# Patient Record
Sex: FEMALE | Race: BLACK OR AFRICAN AMERICAN | NOT HISPANIC OR LATINO | Employment: OTHER | URBAN - METROPOLITAN AREA
[De-identification: names, ages, dates, MRNs, and addresses within clinical notes are randomized per-mention and may not be internally consistent; named-entity substitution may affect disease eponyms.]

---

## 2013-07-23 LAB — CRC RECOMMENDATION EXT: NORMAL

## 2017-02-03 PROBLEM — R06.02 SHORTNESS OF BREATH: Status: ACTIVE | Noted: 2017-02-03

## 2017-02-04 PROBLEM — R05.9 COUGH: Status: ACTIVE | Noted: 2017-02-04

## 2017-02-21 ENCOUNTER — TELEPHONE (OUTPATIENT)
Dept: ELECTROPHYSIOLOGY | Facility: CLINIC | Age: 52
End: 2017-02-21

## 2017-02-21 NOTE — TELEPHONE ENCOUNTER
Patient called c/o chest pain, coughing and sob.Nurse recommended that patient seek treatment at nearest ER. Patient acknowledged.

## 2017-03-06 ENCOUNTER — INITIAL CONSULT (OUTPATIENT)
Dept: SURGERY | Facility: CLINIC | Age: 52
End: 2017-03-06
Payer: MEDICAID

## 2017-03-06 VITALS
HEIGHT: 66 IN | WEIGHT: 223.31 LBS | RESPIRATION RATE: 19 BRPM | BODY MASS INDEX: 35.89 KG/M2 | HEART RATE: 87 BPM | DIASTOLIC BLOOD PRESSURE: 90 MMHG | SYSTOLIC BLOOD PRESSURE: 130 MMHG | OXYGEN SATURATION: 98 %

## 2017-03-06 DIAGNOSIS — K64.5 INTERNAL THROMBOSED HEMORRHOIDS: ICD-10-CM

## 2017-03-06 DIAGNOSIS — K64.5 THROMBOSED HEMORRHOIDS: ICD-10-CM

## 2017-03-06 DIAGNOSIS — R06.02 SHORTNESS OF BREATH: Primary | ICD-10-CM

## 2017-03-06 PROCEDURE — 99214 OFFICE O/P EST MOD 30 MIN: CPT | Mod: S$GLB,,, | Performed by: EMERGENCY MEDICINE

## 2017-03-06 RX ORDER — HYDROCODONE BITARTRATE AND ACETAMINOPHEN 10; 325 MG/1; MG/1
1 TABLET ORAL
Refills: 0 | Status: ON HOLD | COMMUNITY
Start: 2017-02-06 | End: 2017-03-07

## 2017-03-06 RX ORDER — CETIRIZINE HYDROCHLORIDE 10 MG/1
1 TABLET ORAL DAILY
Refills: 6 | Status: ON HOLD | COMMUNITY
Start: 2017-01-15 | End: 2018-02-06 | Stop reason: HOSPADM

## 2017-03-06 RX ORDER — SYRING-NEEDL,DISP,INSUL,0.3 ML 29 G X1/2"
296 SYRINGE, EMPTY DISPOSABLE MISCELLANEOUS ONCE
Qty: 296 ML | Refills: 0 | Status: SHIPPED | OUTPATIENT
Start: 2017-03-06 | End: 2017-03-06

## 2017-03-06 RX ORDER — HYDROCODONE BITARTRATE AND ACETAMINOPHEN 10; 325 MG/1; MG/1
1 TABLET ORAL 4 TIMES DAILY PRN
Qty: 32 TABLET | Refills: 0 | Status: SHIPPED | OUTPATIENT
Start: 2017-03-06 | End: 2017-03-14

## 2017-03-06 RX ORDER — GABAPENTIN 100 MG/1
1 CAPSULE ORAL NIGHTLY
Refills: 2 | Status: ON HOLD | COMMUNITY
Start: 2017-01-03 | End: 2018-05-08

## 2017-03-06 RX ORDER — PROMETHAZINE HYDROCHLORIDE 6.25 MG/5ML
5 SYRUP ORAL
Refills: 0 | COMMUNITY
Start: 2017-03-01 | End: 2017-12-13 | Stop reason: DRUGHIGH

## 2017-03-06 RX ORDER — POTASSIUM CHLORIDE 750 MG/1
1 TABLET, EXTENDED RELEASE ORAL DAILY
Refills: 10 | Status: ON HOLD | COMMUNITY
Start: 2016-12-23 | End: 2018-08-21 | Stop reason: SDUPTHER

## 2017-03-06 RX ORDER — BUDESONIDE 90 UG/1
1 AEROSOL, POWDER RESPIRATORY (INHALATION) 2 TIMES DAILY
Refills: 6 | Status: ON HOLD | COMMUNITY
Start: 2017-02-17 | End: 2017-09-20

## 2017-03-06 RX ORDER — ALBUTEROL SULFATE 90 UG/1
2 AEROSOL, METERED RESPIRATORY (INHALATION) EVERY 6 HOURS PRN
Refills: 11 | COMMUNITY
Start: 2017-03-01 | End: 2023-05-23 | Stop reason: SDUPTHER

## 2017-03-06 NOTE — LETTER
March 9, 2017      Augustin Lion MD  [address]  [place] [address]           [facility]  [address]  [address]  [facility] [address]  Phone: [phone]  Fax: [phone]          Patient: Hafsa Hawley   MR Number: [id number]   YOB: 1965   Date of Visit: 3/6/2017       Dear Dr. Augustin Lion:    Thank you for referring Hafsa Hawley to me for evaluation. Attached you will find relevant portions of my assessment and plan of care.    If you have questions, please do not hesitate to call me. I look forward to following Hafsa Hawley along with you.    Sincerely,    GEORGE Rothman MD    Enclosure  CC:  No Recipients    If you would like to receive this communication electronically, please contact [email or web address] or [phone] to request more information on [employer] access.    For providers and/or their staff who would like to refer a patient to [facility], please contact us through our one-stop-shop provider referral line, [facility], at [phone].    If you feel you have received this communication in error or would no longer like to receive these types of communications, please e-mail [email or web address]

## 2017-03-06 NOTE — MR AVS SNAPSHOT
[facility]  [address]  [address] [address]  Phone: [phone]  Fax: [phone]                  Hafsa Hawley   3/6/2017 1:00 PM   Initial consult    Description:  Female : 1965   Provider:  GEORGE Rothman MD   Department:  [facility]           Reason for Visit     Hemorrhoids           Diagnoses this Visit        Comments    Shortness of breath    -  Primary     Thrombosed hemorrhoids         Internal thrombosed hemorrhoids                To Do List           Future Appointments        Provider Department Dept Phone    3/16/2017 1:40 PM PACEMAKER, [facility] y - Arrhythmia [phone]    3/20/2017 3:00 PM GEORGE Rothman MD [facility] [phone]      Goals (5 Years of Data)     None       These Medications        Disp Refills Start End    hydrocodone-acetaminophen 10-325mg (NORCO)  mg Tab 32 tablet 0 3/6/2017 3/14/2017    Take 1 tablet by mouth 4 (four) times daily as needed for Pain. - Oral    Pharmacy: [facility] [address] [place] [address] [id number] AT [facility] [address] Ph #: [phone]       magnesium citrate solution 296 mL 0 3/6/2017 3/6/2017    Take 296 mLs by mouth once. Drink 1/2 bottle evening prior to surgery - Oral    Pharmacy: [facility] [address] [place] [address] [id number] AT [facility] [address] Ph #: [phone]         [facility]     [facility] On Call Nurse Care Line -  Assistance  Registered nurses in the [facility] Center provide clinical advisement, health education, appointment booking, and other advisory services.  Call for this free service at [phone].             Medications           Message regarding Medications     Verify the changes and/or additions to your medication regime listed below are the same as discussed with your clinician today.  If any of these changes or additions are incorrect, please  notify your healthcare provider.        START taking these NEW medications        Refills    hydrocodone-acetaminophen 10-325mg (NORCO)  mg Tab 0    Sig: Take 1 tablet by mouth 4 (four) times daily as needed for Pain.    Class: Print    Route: Oral    magnesium citrate solution 0    Sig: Take 296 mLs by mouth once. Drink 1/2 bottle evening prior to surgery    Class: Print    Route: Oral      STOP taking these medications     hydrocodone-acetaminophen 5-325mg (NORCO) 5-325 mg per tablet Take 1 tablet by mouth every 4 to 6 hours as needed.    potassium chloride SA (K-DUR,KLOR-CON) 20 MEQ tablet Take 1 tablet (20 mEq total) by mouth once daily.    SYMBICORT 80-4.5 mcg/actuation HFAA Inhale 1 puff into the lungs 2 (two) times daily.           Verify that the below list of medications is an accurate representation of the medications you are currently taking.  If none reported, the list may be blank. If incorrect, please contact your healthcare provider. Carry this list with you in case of emergency.           Current Medications     albuterol (PROVENTIL) 2.5 mg /3 mL (0.083 %) nebulizer solution Take 2 mLs by nebulization 2 (two) times daily as needed.    alprazolam (XANAX) 2 MG Tab Take 1 tablet by mouth as needed.    apixaban (ELIQUIS) 5 mg Tab Take 1 tablet (5 mg total) by mouth 2 (two) times daily.    b complex vitamins tablet Take 1 tablet by mouth once daily.    carvedilol (COREG) 25 MG tablet Take 1 tablet (25 mg total) by mouth 2 (two) times daily.    cetirizine (ZYRTEC) 10 MG tablet Take 1 tablet by mouth once daily.    cyanocobalamin, vitamin B-12, 50 mcg tablet Take 5 mcg by mouth once daily.    digoxin (LANOXIN) 125 mcg tablet Take 1 tablet (0.125 mg total) by mouth once daily.    furosemide (LASIX) 80 MG tablet Take 1 tablet (80 mg total) by mouth once daily.    gabapentin (NEURONTIN) 100 MG capsule Take 1 capsule by mouth every evening.    guaifenesin 100 mg/5 ml (ROBITUSSIN) 100 mg/5 mL syrup Take  "200 mg by mouth 3 (three) times daily as needed for Cough.    hydrALAZINE (APRESOLINE) 25 MG tablet Take 1 tablet (25 mg total) by mouth 2 (two) times daily.    hydrocodone-acetaminophen 10-325mg (NORCO)  mg Tab Take 1 tablet by mouth 4 (four) times daily as needed for Pain.    ipratropium-albuterol (COMBIVENT RESPIMAT)  mcg/actuation inhaler Inhale 1 puff into the lungs 2 (two) times daily. Rescue    isosorbide dinitrate (ISORDIL) 20 MG tablet Take 1 tablet (20 mg total) by mouth 2 (two) times daily.    lidocaine HCL 2% (XYLOCAINE) 2 % jelly Apply topically as needed.    NITROSTAT 0.4 mg SL tablet Take 1 mg by mouth as needed.    pantoprazole (PROTONIX) 40 MG tablet Take 1 tablet by mouth once daily.    potassium chloride (KLOR-CON) 10 MEQ TbSR Take 1 tablet by mouth once daily.    promethazine (PHENERGAN) 6.25 mg/5 mL syrup Take 5 mLs by mouth as needed.    PULMICORT FLEXHALER 90 mcg/actuation AePB Inhale 1 puff into the lungs 2 (two) times daily.    SPIRIVA WITH HANDIHALER 18 mcg inhalation capsule USE ONE C PO QD    spironolactone (ALDACTONE) 25 MG tablet Take 25 mg by mouth once daily.    VENTOLIN HFA 90 mcg/actuation inhaler Inhale 2 puffs into the lungs 2 (two) times daily.           Clinical Reference Information           Your Vitals Were     BP Pulse Resp Height Weight Last Period    130/90 (BP Location: Left arm, Patient Position: Standing, BP Method: Manual) 87 19 5' 6" (1.676 m) 101.3 kg (223 lb 4.8 oz) 10/23/2001    SpO2 BMI             98% 36.04 kg/m2         Blood Pressure          Most Recent Value    BP  (!)  130/90      Allergies as of 3/6/2017     Penicillins    Percocet [Oxycodone-acetaminophen]    Iodinated Contrast Media - Iv Dye    Diovan Hct [Valsartan-hydrochlorothiazide]    Tramadol      Immunizations Administered on Date of Encounter - 3/6/2017     None      Orders Placed During Today's Visit      Normal Orders This Visit    Case Request Operating Room: PROCEDURE PROLAPSE " HEMORRHOID (PPH)     EKG 12-lead     Future Labs/Procedures Expected by Expires    B-TYPE NATRIURETIC PEPTIDE  3/6/2017 5/5/2018    CK-MB  3/6/2017 5/5/2018    CK  3/6/2017 5/5/2018    TROPONIN I  3/6/2017 5/5/2018      [facility] Sign-Up     Activating your [facility] account is as easy as 1-2-3!     1) Visit [email or web address], select Sign Up Now, enter this activation code and your date of birth, then select Next.  [email or web address]  Expires: 3/21/2017  2:52 PM      2) Create a username and password to use when you visit [facility] in the future and select a security question in case you lose your password and select Next.    3) Enter your e-mail address and click Sign Up!    Additional Information  If you have questions, please e-mail [email or web address] or call [phone] to talk to our [facility] staff. Remember, [facility] is NOT to be used for urgent needs. For medical emergencies, dial 911.         Instructions    1. NOTHING TO EAT OR DRINK AFTER 1 AM THE DAY OF YOUR PROCEDURE    2. YOU MAY TAKE YOUR USUAL HEART AND BLOOD PRESSURE MEDICATIONS WITH A FEW SMALL SIPS OF     WATER THE MORNING OF YOUR PROCEDURE    3. IF YOU ARE DIABETIC, DO NOT TAKE ANY DIABETIC MEDICATIONS THE MORNING OF YOUR PROCEDURE    4. SHOWER OR BATHE THE MORNING OF YOUR PROCEDURE, IF AT ALL POSSIBLE    5. YOU WILL NEED SOMEONE TO ACCOMPANY YOU TO DRIVE YOU HOME AFTER YOUR PROCEDURE    6. CALL OUR OFFICE IF YOU HAVE ANY QUESTIONS............[phone]  TAKE 1/2 BOTTLE OF MAGNESIUM CITRATE THIS EVENING       Language Assistance Services     ATTENTION: Language assistance services are available, free of charge. Please call [phone].      ATENCIÓN: Si habla español, tiene a freeman disposición servicios gratuitos de asistencia lingüística. Llame al [phone].     CHÚ Ý: N?u b?n nói Ti?ng Vi?t, có các d?ch v? h? tr? ngôn ng? mi?n phí dành cho b?n. G?i s? [phone].         [facility] complies with applicable Federal  civil rights laws and does not discriminate on the basis of race, color, national origin, age, disability, or sex.

## 2017-03-06 NOTE — PROGRESS NOTES
History & Physical    SUBJECTIVE:     History of Present Illness:  Patient is a 51 y.o. female presents with thrombosed hemorrhoids and dyspnea. Referred by her PCP, Dr Faye    Chief Complaint   Patient presents with    Hemorrhoids       Review of patient's allergies indicates:   Allergen Reactions    Penicillins Hives    Percocet [oxycodone-acetaminophen] Hives    Iodinated contrast media - iv dye Nausea And Vomiting    Diovan hct [valsartan-hydrochlorothiazide] Other (See Comments)     Causes coughing    Tramadol Nausea And Vomiting       Current Outpatient Prescriptions   Medication Sig Dispense Refill    albuterol (PROVENTIL) 2.5 mg /3 mL (0.083 %) nebulizer solution Take 2 mLs by nebulization 2 (two) times daily as needed.  3    alprazolam (XANAX) 2 MG Tab Take 1 tablet by mouth as needed.  1    apixaban (ELIQUIS) 5 mg Tab Take 1 tablet (5 mg total) by mouth 2 (two) times daily. 60 tablet 11    b complex vitamins tablet Take 1 tablet by mouth once daily.      carvedilol (COREG) 25 MG tablet Take 1 tablet (25 mg total) by mouth 2 (two) times daily. 60 tablet 11    cetirizine (ZYRTEC) 10 MG tablet Take 1 tablet by mouth once daily.  6    cyanocobalamin, vitamin B-12, 50 mcg tablet Take 5 mcg by mouth once daily.      furosemide (LASIX) 80 MG tablet Take 1 tablet (80 mg total) by mouth once daily. 30 tablet 11    gabapentin (NEURONTIN) 100 MG capsule Take 1 capsule by mouth every evening.  2    hydrALAZINE (APRESOLINE) 25 MG tablet Take 1 tablet (25 mg total) by mouth 2 (two) times daily. 60 tablet 11    hydrocodone-acetaminophen 10-325mg (NORCO)  mg Tab Take 1 tablet by mouth as needed.  0    ipratropium-albuterol (COMBIVENT RESPIMAT)  mcg/actuation inhaler Inhale 1 puff into the lungs 2 (two) times daily. Rescue      isosorbide dinitrate (ISORDIL) 20 MG tablet Take 1 tablet (20 mg total) by mouth 2 (two) times daily. 60 tablet 11    NITROSTAT 0.4 mg SL tablet Take 1 mg by mouth as  needed.  4    pantoprazole (PROTONIX) 40 MG tablet Take 1 tablet by mouth once daily.  5    potassium chloride (KLOR-CON) 10 MEQ TbSR Take 1 tablet by mouth once daily.  10    promethazine (PHENERGAN) 6.25 mg/5 mL syrup Take 5 mLs by mouth as needed.  0    PULMICORT FLEXHALER 90 mcg/actuation AePB Inhale 1 puff into the lungs 2 (two) times daily.  6    SPIRIVA WITH HANDIHALER 18 mcg inhalation capsule USE ONE C PO QD  6    VENTOLIN HFA 90 mcg/actuation inhaler Inhale 2 puffs into the lungs 2 (two) times daily.  11    digoxin (LANOXIN) 125 mcg tablet Take 1 tablet (0.125 mg total) by mouth once daily. 30 tablet 11    hydrocodone-acetaminophen 10-325mg (NORCO)  mg Tab Take 1 tablet by mouth 4 (four) times daily as needed for Pain. 32 tablet 0    magnesium citrate solution Take 296 mLs by mouth once. Drink 1/2 bottle evening prior to surgery 296 mL 0     No current facility-administered medications for this visit.        Past Medical History:   Diagnosis Date    Anemia     Anticoagulant long-term use     Arthritis     Atrial fibrillation     Congestive heart failure     COPD (chronic obstructive pulmonary disease)     Encounter for blood transfusion     GERD (gastroesophageal reflux disease)     Hypertension     Thyroid disease      Past Surgical History:   Procedure Laterality Date    APPENDECTOMY      CARDIAC DEFIBRILLATOR PLACEMENT Left 12/2016    CARDIAC DEFIBRILLATOR PLACEMENT  2016    EYE SURGERY      FRACTURE SURGERY      HYSTERECTOMY      left knee mass removal Left      History reviewed. No pertinent family history.  Social History   Substance Use Topics    Smoking status: Never Smoker    Smokeless tobacco: Never Used    Alcohol use Yes      Comment: occaisionally        Review of Systems:  Review of Systems   Constitutional: Negative for appetite change, chills, diaphoresis, fatigue, fever and unexpected weight change.   HENT: Negative for congestion, dental problem, ear  "pain, facial swelling, mouth sores, nosebleeds, sinus pressure, sneezing, sore throat, trouble swallowing and voice change.    Eyes: Negative for photophobia, redness and visual disturbance.   Respiratory: Positive for chest tightness and shortness of breath. Negative for cough, choking, wheezing and stridor.    Cardiovascular: Negative for chest pain, palpitations and leg swelling.   Gastrointestinal: Negative for abdominal distention, abdominal pain, anal bleeding, blood in stool, constipation, diarrhea, nausea, rectal pain and vomiting.   Endocrine: Negative for cold intolerance and heat intolerance.   Genitourinary: Negative for difficulty urinating, dysuria, flank pain, hematuria, menstrual problem, pelvic pain, vaginal bleeding, vaginal discharge and vaginal pain.        Very painful thrombosed hemorrhoids   Musculoskeletal: Negative for arthralgias, back pain, joint swelling, myalgias, neck pain and neck stiffness.   Skin: Negative for rash and wound.   Allergic/Immunologic: Negative for environmental allergies, food allergies and immunocompromised state.   Neurological: Negative for dizziness, seizures, syncope, facial asymmetry, speech difficulty, weakness, light-headedness, numbness and headaches.   Hematological: Negative for adenopathy. Does not bruise/bleed easily.   Psychiatric/Behavioral: Negative for confusion.       OBJECTIVE:     Vital Signs (Most Recent)  Pulse: 87 (03/06/17 1622)  Resp: 19 (03/06/17 1622)  BP: (!) 130/90 (03/06/17 1622)  SpO2: 98 % (03/06/17 1622)  5' 6" (1.676 m)  101.3 kg (223 lb 4.8 oz)     Physical Exam:  Physical Exam   Constitutional: She appears well-developed and well-nourished. No distress.   HENT:   Head: Normocephalic and atraumatic.   Right Ear: External ear normal.   Left Ear: External ear normal.   Nose: Nose normal.   Mouth/Throat: Oropharynx is clear and moist. No oropharyngeal exudate.   Eyes: Conjunctivae and EOM are normal. Pupils are equal, round, and reactive " to light. No scleral icterus.   Neck: Normal range of motion. Neck supple. No JVD present. No tracheal deviation present. No thyromegaly present.   Cardiovascular: Normal rate, regular rhythm and intact distal pulses.  Exam reveals no gallop and no friction rub.    No murmur heard.  Pulmonary/Chest: Effort normal and breath sounds normal. No stridor. No respiratory distress. She has no wheezes. She has no rales. She exhibits no tenderness.   Abdominal: Soft. Bowel sounds are normal. She exhibits no distension and no mass. There is no tenderness. There is no rebound and no guarding. No hernia.   Genitourinary:   Genitourinary Comments: Garde III hemorrhoids ( internal and external; Thrombosed hemorrhoids   Musculoskeletal: She exhibits no edema or tenderness.   Lymphadenopathy:     She has no cervical adenopathy.   Neurological: She is alert. She has normal reflexes.   Skin: Skin is warm and dry. No rash noted. She is not diaphoretic. No erythema. No pallor.   Psychiatric: She has a normal mood and affect.       Laboratory  reviewed    Diagnostic Results:  reviewed    ASSESSMENT/PLAN:     Thrombosed Internal & external hemorrhoids  Dyspnea    PLAN:Plan     Surgical excision in AM  Work up dyspnea pr-op tonight

## 2017-03-06 NOTE — PATIENT INSTRUCTIONS
1. NOTHING TO EAT OR DRINK AFTER 1 AM THE DAY OF YOUR PROCEDURE    2. YOU MAY TAKE YOUR USUAL HEART AND BLOOD PRESSURE MEDICATIONS WITH A FEW SMALL SIPS OF     WATER THE MORNING OF YOUR PROCEDURE    3. IF YOU ARE DIABETIC, DO NOT TAKE ANY DIABETIC MEDICATIONS THE MORNING OF YOUR PROCEDURE    4. SHOWER OR BATHE THE MORNING OF YOUR PROCEDURE, IF AT ALL POSSIBLE    5. YOU WILL NEED SOMEONE TO ACCOMPANY YOU TO DRIVE YOU HOME AFTER YOUR PROCEDURE    6. CALL OUR OFFICE IF YOU HAVE ANY QUESTIONS............[phone]  TAKE 1/2 BOTTLE OF MAGNESIUM CITRATE THIS EVENING

## 2017-03-07 PROBLEM — K64.5 INTERNAL THROMBOSED HEMORRHOIDS: Status: ACTIVE | Noted: 2017-03-07

## 2017-03-16 ENCOUNTER — CLINICAL SUPPORT (OUTPATIENT)
Dept: ELECTROPHYSIOLOGY | Facility: CLINIC | Age: 52
End: 2017-03-16
Payer: MEDICAID

## 2017-03-16 DIAGNOSIS — Z95.810 AUTOMATIC IMPLANTABLE CARDIAC DEFIBRILLATOR IN SITU: ICD-10-CM

## 2017-03-16 PROCEDURE — 93282 PRGRMG EVAL IMPLANTABLE DFB: CPT | Mod: PBBFAC | Performed by: INTERNAL MEDICINE

## 2017-03-17 RX ORDER — SPIRONOLACTONE 50 MG/1
1 TABLET, FILM COATED ORAL DAILY
Refills: 6 | COMMUNITY
Start: 2017-03-01 | End: 2017-03-17

## 2017-03-17 RX ORDER — IPRATROPIUM BROMIDE AND ALBUTEROL SULFATE 2.5; .5 MG/3ML; MG/3ML
1 SOLUTION RESPIRATORY (INHALATION) 2 TIMES DAILY
Refills: 11 | Status: ON HOLD | COMMUNITY
Start: 2017-03-01 | End: 2018-05-10

## 2017-03-20 ENCOUNTER — OFFICE VISIT (OUTPATIENT)
Dept: SURGERY | Facility: CLINIC | Age: 52
End: 2017-03-20
Payer: MEDICAID

## 2017-03-20 VITALS
WEIGHT: 218.38 LBS | RESPIRATION RATE: 18 BRPM | OXYGEN SATURATION: 96 % | DIASTOLIC BLOOD PRESSURE: 90 MMHG | BODY MASS INDEX: 35.1 KG/M2 | HEIGHT: 66 IN | HEART RATE: 96 BPM | SYSTOLIC BLOOD PRESSURE: 140 MMHG

## 2017-03-20 DIAGNOSIS — Z98.890 POST-OPERATIVE STATE: Primary | ICD-10-CM

## 2017-03-20 PROCEDURE — 99024 POSTOP FOLLOW-UP VISIT: CPT | Mod: S$GLB,,, | Performed by: EMERGENCY MEDICINE

## 2017-03-20 NOTE — MR AVS SNAPSHOT
[facility]  [address]  [address] [address]  Phone: [phone]  Fax: [phone]                  Hafsa Hawley   3/20/2017 3:00 PM   Office Visit    Description:  Female : 1965   Provider:  GEORGE Rothman MD   Department:  [facility]           Reason for Visit     Post-op Evaluation           Diagnoses this Visit        Comments    Post-operative state    -  Primary            To Do List           Goals (5 Years of Data)     None      [facility]     [facility] Nurse Care Line -  Assistance  Registered nurses in the [facility] provide clinical advisement, health education, appointment booking, and other advisory services.  Call for this free service at [phone].             Medications           Message regarding Medications     Verify the changes and/or additions to your medication regime listed below are the same as discussed with your clinician today.  If any of these changes or additions are incorrect, please notify your healthcare provider.             Verify that the below list of medications is an accurate representation of the medications you are currently taking.  If none reported, the list may be blank. If incorrect, please contact your healthcare provider. Carry this list with you in case of emergency.           Current Medications     albuterol-ipratropium 2.5mg-0.5mg/3mL (DUO-NEB) 0.5 mg-3 mg(2.5 mg base)/3 mL nebulizer solution Take 1 mL by nebulization 2 (two) times daily.    alprazolam (XANAX) 2 MG Tab Take 1 tablet by mouth as needed.    apixaban (ELIQUIS) 5 mg Tab Take 1 tablet (5 mg total) by mouth 2 (two) times daily.    b complex vitamins tablet Take 1 tablet by mouth once daily.    cetirizine (ZYRTEC) 10 MG tablet Take 1 tablet by mouth once daily.    cyanocobalamin, vitamin B-12, 50 mcg tablet Take 5 mcg by mouth once daily.    digoxin (LANOXIN) 125 mcg tablet Take 1 tablet (0.125  "mg total) by mouth once daily.    furosemide (LASIX) 80 MG tablet Take 1 tablet (80 mg total) by mouth once daily.    gabapentin (NEURONTIN) 100 MG capsule Take 1 capsule by mouth every evening.    hydrALAZINE (APRESOLINE) 25 MG tablet Take 1 tablet (25 mg total) by mouth 2 (two) times daily.    ipratropium-albuterol (COMBIVENT RESPIMAT)  mcg/actuation inhaler Inhale 1 puff into the lungs 2 (two) times daily. Rescue    isosorbide dinitrate (ISORDIL) 20 MG tablet Take 1 tablet (20 mg total) by mouth 2 (two) times daily.    lidocaine HCL 2% (XYLOCAINE) 2 % jelly Apply topically as needed.    NITROSTAT 0.4 mg SL tablet Take 1 mg by mouth as needed.    pantoprazole (PROTONIX) 40 MG tablet Take 1 tablet by mouth once daily.    potassium chloride (KLOR-CON) 10 MEQ TbSR Take 1 tablet by mouth once daily.    promethazine (PHENERGAN) 6.25 mg/5 mL syrup Take 5 mLs by mouth as needed.    PULMICORT FLEXHALER 90 mcg/actuation AePB Inhale 1 puff into the lungs 2 (two) times daily.    SPIRIVA WITH HANDIHALER 18 mcg inhalation capsule USE ONE C PO QD    VENTOLIN HFA 90 mcg/actuation inhaler Inhale 2 puffs into the lungs 2 (two) times daily.    carvedilol (COREG) 25 MG tablet Take 1 tablet (25 mg total) by mouth 2 (two) times daily.    guaifenesin 100 mg/5 ml (ROBITUSSIN) 100 mg/5 mL syrup Take 200 mg by mouth 3 (three) times daily as needed for Cough.           Clinical Reference Information           Your Vitals Were     BP Pulse Resp Height Weight Last Period    140/90 (BP Location: Left arm, Patient Position: Sitting, BP Method: Manual) 96 18 5' 6" (1.676 m) 99.1 kg (218 lb 6.4 oz) 10/23/2001    SpO2 BMI             96% 35.25 kg/m2         Blood Pressure          Most Recent Value    BP  (!)  140/90      Allergies as of 3/20/2017     Penicillins    Iodinated Contrast Media - Iv Dye    Percocet [Oxycodone-acetaminophen]    Diovan Hct [Valsartan-hydrochlorothiazide]    Tramadol      Immunizations Administered on Date of " Encounter - 3/20/2017     None      [facility] Sign-Up     Activating your [facility] account is as easy as 1-2-3!     1) Visit [email or web address], select Sign Up Now, enter this activation code and your date of birth, then select Next.  [email or web address]  Expires: 3/21/2017  3:52 PM      2) Create a username and password to use when you visit [facility] in the future and select a security question in case you lose your password and select Next.    3) Enter your e-mail address and click Sign Up!    Additional Information  If you have questions, please e-mail [email or web address] or call [phone] to talk to our [facility] staff. Remember, [facility] is NOT to be used for urgent needs. For medical emergencies, dial 911.         Language Assistance Services     ATTENTION: Language assistance services are available, free of charge. Please call [phone].      ATENCIÓN: Si habla español, tiene a freeman disposición servicios gratuitos de asistencia lingüística. Llame al [phone].     SHELBI Ý: N?u b?n nói Ti?ng Vi?t, có các d?ch v? h? tr? ngôn ng? mi?n phí dành cho b?n. G?i s? [phone].         [facility] complies with applicable Federal civil rights laws and does not discriminate on the basis of race, color, national origin, age, disability, or sex.

## 2017-03-20 NOTE — PROGRESS NOTES
Healing slowly but well from hemorrhoid surgery    Doesn't feel well today. States she has theses type days from time to time  Encouraged to follow up with her cardiologist sooner rather than later.    RTC as needed  Continue post op care program

## 2017-04-19 PROBLEM — E27.8 ADRENAL MASS, RIGHT: Status: ACTIVE | Noted: 2017-04-19

## 2017-06-07 PROBLEM — M51.369 LUMBAR DEGENERATIVE DISC DISEASE: Status: ACTIVE | Noted: 2017-06-07

## 2017-06-07 PROBLEM — M51.36 LUMBAR DEGENERATIVE DISC DISEASE: Status: ACTIVE | Noted: 2017-06-07

## 2017-06-09 ENCOUNTER — TELEPHONE (OUTPATIENT)
Dept: SURGERY | Facility: CLINIC | Age: 52
End: 2017-06-09

## 2017-06-09 DIAGNOSIS — E27.8 ADRENAL MASS: Primary | ICD-10-CM

## 2017-06-09 NOTE — TELEPHONE ENCOUNTER
----- Message from Burke Ngueyn Jr., MD sent at 6/9/2017  7:20 AM CDT -----  These really dont look that abnormal.  Im not too worried about it but may be worth it to get a formal assessment.  We will get her seen by Endocrine here.  Prob the best thing for her.  Sound good?

## 2017-06-09 NOTE — TELEPHONE ENCOUNTER
adrenal mass   Received: Today   Message Contents   Emelia Galicia RN  P [facility] Staff; P [facility]   Cc: Sophia Gonsalez RN; Burke Nguyen Jr., MD; MD Meghan Toney,     Can Dr. Deng's staff or [facility] staff scheduled this pt to be seen for adrenal mass please. She lives closer to where Dr. Mendoza's office is located.       Please let me know if you have any questions or need anything.     Thanks!   Emelia Francisco [id number]

## 2017-06-20 ENCOUNTER — TELEPHONE (OUTPATIENT)
Dept: ELECTROPHYSIOLOGY | Facility: CLINIC | Age: 52
End: 2017-06-20

## 2017-06-20 ENCOUNTER — CLINICAL SUPPORT (OUTPATIENT)
Dept: ELECTROPHYSIOLOGY | Facility: CLINIC | Age: 52
End: 2017-06-20
Payer: MEDICAID

## 2017-06-20 DIAGNOSIS — I42.8 CARDIOMYOPATHY, NONISCHEMIC: ICD-10-CM

## 2017-06-20 PROCEDURE — 93295 DEV INTERROG REMOTE 1/2/MLT: CPT | Mod: ,,, | Performed by: INTERNAL MEDICINE

## 2017-06-20 PROCEDURE — 93296 REM INTERROG EVL PM/IDS: CPT | Mod: PBBFAC | Performed by: INTERNAL MEDICINE

## 2017-06-20 NOTE — TELEPHONE ENCOUNTER
----- Message from Liset Weaver sent at 6/20/2017 11:44 AM CDT -----  Contact: pt called  Pt called, states she will like to confirm that you have received transmission.Ph [phone]. Thank you    I spoke with patient and informed her that I did receive her transmission. Patient stated understanding.

## 2017-09-07 RX ORDER — DIGOXIN 125 UG/1
TABLET ORAL
Qty: 30 TABLET | Refills: 0 | OUTPATIENT
Start: 2017-09-07

## 2017-09-19 PROBLEM — I21.4 NSTEMI (NON-ST ELEVATED MYOCARDIAL INFARCTION): Status: ACTIVE | Noted: 2017-09-19

## 2017-09-20 PROBLEM — R06.02 SOB (SHORTNESS OF BREATH): Status: ACTIVE | Noted: 2017-09-20

## 2017-09-22 ENCOUNTER — TELEPHONE (OUTPATIENT)
Dept: ELECTROPHYSIOLOGY | Facility: CLINIC | Age: 52
End: 2017-09-22

## 2017-09-22 DIAGNOSIS — I42.8 NONISCHEMIC CARDIOMYOPATHY: ICD-10-CM

## 2017-09-22 DIAGNOSIS — Z95.810 ICD (IMPLANTABLE CARDIOVERTER-DEFIBRILLATOR) IN PLACE: Primary | ICD-10-CM

## 2017-09-22 NOTE — TELEPHONE ENCOUNTER
Called pt and left voicemail informing pt we would reschedule in clinic ICD check to a remote. Left return number if pt had any questions

## 2017-10-03 ENCOUNTER — CLINICAL SUPPORT (OUTPATIENT)
Dept: ELECTROPHYSIOLOGY | Facility: CLINIC | Age: 52
End: 2017-10-03
Payer: MEDICAID

## 2017-10-03 DIAGNOSIS — Z95.810 ICD (IMPLANTABLE CARDIOVERTER-DEFIBRILLATOR) IN PLACE: ICD-10-CM

## 2017-10-03 DIAGNOSIS — I42.8 NONISCHEMIC CARDIOMYOPATHY: ICD-10-CM

## 2017-10-03 PROCEDURE — 93295 DEV INTERROG REMOTE 1/2/MLT: CPT | Mod: ,,, | Performed by: INTERNAL MEDICINE

## 2017-10-03 PROCEDURE — 93296 REM INTERROG EVL PM/IDS: CPT | Mod: PBBFAC | Performed by: INTERNAL MEDICINE

## 2017-10-06 ENCOUNTER — TELEPHONE (OUTPATIENT)
Dept: ELECTROPHYSIOLOGY | Facility: CLINIC | Age: 52
End: 2017-10-06

## 2017-10-06 NOTE — TELEPHONE ENCOUNTER
----- Message from Katarina Melo sent at 10/6/2017 12:36 PM CDT -----  Contact: patient  Please call pt at [phone]. Patient would like to reschedule Defib tune up appt missed on 10/03/17    Thank you

## 2017-11-10 RX ORDER — APIXABAN 5 MG/1
TABLET, FILM COATED ORAL
Qty: 60 TABLET | Refills: 0 | Status: ON HOLD | OUTPATIENT
Start: 2017-11-10 | End: 2019-05-23 | Stop reason: SDUPTHER

## 2017-11-10 RX ORDER — ISOSORBIDE DINITRATE 20 MG/1
TABLET ORAL
Qty: 60 TABLET | Refills: 0 | Status: SHIPPED | OUTPATIENT
Start: 2017-11-10 | End: 2018-08-13 | Stop reason: SINTOL

## 2017-11-10 RX ORDER — HYDRALAZINE HYDROCHLORIDE 25 MG/1
TABLET, FILM COATED ORAL
Qty: 60 TABLET | Refills: 0 | Status: SHIPPED | OUTPATIENT
Start: 2017-11-10 | End: 2018-01-10 | Stop reason: SDUPTHER

## 2017-11-10 RX ORDER — DIGOXIN 125 UG/1
TABLET ORAL
Qty: 30 TABLET | Refills: 0 | Status: ON HOLD | OUTPATIENT
Start: 2017-11-10 | End: 2018-02-06 | Stop reason: HOSPADM

## 2017-12-19 ENCOUNTER — HOSPITAL ENCOUNTER (INPATIENT)
Facility: HOSPITAL | Age: 52
LOS: 1 days | Discharge: HOME | DRG: 292 | End: 2017-12-22
Attending: EMERGENCY MEDICINE | Admitting: FAMILY MEDICINE
Payer: MEDICAID

## 2017-12-19 DIAGNOSIS — J44.9 CHRONIC OBSTRUCTIVE PULMONARY DISEASE, UNSPECIFIED COPD TYPE: ICD-10-CM

## 2017-12-19 DIAGNOSIS — R53.81 DEBILITY: ICD-10-CM

## 2017-12-19 DIAGNOSIS — R17 TOTAL BILIRUBIN, ELEVATED: ICD-10-CM

## 2017-12-19 DIAGNOSIS — I50.9 ACUTE HEART FAILURE: ICD-10-CM

## 2017-12-19 DIAGNOSIS — R06.02 SHORTNESS OF BREATH: ICD-10-CM

## 2017-12-19 DIAGNOSIS — R06.02 SOB (SHORTNESS OF BREATH): ICD-10-CM

## 2017-12-19 DIAGNOSIS — I50.9 CONGESTIVE HEART FAILURE, UNSPECIFIED CONGESTIVE HEART FAILURE CHRONICITY, UNSPECIFIED CONGESTIVE HEART FAILURE TYPE: ICD-10-CM

## 2017-12-19 DIAGNOSIS — R07.9 CHEST PAIN: ICD-10-CM

## 2017-12-19 DIAGNOSIS — D64.9 ANEMIA, UNSPECIFIED TYPE: Primary | ICD-10-CM

## 2017-12-19 DIAGNOSIS — R05.9 COUGH: ICD-10-CM

## 2017-12-19 PROBLEM — R10.9 ABDOMINAL PAIN: Status: ACTIVE | Noted: 2017-12-19

## 2017-12-19 LAB
ALBUMIN SERPL BCP-MCNC: 2.9 G/DL
ALBUMIN SERPL BCP-MCNC: 2.9 G/DL
ALP SERPL-CCNC: 56 U/L
ALP SERPL-CCNC: 57 U/L
ALT SERPL W/O P-5'-P-CCNC: 34 U/L
ALT SERPL W/O P-5'-P-CCNC: 34 U/L
ANION GAP SERPL CALC-SCNC: 10 MMOL/L
ANION GAP SERPL CALC-SCNC: 12 MMOL/L
ANISOCYTOSIS BLD QL SMEAR: ABNORMAL
AST SERPL-CCNC: 27 U/L
AST SERPL-CCNC: 30 U/L
BACTERIA #/AREA URNS HPF: ABNORMAL /HPF
BASOPHILS # BLD AUTO: ABNORMAL K/UL
BASOPHILS NFR BLD: 0 %
BILIRUB SERPL-MCNC: 3 MG/DL
BILIRUB SERPL-MCNC: 3 MG/DL
BILIRUB UR QL STRIP: ABNORMAL
BNP SERPL-MCNC: 2547 PG/ML
BUN SERPL-MCNC: 24 MG/DL
BUN SERPL-MCNC: 25 MG/DL
CALCIUM SERPL-MCNC: 8.7 MG/DL
CALCIUM SERPL-MCNC: 8.7 MG/DL
CHLORIDE SERPL-SCNC: 110 MMOL/L
CHLORIDE SERPL-SCNC: 111 MMOL/L
CK SERPL-CCNC: 41 U/L
CK SERPL-CCNC: 46 U/L
CLARITY UR: CLEAR
CO2 SERPL-SCNC: 19 MMOL/L
CO2 SERPL-SCNC: 23 MMOL/L
COLOR UR: YELLOW
CREAT SERPL-MCNC: 1.1 MG/DL
CREAT SERPL-MCNC: 1.1 MG/DL
DACRYOCYTES BLD QL SMEAR: ABNORMAL
DIFFERENTIAL METHOD: ABNORMAL
EOSINOPHIL # BLD AUTO: ABNORMAL K/UL
EOSINOPHIL NFR BLD: 0 %
ERYTHROCYTE [DISTWIDTH] IN BLOOD BY AUTOMATED COUNT: 27.9 %
EST. GFR  (AFRICAN AMERICAN): >60 ML/MIN/1.73 M^2
EST. GFR  (AFRICAN AMERICAN): >60 ML/MIN/1.73 M^2
EST. GFR  (NON AFRICAN AMERICAN): 58 ML/MIN/1.73 M^2
EST. GFR  (NON AFRICAN AMERICAN): 58 ML/MIN/1.73 M^2
FLUAV AG SPEC QL IA: NEGATIVE
FLUBV AG SPEC QL IA: NEGATIVE
GIANT PLATELETS BLD QL SMEAR: PRESENT
GLUCOSE SERPL-MCNC: 101 MG/DL
GLUCOSE SERPL-MCNC: 97 MG/DL
GLUCOSE UR QL STRIP: NEGATIVE
HCT VFR BLD AUTO: 33.4 %
HGB BLD-MCNC: 10.1 G/DL
HGB UR QL STRIP: ABNORMAL
HYALINE CASTS #/AREA URNS LPF: 0 /LPF
KETONES UR QL STRIP: NEGATIVE
LEUKOCYTE ESTERASE UR QL STRIP: NEGATIVE
LYMPHOCYTES # BLD AUTO: ABNORMAL K/UL
LYMPHOCYTES NFR BLD: 26 %
MAGNESIUM SERPL-MCNC: 1.7 MG/DL
MAGNESIUM SERPL-MCNC: 1.8 MG/DL
MCH RBC QN AUTO: 17.2 PG
MCHC RBC AUTO-ENTMCNC: 30.2 G/DL
MCV RBC AUTO: 57 FL
MICROSCOPIC COMMENT: ABNORMAL
MONOCYTES # BLD AUTO: ABNORMAL K/UL
MONOCYTES NFR BLD: 7 %
NEUTROPHILS NFR BLD: 67 %
NITRITE UR QL STRIP: NEGATIVE
NRBC BLD-RTO: ABNORMAL /100 WBC
OVALOCYTES BLD QL SMEAR: ABNORMAL
PH UR STRIP: 6 [PH] (ref 5–8)
PLATELET # BLD AUTO: 238 K/UL
PMV BLD AUTO: ABNORMAL FL
POIKILOCYTOSIS BLD QL SMEAR: ABNORMAL
POLYCHROMASIA BLD QL SMEAR: ABNORMAL
POTASSIUM SERPL-SCNC: 3.9 MMOL/L
POTASSIUM SERPL-SCNC: 4.3 MMOL/L
PROT SERPL-MCNC: 6.1 G/DL
PROT SERPL-MCNC: 6.3 G/DL
PROT UR QL STRIP: ABNORMAL
RBC # BLD AUTO: 5.87 M/UL
RBC #/AREA URNS HPF: 0 /HPF (ref 0–4)
SCHISTOCYTES BLD QL SMEAR: PRESENT
SODIUM SERPL-SCNC: 142 MMOL/L
SODIUM SERPL-SCNC: 143 MMOL/L
SP GR UR STRIP: 1.02 (ref 1–1.03)
SPECIMEN SOURCE: NORMAL
SQUAMOUS #/AREA URNS HPF: ABNORMAL /HPF
TARGETS BLD QL SMEAR: ABNORMAL
TROPONIN I SERPL DL<=0.01 NG/ML-MCNC: 0.66 NG/ML
TROPONIN I SERPL DL<=0.01 NG/ML-MCNC: 0.68 NG/ML
URN SPEC COLLECT METH UR: ABNORMAL
UROBILINOGEN UR STRIP-ACNC: NEGATIVE EU/DL
WBC # BLD AUTO: 7.22 K/UL
WBC #/AREA URNS HPF: 5 /HPF (ref 0–5)
WBC CLUMPS URNS QL MICRO: ABNORMAL
YEAST URNS QL MICRO: ABNORMAL

## 2017-12-19 PROCEDURE — 85025 COMPLETE CBC W/AUTO DIFF WBC: CPT

## 2017-12-19 PROCEDURE — 11000001 HC ACUTE MED/SURG PRIVATE ROOM

## 2017-12-19 PROCEDURE — 80053 COMPREHEN METABOLIC PANEL: CPT

## 2017-12-19 PROCEDURE — 82550 ASSAY OF CK (CPK): CPT

## 2017-12-19 PROCEDURE — 84484 ASSAY OF TROPONIN QUANT: CPT

## 2017-12-19 PROCEDURE — 99285 EMERGENCY DEPT VISIT HI MDM: CPT | Mod: 25

## 2017-12-19 PROCEDURE — 83735 ASSAY OF MAGNESIUM: CPT

## 2017-12-19 PROCEDURE — 96375 TX/PRO/DX INJ NEW DRUG ADDON: CPT

## 2017-12-19 PROCEDURE — 63600175 PHARM REV CODE 636 W HCPCS

## 2017-12-19 PROCEDURE — 83880 ASSAY OF NATRIURETIC PEPTIDE: CPT

## 2017-12-19 PROCEDURE — 81000 URINALYSIS NONAUTO W/SCOPE: CPT

## 2017-12-19 PROCEDURE — 63600175 PHARM REV CODE 636 W HCPCS: Performed by: EMERGENCY MEDICINE

## 2017-12-19 PROCEDURE — 96365 THER/PROPH/DIAG IV INF INIT: CPT

## 2017-12-19 PROCEDURE — 96376 TX/PRO/DX INJ SAME DRUG ADON: CPT

## 2017-12-19 PROCEDURE — 87400 INFLUENZA A/B EACH AG IA: CPT | Mod: 59

## 2017-12-19 PROCEDURE — 25000003 PHARM REV CODE 250

## 2017-12-19 PROCEDURE — 93005 ELECTROCARDIOGRAM TRACING: CPT

## 2017-12-19 PROCEDURE — G0378 HOSPITAL OBSERVATION PER HR: HCPCS

## 2017-12-19 RX ORDER — OSELTAMIVIR PHOSPHATE 75 MG/1
75 CAPSULE ORAL DAILY
Status: DISCONTINUED | OUTPATIENT
Start: 2017-12-19 | End: 2017-12-22 | Stop reason: HOSPADM

## 2017-12-19 RX ORDER — ISOSORBIDE DINITRATE 20 MG/1
20 TABLET ORAL 2 TIMES DAILY
Status: DISCONTINUED | OUTPATIENT
Start: 2017-12-19 | End: 2017-12-22 | Stop reason: HOSPADM

## 2017-12-19 RX ORDER — IBUPROFEN 200 MG
24 TABLET ORAL
Status: DISCONTINUED | OUTPATIENT
Start: 2017-12-19 | End: 2017-12-22 | Stop reason: HOSPADM

## 2017-12-19 RX ORDER — HYDRALAZINE HYDROCHLORIDE 25 MG/1
25 TABLET, FILM COATED ORAL 2 TIMES DAILY
Status: DISCONTINUED | OUTPATIENT
Start: 2017-12-19 | End: 2017-12-22 | Stop reason: HOSPADM

## 2017-12-19 RX ORDER — SODIUM CHLORIDE 0.9 % (FLUSH) 0.9 %
5 SYRINGE (ML) INJECTION
Status: DISCONTINUED | OUTPATIENT
Start: 2017-12-19 | End: 2017-12-22 | Stop reason: HOSPADM

## 2017-12-19 RX ORDER — HYDRALAZINE HYDROCHLORIDE 20 MG/ML
20 INJECTION INTRAMUSCULAR; INTRAVENOUS
Status: COMPLETED | OUTPATIENT
Start: 2017-12-19 | End: 2017-12-19

## 2017-12-19 RX ORDER — METHYLPREDNISOLONE SOD SUCC 125 MG
125 VIAL (EA) INJECTION EVERY 8 HOURS
Status: COMPLETED | OUTPATIENT
Start: 2017-12-19 | End: 2017-12-20

## 2017-12-19 RX ORDER — IBUPROFEN 200 MG
16 TABLET ORAL
Status: DISCONTINUED | OUTPATIENT
Start: 2017-12-19 | End: 2017-12-22 | Stop reason: HOSPADM

## 2017-12-19 RX ORDER — ONDANSETRON 2 MG/ML
4 INJECTION INTRAMUSCULAR; INTRAVENOUS EVERY 8 HOURS PRN
Status: DISCONTINUED | OUTPATIENT
Start: 2017-12-19 | End: 2017-12-22 | Stop reason: HOSPADM

## 2017-12-19 RX ORDER — GLUCAGON 1 MG
1 KIT INJECTION
Status: DISCONTINUED | OUTPATIENT
Start: 2017-12-19 | End: 2017-12-22 | Stop reason: HOSPADM

## 2017-12-19 RX ORDER — FUROSEMIDE 10 MG/ML
80 INJECTION INTRAMUSCULAR; INTRAVENOUS
Status: COMPLETED | OUTPATIENT
Start: 2017-12-19 | End: 2017-12-19

## 2017-12-19 RX ORDER — IPRATROPIUM BROMIDE AND ALBUTEROL SULFATE 2.5; .5 MG/3ML; MG/3ML
3 SOLUTION RESPIRATORY (INHALATION) EVERY 4 HOURS
Status: DISCONTINUED | OUTPATIENT
Start: 2017-12-20 | End: 2017-12-22 | Stop reason: HOSPADM

## 2017-12-19 RX ADMIN — HYDRALAZINE HYDROCHLORIDE 25 MG: 25 TABLET, FILM COATED ORAL at 11:12

## 2017-12-19 RX ADMIN — METHYLPREDNISOLONE SODIUM SUCCINATE 125 MG: 125 INJECTION, POWDER, FOR SOLUTION INTRAMUSCULAR; INTRAVENOUS at 11:12

## 2017-12-19 RX ADMIN — APIXABAN 5 MG: 5 TABLET, FILM COATED ORAL at 11:12

## 2017-12-19 RX ADMIN — FUROSEMIDE 80 MG: 10 INJECTION, SOLUTION INTRAMUSCULAR; INTRAVENOUS at 10:12

## 2017-12-19 RX ADMIN — HYDRALAZINE HYDROCHLORIDE 20 MG: 20 INJECTION INTRAMUSCULAR; INTRAVENOUS at 09:12

## 2017-12-19 RX ADMIN — AZITHROMYCIN MONOHYDRATE 500 MG: 500 INJECTION, POWDER, LYOPHILIZED, FOR SOLUTION INTRAVENOUS at 11:12

## 2017-12-19 RX ADMIN — OSELTAMIVIR PHOSPHATE 75 MG: 75 CAPSULE ORAL at 11:12

## 2017-12-20 PROBLEM — I50.23 ACUTE ON CHRONIC SYSTOLIC HEART FAILURE: Status: ACTIVE | Noted: 2017-12-19

## 2017-12-20 LAB
ALBUMIN SERPL BCP-MCNC: 2.9 G/DL
ALLENS TEST: ABNORMAL
ALP SERPL-CCNC: 59 U/L
ALT SERPL W/O P-5'-P-CCNC: 35 U/L
ANION GAP SERPL CALC-SCNC: 9 MMOL/L
ANISOCYTOSIS BLD QL SMEAR: ABNORMAL
AST SERPL-CCNC: 25 U/L
BASOPHILS # BLD AUTO: ABNORMAL K/UL
BASOPHILS NFR BLD: 0 %
BILIRUB SERPL-MCNC: 3 MG/DL
BUN SERPL-MCNC: 25 MG/DL
CALCIUM SERPL-MCNC: 8.7 MG/DL
CHLORIDE SERPL-SCNC: 106 MMOL/L
CO2 SERPL-SCNC: 25 MMOL/L
CREAT SERPL-MCNC: 1 MG/DL
DACRYOCYTES BLD QL SMEAR: ABNORMAL
DELSYS: ABNORMAL
DIFFERENTIAL METHOD: ABNORMAL
EOSINOPHIL # BLD AUTO: ABNORMAL K/UL
EOSINOPHIL NFR BLD: 0 %
ERYTHROCYTE [DISTWIDTH] IN BLOOD BY AUTOMATED COUNT: 27.7 %
EST. GFR  (AFRICAN AMERICAN): >60 ML/MIN/1.73 M^2
EST. GFR  (NON AFRICAN AMERICAN): >60 ML/MIN/1.73 M^2
FIO2: 21
GLOBAL PERICARDIAL EFFUSION: ABNORMAL
GLUCOSE SERPL-MCNC: 176 MG/DL
HCO3 UR-SCNC: 24.8 MMOL/L (ref 24–28)
HCT VFR BLD AUTO: 33.4 %
HGB BLD-MCNC: 9.8 G/DL
HYPOCHROMIA BLD QL SMEAR: ABNORMAL
LYMPHOCYTES # BLD AUTO: ABNORMAL K/UL
LYMPHOCYTES NFR BLD: 13 %
MAGNESIUM SERPL-MCNC: 1.6 MG/DL
MCH RBC QN AUTO: 16.6 PG
MCHC RBC AUTO-ENTMCNC: 29.3 G/DL
MCV RBC AUTO: 57 FL
MITRAL VALVE REGURGITATION: ABNORMAL
MODE: ABNORMAL
MONOCYTES # BLD AUTO: ABNORMAL K/UL
MONOCYTES NFR BLD: 0 %
NEUTROPHILS NFR BLD: 87 %
NRBC BLD-RTO: ABNORMAL /100 WBC
OVALOCYTES BLD QL SMEAR: ABNORMAL
PCO2 BLDA: 37.3 MMHG (ref 35–45)
PEEP: 5
PH SMN: 7.43 [PH] (ref 7.35–7.45)
PHOSPHATE SERPL-MCNC: 3.5 MG/DL
PLATELET # BLD AUTO: 234 K/UL
PLATELET BLD QL SMEAR: ABNORMAL
PMV BLD AUTO: ABNORMAL FL
PO2 BLDA: 69 MMHG (ref 80–100)
POC BE: 1 MMOL/L
POC SATURATED O2: 94 % (ref 95–100)
POC TCO2: 26 MMOL/L (ref 23–27)
POIKILOCYTOSIS BLD QL SMEAR: ABNORMAL
POLYCHROMASIA BLD QL SMEAR: ABNORMAL
POTASSIUM SERPL-SCNC: 3.8 MMOL/L
PROT SERPL-MCNC: 6.3 G/DL
RBC # BLD AUTO: 5.9 M/UL
RETIRED EF AND QEF - SEE NOTES: 20 (ref 55–65)
SAMPLE: ABNORMAL
SCHISTOCYTES BLD QL SMEAR: PRESENT
SITE: ABNORMAL
SODIUM SERPL-SCNC: 140 MMOL/L
TARGETS BLD QL SMEAR: ABNORMAL
TROPONIN I SERPL DL<=0.01 NG/ML-MCNC: 0.64 NG/ML
TROPONIN I SERPL DL<=0.01 NG/ML-MCNC: 0.66 NG/ML
TROPONIN I SERPL DL<=0.01 NG/ML-MCNC: 0.68 NG/ML
TROPONIN I SERPL DL<=0.01 NG/ML-MCNC: 0.78 NG/ML
WBC # BLD AUTO: 5.27 K/UL

## 2017-12-20 PROCEDURE — 25000003 PHARM REV CODE 250: Performed by: NURSE PRACTITIONER

## 2017-12-20 PROCEDURE — 63600175 PHARM REV CODE 636 W HCPCS

## 2017-12-20 PROCEDURE — 80053 COMPREHEN METABOLIC PANEL: CPT

## 2017-12-20 PROCEDURE — 11000001 HC ACUTE MED/SURG PRIVATE ROOM

## 2017-12-20 PROCEDURE — 27000221 HC OXYGEN, UP TO 24 HOURS

## 2017-12-20 PROCEDURE — 63600175 PHARM REV CODE 636 W HCPCS: Performed by: FAMILY MEDICINE

## 2017-12-20 PROCEDURE — 93306 TTE W/DOPPLER COMPLETE: CPT

## 2017-12-20 PROCEDURE — 94640 AIRWAY INHALATION TREATMENT: CPT

## 2017-12-20 PROCEDURE — 27000190 HC CPAP FULL FACE MASK W/VALVE

## 2017-12-20 PROCEDURE — 84484 ASSAY OF TROPONIN QUANT: CPT | Mod: 91

## 2017-12-20 PROCEDURE — 84100 ASSAY OF PHOSPHORUS: CPT

## 2017-12-20 PROCEDURE — 99233 SBSQ HOSP IP/OBS HIGH 50: CPT | Mod: ,,, | Performed by: INTERNAL MEDICINE

## 2017-12-20 PROCEDURE — 25000242 PHARM REV CODE 250 ALT 637 W/ HCPCS

## 2017-12-20 PROCEDURE — 36415 COLL VENOUS BLD VENIPUNCTURE: CPT

## 2017-12-20 PROCEDURE — 25000003 PHARM REV CODE 250

## 2017-12-20 PROCEDURE — 85027 COMPLETE CBC AUTOMATED: CPT

## 2017-12-20 PROCEDURE — G0378 HOSPITAL OBSERVATION PER HR: HCPCS

## 2017-12-20 PROCEDURE — 25000003 PHARM REV CODE 250: Performed by: FAMILY MEDICINE

## 2017-12-20 PROCEDURE — 36600 WITHDRAWAL OF ARTERIAL BLOOD: CPT

## 2017-12-20 PROCEDURE — 94660 CPAP INITIATION&MGMT: CPT

## 2017-12-20 PROCEDURE — 82803 BLOOD GASES ANY COMBINATION: CPT

## 2017-12-20 PROCEDURE — 94761 N-INVAS EAR/PLS OXIMETRY MLT: CPT

## 2017-12-20 PROCEDURE — 51702 INSERT TEMP BLADDER CATH: CPT

## 2017-12-20 PROCEDURE — 83735 ASSAY OF MAGNESIUM: CPT

## 2017-12-20 PROCEDURE — 93005 ELECTROCARDIOGRAM TRACING: CPT

## 2017-12-20 PROCEDURE — 87040 BLOOD CULTURE FOR BACTERIA: CPT

## 2017-12-20 PROCEDURE — 85007 BL SMEAR W/DIFF WBC COUNT: CPT | Mod: NCS

## 2017-12-20 RX ORDER — CARVEDILOL 25 MG/1
25 TABLET ORAL 2 TIMES DAILY
Status: DISCONTINUED | OUTPATIENT
Start: 2017-12-20 | End: 2017-12-22 | Stop reason: HOSPADM

## 2017-12-20 RX ORDER — ACETAMINOPHEN 325 MG/1
650 TABLET ORAL
Status: COMPLETED | OUTPATIENT
Start: 2017-12-20 | End: 2017-12-20

## 2017-12-20 RX ORDER — FUROSEMIDE 10 MG/ML
80 INJECTION INTRAMUSCULAR; INTRAVENOUS 2 TIMES DAILY
Status: DISCONTINUED | OUTPATIENT
Start: 2017-12-20 | End: 2017-12-20

## 2017-12-20 RX ORDER — NITROGLYCERIN 0.4 MG/1
0.4 TABLET SUBLINGUAL EVERY 5 MIN PRN
Status: DISCONTINUED | OUTPATIENT
Start: 2017-12-20 | End: 2017-12-22 | Stop reason: HOSPADM

## 2017-12-20 RX ORDER — DIGOXIN 125 MCG
0.12 TABLET ORAL DAILY
Status: DISCONTINUED | OUTPATIENT
Start: 2017-12-20 | End: 2017-12-22 | Stop reason: HOSPADM

## 2017-12-20 RX ORDER — BENZONATATE 100 MG/1
100 CAPSULE ORAL 3 TIMES DAILY PRN
Status: DISCONTINUED | OUTPATIENT
Start: 2017-12-20 | End: 2017-12-22 | Stop reason: HOSPADM

## 2017-12-20 RX ORDER — PANTOPRAZOLE SODIUM 40 MG/1
40 TABLET, DELAYED RELEASE ORAL DAILY
Status: DISCONTINUED | OUTPATIENT
Start: 2017-12-20 | End: 2017-12-22

## 2017-12-20 RX ORDER — LOSARTAN POTASSIUM 25 MG/1
25 TABLET ORAL DAILY
Status: DISCONTINUED | OUTPATIENT
Start: 2017-12-20 | End: 2017-12-21

## 2017-12-20 RX ORDER — FUROSEMIDE 10 MG/ML
80 INJECTION INTRAMUSCULAR; INTRAVENOUS 3 TIMES DAILY
Status: DISCONTINUED | OUTPATIENT
Start: 2017-12-20 | End: 2017-12-21

## 2017-12-20 RX ADMIN — CARVEDILOL 25 MG: 25 TABLET, FILM COATED ORAL at 09:12

## 2017-12-20 RX ADMIN — FUROSEMIDE 80 MG: 10 INJECTION, SOLUTION INTRAMUSCULAR; INTRAVENOUS at 08:12

## 2017-12-20 RX ADMIN — ISOSORBIDE DINITRATE 20 MG: 20 TABLET ORAL at 01:12

## 2017-12-20 RX ADMIN — IPRATROPIUM BROMIDE AND ALBUTEROL SULFATE 3 ML: .5; 3 SOLUTION RESPIRATORY (INHALATION) at 11:12

## 2017-12-20 RX ADMIN — LOSARTAN POTASSIUM 25 MG: 25 TABLET ORAL at 03:12

## 2017-12-20 RX ADMIN — IPRATROPIUM BROMIDE AND ALBUTEROL SULFATE 3 ML: .5; 3 SOLUTION RESPIRATORY (INHALATION) at 07:12

## 2017-12-20 RX ADMIN — FUROSEMIDE 80 MG: 10 INJECTION, SOLUTION INTRAMUSCULAR; INTRAVENOUS at 02:12

## 2017-12-20 RX ADMIN — HYDRALAZINE HYDROCHLORIDE 25 MG: 25 TABLET, FILM COATED ORAL at 08:12

## 2017-12-20 RX ADMIN — MOXIFLOXACIN HYDROCHLORIDE 400 MG: 400 INJECTION, SOLUTION INTRAVENOUS at 11:12

## 2017-12-20 RX ADMIN — METHYLPREDNISOLONE SODIUM SUCCINATE 125 MG: 125 INJECTION, POWDER, FOR SOLUTION INTRAMUSCULAR; INTRAVENOUS at 06:12

## 2017-12-20 RX ADMIN — ISOSORBIDE DINITRATE 20 MG: 20 TABLET ORAL at 09:12

## 2017-12-20 RX ADMIN — APIXABAN 5 MG: 5 TABLET, FILM COATED ORAL at 08:12

## 2017-12-20 RX ADMIN — ISOSORBIDE DINITRATE 20 MG: 20 TABLET ORAL at 10:12

## 2017-12-20 RX ADMIN — CARVEDILOL 25 MG: 25 TABLET, FILM COATED ORAL at 08:12

## 2017-12-20 RX ADMIN — PANTOPRAZOLE SODIUM 40 MG: 40 TABLET, DELAYED RELEASE ORAL at 08:12

## 2017-12-20 RX ADMIN — METHYLPREDNISOLONE SODIUM SUCCINATE 125 MG: 125 INJECTION, POWDER, FOR SOLUTION INTRAMUSCULAR; INTRAVENOUS at 02:12

## 2017-12-20 RX ADMIN — DIGOXIN 0.12 MG: 0.12 TABLET ORAL at 10:12

## 2017-12-20 RX ADMIN — IPRATROPIUM BROMIDE AND ALBUTEROL SULFATE 3 ML: .5; 3 SOLUTION RESPIRATORY (INHALATION) at 02:12

## 2017-12-20 RX ADMIN — BENZONATATE 100 MG: 100 CAPSULE ORAL at 03:12

## 2017-12-20 RX ADMIN — FUROSEMIDE 80 MG: 10 INJECTION, SOLUTION INTRAMUSCULAR; INTRAVENOUS at 09:12

## 2017-12-20 RX ADMIN — APIXABAN 5 MG: 5 TABLET, FILM COATED ORAL at 09:12

## 2017-12-20 RX ADMIN — ACETAMINOPHEN 650 MG: 325 TABLET ORAL at 08:12

## 2017-12-20 RX ADMIN — OSELTAMIVIR PHOSPHATE 75 MG: 75 CAPSULE ORAL at 08:12

## 2017-12-20 RX ADMIN — HYDRALAZINE HYDROCHLORIDE 25 MG: 25 TABLET, FILM COATED ORAL at 09:12

## 2017-12-20 RX ADMIN — IPRATROPIUM BROMIDE AND ALBUTEROL SULFATE 3 ML: .5; 3 SOLUTION RESPIRATORY (INHALATION) at 04:12

## 2017-12-20 NOTE — ASSESSMENT & PLAN NOTE
- on admit, elevated BNP 2547 with findings suggestive of acute HF on CXR  - no edema or JVD, without appreciable crackles on lung exam  - 2D echo EF 40 with DD (9/2017) s/p ICD placement  - Restarted home isosorbide dinitrate, hydralazine, carvedilol  - lasix IV 80 mg BID  - strict I/O, daily weights. Net -1075 since admit  - 2D echo pending

## 2017-12-20 NOTE — ASSESSMENT & PLAN NOTE
- Denies smoking hx but reports excess second hand smoke  - on solumedrol 125 mg q8h, duo-nebs q4  - moxifloxacin IV  - plan to transition to PO meds  - may require outpatient PFT if patient has not had one recently

## 2017-12-20 NOTE — ASSESSMENT & PLAN NOTE
- previous hx of similar symptoms in 9/2017 with symptoms that improved on prednisone. Had concerns for stress or mold  - likely multifactorial  - started treatment for both acute HF and COPD  - patient has history of baseline anemia  - suspect depression/anxiety component also  - ABG pending

## 2017-12-20 NOTE — ED NOTES
Respiratory therapy contacted about need for arterial blood gas, duo-neb treatment, and CPAP. Told RT that the pt was planned to be moved to ED room 5.

## 2017-12-20 NOTE — ASSESSMENT & PLAN NOTE
- on admit, elevated BNP 2547 with findings suggestive of acute HF on CXR  - no edema or JVD, without appreciable crackles on lung exam  - 2D echo EF 40 with DD (9/2017) s/p ICD placement  - on home isosorbide dinitrate, hydralazine, carvedilol  - holding home lasix  - lasix IV 80 mg BID  - strict I/O, daily weights

## 2017-12-20 NOTE — HPI
52 y.o. F with HFrEF (EF 40) with diastolic dysfunction s/p pacemaker, CAD, HTN, AFib, MELISSA, comes in complaining of SOB. Patient is teary eyed and gives a poor history. Reports that she has shortness of breath all the time due to her COPD and HF. Denies using home oxygen. Does not remember a point when she was not SOB. However reports that 3 weeks ago she was diagnosed with a URI with productive cough, chest tightness and congestion, myalgias , dyspnea on exertion, weakness, and dizziness. Reports that her persistent cough was evaluated by her cardiologist who discontinued her losartan but continues to have a cough.    On chart review, patient was seen earlier this week with similar complaints and the ER was concerned for possible undiagnosed depression.     Reports diagnosed with MELISSA several years ago however does not use the CPAP because it is uncomfortable. Currently lives at home with her son and her granddaughter. Reports continuing taking all of her medications as prescribed.

## 2017-12-20 NOTE — HOSPITAL COURSE
12/20/2017 Patient presented to the ED with progressively worsening SOB x 3 weeks. LVEF 20-25% with severe LAE, normal RV function, small pericardial effusion, mild MR per Echo. BNP elevated 2547, Tbili 3, TNI 0.095, 0.655, 0.684, 0.776, 0.683. ECG SR with LVH no acute ischemic changes. Influenza negative. Hypertensive with SBP >180- responded to hydralazine. Patient was given RT, steroids, abx, tamiflu, and IV Lasix in the ED. Responding well to treatment.    12/21/2017 Place on IV Lasix 80mg TID with good response and 4.2 liters out overnight. SOB and abdominal distension improving. Lasix down titrated to 40mg IV BID today. Troponin elevated at [phone] higher than previous baseline. Likely related to ADHF but given complaints of chest pain will do nuclear stress test in AM. Originally restarted on ARB due to continuation of cough but likely needs longer discontinuation period. Continue BB, Digoxin, Isordil and Hydralazine for good afterload reduction   12/22/2017 Remains of IV Lasix BID with good response. 2.8 liters out overnight negative 6.3 liters since admission. SOB improved along with abdominal distension. Stress test this AM fixed defect and no active ischemia

## 2017-12-20 NOTE — ED PROVIDER NOTES
"Encounter Date: 12/19/2017    SCRIBE #1 NOTE: I, Imani Novak, am scribing for, and in the presence of, Dr. Charles.       History     Chief Complaint   Patient presents with    Shortness of Breath     x 3 weeks dx with uri, complains of body aches     Dizziness     x 2-3 days, fall x2 in 2 days      Time seen by provider: 8:21 PM    This is a 52 y.o. female with CHF, CAD, HTN, and COPD who has a pacemaker. She presents with complaint of shortness of breath onset 3 weeks ago since she was diagnosed with a URI. The patient explains that her breathing is irregular. She associates productive cough, chest tightness, body aches, upper abdominal pain, rib pain, dyspnea on exertion, weakness, and dizziness onset approximately 3 days ago.  Her sputum is sometimes clear and sometimes green. The patient says she cannot sleep on her back. The patient does not smoke.        The history is provided by the patient.     Review of patient's allergies indicates:   Allergen Reactions    Penicillins Hives    Iodinated contrast- oral and iv dye Nausea And Vomiting    Percocet [oxycodone-acetaminophen] Other (See Comments)     Nausea, Dizziness, Anxiety.  "I don't like how it makes me feel."   Given Hydromorphone 0.5mg IVP  Without problems.    Diovan hct [valsartan-hydrochlorothiazide] Other (See Comments)     Causes coughing    Tramadol Nausea And Vomiting     Past Medical History:   Diagnosis Date    Anemia     Anticoagulant long-term use     Arthritis     Atrial fibrillation     Congestive heart failure     COPD (chronic obstructive pulmonary disease)     Encounter for blood transfusion     GERD (gastroesophageal reflux disease)     Hypertension     Right kidney mass     Sleep apnea     Thyroid disease      Past Surgical History:   Procedure Laterality Date    APPENDECTOMY      CARDIAC DEFIBRILLATOR PLACEMENT Left 12/2016    CARDIAC DEFIBRILLATOR PLACEMENT  2016    EYE SURGERY      FRACTURE SURGERY Left     " hand 5th digit    HYSTERECTOMY      left knee mass removal Left      History reviewed. No pertinent family history.  Social History   Substance Use Topics    Smoking status: Never Smoker    Smokeless tobacco: Never Used    Alcohol use Yes      Comment: occaisionally     Review of Systems   Constitutional: Negative for fever.        Body aches   HENT: Negative for sore throat.    Respiratory: Positive for cough, chest tightness and shortness of breath.    Cardiovascular: Negative for chest pain.   Gastrointestinal: Positive for abdominal pain. Negative for nausea.   Genitourinary: Negative for dysuria.   Musculoskeletal: Positive for myalgias. Negative for back pain.        Rib pain     Skin: Negative for rash.   Neurological: Positive for dizziness and weakness.   Hematological: Does not bruise/bleed easily.       Physical Exam     Initial Vitals [12/19/17 1800]   BP Pulse Resp Temp SpO2   (!) 183/119 109 20 97.8 °F (36.6 °C) 96 %      MAP       140.33         Physical Exam    Nursing note and vitals reviewed.  Constitutional: She appears well-developed and well-nourished. She is not diaphoretic. No distress.   HENT:   Head: Normocephalic and atraumatic.   Mouth/Throat: Oropharynx is clear and moist.   Eyes: EOM are normal. Pupils are equal, round, and reactive to light.   Conjunctiva slightly pale   Neck: Normal range of motion. Neck supple.   Cardiovascular: Regular rhythm, normal heart sounds and intact distal pulses. Tachycardia present.  Exam reveals no gallop and no friction rub.    No murmur heard.  Pulmonary/Chest: She has no wheezes. She has no rhonchi. She has no rales.   diminished breath sounds bilaterally   Abdominal: Soft. She exhibits no distension. There is tenderness.   Epigastric abdomen tender   Musculoskeletal: Normal range of motion.   Neurological: She is alert and oriented to person, place, and time. She has normal strength.   Skin: Skin is warm and dry. No rash noted. No erythema.   No  pitting edema   Psychiatric: She has a normal mood and affect.         ED Course   Procedures  Labs Reviewed   COMPREHENSIVE METABOLIC PANEL - Abnormal; Notable for the following:        Result Value    Chloride 111 (*)     CO2 19 (*)     BUN, Bld 24 (*)     Albumin 2.9 (*)     Total Bilirubin 3.0 (*)     eGFR if non  58 (*)     All other components within normal limits   TROPONIN I - Abnormal; Notable for the following:     Troponin I 0.655 (*)     All other components within normal limits   URINALYSIS - Abnormal; Notable for the following:     Protein, UA 3+ (*)     Bilirubin (UA) 1+ (*)     Occult Blood UA 3+ (*)     All other components within normal limits   URINALYSIS MICROSCOPIC - Abnormal; Notable for the following:     Bacteria, UA Few (*)     All other components within normal limits   CBC W/ AUTO DIFFERENTIAL - Abnormal; Notable for the following:     RBC 5.87 (*)     Hemoglobin 10.1 (*)     Hematocrit 33.4 (*)     MCV 57 (*)     MCH 17.2 (*)     MCHC 30.2 (*)     RDW 27.9 (*)     nRBC 4@L=100 (*)     All other components within normal limits   INFLUENZA A AND B ANTIGEN   CK   MAGNESIUM   COMPREHENSIVE METABOLIC PANEL   CK   TROPONIN I   B-TYPE NATRIURETIC PEPTIDE   MAGNESIUM     EKG Readings: (Independently Interpreted)   Rhythm: Sinus Tachycardia. Heart Rate: 109. ST Segments: Normal ST Segments.   Left ventricular hypertrophy       X-Rays:   Independently Interpreted Readings:   Other Readings:  Reviewed by myself, read by radiology.       X-Ray Chest 1 View   Final Result       Stable cardiomegaly with left-sided pleural effusion and increased attenuation of the pulmonary parenchyma.  The findings are suggestive of CHF.                     Electronically signed by: NADIYA LOGAN MD   Date:     12/19/17   Time:    21:42       X-Ray Chest PA And Lateral   Final Result    Stable cardiomegaly. No definite acute change.         Electronically signed by: SHARON CURTIS MD   Date:      12/19/17   Time:    19:01           Medical Decision Making:   History:   Old Medical Records: I decided to obtain old medical records.  Initial Assessment:       Clinical Tests:   Lab Tests: Ordered and Reviewed  Radiological Study: Ordered and Reviewed  Medical Tests: Ordered and Reviewed                   ED Course      Clinical Impression:   Diagnoses of Chest pain, Cough, and Shortness of breath were pertinent to this visit.  1. Chest pain    2. Cough    3. Shortness of breath                  I, Dr. Theodore Perry, personally performed the services described in this documentation. All medical record entries made by the scribe were at my direction and in my presence. I have reviewed the chart and agree that the record reflects my personal performance and is accurate and complete. Theodore Perry MD.  10:11 PM 12/19/2017                 Theodore Perry MD  12/19/17 6692

## 2017-12-20 NOTE — CONSULTS
[facility]  Cardiology  Consult Note    Patient Name: Hafsa Hawley  MRN: [id number]  Admission Date: 12/19/2017  Hospital Length of Stay: 0 days  Code Status: Full Code   Attending Provider: Burke Shah MD   Consulting Provider: Juan Keyes NP  Primary Care Physician: Haylie Lion MD  Principal Problem:Shortness of breath    Patient information was obtained from patient and ER records.     Inpatient consult to [facility]  Consult performed by: JUAN KEYES  Consult ordered by: STELLA WHIPPLE        Subjective:     Chief Complaint:  SOB     HPI:   Hafsa Hawley is a 52 y.o. Female  with HFrEF s/p ICD, HTN, AFib, MELISSA, who presented to the ED with SOB. Patient reports SOB chronic with recent progressive worsening over the past 3 weeks. At that time she was diagnosed with a URI with productive cough, chest tightness and congestion, myalgias , dyspnea on exertion, weakness, edema, chest pain and dizziness. Reports that her persistent cough was evaluated by her cardiologist (Dr Cortez) who discontinued her losartan without relief.  but continues to have a cough. Patient denies dizziness, palpitations, orthopnea, PND. Reports compliance with low sodium diet and medications. She is presently without any distress in the ED and reports her SOB has improved. Chest congestion persists.     Past Medical History:   Diagnosis Date    Anemia     Anticoagulant long-term use     Arthritis     Atrial fibrillation     Congestive heart failure     COPD (chronic obstructive pulmonary disease)     Encounter for blood transfusion     GERD (gastroesophageal reflux disease)     Hypertension     Right kidney mass     Sleep apnea     Thyroid disease        Past Surgical History:   Procedure Laterality Date    APPENDECTOMY      CARDIAC DEFIBRILLATOR PLACEMENT Left 12/2016    CARDIAC DEFIBRILLATOR PLACEMENT  2016    EYE SURGERY      FRACTURE SURGERY Left     hand  "5th digit    HYSTERECTOMY      left knee mass removal Left        Review of patient's allergies indicates:   Allergen Reactions    Penicillins Hives    Iodinated contrast- oral and iv dye Nausea And Vomiting    Percocet [oxycodone-acetaminophen] Other (See Comments)     Nausea, Dizziness, Anxiety.  "I don't like how it makes me feel."   Given Hydromorphone 0.5mg IVP  Without problems.    Diovan hct [valsartan-hydrochlorothiazide] Other (See Comments)     Causes coughing    Tramadol Nausea And Vomiting       No current facility-administered medications on file prior to encounter.      Current Outpatient Prescriptions on File Prior to Encounter   Medication Sig    albuterol-ipratropium 2.5mg-0.5mg/3mL (DUO-NEB) 0.5 mg-3 mg(2.5 mg base)/3 mL nebulizer solution Take 1 mL by nebulization 2 (two) times daily.    alprazolam (XANAX) 2 MG Tab Take 1 tablet by mouth as needed.    b complex vitamins tablet Take 1 tablet by mouth once daily.    benzonatate (TESSALON) 100 MG capsule Take 1 capsule (100 mg total) by mouth 3 (three) times daily as needed for Cough.    carvedilol (COREG) 25 MG tablet Take 1 tablet (25 mg total) by mouth 2 (two) times daily.    cetirizine (ZYRTEC) 10 MG tablet Take 1 tablet by mouth once daily.    cyanocobalamin, vitamin B-12, 50 mcg tablet Take 5 mcg by mouth once daily.    DIGOX 125 mcg tablet TAKE 1 TABLET BY MOUTH EVERY DAY    ELIQUIS 5 mg Tab TAKE 1 TABLET BY MOUTH TWICE DAILY    fluticasone (FLONASE) 50 mcg/actuation nasal spray SHAKE WELL AND U 1 SPR IEN QD    furosemide (LASIX) 80 MG tablet Take 1 tablet (80 mg total) by mouth 2 (two) times daily.    gabapentin (NEURONTIN) 100 MG capsule Take 1 capsule by mouth every evening.    guaifenesin 100 mg/5 ml (ROBITUSSIN) 100 mg/5 mL syrup Take 200 mg by mouth 3 (three) times daily as needed for Cough.    hydrALAZINE (APRESOLINE) 25 MG tablet TAKE 1 TABLET BY MOUTH TWICE DAILY    hydrocodone-acetaminophen 10-325mg (NORCO) "  mg Tab TK 1 T PO Q 12 H PRN P    ipratropium-albuterol (COMBIVENT RESPIMAT)  mcg/actuation inhaler Inhale 1 puff into the lungs 2 (two) times daily. Rescue    isosorbide dinitrate (ISORDIL) 20 MG tablet TAKE 1 TABLET BY MOUTH TWICE DAILY    lidocaine HCL 2% (XYLOCAINE) 2 % jelly Apply topically as needed.    methylPREDNISolone (MEDROL DOSEPACK) 4 mg tablet Take as directed on the packet.    NITROSTAT 0.4 mg SL tablet Take 1 mg by mouth as needed.    pantoprazole (PROTONIX) 40 MG tablet Take 1 tablet by mouth once daily.    potassium chloride (KLOR-CON) 10 MEQ TbSR Take 1 tablet by mouth once daily.    PULMICORT FLEXHALER 90 mcg/actuation AePB Inhale 1 puff (90 mcg total) into the lungs 2 (two) times daily.    SPIRIVA WITH HANDIHALER 18 mcg inhalation capsule Inhale 1 capsule (18 mcg total) into the lungs once daily. Controller    VENTOLIN HFA 90 mcg/actuation inhaler Inhale 2 puffs into the lungs 2 (two) times daily.     Family History     None        Social History Main Topics    Smoking status: Never Smoker    Smokeless tobacco: Never Used    Alcohol use Yes      Comment: occaisionally    Drug use: No    Sexual activity: Yes     Partners: Male     Review of Systems   Constitution: Positive for malaise/fatigue. Negative for diaphoresis, weight gain and weight loss.   HENT: Negative.    Eyes: Negative.    Cardiovascular: Positive for dyspnea on exertion. Negative for chest pain, irregular heartbeat, leg swelling, near-syncope, orthopnea, palpitations, paroxysmal nocturnal dyspnea and syncope.   Respiratory: Positive for cough, shortness of breath, sleep disturbances due to breathing and sputum production. Negative for wheezing.    Endocrine: Negative.    Hematologic/Lymphatic: Negative.    Skin: Negative.    Musculoskeletal: Negative.    Gastrointestinal: Positive for bloating. Negative for abdominal pain, nausea and vomiting.   Genitourinary: Negative.    Neurological: Negative.     Psychiatric/Behavioral: Negative.    Allergic/Immunologic: Negative.      Objective:     Vital Signs (Most Recent):  Temp: 97.7 °F (36.5 °C) (12/20/17 1233)  Pulse: 80 (12/20/17 1250)  Resp: (!) 21 (12/20/17 1250)  BP: (!) 146/68 (12/20/17 1250)  SpO2: 97 % (12/20/17 1250) Vital Signs (24h Range):  Temp:  [97.7 °F (36.5 °C)-98.9 °F (37.2 °C)] 97.7 °F (36.5 °C)  Pulse:  [] 80  Resp:  [18-23] 21  SpO2:  [94 %-100 %] 97 %  BP: (110-186)/() 146/68     Weight: 93.9 kg (207 lb)  Body mass index is 33.41 kg/m².    SpO2: 97 %  O2 Device (Oxygen Therapy): nasal cannula      Intake/Output Summary (Last 24 hours) at 12/20/17 1327  Last data filed at 12/20/17 1304   Gross per 24 hour   Intake                0 ml   Output             1925 ml   Net            -1925 ml       Lines/Drains/Airways     Drain                 Urethral Catheter 12/20/17 0200 Non-latex 14 Fr. less than 1 day          Peripheral Intravenous Line                 Peripheral IV - Single Lumen 12/19/17 2049 Right;Posterior Hand less than 1 day                Physical Exam   Constitutional: She is oriented to person, place, and time. She appears well-developed and well-nourished. No distress.   HENT:   Head: Normocephalic and atraumatic.   Eyes: Right eye exhibits no discharge. Left eye exhibits no discharge.   Neck: No JVD present.   Cardiovascular: Normal rate, regular rhythm and intact distal pulses.  PMI is displaced.    Murmur heard.  Pulmonary/Chest: Effort normal. No accessory muscle usage. No respiratory distress. She has decreased breath sounds.   Abdominal: Bowel sounds are normal. She exhibits distension.   Musculoskeletal: She exhibits no edema.   Neurological: She is alert and oriented to person, place, and time.   Skin: Skin is warm and dry. She is not diaphoretic.   Psychiatric: She has a normal mood and affect. Her behavior is normal. Judgment and thought content normal.       Significant Labs:   BMP:   Recent Labs  Lab  12/19/17 2043 12/19/17 2126 12/20/17 0317 12/20/17  0557    97  --  176*    143  --  140   K 4.3 3.9  --  3.8   * 110  --  106   CO2 19* 23  --  25   BUN 24* 25*  --  25*   CREATININE 1.1 1.1  --  1.0   CALCIUM 8.7 8.7  --  8.7   MG 1.7 1.8 1.6  --    , CMP   Recent Labs  Lab 12/19/17 2043 12/19/17 2126 12/20/17  0557    143 140   K 4.3 3.9 3.8   * 110 106   CO2 19* 23 25    97 176*   BUN 24* 25* 25*   CREATININE 1.1 1.1 1.0   CALCIUM 8.7 8.7 8.7   PROT 6.3 6.1 6.3   ALBUMIN 2.9* 2.9* 2.9*   BILITOT 3.0* 3.0* 3.0*   ALKPHOS 56 57 59   AST 30 27 25   ALT 34 34 35   ANIONGAP 12 10 9   ESTGFRAFRICA >60 >60 >60   EGFRNONAA 58* 58* >60   , CBC   Recent Labs  Lab 12/19/17 2126 12/20/17  1039   WBC 7.22 5.27   HGB 10.1* 9.8*   HCT 33.4* 33.4*    234   , INR No results for input(s): INR, PROTIME in the last 48 hours., Troponin   Recent Labs  Lab 12/20/17 0317 12/20/17  1039 12/20/17  1242   TROPONINI 0.776* 0.683* 0.661*    and All pertinent lab results from the last 24 hours have been reviewed.    Significant Imaging: Echocardiogram:   2D echo with color flow doppler:   Results for orders placed or performed during the hospital encounter of 12/19/17   2D echo with color flow doppler   Result Value Ref Range    EF 20 (A) 55 - 65    Mitral Valve Regurgitation MILD     Pericardial Effusion SMALL (A)      Assessment and Plan:     Acute on chronic systolic heart failure    LVEF 20-25% with severe LAE, normal RV function, small pericardial effusion, mild MR per Echo. Down from 40 % 3 mo ago but similar to echo 1 year ago.  NICM per [facility] in 01/2015 noting severe LVH, DD, and normal coronary arteries  Elevated BNP 2547, Tbili 3, TNI 0.095, 0.655, 0.684, 0.776, 0.683. ECG SR with LVH no acute ischemic changes; CXR with CHF pattern, pericardial effusion- small    Elevated TNI felt to be demand in setting of ADHF, acute illness, marked hypertension with SBP >180  Takes Lasix 80 mg  BID at home  Agree with Lasix 80 IV TID  Accurate I&O, daily weights  Continue Coreg, Bidil, and resume ARB (cough persisted post discontinuation- felt to be unrelated)             Cardiomyopathy, nonischemic    S/P ICD for primary prevention   Normal coronary arteries per [facility] in 01/2015  EF improved with optimal medical therapy to 40%, recent discontinuation of ARB in setting of cough which did not improve. Resume ARB, continue BB, bidil         Paroxysmal atrial fibrillation    SR PACs at present  Severe LAE per echo   Continue BB, digoxin, and Eliquis         Essential hypertension    SBP 130s-186  Continue Bidil, Coreg, Hydralazine, and resume ACEI - will uptitrate PRN         Elevated troponin    Mild elevation felt to be demand in setting of ADHF, COPD exacerbation, hypertensive urgency  Normal coronary arteries per [facility] 01/2015  ECG without acute ischemic changes             VTE Risk Mitigation         Ordered     apixaban tablet 5 mg  2 times daily     Route:  Oral        12/19/17 7942          Thank you for your consult. I will follow-up with patient. Please contact us if you have any additional questions.    Juan Cheema NP  Cardiology   [facility]

## 2017-12-20 NOTE — ASSESSMENT & PLAN NOTE
S/P ICD for primary prevention   Normal coronary arteries per [facility] in 01/2015  EF improved with optimal medical therapy to 40%, recent discontinuation of ARB in setting of cough which did not improve. Resume ARB, continue BB, bidil

## 2017-12-20 NOTE — ED NOTES
Dr. Shah's team notified of the fact that oseltamivir was administered already prior to blood culture draw. Physician states to go ahead and draw blood cultures

## 2017-12-20 NOTE — PROGRESS NOTES
Pt arrived to unit from ED via stretcher. AAOx4. VSS. NAD noted. Pt arrived with O2 @ 2L. Tele initiated on pt, per MD order. Bedrails up x 2. Bed low and locked, alarm on. Fall precautions maintained. Call light within reach. See full documented assessment on pertinent flowsheets. Will continue to monitor.

## 2017-12-20 NOTE — ASSESSMENT & PLAN NOTE
- Mildly elevated trop on admit. Trop have trended up. May be 2/2 demand ischemia however will continue to trend. No concerning findings on EKG  - Will continue to trend trop

## 2017-12-20 NOTE — ASSESSMENT & PLAN NOTE
Mild elevation felt to be demand in setting of ADHF, COPD exacerbation, hypertensive urgency  Normal coronary arteries per [facility] 01/2015  ECG without acute ischemic changes

## 2017-12-20 NOTE — ASSESSMENT & PLAN NOTE
- vague complaints on admit with elevated T bili  - history of elevated T bili, suspect from Thalassemia trait  - RUQ US showed known right adrenal gland lesion and small right renal cyst and mild hepatomegaly.

## 2017-12-20 NOTE — HPI
Hafsa Hawley is a 52 y.o. Female  with HFrEF s/p ICD, HTN, AFib, MELISSA, who presented to the ED with SOB. Patient reports SOB chronic with recent progressive worsening over the past 3 weeks. At that time she was diagnosed with a URI with productive cough, chest tightness and congestion, myalgias , dyspnea on exertion, weakness, edema, chest pain and dizziness. Reports that her persistent cough was evaluated by her cardiologist (Dr Cortez) who discontinued her losartan without relief.  but continues to have a cough. Patient denies dizziness, palpitations, orthopnea, PND. Reports compliance with low sodium diet and medications. She is presently without any distress in the ED and reports her SOB has improved. Chest congestion persists.

## 2017-12-20 NOTE — ED NOTES
Pt complaining of back pain, MD notified. Pt also to remain on Droplet Precautions for possible influenza.

## 2017-12-20 NOTE — H&P
[facility] Medicine  History & Physical    Patient Name: Hafsa Hawley  MRN: [id number]  Admission Date: 12/19/2017  Attending Physician: Burke Shah MD   Primary Care Provider: Haylie Lion MD         Patient information was obtained from patient and ER records.     Subjective:     Principal Problem:Shortness of breath    Chief Complaint:   Chief Complaint   Patient presents with    Shortness of Breath     x 3 weeks dx with uri, complains of body aches     Dizziness     x 2-3 days, fall x2 in 2 days         HPI: 52 y.o. F with HFrEF (EF 40) with diastolic dysfunction s/p pacemaker, CAD, HTN, AFib, MELISSA, comes in complaining of SOB. Patient is teary eyed and gives a poor history. Reports that she has shortness of breath all the time due to her COPD and HF. Denies using home oxygen. Does not remember a point when she was not SOB. However reports that 3 weeks ago she was diagnosed with a URI with productive cough, chest tightness and congestion, myalgias , dyspnea on exertion, weakness, and dizziness. Reports that her persistent cough was evaluated by her cardiologist who discontinued her losartan but continues to have a cough.    On chart review, patient was seen earlier this week with similar complaints and the ER was concerned for possible undiagnosed depression.     Reports diagnosed with MELISSA several years ago however does not use the CPAP because it is uncomfortable. Currently lives at home with her son and her granddaughter. Reports continuing taking all of her medications as prescribed.    Past Medical History:   Diagnosis Date    Anemia     Anticoagulant long-term use     Arthritis     Atrial fibrillation     Congestive heart failure     COPD (chronic obstructive pulmonary disease)     Encounter for blood transfusion     GERD (gastroesophageal reflux disease)     Hypertension     Right kidney mass     Sleep apnea     Thyroid disease        Past  "Surgical History:   Procedure Laterality Date    APPENDECTOMY      CARDIAC DEFIBRILLATOR PLACEMENT Left 12/2016    CARDIAC DEFIBRILLATOR PLACEMENT  2016    EYE SURGERY      FRACTURE SURGERY Left     hand 5th digit    HYSTERECTOMY      left knee mass removal Left        Review of patient's allergies indicates:   Allergen Reactions    Penicillins Hives    Iodinated contrast- oral and iv dye Nausea And Vomiting    Percocet [oxycodone-acetaminophen] Other (See Comments)     Nausea, Dizziness, Anxiety.  "I don't like how it makes me feel."   Given Hydromorphone 0.5mg IVP  Without problems.    Diovan hct [valsartan-hydrochlorothiazide] Other (See Comments)     Causes coughing    Tramadol Nausea And Vomiting       No current facility-administered medications on file prior to encounter.      Current Outpatient Prescriptions on File Prior to Encounter   Medication Sig    albuterol-ipratropium 2.5mg-0.5mg/3mL (DUO-NEB) 0.5 mg-3 mg(2.5 mg base)/3 mL nebulizer solution Take 1 mL by nebulization 2 (two) times daily.    alprazolam (XANAX) 2 MG Tab Take 1 tablet by mouth as needed.    b complex vitamins tablet Take 1 tablet by mouth once daily.    benzonatate (TESSALON) 100 MG capsule Take 1 capsule (100 mg total) by mouth 3 (three) times daily as needed for Cough.    carvedilol (COREG) 25 MG tablet Take 1 tablet (25 mg total) by mouth 2 (two) times daily.    cetirizine (ZYRTEC) 10 MG tablet Take 1 tablet by mouth once daily.    cyanocobalamin, vitamin B-12, 50 mcg tablet Take 5 mcg by mouth once daily.    DIGOX 125 mcg tablet TAKE 1 TABLET BY MOUTH EVERY DAY    ELIQUIS 5 mg Tab TAKE 1 TABLET BY MOUTH TWICE DAILY    fluticasone (FLONASE) 50 mcg/actuation nasal spray SHAKE WELL AND U 1 SPR IEN QD    furosemide (LASIX) 80 MG tablet Take 1 tablet (80 mg total) by mouth 2 (two) times daily.    gabapentin (NEURONTIN) 100 MG capsule Take 1 capsule by mouth every evening.    guaifenesin 100 mg/5 ml (ROBITUSSIN) " 100 mg/5 mL syrup Take 200 mg by mouth 3 (three) times daily as needed for Cough.    hydrALAZINE (APRESOLINE) 25 MG tablet TAKE 1 TABLET BY MOUTH TWICE DAILY    hydrocodone-acetaminophen 10-325mg (NORCO)  mg Tab TK 1 T PO Q 12 H PRN P    ipratropium-albuterol (COMBIVENT RESPIMAT)  mcg/actuation inhaler Inhale 1 puff into the lungs 2 (two) times daily. Rescue    isosorbide dinitrate (ISORDIL) 20 MG tablet TAKE 1 TABLET BY MOUTH TWICE DAILY    lidocaine HCL 2% (XYLOCAINE) 2 % jelly Apply topically as needed.    methylPREDNISolone (MEDROL DOSEPACK) 4 mg tablet Take as directed on the packet.    NITROSTAT 0.4 mg SL tablet Take 1 mg by mouth as needed.    pantoprazole (PROTONIX) 40 MG tablet Take 1 tablet by mouth once daily.    potassium chloride (KLOR-CON) 10 MEQ TbSR Take 1 tablet by mouth once daily.    PULMICORT FLEXHALER 90 mcg/actuation AePB Inhale 1 puff (90 mcg total) into the lungs 2 (two) times daily.    SPIRIVA WITH HANDIHALER 18 mcg inhalation capsule Inhale 1 capsule (18 mcg total) into the lungs once daily. Controller    VENTOLIN HFA 90 mcg/actuation inhaler Inhale 2 puffs into the lungs 2 (two) times daily.     Family History     None        Social History Main Topics    Smoking status: Never Smoker    Smokeless tobacco: Never Used    Alcohol use Yes      Comment: occaisionally    Drug use: No    Sexual activity: Yes     Partners: Male     Review of Systems   Constitutional: Negative for chills, fatigue, fever and unexpected weight change.   HENT: Negative for sore throat.    Eyes: Negative for visual disturbance.   Respiratory: Negative for cough, chest tightness and shortness of breath.    Cardiovascular: Negative for chest pain.   Gastrointestinal: Negative for abdominal pain, constipation, diarrhea, nausea and vomiting.   Endocrine: Negative for polyuria.   Genitourinary: Negative for dysuria and hematuria.   Neurological: Negative for headaches.     Objective:     Vital  Signs (Most Recent):  Temp: 97.8 °F (36.6 °C) (12/19/17 1800)  Pulse: 94 (12/19/17 2219)  Resp: 20 (12/19/17 2219)  BP: (!) 171/83 (12/19/17 2219)  SpO2: 99 % (12/19/17 2219) Vital Signs (24h Range):  Temp:  [97.8 °F (36.6 °C)] 97.8 °F (36.6 °C)  Pulse:  [] 94  Resp:  [18-20] 20  SpO2:  [96 %-100 %] 99 %  BP: (171-186)/() 171/83     Weight: 93.9 kg (207 lb)  Body mass index is 33.41 kg/m².    Physical Exam   Constitutional: She is oriented to person, place, and time. She appears well-developed and well-nourished.   Tearful   HENT:   Head: Normocephalic and atraumatic.   Right Ear: External ear normal.   Left Ear: External ear normal.   Nose: Nose normal.   Mouth/Throat: Oropharynx is clear and moist. No oropharyngeal exudate.   Eyes: Conjunctivae and EOM are normal. Pupils are equal, round, and reactive to light. Right eye exhibits no discharge. Left eye exhibits no discharge. No scleral icterus.   Neck: Normal range of motion. Neck supple. No thyromegaly present.   Cardiovascular: Normal rate.  Exam reveals no gallop and no friction rub.    No murmur heard.  Tachycardic   Pulmonary/Chest: No respiratory distress. She has no wheezes. She has no rales. She exhibits no tenderness.   Decreased breath sounds bilaterally   Abdominal: Soft. Bowel sounds are normal. She exhibits no distension and no mass. There is no rebound and no guarding. No hernia.   Musculoskeletal: Normal range of motion. She exhibits no edema or tenderness.   Lymphadenopathy:     She has no cervical adenopathy.   Neurological: She is alert and oriented to person, place, and time.   Skin: Skin is warm. She is not diaphoretic.   Psychiatric: She has a normal mood and affect. Her behavior is normal. Judgment and thought content normal.   Nursing note and vitals reviewed.        CRANIAL NERVES     CN III, IV, VI   Pupils are equal, round, and reactive to light.  Extraocular motions are normal.        Significant Labs:   CBC:   Recent  Labs  Lab 12/19/17 2126   WBC 7.22   HGB 10.1*   HCT 33.4*        CMP:   Recent Labs  Lab 12/19/17 2043 12/19/17 2126    143   K 4.3 3.9   * 110   CO2 19* 23    97   BUN 24* 25*   CREATININE 1.1 1.1   CALCIUM 8.7 8.7   PROT 6.3 6.1   ALBUMIN 2.9* 2.9*   BILITOT 3.0* 3.0*   ALKPHOS 56 57   AST 30 27   ALT 34 34   ANIONGAP 12 10   EGFRNONAA 58* 58*     Cardiac Markers:   Recent Labs  Lab 12/19/17 2126   BNP 2,547*     Respiratory Culture: No results for input(s): GSRESP, RESPIRATORYC in the last 48 hours.    Significant Imaging: CXR: I have reviewed all pertinent results/findings within the past 24 hours and my personal findings are:  Possible heart failure  Echo: I have reviewed all pertinent results/findings within the past 24 hours and my personal findings are:  EF 40 with DD  EKG: I have reviewed all pertinent results/findings within the past 24 hours and my personal findings are: No evidence of concerning ST changes    Assessment/Plan:     * Shortness of breath    - previous hx of similar symptoms in 9/2017 with symptoms that improved on prednisone. Had concerns for stress or mold  - likely multifactorial  - started treatment for both acute HF and COPD  - patient has history of baseline anemia  - suspect depression/anxiety component also  - ABG pending        Abdominal pain    - vague complaints on admit with elevated T bili  - history of elevated T bili, suspect from Thal trait  - RUQ US pending          Acute heart failure    - on admit, elevated BNP 2547 with findings suggestive of acute HF on CXR  - no edema or JVD, without appreciable crackles on lung exam  - 2D echo EF 40 with DD (9/2017) s/p ICD placement  - on home isosorbide dinitrate, hydralazine, carvedilol  - holding home lasix  - lasix IV 80 mg BID  - strict I/O, daily weights          COPD (chronic obstructive pulmonary disease)    - decreased breath sounds on admit  - on solumedrol 125 mg q8h, duo-nebs q4  -  azithromycin IV  - plan transition to PO meds once tachypnea resolves  - may require outpatient PFT if patient has not had one recently        Paroxysmal atrial fibrillation    - AFib not in RVR  - rate controlled on digoxin and carvedilol  - on eliquis        MELISSA (obstructive sleep apnea)    - suspect possible longstanding uncontrolled MELISSA that may contribute to patient's long standing symptoms  - started back on CPAP          Essential hypertension    - see acute heart failure medications          Anemia    - previous electrophoresis found likely thal trait  - Baseline H/H 10/33        Elevated troponin    - c/o CP/tightness on admit with mildly elevated trop I without concerning findings on EKG  - suspect NSTEMI II (demand ischemia) in setting on eliquis  - trending EKG/Trop I          VTE Risk Mitigation         Ordered     apixaban tablet 5 mg  2 times daily     Route:  Oral        12/19/17 9286             Cristobal Beard MD  Department of Hospital Medicine   [facility]

## 2017-12-20 NOTE — ED NOTES
"Pt denies any nausea or chest pain at this time. Pt reports that she is "just trying to cough up something." pt is resting quietly  "

## 2017-12-20 NOTE — ED NOTES
APPEARANCE: Alert, oriented. Pt tearful.   CARDIAC: Normal rate and rhythm. S1S2 noted. Pt has a defibrillator placed at the left upper chest.   PERIPHERAL VASCULAR: peripheral pulses present. Normal cap refill. No edema. Warm to touch. 2+ radial pulses and 2+ pedal pulses. Patient has 2+ pitting edema to both legs bilaterally.  RESPIRATORY:Normal rate and effort at this time, breath sounds clear bilaterally throughout chest. Respirations are equal and unlabored no obvious signs of distress. Pt states she is short of breath.   MUSC: Full ROM. No bony tenderness or soft tissue tenderness. No obvious deformity.  SKIN: Skin is warm and dry, normal skin turgor, mucous membranes moist.  NEURO: 5/5 strength major flexors/extensors bilaterally. Sensory intact to light touch bilaterally. [place] coma scale: eyes open spontaneously-4, oriented & converses-5, obeys commands-6. No neurological abnormalities.   MENTAL STATUS: awake, alert and aware of environment.  EYE: No obvious discharge.  ENT: EARS: no obvious drainage. NOSE: no active bleeding.

## 2017-12-20 NOTE — ED NOTES
Resumed pt care, pt resting quietly in bed, cpap in us. Skin warm and dry. Pt easily aroused to verbal stimulation. Respirations even and unlabored. Equal rise and fall of chest noted. Skin warm and dry. Will continue to monitor.

## 2017-12-20 NOTE — ED NOTES
PT assisted to restroom via wheelchair, upon return to room pt requested to remain sitting in wheelchair, RN Anurag made aware and ok'd her to remain in wheelchair. Pt placed back on oxygen and cardaic monitoring, set up with bedside commode for her convenience.

## 2017-12-20 NOTE — SUBJECTIVE & OBJECTIVE
"Past Medical History:   Diagnosis Date    Anemia     Anticoagulant long-term use     Arthritis     Atrial fibrillation     Congestive heart failure     COPD (chronic obstructive pulmonary disease)     Encounter for blood transfusion     GERD (gastroesophageal reflux disease)     Hypertension     Right kidney mass     Sleep apnea     Thyroid disease        Past Surgical History:   Procedure Laterality Date    APPENDECTOMY      CARDIAC DEFIBRILLATOR PLACEMENT Left 12/2016    CARDIAC DEFIBRILLATOR PLACEMENT  2016    EYE SURGERY      FRACTURE SURGERY Left     hand 5th digit    HYSTERECTOMY      left knee mass removal Left        Review of patient's allergies indicates:   Allergen Reactions    Penicillins Hives    Iodinated contrast- oral and iv dye Nausea And Vomiting    Percocet [oxycodone-acetaminophen] Other (See Comments)     Nausea, Dizziness, Anxiety.  "I don't like how it makes me feel."   Given Hydromorphone 0.5mg IVP  Without problems.    Diovan hct [valsartan-hydrochlorothiazide] Other (See Comments)     Causes coughing    Tramadol Nausea And Vomiting       No current facility-administered medications on file prior to encounter.      Current Outpatient Prescriptions on File Prior to Encounter   Medication Sig    albuterol-ipratropium 2.5mg-0.5mg/3mL (DUO-NEB) 0.5 mg-3 mg(2.5 mg base)/3 mL nebulizer solution Take 1 mL by nebulization 2 (two) times daily.    alprazolam (XANAX) 2 MG Tab Take 1 tablet by mouth as needed.    b complex vitamins tablet Take 1 tablet by mouth once daily.    benzonatate (TESSALON) 100 MG capsule Take 1 capsule (100 mg total) by mouth 3 (three) times daily as needed for Cough.    carvedilol (COREG) 25 MG tablet Take 1 tablet (25 mg total) by mouth 2 (two) times daily.    cetirizine (ZYRTEC) 10 MG tablet Take 1 tablet by mouth once daily.    cyanocobalamin, vitamin B-12, 50 mcg tablet Take 5 mcg by mouth once daily.    DIGOX 125 mcg tablet TAKE 1 TABLET BY " MOUTH EVERY DAY    ELIQUIS 5 mg Tab TAKE 1 TABLET BY MOUTH TWICE DAILY    fluticasone (FLONASE) 50 mcg/actuation nasal spray SHAKE WELL AND U 1 SPR IEN QD    furosemide (LASIX) 80 MG tablet Take 1 tablet (80 mg total) by mouth 2 (two) times daily.    gabapentin (NEURONTIN) 100 MG capsule Take 1 capsule by mouth every evening.    guaifenesin 100 mg/5 ml (ROBITUSSIN) 100 mg/5 mL syrup Take 200 mg by mouth 3 (three) times daily as needed for Cough.    hydrALAZINE (APRESOLINE) 25 MG tablet TAKE 1 TABLET BY MOUTH TWICE DAILY    hydrocodone-acetaminophen 10-325mg (NORCO)  mg Tab TK 1 T PO Q 12 H PRN P    ipratropium-albuterol (COMBIVENT RESPIMAT)  mcg/actuation inhaler Inhale 1 puff into the lungs 2 (two) times daily. Rescue    isosorbide dinitrate (ISORDIL) 20 MG tablet TAKE 1 TABLET BY MOUTH TWICE DAILY    lidocaine HCL 2% (XYLOCAINE) 2 % jelly Apply topically as needed.    methylPREDNISolone (MEDROL DOSEPACK) 4 mg tablet Take as directed on the packet.    NITROSTAT 0.4 mg SL tablet Take 1 mg by mouth as needed.    pantoprazole (PROTONIX) 40 MG tablet Take 1 tablet by mouth once daily.    potassium chloride (KLOR-CON) 10 MEQ TbSR Take 1 tablet by mouth once daily.    PULMICORT FLEXHALER 90 mcg/actuation AePB Inhale 1 puff (90 mcg total) into the lungs 2 (two) times daily.    SPIRIVA WITH HANDIHALER 18 mcg inhalation capsule Inhale 1 capsule (18 mcg total) into the lungs once daily. Controller    VENTOLIN HFA 90 mcg/actuation inhaler Inhale 2 puffs into the lungs 2 (two) times daily.     Family History     None        Social History Main Topics    Smoking status: Never Smoker    Smokeless tobacco: Never Used    Alcohol use Yes      Comment: occaisionally    Drug use: No    Sexual activity: Yes     Partners: Male     Review of Systems   Constitution: Positive for malaise/fatigue. Negative for diaphoresis, weight gain and weight loss.   HENT: Negative.    Eyes: Negative.    Cardiovascular:  Positive for dyspnea on exertion. Negative for chest pain, irregular heartbeat, leg swelling, near-syncope, orthopnea, palpitations, paroxysmal nocturnal dyspnea and syncope.   Respiratory: Positive for cough, shortness of breath, sleep disturbances due to breathing and sputum production. Negative for wheezing.    Endocrine: Negative.    Hematologic/Lymphatic: Negative.    Skin: Negative.    Musculoskeletal: Negative.    Gastrointestinal: Positive for bloating. Negative for abdominal pain, nausea and vomiting.   Genitourinary: Negative.    Neurological: Negative.    Psychiatric/Behavioral: Negative.    Allergic/Immunologic: Negative.      Objective:     Vital Signs (Most Recent):  Temp: 97.7 °F (36.5 °C) (12/20/17 1233)  Pulse: 80 (12/20/17 1250)  Resp: (!) 21 (12/20/17 1250)  BP: (!) 146/68 (12/20/17 1250)  SpO2: 97 % (12/20/17 1250) Vital Signs (24h Range):  Temp:  [97.7 °F (36.5 °C)-98.9 °F (37.2 °C)] 97.7 °F (36.5 °C)  Pulse:  [] 80  Resp:  [18-23] 21  SpO2:  [94 %-100 %] 97 %  BP: (110-186)/() 146/68     Weight: 93.9 kg (207 lb)  Body mass index is 33.41 kg/m².    SpO2: 97 %  O2 Device (Oxygen Therapy): nasal cannula      Intake/Output Summary (Last 24 hours) at 12/20/17 1327  Last data filed at 12/20/17 1304   Gross per 24 hour   Intake                0 ml   Output             1925 ml   Net            -1925 ml       Lines/Drains/Airways     Drain                 Urethral Catheter 12/20/17 0200 Non-latex 14 Fr. less than 1 day          Peripheral Intravenous Line                 Peripheral IV - Single Lumen 12/19/17 2049 Right;Posterior Hand less than 1 day                Physical Exam   Constitutional: She is oriented to person, place, and time. She appears well-developed and well-nourished. No distress.   HENT:   Head: Normocephalic and atraumatic.   Eyes: Right eye exhibits no discharge. Left eye exhibits no discharge.   Neck: No JVD present.   Cardiovascular: Normal rate, regular rhythm and  intact distal pulses.  PMI is displaced.    Murmur heard.  Pulmonary/Chest: Effort normal. No accessory muscle usage. No respiratory distress. She has decreased breath sounds.   Abdominal: Bowel sounds are normal. She exhibits distension.   Musculoskeletal: She exhibits no edema.   Neurological: She is alert and oriented to person, place, and time.   Skin: Skin is warm and dry. She is not diaphoretic.   Psychiatric: She has a normal mood and affect. Her behavior is normal. Judgment and thought content normal.       Significant Labs:   BMP:   Recent Labs  Lab 12/19/17 2043 12/19/17 2126 12/20/17 0317 12/20/17  0557    97  --  176*    143  --  140   K 4.3 3.9  --  3.8   * 110  --  106   CO2 19* 23  --  25   BUN 24* 25*  --  25*   CREATININE 1.1 1.1  --  1.0   CALCIUM 8.7 8.7  --  8.7   MG 1.7 1.8 1.6  --    , CMP   Recent Labs  Lab 12/19/17 2043 12/19/17 2126 12/20/17  0557    143 140   K 4.3 3.9 3.8   * 110 106   CO2 19* 23 25    97 176*   BUN 24* 25* 25*   CREATININE 1.1 1.1 1.0   CALCIUM 8.7 8.7 8.7   PROT 6.3 6.1 6.3   ALBUMIN 2.9* 2.9* 2.9*   BILITOT 3.0* 3.0* 3.0*   ALKPHOS 56 57 59   AST 30 27 25   ALT 34 34 35   ANIONGAP 12 10 9   ESTGFRAFRICA >60 >60 >60   EGFRNONAA 58* 58* >60   , CBC   Recent Labs  Lab 12/19/17 2126 12/20/17  1039   WBC 7.22 5.27   HGB 10.1* 9.8*   HCT 33.4* 33.4*    234   , INR No results for input(s): INR, PROTIME in the last 48 hours., Troponin   Recent Labs  Lab 12/20/17 0317 12/20/17  1039 12/20/17  1242   TROPONINI 0.776* 0.683* 0.661*    and All pertinent lab results from the last 24 hours have been reviewed.    Significant Imaging: Echocardiogram:   2D echo with color flow doppler:   Results for orders placed or performed during the hospital encounter of 12/19/17   2D echo with color flow doppler   Result Value Ref Range    EF 20 (A) 55 - 65    Mitral Valve Regurgitation MILD     Pericardial Effusion SMALL (A)

## 2017-12-20 NOTE — ASSESSMENT & PLAN NOTE
- NSR this AM  - Rate controlled on digoxin and carvedilol. Will continue  - AOC with eliquis  - TSH 1.503 (12/2017)

## 2017-12-20 NOTE — ASSESSMENT & PLAN NOTE
- c/o CP/tightness on admit with mildly elevated trop I without concerning findings on EKG  - suspect NSTEMI II (demand ischemia) in setting on eliquis  - trending EKG/Trop I

## 2017-12-20 NOTE — SUBJECTIVE & OBJECTIVE
Interval History: Patient says breathing has improved this AM. Denies smoking history but reports that she has a lot of second hand smoke exposure.    Review of Systems   Constitutional: Negative for chills, fatigue, fever and unexpected weight change.   HENT: Negative for sore throat.    Eyes: Negative for visual disturbance.   Respiratory: Negative for cough, chest tightness and shortness of breath.    Cardiovascular: Negative for chest pain.   Gastrointestinal: Positive for abdominal pain. Negative for constipation, diarrhea, nausea and vomiting.   Endocrine: Negative for polyuria.   Genitourinary: Negative for dysuria and hematuria.   Neurological: Negative for headaches.     Objective:     Vital Signs (Most Recent):  Temp: 98.9 °F (37.2 °C) (12/20/17 0350)  Pulse: 94 (12/20/17 0728)  Resp: 18 (12/20/17 0728)  BP: (!) 152/97 (12/20/17 0548)  SpO2: 100 % (12/20/17 0728) Vital Signs (24h Range):  Temp:  [97.8 °F (36.6 °C)-98.9 °F (37.2 °C)] 98.9 °F (37.2 °C)  Pulse:  [] 94  Resp:  [18-21] 18  SpO2:  [94 %-100 %] 100 %  BP: (139-186)/() 152/97     Weight: 93.9 kg (207 lb)  Body mass index is 33.41 kg/m².    Intake/Output Summary (Last 24 hours) at 12/20/17 0854  Last data filed at 12/20/17 0646   Gross per 24 hour   Intake                0 ml   Output             1075 ml   Net            -1075 ml      Physical Exam   Constitutional: She is oriented to person, place, and time. She appears well-developed and well-nourished.   HENT:   Head: Normocephalic and atraumatic.   Neck: Normal range of motion. Neck supple.   Cardiovascular: Normal rate, regular rhythm and intact distal pulses.    Pulmonary/Chest: No respiratory distress. She has no wheezes. She has no rales. She exhibits no tenderness.   Mildly diminished breath sounds bilaterally   Abdominal: Soft. Bowel sounds are normal. She exhibits no distension and no mass. There is tenderness (mild LUQ). There is no rebound and no guarding.    Musculoskeletal: Normal range of motion. She exhibits no edema or tenderness.   Neurological: She is alert and oriented to person, place, and time.   Skin: She is not diaphoretic.   Psychiatric: She has a normal mood and affect. Her behavior is normal. Judgment and thought content normal.       Significant Labs:   A1C:   Recent Labs  Lab 12/07/17  1401   HGBA1C 5.0     ABGs:   Recent Labs  Lab 12/20/17  0149   PH 7.431   PCO2 37.3   HCO3 24.8   POCSATURATED 94*   BE 1     Bilirubin:   Recent Labs  Lab 12/07/17  1401 12/13/17  1036 12/19/17 2043 12/19/17 2126 12/20/17  0557   BILITOT 2.2* 2.8* 3.0* 3.0* 3.0*     CBC:   Recent Labs  Lab 12/19/17 2126   WBC 7.22   HGB 10.1*   HCT 33.4*        CMP:   Recent Labs  Lab 12/19/17 2043 12/19/17 2126 12/20/17  0557    143 140   K 4.3 3.9 3.8   * 110 106   CO2 19* 23 25    97 176*   BUN 24* 25* 25*   CREATININE 1.1 1.1 1.0   CALCIUM 8.7 8.7 8.7   PROT 6.3 6.1 6.3   ALBUMIN 2.9* 2.9* 2.9*   BILITOT 3.0* 3.0* 3.0*   ALKPHOS 56 57 59   AST 30 27 25   ALT 34 34 35   ANIONGAP 12 10 9   EGFRNONAA 58* 58* >60     Cardiac Markers:   Recent Labs  Lab 12/19/17 2126   BNP 2,547*     Magnesium:   Recent Labs  Lab 12/19/17 2043 12/19/17 2126 12/20/17  0317   MG 1.7 1.8 1.6     Troponin:   Recent Labs  Lab 12/19/17 2043 12/19/17 2126 12/20/17  0317   TROPONINI 0.655* 0.684* 0.776*     TSH:   Recent Labs  Lab 12/07/17  1401   TSH 1.530     Significant Imaging: US Abdomen: 1.  Postoperative change of prior cholecystectomy. 2.  Hypoechoic lesion superior to the right kidney measuring 3.8 x 3.4 x 2.8 cm, presumably representing the patient's known right adrenal gland lesion, seen on multiple prior CT examinations.  3. Small right renal cyst and mild hepatomegaly.

## 2017-12-20 NOTE — ASSESSMENT & PLAN NOTE
LVEF 20-25% with severe LAE, normal RV function, small pericardial effusion, mild MR per Echo. Down from 40 % 3 mo ago but similar to echo 1 year ago.  NICM per [facility] in 01/2015 noting severe LVH, DD, and normal coronary arteries  Elevated BNP 2547, Tbili 3, TNI 0.095, 0.655, 0.684, 0.776, 0.683. ECG SR with LVH no acute ischemic changes; CXR with CHF pattern, pericardial effusion- small    Elevated TNI felt to be demand in setting of ADHF, acute illness, marked hypertension with SBP >180  Takes Lasix 80 mg BID at home  Agree with Lasix 80 IV TID  Accurate I&O, daily weights  Continue Coreg, Bidil, and resume ARB (cough persisted post discontinuation- felt to be unrelated)

## 2017-12-20 NOTE — PROGRESS NOTES
[facility] Hospital Medicine  Progress Note    Patient Name: Hafsa Hawley  MRN: [id number]  Patient Class: OP- Observation   Admission Date: 12/19/2017  Length of Stay: 0 days  Attending Physician: Burke Shah MD  Primary Care Provider: Haylie Lion MD        Subjective:     Principal Problem:Shortness of breath    HPI:  52 y.o. F with HFrEF (EF 40) with diastolic dysfunction s/p pacemaker, CAD, HTN, AFib, MELISSA, comes in complaining of SOB. Patient is teary eyed and gives a poor history. Reports that she has shortness of breath all the time due to her COPD and HF. Denies using home oxygen. Does not remember a point when she was not SOB. However reports that 3 weeks ago she was diagnosed with a URI with productive cough, chest tightness and congestion, myalgias , dyspnea on exertion, weakness, and dizziness. Reports that her persistent cough was evaluated by her cardiologist who discontinued her losartan but continues to have a cough.    On chart review, patient was seen earlier this week with similar complaints and the ER was concerned for possible undiagnosed depression.     Reports diagnosed with MELISSA several years ago however does not use the CPAP because it is uncomfortable. Currently lives at home with her son and her granddaughter. Reports continuing taking all of her medications as prescribed.    Hospital Course:  Patient was started on supplemental oxygen, given a dose of hydralazine for her elevated blood pressure    Interval History: Patient says breathing has improved this AM. Denies smoking history but reports that she has a lot of second hand smoke exposure.    Review of Systems   Constitutional: Negative for chills, fatigue, fever and unexpected weight change.   HENT: Negative for sore throat.    Eyes: Negative for visual disturbance.   Respiratory: Negative for cough, chest tightness and shortness of breath.    Cardiovascular: Negative for chest pain.    Gastrointestinal: Positive for abdominal pain. Negative for constipation, diarrhea, nausea and vomiting.   Endocrine: Negative for polyuria.   Genitourinary: Negative for dysuria and hematuria.   Neurological: Negative for headaches.     Objective:     Vital Signs (Most Recent):  Temp: 98.9 °F (37.2 °C) (12/20/17 0350)  Pulse: 94 (12/20/17 0728)  Resp: 18 (12/20/17 0728)  BP: (!) 152/97 (12/20/17 0548)  SpO2: 100 % (12/20/17 0728) Vital Signs (24h Range):  Temp:  [97.8 °F (36.6 °C)-98.9 °F (37.2 °C)] 98.9 °F (37.2 °C)  Pulse:  [] 94  Resp:  [18-21] 18  SpO2:  [94 %-100 %] 100 %  BP: (139-186)/() 152/97     Weight: 93.9 kg (207 lb)  Body mass index is 33.41 kg/m².    Intake/Output Summary (Last 24 hours) at 12/20/17 0854  Last data filed at 12/20/17 0646   Gross per 24 hour   Intake                0 ml   Output             1075 ml   Net            -1075 ml      Physical Exam   Constitutional: She is oriented to person, place, and time. She appears well-developed and well-nourished.   HENT:   Head: Normocephalic and atraumatic.   Neck: Normal range of motion. Neck supple.   Cardiovascular: Normal rate, regular rhythm and intact distal pulses.    Pulmonary/Chest: No respiratory distress. She has no wheezes. She has no rales. She exhibits no tenderness.   Mildly diminished breath sounds bilaterally   Abdominal: Soft. Bowel sounds are normal. She exhibits no distension and no mass. There is tenderness (mild LUQ). There is no rebound and no guarding.   Musculoskeletal: Normal range of motion. She exhibits no edema or tenderness.   Neurological: She is alert and oriented to person, place, and time.   Skin: She is not diaphoretic.   Psychiatric: She has a normal mood and affect. Her behavior is normal. Judgment and thought content normal.       Significant Labs:   A1C:   Recent Labs  Lab 12/07/17  1401   HGBA1C 5.0     ABGs:   Recent Labs  Lab 12/20/17  0149   PH 7.431   PCO2 37.3   HCO3 24.8   POCSATURATED  94*   BE 1     Bilirubin:   Recent Labs  Lab 12/07/17  1401 12/13/17  1036 12/19/17 2043 12/19/17 2126 12/20/17  0557   BILITOT 2.2* 2.8* 3.0* 3.0* 3.0*     CBC:   Recent Labs  Lab 12/19/17 2126   WBC 7.22   HGB 10.1*   HCT 33.4*        CMP:   Recent Labs  Lab 12/19/17 2043 12/19/17 2126 12/20/17  0557    143 140   K 4.3 3.9 3.8   * 110 106   CO2 19* 23 25    97 176*   BUN 24* 25* 25*   CREATININE 1.1 1.1 1.0   CALCIUM 8.7 8.7 8.7   PROT 6.3 6.1 6.3   ALBUMIN 2.9* 2.9* 2.9*   BILITOT 3.0* 3.0* 3.0*   ALKPHOS 56 57 59   AST 30 27 25   ALT 34 34 35   ANIONGAP 12 10 9   EGFRNONAA 58* 58* >60     Cardiac Markers:   Recent Labs  Lab 12/19/17 2126   BNP 2,547*     Magnesium:   Recent Labs  Lab 12/19/17 2043 12/19/17 2126 12/20/17  0317   MG 1.7 1.8 1.6     Troponin:   Recent Labs  Lab 12/19/17 2043 12/19/17 2126 12/20/17  0317   TROPONINI 0.655* 0.684* 0.776*     TSH:   Recent Labs  Lab 12/07/17  1401   TSH 1.530     Significant Imaging: US Abdomen: 1.  Postoperative change of prior cholecystectomy. 2.  Hypoechoic lesion superior to the right kidney measuring 3.8 x 3.4 x 2.8 cm, presumably representing the patient's known right adrenal gland lesion, seen on multiple prior CT examinations.  3. Small right renal cyst and mild hepatomegaly.      Assessment/Plan:      * Shortness of breath    - Per chart check hx of similar symptoms in 9/2017 that improved on prednisone. Had concerns for stress or mold  - Likely multifactorial  - Treating for both acute HF and COPD (Lasix, Solumedrol, Moxifloxacin, Duonebs)  - patient has history of baseline anemia  - suspect depression/anxiety component also  - ABG grossly normal except for slight decrease in PO2  - 2D echo pending        Abdominal pain    - vague complaints on admit with elevated T bili  - history of elevated T bili, suspect from Thalassemia trait  - RUQ US showed known right adrenal gland lesion and small right renal cyst and mild  hepatomegaly.        Acute heart failure    - on admit, elevated BNP 2547 with findings suggestive of acute HF on CXR  - no edema or JVD, without appreciable crackles on lung exam  - 2D echo EF 40 with DD (9/2017) s/p ICD placement  - Restarted home isosorbide dinitrate, hydralazine, carvedilol  - lasix IV 80 mg BID  - strict I/O, daily weights. Net -1075 since admit  - 2D echo pending          COPD (chronic obstructive pulmonary disease)    - Denies smoking hx but reports excess second hand smoke  - on solumedrol 125 mg q8h, duo-nebs q4  - moxifloxacin IV  - plan to transition to PO meds  - may require outpatient PFT if patient has not had one recently        Paroxysmal atrial fibrillation    - NSR this AM  - Rate controlled on digoxin and carvedilol. Will continue  - AOC with eliquis  - TSH 1.503 (12/2017)        MELISSA (obstructive sleep apnea)    - Possible longstanding uncontrolled MELISSA that may contribute to patient's long standing symptoms  - CPAP QHS          Essential hypertension    - see acute heart failure medications  - -170s/90-100s  - Restarting home meds          Anemia    - Previous electrophoresis suggest likely thalassemia trait  - Baseline H/H 10/33. MCV 52 at baseline. Patient at baseline on admit        Elevated troponin    - Mildly elevated trop on admit. Trop have trended up. May be 2/2 demand ischemia however will continue to trend. No concerning findings on EKG  - Will continue to trend trop            VTE Risk Mitigation         Ordered     apixaban tablet 5 mg  2 times daily     Route:  Oral        12/19/17 4991        Dispo: F/U 2D echo, I/Os, resp status, trop      Peri Lane MD  Department of Hospital Medicine   [facility]

## 2017-12-20 NOTE — ED NOTES
Pt to ED with complaints of shortness of breath. States she has been feeling this way for a long time, and it has kept her from sleeping. States that she would wake up gasping for air, and that she is scared of this condition. Pt tearful at this time. States she has also fallen yesterday and the day before. States it happened this morning when she woke up and became very dizzy when standing. Pt denies chest pain, but there is tenderness to the left chest where she states she has a keloid from defibrillator placement at the upper left chest.

## 2017-12-20 NOTE — ASSESSMENT & PLAN NOTE
- Possible longstanding uncontrolled MELISSA that may contribute to patient's long standing symptoms  - CPAP QHS

## 2017-12-20 NOTE — ASSESSMENT & PLAN NOTE
- Previous electrophoresis suggest likely thalassemia trait  - Baseline H/H 10/33. MCV 52 at baseline. Patient at baseline on admit

## 2017-12-20 NOTE — ASSESSMENT & PLAN NOTE
- vague complaints on admit with elevated T bili  - history of elevated T bili, suspect from Thal trait  - RUQ US pending

## 2017-12-20 NOTE — HOSPITAL COURSE
Patient was started on supplemental oxygen, given a dose of hydralazine for her elevated blood pressure

## 2017-12-20 NOTE — SUBJECTIVE & OBJECTIVE
"Past Medical History:   Diagnosis Date    Anemia     Anticoagulant long-term use     Arthritis     Atrial fibrillation     Congestive heart failure     COPD (chronic obstructive pulmonary disease)     Encounter for blood transfusion     GERD (gastroesophageal reflux disease)     Hypertension     Right kidney mass     Sleep apnea     Thyroid disease        Past Surgical History:   Procedure Laterality Date    APPENDECTOMY      CARDIAC DEFIBRILLATOR PLACEMENT Left 12/2016    CARDIAC DEFIBRILLATOR PLACEMENT  2016    EYE SURGERY      FRACTURE SURGERY Left     hand 5th digit    HYSTERECTOMY      left knee mass removal Left        Review of patient's allergies indicates:   Allergen Reactions    Penicillins Hives    Iodinated contrast- oral and iv dye Nausea And Vomiting    Percocet [oxycodone-acetaminophen] Other (See Comments)     Nausea, Dizziness, Anxiety.  "I don't like how it makes me feel."   Given Hydromorphone 0.5mg IVP  Without problems.    Diovan hct [valsartan-hydrochlorothiazide] Other (See Comments)     Causes coughing    Tramadol Nausea And Vomiting       No current facility-administered medications on file prior to encounter.      Current Outpatient Prescriptions on File Prior to Encounter   Medication Sig    albuterol-ipratropium 2.5mg-0.5mg/3mL (DUO-NEB) 0.5 mg-3 mg(2.5 mg base)/3 mL nebulizer solution Take 1 mL by nebulization 2 (two) times daily.    alprazolam (XANAX) 2 MG Tab Take 1 tablet by mouth as needed.    b complex vitamins tablet Take 1 tablet by mouth once daily.    benzonatate (TESSALON) 100 MG capsule Take 1 capsule (100 mg total) by mouth 3 (three) times daily as needed for Cough.    carvedilol (COREG) 25 MG tablet Take 1 tablet (25 mg total) by mouth 2 (two) times daily.    cetirizine (ZYRTEC) 10 MG tablet Take 1 tablet by mouth once daily.    cyanocobalamin, vitamin B-12, 50 mcg tablet Take 5 mcg by mouth once daily.    DIGOX 125 mcg tablet TAKE 1 TABLET BY " MOUTH EVERY DAY    ELIQUIS 5 mg Tab TAKE 1 TABLET BY MOUTH TWICE DAILY    fluticasone (FLONASE) 50 mcg/actuation nasal spray SHAKE WELL AND U 1 SPR IEN QD    furosemide (LASIX) 80 MG tablet Take 1 tablet (80 mg total) by mouth 2 (two) times daily.    gabapentin (NEURONTIN) 100 MG capsule Take 1 capsule by mouth every evening.    guaifenesin 100 mg/5 ml (ROBITUSSIN) 100 mg/5 mL syrup Take 200 mg by mouth 3 (three) times daily as needed for Cough.    hydrALAZINE (APRESOLINE) 25 MG tablet TAKE 1 TABLET BY MOUTH TWICE DAILY    hydrocodone-acetaminophen 10-325mg (NORCO)  mg Tab TK 1 T PO Q 12 H PRN P    ipratropium-albuterol (COMBIVENT RESPIMAT)  mcg/actuation inhaler Inhale 1 puff into the lungs 2 (two) times daily. Rescue    isosorbide dinitrate (ISORDIL) 20 MG tablet TAKE 1 TABLET BY MOUTH TWICE DAILY    lidocaine HCL 2% (XYLOCAINE) 2 % jelly Apply topically as needed.    methylPREDNISolone (MEDROL DOSEPACK) 4 mg tablet Take as directed on the packet.    NITROSTAT 0.4 mg SL tablet Take 1 mg by mouth as needed.    pantoprazole (PROTONIX) 40 MG tablet Take 1 tablet by mouth once daily.    potassium chloride (KLOR-CON) 10 MEQ TbSR Take 1 tablet by mouth once daily.    PULMICORT FLEXHALER 90 mcg/actuation AePB Inhale 1 puff (90 mcg total) into the lungs 2 (two) times daily.    SPIRIVA WITH HANDIHALER 18 mcg inhalation capsule Inhale 1 capsule (18 mcg total) into the lungs once daily. Controller    VENTOLIN HFA 90 mcg/actuation inhaler Inhale 2 puffs into the lungs 2 (two) times daily.     Family History     None        Social History Main Topics    Smoking status: Never Smoker    Smokeless tobacco: Never Used    Alcohol use Yes      Comment: occaisionally    Drug use: No    Sexual activity: Yes     Partners: Male     Review of Systems   Constitutional: Negative for chills, fatigue, fever and unexpected weight change.   HENT: Negative for sore throat.    Eyes: Negative for visual  disturbance.   Respiratory: Negative for cough, chest tightness and shortness of breath.    Cardiovascular: Negative for chest pain.   Gastrointestinal: Negative for abdominal pain, constipation, diarrhea, nausea and vomiting.   Endocrine: Negative for polyuria.   Genitourinary: Negative for dysuria and hematuria.   Neurological: Negative for headaches.     Objective:     Vital Signs (Most Recent):  Temp: 97.8 °F (36.6 °C) (12/19/17 1800)  Pulse: 94 (12/19/17 2219)  Resp: 20 (12/19/17 2219)  BP: (!) 171/83 (12/19/17 2219)  SpO2: 99 % (12/19/17 2219) Vital Signs (24h Range):  Temp:  [97.8 °F (36.6 °C)] 97.8 °F (36.6 °C)  Pulse:  [] 94  Resp:  [18-20] 20  SpO2:  [96 %-100 %] 99 %  BP: (171-186)/() 171/83     Weight: 93.9 kg (207 lb)  Body mass index is 33.41 kg/m².    Physical Exam   Constitutional: She is oriented to person, place, and time. She appears well-developed and well-nourished.   Tearful   HENT:   Head: Normocephalic and atraumatic.   Right Ear: External ear normal.   Left Ear: External ear normal.   Nose: Nose normal.   Mouth/Throat: Oropharynx is clear and moist. No oropharyngeal exudate.   Eyes: Conjunctivae and EOM are normal. Pupils are equal, round, and reactive to light. Right eye exhibits no discharge. Left eye exhibits no discharge. No scleral icterus.   Neck: Normal range of motion. Neck supple. No thyromegaly present.   Cardiovascular: Normal rate.  Exam reveals no gallop and no friction rub.    No murmur heard.  Tachycardic   Pulmonary/Chest: No respiratory distress. She has no wheezes. She has no rales. She exhibits no tenderness.   Decreased breath sounds bilaterally   Abdominal: Soft. Bowel sounds are normal. She exhibits no distension and no mass. There is no rebound and no guarding. No hernia.   Musculoskeletal: Normal range of motion. She exhibits no edema or tenderness.   Lymphadenopathy:     She has no cervical adenopathy.   Neurological: She is alert and oriented to person,  place, and time.   Skin: Skin is warm. She is not diaphoretic.   Psychiatric: She has a normal mood and affect. Her behavior is normal. Judgment and thought content normal.   Nursing note and vitals reviewed.        CRANIAL NERVES     CN III, IV, VI   Pupils are equal, round, and reactive to light.  Extraocular motions are normal.        Significant Labs:   CBC:   Recent Labs  Lab 12/19/17 2126   WBC 7.22   HGB 10.1*   HCT 33.4*        CMP:   Recent Labs  Lab 12/19/17 2043 12/19/17 2126    143   K 4.3 3.9   * 110   CO2 19* 23    97   BUN 24* 25*   CREATININE 1.1 1.1   CALCIUM 8.7 8.7   PROT 6.3 6.1   ALBUMIN 2.9* 2.9*   BILITOT 3.0* 3.0*   ALKPHOS 56 57   AST 30 27   ALT 34 34   ANIONGAP 12 10   EGFRNONAA 58* 58*     Cardiac Markers:   Recent Labs  Lab 12/19/17 2126   BNP 2,547*     Respiratory Culture: No results for input(s): GSRESP, RESPIRATORYC in the last 48 hours.    Significant Imaging: CXR: I have reviewed all pertinent results/findings within the past 24 hours and my personal findings are:  Possible heart failure  Echo: I have reviewed all pertinent results/findings within the past 24 hours and my personal findings are:  EF 40 with DD  EKG: I have reviewed all pertinent results/findings within the past 24 hours and my personal findings are: No evidence of concerning ST changes

## 2017-12-20 NOTE — ASSESSMENT & PLAN NOTE
- Per chart check hx of similar symptoms in 9/2017 that improved on prednisone. Had concerns for stress or mold  - Likely multifactorial  - Treating for both acute HF and COPD (Lasix, Solumedrol, Moxifloxacin, Duonebs)  - patient has history of baseline anemia  - suspect depression/anxiety component also  - ABG grossly normal except for slight decrease in PO2  - 2D echo pending

## 2017-12-20 NOTE — ED NOTES
Pt appears to be resting . Respirations even and unlabored. Equal rise and fall of chest noted. Skin warm and dry. Pt easily aroused to verbal stimulation. Will continue to monitor. Patient on cardiac monitor, automatic blood pressure cuff and pulse oximeter. Bed in low locked position with side rails up x 2.

## 2017-12-20 NOTE — ASSESSMENT & PLAN NOTE
- decreased breath sounds on admit  - on solumedrol 125 mg q8h, duo-nebs q4  - azithromycin IV  - plan transition to PO meds once tachypnea resolves  - may require outpatient PFT if patient has not had one recently

## 2017-12-20 NOTE — ASSESSMENT & PLAN NOTE
- suspect possible longstanding uncontrolled MELISSA that may contribute to patient's long standing symptoms  - started back on CPAP

## 2017-12-21 PROBLEM — I50.9 CONGESTIVE HEART FAILURE: Status: ACTIVE | Noted: 2017-12-21

## 2017-12-21 LAB
ALBUMIN SERPL BCP-MCNC: 2.9 G/DL
ALP SERPL-CCNC: 51 U/L
ALT SERPL W/O P-5'-P-CCNC: 29 U/L
ANION GAP SERPL CALC-SCNC: 10 MMOL/L
ANISOCYTOSIS BLD QL SMEAR: ABNORMAL
AST SERPL-CCNC: 17 U/L
BASOPHILS # BLD AUTO: ABNORMAL K/UL
BASOPHILS NFR BLD: 0 %
BILIRUB SERPL-MCNC: 2.4 MG/DL
BUN SERPL-MCNC: 27 MG/DL
BURR CELLS BLD QL SMEAR: ABNORMAL
CALCIUM SERPL-MCNC: 8.7 MG/DL
CHLORIDE SERPL-SCNC: 102 MMOL/L
CO2 SERPL-SCNC: 29 MMOL/L
CREAT SERPL-MCNC: 1 MG/DL
DACRYOCYTES BLD QL SMEAR: ABNORMAL
DIFFERENTIAL METHOD: ABNORMAL
EOSINOPHIL # BLD AUTO: ABNORMAL K/UL
EOSINOPHIL NFR BLD: 0 %
ERYTHROCYTE [DISTWIDTH] IN BLOOD BY AUTOMATED COUNT: 27.7 %
EST. GFR  (AFRICAN AMERICAN): >60 ML/MIN/1.73 M^2
EST. GFR  (NON AFRICAN AMERICAN): >60 ML/MIN/1.73 M^2
GLUCOSE SERPL-MCNC: 109 MG/DL
HCT VFR BLD AUTO: 32.7 %
HGB BLD-MCNC: 9.6 G/DL
HYPOCHROMIA BLD QL SMEAR: ABNORMAL
INR PPP: 1
LYMPHOCYTES # BLD AUTO: ABNORMAL K/UL
LYMPHOCYTES NFR BLD: 14 %
MAGNESIUM SERPL-MCNC: 1.8 MG/DL
MCH RBC QN AUTO: 16.6 PG
MCHC RBC AUTO-ENTMCNC: 29.4 G/DL
MCV RBC AUTO: 57 FL
MONOCYTES # BLD AUTO: ABNORMAL K/UL
MONOCYTES NFR BLD: 3 %
NEUTROPHILS NFR BLD: 82 %
NEUTS BAND NFR BLD MANUAL: 1 %
NRBC BLD-RTO: ABNORMAL /100 WBC
OVALOCYTES BLD QL SMEAR: ABNORMAL
PHOSPHATE SERPL-MCNC: 4.7 MG/DL
PLATELET # BLD AUTO: 260 K/UL
PLATELET BLD QL SMEAR: ABNORMAL
PMV BLD AUTO: ABNORMAL FL
POCT GLUCOSE: 106 MG/DL (ref 70–110)
POCT GLUCOSE: 107 MG/DL (ref 70–110)
POCT GLUCOSE: 122 MG/DL (ref 70–110)
POCT GLUCOSE: 140 MG/DL (ref 70–110)
POCT GLUCOSE: 251 MG/DL (ref 70–110)
POIKILOCYTOSIS BLD QL SMEAR: ABNORMAL
POLYCHROMASIA BLD QL SMEAR: ABNORMAL
POTASSIUM SERPL-SCNC: 3.6 MMOL/L
PROT SERPL-MCNC: 6.1 G/DL
PROTHROMBIN TIME: 10.4 SEC
RBC # BLD AUTO: 5.77 M/UL
SCHISTOCYTES BLD QL SMEAR: PRESENT
SODIUM SERPL-SCNC: 141 MMOL/L
SPHEROCYTES BLD QL SMEAR: ABNORMAL
TARGETS BLD QL SMEAR: ABNORMAL
TROPONIN I SERPL DL<=0.01 NG/ML-MCNC: 0.52 NG/ML
TROPONIN I SERPL DL<=0.01 NG/ML-MCNC: 0.52 NG/ML
TROPONIN I SERPL DL<=0.01 NG/ML-MCNC: 0.53 NG/ML
TROPONIN I SERPL DL<=0.01 NG/ML-MCNC: 0.56 NG/ML
WBC # BLD AUTO: 9.04 K/UL

## 2017-12-21 PROCEDURE — 85007 BL SMEAR W/DIFF WBC COUNT: CPT

## 2017-12-21 PROCEDURE — G8980 MOBILITY D/C STATUS: HCPCS | Mod: CH

## 2017-12-21 PROCEDURE — 99900035 HC TECH TIME PER 15 MIN (STAT)

## 2017-12-21 PROCEDURE — 94640 AIRWAY INHALATION TREATMENT: CPT

## 2017-12-21 PROCEDURE — 83735 ASSAY OF MAGNESIUM: CPT

## 2017-12-21 PROCEDURE — 84484 ASSAY OF TROPONIN QUANT: CPT | Mod: 91

## 2017-12-21 PROCEDURE — 63600175 PHARM REV CODE 636 W HCPCS: Performed by: FAMILY MEDICINE

## 2017-12-21 PROCEDURE — 94660 CPAP INITIATION&MGMT: CPT

## 2017-12-21 PROCEDURE — 25000003 PHARM REV CODE 250

## 2017-12-21 PROCEDURE — 85610 PROTHROMBIN TIME: CPT

## 2017-12-21 PROCEDURE — G8989 SELF CARE D/C STATUS: HCPCS | Mod: CI

## 2017-12-21 PROCEDURE — G8987 SELF CARE CURRENT STATUS: HCPCS | Mod: CI

## 2017-12-21 PROCEDURE — G8978 MOBILITY CURRENT STATUS: HCPCS | Mod: CH

## 2017-12-21 PROCEDURE — 11000001 HC ACUTE MED/SURG PRIVATE ROOM

## 2017-12-21 PROCEDURE — 25000003 PHARM REV CODE 250: Performed by: NURSE PRACTITIONER

## 2017-12-21 PROCEDURE — 97161 PT EVAL LOW COMPLEX 20 MIN: CPT

## 2017-12-21 PROCEDURE — 84145 PROCALCITONIN (PCT): CPT

## 2017-12-21 PROCEDURE — 25000242 PHARM REV CODE 250 ALT 637 W/ HCPCS

## 2017-12-21 PROCEDURE — 25000003 PHARM REV CODE 250: Performed by: FAMILY MEDICINE

## 2017-12-21 PROCEDURE — 80053 COMPREHEN METABOLIC PANEL: CPT

## 2017-12-21 PROCEDURE — 94761 N-INVAS EAR/PLS OXIMETRY MLT: CPT

## 2017-12-21 PROCEDURE — 97535 SELF CARE MNGMENT TRAINING: CPT

## 2017-12-21 PROCEDURE — 99233 SBSQ HOSP IP/OBS HIGH 50: CPT | Mod: ,,, | Performed by: INTERNAL MEDICINE

## 2017-12-21 PROCEDURE — G8988 SELF CARE GOAL STATUS: HCPCS | Mod: CI

## 2017-12-21 PROCEDURE — 97165 OT EVAL LOW COMPLEX 30 MIN: CPT

## 2017-12-21 PROCEDURE — 85027 COMPLETE CBC AUTOMATED: CPT

## 2017-12-21 PROCEDURE — 27000221 HC OXYGEN, UP TO 24 HOURS

## 2017-12-21 PROCEDURE — 36415 COLL VENOUS BLD VENIPUNCTURE: CPT

## 2017-12-21 PROCEDURE — 84100 ASSAY OF PHOSPHORUS: CPT

## 2017-12-21 PROCEDURE — G8979 MOBILITY GOAL STATUS: HCPCS | Mod: CH

## 2017-12-21 RX ORDER — ACETAMINOPHEN 325 MG/1
650 TABLET ORAL EVERY 6 HOURS PRN
Status: DISCONTINUED | OUTPATIENT
Start: 2017-12-21 | End: 2017-12-22 | Stop reason: HOSPADM

## 2017-12-21 RX ORDER — FUROSEMIDE 10 MG/ML
40 INJECTION INTRAMUSCULAR; INTRAVENOUS 2 TIMES DAILY
Status: DISCONTINUED | OUTPATIENT
Start: 2017-12-21 | End: 2017-12-22 | Stop reason: HOSPADM

## 2017-12-21 RX ADMIN — OSELTAMIVIR PHOSPHATE 75 MG: 75 CAPSULE ORAL at 09:12

## 2017-12-21 RX ADMIN — IPRATROPIUM BROMIDE AND ALBUTEROL SULFATE 3 ML: .5; 3 SOLUTION RESPIRATORY (INHALATION) at 11:12

## 2017-12-21 RX ADMIN — IPRATROPIUM BROMIDE AND ALBUTEROL SULFATE 3 ML: .5; 3 SOLUTION RESPIRATORY (INHALATION) at 08:12

## 2017-12-21 RX ADMIN — PANTOPRAZOLE SODIUM 40 MG: 40 TABLET, DELAYED RELEASE ORAL at 09:12

## 2017-12-21 RX ADMIN — APIXABAN 5 MG: 5 TABLET, FILM COATED ORAL at 09:12

## 2017-12-21 RX ADMIN — ACETAMINOPHEN 650 MG: 325 TABLET ORAL at 09:12

## 2017-12-21 RX ADMIN — CARVEDILOL 25 MG: 25 TABLET, FILM COATED ORAL at 09:12

## 2017-12-21 RX ADMIN — CARVEDILOL 25 MG: 25 TABLET, FILM COATED ORAL at 08:12

## 2017-12-21 RX ADMIN — ISOSORBIDE DINITRATE 20 MG: 20 TABLET ORAL at 08:12

## 2017-12-21 RX ADMIN — FUROSEMIDE 40 MG: 10 INJECTION, SOLUTION INTRAMUSCULAR; INTRAVENOUS at 06:12

## 2017-12-21 RX ADMIN — IPRATROPIUM BROMIDE AND ALBUTEROL SULFATE 3 ML: .5; 3 SOLUTION RESPIRATORY (INHALATION) at 04:12

## 2017-12-21 RX ADMIN — HYDRALAZINE HYDROCHLORIDE 25 MG: 25 TABLET, FILM COATED ORAL at 08:12

## 2017-12-21 RX ADMIN — MOXIFLOXACIN HYDROCHLORIDE 400 MG: 400 INJECTION, SOLUTION INTRAVENOUS at 10:12

## 2017-12-21 RX ADMIN — HYDRALAZINE HYDROCHLORIDE 25 MG: 25 TABLET, FILM COATED ORAL at 09:12

## 2017-12-21 RX ADMIN — ISOSORBIDE DINITRATE 20 MG: 20 TABLET ORAL at 09:12

## 2017-12-21 RX ADMIN — APIXABAN 5 MG: 5 TABLET, FILM COATED ORAL at 08:12

## 2017-12-21 RX ADMIN — IPRATROPIUM BROMIDE AND ALBUTEROL SULFATE 3 ML: .5; 3 SOLUTION RESPIRATORY (INHALATION) at 07:12

## 2017-12-21 RX ADMIN — IPRATROPIUM BROMIDE AND ALBUTEROL SULFATE 3 ML: .5; 3 SOLUTION RESPIRATORY (INHALATION) at 03:12

## 2017-12-21 RX ADMIN — BENZONATATE 100 MG: 100 CAPSULE ORAL at 10:12

## 2017-12-21 RX ADMIN — DIGOXIN 0.12 MG: 0.12 TABLET ORAL at 09:12

## 2017-12-21 RX ADMIN — BENZONATATE 100 MG: 100 CAPSULE ORAL at 05:12

## 2017-12-21 RX ADMIN — FUROSEMIDE 80 MG: 10 INJECTION, SOLUTION INTRAMUSCULAR; INTRAVENOUS at 05:12

## 2017-12-21 NOTE — PLAN OF CARE
Problem: Occupational Therapy Goal  Goal: Occupational Therapy Goal  Outcome: Outcome(s) achieved Date Met: 12/21/17  Initial OT eval/treat complete.  With c/o lower back/buttocks pain 8/10.  Pt. C/o feeling tired when standing or walking.  Sit<->supine with RW & Antonio.   Ambulated short household distance with RW Antonio.  Donned/doffed socks seated EOB.  Stand-pivot toilet T/F with RW Antonio.  Left seated in bedside chair with all needs in reach & nursing notified.  Pt. At prior level of function.  No acute OT needs at this time.  To benefit from TTB to increase safety & independence with tub T/F & bathing ADL.

## 2017-12-21 NOTE — PLAN OF CARE
Problem: Physical Therapy Goal  Goal: Physical Therapy Goal  Outcome: Outcome(s) achieved Date Met: 12/21/17  Pt with no skilled PT needs at this level of care.  Recommend ambulation in halls with mobility tech while still admitted.  Outpatient PT or cardiopulmonary recommended upon D/C.

## 2017-12-21 NOTE — PLAN OF CARE
Problem: Patient Care Overview  Goal: Plan of Care Review  Outcome: Ongoing (interventions implemented as appropriate)  Pt AAOX4. Denies pain at this time. No distress noted. Safety maintained. Bed in low, locked position. Bed alarm sound. Will continue to monitor.

## 2017-12-21 NOTE — ASSESSMENT & PLAN NOTE
-TNI [phone] with no acute EKG changes  -TNI chronically elevated but currently higher than baseline  -complaints of chest pain; will do stress test for ischemic evaluation; low likelihood of CAD given normal coronaries in 2015 but warranted with chest pain and elevated troponin

## 2017-12-21 NOTE — NURSING
Pt will not take Losartan Potassium as she states she stopped taking it a while back due to constant coughing.

## 2017-12-21 NOTE — PROGRESS NOTES
Progress Note  [facility]  Admit Date: 12/19/2017   LOS: 0 days   SUBJECTIVE:   Follow-up For: COPD exacerbation/CHF exacerbation    Patient seen and examined this AM. Says she much better this AM. Says SOB has greatly improved.     ROS  Denies N/V, abdominal pain, f/c    OBJECTIVE:   Vital Signs (Most Recent)  Temp: 98.2 °F (36.8 °C) (12/21/17 0736)  Pulse: (!) 58 (12/21/17 0736)  Resp: 18 (12/21/17 0736)  BP: (!) 140/72 (12/21/17 0736)  SpO2: 98 % (12/21/17 0723)    I & O (Last 24H):  Intake/Output Summary (Last 24 hours) at 12/21/17 0942  Last data filed at 12/21/17 0800   Gross per 24 hour   Intake              250 ml   Output             5050 ml   Net            -4800 ml     Wt Readings from Last 3 Encounters:   12/19/17 93.9 kg (207 lb)   12/13/17 94.3 kg (208 lb)   09/20/17 93.6 kg (206 lb 7 oz)       Current Diet Order   Procedures    Diet Cardiac        Physical Exam  Physical Exam  Physical Exam   General: AAOx3. NAD. NC in place  HEENT: NCAT.   CV: RRR. NL S1/S2. No murmurs  Chest: clear to auscultation bilaterally, normal effort  Abd: +BS x 4. Soft. ND/NT. No rebound or guarding.   Ext: peripheral pulses intact.  Neuro: No focal deficit.   Psych: Good judgement and reason. No abnormal behaviors noted    Laboratory Data:  CBC    Recent Labs  Lab 12/19/17 2126 12/20/17  1039 12/21/17  0613   WBC 7.22 5.27 9.04   RBC 5.87* 5.90* 5.77*   HGB 10.1* 9.8* 9.6*   HCT 33.4* 33.4* 32.7*    234 260   MCV 57* 57* 57*   MCH 17.2* 16.6* 16.6*   MCHC 30.2* 29.3* 29.4*     CMP    Recent Labs  Lab 12/19/17 2126 12/20/17  0557 12/21/17  0613   CALCIUM 8.7 8.7 8.7   PROT 6.1 6.3 6.1    140 141   K 3.9 3.8 3.6   CO2 23 25 29    106 102   BUN 25* 25* 27*   CREATININE 1.1 1.0 1.0   ALKPHOS 57 59 51*   ALT 34 35 29   AST 27 25 17   BILITOT 3.0* 3.0* 2.4*     POCT-Glucose  POCT Glucose   Date Value Ref Range Status   12/21/2017 106 70 - 110 mg/dL Final   12/20/2017 251 (H) 70 - 110 mg/dL Final        ASSESSMENT/PLAN:   Hafsa Hawley is a 52 y.o. female with PMHX of CHF, HTN with complaints of SOB admitted for CHF exacerbation.     Shortness of breath     -Per patient prior episodes 2/2 CHF. Also concerns for mold exposure  -Likely multifactorial. Pt with hx of anemia likely 2/2 Thalassemiaanemia. Baseline H/H  9.5/30  -Treating for both acute HF and COPD (Lasix, Solumedrol, Moxifloxacin, Duonebs)  -net -4940 ml since admit  -2D echo with EF 20        Abdominal pain     - vague complaints on admit with elevated T bili  - history of elevated T bili, suspect from Thalassemia trait  - RUQ US showed known right adrenal gland lesion and small right renal cyst and mild hepatomegaly.  -T.Bili trending down since admit. INR/PT pending         Acute heart failure     - on admit, elevated BNP 2547 with findings suggestive of acute HF on CXR  - no edema or JVD, without appreciable crackles on lung exam  - 2D echo EF 40 with DD (9/2017) s/p ICD placement  - Restarted home isosorbide dinitrate, hydralazine, carvedilol. D/c losartan  - lasix IV 80 mg BID  - strict I/O, daily weights. Net -4940 ml since admit  -2D echo with EF 20   -[facility] consulted with plans for stress test in AM          COPD (chronic obstructive pulmonary disease)     - Denies smoking hx but reports excess second hand smoke  - on solumedrol 125 mg q8h, duo-nebs q4, moxifloxacin IV  - plan to transition to PO meds  - may require outpatient PFT if patient has not had one recently       Paroxysmal atrial fibrillation     - NSR this AM  - Rate controlled on digoxin and carvedilol. Will continue  - AOC with eliquis  - TSH 1.503 (12/2017)       MELISSA (obstructive sleep apnea)     - Possible longstanding uncontrolled MELSISA that may contribute to patient's long standing symptoms  - CPAP QHS          Essential hypertension     - see acute heart failure medications  - -140s/60-70s  - Restarted home meds          Anemia     - Previous electrophoresis  (9/2017) suggest likely thalassemia trait  - Baseline H/H 9.5/30. MCV 52 at baseline.  -Patient at baseline this AM       Elevated troponin     - Elevated trop on admit. Trended down  - Seen by [facility] with plan for stress test in AM          PPX: SCD, LILIANA hose, protonix     Dispo: F/U I/Os, Stress test results in AM    12/21/2017 Peri Lane MD  9:42 AM

## 2017-12-21 NOTE — PROGRESS NOTES
.Pharmacy New Medication Education    Patient accepted medication education.    Pharmacy educated patient on name and purpose of medications and possible side effects, using the teach-back method.     D/C Current Inpatient Medication Orders   D/C albuterol-ipratropium 2.5mg-0.5mg/3mL nebulizer solution 3 mL   D/C apixaban tablet 5 mg   D/C benzonatate capsule 100 mg   D/C carvedilol tablet 25 mg   D/C dextrose 50% injection 12.5 g   D/C dextrose 50% injection 25 g   D/C digoxin tablet 0.125 mg   D/C furosemide injection 80 mg   D/C glucagon (human recombinant) injection 1 mg   D/C glucose chewable tablet 16 g   D/C glucose chewable tablet 24 g   D/C hydrALAZINE tablet 25 mg   D/C influenza (FLUZONE,FLUARIX QUADRIVALENT) vaccine 0.5 mL   D/C isosorbide dinitrate tablet 20 mg   D/C losartan tablet 25 mg   D/C moxifloxacin 400 mg/250 mL IVPB 400 mg   D/C nitroGLYCERIN SL tablet 0.4 mg   D/C ondansetron injection 4 mg   D/C oseltamivir capsule 75 mg   D/C pantoprazole EC tablet 40 mg   D/C promethazine (PHENERGAN) 6.25 mg in dextrose 5 % 50 mL IVPB   D/C sodium chloride 0.9% flush 5 mL       Learners of pharmacy medication education included:  Patient    Patient +/- learner response:  Verbalized Understanding, Teachback

## 2017-12-21 NOTE — PT/OT/SLP EVAL
Occupational Therapy   Evaluation and Discharge     Name: Hafsa Hawley  MRN: [id number]  Admitting Diagnosis:  Shortness of breath      Recommendations:     Discharge Recommendations: other (see comments) (Cardiopulmonary or OP OT.)  Discharge Equipment Recommendations:  bath bench  Barriers to discharge:       History:     Occupational Profile:  Living Environment: Lives with 11yr old granddaughter in SS house with threshold entry, tub/shower combo & standard toilet.  R-handed, drives short distances, performs light household chores.  Granddaughter assists with some cleaning.  Shares cooking responsibility with son.  Son visits often and works during the day.   Previous level of function: Geovani with ADL tasks.  Equipment Owned:  CPAP, nebulizer  Assistance upon Discharge:  Son works during day time hours and visits often.      Past Medical History:   Diagnosis Date    Anemia     Anticoagulant long-term use     Arthritis     Atrial fibrillation     Congestive heart failure     COPD (chronic obstructive pulmonary disease)     Encounter for blood transfusion     GERD (gastroesophageal reflux disease)     Hypertension     Right kidney mass     Sleep apnea     Thyroid disease        Past Surgical History:   Procedure Laterality Date    APPENDECTOMY      CARDIAC DEFIBRILLATOR PLACEMENT Left 12/2016    CARDIAC DEFIBRILLATOR PLACEMENT  2016    EYE SURGERY      FRACTURE SURGERY Left     hand 5th digit    HYSTERECTOMY      left knee mass removal Left        Subjective     Chief Complaint: Feeling tired when walking and bathing.  Patient/Family stated goals: Return home  Communicated with: Nursing prior to session.  Pain/Comfort:  · Pain Rating 1: 8/10  · Location - Orientation 1: generalized  · Location 1: back  · Pain Addressed 1: Reposition, Distraction, Cessation of Activity, Nurse notified  · Pain Rating Post-Intervention 1: 8/10  · Pain Rating 2: 8/10  · Location - Orientation 2:  generalized  · Location 2: other (see comments) (buttocks)  · Pain Addressed 2: Reposition, Distraction, Cessation of Activity, Nurse notified  · Pain Rating Post-Intervention 2: 8/10    Objective:     Patient found with: telemetry, saeed catheter    General Precautions: Standard, fall, contact, droplet   Orthopedic Precautions:N/A   Braces: N/A      Occupational Performance:    Bed Mobility:    · Patient completed Supine to Sit with modified independence    Functional Mobility/Transfers:  · Patient completed Sit <> Stand Transfer with modified independence  with  rolling walker   · Patient completed Bed <> Chair Transfer using Stand Pivot technique with modified independence with rolling walker  · Patient completed Toilet Transfer Stand Pivot technique with modified independence with  rolling walker    Activities of Daily Living:  · LB Dressing: modified independence and socks only seated EOB    Cognitive/Visual Perceptual:  Cognitive/Psychosocial Skills:     -       Oriented to: Person, Place, Time and Situation   -       Follows Commands/attention:Follows two-step commands  -       Communication: clear/fluent  -       Memory: No Deficits noted  -       Safety awareness/insight to disability: intact   -       Mood/Affect/Coping skills/emotional control: Appropriate to situation  Visual/Perceptual:      -Intact wears glasses    Physical Exam:  Postural examination/scapula alignment: -       Rounded shoulders  -       Forward head  Edema:  None noted  Sensation: -       Intact; Pt. Reports numbness/tingling in ulnar nerve distribution of R-hand  Dominant hand:    -       Ambidextrous  Upper Extremity Range of Motion:  -       Right Upper Extremity: WFL except wrist limited; Pt. attributed to previous RUE injury from fall  -       Left Upper Extremity: WFL  Upper Extremity Strength: -       Right Upper Extremity: WFL  -       Left Upper Extremity: WFL   Strength: -       Right Upper Extremity: WFL  -       Left  "Upper Extremity: WFL  Fine Motor Coordination: -       Intact  Gross motor coordination: WFL    Patient left up in chair with all lines intact, call button in reach and nursing notified    [facility] 6 Click:  [facility] Total Score: 23    Treatment & Education:  nitial OT eval/treat complete.  With c/o lower back/buttocks pain 8/10.  Pt. C/o feeling tired when standing or walking.  Sit<->supine with RW & Antonio.   Ambulated short household distance with RW Antonio.  Donned/doffed socks seated EOB with cross leg tech.  Stand-pivot toilet T/F with RW Antonio.  Stand-pivot RW->bedside chair Antonio.  Left seated in bedside chair with all needs in reach & nursing notified.  Pt. At prior level of function.  No acute OT needs at this time.  To benefit from TTB to increase safety & independence with tub T/F & bathing ADL.     Education:    Assessment:     Hafsa Hawley is a 52 y.o. female with a medical diagnosis of Shortness of breath.  She presents with below deficits.  Performance deficits affecting function are impaired cardiopulmonary response to activity.  Found to be at PLOF.  No acute OT needs at this time.    Rehab Prognosis:  Good; patient would benefit from acute skilled OT services to address these deficits and reach maximum level of function.         Clinical Decision Makin.  OT Low:  "Pt evaluation falls under low complexity for evaluation coding due to performance deficits noted in 1-3 areas as stated above and 0 co-morbities affecting current functional status. Data obtained from problem focused assessments. No modifications or assistance was required for completion of evaluation. Only brief occupational profile and history review completed."     Plan:     Patient to be seen   to address the above listed problems via    · Plan of Care Expires: 17  · Plan of Care Reviewed with: patient    This Plan of care has been discussed with the patient who was involved in its development and understands and is in " agreement with the identified goals and treatment plan    GOALS:    Occupational Therapy Goals        Problem: Occupational Therapy Goal    Goal Priority Disciplines Outcome Interventions   Occupational Therapy Goal     OT, PT/OT                     Time Tracking:     OT Date of Treatment: 12/21/17  OT Start Time: 1539  OT Stop Time: 1616  OT Total Time (min): 37 min    Billable Minutes:Evaluation 15  Self Care/Home Management 22    Cyn Teixeira OT  12/21/2017

## 2017-12-21 NOTE — ASSESSMENT & PLAN NOTE
-EKG with SR PACs   -recent AICD interrogation with no indication of mode switch  -continue BB, digoxin, and Eliquis

## 2017-12-21 NOTE — PLAN OF CARE
12/21/17 1732   Discharge Assessment   Assessment Type Discharge Planning Assessment   Confirmed/corrected address and phone number on facesheet? Yes  (Patient's physical address is [address] [place] [address] Dr. Roldan REAGAN [id number])   Assessment information obtained from? Patient   Expected Length of Stay (days) 3   Communicated expected length of stay with patient/caregiver yes   Prior to hospitilization cognitive status: Alert/Oriented   Prior to hospitalization functional status: Independent   Current cognitive status: Alert/Oriented   Current Functional Status: Independent   Facility Arrived From: (Home)   Lives With grandchild(ricky);child(ricky), adult  (patient's son and granddaughter lives with her.)   Able to Return to Prior Arrangements yes   Is patient able to care for self after discharge? Yes   Who are your caregiver(s) and their phone number(s)? (Sister, Jaye Baum [phone])   Patient's perception of discharge disposition home or selfcare   Readmission Within The Last 30 Days no previous admission in last 30 days   Patient currently being followed by outpatient case management? No   Patient currently receives any other outside agency services? No   Equipment Currently Used at Home none   Do you have any problems affording any of your prescribed medications? No   Is the patient taking medications as prescribed? yes   Does the patient have transportation home? Yes   Transportation Available family or friend will provide   Does the patient receive services at the [facility]? No   Discharge Plan A Home   Discharge Plan B Home with family   Patient/Family In Agreement With Plan yes

## 2017-12-21 NOTE — PROGRESS NOTES
[facility]  Cardiology  Progress Note    Patient Name: Hafsa Hawley  MRN: [id number]  Admission Date: 12/19/2017  Hospital Length of Stay: 0 days  Code Status: Full Code   Attending Physician: Burke Shah MD   Primary Care Physician: Haylie Lion MD  Expected Discharge Date:   Principal Problem:Shortness of breath    Subjective:     Hospital Course:   12/20/2017 Patient presented to the ED with progressively worsening SOB x 3 weeks. LVEF 20-25% with severe LAE, normal RV function, small pericardial effusion, mild MR per Echo. BNP elevated 2547, Tbili 3, TNI 0.095, 0.655, 0.684, 0.776, 0.683. ECG SR with LVH no acute ischemic changes. Influenza negative. Hypertensive with SBP >180- responded to hydralazine. Patient was given RT, steroids, abx, tamiflu, and IV Lasix in the ED. Responding well to treatment.    12/21/2017 Place on IV Lasix 80mg TID with good response and 4.2 liters out overnight. SOB and abdominal distension improving. Lasix down titrated to 40mg IV BID today. Troponin elevated at [phone] higher than previous baseline. Likely related to ADHF but given complaints of chest pain will do nuclear stress test in AM. Originally restarted on ARB due to continuation of cough but likely needs longer discontinuation period. Continue BB, Digoxin, Isordil and Hydralazine for good afterload reduction         Review of Systems   Constitution: Negative for chills, decreased appetite, diaphoresis, fever and weakness.   Cardiovascular: Positive for dyspnea on exertion. Negative for chest pain, claudication, cyanosis, irregular heartbeat, leg swelling, near-syncope, orthopnea, palpitations, paroxysmal nocturnal dyspnea and syncope.   Respiratory: Negative for cough, hemoptysis, shortness of breath and wheezing.    Gastrointestinal: Positive for bloating. Negative for abdominal pain, constipation, diarrhea, melena, nausea and vomiting.   Neurological: Negative for dizziness.      Objective:     Vital Signs (Most Recent):  Temp: 98 °F (36.7 °C) (12/21/17 1136)  Pulse: (!) 58 (12/21/17 1136)  Resp: 18 (12/21/17 1136)  BP: 134/69 (12/21/17 1136)  SpO2: 96 % (12/21/17 1132) Vital Signs (24h Range):  Temp:  [96.5 °F (35.8 °C)-98.2 °F (36.8 °C)] 98 °F (36.7 °C)  Pulse:  [58-81] 58  Resp:  [15-22] 18  SpO2:  [95 %-99 %] 96 %  BP: (121-162)/(58-86) 134/69     Weight: 93.9 kg (207 lb)  Body mass index is 33.41 kg/m².     SpO2: 96 %  O2 Device (Oxygen Therapy): nasal cannula      Intake/Output Summary (Last 24 hours) at 12/21/17 1443  Last data filed at 12/21/17 1200   Gross per 24 hour   Intake              735 ml   Output             4825 ml   Net            -4090 ml       Lines/Drains/Airways     Drain                 Urethral Catheter 12/20/17 0200 Non-latex 14 Fr. 1 day          Peripheral Intravenous Line                 Peripheral IV - Single Lumen 12/21/17 0945 Right Forearm less than 1 day                Physical Exam   Constitutional: She is oriented to person, place, and time. She appears well-developed and well-nourished. No distress.   Cardiovascular: Normal rate and regular rhythm.  Exam reveals no gallop.    No murmur heard.  Pulmonary/Chest: Effort normal and breath sounds normal. No respiratory distress. She has no wheezes.   Abdominal: Soft. Bowel sounds are normal. She exhibits no distension. There is no tenderness.   Neurological: She is alert and oriented to person, place, and time.   Skin: Skin is warm and dry.       Significant Labs:       Recent Labs  Lab 12/21/17  0613      K 3.6      CO2 29   BUN 27*   CREATININE 1.0   MG 1.8       Recent Labs  Lab 12/21/17  0613   WBC 9.04   RBC 5.77*   HGB 9.6*   HCT 32.7*      MCV 57*   MCH 16.6*   MCHC 29.4*       Significant Imaging: Echocardiogram:   2D echo with color flow doppler:   Results for orders placed or performed during the hospital encounter of 12/19/17   2D echo with color flow doppler   Result Value  Ref Range    EF 20 (A) 55 - 65    Mitral Valve Regurgitation MILD     Pericardial Effusion SMALL (A)      Assessment and Plan:     Brief HPI: Seen this afternoon on rounds with Dr. Moreno while resting in bed. Reports SOB and abdominal distension improving. Discussed POC as detailed below-verbalized understanding and agrees with POC     Acute on chronic systolic heart failure    -LVEF 20-25% down from 40 % 3 mo ago but similar to echo 1 year ago.  -NICM per [facility] in 01/2015 noting severe LVH, DD, and normal coronary arteries  -Elevated BNP 2547 along with elevated Tbili of 3 and CHF pattern on CXR  -aggressively diuresed with IV Lasix TID with 4.2 liters out overnight; SOB and abdominal distension improving with down titration of IV Lasix  -continue BB, Digoxin, Hydralazine and Isordil for good afterload reduction; will discontinue ARB due to complaints of cough and need for longer discontinuation period to assess cough response   -continue with accurate I&O, daily weights            Paroxysmal atrial fibrillation    -EKG with  PACs   -recent AICD interrogation with no indication of mode switch  -continue BB, digoxin, and Eliquis         Essential hypertension    -SBP 120s-140s  -Continue Bidil, Coreg, Hydralazine, and will hold ARB        Elevated troponin    -TNI [phone] with no acute EKG changes  -TNI chronically elevated but currently higher than baseline  -complaints of chest pain; will do stress test for ischemic evaluation; low likelihood of CAD given normal coronaries in 2015 but warranted with chest pain and elevated troponin             VTE Risk Mitigation         Ordered     Medium Risk of VTE  Once      12/20/17 1542     Place sequential compression device  Until discontinued      12/20/17 1542     Reason for No Pharmacological VTE Prophylaxis  Once      12/20/17 1542     apixaban tablet 5 mg  2 times daily     Route:  Oral        12/19/17 4917          LYNN Vigil,  BAYRON  Cardiology  [facility]

## 2017-12-21 NOTE — ASSESSMENT & PLAN NOTE
-LVEF 20-25% down from 40 % 3 mo ago but similar to echo 1 year ago.  -NICM per [facility] in 01/2015 noting severe LVH, DD, and normal coronary arteries  -Elevated BNP 2547 along with elevated Tbili of 3 and CHF pattern on CXR  -aggressively diuresed with IV Lasix TID with 4.2 liters out overnight; SOB and abdominal distension improving with down titration of IV Lasix  -continue BB, Digoxin, Hydralazine and Isordil for good afterload reduction; will discontinue ARB due to complaints of cough and need for longer discontinuation period to assess cough response   -continue with accurate I&O, daily weights

## 2017-12-21 NOTE — SUBJECTIVE & OBJECTIVE
Review of Systems   Constitution: Negative for chills, decreased appetite, diaphoresis, fever and weakness.   Cardiovascular: Positive for dyspnea on exertion. Negative for chest pain, claudication, cyanosis, irregular heartbeat, leg swelling, near-syncope, orthopnea, palpitations, paroxysmal nocturnal dyspnea and syncope.   Respiratory: Negative for cough, hemoptysis, shortness of breath and wheezing.    Gastrointestinal: Positive for bloating. Negative for abdominal pain, constipation, diarrhea, melena, nausea and vomiting.   Neurological: Negative for dizziness.     Objective:     Vital Signs (Most Recent):  Temp: 98 °F (36.7 °C) (12/21/17 1136)  Pulse: (!) 58 (12/21/17 1136)  Resp: 18 (12/21/17 1136)  BP: 134/69 (12/21/17 1136)  SpO2: 96 % (12/21/17 1132) Vital Signs (24h Range):  Temp:  [96.5 °F (35.8 °C)-98.2 °F (36.8 °C)] 98 °F (36.7 °C)  Pulse:  [58-81] 58  Resp:  [15-22] 18  SpO2:  [95 %-99 %] 96 %  BP: (121-162)/(58-86) 134/69     Weight: 93.9 kg (207 lb)  Body mass index is 33.41 kg/m².     SpO2: 96 %  O2 Device (Oxygen Therapy): nasal cannula      Intake/Output Summary (Last 24 hours) at 12/21/17 1443  Last data filed at 12/21/17 1200   Gross per 24 hour   Intake              735 ml   Output             4825 ml   Net            -4090 ml       Lines/Drains/Airways     Drain                 Urethral Catheter 12/20/17 0200 Non-latex 14 Fr. 1 day          Peripheral Intravenous Line                 Peripheral IV - Single Lumen 12/21/17 0945 Right Forearm less than 1 day                Physical Exam   Constitutional: She is oriented to person, place, and time. She appears well-developed and well-nourished. No distress.   Cardiovascular: Normal rate and regular rhythm.  Exam reveals no gallop.    No murmur heard.  Pulmonary/Chest: Effort normal and breath sounds normal. No respiratory distress. She has no wheezes.   Abdominal: Soft. Bowel sounds are normal. She exhibits no distension. There is no  tenderness.   Neurological: She is alert and oriented to person, place, and time.   Skin: Skin is warm and dry.       Significant Labs:       Recent Labs  Lab 12/21/17  0613      K 3.6      CO2 29   BUN 27*   CREATININE 1.0   MG 1.8       Recent Labs  Lab 12/21/17  0613   WBC 9.04   RBC 5.77*   HGB 9.6*   HCT 32.7*      MCV 57*   MCH 16.6*   MCHC 29.4*       Significant Imaging: Echocardiogram:   2D echo with color flow doppler:   Results for orders placed or performed during the hospital encounter of 12/19/17   2D echo with color flow doppler   Result Value Ref Range    EF 20 (A) 55 - 65    Mitral Valve Regurgitation MILD     Pericardial Effusion SMALL (A)

## 2017-12-21 NOTE — PLAN OF CARE
Pt had a 8 beat run of vtach on the monitor. Pt asymptomatic. Dr. Lane notified. No new orders at this time.

## 2017-12-22 VITALS
BODY MASS INDEX: 33.27 KG/M2 | OXYGEN SATURATION: 99 % | SYSTOLIC BLOOD PRESSURE: 118 MMHG | TEMPERATURE: 97 F | HEART RATE: 68 BPM | WEIGHT: 207 LBS | DIASTOLIC BLOOD PRESSURE: 69 MMHG | RESPIRATION RATE: 20 BRPM | HEIGHT: 66 IN

## 2017-12-22 LAB
ALBUMIN SERPL BCP-MCNC: 2.7 G/DL
ALP SERPL-CCNC: 48 U/L
ALT SERPL W/O P-5'-P-CCNC: 26 U/L
ANION GAP SERPL CALC-SCNC: 9 MMOL/L
ANISOCYTOSIS BLD QL SMEAR: ABNORMAL
AST SERPL-CCNC: 17 U/L
BASO STIPL BLD QL SMEAR: ABNORMAL
BASOPHILS # BLD AUTO: 0.01 K/UL
BASOPHILS NFR BLD: 0.1 %
BILIRUB SERPL-MCNC: 1.9 MG/DL
BUN SERPL-MCNC: 29 MG/DL
BURR CELLS BLD QL SMEAR: ABNORMAL
CALCIUM SERPL-MCNC: 8.4 MG/DL
CHLORIDE SERPL-SCNC: 103 MMOL/L
CO2 SERPL-SCNC: 29 MMOL/L
CREAT SERPL-MCNC: 1.1 MG/DL
DACRYOCYTES BLD QL SMEAR: ABNORMAL
DIASTOLIC DYSFUNCTION: NO
DIFFERENTIAL METHOD: ABNORMAL
EOSINOPHIL # BLD AUTO: 0.2 K/UL
EOSINOPHIL NFR BLD: 2.5 %
ERYTHROCYTE [DISTWIDTH] IN BLOOD BY AUTOMATED COUNT: 27.5 %
EST. GFR  (AFRICAN AMERICAN): >60 ML/MIN/1.73 M^2
EST. GFR  (NON AFRICAN AMERICAN): 58 ML/MIN/1.73 M^2
GLUCOSE SERPL-MCNC: 101 MG/DL
HCT VFR BLD AUTO: 33.5 %
HGB BLD-MCNC: 9.9 G/DL
HYPOCHROMIA BLD QL SMEAR: ABNORMAL
LYMPHOCYTES # BLD AUTO: 1.5 K/UL
LYMPHOCYTES NFR BLD: 17.7 %
MAGNESIUM SERPL-MCNC: 1.8 MG/DL
MCH RBC QN AUTO: 16.8 PG
MCHC RBC AUTO-ENTMCNC: 29.6 G/DL
MCV RBC AUTO: 57 FL
MONOCYTES # BLD AUTO: 0.6 K/UL
MONOCYTES NFR BLD: 7.4 %
NEUTROPHILS # BLD AUTO: 6.2 K/UL
NEUTROPHILS NFR BLD: 72.3 %
OVALOCYTES BLD QL SMEAR: ABNORMAL
PHOSPHATE SERPL-MCNC: 4.1 MG/DL
PLATELET # BLD AUTO: 213 K/UL
PLATELET BLD QL SMEAR: ABNORMAL
PMV BLD AUTO: ABNORMAL FL
POCT GLUCOSE: 115 MG/DL (ref 70–110)
POIKILOCYTOSIS BLD QL SMEAR: ABNORMAL
POLYCHROMASIA BLD QL SMEAR: ABNORMAL
POTASSIUM SERPL-SCNC: 3.4 MMOL/L
PROCALCITONIN SERPL IA-MCNC: 0.13 NG/ML
PROT SERPL-MCNC: 5.6 G/DL
RBC # BLD AUTO: 5.89 M/UL
SCHISTOCYTES BLD QL SMEAR: PRESENT
SODIUM SERPL-SCNC: 141 MMOL/L
TARGETS BLD QL SMEAR: ABNORMAL
TROPONIN I SERPL DL<=0.01 NG/ML-MCNC: 0.58 NG/ML
TROPONIN I SERPL DL<=0.01 NG/ML-MCNC: 0.6 NG/ML
TROPONIN I SERPL DL<=0.01 NG/ML-MCNC: 0.68 NG/ML
WBC # BLD AUTO: 8.55 K/UL

## 2017-12-22 PROCEDURE — 94761 N-INVAS EAR/PLS OXIMETRY MLT: CPT

## 2017-12-22 PROCEDURE — 63600175 PHARM REV CODE 636 W HCPCS: Performed by: FAMILY MEDICINE

## 2017-12-22 PROCEDURE — 94640 AIRWAY INHALATION TREATMENT: CPT

## 2017-12-22 PROCEDURE — 93016 CV STRESS TEST SUPVJ ONLY: CPT | Mod: ,,, | Performed by: INTERNAL MEDICINE

## 2017-12-22 PROCEDURE — 25000003 PHARM REV CODE 250: Performed by: FAMILY MEDICINE

## 2017-12-22 PROCEDURE — 36415 COLL VENOUS BLD VENIPUNCTURE: CPT

## 2017-12-22 PROCEDURE — 83735 ASSAY OF MAGNESIUM: CPT

## 2017-12-22 PROCEDURE — 84484 ASSAY OF TROPONIN QUANT: CPT | Mod: 91

## 2017-12-22 PROCEDURE — 84100 ASSAY OF PHOSPHORUS: CPT

## 2017-12-22 PROCEDURE — 93018 CV STRESS TEST I&R ONLY: CPT | Mod: ,,, | Performed by: INTERNAL MEDICINE

## 2017-12-22 PROCEDURE — 85025 COMPLETE CBC W/AUTO DIFF WBC: CPT

## 2017-12-22 PROCEDURE — 25000242 PHARM REV CODE 250 ALT 637 W/ HCPCS

## 2017-12-22 PROCEDURE — 80053 COMPREHEN METABOLIC PANEL: CPT

## 2017-12-22 PROCEDURE — 84484 ASSAY OF TROPONIN QUANT: CPT

## 2017-12-22 PROCEDURE — 25000003 PHARM REV CODE 250

## 2017-12-22 PROCEDURE — 99232 SBSQ HOSP IP/OBS MODERATE 35: CPT | Mod: ,,, | Performed by: INTERNAL MEDICINE

## 2017-12-22 RX ORDER — MOXIFLOXACIN HYDROCHLORIDE 400 MG/1
400 TABLET ORAL DAILY
Qty: 3 TABLET | Refills: 0 | Status: SHIPPED | OUTPATIENT
Start: 2017-12-22 | End: 2017-12-26

## 2017-12-22 RX ORDER — POTASSIUM CHLORIDE 20 MEQ/1
40 TABLET, EXTENDED RELEASE ORAL ONCE
Status: DISCONTINUED | OUTPATIENT
Start: 2017-12-22 | End: 2017-12-22 | Stop reason: HOSPADM

## 2017-12-22 RX ORDER — PREDNISONE 20 MG/1
40 TABLET ORAL DAILY
Qty: 6 TABLET | Refills: 0 | Status: SHIPPED | OUTPATIENT
Start: 2017-12-22 | End: 2017-12-25

## 2017-12-22 RX ORDER — POTASSIUM CHLORIDE 20 MEQ/1
20 TABLET, EXTENDED RELEASE ORAL ONCE
Status: DISCONTINUED | OUTPATIENT
Start: 2017-12-22 | End: 2017-12-22

## 2017-12-22 RX ORDER — POTASSIUM CHLORIDE 20 MEQ/1
40 TABLET, EXTENDED RELEASE ORAL ONCE
Status: COMPLETED | OUTPATIENT
Start: 2017-12-22 | End: 2017-12-22

## 2017-12-22 RX ORDER — OSELTAMIVIR PHOSPHATE 75 MG/1
75 CAPSULE ORAL DAILY
Qty: 1 CAPSULE | Refills: 0 | Status: SHIPPED | OUTPATIENT
Start: 2017-12-22 | End: 2017-12-23

## 2017-12-22 RX ORDER — SPIRONOLACTONE 25 MG/1
25 TABLET ORAL DAILY
Qty: 30 TABLET | Refills: 11 | Status: ON HOLD | OUTPATIENT
Start: 2017-12-22 | End: 2018-02-06 | Stop reason: HOSPADM

## 2017-12-22 RX ORDER — LANOLIN ALCOHOL/MO/W.PET/CERES
400 CREAM (GRAM) TOPICAL 2 TIMES DAILY
Status: DISCONTINUED | OUTPATIENT
Start: 2017-12-22 | End: 2017-12-22 | Stop reason: HOSPADM

## 2017-12-22 RX ADMIN — ISOSORBIDE DINITRATE 20 MG: 20 TABLET ORAL at 10:12

## 2017-12-22 RX ADMIN — IPRATROPIUM BROMIDE AND ALBUTEROL SULFATE 3 ML: .5; 3 SOLUTION RESPIRATORY (INHALATION) at 12:12

## 2017-12-22 RX ADMIN — APIXABAN 5 MG: 5 TABLET, FILM COATED ORAL at 10:12

## 2017-12-22 RX ADMIN — MAGNESIUM OXIDE TAB 400 MG (241.3 MG ELEMENTAL MG) 400 MG: 400 (241.3 MG) TAB at 10:12

## 2017-12-22 RX ADMIN — FUROSEMIDE 40 MG: 10 INJECTION, SOLUTION INTRAMUSCULAR; INTRAVENOUS at 10:12

## 2017-12-22 RX ADMIN — DIGOXIN 0.12 MG: 0.12 TABLET ORAL at 10:12

## 2017-12-22 RX ADMIN — OSELTAMIVIR PHOSPHATE 75 MG: 75 CAPSULE ORAL at 10:12

## 2017-12-22 RX ADMIN — CARVEDILOL 25 MG: 25 TABLET, FILM COATED ORAL at 10:12

## 2017-12-22 RX ADMIN — IPRATROPIUM BROMIDE AND ALBUTEROL SULFATE 3 ML: .5; 3 SOLUTION RESPIRATORY (INHALATION) at 04:12

## 2017-12-22 RX ADMIN — POTASSIUM CHLORIDE 40 MEQ: 20 TABLET, EXTENDED RELEASE ORAL at 10:12

## 2017-12-22 RX ADMIN — HYDRALAZINE HYDROCHLORIDE 25 MG: 25 TABLET, FILM COATED ORAL at 10:12

## 2017-12-22 RX ADMIN — PANTOPRAZOLE SODIUM 40 MG: 40 TABLET, DELAYED RELEASE ORAL at 10:12

## 2017-12-22 NOTE — PLAN OF CARE
Problem: Patient Care Overview  Goal: Plan of Care Review  Outcome: Ongoing (interventions implemented as appropriate)  Pt AAOx4. Respirations even,unlabored on room air. sats @ 93-95%. Pt denies pain at this time. Pain controlled on current regime. Encouraged pt to call for assistance. Call light within reach. No distress noted. Safety maintained. Will continue to monitor.

## 2017-12-22 NOTE — SUBJECTIVE & OBJECTIVE
Review of Systems   Constitution: Negative for chills, decreased appetite, diaphoresis, fever and weakness.   Cardiovascular: Negative for chest pain, claudication, cyanosis, dyspnea on exertion, irregular heartbeat, leg swelling, near-syncope, orthopnea, palpitations, paroxysmal nocturnal dyspnea and syncope.   Respiratory: Negative for cough, hemoptysis, shortness of breath and wheezing.    Gastrointestinal: Negative for bloating, abdominal pain, constipation, diarrhea, melena, nausea and vomiting.   Neurological: Negative for dizziness.     Objective:     Vital Signs (Most Recent):  Temp: 97.4 °F (36.3 °C) (12/22/17 1242)  Pulse: 70 (12/22/17 1242)  Resp: 20 (12/22/17 1242)  BP: 118/69 (12/22/17 1242)  SpO2: 99 % (12/22/17 1222) Vital Signs (24h Range):  Temp:  [96.3 °F (35.7 °C)-98.5 °F (36.9 °C)] 97.4 °F (36.3 °C)  Pulse:  [63-77] 70  Resp:  [15-20] 20  SpO2:  [93 %-99 %] 99 %  BP: (114-155)/(56-92) 118/69     Weight: 93.9 kg (207 lb)  Body mass index is 33.41 kg/m².     SpO2: 99 %  O2 Device (Oxygen Therapy): room air      Intake/Output Summary (Last 24 hours) at 12/22/17 1359  Last data filed at 12/22/17 1000   Gross per 24 hour   Intake                0 ml   Output             1550 ml   Net            -1550 ml       Lines/Drains/Airways     Peripheral Intravenous Line                 Peripheral IV - Single Lumen 12/21/17 0945 Right Forearm 1 day                Physical Exam   Constitutional: She is oriented to person, place, and time. She appears well-developed and well-nourished. No distress.   Cardiovascular: Normal rate and regular rhythm.  Exam reveals no gallop.    No murmur heard.  Pulmonary/Chest: Effort normal and breath sounds normal. No respiratory distress. She has no wheezes.   Abdominal: Soft. Bowel sounds are normal. She exhibits no distension. There is no tenderness.   Neurological: She is alert and oriented to person, place, and time.   Skin: Skin is warm and dry.       Significant Labs:        Recent Labs  Lab 12/22/17  0603   WBC 8.55   RBC 5.89*   HGB 9.9*   HCT 33.5*      MCV 57*   MCH 16.8*   MCHC 29.6*       Recent Labs  Lab 12/22/17  0603      K 3.4*      CO2 29   BUN 29*   CREATININE 1.1   MG 1.8       Significant Imaging: Echocardiogram:   2D echo with color flow doppler:   Results for orders placed or performed during the hospital encounter of 12/19/17   2D echo with color flow doppler   Result Value Ref Range    EF 20 (A) 55 - 65    Mitral Valve Regurgitation MILD     Pericardial Effusion SMALL (A)

## 2017-12-22 NOTE — PLAN OF CARE
Problem: Patient Care Overview  Goal: Plan of Care Review  Outcome: Ongoing (interventions implemented as appropriate)  Administer nebulizer treatments as ordered and encourage deep breathing / cough.  Continued monitoring of patient's oxygenation status.

## 2017-12-22 NOTE — PROGRESS NOTES
Progress Note  [facility]  Admit Date: 12/19/2017   LOS: 1 day   SUBJECTIVE:   Follow-up For: CHF/COPD    Patient seen and examined this AM. Says her breathing has improved since admit.    ROS  Denies chest pain, SOB, f/c, extremity swelling    OBJECTIVE:   Vital Signs (Most Recent)  Temp: 97.3 °F (36.3 °C) (12/22/17 0745)  Pulse: 64 (12/22/17 0745)  Resp: 20 (12/22/17 0745)  BP: (!) 155/92 (12/22/17 0745)  SpO2: (!) 94 % (12/22/17 0402)    I & O (Last 24H):  Intake/Output Summary (Last 24 hours) at 12/22/17 0827  Last data filed at 12/22/17 0438   Gross per 24 hour   Intake              485 ml   Output             2025 ml   Net            -1540 ml     Wt Readings from Last 3 Encounters:   12/19/17 93.9 kg (207 lb)   12/13/17 94.3 kg (208 lb)   09/20/17 93.6 kg (206 lb 7 oz)       Current Diet Order   Procedures    Diet NPO        Physical Exam  General: AAOx3. NAD.  HEENT: NCAT.  CV: RRR. NL S1/S2.  Left upper chest wall AICD  Chest: clear to auscultation bilaterally, normal effort  Abd: +BS x 4. Soft. ND/NT. No rebound or guarding.   Ext: peripheral pulses intact. No edema  Neuro: No focal deficit.   Psych: Good judgement and reason. No abnormal behaviors noted    Laboratory Data:  CBC    Recent Labs  Lab 12/20/17  1039 12/21/17  0613 12/22/17  0603   WBC 5.27 9.04 8.55   RBC 5.90* 5.77* 5.89*   HGB 9.8* 9.6* 9.9*   HCT 33.4* 32.7* 33.5*    260 213   MCV 57* 57* 57*   MCH 16.6* 16.6* 16.8*   MCHC 29.3* 29.4* 29.6*     CMP    Recent Labs  Lab 12/20/17  0557 12/21/17  0613 12/22/17  0603   CALCIUM 8.7 8.7 8.4*   PROT 6.3 6.1 5.6*    141 141   K 3.8 3.6 3.4*   CO2 25 29 29    102 103   BUN 25* 27* 29*   CREATININE 1.0 1.0 1.1   ALKPHOS 59 51* 48*   ALT 35 29 26   AST 25 17 17   BILITOT 3.0* 2.4* 1.9*     POCT-Glucose  POCT Glucose   Date Value Ref Range Status   12/22/2017 115 (H) 70 - 110 mg/dL Final   12/21/2017 122 (H) 70 - 110 mg/dL Final   12/21/2017 140 (H) 70 - 110 mg/dL Final    12/21/2017 107 70 - 110 mg/dL Final   12/21/2017 106 70 - 110 mg/dL Final   12/20/2017 251 (H) 70 - 110 mg/dL Final     COAGS    Recent Labs  Lab 12/21/17  1855   INR 1.0     MICRO  Microbiology Results (last 7 days)     Procedure Component Value Units Date/Time    Blood culture [[id number]] Collected:  12/20/17 0007    Order Status:  Completed Specimen:  Blood from Peripheral, Wrist, Right Updated:  12/22/17 0812     Blood Culture, Routine No Growth to date     Blood Culture, Routine No Growth to date     Blood Culture, Routine No Growth to date    Blood culture [[id number]] Collected:  12/20/17 0007    Order Status:  Completed Specimen:  Blood from Peripheral, Upper Arm, Left Updated:  12/22/17 0812     Blood Culture, Routine No Growth to date     Blood Culture, Routine No Growth to date     Blood Culture, Routine No Growth to date        LIPID PANEL  Lab Results   Component Value Date    CHOL 165 12/07/2017     Lab Results   Component Value Date    HDL 54 12/07/2017     Lab Results   Component Value Date    LDLCALC 77.8 12/07/2017     Lab Results   Component Value Date    TRIG 166 (H) 12/07/2017     Lab Results   Component Value Date    CHOLHDL 32.7 12/07/2017     ASSESSMENT/PLAN:   Hafsa Hawley is a 52 y.o. female with PMHX of CHF, HTN with complaints of SOB admitted for CHF exacerbation with nuclear stress test scheduled for today.          Shortness of breath     -Per patient prior episodes 2/2 CHF. Also concerns for mold exposure  -Likely multifactorial. Pt with hx of anemia likely 2/2 Thalassemiaanemia. Baseline H/H  9.5/30. Pt at baseline  -Treating for both acute HF and COPD (Lasix, Solumedrol, Moxifloxacin, Duonebs)  -net -6340 ml since admit  -2D echo with EF 20   -Nuclear stress test today       Abdominal pain     - vague complaints on admit with elevated T bili  - history of elevated T bili, suspect from Thalassemia trait  - RUQ US showed known right adrenal gland lesion and small right renal cyst  and mild hepatomegaly.  -T.Bili trending down since admit. INR/PT WNL       Acute heart failure     - on admit, elevated BNP 2547 with findings suggestive of acute HF on CXR  - no edema or JVD, without appreciable crackles on lung exam  - 2D echo EF 40 with DD (9/2017) s/p ICD placement  - Restarted home isosorbide dinitrate, hydralazine, carvedilol. D/c losartan  - lasix IV 80 mg BID  -Net -6340 ml since admit  -2D echo with EF 20   -skyrockitti Castañeda consulted with plans for nuclear stress test today  -Pt with symptomatic improvement since admit. No longer requiring supplemental oxygen          COPD (chronic obstructive pulmonary disease)     - Denies smoking hx but reports excess second hand smoke  - on solumedrol 125 mg q8h, duo-nebs q4, moxifloxacin IV  - may require outpatient PFT if patient has not had one recently       Paroxysmal atrial fibrillation     - NSR  - Rate controlled on digoxin and carvedilol. Will continue  - AOC with eliquis  - TSH 1.503 (12/2017)       MELISSA (obstructive sleep apnea)     - Possible longstanding uncontrolled MELISSA that may contribute to patient's long standing symptoms  - CPAP QHS          Essential hypertension     - see acute heart failure medications  - -150s/50-90s  - Restarted home meds          Anemia     - Previous electrophoresis (9/2017) suggest likely thalassemia trait  - Baseline H/H 9.5/30. MCV 52 at baseline.  -Patient at baseline this AM       Elevated troponin     - Elevated trop on admit. Trended down  - Seen by [facility] with plan for stress test today          PPX: Eliquis       Dispo: F/U oren, stress test results      12/22/2017 Peri Lane MD  8:27 AM

## 2017-12-22 NOTE — ASSESSMENT & PLAN NOTE
-TNI [phone] with no acute EKG changes  -TNI chronically elevated but currently higher than baseline  -complaints of chest pain with stress test with fixed defect and no reversiblel ischemia  -continue CHF management

## 2017-12-22 NOTE — ASSESSMENT & PLAN NOTE
-LVEF 20-25% down from 40 % 3 mo ago but similar to echo 1 year ago.  -NICM per [facility] in 01/2015 noting severe LVH, DD, and normal coronary arteries  -Elevated BNP 2547 along with elevated Tbili of 3 and CHF pattern on CXR  -aggressively diuresed with IV Lasix TID with 2.8 liters out overnight and negative 6.3 liters since admission  -SOB and abdominal distension improving and almost back to baseline of IV Lasix  -continue BB, Digoxin, Hydralazine and Isordil for good afterload reduction; will discontinue ARB due to complaints of cough and need for longer discontinuation period to assess cough response; recommend addition of Aldactone  -stressed importance of strict adherence to low salt diet  -anticipate resumption of oral diuretics later today vs tomorrow with discharge later today vs tomorrow; recommend daily weights with instructions of extra dose of Lasix for increased weight of 2-3 lbs in 24hours

## 2017-12-22 NOTE — PLAN OF CARE
Ambulatory O2 eval done. pts sats while sitting up before ambulaton were 93%. While ambulating pt sats were 87-88%. Once pt went to a stand still position while standing  her sats went to 90%. Slight SOB noticed while ambulating.

## 2017-12-22 NOTE — NURSING
Pt lying in bed without O2-94% Pulse-69  Pt walking from bed to elevator windows without O2-90%  Pulse- 88  Pt resting in bed post walk without O2-93%, Quick dip to 89% then back up to 93% P-81.

## 2017-12-22 NOTE — ASSESSMENT & PLAN NOTE
-s/P ICD for primary prevention   -normal coronary arteries per [facility] in 01/2015; nuclear stress test today with fixed defect and no reversible ischemia  -EF improved with optimal medical therapy to 40%, recent discontinuation of ARB in setting of cough and will remain off; conitnue BB, BiDil and recommend addition of Aldactone

## 2017-12-22 NOTE — PROGRESS NOTES
.Pharmacy New Medication Education    Patient accepted medication education.    Pharmacy educated patient on name and purpose of medications and possible side effects, using the teach-back method.     D/C Current Inpatient Medication Orders   D/C acetaminophen tablet 650 mg   D/C aluminum-magnesium hydroxide-simethicone 200-200-20 mg/5 mL suspension 30 mL   D/C cyclobenzaprine tablet 5 mg   D/C dextrose 5 % and 0.9 % NaCl with KCl 20 mEq infusion   D/C famotidine tablet 20 mg   D/C gabapentin capsule 600 mg   D/C heparin (porcine) injection 5,000 Units   D/C losartan tablet 100 mg   D/C metoprolol succinate (TOPROL-XL) 24 hr tablet 100 mg   D/C NIFEdipine 24 hr tablet 30 mg   D/C ondansetron disintegrating tablet 8 mg   D/C oxyCODONE immediate release tablet 5 mg   D/C ramelteon tablet 8 mg   D/C senna-docusate 8.6-50 mg per tablet 2 tablet   D/C tacrolimus capsule 1 mg   D/C tacrolimus capsule 2 mg   D/C traMADol tablet 100 mg       Learners of pharmacy medication education included:  Patient    Patient +/- learner response:  Verbalized Understanding, Teachback

## 2017-12-22 NOTE — PROGRESS NOTES
[facility]  Cardiology  Progress Note    Patient Name: Hafsa Hawley  MRN: [id number]  Admission Date: 12/19/2017  Hospital Length of Stay: 1 days  Code Status: Full Code   Attending Physician: Burke Shah MD   Primary Care Physician: Haylie Lion MD  Expected Discharge Date: 12/22/2017  Principal Problem:Shortness of breath    Subjective:     Hospital Course:   12/20/2017 Patient presented to the ED with progressively worsening SOB x 3 weeks. LVEF 20-25% with severe LAE, normal RV function, small pericardial effusion, mild MR per Echo. BNP elevated 2547, Tbili 3, TNI 0.095, 0.655, 0.684, 0.776, 0.683. ECG SR with LVH no acute ischemic changes. Influenza negative. Hypertensive with SBP >180- responded to hydralazine. Patient was given RT, steroids, abx, tamiflu, and IV Lasix in the ED. Responding well to treatment.    12/21/2017 Place on IV Lasix 80mg TID with good response and 4.2 liters out overnight. SOB and abdominal distension improving. Lasix down titrated to 40mg IV BID today. Troponin elevated at [phone] higher than previous baseline. Likely related to ADHF but given complaints of chest pain will do nuclear stress test in AM. Originally restarted on ARB due to continuation of cough but likely needs longer discontinuation period. Continue BB, Digoxin, Isordil and Hydralazine for good afterload reduction   12/22/2017 Remains of IV Lasix BID with good response. 2.8 liters out overnight negative 6.3 liters since admission. SOB improved along with abdominal distension. Stress test this AM fixed defect and no active ischemia          Review of Systems   Constitution: Negative for chills, decreased appetite, diaphoresis, fever and weakness.   Cardiovascular: Negative for chest pain, claudication, cyanosis, dyspnea on exertion, irregular heartbeat, leg swelling, near-syncope, orthopnea, palpitations, paroxysmal nocturnal dyspnea and syncope.   Respiratory: Negative for  cough, hemoptysis, shortness of breath and wheezing.    Gastrointestinal: Negative for bloating, abdominal pain, constipation, diarrhea, melena, nausea and vomiting.   Neurological: Negative for dizziness.     Objective:     Vital Signs (Most Recent):  Temp: 97.4 °F (36.3 °C) (12/22/17 1242)  Pulse: 70 (12/22/17 1242)  Resp: 20 (12/22/17 1242)  BP: 118/69 (12/22/17 1242)  SpO2: 99 % (12/22/17 1222) Vital Signs (24h Range):  Temp:  [96.3 °F (35.7 °C)-98.5 °F (36.9 °C)] 97.4 °F (36.3 °C)  Pulse:  [63-77] 70  Resp:  [15-20] 20  SpO2:  [93 %-99 %] 99 %  BP: (114-155)/(56-92) 118/69     Weight: 93.9 kg (207 lb)  Body mass index is 33.41 kg/m².     SpO2: 99 %  O2 Device (Oxygen Therapy): room air      Intake/Output Summary (Last 24 hours) at 12/22/17 1359  Last data filed at 12/22/17 1000   Gross per 24 hour   Intake                0 ml   Output             1550 ml   Net            -1550 ml       Lines/Drains/Airways     Peripheral Intravenous Line                 Peripheral IV - Single Lumen 12/21/17 0945 Right Forearm 1 day                Physical Exam   Constitutional: She is oriented to person, place, and time. She appears well-developed and well-nourished. No distress.   Cardiovascular: Normal rate and regular rhythm.  Exam reveals no gallop.    No murmur heard.  Pulmonary/Chest: Effort normal and breath sounds normal. No respiratory distress. She has no wheezes.   Abdominal: Soft. Bowel sounds are normal. She exhibits no distension. There is no tenderness.   Neurological: She is alert and oriented to person, place, and time.   Skin: Skin is warm and dry.       Significant Labs:       Recent Labs  Lab 12/22/17  0603   WBC 8.55   RBC 5.89*   HGB 9.9*   HCT 33.5*      MCV 57*   MCH 16.8*   MCHC 29.6*       Recent Labs  Lab 12/22/17  0603      K 3.4*      CO2 29   BUN 29*   CREATININE 1.1   MG 1.8       Significant Imaging: Echocardiogram:   2D echo with color flow doppler:   Results for orders placed  or performed during the hospital encounter of 12/19/17   2D echo with color flow doppler   Result Value Ref Range    EF 20 (A) 55 - 65    Mitral Valve Regurgitation MILD     Pericardial Effusion SMALL (A)      Assessment and Plan:     Brief HPI: Seen this afternoon on rounds with Dr. Krishna after stress test. Discussed POC as detailed below-verbalized understanding and agrees with plan     Acute on chronic systolic heart failure    -LVEF 20-25% down from 40 % 3 mo ago but similar to echo 1 year ago.  -NICM per [facility] in 01/2015 noting severe LVH, DD, and normal coronary arteries  -Elevated BNP 2547 along with elevated Tbili of 3 and CHF pattern on CXR  -aggressively diuresed with IV Lasix TID with 2.8 liters out overnight and negative 6.3 liters since admission  -SOB and abdominal distension improving and almost back to baseline of IV Lasix  -continue BB, Digoxin, Hydralazine and Isordil for good afterload reduction; will discontinue ARB due to complaints of cough and need for longer discontinuation period to assess cough response; recommend addition of Aldactone  -stressed importance of strict adherence to low salt diet  -anticipate resumption of oral diuretics later today vs tomorrow with discharge later today vs tomorrow; recommend daily weights with instructions of extra dose of Lasix for increased weight of 2-3 lbs in 24hours               Cardiomyopathy, nonischemic    -s/P ICD for primary prevention   -normal coronary arteries per [facility] in 01/2015; nuclear stress test today with fixed defect and no reversible ischemia  -EF improved with optimal medical therapy to 40%, recent discontinuation of ARB in setting of cough and will remain off; conitnue BB, BiDil and recommend addition of Aldactone          Paroxysmal atrial fibrillation    -EKG with SR PACs   -recent AICD interrogation with no indication of mode switch  -continue BB, digoxin, and Eliquis         Essential hypertension    -SBP 110s-130s  -Continue Bidil,  Coreg, Hydralazine, and will hold ARB; add Aldactone         Elevated troponin    -TNI [phone] with no acute EKG changes  -TNI chronically elevated but currently higher than baseline  -complaints of chest pain with stress test with fixed defect and no reversiblel ischemia  -continue CHF management             VTE Risk Mitigation         Ordered     Place LILIANA hose  Until discontinued      12/21/17 1851     Medium Risk of VTE  Once      12/20/17 1542     Place sequential compression device  Until discontinued      12/20/17 1542     Reason for No Pharmacological VTE Prophylaxis  Once      12/20/17 1542     apixaban tablet 5 mg  2 times daily     Route:  Oral        12/19/17 8187          LYNN Vigil, ANP  Cardiology  [facility]

## 2017-12-22 NOTE — NURSING
Pt is discharged. Instructions discussed with pt. New RX delivered to room. Will be contacted by [facility] for outpatient physical therapy. No need for home O2 at this time. Pt stated that she will get the flu shot at her MD office in the next week or so.Pt left for home via transport wheelchair, as brother waiting for her up front.

## 2017-12-22 NOTE — PLAN OF CARE
Pt to d/c today, pt will d/c home with family.  Patient will need o/p therapy patient provided with orders and will attend  Location close to her home. TN faxed clinicals to [phone] at Dr Cortez office.      patient will also need rollator for home use. TN called and updated MD. Orders to be entered.   Update: orders faxed to [facility] and updated Katarzyna in intake. Pt leaving hospital , and would like Rollator delivered to home. Pt would like to attend o/p therapy @ [facility]. Orders routed via [facility].      Future Appointments  Date Time Provider Department Center   1/4/2018 8:00 AM HOME MONITOR DEVICE CHECK, [facility]           12/22/17 7555   Final Note   Assessment Type Final Discharge Note   Discharge Disposition Home   What phone number can be called within the next 1-3 days to see how you are doing after discharge? [phone]   Hospital Follow Up  Appt(s) scheduled? Yes  (message sent to offices for follow up appt. pt will be contacted)   Discharge plans and expectations educations in teach back method with documentation complete? Yes   Right Care Referral Info   Post Acute Recommendation No Care

## 2017-12-25 LAB
BACTERIA BLD CULT: NORMAL
BACTERIA BLD CULT: NORMAL

## 2017-12-25 NOTE — DISCHARGE SUMMARY
Discharge Summary      Admit Date: 12/19/2017    Discharge Date and Time: 12/22/2017    Attending Physician: No att. providers found     Discharge Physician: Peri Lane    Principal Diagnoses: Shortness of breath  The primary encounter diagnosis was Anemia, unspecified type. Diagnoses of Chest pain, Cough, Shortness of breath, Congestive heart failure, unspecified congestive heart failure chronicity, unspecified congestive heart failure type, Acute heart failure, SOB (shortness of breath), Chronic obstructive pulmonary disease, unspecified COPD type, Total bilirubin, elevated, and Debility were also pertinent to this visit.    Discharged Condition: stable    Hospital Course: Hafsa Hawley is a 52 y.o. female with PMHx Anemia, Atrial fibrillation, HTN,  HFrEF (EF 40) with diastolic dysfunction s/p pacemaker, CAD who presented to the ED with complaints of shortness of breath. Did report hx of COPD due to extensive second hand smoke exposure. Also reported recent URI symptoms. BNP was elevated on admit with findings suggestive of acute HF on CXR. Pt had good response to IV Lasix. 2D echo during admission showed with EF 25%. [facility] was consulted and performed nuclear stress test. EKG portion was negative for ischemia. Nuclear portion showed heterogenous radiotracer uptake throughout the left ventricle with fixed defect along the lateral wall of the left ventricle. No definitive reversible defect. Pt had symptomatic improvement throughout admit and was deemed stable for discharge. She did not qualify for home oxygen.      Consults: IP CONSULT TO CARDIOLOGY-[facility]    Significant Diagnostic Studies: CXR, 2D echo, nuclear stress test    Treatments: IV steroids, IV Lasix, IV abx    Disposition: Home or Self Care    Patient Instructions:   Discharge Medication List as of 12/22/2017  2:01 PM      START taking these medications    Details   moxifloxacin (AVELOX) 400 mg tablet Take 1 tablet (400 mg total) by  mouth once daily., Starting Fri 12/22/2017, Until Mon 12/25/2017, Normal      oseltamivir (TAMIFLU) 75 MG capsule Take 1 capsule (75 mg total) by mouth once daily., Starting Fri 12/22/2017, Until Sat 12/23/2017, Normal      predniSONE (DELTASONE) 20 MG tablet Take 2 tablets (40 mg total) by mouth once daily., Starting Fri 12/22/2017, Until Mon 12/25/2017, Normal      spironolactone (ALDACTONE) 25 MG tablet Take 1 tablet (25 mg total) by mouth once daily., Starting Fri 12/22/2017, Until Sat 12/22/2018, Normal      walker Misc 1 Device by Misc.(Non-Drug; Combo Route) route as needed., Starting Fri 12/22/2017, OTC         CONTINUE these medications which have NOT CHANGED    Details   albuterol-ipratropium 2.5mg-0.5mg/3mL (DUO-NEB) 0.5 mg-3 mg(2.5 mg base)/3 mL nebulizer solution Take 1 mL by nebulization 2 (two) times daily., Starting 3/1/2017, Until Discontinued, Historical Med      alprazolam (XANAX) 2 MG Tab Take 1 tablet by mouth as needed., Starting 1/25/2016, Until Discontinued, Historical Med      b complex vitamins tablet Take 1 tablet by mouth once daily., Until Discontinued, Historical Med      benzonatate (TESSALON) 100 MG capsule Take 1 capsule (100 mg total) by mouth 3 (three) times daily as needed for Cough., Starting Wed 12/13/2017, Until Sat 12/23/2017, Print      carvedilol (COREG) 25 MG tablet Take 1 tablet (25 mg total) by mouth 2 (two) times daily., Starting u 9/1/2016, Until Wed 12/13/2017, Normal      cetirizine (ZYRTEC) 10 MG tablet Take 1 tablet by mouth once daily., Starting 1/15/2017, Until Discontinued, Historical Med      cyanocobalamin, vitamin B-12, 50 mcg tablet Take 5 mcg by mouth once daily., Until Discontinued, Historical Med      DIGOX 125 mcg tablet TAKE 1 TABLET BY MOUTH EVERY DAY, Normal      ELIQUIS 5 mg Tab TAKE 1 TABLET BY MOUTH TWICE DAILY, Normal      fluticasone (FLONASE) 50 mcg/actuation nasal spray SHAKE WELL AND U 1 SPR IEN QD, Historical Med      furosemide (LASIX) 80  MG tablet Take 1 tablet (80 mg total) by mouth 2 (two) times daily., Starting Wed 12/13/2017, Until Mon 12/18/2017, Print      gabapentin (NEURONTIN) 100 MG capsule Take 1 capsule by mouth every evening., Starting 1/3/2017, Until Discontinued, Historical Med      guaifenesin 100 mg/5 ml (ROBITUSSIN) 100 mg/5 mL syrup Take 200 mg by mouth 3 (three) times daily as needed for Cough., Until Discontinued, Historical Med      hydrALAZINE (APRESOLINE) 25 MG tablet TAKE 1 TABLET BY MOUTH TWICE DAILY, Normal      hydrocodone-acetaminophen 10-325mg (NORCO)  mg Tab TK 1 T PO Q 12 H PRN P, Historical Med      isosorbide dinitrate (ISORDIL) 20 MG tablet TAKE 1 TABLET BY MOUTH TWICE DAILY, Normal      lidocaine HCL 2% (XYLOCAINE) 2 % jelly Apply topically as needed., Starting 3/7/2017, Until Discontinued, Print      NITROSTAT 0.4 mg SL tablet Take 1 mg by mouth as needed., Starting 12/21/2015, Until Discontinued, Historical Med      pantoprazole (PROTONIX) 40 MG tablet Take 1 tablet by mouth once daily., Starting 1/14/2016, Until Discontinued, Historical Med      potassium chloride (KLOR-CON) 10 MEQ TbSR Take 1 tablet by mouth once daily., Starting 12/23/2016, Until Discontinued, Historical Med      PULMICORT FLEXHALER 90 mcg/actuation AePB Inhale 1 puff (90 mcg total) into the lungs 2 (two) times daily., Starting Wed 9/20/2017, Normal      SPIRIVA WITH HANDIHALER 18 mcg inhalation capsule Inhale 1 capsule (18 mcg total) into the lungs once daily. Controller, Starting Wed 9/20/2017, Normal      VENTOLIN HFA 90 mcg/actuation inhaler Inhale 2 puffs into the lungs 2 (two) times daily., Starting 3/1/2017, Until Discontinued, Historical Med         STOP taking these medications       ipratropium-albuterol (COMBIVENT RESPIMAT)  mcg/actuation inhaler Comments:   Reason for Stopping:         methylPREDNISolone (MEDROL DOSEPACK) 4 mg tablet Comments:   Reason for Stopping:                 Discharge Procedure Orders  WALKER FOR  "HOME USE   Order Specific Question Answer Comments   Type of Walker: Rollator    With wheels? Yes    Height: 5' 6" (1.676 m)    Weight: 93.9 kg (207 lb)    Length of need (1-99 months): 99    Does patient have medical equipment at home? none    Please check all that apply: Patient is unable to safely ambulate without equipment.    Please check all that apply: Patient needs help to get in and out of chair.      Ambulatory Referral to Physical/Occupational Therapy   Referral Priority: Routine Referral Type: Physical Medicine   Referral Reason: Specialty Services Required    Number of Visits Requested: 1      Activity as tolerated     Notify your health care provider if you experience any of the following:  increased confusion or weakness     Notify your health care provider if you experience any of the following:  difficulty breathing or increased cough     Activity as tolerated     Notify your health care provider if you experience any of the following:  difficulty breathing or increased cough     Notify your health care provider if you experience any of the following:  increased confusion or weakness         Peri Lane  12/25/2017  1:57 PM  "

## 2017-12-26 ENCOUNTER — PATIENT OUTREACH (OUTPATIENT)
Dept: ADMINISTRATIVE | Facility: CLINIC | Age: 52
End: 2017-12-26

## 2017-12-26 NOTE — PATIENT INSTRUCTIONS
When Your Child Has Congestive Heart Failure (CHF)  Congestive heart failure (CHF) is a condition in which the heart does not pump as well as it should. When this happens, fluid can build up in the lungs or body tissues (congestion). CHF can cause lung problems, organ failure, and other serious problems in the body. CHF can usually be treated, but it is important to find out the underlying cause. Your childs healthcare provider will evaluate your childs heart and discuss treatment options with you.  What causes congestive heart failure?  CHF often develops in children with certain heart defects present at birth (congenital heart defects). These include defects such as holes in the heart, which cause an increased amount of blood flow from one side of the heart to the other. This changes the dynamics of blood flow and can cause one side of the heart to become weaker. The heart then is unable to support the blood flow resulting in worsening heart function. CHF can also be caused by other types of heart problems such as cardiomyopathy, a condition in which the hearts pumping function is impaired. Some non-heart problems, such as kidney failure, can lead to CHF due to changes in the body's fluid balance or hormone changes that lead to high blood pressure.    What are the symptoms of CHF?  Symptoms vary but may include:  · Swelling (edema) in the face, abdomen, ankles, or feet  · Shortness of breath, rapid breathing, wheezing, or excessive coughing  · Sweating  · Weakness or tiredness  · Poor feeding and weight gain (in infants)  · Racing heartbeat  · Wheezing  · Abdominal pain and nausea  In older children, symptoms may also include:  · Weight loss  · Passing out  · Chest pain  · Tiring easily during exercise  How is the cause of congestive heart failure diagnosed?  Heart problems in children are usually diagnosed and treated by a pediatric cardiologist. This is a doctor who specializes in diagnosing and treating  children's heart disease. The cardiologist will do a physical exam and ask about your childs health history. The following tests may be done to find the underlying cause of CHF:  · Chest X-ray. This test takes a picture of the heart and lungs. The picture can show your childs heart size and shape. This picture also shows the fluid status of your child's lungs, which can be a clue to the heart's function.  · Electrocardiogram (ECG or EKG). During this test, the electrical activity of the heart is recorded to check for heart rhythm problems (arrhythmias) or problems with heart structure.  · Echocardiogram (echo). During this test, sound waves (ultrasound) are used to create a picture of the heart. This test can show problems with heart structure or function. This includes showing how well the heart pumps, if the heart is enlarged, the direction and strength of blood flow, or if there are any valve problems.  · Lab tests. For these tests, blood and urine samples are taken to check for problems in the kidneys or other organs.  How is congestive heart failure treated?  Specific treatment for your child depends on the cause of CHF. If the cause of CHF in your child is a congenital heart defect, a catheter or surgical procedure may be done to repair the defect.  · Medicines are often prescribed to help manage your childs symptoms. These can include:  ¨ Diuretics help rid the body of excess water. This reduces fluid in the lungs and may improve breathing. These are very important in helping manage fluid status in heart failure.  ¨ Digoxin helps the heart pump blood with more force. This improves the hearts performance.  ¨ ACE inhibitors make blood vessels relax and allow blood to flow more easily from the heart.   ¨ Angiotensin receptor blockers or ARBs are very similar to ACE inhibitors. They may be used in a child who can't take an ACE inhibitor.  ¨ Beta-blockers lower blood pressure and slow heart rate by altering  hormones that can damage the heart. Beta-blockers can also improve the hearts pumping action over time.  · Pacemaker. Some children with heart failure need an artificial pacemaker. The pacemaker may help when the heart is not pumping well because of a slow heartbeat.  · Cardiac resynchronization therapy. This uses a special type of pacemaker that paces both pumping chambers of the heart at the same time to coordinate contractions and improve the heart's function. This treatment may be used in some children with long-term heart failure.  · Heart transplant. This is when the diseased heart is replaced with a healthy heart from a donor. This is only an option for very serious disease. And, it often takes some time to find a suitable heart. In some cases, a child may be able to be helped with devices that help the heart pump while he or she waits for a transplant.  Your child may benefit from seeing a nutritionist to help with nutrition for growth problems and to help balance fluids. He or she may also participate in an exercise rehab program to help with the ability to be active.  What are the long-term concerns?  The outcome for a child with CHF depends on many factors, including the underlying heart problem. The cardiologist will discuss your childs condition, treatment options, and potential outcomes with you.  Date Last Reviewed: 3/6/2016  © 7460-9760 [employer]. [address] [place] [address], [place] [address]. All rights reserved. This information is not intended as a substitute for professional medical care. Always follow your healthcare professional's instructions.

## 2018-01-04 ENCOUNTER — CLINICAL SUPPORT (OUTPATIENT)
Dept: ELECTROPHYSIOLOGY | Facility: CLINIC | Age: 53
End: 2018-01-04
Payer: MEDICAID

## 2018-01-04 DIAGNOSIS — Z95.810 AICD (AUTOMATIC CARDIOVERTER/DEFIBRILLATOR) PRESENT: Primary | ICD-10-CM

## 2018-01-04 DIAGNOSIS — Z95.810 ICD (IMPLANTABLE CARDIOVERTER-DEFIBRILLATOR) IN PLACE: ICD-10-CM

## 2018-01-04 DIAGNOSIS — I42.8 NONISCHEMIC CARDIOMYOPATHY: ICD-10-CM

## 2018-01-04 DIAGNOSIS — I48.0 PAF (PAROXYSMAL ATRIAL FIBRILLATION): ICD-10-CM

## 2018-01-04 DIAGNOSIS — I50.9 CHF (CONGESTIVE HEART FAILURE): ICD-10-CM

## 2018-01-04 PROCEDURE — 93295 DEV INTERROG REMOTE 1/2/MLT: CPT | Mod: ,,, | Performed by: INTERNAL MEDICINE

## 2018-01-04 PROCEDURE — 93296 REM INTERROG EVL PM/IDS: CPT | Mod: PBBFAC | Performed by: INTERNAL MEDICINE

## 2018-01-10 RX ORDER — HYDRALAZINE HYDROCHLORIDE 25 MG/1
TABLET, FILM COATED ORAL
Qty: 60 TABLET | Refills: 0 | Status: SHIPPED | OUTPATIENT
Start: 2018-01-10 | End: 2018-08-13 | Stop reason: SINTOL

## 2018-01-23 PROBLEM — Z74.09 IMPAIRED FUNCTIONAL MOBILITY AND ACTIVITY TOLERANCE: Status: ACTIVE | Noted: 2018-01-23

## 2018-01-25 ENCOUNTER — TELEPHONE (OUTPATIENT)
Dept: ELECTROPHYSIOLOGY | Facility: CLINIC | Age: 53
End: 2018-01-25

## 2018-01-25 NOTE — TELEPHONE ENCOUNTER
----- Message from eRbecca Stack MA sent at 1/25/2018  3:40 PM CST -----  Contact: pt  Please see below  ----- Message -----  From: Diana Ritter  Sent: 1/25/2018   3:12 PM  To: Sukhwinder Rojas Staff    Pls call pt at [phone].  She has a defibrillator and was around her nephew who wears a hearing aid and they both heard some noises coming from her device.  She wants to know if this is normal or could something be wrong.    Thank you

## 2018-03-16 ENCOUNTER — HOSPITAL ENCOUNTER (INPATIENT)
Facility: HOSPITAL | Age: 53
LOS: 1 days | Discharge: HOME | DRG: 293 | End: 2018-03-18
Attending: EMERGENCY MEDICINE | Admitting: INTERNAL MEDICINE
Payer: MEDICAID

## 2018-03-16 DIAGNOSIS — I50.9 ACUTE EXACERBATION OF CHF (CONGESTIVE HEART FAILURE): ICD-10-CM

## 2018-03-16 DIAGNOSIS — I51.89 DIASTOLIC DYSFUNCTION: ICD-10-CM

## 2018-03-16 DIAGNOSIS — R79.89 ELEVATED TROPONIN: ICD-10-CM

## 2018-03-16 DIAGNOSIS — I50.21 REDUCED EJECTION FRACTION CONCURRENT WITH AND DUE TO ACUTE HEART FAILURE: ICD-10-CM

## 2018-03-16 DIAGNOSIS — I50.9 ACUTE ON CHRONIC CONGESTIVE HEART FAILURE, UNSPECIFIED CONGESTIVE HEART FAILURE TYPE: ICD-10-CM

## 2018-03-16 DIAGNOSIS — I50.9 HEART FAILURE: ICD-10-CM

## 2018-03-16 DIAGNOSIS — I51.7 LEFT VENTRICULAR HYPERTROPHY: ICD-10-CM

## 2018-03-16 DIAGNOSIS — R06.02 SHORTNESS OF BREATH: ICD-10-CM

## 2018-03-16 DIAGNOSIS — I50.43 ACUTE ON CHRONIC COMBINED SYSTOLIC AND DIASTOLIC CONGESTIVE HEART FAILURE: Primary | ICD-10-CM

## 2018-03-16 DIAGNOSIS — G47.33 OSA (OBSTRUCTIVE SLEEP APNEA): ICD-10-CM

## 2018-03-16 DIAGNOSIS — I50.9 HEART FAILURE, UNSPECIFIED HEART FAILURE CHRONICITY, UNSPECIFIED HEART FAILURE TYPE: ICD-10-CM

## 2018-03-16 LAB
ALBUMIN SERPL BCP-MCNC: 3.3 G/DL
ALP SERPL-CCNC: 54 U/L
ALT SERPL W/O P-5'-P-CCNC: 26 U/L
ANION GAP SERPL CALC-SCNC: 13 MMOL/L
ANISOCYTOSIS BLD QL SMEAR: SLIGHT
AST SERPL-CCNC: 37 U/L
BASOPHILS # BLD AUTO: 0.04 K/UL
BASOPHILS NFR BLD: 0.5 %
BILIRUB DIRECT SERPL-MCNC: 0.7 MG/DL
BILIRUB SERPL-MCNC: 3.1 MG/DL
BNP SERPL-MCNC: 2839 PG/ML
BUN SERPL-MCNC: 29 MG/DL
BURR CELLS BLD QL SMEAR: ABNORMAL
CALCIUM SERPL-MCNC: 9.1 MG/DL
CHLORIDE SERPL-SCNC: 107 MMOL/L
CO2 SERPL-SCNC: 23 MMOL/L
CREAT SERPL-MCNC: 1.2 MG/DL
DACRYOCYTES BLD QL SMEAR: ABNORMAL
DIFFERENTIAL METHOD: ABNORMAL
EOSINOPHIL # BLD AUTO: 0.1 K/UL
EOSINOPHIL NFR BLD: 0.6 %
ERYTHROCYTE [DISTWIDTH] IN BLOOD BY AUTOMATED COUNT: 29.2 %
EST. GFR  (AFRICAN AMERICAN): >60 ML/MIN/1.73 M^2
EST. GFR  (NON AFRICAN AMERICAN): 52 ML/MIN/1.73 M^2
GLUCOSE SERPL-MCNC: 116 MG/DL
HCT VFR BLD AUTO: 33 %
HGB BLD-MCNC: 9.4 G/DL
HYPOCHROMIA BLD QL SMEAR: ABNORMAL
LACTATE SERPL-SCNC: 1.3 MMOL/L
LYMPHOCYTES # BLD AUTO: 2.9 K/UL
LYMPHOCYTES NFR BLD: 35.3 %
MCH RBC QN AUTO: 16.8 PG
MCHC RBC AUTO-ENTMCNC: 28.5 G/DL
MCV RBC AUTO: 59 FL
MONOCYTES # BLD AUTO: 0.5 K/UL
MONOCYTES NFR BLD: 6.1 %
NEUTROPHILS # BLD AUTO: 4.7 K/UL
NEUTROPHILS NFR BLD: 57.5 %
OVALOCYTES BLD QL SMEAR: ABNORMAL
PLATELET # BLD AUTO: 269 K/UL
PLATELET BLD QL SMEAR: ABNORMAL
PMV BLD AUTO: ABNORMAL FL
POIKILOCYTOSIS BLD QL SMEAR: ABNORMAL
POLYCHROMASIA BLD QL SMEAR: ABNORMAL
POTASSIUM SERPL-SCNC: 4.5 MMOL/L
PROT SERPL-MCNC: 6.5 G/DL
RBC # BLD AUTO: 5.58 M/UL
SCHISTOCYTES BLD QL SMEAR: ABNORMAL
SODIUM SERPL-SCNC: 143 MMOL/L
SPHEROCYTES BLD QL SMEAR: ABNORMAL
TARGETS BLD QL SMEAR: ABNORMAL
TROPONIN I SERPL DL<=0.01 NG/ML-MCNC: 0.51 NG/ML
WBC # BLD AUTO: 8.19 K/UL

## 2018-03-16 PROCEDURE — 94761 N-INVAS EAR/PLS OXIMETRY MLT: CPT

## 2018-03-16 PROCEDURE — 93005 ELECTROCARDIOGRAM TRACING: CPT

## 2018-03-16 PROCEDURE — 83880 ASSAY OF NATRIURETIC PEPTIDE: CPT

## 2018-03-16 PROCEDURE — 11000001 HC ACUTE MED/SURG PRIVATE ROOM

## 2018-03-16 PROCEDURE — G0378 HOSPITAL OBSERVATION PER HR: HCPCS

## 2018-03-16 PROCEDURE — 80076 HEPATIC FUNCTION PANEL: CPT

## 2018-03-16 PROCEDURE — 93010 ELECTROCARDIOGRAM REPORT: CPT | Mod: ,,, | Performed by: INTERNAL MEDICINE

## 2018-03-16 PROCEDURE — 99285 EMERGENCY DEPT VISIT HI MDM: CPT | Mod: 25

## 2018-03-16 PROCEDURE — 96375 TX/PRO/DX INJ NEW DRUG ADDON: CPT

## 2018-03-16 PROCEDURE — 83605 ASSAY OF LACTIC ACID: CPT

## 2018-03-16 PROCEDURE — 84484 ASSAY OF TROPONIN QUANT: CPT

## 2018-03-16 PROCEDURE — 25000003 PHARM REV CODE 250: Performed by: EMERGENCY MEDICINE

## 2018-03-16 PROCEDURE — 85025 COMPLETE CBC W/AUTO DIFF WBC: CPT

## 2018-03-16 PROCEDURE — 27000221 HC OXYGEN, UP TO 24 HOURS

## 2018-03-16 PROCEDURE — 96374 THER/PROPH/DIAG INJ IV PUSH: CPT

## 2018-03-16 PROCEDURE — 84484 ASSAY OF TROPONIN QUANT: CPT | Mod: 91

## 2018-03-16 PROCEDURE — 25000242 PHARM REV CODE 250 ALT 637 W/ HCPCS: Performed by: EMERGENCY MEDICINE

## 2018-03-16 PROCEDURE — 94644 CONT INHLJ TX 1ST HOUR: CPT

## 2018-03-16 PROCEDURE — 63600175 PHARM REV CODE 636 W HCPCS: Performed by: EMERGENCY MEDICINE

## 2018-03-16 PROCEDURE — 80048 BASIC METABOLIC PNL TOTAL CA: CPT

## 2018-03-16 PROCEDURE — 25000003 PHARM REV CODE 250: Performed by: FAMILY MEDICINE

## 2018-03-16 PROCEDURE — 94640 AIRWAY INHALATION TREATMENT: CPT

## 2018-03-16 RX ORDER — ISOSORBIDE DINITRATE 20 MG/1
20 TABLET ORAL 2 TIMES DAILY
Status: DISCONTINUED | OUTPATIENT
Start: 2018-03-16 | End: 2018-03-18 | Stop reason: HOSPADM

## 2018-03-16 RX ORDER — DEXAMETHASONE SODIUM PHOSPHATE 4 MG/ML
8 INJECTION, SOLUTION INTRA-ARTICULAR; INTRALESIONAL; INTRAMUSCULAR; INTRAVENOUS; SOFT TISSUE
Status: COMPLETED | OUTPATIENT
Start: 2018-03-16 | End: 2018-03-16

## 2018-03-16 RX ORDER — FUROSEMIDE 10 MG/ML
80 INJECTION INTRAMUSCULAR; INTRAVENOUS
Status: COMPLETED | OUTPATIENT
Start: 2018-03-16 | End: 2018-03-16

## 2018-03-16 RX ORDER — PANTOPRAZOLE SODIUM 40 MG/1
40 TABLET, DELAYED RELEASE ORAL DAILY
Status: DISCONTINUED | OUTPATIENT
Start: 2018-03-17 | End: 2018-03-18 | Stop reason: HOSPADM

## 2018-03-16 RX ORDER — ATORVASTATIN CALCIUM 10 MG/1
10 TABLET, FILM COATED ORAL DAILY
Status: DISCONTINUED | OUTPATIENT
Start: 2018-03-17 | End: 2018-03-17

## 2018-03-16 RX ORDER — HYDRALAZINE HYDROCHLORIDE 25 MG/1
25 TABLET, FILM COATED ORAL 2 TIMES DAILY
Status: DISCONTINUED | OUTPATIENT
Start: 2018-03-16 | End: 2018-03-17

## 2018-03-16 RX ORDER — FUROSEMIDE 10 MG/ML
80 INJECTION INTRAMUSCULAR; INTRAVENOUS 2 TIMES DAILY
Status: DISCONTINUED | OUTPATIENT
Start: 2018-03-17 | End: 2018-03-18 | Stop reason: HOSPADM

## 2018-03-16 RX ORDER — ALBUTEROL SULFATE 90 UG/1
2 AEROSOL, METERED RESPIRATORY (INHALATION) 2 TIMES DAILY
Status: DISCONTINUED | OUTPATIENT
Start: 2018-03-16 | End: 2018-03-18 | Stop reason: HOSPADM

## 2018-03-16 RX ORDER — IPRATROPIUM BROMIDE AND ALBUTEROL SULFATE 2.5; .5 MG/3ML; MG/3ML
3 SOLUTION RESPIRATORY (INHALATION)
Status: COMPLETED | OUTPATIENT
Start: 2018-03-16 | End: 2018-03-16

## 2018-03-16 RX ORDER — SODIUM CHLORIDE 0.9 % (FLUSH) 0.9 %
3 SYRINGE (ML) INJECTION EVERY 8 HOURS
Status: DISCONTINUED | OUTPATIENT
Start: 2018-03-17 | End: 2018-03-18 | Stop reason: HOSPADM

## 2018-03-16 RX ORDER — IPRATROPIUM BROMIDE AND ALBUTEROL SULFATE 2.5; .5 MG/3ML; MG/3ML
1 SOLUTION RESPIRATORY (INHALATION) 2 TIMES DAILY
Status: DISCONTINUED | OUTPATIENT
Start: 2018-03-16 | End: 2018-03-18 | Stop reason: HOSPADM

## 2018-03-16 RX ORDER — ASPIRIN 325 MG
325 TABLET ORAL
Status: COMPLETED | OUTPATIENT
Start: 2018-03-16 | End: 2018-03-16

## 2018-03-16 RX ADMIN — HYDRALAZINE HYDROCHLORIDE 25 MG: 25 TABLET, FILM COATED ORAL at 11:03

## 2018-03-16 RX ADMIN — ISOSORBIDE DINITRATE 20 MG: 20 TABLET ORAL at 11:03

## 2018-03-16 RX ADMIN — ASPIRIN 325 MG ORAL TABLET 325 MG: 325 PILL ORAL at 07:03

## 2018-03-16 RX ADMIN — IPRATROPIUM BROMIDE AND ALBUTEROL SULFATE 3 ML: .5; 3 SOLUTION RESPIRATORY (INHALATION) at 07:03

## 2018-03-16 RX ADMIN — DEXAMETHASONE SODIUM PHOSPHATE 8 MG: 4 INJECTION, SOLUTION INTRAMUSCULAR; INTRAVENOUS at 07:03

## 2018-03-16 RX ADMIN — FUROSEMIDE 80 MG: 10 INJECTION, SOLUTION INTRAMUSCULAR; INTRAVENOUS at 08:03

## 2018-03-16 RX ADMIN — NITROGLYCERIN 1 INCH: 20 OINTMENT TOPICAL at 07:03

## 2018-03-16 RX ADMIN — APIXABAN 5 MG: 5 TABLET, FILM COATED ORAL at 11:03

## 2018-03-16 NOTE — ED PROVIDER NOTES
"Encounter Date: 3/16/2018    SCRIBE #1 NOTE: I, Georgia Galicia, am scribing for, and in the presence of, Dr. Neri.       History     Chief Complaint   Patient presents with    Shortness of Breath     pt c/o SOB with prodoctuve cough x 1 week      52 y.o. female with PMHx of COPD, CHF, sleep apnea and HTN who presents to the ED with a complaint of chronic intermittent SOB. Pt states she has tried nebulizer breathing treatment at home as well as her overnight CPAP with minimal relief of symptoms. Her SOB worsened again a few days ago, and she has felt progressively more SOB since then. She reports it is worse with exertion and lying supine. Other than the neb treatments, no other exacerbating or alleviating factors reported.  She reports she also has been taking steroids for the last few days which somewhat alleviate her symptoms.  Dosage of steriod is unknown.  Pt also reports she has been having a cough with occasional scant green/yellow sputum for a month and states that sometimes she has too much phlegm in her throat that it makes her choke/gag or vomit. She also reports she has been taking extra doses of her lasix as directed by her physician without relief. Reports she feels full very quickly - eats a few bites or drinks a few sips of water and feels very full. Does not report any chest pain at this time. Denies fever.       The history is provided by the patient.     Review of patient's allergies indicates:   Allergen Reactions    Penicillins Hives    Spironolactone Other (See Comments)     Causes headaches    Iodinated contrast- oral and iv dye Nausea And Vomiting    Percocet [oxycodone-acetaminophen] Other (See Comments)     Nausea, Dizziness, Anxiety.  "I don't like how it makes me feel."   Given Hydromorphone 0.5mg IVP  Without problems.    Diovan hct [valsartan-hydrochlorothiazide] Other (See Comments)     Causes coughing    Tramadol Nausea And Vomiting     Past Medical History:   Diagnosis Date    " Anemia     Anticoagulant long-term use     Arthritis     Atrial fibrillation     Congestive heart failure     COPD (chronic obstructive pulmonary disease)     Encounter for blood transfusion     GERD (gastroesophageal reflux disease)     Hypertension     Right kidney mass     Sleep apnea     Thyroid disease      Past Surgical History:   Procedure Laterality Date    APPENDECTOMY      CARDIAC DEFIBRILLATOR PLACEMENT Left 12/2016    CARDIAC DEFIBRILLATOR PLACEMENT  2016    EYE SURGERY      FRACTURE SURGERY Left     hand 5th digit    HYSTERECTOMY      left knee mass removal Left      History reviewed. No pertinent family history.  Social History   Substance Use Topics    Smoking status: Never Smoker    Smokeless tobacco: Never Used    Alcohol use No     Review of Systems   Constitutional: Negative for chills, fatigue and fever.   HENT: Negative for congestion, sore throat and voice change.    Eyes: Negative for photophobia, pain and redness.   Respiratory: Positive for cough (with green sputum) and shortness of breath (chronic). Negative for choking.    Cardiovascular: Negative for chest pain, palpitations and leg swelling.   Gastrointestinal: Negative for abdominal pain, diarrhea, nausea and vomiting.   Genitourinary: Negative for dysuria, frequency and urgency.   Musculoskeletal: Negative for back pain, neck pain and neck stiffness.   Skin: Negative for rash.   Neurological: Negative for seizures, light-headedness, numbness and headaches. Weakness: generalized.   All other systems reviewed and are negative.      Physical Exam     Initial Vitals [03/16/18 1754]   BP Pulse Resp Temp SpO2   (!) 175/94 95 (!) 24 98.2 °F (36.8 °C) (!) 93 %      MAP       121         Physical Exam    Nursing note and vitals reviewed.  Constitutional: She appears well-developed and well-nourished. She is Obese . She appears distressed (Uncomfortable).   Obese   HENT:   Head: Normocephalic and atraumatic.   Mouth/Throat:  Mucous membranes are dry.   Oropharynx clear   Eyes: Conjunctivae and EOM are normal. Pupils are equal, round, and reactive to light.   Neck: Normal range of motion. Neck supple. No tracheal deviation present.   Cardiovascular: Normal rate, regular rhythm, normal heart sounds and intact distal pulses.   Pulmonary/Chest: She is in respiratory distress (Mild). She has no wheezes. She has no rhonchi. She has rales.   Bibasilar rales.  No increased effort of breathing noted.    Abdominal: Soft. Bowel sounds are normal. She exhibits no distension. There is no tenderness.   Musculoskeletal: Normal range of motion. She exhibits no edema or tenderness.   Neurological: She is alert and oriented to person, place, and time. She has normal strength. No cranial nerve deficit or sensory deficit.   Skin: Skin is warm and dry. Capillary refill takes less than 2 seconds.         ED Course   Critical Care  Date/Time: 3/16/2018 8:08 PM  Performed by: RONALD FLEMING  Authorized by: RONALD FLEMING   Direct patient critical care time: 15 minutes  Additional history critical care time: 15 minutes  Ordering / reviewing critical care time: 15 minutes  Documentation critical care time: 15 minutes  Consulting other physicians critical care time: 15 minutes  Consult with family critical care time: 10 minutes  Total critical care time (exclusive of procedural time) : 85 minutes  Critical care time was exclusive of separately billable procedures and treating other patients.  Critical care was necessary to treat or prevent imminent or life-threatening deterioration of the following conditions: cardiac failure and respiratory failure.  Critical care was time spent personally by me on the following activities: discussions with consultants, interpretation of cardiac output measurements, evaluation of patient's response to treatment, examination of patient, obtaining history from patient or surrogate, ordering and performing treatments and  interventions, ordering and review of laboratory studies, ordering and review of radiographic studies, pulse oximetry, re-evaluation of patient's condition and review of old charts.        Labs Reviewed   CBC W/ AUTO DIFFERENTIAL - Abnormal; Notable for the following:        Result Value    RBC 5.58 (*)     Hemoglobin 9.4 (*)     Hematocrit 33.0 (*)     MCV 59 (*)     MCH 16.8 (*)     MCHC 28.5 (*)     RDW 29.2 (*)     All other components within normal limits   BASIC METABOLIC PANEL - Abnormal; Notable for the following:     Glucose 116 (*)     BUN, Bld 29 (*)     eGFR if non  52 (*)     All other components within normal limits   TROPONIN I - Abnormal; Notable for the following:     Troponin I 0.507 (*)     All other components within normal limits   HEPATIC FUNCTION PANEL - Abnormal; Notable for the following:     Albumin 3.3 (*)     Total Bilirubin 3.1 (*)     Bilirubin, Direct 0.7 (*)     Alkaline Phosphatase 54 (*)     All other components within normal limits   B-TYPE NATRIURETIC PEPTIDE - Abnormal; Notable for the following:     BNP 2,839 (*)     All other components within normal limits   LACTIC ACID, PLASMA   HEPATIC FUNCTION PANEL   B-TYPE NATRIURETIC PEPTIDE     EKG Readings: (Independently Interpreted)   Initial Reading: No STEMI. Previous EKG: Compared with most recent EKG Previous EKG Date: 12/20/17 (minimal change) . Rhythm: Normal Sinus Rhythm. Heart Rate: 87. Ectopy: No Ectopy. Conduction: LVH with repolarization abnormality. ST Segments: Normal ST Segments. T Waves: Normal. Axis: Normal.       X-Rays: Other Radiology Reports: I have visualized all imaging for this patient, radiology has done the interpretation   Independently Interpreted Readings:   Chest X-Ray: CXR Interpreted by radiologist and visualized by me:      Imaging Results          X-Ray Chest AP Portable (Final result)  Result time 03/16/18 19:43:31    Final result by Keren Miguel MD (03/16/18 19:43:31)                  Impression:    As above.  Electronically signed by: Keren Miguel MD  Date:    03/16/2018  Time:    19:43             Narrative:    EXAMINATION:  XR CHEST AP PORTABLE  CLINICAL HISTORY:  Shortness of breath  TECHNIQUE:  Single frontal view of the chest was performed.  COMPARISON:  None  FINDINGS:  There is a left-sided cardiac pacing device in place.  The cardiomediastinal silhouette is enlarged, similar to prior examinations.  Please note of the lung apices are slightly obscured by the patient's chin.  There is pulmonary vascular congestion and increased interstitial and parenchymal attenuation suggestive of pulmonary edema/CHF.  There is persistent increased opacity in the left lower lung zone possibly representing component of pleural fluid as well as atelectasis/consolidation.  There is no evidence of pneumothorax.  Visualized osseous structures demonstrate no acute abnormality.                              Medical Decision Making:   History:   Old Medical Records: I decided to obtain old medical records.  Initial Assessment:   52 y.o. female with a hx of COPD, CHF, and sleep apnea presents to the ED with worsening SOB and cough for the past month.  On exam, pt has bibasilar rales.  No wheezes or rhonchi.  No increased effort of breathing noted.   Differential Diagnosis:   Pulmonary infectious process, COPD, asthma, pulmonary embolus and congestive heart failure.  Independently Interpreted Test(s):   I have ordered and independently interpreted X-rays - see prior notes.  I have ordered and independently interpreted EKG Reading(s) - see prior notes  Clinical Tests:   Lab Tests: Reviewed       <> Summary of Lab: Concerning for elevated trop, BNP  ED Management:  Patient given Nitropaste, aspirin, neb treatment, Decadron, IV Lasix.  Given her lab work, I discussed the case with Dr. Moreno, who will see the patient is a consultant should the family medicine team need him.  I discussed case with the [facility] family  medicine team who will see and admit the patient.  Informed patient of plan and she is comfortable with plan at this time.                      Clinical Impression:   The primary encounter diagnosis was Acute on chronic combined systolic and diastolic congestive heart failure. Diagnoses of Shortness of breath and Elevated troponin were also pertinent to this visit.    Disposition:   Disposition: Placed in Observation  Condition: Stable                        Catalino Neri MD  03/16/18 2012

## 2018-03-16 NOTE — ED NOTES
Dr. Hayden in with pt, states pt needs to be moved to other er area with central monitoring due to pt's condition.

## 2018-03-16 NOTE — ED TRIAGE NOTES
Pt states over the past month has been seen by pcp and at [facility] for shortness of breath, cough with congestion, fever, body aches, and chills. Pt states she was told she has asthma and bronchitis, but symptoms are worsening. Pt comes in with severe shortness of breath, productive cough, and difficulty breathing. Noted labored breathing. Pt very emotional about situation, states she is unable to do things at home that she normally does due to shortness of breath. Pt states she has been told repeatedly that this is due to chf. Pt states has increased swelling in lower ext.

## 2018-03-17 LAB
ALBUMIN SERPL BCP-MCNC: 3.4 G/DL
ALP SERPL-CCNC: 54 U/L
ALT SERPL W/O P-5'-P-CCNC: 27 U/L
ANION GAP SERPL CALC-SCNC: 12 MMOL/L
ANISOCYTOSIS BLD QL SMEAR: ABNORMAL
AST SERPL-CCNC: 22 U/L
BASOPHILS # BLD AUTO: 0.02 K/UL
BASOPHILS NFR BLD: 0.3 %
BILIRUB SERPL-MCNC: 2.8 MG/DL
BUN SERPL-MCNC: 29 MG/DL
BURR CELLS BLD QL SMEAR: ABNORMAL
CALCIUM SERPL-MCNC: 9 MG/DL
CHLORIDE SERPL-SCNC: 105 MMOL/L
CO2 SERPL-SCNC: 25 MMOL/L
CREAT SERPL-MCNC: 1.2 MG/DL
DACRYOCYTES BLD QL SMEAR: ABNORMAL
DIFFERENTIAL METHOD: ABNORMAL
EOSINOPHIL # BLD AUTO: 0 K/UL
EOSINOPHIL NFR BLD: 0.1 %
ERYTHROCYTE [DISTWIDTH] IN BLOOD BY AUTOMATED COUNT: 28.7 %
EST. GFR  (AFRICAN AMERICAN): >60 ML/MIN/1.73 M^2
EST. GFR  (NON AFRICAN AMERICAN): 52 ML/MIN/1.73 M^2
GLUCOSE SERPL-MCNC: 157 MG/DL
HCT VFR BLD AUTO: 32.6 %
HGB BLD-MCNC: 9.4 G/DL
HYPOCHROMIA BLD QL SMEAR: ABNORMAL
LYMPHOCYTES # BLD AUTO: 0.8 K/UL
LYMPHOCYTES NFR BLD: 11.1 %
MAGNESIUM SERPL-MCNC: 1.8 MG/DL
MCH RBC QN AUTO: 16.9 PG
MCHC RBC AUTO-ENTMCNC: 28.8 G/DL
MCV RBC AUTO: 59 FL
MONOCYTES # BLD AUTO: 0.2 K/UL
MONOCYTES NFR BLD: 2.6 %
NEUTROPHILS # BLD AUTO: 6.3 K/UL
NEUTROPHILS NFR BLD: 85.9 %
OVALOCYTES BLD QL SMEAR: ABNORMAL
PHOSPHATE SERPL-MCNC: 5.1 MG/DL
PLATELET # BLD AUTO: 254 K/UL
PLATELET BLD QL SMEAR: ABNORMAL
PMV BLD AUTO: ABNORMAL FL
POIKILOCYTOSIS BLD QL SMEAR: ABNORMAL
POLYCHROMASIA BLD QL SMEAR: ABNORMAL
POTASSIUM SERPL-SCNC: 4 MMOL/L
PROT SERPL-MCNC: 6.3 G/DL
RBC # BLD AUTO: 5.57 M/UL
SODIUM SERPL-SCNC: 142 MMOL/L
TARGETS BLD QL SMEAR: ABNORMAL
TROPONIN I SERPL DL<=0.01 NG/ML-MCNC: 0.49 NG/ML
TROPONIN I SERPL DL<=0.01 NG/ML-MCNC: 0.49 NG/ML
TROPONIN I SERPL DL<=0.01 NG/ML-MCNC: 0.55 NG/ML
WBC # BLD AUTO: 7.4 K/UL

## 2018-03-17 PROCEDURE — 25000003 PHARM REV CODE 250: Performed by: STUDENT IN AN ORGANIZED HEALTH CARE EDUCATION/TRAINING PROGRAM

## 2018-03-17 PROCEDURE — 93306 TTE W/DOPPLER COMPLETE: CPT | Mod: 26,,, | Performed by: INTERNAL MEDICINE

## 2018-03-17 PROCEDURE — 63600175 PHARM REV CODE 636 W HCPCS: Performed by: FAMILY MEDICINE

## 2018-03-17 PROCEDURE — 94640 AIRWAY INHALATION TREATMENT: CPT

## 2018-03-17 PROCEDURE — A4216 STERILE WATER/SALINE, 10 ML: HCPCS | Performed by: FAMILY MEDICINE

## 2018-03-17 PROCEDURE — 93005 ELECTROCARDIOGRAM TRACING: CPT

## 2018-03-17 PROCEDURE — 84100 ASSAY OF PHOSPHORUS: CPT

## 2018-03-17 PROCEDURE — 93010 ELECTROCARDIOGRAM REPORT: CPT | Mod: ,,, | Performed by: INTERNAL MEDICINE

## 2018-03-17 PROCEDURE — 80053 COMPREHEN METABOLIC PANEL: CPT

## 2018-03-17 PROCEDURE — 36415 COLL VENOUS BLD VENIPUNCTURE: CPT

## 2018-03-17 PROCEDURE — 94761 N-INVAS EAR/PLS OXIMETRY MLT: CPT

## 2018-03-17 PROCEDURE — 27000221 HC OXYGEN, UP TO 24 HOURS

## 2018-03-17 PROCEDURE — 93010 ELECTROCARDIOGRAM REPORT: CPT | Mod: 76,,, | Performed by: INTERNAL MEDICINE

## 2018-03-17 PROCEDURE — 84484 ASSAY OF TROPONIN QUANT: CPT | Mod: 91

## 2018-03-17 PROCEDURE — 83735 ASSAY OF MAGNESIUM: CPT

## 2018-03-17 PROCEDURE — 25000242 PHARM REV CODE 250 ALT 637 W/ HCPCS: Performed by: FAMILY MEDICINE

## 2018-03-17 PROCEDURE — 11000001 HC ACUTE MED/SURG PRIVATE ROOM

## 2018-03-17 PROCEDURE — 25000003 PHARM REV CODE 250: Performed by: FAMILY MEDICINE

## 2018-03-17 PROCEDURE — 93306 TTE W/DOPPLER COMPLETE: CPT

## 2018-03-17 RX ORDER — ACETAMINOPHEN 325 MG/1
650 TABLET ORAL ONCE
Status: COMPLETED | OUTPATIENT
Start: 2018-03-17 | End: 2018-03-17

## 2018-03-17 RX ORDER — HYDRALAZINE HYDROCHLORIDE 25 MG/1
25 TABLET, FILM COATED ORAL EVERY 8 HOURS
Status: DISCONTINUED | OUTPATIENT
Start: 2018-03-17 | End: 2018-03-18 | Stop reason: HOSPADM

## 2018-03-17 RX ORDER — SPIRONOLACTONE 25 MG/1
25 TABLET ORAL DAILY
Status: DISCONTINUED | OUTPATIENT
Start: 2018-03-17 | End: 2018-03-18 | Stop reason: HOSPADM

## 2018-03-17 RX ORDER — CARVEDILOL 25 MG/1
25 TABLET ORAL 2 TIMES DAILY
Status: DISCONTINUED | OUTPATIENT
Start: 2018-03-17 | End: 2018-03-18 | Stop reason: HOSPADM

## 2018-03-17 RX ORDER — ATORVASTATIN CALCIUM 40 MG/1
40 TABLET, FILM COATED ORAL DAILY
Status: DISCONTINUED | OUTPATIENT
Start: 2018-03-18 | End: 2018-03-18 | Stop reason: HOSPADM

## 2018-03-17 RX ADMIN — HYDRALAZINE HYDROCHLORIDE 25 MG: 25 TABLET, FILM COATED ORAL at 08:03

## 2018-03-17 RX ADMIN — IPRATROPIUM BROMIDE AND ALBUTEROL SULFATE 1 ML: .5; 3 SOLUTION RESPIRATORY (INHALATION) at 08:03

## 2018-03-17 RX ADMIN — ALBUTEROL SULFATE 2 PUFF: 90 AEROSOL, METERED RESPIRATORY (INHALATION) at 01:03

## 2018-03-17 RX ADMIN — SODIUM CHLORIDE, PRESERVATIVE FREE 3 ML: 5 INJECTION INTRAVENOUS at 09:03

## 2018-03-17 RX ADMIN — FUROSEMIDE 80 MG: 10 INJECTION, SOLUTION INTRAMUSCULAR; INTRAVENOUS at 05:03

## 2018-03-17 RX ADMIN — ATORVASTATIN CALCIUM 10 MG: 10 TABLET, FILM COATED ORAL at 08:03

## 2018-03-17 RX ADMIN — ACETAMINOPHEN 650 MG: 325 TABLET ORAL at 01:03

## 2018-03-17 RX ADMIN — PANTOPRAZOLE SODIUM 40 MG: 40 TABLET, DELAYED RELEASE ORAL at 08:03

## 2018-03-17 RX ADMIN — ALBUTEROL SULFATE 2 PUFF: 90 AEROSOL, METERED RESPIRATORY (INHALATION) at 08:03

## 2018-03-17 RX ADMIN — ISOSORBIDE DINITRATE 20 MG: 20 TABLET ORAL at 08:03

## 2018-03-17 RX ADMIN — SODIUM CHLORIDE, PRESERVATIVE FREE 3 ML: 5 INJECTION INTRAVENOUS at 02:03

## 2018-03-17 RX ADMIN — HYDRALAZINE HYDROCHLORIDE 25 MG: 25 TABLET, FILM COATED ORAL at 09:03

## 2018-03-17 RX ADMIN — CARVEDILOL 25 MG: 25 TABLET, FILM COATED ORAL at 08:03

## 2018-03-17 RX ADMIN — APIXABAN 5 MG: 5 TABLET, FILM COATED ORAL at 08:03

## 2018-03-17 RX ADMIN — FUROSEMIDE 80 MG: 10 INJECTION, SOLUTION INTRAMUSCULAR; INTRAVENOUS at 08:03

## 2018-03-17 NOTE — ED NOTES
Pt endorses worsening SOB and CP today, per pt noticed the chest pain radiating down L arm and L neck. Unable to sleep without sitting straight up. meds given as ordered. Will continue to monitor pt.

## 2018-03-17 NOTE — NURSING
"Pt B/P 185/95, HR 75. Pt stated that one of the MD rounded few minutes ago put her back on spirolactone.  She states " the medicine giving her a headache." Notified Dr. Tamez and aware of the BP. MD states will review pt chart. Will continue to monitor.  "

## 2018-03-17 NOTE — PLAN OF CARE
Problem: Patient Care Overview  Goal: Plan of Care Review  Outcome: Ongoing (interventions implemented as appropriate)  Patient on oxygen with documented flow.  Will attempt to wean per O2 order protocol. Patient given aerosol treatment with no adverse reactions noted.  Patient instructed on proper use. Will continue to monitor.

## 2018-03-17 NOTE — PLAN OF CARE
Nurse informed Dr. Grayson of the patient's current b/p 161/96 even after receiving scheduled hydralazine

## 2018-03-17 NOTE — PROGRESS NOTES
Progress Note  [facility]  Admit Date: 3/16/2018   LOS: 0 days   SUBJECTIVE:   Follow-up For: SOB    Patient seen and examined this AM. She says that SOB has mildly improved and she has had good urine output.     ROS  Denies CP/N/V/F/C.   OBJECTIVE:   Vital Signs (Most Recent)  Temp: 97.9 °F (36.6 °C) (03/17/18 1616)  Pulse: 78 (03/17/18 1616)  Resp: 20 (03/17/18 1616)  BP: (!) 158/95 (03/17/18 1616)  SpO2: 97 % (03/17/18 1517)    I & O (Last 24H):  Intake/Output Summary (Last 24 hours) at 03/17/18 1628  Last data filed at 03/17/18 1300   Gross per 24 hour   Intake              615 ml   Output             1550 ml   Net             -935 ml     Wt Readings from Last 3 Encounters:   03/17/18 92.9 kg (204 lb 12.9 oz)   02/11/18 91.2 kg (201 lb)   02/05/18 89.8 kg (198 lb)       Current Diet Order   Procedures    Diet Cardiac        Physical Exam  General: well developed, well nourished  HENT: Head:normocephalic, atraumatic.   Neck: supple, symmetrical, trachea midline, no JVD  Lungs:  normal respiratory effort and mild crackles in the lower bases  Cardiovascular: Heart: regular rate and rhythm, S1, S2 normal, no murmur, click, rub or gallop. Chest Wall: no tenderness. Extremities: no cyanosis or edema, or clubbing. Pulses: 2+ and symmetric.  Abdomen: Distented; bowel sounds normal; no masses,  no organomegaly.   Skin: Skin color, texture, turgor normal. No rashes or lesions  Musculoskeletal:no clubbing, cyanosis  Neurologic: Normal strength and tone. No focal numbness or weakness    Laboratory Data:  CBC    Recent Labs  Lab 03/16/18 1827 03/17/18  0312   WBC 8.19 7.40   RBC 5.58* 5.57*   HGB 9.4* 9.4*   HCT 33.0* 32.6*    254   MCV 59* 59*   MCH 16.8* 16.9*   MCHC 28.5* 28.8*     CMP    Recent Labs  Lab 03/16/18 1827 03/17/18  0312   CALCIUM 9.1 9.0   PROT 6.5 6.3    142   K 4.5 4.0   CO2 23 25    105   BUN 29* 29*   CREATININE 1.2 1.2   ALKPHOS 54* 54*   ALT 26 27   AST 37 22   BILITOT  3.1* 2.8*     POCT-Glucose  No results found for: POCTGLUCOSE  COAGS  No results for input(s): PT, INR, APTT in the last 168 hours.  UA  No results for input(s): COLORU, CLARITYU, SPECGRAV, PHUR, PROTEINUA, GLUCOSEU, BLOODU, WBCU, RBCU, BACTERIA, MUCUS in the last 24 hours.    Invalid input(s):  BILIRUBINCON  MICRO  Microbiology Results (last 7 days)     ** No results found for the last 168 hours. **        LIPID PANEL  Lab Results   Component Value Date    CHOL 165 12/07/2017     Lab Results   Component Value Date    HDL 54 12/07/2017     Lab Results   Component Value Date    LDLCALC 77.8 12/07/2017     Lab Results   Component Value Date    TRIG 166 (H) 12/07/2017     Lab Results   Component Value Date    CHOLHDL 32.7 12/07/2017       ASSESSMENT/PLAN:   52 y.o.female has a past medical history of Anemia; Anticoagulant long-term use; Arthritis; Atrial fibrillation; Congestive heart failure; COPD (chronic obstructive pulmonary disease); Encounter for blood transfusion; GERD (gastroesophageal reflux disease); Hypertension; Right kidney mass; Sleep apnea; and Thyroid disease. here for worsening SOB:      Acute on Chronic CHF:   - Pt presented with worsening SOB, BNP 2839, CXR with pulmonary congestion suggesting CHF. EKG with LVH and Left atrial enlargement.   - Last echo 12/2017: EF 20 % with mild MVR and small pericardial effusion  - Repeat echo today.   - Trops trended down and EKG: no changes  -  S/p 80 mg IV lasix in the Ed  - Continue IV lasix 80 BID  - Strict fluid intake  - strict I/O's : net - 935  - Continue ASA, statin, hydralazine and isosorbide and coreg and lasix     Hx of COPD   - No wheezing in exam, CXR is hyperinflated.  - duo-nebs ordered as needed.      Elevated Trop:  - Initial trop was elevated, most likely due to heart strain 2/2 CHF  - Trops trended down and EKG no changes     HTN:   - Continue hydralazine, coreg, isordil  - BP is on the high side. Hydralazine IV added as needed for SBP  >165     A. Fib:   - Currently NSR  - Apixaban restarted.   - Continue Coreg.      Cariomyopathy, nonischemic:   - s/P ICD for primary prevention   -normal coronary arteries per [facility] in 01/2015; nuclear stress test today with fixed defect and no reversible ischemia  - Echo in AM.         PPx: PPi and Apixaban    Dispo: Improvement in SOB and wean off of oxygen, Amb pulse ox.      Case discussed with the attending.        3/17/2018 Praveen Grayson MD

## 2018-03-17 NOTE — H&P
"  History & Physical  [facility]      SUBJECTIVE:     History of Present Illness:  52 y.o. F with HFrEF (EF 20) with diastolic dysfunction s/p pacemaker, CAD, HTN, AFib, MELISSA, presented to the ER with worsening SOB. It started few days ago and is progressively getting worse. She also complaints of chest tightness. She tried using her inhalers and CPAP overnight to help her with her symptoms but no  Relief. She also reports cough with green/yellow sputum. She denies any fever, chills, cold like symptoms and bodyaches. She also reports feeling full quickly with small bites or drinks. She has been compliant with her meds and has stopped taking any salt in her diet. She denies any changes in her lifestyle, diet or travel. She did take few extra doses of her lasix as she was advised by her PCP if the symptoms worsen.       Review of patient's allergies indicates:   Allergen Reactions    Penicillins Hives    Spironolactone Other (See Comments)     Causes headaches    Iodinated contrast- oral and iv dye Nausea And Vomiting    Percocet [oxycodone-acetaminophen] Other (See Comments)     Nausea, Dizziness, Anxiety.  "I don't like how it makes me feel."   Given Hydromorphone 0.5mg IVP  Without problems.    Diovan hct [valsartan-hydrochlorothiazide] Other (See Comments)     Causes coughing    Tramadol Nausea And Vomiting       Past Medical History:   Diagnosis Date    Anemia     Anticoagulant long-term use     Arthritis     Atrial fibrillation     Congestive heart failure     COPD (chronic obstructive pulmonary disease)     Encounter for blood transfusion     GERD (gastroesophageal reflux disease)     Hypertension     Right kidney mass     Sleep apnea     Thyroid disease      Past Surgical History:   Procedure Laterality Date    APPENDECTOMY      CARDIAC DEFIBRILLATOR PLACEMENT Left 12/2016    CARDIAC DEFIBRILLATOR PLACEMENT  2016    EYE SURGERY      FRACTURE SURGERY Left     hand 5th digit    " HYSTERECTOMY      left knee mass removal Left      History reviewed. No pertinent family history.  Social History   Substance Use Topics    Smoking status: Never Smoker    Smokeless tobacco: Never Used    Alcohol use No        Review of Systems:  As per Subjective  Constitutional: no fever or chills  Respiratory: positive for cough, dyspnea on exertion and sputum  Cardiovascular: no chest pain or palpitations  Gastrointestinal: no nausea or vomiting, no abdominal pain or change in bowel habits  Musculoskeletal: no arthralgias or myalgias  Neurological: no seizures or tremors     OBJECTIVE:     Vital Signs (Most Recent)  Temp: 98.2 °F (36.8 °C) (03/16/18 1754)  Pulse: 88 (03/16/18 1931)  Resp: 20 (03/16/18 1931)  BP: (!) 165/119 (03/16/18 1931)  SpO2: 97 % (03/16/18 1931)    Physical Exam:  General: well developed, well nourished  HENT: Head:normocephalic, atraumatic.   Neck: supple, symmetrical, trachea midline, no JVD  Lungs:  normal respiratory effort and mild crackles in the lower bases  Cardiovascular: Heart: regular rate and rhythm, S1, S2 normal, no murmur, click, rub or gallop. Chest Wall: no tenderness. Extremities: no cyanosis or edema, or clubbing. Pulses: 2+ and symmetric.  Abdomen: Distented; bowel sounds normal; no masses,  no organomegaly.   Skin: Skin color, texture, turgor normal. No rashes or lesions  Musculoskeletal:no clubbing, cyanosis  Neurologic: Normal strength and tone. No focal numbness or weakness    LABS  CBC    Recent Labs  Lab 03/16/18 1827   WBC 8.19   RBC 5.58*   HGB 9.4*   HCT 33.0*      MCV 59*   MCH 16.8*   MCHC 28.5*     BMP    Recent Labs  Lab 03/16/18 1827      K 4.5   CO2 23      BUN 29*   CREATININE 1.2       Recent Labs  Lab 03/16/18 1827   CALCIUM 9.1     LFT    Recent Labs  Lab 03/16/18 1827   PROT 6.5   ALBUMIN 3.3*   BILITOT 3.1*   AST 37   ALKPHOS 54*   ALT 26     CE    Recent Labs  Lab 03/16/18 1827   TROPONINI 0.507*     ABGs  No results for  input(s): PH, PCO2, PO2, HCO3, POCSATURATED, BE in the last 24 hours.  BNP    Recent Labs  Lab 03/16/18  1827   BNP 2,839*     UA  No results for input(s): COLORU, CLARITYU, SPECGRAV, PHUR, PROTEINUA, GLUCOSEU, BLOODU, WBCU, RBCU, BACTERIA, MUCUS in the last 24 hours.    Invalid input(s):  BILIRUBINCON  LAST HbA1c  Lab Results   Component Value Date    HGBA1C 5.0 12/07/2017         Diagnostic Results:    Imaging Results          X-Ray Chest AP Portable (Final result)  Result time 03/16/18 19:43:31    Final result by Keren Miguel MD (03/16/18 19:43:31)                 Impression:    As above.  Electronically signed by: Keren Miguel MD  Date:    03/16/2018  Time:    19:43             Narrative:    EXAMINATION:  XR CHEST AP PORTABLE  CLINICAL HISTORY:  Shortness of breath  TECHNIQUE:  Single frontal view of the chest was performed.  COMPARISON:  None  FINDINGS:  There is a left-sided cardiac pacing device in place.  The cardiomediastinal silhouette is enlarged, similar to prior examinations.  Please note of the lung apices are slightly obscured by the patient's chin.  There is pulmonary vascular congestion and increased interstitial and parenchymal attenuation suggestive of pulmonary edema/CHF.  There is persistent increased opacity in the left lower lung zone possibly representing component of pleural fluid as well as atelectasis/consolidation.  There is no evidence of pneumothorax.  Visualized osseous structures demonstrate no acute abnormality.                              ASSESSMENT/PLAN:   52 y.o.female has a past medical history of Anemia; Anticoagulant long-term use; Arthritis; Atrial fibrillation; Congestive heart failure; COPD (chronic obstructive pulmonary disease); Encounter for blood transfusion; GERD (gastroesophageal reflux disease); Hypertension; Right kidney mass; Sleep apnea; and Thyroid disease. here for worsening SOB:     Acute on Chronic CHF:   - Pt presented with worsening SOB, BNP 2839, CXR  with pulmonary congestion suggesting CHF. EKG with LVH and Left atrial enlargement.   - Last echo 12/2017: EF 20 % with mild MVR and small pericardial effusion  - Will repeat echo in AM.   - Cardio consult in AM  - Trend trops and EKG  -  S/p 80 mg IV lasix in the Ed  - Strict fluid intake  - strict I/O's   - Continue ASA, statin, hydralazine and isosorbide    Hx of COPD   - No wheezing in exam, CXR is hyperinflated.  - duo-nebs ordered as needed.      Elevated Trop:  - Initial trop was elevated, most likely due to heart strain 2/2 CHF  - Will trend trops along with EKG.     HTN:   - Continue hydralazine, coreg, isordil  - BP is on the high side. Hydralazine IV added as needed for SBP >165    A. Fib:   - Currently NSR  - Apixaban restarted.   - Continue Coreg.     Cariomyopathy, nonischemic:   - s/P ICD for primary prevention   -normal coronary arteries per [facility] in 01/2015; nuclear stress test today with fixed defect and no reversible ischemia  - Will repeat echo in AM.       PPx: PPi and Apixaban    Case discussed with the attending.        3/16/2018 Praveen Grayson M.D.

## 2018-03-17 NOTE — PLAN OF CARE
Problem: Patient Care Overview  Goal: Plan of Care Review  Outcome: Ongoing (interventions implemented as appropriate)  Plan of care reviewed with patient. Patient verbalized complete understanding. Fall precautions maintained. Bed in lowest position, locked, call light within reach and bed alarm is on. Side rails up x's 2 with slip resistant socks on. I/Os performed. Nurse educated the patient on  the importance of the fluid restriction to the plan of care. Nurse informed provider of abnormal heart rhythm. Nurse instructed patient to notify staff for any assistance and the patient verbalized complete understanding. Patient on telemetry throughout shift with no ectopy noted. Will continue to monitor.

## 2018-03-17 NOTE — PLAN OF CARE
Problem: Patient Care Overview  Goal: Plan of Care Review  Outcome: Ongoing (interventions implemented as appropriate)  Plan of care reviewed with the patient. Verbalized clear understanding. Bed alarm set. Bed in lowest position. Pt remain afebrile and free of fall. Call light within reach. Instructed pt to call when getting out of bed. Tylenol given once for pain but denies chest pain/discomfort. NSR HR between 60's-80's on telemetry monitor. Antihypertensive meds given. 500 ml fluid restriction. Ambulate with assist. Strict I's and O's. Pt position independently. Scheduled breathing treatment. No report of SOB or lightheadedness. Lasix given. Will continue to monitor.

## 2018-03-17 NOTE — PLAN OF CARE
03/17/18 1846   Discharge Assessment   Assessment Type Discharge Planning Assessment   Confirmed/corrected address and phone number on facesheet? Yes  ([address] [place] [address])   Prior to hospitilization cognitive status: Alert/Oriented   Prior to hospitalization functional status: Independent;Assistive Equipment   Current cognitive status: Alert/Oriented   Current Functional Status: Independent;Assistive Equipment   Facility Arrived From: home   Lives With grandchild(ricky)   Able to Return to Prior Arrangements yes   Is patient able to care for self after discharge? Yes   Who are your caregiver(s) and their phone number(s)? Jaye Baum(sister)[phone]   Patient's perception of discharge disposition home or selfcare   Readmission Within The Last 30 Days no previous admission in last 30 days   Patient currently being followed by outpatient case management? No   Patient currently receives any other outside agency services? No   Equipment Currently Used at Home walker, rolling;nebulizer;CPAP   Do you have any problems affording any of your prescribed medications? No   Is the patient taking medications as prescribed? yes   Does the patient have transportation home? Yes   Transportation Available family or friend will provide   Does the patient receive services at the [facility]? No   Discharge Plan A Home with family   Discharge Plan B Home Health   Patient/Family In Agreement With Plan yes   The Sw met with the pt in her room to complete the assessment. The pt was independent prior to her admit but has a rolling walker,nebulizer and cpap at home. The pt lives with her granddaughter.

## 2018-03-17 NOTE — NURSING
Spoke to Dr. Fuentes due to spirolactone schedule and allergy. Notify MD that spirolactone is giving her a headache and not cough per pt. MD states to hold spirolactone until speaks to the pt. Will continue to monitor.

## 2018-03-17 NOTE — PLAN OF CARE
Nurse informed Dr. Grayson Of the patient's 9-beat run of V-tach on tele,no signs of distress, no complaints from patient

## 2018-03-17 NOTE — NURSING
Spoken to Dr. Tamez concerning about the hydralazine that schedule @ 1400. Pt refused to take the med due to scared to drop her /66 and holding spirolactone per Dr. Fuentes. @1600 /95, HR 78 Dr. Tamez is aware and states whe will speak to the pt. Will continue to monitor.

## 2018-03-18 VITALS
DIASTOLIC BLOOD PRESSURE: 94 MMHG | HEIGHT: 66 IN | HEART RATE: 69 BPM | SYSTOLIC BLOOD PRESSURE: 162 MMHG | BODY MASS INDEX: 32.92 KG/M2 | RESPIRATION RATE: 16 BRPM | OXYGEN SATURATION: 93 % | WEIGHT: 204.81 LBS | TEMPERATURE: 98 F

## 2018-03-18 LAB
ALBUMIN SERPL BCP-MCNC: 3.2 G/DL
ALP SERPL-CCNC: 48 U/L
ALT SERPL W/O P-5'-P-CCNC: 23 U/L
ANION GAP SERPL CALC-SCNC: 11 MMOL/L
ANISOCYTOSIS BLD QL SMEAR: ABNORMAL
AORTIC VALVE REGURGITATION: ABNORMAL
AST SERPL-CCNC: 15 U/L
BASOPHILS # BLD AUTO: ABNORMAL K/UL
BASOPHILS NFR BLD: 0 %
BILIRUB SERPL-MCNC: 3 MG/DL
BUN SERPL-MCNC: 31 MG/DL
BURR CELLS BLD QL SMEAR: ABNORMAL
CALCIUM SERPL-MCNC: 9.2 MG/DL
CHLORIDE SERPL-SCNC: 103 MMOL/L
CO2 SERPL-SCNC: 29 MMOL/L
CREAT SERPL-MCNC: 1.2 MG/DL
DACRYOCYTES BLD QL SMEAR: ABNORMAL
DIASTOLIC DYSFUNCTION: YES
DIFFERENTIAL METHOD: ABNORMAL
EOSINOPHIL # BLD AUTO: ABNORMAL K/UL
EOSINOPHIL NFR BLD: 3 %
ERYTHROCYTE [DISTWIDTH] IN BLOOD BY AUTOMATED COUNT: 28.9 %
EST. GFR  (AFRICAN AMERICAN): >60 ML/MIN/1.73 M^2
EST. GFR  (NON AFRICAN AMERICAN): 52 ML/MIN/1.73 M^2
ESTIMATED PA SYSTOLIC PRESSURE: 36.3
GLOBAL PERICARDIAL EFFUSION: ABNORMAL
GLUCOSE SERPL-MCNC: 120 MG/DL
HCT VFR BLD AUTO: 32.8 %
HGB BLD-MCNC: 9.3 G/DL
HYPOCHROMIA BLD QL SMEAR: ABNORMAL
LYMPHOCYTES # BLD AUTO: ABNORMAL K/UL
LYMPHOCYTES NFR BLD: 30 %
MAGNESIUM SERPL-MCNC: 2 MG/DL
MCH RBC QN AUTO: 16.8 PG
MCHC RBC AUTO-ENTMCNC: 28.4 G/DL
MCV RBC AUTO: 59 FL
MITRAL VALVE REGURGITATION: ABNORMAL
MONOCYTES # BLD AUTO: ABNORMAL K/UL
MONOCYTES NFR BLD: 7 %
NEUTROPHILS NFR BLD: 58 %
NEUTS BAND NFR BLD MANUAL: 2 %
NRBC BLD-RTO: ABNORMAL /100 WBC
OVALOCYTES BLD QL SMEAR: ABNORMAL
PHOSPHATE SERPL-MCNC: 4.6 MG/DL
PLATELET # BLD AUTO: 235 K/UL
PLATELET BLD QL SMEAR: ABNORMAL
PMV BLD AUTO: ABNORMAL FL
POIKILOCYTOSIS BLD QL SMEAR: ABNORMAL
POLYCHROMASIA BLD QL SMEAR: ABNORMAL
POTASSIUM SERPL-SCNC: 3.8 MMOL/L
PROT SERPL-MCNC: 5.8 G/DL
RBC # BLD AUTO: 5.52 M/UL
RETIRED EF AND QEF - SEE NOTES: 20 (ref 55–65)
SCHISTOCYTES BLD QL SMEAR: PRESENT
SODIUM SERPL-SCNC: 143 MMOL/L
TARGETS BLD QL SMEAR: ABNORMAL
WBC # BLD AUTO: 7.34 K/UL

## 2018-03-18 PROCEDURE — 25000003 PHARM REV CODE 250: Performed by: FAMILY MEDICINE

## 2018-03-18 PROCEDURE — 90472 IMMUNIZATION ADMIN EACH ADD: CPT | Performed by: FAMILY MEDICINE

## 2018-03-18 PROCEDURE — 94640 AIRWAY INHALATION TREATMENT: CPT

## 2018-03-18 PROCEDURE — 90670 PCV13 VACCINE IM: CPT | Performed by: FAMILY MEDICINE

## 2018-03-18 PROCEDURE — 99900035 HC TECH TIME PER 15 MIN (STAT)

## 2018-03-18 PROCEDURE — 36415 COLL VENOUS BLD VENIPUNCTURE: CPT

## 2018-03-18 PROCEDURE — 94761 N-INVAS EAR/PLS OXIMETRY MLT: CPT

## 2018-03-18 PROCEDURE — 25000003 PHARM REV CODE 250: Performed by: STUDENT IN AN ORGANIZED HEALTH CARE EDUCATION/TRAINING PROGRAM

## 2018-03-18 PROCEDURE — 85007 BL SMEAR W/DIFF WBC COUNT: CPT

## 2018-03-18 PROCEDURE — 25000242 PHARM REV CODE 250 ALT 637 W/ HCPCS: Performed by: FAMILY MEDICINE

## 2018-03-18 PROCEDURE — 80053 COMPREHEN METABOLIC PANEL: CPT

## 2018-03-18 PROCEDURE — A4216 STERILE WATER/SALINE, 10 ML: HCPCS | Performed by: FAMILY MEDICINE

## 2018-03-18 PROCEDURE — 63600175 PHARM REV CODE 636 W HCPCS: Performed by: FAMILY MEDICINE

## 2018-03-18 PROCEDURE — 90471 IMMUNIZATION ADMIN: CPT | Performed by: FAMILY MEDICINE

## 2018-03-18 PROCEDURE — 83735 ASSAY OF MAGNESIUM: CPT

## 2018-03-18 PROCEDURE — 3E0234Z INTRODUCTION OF SERUM, TOXOID AND VACCINE INTO MUSCLE, PERCUTANEOUS APPROACH: ICD-10-PCS | Performed by: FAMILY MEDICINE

## 2018-03-18 PROCEDURE — 90686 IIV4 VACC NO PRSV 0.5 ML IM: CPT | Performed by: FAMILY MEDICINE

## 2018-03-18 PROCEDURE — 84100 ASSAY OF PHOSPHORUS: CPT

## 2018-03-18 PROCEDURE — 85027 COMPLETE CBC AUTOMATED: CPT

## 2018-03-18 RX ORDER — SPIRONOLACTONE 25 MG/1
25 TABLET ORAL DAILY
Qty: 30 TABLET | Refills: 11 | Status: ON HOLD | OUTPATIENT
Start: 2018-03-19 | End: 2018-06-13

## 2018-03-18 RX ORDER — ATORVASTATIN CALCIUM 40 MG/1
40 TABLET, FILM COATED ORAL DAILY
Qty: 90 TABLET | Refills: 3 | Status: SHIPPED | OUTPATIENT
Start: 2018-03-19 | End: 2022-03-04 | Stop reason: SDUPTHER

## 2018-03-18 RX ORDER — FLUTICASONE FUROATE AND VILANTEROL 100; 25 UG/1; UG/1
1 POWDER RESPIRATORY (INHALATION) DAILY
Status: DISCONTINUED | OUTPATIENT
Start: 2018-03-18 | End: 2018-03-18 | Stop reason: HOSPADM

## 2018-03-18 RX ORDER — GUAIFENESIN 100 MG/5ML
200 SOLUTION ORAL EVERY 4 HOURS PRN
Status: DISCONTINUED | OUTPATIENT
Start: 2018-03-18 | End: 2018-03-18 | Stop reason: HOSPADM

## 2018-03-18 RX ADMIN — GUAIFENESIN 200 MG: 200 SOLUTION ORAL at 12:03

## 2018-03-18 RX ADMIN — ATORVASTATIN CALCIUM 40 MG: 40 TABLET, FILM COATED ORAL at 08:03

## 2018-03-18 RX ADMIN — APIXABAN 5 MG: 5 TABLET, FILM COATED ORAL at 08:03

## 2018-03-18 RX ADMIN — GUAIFENESIN 200 MG: 200 SOLUTION ORAL at 07:03

## 2018-03-18 RX ADMIN — INFLUENZA A VIRUS A/MICHIGAN/45/2015 X-275 (H1N1) ANTIGEN (FORMALDEHYDE INACTIVATED), INFLUENZA A VIRUS A/HONG KONG/4801/2014 X-263B (H3N2) ANTIGEN (FORMALDEHYDE INACTIVATED), INFLUENZA B VIRUS B/PHUKET/3073/2013 ANTIGEN (FORMALDEHYDE INACTIVATED), AND INFLUENZA B VIRUS B/BRISBANE/60/2008 ANTIGEN (FORMALDEHYDE INACTIVATED) 0.5 ML: 15; 15; 15; 15 INJECTION, SUSPENSION INTRAMUSCULAR at 02:03

## 2018-03-18 RX ADMIN — CARVEDILOL 25 MG: 25 TABLET, FILM COATED ORAL at 08:03

## 2018-03-18 RX ADMIN — PANTOPRAZOLE SODIUM 40 MG: 40 TABLET, DELAYED RELEASE ORAL at 08:03

## 2018-03-18 RX ADMIN — SPIRONOLACTONE 25 MG: 25 TABLET, FILM COATED ORAL at 08:03

## 2018-03-18 RX ADMIN — IPRATROPIUM BROMIDE AND ALBUTEROL SULFATE 3 ML: .5; 3 SOLUTION RESPIRATORY (INHALATION) at 07:03

## 2018-03-18 RX ADMIN — PNEUMOCOCCAL 13-VALENT CONJUGATE VACCINE 0.5 ML: 2.2; 2.2; 2.2; 2.2; 2.2; 4.4; 2.2; 2.2; 2.2; 2.2; 2.2; 2.2; 2.2 INJECTION, SUSPENSION INTRAMUSCULAR at 02:03

## 2018-03-18 RX ADMIN — ALBUTEROL SULFATE 2 PUFF: 90 AEROSOL, METERED RESPIRATORY (INHALATION) at 07:03

## 2018-03-18 RX ADMIN — ISOSORBIDE DINITRATE 20 MG: 20 TABLET ORAL at 08:03

## 2018-03-18 RX ADMIN — HYDRALAZINE HYDROCHLORIDE 25 MG: 25 TABLET, FILM COATED ORAL at 05:03

## 2018-03-18 RX ADMIN — HYDRALAZINE HYDROCHLORIDE 25 MG: 25 TABLET, FILM COATED ORAL at 01:03

## 2018-03-18 RX ADMIN — SODIUM CHLORIDE, PRESERVATIVE FREE 3 ML: 5 INJECTION INTRAVENOUS at 05:03

## 2018-03-18 RX ADMIN — FUROSEMIDE 80 MG: 10 INJECTION, SOLUTION INTRAMUSCULAR; INTRAVENOUS at 08:03

## 2018-03-18 NOTE — NURSING
Pt discharge to home. Pt is stable. Discharge teaching discussed with pt. Verbalized clear understanding. IV removed and exhibit no signs of active bleeding. No complaints of discomfort or pain. No respiratory distress noted. Telemetry box removed. Waiting for transport to [facility].

## 2018-03-18 NOTE — NURSING
Pt complaints of pain and discomfort on her middle to bilateral lower ribs that goes around to her back. She rated 10/10 on pain scale. Pt requested for pain medication. Notify Dr. Ramirez and aware of it. MD states will see the pt.

## 2018-03-18 NOTE — PROGRESS NOTES
Progress Note  [facility]  Admit Date: 3/16/2018   LOS: 1 day   SUBJECTIVE:   Follow-up For: SOB    Patient seen and examined this AM.  Doing well, She says that SOB has improved and she has had good urine output. She is requesting discharge home.     ROS  Denies CP/N/V/F/C.   OBJECTIVE:   Vital Signs (Most Recent)  Temp: 96.5 °F (35.8 °C) (03/18/18 0732)  Pulse: 88 (03/18/18 0755)  Resp: 18 (03/18/18 0755)  BP: (!) 163/102 (03/18/18 0732)  SpO2: 96 % (03/18/18 0755)    I & O (Last 24H):    Intake/Output Summary (Last 24 hours) at 03/18/18 0755  Last data filed at 03/18/18 0529   Gross per 24 hour   Intake             1008 ml   Output             2650 ml   Net            -1642 ml     Wt Readings from Last 3 Encounters:   03/17/18 92.9 kg (204 lb 12.9 oz)   02/11/18 91.2 kg (201 lb)   02/05/18 89.8 kg (198 lb)       Current Diet Order   Procedures    Diet Cardiac        Physical Exam  General: well developed, well nourished  HENT: Head:normocephalic, atraumatic.   Neck: supple, symmetrical, trachea midline, no JVD  Lungs: normal effort, clear to ausculation, no w/r/r  Cardiovascular: Heart: regular rate and rhythm, S1, S2 normal, no murmur, click, rub or gallop. Chest Wall: no tenderness. Extremities: no cyanosis or edema, or clubbing. Pulses: 2+ and symmetric.  Abdomen:  ntnd, +bs.   Skin: Skin color, texture, turgor normal. No rashes or lesions  Musculoskeletal:no clubbing, cyanosis  Neurologic: Normal strength and tone. No focal numbness or weakness    Laboratory Data:  CBC    Recent Labs  Lab 03/16/18  1827 03/17/18  0312 03/18/18  0413   WBC 8.19 7.40 7.34   RBC 5.58* 5.57* 5.52*   HGB 9.4* 9.4* 9.3*   HCT 33.0* 32.6* 32.8*    254 235   MCV 59* 59* 59*   MCH 16.8* 16.9* 16.8*   MCHC 28.5* 28.8* 28.4*     CMP    Recent Labs  Lab 03/16/18  1827 03/17/18  0312 03/18/18  0412   CALCIUM 9.1 9.0 9.2   PROT 6.5 6.3 5.8*    142 143   K 4.5 4.0 3.8   CO2 23 25 29    105 103   BUN 29* 29* 31*    CREATININE 1.2 1.2 1.2   ALKPHOS 54* 54* 48*   ALT 26 27 23   AST 37 22 15   BILITOT 3.1* 2.8* 3.0*     Echo:  CONCLUSIONS     1 - Severely depressed left ventricular systolic function (EF 20-25%).     2 - Impaired LV relaxation, normal LAP (grade 1 diastolic dysfunction).     3 - Enlarged left ventricular enlargement.     4 - Concentric hypertrophy.     5 - Biatrial enlargement.     6 - Normal right ventricular systolic function .     7 - The estimated PA systolic pressure is 36 mmHg.     8 - Moderate mitral regurgitation.   ASSESSMENT/PLAN:   52 y.o.female has a past medical history of Anemia; Anticoagulant long-term use; Arthritis; Atrial fibrillation; Congestive heart failure; COPD (chronic obstructive pulmonary disease); Encounter for blood transfusion; GERD (gastroesophageal reflux disease); Hypertension; Right kidney mass; Sleep apnea; and Thyroid disease. here for worsening SOB likely 2/2 acute on chronic chf vs copd exacerbation.      Acute on Chronic CHF:   - on admission BNP 2839, CXR with pulmonary congestion suggesting CHF. EKG with LVH and Left atrial enlargement.   - Last echo 12/2017: EF 20 % with mild MVR and small pericardial effusion  - Repeat echo ef 20, DDgrade 1, moderate MVR  - IV lasix 80 BID  - Strict fluid intake: net (-) 3196 sicne admission  - Continue ASA, statin, hydralazine and isosorbide and coreg and lasix     Hx of COPD   - No wheezing in exam, CXR is hyperinflated.  - duo-nebs ordered as needed.      Elevated Trop:  - Initial trop was elevated, most likely due to heart strain 2/2 CHF  - Trops trended down and EKG no changes     HTN:   - Continue hydralazine, coreg, isordil  - BP is on the high side. Hydralazine IV added as needed for SBP >165     A. Fib:   - Currently NSR  - Apixaban restarted.   - Continue Coreg.      Cariomyopathy, nonischemic:   - s/P ICD for primary prevention   -normal coronary arteries per [facility] in 01/2015; nuclear stress test today with fixed defect and no  reversible ischemia  - Echo in AM.         PPx: PPi and Apixaban  Code: full  Dispo: discharge home today with handout on CHF and recs to follow up with PCP and cardiology.      Case discussed with the attending.      Aster Paz D.O.  [facility] HO-2  03/18/2018

## 2018-03-18 NOTE — PLAN OF CARE
Problem: Patient Care Overview  Goal: Plan of Care Review  Outcome: Ongoing (interventions implemented as appropriate)  Care plan reviewed with patient.  Patient verbalized understanding.  Patient on continuous tele monitoring, NSR.  Patient on room air.  Reinforce pt about fluid restrictions.  Pt states she understands she can have 500 cc of fluids in a day.  Call lights within reach, bed alarm off (pt refuse), bed in lowest setting.  Staff will continue to monitor.

## 2018-03-18 NOTE — PLAN OF CARE
Discharge orders noted, no HH or HME ordered.    Future Appointments  Date Time Provider Department Center   3/22/2018 11:00 AM Lourdes Rico, DO [facility]   4/12/2018 3:40 PM PACEMAKER, ICD [facility] Arie Taylor       Pt's nurse will go over medications/signs and symptoms prior to discharge       03/18/18 1400   Final Note   Assessment Type Final Discharge Note   Discharge Disposition Home   What phone number can be called within the next 1-3 days to see how you are doing after discharge? [phone]   Hospital Follow Up  Appt(s) scheduled? No  (Offices closed for weekend. Patient to schedule own follow up appointment.)   Right Care Referral Info   Post Acute Recommendation No Care     Kira Castaneda, RN Transitional Navigator  [phone]

## 2018-03-22 ENCOUNTER — TELEPHONE (OUTPATIENT)
Dept: ELECTROPHYSIOLOGY | Facility: CLINIC | Age: 53
End: 2018-03-22

## 2018-03-22 NOTE — TELEPHONE ENCOUNTER
----- Message from Rebecca Stack MA sent at 3/22/2018 11:27 AM CDT -----  Contact: pt      ----- Message -----  From: Diana Ritter  Sent: 3/22/2018  11:17 AM  To: Sukhwinder Rojas Staff    Pls call pt at [phone].  Pt has a defibrillator and has been experiencing some throbbing around her chest area at rest that she is concerned about.  She wants to know if this is normal or not.    Thank you

## 2018-03-22 NOTE — TELEPHONE ENCOUNTER
----- Message from Diana Ritter sent at 3/22/2018  3:20 PM CDT -----  Contact: pt  Pt returned your call and can be reached at [phone].    Thank you

## 2018-03-22 NOTE — TELEPHONE ENCOUNTER
Attempted to speak with patient and she was fighting with her kids the whole time, we were disconnected and I was unable to find out what she needed from us.

## 2018-03-23 ENCOUNTER — TELEPHONE (OUTPATIENT)
Dept: ELECTROPHYSIOLOGY | Facility: CLINIC | Age: 53
End: 2018-03-23

## 2018-03-23 NOTE — TELEPHONE ENCOUNTER
----- Message from Katarina Melo sent at 3/23/2018 11:26 AM CDT -----  Contact: patient  Please call pt at [phone]. Patient is having shoulder and arm pain with palpitation for a while. Dr Pretty pt    Thank you

## 2018-03-27 NOTE — DISCHARGE SUMMARY
[facility]  Discharge Summary     Patient ID:  Hafsa Hawley  [id number]  52 y.o.  1965    Admit date: 3/16/2018    Discharge Date and Time: 3/18/2018  4:30 PM    Admitting Physician: Timbo Turk III, MD     Discharge Provider: Cristobal Beard    Reason for Admission: Shortness of breath [R06.02]  Elevated troponin [R74.8]  Acute on chronic combined systolic and diastolic congestive heart failure [I50.43]  Acute on chronic combined systolic and diastolic congestive heart failure [I50.43]    Admission Condition: fair    Procedures Performed: * No surgery found *    HPI: 52 y.o. F with HFrEF (EF 20) with diastolic dysfunction s/p pacemaker, CAD, HTN, AFib, MELISSA, presented to the ER with worsening SOB. It started few days ago and is progressively getting worse. She also complaints of chest tightness. She tried using her inhalers and CPAP overnight to help her with her symptoms but no  Relief. She also reports cough with green/yellow sputum. She denies any fever, chills, cold like symptoms and bodyaches. She also reports feeling full quickly with small bites or drinks. She has been compliant with her meds and has stopped taking any salt in her diet. She denies any changes in her lifestyle, diet or travel. She did take few extra doses of her lasix as she was advised by her PCP if the symptoms worsen.     Hospital Course (synopsis of major diagnoses, care, treatment, and services provided during the course of the hospital stay):Patient was admitted for acute HF vs COPD exacerbation. CXR was taken which showed pulmonary congesting suggesting CHF. Last Echo was 12/2017 which showed an EF of 20% with mild MVR and small pericardial effusion. BNP was 2839. Cardiology was consulted to repeat Echocardiography which showed EF of 20%, DDgrade 1, and moderate MVR. Troponins were obtained which slowed an initial elevation most likely due to heart strain 2/2 CHF, and patient was given IV Lasix with strict fluid  intake. Patient was continued on ASA, statin, coreg, isodril, and hydralazine IV for SBP >165. She was restarted on Apixaban for her Atrial fibrillation. Patient's symptoms improved during hospitalization.    Patient continued to do well on this regimen and was discharged after a return to baseline.    Consults: Cardiology    Significant Diagnostic Studies:     Lab Results   Component Value Date    WBC 7.34 03/18/2018    HGB 9.3 (L) 03/18/2018    HCT 32.8 (L) 03/18/2018    MCV 59 (L) 03/18/2018     03/18/2018     CMP  Sodium   Date Value Ref Range Status   03/18/2018 143 136 - 145 mmol/L Final     Potassium   Date Value Ref Range Status   03/18/2018 3.8 3.5 - 5.1 mmol/L Final     Chloride   Date Value Ref Range Status   03/18/2018 103 95 - 110 mmol/L Final     CO2   Date Value Ref Range Status   03/18/2018 29 23 - 29 mmol/L Final     Glucose   Date Value Ref Range Status   03/18/2018 120 (H) 70 - 110 mg/dL Final     BUN, Bld   Date Value Ref Range Status   03/18/2018 31 (H) 6 - 20 mg/dL Final     Creatinine   Date Value Ref Range Status   03/18/2018 1.2 0.5 - 1.4 mg/dL Final     Calcium   Date Value Ref Range Status   03/18/2018 9.2 8.7 - 10.5 mg/dL Final     Total Protein   Date Value Ref Range Status   03/18/2018 5.8 (L) 6.0 - 8.4 g/dL Final     Albumin   Date Value Ref Range Status   03/18/2018 3.2 (L) 3.5 - 5.2 g/dL Final     Total Bilirubin   Date Value Ref Range Status   03/18/2018 3.0 (H) 0.1 - 1.0 mg/dL Final     Comment:     For infants and newborns, interpretation of results should be based  on gestational age, weight and in agreement with clinical  observations.  Premature Infant recommended reference ranges:  Up to 24 hours.............<8.0 mg/dL  Up to 48 hours............<12.0 mg/dL  3-5 days..................<15.0 mg/dL  6-29 days.................<15.0 mg/dL       Alkaline Phosphatase   Date Value Ref Range Status   03/18/2018 48 (L) 55 - 135 U/L Final     AST   Date Value Ref Range Status    03/18/2018 15 10 - 40 U/L Final     ALT   Date Value Ref Range Status   03/18/2018 23 10 - 44 U/L Final     Anion Gap   Date Value Ref Range Status   03/18/2018 11 8 - 16 mmol/L Final     eGFR if    Date Value Ref Range Status   03/18/2018 >60 >60 mL/min/1.73 m^2 Final     eGFR if non    Date Value Ref Range Status   03/18/2018 52 (A) >60 mL/min/1.73 m^2 Final     Comment:     Calculation used to obtain the estimated glomerular filtration  rate (eGFR) is the CKD-EPI equation.        troponins - 0.550 (3/16/18) and 0.492 (3/17/18)  BNP - 2839    CXR (3/16/18):  FINDINGS:  There is a left-sided cardiac pacing device in place.  The cardiomediastinal silhouette is enlarged, similar to prior examinations.  Please note of the lung apices are slightly obscured by the patient's chin.  There is pulmonary vascular congestion and increased interstitial and parenchymal attenuation suggestive of pulmonary edema/CHF.  There is persistent increased opacity in the left lower lung zone possibly representing component of pleural fluid as well as atelectasis/consolidation.  There is no evidence of pneumothorax.  Visualized osseous structures demonstrate no acute abnormality.    Echo:  CONCLUSIONS     1 - Severely depressed left ventricular systolic function (EF 20-25%).     2 - Impaired LV relaxation, normal LAP (grade 1 diastolic dysfunction).     3 - Enlarged left ventricular enlargement.     4 - Concentric hypertrophy.     5 - Biatrial enlargement.     6 - Normal right ventricular systolic function .     7 - The estimated PA systolic pressure is 36 mmHg.     8 - Moderate mitral regurgitation.     Final Diagnoses:    Principal Problem: Acute on chronic combined systolic and diastolic congestive heart failure   Secondary Diagnoses:   Active Hospital Problems    Diagnosis  POA    *Acute on chronic combined systolic and diastolic congestive heart failure [I50.43]  Yes      Resolved Hospital Problems     Diagnosis Date Resolved POA   No resolved problems to display.       Discharged Condition: stable    Discharge Exam:  General: well developed, well nourished  HENT: Head:normocephalic, atraumatic.   Neck: supple, symmetrical, trachea midline, no JVD  Lungs: normal effort, clear to ausculation, no w/r/r  Cardiovascular: Heart: regular rate and rhythm, S1, S2 normal, no murmur, click, rub or gallop. Chest Wall: no tenderness. Extremities: no cyanosis or edema, or clubbing. Pulses: 2+ and symmetric.  Abdomen:  ntnd, +bs.   Skin: Skin color, texture, turgor normal. No rashes or lesions  Musculoskeletal:no clubbing, cyanosis  Neurologic: Normal strength and tone. No focal numbness or weakness    Disposition: Home or Self Care    Follow Up/Patient Instructions:     Medications:  Reconciled Home Medications:      Medication List      START taking these medications    atorvastatin 40 MG tablet  Commonly known as:  LIPITOR  Take 1 tablet (40 mg total) by mouth once daily.     spironolactone 25 MG tablet  Commonly known as:  ALDACTONE  Take 1 tablet (25 mg total) by mouth once daily.        CONTINUE taking these medications    albuterol-ipratropium 2.5mg-0.5mg/3mL 0.5 mg-3 mg(2.5 mg base)/3 mL nebulizer solution  Commonly known as:  DUO-NEB     ALPRAZolam 2 MG Tab  Commonly known as:  XANAX     b complex vitamins tablet     carvedilol 25 MG tablet  Commonly known as:  COREG  Take 1 tablet (25 mg total) by mouth 2 (two) times daily.     cyanocobalamin (vitamin B-12) 50 mcg tablet     ELIQUIS 5 mg Tab  Generic drug:  apixaban  TAKE 1 TABLET BY MOUTH TWICE DAILY     fluticasone 50 mcg/actuation nasal spray  Commonly known as:  FLONASE     furosemide 80 MG tablet  Commonly known as:  LASIX  Take 1 tablet (80 mg total) by mouth 2 (two) times daily.     gabapentin 100 MG capsule  Commonly known as:  NEURONTIN     hydrALAZINE 25 MG tablet  Commonly known as:  APRESOLINE  TAKE 1 TABLET BY MOUTH TWICE DAILY     isosorbide dinitrate  20 MG tablet  Commonly known as:  ISORDIL  TAKE 1 TABLET BY MOUTH TWICE DAILY     NITROSTAT 0.4 MG SL tablet  Generic drug:  nitroGLYCERIN     pantoprazole 40 MG tablet  Commonly known as:  PROTONIX     potassium chloride 10 MEQ Tbsr  Commonly known as:  KLOR-CON     VENTOLIN HFA 90 mcg/actuation inhaler  Generic drug:  albuterol     walker Misc  1 Device by Misc.(Non-Drug; Combo Route) route as needed.           Where to Get Your Medications      You can get these medications from any pharmacy    Bring a paper prescription for each of these medications  · atorvastatin 40 MG tablet  · spironolactone 25 MG tablet         Discharge Procedure Orders  Diet Cardiac   Order Comments: Low sodium     Activity as tolerated       Follow-up Information     Schedule an appointment as soon as possible for a visit with Haylie Lion MD.    Specialty:  Cardiology  Why:  Offices closed for weekend. Patient to schedule own follow up appointment.  Contact information:  [place] [address]  [phone]                 Activity: activity as tolerated  Diet: cardiac diet  Wound Care: none needed    Greater than 30 minutes was spent on discharge planning with this patient    Signed:  Cristobal Beard  3/28/2018  1:40 PM

## 2018-04-02 NOTE — PHYSICIAN QUERY
PT Name: Hafsa Hawley  MR #: [id number]    Physician Query Form - Atrial Fibrillation Specificity     CDS/: Jeane Sebastian               Contact information: [email or web address]       This form is a permanent document in the medical record.     Query Date: April 2, 2018    By submitting this query, we are merely seeking further clarification of documentation. Please utilize your independent clinical judgment when addressing the question(s) below.    The medical record contains the following:   Indicators     Supporting Clinical Findings Location in Medical Record   x Atrial Fibrillation A. Fib:   - Currently NSR  - Apixaban restarted.   - Continue Coreg.  H&P    EKG results     x Medication apixaban tablet 5 mg  Dose: 5 mg  Freq: 2 times daily Route: Oral  Start: 03/16/18 2315 End: 03/18/18 1904    carvedilol tablet 25 mg  Dose: 25 mg  Freq: 2 times daily Route: Oral  Start: 03/17/18 0900 End: 03/18/18 1904 MAR           MAR     Treatment      Other         Provider, please further specify the Atrial Fibrillation diagnosis.    [ x ] Chronic  [  ] Paroxysmal  [  ] Permanent  [  ] Persistent  [  ] Other (please specify): ____________________________  [  ] Clinically Undetermined    Please document in your progress notes daily for the duration of treatment until resolved, and include in your discharge summary.

## 2018-04-02 NOTE — PHYSICIAN QUERY
PT Name: Hafsa Hawley  MR #: [id number]     Physician Query Form - Documentation Clarification      CDS/: Jeane Sebastian               Contact information: [email or web address]      This form is a permanent document in the medical record.     Query Date: April 2, 2018    By submitting this query, we are merely seeking further clarification of documentation. Please utilize your independent clinical judgment when addressing the question(s) below.    The Medical record reflects the following:    Supporting Clinical Findings Location in Medical Record     Cariomyopathy, nonischemic:   - s/P ICD for primary prevention   -normal coronary arteries per [facility] in 01/2015; nuclear stress test today with fixed defect and no reversible ischemia  - Echo in AM.       1 - Severely depressed left ventricular systolic function (EF 20-25%).     2 - Impaired LV relaxation, normal LAP (grade 1 diastolic dysfunction).     3 - Enlarged left ventricular enlargement.     4 - Concentric hypertrophy.     5 - Biatrial enlargement.     6 - Normal right ventricular systolic function .     7 - The estimated PA systolic pressure is 36 mmHg.     8 - Moderate mitral regurgitation.   H&P progress notes             2 D Echo 3/17                                                                             Doctor, Please specify diagnosis or diagnoses associated with above clinical findings.    Provider Use Only      Please specify the type of  Non-Ischemic Cardiomyopathy:    [   x  ] Dilated Cardiomyopathy  [     ] Hypertrophic Cardiomyopathy   [     ] Restrictive Cardiomyopathy   [  x   ] Other:_severely depressed LV__systolic function (EF 20%_, grade 1 diastolic dysfunction, LVH, pulmonary hypertension, moderate  Mitral regurgittion_,____________                                                                                                               [  ] Clinically undetermined

## 2018-04-12 ENCOUNTER — CLINICAL SUPPORT (OUTPATIENT)
Dept: ELECTROPHYSIOLOGY | Facility: CLINIC | Age: 53
End: 2018-04-12
Attending: INTERNAL MEDICINE
Payer: MEDICAID

## 2018-04-12 DIAGNOSIS — Z95.810 AICD (AUTOMATIC CARDIOVERTER/DEFIBRILLATOR) PRESENT: ICD-10-CM

## 2018-04-12 DIAGNOSIS — I48.0 PAF (PAROXYSMAL ATRIAL FIBRILLATION): ICD-10-CM

## 2018-04-12 DIAGNOSIS — I42.8 NONISCHEMIC CARDIOMYOPATHY: ICD-10-CM

## 2018-04-12 DIAGNOSIS — I50.9 CHF (CONGESTIVE HEART FAILURE): ICD-10-CM

## 2018-04-12 PROCEDURE — 93282 PRGRMG EVAL IMPLANTABLE DFB: CPT | Mod: PBBFAC | Performed by: INTERNAL MEDICINE

## 2018-05-08 PROBLEM — I50.9 CONGESTIVE HEART FAILURE: Status: ACTIVE | Noted: 2018-05-08

## 2018-05-08 PROBLEM — N18.30 CHRONIC KIDNEY DISEASE, STAGE III (MODERATE): Status: ACTIVE | Noted: 2018-05-08

## 2018-05-08 PROBLEM — F41.9 ANXIETY: Status: ACTIVE | Noted: 2018-05-08

## 2018-05-08 PROBLEM — J18.9 PNEUMONIA: Status: ACTIVE | Noted: 2018-05-08

## 2018-05-09 PROBLEM — J44.1 CHRONIC OBSTRUCTIVE PULMONARY DISEASE WITH ACUTE EXACERBATION: Status: ACTIVE | Noted: 2017-12-19

## 2018-05-10 PROBLEM — R79.89 ABNORMAL TSH: Status: ACTIVE | Noted: 2018-05-10

## 2018-06-11 PROBLEM — I50.43 ACUTE ON CHRONIC COMBINED SYSTOLIC (CONGESTIVE) AND DIASTOLIC (CONGESTIVE) HEART FAILURE: Status: RESOLVED | Noted: 2018-06-11 | Resolved: 2018-06-11

## 2018-06-11 PROBLEM — R17 ELEVATED BILIRUBIN: Status: ACTIVE | Noted: 2018-06-11

## 2018-06-11 PROBLEM — F32.A ANXIETY AND DEPRESSION: Status: ACTIVE | Noted: 2018-05-08

## 2018-06-11 PROBLEM — I42.8 NON-ISCHEMIC CARDIOMYOPATHY: Status: ACTIVE | Noted: 2018-06-11

## 2018-06-11 PROBLEM — I50.43 ACUTE ON CHRONIC COMBINED SYSTOLIC (CONGESTIVE) AND DIASTOLIC (CONGESTIVE) HEART FAILURE: Status: ACTIVE | Noted: 2018-06-11

## 2018-06-11 PROBLEM — J18.9 PNEUMONIA: Status: RESOLVED | Noted: 2018-05-08 | Resolved: 2018-06-11

## 2018-06-13 ENCOUNTER — TELEPHONE (OUTPATIENT)
Dept: TRANSPLANT | Facility: CLINIC | Age: 53
End: 2018-06-13

## 2018-06-13 DIAGNOSIS — I50.9 CONGESTIVE HEART FAILURE, UNSPECIFIED HF CHRONICITY, UNSPECIFIED HEART FAILURE TYPE: Primary | ICD-10-CM

## 2018-06-13 PROBLEM — R79.89 ABNORMAL TSH: Status: RESOLVED | Noted: 2018-05-10 | Resolved: 2018-06-13

## 2018-06-13 NOTE — TELEPHONE ENCOUNTER
REFERRAL NOTE:    Hafsa Hawley has been referred to the pre-heart transplant office for consideration for orthotopic heart transplantation by Dr Pacheco. Patient's appointments have been scheduled for 06/21/18. Spoke to Xiomy with Dr Pacheco's office ([phone]) who will inform patient of scheduled appointments for test and with Dr Orozco. Plan is to have an echo done prior to discharge as patient currently an inpatient. Information was provided and questions were answered. AHF/ Transplant handout and appointment letter was  mailed to the patient. My contact information given to Xiomy and also asked if would give information to patient. Xiomy agreed. Call PRN.

## 2018-06-18 ENCOUNTER — INITIAL CONSULT (OUTPATIENT)
Dept: NEUROLOGY | Facility: HOSPITAL | Age: 53
End: 2018-06-18
Attending: SURGERY
Payer: MEDICAID

## 2018-06-18 DIAGNOSIS — D35.01 ADRENAL ADENOMA, RIGHT: Primary | ICD-10-CM

## 2018-06-18 LAB
EXT 24 HR UR METANEPHRINE: ABNORMAL
EXT 24 HR UR NORMETANEPHRINE: ABNORMAL
EXT 24 HR UR NORMETANEPHRINE: ABNORMAL
EXT 25 HYDROXY VIT D2: ABNORMAL
EXT 25 HYDROXY VIT D3: ABNORMAL
EXT 5 HIAA 24 HR URINE: ABNORMAL
EXT 5 HIAA BLOOD: ABNORMAL
EXT ACTH: ABNORMAL
EXT AFP: ABNORMAL
EXT ALBUMIN: 3.9 G/DL (ref 3.6–5.1)
EXT ALKALINE PHOSPHATASE: 52 U/L (ref 33–130)
EXT ALT: 14 U/L (ref 6–29)
EXT AMYLASE: ABNORMAL
EXT ANTI ISLET CELL AB: ABNORMAL
EXT ANTI PARIETAL CELL AB: ABNORMAL
EXT ANTI THYROID AB: ABNORMAL
EXT AST: 17 U/L (ref 10–35)
EXT BILIRUBIN DIRECT: ABNORMAL
EXT BILIRUBIN TOTAL: 3.5 MG/DL (ref 0.2–1.2)
EXT BK VIRUS DNA QN PCR: ABNORMAL
EXT BUN: 26 MG/DL (ref 7–25)
EXT C PEPTIDE: ABNORMAL
EXT CA 125: ABNORMAL
EXT CA 19-9: ABNORMAL
EXT CA 27-29: ABNORMAL
EXT CALCITONIN: ABNORMAL
EXT CALCIUM: 9.4 MG/DL (ref 8.6–10.4)
EXT CEA: ABNORMAL
EXT CHLORIDE: 109 MMOL/L (ref 98–110)
EXT CHOLESTEROL: ABNORMAL
EXT CHROMOGRANIN A: ABNORMAL
EXT CO2: 28 MMOL/L (ref 20–31)
EXT CREATININE UA: ABNORMAL
EXT CREATININE: 1.19 MG/DL (ref 0.5–1.05)
EXT CYCLOSPORONE LEVEL: ABNORMAL
EXT DOPAMINE: 20 PG/ML
EXT EBV DNA BY PCR: ABNORMAL
EXT EPINEPHRINE: 28 PG/ML
EXT FOLATE: ABNORMAL
EXT FREE T3: ABNORMAL
EXT FREE T4: ABNORMAL
EXT FSH: ABNORMAL
EXT GASTRIN RELEASING PEPTIDE: ABNORMAL
EXT GASTRIN RELEASING PEPTIDE: ABNORMAL
EXT GASTRIN: ABNORMAL
EXT GGT: ABNORMAL
EXT GHRELIN: ABNORMAL
EXT GLUCAGON: ABNORMAL
EXT GLUCOSE: 121 MG/DL (ref 65–139)
EXT GROWTH HORMONE: ABNORMAL
EXT HCV RNA QUANT PCR: ABNORMAL
EXT HDL: ABNORMAL
EXT HEMATOCRIT: 37.5 % (ref 35–45)
EXT HEMOGLOBIN A1C: ABNORMAL
EXT HEMOGLOBIN: 10.4 G/DL (ref 11.7–15.5)
EXT HISTAMINE 24 HR URINE: ABNORMAL
EXT HISTAMINE: ABNORMAL
EXT IGF-1: ABNORMAL
EXT IMMUNKNOW (NON-STIMULATED): ABNORMAL
EXT IMMUNKNOW (STIMULATED): ABNORMAL
EXT INR: ABNORMAL
EXT INSULIN: ABNORMAL
EXT LANREOTIDE LEVEL: ABNORMAL
EXT LDH, TOTAL: ABNORMAL
EXT LDL CHOLESTEROL: ABNORMAL
EXT LIPASE: ABNORMAL
EXT MAGNESIUM: ABNORMAL
EXT METANEPHRINE FREE PLASMA: 55 PG/ML (ref 0–57)
EXT MOTILIN: ABNORMAL
EXT NEUROKININ A CAMB: ABNORMAL
EXT NEUROKININ A ISI: ABNORMAL
EXT NEUROTENSIN: ABNORMAL
EXT NOREPINEPHRINE: 542 PG/ML
EXT NORMETANEPHRINE: 189 PG/ML (ref 0–148)
EXT NSE: ABNORMAL
EXT OCTREOTIDE LEVEL: ABNORMAL
EXT PANCREASTATIN CAMB: ABNORMAL
EXT PANCREASTATIN ISI: 50 PG/ML (ref 10–135)
EXT PANCREATIC POLYPEPTIDE: ABNORMAL
EXT PHOSPHORUS: ABNORMAL
EXT PLATELETS: 235 1000/UL (ref 140–400)
EXT POTASSIUM: 4.5 MMOL/L (ref 3.5–5.3)
EXT PROGRAF LEVEL: ABNORMAL
EXT PROLACTIN: ABNORMAL
EXT PROTEIN TOTAL: 6.3 G/DL (ref 6.1–8.1)
EXT PROTEIN UA: ABNORMAL
EXT PT: ABNORMAL
EXT PTH, INTACT: ABNORMAL
EXT PTT: ABNORMAL
EXT RAPAMUNE LEVEL: ABNORMAL
EXT SEROTONIN: ABNORMAL
EXT SODIUM: 146 MMOL/L (ref 135–146)
EXT SOMATOSTATIN: ABNORMAL
EXT SUBSTANCE P: ABNORMAL
EXT TRIGLYCERIDES: ABNORMAL
EXT TRYPTASE: ABNORMAL
EXT TSH: ABNORMAL
EXT URIC ACID: ABNORMAL
EXT URINE AMYLASE U/HR: ABNORMAL
EXT URINE AMYLASE U/L: ABNORMAL
EXT VASOACTIVE INTESTINAL POLYPEPTIDE: ABNORMAL
EXT VITAMIN B12: ABNORMAL
EXT VMA 24 HR URINE: ABNORMAL
EXT WBC: 3.9 1000/UL (ref 3.8–10.8)
NEURON SPECIFIC ENOLASE: ABNORMAL

## 2018-06-18 PROCEDURE — 99212 OFFICE O/P EST SF 10 MIN: CPT | Mod: NTX | Performed by: SURGERY

## 2018-06-18 NOTE — PROGRESS NOTES
"NOLANETS:  [facility]  A collaboration between [facility] and [facility]      PATIENT: Hafsa Hawley  MRN: [id number]  DATE: 6/18/2018    Subjective:      Chief Complaint: Consult (specialty service required)   she came here today for him for evaluation of the left right adrenal mass.  She's been having issues with the blood pressure.  Her blood pressure in the office was better than her blood pressures in the past.  She has been informed by her PCP that she has abdominal lesion that needs to be evaluated.  She denies any significant R excruciating pain arm.      Vitals: There were no vitals filed for this visit.     Karnofsky Score:     Diagnosis: No diagnosis found.     Oncologic History:     Interval History:     Past Medical History:  Past Medical History:   Diagnosis Date    Anemia     Anticoagulant long-term use     Arthritis     Atrial fibrillation     Chronic obstructive pulmonary disease with acute exacerbation     Congestive heart failure     COPD (chronic obstructive pulmonary disease)     Encounter for blood transfusion     GERD (gastroesophageal reflux disease)     Hypertension     Right kidney mass     Sleep apnea     Thyroid disease        Past Surgical History:  Past Surgical History:   Procedure Laterality Date    APPENDECTOMY      CARDIAC DEFIBRILLATOR PLACEMENT Left 12/2016    CARDIAC DEFIBRILLATOR PLACEMENT  2016    EYE SURGERY      FRACTURE SURGERY Left     hand 5th digit    HYSTERECTOMY      left knee mass removal Left        Family History:  No family history on file.    Allergies:  Review of patient's allergies indicates:   Allergen Reactions    Penicillins Hives    Iodinated contrast- oral and iv dye Nausea And Vomiting    Percocet [oxycodone-acetaminophen] Other (See Comments)     Nausea, Dizziness, Anxiety.  "I don't like how it makes me feel."   Given Hydromorphone 0.5mg " IVP  Without problems.    Diovan hct [valsartan-hydrochlorothiazide] Other (See Comments)     Causes coughing    Tramadol Nausea And Vomiting       Medications:  Current Outpatient Prescriptions   Medication Sig Dispense Refill    albuterol-ipratropium 2.5mg-0.5mg/3mL (DUO-NEB) 0.5 mg-3 mg(2.5 mg base)/3 mL nebulizer solution Take 1 mL by nebulization every 6 (six) hours as needed for Wheezing or Shortness of Breath. 1 Box 2    aspirin (ECOTRIN) 81 MG EC tablet Take 81 mg by mouth once daily.      atorvastatin (LIPITOR) 40 MG tablet Take 1 tablet (40 mg total) by mouth once daily. 90 tablet 3    b complex vitamins tablet Take 1 tablet by mouth once daily.      busPIRone (BUSPAR) 10 MG tablet Take 1 tablet (10 mg total) by mouth 2 (two) times daily. 60 tablet 1    carvedilol (COREG) 25 MG tablet Take 1 tablet (25 mg total) by mouth 2 (two) times daily. 60 tablet 11    cyanocobalamin, vitamin B-12, 50 mcg tablet Take 5 mcg by mouth once daily.      ELIQUIS 5 mg Tab TAKE 1 TABLET BY MOUTH TWICE DAILY 60 tablet 0    escitalopram oxalate (LEXAPRO) 20 MG tablet Take 1 tablet (20 mg total) by mouth once daily. 30 tablet 1    fluticasone-vilanterol (BREO ELLIPTA) 100-25 mcg/dose diskus inhaler Inhale 1 puff into the lungs once daily. Controller      furosemide (LASIX) 20 MG tablet Take 3 tablets (60 mg total) by mouth 3 (three) times daily. 270 tablet 1    guaifenesin/dextromethorphan (MUCINEX DM ORAL) Take by mouth as needed.      hydrALAZINE (APRESOLINE) 25 MG tablet TAKE 1 TABLET BY MOUTH TWICE DAILY 60 tablet 0    hydrOXYzine pamoate (VISTARIL) 50 MG Cap Take 1 capsule (50 mg total) by mouth every 8 (eight) hours as needed (anxiety/insomnia). 30 capsule 0    isosorbide dinitrate (ISORDIL) 20 MG tablet TAKE 1 TABLET BY MOUTH TWICE DAILY 60 tablet 0    NITROSTAT 0.4 mg SL tablet Take 2.5 tablets (1 mg total) by mouth every 5 (five) minutes as needed for Chest pain. No more than 3 tablets in 15 minutes.       pantoprazole (PROTONIX) 40 MG tablet Take 1 tablet by mouth once daily.  5    potassium chloride (KLOR-CON) 10 MEQ TbSR Take 1 tablet by mouth once daily.  10    spironolactone (ALDACTONE) 25 MG tablet Take 1 tablet (25 mg total) by mouth once daily. 30 tablet 1    VENTOLIN HFA 90 mcg/actuation inhaler Inhale 2 puffs into the lungs 2 (two) times daily as needed.   11    walker Misc 1 Device by Misc.(Non-Drug; Combo Route) route as needed.  0     No current facility-administered medications for this visit.        Review of Systems   Constitutional: Positive for activity change and fatigue. Negative for appetite change, chills, diaphoresis and unexpected weight change.   HENT: Negative for congestion, dental problem, drooling, ear discharge, ear pain, facial swelling, nosebleeds, postnasal drip, sneezing, sore throat and trouble swallowing.    Eyes: Negative for photophobia, pain, discharge, redness, itching and visual disturbance.   Respiratory: Positive for cough, choking, chest tightness and shortness of breath. Negative for apnea, wheezing and stridor.    Cardiovascular: Positive for leg swelling. Negative for chest pain and palpitations.   Gastrointestinal: Positive for abdominal distention and abdominal pain. Negative for nausea, rectal pain and vomiting.   Endocrine: Negative.    Genitourinary: Negative.    Musculoskeletal: Negative for joint swelling, myalgias, neck pain and neck stiffness.   Skin: Negative for color change, pallor and rash.   Allergic/Immunologic: Negative.    Neurological: Negative for dizziness, tremors, seizures, facial asymmetry, speech difficulty, weakness, light-headedness, numbness and headaches.   Hematological: Negative.    Psychiatric/Behavioral: Negative.  Negative for dysphoric mood.      Objective:      Physical Exam   Constitutional: She appears well-developed and well-nourished. No distress.   HENT:   Head: Normocephalic and atraumatic.   Right Ear: External ear normal.    Eyes: Conjunctivae are normal. Pupils are equal, round, and reactive to light.   Neck: Normal range of motion. No thyromegaly present.   Cardiovascular: Normal rate.    Pulmonary/Chest: Effort normal and breath sounds normal.   Abdominal: Soft. Bowel sounds are normal.   Musculoskeletal: Normal range of motion.   Skin: Skin is warm. She is not diaphoretic.   Psychiatric: She has a normal mood and affect. Her behavior is normal.      Assessment:       No diagnosis found.    Laboratory Data:   Results for OMERO WASHBURN (MRN [id number]) as of 6/18/2018 20:43   Ref. Range 6/11/2018 19:11 6/12/2018 05:02 6/13/2018 07:55 6/13/2018 13:09   WBC Latest Ref Range: 3.90 - 12.70 K/uL  5.71 5.41    RBC Latest Ref Range: 4.00 - 5.40 M/uL  5.32 5.54 (H)    Hemoglobin Latest Ref Range: 12.0 - 16.0 g/dL  9.5 (L) 10.1 (L)    Hematocrit Latest Ref Range: 37.0 - 48.5 %  31.4 (L) 32.6 (L)    MCV Latest Ref Range: 82 - 98 fL  59 (L) 59 (L)    MCH Latest Ref Range: 27.0 - 31.0 pg  17.9 (L) 18.2 (L)    MCHC Latest Ref Range: 32.0 - 36.0 g/dL  30.3 (L) 31.0 (L)    RDW Latest Ref Range: 11.5 - 14.5 %  27.8 (H) 27.5 (H)    Platelets Latest Ref Range: 150 - 350 K/uL  205 183    MPV Latest Ref Range: 9.2 - 12.9 fL  SEE COMMENT SEE COMMENT    Gran% Latest Ref Range: 38.0 - 73.0 %  74.0 (H) 60.6    Gran # (ANC) Latest Ref Range: 1.8 - 7.7 K/uL   3.3    Lymph% Latest Ref Range: 18.0 - 48.0 %  20.0 25.9    Lymph # Latest Ref Range: 1.0 - 4.8 K/uL   1.4    Mono% Latest Ref Range: 4.0 - 15.0 %  5.0 10.7    Mono # Latest Ref Range: 0.3 - 1.0 K/uL   0.6    Eosinophil% Latest Ref Range: 0.0 - 8.0 %  1.0 2.8    Eos # Latest Ref Range: 0.0 - 0.5 K/uL   0.2    Basophil% Latest Ref Range: 0.0 - 1.9 %  0.0 0.4    Baso # Latest Ref Range: 0.00 - 0.20 K/uL   0.02    nRBC Latest Ref Range: 0 /100 WBC  2@L=100 (A)     Ovalocytes Unknown  Occasional Occasional    Aniso Unknown  Slight Slight    Poik Unknown  Moderate Moderate    Poly Unknown  Occasional  Occasional    Hypo Unknown  Occasional Occasional    Platelet Estimate Unknown  Appears normal Appears normal    Acanthocytes Unknown  Present Present    Stef Cells Unknown  Occasional Occasional    Large/Giant Platelets Unknown   Present    Schistocytes Unknown  Present Present    Spherocytes Unknown  Occasional Occasional    Stomatocytes Unknown  Present Present    Target Cells Unknown  Occasional Occasional    Tear Drop Cells Unknown  Occasional Occasional    Haptoglobin Latest Ref Range: 30 - 250 mg/dL 55      Sodium Latest Ref Range: 136 - 145 mmol/L  140 140    Potassium Latest Ref Range: 3.5 - 5.1 mmol/L  3.4 (L) 3.8    Chloride Latest Ref Range: 95 - 110 mmol/L  102 103    CO2 Latest Ref Range: 23 - 29 mmol/L  30 (H) 30 (H)    Anion Gap Latest Ref Range: 8 - 16 mmol/L  8 7 (L)    BUN, Bld Latest Ref Range: 7 - 17 mg/dL  27 (H) 29 (H)    Creatinine Latest Ref Range: 0.50 - 1.40 mg/dL  1.23 1.19    eGFR if non African American Latest Ref Range: >60 mL/min/1.73 m^2  50.6 (A) 52.6 (A)    eGFR if African American Latest Ref Range: >60 mL/min/1.73 m^2  58.3 (A) >60.0    Glucose Latest Ref Range: 70 - 110 mg/dL  144 (H) 119 (H)    Calcium Latest Ref Range: 8.7 - 10.5 mg/dL  8.8 9.4    Alkaline Phosphatase Latest Ref Range: 38 - 126 U/L  46 45    Total Protein Latest Ref Range: 6.0 - 8.4 g/dL  6.0 6.4    Albumin Latest Ref Range: 3.5 - 5.2 g/dL  3.3 (L) 3.4 (L)    Total Bilirubin Latest Ref Range: 0.1 - 1.0 mg/dL  3.8 (H) 3.4 (H)    AST Latest Ref Range: 15 - 46 U/L  29 27    ALT Latest Ref Range: 10 - 44 U/L  33 27    LD Latest Ref Range: 110 - 260 U/L 308 (H)      Troponin I Latest Ref Range: 0.012 - 0.034 ng/mL 0.465 (H) 0.437 (H)     TSH Latest Ref Range: 0.400 - 4.000 uIU/mL 0.660      2D ECHO WITH COLOR FLOW DOPPLER Unknown    Rpt (A)   Differential Method Unknown  Manual Automated    EF Latest Ref Range: 55 - 65     25 (A)   Fragmented Cells Unknown  Occasional Occasional    Mitral Valve Regurgitation Unknown     MILD TO MODERATE   Pericardial Effusion Unknown    SMALL (A)   Result Notes     Notes Recorded by Mango Rowell MD on 6/8/2017 at 10:28 AM CDT  CT results          PACS Images     Show images for CT Abdomen Pelvis Without Contrast   Reviewed By Kelsea Nguyen Jr., MD on 6/12/2017 08:40   Mango Rowell MD on 6/8/2017 10:29   External Result Report     External Result Report   Narrative     CT abdomen pelvis without.    Findings: The visualized portion of the base of the lungs is significant for mild left basilar atelectatic change. The visualized portion of the heart is significant for partial visualization of cardiac defibrillator leads. There is minimal pericardial fluid. The stomach, spleen, pancreas, and liver are unremarkable. The gallbladder is absent. The left adrenal gland is unremarkable. There is a 4 cm right adrenal nodule which does not measure the attenuation of a lipid rich adenoma. This was present on previous CT dated 4/25/08 and measured 3 cm at that time. This nodule is unchanged compared to most recent CT dated 7/21/16. The left kidney is unremarkable. The right kidney contains a subcentimeter hyperdensity too small characterize unchanged compared to previous performed 7/21/16. There is no hydronephrosis or nephrolithiasis. The bladder is unremarkable. The uterus is absent or atrophic. The bowel is unremarkable. The osseous structures demonstrate degenerative change.   Impression      Right adrenal nodule which does not measure the attenuation of a lipid rich adenoma. This was present as far back as CT dated 4/25/08 but has mildly increased in size.          Impression: 52-year-old female with multiple medical problem status post pacemaker persistent shortness of breath and a lot of other medical problems was found to have a Paz 3.8 cm adrenal adenoma on the right side.  She also has been having problem with her blood pressure.  She is on multiple medications for blood pressure  and finally her blood pressure seems to be under under reasonable control.  She is now evaluated for endocrine causes of uncontrolled blood pressure.      She does not have any current scans are any lab parameters to confirm the diagnosis of pheochromocytoma.    I can see the CAT scan from a 2017 with saline is a the presence of 3.8 cm adrenal adenoma which is slightly increased in size compared to the findings in 2008.      Because of her multiple medical problem she does not appear to be a good surgical candidate.      First the issue that needs to be addressed is whether she has significant abnormality of the adrenal and second thing is the safety of resecting or ablating the lesion.      I clearly explained to her that a CAT scan needs to be done to us as the current size and also the basic biochemically does needs to be done to confirm whether she has elevated hormone levels.  I'll see her back in about 2 weeks' time and go over the whole thing with her   Plan:     1.  CT scan of the abdomen and pelvis with contrast to evaluate the adrenal lesion in the right side    2. Blood metanephrines and urine metanephrines for evaluation or to rule out pheochromocytoma causing Labil;e blood pressure  3.  Follow-up in 2 weeks and to assess the results and plan on future treatment and plan                DAVIDA Canas MD, FACS   Associate Professor of Surgery, [facility] [place]   Neuroendocrine Surgery, Hepatic/Pancreatic & General Surgery   [address]   [place] [address]   ph. [phone]; [phone]   fax. [phone]

## 2018-06-18 NOTE — LETTER
June 18, 2018      Kathi Burgess MD  [address]  [facility] [address]           [facility] [address] [place] [address]  Phone: [phone]  Fax: [phone]          Patient: Hafsa Hawley   MR Number: [id number]   YOB: 1965   Date of Visit: 6/18/2018       Dear Dr. Kathi Burgess:    Thank you for referring Hafsa Hawley to me for evaluation. Attached you will find relevant portions of my assessment and plan of care.    If you have questions, please do not hesitate to call me. I look forward to following Hafsa Hawley along with you.    Sincerely,    Missael Landry MD    Enclosure  CC:  No Recipients    If you would like to receive this communication electronically, please contact [email or web address] or [phone] to request more information on [facility] [employer] access.    For providers and/or their staff who would like to refer a patient to [facility], please contact us through our one-stop-shop provider referral line, [facility], at [phone].    If you feel you have received this communication in error or would no longer like to receive these types of communications, please e-mail [email or web address]

## 2018-06-18 NOTE — PATIENT INSTRUCTIONS
CT scan scheduled for Next Monday at [facility]   Labs today, [facility], [address]  Return to clinic in 2 weeks -- appointment made

## 2018-06-21 ENCOUNTER — HOSPITAL ENCOUNTER (OUTPATIENT)
Dept: PULMONOLOGY | Facility: CLINIC | Age: 53
Discharge: HOME | End: 2018-06-21
Attending: INTERNAL MEDICINE
Payer: MEDICAID

## 2018-06-21 ENCOUNTER — INITIAL CONSULT (OUTPATIENT)
Dept: TRANSPLANT | Facility: CLINIC | Age: 53
End: 2018-06-21
Attending: INTERNAL MEDICINE
Payer: MEDICAID

## 2018-06-21 VITALS
DIASTOLIC BLOOD PRESSURE: 84 MMHG | WEIGHT: 193.31 LBS | HEART RATE: 66 BPM | BODY MASS INDEX: 31.07 KG/M2 | SYSTOLIC BLOOD PRESSURE: 128 MMHG | WEIGHT: 195 LBS | BODY MASS INDEX: 31.34 KG/M2 | HEIGHT: 66 IN | HEIGHT: 66 IN

## 2018-06-21 DIAGNOSIS — R17 ELEVATED BILIRUBIN: ICD-10-CM

## 2018-06-21 DIAGNOSIS — G47.33 OSA (OBSTRUCTIVE SLEEP APNEA): ICD-10-CM

## 2018-06-21 DIAGNOSIS — I10 ESSENTIAL HYPERTENSION: ICD-10-CM

## 2018-06-21 DIAGNOSIS — N18.30 CHRONIC KIDNEY DISEASE, STAGE III (MODERATE): ICD-10-CM

## 2018-06-21 DIAGNOSIS — F32.A ANXIETY AND DEPRESSION: ICD-10-CM

## 2018-06-21 DIAGNOSIS — I48.0 PAROXYSMAL ATRIAL FIBRILLATION: ICD-10-CM

## 2018-06-21 DIAGNOSIS — I42.0 DILATED CARDIOMYOPATHY: ICD-10-CM

## 2018-06-21 DIAGNOSIS — F41.9 ANXIETY AND DEPRESSION: ICD-10-CM

## 2018-06-21 DIAGNOSIS — I50.42 CHRONIC COMBINED SYSTOLIC AND DIASTOLIC CONGESTIVE HEART FAILURE: Primary | ICD-10-CM

## 2018-06-21 DIAGNOSIS — D35.01 PHEOCHROMOCYTOMA OF RIGHT ADRENAL GLAND: ICD-10-CM

## 2018-06-21 DIAGNOSIS — I50.9 CONGESTIVE HEART FAILURE, UNSPECIFIED HF CHRONICITY, UNSPECIFIED HEART FAILURE TYPE: ICD-10-CM

## 2018-06-21 PROBLEM — I50.23 ACUTE ON CHRONIC SYSTOLIC HEART FAILURE: Status: RESOLVED | Noted: 2017-12-19 | Resolved: 2018-06-21

## 2018-06-21 PROCEDURE — 99999 PR PBB SHADOW E&M-EST. PATIENT-LVL III: CPT | Mod: PBBFAC,TXP,, | Performed by: INTERNAL MEDICINE

## 2018-06-21 PROCEDURE — 99213 OFFICE O/P EST LOW 20 MIN: CPT | Mod: PBBFAC,TXP | Performed by: INTERNAL MEDICINE

## 2018-06-21 PROCEDURE — 94618 PULMONARY STRESS TESTING: CPT | Mod: PBBFAC,NTX | Performed by: INTERNAL MEDICINE

## 2018-06-21 PROCEDURE — 99204 OFFICE O/P NEW MOD 45 MIN: CPT | Mod: S$PBB,TXP,, | Performed by: INTERNAL MEDICINE

## 2018-06-21 PROCEDURE — 94618 PULMONARY STRESS TESTING: CPT | Mod: 26,S$PBB,NTX, | Performed by: INTERNAL MEDICINE

## 2018-06-21 RX ORDER — CARVEDILOL 25 MG/1
1 TABLET ORAL 2 TIMES DAILY
Status: ON HOLD | COMMUNITY
End: 2020-08-16 | Stop reason: SDUPTHER

## 2018-06-21 NOTE — PROCEDURES
Hafsa Hawley is a 52 y.o.  female patient, who presents for a 6 minute walk test ordered by Jake Orozco MD.  The diagnosis is Congestive Heart Failure.  The patient's BMI is 31.5 kg/m2.  Predicted distance (lower limit of normal) is 378.28 meters.      Test Results:    The test was completed without stopping.  The total time walked was 360 seconds.  During walking, the patient reported:  Chest pain, Dyspnea, Leg pain, Lightheadedness.  The patient used a walker for assistance during testing.     06/21/2018---------Distance: 292.61 meters (960 feet)     O2 Sat % Supplemental Oxygen Heart Rate Blood Pressure Haseeb Scale   Pre-exercise  (Resting) 98 % Room Air 70 bpm 173/97 mmHg 0.5   During Exercise 95 % Room Air 80 bpm 143/100 mmHg 5-6   Post-exercise  (Recovery) 98 % Room Air  68 bpm 141/93 mmHg      Recovery Time:  175 seconds    Performing nurse/tech:  DOMENICO Oquendo.      PREVIOUS STUDY:   The patient has not had a previous study.      CLINICAL INTERPRETATION:  Six minute walk distance is 292.61 meters (960 feet) with heavy dyspnea.  During exercise, there was desaturation while breathing room air.  Both blood pressure and heart rate remained stable with walking.  Hypertension was present prior to exercise.  The patient reported non-pulmonary symptoms during exercise.  Significant exercise impairment is likely due to cardiovascular causes and subjective symptoms.  The patient did complete the study, walking 360 seconds of the 360 second test.  No previous study performed.  Based upon age and body mass index, exercise capacity is less than predicted.

## 2018-06-21 NOTE — PROGRESS NOTES
Subjective:   Initial evaluation of heart transplant candidacy.    HPI:  Ms. Hawley is a 52 y.o. year old Black female is followed by Dr. Cortez but referred by Dr. Pacheco after recent hosp stay for CHF to be considered for advanced surgical options (LVAD/OHT).  She is undergoing evaluation of adrenal tumor and recently saw surgery and told that she will require adrenalectomy--laparoscopic (her hx).  She reports that she has undergone evaluation to see if pheochromocytoma without definite dx and seeing an Endocrinologist at request of Dr. Cortez.  She tells me that she has fought CHF for 5 yrs and is admitted 3-5x/y every yr since dx made.  She has chronic LAGUERRE NYHA Class 3, 2-3 pillow orthopnea, wakes up SOB at times but relieved by rolling over in bed.  No symptomatic arrhythmia.  She has ICD does not recall shock.    Past Medical History:   Diagnosis Date    Anemia     Anticoagulant long-term use     Arthritis     Atrial fibrillation     Congestive heart failure     COPD (chronic obstructive pulmonary disease)     GERD (gastroesophageal reflux disease)     Hypertension     Pheochromocytoma, malignant--FROM HX TODAY NOT A DEFINITE DX     Sleep apnea     Thyroid disease      Past Surgical History:   Procedure Laterality Date    APPENDECTOMY      CARDIAC DEFIBRILLATOR PLACEMENT Left 12/2016    EYE SURGERY      FRACTURE SURGERY Left     hand 5th digit    HYSTERECTOMY      left knee hematoma drainage  Left      Problem Relation    Cancer Mother       Heart disease Father       Hypertension Father    Heart disease Sister    Heart disease Brother    Heart disease Sister    Heart disease Brother    Hypertension Brother    Diabetes Brother     Social History    Marital status:      Social History Main Topics    Smoking status: Never Smoker    Smokeless tobacco: Never Used    Alcohol use No      Comment: up to 1 yr ago drank 2-3 drinks on occasion but sporadic    Drug use: No    Sexual  activity: Yes     Partners: Male     Social History Narrative    On disability since 2013       Medication Sig    albuterol-ipratropium 2.5mg-0.5mg/3mL (DUO-NEB) 0.5 mg-3 mg(2.5 mg base)/3 mL nebulizer solution Take 1 mL by nebulization every 6 (six) hours as needed for Wheezing or Shortness of Breath.    aspirin (ECOTRIN) 81 MG EC tablet Take 81 mg by mouth once daily.    atorvastatin (LIPITOR) 40 MG tablet Take 1 tablet (40 mg total) by mouth once daily.    b complex vitamins tablet Take 1 tablet by mouth once daily.    busPIRone (BUSPAR) 10 MG tablet Take 1 tablet (10 mg total) by mouth 2 (two) times daily.    carvedilol (COREG) 25 MG tablet Take 1 tablet by mouth 2 (two) times daily.    cyanocobalamin, vitamin B-12, 50 mcg tablet Take 5 mcg by mouth once daily.    ELIQUIS 5 mg Tab TAKE 1 TABLET BY MOUTH TWICE DAILY    fluticasone-vilanterol (BREO ELLIPTA) 100-25 mcg/dose diskus inhaler Inhale 1 puff into the lungs once daily. Controller    furosemide (LASIX) 20 MG tablet Take 3 tablets (60 mg total) by mouth 3 (three) times daily.    guaifenesin/dextromethorphan (MUCINEX DM ORAL) Take by mouth as needed.    hydrALAZINE (APRESOLINE) 25 MG tablet TAKE 1 TABLET BY MOUTH TWICE DAILY    hydrOXYzine pamoate (VISTARIL) 50 MG Cap Take 1 capsule (50 mg total) by mouth every 8 (eight) hours as needed (anxiety/insomnia).    isosorbide dinitrate (ISORDIL) 20 MG tablet TAKE 1 TABLET BY MOUTH TWICE DAILY    NITROSTAT 0.4 mg SL tablet Take 2.5 tablets (1 mg total) by mouth every 5 (five) minutes as needed for Chest pain. No more than 3 tablets in 15 minutes.    pantoprazole (PROTONIX) 40 MG tablet Take 1 tablet by mouth once daily.    potassium chloride (KLOR-CON) 10 MEQ TbSR Take 1 tablet by mouth once daily.    spironolactone (ALDACTONE) 25 MG tablet Take 1 tablet (25 mg total) by mouth once daily.    VENTOLIN HFA 90 mcg/actuation inhaler Inhale 2 puffs into the lungs 2 (two) times daily as needed.      "walker Misc 1 Device by Misc.(Non-Drug; Combo Route) route as needed.    escitalopram oxalate (LEXAPRO) 20 MG tablet Take 1 tablet (20 mg total) by mouth once daily.     Review of patient's allergies indicates:   Allergen Reactions    Penicillins Hives    Percocet [oxycodone-acetaminophen] Other (See Comments)     Nausea, Dizziness, Anxiety.  "I don't like how it makes me feel."   Given Hydromorphone 0.5mg IVP  Without problems.    Diovan hct [valsartan-hydrochlorothiazide] Other (See Comments)     Causes coughing    Tramadol Nausea And Vomiting     Review of Systems   Constitution: Negative for chills, fever, weakness, malaise/fatigue, night sweats, weight gain and weight loss.   Cardiovascular: Positive for dyspnea on exertion and orthopnea. Negative for chest pain, irregular heartbeat, leg swelling, near-syncope, palpitations, paroxysmal nocturnal dyspnea and syncope.   Respiratory: Positive for cough and sputum production. Negative for wheezing.    Endocrine:        Undergoing evaluation for pheo   Hematologic/Lymphatic: Does not bruise/bleed easily.   Musculoskeletal: Negative for arthritis, joint pain and stiffness.   Gastrointestinal: Positive for nausea (sometimes with meds). Negative for change in bowel habit, dysphagia, heartburn, hematochezia, melena and vomiting. Hematemesis: on PPI says given for c/o nausea with meds denies dyspepsia.   Genitourinary: Negative for hematuria.   Neurological: Negative for brief paralysis, focal weakness and seizures.   Psychiatric/Behavioral: Positive for depression (she does not feel at this time on treatment) and suicidal ideas (she denies having at recent hospital stay but see notes). The patient is nervous/anxious.        Objective:   Blood pressure 128/84, pulse 66, height 5' 6" (1.676 m), weight 87.7 kg (193 lb 5.5 oz).body mass index is 31.21 kg/m².  Physical Exam   Constitutional: She is oriented to person, place, and time. She appears well-developed and " "well-nourished. No distress.   /84   Pulse 66   Ht 5' 6" (1.676 m)   Wt 87.7 kg (193 lb 5.5 oz)   LMP  (LMP Unknown)   BMI 31.21 kg/m²   Obese BF in NAD   HENT:   Head: Normocephalic and atraumatic.   Eyes: Conjunctivae are normal. Right eye exhibits no discharge. Left eye exhibits no discharge. No scleral icterus.   Neck: No JVD present. No thyromegaly present.   Cardiovascular: Normal rate, regular rhythm and normal heart sounds.  Exam reveals no gallop and no friction rub.    No murmur heard.  Pulmonary/Chest: Effort normal and breath sounds normal.   Abdominal: Soft. Bowel sounds are normal. She exhibits no distension. There is no tenderness.   I did not palpate abdomen due to pheo concern; no bruits   Musculoskeletal: She exhibits no edema or tenderness.   Neurological: She is alert and oriented to person, place, and time.   Skin: Skin is warm and dry. She is not diaphoretic.   Psychiatric: She has a normal mood and affect. Her behavior is normal. Thought content normal.     Lab Results   Component Value Date     06/21/2018    K 4.5 06/21/2018    MG 2.1 05/10/2018     06/21/2018    CO2 28 06/21/2018    BUN 23 (H) 06/21/2018    CREATININE 1.4 06/21/2018     (H) 06/21/2018    HGBA1C 5.1 05/10/2018    AST 17 06/21/2018    ALT 13 06/21/2018    ALBUMIN 3.7 06/21/2018    PROT 7.0 06/21/2018    BILITOT 4.5 (H) 06/21/2018    WBC 4.31 06/21/2018    HGB 10.5 (L) 06/21/2018    HCT 35.4 (L) 06/21/2018     06/21/2018    INR 1.1 06/11/2018     (H) 06/11/2018    TSH 1.939 06/21/2018    CHOL 165 12/07/2017    HDL 54 12/07/2017    LDLCALC 77.8 12/07/2017    TRIG 166 (H) 12/07/2017    U6OKCMV 8.4 09/11/2017    FREET4 1.07 05/09/2018     Lab Results   Component Value Date    BNP 1,066 (H) 06/21/2018    BNP 2,839 (H) 03/16/2018    BNP 2,547 (H) 12/19/2017     EKG 6/11/18 NSR, ST-T CHANGES, LAE    ECHO 6/13/18 CONCLUSIONS     1 - Enlarged left ventricular enlargement.     2 - Concentric " "hypertrophy.     3 - Biatrial enlargement.     4 - Severely depressed left ventricular systolic function (EF 25-30%).     5 - Indeterminate LV diastolic function.     6 - Normal right ventricular systolic function .     7 - Mild to moderate mitral regurgitation.     8 - Trivial pulmonic regurgitation.     9 - Small pericardial effusion.     10 - Intermediate central venous pressure.     Assessment:      1. Chronic combined systolic and diastolic congestive heart failure    2. Chronic kidney disease, stage III (moderate)    3. Dilated cardiomyopathy    4. Elevated bilirubin    5. Essential hypertension    6. Paroxysmal atrial fibrillation    7. MELISSA (obstructive sleep apnea)    8. Anxiety and depression    9. Pheochromocytoma of right adrenal gland WORKING DX NOT DEFINITE DX        Plan:   Medication recommendations for Dr. Cortez to consider at upcoming visit:  Initiate ACE, ARB or ARNI therapy and up-titrate every 2 weeks until at target dose--if BP a concern this therapy would take priority over hydralazine and isosorbide even in  [place]  Once this accomplished if renal function and K allow replace KCl with spironolactone  If she remains with NYHA Class 3 symptoms after above therapies then up-titrate apresoline to 75 mg TID and isosorbide dinitrate 40 mg TID (alternatively 50/40 mg four times a day)  Digoxin can be added for symptoms using 0.125 mg daily and check level 2 weeks later to assure < 1.0     Patient will need to complete surgery and then should return for further evaluation.  If she indeed had pheo this could explain CM but based upon available labs I doubt she has pheochromocytoma.  She has extensive hx of "heart attacks" with family members in 30's but I wonder if these are true MI's.  She is followed by Dr. Cortez and I will send this note to him.    Patient is now NYHA III ACC stage D  Recommend 2 gram sodium restriction and 1500cc fluid restriction.  Encourage physical activity with graded " exercise program.  Requested patient to weigh themselves daily, and to notify us if their weight increases by more than 3 lbs in 1 day or 5 lbs in 1 week.     Transplant Candidacy: Patient is a 52 y.o. year old female with heart failure is being seen for possible LVAD and OHT. In my opinion, she is  a suitable LVAD and OHT candidate assuming LV is indeed dilated--see series of echo reports. Patient did not meet with MCS and/or pre-transplant coordinator at the end of this visit and is not scheduled for risk stratification testing in view of upcoming surgery. The patient will follow up with pre-transplant.and can be seen with HTS at satellite clinic in [place].  Would transfer to [facility] once satellite HTS clinic opens there as closer for her.  She will continue under the care of Dr. Cortez.    ERVIN Patient Status  Functional Status: 70% - Cares for self: unable to carry on normal activity or active work  Physical Capacity: No Limitations  Working for Income: No  If no, reason not working: Disability    Jake Orozco Jr, MD

## 2018-06-21 NOTE — LETTER
June 21, 2018        Andrea Pacheco  [address]  [address]  [place] [place] [facility] [address]  Phone: [phone]  Fax: [phone]             [facility] [address] [place] [address]  Phone: [phone]   Patient: Hafsa Hawley   MR Number: [id number]   YOB: 1965   Date of Visit: 6/21/2018       Dear Dr. Andrea Pacheco    Thank you for referring Hafsa Hawley to me for evaluation. Attached you will find relevant portions of my assessment and plan of care.    If you have questions, please do not hesitate to call me. I look forward to following Hafsa Hawley along with you.    Sincerely,    Jake Orozco Jr, MD    Enclosure    If you would like to receive this communication electronically, please contact [email or web address] or [phone] to request TapTap Link access.    TapTap Link is a tool which provides read-only access to select patient information with whom you have a relationship. Its easy to use and provides real time access to review your patients record including encounter summaries, notes, results, and demographic information.    If you feel you have received this communication in error or would no longer like to receive these types of communications, please e-mail [email or web address]

## 2018-07-02 ENCOUNTER — OFFICE VISIT (OUTPATIENT)
Dept: NEUROLOGY | Facility: HOSPITAL | Age: 53
End: 2018-07-02
Attending: SURGERY
Payer: MEDICAID

## 2018-07-02 VITALS
HEIGHT: 66 IN | WEIGHT: 198.94 LBS | DIASTOLIC BLOOD PRESSURE: 87 MMHG | TEMPERATURE: 98 F | SYSTOLIC BLOOD PRESSURE: 135 MMHG | BODY MASS INDEX: 31.97 KG/M2 | HEART RATE: 88 BPM

## 2018-07-02 DIAGNOSIS — E27.8 ADRENAL INCIDENTALOMA: Primary | ICD-10-CM

## 2018-07-02 PROCEDURE — 99214 OFFICE O/P EST MOD 30 MIN: CPT | Mod: NTX | Performed by: SURGERY

## 2018-07-02 RX ORDER — ALPRAZOLAM 1 MG/1
1 TABLET ORAL NIGHTLY PRN
Status: ON HOLD | COMMUNITY
End: 2020-09-04 | Stop reason: HOSPADM

## 2018-07-02 NOTE — PROGRESS NOTES
"NOLANETS:  [facility]  A collaboration between [facility] and [facility]      PATIENT: Hafsa Hawley  MRN: [id number]  DATE: 2018    Subjective:      Chief Complaint: Follow-up (f/u after ct scan)   she has had a CT scan and labs  No change in overall condition  Continues to have shortness of breath on activities  Has seen a cardiologist recently for heart transplant evaluation    Vitals:   Vitals:    18 1258   BP: 135/87   Pulse: 88   Temp: 98 °F (36.7 °C)   TempSrc: Oral   Weight: 90.2 kg (198 lb 15.4 oz)   Height: 5' 6" (1.676 m)        Karnofsky Score:     Diagnosis: No diagnosis found.     Oncologic History:     Interval History:     Past Medical History:  Past Medical History:   Diagnosis Date    Anemia     Anticoagulant long-term use     Arthritis     Atrial fibrillation     Congestive heart failure     COPD (chronic obstructive pulmonary disease)     COPD (chronic obstructive pulmonary disease)     Encounter for blood transfusion     GERD (gastroesophageal reflux disease)     Hypertension     Pheochromocytoma, malignant     Right kidney mass     Sleep apnea     Thyroid disease        Past Surgical History:  Past Surgical History:   Procedure Laterality Date    APPENDECTOMY      CARDIAC DEFIBRILLATOR PLACEMENT Left 2016    CARDIAC DEFIBRILLATOR PLACEMENT  2016    EYE SURGERY      due to running tears    FRACTURE SURGERY Left     hand 5th digit    HYSTERECTOMY      KNEE SURGERY Left 2016    hematoma       Family History:  Family History   Problem Relation Age of Onset    Cancer Mother         pancreatic CA early 50's    Heart disease Father          MI in late 50's    Hypertension Father     Heart disease Sister     Heart disease Brother     Heart disease Sister     Heart disease Brother     Hypertension Brother     Diabetes Brother        Allergies:  Review of patient's " "allergies indicates:   Allergen Reactions    Penicillins Hives    Percocet [oxycodone-acetaminophen] Other (See Comments)     Nausea, Dizziness, Anxiety.  "I don't like how it makes me feel."   Given Hydromorphone 0.5mg IVP  Without problems.    Diovan hct [valsartan-hydrochlorothiazide] Other (See Comments)     Causes coughing    Tramadol Nausea And Vomiting       Medications:  Current Outpatient Prescriptions   Medication Sig Dispense Refill    albuterol-ipratropium 2.5mg-0.5mg/3mL (DUO-NEB) 0.5 mg-3 mg(2.5 mg base)/3 mL nebulizer solution Take 1 mL by nebulization every 6 (six) hours as needed for Wheezing or Shortness of Breath. 1 Box 2    ALPRAZolam (XANAX) 1 MG tablet Take 1 mg by mouth nightly as needed for Anxiety.      aspirin (ECOTRIN) 81 MG EC tablet Take 81 mg by mouth once daily.      atorvastatin (LIPITOR) 40 MG tablet Take 1 tablet (40 mg total) by mouth once daily. 90 tablet 3    b complex vitamins tablet Take 1 tablet by mouth once daily.      carvedilol (COREG) 25 MG tablet Take 1 tablet by mouth 2 (two) times daily.      cyanocobalamin, vitamin B-12, 50 mcg tablet Take 5 mcg by mouth once daily.      ELIQUIS 5 mg Tab TAKE 1 TABLET BY MOUTH TWICE DAILY 60 tablet 0    fluticasone-vilanterol (BREO ELLIPTA) 100-25 mcg/dose diskus inhaler Inhale 1 puff into the lungs once daily. Controller      furosemide (LASIX) 20 MG tablet Take 3 tablets (60 mg total) by mouth 3 (three) times daily. 270 tablet 1    hydrALAZINE (APRESOLINE) 25 MG tablet TAKE 1 TABLET BY MOUTH TWICE DAILY 60 tablet 0    isosorbide dinitrate (ISORDIL) 20 MG tablet TAKE 1 TABLET BY MOUTH TWICE DAILY 60 tablet 0    NITROSTAT 0.4 mg SL tablet Take 2.5 tablets (1 mg total) by mouth every 5 (five) minutes as needed for Chest pain. No more than 3 tablets in 15 minutes.      pantoprazole (PROTONIX) 40 MG tablet Take 1 tablet by mouth once daily.  5    potassium chloride (KLOR-CON) 10 MEQ TbSR Take 1 tablet by mouth once " daily.  10    spironolactone (ALDACTONE) 25 MG tablet Take 1 tablet (25 mg total) by mouth once daily. 30 tablet 1    VENTOLIN HFA 90 mcg/actuation inhaler Inhale 2 puffs into the lungs 2 (two) times daily as needed.   11    walker Misc 1 Device by Misc.(Non-Drug; Combo Route) route as needed.  0     No current facility-administered medications for this visit.        Review of Systems   Constitutional: Positive for activity change and fatigue. Negative for appetite change, chills, diaphoresis, fever and unexpected weight change.   HENT: Negative for dental problem, drooling, ear pain, hearing loss, nosebleeds, postnasal drip, rhinorrhea, sinus pain, sneezing, sore throat, tinnitus and trouble swallowing.    Eyes: Negative for photophobia, pain, redness, itching and visual disturbance.   Respiratory: Positive for chest tightness and shortness of breath.    Cardiovascular: Positive for chest pain and leg swelling. Negative for palpitations.   Gastrointestinal: Negative for abdominal pain, anal bleeding, blood in stool, constipation, diarrhea, nausea, rectal pain and vomiting.   Endocrine: Negative.    Genitourinary: Negative.    Musculoskeletal: Negative.    Allergic/Immunologic: Negative.    Neurological: Negative for dizziness, tremors, seizures, syncope, facial asymmetry, speech difficulty, weakness, light-headedness, numbness and headaches.   Hematological: Negative.       Objective:      Physical Exam   Constitutional: She is oriented to person, place, and time. No distress.   Came with a walker short of breath after taking a few steps   HENT:   Head: Normocephalic and atraumatic.   Right Ear: External ear normal.   Eyes: Conjunctivae and EOM are normal. Pupils are equal, round, and reactive to light.   Neck: Normal range of motion. No thyromegaly present.   Cardiovascular: Normal rate and regular rhythm.    Pulmonary/Chest: Effort normal and breath sounds normal.   Abdominal: Soft. Bowel sounds are normal.  She exhibits no mass. There is no tenderness. There is no rebound and no guarding.   Musculoskeletal: Normal range of motion.   Neurological: She is alert and oriented to person, place, and time.   Skin: Skin is warm. She is not diaphoretic.   Psychiatric: She has a normal mood and affect. Her behavior is normal.      Assessment:       No diagnosis found.    Laboratory Data:   Results for OMERO WASHBURN (MRN [id number]) as of 7/2/2018 13:25   Ref. Range 6/13/2018 13:09 6/18/2018 00:00 6/21/2018 07:32 6/25/2018 10:17   WBC Latest Ref Range: 3.90 - 12.70 K/uL   4.31    RBC Latest Ref Range: 4.00 - 5.40 M/uL   5.97 (H)    Hemoglobin Latest Ref Range: 12.0 - 16.0 g/dL   10.5 (L)    Hematocrit Latest Ref Range: 37.0 - 48.5 %   35.4 (L)    MCV Latest Ref Range: 82 - 98 fL   59 (L)    MCH Latest Ref Range: 27.0 - 31.0 pg   17.6 (L)    MCHC Latest Ref Range: 32.0 - 36.0 g/dL   29.7 (L)    RDW Latest Ref Range: 11.5 - 14.5 %   26.3 (H)    Platelets Latest Ref Range: 150 - 350 K/uL   197    MPV Latest Ref Range: 9.2 - 12.9 fL   SEE COMMENT    Gran% Latest Ref Range: 38.0 - 73.0 %   58.7    Gran # (ANC) Latest Ref Range: 1.8 - 7.7 K/uL   2.5    Immature Granulocytes Latest Ref Range: 0.0 - 0.5 %   0.2    Immature Grans (Abs) Latest Ref Range: 0.00 - 0.04 K/uL   0.01    Lymph% Latest Ref Range: 18.0 - 48.0 %   28.1    Lymph # Latest Ref Range: 1.0 - 4.8 K/uL   1.2    Mono% Latest Ref Range: 4.0 - 15.0 %   9.3    Mono # Latest Ref Range: 0.3 - 1.0 K/uL   0.4    Eosinophil% Latest Ref Range: 0.0 - 8.0 %   2.8    Eos # Latest Ref Range: 0.0 - 0.5 K/uL   0.1    Basophil% Latest Ref Range: 0.0 - 1.9 %   0.9    Baso # Latest Ref Range: 0.00 - 0.20 K/uL   0.04    nRBC Latest Ref Range: 0 /100 WBC   0    Ovalocytes Unknown   Occasional    Aniso Unknown   Moderate    Poik Unknown   Moderate    Poly Unknown   Occasional    Hypo Unknown   Moderate    Platelet Estimate Unknown   Appears normal    Spherocytes Unknown   Occasional    Target  Cells Unknown   Occasional    Tear Drop Cells Unknown   Occasional    Sodium Latest Ref Range: 136 - 145 mmol/L   144    Potassium Latest Ref Range: 3.5 - 5.1 mmol/L   4.5    Chloride Latest Ref Range: 95 - 110 mmol/L   105    CO2 Latest Ref Range: 23 - 29 mmol/L   28    Anion Gap Latest Ref Range: 8 - 16 mmol/L   11    BUN, Bld Latest Ref Range: 6 - 20 mg/dL   23 (H)    Creatinine Latest Ref Range: 0.5 - 1.4 mg/dL   1.4    eGFR if non African American Latest Ref Range: >60 mL/min/1.73 m^2   43.2 (A)    eGFR if African American Latest Ref Range: >60 mL/min/1.73 m^2   49.8 (A)    Glucose Latest Ref Range: 70 - 110 mg/dL   149 (H)    Calcium Latest Ref Range: 8.7 - 10.5 mg/dL   9.7    Alkaline Phosphatase Latest Ref Range: 55 - 135 U/L   58    Total Protein Latest Ref Range: 6.0 - 8.4 g/dL   7.0    Albumin Latest Ref Range: 3.5 - 5.2 g/dL   3.7    Total Bilirubin Latest Ref Range: 0.1 - 1.0 mg/dL   4.5 (H)    AST Latest Ref Range: 10 - 40 U/L   17    ALT Latest Ref Range: 10 - 44 U/L   13    BNP Latest Ref Range: 0 - 99 pg/mL   1,066 (H)    TSH Latest Ref Range: 0.400 - 4.000 uIU/mL   1.939    EXT Albumin Latest Ref Range: 3.6 - 5.1 g/dl  3.9     EXT Alkaline Phosphatase Latest Ref Range: 33 - 130 u/l  52     EXT ALT Latest Ref Range: 6 - 29 u/l  14     EXT AST Latest Ref Range: 10 - 35 u/l  17     EXT BilirubiN Total Latest Ref Range: 0.2 - 1.2 mg/dl  3.5 (A)     EXT BUN Latest Ref Range: 7 - 25 mg/dl  26 (A)     EXT Calcium Latest Ref Range: 8.6 - 10.4 mg/dl  9.4     EXT Chloride Latest Ref Range: 98 - 110 mmol/l  109     EXT CO2 Latest Ref Range: 20 - 31 mmol/l  28     EXT Creatinine Latest Ref Range: 0.50 - 1.05 mg/dl  1.19 (A)     ext dopamine Latest Units: pg/ml  20     ext epinephrine Latest Units: pg/ml  28     EXT Glucose Latest Ref Range: 65 - 139 mg/dl  121     EXT Hematocrit Latest Ref Range: 35.0 - 45.0 %  37.5     EXT Hemoglobin Latest Ref Range: 11.7 - 15.5 g/dl  10.4 (A)     ext metanephrine free  plasma Latest Ref Range: 0 - 57 pg/ml  55     EXT NOREPINEPHRINE Latest Units: pg/ml  542     ext normetanephrine Latest Ref Range: 0 - 148 pg/ml  189 (A)     EXT PANCREASTATIN GIUSEPPE Latest Ref Range: 10 - 135 pg/ml  50     EXT Platelets Latest Ref Range: 140 - 400 1000/ul  235     EXT Potassium Latest Ref Range: 3.5 - 5.3 mmol/l  4.5     EXT Protein total Latest Ref Range: 6.1 - 8.1 g/dl  6.3     EXT Sodium Latest Ref Range: 135 - 146 mmol/l  146     EXT WBC Latest Ref Range: 3.8 - 10.8 1000/ul  3.9     CT ABDOMEN PELVIS W WO CONTRAST Unknown    Rpt   2D ECHO WITH COLOR FLOW DOPPLER Unknown Rpt (A)      Differential Method Unknown   Automated    EF Latest Ref Range: 55 - 65  25 (A)      Fragmented Cells Unknown   Occasional    Mitral Valve Regurgitation Unknown MILD TO MODERATE      Pericardial Effusion Unknown SMALL (A)           Narrative     EXAMINATION:  CT ABDOMEN PELVIS W WO CONTRAST    CLINICAL HISTORY:  pheochromocytoma;Benign neoplasm of right adrenal gland    TECHNIQUE:  Low dose axial images, sagittal and coronal reformations were obtained from the lung bases to the pubic symphysis before and following the IV administration of 100 mL of Omnipaque 350.    FINDINGS:  The visualized portion of the base of the lungs is unremarkable.  The visualized portion of the heart is significant for partial visualization of a cardiac defibrillator lead.  The heart is enlarged.  The stomach, spleen, pancreas, and liver are unremarkable.  The gallbladder is absent.  The left adrenal gland is unremarkable.  There is a 4 cm right adrenal nodule unchanged in size compared to previous CT dated 05/24/2017.  This nodule does not measure the attenuation of a lipid rich adenoma.  The left kidney is unremarkable.  There is a 1.3 cm hyperdense cyst within the inferior right kidney the.  The kidneys appropriately concentrate and excrete contrast on the delayed images.  The bladder is unremarkable.  The uterus is absent or atrophic.   The bowel has a normal appearance.  The osseous structures demonstrate degenerative change.   Impression       4 cm right adrenal nodule unchanged in size compared to previous CT dated 05/24/2017.      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  Had a CT scan workup for her blood pressure.  Her abdominal lesion on the right side has not increased in size compared to 2017.  That the CT scan from 2017 was compatible with 2008 and that is no significant increase in size of the lesion.    Her plasma metanephrines and catecholamines where sent for and the past one norepinephrine is slightly elevated slightly above normal range.  Her urine metanephrines are not done.  Aldosterone level could not be sent as she was on Aldactone.    She is on multiple blood pressure medications which makes the assessment of plasma metanephrines unreliable.    As the size of the lesion is stable and the plasma levels are not significantly, she is unlikely to have pheochromocytoma.  However we will obtain the urine metanephrines level too  We will obtain a CT scan in about a year and follow-up in a year glasses the size of the adrenal lesion    Had a long discussion with her and she is agreeable with the plan.  Can pursue the heart transplant workup and heart transplant  Plan:         Will follow one year with a CT scan of the abdomen to evaluate the size of the right adrenal lesion.  \will order  Urine metanephrine to confirm                DAVIDA Canas MD, FACS   Associate Professor of Surgery, [facility]   Neuroendocrine Surgery, Hepatic/Pancreatic & General Surgery   [address], [address]   [place] [address]   ph. [phone]; [phone]   fax. [phone]

## 2018-07-02 NOTE — PATIENT INSTRUCTIONS
Return to clinic in 1 year with new CT scan -- appointments made  Lab today, 1st floor outpatient diagnostic center to  specimen container for 24 hour urine

## 2018-07-23 ENCOUNTER — CLINICAL SUPPORT (OUTPATIENT)
Dept: ELECTROPHYSIOLOGY | Facility: CLINIC | Age: 53
End: 2018-07-23
Attending: INTERNAL MEDICINE
Payer: MEDICAID

## 2018-07-23 DIAGNOSIS — I48.0 PAF (PAROXYSMAL ATRIAL FIBRILLATION): ICD-10-CM

## 2018-07-23 DIAGNOSIS — Z95.810 AICD (AUTOMATIC CARDIOVERTER/DEFIBRILLATOR) PRESENT: ICD-10-CM

## 2018-07-23 DIAGNOSIS — I50.9 CHF (CONGESTIVE HEART FAILURE): ICD-10-CM

## 2018-07-23 DIAGNOSIS — I42.8 NONISCHEMIC CARDIOMYOPATHY: ICD-10-CM

## 2018-07-23 PROCEDURE — 93295 DEV INTERROG REMOTE 1/2/MLT: CPT | Mod: NTX,,, | Performed by: INTERNAL MEDICINE

## 2018-07-23 PROCEDURE — 93296 REM INTERROG EVL PM/IDS: CPT | Mod: PBBFAC,NTX | Performed by: INTERNAL MEDICINE

## 2018-07-24 ENCOUNTER — TELEPHONE (OUTPATIENT)
Dept: TRANSPLANT | Facility: CLINIC | Age: 53
End: 2018-07-24

## 2018-07-24 PROBLEM — Z95.810 ICD (IMPLANTABLE CARDIOVERTER-DEFIBRILLATOR) IN PLACE: Status: ACTIVE | Noted: 2018-07-24

## 2018-07-24 NOTE — PROGRESS NOTES
Subjective:    Patient ID:  Hafsa Hawley is a 52 y.o. female who presents for follow-up of Paroxysmal atrial fibrillation      HPI yo female with NICM, CHF, Htn, Sleep Apnea, atrial fibrillation, Fibromyalgia, ICD.  Primary cardiologist is Dr. Cortez.  Longstanding history of non-ischemic cardiomyopathy.  [facility] 11/15/15 EF 30-35% with normal coronary arteries.  Has been on optimal medical therapy.  Admitted to [facility] 9/16 with acute decompensated Heart failure.  Diuresed 12 liters.  Echo 8/24/16 EF 20%.  Repeat echo 10/17/16 EF 30-35% with small pericardial effusion.  ICD implanted 12/2/16 (VDD single pass lead).  Device interrogation reveals stable function of lead, no significant arrhythmias  Denies ICD shocks, syncope, palpitations.  Primary complaint is stress related to family and grandchildren.      Review of Systems   Constitution: Negative. Negative for weakness and malaise/fatigue.   Cardiovascular: Negative for chest pain, dyspnea on exertion, irregular heartbeat, leg swelling, near-syncope, orthopnea, palpitations, paroxysmal nocturnal dyspnea and syncope.   Respiratory: Negative for cough and shortness of breath.    Neurological: Negative for dizziness and light-headedness.   All other systems reviewed and are negative.       Objective:    Physical Exam   Constitutional: She is oriented to person, place, and time. She appears well-developed and well-nourished.   Eyes: Conjunctivae are normal. No scleral icterus.   Neck: No JVD present. No tracheal deviation present.   Cardiovascular: Normal rate and regular rhythm.  PMI is not displaced.    Pulmonary/Chest: Effort normal and breath sounds normal. No respiratory distress.   Abdominal: Soft. There is no hepatosplenomegaly. There is no tenderness.   Musculoskeletal: She exhibits no edema or tenderness.   Neurological: She is alert and oriented to person, place, and time.   Skin: Skin is warm and dry. No rash noted.   Psychiatric: She has a normal  mood and affect. Her behavior is normal.         Assessment:       1. Dilated cardiomyopathy    2. Essential hypertension    3. Paroxysmal atrial fibrillation    4. Chronic kidney disease, stage III (moderate)    5. MELISSA (obstructive sleep apnea)    6. Fibromyalgia affecting multiple sites    7. ICD (implantable cardioverter-defibrillator) in place         Plan:             Continue current settings and medications.  F/u with device monitoring as scheduled, with me in one year.

## 2018-07-25 ENCOUNTER — HOSPITAL ENCOUNTER (OUTPATIENT)
Dept: CARDIOLOGY | Facility: CLINIC | Age: 53
Discharge: HOME | End: 2018-07-25
Payer: MEDICAID

## 2018-07-25 ENCOUNTER — OFFICE VISIT (OUTPATIENT)
Dept: ELECTROPHYSIOLOGY | Facility: CLINIC | Age: 53
End: 2018-07-25
Payer: MEDICAID

## 2018-07-25 ENCOUNTER — TELEPHONE (OUTPATIENT)
Dept: TRANSPLANT | Facility: CLINIC | Age: 53
End: 2018-07-25

## 2018-07-25 VITALS
BODY MASS INDEX: 31.64 KG/M2 | HEIGHT: 66 IN | HEART RATE: 78 BPM | SYSTOLIC BLOOD PRESSURE: 128 MMHG | WEIGHT: 196.88 LBS | DIASTOLIC BLOOD PRESSURE: 68 MMHG

## 2018-07-25 DIAGNOSIS — I42.0 DILATED CARDIOMYOPATHY: Primary | ICD-10-CM

## 2018-07-25 DIAGNOSIS — G47.33 OSA (OBSTRUCTIVE SLEEP APNEA): ICD-10-CM

## 2018-07-25 DIAGNOSIS — M79.7 FIBROMYALGIA AFFECTING MULTIPLE SITES: ICD-10-CM

## 2018-07-25 DIAGNOSIS — Z95.810 ICD (IMPLANTABLE CARDIOVERTER-DEFIBRILLATOR) IN PLACE: ICD-10-CM

## 2018-07-25 DIAGNOSIS — N18.30 CHRONIC KIDNEY DISEASE, STAGE III (MODERATE): ICD-10-CM

## 2018-07-25 DIAGNOSIS — I48.0 PAF (PAROXYSMAL ATRIAL FIBRILLATION): ICD-10-CM

## 2018-07-25 DIAGNOSIS — I50.9 CONGESTIVE HEART FAILURE, UNSPECIFIED HF CHRONICITY, UNSPECIFIED HEART FAILURE TYPE: Primary | ICD-10-CM

## 2018-07-25 DIAGNOSIS — I10 ESSENTIAL HYPERTENSION: ICD-10-CM

## 2018-07-25 DIAGNOSIS — I48.0 PAROXYSMAL ATRIAL FIBRILLATION: ICD-10-CM

## 2018-07-25 PROCEDURE — 93005 ELECTROCARDIOGRAM TRACING: CPT | Mod: PBBFAC,NTX | Performed by: INTERNAL MEDICINE

## 2018-07-25 PROCEDURE — 99214 OFFICE O/P EST MOD 30 MIN: CPT | Mod: S$PBB,NTX,, | Performed by: INTERNAL MEDICINE

## 2018-07-25 PROCEDURE — 99213 OFFICE O/P EST LOW 20 MIN: CPT | Mod: PBBFAC,NTX | Performed by: INTERNAL MEDICINE

## 2018-07-25 PROCEDURE — 93010 ELECTROCARDIOGRAM REPORT: CPT | Mod: S$PBB,NTX,, | Performed by: INTERNAL MEDICINE

## 2018-07-25 PROCEDURE — 99999 PR PBB SHADOW E&M-EST. PATIENT-LVL III: CPT | Mod: PBBFAC,TXP,, | Performed by: INTERNAL MEDICINE

## 2018-07-25 NOTE — TELEPHONE ENCOUNTER
"Pt called to notify Dr Orozco that surgery is not needed at this time per Missael Landry MD, General Surgery for Right adrenal incidentaloma:  Per Note:  "As the size of the lesion is stable and the plasma levels are not significantly, she is unlikely to have pheochromocytoma. However we will obtain the urine metanephrines level too  We will obtain a CT scan in about a year and follow-up in a year glasses the size of the adrenal lesion     Had a long discussion with her and she is agreeable with the plan.  Can pursue the heart transplant workup and heart transplant."    Patient would like to schedule appt for advanced options. Message placed to schedulers to assist with scheduling.   "

## 2018-07-26 DIAGNOSIS — I48.0 PAF (PAROXYSMAL ATRIAL FIBRILLATION): Primary | ICD-10-CM

## 2018-08-13 ENCOUNTER — LAB VISIT (OUTPATIENT)
Dept: LAB | Facility: HOSPITAL | Age: 53
End: 2018-08-13
Attending: INTERNAL MEDICINE
Payer: MEDICAID

## 2018-08-13 ENCOUNTER — OFFICE VISIT (OUTPATIENT)
Dept: TRANSPLANT | Facility: CLINIC | Age: 53
End: 2018-08-13
Payer: MEDICAID

## 2018-08-13 VITALS — SYSTOLIC BLOOD PRESSURE: 144 MMHG | DIASTOLIC BLOOD PRESSURE: 77 MMHG | HEART RATE: 62 BPM

## 2018-08-13 DIAGNOSIS — I50.9 CONGESTIVE HEART FAILURE, UNSPECIFIED HF CHRONICITY, UNSPECIFIED HEART FAILURE TYPE: ICD-10-CM

## 2018-08-13 DIAGNOSIS — I51.7 LEFT VENTRICULAR HYPERTROPHY: ICD-10-CM

## 2018-08-13 DIAGNOSIS — R17 ELEVATED BILIRUBIN: Primary | ICD-10-CM

## 2018-08-13 DIAGNOSIS — D64.9 ANEMIA, UNSPECIFIED TYPE: ICD-10-CM

## 2018-08-13 DIAGNOSIS — Z74.09 IMPAIRED FUNCTIONAL MOBILITY AND ACTIVITY TOLERANCE: ICD-10-CM

## 2018-08-13 DIAGNOSIS — I50.42 CHRONIC COMBINED SYSTOLIC AND DIASTOLIC CONGESTIVE HEART FAILURE: ICD-10-CM

## 2018-08-13 LAB
ALBUMIN SERPL BCP-MCNC: 3.9 G/DL
ALP SERPL-CCNC: 61 U/L
ALT SERPL W/O P-5'-P-CCNC: 10 U/L
ANION GAP SERPL CALC-SCNC: 11 MMOL/L
AST SERPL-CCNC: 19 U/L
BILIRUB SERPL-MCNC: 3.5 MG/DL
BNP SERPL-MCNC: 647 PG/ML
BUN SERPL-MCNC: 31 MG/DL
CALCIUM SERPL-MCNC: 9.8 MG/DL
CHLORIDE SERPL-SCNC: 104 MMOL/L
CO2 SERPL-SCNC: 26 MMOL/L
CREAT SERPL-MCNC: 1.2 MG/DL
EST. GFR  (AFRICAN AMERICAN): >60 ML/MIN/1.73 M^2
EST. GFR  (NON AFRICAN AMERICAN): 52.1 ML/MIN/1.73 M^2
GLUCOSE SERPL-MCNC: 108 MG/DL
POTASSIUM SERPL-SCNC: 4.1 MMOL/L
PROT SERPL-MCNC: 7.5 G/DL
SODIUM SERPL-SCNC: 141 MMOL/L

## 2018-08-13 PROCEDURE — 83880 ASSAY OF NATRIURETIC PEPTIDE: CPT | Mod: NTX

## 2018-08-13 PROCEDURE — 99215 OFFICE O/P EST HI 40 MIN: CPT | Mod: PBBFAC,NTX | Performed by: INTERNAL MEDICINE

## 2018-08-13 PROCEDURE — 99214 OFFICE O/P EST MOD 30 MIN: CPT | Mod: S$PBB,NTX,, | Performed by: INTERNAL MEDICINE

## 2018-08-13 PROCEDURE — 99999 PR PBB SHADOW E&M-EST. PATIENT-LVL V: CPT | Mod: PBBFAC,TXP,, | Performed by: INTERNAL MEDICINE

## 2018-08-13 PROCEDURE — 80053 COMPREHEN METABOLIC PANEL: CPT | Mod: NTX

## 2018-08-13 PROCEDURE — 36415 COLL VENOUS BLD VENIPUNCTURE: CPT | Mod: TXP

## 2018-08-13 RX ORDER — LOSARTAN POTASSIUM 25 MG/1
25 TABLET ORAL DAILY
Qty: 90 TABLET | Refills: 3 | Status: SHIPPED | OUTPATIENT
Start: 2018-08-13 | End: 2018-12-10

## 2018-08-13 NOTE — ASSESSMENT & PLAN NOTE
Looks like she has thallessemia  Will also likely need to f/u with GI for c-scope screening as she is on NOAC

## 2018-08-13 NOTE — PROGRESS NOTES
Subjective: class 3 symptoms - volume overload?   Transplant status: active    HPI:  Ms. Hawley is a 52 y.o. year old Black or  female who has presented to be evaluated as a potential heart transplant recipient (referred by Junior / Dana)   At her initial visit in July 2018, she was told to complete her pheochromocytoma. She was seen by [facility] who feel she is unlikely to have pheochromocytoma  They recommend that we can pursue heart transplant workup    Since her initial visit, she continues to have symptoms of noctural cough/ 3 pillow orthopnea despite lack of edema  LAGUERRE difficult to access as she uses a walker for balance ? / place to sit when she gets tired (See six minute walk below) Reports most of her fluid is in her abdomen.  Reports fatigue / washout out feeling after taking her BP meds over last few years whenever BP < 140 / 80 Unclear which BP meds do this.     Six minute walk 06/21/2018---------Distance: 292.61 meters (960 feet)     O2 Sat % Supplemental Oxygen Heart Rate Blood Pressure Haseeb   Scale   Pre-exercise  (Resting) 98 % Room Air 70 bpm 173/97 mmHg 0.5   During Exercise 95 % Room Air 80 bpm 143/100 mmHg 5-6   Post-exercise  (Recovery) 98 % Room Air  68 bpm 141/93 mmHg        Past Medical History:   Diagnosis Date    Anemia     Anticoagulant long-term use     Arthritis     Atrial fibrillation     Congestive heart failure     COPD (chronic obstructive pulmonary disease)     COPD (chronic obstructive pulmonary disease)     Encounter for blood transfusion     GERD (gastroesophageal reflux disease)     Hypertension     Pheochromocytoma, malignant     Right kidney mass     Sleep apnea     Thyroid disease      Past Surgical History:   Procedure Laterality Date    APPENDECTOMY      CARDIAC DEFIBRILLATOR PLACEMENT Left 12/2016    CARDIAC DEFIBRILLATOR PLACEMENT  2016    EYE SURGERY      due to running tears    FRACTURE SURGERY Left     hand  5th digit    HYSTERECTOMY      KNEE SURGERY Left 2016    hematoma     OB History     No data available        Review of Systems   Constitution: Negative for decreased appetite, weight gain and weight loss.   Cardiovascular: Negative for chest pain, dyspnea on exertion, leg swelling, near-syncope, orthopnea and palpitations.   Respiratory: Negative for cough and shortness of breath.    Musculoskeletal: Negative for myalgias.   Gastrointestinal: Negative for jaundice.       Objective:   Physical Exam   Constitutional: She appears well-developed and well-nourished. No distress.   BP (!) 144/77 (BP Location: Right arm, Patient Position: Sitting, BP Method: Medium (Automatic))   Pulse 62   LMP  (LMP Unknown)      HENT:   Head: Normocephalic and atraumatic. Head is without abrasion and without contusion.   Right Ear: External ear normal.   Left Ear: External ear normal.   Nose: Nose normal. No epistaxis.   Mouth/Throat: Oropharynx is clear and moist. Mucous membranes are not cyanotic.   Eyes: Conjunctivae and EOM are normal. Pupils are equal, round, and reactive to light.   Neck: Normal range of motion. Neck supple. No tracheal deviation present.   Difficult to access JVP due to pulsatile thyroid area   Cardiovascular: Normal rate, regular rhythm, normal heart sounds and normal pulses. Exam reveals no gallop.   No murmur heard.  Pulmonary/Chest: Effort normal and breath sounds normal. No stridor. No respiratory distress. She has no wheezes.   Abdominal: Soft. Normal appearance, normal aorta and bowel sounds are normal. She exhibits no distension. There is no tenderness.   Musculoskeletal: She exhibits no edema or tenderness.   Neurological: She is alert. She has normal strength and normal reflexes. She exhibits normal muscle tone.   Skin: Skin is warm. No rash noted. No erythema.   Psychiatric: She has a normal mood and affect. Her speech is normal and behavior is normal. Judgment and thought content normal. Cognition  and memory are normal.       Labs:    Chemistry        Component Value Date/Time     08/13/2018 1212    K 4.1 08/13/2018 1212     08/13/2018 1212    CO2 26 08/13/2018 1212    BUN 31 (H) 08/13/2018 1212    CREATININE 1.2 08/13/2018 1212     08/13/2018 1212        Component Value Date/Time    CALCIUM 9.8 08/13/2018 1212    ALKPHOS 61 08/13/2018 1212    AST 19 08/13/2018 1212    ALT 10 08/13/2018 1212    BILITOT 3.5 (H) 08/13/2018 1212    ESTGFRAFRICA >60.0 08/13/2018 1212    EGFRNONAA 52.1 (A) 08/13/2018 1212          Magnesium   Date Value Ref Range Status   05/10/2018 2.1 1.6 - 2.6 mg/dL Final     Lab Results   Component Value Date    WBC 6.09 08/01/2018    HGB 9.7 (L) 08/01/2018    HCT 30.0 (L) 08/01/2018     08/01/2018     Lab Results   Component Value Date    INR 1.2 08/01/2018    INR 1.1 06/11/2018    INR 1.0 02/05/2018     BNP   Date Value Ref Range Status   08/13/2018 647 (H) 0 - 99 pg/mL Final     Comment:     Values of less than 100 pg/ml are consistent with non-CHF populations.   06/21/2018 1,066 (H) 0 - 99 pg/mL Final     Comment:     Values of less than 100 pg/ml are consistent with non-CHF populations.   03/16/2018 2,839 (H) 0 - 99 pg/mL Final     Comment:     Values of less than 100 pg/ml are consistent with non-CHF populations.     LD   Date Value Ref Range Status   06/11/2018 308 (H) 110 - 260 U/L Final     Comment:     Results are increased in hemolyzed samples.   08/23/2016 280 (H) 110 - 260 U/L Final     Comment:     Results are increased in hemolyzed samples.     No results found for this or any previous visit.  No results found for this or any previous visit.    Assessment:      1. Elevated bilirubin    2. Left ventricular hypertrophy    3. Impaired functional mobility and activity tolerance    4. Chronic combined systolic and diastolic congestive heart failure    5. Anemia, unspecified type        Plan:   Impaired functional mobility and activity tolerance  Not clear  why she needs walker   Would try to figure out if she has noncardiac issues which limit her     Left ventricular hypertrophy  suspect HTN cause of her SOB / thick heart on echo  Consider workup for amyloid     Elevated bilirubin  Elevated for greater than one year  Will see what hepatology thinks   Suspect she will need a liver biopsy     Chronic combined systolic and diastolic congestive heart failure  Will stop isordil / hydralazine to see if this is the cause of her washout symptoms  Will resume losartan with intent to move onto entresto   If she does not feel better in terms of SOB / PND, can admit for IV duiresis     Anemia  Looks like she has thallessemia  Will also likely need to f/u with GI for c-scope screening as she is on NOAC       Patient is now NYHA III  Recommend 2 gram sodium restriction and 1500cc fluid restriction.  Encourage physical activity with graded exercise program.  Requested patient to weigh themselves daily, and to notify us if their weight increases by more than 3 lbs in 1 day or 5 lbs in 1 week.     Listed for transplant: No     Transplant candidacy:  Patient is a 52 y.o. year old female with heart failure is being seen for possible LVAD and OHT. In my opinion she is a marginal candidate (See items listed above) she is scheduled for medical optimization prior to risk stratification. The patient will follow up with pre-transplant.    Patient met with [facility]CS and/or pre-transplant coordinator at the end of this visit.

## 2018-08-13 NOTE — ASSESSMENT & PLAN NOTE
Will stop isordil / hydralazine to see if this is the cause of her washout symptoms  Will resume losartan with intent to move onto entresto   If she does not feel better in terms of SOB / PND, can admit for IV bradleyis

## 2018-08-13 NOTE — ASSESSMENT & PLAN NOTE
Elevated for greater than one year  Will see what hepatology thinks   Suspect she will need a liver biopsy

## 2018-08-13 NOTE — ASSESSMENT & PLAN NOTE
Not clear why she needs walker   Would try to figure out if she has noncardiac issues which limit her

## 2018-08-13 NOTE — LETTER
August 13, 2018        Andrea Pacheco  [address]  [address]  [place] [address]  Phone: [phone]  Fax: [phone]             [facility] [address] [place] [address]  Phone: [phone]   Patient: Hafsa Hawley   MR Number: [id number]   YOB: 1965   Date of Visit: 8/13/2018       Dear Dr. Andrea Pacheco    Thank you for referring Hafsa Hawley to me for evaluation. Attached you will find relevant portions of my assessment and plan of care.    If you have questions, please do not hesitate to call me. I look forward to following Hafsa Hawley along with you.    Sincerely,    Beau Rodriguez MD    Enclosure    If you would like to receive this communication electronically, please contact [email or web address] or [phone] to request [employer] access.    [employer] is a tool which provides read-only access to select patient information with whom you have a relationship. Its easy to use and provides real time access to review your patients record including encounter summaries, notes, results, and demographic information.    If you feel you have received this communication in error or would no longer like to receive these types of communications, please e-mail [email or web address]

## 2018-08-14 ENCOUNTER — TELEPHONE (OUTPATIENT)
Dept: TRANSPLANT | Facility: CLINIC | Age: 53
End: 2018-08-14

## 2018-08-15 NOTE — TELEPHONE ENCOUNTER
----- Message from Ina Woody sent at 8/15/2018 12:25 PM CDT -----  Contact: pt [phone]  Pt would like a call in ref to a Rx that was not called in and information on her hospital admission tomorrow. Please call [phone].    Thanks

## 2018-08-15 NOTE — TELEPHONE ENCOUNTER
"Contacted pt, stated she thought she was supposed to start Coumadin per Dr Rodriguez conversation. States she is having episodes of "heart racing and throat feeling tight."  Took VS BP = 111/70's - -134.    Dr Rodriguez notified of the above, stated to continue medications as prescribed. Will fu with pt next week regarding c/o heart "racing and throat feeling tight."  "

## 2018-08-17 ENCOUNTER — TELEPHONE (OUTPATIENT)
Dept: TRANSPLANT | Facility: CLINIC | Age: 53
End: 2018-08-17

## 2018-08-17 NOTE — TELEPHONE ENCOUNTER
Attempted to contact pt regarding visiting 3rd floor clinic following her hepatology clinic appt on 8/21/18. Dr. Rodriguez to see pt. Tx Coord name and contact information left with a request to return call. Will follow up on Monday.

## 2018-08-20 ENCOUNTER — TELEPHONE (OUTPATIENT)
Dept: HEPATOLOGY | Facility: CLINIC | Age: 53
End: 2018-08-20

## 2018-08-20 ENCOUNTER — TELEPHONE (OUTPATIENT)
Dept: TRANSPLANT | Facility: CLINIC | Age: 53
End: 2018-08-20

## 2018-08-20 PROBLEM — I47.10 SVT (SUPRAVENTRICULAR TACHYCARDIA): Status: ACTIVE | Noted: 2018-08-20

## 2018-08-20 NOTE — TELEPHONE ENCOUNTER
Patient called to advise that she is in the hospital at [facility] following episode of dyspnea and palpitations and chest pain; she called following transport by EMT today.  She asked about being admitted here.  Advised to have her attending physician contact Dr. Rodriguez through the transfer center if transfer to [facility] is desired.  Understanding verbalized.

## 2018-08-20 NOTE — TELEPHONE ENCOUNTER
MA called patient spoke to kiko give him message to please have patient call us back we are going to cancel her appt tomorrow 8/21 due to NP will be in maternity leave. He will have patient to call us back.

## 2018-08-21 ENCOUNTER — TELEPHONE (OUTPATIENT)
Dept: TRANSPLANT | Facility: CLINIC | Age: 53
End: 2018-08-21

## 2018-08-21 NOTE — TELEPHONE ENCOUNTER
----- Message from Natalia Marc MA sent at 8/21/2018  8:28 AM CDT -----  Contact: Dr. Jenifer Burgess from [facility]  would like to talk to Colleen about this patient she can be reach on her cell phone [phone] or at the Nursing Station [phone]. Thank you.

## 2018-08-21 NOTE — TELEPHONE ENCOUNTER
Returned call to Dr. Burgess who reported that patient was admitted yesterday to [facility] with SVT (per ambulance), was cardioverted in the ED and observed over night.  She reports that patient is feeling much better today and will be discharged.  MD reports no indication for further admission.  Pt has f/u at end of Sept; will try to reschedule sooner.  Dr. Rodriguez notified electronically.

## 2018-08-22 ENCOUNTER — TELEPHONE (OUTPATIENT)
Dept: TRANSPLANT | Facility: CLINIC | Age: 53
End: 2018-08-22

## 2018-08-28 ENCOUNTER — TELEPHONE (OUTPATIENT)
Dept: FAMILY MEDICINE | Facility: HOSPITAL | Age: 53
End: 2018-08-28

## 2018-08-28 ENCOUNTER — TELEPHONE (OUTPATIENT)
Dept: TRANSPLANT | Facility: CLINIC | Age: 53
End: 2018-08-28

## 2018-08-28 NOTE — TELEPHONE ENCOUNTER
----- Message from Micaela Salvador sent at 8/28/2018 12:07 PM CDT -----  Mara from [facility] is calling to let Dr know that spironolactone (ALDACTONE) 25 MG tablet is interacting with another medicine that was prescribed by a different doctor. [phone]

## 2018-08-28 NOTE — TELEPHONE ENCOUNTER
"Received call from pt reporting extreme SOB. She states she saw her local MD today who "told me he can't do anything to help me, that I need to see Dr Terry today".   Advised pt that dr terry is not in clinic today.     Pt is audibly SOB with conversation, needing to stop to take a few breaths. She reports 5lb wt gain in the past 3 days, inability to lay flat and LE edema.   Advised pt to go to ER.  She stated although she would like to go to [facility] ER, she has no one who can bring her this far. She will go to [facility] as it is less than 1 mile away.   Advised pt that once she is stable, she can request the hospital call Dr Terry to see if she can be transferred to [facility].   Pt voiced understanding.     Primary coordinaor notified. Dr Terry notified.   "

## 2018-08-30 ENCOUNTER — HOSPITAL ENCOUNTER (INPATIENT)
Facility: HOSPITAL | Age: 53
LOS: 4 days | Discharge: HOME | DRG: 291 | End: 2018-09-03
Attending: EMERGENCY MEDICINE | Admitting: HOSPITALIST
Payer: MEDICAID

## 2018-08-30 DIAGNOSIS — I21.4 NSTEMI (NON-ST ELEVATED MYOCARDIAL INFARCTION): ICD-10-CM

## 2018-08-30 DIAGNOSIS — I50.43 ACUTE ON CHRONIC COMBINED SYSTOLIC AND DIASTOLIC CONGESTIVE HEART FAILURE: ICD-10-CM

## 2018-08-30 DIAGNOSIS — I50.43 ACUTE ON CHRONIC COMBINED SYSTOLIC (CONGESTIVE) AND DIASTOLIC (CONGESTIVE) HEART FAILURE: Primary | ICD-10-CM

## 2018-08-30 DIAGNOSIS — R06.00 DYSPNEA: ICD-10-CM

## 2018-08-30 PROBLEM — R17 TOTAL BILIRUBIN, ELEVATED: Status: ACTIVE | Noted: 2018-08-30

## 2018-08-30 PROBLEM — I50.42 CHRONIC COMBINED SYSTOLIC AND DIASTOLIC CONGESTIVE HEART FAILURE: Chronic | Status: ACTIVE | Noted: 2018-03-16

## 2018-08-30 PROBLEM — J44.9 COPD (CHRONIC OBSTRUCTIVE PULMONARY DISEASE): Chronic | Status: ACTIVE | Noted: 2017-12-19

## 2018-08-30 PROBLEM — I47.10 SVT (SUPRAVENTRICULAR TACHYCARDIA): Chronic | Status: ACTIVE | Noted: 2018-08-20

## 2018-08-30 PROBLEM — Z95.810 ICD (IMPLANTABLE CARDIOVERTER-DEFIBRILLATOR) IN PLACE: Chronic | Status: ACTIVE | Noted: 2018-07-24

## 2018-08-30 PROBLEM — N18.30 CHRONIC KIDNEY DISEASE, STAGE III (MODERATE): Chronic | Status: ACTIVE | Noted: 2018-05-08

## 2018-08-30 LAB
ACANTHOCYTES BLD QL SMEAR: PRESENT
ALBUMIN SERPL BCP-MCNC: 4 G/DL
ALP SERPL-CCNC: 66 U/L
ALT SERPL W/O P-5'-P-CCNC: 14 U/L
ANION GAP SERPL CALC-SCNC: 12 MMOL/L
ANISOCYTOSIS BLD QL SMEAR: ABNORMAL
AST SERPL-CCNC: 24 U/L
BASOPHILS # BLD AUTO: 0.03 K/UL
BASOPHILS NFR BLD: 0.5 %
BILIRUB SERPL-MCNC: 5.6 MG/DL
BNP SERPL-MCNC: 2573 PG/ML
BUN SERPL-MCNC: 29 MG/DL
CALCIUM SERPL-MCNC: 9.6 MG/DL
CHLORIDE SERPL-SCNC: 107 MMOL/L
CO2 SERPL-SCNC: 24 MMOL/L
CREAT SERPL-MCNC: 1.2 MG/DL
DACRYOCYTES BLD QL SMEAR: ABNORMAL
DIFFERENTIAL METHOD: ABNORMAL
EOSINOPHIL # BLD AUTO: 0.1 K/UL
EOSINOPHIL NFR BLD: 1.3 %
ERYTHROCYTE [DISTWIDTH] IN BLOOD BY AUTOMATED COUNT: 25.3 %
EST. GFR  (AFRICAN AMERICAN): >60 ML/MIN/1.73 M^2
EST. GFR  (NON AFRICAN AMERICAN): 52 ML/MIN/1.73 M^2
GIANT PLATELETS BLD QL SMEAR: PRESENT
GLUCOSE SERPL-MCNC: 103 MG/DL
HCT VFR BLD AUTO: 33.8 %
HGB BLD-MCNC: 10 G/DL
LYMPHOCYTES # BLD AUTO: 1.6 K/UL
LYMPHOCYTES NFR BLD: 27.2 %
MCH RBC QN AUTO: 16.8 PG
MCHC RBC AUTO-ENTMCNC: 29.6 G/DL
MCV RBC AUTO: 57 FL
MONOCYTES # BLD AUTO: 0.5 K/UL
MONOCYTES NFR BLD: 7.7 %
NEUTROPHILS # BLD AUTO: 3.8 K/UL
NEUTROPHILS NFR BLD: 63.3 %
NRBC BLD-RTO: 12 /100 WBC
OVALOCYTES BLD QL SMEAR: ABNORMAL
PLATELET # BLD AUTO: 228 K/UL
PMV BLD AUTO: ABNORMAL FL
POIKILOCYTOSIS BLD QL SMEAR: ABNORMAL
POLYCHROMASIA BLD QL SMEAR: ABNORMAL
POTASSIUM SERPL-SCNC: 4.3 MMOL/L
PROT SERPL-MCNC: 6.9 G/DL
RBC # BLD AUTO: 5.95 M/UL
SCHISTOCYTES BLD QL SMEAR: ABNORMAL
SCHISTOCYTES BLD QL SMEAR: PRESENT
SODIUM SERPL-SCNC: 143 MMOL/L
TARGETS BLD QL SMEAR: ABNORMAL
TROPONIN I SERPL DL<=0.01 NG/ML-MCNC: 0.64 NG/ML
TROPONIN I SERPL DL<=0.01 NG/ML-MCNC: 0.66 NG/ML
WBC # BLD AUTO: 5.99 K/UL

## 2018-08-30 PROCEDURE — G0378 HOSPITAL OBSERVATION PER HR: HCPCS | Mod: NTX

## 2018-08-30 PROCEDURE — 63600175 PHARM REV CODE 636 W HCPCS: Mod: NTX | Performed by: PHYSICIAN ASSISTANT

## 2018-08-30 PROCEDURE — 96375 TX/PRO/DX INJ NEW DRUG ADDON: CPT | Mod: NTX

## 2018-08-30 PROCEDURE — 84484 ASSAY OF TROPONIN QUANT: CPT | Mod: 91,NTX

## 2018-08-30 PROCEDURE — 83880 ASSAY OF NATRIURETIC PEPTIDE: CPT | Mod: NTX

## 2018-08-30 PROCEDURE — 96372 THER/PROPH/DIAG INJ SC/IM: CPT | Mod: 25,59,NTX

## 2018-08-30 PROCEDURE — 99285 EMERGENCY DEPT VISIT HI MDM: CPT | Mod: 25,NTX

## 2018-08-30 PROCEDURE — 25500020 PHARM REV CODE 255: Mod: NTX | Performed by: EMERGENCY MEDICINE

## 2018-08-30 PROCEDURE — 25000003 PHARM REV CODE 250: Mod: NTX | Performed by: PHYSICIAN ASSISTANT

## 2018-08-30 PROCEDURE — 84484 ASSAY OF TROPONIN QUANT: CPT | Mod: NTX

## 2018-08-30 PROCEDURE — 63600175 PHARM REV CODE 636 W HCPCS: Mod: NTX | Performed by: EMERGENCY MEDICINE

## 2018-08-30 PROCEDURE — 11000001 HC ACUTE MED/SURG PRIVATE ROOM: Mod: NTX

## 2018-08-30 PROCEDURE — 93010 ELECTROCARDIOGRAM REPORT: CPT | Mod: NTX,,, | Performed by: INTERNAL MEDICINE

## 2018-08-30 PROCEDURE — 63600175 PHARM REV CODE 636 W HCPCS: Mod: NTX

## 2018-08-30 PROCEDURE — 85025 COMPLETE CBC W/AUTO DIFF WBC: CPT | Mod: NTX

## 2018-08-30 PROCEDURE — 36415 COLL VENOUS BLD VENIPUNCTURE: CPT | Mod: NTX

## 2018-08-30 PROCEDURE — 25000003 PHARM REV CODE 250: Mod: NTX | Performed by: EMERGENCY MEDICINE

## 2018-08-30 PROCEDURE — 93005 ELECTROCARDIOGRAM TRACING: CPT | Mod: NTX

## 2018-08-30 PROCEDURE — 96374 THER/PROPH/DIAG INJ IV PUSH: CPT | Mod: NTX

## 2018-08-30 PROCEDURE — 80053 COMPREHEN METABOLIC PANEL: CPT | Mod: NTX

## 2018-08-30 RX ORDER — IPRATROPIUM BROMIDE AND ALBUTEROL SULFATE 2.5; .5 MG/3ML; MG/3ML
1 SOLUTION RESPIRATORY (INHALATION) EVERY 6 HOURS PRN
Status: DISCONTINUED | OUTPATIENT
Start: 2018-08-30 | End: 2018-09-03 | Stop reason: HOSPADM

## 2018-08-30 RX ORDER — LOSARTAN POTASSIUM 25 MG/1
25 TABLET ORAL DAILY
Status: DISCONTINUED | OUTPATIENT
Start: 2018-08-31 | End: 2018-09-03 | Stop reason: HOSPADM

## 2018-08-30 RX ORDER — ATORVASTATIN CALCIUM 40 MG/1
40 TABLET, FILM COATED ORAL DAILY
Status: DISCONTINUED | OUTPATIENT
Start: 2018-08-31 | End: 2018-09-03 | Stop reason: HOSPADM

## 2018-08-30 RX ORDER — ENOXAPARIN SODIUM 100 MG/ML
1 INJECTION SUBCUTANEOUS
Status: COMPLETED | OUTPATIENT
Start: 2018-08-30 | End: 2018-08-30

## 2018-08-30 RX ORDER — FLUTICASONE FUROATE AND VILANTEROL 100; 25 UG/1; UG/1
1 POWDER RESPIRATORY (INHALATION) DAILY
Status: DISCONTINUED | OUTPATIENT
Start: 2018-08-31 | End: 2018-09-03 | Stop reason: HOSPADM

## 2018-08-30 RX ORDER — ESCITALOPRAM OXALATE 5 MG/1
5 TABLET ORAL DAILY
Status: DISCONTINUED | OUTPATIENT
Start: 2018-08-31 | End: 2018-09-03 | Stop reason: HOSPADM

## 2018-08-30 RX ORDER — ASPIRIN 325 MG
325 TABLET ORAL
Status: COMPLETED | OUTPATIENT
Start: 2018-08-30 | End: 2018-08-30

## 2018-08-30 RX ORDER — ONDANSETRON 2 MG/ML
4 INJECTION INTRAMUSCULAR; INTRAVENOUS
Status: COMPLETED | OUTPATIENT
Start: 2018-08-30 | End: 2018-08-30

## 2018-08-30 RX ORDER — NITROGLYCERIN 0.4 MG/1
0.4 TABLET SUBLINGUAL EVERY 5 MIN PRN
Status: DISCONTINUED | OUTPATIENT
Start: 2018-08-30 | End: 2018-09-03 | Stop reason: HOSPADM

## 2018-08-30 RX ORDER — CARVEDILOL 25 MG/1
25 TABLET ORAL 2 TIMES DAILY
Status: DISCONTINUED | OUTPATIENT
Start: 2018-08-30 | End: 2018-09-03 | Stop reason: HOSPADM

## 2018-08-30 RX ORDER — PANTOPRAZOLE SODIUM 40 MG/1
40 TABLET, DELAYED RELEASE ORAL DAILY
Status: DISCONTINUED | OUTPATIENT
Start: 2018-08-31 | End: 2018-09-03 | Stop reason: HOSPADM

## 2018-08-30 RX ORDER — CLOPIDOGREL BISULFATE 75 MG/1
300 TABLET ORAL
Status: COMPLETED | OUTPATIENT
Start: 2018-08-30 | End: 2018-08-30

## 2018-08-30 RX ORDER — ALBUTEROL SULFATE 90 UG/1
2 AEROSOL, METERED RESPIRATORY (INHALATION) 2 TIMES DAILY PRN
Status: DISCONTINUED | OUTPATIENT
Start: 2018-08-30 | End: 2018-09-03 | Stop reason: HOSPADM

## 2018-08-30 RX ORDER — FUROSEMIDE 10 MG/ML
80 INJECTION INTRAMUSCULAR; INTRAVENOUS 3 TIMES DAILY
Status: DISCONTINUED | OUTPATIENT
Start: 2018-08-30 | End: 2018-08-31

## 2018-08-30 RX ORDER — ONDANSETRON 2 MG/ML
INJECTION INTRAMUSCULAR; INTRAVENOUS
Status: COMPLETED
Start: 2018-08-30 | End: 2018-08-30

## 2018-08-30 RX ORDER — MORPHINE SULFATE 2 MG/ML
2 INJECTION, SOLUTION INTRAMUSCULAR; INTRAVENOUS
Status: COMPLETED | OUTPATIENT
Start: 2018-08-30 | End: 2018-08-30

## 2018-08-30 RX ORDER — POTASSIUM CHLORIDE 750 MG/1
10 TABLET, EXTENDED RELEASE ORAL DAILY
Status: DISCONTINUED | OUTPATIENT
Start: 2018-08-31 | End: 2018-09-03 | Stop reason: HOSPADM

## 2018-08-30 RX ORDER — SPIRONOLACTONE 25 MG/1
25 TABLET ORAL DAILY
Status: DISCONTINUED | OUTPATIENT
Start: 2018-08-31 | End: 2018-09-03 | Stop reason: HOSPADM

## 2018-08-30 RX ADMIN — MORPHINE SULFATE 2 MG: 2 INJECTION, SOLUTION INTRAMUSCULAR; INTRAVENOUS at 04:08

## 2018-08-30 RX ADMIN — ONDANSETRON 4 MG: 2 INJECTION INTRAMUSCULAR; INTRAVENOUS at 04:08

## 2018-08-30 RX ADMIN — IOHEXOL 100 ML: 350 INJECTION, SOLUTION INTRAVENOUS at 05:08

## 2018-08-30 RX ADMIN — ENOXAPARIN SODIUM 90 MG: 100 INJECTION, SOLUTION INTRAVENOUS; SUBCUTANEOUS at 06:08

## 2018-08-30 RX ADMIN — CLOPIDOGREL BISULFATE 300 MG: 75 TABLET ORAL at 06:08

## 2018-08-30 RX ADMIN — FUROSEMIDE 80 MG: 10 INJECTION, SOLUTION INTRAMUSCULAR; INTRAVENOUS at 08:08

## 2018-08-30 RX ADMIN — CARVEDILOL 25 MG: 25 TABLET, FILM COATED ORAL at 08:08

## 2018-08-30 RX ADMIN — ASPIRIN 325 MG ORAL TABLET 325 MG: 325 PILL ORAL at 05:08

## 2018-08-30 NOTE — ED NOTES
Pt c/o SOB and generalized chest pain for the past few days, worse in the past 2 days. SOB worsens on exertion. On initial arrival to ER room 3 pt was tachypneic, dyspneic, with O2 sat. 85% on room air. Placed on nasal cannula with 2L O2, and pt quickly reports improvement in SOB within the first minute of receiving oxygen. Breath sounds clear but diminished throughout chest. Normal sinus rhythm noted on monitor. Pt is hypertensive, but states she did take all of her prescribed bp meds this morning. Pt also c/o small area of redness to right eye and right eye pain for the past week. Sclera mildly jaundiced, but pt states this is her baseline. Pt denies any recent swelling to extremities.

## 2018-08-30 NOTE — HPI
Ms. Hawley is a 53 yo black woman with chronic heart failure with EF of 25%, HTN, COPD, anemia, pheochromocytoma, paroxysmal Afib, paroxysmal SVT, s/p ICD placement who presents today with NSTEMI. She reports worsening SOB, generalized chest tightness, and intermittent left sided chest pain for the last several days. She also reports intermittent episodes of diaphoresis, palpitations, and lightheadedness that make her feel like she will pass out. She reports PND but denies edema or syncope. She was recently hospitalized at [facility] for SVT. In the ED, EKG showed NSR. Tbili was 5.6, Hgb 10.0. Troponin was 0.658, up from 0.09 baseline 10 days ago. BNP was 2573, consistent with recent hospitalizations for CHF exacerbations. CTA chest showed no PE. CXR was clear. She was admitted to hospital medicine for troponin trending and possible cardiology consult.

## 2018-08-30 NOTE — ED PROVIDER NOTES
"NAME:  Hafsa Hawley  CSN:     [id number]  MRN:    [id number]  ADMIT DATE: 8/30/2018        eMERGENCY dEPARTMENT eNCOUnter    CHIEF COMPLAINT    Chief Complaint   Patient presents with    Shortness of Breath     with tightness in chest and mutliple other complaint x2 days       HPI      Hafsa Hawley is a 52 y.o. female who presents to the ED for evaluation of shortness of breath and chest tightness.  Patient states the symptoms started 2 days ago.  She denies any fevers or cough.  The patient has a history of CHF.          ALLERGIES    Review of patient's allergies indicates:   Allergen Reactions    Penicillins Hives    Percocet [oxycodone-acetaminophen] Other (See Comments)     Nausea, Dizziness, Anxiety.  "I don't like how it makes me feel."   Given Hydromorphone 0.5mg IVP  Without problems.    Diovan hct [valsartan-hydrochlorothiazide] Other (See Comments)     Causes coughing    Tramadol Nausea And Vomiting       PAST MEDICAL HISTORY  Past Medical History:   Diagnosis Date    Anemia     Anticoagulant long-term use     Arthritis     Atrial fibrillation     Congestive heart failure     COPD (chronic obstructive pulmonary disease)     COPD (chronic obstructive pulmonary disease)     Encounter for blood transfusion     GERD (gastroesophageal reflux disease)     Hypertension     Pheochromocytoma, malignant     Right kidney mass     Sleep apnea     Thyroid disease        SURGICAL HISTORY    Past Surgical History:   Procedure Laterality Date    APPENDECTOMY      CARDIAC DEFIBRILLATOR PLACEMENT Left 12/2016    CARDIAC DEFIBRILLATOR PLACEMENT  2016    EYE SURGERY      due to running tears    FRACTURE SURGERY Left     hand 5th digit    HYSTERECTOMY      KNEE SURGERY Left 2016    hematoma       SOCIAL HISTORY    Social History     Socioeconomic History    Marital status:      Spouse name: None    Number of children: None    Years of education: None    Highest education level: None " "  Social Needs    Financial resource strain: None    Food insecurity - worry: None    Food insecurity - inability: None    Transportation needs - medical: None    Transportation needs - non-medical: None   Occupational History    None   Tobacco Use    Smoking status: Never Smoker    Smokeless tobacco: Never Used   Substance and Sexual Activity    Alcohol use: No     Comment: up to 1 yr ago drank 2-3 drinks on occasion but sporadic    Drug use: No    Sexual activity: Yes     Partners: Male   Other Topics Concern    None   Social History Narrative    On disability since        FAMILY HISTORY    Family History   Problem Relation Age of Onset    Cancer Mother         pancreatic CA early 50's    Heart disease Father          MI in late 50's    Hypertension Father     Heart disease Sister     Heart disease Brother     Heart disease Sister     Heart disease Brother     Hypertension Brother     Diabetes Brother        REVIEW OF SYSTEMS   ROS  All Systems otherwise negative except as noted in the History of Present Illness.        PHYSICAL EXAM    Reviewed Triage Note  VITAL SIGNS:   ED Triage Vitals [18 1440]   Enc Vitals Group      BP (!) 187/108      Pulse 85      Resp (!) 24      Temp 98.3 °F (36.8 °C)      Temp src Oral      SpO2 (!) 94 %      Weight 196 lb      Height 5' 6"      Head Circumference       Peak Flow       Pain Score       Pain Loc       Pain Edu?       Excl. in GC?        Patient Vitals for the past 24 hrs:   BP Temp Temp src Pulse Resp SpO2 Height Weight   18 1830 (!) 154/98 -- -- 82 20 99 % -- --   18 1800 (!) 167/101 -- -- 91 (!) 21 97 % -- --   18 1700 (!) 149/102 -- -- 86 (!) 25 96 % -- --   18 1600 (!) 175/102 -- -- 91 (!) 25 99 % -- --   18 1530 (!) 182/102 -- -- 80 20 98 % -- --   18 1520 -- -- -- 80 (!) 30 99 % -- --   18 1510 (!) 180/95 -- -- 90 (!) 28 (!) 87 % -- --   18 1440 (!) 187/108 98.3 °F (36.8 °C) Oral 85 " "(!) 24 (!) 94 % 5' 6" (1.676 m) 88.9 kg (196 lb)           Physical Exam    Constitutional:  Uncomfortable but nontoxic appearance  HENT:  Normocephalic, atraumatic.  Eyes:  EOMI. Conjunctiva normal without discharge.   Neck: Normal range of motion.No stridor. No meningismus.   Respiratory:  Tachypneic.  Normal breath sounds bilaterally.  No respiratory distress, retractions, or conversational dyspnea. No wheezing. No rhonchi. No rales.   Cardiovascular:  Normal heart rate. Normal rhythm. No pitting lower extremity edema.   GI:  Abdomen soft, non-distended, non-tender. Normal bowel sounds. No guarding, rigidity or rebound.    : No CVA tenderness.   Musculoskeletal:  No gross deformity or limited range of motion of all major joints. No palpable bony deformity. No tenderness to palpation.  Integument:  Warm and dry. No rash.  Neurologic:  Normal motor function. Normal sensory function. No focal deficits noted. Alert and Interactive.  Psychiatric:  Affect normal. Mood normal.         LABS  Pertinent labs reviewed. (See chart for details)   Labs Reviewed   CBC W/ AUTO DIFFERENTIAL - Abnormal; Notable for the following components:       Result Value    RBC 5.95 (*)     Hemoglobin 10.0 (*)     Hematocrit 33.8 (*)     MCV 57 (*)     MCH 16.8 (*)     MCHC 29.6 (*)     RDW 25.3 (*)     nRBC 12 (*)     All other components within normal limits   COMPREHENSIVE METABOLIC PANEL - Abnormal; Notable for the following components:    BUN, Bld 29 (*)     Total Bilirubin 5.6 (*)     eGFR if non  52 (*)     All other components within normal limits   TROPONIN I - Abnormal; Notable for the following components:    Troponin I 0.658 (*)     All other components within normal limits   B-TYPE NATRIURETIC PEPTIDE - Abnormal; Notable for the following components:    BNP 2,573 (*)     All other components within normal limits         RADIOLOGY    Imaging Results          CTA Chest Non-Coronary - PE Study (Final result)  " Result time 08/30/18 17:43:56    Final result by Emily Conner MD (08/30/18 17:43:56)                 Impression:      1. No evidence of PE.  2. Cardiomegaly with small pericardial effusion.  3. Additional findings as detailed above.      Electronically signed by: Emily Conner MD  Date:    08/30/2018  Time:    17:43             Narrative:    EXAMINATION:  CTA CHEST NON CORONARY    CLINICAL HISTORY:  Chest pain, acute, PE suspected, high pretest prob;    TECHNIQUE:  Low dose axial images, sagittal and coronal reformations were obtained from the thoracic inlet to the lung bases following the IV administration of 100 mL of Omnipaque 350.  Contrast timing was optimized to evaluate the pulmonary arteries.  MIP images were performed.    COMPARISON:  Previous chest CTA from August 2016 is unavailable for review on PACS    FINDINGS:  Structures at the base of the neck are unremarkable.  Left-sided pacer device is seen.  Aorta is non-aneurysmal.  Heart is significantly enlarged with small volume pericardial effusion.  No intraluminal filling defects within the pulmonary arteries to suggest pulmonary thromboembolism.   There is no evidence of mediastinal, axillary, or hilar lymph node enlargement.  The esophagus maintains a normal course and caliber.    The trachea and bronchi are patent.  The lungs are symmetrically expanded.  Evaluation of lung parenchyma slightly limited by respiratory motion with minimal ground-glass attenuation seen.  No evidence of focal consolidation, mass, or effusion.    Partially visualized 3 cm indeterminate right adrenal nodule is seen.  Reflux of contrast is seen into the hepatic veins which may be indicative of elevated right heart pressures.  Osseous structures demonstrate mild degenerative change.  Extrathoracic soft tissues are unremarkable.                               X-Ray Chest PA And Lateral (Final result)  Result time 08/30/18 16:05:27    Final result by Roldan Cohen MD  (08/30/18 16:05:27)                 Impression:      Enlarged cardiac silhouette, similar to the prior exam.      Electronically signed by: Roldan Cohen MD  Date:    08/30/2018  Time:    16:05             Narrative:    EXAMINATION:  XR CHEST PA AND LATERAL    CLINICAL HISTORY:  Dyspnea, unspecified    TECHNIQUE:  PA and lateral views of the chest were performed.    COMPARISON:  08/20/2018    FINDINGS:  Enlarged cardiac silhouette.  Left cardiac lead projecting in the right ventricle unchanged.  The lungs are clear.  No pneumothorax.  No pleural effusions.                                PROCEDURES    Procedures      EKG     Interpreted by ERP:     EKG Readings: (Independently Interpreted)   Rhythm: Normal Sinus Rhythm. Heart Rate: 90. Ectopy: No Ectopy. Conduction: Normal. ST Segments: Normal ST Segments. T Waves: Normal. Clinical Impression: Normal Sinus Rhythm       ED COURSE & MEDICAL DECISION MAKING    Pertinent & Imaging studies reviewed. (See chart for details and specific orders.)        Medications   morphine injection 2 mg (2 mg Intravenous Given 8/30/18 1649)   ondansetron injection 4 mg (4 mg Intravenous Given 8/30/18 1649)   aspirin tablet 325 mg (325 mg Oral Given 8/30/18 1708)   omnipaque 350 iohexol 100 mL (100 mLs Intravenous Given 8/30/18 1728)   clopidogrel tablet 300 mg (300 mg Oral Given 8/30/18 1812)   enoxaparin injection 90 mg (90 mg Subcutaneous Given 8/30/18 1831)         the patient has an elevated troponin.  I obtained a CTA to rule out pulmonary embolus because she does not have any pulmonary edema however she is tachypneic.  The patient's CT was negative. I will admit her for NSTEMI.  I discussed her case with Dr. black who requested Plavix and Lovenox.         DISPOSITION  Patient admitted in stable condition at No discharge date for patient encounter.        FINAL IMPRESSION    1. NSTEMI (non-ST elevated myocardial infarction)    2. Dyspnea              Critical care time spent with  this patient (not including separately billable items) was  0 minutes.     DISCLAIMER: This note was prepared with Dragon NaturallySpeaking voice recognition transcription software. Garbled syntax, mangled pronouns, and other bizarre constructions may be attributed to that software system.      Andrea Ramírez MD  08/30/2018  6:30 PM          Andrea Ramírez MD  08/30/18 0902

## 2018-08-31 LAB
ALBUMIN SERPL BCP-MCNC: 3.4 G/DL
ALP SERPL-CCNC: 59 U/L
ALT SERPL W/O P-5'-P-CCNC: 12 U/L
ANION GAP SERPL CALC-SCNC: 8 MMOL/L
ANION GAP SERPL CALC-SCNC: 8 MMOL/L
AST SERPL-CCNC: 19 U/L
BILIRUB SERPL-MCNC: 4 MG/DL
BUN SERPL-MCNC: 30 MG/DL
BUN SERPL-MCNC: 30 MG/DL
CALCIUM SERPL-MCNC: 8.9 MG/DL
CALCIUM SERPL-MCNC: 8.9 MG/DL
CHLORIDE SERPL-SCNC: 104 MMOL/L
CHLORIDE SERPL-SCNC: 104 MMOL/L
CO2 SERPL-SCNC: 30 MMOL/L
CO2 SERPL-SCNC: 30 MMOL/L
CREAT SERPL-MCNC: 1.5 MG/DL
CREAT SERPL-MCNC: 1.5 MG/DL
DIASTOLIC DYSFUNCTION: YES
EST. GFR  (AFRICAN AMERICAN): 46 ML/MIN/1.73 M^2
EST. GFR  (AFRICAN AMERICAN): 46 ML/MIN/1.73 M^2
EST. GFR  (NON AFRICAN AMERICAN): 40 ML/MIN/1.73 M^2
EST. GFR  (NON AFRICAN AMERICAN): 40 ML/MIN/1.73 M^2
ESTIMATED PA SYSTOLIC PRESSURE: 42.88
GLUCOSE SERPL-MCNC: 97 MG/DL
GLUCOSE SERPL-MCNC: 97 MG/DL
MAGNESIUM SERPL-MCNC: 1.7 MG/DL
MITRAL VALVE MOBILITY: NORMAL
MITRAL VALVE REGURGITATION: ABNORMAL
POTASSIUM SERPL-SCNC: 4.2 MMOL/L
POTASSIUM SERPL-SCNC: 4.2 MMOL/L
PROT SERPL-MCNC: 6.3 G/DL
RETIRED EF AND QEF - SEE NOTES: 20 (ref 55–65)
SODIUM SERPL-SCNC: 142 MMOL/L
SODIUM SERPL-SCNC: 142 MMOL/L
TRICUSPID VALVE REGURGITATION: ABNORMAL
TROPONIN I SERPL DL<=0.01 NG/ML-MCNC: 0.62 NG/ML
TROPONIN I SERPL DL<=0.01 NG/ML-MCNC: 0.64 NG/ML

## 2018-08-31 PROCEDURE — 99223 1ST HOSP IP/OBS HIGH 75: CPT | Mod: NTX,,, | Performed by: INTERNAL MEDICINE

## 2018-08-31 PROCEDURE — 63600175 PHARM REV CODE 636 W HCPCS: Mod: NTX | Performed by: PHYSICIAN ASSISTANT

## 2018-08-31 PROCEDURE — 94640 AIRWAY INHALATION TREATMENT: CPT | Mod: NTX

## 2018-08-31 PROCEDURE — 27000221 HC OXYGEN, UP TO 24 HOURS: Mod: NTX

## 2018-08-31 PROCEDURE — 84484 ASSAY OF TROPONIN QUANT: CPT | Mod: NTX

## 2018-08-31 PROCEDURE — 83735 ASSAY OF MAGNESIUM: CPT | Mod: NTX

## 2018-08-31 PROCEDURE — 93306 TTE W/DOPPLER COMPLETE: CPT | Mod: 26,NTX,, | Performed by: INTERNAL MEDICINE

## 2018-08-31 PROCEDURE — 93306 TTE W/DOPPLER COMPLETE: CPT | Mod: NTX

## 2018-08-31 PROCEDURE — 36415 COLL VENOUS BLD VENIPUNCTURE: CPT | Mod: NTX

## 2018-08-31 PROCEDURE — 25000242 PHARM REV CODE 250 ALT 637 W/ HCPCS: Mod: NTX | Performed by: PHYSICIAN ASSISTANT

## 2018-08-31 PROCEDURE — 25000003 PHARM REV CODE 250: Mod: NTX | Performed by: PHYSICIAN ASSISTANT

## 2018-08-31 PROCEDURE — 94761 N-INVAS EAR/PLS OXIMETRY MLT: CPT | Mod: NTX

## 2018-08-31 PROCEDURE — 80053 COMPREHEN METABOLIC PANEL: CPT | Mod: NTX

## 2018-08-31 PROCEDURE — 11000001 HC ACUTE MED/SURG PRIVATE ROOM: Mod: NTX

## 2018-08-31 RX ORDER — FUROSEMIDE 10 MG/ML
60 INJECTION INTRAMUSCULAR; INTRAVENOUS 3 TIMES DAILY
Status: DISCONTINUED | OUTPATIENT
Start: 2018-08-31 | End: 2018-09-03 | Stop reason: HOSPADM

## 2018-08-31 RX ORDER — DIPHENHYDRAMINE HCL 25 MG
25 CAPSULE ORAL EVERY 6 HOURS PRN
Status: DISCONTINUED | OUTPATIENT
Start: 2018-08-31 | End: 2018-09-03 | Stop reason: HOSPADM

## 2018-08-31 RX ORDER — ISOSORBIDE DINITRATE 10 MG/1
20 TABLET ORAL 2 TIMES DAILY
Status: DISCONTINUED | OUTPATIENT
Start: 2018-08-31 | End: 2018-09-03 | Stop reason: HOSPADM

## 2018-08-31 RX ADMIN — FUROSEMIDE 60 MG: 10 INJECTION, SOLUTION INTRAMUSCULAR; INTRAVENOUS at 03:08

## 2018-08-31 RX ADMIN — FUROSEMIDE 60 MG: 10 INJECTION, SOLUTION INTRAMUSCULAR; INTRAVENOUS at 09:08

## 2018-08-31 RX ADMIN — ESCITALOPRAM 5 MG: 5 TABLET, FILM COATED ORAL at 09:08

## 2018-08-31 RX ADMIN — CARVEDILOL 25 MG: 25 TABLET, FILM COATED ORAL at 09:08

## 2018-08-31 RX ADMIN — POTASSIUM CHLORIDE 10 MEQ: 750 TABLET, EXTENDED RELEASE ORAL at 09:08

## 2018-08-31 RX ADMIN — ISOSORBIDE DINITRATE 20 MG: 10 TABLET ORAL at 09:08

## 2018-08-31 RX ADMIN — FUROSEMIDE 60 MG: 10 INJECTION, SOLUTION INTRAMUSCULAR; INTRAVENOUS at 10:08

## 2018-08-31 RX ADMIN — SPIRONOLACTONE 25 MG: 25 TABLET, FILM COATED ORAL at 09:08

## 2018-08-31 RX ADMIN — LOSARTAN POTASSIUM 25 MG: 25 TABLET ORAL at 09:08

## 2018-08-31 RX ADMIN — ATORVASTATIN CALCIUM 40 MG: 40 TABLET, FILM COATED ORAL at 09:08

## 2018-08-31 RX ADMIN — FLUTICASONE FUROATE AND VILANTEROL TRIFENATATE 1 PUFF: 100; 25 POWDER RESPIRATORY (INHALATION) at 08:08

## 2018-08-31 RX ADMIN — PANTOPRAZOLE SODIUM 40 MG: 40 TABLET, DELAYED RELEASE ORAL at 09:08

## 2018-08-31 NOTE — SUBJECTIVE & OBJECTIVE
"Past Medical History:   Diagnosis Date    Anemia     Anticoagulant long-term use     Arthritis     Atrial fibrillation     Congestive heart failure     COPD (chronic obstructive pulmonary disease)     COPD (chronic obstructive pulmonary disease)     Encounter for blood transfusion     GERD (gastroesophageal reflux disease)     Hypertension     Pheochromocytoma, malignant     Right kidney mass     Sleep apnea     Thyroid disease        Past Surgical History:   Procedure Laterality Date    APPENDECTOMY      CARDIAC DEFIBRILLATOR PLACEMENT Left 12/2016    CARDIAC DEFIBRILLATOR PLACEMENT  2016    EYE SURGERY      due to running tears    FRACTURE SURGERY Left     hand 5th digit    HYSTERECTOMY      KNEE SURGERY Left 2016    hematoma       Review of patient's allergies indicates:   Allergen Reactions    Penicillins Hives    Percocet [oxycodone-acetaminophen] Other (See Comments)     Nausea, Dizziness, Anxiety.  "I don't like how it makes me feel."   Given Hydromorphone 0.5mg IVP  Without problems.    Diovan hct [valsartan-hydrochlorothiazide] Other (See Comments)     Causes coughing    Tramadol Nausea And Vomiting       No current facility-administered medications on file prior to encounter.      Current Outpatient Medications on File Prior to Encounter   Medication Sig    albuterol-ipratropium 2.5mg-0.5mg/3mL (DUO-NEB) 0.5 mg-3 mg(2.5 mg base)/3 mL nebulizer solution Take 1 mL by nebulization every 6 (six) hours as needed for Wheezing or Shortness of Breath.    ALPRAZolam (XANAX) 1 MG tablet Take 1 mg by mouth nightly as needed for Anxiety.    aspirin (ECOTRIN) 81 MG EC tablet Take 81 mg by mouth once daily.    atorvastatin (LIPITOR) 40 MG tablet Take 1 tablet (40 mg total) by mouth once daily.    b complex vitamins tablet Take 1 tablet by mouth once daily.    carvedilol (COREG) 25 MG tablet Take 1 tablet by mouth 2 (two) times daily.    cyanocobalamin, vitamin B-12, 50 mcg tablet Take 5 " mcg by mouth once daily.    ELIQUIS 5 mg Tab TAKE 1 TABLET BY MOUTH TWICE DAILY    fluticasone-vilanterol (BREO ELLIPTA) 100-25 mcg/dose diskus inhaler Inhale 1 puff into the lungs once daily. Controller    furosemide (LASIX) 20 MG tablet Take 3 tablets (60 mg total) by mouth 3 (three) times daily.    losartan (COZAAR) 25 MG tablet Take 1 tablet (25 mg total) by mouth once daily.    NITROSTAT 0.4 mg SL tablet Take 2.5 tablets (1 mg total) by mouth every 5 (five) minutes as needed for Chest pain. No more than 3 tablets in 15 minutes.    pantoprazole (PROTONIX) 40 MG tablet Take 1 tablet by mouth once daily.    potassium chloride (KLOR-CON) 10 MEQ TbSR Take 1 tablet (10 mEq total) by mouth once daily.    spironolactone (ALDACTONE) 25 MG tablet Take 1 tablet (25 mg total) by mouth once daily.    VENTOLIN HFA 90 mcg/actuation inhaler Inhale 2 puffs into the lungs 2 (two) times daily as needed.     walker Misc 1 Device by Misc.(Non-Drug; Combo Route) route as needed.     Family History     Problem Relation (Age of Onset)    Cancer Mother    Diabetes Brother    Heart disease Father, Sister, Brother, Sister, Brother    Hypertension Father, Brother        Tobacco Use    Smoking status: Never Smoker    Smokeless tobacco: Never Used   Substance and Sexual Activity    Alcohol use: No     Comment: up to 1 yr ago drank 2-3 drinks on occasion but sporadic    Drug use: No    Sexual activity: Yes     Partners: Male     Review of Systems   Constitutional: Positive for activity change, diaphoresis and fatigue.   HENT: Positive for congestion and rhinorrhea.    Eyes: Negative for visual disturbance.   Respiratory: Positive for chest tightness and shortness of breath.    Cardiovascular: Positive for chest pain and palpitations. Negative for leg swelling.   Gastrointestinal: Positive for abdominal distention. Negative for abdominal pain.   Genitourinary: Negative for decreased urine volume and difficulty urinating.    Musculoskeletal: Negative for arthralgias and myalgias.   Skin: Negative for rash and wound.   Neurological: Positive for weakness and light-headedness. Negative for syncope.   Hematological: Does not bruise/bleed easily.   Psychiatric/Behavioral: Positive for dysphoric mood.     Objective:     Vital Signs (Most Recent):  Temp: 98.3 °F (36.8 °C) (08/30/18 1440)  Pulse: 82 (08/30/18 1830)  Resp: 20 (08/30/18 1830)  BP: (!) 154/98 (08/30/18 1830)  SpO2: 99 % (08/30/18 1830) Vital Signs (24h Range):  Temp:  [98.3 °F (36.8 °C)] 98.3 °F (36.8 °C)  Pulse:  [80-91] 82  Resp:  [20-30] 20  SpO2:  [87 %-99 %] 99 %  BP: (149-187)/() 154/98     Weight: 88.9 kg (196 lb)  Body mass index is 31.64 kg/m².    Physical Exam   Constitutional: She is oriented to person, place, and time. She appears well-developed and well-nourished. No distress.   HENT:   Head: Normocephalic and atraumatic.   Eyes:   Scleral icterus   Neck: Normal range of motion.   Cardiovascular: Normal rate and regular rhythm.   Murmur heard.  Pulmonary/Chest: Breath sounds normal. Accessory muscle usage present. She has no wheezes. She has no rales.   Abdominal: Soft. Bowel sounds are normal. She exhibits distension. There is no tenderness.   Musculoskeletal: She exhibits no edema.   Neurological: She is alert and oriented to person, place, and time. She exhibits normal muscle tone.   Skin: Skin is warm and dry. She is not diaphoretic.   Psychiatric: Her behavior is normal. She exhibits a depressed mood.   Nursing note and vitals reviewed.          Significant Labs: All pertinent labs within the past 24 hours have been reviewed.    Significant Imaging: I have reviewed all pertinent imaging results/findings within the past 24 hours.

## 2018-08-31 NOTE — HPI
Hafsa Hawley is a 51 yo female with chronic heart failure with EF of 25%, HTN, COPD, anemia, NICM, paroxysmal Afib, paroxysmal SVT, s/p ICD placement who presents today with c/o SOB, palpitations, and chest tightness. She reports palpitations which began last week intermittently accompanied by SOB, dizziness, and diaphoresis. Patient endorses abd distension. She has had similar symptoms in the past with ADHF. Palpitations are a relatively new complaint and have worsened. She reports PND as well. She has been compliant with her meds low sodium diet. Denies any shocks from ICD.   01/2015 [facility] with normal coronary arteries by Dr Cortez   Patient was given 80 mg Lasix IVP with good response.

## 2018-08-31 NOTE — ASSESSMENT & PLAN NOTE
Dyspnea  Paroxysmal supraventricular tachycardia  ICD (implantable cardioverter-defibrillator) in place  Left ventricular hypertrophy  Acute on chronic combined systolic and diastolic congestive heart failure  Dilated cardiomyopathy  Paroxysmal atrial fibrillation  Pt with several days of SOB, chest tightness, and lightheadedness. Troponin 0.658 on admission. Pt with history of severe combined CHF with EF 25. Recent admissions for SVT. EKG showing NSR.   Telemetry monitoring. Trend troponin. NPO after midnight in case of continued elevation and need for intervention. Lasix 60 mg TID IV and home spironolactone. Continue coreg and statin. Therapeutic lovenox. Plavix. Consider cardiology consult if troponin is elevated. NTG PRN. Repeat echo. US abdomen to evaluate for ascites.

## 2018-08-31 NOTE — PROGRESS NOTES
[facility] Hospital Medicine  Progress Note    Patient Name: Hfasa Hawley  MRN: [id number]  Patient Class: IP- Inpatient   Admission Date: 8/30/2018  Length of Stay: 0 days  Attending Physician: Benji Rosas MD  Primary Care Provider: ANKIT Hatch MD        Subjective:     Principal Problem:NSTEMI (non-ST elevated myocardial infarction)    HPI:  Ms. Hawley is a 53 yo black woman with chronic heart failure with EF of 25%, HTN, COPD, anemia, pheochromocytoma, paroxysmal Afib, paroxysmal SVT, s/p ICD placement who presents today with NSTEMI. She reports worsening SOB, generalized chest tightness, and intermittent left sided chest pain for the last several days. She also reports intermittent episodes of diaphoresis, palpitations, and lightheadedness that make her feel like she will pass out. She reports PND but denies edema or syncope. She was recently hospitalized at [place] for SVT. In the ED, EKG showed NSR. Tbili was 5.6, Hgb 10.0. Troponin was 0.658, up from 0.09 baseline 10 days ago. BNP was 2573, consistent with recent hospitalizations for CHF exacerbations. CTA chest showed no PE. CXR was clear. She was admitted to hospital medicine for troponin trending and possible cardiology consult.    Hospital Course:  Troponin stable at 0.6 x5. Cardiology consulted: suspect ADHF initiated by tachyarrhythmia, and will interrogate device today. Repeat echo showing EF 20%, grade 2 diastolic dysfunction.     Interval History: Today is the pt's birthday. She reports feeling less weak and tired today than she had been. Feels more comfortable breathing on the oxygen. Telemetry lines are itching her.     Review of Systems   Respiratory: Positive for cough and shortness of breath. Negative for chest tightness (improved).    Cardiovascular: Negative for chest pain (improved).   Gastrointestinal: Positive for abdominal distention.   Genitourinary: Negative for difficulty urinating.     Objective:      Vital Signs (Most Recent):  Temp: 97.7 °F (36.5 °C) (08/31/18 1214)  Pulse: 64 (08/31/18 1214)  Resp: 18 (08/31/18 1214)  BP: 134/79 (08/31/18 1214)  SpO2: (!) 92 % (08/31/18 0844) Vital Signs (24h Range):  Temp:  [96.2 °F (35.7 °C)-97.7 °F (36.5 °C)] 97.7 °F (36.5 °C)  Pulse:  [64-91] 64  Resp:  [16-30] 18  SpO2:  [87 %-99 %] 92 %  BP: (133-182)/() 134/79     Weight: 87.9 kg (193 lb 12.6 oz)  Body mass index is 31.28 kg/m².    Intake/Output Summary (Last 24 hours) at 8/31/2018 1502  Last data filed at 8/31/2018 1300  Gross per 24 hour   Intake --   Output 1700 ml   Net -1700 ml      Physical Exam   Cardiovascular: Normal rate and regular rhythm.   Murmur heard.  Pulmonary/Chest: Effort normal and breath sounds normal.   Abdominal: Soft. Bowel sounds are normal. She exhibits distension (soft).   Musculoskeletal: She exhibits no edema.       Significant Labs: All pertinent labs within the past 24 hours have been reviewed.    Significant Imaging: I have reviewed all pertinent imaging results/findings within the past 24 hours.    Assessment/Plan:      * NSTEMI (non-ST elevated myocardial infarction)    Dyspnea  Paroxysmal supraventricular tachycardia  ICD (implantable cardioverter-defibrillator) in place  Left ventricular hypertrophy  Acute on chronic combined systolic and diastolic congestive heart failure  Dilated cardiomyopathy  Paroxysmal atrial fibrillation  Pt with several days of SOB, chest tightness, and lightheadedness. Troponin 0.658 on admission. Pt with history of severe combined CHF with EF 25. Recent admissions for SVT. EKG showing NSR.   Telemetry monitoring. Trend troponin. NPO after midnight in case of continued elevation and need for intervention. Lasix 60 mg TID IV and home spironolactone. Continue coreg and statin. Therapeutic lovenox. Plavix. Consider cardiology consult if troponin is elevated. NTG PRN. Repeat echo. US abdomen to evaluate for ascites.        Total bilirubin, elevated     Unsure of etiology. Tbili 5.6 > 4.0 (baseline) today. Appears elevation is chronic.  Monitor. US of abdomen negative.         Chronic kidney disease, stage III (moderate)    Cr increased 1.2 to 1.5 today.   Monitor closely while on IV lasix. Decreased from 80 to 60 mg IV.        Acute on chronic combined systolic and diastolic congestive heart failure    Dyspnea  Paroxysmal supraventricular tachycardia  ICD (implantable cardioverter-defibrillator) in place  Left ventricular hypertrophy  Elevated troponin  Dilated cardiomyopathy  Paroxysmal atrial fibrillation  Pt with several days of SOB, chest tightness, and lightheadedness. Troponin 0.658 on admission. Pt with history of severe combined CHF with EF 25. Recent admissions for SVT. EKG showing NSR. Interrogation of device shows no tachyarrhythmia. Repeat echo showing EF 20, grade II diastolic dysfunction.   Telemetry monitoring. Trend troponin. Lasix 60 mg TID IV and home spironolactone. Continue isosorbide, coreg and statin. Therapeutic lovenox. Plavix. Cardiology consulted. NTG PRN. Repeat echo. US abdomen to evaluate for ascites.          COPD (chronic obstructive pulmonary disease)    No wheezing on exam today.   Duonebs and ventolin inhaler PRN. Monitor.        MELISSA (obstructive sleep apnea)    No home CPAP.   Monitor spo2.         Essential hypertension    Pheochromocytoma  -182.   Continue home coreg, lasix, losartan. Monitor.        Microcytic anemia    Hgb 10.0. Stable at baseline. No visible active bleeding.  Monitor.        Fibromyalgia affecting multiple sites    Major depressive disorder  No home meds. Starting lexapro for depression.          VTE Risk Mitigation (From admission, onward)    None              Fabienne Ferris PA-C  Department of Hospital Medicine   [facility]

## 2018-08-31 NOTE — ED NOTES
Pt states pain is minimal rated at a 2 out of 10 at this time. Pt is otherwise comfortable and appears to be breathing at a steady and non-tachypneic rate.

## 2018-08-31 NOTE — ASSESSMENT & PLAN NOTE
? HF exacerbation 2/2 tachyarrhythmia   Device interrogation today, if significant tachy burden will up titrate BB and have her follow up with EP   Continue Eliquis

## 2018-08-31 NOTE — ASSESSMENT & PLAN NOTE
Dyspnea  Paroxysmal supraventricular tachycardia  ICD (implantable cardioverter-defibrillator) in place  Left ventricular hypertrophy  Elevated troponin  Dilated cardiomyopathy  Paroxysmal atrial fibrillation  Pt with several days of SOB, chest tightness, and lightheadedness. Troponin 0.658 on admission. Pt with history of severe combined CHF with EF 25. Recent admissions for SVT. EKG showing NSR. Interrogation of device shows no tachyarrhythmia. Repeat echo showing EF 20, grade II diastolic dysfunction.   Telemetry monitoring. Trend troponin. Lasix 60 mg TID IV and home spironolactone. Continue isosorbide, coreg and statin. Therapeutic lovenox. Plavix. Cardiology consulted. NTG PRN. Repeat echo. US abdomen to evaluate for ascites.

## 2018-08-31 NOTE — PLAN OF CARE
Problem: Patient Care Overview  Goal: Plan of Care Review  Outcome: Ongoing (interventions implemented as appropriate)  POC discussed with pt. Pt is awake and alert,oriented X4. No distress noted. Denies any pain. Continues to be NSR on tele with HR in 60-70's. No true red alarms.Bed in lowest position. Safety/Fall precautions maintained. Call bell in reach. All questions answered. Verbalized understanding.Will continue to monitor.

## 2018-08-31 NOTE — PLAN OF CARE
08/31/18 1011   Discharge Assessment   Assessment Type Discharge Planning Assessment   Confirmed/corrected address and phone number on facesheet? Yes   Assessment information obtained from? Patient   Prior to hospitilization cognitive status: Alert/Oriented   Prior to hospitalization functional status: Assistive Equipment   Current cognitive status: Alert/Oriented   Current Functional Status: Assistive Equipment   Facility Arrived From: Home   Lives With child(ricky), dependent   Able to Return to Prior Arrangements yes   Is patient able to care for self after discharge? Yes   Who are your caregiver(s) and their phone number(s)? Jeanie Jaimes Sister     [phone]      Patient's perception of discharge disposition home or selfcare   Patient currently being followed by outpatient case management? No   Equipment Currently Used at Home rollator   Do you have any problems affording any of your prescribed medications? No   Is the patient taking medications as prescribed? yes   Does the patient have transportation home? Yes   Transportation Available family or friend will provide   Dialysis Name and Scheduled days n/a   Discharge Plan A Home with family   Patient/Family In Agreement With Plan yes

## 2018-08-31 NOTE — H&P
[facility] Hospital Medicine  History & Physical    Patient Name: Hafsa Hawley  MRN: [id number]  Admission Date: 8/30/2018  Attending Physician: Benji Rosas MD  Primary Care Provider: Haylie Lion MD         Patient information was obtained from patient, past medical records and ER records.     Subjective:     Principal Problem:NSTEMI (non-ST elevated myocardial infarction)    Chief Complaint:   Chief Complaint   Patient presents with    Shortness of Breath     with tightness in chest and mutliple other complaint x2 days        HPI: Ms. Hawley is a 51 yo black woman with chronic heart failure with EF of 25%, HTN, COPD, anemia, pheochromocytoma, paroxysmal Afib, paroxysmal SVT, s/p ICD placement who presents today with NSTEMI. She reports worsening SOB, generalized chest tightness, and intermittent left sided chest pain for the last several days. She also reports intermittent episodes of diaphoresis, palpitations, and lightheadedness that make her feel like she will pass out. She reports PND but denies edema or syncope. She was recently hospitalized at [place] for SVT. In the ED, EKG showed NSR. Tbili was 5.6, Hgb 10.0. Troponin was 0.658, up from 0.09 baseline 10 days ago. BNP was 2573, consistent with recent hospitalizations for CHF exacerbations. CTA chest showed no PE. CXR was clear. She was admitted to hospital medicine for troponin trending and possible cardiology consult.    Past Medical History:   Diagnosis Date    Anemia     Anticoagulant long-term use     Arthritis     Atrial fibrillation     Congestive heart failure     COPD (chronic obstructive pulmonary disease)     COPD (chronic obstructive pulmonary disease)     Encounter for blood transfusion     GERD (gastroesophageal reflux disease)     Hypertension     Pheochromocytoma, malignant     Right kidney mass     Sleep apnea     Thyroid disease        Past Surgical History:   Procedure Laterality  "Date    APPENDECTOMY      CARDIAC DEFIBRILLATOR PLACEMENT Left 12/2016    CARDIAC DEFIBRILLATOR PLACEMENT  2016    EYE SURGERY      due to running tears    FRACTURE SURGERY Left     hand 5th digit    HYSTERECTOMY      KNEE SURGERY Left 2016    hematoma       Review of patient's allergies indicates:   Allergen Reactions    Penicillins Hives    Percocet [oxycodone-acetaminophen] Other (See Comments)     Nausea, Dizziness, Anxiety.  "I don't like how it makes me feel."   Given Hydromorphone 0.5mg IVP  Without problems.    Diovan hct [valsartan-hydrochlorothiazide] Other (See Comments)     Causes coughing    Tramadol Nausea And Vomiting       No current facility-administered medications on file prior to encounter.      Current Outpatient Medications on File Prior to Encounter   Medication Sig    albuterol-ipratropium 2.5mg-0.5mg/3mL (DUO-NEB) 0.5 mg-3 mg(2.5 mg base)/3 mL nebulizer solution Take 1 mL by nebulization every 6 (six) hours as needed for Wheezing or Shortness of Breath.    ALPRAZolam (XANAX) 1 MG tablet Take 1 mg by mouth nightly as needed for Anxiety.    aspirin (ECOTRIN) 81 MG EC tablet Take 81 mg by mouth once daily.    atorvastatin (LIPITOR) 40 MG tablet Take 1 tablet (40 mg total) by mouth once daily.    b complex vitamins tablet Take 1 tablet by mouth once daily.    carvedilol (COREG) 25 MG tablet Take 1 tablet by mouth 2 (two) times daily.    cyanocobalamin, vitamin B-12, 50 mcg tablet Take 5 mcg by mouth once daily.    ELIQUIS 5 mg Tab TAKE 1 TABLET BY MOUTH TWICE DAILY    fluticasone-vilanterol (BREO ELLIPTA) 100-25 mcg/dose diskus inhaler Inhale 1 puff into the lungs once daily. Controller    furosemide (LASIX) 20 MG tablet Take 3 tablets (60 mg total) by mouth 3 (three) times daily.    losartan (COZAAR) 25 MG tablet Take 1 tablet (25 mg total) by mouth once daily.    NITROSTAT 0.4 mg SL tablet Take 2.5 tablets (1 mg total) by mouth every 5 (five) minutes as needed for Chest " pain. No more than 3 tablets in 15 minutes.    pantoprazole (PROTONIX) 40 MG tablet Take 1 tablet by mouth once daily.    potassium chloride (KLOR-CON) 10 MEQ TbSR Take 1 tablet (10 mEq total) by mouth once daily.    spironolactone (ALDACTONE) 25 MG tablet Take 1 tablet (25 mg total) by mouth once daily.    VENTOLIN HFA 90 mcg/actuation inhaler Inhale 2 puffs into the lungs 2 (two) times daily as needed.     walker Misc 1 Device by Misc.(Non-Drug; Combo Route) route as needed.     Family History     Problem Relation (Age of Onset)    Cancer Mother    Diabetes Brother    Heart disease Father, Sister, Brother, Sister, Brother    Hypertension Father, Brother        Tobacco Use    Smoking status: Never Smoker    Smokeless tobacco: Never Used   Substance and Sexual Activity    Alcohol use: No     Comment: up to 1 yr ago drank 2-3 drinks on occasion but sporadic    Drug use: No    Sexual activity: Yes     Partners: Male     Review of Systems   Constitutional: Positive for activity change, diaphoresis and fatigue.   HENT: Positive for congestion and rhinorrhea.    Eyes: Negative for visual disturbance.   Respiratory: Positive for chest tightness and shortness of breath.    Cardiovascular: Positive for chest pain and palpitations. Negative for leg swelling.   Gastrointestinal: Positive for abdominal distention. Negative for abdominal pain.   Genitourinary: Negative for decreased urine volume and difficulty urinating.   Musculoskeletal: Negative for arthralgias and myalgias.   Skin: Negative for rash and wound.   Neurological: Positive for weakness and light-headedness. Negative for syncope.   Hematological: Does not bruise/bleed easily.   Psychiatric/Behavioral: Positive for dysphoric mood.     Objective:     Vital Signs (Most Recent):  Temp: 98.3 °F (36.8 °C) (08/30/18 1440)  Pulse: 82 (08/30/18 1830)  Resp: 20 (08/30/18 1830)  BP: (!) 154/98 (08/30/18 1830)  SpO2: 99 % (08/30/18 1830) Vital Signs (24h  Range):  Temp:  [98.3 °F (36.8 °C)] 98.3 °F (36.8 °C)  Pulse:  [80-91] 82  Resp:  [20-30] 20  SpO2:  [87 %-99 %] 99 %  BP: (149-187)/() 154/98     Weight: 88.9 kg (196 lb)  Body mass index is 31.64 kg/m².    Physical Exam   Constitutional: She is oriented to person, place, and time. She appears well-developed and well-nourished. No distress.   HENT:   Head: Normocephalic and atraumatic.   Eyes:   Scleral icterus   Neck: Normal range of motion.   Cardiovascular: Normal rate and regular rhythm.   Murmur heard.  Pulmonary/Chest: Breath sounds normal. Accessory muscle usage present. She has no wheezes. She has no rales.   Abdominal: Soft. Bowel sounds are normal. She exhibits distension. There is no tenderness.   Musculoskeletal: She exhibits no edema.   Neurological: She is alert and oriented to person, place, and time. She exhibits normal muscle tone.   Skin: Skin is warm and dry. She is not diaphoretic.   Psychiatric: Her behavior is normal. She exhibits a depressed mood.   Nursing note and vitals reviewed.          Significant Labs: All pertinent labs within the past 24 hours have been reviewed.    Significant Imaging: I have reviewed all pertinent imaging results/findings within the past 24 hours.    Assessment/Plan:     * NSTEMI (non-ST elevated myocardial infarction)    Dyspnea  Paroxysmal supraventricular tachycardia  ICD (implantable cardioverter-defibrillator) in place  Left ventricular hypertrophy  Acute on chronic combined systolic and diastolic congestive heart failure  Dilated cardiomyopathy  Paroxysmal atrial fibrillation  Pt with several days of SOB, chest tightness, and lightheadedness. Troponin 0.658 on admission. Pt with history of severe combined CHF with EF 25. Recent admissions for SVT. EKG showing NSR.   Telemetry monitoring. Trend troponin. NPO after midnight in case of continued elevation and need for intervention. Lasix 60 mg TID IV and home spironolactone. Continue coreg and statin.  Therapeutic lovenox. Plavix. Consider cardiology consult if troponin is elevated. NTG PRN. Repeat echo. US abdomen to evaluate for ascites.        Total bilirubin, elevated    Unsure of etiology. Tbili 5.6 today. Appears elevation appears chronic.  Monitor. US of abdomen.         Chronic kidney disease, stage III (moderate)    Cr stable at 1.2.   Monitor closely while on IV lasix.         COPD (chronic obstructive pulmonary disease)    No wheezing on exam today.   Duonebs and ventolin inhaler PRN. Monitor.        MELISSA (obstructive sleep apnea)    No home CPAP.   Monitor spo2.         Essential hypertension    Pheochromocytoma  -187.   Continue home coreg, lasix, losartan. Monitor.        Microcytic anemia    Hgb 10.0. Stable at baseline. No visible active bleeding.  Monitor.        Fibromyalgia affecting multiple sites    Major depressive disorder  No home meds. Starting lexapro for depression.          VTE Risk Mitigation (From admission, onward)        Ordered     apixaban tablet 5 mg  2 times daily      08/30/18 4920             Fabienne Ferris PA-C  Department of Hospital Medicine   [facility]

## 2018-08-31 NOTE — SUBJECTIVE & OBJECTIVE
"Past Medical History:   Diagnosis Date    Anemia     Anticoagulant long-term use     Arthritis     Atrial fibrillation     Congestive heart failure     COPD (chronic obstructive pulmonary disease)     COPD (chronic obstructive pulmonary disease)     Encounter for blood transfusion     GERD (gastroesophageal reflux disease)     Hypertension     Pheochromocytoma, malignant     Right kidney mass     Sleep apnea     Thyroid disease        Past Surgical History:   Procedure Laterality Date    APPENDECTOMY      CARDIAC DEFIBRILLATOR PLACEMENT Left 12/2016    CARDIAC DEFIBRILLATOR PLACEMENT  2016    EYE SURGERY      due to running tears    FRACTURE SURGERY Left     hand 5th digit    HYSTERECTOMY      KNEE SURGERY Left 2016    hematoma       Review of patient's allergies indicates:   Allergen Reactions    Penicillins Hives    Percocet [oxycodone-acetaminophen] Other (See Comments)     Nausea, Dizziness, Anxiety.  "I don't like how it makes me feel."   Given Hydromorphone 0.5mg IVP  Without problems.    Diovan hct [valsartan-hydrochlorothiazide] Other (See Comments)     Causes coughing    Tramadol Nausea And Vomiting       No current facility-administered medications on file prior to encounter.      Current Outpatient Medications on File Prior to Encounter   Medication Sig    albuterol-ipratropium 2.5mg-0.5mg/3mL (DUO-NEB) 0.5 mg-3 mg(2.5 mg base)/3 mL nebulizer solution Take 1 mL by nebulization every 6 (six) hours as needed for Wheezing or Shortness of Breath.    ALPRAZolam (XANAX) 1 MG tablet Take 1 mg by mouth nightly as needed for Anxiety.    aspirin (ECOTRIN) 81 MG EC tablet Take 81 mg by mouth once daily.    atorvastatin (LIPITOR) 40 MG tablet Take 1 tablet (40 mg total) by mouth once daily.    b complex vitamins tablet Take 1 tablet by mouth once daily.    carvedilol (COREG) 25 MG tablet Take 1 tablet by mouth 2 (two) times daily.    cyanocobalamin, vitamin B-12, 50 mcg tablet Take 5 " mcg by mouth once daily.    ELIQUIS 5 mg Tab TAKE 1 TABLET BY MOUTH TWICE DAILY    fluticasone-vilanterol (BREO ELLIPTA) 100-25 mcg/dose diskus inhaler Inhale 1 puff into the lungs once daily. Controller    furosemide (LASIX) 20 MG tablet Take 3 tablets (60 mg total) by mouth 3 (three) times daily.    losartan (COZAAR) 25 MG tablet Take 1 tablet (25 mg total) by mouth once daily.    NITROSTAT 0.4 mg SL tablet Take 2.5 tablets (1 mg total) by mouth every 5 (five) minutes as needed for Chest pain. No more than 3 tablets in 15 minutes.    pantoprazole (PROTONIX) 40 MG tablet Take 1 tablet by mouth once daily.    potassium chloride (KLOR-CON) 10 MEQ TbSR Take 1 tablet (10 mEq total) by mouth once daily.    spironolactone (ALDACTONE) 25 MG tablet Take 1 tablet (25 mg total) by mouth once daily.    VENTOLIN HFA 90 mcg/actuation inhaler Inhale 2 puffs into the lungs 2 (two) times daily as needed.     walker Misc 1 Device by Misc.(Non-Drug; Combo Route) route as needed.     Family History     Problem Relation (Age of Onset)    Cancer Mother    Diabetes Brother    Heart disease Father, Sister, Brother, Sister, Brother    Hypertension Father, Brother        Tobacco Use    Smoking status: Never Smoker    Smokeless tobacco: Never Used   Substance and Sexual Activity    Alcohol use: No     Comment: up to 1 yr ago drank 2-3 drinks on occasion but sporadic    Drug use: No    Sexual activity: Yes     Partners: Male     Review of Systems   Constitution: Positive for diaphoresis.   HENT: Negative.    Eyes: Negative.    Cardiovascular: Positive for chest pain, dyspnea on exertion, near-syncope, orthopnea, palpitations and paroxysmal nocturnal dyspnea. Negative for leg swelling and syncope.   Respiratory: Positive for cough, shortness of breath, sleep disturbances due to breathing and sputum production.    Endocrine: Negative.    Hematologic/Lymphatic: Negative.    Skin: Negative.    Musculoskeletal: Negative.     Gastrointestinal: Positive for bloating.   Genitourinary: Negative.    Neurological: Positive for dizziness and light-headedness.   Psychiatric/Behavioral: Negative.    Allergic/Immunologic: Negative.      Objective:     Vital Signs (Most Recent):  Temp: 96.2 °F (35.7 °C) (08/31/18 0437)  Pulse: 65 (08/31/18 0844)  Resp: 18 (08/31/18 0844)  BP: (!) 162/84 (08/31/18 0705)  SpO2: (!) 92 % (08/31/18 0844) Vital Signs (24h Range):  Temp:  [96.2 °F (35.7 °C)-98.3 °F (36.8 °C)] 96.2 °F (35.7 °C)  Pulse:  [64-91] 65  Resp:  [16-30] 18  SpO2:  [87 %-99 %] 92 %  BP: (133-187)/() 162/84     Weight: 87.9 kg (193 lb 12.6 oz)  Body mass index is 31.28 kg/m².    SpO2: (!) 92 %  O2 Device (Oxygen Therapy): room air(3L NC on SB )      Intake/Output Summary (Last 24 hours) at 8/31/2018 1023  Last data filed at 8/31/2018 0500  Gross per 24 hour   Intake --   Output 1200 ml   Net -1200 ml       Lines/Drains/Airways     Peripheral Intravenous Line                 Peripheral IV - Single Lumen 08/30/18 1515 Left Wrist less than 1 day                Physical Exam   Constitutional: She is oriented to person, place, and time. She appears well-developed and well-nourished. No distress.   HENT:   Head: Normocephalic and atraumatic.   Eyes: Right eye exhibits no discharge. Left eye exhibits no discharge.   Neck: JVD present.   Cardiovascular: Normal rate and regular rhythm. Exam reveals no gallop and no friction rub.   Murmur heard.  Pulmonary/Chest: Effort normal. She has wheezes.   Abdominal: Bowel sounds are normal. She exhibits distension. There is no tenderness.   Musculoskeletal: She exhibits no edema or tenderness.   Neurological: She is alert and oriented to person, place, and time.   Skin: Skin is warm and dry. She is not diaphoretic.   Psychiatric: She has a normal mood and affect. Her behavior is normal. Judgment and thought content normal.       Significant Labs:   BMP:   Recent Labs   Lab  08/30/18   1538  08/31/18   4593   08/31/18   0329   GLU  103   --   97  97   NA  143   --   142  142   K  4.3   --   4.2  4.2   CL  107   --   104  104   CO2  24   --   30*  30*   BUN  29*   --   30*  30*   CREATININE  1.2   --   1.5*  1.5*   CALCIUM  9.6   --   8.9  8.9   MG   --   1.7   --    , CMP   Recent Labs   Lab  08/30/18   1538  08/31/18   0329   NA  143  142  142   K  4.3  4.2  4.2   CL  107  104  104   CO2  24  30*  30*   GLU  103  97  97   BUN  29*  30*  30*   CREATININE  1.2  1.5*  1.5*   CALCIUM  9.6  8.9  8.9   PROT  6.9  6.3   ALBUMIN  4.0  3.4*   BILITOT  5.6*  4.0*   ALKPHOS  66  59   AST  24  19   ALT  14  12   ANIONGAP  12  8  8   ESTGFRAFRICA  >60  46*  46*   EGFRNONAA  52*  40*  40*   , CBC   Recent Labs   Lab  08/30/18   1538   WBC  5.99   HGB  10.0*   HCT  33.8*   PLT  228   , INR No results for input(s): INR, PROTIME in the last 48 hours., Lipid Panel No results for input(s): CHOL, HDL, LDLCALC, TRIG, CHOLHDL in the last 48 hours., Troponin   Recent Labs   Lab  08/30/18   1538  08/30/18   2115  08/31/18   0329   TROPONINI  0.658*  0.638*  0.618*    and All pertinent lab results from the last 24 hours have been reviewed.    Significant Imaging: Echocardiogram:   2D echo with color flow doppler:   Results for orders placed or performed during the hospital encounter of 06/11/18   2D echo with color flow doppler   Result Value Ref Range    EF 25 (A) 55 - 65    Mitral Valve Regurgitation MILD TO MODERATE     Pericardial Effusion SMALL (A)

## 2018-08-31 NOTE — CONSULTS
[facility]  Cardiology  Consult Note    Patient Name: Hafsa Hawley  MRN: [id number]  Admission Date: 8/30/2018  Hospital Length of Stay: 0 days  Code Status: Full Code   Attending Provider: Benji Rosas MD   Consulting Provider: Juan Cheema NP  Primary Care Physician: ANKIT Hatch MD  Principal Problem:NSTEMI (non-ST elevated myocardial infarction)    Patient information was obtained from patient, past medical records and ER records.     Inpatient consult to [facility]  Consult performed by: Juan Cheema NP  Consult ordered by: Fabienne Ferris PA-C        Subjective:     Chief Complaint:  SOB, palpitations, chest pain     HPI:   Hafsa Hawley is a 51 yo female with chronic heart failure with EF of 25%, HTN, COPD, anemia, NICM, paroxysmal Afib, paroxysmal SVT, s/p ICD placement who presents today with c/o SOB, palpitations, and chest tightness. She reports palpitations which began last week intermittently accompanied by SOB, dizziness, and diaphoresis. Patient endorses abd distension. She has had similar symptoms in the past with ADHF. Palpitations are a relatively new complaint and have worsened. She reports PND as well. She has been compliant with her meds low sodium diet. Denies any shocks from ICD.   01/2015 C with normal coronary arteries by Dr Cortez   Patient was given 80 mg Lasix IVP with good response.     Past Medical History:   Diagnosis Date    Anemia     Anticoagulant long-term use     Arthritis     Atrial fibrillation     Congestive heart failure     COPD (chronic obstructive pulmonary disease)     COPD (chronic obstructive pulmonary disease)     Encounter for blood transfusion     GERD (gastroesophageal reflux disease)     Hypertension     Pheochromocytoma, malignant     Right kidney mass     Sleep apnea     Thyroid disease        Past Surgical History:   Procedure Laterality Date    APPENDECTOMY      CARDIAC DEFIBRILLATOR PLACEMENT  "Left 12/2016    CARDIAC DEFIBRILLATOR PLACEMENT  2016    EYE SURGERY      due to running tears    FRACTURE SURGERY Left     hand 5th digit    HYSTERECTOMY      KNEE SURGERY Left 2016    hematoma       Review of patient's allergies indicates:   Allergen Reactions    Penicillins Hives    Percocet [oxycodone-acetaminophen] Other (See Comments)     Nausea, Dizziness, Anxiety.  "I don't like how it makes me feel."   Given Hydromorphone 0.5mg IVP  Without problems.    Diovan hct [valsartan-hydrochlorothiazide] Other (See Comments)     Causes coughing    Tramadol Nausea And Vomiting       No current facility-administered medications on file prior to encounter.      Current Outpatient Medications on File Prior to Encounter   Medication Sig    albuterol-ipratropium 2.5mg-0.5mg/3mL (DUO-NEB) 0.5 mg-3 mg(2.5 mg base)/3 mL nebulizer solution Take 1 mL by nebulization every 6 (six) hours as needed for Wheezing or Shortness of Breath.    ALPRAZolam (XANAX) 1 MG tablet Take 1 mg by mouth nightly as needed for Anxiety.    aspirin (ECOTRIN) 81 MG EC tablet Take 81 mg by mouth once daily.    atorvastatin (LIPITOR) 40 MG tablet Take 1 tablet (40 mg total) by mouth once daily.    b complex vitamins tablet Take 1 tablet by mouth once daily.    carvedilol (COREG) 25 MG tablet Take 1 tablet by mouth 2 (two) times daily.    cyanocobalamin, vitamin B-12, 50 mcg tablet Take 5 mcg by mouth once daily.    ELIQUIS 5 mg Tab TAKE 1 TABLET BY MOUTH TWICE DAILY    fluticasone-vilanterol (BREO ELLIPTA) 100-25 mcg/dose diskus inhaler Inhale 1 puff into the lungs once daily. Controller    furosemide (LASIX) 20 MG tablet Take 3 tablets (60 mg total) by mouth 3 (three) times daily.    losartan (COZAAR) 25 MG tablet Take 1 tablet (25 mg total) by mouth once daily.    NITROSTAT 0.4 mg SL tablet Take 2.5 tablets (1 mg total) by mouth every 5 (five) minutes as needed for Chest pain. No more than 3 tablets in 15 minutes.    pantoprazole " (PROTONIX) 40 MG tablet Take 1 tablet by mouth once daily.    potassium chloride (KLOR-CON) 10 MEQ TbSR Take 1 tablet (10 mEq total) by mouth once daily.    spironolactone (ALDACTONE) 25 MG tablet Take 1 tablet (25 mg total) by mouth once daily.    VENTOLIN HFA 90 mcg/actuation inhaler Inhale 2 puffs into the lungs 2 (two) times daily as needed.     walker Misc 1 Device by Misc.(Non-Drug; Combo Route) route as needed.     Family History     Problem Relation (Age of Onset)    Cancer Mother    Diabetes Brother    Heart disease Father, Sister, Brother, Sister, Brother    Hypertension Father, Brother        Tobacco Use    Smoking status: Never Smoker    Smokeless tobacco: Never Used   Substance and Sexual Activity    Alcohol use: No     Comment: up to 1 yr ago drank 2-3 drinks on occasion but sporadic    Drug use: No    Sexual activity: Yes     Partners: Male     Review of Systems   Constitution: Positive for diaphoresis.   HENT: Negative.    Eyes: Negative.    Cardiovascular: Positive for chest pain, dyspnea on exertion, near-syncope, orthopnea, palpitations and paroxysmal nocturnal dyspnea. Negative for leg swelling and syncope.   Respiratory: Positive for cough, shortness of breath, sleep disturbances due to breathing and sputum production.    Endocrine: Negative.    Hematologic/Lymphatic: Negative.    Skin: Negative.    Musculoskeletal: Negative.    Gastrointestinal: Positive for bloating.   Genitourinary: Negative.    Neurological: Positive for dizziness and light-headedness.   Psychiatric/Behavioral: Negative.    Allergic/Immunologic: Negative.      Objective:     Vital Signs (Most Recent):  Temp: 96.2 °F (35.7 °C) (08/31/18 0437)  Pulse: 65 (08/31/18 0844)  Resp: 18 (08/31/18 0844)  BP: (!) 162/84 (08/31/18 0705)  SpO2: (!) 92 % (08/31/18 0844) Vital Signs (24h Range):  Temp:  [96.2 °F (35.7 °C)-98.3 °F (36.8 °C)] 96.2 °F (35.7 °C)  Pulse:  [64-91] 65  Resp:  [16-30] 18  SpO2:  [87 %-99 %] 92 %  BP:  (133-187)/() 162/84     Weight: 87.9 kg (193 lb 12.6 oz)  Body mass index is 31.28 kg/m².    SpO2: (!) 92 %  O2 Device (Oxygen Therapy): room air(3L NC on SB )      Intake/Output Summary (Last 24 hours) at 8/31/2018 1023  Last data filed at 8/31/2018 0500  Gross per 24 hour   Intake --   Output 1200 ml   Net -1200 ml       Lines/Drains/Airways     Peripheral Intravenous Line                 Peripheral IV - Single Lumen 08/30/18 1515 Left Wrist less than 1 day                Physical Exam   Constitutional: She is oriented to person, place, and time. She appears well-developed and well-nourished. No distress.   HENT:   Head: Normocephalic and atraumatic.   Eyes: Right eye exhibits no discharge. Left eye exhibits no discharge.   Neck: JVD present.   Cardiovascular: Normal rate and regular rhythm. Exam reveals no gallop and no friction rub.   Murmur heard.  Pulmonary/Chest: Effort normal. She has wheezes.   Abdominal: Bowel sounds are normal. She exhibits distension. There is no tenderness.   Musculoskeletal: She exhibits no edema or tenderness.   Neurological: She is alert and oriented to person, place, and time.   Skin: Skin is warm and dry. She is not diaphoretic.   Psychiatric: She has a normal mood and affect. Her behavior is normal. Judgment and thought content normal.       Significant Labs:   BMP:   Recent Labs   Lab  08/30/18   1538  08/31/18   0328  08/31/18   0329   GLU  103   --   97  97   NA  143   --   142  142   K  4.3   --   4.2  4.2   CL  107   --   104  104   CO2  24   --   30*  30*   BUN  29*   --   30*  30*   CREATININE  1.2   --   1.5*  1.5*   CALCIUM  9.6   --   8.9  8.9   MG   --   1.7   --    , CMP   Recent Labs   Lab  08/30/18   1538  08/31/18   0329   NA  143  142  142   K  4.3  4.2  4.2   CL  107  104  104   CO2  24  30*  30*   GLU  103  97  97   BUN  29*  30*  30*   CREATININE  1.2  1.5*  1.5*   CALCIUM  9.6  8.9  8.9   PROT  6.9  6.3   ALBUMIN  4.0  3.4*   BILITOT  5.6*   4.0*   ALKPHOS  66  59   AST  24  19   ALT  14  12   ANIONGAP  12  8  8   ESTGFRAFRICA  >60  46*  46*   EGFRNONAA  52*  40*  40*   , CBC   Recent Labs   Lab  08/30/18   1538   WBC  5.99   HGB  10.0*   HCT  33.8*   PLT  228   , INR No results for input(s): INR, PROTIME in the last 48 hours., Lipid Panel No results for input(s): CHOL, HDL, LDLCALC, TRIG, CHOLHDL in the last 48 hours., Troponin   Recent Labs   Lab  08/30/18   1538  08/30/18   2115  08/31/18   0329   TROPONINI  0.658*  0.638*  0.618*    and All pertinent lab results from the last 24 hours have been reviewed.    Significant Imaging: Echocardiogram:   2D echo with color flow doppler:   Results for orders placed or performed during the hospital encounter of 06/11/18   2D echo with color flow doppler   Result Value Ref Range    EF 25 (A) 55 - 65    Mitral Valve Regurgitation MILD TO MODERATE     Pericardial Effusion SMALL (A)      Assessment and Plan:     * NSTEMI (non-ST elevated myocardial infarction)    Trop flat at 0.6  EKG SR LVH without acute ischemic changes    Elevated trop in setting of ADHF        Dilated cardiomyopathy    [facility] in 2015 without evidence of CAD  LVEF 20-25% and is followed by transplant/Advanced HF  Repeat echo pending    Patient with cardiac wheeze and hepatic congestion from ADHF    ? HF exacerbation 2/2 tachyarrhythmia   Will diurese with IV Lasix     Continue BB, ARB, spironolactone             Paroxysmal atrial fibrillation    ? HF exacerbation 2/2 tachyarrhythmia   Device interrogation today, if significant tachy burden will up titrate BB and have her follow up with EP   Continue Eliquis         Essential hypertension    Continue BB, ARB             VTE Risk Mitigation (From admission, onward)    None          Thank you for your consult. I will follow-up with patient. Please contact us if you have any additional questions.    Juan Cheema NP  Cardiology   [facility]

## 2018-08-31 NOTE — PLAN OF CARE
Problem: Patient Care Overview  Goal: Plan of Care Review  Outcome: Ongoing (interventions implemented as appropriate)  Patient on oxygen with documented flow.  Will attempt to wean per O2 order protocol. The proper method of use, as well as anticipated side effects, of this metered-dose inhaler are discussed and demonstrated to the patient.

## 2018-08-31 NOTE — NURSING
[facility] RN clinician visited patient at bedside to provide heart failure education.  Signs and symptoms of heart failure discussed in detail.  Discussed importance of taking daily weights and what to do if there is a 2-3 pound weight gain in a day or 5 pound or more weight gain in one week.  Patient states she has a scale at home, but does not weigh herself everyday.  Stressed the importance of taking and tracking daily weights for detection of heart failure, patient verbalized understanding. Patient educated on low sodium diet and fluid limitation.  Instructions given on performing activities as tolerated, otherwise, instructed by physician.  Urged patient on importance of medication compliance related to heart failure diagnosis.  Informed patient that if she is prescribed any new medications to make sure she picks them up and takes them as prescribed.  Patient given opportunity to ask questions, stated understanding of education provided.  Patient provided with the following [facility] educational handouts:  Green, Yellow, or Red Heart Failure Zone, 8 Step Plan for Heart Failure Patients booklet, [facility] Bedside Delivery, and personal business card.

## 2018-08-31 NOTE — SUBJECTIVE & OBJECTIVE
Interval History: Today is the pt's birthday. She reports feeling less weak and tired today than she had been. Feels more comfortable breathing on the oxygen. Telemetry lines are itching her.     Review of Systems   Respiratory: Positive for cough and shortness of breath. Negative for chest tightness (improved).    Cardiovascular: Negative for chest pain (improved).   Gastrointestinal: Positive for abdominal distention.   Genitourinary: Negative for difficulty urinating.     Objective:     Vital Signs (Most Recent):  Temp: 97.7 °F (36.5 °C) (08/31/18 1214)  Pulse: 64 (08/31/18 1214)  Resp: 18 (08/31/18 1214)  BP: 134/79 (08/31/18 1214)  SpO2: (!) 92 % (08/31/18 0844) Vital Signs (24h Range):  Temp:  [96.2 °F (35.7 °C)-97.7 °F (36.5 °C)] 97.7 °F (36.5 °C)  Pulse:  [64-91] 64  Resp:  [16-30] 18  SpO2:  [87 %-99 %] 92 %  BP: (133-182)/() 134/79     Weight: 87.9 kg (193 lb 12.6 oz)  Body mass index is 31.28 kg/m².    Intake/Output Summary (Last 24 hours) at 8/31/2018 1502  Last data filed at 8/31/2018 1300  Gross per 24 hour   Intake --   Output 1700 ml   Net -1700 ml      Physical Exam   Cardiovascular: Normal rate and regular rhythm.   Murmur heard.  Pulmonary/Chest: Effort normal and breath sounds normal.   Abdominal: Soft. Bowel sounds are normal. She exhibits distension (soft).   Musculoskeletal: She exhibits no edema.       Significant Labs: All pertinent labs within the past 24 hours have been reviewed.    Significant Imaging: I have reviewed all pertinent imaging results/findings within the past 24 hours.

## 2018-08-31 NOTE — HOSPITAL COURSE
08/31/2018 Patient admitted with ADHF. -1200 cc from admission. Trop 0.6. EKG SR LVH, no acute ischemic changes. Echo pending. Rep to interrogate device today.

## 2018-08-31 NOTE — ASSESSMENT & PLAN NOTE
Unsure of etiology. Tbili 5.6 > 4.0 (baseline) today. Appears elevation is chronic.  Monitor. US of abdomen negative.

## 2018-08-31 NOTE — ASSESSMENT & PLAN NOTE
[facility] in 2015 without evidence of CAD  LVEF 20-25% and is followed by transplant/Advanced HF  Repeat echo pending    Patient with cardiac wheeze and hepatic congestion from ADHF    ? HF exacerbation 2/2 tachyarrhythmia   Will diurese with IV Lasix     Continue BB, ARB

## 2018-08-31 NOTE — NURSING
Assumed care of patient from LAURIE Weber. Patient is AAOx4. Denies chest pain at this time. On cardiac monitoring, no true red alarms noted. Resting comfortably in bed. Fall precautions explained and maintained. Advised patient to use call light for assistance, patient verbalized complete understanding. Will continue to monitor.

## 2018-09-01 LAB
ANION GAP SERPL CALC-SCNC: 11 MMOL/L
BUN SERPL-MCNC: 27 MG/DL
CALCIUM SERPL-MCNC: 9.1 MG/DL
CHLORIDE SERPL-SCNC: 104 MMOL/L
CO2 SERPL-SCNC: 27 MMOL/L
CREAT SERPL-MCNC: 1.2 MG/DL
EST. GFR  (AFRICAN AMERICAN): 60 ML/MIN/1.73 M^2
EST. GFR  (NON AFRICAN AMERICAN): 52 ML/MIN/1.73 M^2
GLUCOSE SERPL-MCNC: 91 MG/DL
MAGNESIUM SERPL-MCNC: 1.6 MG/DL
POTASSIUM SERPL-SCNC: 3.7 MMOL/L
SODIUM SERPL-SCNC: 142 MMOL/L

## 2018-09-01 PROCEDURE — 25000003 PHARM REV CODE 250: Mod: NTX | Performed by: FAMILY MEDICINE

## 2018-09-01 PROCEDURE — 83735 ASSAY OF MAGNESIUM: CPT | Mod: NTX

## 2018-09-01 PROCEDURE — 36415 COLL VENOUS BLD VENIPUNCTURE: CPT | Mod: NTX

## 2018-09-01 PROCEDURE — 27000221 HC OXYGEN, UP TO 24 HOURS: Mod: NTX

## 2018-09-01 PROCEDURE — 25000003 PHARM REV CODE 250: Mod: NTX | Performed by: PHYSICIAN ASSISTANT

## 2018-09-01 PROCEDURE — 11000001 HC ACUTE MED/SURG PRIVATE ROOM: Mod: NTX

## 2018-09-01 PROCEDURE — 94640 AIRWAY INHALATION TREATMENT: CPT | Mod: NTX

## 2018-09-01 PROCEDURE — 80048 BASIC METABOLIC PNL TOTAL CA: CPT | Mod: NTX

## 2018-09-01 PROCEDURE — 63600175 PHARM REV CODE 636 W HCPCS: Mod: NTX | Performed by: PHYSICIAN ASSISTANT

## 2018-09-01 PROCEDURE — 94761 N-INVAS EAR/PLS OXIMETRY MLT: CPT | Mod: NTX

## 2018-09-01 RX ORDER — ALPRAZOLAM 0.25 MG/1
0.5 TABLET ORAL ONCE
Status: COMPLETED | OUTPATIENT
Start: 2018-09-01 | End: 2018-09-01

## 2018-09-01 RX ADMIN — FUROSEMIDE 60 MG: 10 INJECTION, SOLUTION INTRAMUSCULAR; INTRAVENOUS at 03:09

## 2018-09-01 RX ADMIN — FUROSEMIDE 60 MG: 10 INJECTION, SOLUTION INTRAMUSCULAR; INTRAVENOUS at 10:09

## 2018-09-01 RX ADMIN — CARVEDILOL 25 MG: 25 TABLET, FILM COATED ORAL at 10:09

## 2018-09-01 RX ADMIN — ATORVASTATIN CALCIUM 40 MG: 40 TABLET, FILM COATED ORAL at 10:09

## 2018-09-01 RX ADMIN — SPIRONOLACTONE 25 MG: 25 TABLET, FILM COATED ORAL at 10:09

## 2018-09-01 RX ADMIN — PANTOPRAZOLE SODIUM 40 MG: 40 TABLET, DELAYED RELEASE ORAL at 10:09

## 2018-09-01 RX ADMIN — POTASSIUM CHLORIDE 10 MEQ: 750 TABLET, EXTENDED RELEASE ORAL at 10:09

## 2018-09-01 RX ADMIN — CARVEDILOL 25 MG: 25 TABLET, FILM COATED ORAL at 09:09

## 2018-09-01 RX ADMIN — DIPHENHYDRAMINE HYDROCHLORIDE 25 MG: 25 CAPSULE ORAL at 12:09

## 2018-09-01 RX ADMIN — APIXABAN 5 MG: 5 TABLET, FILM COATED ORAL at 11:09

## 2018-09-01 RX ADMIN — FLUTICASONE FUROATE AND VILANTEROL TRIFENATATE 1 PUFF: 100; 25 POWDER RESPIRATORY (INHALATION) at 08:09

## 2018-09-01 RX ADMIN — ISOSORBIDE DINITRATE 20 MG: 10 TABLET ORAL at 10:09

## 2018-09-01 RX ADMIN — APIXABAN 5 MG: 5 TABLET, FILM COATED ORAL at 09:09

## 2018-09-01 RX ADMIN — LOSARTAN POTASSIUM 25 MG: 25 TABLET ORAL at 10:09

## 2018-09-01 RX ADMIN — ISOSORBIDE DINITRATE 20 MG: 10 TABLET ORAL at 09:09

## 2018-09-01 RX ADMIN — FUROSEMIDE 60 MG: 10 INJECTION, SOLUTION INTRAMUSCULAR; INTRAVENOUS at 09:09

## 2018-09-01 RX ADMIN — ALPRAZOLAM 0.5 MG: 0.25 TABLET ORAL at 10:09

## 2018-09-01 NOTE — PLAN OF CARE
Problem: Fall Risk (Adult)  Goal: Absence of Falls  Patient will demonstrate the desired outcomes by discharge/transition of care.  Bed alarm on for safety. Instructed to call before getting out of bed. Verbalized understanding.

## 2018-09-01 NOTE — SUBJECTIVE & OBJECTIVE
Interval History: Reports continued SOB when getting up to use the bathroom. Feels comfortable when using supplemental O2.     Review of Systems   Respiratory: Positive for chest tightness and shortness of breath.    Cardiovascular: Negative for chest pain.   Gastrointestinal: Positive for abdominal distention. Negative for abdominal pain.   Genitourinary: Negative for difficulty urinating.     Objective:     Vital Signs (Most Recent):  Temp: 96.5 °F (35.8 °C) (09/01/18 1115)  Pulse: 73 (09/01/18 1200)  Resp: 20 (09/01/18 1115)  BP: (!) 146/74 (09/01/18 1115)  SpO2: 98 % (09/01/18 1115) Vital Signs (24h Range):  Temp:  [96 °F (35.6 °C)-96.5 °F (35.8 °C)] 96.5 °F (35.8 °C)  Pulse:  [54-73] 73  Resp:  [16-20] 20  SpO2:  [98 %-99 %] 98 %  BP: (124-146)/(69-85) 146/74     Weight: 88 kg (194 lb 0.1 oz)  Body mass index is 31.31 kg/m².    Intake/Output Summary (Last 24 hours) at 9/1/2018 1522  Last data filed at 9/1/2018 1200  Gross per 24 hour   Intake 485 ml   Output 550 ml   Net -65 ml      Physical Exam   Constitutional: She appears well-developed and well-nourished.   Cardiovascular: Normal rate and regular rhythm.   Murmur with exaggerated S2   Pulmonary/Chest: She has decreased breath sounds.   Abdominal: She exhibits distension.   Musculoskeletal: She exhibits no edema.       Significant Labs: All pertinent labs within the past 24 hours have been reviewed.    Significant Imaging: I have reviewed all pertinent imaging results/findings within the past 24 hours.

## 2018-09-01 NOTE — PROGRESS NOTES
[facility] Hospital Medicine  Progress Note    Patient Name: Hafsa Hawley  MRN: [id number]  Patient Class: IP- Inpatient   Admission Date: 8/30/2018  Length of Stay: 1 days  Attending Physician: Benji Rosas MD  Primary Care Provider: ANKIT Hatch MD        Subjective:     Principal Problem:Acute on chronic combined systolic and diastolic congestive heart failure    HPI:  Ms. Hawley is a 53 yo black woman with chronic heart failure with EF of 25%, HTN, COPD, anemia, pheochromocytoma, paroxysmal Afib, paroxysmal SVT, s/p ICD placement who presents today with NSTEMI. She reports worsening SOB, generalized chest tightness, and intermittent left sided chest pain for the last several days. She also reports intermittent episodes of diaphoresis, palpitations, and lightheadedness that make her feel like she will pass out. She reports PND but denies edema or syncope. She was recently hospitalized at [place] for SVT. In the ED, EKG showed NSR. Tbili was 5.6, Hgb 10.0. Troponin was 0.658, up from 0.09 baseline 10 days ago. BNP was 2573, consistent with recent hospitalizations for CHF exacerbations. CTA chest showed no PE. CXR was clear. She was admitted to hospital medicine for troponin trending and possible cardiology consult.    Hospital Course:  Troponin stable at 0.6 x5. Cardiology consulted: suspect ADHF initiated by tachyarrhythmia, and interrogated device 8/31. Interrogation revealed no tachyarrhythmia. Repeat echo showing EF 20%, grade 2 diastolic dysfunction. Continuing to diurese.     Interval History: Reports continued SOB when getting up to use the bathroom. Feels comfortable when using supplemental O2.     Review of Systems   Respiratory: Positive for chest tightness and shortness of breath.    Cardiovascular: Negative for chest pain.   Gastrointestinal: Positive for abdominal distention. Negative for abdominal pain.   Genitourinary: Negative for difficulty urinating.      Objective:     Vital Signs (Most Recent):  Temp: 96.5 °F (35.8 °C) (09/01/18 1115)  Pulse: 73 (09/01/18 1200)  Resp: 20 (09/01/18 1115)  BP: (!) 146/74 (09/01/18 1115)  SpO2: 98 % (09/01/18 1115) Vital Signs (24h Range):  Temp:  [96 °F (35.6 °C)-96.5 °F (35.8 °C)] 96.5 °F (35.8 °C)  Pulse:  [54-73] 73  Resp:  [16-20] 20  SpO2:  [98 %-99 %] 98 %  BP: (124-146)/(69-85) 146/74     Weight: 88 kg (194 lb 0.1 oz)  Body mass index is 31.31 kg/m².    Intake/Output Summary (Last 24 hours) at 9/1/2018 1522  Last data filed at 9/1/2018 1200  Gross per 24 hour   Intake 485 ml   Output 550 ml   Net -65 ml      Physical Exam   Constitutional: She appears well-developed and well-nourished.   Cardiovascular: Normal rate and regular rhythm.   Murmur with exaggerated S2   Pulmonary/Chest: She has decreased breath sounds.   Abdominal: She exhibits distension.   Musculoskeletal: She exhibits no edema.       Significant Labs: All pertinent labs within the past 24 hours have been reviewed.    Significant Imaging: I have reviewed all pertinent imaging results/findings within the past 24 hours.    Assessment/Plan:      * Acute on chronic combined systolic and diastolic congestive heart failure    Dyspnea  Paroxysmal supraventricular tachycardia  ICD (implantable cardioverter-defibrillator) in place  Left ventricular hypertrophy  Elevated troponin  Dilated cardiomyopathy  Paroxysmal atrial fibrillation  Pt with several days of SOB, chest tightness, and lightheadedness. Troponin 0.658 on admission and stable there. Pt with history of severe combined CHF with EF 25. Recent admissions for SVT. EKG showing NSR. Interrogation of device shows no tachyarrhythmia. Repeat echo showing EF 20, grade II diastolic dysfunction. NSR on telemetry. Distended abdomen, US negative for ascites.  Telemetry monitoring. Continue diuresis with lasix 60 mg TID IV and home spironolactone. Continue isosorbide, coreg and statin. Therapeutic lovenox switched to  home eliquis. Plavix. Cardiology consulted. NTG PRN.        NSTEMI (non-ST elevated myocardial infarction)    Dyspnea  Paroxysmal supraventricular tachycardia  ICD (implantable cardioverter-defibrillator) in place  Left ventricular hypertrophy  Acute on chronic combined systolic and diastolic congestive heart failure  Dilated cardiomyopathy  Paroxysmal atrial fibrillation  Pt with several days of SOB, chest tightness, and lightheadedness. Troponin 0.658 on admission. Pt with history of severe combined CHF with EF 25. Recent admissions for SVT. EKG showing NSR.   Telemetry monitoring. Trend troponin. NPO after midnight in case of continued elevation and need for intervention. Lasix 60 mg TID IV and home spironolactone. Continue coreg and statin. Therapeutic lovenox. Plavix. Consider cardiology consult if troponin is elevated. NTG PRN. Repeat echo. US abdomen to evaluate for ascites.        Total bilirubin, elevated    Unsure of etiology. Tbili 5.6 > 4.0 (baseline) today. Appears elevation is chronic.  Monitor. US of abdomen negative.         Chronic kidney disease, stage III (moderate)    Cr 1.2 > 1.5 > 1.5 today.   Monitor closely while on IV lasix. Decreased from 80 to 60 mg IV.        COPD (chronic obstructive pulmonary disease)    No wheezing on exam today.   Duonebs and ventolin inhaler PRN. Monitor.        MELISSA (obstructive sleep apnea)    No home CPAP.   Monitor spo2.         Essential hypertension    Pheochromocytoma  -146.   Continue home coreg, lasix, losartan. Monitor.        Microcytic anemia    Hgb 10.0. Stable at baseline. No visible active bleeding.  Monitor.        Fibromyalgia affecting multiple sites    Major depressive disorder  No home meds. Starting lexapro for depression.          VTE Risk Mitigation (From admission, onward)        Ordered     apixaban tablet 5 mg  2 times daily      09/01/18 110              Fabienne Ferris PA-C  Department of Hospital Medicine   [facility]

## 2018-09-01 NOTE — PLAN OF CARE
Problem: Patient Care Overview  Goal: Plan of Care Review  Outcome: Ongoing (interventions implemented as appropriate)  The proper method of use, as well as anticipated side effects, of this metered-dose inhaler are discussed and demonstrated to the patient. Patient on oxygen with documented flow.  Will attempt to wean per O2 order protocol.

## 2018-09-01 NOTE — PLAN OF CARE
Problem: Fall Risk (Adult)  Goal: Absence of Falls  Patient will demonstrate the desired outcomes by discharge/transition of care.  Bed alarm on and bed in lowest position. Call bell in reach

## 2018-09-01 NOTE — ASSESSMENT & PLAN NOTE
Dyspnea  Paroxysmal supraventricular tachycardia  ICD (implantable cardioverter-defibrillator) in place  Left ventricular hypertrophy  Elevated troponin  Dilated cardiomyopathy  Paroxysmal atrial fibrillation  Pt with several days of SOB, chest tightness, and lightheadedness. Troponin 0.658 on admission and stable there. Pt with history of severe combined CHF with EF 25. Recent admissions for SVT. EKG showing NSR. Interrogation of device shows no tachyarrhythmia. Repeat echo showing EF 20, grade II diastolic dysfunction. NSR on telemetry. Distended abdomen, US negative for ascites.  Telemetry monitoring. Continue diuresis with lasix 60 mg TID IV and home spironolactone. Continue isosorbide, coreg and statin. Therapeutic lovenox switched to home eliquis. Plavix. Cardiology consulted. NTG PRN.

## 2018-09-02 LAB
ANION GAP SERPL CALC-SCNC: 11 MMOL/L
BUN SERPL-MCNC: 30 MG/DL
CALCIUM SERPL-MCNC: 9 MG/DL
CHLORIDE SERPL-SCNC: 101 MMOL/L
CO2 SERPL-SCNC: 28 MMOL/L
CREAT SERPL-MCNC: 1.2 MG/DL
EST. GFR  (AFRICAN AMERICAN): 60 ML/MIN/1.73 M^2
EST. GFR  (NON AFRICAN AMERICAN): 52 ML/MIN/1.73 M^2
GLUCOSE SERPL-MCNC: 88 MG/DL
MAGNESIUM SERPL-MCNC: 1.7 MG/DL
POTASSIUM SERPL-SCNC: 3.7 MMOL/L
SODIUM SERPL-SCNC: 140 MMOL/L
TROPONIN I SERPL DL<=0.01 NG/ML-MCNC: 0.54 NG/ML

## 2018-09-02 PROCEDURE — 27000221 HC OXYGEN, UP TO 24 HOURS: Mod: NTX

## 2018-09-02 PROCEDURE — 84484 ASSAY OF TROPONIN QUANT: CPT | Mod: NTX

## 2018-09-02 PROCEDURE — 36415 COLL VENOUS BLD VENIPUNCTURE: CPT | Mod: NTX

## 2018-09-02 PROCEDURE — 11000001 HC ACUTE MED/SURG PRIVATE ROOM: Mod: NTX

## 2018-09-02 PROCEDURE — 83735 ASSAY OF MAGNESIUM: CPT | Mod: NTX

## 2018-09-02 PROCEDURE — 93010 ELECTROCARDIOGRAM REPORT: CPT | Mod: NTX,,, | Performed by: INTERNAL MEDICINE

## 2018-09-02 PROCEDURE — 63600175 PHARM REV CODE 636 W HCPCS: Mod: NTX | Performed by: PHYSICIAN ASSISTANT

## 2018-09-02 PROCEDURE — 63600175 PHARM REV CODE 636 W HCPCS: Mod: NTX | Performed by: HOSPITALIST

## 2018-09-02 PROCEDURE — 93005 ELECTROCARDIOGRAM TRACING: CPT | Mod: NTX

## 2018-09-02 PROCEDURE — 94640 AIRWAY INHALATION TREATMENT: CPT | Mod: NTX

## 2018-09-02 PROCEDURE — 25000003 PHARM REV CODE 250: Mod: NTX | Performed by: PHYSICIAN ASSISTANT

## 2018-09-02 PROCEDURE — 99233 SBSQ HOSP IP/OBS HIGH 50: CPT | Mod: NTX,,, | Performed by: INTERNAL MEDICINE

## 2018-09-02 PROCEDURE — 94761 N-INVAS EAR/PLS OXIMETRY MLT: CPT | Mod: NTX

## 2018-09-02 PROCEDURE — 80048 BASIC METABOLIC PNL TOTAL CA: CPT | Mod: NTX

## 2018-09-02 RX ADMIN — FUROSEMIDE 60 MG: 10 INJECTION, SOLUTION INTRAMUSCULAR; INTRAVENOUS at 03:09

## 2018-09-02 RX ADMIN — NAPHAZOLINE HYDROCHLORIDE AND PHENIRAMINE MALEATE 1 DROP: .25; 3 SOLUTION/ DROPS OPHTHALMIC at 04:09

## 2018-09-02 RX ADMIN — FLUTICASONE FUROATE AND VILANTEROL TRIFENATATE 1 PUFF: 100; 25 POWDER RESPIRATORY (INHALATION) at 07:09

## 2018-09-02 RX ADMIN — POTASSIUM CHLORIDE 10 MEQ: 750 TABLET, EXTENDED RELEASE ORAL at 08:09

## 2018-09-02 RX ADMIN — SPIRONOLACTONE 25 MG: 25 TABLET, FILM COATED ORAL at 08:09

## 2018-09-02 RX ADMIN — FUROSEMIDE 60 MG: 10 INJECTION, SOLUTION INTRAMUSCULAR; INTRAVENOUS at 08:09

## 2018-09-02 RX ADMIN — APIXABAN 5 MG: 5 TABLET, FILM COATED ORAL at 08:09

## 2018-09-02 RX ADMIN — NAPHAZOLINE HYDROCHLORIDE AND PHENIRAMINE MALEATE 1 DROP: .25; 3 SOLUTION/ DROPS OPHTHALMIC at 12:09

## 2018-09-02 RX ADMIN — PANTOPRAZOLE SODIUM 40 MG: 40 TABLET, DELAYED RELEASE ORAL at 08:09

## 2018-09-02 RX ADMIN — NITROGLYCERIN 0.4 MG: 0.4 TABLET SUBLINGUAL at 12:09

## 2018-09-02 RX ADMIN — ISOSORBIDE DINITRATE 20 MG: 10 TABLET ORAL at 08:09

## 2018-09-02 RX ADMIN — LOSARTAN POTASSIUM 25 MG: 25 TABLET ORAL at 08:09

## 2018-09-02 RX ADMIN — ATORVASTATIN CALCIUM 40 MG: 40 TABLET, FILM COATED ORAL at 08:09

## 2018-09-02 RX ADMIN — CARVEDILOL 25 MG: 25 TABLET, FILM COATED ORAL at 08:09

## 2018-09-02 RX ADMIN — NAPHAZOLINE HYDROCHLORIDE AND PHENIRAMINE MALEATE 1 DROP: .25; 3 SOLUTION/ DROPS OPHTHALMIC at 08:09

## 2018-09-02 NOTE — SUBJECTIVE & OBJECTIVE
Interval History: Improved swelling and exercise tolerance today.     Review of Systems   Respiratory: Positive for shortness of breath.    Cardiovascular: Positive for chest pain and palpitations. Negative for leg swelling.   Gastrointestinal: Positive for abdominal distention.     Objective:     Vital Signs (Most Recent):  Temp: 96.4 °F (35.8 °C) (09/02/18 0725)  Pulse: 60 (09/02/18 1600)  Resp: 16 (09/02/18 1235)  BP: 112/64 (09/02/18 1235)  SpO2: 97 % (09/02/18 0750) Vital Signs (24h Range):  Temp:  [96.4 °F (35.8 °C)-98.2 °F (36.8 °C)] 96.4 °F (35.8 °C)  Pulse:  [51-64] 60  Resp:  [14-20] 16  SpO2:  [94 %-98 %] 97 %  BP: (112-150)/(64-86) 112/64     Weight: 84 kg (185 lb 3 oz)  Body mass index is 29.89 kg/m².    Intake/Output Summary (Last 24 hours) at 9/2/2018 1653  Last data filed at 9/1/2018 1800  Gross per 24 hour   Intake 180 ml   Output --   Net 180 ml      Physical Exam   Cardiovascular: Normal rate and regular rhythm.   Murmur heard.  Pulmonary/Chest: Effort normal and breath sounds normal.   Abdominal: Soft. She exhibits no distension. There is no tenderness.   Musculoskeletal: She exhibits no edema.       Significant Labs: All pertinent labs within the past 24 hours have been reviewed.    Significant Imaging: I have reviewed all pertinent imaging results/findings within the past 24 hours.

## 2018-09-02 NOTE — PLAN OF CARE
Problem: Patient Care Overview  Goal: Plan of Care Review  Outcome: Ongoing (interventions implemented as appropriate)  Plan of care reviewed with patient. Patient verbalized understanding. Fall precautions maintained. Bed in lowest position, call light within reach, 2x bed rails, pt wearing slip resistant socks. Patient notified to ask staff for assistance and pt verbalized complete understanding. Pt on telemetry, bradycardic. Will continue to monitor.

## 2018-09-02 NOTE — PLAN OF CARE
Problem: Patient Care Overview  Goal: Plan of Care Review  Plan of care reviewed . No c/o chest pain at present. Heart rhythm now sinus adarsh HR in the 50's. Will continue to monitor.

## 2018-09-02 NOTE — PLAN OF CARE
"C/o  Heaviness, tightness to Lt chest radiating to lt shoulder, to hand and back of Lt shoulder. States"6-7/10 on pain scale". VS /91, pulse 60, resp 18/ min easy and nonlabored. Spo2 98% on O2 @ 2L/nc. Will continue to monitor.  "

## 2018-09-02 NOTE — ASSESSMENT & PLAN NOTE
Cr 1.2 > 1.5 > 1.5 > 1.2 today.   Monitor closely while on IV lasix. Decreased from 80 to 60 mg IV.

## 2018-09-02 NOTE — PROGRESS NOTES
[facility] Hospital Medicine  Progress Note    Patient Name: Hafsa Hawley  MRN: [id number]  Patient Class: IP- Inpatient   Admission Date: 8/30/2018  Length of Stay: 2 days  Attending Physician: Bejni Rosas MD  Primary Care Provider: ANKIT Hatch MD        Subjective:     Principal Problem:Acute on chronic combined systolic and diastolic congestive heart failure    HPI:  Ms. Hawely is a 51 yo black woman with chronic heart failure with EF of 25%, HTN, COPD, anemia, pheochromocytoma, paroxysmal Afib, paroxysmal SVT, s/p ICD placement who presents today with NSTEMI. She reports worsening SOB, generalized chest tightness, and intermittent left sided chest pain for the last several days. She also reports intermittent episodes of diaphoresis, palpitations, and lightheadedness that make her feel like she will pass out. She reports PND but denies edema or syncope. She was recently hospitalized at [facility] for SVT. In the ED, EKG showed NSR. Tbili was 5.6, Hgb 10.0. Troponin was 0.658, up from 0.09 baseline 10 days ago. BNP was 2573, consistent with recent hospitalizations for CHF exacerbations. CTA chest showed no PE. CXR was clear. She was admitted to hospital medicine for troponin trending and possible cardiology consult.    Hospital Course:  Troponin stable at 0.6 x5. Cardiology consulted: suspect ADHF initiated by tachyarrhythmia, and interrogated device 8/31. Interrogation revealed no tachyarrhythmia. Repeat echo showing EF 20%, grade 2 diastolic dysfunction. Continuing to diurese. Had episode of vtach and chest pain overnight 9/1. Troponin stable at 0.5.     Interval History: Improved swelling and exercise tolerance today.     Review of Systems   Respiratory: Positive for shortness of breath.    Cardiovascular: Positive for chest pain and palpitations. Negative for leg swelling.   Gastrointestinal: Positive for abdominal distention.     Objective:     Vital Signs (Most  Recent):  Temp: 96.4 °F (35.8 °C) (09/02/18 0725)  Pulse: 60 (09/02/18 1600)  Resp: 16 (09/02/18 1235)  BP: 112/64 (09/02/18 1235)  SpO2: 97 % (09/02/18 0750) Vital Signs (24h Range):  Temp:  [96.4 °F (35.8 °C)-98.2 °F (36.8 °C)] 96.4 °F (35.8 °C)  Pulse:  [51-64] 60  Resp:  [14-20] 16  SpO2:  [94 %-98 %] 97 %  BP: (112-150)/(64-86) 112/64     Weight: 84 kg (185 lb 3 oz)  Body mass index is 29.89 kg/m².    Intake/Output Summary (Last 24 hours) at 9/2/2018 1653  Last data filed at 9/1/2018 1800  Gross per 24 hour   Intake 180 ml   Output --   Net 180 ml      Physical Exam   Cardiovascular: Normal rate and regular rhythm.   Murmur heard.  Pulmonary/Chest: Effort normal and breath sounds normal.   Abdominal: Soft. She exhibits no distension. There is no tenderness.   Musculoskeletal: She exhibits no edema.       Significant Labs: All pertinent labs within the past 24 hours have been reviewed.    Significant Imaging: I have reviewed all pertinent imaging results/findings within the past 24 hours.    Assessment/Plan:      * Acute on chronic combined systolic and diastolic congestive heart failure    Dyspnea  Paroxysmal supraventricular tachycardia  ICD (implantable cardioverter-defibrillator) in place  Left ventricular hypertrophy  Elevated troponin  Dilated cardiomyopathy  Paroxysmal atrial fibrillation  Pt with several days of SOB, chest tightness, and lightheadedness. Troponin 0.658 on admission and stable there. Pt with history of severe combined CHF with EF 25. Recent admissions for SVT. EKG showing NSR. Interrogation of device shows no tachyarrhythmia. Repeat echo showing EF 20, grade II diastolic dysfunction. NSR on telemetry. Distended abdomen, US negative for ascites.  Telemetry monitoring. Continue diuresis with lasix 60 mg TID IV and home spironolactone. Continue isosorbide, coreg and statin. Therapeutic lovenox switched to home eliquis. Plavix. Cardiology consulted. NTG PRN.        MELISSA (obstructive sleep  apnea)    No home CPAP.   Monitor spo2.         NSTEMI (non-ST elevated myocardial infarction)    Dyspnea  Paroxysmal supraventricular tachycardia  ICD (implantable cardioverter-defibrillator) in place  Left ventricular hypertrophy  Acute on chronic combined systolic and diastolic congestive heart failure  Dilated cardiomyopathy  Paroxysmal atrial fibrillation  Pt with several days of SOB, chest tightness, and lightheadedness. Troponin 0.658 on admission. Pt with history of severe combined CHF with EF 25. Recent admissions for SVT. EKG showing NSR.   Telemetry monitoring. Trend troponin. NPO after midnight in case of continued elevation and need for intervention. Lasix 60 mg TID IV and home spironolactone. Continue coreg and statin. Therapeutic lovenox. Plavix. Consider cardiology consult if troponin is elevated. NTG PRN. Repeat echo. US abdomen to evaluate for ascites.        Total bilirubin, elevated    Unsure of etiology. Tbili 5.6 > 4.0 (baseline) today. Appears elevation is chronic.  Monitor. US of abdomen negative.         Chronic kidney disease, stage III (moderate)    Cr 1.2 > 1.5 > 1.5 > 1.2 today.   Monitor closely while on IV lasix. Decreased from 80 to 60 mg IV.        COPD (chronic obstructive pulmonary disease)    No wheezing on exam today.   Duonebs and ventolin inhaler PRN. Monitor.        Essential hypertension    Pheochromocytoma  -150.   Continue home coreg, lasix, losartan. Monitor.        Microcytic anemia    Hgb 10.0. Stable at baseline. No visible active bleeding.  Monitor.        Fibromyalgia affecting multiple sites    Major depressive disorder  No home meds. Starting lexapro for depression.          VTE Risk Mitigation (From admission, onward)        Ordered     apixaban tablet 5 mg  2 times daily      09/01/18 1104              Fabienne Ferris PA-C  Department of Hospital Medicine   [facility]

## 2018-09-02 NOTE — PLAN OF CARE
"Dr Hunt call ed and notified of pt's c/o chest tightness/ heaviness and recent vs." will order 12 lead EKG and troponin".  "

## 2018-09-02 NOTE — PLAN OF CARE
"Pt had 8 beats of V-Tach thia am, was asymptomatic. Dr notified, new order received. States"just watch her for now". Will continue to monitor.  "

## 2018-09-02 NOTE — PROGRESS NOTES
"Progress Note  Cardiology    Admit Date: 8/30/2018   LOS: 2 days     Follow-up For:  CHF with decompensation    Scheduled Meds:   apixaban  5 mg Oral BID    atorvastatin  40 mg Oral Daily    carvedilol  25 mg Oral BID    escitalopram oxalate  5 mg Oral Daily    fluticasone-vilanterol  1 puff Inhalation Daily    furosemide  60 mg Intravenous TID    isosorbide dinitrate  20 mg Oral BID    losartan  25 mg Oral Daily    naphazoline-pheniramine 0.025-0.3%  1 drop Both Eyes QID    pantoprazole  40 mg Oral Daily    potassium chloride SA  10 mEq Oral Daily    spironolactone  25 mg Oral Daily     Continuous Infusions:    Review of patient's allergies indicates:   Allergen Reactions    Penicillins Hives    Percocet [oxycodone-acetaminophen] Other (See Comments)     Nausea, Dizziness, Anxiety.  "I don't like how it makes me feel."   Given Hydromorphone 0.5mg IVP  Without problems.    Diovan hct [valsartan-hydrochlorothiazide] Other (See Comments)     Causes coughing    Tramadol Nausea And Vomiting       SUBJECTIVE:     Interval History: Ms. Hawley reports feeling better today.  No chest pain or SOB.      OBJECTIVE:     Vital Signs (Most Recent)  Temp: 96.4 °F (35.8 °C) (09/02/18 0725)  Pulse: (!) 56 (09/02/18 1235)  Resp: 16 (09/02/18 1235)  BP: 112/64 (09/02/18 1235)  SpO2: 97 % (09/02/18 0750)    Vital Signs Range (Last 24H):  Temp:  [96.4 °F (35.8 °C)-98.2 °F (36.8 °C)]   Pulse:  [51-64]   Resp:  [14-20]   BP: (112-150)/(64-86)   SpO2:  [94 %-98 %]     I & O (Last 24H):    Intake/Output Summary (Last 24 hours) at 9/2/2018 1404  Last data filed at 9/1/2018 1800  Gross per 24 hour   Intake 180 ml   Output 300 ml   Net -120 ml       Physical Exam:  Constitutional: No acute distress, conversant  HEENT: Sclera anicteric, Pupils equal, round and reactive to light, extraocular motions intact, Oropharynx clear  Neck: No JVD, no carotid bruits  Cardiovascular: regular rate and rhythm, no murmur, rubs or gallops, " normal S1/S2  Pulmonary: Few crackles at bases bilaterally  Abdominal: Abdomen soft, nontender, nondistended, positive bowel sounds  Extremities: No lower extremity edema,   Pulses:    Carotid pulses are 2+ on the right side, and 2+ on the left side.    Radial pulses are 2+ on the right side, and 2+ on the left side.    Femoral pulses are 2+ on the right side, and 2+ on the left side.  Skin: No ecchymosis, erythema, or ulcers  Psych: Alert and oriented x 3, appropriate affect  Neuro: CNII-XII intact, no focal deficits      Laboratory:  Chemistry:  Lab Results   Component Value Date     09/02/2018    K 3.7 09/02/2018     09/02/2018    CO2 28 09/02/2018    BUN 30 (H) 09/02/2018    CREATININE 1.2 09/02/2018    CALCIUM 9.0 09/02/2018    BNP 2,573 (H) 08/30/2018     Cardiac Markers:  Lab Results   Component Value Date    CKTOTAL 47 02/19/2016    CKMB 1.82 02/19/2016    TROPONINI 0.543 (H) 09/02/2018     Cardiac Markers (Last 3):  Lab Results   Component Value Date    CKTOTAL 47 02/19/2016    CKMB 1.82 02/19/2016    TROPONINI 0.543 (H) 09/02/2018    TROPONINI 0.639 (H) 08/31/2018    TROPONINI 0.618 (H) 08/31/2018     CBC:   Lab Results   Component Value Date    WBC 5.99 08/30/2018    HGB 10.0 (L) 08/30/2018    HCT 33.8 (L) 08/30/2018    MCV 57 (L) 08/30/2018     08/30/2018     Lipids:  Lab Results   Component Value Date    CHOL 165 12/07/2017    TRIG 166 (H) 12/07/2017    HDL 54 12/07/2017     Coagulation:   Lab Results   Component Value Date    INR 1.1 08/20/2018    APTT 28.9 06/11/2018       Diagnostic Results:  Labs: Reviewed  ECG: Reviewed      ASSESSMENT/PLAN:   Hafsa Hawley is a 51 yo female with chronic heart failure with EF of 25%, HTN, COPD, anemia, NICM, paroxysmal Afib, paroxysmal SVT, s/p ICD placement who presents with c/o SOB, palpitations, and chest tightness.  Presentation consistent with decompensated heart failure.     Plan:  Continue diuresis  Monitor lytes to keep Mg>2, K>4  Monitor  renal function closely (Cr now improve to 1.2)  Continue current medication regimen      Andrea Pacheco MD, PhD  Interventional Cardiology

## 2018-09-02 NOTE — PLAN OF CARE
Problem: Fall Risk (Adult)  Goal: Absence of Falls  Patient will demonstrate the desired outcomes by discharge/transition of care.  Bed alarm on and bed in lowest position. Call bell in reach. Instructed to call for assistance before getting out of bed. Verbalized understanding.

## 2018-09-02 NOTE — PLAN OF CARE
Problem: Patient Care Overview  Goal: Plan of Care Review  Outcome: Ongoing (interventions implemented as appropriate)  Patient on oxygen with documented flow.  Will attempt to wean per O2 order protocol. The proper method of use, as well as anticipated side effects, of this metered-dose inhaler are discussed and demonstrated to the patient. Will continue to monitor.

## 2018-09-02 NOTE — PLAN OF CARE
Problem: Cardiac: Heart Failure (Adult)  Goal: Signs and Symptoms of Listed Potential Problems Will be Absent, Minimized or Managed (Cardiac: Heart Failure)  Signs and symptoms of listed potential problems will be absent, minimized or managed by discharge/transition of care (reference Cardiac: Heart Failure (Adult) CPG).  Monitor for any  Respiratory compromise, dysrhythmia and fluid/electrolyte imbalance.

## 2018-09-02 NOTE — PLAN OF CARE
"" chest pain is gone" vs  /76, P 61, resp18/min and nonlabored.EKG done and blood drawn for troponin by Phlebotomist.will continue to monitor.  "

## 2018-09-02 NOTE — PLAN OF CARE
Notified by offgoing nurse Natalia that pt had 9 beats runs of V-Tach, Vs taken and Dr Hunt notified.

## 2018-09-02 NOTE — PLAN OF CARE
Problem: Patient Care Overview  Goal: Plan of Care Review  Pt on documented O2. No apparent distress noted. Will continue to monitor.

## 2018-09-02 NOTE — PLAN OF CARE
"Had 8 beats run of V-Tach. Asymptomatic.States "i just got back from the bathroom"Vs /69, Pulse 55 bpm,  And resp 18/min. Spo2 96% on O2@ 2L/nc. Denies chest pain. Will continue to monitor.  "

## 2018-09-03 VITALS
WEIGHT: 185.19 LBS | BODY MASS INDEX: 29.76 KG/M2 | OXYGEN SATURATION: 97 % | HEART RATE: 66 BPM | DIASTOLIC BLOOD PRESSURE: 81 MMHG | SYSTOLIC BLOOD PRESSURE: 131 MMHG | TEMPERATURE: 97 F | HEIGHT: 66 IN | RESPIRATION RATE: 20 BRPM

## 2018-09-03 LAB
ANION GAP SERPL CALC-SCNC: 12 MMOL/L
BUN SERPL-MCNC: 30 MG/DL
CALCIUM SERPL-MCNC: 9.7 MG/DL
CHLORIDE SERPL-SCNC: 100 MMOL/L
CO2 SERPL-SCNC: 27 MMOL/L
CREAT SERPL-MCNC: 1.2 MG/DL
EST. GFR  (AFRICAN AMERICAN): 60 ML/MIN/1.73 M^2
EST. GFR  (NON AFRICAN AMERICAN): 52 ML/MIN/1.73 M^2
GLUCOSE SERPL-MCNC: 122 MG/DL
MAGNESIUM SERPL-MCNC: 1.9 MG/DL
POTASSIUM SERPL-SCNC: 4.1 MMOL/L
SODIUM SERPL-SCNC: 139 MMOL/L

## 2018-09-03 PROCEDURE — 80048 BASIC METABOLIC PNL TOTAL CA: CPT | Mod: NTX

## 2018-09-03 PROCEDURE — 27000221 HC OXYGEN, UP TO 24 HOURS: Mod: NTX

## 2018-09-03 PROCEDURE — 63600175 PHARM REV CODE 636 W HCPCS: Mod: NTX | Performed by: PHYSICIAN ASSISTANT

## 2018-09-03 PROCEDURE — 94761 N-INVAS EAR/PLS OXIMETRY MLT: CPT | Mod: NTX

## 2018-09-03 PROCEDURE — 36415 COLL VENOUS BLD VENIPUNCTURE: CPT | Mod: NTX

## 2018-09-03 PROCEDURE — 83735 ASSAY OF MAGNESIUM: CPT | Mod: NTX

## 2018-09-03 PROCEDURE — 25000003 PHARM REV CODE 250: Mod: NTX | Performed by: PHYSICIAN ASSISTANT

## 2018-09-03 RX ORDER — ACETAMINOPHEN 325 MG/1
650 TABLET ORAL EVERY 6 HOURS PRN
Status: DISCONTINUED | OUTPATIENT
Start: 2018-09-03 | End: 2018-09-03 | Stop reason: HOSPADM

## 2018-09-03 RX ORDER — ESCITALOPRAM OXALATE 5 MG/1
5 TABLET ORAL DAILY
Qty: 30 TABLET | Refills: 1 | Status: SHIPPED | OUTPATIENT
Start: 2018-09-04 | End: 2018-09-07

## 2018-09-03 RX ORDER — ISOSORBIDE DINITRATE 20 MG/1
20 TABLET ORAL 2 TIMES DAILY
Qty: 60 TABLET | Refills: 11 | Status: SHIPPED | OUTPATIENT
Start: 2018-09-03 | End: 2018-09-07 | Stop reason: SDUPTHER

## 2018-09-03 RX ADMIN — FUROSEMIDE 60 MG: 10 INJECTION, SOLUTION INTRAMUSCULAR; INTRAVENOUS at 09:09

## 2018-09-03 RX ADMIN — SPIRONOLACTONE 25 MG: 25 TABLET, FILM COATED ORAL at 09:09

## 2018-09-03 RX ADMIN — LOSARTAN POTASSIUM 25 MG: 25 TABLET ORAL at 09:09

## 2018-09-03 RX ADMIN — APIXABAN 5 MG: 5 TABLET, FILM COATED ORAL at 09:09

## 2018-09-03 RX ADMIN — ESCITALOPRAM 5 MG: 5 TABLET, FILM COATED ORAL at 09:09

## 2018-09-03 RX ADMIN — ISOSORBIDE DINITRATE 20 MG: 10 TABLET ORAL at 09:09

## 2018-09-03 RX ADMIN — POTASSIUM CHLORIDE 10 MEQ: 750 TABLET, EXTENDED RELEASE ORAL at 09:09

## 2018-09-03 RX ADMIN — PANTOPRAZOLE SODIUM 40 MG: 40 TABLET, DELAYED RELEASE ORAL at 09:09

## 2018-09-03 RX ADMIN — CARVEDILOL 25 MG: 25 TABLET, FILM COATED ORAL at 09:09

## 2018-09-03 RX ADMIN — ATORVASTATIN CALCIUM 40 MG: 40 TABLET, FILM COATED ORAL at 09:09

## 2018-09-03 RX ADMIN — FLUTICASONE FUROATE AND VILANTEROL TRIFENATATE 1 PUFF: 100; 25 POWDER RESPIRATORY (INHALATION) at 08:09

## 2018-09-03 RX ADMIN — NAPHAZOLINE HYDROCHLORIDE AND PHENIRAMINE MALEATE 1 DROP: .25; 3 SOLUTION/ DROPS OPHTHALMIC at 01:09

## 2018-09-03 RX ADMIN — NAPHAZOLINE HYDROCHLORIDE AND PHENIRAMINE MALEATE 1 DROP: .25; 3 SOLUTION/ DROPS OPHTHALMIC at 09:09

## 2018-09-03 NOTE — ASSESSMENT & PLAN NOTE
Dyspnea  Paroxysmal supraventricular tachycardia  ICD (implantable cardioverter-defibrillator) in place  Left ventricular hypertrophy  Elevated troponin  Dilated cardiomyopathy  Paroxysmal atrial fibrillation  Pt with several days of SOB, chest tightness, and lightheadedness. Troponin 0.658 on admission and stable there. Pt with history of severe combined CHF with EF 25. Recent admissions for SVT. EKG showing NSR. Interrogation of device shows no tachyarrhythmia. Repeat echo showing EF 20, grade II diastolic dysfunction. NSR on telemetry. Distended abdomen on admission, US negative for ascites.    Continue isosorbide, coreg and statin, lasix, and spironolactone. Therapeutic lovenox switched to home eliquis. Plavix. Cardiology consulted and recommend continuing current regimen. NTG PRN. F/U cardiology outpatient.

## 2018-09-03 NOTE — PLAN OF CARE
Patient is discharged to home. No needs noted upon discharge. Follow-up with PCP scheduled.     Future Appointments   Date Time Provider Department Center   9/7/2018  9:30 AM LAB, APPOINTMENT [place] [facility]   9/7/2018 11:00 AM Bertha Lorenzo MD [facility] [address]   9/12/2018  2:00 PM Salima Cote MD [facility] IM [facility]   9/19/2018  1:20 PM Neil Otero MD [facility]   9/21/2018  2:00 PM LAB, APPOINTMENT [facility] [place] [facility]   9/21/2018  3:00 PM Laura Lindo NP [facility] HEPAT [address]   10/29/2018  8:00 AM HOME MONITOR DEVICE CHECK, [facility] [address]   1/28/2019  8:00 AM HOME MONITOR DEVICE CHECK, [facility] [address]   6/28/2019 10:00 AM [facility] CT1 GE VCT64 SLICE CT LIMIT 450 LBS [facility] CT SCAN [facility]   7/1/2019 10:30 AM Missael Landry MD [facility] TUMOR [facility]     Follow-up With  Details  Why  Contact Info   Benson Cortez MD  Call in 1 week  patient's normal Cardiologist  [address] [place] [address] RD  [id number]  [facility] [address]  [phone]   Timbo Turk III, MD  Go on 9/19/2018  @ 1:20pm, with Dr. Neil Otero (resident)  [address]  [address]  [facility] [address]  [phone]   Salima Cote MD  On 9/12/2018  at 2:00 pm, [facility]  [address]  [address]  [facility] [address]  [phone]      09/03/18 1234   Final Note   Assessment Type Final Discharge Note   Discharge Disposition Home   What phone number can be called within the next 1-3 days to see how you are doing after discharge? [phone]   Hospital Follow Up  Appt(s) scheduled? No  (PCP office closed. Patient to schedule own appointments. )   Right Care Referral Info   Post Acute Recommendation No Care     Luciana Miguel RN  Transition Navigator  [phone]

## 2018-09-03 NOTE — NURSING
Home Oxygen Evaluation    Date Performed: 9/3/2018    1) Patient's Home O2 Sat on room air, while at rest: 95        If O2 sats on room air at rest are 88% or below, patient qualifies. No additional testing needed. Document N/A in steps 2 and 3. If 89% or above, complete steps 2.      2) Patient's O2 Sat on room air while exercisin        If O2 sats on room air while exercising remain 89% or above patient does not qualify, no further testing needed Document N/A in step 3. If O2 sats on room air while exercising are 88% or below, continue to step 3.      3) Patient's O2 Sat while exercising on O2:         (Must show improvement from #2 for patients to qualify)    If O2 sats improve on oxygen, patient qualifies for portable oxygen. If not, the patient does not qualify.

## 2018-09-03 NOTE — PLAN OF CARE
Problem: Patient Care Overview  Goal: Plan of Care Review  Outcome: Ongoing (interventions implemented as appropriate)  Plan of care reviewed with patient, understanding verbalized. Pt remains SR/SB on tele. No complaints overnight. Instructed to call for assistance before getting out of bed. Bed alarm on, call light in reach, fall precautions in place. Will continue to monitor.

## 2018-09-03 NOTE — PLAN OF CARE
Problem: Patient Care Overview  Goal: Plan of Care Review  Outcome: Outcome(s) achieved Date Met: 09/03/18  Discharge instruction and education provided. Patient voices understanding. IV removed, cath tip intact. Telemetry was discontinued without adverse reaction. Pt shows no signs of acute distress. Pt currently waiting for ride.

## 2018-09-03 NOTE — ASSESSMENT & PLAN NOTE
Unsure of etiology. Tbili 5.6 > 4.0 (baseline) today. Appears elevation is chronic.  Monitor outpatient.

## 2018-09-03 NOTE — DISCHARGE SUMMARY
[facility] Hospital Medicine  Discharge Summary      Patient Name: Hafsa Hawley  MRN: [id number]  Admission Date: 8/30/2018  Hospital Length of Stay: 3 days  Discharge Date and Time: No discharge date for patient encounter.  Attending Physician: Benji Rosas MD   Discharging Provider: Fabienne Ferris PA-C  Primary Care Provider: ANKIT Hatch MD      HPI:   Ms. Hawley is a 53 yo black woman with chronic heart failure with EF of 25%, HTN, COPD, anemia, pheochromocytoma, paroxysmal Afib, paroxysmal SVT, s/p ICD placement who presents today with NSTEMI. She reports worsening SOB, generalized chest tightness, and intermittent left sided chest pain for the last several days. She also reports intermittent episodes of diaphoresis, palpitations, and lightheadedness that make her feel like she will pass out. She reports PND but denies edema or syncope. She was recently hospitalized at [place] for SVT. In the ED, EKG showed NSR. Tbili was 5.6, Hgb 10.0. Troponin was 0.658, up from 0.09 baseline 10 days ago. BNP was 2573, consistent with recent hospitalizations for CHF exacerbations. CTA chest showed no PE. CXR was clear. She was admitted to hospital medicine for troponin trending and possible cardiology consult.    * No surgery found *      Hospital Course:   Troponin stable at 0.6 x5. Cardiology consulted: suspect ADHF initiated by tachyarrhythmia, and interrogated device 8/31. Interrogation revealed no tachyarrhythmia. Repeat echo showing EF 20%, grade 2 diastolic dysfunction. Continuing to diurese. Had episode of vtach and chest pain overnight 9/1. Troponin stable at 0.5 during this occurrence. Oxygen was weaned off, and home oxygen assessment showed >92% on room air during exercise. Pt was discharged home with close cardiology follow up and instruction to continue her current regimen.      Consults:   Consults (From admission, onward)        Status Ordering Provider     Inpatient  consult to [facility]  Once     Provider:  (Not yet assigned)    Completed COURTNEY ORLANDO          * Acute on chronic combined systolic and diastolic congestive heart failure    Dyspnea  Paroxysmal supraventricular tachycardia  ICD (implantable cardioverter-defibrillator) in place  Left ventricular hypertrophy  Elevated troponin  Dilated cardiomyopathy  Paroxysmal atrial fibrillation  Pt with several days of SOB, chest tightness, and lightheadedness. Troponin 0.658 on admission and stable there. Pt with history of severe combined CHF with EF 25. Recent admissions for SVT. EKG showing NSR. Interrogation of device shows no tachyarrhythmia. Repeat echo showing EF 20, grade II diastolic dysfunction. NSR on telemetry. Distended abdomen on admission, US negative for ascites.    Continue isosorbide, coreg and statin, lasix, and spironolactone. Therapeutic lovenox switched to home eliquis. Plavix. Cardiology consulted and recommend continuing current regimen. NTG PRN. F/U cardiology outpatient.        MELISSA (obstructive sleep apnea)    No home CPAP. Would likely benefit from use.        Total bilirubin, elevated    Unsure of etiology. Tbili 5.6 > 4.0 (baseline) today. Appears elevation is chronic.  Monitor outpatient.        Chronic kidney disease, stage III (moderate)    Cr 1.2 > 1.5 > 1.5 > 1.2 today.   Monitor outpatient.        COPD (chronic obstructive pulmonary disease)    No wheezing on exam today.   Duonebs and ventolin inhaler PRN.        Essential hypertension    Pheochromocytoma  -155.   Continue home coreg, lasix, losartan. Monitor.        Microcytic anemia    Hgb 10.0. Stable at baseline. No visible active bleeding.        Fibromyalgia affecting multiple sites    Major depressive disorder  No home meds. Starting lexapro for depression.          Final Active Diagnoses:    Diagnosis Date Noted POA    PRINCIPAL PROBLEM:  Acute on chronic combined systolic and diastolic congestive heart failure  [I50.43] 03/16/2018 Yes    MELISSA (obstructive sleep apnea) [G47.33] 08/23/2016 Yes     Chronic    Total bilirubin, elevated [R17] 08/30/2018 Yes    Dyspnea [R06.00] 08/30/2018 Yes    NSTEMI (non-ST elevated myocardial infarction) [I21.4] 08/30/2018 Yes    Paroxysmal supraventricular tachycardia [I47.1] 08/20/2018 Yes     Chronic    ICD (implantable cardioverter-defibrillator) in place [Z95.810] 07/24/2018 Yes     Chronic    Chronic kidney disease, stage III (moderate) [N18.3] 05/08/2018 Yes     Chronic    Left ventricular hypertrophy [I51.7]  Yes     Chronic    COPD (chronic obstructive pulmonary disease) [J44.9] 12/19/2017 Yes     Chronic    Dilated cardiomyopathy [I42.0] 10/27/2016 Yes     Chronic    Paroxysmal atrial fibrillation [I48.0] 08/25/2016 Yes     Chronic    Essential hypertension [I10] 07/22/2016 Yes     Chronic    Pheochromocytoma [D35.00] 07/22/2016 Yes     Chronic    Microcytic anemia [D50.9] 07/20/2016 Yes     Chronic    Fibromyalgia affecting multiple sites [M79.7] 06/15/2016 Yes     Chronic      Problems Resolved During this Admission:       Discharged Condition: stable    Disposition: Home or Self Care    Follow Up:  Follow-up Information     Benson Cortez MD. Call in 1 week.    Specialties:  INTERVENTIONAL CARDIOLOGY, Cardiology  Why:  patient's normal Cardiologist  Contact information:  [address]  [address]  [place] [facility] [address]  [phone]             Timbo Turk Iii, MD. Go on 9/19/2018.    Specialty:  Family Medicine  Why:  @ 1:20pm, with Dr. Neil Otero (resident)  Contact information:  [address]  [address]  [place] [address]  [phone]             Salima Cote MD On 9/12/2018.    Specialty:  Hospitalist  Why:  at 2:00 pm, [facility]  Contact information:  [address]  [address]  [place] [address]  [phone]                 Patient Instructions:      Diet Cardiac     Notify your  health care provider if you experience any of the following:  severe uncontrolled pain     Notify your health care provider if you experience any of the following:  difficulty breathing or increased cough     Activity as tolerated       Significant Diagnostic Studies: Cardiac Graphics: Echocardiogram:   2D echo with color flow doppler:   Results for orders placed or performed during the hospital encounter of 08/30/18   2D echo with color flow doppler   Result Value Ref Range    EF 20 (A) 55 - 65    Mitral Valve Regurgitation MILD     Diastolic Dysfunction Yes (A)     Est. PA Systolic Pressure 42.88 (A)     Mitral Valve Mobility NORMAL     Tricuspid Valve Regurgitation TRIVIAL        Pending Diagnostic Studies:     None         Medications:  Reconciled Home Medications:      Medication List      START taking these medications    escitalopram oxalate 5 MG Tab  Commonly known as:  LEXAPRO  Take 1 tablet (5 mg total) by mouth once daily.     isosorbide dinitrate 20 MG tablet  Commonly known as:  ISORDIL  Take 1 tablet (20 mg total) by mouth 2 (two) times daily. Please check that this is what you are already taking at home.     naphazoline-pheniramine 0.025-0.3% 0.025-0.3 % ophthalmic solution  Commonly known as:  NAPHCON-A  Place 1 drop into both eyes 4 (four) times daily. for 14 days        CONTINUE taking these medications    albuterol-ipratropium 2.5 mg-0.5 mg/3 mL nebulizer solution  Commonly known as:  DUO-NEB  Take 1 mL by nebulization every 6 (six) hours as needed for Wheezing or Shortness of Breath.     ALPRAZolam 1 MG tablet  Commonly known as:  XANAX  Take 1 mg by mouth nightly as needed for Anxiety.     aspirin 81 MG EC tablet  Commonly known as:  ECOTRIN  Take 81 mg by mouth once daily.     atorvastatin 40 MG tablet  Commonly known as:  LIPITOR  Take 1 tablet (40 mg total) by mouth once daily.     b complex vitamins tablet  Take 1 tablet by mouth once daily.     BREO ELLIPTA 100-25 mcg/dose diskus  inhaler  Generic drug:  fluticasone-vilanterol  Inhale 1 puff into the lungs once daily. Controller     carvedilol 25 MG tablet  Commonly known as:  COREG  Take 1 tablet by mouth 2 (two) times daily.     cyanocobalamin (vitamin B-12) 50 mcg tablet  Take 5 mcg by mouth once daily.     ELIQUIS 5 mg Tab  Generic drug:  apixaban  TAKE 1 TABLET BY MOUTH TWICE DAILY     furosemide 20 MG tablet  Commonly known as:  LASIX  Take 3 tablets (60 mg total) by mouth 3 (three) times daily.     losartan 25 MG tablet  Commonly known as:  COZAAR  Take 1 tablet (25 mg total) by mouth once daily.     NITROSTAT 0.4 MG SL tablet  Generic drug:  nitroGLYCERIN  Take 2.5 tablets (1 mg total) by mouth every 5 (five) minutes as needed for Chest pain. No more than 3 tablets in 15 minutes.     pantoprazole 40 MG tablet  Commonly known as:  PROTONIX  Take 1 tablet by mouth once daily.     potassium chloride 10 MEQ Tbsr  Commonly known as:  KLOR-CON  Take 1 tablet (10 mEq total) by mouth once daily.     spironolactone 25 MG tablet  Commonly known as:  ALDACTONE  Take 1 tablet (25 mg total) by mouth once daily.     VENTOLIN HFA 90 mcg/actuation inhaler  Generic drug:  albuterol  Inhale 2 puffs into the lungs 2 (two) times daily as needed.     walker Misc  1 Device by Misc.(Non-Drug; Combo Route) route as needed.            Indwelling Lines/Drains at time of discharge:   Lines/Drains/Airways          None          Time spent on the discharge of patient: 35 minutes  Patient was seen and examined on the date of discharge and determined to be suitable for discharge.         Fabienne Ferris PA-C  Department of Hospital Medicine  [facility]

## 2018-09-07 ENCOUNTER — OFFICE VISIT (OUTPATIENT)
Dept: TRANSPLANT | Facility: CLINIC | Age: 53
End: 2018-09-07
Payer: MEDICAID

## 2018-09-07 ENCOUNTER — SOCIAL WORK (OUTPATIENT)
Dept: TRANSPLANT | Facility: CLINIC | Age: 53
End: 2018-09-07

## 2018-09-07 ENCOUNTER — LAB VISIT (OUTPATIENT)
Dept: LAB | Facility: HOSPITAL | Age: 53
End: 2018-09-07
Attending: INTERNAL MEDICINE
Payer: MEDICAID

## 2018-09-07 VITALS
SYSTOLIC BLOOD PRESSURE: 134 MMHG | HEART RATE: 61 BPM | DIASTOLIC BLOOD PRESSURE: 63 MMHG | WEIGHT: 193.56 LBS | BODY MASS INDEX: 31.11 KG/M2 | HEIGHT: 66 IN

## 2018-09-07 DIAGNOSIS — I42.0 DILATED CARDIOMYOPATHY: Primary | Chronic | ICD-10-CM

## 2018-09-07 DIAGNOSIS — R17 ELEVATED BILIRUBIN: ICD-10-CM

## 2018-09-07 DIAGNOSIS — Z95.810 ICD (IMPLANTABLE CARDIOVERTER-DEFIBRILLATOR) IN PLACE: Chronic | ICD-10-CM

## 2018-09-07 DIAGNOSIS — I10 ESSENTIAL HYPERTENSION: Chronic | ICD-10-CM

## 2018-09-07 DIAGNOSIS — I48.0 PAROXYSMAL ATRIAL FIBRILLATION: Chronic | ICD-10-CM

## 2018-09-07 DIAGNOSIS — Z74.09 IMPAIRED FUNCTIONAL MOBILITY AND ACTIVITY TOLERANCE: ICD-10-CM

## 2018-09-07 DIAGNOSIS — G47.33 OSA (OBSTRUCTIVE SLEEP APNEA): Chronic | ICD-10-CM

## 2018-09-07 LAB
ALBUMIN SERPL BCP-MCNC: 3.9 G/DL
ALP SERPL-CCNC: 59 U/L
ALT SERPL W/O P-5'-P-CCNC: 10 U/L
ANION GAP SERPL CALC-SCNC: 9 MMOL/L
AST SERPL-CCNC: 17 U/L
BILIRUB SERPL-MCNC: 2.8 MG/DL
BNP SERPL-MCNC: 471 PG/ML
BUN SERPL-MCNC: 33 MG/DL
CALCIUM SERPL-MCNC: 10.5 MG/DL
CHLORIDE SERPL-SCNC: 105 MMOL/L
CO2 SERPL-SCNC: 30 MMOL/L
CREAT SERPL-MCNC: 1.5 MG/DL
EST. GFR  (AFRICAN AMERICAN): 45.5 ML/MIN/1.73 M^2
EST. GFR  (NON AFRICAN AMERICAN): 39.5 ML/MIN/1.73 M^2
GLUCOSE SERPL-MCNC: 120 MG/DL
POTASSIUM SERPL-SCNC: 4.5 MMOL/L
PROT SERPL-MCNC: 7.6 G/DL
SODIUM SERPL-SCNC: 144 MMOL/L

## 2018-09-07 PROCEDURE — 99214 OFFICE O/P EST MOD 30 MIN: CPT | Mod: S$PBB,TXP,, | Performed by: INTERNAL MEDICINE

## 2018-09-07 PROCEDURE — 80053 COMPREHEN METABOLIC PANEL: CPT | Mod: NTX

## 2018-09-07 PROCEDURE — 99213 OFFICE O/P EST LOW 20 MIN: CPT | Mod: PBBFAC,TXP | Performed by: INTERNAL MEDICINE

## 2018-09-07 PROCEDURE — 36415 COLL VENOUS BLD VENIPUNCTURE: CPT | Mod: TXP

## 2018-09-07 PROCEDURE — 83880 ASSAY OF NATRIURETIC PEPTIDE: CPT | Mod: NTX

## 2018-09-07 PROCEDURE — 99999 PR PBB SHADOW E&M-EST. PATIENT-LVL III: CPT | Mod: PBBFAC,TXP,, | Performed by: INTERNAL MEDICINE

## 2018-09-07 RX ORDER — ISOSORBIDE DINITRATE 20 MG/1
20 TABLET ORAL 3 TIMES DAILY
Qty: 90 TABLET | Refills: 11 | Status: SHIPPED | OUTPATIENT
Start: 2018-09-07 | End: 2018-09-25

## 2018-09-07 NOTE — PROGRESS NOTES
Subjective: class 3 symptoms - volume overload?   Transplant status: active    HPI:  Ms. Hawley is a 53 y.o. year old Black or  female who has presented to be evaluated as a potential heart transplant recipient (referred by Junior / Dana)   At her initial visit in July 2018, she was told to complete her pheochromocytoma. She was seen by [facility] who feel she is unlikely to have pheochromocytoma  They recommend that we can pursue heart transplant workup    Patient most recently admitted at [place] with ADHF, BNP significantly elevated at 2573, had to be diuresed, additionally had episode of VT x 1, and chest pain overnight. Had elevated troponin as well at around .6, felt to be due to ADHF and tachycardia. Bili was elevated as well, bili all the way up to 5.6, down to 2.8 today.   Not using sleep apnea machine, feels washed out quite a bit, hanging head, no energy. States she is sleeping with her mouth open. Seems depressed, tearful, states she is very down about not being able to work, has been out of a job for 3 years, states personal issues are overwhelming as well, not able to take care of her bills, also takes care of her granddaughter alone. States she is overwhelmed with all of this.     TTE 08/31/18:    1 - Severely depressed left ventricular systolic function (EF 20-25%).     2 - Impaired LV relaxation, elevated LAP (grade 2 diastolic dysfunction).     3 - Pulmonary hypertension. The estimated PA systolic pressure is 43 mmHg.     4 - Mild mitral regurgitation.     5 - Increased central venous pressure.     6 - Moderate pulmonic regurgitation.     7- LVH    Six minute walk 06/21/2018---------Distance: 292.61 meters (960 feet)     O2 Sat % Supplemental Oxygen Heart Rate Blood Pressure Haseeb   Scale   Pre-exercise  (Resting) 98 % Room Air 70 bpm 173/97 mmHg 0.5   During Exercise 95 % Room Air 80 bpm 143/100 mmHg 5-6   Post-exercise  (Recovery) 98 % Room Air  68 bpm 141/93  mmHg        Past Medical History:   Diagnosis Date    Anemia     Anticoagulant long-term use     Arthritis     Atrial fibrillation     Congestive heart failure     COPD (chronic obstructive pulmonary disease)     COPD (chronic obstructive pulmonary disease)     Encounter for blood transfusion     GERD (gastroesophageal reflux disease)     Hypertension     Pheochromocytoma, malignant     Right kidney mass     Sleep apnea     Thyroid disease      Past Surgical History:   Procedure Laterality Date    APPENDECTOMY      CARDIAC DEFIBRILLATOR PLACEMENT Left 12/2016    CARDIAC DEFIBRILLATOR PLACEMENT  2016    EYE SURGERY      due to running tears    FRACTURE SURGERY Left     hand 5th digit    HEART CATH-RIGHT Right 7/22/2016    Performed by [facility]h Garcia [facility] at [facility] CATH LAB    HYSTERECTOMY      INSERTION-ICD-SINGLE N/A 12/2/2016    [facility] at [facility] CATH LAB    KNEE SURGERY Left 2016    hematoma    PROCEDURE PROLAPSE HEMORRHOID (PPH) N/A 3/7/2017    Performed by GEORGE Rothman MD at [facility] OR    Right heart cath Right 2/6/2018    Performed by Benson Cortez MD at [facility] CATH LAB     OB History     No data available        Review of Systems   Constitution: Negative for decreased appetite, weight gain and weight loss.   Cardiovascular: Negative for chest pain, dyspnea on exertion, leg swelling, near-syncope, orthopnea and palpitations.   Respiratory: Positive for shortness of breath. Negative for cough.    Musculoskeletal: Negative for myalgias.   Gastrointestinal: Negative for jaundice.   Neurological: Positive for excessive daytime sleepiness.   Psychiatric/Behavioral: Positive for depression. The patient is nervous/anxious.         Significantly stressed due to difficulty paying her bills, taking care of grandkids       Objective:   Physical Exam   Constitutional: She appears well-developed and well-nourished. No distress.   BP (!) 144/77 (BP Location: Right arm,  Patient Position: Sitting, BP Method: Medium (Automatic))   Pulse 62   LMP  (LMP Unknown)      HENT:   Head: Normocephalic and atraumatic. Head is without abrasion and without contusion.   Right Ear: External ear normal.   Left Ear: External ear normal.   Nose: Nose normal. No epistaxis.   Mouth/Throat: Oropharynx is clear and moist. Mucous membranes are not cyanotic.   Eyes: Conjunctivae and EOM are normal. Pupils are equal, round, and reactive to light.   Neck: Normal range of motion. Neck supple. No tracheal deviation present.   Difficult to access JVP due to pulsatile thyroid area   Cardiovascular: Normal rate, regular rhythm, normal heart sounds and normal pulses. Exam reveals no gallop.   No murmur heard.  Pulmonary/Chest: Effort normal and breath sounds normal. No stridor. No respiratory distress. She has no wheezes.   Abdominal: Soft. Normal appearance, normal aorta and bowel sounds are normal. She exhibits no distension. There is no tenderness.   Musculoskeletal: She exhibits no edema or tenderness.   Neurological: She is alert. She has normal strength and normal reflexes. She exhibits normal muscle tone.   Skin: Skin is warm. No rash noted. No erythema.   Psychiatric: She has a normal mood and affect. Her speech is normal and behavior is normal. Judgment and thought content normal. Cognition and memory are normal.       Labs:    Chemistry        Component Value Date/Time     09/07/2018 1026    K 4.5 09/07/2018 1026     09/07/2018 1026    CO2 30 (H) 09/07/2018 1026    BUN 33 (H) 09/07/2018 1026    CREATININE 1.5 (H) 09/07/2018 1026     (H) 09/07/2018 1026        Component Value Date/Time    CALCIUM 10.5 09/07/2018 1026    ALKPHOS 59 09/07/2018 1026    AST 17 09/07/2018 1026    ALT 10 09/07/2018 1026    BILITOT 2.8 (H) 09/07/2018 1026    ESTGFRAFRICA 45.5 (A) 09/07/2018 1026    EGFRNONAA 39.5 (A) 09/07/2018 1026          Magnesium   Date Value Ref Range Status   09/03/2018 1.9 1.6 - 2.6  mg/dL Final     Lab Results   Component Value Date    WBC 5.99 08/30/2018    HGB 10.0 (L) 08/30/2018    HCT 33.8 (L) 08/30/2018     08/30/2018     Lab Results   Component Value Date    INR 1.1 08/20/2018    INR 1.2 08/01/2018    INR 1.1 06/11/2018     BNP   Date Value Ref Range Status   09/07/2018 471 (H) 0 - 99 pg/mL Final     Comment:     Values of less than 100 pg/ml are consistent with non-CHF populations.   08/30/2018 2,573 (H) 0 - 99 pg/mL Final     Comment:     Values of less than 100 pg/ml are consistent with non-CHF populations.   08/13/2018 647 (H) 0 - 99 pg/mL Final     Comment:     Values of less than 100 pg/ml are consistent with non-CHF populations.     LD   Date Value Ref Range Status   06/11/2018 308 (H) 110 - 260 U/L Final     Comment:     Results are increased in hemolyzed samples.   08/23/2016 280 (H) 110 - 260 U/L Final     Comment:     Results are increased in hemolyzed samples.       Assessment:      1. Dilated cardiomyopathy    2. Essential hypertension    3. ICD (implantable cardioverter-defibrillator) in place    4. Paroxysmal atrial fibrillation    5. MELISSA (obstructive sleep apnea)    6. Impaired functional mobility and activity tolerance        Plan:      elevated bilirubin- needs to see hepatology still, will make sure this is set up again. Agree with likely will need biopsy  Pheochromocytoma- patient's notes from most recent endocrine eval states they do not think they have it  Possible infiltrative cardiomyopathy- Dr. Rodriguez discussed possible heart biopsy however do not see it is arranged. Will hold off on this for now.  HTN/DCM- increase isoril to 20mg TID.   Psychosocial stressors- patient is under significant psychosocial stressors that really push her, causing her major personal issues, relationship stressors. She is going to meet with Aleah Friend after our visit today.     Patient is now NYHA III  Recommend 2 gram sodium restriction and 1500cc fluid restriction.  Encourage  physical activity with graded exercise program.  Requested patient to weigh themselves daily, and to notify us if their weight increases by more than 3 lbs in 1 day or 5 lbs in 1 week.     Listed for transplant: No     Transplant candidacy:  Patient is a 53 y.o. year old female with heart failure is being seen for possible LVAD and OHT. In my opinion she is a marginal candidate (See items listed above) she is scheduled for medical optimization prior to risk stratification. The patient will follow up with pre-transplant.

## 2018-09-07 NOTE — LETTER
September 11, 2018        Andrea Pacheco  [address]  [address]  [place] [place] [facility] [address]  Phone: [phone]  Fax: [phone]             [facility] [address] [place] [address]  Phone: [phone]   Patient: Hafsa Hawley   MR Number: [id number]   YOB: 1965   Date of Visit: 9/7/2018       Dear Dr. Andrea Pacheco    Thank you for referring Hafsa Hawley to me for evaluation. Attached you will find relevant portions of my assessment and plan of care.    If you have questions, please do not hesitate to call me. I look forward to following Hafsa Hawley along with you.    Sincerely,    Bertha Lorenzo MD    Enclosure    If you would like to receive this communication electronically, please contact [email or web address] or [phone] to request [employer] access.    [employer] is a tool which provides read-only access to select patient information with whom you have a relationship. Its easy to use and provides real time access to review your patients record including encounter summaries, notes, results, and demographic information.    If you feel you have received this communication in error or would no longer like to receive these types of communications, please e-mail [email or web address]

## 2018-09-10 ENCOUNTER — TELEPHONE (OUTPATIENT)
Dept: PRIMARY CARE CLINIC | Facility: CLINIC | Age: 53
End: 2018-09-10

## 2018-09-12 ENCOUNTER — OFFICE VISIT (OUTPATIENT)
Dept: PRIMARY CARE CLINIC | Facility: CLINIC | Age: 53
End: 2018-09-12
Payer: MEDICAID

## 2018-09-12 ENCOUNTER — LAB VISIT (OUTPATIENT)
Dept: LAB | Facility: HOSPITAL | Age: 53
End: 2018-09-12
Attending: INTERNAL MEDICINE
Payer: MEDICAID

## 2018-09-12 VITALS
SYSTOLIC BLOOD PRESSURE: 113 MMHG | WEIGHT: 191.94 LBS | HEART RATE: 66 BPM | OXYGEN SATURATION: 97 % | TEMPERATURE: 98 F | DIASTOLIC BLOOD PRESSURE: 73 MMHG | HEIGHT: 66 IN | BODY MASS INDEX: 30.85 KG/M2

## 2018-09-12 DIAGNOSIS — R17 TOTAL BILIRUBIN, ELEVATED: ICD-10-CM

## 2018-09-12 DIAGNOSIS — I48.0 PAROXYSMAL ATRIAL FIBRILLATION: Chronic | ICD-10-CM

## 2018-09-12 DIAGNOSIS — B36.9 FUNGAL DERMATITIS: ICD-10-CM

## 2018-09-12 DIAGNOSIS — I21.4 NSTEMI (NON-ST ELEVATED MYOCARDIAL INFARCTION): Primary | ICD-10-CM

## 2018-09-12 DIAGNOSIS — Z95.810 ICD (IMPLANTABLE CARDIOVERTER-DEFIBRILLATOR) IN PLACE: Chronic | ICD-10-CM

## 2018-09-12 DIAGNOSIS — I47.10 PAROXYSMAL SUPRAVENTRICULAR TACHYCARDIA: Chronic | ICD-10-CM

## 2018-09-12 DIAGNOSIS — I50.43 ACUTE ON CHRONIC COMBINED SYSTOLIC AND DIASTOLIC CONGESTIVE HEART FAILURE: ICD-10-CM

## 2018-09-12 PROBLEM — R10.9 ABDOMINAL PAIN: Status: RESOLVED | Noted: 2017-12-19 | Resolved: 2018-09-12

## 2018-09-12 LAB
ALBUMIN SERPL BCP-MCNC: 3.9 G/DL
ALP SERPL-CCNC: 62 U/L
ALT SERPL W/O P-5'-P-CCNC: 14 U/L
AST SERPL-CCNC: 18 U/L
BILIRUB DIRECT SERPL-MCNC: 0.7 MG/DL
BILIRUB SERPL-MCNC: 2.5 MG/DL
PROT SERPL-MCNC: 7.3 G/DL

## 2018-09-12 PROCEDURE — 36415 COLL VENOUS BLD VENIPUNCTURE: CPT | Mod: TXP

## 2018-09-12 PROCEDURE — 99205 OFFICE O/P NEW HI 60 MIN: CPT | Mod: S$PBB,,, | Performed by: INTERNAL MEDICINE

## 2018-09-12 PROCEDURE — 99213 OFFICE O/P EST LOW 20 MIN: CPT | Mod: PBBFAC,PO | Performed by: INTERNAL MEDICINE

## 2018-09-12 PROCEDURE — 99999 PR PBB SHADOW E&M-EST. PATIENT-LVL III: CPT | Mod: PBBFAC,,, | Performed by: INTERNAL MEDICINE

## 2018-09-12 PROCEDURE — 80076 HEPATIC FUNCTION PANEL: CPT | Mod: TXP

## 2018-09-12 RX ORDER — NYSTATIN 100000 [USP'U]/G
POWDER TOPICAL 2 TIMES DAILY
Qty: 30 G | Refills: 1 | Status: SHIPPED | OUTPATIENT
Start: 2018-09-12 | End: 2018-10-23

## 2018-09-12 NOTE — PROGRESS NOTES
[facility]  New Visit Progress Note   Recent Hospital Discharge     PRESENTING HISTORY     Chief Complaint/Reason for Admission:  Follow up Hospital Discharge     PCP: ANKIT Hatch MD    History of Present Illness:  Ms. Hafsa Hawley is a 53 y.o. female who was recently admitted to the hospital.    Patient Name: Hafsa Hawley  MRN: [id number]  Admission Date: 8/30/2018  Hospital Length of Stay: 3 days  Discharge Date and Time: No discharge date for patient encounter.  Attending Physician: Benji Rosas MD   Discharging Provider: Fabienne Ferris PA-C  Primary Care Provider: ANKIT Hatch MD  __________________________________________________________________    Today:  [facility].  Recently hospitalized for NSTEMI and decompensated heart failure.    Device interrogated on 8/31/18 and no tachyarrhythmia identified.   Subsequently had episode of non sustained V Tach, accompanied by chest pain, overnight on 9/1/18.   Repeat 2 D echo while hospitalized revealed EF 20% and grade 2 diastolic dysfunction.  She diuresed effectively and symptoms improved.  Medical management was maximized.  She was discharged to home with plans for HTS follow up.     Patient is unaccompanied today.  She is ambulatory and independent with ADL's.  She manages her own medication and reports compliance.  Since hospital discharge she has been seen by Dr Lorenzo with HTS at [facility].  Isosorbide frequency was increased to three times daily.  Patient would like to have further HTS follow up at [facility] if possible.     She continues to have left sided chest pain similar to the pain which prompted her recent admission.  No dyspnea, diaphoresis, syncope, LE swelling, or orthopnea.     Review of Systems  General ROS: negative for chills, fever or weight loss  Psychological ROS: negative for hallucination, depression or suicidal ideation  Ophthalmic ROS: negative for blurry  vision, photophobia or eye pain  ENT ROS: negative for epistaxis, sore throat or rhinorrhea  Respiratory ROS: no cough, shortness of breath, or wheezing  Cardiovascular ROS: + intermittent left sided chest pain - no dyspnea on exertion  Gastrointestinal ROS: no abdominal pain, change in bowel habits, or black/ bloody stools  Genito-Urinary ROS: no dysuria, trouble voiding, or hematuria  Musculoskeletal ROS: negative for gait disturbance or muscular weakness  Neurological ROS: no syncope or seizures; no ataxia  Dermatological ROS: + diffuse pruritis + rash       PAST HISTORY:     Past Medical History:   Diagnosis Date    Anemia     Anticoagulant long-term use     Arthritis     Atrial fibrillation     Congestive heart failure     COPD (chronic obstructive pulmonary disease)     COPD (chronic obstructive pulmonary disease)     Encounter for blood transfusion     GERD (gastroesophageal reflux disease)     Hypertension     Pheochromocytoma, malignant     Right kidney mass     Sleep apnea     Thyroid disease        Past Surgical History:   Procedure Laterality Date    APPENDECTOMY      CARDIAC DEFIBRILLATOR PLACEMENT Left 2016    CARDIAC DEFIBRILLATOR PLACEMENT  2016    EYE SURGERY      due to running tears    FRACTURE SURGERY Left     hand 5th digit    HEART CATH-RIGHT Right 2016    [facility]h [facility] at [facility] CATH LAB    HYSTERECTOMY      INSERTION-ICD-SINGLE N/A 2016    [facility] CATH LAB    KNEE SURGERY Left 2016    hematoma    PROCEDURE PROLAPSE HEMORRHOID (PPH) N/A 3/7/2017    Performed by GEORGE Rothman MD at [facility] OR    Right heart cath Right 2018    Performed by Benson Cortez MD at [facility] CATH LAB       Family History   Problem Relation Age of Onset    Cancer Mother         pancreatic CA early 50's    Heart disease Father          MI in late 50's    Hypertension Father     Heart disease Sister     Heart disease  Brother     Heart disease Sister     Heart disease Brother     Hypertension Brother     Diabetes Brother        Social History     Socioeconomic History    Marital status:      Spouse name: None    Number of children: None    Years of education: None    Highest education level: None   Social Needs    Financial resource strain: None    Food insecurity - worry: None    Food insecurity - inability: None    Transportation needs - medical: None    Transportation needs - non-medical: None   Occupational History    None   Tobacco Use    Smoking status: Never Smoker    Smokeless tobacco: Never Used   Substance and Sexual Activity    Alcohol use: No     Comment: up to 1 yr ago drank 2-3 drinks on occasion but sporadic    Drug use: No    Sexual activity: Yes     Partners: Male   Other Topics Concern    None   Social History Narrative    On disability since 2013       MEDICATIONS & ALLERGIES:     Current Outpatient Medications on File Prior to Visit   Medication Sig Dispense Refill    albuterol-ipratropium 2.5mg-0.5mg/3mL (DUO-NEB) 0.5 mg-3 mg(2.5 mg base)/3 mL nebulizer solution Take 1 mL by nebulization every 6 (six) hours as needed for Wheezing or Shortness of Breath. 1 Box 2    ALPRAZolam (XANAX) 1 MG tablet Take 1 mg by mouth nightly as needed for Anxiety.      aspirin (ECOTRIN) 81 MG EC tablet Take 81 mg by mouth once daily.      atorvastatin (LIPITOR) 40 MG tablet Take 1 tablet (40 mg total) by mouth once daily. 90 tablet 3    b complex vitamins tablet Take 1 tablet by mouth once daily.      carvedilol (COREG) 25 MG tablet Take 1 tablet by mouth 2 (two) times daily.      cyanocobalamin, vitamin B-12, 50 mcg tablet Take 5 mcg by mouth once daily.      ELIQUIS 5 mg Tab TAKE 1 TABLET BY MOUTH TWICE DAILY 60 tablet 0    fluticasone-vilanterol (BREO ELLIPTA) 100-25 mcg/dose diskus inhaler Inhale 1 puff into the lungs once daily. Controller      furosemide (LASIX) 20 MG tablet Take 3  "tablets (60 mg total) by mouth 3 (three) times daily. 270 tablet 2    isosorbide dinitrate (ISORDIL) 20 MG tablet Take 1 tablet (20 mg total) by mouth 3 (three) times daily. Please check that this is what you are already taking at home. 90 tablet 11    losartan (COZAAR) 25 MG tablet Take 1 tablet (25 mg total) by mouth once daily. 90 tablet 3    naphazoline-pheniramine 0.025-0.3% (NAPHCON-A) 0.025-0.3 % ophthalmic solution Place 1 drop into both eyes 4 (four) times daily. for 14 days  0    NITROSTAT 0.4 mg SL tablet Take 2.5 tablets (1 mg total) by mouth every 5 (five) minutes as needed for Chest pain. No more than 3 tablets in 15 minutes.      pantoprazole (PROTONIX) 40 MG tablet Take 1 tablet by mouth once daily.  5    potassium chloride (KLOR-CON) 10 MEQ TbSR Take 1 tablet (10 mEq total) by mouth once daily.      spironolactone (ALDACTONE) 25 MG tablet Take 1 tablet (25 mg total) by mouth once daily. 30 tablet 1    VENTOLIN HFA 90 mcg/actuation inhaler Inhale 2 puffs into the lungs 2 (two) times daily as needed.   11    walker Misc 1 Device by Misc.(Non-Drug; Combo Route) route as needed.  0     No current facility-administered medications on file prior to visit.         Review of patient's allergies indicates:   Allergen Reactions    Penicillins Hives    Percocet [oxycodone-acetaminophen] Other (See Comments)     Nausea, Dizziness, Anxiety.  "I don't like how it makes me feel."   Given Hydromorphone 0.5mg IVP  Without problems.    Diovan hct [valsartan-hydrochlorothiazide] Other (See Comments)     Causes coughing    Tramadol Nausea And Vomiting       OBJECTIVE:     Vital Signs:  Vitals:    09/12/18 1343   BP: 113/73   Pulse: 66   Temp: 98 °F (36.7 °C)     Wt Readings from Last 1 Encounters:   09/12/18 1343 87.1 kg (191 lb 14.6 oz)     Body mass index is 30.98 kg/m².   97%    Physical Exam:  /73 (BP Location: Right arm, Patient Position: Sitting, BP Method: X-Large (Automatic))   Pulse 66   " "Temp 98 °F (36.7 °C) (Oral)   Ht 5' 6" (1.676 m)   Wt 87.1 kg (191 lb 14.6 oz)   LMP  (LMP Unknown)   SpO2 97%   BMI 30.98 kg/m²   General appearance: alert, cooperative, no distress  Constitutional:Oriented to person, place, and time  + appears well-developed and well-nourished.  HEENT: Normocephalic, atraumatic, neck symmetrical, no nasal discharge   Eyes: + jaundice sclera , PERRL, EOM's intact  Lungs: clear to auscultation bilaterally, no dullness to percussion bilaterally  Heart: regular rate and rhythm without rub; no displacement of the PMI   Abdomen: soft, non-tender; bowel sounds normoactive; no organomegaly  Extremities: extremities symmetric; no clubbing, cyanosis, or edema  Integument: Skin color, texture, turgor normal; hair distrubution normal  + rash under breasts and in folds of groin   Neurologic: Alert and oriented X 3, normal strength, normal coordination and gait  Psychiatric: no pressured speech; normal affect; no evidence of impaired cognition     Laboratory  Lab Results   Component Value Date    WBC 5.99 08/30/2018    HGB 10.0 (L) 08/30/2018    HCT 33.8 (L) 08/30/2018    MCV 57 (L) 08/30/2018     08/30/2018     BMP  Lab Results   Component Value Date     09/07/2018    K 4.5 09/07/2018     09/07/2018    CO2 30 (H) 09/07/2018    BUN 33 (H) 09/07/2018    CREATININE 1.5 (H) 09/07/2018    CALCIUM 10.5 09/07/2018    ANIONGAP 9 09/07/2018    ESTGFRAFRICA 45.5 (A) 09/07/2018    EGFRNONAA 39.5 (A) 09/07/2018     Lab Results   Component Value Date    ALT 14 09/12/2018    AST 18 09/12/2018    ALKPHOS 62 09/12/2018    BILITOT 2.5 (H) 09/12/2018     Lab Results   Component Value Date    INR 1.1 08/20/2018    INR 1.2 08/01/2018    INR 1.1 06/11/2018     Lab Results   Component Value Date    HGBA1C 5.1 05/10/2018     Results for OMERO WASHBURNE (MRN [id number]) as of 9/13/2018 13:13   Ref. Range 9/12/2018 15:02   Alkaline Phosphatase Latest Ref Range: 55 - 135 U/L 62   Total Protein " Latest Ref Range: 6.0 - 8.4 g/dL 7.3   Albumin Latest Ref Range: 3.5 - 5.2 g/dL 3.9   Total Bilirubin Latest Ref Range: 0.1 - 1.0 mg/dL 2.5 (H)   Bilirubin, Direct Latest Ref Range: 0.1 - 0.3 mg/dL 0.7 (H)   AST Latest Ref Range: 10 - 40 U/L 18   ALT Latest Ref Range: 10 - 44 U/L 14       Diagnostic Results:  2 D Echo: 8/31/18  CONCLUSIONS     1 - Severely depressed left ventricular systolic function (EF 20-25%).     2 - Impaired LV relaxation, elevated LAP (grade 2 diastolic dysfunction).     3 - Pulmonary hypertension. The estimated PA systolic pressure is 43 mmHg.     4 - Mild mitral regurgitation.     5 - Increased central venous pressure.     6 - Moderate pulmonic regurgitation.     7- LVH    ASSESSMENT & PLAN:     NSTEMI (non-ST elevated myocardial infarction)  Acute on chronic combined systolic and diastolic congestive heart failure  - recent hospitalization for NSTEMI and decompensated heart failure  - responded well to diuresis  - medical management maximized and patient discharged with [facility] follow up  - seen by HTS service- Dr Lorenzo- on 9/7/18; patient would like to be seen by Dr Rodriguez at the [facility] if possible- will try to arrange this     Paroxysmal atrial fibrillation  Paroxysmal supraventricular tachycardia  ICD (implantable cardioverter-defibrillator) in place  - Device interrogated on 8/31/18 while hospitalized and no tachyarrhythmia identified    Total bilirubin, elevated  - etiology unclear, bilirubin trending down but still elevated  - has upcoming hepatology evaluation   -     Hepatic function panel; Future; Expected date: 09/12/2018    Fungal dermatitis  -     nystatin (MYCOSTATIN) powder; Apply topically 2 (two) times daily.  Dispense: 30 g; Refill: 1      Patient will be released from [facility].  Has appt to establish new Primary Care 9/17/18.   Instructions for the patient:      Scheduled Follow-up :  Future Appointments   Date Time Provider Department Center   9/17/2018 10:20 AM  Aster Paz,  [facility]   9/21/2018 10:30 AM Beau Rodriguez MD [facility] HEARTTX [facility]   9/21/2018  2:00 PM LAB, APPOINTMENT [facility] LAB V[facility]   9/21/2018  3:00 PM Laura Lindo NP [facility] HEPAT [address]   10/29/2018  8:00 AM HOME MONITOR DEVICE CHECK, [facility] ARRHYTH [address]   1/28/2019  8:00 AM HOME MONITOR DEVICE CHECK, [facility] AR[address]HYTH [address]   6/28/2019 10:00 AM [facility] CT1 GE VCT64 SLICE CT LIMIT 450 LBS [facility] CT SCAN [facility]   7/1/2019 10:30 AM Missael Landry MD [facility] TUMOR [facility]       Post Visit Medication List:     Medication List           Accurate as of 9/12/18 11:59 PM. If you have any questions, ask your nurse or doctor.               START taking these medications    NYAMYC powder  Generic drug:  nystatin  Apply topically 2 (two) times daily.  Started by:  aSlima Cote MD        CONTINUE taking these medications    albuterol-ipratropium 2.5 mg-0.5 mg/3 mL nebulizer solution  Commonly known as:  DUO-NEB  Take 1 mL by nebulization every 6 (six) hours as needed for Wheezing or Shortness of Breath.     ALPRAZolam 1 MG tablet  Commonly known as:  XANAX     aspirin 81 MG EC tablet  Commonly known as:  ECOTRIN     atorvastatin 40 MG tablet  Commonly known as:  LIPITOR  Take 1 tablet (40 mg total) by mouth once daily.     b complex vitamins tablet     BREO ELLIPTA 100-25 mcg/dose diskus inhaler  Generic drug:  fluticasone-vilanterol     carvedilol 25 MG tablet  Commonly known as:  COREG     cyanocobalamin (vitamin B-12) 50 mcg tablet     ELIQUIS 5 mg Tab  Generic drug:  apixaban  TAKE 1 TABLET BY MOUTH TWICE DAILY     furosemide 20 MG tablet  Commonly known as:  LASIX  Take 3 tablets (60 mg total) by mouth 3 (three) times daily.     isosorbide dinitrate 20 MG tablet  Commonly known as:  ISORDIL  Take 1 tablet (20 mg total) by mouth 3 (three) times daily. Please check that this is what you are already taking at home.     losartan 25 MG  tablet  Commonly known as:  COZAAR  Take 1 tablet (25 mg total) by mouth once daily.     naphazoline-pheniramine 0.025-0.3% 0.025-0.3 % ophthalmic solution  Commonly known as:  NAPHCON-A  Place 1 drop into both eyes 4 (four) times daily. for 14 days     NITROSTAT 0.4 MG SL tablet  Generic drug:  nitroGLYCERIN  Take 2.5 tablets (1 mg total) by mouth every 5 (five) minutes as needed for Chest pain. No more than 3 tablets in 15 minutes.     pantoprazole 40 MG tablet  Commonly known as:  PROTONIX     potassium chloride 10 MEQ Tbsr  Commonly known as:  KLOR-CON  Take 1 tablet (10 mEq total) by mouth once daily.     spironolactone 25 MG tablet  Commonly known as:  ALDACTONE  Take 1 tablet (25 mg total) by mouth once daily.     VENTOLIN HFA 90 mcg/actuation inhaler  Generic drug:  albuterol     walker Misc  1 Device by Misc.(Non-Drug; Combo Route) route as needed.           Where to Get Your Medications      These medications were sent to [facility] [address] [place] [address]    Hours:  Mon-Fri, 8a-5:30p Phone:  [phone]   · [facility] powder         Signing Physician:  Salima Cote MD

## 2018-09-14 NOTE — PROGRESS NOTES
"SW met with pt in clinic at the request of Dr. Lorenzo. Pt aaox4, calm, and tearful at times. Pt reports feeling depressed due to medical condition and difficulty with finances. Pt reports making $750 a month in disability and $352 in food stamps. Pt reports she is currently raising her 12 year old granddtr, and does not receive any income to assist with granddtrs expenses because she is not the legal guardian of her granddtr. Pt reports her son (granddtr's father) is addicted to drugs and cannot assist with financial or emotional support. Pt reports granddtr's mother is 28 years old with a total of 9 children, and is unable to assist with financial or emotional support. Pt states she is trying to get custody of her granddtr but it will cost about $500 to complete the legal process, and pt cannot afford this. Pt receives section 8 housing, and states her granddtr will be taken off of pt's lease if pt is not able to obtain custody of her granddtr. Pt reports she also has difficulty obtaining medical care for her granddtr and checking her out of school because pt is not the legal guardian.     Pt does have an adult dtr, however pt states her dtr is working full time while raising 3 children, and is unable to offer any financial support to pt.      Pt reports she has 2 payday loans totaling $700 and is $404 behind on her rent. Pt reports landlord has been lenient with pt so far, but pt is worried about catching up on bills. 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 Pt reports requesting assistance from [facility], and they did not have any funds to provide assistance.     Pt reports difficulty with transportation. Pt states she has a car, but cannot drive because she cannot afford insurance on the car. Pt reports she has to pay friends to assist with transportation. SW provided information on Medicaid " transport, and explained pt can use this service for medical appointments.     Pt reports she has been socially isolating herself lately by avoiding phone calls and pretending not to be home when friends and family visit. Pt reports pt's sister brings pt's granddtr to [employer] every Sunday, and invites pt to join, however pt has not been joining. SW providing extensive emotional support and counseling. SW encouraging pt to begin to engage in activities with others, either going to [employer] with sister, answering phone when friends call, or allowing friends and family to visit. Pt reports belief that engaging with other will help lessen the symptoms of depression. Pt [facility] [place], but has not made an appointment yet. SW encouraging pt to make an appointment with a counselor at the [facility] soon.     TONYA is researching resources in pt's area, however no assistance programs have been identified yet. SW continuing to look into assistance programs or grants available to pt. SW providing psychosocial and counseling support, education, assistance, and resources as indicated. SW remains available.

## 2018-09-17 ENCOUNTER — OFFICE VISIT (OUTPATIENT)
Dept: FAMILY MEDICINE | Facility: HOSPITAL | Age: 53
End: 2018-09-17
Attending: FAMILY MEDICINE
Payer: MEDICAID

## 2018-09-17 VITALS — DIASTOLIC BLOOD PRESSURE: 84 MMHG | SYSTOLIC BLOOD PRESSURE: 134 MMHG | HEART RATE: 60 BPM

## 2018-09-17 DIAGNOSIS — J44.9 CHRONIC OBSTRUCTIVE PULMONARY DISEASE, UNSPECIFIED COPD TYPE: Primary | ICD-10-CM

## 2018-09-17 DIAGNOSIS — M79.2 NEUROPATHIC PAIN: ICD-10-CM

## 2018-09-17 DIAGNOSIS — G47.33 OSA (OBSTRUCTIVE SLEEP APNEA): ICD-10-CM

## 2018-09-17 DIAGNOSIS — M79.7 FIBROMYALGIA: ICD-10-CM

## 2018-09-17 PROBLEM — K64.5 INTERNAL THROMBOSED HEMORRHOIDS: Status: RESOLVED | Noted: 2017-03-07 | Resolved: 2018-09-17

## 2018-09-17 PROCEDURE — 99214 OFFICE O/P EST MOD 30 MIN: CPT | Performed by: STUDENT IN AN ORGANIZED HEALTH CARE EDUCATION/TRAINING PROGRAM

## 2018-09-17 RX ORDER — AMITRIPTYLINE HYDROCHLORIDE 25 MG/1
25 TABLET, FILM COATED ORAL NIGHTLY PRN
Qty: 30 TABLET | Refills: 3 | Status: SHIPPED | OUTPATIENT
Start: 2018-09-17 | End: 2018-09-17

## 2018-09-17 RX ORDER — AMITRIPTYLINE HYDROCHLORIDE 25 MG/1
25 TABLET, FILM COATED ORAL NIGHTLY
Qty: 30 TABLET | Refills: 11 | Status: SHIPPED | OUTPATIENT
Start: 2018-09-17 | End: 2018-10-23

## 2018-09-17 NOTE — PROGRESS NOTES
Subjective:       Patient ID: Hafsa Hawley is a 53 y.o. female.    Chief Complaint: COPD management    HPI   54 yo AAF with multiple medical problems and recent hospitalization (-) for NSTEMI and per cards thought 2/2 SVT and demand presents to clinic today to establish care and for COPD management.  Pt states that she has been compliant with all recommendations since discharge from hospital. She has been trying to exercise daily and eat a diet with more fruits/veggies. She has been feeling overall improved but still reports cough and mild sob at night that makes it hard to use her cpap. She would like to be fitted for a new mask.   She reports diffuse pains through out body worse at night and when trying to sleep. Pain is pins/needles in arms and hands. Restless leg type pain in bilateral legs. Low back pain described as aching.     Previously seen by Dr. Kirk (sp?) in [place].     PMH  - COPD (pft 2015 no signs of obstruction)   - P Afib s/p ICD (Dr. Michael bennett [facility] [place])  - HFrEF (last Echo 2018: EF 20-25%, DD grade 2 on Heart transplant list)  - pHTN ( PA pressure 43)  - fibromyalgia  - MELISSA - sometimes uses cpap but wants new mask  - CKD 3  - pheochromocytoma  - adrenal mass (not surgical candidate)   - microcystic anemia, hx of sickle cell trait and thalasemia (mcv 57 h/h    - lumbar disc disease       PSH  -hysterectomy and salpingectomy  -colonoscopy  -appy  - hemroird surgery  - cholecystectomy    SH  - etoh: denies  - tobacco: never  - illicit: never  - diet: veggies, fruits, avoiding salt  - exercise: sit and fit daily   - work: not working,   - lives with son and granddaughter (12)     FM  - Dad ( MI)  - siblings DM, HTN    Allergy   - PCN (hives, swelling)   - percocet/tramadol (n/v)   - ace (cough)    Medications  - duo-nebs   - breo  - albuterol  - coreg  - imdur  - elquis  - spironolactone  - lasix  - klor-con  - protonix  - lipitor  - xanax  - losartan  - b12    Other  physician;  Cardiology, Dr. Rodriguez  Neurology, Dr Roe in past   GI: Dr. Rothman (retired)     Review of Systems   Constitutional: Positive for fatigue (weak, drained). Negative for chills and fever.   Respiratory: Negative for chest tightness, shortness of breath and wheezing.    Cardiovascular: Positive for palpitations. Negative for chest pain and leg swelling.   Gastrointestinal: Negative for abdominal pain, constipation, diarrhea, nausea and vomiting.   Musculoskeletal: Negative for arthralgias and myalgias.   Skin: Negative for rash.   Neurological: Positive for headaches (occasional ). Negative for dizziness and weakness.   Psychiatric/Behavioral: Negative for behavioral problems. The patient is not nervous/anxious.        Objective:      Vitals:    09/17/18 2017   BP: 134/84   Pulse: 60     Physical Exam   Constitutional: She is oriented to person, place, and time. She appears well-developed and well-nourished.   HENT:   Head: Normocephalic and atraumatic.   Eyes: Conjunctivae and EOM are normal. Pupils are equal, round, and reactive to light.   Neck: Normal range of motion. Neck supple. No thyromegaly present.   Cardiovascular: Normal rate, regular rhythm and normal heart sounds. Exam reveals no gallop and no friction rub.   No murmur heard.  Pulmonary/Chest: Effort normal and breath sounds normal. She has no wheezes. She has no rales.   Abdominal: Soft. Bowel sounds are normal. She exhibits no distension. There is no tenderness.   Musculoskeletal: Normal range of motion.   Neurological: She is alert and oriented to person, place, and time. She has normal reflexes.   Skin: Skin is warm and dry.   Psychiatric: She has a normal mood and affect. Her behavior is normal.   Nursing note and vitals reviewed.      Assessment:       1. Chronic obstructive pulmonary disease, unspecified COPD type    2. MELISSA (obstructive sleep apnea)    3. Neuropathic pain    4. Fibromyalgia        Plan:       Chronic obstructive  pulmonary disease, unspecified COPD type  -     Complete PFT with bronchodilator; Future  -     PULSE OXIMETRY WITH REST - PULM; Future    MELISSA (obstructive sleep apnea)  -     Polysomnogram (CPAP will be added if patient meets diagnostic criteria.); Future    Fibromyalgia  -     Ambulatory referral to Rheumatology    Neuropathic pain  - elavil 25 mg qhs     RTC in 1 month    Aster Paz D.O.  [facility] HO-3  09/17/2018

## 2018-09-21 ENCOUNTER — OFFICE VISIT (OUTPATIENT)
Dept: HEPATOLOGY | Facility: CLINIC | Age: 53
End: 2018-09-21
Payer: MEDICAID

## 2018-09-21 ENCOUNTER — TELEPHONE (OUTPATIENT)
Dept: SLEEP MEDICINE | Facility: OTHER | Age: 53
End: 2018-09-21

## 2018-09-21 VITALS
DIASTOLIC BLOOD PRESSURE: 71 MMHG | HEART RATE: 72 BPM | OXYGEN SATURATION: 96 % | RESPIRATION RATE: 18 BRPM | SYSTOLIC BLOOD PRESSURE: 138 MMHG | HEIGHT: 66 IN | WEIGHT: 198.63 LBS | BODY MASS INDEX: 31.92 KG/M2 | TEMPERATURE: 98 F

## 2018-09-21 DIAGNOSIS — R10.10 UPPER ABDOMINAL PAIN: ICD-10-CM

## 2018-09-21 DIAGNOSIS — I50.43 ACUTE ON CHRONIC COMBINED SYSTOLIC AND DIASTOLIC CONGESTIVE HEART FAILURE: ICD-10-CM

## 2018-09-21 DIAGNOSIS — K62.5 RECTAL BLEEDING: ICD-10-CM

## 2018-09-21 DIAGNOSIS — I48.0 PAROXYSMAL ATRIAL FIBRILLATION: Chronic | ICD-10-CM

## 2018-09-21 DIAGNOSIS — R19.5 CHANGE IN STOOL CALIBER: ICD-10-CM

## 2018-09-21 DIAGNOSIS — Z95.810 ICD (IMPLANTABLE CARDIOVERTER-DEFIBRILLATOR) IN PLACE: Chronic | ICD-10-CM

## 2018-09-21 DIAGNOSIS — D50.9 MICROCYTIC ANEMIA: ICD-10-CM

## 2018-09-21 DIAGNOSIS — R17 SERUM TOTAL BILIRUBIN ELEVATED: Primary | ICD-10-CM

## 2018-09-21 DIAGNOSIS — R70.1 RETICULOCYTOSIS: ICD-10-CM

## 2018-09-21 PROCEDURE — 99204 OFFICE O/P NEW MOD 45 MIN: CPT | Mod: S$PBB,NTX,, | Performed by: NURSE PRACTITIONER

## 2018-09-21 PROCEDURE — 99999 PR PBB SHADOW E&M-EST. PATIENT-LVL V: CPT | Mod: PBBFAC,TXP,, | Performed by: NURSE PRACTITIONER

## 2018-09-21 PROCEDURE — 99215 OFFICE O/P EST HI 40 MIN: CPT | Mod: PBBFAC,TXP | Performed by: NURSE PRACTITIONER

## 2018-09-21 NOTE — PROGRESS NOTES
I have personally performed a face to face diagnostic evaluation on this patient. I have reviewed and agree with today's findings and the care plan outlined by Laura Lindo NP      My findings are as follows:    Patient presents with elevated bilirubin, indirect bili > direct bili.  referred by heart transplant. May have LVAD/transplant.  Could be from congestion and/or hemolysis.      Get U/s elastography.       she will return to Laura Lindo NP for follow-up.

## 2018-09-21 NOTE — LETTER
September 21, 2018      Beau Rodriguez MD  [address] [facility] [address]           [facility] - Hepatology  [address]  [place] [address]  Phone: [phone]  Fax: [phone]          Patient: Hafsa Hawley   MR Number: [id number]   YOB: 1965   Date of Visit: 9/21/2018       Dear Dr. Beau Rodriguez:    Thank you for referring Hafsa Hawley to me for evaluation. Attached you will find relevant portions of my assessment and plan of care.    If you have questions, please do not hesitate to call me. I look forward to following Hafsa Hawley along with you.    Sincerely,    Laura Lindo, LITA    Enclosure  CC:  No Recipients    If you would like to receive this communication electronically, please contact [email or web address] or [phone] to request more information on [employer] access.    For providers and/or their staff who would like to refer a patient to [facility], please contact us through our one-stop-shop provider referral line, [facility], at [phone].    If you feel you have received this communication in error or would no longer like to receive these types of communications, please e-mail [email or web address]

## 2018-09-21 NOTE — PROGRESS NOTES
[facility] VISIT NEW PT NOTE    REFERRING PROVIDER: Dr. Beau Rodriguez    CHIEF COMPLAINT: elevated bilirubin    HPI: This is a 53 y.o. Black or  female with PMH as below referred for evaluation of elevated bilirubin levels. Her bilirubin has been elevated consistently since 2/2017 and prior to this was intermittently elevated since 2/2016 when she established care here. Tbili has been as high as 5's. Indirect > direct. Transaminases and alk phos have been normal. INR 0.9-1.3, most recently was 1.1. She was hospitalized with CHF in late 8/2018. Tbili 5.6 then, improved to 2.5 today. Her reticulocytes have been elevated when checked twice in the past, last 6/2018. She has not had any issues with ascites. CT abd w/wo contrast in 6/2018 showed normal liver, spleen. U/s 12/2017 showed hepatomegaly at 20.8 cm, homogenous echotexture. Bile ducts normal. Spleen nl. She has normal plts in 200's. Chronic microcytic anemia. No findings to suggest overt cirrhosis.     Her recent 2D echo shows EF 20-25%. Pulmonary HTN. She is following closely with heart transplant clinic. Was supposed to see them today but her Medicaid transportation did not pick her up this morning. She will see them on Tuesday now. Has gained some weight since last week. Denies SOB.     She reports she has had some rectal bleeding x past 5 days, last yesterday but none today. Reports tightness in upper abdomen, has had hemorrhoid surgery 2 yrs ago. Also reports thin caliber stools since her surgery. Last colonoscopy 3-4 yrs ago. She would like to see someone for these issues. Tried to get in with PCP but they could not accommodate her.     She has a lot of psychosocial issues currently going on. See social work note from 9/7/18. She seems depressed today. Denies jaundice, dark urine, abdominal distention, hematemesis, melena, slowed mentation.        Review of patient's allergies indicates:   Allergen Reactions    Penicillins  "Hives    Percocet [oxycodone-acetaminophen] Other (See Comments)     Nausea, Dizziness, Anxiety.  "I don't like how it makes me feel."   Given Hydromorphone 0.5mg IVP  Without problems.    Diovan hct [valsartan-hydrochlorothiazide] Other (See Comments)     Causes coughing    Tramadol Nausea And Vomiting       Current Outpatient Medications on File Prior to Visit   Medication Sig Dispense Refill    albuterol-ipratropium 2.5mg-0.5mg/3mL (DUO-NEB) 0.5 mg-3 mg(2.5 mg base)/3 mL nebulizer solution Take 1 mL by nebulization every 6 (six) hours as needed for Wheezing or Shortness of Breath. 1 Box 2    ALPRAZolam (XANAX) 1 MG tablet Take 1 mg by mouth nightly as needed for Anxiety.      amitriptyline (ELAVIL) 25 MG tablet Take 1 tablet (25 mg total) by mouth every evening. 30 tablet 11    aspirin (ECOTRIN) 81 MG EC tablet Take 81 mg by mouth once daily.      atorvastatin (LIPITOR) 40 MG tablet Take 1 tablet (40 mg total) by mouth once daily. 90 tablet 3    b complex vitamins tablet Take 1 tablet by mouth once daily.      carvedilol (COREG) 25 MG tablet Take 1 tablet by mouth 2 (two) times daily.      cyanocobalamin, vitamin B-12, 50 mcg tablet Take 5 mcg by mouth once daily.      ELIQUIS 5 mg Tab TAKE 1 TABLET BY MOUTH TWICE DAILY 60 tablet 0    fluticasone-vilanterol (BREO ELLIPTA) 100-25 mcg/dose diskus inhaler Inhale 1 puff into the lungs once daily. Controller      isosorbide dinitrate (ISORDIL) 20 MG tablet Take 1 tablet (20 mg total) by mouth 3 (three) times daily. Please check that this is what you are already taking at home. 90 tablet 11    losartan (COZAAR) 25 MG tablet Take 1 tablet (25 mg total) by mouth once daily. 90 tablet 3    NITROSTAT 0.4 mg SL tablet Take 2.5 tablets (1 mg total) by mouth every 5 (five) minutes as needed for Chest pain. No more than 3 tablets in 15 minutes.      nystatin (MYCOSTATIN) powder Apply topically 2 (two) times daily. 30 g 1    pantoprazole (PROTONIX) 40 MG " tablet Take 1 tablet by mouth once daily.  5    potassium chloride (KLOR-CON) 10 MEQ TbSR Take 1 tablet (10 mEq total) by mouth once daily.      spironolactone (ALDACTONE) 25 MG tablet Take 1 tablet (25 mg total) by mouth once daily. 30 tablet 1    VENTOLIN HFA 90 mcg/actuation inhaler Inhale 2 puffs into the lungs 2 (two) times daily as needed.   11    furosemide (LASIX) 20 MG tablet Take 3 tablets (60 mg total) by mouth 3 (three) times daily. 270 tablet 2    walker Misc 1 Device by Misc.(Non-Drug; Combo Route) route as needed.  0     No current facility-administered medications on file prior to visit.        PMHX:  has a past medical history of Anemia, Anticoagulant long-term use, Arthritis, Atrial fibrillation, Congestive heart failure, COPD (chronic obstructive pulmonary disease), Encounter for blood transfusion, GERD (gastroesophageal reflux disease), Hypertension, Pheochromocytoma, malignant, Right kidney mass, Sleep apnea, and Thyroid disease.    PSHX:  has a past surgical history that includes Appendectomy; Hysterectomy; Cardiac defibrillator placement (Left, 12/2016); Fracture surgery (Left); Eye surgery; Knee surgery (Left, 2016); Right heart cath (Right, 2/6/2018); PROCEDURE PROLAPSE HEMORRHOID (PPH) (N/A, 3/7/2017); INSERTION-ICD-SINGLE (N/A, 12/2/2016); and HEART CATH-RIGHT (Right, 7/22/2016).    FAMILY HISTORY: Negative for liver disease, reviewed in [facility]    SOCIAL HISTORY:   Social History     Tobacco Use   Smoking Status Never Smoker   Smokeless Tobacco Never Used       Social History     Substance and Sexual Activity   Alcohol Use No    Comment: up to 1 yr ago drank 2-3 drinks on occasion but sporadic       Social History     Substance and Sexual Activity   Drug Use No         ROS:   GENERAL: Denies fever, chills, (+) weight gain, (+) fatigue  HEENT: Denies headaches, dizziness, vision/hearing changes  CARDIOVASCULAR: Denies chest pain, palpitations, or edema  RESPIRATORY: Denies dyspnea, (+)  "cough  GI: (+) abdominal pain, (+) rectal bleeding, nausea, vomiting. (+) thin caliber stools  : Denies dysuria, hematuria   SKIN: Denies rash, itching   NEURO: Denies confusion, memory loss, or mood changes  PSYCH:(+) depression  HEME/LYMPH: Denies easy bruising or bleeding        PHYSICAL EXAM:   Black or  female, in no acute distress; alert and oriented to person, place and time  VITALS: /71 (BP Location: Left arm, Patient Position: Sitting, BP Method: Medium (Automatic))   Pulse 72   Temp 98.1 °F (36.7 °C) (Oral)   Resp 18   Ht 5' 6" (1.676 m)   Wt 90.1 kg (198 lb 10.2 oz)   LMP  (LMP Unknown)   SpO2 96%   BMI 32.06 kg/m²   HENT: Normocephalic, without obvious abnormality. Oral mucosa pink and moist. Dentition good.  EYES: Sclerae anicteric. No conjunctival pallor.   NECK: Supple. No masses or cervical adenopathy.  CARDIOVASCULAR: Regular rate and rhythm. No murmurs.  RESPIRATORY: Normal respiratory effort. BBS CTA. No wheezes or crackles.  GI: Soft, non-tender, non-distended. No hepatosplenomegaly. No masses palpable. No ascites.  EXTREMITIES:  No clubbing, cyanosis or edema.  SKIN: Warm and dry. No jaundice. No rashes noted to exposed skin. No telangectasias noted. No palmar erythema.  NEURO:  Normal gate. No asterixis.  PSYCH:  Memory intact. Thought and speech pattern appropriate. Behavior normal. (+) depression noted.      RECENT LABS:    Labs:  Lab Results   Component Value Date    WBC 5.99 08/30/2018    HGB 10.0 (L) 08/30/2018    HCT 33.8 (L) 08/30/2018     08/30/2018    CHOL 165 12/07/2017    TRIG 166 (H) 12/07/2017    HDL 54 12/07/2017     09/07/2018    K 4.5 09/07/2018    CREATININE 1.5 (H) 09/07/2018    ALT 14 09/12/2018    AST 18 09/12/2018    ALKPHOS 62 09/12/2018    BILITOT 2.5 (H) 09/12/2018    ALBUMIN 3.9 09/12/2018    INR 1.1 08/20/2018       DIAGNOSTIC STUDIES:  ABD. U/S- 12/20/17  Findings:   The liver measures 20.8 cm.  Hepatic parenchyma is " homogeneous without evidence for masses.  The gallbladder is surgically absent. No intra- or extrahepatic biliary ductal dilatation. The common bile duct measures 0.5 cm.  The visualized portions of the pancreas and IVC are within normal limits.  The right kidney measures 12.1 cm without evidence of hydronephrosis.  There is a well-circumscribed anechoic structure within the right kidney measuring 1.1 x 1.1 x 1.0 cm, compatible with small renal cyst.  There is a 3.8 x 3.4 x 2.8 cm hypoechoic lesion superior to the right kidney, presumably representing the patient's known right adrenal gland lesion, seen on numerous prior CT examinations.      Impression       1.  Postoperative change of prior cholecystectomy.    2.  Hypoechoic lesion superior to the right kidney measuring 3.8 x 3.4 x 2.8 cm, presumably representing the patient's known right adrenal gland lesion, seen on multiple prior CT examinations.    3. Small right renal cyst and mild hepatomegaly.       CT SCAN- 6/25/18  FINDINGS:  The visualized portion of the base of the lungs is unremarkable.  The visualized portion of the heart is significant for partial visualization of a cardiac defibrillator lead.  The heart is enlarged.  The stomach, spleen, pancreas, and liver are unremarkable.  The gallbladder is absent.  The left adrenal gland is unremarkable.  There is a 4 cm right adrenal nodule unchanged in size compared to previous CT dated 05/24/2017.  This nodule does not measure the attenuation of a lipid rich adenoma.  The left kidney is unremarkable.  There is a 1.3 cm hyperdense cyst within the inferior right kidney the.  The kidneys appropriately concentrate and excrete contrast on the delayed images.  The bladder is unremarkable.  The uterus is absent or atrophic.  The bowel has a normal appearance.  The osseous structures demonstrate degenerative change.      Impression       4 cm right adrenal nodule unchanged in size compared to previous CT dated  05/24/2017.         ASSESSMENT:  53 y.o. Black or  female with:  1.  Elevated bilirubin, suspect d/t hemolysis +/- congestive hepatopathy  -- indirect > direct  -- range of normal to 5's  -- normal liver, bile ducts, and spleen on CT  -- normal transaminases and alk phos  -- INR nl  -- reticulocytes elevated suggesting hemolysis  2. CHF, AICD, A-fib, pulmonary HTN  -- following with heart transplant  -- may be considered for LVAD or heart transplant in future   3. Rectal bleeding, upper abd pain, stool change, microcytic anemia  -- referral to GI        EDUCATION:   Discussed with pt that her bilirubin may be elevated d/t hemolysis and/or her heart failure. No signs of cirrhosis but heart failure can sometimes cause this. Needs evaluation of fibrosis. Will start with u/s elastography but may need biopsy if they are definitely going to consider her for LVAD or heart transplant.       PLAN:  1. U/s elastography  2. Labs for retic count, hepatic panel, INR, hep B/C, iron/TIBC  3. Abd u/s  4. Referral to GI, to ER for any severe rectal bleeding  5. Consider liver biopsy if she will be considered for LVAD or heart transplant if elastography scan shows advanced fibrosis  6. F/u pending above. If she needs f/u in our clinic, will have her see Dr. Crocker at [facility] since this is much closer for her       Thank you for allowing me to participate in the care of Hafsa Sue FRANSISCA Kat

## 2018-09-25 ENCOUNTER — OFFICE VISIT (OUTPATIENT)
Dept: TRANSPLANT | Facility: CLINIC | Age: 53
End: 2018-09-25
Payer: MEDICAID

## 2018-09-25 ENCOUNTER — HOSPITAL ENCOUNTER (OUTPATIENT)
Dept: RADIOLOGY | Facility: HOSPITAL | Age: 53
Discharge: HOME | End: 2018-09-25
Attending: NURSE PRACTITIONER
Payer: MEDICAID

## 2018-09-25 VITALS
BODY MASS INDEX: 32.47 KG/M2 | DIASTOLIC BLOOD PRESSURE: 81 MMHG | HEART RATE: 135 BPM | HEIGHT: 65 IN | SYSTOLIC BLOOD PRESSURE: 167 MMHG | WEIGHT: 194.88 LBS

## 2018-09-25 DIAGNOSIS — N18.30 CHRONIC KIDNEY DISEASE, STAGE III (MODERATE): Chronic | ICD-10-CM

## 2018-09-25 DIAGNOSIS — J43.8 OTHER EMPHYSEMA: Chronic | ICD-10-CM

## 2018-09-25 DIAGNOSIS — I10 ESSENTIAL HYPERTENSION: Chronic | ICD-10-CM

## 2018-09-25 DIAGNOSIS — R17 SERUM TOTAL BILIRUBIN ELEVATED: ICD-10-CM

## 2018-09-25 DIAGNOSIS — Z95.810 ICD (IMPLANTABLE CARDIOVERTER-DEFIBRILLATOR) IN PLACE: Chronic | ICD-10-CM

## 2018-09-25 DIAGNOSIS — I50.43 ACUTE ON CHRONIC COMBINED SYSTOLIC AND DIASTOLIC CONGESTIVE HEART FAILURE: Primary | ICD-10-CM

## 2018-09-25 PROCEDURE — 99999 PR PBB SHADOW E&M-EST. PATIENT-LVL V: CPT | Mod: PBBFAC,TXP,, | Performed by: PHYSICIAN ASSISTANT

## 2018-09-25 PROCEDURE — 99214 OFFICE O/P EST MOD 30 MIN: CPT | Mod: S$PBB,NTX,, | Performed by: PHYSICIAN ASSISTANT

## 2018-09-25 PROCEDURE — 99215 OFFICE O/P EST HI 40 MIN: CPT | Mod: PBBFAC,NTX | Performed by: PHYSICIAN ASSISTANT

## 2018-09-25 RX ORDER — ISOSORBIDE DINITRATE 20 MG/1
40 TABLET ORAL 3 TIMES DAILY
Qty: 180 TABLET | Refills: 11 | Status: ON HOLD | OUTPATIENT
Start: 2018-09-25 | End: 2019-01-29 | Stop reason: SDUPTHER

## 2018-09-25 NOTE — LETTER
September 27, 2018        Andrea Pacheco  [address] [facility]  [address]  [facility] [address]  Phone: [phone]  Fax: [phone]             [facility] [address] [place] [address]  Phone: [phone]   Patient: Hafsa Hawley   MR Number: [id number]   YOB: 1965   Date of Visit: 9/25/2018       Dear Dr. Andrea Pacheco    Thank you for referring Hafsa Hawley to me for evaluation. Attached you will find relevant portions of my assessment and plan of care.    If you have questions, please do not hesitate to call me. I look forward to following Hafsa Hawley along with you.    Sincerely,    CABRERA Crum    Enclosure    If you would like to receive this communication electronically, please contact [email or web address] or [phone] to request [employer] Link access.    [employer] Link is a tool which provides read-only access to select patient information with whom you have a relationship. Its easy to use and provides real time access to review your patients record including encounter summaries, notes, results, and demographic information.    If you feel you have received this communication in error or would no longer like to receive these types of communications, please e-mail [email or web address]

## 2018-09-27 NOTE — PROGRESS NOTES
Subjective:     HPI:  Ms. Hawley is a 53 y.o. year old Black or  female who has presents for one month follow up.Seen by Dr Lorenzo to be evaluated as a potential heart transplant recipient (referred by Junior / Dana)   At her initial visit in July 2018, she was told to complete her pheochromocytoma. She was seen by [facility] who feel she is unlikely to have pheochromocytoma  They recommend that we can pursue heart transplant workup   Patient most recently admitted at [facility] with ADHF, BNP significantly elevated at 2573, had to be diuresed, additionally had episode of VT x 1, and chest pain overnight. Had elevated troponin as well at around .6, felt to be due to ADHF and tachycardia. Bili was elevated as well, bili all the way up to 5.6. Last visit was sent to hepatology and they plan to do US  elastography but felt ok to proceed with LVAD/OHT workup. Last visit she was to increase isordil to 2 tablets TID but did not do this. Today, feels less SOB. Has some mild musculoskeletal pains she feels is related to her AICD.      TTE 08/31/18:    1 - Severely depressed left ventricular systolic function (EF 20-25%).     2 - Impaired LV relaxation, elevated LAP (grade 2 diastolic dysfunction).     3 - Pulmonary hypertension. The estimated PA systolic pressure is 43 mmHg.     4 - Mild mitral regurgitation.     5 - Increased central venous pressure.     6 - Moderate pulmonic regurgitation.     7- LVH     Six minute walk 06/21/2018---------Distance: 292.61 meters (960 feet)     O2 Sat % Supplemental Oxygen Heart Rate Blood Pressure Haseeb   Scale   Pre-exercise  (Resting) 98 % Room Air 70 bpm 173/97 mmHg 0.5   During Exercise 95 % Room Air 80 bpm 143/100 mmHg 5-6   Post-exercise  (Recovery) 98 % Room Air  68 bpm 141/93 mmHg       Past Medical History:   Diagnosis Date    Anemia     Anticoagulant long-term use     Arthritis     Atrial fibrillation     Congestive heart failure     COPD  "(chronic obstructive pulmonary disease)     Encounter for blood transfusion     GERD (gastroesophageal reflux disease)     Hypertension     Pheochromocytoma, malignant     Right kidney mass     Sleep apnea     Thyroid disease      Past Surgical History:   Procedure Laterality Date    APPENDECTOMY      CARDIAC DEFIBRILLATOR PLACEMENT Left 12/2016    EYE SURGERY      due to running tears    FRACTURE SURGERY Left     hand 5th digit    HEART CATH-RIGHT Right 7/22/2016    [facility] at [facility] CATH LAB    HYSTERECTOMY      INSERTION-ICD-SINGLE N/A 12/2/2016    [facility] LAB    KNEE SURGERY Left 2016    hematoma    PROCEDURE PROLAPSE HEMORRHOID (PPH) N/A 3/7/2017    Per[facility] by GEORGE Rothman [facility] at [facility] OR    Right heart cath Right 2/6/2018    Performed by Benson Cortez MD at [facility] CATH LAB     OB History     No data available        Review of Systems   Constitution: Negative for chills, decreased appetite, diaphoresis, fever, weakness and weight gain.   Cardiovascular: Negative for chest pain, claudication, cyanosis, dyspnea on exertion and paroxysmal nocturnal dyspnea.   Respiratory: Negative for cough, hemoptysis, shortness of breath, sleep disturbances due to breathing and snoring.    Skin: Negative for nail changes.   Gastrointestinal: Negative for bloating, nausea and vomiting.   Psychiatric/Behavioral: Negative for depression.       Objective:   Blood pressure (!) 167/81, pulse (!) 135, height 5' 5" (1.651 m), weight 88.4 kg (194 lb 14.2 oz).body mass index is 32.43 kg/m².  Physical Exam   Constitutional: She appears well-developed.   HENT:   Head: Normocephalic.   Eyes: Pupils are equal, round, and reactive to light.   Neck: Normal range of motion. No JVD present.   Cardiovascular: Normal rate. Exam reveals no friction rub.   No murmur heard.  Pulmonary/Chest: Effort normal. No respiratory distress.   Abdominal: Soft. She exhibits no " distension. There is no tenderness.   Musculoskeletal: Normal range of motion. She exhibits no edema.   Neurological: She is alert. No cranial nerve deficit.   Skin: Skin is warm. She is not diaphoretic. No erythema.       Labs:    Chemistry        Component Value Date/Time     09/21/2018 1430    K 3.4 (L) 09/21/2018 1430     09/21/2018 1430    CO2 27 09/21/2018 1430    BUN 24 (H) 09/21/2018 1430    CREATININE 1.2 09/21/2018 1430     (H) 09/21/2018 1430        Component Value Date/Time    CALCIUM 9.6 09/21/2018 1430    ALKPHOS 64 09/21/2018 1430    AST 21 09/21/2018 1430    ALT 11 09/21/2018 1430    BILITOT 2.5 (H) 09/21/2018 1430    ESTGFRAFRICA 59.6 (A) 09/21/2018 1430    EGFRNONAA 51.7 (A) 09/21/2018 1430          Magnesium   Date Value Ref Range Status   09/03/2018 1.9 1.6 - 2.6 mg/dL Final     Lab Results   Component Value Date    WBC 5.99 08/30/2018    HGB 10.0 (L) 08/30/2018    HCT 33.8 (L) 08/30/2018     08/30/2018     Lab Results   Component Value Date    INR 1.1 08/20/2018    INR 1.2 08/01/2018    INR 1.1 06/11/2018     BNP   Date Value Ref Range Status   09/07/2018 471 (H) 0 - 99 pg/mL Final     Comment:     Values of less than 100 pg/ml are consistent with non-CHF populations.   08/30/2018 2,573 (H) 0 - 99 pg/mL Final     Comment:     Values of less than 100 pg/ml are consistent with non-CHF populations.   08/13/2018 647 (H) 0 - 99 pg/mL Final     Comment:     Values of less than 100 pg/ml are consistent with non-CHF populations.     LD   Date Value Ref Range Status   06/11/2018 308 (H) 110 - 260 U/L Final     Comment:     Results are increased in hemolyzed samples.   08/23/2016 280 (H) 110 - 260 U/L Final     Comment:     Results are increased in hemolyzed samples.     No results found for this or any previous visit.  No results found for this or any previous visit.    Assessment:      1. Acute on chronic combined systolic and diastolic congestive heart failure    2. Chronic  kidney disease, stage III (moderate)    3. Other emphysema    4. Essential hypertension    5. ICD (implantable cardioverter-defibrillator) in place        Plan:   Increase isordil to 2 tablets TID.  Will follow up hepatology recs after US but may likely need biopsy.  RTC in 2 months.   Patient is now NYHA III  Recommend 2 gram sodium restriction and 1500cc fluid restriction.  Encourage physical activity with graded exercise program.  Requested patient to weigh themselves daily, and to notify us if their weight increases by more than 3 lbs in 1 day or 5 lbs in 1 week.     Listed for transplant: No

## 2018-10-02 ENCOUNTER — OFFICE VISIT (OUTPATIENT)
Dept: GASTROENTEROLOGY | Facility: CLINIC | Age: 53
End: 2018-10-02
Payer: MEDICAID

## 2018-10-02 VITALS
SYSTOLIC BLOOD PRESSURE: 130 MMHG | DIASTOLIC BLOOD PRESSURE: 80 MMHG | BODY MASS INDEX: 32.49 KG/M2 | WEIGHT: 195 LBS | HEIGHT: 65 IN | HEART RATE: 80 BPM

## 2018-10-02 DIAGNOSIS — K62.5 RECTAL BLEEDING: Primary | ICD-10-CM

## 2018-10-02 DIAGNOSIS — I50.43 ACUTE ON CHRONIC COMBINED SYSTOLIC AND DIASTOLIC CONGESTIVE HEART FAILURE: ICD-10-CM

## 2018-10-02 DIAGNOSIS — R10.30 LOWER ABDOMINAL PAIN: ICD-10-CM

## 2018-10-02 DIAGNOSIS — I42.0 DILATED CARDIOMYOPATHY: Chronic | ICD-10-CM

## 2018-10-02 PROCEDURE — 99999 PR PBB SHADOW E&M-EST. PATIENT-LVL III: CPT | Mod: PBBFAC,TXP,, | Performed by: INTERNAL MEDICINE

## 2018-10-02 PROCEDURE — 99204 OFFICE O/P NEW MOD 45 MIN: CPT | Mod: S$PBB,NTX,, | Performed by: INTERNAL MEDICINE

## 2018-10-02 PROCEDURE — 99213 OFFICE O/P EST LOW 20 MIN: CPT | Mod: PBBFAC,PN,NTX | Performed by: INTERNAL MEDICINE

## 2018-10-02 RX ORDER — POLYETHYLENE GLYCOL 3350, SODIUM SULFATE ANHYDROUS, SODIUM BICARBONATE, SODIUM CHLORIDE, POTASSIUM CHLORIDE 236; 22.74; 6.74; 5.86; 2.97 G/4L; G/4L; G/4L; G/4L; G/4L
POWDER, FOR SOLUTION ORAL
Qty: 1 BOTTLE | Refills: 0 | Status: SHIPPED | OUTPATIENT
Start: 2018-10-02 | End: 2018-12-10

## 2018-10-02 NOTE — PATIENT INSTRUCTIONS
GOLYTELY Instructions    You are scheduled for a colonoscopy with Dr. Boykin on Thursday, Octoboer 18 at [facility] located at [address] in [place].  Enter through the [facility] Entrance.  Check-in at Same Day Surgery.      You will receive a call 2-3 days before your colonoscopy to tell you the time to arrive.  If you have not received a call by the day before your procedure, call the Endoscopy Lab at [phone].    To ensure that your test is accurate and complete, you MUST follow these instructions listed below.  If you have any questions, please call our office at [phone].  Plan on being at the hospital for your procedure for 3-4 hours.    1.  Follow a CLEAR LIQUID DIET for the entire day before your scheduled colonoscopy.  This means no solid food the entire day starting when you wake.  You may have as much of the clear liquids as you want throughout the day.   CLEAR LIQUID DIET:   - Avoid Red, Orange, Purple, and/or Blue food coloring   - NO DAIRY   - You can have:  Coffee with sugar (no creamer), tea, water, soda, apple or white grape juice, chicken or beef broth/bouillon (no meat, noodles, or veggies), green/yellow popsicles, green/yellow Jell-O, lemonade.    2.  MIX GOLYTELY/COLYTE/NULYTELY (all names for same product) WITH ONE (1) GALLON OF WATER.  YOU MAY ADD A FLAVOR PACKET OR YELLOW/GREEN POWDER DRINK MIX TO THIS.  PUT IN REFRIGERATOR.  This is easier to drink if this solution is cold, so you can mix the solution one day ahead of time and place in the refrigerator prior to drinking.  You have to drink the solution within 24-36 hours of mixing it.  Do NOT put this solution over ice.  It IS ok to drink with a straw.    3. AT 5 PM THE DAY BEFORE YOUR COLONOSCOPY, DRINK ONE (1) 8 OUNCE GLASS OF MIXTURE EVERY 10 MINUTES UNTIL HALF OF THE GALLON IS CONSUMED.  Keep this mixture cold and in refrigerator as much as you can while drinking it.  Place the remaining half of mixture  in the refrigerator when you finish the first half.    4.  The endoscopy department will call you 2 days before your colonoscopy to tell you the exact time to arrive, AND to tell you the exact time to drink the 2nd portion of your prep (which will be FIVE HOURS BEFORE YOUR ARRIVAL TIME).  At this time given to you, DRINK ONE (1) 8 OUNCE GLASS OF MIXTURE EVERY 10 MINUTES UNTIL THE OTHER HALF IS CONSUMED. Keep the mixture cold while you are drinking it. Once this is complete, you may not have ANYTHING else by mouth!      5.  You must have someone with you to DRIVE YOU HOME since you will be receiving IV sedation for the colonoscopy.    6.  It is ok to take your heart, blood pressure, and seizure medications in the morning of your test with a SIP of water.  Hold other medications until after your procedure.  Do NOT have anything else to eat or drink the morning of your colonoscopy.  It is ok to brush your teeth.    7.  If you are on blood thinners THAT YOU HAVE BEEN INSTRUCTED TO HOLD BY YOUR DOCTOR FOR THIS PROCEDURE, then do NOT take this the morning of your colonoscopy.  Do NOT stop these medications on your own, they must be approved to be held by your doctor.  Your colonoscopy can NOT be done if you are on these medications.  Examples of blood thinners include: Coumadin, Aggrenox, Plavix, Pradaxa, Reapro, Pletal, Xarelto, Ticagrelor, Brilinta, Eliquis, and high dose aspirin (325 mg).  You do not have to stop baby aspirin 81 mg.    8.  IF YOU ARE DIABETIC:  NO INSULIN OR ORAL MEDICATIONS THE MORNING OF THE COLONOSCOPY.  TAKE ONLY HALF THE DOSE OF YOUR INSULIN THE DAY BEFORE THE COLONOSCOPY.  DO NOT TAKE ANY ORAL DIABETIC MEDICATIONS THE DAY BEFORE THE COLONOSCOPY.  IF YOU ARE AN INSULIN DEPENDENT DIABETIC WITH UNSTABLE BLOOD SUGARDS, NOTIFY YOUR PRIMARY CARE PHYSICIAN FOR INSTRUCTIONS.

## 2018-10-02 NOTE — PROGRESS NOTES
Subjective:       Patient ID: Hafsa Hawley is a 53 y.o. female.    Chief Complaint: Abdominal Pain and Rectal Bleeding    52 yo F complains of lower abdominal pain and rectal bleeding.  The pt has extensive comorbidities including severe CHF with dilated cardiomyopathy under consideration for heart transplant.  She was just recently admitted for hypoxemia and volume overload, and states that when she got home she developed lower cramping abdominal pain associated with rectal bleeding which lasted 5 days and then spontaneously stopped.  Her bowel habits did not change during that time period and she continued with her 3-5 soft BM per day.  She has h/o rectal bleeding and had hemorrhoid surgery with Dr. Rothman 2 years ago.  She states her last colonoscopy was approximately 3 years ago; she has had 2-3 colonoscopies in the past and denies having polyps on any of them.       Review of Systems   Constitutional: Negative for appetite change and unexpected weight change.   Eyes: Negative for photophobia and visual disturbance.   Respiratory: Negative for chest tightness, shortness of breath and wheezing.    Cardiovascular: Negative for chest pain, palpitations and leg swelling.   Genitourinary: Negative for dysuria, flank pain and hematuria.   Musculoskeletal: Negative for joint swelling and myalgias.   Skin: Negative for color change and rash.   Neurological: Negative for dizziness and speech difficulty.   Psychiatric/Behavioral: Negative for confusion and hallucinations.       Objective:      Physical Exam   Constitutional: She is oriented to person, place, and time. She appears well-developed and well-nourished.   Eyes: EOM are normal. Pupils are equal, round, and reactive to light.   Neck: Normal range of motion. Neck supple.   Cardiovascular: Normal rate and regular rhythm.   Pulmonary/Chest: Effort normal and breath sounds normal.   Abdominal: Soft. Bowel sounds are normal. She exhibits no distension and no mass.  There is no tenderness. There is no rebound and no guarding.   Musculoskeletal: Normal range of motion. She exhibits no edema.   Neurological: She is alert and oriented to person, place, and time.   Psychiatric: She has a normal mood and affect. Her behavior is normal. Judgment and thought content normal.       Assessment:       1. Rectal bleeding    2. Lower abdominal pain    3. Dilated cardiomyopathy    4. Acute on chronic combined systolic and diastolic congestive heart failure        Plan:       Rectal bleeding; Lower abdominal pain  -     Case request GI: COLONOSCOPY  -     polyethylene glycol (GOLYTELY,NULYTELY) 236-22.74-6.74 -5.86 gram suspension; Use as directed  Dispense: 1 Bottle; Refill: 0  -     Clinically it sounds like the pt had ischemic colitis after aggressive diuresis for volume overload, symptoms resolved.  -     I am doing the colonoscopy b/c she is to undergo evaluation for heart transplant and will need another colonoscopy    Dilated cardiomyopathy; Acute on chronic combined systolic and diastolic congestive heart failure        -     The pt will need clearance from her cardiologist Dr. Cortez both for the procedure and to hold her Eliquis for 3 days prior to the procedure        -     The pt is very high risk for endoscopy due to underlying health issues

## 2018-10-02 NOTE — LETTER
October 2, 2018      Laura Lindo, NP  [address]  [facility] [address]           [facility]  [address], [address]   [facility] [address]  Phone: [phone]  Fax: [phone]          Patient: Hafsa Hawley   MR Number: [id number]   YOB: 1965   Date of Visit: 10/2/2018       Dear Laura Lindo:    Thank you for referring Hafsa Hawley to me for evaluation. Attached you will find relevant portions of my assessment and plan of care.    If you have questions, please do not hesitate to call me. I look forward to following Hafsa Hawley along with you.    Sincerely,    Cheryl Boykin MD    Enclosure  CC:  No Recipients    If you would like to receive this communication electronically, please contact [email or web address] or [phone] to request more information on wmb[facility] Link access.    For providers and/or their staff who would like to refer a patient to [facility], please contact us through our one-stop-shop provider referral line, [facility], at [phone].    If you feel you have received this communication in error or would no longer like to receive these types of communications, please e-mail [email or web address]

## 2018-10-03 ENCOUNTER — HOSPITAL ENCOUNTER (OUTPATIENT)
Dept: RADIOLOGY | Facility: HOSPITAL | Age: 53
Discharge: HOME | End: 2018-10-03
Attending: NURSE PRACTITIONER
Payer: MEDICAID

## 2018-10-03 ENCOUNTER — TELEPHONE (OUTPATIENT)
Dept: HEPATOLOGY | Facility: CLINIC | Age: 53
End: 2018-10-03

## 2018-10-03 DIAGNOSIS — R17 TOTAL BILIRUBIN, ELEVATED: Primary | ICD-10-CM

## 2018-10-03 DIAGNOSIS — I50.43 ACUTE ON CHRONIC COMBINED SYSTOLIC AND DIASTOLIC CONGESTIVE HEART FAILURE: ICD-10-CM

## 2018-10-03 PROCEDURE — 91200 LIVER ELASTOGRAPHY: CPT | Mod: TC,TXP

## 2018-10-03 PROCEDURE — 91200 LIVER ELASTOGRAPHY: CPT | Mod: 26,NTX,, | Performed by: RADIOLOGY

## 2018-10-03 NOTE — TELEPHONE ENCOUNTER
Spoke with pt regarding us elastography results that show F2-F3. Will need TJ liver biopsy for full assessment. Discussed liver biopsy procedure and possible complications associated with liver biopsy including pain, bleeding, infection, and organ perforation. Reviewed the role of the procedure including confirming of diagnosis and staging of liver disease so pt can be appropriately followed from this point forward. She is on Eliquis and aspirin.     Please coordinate TJ liver biopsy.

## 2018-10-03 NOTE — TELEPHONE ENCOUNTER
----- Message from Shena Washburn sent at 10/3/2018  1:24 PM CDT -----  Contact: Patient  Needs Advice    Reason for call: Patient asked that Laura gives her a call. Patient did not explain what the concerns were.        Communication Preference: [phone]    Additional Information: n/a

## 2018-10-04 ENCOUNTER — TELEPHONE (OUTPATIENT)
Dept: SLEEP MEDICINE | Facility: OTHER | Age: 53
End: 2018-10-04

## 2018-10-04 ENCOUNTER — TELEPHONE (OUTPATIENT)
Dept: GASTROENTEROLOGY | Facility: CLINIC | Age: 53
End: 2018-10-04

## 2018-10-04 NOTE — TELEPHONE ENCOUNTER
----- Message from Chely Vitale sent at 10/4/2018  9:24 AM CDT -----  Contact: [phone]/sts  Patient requesting to speak with you concerning rectal bleeding. Please advise.

## 2018-10-04 NOTE — TELEPHONE ENCOUNTER
----- Message from Cheryl Boykin MD sent at 10/4/2018  2:24 PM CDT -----  Keep her stool soft with stool softener as needed.  Keep already scheduled colonoscopy.    ----- Message -----  From: Hiral Sim MA  Sent: 10/4/2018   1:53 PM  To: Cheryl Boykin MD    Patient called an stated that she is having some light color rectal bleeding.  With no symptoms of abd pain. Please advise.

## 2018-10-05 ENCOUNTER — TELEPHONE (OUTPATIENT)
Dept: HEPATOLOGY | Facility: CLINIC | Age: 53
End: 2018-10-05

## 2018-10-05 NOTE — TELEPHONE ENCOUNTER
Received information from the patients cardiologist, Dr Benson Cortez.  The patient needed clearance for TJ Liver Biopsy and advise on medication management for Eliquis.  The patient is low risk for procedure.  The patient can Hold the Eliquis for 48 hrs before the procedure and restart once cleared by physician.    Patient called on all telephone numbers listed in the system. No Answer. Need to discuss the TJ Liver Biopsy and review Allergies.  Please call us back at [phone] this is the Office of Laura Lindo at [facility] my name is Lety.  TJ Liver Biopsy instructions placed in the mail.

## 2018-10-16 ENCOUNTER — TELEPHONE (OUTPATIENT)
Dept: GASTROENTEROLOGY | Facility: CLINIC | Age: 53
End: 2018-10-16

## 2018-10-16 NOTE — TELEPHONE ENCOUNTER
----- Message from Moni Motta sent at 10/16/2018  2:52 PM CDT -----  Contact: [phone]/self  Patient is requesting a call back regarding when to stop taking blood thinner? Does she need to stop taking anything else. Thanks

## 2018-10-16 NOTE — TELEPHONE ENCOUNTER
----- Message from Yamile Pelaez sent at 10/16/2018 12:07 PM CDT -----  Contact: [phone]/self  Patient called in returning your call. Please advise.

## 2018-10-16 NOTE — TELEPHONE ENCOUNTER
Tried calling patient back to inform her that no one call from this office and that she might have gotten a call from endo confirming her arrival time for procedure.

## 2018-10-16 NOTE — TELEPHONE ENCOUNTER
Patient is aware that the office is waiting to receive clearance from her cardiologist  for the procedure and to hold her Eliquis for 3 days prior to the procedure. A call placed to his office an  approval was given waiting on office to fax over information.

## 2018-10-17 ENCOUNTER — TELEPHONE (OUTPATIENT)
Dept: HEPATOLOGY | Facility: CLINIC | Age: 53
End: 2018-10-17

## 2018-10-17 ENCOUNTER — TELEPHONE (OUTPATIENT)
Dept: SLEEP MEDICINE | Facility: OTHER | Age: 53
End: 2018-10-17

## 2018-10-17 ENCOUNTER — TELEPHONE (OUTPATIENT)
Dept: GASTROENTEROLOGY | Facility: CLINIC | Age: 53
End: 2018-10-17

## 2018-10-17 NOTE — TELEPHONE ENCOUNTER
Left message on cell to reschedule her sleep study.  Not able to leave a message on her home number.

## 2018-10-17 NOTE — TELEPHONE ENCOUNTER
Patient called and message relayed to the patient from Laura Lindo NP regarding doing the TJ Liver BIopsy.  We have cleared it with Dr Benson Cortez and you can be off of the Eliquis for 48 hours before the Biopsy.  Pt voiced understanding and agreed to do the Procedure. I took an Eliquis on today 10/17/18.    The patient would like to do the Biopsy on Mon 10/22/18.  Dr's appt on Tuesday 10/23.  Patient stated she is not allergic to Iodine. No rash, hives or feeling sick with the dye. Just a warm feeling. I have had the dye twice recently and nothing happened.    IR called and Mon 10/22/18 is not available.  Dates available are Wed 10/24 at 12:30 pm, Thurs 10/25 at 11:30 am and Friday 10/26 at 6:30 am.  The patient will call and check with her sister when she can take off to bring her.

## 2018-10-17 NOTE — TELEPHONE ENCOUNTER
The patient returned the call to the clinic She stated she would like to do the TJ Liver Biopsy on Wednesday 10/24/18 with Check in Time for 12:30 pm.  The last dose of Eliquis will be taken on Chan 10/21/18. Pt will be off on the Eliquis for 48 hrs before the Procedure and resume on Thursday 10/25/18.  Pre and Post Procedure teaching done with the patient.  Follow-Up Office Visit for the Liver Biopsy results will be on Wednesday 11/7/18 at 8 am.  Appt letters placed in the mail.  IR called and left voice mail message about the confirmation of the appt date.

## 2018-10-17 NOTE — TELEPHONE ENCOUNTER
Patient procedure Colonoscopy was rescheduled due to not receiving Medication/ Procedure Clearance Form back in time from  office / [facility] fax # [phone] and office # [phone]. Patient is aware that procedure was reschedule for 10/30/18 and that the office will give her a call back when everything has been approved.. Form was scan in under media. Second request was faxed over.

## 2018-10-19 DIAGNOSIS — R17 TOTAL BILIRUBIN, ELEVATED: Primary | ICD-10-CM

## 2018-10-21 ENCOUNTER — HOSPITAL ENCOUNTER (OUTPATIENT)
Facility: HOSPITAL | Age: 53
Discharge: HOME | End: 2018-10-22
Attending: EMERGENCY MEDICINE | Admitting: FAMILY MEDICINE
Payer: MEDICAID

## 2018-10-21 DIAGNOSIS — G47.33 OSA (OBSTRUCTIVE SLEEP APNEA): Chronic | ICD-10-CM

## 2018-10-21 DIAGNOSIS — R07.9 CHEST PAIN: ICD-10-CM

## 2018-10-21 DIAGNOSIS — I10 ESSENTIAL HYPERTENSION: Chronic | ICD-10-CM

## 2018-10-21 DIAGNOSIS — R79.89 ELEVATED TROPONIN: ICD-10-CM

## 2018-10-21 DIAGNOSIS — D50.9 MICROCYTIC ANEMIA: ICD-10-CM

## 2018-10-21 DIAGNOSIS — I48.0 PAROXYSMAL ATRIAL FIBRILLATION: Chronic | ICD-10-CM

## 2018-10-21 DIAGNOSIS — R07.9 CHEST PAIN IN ADULT: ICD-10-CM

## 2018-10-21 DIAGNOSIS — I42.8 NICM (NONISCHEMIC CARDIOMYOPATHY): Chronic | ICD-10-CM

## 2018-10-21 DIAGNOSIS — R07.9 CHEST PAIN, UNSPECIFIED TYPE: Primary | ICD-10-CM

## 2018-10-21 PROBLEM — I50.43 ACUTE ON CHRONIC COMBINED SYSTOLIC AND DIASTOLIC CONGESTIVE HEART FAILURE: Status: RESOLVED | Noted: 2018-03-16 | Resolved: 2018-10-21

## 2018-10-21 PROBLEM — K62.5 RECTAL BLEEDING: Status: RESOLVED | Noted: 2018-10-02 | Resolved: 2018-10-21

## 2018-10-21 PROBLEM — I21.4 NSTEMI (NON-ST ELEVATED MYOCARDIAL INFARCTION): Status: RESOLVED | Noted: 2018-08-30 | Resolved: 2018-10-21

## 2018-10-21 PROBLEM — R05.9 COUGH: Status: RESOLVED | Noted: 2017-02-04 | Resolved: 2018-10-21

## 2018-10-21 PROBLEM — E27.8 ADRENAL MASS, RIGHT: Status: RESOLVED | Noted: 2017-04-19 | Resolved: 2018-10-21

## 2018-10-21 LAB
ALBUMIN SERPL BCP-MCNC: 3.7 G/DL
ALP SERPL-CCNC: 67 U/L
ALT SERPL W/O P-5'-P-CCNC: 12 U/L
ANION GAP SERPL CALC-SCNC: 11 MMOL/L
ANISOCYTOSIS BLD QL SMEAR: ABNORMAL
APTT BLDCRRT: 26.8 SEC
AST SERPL-CCNC: 19 U/L
BASOPHILS # BLD AUTO: 0.01 K/UL
BASOPHILS NFR BLD: 0.2 %
BILIRUB SERPL-MCNC: 3 MG/DL
BNP SERPL-MCNC: 924 PG/ML
BUN SERPL-MCNC: 27 MG/DL
CALCIUM SERPL-MCNC: 9.5 MG/DL
CHLORIDE SERPL-SCNC: 109 MMOL/L
CHOLEST SERPL-MCNC: 136 MG/DL
CHOLEST/HDLC SERPL: 2.3 {RATIO}
CO2 SERPL-SCNC: 23 MMOL/L
CREAT SERPL-MCNC: 1 MG/DL
DACRYOCYTES BLD QL SMEAR: ABNORMAL
DIFFERENTIAL METHOD: ABNORMAL
EOSINOPHIL # BLD AUTO: 0.3 K/UL
EOSINOPHIL NFR BLD: 6.5 %
ERYTHROCYTE [DISTWIDTH] IN BLOOD BY AUTOMATED COUNT: 26.6 %
EST. GFR  (AFRICAN AMERICAN): >60 ML/MIN/1.73 M^2
EST. GFR  (NON AFRICAN AMERICAN): >60 ML/MIN/1.73 M^2
ESTIMATED AVG GLUCOSE: 91 MG/DL
FERRITIN SERPL-MCNC: 185 NG/ML
FOLATE SERPL-MCNC: 13 NG/ML
GLUCOSE SERPL-MCNC: 105 MG/DL
HBA1C MFR BLD HPLC: 4.8 %
HCT VFR BLD AUTO: 31.2 %
HDLC SERPL-MCNC: 59 MG/DL
HDLC SERPL: 43.4 %
HGB BLD-MCNC: 9.1 G/DL
HYPOCHROMIA BLD QL SMEAR: ABNORMAL
INR PPP: 1
IRON SERPL-MCNC: 83 UG/DL
LDLC SERPL CALC-MCNC: 62 MG/DL
LYMPHOCYTES # BLD AUTO: 0.9 K/UL
LYMPHOCYTES NFR BLD: 20 %
MAGNESIUM SERPL-MCNC: 1.8 MG/DL
MCH RBC QN AUTO: 16.5 PG
MCHC RBC AUTO-ENTMCNC: 29.2 G/DL
MCV RBC AUTO: 57 FL
MONOCYTES # BLD AUTO: 0.4 K/UL
MONOCYTES NFR BLD: 9.6 %
NEUTROPHILS # BLD AUTO: 2.8 K/UL
NEUTROPHILS NFR BLD: 63.7 %
NONHDLC SERPL-MCNC: 77 MG/DL
OVALOCYTES BLD QL SMEAR: ABNORMAL
PHOSPHATE SERPL-MCNC: 3.8 MG/DL
PLATELET # BLD AUTO: 246 K/UL
PLATELET BLD QL SMEAR: ABNORMAL
PMV BLD AUTO: ABNORMAL FL
POIKILOCYTOSIS BLD QL SMEAR: ABNORMAL
POLYCHROMASIA BLD QL SMEAR: ABNORMAL
POTASSIUM SERPL-SCNC: 3.9 MMOL/L
PROT SERPL-MCNC: 7.4 G/DL
PROTHROMBIN TIME: 10.3 SEC
RBC # BLD AUTO: 5.52 M/UL
SATURATED IRON: 30 %
SCHISTOCYTES BLD QL SMEAR: PRESENT
SODIUM SERPL-SCNC: 143 MMOL/L
TARGETS BLD QL SMEAR: ABNORMAL
TOTAL IRON BINDING CAPACITY: 280 UG/DL
TRANSFERRIN SERPL-MCNC: 189 MG/DL
TRIGL SERPL-MCNC: 75 MG/DL
TROPONIN I SERPL DL<=0.01 NG/ML-MCNC: 0.66 NG/ML
TROPONIN I SERPL DL<=0.01 NG/ML-MCNC: 0.71 NG/ML
TROPONIN I SERPL DL<=0.01 NG/ML-MCNC: 0.74 NG/ML
TSH SERPL DL<=0.005 MIU/L-ACNC: 0.76 UIU/ML
VIT B12 SERPL-MCNC: 939 PG/ML
WBC # BLD AUTO: 4.46 K/UL

## 2018-10-21 PROCEDURE — 83735 ASSAY OF MAGNESIUM: CPT | Mod: NTX

## 2018-10-21 PROCEDURE — 99900035 HC TECH TIME PER 15 MIN (STAT): Mod: NTX

## 2018-10-21 PROCEDURE — 80053 COMPREHEN METABOLIC PANEL: CPT | Mod: NTX

## 2018-10-21 PROCEDURE — G0378 HOSPITAL OBSERVATION PER HR: HCPCS | Mod: NTX

## 2018-10-21 PROCEDURE — 94660 CPAP INITIATION&MGMT: CPT | Mod: NTX

## 2018-10-21 PROCEDURE — 99285 EMERGENCY DEPT VISIT HI MDM: CPT | Mod: NTX,25

## 2018-10-21 PROCEDURE — 85025 COMPLETE CBC W/AUTO DIFF WBC: CPT | Mod: NTX

## 2018-10-21 PROCEDURE — 84100 ASSAY OF PHOSPHORUS: CPT | Mod: NTX

## 2018-10-21 PROCEDURE — 94761 N-INVAS EAR/PLS OXIMETRY MLT: CPT | Mod: NTX

## 2018-10-21 PROCEDURE — 93005 ELECTROCARDIOGRAM TRACING: CPT | Mod: NTX,59

## 2018-10-21 PROCEDURE — 27000190 HC CPAP FULL FACE MASK W/VALVE: Mod: NTX

## 2018-10-21 PROCEDURE — 83036 HEMOGLOBIN GLYCOSYLATED A1C: CPT | Mod: NTX

## 2018-10-21 PROCEDURE — 85730 THROMBOPLASTIN TIME PARTIAL: CPT | Mod: NTX

## 2018-10-21 PROCEDURE — 80061 LIPID PANEL: CPT | Mod: NTX

## 2018-10-21 PROCEDURE — 84484 ASSAY OF TROPONIN QUANT: CPT | Mod: NTX

## 2018-10-21 PROCEDURE — 84484 ASSAY OF TROPONIN QUANT: CPT | Mod: 91,NTX

## 2018-10-21 PROCEDURE — 84443 ASSAY THYROID STIM HORMONE: CPT | Mod: NTX

## 2018-10-21 PROCEDURE — 82607 VITAMIN B-12: CPT | Mod: NTX

## 2018-10-21 PROCEDURE — 82728 ASSAY OF FERRITIN: CPT | Mod: NTX

## 2018-10-21 PROCEDURE — 85610 PROTHROMBIN TIME: CPT | Mod: NTX

## 2018-10-21 PROCEDURE — 83540 ASSAY OF IRON: CPT | Mod: NTX

## 2018-10-21 PROCEDURE — 84238 ASSAY NONENDOCRINE RECEPTOR: CPT | Mod: NTX

## 2018-10-21 PROCEDURE — 25000003 PHARM REV CODE 250: Mod: NTX | Performed by: STUDENT IN AN ORGANIZED HEALTH CARE EDUCATION/TRAINING PROGRAM

## 2018-10-21 PROCEDURE — 82746 ASSAY OF FOLIC ACID SERUM: CPT | Mod: NTX

## 2018-10-21 PROCEDURE — 83880 ASSAY OF NATRIURETIC PEPTIDE: CPT | Mod: NTX

## 2018-10-21 RX ORDER — ONDANSETRON 8 MG/1
8 TABLET, ORALLY DISINTEGRATING ORAL EVERY 6 HOURS PRN
Status: DISCONTINUED | OUTPATIENT
Start: 2018-10-21 | End: 2018-10-22 | Stop reason: HOSPADM

## 2018-10-21 RX ORDER — CLOPIDOGREL BISULFATE 75 MG/1
300 TABLET ORAL ONCE
Status: COMPLETED | OUTPATIENT
Start: 2018-10-21 | End: 2018-10-21

## 2018-10-21 RX ORDER — SPIRONOLACTONE 25 MG/1
25 TABLET ORAL DAILY
Status: DISCONTINUED | OUTPATIENT
Start: 2018-10-21 | End: 2018-10-22 | Stop reason: HOSPADM

## 2018-10-21 RX ORDER — AMOXICILLIN 250 MG
1 CAPSULE ORAL 2 TIMES DAILY
Status: DISCONTINUED | OUTPATIENT
Start: 2018-10-21 | End: 2018-10-22 | Stop reason: HOSPADM

## 2018-10-21 RX ORDER — NAPROXEN SODIUM 220 MG/1
81 TABLET, FILM COATED ORAL DAILY
Status: DISCONTINUED | OUTPATIENT
Start: 2018-10-22 | End: 2018-10-22 | Stop reason: HOSPADM

## 2018-10-21 RX ORDER — ALPRAZOLAM 1 MG/1
1 TABLET ORAL NIGHTLY PRN
Status: DISCONTINUED | OUTPATIENT
Start: 2018-10-21 | End: 2018-10-22 | Stop reason: HOSPADM

## 2018-10-21 RX ORDER — SODIUM CHLORIDE 0.9 % (FLUSH) 0.9 %
5 SYRINGE (ML) INJECTION
Status: DISCONTINUED | OUTPATIENT
Start: 2018-10-21 | End: 2018-10-22 | Stop reason: HOSPADM

## 2018-10-21 RX ORDER — LIDOCAINE AND PRILOCAINE 25; 25 MG/G; MG/G
CREAM TOPICAL ONCE
Status: DISCONTINUED | OUTPATIENT
Start: 2018-10-21 | End: 2018-10-22 | Stop reason: HOSPADM

## 2018-10-21 RX ORDER — ASPIRIN 81 MG/1
81 TABLET ORAL DAILY
Status: DISCONTINUED | OUTPATIENT
Start: 2018-10-21 | End: 2018-10-21

## 2018-10-21 RX ORDER — ASPIRIN 325 MG
325 TABLET ORAL ONCE
Status: DISCONTINUED | OUTPATIENT
Start: 2018-10-21 | End: 2018-10-22 | Stop reason: HOSPADM

## 2018-10-21 RX ORDER — ACETAMINOPHEN 325 MG/1
650 TABLET ORAL EVERY 6 HOURS PRN
Status: DISCONTINUED | OUTPATIENT
Start: 2018-10-21 | End: 2018-10-22 | Stop reason: HOSPADM

## 2018-10-21 RX ORDER — ATORVASTATIN CALCIUM 40 MG/1
80 TABLET, FILM COATED ORAL DAILY
Status: DISCONTINUED | OUTPATIENT
Start: 2018-10-21 | End: 2018-10-22 | Stop reason: HOSPADM

## 2018-10-21 RX ORDER — INSULIN ASPART 100 [IU]/ML
1-10 INJECTION, SOLUTION INTRAVENOUS; SUBCUTANEOUS
Status: DISCONTINUED | OUTPATIENT
Start: 2018-10-21 | End: 2018-10-22 | Stop reason: HOSPADM

## 2018-10-21 RX ORDER — NITROGLYCERIN 0.4 MG/1
0.4 TABLET SUBLINGUAL EVERY 5 MIN PRN
Status: DISCONTINUED | OUTPATIENT
Start: 2018-10-21 | End: 2018-10-22 | Stop reason: HOSPADM

## 2018-10-21 RX ORDER — IBUPROFEN 200 MG
16 TABLET ORAL
Status: DISCONTINUED | OUTPATIENT
Start: 2018-10-21 | End: 2018-10-22 | Stop reason: HOSPADM

## 2018-10-21 RX ORDER — ASPIRIN 325 MG
325 TABLET ORAL DAILY
Status: DISCONTINUED | OUTPATIENT
Start: 2018-10-21 | End: 2018-10-21

## 2018-10-21 RX ORDER — IBUPROFEN 200 MG
24 TABLET ORAL
Status: DISCONTINUED | OUTPATIENT
Start: 2018-10-21 | End: 2018-10-22 | Stop reason: HOSPADM

## 2018-10-21 RX ORDER — LOSARTAN POTASSIUM 25 MG/1
25 TABLET ORAL DAILY
Status: DISCONTINUED | OUTPATIENT
Start: 2018-10-21 | End: 2018-10-22 | Stop reason: HOSPADM

## 2018-10-21 RX ORDER — ISOSORBIDE DINITRATE 10 MG/1
40 TABLET ORAL 3 TIMES DAILY
Status: DISCONTINUED | OUTPATIENT
Start: 2018-10-21 | End: 2018-10-22 | Stop reason: HOSPADM

## 2018-10-21 RX ORDER — GLUCAGON 1 MG
1 KIT INJECTION
Status: DISCONTINUED | OUTPATIENT
Start: 2018-10-21 | End: 2018-10-22 | Stop reason: HOSPADM

## 2018-10-21 RX ORDER — CLOPIDOGREL BISULFATE 75 MG/1
75 TABLET ORAL DAILY
Status: DISCONTINUED | OUTPATIENT
Start: 2018-10-22 | End: 2018-10-22 | Stop reason: HOSPADM

## 2018-10-21 RX ORDER — DIPHENHYDRAMINE HYDROCHLORIDE 50 MG/ML
25 INJECTION INTRAMUSCULAR; INTRAVENOUS EVERY 6 HOURS PRN
Status: DISCONTINUED | OUTPATIENT
Start: 2018-10-21 | End: 2018-10-22 | Stop reason: HOSPADM

## 2018-10-21 RX ORDER — LIDOCAINE 50 MG/G
1 PATCH TOPICAL
Status: DISCONTINUED | OUTPATIENT
Start: 2018-10-21 | End: 2018-10-22 | Stop reason: HOSPADM

## 2018-10-21 RX ORDER — IPRATROPIUM BROMIDE AND ALBUTEROL SULFATE 2.5; .5 MG/3ML; MG/3ML
1 SOLUTION RESPIRATORY (INHALATION) EVERY 6 HOURS PRN
Status: DISCONTINUED | OUTPATIENT
Start: 2018-10-21 | End: 2018-10-22 | Stop reason: HOSPADM

## 2018-10-21 RX ORDER — MORPHINE SULFATE 4 MG/ML
4 INJECTION, SOLUTION INTRAMUSCULAR; INTRAVENOUS EVERY 4 HOURS PRN
Status: DISCONTINUED | OUTPATIENT
Start: 2018-10-21 | End: 2018-10-22 | Stop reason: HOSPADM

## 2018-10-21 RX ORDER — PANTOPRAZOLE SODIUM 40 MG/1
40 TABLET, DELAYED RELEASE ORAL DAILY
Status: DISCONTINUED | OUTPATIENT
Start: 2018-10-21 | End: 2018-10-22 | Stop reason: HOSPADM

## 2018-10-21 RX ORDER — CARVEDILOL 25 MG/1
25 TABLET ORAL 2 TIMES DAILY
Status: DISCONTINUED | OUTPATIENT
Start: 2018-10-21 | End: 2018-10-22 | Stop reason: HOSPADM

## 2018-10-21 RX ADMIN — ACETAMINOPHEN 650 MG: 325 TABLET ORAL at 04:10

## 2018-10-21 RX ADMIN — ASPIRIN 325 MG ORAL TABLET 325 MG: 325 PILL ORAL at 02:10

## 2018-10-21 RX ADMIN — ISOSORBIDE DINITRATE 40 MG: 10 TABLET ORAL at 04:10

## 2018-10-21 RX ADMIN — LIDOCAINE 1 PATCH: 50 PATCH TOPICAL at 04:10

## 2018-10-21 RX ADMIN — ATORVASTATIN CALCIUM 80 MG: 40 TABLET, FILM COATED ORAL at 04:10

## 2018-10-21 RX ADMIN — CLOPIDOGREL BISULFATE 300 MG: 75 TABLET ORAL at 02:10

## 2018-10-21 RX ADMIN — SENNOSIDES AND DOCUSATE SODIUM 1 TABLET: 8.6; 5 TABLET ORAL at 08:10

## 2018-10-21 RX ADMIN — ISOSORBIDE DINITRATE 40 MG: 10 TABLET ORAL at 08:10

## 2018-10-21 RX ADMIN — FUROSEMIDE 60 MG: 20 TABLET ORAL at 04:10

## 2018-10-21 RX ADMIN — FUROSEMIDE 60 MG: 20 TABLET ORAL at 08:10

## 2018-10-21 RX ADMIN — CARVEDILOL 25 MG: 25 TABLET, FILM COATED ORAL at 09:10

## 2018-10-21 NOTE — PLAN OF CARE
Problem: Patient Care Overview  Goal: Plan of Care Review  Outcome: Ongoing (interventions implemented as appropriate)   10/21/18 3048   Coping/Psychosocial   Plan Of Care Reviewed With patient   Patient oriented X4 to name, place, situation, and date. Patient on telemetry monitor, no ectopy has been noted. Patient has received medications for pain. Patient  bed alarm is on, bed in lower position, and can ambulate with miniumal assistance, patient has been asked to call for assist and verbalized agreement.

## 2018-10-21 NOTE — ED PROVIDER NOTES
"Encounter Date: 10/21/2018    SCRIBE #1 NOTE: I, Corinna Enriquez, am scribing for, and in the presence of,  Dr. Motta. I have scribed the entire note.       History     Chief Complaint   Patient presents with    Anxiety     Had verbal altercation with son this morning, started having chest pain.  Took 2 of her own SL NTG at home prior to EMS arrival.  EMS reports BP elevated on scene.       Hafsa Hawley is a 53 y.o. female who  has a past medical history of Anemia, Anticoagulant long-term use, Arthritis, Atrial fibrillation, Congestive heart failure, COPD (chronic obstructive pulmonary disease), Encounter for blood transfusion, GERD (gastroesophageal reflux disease), Hypertension, Pheochromocytoma, malignant, Right kidney mass, Sleep apnea, and Thyroid disease.    The patient presents to the ED due to chest pain. She reports onset of symptoms was a few hours ago. The patient notes the pain began after getting into an argument with her son. She states she took Nitroglycerin for the pain prior to ED. The patient reports her pain is improved. She states she is uncertain of why she is here. She claims she is upset about the ongoing situation with her son.  The patient reports every time she has an argument she has chest pain. She also reported shortness of breath and palpitations which have resolved.           The history is provided by the patient and the EMS personnel.     Review of patient's allergies indicates:   Allergen Reactions    Penicillins Hives    Percocet [oxycodone-acetaminophen] Other (See Comments)     Nausea, Dizziness, Anxiety.  "I don't like how it makes me feel."   Given Hydromorphone 0.5mg IVP  Without problems.    Diovan hct [valsartan-hydrochlorothiazide] Other (See Comments)     Causes coughing    Tramadol Nausea And Vomiting     Past Medical History:   Diagnosis Date    Anemia     Anticoagulant long-term use     Arthritis     Atrial fibrillation     Congestive heart failure     " COPD (chronic obstructive pulmonary disease)     Encounter for blood transfusion     GERD (gastroesophageal reflux disease)     Hypertension     Pheochromocytoma, malignant     Right kidney mass     Sleep apnea     Thyroid disease      Past Surgical History:   Procedure Laterality Date    APPENDECTOMY      CARDIAC DEFIBRILLATOR PLACEMENT Left 2016    EYE SURGERY      due to running tears    FRACTURE SURGERY Left     hand 5th digit    HEART CATH-RIGHT Right 2016    [facility] LAB    HYSTERECTOMY      INSERTION-ICD-SINGLE N/A 2016    [facility]    KNEE SURGERY Left 2016    hematoma    PROCEDURE PROLAPSE HEMORRHOID (PPH) N/A 3/7/2017    [facility]man [facility] at [facility] OR    Right heart cath Right 2018    Performed by Benson Cortez [facility] at [facility] CATH LAB     Family History   Problem Relation Age of Onset    Cancer Mother         pancreatic CA early 50's    Heart disease Father          MI in late 50's    Hypertension Father     Heart disease Sister     Heart disease Brother     Cirrhosis Brother         alcoholic    Heart disease Sister     Heart disease Brother     Hypertension Brother     Diabetes Brother      Social History     Tobacco Use    Smoking status: Never Smoker    Smokeless tobacco: Never Used   Substance Use Topics    Alcohol use: No     Comment: up to 1 yr ago drank 2-3 drinks on occasion but sporadic    Drug use: No     Review of Systems   Constitutional: Negative for chills and fever.   HENT: Negative for congestion, rhinorrhea and sore throat.    Eyes: Negative for redness and visual disturbance.   Respiratory: Positive for shortness of breath. Negative for cough and wheezing.    Cardiovascular: Positive for chest pain and palpitations.   Gastrointestinal: Negative for abdominal pain, diarrhea, nausea and vomiting.   Genitourinary: Negative for dysuria and hematuria.    Musculoskeletal: Negative for back pain, myalgias and neck pain.   Skin: Negative for rash.   Neurological: Negative for dizziness, weakness and light-headedness.   Psychiatric/Behavioral: Positive for dysphoric mood. Negative for confusion. The patient is nervous/anxious.        Physical Exam     Initial Vitals [10/21/18 1044]   BP Pulse Resp Temp SpO2   (!) 157/98 89 18 97.7 °F (36.5 °C) 95 %      MAP       --         Physical Exam    Nursing note and vitals reviewed.  Constitutional: She appears well-developed and well-nourished. She is not diaphoretic. No distress.   Tearful    HENT:   Head: Normocephalic and atraumatic.   Mouth/Throat: Oropharynx is clear and moist.   Eyes: Conjunctivae and EOM are normal. Pupils are equal, round, and reactive to light.   Neck: Normal range of motion. Neck supple.   Cardiovascular: Normal rate, regular rhythm and normal heart sounds. Exam reveals no gallop and no friction rub.    No murmur heard.  Pulmonary/Chest: Breath sounds normal. She has no wheezes. She has no rhonchi. She has no rales.   Abdominal: Soft. Bowel sounds are normal. There is no tenderness. There is no rebound and no guarding.   Musculoskeletal: Normal range of motion. She exhibits no edema or tenderness.   Lymphadenopathy:     She has no cervical adenopathy.   Neurological: She is alert and oriented to person, place, and time. She has normal strength.   Skin: Skin is warm and dry. Capillary refill takes less than 2 seconds. No rash noted.         ED Course   Procedures  Labs Reviewed   CBC W/ AUTO DIFFERENTIAL - Abnormal; Notable for the following components:       Result Value    RBC 5.52 (*)     Hemoglobin 9.1 (*)     Hematocrit 31.2 (*)     MCV 57 (*)     MCH 16.5 (*)     MCHC 29.2 (*)     RDW 26.6 (*)     Lymph # 0.9 (*)     All other components within normal limits   COMPREHENSIVE METABOLIC PANEL - Abnormal; Notable for the following components:    BUN, Bld 27 (*)     Total Bilirubin 3.0 (*)     All  other components within normal limits   TROPONIN I - Abnormal; Notable for the following components:    Troponin I 0.655 (*)     All other components within normal limits     EKG Readings: (Independently Interpreted)   11:20 AM: Normal sinus rhythm with a rate of 75. CHEL. LVH. No ST or T wave changes. No STEMI       Imaging Results          X-Ray Chest PA And Lateral (Final result)  Result time 10/21/18 11:29:22    Final result by Roldan Cohen MD (10/21/18 11:29:22)                 Impression:      No acute findings.      Electronically signed by: Roldan Cohen MD  Date:    10/21/2018  Time:    11:29             Narrative:    EXAMINATION:  XR CHEST PA AND LATERAL    CLINICAL HISTORY:  Chest pain, unspecified    TECHNIQUE:  PA and lateral views of the chest were performed.    COMPARISON:  08/30/2018    FINDINGS:  Enlarged cardiac silhouette, similar to the prior exam.  The lungs are clear.  No pneumothorax.  No pleural effusions.                                 Medical Decision Making:   Independently Interpreted Test(s):   I have ordered and independently interpreted EKG Reading(s) - see prior notes  Clinical Tests:   Lab Tests: Ordered and Reviewed  Radiological Study: Ordered and Reviewed  Medical Tests: Ordered and Reviewed                   ED Course as of Oct 21 1244   Sun Oct 21, 2018   [facility] consulted to evaluate the patient for possible admission  [ST]      ED Course User Index  [ST] Jeanne Motta MD     Clinical Impression:     1. Chest pain in adult    2. Elevated troponin         I, Jeanne Motta, personally performed the services described in this documentation. All medical record entries made by the scribe were at my direction and in my presence.  I have reviewed the chart and agree that the record reflects my personal performance and is accurate and complete. Jeanne Motta M.D. 12:42 PM10/21/2018                   Jeanne Motta MD  10/21/18 1243       Jeanne Motta MD  10/21/18  1935

## 2018-10-21 NOTE — H&P
"History & Physical  Family Medicine    Subjective    CC chest pain      History of Present Illness:  53 y.o. female with a pertinent PMH of chronic heart failure with EF of 25% (EF 8/28), HTN, COPD, anemia, paroxysmal Afib, paroxysmal SVT, s/p ICD placement, NSTEMI, MELISSA, GERD, and anxiety presents for chest pain that started this morning. She had an intermittent retrosternal chest tightness that lasted a few minutes and was described as "pressure like". She also reported diaphoresis and SOB during this event. She denied nausea, vomiting, and syncope. The pain had no radiation and it was alleviated with nitro. She also notes having a seperate sharp pain in her left chest under her left breast that is different from her retrosternal pain. She reports that her chest pain is sometimes associated with her anxiety but she also has chest pain intermittently at random times not associated with stress. She also reports having chest pain with walking.. She sees Dr. Cortez her cardiologist and says that she was assessing if she needed a heart transplant. She denies bilateral leg swelling and unilateral leg tenderness. Denies illicit drug use. She is not scheduled to see her cardiologist for the next few months. She does have tenderness over the site of her pacemaker. She reports multiple family members with MI. Last echo was in 8/18 that showed EF 20% and LVH. She had a RHC on 08/24/2016 with a of PW: 40/39 (34), PA: 53/27 (41), RV: 54/14, RVEDP: 20, RA: 21/19 (18), PA_SAT: 41; A repeat RHC on 08/29/2016 had a  RA: 16/13 (12), PW: 25/25 (23), AO_SAT: 100, RV: 39/7, RVEDP: 11, PA: 39/21 (29), PA_SAT: 44. She is taking her ASA and apixaban 5 mg BID.       Review of Systems   Constitutional: Negative for chills, diaphoresis, fever, malaise/fatigue and weight loss.   HENT: Negative for congestion, nosebleeds and sore throat.    Eyes: Negative for blurred vision, double vision and photophobia.   Respiratory: Positive for shortness " of breath. Negative for cough, hemoptysis, sputum production and wheezing.    Cardiovascular: Positive for chest pain. Negative for palpitations, orthopnea, leg swelling and PND.   Gastrointestinal: Negative for abdominal pain, blood in stool, constipation, diarrhea, melena, nausea and vomiting.   Genitourinary: Negative for dysuria, flank pain and frequency.   Musculoskeletal: Negative for back pain, joint pain, myalgias and neck pain.   Skin: Negative for rash.   Neurological: Negative for dizziness, sensory change, speech change, focal weakness, seizures, loss of consciousness, weakness and headaches.   Endo/Heme/Allergies: Negative for polydipsia.   Psychiatric/Behavioral: Positive for depression. The patient is nervous/anxious.          Past Medical History:   Diagnosis Date    Anemia     Anticoagulant long-term use     Arthritis     Atrial fibrillation     Congestive heart failure     COPD (chronic obstructive pulmonary disease)     Encounter for blood transfusion     GERD (gastroesophageal reflux disease)     Hypertension     Pheochromocytoma, malignant     Right kidney mass     Sleep apnea     Thyroid disease        Past Surgical History:   Procedure Laterality Date    APPENDECTOMY      CARDIAC DEFIBRILLATOR PLACEMENT Left 2016    EYE SURGERY      due to running tears    FRACTURE SURGERY Left     hand 5th digit    HEART CATH-RIGHT Right 2016    [facility] at [facility] C[facility] LAB    HYSTERECTOMY      INSERTION-ICD-SINGLE N/A 2016    [facility]    KNEE SURGERY Left 2016    hematoma    PROCEDURE PROLAPSE HEMORRHOID (PPH) N/A 3/7/2017    Performed by GEORGE Rothman MD at [facility] OR    Right heart cath Right 2018    Performed by Benson Cortez MD at [facility] CATH LAB       Family History   Problem Relation Age of Onset    Cancer Mother         pancreatic CA early 50's    Heart disease Father          MI in late 50's     Hypertension Father     Heart disease Sister     Heart disease Brother     Cirrhosis Brother         alcoholic    Heart disease Sister     Heart disease Brother     Hypertension Brother     Diabetes Brother        Social History     Socioeconomic History    Marital status:      Spouse name: None    Number of children: None    Years of education: None    Highest education level: None   Social Needs    Financial resource strain: None    Food insecurity - worry: None    Food insecurity - inability: None    Transportation needs - medical: None    Transportation needs - non-medical: None   Occupational History    None   Tobacco Use    Smoking status: Never Smoker    Smokeless tobacco: Never Used   Substance and Sexual Activity    Alcohol use: No     Comment: up to 1 yr ago drank 2-3 drinks on occasion but sporadic    Drug use: No    Sexual activity: Yes     Partners: Male   Other Topics Concern    None   Social History Narrative    On disability since 2013       No current facility-administered medications for this encounter.      Current Outpatient Medications   Medication Sig Dispense Refill    albuterol-ipratropium 2.5mg-0.5mg/3mL (DUO-NEB) 0.5 mg-3 mg(2.5 mg base)/3 mL nebulizer solution Take 1 mL by nebulization every 6 (six) hours as needed for Wheezing or Shortness of Breath. 1 Box 2    ALPRAZolam (XANAX) 1 MG tablet Take 1 mg by mouth nightly as needed for Anxiety.      amitriptyline (ELAVIL) 25 MG tablet Take 1 tablet (25 mg total) by mouth every evening. 30 tablet 11    aspirin (ECOTRIN) 81 MG EC tablet Take 81 mg by mouth once daily.      atorvastatin (LIPITOR) 40 MG tablet Take 1 tablet (40 mg total) by mouth once daily. 90 tablet 3    b complex vitamins tablet Take 1 tablet by mouth once daily.      carvedilol (COREG) 25 MG tablet Take 1 tablet by mouth 2 (two) times daily.      cyanocobalamin, vitamin B-12, 50 mcg tablet Take 5 mcg by mouth once daily.       "ELIQUIS 5 mg Tab TAKE 1 TABLET BY MOUTH TWICE DAILY 60 tablet 0    fluticasone-vilanterol (BREO ELLIPTA) 100-25 mcg/dose diskus inhaler Inhale 1 puff into the lungs once daily. Controller      furosemide (LASIX) 20 MG tablet Take 3 tablets (60 mg total) by mouth 3 (three) times daily. 270 tablet 2    isosorbide dinitrate (ISORDIL) 20 MG tablet Take 2 tablets (40 mg total) by mouth 3 (three) times daily. Please check that this is what you are already taking at home. 180 tablet 11    losartan (COZAAR) 25 MG tablet Take 1 tablet (25 mg total) by mouth once daily. 90 tablet 3    NITROSTAT 0.4 mg SL tablet Take 2.5 tablets (1 mg total) by mouth every 5 (five) minutes as needed for Chest pain. No more than 3 tablets in 15 minutes.      nystatin (MYCOSTATIN) powder Apply topically 2 (two) times daily. 30 g 1    pantoprazole (PROTONIX) 40 MG tablet Take 1 tablet by mouth once daily.  5    polyethylene glycol (GOLYTELY,NULYTELY) 236-22.74-6.74 -5.86 gram suspension Use as directed 1 Bottle 0    potassium chloride (KLOR-CON) 10 MEQ TbSR Take 1 tablet (10 mEq total) by mouth once daily.      spironolactone (ALDACTONE) 25 MG tablet Take 1 tablet (25 mg total) by mouth once daily. 30 tablet 1    VENTOLIN HFA 90 mcg/actuation inhaler Inhale 2 puffs into the lungs 2 (two) times daily as needed.   11    walker Misc 1 Device by Misc.(Non-Drug; Combo Route) route as needed.  0       Review of patient's allergies indicates:   Allergen Reactions    Penicillins Hives    Percocet [oxycodone-acetaminophen] Other (See Comments)     Nausea, Dizziness, Anxiety.  "I don't like how it makes me feel."   Given Hydromorphone 0.5mg IVP  Without problems.    Diovan hct [valsartan-hydrochlorothiazide] Other (See Comments)     Causes coughing    Tramadol Nausea And Vomiting       Objective  Temp:  [97.7 °F (36.5 °C)]   Pulse:  [67-89]   Resp:  [17-18]   BP: (130-157)/(81-98)   SpO2:  [95 %-99 %]   There is no height or weight on file " to calculate BMI.     Physical Exam:    General: flat affect.   HEENT: Conjunctivae and EOM are normal. No scleral icterus.   Neck: supple. No masses. No thyromegaly. No bruits.  Lymph nodes: no lymphadenopathy  Cardio: RRR. S1, S2 normal without murmur/gallop/rub. No S3, S4. chest pain elicited  with palpation of left chest over pacemaker. Intact distal pulses.   Pulmonary: CTAB. No wheezes/rales/crackles.  Skin: No rash noted. Patient is not diaphoretic. No pallor.   Abdomen: soft, non-tender, non-distended. No masses. No rebound/guarding. No  hepatosplenomegaly. +BS  Extremities: no cyanosis, clubbing, or edema. No rash or lesions. + pedal pulses  MSK: No edema or tenderness.  Neuro: CN II-XII grossly intact. No decrease in strength. No decrease in sensation.  Psychiatry: alert and oriented X3. Responds appropriately to questions.    Laboratory:    Most Recent Data:  CBC:   Lab Results   Component Value Date    WBC 4.46 10/21/2018    HGB 9.1 (L) 10/21/2018    HCT 31.2 (L) 10/21/2018     10/21/2018    MCV 57 (L) 10/21/2018    RDW 26.6 (H) 10/21/2018     BMP:   Lab Results   Component Value Date     10/21/2018    K 3.9 10/21/2018     10/21/2018    CO2 23 10/21/2018    BUN 27 (H) 10/21/2018     10/21/2018    CALCIUM 9.5 10/21/2018    MG 1.9 09/03/2018    PHOS 4.0 05/10/2018     LFTs:   Lab Results   Component Value Date    PROT 7.4 10/21/2018    ALBUMIN 3.7 10/21/2018    BILITOT 3.0 (H) 10/21/2018    AST 19 10/21/2018    ALKPHOS 67 10/21/2018    ALT 12 10/21/2018     Coags:   Lab Results   Component Value Date    INR 1.2 09/27/2018     FLP:   Lab Results   Component Value Date    CHOL 165 12/07/2017    HDL 54 12/07/2017    LDLCALC 77.8 12/07/2017    TRIG 166 (H) 12/07/2017    CHOLHDL 32.7 12/07/2017     DM:   Lab Results   Component Value Date    HGBA1C 5.1 05/10/2018    HGBA1C 5.0 12/07/2017    HGBA1C 6.3 (H) 07/20/2016    LDLCALC 77.8 12/07/2017    CREATININE 1.0 10/21/2018     HgbA1c:    Lab Results   Component Value Date    HGBA1C 5.1 05/10/2018     Thyroid:   Lab Results   Component Value Date    TSH 1.939 06/21/2018    R6CYGOJ 8.4 09/11/2017     Anemia:   Lab Results   Component Value Date    IRON 102 09/27/2018    TIBC 272 09/27/2018    FERRITIN 283 08/23/2016    KCFRXBLQ04 >2000 (H) 09/20/2017    FOLATE 12.3 09/20/2017     Cardiac:   Lab Results   Component Value Date    TROPONINI 0.655 (H) 10/21/2018    CKTOTAL 47 02/19/2016    CKMB 1.82 02/19/2016     (H) 09/07/2018     Trended Lab Data:  Recent Labs   Lab 10/21/18  1131   WBC 4.46   HGB 9.1*   HCT 31.2*      MCV 57*   RDW 26.6*      K 3.9      CO2 23   BUN 27*      PROT 7.4   ALBUMIN 3.7   BILITOT 3.0*   AST 19   ALKPHOS 67   ALT 12     Trended Cardiac Data:  Recent Labs   Lab 10/21/18  1131   TROPONINI 0.655*       No results found for: EF    No results found for this visit on 10/21/18.    Microbiology Results (last 7 days)     ** No results found for the last 168 hours. **          Urinalysis:   Lab Results   Component Value Date    LABURIN  07/29/2018     Multiple organisms isolated. None in predominance.  Repeat if    LABURIN clinically necessary. 07/29/2018    COLORU Yellow 07/29/2018    SPECGRAV >=1.030 (A) 07/29/2018    NITRITE Negative 07/29/2018    KETONESU Negative 07/29/2018    UROBILINOGEN Negative 07/29/2018       X-Ray Chest PA And Lateral   Final Result      No acute findings.         Electronically signed by: Roldan Cohen MD   Date:    10/21/2018   Time:    11:29            Assessment/Plan:     Elevated Troponin   Chest pain this morning relieved with nitro   Trend trops x 3 and EKG x 3  Recent Labs   Lab 10/21/18  1131   TROPONINI 0.655*    No results found for: EF   EKG LVT no ST elevation   Troponin is chronically elevated at baseline  CXR unremarkable   Echo ordered  Aspirin   Nitro sublingual prn  Coreg   ARB   Atorvastatin continue   Plavix 300 loaded, 75 daily  Patient on apixaban at  home held per cardiology rec   Morphine prn   [facility] contacted and will follow the patient   Echo 8/18 EF 20% and LVH  NPO      CHF   CXR did not show pulmonary edema  Lasix continue  Coreg continue  Spironolactone continue  ARB continue  Clinically euvolemic  Strict input/output    A fib  ICD in place   Apixaban at home held per cards recs   Stable     COPD  Stable on room air  Not on home oxygen   Duo neb prn  Monitor    HTN  Elevated blood pressure in ED  Lasix continue  Coreg continue  Spironolactone continue  Isosorbide continue  Patient was worked up for pheo and endocrinology thought she did not have it   Drug panel ordered    MELISSA  CPAP    Anxiety  SSRI    GERD  PPI continue     Microcytic anemia  Stable 9/31    CODE: FULL   DVT Prophylaxis: on home apixaban held per cards recs        Dispo: f/u trops and ekg and [facility]      Case discussed with staff.     Yefri Ramirez MD  10/21/2018

## 2018-10-21 NOTE — NURSING
VN introduction. Patient arrived to the floor from the ER.  Floor nurse at the bedside.  Patient a little teary eyed.  VN will come back to do admit.
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
## 2018-10-21 NOTE — NURSING
VN checking into room to go over admit.  Patient awake and alert and able to answer all admit questions.  Admit done.  Patient is requesting something for the pain in her lower back radiating down to her left buttock.  Patient is requesting a cream or a patch for the pain.  Dr Dunne's group paged. I will continue to monitor the patient.

## 2018-10-22 VITALS
SYSTOLIC BLOOD PRESSURE: 127 MMHG | OXYGEN SATURATION: 96 % | RESPIRATION RATE: 18 BRPM | HEIGHT: 66 IN | HEART RATE: 76 BPM | TEMPERATURE: 98 F | WEIGHT: 197.75 LBS | DIASTOLIC BLOOD PRESSURE: 66 MMHG | BODY MASS INDEX: 31.78 KG/M2

## 2018-10-22 LAB
ALBUMIN SERPL BCP-MCNC: 3.7 G/DL
ALP SERPL-CCNC: 65 U/L
ALT SERPL W/O P-5'-P-CCNC: 11 U/L
AMPHET+METHAMPHET UR QL: NEGATIVE
ANION GAP SERPL CALC-SCNC: 10 MMOL/L
AST SERPL-CCNC: 18 U/L
BARBITURATES UR QL SCN>200 NG/ML: NEGATIVE
BENZODIAZ UR QL SCN>200 NG/ML: NEGATIVE
BILIRUB SERPL-MCNC: 3.3 MG/DL
BUN SERPL-MCNC: 26 MG/DL
BZE UR QL SCN: NEGATIVE
CALCIUM SERPL-MCNC: 9.7 MG/DL
CANNABINOIDS UR QL SCN: NEGATIVE
CHLORIDE SERPL-SCNC: 104 MMOL/L
CO2 SERPL-SCNC: 28 MMOL/L
CREAT SERPL-MCNC: 1.2 MG/DL
CREAT UR-MCNC: 89.7 MG/DL
DIASTOLIC DYSFUNCTION: NO
DIASTOLIC DYSFUNCTION: YES
EST. GFR  (AFRICAN AMERICAN): 60 ML/MIN/1.73 M^2
EST. GFR  (NON AFRICAN AMERICAN): 52 ML/MIN/1.73 M^2
ESTIMATED PA SYSTOLIC PRESSURE: 22.01
GLUCOSE SERPL-MCNC: 97 MG/DL
METHADONE UR QL SCN>300 NG/ML: NEGATIVE
MITRAL VALVE MOBILITY: NORMAL
MITRAL VALVE REGURGITATION: ABNORMAL
OPIATES UR QL SCN: NEGATIVE
PCP UR QL SCN>25 NG/ML: NEGATIVE
POTASSIUM SERPL-SCNC: 3.9 MMOL/L
PROT SERPL-MCNC: 7.3 G/DL
RETIRED EF AND QEF - SEE NOTES: 20 (ref 55–65)
SODIUM SERPL-SCNC: 142 MMOL/L
TOXICOLOGY INFORMATION: NORMAL
TRICUSPID VALVE REGURGITATION: ABNORMAL
TROPONIN I SERPL DL<=0.01 NG/ML-MCNC: 0.7 NG/ML

## 2018-10-22 PROCEDURE — 99900035 HC TECH TIME PER 15 MIN (STAT): Mod: NTX

## 2018-10-22 PROCEDURE — 93017 CV STRESS TEST TRACING ONLY: CPT | Mod: NTX

## 2018-10-22 PROCEDURE — G8988 SELF CARE GOAL STATUS: HCPCS | Mod: CI,NTX

## 2018-10-22 PROCEDURE — 80053 COMPREHEN METABOLIC PANEL: CPT | Mod: NTX

## 2018-10-22 PROCEDURE — 90686 IIV4 VACC NO PRSV 0.5 ML IM: CPT | Mod: NTX | Performed by: FAMILY MEDICINE

## 2018-10-22 PROCEDURE — 84484 ASSAY OF TROPONIN QUANT: CPT | Mod: NTX

## 2018-10-22 PROCEDURE — G8989 SELF CARE D/C STATUS: HCPCS | Mod: CI,NTX

## 2018-10-22 PROCEDURE — 36415 COLL VENOUS BLD VENIPUNCTURE: CPT | Mod: NTX

## 2018-10-22 PROCEDURE — 97165 OT EVAL LOW COMPLEX 30 MIN: CPT | Mod: NTX

## 2018-10-22 PROCEDURE — 93018 CV STRESS TEST I&R ONLY: CPT | Mod: NTX,,, | Performed by: INTERNAL MEDICINE

## 2018-10-22 PROCEDURE — 94761 N-INVAS EAR/PLS OXIMETRY MLT: CPT | Mod: NTX

## 2018-10-22 PROCEDURE — G0378 HOSPITAL OBSERVATION PER HR: HCPCS | Mod: NTX

## 2018-10-22 PROCEDURE — 80307 DRUG TEST PRSMV CHEM ANLYZR: CPT | Mod: NTX

## 2018-10-22 PROCEDURE — 93306 TTE W/DOPPLER COMPLETE: CPT | Mod: 26,NTX,, | Performed by: INTERNAL MEDICINE

## 2018-10-22 PROCEDURE — G8980 MOBILITY D/C STATUS: HCPCS | Mod: CH,NTX

## 2018-10-22 PROCEDURE — 63600175 PHARM REV CODE 636 W HCPCS: Mod: NTX | Performed by: FAMILY MEDICINE

## 2018-10-22 PROCEDURE — 99214 OFFICE O/P EST MOD 30 MIN: CPT | Mod: 25,NTX,, | Performed by: INTERNAL MEDICINE

## 2018-10-22 PROCEDURE — 25000003 PHARM REV CODE 250: Mod: NTX | Performed by: STUDENT IN AN ORGANIZED HEALTH CARE EDUCATION/TRAINING PROGRAM

## 2018-10-22 PROCEDURE — 97161 PT EVAL LOW COMPLEX 20 MIN: CPT | Mod: NTX

## 2018-10-22 PROCEDURE — G8987 SELF CARE CURRENT STATUS: HCPCS | Mod: CI,NTX

## 2018-10-22 PROCEDURE — 93306 TTE W/DOPPLER COMPLETE: CPT | Mod: NTX

## 2018-10-22 PROCEDURE — 93016 CV STRESS TEST SUPVJ ONLY: CPT | Mod: NTX,,, | Performed by: INTERNAL MEDICINE

## 2018-10-22 PROCEDURE — G8979 MOBILITY GOAL STATUS: HCPCS | Mod: CH,NTX

## 2018-10-22 PROCEDURE — 93005 ELECTROCARDIOGRAM TRACING: CPT | Mod: NTX,59

## 2018-10-22 PROCEDURE — G8978 MOBILITY CURRENT STATUS: HCPCS | Mod: CH,NTX

## 2018-10-22 PROCEDURE — 90471 IMMUNIZATION ADMIN: CPT | Mod: NTX | Performed by: FAMILY MEDICINE

## 2018-10-22 RX ORDER — FUROSEMIDE 20 MG/1
60 TABLET ORAL 3 TIMES DAILY
Qty: 270 TABLET | Refills: 2 | Status: ON HOLD | OUTPATIENT
Start: 2018-10-22 | End: 2019-01-10

## 2018-10-22 RX ADMIN — ISOSORBIDE DINITRATE 40 MG: 10 TABLET ORAL at 04:10

## 2018-10-22 RX ADMIN — ATORVASTATIN CALCIUM 80 MG: 40 TABLET, FILM COATED ORAL at 10:10

## 2018-10-22 RX ADMIN — ASPIRIN 81 MG 81 MG: 81 TABLET ORAL at 10:10

## 2018-10-22 RX ADMIN — INFLUENZA A VIRUS A/MICHIGAN/45/2015 X-275 (H1N1) ANTIGEN (FORMALDEHYDE INACTIVATED), INFLUENZA A VIRUS A/SINGAPORE/INFIMH-16-0019/2016 IVR-186 (H3N2) ANTIGEN (FORMALDEHYDE INACTIVATED), INFLUENZA B VIRUS B/PHUKET/3073/2013 ANTIGEN (FORMALDEHYDE INACTIVATED), AND INFLUENZA B VIRUS B/MARYLAND/15/2016 BX-69A ANTIGEN (FORMALDEHYDE INACTIVATED) 0.5 ML: 15; 15; 15; 15 INJECTION, SUSPENSION INTRAMUSCULAR at 06:10

## 2018-10-22 RX ADMIN — LOSARTAN POTASSIUM 25 MG: 25 TABLET ORAL at 10:10

## 2018-10-22 RX ADMIN — SPIRONOLACTONE 25 MG: 25 TABLET ORAL at 10:10

## 2018-10-22 RX ADMIN — ISOSORBIDE DINITRATE 40 MG: 10 TABLET ORAL at 10:10

## 2018-10-22 RX ADMIN — CLOPIDOGREL BISULFATE 75 MG: 75 TABLET ORAL at 10:10

## 2018-10-22 RX ADMIN — FUROSEMIDE 60 MG: 20 TABLET ORAL at 10:10

## 2018-10-22 RX ADMIN — FUROSEMIDE 60 MG: 20 TABLET ORAL at 04:10

## 2018-10-22 RX ADMIN — LIDOCAINE 1 PATCH: 50 PATCH TOPICAL at 06:10

## 2018-10-22 RX ADMIN — PANTOPRAZOLE SODIUM 40 MG: 40 TABLET, DELAYED RELEASE ORAL at 10:10

## 2018-10-22 RX ADMIN — SENNOSIDES AND DOCUSATE SODIUM 1 TABLET: 8.6; 5 TABLET ORAL at 10:10

## 2018-10-22 RX ADMIN — CARVEDILOL 25 MG: 25 TABLET, FILM COATED ORAL at 10:10

## 2018-10-22 RX ADMIN — ACETAMINOPHEN 650 MG: 325 TABLET ORAL at 01:10

## 2018-10-22 NOTE — CONSULTS
[facility]  Cardiology  Consult Note    Patient Name: Hafsa Hawley  MRN: [id number]  Admission Date: 10/21/2018  Hospital Length of Stay: 0 days  Code Status: Full Code   Attending Provider: Haylee Dunne MD   Consulting Provider: Juan Cheema NP  Primary Care Physician: Aster Paz DO  Principal Problem:Chest pain    Patient information was obtained from patient, past medical records and ER records.     Inpatient consult to [facility]  Consult performed by: Juan Cheema NP  Consult ordered by: Yefri Ramirez MD        Subjective:     Chief Complaint:  Chest Pressure     HPI:   Hafsa Hawley is a 53 y.o. female with  NICM EF of 20%, HTN, COPD, anemia, paroxysmal Afib, paroxysmal SVT, s/p ICD placement, MELISSA, GERD, and anxiety. Patient presented to the ED with chest pressure with SOB. Patient also noted abdominal distension which has resolved.  She denied nausea, vomiting, and syncope. The pain had no radiation and it was alleviated with nitro. She also notes having a seperate sharp pain in her left chest under her left breast that is different from her retrosternal pain. She reports that her chest pain is sometimes associated with her anxiety but she also has chest pain intermittently at random times not associated with stress. Dr. Cortez is her primary cardiologist. [facility] in 11/2015 noted normal coronary arteries. Her troponin is elevated at baseline.       Past Medical History:   Diagnosis Date    Anemia     Anticoagulant long-term use     Arthritis     Atrial fibrillation     Congestive heart failure     COPD (chronic obstructive pulmonary disease)     Encounter for blood transfusion     GERD (gastroesophageal reflux disease)     Hypertension     Pheochromocytoma, malignant     Right kidney mass     Sleep apnea     Thyroid disease        Past Surgical History:   Procedure Laterality Date    APPENDECTOMY      CARDIAC DEFIBRILLATOR PLACEMENT Left  "12/2016    EYE SURGERY      due to running tears    FRACTURE SURGERY Left     hand 5th digit    HEART CATH-RIGHT Right 7/22/2016    [facility] C[facility] LAB    HYSTERECTOMY      INSERTION-ICD-SINGLE N/A 12/2/2016    [facility]    KNEE SURGERY Left 2016    hematoma    PROCEDURE PROLAPSE HEMORRHOID (PPH) N/A 3/7/2017    [facility] by [facility]GE [facility]man [facility] at [facility] OR    Right heart cath Right 2/6/2018    Performed by Benson Cortez MD at [facility] CATH LAB       Review of patient's allergies indicates:   Allergen Reactions    Penicillins Hives    Percocet [oxycodone-acetaminophen] Other (See Comments)     Nausea, Dizziness, Anxiety.  "I don't like how it makes me feel."   Given Hydromorphone 0.5mg IVP  Without problems.    Diovan hct [valsartan-hydrochlorothiazide] Other (See Comments)     Causes coughing    Tramadol Nausea And Vomiting       No current facility-administered medications on file prior to encounter.      Current Outpatient Medications on File Prior to Encounter   Medication Sig    albuterol-ipratropium 2.5mg-0.5mg/3mL (DUO-NEB) 0.5 mg-3 mg(2.5 mg base)/3 mL nebulizer solution Take 1 mL by nebulization every 6 (six) hours as needed for Wheezing or Shortness of Breath.    ALPRAZolam (XANAX) 1 MG tablet Take 1 mg by mouth nightly as needed for Anxiety.    atorvastatin (LIPITOR) 40 MG tablet Take 1 tablet (40 mg total) by mouth once daily.    b complex vitamins tablet Take 1 tablet by mouth once daily.    carvedilol (COREG) 25 MG tablet Take 1 tablet by mouth 2 (two) times daily.    cyanocobalamin, vitamin B-12, 50 mcg tablet Take 5 mcg by mouth once daily.    ELIQUIS 5 mg Tab TAKE 1 TABLET BY MOUTH TWICE DAILY    fluticasone-vilanterol (BREO ELLIPTA) 100-25 mcg/dose diskus inhaler Inhale 1 puff into the lungs once daily. Controller    isosorbide dinitrate (ISORDIL) 20 MG tablet Take 2 tablets (40 mg total) by mouth 3 (three) " times daily. Please check that this is what you are already taking at home.    losartan (COZAAR) 25 MG tablet Take 1 tablet (25 mg total) by mouth once daily.    NITROSTAT 0.4 mg SL tablet Take 2.5 tablets (1 mg total) by mouth every 5 (five) minutes as needed for Chest pain. No more than 3 tablets in 15 minutes.    nystatin (MYCOSTATIN) powder Apply topically 2 (two) times daily.    pantoprazole (PROTONIX) 40 MG tablet Take 1 tablet by mouth once daily.    potassium chloride (KLOR-CON) 10 MEQ TbSR Take 1 tablet (10 mEq total) by mouth once daily.    spironolactone (ALDACTONE) 25 MG tablet Take 1 tablet (25 mg total) by mouth once daily.    walker Misc 1 Device by Misc.(Non-Drug; Combo Route) route as needed.    amitriptyline (ELAVIL) 25 MG tablet Take 1 tablet (25 mg total) by mouth every evening.    aspirin (ECOTRIN) 81 MG EC tablet Take 81 mg by mouth once daily.    furosemide (LASIX) 20 MG tablet Take 3 tablets (60 mg total) by mouth 3 (three) times daily.    polyethylene glycol (GOLYTELY,NULYTELY) 236-22.74-6.74 -5.86 gram suspension Use as directed    VENTOLIN HFA 90 mcg/actuation inhaler Inhale 2 puffs into the lungs 2 (two) times daily as needed.      Family History     Problem Relation (Age of Onset)    Cancer Mother    Cirrhosis Brother    Diabetes Brother    Heart disease Father, Sister, Brother, Sister, Brother    Hypertension Father, Brother        Tobacco Use    Smoking status: Never Smoker    Smokeless tobacco: Never Used   Substance and Sexual Activity    Alcohol use: No     Comment: up to 1 yr ago drank 2-3 drinks on occasion but sporadic    Drug use: No    Sexual activity: Yes     Partners: Male     Review of Systems   Constitution: Negative for diaphoresis, weight gain and weight loss.   HENT: Negative.    Eyes: Negative.    Cardiovascular: Positive for chest pain and dyspnea on exertion. Negative for leg swelling, near-syncope, orthopnea, palpitations, paroxysmal nocturnal  dyspnea and syncope.   Respiratory: Positive for shortness of breath. Negative for cough and sputum production.    Endocrine: Negative.    Hematologic/Lymphatic: Negative.    Skin: Negative.    Musculoskeletal: Negative.    Gastrointestinal: Positive for bloating. Negative for nausea and vomiting.   Genitourinary: Negative.    Neurological: Negative.  Negative for dizziness and light-headedness.   Psychiatric/Behavioral: Negative.    Allergic/Immunologic: Negative.      Objective:     Vital Signs (Most Recent):  Temp: 97.3 °F (36.3 °C) (10/22/18 1033)  Pulse: 67 (10/22/18 1033)  Resp: 18 (10/22/18 1033)  BP: (!) 162/92 (10/22/18 1033)  SpO2: 96 % (10/22/18 1033) Vital Signs (24h Range):  Temp:  [96.1 °F (35.6 °C)-97.3 °F (36.3 °C)] 97.3 °F (36.3 °C)  Pulse:  [59-67] 67  Resp:  [16-20] 18  SpO2:  [94 %-99 %] 96 %  BP: (119-162)/(62-92) 162/92     Weight: 89.7 kg (197 lb 12 oz)  Body mass index is 31.92 kg/m².    SpO2: 96 %  O2 Device (Oxygen Therapy): room air      Intake/Output Summary (Last 24 hours) at 10/22/2018 1058  Last data filed at 10/22/2018 1027  Gross per 24 hour   Intake 320 ml   Output 1600 ml   Net -1280 ml       Lines/Drains/Airways     Peripheral Intravenous Line                 Peripheral IV - Single Lumen 10/21/18 1400 Forearm less than 1 day                Physical Exam   Constitutional: She is oriented to person, place, and time. She appears well-developed and well-nourished. No distress.   HENT:   Head: Normocephalic and atraumatic.   Eyes: Right eye exhibits no discharge. Left eye exhibits no discharge.   Neck: No JVD present.   Cardiovascular: Normal rate and regular rhythm. Exam reveals no gallop and no friction rub.   Murmur heard.  Pulmonary/Chest: Effort normal. She has decreased breath sounds. She has no rales.   Abdominal: Soft. Bowel sounds are normal.   Musculoskeletal: She exhibits no edema.   Neurological: She is alert and oriented to person, place, and time.   Skin: Skin is warm and  dry. She is not diaphoretic.   Psychiatric: She has a normal mood and affect. Her behavior is normal. Judgment and thought content normal.       Significant Labs:   BMP:   Recent Labs   Lab 10/21/18  1131 10/22/18  0207    97    142   K 3.9 3.9    104   CO2 23 28   BUN 27* 26*   CREATININE 1.0 1.2   CALCIUM 9.5 9.7   MG 1.8  --    , CMP   Recent Labs   Lab 10/21/18  1131 10/22/18  0207    142   K 3.9 3.9    104   CO2 23 28    97   BUN 27* 26*   CREATININE 1.0 1.2   CALCIUM 9.5 9.7   PROT 7.4 7.3   ALBUMIN 3.7 3.7   BILITOT 3.0* 3.3*   ALKPHOS 67 65   AST 19 18   ALT 12 11   ANIONGAP 11 10   ESTGFRAFRICA >60 60   EGFRNONAA >60 52*   , CBC   Recent Labs   Lab 10/21/18  1131   WBC 4.46   HGB 9.1*   HCT 31.2*      , INR   Recent Labs   Lab 10/21/18  1137   INR 1.0   , Lipid Panel   Recent Labs   Lab 10/21/18  1131   CHOL 136   HDL 59   LDLCALC 62.0*   TRIG 75   CHOLHDL 43.4   , Troponin   Recent Labs   Lab 10/21/18  1406 10/21/18  1608 10/22/18  0208   TROPONINI 0.743* 0.711* 0.697*    and All pertinent lab results from the last 24 hours have been reviewed.    Significant Imaging: Echocardiogram:   2D echo with color flow doppler:   Results for orders placed or performed during the hospital encounter of 10/21/18   2D echo with color flow doppler   Result Value Ref Range    Calculated EF 20 (A) 55 - 65    Mitral Valve Regurgitation MILD TO MODERATE     Diastolic Dysfunction Yes (A)     Est. PA Systolic Pressure 22.01     Mitral Valve Mobility NORMAL     Tricuspid Valve Regurgitation TRIVIAL      Assessment and Plan:     * Chest pain    11/2015 [facility] normal coronaries   Nuc stress 2017 negative for ischemia     EKG without acute ischemic changes  Trop chronically elevated 0.7    Will obtain stress test for completeness, low suspicion for ischemic chest pain given previous cardiac workup    Continue BB, ARB, statin, asa       NICM (nonischemic cardiomyopathy)    LVEF 20% with DD,  mild MR, and moderate pulm regurgitation     S/p ICD    ?mild volume overload on admission, euvolemic on exam this AM    Continue BB, ARB, aldactone, isordil, Lasix    Repeat echo pending         Paroxysmal atrial fibrillation    Currently in SR  Continue BB, anticipate Eliquis resumption after stress test       Essential hypertension    Goal BP <130/80  Continue BB, ARB, isordil, aldactone          VTE Risk Mitigation (From admission, onward)        Ordered     Place sequential compression device  Until discontinued      10/21/18 4441          Thank you for your consult. I will follow-up with patient. Please contact us if you have any additional questions.    Juan Cheema, LITA  Cardiology   [facility]

## 2018-10-22 NOTE — ASSESSMENT & PLAN NOTE
LVEF 20% with DD, mild MR, and moderate pulm regurgitation     S/p ICD    ?mild volume overload on admission, euvolemic on exam this AM    Continue BB, ARB, aldactone, isordil, Lasix    Repeat echo pending

## 2018-10-22 NOTE — ASSESSMENT & PLAN NOTE
11/2015 [facility] normal coronaries   Nuc stress 2017 negative for ischemia     EKG without acute ischemic changes  Trop chronically elevated 0.7    Will obtain stress test for completeness, low suspicion for ischemic chest pain given previous cardiac workup    Continue BB, ARB, statin, asa

## 2018-10-22 NOTE — PT/OT/SLP EVAL
Physical Therapy Evaluation and Discharge Note    Patient Name:  Hafsa Hawley   MRN:  [id number]    Recommendations:     Discharge Recommendations:  home   Discharge Equipment Recommendations: shower chair   Barriers to discharge: None    Assessment:     Hafsa Hawley is a 53 y.o. female admitted with a medical diagnosis of Chest pain. At this time, patient is functioning at their prior level of function and does not require further acute PT services.     Recent Surgery: * No surgery found *      Plan:     During this hospitalization, patient does not require further acute PT services.  Please re-consult if situation changes.      Subjective     Chief Complaint: decreased activity tolerance  Patient/Family Comments/goals: pt reports she feels at her baseline  Pain/Comfort:  · Pain Rating 1: 0/10  · Pain Rating Post-Intervention 1: 0/10    Patients cultural, spiritual, Sikhism conflicts given the current situation: none reported    Living Environment:  Pt lives with her granddaughter (12 years old) in a [facility] with no NIRAJ and tub/shower combo.  Prior to admission, patients level of function was mod I with PRN use of rollator for mobility pending fatigue level, mod I with ADLs, and drives when she feels well enough. Pt reports her granddaughter is self-sufficient. Pt reports she is on the heart transplant list.  Equipment used at home: rollator.  DME owned (not currently used): none.  Upon discharge, patient will have assistance from her granddaughter.    Objective:     Communicated with LAURIE Hyatt prior to session.  Patient found supine with HOB elevated upon PT entry to room found with: telemetry     General Precautions: Standard, fall   Orthopedic Precautions:N/A   Braces: N/A     Exams:  · Gross Motor Coordination:  WFL  · Postural Exam:  Patient presented with the following abnormalities:    · -       No postural abnormalities identified  · Sensation:    · -       Impaired  reports numbness to R 1-3rd digits  (able to localize), and pain to R 4th/5th digits  · Skin Integrity/Edema:      · -       Skin integrity: Visible skin intact  · RLE ROM: WFL  · RLE Strength: WFL  · LLE ROM: WFL  · LLE Strength: WFL    Functional Mobility:  · Bed Mobility:     · Supine to Sit: modified independence  · Sit to Supine: modified independence  · Transfers:     · Sit to Stand:  independence with no AD  · Gait: 200 ft with no AD mod I over level surface with no instability or concerns    AM-PAC 6 CLICK MOBILITY  Total Score:24       Therapeutic Activities and Exercises:  OT provided pt with energy conservation handout and educated pt on energy conservation techniques.     Patient left HOB elevated with all lines intact, call button in reach, bed alarm on and RN notified.    GOALS:   Multidisciplinary Problems     Physical Therapy Goals     Not on file          Multidisciplinary Problems (Resolved)        Problem: Physical Therapy Goal    Goal Priority Disciplines Outcome Goal Variances Interventions   Physical Therapy Goal   (Resolved)     PT, PT/OT Outcome(s) achieved                     History:     Past Medical History:   Diagnosis Date    Anemia     Anticoagulant long-term use     Arthritis     Atrial fibrillation     Congestive heart failure     COPD (chronic obstructive pulmonary disease)     Encounter for blood transfusion     GERD (gastroesophageal reflux disease)     Hypertension     Pheochromocytoma, malignant     Right kidney mass     Sleep apnea     Thyroid disease        Past Surgical History:   Procedure Laterality Date    APPENDECTOMY      CARDIAC DEFIBRILLATOR PLACEMENT Left 12/2016    EYE SURGERY      due to running tears    FRACTURE SURGERY Left     hand 5th digit    HEART CATH-RIGHT Right 7/22/2016    [facility]    HYSTERECTOMY      INSERTION-ICD-SINGLE N/A 12/2/2016    [facility] Bob [facility] at [facility] CATH [facility]    KNEE SURGERY Left 2016    hematoma     PROCEDURE PROLAPSE HEMORRHOID (PPH) N/A 3/7/2017    [facility] OR    Right heart cath Right 2/6/2018    Performed by Benson Cortez MD at [facility] CATH LAB       Clinical Decision Making:     History  Co-morbidities and personal factors that may impact the plan of care Examination  Body Structures and Functions, activity limitations and participation restrictions that may impact the plan of care Clinical Presentation   Decision Making/ Complexity Score   Co-morbidities:   [] Time since onset of injury / illness / exacerbation  [] Status of current condition  []Patient's cognitive status and safety concerns    [x] Multiple Medical Problems (see med hx)  Personal Factors:   [] Patient's age  [] Prior Level of function   [] Patient's home situation (environment and family support)  [] Patient's level of motivation  [] Expected progression of patient      HISTORY:(criteria)    [] 24310 - no personal factors/history    [x] 85776 - has 1-2 personal factor/comorbidity     [] 10943 - has >3 personal factor/comorbidity     Body Regions:  [] Objective examination findings  [] Head     []  Neck  [] Trunk   [] Upper Extremity  [] Lower Extremity    Body Systems:  [] For communication ability, affect, cognition, language, and learning style: the assessment of the ability to make needs known, consciousness, orientation (person, place, and time), expected emotional /behavioral responses, and learning preferences (eg, learning barriers, education  needs)  [] For the neuromuscular system: a general assessment of gross coordinated movement (eg, balance, gait, locomotion, transfers, and transitions) and motor function  (motor control and motor learning)  [] For the musculoskeletal system: the assessment of gross symmetry, gross range of motion, gross strength, height, and weight  [] For the integumentary system: the assessment of pliability(texture), presence of scar formation, skin color, and skin  integrity  [] For cardiovascular/pulmonary system: the assessment of heart rate, respiratory rate, blood pressure, and edema     Activity limitations:    [] Patient's cognitive status and saf ety concerns          [] Status of current condition      [] Weight bearing restriction  [] Cardiopulmunary Restriction    Participation Restrictions:   [] Goals and goal agreement with the patient     [] Rehab potential (prognosis) and probable outcome      Examination of Body System: (criteria)    [x] 29763 - addressing 1-2 elements    [] 13784 - addressing a total of 3 or more elements     [] 32839 -  Addressing a total of 4 or more elements         Clinical Presentation: (criteria)  Stable - 18851     On examination of body system using standardized tests and measures patient presents with 1-2 elements from any of the following: body structures and functions, activity limitations, and/or participation restrictions.  Leading to a clinical presentation that is considered stable and/or uncomplicated                              Clinical Decision Making  (Eval Complexity):  Low- 75667     Time Tracking:     PT Received On: 10/22/18  PT Start Time: 0954     PT Stop Time: 1014  PT Total Time (min): 20 min     Billable Minutes: Evaluation 20      Ashley Vitale, PT  10/22/2018

## 2018-10-22 NOTE — SUBJECTIVE & OBJECTIVE
"Past Medical History:   Diagnosis Date    Anemia     Anticoagulant long-term use     Arthritis     Atrial fibrillation     Congestive heart failure     COPD (chronic obstructive pulmonary disease)     Encounter for blood transfusion     GERD (gastroesophageal reflux disease)     Hypertension     Pheochromocytoma, malignant     Right kidney mass     Sleep apnea     Thyroid disease        Past Surgical History:   Procedure Laterality Date    APPENDECTOMY      CARDIAC DEFIBRILLATOR PLACEMENT Left 12/2016    EYE SURGERY      due to running tears    FRACTURE SURGERY Left     hand 5th digit    HEART CATH-RIGHT Right 7/22/2016    [facility] by [facility]h [facility] at [facility] CATH LAB    HYSTERECTOMY      INSERTION-ICD-SINGLE N/A 12/2/2016    Performed by [facility] at [facility] CATH LAB    KNEE SURGERY Left 2016    hematoma    PROCEDURE PROLAPSE HEMORRHOID (PPH) N/A 3/7/2017    Performed by GEORGE Rothman [facility] at [facility] OR    Right heart cath Right 2/6/2018    Performed by Benson Cortez MD at [facility] CATH LAB       Review of patient's allergies indicates:   Allergen Reactions    Penicillins Hives    Percocet [oxycodone-acetaminophen] Other (See Comments)     Nausea, Dizziness, Anxiety.  "I don't like how it makes me feel."   Given Hydromorphone 0.5mg IVP  Without problems.    Diovan hct [valsartan-hydrochlorothiazide] Other (See Comments)     Causes coughing    Tramadol Nausea And Vomiting       No current facility-administered medications on file prior to encounter.      Current Outpatient Medications on File Prior to Encounter   Medication Sig    albuterol-ipratropium 2.5mg-0.5mg/3mL (DUO-NEB) 0.5 mg-3 mg(2.5 mg base)/3 mL nebulizer solution Take 1 mL by nebulization every 6 (six) hours as needed for Wheezing or Shortness of Breath.    ALPRAZolam (XANAX) 1 MG tablet Take 1 mg by mouth nightly as needed for Anxiety.    atorvastatin (LIPITOR) 40 MG tablet Take 1 tablet (40 mg total) by mouth " once daily.    b complex vitamins tablet Take 1 tablet by mouth once daily.    carvedilol (COREG) 25 MG tablet Take 1 tablet by mouth 2 (two) times daily.    cyanocobalamin, vitamin B-12, 50 mcg tablet Take 5 mcg by mouth once daily.    ELIQUIS 5 mg Tab TAKE 1 TABLET BY MOUTH TWICE DAILY    fluticasone-vilanterol (BREO ELLIPTA) 100-25 mcg/dose diskus inhaler Inhale 1 puff into the lungs once daily. Controller    isosorbide dinitrate (ISORDIL) 20 MG tablet Take 2 tablets (40 mg total) by mouth 3 (three) times daily. Please check that this is what you are already taking at home.    losartan (COZAAR) 25 MG tablet Take 1 tablet (25 mg total) by mouth once daily.    NITROSTAT 0.4 mg SL tablet Take 2.5 tablets (1 mg total) by mouth every 5 (five) minutes as needed for Chest pain. No more than 3 tablets in 15 minutes.    nystatin (MYCOSTATIN) powder Apply topically 2 (two) times daily.    pantoprazole (PROTONIX) 40 MG tablet Take 1 tablet by mouth once daily.    potassium chloride (KLOR-CON) 10 MEQ TbSR Take 1 tablet (10 mEq total) by mouth once daily.    spironolactone (ALDACTONE) 25 MG tablet Take 1 tablet (25 mg total) by mouth once daily.    walker Misc 1 Device by Misc.(Non-Drug; Combo Route) route as needed.    amitriptyline (ELAVIL) 25 MG tablet Take 1 tablet (25 mg total) by mouth every evening.    aspirin (ECOTRIN) 81 MG EC tablet Take 81 mg by mouth once daily.    furosemide (LASIX) 20 MG tablet Take 3 tablets (60 mg total) by mouth 3 (three) times daily.    polyethylene glycol (GOLYTELY,NULYTELY) 236-22.74-6.74 -5.86 gram suspension Use as directed    VENTOLIN HFA 90 mcg/actuation inhaler Inhale 2 puffs into the lungs 2 (two) times daily as needed.      Family History     Problem Relation (Age of Onset)    Cancer Mother    Cirrhosis Brother    Diabetes Brother    Heart disease Father, Sister, Brother, Sister, Brother    Hypertension Father, Brother        Tobacco Use    Smoking status: Never  Smoker    Smokeless tobacco: Never Used   Substance and Sexual Activity    Alcohol use: No     Comment: up to 1 yr ago drank 2-3 drinks on occasion but sporadic    Drug use: No    Sexual activity: Yes     Partners: Male     Review of Systems   Constitution: Negative for diaphoresis, weight gain and weight loss.   HENT: Negative.    Eyes: Negative.    Cardiovascular: Positive for chest pain and dyspnea on exertion. Negative for leg swelling, near-syncope, orthopnea, palpitations, paroxysmal nocturnal dyspnea and syncope.   Respiratory: Positive for shortness of breath. Negative for cough and sputum production.    Endocrine: Negative.    Hematologic/Lymphatic: Negative.    Skin: Negative.    Musculoskeletal: Negative.    Gastrointestinal: Positive for bloating. Negative for nausea and vomiting.   Genitourinary: Negative.    Neurological: Negative.  Negative for dizziness and light-headedness.   Psychiatric/Behavioral: Negative.    Allergic/Immunologic: Negative.      Objective:     Vital Signs (Most Recent):  Temp: 97.3 °F (36.3 °C) (10/22/18 1033)  Pulse: 67 (10/22/18 1033)  Resp: 18 (10/22/18 1033)  BP: (!) 162/92 (10/22/18 1033)  SpO2: 96 % (10/22/18 1033) Vital Signs (24h Range):  Temp:  [96.1 °F (35.6 °C)-97.3 °F (36.3 °C)] 97.3 °F (36.3 °C)  Pulse:  [59-67] 67  Resp:  [16-20] 18  SpO2:  [94 %-99 %] 96 %  BP: (119-162)/(62-92) 162/92     Weight: 89.7 kg (197 lb 12 oz)  Body mass index is 31.92 kg/m².    SpO2: 96 %  O2 Device (Oxygen Therapy): room air      Intake/Output Summary (Last 24 hours) at 10/22/2018 1058  Last data filed at 10/22/2018 1027  Gross per 24 hour   Intake 320 ml   Output 1600 ml   Net -1280 ml       Lines/Drains/Airways     Peripheral Intravenous Line                 Peripheral IV - Single Lumen 10/21/18 1400 Forearm less than 1 day                Physical Exam   Constitutional: She is oriented to person, place, and time. She appears well-developed and well-nourished. No distress.   HENT:    Head: Normocephalic and atraumatic.   Eyes: Right eye exhibits no discharge. Left eye exhibits no discharge.   Neck: No JVD present.   Cardiovascular: Normal rate and regular rhythm. Exam reveals no gallop and no friction rub.   Murmur heard.  Pulmonary/Chest: Effort normal. She has decreased breath sounds. She has no rales.   Abdominal: Soft. Bowel sounds are normal.   Musculoskeletal: She exhibits no edema.   Neurological: She is alert and oriented to person, place, and time.   Skin: Skin is warm and dry. She is not diaphoretic.   Psychiatric: She has a normal mood and affect. Her behavior is normal. Judgment and thought content normal.       Significant Labs:   BMP:   Recent Labs   Lab 10/21/18  1131 10/22/18  0207    97    142   K 3.9 3.9    104   CO2 23 28   BUN 27* 26*   CREATININE 1.0 1.2   CALCIUM 9.5 9.7   MG 1.8  --    , CMP   Recent Labs   Lab 10/21/18  1131 10/22/18  0207    142   K 3.9 3.9    104   CO2 23 28    97   BUN 27* 26*   CREATININE 1.0 1.2   CALCIUM 9.5 9.7   PROT 7.4 7.3   ALBUMIN 3.7 3.7   BILITOT 3.0* 3.3*   ALKPHOS 67 65   AST 19 18   ALT 12 11   ANIONGAP 11 10   ESTGFRAFRICA >60 60   EGFRNONAA >60 52*   , CBC   Recent Labs   Lab 10/21/18  1131   WBC 4.46   HGB 9.1*   HCT 31.2*      , INR   Recent Labs   Lab 10/21/18  1137   INR 1.0   , Lipid Panel   Recent Labs   Lab 10/21/18  1131   CHOL 136   HDL 59   LDLCALC 62.0*   TRIG 75   CHOLHDL 43.4   , Troponin   Recent Labs   Lab 10/21/18  1406 10/21/18  1608 10/22/18  0208   TROPONINI 0.743* 0.711* 0.697*    and All pertinent lab results from the last 24 hours have been reviewed.    Significant Imaging: Echocardiogram:   2D echo with color flow doppler:   Results for orders placed or performed during the hospital encounter of 10/21/18   2D echo with color flow doppler   Result Value Ref Range    Calculated EF 20 (A) 55 - 65    Mitral Valve Regurgitation MILD TO MODERATE     Diastolic Dysfunction  Yes (A)     Est. PA Systolic Pressure 22.01     Mitral Valve Mobility NORMAL     Tricuspid Valve Regurgitation TRIVIAL

## 2018-10-22 NOTE — PLAN OF CARE
Patient is discharged to home. No needs ordered for discharge. Follow-up with PCP previously scheduled.    No changes in medication noted.    Future Appointments   Date Time Provider Department Center   10/23/2018 10:40 AM Aster Paz DO [facility] LSUFMRE [facility]   10/24/2018 12:30 PM [facility] [id number] [facility] RAD IR [facility]   10/29/2018  8:00 AM HOME MONITOR DEVICE CHECK, [facility] NOM ARRHYTH [facility]   11/7/2018  8:00 AM Laura Lindo NP [facility] HEPAT [facility]   11/27/2018  1:00 PM LAB, APPOINTMENT [place] [facility] LAB VNP [facility]   11/27/2018  2:30 PM Beau Rodriguez MD [facility] HEARTTX [facility]   1/28/2019  8:00 AM HOME MONITOR DEVICE CHECK, [facility] ARRHYTH [facility]   6/28/2019 10:00 AM [facility] CT1 GE VCT64 SLICE CT LIMIT 450 LBS [facility] CT SCAN [facility]   7/1/2019 10:30 AM Missael Landry MD [facility] TUMOR [facility]        10/22/18 1647   Final Note   Assessment Type Final Discharge Note   Discharge Disposition Home   What phone number can be called within the next 1-3 days to see how you are doing after discharge? [phone]   Hospital Follow Up  Appt(s) scheduled? Yes   Right Care Referral Info   Post Acute Recommendation No Care     Luciana Miguel RN  Transition Navigator  [phone]

## 2018-10-22 NOTE — HPI
Hafsa Hawley is a 53 y.o. female with NICM EF of 20%, HTN, COPD, anemia, paroxysmal Afib, paroxysmal SVT, s/p ICD placement, MELISSA, GERD, and anxiety. Patient presented to the ED with chest pressure with SOB. Patient also noted abdominal distension which has resolved.  She denied nausea, vomiting, and syncope. The pain had no radiation and it was alleviated with nitro. She also notes having a seperate sharp pain in her left chest under her left breast that is different from her retrosternal pain. She reports that her chest pain is sometimes associated with her anxiety but she also has chest pain intermittently at random times not associated with stress. Dr. Cortez is her primary cardiologist. LHC in 11/2015 noted normal coronary arteries. Her troponin is elevated at baseline.

## 2018-10-22 NOTE — PLAN OF CARE
Problem: Patient Care Overview  Goal: Plan of Care Review  Outcome: Ongoing (interventions implemented as appropriate)  Plan of care reviewed with patient, understanding verbalized.  Patient refused BiPAP. Patient remains sinus rhythm on tele. Bed alarm on, call light within reach, fall precautions in place. Will continue to monitor.

## 2018-10-22 NOTE — PLAN OF CARE
Problem: Occupational Therapy Goal  Goal: Occupational Therapy Goal  Outcome: Outcome(s) achieved Date Met: 10/22/18  Pt performing at baseline for ADLs and functional mobility at this time. No reports of SOB with ambulation and/or ADLs. Pt educated on home safety and use of DME. Given energy conservation handout for home use as well. Pt with no further OT needs at this time. Highly recommend shower chair for home use for increased safety with bathing. D/c OT

## 2018-10-22 NOTE — DISCHARGE SUMMARY
".[facility]  Discharge Summary      Admit Date: 10/21/2018    Discharge Date and Time:  10/22/2018 2:49 PM    Attending Physician: Haylee Dunne MD     Discharge Physician:: Yefri Ramirez MD     Reason for Admission: A 53 year old -American female with PMH of HFrEF (25% 8/28/18), HTN, COPD, anemia, paroxysmal SVT (s/p ICD placement), NSTEMI, MELISSA, GERD, and severe anxiety presented to the ED with chief complaint of chest pain. The patient described intermittent chest tightness/pain that felt "pressure-like", localized the pain to the retrosternal chest, described the duration as pain that lasted for a few minutes, was alleviated with nitro, and did not radiate anywhere. She presented with SOB and diaphoresis, and denied nausea and vomiting at time of presentation. She did have tenderness to palpation over her chest wall. She does have severe anxiety and notes that arguments with her son worsened her chest pain. She noted an association of her chest pain with stress. Initial troponin was 0.655; troponin peaked at 0.743, and her the next two troponin values trended down to 0.711 and 0.697. Of note the patient has history of chronically elevated troponin values around this range. Her CXR was unremarkable, and her EKG showed no acute ischemic changes. Cardiology evaluated the patient and there was a low suspicion for ischemic chest pain since she had a LHC in 2015 with normal coronaries and a negative nuclear stress test in 2017. They recommended that she should continue her BB, ARB, aldactone, isordil, lasix, asa, and statin. Her Eliquis was restarted after her stress test. An Echo was ordered on 10/22/18 that revealed a severely depressed left ventricular systolic function (EF 20-25%), normal RV systolic function, and mild to moderate mitral regurgitation. A myocardial perfusion scan showed stable in homegenous uptake with focal partial-thickness myocardial scar and global left " ventricular systolic function with a EF 25% and generalized hypokinesis. She will follow up with her cardiologist. The differential diagnosis included anxiety and costochondritis. She will follow up with her PCP this week and cardiology.     Significant Diagnostic Studies: Cardiac Graphics: ECG:  Echocardiogram:   2D echo with color flow doppler:   Results for orders placed or performed during the hospital encounter of 10/21/18   2D echo with color flow doppler   Result Value Ref Range    Calculated EF 20 (A) 55 - 65    Mitral Valve Regurgitation MILD TO MODERATE     Diastolic Dysfunction Yes (A)     Est. PA Systolic Pressure 22.01     Mitral Valve Mobility NORMAL     Tricuspid Valve Regurgitation TRIVIAL      NM myocardial perfusion scan  Impression       1. Stable inhomogeneous uptake with focal partial-thickness myocardial scar is discussed above.  2. the global left ventricular systolic function is diminished with an LV ejection fraction of 25 % and evidence of LV dilatation. Wall motion is moderate generalized hypokinesis.     Final Diagnoses:    Principal Problem: anxiety and costochondritis Secondary Diagnoses: HTN, PSVT, GERD     Discharged Condition: stable     Disposition: Home or Self Care    Follow Up/Patient Instructions:     Medications:  Reconciled Home Medications:      Medication List      ASK your doctor about these medications    albuterol-ipratropium 2.5 mg-0.5 mg/3 mL nebulizer solution  Commonly known as:  DUO-NEB  Take 1 mL by nebulization every 6 (six) hours as needed for Wheezing or Shortness of Breath.     ALPRAZolam 1 MG tablet  Commonly known as:  XANAX  Take 1 mg by mouth nightly as needed for Anxiety.     amitriptyline 25 MG tablet  Commonly known as:  ELAVIL  Take 1 tablet (25 mg total) by mouth every evening.     aspirin 81 MG EC tablet  Commonly known as:  ECOTRIN  Take 81 mg by mouth once daily.     atorvastatin 40 MG tablet  Commonly known as:  LIPITOR  Take 1 tablet (40 mg total)  by mouth once daily.     b complex vitamins tablet  Take 1 tablet by mouth once daily.     BREO ELLIPTA 100-25 mcg/dose diskus inhaler  Generic drug:  fluticasone-vilanterol  Inhale 1 puff into the lungs once daily. Controller     carvedilol 25 MG tablet  Commonly known as:  COREG  Take 1 tablet by mouth 2 (two) times daily.     cyanocobalamin (vitamin B-12) 50 mcg tablet  Take 5 mcg by mouth once daily.     ELIQUIS 5 mg Tab  Generic drug:  apixaban  TAKE 1 TABLET BY MOUTH TWICE DAILY     furosemide 20 MG tablet  Commonly known as:  LASIX  Take 3 tablets (60 mg total) by mouth 3 (three) times daily.     isosorbide dinitrate 20 MG tablet  Commonly known as:  ISORDIL  Take 2 tablets (40 mg total) by mouth 3 (three) times daily. Please check that this is what you are already taking at home.     losartan 25 MG tablet  Commonly known as:  COZAAR  Take 1 tablet (25 mg total) by mouth once daily.     NITROSTAT 0.4 MG SL tablet  Generic drug:  nitroGLYCERIN  Take 2.5 tablets (1 mg total) by mouth every 5 (five) minutes as needed for Chest pain. No more than 3 tablets in 15 minutes.     NYAMYC powder  Generic drug:  nystatin  Apply topically 2 (two) times daily.     pantoprazole 40 MG tablet  Commonly known as:  PROTONIX  Take 1 tablet by mouth once daily.     polyethylene glycol 236-22.74-6.74 -5.86 gram suspension  Commonly known as:  GoLYTELY,NuLYTELY  Use as directed     potassium chloride 10 MEQ Tbsr  Commonly known as:  KLOR-CON  Take 1 tablet (10 mEq total) by mouth once daily.     spironolactone 25 MG tablet  Commonly known as:  ALDACTONE  Take 1 tablet (25 mg total) by mouth once daily.     VENTOLIN HFA 90 mcg/actuation inhaler  Generic drug:  albuterol  Inhale 2 puffs into the lungs 2 (two) times daily as needed.     walker Misc  1 Device by Misc.(Non-Drug; Combo Route) route as needed.          This discharge summary required > 30 minutes to prepare    Yefri Ramirez MD  10/22/2018

## 2018-10-22 NOTE — NURSING
Vn cued in for rounding. Pt back from stress test. Pt wanted to know if she could eat anything. VN notified pt that MD is awaiting results of stress test and we will follow up with diet. All questions answered. Pt verbalized understanding. No distress noted.

## 2018-10-22 NOTE — PT/OT/SLP EVAL
Occupational Therapy   Evaluation and Discharge Note    Name: Hafsa Hawley  MRN: [id number]  Admitting Diagnosis:  Chest pain      Recommendations:     Discharge Recommendations: home  Discharge Equipment Recommendations:  shower chair  Barriers to discharge:  Decreased caregiver support    History:     Occupational Profile:  Living Environment: Pt lives with 12 year old granddghtr in [facility], no steps to enter, tub/shower combo   Previous level of function: independent/mod I with PRN assist of rollator   Equipment Owned:  rollator  Assistance upon Discharge: reports 12 year old granddghtr to assist     Past Medical History:   Diagnosis Date    Anemia     Anticoagulant long-term use     Arthritis     Atrial fibrillation     Congestive heart failure     COPD (chronic obstructive pulmonary disease)     Encounter for blood transfusion     GERD (gastroesophageal reflux disease)     Hypertension     Pheochromocytoma, malignant     Right kidney mass     Sleep apnea     Thyroid disease        Past Surgical History:   Procedure Laterality Date    APPENDECTOMY      CARDIAC DEFIBRILLATOR PLACEMENT Left 12/2016    EYE SURGERY      due to running tears    FRACTURE SURGERY Left     hand 5th digit    HEART CATH-RIGHT Right 7/22/2016    Performed by [facility] CATH LAB    HYSTERECTOMY      INSERTION-ICD-SINGLE N/A 12/2/2016    [facility] CATH [facility]    KNEE SURGERY Left 2016    hematoma    PROCEDURE PROLAPSE HEMORRHOID (PPH) N/A 3/7/2017    [facility] by GE[facility]GE Rothman [facility] at [facility] OR    Right heart cath Right 2/6/2018    Performed by Benson Cortez MD at [facility] CATH LAB       Subjective     Chief Complaint: no complaints at this time. States she feels better at this time. No SOB and/or chest pain   Patient/Family stated goals: return home; receive heart transplant   Communicated with: nsg prior to session.  Pain/Comfort:  · Pain Rating 1: 0/10    Patients cultural,  spiritual, Adventism conflicts given the current situation:      Objective:     Patient found with:      General Precautions: Standard, fall   Orthopedic Precautions:N/A   Braces: N/A     Occupational Performance:    Bed Mobility:    · Patient completed Scooting/Bridging with modified independence  · Patient completed Supine to Sit with modified independence  · Patient completed Sit to Supine with modified independence    Functional Mobility/Transfers:  · Patient completed Sit <> Stand Transfer with modified independence and supervision  with  no assistive device   · Functional Mobility: supervision without AD; see PT note for distance     Activities of Daily Living:  · Lower Body Dressing: modified independence      Cognitive/Visual Perceptual:  WFL    Physical Exam:  Balance:    -       WFL   Postural examination/scapula alignment:    -       Rounded shoulders  -       Forward head  Skin integrity: Visible skin intact  Edema:  None noted  Sensation:    -       Intact light touch intact, however, reports diminished and occassional shooting pain R Hand at radial distribution   Dominant hand:    -       right  Upper Extremity Range of Motion:    BUE ROM WFL for pt's needs; old R humerus fracture distal   Upper Extremity Strength:   WFL for pt needs   Strength:   good     Patient left supine with all lines intact, call button in reach, bed alarm on and nsg notified    AMPA 6 Click:  AMPAC Total Score: 23    Treatment & Education:  Pt performing functional mobility in room and hallway during session without AD  Educated on home safety, recs of shower chair, and pursed lip breathing; pt also given energy conservation handout and instructed on techniques to perform   Education:    Assessment:     Hafsa Hawley is a 53 y.o. female with a medical diagnosis of Chest pain. At this time, patient is functioning at their prior level of function and does not require further acute OT services. Pt performing at baseline  "for ADLs and functional mobility at this time. No reports of SOB with ambulation and/or ADLs. Pt educated on home safety and use of DME. Given energy conservation handout for home use as well. Pt with no further OT needs at this time. Highly recommend shower chair for home use for increased safety with bathing. D/c OT    Clinical Decision Makin.  OT Low:  "Pt evaluation falls under low complexity for evaluation coding due to performance deficits noted in 1-3 areas as stated above and 0 co-morbities affecting current functional status. Data obtained from problem focused assessments. No modifications or assistance was required for completion of evaluation. Only brief occupational profile and history review completed."     Plan:     During this hospitalization, patient does not require further acute OT services.  Please re-consult if situation changes.    · Plan of Care Reviewed with: patient    This Plan of care has been discussed with the patient who was involved in its development and understands and is in agreement with the identified goals and treatment plan    GOALS:   Multidisciplinary Problems     Occupational Therapy Goals     Not on file          Multidisciplinary Problems (Resolved)        Problem: Occupational Therapy Goal    Goal Priority Disciplines Outcome Interventions   Occupational Therapy Goal   (Resolved)     OT, PT/OT Outcome(s) achieved                    Time Tracking:     OT Date of Treatment: 10/22/18  OT Start Time: 0954  OT Stop Time: 1013  OT Total Time (min): 19 min    Billable Minutes:Evaluation 19    Elzbieta Alvarez OT  10/22/2018    "

## 2018-10-22 NOTE — NURSING
VN cued into patients room for rounding. VN informed pt that VN will be working alongside bedside nurse and PCT throughout the day shift. Pt verbalized understanding that VN is available for any questions and education, and nurse and PCT will continue hourly rounding at bedside. Allotted time given for questions - all questions answered. Will continue to cue in to patients room and monitor.

## 2018-10-22 NOTE — PROGRESS NOTES
Progress Note  [facility]  Admit Date: 10/21/2018   LOS: 0 days   SUBJECTIVE:   Follow-up For: chest pain     Patient seen and examined this AM. She was in bed resting comfortably, in no acute distress. She reports the chest pain has resolved.     ROS     Review of Systems:  Constitutional: no fever or chills  Eyes: no visual changes  Respiratory: no cough, + shortness of breath  Cardiovascular: chest pain resolved  Gastrointestinal: no nausea or vomiting; no abdominal pain   Genitourinary: +urination   Neurological: no new focal weakness    OBJECTIVE:   Vital Signs (Most Recent)  Temp: 96.3 °F (35.7 °C) (10/22/18 0531)  Pulse: 63 (10/22/18 0531)  Resp: 16 (10/22/18 0531)  BP: 130/70 (10/22/18 0531)  SpO2: 96 % (10/22/18 0823)    I & O (Last 24H):    Intake/Output Summary (Last 24 hours) at 10/22/2018 0825  Last data filed at 10/21/2018 1946  Gross per 24 hour   Intake --   Output 1250 ml   Net -1250 ml     Wt Readings from Last 3 Encounters:   10/21/18 89.7 kg (197 lb 12 oz)   10/02/18 88.5 kg (195 lb)   09/25/18 88.4 kg (194 lb 14.2 oz)       Current Diet Order   Procedures    Diet NPO Except for: Medication, Ice Chips, Sips with Medication     Order Specific Question:   Except for     Answer:   Medication     Order Specific Question:   Except for     Answer:   Ice Chips     Order Specific Question:   Except for     Answer:   Sips with Medication        Physical Exam  Physical Exam:  General: well developed, well nourished  Eyes: conjunctivae/corneas clear.   Lungs:  clear to auscultation bilaterally and normal respiratory effort  Cardiovascular: Heart: regular rate and rhythm, S1, S2 normal, no murmur, click, rub or gallop.  Chest Wall: chest pain elicited  with palpation of left chest over pacemaker intact distal pulses   Abdomen/Rectal: Abdomen: soft, non-tender non-distented; bowel sounds normal; no masses,  no organomegaly.    Musculoskeletal: no LE edema or calf tenderness  Neurologic: AO, no  change in mental status  Psych/Behavioral:  Alert and oriented, appropriate affect.        Laboratory Data:  CBC  Recent Labs   Lab 10/21/18  1131   WBC 4.46   RBC 5.52*   HGB 9.1*   HCT 31.2*      MCV 57*   MCH 16.5*   MCHC 29.2*     CMP  Recent Labs   Lab 10/21/18  1131 10/22/18  0207   CALCIUM 9.5 9.7   PROT 7.4 7.3    142   K 3.9 3.9   CO2 23 28    104   BUN 27* 26*   CREATININE 1.0 1.2   ALKPHOS 67 65   ALT 12 11   AST 19 18   BILITOT 3.0* 3.3*     COAGS  Recent Labs   Lab 10/21/18  1137   INR 1.0   APTT 26.8     Recent Labs   Lab 10/22/18  0208   TROPONINI 0.697*       LIPID PANEL  Lab Results   Component Value Date    CHOL 136 10/21/2018     Lab Results   Component Value Date    HDL 59 10/21/2018     Lab Results   Component Value Date    LDLCALC 62.0 (L) 10/21/2018     Lab Results   Component Value Date    TRIG 75 10/21/2018     Lab Results   Component Value Date    CHOLHDL 43.4 10/21/2018       Diagnostic Results:  X-Ray Chest PA And Lateral   Final Result      No acute findings.         Electronically signed by: Roldan Cohen MD   Date:    10/21/2018   Time:    11:29          ASSESSMENT/PLAN:   Hafsa Hawley is a 53 y.o. female    Elevated Troponin   Chest pain on presentation, relieved with nitro   EKG LVT no ST elevation   Troponin is chronically elevated at baseline  CXR unremarkable   Echo ordered  Aspirin   Nitro sublingual prn  Coreg   ARB   Atorvastatin continue   Plavix 300 loaded, 75 daily  Patient on apixaban at home held per cardiology rec   Morphine prn   [facility] contacted and will follow the patient   Echo 8/18 EF 20% and LVH, fu results on repeat echo           CHF   CXR did not show pulmonary edema  Lasix continue  Coreg continue  Spironolactone continue  ARB continue  Clinically euvolemic  Strict input/output     A fib  ICD in place   Apixaban at home held per cards recs   Stable      COPD  Stable on room air  Not on home oxygen   Duo neb  prn  Monitor     HTN  Elevated blood pressure in ED  Lasix continue  Coreg continue  Spironolactone continue  Isosorbide continue  Patient was worked up for pheo and endocrinology thought she did not have it   Drug panel ordered     MELISSA  CPAP-encourage use      Anxiety  SSRI     GERD  PPI continue      Microcytic anemia  Stable 9/31     CODE: FULL   DVT Prophylaxis:home apixaban held per cards recs         Dispo: f/u [facility]         10/22/2018 Kathi Santacruz MD  8:25 AM

## 2018-10-22 NOTE — NURSING
VN cued in to pts room to review DC orders. Pt verbalized understanding of plan of care, medications, follow up appts, and clinical references. Allotted time given for questions - all questions answered. LPN Olena aware. PIV and telemetry removed. Transport requested.

## 2018-10-22 NOTE — DISCHARGE INSTRUCTIONS
Chest Pain, Uncertain Cause (English) View Edit Remove  Cardiac Biomarkers (Blood) (English) View Edit Remove  Troponin (English) View Edit Remove  Nuclear Imaging, Cardiac (Nuclear Stress Test) (English) View Edit Remove  Furosemide tablets (English) View Edit Remove  Heart Failure, Congestive (CHF) (English) View Edit Remove  Heart Failure, Coping with (English) View Edit Remove  Heart Failure, Discharge Instructions for (English) View Edit Remove  Heart Failure, What is (English) View Edit Remove

## 2018-10-22 NOTE — PLAN OF CARE
Patient awake and alert, verified face sheet for name and  and address. Patient has her sister listed on the face sheet , but did not want her name on the board. She is independent and drives. She does not work. Instructed the patient on Observation Level of Care and time frame of 24 hours so anticipate discharge today or tomorrow Am. Patient understands. Discharge plan is for discharge to home.     10/22/18 1205   Discharge Assessment   Assessment Type Discharge Planning Assessment   Confirmed/corrected address and phone number on facesheet? Yes   Assessment information obtained from? Patient   Expected Length of Stay (days) 1   Communicated expected length of stay with patient/caregiver yes   Prior to hospitilization cognitive status: Alert/Oriented   Prior to hospitalization functional status: Independent   Current cognitive status: Alert/Oriented   Current Functional Status: Independent   Lives With alone   Able to Return to Prior Arrangements yes   Is patient able to care for self after discharge? Yes   Patient's perception of discharge disposition home or selfcare   Patient currently being followed by outpatient case management? No   Patient currently receives any other outside agency services? No   Equipment Currently Used at Home none   Do you have any problems affording any of your prescribed medications? No   Is the patient taking medications as prescribed? yes   Does the patient have transportation home? Yes   Transportation Available family or friend will provide   Does the patient receive services at the [facility]? No   Discharge Plan A Home   Discharge Plan B Home   Patient/Family In Agreement With Plan yes

## 2018-10-22 NOTE — PLAN OF CARE
Problem: Physical Therapy Goal  Goal: Physical Therapy Goal  Outcome: Outcome(s) achieved Date Met: 10/22/18  PT evaluation completed, note to follow. Pt functioning at mod I level, is at her PLOF with no IP PT needs at this time. D/C PT. Recommending shower chair.

## 2018-10-23 ENCOUNTER — OFFICE VISIT (OUTPATIENT)
Dept: FAMILY MEDICINE | Facility: HOSPITAL | Age: 53
End: 2018-10-23
Attending: FAMILY MEDICINE
Payer: MEDICAID

## 2018-10-23 VITALS
BODY MASS INDEX: 31.36 KG/M2 | HEIGHT: 66 IN | WEIGHT: 195.13 LBS | HEART RATE: 70 BPM | SYSTOLIC BLOOD PRESSURE: 153 MMHG | DIASTOLIC BLOOD PRESSURE: 89 MMHG

## 2018-10-23 DIAGNOSIS — Z09 HOSPITAL DISCHARGE FOLLOW-UP: ICD-10-CM

## 2018-10-23 DIAGNOSIS — J43.8 OTHER EMPHYSEMA: Primary | Chronic | ICD-10-CM

## 2018-10-23 DIAGNOSIS — I10 ESSENTIAL HYPERTENSION: Chronic | ICD-10-CM

## 2018-10-23 PROBLEM — R06.00 DYSPNEA: Status: RESOLVED | Noted: 2018-08-30 | Resolved: 2018-10-23

## 2018-10-23 PROBLEM — R07.9 CHEST PAIN IN ADULT: Status: RESOLVED | Noted: 2018-10-21 | Resolved: 2018-10-23

## 2018-10-23 PROBLEM — R06.02 SHORTNESS OF BREATH: Status: RESOLVED | Noted: 2017-02-03 | Resolved: 2018-10-23

## 2018-10-23 LAB — STFR SERPL-MCNC: 15.7 MG/L

## 2018-10-23 PROCEDURE — 99214 OFFICE O/P EST MOD 30 MIN: CPT | Performed by: STUDENT IN AN ORGANIZED HEALTH CARE EDUCATION/TRAINING PROGRAM

## 2018-10-23 NOTE — PROGRESS NOTES
"Subjective:       Patient ID: Hafsa Hawley is a 53 y.o. female.    Chief Complaint: COPD management    HPI   53 year old -American female with PMH of HFrEF (25% 8/28/18), HTN, COPD, anemia, paroxysmal SVT (s/p ICD placement), NSTEMI, MELISSA, GERD, and severe anxiety presents for copd management.     OF note: pt was discharged from hospital yesterday after ACS r/o admission.   Per discharge summary, "Cards recommended that she should continue her BB, ARB, aldactone, isordil, lasix, asa, and statin. Her Eliquis was restarted after her stress test. An Echo was ordered on 10/22/18 that revealed a severely depressed left ventricular systolic function (EF 20-25%), normal RV systolic function, and mild to moderate mitral regurgitation. A myocardial perfusion scan showed stable in homegenous uptake with focal partial-thickness myocardial scar and global left ventricular systolic function with a EF 25% and generalized hypokinesis." Chest pain was deemed likely msk vs anxiety.     Today, patient states that she is feeling well and doing well.  She has no complaints. Denies any CP, SOB, N/V/D, headaches, fever or chills. .       PFT  Date performed: 9/29/15  Interpretation:   No obstruction. No restriction. DLCO is moderately reduced uncorrected for Hb.    Review of Systems   Constitutional: Negative for chills and fever.   Respiratory: Negative for chest tightness, shortness of breath and wheezing.    Cardiovascular: Negative for chest pain and leg swelling.   Gastrointestinal: Negative for abdominal pain, constipation, diarrhea, nausea and vomiting.   Musculoskeletal: Negative for arthralgias and myalgias.   Skin: Negative for rash.   Neurological: Negative for dizziness, weakness and headaches.   Psychiatric/Behavioral: Negative for behavioral problems. The patient is not nervous/anxious.        Objective:      Vitals:    10/23/18 0947   BP: (!) 153/89   Pulse: 70     Physical Exam   Constitutional: She is oriented " to person, place, and time. She appears well-developed and well-nourished.   HENT:   Head: Atraumatic.   Eyes: Conjunctivae and EOM are normal. Pupils are equal, round, and reactive to light.   Neck: Normal range of motion. Neck supple. No JVD present. No thyromegaly present.   Cardiovascular: Normal rate, regular rhythm and normal heart sounds. Exam reveals no gallop and no friction rub.   No murmur heard.  Pulmonary/Chest: Effort normal and breath sounds normal. She has no wheezes. She has no rales.   Abdominal: Soft. Bowel sounds are normal. She exhibits no distension. There is no tenderness.   Musculoskeletal: Normal range of motion.   Neurological: She is alert and oriented to person, place, and time. She has normal reflexes.   Skin: Skin is warm and dry.   Psychiatric: She has a normal mood and affect. Her behavior is normal.   Nursing note and vitals reviewed.      Assessment:       1. Other emphysema    2. Essential hypertension    3. Hospital discharge follow-up        Plan:       - patient doing well post hospital stay  - no complaints today and breathing well.   - c/w current medications  - c/w healthy diet/exercise  - f/u with cards as per plan      RTC in 3 months or prn    Aster Paz D.O.  [facility] HO-3  10/24/2018

## 2018-10-24 ENCOUNTER — HOSPITAL ENCOUNTER (OUTPATIENT)
Facility: HOSPITAL | Age: 53
Discharge: HOME | End: 2018-10-24
Attending: RADIOLOGY | Admitting: RADIOLOGY
Payer: MEDICAID

## 2018-10-24 VITALS
RESPIRATION RATE: 18 BRPM | WEIGHT: 195 LBS | BODY MASS INDEX: 31.34 KG/M2 | SYSTOLIC BLOOD PRESSURE: 160 MMHG | HEIGHT: 66 IN | DIASTOLIC BLOOD PRESSURE: 90 MMHG | HEART RATE: 78 BPM | TEMPERATURE: 98 F | OXYGEN SATURATION: 99 %

## 2018-10-24 DIAGNOSIS — I50.43 ACUTE ON CHRONIC COMBINED SYSTOLIC AND DIASTOLIC CONGESTIVE HEART FAILURE: ICD-10-CM

## 2018-10-24 DIAGNOSIS — R17 TOTAL BILIRUBIN, ELEVATED: ICD-10-CM

## 2018-10-24 PROCEDURE — 88313 SPECIAL STAINS GROUP 2: CPT | Mod: 26,NTX,, | Performed by: PATHOLOGY

## 2018-10-24 PROCEDURE — 63600175 PHARM REV CODE 636 W HCPCS: Mod: NTX | Performed by: RADIOLOGY

## 2018-10-24 PROCEDURE — 88307 TISSUE EXAM BY PATHOLOGIST: CPT | Mod: 26,NTX,, | Performed by: PATHOLOGY

## 2018-10-24 PROCEDURE — 88313 SPECIAL STAINS GROUP 2: CPT | Mod: TXP | Performed by: PATHOLOGY

## 2018-10-24 RX ORDER — MIDAZOLAM HYDROCHLORIDE 1 MG/ML
INJECTION INTRAMUSCULAR; INTRAVENOUS CODE/TRAUMA/SEDATION MEDICATION
Status: COMPLETED | OUTPATIENT
Start: 2018-10-24 | End: 2018-10-24

## 2018-10-24 RX ORDER — SODIUM CHLORIDE 9 MG/ML
500 INJECTION, SOLUTION INTRAVENOUS ONCE
Status: DISCONTINUED | OUTPATIENT
Start: 2018-10-24 | End: 2018-10-24 | Stop reason: HOSPADM

## 2018-10-24 RX ORDER — METOPROLOL SUCCINATE 25 MG/1
25 TABLET, EXTENDED RELEASE ORAL DAILY
Status: ON HOLD | COMMUNITY
End: 2018-11-18

## 2018-10-24 RX ORDER — FENTANYL CITRATE 50 UG/ML
INJECTION, SOLUTION INTRAMUSCULAR; INTRAVENOUS CODE/TRAUMA/SEDATION MEDICATION
Status: COMPLETED | OUTPATIENT
Start: 2018-10-24 | End: 2018-10-24

## 2018-10-24 RX ORDER — MIDAZOLAM HYDROCHLORIDE 1 MG/ML
1 INJECTION INTRAMUSCULAR; INTRAVENOUS
Status: DISCONTINUED | OUTPATIENT
Start: 2018-10-24 | End: 2018-10-24 | Stop reason: HOSPADM

## 2018-10-24 RX ORDER — FENTANYL CITRATE 50 UG/ML
50 INJECTION, SOLUTION INTRAMUSCULAR; INTRAVENOUS
Status: DISCONTINUED | OUTPATIENT
Start: 2018-10-24 | End: 2018-10-24 | Stop reason: HOSPADM

## 2018-10-24 RX ADMIN — MIDAZOLAM HYDROCHLORIDE 1 MG: 1 INJECTION, SOLUTION INTRAMUSCULAR; INTRAVENOUS at 05:10

## 2018-10-24 RX ADMIN — MIDAZOLAM HYDROCHLORIDE 0.5 MG: 1 INJECTION, SOLUTION INTRAMUSCULAR; INTRAVENOUS at 05:10

## 2018-10-24 RX ADMIN — FENTANYL CITRATE 25 MCG: 50 INJECTION, SOLUTION INTRAMUSCULAR; INTRAVENOUS at 05:10

## 2018-10-24 RX ADMIN — FENTANYL CITRATE 50 MCG: 50 INJECTION, SOLUTION INTRAMUSCULAR; INTRAVENOUS at 05:10

## 2018-10-24 NOTE — H&P
Radiology History & Physical      SUBJECTIVE:     History of Present Illness:  Hafsa Hawley is a 53 y.o. female who presents for transjugular liver biopsy.    Past Medical History:   Diagnosis Date    Anemia     Anticoagulant long-term use     Arthritis     Atrial fibrillation     Congestive heart failure     COPD (chronic obstructive pulmonary disease)     Encounter for blood transfusion     GERD (gastroesophageal reflux disease)     Hypertension     Pheochromocytoma, malignant     Right kidney mass     Sleep apnea     Thyroid disease      Past Surgical History:   Procedure Laterality Date    APPENDECTOMY      CARDIAC DEFIBRILLATOR PLACEMENT Left 12/2016    EYE SURGERY      due to running tears    FRACTURE SURGERY Left     hand 5th digit    HEART CATH-RIGHT Right 7/22/2016    [facility] CATH LAB    HYSTERECTOMY      INSERTION-ICD-SINGLE N/A 12/2/2016    [facility]    KNEE SURGERY Left 2016    hematoma    PROCEDURE PROLAPSE HEMORRHOID (PPH) N/A 3/7/2017    Performed by GEORGE Rothman [facility] at [facility] OR    Right heart cath Right 2/6/2018    Performed by Benson Cortez MD at [facility] CATH LAB       Home Meds:   Prior to Admission medications    Medication Sig Start Date End Date Taking? Authorizing Provider   atorvastatin (LIPITOR) 40 MG tablet Take 1 tablet (40 mg total) by mouth once daily. 3/19/18 3/19/19 Yes Aster Paz, DO   b complex vitamins tablet Take 1 tablet by mouth once daily.   Yes Historical Provider, MD   carvedilol (COREG) 25 MG tablet Take 1 tablet by mouth 2 (two) times daily.   Yes Historical Provider, MD   cyanocobalamin, vitamin B-12, 50 mcg tablet Take 5 mcg by mouth once daily.   Yes Historical Provider, MD   fluticasone-vilanterol (BREO ELLIPTA) 100-25 mcg/dose diskus inhaler Inhale 1 puff into the lungs once daily. Controller   Yes Historical Provider, MD   furosemide (LASIX) 20 MG tablet Take 3  tablets (60 mg total) by mouth 3 (three) times daily. 10/22/18 11/21/18 Yes Neil Johnston MD   isosorbide dinitrate (ISORDIL) 20 MG tablet Take 2 tablets (40 mg total) by mouth 3 (three) times daily. Please check that this is what you are already taking at home. 9/25/18 9/25/19 Yes CABRERA Tong   losartan (COZAAR) 25 MG tablet Take 1 tablet (25 mg total) by mouth once daily. 8/13/18 8/13/19 Yes Beau Rodriguez MD   metoprolol succinate (TOPROL-XL) 25 MG 24 hr tablet Take 25 mg by mouth once daily.   Yes Historical Provider, MD   pantoprazole (PROTONIX) 40 MG tablet Take 1 tablet by mouth once daily. 1/14/16  Yes Historical Provider, MD   potassium chloride (KLOR-CON) 10 MEQ TbSR Take 1 tablet (10 mEq total) by mouth once daily. 8/21/18  Yes Kathi Burgess MD   spironolactone (ALDACTONE) 25 MG tablet Take 1 tablet (25 mg total) by mouth once daily. 6/13/18 6/13/19 Yes Pallavi Sunkara, MD   VENTOLIN HFA 90 mcg/actuation inhaler Inhale 2 puffs into the lungs 2 (two) times daily as needed.  3/1/17  Yes Historical Provider, MD   albuterol-ipratropium 2.5mg-0.5mg/3mL (DUO-NEB) 0.5 mg-3 mg(2.5 mg base)/3 mL nebulizer solution Take 1 mL by nebulization every 6 (six) hours as needed for Wheezing or Shortness of Breath. 5/10/18   Kathi Burgess MD   ALPRAZolam (XANAX) 1 MG tablet Take 1 mg by mouth nightly as needed for Anxiety.    Historical Provider, MD   ELIQUIS 5 mg Tab TAKE 1 TABLET BY MOUTH TWICE DAILY 11/10/17   LYNN Hernandez, ANP   NITROSTAT 0.4 mg SL tablet Take 2.5 tablets (1 mg total) by mouth every 5 (five) minutes as needed for Chest pain. No more than 3 tablets in 15 minutes. 5/10/18   Kathi Burgess MD   polyethylene glycol (GOLYTELY,NULYTELY) 236-22.74-6.74 -5.86 gram suspension Use as directed 10/2/18   Cheryl Boykin MD   walker Misc 1 Device by Misc.(Non-Drug; Combo Route) route as needed. 12/22/17   Peri Lane MD     Allergies:   Review of patient's  "allergies indicates:   Allergen Reactions    Penicillins Hives    Percocet [oxycodone-acetaminophen] Other (See Comments)     Nausea, Dizziness, Anxiety.  "I don't like how it makes me feel."   Given Hydromorphone 0.5mg IVP  Without problems.    Diovan hct [valsartan-hydrochlorothiazide] Other (See Comments)     Causes coughing    Tramadol Nausea And Vomiting     Sedation History:  no adverse reactions    Review of Systems:   Hematological: no known coagulopathies  Respiratory: no shortness of breath  Cardiovascular: no chest pain  Gastrointestinal: no abdominal pain  Genito-Urinary: no dysuria  Musculoskeletal: negative  Neurological: no TIA or stroke symptoms         OBJECTIVE:     Vital Signs (Most Recent)  Temp: 98 °F (36.7 °C) (10/24/18 1216)  Pulse: 66 (10/24/18 1216)  Resp: 18 (10/24/18 1216)  BP: 130/78 (10/24/18 1216)  SpO2: (!) 94 % (10/24/18 1216)    Physical Exam:  ASA: 3  Mallampati: 2    General: no acute distress  Mental Status: alert and oriented to person, place and time  HEENT: normocephalic, atraumatic  Chest: unlabored breathing  Heart: regular heart rate  Abdomen: nondistended  Extremity: moves all extremities    Laboratory  Lab Results   Component Value Date    INR 1.0 10/21/2018       Lab Results   Component Value Date    WBC 4.46 10/21/2018    HGB 9.1 (L) 10/21/2018    HCT 31.2 (L) 10/21/2018    MCV 57 (L) 10/21/2018     10/21/2018      Lab Results   Component Value Date    GLU 97 10/22/2018     10/22/2018    K 3.9 10/22/2018     10/22/2018    CO2 28 10/22/2018    BUN 26 (H) 10/22/2018    CREATININE 1.2 10/22/2018    CALCIUM 9.7 10/22/2018    MG 1.8 10/21/2018    ALT 11 10/22/2018    AST 18 10/22/2018    ALBUMIN 3.7 10/22/2018    BILITOT 3.3 (H) 10/22/2018    BILIDIR 0.1 09/27/2018       ASSESSMENT/PLAN:     Sedation Plan: Moderate.  Patient will undergo Transjugular Liver Biopsy.    Cornel Hernandez MD  PGY-II Radiology Resident  [address] [facility]  Pager: [phone]

## 2018-10-24 NOTE — DISCHARGE SUMMARY
Radiology Discharge Summary      Hospital Course: No complications    Admit Date: 10/24/2018  Discharge Date: 10/24/2018     Instructions Given to Patient: Yes  Diet: Resume prior diet  Activity: activity as tolerated    Description of Condition on Discharge: Stable  Vital Signs (Most Recent): Temp: 98 °F (36.7 °C) (10/24/18 1216)  Pulse: 71 (10/24/18 1800)  Resp: 16 (10/24/18 1800)  BP: (!) 169/90 (10/24/18 1800)  SpO2: 99 % (10/24/18 1800)    Discharge Disposition: Home    Discharge Diagnosis: elevated total bilirubin     Follow-up: per hepatology    Parviz Daly MD  Radiology PGY-3  [phone]

## 2018-10-24 NOTE — PROGRESS NOTES
Notified pacemaker clinic, stewart patient has AICD, stewart states if cautery is used during procedure than must place magnet otherwise no other interrogations is needed.

## 2018-10-25 ENCOUNTER — TELEPHONE (OUTPATIENT)
Dept: SLEEP MEDICINE | Facility: OTHER | Age: 53
End: 2018-10-25

## 2018-10-25 NOTE — DISCHARGE INSTRUCTIONS
Please call with any questions or concerns.      Monday thru Friday 8:00 am - 4:30 pm    Interventional Radiology   [phone]    After Hours    Ask for the Radiology Intern on call  [phone]

## 2018-10-29 ENCOUNTER — CLINICAL SUPPORT (OUTPATIENT)
Dept: ELECTROPHYSIOLOGY | Facility: CLINIC | Age: 53
End: 2018-10-29
Attending: INTERNAL MEDICINE
Payer: MEDICAID

## 2018-10-29 DIAGNOSIS — Z95.810 AICD (AUTOMATIC CARDIOVERTER/DEFIBRILLATOR) PRESENT: ICD-10-CM

## 2018-10-29 DIAGNOSIS — I50.9 CHF (CONGESTIVE HEART FAILURE): ICD-10-CM

## 2018-10-29 DIAGNOSIS — I48.0 PAF (PAROXYSMAL ATRIAL FIBRILLATION): ICD-10-CM

## 2018-10-29 DIAGNOSIS — I42.8 NONISCHEMIC CARDIOMYOPATHY: ICD-10-CM

## 2018-10-29 PROCEDURE — 93295 DEV INTERROG REMOTE 1/2/MLT: CPT | Mod: NTX,,, | Performed by: INTERNAL MEDICINE

## 2018-10-29 PROCEDURE — 93296 REM INTERROG EVL PM/IDS: CPT | Mod: PBBFAC,NTX | Performed by: INTERNAL MEDICINE

## 2018-11-05 ENCOUNTER — TELEPHONE (OUTPATIENT)
Dept: SLEEP MEDICINE | Facility: OTHER | Age: 53
End: 2018-11-05

## 2018-11-07 ENCOUNTER — OFFICE VISIT (OUTPATIENT)
Dept: HEPATOLOGY | Facility: CLINIC | Age: 53
End: 2018-11-07
Payer: MEDICAID

## 2018-11-07 VITALS
HEIGHT: 66 IN | WEIGHT: 197.06 LBS | BODY MASS INDEX: 31.67 KG/M2 | SYSTOLIC BLOOD PRESSURE: 129 MMHG | OXYGEN SATURATION: 95 % | HEART RATE: 99 BPM | DIASTOLIC BLOOD PRESSURE: 75 MMHG

## 2018-11-07 DIAGNOSIS — R17 TOTAL BILIRUBIN, ELEVATED: Primary | ICD-10-CM

## 2018-11-07 PROCEDURE — 99214 OFFICE O/P EST MOD 30 MIN: CPT | Mod: S$PBB,NTX,, | Performed by: NURSE PRACTITIONER

## 2018-11-07 PROCEDURE — 99214 OFFICE O/P EST MOD 30 MIN: CPT | Mod: PBBFAC,NTX | Performed by: NURSE PRACTITIONER

## 2018-11-07 PROCEDURE — 99999 PR PBB SHADOW E&M-EST. PATIENT-LVL IV: CPT | Mod: PBBFAC,TXP,, | Performed by: NURSE PRACTITIONER

## 2018-11-07 NOTE — PROGRESS NOTES
[facility] Established Patient Visit    Reason for Visit:  F/u liver biopsy results    PCP: Aster Paz    HPI:  This is a 53 y.o. female here for f/u of liver biopsy results for evaluation of elevated bilirubin levels. Her bilirubin has been elevated consistently since 2/2017 and prior to this was intermittently elevated since 2/2016 when she established care here. Tbili has been as high as 5's. Indirect > direct. Transaminases and alk phos have been normal. INR 0.9-1.3, most recently was 1.1. She was hospitalized with CHF in late 8/2018. Tbili 5.6 then, improved to 2.5 today. Her reticulocytes have been elevated when checked twice in the past, last 6/2018. She has not had any issues with ascites. CT abd w/wo contrast in 6/2018 showed normal liver, spleen. U/s 12/2017 showed hepatomegaly at 20.8 cm, homogenous echotexture. Bile ducts normal. Spleen nl. She has normal plts in 200's. Chronic microcytic anemia. No findings to suggest overt cirrhosis.      Her recent 2D echo shows EF 20-25%. Pulmonary HTN. She is following closely with heart transplant clinic. Abd u/s done 9/27/18 showed hepatomegaly at 18.7 cm with homogenous echotexture. No masses. Spleen nl 11.1 cm, no ascites. U/s elastography = F2-F3. HRI 1.1, < 5% steatosis. Hep B and C negative. Retic count was back to normal on repeat.      Liver biopsy was recommended for full evaluation since she is being considered for heart transplant/LVAD. TJ liver biopsy done 10/24/18 shows minimal findings but no fibrosis. No pressure measurements were obtained though.    FINAL PATHOLOGIC DIAGNOSIS  Liver, random, biopsy:  - Minimal steatosis, predominantly macrovesicular, 1%.  - Minimal nonspecific portal inflammation.  - No significant fibrosis seen.  - PAS-D and copper pending, results will be issued in an addendum.  - See comment.  COMMENT: The biopsy is adequate for evaluation. The patient's history of elevated total bilirubin is noted and select  information from the electronic medical record is reviewed. The biopsy is relatively unremarkable with minimal steatosis and nonspecific portal inflammation. There is no definitive steatohepatitis. There is no  cholestasis or ductular reaction seen. The bile ducts are unremarkable. There is no significant plasma cell population seen. There are no granulomas seen. There is no increased iron on iron stain. There is no increased fibrosis on trichrome stain. There is no clear etiology for elevated bilirubin seen on this biopsy. Appropriate positive  controls are examined.    She reports no longer having rectal bleeding. Was seen by GI. Colonoscopy was scheduled for 10/30/18 but then she had emergency with her daughter who's pregnant so she could not make appt because she needed to take care of her grandchildren. Needs to reschedule.       ROS:   GENERAL: Denies fever, chills, weight loss/gain, (+) fatigue  HEENT: Denies headaches, dizziness, vision/hearing changes  CARDIOVASCULAR: Denies chest pain, palpitations, or edema  RESPIRATORY: Denies dyspnea, cough  GI: Denies abdominal pain, rectal bleeding, nausea, vomiting. No change in bowel pattern or color  : Denies dysuria, hematuria   SKIN: Denies rash, itching   NEURO: Denies confusion, memory loss, or mood changes  PSYCH: (+) depression  HEME/LYMPH: Denies easy bruising or bleeding      PMHX:  has a past medical history of Anemia, Anticoagulant long-term use, Arthritis, Atrial fibrillation, Congestive heart failure, COPD (chronic obstructive pulmonary disease), Deep vein thrombosis, Encounter for blood transfusion, GERD (gastroesophageal reflux disease), Hypertension, Pheochromocytoma, malignant, Right kidney mass, Sleep apnea, and Thyroid disease.    PSHX:  has a past surgical history that includes Appendectomy; Hysterectomy; Cardiac defibrillator placement (Left, 12/2016); Fracture surgery (Left); Eye surgery; Knee surgery (Left, 2016); BIOPSY, LIVER, TRANSJUGULAR  "APPROACH (N/A, 10/24/2018); Right heart cath (Right, 2/6/2018); PROCEDURE PROLAPSE HEMORRHOID (PPH) (N/A, 3/7/2017); INSERTION-ICD-SINGLE (N/A, 12/2/2016); and HEART CATH-RIGHT (Right, 7/22/2016).    The patient's social and family histories were reviewed by me and updated in the appropriate section of the electronic medical record.    Review of patient's allergies indicates:   Allergen Reactions    Penicillins Hives    Percocet [oxycodone-acetaminophen] Other (See Comments)     Nausea, Dizziness, Anxiety.  "I don't like how it makes me feel."   Given Hydromorphone 0.5mg IVP  Without problems.    Diovan hct [valsartan-hydrochlorothiazide] Other (See Comments)     Causes coughing    Tramadol Nausea And Vomiting       Current Outpatient Medications on File Prior to Visit   Medication Sig Dispense Refill    albuterol-ipratropium 2.5mg-0.5mg/3mL (DUO-NEB) 0.5 mg-3 mg(2.5 mg base)/3 mL nebulizer solution Take 1 mL by nebulization every 6 (six) hours as needed for Wheezing or Shortness of Breath. 1 Box 2    ALPRAZolam (XANAX) 1 MG tablet Take 1 mg by mouth nightly as needed for Anxiety.      atorvastatin (LIPITOR) 40 MG tablet Take 1 tablet (40 mg total) by mouth once daily. 90 tablet 3    b complex vitamins tablet Take 1 tablet by mouth once daily.      carvedilol (COREG) 25 MG tablet Take 1 tablet by mouth 2 (two) times daily.      cyanocobalamin, vitamin B-12, 50 mcg tablet Take 5 mcg by mouth once daily.      ELIQUIS 5 mg Tab TAKE 1 TABLET BY MOUTH TWICE DAILY 60 tablet 0    fluticasone-vilanterol (BREO ELLIPTA) 100-25 mcg/dose diskus inhaler Inhale 1 puff into the lungs once daily. Controller      furosemide (LASIX) 20 MG tablet Take 3 tablets (60 mg total) by mouth 3 (three) times daily. 270 tablet 2    isosorbide dinitrate (ISORDIL) 20 MG tablet Take 2 tablets (40 mg total) by mouth 3 (three) times daily. Please check that this is what you are already taking at home. 180 tablet 11    losartan " "(COZAAR) 25 MG tablet Take 1 tablet (25 mg total) by mouth once daily. 90 tablet 3    metoprolol succinate (TOPROL-XL) 25 MG 24 hr tablet Take 25 mg by mouth once daily.      NITROSTAT 0.4 mg SL tablet Take 2.5 tablets (1 mg total) by mouth every 5 (five) minutes as needed for Chest pain. No more than 3 tablets in 15 minutes.      pantoprazole (PROTONIX) 40 MG tablet Take 1 tablet by mouth once daily.  5    polyethylene glycol (GOLYTELY,NULYTELY) 236-22.74-6.74 -5.86 gram suspension Use as directed 1 Bottle 0    potassium chloride (KLOR-CON) 10 MEQ TbSR Take 1 tablet (10 mEq total) by mouth once daily.      spironolactone (ALDACTONE) 25 MG tablet Take 1 tablet (25 mg total) by mouth once daily. 30 tablet 1    VENTOLIN HFA 90 mcg/actuation inhaler Inhale 2 puffs into the lungs 2 (two) times daily as needed.   11    walker Misc 1 Device by Misc.(Non-Drug; Combo Route) route as needed.  0     No current facility-administered medications on file prior to visit.          Objective Findings:    PHYSICAL EXAM:   Friendly Black or  female, in no acute distress; alert and oriented to person, place and time  VITALS: /75 (BP Location: Right arm, Patient Position: Sitting, BP Method: Medium (Automatic))   Pulse 99   Ht 5' 6" (1.676 m)   Wt 89.4 kg (197 lb 1.5 oz)   LMP  (LMP Unknown)   SpO2 95%   BMI 31.81 kg/m²   HENT: Normocephalic, without obvious abnormality. Oral mucosa pink and moist. Dentition good.  EYES: Sclerae anicteric. No conjunctival pallor.   NECK: Supple. No masses or cervical adenopathy.  CARDIOVASCULAR: Regular rate and rhythm. No murmurs.  RESPIRATORY: Normal respiratory effort. BBS CTA. No wheezes or crackles.  GI: Soft, non-tender, non-distended. No hepatosplenomegaly. No masses palpable. No ascites.  EXTREMITIES:  No clubbing, cyanosis or edema.  SKIN: Warm and dry. No jaundice. No rashes noted to exposed skin. No telangectasias noted. No palmar erythema.  NEURO:  Normal " gate. No asterixis.  PSYCH:  Memory intact. Thought and speech pattern appropriate. Behavior normal. (+) depression noted.      Labs:  Lab Results   Component Value Date    WBC 4.46 10/21/2018    HGB 9.1 (L) 10/21/2018    HCT 31.2 (L) 10/21/2018     10/21/2018    CHOL 136 10/21/2018    TRIG 75 10/21/2018    HDL 59 10/21/2018     10/22/2018    K 3.9 10/22/2018    CREATININE 1.2 10/22/2018    ALT 11 10/22/2018    AST 18 10/22/2018    ALKPHOS 65 10/22/2018    BILITOT 3.3 (H) 10/22/2018    ALBUMIN 3.7 10/22/2018    INR 1.0 10/21/2018       ASSESSMENT:  53 y.o. Black or  female with:  1.  Elevated bilirubin, unclear etiology. Could be Gilbert's +/- hemolysis  -- indirect > direct  -- range of normal to 5's  -- normal liver, bile ducts, and spleen on CT/u/s  -- normal transaminases and alk phos  -- INR nl  -- retic count previously elevated but then returned to normal on 9/27/18  -- liver biopsy 10/24/18 - Minimal steatosis, predominantly macrovesicular, 1%, Minimal nonspecific portal inflammation, no fibrosis. No findings on biopsy to explain elevated bilirubin levels  2. CHF, AICD, A-fib, pulmonary HTN  -- following with heart transplant  -- may be considered for LVAD or heart transplant in future   3. Rectal bleeding, upper abd pain, stool change, microcytic anemia  -- seen by GI and colonoscopy was being arranged but had to be cancelled d/t not getting clearance form for anticoagulant recommendations in time  -- pt reports no more rectal bleeding         PLAN:  1. Reassured pt that she has no chronic liver disease based on biopsy results. Could be d/t Gilbert's syndrome +/- hemolysis. Handout on Gilbert's given to pt  2. Will check labs for retic count again with next upcoming lab for monitoring. Also will send off genetic blood test for Gilbert's to determine if she has this  3. Pt cleared from hepatology perspective for heart transplant and/or LVAD  4. No f/u needed in our  clinic      Thank you for allowing me to participate in the care of Hafsa Linn ALONDRA KatC

## 2018-11-14 ENCOUNTER — TELEPHONE (OUTPATIENT)
Dept: SLEEP MEDICINE | Facility: OTHER | Age: 53
End: 2018-11-14

## 2018-11-14 NOTE — TELEPHONE ENCOUNTER
Patient did let me know she's not able to reschedule the sleep study for some time.  Her daughter is ill.

## 2018-11-17 ENCOUNTER — NURSE TRIAGE (OUTPATIENT)
Dept: ADMINISTRATIVE | Facility: CLINIC | Age: 53
End: 2018-11-17

## 2018-11-17 PROBLEM — R07.9 CHEST PAIN: Status: ACTIVE | Noted: 2018-11-17

## 2018-11-18 NOTE — TELEPHONE ENCOUNTER
"  Reason for Disposition   [1] Chest pain lasts > 5 minutes AND [2] age > 50    Answer Assessment - Initial Assessment Questions  1. LOCATION: "Where does it hurt?"        In the center of chest, tightness, heaviness  2. RADIATION: "Does the pain go anywhere else?" (e.g., into neck, jaw, arms, back)      Back   3. ONSET: "When did the chest pain begin?" (Minutes, hours or days)       About 20-30 min ago  4. PATTERN "Does the pain come and go, or has it been constant since it started?"  "Does it get worse with exertion?"       Constant  5. DURATION: "How long does it last" (e.g., seconds, minutes, hours)      20-30 min  6. SEVERITY: "How bad is the pain?"  (e.g., Scale 1-10; mild, moderate, or severe)     - MILD (1-3): doesn't interfere with normal activities      - MODERATE (4-7): interferes with normal activities or awakens from sleep     - SEVERE (8-10): excruciating pain, unable to do any normal activities        8-10/10  7. CARDIAC RISK FACTORS: "Do you have any history of heart problems or risk factors for heart disease?" (e.g., prior heart attack, angina; high blood pressure, diabetes, being overweight, high cholesterol, smoking, or strong family history of heart disease)      Defribrillator  8. PULMONARY RISK FACTORS: "Do you have any history of lung disease?"  (e.g., blood clots in lung, asthma, emphysema, birth control pills)      Yes, COPD  9. CAUSE: "What do you think is causing the chest pain?"      unsure  10. OTHER SYMPTOMS: "Do you have any other symptoms?" (e.g., dizziness, nausea, vomiting, sweating, fever, difficulty breathing, cough)        Weakness, warm, "not SOB but it feels like it's hard to breathe a little"  11. PREGNANCY: "Is there any chance you are pregnant?" "When was your last menstrual period?"        no    Protocols used: ST CHEST PAIN-A-AH    "

## 2018-12-10 ENCOUNTER — OFFICE VISIT (OUTPATIENT)
Dept: TRANSPLANT | Facility: CLINIC | Age: 53
End: 2018-12-10
Payer: MEDICAID

## 2018-12-10 ENCOUNTER — LAB VISIT (OUTPATIENT)
Dept: LAB | Facility: HOSPITAL | Age: 53
End: 2018-12-10
Attending: PHYSICIAN ASSISTANT
Payer: MEDICAID

## 2018-12-10 VITALS
BODY MASS INDEX: 32.42 KG/M2 | SYSTOLIC BLOOD PRESSURE: 140 MMHG | HEIGHT: 66 IN | WEIGHT: 201.75 LBS | DIASTOLIC BLOOD PRESSURE: 70 MMHG | HEART RATE: 74 BPM

## 2018-12-10 DIAGNOSIS — J43.8 OTHER EMPHYSEMA: Chronic | ICD-10-CM

## 2018-12-10 DIAGNOSIS — N18.30 CHRONIC KIDNEY DISEASE, STAGE III (MODERATE): Chronic | ICD-10-CM

## 2018-12-10 DIAGNOSIS — I48.0 PAROXYSMAL ATRIAL FIBRILLATION: Chronic | ICD-10-CM

## 2018-12-10 DIAGNOSIS — I50.43 ACUTE ON CHRONIC COMBINED SYSTOLIC AND DIASTOLIC CONGESTIVE HEART FAILURE: ICD-10-CM

## 2018-12-10 DIAGNOSIS — I10 ESSENTIAL HYPERTENSION: Primary | Chronic | ICD-10-CM

## 2018-12-10 DIAGNOSIS — R17 TOTAL BILIRUBIN, ELEVATED: ICD-10-CM

## 2018-12-10 DIAGNOSIS — I42.8 NICM (NONISCHEMIC CARDIOMYOPATHY): Chronic | ICD-10-CM

## 2018-12-10 DIAGNOSIS — Z95.810 ICD (IMPLANTABLE CARDIOVERTER-DEFIBRILLATOR) IN PLACE: Chronic | ICD-10-CM

## 2018-12-10 DIAGNOSIS — I51.7 LEFT VENTRICULAR HYPERTROPHY: Chronic | ICD-10-CM

## 2018-12-10 DIAGNOSIS — I50.43 ACUTE ON CHRONIC COMBINED SYSTOLIC AND DIASTOLIC HEART FAILURE: ICD-10-CM

## 2018-12-10 LAB
ALBUMIN SERPL BCP-MCNC: 3.7 G/DL
ALP SERPL-CCNC: 70 U/L
ALT SERPL W/O P-5'-P-CCNC: 12 U/L
ANION GAP SERPL CALC-SCNC: 7 MMOL/L
AST SERPL-CCNC: 19 U/L
BILIRUB SERPL-MCNC: 2.6 MG/DL
BUN SERPL-MCNC: 31 MG/DL
CALCIUM SERPL-MCNC: 9.9 MG/DL
CHLORIDE SERPL-SCNC: 106 MMOL/L
CO2 SERPL-SCNC: 27 MMOL/L
CREAT SERPL-MCNC: 1.3 MG/DL
EST. GFR  (AFRICAN AMERICAN): 54.1 ML/MIN/1.73 M^2
EST. GFR  (NON AFRICAN AMERICAN): 47 ML/MIN/1.73 M^2
GLUCOSE SERPL-MCNC: 91 MG/DL
POTASSIUM SERPL-SCNC: 4.8 MMOL/L
PROT SERPL-MCNC: 7.8 G/DL
RETICS/RBC NFR AUTO: 1.9 %
SODIUM SERPL-SCNC: 140 MMOL/L

## 2018-12-10 PROCEDURE — 85045 AUTOMATED RETICULOCYTE COUNT: CPT | Mod: TXP

## 2018-12-10 PROCEDURE — 99213 OFFICE O/P EST LOW 20 MIN: CPT | Mod: PBBFAC,TXP | Performed by: INTERNAL MEDICINE

## 2018-12-10 PROCEDURE — 99215 OFFICE O/P EST HI 40 MIN: CPT | Mod: S$PBB,TXP,, | Performed by: INTERNAL MEDICINE

## 2018-12-10 PROCEDURE — 99999 PR PBB SHADOW E&M-EST. PATIENT-LVL III: CPT | Mod: PBBFAC,TXP,, | Performed by: INTERNAL MEDICINE

## 2018-12-10 PROCEDURE — 80053 COMPREHEN METABOLIC PANEL: CPT | Mod: TXP

## 2018-12-10 PROCEDURE — 81479 UNLISTED MOLECULAR PATHOLOGY: CPT | Mod: NTX

## 2018-12-10 PROCEDURE — 36415 COLL VENOUS BLD VENIPUNCTURE: CPT | Mod: TXP

## 2018-12-10 RX ORDER — ROPINIROLE 4 MG/1
4 TABLET, FILM COATED ORAL DAILY
COMMUNITY
End: 2021-12-28 | Stop reason: SDUPTHER

## 2018-12-10 NOTE — LETTER
December 10, 2018        Andrea Pacheco  [address]  [address]  [place] [address]  Phone: [phone]  Fax: [phone]             [facility] [address] [place] [address]  Phone: [phone]   Patient: Hafsa Hawley   MR Number: [id number]   YOB: 1965   Date of Visit: 12/10/2018       Dear Dr. Andrea Pacheco    Thank you for referring Hafsa Hawley to me for evaluation. Attached you will find relevant portions of my assessment and plan of care.    If you have questions, please do not hesitate to call me. I look forward to following Hafsa Hawley along with you.    Sincerely,    Armando Garcia MD    Enclosure    If you would like to receive this communication electronically, please contact [email or web address] or [phone] to request [employer] access.    [employer] is a tool which provides read-only access to select patient information with whom you have a relationship. Its easy to use and provides real time access to review your patients record including encounter summaries, notes, results, and demographic information.    If you feel you have received this communication in error or would no longer like to receive these types of communications, please e-mail [email or web address]

## 2018-12-10 NOTE — PROGRESS NOTES
Subjective:     HPI:  Ms. Hawley is a 53 y.o. year old Black or  female who has presents for one month follow up.Seen by Dr Lorenzo to be evaluated as a potential heart transplant recipient (referred by Juinor / Dana)   At her initial visit in July 2018, she was told to complete her pheochromocytoma. She was seen by [facility] who feel she is unlikely to have pheochromocytoma  They recommend that we can pursue heart transplant workup.    She is here for follow up.She has good and bad days. SOB FC II III NYHA.some chest pain. Biopsy liver negative       PREVIOUS HISTORY    Patient most recently admitted at [facility] with ADHF, BNP significantly elevated at 2573, had to be diuresed, additionally had episode of VT x 1, and chest pain overnight. Had elevated troponin as well at around .6, felt to be due to ADHF and tachycardia. Bili was elevated as well, bili all the way up to 5.6. Last visit was sent to hepatology and they plan to do US  elastography but felt ok to proceed with LVAD/OHT workup. Last visit she was to increase isordil to 2 tablets TID but did not do this. Today, feels less SOB. Has some mild musculoskeletal pains she feels is related to her AICD.      TTE 08/31/18:    1 - Severely depressed left ventricular systolic function (EF 20-25%).     2 - Impaired LV relaxation, elevated LAP (grade 2 diastolic dysfunction).     3 - Pulmonary hypertension. The estimated PA systolic pressure is 43 mmHg.     4 - Mild mitral regurgitation.     5 - Increased central venous pressure.     6 - Moderate pulmonic regurgitation.     7- LVH     Six minute walk 06/21/2018---------Distance: 292.61 meters (960 feet)     O2 Sat % Supplemental Oxygen Heart Rate Blood Pressure Haseeb   Scale   Pre-exercise  (Resting) 98 % Room Air 70 bpm 173/97 mmHg 0.5   During Exercise 95 % Room Air 80 bpm 143/100 mmHg 5-6   Post-exercise  (Recovery) 98 % Room Air  68 bpm 141/93 mmHg       FINAL PATHOLOGIC  DIAGNOSIS  Liver, random, biopsy:  - Minimal steatosis, predominantly macrovesicular, 1%.  - Minimal nonspecific portal inflammation.  - No significant fibrosis seen.  - PAS-D and copper pending, results will be issued in an addendum.  - See comment.  COMMENT: The biopsy is adequate for evaluation. The patient's history of elevated total bilirubin is noted and  select information from the electronic medical record is reviewed. The biopsy is relatively unremarkable with  minimal steatosis and nonspecific portal inflammation. There is no definitive steatohepatitis. There is no  cholestasis or ductular reaction seen. The bile ducts are unremarkable. There is no significant plasma cell  population seen. There are no granulomas seen. There is no increased iron on iron stain. There is no increased  fibrosis on trichrome stain. There is no clear etiology for elevated bilirubin seen on this biopsy. Appropriate positive  controls are examined.  Diagnosed      Past Medical History:   Diagnosis Date    Anemia     Anticoagulant long-term use     Arthritis     Atrial fibrillation     Congestive heart failure     COPD (chronic obstructive pulmonary disease)     Deep vein thrombosis     Encounter for blood transfusion     GERD (gastroesophageal reflux disease)     Hypertension     Pheochromocytoma, malignant     Right kidney mass     Sleep apnea     Thyroid disease     Total bilirubin, elevated 8/30/2018    -- liver biopsy 10/24/18 - Minimal steatosis, predominantly macrovesicular, 1%, Minimal nonspecific portal inflammation, no fibrosis. No findings on biopsy to explain elevated bilirubin levels. Could be d/t Gilbert's =?- hemolysis     Past Surgical History:   Procedure Laterality Date    APPENDECTOMY      BIOPSY, LIVER, TRANSJUGULAR APPROACH N/A 10/24/2018    Performed by [facility] Diagnostic Provider at [facility] OR [facility] FLR    CARDIAC DEFIBRILLATOR PLACEMENT Left 12/2016    EYE SURGERY      due to running tears     "FRACTURE SURGERY Left     hand 5th digit    HEART CATH-RIGHT Right 7/22/2016    Performed by Rakesh Garcia MD at [facility] CATH LAB    HYSTERECTOMY      INSERTION-ICD-SINGLE N/A 12/2/2016    Performed by Bob Pretty MD at [facility] CATH LAB    KNEE SURGERY Left 2016    hematoma    LIVER BIOPSY  10/24/2018    Minimal steatosis, predominantly macrovesicular, 1%, Minimal nonspecific portal inflammation, no fibrosis. No findings on biopsy to explain elevated bilirubin levels. Could be d/t Gilbert's =?- hemolysis    PROCEDURE PROLAPSE HEMORRHOID (PPH) N/A 3/7/2017    Performed by GEORGE Rothman MD at [facility] OR    Right heart cath Right 2/6/2018    Performed by Benson Cortez MD at [facility] CATH LAB    TRANSJUGULAR BIOPSY OF LIVER N/A 10/24/2018    Procedure: BIOPSY, LIVER, TRANSJUGULAR APPROACH;  Surgeon: Carmen Diagnostic Provider;  Location: [facility] OR [address] [place] [address];  Service: Radiology;  Laterality: N/A;     OB History     No data available        Review of Systems   Constitution: Negative for chills, decreased appetite, diaphoresis, fever, weakness and weight gain.   Cardiovascular: Negative for chest pain, claudication, cyanosis, dyspnea on exertion and paroxysmal nocturnal dyspnea.   Respiratory: Negative for cough, hemoptysis, shortness of breath, sleep disturbances due to breathing and snoring.    Skin: Negative for nail changes.   Gastrointestinal: Negative for bloating, nausea and vomiting.   Psychiatric/Behavioral: Negative for depression.       Objective:   Blood pressure (!) 169/80, pulse 74, height 5' 6" (1.676 m), weight 91.5 kg (201 lb 11.5 oz).body mass index is 32.56 kg/m².  Physical Exam   Constitutional: She appears well-developed.   HENT:   Head: Normocephalic.   Eyes: Pupils are equal, round, and reactive to light.   Neck: Normal range of motion. No JVD present.   Cardiovascular: Normal rate. Exam reveals no friction rub.   No murmur heard.  Pulmonary/Chest: Effort normal. No respiratory distress. "   Abdominal: Soft. She exhibits no distension. There is no tenderness.   Musculoskeletal: Normal range of motion. She exhibits no edema.   Neurological: She is alert. No cranial nerve deficit.   Skin: Skin is warm. She is not diaphoretic. No erythema.       Labs:    Chemistry        Component Value Date/Time     11/19/2018 0507    K 4.1 11/19/2018 0507     11/19/2018 0507    CO2 27 11/19/2018 0507    BUN 37 (H) 11/19/2018 0507    CREATININE 1.05 11/19/2018 0507     (H) 11/19/2018 0507        Component Value Date/Time    CALCIUM 9.4 11/19/2018 0507    ALKPHOS 54 11/19/2018 0507    AST 25 11/19/2018 0507    ALT 16 11/19/2018 0507    BILITOT 1.7 (H) 11/19/2018 0507    ESTGFRAFRICA >60.0 11/19/2018 0507    EGFRNONAA >60.0 11/19/2018 0507          Magnesium   Date Value Ref Range Status   10/21/2018 1.8 1.6 - 2.6 mg/dL Final     Lab Results   Component Value Date    WBC 5.20 11/19/2018    HGB 8.9 (L) 11/19/2018    HCT 27.9 (L) 11/19/2018     11/19/2018     Lab Results   Component Value Date    INR 1.0 10/21/2018    INR 1.2 09/27/2018    INR 1.1 08/20/2018     BNP   Date Value Ref Range Status   10/21/2018 924 (H) 0 - 99 pg/mL Final     Comment:     Values of less than 100 pg/ml are consistent with non-CHF populations.   09/07/2018 471 (H) 0 - 99 pg/mL Final     Comment:     Values of less than 100 pg/ml are consistent with non-CHF populations.   08/30/2018 2,573 (H) 0 - 99 pg/mL Final     Comment:     Values of less than 100 pg/ml are consistent with non-CHF populations.     LD   Date Value Ref Range Status   06/11/2018 308 (H) 110 - 260 U/L Final     Comment:     Results are increased in hemolyzed samples.   08/23/2016 280 (H) 110 - 260 U/L Final     Comment:     Results are increased in hemolyzed samples.     No results found for this or any previous visit.  No results found for this or any previous visit.    Assessment:      1. Essential hypertension    2. Acute on chronic combined systolic  and diastolic heart failure    3. ICD (implantable cardioverter-defibrillator) in place    4. Left ventricular hypertrophy    5. NICM (nonischemic cardiomyopathy)    6. Paroxysmal atrial fibrillation    7. Chronic kidney disease, stage III (moderate)    8. Other emphysema        Plan:   HFrEF FC III NYHA we will try entresto 24/26 and increase in 1 month    RTC in 1months.       Patient is now NYHA III  Recommend 2 gram sodium restriction and 1500cc fluid restriction.  Encourage physical activity with graded exercise program.  Requested patient to weigh themselves daily, and to notify us if their weight increases by more than 3 lbs in 1 day or 5 lbs in 1 week.     Listed for transplant: No

## 2018-12-13 ENCOUNTER — TELEPHONE (OUTPATIENT)
Dept: CARDIOLOGY | Facility: HOSPITAL | Age: 53
End: 2018-12-13

## 2018-12-13 NOTE — TELEPHONE ENCOUNTER
Called pt JAYASHREE and lew PATEL w/ device clinic # option 4.      ----- Message from Ina Woody sent at 12/13/2018  1:18 PM CST -----  Contact: pt [phone]  Pt would like a call she says she has questions about her device.    Thanks

## 2018-12-18 LAB
MOL DX INTERP BLD/T QL: NORMAL
REF LAB TEST METHOD: NORMAL
TEST PERFORMANCE INFO SPEC: NORMAL
UGT1A1 ADDITIONAL INFORMATION: NORMAL
UGT1A1 FULL GENE SEQUENCE RESULT: NORMAL
UGT1A1 INTERPRETATION: NORMAL
UGT1A1 REVIEWED BY: NORMAL
UGT1A1 TA REPEAT RESULT: NORMAL

## 2018-12-20 ENCOUNTER — TELEPHONE (OUTPATIENT)
Dept: HEPATOLOGY | Facility: CLINIC | Age: 53
End: 2018-12-20

## 2018-12-20 DIAGNOSIS — E80.4 GILBERT'S SYNDROME: ICD-10-CM

## 2018-12-20 PROBLEM — R17 TOTAL BILIRUBIN, ELEVATED: Status: RESOLVED | Noted: 2018-08-30 | Resolved: 2018-12-20

## 2018-12-20 NOTE — TELEPHONE ENCOUNTER
Lab results show pt does have Gilbert's syndrome as cause for her elevated bilirubin levels. She is a homozygote for TA7 genes.     It should be noted that in report from this test:  In addition, homozygosity for the TA7 promoter variant   places this individual at significantly increased risk for   severe neutropenia (grade 4) when treated with the standard dose of irinotecan (risk approximately 50%).   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 Metabolism of other drugs not listed here may also be impacted by UGT1A1 enzyme activity. Drug labeling should be consulted for potential risks and drug dosing recommendations.     This was added to the pt's allergies as a precaution. Pt notified she has Gilbert's syndrome as cause for elevated bilirubin levels.

## 2019-01-10 ENCOUNTER — LAB VISIT (OUTPATIENT)
Dept: LAB | Facility: HOSPITAL | Age: 54
End: 2019-01-10
Attending: INTERNAL MEDICINE
Payer: MEDICAID

## 2019-01-10 ENCOUNTER — OFFICE VISIT (OUTPATIENT)
Dept: TRANSPLANT | Facility: CLINIC | Age: 54
End: 2019-01-10
Payer: MEDICAID

## 2019-01-10 VITALS
SYSTOLIC BLOOD PRESSURE: 145 MMHG | WEIGHT: 206.81 LBS | HEIGHT: 66 IN | DIASTOLIC BLOOD PRESSURE: 78 MMHG | BODY MASS INDEX: 33.24 KG/M2 | HEART RATE: 63 BPM

## 2019-01-10 DIAGNOSIS — G47.33 OSA (OBSTRUCTIVE SLEEP APNEA): Chronic | ICD-10-CM

## 2019-01-10 DIAGNOSIS — I47.10 PAROXYSMAL SUPRAVENTRICULAR TACHYCARDIA: Chronic | ICD-10-CM

## 2019-01-10 DIAGNOSIS — I42.8 NICM (NONISCHEMIC CARDIOMYOPATHY): Chronic | ICD-10-CM

## 2019-01-10 DIAGNOSIS — I51.7 LEFT VENTRICULAR HYPERTROPHY: Chronic | ICD-10-CM

## 2019-01-10 DIAGNOSIS — I48.0 PAROXYSMAL ATRIAL FIBRILLATION: Chronic | ICD-10-CM

## 2019-01-10 DIAGNOSIS — I50.43 ACUTE ON CHRONIC COMBINED SYSTOLIC AND DIASTOLIC HEART FAILURE: Primary | ICD-10-CM

## 2019-01-10 DIAGNOSIS — I50.43 ACUTE ON CHRONIC COMBINED SYSTOLIC AND DIASTOLIC HEART FAILURE: ICD-10-CM

## 2019-01-10 DIAGNOSIS — I42.8 NICM (NONISCHEMIC CARDIOMYOPATHY): Primary | Chronic | ICD-10-CM

## 2019-01-10 DIAGNOSIS — N18.30 CHRONIC KIDNEY DISEASE, STAGE III (MODERATE): Chronic | ICD-10-CM

## 2019-01-10 LAB
ALBUMIN SERPL BCP-MCNC: 3.3 G/DL
ALP SERPL-CCNC: 70 U/L
ALT SERPL W/O P-5'-P-CCNC: 14 U/L
ANION GAP SERPL CALC-SCNC: 8 MMOL/L
AST SERPL-CCNC: 20 U/L
BILIRUB SERPL-MCNC: 2.8 MG/DL
BUN SERPL-MCNC: 19 MG/DL
CALCIUM SERPL-MCNC: 9.4 MG/DL
CHLORIDE SERPL-SCNC: 110 MMOL/L
CO2 SERPL-SCNC: 26 MMOL/L
CREAT SERPL-MCNC: 0.9 MG/DL
EST. GFR  (AFRICAN AMERICAN): >60 ML/MIN/1.73 M^2
EST. GFR  (NON AFRICAN AMERICAN): >60 ML/MIN/1.73 M^2
GLUCOSE SERPL-MCNC: 151 MG/DL
POTASSIUM SERPL-SCNC: 4.3 MMOL/L
PROT SERPL-MCNC: 7.1 G/DL
SODIUM SERPL-SCNC: 144 MMOL/L

## 2019-01-10 PROCEDURE — 99999 PR PBB SHADOW E&M-EST. PATIENT-LVL III: CPT | Mod: PBBFAC,TXP,, | Performed by: INTERNAL MEDICINE

## 2019-01-10 PROCEDURE — 36415 COLL VENOUS BLD VENIPUNCTURE: CPT | Mod: TXP

## 2019-01-10 PROCEDURE — 99999 PR PBB SHADOW E&M-EST. PATIENT-LVL III: ICD-10-PCS | Mod: PBBFAC,TXP,, | Performed by: INTERNAL MEDICINE

## 2019-01-10 PROCEDURE — 99215 PR OFFICE/OUTPT VISIT, EST, LEVL V, 40-54 MIN: ICD-10-PCS | Mod: S$PBB,NTX,, | Performed by: INTERNAL MEDICINE

## 2019-01-10 PROCEDURE — 99213 OFFICE O/P EST LOW 20 MIN: CPT | Mod: PBBFAC,NTX | Performed by: INTERNAL MEDICINE

## 2019-01-10 PROCEDURE — 99215 OFFICE O/P EST HI 40 MIN: CPT | Mod: S$PBB,NTX,, | Performed by: INTERNAL MEDICINE

## 2019-01-10 PROCEDURE — 80053 COMPREHEN METABOLIC PANEL: CPT | Mod: NTX

## 2019-01-10 NOTE — LETTER
January 10, 2019        Andrea Pacheco  [address]  [address]  [facility] [address]  Phone: [phone]  Fax: [phone]             [facility] [address] [place] [address]  Phone: [phone]   Patient: Hafsa Hawley   MR Number: [id number]   YOB: 1965   Date of Visit: 1/10/2019       Dear Dr. Andrea Pacheco    Thank you for referring Hafsa Hawley to me for evaluation. Attached you will find relevant portions of my assessment and plan of care.    If you have questions, please do not hesitate to call me. I look forward to following Hafsa Hawley along with you.    Sincerely,    Armando Garcia MD    Enclosure    If you would like to receive this communication electronically, please contact [email or web address] or [phone] to request [employer] access.    [employer] is a tool which provides read-only access to select patient information with whom you have a relationship. Its easy to use and provides real time access to review your patients record including encounter summaries, notes, results, and demographic information.    If you feel you have received this communication in error or would no longer like to receive these types of communications, please e-mail [email or web address]

## 2019-01-10 NOTE — PROGRESS NOTES
Subjective:     HPI:  Ms. Hawley is a 53 y.o. year old Black or  female who has presents for one month follow up.Seen by Dr Lorenzo to be evaluated as a potential heart transplant recipient (referred by Junior / Dana)   At her initial visit in July 2018, she was told to complete her pheochromocytoma. She was seen by [facility] who feel she is unlikely to have pheochromocytoma  They recommend that we can pursue heart transplant workup.    She is here for follow up after starting entresto. LAGUERRE FC II-III NYHA    PREVIOUS HISTORY    Patient most recently admitted at [facility] with ADHF, BNP significantly elevated at 2573, had to be diuresed, additionally had episode of VT x 1, and chest pain overnight. Had elevated troponin as well at around .6, felt to be due to ADHF and tachycardia. Bili was elevated as well, bili all the way up to 5.6. Last visit was sent to hepatology and they plan to do US  elastography but felt ok to proceed with LVAD/OHT workup. Last visit she was to increase isordil to 2 tablets TID but did not do this. Today, feels less SOB. Has some mild musculoskeletal pains she feels is related to her AICD.      TTE 08/31/18:    1 - Severely depressed left ventricular systolic function (EF 20-25%).     2 - Impaired LV relaxation, elevated LAP (grade 2 diastolic dysfunction).     3 - Pulmonary hypertension. The estimated PA systolic pressure is 43 mmHg.     4 - Mild mitral regurgitation.     5 - Increased central venous pressure.     6 - Moderate pulmonic regurgitation.     7- LVH     Six minute walk 06/21/2018---------Distance: 292.61 meters (960 feet)     O2 Sat % Supplemental Oxygen Heart Rate Blood Pressure Haseeb   Scale   Pre-exercise  (Resting) 98 % Room Air 70 bpm 173/97 mmHg 0.5   During Exercise 95 % Room Air 80 bpm 143/100 mmHg 5-6   Post-exercise  (Recovery) 98 % Room Air  68 bpm 141/93 mmHg       FINAL PATHOLOGIC DIAGNOSIS  Liver, random, biopsy:  - Minimal  steatosis, predominantly macrovesicular, 1%.  - Minimal nonspecific portal inflammation.  - No significant fibrosis seen.  - PAS-D and copper pending, results will be issued in an addendum.  - See comment.  COMMENT: The biopsy is adequate for evaluation. The patient's history of elevated total bilirubin is noted and  select information from the electronic medical record is reviewed. The biopsy is relatively unremarkable with  minimal steatosis and nonspecific portal inflammation. There is no definitive steatohepatitis. There is no  cholestasis or ductular reaction seen. The bile ducts are unremarkable. There is no significant plasma cell  population seen. There are no granulomas seen. There is no increased iron on iron stain. There is no increased  fibrosis on trichrome stain. There is no clear etiology for elevated bilirubin seen on this biopsy. Appropriate positive  controls are examined.  Diagnosed      Past Medical History:   Diagnosis Date    Anemia     Anticoagulant long-term use     Arthritis     Atrial fibrillation     Congestive heart failure     COPD (chronic obstructive pulmonary disease)     Deep vein thrombosis     elevated bilirubin d/t Gilbert's syndrome     confirmed by [place] genetic testing, evaluated by hepatology    Encounter for blood transfusion     GERD (gastroesophageal reflux disease)     Hypertension     Pheochromocytoma, malignant     Right kidney mass     Sleep apnea     Thyroid disease      Past Surgical History:   Procedure Laterality Date    APPENDECTOMY      BIOPSY, LIVER, TRANSJUGULAR APPROACH N/A 10/24/2018    Performed by [facility] OR [facility] FLR    CARDIAC DEFIBRILLATOR PLACEMENT Left 12/2016    EYE SURGERY      due to running tears    FRACTURE SURGERY Left     hand 5th digit    HEART CATH-RIGHT Right 7/22/2016    Performed by R[facility]h Garcia [facility] at [facility] CATH LAB    HYSTERECTOMY      INSERTION-ICD-SINGLE N/A 12/2/2016    Performed by Bob  "MD Sukhwinder at [facility] CATH LAB    KNEE SURGERY Left 2016    hematoma    LIVER BIOPSY  10/24/2018    Minimal steatosis, predominantly macrovesicular, 1%, Minimal nonspecific portal inflammation, no fibrosis. No findings on biopsy to explain elevated bilirubin levels. Could be d/t Gilbert's =?- hemolysis    PROCEDURE PROLAPSE HEMORRHOID (PPH) N/A 3/7/2017    Performed by [facility]man [facility] at [facility] OR    Right heart cath Right 2/6/2018    Performed by Benson Cortez MD at [facility] CATH LAB     OB History     No data available        Review of Systems   Constitution: Negative for chills, decreased appetite, diaphoresis, fever, weakness and weight gain.   Cardiovascular: Positive for dyspnea on exertion. Negative for chest pain, claudication, cyanosis and paroxysmal nocturnal dyspnea.   Respiratory: Negative for cough, hemoptysis, shortness of breath, sleep disturbances due to breathing and snoring.    Skin: Negative for nail changes.   Gastrointestinal: Negative for bloating, nausea and vomiting.   Psychiatric/Behavioral: Negative for depression.       Objective:   Blood pressure (!) 145/78, pulse 63, height 5' 6" (1.676 m), weight 93.8 kg (206 lb 12.7 oz).body mass index is 33.38 kg/m².  Physical Exam   Constitutional: She appears well-developed.   HENT:   Head: Normocephalic.   Eyes: Pupils are equal, round, and reactive to light.   Neck: Normal range of motion. No JVD present.   Cardiovascular: Normal rate. Exam reveals no friction rub.   No murmur heard.  Pulmonary/Chest: Effort normal. No respiratory distress.   Abdominal: Soft. She exhibits no distension. There is no tenderness.   Musculoskeletal: Normal range of motion. She exhibits no edema.   Neurological: She is alert. No cranial nerve deficit.   Skin: Skin is warm. She is not diaphoretic. No erythema.       Labs:    Chemistry        Component Value Date/Time     12/10/2018 1207    K 4.8 12/10/2018 1207     12/10/2018 1207    CO2 27 12/10/2018 " 1207    BUN 31 (H) 12/10/2018 1207    CREATININE 1.3 12/10/2018 1207    GLU 91 12/10/2018 1207        Component Value Date/Time    CALCIUM 9.9 12/10/2018 1207    ALKPHOS 70 12/10/2018 1207    AST 19 12/10/2018 1207    ALT 12 12/10/2018 1207    BILITOT 2.6 (H) 12/10/2018 1207    ESTGFRAFRICA 54.1 (A) 12/10/2018 1207    EGFRNONAA 47.0 (A) 12/10/2018 1207          Magnesium   Date Value Ref Range Status   10/21/2018 1.8 1.6 - 2.6 mg/dL Final     Lab Results   Component Value Date    WBC 5.20 11/19/2018    HGB 8.9 (L) 11/19/2018    HCT 27.9 (L) 11/19/2018     11/19/2018     Lab Results   Component Value Date    INR 1.0 10/21/2018    INR 1.2 09/27/2018    INR 1.1 08/20/2018     BNP   Date Value Ref Range Status   10/21/2018 924 (H) 0 - 99 pg/mL Final     Comment:     Values of less than 100 pg/ml are consistent with non-CHF populations.   09/07/2018 471 (H) 0 - 99 pg/mL Final     Comment:     Values of less than 100 pg/ml are consistent with non-CHF populations.   08/30/2018 2,573 (H) 0 - 99 pg/mL Final     Comment:     Values of less than 100 pg/ml are consistent with non-CHF populations.     LD   Date Value Ref Range Status   06/11/2018 308 (H) 110 - 260 U/L Final     Comment:     Results are increased in hemolyzed samples.   08/23/2016 280 (H) 110 - 260 U/L Final     Comment:     Results are increased in hemolyzed samples.     No results found for this or any previous visit.  No results found for this or any previous visit.    Assessment:      1. Acute on chronic combined systolic and diastolic heart failure    2. Left ventricular hypertrophy    3. NICM (nonischemic cardiomyopathy)    4. Paroxysmal atrial fibrillation    5. Paroxysmal supraventricular tachycardia    6. Chronic kidney disease, stage III (moderate)    7. MELISSA (obstructive sleep apnea)        Plan:   HFrEF FC III NYHA we will increase to 49/51    RTC in 1months.       Patient is now NYHA III  Recommend 2 gram sodium restriction and 1500cc fluid  restriction.  Encourage physical activity with graded exercise program.  Requested patient to weigh themselves daily, and to notify us if their weight increases by more than 3 lbs in 1 day or 5 lbs in 1 week.     Listed for transplant: No

## 2019-01-14 DIAGNOSIS — Z95.810 CARDIAC DEFIBRILLATOR IN PLACE: Primary | ICD-10-CM

## 2019-01-27 PROBLEM — I50.9 HEART FAILURE: Status: ACTIVE | Noted: 2019-01-27

## 2019-01-28 PROBLEM — E87.6 HYPOKALEMIA: Status: ACTIVE | Noted: 2019-01-28

## 2019-01-28 PROBLEM — I50.9 HEART FAILURE: Status: ACTIVE | Noted: 2019-01-28

## 2019-01-28 PROBLEM — I49.9 ARRHYTHMIA: Status: ACTIVE | Noted: 2019-01-28

## 2019-01-31 ENCOUNTER — PATIENT OUTREACH (OUTPATIENT)
Dept: ADMINISTRATIVE | Facility: CLINIC | Age: 54
End: 2019-01-31

## 2019-01-31 NOTE — PATIENT INSTRUCTIONS

## 2019-02-12 ENCOUNTER — CLINICAL SUPPORT (OUTPATIENT)
Dept: CARDIOLOGY | Facility: HOSPITAL | Age: 54
End: 2019-02-12
Attending: INTERNAL MEDICINE
Payer: MEDICAID

## 2019-02-12 DIAGNOSIS — Z95.810 CARDIAC DEFIBRILLATOR IN PLACE: ICD-10-CM

## 2019-02-12 PROCEDURE — 93296 REM INTERROG EVL PM/IDS: CPT | Mod: NTX

## 2019-02-14 ENCOUNTER — LAB VISIT (OUTPATIENT)
Dept: LAB | Facility: HOSPITAL | Age: 54
End: 2019-02-14
Attending: INTERNAL MEDICINE
Payer: MEDICAID

## 2019-02-14 ENCOUNTER — OFFICE VISIT (OUTPATIENT)
Dept: FAMILY MEDICINE | Facility: HOSPITAL | Age: 54
End: 2019-02-14
Attending: FAMILY MEDICINE
Payer: MEDICAID

## 2019-02-14 ENCOUNTER — OFFICE VISIT (OUTPATIENT)
Dept: TRANSPLANT | Facility: CLINIC | Age: 54
End: 2019-02-14
Payer: MEDICAID

## 2019-02-14 VITALS
WEIGHT: 202.38 LBS | BODY MASS INDEX: 32.53 KG/M2 | DIASTOLIC BLOOD PRESSURE: 82 MMHG | SYSTOLIC BLOOD PRESSURE: 159 MMHG | HEART RATE: 54 BPM | HEIGHT: 66 IN

## 2019-02-14 VITALS
SYSTOLIC BLOOD PRESSURE: 135 MMHG | OXYGEN SATURATION: 94 % | BODY MASS INDEX: 32.59 KG/M2 | WEIGHT: 202.81 LBS | HEIGHT: 66 IN | DIASTOLIC BLOOD PRESSURE: 66 MMHG | HEART RATE: 67 BPM

## 2019-02-14 DIAGNOSIS — G47.33 OSA (OBSTRUCTIVE SLEEP APNEA): Chronic | ICD-10-CM

## 2019-02-14 DIAGNOSIS — I50.43 ACUTE ON CHRONIC COMBINED SYSTOLIC AND DIASTOLIC HEART FAILURE: ICD-10-CM

## 2019-02-14 DIAGNOSIS — Z95.810 ICD (IMPLANTABLE CARDIOVERTER-DEFIBRILLATOR) IN PLACE: Primary | Chronic | ICD-10-CM

## 2019-02-14 DIAGNOSIS — I48.0 PAROXYSMAL ATRIAL FIBRILLATION: Chronic | ICD-10-CM

## 2019-02-14 DIAGNOSIS — I42.8 NICM (NONISCHEMIC CARDIOMYOPATHY): Chronic | ICD-10-CM

## 2019-02-14 DIAGNOSIS — I50.42 CHRONIC COMBINED SYSTOLIC AND DIASTOLIC HEART FAILURE: Primary | ICD-10-CM

## 2019-02-14 DIAGNOSIS — J43.8 OTHER EMPHYSEMA: Chronic | ICD-10-CM

## 2019-02-14 DIAGNOSIS — I47.10 PAROXYSMAL SUPRAVENTRICULAR TACHYCARDIA: Chronic | ICD-10-CM

## 2019-02-14 DIAGNOSIS — M79.7 FIBROMYALGIA AFFECTING MULTIPLE SITES: Chronic | ICD-10-CM

## 2019-02-14 DIAGNOSIS — N18.30 CHRONIC KIDNEY DISEASE, STAGE III (MODERATE): Chronic | ICD-10-CM

## 2019-02-14 DIAGNOSIS — I51.7 LEFT VENTRICULAR HYPERTROPHY: Chronic | ICD-10-CM

## 2019-02-14 PROBLEM — E87.6 HYPOKALEMIA: Status: RESOLVED | Noted: 2019-01-28 | Resolved: 2019-02-14

## 2019-02-14 PROBLEM — R07.9 CHEST PAIN: Status: RESOLVED | Noted: 2018-11-17 | Resolved: 2019-02-14

## 2019-02-14 LAB
ACANTHOCYTES BLD QL SMEAR: PRESENT
ALBUMIN SERPL BCP-MCNC: 3.6 G/DL
ALP SERPL-CCNC: 68 U/L
ALT SERPL W/O P-5'-P-CCNC: 11 U/L
ANION GAP SERPL CALC-SCNC: 7 MMOL/L
ANISOCYTOSIS BLD QL SMEAR: ABNORMAL
AST SERPL-CCNC: 15 U/L
BASO STIPL BLD QL SMEAR: ABNORMAL
BASOPHILS # BLD AUTO: 0.04 K/UL
BASOPHILS NFR BLD: 0.6 %
BILIRUB SERPL-MCNC: 1.9 MG/DL
BNP SERPL-MCNC: 301 PG/ML
BUN SERPL-MCNC: 32 MG/DL
CALCIUM SERPL-MCNC: 9.7 MG/DL
CHLORIDE SERPL-SCNC: 109 MMOL/L
CO2 SERPL-SCNC: 27 MMOL/L
CREAT SERPL-MCNC: 1.3 MG/DL
DACRYOCYTES BLD QL SMEAR: ABNORMAL
DIFFERENTIAL METHOD: ABNORMAL
EOSINOPHIL # BLD AUTO: 0.2 K/UL
EOSINOPHIL NFR BLD: 2.8 %
ERYTHROCYTE [DISTWIDTH] IN BLOOD BY AUTOMATED COUNT: 25.5 %
EST. GFR  (AFRICAN AMERICAN): 54.1 ML/MIN/1.73 M^2
EST. GFR  (NON AFRICAN AMERICAN): 47 ML/MIN/1.73 M^2
GLUCOSE SERPL-MCNC: 124 MG/DL
HCT VFR BLD AUTO: 30.8 %
HGB BLD-MCNC: 9.3 G/DL
HYPOCHROMIA BLD QL SMEAR: ABNORMAL
IMM GRANULOCYTES # BLD AUTO: 0.02 K/UL
IMM GRANULOCYTES NFR BLD AUTO: 0.3 %
LYMPHOCYTES # BLD AUTO: 1.2 K/UL
LYMPHOCYTES NFR BLD: 17.9 %
MCH RBC QN AUTO: 17.1 PG
MCHC RBC AUTO-ENTMCNC: 30.2 G/DL
MCV RBC AUTO: 57 FL
MONOCYTES # BLD AUTO: 0.6 K/UL
MONOCYTES NFR BLD: 8.6 %
NEUTROPHILS # BLD AUTO: 4.5 K/UL
NEUTROPHILS NFR BLD: 69.8 %
NRBC BLD-RTO: 0 /100 WBC
OVALOCYTES BLD QL SMEAR: ABNORMAL
PLATELET # BLD AUTO: 194 K/UL
PLATELET BLD QL SMEAR: ABNORMAL
PMV BLD AUTO: ABNORMAL FL
POIKILOCYTOSIS BLD QL SMEAR: ABNORMAL
POLYCHROMASIA BLD QL SMEAR: ABNORMAL
POTASSIUM SERPL-SCNC: 4.4 MMOL/L
PROT SERPL-MCNC: 7.6 G/DL
RBC # BLD AUTO: 5.43 M/UL
SCHISTOCYTES BLD QL SMEAR: ABNORMAL
SCHISTOCYTES BLD QL SMEAR: PRESENT
SODIUM SERPL-SCNC: 143 MMOL/L
SPHEROCYTES BLD QL SMEAR: ABNORMAL
TARGETS BLD QL SMEAR: ABNORMAL
WBC # BLD AUTO: 6.43 K/UL

## 2019-02-14 PROCEDURE — 99215 OFFICE O/P EST HI 40 MIN: CPT | Mod: S$PBB,NTX,, | Performed by: INTERNAL MEDICINE

## 2019-02-14 PROCEDURE — 83880 ASSAY OF NATRIURETIC PEPTIDE: CPT | Mod: NTX

## 2019-02-14 PROCEDURE — 99213 OFFICE O/P EST LOW 20 MIN: CPT | Mod: PBBFAC,TXP | Performed by: INTERNAL MEDICINE

## 2019-02-14 PROCEDURE — 99215 PR OFFICE/OUTPT VISIT, EST, LEVL V, 40-54 MIN: ICD-10-PCS | Mod: S$PBB,NTX,, | Performed by: INTERNAL MEDICINE

## 2019-02-14 PROCEDURE — 85025 COMPLETE CBC W/AUTO DIFF WBC: CPT | Mod: NTX

## 2019-02-14 PROCEDURE — 99999 PR PBB SHADOW E&M-EST. PATIENT-LVL III: ICD-10-PCS | Mod: PBBFAC,TXP,, | Performed by: INTERNAL MEDICINE

## 2019-02-14 PROCEDURE — 99999 PR PBB SHADOW E&M-EST. PATIENT-LVL III: CPT | Mod: PBBFAC,TXP,, | Performed by: INTERNAL MEDICINE

## 2019-02-14 PROCEDURE — 36415 COLL VENOUS BLD VENIPUNCTURE: CPT | Mod: NTX

## 2019-02-14 PROCEDURE — 80053 COMPREHEN METABOLIC PANEL: CPT | Mod: TXP

## 2019-02-14 PROCEDURE — 99214 OFFICE O/P EST MOD 30 MIN: CPT | Mod: 27 | Performed by: STUDENT IN AN ORGANIZED HEALTH CARE EDUCATION/TRAINING PROGRAM

## 2019-02-14 RX ORDER — AMITRIPTYLINE HYDROCHLORIDE 25 MG/1
25 TABLET, FILM COATED ORAL NIGHTLY PRN
Qty: 30 TABLET | Refills: 1 | Status: SHIPPED | OUTPATIENT
Start: 2019-02-14 | End: 2019-04-30

## 2019-02-14 NOTE — LETTER
February 14, 2019        Andrea Pacheco  [address]  [address]  [facility] [address]  Phone: [phone]  Fax: [phone]             [facility] [address] [place] [address]  Phone: [phone]   Patient: Hafsa Hawley   MR Number: [id number]   YOB: 1965   Date of Visit: 2/14/2019       Dear Dr. Andrea Pacheco    Thank you for referring Hafsa Hawley to me for evaluation. Attached you will find relevant portions of my assessment and plan of care.    If you have questions, please do not hesitate to call me. I look forward to following Hafsa Hawley along with you.    Sincerely,    Armando Garcia MD    Enclosure    If you would like to receive this communication electronically, please contact [email or web address] or [phone] to request [employer] Link access.    [employer] Link is a tool which provides read-only access to select patient information with whom you have a relationship. Its easy to use and provides real time access to review your patients record including encounter summaries, notes, results, and demographic information.    If you feel you have received this communication in error or would no longer like to receive these types of communications, please e-mail [email or web address]

## 2019-02-14 NOTE — PROGRESS NOTES
Subjective:     HPI:  Ms. Hawley is a 53 y.o. year old Black or  female who has presents for one month follow up.Seen by Dr Lorenzo to be evaluated as a potential heart transplant recipient (referred by Junior / Dana)   At her initial visit in July 2018, she was told to complete her pheochromocytoma. She was seen by [facility] who feel she is unlikely to have pheochromocytoma  They recommend that we can pursue heart transplant workup.    She is here for follow up after hospitalization. Doing better now mild SOB FC II NYHA    Patient was placed on I.V Lasix and this improved SOB and currently home lasix increased to 80 mg BID. Small dose aldactone added too. Patient asked to take extra dose lasix for gain of weight as advised before. She did not take extra dose for past 2 months as thought weight gain was sec to change in diet habits and her gaining weight and not fluid. Also asked to be compliant with low salt diet(was not for past 2 months as she was with family or went out to eat). A flutter on admit sec to acute on chronic Combined HF. Was on Amiodarone drip for only few hours and then had sinus bradycardia. Currently in NSR and rate controlled with home dose Coreg. Cont Eliquis. She is now being d/beronica home in a stable condition and cont to f/u with Cardiology as out patient.          PREVIOUS HISTORY    Patient most recently admitted at [facility] with ADHF, BNP significantly elevated at 2573, had to be diuresed, additionally had episode of VT x 1, and chest pain overnight. Had elevated troponin as well at around .6, felt to be due to ADHF and tachycardia. Bili was elevated as well, bili all the way up to 5.6. Last visit was sent to hepatology and they plan to do US  elastography but felt ok to proceed with LVAD/OHT workup. Last visit she was to increase isordil to 2 tablets TID but did not do this. Today, feels less SOB. Has some mild musculoskeletal pains she feels is related  to her AICD.      TTE 08/31/18:    1 - Severely depressed left ventricular systolic function (EF 20-25%).     2 - Impaired LV relaxation, elevated LAP (grade 2 diastolic dysfunction).     3 - Pulmonary hypertension. The estimated PA systolic pressure is 43 mmHg.     4 - Mild mitral regurgitation.     5 - Increased central venous pressure.     6 - Moderate pulmonic regurgitation.     7- LVH     Six minute walk 06/21/2018---------Distance: 292.61 meters (960 feet)     O2 Sat % Supplemental Oxygen Heart Rate Blood Pressure Haseeb   Scale   Pre-exercise  (Resting) 98 % Room Air 70 bpm 173/97 mmHg 0.5   During Exercise 95 % Room Air 80 bpm 143/100 mmHg 5-6   Post-exercise  (Recovery) 98 % Room Air  68 bpm 141/93 mmHg       FINAL PATHOLOGIC DIAGNOSIS  Liver, random, biopsy:  - Minimal steatosis, predominantly macrovesicular, 1%.  - Minimal nonspecific portal inflammation.  - No significant fibrosis seen.  - PAS-D and copper pending, results will be issued in an addendum.  - See comment.  COMMENT: The biopsy is adequate for evaluation. The patient's history of elevated total bilirubin is noted and  select information from the electronic medical record is reviewed. The biopsy is relatively unremarkable with  minimal steatosis and nonspecific portal inflammation. There is no definitive steatohepatitis. There is no  cholestasis or ductular reaction seen. The bile ducts are unremarkable. There is no significant plasma cell  population seen. There are no granulomas seen. There is no increased iron on iron stain. There is no increased  fibrosis on trichrome stain. There is no clear etiology for elevated bilirubin seen on this biopsy. Appropriate positive  controls are examined.  Diagnosed      Past Medical History:   Diagnosis Date    Anemia     Anticoagulant long-term use     Arthritis     Atrial fibrillation     Congestive heart failure     COPD (chronic obstructive pulmonary disease)     Deep vein thrombosis      "elevated bilirubin d/t Gilbert's syndrome     confirmed by [place] genetic testing, evaluated by hepatology    Encounter for blood transfusion     GERD (gastroesophageal reflux disease)     Hypertension     Pheochromocytoma, malignant     Right kidney mass     Sleep apnea     Thyroid disease      Past Surgical History:   Procedure Laterality Date    APPENDECTOMY      BIOPSY, LIVER, TRANSJUGULAR APPROACH N/A 10/24/2018    Performed by [facility] at [facility] OR [facility] FLR    CARDIAC DEFIBRILLATOR PLACEMENT Left 12/2016    EYE SURGERY      due to running tears    FRACTURE SURGERY Left     hand 5th digit    HEART CATH-RIGHT Right 7/22/2016    [facility]    HYSTERECTOMY      INSERTION-ICD-SINGLE N/A 12/2/2016    Performed by Bob Pretty MD at [facility] CATH LAB    KNEE SURGERY Left 2016    hematoma    LIVER BIOPSY  10/24/2018    Minimal steatosis, predominantly macrovesicular, 1%, Minimal nonspecific portal inflammation, no fibrosis. No findings on biopsy to explain elevated bilirubin levels. Could be d/t Gilbert's =?- hemolysis    PROCEDURE PROLAPSE HEMORRHOID (PPH) N/A 3/7/2017    [facility]    Right heart cath Right 2/6/2018    [facility]     OB History     No data available        Review of Systems   Constitution: Negative for chills, decreased appetite, diaphoresis, fever, weakness and weight gain.   Cardiovascular: Positive for dyspnea on exertion. Negative for chest pain, claudication, cyanosis and paroxysmal nocturnal dyspnea.   Respiratory: Negative for cough, hemoptysis, shortness of breath, sleep disturbances due to breathing and snoring.    Skin: Negative for nail changes.   Gastrointestinal: Negative for bloating, nausea and vomiting.   Psychiatric/Behavioral: Negative for depression.       Objective:   Blood pressure (!) 159/82, pulse (!) 54, height 5' 6" (1.676 m), weight " 91.8 kg (202 lb 6.1 oz).body mass index is 32.67 kg/m².  Physical Exam   Constitutional: She appears well-developed.   HENT:   Head: Normocephalic.   Eyes: Pupils are equal, round, and reactive to light.   Neck: Normal range of motion. No JVD present.   Cardiovascular: Normal rate. Exam reveals no friction rub.   No murmur heard.  Pulmonary/Chest: Effort normal. No respiratory distress.   Abdominal: Soft. She exhibits no distension. There is no tenderness.   Musculoskeletal: Normal range of motion. She exhibits no edema.   Neurological: She is alert. No cranial nerve deficit.   Skin: Skin is warm. She is not diaphoretic. No erythema.       Labs:    Chemistry        Component Value Date/Time     01/29/2019 0708    K 3.6 01/29/2019 0708     01/29/2019 0708    CO2 25 01/29/2019 0708    BUN 31 (H) 01/29/2019 0708    CREATININE 1.05 01/29/2019 0708     (H) 01/29/2019 0708        Component Value Date/Time    CALCIUM 9.1 01/29/2019 0708    ALKPHOS 62 01/27/2019 2135    AST 27 01/27/2019 2135    ALT 16 01/27/2019 2135    BILITOT 1.8 (H) 01/27/2019 2135    ESTGFRAFRICA >60.0 01/29/2019 0708    EGFRNONAA >60.0 01/29/2019 0708          Magnesium   Date Value Ref Range Status   01/28/2019 1.6 1.6 - 2.6 mg/dL Final     Lab Results   Component Value Date    WBC 6.43 02/14/2019    HGB 9.3 (L) 02/14/2019    HCT 30.8 (L) 02/14/2019     02/14/2019     Lab Results   Component Value Date    INR 1.2 01/27/2019    INR 1.0 10/21/2018    INR 1.2 09/27/2018     BNP   Date Value Ref Range Status   10/21/2018 924 (H) 0 - 99 pg/mL Final     Comment:     Values of less than 100 pg/ml are consistent with non-CHF populations.   09/07/2018 471 (H) 0 - 99 pg/mL Final     Comment:     Values of less than 100 pg/ml are consistent with non-CHF populations.   08/30/2018 2,573 (H) 0 - 99 pg/mL Final     Comment:     Values of less than 100 pg/ml are consistent with non-CHF populations.     LD   Date Value Ref Range Status    06/11/2018 308 (H) 110 - 260 U/L Final     Comment:     Results are increased in hemolyzed samples.   08/23/2016 280 (H) 110 - 260 U/L Final     Comment:     Results are increased in hemolyzed samples.     No results found for this or any previous visit.  No results found for this or any previous visit.    Assessment:      1. ICD (implantable cardioverter-defibrillator) in place    2. Left ventricular hypertrophy    3. NICM (nonischemic cardiomyopathy)    4. Paroxysmal atrial fibrillation    5. Paroxysmal supraventricular tachycardia    6. Acute on chronic combined systolic and diastolic heart failure    7. Other emphysema    8. Chronic kidney disease, stage III (moderate)    9. MELISSA (obstructive sleep apnea)        Plan:   HFrEF FC III NYHA  On entresto 49/51    It seems that decompensation could have related to AFIB SVT I will ask Dr Pretty to see her    RTC in 1months.       Patient is now NYHA III  Recommend 2 gram sodium restriction and 1500cc fluid restriction.  Encourage physical activity with graded exercise program.  Requested patient to weigh themselves daily, and to notify us if their weight increases by more than 3 lbs in 1 day or 5 lbs in 1 week.     Listed for transplant: No

## 2019-02-17 NOTE — PROGRESS NOTES
Case discussed with resident at time of visit.  I have reviewed and concur with the resident's evaluation, assessment, and plan.  Needs CPAP for MELISSA.  May need mask refitted.

## 2019-02-18 ENCOUNTER — TELEPHONE (OUTPATIENT)
Dept: FAMILY MEDICINE | Facility: HOSPITAL | Age: 54
End: 2019-02-18

## 2019-02-21 ENCOUNTER — TELEPHONE (OUTPATIENT)
Dept: FAMILY MEDICINE | Facility: HOSPITAL | Age: 54
End: 2019-02-21

## 2019-02-21 DIAGNOSIS — R53.1 WEAKNESS: ICD-10-CM

## 2019-02-21 DIAGNOSIS — Z12.11 SCREEN FOR COLON CANCER: Primary | ICD-10-CM

## 2019-02-21 DIAGNOSIS — Z12.39 SCREENING BREAST EXAMINATION: ICD-10-CM

## 2019-02-21 DIAGNOSIS — M79.7 FIBROMYALGIA: ICD-10-CM

## 2019-02-21 RX ORDER — ACETAMINOPHEN 325 MG/1
650 TABLET ORAL EVERY 6 HOURS PRN
Qty: 90 TABLET | Refills: 3 | Status: SHIPPED | OUTPATIENT
Start: 2019-02-21 | End: 2019-04-18

## 2019-02-21 NOTE — TELEPHONE ENCOUNTER
----- Message from Josr Gray sent at 2/21/2019 11:10 AM CST -----  PT NEED DR CORDON TO PLEASE GIVE HER A CALL AS SOON AS POSSIBLE. SHE'S IN A LOT OF PAIN [phone]

## 2019-02-21 NOTE — PROGRESS NOTES
Called patient  She is having diffuse body aches  She would like colonoscopy, Mammo and pap.  She would like a shower chair 2/2 weakness.    Referral to Gi placed for screening colonoscopy,mammogram ordered, patient advised to f/u in clinic in 2-4 weeks for pap and pt/ot ordered. tylenol prn pain and c/w elavil.     Aster Paz D.O.  [facility] HO-3  02/21/2019

## 2019-02-22 NOTE — TELEPHONE ENCOUNTER
----- Message from Micaela Salvador sent at 2/22/2019 12:07 PM CST -----  Pt needs something stronger than acetaminophen (TYLENOL) 325 MG tablet. Please call pt ASAP

## 2019-02-27 ENCOUNTER — TELEPHONE (OUTPATIENT)
Dept: FAMILY MEDICINE | Facility: HOSPITAL | Age: 54
End: 2019-02-27

## 2019-02-27 NOTE — TELEPHONE ENCOUNTER
----- Message from Josr Gray sent at 2/25/2019  2:36 PM CST -----  PT NEED DR CORDON TO GIVE HER A CALL AS SOON AS POSSIBLE PLEASE IN A LOT OF PAIN

## 2019-03-11 ENCOUNTER — OFFICE VISIT (OUTPATIENT)
Dept: TRANSPLANT | Facility: CLINIC | Age: 54
End: 2019-03-11
Payer: MEDICAID

## 2019-03-11 ENCOUNTER — TELEPHONE (OUTPATIENT)
Dept: FAMILY MEDICINE | Facility: HOSPITAL | Age: 54
End: 2019-03-11

## 2019-03-11 VITALS
HEART RATE: 64 BPM | SYSTOLIC BLOOD PRESSURE: 120 MMHG | HEIGHT: 66 IN | DIASTOLIC BLOOD PRESSURE: 64 MMHG | BODY MASS INDEX: 33.06 KG/M2 | WEIGHT: 205.69 LBS

## 2019-03-11 DIAGNOSIS — Z95.810 ICD (IMPLANTABLE CARDIOVERTER-DEFIBRILLATOR) IN PLACE: Chronic | ICD-10-CM

## 2019-03-11 DIAGNOSIS — N18.30 CHRONIC KIDNEY DISEASE, STAGE III (MODERATE): Chronic | ICD-10-CM

## 2019-03-11 DIAGNOSIS — I47.10 PAROXYSMAL SUPRAVENTRICULAR TACHYCARDIA: Chronic | ICD-10-CM

## 2019-03-11 DIAGNOSIS — G47.33 OSA (OBSTRUCTIVE SLEEP APNEA): Chronic | ICD-10-CM

## 2019-03-11 DIAGNOSIS — I42.8 NICM (NONISCHEMIC CARDIOMYOPATHY): Chronic | ICD-10-CM

## 2019-03-11 DIAGNOSIS — I10 ESSENTIAL HYPERTENSION: Chronic | ICD-10-CM

## 2019-03-11 DIAGNOSIS — I48.0 PAROXYSMAL ATRIAL FIBRILLATION: Chronic | ICD-10-CM

## 2019-03-11 DIAGNOSIS — I50.42 CHRONIC COMBINED SYSTOLIC AND DIASTOLIC HEART FAILURE: Primary | ICD-10-CM

## 2019-03-11 DIAGNOSIS — I51.7 LEFT VENTRICULAR HYPERTROPHY: Chronic | ICD-10-CM

## 2019-03-11 PROCEDURE — 99215 PR OFFICE/OUTPT VISIT, EST, LEVL V, 40-54 MIN: ICD-10-PCS | Mod: S$PBB,NTX,, | Performed by: INTERNAL MEDICINE

## 2019-03-11 PROCEDURE — 99999 PR PBB SHADOW E&M-EST. PATIENT-LVL II: ICD-10-PCS | Mod: PBBFAC,TXP,, | Performed by: INTERNAL MEDICINE

## 2019-03-11 PROCEDURE — 99999 PR PBB SHADOW E&M-EST. PATIENT-LVL II: CPT | Mod: PBBFAC,TXP,, | Performed by: INTERNAL MEDICINE

## 2019-03-11 PROCEDURE — 99212 OFFICE O/P EST SF 10 MIN: CPT | Mod: PBBFAC,NTX | Performed by: INTERNAL MEDICINE

## 2019-03-11 PROCEDURE — 99215 OFFICE O/P EST HI 40 MIN: CPT | Mod: S$PBB,NTX,, | Performed by: INTERNAL MEDICINE

## 2019-03-11 NOTE — TELEPHONE ENCOUNTER
----- Message from Micaela Salvador sent at 3/11/2019  2:11 PM CDT -----  Contact: [phone] [employer] Ref #[id number]  [employer] calling to get an update on the written order sent in last week. Please call Edwige @ [phone]

## 2019-03-11 NOTE — PROGRESS NOTES
Subjective:     HPI:  Ms. Hawley is a 53 y.o. year old Black or  female who has presents for one month follow up.Seen by Dr Lorenzo to be evaluated as a potential heart transplant recipient (referred by Junior / Dana)   At her initial visit in July 2018, she was told to complete her pheochromocytoma. She was seen by [facility] who feel she is unlikely to have pheochromocytoma  They recommend that we can pursue heart transplant workup.    She is here for follow up after hospitalization. Doing better now mild SOB FC II NYHA.    Since last visit she has not seen EP. WE will try to schedule    Patient was placed on I.V Lasix and this improved SOB and currently home lasix increased to 80 mg BID. Small dose aldactone added too. Patient asked to take extra dose lasix for gain of weight as advised before. She did not take extra dose for past 2 months as thought weight gain was sec to change in diet habits and her gaining weight and not fluid. Also asked to be compliant with low salt diet(was not for past 2 months as she was with family or went out to eat). A flutter on admit sec to acute on chronic Combined HF. Was on Amiodarone drip for only few hours and then had sinus bradycardia. Currently in NSR and rate controlled with home dose Coreg. Cont Eliquis. She is now being d/beronica home in a stable condition and cont to f/u with Cardiology as out patient.      PREVIOUS HISTORY    Patient most recently admitted at [facility] with ADHF, BNP significantly elevated at 2573, had to be diuresed, additionally had episode of VT x 1, and chest pain overnight. Had elevated troponin as well at around .6, felt to be due to ADHF and tachycardia. Bili was elevated as well, bili all the way up to 5.6. Last visit was sent to hepatology and they plan to do US  elastography but felt ok to proceed with LVAD/OHT workup. Last visit she was to increase isordil to 2 tablets TID but did not do this. Today, feels less  SOB. Has some mild musculoskeletal pains she feels is related to her AICD.      TTE 08/31/18:    1 - Severely depressed left ventricular systolic function (EF 20-25%).     2 - Impaired LV relaxation, elevated LAP (grade 2 diastolic dysfunction).     3 - Pulmonary hypertension. The estimated PA systolic pressure is 43 mmHg.     4 - Mild mitral regurgitation.     5 - Increased central venous pressure.     6 - Moderate pulmonic regurgitation.     7- LVH     Six minute walk 06/21/2018---------Distance: 292.61 meters (960 feet)     O2 Sat % Supplemental Oxygen Heart Rate Blood Pressure Haseeb   Scale   Pre-exercise  (Resting) 98 % Room Air 70 bpm 173/97 mmHg 0.5   During Exercise 95 % Room Air 80 bpm 143/100 mmHg 5-6   Post-exercise  (Recovery) 98 % Room Air  68 bpm 141/93 mmHg       FINAL PATHOLOGIC DIAGNOSIS  Liver, random, biopsy:  - Minimal steatosis, predominantly macrovesicular, 1%.  - Minimal nonspecific portal inflammation.  - No significant fibrosis seen.  - PAS-D and copper pending, results will be issued in an addendum.  - See comment.  COMMENT: The biopsy is adequate for evaluation. The patient's history of elevated total bilirubin is noted and  select information from the electronic medical record is reviewed. The biopsy is relatively unremarkable with  minimal steatosis and nonspecific portal inflammation. There is no definitive steatohepatitis. There is no  cholestasis or ductular reaction seen. The bile ducts are unremarkable. There is no significant plasma cell  population seen. There are no granulomas seen. There is no increased iron on iron stain. There is no increased  fibrosis on trichrome stain. There is no clear etiology for elevated bilirubin seen on this biopsy. Appropriate positive  controls are examined.  Diagnosed      Past Medical History:   Diagnosis Date    Anemia     Anticoagulant long-term use     Arthritis     Atrial fibrillation     Congestive heart failure     COPD (chronic  obstructive pulmonary disease)     Deep vein thrombosis     elevated bilirubin d/t Gilbert's syndrome     confirmed by [facility] genetic testing, evaluated by hepatology    Encounter for blood transfusion     GERD (gastroesophageal reflux disease)     Hypertension     Pheochromocytoma, malignant     Right kidney mass     Sleep apnea     Thyroid disease      Past Surgical History:   Procedure Laterality Date    APPENDECTOMY      BIOPSY, LIVER, TRANSJUGULAR APPROACH N/A 10/24/2018    Performed by [facility] Provider at [facility] OR 2ND FLR    CARDIAC DEFIBRILLATOR PLACEMENT Left 2016    EYE SURGERY      due to running tears    FRACTURE SURGERY Left     hand 5th digit    HEART CATH-RIGHT Right 2016    Performed by Rakesh Garcia MD at [facility] CATH LAB    HYSTERECTOMY      INSERTION-ICD-SINGLE N/A 2016    Performed by Bob Pretty MD at [facility] CATH LAB    KNEE SURGERY Left 2016    hematoma    LIVER BIOPSY  10/24/2018    Minimal steatosis, predominantly macrovesicular, 1%, Minimal nonspecific portal inflammation, no fibrosis. No findings on biopsy to explain elevated bilirubin levels. Could be d/t Gilbert's =?- hemolysis    PROCEDURE PROLAPSE HEMORRHOID (PPH) N/A 3/7/2017    Performed by GEORGE Rothman MD at [facility] OR    Right heart cath Right 2018    Performed by Benson Cortez MD at [facility] CATH LAB     OB History      Para Term  AB Living    2 2 2          SAB TAB Ectopic Multiple Live Births                     Review of Systems   Constitution: Negative for chills, decreased appetite, diaphoresis, fever, weakness and weight gain.   Cardiovascular: Positive for dyspnea on exertion. Negative for chest pain, claudication, cyanosis and paroxysmal nocturnal dyspnea.   Respiratory: Negative for cough, hemoptysis, shortness of breath, sleep disturbances due to breathing and snoring.    Skin: Negative for nail changes.   Gastrointestinal: Negative for bloating, nausea and  vomiting.   Psychiatric/Behavioral: Negative for depression.       Objective:   There were no vitals taken for this visit.body mass index is unknown because there is no height or weight on file.  Physical Exam   Constitutional: She appears well-developed.   HENT:   Head: Normocephalic.   Eyes: Pupils are equal, round, and reactive to light.   Neck: Normal range of motion. No JVD present.   Cardiovascular: Normal rate. Exam reveals no friction rub.   No murmur heard.  Pulmonary/Chest: Effort normal. No respiratory distress.   Abdominal: Soft. She exhibits no distension. There is no tenderness.   Musculoskeletal: Normal range of motion. She exhibits no edema.   Neurological: She is alert. No cranial nerve deficit.   Skin: Skin is warm. She is not diaphoretic. No erythema.       Labs:    Chemistry        Component Value Date/Time     02/14/2019 1040    K 4.4 02/14/2019 1040     02/14/2019 1040    CO2 27 02/14/2019 1040    BUN 32 (H) 02/14/2019 1040    CREATININE 1.3 02/14/2019 1040     (H) 02/14/2019 1040        Component Value Date/Time    CALCIUM 9.7 02/14/2019 1040    ALKPHOS 68 02/14/2019 1040    AST 15 02/14/2019 1040    ALT 11 02/14/2019 1040    BILITOT 1.9 (H) 02/14/2019 1040    ESTGFRAFRICA 54.1 (A) 02/14/2019 1040    EGFRNONAA 47.0 (A) 02/14/2019 1040          Magnesium   Date Value Ref Range Status   01/28/2019 1.6 1.6 - 2.6 mg/dL Final     Lab Results   Component Value Date    WBC 6.43 02/14/2019    HGB 9.3 (L) 02/14/2019    HCT 30.8 (L) 02/14/2019     02/14/2019     Lab Results   Component Value Date    INR 1.2 01/27/2019    INR 1.0 10/21/2018    INR 1.2 09/27/2018     BNP   Date Value Ref Range Status   02/14/2019 301 (H) 0 - 99 pg/mL Final     Comment:     Values of less than 100 pg/ml are consistent with non-CHF populations.   10/21/2018 924 (H) 0 - 99 pg/mL Final     Comment:     Values of less than 100 pg/ml are consistent with non-CHF populations.   09/07/2018 333 (H) 0 - 99  pg/mL Final     Comment:     Values of less than 100 pg/ml are consistent with non-CHF populations.     LD   Date Value Ref Range Status   06/11/2018 308 (H) 110 - 260 U/L Final     Comment:     Results are increased in hemolyzed samples.   08/23/2016 280 (H) 110 - 260 U/L Final     Comment:     Results are increased in hemolyzed samples.     No results found for this or any previous visit.  No results found for this or any previous visit.    Assessment:      1. Chronic combined systolic and diastolic heart failure    2. Essential hypertension    3. ICD (implantable cardioverter-defibrillator) in place    4. Left ventricular hypertrophy    5. NICM (nonischemic cardiomyopathy)    6. Paroxysmal atrial fibrillation    7. Paroxysmal supraventricular tachycardia    8. Chronic kidney disease, stage III (moderate)    9. MELISSA (obstructive sleep apnea)        Plan:   HFrEF FC III NYHA  Stable On entresto 49/51    It seems that decompensation could have related to AFIB SVT I will ask Dr Pretty to see her    RTC in 2 months.       Patient is now NYHA III  Recommend 2 gram sodium restriction and 1500cc fluid restriction.  Encourage physical activity with graded exercise program.  Requested patient to weigh themselves daily, and to notify us if their weight increases by more than 3 lbs in 1 day or 5 lbs in 1 week.     Listed for transplant: No

## 2019-03-11 NOTE — LETTER
March 11, 2019        Andrea Pacheco  [address]  [address]  [place] [address]  Phone: [phone]  Fax: [phone]             [facility] [address] [place] [address]  Phone: [phone]   Patient: Hafsa Hawley   MR Number: [id number]   YOB: 1965   Date of Visit: 3/11/2019       Dear Dr. Andrea Pacheco    Thank you for referring Hafsa Hawley to me for evaluation. Attached you will find relevant portions of my assessment and plan of care.    If you have questions, please do not hesitate to call me. I look forward to following Hafsa Hawley along with you.    Sincerely,    Armando Garcia MD    Enclosure    If you would like to receive this communication electronically, please contact [email or web address] or [phone] to request [employer] Link access.    [employer] Link is a tool which provides read-only access to select patient information with whom you have a relationship. Its easy to use and provides real time access to review your patients record including encounter summaries, notes, results, and demographic information.    If you feel you have received this communication in error or would no longer like to receive these types of communications, please e-mail [email or web address]

## 2019-03-25 RX ORDER — SACUBITRIL AND VALSARTAN 49; 51 MG/1; MG/1
TABLET, FILM COATED ORAL
Qty: 60 TABLET | Refills: 0 | Status: SHIPPED | OUTPATIENT
Start: 2019-03-25 | End: 2019-04-22 | Stop reason: SDUPTHER

## 2019-03-29 ENCOUNTER — OFFICE VISIT (OUTPATIENT)
Dept: GASTROENTEROLOGY | Facility: CLINIC | Age: 54
End: 2019-03-29
Payer: MEDICAID

## 2019-03-29 VITALS
WEIGHT: 205 LBS | HEART RATE: 80 BPM | BODY MASS INDEX: 32.95 KG/M2 | HEIGHT: 66 IN | SYSTOLIC BLOOD PRESSURE: 110 MMHG | DIASTOLIC BLOOD PRESSURE: 78 MMHG

## 2019-03-29 DIAGNOSIS — Z12.11 SCREEN FOR COLON CANCER: Primary | ICD-10-CM

## 2019-03-29 DIAGNOSIS — I42.8 NICM (NONISCHEMIC CARDIOMYOPATHY): Chronic | ICD-10-CM

## 2019-03-29 DIAGNOSIS — R21 RASH: ICD-10-CM

## 2019-03-29 PROCEDURE — 99214 PR OFFICE/OUTPT VISIT, EST, LEVL IV, 30-39 MIN: ICD-10-PCS | Mod: S$PBB,NTX,, | Performed by: INTERNAL MEDICINE

## 2019-03-29 PROCEDURE — 99213 OFFICE O/P EST LOW 20 MIN: CPT | Mod: PBBFAC,PO,TXP | Performed by: INTERNAL MEDICINE

## 2019-03-29 PROCEDURE — 99999 PR PBB SHADOW E&M-EST. PATIENT-LVL III: ICD-10-PCS | Mod: PBBFAC,TXP,, | Performed by: INTERNAL MEDICINE

## 2019-03-29 PROCEDURE — 99999 PR PBB SHADOW E&M-EST. PATIENT-LVL III: CPT | Mod: PBBFAC,TXP,, | Performed by: INTERNAL MEDICINE

## 2019-03-29 PROCEDURE — 99214 OFFICE O/P EST MOD 30 MIN: CPT | Mod: S$PBB,NTX,, | Performed by: INTERNAL MEDICINE

## 2019-03-29 RX ORDER — POLYETHYLENE GLYCOL 3350, SODIUM SULFATE ANHYDROUS, SODIUM BICARBONATE, SODIUM CHLORIDE, POTASSIUM CHLORIDE 236; 22.74; 6.74; 5.86; 2.97 G/4L; G/4L; G/4L; G/4L; G/4L
POWDER, FOR SOLUTION ORAL
Qty: 1 BOTTLE | Refills: 0 | Status: SHIPPED | OUTPATIENT
Start: 2019-03-29 | End: 2019-05-20

## 2019-03-29 NOTE — PATIENT INSTRUCTIONS
GOLYTELY/ COLYTE/ NULYTELY Instructions    You are scheduled for a colonoscopy with Dr. Boykin on Tuesday, April 23 at [facility] located at [facility] in [place].  Enter through the [facility] Entrance.  Check-in at Same Day Surgery.      You will receive a call 2-3 days before your colonoscopy to tell you the time to arrive.  If you have not received a call by the day before your procedure, call the Endoscopy Lab at [phone].    To ensure that your test is accurate and complete, you MUST follow these instructions listed below.  If you have any questions, please call our office at [phone].  Plan on being at the hospital for your procedure for 3-4 hours.    1.  Follow a CLEAR LIQUID DIET for the entire day before your scheduled colonoscopy.  This means no solid food the entire day starting when you wake.  You may have as much of the clear liquids as you want throughout the day.   CLEAR LIQUID DIET:   - Avoid Red, Orange, Purple, and/or Blue food coloring   - NO DAIRY   - You can have:  Coffee with sugar (no creamer), tea, water, soda, apple or white grape juice, chicken or beef broth/bouillon (no meat, noodles, or veggies), green/yellow popsicles, green/yellow Jell-O, lemonade.    2.  MIX GOLYTELY/COLYTE/NULYTELY (all names for same product) WITH ONE (1) GALLON OF WATER.  YOU MAY ADD A FLAVOR PACKET OR YELLOW/GREEN POWDER DRINK MIX TO THIS.  PUT IN REFRIGERATOR.  This is easier to drink if this solution is cold, so you can mix the solution one day ahead of time and place in the refrigerator prior to drinking.  You have to drink the solution within 24-36 hours of mixing it.  Do NOT put this solution over ice.  It IS ok to drink with a straw.    3. AT 5 PM THE DAY BEFORE YOUR COLONOSCOPY, DRINK ONE (1) 8 OUNCE GLASS OF MIXTURE EVERY 10 MINUTES UNTIL HALF OF THE GALLON IS CONSUMED.  Keep this mixture cold and in refrigerator as much as you can while drinking it.  Place the remaining  half of mixture in the refrigerator when you finish the first half.    4.  The endoscopy department will call you 2 days before your colonoscopy to tell you the exact time to arrive, AND to tell you the exact time to drink the 2nd portion of your prep (which will be FIVE HOURS BEFORE YOUR ARRIVAL TIME).  At this time given to you, DRINK ONE (1) 8 OUNCE GLASS OF MIXTURE EVERY 10 MINUTES UNTIL THE OTHER HALF IS CONSUMED. Keep the mixture cold while you are drinking it. Once this is complete, you may not have ANYTHING else by mouth!      5.  You must have someone with you to DRIVE YOU HOME since you will be receiving IV sedation for the colonoscopy.    6.  It is ok to take your heart, blood pressure, and seizure medications in the morning of your test with a SIP of water.  Hold other medications until after your procedure.  Do NOT have anything else to eat or drink the morning of your colonoscopy.  It is ok to brush your teeth.    7.  If you are on blood thinners THAT YOU HAVE BEEN INSTRUCTED TO HOLD BY YOUR DOCTOR FOR THIS PROCEDURE, then do NOT take this the morning of your colonoscopy.  Do NOT stop these medications on your own, they must be approved to be held by your doctor.  Your colonoscopy can NOT be done if you are on these medications.  Examples of blood thinners include: Coumadin, Aggrenox, Plavix, Pradaxa, Reapro, Pletal, Xarelto, Ticagrelor, Brilinta, Eliquis, and high dose aspirin (325 mg).  You do not have to stop baby aspirin 81 mg.    8.  IF YOU ARE DIABETIC:  NO INSULIN OR ORAL MEDICATIONS THE MORNING OF THE COLONOSCOPY.  TAKE ONLY HALF THE DOSE OF YOUR INSULIN THE DAY BEFORE THE COLONOSCOPY.  DO NOT TAKE ANY ORAL DIABETIC MEDICATIONS THE DAY BEFORE THE COLONOSCOPY.  IF YOU ARE AN INSULIN DEPENDENT DIABETIC WITH UNSTABLE BLOOD SUGARDS, NOTIFY YOUR PRIMARY CARE PHYSICIAN FOR INSTRUCTIONS.

## 2019-03-29 NOTE — LETTER
March 29, 2019      Aster Paz, DO  [address]  [place] [address]           [facility]  [facility] , [address]   [facility] [address]  Phone: [phone]  Fax: [phone]          Patient: Hafsa Hawley   MR Number: [id number]   YOB: 1965   Date of Visit: 3/29/2019       Dear Dr. Aster Paz:    Thank you for referring Hafsa Hawley to me for evaluation. Attached you will find relevant portions of my assessment and plan of care.    If you have questions, please do not hesitate to call me. I look forward to following Hafsa Hawley along with you.    Sincerely,    Cheryl Boykin MD    Enclosure  CC:  No Recipients    If you would like to receive this communication electronically, please contact [email or web address] or [phone] to request more information on [employer] Link access.    For providers and/or their staff who would like to refer a patient to [facility], please contact us through our one-stop-shop provider referral line, [facility], at [phone].    If you feel you have received this communication in error or would no longer like to receive these types of communications, please e-mail [email or web address]

## 2019-04-01 ENCOUNTER — TELEPHONE (OUTPATIENT)
Dept: NEUROLOGY | Facility: CLINIC | Age: 54
End: 2019-04-01

## 2019-04-01 ENCOUNTER — OFFICE VISIT (OUTPATIENT)
Dept: NEUROLOGY | Facility: CLINIC | Age: 54
End: 2019-04-01
Payer: MEDICAID

## 2019-04-01 VITALS
SYSTOLIC BLOOD PRESSURE: 140 MMHG | HEART RATE: 62 BPM | DIASTOLIC BLOOD PRESSURE: 78 MMHG | BODY MASS INDEX: 33.06 KG/M2 | HEIGHT: 66 IN | WEIGHT: 205.69 LBS

## 2019-04-01 DIAGNOSIS — M54.2 CHRONIC NECK PAIN: ICD-10-CM

## 2019-04-01 DIAGNOSIS — M54.5 CHRONIC LOW BACK PAIN, UNSPECIFIED BACK PAIN LATERALITY, WITH SCIATICA PRESENCE UNSPECIFIED: Primary | ICD-10-CM

## 2019-04-01 DIAGNOSIS — G89.29 CHRONIC LOW BACK PAIN, UNSPECIFIED BACK PAIN LATERALITY, WITH SCIATICA PRESENCE UNSPECIFIED: Primary | ICD-10-CM

## 2019-04-01 DIAGNOSIS — R20.2 ARM PARESTHESIA, RIGHT: ICD-10-CM

## 2019-04-01 DIAGNOSIS — M79.7 FIBROMYALGIA: ICD-10-CM

## 2019-04-01 DIAGNOSIS — G89.29 CHRONIC NECK PAIN: ICD-10-CM

## 2019-04-01 PROCEDURE — 99999 PR PBB SHADOW E&M-EST. PATIENT-LVL V: ICD-10-PCS | Mod: PBBFAC,TXP,, | Performed by: PSYCHIATRY & NEUROLOGY

## 2019-04-01 PROCEDURE — 99999 PR PBB SHADOW E&M-EST. PATIENT-LVL V: CPT | Mod: PBBFAC,TXP,, | Performed by: PSYCHIATRY & NEUROLOGY

## 2019-04-01 PROCEDURE — 99204 PR OFFICE/OUTPT VISIT, NEW, LEVL IV, 45-59 MIN: ICD-10-PCS | Mod: S$PBB,NTX,, | Performed by: PSYCHIATRY & NEUROLOGY

## 2019-04-01 PROCEDURE — 99204 OFFICE O/P NEW MOD 45 MIN: CPT | Mod: S$PBB,NTX,, | Performed by: PSYCHIATRY & NEUROLOGY

## 2019-04-01 PROCEDURE — 99215 OFFICE O/P EST HI 40 MIN: CPT | Mod: PBBFAC,PO,TXP | Performed by: PSYCHIATRY & NEUROLOGY

## 2019-04-01 NOTE — TELEPHONE ENCOUNTER
You saw the patient today . She wants to know if you recommend a brace, wrap and or cream because of the pain and the wait for the EMG visit. The patient wants a call back. If so is there a prescription for the brace?

## 2019-04-01 NOTE — LETTER
April 2, 2019      Benson Cortez MD  [address] [place] [address]           [address] [address]  Phone: [phone]  Fax: [phone]          Patient: Hafsa Hawley   MR Number: [id number]   YOB: 1965   Date of Visit: 4/1/2019       Dear Dr. Benson Cortez:    Thank you for referring Hafsa Hawley to me for evaluation. Attached you will find relevant portions of my assessment and plan of care.    If you have questions, please do not hesitate to call me. I look forward to following Hafsa Hawley along with you.    Sincerely,    Fabio Dennison MD    Enclosure  CC:  No Recipients    If you would like to receive this communication electronically, please contact [email or web address] or [phone] to request more information on [employer] access.    For providers and/or their staff who would like to refer a patient to [facility], please contact us through our one-stop-shop provider referral line, [facility], at [phone].    If you feel you have received this communication in error or would no longer like to receive these types of communications, please e-mail [email or web address]

## 2019-04-02 ENCOUNTER — TELEPHONE (OUTPATIENT)
Dept: FAMILY MEDICINE | Facility: HOSPITAL | Age: 54
End: 2019-04-02

## 2019-04-02 DIAGNOSIS — G89.4 CHRONIC PAIN SYNDROME: Primary | ICD-10-CM

## 2019-04-02 NOTE — PROGRESS NOTES
Neurology Clinic Visit  Primary Care Provider: Aster Paz  Referring Provider:  Benson Cortez  Date of Visit:   04/01/2019  Reason for visit:  Pain   chief complaint:   Chief Complaint   Patient presents with    Facial Pain     right side, down the shoulder, hand and fingers- back pain        History of present illness  Hafsa Hawley is a 53 y.o. right handed female with chronic pain I have been asked to consult for evaluation of her pain. Patient reports she has a history of fibromyalgia.  She reports on chronic lower back pain for more than 10 years.  She also reports there is radiating features to her lower back pain that goes down the back of her legs but she is not sure if it goes into her feet.  She has chronic knee pain left more than right.  She also reports pain in her neck and in her shoulders.  She also reports pain in her right arm with tingling sensation in her right hand in digits 1 through 3.  She reports taking gabapentin in the past but it did not help her symptoms.  She was on Lyrica previously but her insurance will not cover it.  She is currently on amitriptyline which she takes intermittently and she feels that it does help some with her pain.      Patient Active Problem List    Diagnosis Date Noted    Screen for colon cancer 03/29/2019    Rash 03/29/2019    Arrhythmia 01/28/2019    elevated bilirubin d/t Gilbert's syndrome     Paroxysmal supraventricular tachycardia 08/20/2018    ICD (implantable cardioverter-defibrillator) in place 07/24/2018    Chronic kidney disease, stage III (moderate) 05/08/2018    Anxiety and depression 05/08/2018    Left ventricular hypertrophy     Chronic combined systolic and diastolic heart failure 03/16/2018    Impaired functional mobility and activity tolerance 01/23/2018    COPD (chronic obstructive pulmonary disease) 12/19/2017    Lumbar degenerative disc disease 06/07/2017    NICM (nonischemic cardiomyopathy) 10/27/2016    Paroxysmal  atrial fibrillation 2016    MELISSA (obstructive sleep apnea) 2016    Essential hypertension 2016    Microcytic anemia 2016    Fibromyalgia affecting multiple sites 06/15/2016     Past Medical History:   Diagnosis Date    Anemia     Anticoagulant long-term use     Arthritis     Atrial fibrillation     Congestive heart failure     COPD (chronic obstructive pulmonary disease)     Deep vein thrombosis     elevated bilirubin d/t Gilbert's syndrome     confirmed by [facility] genetic testing, evaluated by hepatology    Encounter for blood transfusion     GERD (gastroesophageal reflux disease)     Hypertension     Pheochromocytoma, malignant     Right kidney mass     Sleep apnea     Thyroid disease      Past Surgical History:   Procedure Laterality Date    APPENDECTOMY      BIOPSY, LIVER, TRANSJUGULAR APPROACH N/A 10/24/2018    Performed by [facility] at [facility] OR 2ND FLR    CARDIAC DEFIBRILLATOR PLACEMENT Left 2016    EYE SURGERY      due to running tears    FRACTURE SURGERY Left     hand 5th digit    HEART CATH-RIGHT Right 2016    Performed by Rakesh Garcia MD at [facility] CATH LAB    HYSTERECTOMY      INSERTION-ICD-SINGLE N/A 2016    Performed by Bob Pretty MD at [facility] CATH LAB    KNEE SURGERY Left 2016    hematoma    LIVER BIOPSY  10/24/2018    Minimal steatosis, predominantly macrovesicular, 1%, Minimal nonspecific portal inflammation, no fibrosis. No findings on biopsy to explain elevated bilirubin levels. Could be d/t Gilbert's =?- hemolysis    PROCEDURE PROLAPSE HEMORRHOID (PPH) N/A 3/7/2017    Performed by GEORGE Rothman MD at [facility] OR    Right heart cath Right 2018    Performed by Benson Cortez MD at [facility] CATH LAB     Family History   Problem Relation Age of Onset    Cancer Mother         pancreatic CA early 50's    Heart disease Father          MI in late 50's    Hypertension Father     Heart disease Sister     Heart  disease Brother     Cirrhosis Brother         alcoholic    Heart disease Sister     Heart disease Brother     Hypertension Brother     Diabetes Brother          Current Outpatient Medications   Medication Sig    acetaminophen (TYLENOL) 325 MG tablet Take 2 tablets (650 mg total) by mouth every 6 (six) hours as needed for Pain.    albuterol-ipratropium 2.5mg-0.5mg/3mL (DUO-NEB) 0.5 mg-3 mg(2.5 mg base)/3 mL nebulizer solution Take 1 mL by nebulization every 6 (six) hours as needed for Wheezing or Shortness of Breath.    ALPRAZolam (XANAX) 1 MG tablet Take 1 mg by mouth nightly as needed for Anxiety.    b complex vitamins tablet Take 1 tablet by mouth once daily.    carvedilol (COREG) 25 MG tablet Take 1 tablet by mouth 2 (two) times daily.    cyanocobalamin, vitamin B-12, 50 mcg tablet Take 5 mcg by mouth once daily.    ELIQUIS 5 mg Tab TAKE 1 TABLET BY MOUTH TWICE DAILY    ENTRESTO 49-51 mg per tablet TAKE 1 TABLET BY MOUTH 2 TIMES A DAY    fluticasone-vilanterol (BREO ELLIPTA) 100-25 mcg/dose diskus inhaler Inhale 1 puff into the lungs once daily. Controller    isosorbide dinitrate (ISORDIL) 20 MG tablet Take 1 tablet (20 mg total) by mouth 2 (two) times daily. Please check that this is what you are already taking at home.    NITROSTAT 0.4 mg SL tablet Take 2.5 tablets (1 mg total) by mouth every 5 (five) minutes as needed for Chest pain. No more than 3 tablets in 15 minutes.    pantoprazole (PROTONIX) 40 MG tablet Take 1 tablet by mouth once daily.    potassium chloride (KLOR-CON) 10 MEQ TbSR Take 1 tablet (10 mEq total) by mouth once daily.    rOPINIRole (REQUIP) 0.5 MG tablet Take 0.5 mg by mouth once daily.    VENTOLIN HFA 90 mcg/actuation inhaler Inhale 2 puffs into the lungs 2 (two) times daily as needed.     walker Misc 1 Device by Misc.(Non-Drug; Combo Route) route as needed.    amitriptyline (ELAVIL) 25 MG tablet Take 1 tablet (25 mg total) by mouth nightly as needed for Insomnia.     "atorvastatin (LIPITOR) 40 MG tablet Take 1 tablet (40 mg total) by mouth once daily.    furosemide (LASIX) 80 MG tablet Take 1 tablet (80 mg total) by mouth 2 (two) times daily. TAKE EXTRA DOSE FOR GAIN OF 2 OR MORE POUND WEIGHT IN 1 DAY OR 5 POUNDS IN 1 WEEK.    polyethylene glycol (GOLYTELY,NULYTELY) 236-22.74-6.74 -5.86 gram suspension Use as directed    spironolactone (ALDACTONE) 25 MG tablet Take 1 tablet (25 mg total) by mouth once daily.     No current facility-administered medications for this visit.      Review of patient's allergies indicates:   Allergen Reactions    Penicillins Hives    Percocet [oxycodone-acetaminophen] Other (See Comments)     Nausea, Dizziness, Anxiety.  "I don't like how it makes me feel."   Given Hydromorphone 0.5mg IVP  Without problems.    Diovan hct [valsartan-hydrochlorothiazide] Other (See Comments)     Causes coughing    Irinotecan      Pt has homozygosity for the TA7 promoter variant that places this individual at significantly increased risk for   severe neutropenia (grade 4) when treated with the standard dose of irinotecan (risk approximately 50%).   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 Metabolism of other drugs not listed here may also be impacted by UGT1A1 enzyme activity.       Tramadol Nausea And Vomiting     Social History     Socioeconomic History    Marital status:      Spouse name: Not on file    Number of children: Not on file    Years of education: Not on file    Highest education level: Not on file   Occupational History    Not on file   Social Needs    Financial resource strain: Not on file    Food insecurity:     Worry: Not on file     Inability: Not on file    Transportation needs:     Medical: Not on file     Non-medical: Not on file   Tobacco Use    Smoking status: Never Smoker    Smokeless tobacco: Never " "Used   Substance and Sexual Activity    Alcohol use: Yes     Frequency: Monthly or less     Drinks per session: 1 or 2     Comment: up to 1 yr ago drank 2-3 drinks on occasion but sporadic    Drug use: No    Sexual activity: Yes     Partners: Male   Lifestyle    Physical activity:     Days per week: Not on file     Minutes per session: Not on file    Stress: Not on file   Relationships    Social connections:     Talks on phone: Not on file     Gets together: Not on file     Attends Sabianist service: Not on file     Active member of club or organization: Not on file     Attends meetings of clubs or organizations: Not on file     Relationship status: Not on file    Intimate partner violence:     Fear of current or ex partner: Not on file     Emotionally abused: Not on file     Physically abused: Not on file     Forced sexual activity: Not on file   Other Topics Concern    Not on file   Social History Narrative    On disability since 2013       Review of Systems    Constitutional: negative  Eyes: negative  Ears, nose, mouth, throat, and face: negative  Respiratory: negative  Cardiovascular: negative  Gastrointestinal: negative  Genitourinary:negative  Integument/breast: negative  Hematologic/lymphatic: negative  Musculoskeletal:negative  Neurological: negative  Behavioral/Psych: negative  Endocrine: negative  Allergic/Immunologic: negative    Objective:  Vital signs in last 24 hours:    Vitals:    04/01/19 1033   BP: (!) 140/78   Pulse: 62   Weight: 93.3 kg (205 lb 11 oz)   Height: 5' 6" (1.676 m)     General: no acute distress, well nourished, well-groomed  CVS: RRR, no murmur, gallops or rubs  Respiratory: Clear to ausculation  Extremities: no edema    Neurological Examination:    HIGHER INTEGRATIVE FUNCTIONS:  -Normal attention span and concentration; immediately responds to questions and commands  -Oriented to time, place and person  -Recent and remote memory intact  -Language normal (no aphasia or " dysarthria)  -Normal fund of knowledge    CN:  -PERRLA, visual fields full, unable to visualize optic discs due to small pupils on fundus exam  -EOMI with normal saccades and smooth pursuit  -Facial sensation intact bilaterally  -Facial strength/movement intact bilaterally  -Hearing intact to voice  -Palate elevates symmetrically  -Normal shoulder shrug and head turn  -Tongue protrudes midline    MOTOR: (left/right graded 1-5)  -UE: 5/5 deltoids; 5/5 biceps, triceps; 5/5 wrist flexors, extensors; 5/5 interosseous; 5/5   -LEs: 5/5 hip flexion, extension; 5/5 knee flexion, extension; 5/5 ankle flexion, extension  -Tone: normal  -No pronator drift, no orbiting    SENSORY:  -Light touch, temperature sensation intact bilaterally    REFLEXES:  -2+ upper and lower bilaterally  -Flexor plantar reflex bilaterally  -No clonus    COORDINATION:  -FNF, HKS intact bilaterally    GAIT:  -Normal casual gait       Assessment/Plan:  1. Chronic neck pain  2. Chronic lower back pain with radiating features: rule out lumbosacral radiculopathy  3. Right arm/hand paresthesias: rule out carpal tunnel vs cervical radiculopathy.  4. Fibromyalgia    Plan:  Will obtain EMG/NCS of bilateral lower extremities and right upper extremity  Will [facility] to [facility]  Will refer patient to Rheumatology for evaluation of her fibromyalgia.    I discussed assessment and plan with the patient and answered the questions that she had.    Patient note was created using Dragon Dictation.  Any errors in syntax or even information may not have been identified and edited on initial review prior to signing this note.

## 2019-04-02 NOTE — TELEPHONE ENCOUNTER
----- Message from Emelia Guzmán MA sent at 4/1/2019 11:38 AM CDT -----  Patient states the powder you gave for the rash is not working and she wants something else.  Please call patient at number above to discuss further.  Thanks.

## 2019-04-03 ENCOUNTER — TELEPHONE (OUTPATIENT)
Dept: ELECTROPHYSIOLOGY | Facility: CLINIC | Age: 54
End: 2019-04-03

## 2019-04-03 ENCOUNTER — OFFICE VISIT (OUTPATIENT)
Dept: ELECTROPHYSIOLOGY | Facility: CLINIC | Age: 54
End: 2019-04-03
Payer: MEDICAID

## 2019-04-03 ENCOUNTER — HOSPITAL ENCOUNTER (OUTPATIENT)
Dept: CARDIOLOGY | Facility: CLINIC | Age: 54
Discharge: HOME | End: 2019-04-03
Payer: MEDICAID

## 2019-04-03 VITALS
HEART RATE: 65 BPM | HEIGHT: 66 IN | DIASTOLIC BLOOD PRESSURE: 88 MMHG | BODY MASS INDEX: 32.99 KG/M2 | WEIGHT: 205.25 LBS | SYSTOLIC BLOOD PRESSURE: 140 MMHG

## 2019-04-03 DIAGNOSIS — I48.0 PAROXYSMAL ATRIAL FIBRILLATION: Chronic | ICD-10-CM

## 2019-04-03 DIAGNOSIS — I47.10 SVT (SUPRAVENTRICULAR TACHYCARDIA): Primary | ICD-10-CM

## 2019-04-03 DIAGNOSIS — I10 ESSENTIAL HYPERTENSION: Chronic | ICD-10-CM

## 2019-04-03 DIAGNOSIS — I47.10 PAROXYSMAL SUPRAVENTRICULAR TACHYCARDIA: Chronic | ICD-10-CM

## 2019-04-03 DIAGNOSIS — Z95.810 ICD (IMPLANTABLE CARDIOVERTER-DEFIBRILLATOR) IN PLACE: Primary | Chronic | ICD-10-CM

## 2019-04-03 DIAGNOSIS — I42.8 NICM (NONISCHEMIC CARDIOMYOPATHY): Chronic | ICD-10-CM

## 2019-04-03 DIAGNOSIS — G47.33 OSA (OBSTRUCTIVE SLEEP APNEA): Chronic | ICD-10-CM

## 2019-04-03 DIAGNOSIS — I48.0 PAF (PAROXYSMAL ATRIAL FIBRILLATION): ICD-10-CM

## 2019-04-03 PROCEDURE — 99215 OFFICE O/P EST HI 40 MIN: CPT | Mod: S$PBB,NTX,, | Performed by: NURSE PRACTITIONER

## 2019-04-03 PROCEDURE — 93010 RHYTHM STRIP: ICD-10-PCS | Mod: S$PBB,NTX,, | Performed by: INTERNAL MEDICINE

## 2019-04-03 PROCEDURE — 99215 PR OFFICE/OUTPT VISIT, EST, LEVL V, 40-54 MIN: ICD-10-PCS | Mod: S$PBB,NTX,, | Performed by: NURSE PRACTITIONER

## 2019-04-03 PROCEDURE — 93010 ELECTROCARDIOGRAM REPORT: CPT | Mod: S$PBB,NTX,, | Performed by: INTERNAL MEDICINE

## 2019-04-03 PROCEDURE — 99999 PR PBB SHADOW E&M-EST. PATIENT-LVL III: ICD-10-PCS | Mod: PBBFAC,TXP,, | Performed by: NURSE PRACTITIONER

## 2019-04-03 PROCEDURE — 99999 PR PBB SHADOW E&M-EST. PATIENT-LVL III: CPT | Mod: PBBFAC,TXP,, | Performed by: NURSE PRACTITIONER

## 2019-04-03 PROCEDURE — 93005 ELECTROCARDIOGRAM TRACING: CPT | Mod: PBBFAC,NTX | Performed by: INTERNAL MEDICINE

## 2019-04-03 PROCEDURE — 99213 OFFICE O/P EST LOW 20 MIN: CPT | Mod: PBBFAC,NTX | Performed by: NURSE PRACTITIONER

## 2019-04-03 NOTE — PROGRESS NOTES
Ms. Hawley is a patient of Dr. Pretty and was last seen in clinic 7/25/2018.      Subjective:   Patient ID:  Hafsa Hawley is a 53 y.o. female who presents for follow-up of No chief complaint on file.  .     HPI:    Ms. Hawley is a 53 y.o. female with NICM, CHF, HTN, Sleep Apnea, paroxysmal atrial fibrillation (on eliquis), Fibromyalgia, ICD here for follow up.     Background:    Primary cardiologist is Dr. Cortez.  Longstanding history of non-ischemic cardiomyopathy.  [facility] 11/15/15 EF 30-35% with normal coronary arteries.  Has been on optimal medical therapy.  Admitted to [facility] 9/16 with acute decompensated Heart failure. Diuresed 12 liters.  Echo 8/24/16 EF 20%.  Repeat echo 10/17/16 EF 30-35% with small pericardial effusion.  ICD implanted 12/2/16 (VDD single pass lead).    Update (04/03/2019):    Patient has had several hospitalizations since last clinic visit:    8/20/2018: Went to ED with chest tightness and palpitations. HR in 150s and found to be in SVT. Given diltiazem and spontaneously converted back to sinus rhythm.   8/30/2018: CHF hospitalization. No arrhythmia during this admission.  10/21/2018: ED with CP. No arrhythmia during this admission. Device check 10/29/2019 showed SVTx2, max 52 seconds during this period.  11/17/2018: ED with CP. SVT on EKG. Spontaneous conversion.  1/27/2019: ED with chest pain and SOB. She was given Adnoesine 6mg x 1 with conversion to SR. Device check 1/30/2019 showed SVTx19, longest 10 minutes.    Today she says that she has been very stressed, unsure if she is going to have episodes of palpitations at any minute. Episodes occur usually when at rest, and are associated with CP and SOB. She is also being managed by [facility]. Denies syncope.    She is currently taking eliquis 5mg BID for stroke prophylaxis and denies significant bleeding episodes. She is currently being treated with coreg 25mg BID for HR control. Kidney function is stable, with a creatinine of 1.3 on  2/14/2019.    Device Interrogation (4/2/2019) reveals an intrinsic AF with stable lead and device function. No arrhythmias or treated episodes were noted. She paces 0% in the RV. Battery at [facility].     I have personally reviewed the patient's EKG today, which shows sinus rhytjhm at 65bpm. DC interval is 180. QTc is 443.    Recent Cardiac Tests:    2D Echo (10/22/2018):  CONCLUSIONS     1 - Severely depressed left ventricular systolic function (EF 20-25%).     2 - Impaired LV relaxation, increased LVEDP.     3 - Normal right ventricular systolic function .     4 - The estimated PA systolic pressure is 22 mmHg.     5 - Mild to moderate mitral regurgitation.     Current Outpatient Medications   Medication Sig    acetaminophen (TYLENOL) 325 MG tablet Take 2 tablets (650 mg total) by mouth every 6 (six) hours as needed for Pain.    albuterol-ipratropium 2.5mg-0.5mg/3mL (DUO-NEB) 0.5 mg-3 mg(2.5 mg base)/3 mL nebulizer solution Take 1 mL by nebulization every 6 (six) hours as needed for Wheezing or Shortness of Breath.    ALPRAZolam (XANAX) 1 MG tablet Take 1 mg by mouth nightly as needed for Anxiety.    atorvastatin (LIPITOR) 40 MG tablet Take 1 tablet (40 mg total) by mouth once daily.    b complex vitamins tablet Take 1 tablet by mouth once daily.    carvedilol (COREG) 25 MG tablet Take 1 tablet by mouth 2 (two) times daily.    cyanocobalamin, vitamin B-12, 50 mcg tablet Take 5 mcg by mouth once daily.    ELIQUIS 5 mg Tab TAKE 1 TABLET BY MOUTH TWICE DAILY    ENTRESTO 49-51 mg per tablet TAKE 1 TABLET BY MOUTH 2 TIMES A DAY    fluticasone-vilanterol (BREO ELLIPTA) 100-25 mcg/dose diskus inhaler Inhale 1 puff into the lungs once daily. Controller    furosemide (LASIX) 80 MG tablet Take 1 tablet (80 mg total) by mouth 2 (two) times daily. TAKE EXTRA DOSE FOR GAIN OF 2 OR MORE POUND WEIGHT IN 1 DAY OR 5 POUNDS IN 1 WEEK.    isosorbide dinitrate (ISORDIL) 20 MG tablet Take 1 tablet (20 mg total) by mouth 2 (two)  "times daily. Please check that this is what you are already taking at home.    NITROSTAT 0.4 mg SL tablet Take 2.5 tablets (1 mg total) by mouth every 5 (five) minutes as needed for Chest pain. No more than 3 tablets in 15 minutes.    pantoprazole (PROTONIX) 40 MG tablet Take 1 tablet by mouth once daily.    polyethylene glycol (GOLYTELY,NULYTELY) 236-22.74-6.74 -5.86 gram suspension Use as directed    potassium chloride (KLOR-CON) 10 MEQ TbSR Take 1 tablet (10 mEq total) by mouth once daily.    rOPINIRole (REQUIP) 0.5 MG tablet Take 0.5 mg by mouth once daily.    spironolactone (ALDACTONE) 25 MG tablet Take 1 tablet (25 mg total) by mouth once daily.    VENTOLIN HFA 90 mcg/actuation inhaler Inhale 2 puffs into the lungs 2 (two) times daily as needed.     amitriptyline (ELAVIL) 25 MG tablet Take 1 tablet (25 mg total) by mouth nightly as needed for Insomnia.    walker Misc 1 Device by Misc.(Non-Drug; Combo Route) route as needed.     No current facility-administered medications for this visit.      Review of Systems   Constitution: Negative for malaise/fatigue.   Cardiovascular: Positive for chest pain and palpitations. Negative for dyspnea on exertion, irregular heartbeat and leg swelling.   Respiratory: Positive for shortness of breath.    Hematologic/Lymphatic: Negative for bleeding problem.   Skin: Negative for rash.   Musculoskeletal: Negative for myalgias.   Gastrointestinal: Negative for hematemesis, hematochezia and nausea.   Genitourinary: Negative for hematuria.   Neurological: Negative for light-headedness.   Psychiatric/Behavioral: Negative for altered mental status.   Allergic/Immunologic: Negative for persistent infections.     Objective:          BP (!) 140/88   Pulse 65   Ht 5' 6" (1.676 m)   Wt 93.1 kg (205 lb 4 oz)   LMP  (LMP Unknown)   BMI 33.13 kg/m²     Physical Exam   Constitutional: She is oriented to person, place, and time. She appears well-developed and well-nourished. "   HENT:   Head: Normocephalic.   Nose: Nose normal.   Eyes: Pupils are equal, round, and reactive to light.   Cardiovascular: Normal rate, regular rhythm, S1 normal and S2 normal.   No murmur heard.  Pulses:       Radial pulses are 2+ on the right side, and 2+ on the left side.   Pulmonary/Chest: Breath sounds normal. No respiratory distress.   Device to LUCW.   Abdominal: Normal appearance.   Musculoskeletal: Normal range of motion. She exhibits no edema.   Neurological: She is alert and oriented to person, place, and time.   Skin: Skin is warm and dry. No erythema.   Psychiatric: She has a normal mood and affect. Her speech is normal and behavior is normal.   Nursing note and vitals reviewed.      Lab Results   Component Value Date     02/14/2019    K 4.4 02/14/2019    MG 1.6 01/28/2019    BUN 32 (H) 02/14/2019    CREATININE 1.3 02/14/2019    ALT 11 02/14/2019    AST 15 02/14/2019    HGB 9.3 (L) 02/14/2019    HCT 30.8 (L) 02/14/2019    TSH 0.760 10/21/2018    LDLCALC 62.0 (L) 10/21/2018       Recent Labs   Lab 08/20/18  0859 09/27/18  1010 10/21/18  1137 01/27/19  2135   INR 1.1 1.2 1.0 1.2           Assessment:     1. ICD (implantable cardioverter-defibrillator) in place    2. MELISSA (obstructive sleep apnea)    3. Paroxysmal atrial fibrillation    4. Paroxysmal supraventricular tachycardia    5. Essential hypertension    6. NICM (nonischemic cardiomyopathy)      Plan:     In summary, Ms. Hawley is a 53 y.o. female with NICM, CHF, HTN, Sleep Apnea, atrial fibrillation, Fibromyalgia, ICD here for follow up.   Stable lead and device function. No ventricular pacing. On eliquis for CVA prophylaxis. Ms. Hawley is having more frequent episodes of SVT, which have resulted in frequent hospitalizations.  Case reviewed with Dr. Pretty. SVT c/w AVNRT. I had extensive discussion with Hafsa Hawley regarding the risks, benefits, indications, and alternatives to invasive electrophysiology study and catheter ablation. She  understood and elected to proceed.    Schedule SVT ablation.   Hold carvedilol 3 days prior to procedure.  Hold eliquis AM of procedure.  Continue current medication regimen and device settings.   Follow up in device clinic as scheduled following procedure.     *A copy of this note has been sent to Dr. Pretty, who also counseled the patient*    Follow up as scheduled following procedure.    ------------------------------------------------------------------    LYNN Fields, NP-C  Arrhythmia Clinic

## 2019-04-03 NOTE — TELEPHONE ENCOUNTER
----- Message from Lourdes Dominguez NP sent at 4/3/2019  3:22 PM CDT -----  I realized Ms. Hawley is on coreg, so she should hold that 3 days prior to her SVT RFA.  Thanks!

## 2019-04-04 NOTE — PROGRESS NOTES
ABLATION EDUCATION CHECKLIST    PRE-PROCEDURE TESTIN2019 @ 10 AM  Pre-Procedure labs have been ordered for you at:  [facility]   · YOU DO NOT HAVE TO FAST FOR THIS LAB WORK!      2019 - Last dose of carvedilol (Coreg) prior to procedure        DAY OF PROCEDURE:    2019 @ 9 AM - For Ablation  Report to Cardiology Waiting Room on 3rd floor of the Hospital    · Do not eat or drink anything after: 12 mn on the night before your procedure  · Please do not wear makeup (especially mascara) when arriving for your procedure    Medications:   · HOLD Carvedilol (Coreg) for 3 days prior to procedure. Last dose will be 2019  · HOLD Eliquis on morning of ablation ONLY (2019)  · You may take your other usual morning medications with a sip of water      Directions to the Cardiology Waiting Room  If you park in the Parking Garage:  Take elevators to the 2nd floor  Walk up ramp and turn right by Gold Elevators  Take elevator to the 3rd floor  Upon exiting the elevator, turn away from the clinic areas  Walk long pérez around to front of hospital to area with windows overlooking [facility]  Check in at Reception Desk  OR  If family is dropping you off:  Have them drop you off at the front of the Hospital  (Near the ER, where all the flags are hung).  Take the E elevators to the 3rd floor.  Check in at the Reception Desk in the waiting room.    · You will be spending the night after your procedure.  · You will need someone to drive you home the day after your procedure.  · Your pain during your procedure will be managed by the anesthesia team.     Any need to reschedule or cancel procedures, or any questions regarding your procedures should be addressed directly with the Arrhythmia Department Nurses at the following phone number: [phone]

## 2019-04-15 ENCOUNTER — TELEPHONE (OUTPATIENT)
Dept: ELECTROPHYSIOLOGY | Facility: CLINIC | Age: 54
End: 2019-04-15

## 2019-04-15 NOTE — TELEPHONE ENCOUNTER
Called patient to review pre-op instructions for SVT ablation tomorrow. States she did not hold Coreg, as directed. Her last dose was yesterday evening. Discussed with Dr Pretty, he wants to reschedule the ablation so that she can hold her Coreg for 3 days prior to procedure. Spoke with patient and rescheduled the procedure for 5/24/2019. Will mail her the instructions, as she said that she does not check her email.

## 2019-04-15 NOTE — TELEPHONE ENCOUNTER
----- Message from RT Greg sent at 4/15/2019  4:57 PM CDT -----  Contact: Self      ----- Message -----  From: Patience Farnsworth  Sent: 4/15/2019   1:49 PM  To: Sukhwinder Rojas Staff    .Needs Advice    Reason for call: Jerel would like to speak with you regarding the procedure. Please call Thanks        Communication Preference:  [phone]    Additional Information:

## 2019-04-15 NOTE — TELEPHONE ENCOUNTER
Spoke with patient. She thought we rescheduled for 4/24/19, not 5/24/19. She is very upset and swears that she did not get the email with the instructions. I told her that I see where the email went through on 4/3/19 at 9:51pm. She then stated that she didn't go back that far and thought I would have sent it closer to the procedure. I apologized for the miscommunication. I did offer 5/23 as the new date since I had a cancellation. She confirmed 5/23 for SVT ablation. Will mail instructions.

## 2019-04-16 ENCOUNTER — TELEPHONE (OUTPATIENT)
Dept: ELECTROPHYSIOLOGY | Facility: CLINIC | Age: 54
End: 2019-04-16

## 2019-04-16 DIAGNOSIS — I47.10 SVT (SUPRAVENTRICULAR TACHYCARDIA): Primary | ICD-10-CM

## 2019-04-16 NOTE — TELEPHONE ENCOUNTER
ABLATION EDUCATION CHECKLIST     PRE-PROCEDURE TESTIN2019 @ 9 AM  Pre-Procedure labs have been ordered for you at:  [facility]   · YOU DO NOT HAVE TO FAST FOR THIS LAB WORK!        2019 - Last dose of carvedilol (Coreg) prior to procedure           DAY OF PROCEDURE:     2019 @ 9 AM - For Ablation  Report to Cardiology Waiting Room on 3rd floor of the Hospital    · Do not eat or drink anything after: 12 mn on the night before your procedure  · Please do not wear makeup (especially mascara) when arriving for your procedure     Medications:   · HOLD Carvedilol (Coreg) for 3 days prior to procedure. Last dose will be 2019  · HOLD Eliquis on morning of ablation ONLY (2019)  · You may take your other usual morning medications with a sip of water        Directions to the Cardiology Waiting Room  If you park in the Parking Garage:  Take elevators to the 2nd floor  Walk up ramp and turn right by Gold Elevators  Take elevator to the 3rd floor  Upon exiting the elevator, turn away from the clinic areas  Walk long pérez around to front of hospital to area with windows overlooking [facility]  Check in at Reception Desk  OR  If family is dropping you off:  Have them drop you off at the front of the Hospital  (Near the ER, where all the flags are hung).  Take the E elevators to the 3rd floor.  Check in at the Reception Desk in the waiting room.     · You will be spending the night after your procedure.  · You will need someone to drive you home the day after your procedure.  · Your pain during your procedure will be managed by the anesthesia team.      Any need to reschedule or cancel procedures, or any questions regarding your procedures should be addressed directly with the Arrhythmia Department Nurses at the following phone number: [phone]

## 2019-04-17 ENCOUNTER — TELEPHONE (OUTPATIENT)
Dept: GASTROENTEROLOGY | Facility: CLINIC | Age: 54
End: 2019-04-17

## 2019-04-17 NOTE — TELEPHONE ENCOUNTER
----- Message from Gypsy Jean Baptiste sent at 4/17/2019  1:49 PM CDT -----  Contact: Self [phone]  Patient would like to speak with you about her procedure. Please advise

## 2019-04-22 ENCOUNTER — TELEPHONE (OUTPATIENT)
Dept: GASTROENTEROLOGY | Facility: CLINIC | Age: 54
End: 2019-04-22

## 2019-04-22 RX ORDER — SACUBITRIL AND VALSARTAN 49; 51 MG/1; MG/1
TABLET, FILM COATED ORAL
Qty: 60 TABLET | Refills: 0 | Status: SHIPPED | OUTPATIENT
Start: 2019-04-22 | End: 2019-05-28 | Stop reason: SDUPTHER

## 2019-04-22 NOTE — TELEPHONE ENCOUNTER
Patient stated that she was given a verbal order from Dr.Christopher Cortez Cardiology office to stop her Eliquis. Patient is aware to restart medication and a second request will be sent to his office again. Patient is aware that when our office receive request back we will give her a call.  Do not stop on her own.

## 2019-04-24 ENCOUNTER — TELEPHONE (OUTPATIENT)
Dept: FAMILY MEDICINE | Facility: HOSPITAL | Age: 54
End: 2019-04-24

## 2019-04-24 ENCOUNTER — TELEPHONE (OUTPATIENT)
Dept: GASTROENTEROLOGY | Facility: CLINIC | Age: 54
End: 2019-04-24

## 2019-04-24 NOTE — TELEPHONE ENCOUNTER
----- Message from Micaela Salvador sent at 4/23/2019  2:59 PM CDT -----  Pt needs to talk to Dr in regards to her having muscle spasm. Please call pt back

## 2019-04-30 ENCOUNTER — OFFICE VISIT (OUTPATIENT)
Dept: CARDIOLOGY | Facility: CLINIC | Age: 54
End: 2019-04-30
Payer: MEDICAID

## 2019-04-30 ENCOUNTER — TELEPHONE (OUTPATIENT)
Dept: GASTROENTEROLOGY | Facility: CLINIC | Age: 54
End: 2019-04-30

## 2019-04-30 VITALS
OXYGEN SATURATION: 96 % | HEIGHT: 66 IN | WEIGHT: 210 LBS | SYSTOLIC BLOOD PRESSURE: 154 MMHG | DIASTOLIC BLOOD PRESSURE: 91 MMHG | HEART RATE: 64 BPM | BODY MASS INDEX: 33.75 KG/M2

## 2019-04-30 DIAGNOSIS — G47.33 OSA (OBSTRUCTIVE SLEEP APNEA): Chronic | ICD-10-CM

## 2019-04-30 DIAGNOSIS — I50.42 CHRONIC COMBINED SYSTOLIC AND DIASTOLIC HEART FAILURE: Primary | ICD-10-CM

## 2019-04-30 DIAGNOSIS — N18.30 CHRONIC KIDNEY DISEASE, STAGE III (MODERATE): Chronic | ICD-10-CM

## 2019-04-30 DIAGNOSIS — I10 ESSENTIAL HYPERTENSION: Chronic | ICD-10-CM

## 2019-04-30 DIAGNOSIS — I47.10 PAROXYSMAL SUPRAVENTRICULAR TACHYCARDIA: Chronic | ICD-10-CM

## 2019-04-30 DIAGNOSIS — I42.8 NICM (NONISCHEMIC CARDIOMYOPATHY): Chronic | ICD-10-CM

## 2019-04-30 DIAGNOSIS — Z95.810 ICD (IMPLANTABLE CARDIOVERTER-DEFIBRILLATOR) IN PLACE: Chronic | ICD-10-CM

## 2019-04-30 DIAGNOSIS — I48.0 PAROXYSMAL ATRIAL FIBRILLATION: Chronic | ICD-10-CM

## 2019-04-30 PROCEDURE — 99215 PR OFFICE/OUTPT VISIT, EST, LEVL V, 40-54 MIN: ICD-10-PCS | Mod: S$PBB,NTX,, | Performed by: INTERNAL MEDICINE

## 2019-04-30 PROCEDURE — 99215 OFFICE O/P EST HI 40 MIN: CPT | Mod: S$PBB,NTX,, | Performed by: INTERNAL MEDICINE

## 2019-04-30 PROCEDURE — 99999 PR PBB SHADOW E&M-EST. PATIENT-LVL IV: ICD-10-PCS | Mod: PBBFAC,TXP,, | Performed by: INTERNAL MEDICINE

## 2019-04-30 PROCEDURE — 99214 OFFICE O/P EST MOD 30 MIN: CPT | Mod: PBBFAC,PN,NTX | Performed by: INTERNAL MEDICINE

## 2019-04-30 PROCEDURE — 99999 PR PBB SHADOW E&M-EST. PATIENT-LVL IV: CPT | Mod: PBBFAC,TXP,, | Performed by: INTERNAL MEDICINE

## 2019-04-30 NOTE — PATIENT INSTRUCTIONS
Assessment/Plan:  Hafsa Hawley is a 53 year old female with PMH of NICM (EF 20-25%) s/p AICD placement (Biotronic), HTN, PAF (on Eliquis), MELISSA (not compliant with CPAP), who presents for scheduled clinic visit.     1. NICM (EF 20-25%) s/p AICD placement (Biotronic)- Pt with NYHA III symptoms.  Followed by Advanced Heart Failure Service (Dr. Garcia).  Not on translant list.  Continue Entresto 49/51 mg bid. Continue lasix, imdur, spironolactone at current dosages.  Continue 2 gram sodium restriction and 1500cc fluid restriction.  Continue daily weights. If gains 3 lbs in 1 day or 5 lbs in 1 week, pt instructed to call our office.      2. Paroxysmal- SVT ablation rescheduled for 5/23/2019 with Dr. Pretty.  Continue to monitor.     3. PAF- Continue eliquis.      4. Morbid Obesity- Continue to encourage diet, exercise and weight loss.      Follow up in 4 months

## 2019-04-30 NOTE — TELEPHONE ENCOUNTER
Attempt to contact patient in regards to rescheduling Colonoscopy. Approval was received from  office in regards to patient being able to hold Eliquis for 2 days prior to having procedure.

## 2019-04-30 NOTE — PROGRESS NOTES
"[facility]      Chief Complaint   Patient presents with    Shortness of Breath    Fatigue    Chest Pain     discomfort       Patient ID: Hafsa Hawley is a 53 year old female with PMH of NICM (EF 20-25%) s/p AICD placement (Biotronic), HTN, PAF (on Eliquis), MELISSA (not compliant with CPAP), who presents for scheduled clinic visit.  Pertinent history/events are as follows:     6/11/2018 Ms. Hawley presents to ED with complaint of SOB and chest tightness for the past week.  Chest tightness radiates to left shoulder.  No associated n/v/d.  States symptoms progressed over the weekend, despite using home nebulizer and taking nitro, prompting her to come to the ED.  States she ran out of spironolactone 1 week ago, and therefore has not been taking it.  Has been eating turkey sausage and pork sausage for breakfast.  Blood pressure 170/110.  EKG shows normal sinus rhythm; LCH with repolarization abnormalities; LAE (similar to previous tracings).  Troponin elevated at 0.380.  BNP elevated at 10,200.  CXR shows bilateral pulmonary edema with possible left retrocardiac airspace opacity.  Pt given lasix 80 mg IV in ED.       Ms. Hawley is tearful on exam, and worried about her multiple medical problems and "how things will turn out".  Also worried about needing an endocrine related surgery and her insurance not covering this.  She is currently hemodynamically stable and chest pain free. Echo from  3/17/2018 shows:  CONCLUSIONS     1 - Severely depressed left ventricular systolic function (EF 20-25%).     2 - Impaired LV relaxation, normal LAP (grade 1 diastolic dysfunction).     3 - Enlarged left ventricular enlargement.     4 - Concentric hypertrophy.     5 - Biatrial enlargement.     6 - Normal right ventricular systolic function .     7 - The estimated PA systolic pressure is 36 mmHg.     8 - Moderate mitral regurgitation.      Nuclear stress test from 12/21/2017 shows a fixed defect along the lateral wall of " "the left ventricle.  Significant dilated cardiomyopathy with 25% ejection fraction.    Assessment/Plan:    Presentation consistent with acute on chronic heart failure exacerbation.  Medication and low salt diet non-compliance are contributing factors.  Elevated troponin likely due to demand ischemia in this setting.  Pt with multiple recent admissions (5/2018; 3/2018; 12/2017) for heart failure exacerbations.     -Give 2 grams IV magnesium sulfate given runs of NSVT  -Continue current medication regimen.  Continue low salt diet.    -Refer to advanced heart failure team at [facility] as outpatient.  -Pt to follow up with Psychiatry as outpatient    Updated History:  -Pt now followed by Advanced Heart Failure Service at [facility] (Dr. Garcia).  Also followed by Dr. Pretty of  for ICD.    -She has had multiple hospital admissions for decompensated heart failure and SVT.      -On 4/3/2019, pt evaluated in EP clinic due to having more frequent episodes of SVT, which have resulted in frequent hospitalizations (details per Dr. Pretty's note below):  Case reviewed with Dr. Pretty. SVT c/w AVNRT. I had extensive discussion with Hafsa Hawley regarding the risks, benefits, indications, and alternatives to invasive electrophysiology study and catheter ablation. She understood and elected to proceed.     Schedule SVT ablation.   Hold carvedilol 3 days prior to procedure.  Hold eliquis AM of procedure.  Continue current medication regimen and device settings.   Follow up in device clinic as scheduled following procedure.    HPI:  Mrs. Hawley states "I've been doing up and down".  She has no chest pain, SOB, or significant LE edema.  Pt is well compensated on exam with clear lungs and no significantly elevated JVD.  SVT ablation rescheduled for 5/23/2019.      Past Medical History:   Diagnosis Date    Anemia     Anticoagulant long-term use     Arthritis     Atrial fibrillation     Congestive heart failure     COPD " (chronic obstructive pulmonary disease)     Deep vein thrombosis     elevated bilirubin d/t Gilbert's syndrome     confirmed by [place] genetic testing, evaluated by hepatology    Encounter for blood transfusion     GERD (gastroesophageal reflux disease)     Hypertension     Pheochromocytoma, malignant     Right kidney mass     Sleep apnea     Thyroid disease      Past Surgical History:   Procedure Laterality Date    APPENDECTOMY      BIOPSY, LIVER, TRANSJUGULAR APPROACH N/A 10/24/2018    Performed by [facility] Diagnostic Provider at [facility] OR 2ND FLR    CARDIAC DEFIBRILLATOR PLACEMENT Left 12/2016    EYE SURGERY      due to running tears    FRACTURE SURGERY Left     hand 5th digit    HEART CATH-RIGHT Right 7/22/2016    Performed by Rakesh Garcia MD at [facility] CATH LAB    HYSTERECTOMY      INSERTION-ICD-SINGLE N/A 12/2/2016    Performed by Bob Pretty MD at [facility] CATH LAB    KNEE SURGERY Left 2016    hematoma    LIVER BIOPSY  10/24/2018    Minimal steatosis, predominantly macrovesicular, 1%, Minimal nonspecific portal inflammation, no fibrosis. No findings on biopsy to explain elevated bilirubin levels. Could be d/t Gilbert's =?- hemolysis    PROCEDURE PROLAPSE HEMORRHOID (PPH) N/A 3/7/2017    Performed by GEORGE Rothman MD at [facility] OR    Right heart cath Right 2/6/2018    Performed by Benson Cortez MD at [facility] CATH LAB     Social History     Socioeconomic History    Marital status:      Spouse name: Not on file    Number of children: Not on file    Years of education: Not on file    Highest education level: Not on file   Occupational History    Not on file   Social Needs    Financial resource strain: Not on file    Food insecurity:     Worry: Not on file     Inability: Not on file    Transportation needs:     Medical: Not on file     Non-medical: Not on file   Tobacco Use    Smoking status: Never Smoker    Smokeless tobacco: Never Used   Substance and Sexual Activity     "Alcohol use: Yes     Frequency: Monthly or less     Drinks per session: 1 or 2     Comment: up to 1 yr ago drank 2-3 drinks on occasion but sporadic    Drug use: No    Sexual activity: Yes     Partners: Male   Lifestyle    Physical activity:     Days per week: Not on file     Minutes per session: Not on file    Stress: Not on file   Relationships    Social connections:     Talks on phone: Not on file     Gets together: Not on file     Attends Taoism service: Not on file     Active member of club or organization: Not on file     Attends meetings of clubs or organizations: Not on file     Relationship status: Not on file   Other Topics Concern    Not on file   Social History Narrative    On disability since      Family History   Problem Relation Age of Onset    Cancer Mother         pancreatic CA early 50's    Heart disease Father          MI in late 50's    Hypertension Father     Heart disease Sister     Heart disease Brother     Cirrhosis Brother         alcoholic    Heart disease Sister     Heart disease Brother     Hypertension Brother     Diabetes Brother        Review of patient's allergies indicates:   Allergen Reactions    Penicillins Hives    Percocet [oxycodone-acetaminophen] Other (See Comments)     Nausea, Dizziness, Anxiety.  "I don't like how it makes me feel."   Given Hydromorphone 0.5mg IVP  Without problems.    Diovan hct [valsartan-hydrochlorothiazide] Other (See Comments)     Causes coughing    Irinotecan      Pt has homozygosity for the TA7 promoter variant that places this individual at significantly increased risk for   severe neutropenia (grade 4) when treated with the standard dose of irinotecan (risk approximately 50%).   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 Metabolism of other drugs not listed here may also be impacted by UGT1A1 enzyme " activity.       Tramadol Nausea And Vomiting       Medication List with Changes/Refills   Current Medications    ALBUTEROL-IPRATROPIUM 2.5MG-0.5MG/3ML (DUO-NEB) 0.5 MG-3 MG(2.5 MG BASE)/3 ML NEBULIZER SOLUTION    Take 1 mL by nebulization every 6 (six) hours as needed for Wheezing or Shortness of Breath.    ALPRAZOLAM (XANAX) 1 MG TABLET    Take 1 mg by mouth nightly as needed for Anxiety.    ATORVASTATIN (LIPITOR) 40 MG TABLET    Take 1 tablet (40 mg total) by mouth once daily.    B COMPLEX VITAMINS TABLET    Take 1 tablet by mouth once daily.    CARVEDILOL (COREG) 25 MG TABLET    Take 1 tablet by mouth 2 (two) times daily.    CYANOCOBALAMIN, VITAMIN B-12, 50 MCG TABLET    Take 5 mcg by mouth once daily.    ELIQUIS 5 MG TAB    TAKE 1 TABLET BY MOUTH TWICE DAILY    ENTRESTO 49-51 MG PER TABLET    TAKE 1 TABLET BY MOUTH TWICE A DAY    FLUTICASONE-VILANTEROL (BREO ELLIPTA) 100-25 MCG/DOSE DISKUS INHALER    Inhale 1 puff into the lungs once daily. Controller    FUROSEMIDE (LASIX) 80 MG TABLET    Take 1 tablet (80 mg total) by mouth 2 (two) times daily. TAKE EXTRA DOSE FOR GAIN OF 2 OR MORE POUND WEIGHT IN 1 DAY OR 5 POUNDS IN 1 WEEK.    ISOSORBIDE DINITRATE (ISORDIL) 20 MG TABLET    Take 1 tablet (20 mg total) by mouth 2 (two) times daily. Please check that this is what you are already taking at home.    NITROSTAT 0.4 MG SL TABLET    Take 2.5 tablets (1 mg total) by mouth every 5 (five) minutes as needed for Chest pain. No more than 3 tablets in 15 minutes.    PANTOPRAZOLE (PROTONIX) 40 MG TABLET    Take 1 tablet by mouth once daily.    POLYETHYLENE GLYCOL (GOLYTELY,NULYTELY) 236-22.74-6.74 -5.86 GRAM SUSPENSION    Use as directed    POTASSIUM CHLORIDE (KLOR-CON) 10 MEQ TBSR    Take 1 tablet (10 mEq total) by mouth once daily.    ROPINIROLE (REQUIP) 0.5 MG TABLET    Take 0.5 mg by mouth once daily.    SPIRONOLACTONE (ALDACTONE) 25 MG TABLET    Take 1 tablet (25 mg total) by mouth once daily.    VENTOLIN HFA 90  "MCG/ACTUATION INHALER    Inhale 2 puffs into the lungs 2 (two) times daily as needed.     WALKER MISC    1 Device by Misc.(Non-Drug; Combo Route) route as needed.   Discontinued Medications    AMITRIPTYLINE (ELAVIL) 25 MG TABLET    Take 1 tablet (25 mg total) by mouth nightly as needed for Insomnia.       Review of Systems  Constitution: Denies chills, fever, and sweats.  HENT: Denies headaches or blurry vision.  Cardiovascular: Denies chest pain or irregular heart beat.  Respiratory: Denies cough or shortness of breath.  Gastrointestinal: Denies abdominal pain, nausea, or vomiting.  Musculoskeletal: Denies muscle cramps.  Neurological: Denies dizziness or focal weakness.  Psychiatric/Behavioral: Normal mental status.  Hematologic/Lymphatic: Denies bleeding problem or easy bruising/bleeding.  Skin: Denies rash or suspicious lesions    Physical Examination  BP (!) 154/91 (BP Location: Left arm, Patient Position: Sitting, BP Method: X-Large (Automatic))   Pulse 64   Ht 5' 6" (1.676 m)   Wt 95.3 kg (210 lb)   LMP  (LMP Unknown)   SpO2 96%   BMI 33.89 kg/m²     Constitutional: No acute distress, conversant  HEENT: Sclera anicteric, Pupils equal, round and reactive to light, extraocular motions intact, Oropharynx clear  Neck: No JVD, no carotid bruits  Cardiovascular: regular rate and rhythm, no murmur, rubs or gallops, normal S1/S2  Pulmonary: Clear to auscultation bilaterally  Abdominal: Abdomen soft, nontender, nondistended, positive bowel sounds  Extremities: No lower extremity edema,   Pulses:  Carotid pulses are 2+ on the right side, and 2+ on the left side.  Radial pulses are 2+ on the right side, and 2+ on the left side.   Femoral pulses are 2+ on the right side, and 2+ on the left side.  Popliteal pulses are 2+ on the right side, and 2+ on the left side.   Dorsalis pedis pulses are 2+ on the right side, and 2+ on the left side.   Posterior tibial pulses are 2+ on the right side, and 2+ on the left side.  "   Skin: No ecchymosis, erythema, or ulcers  Psych: Alert and oriented x 3, appropriate affect  Neuro: CNII-XII intact, no focal deficits    Labs:  Most Recent Data  CBC:   Lab Results   Component Value Date    WBC 5.64 04/09/2019    HGB 8.7 (L) 04/09/2019    HCT 26.4 (L) 04/09/2019     04/09/2019    MCV 54 (L) 04/09/2019    RDW 27.7 (H) 04/09/2019     BMP:   Lab Results   Component Value Date     (H) 04/09/2019    K 4.2 04/09/2019     04/09/2019    CO2 28 04/09/2019    BUN 25 (H) 04/09/2019    CREATININE 1.05 04/09/2019     (H) 04/09/2019    CALCIUM 9.5 04/09/2019    MG 1.6 01/28/2019    PHOS 3.8 10/21/2018     LFTS;   Lab Results   Component Value Date    PROT 7.6 02/14/2019    ALBUMIN 3.6 02/14/2019    BILITOT 1.9 (H) 02/14/2019    AST 15 02/14/2019    ALKPHOS 68 02/14/2019    ALT 11 02/14/2019     COAGS:   Lab Results   Component Value Date    INR 1.3 (H) 04/09/2019     FLP:   Lab Results   Component Value Date    CHOL 136 10/21/2018    HDL 59 10/21/2018    LDLCALC 62.0 (L) 10/21/2018    TRIG 75 10/21/2018    CHOLHDL 43.4 10/21/2018     CARDIAC:   Lab Results   Component Value Date    TROPONINI 0.104 (H) 01/28/2019    CKMB 1.82 02/19/2016     (H) 02/14/2019       EKG 4/3/2019:  Normal sinus rhythm  Left atrial enlargement  Left axis deviation  LVH with repolarization abnormality    Echo 10/22/2018:  CONCLUSIONS     1 - Severely depressed left ventricular systolic function (EF 20-25%).     2 - Impaired LV relaxation, increased LVEDP.     3 - Normal right ventricular systolic function .     4 - The estimated PA systolic pressure is 22 mmHg.     5 - Mild to moderate mitral regurgitation.       Assessment/Plan:  Hafsa Hawley is a 53 year old female with PMH of NICM (EF 20-25%) s/p AICD placement (Biotronic), HTN, PAF (on Eliquis), MELISSA (not compliant with CPAP), who presents for scheduled clinic visit.     1. NICM (EF 20-25%) s/p AICD placement (Biotronic)- Pt with NYHA III symptoms.   Followed by Advanced Heart Failure Service (Dr. Garcia).  Not on translant list.  Continue Entresto 49/51 mg bid. Continue lasix, imdur, spironolactone at current dosages.  Continue 2 gram sodium restriction and 1500cc fluid restriction.  Continue daily weights. If gains 3 lbs in 1 day or 5 lbs in 1 week, pt instructed to call our office.      2. Paroxysmal- SVT ablation rescheduled for 5/23/2019 with Dr. Pretty.  Continue to monitor.     3. PAF- Continue eliquis.      4. Morbid Obesity- Continue to encourage diet, exercise and weight loss.      Follow up in 4 months    Total duration of face to face visit time 30 minutes.  Total time spent counseling greater than fifty percent of total visit time.  Counseling included discussion regarding imaging findings, diagnosis, possibilities, treatment options, risks and benefits.  The patient had many questions regarding the options and long-term effects.    Andrea Pacheco MD, PhD  Interventional Cardiology

## 2019-05-07 ENCOUNTER — TELEPHONE (OUTPATIENT)
Dept: FAMILY MEDICINE | Facility: HOSPITAL | Age: 54
End: 2019-05-07

## 2019-05-07 NOTE — TELEPHONE ENCOUNTER
----- Message from Micaela Salvador sent at 5/6/2019  1:45 PM CDT -----  Pt has been having some problems with getting her shower chair. She has been trying to get it for 3 moths and no one has called her in regards to that. Please call pt back ASAP. Pt upset

## 2019-05-14 ENCOUNTER — CLINICAL SUPPORT (OUTPATIENT)
Dept: CARDIOLOGY | Facility: HOSPITAL | Age: 54
End: 2019-05-14
Attending: INTERNAL MEDICINE
Payer: MEDICAID

## 2019-05-14 DIAGNOSIS — Z95.810 CARDIAC DEFIBRILLATOR IN PLACE: ICD-10-CM

## 2019-05-19 ENCOUNTER — NURSE TRIAGE (OUTPATIENT)
Dept: ADMINISTRATIVE | Facility: CLINIC | Age: 54
End: 2019-05-19

## 2019-05-19 NOTE — TELEPHONE ENCOUNTER
Reason for Disposition   Nursing judgment    Protocols used: ST NO GUIDELINE OR REFERENCE [id number]    Pt c/o SOB x 3 min. Pt states KG=522, AL=747/107. Pt advised to call 911 and pt verbalizes understanding. EMS called per pt request and EMS states they will call pt. Pt notified of EMS call and pt verbalizes understanding.

## 2019-05-20 ENCOUNTER — DOCUMENTATION ONLY (OUTPATIENT)
Dept: TRANSPLANT | Facility: CLINIC | Age: 54
End: 2019-05-20

## 2019-05-20 ENCOUNTER — OFFICE VISIT (OUTPATIENT)
Dept: TRANSPLANT | Facility: CLINIC | Age: 54
End: 2019-05-20
Payer: MEDICAID

## 2019-05-20 VITALS
WEIGHT: 209.19 LBS | SYSTOLIC BLOOD PRESSURE: 143 MMHG | BODY MASS INDEX: 33.62 KG/M2 | HEART RATE: 91 BPM | HEIGHT: 66 IN | DIASTOLIC BLOOD PRESSURE: 94 MMHG

## 2019-05-20 DIAGNOSIS — I48.0 PAROXYSMAL ATRIAL FIBRILLATION: Chronic | ICD-10-CM

## 2019-05-20 DIAGNOSIS — G47.33 OSA (OBSTRUCTIVE SLEEP APNEA): Chronic | ICD-10-CM

## 2019-05-20 DIAGNOSIS — Z95.810 ICD (IMPLANTABLE CARDIOVERTER-DEFIBRILLATOR) IN PLACE: Chronic | ICD-10-CM

## 2019-05-20 DIAGNOSIS — N18.30 CHRONIC KIDNEY DISEASE, STAGE III (MODERATE): Chronic | ICD-10-CM

## 2019-05-20 DIAGNOSIS — I50.42 CHRONIC COMBINED SYSTOLIC AND DIASTOLIC HEART FAILURE: Primary | ICD-10-CM

## 2019-05-20 DIAGNOSIS — I51.7 LEFT VENTRICULAR HYPERTROPHY: Chronic | ICD-10-CM

## 2019-05-20 DIAGNOSIS — I10 ESSENTIAL HYPERTENSION: Chronic | ICD-10-CM

## 2019-05-20 DIAGNOSIS — I42.8 NICM (NONISCHEMIC CARDIOMYOPATHY): Chronic | ICD-10-CM

## 2019-05-20 PROCEDURE — 99213 OFFICE O/P EST LOW 20 MIN: CPT | Mod: PBBFAC,NTX | Performed by: INTERNAL MEDICINE

## 2019-05-20 PROCEDURE — 99215 OFFICE O/P EST HI 40 MIN: CPT | Mod: S$PBB,NTX,, | Performed by: INTERNAL MEDICINE

## 2019-05-20 PROCEDURE — 99999 PR PBB SHADOW E&M-EST. PATIENT-LVL III: CPT | Mod: PBBFAC,TXP,, | Performed by: INTERNAL MEDICINE

## 2019-05-20 PROCEDURE — 99999 PR PBB SHADOW E&M-EST. PATIENT-LVL III: ICD-10-PCS | Mod: PBBFAC,TXP,, | Performed by: INTERNAL MEDICINE

## 2019-05-20 PROCEDURE — 99215 PR OFFICE/OUTPT VISIT, EST, LEVL V, 40-54 MIN: ICD-10-PCS | Mod: S$PBB,NTX,, | Performed by: INTERNAL MEDICINE

## 2019-05-20 NOTE — PROGRESS NOTES
Received notification that pt had been seen in [facility] ED with c/o SOB and chest pain. Pt treated and released. Pt in clinic today for f/u appt with Dr. Garcia. Pt reports feeling tired s/p ED visit. Business cared with contact information provided to pt. Encourage pt to call with question or concerns. Pt reports appt for ablation with Dr. Pretty on 05/23.

## 2019-05-20 NOTE — PROGRESS NOTES
Subjective:     HPI:  Ms. Hawley is a 53 y.o. year old Black or  female who has presents for one month follow up.Seen by Dr Lorenzo to be evaluated as a potential heart transplant recipient (referred by Junior / Dana)   At her initial visit in July 2018, she was told to complete her pheochromocytoma. She was seen by [facility] who feel she is unlikely to have pheochromocytoma  They recommend that we can pursue heart transplant workup.    She is here for follow up after hospitalization. Doing better now mild SOB FC II NYHA.    Since last visit she has seen EP and she will have an ablation    Patient was placed on I.V Lasix and this improved SOB and currently home lasix increased to 80 mg BID. Small dose aldactone added too. Patient asked to take extra dose lasix for gain of weight as advised before. She did not take extra dose for past 2 months as thought weight gain was sec to change in diet habits and her gaining weight and not fluid. Also asked to be compliant with low salt diet(was not for past 2 months as she was with family or went out to eat). A flutter on admit sec to acute on chronic Combined HF. Was on Amiodarone drip for only few hours and then had sinus bradycardia. Currently in NSR and rate controlled with home dose Coreg. Cont Eliquis. She is now being d/beronica home in a stable condition and cont to f/u with Cardiology as out patient.      PREVIOUS HISTORY    Patient most recently admitted at [facility] with ADHF, BNP significantly elevated at 2573, had to be diuresed, additionally had episode of VT x 1, and chest pain overnight. Had elevated troponin as well at around .6, felt to be due to ADHF and tachycardia. Bili was elevated as well, bili all the way up to 5.6. Last visit was sent to hepatology and they plan to do US  elastography but felt ok to proceed with LVAD/OHT workup. Last visit she was to increase isordil to 2 tablets TID but did not do this. Today, feels less  SOB. Has some mild musculoskeletal pains she feels is related to her AICD.      TTE 08/31/18:    1 - Severely depressed left ventricular systolic function (EF 20-25%).     2 - Impaired LV relaxation, elevated LAP (grade 2 diastolic dysfunction).     3 - Pulmonary hypertension. The estimated PA systolic pressure is 43 mmHg.     4 - Mild mitral regurgitation.     5 - Increased central venous pressure.     6 - Moderate pulmonic regurgitation.     7- LVH     Six minute walk 06/21/2018---------Distance: 292.61 meters (960 feet)     O2 Sat % Supplemental Oxygen Heart Rate Blood Pressure Haseeb   Scale   Pre-exercise  (Resting) 98 % Room Air 70 bpm 173/97 mmHg 0.5   During Exercise 95 % Room Air 80 bpm 143/100 mmHg 5-6   Post-exercise  (Recovery) 98 % Room Air  68 bpm 141/93 mmHg       FINAL PATHOLOGIC DIAGNOSIS  Liver, random, biopsy:  - Minimal steatosis, predominantly macrovesicular, 1%.  - Minimal nonspecific portal inflammation.  - No significant fibrosis seen.  - PAS-D and copper pending, results will be issued in an addendum.  - See comment.  COMMENT: The biopsy is adequate for evaluation. The patient's history of elevated total bilirubin is noted and  select information from the electronic medical record is reviewed. The biopsy is relatively unremarkable with  minimal steatosis and nonspecific portal inflammation. There is no definitive steatohepatitis. There is no  cholestasis or ductular reaction seen. The bile ducts are unremarkable. There is no significant plasma cell  population seen. There are no granulomas seen. There is no increased iron on iron stain. There is no increased  fibrosis on trichrome stain. There is no clear etiology for elevated bilirubin seen on this biopsy. Appropriate positive  controls are examined.  Diagnosed      Past Medical History:   Diagnosis Date    Anemia     Anticoagulant long-term use     Arthritis     Atrial fibrillation     Congestive heart failure     COPD (chronic  obstructive pulmonary disease)     Deep vein thrombosis     elevated bilirubin d/t Gilbert's syndrome     confirmed by [place] genetic testing, evaluated by hepatology    Encounter for blood transfusion     GERD (gastroesophageal reflux disease)     Hypertension     Pheochromocytoma, malignant     Right kidney mass     Sleep apnea     Thyroid disease      Past Surgical History:   Procedure Laterality Date    APPENDECTOMY      BIOPSY, LIVER, TRANSJUGULAR APPROACH N/A 10/24/2018    Performed by [facility] Diagnostic Provider at [facility] OR 2ND FLR    CARDIAC DEFIBRILLATOR PLACEMENT Left 2016    EYE SURGERY      due to running tears    FRACTURE SURGERY Left     hand 5th digit    HEART CATH-RIGHT Right 2016    Performed by Rakesh Garcia MD at [facility] CATH LAB    HYSTERECTOMY      INSERTION-ICD-SINGLE N/A 2016    Performed by Bob Pretty MD at [facility] CATH LAB    KNEE SURGERY Left 2016    hematoma    LIVER BIOPSY  10/24/2018    Minimal steatosis, predominantly macrovesicular, 1%, Minimal nonspecific portal inflammation, no fibrosis. No findings on biopsy to explain elevated bilirubin levels. Could be d/t Gilbert's =?- hemolysis    PROCEDURE PROLAPSE HEMORRHOID (PPH) N/A 3/7/2017    Performed by GEORGE Rothman MD at [facility] OR    Right heart cath Right 2018    Performed by Benson Cortez MD at [facility] CATH LAB     OB History        2    Para   2    Term   2            AB        Living           SAB        TAB        Ectopic        Multiple        Live Births                   Review of Systems   Constitution: Negative for chills, decreased appetite, diaphoresis, fever and weight gain.   Cardiovascular: Positive for dyspnea on exertion. Negative for chest pain, claudication, cyanosis and paroxysmal nocturnal dyspnea.   Respiratory: Negative for cough, hemoptysis, shortness of breath, sleep disturbances due to breathing and snoring.    Skin: Negative for nail changes.  "  Gastrointestinal: Negative for bloating, nausea and vomiting.   Neurological: Negative for weakness.   Psychiatric/Behavioral: Negative for depression.       Objective:   Blood pressure (!) 143/94, pulse 91, height 5' 6" (1.676 m), weight 94.9 kg (209 lb 3.5 oz).body mass index is 33.77 kg/m².  Physical Exam   Constitutional: She appears well-developed.   HENT:   Head: Normocephalic.   Eyes: Pupils are equal, round, and reactive to light.   Neck: Normal range of motion. No JVD present.   Cardiovascular: Normal rate. Exam reveals no friction rub.   No murmur heard.  Pulmonary/Chest: Effort normal. No respiratory distress.   Abdominal: Soft. She exhibits no distension. There is no tenderness.   Musculoskeletal: Normal range of motion. She exhibits no edema.   Neurological: She is alert. No cranial nerve deficit.   Skin: Skin is warm. She is not diaphoretic. No erythema.       Labs:    Chemistry        Component Value Date/Time     05/19/2019 1659    K 3.9 05/19/2019 1659     05/19/2019 1659    CO2 22 (L) 05/19/2019 1659    BUN 22 (H) 05/19/2019 1659    CREATININE 1.08 05/19/2019 1659     (H) 05/19/2019 1659        Component Value Date/Time    CALCIUM 9.3 05/19/2019 1659    ALKPHOS 79 05/19/2019 1659    AST 27 05/19/2019 1659    ALT 23 05/19/2019 1659    BILITOT 1.9 (H) 05/19/2019 1659    ESTGFRAFRICA >60.0 05/19/2019 1659    EGFRNONAA 58.7 (A) 05/19/2019 1659          Magnesium   Date Value Ref Range Status   01/28/2019 1.6 1.6 - 2.6 mg/dL Final     Lab Results   Component Value Date    WBC 6.27 05/19/2019    HGB 9.4 (L) 05/19/2019    HCT 27.9 (L) 05/19/2019     05/19/2019     Lab Results   Component Value Date    INR 1.2 05/16/2019    INR 1.3 (H) 04/09/2019    INR 1.2 01/27/2019     BNP   Date Value Ref Range Status   02/14/2019 301 (H) 0 - 99 pg/mL Final     Comment:     Values of less than 100 pg/ml are consistent with non-CHF populations.   10/21/2018 924 (H) 0 - 99 pg/mL Final     " Comment:     Values of less than 100 pg/ml are consistent with non-CHF populations.   09/07/2018 471 (H) 0 - 99 pg/mL Final     Comment:     Values of less than 100 pg/ml are consistent with non-CHF populations.     LD   Date Value Ref Range Status   06/11/2018 308 (H) 110 - 260 U/L Final     Comment:     Results are increased in hemolyzed samples.   08/23/2016 280 (H) 110 - 260 U/L Final     Comment:     Results are increased in hemolyzed samples.     No results found for this or any previous visit.  No results found for this or any previous visit.    Assessment:      1. Chronic combined systolic and diastolic heart failure    2. Essential hypertension    3. ICD (implantable cardioverter-defibrillator) in place    4. Left ventricular hypertrophy    5. NICM (nonischemic cardiomyopathy)    6. Paroxysmal atrial fibrillation    7. Chronic kidney disease, stage III (moderate)    8. MELISSA (obstructive sleep apnea)        Plan:   HFrEF FC III NYHA  Stable On entresto 49/51    It seems that decompensation could have related to AFIB SVT She will have an ablation    TC in 4 months.       Patient is now NYHA III  Recommend 2 gram sodium restriction and 1500cc fluid restriction.  Encourage physical activity with graded exercise program.  Requested patient to weigh themselves daily, and to notify us if their weight increases by more than 3 lbs in 1 day or 5 lbs in 1 week.     Listed for transplant: No

## 2019-05-20 NOTE — LETTER
May 20, 2019        Andrea Pacheco  [address]  [address]  [facility] [address]  Phone: [phone]  Fax: [phone]             [facility] [address] [place] [address]  Phone: [phone]   Patient: Hafsa Hawley   MR Number: [id number]   YOB: 1965   Date of Visit: 5/20/2019       Dear Dr. Andrea Pacheco    Thank you for referring Hafsa Hawley to me for evaluation. Attached you will find relevant portions of my assessment and plan of care.    If you have questions, please do not hesitate to call me. I look forward to following Hafsa Hawley along with you.    Sincerely,    Armando Garcia MD    Enclosure    If you would like to receive this communication electronically, please contact [email or web address] or [phone] to request [employer] access.    [employer] Link is a tool which provides read-only access to select patient information with whom you have a relationship. Its easy to use and provides real time access to review your patients record including encounter summaries, notes, results, and demographic information.    If you feel you have received this communication in error or would no longer like to receive these types of communications, please e-mail [email or web address]

## 2019-05-22 ENCOUNTER — TELEPHONE (OUTPATIENT)
Dept: ELECTROPHYSIOLOGY | Facility: CLINIC | Age: 54
End: 2019-05-22

## 2019-05-22 NOTE — TELEPHONE ENCOUNTER
Spoke to Hafsa Hawley    CONFIRMED procedure arrival time of 5:45am on 5/23/19  Reiterated instructions including:  -Directions to check in desk  -NPO after midnight night prior to procedure  -High importance of HOLDING Coreg for 3 days prior (Last dose on 5/19) and Eliquis on day of procedure (last dose on 5/22)   -Confirmed compliance of Eliquis  -Pre-procedure LABS 5/16  -Do not wear mascara day of procedure     Pt verbalizes understanding of above and appreciates call.

## 2019-05-22 NOTE — NURSING
Patient Instructions  Cardiac Ablation    Pre-Procedure Testin/16 at 5:45am     · Be sure to arrive at your scheduled time. You do NOT need to fast for this blood work.       Important Medication Information:    · HOLD (do NOT take) Eliquis on the day of your procedure.  Your last dose should be taken on  19  · HOLD (do NOT take) Coreg 3  days prior to your procedure.  Your last dose should be taken on19  · You may take your other usual morning medications with a sip of water on the day of your procedure.         Day of Procedure:  19 at 5:45 am    Please report to Cardiology Waiting Room on the 3rd floor of the Hospital    · Do not eat or drink anything after 12 midnight on the night before your procedure.  · Please do not wear makeup, especially mascara, when arriving for your procedure.  · You will be SPENDING THE NIGHT AFTER YOUR PROCEDURE  · You will need someone to drive you home from the hospital due to anesthesia.   · You are allowed one guest to stay with you if you are scheduled to stay over night.  There is a pull out chair or sofa in each room for your guest to sleep.  · If you wear a CPAP or BIPAP machine for sleep apnea, please be sure to bring your machine with you if you are scheduled to spend the night.        Directions to Cardiology Waiting Room  If you park in the Parking Garage:  Take elevators to the 2nd floor  Walk up ramp and turn right by Gold Elevators  Take elevator to the 3rd floor  Upon exiting the elevator, turn away from the clinic areas  Walk long pérez around to front of hospital to area with windows overlooking [facility]  Check in at Reception Desk  OR  If family is dropping you off:  Have them drop you off at the front of the Hospital  (Near the ER, where all the flags are hung).  Take the E elevators to the 3rd floor.  Check in at the Reception Desk in the waiting room.      Post Ablation Instructions:     No pushing, pulling, or lifting greater than  5 pounds for ONE week.   No long car rides (greater than 1 hour) for ONE week.  If a long car ride is required, we ask that you stop every hour and walk around for a few minutes.    No driving for 24 hours after procedure due to anesthesia.   No sitting in water for ONE week (this includes baths, pools, and hot tubs).    It is ok to go up and down home stairs as needed.       Any need to reschedule or cancel procedures, or any questions regarding your procedures should be addressed directly with the Arrhythmia Department Nurses at the following phone number: [phone].

## 2019-05-23 ENCOUNTER — ANESTHESIA (OUTPATIENT)
Dept: MEDSURG UNIT | Facility: HOSPITAL | Age: 54
End: 2019-05-23
Payer: MEDICAID

## 2019-05-23 ENCOUNTER — ANESTHESIA EVENT (OUTPATIENT)
Dept: MEDSURG UNIT | Facility: HOSPITAL | Age: 54
End: 2019-05-23
Payer: MEDICAID

## 2019-05-23 ENCOUNTER — HOSPITAL ENCOUNTER (OUTPATIENT)
Facility: HOSPITAL | Age: 54
Discharge: HOME | End: 2019-05-23
Attending: INTERNAL MEDICINE | Admitting: INTERNAL MEDICINE
Payer: MEDICAID

## 2019-05-23 VITALS
TEMPERATURE: 98 F | HEART RATE: 80 BPM | OXYGEN SATURATION: 95 % | SYSTOLIC BLOOD PRESSURE: 136 MMHG | WEIGHT: 204 LBS | BODY MASS INDEX: 32.78 KG/M2 | HEIGHT: 66 IN | DIASTOLIC BLOOD PRESSURE: 80 MMHG | RESPIRATION RATE: 16 BRPM

## 2019-05-23 DIAGNOSIS — I47.10 SVT (SUPRAVENTRICULAR TACHYCARDIA): Primary | ICD-10-CM

## 2019-05-23 PROCEDURE — 93005 ELECTROCARDIOGRAM TRACING: CPT | Mod: NTX

## 2019-05-23 PROCEDURE — 93623 PRGRMD STIMJ&PACG IV RX NFS: CPT | Mod: 26,NTX,, | Performed by: INTERNAL MEDICINE

## 2019-05-23 PROCEDURE — 27201423 OPTIME MED/SURG SUP & DEVICES STERILE SUPPLY: Mod: NTX | Performed by: INTERNAL MEDICINE

## 2019-05-23 PROCEDURE — 93010 EKG 12-LEAD: ICD-10-PCS | Mod: NTX,,, | Performed by: INTERNAL MEDICINE

## 2019-05-23 PROCEDURE — C1730 CATH, EP, 19 OR FEW ELECT: HCPCS | Mod: NTX | Performed by: INTERNAL MEDICINE

## 2019-05-23 PROCEDURE — D9220A PRA ANESTHESIA: Mod: ANES,NTX,, | Performed by: ANESTHESIOLOGY

## 2019-05-23 PROCEDURE — 25000003 PHARM REV CODE 250: Mod: NTX | Performed by: INTERNAL MEDICINE

## 2019-05-23 PROCEDURE — 93653 COMPRE EP EVAL TX SVT: CPT | Mod: NTX | Performed by: INTERNAL MEDICINE

## 2019-05-23 PROCEDURE — 93623 PR STIM/PACING HEART POST IV DRUG INFU: ICD-10-PCS | Mod: 26,NTX,, | Performed by: INTERNAL MEDICINE

## 2019-05-23 PROCEDURE — 25000003 PHARM REV CODE 250: Mod: NTX | Performed by: NURSE PRACTITIONER

## 2019-05-23 PROCEDURE — 93613 INTRACARDIAC EPHYS 3D MAPG: CPT | Mod: TXP | Performed by: INTERNAL MEDICINE

## 2019-05-23 PROCEDURE — 63600175 PHARM REV CODE 636 W HCPCS: Mod: NTX | Performed by: NURSE PRACTITIONER

## 2019-05-23 PROCEDURE — 93653 PR ELECTROPHYS EVAL, COMPREHEN, W/SUPRAVENT TACHYCARD TRMT: ICD-10-PCS | Mod: NTX,,, | Performed by: INTERNAL MEDICINE

## 2019-05-23 PROCEDURE — D9220A PRA ANESTHESIA: Mod: CRNA,NTX,, | Performed by: NURSE ANESTHETIST, CERTIFIED REGISTERED

## 2019-05-23 PROCEDURE — 63600175 PHARM REV CODE 636 W HCPCS: Mod: NTX | Performed by: NURSE ANESTHETIST, CERTIFIED REGISTERED

## 2019-05-23 PROCEDURE — C1894 INTRO/SHEATH, NON-LASER: HCPCS | Mod: NTX | Performed by: INTERNAL MEDICINE

## 2019-05-23 PROCEDURE — 93010 ELECTROCARDIOGRAM REPORT: CPT | Mod: NTX,,, | Performed by: INTERNAL MEDICINE

## 2019-05-23 PROCEDURE — 00537 ANES CARDIAC EP PROCEDURES: CPT | Mod: NTX | Performed by: INTERNAL MEDICINE

## 2019-05-23 PROCEDURE — 93623 PRGRMD STIMJ&PACG IV RX NFS: CPT | Mod: NTX | Performed by: INTERNAL MEDICINE

## 2019-05-23 PROCEDURE — 93613 PR INTRACARD ELECTROPHYS 3-DIMENS MAPPING: ICD-10-PCS | Mod: NTX,,, | Performed by: INTERNAL MEDICINE

## 2019-05-23 PROCEDURE — C1732 CATH, EP, DIAG/ABL, 3D/VECT: HCPCS | Mod: TXP | Performed by: INTERNAL MEDICINE

## 2019-05-23 PROCEDURE — 25000003 PHARM REV CODE 250: Mod: NTX | Performed by: NURSE ANESTHETIST, CERTIFIED REGISTERED

## 2019-05-23 PROCEDURE — 93010 ELECTROCARDIOGRAM REPORT: CPT | Mod: 76,NTX,, | Performed by: INTERNAL MEDICINE

## 2019-05-23 PROCEDURE — 37000008 HC ANESTHESIA 1ST 15 MINUTES: Mod: NTX | Performed by: INTERNAL MEDICINE

## 2019-05-23 PROCEDURE — 37000009 HC ANESTHESIA EA ADD 15 MINS: Mod: NTX | Performed by: INTERNAL MEDICINE

## 2019-05-23 PROCEDURE — D9220A PRA ANESTHESIA: ICD-10-PCS | Mod: ANES,NTX,, | Performed by: ANESTHESIOLOGY

## 2019-05-23 PROCEDURE — 93653 COMPRE EP EVAL TX SVT: CPT | Mod: NTX,,, | Performed by: INTERNAL MEDICINE

## 2019-05-23 PROCEDURE — 93613 INTRACARDIAC EPHYS 3D MAPG: CPT | Mod: NTX,,, | Performed by: INTERNAL MEDICINE

## 2019-05-23 PROCEDURE — D9220A PRA ANESTHESIA: ICD-10-PCS | Mod: CRNA,NTX,, | Performed by: NURSE ANESTHETIST, CERTIFIED REGISTERED

## 2019-05-23 RX ORDER — PROPOFOL 10 MG/ML
VIAL (ML) INTRAVENOUS
Status: DISCONTINUED | OUTPATIENT
Start: 2019-05-23 | End: 2019-05-23

## 2019-05-23 RX ORDER — SODIUM CHLORIDE 0.9 % (FLUSH) 0.9 %
10 SYRINGE (ML) INJECTION
Status: DISCONTINUED | OUTPATIENT
Start: 2019-05-23 | End: 2019-05-23 | Stop reason: HOSPADM

## 2019-05-23 RX ORDER — LIDOCAINE HYDROCHLORIDE 20 MG/ML
INJECTION, SOLUTION EPIDURAL; INFILTRATION; INTRACAUDAL; PERINEURAL
Status: DISCONTINUED | OUTPATIENT
Start: 2019-05-23 | End: 2019-05-23 | Stop reason: HOSPADM

## 2019-05-23 RX ORDER — VANCOMYCIN HCL IN 5 % DEXTROSE 1G/250ML
1000 PLASTIC BAG, INJECTION (ML) INTRAVENOUS
Status: DISPENSED | OUTPATIENT
Start: 2019-05-23

## 2019-05-23 RX ORDER — FENTANYL CITRATE 50 UG/ML
INJECTION, SOLUTION INTRAMUSCULAR; INTRAVENOUS
Status: DISCONTINUED | OUTPATIENT
Start: 2019-05-23 | End: 2019-05-23

## 2019-05-23 RX ORDER — HYDROMORPHONE HYDROCHLORIDE 1 MG/ML
0.2 INJECTION, SOLUTION INTRAMUSCULAR; INTRAVENOUS; SUBCUTANEOUS EVERY 5 MIN PRN
Status: DISCONTINUED | OUTPATIENT
Start: 2019-05-23 | End: 2019-05-23 | Stop reason: HOSPADM

## 2019-05-23 RX ORDER — KETAMINE HYDROCHLORIDE 10 MG/ML
INJECTION, SOLUTION INTRAMUSCULAR; INTRAVENOUS
Status: DISCONTINUED | OUTPATIENT
Start: 2019-05-23 | End: 2019-05-23

## 2019-05-23 RX ORDER — LIDOCAINE HCL/PF 100 MG/5ML
SYRINGE (ML) INTRAVENOUS
Status: DISCONTINUED | OUTPATIENT
Start: 2019-05-23 | End: 2019-05-23

## 2019-05-23 RX ORDER — PROPOFOL 10 MG/ML
VIAL (ML) INTRAVENOUS CONTINUOUS PRN
Status: DISCONTINUED | OUTPATIENT
Start: 2019-05-23 | End: 2019-05-23

## 2019-05-23 RX ORDER — ACETAMINOPHEN 325 MG/1
650 TABLET ORAL EVERY 6 HOURS PRN
Status: DISCONTINUED | OUTPATIENT
Start: 2019-05-23 | End: 2019-05-23 | Stop reason: HOSPADM

## 2019-05-23 RX ORDER — SODIUM CHLORIDE 9 MG/ML
INJECTION, SOLUTION INTRAVENOUS CONTINUOUS
Status: ACTIVE | OUTPATIENT
Start: 2019-05-23

## 2019-05-23 RX ADMIN — SODIUM CHLORIDE: 0.9 INJECTION, SOLUTION INTRAVENOUS at 07:05

## 2019-05-23 RX ADMIN — PROPOFOL 10 MG: 10 INJECTION, EMULSION INTRAVENOUS at 09:05

## 2019-05-23 RX ADMIN — FENTANYL CITRATE 50 MCG: 50 INJECTION, SOLUTION INTRAMUSCULAR; INTRAVENOUS at 10:05

## 2019-05-23 RX ADMIN — ACETAMINOPHEN 650 MG: 325 TABLET ORAL at 10:05

## 2019-05-23 RX ADMIN — KETAMINE HYDROCHLORIDE 20 MG: 10 INJECTION, SOLUTION INTRAMUSCULAR; INTRAVENOUS at 08:05

## 2019-05-23 RX ADMIN — PROPOFOL 10 MG: 10 INJECTION, EMULSION INTRAVENOUS at 08:05

## 2019-05-23 RX ADMIN — PROPOFOL 40 MG: 10 INJECTION, EMULSION INTRAVENOUS at 07:05

## 2019-05-23 RX ADMIN — Medication 1000 MG: at 07:05

## 2019-05-23 RX ADMIN — LIDOCAINE HYDROCHLORIDE 40 MG: 20 INJECTION, SOLUTION INTRAVENOUS at 07:05

## 2019-05-23 RX ADMIN — KETAMINE HYDROCHLORIDE 10 MG: 10 INJECTION, SOLUTION INTRAMUSCULAR; INTRAVENOUS at 09:05

## 2019-05-23 RX ADMIN — ISOPROTERENOL HYDROCHLORIDE 2 MCG/MIN: 0.2 INJECTION, SOLUTION INTRAMUSCULAR; INTRAVENOUS at 08:05

## 2019-05-23 RX ADMIN — PROPOFOL 100 MCG/KG/MIN: 10 INJECTION, EMULSION INTRAVENOUS at 07:05

## 2019-05-23 NOTE — PLAN OF CARE
Problem: Adult Inpatient Plan of Care  Goal: Plan of Care Review  Outcome: Ongoing (interventions implemented as appropriate)  Received report from LAURIE Crystal in pacu. Patient s/p SVT RFA, AAOx3. VSS, no c/o pain or discomfort at this time, resp even and unlabored. Gauze/tegaderm dressing to bilateral groin is CDI. No active bleeding. No hematoma noted. Post procedure protocol reviewed with patient and patient's family. Understanding verbalized. Family members at bedside. Nurse call bell within reach. Will continue to monitor per post procedure protocol.

## 2019-05-23 NOTE — OP NOTE
EP Post Procedure Note    Successful slow pathway modification without any immediate complications    Plan  - Bed rest x 4 hours, Q30 minute groin checks  - ECG when patient arrives to floor  - Advance diet as tolerated  - DC home later today. Resume Coreg today and Eliquis tomorrow    Reinier Red MD, MPH  PGY-VI  Cardiovascular Disease Fellow

## 2019-05-23 NOTE — ANESTHESIA PREPROCEDURE EVALUATION
"                                                                                                             05/23/2019  Pre-operative evaluation for Procedure(s) (LRB):  Ablation, SVT, Accessory Pathway (N/A)    Hafsa Hawley is a 53 y.o. female hx of COPD (well controlled on medications), HTN, MELISSA (no CPAP), CKDIII (stable never on HD), s/p ICD with EF 20    TTE 08/31/18:    1 - Severely depressed left ventricular systolic function (EF 20-25%).     2 - Impaired LV relaxation, elevated LAP (grade 2 diastolic dysfunction).     3 - Pulmonary hypertension. The estimated PA systolic pressure is 43 mmHg.     4 - Mild mitral regurgitation.     5 - Increased central venous pressure.     6 - Moderate pulmonic regurgitation.     7- LVH    Patient Active Problem List   Diagnosis    Fibromyalgia affecting multiple sites    Microcytic anemia    Essential hypertension    MELISSA (obstructive sleep apnea)    Paroxysmal atrial fibrillation    NICM (nonischemic cardiomyopathy)    Lumbar degenerative disc disease    COPD (chronic obstructive pulmonary disease)    Impaired functional mobility and activity tolerance    Chronic combined systolic and diastolic heart failure    Left ventricular hypertrophy    Chronic kidney disease, stage III (moderate)    Anxiety and depression    ICD (implantable cardioverter-defibrillator) in place    Paroxysmal supraventricular tachycardia    elevated bilirubin d/t Gilbert's syndrome    Arrhythmia    Screen for colon cancer    Rash    SVT (supraventricular tachycardia)       Review of patient's allergies indicates:   Allergen Reactions    Penicillins Hives    Percocet [oxycodone-acetaminophen] Other (See Comments)     Nausea, Dizziness, Anxiety.  "I don't like how it makes me feel."   Given Hydromorphone 0.5mg IVP  Without problems.   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 Metabolism of other drugs not listed here may also be impacted by UGT1A1 enzyme activity.       Tramadol Nausea And Vomiting       No current facility-administered medications on file prior to encounter.      Current Outpatient Medications on File Prior to Encounter   Medication Sig Dispense Refill    ALPRAZolam (XANAX) 1 MG tablet Take 1 mg by mouth nightly as needed for Anxiety.      atorvastatin (LIPITOR) 40 MG tablet Take 1 tablet (40 mg total) by mouth once daily. 90 tablet 3    b complex vitamins tablet Take 1 tablet by mouth once daily.      cyanocobalamin, vitamin B-12, 50 mcg tablet Take 5 mcg by mouth once daily.      ELIQUIS 5 mg Tab TAKE 1 TABLET BY MOUTH TWICE DAILY 60 tablet 0    fluticasone-vilanterol (BREO ELLIPTA) 100-25 mcg/dose diskus inhaler Inhale 1 puff into the lungs once daily. Controller      furosemide (LASIX) 80 MG tablet Take 1 tablet (80 mg total) by mouth 2 (two) times daily. TAKE EXTRA DOSE FOR GAIN OF 2 OR MORE POUND WEIGHT IN 1 DAY OR 5 POUNDS IN 1 WEEK. 60 tablet 1    isosorbide dinitrate (ISORDIL) 20 MG tablet Take 1 tablet (20 mg total) by mouth 2 (two) times daily. Please check that this is what you are already taking at home.      pantoprazole (PROTONIX) 40 MG tablet Take 1 tablet by mouth once daily.  5    potassium chloride (KLOR-CON) 10 MEQ TbSR Take 1 tablet (10 mEq total) by mouth once daily.      rOPINIRole (REQUIP) 0.5 MG tablet Take 0.5 mg by mouth once daily.      spironolactone (ALDACTONE) 25 MG tablet Take 1 tablet (25 mg total) by mouth once daily. 30 tablet 1    VENTOLIN HFA 90 mcg/actuation inhaler Inhale 2  puffs into the lungs 2 (two) times daily as needed.   11    albuterol-ipratropium 2.5mg-0.5mg/3mL (DUO-NEB) 0.5 mg-3 mg(2.5 mg base)/3 mL nebulizer solution Take 1 mL by nebulization every 6 (six) hours as needed for Wheezing or Shortness of Breath. 1 Box 2    carvedilol (COREG) 25 MG tablet Take 1 tablet by mouth 2 (two) times daily.      NITROSTAT 0.4 mg SL tablet Take 2.5 tablets (1 mg total) by mouth every 5 (five) minutes as needed for Chest pain. No more than 3 tablets in 15 minutes.      walker Misc 1 Device by Misc.(Non-Drug; Combo Route) route as needed.  0       Past Surgical History:   Procedure Laterality Date    APPENDECTOMY      BIOPSY, LIVER, TRANSJUGULAR APPROACH N/A 10/24/2018    Performed by [facility] at [facility] OR 2ND FLR    CARDIAC DEFIBRILLATOR PLACEMENT Left 12/2016    EYE SURGERY      due to running tears    FRACTURE SURGERY Left     hand 5th digit    HEART CATH-RIGHT Right 7/22/2016    Performed by Rakesh Garcia [facility] at [facility] CATH LAB    HYSTERECTOMY      INSERTION-ICD-SINGLE N/A 12/2/2016    Performed by Bob Pretty MD at [facility] CATH LAB    KNEE SURGERY Left 2016    hematoma    LIVER BIOPSY  10/24/2018    Minimal steatosis, predominantly macrovesicular, 1%, Minimal nonspecific portal inflammation, no fibrosis. No findings on biopsy to explain elevated bilirubin levels. Could be d/t Gilbert's =?- hemolysis    PROCEDURE PROLAPSE HEMORRHOID (PPH) N/A 3/7/2017    Performed by GEORGE Rothman MD at [facility] OR    Right heart cath Right 2/6/2018    Performed by Benson Cortez MD at [facility] CATH LAB       Social History     Socioeconomic History    Marital status:      Spouse name: Not on file    Number of children: Not on file    Years of education: Not on file    Highest education level: Not on file   Occupational History    Not on file   Social Needs    Financial resource strain: Not on file    Food insecurity:     Worry: Not on file     Inability: Not  on file    Transportation needs:     Medical: Not on file     Non-medical: Not on file   Tobacco Use    Smoking status: Never Smoker    Smokeless tobacco: Never Used   Substance and Sexual Activity    Alcohol use: Yes     Frequency: Monthly or less     Drinks per session: 1 or 2     Comment: up to 1 yr ago drank 2-3 drinks on occasion but sporadic    Drug use: No    Sexual activity: Yes     Partners: Male   Lifestyle    Physical activity:     Days per week: Not on file     Minutes per session: Not on file    Stress: Not on file   Relationships    Social connections:     Talks on phone: Not on file     Gets together: Not on file     Attends [employer] service: Not on file     Active member of club or organization: Not on file     Attends meetings of clubs or organizations: Not on file     Relationship status: Not on file   Other Topics Concern    Not on file   Social History Narrative    On disability since 2013         CBC: No results for input(s): WBC, RBC, HGB, HCT, PLT, MCV, MCH, MCHC in the last 72 hours.    CMP: No results for input(s): NA, K, CL, CO2, BUN, CREATININE, GLU, MG, PHOS, CALCIUM, ALBUMIN, PROT, ALKPHOS, ALT, AST, BILITOT in the last 72 hours.    INR  No results for input(s): PT, INR, PROTIME, APTT in the last 72 hours.        Diagnostic Studies:      EKG:  Sinus tachycardia with 1st degree A-V block  Left axis deviation  LVH with repolarization abnormality  Abnormal ECG  When compared with ECG of 03-APR-2019 12:11,  WI interval has increased  Vent. rate has increased BY  70 BPM  ST now depressed in Inferior leads  ST more depressed in Lateral leads  Inverted T waves have replaced nonspecific T wave abnormality in Inferior  leads    2D Echo:  Results for orders placed or performed during the hospital encounter of 10/21/18   2D echo with color flow doppler   Result Value Ref Range    QEF 20 (A) 55 - 65    Mitral Valve Regurgitation MILD TO MODERATE     Diastolic Dysfunction Yes (A)     Est.  PA Systolic Pressure 22.01     Mitral Valve Mobility NORMAL     Tricuspid Valve Regurgitation TRIVIAL          Anesthesia Evaluation    I have reviewed the Patient Summary Reports.     I have reviewed the Nursing Notes.   I have reviewed the Medications.     Review of Systems  Anesthesia Hx:  No problems with previous Anesthesia  History of prior surgery of interest to airway management or planning: Denies Family Hx of Anesthesia complications.   Denies Personal Hx of Anesthesia complications.   Hematology/Oncology:         -- Denies Anemia:   Cardiovascular:   Exercise tolerance: poor Pacemaker Hypertension Denies CABG/stent. Dysrhythmias  CHF LAGUERRE ECG has been reviewed.    Pulmonary:   COPD, mild Sleep Apnea    Renal/:   Chronic Renal Disease    Hepatic/GI:   GERD, well controlled Denies Liver Disease.    Neurological:   Denies CVA. Denies Seizures.    Endocrine:   Denies Diabetes.        Physical Exam  General:  Well nourished    Airway/Jaw/Neck:  Airway Findings: Mouth Opening: Normal Tongue: Normal  General Airway Assessment: Adult  Mallampati: II  Improves to II with phonation.  TM Distance: Normal, at least 6 cm  Jaw/Neck Findings:  Neck ROM: Normal ROM      Dental:  Dental Findings: In tact   Chest/Lungs:  Chest/Lungs Findings: Clear to auscultation, Normal Respiratory Rate     Heart/Vascular:  Heart Findings: Rate: Normal  Rhythm: Regular Rhythm  Sounds: Normal        Mental Status:  Mental Status Findings:  Cooperative, Alert and Oriented         Anesthesia Plan  Type of Anesthesia, risks & benefits discussed:  Anesthesia Type:  general, MAC  Patient's Preference:   Intra-op Monitoring Plan: standard ASA monitors  Intra-op Monitoring Plan Comments:   Post Op Pain Control Plan: per primary service following discharge from PACU  Post Op Pain Control Plan Comments:   Induction:   IV  Beta Blocker:  Patient is on a Beta-Blocker and has not received dose within the past 24 hours due to non-compliance or for  other reasons (Patient should receive a perioperative dose or document why it is withheld).     Beta Blocker Comments: Held for procedure  Informed Consent: Patient understands risks and agrees with Anesthesia plan.  Questions answered. Anesthesia consent signed with patient.  ASA Score: 4     Day of Surgery Review of History & Physical:    H&P update referred to the provider.         Ready For Surgery From Anesthesia Perspective.

## 2019-05-23 NOTE — DISCHARGE INSTRUCTIONS
1. Do not strain or lift anything greater than 5 lb for 1 week.   2. Do not drive or operate any dangerous machinery for 24 hours.   3. Keep the dressing on, clean, and dry for 24 hours.   4. After 24 hours, the dressing may be removed and a shower is allowed.   5. Clean the area with mild soap and water.   6. Once the skin has healed (1 week), bathing in a tub or swimming is allowed.   7. Inspect the groin site daily and report to the physician any signs of infection at the site: redness, pain, fever >100.4, unusual pain at the   access site or affected extremity, unusual swelling at the access site, or any yellow, white or green drainage.    Call 911 if you have:   Bleeding from the puncture site that you cannot stop by doing the following:   Relax and lie down right away. Keep your leg flat and apply firm pressure to the site using your fingers and a gauze pad. Keep the pressure on for 10 minutes. Continue this until the bleeding stops. This may take awhile. When bleeding stops, cover the site with a sterile bandage and keep your leg still as much as possible.    Restart Eliquis tomorrow (5/24/19)

## 2019-05-23 NOTE — NURSING TRANSFER
Nursing Transfer Note      5/23/2019     Transfer To: ep pacu 4 to sscu 319    Transfer via stretcher    Transfer with cardiac monitoring tele box 319    Transported by qamar li    Medicines sent: none    Chart send with patient: Yes    Notified: sister    Patient reassessed at: 5/23/19 1125. Next due at 1145    Upon arrival to floor: cardiac monitor applied, patient oriented to room, call bell in reach and bed in lowest position

## 2019-05-23 NOTE — PLAN OF CARE
"Vss. sats 99% on 2l nc.  Pt states "I have fibromyalgia."  Prn po meds given for back pain and groin pain per md order. Pt states "tolerable upon transfer to sscu."  Denies sob. brittaney groin hemostasis 1010. Left and right groin gauze/trans film noted.  No drainage or hematoma noted.  Pt tolerating sips of water in ep pacu.  Upon arrival to sscu 319, pt states "I have to use the bathroom."  Pt coughed strongly and left side noted with sang drainage. Manual pressure being held by burak sscu rn.  sscu rn to notify md.  Pt aaox4.  Pt placed on bed pan as well while mantaining manual pressure to left groin.  Right groin noted with scant amount of sang drainage to top of drsg. No hematoma noted. Palpable pulses noted. See flowsheet for full assessment. Pt's sister updated by ep pacu rn. Verbalizes understanding.   "

## 2019-05-23 NOTE — HPI
Hafsa Hawley is a 53 y.o. female who presents for follow-up of No chief complaint on file.  .      HPI:     Ms. Hawley is a 53 y.o. female with NICM, CHF, HTN, Sleep Apnea, paroxysmal atrial fibrillation (on eliquis), Fibromyalgia, ICD here for follow up.      Background:     Primary cardiologist is Dr. Cortez.  Longstanding history of non-ischemic cardiomyopathy.  [facility] 11/15/15 EF 30-35% with normal coronary arteries.  Has been on optimal medical therapy.  Admitted to [facility] 9/16 with acute decompensated Heart failure. Diuresed 12 liters.  Echo 8/24/16 EF 20%.  Repeat echo 10/17/16 EF 30-35% with small pericardial effusion.  ICD implanted 12/2/16 (VDD single pass lead).     Update (04/03/2019):     Patient has had several hospitalizations since last clinic visit:     8/20/2018: Went to ED with chest tightness and palpitations. HR in 150s and found to be in SVT. Given diltiazem and spontaneously converted back to sinus rhythm.   8/30/2018: CHF hospitalization. No arrhythmia during this admission.  10/21/2018: ED with CP. No arrhythmia during this admission. Device check 10/29/2019 showed SVTx2, max 52 seconds during this period.  11/17/2018: ED with CP. SVT on EKG. Spontaneous conversion.  1/27/2019: ED with chest pain and SOB. She was given Adnoesine 6mg x 1 with conversion to SR. Device check 1/30/2019 showed SVTx19, longest 10 minutes.     Today she says that she has been very stressed, unsure if she is going to have episodes of palpitations at any minute. Episodes occur usually when at rest, and are associated with CP and SOB. She is also being managed by HTS. Denies syncope.     She is currently taking eliquis 5mg BID for stroke prophylaxis and denies significant bleeding episodes. She is currently being treated with coreg 25mg BID for HR control. Kidney function is stable, with a creatinine of 1.3 on 2/14/2019.     Recent visit to ED on 5/19 with chest discomfort, found to be in SVT (ECG reviewed,  short RP tachycardia) with conversion to NSR with IV Cardizem push.    ECG today shows NSR with PVCs

## 2019-05-23 NOTE — TRANSFER OF CARE
"Anesthesia Transfer of Care Note    Patient: Hafsa Hawley    Procedure(s) Performed: Procedure(s) (LRB):  Ablation, SVT, Accessory Pathway (N/A)    Patient location: PACU    Anesthesia Type: general    Transport from OR: Transported from OR on room air with adequate spontaneous ventilation    Post pain: pain needs to be addressed    Post assessment: no apparent anesthetic complications and tolerated procedure well    Post vital signs: stable    Level of consciousness: awake, alert and oriented    Nausea/Vomiting: no nausea/vomiting    Complications: none          Last vitals:   Visit Vitals  BP (!) 146/89 (BP Location: Right arm, Patient Position: Lying)   Pulse 78   Temp 36.6 °C (97.9 °F) (Temporal)   Resp 14   Ht 5' 6" (1.676 m)   Wt 92.5 kg (204 lb)   LMP  (LMP Unknown)   SpO2 97%   Breastfeeding? No   BMI 32.93 kg/m²     "

## 2019-05-23 NOTE — SUBJECTIVE & OBJECTIVE
"Past Medical History:   Diagnosis Date    Anemia     Anticoagulant long-term use     Arthritis     Atrial fibrillation     Congestive heart failure     COPD (chronic obstructive pulmonary disease)     Deep vein thrombosis     elevated bilirubin d/t Gilbert's syndrome     confirmed by [facility] genetic testing, evaluated by hepatology    Encounter for blood transfusion     GERD (gastroesophageal reflux disease)     Hypertension     Pheochromocytoma, malignant     Right kidney mass     Sleep apnea     Thyroid disease        Past Surgical History:   Procedure Laterality Date    APPENDECTOMY      BIOPSY, LIVER, TRANSJUGULAR APPROACH N/A 10/24/2018    Performed by [facility] OR [facility]    CARDIAC DEFIBRILLATOR PLACEMENT Left 12/2016    EYE SURGERY      due to running tears    FRACTURE SURGERY Left     hand 5th digit    HEART CATH-RIGHT Right 7/22/2016    Performed by Rakesh Garcia MD at [facility] CATH LAB    HYSTERECTOMY      INSERTION-ICD-SINGLE N/A 12/2/2016    Performed by Bob Pretty MD at [facility] CATH LAB    KNEE SURGERY Left 2016    hematoma    LIVER BIOPSY  10/24/2018    Minimal steatosis, predominantly macrovesicular, 1%, Minimal nonspecific portal inflammation, no fibrosis. No findings on biopsy to explain elevated bilirubin levels. Could be d/t Gilbert's =?- hemolysis    PROCEDURE PROLAPSE HEMORRHOID (PPH) N/A 3/7/2017    Performed by GEORGE Rothman MD at [facility] OR    Right heart cath Right 2/6/2018    Performed by Benson Cortez MD at [facility] CATH LAB       Review of patient's allergies indicates:   Allergen Reactions    Penicillins Hives    Percocet [oxycodone-acetaminophen] Other (See Comments)     Nausea, Dizziness, Anxiety.  "I don't like how it makes me feel."   Given Hydromorphone 0.5mg IVP  Without problems.   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 Metabolism of other drugs not listed here may also be impacted by UGT1A1 enzyme activity.       Tramadol Nausea And Vomiting       No current facility-administered medications on file prior to encounter.      Current Outpatient Medications on File Prior to Encounter   Medication Sig    ALPRAZolam (XANAX) 1 MG tablet Take 1 mg by mouth nightly as needed for Anxiety.    atorvastatin (LIPITOR) 40 MG tablet Take 1 tablet (40 mg total) by mouth once daily.    b complex vitamins tablet Take 1 tablet by mouth once daily.    cyanocobalamin, vitamin B-12, 50 mcg tablet Take 5 mcg by mouth once daily.    ELIQUIS 5 mg Tab TAKE 1 TABLET BY MOUTH TWICE DAILY    fluticasone-vilanterol (BREO ELLIPTA) 100-25 mcg/dose diskus inhaler Inhale 1 puff into the lungs once daily. Controller    furosemide (LASIX) 80 MG tablet Take 1 tablet (80 mg total) by mouth 2 (two) times daily. TAKE EXTRA DOSE FOR GAIN OF 2 OR MORE POUND WEIGHT IN 1 DAY OR 5 POUNDS IN 1 WEEK.    isosorbide dinitrate (ISORDIL) 20 MG tablet Take 1 tablet (20 mg total) by mouth 2 (two) times daily. Please check that this is what you are already taking at home.    pantoprazole (PROTONIX) 40 MG tablet Take 1 tablet by mouth once daily.    potassium chloride (KLOR-CON) 10 MEQ TbSR Take 1 tablet (10 mEq total) by mouth once daily.    rOPINIRole (REQUIP) 0.5 MG tablet Take 0.5 mg by mouth once daily.    spironolactone (ALDACTONE) 25 MG tablet Take 1 tablet (25 mg total) by mouth once daily.    VENTOLIN HFA 90 mcg/actuation inhaler Inhale 2 puffs into the lungs 2 (two) times daily as needed.     albuterol-ipratropium  2.5mg-0.5mg/3mL (DUO-NEB) 0.5 mg-3 mg(2.5 mg base)/3 mL nebulizer solution Take 1 mL by nebulization every 6 (six) hours as needed for Wheezing or Shortness of Breath.    carvedilol (COREG) 25 MG tablet Take 1 tablet by mouth 2 (two) times daily.    NITROSTAT 0.4 mg SL tablet Take 2.5 tablets (1 mg total) by mouth every 5 (five) minutes as needed for Chest pain. No more than 3 tablets in 15 minutes.    walker Misc 1 Device by Misc.(Non-Drug; Combo Route) route as needed.     Family History     Problem Relation (Age of Onset)    Cancer Mother    Cirrhosis Brother    Diabetes Brother    Heart disease Father, Sister, Brother, Sister, Brother    Hypertension Father, Brother        Tobacco Use    Smoking status: Never Smoker    Smokeless tobacco: Never Used   Substance and Sexual Activity    Alcohol use: Yes     Frequency: Monthly or less     Drinks per session: 1 or 2     Comment: up to 1 yr ago drank 2-3 drinks on occasion but sporadic    Drug use: No    Sexual activity: Yes     Partners: Male     Review of Systems   Constitution: Negative for chills and fever.   HENT: Negative for hoarse voice and sore throat.    Cardiovascular: Positive for palpitations. Negative for chest pain, claudication, cyanosis, dyspnea on exertion, irregular heartbeat, leg swelling, near-syncope, orthopnea, paroxysmal nocturnal dyspnea and syncope.   Respiratory: Negative for cough, hemoptysis and shortness of breath.    Musculoskeletal: Negative for back pain, joint pain and joint swelling.   Gastrointestinal: Negative for abdominal pain, constipation, diarrhea, hematochezia, melena, nausea and vomiting.   Genitourinary: Negative for dysuria, hematuria and incomplete emptying.   Neurological: Negative for dizziness, headaches and light-headedness.   Psychiatric/Behavioral: Negative for altered mental status, depression and suicidal ideas. The patient is not nervous/anxious.      Objective:     Vital Signs (Most Recent):  Temp: 98 °F  (36.7 °C) (05/23/19 0558)  Pulse: 91 (05/23/19 0558)  Resp: 18 (05/23/19 0558)  BP: (!) 147/90 (05/23/19 0559)  SpO2: 95 % (05/23/19 0558) Vital Signs (24h Range):  Temp:  [98 °F (36.7 °C)] 98 °F (36.7 °C)  Pulse:  [91] 91  Resp:  [18] 18  SpO2:  [95 %] 95 %  BP: (147-150)/(90) 147/90       Weight: 92.5 kg (204 lb)  Body mass index is 32.93 kg/m².    SpO2: 95 %  O2 Device (Oxygen Therapy): room air    Physical Exam   Constitutional: She is oriented to person, place, and time. She appears well-developed and well-nourished. No distress.   HENT:   Head: Normocephalic and atraumatic.   Right Ear: External ear normal.   Left Ear: External ear normal.   Nose: Nose normal.   Mouth/Throat: Oropharynx is clear and moist. No oropharyngeal exudate.   Eyes: Conjunctivae and EOM are normal. Right eye exhibits no discharge. Left eye exhibits no discharge. No scleral icterus.   Neck: Normal range of motion. Neck supple. No JVD present. No tracheal deviation present. No thyromegaly present.   Cardiovascular: Normal rate, regular rhythm, normal heart sounds and intact distal pulses. Exam reveals no gallop and no friction rub.   No murmur heard.  Pulmonary/Chest: Effort normal and breath sounds normal. No stridor. No respiratory distress. She has no wheezes. She has no rales. She exhibits no tenderness.   Abdominal: Soft. Bowel sounds are normal. She exhibits no distension and no mass. There is no tenderness. There is no rebound and no guarding.   Musculoskeletal: Normal range of motion. She exhibits no edema, tenderness or deformity.   Lymphadenopathy:     She has no cervical adenopathy.   Neurological: She is alert and oriented to person, place, and time. No cranial nerve deficit.   Skin: Skin is warm and dry. No rash noted. She is not diaphoretic. No erythema. No pallor.   Psychiatric: She has a normal mood and affect. Her behavior is normal. Thought content normal.   Nursing note and vitals reviewed.

## 2019-05-23 NOTE — ADDENDUM NOTE
Addendum  created 05/23/19 1235 by Nicole A. Lombardi, CRNA    Intraprocedure Flowsheets edited

## 2019-05-23 NOTE — ASSESSMENT & PLAN NOTE
I discussed the nature of EP study and ablation, including possible transseptal puncture. We discused risks and benefits at length. Our discussion included, but was not limited to the risk of death, infection, bleeding, stroke, MI, cardiac perforation, embolism, cardiac tamponade, skin burns, and other organic injury including the possibility for need for surgery or pacemaker implantation.    Last dose of Coreg on 5/18 and last dose of Eliquis in AM of 5/23    Proceed with EPS +/- RFA for SVT  KO, MAC  1g Vancomycin prior

## 2019-05-23 NOTE — ANESTHESIA POSTPROCEDURE EVALUATION
Anesthesia Post Evaluation    Patient: Hafsa Hawley    Procedure(s) Performed: Procedure(s) (LRB):  Ablation, SVT, Accessory Pathway (N/A)    Final Anesthesia Type: general  Patient location during evaluation: PACU  Patient participation: Yes- Able to Participate  Level of consciousness: awake and alert and oriented  Post-procedure vital signs: reviewed and stable  Pain management: adequate  Airway patency: patent  PONV status at discharge: No PONV  Anesthetic complications: no      Cardiovascular status: blood pressure returned to baseline, hemodynamically stable and stable  Respiratory status: unassisted, room air and spontaneous ventilation  Hydration status: euvolemic  Follow-up not needed.          Vitals Value Taken Time   /90 5/23/2019 11:16 AM   Temp 36.1 °C (97 °F) 5/23/2019 11:00 AM   Pulse 77 5/23/2019 11:27 AM   Resp 16 5/23/2019 11:27 AM   SpO2 99 % 5/23/2019 11:27 AM   Vitals shown include unvalidated device data.      Event Time     Out of Recovery 11:39:00          Pain/Oralia Score: Pain Rating Prior to Med Admin: 6 (5/23/2019 10:25 AM)  Pain Rating Post Med Admin: 2 (5/23/2019 11:15 AM)  Oralia Score: 9 (5/23/2019 11:15 AM)

## 2019-05-23 NOTE — DISCHARGE SUMMARY
Discharge Summary  Electrophysiology      Admit Date: 5/23/2019    Discharge Date:  5/23/2019    Attending Physician: Bob Pretty MD    Discharge Physician: Reinier Red MD    Principal Diagnoses: <principal problem not specified>  Indication for Admission: EPS + RFA    Discharged Condition: Good    Hospital Course:   Patient presented for outpatient EPS + RFA which went without complication. A slow pathway modification was performed without any immediate complications and patient was discharged the same day.    Outpatient Plan:  - Follow-up appointment in 4-6 weeks with Bob Pretty MD  - There were no medication changes    Diet: Cardiac diet    Activity: Ad kiki, wound care instructions provided    Disposition: Home or Self Care    Discharge Medications:      Medication List      CHANGE how you take these medications    apixaban 5 mg Tab  Commonly known as:  ELIQUIS  Take 1 tablet (5 mg total) by mouth 2 (two) times daily.  Start taking on:  5/24/2019  What changed:  how much to take        CONTINUE taking these medications    albuterol-ipratropium 2.5 mg-0.5 mg/3 mL nebulizer solution  Commonly known as:  DUO-NEB  Take 1 mL by nebulization every 6 (six) hours as needed for Wheezing or Shortness of Breath.     ALPRAZolam 1 MG tablet  Commonly known as:  XANAX     atorvastatin 40 MG tablet  Commonly known as:  LIPITOR  Take 1 tablet (40 mg total) by mouth once daily.     b complex vitamins tablet     BREO ELLIPTA 100-25 mcg/dose diskus inhaler  Generic drug:  fluticasone furoate-vilanterol     carvedilol 25 MG tablet  Commonly known as:  COREG     cyanocobalamin (vitamin B-12) 50 mcg tablet     ENTRESTO 49-51 mg per tablet  Generic drug:  sacubitril-valsartan  TAKE 1 TABLET BY MOUTH TWICE A DAY     furosemide 80 MG tablet  Commonly known as:  LASIX  Take 1 tablet (80 mg total) by mouth 2 (two) times daily. TAKE EXTRA DOSE FOR GAIN OF 2 OR MORE POUND WEIGHT IN 1 DAY OR 5 POUNDS IN 1 WEEK.     isosorbide  dinitrate 20 MG tablet  Commonly known as:  ISORDIL  Take 1 tablet (20 mg total) by mouth 2 (two) times daily. Please check that this is what you are already taking at home.     NITROSTAT 0.4 MG SL tablet  Generic drug:  nitroGLYCERIN  Take 2.5 tablets (1 mg total) by mouth every 5 (five) minutes as needed for Chest pain. No more than 3 tablets in 15 minutes.     pantoprazole 40 MG tablet  Commonly known as:  PROTONIX     potassium chloride 10 MEQ Tbsr  Commonly known as:  KLOR-CON  Take 1 tablet (10 mEq total) by mouth once daily.     rOPINIRole 0.5 MG tablet  Commonly known as:  REQUIP     spironolactone 25 MG tablet  Commonly known as:  ALDACTONE  Take 1 tablet (25 mg total) by mouth once daily.     VENTOLIN HFA 90 mcg/actuation inhaler  Generic drug:  albuterol     walker Misc  1 Device by Misc.(Non-Drug; Combo Route) route as needed.           Where to Get Your Medications      These medications were sent to [facility]/pharmacy #[id number] - [place] - [address] AT [facility] OF [facility]  [address], [place] [address]    Phone:  [phone]   · apixaban 5 mg Tab

## 2019-05-28 ENCOUNTER — TELEPHONE (OUTPATIENT)
Dept: FAMILY MEDICINE | Facility: HOSPITAL | Age: 54
End: 2019-05-28

## 2019-05-28 RX ORDER — SACUBITRIL AND VALSARTAN 49; 51 MG/1; MG/1
TABLET, FILM COATED ORAL
Qty: 60 TABLET | Refills: 0 | Status: SHIPPED | OUTPATIENT
Start: 2019-05-28 | End: 2019-06-28 | Stop reason: SDUPTHER

## 2019-05-28 NOTE — TELEPHONE ENCOUNTER
----- Message from Emelia Guzmán MA sent at 5/28/2019  1:04 PM CDT -----  Patient requests a call to number above.  Said she has left several messages and has requested a shower chair. Also needs to discuss another inhaler.  Please call patient to discuss further.  Thanks.

## 2019-05-29 ENCOUNTER — TELEPHONE (OUTPATIENT)
Dept: FAMILY MEDICINE | Facility: HOSPITAL | Age: 54
End: 2019-05-29

## 2019-05-29 DIAGNOSIS — I50.42 CHRONIC COMBINED SYSTOLIC AND DIASTOLIC HEART FAILURE: Primary | ICD-10-CM

## 2019-06-05 ENCOUNTER — TELEPHONE (OUTPATIENT)
Dept: FAMILY MEDICINE | Facility: HOSPITAL | Age: 54
End: 2019-06-05

## 2019-06-05 NOTE — TELEPHONE ENCOUNTER
----- Message from Josr Gray sent at 6/4/2019  2:33 PM CDT -----  PT CALL STATING HER  ASK IF DR CORDON CAN TRY SENDING ORDER TO [facility]  [phone] FAX: [phone] TO GET HER SHOWER CHAIR.

## 2019-06-07 DIAGNOSIS — R53.1 WEAKNESS: ICD-10-CM

## 2019-06-07 DIAGNOSIS — M79.7 FIBROMYALGIA: Primary | ICD-10-CM

## 2019-06-18 ENCOUNTER — TELEPHONE (OUTPATIENT)
Dept: FAMILY MEDICINE | Facility: HOSPITAL | Age: 54
End: 2019-06-18

## 2019-06-18 NOTE — TELEPHONE ENCOUNTER
----- Message from Micaela Salvador sent at 6/11/2019  2:59 PM CDT -----  Pt needs a shower chair. She says she gave the information to the Dr and it was suppose to be faxed back. Have not heard from anyone for 2 weeks. Please call pt ASAP

## 2019-06-19 NOTE — TELEPHONE ENCOUNTER
----- Message from Emelia Guzmán MA sent at 6/18/2019 11:42 AM CDT -----  Patient requesting return call to number above; needs to discuss shower chair and insurance will not pay for advair or brio inhalers.  Please call patient.  Thanks.

## 2019-06-25 ENCOUNTER — TELEPHONE (OUTPATIENT)
Dept: FAMILY MEDICINE | Facility: HOSPITAL | Age: 54
End: 2019-06-25

## 2019-06-25 DIAGNOSIS — R53.1 WEAKNESS: Primary | ICD-10-CM

## 2019-06-25 NOTE — TELEPHONE ENCOUNTER
Shower chair has been faxed to DME store.  Advair is covered by insurance  Called patient and left message.

## 2019-06-27 DIAGNOSIS — I42.8 NICM (NONISCHEMIC CARDIOMYOPATHY): Primary | Chronic | ICD-10-CM

## 2019-06-28 RX ORDER — SACUBITRIL AND VALSARTAN 49; 51 MG/1; MG/1
TABLET, FILM COATED ORAL
Qty: 60 TABLET | Refills: 0 | Status: SHIPPED | OUTPATIENT
Start: 2019-06-28 | End: 2019-08-03 | Stop reason: SDUPTHER

## 2019-07-03 NOTE — PROGRESS NOTES
Ms. Hawley is a patient of Dr. Pretty and was last seen in clinic 4/3/2019.      Subjective:   Patient ID:  Hafsa Hawley is a 53 y.o. female who presents for follow-up of Cardiomyopathy  .     HPI:    Ms. Hawley is a 53 y.o. female with NICM, CHF, HTN, Sleep Apnea, paroxysmal atrial fibrillation (on eliquis), Fibromyalgia, ICD here for follow up after SVT ablation.     Background:    Primary cardiologist is Dr. Cortez.  Longstanding history of non-ischemic cardiomyopathy.  [facility] 11/15/15 EF 30-35% with normal coronary arteries.  Has been on optimal medical therapy.  Admitted to [facility] 9/16 with acute decompensated Heart failure. Diuresed 12 liters.  Echo 8/24/16 EF 20%.  Repeat echo 10/17/16 EF 30-35% with small pericardial effusion.  ICD implanted 12/2/16 (VDD single pass lead).    8/20/2018: Went to ED with chest tightness and palpitations. HR in 150s and found to be in SVT. Given diltiazem and spontaneously converted back to sinus rhythm.   8/30/2018: CHF hospitalization. No arrhythmia during this admission.  10/21/2018: ED with CP. No arrhythmia during this admission. Device check 10/29/2019 showed SVTx2, max 52 seconds during this period.  11/17/2018: ED with CP. SVT on EKG. Spontaneous conversion.  1/27/2019: ED with chest pain and SOB. She was given Adnoesine 6mg x 1 with conversion to SR. Device check 1/30/2019 showed SVTx19, longest 10 minutes.  On EKG SVT c/w AVNRT. Ablation planned.    Update (07/09/2019):    Underwent successful slow pathway modification. on 5/23/2019.  Today she says she has not experienced sustained palpitations since her ablation. She does continue to experience her usual chronic chest pain. Denies worsening LAGUERRE, light-headedness, or syncope. She does have fatigue. She has not yet taken her medications.     She is currently taking eliquis 5mg BID for stroke prophylaxis and denies significant bleeding episodes. She is currently being treated with carvedilol 25mg BID for HR  control.  Kidney function is stable, with a creatinine of 0.9 on 6/21/2019.  Device Interrogation shows no SVT since her ablation.   I have personally reviewed the patient's EKG today, which shows sinus rhythm at 66bpm. GA interval is 180. QTc is 454.      Recent Cardiac Tests:    2D Echo (10/22/2018):  CONCLUSIONS     1 - Severely depressed left ventricular systolic function (EF 20-25%).     2 - Impaired LV relaxation, increased LVEDP.     3 - Normal right ventricular systolic function .     4 - The estimated PA systolic pressure is 22 mmHg.     5 - Mild to moderate mitral regurgitation.     Current Outpatient Medications   Medication Sig    albuterol-ipratropium 2.5mg-0.5mg/3mL (DUO-NEB) 0.5 mg-3 mg(2.5 mg base)/3 mL nebulizer solution Take 1 mL by nebulization every 6 (six) hours as needed for Wheezing or Shortness of Breath.    ALPRAZolam (XANAX) 1 MG tablet Take 1 mg by mouth nightly as needed for Anxiety.    apixaban (ELIQUIS) 5 mg Tab Take 1 tablet (5 mg total) by mouth 2 (two) times daily.    atorvastatin (LIPITOR) 40 MG tablet Take 1 tablet (40 mg total) by mouth once daily.    b complex vitamins tablet Take 1 tablet by mouth once daily.    carvedilol (COREG) 25 MG tablet Take 1 tablet by mouth 2 (two) times daily.    cyanocobalamin, vitamin B-12, 50 mcg tablet Take 5 mcg by mouth once daily.    ENTRESTO 49-51 mg per tablet TAKE 1 TABLET BY MOUTH TWICE A DAY    fluticasone-vilanterol (BREO ELLIPTA) 100-25 mcg/dose diskus inhaler Inhale 1 puff into the lungs once daily. Controller    furosemide (LASIX) 80 MG tablet Take 1 tablet (80 mg total) by mouth 2 (two) times daily. TAKE EXTRA DOSE FOR GAIN OF 2 OR MORE POUND WEIGHT IN 1 DAY OR 5 POUNDS IN 1 WEEK.    isosorbide dinitrate (ISORDIL) 20 MG tablet Take 1 tablet (20 mg total) by mouth 2 (two) times daily. Please check that this is what you are already taking at home.    NITROSTAT 0.4 mg SL tablet Take 2.5 tablets (1 mg total) by mouth every 5  "(five) minutes as needed for Chest pain. No more than 3 tablets in 15 minutes.    pantoprazole (PROTONIX) 40 MG tablet Take 1 tablet by mouth once daily.    potassium chloride (KLOR-CON) 10 MEQ TbSR Take 1 tablet (10 mEq total) by mouth once daily.    rOPINIRole (REQUIP) 0.5 MG tablet Take 0.5 mg by mouth once daily.    spironolactone (ALDACTONE) 25 MG tablet Take 1 tablet (25 mg total) by mouth once daily.    VENTOLIN HFA 90 mcg/actuation inhaler Inhale 2 puffs into the lungs 2 (two) times daily as needed.     walker Misc 1 Device by Misc.(Non-Drug; Combo Route) route as needed.    ergocalciferol (ERGOCALCIFEROL) 50,000 unit Cap ergocalciferol (vitamin D2) 50,000 unit capsule   Take 1 capsule every week by oral route.    iron-vit c-vit b12-folic acid (IRON-C PLUS) tablet iron     No current facility-administered medications for this visit.      Facility-Administered Medications Ordered in Other Visits   Medication    0.9%  NaCl infusion    vancomycin in dextrose 5 % 1 gram/250 mL IVPB 1,000 mg     Review of Systems   Constitution: Negative for malaise/fatigue.   Cardiovascular: Positive for chest pain. Negative for dyspnea on exertion, irregular heartbeat, leg swelling and palpitations.   Respiratory: Negative for shortness of breath.    Hematologic/Lymphatic: Negative for bleeding problem.   Skin: Negative for rash.   Musculoskeletal: Negative for myalgias.   Gastrointestinal: Negative for hematemesis, hematochezia and nausea.   Genitourinary: Negative for hematuria.   Neurological: Negative for light-headedness.   Psychiatric/Behavioral: Negative for altered mental status.   Allergic/Immunologic: Negative for persistent infections.       Objective:          BP (!) 152/74   Pulse 66   Ht 5' 6" (1.676 m)   Wt 96.8 kg (213 lb 6.5 oz)   LMP  (LMP Unknown)   BMI 34.44 kg/m²     Physical Exam   Constitutional: She is oriented to person, place, and time. She appears well-developed and well-nourished. "   HENT:   Head: Normocephalic.   Nose: Nose normal.   Eyes: Pupils are equal, round, and reactive to light.   Cardiovascular: Normal rate, regular rhythm, S1 normal and S2 normal.   No murmur heard.  Pulses:       Radial pulses are 2+ on the right side, and 2+ on the left side.   Pulmonary/Chest: Breath sounds normal. No respiratory distress.   Abdominal: Normal appearance.   Musculoskeletal: Normal range of motion. She exhibits no edema.   Neurological: She is alert and oriented to person, place, and time.   Skin: Skin is warm and dry. No erythema.   Psychiatric: She has a normal mood and affect. Her speech is normal and behavior is normal.   Nursing note and vitals reviewed.        Lab Results   Component Value Date     06/21/2019    K 3.9 06/21/2019    MG 1.6 01/28/2019    BUN 27 (H) 06/21/2019    CREATININE 0.99 06/21/2019    ALT 16 06/21/2019    AST 22 06/21/2019    HGB 9.2 (L) 06/21/2019    HCT 27.9 (L) 06/21/2019    TSH 0.760 10/21/2018    LDLCALC 62.0 (L) 10/21/2018       Recent Labs   Lab 01/27/19  2135 04/09/19  1548 05/16/19  0936 06/21/19  1136   INR 1.2 1.3 H 1.2 1.1       Assessment:     1. ICD (implantable cardioverter-defibrillator) in place    2. Paroxysmal atrial fibrillation    3. NICM (nonischemic cardiomyopathy)    4. Essential hypertension    5. Paroxysmal supraventricular tachycardia    6. MELISSA (obstructive sleep apnea)      Plan:     In summary, Ms. Hawley is a 53 y.o. female with NICM, CHF, HTN, Sleep Apnea, paroxysmal atrial fibrillation (on eliquis), Fibromyalgia, ICD here for follow up after SVT ablation.   She is doing well from a rhythm perspective, with no SVT noted on her device since her slow pathway modification 6 weeks ago. She continues to experience her chronic chest/arm pain. Nuclear stress test 10/2018 negative for ischemia. At her recent ED visit for CP 2 weeks ago, her BNP was improved. No RV pacing her last device report.  Will obtain an echo prior to clinic visit with  Dr. Garcia in September to evaluate for improvement in LV function with controlled arrhythmia. She remains on eliquis for CVA prophylaxis. BP elevated in clinic today and she has not taken her meds.  - I emphasized the importance of medication compliance.    Echo prior to visit with Dr. Garcia in September.   Continue medications.  Continue routine device checks.  RTC in 6 months, sooner if needed.    *A copy of this note has been sent to Dr. Pretty*    Follow up in about 6 months (around 1/9/2020).    ------------------------------------------------------------------    LYNN Fields, NP-C  Cardiac Electrophysiology

## 2019-07-09 ENCOUNTER — OFFICE VISIT (OUTPATIENT)
Dept: ELECTROPHYSIOLOGY | Facility: CLINIC | Age: 54
End: 2019-07-09
Payer: MEDICAID

## 2019-07-09 ENCOUNTER — HOSPITAL ENCOUNTER (OUTPATIENT)
Dept: CARDIOLOGY | Facility: CLINIC | Age: 54
Discharge: HOME | End: 2019-07-09
Payer: MEDICAID

## 2019-07-09 VITALS
HEART RATE: 66 BPM | BODY MASS INDEX: 34.29 KG/M2 | HEIGHT: 66 IN | SYSTOLIC BLOOD PRESSURE: 152 MMHG | DIASTOLIC BLOOD PRESSURE: 74 MMHG | WEIGHT: 213.38 LBS

## 2019-07-09 DIAGNOSIS — I47.10 PAROXYSMAL SUPRAVENTRICULAR TACHYCARDIA: Chronic | ICD-10-CM

## 2019-07-09 DIAGNOSIS — I42.8 NICM (NONISCHEMIC CARDIOMYOPATHY): Chronic | ICD-10-CM

## 2019-07-09 DIAGNOSIS — G47.33 OSA (OBSTRUCTIVE SLEEP APNEA): Chronic | ICD-10-CM

## 2019-07-09 DIAGNOSIS — Z95.810 ICD (IMPLANTABLE CARDIOVERTER-DEFIBRILLATOR) IN PLACE: Primary | Chronic | ICD-10-CM

## 2019-07-09 DIAGNOSIS — I10 ESSENTIAL HYPERTENSION: Chronic | ICD-10-CM

## 2019-07-09 DIAGNOSIS — I48.0 PAROXYSMAL ATRIAL FIBRILLATION: Chronic | ICD-10-CM

## 2019-07-09 DIAGNOSIS — Z95.810 CARDIAC DEFIBRILLATOR IN PLACE: ICD-10-CM

## 2019-07-09 PROCEDURE — 93010 RHYTHM STRIP: ICD-10-PCS | Mod: S$PBB,NTX,, | Performed by: INTERNAL MEDICINE

## 2019-07-09 PROCEDURE — 99214 PR OFFICE/OUTPT VISIT, EST, LEVL IV, 30-39 MIN: ICD-10-PCS | Mod: S$PBB,NTX,, | Performed by: NURSE PRACTITIONER

## 2019-07-09 PROCEDURE — 99999 PR PBB SHADOW E&M-EST. PATIENT-LVL III: CPT | Mod: PBBFAC,TXP,, | Performed by: NURSE PRACTITIONER

## 2019-07-09 PROCEDURE — 99213 OFFICE O/P EST LOW 20 MIN: CPT | Mod: PBBFAC,TXP | Performed by: NURSE PRACTITIONER

## 2019-07-09 PROCEDURE — 99214 OFFICE O/P EST MOD 30 MIN: CPT | Mod: S$PBB,NTX,, | Performed by: NURSE PRACTITIONER

## 2019-07-09 PROCEDURE — 99999 PR PBB SHADOW E&M-EST. PATIENT-LVL III: ICD-10-PCS | Mod: PBBFAC,TXP,, | Performed by: NURSE PRACTITIONER

## 2019-07-09 PROCEDURE — 93005 ELECTROCARDIOGRAM TRACING: CPT | Mod: PBBFAC,NTX | Performed by: INTERNAL MEDICINE

## 2019-07-09 PROCEDURE — 93010 ELECTROCARDIOGRAM REPORT: CPT | Mod: S$PBB,NTX,, | Performed by: INTERNAL MEDICINE

## 2019-07-09 RX ORDER — ERGOCALCIFEROL 1.25 MG/1
CAPSULE ORAL
COMMUNITY
End: 2021-06-24

## 2019-07-11 ENCOUNTER — OFFICE VISIT (OUTPATIENT)
Dept: NEUROLOGY | Facility: HOSPITAL | Age: 54
End: 2019-07-11
Attending: SURGERY
Payer: MEDICAID

## 2019-07-11 VITALS
HEART RATE: 66 BPM | BODY MASS INDEX: 34.14 KG/M2 | HEIGHT: 66 IN | WEIGHT: 212.44 LBS | TEMPERATURE: 98 F | DIASTOLIC BLOOD PRESSURE: 74 MMHG | SYSTOLIC BLOOD PRESSURE: 112 MMHG | RESPIRATION RATE: 20 BRPM

## 2019-07-11 DIAGNOSIS — E27.8 ADRENAL MASS: Primary | ICD-10-CM

## 2019-07-11 DIAGNOSIS — E27.8 ADRENAL INCIDENTALOMA: ICD-10-CM

## 2019-07-11 PROCEDURE — 99213 OFFICE O/P EST LOW 20 MIN: CPT | Mod: TXP | Performed by: SURGERY

## 2019-07-11 RX ORDER — FLUTICASONE PROPIONATE AND SALMETEROL 100; 50 UG/1; UG/1
1 POWDER RESPIRATORY (INHALATION) 2 TIMES DAILY
Status: ON HOLD | COMMUNITY
End: 2021-03-15

## 2019-07-11 NOTE — PATIENT INSTRUCTIONS
Labs due, given to pt. Today, [id number].    1 year follow up on Thursday, July 9, 2020 at 11am    CT scan scheduled on Monday, July 6, 2020 at 845am.

## 2019-07-12 ENCOUNTER — TELEPHONE (OUTPATIENT)
Dept: FAMILY MEDICINE | Facility: HOSPITAL | Age: 54
End: 2019-07-12

## 2019-07-12 NOTE — TELEPHONE ENCOUNTER
----- Message from Micaela Salvador sent at 7/9/2019  2:50 PM CDT -----  Pt is calling in regards to her shower chair that was suppose to be sent over 2 months ago. She has fallen 2 times since and really needs the order to be sent. Please call pt back she is very upset

## 2019-07-18 NOTE — ED NOTES
Another azithromycin retrieved from pharmacy because prior bag would not activate.    Patient seen July 18, 2019 at 10:45am.  Elicited history that questioned Focal status epilepticus versus post-ictal state vs non-epileptic events.  Will record with limited medication overnight to assist with formal diagnosis.    Attempt to retrieve records from [place] and further history from family/partner.

## 2019-08-03 RX ORDER — SACUBITRIL AND VALSARTAN 49; 51 MG/1; MG/1
TABLET, FILM COATED ORAL
Qty: 60 TABLET | Refills: 0 | Status: SHIPPED | OUTPATIENT
Start: 2019-08-03 | End: 2019-08-29 | Stop reason: SDUPTHER

## 2019-08-07 ENCOUNTER — OFFICE VISIT (OUTPATIENT)
Dept: TRANSPLANT | Facility: CLINIC | Age: 54
End: 2019-08-07
Payer: MEDICAID

## 2019-08-07 ENCOUNTER — LAB VISIT (OUTPATIENT)
Dept: LAB | Facility: HOSPITAL | Age: 54
End: 2019-08-07
Attending: INTERNAL MEDICINE
Payer: MEDICAID

## 2019-08-07 VITALS
HEIGHT: 66 IN | WEIGHT: 215.19 LBS | DIASTOLIC BLOOD PRESSURE: 79 MMHG | BODY MASS INDEX: 34.58 KG/M2 | HEART RATE: 67 BPM | SYSTOLIC BLOOD PRESSURE: 134 MMHG

## 2019-08-07 DIAGNOSIS — F41.9 ANXIETY AND DEPRESSION: Primary | ICD-10-CM

## 2019-08-07 DIAGNOSIS — M51.36 LUMBAR DEGENERATIVE DISC DISEASE: ICD-10-CM

## 2019-08-07 DIAGNOSIS — I10 ESSENTIAL HYPERTENSION: Chronic | ICD-10-CM

## 2019-08-07 DIAGNOSIS — I51.7 LEFT VENTRICULAR HYPERTROPHY: Chronic | ICD-10-CM

## 2019-08-07 DIAGNOSIS — Z95.810 ICD (IMPLANTABLE CARDIOVERTER-DEFIBRILLATOR) IN PLACE: Chronic | ICD-10-CM

## 2019-08-07 DIAGNOSIS — N18.30 CHRONIC KIDNEY DISEASE, STAGE III (MODERATE): Chronic | ICD-10-CM

## 2019-08-07 DIAGNOSIS — I50.42 CHRONIC COMBINED SYSTOLIC AND DIASTOLIC HEART FAILURE: ICD-10-CM

## 2019-08-07 DIAGNOSIS — G47.33 OSA (OBSTRUCTIVE SLEEP APNEA): Chronic | ICD-10-CM

## 2019-08-07 DIAGNOSIS — I42.8 NICM (NONISCHEMIC CARDIOMYOPATHY): Chronic | ICD-10-CM

## 2019-08-07 DIAGNOSIS — F32.A ANXIETY AND DEPRESSION: Primary | ICD-10-CM

## 2019-08-07 DIAGNOSIS — I47.10 PAROXYSMAL SUPRAVENTRICULAR TACHYCARDIA: Chronic | ICD-10-CM

## 2019-08-07 DIAGNOSIS — I48.0 PAROXYSMAL ATRIAL FIBRILLATION: Chronic | ICD-10-CM

## 2019-08-07 LAB
ALBUMIN SERPL BCP-MCNC: 2.9 G/DL (ref 3.5–5.2)
ALP SERPL-CCNC: 68 U/L (ref 55–135)
ALT SERPL W/O P-5'-P-CCNC: 12 U/L (ref 10–44)
ANION GAP SERPL CALC-SCNC: 8 MMOL/L (ref 8–16)
AST SERPL-CCNC: 14 U/L (ref 10–40)
BILIRUB SERPL-MCNC: 1.4 MG/DL (ref 0.1–1)
BNP SERPL-MCNC: 219 PG/ML (ref 0–99)
BUN SERPL-MCNC: 24 MG/DL (ref 6–20)
CALCIUM SERPL-MCNC: 8.9 MG/DL (ref 8.7–10.5)
CHLORIDE SERPL-SCNC: 107 MMOL/L (ref 95–110)
CO2 SERPL-SCNC: 28 MMOL/L (ref 23–29)
CREAT SERPL-MCNC: 1.2 MG/DL (ref 0.5–1.4)
EST. GFR  (AFRICAN AMERICAN): 59.6 ML/MIN/1.73 M^2
EST. GFR  (NON AFRICAN AMERICAN): 51.7 ML/MIN/1.73 M^2
GLUCOSE SERPL-MCNC: 188 MG/DL (ref 70–110)
POTASSIUM SERPL-SCNC: 4 MMOL/L (ref 3.5–5.1)
PROT SERPL-MCNC: 6.5 G/DL (ref 6–8.4)
SODIUM SERPL-SCNC: 143 MMOL/L (ref 136–145)

## 2019-08-07 PROCEDURE — 99215 OFFICE O/P EST HI 40 MIN: CPT | Mod: S$PBB,NTX,, | Performed by: INTERNAL MEDICINE

## 2019-08-07 PROCEDURE — 99999 PR PBB SHADOW E&M-EST. PATIENT-LVL IV: ICD-10-PCS | Mod: PBBFAC,TXP,, | Performed by: INTERNAL MEDICINE

## 2019-08-07 PROCEDURE — 99215 PR OFFICE/OUTPT VISIT, EST, LEVL V, 40-54 MIN: ICD-10-PCS | Mod: S$PBB,NTX,, | Performed by: INTERNAL MEDICINE

## 2019-08-07 PROCEDURE — 99999 PR PBB SHADOW E&M-EST. PATIENT-LVL IV: CPT | Mod: PBBFAC,TXP,, | Performed by: INTERNAL MEDICINE

## 2019-08-07 PROCEDURE — 36415 COLL VENOUS BLD VENIPUNCTURE: CPT | Mod: TXP

## 2019-08-07 PROCEDURE — 83880 ASSAY OF NATRIURETIC PEPTIDE: CPT | Mod: NTX

## 2019-08-07 PROCEDURE — 80053 COMPREHEN METABOLIC PANEL: CPT | Mod: NTX

## 2019-08-07 PROCEDURE — 99214 OFFICE O/P EST MOD 30 MIN: CPT | Mod: PBBFAC,TXP | Performed by: INTERNAL MEDICINE

## 2019-08-07 NOTE — PROGRESS NOTES
Subjective:     HPI:  Ms. Hawley is a 53 y.o. year old Black or  female who has presents for one month follow up.Seen by Dr Lorenzo to be evaluated as a potential heart transplant recipient (referred by Junior / Dana)   At her initial visit in July 2018, she was told to complete her pheochromocytoma. She was seen by [facility] who feel she is unlikely to have pheochromocytoma  They recommend that we can pursue heart transplant workup.    She is here for follow up after hospitalization. Doing better now mild SOB FC II NYHA.    Since last visit ablation of SVT. Major issue is pain in both legs and back She will see neurology  Patient was placed on I.V Lasix and this improved SOB and currently home lasix increased to 80 mg BID. Small dose aldactone added too. Patient asked to take extra dose lasix for gain of weight as advised before. She did not take extra dose for past 2 months as thought weight gain was sec to change in diet habits and her gaining weight and not fluid. Also asked to be compliant with low salt diet(was not for past 2 months as she was with family or went out to eat). A flutter on admit sec to acute on chronic Combined HF. Was on Amiodarone drip for only few hours and then had sinus bradycardia. Currently in NSR and rate controlled with home dose Coreg. Cont Eliquis. She is now being d/beronica home in a stable condition and cont to f/u with Cardiology as out patient.      PREVIOUS HISTORY    Patient most recently admitted at [facility] with ADHF, BNP significantly elevated at 2573, had to be diuresed, additionally had episode of VT x 1, and chest pain overnight. Had elevated troponin as well at around .6, felt to be due to ADHF and tachycardia. Bili was elevated as well, bili all the way up to 5.6. Last visit was sent to hepatology and they plan to do US  elastography but felt ok to proceed with LVAD/OHT workup. Last visit she was to increase isordil to 2 tablets TID but  did not do this. Today, feels less SOB. Has some mild musculoskeletal pains she feels is related to her AICD.      TTE 08/31/18:    1 - Severely depressed left ventricular systolic function (EF 20-25%).     2 - Impaired LV relaxation, elevated LAP (grade 2 diastolic dysfunction).     3 - Pulmonary hypertension. The estimated PA systolic pressure is 43 mmHg.     4 - Mild mitral regurgitation.     5 - Increased central venous pressure.     6 - Moderate pulmonic regurgitation.     7- LVH     Six minute walk 06/21/2018---------Distance: 292.61 meters (960 feet)     O2 Sat % Supplemental Oxygen Heart Rate Blood Pressure Haseeb   Scale   Pre-exercise  (Resting) 98 % Room Air 70 bpm 173/97 mmHg 0.5   During Exercise 95 % Room Air 80 bpm 143/100 mmHg 5-6   Post-exercise  (Recovery) 98 % Room Air  68 bpm 141/93 mmHg       FINAL PATHOLOGIC DIAGNOSIS  Liver, random, biopsy:  - Minimal steatosis, predominantly macrovesicular, 1%.  - Minimal nonspecific portal inflammation.  - No significant fibrosis seen.  - PAS-D and copper pending, results will be issued in an addendum.  - See comment.  COMMENT: The biopsy is adequate for evaluation. The patient's history of elevated total bilirubin is noted and  select information from the electronic medical record is reviewed. The biopsy is relatively unremarkable with  minimal steatosis and nonspecific portal inflammation. There is no definitive steatohepatitis. There is no  cholestasis or ductular reaction seen. The bile ducts are unremarkable. There is no significant plasma cell  population seen. There are no granulomas seen. There is no increased iron on iron stain. There is no increased  fibrosis on trichrome stain. There is no clear etiology for elevated bilirubin seen on this biopsy. Appropriate positive  controls are examined.  Diagnosed      Past Medical History:   Diagnosis Date    Anemia     Anticoagulant long-term use     Arthritis     Atrial fibrillation     Congestive heart  failure     COPD (chronic obstructive pulmonary disease)     Deep vein thrombosis     elevated bilirubin d/t Gilbert's syndrome     confirmed by [facility] genetic testing, evaluated by hepatology    Encounter for blood transfusion     GERD (gastroesophageal reflux disease)     Hypertension     Pheochromocytoma, malignant     Right kidney mass     Sleep apnea     Thyroid disease      Past Surgical History:   Procedure Laterality Date    Ablation, SVT, Accessory Pathway N/A 2019    Performed by Bob Pretty MD at [facility] EP LAB    APPENDECTOMY      BIOPSY, LIVER, TRANSJUGULAR APPROACH N/A 10/24/2018    Performed by [facility] Diagnostic Provider at [facility] OR [facility] FLR    CARDIAC DEFIBRILLATOR PLACEMENT Left 2016    EYE SURGERY      due to running tears    FRACTURE SURGERY Left     hand 5th digit    HEART CATH-RIGHT Right 2016    Performed by Rakesh Garcia MD at [facility] CATH LAB    HYSTERECTOMY      INSERTION-ICD-SINGLE N/A 2016    Performed by Bob Pretty MD at [facility] CATH LAB    KNEE SURGERY Left 2016    hematoma    LIVER BIOPSY  10/24/2018    Minimal steatosis, predominantly macrovesicular, 1%, Minimal nonspecific portal inflammation, no fibrosis. No findings on biopsy to explain elevated bilirubin levels. Could be d/t Gilbert's =?- hemolysis    PROCEDURE PROLAPSE HEMORRHOID (PPH) N/A 3/7/2017    Performed by GEORGE Rothman MD at [facility] OR    Right heart cath Right 2018    Performed by Benson Cortez MD at [facility] CATH LAB     OB History        2    Para   2    Term   2            AB        Living           SAB        TAB        Ectopic        Multiple        Live Births                   Review of Systems   Constitution: Negative for chills, decreased appetite, diaphoresis, fever and weight gain.   Cardiovascular: Positive for dyspnea on exertion. Negative for chest pain, claudication, cyanosis and paroxysmal nocturnal dyspnea.   Respiratory: Negative for cough,  "hemoptysis, shortness of breath, sleep disturbances due to breathing and snoring.    Skin: Negative for nail changes.   Gastrointestinal: Negative for bloating, nausea and vomiting.   Neurological: Negative for weakness.   Psychiatric/Behavioral: Negative for depression.       Objective:   Blood pressure 134/79, pulse 67, height 5' 6" (1.676 m), weight 97.6 kg (215 lb 2.7 oz).body mass index is 34.73 kg/m².  Physical Exam   Constitutional: She appears well-developed.   HENT:   Head: Normocephalic.   Eyes: Pupils are equal, round, and reactive to light.   Neck: Normal range of motion. No JVD present.   Cardiovascular: Normal rate. Exam reveals no friction rub.   No murmur heard.  Pulmonary/Chest: Effort normal. No respiratory distress.   Abdominal: Soft. She exhibits no distension. There is no tenderness.   Musculoskeletal: Normal range of motion. She exhibits no edema.   Neurological: She is alert. No cranial nerve deficit.   Skin: Skin is warm. She is not diaphoretic. No erythema.       Labs:    Chemistry        Component Value Date/Time     08/07/2019 0737    K 4.0 08/07/2019 0737     08/07/2019 0737    CO2 28 08/07/2019 0737    BUN 24 (H) 08/07/2019 0737    CREATININE 1.2 08/07/2019 0737     (H) 08/07/2019 0737        Component Value Date/Time    CALCIUM 8.9 08/07/2019 0737    ALKPHOS 68 08/07/2019 0737    AST 14 08/07/2019 0737    ALT 12 08/07/2019 0737    BILITOT 1.4 (H) 08/07/2019 0737    ESTGFRAFRICA 59.6 (A) 08/07/2019 0737    EGFRNONAA 51.7 (A) 08/07/2019 0737          Magnesium   Date Value Ref Range Status   01/28/2019 1.6 1.6 - 2.6 mg/dL Final     Lab Results   Component Value Date    WBC 4.41 06/21/2019    HGB 9.2 (L) 06/21/2019    HCT 27.9 (L) 06/21/2019     06/21/2019     Lab Results   Component Value Date    INR 1.1 06/21/2019    INR 1.2 05/16/2019    INR 1.3 (H) 04/09/2019     BNP   Date Value Ref Range Status   08/07/2019 219 (H) 0 - 99 pg/mL Final     Comment:     Values " of less than 100 pg/ml are consistent with non-CHF populations.   02/14/2019 301 (H) 0 - 99 pg/mL Final     Comment:     Values of less than 100 pg/ml are consistent with non-CHF populations.   10/21/2018 924 (H) 0 - 99 pg/mL Final     Comment:     Values of less than 100 pg/ml are consistent with non-CHF populations.     LD   Date Value Ref Range Status   06/11/2018 308 (H) 110 - 260 U/L Final     Comment:     Results are increased in hemolyzed samples.   08/23/2016 280 (H) 110 - 260 U/L Final     Comment:     Results are increased in hemolyzed samples.     No results found for this or any previous visit.  No results found for this or any previous visit.    Assessment:      1. Anxiety and depression    2. Lumbar degenerative disc disease    3. Chronic combined systolic and diastolic heart failure    4. Essential hypertension    5. ICD (implantable cardioverter-defibrillator) in place    6. Left ventricular hypertrophy    7. NICM (nonischemic cardiomyopathy)    8. Paroxysmal atrial fibrillation    9. Paroxysmal supraventricular tachycardia    10. Chronic kidney disease, stage III (moderate)    11. MELISSA (obstructive sleep apnea)        Plan:   HFrEF FC II  NYHA  Stable On entresto 49/51    Is/p SVT ablation    RTC in 3 months.with 2 D echo       Patient is now NYHA II  Recommend 2 gram sodium restriction and 1500cc fluid restriction.  Encourage physical activity with graded exercise program.  Requested patient to weigh themselves daily, and to notify us if their weight increases by more than 3 lbs in 1 day or 5 lbs in 1 week.     Listed for transplant: No

## 2019-08-07 NOTE — LETTER
August 7, 2019        Andrea Pacheco  [address]  [address]  [place] [address]  Phone: [phone]  Fax: [phone]             [facility] [address] [place] [address]  Phone: [phone]   Patient: Hafsa Hawley   MR Number: [id number]   YOB: 1965   Date of Visit: 8/7/2019       Dear Dr. Andrea Pacheco    Thank you for referring Hafsa Hawley to me for evaluation. Attached you will find relevant portions of my assessment and plan of care.    If you have questions, please do not hesitate to call me. I look forward to following Hafsa Hawley along with you.    Sincerely,    Armando Garcia MD    Enclosure    If you would like to receive this communication electronically, please contact [email or web address] or [phone] to request [employer] access.    [employer] is a tool which provides read-only access to select patient information with whom you have a relationship. Its easy to use and provides real time access to review your patients record including encounter summaries, notes, results, and demographic information.    If you feel you have received this communication in error or would no longer like to receive these types of communications, please e-mail [email or web address]

## 2019-08-09 ENCOUNTER — TELEPHONE (OUTPATIENT)
Dept: TRANSPLANT | Facility: CLINIC | Age: 54
End: 2019-08-09

## 2019-08-09 NOTE — TELEPHONE ENCOUNTER
Care of patient is being transferred to CHF section from Preht section, per Dr. Garcia.    Dx:  NICM  Reason:  FC II; stability  Pt IS NOT on home inotrope therapy.  Outstanding orders scheduled: Echo 9/17/19, Appt w/Dr. Garcia 11/8/19.

## 2019-08-10 ENCOUNTER — CLINICAL SUPPORT (OUTPATIENT)
Dept: CARDIOLOGY | Facility: HOSPITAL | Age: 54
End: 2019-08-10
Payer: MEDICAID

## 2019-08-10 PROCEDURE — 93296 REM INTERROG EVL PM/IDS: CPT | Performed by: INTERNAL MEDICINE

## 2019-08-10 PROCEDURE — 93295 CARDIAC DEVICE CHECK - REMOTE: ICD-10-PCS | Mod: ,,, | Performed by: INTERNAL MEDICINE

## 2019-08-10 PROCEDURE — 93295 DEV INTERROG REMOTE 1/2/MLT: CPT | Mod: ,,, | Performed by: INTERNAL MEDICINE

## 2019-08-16 ENCOUNTER — TELEPHONE (OUTPATIENT)
Dept: NEUROLOGY | Facility: CLINIC | Age: 54
End: 2019-08-16

## 2019-08-16 NOTE — TELEPHONE ENCOUNTER
I called patient in regards to a referral for a nerve test. She had an appt for 07/01/2019 but she was a no show. I tried to schedule the appt. The computer wasn't giving me a schedule. I gave the number to the patient to call directly.

## 2019-08-20 DIAGNOSIS — R06.00 DYSPNEA: Primary | ICD-10-CM

## 2019-08-29 RX ORDER — SACUBITRIL AND VALSARTAN 49; 51 MG/1; MG/1
TABLET, FILM COATED ORAL
Qty: 60 TABLET | Refills: 0 | Status: SHIPPED | OUTPATIENT
Start: 2019-08-29 | End: 2019-09-27 | Stop reason: SDUPTHER

## 2019-09-17 ENCOUNTER — TELEPHONE (OUTPATIENT)
Dept: ELECTROPHYSIOLOGY | Facility: CLINIC | Age: 54
End: 2019-09-17

## 2019-09-17 ENCOUNTER — HOSPITAL ENCOUNTER (OUTPATIENT)
Dept: CARDIOLOGY | Facility: CLINIC | Age: 54
Discharge: HOME | End: 2019-09-17
Attending: NURSE PRACTITIONER
Payer: MEDICAID

## 2019-09-17 VITALS
DIASTOLIC BLOOD PRESSURE: 92 MMHG | SYSTOLIC BLOOD PRESSURE: 162 MMHG | HEIGHT: 66 IN | WEIGHT: 217 LBS | HEART RATE: 78 BPM | BODY MASS INDEX: 34.87 KG/M2

## 2019-09-17 DIAGNOSIS — I42.8 NICM (NONISCHEMIC CARDIOMYOPATHY): ICD-10-CM

## 2019-09-17 LAB
ASCENDING AORTA: 3.36 CM
AV INDEX (PROSTH): 0.71
AV MEAN GRADIENT: 7 MMHG
AV PEAK GRADIENT: 16 MMHG
AV VALVE AREA: 2.45 CM2
AV VELOCITY RATIO: 0.66
BSA FOR ECHO PROCEDURE: 2.14 M2
CV ECHO LV RWT: 0.52 CM
DOP CALC AO PEAK VEL: 2.02 M/S
DOP CALC AO VTI: 28.96 CM
DOP CALC LVOT AREA: 3.4 CM2
DOP CALC LVOT DIAMETER: 2.09 CM
DOP CALC LVOT PEAK VEL: 1.33 M/S
DOP CALC LVOT STROKE VOLUME: 70.81 CM3
DOP CALCLVOT PEAK VEL VTI: 20.65 CM
E WAVE DECELERATION TIME: 260.83 MSEC
E/A RATIO: 0.45
E/E' RATIO: 12.44 M/S
ECHO LV POSTERIOR WALL: 1.6 CM (ref 0.6–1.1)
FRACTIONAL SHORTENING: 19 % (ref 28–44)
INTERVENTRICULAR SEPTUM: 1.7 CM (ref 0.6–1.1)
IVRT: 0.15 MSEC
LA MAJOR: 5.87 CM
LA MINOR: 5.99 CM
LA WIDTH: 3.89 CM
LEFT ATRIUM SIZE: 5.92 CM
LEFT ATRIUM VOLUME INDEX: 56.1 ML/M2
LEFT ATRIUM VOLUME: 116.06 CM3
LEFT INTERNAL DIMENSION IN SYSTOLE: 4.96 CM (ref 2.1–4)
LEFT VENTRICLE DIASTOLIC VOLUME INDEX: 92.12 ML/M2
LEFT VENTRICLE DIASTOLIC VOLUME: 190.76 ML
LEFT VENTRICLE MASS INDEX: 246 G/M2
LEFT VENTRICLE SYSTOLIC VOLUME INDEX: 56 ML/M2
LEFT VENTRICLE SYSTOLIC VOLUME: 115.97 ML
LEFT VENTRICULAR INTERNAL DIMENSION IN DIASTOLE: 6.15 CM (ref 3.5–6)
LEFT VENTRICULAR MASS: 509.2 G
LV LATERAL E/E' RATIO: 14 M/S
LV SEPTAL E/E' RATIO: 11.2 M/S
MV PEAK A VEL: 1.24 M/S
MV PEAK E VEL: 0.56 M/S
PISA TR MAX VEL: 2.95 M/S
PULM VEIN S/D RATIO: 2.11
PV PEAK D VEL: 0.36 M/S
PV PEAK S VEL: 0.76 M/S
RA MAJOR: 5.5 CM
RA PRESSURE: 8 MMHG
RA WIDTH: 3.82 CM
RIGHT VENTRICULAR END-DIASTOLIC DIMENSION: 3.54 CM
RV TISSUE DOPPLER FREE WALL SYSTOLIC VELOCITY 1 (APICAL 4 CHAMBER VIEW): 16.83 CM/S
SINUS: 2.94 CM
STJ: 3.14 CM
TDI LATERAL: 0.04 M/S
TDI SEPTAL: 0.05 M/S
TDI: 0.05 M/S
TR MAX PG: 35 MMHG
TRICUSPID ANNULAR PLANE SYSTOLIC EXCURSION: 2.26 CM
TV REST PULMONARY ARTERY PRESSURE: 43 MMHG

## 2019-09-17 PROCEDURE — 93306 TTE W/DOPPLER COMPLETE: CPT | Mod: 26,,, | Performed by: INTERNAL MEDICINE

## 2019-09-17 PROCEDURE — 93306 TRANSTHORACIC ECHO (TTE) COMPLETE (CUPID ONLY): ICD-10-PCS | Mod: 26,,, | Performed by: INTERNAL MEDICINE

## 2019-09-17 PROCEDURE — 93306 TTE W/DOPPLER COMPLETE: CPT

## 2019-09-18 ENCOUNTER — TELEPHONE (OUTPATIENT)
Dept: ELECTROPHYSIOLOGY | Facility: CLINIC | Age: 54
End: 2019-09-18

## 2019-09-18 NOTE — TELEPHONE ENCOUNTER
Spoke with Ms. Hawley regarding her Echo results. Notified her that her heart function has improved. She verbalized understanding and appreciates the call.

## 2019-09-18 NOTE — TELEPHONE ENCOUNTER
Patient concerned that her appt with Dr. Garcia on 9/24/19 was cancelled. She says that she never spoke with anyone and was never notified. She would like her original appt date/time.

## 2019-09-27 RX ORDER — SACUBITRIL AND VALSARTAN 49; 51 MG/1; MG/1
TABLET, FILM COATED ORAL
Qty: 60 TABLET | Refills: 0 | Status: SHIPPED | OUTPATIENT
Start: 2019-09-27 | End: 2019-10-27 | Stop reason: SDUPTHER

## 2019-09-30 RX ORDER — ISOSORBIDE DINITRATE 20 MG/1
TABLET ORAL
Qty: 180 TABLET | Refills: 11 | Status: SHIPPED | OUTPATIENT
Start: 2019-09-30 | End: 2019-11-08

## 2019-10-28 RX ORDER — SACUBITRIL AND VALSARTAN 49; 51 MG/1; MG/1
TABLET, FILM COATED ORAL
Qty: 60 TABLET | Refills: 0 | Status: SHIPPED | OUTPATIENT
Start: 2019-10-28 | End: 2019-11-08 | Stop reason: DRUGHIGH

## 2019-11-05 NOTE — PROGRESS NOTES
Requested Prescriptions   Pending Prescriptions Disp Refills     zolpidem (AMBIEN) 10 MG tablet [Pharmacy Med Name: ZOLPIDEM TARTRATE 10 MG TABLET] 30 tablet      Sig: TAKE 1 TABLET BY MOUTH AT BEDTIME AS NEEDED for SLEEP       There is no refill protocol information for this order              Last Written Prescription Date:  10/8/2019  Last Fill Quantity: 30,   # refills: 0  Last Office Visit: 7/17/2019 with Rm   Future Office visit:       Routing refill request to provider for review/approval because:  Drug not on the [facility], [facility] or [facility] refill protocol or controlled substance     Subjective:       Patient ID: Hafsa Hawley is a 53 y.o. female.    Chief Complaint: Heart failure    HPI   53 F with HTN, COPD,NICM s/p ICD , AF( on eliquis) , Gilbert's syndrome, pulm htn, PSVT, MELISSA, GERD presenting to clinic for hospital follow up. Pt was admitted to [facility] for acute decompensated heart failure and was noted to be in SVT with a flutter requiring amiodarone gtt. Pt was diuresed and discharged on 1/29.  Today, patient states that she is feeling better. Has improvement to breathing. No edema noted. She is compliant with medication regimen and was seen earlier in the day by cardiology. She is been evaluated for heart transplant.   She does c/o diffuse body aches and pain. She states pain is worse at night. She has low back pain with radiation down b/l legs. She has not been to pt/ot and is not interested.     Review of Systems   Constitutional: Negative for chills and fever.   Respiratory: Negative for chest tightness, shortness of breath and wheezing.    Cardiovascular: Negative for chest pain and leg swelling.   Gastrointestinal: Negative for abdominal pain, constipation, diarrhea, nausea and vomiting.   Musculoskeletal: Positive for arthralgias and back pain. Negative for myalgias.   Skin: Negative for rash.   Neurological: Negative for dizziness, weakness and headaches.   Psychiatric/Behavioral: Negative for behavioral problems. The patient is not nervous/anxious.        Objective:      Vitals:    02/14/19 1322   BP: 135/66   Pulse: 67     Physical Exam   Constitutional: She is oriented to person, place, and time. She appears well-developed and well-nourished.   HENT:   Head: Normocephalic and atraumatic.   Eyes: Conjunctivae and EOM are normal. Pupils are equal, round, and reactive to light.   Neck: Normal range of motion. Neck supple. No JVD present. No thyromegaly present.   Cardiovascular: Normal rate, regular rhythm and normal heart sounds. Exam reveals no gallop and no  friction rub.   No murmur heard.  Pulmonary/Chest: Effort normal and breath sounds normal. She has no wheezes. She has no rales.   Abdominal: Soft. Bowel sounds are normal. She exhibits no distension. There is no tenderness.   Musculoskeletal: Normal range of motion. She exhibits tenderness (paraspinal muscle tenderness in lumbar area).   + straight leg   Neurological: She is alert and oriented to person, place, and time. She has normal reflexes.   Skin: Skin is warm and dry.   Psychiatric: She has a normal mood and affect. Her behavior is normal.   Nursing note and vitals reviewed.      Assessment:       1. Chronic combined systolic and diastolic heart failure    2. NICM (nonischemic cardiomyopathy)    3. Fibromyalgia affecting multiple sites        Plan:       Chronic combined systolic and diastolic heart failure  NICM (nonischemic cardiomyopathy)  - c/w current management   - follow up with cardiology as scheduled  - stable     Fibromyalgia affecting multiple sites  Low back pain with sciatica   -     amitriptyline (ELAVIL) 25 MG tablet; Take 1 tablet (25 mg total) by mouth nightly as needed for Insomnia.  Dispense: 30 tablet; Refill:   - pt not interested in pt/ot  - exercise/stretching handout provided     RTC 3-6 months or prn    Aster Paz D.O.  [facility] HO-3  02/15/2019

## 2019-11-08 ENCOUNTER — OFFICE VISIT (OUTPATIENT)
Dept: TRANSPLANT | Facility: CLINIC | Age: 54
End: 2019-11-08
Payer: MEDICAID

## 2019-11-08 VITALS
WEIGHT: 220.44 LBS | HEART RATE: 69 BPM | BODY MASS INDEX: 35.43 KG/M2 | HEIGHT: 66 IN | DIASTOLIC BLOOD PRESSURE: 73 MMHG | SYSTOLIC BLOOD PRESSURE: 142 MMHG

## 2019-11-08 DIAGNOSIS — G47.33 OSA (OBSTRUCTIVE SLEEP APNEA): Chronic | ICD-10-CM

## 2019-11-08 DIAGNOSIS — I51.7 LEFT VENTRICULAR HYPERTROPHY: Chronic | ICD-10-CM

## 2019-11-08 DIAGNOSIS — I10 ESSENTIAL HYPERTENSION: Chronic | ICD-10-CM

## 2019-11-08 DIAGNOSIS — M79.642 PAIN IN BOTH HANDS: ICD-10-CM

## 2019-11-08 DIAGNOSIS — N18.30 CHRONIC KIDNEY DISEASE, STAGE III (MODERATE): Chronic | ICD-10-CM

## 2019-11-08 DIAGNOSIS — M79.641 PAIN IN BOTH HANDS: ICD-10-CM

## 2019-11-08 DIAGNOSIS — I50.42 CHRONIC COMBINED SYSTOLIC AND DIASTOLIC HEART FAILURE: ICD-10-CM

## 2019-11-08 DIAGNOSIS — I42.8 NICM (NONISCHEMIC CARDIOMYOPATHY): Chronic | ICD-10-CM

## 2019-11-08 DIAGNOSIS — I47.10 PAROXYSMAL SUPRAVENTRICULAR TACHYCARDIA: Primary | Chronic | ICD-10-CM

## 2019-11-08 DIAGNOSIS — Z95.810 ICD (IMPLANTABLE CARDIOVERTER-DEFIBRILLATOR) IN PLACE: Chronic | ICD-10-CM

## 2019-11-08 PROCEDURE — 99999 PR PBB SHADOW E&M-EST. PATIENT-LVL III: ICD-10-PCS | Mod: PBBFAC,,, | Performed by: INTERNAL MEDICINE

## 2019-11-08 PROCEDURE — 99215 PR OFFICE/OUTPT VISIT, EST, LEVL V, 40-54 MIN: ICD-10-PCS | Mod: S$PBB,,, | Performed by: INTERNAL MEDICINE

## 2019-11-08 PROCEDURE — 99999 PR PBB SHADOW E&M-EST. PATIENT-LVL III: CPT | Mod: PBBFAC,,, | Performed by: INTERNAL MEDICINE

## 2019-11-08 PROCEDURE — 99215 OFFICE O/P EST HI 40 MIN: CPT | Mod: S$PBB,,, | Performed by: INTERNAL MEDICINE

## 2019-11-08 PROCEDURE — 99213 OFFICE O/P EST LOW 20 MIN: CPT | Mod: PBBFAC | Performed by: INTERNAL MEDICINE

## 2019-11-13 ENCOUNTER — CLINICAL SUPPORT (OUTPATIENT)
Dept: CARDIOLOGY | Facility: HOSPITAL | Age: 54
End: 2019-11-13
Payer: MEDICAID

## 2019-11-13 DIAGNOSIS — Z95.810 PRESENCE OF AUTOMATIC (IMPLANTABLE) CARDIAC DEFIBRILLATOR: ICD-10-CM

## 2019-11-13 PROCEDURE — 93296 REM INTERROG EVL PM/IDS: CPT | Performed by: INTERNAL MEDICINE

## 2019-11-13 PROCEDURE — 93295 CARDIAC DEVICE CHECK - REMOTE: ICD-10-PCS | Mod: ,,, | Performed by: INTERNAL MEDICINE

## 2019-11-13 PROCEDURE — 93295 DEV INTERROG REMOTE 1/2/MLT: CPT | Mod: ,,, | Performed by: INTERNAL MEDICINE

## 2019-12-18 NOTE — PROGRESS NOTES
[facility]  History & Physical  Orthopedics    SUBJECTIVE:      (Colbert)    Chief Complaint: left arm pain swelling in elbow, wrist, and hand.     Referring Provider: Ryan Burch*      History of Present Illness:    Patient is a 54 y.o. right hand dominant female who presents today with complaints of knot on her wrist with pain in elbow, wrist, and hand. The patient is on disability.    Onset of symptoms/DOI was 3 months ago.     Symptoms are aggravated by lifting and quick movements. She was carrying her 11 month old grandson the other day and was worried she would drop him due to pain.     Symptoms are alleviated by nothing.    Symptoms consist of pain and swelling.The area of swelling (knot) varies in size and is painful. She feels tightness in her fingers. She also has pain that radiates up her arm to her elbow. She has numbness, tingling, and weakness. When she has the pain, she has difficulty holding things (feels like she will drop a glass).     The patient rates their pain as a 10/10.    Attempted treatment(s) and/or interventions include anti-inflammatory medications. No improvement with OTC motrin/tylenol. No PT, injections, or surgery on her hand.      The patient denies any fevers, chills, N/V, D/C and presents for evaluation. She has a cardiac defibrillator and is on ELIQUIS.        Past Medical History:   Diagnosis Date    Anemia     Anticoagulant long-term use     Arthritis     Atrial fibrillation     Congestive heart failure     COPD (chronic obstructive pulmonary disease)     Deep vein thrombosis     elevated bilirubin d/t Gilbert's syndrome     confirmed by [place] genetic testing, evaluated by hepatology    Encounter for blood transfusion     GERD (gastroesophageal reflux disease)     Hypertension     Pheochromocytoma, malignant     Right kidney mass     Sleep apnea     Thyroid disease      Past Surgical History:   Procedure Laterality Date     "APPENDECTOMY      CARDIAC DEFIBRILLATOR PLACEMENT Left 2016    EYE SURGERY      due to running tears    FRACTURE SURGERY Left     hand 5th digit    HYSTERECTOMY      KNEE SURGERY Left 2016    hematoma    LIVER BIOPSY  10/24/2018    Minimal steatosis, predominantly macrovesicular, 1%, Minimal nonspecific portal inflammation, no fibrosis. No findings on biopsy to explain elevated bilirubin levels. Could be d/t Gilbert's =?- hemolysis    TRANSJUGULAR BIOPSY OF LIVER N/A 10/24/2018    Procedure: BIOPSY, LIVER, TRANSJUGULAR APPROACH;  [facility];  Location: [facility] OR [address] [place] [address];  Service: Radiology;  Laterality: N/A;     Review of patient's allergies indicates:   Allergen Reactions    Penicillins Hives    Iodinated contrast media Nausea And Vomiting    Percocet [oxycodone-acetaminophen] Other (See Comments)     Nausea, Dizziness, Anxiety.  "I don't like how it makes me feel."   Given Hydromorphone 0.5mg IVP  Without problems.    Diovan hct [valsartan-hydrochlorothiazide] Other (See Comments)     Causes coughing    Irinotecan      Pt has homozygosity for the TA7 promoter variant that places this individual at significantly increased risk for   severe neutropenia (grade 4) when treated with the standard dose of irinotecan (risk approximately 50%).   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 Metabolism of other drugs not listed here may also be impacted by UGT1A1 enzyme activity.       Tramadol Nausea And Vomiting     Social History     Social History Narrative    On disability since      Family History   Problem Relation Age of Onset    Cancer Mother         pancreatic CA early 50's    Heart disease Father          MI in late 50's    Hypertension Father     Heart attack Father     Heart disease Sister     Heart disease Brother     Cirrhosis Brother       "   alcoholic    Heart disease Sister     Heart disease Brother     Hypertension Brother     Diabetes Brother          Current Outpatient Medications:     albuterol-ipratropium 2.5mg-0.5mg/3mL (DUO-NEB) 0.5 mg-3 mg(2.5 mg base)/3 mL nebulizer solution, Take 1 mL by nebulization every 6 (six) hours as needed for Wheezing or Shortness of Breath., Disp: 1 Box, Rfl: 2    ALPRAZolam (XANAX) 1 MG tablet, Take 1 mg by mouth nightly as needed for Anxiety., Disp: , Rfl:     apixaban (ELIQUIS) 5 mg Tab, Take 1 tablet (5 mg total) by mouth 2 (two) times daily., Disp: 60 tablet, Rfl: 0    atorvastatin (LIPITOR) 40 MG tablet, Take 1 tablet (40 mg total) by mouth once daily., Disp: 90 tablet, Rfl: 3    b complex vitamins tablet, Take 1 tablet by mouth once daily., Disp: , Rfl:     budesonide-formoterol 80-4.5 mcg (SYMBICORT) 80-4.5 mcg/actuation HFAA, Inhale 2 puffs twice a day by inhalation route., Disp: , Rfl:     carvedilol (COREG) 25 MG tablet, Take 1 tablet by mouth 2 (two) times daily., Disp: , Rfl:     cyanocobalamin, vitamin B-12, 50 mcg tablet, Take 5 mcg by mouth once daily., Disp: , Rfl:     ergocalciferol (ERGOCALCIFEROL) 50,000 unit Cap, ergocalciferol (vitamin D2) 50,000 unit capsule  Take 1 capsule every week by oral route., Disp: , Rfl:     fluticasone propionate (FLONASE) 50 mcg/actuation nasal spray, Spray 2 sprays every day by intranasal route., Disp: , Rfl:     fluticasone-salmeterol diskus inhaler 100-50 mcg, Inhale 1 puff into the lungs 2 (two) times daily. Controller, Disp: , Rfl:     furosemide (LASIX) 80 MG tablet, Take 1 tablet (80 mg total) by mouth 2 (two) times daily. TAKE EXTRA DOSE FOR GAIN OF 2 OR MORE POUND WEIGHT IN 1 DAY OR 5 POUNDS IN 1 WEEK., Disp: 60 tablet, Rfl: 1    isosorbide dinitrate (ISORDIL) 20 MG tablet, Take 1 tablet (20 mg total) by mouth 2 (two) times daily. Please check that this is what you are already taking at home., Disp: , Rfl:     montelukast (SINGULAIR) 10  mg tablet, Take 1 tablet every day by oral route for 30 days., Disp: , Rfl:     NITROSTAT 0.4 mg SL tablet, Take 2.5 tablets (1 mg total) by mouth every 5 (five) minutes as needed for Chest pain. No more than 3 tablets in 15 minutes., Disp: , Rfl:     pantoprazole (PROTONIX) 40 MG tablet, Take 1 tablet by mouth once daily., Disp: , Rfl: 5    potassium chloride (KLOR-CON) 10 MEQ TbSR, Take 1 tablet (10 mEq total) by mouth once daily., Disp: , Rfl:     rOPINIRole 6 mg Tb24, Take 0.5 mg by mouth once daily. , Disp: , Rfl:     sacubitril-valsartan (ENTRESTO)  mg per tablet, Take 1 tablet by mouth 2 (two) times daily., Disp: , Rfl:     silver sulfADIAZINE 1% (SILVADENE) 1 % cream, Silvadene 1 % topical cream  APPLY A 1/16 INCH (1.5 MM) THICK LAYER TO ENTIRE BURN AREA BY TOPICALROUTE 2 TIMES PER DAY for 10 days, Disp: , Rfl:     spironolactone (ALDACTONE) 25 MG tablet, Take 1 tablet (25 mg total) by mouth once daily., Disp: 30 tablet, Rfl: 1    VENTOLIN HFA 90 mcg/actuation inhaler, Inhale 2 puffs into the lungs 2 (two) times daily as needed. , Disp: , Rfl: 11    walker Misc, 1 Device by Misc.(Non-Drug; Combo Route) route as needed., Disp: 1 each, Rfl: 0  No current facility-administered medications for this visit.     Facility-Administered Medications Ordered in Other Visits:     0.9%  NaCl infusion, , Intravenous, Continuous, Corinna Hayes NP    vancomycin in dextrose 5 % 1 gram/250 mL IVPB 1,000 mg, 1,000 mg, Intravenous, On Call Procedure, Corinna Hayes NP, 1,000 mg at 05/23/19 0736      Review of Systems:  Constitutional: no fever or chills. Positive for generalized weakness/fatigue, weight gain.   Eyes: Positive for poor vision, blurry vision, and double vision.   ENT: no nasal congestion or sore throat  Respiratory: Positive for SOB, cough, and wheezing.   Cardiovascular: no chest pain. Positive for irregular heartbeat, and palpitations.  Gastrointestinal: no nausea or vomiting,  "tolerating diet  Musculoskeletal: Positive for swelling, loss of control of arms/legs, abnormal arm/leg sensations, neck pain, back pain, numbness, tingling, muscle spasms, headaches, dizziness.       OBJECTIVE:      Vital Signs (Most Recent):  Vitals:    12/19/19 0835   Height: 5' 6" (1.676 m)     Body mass index is 35.58 kg/m².      Physical Exam:  Constitutional: The patient appears well-developed and well-nourished. No distress.   Head: Normocephalic and atraumatic.   Nose: Nose normal.   Eyes: Conjunctivae and EOM are normal.   Neck: No tracheal deviation present.   Cardiovascular: Normal rate and intact distal pulses.    Pulmonary/Chest: Effort normal. No respiratory distress.   Abdominal: There is no guarding.   Neurological: The patient is alert.   Psychiatric: The patient has a normal mood and affect.     Left Hand/Wrist Examination:    Observation/Inspection:  Swelling  She has small area of swelling dorsal wrist that is not very impressive. She is   Deformity  none  Discoloration  none     Scars   none    Atrophy  none    HAND/WRIST EXAMINATION:  Finkelstein's Test   Neg  Snuff box tenderness   Neg  Hook of Hamate Tenderness  Neg  CMC grind    Neg  Circumduction test   Neg    She has diffuse tenderness about the left wrist and hand.     Neurovascular Exam:  Digits WWP, brisk CR < 3s throughout  NVI motor/LTS to M/R/U nerves, radial pulse 2+  Tinel's Test - Carpal Tunnel  Neg  Tinel's Test - Cubital Tunnel  Neg  Phalen's Test    Neg  Median Nerve Compression Test Neg    ROM hand/wrist/elbow full, painless. She has tenderness over area of lateal epicondyle left elbow. She has pain with resisted extension of the fingers.       XRAY INTERPRETATION:   X-rays of left wrist dated 12/11/19 are personally reviewed and show no fractures or bony abnormality.       ASSESSMENT/PLAN:      54 y.o. yo female with pain in left elbow, wrist, and hand x 3 months. XRs of left wrist look good. She may have small dorsal wrist " ganglion, but it is not impressive on exam today. She likely also has lateral epicondylitis of the elbow which may be contributing to her diffuse pain. Treatment options reviewed with patient along with above left wrist XRs. Following plan discussed with patient.     Plan:   - New prescription for voltaren gel to use prn. Reviewed dosing and side effects.   - Referral to OT for elbow, wrist, and hand pain. This should help localize her diffuse pain complaints. Internal orders to [facility].   - If no improvement with voltaren gel, consider lidocaine patches as they have helped in the past. Not sure they will be covered under her insurance.     She will follow up with me in 2 months for recheck of left elbow, wrist, and hand pain.

## 2019-12-19 ENCOUNTER — OFFICE VISIT (OUTPATIENT)
Dept: ORTHOPEDICS | Facility: CLINIC | Age: 54
End: 2019-12-19
Payer: MEDICAID

## 2019-12-19 VITALS — BODY MASS INDEX: 35.58 KG/M2 | HEIGHT: 66 IN

## 2019-12-19 DIAGNOSIS — M25.532 LEFT WRIST PAIN: Primary | ICD-10-CM

## 2019-12-19 DIAGNOSIS — M67.40 GANGLION CYST: Primary | ICD-10-CM

## 2019-12-19 DIAGNOSIS — M67.40 GANGLION CYST: ICD-10-CM

## 2019-12-19 DIAGNOSIS — M77.12 LEFT LATERAL EPICONDYLITIS: ICD-10-CM

## 2019-12-19 PROCEDURE — 99215 OFFICE O/P EST HI 40 MIN: CPT | Mod: PBBFAC,PN | Performed by: PHYSICIAN ASSISTANT

## 2019-12-19 PROCEDURE — 99999 PR PBB SHADOW E&M-EST. PATIENT-LVL V: CPT | Mod: PBBFAC,,, | Performed by: PHYSICIAN ASSISTANT

## 2019-12-19 PROCEDURE — 99203 OFFICE O/P NEW LOW 30 MIN: CPT | Mod: S$PBB,,, | Performed by: PHYSICIAN ASSISTANT

## 2019-12-19 PROCEDURE — 99203 PR OFFICE/OUTPT VISIT, NEW, LEVL III, 30-44 MIN: ICD-10-PCS | Mod: S$PBB,,, | Performed by: PHYSICIAN ASSISTANT

## 2019-12-19 PROCEDURE — 99999 PR PBB SHADOW E&M-EST. PATIENT-LVL V: ICD-10-PCS | Mod: PBBFAC,,, | Performed by: PHYSICIAN ASSISTANT

## 2019-12-19 RX ORDER — SILVER SULFADIAZINE 10 G/1000G
CREAM TOPICAL
COMMUNITY
Start: 2019-12-04 | End: 2020-09-23

## 2019-12-19 RX ORDER — DICLOFENAC SODIUM 10 MG/G
2 GEL TOPICAL 3 TIMES DAILY
Qty: 5 TUBE | Refills: 2 | Status: SHIPPED | OUTPATIENT
Start: 2019-12-19 | End: 2021-12-28 | Stop reason: SDUPTHER

## 2019-12-19 RX ORDER — FLUTICASONE PROPIONATE 50 MCG
2 SPRAY, SUSPENSION (ML) NASAL DAILY PRN
COMMUNITY
End: 2024-03-26

## 2019-12-19 RX ORDER — MONTELUKAST SODIUM 10 MG/1
TABLET ORAL
COMMUNITY
End: 2023-01-05

## 2019-12-19 RX ORDER — BUDESONIDE AND FORMOTEROL FUMARATE DIHYDRATE 80; 4.5 UG/1; UG/1
AEROSOL RESPIRATORY (INHALATION)
COMMUNITY
End: 2022-02-16

## 2019-12-19 NOTE — LETTER
December 19, 2019      Jamilah Burch MD  [address] [place] [address]           [facility]  [address] , [facility] [address]  [place] [address]  Phone: [phone]  Fax: [phone]          Patient: Hafsa Hawley   MR Number: [id number]   YOB: 1965   Date of Visit: 12/19/2019       Dear Dr. Jamilah Burch:    Thank you for referring Hafsa Hawley to me for evaluation. Attached you will find relevant portions of my assessment and plan of care.    If you have questions, please do not hesitate to call me. I look forward to following Hafsa Hawley along with you.    Sincerely,    Demetria Márquez PA-C    Enclosure  CC:  No Recipients    If you would like to receive this communication electronically, please contact [email or web address] or [phone] to request more information on [facility] access.    For providers and/or their staff who would like to refer a patient to [facility], please contact us through our one-stop-shop provider referral line, [facility], at [phone].    If you feel you have received this communication in error or would no longer like to receive these types of communications, please e-mail [email or web address]

## 2019-12-19 NOTE — PATIENT INSTRUCTIONS
It was nice to meet you today. I'm sorry that you are hurting.     You may have a little ganglion cyst on the left wrist. You also likely have some inflammation in the wrist/hand that is causing your pain. You also have some mild tendonitis in the left elbow.     I sent diclofenac gel to your pharmacy. This should help. If not, let me know and I will see if we can get the lidoderm patches/gel covered.     I sent therapy orders to [facility] here in [place]. They should call you to schedule or you can call [phone].     I think you will get better and be able to hold your grandson with no problems. I will see you back in 2 months, but call me if you need anything.     Happy Holidays!    Demetria   [phone]

## 2019-12-31 ENCOUNTER — TELEPHONE (OUTPATIENT)
Dept: ELECTROPHYSIOLOGY | Facility: CLINIC | Age: 54
End: 2019-12-31

## 2019-12-31 NOTE — TELEPHONE ENCOUNTER
Pt had SVT ablation 5/23/19. Recent   SVT on home monitoring 12/1/19 for 12mins 28 secs, rates 130s-140s.   Tried to call pt but VM is not set up.  Not in [facility].  Sent a message to  regarding SVT episodes.

## 2020-01-16 ENCOUNTER — TELEPHONE (OUTPATIENT)
Dept: ELECTROPHYSIOLOGY | Facility: CLINIC | Age: 55
End: 2020-01-16

## 2020-01-16 DIAGNOSIS — I49.8 OTHER SPECIFIED CARDIAC ARRHYTHMIAS: Primary | ICD-10-CM

## 2020-02-04 ENCOUNTER — OFFICE VISIT (OUTPATIENT)
Dept: ELECTROPHYSIOLOGY | Facility: CLINIC | Age: 55
End: 2020-02-04
Payer: MEDICAID

## 2020-02-04 ENCOUNTER — HOSPITAL ENCOUNTER (OUTPATIENT)
Dept: CARDIOLOGY | Facility: CLINIC | Age: 55
Discharge: HOME | End: 2020-02-04
Payer: MEDICAID

## 2020-02-04 ENCOUNTER — CLINICAL SUPPORT (OUTPATIENT)
Dept: CARDIOLOGY | Facility: HOSPITAL | Age: 55
End: 2020-02-04
Attending: INTERNAL MEDICINE
Payer: MEDICAID

## 2020-02-04 VITALS
HEART RATE: 78 BPM | DIASTOLIC BLOOD PRESSURE: 76 MMHG | WEIGHT: 224.88 LBS | SYSTOLIC BLOOD PRESSURE: 108 MMHG | BODY MASS INDEX: 36.14 KG/M2 | HEIGHT: 66 IN

## 2020-02-04 DIAGNOSIS — I10 ESSENTIAL HYPERTENSION: Chronic | ICD-10-CM

## 2020-02-04 DIAGNOSIS — I48.0 PAROXYSMAL ATRIAL FIBRILLATION: Primary | Chronic | ICD-10-CM

## 2020-02-04 DIAGNOSIS — I42.8 NICM (NONISCHEMIC CARDIOMYOPATHY): Chronic | ICD-10-CM

## 2020-02-04 DIAGNOSIS — I49.8 OTHER SPECIFIED CARDIAC ARRHYTHMIAS: ICD-10-CM

## 2020-02-04 DIAGNOSIS — G47.33 OSA (OBSTRUCTIVE SLEEP APNEA): Chronic | ICD-10-CM

## 2020-02-04 DIAGNOSIS — Z95.810 ICD (IMPLANTABLE CARDIOVERTER-DEFIBRILLATOR) IN PLACE: Chronic | ICD-10-CM

## 2020-02-04 DIAGNOSIS — Z95.810 CARDIAC DEFIBRILLATOR IN PLACE: ICD-10-CM

## 2020-02-04 DIAGNOSIS — I49.8 OTHER SPECIFIED CARDIAC ARRHYTHMIAS: Primary | ICD-10-CM

## 2020-02-04 DIAGNOSIS — I47.10 PAROXYSMAL SUPRAVENTRICULAR TACHYCARDIA: Chronic | ICD-10-CM

## 2020-02-04 PROCEDURE — 93010 RHYTHM STRIP: ICD-10-PCS | Mod: S$PBB,,, | Performed by: INTERNAL MEDICINE

## 2020-02-04 PROCEDURE — 99999 PR PBB SHADOW E&M-EST. PATIENT-LVL III: CPT | Mod: PBBFAC,,, | Performed by: NURSE PRACTITIONER

## 2020-02-04 PROCEDURE — 99214 PR OFFICE/OUTPT VISIT, EST, LEVL IV, 30-39 MIN: ICD-10-PCS | Mod: S$PBB,,, | Performed by: NURSE PRACTITIONER

## 2020-02-04 PROCEDURE — 93005 ELECTROCARDIOGRAM TRACING: CPT | Mod: PBBFAC | Performed by: INTERNAL MEDICINE

## 2020-02-04 PROCEDURE — 99214 OFFICE O/P EST MOD 30 MIN: CPT | Mod: S$PBB,,, | Performed by: NURSE PRACTITIONER

## 2020-02-04 PROCEDURE — 99213 OFFICE O/P EST LOW 20 MIN: CPT | Mod: PBBFAC,25 | Performed by: NURSE PRACTITIONER

## 2020-02-04 PROCEDURE — 99999 PR PBB SHADOW E&M-EST. PATIENT-LVL III: ICD-10-PCS | Mod: PBBFAC,,, | Performed by: NURSE PRACTITIONER

## 2020-02-04 PROCEDURE — 93283 PRGRMG EVAL IMPLANTABLE DFB: CPT

## 2020-02-04 PROCEDURE — 93010 ELECTROCARDIOGRAM REPORT: CPT | Mod: S$PBB,,, | Performed by: INTERNAL MEDICINE

## 2020-02-04 NOTE — PROGRESS NOTES
Ms. Hawley is a patient of Dr. Pretty and was last seen in clinic 7/9/2019.      Subjective:   Patient ID:  Hafsa Hawley is a 54 y.o. female who presents for follow-up of Atrial Fibrillation  .     HPI:    Ms. Hawley is a 54 y.o. female with SVT (RFA 5/2019), NICM, CHF, HTN, Sleep Apnea, paroxysmal atrial fibrillation (on eliquis), Fibromyalgia, ICD here for follow up.     Background:    Primary cardiologist is Dr. Cortez.  Longstanding history of non-ischemic cardiomyopathy.  [facility] 11/15/15 EF 30-35% with normal coronary arteries.  Has been on optimal medical therapy.  Admitted to [facility] 9/16 with acute decompensated Heart failure. Diuresed 12 liters.  Echo 8/24/16 EF 20%.  Repeat echo 10/17/16 EF 30-35% with small pericardial effusion.  ICD implanted 12/2/16 (VDD single pass lead).    8/20/2018: Went to ED with chest tightness and palpitations. HR in 150s and found to be in SVT. Given diltiazem and spontaneously converted back to sinus rhythm.   8/30/2018: CHF hospitalization. No arrhythmia during this admission.  10/21/2018: ED with CP. No arrhythmia during this admission. Device check 10/29/2019 showed SVTx2, max 52 seconds during this period.  11/17/2018: ED with CP. SVT on EKG. Spontaneous conversion.  1/27/2019: ED with chest pain and SOB. She was given Adnoesine 6mg x 1 with conversion to SR. Device check 1/30/2019 showed SVTx19, longest 10 minutes.  On EKG SVT c/w AVNRT. Ablation planned.    Underwent successful slow pathway modification on 5/23/2019. No palpitations reported at last clinic visit 7/2019.     Update (02/04/2020):    Today she continues to experience her chronic chest/arm pain. (Nuclear stress test 10/2018 negative for ischemia.) She does not remember any sustained palpitations. No new chest pain beyond her usual non-exertional chronic pain. Denies LAGUERRE, LH, syncope. She complains of significant fatigue, restless legs, and insomnia.    She is currently taking eliquis 5mg BID for  stroke prophylaxis and denies significant bleeding episodes. She is currently being treated with carvedilol 25mg BID for HR control.  Kidney function is stable, with a creatinine of 1.1 on 8/22/2019.    Device Interrogation (12/27/2019 - remote) reveals an intrinsic ASVP with stable lead and device function. Atrial arrhythmias: x9 Labelled SVT ranging from 6 seconds to 12 minutes. Longest was 12 minutes on 12/01/2019 at 12:30 AM. Heart rates 130s-140s. The brief episodes are probably ST. She paces 0% in the RV. Estimated battery longevity 85%.     Device interrogation today (2/4/2019) continues to show brief episodes binned as SVT. Likely ST. No RV pacing.    I have personally reviewed the patient's EKG today, which shows sinus rhythm at 78bpm. OK interval is 182. QRS is 96. QTc is 453.    Recent Cardiac Tests:    2D Echo (9/17/2019):  · Mild-Moderately decreased left ventricular systolic function. The estimated ejection fraction is 35-40%  · Significantly thickened myocardium, moderate concentric left ventricular hypertrophy.  · Grade I (mild) left ventricular diastolic dysfunction consistent with impaired relaxation.  · Mild left ventricular enlargement.  · Moderate left atrial enlargement.  · Normal right ventricular systolic function.  · Mild mitral regurgitation.  · Trivial Pericardial effusion.  · Intermediate central venous pressure (8 mm Hg).  · The estimated PA systolic pressure is 43 mm Hg    Current Outpatient Medications   Medication Sig    albuterol-ipratropium 2.5mg-0.5mg/3mL (DUO-NEB) 0.5 mg-3 mg(2.5 mg base)/3 mL nebulizer solution Take 1 mL by nebulization every 6 (six) hours as needed for Wheezing or Shortness of Breath.    ALPRAZolam (XANAX) 1 MG tablet Take 1 mg by mouth nightly as needed for Anxiety.    apixaban (ELIQUIS) 5 mg Tab Take 1 tablet (5 mg total) by mouth 2 (two) times daily.    atorvastatin (LIPITOR) 40 MG tablet Take 1 tablet (40 mg total) by mouth once daily.    b complex  vitamins tablet Take 1 tablet by mouth once daily.    budesonide-formoterol 80-4.5 mcg (SYMBICORT) 80-4.5 mcg/actuation HFAA Inhale 2 puffs twice a day by inhalation route.    carvedilol (COREG) 25 MG tablet Take 1 tablet by mouth 2 (two) times daily.    cyanocobalamin, vitamin B-12, 50 mcg tablet Take 5 mcg by mouth once daily.    diclofenac sodium (VOLTAREN) 1 % Gel Apply 2 g topically 3 (three) times daily.    ergocalciferol (ERGOCALCIFEROL) 50,000 unit Cap ergocalciferol (vitamin D2) 50,000 unit capsule   Take 1 capsule every week by oral route.    fluticasone propionate (FLONASE) 50 mcg/actuation nasal spray Spray 2 sprays every day by intranasal route.    fluticasone-salmeterol diskus inhaler 100-50 mcg Inhale 1 puff into the lungs 2 (two) times daily. Controller    furosemide (LASIX) 80 MG tablet Take 1 tablet (80 mg total) by mouth 2 (two) times daily. TAKE EXTRA DOSE FOR GAIN OF 2 OR MORE POUND WEIGHT IN 1 DAY OR 5 POUNDS IN 1 WEEK.    isosorbide dinitrate (ISORDIL) 20 MG tablet Take 1 tablet (20 mg total) by mouth 2 (two) times daily. Please check that this is what you are already taking at home.    montelukast (SINGULAIR) 10 mg tablet Take 1 tablet every day by oral route for 30 days.    NITROSTAT 0.4 mg SL tablet Take 2.5 tablets (1 mg total) by mouth every 5 (five) minutes as needed for Chest pain. No more than 3 tablets in 15 minutes.    pantoprazole (PROTONIX) 40 MG tablet Take 1 tablet by mouth once daily.    potassium chloride (KLOR-CON) 10 MEQ TbSR Take 1 tablet (10 mEq total) by mouth once daily.    rOPINIRole 6 mg Tb24 Take 0.5 mg by mouth once daily.     sacubitril-valsartan (ENTRESTO)  mg per tablet Take 1 tablet by mouth 2 (two) times daily.    silver sulfADIAZINE 1% (SILVADENE) 1 % cream Silvadene 1 % topical cream   APPLY A 1/16 INCH (1.5 MM) THICK LAYER TO ENTIRE BURN AREA BY TOPICALROUTE 2 TIMES PER DAY for 10 days    spironolactone (ALDACTONE) 25 MG tablet Take 1  "tablet (25 mg total) by mouth once daily.    VENTOLIN HFA 90 mcg/actuation inhaler Inhale 2 puffs into the lungs 2 (two) times daily as needed.     walker Misc 1 Device by Misc.(Non-Drug; Combo Route) route as needed.     No current facility-administered medications for this visit.      Facility-Administered Medications Ordered in Other Visits   Medication    0.9%  NaCl infusion    vancomycin in dextrose 5 % 1 gram/250 mL IVPB 1,000 mg       Review of Systems   Constitution: Positive for malaise/fatigue.   Cardiovascular: Positive for chest pain (non-extertional, chronic). Negative for dyspnea on exertion, irregular heartbeat, leg swelling and palpitations.   Respiratory: Negative for shortness of breath.    Hematologic/Lymphatic: Negative for bleeding problem.   Skin: Negative for rash.   Musculoskeletal: Negative for myalgias.   Gastrointestinal: Negative for hematemesis, hematochezia and nausea.   Genitourinary: Negative for hematuria.   Neurological: Positive for excessive daytime sleepiness and sensory change. Negative for light-headedness.   Psychiatric/Behavioral: Negative for altered mental status. The patient has insomnia.    Allergic/Immunologic: Negative for persistent infections.         Objective:          /76   Pulse 78   Ht 5' 6" (1.676 m)   Wt 102 kg (224 lb 13.9 oz)   LMP  (LMP Unknown)   BMI 36.29 kg/m²     Physical Exam   Constitutional: She is oriented to person, place, and time. She appears well-developed and well-nourished.   HENT:   Head: Normocephalic.   Nose: Nose normal.   Eyes: Pupils are equal, round, and reactive to light.   Cardiovascular: Normal rate, regular rhythm, S1 normal and S2 normal.   No murmur heard.  Pulses:       Radial pulses are 2+ on the right side, and 2+ on the left side.   Pulmonary/Chest: Breath sounds normal. No respiratory distress.   Device to LUCW.   Abdominal: Normal appearance.   Musculoskeletal: Normal range of motion. She exhibits no edema. " "  Neurological: She is alert and oriented to person, place, and time.   Skin: Skin is warm and dry. No erythema.   Psychiatric: She has a normal mood and affect. Her speech is normal and behavior is normal.   Nursing note and vitals reviewed.    Lab Results   Component Value Date     08/22/2019    K 3.7 08/22/2019    MG 1.6 01/28/2019    BUN 25 (H) 08/22/2019    CREATININE 1.18 08/22/2019    ALT 18 08/22/2019    AST 25 08/22/2019    HGB 9.1 (L) 08/22/2019    HCT 27.7 (L) 08/22/2019    TSH 0.760 10/21/2018    LDLCALC 62.0 (L) 10/21/2018     Recent Labs   Lab 01/27/19  2135 04/09/19  1548 05/16/19  0936 06/21/19  1136   INR 1.2 1.3 H 1.2 1.1       Assessment:     1. Paroxysmal atrial fibrillation    2. Paroxysmal supraventricular tachycardia    3. NICM (nonischemic cardiomyopathy)    4. Essential hypertension    5. ICD (implantable cardioverter-defibrillator) in place    6. MELISSA (obstructive sleep apnea)      Plan:     In summary, Ms. Hawley is a 54 y.o. female with SVT (RFA 5/2019), NICM, CHF, HTN, Sleep Apnea, paroxysmal atrial fibrillation (on eliquis), Fibromyalgia, ICD here for follow up.   She is 7 months s/p RFA for AVNRT. Device report shows "SVT" episode in 130s-140s lasting 12 minutes 12/1/2020 at 12:30 AM. Patient does not recall this episode specifically (maybe fluttering), but she does continue to report chronic atypical CP and fatigue. She is also experiencing RLS and insomnia. Denies experiencing sustained palpitations. Other binned SVT episodes lasting seconds, likely ST. She is on 25mg carvedilol BID and BP will not permit up titration. Given minimal symptoms, will make no med changes today and continue to monitor.  If her device continues to show SVT episodes or she reports more symptoms, can change carvedilol to metoprolol and increase dose. Advised her to see sleep medicine for insomnia management.   No RV pacing. Her last echo showed an increase in EF from 25% to almost 40%.    Continue " current medications and device checks.  RTC 3 months, sooner if needed.    *A copy of this note has been sent to Dr. Pretty*    Follow up in about 3 months (around 5/4/2020).    ------------------------------------------------------------------    LYNN Fields, NP-C  Cardiac Electrophysiology

## 2020-02-11 ENCOUNTER — CLINICAL SUPPORT (OUTPATIENT)
Dept: CARDIOLOGY | Facility: HOSPITAL | Age: 55
End: 2020-02-11
Payer: MEDICAID

## 2020-02-11 DIAGNOSIS — Z95.810 PRESENCE OF AUTOMATIC (IMPLANTABLE) CARDIAC DEFIBRILLATOR: ICD-10-CM

## 2020-02-11 PROCEDURE — 93295 DEV INTERROG REMOTE 1/2/MLT: CPT | Mod: ,,, | Performed by: INTERNAL MEDICINE

## 2020-02-11 PROCEDURE — 93296 REM INTERROG EVL PM/IDS: CPT | Performed by: INTERNAL MEDICINE

## 2020-02-11 PROCEDURE — 93295 CARDIAC DEVICE CHECK - REMOTE: ICD-10-PCS | Mod: ,,, | Performed by: INTERNAL MEDICINE

## 2020-02-28 NOTE — PROGRESS NOTES
[facility]  History & Physical  Orthopedics    SUBJECTIVE:      ([place])    Chief Complaint: follow up of left arm pain swelling in elbow, wrist, and hand.      History of Present Illness:    Patient is a 54 y.o. right hand dominant female who was seen 12/19/19 for pain in left elbow, wrist, and hand x  months. XRs of left wrist looked good. She may have small dorsal wrist ganglion, but it was not impressive on exam at last visit. She likely also had lateral epicondylitis of the elbow which may be contributing to her diffuse pain.     She was given voltaren gel and sent to OT (she did not go as no one called her). Here for follow up.     No relief with voltaren gel. She would like to try lidoderm patches. Another provider (Dr. Taylor at [facility]) ordered an EMG- she thinks this showed carpal tunnel.     She continues with diffuse left arm pain/heaviness. No neck pain noted. Pain feels like it radiates up from her hand. She has numbness and tinging in left hand. Pain is worse with lifting. Pain is better with nothing. She rates her pain as a 2 on a scale of 1-10. No PT, injections, or surgery on her hand.      She has a cardiac defibrillator and is on ELIQUIS.        Past Medical History:   Diagnosis Date    Anemia     Anticoagulant long-term use     Arthritis     Atrial fibrillation     Congestive heart failure     COPD (chronic obstructive pulmonary disease)     Deep vein thrombosis     elevated bilirubin d/t Gilbert's syndrome     confirmed by [place] genetic testing, evaluated by hepatology    Encounter for blood transfusion     GERD (gastroesophageal reflux disease)     Hypertension     Pheochromocytoma, malignant     Right kidney mass     Sleep apnea     Thyroid disease      Past Surgical History:   Procedure Laterality Date    APPENDECTOMY      CARDIAC DEFIBRILLATOR PLACEMENT Left 12/2016    EYE SURGERY      due to running tears    FRACTURE SURGERY Left     hand  "5th digit    HYSTERECTOMY      KNEE SURGERY Left 2016    hematoma    LIVER BIOPSY  10/24/2018    Minimal steatosis, predominantly macrovesicular, 1%, Minimal nonspecific portal inflammation, no fibrosis. No findings on biopsy to explain elevated bilirubin levels. Could be d/t Gilbert's =?- hemolysis    TRANSJUGULAR BIOPSY OF LIVER N/A 10/24/2018    Procedure: BIOPSY, LIVER, TRANSJUGULAR APPROACH;  Surgeon: Carmen Diagnostic Provider;  Location: [facility] OR [address] [place] [address];  Service: Radiology;  Laterality: N/A;     Review of patient's allergies indicates:   Allergen Reactions    Penicillins Hives    Iodinated contrast media Nausea And Vomiting    Percocet [oxycodone-acetaminophen] Other (See Comments)     Nausea, Dizziness, Anxiety.  "I don't like how it makes me feel."   Given Hydromorphone 0.5mg IVP  Without problems.    Diovan hct [valsartan-hydrochlorothiazide] Other (See Comments)     Causes coughing    Irinotecan      Pt has homozygosity for the TA7 promoter variant that places this individual at significantly increased risk for   severe neutropenia (grade 4) when treated with the standard dose of irinotecan (risk approximately 50%).   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 Metabolism of other drugs not listed here may also be impacted by UGT1A1 enzyme activity.       Tramadol Nausea And Vomiting     Social History     Social History Narrative    On disability since      Family History   Problem Relation Age of Onset    Cancer Mother         pancreatic CA early 50's    Heart disease Father          MI in late 50's    Hypertension Father     Heart attack Father     Heart disease Sister     Heart disease Brother     Cirrhosis Brother         alcoholic    Heart disease Sister     Heart disease Brother     Hypertension Brother     Diabetes Brother          Current Outpatient " Medications:     albuterol-ipratropium 2.5mg-0.5mg/3mL (DUO-NEB) 0.5 mg-3 mg(2.5 mg base)/3 mL nebulizer solution, Take 1 mL by nebulization every 6 (six) hours as needed for Wheezing or Shortness of Breath., Disp: 1 Box, Rfl: 2    ALPRAZolam (XANAX) 1 MG tablet, Take 1 mg by mouth nightly as needed for Anxiety., Disp: , Rfl:     apixaban (ELIQUIS) 5 mg Tab, Take 1 tablet (5 mg total) by mouth 2 (two) times daily., Disp: 60 tablet, Rfl: 0    atorvastatin (LIPITOR) 40 MG tablet, Take 1 tablet (40 mg total) by mouth once daily., Disp: 90 tablet, Rfl: 3    b complex vitamins tablet, Take 1 tablet by mouth once daily., Disp: , Rfl:     budesonide-formoterol 80-4.5 mcg (SYMBICORT) 80-4.5 mcg/actuation HFAA, Inhale 2 puffs twice a day by inhalation route., Disp: , Rfl:     carvedilol (COREG) 25 MG tablet, Take 1 tablet by mouth 2 (two) times daily., Disp: , Rfl:     cyanocobalamin, vitamin B-12, 50 mcg tablet, Take 5 mcg by mouth once daily., Disp: , Rfl:     diclofenac sodium (VOLTAREN) 1 % Gel, Apply 2 g topically 3 (three) times daily., Disp: 5 Tube, Rfl: 2    ergocalciferol (ERGOCALCIFEROL) 50,000 unit Cap, ergocalciferol (vitamin D2) 50,000 unit capsule  Take 1 capsule every week by oral route., Disp: , Rfl:     fluticasone-salmeterol diskus inhaler 100-50 mcg, Inhale 1 puff into the lungs 2 (two) times daily. Controller, Disp: , Rfl:     furosemide (LASIX) 80 MG tablet, Take 1 tablet (80 mg total) by mouth 2 (two) times daily. TAKE EXTRA DOSE FOR GAIN OF 2 OR MORE POUND WEIGHT IN 1 DAY OR 5 POUNDS IN 1 WEEK., Disp: 60 tablet, Rfl: 1    isosorbide dinitrate (ISORDIL) 20 MG tablet, Take 1 tablet (20 mg total) by mouth 2 (two) times daily. Please check that this is what you are already taking at home., Disp: , Rfl:     montelukast (SINGULAIR) 10 mg tablet, Take 1 tablet every day by oral route for 30 days., Disp: , Rfl:     NITROSTAT 0.4 mg SL tablet, Take 2.5 tablets (1 mg total) by mouth every 5 (five)  minutes as needed for Chest pain. No more than 3 tablets in 15 minutes., Disp: , Rfl:     potassium chloride (KLOR-CON) 10 MEQ TbSR, Take 1 tablet (10 mEq total) by mouth once daily., Disp: , Rfl:     rOPINIRole 6 mg Tb24, Take 0.5 mg by mouth once daily. , Disp: , Rfl:     sacubitril-valsartan (ENTRESTO)  mg per tablet, Take 1 tablet by mouth 2 (two) times daily., Disp: , Rfl:     spironolactone (ALDACTONE) 25 MG tablet, Take 1 tablet (25 mg total) by mouth once daily., Disp: 30 tablet, Rfl: 1    VENTOLIN HFA 90 mcg/actuation inhaler, Inhale 2 puffs into the lungs 2 (two) times daily as needed. , Disp: , Rfl: 11    walker Misc, 1 Device by Misc.(Non-Drug; Combo Route) route as needed., Disp: 1 each, Rfl: 0    fluticasone propionate (FLONASE) 50 mcg/actuation nasal spray, Spray 2 sprays every day by intranasal route., Disp: , Rfl:     lidocaine (LIDODERM) 5 %, Place 1 patch onto the skin once daily. Keep patch on for 12 hours then remove. One patch daily (12 hours on/12 hours off)., Disp: 30 patch, Rfl: 2    pantoprazole (PROTONIX) 40 MG tablet, Take 1 tablet by mouth once daily., Disp: , Rfl: 5    silver sulfADIAZINE 1% (SILVADENE) 1 % cream, Silvadene 1 % topical cream  APPLY A 1/16 INCH (1.5 MM) THICK LAYER TO ENTIRE BURN AREA BY TOPICALROUTE 2 TIMES PER DAY for 10 days, Disp: , Rfl:   No current facility-administered medications for this visit.     Facility-Administered Medications Ordered in Other Visits:     0.9%  NaCl infusion, , Intravenous, Continuous, Corinna Hayes NP    vancomycin in dextrose 5 % 1 gram/250 mL IVPB 1,000 mg, 1,000 mg, Intravenous, On Call Procedure, Corinna Hayes NP, 1,000 mg at 05/23/19 0736      Review of Systems   Constitutional: Negative for chills and fever.   Eyes: Negative for discharge and redness.   Respiratory: Negative for cough, shortness of breath and wheezing.    Cardiovascular: Negative for chest pain.   Gastrointestinal: Negative for  "nausea and vomiting.   Skin: Negative for rash.       OBJECTIVE:      Vital Signs (Most Recent):  Vitals:    03/02/20 1038   BP: 134/80   BP Location: Right arm   Patient Position: Sitting   BP Method: X-Large (Manual)   Pulse: 66   Resp: 16   Weight: 100.4 kg (221 lb 6.4 oz)   Height: 5' 6" (1.676 m)     Body mass index is 35.73 kg/m².      Physical Exam:    No posterior cervical tenderness. Mild left trapezial tenderness.     Strength Testing of Bilateral UEs shows  Right :  +5/5   Left :  +5/5  Right deltoid:  +5/5   Left deltoid:  +5/5  Right biceps:  +5/5   Left biceps:  +5/5  Right triceps:  +5/5   Left triceps:  +5/5  Right wrist extension:  +5/5  Left wrist extension:  +5/5  Right wrist flexion:  +5/5  Left wrist flexion:  +5/5  Right interosseus:  +5/5  Left interosseus:  +5/5    No pain with ROM of left shoulder, no pain with IR/ER of shoulder.     Left Hand/Wrist Examination:    Observation/Inspection:  Swelling  She has small area of swelling dorsal wrist that is not very impressive.    Deformity  none  Discoloration  none     Scars   none    Atrophy  none    HAND/WRIST EXAMINATION:  Finkelstein's Test   Neg  Snuff box tenderness   Neg  Hook of Hamate Tenderness  Neg  CMC grind    Neg  Circumduction test   Neg    She has minimal tenderness at wrist.     Neurovascular Exam:  Digits WWP, brisk CR < 3s throughout  NVI motor/LTS to M/R/U nerves, radial pulse 2+  Tinel's Test - Carpal Tunnel  Neg  Tinel's Test - Cubital Tunnel  Neg  Phalen's Test    positive  Median Nerve Compression Test positive    ROM hand/wrist/elbow full with minimal pain on wrist extension. She has tenderness over area of lateal epicondyle left elbow. She has pain with resisted extension of the fingers. No instability of elbow noted.       ASSESSMENT/PLAN:      54 y.o. yo female with diffuse left arm pain/heaviness. No neck pain noted. Pain feels like it radiates up from her hand. She has numbness and tinging in left hand. " Thinks she had EMG, not sure of results.     Current pain does not appear cervical or shoulder mediated on exam. May have some component of lateral epicondylitis of the left elbow. Treatment options reviewed with patient and following plan made:     - New prescription for lidoderm patches sent to pharmacy. Stop voltaren gel.   - Referral to OT for diffuse left arm pain. This should help localize her diffuse pain complaints. Internal orders to [facility].   - Will try to get EMG results. She signed MARY ANN.   - Will call her once I get EMG results.     She will follow up with me in 3 months for recheck of diffuse left arm pain.

## 2020-03-02 ENCOUNTER — OFFICE VISIT (OUTPATIENT)
Dept: ORTHOPEDICS | Facility: CLINIC | Age: 55
End: 2020-03-02
Payer: MEDICAID

## 2020-03-02 VITALS
DIASTOLIC BLOOD PRESSURE: 80 MMHG | HEART RATE: 66 BPM | BODY MASS INDEX: 35.58 KG/M2 | RESPIRATION RATE: 16 BRPM | HEIGHT: 66 IN | WEIGHT: 221.38 LBS | SYSTOLIC BLOOD PRESSURE: 134 MMHG

## 2020-03-02 DIAGNOSIS — M25.522 LEFT ELBOW PAIN: ICD-10-CM

## 2020-03-02 DIAGNOSIS — R20.2 NUMBNESS AND TINGLING IN LEFT HAND: ICD-10-CM

## 2020-03-02 DIAGNOSIS — M25.532 LEFT WRIST PAIN: Primary | ICD-10-CM

## 2020-03-02 DIAGNOSIS — R20.0 NUMBNESS AND TINGLING IN LEFT HAND: ICD-10-CM

## 2020-03-02 PROCEDURE — 99214 OFFICE O/P EST MOD 30 MIN: CPT | Mod: S$PBB,,, | Performed by: PHYSICIAN ASSISTANT

## 2020-03-02 PROCEDURE — 99999 PR PBB SHADOW E&M-EST. PATIENT-LVL V: ICD-10-PCS | Mod: PBBFAC,,, | Performed by: PHYSICIAN ASSISTANT

## 2020-03-02 PROCEDURE — 99215 OFFICE O/P EST HI 40 MIN: CPT | Mod: PBBFAC,PN | Performed by: PHYSICIAN ASSISTANT

## 2020-03-02 PROCEDURE — 99214 PR OFFICE/OUTPT VISIT, EST, LEVL IV, 30-39 MIN: ICD-10-PCS | Mod: S$PBB,,, | Performed by: PHYSICIAN ASSISTANT

## 2020-03-02 PROCEDURE — 99999 PR PBB SHADOW E&M-EST. PATIENT-LVL V: CPT | Mod: PBBFAC,,, | Performed by: PHYSICIAN ASSISTANT

## 2020-03-02 RX ORDER — LIDOCAINE 50 MG/G
1 PATCH TOPICAL DAILY
Qty: 30 PATCH | Refills: 2 | Status: SHIPPED | OUTPATIENT
Start: 2020-03-02 | End: 2021-12-22

## 2020-03-02 NOTE — PATIENT INSTRUCTIONS
It was nice to see you again today! I am sorry that you are still hurting.     I am going to try to get your nerve test results. Will call you when I do.     I sent lidoderm patches to your pharmacy to help with pain/inflammation. Use as directed. Put one patch on for 12 hours and then off for 12 hours.     I sent occupational therapy orders to [facility] here at [place]. They should call you to set up, but if not you can call [phone].      I will see you back in 3 months, but please stay in touch and call me if you need anything. You can also send me a message in [facility].     Demetria   [phone]

## 2020-03-10 ENCOUNTER — OFFICE VISIT (OUTPATIENT)
Dept: TRANSPLANT | Facility: CLINIC | Age: 55
End: 2020-03-10
Payer: MEDICAID

## 2020-03-10 VITALS
SYSTOLIC BLOOD PRESSURE: 129 MMHG | HEART RATE: 75 BPM | DIASTOLIC BLOOD PRESSURE: 69 MMHG | BODY MASS INDEX: 35.61 KG/M2 | HEIGHT: 66 IN | WEIGHT: 221.56 LBS

## 2020-03-10 DIAGNOSIS — I47.10 PAROXYSMAL SUPRAVENTRICULAR TACHYCARDIA: Chronic | ICD-10-CM

## 2020-03-10 DIAGNOSIS — I48.0 PAROXYSMAL ATRIAL FIBRILLATION: Chronic | ICD-10-CM

## 2020-03-10 DIAGNOSIS — G47.33 OSA (OBSTRUCTIVE SLEEP APNEA): Chronic | ICD-10-CM

## 2020-03-10 DIAGNOSIS — J43.0 UNILATERAL EMPHYSEMA: Primary | Chronic | ICD-10-CM

## 2020-03-10 DIAGNOSIS — I42.8 NICM (NONISCHEMIC CARDIOMYOPATHY): Chronic | ICD-10-CM

## 2020-03-10 DIAGNOSIS — I10 ESSENTIAL HYPERTENSION: Chronic | ICD-10-CM

## 2020-03-10 DIAGNOSIS — Z95.810 ICD (IMPLANTABLE CARDIOVERTER-DEFIBRILLATOR) IN PLACE: Chronic | ICD-10-CM

## 2020-03-10 DIAGNOSIS — I51.7 LEFT VENTRICULAR HYPERTROPHY: Chronic | ICD-10-CM

## 2020-03-10 DIAGNOSIS — I50.42 CHRONIC COMBINED SYSTOLIC AND DIASTOLIC HEART FAILURE: ICD-10-CM

## 2020-03-10 DIAGNOSIS — N18.30 CHRONIC KIDNEY DISEASE, STAGE III (MODERATE): Chronic | ICD-10-CM

## 2020-03-10 PROCEDURE — 99214 OFFICE O/P EST MOD 30 MIN: CPT | Mod: S$PBB,,, | Performed by: INTERNAL MEDICINE

## 2020-03-10 PROCEDURE — 99999 PR PBB SHADOW E&M-EST. PATIENT-LVL III: CPT | Mod: PBBFAC,,, | Performed by: INTERNAL MEDICINE

## 2020-03-10 PROCEDURE — 99213 OFFICE O/P EST LOW 20 MIN: CPT | Mod: PBBFAC | Performed by: INTERNAL MEDICINE

## 2020-03-10 PROCEDURE — 99214 PR OFFICE/OUTPT VISIT, EST, LEVL IV, 30-39 MIN: ICD-10-PCS | Mod: S$PBB,,, | Performed by: INTERNAL MEDICINE

## 2020-03-10 PROCEDURE — 99999 PR PBB SHADOW E&M-EST. PATIENT-LVL III: ICD-10-PCS | Mod: PBBFAC,,, | Performed by: INTERNAL MEDICINE

## 2020-03-10 RX ORDER — FUROSEMIDE 80 MG/1
80 TABLET ORAL 2 TIMES DAILY
Qty: 60 TABLET | Refills: 1 | Status: SHIPPED | OUTPATIENT
Start: 2020-03-10 | End: 2020-06-05 | Stop reason: SDUPTHER

## 2020-03-10 NOTE — PROGRESS NOTES
Subjective:     HPI:  Ms. Hawley is a 54 y.o. year old Black or  female who has presents for one month follow up.Seen by Dr Lorenzo to be evaluated as a potential heart transplant recipient (referred by Junior / Dana)   At her initial visit in July 2018, she was told to complete her pheochromocytoma. She was seen by [facility] who feel she is unlikely to have pheochromocytoma  They recommend that we can pursue heart transplant workup.      She is here for follow up LAGUERRE FC II NYHA echo demonstrate some reverse remodeling EF 35 to 40.  Had nerve pain in the hands and back, she is tired all the time when her BP is low  (120)    · Mild-Moderately decreased left ventricular systolic function. The estimated ejection fraction is 35-40%  · Significantly thickened myocardium, moderate concentric left ventricular hypertrophy.  · Grade I (mild) left ventricular diastolic dysfunction consistent with impaired relaxation.  · Mild left ventricular enlargement.  · Moderate left atrial enlargement.  · Normal right ventricular systolic function.  · Mild mitral regurgitation.  · Trivial Pericardial effusion.  · Intermediate central venous pressure (8 mm Hg).  · The estimated PA systolic pressure is 43 mm Hg      Patient was placed on I.V Lasix and this improved SOB and currently home lasix increased to 80 mg BID. Small dose aldactone added too. Patient asked to take extra dose lasix for gain of weight as advised before. She did not take extra dose for past 2 months as thought weight gain was sec to change in diet habits and her gaining weight and not fluid. Also asked to be compliant with low salt diet(was not for past 2 months as she was with family or went out to eat). A flutter on admit sec to acute on chronic Combined HF. Was on Amiodarone drip for only few hours and then had sinus bradycardia. Currently in NSR and rate controlled with home dose Coreg. Cont Eliquis. She is now being d/beronica home  in a stable condition and cont to f/u with Cardiology as out patient.      PREVIOUS HISTORY    Patient most recently admitted at [facility] with ADHF, BNP significantly elevated at 2573, had to be diuresed, additionally had episode of VT x 1, and chest pain overnight. Had elevated troponin as well at around .6, felt to be due to ADHF and tachycardia. Bili was elevated as well, bili all the way up to 5.6. Last visit was sent to hepatology and they plan to do US  elastography but felt ok to proceed with LVAD/OHT workup. Last visit she was to increase isordil to 2 tablets TID but did not do this. Today, feels less SOB. Has some mild musculoskeletal pains she feels is related to her AICD.      TTE 08/31/18:    1 - Severely depressed left ventricular systolic function (EF 20-25%).     2 - Impaired LV relaxation, elevated LAP (grade 2 diastolic dysfunction).     3 - Pulmonary hypertension. The estimated PA systolic pressure is 43 mmHg.     4 - Mild mitral regurgitation.     5 - Increased central venous pressure.     6 - Moderate pulmonic regurgitation.     7- LVH     Six minute walk 06/21/2018---------Distance: 292.61 meters (960 feet)     O2 Sat % Supplemental Oxygen Heart Rate Blood Pressure Haseeb   Scale   Pre-exercise  (Resting) 98 % Room Air 70 bpm 173/97 mmHg 0.5   During Exercise 95 % Room Air 80 bpm 143/100 mmHg 5-6   Post-exercise  (Recovery) 98 % Room Air  68 bpm 141/93 mmHg       FINAL PATHOLOGIC DIAGNOSIS  Liver, random, biopsy:  - Minimal steatosis, predominantly macrovesicular, 1%.  - Minimal nonspecific portal inflammation.  - No significant fibrosis seen.  - PAS-D and copper pending, results will be issued in an addendum.  - See comment.  COMMENT: The biopsy is adequate for evaluation. The patient's history of elevated total bilirubin is noted and  select information from the electronic medical record is reviewed. The biopsy is relatively unremarkable with  minimal steatosis and nonspecific portal inflammation.  There is no definitive steatohepatitis. There is no  cholestasis or ductular reaction seen. The bile ducts are unremarkable. There is no significant plasma cell  population seen. There are no granulomas seen. There is no increased iron on iron stain. There is no increased  fibrosis on trichrome stain. There is no clear etiology for elevated bilirubin seen on this biopsy. Appropriate positive  controls are examined.  Diagnosed      Past Medical History:   Diagnosis Date    Anemia     Anticoagulant long-term use     Arthritis     Atrial fibrillation     Congestive heart failure     COPD (chronic obstructive pulmonary disease)     Deep vein thrombosis     elevated bilirubin d/t Gilbert's syndrome     confirmed by [place] genetic testing, evaluated by hepatology    Encounter for blood transfusion     GERD (gastroesophageal reflux disease)     Hypertension     Pheochromocytoma, malignant     Right kidney mass     Sleep apnea     Thyroid disease      Past Surgical History:   Procedure Laterality Date    APPENDECTOMY      CARDIAC DEFIBRILLATOR PLACEMENT Left 2016    EYE SURGERY      due to running tears    FRACTURE SURGERY Left     hand 5th digit    HYSTERECTOMY      KNEE SURGERY Left 2016    hematoma    LIVER BIOPSY  10/24/2018    Minimal steatosis, predominantly macrovesicular, 1%, Minimal nonspecific portal inflammation, no fibrosis. No findings on biopsy to explain elevated bilirubin levels. Could be d/t Gilbert's =?- hemolysis    TRANSJUGULAR BIOPSY OF LIVER N/A 10/24/2018    Procedure: BIOPSY, LIVER, TRANSJUGULAR APPROACH;  Surgeon: Carmen Diagnostic Provider;  Location: [facility] OR [address] [place] [address];  Service: Radiology;  Laterality: N/A;     OB History        2    Para   2    Term   2            AB        Living           SAB        TAB        Ectopic        Multiple        Live Births                   Review of Systems   Constitution: Negative for chills, decreased appetite, diaphoresis,  "fever and weight gain.   Cardiovascular: Positive for dyspnea on exertion. Negative for chest pain, claudication, cyanosis and paroxysmal nocturnal dyspnea.   Respiratory: Negative for cough, hemoptysis, shortness of breath, sleep disturbances due to breathing and snoring.    Skin: Negative for nail changes.   Gastrointestinal: Negative for bloating, nausea and vomiting.   Neurological: Negative for weakness.   Psychiatric/Behavioral: Negative for depression.       Objective:   Blood pressure 129/69, pulse 75, height 5' 6" (1.676 m), weight 100.5 kg (221 lb 9 oz).body mass index is 35.76 kg/m².  Physical Exam   Constitutional: She appears well-developed.   HENT:   Head: Normocephalic.   Eyes: Pupils are equal, round, and reactive to light.   Neck: Normal range of motion. No JVD present.   Cardiovascular: Normal rate. Exam reveals no friction rub.   No murmur heard.  Pulmonary/Chest: Effort normal. No respiratory distress.   Abdominal: Soft. She exhibits no distension. There is no tenderness.   Musculoskeletal: Normal range of motion. She exhibits no edema.   Neurological: She is alert. No cranial nerve deficit.   Skin: Skin is warm. She is not diaphoretic. No erythema.       Labs:    Chemistry        Component Value Date/Time     08/22/2019 1417    K 3.7 08/22/2019 1417     08/22/2019 1417    CO2 28 08/22/2019 1417    BUN 25 (H) 08/22/2019 1417    CREATININE 1.18 08/22/2019 1417     (H) 08/22/2019 1417        Component Value Date/Time    CALCIUM 9.2 08/22/2019 1417    ALKPHOS 67 08/22/2019 1417    AST 25 08/22/2019 1417    ALT 18 08/22/2019 1417    BILITOT 1.7 (H) 08/22/2019 1417    ESTGFRAFRICA >60.0 08/22/2019 1417    EGFRNONAA 52.8 (A) 08/22/2019 1417          Magnesium   Date Value Ref Range Status   01/28/2019 1.6 1.6 - 2.6 mg/dL Final     Lab Results   Component Value Date    WBC 5.52 08/22/2019    HGB 9.1 (L) 08/22/2019    HCT 27.7 (L) 08/22/2019     08/22/2019     Lab Results "   Component Value Date    INR 1.1 06/21/2019    INR 1.2 05/16/2019    INR 1.3 (H) 04/09/2019     BNP   Date Value Ref Range Status   08/07/2019 219 (H) 0 - 99 pg/mL Final     Comment:     Values of less than 100 pg/ml are consistent with non-CHF populations.   02/14/2019 301 (H) 0 - 99 pg/mL Final     Comment:     Values of less than 100 pg/ml are consistent with non-CHF populations.   10/21/2018 924 (H) 0 - 99 pg/mL Final     Comment:     Values of less than 100 pg/ml are consistent with non-CHF populations.     LD   Date Value Ref Range Status   06/11/2018 308 (H) 110 - 260 U/L Final     Comment:     Results are increased in hemolyzed samples.   08/23/2016 280 (H) 110 - 260 U/L Final     Comment:     Results are increased in hemolyzed samples.     No results found for this or any previous visit.  No results found for this or any previous visit.    Assessment:      1. Unilateral emphysema    2. Paroxysmal supraventricular tachycardia    3. Paroxysmal atrial fibrillation    4. NICM (nonischemic cardiomyopathy)    5. Left ventricular hypertrophy    6. ICD (implantable cardioverter-defibrillator) in place    7. Essential hypertension    8. Chronic combined systolic and diastolic heart failure    9. Chronic kidney disease, stage III (moderate)    10. MELISSA (obstructive sleep apnea)        Plan:   HFrEF FC II NYHA on entresto 97/103 and reverse remodeling euvolemic    DC spironolactone and isordil    RTC 4 months      Patient is now NYHA II  Recommend 2 gram sodium restriction and 1500cc fluid restriction.  Encourage physical activity with graded exercise program.  Requested patient to weigh themselves daily, and to notify us if their weight increases by more than 3 lbs in 1 day or 5 lbs in 1 week.     Listed for transplant: No

## 2020-03-10 NOTE — LETTER
March 10, 2020        Andrea Pacheco  [address]  [address]  [place] [address]  Phone: [phone]  Fax: [phone]             [facility] [address] [place] [address]  Phone: [phone]   Patient: Hafsa Hawley   MR Number: [id number]   YOB: 1965   Date of Visit: 3/10/2020       Dear Dr. Andrea Pacheco    Thank you for referring Hafsa Hawley to me for evaluation. Attached you will find relevant portions of my assessment and plan of care.    If you have questions, please do not hesitate to call me. I look forward to following Hafsa Hawley along with you.    Sincerely,    Armando Garcia MD    Enclosure    If you would like to receive this communication electronically, please contact [email or web address] or [phone] to request [employer] Link access.    [employer] Link is a tool which provides read-only access to select patient information with whom you have a relationship. Its easy to use and provides real time access to review your patients record including encounter summaries, notes, results, and demographic information.    If you feel you have received this communication in error or would no longer like to receive these types of communications, please e-mail [email or web address]

## 2020-03-30 ENCOUNTER — TELEPHONE (OUTPATIENT)
Dept: TRANSPLANT | Facility: CLINIC | Age: 55
End: 2020-03-30

## 2020-03-30 ENCOUNTER — TELEPHONE (OUTPATIENT)
Dept: CARDIOLOGY | Facility: HOSPITAL | Age: 55
End: 2020-03-30

## 2020-03-30 NOTE — TELEPHONE ENCOUNTER
1:25 pm  Reviewed message below and telephone note mentioned   Also see pt saw  3/20/2020.  Returned call to pt at this time-NA,LVM with my name, office with and reason for call  Asked she return my call. Left day, date, time and this office contact phone number.       ----- Message from Kateryna Steward sent at 3/30/2020 12:28 PM CDT -----  See phone message in notes today.     Thanks,  Doretha Steward

## 2020-03-30 NOTE — TELEPHONE ENCOUNTER
I contacted patient regarding her ICD transmission. No episodes detected and battery looks good. She stated that she has been having a cough with blood tinged phlegm and has a sore throat. She stated that she does not want to come to hospital because they always keep her. She stated that she also hasn't been tested for COVID-19. I will forward message to CHF clinic for Dr. Garcia.     ----- Message from Patience Farnsworth sent at 3/30/2020 11:04 AM CDT -----  Contact: Self  Pt has mild chest pain & fluttering, Pt asking if can check her device for any abnormal readings especially on Saturday, if can check & call.  Thanks     [phone]

## 2020-04-20 ENCOUNTER — TELEPHONE (OUTPATIENT)
Dept: ELECTROPHYSIOLOGY | Facility: CLINIC | Age: 55
End: 2020-04-20

## 2020-04-20 ENCOUNTER — TELEPHONE (OUTPATIENT)
Dept: ORTHOPEDICS | Facility: CLINIC | Age: 55
End: 2020-04-20

## 2020-04-20 NOTE — TELEPHONE ENCOUNTER
----- Message from Franklin Gray sent at 4/20/2020  2:51 PM CDT -----  Contact: patient  Type:  Needs Medical Advice    Who Called:  patient  Symptoms (please be specific):  Entire arm hurts from fingers to shoulder   How long has patient had these symptoms:   Every day  Pharmacy name and phone #:   n/a  Would the patient rather a call back or a response via [facility]?  Call back  Best Call Back Number:  [phone]  Additional Information:  Also needs to know about brace that was ordered for her

## 2020-04-20 NOTE — TELEPHONE ENCOUNTER
Spoke with patient  Concerning appointments on 5/4/2020 with SABINE Dominguez being cancelled. She is interested in a virtual visual visit with Dr. Pretty. She will down load Mychart to her phone.She denies current cardiac/arrhythmia concerns at  this time.  She will call with any concerns.

## 2020-04-20 NOTE — TELEPHONE ENCOUNTER
Patient request a audio visit with Demetria Márquez for tomorrow 5/21/2020. I set patient up for 10:30 am tomorrow. Vf/ma

## 2020-04-20 NOTE — TELEPHONE ENCOUNTER
----- Message from Franklin Gray sent at 4/20/2020  3:12 PM CDT -----  Contact: patient  Type:  Patient Returning Call    Who Called: Hafsa  Who Left Message for Patient: LITA Márquez's office  Does the patient know what this is regarding?: yes  Would the patient rather a call back or a response via [facility]?  Call back  Best Call Back Number: [phone]  Additional Information:  Please call back

## 2020-04-21 ENCOUNTER — OFFICE VISIT (OUTPATIENT)
Dept: ORTHOPEDICS | Facility: CLINIC | Age: 55
End: 2020-04-21
Payer: MEDICAID

## 2020-04-21 DIAGNOSIS — R20.2 NUMBNESS AND TINGLING IN LEFT HAND: ICD-10-CM

## 2020-04-21 DIAGNOSIS — R20.0 NUMBNESS AND TINGLING IN LEFT HAND: ICD-10-CM

## 2020-04-21 DIAGNOSIS — M79.602 LEFT ARM PAIN: Primary | ICD-10-CM

## 2020-04-21 PROCEDURE — 99442 PR PHYSICIAN TELEPHONE EVALUATION 11-20 MIN: CPT | Mod: 95,,, | Performed by: PHYSICIAN ASSISTANT

## 2020-04-21 PROCEDURE — 99442 PR PHYSICIAN TELEPHONE EVALUATION 11-20 MIN: ICD-10-PCS | Mod: 95,,, | Performed by: PHYSICIAN ASSISTANT

## 2020-04-21 NOTE — PATIENT INSTRUCTIONS
You can try calling these providers about being seen for your spine:    1. [facility] [phone]  2. [facility] [phone]  3. Dr. Omero Garcia [phone]    You can also try calling the [facility] Medicaid Escalation Line at [phone] and calling the number on the back of your medicaid card to ask about participating providers.

## 2020-04-21 NOTE — PROGRESS NOTES
[facility]  History & Physical  Orthopedics    SUBJECTIVE:      ([place])    The patient location is: [place]  The chief complaint leading to consultation is: follow up left arm pain  Visit type: audio only  Total time spent with patient: 12 minutes  Each patient to whom he or she provides medical services by telemedicine is:  (1) informed of the relationship between the physician and patient and the respective role of any other health care provider with respect to management of the patient; and (2) notified that he or she may decline to receive medical services by telemedicine and may withdraw from such care at any time.    Of note, patient does not have access to smart phone or tablet.     Notes:     Chief Complaint: follow up of left arm pain swelling in elbow, wrist, and hand.      History of Present Illness:    Patient is a 54 y.o. right hand dominant female who was seen 12/19/19 for pain in left elbow, wrist, and hand x  months. XRs of left wrist looked good. She may have small dorsal wrist ganglion, but it was not impressive on exam at her first visit. She has a cardiac defibrillator and is on ELIQUIS.     She was given lidoderm patches at her last visit. I never received her EMG results. She did not start OT.     She continues with diffuse left arm pain/heaviness that varies in severity but is constant. She has difficulty sleeping due to pain. She has increased pain when she tries to grab/lift things with her right hand. Pain is burning in nature and feels deep. She rates her pain as a 6-7 on a scale of 1-10, but it gets to a 10+. She has no neck pain. Has a h/o trigeminal neuralgia. She has numbness and tingling in her left hand. She notes intermittent swelling in wrist and hand.     GI upset with motrin. She is using lidoderm patches and tylenol with minimal improvement. No OT for the right hand. She has a wrist brace that she uses which helps.        Past Medical History:   Diagnosis Date  "   Anemia     Anticoagulant long-term use     Arthritis     Atrial fibrillation     Congestive heart failure     COPD (chronic obstructive pulmonary disease)     Deep vein thrombosis     elevated bilirubin d/t Gilbert's syndrome     confirmed by [facility] genetic testing, evaluated by hepatology    Encounter for blood transfusion     GERD (gastroesophageal reflux disease)     Hypertension     Pheochromocytoma, malignant     Right kidney mass     Sleep apnea     Thyroid disease      Past Surgical History:   Procedure Laterality Date    APPENDECTOMY      CARDIAC DEFIBRILLATOR PLACEMENT Left 12/2016    EYE SURGERY      due to running tears    FRACTURE SURGERY Left     hand 5th digit    HYSTERECTOMY      KNEE SURGERY Left 2016    hematoma    LIVER BIOPSY  10/24/2018    Minimal steatosis, predominantly macrovesicular, 1%, Minimal nonspecific portal inflammation, no fibrosis. No findings on biopsy to explain elevated bilirubin levels. Could be d/t Gilbert's =?- hemolysis    TRANSJUGULAR BIOPSY OF LIVER N/A 10/24/2018    Procedure: BIOPSY, LIVER, TRANSJUGULAR APPROACH;  Surgeon: Carmen Diagnostic Provider;  Location: [facility] OR [address] [place] [address];  Service: Radiology;  Laterality: N/A;     Review of patient's allergies indicates:   Allergen Reactions    Penicillins Hives    Iodinated contrast media Nausea And Vomiting    Percocet [oxycodone-acetaminophen] Other (See Comments)     Nausea, Dizziness, Anxiety.  "I don't like how it makes me feel."   Given Hydromorphone 0.5mg IVP  Without problems.    Diovan hct [valsartan-hydrochlorothiazide] Other (See Comments)     Causes coughing    Irinotecan      Pt has homozygosity for the TA7 promoter variant that places this individual at significantly increased risk for   severe neutropenia (grade 4) when treated with the standard dose of irinotecan (risk approximately 50%).   Other drugs that have been demonstrated to be impacted by homozygosity for the UGT1A1 TA7 promoter " variant include pazopanib, nilotinib, atazanavir, and belinostat. Metabolism of other drugs not listed here may also be impacted by UGT1A1 enzyme activity.       Tramadol Nausea And Vomiting     Social History     Social History Narrative    On disability since      Family History   Problem Relation Age of Onset    Cancer Mother         pancreatic CA early 50's    Heart disease Father          MI in late 50's    Hypertension Father     Heart attack Father     Heart disease Sister     Heart disease Brother     Cirrhosis Brother         alcoholic    Heart disease Sister     Heart disease Brother     Hypertension Brother     Diabetes Brother          Current Outpatient Medications:     albuterol-ipratropium 2.5mg-0.5mg/3mL (DUO-NEB) 0.5 mg-3 mg(2.5 mg base)/3 mL nebulizer solution, Take 1 mL by nebulization every 6 (six) hours as needed for Wheezing or Shortness of Breath., Disp: 1 Box, Rfl: 2    ALPRAZolam (XANAX) 1 MG tablet, Take 1 mg by mouth nightly as needed for Anxiety., Disp: , Rfl:     apixaban (ELIQUIS) 5 mg Tab, Take 1 tablet (5 mg total) by mouth 2 (two) times daily., Disp: 60 tablet, Rfl: 0    atorvastatin (LIPITOR) 40 MG tablet, Take 1 tablet (40 mg total) by mouth once daily., Disp: 90 tablet, Rfl: 3    b complex vitamins tablet, Take 1 tablet by mouth once daily., Disp: , Rfl:     budesonide-formoterol 80-4.5 mcg (SYMBICORT) 80-4.5 mcg/actuation HFAA, Inhale 2 puffs twice a day by inhalation route., Disp: , Rfl:     carvedilol (COREG) 25 MG tablet, Take 1 tablet by mouth 2 (two) times daily., Disp: , Rfl:     cyanocobalamin, vitamin B-12, 50 mcg tablet, Take 5 mcg by mouth once daily., Disp: , Rfl:     diclofenac sodium (VOLTAREN) 1 % Gel, Apply 2 g topically 3 (three) times daily., Disp: 5 Tube, Rfl: 2    ergocalciferol (ERGOCALCIFEROL) 50,000 unit Cap, ergocalciferol (vitamin D2) 50,000 unit capsule  Take 1 capsule every week by oral route., Disp: , Rfl:     fluticasone  propionate (FLONASE) 50 mcg/actuation nasal spray, Spray 2 sprays every day by intranasal route., Disp: , Rfl:     fluticasone-salmeterol diskus inhaler 100-50 mcg, Inhale 1 puff into the lungs 2 (two) times daily. Controller, Disp: , Rfl:     furosemide (LASIX) 80 MG tablet, Take 1 tablet (80 mg total) by mouth 2 (two) times daily. TAKE EXTRA DOSE FOR GAIN OF 2 OR MORE POUND WEIGHT IN 1 DAY OR 5 POUNDS IN 1 WEEK., Disp: 60 tablet, Rfl: 1    isosorbide dinitrate (ISORDIL) 20 MG tablet, Take 1 tablet (20 mg total) by mouth 2 (two) times daily. Please check that this is what you are already taking at home., Disp: , Rfl:     lidocaine (LIDODERM) 5 %, Place 1 patch onto the skin once daily. Keep patch on for 12 hours then remove. One patch daily (12 hours on/12 hours off)., Disp: 30 patch, Rfl: 2    montelukast (SINGULAIR) 10 mg tablet, Take 1 tablet every day by oral route for 30 days., Disp: , Rfl:     NITROSTAT 0.4 mg SL tablet, Take 2.5 tablets (1 mg total) by mouth every 5 (five) minutes as needed for Chest pain. No more than 3 tablets in 15 minutes., Disp: , Rfl:     pantoprazole (PROTONIX) 40 MG tablet, Take 1 tablet by mouth once daily., Disp: , Rfl: 5    potassium chloride (KLOR-CON) 10 MEQ TbSR, Take 1 tablet (10 mEq total) by mouth once daily., Disp: , Rfl:     rOPINIRole 6 mg Tb24, Take 0.5 mg by mouth once daily. , Disp: , Rfl:     sacubitril-valsartan (ENTRESTO)  mg per tablet, Take 1 tablet by mouth 2 (two) times daily., Disp: , Rfl:     silver sulfADIAZINE 1% (SILVADENE) 1 % cream, Silvadene 1 % topical cream  APPLY A 1/16 INCH (1.5 MM) THICK LAYER TO ENTIRE BURN AREA BY TOPICALROUTE 2 TIMES PER DAY for 10 days, Disp: , Rfl:     spironolactone (ALDACTONE) 25 MG tablet, Take 1 tablet (25 mg total) by mouth once daily., Disp: 30 tablet, Rfl: 1    VENTOLIN HFA 90 mcg/actuation inhaler, Inhale 2 puffs into the lungs 2 (two) times daily as needed. , Disp: , Rfl: 11    walker Misc, 1 Device  by Misc.(Non-Drug; Combo Route) route as needed., Disp: 1 each, Rfl: 0  No current facility-administered medications for this visit.     Facility-Administered Medications Ordered in Other Visits:     0.9%  NaCl infusion, , Intravenous, Continuous, Corinna Hayes, NP    vancomycin in dextrose 5 % 1 gram/250 mL IVPB 1,000 mg, 1,000 mg, Intravenous, On Call Procedure, Corinna Hayes NP, 1,000 mg at 05/23/19 0736      Review of Systems   Constitutional: Negative for chills and fever.   Eyes: Negative for discharge and redness.   Respiratory: Negative for cough, shortness of breath and wheezing.    Cardiovascular: Negative for chest pain.   Gastrointestinal: Negative for nausea and vomiting.   Skin: Negative for rash.       OBJECTIVE:      Vital Signs (Most Recent):  There were no vitals filed for this visit.  There is no height or weight on file to calculate BMI.     No VS done due to virtual audio only visit.     Physical Exam:    No physical exam performed due to virtual audio only visit.       ASSESSMENT/PLAN:      54 y.o. yo female with persistent diffuse left arm burning pain/heaviness with intermittent swelling in hand/wrist. No neck pain noted. I never received her EMG results. She may have wrist ganglion and may have some component of carpal tunnel syndrome as well. With diffuse pain/burning, she also may have a cervical/radicular component. Treatment options reviewed with patient and following plan made:     - Okay to continue lidoderm patches and prn OTC tylenol. Take with food.   - I do not have a hand surgeon here in [place]. Recommend she follow up with [facility] for further treatment for left arm pain/numbness. Referral mailed to her to see ortho and/or neurosurgery.   - Continue to recommend OT for her left arm pain, but not able to do currently due to covid concerns. If this changes, she can contact me for new orders.     She will follow up with me prn. Referral mailed to her as above.

## 2020-04-27 PROBLEM — Z74.09 IMPAIRED FUNCTIONAL MOBILITY AND ACTIVITY TOLERANCE: Status: RESOLVED | Noted: 2018-01-23 | Resolved: 2020-04-27

## 2020-05-11 ENCOUNTER — CLINICAL SUPPORT (OUTPATIENT)
Dept: CARDIOLOGY | Facility: HOSPITAL | Age: 55
End: 2020-05-11
Payer: MEDICAID

## 2020-05-11 DIAGNOSIS — Z95.810 PRESENCE OF AUTOMATIC (IMPLANTABLE) CARDIAC DEFIBRILLATOR: ICD-10-CM

## 2020-05-11 PROCEDURE — 93296 REM INTERROG EVL PM/IDS: CPT | Performed by: INTERNAL MEDICINE

## 2020-05-11 PROCEDURE — 93295 CARDIAC DEVICE CHECK - REMOTE: ICD-10-PCS | Mod: ,,, | Performed by: INTERNAL MEDICINE

## 2020-05-11 PROCEDURE — 93295 DEV INTERROG REMOTE 1/2/MLT: CPT | Mod: ,,, | Performed by: INTERNAL MEDICINE

## 2020-05-12 ENCOUNTER — TELEPHONE (OUTPATIENT)
Dept: ELECTROPHYSIOLOGY | Facility: CLINIC | Age: 55
End: 2020-05-12

## 2020-05-12 NOTE — TELEPHONE ENCOUNTER
Spoke with pt to inform her that she is on our waitlist and we will contact her once we have available appts.  ----- Message from Delmis Gray MA sent at 5/5/2020  4:14 PM CDT -----  Contact: Self      ----- Message -----  From: Patience Farnsworth  Sent: 5/5/2020   9:49 AM CDT  To: Alberto Freed Staff    Pt calling to reschedule cancel appt.  Thanks     [phone]

## 2020-05-25 ENCOUNTER — TELEPHONE (OUTPATIENT)
Dept: ORTHOPEDICS | Facility: CLINIC | Age: 55
End: 2020-05-25

## 2020-05-25 NOTE — TELEPHONE ENCOUNTER
----- Message from Demetria Márquez PA-C sent at 5/25/2020  7:41 AM CDT -----  She has an appt with me next week for hand/arm pain. Last time I saw her I recommended she f/u with [facility] since we do not have a hand surgeon here.     Please let her know that I am happy to see her next week, but as we still don't have a hand surgeon I am not sure what I have to offer.     I recommend she f/u with [facility].     Thanks.

## 2020-06-10 ENCOUNTER — TELEPHONE (OUTPATIENT)
Dept: ORTHOPEDICS | Facility: CLINIC | Age: 55
End: 2020-06-10

## 2020-06-10 NOTE — TELEPHONE ENCOUNTER
----- Message from Gypsy Jean Baptiste sent at 6/10/2020  2:08 PM CDT -----  Contact: Self [phone]  Patient would like to speak with you about her referral Please advise

## 2020-06-10 NOTE — TELEPHONE ENCOUNTER
Patient needed her referral faxed to [facility], she gave me a number and I faxed them for her. Faxed to [phone]

## 2020-06-30 ENCOUNTER — TELEPHONE (OUTPATIENT)
Dept: ELECTROPHYSIOLOGY | Facility: CLINIC | Age: 55
End: 2020-06-30

## 2020-06-30 NOTE — TELEPHONE ENCOUNTER
L//m for pt with appt times  ----- Message from Kristine Chaudhary MA sent at 6/30/2020  3:00 PM CDT -----  Contact: self  She  is calling to make an appt.with / [facility] Dominguez. Pt has medicaid. Has a device ck on 8/9 remote. Please call [phone].

## 2020-07-06 ENCOUNTER — TELEPHONE (OUTPATIENT)
Dept: NEUROLOGY | Facility: HOSPITAL | Age: 55
End: 2020-07-06

## 2020-07-06 ENCOUNTER — HOSPITAL ENCOUNTER (OUTPATIENT)
Dept: RADIOLOGY | Facility: HOSPITAL | Age: 55
Discharge: HOME | End: 2020-07-06
Attending: SURGERY
Payer: MEDICAID

## 2020-07-06 DIAGNOSIS — E27.8 ADRENAL MASS: ICD-10-CM

## 2020-07-06 DIAGNOSIS — E27.8 ADRENAL INCIDENTALOMA: ICD-10-CM

## 2020-07-06 DIAGNOSIS — R21 RASH: Primary | ICD-10-CM

## 2020-07-06 RX ORDER — PREDNISONE 50 MG/1
50 TABLET ORAL SEE ADMIN INSTRUCTIONS
Qty: 3 TABLET | Refills: 0 | Status: SHIPPED | OUTPATIENT
Start: 2020-07-06 | End: 2020-07-17

## 2020-07-06 NOTE — TELEPHONE ENCOUNTER
Radiology called this pt was not prepped for her Ct scan with IV contrast. Told the radiology to tell the pt to call office and we will reschedule the CT scan and order the prep for this scan

## 2020-07-06 NOTE — TELEPHONE ENCOUNTER
Called pt and informed her that the CT of the Abdomen is rescheduled for 7/9/20 at 800 am. Gave her instructions for the prep meds. The Prednisone  At 700 pm on 7/8/20 and at 0100 am on 7/9/20 and at 700 am on 7/9/20 with 50 mg of Benadryl also at 700 am 7/9/20. Pt verbalized instruction

## 2020-07-17 ENCOUNTER — OFFICE VISIT (OUTPATIENT)
Dept: TRANSPLANT | Facility: CLINIC | Age: 55
End: 2020-07-17
Payer: MEDICAID

## 2020-07-17 VITALS
DIASTOLIC BLOOD PRESSURE: 88 MMHG | BODY MASS INDEX: 37.23 KG/M2 | WEIGHT: 231.69 LBS | SYSTOLIC BLOOD PRESSURE: 167 MMHG | HEIGHT: 66 IN | HEART RATE: 89 BPM

## 2020-07-17 DIAGNOSIS — Z95.810 ICD (IMPLANTABLE CARDIOVERTER-DEFIBRILLATOR) IN PLACE: Chronic | ICD-10-CM

## 2020-07-17 DIAGNOSIS — I50.42 CHRONIC COMBINED SYSTOLIC AND DIASTOLIC HEART FAILURE: ICD-10-CM

## 2020-07-17 DIAGNOSIS — I48.0 PAROXYSMAL ATRIAL FIBRILLATION: Chronic | ICD-10-CM

## 2020-07-17 DIAGNOSIS — I10 ESSENTIAL HYPERTENSION: Chronic | ICD-10-CM

## 2020-07-17 DIAGNOSIS — J41.0 SIMPLE CHRONIC BRONCHITIS: Primary | Chronic | ICD-10-CM

## 2020-07-17 DIAGNOSIS — I47.10 PAROXYSMAL SUPRAVENTRICULAR TACHYCARDIA: Chronic | ICD-10-CM

## 2020-07-17 DIAGNOSIS — N18.30 CHRONIC KIDNEY DISEASE, STAGE III (MODERATE): Chronic | ICD-10-CM

## 2020-07-17 DIAGNOSIS — G47.33 OSA (OBSTRUCTIVE SLEEP APNEA): Chronic | ICD-10-CM

## 2020-07-17 DIAGNOSIS — I42.8 NICM (NONISCHEMIC CARDIOMYOPATHY): Chronic | ICD-10-CM

## 2020-07-17 PROCEDURE — 99999 PR PBB SHADOW E&M-EST. PATIENT-LVL IV: CPT | Mod: PBBFAC,,, | Performed by: INTERNAL MEDICINE

## 2020-07-17 PROCEDURE — 99214 OFFICE O/P EST MOD 30 MIN: CPT | Mod: PBBFAC | Performed by: INTERNAL MEDICINE

## 2020-07-17 PROCEDURE — 99215 PR OFFICE/OUTPT VISIT, EST, LEVL V, 40-54 MIN: ICD-10-PCS | Mod: S$PBB,,, | Performed by: INTERNAL MEDICINE

## 2020-07-17 PROCEDURE — 99215 OFFICE O/P EST HI 40 MIN: CPT | Mod: S$PBB,,, | Performed by: INTERNAL MEDICINE

## 2020-07-17 PROCEDURE — 99999 PR PBB SHADOW E&M-EST. PATIENT-LVL IV: ICD-10-PCS | Mod: PBBFAC,,, | Performed by: INTERNAL MEDICINE

## 2020-07-17 NOTE — LETTER
July 17, 2020        Andrea Pacheco  [address]  [address]  [place] [address]  Phone: [phone]  Fax: [phone]             [facility] [address] [place] [address]  Phone: [phone]   Patient: Hafsa Hawley   MR Number: [id number]   YOB: 1965   Date of Visit: 7/17/2020       Dear Dr. Andrea Pacheco    Thank you for referring Hafsa Hawley to me for evaluation. Attached you will find relevant portions of my assessment and plan of care.    If you have questions, please do not hesitate to call me. I look forward to following Hafsa Hawley along with you.    Sincerely,    Armando Garcia MD    Enclosure    If you would like to receive this communication electronically, please contact [email or web address] or [phone] to request [employer] access.    [employer] is a tool which provides read-only access to select patient information with whom you have a relationship. Its easy to use and provides real time access to review your patients record including encounter summaries, notes, results, and demographic information.    If you feel you have received this communication in error or would no longer like to receive these types of communications, please e-mail [email or web address]

## 2020-07-17 NOTE — PROGRESS NOTES
Subjective:     HPI:  Ms. Hawley is a 54 y.o. year old Black or  female who has presents for one month follow up.Seen by Dr Lorenzo to be evaluated as a potential heart transplant recipient (referred by Junior / Dana)   At her initial visit in July 2018, she was told to complete her pheochromocytoma. She was seen by [facility] who feel she is unlikely to have pheochromocytoma  They recommend that we can pursue heart transplant workup.      She is here for follow up LAGUERRE FC II NYHA    Major concern is fluid retention in lower extremities    · Mild-Moderately decreased left ventricular systolic function. The estimated ejection fraction is 35-40%  · Significantly thickened myocardium, moderate concentric left ventricular hypertrophy.  · Grade I (mild) left ventricular diastolic dysfunction consistent with impaired relaxation.  · Mild left ventricular enlargement.  · Moderate left atrial enlargement.  · Normal right ventricular systolic function.  · Mild mitral regurgitation.  · Trivial Pericardial effusion.  · Intermediate central venous pressure (8 mm Hg).  · The estimated PA systolic pressure is 43 mm Hg      Patient was placed on I.V Lasix and this improved SOB and currently home lasix increased to 80 mg BID. Small dose aldactone added too. Patient asked to take extra dose lasix for gain of weight as advised before. She did not take extra dose for past 2 months as thought weight gain was sec to change in diet habits and her gaining weight and not fluid. Also asked to be compliant with low salt diet(was not for past 2 months as she was with family or went out to eat). A flutter on admit sec to acute on chronic Combined HF. Was on Amiodarone drip for only few hours and then had sinus bradycardia. Currently in NSR and rate controlled with home dose Coreg. Cont Eliquis. She is now being d/beronica home in a stable condition and cont to f/u with Cardiology as out patient.      PREVIOUS  HISTORY    Patient most recently admitted at [facility] with ADHF, BNP significantly elevated at 2573, had to be diuresed, additionally had episode of VT x 1, and chest pain overnight. Had elevated troponin as well at around .6, felt to be due to ADHF and tachycardia. Bili was elevated as well, bili all the way up to 5.6. Last visit was sent to hepatology and they plan to do US  elastography but felt ok to proceed with LVAD/OHT workup. Last visit she was to increase isordil to 2 tablets TID but did not do this. Today, feels less SOB. Has some mild musculoskeletal pains she feels is related to her AICD.      TTE 08/31/18:    1 - Severely depressed left ventricular systolic function (EF 20-25%).     2 - Impaired LV relaxation, elevated LAP (grade 2 diastolic dysfunction).     3 - Pulmonary hypertension. The estimated PA systolic pressure is 43 mmHg.     4 - Mild mitral regurgitation.     5 - Increased central venous pressure.     6 - Moderate pulmonic regurgitation.     7- LVH     Six minute walk 06/21/2018---------Distance: 292.61 meters (960 feet)     O2 Sat % Supplemental Oxygen Heart Rate Blood Pressure Haseeb   Scale   Pre-exercise  (Resting) 98 % Room Air 70 bpm 173/97 mmHg 0.5   During Exercise 95 % Room Air 80 bpm 143/100 mmHg 5-6   Post-exercise  (Recovery) 98 % Room Air  68 bpm 141/93 mmHg       FINAL PATHOLOGIC DIAGNOSIS  Liver, random, biopsy:  - Minimal steatosis, predominantly macrovesicular, 1%.  - Minimal nonspecific portal inflammation.  - No significant fibrosis seen.  - PAS-D and copper pending, results will be issued in an addendum.  - See comment.  COMMENT: The biopsy is adequate for evaluation. The patient's history of elevated total bilirubin is noted and  select information from the electronic medical record is reviewed. The biopsy is relatively unremarkable with  minimal steatosis and nonspecific portal inflammation. There is no definitive steatohepatitis. There is no  cholestasis or ductular  reaction seen. The bile ducts are unremarkable. There is no significant plasma cell  population seen. There are no granulomas seen. There is no increased iron on iron stain. There is no increased  fibrosis on trichrome stain. There is no clear etiology for elevated bilirubin seen on this biopsy. Appropriate positive  controls are examined.  Diagnosed      Past Medical History:   Diagnosis Date    Anemia     Anticoagulant long-term use     Arthritis     Atrial fibrillation     Congestive heart failure     COPD (chronic obstructive pulmonary disease)     Deep vein thrombosis     elevated bilirubin d/t Gilbert's syndrome     confirmed by [facility] genetic testing, evaluated by hepatology    Encounter for blood transfusion     GERD (gastroesophageal reflux disease)     Hypertension     Pheochromocytoma, malignant     Right kidney mass     Sleep apnea     Thyroid disease      Past Surgical History:   Procedure Laterality Date    APPENDECTOMY      CARDIAC DEFIBRILLATOR PLACEMENT Left 2016    EYE SURGERY      due to running tears    FRACTURE SURGERY Left     hand 5th digit    HYSTERECTOMY      KNEE SURGERY Left 2016    hematoma    LIVER BIOPSY  10/24/2018    Minimal steatosis, predominantly macrovesicular, 1%, Minimal nonspecific portal inflammation, no fibrosis. No findings on biopsy to explain elevated bilirubin levels. Could be d/t Gilbert's =?- hemolysis    TRANSJUGULAR BIOPSY OF LIVER N/A 10/24/2018    Procedure: BIOPSY, LIVER, TRANSJUGULAR APPROACH;  Surgeon: [facility];  Location: [facility] OR [address] [place] [address];  Service: Radiology;  Laterality: N/A;     OB History        2    Para   2    Term   2            AB        Living           SAB        TAB        Ectopic        Multiple        Live Births                   Review of Systems   Constitution: Negative for chills, decreased appetite, diaphoresis, fever and weight gain.   Cardiovascular: Positive for dyspnea on exertion.  "Negative for chest pain, claudication, cyanosis and paroxysmal nocturnal dyspnea.   Respiratory: Negative for cough, hemoptysis, shortness of breath, sleep disturbances due to breathing and snoring.    Skin: Negative for nail changes.   Gastrointestinal: Negative for bloating, nausea and vomiting.   Neurological: Negative for weakness.   Psychiatric/Behavioral: Negative for depression.       Objective:   Blood pressure (!) 167/88, pulse 89, height 5' 6" (1.676 m), weight 105.1 kg (231 lb 11.3 oz).body mass index is 37.4 kg/m².  Physical Exam   Constitutional: She appears well-developed.   HENT:   Head: Normocephalic.   Eyes: Pupils are equal, round, and reactive to light.   Neck: Normal range of motion. No JVD present.   Cardiovascular: Normal rate. Exam reveals no friction rub.   No murmur heard.  Pulmonary/Chest: Effort normal. No respiratory distress.   Abdominal: Soft. She exhibits no distension. There is no abdominal tenderness.   Musculoskeletal: Normal range of motion.         General: Edema (+3) present.   Neurological: She is alert. No cranial nerve deficit.   Skin: Skin is warm. She is not diaphoretic. No erythema.       Labs:    Chemistry        Component Value Date/Time     07/03/2020 1630    K 4.2 07/03/2020 1630     07/03/2020 1630    CO2 31 (H) 07/03/2020 1630    BUN 26 (H) 07/03/2020 1630    CREATININE 1.24 07/03/2020 1630     (H) 07/03/2020 1630        Component Value Date/Time    CALCIUM 9.2 07/03/2020 1630    ALKPHOS 74 07/03/2020 1630    AST 33 07/03/2020 1630    ALT 23 07/03/2020 1630    BILITOT 1.2 (H) 07/03/2020 1630    ESTGFRAFRICA 56.9 (A) 07/03/2020 1630    EGFRNONAA 49.4 (A) 07/03/2020 1630          Magnesium   Date Value Ref Range Status   07/03/2020 1.6 1.6 - 2.6 mg/dL Final     Lab Results   Component Value Date    WBC 5.87 07/03/2020    HGB 9.4 (L) 07/03/2020    HCT 31.9 (L) 07/03/2020     07/03/2020     Lab Results   Component Value Date    INR 1.1 06/21/2019 "    INR 1.2 05/16/2019    INR 1.3 (H) 04/09/2019     BNP   Date Value Ref Range Status   08/07/2019 219 (H) 0 - 99 pg/mL Final     Comment:     Values of less than 100 pg/ml are consistent with non-CHF populations.   02/14/2019 301 (H) 0 - 99 pg/mL Final     Comment:     Values of less than 100 pg/ml are consistent with non-CHF populations.   10/21/2018 924 (H) 0 - 99 pg/mL Final     Comment:     Values of less than 100 pg/ml are consistent with non-CHF populations.     LD   Date Value Ref Range Status   06/11/2018 308 (H) 110 - 260 U/L Final     Comment:     Results are increased in hemolyzed samples.   08/23/2016 280 (H) 110 - 260 U/L Final     Comment:     Results are increased in hemolyzed samples.     No results found for this or any previous visit.  No results found for this or any previous visit.    Assessment:      1. Simple chronic bronchitis    2. Paroxysmal supraventricular tachycardia    3. Paroxysmal atrial fibrillation    4. NICM (nonischemic cardiomyopathy)    5. ICD (implantable cardioverter-defibrillator) in place    6. Essential hypertension    7. Chronic combined systolic and diastolic heart failure    8. Chronic kidney disease, stage III (moderate)    9. MELISSA (obstructive sleep apnea)        Plan:   HFrEF FC II NYHA on entresto 97/103 and reverse remodeling euvolemic        RTC 3 months      Patient is now NYHA II  Recommend 2 gram sodium restriction and 1500cc fluid restriction.  Encourage physical activity with graded exercise program.  Requested patient to weigh themselves daily, and to notify us if their weight increases by more than 3 lbs in 1 day or 5 lbs in 1 week.     Listed for transplant: No

## 2020-08-09 ENCOUNTER — CLINICAL SUPPORT (OUTPATIENT)
Dept: CARDIOLOGY | Facility: HOSPITAL | Age: 55
End: 2020-08-09
Attending: INTERNAL MEDICINE
Payer: MEDICAID

## 2020-08-09 DIAGNOSIS — Z95.810 CARDIAC DEFIBRILLATOR IN PLACE: ICD-10-CM

## 2020-08-09 DIAGNOSIS — I42.5 OTHER RESTRICTIVE CARDIOMYOPATHY: ICD-10-CM

## 2020-08-09 PROCEDURE — 93296 REM INTERROG EVL PM/IDS: CPT | Performed by: INTERNAL MEDICINE

## 2020-08-09 PROCEDURE — 93295 CARDIAC DEVICE CHECK - REMOTE: ICD-10-PCS | Mod: ,,, | Performed by: INTERNAL MEDICINE

## 2020-08-09 PROCEDURE — 93295 DEV INTERROG REMOTE 1/2/MLT: CPT | Mod: ,,, | Performed by: INTERNAL MEDICINE

## 2020-08-12 PROBLEM — U07.1 COVID-19 VIRUS INFECTION: Status: ACTIVE | Noted: 2020-08-12

## 2020-08-13 PROBLEM — D72.819 LEUKOPENIA: Status: ACTIVE | Noted: 2020-08-13

## 2020-08-13 PROBLEM — R21 RASH: Status: RESOLVED | Noted: 2019-03-29 | Resolved: 2020-08-13

## 2020-08-13 PROBLEM — E11.65 TYPE 2 DIABETES MELLITUS WITH HYPERGLYCEMIA, WITHOUT LONG-TERM CURRENT USE OF INSULIN: Status: ACTIVE | Noted: 2020-08-13

## 2020-08-17 ENCOUNTER — NURSE TRIAGE (OUTPATIENT)
Dept: ADMINISTRATIVE | Facility: CLINIC | Age: 55
End: 2020-08-17

## 2020-08-17 NOTE — TELEPHONE ENCOUNTER
Pt sounds very weak, has severe SOB, told to call 911 now, states she will.  Called ER to give report.  Educated Pt to wear mask at all times while in ambulance & at hospital    Reason for Disposition   SEVERE or constant chest pain or pressure (Exception: mild central chest pain, present only when coughing)     With cough mainly   MILD difficulty breathing (e.g., minimal/no SOB at rest, SOB with walking, pulse <100)   Chest pain   Patient sounds very sick or weak to the triager   [1] COVID-19 infection suspected by caller or triager AND [2] mild symptoms (cough, fever, or others) AND [3] no complications or SOB   [1] COVID-19 diagnosed by positive lab test AND [2] mild symptoms (e.g., cough, fever, others) AND [3] no complications or SOB   [1] COVID-19 diagnosed by HCP (doctor, NP or PA) AND [2] mild symptoms (e.g., cough, fever, others) AND [3] no complications or SOB    Additional Information   Negative: Difficult to awaken or acting confused (e.g., disoriented, slurred speech)   Negative: Bluish (or gray) lips or face now   Negative: Shock suspected (e.g., cold/pale/clammy skin, too weak to stand, low BP, rapid pulse)   Negative: Sounds like a life-threatening emergency to the triager   Negative: [1] COVID-19 exposure AND [2] NO symptoms   Negative: COVID-19 and Breastfeeding, questions about   Negative: [1] Adult with possible COVID-19 symptoms AND [2] triager concerned about severity of symptoms or other causes   Negative: SEVERE difficulty breathing (e.g., struggling for each breath, speaks in single words)   Negative: MODERATE difficulty breathing (e.g., speaks in phrases, SOB even at rest, pulse 100-120)   Negative: Fever > 103 F (39.4 C)   Negative: [1] Fever > 101 F (38.3 C) AND [2] age > 60   Negative: [1] Fever > 100.0 F (37.8 C) AND [2] bedridden (e.g., nursing home patient, CVA, chronic illness, recovering from surgery)   Negative: HIGH RISK patient (e.g., age > 64 years, diabetes,  heart or lung disease, weak immune system)   Negative: [1] Fever returns after gone for over 24 hours AND [2] symptoms worse or not improved   Negative: Fever present > 3 days (72 hours)   Negative: [1] Continuous (nonstop) coughing interferes with work or school AND [2] no improvement using cough treatment per protocol   Negative: Cough present > 3 weeks   Negative: COVID-19 Home Isolation, questions about   Negative: COVID-19 Testing, questions about   Negative: COVID-19 Prevention and Healthy Living, questions about   Negative: COVID-19, questions about    Protocols used: CORONAVIRUS (COVID-19) DIAGNOSED OR [id number]

## 2020-08-17 NOTE — TELEPHONE ENCOUNTER
Reason for Disposition   [1] Follow-up call to recent contact AND [2] information only call, no triage required    Protocols used: ST INFORMATION ONLY CALL-A-AH    Pt stated she spoke with Nurse Lourdes and was told to call 911 because she said she was sob. Pt stated she is not sob, does not have chest pain, and was just weak and tired because she just got off from work. AGUSTIN Cortes at house with Pt now and on speaker phone stated Pt does not need to go to the hospital. Pt advised to call back if she develops chest pain, sob, or if she becomes worse. Pt verbalized understanding.

## 2020-08-18 ENCOUNTER — PATIENT OUTREACH (OUTPATIENT)
Dept: ADMINISTRATIVE | Facility: CLINIC | Age: 55
End: 2020-08-18

## 2020-08-18 PROBLEM — D72.819 LEUKOPENIA: Status: RESOLVED | Noted: 2020-08-13 | Resolved: 2020-08-18

## 2020-08-18 NOTE — PATIENT INSTRUCTIONS
Instructions for Patients with Confirmed or Suspected COVID-19    If you are awaiting your test result, you will either be called or it will be released to the patient portal.  If you have any questions about your test, please visit [email or web address] or call our COVID-19 information line at [phone].      Instructions for non-hospitalized or discharged patients with confirmed or suspected COVID-19:       Stay home except to get medical care.    Separate yourself from other people and animals in your home.    Call ahead before visiting your doctor.    Wear a face mask.    Cover your coughs and sneezes.    Clean your hands often.    Avoid sharing personal household items.    Clean all high-touch surfaces every day.    Monitor your symptoms. Seek prompt medical attention if your illness is worsening (e.g., difficulty breathing). Before seeking care, call your healthcare provider.    If you have a medical emergency and must call 911, notify the dispatcher that you have or are being evaluated for COVID-19. If possible, put on a face mask before emergency medical services arrive.    Use the following symptom-based strategy to return to normal activity following a suspected or confirmed case of COVID-19. Continue isolation until:   o At least 3 days (72 hours) have passed since recovery defined as resolution of fever without the use of fever-reducing medications and improvement in respiratory symptoms (e.g. cough, shortness of breath), and   o At least 10 days have passed since the first positive test.       As one of the next steps, you will receive a call or text from the [place] Department of Health ([facility]) COVID-19 Tracing Team. See the contact information below so you know not to ignore the health departments call. It is important that you contact them back immediately so they can help.     Contact Tracer Number:  [phone]  Caller ID for most carriers: [place] Dept [facility]    What is  contact tracing?   Contact tracing is a process that helps identify everyone who has been in close contact with an infected person. Contact tracers let those people know they may have been exposed and guide them on next steps. Confidentiality is important for everyone; no one will be told who may have exposed them to the virus.   Your involvement is important. The more we know about where and how this virus is spreading, the better chance we have at stopping it from spreading further.  What does exposure mean?   Exposure means you have been within 6 feet for more than 15 minutes with a person who has or had COVID-19.  What kind of questions do the contact tracers ask?   A contact tracer will confirm your basic contact information including name, address, phone number, and next of kin, as well as asking about any symptoms you may have had. Theyll also ask you how you think you may have gotten sick, such as places where you may have been exposed to the virus, and people you were with. Those names will never be shared with anyone outside of that call, and will only be used to help trace and stop the spread of the virus.   I have privacy concerns. How will the state use my information?   Your privacy about your health is important. All calls are completed using call centers that use the appropriate health privacy protection measures (HIPAA compliance), meaning that your patient information is safe. No one will ever ask you any questions related to immigration status. Your health comes first.   Do I have to participate?   You do not have to participate, but we strongly encourage you to. Contact tracing can help us catch and control new outbreaks as theyre developing to keep your friends and family safe.   What if I dont hear from anyone?   If you dont receive a call within 24 hours, you can call the number above right away to inquire about your status. That line is open from 8:00 am - 8:00 p.m., 7 days a  week.  Contact tracing saves lives! Together, we have the power to beat this virus and keep our loved ones and neighbors safe.       Instructions for household members, intimate partners and caregivers in a non-healthcare setting of a patient with confirmed or suspected COVID-19:         Close contacts should monitor their health and call their healthcare provider right away if they develop symptoms suggestive of COVID-19 (e.g., fever, cough, shortness of breath).    Stay home except to get medical care. Separate yourself from other people and animals in the home.   Monitor the patients symptoms. If the patient is getting sicker, call his or her healthcare provider. If the patient has a medical emergency and you need to call 911, notify the dispatch personnel that the patient has or is being evaluated for COVID-19.    Wear a facemask when around other people such as sharing a room or vehicle and before entering a healthcare provider's office.   Cover coughs and sneezes with a tissue. Throw used tissues in a lined trash can immediately and wash hands.   Clean hands often with soap and water for at least 20 seconds or with an alcohol-based hand , rubbing hands together until they feel dry. Avoid touching your eyes, nose, and mouth with unwashed hands.   Clean all high-touch; surfaces every day, including counters, tabletops, doorknobs, bathroom fixtures, toilets, phones, keyboards, tablets, bedside tables, etc. Use a household cleaning spray or wipe according to label instructions.   Avoid sharing personal household items such as dishes, drinking glasses, cups, towels, bedding, etc. After these items are used, they should be washed thoroughly with soap and water.   Continue isolation until:   At least 3 days (72 hours) have passed since recovery defined as resolution of fever without the use of fever-reducing medications and improvement in respiratory symptoms (e.g. cough, shortness of breath),  and    At least 10 days have passed since the patients first positive test.    https://www.cdc.gov/coronavirus/2019-ncov/your-health/index.htm    Shortness of Breath (Dyspnea)  Shortness of breath is the feeling that you can't catch your breath or get enough air. It is also known as dyspnea.  Dyspnea can be caused by many different conditions. They include:  · Acute asthma attack.  · Worsening of chronic lung diseases such as chronic bronchitis and emphysema.  · Heart failure. This is when weak heart muscle allows extra fluid to collect in the lungs.  · Panic attacks or anxiety. Fear can cause rapid breathing (hyperventilation).  · Pneumonia, or an infection in the lung tissue.  · Exposure to toxic substances, fumes, smoke, or certain medicines.  · Blood clot in the lung (pulmonary embolism). This is often from a piece of blood clot in a deep vein of the leg (deep vein thrombosis) that breaks off and travels to the lungs.  · Heart attack or heart-related chest pain (angina).  · Anemia.  · Collapsed lung (pneumothorax).  · Dehydration.  · Pregnancy.  Based on your visit today, the exact cause of your shortness of breath is not certain. Your tests dont show any of the serious causes of dyspnea. You may need other tests to find out if you have a serious problem. Its important to watch for any new symptoms or symptoms that get worse. Follow up with your healthcare provider as directed.  Home care  Follow these tips to take care of yourself at home:  · When your symptoms are better, go back to your usual activities.  · If you smoke, you should stop. Join a quit-smoking program or ask your healthcare provider for help.  · Eat a healthy diet and get plenty of sleep.  · Get regular exercise. Talk with your healthcare provider before starting to exercise, especially if you have other medical problems.  · Cut down on the amount of caffeine and stimulants you consume.  Follow-up care  Follow up with your healthcare  provider, or as advised.  If tests were done, you will be told if your treatment needs to be changed. You can call as directed for the results.  (Note: If an X-ray was taken, a specialist will review it. You will be notified of any new findings that may affect your care.)  Call 911 or get immediate medical care  Shortness of breath may be a sign of a serious medical problem. For example, it may be a problem with your heart or lungs. Call 911 if you have worsening shortness of breath or trouble breathing, especially with any of the symptoms below:  · You are confused or its difficult to wake you.  · You faint or lose consciousness.  · You have a fast heartbeat, or your heartbeat is irregular.  · You are coughing up blood.  · You have pain in your chest, arm, shoulder, neck, or upper back.  · You break out in a sweat.  When to seek medical advice  Call your healthcare provider right away if any of these occur:  · Slight shortness of breath or wheezing  · Redness, pain or swelling in your leg, arm, or other body area  · Swelling in both legs or ankles  · Fast weight gain  · Dizziness or weakness  · Fever of 100.4ºF (38ºC) or higher, or as directed by your healthcare provider  Date Last Reviewed: 9/13/2015  © 0005-6922 [employer] [address] [place] [address], [place] [address]. All rights reserved. This information is not intended as a substitute for professional medical care. Always follow your healthcare professional's instructions.

## 2020-08-19 ENCOUNTER — NURSE TRIAGE (OUTPATIENT)
Dept: ADMINISTRATIVE | Facility: CLINIC | Age: 55
End: 2020-08-19

## 2020-08-19 ENCOUNTER — TELEPHONE (OUTPATIENT)
Dept: ELECTROPHYSIOLOGY | Facility: CLINIC | Age: 55
End: 2020-08-19

## 2020-08-19 NOTE — TELEPHONE ENCOUNTER
Pt calling and she said that she was D/c 'd on SUn from hospital and has a dr visit tomorrow she has a long hx of medical problems and she woke up this am and BS was 450. Pt siad that she is a little lightheaded she denies anyone going over a diabetic diet with her and she was sent home with new onset DM type 2. Pt asked to recheck  BS and now its 540 pt told to go to ED and asked if she had someone to bring her and she said yes. Pt instructed to go now. After triaged is when she rechecked sugar.     Reason for Disposition   Blood glucose > 400 mg/dl (22 mmol/l)    Additional Information   Negative: Unconscious or difficult to awaken   Negative: Acting confused (e.g., disoriented, slurred speech)   Negative: Very weak (can't stand)   Negative: Sounds like a life-threatening emergency to the triager   Negative: Vomiting and signs of dehydration (e.g., very dry mouth, lightheaded, etc.)   Negative: Blood glucose > 240 mg/dl (13 mmol/l) and rapid breathing   Negative: Blood glucose > 500 mg/dl (27.5 mmol/l)   Negative: Blood glucose > 240 mg/dl (13 mmol/l) AND urine ketones moderate-large (or more than 1+)   Negative: Blood glucose > 240 mg/dl (13 mmol/l) and blood ketones > 1.5 mmol/l   Negative: Blood glucose > 240 mg/dl (13 mmol/l) AND vomiting AND unable to check for ketones (in blood or urine)   Negative: Vomiting lasting > 4 hours   Negative: Patient sounds very sick or weak to the triager   Negative: Fever > 100.5 F (38.1 C)   Negative: Caller has URGENT medication or insulin pump question and triager unable to answer question    Protocols used: DIABETES - HIGH BLOOD SUGAR-A-OH

## 2020-08-19 NOTE — TELEPHONE ENCOUNTER
Left voice message to remind patient of upcoming appointments on 8/26/2020 with ekg and to see SABINE Mcgrath

## 2020-08-20 ENCOUNTER — OFFICE VISIT (OUTPATIENT)
Dept: FAMILY MEDICINE | Facility: HOSPITAL | Age: 55
End: 2020-08-20
Attending: FAMILY MEDICINE
Payer: MEDICAID

## 2020-08-20 ENCOUNTER — TELEPHONE (OUTPATIENT)
Dept: ELECTROPHYSIOLOGY | Facility: CLINIC | Age: 55
End: 2020-08-20

## 2020-08-20 DIAGNOSIS — I50.42 CHRONIC COMBINED SYSTOLIC AND DIASTOLIC HEART FAILURE: ICD-10-CM

## 2020-08-20 DIAGNOSIS — I10 ESSENTIAL HYPERTENSION: ICD-10-CM

## 2020-08-20 DIAGNOSIS — J44.9 CHRONIC OBSTRUCTIVE PULMONARY DISEASE, UNSPECIFIED COPD TYPE: ICD-10-CM

## 2020-08-20 DIAGNOSIS — E11.9 TYPE 2 DIABETES MELLITUS WITHOUT COMPLICATION, WITHOUT LONG-TERM CURRENT USE OF INSULIN: Primary | ICD-10-CM

## 2020-08-20 DIAGNOSIS — G47.33 OSA (OBSTRUCTIVE SLEEP APNEA): ICD-10-CM

## 2020-08-20 RX ORDER — METFORMIN HYDROCHLORIDE 500 MG/1
TABLET ORAL
Qty: 42 TABLET | Refills: 0 | Status: SHIPPED | OUTPATIENT
Start: 2020-08-20 | End: 2020-09-23

## 2020-08-21 NOTE — PROGRESS NOTES
Subjective:       Patient ID: Hafsa Hawley is a 54 y.o. female.    Chief Complaint: Diabetes    Patient is a 54 year old female with PMHx CHF (last EF 35-40% 9/2019), COPD, MELISSA, HTN, DM, CKD III who presented as a telephone visit for hospital followup and recent diagnosis of diabetes. Patient recently admitted to [facility] for COVID infection. Patient had subjective cough, fever, dyspnea for 4 days prior to presentation. Patient admitted for about 4 days during which she was weaned off of oxygen and afebrile so she was deemed stable for discharge. While admitted, she was found to have an A1C of 7.0 which was a new diagnosis of diabetes. Patient sent home on glimepiride 1mg daily.     Since being discharged, the patient reports that her blood sugar's have been consistently in the 200s. Patient reports that she has not been eating a good diet to control her blood sugars although she also reports that she doesn't know what would be a healthy diabetic diet. Patient also reports some continued shortness of breath. This shortness of breath predated her COVID infection. Patient reports that she has had very significant edema to the bilateral lower extremities as well as swelling in the upper extremities and in the face. Patient denies any difficulty swallowing or other symptoms that would be concerning for possible angioedema or red flag symptoms for any obstructions. Patient reports that she follows closely with her cardiologist who recently increased her lasix and added PRN metolazone to aid in diuresis. Patient feels that she has not sufficiently diuresed. Patient reports orthopnea and PND. Patient has a hx of MELISSA and Pulmonary HTN for which she should be on CPAP but she does not use one as she finds it uncomfortable although she does endorse shortness of breath when sleeping. Patient denies any other complications at this time besides a slight continued cough that is reportedly improving since  discharge.     Review of Systems   Constitutional: Positive for fatigue. Negative for chills, fever (resolved) and unexpected weight change.   Respiratory: Positive for cough (improving) and shortness of breath (chronic; improved since hospitalization). Negative for wheezing.    Cardiovascular: Positive for leg swelling (chronic). Negative for chest pain and palpitations.        Orthopnea, PND   Gastrointestinal: Negative for abdominal pain, constipation, diarrhea, nausea and vomiting.   Genitourinary: Negative for frequency and urgency.   Musculoskeletal: Positive for myalgias. Negative for arthralgias and back pain.   Skin: Negative for color change and rash.   Neurological: Negative for dizziness, light-headedness and headaches.       Objective:      There were no vitals filed for this visit.  Physical Exam  unable to be completed as audio only visit  Assessment:       1. Type 2 diabetes mellitus without complication, without long-term current use of insulin    2. Chronic combined systolic and diastolic heart failure    3. Chronic obstructive pulmonary disease, unspecified COPD type    4. MELISSA (obstructive sleep apnea)    5. Essential hypertension        Plan:       Type 2 diabetes mellitus without complication, without long-term current use of insulin  -     metFORMIN (GLUCOPHAGE) 500 MG tablet; Take 1 tablet (500 mg total) by mouth daily with breakfast for 14 days, THEN 1 tablet (500 mg total) 2 (two) times daily with meals for 14 days.  Dispense: 42 tablet; Refill: 0  -     Ambulatory referral/consult to Diabetes Education; Future; Expected date: 08/27/2020  - Patient with new diagnosis of diabetes. Sent home from hospital on glimepiride 1mg daily. Will initiate metformin, uptitrating from 500 daily to 1000mg BID for now. Will continue to to monitor blood sugars and A1C as appropriate. Instructed patient to check daily fasting BG. Referred patient to diabetes educator for education regarding diabetic diet and  proper lifestyle modifications.     Chronic combined systolic and diastolic heart failure         -      Patient currently on 80mg Lasix BID and metolazone PRN for management of her CHF. Last EF 35-40% about one year ago. Patient somewhat poorly controlled based solely on history; unclear true control of CHF as unable to do a physical exam. Will get BNP at next lab draw for baseline/continued monitoring. Consider switching from Lasix to Bumex. Consider repeat echo in near future to better assess EF. Will defer to cardiology for now as patient has follow up appointment within the next month.     Chronic obstructive pulmonary disease, unspecified COPD type      MELISSA (obstructive sleep apnea)       -     Patient with previously diagnosed MELISSA and pulmonary HTN for which she was previously on CPAP. Reinforced with patient the importance of compliance with CPAP. Patient continued to decline use as she feels it is too uncomfortable.     Essential hypertension         -     Currently on Coreg 3.125 BID, Entresto . Will defer to cardiology for management currently. Will check BP at next clinic visit.     Follow up in about 4 weeks (around 9/17/2020).      Edward Damon MD PGY-2  [facility]   08/21/2020 4:46 PM    This note was partially created using [employer] Voice Recognition software. Typographical and content errors may occur with this process. While efforts are made to detect and correct such errors, in some cases errors will persist. For this reason, wording in this document should be considered in the proper context and not strictly verbatim

## 2020-08-24 NOTE — PROGRESS NOTES
Ms. Hawley is a patient of Dr. Pretty and was last seen in clinic 2/4/2020.      Subjective:   Patient ID:  Hafsa Hawley is a 54 y.o. female who presents for follow-up of Palpitations  .     HPI:    Ms. Hawley is a 54 y.o. female with SVT (RFA 5/2019), NICM, CHF, HTN, Sleep Apnea, paroxysmal atrial fibrillation (on eliquis), Fibromyalgia, ICD here for follow up.     Background:    Primary cardiologist is Dr. Cortez.  Longstanding history of non-ischemic cardiomyopathy.  [facility] 11/15/15 EF 30-35% with normal coronary arteries.  Has been on optimal medical therapy.  Admitted to [facility] 9/16 with acute decompensated Heart failure. Diuresed 12 liters.  Echo 8/24/16 EF 20%.  Repeat echo 10/17/16 EF 30-35% with small pericardial effusion.  ICD implanted 12/2/16 (VDD single pass lead).    8/20/2018: Went to ED with chest tightness and palpitations. HR in 150s and found to be in SVT. Given diltiazem and spontaneously converted back to sinus rhythm.   8/30/2018: CHF hospitalization. No arrhythmia during this admission.  10/21/2018: ED with CP. No arrhythmia during this admission. Device check 10/29/2019 showed SVTx2, max 52 seconds during this period.  11/17/2018: ED with CP. SVT on EKG. Spontaneous conversion.  1/27/2019: ED with chest pain and SOB. She was given Adnoesine 6mg x 1 with conversion to SR. Device check 1/30/2019 showed SVTx19, longest 10 minutes.  On EKG SVT c/w AVNRT. Ablation planned.    Underwent successful slow pathway modification on 5/23/2019. No palpitations reported at clinic visit 7/2019.   SVT/ST on device report 2/2020 - patient asymptomatic. On carvedilol 25mg BID. No RV pacing. No changes.    Update (08/26/2020):    Patient hospitalized with covid-19 on 8/12/2020-8/16/2020. Was hospitalized her coreg was decreased due to bradycardia.   She was diagnosed with new-onset DM during hospitalization.    Today she reports that she continues to feel unwell. Has continued SOB. Some palpitations.  Weakness. In a wheechair today. No exertional CP. No syncope.    She is currently taking eliquis 5mg BID for stroke prophylaxis and denies significant bleeding episodes. She is currently being treated with carvedilol 3.125mg BID for HR control.  Kidney function is stable, with a creatinine of 1.7 on 8/16/2020.    Device Interrogation (8/23/2020 - remote) reveals an intrinsic SR with stable lead and device function. No ventricular arrhythmias or treated episodes were noted. No AF. SVT/STx23 episodes. Last episode 8/22/2020 16 seconds c/w ST. She paces 0% in the RV. Battery 3.1V.    I have personally reviewed the patient's EKG today, which shows sinus rhythm at 99bpm. VA interval is 170. QRS is 96. QTc is 523. QTc is extended vs prior EKGs.    Recent Cardiac Tests:    2D Echo (9/17/2019):  · Mild-Moderately decreased left ventricular systolic function. The estimated ejection fraction is 35-40%  · Significantly thickened myocardium, moderate concentric left ventricular hypertrophy.  · Grade I (mild) left ventricular diastolic dysfunction consistent with impaired relaxation.  · Mild left ventricular enlargement.  · Moderate left atrial enlargement.  · Normal right ventricular systolic function.  · Mild mitral regurgitation.  · Trivial Pericardial effusion.  · Intermediate central venous pressure (8 mm Hg).  · The estimated PA systolic pressure is 43 mm Hg    Current Outpatient Medications   Medication Sig    albuterol-ipratropium 2.5mg-0.5mg/3mL (DUO-NEB) 0.5 mg-3 mg(2.5 mg base)/3 mL nebulizer solution Take 1 mL by nebulization every 6 (six) hours as needed for Wheezing or Shortness of Breath.    ALPRAZolam (XANAX) 1 MG tablet Take 1 mg by mouth nightly as needed for Anxiety.    apixaban (ELIQUIS) 5 mg Tab Take 1 tablet (5 mg total) by mouth 2 (two) times daily.    atorvastatin (LIPITOR) 40 MG tablet Take 1 tablet (40 mg total) by mouth once daily.    b complex vitamins tablet Take 1 tablet by mouth once daily.     benzonatate (TESSALON) 100 MG capsule Take 1 capsule (100 mg total) by mouth 3 (three) times daily as needed for Cough.    blood sugar diagnostic Strp 3 times daily    blood-glucose meter kit Use as instructed    budesonide-formoterol 80-4.5 mcg (SYMBICORT) 80-4.5 mcg/actuation HFAA Inhale 2 puffs twice a day by inhalation route.    carvediloL (COREG) 3.125 MG tablet Take 1 tablet (3.125 mg total) by mouth 2 (two) times daily.    cyanocobalamin, vitamin B-12, 50 mcg tablet Take 5 mcg by mouth once daily.    diclofenac sodium (VOLTAREN) 1 % Gel Apply 2 g topically 3 (three) times daily.    ergocalciferol (ERGOCALCIFEROL) 50,000 unit Cap ergocalciferol (vitamin D2) 50,000 unit capsule   Take 1 capsule every week by oral route.    fluticasone propionate (FLONASE) 50 mcg/actuation nasal spray Spray 2 sprays every day by intranasal route.    fluticasone-salmeterol diskus inhaler 100-50 mcg Inhale 1 puff into the lungs 2 (two) times daily. Controller    furosemide (LASIX) 80 MG tablet TAKE 1 TAB TWICE A DAY TAKE EXTRA DOSE FOR GAIN OF 2 OR MORE POUNDS IN 1 DAY OR 5 POUNDS IN 1 WEEK    glimepiride (AMARYL) 1 MG tablet Take 1 tablet (1 mg total) by mouth before breakfast.    lidocaine (LIDODERM) 5 % Place 1 patch onto the skin once daily. Keep patch on for 12 hours then remove. One patch daily (12 hours on/12 hours off).    metFORMIN (GLUCOPHAGE) 500 MG tablet Take 1 tablet (500 mg total) by mouth daily with breakfast for 14 days, THEN 1 tablet (500 mg total) 2 (two) times daily with meals for 14 days.    montelukast (SINGULAIR) 10 mg tablet Take 1 tablet every day by oral route for 30 days.    NITROSTAT 0.4 mg SL tablet Take 2.5 tablets (1 mg total) by mouth every 5 (five) minutes as needed for Chest pain. No more than 3 tablets in 15 minutes.    pantoprazole (PROTONIX) 40 MG tablet Take 1 tablet by mouth once daily.    potassium chloride (KLOR-CON) 10 MEQ TbSR Take 1 tablet (10 mEq total) by mouth  "once daily.    rOPINIRole 6 mg Tb24 Take 0.5 mg by mouth once daily.     sacubitril-valsartan (ENTRESTO)  mg per tablet Take 1 tablet by mouth 2 (two) times daily.    silver sulfADIAZINE 1% (SILVADENE) 1 % cream Silvadene 1 % topical cream   APPLY A 1/16 INCH (1.5 MM) THICK LAYER TO ENTIRE BURN AREA BY TOPICALROUTE 2 TIMES PER DAY for 10 days    VENTOLIN HFA 90 mcg/actuation inhaler Inhale 2 puffs into the lungs 2 (two) times daily as needed.     walker Misc 1 Device by Misc.(Non-Drug; Combo Route) route as needed.     No current facility-administered medications for this visit.      Facility-Administered Medications Ordered in Other Visits   Medication    0.9%  NaCl infusion    vancomycin in dextrose 5 % 1 gram/250 mL IVPB 1,000 mg       Review of Systems   Constitution: Positive for malaise/fatigue.   Cardiovascular: Positive for palpitations. Negative for chest pain, dyspnea on exertion, irregular heartbeat and leg swelling.   Respiratory: Positive for shortness of breath.    Hematologic/Lymphatic: Negative for bleeding problem.   Skin: Negative for rash.   Musculoskeletal: Negative for myalgias.   Gastrointestinal: Negative for hematemesis, hematochezia and nausea.   Genitourinary: Negative for hematuria.   Neurological: Positive for light-headedness.   Psychiatric/Behavioral: Negative for altered mental status.   Allergic/Immunologic: Negative for persistent infections.         Objective:          BP (!) 122/58   Pulse 99   Ht 5' 6" (1.676 m)   Wt 88.3 kg (194 lb 10.7 oz)   LMP  (LMP Unknown)   BMI 31.42 kg/m²     Physical Exam   Constitutional: She is oriented to person, place, and time. She appears well-developed and well-nourished.   HENT:   Head: Normocephalic.   Nose: Nose normal.   Eyes: Pupils are equal, round, and reactive to light.   Cardiovascular: Normal rate, regular rhythm, S1 normal and S2 normal.   No murmur heard.  Pulses:       Radial pulses are 2+ on the right side and 2+ on " the left side.   Pulmonary/Chest: Breath sounds normal. No respiratory distress.   Device to LUCW   Abdominal: Normal appearance.   Musculoskeletal: Normal range of motion.         General: No edema.   Neurological: She is alert and oriented to person, place, and time.   Skin: Skin is warm and dry. No erythema.   Psychiatric: She has a normal mood and affect. Her speech is normal and behavior is normal.   Nursing note and vitals reviewed.    Lab Results   Component Value Date     08/16/2020    K 4.0 08/16/2020    MG 1.9 08/16/2020    BUN 57 (H) 08/16/2020    CREATININE 1.71 (H) 08/16/2020    ALT 20 08/16/2020    AST 23 08/16/2020    HGB 8.4 (L) 08/16/2020    HCT 28.6 (L) 08/16/2020    TSH 0.760 10/21/2018    LDLCALC 62.0 (L) 10/21/2018       Recent Labs   Lab 01/27/19  2135 04/09/19  1548 05/16/19  0936 06/21/19  1136   INR 1.2 1.3 H 1.2 1.1       Assessment:     1. ICD (implantable cardioverter-defibrillator) in place    2. MELISSA (obstructive sleep apnea)    3. Paroxysmal atrial fibrillation    4. Paroxysmal supraventricular tachycardia    5. Essential hypertension    6. NICM (nonischemic cardiomyopathy)      Plan:     In summary, Ms. Hawley is a 54 y.o. female with SVT (RFA 5/2019), NICM, CHF, HTN, Sleep Apnea, paroxysmal atrial fibrillation (on eliquis), Fibromyalgia, ICD here for follow up.   She is 10 days s/p hospitalization from covid-19. She continues to feel unwell, recovering only slowly from her illness. She does report some palpitations. Brief ST/SVT seen on device. No AF or VT. Her coreg was decreased from 25mg BID to 3.125mg BID in hospital. She does not have a lot of BP room to increase dose - will switch to toprol and begin up titration. She was newly diagnosed with diabetes and is meeting with diabetic educator in the next week.   She remains on eliquis for CVA prophylaxis.     Stop carvedilol.  Start metoprolol succinate 25mg BID.  Continue other medications  Continue device checks,  RTC 3 mo,  sooner if needed.    *A copy of this note has been sent to Dr. Pretty*    Follow up in about 3 months (around 11/26/2020).    ------------------------------------------------------------------    LYNN Fields, NP-C  Cardiac Electrophysiology

## 2020-08-25 ENCOUNTER — TELEPHONE (OUTPATIENT)
Dept: ELECTROPHYSIOLOGY | Facility: CLINIC | Age: 55
End: 2020-08-25

## 2020-08-26 ENCOUNTER — HOSPITAL ENCOUNTER (OUTPATIENT)
Dept: CARDIOLOGY | Facility: CLINIC | Age: 55
Discharge: HOME | End: 2020-08-26
Payer: MEDICAID

## 2020-08-26 ENCOUNTER — OFFICE VISIT (OUTPATIENT)
Dept: ELECTROPHYSIOLOGY | Facility: CLINIC | Age: 55
End: 2020-08-26
Payer: MEDICAID

## 2020-08-26 VITALS
SYSTOLIC BLOOD PRESSURE: 122 MMHG | HEIGHT: 66 IN | BODY MASS INDEX: 31.29 KG/M2 | DIASTOLIC BLOOD PRESSURE: 58 MMHG | HEART RATE: 99 BPM | WEIGHT: 194.69 LBS

## 2020-08-26 DIAGNOSIS — I10 ESSENTIAL HYPERTENSION: Chronic | ICD-10-CM

## 2020-08-26 DIAGNOSIS — Z95.810 ICD (IMPLANTABLE CARDIOVERTER-DEFIBRILLATOR) IN PLACE: Primary | Chronic | ICD-10-CM

## 2020-08-26 DIAGNOSIS — G47.33 OSA (OBSTRUCTIVE SLEEP APNEA): Chronic | ICD-10-CM

## 2020-08-26 DIAGNOSIS — I42.8 NICM (NONISCHEMIC CARDIOMYOPATHY): Chronic | ICD-10-CM

## 2020-08-26 DIAGNOSIS — I48.0 PAROXYSMAL ATRIAL FIBRILLATION: Chronic | ICD-10-CM

## 2020-08-26 DIAGNOSIS — I49.8 OTHER SPECIFIED CARDIAC ARRHYTHMIAS: ICD-10-CM

## 2020-08-26 DIAGNOSIS — I47.10 PAROXYSMAL SUPRAVENTRICULAR TACHYCARDIA: Chronic | ICD-10-CM

## 2020-08-26 PROCEDURE — 93010 RHYTHM STRIP: ICD-10-PCS | Mod: S$PBB,,, | Performed by: INTERNAL MEDICINE

## 2020-08-26 PROCEDURE — 99999 PR PBB SHADOW E&M-EST. PATIENT-LVL V: ICD-10-PCS | Mod: PBBFAC,,, | Performed by: NURSE PRACTITIONER

## 2020-08-26 PROCEDURE — 93010 ELECTROCARDIOGRAM REPORT: CPT | Mod: S$PBB,,, | Performed by: INTERNAL MEDICINE

## 2020-08-26 PROCEDURE — 93005 ELECTROCARDIOGRAM TRACING: CPT | Mod: PBBFAC | Performed by: INTERNAL MEDICINE

## 2020-08-26 PROCEDURE — 99999 PR PBB SHADOW E&M-EST. PATIENT-LVL V: CPT | Mod: PBBFAC,,, | Performed by: NURSE PRACTITIONER

## 2020-08-26 PROCEDURE — 99214 OFFICE O/P EST MOD 30 MIN: CPT | Mod: S$PBB,,, | Performed by: NURSE PRACTITIONER

## 2020-08-26 PROCEDURE — 99215 OFFICE O/P EST HI 40 MIN: CPT | Mod: PBBFAC,25 | Performed by: NURSE PRACTITIONER

## 2020-08-26 PROCEDURE — 99214 PR OFFICE/OUTPT VISIT, EST, LEVL IV, 30-39 MIN: ICD-10-PCS | Mod: S$PBB,,, | Performed by: NURSE PRACTITIONER

## 2020-08-26 RX ORDER — CARVEDILOL 6.25 MG/1
6.25 TABLET ORAL 2 TIMES DAILY
Qty: 60 TABLET | Refills: 3 | Status: SHIPPED | OUTPATIENT
Start: 2020-08-26 | End: 2020-08-26

## 2020-08-26 RX ORDER — METOPROLOL SUCCINATE 25 MG/1
25 TABLET, EXTENDED RELEASE ORAL 2 TIMES DAILY
Qty: 60 TABLET | Refills: 11 | Status: SHIPPED | OUTPATIENT
Start: 2020-08-26 | End: 2020-09-11

## 2020-08-31 ENCOUNTER — CLINICAL SUPPORT (OUTPATIENT)
Dept: DIABETES | Facility: CLINIC | Age: 55
End: 2020-08-31
Payer: MEDICAID

## 2020-08-31 DIAGNOSIS — E11.9 TYPE 2 DIABETES MELLITUS WITHOUT COMPLICATION, WITHOUT LONG-TERM CURRENT USE OF INSULIN: ICD-10-CM

## 2020-08-31 PROCEDURE — 99211 OFF/OP EST MAY X REQ PHY/QHP: CPT | Mod: PBBFAC,PN | Performed by: DIETITIAN, REGISTERED

## 2020-08-31 PROCEDURE — 99999 PR PBB SHADOW E&M-EST. PATIENT-LVL I: ICD-10-PCS | Mod: PBBFAC,,, | Performed by: DIETITIAN, REGISTERED

## 2020-08-31 PROCEDURE — G0108 DIAB MANAGE TRN  PER INDIV: HCPCS | Mod: PBBFAC,PN | Performed by: DIETITIAN, REGISTERED

## 2020-08-31 PROCEDURE — 99999 PR PBB SHADOW E&M-EST. PATIENT-LVL I: CPT | Mod: PBBFAC,,, | Performed by: DIETITIAN, REGISTERED

## 2020-09-02 PROCEDURE — 82962 GLUCOSE BLOOD TEST: CPT

## 2020-09-02 PROCEDURE — 99291 CRITICAL CARE FIRST HOUR: CPT | Mod: 25

## 2020-09-02 PROCEDURE — 93005 ELECTROCARDIOGRAM TRACING: CPT

## 2020-09-02 PROCEDURE — 93010 EKG 12-LEAD: ICD-10-PCS | Mod: ,,, | Performed by: INTERNAL MEDICINE

## 2020-09-02 PROCEDURE — 96366 THER/PROPH/DIAG IV INF ADDON: CPT | Mod: 59

## 2020-09-02 PROCEDURE — 93010 ELECTROCARDIOGRAM REPORT: CPT | Mod: ,,, | Performed by: INTERNAL MEDICINE

## 2020-09-02 PROCEDURE — 96374 THER/PROPH/DIAG INJ IV PUSH: CPT

## 2020-09-02 PROCEDURE — 96365 THER/PROPH/DIAG IV INF INIT: CPT | Mod: 59

## 2020-09-03 ENCOUNTER — TELEPHONE (OUTPATIENT)
Dept: ELECTROPHYSIOLOGY | Facility: CLINIC | Age: 55
End: 2020-09-03

## 2020-09-03 ENCOUNTER — HOSPITAL ENCOUNTER (INPATIENT)
Facility: HOSPITAL | Age: 55
LOS: 1 days | Discharge: HOME | DRG: 281 | End: 2020-09-04
Attending: EMERGENCY MEDICINE | Admitting: FAMILY MEDICINE
Payer: MEDICAID

## 2020-09-03 DIAGNOSIS — R06.02 SHORTNESS OF BREATH: ICD-10-CM

## 2020-09-03 DIAGNOSIS — M51.36 LUMBAR DEGENERATIVE DISC DISEASE: ICD-10-CM

## 2020-09-03 DIAGNOSIS — I50.42 CHRONIC COMBINED SYSTOLIC AND DIASTOLIC HEART FAILURE: ICD-10-CM

## 2020-09-03 DIAGNOSIS — I42.8 NICM (NONISCHEMIC CARDIOMYOPATHY): Chronic | ICD-10-CM

## 2020-09-03 DIAGNOSIS — D50.9 MICROCYTIC ANEMIA: Chronic | ICD-10-CM

## 2020-09-03 DIAGNOSIS — I51.7 LEFT VENTRICULAR HYPERTROPHY: Chronic | ICD-10-CM

## 2020-09-03 DIAGNOSIS — N18.30 CHRONIC KIDNEY DISEASE, STAGE III (MODERATE): Chronic | ICD-10-CM

## 2020-09-03 DIAGNOSIS — I48.0 PAROXYSMAL ATRIAL FIBRILLATION: Chronic | ICD-10-CM

## 2020-09-03 DIAGNOSIS — I10 ESSENTIAL HYPERTENSION: Chronic | ICD-10-CM

## 2020-09-03 DIAGNOSIS — G47.33 OSA (OBSTRUCTIVE SLEEP APNEA): Primary | Chronic | ICD-10-CM

## 2020-09-03 DIAGNOSIS — J41.0 SIMPLE CHRONIC BRONCHITIS: Chronic | ICD-10-CM

## 2020-09-03 DIAGNOSIS — R07.9 CHEST PAIN: ICD-10-CM

## 2020-09-03 DIAGNOSIS — E11.65 TYPE 2 DIABETES MELLITUS WITH HYPERGLYCEMIA, WITHOUT LONG-TERM CURRENT USE OF INSULIN: ICD-10-CM

## 2020-09-03 DIAGNOSIS — I21.4 NSTEMI (NON-ST ELEVATED MYOCARDIAL INFARCTION): ICD-10-CM

## 2020-09-03 LAB
ALBUMIN SERPL BCP-MCNC: 3.1 G/DL (ref 3.5–5.2)
ALP SERPL-CCNC: 64 U/L (ref 55–135)
ALT SERPL W/O P-5'-P-CCNC: 21 U/L (ref 10–44)
AMORPH CRY URNS QL MICRO: ABNORMAL
ANION GAP SERPL CALC-SCNC: 13 MMOL/L (ref 8–16)
ANISOCYTOSIS BLD QL SMEAR: ABNORMAL
APTT BLDCRRT: 22.2 SEC (ref 21–32)
APTT BLDCRRT: 35.4 SEC (ref 21–32)
AST SERPL-CCNC: 27 U/L (ref 10–40)
BACTERIA #/AREA URNS HPF: ABNORMAL /HPF
BASOPHILS # BLD AUTO: 0.02 K/UL (ref 0–0.2)
BASOPHILS NFR BLD: 0.3 % (ref 0–1.9)
BILIRUB SERPL-MCNC: 0.9 MG/DL (ref 0.1–1)
BILIRUB UR QL STRIP: NEGATIVE
BNP SERPL-MCNC: 385 PG/ML (ref 0–99)
BUN SERPL-MCNC: 37 MG/DL (ref 6–20)
BURR CELLS BLD QL SMEAR: ABNORMAL
CALCIUM SERPL-MCNC: 8.4 MG/DL (ref 8.7–10.5)
CHLORIDE SERPL-SCNC: 107 MMOL/L (ref 95–110)
CHOLEST SERPL-MCNC: 193 MG/DL (ref 120–199)
CHOLEST/HDLC SERPL: 3 {RATIO} (ref 2–5)
CLARITY UR: CLEAR
CO2 SERPL-SCNC: 21 MMOL/L (ref 23–29)
COLOR UR: YELLOW
CREAT SERPL-MCNC: 1.7 MG/DL (ref 0.5–1.4)
CRP SERPL-MCNC: 1.2 MG/L (ref 0–8.2)
DACRYOCYTES BLD QL SMEAR: ABNORMAL
DIFFERENTIAL METHOD: ABNORMAL
EOSINOPHIL # BLD AUTO: 0.3 K/UL (ref 0–0.5)
EOSINOPHIL NFR BLD: 4.8 % (ref 0–8)
ERYTHROCYTE [DISTWIDTH] IN BLOOD BY AUTOMATED COUNT: 27.9 % (ref 11.5–14.5)
EST. GFR  (AFRICAN AMERICAN): 39 ML/MIN/1.73 M^2
EST. GFR  (NON AFRICAN AMERICAN): 33 ML/MIN/1.73 M^2
ESTIMATED AVG GLUCOSE: 177 MG/DL (ref 68–131)
FERRITIN SERPL-MCNC: 507 NG/ML (ref 20–300)
FOLATE SERPL-MCNC: 35.1 NG/ML (ref 4–24)
GLUCOSE SERPL-MCNC: 157 MG/DL (ref 70–110)
GLUCOSE UR QL STRIP: ABNORMAL
HBA1C MFR BLD HPLC: 7.8 % (ref 4–5.6)
HCT VFR BLD AUTO: 29.8 % (ref 37–48.5)
HDLC SERPL-MCNC: 64 MG/DL (ref 40–75)
HDLC SERPL: 33.2 % (ref 20–50)
HGB BLD-MCNC: 8.8 G/DL (ref 12–16)
HGB UR QL STRIP: ABNORMAL
HYALINE CASTS #/AREA URNS LPF: 6 /LPF
HYPOCHROMIA BLD QL SMEAR: ABNORMAL
IMM GRANULOCYTES # BLD AUTO: 0.03 K/UL (ref 0–0.04)
IMM GRANULOCYTES NFR BLD AUTO: 0.5 % (ref 0–0.5)
INR PPP: 1 (ref 0.8–1.2)
INR PPP: 1 (ref 0.8–1.2)
IRON SERPL-MCNC: 60 UG/DL (ref 30–160)
KETONES UR QL STRIP: NEGATIVE
LDH SERPL L TO P-CCNC: 537 U/L (ref 110–260)
LDLC SERPL CALC-MCNC: 108.6 MG/DL (ref 63–159)
LEUKOCYTE ESTERASE UR QL STRIP: NEGATIVE
LYMPHOCYTES # BLD AUTO: 1.5 K/UL (ref 1–4.8)
LYMPHOCYTES NFR BLD: 23.8 % (ref 18–48)
MAGNESIUM SERPL-MCNC: 1.3 MG/DL (ref 1.6–2.6)
MAGNESIUM SERPL-MCNC: 1.3 MG/DL (ref 1.6–2.6)
MCH RBC QN AUTO: 17.3 PG (ref 27–31)
MCHC RBC AUTO-ENTMCNC: 29.5 G/DL (ref 32–36)
MCV RBC AUTO: 59 FL (ref 82–98)
MICROSCOPIC COMMENT: ABNORMAL
MONOCYTES # BLD AUTO: 0.4 K/UL (ref 0.3–1)
MONOCYTES NFR BLD: 6.6 % (ref 4–15)
NEUTROPHILS # BLD AUTO: 3.9 K/UL (ref 1.8–7.7)
NEUTROPHILS NFR BLD: 64 % (ref 38–73)
NITRITE UR QL STRIP: NEGATIVE
NONHDLC SERPL-MCNC: 129 MG/DL
NRBC BLD-RTO: 0 /100 WBC
OVALOCYTES BLD QL SMEAR: ABNORMAL
PH UR STRIP: 6 [PH] (ref 5–8)
PHOSPHATE SERPL-MCNC: 3.4 MG/DL (ref 2.7–4.5)
PHOSPHATE SERPL-MCNC: 3.4 MG/DL (ref 2.7–4.5)
PLATELET # BLD AUTO: 125 K/UL (ref 150–350)
PLATELET BLD QL SMEAR: ABNORMAL
PMV BLD AUTO: ABNORMAL FL (ref 9.2–12.9)
POCT GLUCOSE: 128 MG/DL (ref 70–110)
POCT GLUCOSE: 142 MG/DL (ref 70–110)
POCT GLUCOSE: 150 MG/DL (ref 70–110)
POCT GLUCOSE: 152 MG/DL (ref 70–110)
POCT GLUCOSE: 68 MG/DL (ref 70–110)
POIKILOCYTOSIS BLD QL SMEAR: ABNORMAL
POLYCHROMASIA BLD QL SMEAR: ABNORMAL
POTASSIUM SERPL-SCNC: 4.1 MMOL/L (ref 3.5–5.1)
PROT SERPL-MCNC: 6.2 G/DL (ref 6–8.4)
PROT UR QL STRIP: ABNORMAL
PROTHROMBIN TIME: 10.5 SEC (ref 9–12.5)
PROTHROMBIN TIME: 10.5 SEC (ref 9–12.5)
RBC # BLD AUTO: 5.09 M/UL (ref 4–5.4)
RBC #/AREA URNS HPF: 1 /HPF (ref 0–4)
SARS-COV-2 RDRP RESP QL NAA+PROBE: NEGATIVE
SATURATED IRON: 23 % (ref 20–50)
SCHISTOCYTES BLD QL SMEAR: PRESENT
SODIUM SERPL-SCNC: 141 MMOL/L (ref 136–145)
SP GR UR STRIP: 1.02 (ref 1–1.03)
SPHEROCYTES BLD QL SMEAR: ABNORMAL
SQUAMOUS #/AREA URNS HPF: 5 /HPF
TARGETS BLD QL SMEAR: ABNORMAL
TOTAL IRON BINDING CAPACITY: 262 UG/DL (ref 250–450)
TRANSFERRIN SERPL-MCNC: 177 MG/DL (ref 200–375)
TRIGL SERPL-MCNC: 102 MG/DL (ref 30–150)
TROPONIN I SERPL DL<=0.01 NG/ML-MCNC: 0.5 NG/ML (ref 0–0.03)
TROPONIN I SERPL DL<=0.01 NG/ML-MCNC: 0.52 NG/ML (ref 0–0.03)
TROPONIN I SERPL DL<=0.01 NG/ML-MCNC: 0.52 NG/ML (ref 0–0.03)
TROPONIN I SERPL DL<=0.01 NG/ML-MCNC: 0.53 NG/ML (ref 0–0.03)
TSH SERPL DL<=0.005 MIU/L-ACNC: 1.33 UIU/ML (ref 0.4–4)
URN SPEC COLLECT METH UR: ABNORMAL
UROBILINOGEN UR STRIP-ACNC: NEGATIVE EU/DL
VIT B12 SERPL-MCNC: 1081 PG/ML (ref 210–950)
WBC # BLD AUTO: 6.1 K/UL (ref 3.9–12.7)
WBC #/AREA URNS HPF: 3 /HPF (ref 0–5)

## 2020-09-03 PROCEDURE — 82746 ASSAY OF FOLIC ACID SERUM: CPT

## 2020-09-03 PROCEDURE — 85730 THROMBOPLASTIN TIME PARTIAL: CPT | Mod: 91

## 2020-09-03 PROCEDURE — 86140 C-REACTIVE PROTEIN: CPT

## 2020-09-03 PROCEDURE — 63600175 PHARM REV CODE 636 W HCPCS: Performed by: EMERGENCY MEDICINE

## 2020-09-03 PROCEDURE — U0002 COVID-19 LAB TEST NON-CDC: HCPCS

## 2020-09-03 PROCEDURE — 83735 ASSAY OF MAGNESIUM: CPT

## 2020-09-03 PROCEDURE — 85610 PROTHROMBIN TIME: CPT

## 2020-09-03 PROCEDURE — 81000 URINALYSIS NONAUTO W/SCOPE: CPT

## 2020-09-03 PROCEDURE — 84484 ASSAY OF TROPONIN QUANT: CPT | Mod: 91

## 2020-09-03 PROCEDURE — 84100 ASSAY OF PHOSPHORUS: CPT

## 2020-09-03 PROCEDURE — 82728 ASSAY OF FERRITIN: CPT

## 2020-09-03 PROCEDURE — 25000003 PHARM REV CODE 250: Performed by: STUDENT IN AN ORGANIZED HEALTH CARE EDUCATION/TRAINING PROGRAM

## 2020-09-03 PROCEDURE — 63600175 PHARM REV CODE 636 W HCPCS: Performed by: STUDENT IN AN ORGANIZED HEALTH CARE EDUCATION/TRAINING PROGRAM

## 2020-09-03 PROCEDURE — 80053 COMPREHEN METABOLIC PANEL: CPT

## 2020-09-03 PROCEDURE — 94640 AIRWAY INHALATION TREATMENT: CPT

## 2020-09-03 PROCEDURE — 11000001 HC ACUTE MED/SURG PRIVATE ROOM

## 2020-09-03 PROCEDURE — 25000003 PHARM REV CODE 250: Performed by: EMERGENCY MEDICINE

## 2020-09-03 PROCEDURE — 82607 VITAMIN B-12: CPT

## 2020-09-03 PROCEDURE — 83540 ASSAY OF IRON: CPT

## 2020-09-03 PROCEDURE — 85025 COMPLETE CBC W/AUTO DIFF WBC: CPT

## 2020-09-03 PROCEDURE — 84443 ASSAY THYROID STIM HORMONE: CPT

## 2020-09-03 PROCEDURE — 85730 THROMBOPLASTIN TIME PARTIAL: CPT

## 2020-09-03 PROCEDURE — 80061 LIPID PANEL: CPT

## 2020-09-03 PROCEDURE — 94761 N-INVAS EAR/PLS OXIMETRY MLT: CPT

## 2020-09-03 PROCEDURE — 36415 COLL VENOUS BLD VENIPUNCTURE: CPT

## 2020-09-03 PROCEDURE — 84484 ASSAY OF TROPONIN QUANT: CPT

## 2020-09-03 PROCEDURE — 83615 LACTATE (LD) (LDH) ENZYME: CPT

## 2020-09-03 PROCEDURE — 83036 HEMOGLOBIN GLYCOSYLATED A1C: CPT

## 2020-09-03 PROCEDURE — 83880 ASSAY OF NATRIURETIC PEPTIDE: CPT

## 2020-09-03 PROCEDURE — 85610 PROTHROMBIN TIME: CPT | Mod: 91

## 2020-09-03 PROCEDURE — 25000242 PHARM REV CODE 250 ALT 637 W/ HCPCS: Performed by: STUDENT IN AN ORGANIZED HEALTH CARE EDUCATION/TRAINING PROGRAM

## 2020-09-03 RX ORDER — SODIUM CHLORIDE 0.9 % (FLUSH) 0.9 %
5 SYRINGE (ML) INJECTION
Status: DISCONTINUED | OUTPATIENT
Start: 2020-09-03 | End: 2020-09-04 | Stop reason: HOSPADM

## 2020-09-03 RX ORDER — PANTOPRAZOLE SODIUM 40 MG/1
40 TABLET, DELAYED RELEASE ORAL DAILY
Status: DISCONTINUED | OUTPATIENT
Start: 2020-09-03 | End: 2020-09-04 | Stop reason: HOSPADM

## 2020-09-03 RX ORDER — MONTELUKAST SODIUM 10 MG/1
10 TABLET ORAL DAILY
Status: DISCONTINUED | OUTPATIENT
Start: 2020-09-03 | End: 2020-09-04 | Stop reason: HOSPADM

## 2020-09-03 RX ORDER — FUROSEMIDE 10 MG/ML
80 INJECTION INTRAMUSCULAR; INTRAVENOUS ONCE
Status: COMPLETED | OUTPATIENT
Start: 2020-09-03 | End: 2020-09-03

## 2020-09-03 RX ORDER — IBUPROFEN 200 MG
24 TABLET ORAL
Status: DISCONTINUED | OUTPATIENT
Start: 2020-09-03 | End: 2020-09-04 | Stop reason: HOSPADM

## 2020-09-03 RX ORDER — FLUTICASONE PROPIONATE 50 MCG
2 SPRAY, SUSPENSION (ML) NASAL DAILY
Status: DISCONTINUED | OUTPATIENT
Start: 2020-09-03 | End: 2020-09-04 | Stop reason: HOSPADM

## 2020-09-03 RX ORDER — INSULIN ASPART 100 [IU]/ML
1-10 INJECTION, SOLUTION INTRAVENOUS; SUBCUTANEOUS
Status: DISCONTINUED | OUTPATIENT
Start: 2020-09-03 | End: 2020-09-04 | Stop reason: HOSPADM

## 2020-09-03 RX ORDER — HYDRALAZINE HYDROCHLORIDE 25 MG/1
25 TABLET, FILM COATED ORAL EVERY 8 HOURS
Status: DISCONTINUED | OUTPATIENT
Start: 2020-09-03 | End: 2020-09-04 | Stop reason: HOSPADM

## 2020-09-03 RX ORDER — ATORVASTATIN CALCIUM 40 MG/1
40 TABLET, FILM COATED ORAL DAILY
Status: DISCONTINUED | OUTPATIENT
Start: 2020-09-03 | End: 2020-09-04 | Stop reason: HOSPADM

## 2020-09-03 RX ORDER — ONDANSETRON 8 MG/1
8 TABLET, ORALLY DISINTEGRATING ORAL EVERY 6 HOURS PRN
Status: DISCONTINUED | OUTPATIENT
Start: 2020-09-03 | End: 2020-09-04 | Stop reason: HOSPADM

## 2020-09-03 RX ORDER — LIDOCAINE 50 MG/G
1 PATCH TOPICAL DAILY PRN
Status: DISCONTINUED | OUTPATIENT
Start: 2020-09-03 | End: 2020-09-04 | Stop reason: HOSPADM

## 2020-09-03 RX ORDER — ISOSORBIDE DINITRATE 10 MG/1
20 TABLET ORAL 3 TIMES DAILY
Status: DISCONTINUED | OUTPATIENT
Start: 2020-09-03 | End: 2020-09-04 | Stop reason: HOSPADM

## 2020-09-03 RX ORDER — AMOXICILLIN 250 MG
1 CAPSULE ORAL 2 TIMES DAILY
Status: DISCONTINUED | OUTPATIENT
Start: 2020-09-03 | End: 2020-09-04 | Stop reason: HOSPADM

## 2020-09-03 RX ORDER — GLUCAGON 1 MG
1 KIT INJECTION
Status: DISCONTINUED | OUTPATIENT
Start: 2020-09-03 | End: 2020-09-04 | Stop reason: HOSPADM

## 2020-09-03 RX ORDER — TRAZODONE HYDROCHLORIDE 50 MG/1
50 TABLET ORAL NIGHTLY
Status: DISCONTINUED | OUTPATIENT
Start: 2020-09-03 | End: 2020-09-04 | Stop reason: HOSPADM

## 2020-09-03 RX ORDER — IBUPROFEN 200 MG
16 TABLET ORAL
Status: DISCONTINUED | OUTPATIENT
Start: 2020-09-03 | End: 2020-09-04 | Stop reason: HOSPADM

## 2020-09-03 RX ORDER — HEPARIN SODIUM,PORCINE/D5W 25000/250
12 INTRAVENOUS SOLUTION INTRAVENOUS CONTINUOUS
Status: DISCONTINUED | OUTPATIENT
Start: 2020-09-03 | End: 2020-09-03

## 2020-09-03 RX ORDER — MAGNESIUM SULFATE HEPTAHYDRATE 40 MG/ML
2 INJECTION, SOLUTION INTRAVENOUS ONCE
Status: COMPLETED | OUTPATIENT
Start: 2020-09-03 | End: 2020-09-03

## 2020-09-03 RX ORDER — METOPROLOL SUCCINATE 25 MG/1
25 TABLET, EXTENDED RELEASE ORAL 2 TIMES DAILY
Status: DISCONTINUED | OUTPATIENT
Start: 2020-09-03 | End: 2020-09-04 | Stop reason: HOSPADM

## 2020-09-03 RX ORDER — ROPINIROLE 1 MG/1
6 TABLET, FILM COATED ORAL NIGHTLY
Status: DISCONTINUED | OUTPATIENT
Start: 2020-09-03 | End: 2020-09-04 | Stop reason: HOSPADM

## 2020-09-03 RX ORDER — NAPROXEN SODIUM 220 MG/1
324 TABLET, FILM COATED ORAL
Status: COMPLETED | OUTPATIENT
Start: 2020-09-03 | End: 2020-09-03

## 2020-09-03 RX ORDER — FLUTICASONE FUROATE AND VILANTEROL 100; 25 UG/1; UG/1
1 POWDER RESPIRATORY (INHALATION) DAILY
Status: DISCONTINUED | OUTPATIENT
Start: 2020-09-03 | End: 2020-09-04 | Stop reason: HOSPADM

## 2020-09-03 RX ADMIN — TRAZODONE HYDROCHLORIDE 50 MG: 50 TABLET ORAL at 09:09

## 2020-09-03 RX ADMIN — SACUBITRIL AND VALSARTAN 1 TABLET: 97; 103 TABLET, FILM COATED ORAL at 09:09

## 2020-09-03 RX ADMIN — ATORVASTATIN CALCIUM 40 MG: 40 TABLET, FILM COATED ORAL at 08:09

## 2020-09-03 RX ADMIN — DOCUSATE SODIUM - SENNOSIDES 1 TABLET: 50; 8.6 TABLET, FILM COATED ORAL at 09:09

## 2020-09-03 RX ADMIN — ASPIRIN 81 MG 324 MG: 81 TABLET ORAL at 03:09

## 2020-09-03 RX ADMIN — ISOSORBIDE DINITRATE 20 MG: 10 TABLET ORAL at 06:09

## 2020-09-03 RX ADMIN — PANTOPRAZOLE SODIUM 40 MG: 40 TABLET, DELAYED RELEASE ORAL at 08:09

## 2020-09-03 RX ADMIN — ISOSORBIDE DINITRATE 20 MG: 10 TABLET ORAL at 09:09

## 2020-09-03 RX ADMIN — HYDRALAZINE HYDROCHLORIDE 25 MG: 25 TABLET, FILM COATED ORAL at 09:09

## 2020-09-03 RX ADMIN — HYDRALAZINE HYDROCHLORIDE 25 MG: 25 TABLET, FILM COATED ORAL at 06:09

## 2020-09-03 RX ADMIN — METOPROLOL SUCCINATE 25 MG: 25 TABLET, EXTENDED RELEASE ORAL at 08:09

## 2020-09-03 RX ADMIN — HEPARIN SODIUM AND DEXTROSE 12 UNITS/KG/HR: 10000; 5 INJECTION INTRAVENOUS at 03:09

## 2020-09-03 RX ADMIN — LIDOCAINE 1 PATCH: 50 PATCH CUTANEOUS at 08:09

## 2020-09-03 RX ADMIN — FUROSEMIDE 80 MG: 10 INJECTION, SOLUTION INTRAVENOUS at 06:09

## 2020-09-03 RX ADMIN — METOPROLOL SUCCINATE 25 MG: 25 TABLET, EXTENDED RELEASE ORAL at 09:09

## 2020-09-03 RX ADMIN — ROPINIROLE HYDROCHLORIDE 6 MG: 1 TABLET, FILM COATED ORAL at 09:09

## 2020-09-03 RX ADMIN — SACUBITRIL AND VALSARTAN 1 TABLET: 97; 103 TABLET, FILM COATED ORAL at 10:09

## 2020-09-03 RX ADMIN — FLUTICASONE FUROATE AND VILANTEROL TRIFENATATE 1 PUFF: 100; 25 POWDER RESPIRATORY (INHALATION) at 08:09

## 2020-09-03 RX ADMIN — ONDANSETRON 8 MG: 8 TABLET, ORALLY DISINTEGRATING ORAL at 11:09

## 2020-09-03 RX ADMIN — MAGNESIUM SULFATE IN WATER 2 G: 40 INJECTION, SOLUTION INTRAVENOUS at 08:09

## 2020-09-03 NOTE — ASSESSMENT & PLAN NOTE
-Very extensive cardiac hx see recent cardiology note, ICD/NICM/CHF EF35%/ A FIB/ SVT (RFA 5/2019)  -Post covid 08/12/2020, c/o continued sob and now epigastric/substernal pressure, chronically elevated troponin with bump today 0.523, Chronically poor EKG showing LVH LAD no STEMI  -ASA and heparin drip started in ED    Plan: Continue heparin drip, Cards consulted for possible cath

## 2020-09-03 NOTE — ED PROVIDER NOTES
"Encounter Date: 9/2/2020       History     Chief Complaint   Patient presents with    Shortness of Breath     Covid positive Aug 12th.  pt reports SOB, cough     54 year old female with PMHx CHF (last EF 35-40% 9/2019), COPD, MELISSA, HTN, DM, CKD III , AFib, presents the ER for evaluation weakness fatigue cough shortness of breath.  Recently diagnosed with COVID August 12 admitted for 4 days discharged.  Since then complaining of malaise shortness of breath.  Reports also complaining of atypical epigastric/sternal pressure.  Reports that she has been feeling unwell but the last 2 days progressively worsened, she noticed that her blood pressure was high with an elevated diastolic concern to come to the ER.  She denies fever chills.        Review of patient's allergies indicates:   Allergen Reactions    Penicillins Hives    Iodinated contrast media Nausea And Vomiting    Oxycodone-acetaminophen Other (See Comments)     Nausea, Dizziness, Anxiety.  "I don't like how it makes me feel."   Given Hydromorphone 0.5mg IVP  Without problems.  Other reaction(s): Other (See Comments)    Diovan hct [valsartan-hydrochlorothiazide] Other (See Comments)     Causes coughing    Irinotecan      Pt has homozygosity for the TA7 promoter variant that places this individual at significantly increased risk for   severe neutropenia (grade 4) when treated with the standard dose of irinotecan (risk approximately 50%).   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 Metabolism of other drugs not listed here may also be impacted by UGT1A1 enzyme activity.       Tramadol Nausea And Vomiting     Other reaction(s): Other (See Comments)     Past Medical History:   Diagnosis Date    Anemia     Anticoagulant long-term use     Arthritis     Atrial fibrillation     Congestive heart failure     COPD (chronic obstructive " pulmonary disease)     Deep vein thrombosis     elevated bilirubin d/t Gilbert's syndrome     confirmed by [place] genetic testing, evaluated by hepatology    Encounter for blood transfusion     GERD (gastroesophageal reflux disease)     Hypertension     Pheochromocytoma, malignant     Right kidney mass     Sleep apnea     Thyroid disease     Type 2 diabetes mellitus with hyperglycemia, without long-term current use of insulin 2020     Past Surgical History:   Procedure Laterality Date    APPENDECTOMY      CARDIAC DEFIBRILLATOR PLACEMENT Left 2016    EYE SURGERY      due to running tears    FRACTURE SURGERY Left     hand 5th digit    HYSTERECTOMY      KNEE SURGERY Left 2016    hematoma    LIVER BIOPSY  10/24/2018    Minimal steatosis, predominantly macrovesicular, 1%, Minimal nonspecific portal inflammation, no fibrosis. No findings on biopsy to explain elevated bilirubin levels. Could be d/t Gilbert's =?- hemolysis    TRANSJUGULAR BIOPSY OF LIVER N/A 10/24/2018    Procedure: BIOPSY, LIVER, TRANSJUGULAR APPROACH;  Surgeon: Carmen Diagnostic Provider;  Location: [facility] OR [address] [place] [address];  Service: Radiology;  Laterality: N/A;     Family History   Problem Relation Age of Onset    Cancer Mother         pancreatic CA early 50's    Heart disease Father          MI in late 50's    Hypertension Father     Heart attack Father     Heart disease Sister     Heart disease Brother     Cirrhosis Brother         alcoholic    Heart disease Sister     Heart disease Brother     Hypertension Brother     Diabetes Brother      Social History     Tobacco Use    Smoking status: Never Smoker    Smokeless tobacco: Never Used   Substance Use Topics    Alcohol use: Yes     Frequency: Monthly or less     Drinks per session: 1 or 2     Comment: up to 1 yr ago drank 2-3 drinks on occasion but sporadic    Drug use: No     Review of Systems   Constitutional: Positive for fatigue. Negative for chills and fever.    Respiratory: Positive for cough and shortness of breath.    Cardiovascular: Positive for chest pain.   Gastrointestinal: Negative for abdominal pain.   Genitourinary: Negative for dysuria.   Neurological: Positive for weakness.       Physical Exam     Initial Vitals [09/02/20 2228]   BP Pulse Resp Temp SpO2   (!) 163/94 107 18 98.7 °F (37.1 °C) 97 %      MAP       --         Physical Exam    Constitutional:   Elderly obese chronically ill-appearing   HENT:   Head: Normocephalic and atraumatic.   Eyes: Pupils are equal, round, and reactive to light.   Neck: Normal range of motion.   Cardiovascular: Normal rate and regular rhythm.   Pulmonary/Chest: No respiratory distress.   Faint crackles bilaterally no respiratory distress   Abdominal: Soft. She exhibits no distension. There is no abdominal tenderness.   Musculoskeletal: Normal range of motion.   Neurological: She is alert and oriented to person, place, and time. She has normal strength. GCS score is 15. GCS eye subscore is 4. GCS verbal subscore is 5. GCS motor subscore is 6.   Skin: Skin is warm and dry. Capillary refill takes less than 2 seconds.   Psychiatric: She has a normal mood and affect. Thought content normal.         ED Course   Critical Care    Date/Time: 9/3/2020 4:56 AM  Performed by: Zeus Yeboah MD  Authorized by: Zeus Yeboah MD   Total critical care time (exclusive of procedural time) : 40 minutes  Comments: 40 min critical care time for evaluation management of NSTEMI.        Labs Reviewed   CBC W/ AUTO DIFFERENTIAL - Abnormal; Notable for the following components:       Result Value    Hemoglobin 8.8 (*)     Hematocrit 29.8 (*)     Mean Corpuscular Volume 59 (*)     Mean Corpuscular Hemoglobin 17.3 (*)     Mean Corpuscular Hemoglobin Conc 29.5 (*)     RDW 27.9 (*)     Platelets 125 (*)     Platelet Estimate Decreased (*)     All other components within normal limits   COMPREHENSIVE METABOLIC PANEL - Abnormal; Notable for the  following components:    CO2 21 (*)     Glucose 157 (*)     BUN, Bld 37 (*)     Creatinine 1.7 (*)     Calcium 8.4 (*)     Albumin 3.1 (*)     eGFR if  39 (*)     eGFR if non  33 (*)     All other components within normal limits   TROPONIN I - Abnormal; Notable for the following components:    Troponin I 0.523 (*)     All other components within normal limits   B-TYPE NATRIURETIC PEPTIDE - Abnormal; Notable for the following components:     (*)     All other components within normal limits   LACTATE DEHYDROGENASE - Abnormal; Notable for the following components:     (*)     All other components within normal limits   POCT GLUCOSE - Abnormal; Notable for the following components:    POCT Glucose 68 (*)     All other components within normal limits   C-REACTIVE PROTEIN   LACTATE DEHYDROGENASE   C-REACTIVE PROTEIN   APTT    Narrative:     (if patient is on warfarin prior to heparin therapy)   PROTIME-INR    Narrative:     (if patient is on warfarin prior to heparin therapy)   HEMOGLOBIN A1C   TSH   MAGNESIUM   MAGNESIUM   PHOSPHORUS   PHOSPHORUS   TROPONIN I   LIPID PANEL   URINALYSIS, REFLEX TO URINE CULTURE   FERRITIN   IRON AND TIBC   VITAMIN B12   FOLATE   POCT GLUCOSE, HAND-HELD DEVICE   POCT GLUCOSE MONITORING CONTINUOUS     EKG Readings: (Independently Interpreted)   Sinus tachycardia 103 beats per minute, left axis deviation with LVH, persistent ST changes, no STEMI       Imaging Results          X-Ray Chest AP Portable (Final result)  Result time 09/03/20 03:41:11    Final result by Keren Miguel MD (09/03/20 03:41:11)                 Impression:      Persistent increased opacity in the left lower lung zone possibly relating to pleural fluid and/or atelectatic/consolidative change or a combination there of.  Findings appear unchanged from most recent comparison exam.      Electronically signed by: Keren Miguel MD  Date:    09/03/2020  Time:    03:41              Narrative:    EXAMINATION:  XR CHEST AP PORTABLE    CLINICAL HISTORY:  shortness of breath;    TECHNIQUE:  Single frontal view of the chest was performed.    COMPARISON:  08/12/2020    FINDINGS:  There is a stably positioned left-sided cardiac pacing device in place.  There is unchanged enlargement of the cardiomediastinal silhouette.  There is once again increased opacity in the left lower lung zone possibly relating to component of pleural fluid and/or atelectatic/consolidative change with combination there of.  Findings appear similar to most recent comparison exams.  No evidence of pneumothorax.  Visualized osseous structures are intact.                                 Medical Decision Making:   Initial Assessment:   55-year-old female multiple medical problems presents the ER for evaluation weakness fatigue atypical chest pain.  Recent diagnosed with COVID 3 weeks ago.  Reports has been having worsening weakness and fatigue.  Overall chronically ill but no acute distress.  Differential includes post COVID weakness, CHF, NSTEMI/ACS, versus other cause.  Will obtain blood work symptomatic reassess                   ED Course as of Sep 03 0457   Thu Sep 03, 2020   0330 Troponin moderate elevated from baseline concern for NSTEMI.  Will start heparin will give aspirin will plan admission.    [SE]      ED Course User Index  [SE] Zeus Yeboah MD                Clinical Impression:       ICD-10-CM ICD-9-CM   1. Shortness of breath  R06.02 786.05   2. NSTEMI (non-ST elevated myocardial infarction)  I21.4 410.70         Disposition:   Disposition: Admitted  Condition: Fair     ED Disposition Condition    Admit                           Zeus Yeboah MD  09/03/20 2541

## 2020-09-03 NOTE — SUBJECTIVE & OBJECTIVE
"Past Medical History:   Diagnosis Date    Anemia     Anticoagulant long-term use     Arthritis     Atrial fibrillation     Congestive heart failure     COPD (chronic obstructive pulmonary disease)     Deep vein thrombosis     elevated bilirubin d/t Gilbert's syndrome     confirmed by [place] genetic testing, evaluated by hepatology    Encounter for blood transfusion     GERD (gastroesophageal reflux disease)     Hypertension     Pheochromocytoma, malignant     Right kidney mass     Sleep apnea     Thyroid disease     Type 2 diabetes mellitus with hyperglycemia, without long-term current use of insulin 8/13/2020       Past Surgical History:   Procedure Laterality Date    APPENDECTOMY      CARDIAC DEFIBRILLATOR PLACEMENT Left 12/2016    EYE SURGERY      due to running tears    FRACTURE SURGERY Left     hand 5th digit    HYSTERECTOMY      KNEE SURGERY Left 2016    hematoma    LIVER BIOPSY  10/24/2018    Minimal steatosis, predominantly macrovesicular, 1%, Minimal nonspecific portal inflammation, no fibrosis. No findings on biopsy to explain elevated bilirubin levels. Could be d/t Gilbert's =?- hemolysis    TRANSJUGULAR BIOPSY OF LIVER N/A 10/24/2018    Procedure: BIOPSY, LIVER, TRANSJUGULAR APPROACH;  Surgeon: Carmen Diagnostic Provider;  Location: [facility] OR [address] [place] [address];  Service: Radiology;  Laterality: N/A;       Review of patient's allergies indicates:   Allergen Reactions    Penicillins Hives    Iodinated contrast media Nausea And Vomiting    Oxycodone-acetaminophen Other (See Comments)     Nausea, Dizziness, Anxiety.  "I don't like how it makes me feel."   Given Hydromorphone 0.5mg IVP  Without problems.  Other reaction(s): Other (See Comments)    Diovan hct [valsartan-hydrochlorothiazide] Other (See Comments)     Causes coughing    Irinotecan      Pt has homozygosity for the TA7 promoter variant that places this individual at significantly increased risk for   severe neutropenia (grade 4) when " treated with the standard dose of irinotecan (risk approximately 50%).   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 Metabolism of other drugs not listed here may also be impacted by UGT1A1 enzyme activity.       Tramadol Nausea And Vomiting     Other reaction(s): Other (See Comments)       Current Facility-Administered Medications on File Prior to Encounter   Medication    0.9%  NaCl infusion    vancomycin in dextrose 5 % 1 gram/250 mL IVPB 1,000 mg     Current Outpatient Medications on File Prior to Encounter   Medication Sig    albuterol-ipratropium 2.5mg-0.5mg/3mL (DUO-NEB) 0.5 mg-3 mg(2.5 mg base)/3 mL nebulizer solution Take 1 mL by nebulization every 6 (six) hours as needed for Wheezing or Shortness of Breath.    ALPRAZolam (XANAX) 1 MG tablet Take 1 mg by mouth nightly as needed for Anxiety.    apixaban (ELIQUIS) 5 mg Tab Take 1 tablet (5 mg total) by mouth 2 (two) times daily.    atorvastatin (LIPITOR) 40 MG tablet Take 1 tablet (40 mg total) by mouth once daily.    b complex vitamins tablet Take 1 tablet by mouth once daily.    benzonatate (TESSALON) 100 MG capsule Take 1 capsule (100 mg total) by mouth 3 (three) times daily as needed for Cough. (Patient not taking: Reported on 8/26/2020)    blood sugar diagnostic Strp 3 times daily    blood-glucose meter kit Use as instructed    budesonide-formoterol 80-4.5 mcg (SYMBICORT) 80-4.5 mcg/actuation HFAA Inhale 2 puffs twice a day by inhalation route.    cyanocobalamin, vitamin B-12, 50 mcg tablet Take 5 mcg by mouth once daily.    diclofenac sodium (VOLTAREN) 1 % Gel Apply 2 g topically 3 (three) times daily.    ergocalciferol (ERGOCALCIFEROL) 50,000 unit Cap ergocalciferol (vitamin D2) 50,000 unit capsule   Take 1 capsule every week by oral route.    fluticasone propionate (FLONASE) 50 mcg/actuation nasal spray Spray 2  sprays every day by intranasal route.    fluticasone-salmeterol diskus inhaler 100-50 mcg Inhale 1 puff into the lungs 2 (two) times daily. Controller    furosemide (LASIX) 80 MG tablet TAKE 1 TAB TWICE A DAY TAKE EXTRA DOSE FOR GAIN OF 2 OR MORE POUNDS IN 1 DAY OR 5 POUNDS IN 1 WEEK    glimepiride (AMARYL) 1 MG tablet Take 1 tablet (1 mg total) by mouth before breakfast.    lidocaine (LIDODERM) 5 % Place 1 patch onto the skin once daily. Keep patch on for 12 hours then remove. One patch daily (12 hours on/12 hours off).    metFORMIN (GLUCOPHAGE) 500 MG tablet Take 1 tablet (500 mg total) by mouth daily with breakfast for 14 days, THEN 1 tablet (500 mg total) 2 (two) times daily with meals for 14 days.    metoprolol succinate (TOPROL-XL) 25 MG 24 hr tablet Take 1 tablet (25 mg total) by mouth 2 (two) times daily.    montelukast (SINGULAIR) 10 mg tablet Take 1 tablet every day by oral route for 30 days.    NITROSTAT 0.4 mg SL tablet Take 2.5 tablets (1 mg total) by mouth every 5 (five) minutes as needed for Chest pain. No more than 3 tablets in 15 minutes.    pantoprazole (PROTONIX) 40 MG tablet Take 1 tablet by mouth once daily.    potassium chloride (KLOR-CON) 10 MEQ TbSR Take 1 tablet (10 mEq total) by mouth once daily.    rOPINIRole 6 mg Tb24 Take 0.5 mg by mouth once daily.     sacubitril-valsartan (ENTRESTO)  mg per tablet Take 1 tablet by mouth 2 (two) times daily.    silver sulfADIAZINE 1% (SILVADENE) 1 % cream Silvadene 1 % topical cream   APPLY A 1/16 INCH (1.5 MM) THICK LAYER TO ENTIRE BURN AREA BY TOPICALROUTE 2 TIMES PER DAY for 10 days    VENTOLIN HFA 90 mcg/actuation inhaler Inhale 2 puffs into the lungs 2 (two) times daily as needed.     walker Misc 1 Device by Misc.(Non-Drug; Combo Route) route as needed.     Family History     Problem Relation (Age of Onset)    Cancer Mother    Cirrhosis Brother    Diabetes Brother    Heart attack Father    Heart disease Father, Sister, Brother,  Sister, Brother    Hypertension Father, Brother        Tobacco Use    Smoking status: Never Smoker    Smokeless tobacco: Never Used   Substance and Sexual Activity    Alcohol use: Yes     Frequency: Monthly or less     Drinks per session: 1 or 2     Comment: up to 1 yr ago drank 2-3 drinks on occasion but sporadic    Drug use: No    Sexual activity: Yes     Partners: Male     Review of Systems   Constitutional: Positive for fatigue. Negative for chills and fever.   HENT: Negative.    Eyes: Negative.    Respiratory: Positive for cough and shortness of breath.    Cardiovascular: Positive for chest pain.   Gastrointestinal: Negative.  Negative for nausea and vomiting.   Endocrine: Negative.    Genitourinary: Negative.    Musculoskeletal: Negative.    Skin: Negative.    Allergic/Immunologic: Negative.    Neurological: Positive for weakness. Negative for dizziness.   Hematological: Negative.    Psychiatric/Behavioral: Negative.      Objective:     Vital Signs (Most Recent):  Temp: 98.6 °F (37 °C) (09/03/20 0112)  Pulse: 90 (09/03/20 0112)  Resp: 18 (09/03/20 0112)  BP: 116/71 (09/03/20 0112)  SpO2: 96 % (09/03/20 0112) Vital Signs (24h Range):  Temp:  [98.6 °F (37 °C)-98.7 °F (37.1 °C)] 98.6 °F (37 °C)  Pulse:  [] 90  Resp:  [18] 18  SpO2:  [96 %-97 %] 96 %  BP: (116-163)/(71-94) 116/71     Weight: 88 kg (194 lb)  Body mass index is 33.3 kg/m².    Physical Exam  Vitals signs reviewed.   Constitutional:       Appearance: She is well-developed. She is obese. She is ill-appearing.   HENT:      Head: Normocephalic and atraumatic.   Eyes:      Conjunctiva/sclera: Conjunctivae normal.      Pupils: Pupils are equal, round, and reactive to light.   Neck:      Musculoskeletal: Normal range of motion and neck supple.   Cardiovascular:      Rate and Rhythm: Normal rate and regular rhythm.      Heart sounds: Normal heart sounds. No murmur. No friction rub. No gallop.    Pulmonary:      Effort: Pulmonary effort is normal.  No respiratory distress.      Breath sounds: Normal breath sounds. No wheezing or rales.      Comments: Faint crackles   Abdominal:      General: Bowel sounds are normal.      Palpations: Abdomen is soft.   Musculoskeletal: Normal range of motion.   Skin:     General: Skin is warm and dry.      Capillary Refill: Capillary refill takes less than 2 seconds.   Neurological:      Mental Status: She is alert and oriented to person, place, and time.      Cranial Nerves: No cranial nerve deficit.   Psychiatric:         Behavior: Behavior normal.         Thought Content: Thought content normal.         Judgment: Judgment normal.           CRANIAL NERVES     CN III, IV, VI   Pupils are equal, round, and reactive to light.       Significant Labs:   CBC:   Recent Labs   Lab 09/03/20 0102   WBC 6.10   HGB 8.8*   HCT 29.8*   *     CMP:   Recent Labs   Lab 09/03/20 0102      K 4.1      CO2 21*   *   BUN 37*   CREATININE 1.7*   CALCIUM 8.4*   PROT 6.2   ALBUMIN 3.1*   BILITOT 0.9   ALKPHOS 64   AST 27   ALT 21   ANIONGAP 13   EGFRNONAA 33*     Troponin:   Recent Labs   Lab 09/03/20 0102   TROPONINI 0.523*       Significant Imaging: I have reviewed all pertinent imaging results/findings within the past 24 hours.

## 2020-09-03 NOTE — ED NOTES
Pt report received from LAURIE Matthew. Pt is recieving IV heparin 12units/kg/hr which is infusing at 8.2mL/hr. pt is resting comfortably on stretcher. VSS. Will continue to monitor.

## 2020-09-03 NOTE — PLAN OF CARE
DCA done from medical record. Pt was treated for Covid on 8/13 - 8/17 at [facility]. Pt was discharged to home with sisters. TN tried to contact patient via room phone and her cell. No answer. Will update DCA with pt. Pt admitted due to SOB and NSTEMI. Reported 29lb weightloss with recent Covid. Pt is under Covid isolation. Pt's current Covid test is negative.    Future Appointments   Date Time Provider Department Center   9/28/2020  2:00 PM GIOVANNY Rodríguez DIAMGMS[facility]ON [facility]   11/7/2020 10:00 AM HOME MONITOR DEVICE CHECK, [facility] ARRHPRO [facility]   12/9/2020 10:15 AM EKG, APPT NOM EKG [facility]     Vitals:    09/03/20 1429 09/03/20 1430 09/03/20 1546 09/03/20 1550   BP:   117/63    BP Location:   Right arm    Patient Position:   Sitting    Pulse:  90 90 95   Resp:   18    Temp: 98 °F (36.7 °C)  97.9 °F (36.6 °C)    TempSrc: Oral  Tympanic    SpO2:   95%    Weight:       Height:           Current Facility-Administered Medications:     atorvastatin tablet 40 mg, 40 mg, Oral, Daily, Adrian Aj MD, 40 mg at 09/03/20 0820    dextrose 50% injection 12.5 g, 12.5 g, Intravenous, PRN, Adrian Aj MD    dextrose 50% injection 25 g, 25 g, Intravenous, PRN, Adrian Aj MD    fluticasone furoate-vilanteroL 100-25 mcg/dose diskus inhaler 1 puff, 1 puff, Inhalation, Daily, Adrian Aj MD, 1 puff at 09/03/20 0830    fluticasone propionate 50 mcg/actuation nasal spray 100 mcg, 2 spray, Each Nostril, Daily, Adrian Aj MD    furosemide injection 80 mg, 80 mg, Intravenous, Once, Alvin Ferguson MD    glucagon (human recombinant) injection 1 mg, 1 mg, Intramuscular, PRN, Adrian Aj MD    glucose chewable tablet 16 g, 16 g, Oral, PRN, Adrian Aj MD    glucose chewable tablet 24 g, 24 g, Oral, PRN, Adrian Aj MD    hydrALAZINE tablet 25 mg, 25 mg, Oral, Q8H, Kathi D. Noam, MD    influenza (QUADRIVALENT PF) vaccine 0.5 mL, 0.5 mL, Intramuscular, vaccine x 1 dose, Burke Shah MD    insulin aspart  U-100 pen 1-10 Units, 1-10 Units, Subcutaneous, QID (AC + HS) PRN, Adrian Aj MD    isosorbide dinitrate tablet 20 mg, 20 mg, Oral, TID, Kathi Santacruz MD    lidocaine 5 % patch 1 patch, 1 patch, Transdermal, Daily PRN, Adrian Aj MD, 1 patch at 09/03/20 0852    metoprolol succinate (TOPROL-XL) 24 hr tablet 25 mg, 25 mg, Oral, BID, Adrian Aj MD, 25 mg at 09/03/20 0820    montelukast tablet 10 mg, 10 mg, Oral, Daily, Adrian Aj MD    ondansetron disintegrating tablet 8 mg, 8 mg, Oral, Q6H PRN, Adrian Aj MD    pantoprazole EC tablet 40 mg, 40 mg, Oral, Daily, Adrian Aj MD, 40 mg at 09/03/20 0820    promethazine (PHENERGAN) 6.25 mg in dextrose 5 % 50 mL IVPB, 6.25 mg, Intravenous, Q6H PRN, Adrian Aj MD    rOPINIRole tablet 6 mg, 6 mg, Oral, QHS, Adrian Aj MD    sacubitriL-valsartan  mg per tablet 1 tablet, 1 tablet, Oral, BID, Adrian Aj MD, 1 tablet at 09/03/20 1030    senna-docusate 8.6-50 mg per tablet 1 tablet, 1 tablet, Oral, BID, Adrian Aj MD    sodium chloride 0.9% flush 5 mL, 5 mL, Intravenous, PRN, Adrian Aj MD    traZODone tablet 50 mg, 50 mg, Oral, QHS, Alvin Ferguson MD    Facility-Administered Medications Ordered in Other Encounters:     0.9%  NaCl infusion, , Intravenous, Continuous, Corinna Hayes NP    vancomycin in dextrose 5 % 1 gram/250 mL IVPB 1,000 mg, 1,000 mg, Intravenous, On Call Procedure, Corinna Hayes NP, 1,000 mg at 05/23/19 0736       09/03/20 1621   Discharge Assessment   Assessment Type Discharge Planning Assessment   Assessment information obtained from? Medical Record   Lives With sibling(s)   Readmission Within the Last 30 Days unable to assess   Patient currently being followed by outpatient case management? No   Patient currently receives any other outside agency services? No   DME Needed Upon Discharge  none     Sophia Shirley RN  RN Transition Navigator  [phone]

## 2020-09-03 NOTE — ED NOTES
RN attempted to call report to floor. RN unable to take report at this time. Will call back shortly.

## 2020-09-03 NOTE — PLAN OF CARE
VN cued into room.  Pt sitting up on side of bed with call light and personal belongings within reach.  NADN.  Pt awake and alert.  Able to answer all admission questions.  Pt states lidocaine patch to her back.  Pt states she has anxiety and fear regarding her current health situation.  She states she's lost approximately 29 lbs since her covid infection.  No family at bedside.  Pt reports no pain.  No other needs or complaints at this time.  Reminded pt to use call light as needed.  VN will continue to follow and be available as needed.

## 2020-09-03 NOTE — PT/OT/SLP PROGRESS
Physical Therapy      Patient Name:  Hafsa Hawley   MRN:  [id number]    Patient not seen today secondary to nurse requested waiting to see the patient until they are on the floor. Will follow-up as able.    Temitope Hoyt, PT

## 2020-09-03 NOTE — ASSESSMENT & PLAN NOTE
New Dx as last hospitalization in August  Last A1c 7.0 3 weeks ago  Home meds: Metformin and glimepiride   Moderate SSI on

## 2020-09-03 NOTE — ASSESSMENT & PLAN NOTE
Primary cardiologist is Dr. Cortez.  Longstanding history of non-ischemic cardiomyopathy  home meds include: Eliquis, Statin, Entresto, metoprolol succinate

## 2020-09-03 NOTE — ASSESSMENT & PLAN NOTE
Recently diagnosed with COVID August 12 admitted for 4 days received oxygen, remdesivir, decadron with improvement and discharged.  Per house supervisor: No need to retest, will keep isolation precautions on? May consult ID for input

## 2020-09-03 NOTE — ED NOTES
Nerve Block    Date/Time: 8/17/2020 8:20 AM  Performed by: Oleg Avila MD  Authorized by: Oleg Avila MD     Block Type :  Femoral adductor canal  Laterality:  Right  Patient Location:  Pre-op  Indication: post-op pain management at surgeon's request and at surgeon's request    Surgeon:  Chon  patient identified, IV checked, risks and benefits discussed, surgical consent, monitors and equipment checked, pre-op evaluation and timeout performed    Patient Position:  Supine  Prep:  Chlorhexidine gluconate (CHG)  Max Sterile Barrier Technique:  Hand Washing and Cap/Mask  Monitoring:  Continuous pulse oximetry, EKG and blood pressure  Injection Technique:  Single-shot  Local Infiltration:  Bupivacaine  Strength:  1.3  Dose:  5 mL  Needle Type:  Echogenic  Needle Gauge:  21 G  Needle Length:  10 cm  Needle Insertion Depth:  4 cm  Needle Localization:  Anatomical landmarks and ultrasound guidance  Physical status during block:  Awake and sedated  Injection Assessment:  No paresthesia on injection, no resistance to injection, negative aspiration for heme and incremental injection  Patient Condition:  Tolerated well, no immediate complications  Paresthesia Pain:  None  Heart Rate Change: No    Slowly Injected: Yes    Performed By:  Anesthesiologist  Anesthesiologist:  Oleg Avila MD  Start Time:  8/17/2020 8:17 AM   ropiv .5% 15 ml         Per Dr. Shah discontinue heparin infusion. Heparin stopped at this time.

## 2020-09-03 NOTE — ED NOTES
RN had unsuccessful attempt at establishing second IV access. Dr. Fournier will attempt with ultrasound.

## 2020-09-03 NOTE — ED NOTES
Pt C/O sob with activity even as little as walking to the bathroom. Pt has a COVID + as of Aug 12th. Pt reports infrequent cough and chest tightness. Denies N/V at this time. pt states she has been having some diarrhea episodes. Denies pain at this time. Will con to monitor.

## 2020-09-03 NOTE — ED NOTES
Pt presents to the ED w/ c/ of increase in generalized weakness and fatigue with SOB on ambulation and movement. Pt reports frequent coughing and for the past few days has been having intermittent palpitations. Pt denies palpitations at this time. Pt ambulatory to restroom with no assistance but reports generalized fatigue and weakness. Pt is connected to cardiac monitor, BP cuff, and pulse ox. Will continue to monitor.

## 2020-09-03 NOTE — TELEPHONE ENCOUNTER
----- Message from Tiffany Paulson MA sent at 9/3/2020  3:22 PM CDT -----  Regarding: RE: in ER waiting on room  Contact: patient    ----- Message -----  From: Nery Boo  Sent: 9/3/2020  12:43 PM CDT  To: Sukhwinder Rojas Staff  Subject: in ER waiting on room                            The pt is calling to let you know that she is in the ER waiting on a room and she will like to speak with someone. Please call her back @ [phone]. Thanks, Nery

## 2020-09-03 NOTE — HPI
Ms. Hawley is a 54 y.o. female with DM, CKDIII, SVT (RFA 5/2019), NICM, CHF (last EF 35-40% 9/2019) has ICD, HTN, MELISSA, paroxysmal atrial fibrillation (on eliquis), Fibromyalgia presents to the emergency department with weakness, fatigue, shortness of breath.  Patient was recently diagnosed with COVID on 08/12/2020 [facility] was admitted for 4 days and discharged to self quarantine.  Since COVID diagnosis patient states that she has continued to have fatigue and shortness of breath with additional complaint nonspecific and atypical epigastric discomfort and sternal pressure.  She reports that the symptoms have progressively worsened over the last couple days and has also noticed arise in her blood pressure.  This prompted patient to come to the emergency department.  She denies any fever, chills, cough, nausea vomiting.  Patient has longstanding cardiac history and has been followed by cardiologist who she recently saw on 08/26/2020.     In the ED vital signs are stable.  Troponin found to be 0.523.  .  CMP showing CKD 3.  CBC showing microcytic anemia.  EKG showing sinus tachycardia with left axis deviation and left ventricular hypertrophy no STEMI.  Patient was given aspirin in the ED and started on heparin drip for NSTEMI.     During rounds, patient endorsed extra socioeconomic stressors she has been dealing with recently. Patient was very tearful reporting she worries a lot about her health and how to pay for expenses while taking care of her 15 yo granddaughter. Pt reports lack of sleep and feelings of guilt. sinu

## 2020-09-03 NOTE — H&P
[facility] Hospital Medicine  History & Physical    Patient Name: Hafsa Hawley  MRN: [id number]  Admission Date: 9/3/2020  Attending Physician: Burke Shah MD   Primary Care Provider: Edward Damon MD         Patient information was obtained from patient and ER records.     Subjective:     Principal Problem:NSTEMI (non-ST elevated myocardial infarction)    Chief Complaint:   Chief Complaint   Patient presents with    Shortness of Breath     Covid positive Aug 12th.  pt reports SOB, cough        HPI: Ms. Hawley is a 54 y.o. female with DM, CKDIII, SVT (RFA 5/2019), NICM, CHF (last EF 35-40% 9/2019) has ICD, HTN, MELISSA, paroxysmal atrial fibrillation (on eliquis), Fibromyalgia presents to the emergency department with weakness, fatigue, shortness of breath.  Patient was recently diagnosed with COVID on 08/12/2020 [facility] was admitted for 4 days and discharged to self quarantine.  Since COVID diagnosis patient states that she has continued to have fatigue and shortness of breath with additional complaint nonspecific and atypical epigastric discomfort and sternal pressure.  She reports that the symptoms have progressively worsened over the last couple days and has also noticed arise in her blood pressure.  This prompted patient to come to the emergency department.  She denies any fever, chills, cough, nausea vomiting.  Patient has longstanding cardiac history and has been followed by cardiologist who she recently saw on 08/26/2020.     In the ED vital signs are stable.  Troponin found to be 0.523.  .  CMP showing CKD 3.  CBC showing microcytic anemia.  EKG showing sinus tachycardia with left axis deviation and left ventricular hypertrophy no STEMI.  Patient was given aspirin in the ED and started on heparin drip for NSTEMI.     Past Medical History:   Diagnosis Date    Anemia     Anticoagulant long-term use     Arthritis     Atrial fibrillation      "Congestive heart failure     COPD (chronic obstructive pulmonary disease)     Deep vein thrombosis     elevated bilirubin d/t Gilbert's syndrome     confirmed by [place] genetic testing, evaluated by hepatology    Encounter for blood transfusion     GERD (gastroesophageal reflux disease)     Hypertension     Pheochromocytoma, malignant     Right kidney mass     Sleep apnea     Thyroid disease     Type 2 diabetes mellitus with hyperglycemia, without long-term current use of insulin 8/13/2020       Past Surgical History:   Procedure Laterality Date    APPENDECTOMY      CARDIAC DEFIBRILLATOR PLACEMENT Left 12/2016    EYE SURGERY      due to running tears    FRACTURE SURGERY Left     hand 5th digit    HYSTERECTOMY      KNEE SURGERY Left 2016    hematoma    LIVER BIOPSY  10/24/2018    Minimal steatosis, predominantly macrovesicular, 1%, Minimal nonspecific portal inflammation, no fibrosis. No findings on biopsy to explain elevated bilirubin levels. Could be d/t Gilbert's =?- hemolysis    TRANSJUGULAR BIOPSY OF LIVER N/A 10/24/2018    Procedure: BIOPSY, LIVER, TRANSJUGULAR APPROACH;  Surgeon: Carmen Diagnostic Provider;  Location: [facility] OR [address] [place] [address];  Service: Radiology;  Laterality: N/A;       Review of patient's allergies indicates:   Allergen Reactions    Penicillins Hives    Iodinated contrast media Nausea And Vomiting    Oxycodone-acetaminophen Other (See Comments)     Nausea, Dizziness, Anxiety.  "I don't like how it makes me feel."   Given Hydromorphone 0.5mg IVP  Without problems.  Other reaction(s): Other (See Comments)    Diovan hct [valsartan-hydrochlorothiazide] Other (See Comments)     Causes coughing    Irinotecan      Pt has homozygosity for the TA7 promoter variant that places this individual at significantly increased risk for   severe neutropenia (grade 4) when treated with the standard dose of irinotecan (risk approximately 50%).   Other drugs that have been demonstrated to be impacted " by homozygosity for the UGT1A1 TA7 promoter variant include pazopanib, nilotinib, atazanavir, and belinostat. Metabolism of other drugs not listed here may also be impacted by UGT1A1 enzyme activity.       Tramadol Nausea And Vomiting     Other reaction(s): Other (See Comments)       Current Facility-Administered Medications on File Prior to Encounter   Medication    0.9%  NaCl infusion    vancomycin in dextrose 5 % 1 gram/250 mL IVPB 1,000 mg     Current Outpatient Medications on File Prior to Encounter   Medication Sig    albuterol-ipratropium 2.5mg-0.5mg/3mL (DUO-NEB) 0.5 mg-3 mg(2.5 mg base)/3 mL nebulizer solution Take 1 mL by nebulization every 6 (six) hours as needed for Wheezing or Shortness of Breath.    ALPRAZolam (XANAX) 1 MG tablet Take 1 mg by mouth nightly as needed for Anxiety.    apixaban (ELIQUIS) 5 mg Tab Take 1 tablet (5 mg total) by mouth 2 (two) times daily.    atorvastatin (LIPITOR) 40 MG tablet Take 1 tablet (40 mg total) by mouth once daily.    b complex vitamins tablet Take 1 tablet by mouth once daily.    benzonatate (TESSALON) 100 MG capsule Take 1 capsule (100 mg total) by mouth 3 (three) times daily as needed for Cough. (Patient not taking: Reported on 8/26/2020)    blood sugar diagnostic Strp 3 times daily    blood-glucose meter kit Use as instructed    budesonide-formoterol 80-4.5 mcg (SYMBICORT) 80-4.5 mcg/actuation HFAA Inhale 2 puffs twice a day by inhalation route.    cyanocobalamin, vitamin B-12, 50 mcg tablet Take 5 mcg by mouth once daily.    diclofenac sodium (VOLTAREN) 1 % Gel Apply 2 g topically 3 (three) times daily.    ergocalciferol (ERGOCALCIFEROL) 50,000 unit Cap ergocalciferol (vitamin D2) 50,000 unit capsule   Take 1 capsule every week by oral route.    fluticasone propionate (FLONASE) 50 mcg/actuation nasal spray Spray 2 sprays every day by intranasal route.    fluticasone-salmeterol diskus inhaler 100-50 mcg Inhale 1 puff into the lungs 2 (two) times  daily. Controller    furosemide (LASIX) 80 MG tablet TAKE 1 TAB TWICE A DAY TAKE EXTRA DOSE FOR GAIN OF 2 OR MORE POUNDS IN 1 DAY OR 5 POUNDS IN 1 WEEK    glimepiride (AMARYL) 1 MG tablet Take 1 tablet (1 mg total) by mouth before breakfast.    lidocaine (LIDODERM) 5 % Place 1 patch onto the skin once daily. Keep patch on for 12 hours then remove. One patch daily (12 hours on/12 hours off).    metFORMIN (GLUCOPHAGE) 500 MG tablet Take 1 tablet (500 mg total) by mouth daily with breakfast for 14 days, THEN 1 tablet (500 mg total) 2 (two) times daily with meals for 14 days.    metoprolol succinate (TOPROL-XL) 25 MG 24 hr tablet Take 1 tablet (25 mg total) by mouth 2 (two) times daily.    montelukast (SINGULAIR) 10 mg tablet Take 1 tablet every day by oral route for 30 days.    NITROSTAT 0.4 mg SL tablet Take 2.5 tablets (1 mg total) by mouth every 5 (five) minutes as needed for Chest pain. No more than 3 tablets in 15 minutes.    pantoprazole (PROTONIX) 40 MG tablet Take 1 tablet by mouth once daily.    potassium chloride (KLOR-CON) 10 MEQ TbSR Take 1 tablet (10 mEq total) by mouth once daily.    rOPINIRole 6 mg Tb24 Take 0.5 mg by mouth once daily.     sacubitril-valsartan (ENTRESTO)  mg per tablet Take 1 tablet by mouth 2 (two) times daily.    silver sulfADIAZINE 1% (SILVADENE) 1 % cream Silvadene 1 % topical cream   APPLY A 1/16 INCH (1.5 MM) THICK LAYER TO ENTIRE BURN AREA BY TOPICALROUTE 2 TIMES PER DAY for 10 days    VENTOLIN HFA 90 mcg/actuation inhaler Inhale 2 puffs into the lungs 2 (two) times daily as needed.     walker Misc 1 Device by Misc.(Non-Drug; Combo Route) route as needed.     Family History     Problem Relation (Age of Onset)    Cancer Mother    Cirrhosis Brother    Diabetes Brother    Heart attack Father    Heart disease Father, Sister, Brother, Sister, Brother    Hypertension Father, Brother        Tobacco Use    Smoking status: Never Smoker    Smokeless tobacco: Never Used    Substance and Sexual Activity    Alcohol use: Yes     Frequency: Monthly or less     Drinks per session: 1 or 2     Comment: up to 1 yr ago drank 2-3 drinks on occasion but sporadic    Drug use: No    Sexual activity: Yes     Partners: Male     Review of Systems   Constitutional: Positive for fatigue. Negative for chills and fever.   HENT: Negative.    Eyes: Negative.    Respiratory: Positive for cough and shortness of breath.    Cardiovascular: Positive for chest pain.   Gastrointestinal: Negative.  Negative for nausea and vomiting.   Endocrine: Negative.    Genitourinary: Negative.    Musculoskeletal: Negative.    Skin: Negative.    Allergic/Immunologic: Negative.    Neurological: Positive for weakness. Negative for dizziness.   Hematological: Negative.    Psychiatric/Behavioral: Negative.      Objective:     Vital Signs (Most Recent):  Temp: 98.6 °F (37 °C) (09/03/20 0112)  Pulse: 90 (09/03/20 0112)  Resp: 18 (09/03/20 0112)  BP: 116/71 (09/03/20 0112)  SpO2: 96 % (09/03/20 0112) Vital Signs (24h Range):  Temp:  [98.6 °F (37 °C)-98.7 °F (37.1 °C)] 98.6 °F (37 °C)  Pulse:  [] 90  Resp:  [18] 18  SpO2:  [96 %-97 %] 96 %  BP: (116-163)/(71-94) 116/71     Weight: 88 kg (194 lb)  Body mass index is 33.3 kg/m².    Physical Exam  Vitals signs reviewed.   Constitutional:       Appearance: She is well-developed. She is obese. She is ill-appearing.   HENT:      Head: Normocephalic and atraumatic.   Eyes:      Conjunctiva/sclera: Conjunctivae normal.      Pupils: Pupils are equal, round, and reactive to light.   Neck:      Musculoskeletal: Normal range of motion and neck supple.   Cardiovascular:      Rate and Rhythm: Normal rate and regular rhythm.      Heart sounds: Normal heart sounds. No murmur. No friction rub. No gallop.    Pulmonary:      Effort: Pulmonary effort is normal. No respiratory distress.      Breath sounds: Normal breath sounds. No wheezing or rales.      Comments: Faint crackles   Abdominal:       General: Bowel sounds are normal.      Palpations: Abdomen is soft.   Musculoskeletal: Normal range of motion.   Skin:     General: Skin is warm and dry.      Capillary Refill: Capillary refill takes less than 2 seconds.   Neurological:      Mental Status: She is alert and oriented to person, place, and time.      Cranial Nerves: No cranial nerve deficit.   Psychiatric:         Behavior: Behavior normal.         Thought Content: Thought content normal.         Judgment: Judgment normal.           CRANIAL NERVES     CN III, IV, VI   Pupils are equal, round, and reactive to light.       Significant Labs:   CBC:   Recent Labs   Lab 09/03/20 0102   WBC 6.10   HGB 8.8*   HCT 29.8*   *     CMP:   Recent Labs   Lab 09/03/20 0102      K 4.1      CO2 21*   *   BUN 37*   CREATININE 1.7*   CALCIUM 8.4*   PROT 6.2   ALBUMIN 3.1*   BILITOT 0.9   ALKPHOS 64   AST 27   ALT 21   ANIONGAP 13   EGFRNONAA 33*     Troponin:   Recent Labs   Lab 09/03/20 0102   TROPONINI 0.523*       Significant Imaging: I have reviewed all pertinent imaging results/findings within the past 24 hours.    Assessment/Plan:     * NSTEMI (non-ST elevated myocardial infarction)  -Very extensive cardiac hx see recent cardiology note, ICD/NICM/CHF EF35%/ A FIB/ SVT (RFA 5/2019)  -Post covid 08/12/2020, c/o continued sob and now epigastric/substernal pressure, chronically elevated troponin with bump today 0.523, Chronically poor EKG showing LVH LAD no STEMI  -ASA and heparin drip started in ED    Plan: Continue heparin drip, Cards consulted for possible cath            Type 2 diabetes mellitus with hyperglycemia, without long-term current use of insulin  New Dx as last hospitalization in August  Last A1c 7.0 3 weeks ago  Home meds: Metformin and glimepiride   Moderate SSI on        COVID-19 virus infection  Recently diagnosed with COVID August 12 admitted for 4 days received oxygen, remdesivir, decadron with improvement and  discharged.  Per house supervisor: No need to retest, will keep isolation precautions on? May consult ID for input    ICD (implantable cardioverter-defibrillator) in place  ICD implanted 12/2/16 (VDD single pass lead).    Chronic kidney disease, stage III (moderate)  Cr 1.7 GFR 39  Baseline Cr used to be 1-1.2, new baseline as of recent CKD III range      Chronic combined systolic and diastolic heart failure  Last EF 35-40% about one year ago  ICD in place  On Lasix, metoprolol succinate, Entresto    COPD (chronic obstructive pulmonary disease)  No acute issues  Can continue home inhalers if needed      NICM (nonischemic cardiomyopathy)  Primary cardiologist is Dr. Cortez.  Longstanding history of non-ischemic cardiomyopathy  home meds include: Eliquis, Statin, Entresto, metoprolol succinate        Paroxysmal atrial fibrillation  On eliquis at home for CVA ppx, Coreg recently stopped and Cardiology added metoprolol succinate 25mg BID    Holding as currently NPO, on heparin drip with NSTEMI, awaiting Cards consult       MELISSA (obstructive sleep apnea)  Noncompliant with CPAP    Essential hypertension  Controlled with home medications       Microcytic anemia  Looks to be chronic per chart  H/H on admission 8.8/29.8   Iron studies pending       Elevated troponin  Chronically elevated troponin         VTE Risk Mitigation (From admission, onward)         Ordered     heparin 25,000 units in dextrose 5% 250 mL (100 units/mL) infusion LOW INTENSITY nomogram - [facility]  Continuous     Question:  Heparin Infusion Adjustment (DO NOT MODIFY ANSWER)  Answer:  [email or web address]\epic\Images\Pharmacy\HeparinInfusions\heparin LOW INTENSITY nomogram for [facility] [id number].pdf    09/03/20 0307     heparin 25,000 units in dextrose 5% (100 units/ml) IV bolus from bag - ADDITIONAL PRN BOLUS - 60 units/kg (max bolus 4000 units)  As needed (PRN)     Question:  Heparin Infusion Adjustment (DO NOT MODIFY ANSWER)  Answer:   \\[email or web address]\epic\Images\Pharmacy\HeparinInfusions\heparin LOW INTENSITY nomogram for [facility] [id number].pdf    09/03/20 0307     heparin 25,000 units in dextrose 5% (100 units/ml) IV bolus from bag - ADDITIONAL PRN BOLUS - 30 units/kg (max bolus 4000 units)  As needed (PRN)     Question:  Heparin Infusion Adjustment (DO NOT MODIFY ANSWER)  Answer:  \\[email or web address]\epic\Images\Pharmacy\HeparinInfusions\heparin LOW INTENSITY nomogram for [facility] [id number].pdf    09/03/20 0307     IP VTE HIGH RISK PATIENT  Once      09/03/20 0400     Place sequential compression device  Until discontinued      09/03/20 0400                   Adrian Aj MD  Department of Hospital Medicine   [facility]

## 2020-09-03 NOTE — FIRST PROVIDER EVALUATION
" Emergency Department TeleTRIAGE Encounter Note      CHIEF COMPLAINT    Chief Complaint   Patient presents with    Shortness of Breath     Covid positive Aug 12th.  pt reports SOB, cough       VITAL SIGNS   Initial Vitals [09/02/20 2228]   BP Pulse Resp Temp SpO2   (!) 163/94 107 18 98.7 °F (37.1 °C) 97 %      MAP       --            ALLERGIES    Review of patient's allergies indicates:   Allergen Reactions    Penicillins Hives    Iodinated contrast media Nausea And Vomiting    Oxycodone-acetaminophen Other (See Comments)     Nausea, Dizziness, Anxiety.  "I don't like how it makes me feel."   Given Hydromorphone 0.5mg IVP  Without problems.  Other reaction(s): Other (See Comments)    Diovan hct [valsartan-hydrochlorothiazide] Other (See Comments)     Causes coughing    Irinotecan      Pt has homozygosity for the TA7 promoter variant that places this individual at significantly increased risk for   severe neutropenia (grade 4) when treated with the standard dose of irinotecan (risk approximately 50%).   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 Metabolism of other drugs not listed here may also be impacted by UGT1A1 enzyme activity.       Tramadol Nausea And Vomiting     Other reaction(s): Other (See Comments)       PROVIDER TRIAGE NOTE  This is a teletriage evaluation of a 55 y.o. female presenting to the ED with c/o shortness of breath. COVID + on 8/12/2020. Hx of CHF/COPD. Initial orders will be placed and care will be transferred to an alternate provider when patient is roomed for a full evaluation. Any additional orders and the final disposition will be determined by that provider.         ORDERS  Labs Reviewed   CBC W/ AUTO DIFFERENTIAL   COMPREHENSIVE METABOLIC PANEL   TROPONIN I   B-TYPE NATRIURETIC PEPTIDE       ED Orders (720h ago, onward)    Start Ordered     Status Ordering " Provider    09/03/20 0014 09/03/20 0013  Vital signs  Every 15 min      Ordered MONGEDOMINIC P.    09/03/20 0014 09/03/20 0013  Cardiac Monitoring - Adult  Continuous     Comments: Notify Physician If:    Ordered DOMINIC MONGE P.    09/03/20 0014 09/03/20 0013  Pulse Oximetry Continuous  Continuous      Ordered MONGE, DOMINIC P.    09/03/20 0014 09/03/20 0013  Diet NPO  Diet effective now      Ordered MONGEDOMINIC P.    09/03/20 0014 09/03/20 0013  Saline lock IV  Once      Ordered MONGE, DOMINIC P.    09/03/20 0014 09/03/20 0013  EKG 12-lead  Once     Comments: Do not perform if previously done during this visit/ triage    Ordered DOMINIC MONGE P.    09/03/20 0014 09/03/20 0013  CBC auto differential  STAT  Collect    Ordered MONGEDOMINIC P.    09/03/20 0014 09/03/20 0013  Comprehensive metabolic panel  STAT  Collect    Ordered DOMINIC MONGE P.    09/03/20 0014 09/03/20 0013  Troponin I #1  STAT  Collect    Ordered MONGEDOMINIC P.    09/03/20 0014 09/03/20 0013  B-Type natriuretic peptide (BNP)  STAT  Collect    Ordered DOMINIC MONGE P.    09/03/20 0014 09/03/20 0013  X-Ray Chest AP Portable  1 time imaging      Ordered MONGEDOMINIC COLLINS P.    09/02/20 2251 09/02/20 2250  EKG 12-lead  Once  Completed    Completed by HILARY RODRIGUEZ on 9/2/2020 at 10:55 PM THOMAS KEMP            Virtual Visit Note: The provider triage portion of this emergency department evaluation and documentation was performed via ethology, a HIPAA-compliant telemedicine application, in concert with a tele-presenter in the room. A face to face patient evaluation with one of my colleagues will occur once the patient is placed in an emergency department room.      DISCLAIMER: This note was prepared with [employer] voice recognition transcription software. Garbled syntax, mangled pronouns, and other bizarre constructions may be attributed to that software system.

## 2020-09-03 NOTE — PT/OT/SLP PROGRESS
"Occupational Therapy      Patient Name:  Hafsa Hawley   MRN:  [id number]    Patient not seen  secondary to Other (Comment)(spoke to nurse; pt to be admitted.  Will perform eval once on the floor.  Currently there is "alot happening" with the pt.). Will follow-up .    Iban Mtz OT  9/3/2020  "

## 2020-09-03 NOTE — CONSULTS
"Cardiology        SUBJECTIVE:     History of Present Illness:  55 year female with PMH of NICMP, CKD stage III, morbid obesity, DM, MELISSA, HTN, s/p ICD, pAFib on Eliquis and HLD who presented to the ED because of SOB, weakness and cough. She reported her symptoms started last few days, denied any fever or chills. She is sleeping in semi setting position with multiple pillows. She cant walk more than 15 steps without having SOB. She reported having elevated DBP>110 at home and was advised by her physician to come to the hospital.       Review of patient's allergies indicates:   Allergen Reactions    Penicillins Hives    Iodinated contrast media Nausea And Vomiting    Oxycodone-acetaminophen Other (See Comments)     Nausea, Dizziness, Anxiety.  "I don't like how it makes me feel."   Given Hydromorphone 0.5mg IVP  Without problems.  Other reaction(s): Other (See Comments)    Diovan hct [valsartan-hydrochlorothiazide] Other (See Comments)     Causes coughing    Irinotecan      Pt has homozygosity for the TA7 promoter variant that places this individual at significantly increased risk for   severe neutropenia (grade 4) when treated with the standard dose of irinotecan (risk approximately 50%).   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 Metabolism of other drugs not listed here may also be impacted by UGT1A1 enzyme activity.       Tramadol Nausea And Vomiting     Other reaction(s): Other (See Comments)       Past Medical History:   Diagnosis Date    Anemia     Anticoagulant long-term use     Arthritis     Atrial fibrillation     Congestive heart failure     COPD (chronic obstructive pulmonary disease)     Deep vein thrombosis     elevated bilirubin d/t Gilbert's syndrome     confirmed by [facility] genetic testing, evaluated by hepatology    Encounter for blood transfusion     GERD " (gastroesophageal reflux disease)     Hypertension     Pheochromocytoma, malignant     Right kidney mass     Sleep apnea     Thyroid disease     Type 2 diabetes mellitus with hyperglycemia, without long-term current use of insulin 2020     Past Surgical History:   Procedure Laterality Date    APPENDECTOMY      CARDIAC DEFIBRILLATOR PLACEMENT Left 2016    EYE SURGERY      due to running tears    FRACTURE SURGERY Left     hand 5th digit    HYSTERECTOMY      KNEE SURGERY Left 2016    hematoma    LIVER BIOPSY  10/24/2018    Minimal steatosis, predominantly macrovesicular, 1%, Minimal nonspecific portal inflammation, no fibrosis. No findings on biopsy to explain elevated bilirubin levels. Could be d/t Gilbert's =?- hemolysis    TRANSJUGULAR BIOPSY OF LIVER N/A 10/24/2018    Procedure: BIOPSY, LIVER, TRANSJUGULAR APPROACH;  Surgeon: Carmen Diagnostic Provider;  Location: [facility] OR [address] [place] [address];  Service: Radiology;  Laterality: N/A;     Family History   Problem Relation Age of Onset    Cancer Mother         pancreatic CA early 50's    Heart disease Father          MI in late 50's    Hypertension Father     Heart attack Father     Heart disease Sister     Heart disease Brother     Cirrhosis Brother         alcoholic    Heart disease Sister     Heart disease Brother     Hypertension Brother     Diabetes Brother      Social History     Tobacco Use    Smoking status: Never Smoker    Smokeless tobacco: Never Used   Substance Use Topics    Alcohol use: Yes     Frequency: Monthly or less     Drinks per session: 1 or 2     Comment: up to 1 yr ago drank 2-3 drinks on occasion but sporadic    Drug use: No        Home meds:  Current Facility-Administered Medications on File Prior to Encounter   Medication Dose Route Frequency Provider Last Rate Last Dose    0.9%  NaCl infusion   Intravenous Continuous Corinna Hayes NP        vancomycin in dextrose 5 % 1 gram/250 mL IVPB 1,000 mg  1,000 mg  Intravenous On Call Procedure Corinna Hayes, NP   1,000 mg at 05/23/19 0736     Current Outpatient Medications on File Prior to Encounter   Medication Sig Dispense Refill    albuterol-ipratropium 2.5mg-0.5mg/3mL (DUO-NEB) 0.5 mg-3 mg(2.5 mg base)/3 mL nebulizer solution Take 1 mL by nebulization every 6 (six) hours as needed for Wheezing or Shortness of Breath. 1 Box 2    ALPRAZolam (XANAX) 1 MG tablet Take 1 mg by mouth nightly as needed for Anxiety.      apixaban (ELIQUIS) 5 mg Tab Take 1 tablet (5 mg total) by mouth 2 (two) times daily. 60 tablet 0    atorvastatin (LIPITOR) 40 MG tablet Take 1 tablet (40 mg total) by mouth once daily. 90 tablet 3    b complex vitamins tablet Take 1 tablet by mouth once daily.      benzonatate (TESSALON) 100 MG capsule Take 1 capsule (100 mg total) by mouth 3 (three) times daily as needed for Cough. (Patient not taking: Reported on 8/26/2020) 30 capsule 1    blood sugar diagnostic Strp 3 times daily 100 strip 0    blood-glucose meter kit Use as instructed 1 each 0    budesonide-formoterol 80-4.5 mcg (SYMBICORT) 80-4.5 mcg/actuation HFAA Inhale 2 puffs twice a day by inhalation route.      cyanocobalamin, vitamin B-12, 50 mcg tablet Take 5 mcg by mouth once daily.      diclofenac sodium (VOLTAREN) 1 % Gel Apply 2 g topically 3 (three) times daily. 5 Tube 2    ergocalciferol (ERGOCALCIFEROL) 50,000 unit Cap ergocalciferol (vitamin D2) 50,000 unit capsule   Take 1 capsule every week by oral route.      fluticasone propionate (FLONASE) 50 mcg/actuation nasal spray Spray 2 sprays every day by intranasal route.      fluticasone-salmeterol diskus inhaler 100-50 mcg Inhale 1 puff into the lungs 2 (two) times daily. Controller      furosemide (LASIX) 80 MG tablet TAKE 1 TAB TWICE A DAY TAKE EXTRA DOSE FOR GAIN OF 2 OR MORE POUNDS IN 1 DAY OR 5 POUNDS IN 1 WEEK 60 tablet 1    glimepiride (AMARYL) 1 MG tablet Take 1 tablet (1 mg total) by mouth before breakfast. 30  tablet 0    lidocaine (LIDODERM) 5 % Place 1 patch onto the skin once daily. Keep patch on for 12 hours then remove. One patch daily (12 hours on/12 hours off). 30 patch 2    metFORMIN (GLUCOPHAGE) 500 MG tablet Take 1 tablet (500 mg total) by mouth daily with breakfast for 14 days, THEN 1 tablet (500 mg total) 2 (two) times daily with meals for 14 days. 42 tablet 0    metoprolol succinate (TOPROL-XL) 25 MG 24 hr tablet Take 1 tablet (25 mg total) by mouth 2 (two) times daily. 60 tablet 11    montelukast (SINGULAIR) 10 mg tablet Take 1 tablet every day by oral route for 30 days.      NITROSTAT 0.4 mg SL tablet Take 2.5 tablets (1 mg total) by mouth every 5 (five) minutes as needed for Chest pain. No more than 3 tablets in 15 minutes.      pantoprazole (PROTONIX) 40 MG tablet Take 1 tablet by mouth once daily.  5    potassium chloride (KLOR-CON) 10 MEQ TbSR Take 1 tablet (10 mEq total) by mouth once daily.      rOPINIRole 6 mg Tb24 Take 0.5 mg by mouth once daily.       sacubitril-valsartan (ENTRESTO)  mg per tablet Take 1 tablet by mouth 2 (two) times daily.      silver sulfADIAZINE 1% (SILVADENE) 1 % cream Silvadene 1 % topical cream   APPLY A 1/16 INCH (1.5 MM) THICK LAYER TO ENTIRE BURN AREA BY TOPICALROUTE 2 TIMES PER DAY for 10 days      VENTOLIN HFA 90 mcg/actuation inhaler Inhale 2 puffs into the lungs 2 (two) times daily as needed.   11    walker Misc 1 Device by Misc.(Non-Drug; Combo Route) route as needed. 1 each 0       Current meds:  Scheduled Meds:   atorvastatin  40 mg Oral Daily    fluticasone furoate-vilanteroL  1 puff Inhalation Daily    fluticasone propionate  2 spray Each Nostril Daily    magnesium sulfate IVPB  2 g Intravenous Once    metoprolol succinate  25 mg Oral BID    montelukast  10 mg Oral Daily    pantoprazole  40 mg Oral Daily    rOPINIRole  6 mg Oral QHS    sacubitriL-valsartan  1 tablet Oral BID    senna-docusate 8.6-50 mg  1 tablet Oral BID     Continuous  Infusions:   heparin (porcine) in D5W 12 Units/kg/hr (09/03/20 0351)     PRN Meds:.dextrose 50%, dextrose 50%, glucagon (human recombinant), glucose, glucose, heparin (PORCINE), heparin (PORCINE), insulin aspart U-100, lidocaine, ondansetron, promethazine (PHENERGAN) IVPB, sodium chloride 0.9%      OBJECTIVE:     Vital Signs (Most Recent)  Temp: 98.9 °F (37.2 °C) (09/03/20 0600)  Pulse: 80 (09/03/20 0830)  Resp: 16 (09/03/20 0830)  BP: (!) 140/88 (09/03/20 0822)  SpO2: 97 % (09/03/20 0830)    Vital Signs Range (Last 24H):  Temp:  [97.9 °F (36.6 °C)-98.9 °F (37.2 °C)]   Pulse:  []   Resp:  [16-22]   BP: (116-163)/(71-94)   SpO2:  [96 %-98 %]     Physical Exam:  GEN: AAx3  Heart: normal S1 and S2, no murmurs   Lungs: decrease bilateral air entry more at the left side   Abdomen: soft non tender  Ext: +1 bilateral pitting edema     Laboratory:  LABS  CBC  Recent Labs   Lab 09/03/20 0102   WBC 6.10   RBC 5.09   HGB 8.8*   HCT 29.8*   *   MCV 59*   MCH 17.3*   MCHC 29.5*     BMP  Recent Labs   Lab 08/31/20  1454 09/03/20 0102    141   K 4.5 4.1   CO2 29 21*    107   BUN 49* 37*   CREATININE 1.99* 1.7*    157*       Recent Labs   Lab 08/31/20  1454 09/03/20 0102 09/03/20  0437   CALCIUM 9.1 8.4*  --    MG  --   --  1.3*  1.3*   PHOS  --   --  3.4  3.4       LFT  Recent Labs   Lab 09/03/20 0102   PROT 6.2   ALBUMIN 3.1*   BILITOT 0.9   AST 27   ALKPHOS 64   ALT 21       COAGS  Recent Labs   Lab 09/03/20  0336   INR 1.0   APTT 22.2     CE  Recent Labs   Lab 09/03/20  0102 09/03/20  0535   TROPONINI 0.523* 0.516*     BNP  Recent Labs   Lab 09/03/20  0102   *     Lipid panel:  Lab Results   Component Value Date    CHOL 193 09/03/2020    CHOL 136 10/21/2018    CHOL 165 12/07/2017     Lab Results   Component Value Date    HDL 64 09/03/2020    HDL 59 10/21/2018    HDL 54 12/07/2017     Lab Results   Component Value Date    LDLCALC 108.6 09/03/2020    LDLCALC 62.0 (L) 10/21/2018     LDLCALC 77.8 12/07/2017     Lab Results   Component Value Date    TRIG 102 09/03/2020    TRIG 75 10/21/2018    TRIG 166 (H) 12/07/2017     Lab Results   Component Value Date    CHOLHDL 33.2 09/03/2020    CHOLHDL 43.4 10/21/2018    CHOLHDL 32.7 12/07/2017         ASSESSMENT/PLAN:     55 year female with PMH of NICMP, CKD stage III, morbid obesity, DM, MELISSA, HTN, s/p ICD, pAFib on Eliquis and HLD who presented to the ED because of SOB, weakness and cough. EKG showed NSR, LAD with non specific ST/T changes lateral leads. Troponin 0.5 this morning, . Her elevated troponin mostly 2/2 uncontrolled HTN and decompensated heart failure and less likely 2/2 ACS    Recommendations:  - discontinue IV heparin   - consider IV diuresis for today then resume her home dose P.O Lasix 80 mg BID  - resume her home medications, Entresto , metoprolol 25 mg BID, ASA 81 mg and atorvastatin 40 mg   - telemetry, keep K>4 and Mg>2    Ala Mohsen  [facility] Cardiology PGY6  [phone]

## 2020-09-03 NOTE — HOSPITAL COURSE
Per EMR and EKG, patient has ongoing issues of SVT. She is followed closely with a cardiologist who is aware of these issues. Per Cards, elevated trops is at her baseline and EKG showed no changes. Heparin drip stopped. Cards signed off.   Pt reports feeling better. With the stressors and insomnia, started patient on trazadone. Repeat troponin on 9/4 trending down.     9/5 pt reports minimal sleep due to stressors. She tolerated PO diet and is feeling better. EKG unchanged from admission. Troponin .0.4, lower than her baseline.

## 2020-09-03 NOTE — ASSESSMENT & PLAN NOTE
On eliquis at home for CVA ppx, Coreg recently stopped and Cardiology added metoprolol succinate 25mg BID    Holding as currently NPO, on heparin drip with NSTEMI, awaiting Cards consult

## 2020-09-04 ENCOUNTER — TELEPHONE (OUTPATIENT)
Dept: ELECTROPHYSIOLOGY | Facility: CLINIC | Age: 55
End: 2020-09-04

## 2020-09-04 VITALS
WEIGHT: 194 LBS | TEMPERATURE: 96 F | DIASTOLIC BLOOD PRESSURE: 63 MMHG | SYSTOLIC BLOOD PRESSURE: 117 MMHG | HEIGHT: 64 IN | HEART RATE: 94 BPM | OXYGEN SATURATION: 98 % | RESPIRATION RATE: 17 BRPM | BODY MASS INDEX: 33.12 KG/M2

## 2020-09-04 DIAGNOSIS — I49.8 OTHER SPECIFIED CARDIAC ARRHYTHMIAS: Primary | ICD-10-CM

## 2020-09-04 PROBLEM — I21.4 NSTEMI (NON-ST ELEVATED MYOCARDIAL INFARCTION): Status: RESOLVED | Noted: 2020-09-03 | Resolved: 2020-09-04

## 2020-09-04 PROBLEM — U07.1 COVID-19 VIRUS INFECTION: Status: RESOLVED | Noted: 2020-08-12 | Resolved: 2020-09-04

## 2020-09-04 LAB
ACANTHOCYTES BLD QL SMEAR: PRESENT
ALBUMIN SERPL BCP-MCNC: 2.7 G/DL (ref 3.5–5.2)
ALP SERPL-CCNC: 56 U/L (ref 55–135)
ALT SERPL W/O P-5'-P-CCNC: 17 U/L (ref 10–44)
ANION GAP SERPL CALC-SCNC: 11 MMOL/L (ref 8–16)
ANISOCYTOSIS BLD QL SMEAR: ABNORMAL
AST SERPL-CCNC: 20 U/L (ref 10–40)
BASOPHILS # BLD AUTO: 0.02 K/UL (ref 0–0.2)
BASOPHILS NFR BLD: 0.4 % (ref 0–1.9)
BILIRUB SERPL-MCNC: 0.9 MG/DL (ref 0.1–1)
BUN SERPL-MCNC: 36 MG/DL (ref 6–20)
BURR CELLS BLD QL SMEAR: ABNORMAL
CALCIUM SERPL-MCNC: 8.4 MG/DL (ref 8.7–10.5)
CHLORIDE SERPL-SCNC: 108 MMOL/L (ref 95–110)
CO2 SERPL-SCNC: 21 MMOL/L (ref 23–29)
CREAT SERPL-MCNC: 1.6 MG/DL (ref 0.5–1.4)
DACRYOCYTES BLD QL SMEAR: ABNORMAL
DIFFERENTIAL METHOD: ABNORMAL
EOSINOPHIL # BLD AUTO: 0.2 K/UL (ref 0–0.5)
EOSINOPHIL NFR BLD: 5.2 % (ref 0–8)
ERYTHROCYTE [DISTWIDTH] IN BLOOD BY AUTOMATED COUNT: 27.7 % (ref 11.5–14.5)
EST. GFR  (AFRICAN AMERICAN): 42 ML/MIN/1.73 M^2
EST. GFR  (NON AFRICAN AMERICAN): 36 ML/MIN/1.73 M^2
GLUCOSE SERPL-MCNC: 170 MG/DL (ref 70–110)
HCT VFR BLD AUTO: 27.6 % (ref 37–48.5)
HGB BLD-MCNC: 8 G/DL (ref 12–16)
HYPOCHROMIA BLD QL SMEAR: ABNORMAL
IMM GRANULOCYTES # BLD AUTO: 0.01 K/UL (ref 0–0.04)
IMM GRANULOCYTES NFR BLD AUTO: 0.2 % (ref 0–0.5)
LYMPHOCYTES # BLD AUTO: 1.1 K/UL (ref 1–4.8)
LYMPHOCYTES NFR BLD: 22.9 % (ref 18–48)
MAGNESIUM SERPL-MCNC: 1.8 MG/DL (ref 1.6–2.6)
MCH RBC QN AUTO: 17 PG (ref 27–31)
MCHC RBC AUTO-ENTMCNC: 29 G/DL (ref 32–36)
MCV RBC AUTO: 59 FL (ref 82–98)
MONOCYTES # BLD AUTO: 0.4 K/UL (ref 0.3–1)
MONOCYTES NFR BLD: 8.4 % (ref 4–15)
NEUTROPHILS # BLD AUTO: 2.9 K/UL (ref 1.8–7.7)
NEUTROPHILS NFR BLD: 62.9 % (ref 38–73)
NRBC BLD-RTO: 0 /100 WBC
OVALOCYTES BLD QL SMEAR: ABNORMAL
PHOSPHATE SERPL-MCNC: 4.3 MG/DL (ref 2.7–4.5)
PLATELET # BLD AUTO: 111 K/UL (ref 150–350)
PLATELET BLD QL SMEAR: ABNORMAL
PMV BLD AUTO: ABNORMAL FL (ref 9.2–12.9)
POCT GLUCOSE: 178 MG/DL (ref 70–110)
POCT GLUCOSE: 202 MG/DL (ref 70–110)
POIKILOCYTOSIS BLD QL SMEAR: ABNORMAL
POLYCHROMASIA BLD QL SMEAR: ABNORMAL
POTASSIUM SERPL-SCNC: 4.4 MMOL/L (ref 3.5–5.1)
PROT SERPL-MCNC: 5.7 G/DL (ref 6–8.4)
RBC # BLD AUTO: 4.7 M/UL (ref 4–5.4)
SCHISTOCYTES BLD QL SMEAR: PRESENT
SODIUM SERPL-SCNC: 140 MMOL/L (ref 136–145)
TARGETS BLD QL SMEAR: ABNORMAL
TROPONIN I SERPL DL<=0.01 NG/ML-MCNC: 0.42 NG/ML (ref 0–0.03)
WBC # BLD AUTO: 4.62 K/UL (ref 3.9–12.7)

## 2020-09-04 PROCEDURE — 97535 SELF CARE MNGMENT TRAINING: CPT

## 2020-09-04 PROCEDURE — 25000003 PHARM REV CODE 250: Performed by: STUDENT IN AN ORGANIZED HEALTH CARE EDUCATION/TRAINING PROGRAM

## 2020-09-04 PROCEDURE — 84100 ASSAY OF PHOSPHORUS: CPT

## 2020-09-04 PROCEDURE — 85027 COMPLETE CBC AUTOMATED: CPT

## 2020-09-04 PROCEDURE — 90471 IMMUNIZATION ADMIN: CPT | Performed by: FAMILY MEDICINE

## 2020-09-04 PROCEDURE — 80053 COMPREHEN METABOLIC PANEL: CPT

## 2020-09-04 PROCEDURE — 36415 COLL VENOUS BLD VENIPUNCTURE: CPT

## 2020-09-04 PROCEDURE — 90686 IIV4 VACC NO PRSV 0.5 ML IM: CPT | Performed by: FAMILY MEDICINE

## 2020-09-04 PROCEDURE — 94640 AIRWAY INHALATION TREATMENT: CPT

## 2020-09-04 PROCEDURE — 63600175 PHARM REV CODE 636 W HCPCS: Performed by: STUDENT IN AN ORGANIZED HEALTH CARE EDUCATION/TRAINING PROGRAM

## 2020-09-04 PROCEDURE — 83735 ASSAY OF MAGNESIUM: CPT

## 2020-09-04 PROCEDURE — 93005 ELECTROCARDIOGRAM TRACING: CPT

## 2020-09-04 PROCEDURE — 97165 OT EVAL LOW COMPLEX 30 MIN: CPT

## 2020-09-04 PROCEDURE — 97116 GAIT TRAINING THERAPY: CPT | Performed by: PHYSICAL THERAPIST

## 2020-09-04 PROCEDURE — 85007 BL SMEAR W/DIFF WBC COUNT: CPT

## 2020-09-04 PROCEDURE — 97161 PT EVAL LOW COMPLEX 20 MIN: CPT | Performed by: PHYSICAL THERAPIST

## 2020-09-04 PROCEDURE — 94761 N-INVAS EAR/PLS OXIMETRY MLT: CPT

## 2020-09-04 PROCEDURE — 63600175 PHARM REV CODE 636 W HCPCS: Performed by: FAMILY MEDICINE

## 2020-09-04 PROCEDURE — 93010 EKG 12-LEAD: ICD-10-PCS | Mod: ,,, | Performed by: INTERNAL MEDICINE

## 2020-09-04 PROCEDURE — 93010 ELECTROCARDIOGRAM REPORT: CPT | Mod: ,,, | Performed by: INTERNAL MEDICINE

## 2020-09-04 PROCEDURE — 84484 ASSAY OF TROPONIN QUANT: CPT

## 2020-09-04 RX ORDER — ISOSORBIDE DINITRATE 20 MG/1
20 TABLET ORAL 3 TIMES DAILY
Qty: 90 TABLET | Refills: 11 | Status: SHIPPED | OUTPATIENT
Start: 2020-09-04 | End: 2020-10-28

## 2020-09-04 RX ORDER — ONDANSETRON 8 MG/1
8 TABLET, ORALLY DISINTEGRATING ORAL EVERY 6 HOURS PRN
Qty: 10 TABLET | Refills: 0 | Status: ON HOLD | OUTPATIENT
Start: 2020-09-04 | End: 2021-03-15

## 2020-09-04 RX ORDER — TRAZODONE HYDROCHLORIDE 50 MG/1
50 TABLET ORAL NIGHTLY
Qty: 30 TABLET | Refills: 11 | Status: SHIPPED | OUTPATIENT
Start: 2020-09-04 | End: 2020-11-23

## 2020-09-04 RX ADMIN — ONDANSETRON 8 MG: 8 TABLET, ORALLY DISINTEGRATING ORAL at 08:09

## 2020-09-04 RX ADMIN — PANTOPRAZOLE SODIUM 40 MG: 40 TABLET, DELAYED RELEASE ORAL at 09:09

## 2020-09-04 RX ADMIN — INFLUENZA VIRUS VACCINE 0.5 ML: 15; 15; 15; 15 SUSPENSION INTRAMUSCULAR at 03:09

## 2020-09-04 RX ADMIN — SACUBITRIL AND VALSARTAN 1 TABLET: 97; 103 TABLET, FILM COATED ORAL at 09:09

## 2020-09-04 RX ADMIN — ISOSORBIDE DINITRATE 20 MG: 10 TABLET ORAL at 09:09

## 2020-09-04 RX ADMIN — ATORVASTATIN CALCIUM 40 MG: 40 TABLET, FILM COATED ORAL at 09:09

## 2020-09-04 RX ADMIN — ISOSORBIDE DINITRATE 20 MG: 10 TABLET ORAL at 02:09

## 2020-09-04 RX ADMIN — HYDRALAZINE HYDROCHLORIDE 25 MG: 25 TABLET, FILM COATED ORAL at 05:09

## 2020-09-04 RX ADMIN — METOPROLOL SUCCINATE 25 MG: 25 TABLET, EXTENDED RELEASE ORAL at 09:09

## 2020-09-04 RX ADMIN — INSULIN ASPART 4 UNITS: 100 INJECTION, SOLUTION INTRAVENOUS; SUBCUTANEOUS at 11:09

## 2020-09-04 RX ADMIN — HYDRALAZINE HYDROCHLORIDE 25 MG: 25 TABLET, FILM COATED ORAL at 02:09

## 2020-09-04 RX ADMIN — FLUTICASONE FUROATE AND VILANTEROL TRIFENATATE 1 PUFF: 100; 25 POWDER RESPIRATORY (INHALATION) at 08:09

## 2020-09-04 NOTE — DISCHARGE SUMMARY
[facility] Hospital Medicine  Discharge Summary      Patient Name: Hafsa Hawley  MRN: [id number]  Admission Date: 9/3/2020  Hospital Length of Stay: 1 days  Discharge Date and Time:  09/04/2020 3:18 PM  Attending Physician: Burke Shah MD   Discharging Provider: Mikayla Hector MD  Primary Care Provider: Jamilah Burch MD      HPI:   Ms. Hawley is a 54 y.o. female with DM, CKDIII, SVT (RFA 5/2019), NICM, CHF (last EF 35-40% 9/2019) has ICD, HTN, MELISSA, paroxysmal atrial fibrillation (on eliquis), Fibromyalgia presents to the emergency department with weakness, fatigue, shortness of breath.  Patient was recently diagnosed with COVID on 08/12/2020 [facility] was admitted for 4 days and discharged to self quarantine.  Since COVID diagnosis patient states that she has continued to have fatigue and shortness of breath with additional complaint nonspecific and atypical epigastric discomfort and sternal pressure.  She reports that the symptoms have progressively worsened over the last couple days and has also noticed arise in her blood pressure.  This prompted patient to come to the emergency department.  She denies any fever, chills, cough, nausea vomiting.  Patient has longstanding cardiac history and has been followed by cardiologist who she recently saw on 08/26/2020.     In the ED vital signs are stable.  Troponin found to be 0.523.  .  CMP showing CKD 3.  CBC showing microcytic anemia.  EKG showing sinus tachycardia with left axis deviation and left ventricular hypertrophy no STEMI.  Patient was given aspirin in the ED and started on heparin drip for NSTEMI.     During rounds, patient endorsed extra socioeconomic stressors she has been dealing with recently. Patient was very tearful reporting she worries a lot about her health and how to pay for expenses while taking care of her 17 yo granddaughter. Pt reports lack of sleep and feelings of guilt.     * No  surgery found *      Hospital Course:   Per EMR and EKG, patient has ongoing issues of SVT. She is followed closely with a cardiologist who is aware of these issues. Per Cards, elevated trops is at her baseline and EKG showed no changes. Heparin drip stopped. Cards signed off.   Pt reports feeling better. With the stressors and insomnia, started patient on trazadone. Repeat troponin on 9/4 trending down.     9/5 pt reports minimal sleep due to stressors. She tolerated PO diet and is feeling better. EKG unchanged from admission. Troponin .0.4, lower than her baseline.      Consults:   Consults (From admission, onward)        Status Ordering Provider     Case Management  Once     Provider:  (Not yet assigned)    Acknowledged MERLENE MILLS     Inpatient consult to Cardiology-[facility]  Once     Provider:  (Not yet assigned)    Completed MERLENE MILLS     Inpatient consult to Spiritual Care  Once     Provider:  (Not yet assigned)    Acknowledged ROBY GARCES          No new Assessment & Plan notes have been filed under this hospital service since the last note was generated.  Service: Hospital Medicine    Final Active Diagnoses:    Diagnosis Date Noted POA    Type 2 diabetes mellitus with hyperglycemia, without long-term current use of insulin [E11.65] 08/13/2020 Yes    ICD (implantable cardioverter-defibrillator) in place [Z95.810] 07/24/2018 Yes     Chronic    Chronic kidney disease, stage III (moderate) [N18.3] 05/08/2018 Yes     Chronic    Chronic combined systolic and diastolic heart failure [I50.42] 03/16/2018 Yes    COPD (chronic obstructive pulmonary disease) [J44.9] 12/19/2017 Yes     Chronic    NICM (nonischemic cardiomyopathy) [I42.8] 10/27/2016 Yes     Chronic    Paroxysmal atrial fibrillation [I48.0] 08/25/2016 Yes     Chronic    MELISSA (obstructive sleep apnea) [G47.33] 08/23/2016 Yes     Chronic    Essential hypertension [I10] 07/22/2016 Yes     Chronic    Microcytic anemia [D50.9] 07/20/2016 Yes      Chronic      Problems Resolved During this Admission:    Diagnosis Date Noted Date Resolved POA    PRINCIPAL PROBLEM:  NSTEMI (non-ST elevated myocardial infarction) [I21.4] 09/03/2020 09/04/2020 Yes    COVID-19 virus infection [U07.1] 08/12/2020 09/04/2020 Yes    Elevated troponin [R79.89] 07/20/2016 09/04/2020 Yes       Discharged Condition: stable    Disposition: Home or Self Care    Follow Up:  Follow-up Information     MD Arnol. Schedule an appointment as soon as possible for a visit today.    Specialty: Family Medicine  Why: hospital follow up, FOLLOW UP WITH PCP AFTER [facility] APPT  Contact information:  [address] [place] [address]  [phone]             Lourdes F Alberto, NP. Go on 9/11/2020.    Specialty: Cardiology  Why: at 8 am; FOLLOW UP WITH CARDIOLOGIST AFTER DISCHARGE  Contact information:  [address]  [facility] [address]  [phone]             [facility]. Go on 9/17/2020.    Why:  at 3pm; [facility] AFTER RECENT DISCHARGE  Contact information:  [address]; [address]  [place] [address]  [phone]           Schedule an appointment as soon as possible for a visit with [facility].    Why: REHAB, FOLLOW UP WITH THERAPY; OFFICE TO CALL PATIENT  Contact information:  [phone]  Call to set up therapy visits. Office will also attempt to contact you up to 3 times.           Call [facility].    Why: As needed, TRANSPORTATION TO APPOINTMENTS AS NEEDED  Contact information:  Call as need for transporation to appts  [phone]           Call [employer].    Why: As needed, Call for meals  Contact information:   [phone]               Patient Instructions:      Ambulatory referral/consult to Physical/Occupational Therapy   Standing Status: Future   Referral Priority: Routine Referral Type: Physical Medicine   Referral Reason: Specialty Services Required   Number of  Visits Requested: 1     Ambulatory referral/consult to Speech Therapy   Standing Status: Future   Referral Priority: Routine Referral Type: Speech Therapy   Referral Reason: Specialty Services Required   Requested Specialty: Speech Pathology   Number of Visits Requested: 1     Diet diabetic       Significant Diagnostic Studies: Labs:   CMP   Recent Labs   Lab 09/03/20  0102 09/04/20  0516    140   K 4.1 4.4    108   CO2 21* 21*   * 170*   BUN 37* 36*   CREATININE 1.7* 1.6*   CALCIUM 8.4* 8.4*   PROT 6.2 5.7*   ALBUMIN 3.1* 2.7*   BILITOT 0.9 0.9   ALKPHOS 64 56   AST 27 20   ALT 21 17   ANIONGAP 13 11   ESTGFRAFRICA 39* 42*   EGFRNONAA 33* 36*   , CBC   Recent Labs   Lab 09/03/20  0102 09/04/20  0516   WBC 6.10 4.62   HGB 8.8* 8.0*   HCT 29.8* 27.6*   * 111*   , Troponin   Recent Labs   Lab 09/03/20  1219 09/03/20  1945 09/04/20  1140   TROPONINI 0.525* 0.501* 0.420*    and A1C:   Recent Labs   Lab 08/13/20  0828 09/03/20  0535   HGBA1C 7.0* 7.8*     Cardiac Graphics: ECG: sinus tachycardia with left axis deviation and left ventricular hypertrophy no STEMI    Pending Diagnostic Studies:     None         Medications:  Reconciled Home Medications:      Medication List      START taking these medications    isosorbide dinitrate 20 MG tablet  Commonly known as: ISORDIL  Take 1 tablet (20 mg total) by mouth 3 (three) times daily.     ondansetron 8 MG Tbdl  Commonly known as: ZOFRAN-ODT  Dissolve 1 tablet (8 mg total) by mouth every 6 (six) hours as needed.     traZODone 50 MG tablet  Commonly known as: DESYREL  Take 1 tablet (50 mg total) by mouth every evening.        CONTINUE taking these medications    albuterol-ipratropium 2.5 mg-0.5 mg/3 mL nebulizer solution  Commonly known as: DUO-NEB  Take 1 mL by nebulization every 6 (six) hours as needed for Wheezing or Shortness of Breath.     apixaban 5 mg Tab  Commonly known as: ELIQUIS  Take 1 tablet (5 mg total) by mouth 2 (two) times daily.      atorvastatin 40 MG tablet  Commonly known as: LIPITOR  Take 1 tablet (40 mg total) by mouth once daily.     b complex vitamins tablet  Take 1 tablet by mouth once daily.     benzonatate 100 MG capsule  Commonly known as: TESSALON  Take 1 capsule (100 mg total) by mouth 3 (three) times daily as needed for Cough.     blood sugar diagnostic Strp  3 times daily     blood-glucose meter kit  Use as instructed     budesonide-formoterol 80-4.5 mcg 80-4.5 mcg/actuation Hfaa  Commonly known as: SYMBICORT  Inhale 2 puffs twice a day by inhalation route.     cyanocobalamin (vitamin B-12) 50 mcg tablet  Take 5 mcg by mouth once daily.     diclofenac sodium 1 % Gel  Commonly known as: VOLTAREN  Apply 2 g topically 3 (three) times daily.     ENTRESTO  mg per tablet  Generic drug: sacubitriL-valsartan  Take 1 tablet by mouth 2 (two) times daily.     ergocalciferol 50,000 unit Cap  Commonly known as: ERGOCALCIFEROL  ergocalciferol (vitamin D2) 50,000 unit capsule   Take 1 capsule every week by oral route.     fluticasone propionate 50 mcg/actuation nasal spray  Commonly known as: FLONASE  Spray 2 sprays every day by intranasal route.     fluticasone-salmeterol 100-50 mcg/dose 100-50 mcg/dose diskus inhaler  Commonly known as: ADVAIR  Inhale 1 puff into the lungs 2 (two) times daily. Controller     furosemide 80 MG tablet  Commonly known as: LASIX  TAKE 1 TAB TWICE A DAY TAKE EXTRA DOSE FOR GAIN OF 2 OR MORE POUNDS IN 1 DAY OR 5 POUNDS IN 1 WEEK     glimepiride 1 MG tablet  Commonly known as: AMARYL  Take 1 tablet (1 mg total) by mouth before breakfast.     lidocaine 5 %  Commonly known as: LIDODERM  Place 1 patch onto the skin once daily. Keep patch on for 12 hours then remove. One patch daily (12 hours on/12 hours off).     metFORMIN 500 MG tablet  Commonly known as: GLUCOPHAGE  Take 1 tablet (500 mg total) by mouth daily with breakfast for 14 days, THEN 1 tablet (500 mg total) 2 (two) times daily with meals for 14  days.  Start taking on: August 20, 2020     metoprolol succinate 25 MG 24 hr tablet  Commonly known as: TOPROL-XL  Take 1 tablet (25 mg total) by mouth 2 (two) times daily.     montelukast 10 mg tablet  Commonly known as: SINGULAIR  Take 1 tablet every day by oral route for 30 days.     NITROSTAT 0.4 MG SL tablet  Generic drug: nitroGLYCERIN  Take 2.5 tablets (1 mg total) by mouth every 5 (five) minutes as needed for Chest pain. No more than 3 tablets in 15 minutes.     pantoprazole 40 MG tablet  Commonly known as: PROTONIX  Take 1 tablet by mouth once daily.     potassium chloride 10 MEQ Tbsr  Commonly known as: KLOR-CON  Take 1 tablet (10 mEq total) by mouth once daily.     rOPINIRole 6 mg Tb24  Take 0.5 mg by mouth once daily.     SILVADENE 1 % cream  Generic drug: silver sulfADIAZINE 1%  Silvadene 1 % topical cream   APPLY A 1/16 INCH (1.5 MM) THICK LAYER TO ENTIRE BURN AREA BY TOPICALROUTE 2 TIMES PER DAY for 10 days     VENTOLIN HFA 90 mcg/actuation inhaler  Generic drug: albuterol  Inhale 2 puffs into the lungs 2 (two) times daily as needed.     walker Misc  1 Device by Misc.(Non-Drug; Combo Route) route as needed.        STOP taking these medications    ALPRAZolam 1 MG tablet  Commonly known as: XANAX            Indwelling Lines/Drains at time of discharge:   Lines/Drains/Airways     None                 Time spent on the discharge of patient: 34 minutes  Patient was seen and examined on the date of discharge and determined to be suitable for discharge.         Mikayla Hector MD  Department of Hospital Medicine  [facility]

## 2020-09-04 NOTE — NURSING
Patient complained of sharp pain in chest area,Pain rated at about 10/10. Intermittent that lasts about 5-10 seconds. Patient stated she spoke with physician about the pain. Patient stated the pain has been off and on for 3 days. Dr Adrian Aj notified. No new orders. Will continue to monitor patient.

## 2020-09-04 NOTE — PLAN OF CARE
Plan of care reviewed with patient. Normal sinus rhythm on continuous heart monitor, no true red alarms.Blood glucose maintained per MD orders. Safety maintained at this time.Patient independent.  Bed in lowest position, side rails up x 2. Call light in reach.  Encouraged patient to use call light for assistance .Verbalized understanding. Will continue to monitor patient.

## 2020-09-04 NOTE — CHAPLAIN
I was consulted to provide advance directive assistance.  She is being discharged and will complete what she can independently.  She knows she can call me or come back to the hospital on Wednesday for more assistance and or counseling.  She spoke of having excessive anxiety related to Covid and its effects on her and her family.  She will speak with her doctor about help for anxiety and depression.  I provided a lot of listening support and encouraged her to use her spiritual resources as well. She may contact me by phone in a few days.

## 2020-09-04 NOTE — NURSING
Patient safety maintained. Medications administered per order. Instructed to call for assistance as needed. Printed documentation provided for discharge. Medications delivered to bedside by pharmacy. IV and telemetry removed, patient tolerated well.

## 2020-09-04 NOTE — TELEPHONE ENCOUNTER
----- Message from Patience Farnsworth sent at 9/4/2020  2:40 PM CDT -----  Regarding: Appt/virtual  Hospital calling to schedule a one week FU with blanca Dominguez can call Pt. Kayode

## 2020-09-04 NOTE — PLAN OF CARE
VN reviewed discharge instructions with pt.  AVS printed and handed to pt by bedside nurse.  Reviewed follow-up appointments, medications, diet, and importance of medication compliance.  Reviewed home care instructions, treatment plan, self-management, and when to seek medical attention.  Allowed time for questions.  All questions answered.  Patient verbalized complete understanding of discharge instructions and voices no concerns.     Discharge instructions complete.  Bedside delivery done.  Pt waiting on ride home.   Bedside nurse notified.

## 2020-09-04 NOTE — PT/OT/SLP EVAL
Occupational Therapy   Evaluation and Discharge Note    Name: Hafsa Hawley  MRN: [id number]  Admitting Diagnosis:  NSTEMI (non-ST elevated myocardial infarction)      Recommendations:     Discharge Recommendations: outpatient PT  Discharge Equipment Recommendations:  none  Barriers to discharge:  None    Assessment:     Hafsa Hawley is a 55 y.o. female with a medical diagnosis of NSTEMI (non-ST elevated myocardial infarction). At this time, patient is functioning at their prior level of function and does not require further acute OT services.     Plan:     During this hospitalization, patient does not require further acute OT services.  Please re-consult if situation changes.    · Plan of Care Reviewed with: patient    Subjective     Chief Complaint: decreased endurance  Patient/Family Comments/goals: return to OF    Occupational Profile:  Living Environment: Lives w/ 13 y/o grddtr [place] [facility] no [place]  Previous level of function: mod I  Roles and Routines:   Equipment Used at home:  shower chair, walker, rolling  Assistance upon Discharge: family    Pain/Comfort:  · Pain Rating 1: 0/10  · Pain Rating Post-Intervention 1: 0/10    Patients cultural, spiritual, Shinto conflicts given the current situation: no    Objective:     Communicated with: nurse prior to session.  Patient found HOB elevated with peripheral IV upon OT entry to room.    General Precautions: Standard, fall   Orthopedic Precautions:    Braces:       Occupational Performance:    Bed Mobility:    · Mod I    Functional Mobility/Transfers:  · Mod I  · Functional Mobility: mod I in room no AD    Activities of Daily Living:  · Mod I    Cognitive/Visual Perceptual:  AO4    Physical Exam:  BUE AROM/strength WFL  Normal sit balance, good stand balance  Fair activity tolerance    AMPAC 6 Click ADL:  AMPAC Total Score: 24    Treatment & Education:  Pt educated on role of OT/pOC, energy conservation/work simplification, post acute  recs.  Education:    Patient left seated EOB with all lines intact, call button in reach and nurse notified    GOALS:   Multidisciplinary Problems     Occupational Therapy Goals        Problem: Occupational Therapy Goal    Goal Priority Disciplines Outcome Interventions   Occupational Therapy Goal     OT, PT/OT Ongoing, Progressing    Description:                      History:     Past Medical History:   Diagnosis Date    Anemia     Anticoagulant long-term use     Arthritis     Atrial fibrillation     Congestive heart failure     COPD (chronic obstructive pulmonary disease)     Deep vein thrombosis     elevated bilirubin d/t Gilbert's syndrome     confirmed by [facility] genetic testing, evaluated by hepatology    Encounter for blood transfusion     GERD (gastroesophageal reflux disease)     Hypertension     Pheochromocytoma, malignant     Right kidney mass     Sleep apnea     Thyroid disease     Type 2 diabetes mellitus with hyperglycemia, without long-term current use of insulin 8/13/2020       Past Surgical History:   Procedure Laterality Date    APPENDECTOMY      CARDIAC DEFIBRILLATOR PLACEMENT Left 12/2016    EYE SURGERY      due to running tears    FRACTURE SURGERY Left     hand 5th digit    HYSTERECTOMY      KNEE SURGERY Left 2016    hematoma    LIVER BIOPSY  10/24/2018    Minimal steatosis, predominantly macrovesicular, 1%, Minimal nonspecific portal inflammation, no fibrosis. No findings on biopsy to explain elevated bilirubin levels. Could be d/t Gilbert's =?- hemolysis    TRANSJUGULAR BIOPSY OF LIVER N/A 10/24/2018    Procedure: BIOPSY, LIVER, TRANSJUGULAR APPROACH;  Surgeon: Carmen Diagnostic Provider;  Location: [facility] OR [address] [place] [address];  Service: Radiology;  Laterality: N/A;       Time Tracking:     OT Date of Treatment: 09/04/20  OT Start Time: 1340  OT Stop Time: 1407  OT Total Time (min): 27 min    Billable Minutes:Evaluation 15  Self Care/Home Management 12    Iban Mtz OT  9/4/2020

## 2020-09-04 NOTE — PLAN OF CARE
Problem: Physical Therapy Goal  Goal: Physical Therapy Goal  Description: Goals to be met by: 10/3/20    Patient will increase functional independence with mobility by performin.  Ambulate 250' with RW with supervision  2.  Sit in chair 2 hours  3.  Bed to/from chair with supervision  4.  Ind seated HEP    Outcome: Ongoing, Progressing

## 2020-09-04 NOTE — PLAN OF CARE
TN updated DCA with patient at bedside. Pt reports she lives in an apartment with her 13 yo granddaughter. She does not have transportation and will need to be set up with Medicaid.  She sees Dr. Pretty and has an arrthymia monitor that she uses regularly and if HR is elevated they told her to come into the hospital.  Pt cares for herself at home and has all DM supplies she needs to monitor and administer med as needed.    Future Appointments   Date Time Provider Department Center   9/28/2020  2:00 PM GIOVANNY Rodríguez [facility] [place]   11/7/2020 10:00 AM HOME MONITOR DEVICE CHECK, [facility] RAYMOND KIMBERLYNRO [facility]   12/9/2020 10:15 AM EKG, APPT [facility] EKG [facility]        09/04/20 8606   Discharge Assessment   Assessment Type Discharge Planning Assessment   Confirmed/corrected address and phone number on facesheet? Yes   Assessment information obtained from? Patient   Communicated expected length of stay with patient/caregiver yes   Prior to hospitilization cognitive status: Alert/Oriented;No Deficits   Prior to hospitalization functional status: Independent   Current cognitive status: Alert/Oriented;No Deficits   Current Functional Status: Independent   Lives With child(ricky), dependent   Able to Return to Prior Arrangements yes   Is patient able to care for self after discharge? Yes   Patient's perception of discharge disposition home or selfcare   Patient currently being followed by outpatient case management? No   Patient currently receives any other outside agency services? No   Equipment Currently Used at Home nebulizer;CPAP;walker, rolling;shower chair  (blood pressure cuff)   Do you have any problems affording any of your prescribed medications? No   Is the patient taking medications as prescribed? yes   Does the patient have transportation home? Yes   Transportation Anticipated family or friend will provide   Does the patient receive services at the [facility]? No   Discharge Plan A Home   DME Needed Upon  Discharge  none   Patient/Family in Agreement with Plan yes     Sophia Shirley RN  RN Transition Navigator  [phone]

## 2020-09-04 NOTE — PLAN OF CARE
The proper method of use, as well as anticipated side effects, of this metered-dose inhaler are discussed and demonstrated to the patient. Pt on RA with documented sats. Will continue to monitor.

## 2020-09-04 NOTE — CARE UPDATE
Reviewed chart and discussed plan of care with inpatient primary team. Patient likely fluid overloaded causing worsening shortness of breath on top of COPD and recovering from COVID infection. Appreciate cardiology consult. Patient also likely with some degree of depression 2/2 poor health of late and recent death in the family. Will plan to see patient closely in clinic following discharge.       Edward Damon M.D.  PGY-II  [facility]

## 2020-09-04 NOTE — SUBJECTIVE & OBJECTIVE
Interval History: She experienced an isolated episode of stabbing chest pain overnight that aroused her from sleep, but has since resolved. She notes that she was unable to sleep throughout the night despite being given Trazadone 50mg. She endorse that she has been extremely anxious regarding financial stresses, and her current health. She experienced nausea overnight and this morning and was given Zofran will mild relieve. She was able to tolerate breakfast and lunch without vomiting. Remains afebrile.     Review of Systems   Constitutional: Negative.    Eyes: Negative.    Respiratory: Negative for shortness of breath.    Cardiovascular: Negative for chest pain.   Gastrointestinal: Positive for nausea.   Endocrine: Negative.    Genitourinary: Negative.    Musculoskeletal: Negative.    Skin: Negative.    Allergic/Immunologic: Negative.    Neurological: Negative.    Hematological: Negative.    Psychiatric/Behavioral: Negative for agitation. The patient is nervous/anxious.      Objective:     Vital Signs (Most Recent):  Temp: 96.4 °F (35.8 °C) (09/04/20 1125)  Pulse: 94 (09/04/20 1140)  Resp: 17 (09/04/20 1125)  BP: 117/63 (09/04/20 1125)  SpO2: 98 % (09/04/20 1125) Vital Signs (24h Range):  Temp:  [96 °F (35.6 °C)-98 °F (36.7 °C)] 96.4 °F (35.8 °C)  Pulse:  [] 94  Resp:  [16-22] 17  SpO2:  [94 %-98 %] 98 %  BP: (107-149)/(51-90) 117/63     Weight: 88 kg (194 lb 0.1 oz)  Body mass index is 33.3 kg/m².  No intake or output data in the 24 hours ending 09/04/20 1335   Physical Exam  Vitals signs and nursing note reviewed.   Constitutional:       Appearance: Normal appearance. She is obese. She is not toxic-appearing.   HENT:      Head: Normocephalic and atraumatic.   Eyes:      General: No scleral icterus.  Cardiovascular:      Rate and Rhythm: Normal rate.      Pulses: Normal pulses.   Pulmonary:      Breath sounds: Normal breath sounds.   Abdominal:      General: Bowel sounds are normal. There is no distension.       Tenderness: There is no right CVA tenderness or left CVA tenderness.   Musculoskeletal:         General: No swelling.   Skin:     Capillary Refill: Capillary refill takes less than 2 seconds.   Neurological:      Mental Status: She is alert and oriented to person, place, and time.   Psychiatric:         Mood and Affect: Mood is anxious. Affect is tearful.         Thought Content: Thought content normal.         Significant Labs:   A1C:   Recent Labs   Lab 08/13/20  0828 09/03/20  0535   HGBA1C 7.0* 7.8*     CBC:   Recent Labs   Lab 09/03/20  0102 09/04/20  0516   WBC 6.10 4.62   HGB 8.8* 8.0*   HCT 29.8* 27.6*   * 111*     CMP:   Recent Labs   Lab 09/03/20  0102 09/04/20  0516    140   K 4.1 4.4    108   CO2 21* 21*   * 170*   BUN 37* 36*   CREATININE 1.7* 1.6*   CALCIUM 8.4* 8.4*   PROT 6.2 5.7*   ALBUMIN 3.1* 2.7*   BILITOT 0.9 0.9   ALKPHOS 64 56   AST 27 20   ALT 21 17   ANIONGAP 13 11   EGFRNONAA 33* 36*     Cardiac Markers:   Recent Labs   Lab 09/03/20  0102   *     Magnesium:   Recent Labs   Lab 09/03/20  0437 09/04/20  0516   MG 1.3*  1.3* 1.8     Troponin:   Recent Labs   Lab 09/03/20  1219 09/03/20  1945 09/04/20  1140   TROPONINI 0.525* 0.501* 0.420*        Significant Imaging: I have reviewed all pertinent imaging results/findings within the past 24 hours.

## 2020-09-04 NOTE — PLAN OF CARE
Discharge rounds on patient via telephone after patient contacted TN. Discussed followup appointments, blue discharge folder, discharge nurse will go over home medications and reasons for medications and final discharge instructions. All patient/caregiver questions answered. Patient verbalized understanding.    Future Appointments   Date Time Provider Department Center   9/17/2020  3:00 PM Salima Cote MD [facility] [place] [facility]   9/28/2020  2:00 PM Titus Rushing RD [facility] DIAMGMT [place]   11/7/2020 10:00 AM HOME MONITOR DEVICE CHECK, [facility]   12/9/2020 10:15 AM EKG, APPT [facility]     TN spoke with  at SABINE Dominguez NP office. They will set up appt for F/U in 1 wk. TN update pt to call if they do not reach out to her by Tuesday.  Jamilah Burch MD  Schedule an appointment as soon as possible for a visit today  hospital follow up, FOLLOW UP WITH PCP AFTER [facility] APPT  [address] [place] [address]  [phone]   Lourdes Dominguez NP  Go on 9/11/2020  at 8 am; FOLLOW UP WITH CARDIOLOGIST AFTER DISCHARGE  [address] [facility] [address]  [phone]   [facility]  Go on 9/17/2020  at 3pm; [facility] AFTER RECENT DISCHARGE  [address]; [address]  [place] [address]  [phone]   [facility]  Schedule an appointment as soon as possible for a visit  REHAB, FOLLOW UP WITH THERAPY; OFFICE TO CALL PATIENT  [phone]  Call to set up therapy visits. Office will also attempt to contact you up to 3 times.   [facility] [facility] Medical [facility]  Call  As needed, TRANSPORTATION TO APPOINTMENTS AS NEEDED  Call as need for transporation to appts  [phone]   [employer]  Call  As needed, Call for meals  [phone]              09/04/20 1434   Final Note   Assessment Type Final Discharge Note   Anticipated Discharge Disposition Home   Hospital Follow Up  Appt(s)  scheduled? Yes   Discharge plans and expectations educations in teach back method with documentation complete? Yes   Right Care Referral Info   Post Acute Recommendation No Care   Post-Acute Status   Discharge Delays (!) Personal Transportation  (TN called [employer] for tranportation to . [employer]# [id number])     Sophia Shirley RN  RN Transition Navigator  [phone]

## 2020-09-04 NOTE — PT/OT/SLP EVAL
Physical Therapy Evaluation    Patient Name:  Hafsa Hawley   MRN:  [id number]    Recommendations:     Discharge Recommendations:  home   Discharge Equipment Recommendations: none   Barriers to discharge: None    Assessment:     Hafsa Hawley is a 55 y.o. female admitted with a medical diagnosis of NSTEMI (non-ST elevated myocardial infarction).  She presents with the following impairments/functional limitations:  weakness, impaired functional mobilty, impaired cardiopulmonary response to activity, impaired endurance, gait instability, impaired balance, impaired self care skills, decreased lower extremity function Pt supine to sit with supervision.  Pt ambulated 125' with RW with supervision with slight wavering/side to side initially primarily. Pt sit to supine with supervision.    Rehab Prognosis: Good; patient would benefit from acute skilled PT services to address these deficits and reach maximum level of function.    Recent Surgery: * No surgery found *      Plan:     During this hospitalization, patient to be seen 6 x/week to address the identified rehab impairments via gait training, therapeutic activities, therapeutic exercises, neuromuscular re-education and progress toward the following goals:    · Plan of Care Expires:  10/03/20    Subjective     Chief Complaint: a little dizzy upon sitting up  Patient/Family Comments/goals: go home  Pain/Comfort:  · Pain Rating 1: 0/10    Patients cultural, spiritual, Mu-ism conflicts given the current situation: no    Living Environment:  Pt lives with her granddaughter in a H with no steps.   Prior to admission, patients level of function was Ind amb with Rollator for longer distances.  Equipment used at home: (Rollator at times, SC).  DME owned (not currently used): none.  Upon discharge, patient will have assistance from family.    Objective:     Communicated with nurse prior to session.  Patient found right sidelying with peripheral IV  upon PT entry to  room.    General Precautions: Standard,     Orthopedic Precautions:    Braces:       Exams:  · Pt is oriented x 4.  Pt has BLE PROM WFL noting decreased knee flexion bilaterally.  Pt has 3+ to 4+/5 BLE strength with significant hip weakness primarily.    Functional Mobility:  Pt supine to sit with supervision.  Pt ambulated 125' with RW with supervision with slight wavering/side to side initially primarily. Pt sit to supine with supervision. Pt scooted to HOB with supervision.    Therapeutic Activities and Exercises:   Pt performed seated BLE AROM LAQ, hip flexion, hip abd, AP and flutter kicks x 10.     AM-PAC 6 CLICK MOBILITY  Total Score:21     Patient left HOB elevated with all lines intact, call button in reach, bed alarm on and nurse notified.    GOALS:   Multidisciplinary Problems     Physical Therapy Goals        Problem: Physical Therapy Goal    Goal Priority Disciplines Outcome Goal Variances Interventions   Physical Therapy Goal     PT, PT/OT Ongoing, Progressing     Description: Goals to be met by: 10/3/20    Patient will increase functional independence with mobility by performin.  Ambulate 250' with RW with supervision  2.  Sit in chair 2 hours  3.  Bed to/from chair with supervision  4.  Ind seated HEP                     History:     Past Medical History:   Diagnosis Date    Anemia     Anticoagulant long-term use     Arthritis     Atrial fibrillation     Congestive heart failure     COPD (chronic obstructive pulmonary disease)     Deep vein thrombosis     elevated bilirubin d/t Gilbert's syndrome     confirmed by [place] genetic testing, evaluated by hepatology    Encounter for blood transfusion     GERD (gastroesophageal reflux disease)     Hypertension     Pheochromocytoma, malignant     Right kidney mass     Sleep apnea     Thyroid disease     Type 2 diabetes mellitus with hyperglycemia, without long-term current use of insulin 2020       Past Surgical History:   Procedure  Laterality Date    APPENDECTOMY      CARDIAC DEFIBRILLATOR PLACEMENT Left 12/2016    EYE SURGERY      due to running tears    FRACTURE SURGERY Left     hand 5th digit    HYSTERECTOMY      KNEE SURGERY Left 2016    hematoma    LIVER BIOPSY  10/24/2018    Minimal steatosis, predominantly macrovesicular, 1%, Minimal nonspecific portal inflammation, no fibrosis. No findings on biopsy to explain elevated bilirubin levels. Could be d/t Gilbert's =?- hemolysis    TRANSJUGULAR BIOPSY OF LIVER N/A 10/24/2018    Procedure: BIOPSY, LIVER, TRANSJUGULAR APPROACH;  Surgeon: Carmen Diagnostic Provider;  Location: [facility] OR [address] [place] [address];  Service: Radiology;  Laterality: N/A;       Time Tracking:     PT Received On: 09/04/20  PT Start Time: 1227     PT Stop Time: 1253  PT Total Time (min): 26 min     Billable Minutes: Evaluation 14 and Gait Training 12      Temitope Hoyt, PT  09/04/2020

## 2020-09-07 ENCOUNTER — PATIENT OUTREACH (OUTPATIENT)
Dept: ADMINISTRATIVE | Facility: CLINIC | Age: 55
End: 2020-09-07

## 2020-09-07 NOTE — PATIENT INSTRUCTIONS

## 2020-09-08 NOTE — PROGRESS NOTES
Ms. Hawley is a patient of Dr. Pretty and was last seen in clinic 8/26/2020.      Subjective:   Patient ID:  Hafsa Hawley is a 55 y.o. female who presents for follow-up of Tachycardia  .     HPI:    Ms. Hawley is a 55 y.o. female with SVT (RFA 5/2019), NICM, CHF, HTN, Sleep Apnea, paroxysmal atrial fibrillation (on eliquis), Fibromyalgia, ICD, alpha thalassemia here for hospital follow up.     Background:    Primary cardiologist is Dr. Cortez.  Longstanding history of non-ischemic cardiomyopathy.  [facility] 11/15/15 EF 30-35% with normal coronary arteries.  Has been on optimal medical therapy.  Admitted to [facility] 9/16 with acute decompensated Heart failure. Diuresed 12 liters.  Echo 8/24/16 EF 20%.  Repeat echo 10/17/16 EF 30-35% with small pericardial effusion.  ICD implanted 12/2/16 (VDD single pass lead).    8/20/2018: Went to ED with chest tightness and palpitations. HR in 150s and found to be in SVT. Given diltiazem and spontaneously converted back to sinus rhythm.   8/30/2018: CHF hospitalization. No arrhythmia during this admission.  10/21/2018: ED with CP. No arrhythmia during this admission. Device check 10/29/2019 showed SVTx2, max 52 seconds during this period.  11/17/2018: ED with CP. SVT on EKG. Spontaneous conversion.  1/27/2019: ED with chest pain and SOB. She was given Adnoesine 6mg x 1 with conversion to SR. Device check 1/30/2019 showed SVTx19, longest 10 minutes.  On EKG SVT c/w AVNRT. Ablation planned.    Underwent successful slow pathway modification on 5/23/2019. No palpitations reported at clinic visit 7/2019.   SVT/ST on device report 2/2020 - patient asymptomatic. On carvedilol 25mg BID. No RV pacing. No changes.    Patient hospitalized with covid-19 on 8/12/2020-8/16/2020. Was hospitalized her coreg was decreased due to bradycardia.   She was diagnosed with new-onset DM during hospitalization.    Clinic visit 8/26/2020 she reports that she continues to feel unwell. Has continued SOB.  Some palpitations. Weakness. In a wheechair today.   Device Interrogation (8/23/2020 - remote) reveals an intrinsic SR with stable lead and device function. No ventricular arrhythmias or treated episodes were noted. No AF. SVT/STx23 episodes. Last episode 8/22/2020 16 seconds c/w ST.  Brief ST/SVT seen on device. No AF or VT. Her coreg was decreased from 25mg BID to 3.125mg BID in hospital.   Stop carvedilol.  Start metoprolol succinate 25mg BID.    Update (09/11/2020):    9/2/2020: hospitalized with elevated troponin. ASA and heparin gtt started in EF. Ultimately it was determined that her elevated troponin mostly 2/2 uncontrolled HTN and decompensated heart failure and less likely 2/2 ACS    Today she continues to feel unwell. Feels palpitations, LAGUERRE, fatigue. Denies CP, syncope. She does have episodes of LH. Says her blood sugars are not regulated either.    She is currently taking eliquis 5mg BID for stroke prophylaxis and denies significant bleeding episodes. She is currently being treated with metoprolol succinate 25mg BID for HR control.  Kidney function is stable, with a creatinine of 1.6 on 9/4/2020.    No AF on device. Increased SVT/ST episodes. Last 9/10/2020  lasting 5 min. No ventricular events.     I have personally reviewed the patient's EKG today, which shows sinus tachycardia at 100bpm. MT interval is 176. QRS is 102. QTc is 513.    Recent Cardiac Tests:    2D Echo (9/17/2019):  · Mild-Moderately decreased left ventricular systolic function. The estimated ejection fraction is 35-40%  · Significantly thickened myocardium, moderate concentric left ventricular hypertrophy.  · Grade I (mild) left ventricular diastolic dysfunction consistent with impaired relaxation.  · Mild left ventricular enlargement.  · Moderate left atrial enlargement.  · Normal right ventricular systolic function.  · Mild mitral regurgitation.  · Trivial Pericardial effusion.  · Intermediate central venous pressure (8 mm  Hg).  · The estimated PA systolic pressure is 43 mm Hg    Current Outpatient Medications   Medication Sig    albuterol-ipratropium 2.5mg-0.5mg/3mL (DUO-NEB) 0.5 mg-3 mg(2.5 mg base)/3 mL nebulizer solution Take 1 mL by nebulization every 6 (six) hours as needed for Wheezing or Shortness of Breath.    apixaban (ELIQUIS) 5 mg Tab Take 1 tablet (5 mg total) by mouth 2 (two) times daily.    atorvastatin (LIPITOR) 40 MG tablet Take 1 tablet (40 mg total) by mouth once daily.    b complex vitamins tablet Take 1 tablet by mouth once daily.    blood sugar diagnostic Strp 3 times daily    blood-glucose meter kit Use as instructed    budesonide-formoterol 80-4.5 mcg (SYMBICORT) 80-4.5 mcg/actuation HFAA Inhale 2 puffs twice a day by inhalation route.    cyanocobalamin, vitamin B-12, 50 mcg tablet Take 5 mcg by mouth once daily.    diclofenac sodium (VOLTAREN) 1 % Gel Apply 2 g topically 3 (three) times daily.    ergocalciferol (ERGOCALCIFEROL) 50,000 unit Cap ergocalciferol (vitamin D2) 50,000 unit capsule   Take 1 capsule every week by oral route.    fluticasone propionate (FLONASE) 50 mcg/actuation nasal spray Spray 2 sprays every day by intranasal route.    fluticasone-salmeterol diskus inhaler 100-50 mcg Inhale 1 puff into the lungs 2 (two) times daily. Controller    furosemide (LASIX) 80 MG tablet TAKE 1 TAB TWICE A DAY TAKE EXTRA DOSE FOR GAIN OF 2 OR MORE POUNDS IN 1 DAY OR 5 POUNDS IN 1 WEEK    glimepiride (AMARYL) 1 MG tablet Take 1 tablet (1 mg total) by mouth before breakfast.    isosorbide dinitrate (ISORDIL) 20 MG tablet Take 1 tablet (20 mg total) by mouth 3 (three) times daily.    lidocaine (LIDODERM) 5 % Place 1 patch onto the skin once daily. Keep patch on for 12 hours then remove. One patch daily (12 hours on/12 hours off).    metFORMIN (GLUCOPHAGE) 500 MG tablet Take 1 tablet (500 mg total) by mouth daily with breakfast for 14 days, THEN 1 tablet (500 mg total) 2 (two) times daily with  meals for 14 days.    metoprolol succinate (TOPROL-XL) 25 MG 24 hr tablet Take 1 tablet (25 mg total) by mouth 2 (two) times daily.    montelukast (SINGULAIR) 10 mg tablet Take 1 tablet every day by oral route for 30 days.    NITROSTAT 0.4 mg SL tablet Take 2.5 tablets (1 mg total) by mouth every 5 (five) minutes as needed for Chest pain. No more than 3 tablets in 15 minutes.    ondansetron (ZOFRAN-ODT) 8 MG TbDL Dissolve 1 tablet (8 mg total) by mouth every 6 (six) hours as needed.    pantoprazole (PROTONIX) 40 MG tablet Take 1 tablet by mouth once daily.    potassium chloride (KLOR-CON) 10 MEQ TbSR Take 1 tablet (10 mEq total) by mouth once daily.    rOPINIRole 6 mg Tb24 Take 0.5 mg by mouth once daily.     sacubitril-valsartan (ENTRESTO)  mg per tablet Take 1 tablet by mouth 2 (two) times daily.    silver sulfADIAZINE 1% (SILVADENE) 1 % cream Silvadene 1 % topical cream   APPLY A 1/16 INCH (1.5 MM) THICK LAYER TO ENTIRE BURN AREA BY TOPICALROUTE 2 TIMES PER DAY for 10 days    traZODone (DESYREL) 50 MG tablet Take 1 tablet (50 mg total) by mouth every evening.    VENTOLIN HFA 90 mcg/actuation inhaler Inhale 2 puffs into the lungs 2 (two) times daily as needed.     walker Misc 1 Device by Misc.(Non-Drug; Combo Route) route as needed.     No current facility-administered medications for this visit.      Facility-Administered Medications Ordered in Other Visits   Medication    0.9%  NaCl infusion    vancomycin in dextrose 5 % 1 gram/250 mL IVPB 1,000 mg       Review of Systems   Constitution: Positive for malaise/fatigue.   Cardiovascular: Positive for dyspnea on exertion and palpitations. Negative for chest pain, irregular heartbeat and leg swelling.   Respiratory: Positive for shortness of breath.    Hematologic/Lymphatic: Negative for bleeding problem.   Skin: Negative for rash.   Musculoskeletal: Negative for myalgias.   Gastrointestinal: Negative for hematemesis, hematochezia and nausea.  "  Genitourinary: Negative for hematuria.   Neurological: Negative for light-headedness.   Psychiatric/Behavioral: Negative for altered mental status.   Allergic/Immunologic: Negative for persistent infections.     Objective:          /68   Pulse 100   Ht 5' 6" (1.676 m)   Wt 98.2 kg (216 lb 7.9 oz)   LMP  (LMP Unknown)   BMI 34.94 kg/m²     Physical Exam   Constitutional: She is oriented to person, place, and time. She appears well-developed and well-nourished.   HENT:   Head: Normocephalic.   Nose: Nose normal.   Eyes: Pupils are equal, round, and reactive to light.   Cardiovascular: Regular rhythm, S1 normal and S2 normal. Tachycardia present.   No murmur heard.  Pulses:       Radial pulses are 2+ on the right side and 2+ on the left side.   Pulmonary/Chest: Breath sounds normal. No respiratory distress.   Abdominal: Normal appearance.   Musculoskeletal: Normal range of motion.         General: No edema.   Neurological: She is alert and oriented to person, place, and time.   Skin: Skin is warm and dry. No erythema.   Psychiatric: She has a normal mood and affect. Her speech is normal and behavior is normal.   Nursing note and vitals reviewed.    Lab Results   Component Value Date     09/04/2020    K 4.4 09/04/2020    MG 1.8 09/04/2020    BUN 36 (H) 09/04/2020    CREATININE 1.6 (H) 09/04/2020    ALT 17 09/04/2020    AST 20 09/04/2020    HGB 8.0 (L) 09/04/2020    HCT 27.6 (L) 09/04/2020    TSH 1.332 09/03/2020    LDLCALC 108.6 09/03/2020     Recent Labs   Lab 05/16/19  0936 06/21/19  1136 09/03/20  0336 09/03/20  0913   INR 1.2 1.1 1.0 1.0       Assessment:     1. Paroxysmal supraventricular tachycardia    2. Paroxysmal atrial fibrillation    3. NICM (nonischemic cardiomyopathy)    4. Essential hypertension    5. ICD (implantable cardioverter-defibrillator) in place      Plan:     In summary, Ms. Hawley is a 55 y.o. female with SVT (RFA 5/2019), NICM, CHF, HTN, Sleep Apnea, paroxysmal atrial " fibrillation (on eliquis), Fibromyalgia, ICD, alpha thalassemia here for hospital follow up.   She continues to have ST and SVT episodes. Was recently seen in the hospital with elevated troponins thought secondary to acute decompensated HF. She was hospitalized with covid-19 last month and is still struggling with recovery. Will discuss this case with Dr. Pretty. Patient is s/p slow pathway modification 5/2019. Unsure if additional EP study would be useful at this point given that patient is still recovering from acute illness and has decompensated CHF. Will increase beta blocker and make sure she sees HTS for fluid management.  She remains on eliquis. H&H are chronically low due to thalassemia.     Increase metoprolol succinate to 50mg BID.  Follow up appt with HTS as soon as possible.  RTC 1 mo, sooner if needed.    *A copy of this note has been sent to Dr. Pretty*    Follow up in about 4 weeks (around 10/9/2020).    ------------------------------------------------------------------    LYNN Fields, NP-C  Cardiac Electrophysiology

## 2020-09-11 ENCOUNTER — OFFICE VISIT (OUTPATIENT)
Dept: ELECTROPHYSIOLOGY | Facility: CLINIC | Age: 55
End: 2020-09-11
Payer: MEDICAID

## 2020-09-11 ENCOUNTER — HOSPITAL ENCOUNTER (OUTPATIENT)
Dept: CARDIOLOGY | Facility: CLINIC | Age: 55
Discharge: HOME | End: 2020-09-11
Payer: MEDICAID

## 2020-09-11 VITALS
WEIGHT: 216.5 LBS | HEIGHT: 66 IN | HEART RATE: 100 BPM | SYSTOLIC BLOOD PRESSURE: 118 MMHG | BODY MASS INDEX: 34.79 KG/M2 | DIASTOLIC BLOOD PRESSURE: 68 MMHG

## 2020-09-11 DIAGNOSIS — Z95.810 ICD (IMPLANTABLE CARDIOVERTER-DEFIBRILLATOR) IN PLACE: Chronic | ICD-10-CM

## 2020-09-11 DIAGNOSIS — I10 ESSENTIAL HYPERTENSION: Chronic | ICD-10-CM

## 2020-09-11 DIAGNOSIS — I47.10 PAROXYSMAL SUPRAVENTRICULAR TACHYCARDIA: Primary | Chronic | ICD-10-CM

## 2020-09-11 DIAGNOSIS — I48.0 PAROXYSMAL ATRIAL FIBRILLATION: Chronic | ICD-10-CM

## 2020-09-11 DIAGNOSIS — I42.8 NICM (NONISCHEMIC CARDIOMYOPATHY): Chronic | ICD-10-CM

## 2020-09-11 DIAGNOSIS — I49.8 OTHER SPECIFIED CARDIAC ARRHYTHMIAS: ICD-10-CM

## 2020-09-11 PROCEDURE — 93010 RHYTHM STRIP: ICD-10-PCS | Mod: S$PBB,,, | Performed by: INTERNAL MEDICINE

## 2020-09-11 PROCEDURE — 93005 ELECTROCARDIOGRAM TRACING: CPT | Mod: PBBFAC | Performed by: INTERNAL MEDICINE

## 2020-09-11 PROCEDURE — 99999 PR PBB SHADOW E&M-EST. PATIENT-LVL III: CPT | Mod: PBBFAC,,, | Performed by: NURSE PRACTITIONER

## 2020-09-11 PROCEDURE — 99214 OFFICE O/P EST MOD 30 MIN: CPT | Mod: S$PBB,,, | Performed by: NURSE PRACTITIONER

## 2020-09-11 PROCEDURE — 99214 PR OFFICE/OUTPT VISIT, EST, LEVL IV, 30-39 MIN: ICD-10-PCS | Mod: S$PBB,,, | Performed by: NURSE PRACTITIONER

## 2020-09-11 PROCEDURE — 93010 ELECTROCARDIOGRAM REPORT: CPT | Mod: S$PBB,,, | Performed by: INTERNAL MEDICINE

## 2020-09-11 PROCEDURE — 99213 OFFICE O/P EST LOW 20 MIN: CPT | Mod: PBBFAC,25 | Performed by: NURSE PRACTITIONER

## 2020-09-11 PROCEDURE — 99999 PR PBB SHADOW E&M-EST. PATIENT-LVL III: ICD-10-PCS | Mod: PBBFAC,,, | Performed by: NURSE PRACTITIONER

## 2020-09-11 RX ORDER — METOPROLOL SUCCINATE 50 MG/1
50 TABLET, EXTENDED RELEASE ORAL 2 TIMES DAILY
Qty: 60 TABLET | Refills: 11 | Status: SHIPPED | OUTPATIENT
Start: 2020-09-11 | End: 2020-10-28

## 2020-09-14 NOTE — PROGRESS NOTES
Diabetes Education  Author: Titus Rushing RD  Date: 9/14/2020    Diabetes Care Management Summary  Diabetes Education Record Assessment/Progress: Initial  Current Diabetes Risk Level: Moderate     Last A1c:   Lab Results   Component Value Date    HGBA1C 7.8 (H) 09/03/2020     Last visit with Diabetes Educator: Last Education Visit: Not Found      Diabetes Type  Diabetes Type : Type II    Diabetes History  Diabetes Diagnosis: 0-1 year  Current Treatment: Oral Medication, Injectable(Glimepride, Victoza, Metformin)  Reviewed Problem List with Patient: Yes    Health Maintenance was reviewed today with patient. Discussed with patient importance of routine eye exams, foot exams/foot care, blood work (i.e.: A1c, microalbumin, and lipid), dental visits, yearly flu vaccine, and pneumonia vaccine as indicated by PCP. Patient verbalized understanding.     Health Maintenance Topics with due status: Not Due       Topic Last Completion Date    Mammogram 02/28/2019    Lipid Panel 09/03/2020    Hemoglobin A1c 09/03/2020     Health Maintenance Due   Topic Date Due    Foot Exam  08/31/1975    Eye Exam  08/31/1975    HIV Screening  08/31/1980    TETANUS VACCINE  08/31/1983    Pneumococcal Vaccine (Medium Risk) (1 of 1 - PPSV23) 08/31/1984    Shingles Vaccine (1 of 2) 08/31/2015    Colorectal Cancer Screening  08/24/2016       Nutrition  What type of beverages do you drink?: juice, regular soda/tea  Meal Plan 24 Hour Recall - Breakfast: two eggs, sausage or ham, grits, hash brown  Meal Plan 24 Hour Recall - Lunch: beans and rice, chicken, sandwich  Meal Plan 24 Hour Recall - Dinner: same as lunch, garlic bread  Meal Plan 24 Hour Recall - Snack: pie, cookies, fruit    Monitoring   Self Monitoring : TID but sometimes forget  Blood Glucose Logs: No  Do you use a personal continuous glucose monitor?: No  In the last month, how often have you had a low blood sugar reaction?: never  Can you tell when your blood sugar is too high?:  no    Exercise   Exercise Type: none(will occasionally do leg and arm lifts)    Current Diabetes Treatment   Current Treatment: Oral Medication, Injectable(Glimepride, Victoza, Metformin)    Social History  Preferred Learning Method: Face to Face  Primary Support: Self      Barriers to Change  Barriers to Change: None  Learning Challenges : None    Readiness to Learn   Readiness to Learn : Acceptance    Cultural Influences  Cultural Influences: None    Diabetes Education Assessment/Progress  Diabetes Disease Process (diabetes disease process and treatment options): Written Materials Provided, Discussion, Individual Session, Demonstrates Understanding/Competency(verbalizes/demonstrates), Comprehends Key Points, Instructed  Nutrition (Incorporating nutritional management into one's lifestyle): Written Materials Provided, Discussion, Individual Session, Comprehends Key Points, Instructed, Needs Review  Physical Activity (incorporating physical activity into one's lifestyle): Written Materials Provided, Discussion, Individual Session, Demonstrates Understanding/Competency (verbalizes/demonstrates), Comprehends Key Points, Instructed  Medications (states correct name, dose, onset, peak, duration, side effects & timing of meds): Written Materials Provided, Discussion, Individual Session, Demonstrates Understanding/Competency(verbalizes/demonstrates), Comprehends Key Points, Instructed  Monitoring (monitoring blood glucose/other parameters & using results): Written Materials Provided, Discussion, Individual Session, Demonstrates Understanding/Competency (verbalizes/demonstrates), Comprehends Key Points, Instructed  Acute Complications (preventing, detecting, and treating acute complications): Written Materials Provided, Discussion, Comprehends Key Points  Chronic Complications (preventing, detecting, and treating chronic complications): Written Materials Provided, Discussion, Comprehends Key Points  Clinical (diabetes,  other pertinent medical history, and relevant comorbidities reviewed during visit): Discussion, Comprehends Key Points  Cognitive (knowledge of self-management skills, functional health literacy): Discussion, Comprehends Key Points  Psychosocial (emotional response to diabetes): Discussion, Comprehends Key Points  Diabetes Distress and Support Systems: Not Covered/Deferred  Behavioral (readiness for change, lifestyle practices, self-care behaviors): Discussion, Comprehends Key Points    Goals  Patient has selected/evaluated goals during today's session: Yes, selected  Healthy Eating: Set(Patient will limit CHO intake at meals ot have bg wnl)  Start Date: 08/31/20         Diabetes Care Plan/Intervention  Education Plan/Intervention: Individual Follow-Up DSMT    Diabetes Meal Plan  Restrictions: Restricted Carbohydrate    Today's Self-Management Care Plan was developed with the patient's input and is based on barriers identified during today's assessment.    The long and short-term goals in the care plan were written with the patient/caregiver's input. The patient has agreed to work toward these goals to improve her overall diabetes control.      The patient received a copy of today's self-management plan and verbalized understanding of the care plan, goals, and all of today's instructions.      The patient was encouraged to communicate with her physician and care team regarding her condition(s) and treatment.  I provided the patient with my contact information today and encouraged her to contact me via phone or patient portal as needed.     Education Units of Time   Time Spent: 45 min

## 2020-09-16 ENCOUNTER — TELEPHONE (OUTPATIENT)
Dept: PRIMARY CARE CLINIC | Facility: CLINIC | Age: 55
End: 2020-09-16

## 2020-09-21 ENCOUNTER — TELEPHONE (OUTPATIENT)
Dept: ELECTROPHYSIOLOGY | Facility: CLINIC | Age: 55
End: 2020-09-21

## 2020-09-21 DIAGNOSIS — I50.42 CHRONIC COMBINED SYSTOLIC AND DIASTOLIC HEART FAILURE: Primary | ICD-10-CM

## 2020-09-21 NOTE — TELEPHONE ENCOUNTER
Tiffany,   Can you please call to schedule patient to see [facility]our[facility]? She was due early October. Will need device check as well. Thanks

## 2020-09-21 NOTE — TELEPHONE ENCOUNTER
----- Message from Tiffany Paulson MA sent at 9/21/2020  9:17 AM CDT -----  Contact: PT    ----- Message -----  From: Vandana Thomas  Sent: 9/21/2020   9:03 AM CDT  To: Sukhwinder Rojas Staff    Pt called to get results and asked for a call back

## 2020-09-21 NOTE — TELEPHONE ENCOUNTER
Spoke with patient. She is very tearful and states we are the only ones calling her back. She is complaining of severe nausea and thinks it is likely due to isosorbide. In reviewing her meds, it was noted that she is also on amiodarone 400mg (from Dr Cortez). She missed an appt with him due to evacuation. Advised that she needs to start with Dr Garcia to review HF meds. She does have an appt with him this week. She is also going to call Dr Cortez to reschedule that missed appt. She will ask him to send a note to us. Will schedule to see Lourdes as well. Patient does not want to go to ER, states she is just tired of being nauseated and thinks it is likely her isosorbide. She will discuss with Dr Garcia this week.

## 2020-09-23 ENCOUNTER — OFFICE VISIT (OUTPATIENT)
Dept: TRANSPLANT | Facility: CLINIC | Age: 55
End: 2020-09-23
Payer: MEDICAID

## 2020-09-23 VITALS
WEIGHT: 231.5 LBS | SYSTOLIC BLOOD PRESSURE: 168 MMHG | BODY MASS INDEX: 37.21 KG/M2 | HEIGHT: 66 IN | DIASTOLIC BLOOD PRESSURE: 79 MMHG | HEART RATE: 70 BPM

## 2020-09-23 DIAGNOSIS — I51.7 LEFT VENTRICULAR HYPERTROPHY: Chronic | ICD-10-CM

## 2020-09-23 DIAGNOSIS — I42.8 NICM (NONISCHEMIC CARDIOMYOPATHY): Chronic | ICD-10-CM

## 2020-09-23 DIAGNOSIS — I47.10 PAROXYSMAL SUPRAVENTRICULAR TACHYCARDIA: Primary | Chronic | ICD-10-CM

## 2020-09-23 PROCEDURE — 99213 OFFICE O/P EST LOW 20 MIN: CPT | Mod: PBBFAC | Performed by: INTERNAL MEDICINE

## 2020-09-23 PROCEDURE — 99999 PR PBB SHADOW E&M-EST. PATIENT-LVL III: ICD-10-PCS | Mod: PBBFAC,,, | Performed by: INTERNAL MEDICINE

## 2020-09-23 PROCEDURE — 99999 PR PBB SHADOW E&M-EST. PATIENT-LVL III: CPT | Mod: PBBFAC,,, | Performed by: INTERNAL MEDICINE

## 2020-09-23 PROCEDURE — 99215 OFFICE O/P EST HI 40 MIN: CPT | Mod: S$PBB,,, | Performed by: INTERNAL MEDICINE

## 2020-09-23 PROCEDURE — 99215 PR OFFICE/OUTPT VISIT, EST, LEVL V, 40-54 MIN: ICD-10-PCS | Mod: S$PBB,,, | Performed by: INTERNAL MEDICINE

## 2020-09-23 RX ORDER — GLIMEPIRIDE 2 MG/1
2 TABLET ORAL DAILY
COMMUNITY
End: 2022-02-03 | Stop reason: SDUPTHER

## 2020-09-23 RX ORDER — AMIODARONE HYDROCHLORIDE 400 MG/1
400 TABLET ORAL DAILY
COMMUNITY
End: 2020-10-28

## 2020-09-23 NOTE — PROGRESS NOTES
Subjective:     HPI:  Ms. Hawley is a 55 y.o. year old Black or  female who has presents for one month follow up.Seen by Dr Lorenzo to be evaluated as a potential heart transplant recipient (referred by Junior / Dana)   At her initial visit in July 2018, she was told to complete her pheochromocytoma. She was seen by [facility] who feel she is unlikely to have pheochromocytoma  They recommend that we can pursue heart transplant workup.      She is here for follow up after hospitalization. Has nausea at night and choking after taking metoprolol    Ms. Hawley is a 54 y.o. female with DM, CKDIII, SVT (RFA 5/2019), NICM, CHF (last EF 35-40% 9/2019) has ICD, HTN, MELISSA, paroxysmal atrial fibrillation (on eliquis), Fibromyalgia presents to the emergency department with weakness, fatigue, shortness of breath.  Patient was recently diagnosed with COVID on 08/12/2020 [facility] was admitted for 4 days and discharged to self quarantine.  Since COVID diagnosis patient states that she has continued to have fatigue and shortness of breath with additional complaint nonspecific and atypical epigastric discomfort and sternal pressure.  She reports that the symptoms have progressively worsened over the last couple days and has also noticed arise in her blood pressure.  This prompted patient to come to the emergency department.  She denies any fever, chills, cough, nausea vomiting.  Patient has longstanding cardiac history and has been followed by cardiologist who she recently saw on 08/26/2020.      In the ED vital signs are stable.  Troponin found to be 0.523.  .  CMP showing CKD 3.  CBC showing microcytic anemia.  EKG showing sinus tachycardia with left axis deviation and left ventricular hypertrophy no STEMI.  Patient was given aspirin in the ED and started on heparin drip for NSTEMI.      During rounds, patient endorsed extra socioeconomic stressors she has been dealing  with recently. Patient was very tearful reporting she worries a lot about her health and how to pay for expenses while taking care of her 15 yo granddaughter. Pt reports lack of sleep and feelings of guilt.         Hospital Course:   Per EMR and EKG, patient has ongoing issues of SVT. She is followed closely with a cardiologist who is aware of these issues. Per Cards, elevated trops is at her baseline and EKG showed no changes. Heparin drip stopped. Cards signed off.  Pt reports feeling better. With the stressors and insomnia, started patient on trazadone. Repeat troponin on 9/4 trending down.      9/5 pt reports minimal sleep due to stressors. She tolerated PO diet and is feeling better. EKG unchanged from admission. Troponin .0.4, lower than her baseline.      · Mild-Moderately decreased left ventricular systolic function. The estimated ejection fraction is 35-40%  · Significantly thickened myocardium, moderate concentric left ventricular hypertrophy.  · Grade I (mild) left ventricular diastolic dysfunction consistent with impaired relaxation.  · Mild left ventricular enlargement.  · Moderate left atrial enlargement.  · Normal right ventricular systolic function.  · Mild mitral regurgitation.  · Trivial Pericardial effusion.  · Intermediate central venous pressure (8 mm Hg).  · The estimated PA systolic pressure is 43 mm Hg      Patient was placed on I.V Lasix and this improved SOB and currently home lasix increased to 80 mg BID. Small dose aldactone added too. Patient asked to take extra dose lasix for gain of weight as advised before. She did not take extra dose for past 2 months as thought weight gain was sec to change in diet habits and her gaining weight and not fluid. Also asked to be compliant with low salt diet(was not for past 2 months as she was with family or went out to eat). A flutter on admit sec to acute on chronic Combined HF. Was on Amiodarone drip for only few hours and then had sinus  bradycardia. Currently in NSR and rate controlled with home dose Coreg. Cont Eliquis. She is now being d/beronica home in a stable condition and cont to f/u with Cardiology as out patient.      PREVIOUS HISTORY    Patient most recently admitted at [facility] with ADHF, BNP significantly elevated at 2573, had to be diuresed, additionally had episode of VT x 1, and chest pain overnight. Had elevated troponin as well at around .6, felt to be due to ADHF and tachycardia. Bili was elevated as well, bili all the way up to 5.6. Last visit was sent to hepatology and they plan to do US  elastography but felt ok to proceed with LVAD/OHT workup. Last visit she was to increase isordil to 2 tablets TID but did not do this. Today, feels less SOB. Has some mild musculoskeletal pains she feels is related to her AICD.      TTE 08/31/18:    1 - Severely depressed left ventricular systolic function (EF 20-25%).     2 - Impaired LV relaxation, elevated LAP (grade 2 diastolic dysfunction).     3 - Pulmonary hypertension. The estimated PA systolic pressure is 43 mmHg.     4 - Mild mitral regurgitation.     5 - Increased central venous pressure.     6 - Moderate pulmonic regurgitation.     7- LVH     Six minute walk 06/21/2018---------Distance: 292.61 meters (960 feet)     O2 Sat % Supplemental Oxygen Heart Rate Blood Pressure Haseeb   Scale   Pre-exercise  (Resting) 98 % Room Air 70 bpm 173/97 mmHg 0.5   During Exercise 95 % Room Air 80 bpm 143/100 mmHg 5-6   Post-exercise  (Recovery) 98 % Room Air  68 bpm 141/93 mmHg       FINAL PATHOLOGIC DIAGNOSIS  Liver, random, biopsy:  - Minimal steatosis, predominantly macrovesicular, 1%.  - Minimal nonspecific portal inflammation.  - No significant fibrosis seen.  - PAS-D and copper pending, results will be issued in an addendum.  - See comment.  COMMENT: The biopsy is adequate for evaluation. The patient's history of elevated total bilirubin is noted and  select information from the electronic medical  record is reviewed. The biopsy is relatively unremarkable with  minimal steatosis and nonspecific portal inflammation. There is no definitive steatohepatitis. There is no  cholestasis or ductular reaction seen. The bile ducts are unremarkable. There is no significant plasma cell  population seen. There are no granulomas seen. There is no increased iron on iron stain. There is no increased  fibrosis on trichrome stain. There is no clear etiology for elevated bilirubin seen on this biopsy. Appropriate positive  controls are examined.  Diagnosed      Past Medical History:   Diagnosis Date    Anemia     Anticoagulant long-term use     Arthritis     Atrial fibrillation     Congestive heart failure     COPD (chronic obstructive pulmonary disease)     Deep vein thrombosis     elevated bilirubin d/t Gilbert's syndrome     confirmed by [facility] [facility] genetic testing, evaluated by hepatology    Encounter for blood transfusion     GERD (gastroesophageal reflux disease)     Hypertension     Pheochromocytoma, malignant     Right kidney mass     Sleep apnea     Thyroid disease     Type 2 diabetes mellitus with hyperglycemia, without long-term current use of insulin 2020     Past Surgical History:   Procedure Laterality Date    APPENDECTOMY      CARDIAC DEFIBRILLATOR PLACEMENT Left 2016    EYE SURGERY      due to running tears    FRACTURE SURGERY Left     hand 5th digit    HYSTERECTOMY      KNEE SURGERY Left 2016    hematoma    LIVER BIOPSY  10/24/2018    Minimal steatosis, predominantly macrovesicular, 1%, Minimal nonspecific portal inflammation, no fibrosis. No findings on biopsy to explain elevated bilirubin levels. Could be d/t Gilbert's =?- hemolysis    TRANSJUGULAR BIOPSY OF LIVER N/A 10/24/2018    Procedure: BIOPSY, LIVER, TRANSJUGULAR APPROACH;  Surgeon: Carmen Diagnostic Provider;  Location: [facility] OR [address] [place] [address];  Service: Radiology;  Laterality: N/A;     OB History        2    Para   2    Term   2  "           AB        Living           SAB        TAB        Ectopic        Multiple        Live Births                   Review of Systems   Constitution: Negative for chills, decreased appetite, diaphoresis, fever and weight gain.   Cardiovascular: Positive for dyspnea on exertion. Negative for chest pain, claudication, cyanosis and paroxysmal nocturnal dyspnea.   Respiratory: Negative for cough, hemoptysis, shortness of breath, sleep disturbances due to breathing and snoring.    Skin: Negative for nail changes.   Gastrointestinal: Negative for bloating, nausea and vomiting.   Neurological: Negative for weakness.   Psychiatric/Behavioral: Negative for depression.       Objective:   Blood pressure (!) 168/79, pulse 70, height 5' 6" (1.676 m), weight 105 kg (231 lb 7.7 oz).body mass index is 37.36 kg/m².  Physical Exam   Constitutional: She appears well-developed.   HENT:   Head: Normocephalic.   Eyes: Pupils are equal, round, and reactive to light.   Neck: Normal range of motion. No JVD present.   Cardiovascular: Normal rate. Exam reveals no friction rub.   No murmur heard.  Pulmonary/Chest: Effort normal. No respiratory distress.   Abdominal: Soft. She exhibits no distension. There is no abdominal tenderness.   Musculoskeletal: Normal range of motion.         General: Edema (+3) present.   Neurological: She is alert. No cranial nerve deficit.   Skin: Skin is warm. She is not diaphoretic. No erythema.       Labs:    Chemistry        Component Value Date/Time     2020 0907    K 3.8 2020 0907     2020 0907    CO2 27 2020 0907    BUN 16 2020 0907    CREATININE 1.3 2020 0907     (H) 2020 0907        Component Value Date/Time    CALCIUM 8.8 2020 0907    ALKPHOS 55 2020 0907    AST 21 2020 0907    ALT 18 2020 0907    BILITOT 1.4 (H) 2020 0907    ESTGFRAFRICA 53.4 (A) 2020 0907    EGFRNONAA 46.3 (A) 2020 0907    "       Magnesium   Date Value Ref Range Status   09/23/2020 1.5 (L) 1.6 - 2.6 mg/dL Final     Lab Results   Component Value Date    WBC 4.62 09/04/2020    HGB 8.0 (L) 09/04/2020    HCT 27.6 (L) 09/04/2020     (L) 09/04/2020     Lab Results   Component Value Date    INR 1.0 09/03/2020    INR 1.0 09/03/2020    INR 1.1 06/21/2019     BNP   Date Value Ref Range Status   09/03/2020 385 (H) 0 - 99 pg/mL Final     Comment:     Values of less than 100 pg/ml are consistent with non-CHF populations.   08/07/2019 219 (H) 0 - 99 pg/mL Final     Comment:     Values of less than 100 pg/ml are consistent with non-CHF populations.   02/14/2019 301 (H) 0 - 99 pg/mL Final     Comment:     Values of less than 100 pg/ml are consistent with non-CHF populations.     LD   Date Value Ref Range Status   09/03/2020 537 (H) 110 - 260 U/L Final     Comment:     Results are increased in hemolyzed samples.   06/11/2018 308 (H) 110 - 260 U/L Final     Comment:     Results are increased in hemolyzed samples.   08/23/2016 280 (H) 110 - 260 U/L Final     Comment:     Results are increased in hemolyzed samples.     No results found for this or any previous visit.  No results found for this or any previous visit.    Assessment:      1. Paroxysmal supraventricular tachycardia    2. NICM (nonischemic cardiomyopathy)    3. Left ventricular hypertrophy        Plan:   HFrEF FC II NYHA on entresto 97/103     not to take metoprolol at night    RTC 3 months      Patient is now NYHA II  Recommend 2 gram sodium restriction and 1500cc fluid restriction.  Encourage physical activity with graded exercise program.  Requested patient to weigh themselves daily, and to notify us if their weight increases by more than 3 lbs in 1 day or 5 lbs in 1 week.     Listed for transplant: No

## 2020-09-23 NOTE — LETTER
September 23, 2020        Andrea Pacheco  [address]  [address]  [place] [address]  Phone: [phone]  Fax: [phone]             [id number]  [address]  [facility] [address]  Phone: [phone]   Patient: Hafsa Hawley   MR Number: [id number]   YOB: 1965   Date of Visit: 9/23/2020       Dear Dr. Andrae Pacheco    Thank you for referring Hafsa Hawley to me for evaluation. Attached you will find relevant portions of my assessment and plan of care.    If you have questions, please do not hesitate to call me. I look forward to following Hafsa Hawley along with you.    Sincerely,    Armando Garcia MD    Enclosure    If you would like to receive this communication electronically, please contact [email or web address] or [phone] to request [employer] access.    [employer] is a tool which provides read-only access to select patient information with whom you have a relationship. Its easy to use and provides real time access to review your patients record including encounter summaries, notes, results, and demographic information.    If you feel you have received this communication in error or would no longer like to receive these types of communications, please e-mail [email or web address]

## 2020-09-29 ENCOUNTER — TELEPHONE (OUTPATIENT)
Dept: DIABETES | Facility: CLINIC | Age: 55
End: 2020-09-29

## 2020-09-29 NOTE — TELEPHONE ENCOUNTER
----- Message from John Young sent at 9/28/2020  2:54 PM CDT -----  Type:  Needs Medical Advice    Who Called: patient  Would the patient rather a call back or a response via [facility]?    Call Back Number: [phone]  Additional Information: She missede her appointment and she has a few questions.

## 2020-10-14 ENCOUNTER — TELEPHONE (OUTPATIENT)
Dept: TRANSPLANT | Facility: CLINIC | Age: 55
End: 2020-10-14

## 2020-10-14 NOTE — TELEPHONE ENCOUNTER
1225 pm:  Returned call to pt.  Pt asking when her next appt is and I reviewed the 9/23/2020 note /plan by  and told her in 3 months and looks to be scheduled for the end of November.   Pt said she is driving to an appt and will call me back later this afternoon  Provided pt with this office phone number and she repeated back to me correctly          --- Message from Adrienne Edwards sent at 10/14/2020 10:38 AM CDT -----  Regarding: FW: Questions  Good morning    Please see message below    Thanks  Adrienne    ----- Message -----  From: Patience Farnsworth  Sent: 10/7/2020   3:44 PM CDT  To: [facility] Heart Transplant Schedulers  Subject: Questions                                        Pt asking if can call regarding transplant program, why she is still coming to clinic even though not on the list any longer.  Thanks     [phone]

## 2020-10-27 ENCOUNTER — TELEPHONE (OUTPATIENT)
Dept: ELECTROPHYSIOLOGY | Facility: CLINIC | Age: 55
End: 2020-10-27

## 2020-10-27 NOTE — TELEPHONE ENCOUNTER
Called MsTyra Chandan to remind her of her appt on tomorrow morning w/ our NP. Left message and call back number.

## 2020-10-27 NOTE — PROGRESS NOTES
Ms. Hawley is a patient of Dr. Pretty and was last seen in clinic 9/11/2020.      Subjective:   Patient ID:  Hafsa Hawley is a 55 y.o. female who presents for follow-up of Tachycardia  .     HPI:    Ms. Hawley is a 55 y.o. female with SVT (RFA 5/2019), NICM, CHF, HTN, Sleep Apnea, paroxysmal atrial fibrillation (on eliquis), Fibromyalgia, ICD, alpha thalassemia here for follow up.      Background:    Primary cardiologist is Dr. Cortez.  Longstanding history of non-ischemic cardiomyopathy.  [facility] 11/15/15 EF 30-35% with normal coronary arteries.  Has been on optimal medical therapy.  Admitted to [facility] 9/16 with acute decompensated Heart failure. Diuresed 12 liters.  Echo 8/24/16 EF 20%.  Repeat echo 10/17/16 EF 30-35% with small pericardial effusion.  ICD implanted 12/2/16 (VDD single pass lead).    8/20/2018: Went to ED with chest tightness and palpitations. HR in 150s and found to be in SVT. Given diltiazem and spontaneously converted back to sinus rhythm.   8/30/2018: CHF hospitalization. No arrhythmia during this admission.  10/21/2018: ED with CP. No arrhythmia during this admission. Device check 10/29/2019 showed SVTx2, max 52 seconds during this period.  11/17/2018: ED with CP. SVT on EKG. Spontaneous conversion.  1/27/2019: ED with chest pain and SOB. She was given Adnoesine 6mg x 1 with conversion to SR. Device check 1/30/2019 showed SVTx19, longest 10 minutes.  On EKG SVT c/w AVNRT. Ablation planned.    Underwent successful slow pathway modification on 5/23/2019. No palpitations reported at clinic visit 7/2019.   SVT/ST on device report 2/2020 - patient asymptomatic. On carvedilol 25mg BID. No RV pacing. No changes.    Patient hospitalized with covid-19 on 8/12/2020-8/16/2020. Was hospitalized her coreg was decreased due to bradycardia.   She was diagnosed with new-onset DM during hospitalization. Started on metformin.    Clinic visit 8/26/2020 she reports that she continues to feel unwell. Has  continued SOB. Some palpitations. Weakness. In a wheechair today.   Device Interrogation (8/23/2020 - remote) reveals an intrinsic SR with stable lead and device function. No ventricular arrhythmias or treated episodes were noted. No AF. SVT/STx23 episodes. Last episode 8/22/2020 16 seconds c/w ST.  Brief ST/SVT seen on device. No AF or VT. Her coreg was decreased from 25mg BID to 3.125mg BID in hospital.   Stop carvedilol.  Start metoprolol succinate 25mg BID.    9/2/2020: hospitalized with elevated troponin. ASA and heparin gtt started in EF. Ultimately it was determined that her elevated troponin mostly 2/2 uncontrolled HTN and decompensated heart failure and less likely 2/2 ACS  At clinic visit 9/11/2020, ST seen on device. Metoprolol increased.     Update (10/28/2020):    9/21/2020: Patient called with complaints of nausea. At the time she reported having been started on amiodarone 400mg by Dr. Cortez.    Today she says that she has not taken amiodarone since before her hospitalization for covid. (Nurse note in epic says she was on 400mg but she says this is not true). She is reporting nausea and vomiting throughout the day. She says this has persisted since her hospitalization for covid in August. This persisted despite stopping metformin. She also reports tremors, off balance, and periods of brief chest discomfort (not consistently associated with exertion). She feels chills.     She is currently taking eliquis 5mg BID for stroke prophylaxis and denies significant bleeding episodes. She is currently being treated with metoprolol 50mg BID for HR control.  Kidney function is stable, with a creatinine of 1.3 on 9/23/2020.    Device Interrogation (10/28/2020) reveals an intrinsic SR with stable lead and device function. SVT/ST episodes x99. Rates up to 130s. No AF. No sustained ventricular arrhythmia. She paces 0% in the RV. Estimated battery longevity 77% remaining.    I have personally reviewed the patient's EKG  today, which shows sinus rhythm at 89bpm. VT interval is 182. QRS is 100. QTc is 481.    Recent Cardiac Tests:    2D Echo (9/17/2019):  · Mild-Moderately decreased left ventricular systolic function. The estimated ejection fraction is 35-40%  · Significantly thickened myocardium, moderate concentric left ventricular hypertrophy.  · Grade I (mild) left ventricular diastolic dysfunction consistent with impaired relaxation.  · Mild left ventricular enlargement.  · Moderate left atrial enlargement.  · Normal right ventricular systolic function.  · Mild mitral regurgitation.  · Trivial Pericardial effusion.  · Intermediate central venous pressure (8 mm Hg).  · The estimated PA systolic pressure is 43 mm Hg    Current Outpatient Medications   Medication Sig    albuterol-ipratropium 2.5mg-0.5mg/3mL (DUO-NEB) 0.5 mg-3 mg(2.5 mg base)/3 mL nebulizer solution Take 1 mL by nebulization every 6 (six) hours as needed for Wheezing or Shortness of Breath.    amiodarone (PACERONE) 400 MG tablet Take 400 mg by mouth once daily.    atorvastatin (LIPITOR) 40 MG tablet Take 1 tablet (40 mg total) by mouth once daily.    b complex vitamins tablet Take 1 tablet by mouth once daily.    blood sugar diagnostic Strp 3 times daily    blood-glucose meter kit Use as instructed    budesonide-formoterol 80-4.5 mcg (SYMBICORT) 80-4.5 mcg/actuation HFAA Inhale 2 puffs twice a day by inhalation route.    cyanocobalamin, vitamin B-12, 50 mcg tablet Take 5 mcg by mouth once daily.    diclofenac sodium (VOLTAREN) 1 % Gel Apply 2 g topically 3 (three) times daily.    ELIQUIS 5 mg Tab TAKE 1 TABLET BY MOUTH TWICE A DAY    ergocalciferol (ERGOCALCIFEROL) 50,000 unit Cap ergocalciferol (vitamin D2) 50,000 unit capsule   Take 1 capsule every week by oral route.    fluticasone propionate (FLONASE) 50 mcg/actuation nasal spray Spray 2 sprays every day by intranasal route.    fluticasone-salmeterol diskus inhaler 100-50 mcg Inhale 1 puff into the  lungs 2 (two) times daily. Controller    furosemide (LASIX) 80 MG tablet TAKE 1 TAB TWICE A DAY TAKE EXTRA DOSE FOR GAIN OF 2 OR MORE POUNDS IN 1 DAY OR 5 POUNDS IN 1 WEEK    glimepiride (AMARYL) 2 MG tablet Take 2 mg by mouth once daily.    isosorbide dinitrate (ISORDIL) 20 MG tablet Take 1 tablet (20 mg total) by mouth 3 (three) times daily.    lidocaine (LIDODERM) 5 % Place 1 patch onto the skin once daily. Keep patch on for 12 hours then remove. One patch daily (12 hours on/12 hours off).    metoprolol succinate (TOPROL-XL) 50 MG 24 hr tablet Take 1 tablet (50 mg total) by mouth 2 (two) times daily.    montelukast (SINGULAIR) 10 mg tablet Take 1 tablet every day by oral route for 30 days.    NITROSTAT 0.4 mg SL tablet Take 2.5 tablets (1 mg total) by mouth every 5 (five) minutes as needed for Chest pain. No more than 3 tablets in 15 minutes.    ondansetron (ZOFRAN-ODT) 8 MG TbDL Dissolve 1 tablet (8 mg total) by mouth every 6 (six) hours as needed.    pantoprazole (PROTONIX) 40 MG tablet Take 1 tablet by mouth once daily.    potassium chloride (KLOR-CON) 10 MEQ TbSR Take 1 tablet (10 mEq total) by mouth once daily.    rOPINIRole 6 mg Tb24 Take 0.5 mg by mouth once daily.     sacubitril-valsartan (ENTRESTO)  mg per tablet Take 1 tablet by mouth 2 (two) times daily.    traZODone (DESYREL) 50 MG tablet Take 1 tablet (50 mg total) by mouth every evening.    VENTOLIN HFA 90 mcg/actuation inhaler Inhale 2 puffs into the lungs 2 (two) times daily as needed.     walker Misc 1 Device by Misc.(Non-Drug; Combo Route) route as needed.     No current facility-administered medications for this visit.      Facility-Administered Medications Ordered in Other Visits   Medication    0.9%  NaCl infusion    vancomycin in dextrose 5 % 1 gram/250 mL IVPB 1,000 mg     Review of Systems   Constitution: Positive for chills. Negative for malaise/fatigue.   Cardiovascular: Positive for chest pain (chronic, atypical).  "Negative for dyspnea on exertion, irregular heartbeat, leg swelling and palpitations.   Respiratory: Positive for cough and shortness of breath.    Hematologic/Lymphatic: Negative for bleeding problem.   Skin: Negative for rash.   Musculoskeletal: Negative for myalgias.   Gastrointestinal: Positive for constipation, nausea and vomiting. Negative for hematemesis and hematochezia.   Genitourinary: Negative for hematuria.   Neurological: Negative for light-headedness.   Psychiatric/Behavioral: Negative for altered mental status.   Allergic/Immunologic: Negative for persistent infections.     Objective:        /81   Pulse 89   Ht 5' 6" (1.676 m)   Wt 100.9 kg (222 lb 7.1 oz)   LMP  (LMP Unknown)   BMI 35.90 kg/m²     Physical Exam   Constitutional: She is oriented to person, place, and time. She appears well-developed and well-nourished.   HENT:   Head: Normocephalic.   Nose: Nose normal.   Eyes: Pupils are equal, round, and reactive to light.   Cardiovascular: Normal rate and regular rhythm.   Pulmonary/Chest: Breath sounds normal. No respiratory distress.   Device to LUCW.   Abdominal: Normal appearance.   Musculoskeletal: Normal range of motion.         General: No edema.   Neurological: She is alert and oriented to person, place, and time.   Skin: Skin is warm and dry. No erythema.   Psychiatric: She has a normal mood and affect. Her speech is normal and behavior is normal.   Nursing note and vitals reviewed.    Lab Results   Component Value Date     09/23/2020    K 3.8 09/23/2020    MG 1.5 (L) 09/23/2020    BUN 16 09/23/2020    CREATININE 1.3 09/23/2020    ALT 18 09/23/2020    AST 21 09/23/2020    HGB 8.0 (L) 09/04/2020    HCT 27.6 (L) 09/04/2020    TSH 1.332 09/03/2020    LDLCALC 108.6 09/03/2020     Recent Labs   Lab 05/16/19  0936 06/21/19  1136 09/03/20  0336 09/03/20  0913   INR 1.2 1.1 1.0 1.0         Assessment:     1. Paroxysmal atrial fibrillation    2. NICM (nonischemic cardiomyopathy)  "   3. ICD (implantable cardioverter-defibrillator) in place    4. MELISSA (obstructive sleep apnea)    5. Cardiac defibrillator in place    6. Paroxysmal supraventricular tachycardia      Plan:     In summary, Ms. Hawley is a 55 y.o. female with SVT (RFA 5/2019), NICM, CHF, HTN, Sleep Apnea, paroxysmal atrial fibrillation (on eliquis), Fibromyalgia, ICD, alpha thalassemia here for follow up.   She has a number of complaints, including chronic chest discomfort, nausea, vomiting, chills. She has not been feeling well since her hospitalization for covid in August. Possible her symptoms are c/w post-viral or long covid syndrome. Other possibilities include intolerance to her newer diabetes medications. She has a lot of questions about her various medications, particularly those prescribed for nausea or diabetes. Note: while amiodarone was listed on her chart (as having been started by Dr. Cortez), she says she is not taking this medication. Her device shows primarily ST episodes into the 130s. No AF or VT. Will continue to up titrate her beta blocker (she had been on max coreg prior to covid and feeling well). Her tachy episodes may be a result of her feeling unwell. I advised her to follow up with PCP for her symptoms. Will discuss case with Dr. Pretty. (UPDATE: Will continue to increase BB for now per Dr. Pretty)    Increase metoprolol to 100mg AM, 50mg PM.  Continue other medications and device checks.  RTC 3 mo with Dr. Pretty, sooner if needed.    *A copy of this note has been sent to Dr. Pretty*    Follow up in about 3 months (around 1/28/2021).    ------------------------------------------------------------------    LYNN Fields, NP-C  Cardiac Electrophysiology

## 2020-10-28 ENCOUNTER — TELEPHONE (OUTPATIENT)
Dept: ELECTROPHYSIOLOGY | Facility: CLINIC | Age: 55
End: 2020-10-28

## 2020-10-28 ENCOUNTER — CLINICAL SUPPORT (OUTPATIENT)
Dept: CARDIOLOGY | Facility: HOSPITAL | Age: 55
End: 2020-10-28
Attending: NURSE PRACTITIONER
Payer: MEDICAID

## 2020-10-28 ENCOUNTER — OFFICE VISIT (OUTPATIENT)
Dept: ELECTROPHYSIOLOGY | Facility: CLINIC | Age: 55
End: 2020-10-28
Payer: MEDICAID

## 2020-10-28 VITALS
BODY MASS INDEX: 35.75 KG/M2 | HEIGHT: 66 IN | SYSTOLIC BLOOD PRESSURE: 133 MMHG | DIASTOLIC BLOOD PRESSURE: 81 MMHG | HEART RATE: 89 BPM | WEIGHT: 222.44 LBS

## 2020-10-28 DIAGNOSIS — I49.8 OTHER SPECIFIED CARDIAC ARRHYTHMIAS: ICD-10-CM

## 2020-10-28 DIAGNOSIS — I47.10 PAROXYSMAL SUPRAVENTRICULAR TACHYCARDIA: Chronic | ICD-10-CM

## 2020-10-28 DIAGNOSIS — Z95.810 ICD (IMPLANTABLE CARDIOVERTER-DEFIBRILLATOR) IN PLACE: Chronic | ICD-10-CM

## 2020-10-28 DIAGNOSIS — Z95.810 CARDIAC DEFIBRILLATOR IN PLACE: ICD-10-CM

## 2020-10-28 DIAGNOSIS — I42.9 CARDIOMYOPATHY, UNSPECIFIED TYPE: ICD-10-CM

## 2020-10-28 DIAGNOSIS — I48.0 PAROXYSMAL ATRIAL FIBRILLATION: Primary | Chronic | ICD-10-CM

## 2020-10-28 DIAGNOSIS — G47.33 OSA (OBSTRUCTIVE SLEEP APNEA): Chronic | ICD-10-CM

## 2020-10-28 DIAGNOSIS — I42.8 NICM (NONISCHEMIC CARDIOMYOPATHY): Chronic | ICD-10-CM

## 2020-10-28 DIAGNOSIS — Z95.810 AUTOMATIC IMPLANTABLE CARDIAC DEFIBRILLATOR IN SITU: Primary | ICD-10-CM

## 2020-10-28 PROCEDURE — 93282 PRGRMG EVAL IMPLANTABLE DFB: CPT

## 2020-10-28 PROCEDURE — 93010 ELECTROCARDIOGRAM REPORT: CPT | Mod: S$PBB,,, | Performed by: INTERNAL MEDICINE

## 2020-10-28 PROCEDURE — 99214 OFFICE O/P EST MOD 30 MIN: CPT | Mod: S$PBB,,, | Performed by: NURSE PRACTITIONER

## 2020-10-28 PROCEDURE — 93282 PRGRMG EVAL IMPLANTABLE DFB: CPT | Mod: 26,,, | Performed by: NURSE PRACTITIONER

## 2020-10-28 PROCEDURE — 93282 CARDIAC DEVICE CHECK - IN CLINIC & HOSPITAL: ICD-10-PCS | Mod: 26,,, | Performed by: NURSE PRACTITIONER

## 2020-10-28 PROCEDURE — 99215 OFFICE O/P EST HI 40 MIN: CPT | Mod: PBBFAC,25 | Performed by: NURSE PRACTITIONER

## 2020-10-28 PROCEDURE — 99214 PR OFFICE/OUTPT VISIT, EST, LEVL IV, 30-39 MIN: ICD-10-PCS | Mod: S$PBB,,, | Performed by: NURSE PRACTITIONER

## 2020-10-28 PROCEDURE — 99999 PR PBB SHADOW E&M-EST. PATIENT-LVL V: CPT | Mod: PBBFAC,,, | Performed by: NURSE PRACTITIONER

## 2020-10-28 PROCEDURE — 93010 RHYTHM STRIP: ICD-10-PCS | Mod: S$PBB,,, | Performed by: INTERNAL MEDICINE

## 2020-10-28 PROCEDURE — 93005 ELECTROCARDIOGRAM TRACING: CPT | Mod: PBBFAC | Performed by: INTERNAL MEDICINE

## 2020-10-28 PROCEDURE — 99999 PR PBB SHADOW E&M-EST. PATIENT-LVL V: ICD-10-PCS | Mod: PBBFAC,,, | Performed by: NURSE PRACTITIONER

## 2020-10-28 RX ORDER — METOPROLOL SUCCINATE 50 MG/1
TABLET, EXTENDED RELEASE ORAL
Qty: 90 TABLET | Refills: 11 | Status: ON HOLD | OUTPATIENT
Start: 2020-10-28 | End: 2021-09-22 | Stop reason: HOSPADM

## 2020-10-28 NOTE — Clinical Note
Pt with hx of slow pathway modification 5/2019. Now 2 mo s/p covid and she hasn't been doing well since. SVT/STx99 rates in the 130s. Most episodes <3min. No AF/VT. She is not feeling well - primarily N/V. Just wanted to confirm that you are ok continuing to increase her BB - she is asking about another EP study but it seems like she needs to get better first then see if she continues to have these episodes? Thx.

## 2020-10-28 NOTE — PATIENT INSTRUCTIONS
Increase metoprolol to 100mg in the morning, 50mg in the evening.  Make sure you are not taking amiodarone.

## 2020-11-05 ENCOUNTER — NURSE TRIAGE (OUTPATIENT)
Dept: ADMINISTRATIVE | Facility: CLINIC | Age: 55
End: 2020-11-05

## 2020-11-06 NOTE — TELEPHONE ENCOUNTER
Patient states she was recently diagnosed with diabetes  2 months ago and bs is low, 63. Care advice discussed. Teaching given. Understanding verbalized. Please contact caller directly to discuss any further care advice.    Reason for Disposition   [1] Blood glucose < 70 mg/dL (3.9 mmol/L) or symptomatic AND [2] cause known    Additional Information   Negative: Unconscious or difficult to awaken   Negative: Seizure occurs   Negative: Acting confused (e.g., disoriented, slurred speech)   Negative: Very weak (e.g., can't stand)   Negative: Sounds like a life-threatening emergency to the triager   Negative: [1] Vomiting AND [2] signs of dehydration (e.g., very dry mouth, lightheaded, dark urine)   Negative: [1] Low blood sugar symptoms persist > 30 minutes AND [2] using low blood sugar Care Advice   Negative: [1] Low blood glucose (< 70 mg/dL  or 3.9 mmol/L) persists > 30 minutes AND [2] using low blood sugar Care Advice   Negative: Patient sounds very sick or weak to the triager   Negative: [1] Low blood sugar symptoms with no other adult present AND [2] hasn't tried Care Advice   Negative: [1] Low blood glucose (< 70 mg/dL  or 3.9 mmol/L) with no other adult present AND [2] hasn't tried Care Advice   Negative: Diabetes drug error or overdose (e.g., insulin error or extra dose)   Negative: [1] Caller has URGENT medication or insulin pump question AND [2] triager unable to answer question   Negative: [1] Blood glucose < 70  mg/dL (3.9 mmol/L) or symptomatic, now improved with Care Advice AND [2] cause unknown   Negative: [1] Caller has NON-URGENT medication or insulin pump question AND [2] triager unable to answer question   Negative: [1] Morning (before breakfast) blood glucose < 80 mg/dL (4.4 mmol/L) AND [2] more than once in past week   Negative: [1] Evening (after bedtime snack) blood glucose < 100 mg/dL (5.6 mmol/L) AND [2] more than once in past week    Protocols used: DIABETES - LOW BLOOD  SUGAR-A-AH

## 2020-11-07 ENCOUNTER — CLINICAL SUPPORT (OUTPATIENT)
Dept: CARDIOLOGY | Facility: HOSPITAL | Age: 55
End: 2020-11-07
Payer: MEDICAID

## 2020-11-07 DIAGNOSIS — Z95.810 PRESENCE OF AUTOMATIC (IMPLANTABLE) CARDIAC DEFIBRILLATOR: ICD-10-CM

## 2020-11-07 PROCEDURE — 93295 DEV INTERROG REMOTE 1/2/MLT: CPT | Mod: ,,, | Performed by: INTERNAL MEDICINE

## 2020-11-07 PROCEDURE — 93295 CARDIAC DEVICE CHECK - REMOTE: ICD-10-PCS | Mod: ,,, | Performed by: INTERNAL MEDICINE

## 2020-11-07 PROCEDURE — 93296 REM INTERROG EVL PM/IDS: CPT | Performed by: INTERNAL MEDICINE

## 2020-11-23 ENCOUNTER — OFFICE VISIT (OUTPATIENT)
Dept: TRANSPLANT | Facility: CLINIC | Age: 55
End: 2020-11-23
Payer: MEDICAID

## 2020-11-23 VITALS
HEIGHT: 66 IN | OXYGEN SATURATION: 96 % | WEIGHT: 228.19 LBS | BODY MASS INDEX: 36.67 KG/M2 | HEART RATE: 92 BPM | DIASTOLIC BLOOD PRESSURE: 72 MMHG | SYSTOLIC BLOOD PRESSURE: 137 MMHG

## 2020-11-23 DIAGNOSIS — G47.33 OSA (OBSTRUCTIVE SLEEP APNEA): Chronic | ICD-10-CM

## 2020-11-23 DIAGNOSIS — I50.42 CHRONIC COMBINED SYSTOLIC AND DIASTOLIC HEART FAILURE: Primary | ICD-10-CM

## 2020-11-23 DIAGNOSIS — M79.7 FIBROMYALGIA AFFECTING MULTIPLE SITES: Chronic | ICD-10-CM

## 2020-11-23 DIAGNOSIS — J44.9 CHRONIC OBSTRUCTIVE PULMONARY DISEASE, UNSPECIFIED COPD TYPE: Chronic | ICD-10-CM

## 2020-11-23 DIAGNOSIS — I10 ESSENTIAL HYPERTENSION: Chronic | ICD-10-CM

## 2020-11-23 DIAGNOSIS — Z95.810 ICD (IMPLANTABLE CARDIOVERTER-DEFIBRILLATOR) IN PLACE: Chronic | ICD-10-CM

## 2020-11-23 DIAGNOSIS — I47.10 PAROXYSMAL SUPRAVENTRICULAR TACHYCARDIA: Chronic | ICD-10-CM

## 2020-11-23 PROCEDURE — 99213 OFFICE O/P EST LOW 20 MIN: CPT | Mod: PBBFAC | Performed by: INTERNAL MEDICINE

## 2020-11-23 PROCEDURE — 99215 PR OFFICE/OUTPT VISIT, EST, LEVL V, 40-54 MIN: ICD-10-PCS | Mod: S$PBB,,, | Performed by: INTERNAL MEDICINE

## 2020-11-23 PROCEDURE — 99999 PR PBB SHADOW E&M-EST. PATIENT-LVL III: ICD-10-PCS | Mod: PBBFAC,,, | Performed by: INTERNAL MEDICINE

## 2020-11-23 PROCEDURE — 99215 OFFICE O/P EST HI 40 MIN: CPT | Mod: S$PBB,,, | Performed by: INTERNAL MEDICINE

## 2020-11-23 PROCEDURE — 99999 PR PBB SHADOW E&M-EST. PATIENT-LVL III: CPT | Mod: PBBFAC,,, | Performed by: INTERNAL MEDICINE

## 2020-11-23 NOTE — PROGRESS NOTES
Subjective:     HPI:  Ms. Hawley is a 55 y.o. year old Black or  female who has presents for one month follow up.Seen by Dr Lorenzo to be evaluated as a potential heart transplant recipient (referred by Junior / Dana)   At her initial visit in July 2018, she was told to complete her pheochromocytoma. She was seen by [facility] who feel she is unlikely to have pheochromocytoma  They recommend that we can pursue heart transplant workup.      She is here for follow up heart failure. Doing OK NO major issues Some shortness of breath      Ms. Hawley is a 55 y.o. female with DM, CKDIII, SVT (RFA 5/2019), NICM, CHF (last EF 35-40% 9/2019) has ICD, HTN, MELISSA, paroxysmal atrial fibrillation (on eliquis), Fibromyalgia presents to the emergency department with weakness, fatigue, shortness of breath.  Patient was recently diagnosed with COVID on 08/12/2020 [facility] was admitted for 4 days and discharged to self quarantine.  Since COVID diagnosis patient states that she has continued to have fatigue and shortness of breath with additional complaint nonspecific and atypical epigastric discomfort and sternal pressure.  She reports that the symptoms have progressively worsened over the last couple days and has also noticed arise in her blood pressure.  This prompted patient to come to the emergency department.  She denies any fever, chills, cough, nausea vomiting.  Patient has longstanding cardiac history and has been followed by cardiologist who she recently saw on 08/26/2020.      In the ED vital signs are stable.  Troponin found to be 0.523.  .  CMP showing CKD 3.  CBC showing microcytic anemia.  EKG showing sinus tachycardia with left axis deviation and left ventricular hypertrophy no STEMI.  Patient was given aspirin in the ED and started on heparin drip for NSTEMI.      During rounds, patient endorsed extra socioeconomic stressors she has been dealing with  recently. Patient was very tearful reporting she worries a lot about her health and how to pay for expenses while taking care of her 17 yo granddaughter. Pt reports lack of sleep and feelings of guilt.         Hospital Course:   Per EMR and EKG, patient has ongoing issues of SVT. She is followed closely with a cardiologist who is aware of these issues. Per Cards, elevated trops is at her baseline and EKG showed no changes. Heparin drip stopped. Cards signed off.  Pt reports feeling better. With the stressors and insomnia, started patient on trazadone. Repeat troponin on 9/4 trending down.      9/5 pt reports minimal sleep due to stressors. She tolerated PO diet and is feeling better. EKG unchanged from admission. Troponin .0.4, lower than her baseline.      · Mild-Moderately decreased left ventricular systolic function. The estimated ejection fraction is 35-40%  · Significantly thickened myocardium, moderate concentric left ventricular hypertrophy.  · Grade I (mild) left ventricular diastolic dysfunction consistent with impaired relaxation.  · Mild left ventricular enlargement.  · Moderate left atrial enlargement.  · Normal right ventricular systolic function.  · Mild mitral regurgitation.  · Trivial Pericardial effusion.  · Intermediate central venous pressure (8 mm Hg).  · The estimated PA systolic pressure is 43 mm Hg      Patient was placed on I.V Lasix and this improved SOB and currently home lasix increased to 80 mg BID. Small dose aldactone added too. Patient asked to take extra dose lasix for gain of weight as advised before. She did not take extra dose for past 2 months as thought weight gain was sec to change in diet habits and her gaining weight and not fluid. Also asked to be compliant with low salt diet(was not for past 2 months as she was with family or went out to eat). A flutter on admit sec to acute on chronic Combined HF. Was on Amiodarone drip for only few hours and then had sinus bradycardia.  Currently in NSR and rate controlled with home dose Coreg. Cont Eliquis. She is now being d/beronica home in a stable condition and cont to f/u with Cardiology as out patient.      PREVIOUS HISTORY    Patient most recently admitted at [facility] with ADHF, BNP significantly elevated at 2573, had to be diuresed, additionally had episode of VT x 1, and chest pain overnight. Had elevated troponin as well at around .6, felt to be due to ADHF and tachycardia. Bili was elevated as well, bili all the way up to 5.6. Last visit was sent to hepatology and they plan to do US  elastography but felt ok to proceed with LVAD/OHT workup. Last visit she was to increase isordil to 2 tablets TID but did not do this. Today, feels less SOB. Has some mild musculoskeletal pains she feels is related to her AICD.      TTE 08/31/18:    1 - Severely depressed left ventricular systolic function (EF 20-25%).     2 - Impaired LV relaxation, elevated LAP (grade 2 diastolic dysfunction).     3 - Pulmonary hypertension. The estimated PA systolic pressure is 43 mmHg.     4 - Mild mitral regurgitation.     5 - Increased central venous pressure.     6 - Moderate pulmonic regurgitation.     7- LVH     Six minute walk 06/21/2018---------Distance: 292.61 meters (960 feet)     O2 Sat % Supplemental Oxygen Heart Rate Blood Pressure Haseeb   Scale   Pre-exercise  (Resting) 98 % Room Air 70 bpm 173/97 mmHg 0.5   During Exercise 95 % Room Air 80 bpm 143/100 mmHg 5-6   Post-exercise  (Recovery) 98 % Room Air  68 bpm 141/93 mmHg       FINAL PATHOLOGIC DIAGNOSIS  Liver, random, biopsy:  - Minimal steatosis, predominantly macrovesicular, 1%.  - Minimal nonspecific portal inflammation.  - No significant fibrosis seen.  - PAS-D and copper pending, results will be issued in an addendum.  - See comment.  COMMENT: The biopsy is adequate for evaluation. The patient's history of elevated total bilirubin is noted and  select information from the electronic medical record is  reviewed. The biopsy is relatively unremarkable with  minimal steatosis and nonspecific portal inflammation. There is no definitive steatohepatitis. There is no  cholestasis or ductular reaction seen. The bile ducts are unremarkable. There is no significant plasma cell  population seen. There are no granulomas seen. There is no increased iron on iron stain. There is no increased  fibrosis on trichrome stain. There is no clear etiology for elevated bilirubin seen on this biopsy. Appropriate positive  controls are examined.  Diagnosed      Past Medical History:   Diagnosis Date    Anemia     Anticoagulant long-term use     Arthritis     Atrial fibrillation     Congestive heart failure     COPD (chronic obstructive pulmonary disease)     Deep vein thrombosis     elevated bilirubin d/t Gilbert's syndrome     confirmed by [place] genetic testing, evaluated by hepatology    Encounter for blood transfusion     GERD (gastroesophageal reflux disease)     Hypertension     Pheochromocytoma, malignant     Right kidney mass     Sleep apnea     Thyroid disease     Type 2 diabetes mellitus with hyperglycemia, without long-term current use of insulin 2020     Past Surgical History:   Procedure Laterality Date    APPENDECTOMY      CARDIAC DEFIBRILLATOR PLACEMENT Left 2016    EYE SURGERY      due to running tears    FRACTURE SURGERY Left     hand 5th digit    HYSTERECTOMY      KNEE SURGERY Left 2016    hematoma    LIVER BIOPSY  10/24/2018    Minimal steatosis, predominantly macrovesicular, 1%, Minimal nonspecific portal inflammation, no fibrosis. No findings on biopsy to explain elevated bilirubin levels. Could be d/t Gilbert's =?- hemolysis    TRANSJUGULAR BIOPSY OF LIVER N/A 10/24/2018    Procedure: BIOPSY, LIVER, TRANSJUGULAR APPROACH;  Surgeon: Carmen Diagnostic Provider;  Location: [facility] OR [address] [place] [address];  Service: Radiology;  Laterality: N/A;     OB History        2    Para   2    Term   2      "       AB        Living           SAB        TAB        Ectopic        Multiple        Live Births                   Review of Systems   Constitution: Negative for chills, decreased appetite, diaphoresis, fever and weight gain.   Cardiovascular: Positive for dyspnea on exertion. Negative for chest pain, claudication, cyanosis and paroxysmal nocturnal dyspnea.   Respiratory: Negative for cough, hemoptysis, shortness of breath, sleep disturbances due to breathing and snoring.    Skin: Negative for nail changes.   Gastrointestinal: Negative for bloating, nausea and vomiting.   Neurological: Negative for weakness.   Psychiatric/Behavioral: Negative for depression.       Objective:   Blood pressure 137/72, pulse 92, height 5' 6" (1.676 m), weight 103.5 kg (228 lb 2.8 oz), SpO2 96 %.body mass index is 36.83 kg/m².  Physical Exam   Constitutional: She appears well-developed.   HENT:   Head: Normocephalic.   Eyes: Pupils are equal, round, and reactive to light.   Neck: Normal range of motion. No JVD present.   Cardiovascular: Normal rate. Exam reveals no friction rub.   No murmur heard.  Pulmonary/Chest: Effort normal. No respiratory distress.   Abdominal: Soft. She exhibits no distension. There is no abdominal tenderness.   Musculoskeletal: Normal range of motion.         General: Edema (+3) present.   Neurological: She is alert. No cranial nerve deficit.   Skin: Skin is warm. She is not diaphoretic. No erythema.       Labs:    Chemistry        Component Value Date/Time     09/23/2020 0907    K 3.8 09/23/2020 0907     09/23/2020 0907    CO2 27 09/23/2020 0907    BUN 16 09/23/2020 0907    CREATININE 1.3 09/23/2020 0907     (H) 09/23/2020 0907        Component Value Date/Time    CALCIUM 8.8 09/23/2020 0907    ALKPHOS 55 09/23/2020 0907    AST 21 09/23/2020 0907    ALT 18 09/23/2020 0907    BILITOT 1.4 (H) 09/23/2020 0907    ESTGFRAFRICA 53.4 (A) 09/23/2020 0907    EGFRNONAA 46.3 (A) 09/23/2020 0907    "       Magnesium   Date Value Ref Range Status   09/23/2020 1.5 (L) 1.6 - 2.6 mg/dL Final     Lab Results   Component Value Date    WBC 4.62 09/04/2020    HGB 8.0 (L) 09/04/2020    HCT 27.6 (L) 09/04/2020     (L) 09/04/2020     Lab Results   Component Value Date    INR 1.0 09/03/2020    INR 1.0 09/03/2020    INR 1.1 06/21/2019     BNP   Date Value Ref Range Status   09/03/2020 385 (H) 0 - 99 pg/mL Final     Comment:     Values of less than 100 pg/ml are consistent with non-CHF populations.   08/07/2019 219 (H) 0 - 99 pg/mL Final     Comment:     Values of less than 100 pg/ml are consistent with non-CHF populations.   02/14/2019 301 (H) 0 - 99 pg/mL Final     Comment:     Values of less than 100 pg/ml are consistent with non-CHF populations.     LD   Date Value Ref Range Status   09/03/2020 537 (H) 110 - 260 U/L Final     Comment:     Results are increased in hemolyzed samples.   06/11/2018 308 (H) 110 - 260 U/L Final     Comment:     Results are increased in hemolyzed samples.   08/23/2016 280 (H) 110 - 260 U/L Final     Comment:     Results are increased in hemolyzed samples.     No results found for this or any previous visit.  No results found for this or any previous visit.    Assessment:      1. Chronic combined systolic and diastolic heart failure    2. Essential hypertension    3. ICD (implantable cardioverter-defibrillator) in place    4. Paroxysmal supraventricular tachycardia    5. Chronic obstructive pulmonary disease, unspecified COPD type    6. MELISSA (obstructive sleep apnea)    7. Fibromyalgia affecting multiple sites        Plan:   HFrEF FC II NYHA on entresto 97/103     RTC 3 months      Patient is now NYHA II  Recommend 2 gram sodium restriction and 1500cc fluid restriction.  Encourage physical activity with graded exercise program.  Requested patient to weigh themselves daily, and to notify us if their weight increases by more than 3 lbs in 1 day or 5 lbs in 1 week.     Listed for transplant:  No

## 2020-11-23 NOTE — LETTER
November 23, 2020        Andrea Pacheco  [address]  [address]  [place] [address]  Phone: [phone]  Fax: [phone]             [id number]  [address]  [facility] [address]  Phone: [phone]   Patient: Hafsa Hawley   MR Number: [id number]   YOB: 1965   Date of Visit: 11/23/2020       Dear Dr. Andrea Pacheco    Thank you for referring Hafsa Hawley to me for evaluation. Attached you will find relevant portions of my assessment and plan of care.    If you have questions, please do not hesitate to call me. I look forward to following Hafsa Hawley along with you.    Sincerely,    Armando Garcia MD    Enclosure    If you would like to receive this communication electronically, please contact [email or web address] or [phone] to request [employer] Link access.    [employer] Link is a tool which provides read-only access to select patient information with whom you have a relationship. Its easy to use and provides real time access to review your patients record including encounter summaries, notes, results, and demographic information.    If you feel you have received this communication in error or would no longer like to receive these types of communications, please e-mail [email or web address]

## 2020-12-17 NOTE — ASSESSMENT & PLAN NOTE
Cr increased 1.2 to 1.5 today.   Monitor closely while on IV lasix. Decreased from 80 to 60 mg IV.   yes

## 2020-12-25 NOTE — PT/OT/SLP EVAL
Physical Therapy Evaluation and Discharge Note    Patient Name:  Hafsa Hawley   MRN:  [id number]    Recommendations:     Discharge Recommendations:   (Outpatient PT or cardiopulmonary)   Discharge Equipment Recommendations:  (Rollator for long distances)   Barriers to discharge: None    Assessment:     Hafsa Hawley is a 52 y.o. female admitted with a medical diagnosis of Shortness of breath. .  At this time, patient is functioning at their prior level of function and does not require further acute PT services.     Recent Surgery: * No surgery found *      Plan:     During this hospitalization, patient does not require further acute PT services.  Please re-consult if situation changes.     Plan of Care Reviewed with: patient    Subjective     Communicated with nsg prior to session.  Patient found with HOB elevated upon PT entry to room, agreeable to evaluation.      Chief Complaint: back pain, weakness in LE's   Patient comments/goals: Pt states that she needs Rollator and TTB   Pain/Comfort:  · Pain Rating 1: 5/10  · Location 1: back  · Pain Addressed 1: Reposition, Distraction, Cessation of Activity  · Pain Rating Post-Intervention 1: 5/10    Patients cultural, spiritual, Catholic conflicts given the current situation: none    Living Environment:  Pt lives with Adult son and dependent [facility].  No concerns  Prior to admission, patients level of function was Mod Indep.  Patient has the following equipment: CPAP, nebulizer.  DME owned (not currently used): none.  Upon discharge, patient will have assistance from family.    Objective:     Patient found with:       General Precautions: Standard, fall, contact, droplet   Orthopedic Precautions:N/A   Braces: N/A     Exams:  · Cognitive Exam:  Patient is oriented to Person, Place, Time and Situation and follows 100%% of simple commands   · Gross Motor Coordination:  WFL  · RLE ROM: WFL  · RLE Strength: WFL  · LLE ROM: WFL  · LLE Strength: WFL   · Balance  [facility] HOSPITALIST  Progress Note     Luz Elena Villa Patient Status:  Inpatient    1945 MRN [id number]   [facility] [place] [facility] 4NW-A Attending Portillo Ruggiero, DO   Hosp Day # 4 PCP Judy Germain MD     Chief Complaint: weakness, LE swelling CO2 22.0 24.0 24.0 25.0   ALKPHO 113  --   --   --    AST 11*  --   --   --    ALT 15  --   --   --    BILT 0.9  --   --   --    TP 7.9  --   --   --        Estimated Creatinine Clearance: 29.2 mL/min (A) (based on SCr of 1.56 mg/dL (H)).     No results f Good    Functional Mobility:  · Pt is Mod Indep with all bed mobility, t/f's, and ambulation x 500'    AM-PAC 6 CLICK MOBILITY  Total Score:24       Therapeutic Activities and Exercises:   N/A    Patient left HOB elevated with all lines intact, call button in reach and nsg notified.    GOALS:    Physical Therapy Goals     Not on file          Multidisciplinary Problems (Resolved)        Problem: Physical Therapy Goal    Goal Priority Disciplines Outcome Goal Variances Interventions   Physical Therapy Goal   (Resolved)     PT/OT, PT Outcome(s) achieved                     History:     Past Medical History:   Diagnosis Date    Anemia     Anticoagulant long-term use     Arthritis     Atrial fibrillation     Congestive heart failure     COPD (chronic obstructive pulmonary disease)     Encounter for blood transfusion     GERD (gastroesophageal reflux disease)     Hypertension     Right kidney mass     Sleep apnea     Thyroid disease        Past Surgical History:   Procedure Laterality Date    APPENDECTOMY      CARDIAC DEFIBRILLATOR PLACEMENT Left 12/2016    CARDIAC DEFIBRILLATOR PLACEMENT  2016    EYE SURGERY      FRACTURE SURGERY Left     hand 5th digit    HYSTERECTOMY      left knee mass removal Left        Clinical Decision Making:   Not completed  Time Tracking:     PT Received On: 12/21/17  PT Start Time: 1330     PT Stop Time: 1343  PT Total Time (min): 13 min     Billable Minutes: Evaluation 13      Kalie Monet, PT  12/21/2017   test if any loose stool  9. HLD - Statin  10. Essential HTN  11. Hx of PAF  1. NSR  2. BB  3. Tele  12. Morbid Obesity  1.  BMI ~42    Plan of care:   as above  D/c planning pending disposition - have advised patient consider MO, she is considering but katherine

## 2021-01-19 ENCOUNTER — TELEPHONE (OUTPATIENT)
Dept: ELECTROPHYSIOLOGY | Facility: CLINIC | Age: 56
End: 2021-01-19

## 2021-02-03 ENCOUNTER — CLINICAL SUPPORT (OUTPATIENT)
Dept: CARDIOLOGY | Facility: HOSPITAL | Age: 56
End: 2021-02-03
Attending: INTERNAL MEDICINE
Payer: MEDICAID

## 2021-02-03 ENCOUNTER — OFFICE VISIT (OUTPATIENT)
Dept: ELECTROPHYSIOLOGY | Facility: CLINIC | Age: 56
End: 2021-02-03
Payer: MEDICAID

## 2021-02-03 ENCOUNTER — HOSPITAL ENCOUNTER (OUTPATIENT)
Dept: CARDIOLOGY | Facility: CLINIC | Age: 56
Discharge: HOME | End: 2021-02-03
Attending: INTERNAL MEDICINE
Payer: MEDICAID

## 2021-02-03 VITALS
HEIGHT: 66 IN | DIASTOLIC BLOOD PRESSURE: 90 MMHG | BODY MASS INDEX: 35.65 KG/M2 | HEART RATE: 98 BPM | WEIGHT: 221.81 LBS | SYSTOLIC BLOOD PRESSURE: 144 MMHG

## 2021-02-03 DIAGNOSIS — N18.30 STAGE 3 CHRONIC KIDNEY DISEASE, UNSPECIFIED WHETHER STAGE 3A OR 3B CKD: Chronic | ICD-10-CM

## 2021-02-03 DIAGNOSIS — F41.9 ANXIETY AND DEPRESSION: ICD-10-CM

## 2021-02-03 DIAGNOSIS — I42.8 NICM (NONISCHEMIC CARDIOMYOPATHY): Chronic | ICD-10-CM

## 2021-02-03 DIAGNOSIS — I42.9 CARDIOMYOPATHY, UNSPECIFIED TYPE: ICD-10-CM

## 2021-02-03 DIAGNOSIS — E11.65 TYPE 2 DIABETES MELLITUS WITH HYPERGLYCEMIA, WITHOUT LONG-TERM CURRENT USE OF INSULIN: ICD-10-CM

## 2021-02-03 DIAGNOSIS — Z95.810 AUTOMATIC IMPLANTABLE CARDIAC DEFIBRILLATOR IN SITU: ICD-10-CM

## 2021-02-03 DIAGNOSIS — I50.42 CHRONIC COMBINED SYSTOLIC AND DIASTOLIC HEART FAILURE: ICD-10-CM

## 2021-02-03 DIAGNOSIS — F32.A ANXIETY AND DEPRESSION: ICD-10-CM

## 2021-02-03 DIAGNOSIS — J44.9 CHRONIC OBSTRUCTIVE PULMONARY DISEASE, UNSPECIFIED COPD TYPE: Chronic | ICD-10-CM

## 2021-02-03 DIAGNOSIS — Z95.810 ICD (IMPLANTABLE CARDIOVERTER-DEFIBRILLATOR) IN PLACE: Chronic | ICD-10-CM

## 2021-02-03 DIAGNOSIS — I48.0 PAROXYSMAL ATRIAL FIBRILLATION: Primary | Chronic | ICD-10-CM

## 2021-02-03 DIAGNOSIS — G47.33 OSA (OBSTRUCTIVE SLEEP APNEA): Chronic | ICD-10-CM

## 2021-02-03 PROCEDURE — 93010 RHYTHM STRIP: ICD-10-PCS | Mod: S$PBB,,, | Performed by: INTERNAL MEDICINE

## 2021-02-03 PROCEDURE — 99214 OFFICE O/P EST MOD 30 MIN: CPT | Mod: S$PBB,,, | Performed by: INTERNAL MEDICINE

## 2021-02-03 PROCEDURE — 99999 PR PBB SHADOW E&M-EST. PATIENT-LVL III: ICD-10-PCS | Mod: PBBFAC,,, | Performed by: INTERNAL MEDICINE

## 2021-02-03 PROCEDURE — 93010 ELECTROCARDIOGRAM REPORT: CPT | Mod: S$PBB,,, | Performed by: INTERNAL MEDICINE

## 2021-02-03 PROCEDURE — 99999 PR PBB SHADOW E&M-EST. PATIENT-LVL III: CPT | Mod: PBBFAC,,, | Performed by: INTERNAL MEDICINE

## 2021-02-03 PROCEDURE — 99213 OFFICE O/P EST LOW 20 MIN: CPT | Mod: PBBFAC,25 | Performed by: INTERNAL MEDICINE

## 2021-02-03 PROCEDURE — 93005 ELECTROCARDIOGRAM TRACING: CPT | Mod: PBBFAC,59 | Performed by: INTERNAL MEDICINE

## 2021-02-03 PROCEDURE — 93280 CARDIAC DEVICE CHECK - IN CLINIC & HOSPITAL: ICD-10-PCS | Mod: 26,,, | Performed by: INTERNAL MEDICINE

## 2021-02-03 PROCEDURE — 99214 PR OFFICE/OUTPT VISIT, EST, LEVL IV, 30-39 MIN: ICD-10-PCS | Mod: S$PBB,,, | Performed by: INTERNAL MEDICINE

## 2021-02-03 PROCEDURE — 93280 PM DEVICE PROGR EVAL DUAL: CPT | Mod: 26,,, | Performed by: INTERNAL MEDICINE

## 2021-02-03 PROCEDURE — 93280 PM DEVICE PROGR EVAL DUAL: CPT

## 2021-02-03 RX ORDER — ALPRAZOLAM 2 MG/1
TABLET ORAL NIGHTLY PRN
Status: ON HOLD | COMMUNITY
End: 2021-09-22 | Stop reason: HOSPADM

## 2021-02-03 RX ORDER — OMEPRAZOLE 40 MG/1
CAPSULE, DELAYED RELEASE ORAL
Status: ON HOLD | COMMUNITY
End: 2021-03-15

## 2021-02-03 RX ORDER — EMPAGLIFLOZIN 25 MG/1
25 TABLET, FILM COATED ORAL DAILY
Status: ON HOLD | COMMUNITY
End: 2023-02-22 | Stop reason: SDUPTHER

## 2021-02-05 ENCOUNTER — CLINICAL SUPPORT (OUTPATIENT)
Dept: CARDIOLOGY | Facility: HOSPITAL | Age: 56
End: 2021-02-05
Payer: MEDICAID

## 2021-02-05 DIAGNOSIS — Z95.810 PRESENCE OF AUTOMATIC (IMPLANTABLE) CARDIAC DEFIBRILLATOR: ICD-10-CM

## 2021-02-05 PROCEDURE — 93295 DEV INTERROG REMOTE 1/2/MLT: CPT | Mod: ,,, | Performed by: INTERNAL MEDICINE

## 2021-02-05 PROCEDURE — 93296 REM INTERROG EVL PM/IDS: CPT | Performed by: INTERNAL MEDICINE

## 2021-02-05 PROCEDURE — 93295 CARDIAC DEVICE CHECK - REMOTE: ICD-10-PCS | Mod: ,,, | Performed by: INTERNAL MEDICINE

## 2021-02-08 ENCOUNTER — TELEPHONE (OUTPATIENT)
Dept: CARDIOLOGY | Facility: HOSPITAL | Age: 56
End: 2021-02-08

## 2021-02-23 ENCOUNTER — OFFICE VISIT (OUTPATIENT)
Dept: TRANSPLANT | Facility: CLINIC | Age: 56
End: 2021-02-23
Payer: MEDICAID

## 2021-02-23 VITALS
DIASTOLIC BLOOD PRESSURE: 78 MMHG | HEIGHT: 66 IN | WEIGHT: 226.44 LBS | HEART RATE: 97 BPM | SYSTOLIC BLOOD PRESSURE: 155 MMHG | BODY MASS INDEX: 36.39 KG/M2

## 2021-02-23 DIAGNOSIS — N18.30 STAGE 3 CHRONIC KIDNEY DISEASE, UNSPECIFIED WHETHER STAGE 3A OR 3B CKD: Chronic | ICD-10-CM

## 2021-02-23 DIAGNOSIS — I48.0 PAROXYSMAL ATRIAL FIBRILLATION: Chronic | ICD-10-CM

## 2021-02-23 DIAGNOSIS — I51.7 LEFT VENTRICULAR HYPERTROPHY: Chronic | ICD-10-CM

## 2021-02-23 DIAGNOSIS — Z95.810 ICD (IMPLANTABLE CARDIOVERTER-DEFIBRILLATOR) IN PLACE: Chronic | ICD-10-CM

## 2021-02-23 DIAGNOSIS — I50.42 CHRONIC COMBINED SYSTOLIC AND DIASTOLIC HEART FAILURE: ICD-10-CM

## 2021-02-23 DIAGNOSIS — I47.10 PAROXYSMAL SUPRAVENTRICULAR TACHYCARDIA: Chronic | ICD-10-CM

## 2021-02-23 DIAGNOSIS — J39.9 UPPER RESPIRATORY DISEASE: ICD-10-CM

## 2021-02-23 DIAGNOSIS — J41.0 SIMPLE CHRONIC BRONCHITIS: Primary | Chronic | ICD-10-CM

## 2021-02-23 PROCEDURE — 99212 OFFICE O/P EST SF 10 MIN: CPT | Mod: PBBFAC | Performed by: INTERNAL MEDICINE

## 2021-02-23 PROCEDURE — 99999 PR PBB SHADOW E&M-EST. PATIENT-LVL II: ICD-10-PCS | Mod: PBBFAC,,, | Performed by: INTERNAL MEDICINE

## 2021-02-23 PROCEDURE — 99215 PR OFFICE/OUTPT VISIT, EST, LEVL V, 40-54 MIN: ICD-10-PCS | Mod: S$PBB,,, | Performed by: INTERNAL MEDICINE

## 2021-02-23 PROCEDURE — 99215 OFFICE O/P EST HI 40 MIN: CPT | Mod: S$PBB,,, | Performed by: INTERNAL MEDICINE

## 2021-02-23 PROCEDURE — 99999 PR PBB SHADOW E&M-EST. PATIENT-LVL II: CPT | Mod: PBBFAC,,, | Performed by: INTERNAL MEDICINE

## 2021-03-03 PROBLEM — I50.43 ACUTE ON CHRONIC COMBINED SYSTOLIC AND DIASTOLIC CONGESTIVE HEART FAILURE: Status: ACTIVE | Noted: 2021-03-03

## 2021-03-03 PROBLEM — I16.1 HYPERTENSIVE EMERGENCY: Status: ACTIVE | Noted: 2021-03-03

## 2021-03-03 PROBLEM — I50.9 CONGESTIVE HEART FAILURE: Status: ACTIVE | Noted: 2021-03-03

## 2021-03-15 PROBLEM — I50.9 CHF EXACERBATION: Status: ACTIVE | Noted: 2021-03-15

## 2021-03-16 PROBLEM — R09.02 HYPOXIA: Status: ACTIVE | Noted: 2021-03-16

## 2021-03-30 ENCOUNTER — TELEPHONE (OUTPATIENT)
Dept: ELECTROPHYSIOLOGY | Facility: CLINIC | Age: 56
End: 2021-03-30

## 2021-03-31 ENCOUNTER — TELEPHONE (OUTPATIENT)
Dept: ELECTROPHYSIOLOGY | Facility: CLINIC | Age: 56
End: 2021-03-31

## 2021-03-31 ENCOUNTER — TELEPHONE (OUTPATIENT)
Dept: CARDIOLOGY | Facility: HOSPITAL | Age: 56
End: 2021-03-31

## 2021-03-31 DIAGNOSIS — I50.42 CHRONIC COMBINED SYSTOLIC AND DIASTOLIC HEART FAILURE: Primary | ICD-10-CM

## 2021-04-08 ENCOUNTER — OFFICE VISIT (OUTPATIENT)
Dept: TRANSPLANT | Facility: CLINIC | Age: 56
End: 2021-04-08
Payer: MEDICAID

## 2021-04-08 ENCOUNTER — LAB VISIT (OUTPATIENT)
Dept: LAB | Facility: HOSPITAL | Age: 56
End: 2021-04-08
Attending: INTERNAL MEDICINE
Payer: MEDICAID

## 2021-04-08 VITALS
OXYGEN SATURATION: 96 % | WEIGHT: 226.19 LBS | BODY MASS INDEX: 36.35 KG/M2 | DIASTOLIC BLOOD PRESSURE: 64 MMHG | SYSTOLIC BLOOD PRESSURE: 135 MMHG | HEIGHT: 66 IN | HEART RATE: 87 BPM

## 2021-04-08 DIAGNOSIS — Z95.810 ICD (IMPLANTABLE CARDIOVERTER-DEFIBRILLATOR) IN PLACE: Chronic | ICD-10-CM

## 2021-04-08 DIAGNOSIS — I50.42 CHRONIC COMBINED SYSTOLIC AND DIASTOLIC HEART FAILURE: Primary | ICD-10-CM

## 2021-04-08 DIAGNOSIS — N18.30 STAGE 3 CHRONIC KIDNEY DISEASE, UNSPECIFIED WHETHER STAGE 3A OR 3B CKD: Chronic | ICD-10-CM

## 2021-04-08 DIAGNOSIS — G47.33 OSA (OBSTRUCTIVE SLEEP APNEA): Chronic | ICD-10-CM

## 2021-04-08 DIAGNOSIS — E11.65 TYPE 2 DIABETES MELLITUS WITH HYPERGLYCEMIA, WITHOUT LONG-TERM CURRENT USE OF INSULIN: ICD-10-CM

## 2021-04-08 DIAGNOSIS — F32.A ANXIETY AND DEPRESSION: ICD-10-CM

## 2021-04-08 DIAGNOSIS — I10 ESSENTIAL HYPERTENSION: Chronic | ICD-10-CM

## 2021-04-08 DIAGNOSIS — R09.02 HYPOXIA: ICD-10-CM

## 2021-04-08 DIAGNOSIS — I50.42 CHRONIC COMBINED SYSTOLIC AND DIASTOLIC HEART FAILURE: ICD-10-CM

## 2021-04-08 DIAGNOSIS — I48.0 PAROXYSMAL ATRIAL FIBRILLATION: Chronic | ICD-10-CM

## 2021-04-08 DIAGNOSIS — F41.9 ANXIETY AND DEPRESSION: ICD-10-CM

## 2021-04-08 LAB
ANION GAP SERPL CALC-SCNC: 7 MMOL/L (ref 8–16)
BNP SERPL-MCNC: 520 PG/ML (ref 0–99)
BUN SERPL-MCNC: 33 MG/DL (ref 6–20)
CALCIUM SERPL-MCNC: 9.1 MG/DL (ref 8.7–10.5)
CHLORIDE SERPL-SCNC: 107 MMOL/L (ref 95–110)
CO2 SERPL-SCNC: 29 MMOL/L (ref 23–29)
CREAT SERPL-MCNC: 1.6 MG/DL (ref 0.5–1.4)
EST. GFR  (AFRICAN AMERICAN): 41.5 ML/MIN/1.73 M^2
EST. GFR  (NON AFRICAN AMERICAN): 36 ML/MIN/1.73 M^2
GLUCOSE SERPL-MCNC: 192 MG/DL (ref 70–110)
POTASSIUM SERPL-SCNC: 4.4 MMOL/L (ref 3.5–5.1)
SODIUM SERPL-SCNC: 143 MMOL/L (ref 136–145)

## 2021-04-08 PROCEDURE — 99999 PR PBB SHADOW E&M-EST. PATIENT-LVL IV: CPT | Mod: PBBFAC,,, | Performed by: INTERNAL MEDICINE

## 2021-04-08 PROCEDURE — 99215 OFFICE O/P EST HI 40 MIN: CPT | Mod: S$PBB,,, | Performed by: INTERNAL MEDICINE

## 2021-04-08 PROCEDURE — 80048 BASIC METABOLIC PNL TOTAL CA: CPT | Performed by: INTERNAL MEDICINE

## 2021-04-08 PROCEDURE — 99214 OFFICE O/P EST MOD 30 MIN: CPT | Mod: PBBFAC | Performed by: INTERNAL MEDICINE

## 2021-04-08 PROCEDURE — 83880 ASSAY OF NATRIURETIC PEPTIDE: CPT | Performed by: INTERNAL MEDICINE

## 2021-04-08 PROCEDURE — 99999 PR PBB SHADOW E&M-EST. PATIENT-LVL IV: ICD-10-PCS | Mod: PBBFAC,,, | Performed by: INTERNAL MEDICINE

## 2021-04-08 PROCEDURE — 99215 PR OFFICE/OUTPT VISIT, EST, LEVL V, 40-54 MIN: ICD-10-PCS | Mod: S$PBB,,, | Performed by: INTERNAL MEDICINE

## 2021-04-08 PROCEDURE — 36415 COLL VENOUS BLD VENIPUNCTURE: CPT | Performed by: INTERNAL MEDICINE

## 2021-04-08 RX ORDER — SACUBITRIL AND VALSARTAN 97; 103 MG/1; MG/1
1 TABLET, FILM COATED ORAL 2 TIMES DAILY
Qty: 90 TABLET | Refills: 3 | Status: ON HOLD | OUTPATIENT
Start: 2021-04-08 | End: 2021-11-02 | Stop reason: HOSPADM

## 2021-05-06 ENCOUNTER — CLINICAL SUPPORT (OUTPATIENT)
Dept: CARDIOLOGY | Facility: HOSPITAL | Age: 56
End: 2021-05-06
Payer: MEDICAID

## 2021-05-06 DIAGNOSIS — I42.9 CARDIOMYOPATHY, UNSPECIFIED: ICD-10-CM

## 2021-05-06 DIAGNOSIS — Z95.810 PRESENCE OF AUTOMATIC (IMPLANTABLE) CARDIAC DEFIBRILLATOR: ICD-10-CM

## 2021-05-06 PROCEDURE — 93295 CARDIAC DEVICE CHECK - REMOTE: ICD-10-PCS | Mod: ,,, | Performed by: INTERNAL MEDICINE

## 2021-05-06 PROCEDURE — 93295 DEV INTERROG REMOTE 1/2/MLT: CPT | Mod: ,,, | Performed by: INTERNAL MEDICINE

## 2021-05-06 PROCEDURE — 93296 REM INTERROG EVL PM/IDS: CPT | Performed by: INTERNAL MEDICINE

## 2021-05-16 PROBLEM — R06.02 SHORTNESS OF BREATH: Status: ACTIVE | Noted: 2021-05-16

## 2021-05-17 PROBLEM — J18.9 COMMUNITY ACQUIRED PNEUMONIA: Status: ACTIVE | Noted: 2021-05-17

## 2021-05-17 PROBLEM — Z79.899 DVT PROPHYLAXIS: Status: ACTIVE | Noted: 2021-05-17

## 2021-06-03 ENCOUNTER — TELEPHONE (OUTPATIENT)
Dept: ADMINISTRATIVE | Facility: OTHER | Age: 56
End: 2021-06-03

## 2021-06-19 PROBLEM — J81.0 ACUTE PULMONARY EDEMA: Status: ACTIVE | Noted: 2021-06-19

## 2021-06-20 PROBLEM — I16.1 HYPERTENSIVE EMERGENCY: Status: RESOLVED | Noted: 2021-03-03 | Resolved: 2021-06-20

## 2021-06-20 PROBLEM — J96.11 CHRONIC RESPIRATORY FAILURE WITH HYPOXIA: Status: ACTIVE | Noted: 2021-03-16

## 2021-06-20 PROBLEM — J18.9 COMMUNITY ACQUIRED PNEUMONIA: Status: RESOLVED | Noted: 2021-05-17 | Resolved: 2021-06-20

## 2021-06-21 ENCOUNTER — SSC ENCOUNTER (OUTPATIENT)
Dept: ADMINISTRATIVE | Facility: OTHER | Age: 56
End: 2021-06-21

## 2021-08-04 ENCOUNTER — CLINICAL SUPPORT (OUTPATIENT)
Dept: CARDIOLOGY | Facility: HOSPITAL | Age: 56
End: 2021-08-04
Payer: MEDICAID

## 2021-08-04 DIAGNOSIS — Z95.810 PRESENCE OF AUTOMATIC (IMPLANTABLE) CARDIAC DEFIBRILLATOR: ICD-10-CM

## 2021-08-04 PROCEDURE — 93295 CARDIAC DEVICE CHECK - REMOTE: ICD-10-PCS | Mod: ,,, | Performed by: INTERNAL MEDICINE

## 2021-08-04 PROCEDURE — 93296 REM INTERROG EVL PM/IDS: CPT | Performed by: INTERNAL MEDICINE

## 2021-08-04 PROCEDURE — 93295 DEV INTERROG REMOTE 1/2/MLT: CPT | Mod: ,,, | Performed by: INTERNAL MEDICINE

## 2021-08-05 PROBLEM — J96.21 ACUTE ON CHRONIC RESPIRATORY FAILURE WITH HYPOXIA: Status: ACTIVE | Noted: 2021-08-05

## 2021-08-07 PROBLEM — J96.21 ACUTE ON CHRONIC RESPIRATORY FAILURE WITH HYPOXIA: Status: RESOLVED | Noted: 2021-08-05 | Resolved: 2021-08-07

## 2021-08-26 PROBLEM — R06.02 SOB (SHORTNESS OF BREATH): Status: ACTIVE | Noted: 2021-08-26

## 2021-08-28 PROBLEM — R06.02 SOB (SHORTNESS OF BREATH): Status: ACTIVE | Noted: 2021-08-28

## 2021-09-04 PROBLEM — R06.02 SOB (SHORTNESS OF BREATH): Status: RESOLVED | Noted: 2021-08-28 | Resolved: 2021-09-04

## 2021-09-17 ENCOUNTER — HOSPITAL ENCOUNTER (INPATIENT)
Facility: HOSPITAL | Age: 56
LOS: 5 days | Discharge: HOME | DRG: 280 | End: 2021-09-22
Attending: EMERGENCY MEDICINE | Admitting: HOSPITALIST
Payer: MEDICAID

## 2021-09-17 DIAGNOSIS — R06.02 SOB (SHORTNESS OF BREATH): ICD-10-CM

## 2021-09-17 DIAGNOSIS — J44.1 COPD EXACERBATION: Primary | ICD-10-CM

## 2021-09-17 DIAGNOSIS — I50.9 CONGESTIVE HEART FAILURE, UNSPECIFIED HF CHRONICITY, UNSPECIFIED HEART FAILURE TYPE: ICD-10-CM

## 2021-09-17 DIAGNOSIS — I50.9 CHF (CONGESTIVE HEART FAILURE): ICD-10-CM

## 2021-09-17 DIAGNOSIS — I50.9 CHF EXACERBATION: ICD-10-CM

## 2021-09-17 PROBLEM — R50.9 FEVER: Status: ACTIVE | Noted: 2021-09-17

## 2021-09-17 LAB
ALBUMIN SERPL BCP-MCNC: 3.3 G/DL (ref 3.5–5.2)
ALP SERPL-CCNC: 65 U/L (ref 55–135)
ALT SERPL W/O P-5'-P-CCNC: 16 U/L (ref 10–44)
ANION GAP SERPL CALC-SCNC: 10 MMOL/L (ref 8–16)
ANISOCYTOSIS BLD QL SMEAR: ABNORMAL
AORTIC ROOT ANNULUS: 3.28 CM
AST SERPL-CCNC: 41 U/L (ref 10–40)
AV INDEX (PROSTH): 0.9
AV MEAN GRADIENT: 7 MMHG
AV PEAK GRADIENT: 14 MMHG
AV VALVE AREA: 3.32 CM2
AV VELOCITY RATIO: 0.62
BACTERIA #/AREA URNS HPF: ABNORMAL /HPF
BASOPHILS # BLD AUTO: 0.04 K/UL (ref 0–0.2)
BASOPHILS NFR BLD: 0.6 % (ref 0–1.9)
BILIRUB SERPL-MCNC: 2.1 MG/DL (ref 0.1–1)
BILIRUB UR QL STRIP: NEGATIVE
BNP SERPL-MCNC: 2227 PG/ML (ref 0–99)
BSA FOR ECHO PROCEDURE: 2.16 M2
BUN SERPL-MCNC: 27 MG/DL (ref 6–20)
BURR CELLS BLD QL SMEAR: ABNORMAL
CALCIUM SERPL-MCNC: 9.3 MG/DL (ref 8.7–10.5)
CHLORIDE SERPL-SCNC: 109 MMOL/L (ref 95–110)
CK SERPL-CCNC: 47 U/L (ref 20–180)
CLARITY UR: CLEAR
CO2 SERPL-SCNC: 23 MMOL/L (ref 23–29)
COLOR UR: COLORLESS
CREAT SERPL-MCNC: 1.8 MG/DL (ref 0.5–1.4)
CTP QC/QA: YES
CV ECHO LV RWT: 0.63 CM
DACRYOCYTES BLD QL SMEAR: ABNORMAL
DIFFERENTIAL METHOD: ABNORMAL
DOP CALC AO PEAK VEL: 1.84 M/S
DOP CALC AO VTI: 24.41 CM
DOP CALC LVOT AREA: 3.7 CM2
DOP CALC LVOT DIAMETER: 2.17 CM
DOP CALC LVOT PEAK VEL: 1.14 M/S
DOP CALC LVOT STROKE VOLUME: 80.99 CM3
DOP CALC MV VTI: 28.32 CM
DOP CALCLVOT PEAK VEL VTI: 21.91 CM
E WAVE DECELERATION TIME: 169.18 MSEC
E/A RATIO: 1.15
ECHO LV POSTERIOR WALL: 1.96 CM (ref 0.6–1.1)
EJECTION FRACTION: 25 %
EOSINOPHIL # BLD AUTO: 0.2 K/UL (ref 0–0.5)
EOSINOPHIL NFR BLD: 2.4 % (ref 0–8)
ERYTHROCYTE [DISTWIDTH] IN BLOOD BY AUTOMATED COUNT: 27 % (ref 11.5–14.5)
EST. GFR  (AFRICAN AMERICAN): 36 ML/MIN/1.73 M^2
EST. GFR  (NON AFRICAN AMERICAN): 31 ML/MIN/1.73 M^2
ESTIMATED AVG GLUCOSE: 108 MG/DL (ref 68–131)
FRACTIONAL SHORTENING: 11 % (ref 28–44)
GLUCOSE SERPL-MCNC: 113 MG/DL (ref 70–110)
GLUCOSE UR QL STRIP: NEGATIVE
HBA1C MFR BLD: 5.4 % (ref 4–5.6)
HCT VFR BLD AUTO: 29.4 % (ref 37–48.5)
HGB BLD-MCNC: 8.8 G/DL (ref 12–16)
HGB UR QL STRIP: ABNORMAL
HYALINE CASTS #/AREA URNS LPF: 2 /LPF
HYPOCHROMIA BLD QL SMEAR: ABNORMAL
IMM GRANULOCYTES # BLD AUTO: 0.04 K/UL (ref 0–0.04)
IMM GRANULOCYTES NFR BLD AUTO: 0.6 % (ref 0–0.5)
INTERVENTRICULAR SEPTUM: 1.82 CM (ref 0.6–1.1)
KETONES UR QL STRIP: NEGATIVE
LA MAJOR: 7.38 CM
LA MINOR: 6.2 CM
LA WIDTH: 4.57 CM
LEFT ATRIUM SIZE: 4.95 CM
LEFT ATRIUM VOLUME INDEX MOD: 38.9 ML/M2
LEFT ATRIUM VOLUME INDEX: 62 ML/M2
LEFT ATRIUM VOLUME MOD: 81.26 CM3
LEFT ATRIUM VOLUME: 129.57 CM3
LEFT INTERNAL DIMENSION IN SYSTOLE: 5.47 CM (ref 2.1–4)
LEFT VENTRICLE DIASTOLIC VOLUME INDEX: 92.14 ML/M2
LEFT VENTRICLE DIASTOLIC VOLUME: 192.57 ML
LEFT VENTRICLE MASS INDEX: 300 G/M2
LEFT VENTRICLE SYSTOLIC VOLUME INDEX: 69.5 ML/M2
LEFT VENTRICLE SYSTOLIC VOLUME: 145.31 ML
LEFT VENTRICULAR INTERNAL DIMENSION IN DIASTOLE: 6.18 CM (ref 3.5–6)
LEFT VENTRICULAR MASS: 626.28 G
LEUKOCYTE ESTERASE UR QL STRIP: NEGATIVE
LV LATERAL E/E' RATIO: 34 M/S
LYMPHOCYTES # BLD AUTO: 0.9 K/UL (ref 1–4.8)
LYMPHOCYTES NFR BLD: 13.4 % (ref 18–48)
MAGNESIUM SERPL-MCNC: 1.7 MG/DL (ref 1.6–2.6)
MCH RBC QN AUTO: 17.7 PG (ref 27–31)
MCHC RBC AUTO-ENTMCNC: 29.9 G/DL (ref 32–36)
MCV RBC AUTO: 59 FL (ref 82–98)
MICROSCOPIC COMMENT: ABNORMAL
MONOCYTES # BLD AUTO: 0.5 K/UL (ref 0.3–1)
MONOCYTES NFR BLD: 6.6 % (ref 4–15)
MV MEAN GRADIENT: 1 MMHG
MV PEAK A VEL: 1.18 M/S
MV PEAK E VEL: 1.36 M/S
MV PEAK GRADIENT: 8 MMHG
MV STENOSIS PRESSURE HALF TIME: 49.06 MS
MV VALVE AREA BY CONTINUITY EQUATION: 2.86 CM2
MV VALVE AREA P 1/2 METHOD: 4.48 CM2
NEUTROPHILS # BLD AUTO: 5.3 K/UL (ref 1.8–7.7)
NEUTROPHILS NFR BLD: 76.4 % (ref 38–73)
NITRITE UR QL STRIP: NEGATIVE
NRBC BLD-RTO: 1 /100 WBC
OVALOCYTES BLD QL SMEAR: ABNORMAL
PH UR STRIP: 7 [PH] (ref 5–8)
PLATELET # BLD AUTO: 216 K/UL (ref 150–450)
PLATELET BLD QL SMEAR: ABNORMAL
PMV BLD AUTO: ABNORMAL FL (ref 9.2–12.9)
POCT GLUCOSE: 106 MG/DL (ref 70–110)
POCT GLUCOSE: 121 MG/DL (ref 70–110)
POCT GLUCOSE: 153 MG/DL (ref 70–110)
POIKILOCYTOSIS BLD QL SMEAR: ABNORMAL
POTASSIUM SERPL-SCNC: 5.1 MMOL/L (ref 3.5–5.1)
PROT SERPL-MCNC: 6.9 G/DL (ref 6–8.4)
PROT UR QL STRIP: ABNORMAL
RA MAJOR: 5.79 CM
RA PRESSURE: 8 MMHG
RA WIDTH: 2.71 CM
RBC # BLD AUTO: 4.96 M/UL (ref 4–5.4)
RBC #/AREA URNS HPF: 0 /HPF (ref 0–4)
RIGHT VENTRICULAR END-DIASTOLIC DIMENSION: 3.3 CM
RV TISSUE DOPPLER FREE WALL SYSTOLIC VELOCITY 1 (APICAL 4 CHAMBER VIEW): 13.64 CM/S
SARS-COV-2 RDRP RESP QL NAA+PROBE: NEGATIVE
SCHISTOCYTES BLD QL SMEAR: PRESENT
SODIUM SERPL-SCNC: 142 MMOL/L (ref 136–145)
SP GR UR STRIP: 1.01 (ref 1–1.03)
SQUAMOUS #/AREA URNS HPF: 0 /HPF
TARGETS BLD QL SMEAR: ABNORMAL
TDI LATERAL: 0.04 M/S
TRICUSPID ANNULAR PLANE SYSTOLIC EXCURSION: 1.05 CM
TROPONIN I SERPL DL<=0.01 NG/ML-MCNC: 0.94 NG/ML (ref 0–0.03)
TROPONIN I SERPL DL<=0.01 NG/ML-MCNC: 0.99 NG/ML (ref 0–0.03)
URN SPEC COLLECT METH UR: ABNORMAL
UROBILINOGEN UR STRIP-ACNC: NEGATIVE EU/DL
WBC # BLD AUTO: 6.95 K/UL (ref 3.9–12.7)
WBC #/AREA URNS HPF: 1 /HPF (ref 0–5)

## 2021-09-17 PROCEDURE — 25000003 PHARM REV CODE 250: Performed by: HOSPITALIST

## 2021-09-17 PROCEDURE — 63600175 PHARM REV CODE 636 W HCPCS: Performed by: HOSPITALIST

## 2021-09-17 PROCEDURE — 84484 ASSAY OF TROPONIN QUANT: CPT | Performed by: EMERGENCY MEDICINE

## 2021-09-17 PROCEDURE — 63600175 PHARM REV CODE 636 W HCPCS: Performed by: EMERGENCY MEDICINE

## 2021-09-17 PROCEDURE — 99900035 HC TECH TIME PER 15 MIN (STAT)

## 2021-09-17 PROCEDURE — 83735 ASSAY OF MAGNESIUM: CPT | Performed by: HOSPITALIST

## 2021-09-17 PROCEDURE — 93005 ELECTROCARDIOGRAM TRACING: CPT

## 2021-09-17 PROCEDURE — 99292 CRITICAL CARE ADDL 30 MIN: CPT | Mod: 59

## 2021-09-17 PROCEDURE — 83036 HEMOGLOBIN GLYCOSYLATED A1C: CPT | Performed by: HOSPITALIST

## 2021-09-17 PROCEDURE — 83880 ASSAY OF NATRIURETIC PEPTIDE: CPT | Performed by: EMERGENCY MEDICINE

## 2021-09-17 PROCEDURE — 93010 ELECTROCARDIOGRAM REPORT: CPT | Mod: ,,, | Performed by: INTERNAL MEDICINE

## 2021-09-17 PROCEDURE — 94761 N-INVAS EAR/PLS OXIMETRY MLT: CPT

## 2021-09-17 PROCEDURE — 25000003 PHARM REV CODE 250: Performed by: NURSE PRACTITIONER

## 2021-09-17 PROCEDURE — 27000221 HC OXYGEN, UP TO 24 HOURS

## 2021-09-17 PROCEDURE — U0002 COVID-19 LAB TEST NON-CDC: HCPCS | Performed by: EMERGENCY MEDICINE

## 2021-09-17 PROCEDURE — 11000001 HC ACUTE MED/SURG PRIVATE ROOM

## 2021-09-17 PROCEDURE — 82550 ASSAY OF CK (CPK): CPT | Performed by: EMERGENCY MEDICINE

## 2021-09-17 PROCEDURE — 99291 CRITICAL CARE FIRST HOUR: CPT | Mod: 25

## 2021-09-17 PROCEDURE — 93010 EKG 12-LEAD: ICD-10-PCS | Mod: ,,, | Performed by: INTERNAL MEDICINE

## 2021-09-17 PROCEDURE — 82962 GLUCOSE BLOOD TEST: CPT

## 2021-09-17 PROCEDURE — 94640 AIRWAY INHALATION TREATMENT: CPT

## 2021-09-17 PROCEDURE — 96374 THER/PROPH/DIAG INJ IV PUSH: CPT

## 2021-09-17 PROCEDURE — 80053 COMPREHEN METABOLIC PANEL: CPT | Performed by: EMERGENCY MEDICINE

## 2021-09-17 PROCEDURE — 81000 URINALYSIS NONAUTO W/SCOPE: CPT | Performed by: NURSE PRACTITIONER

## 2021-09-17 PROCEDURE — 84484 ASSAY OF TROPONIN QUANT: CPT | Mod: 91 | Performed by: HOSPITALIST

## 2021-09-17 PROCEDURE — C9399 UNCLASSIFIED DRUGS OR BIOLOG: HCPCS | Performed by: HOSPITALIST

## 2021-09-17 PROCEDURE — 25000242 PHARM REV CODE 250 ALT 637 W/ HCPCS: Performed by: EMERGENCY MEDICINE

## 2021-09-17 PROCEDURE — 85025 COMPLETE CBC W/AUTO DIFF WBC: CPT | Performed by: EMERGENCY MEDICINE

## 2021-09-17 RX ORDER — ONDANSETRON 2 MG/ML
4 INJECTION INTRAMUSCULAR; INTRAVENOUS EVERY 8 HOURS PRN
Status: DISCONTINUED | OUTPATIENT
Start: 2021-09-17 | End: 2021-09-22 | Stop reason: HOSPADM

## 2021-09-17 RX ORDER — FUROSEMIDE 10 MG/ML
80 INJECTION INTRAMUSCULAR; INTRAVENOUS
Status: COMPLETED | OUTPATIENT
Start: 2021-09-17 | End: 2021-09-17

## 2021-09-17 RX ORDER — POLYETHYLENE GLYCOL 3350 17 G/17G
17 POWDER, FOR SOLUTION ORAL DAILY
Status: DISCONTINUED | OUTPATIENT
Start: 2021-09-17 | End: 2021-09-22 | Stop reason: HOSPADM

## 2021-09-17 RX ORDER — ATORVASTATIN CALCIUM 40 MG/1
40 TABLET, FILM COATED ORAL DAILY
Status: DISCONTINUED | OUTPATIENT
Start: 2021-09-17 | End: 2021-09-22 | Stop reason: HOSPADM

## 2021-09-17 RX ORDER — ALLOPURINOL 100 MG/1
100 TABLET ORAL DAILY
Status: DISCONTINUED | OUTPATIENT
Start: 2021-09-17 | End: 2021-09-22 | Stop reason: HOSPADM

## 2021-09-17 RX ORDER — ROPINIROLE 1 MG/1
4 TABLET, FILM COATED ORAL NIGHTLY
Status: DISCONTINUED | OUTPATIENT
Start: 2021-09-17 | End: 2021-09-22 | Stop reason: HOSPADM

## 2021-09-17 RX ORDER — PANTOPRAZOLE SODIUM 40 MG/1
TABLET, DELAYED RELEASE ORAL
COMMUNITY
Start: 2021-06-29 | End: 2021-12-28 | Stop reason: SDUPTHER

## 2021-09-17 RX ORDER — FLUTICASONE FUROATE AND VILANTEROL 100; 25 UG/1; UG/1
1 POWDER RESPIRATORY (INHALATION) DAILY
Status: DISCONTINUED | OUTPATIENT
Start: 2021-09-17 | End: 2021-09-22 | Stop reason: HOSPADM

## 2021-09-17 RX ORDER — FUROSEMIDE 10 MG/ML
40 INJECTION INTRAMUSCULAR; INTRAVENOUS
Status: DISCONTINUED | OUTPATIENT
Start: 2021-09-17 | End: 2021-09-18

## 2021-09-17 RX ORDER — GLUCAGON 1 MG
1 KIT INJECTION
Status: DISCONTINUED | OUTPATIENT
Start: 2021-09-17 | End: 2021-09-22 | Stop reason: HOSPADM

## 2021-09-17 RX ORDER — PROMETHAZINE HYDROCHLORIDE AND CODEINE PHOSPHATE 6.25; 1 MG/5ML; MG/5ML
SOLUTION ORAL
Status: ON HOLD | COMMUNITY
Start: 2021-08-13 | End: 2021-11-02 | Stop reason: HOSPADM

## 2021-09-17 RX ORDER — PANTOPRAZOLE SODIUM 40 MG/1
40 TABLET, DELAYED RELEASE ORAL DAILY
Status: DISCONTINUED | OUTPATIENT
Start: 2021-09-18 | End: 2021-09-22 | Stop reason: HOSPADM

## 2021-09-17 RX ORDER — IBUPROFEN 200 MG
16 TABLET ORAL
Status: DISCONTINUED | OUTPATIENT
Start: 2021-09-17 | End: 2021-09-22 | Stop reason: HOSPADM

## 2021-09-17 RX ORDER — METOPROLOL SUCCINATE 50 MG/1
100 TABLET, EXTENDED RELEASE ORAL DAILY
Status: DISCONTINUED | OUTPATIENT
Start: 2021-09-18 | End: 2021-09-20

## 2021-09-17 RX ORDER — BENZONATATE 100 MG/1
100 CAPSULE ORAL 3 TIMES DAILY PRN
Status: DISCONTINUED | OUTPATIENT
Start: 2021-09-17 | End: 2021-09-17

## 2021-09-17 RX ORDER — DULOXETIN HYDROCHLORIDE 30 MG/1
30 CAPSULE, DELAYED RELEASE ORAL DAILY
Status: DISCONTINUED | OUTPATIENT
Start: 2021-09-17 | End: 2021-09-22 | Stop reason: HOSPADM

## 2021-09-17 RX ORDER — INSULIN ASPART 100 [IU]/ML
1-10 INJECTION, SOLUTION INTRAVENOUS; SUBCUTANEOUS
Status: DISCONTINUED | OUTPATIENT
Start: 2021-09-17 | End: 2021-09-22 | Stop reason: HOSPADM

## 2021-09-17 RX ORDER — BUMETANIDE 1 MG/1
1 TABLET ORAL 2 TIMES DAILY
Status: ON HOLD | COMMUNITY
Start: 2021-08-30 | End: 2021-09-22 | Stop reason: HOSPADM

## 2021-09-17 RX ORDER — METOPROLOL SUCCINATE 50 MG/1
50 TABLET, EXTENDED RELEASE ORAL NIGHTLY
Status: DISCONTINUED | OUTPATIENT
Start: 2021-09-17 | End: 2021-09-20

## 2021-09-17 RX ORDER — IBUPROFEN 200 MG
24 TABLET ORAL
Status: DISCONTINUED | OUTPATIENT
Start: 2021-09-17 | End: 2021-09-22 | Stop reason: HOSPADM

## 2021-09-17 RX ORDER — ALBUTEROL SULFATE 2.5 MG/.5ML
15 SOLUTION RESPIRATORY (INHALATION)
Status: COMPLETED | OUTPATIENT
Start: 2021-09-17 | End: 2021-09-17

## 2021-09-17 RX ORDER — IPRATROPIUM BROMIDE AND ALBUTEROL SULFATE 2.5; .5 MG/3ML; MG/3ML
1 SOLUTION RESPIRATORY (INHALATION) EVERY 6 HOURS PRN
Status: DISCONTINUED | OUTPATIENT
Start: 2021-09-17 | End: 2021-09-18

## 2021-09-17 RX ORDER — SODIUM CHLORIDE 0.9 % (FLUSH) 0.9 %
10 SYRINGE (ML) INJECTION
Status: DISCONTINUED | OUTPATIENT
Start: 2021-09-17 | End: 2021-09-22 | Stop reason: HOSPADM

## 2021-09-17 RX ADMIN — FUROSEMIDE 40 MG: 10 INJECTION, SOLUTION INTRAVENOUS at 03:09

## 2021-09-17 RX ADMIN — INSULIN DETEMIR 10 UNITS: 100 INJECTION, SOLUTION SUBCUTANEOUS at 08:09

## 2021-09-17 RX ADMIN — ALLOPURINOL 100 MG: 100 TABLET ORAL at 03:09

## 2021-09-17 RX ADMIN — METOPROLOL SUCCINATE 50 MG: 50 TABLET, EXTENDED RELEASE ORAL at 08:09

## 2021-09-17 RX ADMIN — ROPINIROLE HYDROCHLORIDE 4 MG: 1 TABLET, FILM COATED ORAL at 10:09

## 2021-09-17 RX ADMIN — APIXABAN 5 MG: 5 TABLET, FILM COATED ORAL at 08:09

## 2021-09-17 RX ADMIN — FUROSEMIDE 80 MG: 10 INJECTION, SOLUTION INTRAMUSCULAR; INTRAVENOUS at 01:09

## 2021-09-17 RX ADMIN — POLYETHYLENE GLYCOL (3350) 17 G: 17 POWDER, FOR SOLUTION ORAL at 03:09

## 2021-09-17 RX ADMIN — ALBUTEROL SULFATE 15 MG: 2.5 SOLUTION RESPIRATORY (INHALATION) at 01:09

## 2021-09-17 RX ADMIN — SACUBITRIL AND VALSARTAN 1 TABLET: 97; 103 TABLET, FILM COATED ORAL at 08:09

## 2021-09-17 RX ADMIN — ATORVASTATIN CALCIUM 40 MG: 40 TABLET, FILM COATED ORAL at 03:09

## 2021-09-18 LAB
ANION GAP SERPL CALC-SCNC: 9 MMOL/L (ref 8–16)
ANISOCYTOSIS BLD QL SMEAR: ABNORMAL
BASO STIPL BLD QL SMEAR: ABNORMAL
BASOPHILS # BLD AUTO: 0.03 K/UL (ref 0–0.2)
BASOPHILS NFR BLD: 0.5 % (ref 0–1.9)
BUN SERPL-MCNC: 28 MG/DL (ref 6–20)
BURR CELLS BLD QL SMEAR: ABNORMAL
CALCIUM SERPL-MCNC: 9.4 MG/DL (ref 8.7–10.5)
CHLORIDE SERPL-SCNC: 104 MMOL/L (ref 95–110)
CO2 SERPL-SCNC: 28 MMOL/L (ref 23–29)
CREAT SERPL-MCNC: 1.8 MG/DL (ref 0.5–1.4)
DACRYOCYTES BLD QL SMEAR: ABNORMAL
DIFFERENTIAL METHOD: ABNORMAL
EOSINOPHIL # BLD AUTO: 0.2 K/UL (ref 0–0.5)
EOSINOPHIL NFR BLD: 3.5 % (ref 0–8)
ERYTHROCYTE [DISTWIDTH] IN BLOOD BY AUTOMATED COUNT: 26.1 % (ref 11.5–14.5)
EST. GFR  (AFRICAN AMERICAN): 36 ML/MIN/1.73 M^2
EST. GFR  (NON AFRICAN AMERICAN): 31 ML/MIN/1.73 M^2
GLUCOSE SERPL-MCNC: 119 MG/DL (ref 70–110)
HCT VFR BLD AUTO: 28.3 % (ref 37–48.5)
HGB BLD-MCNC: 8.5 G/DL (ref 12–16)
HYPOCHROMIA BLD QL SMEAR: ABNORMAL
IMM GRANULOCYTES # BLD AUTO: 0.03 K/UL (ref 0–0.04)
IMM GRANULOCYTES NFR BLD AUTO: 0.5 % (ref 0–0.5)
LYMPHOCYTES # BLD AUTO: 1 K/UL (ref 1–4.8)
LYMPHOCYTES NFR BLD: 15.7 % (ref 18–48)
MAGNESIUM SERPL-MCNC: 1.6 MG/DL (ref 1.6–2.6)
MCH RBC QN AUTO: 17.5 PG (ref 27–31)
MCHC RBC AUTO-ENTMCNC: 30 G/DL (ref 32–36)
MCV RBC AUTO: 58 FL (ref 82–98)
MONOCYTES # BLD AUTO: 0.4 K/UL (ref 0.3–1)
MONOCYTES NFR BLD: 6.1 % (ref 4–15)
NEUTROPHILS # BLD AUTO: 4.8 K/UL (ref 1.8–7.7)
NEUTROPHILS NFR BLD: 73.7 % (ref 38–73)
NRBC BLD-RTO: 1 /100 WBC
OVALOCYTES BLD QL SMEAR: ABNORMAL
PLATELET # BLD AUTO: 184 K/UL (ref 150–450)
PMV BLD AUTO: ABNORMAL FL (ref 9.2–12.9)
POCT GLUCOSE: 118 MG/DL (ref 70–110)
POCT GLUCOSE: 130 MG/DL (ref 70–110)
POCT GLUCOSE: 135 MG/DL (ref 70–110)
POIKILOCYTOSIS BLD QL SMEAR: ABNORMAL
POLYCHROMASIA BLD QL SMEAR: ABNORMAL
POTASSIUM SERPL-SCNC: 3.7 MMOL/L (ref 3.5–5.1)
RBC # BLD AUTO: 4.86 M/UL (ref 4–5.4)
SCHISTOCYTES BLD QL SMEAR: PRESENT
SODIUM SERPL-SCNC: 141 MMOL/L (ref 136–145)
TARGETS BLD QL SMEAR: ABNORMAL
TROPONIN I SERPL DL<=0.01 NG/ML-MCNC: 0.87 NG/ML (ref 0–0.03)
TROPONIN I SERPL DL<=0.01 NG/ML-MCNC: 0.95 NG/ML (ref 0–0.03)
WBC # BLD AUTO: 6.56 K/UL (ref 3.9–12.7)

## 2021-09-18 PROCEDURE — 84484 ASSAY OF TROPONIN QUANT: CPT | Mod: 91 | Performed by: HOSPITALIST

## 2021-09-18 PROCEDURE — 84484 ASSAY OF TROPONIN QUANT: CPT | Performed by: HOSPITALIST

## 2021-09-18 PROCEDURE — 99223 PR INITIAL HOSPITAL CARE,LEVL III: ICD-10-PCS | Mod: ,,, | Performed by: INTERNAL MEDICINE

## 2021-09-18 PROCEDURE — 27000221 HC OXYGEN, UP TO 24 HOURS

## 2021-09-18 PROCEDURE — 94640 AIRWAY INHALATION TREATMENT: CPT

## 2021-09-18 PROCEDURE — 94761 N-INVAS EAR/PLS OXIMETRY MLT: CPT

## 2021-09-18 PROCEDURE — 25000003 PHARM REV CODE 250: Performed by: HOSPITALIST

## 2021-09-18 PROCEDURE — 63600175 PHARM REV CODE 636 W HCPCS: Performed by: INTERNAL MEDICINE

## 2021-09-18 PROCEDURE — 85025 COMPLETE CBC W/AUTO DIFF WBC: CPT | Performed by: NURSE PRACTITIONER

## 2021-09-18 PROCEDURE — 80048 BASIC METABOLIC PNL TOTAL CA: CPT | Performed by: HOSPITALIST

## 2021-09-18 PROCEDURE — 83735 ASSAY OF MAGNESIUM: CPT | Performed by: NURSE PRACTITIONER

## 2021-09-18 PROCEDURE — 99223 1ST HOSP IP/OBS HIGH 75: CPT | Mod: ,,, | Performed by: INTERNAL MEDICINE

## 2021-09-18 PROCEDURE — C9399 UNCLASSIFIED DRUGS OR BIOLOG: HCPCS | Performed by: HOSPITALIST

## 2021-09-18 PROCEDURE — 11000001 HC ACUTE MED/SURG PRIVATE ROOM

## 2021-09-18 PROCEDURE — 25000003 PHARM REV CODE 250: Performed by: NURSE PRACTITIONER

## 2021-09-18 PROCEDURE — 63600175 PHARM REV CODE 636 W HCPCS: Performed by: HOSPITALIST

## 2021-09-18 PROCEDURE — 36415 COLL VENOUS BLD VENIPUNCTURE: CPT | Performed by: NURSE PRACTITIONER

## 2021-09-18 PROCEDURE — 25000242 PHARM REV CODE 250 ALT 637 W/ HCPCS: Performed by: HOSPITALIST

## 2021-09-18 PROCEDURE — 36415 COLL VENOUS BLD VENIPUNCTURE: CPT | Performed by: HOSPITALIST

## 2021-09-18 RX ORDER — IPRATROPIUM BROMIDE AND ALBUTEROL SULFATE 2.5; .5 MG/3ML; MG/3ML
3 SOLUTION RESPIRATORY (INHALATION) EVERY 6 HOURS PRN
Status: DISCONTINUED | OUTPATIENT
Start: 2021-09-18 | End: 2021-09-22 | Stop reason: HOSPADM

## 2021-09-18 RX ORDER — FUROSEMIDE 10 MG/ML
80 INJECTION INTRAMUSCULAR; INTRAVENOUS
Status: DISCONTINUED | OUTPATIENT
Start: 2021-09-18 | End: 2021-09-19

## 2021-09-18 RX ADMIN — IPRATROPIUM BROMIDE AND ALBUTEROL SULFATE 3 ML: .5; 3 SOLUTION RESPIRATORY (INHALATION) at 05:09

## 2021-09-18 RX ADMIN — GUAIFENESIN AND DEXTROMETHORPHAN HYDROBROMIDE 1 TABLET: 600; 30 TABLET, EXTENDED RELEASE ORAL at 08:09

## 2021-09-18 RX ADMIN — ONDANSETRON 4 MG: 2 INJECTION INTRAMUSCULAR; INTRAVENOUS at 08:09

## 2021-09-18 RX ADMIN — APIXABAN 5 MG: 5 TABLET, FILM COATED ORAL at 09:09

## 2021-09-18 RX ADMIN — FUROSEMIDE 40 MG: 10 INJECTION, SOLUTION INTRAVENOUS at 04:09

## 2021-09-18 RX ADMIN — ROPINIROLE HYDROCHLORIDE 4 MG: 1 TABLET, FILM COATED ORAL at 08:09

## 2021-09-18 RX ADMIN — METOPROLOL SUCCINATE 100 MG: 50 TABLET, EXTENDED RELEASE ORAL at 08:09

## 2021-09-18 RX ADMIN — ATORVASTATIN CALCIUM 40 MG: 40 TABLET, FILM COATED ORAL at 08:09

## 2021-09-18 RX ADMIN — DULOXETINE 30 MG: 30 CAPSULE, DELAYED RELEASE ORAL at 08:09

## 2021-09-18 RX ADMIN — PANTOPRAZOLE SODIUM 40 MG: 40 TABLET, DELAYED RELEASE ORAL at 08:09

## 2021-09-18 RX ADMIN — FLUTICASONE FUROATE AND VILANTEROL TRIFENATATE 1 PUFF: 100; 25 POWDER RESPIRATORY (INHALATION) at 08:09

## 2021-09-18 RX ADMIN — METOPROLOL SUCCINATE 50 MG: 50 TABLET, EXTENDED RELEASE ORAL at 08:09

## 2021-09-18 RX ADMIN — FUROSEMIDE 80 MG: 10 INJECTION, SOLUTION INTRAVENOUS at 05:09

## 2021-09-18 RX ADMIN — SACUBITRIL AND VALSARTAN 1 TABLET: 97; 103 TABLET, FILM COATED ORAL at 08:09

## 2021-09-18 RX ADMIN — INSULIN DETEMIR 10 UNITS: 100 INJECTION, SOLUTION SUBCUTANEOUS at 08:09

## 2021-09-18 RX ADMIN — GUAIFENESIN AND DEXTROMETHORPHAN HYDROBROMIDE 1 TABLET: 600; 30 TABLET, EXTENDED RELEASE ORAL at 12:09

## 2021-09-18 RX ADMIN — APIXABAN 5 MG: 5 TABLET, FILM COATED ORAL at 08:09

## 2021-09-18 RX ADMIN — SACUBITRIL AND VALSARTAN 1 TABLET: 97; 103 TABLET, FILM COATED ORAL at 09:09

## 2021-09-19 LAB
ANION GAP SERPL CALC-SCNC: 13 MMOL/L (ref 8–16)
ANISOCYTOSIS BLD QL SMEAR: ABNORMAL
BASOPHILS # BLD AUTO: 0.05 K/UL (ref 0–0.2)
BASOPHILS NFR BLD: 0.9 % (ref 0–1.9)
BUN SERPL-MCNC: 33 MG/DL (ref 6–20)
BURR CELLS BLD QL SMEAR: ABNORMAL
CALCIUM SERPL-MCNC: 9.1 MG/DL (ref 8.7–10.5)
CHLORIDE SERPL-SCNC: 104 MMOL/L (ref 95–110)
CO2 SERPL-SCNC: 22 MMOL/L (ref 23–29)
CREAT SERPL-MCNC: 1.8 MG/DL (ref 0.5–1.4)
DACRYOCYTES BLD QL SMEAR: ABNORMAL
DIFFERENTIAL METHOD: ABNORMAL
EOSINOPHIL # BLD AUTO: 0.3 K/UL (ref 0–0.5)
EOSINOPHIL NFR BLD: 5.4 % (ref 0–8)
ERYTHROCYTE [DISTWIDTH] IN BLOOD BY AUTOMATED COUNT: 26.7 % (ref 11.5–14.5)
EST. GFR  (AFRICAN AMERICAN): 36 ML/MIN/1.73 M^2
EST. GFR  (NON AFRICAN AMERICAN): 31 ML/MIN/1.73 M^2
GLUCOSE SERPL-MCNC: 137 MG/DL (ref 70–110)
HCT VFR BLD AUTO: 31.2 % (ref 37–48.5)
HGB BLD-MCNC: 9.4 G/DL (ref 12–16)
HYPOCHROMIA BLD QL SMEAR: ABNORMAL
IMM GRANULOCYTES # BLD AUTO: 0.03 K/UL (ref 0–0.04)
IMM GRANULOCYTES NFR BLD AUTO: 0.5 % (ref 0–0.5)
LYMPHOCYTES # BLD AUTO: 1.1 K/UL (ref 1–4.8)
LYMPHOCYTES NFR BLD: 18.5 % (ref 18–48)
MAGNESIUM SERPL-MCNC: 1.7 MG/DL (ref 1.6–2.6)
MCH RBC QN AUTO: 17.8 PG (ref 27–31)
MCHC RBC AUTO-ENTMCNC: 30.1 G/DL (ref 32–36)
MCV RBC AUTO: 59 FL (ref 82–98)
MONOCYTES # BLD AUTO: 0.4 K/UL (ref 0.3–1)
MONOCYTES NFR BLD: 6.7 % (ref 4–15)
NEUTROPHILS # BLD AUTO: 3.9 K/UL (ref 1.8–7.7)
NEUTROPHILS NFR BLD: 68 % (ref 38–73)
NRBC BLD-RTO: 2 /100 WBC
OVALOCYTES BLD QL SMEAR: ABNORMAL
PLATELET # BLD AUTO: 210 K/UL (ref 150–450)
PMV BLD AUTO: ABNORMAL FL (ref 9.2–12.9)
POCT GLUCOSE: 125 MG/DL (ref 70–110)
POCT GLUCOSE: 141 MG/DL (ref 70–110)
POCT GLUCOSE: 156 MG/DL (ref 70–110)
POCT GLUCOSE: 96 MG/DL (ref 70–110)
POIKILOCYTOSIS BLD QL SMEAR: ABNORMAL
POLYCHROMASIA BLD QL SMEAR: ABNORMAL
POTASSIUM SERPL-SCNC: 4.3 MMOL/L (ref 3.5–5.1)
RBC # BLD AUTO: 5.28 M/UL (ref 4–5.4)
SCHISTOCYTES BLD QL SMEAR: PRESENT
SODIUM SERPL-SCNC: 139 MMOL/L (ref 136–145)
SPHEROCYTES BLD QL SMEAR: ABNORMAL
TARGETS BLD QL SMEAR: ABNORMAL
WBC # BLD AUTO: 5.78 K/UL (ref 3.9–12.7)

## 2021-09-19 PROCEDURE — 94761 N-INVAS EAR/PLS OXIMETRY MLT: CPT

## 2021-09-19 PROCEDURE — 25000003 PHARM REV CODE 250: Performed by: NURSE PRACTITIONER

## 2021-09-19 PROCEDURE — 11000001 HC ACUTE MED/SURG PRIVATE ROOM

## 2021-09-19 PROCEDURE — 80048 BASIC METABOLIC PNL TOTAL CA: CPT | Performed by: HOSPITALIST

## 2021-09-19 PROCEDURE — 83735 ASSAY OF MAGNESIUM: CPT | Performed by: NURSE PRACTITIONER

## 2021-09-19 PROCEDURE — 99233 PR SUBSEQUENT HOSPITAL CARE,LEVL III: ICD-10-PCS | Mod: ,,, | Performed by: INTERNAL MEDICINE

## 2021-09-19 PROCEDURE — 85025 COMPLETE CBC W/AUTO DIFF WBC: CPT | Performed by: NURSE PRACTITIONER

## 2021-09-19 PROCEDURE — 25000003 PHARM REV CODE 250: Performed by: HOSPITALIST

## 2021-09-19 PROCEDURE — C9399 UNCLASSIFIED DRUGS OR BIOLOG: HCPCS | Performed by: HOSPITALIST

## 2021-09-19 PROCEDURE — 63600175 PHARM REV CODE 636 W HCPCS: Performed by: INTERNAL MEDICINE

## 2021-09-19 PROCEDURE — 63600175 PHARM REV CODE 636 W HCPCS: Performed by: HOSPITALIST

## 2021-09-19 PROCEDURE — 27100171 HC OXYGEN HIGH FLOW UP TO 24 HOURS

## 2021-09-19 PROCEDURE — 94640 AIRWAY INHALATION TREATMENT: CPT

## 2021-09-19 PROCEDURE — 25000242 PHARM REV CODE 250 ALT 637 W/ HCPCS: Performed by: HOSPITALIST

## 2021-09-19 PROCEDURE — 99233 SBSQ HOSP IP/OBS HIGH 50: CPT | Mod: ,,, | Performed by: INTERNAL MEDICINE

## 2021-09-19 PROCEDURE — 36415 COLL VENOUS BLD VENIPUNCTURE: CPT | Performed by: NURSE PRACTITIONER

## 2021-09-19 PROCEDURE — 27000221 HC OXYGEN, UP TO 24 HOURS

## 2021-09-19 RX ORDER — FUROSEMIDE 10 MG/ML
40 INJECTION INTRAMUSCULAR; INTRAVENOUS
Status: DISCONTINUED | OUTPATIENT
Start: 2021-09-19 | End: 2021-09-20

## 2021-09-19 RX ADMIN — TIOTROPIUM BROMIDE INHALATION SPRAY 2 PUFF: 3.12 SPRAY, METERED RESPIRATORY (INHALATION) at 08:09

## 2021-09-19 RX ADMIN — METOPROLOL SUCCINATE 100 MG: 50 TABLET, EXTENDED RELEASE ORAL at 08:09

## 2021-09-19 RX ADMIN — METOPROLOL SUCCINATE 50 MG: 50 TABLET, EXTENDED RELEASE ORAL at 09:09

## 2021-09-19 RX ADMIN — DULOXETINE 30 MG: 30 CAPSULE, DELAYED RELEASE ORAL at 08:09

## 2021-09-19 RX ADMIN — FUROSEMIDE 80 MG: 10 INJECTION, SOLUTION INTRAVENOUS at 02:09

## 2021-09-19 RX ADMIN — APIXABAN 5 MG: 5 TABLET, FILM COATED ORAL at 09:09

## 2021-09-19 RX ADMIN — ATORVASTATIN CALCIUM 40 MG: 40 TABLET, FILM COATED ORAL at 08:09

## 2021-09-19 RX ADMIN — SACUBITRIL AND VALSARTAN 1 TABLET: 97; 103 TABLET, FILM COATED ORAL at 09:09

## 2021-09-19 RX ADMIN — POLYETHYLENE GLYCOL (3350) 17 G: 17 POWDER, FOR SOLUTION ORAL at 08:09

## 2021-09-19 RX ADMIN — GUAIFENESIN AND DEXTROMETHORPHAN HYDROBROMIDE 1 TABLET: 600; 30 TABLET, EXTENDED RELEASE ORAL at 09:09

## 2021-09-19 RX ADMIN — APIXABAN 5 MG: 5 TABLET, FILM COATED ORAL at 08:09

## 2021-09-19 RX ADMIN — ROPINIROLE HYDROCHLORIDE 4 MG: 1 TABLET, FILM COATED ORAL at 09:09

## 2021-09-19 RX ADMIN — GUAIFENESIN AND DEXTROMETHORPHAN HYDROBROMIDE 1 TABLET: 600; 30 TABLET, EXTENDED RELEASE ORAL at 08:09

## 2021-09-19 RX ADMIN — ONDANSETRON 4 MG: 2 INJECTION INTRAMUSCULAR; INTRAVENOUS at 08:09

## 2021-09-19 RX ADMIN — SACUBITRIL AND VALSARTAN 1 TABLET: 97; 103 TABLET, FILM COATED ORAL at 08:09

## 2021-09-19 RX ADMIN — FUROSEMIDE 40 MG: 10 INJECTION, SOLUTION INTRAVENOUS at 05:09

## 2021-09-19 RX ADMIN — FLUTICASONE FUROATE AND VILANTEROL TRIFENATATE 1 PUFF: 100; 25 POWDER RESPIRATORY (INHALATION) at 08:09

## 2021-09-19 RX ADMIN — PANTOPRAZOLE SODIUM 40 MG: 40 TABLET, DELAYED RELEASE ORAL at 08:09

## 2021-09-20 LAB
ANION GAP SERPL CALC-SCNC: 12 MMOL/L (ref 8–16)
ANISOCYTOSIS BLD QL SMEAR: ABNORMAL
BASOPHILS # BLD AUTO: 0.04 K/UL (ref 0–0.2)
BASOPHILS NFR BLD: 0.7 % (ref 0–1.9)
BUN SERPL-MCNC: 40 MG/DL (ref 6–20)
BURR CELLS BLD QL SMEAR: ABNORMAL
CALCIUM SERPL-MCNC: 9 MG/DL (ref 8.7–10.5)
CHLORIDE SERPL-SCNC: 103 MMOL/L (ref 95–110)
CO2 SERPL-SCNC: 22 MMOL/L (ref 23–29)
CREAT SERPL-MCNC: 2.2 MG/DL (ref 0.5–1.4)
DACRYOCYTES BLD QL SMEAR: ABNORMAL
DIFFERENTIAL METHOD: ABNORMAL
EOSINOPHIL # BLD AUTO: 0.3 K/UL (ref 0–0.5)
EOSINOPHIL NFR BLD: 5.5 % (ref 0–8)
ERYTHROCYTE [DISTWIDTH] IN BLOOD BY AUTOMATED COUNT: 26.9 % (ref 11.5–14.5)
EST. GFR  (AFRICAN AMERICAN): 28 ML/MIN/1.73 M^2
EST. GFR  (NON AFRICAN AMERICAN): 24 ML/MIN/1.73 M^2
GLUCOSE SERPL-MCNC: 112 MG/DL (ref 70–110)
HCT VFR BLD AUTO: 31 % (ref 37–48.5)
HGB BLD-MCNC: 9.2 G/DL (ref 12–16)
HYPOCHROMIA BLD QL SMEAR: ABNORMAL
IMM GRANULOCYTES # BLD AUTO: 0.06 K/UL (ref 0–0.04)
IMM GRANULOCYTES NFR BLD AUTO: 1.1 % (ref 0–0.5)
LYMPHOCYTES # BLD AUTO: 1.3 K/UL (ref 1–4.8)
LYMPHOCYTES NFR BLD: 23.3 % (ref 18–48)
MAGNESIUM SERPL-MCNC: 1.6 MG/DL (ref 1.6–2.6)
MCH RBC QN AUTO: 17.7 PG (ref 27–31)
MCHC RBC AUTO-ENTMCNC: 29.7 G/DL (ref 32–36)
MCV RBC AUTO: 60 FL (ref 82–98)
MONOCYTES # BLD AUTO: 0.4 K/UL (ref 0.3–1)
MONOCYTES NFR BLD: 7.3 % (ref 4–15)
NEUTROPHILS # BLD AUTO: 3.4 K/UL (ref 1.8–7.7)
NEUTROPHILS NFR BLD: 62.1 % (ref 38–73)
NRBC BLD-RTO: 4 /100 WBC
OVALOCYTES BLD QL SMEAR: ABNORMAL
PLATELET # BLD AUTO: 206 K/UL (ref 150–450)
PLATELET BLD QL SMEAR: ABNORMAL
PMV BLD AUTO: ABNORMAL FL (ref 9.2–12.9)
POCT GLUCOSE: 122 MG/DL (ref 70–110)
POCT GLUCOSE: 126 MG/DL (ref 70–110)
POCT GLUCOSE: 132 MG/DL (ref 70–110)
POCT GLUCOSE: 144 MG/DL (ref 70–110)
POIKILOCYTOSIS BLD QL SMEAR: ABNORMAL
POLYCHROMASIA BLD QL SMEAR: ABNORMAL
POTASSIUM SERPL-SCNC: 4.2 MMOL/L (ref 3.5–5.1)
RBC # BLD AUTO: 5.19 M/UL (ref 4–5.4)
SCHISTOCYTES BLD QL SMEAR: PRESENT
SODIUM SERPL-SCNC: 137 MMOL/L (ref 136–145)
SPHEROCYTES BLD QL SMEAR: ABNORMAL
TARGETS BLD QL SMEAR: ABNORMAL
WBC # BLD AUTO: 5.45 K/UL (ref 3.9–12.7)

## 2021-09-20 PROCEDURE — 27100171 HC OXYGEN HIGH FLOW UP TO 24 HOURS

## 2021-09-20 PROCEDURE — 25000003 PHARM REV CODE 250: Performed by: PSYCHIATRY & NEUROLOGY

## 2021-09-20 PROCEDURE — 25000003 PHARM REV CODE 250: Performed by: NURSE PRACTITIONER

## 2021-09-20 PROCEDURE — 99233 PR SUBSEQUENT HOSPITAL CARE,LEVL III: ICD-10-PCS | Mod: ,,, | Performed by: INTERNAL MEDICINE

## 2021-09-20 PROCEDURE — 90833 PR PSYCHOTHERAPY W/PATIENT W/E&M, 30 MIN (ADD ON): ICD-10-PCS | Mod: AF,HB,, | Performed by: PSYCHIATRY & NEUROLOGY

## 2021-09-20 PROCEDURE — 83735 ASSAY OF MAGNESIUM: CPT | Performed by: NURSE PRACTITIONER

## 2021-09-20 PROCEDURE — 94761 N-INVAS EAR/PLS OXIMETRY MLT: CPT

## 2021-09-20 PROCEDURE — 25000242 PHARM REV CODE 250 ALT 637 W/ HCPCS: Performed by: HOSPITALIST

## 2021-09-20 PROCEDURE — 97161 PT EVAL LOW COMPLEX 20 MIN: CPT

## 2021-09-20 PROCEDURE — 85025 COMPLETE CBC W/AUTO DIFF WBC: CPT | Performed by: NURSE PRACTITIONER

## 2021-09-20 PROCEDURE — 94640 AIRWAY INHALATION TREATMENT: CPT

## 2021-09-20 PROCEDURE — 11000001 HC ACUTE MED/SURG PRIVATE ROOM

## 2021-09-20 PROCEDURE — 99223 PR INITIAL HOSPITAL CARE,LEVL III: ICD-10-PCS | Mod: AF,HB,, | Performed by: PSYCHIATRY & NEUROLOGY

## 2021-09-20 PROCEDURE — 27000221 HC OXYGEN, UP TO 24 HOURS

## 2021-09-20 PROCEDURE — 80048 BASIC METABOLIC PNL TOTAL CA: CPT | Performed by: HOSPITALIST

## 2021-09-20 PROCEDURE — 97116 GAIT TRAINING THERAPY: CPT

## 2021-09-20 PROCEDURE — 25000003 PHARM REV CODE 250: Performed by: INTERNAL MEDICINE

## 2021-09-20 PROCEDURE — 99233 SBSQ HOSP IP/OBS HIGH 50: CPT | Mod: ,,, | Performed by: INTERNAL MEDICINE

## 2021-09-20 PROCEDURE — 63600175 PHARM REV CODE 636 W HCPCS: Performed by: HOSPITALIST

## 2021-09-20 PROCEDURE — 97165 OT EVAL LOW COMPLEX 30 MIN: CPT

## 2021-09-20 PROCEDURE — 63600175 PHARM REV CODE 636 W HCPCS: Performed by: INTERNAL MEDICINE

## 2021-09-20 PROCEDURE — 25000003 PHARM REV CODE 250: Performed by: HOSPITALIST

## 2021-09-20 PROCEDURE — 99223 1ST HOSP IP/OBS HIGH 75: CPT | Mod: AF,HB,, | Performed by: PSYCHIATRY & NEUROLOGY

## 2021-09-20 PROCEDURE — 36415 COLL VENOUS BLD VENIPUNCTURE: CPT | Performed by: NURSE PRACTITIONER

## 2021-09-20 PROCEDURE — 90833 PSYTX W PT W E/M 30 MIN: CPT | Mod: AF,HB,, | Performed by: PSYCHIATRY & NEUROLOGY

## 2021-09-20 RX ORDER — FUROSEMIDE 10 MG/ML
40 INJECTION INTRAMUSCULAR; INTRAVENOUS DAILY
Status: DISCONTINUED | OUTPATIENT
Start: 2021-09-21 | End: 2021-09-21

## 2021-09-20 RX ORDER — METOPROLOL SUCCINATE 50 MG/1
100 TABLET, EXTENDED RELEASE ORAL 2 TIMES DAILY
Status: DISCONTINUED | OUTPATIENT
Start: 2021-09-20 | End: 2021-09-22 | Stop reason: HOSPADM

## 2021-09-20 RX ORDER — SERTRALINE HYDROCHLORIDE 25 MG/1
25 TABLET, FILM COATED ORAL DAILY
Status: DISCONTINUED | OUTPATIENT
Start: 2021-09-20 | End: 2021-09-22 | Stop reason: HOSPADM

## 2021-09-20 RX ORDER — GUAIFENESIN/DEXTROMETHORPHAN 100-10MG/5
5 SYRUP ORAL EVERY 6 HOURS PRN
Status: DISCONTINUED | OUTPATIENT
Start: 2021-09-20 | End: 2021-09-22 | Stop reason: HOSPADM

## 2021-09-20 RX ORDER — BENZONATATE 100 MG/1
100 CAPSULE ORAL 3 TIMES DAILY PRN
Status: DISCONTINUED | OUTPATIENT
Start: 2021-09-20 | End: 2021-09-20

## 2021-09-20 RX ORDER — MAGNESIUM SULFATE HEPTAHYDRATE 40 MG/ML
2 INJECTION, SOLUTION INTRAVENOUS ONCE
Status: COMPLETED | OUTPATIENT
Start: 2021-09-20 | End: 2021-09-20

## 2021-09-20 RX ADMIN — METOPROLOL SUCCINATE 100 MG: 50 TABLET, EXTENDED RELEASE ORAL at 09:09

## 2021-09-20 RX ADMIN — TIOTROPIUM BROMIDE INHALATION SPRAY 2 PUFF: 3.12 SPRAY, METERED RESPIRATORY (INHALATION) at 08:09

## 2021-09-20 RX ADMIN — APIXABAN 5 MG: 5 TABLET, FILM COATED ORAL at 10:09

## 2021-09-20 RX ADMIN — MAGNESIUM SULFATE IN WATER 2 G: 40 INJECTION, SOLUTION INTRAVENOUS at 10:09

## 2021-09-20 RX ADMIN — ROPINIROLE HYDROCHLORIDE 4 MG: 1 TABLET, FILM COATED ORAL at 09:09

## 2021-09-20 RX ADMIN — SERTRALINE HYDROCHLORIDE 25 MG: 25 TABLET ORAL at 01:09

## 2021-09-20 RX ADMIN — GUAIFENESIN AND DEXTROMETHORPHAN HYDROBROMIDE 1 TABLET: 600; 30 TABLET, EXTENDED RELEASE ORAL at 10:09

## 2021-09-20 RX ADMIN — POLYETHYLENE GLYCOL (3350) 17 G: 17 POWDER, FOR SOLUTION ORAL at 10:09

## 2021-09-20 RX ADMIN — GUAIFENESIN AND DEXTROMETHORPHAN HYDROBROMIDE 1 TABLET: 600; 30 TABLET, EXTENDED RELEASE ORAL at 09:09

## 2021-09-20 RX ADMIN — FUROSEMIDE 40 MG: 10 INJECTION, SOLUTION INTRAVENOUS at 05:09

## 2021-09-20 RX ADMIN — DULOXETINE 30 MG: 30 CAPSULE, DELAYED RELEASE ORAL at 10:09

## 2021-09-20 RX ADMIN — FLUTICASONE FUROATE AND VILANTEROL TRIFENATATE 1 PUFF: 100; 25 POWDER RESPIRATORY (INHALATION) at 08:09

## 2021-09-20 RX ADMIN — PANTOPRAZOLE SODIUM 40 MG: 40 TABLET, DELAYED RELEASE ORAL at 10:09

## 2021-09-20 RX ADMIN — APIXABAN 5 MG: 5 TABLET, FILM COATED ORAL at 09:09

## 2021-09-20 RX ADMIN — ATORVASTATIN CALCIUM 40 MG: 40 TABLET, FILM COATED ORAL at 10:09

## 2021-09-20 RX ADMIN — SACUBITRIL AND VALSARTAN 1 TABLET: 97; 103 TABLET, FILM COATED ORAL at 09:09

## 2021-09-20 RX ADMIN — SACUBITRIL AND VALSARTAN 1 TABLET: 97; 103 TABLET, FILM COATED ORAL at 10:09

## 2021-09-21 LAB
ANION GAP SERPL CALC-SCNC: 11 MMOL/L (ref 8–16)
ANISOCYTOSIS BLD QL SMEAR: ABNORMAL
BASOPHILS # BLD AUTO: 0.04 K/UL (ref 0–0.2)
BASOPHILS NFR BLD: 0.8 % (ref 0–1.9)
BUN SERPL-MCNC: 40 MG/DL (ref 6–20)
BURR CELLS BLD QL SMEAR: ABNORMAL
CALCIUM SERPL-MCNC: 9.1 MG/DL (ref 8.7–10.5)
CHLORIDE SERPL-SCNC: 104 MMOL/L (ref 95–110)
CO2 SERPL-SCNC: 23 MMOL/L (ref 23–29)
CREAT SERPL-MCNC: 1.9 MG/DL (ref 0.5–1.4)
DACRYOCYTES BLD QL SMEAR: ABNORMAL
DIFFERENTIAL METHOD: ABNORMAL
EOSINOPHIL # BLD AUTO: 0.3 K/UL (ref 0–0.5)
EOSINOPHIL NFR BLD: 5.2 % (ref 0–8)
ERYTHROCYTE [DISTWIDTH] IN BLOOD BY AUTOMATED COUNT: 26.7 % (ref 11.5–14.5)
EST. GFR  (AFRICAN AMERICAN): 33 ML/MIN/1.73 M^2
EST. GFR  (NON AFRICAN AMERICAN): 29 ML/MIN/1.73 M^2
GLUCOSE SERPL-MCNC: 99 MG/DL (ref 70–110)
HCT VFR BLD AUTO: 30.2 % (ref 37–48.5)
HGB BLD-MCNC: 9.1 G/DL (ref 12–16)
HYPOCHROMIA BLD QL SMEAR: ABNORMAL
IMM GRANULOCYTES # BLD AUTO: 0.03 K/UL (ref 0–0.04)
IMM GRANULOCYTES NFR BLD AUTO: 0.6 % (ref 0–0.5)
LYMPHOCYTES # BLD AUTO: 1.3 K/UL (ref 1–4.8)
LYMPHOCYTES NFR BLD: 25.8 % (ref 18–48)
MAGNESIUM SERPL-MCNC: 2 MG/DL (ref 1.6–2.6)
MCH RBC QN AUTO: 18 PG (ref 27–31)
MCHC RBC AUTO-ENTMCNC: 30.1 G/DL (ref 32–36)
MCV RBC AUTO: 60 FL (ref 82–98)
MONOCYTES # BLD AUTO: 0.4 K/UL (ref 0.3–1)
MONOCYTES NFR BLD: 8.5 % (ref 4–15)
NEUTROPHILS # BLD AUTO: 3 K/UL (ref 1.8–7.7)
NEUTROPHILS NFR BLD: 59.1 % (ref 38–73)
NRBC BLD-RTO: 1 /100 WBC
OVALOCYTES BLD QL SMEAR: ABNORMAL
PLATELET # BLD AUTO: 206 K/UL (ref 150–450)
PLATELET BLD QL SMEAR: ABNORMAL
PMV BLD AUTO: ABNORMAL FL (ref 9.2–12.9)
POCT GLUCOSE: 106 MG/DL (ref 70–110)
POCT GLUCOSE: 123 MG/DL (ref 70–110)
POCT GLUCOSE: 133 MG/DL (ref 70–110)
POCT GLUCOSE: 148 MG/DL (ref 70–110)
POIKILOCYTOSIS BLD QL SMEAR: ABNORMAL
POLYCHROMASIA BLD QL SMEAR: ABNORMAL
POTASSIUM SERPL-SCNC: 4.3 MMOL/L (ref 3.5–5.1)
RBC # BLD AUTO: 5.06 M/UL (ref 4–5.4)
SCHISTOCYTES BLD QL SMEAR: PRESENT
SODIUM SERPL-SCNC: 138 MMOL/L (ref 136–145)
SPHEROCYTES BLD QL SMEAR: ABNORMAL
TARGETS BLD QL SMEAR: ABNORMAL
WBC # BLD AUTO: 5.15 K/UL (ref 3.9–12.7)

## 2021-09-21 PROCEDURE — 63600175 PHARM REV CODE 636 W HCPCS: Performed by: HOSPITALIST

## 2021-09-21 PROCEDURE — 27000221 HC OXYGEN, UP TO 24 HOURS

## 2021-09-21 PROCEDURE — 99233 PR SUBSEQUENT HOSPITAL CARE,LEVL III: ICD-10-PCS | Mod: ,,, | Performed by: INTERNAL MEDICINE

## 2021-09-21 PROCEDURE — 90833 PSYTX W PT W E/M 30 MIN: CPT | Mod: AF,HB,, | Performed by: PSYCHIATRY & NEUROLOGY

## 2021-09-21 PROCEDURE — 99233 PR SUBSEQUENT HOSPITAL CARE,LEVL III: ICD-10-PCS | Mod: AF,HB,, | Performed by: PSYCHIATRY & NEUROLOGY

## 2021-09-21 PROCEDURE — 94640 AIRWAY INHALATION TREATMENT: CPT

## 2021-09-21 PROCEDURE — 25000003 PHARM REV CODE 250: Performed by: INTERNAL MEDICINE

## 2021-09-21 PROCEDURE — 25000003 PHARM REV CODE 250: Performed by: HOSPITALIST

## 2021-09-21 PROCEDURE — 25000003 PHARM REV CODE 250: Performed by: NURSE PRACTITIONER

## 2021-09-21 PROCEDURE — 25000003 PHARM REV CODE 250: Performed by: PSYCHIATRY & NEUROLOGY

## 2021-09-21 PROCEDURE — 99233 SBSQ HOSP IP/OBS HIGH 50: CPT | Mod: AF,HB,, | Performed by: PSYCHIATRY & NEUROLOGY

## 2021-09-21 PROCEDURE — 11000001 HC ACUTE MED/SURG PRIVATE ROOM

## 2021-09-21 PROCEDURE — 36415 COLL VENOUS BLD VENIPUNCTURE: CPT | Performed by: NURSE PRACTITIONER

## 2021-09-21 PROCEDURE — 90833 PR PSYCHOTHERAPY W/PATIENT W/E&M, 30 MIN (ADD ON): ICD-10-PCS | Mod: AF,HB,, | Performed by: PSYCHIATRY & NEUROLOGY

## 2021-09-21 PROCEDURE — 99900035 HC TECH TIME PER 15 MIN (STAT)

## 2021-09-21 PROCEDURE — 80048 BASIC METABOLIC PNL TOTAL CA: CPT | Performed by: HOSPITALIST

## 2021-09-21 PROCEDURE — 83735 ASSAY OF MAGNESIUM: CPT | Performed by: NURSE PRACTITIONER

## 2021-09-21 PROCEDURE — 99233 SBSQ HOSP IP/OBS HIGH 50: CPT | Mod: ,,, | Performed by: INTERNAL MEDICINE

## 2021-09-21 PROCEDURE — 94761 N-INVAS EAR/PLS OXIMETRY MLT: CPT

## 2021-09-21 RX ORDER — FUROSEMIDE 40 MG/1
40 TABLET ORAL DAILY
Status: DISCONTINUED | OUTPATIENT
Start: 2021-09-22 | End: 2021-09-22

## 2021-09-21 RX ORDER — TALC
6 POWDER (GRAM) TOPICAL NIGHTLY
Status: DISCONTINUED | OUTPATIENT
Start: 2021-09-21 | End: 2021-09-22 | Stop reason: HOSPADM

## 2021-09-21 RX ADMIN — SACUBITRIL AND VALSARTAN 1 TABLET: 97; 103 TABLET, FILM COATED ORAL at 09:09

## 2021-09-21 RX ADMIN — ATORVASTATIN CALCIUM 40 MG: 40 TABLET, FILM COATED ORAL at 08:09

## 2021-09-21 RX ADMIN — Medication 6 MG: at 09:09

## 2021-09-21 RX ADMIN — PANTOPRAZOLE SODIUM 40 MG: 40 TABLET, DELAYED RELEASE ORAL at 08:09

## 2021-09-21 RX ADMIN — SERTRALINE HYDROCHLORIDE 25 MG: 25 TABLET ORAL at 08:09

## 2021-09-21 RX ADMIN — APIXABAN 5 MG: 5 TABLET, FILM COATED ORAL at 08:09

## 2021-09-21 RX ADMIN — ROPINIROLE HYDROCHLORIDE 4 MG: 1 TABLET, FILM COATED ORAL at 09:09

## 2021-09-21 RX ADMIN — APIXABAN 5 MG: 5 TABLET, FILM COATED ORAL at 09:09

## 2021-09-21 RX ADMIN — FLUTICASONE FUROATE AND VILANTEROL TRIFENATATE 1 PUFF: 100; 25 POWDER RESPIRATORY (INHALATION) at 08:09

## 2021-09-21 RX ADMIN — POLYETHYLENE GLYCOL (3350) 17 G: 17 POWDER, FOR SOLUTION ORAL at 08:09

## 2021-09-21 RX ADMIN — SACUBITRIL AND VALSARTAN 1 TABLET: 97; 103 TABLET, FILM COATED ORAL at 08:09

## 2021-09-21 RX ADMIN — FUROSEMIDE 40 MG: 10 INJECTION, SOLUTION INTRAMUSCULAR; INTRAVENOUS at 08:09

## 2021-09-21 RX ADMIN — GUAIFENESIN AND DEXTROMETHORPHAN HYDROBROMIDE 1 TABLET: 600; 30 TABLET, EXTENDED RELEASE ORAL at 09:09

## 2021-09-21 RX ADMIN — ONDANSETRON 4 MG: 2 INJECTION INTRAMUSCULAR; INTRAVENOUS at 09:09

## 2021-09-21 RX ADMIN — TIOTROPIUM BROMIDE INHALATION SPRAY 2 PUFF: 3.12 SPRAY, METERED RESPIRATORY (INHALATION) at 08:09

## 2021-09-21 RX ADMIN — METOPROLOL SUCCINATE 100 MG: 50 TABLET, EXTENDED RELEASE ORAL at 08:09

## 2021-09-21 RX ADMIN — GUAIFENESIN AND DEXTROMETHORPHAN HYDROBROMIDE 1 TABLET: 600; 30 TABLET, EXTENDED RELEASE ORAL at 08:09

## 2021-09-21 RX ADMIN — METOPROLOL SUCCINATE 100 MG: 50 TABLET, EXTENDED RELEASE ORAL at 09:09

## 2021-09-22 VITALS
DIASTOLIC BLOOD PRESSURE: 84 MMHG | RESPIRATION RATE: 16 BRPM | TEMPERATURE: 98 F | OXYGEN SATURATION: 98 % | HEART RATE: 59 BPM | WEIGHT: 224 LBS | BODY MASS INDEX: 36 KG/M2 | HEIGHT: 66 IN | SYSTOLIC BLOOD PRESSURE: 149 MMHG

## 2021-09-22 LAB
ANION GAP SERPL CALC-SCNC: 10 MMOL/L (ref 8–16)
ANISOCYTOSIS BLD QL SMEAR: ABNORMAL
BASOPHILS # BLD AUTO: 0.04 K/UL (ref 0–0.2)
BASOPHILS NFR BLD: 0.9 % (ref 0–1.9)
BUN SERPL-MCNC: 43 MG/DL (ref 6–20)
BURR CELLS BLD QL SMEAR: ABNORMAL
CALCIUM SERPL-MCNC: 9.1 MG/DL (ref 8.7–10.5)
CHLORIDE SERPL-SCNC: 104 MMOL/L (ref 95–110)
CO2 SERPL-SCNC: 25 MMOL/L (ref 23–29)
CREAT SERPL-MCNC: 1.7 MG/DL (ref 0.5–1.4)
DACRYOCYTES BLD QL SMEAR: ABNORMAL
DIFFERENTIAL METHOD: ABNORMAL
EOSINOPHIL # BLD AUTO: 0.3 K/UL (ref 0–0.5)
EOSINOPHIL NFR BLD: 5.4 % (ref 0–8)
ERYTHROCYTE [DISTWIDTH] IN BLOOD BY AUTOMATED COUNT: 29.2 % (ref 11.5–14.5)
EST. GFR  (AFRICAN AMERICAN): 38 ML/MIN/1.73 M^2
EST. GFR  (NON AFRICAN AMERICAN): 33 ML/MIN/1.73 M^2
GLUCOSE SERPL-MCNC: 86 MG/DL (ref 70–110)
HCT VFR BLD AUTO: 30.7 % (ref 37–48.5)
HGB BLD-MCNC: 9 G/DL (ref 12–16)
HYPOCHROMIA BLD QL SMEAR: ABNORMAL
IMM GRANULOCYTES # BLD AUTO: 0.03 K/UL (ref 0–0.04)
IMM GRANULOCYTES NFR BLD AUTO: 0.7 % (ref 0–0.5)
LYMPHOCYTES # BLD AUTO: 1.2 K/UL (ref 1–4.8)
LYMPHOCYTES NFR BLD: 26.1 % (ref 18–48)
MAGNESIUM SERPL-MCNC: 2.1 MG/DL (ref 1.6–2.6)
MCH RBC QN AUTO: 17.1 PG (ref 27–31)
MCHC RBC AUTO-ENTMCNC: 29.3 G/DL (ref 32–36)
MCV RBC AUTO: 58 FL (ref 82–98)
MONOCYTES # BLD AUTO: 0.4 K/UL (ref 0.3–1)
MONOCYTES NFR BLD: 9.6 % (ref 4–15)
NEUTROPHILS # BLD AUTO: 2.6 K/UL (ref 1.8–7.7)
NEUTROPHILS NFR BLD: 57.3 % (ref 38–73)
NRBC BLD-RTO: 2 /100 WBC
OVALOCYTES BLD QL SMEAR: ABNORMAL
PLATELET # BLD AUTO: 212 K/UL (ref 150–450)
PLATELET BLD QL SMEAR: ABNORMAL
PMV BLD AUTO: ABNORMAL FL (ref 9.2–12.9)
POCT GLUCOSE: 109 MG/DL (ref 70–110)
POIKILOCYTOSIS BLD QL SMEAR: ABNORMAL
POLYCHROMASIA BLD QL SMEAR: ABNORMAL
POTASSIUM SERPL-SCNC: 4.1 MMOL/L (ref 3.5–5.1)
RBC # BLD AUTO: 5.27 M/UL (ref 4–5.4)
SCHISTOCYTES BLD QL SMEAR: PRESENT
SODIUM SERPL-SCNC: 139 MMOL/L (ref 136–145)
SPHEROCYTES BLD QL SMEAR: ABNORMAL
TARGETS BLD QL SMEAR: ABNORMAL
WBC # BLD AUTO: 4.59 K/UL (ref 3.9–12.7)

## 2021-09-22 PROCEDURE — 25000003 PHARM REV CODE 250: Performed by: INTERNAL MEDICINE

## 2021-09-22 PROCEDURE — 25000003 PHARM REV CODE 250: Performed by: NURSE PRACTITIONER

## 2021-09-22 PROCEDURE — 25000003 PHARM REV CODE 250: Performed by: HOSPITALIST

## 2021-09-22 PROCEDURE — 83735 ASSAY OF MAGNESIUM: CPT | Performed by: NURSE PRACTITIONER

## 2021-09-22 PROCEDURE — 94761 N-INVAS EAR/PLS OXIMETRY MLT: CPT

## 2021-09-22 PROCEDURE — 85025 COMPLETE CBC W/AUTO DIFF WBC: CPT | Performed by: NURSE PRACTITIONER

## 2021-09-22 PROCEDURE — 25000003 PHARM REV CODE 250: Performed by: PSYCHIATRY & NEUROLOGY

## 2021-09-22 PROCEDURE — 94640 AIRWAY INHALATION TREATMENT: CPT

## 2021-09-22 PROCEDURE — 27000221 HC OXYGEN, UP TO 24 HOURS

## 2021-09-22 PROCEDURE — 80048 BASIC METABOLIC PNL TOTAL CA: CPT | Performed by: HOSPITALIST

## 2021-09-22 RX ORDER — METOPROLOL SUCCINATE 100 MG/1
100 TABLET, EXTENDED RELEASE ORAL 2 TIMES DAILY
Qty: 60 TABLET | Refills: 11 | Status: SHIPPED | OUTPATIENT
Start: 2021-09-22 | End: 2021-12-22 | Stop reason: SDUPTHER

## 2021-09-22 RX ORDER — FUROSEMIDE 40 MG/1
40 TABLET ORAL 2 TIMES DAILY
Status: DISCONTINUED | OUTPATIENT
Start: 2021-09-22 | End: 2021-09-22

## 2021-09-22 RX ORDER — DULOXETIN HYDROCHLORIDE 30 MG/1
30 CAPSULE, DELAYED RELEASE ORAL DAILY
Qty: 5 CAPSULE | Refills: 0 | Status: ON HOLD | OUTPATIENT
Start: 2021-09-23 | End: 2021-12-09 | Stop reason: HOSPADM

## 2021-09-22 RX ORDER — SERTRALINE HYDROCHLORIDE 25 MG/1
25 TABLET, FILM COATED ORAL DAILY
Qty: 30 TABLET | Refills: 11 | Status: CANCELLED | OUTPATIENT
Start: 2021-09-23 | End: 2022-09-23

## 2021-09-22 RX ORDER — SERTRALINE HYDROCHLORIDE 25 MG/1
25 TABLET, FILM COATED ORAL DAILY
Qty: 60 TABLET | Refills: 11 | Status: ON HOLD | OUTPATIENT
Start: 2021-09-23 | End: 2021-11-02 | Stop reason: HOSPADM

## 2021-09-22 RX ADMIN — DULOXETINE 30 MG: 30 CAPSULE, DELAYED RELEASE ORAL at 08:09

## 2021-09-22 RX ADMIN — METOPROLOL SUCCINATE 100 MG: 50 TABLET, EXTENDED RELEASE ORAL at 08:09

## 2021-09-22 RX ADMIN — GUAIFENESIN AND DEXTROMETHORPHAN HYDROBROMIDE 1 TABLET: 600; 30 TABLET, EXTENDED RELEASE ORAL at 08:09

## 2021-09-22 RX ADMIN — FUROSEMIDE 60 MG: 40 TABLET ORAL at 08:09

## 2021-09-22 RX ADMIN — APIXABAN 5 MG: 5 TABLET, FILM COATED ORAL at 08:09

## 2021-09-22 RX ADMIN — TIOTROPIUM BROMIDE INHALATION SPRAY 2 PUFF: 3.12 SPRAY, METERED RESPIRATORY (INHALATION) at 08:09

## 2021-09-22 RX ADMIN — ATORVASTATIN CALCIUM 40 MG: 40 TABLET, FILM COATED ORAL at 08:09

## 2021-09-22 RX ADMIN — SERTRALINE HYDROCHLORIDE 25 MG: 25 TABLET ORAL at 08:09

## 2021-09-22 RX ADMIN — SACUBITRIL AND VALSARTAN 1 TABLET: 97; 103 TABLET, FILM COATED ORAL at 08:09

## 2021-09-22 RX ADMIN — FLUTICASONE FUROATE AND VILANTEROL TRIFENATATE 1 PUFF: 100; 25 POWDER RESPIRATORY (INHALATION) at 08:09

## 2021-09-22 RX ADMIN — PANTOPRAZOLE SODIUM 40 MG: 40 TABLET, DELAYED RELEASE ORAL at 08:09

## 2021-09-23 ENCOUNTER — PATIENT MESSAGE (OUTPATIENT)
Dept: ADMINISTRATIVE | Facility: CLINIC | Age: 56
End: 2021-09-23

## 2021-09-23 ENCOUNTER — PATIENT OUTREACH (OUTPATIENT)
Dept: ADMINISTRATIVE | Facility: CLINIC | Age: 56
End: 2021-09-23

## 2021-10-18 ENCOUNTER — PATIENT MESSAGE (OUTPATIENT)
Dept: TRANSPLANT | Facility: CLINIC | Age: 56
End: 2021-10-18
Payer: MEDICAID

## 2021-10-26 ENCOUNTER — HOSPITAL ENCOUNTER (OUTPATIENT)
Facility: HOSPITAL | Age: 56
Discharge: HOME | End: 2021-10-29
Attending: EMERGENCY MEDICINE | Admitting: STUDENT IN AN ORGANIZED HEALTH CARE EDUCATION/TRAINING PROGRAM
Payer: MEDICAID

## 2021-10-26 DIAGNOSIS — R07.89 CHEST WALL PAIN: ICD-10-CM
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
## 2021-10-26 DIAGNOSIS — I50.9 CHRONIC CONGESTIVE HEART FAILURE, UNSPECIFIED HEART FAILURE TYPE: ICD-10-CM

## 2021-10-26 DIAGNOSIS — R18.8 FREE FLUID IN PELVIS: ICD-10-CM

## 2021-10-26 DIAGNOSIS — N17.9 AKI (ACUTE KIDNEY INJURY): ICD-10-CM

## 2021-10-26 DIAGNOSIS — I25.10 CARDIOVASCULAR DISEASE: ICD-10-CM

## 2021-10-26 DIAGNOSIS — R06.00 DYSPNEA: ICD-10-CM

## 2021-10-26 DIAGNOSIS — R79.89 ELEVATED TROPONIN: ICD-10-CM

## 2021-10-26 DIAGNOSIS — E87.5 HYPERKALEMIA: ICD-10-CM

## 2021-10-26 DIAGNOSIS — R10.9 FLANK PAIN: Primary | ICD-10-CM

## 2021-10-26 LAB
ABO + RH BLD: NORMAL
ALBUMIN SERPL BCP-MCNC: 3.4 G/DL (ref 3.5–5.2)
ALP SERPL-CCNC: 56 U/L (ref 55–135)
ALT SERPL W/O P-5'-P-CCNC: 35 U/L (ref 10–44)
AMORPH CRY URNS QL MICRO: ABNORMAL
ANION GAP SERPL CALC-SCNC: 14 MMOL/L (ref 8–16)
ANISOCYTOSIS BLD QL SMEAR: ABNORMAL
APTT BLDCRRT: 24.8 SEC (ref 21–32)
AST SERPL-CCNC: 27 U/L (ref 10–40)
BACTERIA #/AREA URNS HPF: ABNORMAL /HPF
BASOPHILS # BLD AUTO: 0.06 K/UL (ref 0–0.2)
BASOPHILS NFR BLD: 1 % (ref 0–1.9)
BILIRUB SERPL-MCNC: 2.8 MG/DL (ref 0.1–1)
BILIRUB UR QL STRIP: NEGATIVE
BLD GP AB SCN CELLS X3 SERPL QL: NORMAL
BNP SERPL-MCNC: 4730 PG/ML (ref 0–99)
BUN SERPL-MCNC: 59 MG/DL (ref 6–20)
CALCIUM SERPL-MCNC: 9.4 MG/DL (ref 8.7–10.5)
CHLORIDE SERPL-SCNC: 108 MMOL/L (ref 95–110)
CK MB SERPL-MCNC: 3.3 NG/ML (ref 0.1–6.5)
CK MB SERPL-RTO: 5.6 % (ref 0–5)
CK SERPL-CCNC: 59 U/L (ref 20–180)
CK SERPL-CCNC: 59 U/L (ref 20–180)
CLARITY UR: ABNORMAL
CO2 SERPL-SCNC: 21 MMOL/L (ref 23–29)
COLOR UR: YELLOW
CREAT SERPL-MCNC: 2.5 MG/DL (ref 0.5–1.4)
DACRYOCYTES BLD QL SMEAR: ABNORMAL
DIFFERENTIAL METHOD: ABNORMAL
EOSINOPHIL # BLD AUTO: 0.1 K/UL (ref 0–0.5)
EOSINOPHIL NFR BLD: 1.9 % (ref 0–8)
ERYTHROCYTE [DISTWIDTH] IN BLOOD BY AUTOMATED COUNT: 29.9 % (ref 11.5–14.5)
EST. GFR  (AFRICAN AMERICAN): 24 ML/MIN/1.73 M^2
EST. GFR  (NON AFRICAN AMERICAN): 21 ML/MIN/1.73 M^2
GLUCOSE SERPL-MCNC: 124 MG/DL (ref 70–110)
GLUCOSE UR QL STRIP: ABNORMAL
HCT VFR BLD AUTO: 34.8 % (ref 37–48.5)
HGB BLD-MCNC: 10.2 G/DL (ref 12–16)
HGB UR QL STRIP: ABNORMAL
HYALINE CASTS #/AREA URNS LPF: 2 /LPF
HYPOCHROMIA BLD QL SMEAR: ABNORMAL
IMM GRANULOCYTES # BLD AUTO: 0.06 K/UL (ref 0–0.04)
IMM GRANULOCYTES NFR BLD AUTO: 1 % (ref 0–0.5)
INR PPP: 1 (ref 0.8–1.2)
KETONES UR QL STRIP: NEGATIVE
LACTATE SERPL-SCNC: 0.9 MMOL/L (ref 0.5–2.2)
LEUKOCYTE ESTERASE UR QL STRIP: NEGATIVE
LYMPHOCYTES # BLD AUTO: 1.5 K/UL (ref 1–4.8)
LYMPHOCYTES NFR BLD: 24.4 % (ref 18–48)
MCH RBC QN AUTO: 17.6 PG (ref 27–31)
MCHC RBC AUTO-ENTMCNC: 29.3 G/DL (ref 32–36)
MCV RBC AUTO: 60 FL (ref 82–98)
MICROSCOPIC COMMENT: ABNORMAL
MONOCYTES # BLD AUTO: 0.5 K/UL (ref 0.3–1)
MONOCYTES NFR BLD: 7.9 % (ref 4–15)
NEUTROPHILS # BLD AUTO: 4 K/UL (ref 1.8–7.7)
NEUTROPHILS NFR BLD: 63.8 % (ref 38–73)
NITRITE UR QL STRIP: NEGATIVE
NRBC BLD-RTO: 8 /100 WBC
OVALOCYTES BLD QL SMEAR: ABNORMAL
PH UR STRIP: 6 [PH] (ref 5–8)
PLATELET # BLD AUTO: 240 K/UL (ref 150–450)
PLATELET BLD QL SMEAR: ABNORMAL
PMV BLD AUTO: ABNORMAL FL (ref 9.2–12.9)
POIKILOCYTOSIS BLD QL SMEAR: ABNORMAL
POLYCHROMASIA BLD QL SMEAR: ABNORMAL
POTASSIUM SERPL-SCNC: 4.7 MMOL/L (ref 3.5–5.1)
PROT SERPL-MCNC: 6.5 G/DL (ref 6–8.4)
PROT UR QL STRIP: ABNORMAL
PROTHROMBIN TIME: 10.5 SEC (ref 9–12.5)
RBC # BLD AUTO: 5.81 M/UL (ref 4–5.4)
RBC #/AREA URNS HPF: 3 /HPF (ref 0–4)
SARS-COV-2 RDRP RESP QL NAA+PROBE: NEGATIVE
SCHISTOCYTES BLD QL SMEAR: PRESENT
SODIUM SERPL-SCNC: 143 MMOL/L (ref 136–145)
SP GR UR STRIP: >=1.03 (ref 1–1.03)
SQUAMOUS #/AREA URNS HPF: 60 /HPF
TARGETS BLD QL SMEAR: ABNORMAL
TROPONIN I SERPL DL<=0.01 NG/ML-MCNC: 0.79 NG/ML (ref 0–0.03)
URN SPEC COLLECT METH UR: ABNORMAL
UROBILINOGEN UR STRIP-ACNC: NEGATIVE EU/DL
WBC # BLD AUTO: 6.2 K/UL (ref 3.9–12.7)
WBC #/AREA URNS HPF: 3 /HPF (ref 0–5)

## 2021-10-26 PROCEDURE — 94640 AIRWAY INHALATION TREATMENT: CPT

## 2021-10-26 PROCEDURE — 99285 EMERGENCY DEPT VISIT HI MDM: CPT | Mod: 25

## 2021-10-26 PROCEDURE — 96376 TX/PRO/DX INJ SAME DRUG ADON: CPT

## 2021-10-26 PROCEDURE — 84484 ASSAY OF TROPONIN QUANT: CPT | Performed by: EMERGENCY MEDICINE

## 2021-10-26 PROCEDURE — 80053 COMPREHEN METABOLIC PANEL: CPT | Performed by: EMERGENCY MEDICINE

## 2021-10-26 PROCEDURE — 83605 ASSAY OF LACTIC ACID: CPT | Performed by: EMERGENCY MEDICINE

## 2021-10-26 PROCEDURE — 36415 COLL VENOUS BLD VENIPUNCTURE: CPT | Performed by: STUDENT IN AN ORGANIZED HEALTH CARE EDUCATION/TRAINING PROGRAM

## 2021-10-26 PROCEDURE — 27000221 HC OXYGEN, UP TO 24 HOURS

## 2021-10-26 PROCEDURE — 96374 THER/PROPH/DIAG INJ IV PUSH: CPT

## 2021-10-26 PROCEDURE — 96375 TX/PRO/DX INJ NEW DRUG ADDON: CPT

## 2021-10-26 PROCEDURE — 93010 EKG 12-LEAD: ICD-10-PCS | Mod: ,,, | Performed by: INTERNAL MEDICINE

## 2021-10-26 PROCEDURE — 85610 PROTHROMBIN TIME: CPT | Performed by: EMERGENCY MEDICINE

## 2021-10-26 PROCEDURE — 81000 URINALYSIS NONAUTO W/SCOPE: CPT | Performed by: EMERGENCY MEDICINE

## 2021-10-26 PROCEDURE — 85730 THROMBOPLASTIN TIME PARTIAL: CPT | Performed by: EMERGENCY MEDICINE

## 2021-10-26 PROCEDURE — 82553 CREATINE MB FRACTION: CPT | Performed by: EMERGENCY MEDICINE

## 2021-10-26 PROCEDURE — U0002 COVID-19 LAB TEST NON-CDC: HCPCS | Performed by: STUDENT IN AN ORGANIZED HEALTH CARE EDUCATION/TRAINING PROGRAM

## 2021-10-26 PROCEDURE — 63600175 PHARM REV CODE 636 W HCPCS: Performed by: EMERGENCY MEDICINE

## 2021-10-26 PROCEDURE — 83880 ASSAY OF NATRIURETIC PEPTIDE: CPT | Performed by: EMERGENCY MEDICINE

## 2021-10-26 PROCEDURE — 86900 BLOOD TYPING SEROLOGIC ABO: CPT | Performed by: STUDENT IN AN ORGANIZED HEALTH CARE EDUCATION/TRAINING PROGRAM

## 2021-10-26 PROCEDURE — 93010 ELECTROCARDIOGRAM REPORT: CPT | Mod: ,,, | Performed by: INTERNAL MEDICINE

## 2021-10-26 PROCEDURE — 36415 COLL VENOUS BLD VENIPUNCTURE: CPT | Performed by: EMERGENCY MEDICINE

## 2021-10-26 PROCEDURE — 25000003 PHARM REV CODE 250: Performed by: EMERGENCY MEDICINE

## 2021-10-26 PROCEDURE — 93005 ELECTROCARDIOGRAM TRACING: CPT

## 2021-10-26 PROCEDURE — 25000242 PHARM REV CODE 250 ALT 637 W/ HCPCS: Performed by: EMERGENCY MEDICINE

## 2021-10-26 PROCEDURE — 85025 COMPLETE CBC W/AUTO DIFF WBC: CPT | Performed by: EMERGENCY MEDICINE

## 2021-10-26 RX ORDER — ONDANSETRON 2 MG/ML
4 INJECTION INTRAMUSCULAR; INTRAVENOUS
Status: COMPLETED | OUTPATIENT
Start: 2021-10-26 | End: 2021-10-26

## 2021-10-26 RX ORDER — BENZONATATE 100 MG/1
100 CAPSULE ORAL ONCE
Status: COMPLETED | OUTPATIENT
Start: 2021-10-26 | End: 2021-10-26

## 2021-10-26 RX ORDER — IPRATROPIUM BROMIDE AND ALBUTEROL SULFATE 2.5; .5 MG/3ML; MG/3ML
3 SOLUTION RESPIRATORY (INHALATION)
Status: COMPLETED | OUTPATIENT
Start: 2021-10-26 | End: 2021-10-26

## 2021-10-26 RX ORDER — METHYLPREDNISOLONE SOD SUCC 125 MG
125 VIAL (EA) INJECTION
Status: COMPLETED | OUTPATIENT
Start: 2021-10-26 | End: 2021-10-26

## 2021-10-26 RX ADMIN — ONDANSETRON 4 MG: 2 INJECTION INTRAMUSCULAR; INTRAVENOUS at 05:10

## 2021-10-26 RX ADMIN — IPRATROPIUM BROMIDE AND ALBUTEROL SULFATE 3 ML: .5; 3 SOLUTION RESPIRATORY (INHALATION) at 05:10

## 2021-10-26 RX ADMIN — METHYLPREDNISOLONE SODIUM SUCCINATE 125 MG: 125 INJECTION, POWDER, FOR SOLUTION INTRAMUSCULAR; INTRAVENOUS at 05:10

## 2021-10-26 RX ADMIN — BENZONATATE 100 MG: 100 CAPSULE ORAL at 05:10

## 2021-10-27 LAB
ALBUMIN SERPL BCP-MCNC: 3.3 G/DL (ref 3.5–5.2)
ALP SERPL-CCNC: 50 U/L (ref 55–135)
ALT SERPL W/O P-5'-P-CCNC: 36 U/L (ref 10–44)
ANION GAP SERPL CALC-SCNC: 11 MMOL/L (ref 8–16)
ANISOCYTOSIS BLD QL SMEAR: ABNORMAL
AST SERPL-CCNC: 25 U/L (ref 10–40)
BASOPHILS # BLD AUTO: 0.02 K/UL (ref 0–0.2)
BASOPHILS NFR BLD: 0.3 % (ref 0–1.9)
BILIRUB SERPL-MCNC: 2 MG/DL (ref 0.1–1)
BUN SERPL-MCNC: 59 MG/DL (ref 6–20)
CALCIUM SERPL-MCNC: 9.3 MG/DL (ref 8.7–10.5)
CHLORIDE SERPL-SCNC: 105 MMOL/L (ref 95–110)
CO2 SERPL-SCNC: 26 MMOL/L (ref 23–29)
CREAT SERPL-MCNC: 2.5 MG/DL (ref 0.5–1.4)
DACRYOCYTES BLD QL SMEAR: ABNORMAL
DIFFERENTIAL METHOD: ABNORMAL
EOSINOPHIL # BLD AUTO: 0 K/UL (ref 0–0.5)
EOSINOPHIL NFR BLD: 0 % (ref 0–8)
ERYTHROCYTE [DISTWIDTH] IN BLOOD BY AUTOMATED COUNT: 29.9 % (ref 11.5–14.5)
EST. GFR  (AFRICAN AMERICAN): 24 ML/MIN/1.73 M^2
EST. GFR  (NON AFRICAN AMERICAN): 21 ML/MIN/1.73 M^2
GLUCOSE SERPL-MCNC: 170 MG/DL (ref 70–110)
HCT VFR BLD AUTO: 35.8 % (ref 37–48.5)
HGB BLD-MCNC: 10.1 G/DL (ref 12–16)
HGB BLD-MCNC: 10.4 G/DL (ref 12–16)
HGB BLD-MCNC: 10.8 G/DL (ref 12–16)
HGB BLD-MCNC: 9.9 G/DL (ref 12–16)
HYPOCHROMIA BLD QL SMEAR: ABNORMAL
IMM GRANULOCYTES # BLD AUTO: 0.16 K/UL (ref 0–0.04)
IMM GRANULOCYTES NFR BLD AUTO: 2.3 % (ref 0–0.5)
LYMPHOCYTES # BLD AUTO: 0.6 K/UL (ref 1–4.8)
LYMPHOCYTES NFR BLD: 8 % (ref 18–48)
MCH RBC QN AUTO: 17.3 PG (ref 27–31)
MCHC RBC AUTO-ENTMCNC: 28.2 G/DL (ref 32–36)
MCV RBC AUTO: 61 FL (ref 82–98)
MONOCYTES # BLD AUTO: 0.2 K/UL (ref 0.3–1)
MONOCYTES NFR BLD: 3.4 % (ref 4–15)
NEUTROPHILS # BLD AUTO: 6.1 K/UL (ref 1.8–7.7)
NEUTROPHILS NFR BLD: 86 % (ref 38–73)
NRBC BLD-RTO: 4 /100 WBC
OVALOCYTES BLD QL SMEAR: ABNORMAL
PLATELET # BLD AUTO: 208 K/UL (ref 150–450)
PLATELET BLD QL SMEAR: ABNORMAL
PMV BLD AUTO: ABNORMAL FL (ref 9.2–12.9)
POIKILOCYTOSIS BLD QL SMEAR: ABNORMAL
POLYCHROMASIA BLD QL SMEAR: ABNORMAL
POTASSIUM SERPL-SCNC: 5.5 MMOL/L (ref 3.5–5.1)
PROT SERPL-MCNC: 6.3 G/DL (ref 6–8.4)
RBC # BLD AUTO: 5.83 M/UL (ref 4–5.4)
SCHISTOCYTES BLD QL SMEAR: PRESENT
SODIUM SERPL-SCNC: 142 MMOL/L (ref 136–145)
TARGETS BLD QL SMEAR: ABNORMAL
WBC # BLD AUTO: 7.09 K/UL (ref 3.9–12.7)

## 2021-10-27 PROCEDURE — 63600175 PHARM REV CODE 636 W HCPCS: Performed by: SURGERY

## 2021-10-27 PROCEDURE — 85018 HEMOGLOBIN: CPT | Mod: 91 | Performed by: STUDENT IN AN ORGANIZED HEALTH CARE EDUCATION/TRAINING PROGRAM

## 2021-10-27 PROCEDURE — 94640 AIRWAY INHALATION TREATMENT: CPT

## 2021-10-27 PROCEDURE — 96375 TX/PRO/DX INJ NEW DRUG ADDON: CPT

## 2021-10-27 PROCEDURE — 25000242 PHARM REV CODE 250 ALT 637 W/ HCPCS: Performed by: SURGERY

## 2021-10-27 PROCEDURE — 63600175 PHARM REV CODE 636 W HCPCS: Performed by: STUDENT IN AN ORGANIZED HEALTH CARE EDUCATION/TRAINING PROGRAM

## 2021-10-27 PROCEDURE — 80053 COMPREHEN METABOLIC PANEL: CPT | Performed by: STUDENT IN AN ORGANIZED HEALTH CARE EDUCATION/TRAINING PROGRAM

## 2021-10-27 PROCEDURE — 27000221 HC OXYGEN, UP TO 24 HOURS

## 2021-10-27 PROCEDURE — 36415 COLL VENOUS BLD VENIPUNCTURE: CPT | Performed by: STUDENT IN AN ORGANIZED HEALTH CARE EDUCATION/TRAINING PROGRAM

## 2021-10-27 PROCEDURE — 96376 TX/PRO/DX INJ SAME DRUG ADON: CPT

## 2021-10-27 PROCEDURE — 85025 COMPLETE CBC W/AUTO DIFF WBC: CPT | Performed by: STUDENT IN AN ORGANIZED HEALTH CARE EDUCATION/TRAINING PROGRAM

## 2021-10-27 PROCEDURE — 25000003 PHARM REV CODE 250: Performed by: STUDENT IN AN ORGANIZED HEALTH CARE EDUCATION/TRAINING PROGRAM

## 2021-10-27 RX ORDER — MEPERIDINE HYDROCHLORIDE 25 MG/ML
25 INJECTION INTRAMUSCULAR; INTRAVENOUS; SUBCUTANEOUS
Status: COMPLETED | OUTPATIENT
Start: 2021-10-27 | End: 2021-10-27

## 2021-10-27 RX ORDER — LEVALBUTEROL 1.25 MG/.5ML
1.25 SOLUTION, CONCENTRATE RESPIRATORY (INHALATION)
Status: COMPLETED | OUTPATIENT
Start: 2021-10-27 | End: 2021-10-27

## 2021-10-27 RX ORDER — FUROSEMIDE 10 MG/ML
40 INJECTION INTRAMUSCULAR; INTRAVENOUS
Status: DISCONTINUED | OUTPATIENT
Start: 2021-10-27 | End: 2021-10-27

## 2021-10-27 RX ORDER — FUROSEMIDE 10 MG/ML
40 INJECTION INTRAMUSCULAR; INTRAVENOUS
Status: COMPLETED | OUTPATIENT
Start: 2021-10-27 | End: 2021-10-27

## 2021-10-27 RX ORDER — MORPHINE SULFATE 4 MG/ML
4 INJECTION, SOLUTION INTRAMUSCULAR; INTRAVENOUS
Status: COMPLETED | OUTPATIENT
Start: 2021-10-27 | End: 2021-10-27

## 2021-10-27 RX ORDER — ONDANSETRON 2 MG/ML
4 INJECTION INTRAMUSCULAR; INTRAVENOUS
Status: COMPLETED | OUTPATIENT
Start: 2021-10-27 | End: 2021-10-27

## 2021-10-27 RX ORDER — BUTALBITAL, ACETAMINOPHEN AND CAFFEINE 50; 325; 40 MG/1; MG/1; MG/1
1 TABLET ORAL
Status: COMPLETED | OUTPATIENT
Start: 2021-10-27 | End: 2021-10-27

## 2021-10-27 RX ORDER — FUROSEMIDE 10 MG/ML
80 INJECTION INTRAMUSCULAR; INTRAVENOUS
Status: COMPLETED | OUTPATIENT
Start: 2021-10-27 | End: 2021-10-27

## 2021-10-27 RX ADMIN — FUROSEMIDE 80 MG: 10 INJECTION, SOLUTION INTRAMUSCULAR; INTRAVENOUS at 12:10

## 2021-10-27 RX ADMIN — BUTALBITAL, ACETAMINOPHEN, AND CAFFEINE 1 TABLET: 50; 325; 40 TABLET ORAL at 12:10

## 2021-10-27 RX ADMIN — ONDANSETRON 4 MG: 2 INJECTION INTRAMUSCULAR; INTRAVENOUS at 10:10

## 2021-10-27 RX ADMIN — MEPERIDINE HYDROCHLORIDE 25 MG: 25 INJECTION INTRAMUSCULAR; INTRAVENOUS; SUBCUTANEOUS at 10:10

## 2021-10-27 RX ADMIN — LEVALBUTEROL 1.25 MG: 1.25 SOLUTION, CONCENTRATE RESPIRATORY (INHALATION) at 10:10

## 2021-10-27 RX ADMIN — ONDANSETRON 4 MG: 2 INJECTION INTRAMUSCULAR; INTRAVENOUS at 09:10

## 2021-10-27 RX ADMIN — MORPHINE SULFATE 4 MG: 4 INJECTION INTRAVENOUS at 09:10

## 2021-10-27 RX ADMIN — FUROSEMIDE 40 MG: 10 INJECTION, SOLUTION INTRAMUSCULAR; INTRAVENOUS at 10:10

## 2021-10-28 PROBLEM — N17.9 AKI (ACUTE KIDNEY INJURY): Status: ACTIVE | Noted: 2021-10-28
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
## 2021-10-28 PROBLEM — E87.5 HYPERKALEMIA: Status: ACTIVE | Noted: 2021-10-28

## 2021-10-28 LAB
ALBUMIN SERPL BCP-MCNC: 3.1 G/DL (ref 3.5–5.2)
ALBUMIN SERPL BCP-MCNC: 3.3 G/DL (ref 3.5–5.2)
ALP SERPL-CCNC: 48 U/L (ref 55–135)
ALP SERPL-CCNC: 52 U/L (ref 55–135)
ALT SERPL W/O P-5'-P-CCNC: 35 U/L (ref 10–44)
ALT SERPL W/O P-5'-P-CCNC: 36 U/L (ref 10–44)
ANION GAP SERPL CALC-SCNC: 9 MMOL/L (ref 8–16)
ANION GAP SERPL CALC-SCNC: 9 MMOL/L (ref 8–16)
ANISOCYTOSIS BLD QL SMEAR: ABNORMAL
ANISOCYTOSIS BLD QL SMEAR: ABNORMAL
AST SERPL-CCNC: 23 U/L (ref 10–40)
AST SERPL-CCNC: 24 U/L (ref 10–40)
BASOPHILS # BLD AUTO: 0.03 K/UL (ref 0–0.2)
BASOPHILS # BLD AUTO: 0.04 K/UL (ref 0–0.2)
BASOPHILS NFR BLD: 0.4 % (ref 0–1.9)
BASOPHILS NFR BLD: 0.6 % (ref 0–1.9)
BILIRUB SERPL-MCNC: 1.5 MG/DL (ref 0.1–1)
BILIRUB SERPL-MCNC: 1.6 MG/DL (ref 0.1–1)
BNP SERPL-MCNC: 2862 PG/ML (ref 0–99)
BUN SERPL-MCNC: 63 MG/DL (ref 6–20)
BUN SERPL-MCNC: 64 MG/DL (ref 6–20)
CALCIUM SERPL-MCNC: 8.9 MG/DL (ref 8.7–10.5)
CALCIUM SERPL-MCNC: 9.1 MG/DL (ref 8.7–10.5)
CHLORIDE SERPL-SCNC: 104 MMOL/L (ref 95–110)
CHLORIDE SERPL-SCNC: 105 MMOL/L (ref 95–110)
CK MB SERPL-MCNC: 3.8 NG/ML (ref 0.1–6.5)
CK MB SERPL-RTO: 11.5 % (ref 0–5)
CK SERPL-CCNC: 33 U/L (ref 20–180)
CK SERPL-CCNC: 33 U/L (ref 20–180)
CO2 SERPL-SCNC: 27 MMOL/L (ref 23–29)
CO2 SERPL-SCNC: 28 MMOL/L (ref 23–29)
CREAT SERPL-MCNC: 2.4 MG/DL (ref 0.5–1.4)
CREAT SERPL-MCNC: 2.4 MG/DL (ref 0.5–1.4)
DACRYOCYTES BLD QL SMEAR: ABNORMAL
DACRYOCYTES BLD QL SMEAR: ABNORMAL
DIFFERENTIAL METHOD: ABNORMAL
DIFFERENTIAL METHOD: ABNORMAL
EOSINOPHIL # BLD AUTO: 0.1 K/UL (ref 0–0.5)
EOSINOPHIL # BLD AUTO: 0.1 K/UL (ref 0–0.5)
EOSINOPHIL NFR BLD: 1.4 % (ref 0–8)
EOSINOPHIL NFR BLD: 1.4 % (ref 0–8)
ERYTHROCYTE [DISTWIDTH] IN BLOOD BY AUTOMATED COUNT: 30 % (ref 11.5–14.5)
ERYTHROCYTE [DISTWIDTH] IN BLOOD BY AUTOMATED COUNT: 30.8 % (ref 11.5–14.5)
EST. GFR  (AFRICAN AMERICAN): 25 ML/MIN/1.73 M^2
EST. GFR  (AFRICAN AMERICAN): 25 ML/MIN/1.73 M^2
EST. GFR  (NON AFRICAN AMERICAN): 22 ML/MIN/1.73 M^2
EST. GFR  (NON AFRICAN AMERICAN): 22 ML/MIN/1.73 M^2
GLUCOSE SERPL-MCNC: 110 MG/DL (ref 70–110)
GLUCOSE SERPL-MCNC: 125 MG/DL (ref 70–110)
HCT VFR BLD AUTO: 35.3 % (ref 37–48.5)
HCT VFR BLD AUTO: 36.2 % (ref 37–48.5)
HGB BLD-MCNC: 10.1 G/DL (ref 12–16)
HGB BLD-MCNC: 10.3 G/DL (ref 12–16)
HGB BLD-MCNC: 9.7 G/DL (ref 12–16)
HGB BLD-MCNC: 9.7 G/DL (ref 12–16)
HYPOCHROMIA BLD QL SMEAR: ABNORMAL
IMM GRANULOCYTES # BLD AUTO: 0.09 K/UL (ref 0–0.04)
IMM GRANULOCYTES # BLD AUTO: 0.1 K/UL (ref 0–0.04)
IMM GRANULOCYTES NFR BLD AUTO: 1.2 % (ref 0–0.5)
IMM GRANULOCYTES NFR BLD AUTO: 1.5 % (ref 0–0.5)
LYMPHOCYTES # BLD AUTO: 1.2 K/UL (ref 1–4.8)
LYMPHOCYTES # BLD AUTO: 1.3 K/UL (ref 1–4.8)
LYMPHOCYTES NFR BLD: 16 % (ref 18–48)
LYMPHOCYTES NFR BLD: 20.3 % (ref 18–48)
MCH RBC QN AUTO: 17.1 PG (ref 27–31)
MCH RBC QN AUTO: 17.2 PG (ref 27–31)
MCHC RBC AUTO-ENTMCNC: 27.5 G/DL (ref 32–36)
MCHC RBC AUTO-ENTMCNC: 27.9 G/DL (ref 32–36)
MCV RBC AUTO: 62 FL (ref 82–98)
MCV RBC AUTO: 63 FL (ref 82–98)
MONOCYTES # BLD AUTO: 0.5 K/UL (ref 0.3–1)
MONOCYTES # BLD AUTO: 0.6 K/UL (ref 0.3–1)
MONOCYTES NFR BLD: 7.4 % (ref 4–15)
MONOCYTES NFR BLD: 7.6 % (ref 4–15)
NEUTROPHILS # BLD AUTO: 4.5 K/UL (ref 1.8–7.7)
NEUTROPHILS # BLD AUTO: 5.4 K/UL (ref 1.8–7.7)
NEUTROPHILS NFR BLD: 68.6 % (ref 38–73)
NEUTROPHILS NFR BLD: 73.6 % (ref 38–73)
NRBC BLD-RTO: 11 /100 WBC
NRBC BLD-RTO: 7 /100 WBC
OVALOCYTES BLD QL SMEAR: ABNORMAL
OVALOCYTES BLD QL SMEAR: ABNORMAL
PLATELET # BLD AUTO: 199 K/UL (ref 150–450)
PLATELET # BLD AUTO: 240 K/UL (ref 150–450)
PLATELET BLD QL SMEAR: ABNORMAL
PLATELET BLD QL SMEAR: ABNORMAL
PMV BLD AUTO: ABNORMAL FL (ref 9.2–12.9)
PMV BLD AUTO: ABNORMAL FL (ref 9.2–12.9)
POCT GLUCOSE: 102 MG/DL (ref 70–110)
POCT GLUCOSE: 134 MG/DL (ref 70–110)
POIKILOCYTOSIS BLD QL SMEAR: ABNORMAL
POIKILOCYTOSIS BLD QL SMEAR: ABNORMAL
POLYCHROMASIA BLD QL SMEAR: ABNORMAL
POLYCHROMASIA BLD QL SMEAR: ABNORMAL
POTASSIUM SERPL-SCNC: 5.4 MMOL/L (ref 3.5–5.1)
POTASSIUM SERPL-SCNC: 5.7 MMOL/L (ref 3.5–5.1)
PROT SERPL-MCNC: 5.9 G/DL (ref 6–8.4)
PROT SERPL-MCNC: 6.2 G/DL (ref 6–8.4)
RBC # BLD AUTO: 5.64 M/UL (ref 4–5.4)
RBC # BLD AUTO: 5.89 M/UL (ref 4–5.4)
SCHISTOCYTES BLD QL SMEAR: PRESENT
SCHISTOCYTES BLD QL SMEAR: PRESENT
SODIUM SERPL-SCNC: 141 MMOL/L (ref 136–145)
SODIUM SERPL-SCNC: 141 MMOL/L (ref 136–145)
SPHEROCYTES BLD QL SMEAR: ABNORMAL
SPHEROCYTES BLD QL SMEAR: ABNORMAL
TARGETS BLD QL SMEAR: ABNORMAL
TARGETS BLD QL SMEAR: ABNORMAL
TROPONIN I SERPL DL<=0.01 NG/ML-MCNC: 0.61 NG/ML (ref 0–0.03)
TROPONIN I SERPL DL<=0.01 NG/ML-MCNC: 0.64 NG/ML (ref 0–0.03)
TROPONIN I SERPL DL<=0.01 NG/ML-MCNC: 0.65 NG/ML (ref 0–0.03)
WBC # BLD AUTO: 6.61 K/UL (ref 3.9–12.7)
WBC # BLD AUTO: 7.39 K/UL (ref 3.9–12.7)

## 2021-10-28 PROCEDURE — 94640 AIRWAY INHALATION TREATMENT: CPT

## 2021-10-28 PROCEDURE — 80053 COMPREHEN METABOLIC PANEL: CPT | Mod: 91 | Performed by: STUDENT IN AN ORGANIZED HEALTH CARE EDUCATION/TRAINING PROGRAM

## 2021-10-28 PROCEDURE — G0378 HOSPITAL OBSERVATION PER HR: HCPCS

## 2021-10-28 PROCEDURE — 84484 ASSAY OF TROPONIN QUANT: CPT | Performed by: SURGERY

## 2021-10-28 PROCEDURE — 85025 COMPLETE CBC W/AUTO DIFF WBC: CPT | Mod: 91 | Performed by: STUDENT IN AN ORGANIZED HEALTH CARE EDUCATION/TRAINING PROGRAM

## 2021-10-28 PROCEDURE — 85025 COMPLETE CBC W/AUTO DIFF WBC: CPT | Performed by: SURGERY

## 2021-10-28 PROCEDURE — 25000242 PHARM REV CODE 250 ALT 637 W/ HCPCS: Performed by: SURGERY

## 2021-10-28 PROCEDURE — 36415 COLL VENOUS BLD VENIPUNCTURE: CPT | Performed by: SURGERY

## 2021-10-28 PROCEDURE — 63600175 PHARM REV CODE 636 W HCPCS: Performed by: STUDENT IN AN ORGANIZED HEALTH CARE EDUCATION/TRAINING PROGRAM

## 2021-10-28 PROCEDURE — 99220 PR INITIAL OBSERVATION CARE,LEVL III: ICD-10-PCS | Mod: ,,, | Performed by: STUDENT IN AN ORGANIZED HEALTH CARE EDUCATION/TRAINING PROGRAM

## 2021-10-28 PROCEDURE — 25000003 PHARM REV CODE 250: Performed by: STUDENT IN AN ORGANIZED HEALTH CARE EDUCATION/TRAINING PROGRAM

## 2021-10-28 PROCEDURE — 93010 ELECTROCARDIOGRAM REPORT: CPT | Mod: ,,, | Performed by: INTERNAL MEDICINE

## 2021-10-28 PROCEDURE — 27000221 HC OXYGEN, UP TO 24 HOURS

## 2021-10-28 PROCEDURE — 93005 ELECTROCARDIOGRAM TRACING: CPT

## 2021-10-28 PROCEDURE — 80053 COMPREHEN METABOLIC PANEL: CPT | Performed by: SURGERY

## 2021-10-28 PROCEDURE — 25000003 PHARM REV CODE 250: Performed by: SURGERY

## 2021-10-28 PROCEDURE — 99220 PR INITIAL OBSERVATION CARE,LEVL III: CPT | Mod: ,,, | Performed by: STUDENT IN AN ORGANIZED HEALTH CARE EDUCATION/TRAINING PROGRAM

## 2021-10-28 PROCEDURE — 94761 N-INVAS EAR/PLS OXIMETRY MLT: CPT

## 2021-10-28 PROCEDURE — 96372 THER/PROPH/DIAG INJ SC/IM: CPT

## 2021-10-28 PROCEDURE — 82553 CREATINE MB FRACTION: CPT | Performed by: SURGERY

## 2021-10-28 PROCEDURE — 63600175 PHARM REV CODE 636 W HCPCS: Performed by: SURGERY

## 2021-10-28 PROCEDURE — C9399 UNCLASSIFIED DRUGS OR BIOLOG: HCPCS | Performed by: STUDENT IN AN ORGANIZED HEALTH CARE EDUCATION/TRAINING PROGRAM

## 2021-10-28 PROCEDURE — 85018 HEMOGLOBIN: CPT | Mod: 91 | Performed by: SURGERY

## 2021-10-28 PROCEDURE — 96376 TX/PRO/DX INJ SAME DRUG ADON: CPT

## 2021-10-28 PROCEDURE — 83880 ASSAY OF NATRIURETIC PEPTIDE: CPT | Performed by: SURGERY

## 2021-10-28 PROCEDURE — 36415 COLL VENOUS BLD VENIPUNCTURE: CPT | Performed by: STUDENT IN AN ORGANIZED HEALTH CARE EDUCATION/TRAINING PROGRAM

## 2021-10-28 PROCEDURE — 93010 EKG 12-LEAD: ICD-10-PCS | Mod: ,,, | Performed by: INTERNAL MEDICINE

## 2021-10-28 RX ORDER — METOPROLOL TARTRATE 50 MG/1
100 TABLET ORAL 2 TIMES DAILY
Status: DISCONTINUED | OUTPATIENT
Start: 2021-10-28 | End: 2021-10-29 | Stop reason: HOSPADM

## 2021-10-28 RX ORDER — IBUPROFEN 200 MG
16 TABLET ORAL
Status: DISCONTINUED | OUTPATIENT
Start: 2021-10-28 | End: 2021-10-29 | Stop reason: HOSPADM

## 2021-10-28 RX ORDER — INSULIN ASPART 100 [IU]/ML
0-5 INJECTION, SOLUTION INTRAVENOUS; SUBCUTANEOUS
Status: DISCONTINUED | OUTPATIENT
Start: 2021-10-28 | End: 2021-10-29 | Stop reason: HOSPADM

## 2021-10-28 RX ORDER — INSULIN ASPART 100 [IU]/ML
8 INJECTION, SOLUTION INTRAVENOUS; SUBCUTANEOUS
Status: DISCONTINUED | OUTPATIENT
Start: 2021-10-28 | End: 2021-10-28

## 2021-10-28 RX ORDER — MONTELUKAST SODIUM 10 MG/1
10 TABLET ORAL DAILY
Status: DISCONTINUED | OUTPATIENT
Start: 2021-10-28 | End: 2021-10-29 | Stop reason: HOSPADM

## 2021-10-28 RX ORDER — FUROSEMIDE 10 MG/ML
20 INJECTION INTRAMUSCULAR; INTRAVENOUS
Status: DISCONTINUED | OUTPATIENT
Start: 2021-10-28 | End: 2021-10-29

## 2021-10-28 RX ORDER — SPIRONOLACTONE 25 MG/1
25 TABLET ORAL DAILY
Status: DISCONTINUED | OUTPATIENT
Start: 2021-10-28 | End: 2021-10-28

## 2021-10-28 RX ORDER — METOLAZONE 2.5 MG/1
5 TABLET ORAL
Status: DISCONTINUED | OUTPATIENT
Start: 2021-11-01 | End: 2021-10-29 | Stop reason: HOSPADM

## 2021-10-28 RX ORDER — SODIUM CHLORIDE 0.9 % (FLUSH) 0.9 %
10 SYRINGE (ML) INJECTION
Status: DISCONTINUED | OUTPATIENT
Start: 2021-10-28 | End: 2021-10-29 | Stop reason: HOSPADM

## 2021-10-28 RX ORDER — ATORVASTATIN CALCIUM 40 MG/1
40 TABLET, FILM COATED ORAL NIGHTLY
Status: DISCONTINUED | OUTPATIENT
Start: 2021-10-28 | End: 2021-10-29 | Stop reason: HOSPADM

## 2021-10-28 RX ORDER — IBUPROFEN 200 MG
24 TABLET ORAL
Status: DISCONTINUED | OUTPATIENT
Start: 2021-10-28 | End: 2021-10-29 | Stop reason: HOSPADM

## 2021-10-28 RX ORDER — LEVALBUTEROL 1.25 MG/.5ML
1.25 SOLUTION, CONCENTRATE RESPIRATORY (INHALATION) EVERY 6 HOURS PRN
Status: DISCONTINUED | OUTPATIENT
Start: 2021-10-28 | End: 2021-10-29 | Stop reason: HOSPADM

## 2021-10-28 RX ORDER — ONDANSETRON 2 MG/ML
4 INJECTION INTRAMUSCULAR; INTRAVENOUS EVERY 8 HOURS PRN
Status: DISCONTINUED | OUTPATIENT
Start: 2021-10-28 | End: 2021-10-29 | Stop reason: HOSPADM

## 2021-10-28 RX ORDER — MORPHINE SULFATE 2 MG/ML
2 INJECTION, SOLUTION INTRAMUSCULAR; INTRAVENOUS EVERY 4 HOURS PRN
Status: DISCONTINUED | OUTPATIENT
Start: 2021-10-28 | End: 2021-10-29 | Stop reason: HOSPADM

## 2021-10-28 RX ORDER — TALC
6 POWDER (GRAM) TOPICAL NIGHTLY PRN
Status: DISCONTINUED | OUTPATIENT
Start: 2021-10-28 | End: 2021-10-29 | Stop reason: HOSPADM

## 2021-10-28 RX ORDER — GLUCAGON 1 MG
1 KIT INJECTION
Status: DISCONTINUED | OUTPATIENT
Start: 2021-10-28 | End: 2021-10-29 | Stop reason: HOSPADM

## 2021-10-28 RX ORDER — LANOLIN ALCOHOL/MO/W.PET/CERES
1 CREAM (GRAM) TOPICAL DAILY
Status: DISCONTINUED | OUTPATIENT
Start: 2021-10-29 | End: 2021-10-29 | Stop reason: HOSPADM

## 2021-10-28 RX ORDER — PANTOPRAZOLE SODIUM 40 MG/1
40 TABLET, DELAYED RELEASE ORAL DAILY
Status: DISCONTINUED | OUTPATIENT
Start: 2021-10-29 | End: 2021-10-29 | Stop reason: HOSPADM

## 2021-10-28 RX ADMIN — MONTELUKAST 10 MG: 10 TABLET, FILM COATED ORAL at 07:10

## 2021-10-28 RX ADMIN — APIXABAN 5 MG: 5 TABLET, FILM COATED ORAL at 09:10

## 2021-10-28 RX ADMIN — TIOTROPIUM BROMIDE INHALATION SPRAY 2 PUFF: 3.12 SPRAY, METERED RESPIRATORY (INHALATION) at 09:10

## 2021-10-28 RX ADMIN — ATORVASTATIN CALCIUM 40 MG: 40 TABLET, FILM COATED ORAL at 09:10

## 2021-10-28 RX ADMIN — LEVALBUTEROL 1.25 MG: 1.25 SOLUTION, CONCENTRATE RESPIRATORY (INHALATION) at 07:10

## 2021-10-28 RX ADMIN — SACUBITRIL AND VALSARTAN 1 TABLET: 24; 26 TABLET, FILM COATED ORAL at 10:10

## 2021-10-28 RX ADMIN — SPIRONOLACTONE 25 MG: 25 TABLET ORAL at 08:10

## 2021-10-28 RX ADMIN — METOPROLOL TARTRATE 100 MG: 50 TABLET, FILM COATED ORAL at 07:10

## 2021-10-28 RX ADMIN — FUROSEMIDE 20 MG: 20 INJECTION, SOLUTION INTRAMUSCULAR; INTRAVENOUS at 03:10

## 2021-10-28 RX ADMIN — MELATONIN TAB 3 MG 6 MG: 3 TAB at 09:10

## 2021-10-28 RX ADMIN — INSULIN DETEMIR 10 UNITS: 100 INJECTION, SOLUTION SUBCUTANEOUS at 09:10

## 2021-10-28 RX ADMIN — MORPHINE SULFATE 2 MG: 2 INJECTION, SOLUTION INTRAMUSCULAR; INTRAVENOUS at 09:10

## 2021-10-28 RX ADMIN — METOPROLOL TARTRATE 100 MG: 50 TABLET, FILM COATED ORAL at 09:10

## 2021-10-29 VITALS
RESPIRATION RATE: 18 BRPM | HEIGHT: 66 IN | OXYGEN SATURATION: 90 % | HEART RATE: 71 BPM | TEMPERATURE: 97 F | SYSTOLIC BLOOD PRESSURE: 126 MMHG | DIASTOLIC BLOOD PRESSURE: 83 MMHG | WEIGHT: 225.75 LBS | BODY MASS INDEX: 36.28 KG/M2

## 2021-10-29 LAB
ALBUMIN SERPL BCP-MCNC: 3.2 G/DL (ref 3.5–5.2)
ALBUMIN SERPL BCP-MCNC: 3.2 G/DL (ref 3.5–5.2)
ALP SERPL-CCNC: 47 U/L (ref 55–135)
ALP SERPL-CCNC: 47 U/L (ref 55–135)
ALT SERPL W/O P-5'-P-CCNC: 32 U/L (ref 10–44)
ALT SERPL W/O P-5'-P-CCNC: 32 U/L (ref 10–44)
ANION GAP SERPL CALC-SCNC: 12 MMOL/L (ref 8–16)
ANION GAP SERPL CALC-SCNC: 12 MMOL/L (ref 8–16)
ANISOCYTOSIS BLD QL SMEAR: ABNORMAL
ANISOCYTOSIS BLD QL SMEAR: ABNORMAL
AST SERPL-CCNC: 19 U/L (ref 10–40)
AST SERPL-CCNC: 19 U/L (ref 10–40)
BASOPHILS # BLD AUTO: 0.07 K/UL (ref 0–0.2)
BASOPHILS # BLD AUTO: 0.07 K/UL (ref 0–0.2)
BASOPHILS NFR BLD: 0.9 % (ref 0–1.9)
BASOPHILS NFR BLD: 0.9 % (ref 0–1.9)
BILIRUB SERPL-MCNC: 1.5 MG/DL (ref 0.1–1)
BILIRUB SERPL-MCNC: 1.5 MG/DL (ref 0.1–1)
BUN SERPL-MCNC: 66 MG/DL (ref 6–20)
BUN SERPL-MCNC: 66 MG/DL (ref 6–20)
CALCIUM SERPL-MCNC: 8.9 MG/DL (ref 8.7–10.5)
CALCIUM SERPL-MCNC: 8.9 MG/DL (ref 8.7–10.5)
CHLORIDE SERPL-SCNC: 105 MMOL/L (ref 95–110)
CHLORIDE SERPL-SCNC: 105 MMOL/L (ref 95–110)
CO2 SERPL-SCNC: 24 MMOL/L (ref 23–29)
CO2 SERPL-SCNC: 24 MMOL/L (ref 23–29)
CREAT SERPL-MCNC: 2.3 MG/DL (ref 0.5–1.4)
CREAT SERPL-MCNC: 2.3 MG/DL (ref 0.5–1.4)
DACRYOCYTES BLD QL SMEAR: ABNORMAL
DACRYOCYTES BLD QL SMEAR: ABNORMAL
DIFFERENTIAL METHOD: ABNORMAL
DIFFERENTIAL METHOD: ABNORMAL
EOSINOPHIL # BLD AUTO: 0.1 K/UL (ref 0–0.5)
EOSINOPHIL # BLD AUTO: 0.1 K/UL (ref 0–0.5)
EOSINOPHIL NFR BLD: 1.7 % (ref 0–8)
EOSINOPHIL NFR BLD: 1.7 % (ref 0–8)
ERYTHROCYTE [DISTWIDTH] IN BLOOD BY AUTOMATED COUNT: 29.9 % (ref 11.5–14.5)
ERYTHROCYTE [DISTWIDTH] IN BLOOD BY AUTOMATED COUNT: 29.9 % (ref 11.5–14.5)
EST. GFR  (AFRICAN AMERICAN): 27 ML/MIN/1.73 M^2
EST. GFR  (AFRICAN AMERICAN): 27 ML/MIN/1.73 M^2
EST. GFR  (NON AFRICAN AMERICAN): 23 ML/MIN/1.73 M^2
EST. GFR  (NON AFRICAN AMERICAN): 23 ML/MIN/1.73 M^2
GLUCOSE SERPL-MCNC: 108 MG/DL (ref 70–110)
GLUCOSE SERPL-MCNC: 108 MG/DL (ref 70–110)
HCT VFR BLD AUTO: 35.2 % (ref 37–48.5)
HCT VFR BLD AUTO: 35.2 % (ref 37–48.5)
HGB BLD-MCNC: 10 G/DL (ref 12–16)
HGB BLD-MCNC: 10.1 G/DL (ref 12–16)
HGB BLD-MCNC: 9.9 G/DL (ref 12–16)
HYPOCHROMIA BLD QL SMEAR: ABNORMAL
HYPOCHROMIA BLD QL SMEAR: ABNORMAL
IMM GRANULOCYTES # BLD AUTO: 0.1 K/UL (ref 0–0.04)
IMM GRANULOCYTES # BLD AUTO: 0.1 K/UL (ref 0–0.04)
IMM GRANULOCYTES NFR BLD AUTO: 1.2 % (ref 0–0.5)
IMM GRANULOCYTES NFR BLD AUTO: 1.2 % (ref 0–0.5)
LYMPHOCYTES # BLD AUTO: 1.7 K/UL (ref 1–4.8)
LYMPHOCYTES # BLD AUTO: 1.7 K/UL (ref 1–4.8)
LYMPHOCYTES NFR BLD: 21.2 % (ref 18–48)
LYMPHOCYTES NFR BLD: 21.2 % (ref 18–48)
MCH RBC QN AUTO: 17.3 PG (ref 27–31)
MCH RBC QN AUTO: 17.3 PG (ref 27–31)
MCHC RBC AUTO-ENTMCNC: 28.4 G/DL (ref 32–36)
MCHC RBC AUTO-ENTMCNC: 28.4 G/DL (ref 32–36)
MCV RBC AUTO: 61 FL (ref 82–98)
MCV RBC AUTO: 61 FL (ref 82–98)
MONOCYTES # BLD AUTO: 0.7 K/UL (ref 0.3–1)
MONOCYTES # BLD AUTO: 0.7 K/UL (ref 0.3–1)
MONOCYTES NFR BLD: 8.1 % (ref 4–15)
MONOCYTES NFR BLD: 8.1 % (ref 4–15)
NEUTROPHILS # BLD AUTO: 5.4 K/UL (ref 1.8–7.7)
NEUTROPHILS # BLD AUTO: 5.4 K/UL (ref 1.8–7.7)
NEUTROPHILS NFR BLD: 66.9 % (ref 38–73)
NEUTROPHILS NFR BLD: 66.9 % (ref 38–73)
NRBC BLD-RTO: 8 /100 WBC
NRBC BLD-RTO: 8 /100 WBC
OVALOCYTES BLD QL SMEAR: ABNORMAL
OVALOCYTES BLD QL SMEAR: ABNORMAL
PLATELET # BLD AUTO: 192 K/UL (ref 150–450)
PLATELET # BLD AUTO: 192 K/UL (ref 150–450)
PMV BLD AUTO: ABNORMAL FL (ref 9.2–12.9)
PMV BLD AUTO: ABNORMAL FL (ref 9.2–12.9)
POCT GLUCOSE: 107 MG/DL (ref 70–110)
POCT GLUCOSE: 144 MG/DL (ref 70–110)
POIKILOCYTOSIS BLD QL SMEAR: ABNORMAL
POIKILOCYTOSIS BLD QL SMEAR: ABNORMAL
POLYCHROMASIA BLD QL SMEAR: ABNORMAL
POLYCHROMASIA BLD QL SMEAR: ABNORMAL
POTASSIUM SERPL-SCNC: 4.7 MMOL/L (ref 3.5–5.1)
POTASSIUM SERPL-SCNC: 5.8 MMOL/L (ref 3.5–5.1)
POTASSIUM SERPL-SCNC: 5.8 MMOL/L (ref 3.5–5.1)
PROT SERPL-MCNC: 5.8 G/DL (ref 6–8.4)
PROT SERPL-MCNC: 5.8 G/DL (ref 6–8.4)
RBC # BLD AUTO: 5.77 M/UL (ref 4–5.4)
RBC # BLD AUTO: 5.77 M/UL (ref 4–5.4)
SCHISTOCYTES BLD QL SMEAR: PRESENT
SCHISTOCYTES BLD QL SMEAR: PRESENT
SODIUM SERPL-SCNC: 141 MMOL/L (ref 136–145)
SODIUM SERPL-SCNC: 141 MMOL/L (ref 136–145)
SPHEROCYTES BLD QL SMEAR: ABNORMAL
SPHEROCYTES BLD QL SMEAR: ABNORMAL
TARGETS BLD QL SMEAR: ABNORMAL
TARGETS BLD QL SMEAR: ABNORMAL
TROPONIN I SERPL DL<=0.01 NG/ML-MCNC: 0.64 NG/ML (ref 0–0.03)
TROPONIN I SERPL DL<=0.01 NG/ML-MCNC: 0.64 NG/ML (ref 0–0.03)
TROPONIN I SERPL DL<=0.01 NG/ML-MCNC: 0.66 NG/ML (ref 0–0.03)
WBC # BLD AUTO: 8.01 K/UL (ref 3.9–12.7)
WBC # BLD AUTO: 8.01 K/UL (ref 3.9–12.7)

## 2021-10-29 PROCEDURE — 94760 N-INVAS EAR/PLS OXIMETRY 1: CPT

## 2021-10-29 PROCEDURE — 85025 COMPLETE CBC W/AUTO DIFF WBC: CPT | Performed by: STUDENT IN AN ORGANIZED HEALTH CARE EDUCATION/TRAINING PROGRAM

## 2021-10-29 PROCEDURE — 25000242 PHARM REV CODE 250 ALT 637 W/ HCPCS: Performed by: SURGERY

## 2021-10-29 PROCEDURE — 85018 HEMOGLOBIN: CPT | Mod: 91 | Performed by: SURGERY

## 2021-10-29 PROCEDURE — 63600175 PHARM REV CODE 636 W HCPCS: Performed by: SURGERY

## 2021-10-29 PROCEDURE — 25000003 PHARM REV CODE 250: Performed by: NURSE PRACTITIONER

## 2021-10-29 PROCEDURE — 94640 AIRWAY INHALATION TREATMENT: CPT

## 2021-10-29 PROCEDURE — 36415 COLL VENOUS BLD VENIPUNCTURE: CPT | Performed by: SURGERY

## 2021-10-29 PROCEDURE — 25000003 PHARM REV CODE 250: Performed by: STUDENT IN AN ORGANIZED HEALTH CARE EDUCATION/TRAINING PROGRAM

## 2021-10-29 PROCEDURE — 84484 ASSAY OF TROPONIN QUANT: CPT | Performed by: SURGERY

## 2021-10-29 PROCEDURE — 85018 HEMOGLOBIN: CPT | Performed by: SURGERY

## 2021-10-29 PROCEDURE — G0378 HOSPITAL OBSERVATION PER HR: HCPCS

## 2021-10-29 PROCEDURE — 99225 PR SUBSEQUENT OBSERVATION CARE,LEVEL II: CPT | Mod: ,,, | Performed by: NURSE PRACTITIONER

## 2021-10-29 PROCEDURE — 96376 TX/PRO/DX INJ SAME DRUG ADON: CPT

## 2021-10-29 PROCEDURE — 84132 ASSAY OF SERUM POTASSIUM: CPT | Performed by: NURSE PRACTITIONER

## 2021-10-29 PROCEDURE — 80053 COMPREHEN METABOLIC PANEL: CPT | Performed by: STUDENT IN AN ORGANIZED HEALTH CARE EDUCATION/TRAINING PROGRAM

## 2021-10-29 PROCEDURE — 36415 COLL VENOUS BLD VENIPUNCTURE: CPT | Performed by: STUDENT IN AN ORGANIZED HEALTH CARE EDUCATION/TRAINING PROGRAM

## 2021-10-29 PROCEDURE — 99225 PR SUBSEQUENT OBSERVATION CARE,LEVEL II: ICD-10-PCS | Mod: ,,, | Performed by: NURSE PRACTITIONER

## 2021-10-29 PROCEDURE — 25000003 PHARM REV CODE 250: Performed by: SURGERY

## 2021-10-29 PROCEDURE — 27000221 HC OXYGEN, UP TO 24 HOURS

## 2021-10-29 PROCEDURE — 84484 ASSAY OF TROPONIN QUANT: CPT | Mod: 91 | Performed by: SURGERY

## 2021-10-29 PROCEDURE — 93005 ELECTROCARDIOGRAM TRACING: CPT

## 2021-10-29 RX ORDER — FUROSEMIDE 20 MG/1
20 TABLET ORAL DAILY
Qty: 30 TABLET | Refills: 0 | Status: ON HOLD | OUTPATIENT
Start: 2021-10-29 | End: 2021-11-02 | Stop reason: SDUPTHER

## 2021-10-29 RX ADMIN — FERROUS SULFATE TAB 325 MG (65 MG ELEMENTAL FE) 1 EACH: 325 (65 FE) TAB at 11:10

## 2021-10-29 RX ADMIN — APIXABAN 5 MG: 5 TABLET, FILM COATED ORAL at 08:10

## 2021-10-29 RX ADMIN — PANTOPRAZOLE SODIUM 40 MG: 40 TABLET, DELAYED RELEASE ORAL at 08:10

## 2021-10-29 RX ADMIN — MONTELUKAST 10 MG: 10 TABLET, FILM COATED ORAL at 11:10

## 2021-10-29 RX ADMIN — FUROSEMIDE 20 MG: 20 INJECTION, SOLUTION INTRAMUSCULAR; INTRAVENOUS at 03:10

## 2021-10-29 RX ADMIN — METOPROLOL TARTRATE 100 MG: 50 TABLET, FILM COATED ORAL at 08:10

## 2021-10-29 RX ADMIN — LEVALBUTEROL 1.25 MG: 1.25 SOLUTION, CONCENTRATE RESPIRATORY (INHALATION) at 07:10

## 2021-10-29 RX ADMIN — TIOTROPIUM BROMIDE INHALATION SPRAY 2 PUFF: 3.12 SPRAY, METERED RESPIRATORY (INHALATION) at 07:10

## 2021-10-29 RX ADMIN — SODIUM ZIRCONIUM CYCLOSILICATE 5 G: 5 POWDER, FOR SUSPENSION ORAL at 11:10

## 2021-10-31 ENCOUNTER — HOSPITAL ENCOUNTER (INPATIENT)
Facility: HOSPITAL | Age: 56
LOS: 2 days | Discharge: HOME | DRG: 291 | End: 2021-11-02
Attending: STUDENT IN AN ORGANIZED HEALTH CARE EDUCATION/TRAINING PROGRAM | Admitting: INTERNAL MEDICINE
Payer: MEDICAID

## 2021-10-31 DIAGNOSIS — R79.89 TROPONIN LEVEL ELEVATED: ICD-10-CM

## 2021-10-31 DIAGNOSIS — I50.9 ACUTE ON CHRONIC CONGESTIVE HEART FAILURE, UNSPECIFIED HEART FAILURE TYPE: Primary | ICD-10-CM

## 2021-10-31 DIAGNOSIS — N18.9 CHRONIC KIDNEY DISEASE, UNSPECIFIED CKD STAGE: ICD-10-CM

## 2021-10-31 DIAGNOSIS — R06.02 SHORTNESS OF BREATH: ICD-10-CM

## 2021-10-31 DIAGNOSIS — R79.89 ELEVATED BRAIN NATRIURETIC PEPTIDE (BNP) LEVEL: ICD-10-CM

## 2021-10-31 DIAGNOSIS — R07.9 CHEST PAIN: ICD-10-CM

## 2021-10-31 DIAGNOSIS — R79.89 ELEVATED TROPONIN: ICD-10-CM

## 2021-10-31 DIAGNOSIS — J81.0 ACUTE PULMONARY EDEMA: ICD-10-CM

## 2021-10-31 DIAGNOSIS — I50.43 ACUTE ON CHRONIC COMBINED SYSTOLIC AND DIASTOLIC CONGESTIVE HEART FAILURE: ICD-10-CM

## 2021-10-31 LAB
ACANTHOCYTES BLD QL SMEAR: PRESENT
ALBUMIN SERPL BCP-MCNC: 3.4 G/DL (ref 3.5–5.2)
ALP SERPL-CCNC: 52 U/L (ref 55–135)
ALT SERPL W/O P-5'-P-CCNC: 28 U/L (ref 10–44)
ANION GAP SERPL CALC-SCNC: 12 MMOL/L (ref 8–16)
ANISOCYTOSIS BLD QL SMEAR: ABNORMAL
AST SERPL-CCNC: 21 U/L (ref 10–40)
BASOPHILS # BLD AUTO: 0.03 K/UL (ref 0–0.2)
BASOPHILS NFR BLD: 0.4 % (ref 0–1.9)
BILIRUB SERPL-MCNC: 3.2 MG/DL (ref 0.1–1)
BNP SERPL-MCNC: >4900 PG/ML (ref 0–99)
BUN SERPL-MCNC: 60 MG/DL (ref 6–20)
CALCIUM SERPL-MCNC: 10 MG/DL (ref 8.7–10.5)
CHLORIDE SERPL-SCNC: 106 MMOL/L (ref 95–110)
CO2 SERPL-SCNC: 25 MMOL/L (ref 23–29)
CREAT SERPL-MCNC: 1.9 MG/DL (ref 0.5–1.4)
DACRYOCYTES BLD QL SMEAR: ABNORMAL
DIFFERENTIAL METHOD: ABNORMAL
EOSINOPHIL # BLD AUTO: 0.1 K/UL (ref 0–0.5)
EOSINOPHIL NFR BLD: 0.7 % (ref 0–8)
ERYTHROCYTE [DISTWIDTH] IN BLOOD BY AUTOMATED COUNT: 30.7 % (ref 11.5–14.5)
EST. GFR  (AFRICAN AMERICAN): 33 ML/MIN/1.73 M^2
EST. GFR  (NON AFRICAN AMERICAN): 29 ML/MIN/1.73 M^2
GLUCOSE SERPL-MCNC: 146 MG/DL (ref 70–110)
HCT VFR BLD AUTO: 36.8 % (ref 37–48.5)
HGB BLD-MCNC: 10.8 G/DL (ref 12–16)
HYPOCHROMIA BLD QL SMEAR: ABNORMAL
IMM GRANULOCYTES # BLD AUTO: 0.12 K/UL (ref 0–0.04)
IMM GRANULOCYTES NFR BLD AUTO: 1.4 % (ref 0–0.5)
INFLUENZA A, MOLECULAR: NEGATIVE
INFLUENZA B, MOLECULAR: NEGATIVE
LYMPHOCYTES # BLD AUTO: 1.1 K/UL (ref 1–4.8)
LYMPHOCYTES NFR BLD: 13 % (ref 18–48)
MCH RBC QN AUTO: 17.6 PG (ref 27–31)
MCHC RBC AUTO-ENTMCNC: 29.3 G/DL (ref 32–36)
MCV RBC AUTO: 60 FL (ref 82–98)
MONOCYTES # BLD AUTO: 0.5 K/UL (ref 0.3–1)
MONOCYTES NFR BLD: 6.3 % (ref 4–15)
NEUTROPHILS # BLD AUTO: 6.5 K/UL (ref 1.8–7.7)
NEUTROPHILS NFR BLD: 78.2 % (ref 38–73)
NRBC BLD-RTO: 7 /100 WBC
OVALOCYTES BLD QL SMEAR: ABNORMAL
PLATELET # BLD AUTO: 212 K/UL (ref 150–450)
PLATELET BLD QL SMEAR: ABNORMAL
PMV BLD AUTO: ABNORMAL FL (ref 9.2–12.9)
POCT GLUCOSE: 142 MG/DL (ref 70–110)
POCT GLUCOSE: 149 MG/DL (ref 70–110)
POIKILOCYTOSIS BLD QL SMEAR: ABNORMAL
POLYCHROMASIA BLD QL SMEAR: ABNORMAL
POTASSIUM SERPL-SCNC: 4.9 MMOL/L (ref 3.5–5.1)
PROCALCITONIN SERPL IA-MCNC: 0.2 NG/ML
PROT SERPL-MCNC: 6.5 G/DL (ref 6–8.4)
RBC # BLD AUTO: 6.14 M/UL (ref 4–5.4)
SARS-COV-2 RDRP RESP QL NAA+PROBE: NEGATIVE
SCHISTOCYTES BLD QL SMEAR: PRESENT
SODIUM SERPL-SCNC: 143 MMOL/L (ref 136–145)
SPECIMEN SOURCE: NORMAL
SPHEROCYTES BLD QL SMEAR: ABNORMAL
TARGETS BLD QL SMEAR: ABNORMAL
TROPONIN I SERPL DL<=0.01 NG/ML-MCNC: 0.8 NG/ML (ref 0–0.03)
TROPONIN I SERPL DL<=0.01 NG/ML-MCNC: 0.83 NG/ML (ref 0–0.03)
TROPONIN I SERPL DL<=0.01 NG/ML-MCNC: 0.89 NG/ML (ref 0–0.03)
WBC # BLD AUTO: 8.29 K/UL (ref 3.9–12.7)

## 2021-10-31 PROCEDURE — 96375 TX/PRO/DX INJ NEW DRUG ADDON: CPT

## 2021-10-31 PROCEDURE — 99285 EMERGENCY DEPT VISIT HI MDM: CPT | Mod: 25

## 2021-10-31 PROCEDURE — U0002 COVID-19 LAB TEST NON-CDC: HCPCS | Performed by: STUDENT IN AN ORGANIZED HEALTH CARE EDUCATION/TRAINING PROGRAM

## 2021-10-31 PROCEDURE — 36415 COLL VENOUS BLD VENIPUNCTURE: CPT | Performed by: INTERNAL MEDICINE

## 2021-10-31 PROCEDURE — 27000221 HC OXYGEN, UP TO 24 HOURS

## 2021-10-31 PROCEDURE — 80053 COMPREHEN METABOLIC PANEL: CPT | Performed by: STUDENT IN AN ORGANIZED HEALTH CARE EDUCATION/TRAINING PROGRAM

## 2021-10-31 PROCEDURE — 99900035 HC TECH TIME PER 15 MIN (STAT)

## 2021-10-31 PROCEDURE — 83880 ASSAY OF NATRIURETIC PEPTIDE: CPT | Performed by: STUDENT IN AN ORGANIZED HEALTH CARE EDUCATION/TRAINING PROGRAM

## 2021-10-31 PROCEDURE — 94640 AIRWAY INHALATION TREATMENT: CPT

## 2021-10-31 PROCEDURE — 63600175 PHARM REV CODE 636 W HCPCS: Performed by: INTERNAL MEDICINE

## 2021-10-31 PROCEDURE — 11000001 HC ACUTE MED/SURG PRIVATE ROOM

## 2021-10-31 PROCEDURE — 63600175 PHARM REV CODE 636 W HCPCS: Performed by: STUDENT IN AN ORGANIZED HEALTH CARE EDUCATION/TRAINING PROGRAM

## 2021-10-31 PROCEDURE — 96374 THER/PROPH/DIAG INJ IV PUSH: CPT

## 2021-10-31 PROCEDURE — 25000242 PHARM REV CODE 250 ALT 637 W/ HCPCS: Performed by: STUDENT IN AN ORGANIZED HEALTH CARE EDUCATION/TRAINING PROGRAM

## 2021-10-31 PROCEDURE — 94760 N-INVAS EAR/PLS OXIMETRY 1: CPT

## 2021-10-31 PROCEDURE — 25000003 PHARM REV CODE 250: Performed by: INTERNAL MEDICINE

## 2021-10-31 PROCEDURE — 87502 INFLUENZA DNA AMP PROBE: CPT | Performed by: STUDENT IN AN ORGANIZED HEALTH CARE EDUCATION/TRAINING PROGRAM

## 2021-10-31 PROCEDURE — 85025 COMPLETE CBC W/AUTO DIFF WBC: CPT | Performed by: STUDENT IN AN ORGANIZED HEALTH CARE EDUCATION/TRAINING PROGRAM

## 2021-10-31 PROCEDURE — 93005 ELECTROCARDIOGRAM TRACING: CPT

## 2021-10-31 PROCEDURE — 36415 COLL VENOUS BLD VENIPUNCTURE: CPT | Performed by: STUDENT IN AN ORGANIZED HEALTH CARE EDUCATION/TRAINING PROGRAM

## 2021-10-31 PROCEDURE — 25000003 PHARM REV CODE 250: Performed by: STUDENT IN AN ORGANIZED HEALTH CARE EDUCATION/TRAINING PROGRAM

## 2021-10-31 PROCEDURE — 84484 ASSAY OF TROPONIN QUANT: CPT | Performed by: STUDENT IN AN ORGANIZED HEALTH CARE EDUCATION/TRAINING PROGRAM

## 2021-10-31 PROCEDURE — 84484 ASSAY OF TROPONIN QUANT: CPT | Mod: 91 | Performed by: INTERNAL MEDICINE

## 2021-10-31 PROCEDURE — 84145 PROCALCITONIN (PCT): CPT | Performed by: STUDENT IN AN ORGANIZED HEALTH CARE EDUCATION/TRAINING PROGRAM

## 2021-10-31 RX ORDER — GLUCAGON 1 MG
1 KIT INJECTION
Status: DISCONTINUED | OUTPATIENT
Start: 2021-10-31 | End: 2021-11-02 | Stop reason: HOSPADM

## 2021-10-31 RX ORDER — METOPROLOL SUCCINATE 50 MG/1
100 TABLET, EXTENDED RELEASE ORAL 2 TIMES DAILY
Status: DISCONTINUED | OUTPATIENT
Start: 2021-10-31 | End: 2021-11-02 | Stop reason: HOSPADM

## 2021-10-31 RX ORDER — ATORVASTATIN CALCIUM 40 MG/1
40 TABLET, FILM COATED ORAL DAILY
Status: DISCONTINUED | OUTPATIENT
Start: 2021-10-31 | End: 2021-11-02 | Stop reason: HOSPADM

## 2021-10-31 RX ORDER — TALC
6 POWDER (GRAM) TOPICAL NIGHTLY PRN
Status: DISCONTINUED | OUTPATIENT
Start: 2021-10-31 | End: 2021-11-02 | Stop reason: HOSPADM

## 2021-10-31 RX ORDER — IBUPROFEN 200 MG
24 TABLET ORAL
Status: DISCONTINUED | OUTPATIENT
Start: 2021-10-31 | End: 2021-11-02 | Stop reason: HOSPADM

## 2021-10-31 RX ORDER — ONDANSETRON 8 MG/1
8 TABLET, ORALLY DISINTEGRATING ORAL EVERY 8 HOURS PRN
Status: DISCONTINUED | OUTPATIENT
Start: 2021-10-31 | End: 2021-11-02 | Stop reason: HOSPADM

## 2021-10-31 RX ORDER — FUROSEMIDE 10 MG/ML
80 INJECTION INTRAMUSCULAR; INTRAVENOUS
Status: COMPLETED | OUTPATIENT
Start: 2021-10-31 | End: 2021-10-31

## 2021-10-31 RX ORDER — ALPRAZOLAM 0.5 MG/1
0.5 TABLET ORAL ONCE
Status: COMPLETED | OUTPATIENT
Start: 2021-10-31 | End: 2021-10-31

## 2021-10-31 RX ORDER — SODIUM CHLORIDE 0.9 % (FLUSH) 0.9 %
10 SYRINGE (ML) INJECTION
Status: DISCONTINUED | OUTPATIENT
Start: 2021-10-31 | End: 2021-11-02 | Stop reason: HOSPADM

## 2021-10-31 RX ORDER — FUROSEMIDE 10 MG/ML
40 INJECTION INTRAMUSCULAR; INTRAVENOUS
Status: DISCONTINUED | OUTPATIENT
Start: 2021-10-31 | End: 2021-10-31

## 2021-10-31 RX ORDER — IBUPROFEN 200 MG
16 TABLET ORAL
Status: DISCONTINUED | OUTPATIENT
Start: 2021-10-31 | End: 2021-11-02 | Stop reason: HOSPADM

## 2021-10-31 RX ORDER — ROPINIROLE 1 MG/1
4 TABLET, FILM COATED ORAL DAILY
Status: DISCONTINUED | OUTPATIENT
Start: 2021-10-31 | End: 2021-11-02 | Stop reason: HOSPADM

## 2021-10-31 RX ORDER — ONDANSETRON 2 MG/ML
8 INJECTION INTRAMUSCULAR; INTRAVENOUS
Status: COMPLETED | OUTPATIENT
Start: 2021-10-31 | End: 2021-10-31

## 2021-10-31 RX ORDER — FUROSEMIDE 10 MG/ML
40 INJECTION INTRAMUSCULAR; INTRAVENOUS
Status: DISCONTINUED | OUTPATIENT
Start: 2021-10-31 | End: 2021-11-02 | Stop reason: HOSPADM

## 2021-10-31 RX ORDER — IPRATROPIUM BROMIDE AND ALBUTEROL SULFATE 2.5; .5 MG/3ML; MG/3ML
1 SOLUTION RESPIRATORY (INHALATION) EVERY 6 HOURS PRN
Status: DISCONTINUED | OUTPATIENT
Start: 2021-10-31 | End: 2021-10-31

## 2021-10-31 RX ORDER — INSULIN ASPART 100 [IU]/ML
1-10 INJECTION, SOLUTION INTRAVENOUS; SUBCUTANEOUS
Status: DISCONTINUED | OUTPATIENT
Start: 2021-10-31 | End: 2021-11-02 | Stop reason: HOSPADM

## 2021-10-31 RX ORDER — IBUPROFEN 600 MG/1
600 TABLET ORAL EVERY 6 HOURS PRN
Status: DISCONTINUED | OUTPATIENT
Start: 2021-10-31 | End: 2021-11-02 | Stop reason: HOSPADM

## 2021-10-31 RX ORDER — IPRATROPIUM BROMIDE AND ALBUTEROL SULFATE 2.5; .5 MG/3ML; MG/3ML
3 SOLUTION RESPIRATORY (INHALATION) EVERY 6 HOURS PRN
Status: DISCONTINUED | OUTPATIENT
Start: 2021-10-31 | End: 2021-11-02 | Stop reason: HOSPADM

## 2021-10-31 RX ADMIN — ONDANSETRON HYDROCHLORIDE 8 MG: 2 SOLUTION INTRAMUSCULAR; INTRAVENOUS at 10:10

## 2021-10-31 RX ADMIN — ATORVASTATIN CALCIUM 40 MG: 40 TABLET, FILM COATED ORAL at 05:10

## 2021-10-31 RX ADMIN — IPRATROPIUM BROMIDE AND ALBUTEROL SULFATE 1 ML: .5; 3 SOLUTION RESPIRATORY (INHALATION) at 09:10

## 2021-10-31 RX ADMIN — APIXABAN 5 MG: 5 TABLET, FILM COATED ORAL at 08:10

## 2021-10-31 RX ADMIN — MELATONIN TAB 3 MG 6 MG: 3 TAB at 09:10

## 2021-10-31 RX ADMIN — METOPROLOL SUCCINATE 100 MG: 50 TABLET, EXTENDED RELEASE ORAL at 09:10

## 2021-10-31 RX ADMIN — FUROSEMIDE 40 MG: 10 INJECTION, SOLUTION INTRAMUSCULAR; INTRAVENOUS at 09:10

## 2021-10-31 RX ADMIN — SACUBITRIL AND VALSARTAN 4 TABLET: 24; 26 TABLET, FILM COATED ORAL at 08:10

## 2021-10-31 RX ADMIN — FUROSEMIDE 80 MG: 10 INJECTION, SOLUTION INTRAMUSCULAR; INTRAVENOUS at 11:10

## 2021-10-31 RX ADMIN — ROPINIROLE HYDROCHLORIDE 4 MG: 1 TABLET, FILM COATED ORAL at 05:10

## 2021-10-31 RX ADMIN — ALPRAZOLAM 0.5 MG: 0.5 TABLET ORAL at 10:10

## 2021-11-01 LAB
ACANTHOCYTES BLD QL SMEAR: PRESENT
ALBUMIN SERPL BCP-MCNC: 3.2 G/DL (ref 3.5–5.2)
ALLENS TEST: ABNORMAL
ALP SERPL-CCNC: 46 U/L (ref 55–135)
ALT SERPL W/O P-5'-P-CCNC: 25 U/L (ref 10–44)
ANION GAP SERPL CALC-SCNC: 11 MMOL/L (ref 8–16)
ANION GAP SERPL CALC-SCNC: 13 MMOL/L (ref 8–16)
ANISOCYTOSIS BLD QL SMEAR: ABNORMAL
AST SERPL-CCNC: 21 U/L (ref 10–40)
BASOPHILS # BLD AUTO: 0.05 K/UL (ref 0–0.2)
BASOPHILS NFR BLD: 0.8 % (ref 0–1.9)
BILIRUB SERPL-MCNC: 2.7 MG/DL (ref 0.1–1)
BNP SERPL-MCNC: >4900 PG/ML (ref 0–99)
BUN SERPL-MCNC: 63 MG/DL (ref 6–20)
BUN SERPL-MCNC: 63 MG/DL (ref 6–20)
CALCIUM SERPL-MCNC: 9.1 MG/DL (ref 8.7–10.5)
CALCIUM SERPL-MCNC: 9.5 MG/DL (ref 8.7–10.5)
CHLORIDE SERPL-SCNC: 104 MMOL/L (ref 95–110)
CHLORIDE SERPL-SCNC: 105 MMOL/L (ref 95–110)
CO2 SERPL-SCNC: 23 MMOL/L (ref 23–29)
CO2 SERPL-SCNC: 24 MMOL/L (ref 23–29)
CREAT SERPL-MCNC: 1.9 MG/DL (ref 0.5–1.4)
CREAT SERPL-MCNC: 2 MG/DL (ref 0.5–1.4)
DACRYOCYTES BLD QL SMEAR: ABNORMAL
DELSYS: ABNORMAL
DIFFERENTIAL METHOD: ABNORMAL
EOSINOPHIL # BLD AUTO: 0.1 K/UL (ref 0–0.5)
EOSINOPHIL NFR BLD: 1.1 % (ref 0–8)
ERYTHROCYTE [DISTWIDTH] IN BLOOD BY AUTOMATED COUNT: 30.5 % (ref 11.5–14.5)
EST. GFR  (AFRICAN AMERICAN): 31 ML/MIN/1.73 M^2
EST. GFR  (AFRICAN AMERICAN): 33 ML/MIN/1.73 M^2
EST. GFR  (NON AFRICAN AMERICAN): 27 ML/MIN/1.73 M^2
EST. GFR  (NON AFRICAN AMERICAN): 29 ML/MIN/1.73 M^2
GLUCOSE SERPL-MCNC: 116 MG/DL (ref 70–110)
GLUCOSE SERPL-MCNC: 134 MG/DL (ref 70–110)
HCO3 UR-SCNC: 32.4 MMOL/L (ref 22–26)
HCT VFR BLD AUTO: 35.8 % (ref 37–48.5)
HGB BLD-MCNC: 10.1 G/DL (ref 12–16)
HYPOCHROMIA BLD QL SMEAR: ABNORMAL
IMM GRANULOCYTES # BLD AUTO: 0.1 K/UL (ref 0–0.04)
IMM GRANULOCYTES NFR BLD AUTO: 1.6 % (ref 0–0.5)
LYMPHOCYTES # BLD AUTO: 1 K/UL (ref 1–4.8)
LYMPHOCYTES NFR BLD: 16.1 % (ref 18–48)
MAGNESIUM SERPL-MCNC: 2.1 MG/DL (ref 1.6–2.6)
MCH RBC QN AUTO: 17.3 PG (ref 27–31)
MCHC RBC AUTO-ENTMCNC: 28.2 G/DL (ref 32–36)
MCV RBC AUTO: 61 FL (ref 82–98)
MONOCYTES # BLD AUTO: 0.6 K/UL (ref 0.3–1)
MONOCYTES NFR BLD: 9.8 % (ref 4–15)
NEUTROPHILS # BLD AUTO: 4.4 K/UL (ref 1.8–7.7)
NEUTROPHILS NFR BLD: 70.6 % (ref 38–73)
NRBC BLD-RTO: 11 /100 WBC
OVALOCYTES BLD QL SMEAR: ABNORMAL
PCO2 BLDA: 60 MMHG (ref 35–45)
PH SMN: 7.34 [PH] (ref 7.35–7.45)
PLATELET # BLD AUTO: 184 K/UL (ref 150–450)
PLATELET BLD QL SMEAR: ABNORMAL
PMV BLD AUTO: ABNORMAL FL (ref 9.2–12.9)
PO2 BLDA: 60 MMHG (ref 75–100)
POC BE: 5.3 MMOL/L (ref -2–2)
POC COHB: 3.2 % (ref 0–3)
POC METHB: 2.4 % (ref 0–1.5)
POC O2HB ARTERIAL: 87.4 % (ref 94–100)
POC SATURATED O2: 92.7 % (ref 90–100)
POC TCO2: 34.2 MMOL/L
POC THB: 10.7 G/DL (ref 12–18)
POCT GLUCOSE: 138 MG/DL (ref 70–110)
POCT GLUCOSE: 155 MG/DL (ref 70–110)
POCT GLUCOSE: 157 MG/DL (ref 70–110)
POCT GLUCOSE: 185 MG/DL (ref 70–110)
POIKILOCYTOSIS BLD QL SMEAR: ABNORMAL
POLYCHROMASIA BLD QL SMEAR: ABNORMAL
POTASSIUM SERPL-SCNC: 4.8 MMOL/L (ref 3.5–5.1)
POTASSIUM SERPL-SCNC: 5 MMOL/L (ref 3.5–5.1)
PROT SERPL-MCNC: 5.9 G/DL (ref 6–8.4)
RBC # BLD AUTO: 5.83 M/UL (ref 4–5.4)
SCHISTOCYTES BLD QL SMEAR: PRESENT
SITE: ABNORMAL
SODIUM SERPL-SCNC: 138 MMOL/L (ref 136–145)
SODIUM SERPL-SCNC: 142 MMOL/L (ref 136–145)
SPHEROCYTES BLD QL SMEAR: ABNORMAL
TARGETS BLD QL SMEAR: ABNORMAL
WBC # BLD AUTO: 6.22 K/UL (ref 3.9–12.7)

## 2021-11-01 PROCEDURE — 11000001 HC ACUTE MED/SURG PRIVATE ROOM

## 2021-11-01 PROCEDURE — 25000003 PHARM REV CODE 250: Performed by: STUDENT IN AN ORGANIZED HEALTH CARE EDUCATION/TRAINING PROGRAM

## 2021-11-01 PROCEDURE — 93010 ELECTROCARDIOGRAM REPORT: CPT | Mod: ,,, | Performed by: INTERNAL MEDICINE

## 2021-11-01 PROCEDURE — 36415 COLL VENOUS BLD VENIPUNCTURE: CPT | Performed by: STUDENT IN AN ORGANIZED HEALTH CARE EDUCATION/TRAINING PROGRAM

## 2021-11-01 PROCEDURE — 93005 ELECTROCARDIOGRAM TRACING: CPT

## 2021-11-01 PROCEDURE — 63600175 PHARM REV CODE 636 W HCPCS: Performed by: INTERNAL MEDICINE

## 2021-11-01 PROCEDURE — 99222 PR INITIAL HOSPITAL CARE,LEVL II: ICD-10-PCS | Mod: ,,, | Performed by: INTERNAL MEDICINE

## 2021-11-01 PROCEDURE — 99222 1ST HOSP IP/OBS MODERATE 55: CPT | Mod: ,,, | Performed by: INTERNAL MEDICINE

## 2021-11-01 PROCEDURE — 27000221 HC OXYGEN, UP TO 24 HOURS

## 2021-11-01 PROCEDURE — 80053 COMPREHEN METABOLIC PANEL: CPT | Performed by: STUDENT IN AN ORGANIZED HEALTH CARE EDUCATION/TRAINING PROGRAM

## 2021-11-01 PROCEDURE — 94761 N-INVAS EAR/PLS OXIMETRY MLT: CPT

## 2021-11-01 PROCEDURE — 82803 BLOOD GASES ANY COMBINATION: CPT | Performed by: NURSE PRACTITIONER

## 2021-11-01 PROCEDURE — 36600 WITHDRAWAL OF ARTERIAL BLOOD: CPT

## 2021-11-01 PROCEDURE — 36415 COLL VENOUS BLD VENIPUNCTURE: CPT | Performed by: INTERNAL MEDICINE

## 2021-11-01 PROCEDURE — 83735 ASSAY OF MAGNESIUM: CPT | Performed by: INTERNAL MEDICINE

## 2021-11-01 PROCEDURE — 83880 ASSAY OF NATRIURETIC PEPTIDE: CPT | Performed by: STUDENT IN AN ORGANIZED HEALTH CARE EDUCATION/TRAINING PROGRAM

## 2021-11-01 PROCEDURE — 80048 BASIC METABOLIC PNL TOTAL CA: CPT | Performed by: INTERNAL MEDICINE

## 2021-11-01 PROCEDURE — 93010 EKG 12-LEAD: ICD-10-PCS | Mod: ,,, | Performed by: INTERNAL MEDICINE

## 2021-11-01 PROCEDURE — 25000003 PHARM REV CODE 250: Performed by: INTERNAL MEDICINE

## 2021-11-01 PROCEDURE — 85025 COMPLETE CBC W/AUTO DIFF WBC: CPT | Performed by: STUDENT IN AN ORGANIZED HEALTH CARE EDUCATION/TRAINING PROGRAM

## 2021-11-01 PROCEDURE — 25000003 PHARM REV CODE 250: Performed by: NURSE PRACTITIONER

## 2021-11-01 RX ADMIN — HYDRALAZINE HYDROCHLORIDE: 25 TABLET, FILM COATED ORAL at 08:11

## 2021-11-01 RX ADMIN — ATORVASTATIN CALCIUM 40 MG: 40 TABLET, FILM COATED ORAL at 09:11

## 2021-11-01 RX ADMIN — MELATONIN TAB 3 MG 6 MG: 3 TAB at 09:11

## 2021-11-01 RX ADMIN — SACUBITRIL AND VALSARTAN 4 TABLET: 24; 26 TABLET, FILM COATED ORAL at 10:11

## 2021-11-01 RX ADMIN — FUROSEMIDE 40 MG: 10 INJECTION, SOLUTION INTRAMUSCULAR; INTRAVENOUS at 09:11

## 2021-11-01 RX ADMIN — METOPROLOL SUCCINATE 100 MG: 50 TABLET, EXTENDED RELEASE ORAL at 09:11

## 2021-11-01 RX ADMIN — ROPINIROLE HYDROCHLORIDE 4 MG: 1 TABLET, FILM COATED ORAL at 09:11

## 2021-11-01 RX ADMIN — METOPROLOL SUCCINATE 100 MG: 50 TABLET, EXTENDED RELEASE ORAL at 10:11

## 2021-11-01 RX ADMIN — APIXABAN 5 MG: 5 TABLET, FILM COATED ORAL at 09:11

## 2021-11-01 RX ADMIN — SACUBITRIL AND VALSARTAN 4 TABLET: 24; 26 TABLET, FILM COATED ORAL at 09:11

## 2021-11-01 RX ADMIN — FUROSEMIDE 40 MG: 10 INJECTION, SOLUTION INTRAMUSCULAR; INTRAVENOUS at 10:11

## 2021-11-01 RX ADMIN — HYDRALAZINE HYDROCHLORIDE: 25 TABLET, FILM COATED ORAL at 12:11

## 2021-11-01 RX ADMIN — APIXABAN 5 MG: 5 TABLET, FILM COATED ORAL at 10:11

## 2021-11-02 VITALS
WEIGHT: 242.94 LBS | RESPIRATION RATE: 17 BRPM | DIASTOLIC BLOOD PRESSURE: 75 MMHG | HEART RATE: 76 BPM | TEMPERATURE: 97 F | SYSTOLIC BLOOD PRESSURE: 130 MMHG | HEIGHT: 65 IN | OXYGEN SATURATION: 93 % | BODY MASS INDEX: 40.48 KG/M2

## 2021-11-02 PROBLEM — R25.3 MUSCLE TWITCH: Status: ACTIVE | Noted: 2021-11-02

## 2021-11-02 LAB
ALBUMIN SERPL BCP-MCNC: 3.3 G/DL (ref 3.5–5.2)
ALP SERPL-CCNC: 49 U/L (ref 55–135)
ALT SERPL W/O P-5'-P-CCNC: 22 U/L (ref 10–44)
AMMONIA PLAS-SCNC: 57 UMOL/L (ref 10–50)
ANION GAP SERPL CALC-SCNC: 13 MMOL/L (ref 8–16)
ANISOCYTOSIS BLD QL SMEAR: ABNORMAL
AST SERPL-CCNC: 25 U/L (ref 10–40)
BASOPHILS # BLD AUTO: 0.04 K/UL (ref 0–0.2)
BASOPHILS NFR BLD: 0.6 % (ref 0–1.9)
BILIRUB SERPL-MCNC: 2.4 MG/DL (ref 0.1–1)
BNP SERPL-MCNC: 3688 PG/ML (ref 0–99)
BUN SERPL-MCNC: 64 MG/DL (ref 6–20)
CALCIUM SERPL-MCNC: 9.4 MG/DL (ref 8.7–10.5)
CHLORIDE SERPL-SCNC: 103 MMOL/L (ref 95–110)
CO2 SERPL-SCNC: 24 MMOL/L (ref 23–29)
CREAT SERPL-MCNC: 1.9 MG/DL (ref 0.5–1.4)
DIFFERENTIAL METHOD: ABNORMAL
EOSINOPHIL # BLD AUTO: 0.1 K/UL (ref 0–0.5)
EOSINOPHIL NFR BLD: 1.9 % (ref 0–8)
ERYTHROCYTE [DISTWIDTH] IN BLOOD BY AUTOMATED COUNT: 30.5 % (ref 11.5–14.5)
EST. GFR  (AFRICAN AMERICAN): 33 ML/MIN/1.73 M^2
EST. GFR  (NON AFRICAN AMERICAN): 29 ML/MIN/1.73 M^2
GLUCOSE SERPL-MCNC: 100 MG/DL (ref 70–110)
HCT VFR BLD AUTO: 37.4 % (ref 37–48.5)
HGB BLD-MCNC: 10.9 G/DL (ref 12–16)
HYPOCHROMIA BLD QL SMEAR: ABNORMAL
IMM GRANULOCYTES # BLD AUTO: 0.11 K/UL (ref 0–0.04)
IMM GRANULOCYTES NFR BLD AUTO: 1.8 % (ref 0–0.5)
LYMPHOCYTES # BLD AUTO: 1.1 K/UL (ref 1–4.8)
LYMPHOCYTES NFR BLD: 18.3 % (ref 18–48)
MCH RBC QN AUTO: 17.7 PG (ref 27–31)
MCHC RBC AUTO-ENTMCNC: 29.1 G/DL (ref 32–36)
MCV RBC AUTO: 61 FL (ref 82–98)
MONOCYTES # BLD AUTO: 0.6 K/UL (ref 0.3–1)
MONOCYTES NFR BLD: 9.9 % (ref 4–15)
NEUTROPHILS # BLD AUTO: 4.2 K/UL (ref 1.8–7.7)
NEUTROPHILS NFR BLD: 67.5 % (ref 38–73)
NRBC BLD-RTO: 8 /100 WBC
OVALOCYTES BLD QL SMEAR: ABNORMAL
PLATELET # BLD AUTO: 169 K/UL (ref 150–450)
PLATELET BLD QL SMEAR: ABNORMAL
PMV BLD AUTO: ABNORMAL FL (ref 9.2–12.9)
POCT GLUCOSE: 115 MG/DL (ref 70–110)
POCT GLUCOSE: 167 MG/DL (ref 70–110)
POIKILOCYTOSIS BLD QL SMEAR: ABNORMAL
POLYCHROMASIA BLD QL SMEAR: ABNORMAL
POTASSIUM SERPL-SCNC: 5.1 MMOL/L (ref 3.5–5.1)
PROT SERPL-MCNC: 6.4 G/DL (ref 6–8.4)
RBC # BLD AUTO: 6.17 M/UL (ref 4–5.4)
SCHISTOCYTES BLD QL SMEAR: PRESENT
SODIUM SERPL-SCNC: 140 MMOL/L (ref 136–145)
TARGETS BLD QL SMEAR: ABNORMAL
WBC # BLD AUTO: 6.16 K/UL (ref 3.9–12.7)

## 2021-11-02 PROCEDURE — 99239 PR HOSPITAL DISCHARGE DAY,>30 MIN: ICD-10-PCS | Mod: ,,, | Performed by: INTERNAL MEDICINE

## 2021-11-02 PROCEDURE — 36415 COLL VENOUS BLD VENIPUNCTURE: CPT | Performed by: NURSE PRACTITIONER

## 2021-11-02 PROCEDURE — 99239 HOSP IP/OBS DSCHRG MGMT >30: CPT | Mod: ,,, | Performed by: INTERNAL MEDICINE

## 2021-11-02 PROCEDURE — 94760 N-INVAS EAR/PLS OXIMETRY 1: CPT

## 2021-11-02 PROCEDURE — 85025 COMPLETE CBC W/AUTO DIFF WBC: CPT | Performed by: STUDENT IN AN ORGANIZED HEALTH CARE EDUCATION/TRAINING PROGRAM

## 2021-11-02 PROCEDURE — 25000003 PHARM REV CODE 250: Performed by: INTERNAL MEDICINE

## 2021-11-02 PROCEDURE — 83880 ASSAY OF NATRIURETIC PEPTIDE: CPT | Performed by: STUDENT IN AN ORGANIZED HEALTH CARE EDUCATION/TRAINING PROGRAM

## 2021-11-02 PROCEDURE — 63600175 PHARM REV CODE 636 W HCPCS: Performed by: INTERNAL MEDICINE

## 2021-11-02 PROCEDURE — 82140 ASSAY OF AMMONIA: CPT | Performed by: NURSE PRACTITIONER

## 2021-11-02 PROCEDURE — 80053 COMPREHEN METABOLIC PANEL: CPT | Performed by: STUDENT IN AN ORGANIZED HEALTH CARE EDUCATION/TRAINING PROGRAM

## 2021-11-02 PROCEDURE — 25000003 PHARM REV CODE 250: Performed by: STUDENT IN AN ORGANIZED HEALTH CARE EDUCATION/TRAINING PROGRAM

## 2021-11-02 PROCEDURE — 27000221 HC OXYGEN, UP TO 24 HOURS

## 2021-11-02 RX ORDER — ISOSORBIDE DINITRATE AND HYDRALAZINE HYDROCHLORIDE 37.5; 2 MG/1; MG/1
1 TABLET ORAL 2 TIMES DAILY
Qty: 60 TABLET | Refills: 0 | Status: SHIPPED | OUTPATIENT
Start: 2021-11-02 | End: 2021-12-22

## 2021-11-02 RX ORDER — FUROSEMIDE 20 MG/1
80 TABLET ORAL DAILY
Qty: 30 TABLET | Refills: 0 | Status: ON HOLD | OUTPATIENT
Start: 2021-11-02 | End: 2021-12-09 | Stop reason: HOSPADM

## 2021-11-02 RX ADMIN — SACUBITRIL AND VALSARTAN 4 TABLET: 24; 26 TABLET, FILM COATED ORAL at 10:11

## 2021-11-02 RX ADMIN — APIXABAN 5 MG: 5 TABLET, FILM COATED ORAL at 07:11

## 2021-11-02 RX ADMIN — FUROSEMIDE 40 MG: 10 INJECTION, SOLUTION INTRAMUSCULAR; INTRAVENOUS at 07:11

## 2021-11-02 RX ADMIN — METOPROLOL SUCCINATE 100 MG: 50 TABLET, EXTENDED RELEASE ORAL at 07:11

## 2021-11-02 RX ADMIN — ROPINIROLE HYDROCHLORIDE 4 MG: 1 TABLET, FILM COATED ORAL at 07:11

## 2021-11-02 RX ADMIN — ATORVASTATIN CALCIUM 40 MG: 40 TABLET, FILM COATED ORAL at 07:11

## 2021-11-02 RX ADMIN — HYDRALAZINE HYDROCHLORIDE: 25 TABLET, FILM COATED ORAL at 07:11

## 2021-11-03 ENCOUNTER — PATIENT OUTREACH (OUTPATIENT)
Dept: ADMINISTRATIVE | Facility: CLINIC | Age: 56
End: 2021-11-03
Payer: MEDICAID

## 2021-11-19 ENCOUNTER — CLINICAL SUPPORT (OUTPATIENT)
Dept: FAMILY MEDICINE | Facility: CLINIC | Age: 56
End: 2021-11-19
Payer: MEDICAID

## 2021-11-19 ENCOUNTER — OFFICE VISIT (OUTPATIENT)
Dept: FAMILY MEDICINE | Facility: CLINIC | Age: 56
End: 2021-11-19
Payer: MEDICAID

## 2021-11-19 VITALS
HEART RATE: 108 BPM | RESPIRATION RATE: 24 BRPM | WEIGHT: 221.25 LBS | DIASTOLIC BLOOD PRESSURE: 92 MMHG | BODY MASS INDEX: 35.56 KG/M2 | SYSTOLIC BLOOD PRESSURE: 124 MMHG | HEIGHT: 66 IN

## 2021-11-19 DIAGNOSIS — I10 ESSENTIAL HYPERTENSION: ICD-10-CM

## 2021-11-19 DIAGNOSIS — Z95.810 ICD (IMPLANTABLE CARDIOVERTER-DEFIBRILLATOR) IN PLACE: ICD-10-CM

## 2021-11-19 DIAGNOSIS — F32.A ANXIETY AND DEPRESSION: ICD-10-CM

## 2021-11-19 DIAGNOSIS — N18.32 STAGE 3B CHRONIC KIDNEY DISEASE: ICD-10-CM

## 2021-11-19 DIAGNOSIS — D50.9 MICROCYTIC ANEMIA: ICD-10-CM

## 2021-11-19 DIAGNOSIS — I42.8 NICM (NONISCHEMIC CARDIOMYOPATHY): ICD-10-CM

## 2021-11-19 DIAGNOSIS — E11.9 TYPE 2 DIABETES MELLITUS WITHOUT COMPLICATION, WITHOUT LONG-TERM CURRENT USE OF INSULIN: Primary | ICD-10-CM

## 2021-11-19 DIAGNOSIS — I48.0 PAROXYSMAL ATRIAL FIBRILLATION: ICD-10-CM

## 2021-11-19 DIAGNOSIS — R25.1 TREMOR: ICD-10-CM

## 2021-11-19 DIAGNOSIS — J44.9 CHRONIC OBSTRUCTIVE PULMONARY DISEASE, UNSPECIFIED COPD TYPE: ICD-10-CM

## 2021-11-19 DIAGNOSIS — J96.11 CHRONIC RESPIRATORY FAILURE WITH HYPOXIA: ICD-10-CM

## 2021-11-19 DIAGNOSIS — I50.42 CHRONIC COMBINED SYSTOLIC AND DIASTOLIC HEART FAILURE: ICD-10-CM

## 2021-11-19 DIAGNOSIS — R60.0 LOWER EXTREMITY EDEMA: ICD-10-CM

## 2021-11-19 DIAGNOSIS — G47.33 OSA (OBSTRUCTIVE SLEEP APNEA): ICD-10-CM

## 2021-11-19 DIAGNOSIS — F41.9 ANXIETY AND DEPRESSION: ICD-10-CM

## 2021-11-19 LAB
ACANTHOCYTES BLD QL SMEAR: PRESENT
ALBUMIN SERPL BCP-MCNC: 3.4 G/DL (ref 3.5–5.2)
ALP SERPL-CCNC: 54 U/L (ref 55–135)
ALT SERPL W/O P-5'-P-CCNC: 30 U/L (ref 10–44)
ANION GAP SERPL CALC-SCNC: 13 MMOL/L (ref 8–16)
ANISOCYTOSIS BLD QL SMEAR: ABNORMAL
AST SERPL-CCNC: 25 U/L (ref 10–40)
BASOPHILS # BLD AUTO: ABNORMAL K/UL (ref 0–0.2)
BASOPHILS NFR BLD: 0 % (ref 0–1.9)
BILIRUB SERPL-MCNC: 3.1 MG/DL (ref 0.1–1)
BNP SERPL-MCNC: >4900 PG/ML (ref 0–99)
BUN SERPL-MCNC: 60 MG/DL (ref 6–20)
CALCIUM SERPL-MCNC: 9.2 MG/DL (ref 8.7–10.5)
CHLORIDE SERPL-SCNC: 107 MMOL/L (ref 95–110)
CO2 SERPL-SCNC: 26 MMOL/L (ref 23–29)
CREAT SERPL-MCNC: 2.5 MG/DL (ref 0.5–1.4)
DIFFERENTIAL METHOD: ABNORMAL
EOSINOPHIL # BLD AUTO: ABNORMAL K/UL (ref 0–0.5)
EOSINOPHIL NFR BLD: 2 % (ref 0–8)
ERYTHROCYTE [DISTWIDTH] IN BLOOD BY AUTOMATED COUNT: 30.8 % (ref 11.5–14.5)
EST. GFR  (AFRICAN AMERICAN): 24 ML/MIN/1.73 M^2
EST. GFR  (NON AFRICAN AMERICAN): 21 ML/MIN/1.73 M^2
GLUCOSE SERPL-MCNC: 98 MG/DL (ref 70–110)
HCT VFR BLD AUTO: 36.2 % (ref 37–48.5)
HGB BLD-MCNC: 10.2 G/DL (ref 12–16)
HYPOCHROMIA BLD QL SMEAR: ABNORMAL
IMM GRANULOCYTES # BLD AUTO: ABNORMAL K/UL (ref 0–0.04)
IMM GRANULOCYTES NFR BLD AUTO: ABNORMAL % (ref 0–0.5)
LYMPHOCYTES # BLD AUTO: ABNORMAL K/UL (ref 1–4.8)
LYMPHOCYTES NFR BLD: 22 % (ref 18–48)
MAGNESIUM SERPL-MCNC: 2 MG/DL (ref 1.6–2.6)
MCH RBC QN AUTO: 17.6 PG (ref 27–31)
MCHC RBC AUTO-ENTMCNC: 28.2 G/DL (ref 32–36)
MCV RBC AUTO: 62 FL (ref 82–98)
MONOCYTES # BLD AUTO: ABNORMAL K/UL (ref 0.3–1)
MONOCYTES NFR BLD: 10 % (ref 4–15)
NEUTROPHILS NFR BLD: 62 % (ref 38–73)
NEUTS BAND NFR BLD MANUAL: 4 %
NRBC BLD-RTO: 6 /100 WBC
OVALOCYTES BLD QL SMEAR: ABNORMAL
PLATELET # BLD AUTO: 176 K/UL (ref 150–450)
PLATELET BLD QL SMEAR: ABNORMAL
PMV BLD AUTO: ABNORMAL FL (ref 9.2–12.9)
POIKILOCYTOSIS BLD QL SMEAR: ABNORMAL
POLYCHROMASIA BLD QL SMEAR: ABNORMAL
POTASSIUM SERPL-SCNC: 4.7 MMOL/L (ref 3.5–5.1)
PROT SERPL-MCNC: 6 G/DL (ref 6–8.4)
RBC # BLD AUTO: 5.81 M/UL (ref 4–5.4)
SCHISTOCYTES BLD QL SMEAR: PRESENT
SODIUM SERPL-SCNC: 146 MMOL/L (ref 136–145)
TARGETS BLD QL SMEAR: ABNORMAL
TSH SERPL DL<=0.005 MIU/L-ACNC: 1.56 UIU/ML (ref 0.4–4)
WBC # BLD AUTO: 6.23 K/UL (ref 3.9–12.7)

## 2021-11-19 PROCEDURE — 83735 ASSAY OF MAGNESIUM: CPT | Performed by: STUDENT IN AN ORGANIZED HEALTH CARE EDUCATION/TRAINING PROGRAM

## 2021-11-19 PROCEDURE — 99214 OFFICE O/P EST MOD 30 MIN: CPT | Mod: S$PBB,,, | Performed by: STUDENT IN AN ORGANIZED HEALTH CARE EDUCATION/TRAINING PROGRAM

## 2021-11-19 PROCEDURE — 99999 PR PBB SHADOW E&M-EST. PATIENT-LVL V: CPT | Mod: PBBFAC,,, | Performed by: STUDENT IN AN ORGANIZED HEALTH CARE EDUCATION/TRAINING PROGRAM

## 2021-11-19 PROCEDURE — 85027 COMPLETE CBC AUTOMATED: CPT | Performed by: STUDENT IN AN ORGANIZED HEALTH CARE EDUCATION/TRAINING PROGRAM

## 2021-11-19 PROCEDURE — 83880 ASSAY OF NATRIURETIC PEPTIDE: CPT | Performed by: STUDENT IN AN ORGANIZED HEALTH CARE EDUCATION/TRAINING PROGRAM

## 2021-11-19 PROCEDURE — 99215 OFFICE O/P EST HI 40 MIN: CPT | Mod: PBBFAC | Performed by: STUDENT IN AN ORGANIZED HEALTH CARE EDUCATION/TRAINING PROGRAM

## 2021-11-19 PROCEDURE — 99999 PR PBB SHADOW E&M-EST. PATIENT-LVL V: ICD-10-PCS | Mod: PBBFAC,,, | Performed by: STUDENT IN AN ORGANIZED HEALTH CARE EDUCATION/TRAINING PROGRAM

## 2021-11-19 PROCEDURE — 99214 PR OFFICE/OUTPT VISIT, EST, LEVL IV, 30-39 MIN: ICD-10-PCS | Mod: S$PBB,,, | Performed by: STUDENT IN AN ORGANIZED HEALTH CARE EDUCATION/TRAINING PROGRAM

## 2021-11-19 PROCEDURE — 84443 ASSAY THYROID STIM HORMONE: CPT | Performed by: STUDENT IN AN ORGANIZED HEALTH CARE EDUCATION/TRAINING PROGRAM

## 2021-11-19 PROCEDURE — 84466 ASSAY OF TRANSFERRIN: CPT | Performed by: STUDENT IN AN ORGANIZED HEALTH CARE EDUCATION/TRAINING PROGRAM

## 2021-11-19 PROCEDURE — 82306 VITAMIN D 25 HYDROXY: CPT | Performed by: STUDENT IN AN ORGANIZED HEALTH CARE EDUCATION/TRAINING PROGRAM

## 2021-11-19 PROCEDURE — 36415 COLL VENOUS BLD VENIPUNCTURE: CPT | Mod: PBBFAC

## 2021-11-19 PROCEDURE — 80053 COMPREHEN METABOLIC PANEL: CPT | Performed by: STUDENT IN AN ORGANIZED HEALTH CARE EDUCATION/TRAINING PROGRAM

## 2021-11-19 PROCEDURE — 85007 BL SMEAR W/DIFF WBC COUNT: CPT | Performed by: STUDENT IN AN ORGANIZED HEALTH CARE EDUCATION/TRAINING PROGRAM

## 2021-11-20 LAB
25(OH)D3+25(OH)D2 SERPL-MCNC: 23 NG/ML (ref 30–96)
IRON SERPL-MCNC: 68 UG/DL (ref 30–160)
SATURATED IRON: 25 % (ref 20–50)
TOTAL IRON BINDING CAPACITY: 272 UG/DL (ref 250–450)
TRANSFERRIN SERPL-MCNC: 184 MG/DL (ref 200–375)

## 2021-11-28 ENCOUNTER — HOSPITAL ENCOUNTER (EMERGENCY)
Facility: HOSPITAL | Age: 56
Discharge: ANOTHER HOSPITAL | End: 2021-11-30
Attending: STUDENT IN AN ORGANIZED HEALTH CARE EDUCATION/TRAINING PROGRAM
Payer: MEDICAID

## 2021-11-28 DIAGNOSIS — I47.29 VENTRICULAR TACHYCARDIA, NON-SUSTAINED: ICD-10-CM

## 2021-11-28 DIAGNOSIS — R06.02 SHORTNESS OF BREATH: ICD-10-CM

## 2021-11-28 LAB
ALBUMIN SERPL BCP-MCNC: 3.3 G/DL (ref 3.5–5.2)
ALP SERPL-CCNC: 48 U/L (ref 55–135)
ALT SERPL W/O P-5'-P-CCNC: 28 U/L (ref 10–44)
ANION GAP SERPL CALC-SCNC: 12 MMOL/L (ref 8–16)
ANISOCYTOSIS BLD QL SMEAR: ABNORMAL
APTT BLDCRRT: 25.3 SEC (ref 21–32)
AST SERPL-CCNC: 27 U/L (ref 10–40)
BASOPHILS # BLD AUTO: 0.03 K/UL (ref 0–0.2)
BASOPHILS NFR BLD: 0.5 % (ref 0–1.9)
BILIRUB SERPL-MCNC: 3.6 MG/DL (ref 0.1–1)
BNP SERPL-MCNC: >4900 PG/ML (ref 0–99)
BUN SERPL-MCNC: 45 MG/DL (ref 6–20)
CALCIUM SERPL-MCNC: 9.3 MG/DL (ref 8.7–10.5)
CHLORIDE SERPL-SCNC: 106 MMOL/L (ref 95–110)
CO2 SERPL-SCNC: 26 MMOL/L (ref 23–29)
CREAT SERPL-MCNC: 1.8 MG/DL (ref 0.5–1.4)
D DIMER PPP IA.FEU-MCNC: 1.16 MG/L FEU
DIFFERENTIAL METHOD: ABNORMAL
EOSINOPHIL # BLD AUTO: 0.1 K/UL (ref 0–0.5)
EOSINOPHIL NFR BLD: 1.6 % (ref 0–8)
ERYTHROCYTE [DISTWIDTH] IN BLOOD BY AUTOMATED COUNT: 31.4 % (ref 11.5–14.5)
EST. GFR  (AFRICAN AMERICAN): 36 ML/MIN/1.73 M^2
EST. GFR  (NON AFRICAN AMERICAN): 31 ML/MIN/1.73 M^2
GLUCOSE SERPL-MCNC: 167 MG/DL (ref 70–110)
HCT VFR BLD AUTO: 38.1 % (ref 37–48.5)
HGB BLD-MCNC: 11 G/DL (ref 12–16)
HYPOCHROMIA BLD QL SMEAR: ABNORMAL
IMM GRANULOCYTES # BLD AUTO: 0.03 K/UL (ref 0–0.04)
IMM GRANULOCYTES NFR BLD AUTO: 0.5 % (ref 0–0.5)
INR PPP: 1 (ref 0.8–1.2)
LYMPHOCYTES # BLD AUTO: 1 K/UL (ref 1–4.8)
LYMPHOCYTES NFR BLD: 16.6 % (ref 18–48)
MCH RBC QN AUTO: 18.2 PG (ref 27–31)
MCHC RBC AUTO-ENTMCNC: 28.9 G/DL (ref 32–36)
MCV RBC AUTO: 63 FL (ref 82–98)
MONOCYTES # BLD AUTO: 0.5 K/UL (ref 0.3–1)
MONOCYTES NFR BLD: 8.2 % (ref 4–15)
NEUTROPHILS # BLD AUTO: 4.4 K/UL (ref 1.8–7.7)
NEUTROPHILS NFR BLD: 72.6 % (ref 38–73)
NRBC BLD-RTO: 4 /100 WBC
PLATELET # BLD AUTO: 150 K/UL (ref 150–450)
PLATELET BLD QL SMEAR: ABNORMAL
PMV BLD AUTO: ABNORMAL FL (ref 9.2–12.9)
POIKILOCYTOSIS BLD QL SMEAR: ABNORMAL
POTASSIUM SERPL-SCNC: 4.3 MMOL/L (ref 3.5–5.1)
PROT SERPL-MCNC: 5.5 G/DL (ref 6–8.4)
PROTHROMBIN TIME: 10.8 SEC (ref 9–12.5)
RBC # BLD AUTO: 6.04 M/UL (ref 4–5.4)
SARS-COV-2 RDRP RESP QL NAA+PROBE: NEGATIVE
SCHISTOCYTES BLD QL SMEAR: PRESENT
SODIUM SERPL-SCNC: 144 MMOL/L (ref 136–145)
TARGETS BLD QL SMEAR: ABNORMAL
TROPONIN I SERPL DL<=0.01 NG/ML-MCNC: 3.39 NG/ML (ref 0–0.03)
WBC # BLD AUTO: 6.09 K/UL (ref 3.9–12.7)

## 2021-11-28 PROCEDURE — U0002 COVID-19 LAB TEST NON-CDC: HCPCS | Performed by: STUDENT IN AN ORGANIZED HEALTH CARE EDUCATION/TRAINING PROGRAM

## 2021-11-28 PROCEDURE — 85379 FIBRIN DEGRADATION QUANT: CPT | Performed by: STUDENT IN AN ORGANIZED HEALTH CARE EDUCATION/TRAINING PROGRAM

## 2021-11-28 PROCEDURE — 63600175 PHARM REV CODE 636 W HCPCS: Performed by: STUDENT IN AN ORGANIZED HEALTH CARE EDUCATION/TRAINING PROGRAM

## 2021-11-28 PROCEDURE — 99285 EMERGENCY DEPT VISIT HI MDM: CPT | Mod: 25

## 2021-11-28 PROCEDURE — 85610 PROTHROMBIN TIME: CPT | Performed by: STUDENT IN AN ORGANIZED HEALTH CARE EDUCATION/TRAINING PROGRAM

## 2021-11-28 PROCEDURE — 93005 ELECTROCARDIOGRAM TRACING: CPT

## 2021-11-28 PROCEDURE — 85730 THROMBOPLASTIN TIME PARTIAL: CPT | Performed by: STUDENT IN AN ORGANIZED HEALTH CARE EDUCATION/TRAINING PROGRAM

## 2021-11-28 PROCEDURE — 85025 COMPLETE CBC W/AUTO DIFF WBC: CPT | Performed by: STUDENT IN AN ORGANIZED HEALTH CARE EDUCATION/TRAINING PROGRAM

## 2021-11-28 PROCEDURE — 83880 ASSAY OF NATRIURETIC PEPTIDE: CPT | Performed by: STUDENT IN AN ORGANIZED HEALTH CARE EDUCATION/TRAINING PROGRAM

## 2021-11-28 PROCEDURE — 25000003 PHARM REV CODE 250: Performed by: STUDENT IN AN ORGANIZED HEALTH CARE EDUCATION/TRAINING PROGRAM

## 2021-11-28 PROCEDURE — 93010 ELECTROCARDIOGRAM REPORT: CPT | Mod: ,,, | Performed by: INTERNAL MEDICINE

## 2021-11-28 PROCEDURE — 96365 THER/PROPH/DIAG IV INF INIT: CPT

## 2021-11-28 PROCEDURE — 80053 COMPREHEN METABOLIC PANEL: CPT | Performed by: STUDENT IN AN ORGANIZED HEALTH CARE EDUCATION/TRAINING PROGRAM

## 2021-11-28 PROCEDURE — 96376 TX/PRO/DX INJ SAME DRUG ADON: CPT

## 2021-11-28 PROCEDURE — 99900035 HC TECH TIME PER 15 MIN (STAT)

## 2021-11-28 PROCEDURE — 84484 ASSAY OF TROPONIN QUANT: CPT | Performed by: STUDENT IN AN ORGANIZED HEALTH CARE EDUCATION/TRAINING PROGRAM

## 2021-11-28 PROCEDURE — 93010 EKG 12-LEAD: ICD-10-PCS | Mod: ,,, | Performed by: INTERNAL MEDICINE

## 2021-11-28 PROCEDURE — 96375 TX/PRO/DX INJ NEW DRUG ADDON: CPT

## 2021-11-28 RX ORDER — ASPIRIN 325 MG
325 TABLET ORAL
Status: COMPLETED | OUTPATIENT
Start: 2021-11-28 | End: 2021-11-28

## 2021-11-28 RX ORDER — ONDANSETRON 2 MG/ML
4 INJECTION INTRAMUSCULAR; INTRAVENOUS
Status: COMPLETED | OUTPATIENT
Start: 2021-11-28 | End: 2021-11-28

## 2021-11-28 RX ORDER — FUROSEMIDE 10 MG/ML
80 INJECTION INTRAMUSCULAR; INTRAVENOUS
Status: COMPLETED | OUTPATIENT
Start: 2021-11-28 | End: 2021-11-28

## 2021-11-28 RX ORDER — ONDANSETRON 2 MG/ML
4 INJECTION INTRAMUSCULAR; INTRAVENOUS EVERY 6 HOURS PRN
Status: DISCONTINUED | OUTPATIENT
Start: 2021-11-28 | End: 2021-11-30 | Stop reason: HOSPADM

## 2021-11-28 RX ORDER — HEPARIN SODIUM,PORCINE/D5W 25000/250
0-40 INTRAVENOUS SOLUTION INTRAVENOUS CONTINUOUS
Status: DISCONTINUED | OUTPATIENT
Start: 2021-11-28 | End: 2021-11-28

## 2021-11-28 RX ADMIN — HEPARIN SODIUM AND DEXTROSE 12 UNITS/KG/HR: 10000; 5 INJECTION INTRAVENOUS at 07:11

## 2021-11-28 RX ADMIN — ASPIRIN 325 MG: 325 TABLET ORAL at 06:11

## 2021-11-28 RX ADMIN — FUROSEMIDE 80 MG: 10 INJECTION, SOLUTION INTRAMUSCULAR; INTRAVENOUS at 06:11

## 2021-11-28 RX ADMIN — ONDANSETRON HYDROCHLORIDE 4 MG: 2 SOLUTION INTRAMUSCULAR; INTRAVENOUS at 05:11

## 2021-11-28 RX ADMIN — ONDANSETRON HYDROCHLORIDE 4 MG: 2 SOLUTION INTRAMUSCULAR; INTRAVENOUS at 09:11

## 2021-11-29 LAB
ACANTHOCYTES BLD QL SMEAR: ABNORMAL
ALBUMIN SERPL BCP-MCNC: 3.2 G/DL (ref 3.5–5.2)
ALP SERPL-CCNC: 42 U/L (ref 55–135)
ALT SERPL W/O P-5'-P-CCNC: 27 U/L (ref 10–44)
ANION GAP SERPL CALC-SCNC: 12 MMOL/L (ref 8–16)
ANISOCYTOSIS BLD QL SMEAR: ABNORMAL
AST SERPL-CCNC: 26 U/L (ref 10–40)
BASOPHILS # BLD AUTO: 0.04 K/UL (ref 0–0.2)
BASOPHILS NFR BLD: 0.9 % (ref 0–1.9)
BILIRUB SERPL-MCNC: 3.4 MG/DL (ref 0.1–1)
BUN SERPL-MCNC: 49 MG/DL (ref 6–20)
CALCIUM SERPL-MCNC: 9.7 MG/DL (ref 8.7–10.5)
CHLORIDE SERPL-SCNC: 106 MMOL/L (ref 95–110)
CO2 SERPL-SCNC: 27 MMOL/L (ref 23–29)
CREAT SERPL-MCNC: 2 MG/DL (ref 0.5–1.4)
DIFFERENTIAL METHOD: ABNORMAL
EOSINOPHIL # BLD AUTO: 0.1 K/UL (ref 0–0.5)
EOSINOPHIL NFR BLD: 2.4 % (ref 0–8)
ERYTHROCYTE [DISTWIDTH] IN BLOOD BY AUTOMATED COUNT: 31.1 % (ref 11.5–14.5)
EST. GFR  (AFRICAN AMERICAN): 31 ML/MIN/1.73 M^2
EST. GFR  (NON AFRICAN AMERICAN): 27 ML/MIN/1.73 M^2
GLUCOSE SERPL-MCNC: 83 MG/DL (ref 70–110)
HCT VFR BLD AUTO: 39.3 % (ref 37–48.5)
HGB BLD-MCNC: 10.9 G/DL (ref 12–16)
HYPOCHROMIA BLD QL SMEAR: ABNORMAL
IMM GRANULOCYTES # BLD AUTO: 0.05 K/UL (ref 0–0.04)
IMM GRANULOCYTES NFR BLD AUTO: 1.1 % (ref 0–0.5)
LYMPHOCYTES # BLD AUTO: 1.2 K/UL (ref 1–4.8)
LYMPHOCYTES NFR BLD: 25.7 % (ref 18–48)
MCH RBC QN AUTO: 18 PG (ref 27–31)
MCHC RBC AUTO-ENTMCNC: 27.7 G/DL (ref 32–36)
MCV RBC AUTO: 65 FL (ref 82–98)
MONOCYTES # BLD AUTO: 0.5 K/UL (ref 0.3–1)
MONOCYTES NFR BLD: 10.5 % (ref 4–15)
NEUTROPHILS # BLD AUTO: 2.7 K/UL (ref 1.8–7.7)
NEUTROPHILS NFR BLD: 59.4 % (ref 38–73)
NRBC BLD-RTO: 4 /100 WBC
OVALOCYTES BLD QL SMEAR: ABNORMAL
PLATELET # BLD AUTO: 123 K/UL (ref 150–450)
PLATELET BLD QL SMEAR: ABNORMAL
PMV BLD AUTO: ABNORMAL FL (ref 9.2–12.9)
POIKILOCYTOSIS BLD QL SMEAR: ABNORMAL
POLYCHROMASIA BLD QL SMEAR: ABNORMAL
POTASSIUM SERPL-SCNC: 5.8 MMOL/L (ref 3.5–5.1)
PROT SERPL-MCNC: 5.9 G/DL (ref 6–8.4)
RBC # BLD AUTO: 6.04 M/UL (ref 4–5.4)
SCHISTOCYTES BLD QL SMEAR: ABNORMAL
SODIUM SERPL-SCNC: 145 MMOL/L (ref 136–145)
TARGETS BLD QL SMEAR: ABNORMAL
TROPONIN I SERPL DL<=0.01 NG/ML-MCNC: 3.19 NG/ML (ref 0–0.03)
TROPONIN I SERPL DL<=0.01 NG/ML-MCNC: 3.21 NG/ML (ref 0–0.03)
TROPONIN I SERPL DL<=0.01 NG/ML-MCNC: 3.32 NG/ML (ref 0–0.03)
WBC # BLD AUTO: 4.59 K/UL (ref 3.9–12.7)

## 2021-11-29 PROCEDURE — 80053 COMPREHEN METABOLIC PANEL: CPT | Performed by: SURGERY

## 2021-11-29 PROCEDURE — 63600175 PHARM REV CODE 636 W HCPCS: Performed by: SURGERY

## 2021-11-29 PROCEDURE — 94640 AIRWAY INHALATION TREATMENT: CPT

## 2021-11-29 PROCEDURE — 85025 COMPLETE CBC W/AUTO DIFF WBC: CPT | Performed by: SURGERY

## 2021-11-29 PROCEDURE — 25000003 PHARM REV CODE 250: Performed by: SURGERY

## 2021-11-29 PROCEDURE — 63600175 PHARM REV CODE 636 W HCPCS: Performed by: STUDENT IN AN ORGANIZED HEALTH CARE EDUCATION/TRAINING PROGRAM

## 2021-11-29 PROCEDURE — 27000190 HC CPAP FULL FACE MASK W/VALVE

## 2021-11-29 PROCEDURE — 84484 ASSAY OF TROPONIN QUANT: CPT | Performed by: SURGERY

## 2021-11-29 PROCEDURE — 25000242 PHARM REV CODE 250 ALT 637 W/ HCPCS: Performed by: SURGERY

## 2021-11-29 PROCEDURE — 99900035 HC TECH TIME PER 15 MIN (STAT)

## 2021-11-29 PROCEDURE — 36415 COLL VENOUS BLD VENIPUNCTURE: CPT | Performed by: SURGERY

## 2021-11-29 RX ORDER — METOPROLOL TARTRATE 50 MG/1
50 TABLET ORAL 2 TIMES DAILY
Status: DISCONTINUED | OUTPATIENT
Start: 2021-11-29 | End: 2021-11-30 | Stop reason: HOSPADM

## 2021-11-29 RX ORDER — ASPIRIN 325 MG
325 TABLET ORAL DAILY
Status: DISCONTINUED | OUTPATIENT
Start: 2021-11-29 | End: 2021-11-30 | Stop reason: HOSPADM

## 2021-11-29 RX ORDER — FUROSEMIDE 10 MG/ML
40 INJECTION INTRAMUSCULAR; INTRAVENOUS
Status: COMPLETED | OUTPATIENT
Start: 2021-11-29 | End: 2021-11-29

## 2021-11-29 RX ORDER — FUROSEMIDE 10 MG/ML
40 INJECTION INTRAMUSCULAR; INTRAVENOUS
Status: DISCONTINUED | OUTPATIENT
Start: 2021-11-29 | End: 2021-11-30 | Stop reason: HOSPADM

## 2021-11-29 RX ORDER — LEVALBUTEROL 1.25 MG/.5ML
1.25 SOLUTION, CONCENTRATE RESPIRATORY (INHALATION) EVERY 6 HOURS PRN
Status: DISCONTINUED | OUTPATIENT
Start: 2021-11-29 | End: 2021-11-30 | Stop reason: HOSPADM

## 2021-11-29 RX ADMIN — FUROSEMIDE 40 MG: 10 INJECTION, SOLUTION INTRAMUSCULAR; INTRAVENOUS at 06:11

## 2021-11-29 RX ADMIN — LEVALBUTEROL 1.25 MG: 1.25 SOLUTION, CONCENTRATE RESPIRATORY (INHALATION) at 09:11

## 2021-11-29 RX ADMIN — METOPROLOL TARTRATE 50 MG: 50 TABLET, FILM COATED ORAL at 09:11

## 2021-11-29 RX ADMIN — APIXABAN 5 MG: 5 TABLET, FILM COATED ORAL at 10:11

## 2021-11-29 RX ADMIN — ASPIRIN 325 MG: 325 TABLET ORAL at 06:11

## 2021-11-29 RX ADMIN — ISOSORBIDE DINITRATE: 20 TABLET ORAL at 10:11

## 2021-11-29 RX ADMIN — SACUBITRIL AND VALSARTAN 1 TABLET: 24; 26 TABLET, FILM COATED ORAL at 10:11

## 2021-11-29 RX ADMIN — LEVALBUTEROL 1.25 MG: 1.25 SOLUTION, CONCENTRATE RESPIRATORY (INHALATION) at 06:11

## 2021-11-29 RX ADMIN — LEVALBUTEROL 1.25 MG: 1.25 SOLUTION, CONCENTRATE RESPIRATORY (INHALATION) at 03:11

## 2021-11-29 RX ADMIN — FUROSEMIDE 40 MG: 10 INJECTION, SOLUTION INTRAMUSCULAR; INTRAVENOUS at 09:11

## 2021-11-29 RX ADMIN — ONDANSETRON HYDROCHLORIDE 4 MG: 2 SOLUTION INTRAMUSCULAR; INTRAVENOUS at 01:11

## 2021-11-29 RX ADMIN — APIXABAN 5 MG: 5 TABLET, FILM COATED ORAL at 09:11

## 2021-11-29 RX ADMIN — METOPROLOL TARTRATE 50 MG: 50 TABLET, FILM COATED ORAL at 10:11

## 2021-11-29 RX ADMIN — ONDANSETRON HYDROCHLORIDE 4 MG: 2 SOLUTION INTRAMUSCULAR; INTRAVENOUS at 07:11

## 2021-11-29 RX ADMIN — ISOSORBIDE DINITRATE: 20 TABLET ORAL at 09:11

## 2021-11-29 RX ADMIN — SACUBITRIL AND VALSARTAN 1 TABLET: 24; 26 TABLET, FILM COATED ORAL at 09:11

## 2021-11-30 ENCOUNTER — HOSPITAL ENCOUNTER (INPATIENT)
Facility: HOSPITAL | Age: 56
LOS: 9 days | Discharge: HOME | DRG: 286 | End: 2021-12-09
Attending: INTERNAL MEDICINE | Admitting: INTERNAL MEDICINE
Payer: MEDICAID

## 2021-11-30 VITALS
HEART RATE: 81 BPM | BODY MASS INDEX: 35.65 KG/M2 | OXYGEN SATURATION: 98 % | SYSTOLIC BLOOD PRESSURE: 124 MMHG | TEMPERATURE: 98 F | WEIGHT: 220.88 LBS | RESPIRATION RATE: 17 BRPM | DIASTOLIC BLOOD PRESSURE: 76 MMHG

## 2021-11-30 DIAGNOSIS — I50.43 ACUTE ON CHRONIC COMBINED SYSTOLIC AND DIASTOLIC HEART FAILURE: ICD-10-CM

## 2021-11-30 DIAGNOSIS — I27.20 PULMONARY HYPERTENSION: Primary | ICD-10-CM

## 2021-11-30 DIAGNOSIS — I50.9 ACUTE DECOMPENSATED HEART FAILURE: ICD-10-CM

## 2021-11-30 DIAGNOSIS — I50.9 HEART FAILURE: ICD-10-CM

## 2021-11-30 DIAGNOSIS — N17.9 AKI (ACUTE KIDNEY INJURY): ICD-10-CM

## 2021-11-30 DIAGNOSIS — I47.20 V-TACH: ICD-10-CM

## 2021-11-30 DIAGNOSIS — I50.9 CHF (CONGESTIVE HEART FAILURE), NYHA CLASS IV: ICD-10-CM

## 2021-11-30 DIAGNOSIS — I49.9 IRREGULAR HEART RATE: ICD-10-CM

## 2021-11-30 LAB
ALBUMIN SERPL BCP-MCNC: 3.2 G/DL (ref 3.5–5.2)
ALP SERPL-CCNC: 49 U/L (ref 55–135)
ALT SERPL W/O P-5'-P-CCNC: 24 U/L (ref 10–44)
ANION GAP SERPL CALC-SCNC: 10 MMOL/L (ref 8–16)
ANISOCYTOSIS BLD QL SMEAR: ABNORMAL
ASCENDING AORTA: 3.85 CM
AST SERPL-CCNC: 37 U/L (ref 10–40)
AV INDEX (PROSTH): 0.57
AV MEAN GRADIENT: 7 MMHG
AV PEAK GRADIENT: 12 MMHG
AV VALVE AREA: 2.42 CM2
AV VELOCITY RATIO: 0.62
BASOPHILS # BLD AUTO: 0.03 K/UL (ref 0–0.2)
BASOPHILS NFR BLD: 0.5 % (ref 0–1.9)
BILIRUB SERPL-MCNC: 2.4 MG/DL (ref 0.1–1)
BNP SERPL-MCNC: 3482 PG/ML (ref 0–99)
BSA FOR ECHO PROCEDURE: 2.16 M2
BUN SERPL-MCNC: 50 MG/DL (ref 6–20)
CALCIUM SERPL-MCNC: 8.7 MG/DL (ref 8.7–10.5)
CHLORIDE SERPL-SCNC: 105 MMOL/L (ref 95–110)
CO2 SERPL-SCNC: 29 MMOL/L (ref 23–29)
CREAT SERPL-MCNC: 1.8 MG/DL (ref 0.5–1.4)
CV ECHO LV RWT: 0.4 CM
DIFFERENTIAL METHOD: ABNORMAL
DOP CALC AO PEAK VEL: 1.7 M/S
DOP CALC AO VTI: 26.09 CM
DOP CALC LVOT AREA: 4.2 CM2
DOP CALC LVOT DIAMETER: 2.32 CM
DOP CALC LVOT PEAK VEL: 1.06 M/S
DOP CALC LVOT STROKE VOLUME: 63.21 CM3
DOP CALCLVOT PEAK VEL VTI: 14.96 CM
E WAVE DECELERATION TIME: 194.4 MSEC
E/A RATIO: 1.09
E/E' RATIO: 21.56 M/S
ECHO LV POSTERIOR WALL: 1.37 CM (ref 0.6–1.1)
EJECTION FRACTION: 25 %
EOSINOPHIL # BLD AUTO: 0.1 K/UL (ref 0–0.5)
EOSINOPHIL NFR BLD: 2 % (ref 0–8)
ERYTHROCYTE [DISTWIDTH] IN BLOOD BY AUTOMATED COUNT: 30.9 % (ref 11.5–14.5)
EST. GFR  (AFRICAN AMERICAN): 35.8 ML/MIN/1.73 M^2
EST. GFR  (NON AFRICAN AMERICAN): 31 ML/MIN/1.73 M^2
FRACTIONAL SHORTENING: 22 % (ref 28–44)
GLUCOSE SERPL-MCNC: 123 MG/DL (ref 70–110)
HCT VFR BLD AUTO: 38.6 % (ref 37–48.5)
HGB BLD-MCNC: 10.8 G/DL (ref 12–16)
HYPOCHROMIA BLD QL SMEAR: ABNORMAL
IMM GRANULOCYTES # BLD AUTO: 0.06 K/UL (ref 0–0.04)
IMM GRANULOCYTES NFR BLD AUTO: 1 % (ref 0–0.5)
INR PPP: 1 (ref 0.8–1.2)
INTERVENTRICULAR SEPTUM: 1.58 CM (ref 0.6–1.1)
IVRT: 81.35 MSEC
LA MAJOR: 6.4 CM
LA MINOR: 6.6 CM
LA WIDTH: 3.9 CM
LEFT ATRIUM SIZE: 5.54 CM
LEFT ATRIUM VOLUME INDEX MOD: 34.5 ML/M2
LEFT ATRIUM VOLUME INDEX: 57.4 ML/M2
LEFT ATRIUM VOLUME MOD: 71.84 CM3
LEFT ATRIUM VOLUME: 119.34 CM3
LEFT INTERNAL DIMENSION IN SYSTOLE: 5.36 CM (ref 2.1–4)
LEFT VENTRICLE DIASTOLIC VOLUME INDEX: 117.22 ML/M2
LEFT VENTRICLE DIASTOLIC VOLUME: 243.82 ML
LEFT VENTRICLE MASS INDEX: 249 G/M2
LEFT VENTRICLE SYSTOLIC VOLUME INDEX: 66.7 ML/M2
LEFT VENTRICLE SYSTOLIC VOLUME: 138.8 ML
LEFT VENTRICULAR INTERNAL DIMENSION IN DIASTOLE: 6.86 CM (ref 3.5–6)
LEFT VENTRICULAR MASS: 517.48 G
LV LATERAL E/E' RATIO: 19.4 M/S
LV SEPTAL E/E' RATIO: 24.25 M/S
LYMPHOCYTES # BLD AUTO: 0.9 K/UL (ref 1–4.8)
LYMPHOCYTES NFR BLD: 14.9 % (ref 18–48)
MAGNESIUM SERPL-MCNC: 1.8 MG/DL (ref 1.6–2.6)
MAGNESIUM SERPL-MCNC: 2 MG/DL (ref 1.6–2.6)
MCH RBC QN AUTO: 18 PG (ref 27–31)
MCHC RBC AUTO-ENTMCNC: 28 G/DL (ref 32–36)
MCV RBC AUTO: 64 FL (ref 82–98)
MONOCYTES # BLD AUTO: 0.5 K/UL (ref 0.3–1)
MONOCYTES NFR BLD: 8.5 % (ref 4–15)
MV A" WAVE DURATION": 11.13 MSEC
MV PEAK A VEL: 0.89 M/S
MV PEAK E VEL: 0.97 M/S
MV STENOSIS PRESSURE HALF TIME: 56.38 MS
MV VALVE AREA P 1/2 METHOD: 3.9 CM2
NEUTROPHILS # BLD AUTO: 4.4 K/UL (ref 1.8–7.7)
NEUTROPHILS NFR BLD: 73.1 % (ref 38–73)
NRBC BLD-RTO: 2 /100 WBC
OVALOCYTES BLD QL SMEAR: ABNORMAL
PISA MRMAX VEL: 0.05 M/S
PISA RADIUS: 0.53 CM
PISA TR MAX VEL: 3.32 M/S
PISA TR VN NYQUIST: 0.01 M/S
PLATELET # BLD AUTO: 131 K/UL (ref 150–450)
PLATELET BLD QL SMEAR: ABNORMAL
PMV BLD AUTO: ABNORMAL FL (ref 9.2–12.9)
POCT GLUCOSE: 116 MG/DL (ref 70–110)
POCT GLUCOSE: 127 MG/DL (ref 70–110)
POCT GLUCOSE: 169 MG/DL (ref 70–110)
POCT GLUCOSE: 176 MG/DL (ref 70–110)
POIKILOCYTOSIS BLD QL SMEAR: ABNORMAL
POLYCHROMASIA BLD QL SMEAR: ABNORMAL
POTASSIUM SERPL-SCNC: 4.4 MMOL/L (ref 3.5–5.1)
POTASSIUM SERPL-SCNC: 4.5 MMOL/L (ref 3.5–5.1)
PROT SERPL-MCNC: 5.7 G/DL (ref 6–8.4)
PROTHROMBIN TIME: 10.7 SEC (ref 9–12.5)
PULM VEIN S/D RATIO: 0.45
PV PEAK D VEL: 0.49 M/S
PV PEAK S VEL: 0.22 M/S
RA MAJOR: 5.92 CM
RA PRESSURE: 15 MMHG
RA WIDTH: 3.94 CM
RBC # BLD AUTO: 5.99 M/UL (ref 4–5.4)
RIGHT VENTRICULAR END-DIASTOLIC DIMENSION: 4.56 CM
RV TISSUE DOPPLER FREE WALL SYSTOLIC VELOCITY 1 (APICAL 4 CHAMBER VIEW): 12.65 CM/S
SCHISTOCYTES BLD QL SMEAR: ABNORMAL
SINUS: 3.18 CM
SODIUM SERPL-SCNC: 144 MMOL/L (ref 136–145)
STJ: 2.91 CM
TARGETS BLD QL SMEAR: ABNORMAL
TDI LATERAL: 0.05 M/S
TDI SEPTAL: 0.04 M/S
TDI: 0.05 M/S
TR MAX PG: 44 MMHG
TRICUSPID ANNULAR PLANE SYSTOLIC EXCURSION: 1.9 CM
TROPONIN I SERPL DL<=0.01 NG/ML-MCNC: 3.15 NG/ML (ref 0–0.03)
TV REST PULMONARY ARTERY PRESSURE: 59 MMHG
WBC # BLD AUTO: 6.03 K/UL (ref 3.9–12.7)

## 2021-11-30 PROCEDURE — 93010 EKG 12-LEAD: ICD-10-PCS | Mod: 77,,, | Performed by: INTERNAL MEDICINE

## 2021-11-30 PROCEDURE — 83735 ASSAY OF MAGNESIUM: CPT | Mod: 91 | Performed by: NURSE PRACTITIONER

## 2021-11-30 PROCEDURE — 36415 COLL VENOUS BLD VENIPUNCTURE: CPT | Performed by: STUDENT IN AN ORGANIZED HEALTH CARE EDUCATION/TRAINING PROGRAM

## 2021-11-30 PROCEDURE — 93005 ELECTROCARDIOGRAM TRACING: CPT

## 2021-11-30 PROCEDURE — 80048 BASIC METABOLIC PNL TOTAL CA: CPT | Performed by: STUDENT IN AN ORGANIZED HEALTH CARE EDUCATION/TRAINING PROGRAM

## 2021-11-30 PROCEDURE — 99233 PR SUBSEQUENT HOSPITAL CARE,LEVL III: ICD-10-PCS | Mod: ,,, | Performed by: INTERNAL MEDICINE

## 2021-11-30 PROCEDURE — 63600175 PHARM REV CODE 636 W HCPCS: Performed by: STUDENT IN AN ORGANIZED HEALTH CARE EDUCATION/TRAINING PROGRAM

## 2021-11-30 PROCEDURE — 93010 ELECTROCARDIOGRAM REPORT: CPT | Mod: 59,,, | Performed by: INTERNAL MEDICINE

## 2021-11-30 PROCEDURE — 83735 ASSAY OF MAGNESIUM: CPT | Performed by: STUDENT IN AN ORGANIZED HEALTH CARE EDUCATION/TRAINING PROGRAM

## 2021-11-30 PROCEDURE — 93010 EKG 12-LEAD: ICD-10-PCS | Mod: 59,,, | Performed by: INTERNAL MEDICINE

## 2021-11-30 PROCEDURE — 93010 EKG 12-LEAD: ICD-10-PCS | Mod: ,,, | Performed by: INTERNAL MEDICINE

## 2021-11-30 PROCEDURE — 36415 COLL VENOUS BLD VENIPUNCTURE: CPT | Performed by: SURGERY

## 2021-11-30 PROCEDURE — 93010 ELECTROCARDIOGRAM REPORT: CPT | Mod: ,,, | Performed by: INTERNAL MEDICINE

## 2021-11-30 PROCEDURE — 25500020 PHARM REV CODE 255: Performed by: INTERNAL MEDICINE

## 2021-11-30 PROCEDURE — 27000221 HC OXYGEN, UP TO 24 HOURS

## 2021-11-30 PROCEDURE — 94640 AIRWAY INHALATION TREATMENT: CPT

## 2021-11-30 PROCEDURE — 25000242 PHARM REV CODE 250 ALT 637 W/ HCPCS: Performed by: STUDENT IN AN ORGANIZED HEALTH CARE EDUCATION/TRAINING PROGRAM

## 2021-11-30 PROCEDURE — 80053 COMPREHEN METABOLIC PANEL: CPT | Performed by: STUDENT IN AN ORGANIZED HEALTH CARE EDUCATION/TRAINING PROGRAM

## 2021-11-30 PROCEDURE — 36415 COLL VENOUS BLD VENIPUNCTURE: CPT | Performed by: NURSE PRACTITIONER

## 2021-11-30 PROCEDURE — 94761 N-INVAS EAR/PLS OXIMETRY MLT: CPT

## 2021-11-30 PROCEDURE — 85610 PROTHROMBIN TIME: CPT | Performed by: STUDENT IN AN ORGANIZED HEALTH CARE EDUCATION/TRAINING PROGRAM

## 2021-11-30 PROCEDURE — 25000003 PHARM REV CODE 250: Performed by: STUDENT IN AN ORGANIZED HEALTH CARE EDUCATION/TRAINING PROGRAM

## 2021-11-30 PROCEDURE — 25000003 PHARM REV CODE 250: Performed by: INTERNAL MEDICINE

## 2021-11-30 PROCEDURE — 85025 COMPLETE CBC W/AUTO DIFF WBC: CPT | Performed by: STUDENT IN AN ORGANIZED HEALTH CARE EDUCATION/TRAINING PROGRAM

## 2021-11-30 PROCEDURE — 83880 ASSAY OF NATRIURETIC PEPTIDE: CPT | Performed by: STUDENT IN AN ORGANIZED HEALTH CARE EDUCATION/TRAINING PROGRAM

## 2021-11-30 PROCEDURE — 99233 SBSQ HOSP IP/OBS HIGH 50: CPT | Mod: ,,, | Performed by: INTERNAL MEDICINE

## 2021-11-30 PROCEDURE — 84132 ASSAY OF SERUM POTASSIUM: CPT | Performed by: NURSE PRACTITIONER

## 2021-11-30 PROCEDURE — 84484 ASSAY OF TROPONIN QUANT: CPT | Performed by: SURGERY

## 2021-11-30 PROCEDURE — 93010 ELECTROCARDIOGRAM REPORT: CPT | Mod: 77,,, | Performed by: INTERNAL MEDICINE

## 2021-11-30 PROCEDURE — 20600001 HC STEP DOWN PRIVATE ROOM

## 2021-11-30 PROCEDURE — 25000003 PHARM REV CODE 250: Performed by: NURSE PRACTITIONER

## 2021-11-30 RX ORDER — MONTELUKAST SODIUM 10 MG/1
10 TABLET ORAL DAILY
Status: DISCONTINUED | OUTPATIENT
Start: 2021-11-30 | End: 2021-12-09 | Stop reason: HOSPADM

## 2021-11-30 RX ORDER — ALLOPURINOL 100 MG/1
100 TABLET ORAL DAILY
Status: DISCONTINUED | OUTPATIENT
Start: 2021-11-30 | End: 2021-12-09 | Stop reason: HOSPADM

## 2021-11-30 RX ORDER — ROPINIROLE 2 MG/1
4 TABLET, FILM COATED ORAL DAILY
Status: DISCONTINUED | OUTPATIENT
Start: 2021-11-30 | End: 2021-11-30

## 2021-11-30 RX ORDER — IBUPROFEN 200 MG
16 TABLET ORAL
Status: DISCONTINUED | OUTPATIENT
Start: 2021-11-30 | End: 2021-12-09 | Stop reason: HOSPADM

## 2021-11-30 RX ORDER — ACETAMINOPHEN 325 MG/1
650 TABLET ORAL EVERY 6 HOURS PRN
Status: DISCONTINUED | OUTPATIENT
Start: 2021-11-30 | End: 2021-12-09 | Stop reason: HOSPADM

## 2021-11-30 RX ORDER — ALPRAZOLAM 2 MG/1
.25-2 TABLET ORAL 2 TIMES DAILY PRN
Status: ON HOLD | COMMUNITY
Start: 2021-09-27 | End: 2023-03-29 | Stop reason: HOSPADM

## 2021-11-30 RX ORDER — LANOLIN ALCOHOL/MO/W.PET/CERES
400 CREAM (GRAM) TOPICAL 2 TIMES DAILY
Status: DISCONTINUED | OUTPATIENT
Start: 2021-11-30 | End: 2021-12-05

## 2021-11-30 RX ORDER — FUROSEMIDE 10 MG/ML
80 INJECTION INTRAMUSCULAR; INTRAVENOUS ONCE
Status: COMPLETED | OUTPATIENT
Start: 2021-11-30 | End: 2021-11-30

## 2021-11-30 RX ORDER — ERGOCALCIFEROL 1.25 MG/1
50000 CAPSULE ORAL
COMMUNITY
Start: 2021-10-06 | End: 2022-10-12 | Stop reason: SDUPTHER

## 2021-11-30 RX ORDER — INSULIN ASPART 100 [IU]/ML
0-5 INJECTION, SOLUTION INTRAVENOUS; SUBCUTANEOUS
Status: DISCONTINUED | OUTPATIENT
Start: 2021-11-30 | End: 2021-12-09 | Stop reason: HOSPADM

## 2021-11-30 RX ORDER — ATORVASTATIN CALCIUM 20 MG/1
40 TABLET, FILM COATED ORAL DAILY
Status: DISCONTINUED | OUTPATIENT
Start: 2021-11-30 | End: 2021-12-09 | Stop reason: HOSPADM

## 2021-11-30 RX ORDER — IPRATROPIUM BROMIDE AND ALBUTEROL SULFATE 2.5; .5 MG/3ML; MG/3ML
1 SOLUTION RESPIRATORY (INHALATION) EVERY 6 HOURS PRN
Status: DISCONTINUED | OUTPATIENT
Start: 2021-11-30 | End: 2021-12-09 | Stop reason: HOSPADM

## 2021-11-30 RX ORDER — IBUPROFEN 200 MG
24 TABLET ORAL
Status: DISCONTINUED | OUTPATIENT
Start: 2021-11-30 | End: 2021-12-09 | Stop reason: HOSPADM

## 2021-11-30 RX ORDER — PANTOPRAZOLE SODIUM 40 MG/1
40 TABLET, DELAYED RELEASE ORAL DAILY
Status: DISCONTINUED | OUTPATIENT
Start: 2021-11-30 | End: 2021-12-09 | Stop reason: HOSPADM

## 2021-11-30 RX ORDER — ROPINIROLE 2 MG/1
4 TABLET, FILM COATED ORAL NIGHTLY
Status: COMPLETED | OUTPATIENT
Start: 2021-11-30 | End: 2021-12-02

## 2021-11-30 RX ORDER — GLUCAGON 1 MG
1 KIT INJECTION
Status: DISCONTINUED | OUTPATIENT
Start: 2021-11-30 | End: 2021-12-09 | Stop reason: HOSPADM

## 2021-11-30 RX ORDER — FLUTICASONE FUROATE AND VILANTEROL 100; 25 UG/1; UG/1
1 POWDER RESPIRATORY (INHALATION) DAILY
Status: DISCONTINUED | OUTPATIENT
Start: 2021-11-30 | End: 2021-12-09 | Stop reason: HOSPADM

## 2021-11-30 RX ORDER — GUAIFENESIN 100 MG/5ML
200 SOLUTION ORAL EVERY 4 HOURS PRN
Status: DISCONTINUED | OUTPATIENT
Start: 2021-11-30 | End: 2021-12-09 | Stop reason: HOSPADM

## 2021-11-30 RX ORDER — FLUTICASONE PROPIONATE 50 MCG
2 SPRAY, SUSPENSION (ML) NASAL DAILY PRN
Status: DISCONTINUED | OUTPATIENT
Start: 2021-11-30 | End: 2021-12-09 | Stop reason: HOSPADM

## 2021-11-30 RX ORDER — SODIUM CHLORIDE 0.9 % (FLUSH) 0.9 %
10 SYRINGE (ML) INJECTION
Status: DISCONTINUED | OUTPATIENT
Start: 2021-11-30 | End: 2021-12-09 | Stop reason: HOSPADM

## 2021-11-30 RX ORDER — METOPROLOL SUCCINATE 50 MG/1
100 TABLET, EXTENDED RELEASE ORAL 2 TIMES DAILY
Status: DISCONTINUED | OUTPATIENT
Start: 2021-11-30 | End: 2021-12-09 | Stop reason: HOSPADM

## 2021-11-30 RX ADMIN — ROPINIROLE HYDROCHLORIDE 4 MG: 2 TABLET, FILM COATED ORAL at 10:11

## 2021-11-30 RX ADMIN — ISOSORBIDE DINITRATE: 20 TABLET ORAL at 08:11

## 2021-11-30 RX ADMIN — SACUBITRIL AND VALSARTAN 1 TABLET: 24; 26 TABLET, FILM COATED ORAL at 08:11

## 2021-11-30 RX ADMIN — METOPROLOL SUCCINATE 100 MG: 50 TABLET, EXTENDED RELEASE ORAL at 08:11

## 2021-11-30 RX ADMIN — Medication 400 MG: at 08:11

## 2021-11-30 RX ADMIN — ISOSORBIDE DINITRATE: 20 TABLET ORAL at 09:11

## 2021-11-30 RX ADMIN — ATORVASTATIN CALCIUM 40 MG: 20 TABLET, FILM COATED ORAL at 08:11

## 2021-11-30 RX ADMIN — APIXABAN 5 MG: 5 TABLET, FILM COATED ORAL at 08:11

## 2021-11-30 RX ADMIN — ROPINIROLE HYDROCHLORIDE 4 MG: 2 TABLET, FILM COATED ORAL at 08:11

## 2021-11-30 RX ADMIN — GUAIFENESIN 200 MG: 100 SOLUTION ORAL at 04:11

## 2021-11-30 RX ADMIN — PANTOPRAZOLE SODIUM 40 MG: 40 TABLET, DELAYED RELEASE ORAL at 08:11

## 2021-11-30 RX ADMIN — HUMAN ALBUMIN MICROSPHERES AND PERFLUTREN 0.66 MG: 10; .22 INJECTION, SOLUTION INTRAVENOUS at 02:11

## 2021-11-30 RX ADMIN — TIOTROPIUM BROMIDE INHALATION SPRAY 2 PUFF: 3.12 SPRAY, METERED RESPIRATORY (INHALATION) at 08:11

## 2021-11-30 RX ADMIN — ACETAMINOPHEN 650 MG: 325 TABLET ORAL at 10:11

## 2021-11-30 RX ADMIN — FLUTICASONE FUROATE AND VILANTEROL TRIFENATATE 1 PUFF: 100; 25 POWDER RESPIRATORY (INHALATION) at 08:11

## 2021-11-30 RX ADMIN — FUROSEMIDE 80 MG: 10 INJECTION, SOLUTION INTRAVENOUS at 05:11

## 2021-11-30 RX ADMIN — ACETAMINOPHEN 650 MG: 325 TABLET ORAL at 05:11

## 2021-11-30 RX ADMIN — FUROSEMIDE 10 MG/HR: 10 INJECTION, SOLUTION INTRAMUSCULAR; INTRAVENOUS at 05:11

## 2021-11-30 RX ADMIN — ALLOPURINOL 100 MG: 100 TABLET ORAL at 08:11

## 2021-11-30 RX ADMIN — MONTELUKAST 10 MG: 10 TABLET, FILM COATED ORAL at 08:11

## 2021-12-01 ENCOUNTER — TELEPHONE (OUTPATIENT)
Dept: TRANSPLANT | Facility: CLINIC | Age: 56
End: 2021-12-01
Payer: MEDICAID

## 2021-12-01 LAB
ABO + RH BLD: NORMAL
ALBUMIN SERPL BCP-MCNC: 3.3 G/DL (ref 3.5–5.2)
ALP SERPL-CCNC: 43 U/L (ref 55–135)
ALT SERPL W/O P-5'-P-CCNC: 17 U/L (ref 10–44)
ANION GAP SERPL CALC-SCNC: 10 MMOL/L (ref 8–16)
ANION GAP SERPL CALC-SCNC: 10 MMOL/L (ref 8–16)
ANION GAP SERPL CALC-SCNC: 11 MMOL/L (ref 8–16)
ANISOCYTOSIS BLD QL SMEAR: ABNORMAL
AST SERPL-CCNC: 19 U/L (ref 10–40)
BASOPHILS # BLD AUTO: 0.03 K/UL (ref 0–0.2)
BASOPHILS NFR BLD: 0.7 % (ref 0–1.9)
BILIRUB DIRECT SERPL-MCNC: 0.5 MG/DL (ref 0.1–0.3)
BILIRUB SERPL-MCNC: 3.1 MG/DL (ref 0.1–1)
BLD GP AB SCN CELLS X3 SERPL QL: NORMAL
BUN SERPL-MCNC: 40 MG/DL (ref 6–20)
BUN SERPL-MCNC: 43 MG/DL (ref 6–20)
BUN SERPL-MCNC: 44 MG/DL (ref 6–20)
CALCIUM SERPL-MCNC: 8.3 MG/DL (ref 8.7–10.5)
CALCIUM SERPL-MCNC: 8.7 MG/DL (ref 8.7–10.5)
CALCIUM SERPL-MCNC: 8.8 MG/DL (ref 8.7–10.5)
CHLORIDE SERPL-SCNC: 100 MMOL/L (ref 95–110)
CHLORIDE SERPL-SCNC: 102 MMOL/L (ref 95–110)
CHLORIDE SERPL-SCNC: 104 MMOL/L (ref 95–110)
CO2 SERPL-SCNC: 29 MMOL/L (ref 23–29)
CO2 SERPL-SCNC: 31 MMOL/L (ref 23–29)
CO2 SERPL-SCNC: 31 MMOL/L (ref 23–29)
CREAT SERPL-MCNC: 1.6 MG/DL (ref 0.5–1.4)
CREAT SERPL-MCNC: 1.6 MG/DL (ref 0.5–1.4)
CREAT SERPL-MCNC: 1.8 MG/DL (ref 0.5–1.4)
DACRYOCYTES BLD QL SMEAR: ABNORMAL
DIFFERENTIAL METHOD: ABNORMAL
EOSINOPHIL # BLD AUTO: 0.1 K/UL (ref 0–0.5)
EOSINOPHIL NFR BLD: 3 % (ref 0–8)
ERYTHROCYTE [DISTWIDTH] IN BLOOD BY AUTOMATED COUNT: 30.5 % (ref 11.5–14.5)
EST. GFR  (AFRICAN AMERICAN): 35.8 ML/MIN/1.73 M^2
EST. GFR  (AFRICAN AMERICAN): 41.2 ML/MIN/1.73 M^2
EST. GFR  (AFRICAN AMERICAN): 41.2 ML/MIN/1.73 M^2
EST. GFR  (NON AFRICAN AMERICAN): 31 ML/MIN/1.73 M^2
EST. GFR  (NON AFRICAN AMERICAN): 35.8 ML/MIN/1.73 M^2
EST. GFR  (NON AFRICAN AMERICAN): 35.8 ML/MIN/1.73 M^2
GLUCOSE SERPL-MCNC: 113 MG/DL (ref 70–110)
GLUCOSE SERPL-MCNC: 140 MG/DL (ref 70–110)
GLUCOSE SERPL-MCNC: 163 MG/DL (ref 70–110)
HCG INTACT+B SERPL-ACNC: <2.4 MIU/ML
HCT VFR BLD AUTO: 39.7 % (ref 37–48.5)
HGB BLD-MCNC: 10.7 G/DL (ref 12–16)
HOWELL-JOLLY BOD BLD QL SMEAR: ABNORMAL
HYPOCHROMIA BLD QL SMEAR: ABNORMAL
IMM GRANULOCYTES # BLD AUTO: 0.03 K/UL (ref 0–0.04)
IMM GRANULOCYTES NFR BLD AUTO: 0.7 % (ref 0–0.5)
LYMPHOCYTES # BLD AUTO: 0.8 K/UL (ref 1–4.8)
LYMPHOCYTES NFR BLD: 20.2 % (ref 18–48)
MAGNESIUM SERPL-MCNC: 1.8 MG/DL (ref 1.6–2.6)
MAGNESIUM SERPL-MCNC: 2.2 MG/DL (ref 1.6–2.6)
MAGNESIUM SERPL-MCNC: 2.3 MG/DL (ref 1.6–2.6)
MCH RBC QN AUTO: 17.4 PG (ref 27–31)
MCHC RBC AUTO-ENTMCNC: 27 G/DL (ref 32–36)
MCV RBC AUTO: 65 FL (ref 82–98)
MONOCYTES # BLD AUTO: 0.3 K/UL (ref 0.3–1)
MONOCYTES NFR BLD: 8.1 % (ref 4–15)
NEUTROPHILS # BLD AUTO: 2.7 K/UL (ref 1.8–7.7)
NEUTROPHILS NFR BLD: 67.3 % (ref 38–73)
NRBC BLD-RTO: 2 /100 WBC
OVALOCYTES BLD QL SMEAR: ABNORMAL
PLATELET # BLD AUTO: 130 K/UL (ref 150–450)
PLATELET BLD QL SMEAR: ABNORMAL
PMV BLD AUTO: ABNORMAL FL (ref 9.2–12.9)
POCT GLUCOSE: 117 MG/DL (ref 70–110)
POCT GLUCOSE: 135 MG/DL (ref 70–110)
POCT GLUCOSE: 150 MG/DL (ref 70–110)
POIKILOCYTOSIS BLD QL SMEAR: ABNORMAL
POLYCHROMASIA BLD QL SMEAR: ABNORMAL
POTASSIUM SERPL-SCNC: 3.9 MMOL/L (ref 3.5–5.1)
POTASSIUM SERPL-SCNC: 4 MMOL/L (ref 3.5–5.1)
POTASSIUM SERPL-SCNC: 4 MMOL/L (ref 3.5–5.1)
PROT SERPL-MCNC: 5.7 G/DL (ref 6–8.4)
RBC # BLD AUTO: 6.14 M/UL (ref 4–5.4)
SCHISTOCYTES BLD QL SMEAR: ABNORMAL
SODIUM SERPL-SCNC: 141 MMOL/L (ref 136–145)
SODIUM SERPL-SCNC: 143 MMOL/L (ref 136–145)
SODIUM SERPL-SCNC: 144 MMOL/L (ref 136–145)
SPHEROCYTES BLD QL SMEAR: ABNORMAL
STOMATOCYTES BLD QL SMEAR: ABNORMAL
TARGETS BLD QL SMEAR: ABNORMAL
WBC # BLD AUTO: 4.05 K/UL (ref 3.9–12.7)

## 2021-12-01 PROCEDURE — 80048 BASIC METABOLIC PNL TOTAL CA: CPT | Mod: 91 | Performed by: INTERNAL MEDICINE

## 2021-12-01 PROCEDURE — 86900 BLOOD TYPING SEROLOGIC ABO: CPT | Performed by: PHYSICIAN ASSISTANT

## 2021-12-01 PROCEDURE — 80048 BASIC METABOLIC PNL TOTAL CA: CPT | Performed by: PHYSICIAN ASSISTANT

## 2021-12-01 PROCEDURE — 84702 CHORIONIC GONADOTROPIN TEST: CPT | Performed by: PHYSICIAN ASSISTANT

## 2021-12-01 PROCEDURE — 93005 ELECTROCARDIOGRAM TRACING: CPT

## 2021-12-01 PROCEDURE — 36415 COLL VENOUS BLD VENIPUNCTURE: CPT | Performed by: PHYSICIAN ASSISTANT

## 2021-12-01 PROCEDURE — 80076 HEPATIC FUNCTION PANEL: CPT | Performed by: PHYSICIAN ASSISTANT

## 2021-12-01 PROCEDURE — 36415 COLL VENOUS BLD VENIPUNCTURE: CPT | Performed by: INTERNAL MEDICINE

## 2021-12-01 PROCEDURE — 83735 ASSAY OF MAGNESIUM: CPT | Performed by: PHYSICIAN ASSISTANT

## 2021-12-01 PROCEDURE — 20600001 HC STEP DOWN PRIVATE ROOM

## 2021-12-01 PROCEDURE — 99233 SBSQ HOSP IP/OBS HIGH 50: CPT | Mod: ,,, | Performed by: INTERNAL MEDICINE

## 2021-12-01 PROCEDURE — 93010 ELECTROCARDIOGRAM REPORT: CPT | Mod: ,,, | Performed by: INTERNAL MEDICINE

## 2021-12-01 PROCEDURE — 83735 ASSAY OF MAGNESIUM: CPT | Mod: 91 | Performed by: INTERNAL MEDICINE

## 2021-12-01 PROCEDURE — 99233 SBSQ HOSP IP/OBS HIGH 50: CPT | Mod: ,,, | Performed by: PHYSICIAN ASSISTANT

## 2021-12-01 PROCEDURE — 99233 PR SUBSEQUENT HOSPITAL CARE,LEVL III: ICD-10-PCS | Mod: ,,, | Performed by: PHYSICIAN ASSISTANT

## 2021-12-01 PROCEDURE — 93010 EKG 12-LEAD: ICD-10-PCS | Mod: ,,, | Performed by: INTERNAL MEDICINE

## 2021-12-01 PROCEDURE — 85025 COMPLETE CBC W/AUTO DIFF WBC: CPT | Performed by: PHYSICIAN ASSISTANT

## 2021-12-01 PROCEDURE — 25000003 PHARM REV CODE 250: Performed by: STUDENT IN AN ORGANIZED HEALTH CARE EDUCATION/TRAINING PROGRAM

## 2021-12-01 PROCEDURE — 63600175 PHARM REV CODE 636 W HCPCS: Performed by: STUDENT IN AN ORGANIZED HEALTH CARE EDUCATION/TRAINING PROGRAM

## 2021-12-01 PROCEDURE — 99233 PR SUBSEQUENT HOSPITAL CARE,LEVL III: ICD-10-PCS | Mod: ,,, | Performed by: INTERNAL MEDICINE

## 2021-12-01 PROCEDURE — 25000003 PHARM REV CODE 250: Performed by: NURSE PRACTITIONER

## 2021-12-01 PROCEDURE — 25000003 PHARM REV CODE 250: Performed by: INTERNAL MEDICINE

## 2021-12-01 RX ORDER — PROCHLORPERAZINE EDISYLATE 5 MG/ML
2.5 INJECTION INTRAMUSCULAR; INTRAVENOUS EVERY 6 HOURS PRN
Status: DISCONTINUED | OUTPATIENT
Start: 2021-12-01 | End: 2021-12-03

## 2021-12-01 RX ORDER — MAGNESIUM SULFATE HEPTAHYDRATE 40 MG/ML
2 INJECTION, SOLUTION INTRAVENOUS ONCE
Status: COMPLETED | OUTPATIENT
Start: 2021-12-01 | End: 2021-12-01

## 2021-12-01 RX ADMIN — SACUBITRIL AND VALSARTAN 1 TABLET: 24; 26 TABLET, FILM COATED ORAL at 08:12

## 2021-12-01 RX ADMIN — APIXABAN 5 MG: 5 TABLET, FILM COATED ORAL at 09:12

## 2021-12-01 RX ADMIN — METOPROLOL SUCCINATE 100 MG: 50 TABLET, EXTENDED RELEASE ORAL at 09:12

## 2021-12-01 RX ADMIN — SACUBITRIL AND VALSARTAN 1 TABLET: 24; 26 TABLET, FILM COATED ORAL at 09:12

## 2021-12-01 RX ADMIN — MONTELUKAST 10 MG: 10 TABLET, FILM COATED ORAL at 08:12

## 2021-12-01 RX ADMIN — Medication 400 MG: at 09:12

## 2021-12-01 RX ADMIN — FUROSEMIDE 10 MG/HR: 10 INJECTION, SOLUTION INTRAMUSCULAR; INTRAVENOUS at 02:12

## 2021-12-01 RX ADMIN — MAGNESIUM SULFATE 2 G: 2 INJECTION INTRAVENOUS at 12:12

## 2021-12-01 RX ADMIN — ATORVASTATIN CALCIUM 40 MG: 20 TABLET, FILM COATED ORAL at 08:12

## 2021-12-01 RX ADMIN — GUAIFENESIN 200 MG: 100 SOLUTION ORAL at 08:12

## 2021-12-01 RX ADMIN — Medication 400 MG: at 08:12

## 2021-12-01 RX ADMIN — FLUTICASONE FUROATE AND VILANTEROL TRIFENATATE 1 PUFF: 100; 25 POWDER RESPIRATORY (INHALATION) at 08:12

## 2021-12-01 RX ADMIN — APIXABAN 5 MG: 5 TABLET, FILM COATED ORAL at 08:12

## 2021-12-01 RX ADMIN — ROPINIROLE HYDROCHLORIDE 4 MG: 2 TABLET, FILM COATED ORAL at 09:12

## 2021-12-01 RX ADMIN — PANTOPRAZOLE SODIUM 40 MG: 40 TABLET, DELAYED RELEASE ORAL at 08:12

## 2021-12-01 RX ADMIN — ISOSORBIDE DINITRATE: 20 TABLET ORAL at 09:12

## 2021-12-01 RX ADMIN — PROCHLORPERAZINE EDISYLATE 2.5 MG: 5 INJECTION INTRAMUSCULAR; INTRAVENOUS at 10:12

## 2021-12-01 RX ADMIN — TIOTROPIUM BROMIDE INHALATION SPRAY 2 PUFF: 3.12 SPRAY, METERED RESPIRATORY (INHALATION) at 08:12

## 2021-12-01 RX ADMIN — METOPROLOL SUCCINATE 100 MG: 50 TABLET, EXTENDED RELEASE ORAL at 08:12

## 2021-12-02 ENCOUNTER — TELEPHONE (OUTPATIENT)
Dept: TRANSPLANT | Facility: CLINIC | Age: 56
End: 2021-12-02
Payer: MEDICAID

## 2021-12-02 ENCOUNTER — PATIENT MESSAGE (OUTPATIENT)
Dept: ADMINISTRATIVE | Facility: HOSPITAL | Age: 56
End: 2021-12-02
Payer: MEDICAID

## 2021-12-02 PROBLEM — I50.43 ACUTE ON CHRONIC COMBINED SYSTOLIC AND DIASTOLIC HEART FAILURE: Status: ACTIVE | Noted: 2021-11-30

## 2021-12-02 LAB
ANION GAP SERPL CALC-SCNC: 9 MMOL/L (ref 8–16)
ANISOCYTOSIS BLD QL SMEAR: ABNORMAL
BASOPHILS # BLD AUTO: 0.03 K/UL (ref 0–0.2)
BASOPHILS NFR BLD: 0.7 % (ref 0–1.9)
BUN SERPL-MCNC: 42 MG/DL (ref 6–20)
CALCIUM SERPL-MCNC: 8.9 MG/DL (ref 8.7–10.5)
CHLORIDE SERPL-SCNC: 99 MMOL/L (ref 95–110)
CO2 SERPL-SCNC: 35 MMOL/L (ref 23–29)
CREAT SERPL-MCNC: 1.6 MG/DL (ref 0.5–1.4)
DACRYOCYTES BLD QL SMEAR: ABNORMAL
DIFFERENTIAL METHOD: ABNORMAL
EOSINOPHIL # BLD AUTO: 0.1 K/UL (ref 0–0.5)
EOSINOPHIL NFR BLD: 2.9 % (ref 0–8)
ERYTHROCYTE [DISTWIDTH] IN BLOOD BY AUTOMATED COUNT: 29.7 % (ref 11.5–14.5)
EST. GFR  (AFRICAN AMERICAN): 41.2 ML/MIN/1.73 M^2
EST. GFR  (NON AFRICAN AMERICAN): 35.8 ML/MIN/1.73 M^2
GLUCOSE SERPL-MCNC: 117 MG/DL (ref 70–110)
HCT VFR BLD AUTO: 36.5 % (ref 37–48.5)
HGB BLD-MCNC: 10.1 G/DL (ref 12–16)
HYPOCHROMIA BLD QL SMEAR: ABNORMAL
IMM GRANULOCYTES # BLD AUTO: 0.03 K/UL (ref 0–0.04)
IMM GRANULOCYTES NFR BLD AUTO: 0.7 % (ref 0–0.5)
LYMPHOCYTES # BLD AUTO: 1 K/UL (ref 1–4.8)
LYMPHOCYTES NFR BLD: 21.4 % (ref 18–48)
MAGNESIUM SERPL-MCNC: 2.1 MG/DL (ref 1.6–2.6)
MCH RBC QN AUTO: 17.3 PG (ref 27–31)
MCHC RBC AUTO-ENTMCNC: 27.7 G/DL (ref 32–36)
MCV RBC AUTO: 63 FL (ref 82–98)
MONOCYTES # BLD AUTO: 0.5 K/UL (ref 0.3–1)
MONOCYTES NFR BLD: 11.1 % (ref 4–15)
NEUTROPHILS # BLD AUTO: 2.8 K/UL (ref 1.8–7.7)
NEUTROPHILS NFR BLD: 63.2 % (ref 38–73)
NRBC BLD-RTO: 1 /100 WBC
OVALOCYTES BLD QL SMEAR: ABNORMAL
PLATELET # BLD AUTO: 128 K/UL (ref 150–450)
PLATELET BLD QL SMEAR: ABNORMAL
PMV BLD AUTO: ABNORMAL FL (ref 9.2–12.9)
POCT GLUCOSE: 129 MG/DL (ref 70–110)
POCT GLUCOSE: 162 MG/DL (ref 70–110)
POIKILOCYTOSIS BLD QL SMEAR: ABNORMAL
POLYCHROMASIA BLD QL SMEAR: ABNORMAL
POTASSIUM SERPL-SCNC: 4.1 MMOL/L (ref 3.5–5.1)
RBC # BLD AUTO: 5.83 M/UL (ref 4–5.4)
SCHISTOCYTES BLD QL SMEAR: ABNORMAL
SCHISTOCYTES BLD QL SMEAR: PRESENT
SODIUM SERPL-SCNC: 143 MMOL/L (ref 136–145)
SPHEROCYTES BLD QL SMEAR: ABNORMAL
TARGETS BLD QL SMEAR: ABNORMAL
WBC # BLD AUTO: 4.43 K/UL (ref 3.9–12.7)

## 2021-12-02 PROCEDURE — 20600001 HC STEP DOWN PRIVATE ROOM

## 2021-12-02 PROCEDURE — 25000003 PHARM REV CODE 250: Performed by: STUDENT IN AN ORGANIZED HEALTH CARE EDUCATION/TRAINING PROGRAM

## 2021-12-02 PROCEDURE — 85025 COMPLETE CBC W/AUTO DIFF WBC: CPT | Performed by: PHYSICIAN ASSISTANT

## 2021-12-02 PROCEDURE — 25000003 PHARM REV CODE 250: Performed by: PHYSICIAN ASSISTANT

## 2021-12-02 PROCEDURE — 80048 BASIC METABOLIC PNL TOTAL CA: CPT | Performed by: PHYSICIAN ASSISTANT

## 2021-12-02 PROCEDURE — 94640 AIRWAY INHALATION TREATMENT: CPT

## 2021-12-02 PROCEDURE — 97116 GAIT TRAINING THERAPY: CPT

## 2021-12-02 PROCEDURE — 25000003 PHARM REV CODE 250: Performed by: INTERNAL MEDICINE

## 2021-12-02 PROCEDURE — 94761 N-INVAS EAR/PLS OXIMETRY MLT: CPT

## 2021-12-02 PROCEDURE — 99900035 HC TECH TIME PER 15 MIN (STAT)

## 2021-12-02 PROCEDURE — 63600175 PHARM REV CODE 636 W HCPCS: Performed by: PHYSICIAN ASSISTANT

## 2021-12-02 PROCEDURE — 97165 OT EVAL LOW COMPLEX 30 MIN: CPT

## 2021-12-02 PROCEDURE — 25000003 PHARM REV CODE 250: Performed by: NURSE PRACTITIONER

## 2021-12-02 PROCEDURE — 36415 COLL VENOUS BLD VENIPUNCTURE: CPT | Performed by: PHYSICIAN ASSISTANT

## 2021-12-02 PROCEDURE — 27000221 HC OXYGEN, UP TO 24 HOURS

## 2021-12-02 PROCEDURE — 97530 THERAPEUTIC ACTIVITIES: CPT

## 2021-12-02 PROCEDURE — 99233 PR SUBSEQUENT HOSPITAL CARE,LEVL III: ICD-10-PCS | Mod: 95,,, | Performed by: PHYSICIAN ASSISTANT

## 2021-12-02 PROCEDURE — 97535 SELF CARE MNGMENT TRAINING: CPT

## 2021-12-02 PROCEDURE — 99233 SBSQ HOSP IP/OBS HIGH 50: CPT | Mod: 95,,, | Performed by: PHYSICIAN ASSISTANT

## 2021-12-02 PROCEDURE — 97161 PT EVAL LOW COMPLEX 20 MIN: CPT

## 2021-12-02 PROCEDURE — 63600175 PHARM REV CODE 636 W HCPCS: Performed by: STUDENT IN AN ORGANIZED HEALTH CARE EDUCATION/TRAINING PROGRAM

## 2021-12-02 PROCEDURE — 83735 ASSAY OF MAGNESIUM: CPT | Performed by: PHYSICIAN ASSISTANT

## 2021-12-02 RX ORDER — FUROSEMIDE 10 MG/ML
80 INJECTION INTRAMUSCULAR; INTRAVENOUS ONCE
Status: COMPLETED | OUTPATIENT
Start: 2021-12-02 | End: 2021-12-02

## 2021-12-02 RX ORDER — SERTRALINE HYDROCHLORIDE 25 MG/1
25 TABLET, FILM COATED ORAL DAILY
Status: DISCONTINUED | OUTPATIENT
Start: 2021-12-02 | End: 2021-12-08

## 2021-12-02 RX ADMIN — PROCHLORPERAZINE EDISYLATE 2.5 MG: 5 INJECTION INTRAMUSCULAR; INTRAVENOUS at 10:12

## 2021-12-02 RX ADMIN — APIXABAN 5 MG: 5 TABLET, FILM COATED ORAL at 09:12

## 2021-12-02 RX ADMIN — SACUBITRIL AND VALSARTAN 1 TABLET: 24; 26 TABLET, FILM COATED ORAL at 09:12

## 2021-12-02 RX ADMIN — Medication 400 MG: at 09:12

## 2021-12-02 RX ADMIN — ROPINIROLE HYDROCHLORIDE 4 MG: 2 TABLET, FILM COATED ORAL at 09:12

## 2021-12-02 RX ADMIN — FUROSEMIDE 80 MG: 10 INJECTION, SOLUTION INTRAVENOUS at 11:12

## 2021-12-02 RX ADMIN — SERTRALINE HYDROCHLORIDE 25 MG: 25 TABLET ORAL at 11:12

## 2021-12-02 RX ADMIN — FUROSEMIDE 20 MG/HR: 10 INJECTION, SOLUTION INTRAMUSCULAR; INTRAVENOUS at 09:12

## 2021-12-02 RX ADMIN — METOPROLOL SUCCINATE 100 MG: 50 TABLET, EXTENDED RELEASE ORAL at 09:12

## 2021-12-02 RX ADMIN — ISOSORBIDE DINITRATE: 20 TABLET ORAL at 11:12

## 2021-12-02 RX ADMIN — APIXABAN 5 MG: 5 TABLET, FILM COATED ORAL at 08:12

## 2021-12-02 RX ADMIN — ATORVASTATIN CALCIUM 40 MG: 20 TABLET, FILM COATED ORAL at 08:12

## 2021-12-02 RX ADMIN — Medication 400 MG: at 08:12

## 2021-12-02 RX ADMIN — ISOSORBIDE DINITRATE: 20 TABLET ORAL at 10:12

## 2021-12-02 RX ADMIN — FLUTICASONE FUROATE AND VILANTEROL TRIFENATATE 1 PUFF: 100; 25 POWDER RESPIRATORY (INHALATION) at 08:12

## 2021-12-02 RX ADMIN — PANTOPRAZOLE SODIUM 40 MG: 40 TABLET, DELAYED RELEASE ORAL at 08:12

## 2021-12-02 RX ADMIN — METOPROLOL SUCCINATE 100 MG: 50 TABLET, EXTENDED RELEASE ORAL at 08:12

## 2021-12-02 RX ADMIN — MONTELUKAST 10 MG: 10 TABLET, FILM COATED ORAL at 08:12

## 2021-12-02 RX ADMIN — TIOTROPIUM BROMIDE INHALATION SPRAY 2 PUFF: 3.12 SPRAY, METERED RESPIRATORY (INHALATION) at 08:12

## 2021-12-02 RX ADMIN — SACUBITRIL AND VALSARTAN 1 TABLET: 24; 26 TABLET, FILM COATED ORAL at 08:12

## 2021-12-02 RX ADMIN — ALLOPURINOL 100 MG: 100 TABLET ORAL at 08:12

## 2021-12-03 ENCOUNTER — SOCIAL WORK (OUTPATIENT)
Dept: TRANSPLANT | Facility: CLINIC | Age: 56
End: 2021-12-03
Payer: MEDICAID

## 2021-12-03 ENCOUNTER — EDUCATION (OUTPATIENT)
Dept: TRANSPLANT | Facility: CLINIC | Age: 56
End: 2021-12-03
Payer: MEDICAID

## 2021-12-03 LAB
ABORH REPEAT: NORMAL
ACANTHOCYTES BLD QL SMEAR: PRESENT
ALBUMIN SERPL BCP-MCNC: 3.4 G/DL (ref 3.5–5.2)
ALP SERPL-CCNC: 45 U/L (ref 55–135)
ALT SERPL W/O P-5'-P-CCNC: 20 U/L (ref 10–44)
ANION GAP SERPL CALC-SCNC: 12 MMOL/L (ref 8–16)
ANISOCYTOSIS BLD QL SMEAR: ABNORMAL
AST SERPL-CCNC: 21 U/L (ref 10–40)
BASOPHILS # BLD AUTO: 0.03 K/UL (ref 0–0.2)
BASOPHILS NFR BLD: 0.8 % (ref 0–1.9)
BILIRUB DIRECT SERPL-MCNC: 0.4 MG/DL (ref 0.1–0.3)
BILIRUB DIRECT SERPL-MCNC: 0.4 MG/DL (ref 0.1–0.3)
BILIRUB SERPL-MCNC: 3.5 MG/DL (ref 0.1–1)
BNP SERPL-MCNC: 4618 PG/ML (ref 0–99)
BUN SERPL-MCNC: 42 MG/DL (ref 6–20)
CALCIUM SERPL-MCNC: 9.5 MG/DL (ref 8.7–10.5)
CHLORIDE SERPL-SCNC: 100 MMOL/L (ref 95–110)
CHOLEST SERPL-MCNC: 110 MG/DL (ref 120–199)
CHOLEST/HDLC SERPL: 1.8 {RATIO} (ref 2–5)
CO2 SERPL-SCNC: 33 MMOL/L (ref 23–29)
CREAT SERPL-MCNC: 1.5 MG/DL (ref 0.5–1.4)
DACRYOCYTES BLD QL SMEAR: ABNORMAL
DIFFERENTIAL METHOD: ABNORMAL
EOSINOPHIL # BLD AUTO: 0.1 K/UL (ref 0–0.5)
EOSINOPHIL NFR BLD: 2.7 % (ref 0–8)
ERYTHROCYTE [DISTWIDTH] IN BLOOD BY AUTOMATED COUNT: 29.6 % (ref 11.5–14.5)
EST. GFR  (AFRICAN AMERICAN): 44.6 ML/MIN/1.73 M^2
EST. GFR  (NON AFRICAN AMERICAN): 38.7 ML/MIN/1.73 M^2
ESTIMATED AVG GLUCOSE: 105 MG/DL (ref 68–131)
FERRITIN SERPL-MCNC: 125 NG/ML (ref 20–300)
GLUCOSE SERPL-MCNC: 114 MG/DL (ref 70–110)
HAV IGG SER QL IA: NEGATIVE
HBA1C MFR BLD: 5.3 % (ref 4–5.6)
HBV CORE AB SERPL QL IA: NEGATIVE
HBV SURFACE AB SER-ACNC: NEGATIVE M[IU]/ML
HBV SURFACE AG SERPL QL IA: NEGATIVE
HCT VFR BLD AUTO: 38.9 % (ref 37–48.5)
HCV AB SERPL QL IA: NEGATIVE
HDLC SERPL-MCNC: 62 MG/DL (ref 40–75)
HDLC SERPL: 56.4 % (ref 20–50)
HGB BLD-MCNC: 11 G/DL (ref 12–16)
HIV 1+2 AB+HIV1 P24 AG SERPL QL IA: NEGATIVE
HYPOCHROMIA BLD QL SMEAR: ABNORMAL
IMM GRANULOCYTES # BLD AUTO: 0.01 K/UL (ref 0–0.04)
IMM GRANULOCYTES NFR BLD AUTO: 0.3 % (ref 0–0.5)
IRON SERPL-MCNC: 75 UG/DL (ref 30–160)
LDH SERPL L TO P-CCNC: 350 U/L (ref 110–260)
LDLC SERPL CALC-MCNC: 33.2 MG/DL (ref 63–159)
LEFT CBA DIAS: 14 CM/S
LEFT CBA SYS: 45 CM/S
LEFT CCA DIST DIAS: 12 CM/S
LEFT CCA DIST SYS: 51 CM/S
LEFT CCA MID DIAS: 15 CM/S
LEFT CCA MID SYS: 100 CM/S
LEFT CCA PROX DIAS: 12 CM/S
LEFT CCA PROX SYS: 120 CM/S
LEFT ECA DIAS: 8 CM/S
LEFT ECA SYS: 70 CM/S
LEFT ICA DIST DIAS: 37 CM/S
LEFT ICA DIST SYS: 124 CM/S
LEFT ICA MID DIAS: 30 CM/S
LEFT ICA MID SYS: 124 CM/S
LEFT ICA PROX DIAS: 14 CM/S
LEFT ICA PROX SYS: 39 CM/S
LEFT VERTEBRAL DIAS: 16 CM/S
LEFT VERTEBRAL SYS: 52 CM/S
LYMPHOCYTES # BLD AUTO: 0.8 K/UL (ref 1–4.8)
LYMPHOCYTES NFR BLD: 21 % (ref 18–48)
MAGNESIUM SERPL-MCNC: 2.1 MG/DL (ref 1.6–2.6)
MCH RBC QN AUTO: 17.9 PG (ref 27–31)
MCHC RBC AUTO-ENTMCNC: 28.3 G/DL (ref 32–36)
MCV RBC AUTO: 63 FL (ref 82–98)
MONOCYTES # BLD AUTO: 0.5 K/UL (ref 0.3–1)
MONOCYTES NFR BLD: 12.8 % (ref 4–15)
NEUTROPHILS # BLD AUTO: 2.4 K/UL (ref 1.8–7.7)
NEUTROPHILS NFR BLD: 62.4 % (ref 38–73)
NONHDLC SERPL-MCNC: 48 MG/DL
NRBC BLD-RTO: 1 /100 WBC
OHS CV CAROTID RIGHT ICA EDV HIGHEST: 24
OHS CV CAROTID ULTRASOUND LEFT ICA/CCA RATIO: 2.43
OHS CV CAROTID ULTRASOUND RIGHT ICA/CCA RATIO: 1.42
OHS CV PV CAROTID LEFT HIGHEST CCA: 120
OHS CV PV CAROTID LEFT HIGHEST ICA: 124
OHS CV PV CAROTID RIGHT HIGHEST CCA: 72
OHS CV PV CAROTID RIGHT HIGHEST ICA: 68
OHS CV US CAROTID LEFT HIGHEST EDV: 37
OVALOCYTES BLD QL SMEAR: ABNORMAL
PHOSPHATE SERPL-MCNC: 3.5 MG/DL (ref 2.7–4.5)
PLATELET # BLD AUTO: 148 K/UL (ref 150–450)
PLATELET BLD QL SMEAR: ABNORMAL
PMV BLD AUTO: ABNORMAL FL (ref 9.2–12.9)
POCT GLUCOSE: 126 MG/DL (ref 70–110)
POCT GLUCOSE: 173 MG/DL (ref 70–110)
POCT GLUCOSE: 179 MG/DL (ref 70–110)
POIKILOCYTOSIS BLD QL SMEAR: ABNORMAL
POTASSIUM SERPL-SCNC: 3.9 MMOL/L (ref 3.5–5.1)
PREALB SERPL-MCNC: 30 MG/DL (ref 20–43)
PROT SERPL-MCNC: 5.8 G/DL (ref 6–8.4)
RBC # BLD AUTO: 6.15 M/UL (ref 4–5.4)
RIGHT CBA DIAS: 11 CM/S
RIGHT CBA SYS: 34 CM/S
RIGHT CCA DIST DIAS: 14 CM/S
RIGHT CCA DIST SYS: 48 CM/S
RIGHT CCA MID DIAS: 15 CM/S
RIGHT CCA MID SYS: 71 CM/S
RIGHT CCA PROX DIAS: 12 CM/S
RIGHT CCA PROX SYS: 72 CM/S
RIGHT ECA DIAS: 9 CM/S
RIGHT ECA SYS: 120 CM/S
RIGHT ICA DIST DIAS: 20 CM/S
RIGHT ICA DIST SYS: 60 CM/S
RIGHT ICA MID DIAS: 24 CM/S
RIGHT ICA MID SYS: 68 CM/S
RIGHT ICA PROX DIAS: 15 CM/S
RIGHT ICA PROX SYS: 40 CM/S
RIGHT VERTEBRAL DIAS: 22 CM/S
RIGHT VERTEBRAL SYS: 64 CM/S
SCHISTOCYTES BLD QL SMEAR: ABNORMAL
SODIUM SERPL-SCNC: 145 MMOL/L (ref 136–145)
SPHEROCYTES BLD QL SMEAR: ABNORMAL
TARGETS BLD QL SMEAR: ABNORMAL
TRANSFERRIN SERPL-MCNC: 199 MG/DL (ref 200–375)
TRIGL SERPL-MCNC: 74 MG/DL (ref 30–150)
WBC # BLD AUTO: 3.76 K/UL (ref 3.9–12.7)

## 2021-12-03 PROCEDURE — 80048 BASIC METABOLIC PNL TOTAL CA: CPT | Performed by: PHYSICIAN ASSISTANT

## 2021-12-03 PROCEDURE — 99233 PR SUBSEQUENT HOSPITAL CARE,LEVL III: ICD-10-PCS | Mod: ,,, | Performed by: INTERNAL MEDICINE

## 2021-12-03 PROCEDURE — 20600001 HC STEP DOWN PRIVATE ROOM

## 2021-12-03 PROCEDURE — 86803 HEPATITIS C AB TEST: CPT | Performed by: PHYSICIAN ASSISTANT

## 2021-12-03 PROCEDURE — 80061 LIPID PANEL: CPT | Performed by: PHYSICIAN ASSISTANT

## 2021-12-03 PROCEDURE — 86828 HLA CLASS I&II ANTIBODY QUAL: CPT | Mod: 91 | Performed by: PHYSICIAN ASSISTANT

## 2021-12-03 PROCEDURE — 63600175 PHARM REV CODE 636 W HCPCS: Performed by: PHYSICIAN ASSISTANT

## 2021-12-03 PROCEDURE — 83735 ASSAY OF MAGNESIUM: CPT | Performed by: PHYSICIAN ASSISTANT

## 2021-12-03 PROCEDURE — 83540 ASSAY OF IRON: CPT | Performed by: PHYSICIAN ASSISTANT

## 2021-12-03 PROCEDURE — 81373 HLA I TYPING 1 LOCUS LR: CPT | Performed by: PHYSICIAN ASSISTANT

## 2021-12-03 PROCEDURE — 83735 ASSAY OF MAGNESIUM: CPT | Mod: 91 | Performed by: STUDENT IN AN ORGANIZED HEALTH CARE EDUCATION/TRAINING PROGRAM

## 2021-12-03 PROCEDURE — 80076 HEPATIC FUNCTION PANEL: CPT | Performed by: PHYSICIAN ASSISTANT

## 2021-12-03 PROCEDURE — 36415 COLL VENOUS BLD VENIPUNCTURE: CPT | Performed by: PHYSICIAN ASSISTANT

## 2021-12-03 PROCEDURE — 81373 HLA I TYPING 1 LOCUS LR: CPT | Mod: 91 | Performed by: PHYSICIAN ASSISTANT

## 2021-12-03 PROCEDURE — 86825 HLA X-MATH NON-CYTOTOXIC: CPT | Mod: 91 | Performed by: PHYSICIAN ASSISTANT

## 2021-12-03 PROCEDURE — 25000003 PHARM REV CODE 250: Performed by: STUDENT IN AN ORGANIZED HEALTH CARE EDUCATION/TRAINING PROGRAM

## 2021-12-03 PROCEDURE — 86682 HELMINTH ANTIBODY: CPT | Performed by: PHYSICIAN ASSISTANT

## 2021-12-03 PROCEDURE — 82728 ASSAY OF FERRITIN: CPT | Performed by: PHYSICIAN ASSISTANT

## 2021-12-03 PROCEDURE — 86644 CMV ANTIBODY: CPT | Performed by: PHYSICIAN ASSISTANT

## 2021-12-03 PROCEDURE — 93750 INTERROGATION VAD IN PERSON: CPT | Mod: ,,, | Performed by: INTERNAL MEDICINE

## 2021-12-03 PROCEDURE — 86665 EPSTEIN-BARR CAPSID VCA: CPT | Performed by: PHYSICIAN ASSISTANT

## 2021-12-03 PROCEDURE — 86787 VARICELLA-ZOSTER ANTIBODY: CPT | Performed by: PHYSICIAN ASSISTANT

## 2021-12-03 PROCEDURE — 86833 HLA CLASS II HIGH DEFIN QUAL: CPT | Performed by: PHYSICIAN ASSISTANT

## 2021-12-03 PROCEDURE — 84134 ASSAY OF PREALBUMIN: CPT | Performed by: PHYSICIAN ASSISTANT

## 2021-12-03 PROCEDURE — 86832 HLA CLASS I HIGH DEFIN QUAL: CPT | Performed by: PHYSICIAN ASSISTANT

## 2021-12-03 PROCEDURE — 81376 HLA II TYPING 1 LOCUS LR: CPT | Mod: 91 | Performed by: PHYSICIAN ASSISTANT

## 2021-12-03 PROCEDURE — 86825 HLA X-MATH NON-CYTOTOXIC: CPT | Performed by: PHYSICIAN ASSISTANT

## 2021-12-03 PROCEDURE — 83036 HEMOGLOBIN GLYCOSYLATED A1C: CPT | Performed by: PHYSICIAN ASSISTANT

## 2021-12-03 PROCEDURE — 85025 COMPLETE CBC W/AUTO DIFF WBC: CPT | Performed by: PHYSICIAN ASSISTANT

## 2021-12-03 PROCEDURE — 93010 EKG 12-LEAD: ICD-10-PCS | Mod: ,,, | Performed by: INTERNAL MEDICINE

## 2021-12-03 PROCEDURE — 86695 HERPES SIMPLEX TYPE 1 TEST: CPT | Performed by: PHYSICIAN ASSISTANT

## 2021-12-03 PROCEDURE — 94761 N-INVAS EAR/PLS OXIMETRY MLT: CPT

## 2021-12-03 PROCEDURE — 87389 HIV-1 AG W/HIV-1&-2 AB AG IA: CPT | Performed by: PHYSICIAN ASSISTANT

## 2021-12-03 PROCEDURE — 81376 HLA II TYPING 1 LOCUS LR: CPT | Performed by: PHYSICIAN ASSISTANT

## 2021-12-03 PROCEDURE — 81240 F2 GENE: CPT | Performed by: PHYSICIAN ASSISTANT

## 2021-12-03 PROCEDURE — 99222 1ST HOSP IP/OBS MODERATE 55: CPT | Mod: ,,, | Performed by: OBSTETRICS & GYNECOLOGY

## 2021-12-03 PROCEDURE — 86706 HEP B SURFACE ANTIBODY: CPT | Performed by: PHYSICIAN ASSISTANT

## 2021-12-03 PROCEDURE — 81001 URINALYSIS AUTO W/SCOPE: CPT | Performed by: PHYSICIAN ASSISTANT

## 2021-12-03 PROCEDURE — 99900035 HC TECH TIME PER 15 MIN (STAT)

## 2021-12-03 PROCEDURE — 94640 AIRWAY INHALATION TREATMENT: CPT

## 2021-12-03 PROCEDURE — 99233 SBSQ HOSP IP/OBS HIGH 50: CPT | Mod: ,,, | Performed by: INTERNAL MEDICINE

## 2021-12-03 PROCEDURE — 86977 RBC SERUM PRETX INCUBJ/INHIB: CPT | Mod: 59 | Performed by: PHYSICIAN ASSISTANT

## 2021-12-03 PROCEDURE — 93005 ELECTROCARDIOGRAM TRACING: CPT

## 2021-12-03 PROCEDURE — 80307 DRUG TEST PRSMV CHEM ANLYZR: CPT | Performed by: PHYSICIAN ASSISTANT

## 2021-12-03 PROCEDURE — 27000221 HC OXYGEN, UP TO 24 HOURS

## 2021-12-03 PROCEDURE — 63600175 PHARM REV CODE 636 W HCPCS: Performed by: INTERNAL MEDICINE

## 2021-12-03 PROCEDURE — 80323 ALKALOIDS NOS: CPT | Performed by: PHYSICIAN ASSISTANT

## 2021-12-03 PROCEDURE — 83880 ASSAY OF NATRIURETIC PEPTIDE: CPT | Performed by: PHYSICIAN ASSISTANT

## 2021-12-03 PROCEDURE — 99222 PR INITIAL HOSPITAL CARE,LEVL II: ICD-10-PCS | Mod: ,,, | Performed by: OBSTETRICS & GYNECOLOGY

## 2021-12-03 PROCEDURE — 82248 BILIRUBIN DIRECT: CPT | Performed by: PHYSICIAN ASSISTANT

## 2021-12-03 PROCEDURE — 36415 COLL VENOUS BLD VENIPUNCTURE: CPT | Performed by: STUDENT IN AN ORGANIZED HEALTH CARE EDUCATION/TRAINING PROGRAM

## 2021-12-03 PROCEDURE — 25000003 PHARM REV CODE 250: Performed by: PHYSICIAN ASSISTANT

## 2021-12-03 PROCEDURE — 25000003 PHARM REV CODE 250: Performed by: NURSE PRACTITIONER

## 2021-12-03 PROCEDURE — 93750 PR INTERROGATE VENT ASSIST DEV, IN PERSON, W PHYSICIAN ANALYSIS: ICD-10-PCS | Mod: ,,, | Performed by: INTERNAL MEDICINE

## 2021-12-03 PROCEDURE — 84100 ASSAY OF PHOSPHORUS: CPT | Performed by: PHYSICIAN ASSISTANT

## 2021-12-03 PROCEDURE — 86900 BLOOD TYPING SEROLOGIC ABO: CPT | Performed by: PHYSICIAN ASSISTANT

## 2021-12-03 PROCEDURE — 83615 LACTATE (LD) (LDH) ENZYME: CPT | Performed by: PHYSICIAN ASSISTANT

## 2021-12-03 PROCEDURE — 86777 TOXOPLASMA ANTIBODY: CPT | Performed by: PHYSICIAN ASSISTANT

## 2021-12-03 PROCEDURE — 86829 HLA CLASS I/II ANTIBODY QUAL: CPT | Performed by: PHYSICIAN ASSISTANT

## 2021-12-03 PROCEDURE — 86592 SYPHILIS TEST NON-TREP QUAL: CPT | Performed by: PHYSICIAN ASSISTANT

## 2021-12-03 PROCEDURE — 80048 BASIC METABOLIC PNL TOTAL CA: CPT | Mod: 91 | Performed by: STUDENT IN AN ORGANIZED HEALTH CARE EDUCATION/TRAINING PROGRAM

## 2021-12-03 PROCEDURE — 86704 HEP B CORE ANTIBODY TOTAL: CPT | Performed by: PHYSICIAN ASSISTANT

## 2021-12-03 PROCEDURE — 84466 ASSAY OF TRANSFERRIN: CPT | Performed by: PHYSICIAN ASSISTANT

## 2021-12-03 PROCEDURE — 93010 ELECTROCARDIOGRAM REPORT: CPT | Mod: ,,, | Performed by: INTERNAL MEDICINE

## 2021-12-03 PROCEDURE — 87340 HEPATITIS B SURFACE AG IA: CPT | Performed by: PHYSICIAN ASSISTANT

## 2021-12-03 PROCEDURE — 86790 VIRUS ANTIBODY NOS: CPT | Performed by: PHYSICIAN ASSISTANT

## 2021-12-03 RX ORDER — ONDANSETRON 2 MG/ML
4 INJECTION INTRAMUSCULAR; INTRAVENOUS EVERY 6 HOURS PRN
Status: DISCONTINUED | OUTPATIENT
Start: 2021-12-03 | End: 2021-12-09 | Stop reason: HOSPADM

## 2021-12-03 RX ADMIN — APIXABAN 5 MG: 5 TABLET, FILM COATED ORAL at 08:12

## 2021-12-03 RX ADMIN — PANTOPRAZOLE SODIUM 40 MG: 40 TABLET, DELAYED RELEASE ORAL at 08:12

## 2021-12-03 RX ADMIN — FLUTICASONE FUROATE AND VILANTEROL TRIFENATATE 1 PUFF: 100; 25 POWDER RESPIRATORY (INHALATION) at 08:12

## 2021-12-03 RX ADMIN — METOPROLOL SUCCINATE 100 MG: 50 TABLET, EXTENDED RELEASE ORAL at 08:12

## 2021-12-03 RX ADMIN — ALLOPURINOL 100 MG: 100 TABLET ORAL at 08:12

## 2021-12-03 RX ADMIN — ISOSORBIDE DINITRATE: 20 TABLET ORAL at 08:12

## 2021-12-03 RX ADMIN — SACUBITRIL AND VALSARTAN 1 TABLET: 24; 26 TABLET, FILM COATED ORAL at 08:12

## 2021-12-03 RX ADMIN — Medication 400 MG: at 08:12

## 2021-12-03 RX ADMIN — SERTRALINE HYDROCHLORIDE 25 MG: 25 TABLET ORAL at 08:12

## 2021-12-03 RX ADMIN — TIOTROPIUM BROMIDE INHALATION SPRAY 2 PUFF: 3.12 SPRAY, METERED RESPIRATORY (INHALATION) at 08:12

## 2021-12-03 RX ADMIN — ONDANSETRON 4 MG: 2 INJECTION INTRAMUSCULAR; INTRAVENOUS at 08:12

## 2021-12-03 RX ADMIN — MONTELUKAST 10 MG: 10 TABLET, FILM COATED ORAL at 08:12

## 2021-12-03 RX ADMIN — ONDANSETRON 4 MG: 2 INJECTION INTRAMUSCULAR; INTRAVENOUS at 04:12

## 2021-12-03 RX ADMIN — ATORVASTATIN CALCIUM 40 MG: 20 TABLET, FILM COATED ORAL at 08:12

## 2021-12-03 RX ADMIN — FUROSEMIDE 20 MG/HR: 10 INJECTION, SOLUTION INTRAMUSCULAR; INTRAVENOUS at 10:12

## 2021-12-04 LAB
ANION GAP SERPL CALC-SCNC: 12 MMOL/L (ref 8–16)
ANION GAP SERPL CALC-SCNC: 8 MMOL/L (ref 8–16)
ANION GAP SERPL CALC-SCNC: 9 MMOL/L (ref 8–16)
APTT BLDCRRT: 26.3 SEC (ref 21–32)
BACTERIA #/AREA URNS AUTO: NORMAL /HPF
BASOPHILS # BLD AUTO: 0.02 K/UL (ref 0–0.2)
BASOPHILS NFR BLD: 0.7 % (ref 0–1.9)
BILIRUB UR QL STRIP: NEGATIVE
BUN SERPL-MCNC: 39 MG/DL (ref 6–20)
BUN SERPL-MCNC: 43 MG/DL (ref 6–20)
BUN SERPL-MCNC: 43 MG/DL (ref 6–20)
CALCIUM SERPL-MCNC: 9 MG/DL (ref 8.7–10.5)
CALCIUM SERPL-MCNC: 9.1 MG/DL (ref 8.7–10.5)
CALCIUM SERPL-MCNC: 9.2 MG/DL (ref 8.7–10.5)
CHLORIDE SERPL-SCNC: 100 MMOL/L (ref 95–110)
CHLORIDE SERPL-SCNC: 99 MMOL/L (ref 95–110)
CHLORIDE SERPL-SCNC: 99 MMOL/L (ref 95–110)
CLARITY UR REFRACT.AUTO: CLEAR
CLOSURE TME COLL+ADP BLD: 142 SECS (ref 59–120)
CO2 SERPL-SCNC: 30 MMOL/L (ref 23–29)
CO2 SERPL-SCNC: 33 MMOL/L (ref 23–29)
CO2 SERPL-SCNC: 36 MMOL/L (ref 23–29)
COLOR UR AUTO: YELLOW
CREAT SERPL-MCNC: 1.7 MG/DL (ref 0.5–1.4)
CRP SERPL-MCNC: 1.4 MG/L (ref 0–8.2)
DIFFERENTIAL METHOD: ABNORMAL
EOSINOPHIL # BLD AUTO: 0.1 K/UL (ref 0–0.5)
EOSINOPHIL NFR BLD: 3.3 % (ref 0–8)
ERYTHROCYTE [DISTWIDTH] IN BLOOD BY AUTOMATED COUNT: 29.1 % (ref 11.5–14.5)
EST. GFR  (AFRICAN AMERICAN): 38.3 ML/MIN/1.73 M^2
EST. GFR  (NON AFRICAN AMERICAN): 33.2 ML/MIN/1.73 M^2
FIBRINOGEN PPP-MCNC: 339 MG/DL (ref 182–400)
GLUCOSE SERPL-MCNC: 131 MG/DL (ref 70–110)
GLUCOSE SERPL-MCNC: 139 MG/DL (ref 70–110)
GLUCOSE SERPL-MCNC: 154 MG/DL (ref 70–110)
GLUCOSE UR QL STRIP: NEGATIVE
HCT VFR BLD AUTO: 37 % (ref 37–48.5)
HCYS SERPL-SCNC: 12.7 UMOL/L (ref 4–15.5)
HGB BLD-MCNC: 10.3 G/DL (ref 12–16)
HGB UR QL STRIP: ABNORMAL
HYALINE CASTS UR QL AUTO: 1 /LPF
IMM GRANULOCYTES # BLD AUTO: 0.01 K/UL (ref 0–0.04)
IMM GRANULOCYTES NFR BLD AUTO: 0.3 % (ref 0–0.5)
INR PPP: 1 (ref 0.8–1.2)
KETONES UR QL STRIP: NEGATIVE
LEUKOCYTE ESTERASE UR QL STRIP: NEGATIVE
LYMPHOCYTES # BLD AUTO: 0.7 K/UL (ref 1–4.8)
LYMPHOCYTES NFR BLD: 23.9 % (ref 18–48)
MAGNESIUM SERPL-MCNC: 2 MG/DL (ref 1.6–2.6)
MAGNESIUM SERPL-MCNC: 2.1 MG/DL (ref 1.6–2.6)
MAGNESIUM SERPL-MCNC: 2.1 MG/DL (ref 1.6–2.6)
MCH RBC QN AUTO: 17.7 PG (ref 27–31)
MCHC RBC AUTO-ENTMCNC: 27.8 G/DL (ref 32–36)
MCV RBC AUTO: 64 FL (ref 82–98)
MICROSCOPIC COMMENT: NORMAL
MONOCYTES # BLD AUTO: 0.4 K/UL (ref 0.3–1)
MONOCYTES NFR BLD: 13.8 % (ref 4–15)
NEUTROPHILS # BLD AUTO: 1.8 K/UL (ref 1.8–7.7)
NEUTROPHILS NFR BLD: 58 % (ref 38–73)
NITRITE UR QL STRIP: NEGATIVE
NRBC BLD-RTO: 0 /100 WBC
PH UR STRIP: 6 [PH] (ref 5–8)
PLATELET # BLD AUTO: 142 K/UL (ref 150–450)
PLATELET FUNCTION ASSAY - EPINEPHRINE: >300 SECS (ref 76–199)
PMV BLD AUTO: ABNORMAL FL (ref 9.2–12.9)
POCT GLUCOSE: 146 MG/DL (ref 70–110)
POCT GLUCOSE: 155 MG/DL (ref 70–110)
POTASSIUM SERPL-SCNC: 3.6 MMOL/L (ref 3.5–5.1)
POTASSIUM SERPL-SCNC: 3.7 MMOL/L (ref 3.5–5.1)
POTASSIUM SERPL-SCNC: 3.9 MMOL/L (ref 3.5–5.1)
PROT UR QL STRIP: ABNORMAL
PROTHROMBIN TIME: 10.7 SEC (ref 9–12.5)
RBC # BLD AUTO: 5.82 M/UL (ref 4–5.4)
RBC #/AREA URNS AUTO: 1 /HPF (ref 0–4)
RPR SER QL: NORMAL
SODIUM SERPL-SCNC: 141 MMOL/L (ref 136–145)
SODIUM SERPL-SCNC: 141 MMOL/L (ref 136–145)
SODIUM SERPL-SCNC: 144 MMOL/L (ref 136–145)
SP GR UR STRIP: 1.01 (ref 1–1.03)
SQUAMOUS #/AREA URNS AUTO: 3 /HPF
TSH SERPL DL<=0.005 MIU/L-ACNC: 1.56 UIU/ML (ref 0.4–4)
URN SPEC COLLECT METH UR: ABNORMAL
WBC # BLD AUTO: 3.05 K/UL (ref 3.9–12.7)
WBC #/AREA URNS AUTO: 2 /HPF (ref 0–5)

## 2021-12-04 PROCEDURE — 94640 AIRWAY INHALATION TREATMENT: CPT

## 2021-12-04 PROCEDURE — 84443 ASSAY THYROID STIM HORMONE: CPT | Performed by: PHYSICIAN ASSISTANT

## 2021-12-04 PROCEDURE — 85301 ANTITHROMBIN III ANTIGEN: CPT | Performed by: PHYSICIAN ASSISTANT

## 2021-12-04 PROCEDURE — 36415 COLL VENOUS BLD VENIPUNCTURE: CPT | Performed by: INTERNAL MEDICINE

## 2021-12-04 PROCEDURE — 99233 SBSQ HOSP IP/OBS HIGH 50: CPT | Mod: ,,, | Performed by: THORACIC SURGERY (CARDIOTHORACIC VASCULAR SURGERY)

## 2021-12-04 PROCEDURE — 25000003 PHARM REV CODE 250: Performed by: NURSE PRACTITIONER

## 2021-12-04 PROCEDURE — 83735 ASSAY OF MAGNESIUM: CPT | Performed by: PHYSICIAN ASSISTANT

## 2021-12-04 PROCEDURE — 85610 PROTHROMBIN TIME: CPT | Performed by: PHYSICIAN ASSISTANT

## 2021-12-04 PROCEDURE — 85576 BLOOD PLATELET AGGREGATION: CPT | Performed by: PHYSICIAN ASSISTANT

## 2021-12-04 PROCEDURE — 83090 ASSAY OF HOMOCYSTEINE: CPT | Performed by: PHYSICIAN ASSISTANT

## 2021-12-04 PROCEDURE — 20600001 HC STEP DOWN PRIVATE ROOM

## 2021-12-04 PROCEDURE — 85730 THROMBOPLASTIN TIME PARTIAL: CPT | Performed by: PHYSICIAN ASSISTANT

## 2021-12-04 PROCEDURE — 63600175 PHARM REV CODE 636 W HCPCS: Performed by: PHYSICIAN ASSISTANT

## 2021-12-04 PROCEDURE — 25000003 PHARM REV CODE 250: Performed by: INTERNAL MEDICINE

## 2021-12-04 PROCEDURE — 25000003 PHARM REV CODE 250: Performed by: PHYSICIAN ASSISTANT

## 2021-12-04 PROCEDURE — 94761 N-INVAS EAR/PLS OXIMETRY MLT: CPT

## 2021-12-04 PROCEDURE — 85384 FIBRINOGEN ACTIVITY: CPT | Performed by: PHYSICIAN ASSISTANT

## 2021-12-04 PROCEDURE — 85397 CLOTTING FUNCT ACTIVITY: CPT | Performed by: PHYSICIAN ASSISTANT

## 2021-12-04 PROCEDURE — 85246 CLOT FACTOR VIII VW ANTIGEN: CPT | Performed by: PHYSICIAN ASSISTANT

## 2021-12-04 PROCEDURE — 99900035 HC TECH TIME PER 15 MIN (STAT)

## 2021-12-04 PROCEDURE — 85240 CLOT FACTOR VIII AHG 1 STAGE: CPT | Performed by: PHYSICIAN ASSISTANT

## 2021-12-04 PROCEDURE — 80048 BASIC METABOLIC PNL TOTAL CA: CPT | Mod: 91 | Performed by: INTERNAL MEDICINE

## 2021-12-04 PROCEDURE — 85247 CLOT FACTOR VIII MULTIMETRIC: CPT | Performed by: PHYSICIAN ASSISTANT

## 2021-12-04 PROCEDURE — 99233 PR SUBSEQUENT HOSPITAL CARE,LEVL III: ICD-10-PCS | Mod: ,,, | Performed by: INTERNAL MEDICINE

## 2021-12-04 PROCEDURE — 86147 CARDIOLIPIN ANTIBODY EA IG: CPT | Mod: 59 | Performed by: PHYSICIAN ASSISTANT

## 2021-12-04 PROCEDURE — 85576 BLOOD PLATELET AGGREGATION: CPT | Mod: 91 | Performed by: PHYSICIAN ASSISTANT

## 2021-12-04 PROCEDURE — 83735 ASSAY OF MAGNESIUM: CPT | Mod: 91 | Performed by: INTERNAL MEDICINE

## 2021-12-04 PROCEDURE — 36415 COLL VENOUS BLD VENIPUNCTURE: CPT | Performed by: PHYSICIAN ASSISTANT

## 2021-12-04 PROCEDURE — 27000221 HC OXYGEN, UP TO 24 HOURS

## 2021-12-04 PROCEDURE — 86140 C-REACTIVE PROTEIN: CPT | Performed by: PHYSICIAN ASSISTANT

## 2021-12-04 PROCEDURE — 25000003 PHARM REV CODE 250: Performed by: STUDENT IN AN ORGANIZED HEALTH CARE EDUCATION/TRAINING PROGRAM

## 2021-12-04 PROCEDURE — 82955 ASSAY OF G6PD ENZYME: CPT | Performed by: PHYSICIAN ASSISTANT

## 2021-12-04 PROCEDURE — 85025 COMPLETE CBC W/AUTO DIFF WBC: CPT | Performed by: PHYSICIAN ASSISTANT

## 2021-12-04 PROCEDURE — 99233 PR SUBSEQUENT HOSPITAL CARE,LEVL III: ICD-10-PCS | Mod: ,,, | Performed by: THORACIC SURGERY (CARDIOTHORACIC VASCULAR SURGERY)

## 2021-12-04 PROCEDURE — 80048 BASIC METABOLIC PNL TOTAL CA: CPT | Performed by: PHYSICIAN ASSISTANT

## 2021-12-04 PROCEDURE — 99233 SBSQ HOSP IP/OBS HIGH 50: CPT | Mod: ,,, | Performed by: INTERNAL MEDICINE

## 2021-12-04 RX ORDER — TALC
6 POWDER (GRAM) TOPICAL NIGHTLY PRN
Status: DISCONTINUED | OUTPATIENT
Start: 2021-12-04 | End: 2021-12-09 | Stop reason: HOSPADM

## 2021-12-04 RX ORDER — POTASSIUM CHLORIDE 20 MEQ/1
40 TABLET, EXTENDED RELEASE ORAL ONCE
Status: COMPLETED | OUTPATIENT
Start: 2021-12-04 | End: 2021-12-04

## 2021-12-04 RX ORDER — SPIRONOLACTONE 25 MG/1
25 TABLET ORAL DAILY
Status: DISCONTINUED | OUTPATIENT
Start: 2021-12-04 | End: 2021-12-09 | Stop reason: HOSPADM

## 2021-12-04 RX ORDER — POTASSIUM CHLORIDE 20 MEQ/1
40 TABLET, EXTENDED RELEASE ORAL
Status: COMPLETED | OUTPATIENT
Start: 2021-12-04 | End: 2021-12-05

## 2021-12-04 RX ORDER — CAMPHOR
SPIRIT TOPICAL 4 TIMES DAILY PRN
Status: DISCONTINUED | OUTPATIENT
Start: 2021-12-04 | End: 2021-12-09 | Stop reason: HOSPADM

## 2021-12-04 RX ADMIN — POTASSIUM CHLORIDE 40 MEQ: 1500 TABLET, EXTENDED RELEASE ORAL at 12:12

## 2021-12-04 RX ADMIN — METOPROLOL SUCCINATE 100 MG: 50 TABLET, EXTENDED RELEASE ORAL at 09:12

## 2021-12-04 RX ADMIN — ATORVASTATIN CALCIUM 40 MG: 20 TABLET, FILM COATED ORAL at 08:12

## 2021-12-04 RX ADMIN — FUROSEMIDE 20 MG/HR: 10 INJECTION, SOLUTION INTRAMUSCULAR; INTRAVENOUS at 09:12

## 2021-12-04 RX ADMIN — APIXABAN 5 MG: 5 TABLET, FILM COATED ORAL at 09:12

## 2021-12-04 RX ADMIN — SPIRONOLACTONE 25 MG: 25 TABLET ORAL at 09:12

## 2021-12-04 RX ADMIN — Medication 400 MG: at 09:12

## 2021-12-04 RX ADMIN — POTASSIUM CHLORIDE 40 MEQ: 1500 TABLET, EXTENDED RELEASE ORAL at 07:12

## 2021-12-04 RX ADMIN — TIOTROPIUM BROMIDE INHALATION SPRAY 2 PUFF: 3.12 SPRAY, METERED RESPIRATORY (INHALATION) at 08:12

## 2021-12-04 RX ADMIN — ACETAMINOPHEN 650 MG: 325 TABLET ORAL at 12:12

## 2021-12-04 RX ADMIN — SACUBITRIL AND VALSARTAN 1 TABLET: 24; 26 TABLET, FILM COATED ORAL at 09:12

## 2021-12-04 RX ADMIN — POTASSIUM CHLORIDE 40 MEQ: 1500 TABLET, EXTENDED RELEASE ORAL at 09:12

## 2021-12-04 RX ADMIN — MONTELUKAST 10 MG: 10 TABLET, FILM COATED ORAL at 08:12

## 2021-12-04 RX ADMIN — ISOSORBIDE DINITRATE: 20 TABLET ORAL at 09:12

## 2021-12-04 RX ADMIN — SERTRALINE HYDROCHLORIDE 25 MG: 25 TABLET ORAL at 08:12

## 2021-12-04 RX ADMIN — FLUTICASONE FUROATE AND VILANTEROL TRIFENATATE 1 PUFF: 100; 25 POWDER RESPIRATORY (INHALATION) at 08:12

## 2021-12-04 RX ADMIN — Medication 6 MG: at 09:12

## 2021-12-04 RX ADMIN — APIXABAN 5 MG: 5 TABLET, FILM COATED ORAL at 08:12

## 2021-12-04 RX ADMIN — PANTOPRAZOLE SODIUM 40 MG: 40 TABLET, DELAYED RELEASE ORAL at 08:12

## 2021-12-04 RX ADMIN — ACETAMINOPHEN 650 MG: 325 TABLET ORAL at 09:12

## 2021-12-04 RX ADMIN — METOPROLOL SUCCINATE 100 MG: 50 TABLET, EXTENDED RELEASE ORAL at 08:12

## 2021-12-04 RX ADMIN — Medication 400 MG: at 08:12

## 2021-12-05 LAB
ANION GAP SERPL CALC-SCNC: 10 MMOL/L (ref 8–16)
ANION GAP SERPL CALC-SCNC: 10 MMOL/L (ref 8–16)
ANISOCYTOSIS BLD QL SMEAR: ABNORMAL
BASOPHILS # BLD AUTO: 0.03 K/UL (ref 0–0.2)
BASOPHILS NFR BLD: 0.9 % (ref 0–1.9)
BUN SERPL-MCNC: 44 MG/DL (ref 6–20)
BUN SERPL-MCNC: 45 MG/DL (ref 6–20)
CALCIUM SERPL-MCNC: 9.3 MG/DL (ref 8.7–10.5)
CALCIUM SERPL-MCNC: 9.3 MG/DL (ref 8.7–10.5)
CHLORIDE SERPL-SCNC: 100 MMOL/L (ref 95–110)
CHLORIDE SERPL-SCNC: 101 MMOL/L (ref 95–110)
CO2 SERPL-SCNC: 31 MMOL/L (ref 23–29)
CO2 SERPL-SCNC: 33 MMOL/L (ref 23–29)
CREAT SERPL-MCNC: 1.8 MG/DL (ref 0.5–1.4)
CREAT SERPL-MCNC: 1.9 MG/DL (ref 0.5–1.4)
DIFFERENTIAL METHOD: ABNORMAL
EOSINOPHIL # BLD AUTO: 0.1 K/UL (ref 0–0.5)
EOSINOPHIL NFR BLD: 4.3 % (ref 0–8)
ERYTHROCYTE [DISTWIDTH] IN BLOOD BY AUTOMATED COUNT: 28.4 % (ref 11.5–14.5)
EST. GFR  (AFRICAN AMERICAN): 33.5 ML/MIN/1.73 M^2
EST. GFR  (AFRICAN AMERICAN): 35.8 ML/MIN/1.73 M^2
EST. GFR  (NON AFRICAN AMERICAN): 29.1 ML/MIN/1.73 M^2
EST. GFR  (NON AFRICAN AMERICAN): 31 ML/MIN/1.73 M^2
GLUCOSE SERPL-MCNC: 104 MG/DL (ref 70–110)
GLUCOSE SERPL-MCNC: 114 MG/DL (ref 70–110)
HCT VFR BLD AUTO: 35.6 % (ref 37–48.5)
HGB BLD-MCNC: 10.1 G/DL (ref 12–16)
HYPOCHROMIA BLD QL SMEAR: ABNORMAL
IMM GRANULOCYTES # BLD AUTO: 0.01 K/UL (ref 0–0.04)
IMM GRANULOCYTES NFR BLD AUTO: 0.3 % (ref 0–0.5)
LYMPHOCYTES # BLD AUTO: 0.9 K/UL (ref 1–4.8)
LYMPHOCYTES NFR BLD: 27.1 % (ref 18–48)
MAGNESIUM SERPL-MCNC: 2.2 MG/DL (ref 1.6–2.6)
MAGNESIUM SERPL-MCNC: 2.2 MG/DL (ref 1.6–2.6)
MCH RBC QN AUTO: 17.4 PG (ref 27–31)
MCHC RBC AUTO-ENTMCNC: 28.4 G/DL (ref 32–36)
MCV RBC AUTO: 61 FL (ref 82–98)
MONOCYTES # BLD AUTO: 0.5 K/UL (ref 0.3–1)
MONOCYTES NFR BLD: 15.9 % (ref 4–15)
NEUTROPHILS # BLD AUTO: 1.7 K/UL (ref 1.8–7.7)
NEUTROPHILS NFR BLD: 51.5 % (ref 38–73)
NRBC BLD-RTO: 0 /100 WBC
OVALOCYTES BLD QL SMEAR: ABNORMAL
PLATELET # BLD AUTO: 127 K/UL (ref 150–450)
PLATELET BLD QL SMEAR: ABNORMAL
PMV BLD AUTO: ABNORMAL FL (ref 9.2–12.9)
POCT GLUCOSE: 145 MG/DL (ref 70–110)
POCT GLUCOSE: 147 MG/DL (ref 70–110)
POCT GLUCOSE: 175 MG/DL (ref 70–110)
POCT GLUCOSE: 186 MG/DL (ref 70–110)
POIKILOCYTOSIS BLD QL SMEAR: ABNORMAL
POTASSIUM SERPL-SCNC: 4.7 MMOL/L (ref 3.5–5.1)
POTASSIUM SERPL-SCNC: 5 MMOL/L (ref 3.5–5.1)
RBC # BLD AUTO: 5.8 M/UL (ref 4–5.4)
SCHISTOCYTES BLD QL SMEAR: ABNORMAL
SODIUM SERPL-SCNC: 141 MMOL/L (ref 136–145)
SODIUM SERPL-SCNC: 144 MMOL/L (ref 136–145)
SPHEROCYTES BLD QL SMEAR: ABNORMAL
TARGETS BLD QL SMEAR: ABNORMAL
WBC # BLD AUTO: 3.28 K/UL (ref 3.9–12.7)

## 2021-12-05 PROCEDURE — 94640 AIRWAY INHALATION TREATMENT: CPT

## 2021-12-05 PROCEDURE — 80048 BASIC METABOLIC PNL TOTAL CA: CPT | Performed by: INTERNAL MEDICINE

## 2021-12-05 PROCEDURE — 20600001 HC STEP DOWN PRIVATE ROOM

## 2021-12-05 PROCEDURE — 63600175 PHARM REV CODE 636 W HCPCS: Performed by: STUDENT IN AN ORGANIZED HEALTH CARE EDUCATION/TRAINING PROGRAM

## 2021-12-05 PROCEDURE — 36415 COLL VENOUS BLD VENIPUNCTURE: CPT | Performed by: INTERNAL MEDICINE

## 2021-12-05 PROCEDURE — 25000003 PHARM REV CODE 250: Performed by: INTERNAL MEDICINE

## 2021-12-05 PROCEDURE — 27000221 HC OXYGEN, UP TO 24 HOURS

## 2021-12-05 PROCEDURE — 85025 COMPLETE CBC W/AUTO DIFF WBC: CPT | Performed by: PHYSICIAN ASSISTANT

## 2021-12-05 PROCEDURE — 63600175 PHARM REV CODE 636 W HCPCS: Performed by: PHYSICIAN ASSISTANT

## 2021-12-05 PROCEDURE — 25000003 PHARM REV CODE 250: Performed by: STUDENT IN AN ORGANIZED HEALTH CARE EDUCATION/TRAINING PROGRAM

## 2021-12-05 PROCEDURE — 83735 ASSAY OF MAGNESIUM: CPT | Performed by: INTERNAL MEDICINE

## 2021-12-05 PROCEDURE — 93010 ELECTROCARDIOGRAM REPORT: CPT | Mod: ,,, | Performed by: INTERNAL MEDICINE

## 2021-12-05 PROCEDURE — 99900035 HC TECH TIME PER 15 MIN (STAT)

## 2021-12-05 PROCEDURE — 25000003 PHARM REV CODE 250: Performed by: PHYSICIAN ASSISTANT

## 2021-12-05 PROCEDURE — 99233 PR SUBSEQUENT HOSPITAL CARE,LEVL III: ICD-10-PCS | Mod: ,,, | Performed by: INTERNAL MEDICINE

## 2021-12-05 PROCEDURE — 93005 ELECTROCARDIOGRAM TRACING: CPT

## 2021-12-05 PROCEDURE — 36415 COLL VENOUS BLD VENIPUNCTURE: CPT | Performed by: PHYSICIAN ASSISTANT

## 2021-12-05 PROCEDURE — 94760 N-INVAS EAR/PLS OXIMETRY 1: CPT

## 2021-12-05 PROCEDURE — 25000003 PHARM REV CODE 250: Performed by: NURSE PRACTITIONER

## 2021-12-05 PROCEDURE — 99233 SBSQ HOSP IP/OBS HIGH 50: CPT | Mod: ,,, | Performed by: INTERNAL MEDICINE

## 2021-12-05 PROCEDURE — 93010 EKG 12-LEAD: ICD-10-PCS | Mod: ,,, | Performed by: INTERNAL MEDICINE

## 2021-12-05 RX ORDER — SODIUM CHLORIDE 9 MG/ML
INJECTION, SOLUTION INTRAVENOUS
Status: DISCONTINUED | OUTPATIENT
Start: 2021-12-05 | End: 2021-12-09 | Stop reason: HOSPADM

## 2021-12-05 RX ORDER — LANOLIN ALCOHOL/MO/W.PET/CERES
400 CREAM (GRAM) TOPICAL 2 TIMES DAILY
Status: DISCONTINUED | OUTPATIENT
Start: 2021-12-06 | End: 2021-12-09 | Stop reason: HOSPADM

## 2021-12-05 RX ORDER — MAGNESIUM SULFATE 1 G/100ML
1 INJECTION INTRAVENOUS ONCE
Status: COMPLETED | OUTPATIENT
Start: 2021-12-05 | End: 2021-12-05

## 2021-12-05 RX ADMIN — SERTRALINE HYDROCHLORIDE 25 MG: 25 TABLET ORAL at 08:12

## 2021-12-05 RX ADMIN — FLUTICASONE FUROATE AND VILANTEROL TRIFENATATE 1 PUFF: 100; 25 POWDER RESPIRATORY (INHALATION) at 08:12

## 2021-12-05 RX ADMIN — ISOSORBIDE DINITRATE: 20 TABLET ORAL at 10:12

## 2021-12-05 RX ADMIN — MONTELUKAST 10 MG: 10 TABLET, FILM COATED ORAL at 08:12

## 2021-12-05 RX ADMIN — ISOSORBIDE DINITRATE: 20 TABLET ORAL at 08:12

## 2021-12-05 RX ADMIN — POTASSIUM CHLORIDE 40 MEQ: 1500 TABLET, EXTENDED RELEASE ORAL at 01:12

## 2021-12-05 RX ADMIN — FUROSEMIDE 20 MG/HR: 10 INJECTION, SOLUTION INTRAMUSCULAR; INTRAVENOUS at 10:12

## 2021-12-05 RX ADMIN — METOPROLOL SUCCINATE 100 MG: 50 TABLET, EXTENDED RELEASE ORAL at 09:12

## 2021-12-05 RX ADMIN — APIXABAN 5 MG: 5 TABLET, FILM COATED ORAL at 08:12

## 2021-12-05 RX ADMIN — SACUBITRIL AND VALSARTAN 1 TABLET: 24; 26 TABLET, FILM COATED ORAL at 08:12

## 2021-12-05 RX ADMIN — Medication 6 MG: at 09:12

## 2021-12-05 RX ADMIN — SACUBITRIL AND VALSARTAN 1 TABLET: 24; 26 TABLET, FILM COATED ORAL at 09:12

## 2021-12-05 RX ADMIN — ATORVASTATIN CALCIUM 40 MG: 20 TABLET, FILM COATED ORAL at 08:12

## 2021-12-05 RX ADMIN — SODIUM CHLORIDE: 0.9 INJECTION, SOLUTION INTRAVENOUS at 09:12

## 2021-12-05 RX ADMIN — TIOTROPIUM BROMIDE INHALATION SPRAY 2 PUFF: 3.12 SPRAY, METERED RESPIRATORY (INHALATION) at 08:12

## 2021-12-05 RX ADMIN — METOPROLOL SUCCINATE 100 MG: 50 TABLET, EXTENDED RELEASE ORAL at 08:12

## 2021-12-05 RX ADMIN — Medication 400 MG: at 08:12

## 2021-12-05 RX ADMIN — SPIRONOLACTONE 25 MG: 25 TABLET ORAL at 08:12

## 2021-12-05 RX ADMIN — MAGNESIUM SULFATE HEPTAHYDRATE 1 G: 500 INJECTION, SOLUTION INTRAMUSCULAR; INTRAVENOUS at 09:12

## 2021-12-05 RX ADMIN — APIXABAN 5 MG: 5 TABLET, FILM COATED ORAL at 09:12

## 2021-12-05 RX ADMIN — PANTOPRAZOLE SODIUM 40 MG: 40 TABLET, DELAYED RELEASE ORAL at 08:12

## 2021-12-06 LAB
ACANTHOCYTES BLD QL SMEAR: PRESENT
ALBUMIN SERPL BCP-MCNC: 3.3 G/DL (ref 3.5–5.2)
ALP SERPL-CCNC: 41 U/L (ref 55–135)
ALT SERPL W/O P-5'-P-CCNC: 30 U/L (ref 10–44)
AMPHETAMINES SERPL QL: NEGATIVE
ANION GAP SERPL CALC-SCNC: 12 MMOL/L (ref 8–16)
ANION GAP SERPL CALC-SCNC: 14 MMOL/L (ref 8–16)
ANISOCYTOSIS BLD QL SMEAR: ABNORMAL
AST SERPL-CCNC: 28 U/L (ref 10–40)
AT III AG ACT/NOR PPP IA: 94 % (ref 80–120)
B-HCG UR QL: NEGATIVE
BARBITURATES SERPL QL SCN: NEGATIVE
BASOPHILS # BLD AUTO: 0.02 K/UL (ref 0–0.2)
BASOPHILS NFR BLD: 0.6 % (ref 0–1.9)
BENZODIAZ SERPL QL SCN: NEGATIVE
BILIRUB DIRECT SERPL-MCNC: 0.7 MG/DL (ref 0.1–0.3)
BILIRUB SERPL-MCNC: 2.5 MG/DL (ref 0.1–1)
BNP SERPL-MCNC: 2492 PG/ML (ref 0–99)
BUN SERPL-MCNC: 43 MG/DL (ref 6–20)
BUN SERPL-MCNC: 47 MG/DL (ref 6–20)
BZE SERPL QL: NEGATIVE
CALCIUM SERPL-MCNC: 9.3 MG/DL (ref 8.7–10.5)
CALCIUM SERPL-MCNC: 9.4 MG/DL (ref 8.7–10.5)
CARBOXYTHC SERPL QL SCN: NEGATIVE
CARDIOLIPIN IGG SER IA-ACNC: <9.4 GPL (ref 0–14.99)
CARDIOLIPIN IGM SER IA-ACNC: 10.3 MPL (ref 0–12.49)
CHLORIDE SERPL-SCNC: 99 MMOL/L (ref 95–110)
CHLORIDE SERPL-SCNC: 99 MMOL/L (ref 95–110)
CO2 SERPL-SCNC: 29 MMOL/L (ref 23–29)
CO2 SERPL-SCNC: 34 MMOL/L (ref 23–29)
COTININE SERPL-MCNC: <3 NG/ML
CREAT SERPL-MCNC: 1.7 MG/DL (ref 0.5–1.4)
CREAT SERPL-MCNC: 1.9 MG/DL (ref 0.5–1.4)
DACRYOCYTES BLD QL SMEAR: ABNORMAL
DIFFERENTIAL METHOD: ABNORMAL
EOSINOPHIL # BLD AUTO: 0.1 K/UL (ref 0–0.5)
EOSINOPHIL NFR BLD: 4.1 % (ref 0–8)
ERYTHROCYTE [DISTWIDTH] IN BLOOD BY AUTOMATED COUNT: 27.9 % (ref 11.5–14.5)
EST. GFR  (AFRICAN AMERICAN): 33.5 ML/MIN/1.73 M^2
EST. GFR  (AFRICAN AMERICAN): 38.3 ML/MIN/1.73 M^2
EST. GFR  (NON AFRICAN AMERICAN): 29.1 ML/MIN/1.73 M^2
EST. GFR  (NON AFRICAN AMERICAN): 33.2 ML/MIN/1.73 M^2
ETHANOL SERPL QL SCN: NEGATIVE
FACT VIII ACT/NOR PPP: 167 % (ref 60–170)
G6PD RBC-CCNT: 26.4 U/G HB (ref 8–11.9)
GLUCOSE SERPL-MCNC: 104 MG/DL (ref 70–110)
GLUCOSE SERPL-MCNC: 131 MG/DL (ref 70–110)
HCT VFR BLD AUTO: 36.4 % (ref 37–48.5)
HGB BLD-MCNC: 10.3 G/DL (ref 12–16)
HYPOCHROMIA BLD QL SMEAR: ABNORMAL
IMM GRANULOCYTES # BLD AUTO: 0.01 K/UL (ref 0–0.04)
IMM GRANULOCYTES NFR BLD AUTO: 0.3 % (ref 0–0.5)
LYMPHOCYTES # BLD AUTO: 1 K/UL (ref 1–4.8)
LYMPHOCYTES NFR BLD: 29.3 % (ref 18–48)
MAGNESIUM SERPL-MCNC: 2.4 MG/DL (ref 1.6–2.6)
MAGNESIUM SERPL-MCNC: 2.4 MG/DL (ref 1.6–2.6)
MCH RBC QN AUTO: 17.4 PG (ref 27–31)
MCHC RBC AUTO-ENTMCNC: 28.3 G/DL (ref 32–36)
MCV RBC AUTO: 62 FL (ref 82–98)
METHADONE SERPL QL SCN: NEGATIVE
MONOCYTES # BLD AUTO: 0.6 K/UL (ref 0.3–1)
MONOCYTES NFR BLD: 16.3 % (ref 4–15)
NEUTROPHILS # BLD AUTO: 1.7 K/UL (ref 1.8–7.7)
NEUTROPHILS NFR BLD: 49.4 % (ref 38–73)
NICOTINE SERPL-MCNC: <3 NG/ML
NRBC BLD-RTO: 0 /100 WBC
OPIATES SERPL QL SCN: NEGATIVE
OVALOCYTES BLD QL SMEAR: ABNORMAL
PCP SERPL QL SCN: NEGATIVE
PLATELET # BLD AUTO: 132 K/UL (ref 150–450)
PLATELET BLD QL SMEAR: ABNORMAL
PMV BLD AUTO: ABNORMAL FL (ref 9.2–12.9)
POCT GLUCOSE: 135 MG/DL (ref 70–110)
POCT GLUCOSE: 149 MG/DL (ref 70–110)
POCT GLUCOSE: 151 MG/DL (ref 70–110)
POCT GLUCOSE: 188 MG/DL (ref 70–110)
POIKILOCYTOSIS BLD QL SMEAR: ABNORMAL
POTASSIUM SERPL-SCNC: 4.3 MMOL/L (ref 3.5–5.1)
POTASSIUM SERPL-SCNC: 4.6 MMOL/L (ref 3.5–5.1)
PROPOXYPH SERPL QL: NEGATIVE
PROT SERPL-MCNC: 5.5 G/DL (ref 6–8.4)
RBC # BLD AUTO: 5.91 M/UL (ref 4–5.4)
SCHISTOCYTES BLD QL SMEAR: ABNORMAL
SODIUM SERPL-SCNC: 142 MMOL/L (ref 136–145)
SODIUM SERPL-SCNC: 145 MMOL/L (ref 136–145)
SPHEROCYTES BLD QL SMEAR: ABNORMAL
TARGETS BLD QL SMEAR: ABNORMAL
VWF AG ACT/NOR PPP IA: 141 % (ref 55–200)
VWF:AC ACT/NOR PPP IA: 96 % (ref 55–200)
WBC # BLD AUTO: 3.38 K/UL (ref 3.9–12.7)

## 2021-12-06 PROCEDURE — 25000003 PHARM REV CODE 250: Performed by: STUDENT IN AN ORGANIZED HEALTH CARE EDUCATION/TRAINING PROGRAM

## 2021-12-06 PROCEDURE — 99233 SBSQ HOSP IP/OBS HIGH 50: CPT | Mod: ,,, | Performed by: INTERNAL MEDICINE

## 2021-12-06 PROCEDURE — 27000221 HC OXYGEN, UP TO 24 HOURS

## 2021-12-06 PROCEDURE — 83880 ASSAY OF NATRIURETIC PEPTIDE: CPT | Performed by: PHYSICIAN ASSISTANT

## 2021-12-06 PROCEDURE — 25000242 PHARM REV CODE 250 ALT 637 W/ HCPCS: Performed by: STUDENT IN AN ORGANIZED HEALTH CARE EDUCATION/TRAINING PROGRAM

## 2021-12-06 PROCEDURE — 25000003 PHARM REV CODE 250: Performed by: NURSE PRACTITIONER

## 2021-12-06 PROCEDURE — 94640 AIRWAY INHALATION TREATMENT: CPT

## 2021-12-06 PROCEDURE — 80048 BASIC METABOLIC PNL TOTAL CA: CPT | Mod: 91 | Performed by: INTERNAL MEDICINE

## 2021-12-06 PROCEDURE — 99900035 HC TECH TIME PER 15 MIN (STAT)

## 2021-12-06 PROCEDURE — 86480 TB TEST CELL IMMUN MEASURE: CPT | Performed by: INTERNAL MEDICINE

## 2021-12-06 PROCEDURE — 63600175 PHARM REV CODE 636 W HCPCS: Performed by: PHYSICIAN ASSISTANT

## 2021-12-06 PROCEDURE — 94761 N-INVAS EAR/PLS OXIMETRY MLT: CPT

## 2021-12-06 PROCEDURE — 85025 COMPLETE CBC W/AUTO DIFF WBC: CPT | Performed by: PHYSICIAN ASSISTANT

## 2021-12-06 PROCEDURE — 20600001 HC STEP DOWN PRIVATE ROOM

## 2021-12-06 PROCEDURE — 25000003 PHARM REV CODE 250: Performed by: PHYSICIAN ASSISTANT

## 2021-12-06 PROCEDURE — 36415 COLL VENOUS BLD VENIPUNCTURE: CPT | Performed by: INTERNAL MEDICINE

## 2021-12-06 PROCEDURE — 81025 URINE PREGNANCY TEST: CPT | Performed by: INTERNAL MEDICINE

## 2021-12-06 PROCEDURE — 99233 PR SUBSEQUENT HOSPITAL CARE,LEVL III: ICD-10-PCS | Mod: ,,, | Performed by: INTERNAL MEDICINE

## 2021-12-06 PROCEDURE — 80076 HEPATIC FUNCTION PANEL: CPT | Performed by: PHYSICIAN ASSISTANT

## 2021-12-06 PROCEDURE — 25000003 PHARM REV CODE 250: Performed by: INTERNAL MEDICINE

## 2021-12-06 PROCEDURE — 83735 ASSAY OF MAGNESIUM: CPT | Performed by: INTERNAL MEDICINE

## 2021-12-06 RX ADMIN — METOPROLOL SUCCINATE 100 MG: 50 TABLET, EXTENDED RELEASE ORAL at 08:12

## 2021-12-06 RX ADMIN — ACETAMINOPHEN 650 MG: 325 TABLET ORAL at 08:12

## 2021-12-06 RX ADMIN — Medication 6 MG: at 08:12

## 2021-12-06 RX ADMIN — FLUTICASONE FUROATE AND VILANTEROL TRIFENATATE 1 PUFF: 100; 25 POWDER RESPIRATORY (INHALATION) at 09:12

## 2021-12-06 RX ADMIN — ISOSORBIDE DINITRATE: 20 TABLET ORAL at 09:12

## 2021-12-06 RX ADMIN — ATORVASTATIN CALCIUM 40 MG: 20 TABLET, FILM COATED ORAL at 09:12

## 2021-12-06 RX ADMIN — FUROSEMIDE 20 MG/HR: 10 INJECTION, SOLUTION INTRAMUSCULAR; INTRAVENOUS at 09:12

## 2021-12-06 RX ADMIN — TIOTROPIUM BROMIDE INHALATION SPRAY 2 PUFF: 3.12 SPRAY, METERED RESPIRATORY (INHALATION) at 09:12

## 2021-12-06 RX ADMIN — MONTELUKAST 10 MG: 10 TABLET, FILM COATED ORAL at 09:12

## 2021-12-06 RX ADMIN — SACUBITRIL AND VALSARTAN 1 TABLET: 24; 26 TABLET, FILM COATED ORAL at 08:12

## 2021-12-06 RX ADMIN — GUAIFENESIN 200 MG: 100 SOLUTION ORAL at 04:12

## 2021-12-06 RX ADMIN — FLUTICASONE PROPIONATE 100 MCG: 50 SPRAY, METERED NASAL at 09:12

## 2021-12-06 RX ADMIN — SACUBITRIL AND VALSARTAN 1 TABLET: 24; 26 TABLET, FILM COATED ORAL at 09:12

## 2021-12-06 RX ADMIN — Medication 400 MG: at 08:12

## 2021-12-06 RX ADMIN — PANTOPRAZOLE SODIUM 40 MG: 40 TABLET, DELAYED RELEASE ORAL at 09:12

## 2021-12-06 RX ADMIN — ALLOPURINOL 100 MG: 100 TABLET ORAL at 09:12

## 2021-12-06 RX ADMIN — SERTRALINE HYDROCHLORIDE 25 MG: 25 TABLET ORAL at 09:12

## 2021-12-06 RX ADMIN — Medication 400 MG: at 09:12

## 2021-12-06 RX ADMIN — METOPROLOL SUCCINATE 100 MG: 50 TABLET, EXTENDED RELEASE ORAL at 09:12

## 2021-12-06 RX ADMIN — ISOSORBIDE DINITRATE: 20 TABLET ORAL at 11:12

## 2021-12-06 RX ADMIN — SPIRONOLACTONE 25 MG: 25 TABLET ORAL at 09:12

## 2021-12-07 ENCOUNTER — TELEPHONE (OUTPATIENT)
Dept: TRANSPLANT | Facility: CLINIC | Age: 56
End: 2021-12-07
Payer: MEDICAID

## 2021-12-07 ENCOUNTER — DOCUMENTATION ONLY (OUTPATIENT)
Dept: TRANSFUSION MEDICINE | Facility: HOSPITAL | Age: 56
End: 2021-12-07
Payer: MEDICAID

## 2021-12-07 DIAGNOSIS — Z76.82 HEART TRANSPLANT CANDIDATE: ICD-10-CM

## 2021-12-07 DIAGNOSIS — Z76.82 HEART TRANSPLANT CANDIDATE: Primary | ICD-10-CM

## 2021-12-07 LAB
ACANTHOCYTES BLD QL SMEAR: PRESENT
ALLENS TEST: ABNORMAL
ANION GAP SERPL CALC-SCNC: 12 MMOL/L (ref 8–16)
ANION GAP SERPL CALC-SCNC: 15 MMOL/L (ref 8–16)
ANION GAP SERPL CALC-SCNC: 8 MMOL/L (ref 8–16)
ANISOCYTOSIS BLD QL SMEAR: ABNORMAL
BASOPHILS # BLD AUTO: 0.02 K/UL (ref 0–0.2)
BASOPHILS NFR BLD: 0.5 % (ref 0–1.9)
BUN SERPL-MCNC: 48 MG/DL (ref 6–20)
BUN SERPL-MCNC: 50 MG/DL (ref 6–20)
BUN SERPL-MCNC: 50 MG/DL (ref 6–20)
CALCIUM SERPL-MCNC: 9.4 MG/DL (ref 8.7–10.5)
CALCIUM SERPL-MCNC: 9.5 MG/DL (ref 8.7–10.5)
CALCIUM SERPL-MCNC: 9.5 MG/DL (ref 8.7–10.5)
CHLORIDE SERPL-SCNC: 97 MMOL/L (ref 95–110)
CHLORIDE SERPL-SCNC: 97 MMOL/L (ref 95–110)
CHLORIDE SERPL-SCNC: 99 MMOL/L (ref 95–110)
CMV IGG SERPL QL IA: REACTIVE
CO2 SERPL-SCNC: 28 MMOL/L (ref 23–29)
CO2 SERPL-SCNC: 33 MMOL/L (ref 23–29)
CO2 SERPL-SCNC: 33 MMOL/L (ref 23–29)
CREAT SERPL-MCNC: 1.6 MG/DL (ref 0.5–1.4)
CREAT SERPL-MCNC: 1.8 MG/DL (ref 0.5–1.4)
CREAT SERPL-MCNC: 2.8 MG/DL (ref 0.5–1.4)
DACRYOCYTES BLD QL SMEAR: ABNORMAL
DELSYS: ABNORMAL
DIFFERENTIAL METHOD: ABNORMAL
EBV VCA IGG SER QL IA: POSITIVE
EOSINOPHIL # BLD AUTO: 0.2 K/UL (ref 0–0.5)
EOSINOPHIL NFR BLD: 4.1 % (ref 0–8)
ERYTHROCYTE [DISTWIDTH] IN BLOOD BY AUTOMATED COUNT: 28.4 % (ref 11.5–14.5)
EST. GFR  (AFRICAN AMERICAN): 21 ML/MIN/1.73 M^2
EST. GFR  (AFRICAN AMERICAN): 35.8 ML/MIN/1.73 M^2
EST. GFR  (AFRICAN AMERICAN): 41.2 ML/MIN/1.73 M^2
EST. GFR  (NON AFRICAN AMERICAN): 18.2 ML/MIN/1.73 M^2
EST. GFR  (NON AFRICAN AMERICAN): 31 ML/MIN/1.73 M^2
EST. GFR  (NON AFRICAN AMERICAN): 35.8 ML/MIN/1.73 M^2
FLOW: 2
GAMMA INTERFERON BACKGROUND BLD IA-ACNC: 0.03 IU/ML
GLUCOSE SERPL-MCNC: 110 MG/DL (ref 70–110)
GLUCOSE SERPL-MCNC: 120 MG/DL (ref 70–110)
GLUCOSE SERPL-MCNC: 212 MG/DL (ref 70–110)
HCO3 UR-SCNC: 40 MMOL/L (ref 24–28)
HCT VFR BLD AUTO: 40.7 % (ref 37–48.5)
HCT VFR BLD CALC: 45 %PCV (ref 36–54)
HGB BLD-MCNC: 11.6 G/DL (ref 12–16)
HOWELL-JOLLY BOD BLD QL SMEAR: ABNORMAL
HSV1 IGG SERPL QL IA: POSITIVE
HSV2 IGG SERPL QL IA: POSITIVE
HYPOCHROMIA BLD QL SMEAR: ABNORMAL
IMM GRANULOCYTES # BLD AUTO: 0 K/UL (ref 0–0.04)
IMM GRANULOCYTES NFR BLD AUTO: 0 % (ref 0–0.5)
LYMPHOCYTES # BLD AUTO: 1.2 K/UL (ref 1–4.8)
LYMPHOCYTES NFR BLD: 31.1 % (ref 18–48)
M TB IFN-G CD4+ BCKGRND COR BLD-ACNC: -0.01 IU/ML
MAGNESIUM SERPL-MCNC: 2.3 MG/DL (ref 1.6–2.6)
MAGNESIUM SERPL-MCNC: 2.5 MG/DL (ref 1.6–2.6)
MCH RBC QN AUTO: 17.5 PG (ref 27–31)
MCHC RBC AUTO-ENTMCNC: 28.5 G/DL (ref 32–36)
MCV RBC AUTO: 62 FL (ref 82–98)
MITOGEN IGNF BCKGRD COR BLD-ACNC: 2.64 IU/ML
MODE: ABNORMAL
MONOCYTES # BLD AUTO: 0.6 K/UL (ref 0.3–1)
MONOCYTES NFR BLD: 14.7 % (ref 4–15)
NEUTROPHILS # BLD AUTO: 2 K/UL (ref 1.8–7.7)
NEUTROPHILS NFR BLD: 49.6 % (ref 38–73)
NRBC BLD-RTO: 0 /100 WBC
OVALOCYTES BLD QL SMEAR: ABNORMAL
PCO2 BLDA: 60.7 MMHG (ref 35–45)
PH SMN: 7.43 [PH] (ref 7.35–7.45)
PLATELET # BLD AUTO: 138 K/UL (ref 150–450)
PLATELET BLD QL SMEAR: ABNORMAL
PMV BLD AUTO: ABNORMAL FL (ref 9.2–12.9)
PO2 BLDA: 26 MMHG (ref 40–60)
POC BE: 16 MMOL/L
POC IONIZED CALCIUM: 1.24 MMOL/L (ref 1.06–1.42)
POC SATURATED O2: 46 % (ref 95–100)
POC TCO2: 42 MMOL/L (ref 24–29)
POCT GLUCOSE: 133 MG/DL (ref 70–110)
POCT GLUCOSE: 136 MG/DL (ref 70–110)
POCT GLUCOSE: 158 MG/DL (ref 70–110)
POIKILOCYTOSIS BLD QL SMEAR: ABNORMAL
POLYCHROMASIA BLD QL SMEAR: ABNORMAL
POTASSIUM BLD-SCNC: 3.8 MMOL/L (ref 3.5–5.1)
POTASSIUM SERPL-SCNC: 4.2 MMOL/L (ref 3.5–5.1)
POTASSIUM SERPL-SCNC: 4.4 MMOL/L (ref 3.5–5.1)
POTASSIUM SERPL-SCNC: 8.4 MMOL/L (ref 3.5–5.1)
RBC # BLD AUTO: 6.62 M/UL (ref 4–5.4)
SAMPLE: ABNORMAL
SCHISTOCYTES BLD QL SMEAR: ABNORMAL
SCHISTOCYTES BLD QL SMEAR: PRESENT
SITE: ABNORMAL
SODIUM BLD-SCNC: 141 MMOL/L (ref 136–145)
SODIUM SERPL-SCNC: 138 MMOL/L (ref 136–145)
SODIUM SERPL-SCNC: 142 MMOL/L (ref 136–145)
SODIUM SERPL-SCNC: 142 MMOL/L (ref 136–145)
SPHEROCYTES BLD QL SMEAR: ABNORMAL
STRONGYLOIDES ANTIBODY IGG: NEGATIVE
TARGETS BLD QL SMEAR: ABNORMAL
TB GOLD PLUS: NEGATIVE
TB2 - NIL: -0.01 IU/ML
WBC # BLD AUTO: 3.95 K/UL (ref 3.9–12.7)

## 2021-12-07 PROCEDURE — 82803 BLOOD GASES ANY COMBINATION: CPT

## 2021-12-07 PROCEDURE — 85025 COMPLETE CBC W/AUTO DIFF WBC: CPT | Performed by: PHYSICIAN ASSISTANT

## 2021-12-07 PROCEDURE — 25000003 PHARM REV CODE 250: Performed by: INTERNAL MEDICINE

## 2021-12-07 PROCEDURE — 27000221 HC OXYGEN, UP TO 24 HOURS

## 2021-12-07 PROCEDURE — 20600001 HC STEP DOWN PRIVATE ROOM

## 2021-12-07 PROCEDURE — 36415 COLL VENOUS BLD VENIPUNCTURE: CPT | Performed by: INTERNAL MEDICINE

## 2021-12-07 PROCEDURE — 36415 COLL VENOUS BLD VENIPUNCTURE: CPT | Performed by: PHYSICIAN ASSISTANT

## 2021-12-07 PROCEDURE — 80502 PR  LAB PATHOLOGY CONSULT-COMPLETE: ICD-10-PCS | Mod: TXP,,, | Performed by: PATHOLOGY

## 2021-12-07 PROCEDURE — 25000003 PHARM REV CODE 250: Performed by: PHYSICIAN ASSISTANT

## 2021-12-07 PROCEDURE — 84132 ASSAY OF SERUM POTASSIUM: CPT

## 2021-12-07 PROCEDURE — 84295 ASSAY OF SERUM SODIUM: CPT

## 2021-12-07 PROCEDURE — 80048 BASIC METABOLIC PNL TOTAL CA: CPT | Performed by: STUDENT IN AN ORGANIZED HEALTH CARE EDUCATION/TRAINING PROGRAM

## 2021-12-07 PROCEDURE — 80502 PR  LAB PATHOLOGY CONSULT-COMPLETE: CPT | Mod: TXP,,, | Performed by: PATHOLOGY

## 2021-12-07 PROCEDURE — 80048 BASIC METABOLIC PNL TOTAL CA: CPT | Mod: 91 | Performed by: INTERNAL MEDICINE

## 2021-12-07 PROCEDURE — 85014 HEMATOCRIT: CPT

## 2021-12-07 PROCEDURE — 99900035 HC TECH TIME PER 15 MIN (STAT)

## 2021-12-07 PROCEDURE — C1751 CATH, INF, PER/CENT/MIDLINE: HCPCS | Performed by: INTERNAL MEDICINE

## 2021-12-07 PROCEDURE — 36415 COLL VENOUS BLD VENIPUNCTURE: CPT | Performed by: STUDENT IN AN ORGANIZED HEALTH CARE EDUCATION/TRAINING PROGRAM

## 2021-12-07 PROCEDURE — 93451 PR RIGHT HEART CATH O2 SATURATION & CARDIAC OUTPUT: ICD-10-PCS | Mod: 26,,, | Performed by: INTERNAL MEDICINE

## 2021-12-07 PROCEDURE — 94761 N-INVAS EAR/PLS OXIMETRY MLT: CPT

## 2021-12-07 PROCEDURE — 99233 SBSQ HOSP IP/OBS HIGH 50: CPT | Mod: 25,,, | Performed by: INTERNAL MEDICINE

## 2021-12-07 PROCEDURE — 94640 AIRWAY INHALATION TREATMENT: CPT

## 2021-12-07 PROCEDURE — 97164 PT RE-EVAL EST PLAN CARE: CPT

## 2021-12-07 PROCEDURE — 93451 RIGHT HEART CATH: CPT | Performed by: INTERNAL MEDICINE

## 2021-12-07 PROCEDURE — 97168 OT RE-EVAL EST PLAN CARE: CPT

## 2021-12-07 PROCEDURE — C1894 INTRO/SHEATH, NON-LASER: HCPCS | Performed by: INTERNAL MEDICINE

## 2021-12-07 PROCEDURE — 83735 ASSAY OF MAGNESIUM: CPT | Mod: 91 | Performed by: INTERNAL MEDICINE

## 2021-12-07 PROCEDURE — 99233 PR SUBSEQUENT HOSPITAL CARE,LEVL III: ICD-10-PCS | Mod: 25,,, | Performed by: INTERNAL MEDICINE

## 2021-12-07 PROCEDURE — 25000003 PHARM REV CODE 250: Performed by: STUDENT IN AN ORGANIZED HEALTH CARE EDUCATION/TRAINING PROGRAM

## 2021-12-07 PROCEDURE — 93451 RIGHT HEART CATH: CPT | Mod: 26,,, | Performed by: INTERNAL MEDICINE

## 2021-12-07 PROCEDURE — 63600175 PHARM REV CODE 636 W HCPCS: Performed by: INTERNAL MEDICINE

## 2021-12-07 RX ORDER — LIDOCAINE HYDROCHLORIDE AND EPINEPHRINE 10; 10 MG/ML; UG/ML
INJECTION, SOLUTION INFILTRATION; PERINEURAL
Status: DISCONTINUED | OUTPATIENT
Start: 2021-12-07 | End: 2021-12-07 | Stop reason: HOSPADM

## 2021-12-07 RX ORDER — ALBUTEROL SULFATE 2.5 MG/.5ML
2.5 SOLUTION RESPIRATORY (INHALATION) EVERY 4 HOURS PRN
Status: DISCONTINUED | OUTPATIENT
Start: 2021-12-07 | End: 2021-12-09 | Stop reason: HOSPADM

## 2021-12-07 RX ORDER — SODIUM CHLORIDE 0.9 G/100ML
IRRIGANT IRRIGATION
Status: DISCONTINUED | OUTPATIENT
Start: 2021-12-07 | End: 2021-12-07 | Stop reason: HOSPADM

## 2021-12-07 RX ORDER — FUROSEMIDE 10 MG/ML
80 INJECTION INTRAMUSCULAR; INTRAVENOUS EVERY 8 HOURS
Status: DISCONTINUED | OUTPATIENT
Start: 2021-12-07 | End: 2021-12-08

## 2021-12-07 RX ADMIN — SACUBITRIL AND VALSARTAN 1 TABLET: 24; 26 TABLET, FILM COATED ORAL at 09:12

## 2021-12-07 RX ADMIN — Medication 400 MG: at 09:12

## 2021-12-07 RX ADMIN — SACUBITRIL AND VALSARTAN 1 TABLET: 49; 51 TABLET, FILM COATED ORAL at 09:12

## 2021-12-07 RX ADMIN — Medication 6 MG: at 09:12

## 2021-12-07 RX ADMIN — SERTRALINE HYDROCHLORIDE 25 MG: 25 TABLET ORAL at 09:12

## 2021-12-07 RX ADMIN — METOPROLOL SUCCINATE 100 MG: 50 TABLET, EXTENDED RELEASE ORAL at 09:12

## 2021-12-07 RX ADMIN — PANTOPRAZOLE SODIUM 40 MG: 40 TABLET, DELAYED RELEASE ORAL at 09:12

## 2021-12-07 RX ADMIN — TIOTROPIUM BROMIDE INHALATION SPRAY 2 PUFF: 3.12 SPRAY, METERED RESPIRATORY (INHALATION) at 08:12

## 2021-12-07 RX ADMIN — ACETAMINOPHEN 650 MG: 325 TABLET ORAL at 02:12

## 2021-12-07 RX ADMIN — FLUTICASONE FUROATE AND VILANTEROL TRIFENATATE 1 PUFF: 100; 25 POWDER RESPIRATORY (INHALATION) at 08:12

## 2021-12-07 RX ADMIN — ATORVASTATIN CALCIUM 40 MG: 20 TABLET, FILM COATED ORAL at 09:12

## 2021-12-07 RX ADMIN — ALLOPURINOL 100 MG: 100 TABLET ORAL at 09:12

## 2021-12-07 RX ADMIN — FUROSEMIDE 80 MG: 20 INJECTION, SOLUTION INTRAMUSCULAR; INTRAVENOUS at 09:12

## 2021-12-07 RX ADMIN — SPIRONOLACTONE 25 MG: 25 TABLET ORAL at 09:12

## 2021-12-07 RX ADMIN — ACETAMINOPHEN 650 MG: 325 TABLET ORAL at 11:12

## 2021-12-07 RX ADMIN — ISOSORBIDE DINITRATE: 20 TABLET ORAL at 09:12

## 2021-12-08 LAB
ALBUMIN SERPL BCP-MCNC: 3.4 G/DL (ref 3.5–5.2)
ALP SERPL-CCNC: 46 U/L (ref 55–135)
ALT SERPL W/O P-5'-P-CCNC: 29 U/L (ref 10–44)
ANION GAP SERPL CALC-SCNC: 12 MMOL/L (ref 8–16)
ANION GAP SERPL CALC-SCNC: 12 MMOL/L (ref 8–16)
ANISOCYTOSIS BLD QL SMEAR: ABNORMAL
AST SERPL-CCNC: 26 U/L (ref 10–40)
BASOPHILS # BLD AUTO: 0.04 K/UL (ref 0–0.2)
BASOPHILS NFR BLD: 1.1 % (ref 0–1.9)
BILIRUB DIRECT SERPL-MCNC: 0.7 MG/DL (ref 0.1–0.3)
BILIRUB SERPL-MCNC: 2.1 MG/DL (ref 0.1–1)
BNP SERPL-MCNC: 1432 PG/ML (ref 0–99)
BUN SERPL-MCNC: 49 MG/DL (ref 6–20)
BUN SERPL-MCNC: 54 MG/DL (ref 6–20)
CALCIUM SERPL-MCNC: 9.4 MG/DL (ref 8.7–10.5)
CALCIUM SERPL-MCNC: 9.7 MG/DL (ref 8.7–10.5)
CHLORIDE SERPL-SCNC: 97 MMOL/L (ref 95–110)
CHLORIDE SERPL-SCNC: 98 MMOL/L (ref 95–110)
CO2 SERPL-SCNC: 30 MMOL/L (ref 23–29)
CO2 SERPL-SCNC: 31 MMOL/L (ref 23–29)
CREAT SERPL-MCNC: 2.1 MG/DL (ref 0.5–1.4)
CREAT SERPL-MCNC: 2.2 MG/DL (ref 0.5–1.4)
DACRYOCYTES BLD QL SMEAR: ABNORMAL
DIFFERENTIAL METHOD: ABNORMAL
EOSINOPHIL # BLD AUTO: 0.2 K/UL (ref 0–0.5)
EOSINOPHIL NFR BLD: 5.7 % (ref 0–8)
ERYTHROCYTE [DISTWIDTH] IN BLOOD BY AUTOMATED COUNT: 27.3 % (ref 11.5–14.5)
EST. GFR  (AFRICAN AMERICAN): 28.1 ML/MIN/1.73 M^2
EST. GFR  (AFRICAN AMERICAN): 29.7 ML/MIN/1.73 M^2
EST. GFR  (NON AFRICAN AMERICAN): 24.3 ML/MIN/1.73 M^2
EST. GFR  (NON AFRICAN AMERICAN): 25.7 ML/MIN/1.73 M^2
GLUCOSE SERPL-MCNC: 142 MG/DL (ref 70–110)
GLUCOSE SERPL-MCNC: 154 MG/DL (ref 70–110)
HCT VFR BLD AUTO: 37.9 % (ref 37–48.5)
HGB BLD-MCNC: 10.8 G/DL (ref 12–16)
HYPOCHROMIA BLD QL SMEAR: ABNORMAL
IMM GRANULOCYTES # BLD AUTO: 0.01 K/UL (ref 0–0.04)
IMM GRANULOCYTES NFR BLD AUTO: 0.3 % (ref 0–0.5)
LEFT ABI: 1.18
LEFT ARM BP: 105 MMHG
LEFT DORSALIS PEDIS: 124 MMHG
LEFT POSTERIOR TIBIAL: 123 MMHG
LYMPHOCYTES # BLD AUTO: 1 K/UL (ref 1–4.8)
LYMPHOCYTES NFR BLD: 28.3 % (ref 18–48)
MAGNESIUM SERPL-MCNC: 2.3 MG/DL (ref 1.6–2.6)
MAGNESIUM SERPL-MCNC: 2.4 MG/DL (ref 1.6–2.6)
MCH RBC QN AUTO: 17.5 PG (ref 27–31)
MCHC RBC AUTO-ENTMCNC: 28.5 G/DL (ref 32–36)
MCV RBC AUTO: 62 FL (ref 82–98)
MONOCYTES # BLD AUTO: 0.6 K/UL (ref 0.3–1)
MONOCYTES NFR BLD: 17 % (ref 4–15)
NEUTROPHILS # BLD AUTO: 1.7 K/UL (ref 1.8–7.7)
NEUTROPHILS NFR BLD: 47.6 % (ref 38–73)
NRBC BLD-RTO: 0 /100 WBC
OVALOCYTES BLD QL SMEAR: ABNORMAL
PLATELET # BLD AUTO: 139 K/UL (ref 150–450)
PLATELET BLD QL SMEAR: ABNORMAL
PMV BLD AUTO: ABNORMAL FL (ref 9.2–12.9)
POCT GLUCOSE: 142 MG/DL (ref 70–110)
POCT GLUCOSE: 158 MG/DL (ref 70–110)
POCT GLUCOSE: 159 MG/DL (ref 70–110)
POCT GLUCOSE: 214 MG/DL (ref 70–110)
POIKILOCYTOSIS BLD QL SMEAR: ABNORMAL
POLYCHROMASIA BLD QL SMEAR: ABNORMAL
POTASSIUM SERPL-SCNC: 3.8 MMOL/L (ref 3.5–5.1)
POTASSIUM SERPL-SCNC: 3.9 MMOL/L (ref 3.5–5.1)
PROT SERPL-MCNC: 6.2 G/DL (ref 6–8.4)
RBC # BLD AUTO: 6.16 M/UL (ref 4–5.4)
RIGHT ABI: 1.21
RIGHT ARM BP: 97 MMHG
RIGHT DORSALIS PEDIS: 112 MMHG
RIGHT POSTERIOR TIBIAL: 127 MMHG
SCHISTOCYTES BLD QL SMEAR: ABNORMAL
SCHISTOCYTES BLD QL SMEAR: ABNORMAL
SODIUM SERPL-SCNC: 140 MMOL/L (ref 136–145)
SODIUM SERPL-SCNC: 140 MMOL/L (ref 136–145)
T GONDII IGG SER QL IA: NORMAL
T GONDII IGG SERPL IA-ACNC: <5 IU/ML (ref 0–6.4)
TARGETS BLD QL SMEAR: ABNORMAL
VARICELLA INTERPRETATION: POSITIVE
VARICELLA ZOSTER IGG: 2.66 ISR (ref 0–0.9)
WBC # BLD AUTO: 3.53 K/UL (ref 3.9–12.7)

## 2021-12-08 PROCEDURE — 25000003 PHARM REV CODE 250: Performed by: INTERNAL MEDICINE

## 2021-12-08 PROCEDURE — 25000003 PHARM REV CODE 250: Performed by: STUDENT IN AN ORGANIZED HEALTH CARE EDUCATION/TRAINING PROGRAM

## 2021-12-08 PROCEDURE — 36415 COLL VENOUS BLD VENIPUNCTURE: CPT | Performed by: INTERNAL MEDICINE

## 2021-12-08 PROCEDURE — 85025 COMPLETE CBC W/AUTO DIFF WBC: CPT | Performed by: PHYSICIAN ASSISTANT

## 2021-12-08 PROCEDURE — 80048 BASIC METABOLIC PNL TOTAL CA: CPT | Performed by: INTERNAL MEDICINE

## 2021-12-08 PROCEDURE — 83880 ASSAY OF NATRIURETIC PEPTIDE: CPT | Performed by: PHYSICIAN ASSISTANT

## 2021-12-08 PROCEDURE — 99222 PR INITIAL HOSPITAL CARE,LEVL II: ICD-10-PCS | Mod: AF,HB,, | Performed by: PSYCHIATRY & NEUROLOGY

## 2021-12-08 PROCEDURE — 83735 ASSAY OF MAGNESIUM: CPT | Mod: 91 | Performed by: INTERNAL MEDICINE

## 2021-12-08 PROCEDURE — 83735 ASSAY OF MAGNESIUM: CPT | Performed by: INTERNAL MEDICINE

## 2021-12-08 PROCEDURE — 27000221 HC OXYGEN, UP TO 24 HOURS

## 2021-12-08 PROCEDURE — 99233 PR SUBSEQUENT HOSPITAL CARE,LEVL III: ICD-10-PCS | Mod: ,,, | Performed by: INTERNAL MEDICINE

## 2021-12-08 PROCEDURE — 99900035 HC TECH TIME PER 15 MIN (STAT)

## 2021-12-08 PROCEDURE — 99222 1ST HOSP IP/OBS MODERATE 55: CPT | Mod: AF,HB,, | Performed by: PSYCHIATRY & NEUROLOGY

## 2021-12-08 PROCEDURE — 94640 AIRWAY INHALATION TREATMENT: CPT

## 2021-12-08 PROCEDURE — 80076 HEPATIC FUNCTION PANEL: CPT | Performed by: PHYSICIAN ASSISTANT

## 2021-12-08 PROCEDURE — 99233 SBSQ HOSP IP/OBS HIGH 50: CPT | Mod: ,,, | Performed by: INTERNAL MEDICINE

## 2021-12-08 PROCEDURE — 80048 BASIC METABOLIC PNL TOTAL CA: CPT | Mod: 91 | Performed by: INTERNAL MEDICINE

## 2021-12-08 PROCEDURE — 20600001 HC STEP DOWN PRIVATE ROOM

## 2021-12-08 PROCEDURE — 63600175 PHARM REV CODE 636 W HCPCS: Performed by: INTERNAL MEDICINE

## 2021-12-08 PROCEDURE — 25000003 PHARM REV CODE 250: Performed by: PHYSICIAN ASSISTANT

## 2021-12-08 PROCEDURE — 94761 N-INVAS EAR/PLS OXIMETRY MLT: CPT

## 2021-12-08 RX ORDER — SERTRALINE HYDROCHLORIDE 50 MG/1
50 TABLET, FILM COATED ORAL DAILY
Status: DISCONTINUED | OUTPATIENT
Start: 2021-12-09 | End: 2021-12-09 | Stop reason: HOSPADM

## 2021-12-08 RX ORDER — FUROSEMIDE 10 MG/ML
80 INJECTION INTRAMUSCULAR; INTRAVENOUS
Status: DISCONTINUED | OUTPATIENT
Start: 2021-12-08 | End: 2021-12-09

## 2021-12-08 RX ADMIN — FUROSEMIDE 80 MG: 20 INJECTION, SOLUTION INTRAMUSCULAR; INTRAVENOUS at 12:12

## 2021-12-08 RX ADMIN — SPIRONOLACTONE 25 MG: 25 TABLET ORAL at 09:12

## 2021-12-08 RX ADMIN — SACUBITRIL AND VALSARTAN 1 TABLET: 49; 51 TABLET, FILM COATED ORAL at 09:12

## 2021-12-08 RX ADMIN — FLUTICASONE FUROATE AND VILANTEROL TRIFENATATE 1 PUFF: 100; 25 POWDER RESPIRATORY (INHALATION) at 08:12

## 2021-12-08 RX ADMIN — APIXABAN 5 MG: 5 TABLET, FILM COATED ORAL at 09:12

## 2021-12-08 RX ADMIN — FUROSEMIDE 80 MG: 20 INJECTION, SOLUTION INTRAMUSCULAR; INTRAVENOUS at 05:12

## 2021-12-08 RX ADMIN — ATORVASTATIN CALCIUM 40 MG: 20 TABLET, FILM COATED ORAL at 09:12

## 2021-12-08 RX ADMIN — Medication 400 MG: at 09:12

## 2021-12-08 RX ADMIN — METOPROLOL SUCCINATE 100 MG: 50 TABLET, EXTENDED RELEASE ORAL at 09:12

## 2021-12-08 RX ADMIN — ISOSORBIDE DINITRATE: 20 TABLET ORAL at 09:12

## 2021-12-08 RX ADMIN — PANTOPRAZOLE SODIUM 40 MG: 40 TABLET, DELAYED RELEASE ORAL at 09:12

## 2021-12-08 RX ADMIN — ISOSORBIDE DINITRATE: 20 TABLET ORAL at 10:12

## 2021-12-08 RX ADMIN — SERTRALINE HYDROCHLORIDE 25 MG: 25 TABLET ORAL at 09:12

## 2021-12-08 RX ADMIN — FUROSEMIDE 80 MG: 20 INJECTION, SOLUTION INTRAMUSCULAR; INTRAVENOUS at 11:12

## 2021-12-08 RX ADMIN — Medication 6 MG: at 09:12

## 2021-12-08 RX ADMIN — TIOTROPIUM BROMIDE INHALATION SPRAY 2 PUFF: 3.12 SPRAY, METERED RESPIRATORY (INHALATION) at 08:12

## 2021-12-08 RX ADMIN — APIXABAN 5 MG: 5 TABLET, FILM COATED ORAL at 10:12

## 2021-12-09 ENCOUNTER — TELEPHONE (OUTPATIENT)
Dept: INFUSION THERAPY | Facility: HOSPITAL | Age: 56
End: 2021-12-09
Payer: MEDICAID

## 2021-12-09 ENCOUNTER — TELEPHONE (OUTPATIENT)
Dept: TRANSPLANT | Facility: CLINIC | Age: 56
End: 2021-12-09
Payer: MEDICAID

## 2021-12-09 VITALS
WEIGHT: 207.69 LBS | DIASTOLIC BLOOD PRESSURE: 64 MMHG | HEIGHT: 66 IN | RESPIRATION RATE: 20 BRPM | HEART RATE: 62 BPM | OXYGEN SATURATION: 95 % | SYSTOLIC BLOOD PRESSURE: 113 MMHG | BODY MASS INDEX: 33.38 KG/M2 | TEMPERATURE: 98 F

## 2021-12-09 DIAGNOSIS — Z76.82 HEART TRANSPLANT CANDIDATE: Primary | ICD-10-CM

## 2021-12-09 DIAGNOSIS — Z76.82 HEART TRANSPLANT CANDIDATE: ICD-10-CM

## 2021-12-09 DIAGNOSIS — Z01.818 PRE-TRANSPLANT EVALUATION FOR HEART TRANSPLANT: ICD-10-CM

## 2021-12-09 DIAGNOSIS — Z01.818 PRE-TRANSPLANT EVALUATION FOR HEART TRANSPLANT: Primary | ICD-10-CM

## 2021-12-09 DIAGNOSIS — I50.43 ACUTE ON CHRONIC COMBINED SYSTOLIC AND DIASTOLIC CONGESTIVE HEART FAILURE: ICD-10-CM

## 2021-12-09 DIAGNOSIS — I50.42 CHRONIC COMBINED SYSTOLIC AND DIASTOLIC HEART FAILURE: Primary | ICD-10-CM

## 2021-12-09 DIAGNOSIS — Z76.82 ORGAN TRANSPLANT CANDIDATE: ICD-10-CM

## 2021-12-09 LAB
ANION GAP SERPL CALC-SCNC: 11 MMOL/L (ref 8–16)
ANISOCYTOSIS BLD QL SMEAR: ABNORMAL
BASOPHILS # BLD AUTO: 0.04 K/UL (ref 0–0.2)
BASOPHILS NFR BLD: 1.2 % (ref 0–1.9)
BUN SERPL-MCNC: 52 MG/DL (ref 6–20)
CALCIUM SERPL-MCNC: 9.5 MG/DL (ref 8.7–10.5)
CHLORIDE SERPL-SCNC: 101 MMOL/L (ref 95–110)
CO2 SERPL-SCNC: 32 MMOL/L (ref 23–29)
CREAT SERPL-MCNC: 1.8 MG/DL (ref 0.5–1.4)
DACRYOCYTES BLD QL SMEAR: ABNORMAL
DIFFERENTIAL METHOD: ABNORMAL
EOSINOPHIL # BLD AUTO: 0.2 K/UL (ref 0–0.5)
EOSINOPHIL NFR BLD: 4.8 % (ref 0–8)
ERYTHROCYTE [DISTWIDTH] IN BLOOD BY AUTOMATED COUNT: 26.7 % (ref 11.5–14.5)
EST. GFR  (AFRICAN AMERICAN): 35.8 ML/MIN/1.73 M^2
EST. GFR  (NON AFRICAN AMERICAN): 31 ML/MIN/1.73 M^2
GLUCOSE SERPL-MCNC: 123 MG/DL (ref 70–110)
HCT VFR BLD AUTO: 35.8 % (ref 37–48.5)
HGB BLD-MCNC: 10.1 G/DL (ref 12–16)
HOWELL-JOLLY BOD BLD QL SMEAR: ABNORMAL
HYPOCHROMIA BLD QL SMEAR: ABNORMAL
IMM GRANULOCYTES # BLD AUTO: 0.01 K/UL (ref 0–0.04)
IMM GRANULOCYTES NFR BLD AUTO: 0.3 % (ref 0–0.5)
LYMPHOCYTES # BLD AUTO: 1 K/UL (ref 1–4.8)
LYMPHOCYTES NFR BLD: 31.4 % (ref 18–48)
MAGNESIUM SERPL-MCNC: 2.4 MG/DL (ref 1.6–2.6)
MCH RBC QN AUTO: 17.3 PG (ref 27–31)
MCHC RBC AUTO-ENTMCNC: 28.2 G/DL (ref 32–36)
MCV RBC AUTO: 61 FL (ref 82–98)
MONOCYTES # BLD AUTO: 0.5 K/UL (ref 0.3–1)
MONOCYTES NFR BLD: 16.3 % (ref 4–15)
NEUTROPHILS # BLD AUTO: 1.5 K/UL (ref 1.8–7.7)
NEUTROPHILS NFR BLD: 46 % (ref 38–73)
NRBC BLD-RTO: 0 /100 WBC
OVALOCYTES BLD QL SMEAR: ABNORMAL
PLATELET # BLD AUTO: 125 K/UL (ref 150–450)
PLATELET BLD QL SMEAR: ABNORMAL
PMV BLD AUTO: ABNORMAL FL (ref 9.2–12.9)
POCT GLUCOSE: 146 MG/DL (ref 70–110)
POCT GLUCOSE: 149 MG/DL (ref 70–110)
POIKILOCYTOSIS BLD QL SMEAR: ABNORMAL
POLYCHROMASIA BLD QL SMEAR: ABNORMAL
POTASSIUM SERPL-SCNC: 3.7 MMOL/L (ref 3.5–5.1)
RBC # BLD AUTO: 5.85 M/UL (ref 4–5.4)
SCHISTOCYTES BLD QL SMEAR: ABNORMAL
SCHISTOCYTES BLD QL SMEAR: PRESENT
SODIUM SERPL-SCNC: 144 MMOL/L (ref 136–145)
SPHEROCYTES BLD QL SMEAR: ABNORMAL
TARGETS BLD QL SMEAR: ABNORMAL
WBC # BLD AUTO: 3.31 K/UL (ref 3.9–12.7)

## 2021-12-09 PROCEDURE — 25000003 PHARM REV CODE 250: Performed by: INTERNAL MEDICINE

## 2021-12-09 PROCEDURE — 86698 HISTOPLASMA ANTIBODY: CPT | Performed by: PHYSICIAN ASSISTANT

## 2021-12-09 PROCEDURE — 25000242 PHARM REV CODE 250 ALT 637 W/ HCPCS: Performed by: STUDENT IN AN ORGANIZED HEALTH CARE EDUCATION/TRAINING PROGRAM

## 2021-12-09 PROCEDURE — 90732 PPSV23 VACC 2 YRS+ SUBQ/IM: CPT | Performed by: PHYSICIAN ASSISTANT

## 2021-12-09 PROCEDURE — 94761 N-INVAS EAR/PLS OXIMETRY MLT: CPT

## 2021-12-09 PROCEDURE — 90686 IIV4 VACC NO PRSV 0.5 ML IM: CPT | Performed by: PHYSICIAN ASSISTANT

## 2021-12-09 PROCEDURE — 27000221 HC OXYGEN, UP TO 24 HOURS

## 2021-12-09 PROCEDURE — 36415 COLL VENOUS BLD VENIPUNCTURE: CPT | Performed by: PHYSICIAN ASSISTANT

## 2021-12-09 PROCEDURE — 25000003 PHARM REV CODE 250: Performed by: STUDENT IN AN ORGANIZED HEALTH CARE EDUCATION/TRAINING PROGRAM

## 2021-12-09 PROCEDURE — 80048 BASIC METABOLIC PNL TOTAL CA: CPT | Performed by: INTERNAL MEDICINE

## 2021-12-09 PROCEDURE — 90472 IMMUNIZATION ADMIN EACH ADD: CPT | Performed by: PHYSICIAN ASSISTANT

## 2021-12-09 PROCEDURE — 94640 AIRWAY INHALATION TREATMENT: CPT

## 2021-12-09 PROCEDURE — 90471 IMMUNIZATION ADMIN: CPT | Performed by: PHYSICIAN ASSISTANT

## 2021-12-09 PROCEDURE — 85025 COMPLETE CBC W/AUTO DIFF WBC: CPT | Performed by: PHYSICIAN ASSISTANT

## 2021-12-09 PROCEDURE — 83735 ASSAY OF MAGNESIUM: CPT | Performed by: INTERNAL MEDICINE

## 2021-12-09 PROCEDURE — 99233 SBSQ HOSP IP/OBS HIGH 50: CPT | Mod: ,,, | Performed by: INTERNAL MEDICINE

## 2021-12-09 PROCEDURE — 99223 1ST HOSP IP/OBS HIGH 75: CPT | Mod: ,,, | Performed by: PHYSICIAN ASSISTANT

## 2021-12-09 PROCEDURE — 99223 PR INITIAL HOSPITAL CARE,LEVL III: ICD-10-PCS | Mod: ,,, | Performed by: PHYSICIAN ASSISTANT

## 2021-12-09 PROCEDURE — 99233 PR SUBSEQUENT HOSPITAL CARE,LEVL III: ICD-10-PCS | Mod: ,,, | Performed by: INTERNAL MEDICINE

## 2021-12-09 PROCEDURE — 63600175 PHARM REV CODE 636 W HCPCS: Performed by: PHYSICIAN ASSISTANT

## 2021-12-09 PROCEDURE — 99900035 HC TECH TIME PER 15 MIN (STAT)

## 2021-12-09 PROCEDURE — 86635 COCCIDIOIDES ANTIBODY: CPT | Mod: 91 | Performed by: PHYSICIAN ASSISTANT

## 2021-12-09 RX ORDER — FUROSEMIDE 40 MG/1
80 TABLET ORAL 2 TIMES DAILY
Status: DISCONTINUED | OUTPATIENT
Start: 2021-12-09 | End: 2021-12-09 | Stop reason: HOSPADM

## 2021-12-09 RX ORDER — SPIRONOLACTONE 25 MG/1
25 TABLET ORAL DAILY
Qty: 30 TABLET | Refills: 11 | Status: SHIPPED | OUTPATIENT
Start: 2021-12-10 | End: 2022-06-21

## 2021-12-09 RX ORDER — FUROSEMIDE 80 MG/1
80 TABLET ORAL 2 TIMES DAILY
Qty: 60 TABLET | Refills: 11 | Status: SHIPPED | OUTPATIENT
Start: 2021-12-09 | End: 2021-12-22 | Stop reason: SDUPTHER

## 2021-12-09 RX ORDER — SERTRALINE HYDROCHLORIDE 50 MG/1
50 TABLET, FILM COATED ORAL DAILY
Qty: 30 TABLET | Refills: 11 | Status: SHIPPED | OUTPATIENT
Start: 2021-12-10 | End: 2022-06-21

## 2021-12-09 RX ADMIN — ISOSORBIDE DINITRATE: 20 TABLET ORAL at 09:12

## 2021-12-09 RX ADMIN — FLUTICASONE FUROATE AND VILANTEROL TRIFENATATE 1 PUFF: 100; 25 POWDER RESPIRATORY (INHALATION) at 08:12

## 2021-12-09 RX ADMIN — Medication 400 MG: at 08:12

## 2021-12-09 RX ADMIN — INFLUENZA VIRUS VACCINE 0.5 ML: 15; 15; 15; 15 SUSPENSION INTRAMUSCULAR at 12:12

## 2021-12-09 RX ADMIN — TIOTROPIUM BROMIDE INHALATION SPRAY 2 PUFF: 3.12 SPRAY, METERED RESPIRATORY (INHALATION) at 08:12

## 2021-12-09 RX ADMIN — APIXABAN 5 MG: 5 TABLET, FILM COATED ORAL at 08:12

## 2021-12-09 RX ADMIN — PNEUMOCOCCAL VACCINE POLYVALENT 0.5 ML
25; 25; 25; 25; 25; 25; 25; 25; 25; 25; 25; 25; 25; 25; 25; 25; 25; 25; 25; 25; 25; 25; 25 INJECTION, SOLUTION INTRAMUSCULAR; SUBCUTANEOUS at 01:12

## 2021-12-09 RX ADMIN — SERTRALINE HYDROCHLORIDE 50 MG: 50 TABLET ORAL at 08:12

## 2021-12-09 RX ADMIN — PANTOPRAZOLE SODIUM 40 MG: 40 TABLET, DELAYED RELEASE ORAL at 08:12

## 2021-12-09 RX ADMIN — FUROSEMIDE 80 MG: 40 TABLET ORAL at 08:12

## 2021-12-09 RX ADMIN — SPIRONOLACTONE 25 MG: 25 TABLET ORAL at 08:12

## 2021-12-09 RX ADMIN — SACUBITRIL AND VALSARTAN 1 TABLET: 49; 51 TABLET, FILM COATED ORAL at 08:12

## 2021-12-09 RX ADMIN — METOPROLOL SUCCINATE 100 MG: 50 TABLET, EXTENDED RELEASE ORAL at 08:12

## 2021-12-09 RX ADMIN — MONTELUKAST 10 MG: 10 TABLET, FILM COATED ORAL at 08:12

## 2021-12-09 RX ADMIN — ATORVASTATIN CALCIUM 40 MG: 20 TABLET, FILM COATED ORAL at 08:12

## 2021-12-10 ENCOUNTER — PATIENT OUTREACH (OUTPATIENT)
Dept: ADMINISTRATIVE | Facility: CLINIC | Age: 56
End: 2021-12-10
Payer: MEDICAID

## 2021-12-13 ENCOUNTER — TELEPHONE (OUTPATIENT)
Dept: FAMILY MEDICINE | Facility: CLINIC | Age: 56
End: 2021-12-13
Payer: MEDICAID

## 2021-12-13 DIAGNOSIS — E11.9 TYPE 2 DIABETES MELLITUS WITHOUT COMPLICATION, UNSPECIFIED WHETHER LONG TERM INSULIN USE: ICD-10-CM

## 2021-12-13 LAB
F2 C.20210G>A GENO BLD/T: NEGATIVE
F2 GENE MUT ANL BLD/T: NORMAL
VON WILLEBRAND EVAL PPP-IMP: NORMAL
VWF MULTIMERS PPP QL: NORMAL

## 2021-12-15 ENCOUNTER — TELEPHONE (OUTPATIENT)
Dept: FAMILY MEDICINE | Facility: CLINIC | Age: 56
End: 2021-12-15
Payer: MEDICAID

## 2021-12-15 ENCOUNTER — TELEPHONE (OUTPATIENT)
Dept: TRANSPLANT | Facility: CLINIC | Age: 56
End: 2021-12-15
Payer: MEDICAID

## 2021-12-15 ENCOUNTER — TELEPHONE (OUTPATIENT)
Dept: ENDOSCOPY | Facility: HOSPITAL | Age: 56
End: 2021-12-15
Payer: MEDICAID

## 2021-12-15 LAB
H CAPSUL AB SER QL ID: NEGATIVE
H CAPSUL MYC AB SER QL CF: NEGATIVE
H CAPSUL YST AB SER QL CF: NEGATIVE

## 2021-12-16 LAB
CLASS I ANTIBODIES - LUMINEX: NORMAL
CLASS I ANTIBODY COMMENTS - LUMINEX: NORMAL
CLASS II ANTIBODIES - LUMINEX: NEGATIVE
CPRA %: 0
CPRA %: 61
CPRA %: 61
SERUM COLLECTION DT - LUMINEX CLASS I: NORMAL
SERUM COLLECTION DT - LUMINEX CLASS II: NORMAL
SPCL1 TESTING DATE: NORMAL
SPCL2 TESTING DATE: NORMAL
SPLUA TESTING DATE: NORMAL

## 2021-12-20 LAB
B CELL RESULTS - XM AUTO: NEGATIVE
B MCS AVERAGE - XM AUTO: -25.1
FXMAU TESTING DATE: NORMAL
HLA AB QL: POSITIVE
HLA AB SERPL: POSITIVE
HLA DQA1 1: NORMAL
HLA DQA1 2: NORMAL
HLA DRB4 1: NORMAL
HLA-A 1 SERO. EQUIV: 2
HLA-A 1: NORMAL
HLA-A 2 SERO. EQUIV: 26
HLA-A 2: NORMAL
HLA-B 1 SERO. EQUIV: 63
HLA-B 1: NORMAL
HLA-B 2 SERO. EQUIV: 72
HLA-B 2: NORMAL
HLA-BW 1 SERO. EQUIV: 4
HLA-BW 2 SERO. EQUIV: 6
HLA-C 1: NORMAL
HLA-C 2: NORMAL
HLA-CW 1 SERO. EQUIV: 2
HLA-CW 2 SERO. EQUIV: 14
HLA-DPA1 1: NORMAL
HLA-DPA1 2: NORMAL
HLA-DPB1 1: NORMAL
HLA-DPB1 2: NORMAL
HLA-DQ 1 SERO. EQUIV: 2
HLA-DQ 2 SERO. EQUIV: 5
HLA-DQB1 1: NORMAL
HLA-DQB1 2: NORMAL
HLA-DRB1 1 SERO. EQUIV: 1
HLA-DRB1 1: NORMAL
HLA-DRB1 2 SERO. EQUIV: 17
HLA-DRB1 2: NORMAL
HLA-DRB3 1: NORMAL
HLA-DRB3 2: NORMAL
HLA-DRB345 1 SERO. EQUIV: 52
HLA-DRB345 2 SERO. EQUIV: NORMAL
HLA-DRB4 2: NORMAL
HLA-DRB5 1: NORMAL
HLA-DRB5 2: NORMAL
HLATY INTERPRETATION: NORMAL
SCRFL TESTING DATE: NORMAL
SERUM COLLECTION DT - XM AUTO: NORMAL
SERUM COLLECTION DT: NORMAL
SSALL INTERPRETATION: NORMAL
SSALL TESTING DATE: NORMAL
SSDPA TESTING DATE: NORMAL
SSDPB TESTING DATE: NORMAL
SSDQA TESTING DATE: NORMAL
SSDQB TESTING DATE: NORMAL
SSDRB TESTING DATE: NORMAL
SSOA TESTING DATE: NORMAL
SSOB TESTING DATE: NORMAL
SSOC TESTING DATE: NORMAL
SSODR TESTING DATE: NORMAL
T CELL RESULTS - XM AUTO: NEGATIVE
T MCS AVERAGE - XM AUTO: -13.1

## 2021-12-22 ENCOUNTER — HOSPITAL ENCOUNTER (OUTPATIENT)
Dept: PULMONOLOGY | Facility: CLINIC | Age: 56
Discharge: HOME | End: 2021-12-22
Payer: MEDICAID

## 2021-12-22 ENCOUNTER — OFFICE VISIT (OUTPATIENT)
Dept: TRANSPLANT | Facility: CLINIC | Age: 56
End: 2021-12-22
Attending: INTERNAL MEDICINE
Payer: MEDICAID

## 2021-12-22 ENCOUNTER — CLINICAL SUPPORT (OUTPATIENT)
Dept: INFECTIOUS DISEASES | Facility: CLINIC | Age: 56
End: 2021-12-22
Payer: MEDICAID

## 2021-12-22 VITALS
WEIGHT: 202.38 LBS | HEIGHT: 66 IN | HEIGHT: 66 IN | WEIGHT: 202 LBS | BODY MASS INDEX: 32.53 KG/M2 | OXYGEN SATURATION: 97 % | BODY MASS INDEX: 32.47 KG/M2 | SYSTOLIC BLOOD PRESSURE: 96 MMHG | HEART RATE: 66 BPM | DIASTOLIC BLOOD PRESSURE: 55 MMHG

## 2021-12-22 DIAGNOSIS — Z76.82 HEART TRANSPLANT CANDIDATE: ICD-10-CM

## 2021-12-22 DIAGNOSIS — I50.43 ACUTE ON CHRONIC COMBINED SYSTOLIC AND DIASTOLIC CONGESTIVE HEART FAILURE: ICD-10-CM

## 2021-12-22 DIAGNOSIS — I42.8 NICM (NONISCHEMIC CARDIOMYOPATHY): ICD-10-CM

## 2021-12-22 DIAGNOSIS — I50.42 CHRONIC COMBINED SYSTOLIC AND DIASTOLIC HEART FAILURE: Primary | ICD-10-CM

## 2021-12-22 DIAGNOSIS — G63 POLYNEUROPATHY ASSOCIATED WITH UNDERLYING DISEASE: ICD-10-CM

## 2021-12-22 DIAGNOSIS — I42.8 INFILTRATIVE CARDIOMYOPATHY: ICD-10-CM

## 2021-12-22 DIAGNOSIS — N28.9 ACUTE ON CHRONIC RENAL INSUFFICIENCY: ICD-10-CM

## 2021-12-22 DIAGNOSIS — N18.9 ACUTE ON CHRONIC RENAL INSUFFICIENCY: ICD-10-CM

## 2021-12-22 DIAGNOSIS — Z01.818 PRE-TRANSPLANT EVALUATION FOR HEART TRANSPLANT: ICD-10-CM

## 2021-12-22 PROCEDURE — 3078F PR MOST RECENT DIASTOLIC BLOOD PRESSURE < 80 MM HG: ICD-10-PCS | Mod: CPTII,TXP,, | Performed by: INTERNAL MEDICINE

## 2021-12-22 PROCEDURE — 1160F RVW MEDS BY RX/DR IN RCRD: CPT | Mod: CPTII,TXP,, | Performed by: INTERNAL MEDICINE

## 2021-12-22 PROCEDURE — 94727 GAS DIL/WSHOT DETER LNG VOL: CPT | Mod: PBBFAC,TXP | Performed by: INTERNAL MEDICINE

## 2021-12-22 PROCEDURE — 94010 BREATHING CAPACITY TEST: CPT | Mod: PBBFAC,TXP | Performed by: INTERNAL MEDICINE

## 2021-12-22 PROCEDURE — 94729 DIFFUSING CAPACITY: CPT | Mod: PBBFAC,TXP | Performed by: INTERNAL MEDICINE

## 2021-12-22 PROCEDURE — 3008F PR BODY MASS INDEX (BMI) DOCUMENTED: ICD-10-PCS | Mod: CPTII,TXP,, | Performed by: INTERNAL MEDICINE

## 2021-12-22 PROCEDURE — 4010F PR ACE/ARB THEARPY RXD/TAKEN: ICD-10-PCS | Mod: CPTII,TXP,, | Performed by: INTERNAL MEDICINE

## 2021-12-22 PROCEDURE — 94727 PR PULM FUNCTION TEST BY GAS: ICD-10-PCS | Mod: 26,S$PBB,TXP, | Performed by: INTERNAL MEDICINE

## 2021-12-22 PROCEDURE — 99999 PR PBB SHADOW E&M-EST. PATIENT-LVL IV: CPT | Mod: PBBFAC,TXP,, | Performed by: INTERNAL MEDICINE

## 2021-12-22 PROCEDURE — 3066F PR DOCUMENTATION OF TREATMENT FOR NEPHROPATHY: ICD-10-PCS | Mod: CPTII,TXP,, | Performed by: INTERNAL MEDICINE

## 2021-12-22 PROCEDURE — 1159F PR MEDICATION LIST DOCUMENTED IN MEDICAL RECORD: ICD-10-PCS | Mod: CPTII,TXP,, | Performed by: INTERNAL MEDICINE

## 2021-12-22 PROCEDURE — 99214 OFFICE O/P EST MOD 30 MIN: CPT | Mod: PBBFAC,TXP | Performed by: INTERNAL MEDICINE

## 2021-12-22 PROCEDURE — 3066F NEPHROPATHY DOC TX: CPT | Mod: CPTII,TXP,, | Performed by: INTERNAL MEDICINE

## 2021-12-22 PROCEDURE — 94727 GAS DIL/WSHOT DETER LNG VOL: CPT | Mod: 26,S$PBB,TXP, | Performed by: INTERNAL MEDICINE

## 2021-12-22 PROCEDURE — 1111F PR DISCHARGE MEDS RECONCILED W/ CURRENT OUTPATIENT MED LIST: ICD-10-PCS | Mod: CPTII,TXP,, | Performed by: INTERNAL MEDICINE

## 2021-12-22 PROCEDURE — 1111F DSCHRG MED/CURRENT MED MERGE: CPT | Mod: CPTII,TXP,, | Performed by: INTERNAL MEDICINE

## 2021-12-22 PROCEDURE — 94618 PULMONARY STRESS TESTING: ICD-10-PCS | Mod: 26,S$PBB,TXP, | Performed by: INTERNAL MEDICINE

## 2021-12-22 PROCEDURE — 99214 OFFICE O/P EST MOD 30 MIN: CPT | Mod: S$PBB,TXP,, | Performed by: INTERNAL MEDICINE

## 2021-12-22 PROCEDURE — 3074F SYST BP LT 130 MM HG: CPT | Mod: CPTII,TXP,, | Performed by: INTERNAL MEDICINE

## 2021-12-22 PROCEDURE — 99214 PR OFFICE/OUTPT VISIT, EST, LEVL IV, 30-39 MIN: ICD-10-PCS | Mod: S$PBB,TXP,, | Performed by: INTERNAL MEDICINE

## 2021-12-22 PROCEDURE — 3074F PR MOST RECENT SYSTOLIC BLOOD PRESSURE < 130 MM HG: ICD-10-PCS | Mod: CPTII,TXP,, | Performed by: INTERNAL MEDICINE

## 2021-12-22 PROCEDURE — 3078F DIAST BP <80 MM HG: CPT | Mod: CPTII,TXP,, | Performed by: INTERNAL MEDICINE

## 2021-12-22 PROCEDURE — 3044F PR MOST RECENT HEMOGLOBIN A1C LEVEL <7.0%: ICD-10-PCS | Mod: CPTII,TXP,, | Performed by: INTERNAL MEDICINE

## 2021-12-22 PROCEDURE — 94010 BREATHING CAPACITY TEST: CPT | Mod: 26,S$PBB,TXP, | Performed by: INTERNAL MEDICINE

## 2021-12-22 PROCEDURE — 94729 DIFFUSING CAPACITY: CPT | Mod: 26,S$PBB,TXP, | Performed by: INTERNAL MEDICINE

## 2021-12-22 PROCEDURE — 94618 PULMONARY STRESS TESTING: CPT | Mod: 26,S$PBB,TXP, | Performed by: INTERNAL MEDICINE

## 2021-12-22 PROCEDURE — 90471 IMMUNIZATION ADMIN: CPT | Mod: PBBFAC,TXP

## 2021-12-22 PROCEDURE — 1160F PR REVIEW ALL MEDS BY PRESCRIBER/CLIN PHARMACIST DOCUMENTED: ICD-10-PCS | Mod: CPTII,TXP,, | Performed by: INTERNAL MEDICINE

## 2021-12-22 PROCEDURE — 3044F HG A1C LEVEL LT 7.0%: CPT | Mod: CPTII,TXP,, | Performed by: INTERNAL MEDICINE

## 2021-12-22 PROCEDURE — 99999 PR PBB SHADOW E&M-EST. PATIENT-LVL IV: ICD-10-PCS | Mod: PBBFAC,TXP,, | Performed by: INTERNAL MEDICINE

## 2021-12-22 PROCEDURE — 94010 BREATHING CAPACITY TEST: ICD-10-PCS | Mod: 26,S$PBB,TXP, | Performed by: INTERNAL MEDICINE

## 2021-12-22 PROCEDURE — 94729 PR C02/MEMBANE DIFFUSE CAPACITY: ICD-10-PCS | Mod: 26,S$PBB,TXP, | Performed by: INTERNAL MEDICINE

## 2021-12-22 PROCEDURE — 90472 IMMUNIZATION ADMIN EACH ADD: CPT | Mod: PBBFAC,TXP

## 2021-12-22 PROCEDURE — 4010F ACE/ARB THERAPY RXD/TAKEN: CPT | Mod: CPTII,TXP,, | Performed by: INTERNAL MEDICINE

## 2021-12-22 PROCEDURE — 94618 PULMONARY STRESS TESTING: CPT | Mod: PBBFAC,TXP | Performed by: INTERNAL MEDICINE

## 2021-12-22 PROCEDURE — 3008F BODY MASS INDEX DOCD: CPT | Mod: CPTII,TXP,, | Performed by: INTERNAL MEDICINE

## 2021-12-22 PROCEDURE — 1159F MED LIST DOCD IN RCRD: CPT | Mod: CPTII,TXP,, | Performed by: INTERNAL MEDICINE

## 2021-12-22 RX ORDER — FUROSEMIDE 80 MG/1
80 TABLET ORAL DAILY
Qty: 60 TABLET | Refills: 11 | Status: ON HOLD
Start: 2021-12-22 | End: 2022-11-03 | Stop reason: SDUPTHER

## 2021-12-22 RX ORDER — METOPROLOL SUCCINATE 200 MG/1
200 TABLET, EXTENDED RELEASE ORAL 2 TIMES DAILY
Qty: 30 TABLET | Refills: 5 | Status: ON HOLD | OUTPATIENT
Start: 2021-12-22 | End: 2022-05-06 | Stop reason: HOSPADM

## 2021-12-28 ENCOUNTER — OFFICE VISIT (OUTPATIENT)
Dept: FAMILY MEDICINE | Facility: CLINIC | Age: 56
End: 2021-12-28
Payer: MEDICAID

## 2021-12-28 VITALS
WEIGHT: 201.25 LBS | SYSTOLIC BLOOD PRESSURE: 122 MMHG | HEIGHT: 66 IN | RESPIRATION RATE: 18 BRPM | BODY MASS INDEX: 32.34 KG/M2 | HEART RATE: 70 BPM | OXYGEN SATURATION: 95 % | DIASTOLIC BLOOD PRESSURE: 84 MMHG

## 2021-12-28 DIAGNOSIS — I48.0 PAROXYSMAL ATRIAL FIBRILLATION: ICD-10-CM

## 2021-12-28 DIAGNOSIS — K59.00 CONSTIPATION, UNSPECIFIED CONSTIPATION TYPE: ICD-10-CM

## 2021-12-28 DIAGNOSIS — M77.12 LEFT LATERAL EPICONDYLITIS: ICD-10-CM

## 2021-12-28 DIAGNOSIS — M67.40 GANGLION CYST: ICD-10-CM

## 2021-12-28 DIAGNOSIS — F41.9 ANXIETY AND DEPRESSION: ICD-10-CM

## 2021-12-28 DIAGNOSIS — F32.A ANXIETY AND DEPRESSION: ICD-10-CM

## 2021-12-28 DIAGNOSIS — N18.32 STAGE 3B CHRONIC KIDNEY DISEASE: ICD-10-CM

## 2021-12-28 DIAGNOSIS — G25.81 RLS (RESTLESS LEGS SYNDROME): ICD-10-CM

## 2021-12-28 DIAGNOSIS — K64.9 HEMORRHOIDS, UNSPECIFIED HEMORRHOID TYPE: Primary | ICD-10-CM

## 2021-12-28 DIAGNOSIS — I42.8 NICM (NONISCHEMIC CARDIOMYOPATHY): ICD-10-CM

## 2021-12-28 DIAGNOSIS — K21.9 GASTROESOPHAGEAL REFLUX DISEASE, UNSPECIFIED WHETHER ESOPHAGITIS PRESENT: ICD-10-CM

## 2021-12-28 DIAGNOSIS — M25.532 LEFT WRIST PAIN: ICD-10-CM

## 2021-12-28 PROCEDURE — 1160F PR REVIEW ALL MEDS BY PRESCRIBER/CLIN PHARMACIST DOCUMENTED: ICD-10-PCS | Mod: CPTII,,, | Performed by: STUDENT IN AN ORGANIZED HEALTH CARE EDUCATION/TRAINING PROGRAM

## 2021-12-28 PROCEDURE — 3008F PR BODY MASS INDEX (BMI) DOCUMENTED: ICD-10-PCS | Mod: CPTII,,, | Performed by: STUDENT IN AN ORGANIZED HEALTH CARE EDUCATION/TRAINING PROGRAM

## 2021-12-28 PROCEDURE — 1111F DSCHRG MED/CURRENT MED MERGE: CPT | Mod: CPTII,,, | Performed by: STUDENT IN AN ORGANIZED HEALTH CARE EDUCATION/TRAINING PROGRAM

## 2021-12-28 PROCEDURE — 3066F NEPHROPATHY DOC TX: CPT | Mod: CPTII,,, | Performed by: STUDENT IN AN ORGANIZED HEALTH CARE EDUCATION/TRAINING PROGRAM

## 2021-12-28 PROCEDURE — 3008F BODY MASS INDEX DOCD: CPT | Mod: CPTII,,, | Performed by: STUDENT IN AN ORGANIZED HEALTH CARE EDUCATION/TRAINING PROGRAM

## 2021-12-28 PROCEDURE — 3079F PR MOST RECENT DIASTOLIC BLOOD PRESSURE 80-89 MM HG: ICD-10-PCS | Mod: CPTII,,, | Performed by: STUDENT IN AN ORGANIZED HEALTH CARE EDUCATION/TRAINING PROGRAM

## 2021-12-28 PROCEDURE — 3066F PR DOCUMENTATION OF TREATMENT FOR NEPHROPATHY: ICD-10-PCS | Mod: CPTII,,, | Performed by: STUDENT IN AN ORGANIZED HEALTH CARE EDUCATION/TRAINING PROGRAM

## 2021-12-28 PROCEDURE — 4010F PR ACE/ARB THEARPY RXD/TAKEN: ICD-10-PCS | Mod: CPTII,,, | Performed by: STUDENT IN AN ORGANIZED HEALTH CARE EDUCATION/TRAINING PROGRAM

## 2021-12-28 PROCEDURE — 99215 OFFICE O/P EST HI 40 MIN: CPT | Mod: PBBFAC | Performed by: STUDENT IN AN ORGANIZED HEALTH CARE EDUCATION/TRAINING PROGRAM

## 2021-12-28 PROCEDURE — 99999 PR PBB SHADOW E&M-EST. PATIENT-LVL V: CPT | Mod: PBBFAC,,, | Performed by: STUDENT IN AN ORGANIZED HEALTH CARE EDUCATION/TRAINING PROGRAM

## 2021-12-28 PROCEDURE — 1160F RVW MEDS BY RX/DR IN RCRD: CPT | Mod: CPTII,,, | Performed by: STUDENT IN AN ORGANIZED HEALTH CARE EDUCATION/TRAINING PROGRAM

## 2021-12-28 PROCEDURE — 99214 PR OFFICE/OUTPT VISIT, EST, LEVL IV, 30-39 MIN: ICD-10-PCS | Mod: S$PBB,,, | Performed by: STUDENT IN AN ORGANIZED HEALTH CARE EDUCATION/TRAINING PROGRAM

## 2021-12-28 PROCEDURE — 3074F SYST BP LT 130 MM HG: CPT | Mod: CPTII,,, | Performed by: STUDENT IN AN ORGANIZED HEALTH CARE EDUCATION/TRAINING PROGRAM

## 2021-12-28 PROCEDURE — 3074F PR MOST RECENT SYSTOLIC BLOOD PRESSURE < 130 MM HG: ICD-10-PCS | Mod: CPTII,,, | Performed by: STUDENT IN AN ORGANIZED HEALTH CARE EDUCATION/TRAINING PROGRAM

## 2021-12-28 PROCEDURE — 3044F PR MOST RECENT HEMOGLOBIN A1C LEVEL <7.0%: ICD-10-PCS | Mod: CPTII,,, | Performed by: STUDENT IN AN ORGANIZED HEALTH CARE EDUCATION/TRAINING PROGRAM

## 2021-12-28 PROCEDURE — 99999 PR PBB SHADOW E&M-EST. PATIENT-LVL V: ICD-10-PCS | Mod: PBBFAC,,, | Performed by: STUDENT IN AN ORGANIZED HEALTH CARE EDUCATION/TRAINING PROGRAM

## 2021-12-28 PROCEDURE — 3044F HG A1C LEVEL LT 7.0%: CPT | Mod: CPTII,,, | Performed by: STUDENT IN AN ORGANIZED HEALTH CARE EDUCATION/TRAINING PROGRAM

## 2021-12-28 PROCEDURE — 4010F ACE/ARB THERAPY RXD/TAKEN: CPT | Mod: CPTII,,, | Performed by: STUDENT IN AN ORGANIZED HEALTH CARE EDUCATION/TRAINING PROGRAM

## 2021-12-28 PROCEDURE — 1159F MED LIST DOCD IN RCRD: CPT | Mod: CPTII,,, | Performed by: STUDENT IN AN ORGANIZED HEALTH CARE EDUCATION/TRAINING PROGRAM

## 2021-12-28 PROCEDURE — 1111F PR DISCHARGE MEDS RECONCILED W/ CURRENT OUTPATIENT MED LIST: ICD-10-PCS | Mod: CPTII,,, | Performed by: STUDENT IN AN ORGANIZED HEALTH CARE EDUCATION/TRAINING PROGRAM

## 2021-12-28 PROCEDURE — 3079F DIAST BP 80-89 MM HG: CPT | Mod: CPTII,,, | Performed by: STUDENT IN AN ORGANIZED HEALTH CARE EDUCATION/TRAINING PROGRAM

## 2021-12-28 PROCEDURE — 1159F PR MEDICATION LIST DOCUMENTED IN MEDICAL RECORD: ICD-10-PCS | Mod: CPTII,,, | Performed by: STUDENT IN AN ORGANIZED HEALTH CARE EDUCATION/TRAINING PROGRAM

## 2021-12-28 PROCEDURE — 99214 OFFICE O/P EST MOD 30 MIN: CPT | Mod: S$PBB,,, | Performed by: STUDENT IN AN ORGANIZED HEALTH CARE EDUCATION/TRAINING PROGRAM

## 2021-12-28 RX ORDER — DICLOFENAC SODIUM 10 MG/G
2 GEL TOPICAL 3 TIMES DAILY PRN
Qty: 450 G | Refills: 0 | Status: SHIPPED | OUTPATIENT
Start: 2021-12-28 | End: 2022-01-04 | Stop reason: SDUPTHER

## 2021-12-28 RX ORDER — ROPINIROLE 4 MG/1
4 TABLET, FILM COATED ORAL DAILY
Qty: 30 TABLET | Refills: 5 | Status: SHIPPED | OUTPATIENT
Start: 2021-12-28 | End: 2022-01-04 | Stop reason: SDUPTHER

## 2021-12-28 RX ORDER — POLYETHYLENE GLYCOL 3350 17 G/17G
17 POWDER, FOR SOLUTION ORAL 3 TIMES DAILY PRN
Qty: 100 EACH | Refills: 11 | Status: SHIPPED | OUTPATIENT
Start: 2021-12-28 | End: 2022-01-04 | Stop reason: SDUPTHER

## 2021-12-28 RX ORDER — PANTOPRAZOLE SODIUM 40 MG/1
TABLET, DELAYED RELEASE ORAL
Qty: 30 TABLET | Refills: 11 | Status: SHIPPED | OUTPATIENT
Start: 2021-12-28 | End: 2022-01-04 | Stop reason: SDUPTHER

## 2021-12-28 RX ORDER — HYDROCORTISONE 25 MG/G
CREAM TOPICAL 2 TIMES DAILY PRN
Qty: 28 G | Refills: 0 | Status: SHIPPED | OUTPATIENT
Start: 2021-12-28 | End: 2022-01-04 | Stop reason: SDUPTHER

## 2021-12-29 ENCOUNTER — TELEPHONE (OUTPATIENT)
Dept: TRANSPLANT | Facility: CLINIC | Age: 56
End: 2021-12-29
Payer: MEDICAID

## 2021-12-29 ENCOUNTER — TELEPHONE (OUTPATIENT)
Dept: ENDOSCOPY | Facility: HOSPITAL | Age: 56
End: 2021-12-29
Payer: MEDICAID

## 2021-12-29 ENCOUNTER — TELEPHONE (OUTPATIENT)
Dept: GASTROENTEROLOGY | Facility: CLINIC | Age: 56
End: 2021-12-29
Payer: MEDICAID

## 2021-12-29 ENCOUNTER — PATIENT OUTREACH (OUTPATIENT)
Dept: ADMINISTRATIVE | Facility: OTHER | Age: 56
End: 2021-12-29
Payer: MEDICAID

## 2021-12-29 ENCOUNTER — TELEPHONE (OUTPATIENT)
Dept: SURGERY | Facility: CLINIC | Age: 56
End: 2021-12-29
Payer: MEDICAID

## 2021-12-30 ENCOUNTER — TELEPHONE (OUTPATIENT)
Dept: ENDOSCOPY | Facility: HOSPITAL | Age: 56
End: 2021-12-30
Payer: MEDICAID

## 2021-12-30 DIAGNOSIS — Z01.818 PRE-OP TESTING: ICD-10-CM

## 2021-12-31 ENCOUNTER — HOSPITAL ENCOUNTER (EMERGENCY)
Facility: HOSPITAL | Age: 56
Discharge: HOME | End: 2021-12-31
Attending: SURGERY
Payer: MEDICAID

## 2021-12-31 VITALS
TEMPERATURE: 98 F | RESPIRATION RATE: 18 BRPM | BODY MASS INDEX: 32.79 KG/M2 | HEART RATE: 78 BPM | DIASTOLIC BLOOD PRESSURE: 89 MMHG | OXYGEN SATURATION: 99 % | WEIGHT: 203.13 LBS | SYSTOLIC BLOOD PRESSURE: 139 MMHG
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
## 2021-12-31 DIAGNOSIS — M25.512 ACUTE PAIN OF LEFT SHOULDER: ICD-10-CM

## 2021-12-31 PROCEDURE — 99283 EMERGENCY DEPT VISIT LOW MDM: CPT | Mod: 25,NTX

## 2021-12-31 RX ORDER — ACETAMINOPHEN 500 MG
1000 TABLET ORAL
Status: DISCONTINUED | OUTPATIENT
Start: 2021-12-31 | End: 2021-12-31 | Stop reason: HOSPADM
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
## 2022-01-03 ENCOUNTER — TELEPHONE (OUTPATIENT)
Dept: SURGERY | Facility: CLINIC | Age: 57
End: 2022-01-03
Payer: MEDICAID

## 2022-01-04 ENCOUNTER — OFFICE VISIT (OUTPATIENT)
Dept: SURGERY | Facility: CLINIC | Age: 57
End: 2022-01-04
Payer: MEDICAID

## 2022-01-04 VITALS
HEART RATE: 97 BPM | DIASTOLIC BLOOD PRESSURE: 73 MMHG | BODY MASS INDEX: 32.62 KG/M2 | SYSTOLIC BLOOD PRESSURE: 118 MMHG | HEIGHT: 66 IN | WEIGHT: 203 LBS

## 2022-01-04 DIAGNOSIS — G25.81 RLS (RESTLESS LEGS SYNDROME): ICD-10-CM

## 2022-01-04 DIAGNOSIS — K21.9 GASTROESOPHAGEAL REFLUX DISEASE, UNSPECIFIED WHETHER ESOPHAGITIS PRESENT: ICD-10-CM

## 2022-01-04 DIAGNOSIS — M67.40 GANGLION CYST: ICD-10-CM

## 2022-01-04 DIAGNOSIS — M77.12 LEFT LATERAL EPICONDYLITIS: ICD-10-CM

## 2022-01-04 DIAGNOSIS — K59.00 CONSTIPATION, UNSPECIFIED CONSTIPATION TYPE: ICD-10-CM

## 2022-01-04 DIAGNOSIS — M25.532 LEFT WRIST PAIN: ICD-10-CM

## 2022-01-04 DIAGNOSIS — K64.9 HEMORRHOIDS, UNSPECIFIED HEMORRHOID TYPE: ICD-10-CM

## 2022-01-04 DIAGNOSIS — K60.2 ANAL FISSURE: Primary | ICD-10-CM

## 2022-01-04 PROCEDURE — 3074F PR MOST RECENT SYSTOLIC BLOOD PRESSURE < 130 MM HG: ICD-10-PCS | Mod: CPTII,NTX,, | Performed by: COLON & RECTAL SURGERY

## 2022-01-04 PROCEDURE — 3008F BODY MASS INDEX DOCD: CPT | Mod: CPTII,NTX,, | Performed by: COLON & RECTAL SURGERY

## 2022-01-04 PROCEDURE — 1160F PR REVIEW ALL MEDS BY PRESCRIBER/CLIN PHARMACIST DOCUMENTED: ICD-10-PCS | Mod: CPTII,NTX,, | Performed by: COLON & RECTAL SURGERY

## 2022-01-04 PROCEDURE — 99999 PR PBB SHADOW E&M-EST. PATIENT-LVL V: CPT | Mod: PBBFAC,TXP,, | Performed by: COLON & RECTAL SURGERY

## 2022-01-04 PROCEDURE — 99204 PR OFFICE/OUTPT VISIT, NEW, LEVL IV, 45-59 MIN: ICD-10-PCS | Mod: S$PBB,NTX,, | Performed by: COLON & RECTAL SURGERY

## 2022-01-04 PROCEDURE — 1111F DSCHRG MED/CURRENT MED MERGE: CPT | Mod: CPTII,NTX,, | Performed by: COLON & RECTAL SURGERY

## 2022-01-04 PROCEDURE — 99215 OFFICE O/P EST HI 40 MIN: CPT | Mod: PBBFAC,NTX | Performed by: COLON & RECTAL SURGERY

## 2022-01-04 PROCEDURE — 99999 PR PBB SHADOW E&M-EST. PATIENT-LVL V: ICD-10-PCS | Mod: PBBFAC,TXP,, | Performed by: COLON & RECTAL SURGERY

## 2022-01-04 PROCEDURE — 1159F MED LIST DOCD IN RCRD: CPT | Mod: CPTII,NTX,, | Performed by: COLON & RECTAL SURGERY

## 2022-01-04 PROCEDURE — 3078F PR MOST RECENT DIASTOLIC BLOOD PRESSURE < 80 MM HG: ICD-10-PCS | Mod: CPTII,NTX,, | Performed by: COLON & RECTAL SURGERY

## 2022-01-04 PROCEDURE — 1159F PR MEDICATION LIST DOCUMENTED IN MEDICAL RECORD: ICD-10-PCS | Mod: CPTII,NTX,, | Performed by: COLON & RECTAL SURGERY

## 2022-01-04 PROCEDURE — 3074F SYST BP LT 130 MM HG: CPT | Mod: CPTII,NTX,, | Performed by: COLON & RECTAL SURGERY

## 2022-01-04 PROCEDURE — 3008F PR BODY MASS INDEX (BMI) DOCUMENTED: ICD-10-PCS | Mod: CPTII,NTX,, | Performed by: COLON & RECTAL SURGERY

## 2022-01-04 PROCEDURE — 99204 OFFICE O/P NEW MOD 45 MIN: CPT | Mod: S$PBB,NTX,, | Performed by: COLON & RECTAL SURGERY

## 2022-01-04 PROCEDURE — 3078F DIAST BP <80 MM HG: CPT | Mod: CPTII,NTX,, | Performed by: COLON & RECTAL SURGERY

## 2022-01-04 PROCEDURE — 1160F RVW MEDS BY RX/DR IN RCRD: CPT | Mod: CPTII,NTX,, | Performed by: COLON & RECTAL SURGERY

## 2022-01-04 PROCEDURE — 1111F PR DISCHARGE MEDS RECONCILED W/ CURRENT OUTPATIENT MED LIST: ICD-10-PCS | Mod: CPTII,NTX,, | Performed by: COLON & RECTAL SURGERY

## 2022-01-04 RX ORDER — DICLOFENAC SODIUM 10 MG/G
2 GEL TOPICAL 3 TIMES DAILY PRN
Qty: 450 G | Refills: 0 | Status: SHIPPED | OUTPATIENT
Start: 2022-01-04 | End: 2022-01-18

## 2022-01-04 RX ORDER — PANTOPRAZOLE SODIUM 40 MG/1
TABLET, DELAYED RELEASE ORAL
Qty: 30 TABLET | Refills: 11 | Status: SHIPPED | OUTPATIENT
Start: 2022-01-04 | End: 2022-12-30 | Stop reason: SDUPTHER

## 2022-01-04 RX ORDER — ROPINIROLE 4 MG/1
4 TABLET, FILM COATED ORAL DAILY
Qty: 30 TABLET | Refills: 5 | Status: SHIPPED | OUTPATIENT
Start: 2022-01-04 | End: 2023-01-05

## 2022-01-04 RX ORDER — POLYETHYLENE GLYCOL 3350 17 G/17G
17 POWDER, FOR SOLUTION ORAL 3 TIMES DAILY PRN
Qty: 100 EACH | Refills: 11 | Status: SHIPPED | OUTPATIENT
Start: 2022-01-04 | End: 2023-01-05

## 2022-01-04 RX ORDER — HYDROCORTISONE 25 MG/G
CREAM TOPICAL 2 TIMES DAILY PRN
Qty: 28 G | Refills: 0 | Status: SHIPPED | OUTPATIENT
Start: 2022-01-04 | End: 2022-01-18

## 2022-01-04 NOTE — PROGRESS NOTES
"CRS Office Visit History and Physical    Referring Md:   GEORGE Ramirez Md  [address] [place] [address]    SUBJECTIVE:     Chief Complaint:  Bleeding hemorrhoids    History of Present Illness:  The patient is a new patient to this practice.   Course is as follows:  Patient is a 56 y.o. female presents with bleeding hemorrhoids  Her main complaint is bleeding and severe pain over the past month.  Denies any prolapse of tissue or perianal mass.  Historically, she had 3-4 bowel movements per day following her cholecystectomy.  Over the last several weeks, she has had new onset constipation having bowel movements once every 3 days.  She has remote history of a hemorrhoidectomy multiple years ago by Dr. Rothman at [facility].  For constipation, she has tried Linzess and Colace with no significant improvement.    Functionally, her activity is limited.  She has underlying COPD and is on home oxygen since August of 2020 following COVID.  She has congestive heart failure and is on Eliquis.  She is being evaluated for an LVAD.    Last Colonoscopy:  None    Review of patient's allergies indicates:   Allergen Reactions    Penicillins Hives    Iodinated contrast media Nausea And Vomiting    Oxycodone-acetaminophen Other (See Comments)     Nausea, Dizziness, Anxiety.  "I don't like how it makes me feel."   Given Hydromorphone 0.5mg IVP  Without problems.  Other reaction(s): Other (See Comments)    Diovan hct [valsartan-hydrochlorothiazide] Other (See Comments)     Causes coughing    Irinotecan      Pt has homozygosity for the TA7 promoter variant that places this individual at significantly increased risk for   severe neutropenia (grade 4) when treated with the standard dose of irinotecan (risk approximately 50%).   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 " Metabolism of other drugs not listed here may also be impacted by UGT1A1 enzyme activity.       Tramadol Nausea And Vomiting     Other reaction(s): Other (See Comments)       Past Medical History:   Diagnosis Date    Anemia     Anticoagulant long-term use     Arthritis     Atrial fibrillation     Congestive heart failure     COPD (chronic obstructive pulmonary disease)     Deep vein thrombosis     elevated bilirubin d/t Gilbert's syndrome     confirmed by [place] genetic testing, evaluated by hepatology    Encounter for blood transfusion     GERD (gastroesophageal reflux disease)     Hypertension     Pheochromocytoma, malignant     Right kidney mass     Sleep apnea     Thalassemia trait, alpha     Thyroid disease     Type 2 diabetes mellitus with hyperglycemia, without long-term current use of insulin 8/13/2020     Past Surgical History:   Procedure Laterality Date    APPENDECTOMY      BONE MARROW BIOPSY      CARDIAC DEFIBRILLATOR PLACEMENT Left 12/2016    CARDIAC ELECTROPHYSIOLOGY MAPPING AND ABLATION      EYE SURGERY      due to running tears    FRACTURE SURGERY Left     hand 5th digit    HYSTERECTOMY      KNEE SURGERY Left 2016    hematoma    LIVER BIOPSY  10/24/2018    Minimal steatosis, predominantly macrovesicular, 1%, Minimal nonspecific portal inflammation, no fibrosis. No findings on biopsy to explain elevated bilirubin levels. Could be d/t Gilbert's =?- hemolysis    RIGHT HEART CATHETERIZATION Right 12/7/2021    Procedure: INSERTION, CATHETER, RIGHT HEART;  Surgeon: Irving Cardenas MD;  Location: [facility] CATH LAB;  Service: Cardiology;  Laterality: Right;    TRANSJUGULAR BIOPSY OF LIVER N/A 10/24/2018    Procedure: BIOPSY, LIVER, TRANSJUGULAR APPROACH;  Surgeon: Carmen Diagnostic Provider;  Location: [facility] OR [address] [place] [address];  Service: Radiology;  Laterality: N/A;     Family History   Problem Relation Age of Onset    Cancer Mother         pancreatic CA early 50's    Heart disease Father         "  MI in late 50's    Hypertension Father     Heart attack Father     Heart disease Sister     Heart disease Brother     Cirrhosis Brother         alcoholic    Heart disease Sister     Heart disease Brother     Hypertension Brother     Diabetes Brother      Social History     Tobacco Use    Smoking status: Never Smoker    Smokeless tobacco: Never Used   Substance Use Topics    Alcohol use: Yes     Comment: up to 1 yr ago drank 2-3 drinks on occasion but sporadic    Drug use: No        Review of Systems:  Review of Systems   Constitutional: Positive for malaise/fatigue and weight loss. Negative for chills, diaphoresis and fever.   HENT: Negative for congestion.    Respiratory: Positive for shortness of breath.    Cardiovascular: Positive for leg swelling. Negative for chest pain.   Gastrointestinal: Positive for blood in stool and constipation. Negative for abdominal pain, nausea and vomiting.   Genitourinary: Negative for dysuria.   Musculoskeletal: Negative for back pain and myalgias.   Skin: Negative for rash.   Neurological: Negative for dizziness and weakness.   Endo/Heme/Allergies: Bruises/bleeds easily.   Psychiatric/Behavioral: Negative for depression.       OBJECTIVE:     Vital Signs (Most Recent)  /73 (BP Location: Left arm, Patient Position: Sitting, BP Method: Large (Automatic))   Pulse 97   Ht 5' 6" (1.676 m)   Wt 92.1 kg (203 lb)   LMP  (LMP Unknown)   BMI 32.77 kg/m²     Physical Exam:  General: Black or  female in no distress   Neuro: alert and oriented x 4.  Moves all extremities.     HEENT: no icterus.  Trachea midline  Respiratory: respirations are even and unlabored  Cardiac: regular rate  Abdomen:  Protuberant, soft, no masses  Extremities: Warm dry and intact  Skin: no rashes  Anorectal: posterior midline anal fissure with hypertonic anal sphincter and tenderness.    Labs:  H&H 11 and 37.  Creatinine 2.5.    Imaging: CT abdomen pelvis from 2021 " personally reviewed demonstrates normal appearing colon with no significant stool burden.  Cardiomegaly with small pericardial effusion noted.      ASSESSMENT/PLAN:     Hafsa was seen today for hemorrhoids.    Diagnoses and all orders for this visit:    Anal fissure  -     diltiazem HCl (DILTIAZEM 2% CREAM); Apply topically 3 (three) times daily. Apply topically to anal area.  -     linaCLOtide (LINZESS) 145 mcg Cap capsule; Take 1 capsule (145 mcg total) by mouth before breakfast.        56 y.o. female with change in her bowel habits with new onset constipation resulting in an anal fissure.  We discussed how anal fissures for from either mechanical disruption of the anoderm from passage of large or hard bowel movements or poor vascularity of the midline of the anal canal.    Fissures have approximately 50-60% healing rate from topical diltiazem or nefedipine.  For those that do not respond to dietary modification, behavioral changes, and topical treatment, there are two options for treatment: botox injection or lateral internal sphincterotomy.  We discussed that botox is ~70% effective at healing fissures and has the risks of temporary incontinence to flatus.  Lateral internal sphincterotomy has a 95% effective rate but is more invasive and involves division of part of the internal sphincter and increases the potential for fecal incontinence.  Fissures are often associated with hypertonia of the internal anal sphincter.  Limited sphincterotomy will release the tension on the internal anal sphincter but the remaining sphincter pressure will still often be higher than average pressure.     Will plan to proceed with topical diltiazem cream.  If no improvement, would then plan for Botox in effort to limit any time under general anesthesia given her multiple medical comorbidities.    To assist with her underlying constipation, Linzess was given.    GEORGE Ramirez MD, FACS, FASCRS  Staff Surgeon  Colon & Rectal  Surgery

## 2022-01-04 NOTE — TELEPHONE ENCOUNTER
No new care gaps identified.  Powered by [employer] by [employer]. Reference number: [id number].   1/04/2022 8:25:46 AM CST

## 2022-01-04 NOTE — TELEPHONE ENCOUNTER
----- Message from Lourdes Oquendo sent at 1/3/2022  4:27 PM CST -----  Contact: [phone]  Pt called asking about her prescriptions from her visit on the 28th.  PAR [facility].. Pt asked if provider could send them to [facility] instead.     diclofenac sodium (VOLTAREN) 1 % Gel  hydrocortisone 2.5 % cream  polyethylene glycol (GLYCOLAX) 17 gram PwPk  pantoprazole (PROTONIX) 40 MG tablet  rOPINIRole (REQUIP) 4 MG tablet    Pt also asked about some lidocaine patches (the ones that stick to the skin)    Phone:  [phone]

## 2022-01-04 NOTE — TELEPHONE ENCOUNTER
Pt is requesting for medication to be sent to a different pharmacy. Pt is also requesting lidocaine patches.

## 2022-01-07 ENCOUNTER — HOSPITAL ENCOUNTER (OUTPATIENT)
Dept: RADIOLOGY | Facility: HOSPITAL | Age: 57
Discharge: HOME | End: 2022-01-07
Attending: INTERNAL MEDICINE
Payer: MEDICAID

## 2022-01-07 ENCOUNTER — CLINICAL SUPPORT (OUTPATIENT)
Dept: INFECTIOUS DISEASES | Facility: CLINIC | Age: 57
End: 2022-01-07
Payer: MEDICAID

## 2022-01-07 VITALS — HEIGHT: 66 IN | BODY MASS INDEX: 32.62 KG/M2 | WEIGHT: 203 LBS

## 2022-01-07 DIAGNOSIS — Z01.818 PRE-TRANSPLANT EVALUATION FOR HEART TRANSPLANT: ICD-10-CM

## 2022-01-07 DIAGNOSIS — Z76.82 HEART TRANSPLANT CANDIDATE: ICD-10-CM

## 2022-01-07 DIAGNOSIS — I50.42 CHRONIC COMBINED SYSTOLIC AND DIASTOLIC HEART FAILURE: ICD-10-CM

## 2022-01-07 LAB
C IMMITIS AB TITR SER CF: NEGATIVE {TITER}
C IMMITIS IGG SER QL: NEGATIVE
C IMMITIS IGM SER QL: NEGATIVE

## 2022-01-07 PROCEDURE — 77067 SCR MAMMO BI INCL CAD: CPT | Mod: 26,TXP,, | Performed by: RADIOLOGY

## 2022-01-07 PROCEDURE — 90632 HEPA VACCINE ADULT IM: CPT | Mod: PBBFAC,TXP

## 2022-01-07 PROCEDURE — 77067 SCR MAMMO BI INCL CAD: CPT | Mod: TC,TXP

## 2022-01-07 PROCEDURE — 77063 BREAST TOMOSYNTHESIS BI: CPT | Mod: TC,TXP

## 2022-01-07 PROCEDURE — 77063 BREAST TOMOSYNTHESIS BI: CPT | Mod: 26,TXP,, | Performed by: RADIOLOGY

## 2022-01-07 PROCEDURE — 90471 IMMUNIZATION ADMIN: CPT | Mod: PBBFAC,TXP

## 2022-01-07 PROCEDURE — 77063 MAMMO DIGITAL SCREENING BILAT WITH TOMO: ICD-10-PCS | Mod: 26,TXP,, | Performed by: RADIOLOGY

## 2022-01-07 PROCEDURE — 90472 IMMUNIZATION ADMIN EACH ADD: CPT | Mod: PBBFAC,TXP

## 2022-01-07 PROCEDURE — 77067 MAMMO DIGITAL SCREENING BILAT WITH TOMO: ICD-10-PCS | Mod: 26,TXP,, | Performed by: RADIOLOGY

## 2022-01-07 NOTE — TELEPHONE ENCOUNTER
No new care gaps identified.  Powered by [employer] by [employer]. Reference number: [id number].   1/07/2022 3:22:41 PM CST

## 2022-01-07 NOTE — PROGRESS NOTES
Patient received Hep A #1 in the left deltoid and Hep B #1 in right deltoid. Pt tolerated well. Pt asked to wait in the clinic 15 minutes after injection in the event of an allergic reaction. Pt verbalized understanding. Pt left in NAD.

## 2022-01-07 NOTE — TELEPHONE ENCOUNTER
----- Message from Kd Love sent at 2022 12:50 PM CST -----  Contact: self  Hafsa Hawley  MRN: [id number]  : 1965  PCP: Cristobal Ann  Home Phone      [phone]  Work Phone      Not on file.  Mobile          [phone]  Home Phone      [phone]      MESSAGE:   Pt requesting refill or new Rx.   Is this a refill or new RX:  refill  RX name and strength: blood sugar diagnostic (ACCU-CHEK GUIDE TEST STRIPS MISC)  Lancets  Lidocaine patch/ ones that acutally stick  Last office visit: 2021  Is this a 30-day or 90-day RX:  n/a  [facility]  Comments:      Phone:  [phone]

## 2022-01-07 NOTE — TELEPHONE ENCOUNTER
I spoke with pt that did schedule yearly mammogram appt. At [facility]. Pt is aware she has a morning appt and her mammogram with be that afternoon. Pt verbalized understanding of appt.   Pt is also requesting lidocaine patches that stick well. (not showing this on current med list)     LOV: 12/28/2021

## 2022-01-11 ENCOUNTER — TELEPHONE (OUTPATIENT)
Dept: TRANSPLANT | Facility: CLINIC | Age: 57
End: 2022-01-11
Payer: MEDICAID

## 2022-01-11 ENCOUNTER — EPISODE CHANGES (OUTPATIENT)
Dept: TRANSPLANT | Facility: CLINIC | Age: 57
End: 2022-01-11

## 2022-01-11 DIAGNOSIS — Z76.82 HEART TRANSPLANT CANDIDATE: Primary | ICD-10-CM

## 2022-01-11 NOTE — TELEPHONE ENCOUNTER
Reviewed lab results and sent to MD for review.  Pt has been seen for anal fissure, and has scheduled c scope in March/  Mammogram has been completed.  I called pt to discuss plan of care , specifically pulm consult.  Left message with my contact number.

## 2022-01-12 ENCOUNTER — TELEPHONE (OUTPATIENT)
Dept: TRANSPLANT | Facility: CLINIC | Age: 57
End: 2022-01-12
Payer: MEDICAID

## 2022-01-12 NOTE — TELEPHONE ENCOUNTER
I spoke with Ms Chandan this morning, she is able to see Pulm next week.  We discussed that Dr Orozco will review her labs from last week and determine plan form there.  As she is currently healing from an anal fissure, her c scope is on hold until March.  Will determine plan of care going forward. She says she is ready to complete her evaluation.  I let her know that it is good she is making her health a priority and to please call with questions or concerns. Offered emotional support.

## 2022-01-14 ENCOUNTER — PATIENT MESSAGE (OUTPATIENT)
Dept: TRANSPLANT | Facility: CLINIC | Age: 57
End: 2022-01-14
Payer: MEDICAID

## 2022-01-18 ENCOUNTER — TELEPHONE (OUTPATIENT)
Dept: TRANSPLANT | Facility: CLINIC | Age: 57
End: 2022-01-18
Payer: MEDICAID

## 2022-01-18 RX ORDER — INSULIN PUMP SYRINGE, 3 ML
EACH MISCELLANEOUS
Qty: 1 EACH | Refills: 0 | Status: SHIPPED | OUTPATIENT
Start: 2022-01-18 | End: 2022-03-16 | Stop reason: SDUPTHER

## 2022-01-18 RX ORDER — LANCETS
1 EACH MISCELLANEOUS 3 TIMES DAILY
Qty: 100 EACH | Refills: 11 | Status: SHIPPED | OUTPATIENT
Start: 2022-01-18 | End: 2022-02-24 | Stop reason: SDUPTHER

## 2022-01-18 NOTE — TELEPHONE ENCOUNTER
No new care gaps identified.  Powered by [employer] by [employer]. Reference number: [id number].   1/18/2022 1:55:56 PM CST

## 2022-01-18 NOTE — TELEPHONE ENCOUNTER
----- Message from Frank Cook sent at 2022  1:24 PM CST -----  Contact: Patient  Hafsa Hawley  MRN: [id number]  : 1965  PCP: Cristobal Ann  Home Phone      [phone]  Work Phone      Not on file.  Mobile          [phone]  Home Phone      [phone]      MESSAGE: has lost her diabetes testing kit -- requesting new meter, test strips, & lancets -- uses [facility] in [facility]    Call [phone]    PCP: Seth

## 2022-01-18 NOTE — TELEPHONE ENCOUNTER
I spoke with Mrs Hawley, who says her Pulm appt was cancelled today and the office will reschedule.  She expressed feeling lightheaded at times, and is having trouble remembering things like appts.  Also said her  told her that he is unable to be a caregiver for her if she needs a vad or transplant.  Offered support and encouragement, and asked her to get Covid screen on 1-31-22 and will move isidro appts all to 2-1.  Appt mailed to pt

## 2022-01-18 NOTE — TELEPHONE ENCOUNTER
----- Message from Candelaria Guzman sent at 1/18/2022 12:09 PM CST -----  Regarding: Call back  PT would like a call back from Kezia.  PT can be reached @ [phone]      Thanks

## 2022-01-21 ENCOUNTER — TELEPHONE (OUTPATIENT)
Dept: ELECTROPHYSIOLOGY | Facility: CLINIC | Age: 57
End: 2022-01-21
Payer: MEDICAID

## 2022-01-23 DIAGNOSIS — R76.8 ELEVATED SERUM IMMUNOGLOBULIN FREE LIGHT CHAINS: Primary | ICD-10-CM

## 2022-01-23 NOTE — PROGRESS NOTES
I need her to see Hematology for the abnormal light chains and to proceed with her PYP scan.  I spoke with her by phone today.  She has not been contacted for the PYP scans so I will forward this message to the schedulers as well as her pre transplant coordinator.  With regards to the free light chains, both were elevated with a normal ratio so this is likely a nonspecific finding since there is no monoclonal spike on urine or blood.  However, she should see Hematology just to close the loop since there is also some consideration of transplant.  I will place a Hematology consult order in today

## 2022-01-25 ENCOUNTER — TELEPHONE (OUTPATIENT)
Dept: TRANSPLANT | Facility: CLINIC | Age: 57
End: 2022-01-25
Payer: MEDICAID

## 2022-01-25 NOTE — TELEPHONE ENCOUNTER
Return call and patient's spouse reports that she is at a doctors appointment. Dr Orozco is out of the office today and returning tomorrow. Once he review the labs we will call with instruction for the Entresto. Patient's spouse verbalized understanding and states he will let the patient know.      ----- Message from Ina Woody sent at 1/25/2022 10:40 AM CST -----  Regarding: Medication  Pt [phone] would like a call on how to take her Entresto and the dosage.    Thanks

## 2022-01-26 ENCOUNTER — TELEPHONE (OUTPATIENT)
Dept: TRANSPLANT | Facility: CLINIC | Age: 57
End: 2022-01-26
Payer: MEDICAID

## 2022-01-26 NOTE — TELEPHONE ENCOUNTER
Call made to patient regarding the dose of Entresto. Labs reviewed with Dr Orozco and patient should continue the prescribed dose of Entresto 49-51 mg BID. Patient is scheduled in the HF clinic next week. If any changes are needed Dr Loni Camilo will make those changes at that time. Patient verbalized understanding and all questions answered.

## 2022-01-27 DIAGNOSIS — E11.9 TYPE 2 DIABETES MELLITUS WITHOUT COMPLICATION: ICD-10-CM

## 2022-01-28 ENCOUNTER — TELEPHONE (OUTPATIENT)
Dept: FAMILY MEDICINE | Facility: CLINIC | Age: 57
End: 2022-01-28
Payer: MEDICAID

## 2022-01-28 NOTE — TELEPHONE ENCOUNTER
----- Message from Frank Cook sent at 2022 10:08 AM CST -----  Contact: Patient  Hafsa Hawley  MRN: [id number]  : 1965  PCP: Cristobal Ann  Home Phone      [phone]  Work Phone      Not on file.  Mobile          [phone]  Home Phone      [phone]      MESSAGE: called yesterday Re: foot pain -- has question Re: medication     Call [phone]    PCP: Seth

## 2022-01-28 NOTE — TELEPHONE ENCOUNTER
----- Message from Frank Cook sent at 2022 12:32 PM CST -----  Contact: Patient  Hafsa Hawley  MRN: [id number]  : 1965  PCP: Cristobal Ann  Home Phone      [phone]  Work Phone      Not on file.  Mobile          [phone]  Home Phone      [phone]      MESSAGE: horrible foot pain - causing nausea -- unable to walk on it -- requesting to speak with nurse ASAP    Call [phone]    PCP: Seth

## 2022-01-28 NOTE — TELEPHONE ENCOUNTER
Spoke with pt--she is calling with c/o leg swelling/fluid build up, foot pain, out of diabetic meds/Amaryl, Entresto, and  lidocaine patches that are not prescribed here advised her that Dr Ann is not in the office today and also with these concerning issues, she should go to the ER for eval and tx. She has also tried to reach the heart transplant team with no success.   Pt. presents to the ED with a small hematoma to the left side of his abdomen.

## 2022-01-28 NOTE — TELEPHONE ENCOUNTER
----- Message from Miriam Castro sent at 2022  4:11 PM CST -----  Contact: self  Hafsa Hawley  MRN: [id number]  : 1965  PCP: Cristobal Ann  Home Phone      [phone]  Work Phone      Not on file.  Mobile          [phone]  Home Phone      [phone]      MESSAGE:   PT is requesting refill on     glimepiride (AMARYL) 2 MG tablet       states that she's mixed up all her medications and is taking things she shouldn't have been taking but there is another DrTyra Outside of Dr. NORRIS that she is trying to notify regarding that .      LOV:2021    [facility]

## 2022-01-31 ENCOUNTER — DOCUMENTATION ONLY (OUTPATIENT)
Dept: TRANSPLANT | Facility: CLINIC | Age: 57
End: 2022-01-31
Payer: MEDICAID

## 2022-01-31 ENCOUNTER — PATIENT OUTREACH (OUTPATIENT)
Dept: ADMINISTRATIVE | Facility: OTHER | Age: 57
End: 2022-01-31
Payer: MEDICAID

## 2022-01-31 ENCOUNTER — CLINICAL SUPPORT (OUTPATIENT)
Dept: CARDIOLOGY | Facility: HOSPITAL | Age: 57
End: 2022-01-31
Payer: MEDICAID

## 2022-01-31 ENCOUNTER — TELEPHONE (OUTPATIENT)
Dept: TRANSPLANT | Facility: CLINIC | Age: 57
End: 2022-01-31
Payer: MEDICAID

## 2022-01-31 DIAGNOSIS — Z95.810 PRESENCE OF AUTOMATIC (IMPLANTABLE) CARDIAC DEFIBRILLATOR: ICD-10-CM

## 2022-01-31 PROCEDURE — 93295 CARDIAC DEVICE CHECK - REMOTE: ICD-10-PCS | Mod: NTX,,, | Performed by: INTERNAL MEDICINE

## 2022-01-31 PROCEDURE — 93296 REM INTERROG EVL PM/IDS: CPT | Mod: NTX | Performed by: INTERNAL MEDICINE

## 2022-01-31 PROCEDURE — 93295 DEV INTERROG REMOTE 1/2/MLT: CPT | Mod: NTX,,, | Performed by: INTERNAL MEDICINE

## 2022-01-31 NOTE — PROGRESS NOTES
LINKS immunization registry updated  Care Everywhere updated  Health Maintenance updated  Chart reviewed for overdue Proactive [facility] Encounters (ANGELA) health maintenance testing (CRS, Breast Ca, Diabetic Eye Exam)   Orders entered:N/A  Colonoscopy scheduled for  3/11/22

## 2022-01-31 NOTE — TELEPHONE ENCOUNTER
Pt was unable to get rapid test done at [facility]; is now at Urgent Care, where she is getting the rapid test and will bring a paper copy to her 8 am CPX in am.

## 2022-01-31 NOTE — PROGRESS NOTES
Completed a review of chart; pt is scheduled for pyp scan and Hem Onc per Dr Mckeon's last note.  She is scheduled to FU in [facility] clinic on 2-1-22

## 2022-01-31 NOTE — TELEPHONE ENCOUNTER
I spoke with Ms Hawley- reminded to go get her rapid covid screen today and come to her appts in [place] tomorrow

## 2022-02-01 ENCOUNTER — OFFICE VISIT (OUTPATIENT)
Dept: SURGERY | Facility: CLINIC | Age: 57
End: 2022-02-01
Payer: MEDICAID

## 2022-02-01 ENCOUNTER — HOSPITAL ENCOUNTER (OUTPATIENT)
Dept: CARDIOLOGY | Facility: HOSPITAL | Age: 57
Discharge: HOME | End: 2022-02-01
Attending: INTERNAL MEDICINE
Payer: MEDICAID

## 2022-02-01 ENCOUNTER — OFFICE VISIT (OUTPATIENT)
Dept: TRANSPLANT | Facility: CLINIC | Age: 57
End: 2022-02-01
Payer: MEDICAID

## 2022-02-01 ENCOUNTER — CLINICAL SUPPORT (OUTPATIENT)
Dept: TRANSPLANT | Facility: CLINIC | Age: 57
End: 2022-02-01
Payer: MEDICAID

## 2022-02-01 VITALS
SYSTOLIC BLOOD PRESSURE: 137 MMHG | HEIGHT: 66 IN | SYSTOLIC BLOOD PRESSURE: 112 MMHG | HEART RATE: 60 BPM | OXYGEN SATURATION: 100 % | DIASTOLIC BLOOD PRESSURE: 77 MMHG | HEART RATE: 70 BPM | WEIGHT: 203.5 LBS | HEIGHT: 66 IN | WEIGHT: 207 LBS | BODY MASS INDEX: 33.27 KG/M2 | BODY MASS INDEX: 32.71 KG/M2 | DIASTOLIC BLOOD PRESSURE: 73 MMHG

## 2022-02-01 VITALS
BODY MASS INDEX: 32.62 KG/M2 | HEART RATE: 60 BPM | WEIGHT: 203 LBS | HEIGHT: 66 IN | SYSTOLIC BLOOD PRESSURE: 109 MMHG | DIASTOLIC BLOOD PRESSURE: 60 MMHG

## 2022-02-01 DIAGNOSIS — I42.8 NICM (NONISCHEMIC CARDIOMYOPATHY): Chronic | ICD-10-CM

## 2022-02-01 DIAGNOSIS — I51.7 LEFT VENTRICULAR HYPERTROPHY: Chronic | ICD-10-CM

## 2022-02-01 DIAGNOSIS — E11.65 TYPE 2 DIABETES MELLITUS WITH HYPERGLYCEMIA, WITHOUT LONG-TERM CURRENT USE OF INSULIN: ICD-10-CM

## 2022-02-01 DIAGNOSIS — Z76.82 HEART TRANSPLANT CANDIDATE: Primary | ICD-10-CM

## 2022-02-01 DIAGNOSIS — Z76.82 HEART TRANSPLANT CANDIDATE: ICD-10-CM

## 2022-02-01 DIAGNOSIS — Z95.810 ICD (IMPLANTABLE CARDIOVERTER-DEFIBRILLATOR) IN PLACE: Chronic | ICD-10-CM

## 2022-02-01 DIAGNOSIS — K59.04 CHRONIC IDIOPATHIC CONSTIPATION: Primary | ICD-10-CM

## 2022-02-01 DIAGNOSIS — I50.42 CHRONIC COMBINED SYSTOLIC AND DIASTOLIC HEART FAILURE: ICD-10-CM

## 2022-02-01 PROBLEM — I50.43 ACUTE ON CHRONIC COMBINED SYSTOLIC AND DIASTOLIC CONGESTIVE HEART FAILURE: Status: RESOLVED | Noted: 2021-03-03 | Resolved: 2022-02-01

## 2022-02-01 PROBLEM — I50.43 ACUTE ON CHRONIC COMBINED SYSTOLIC AND DIASTOLIC HEART FAILURE: Status: RESOLVED | Noted: 2021-11-30 | Resolved: 2022-02-01

## 2022-02-01 LAB
CV STRESS BASE HR: 60 BPM
DIASTOLIC BLOOD PRESSURE: 73 MMHG
OHS CV CPX 1 MINUTE RECOVERY HEART RATE: 70 BPM
OHS CV CPX 85 PERCENT MAX PREDICTED HEART RATE MALE: 133
OHS CV CPX DATA GRADE - PEAK: 0.6
OHS CV CPX DATA O2 SAT - PEAK: 91
OHS CV CPX DATA O2 SAT - REST: 92
OHS CV CPX DATA SPEED - PEAK: 1.2
OHS CV CPX DATA TIME - PEAK: 1.13
OHS CV CPX DATA VE/VCO2 - PEAK: 31
OHS CV CPX DATA VE/VO2 - PEAK: 27
OHS CV CPX DATA VO2 - PEAK: 7.9
OHS CV CPX DATA VO2 - REST: 3.9
OHS CV CPX FEV1/FVC: 0.8
OHS CV CPX FORCED EXPIRATORY VOLUME: 1.11
OHS CV CPX FORCED VITAL CAPACITY (FVC): 1.39
OHS CV CPX HIGHEST VO: 30.2
OHS CV CPX MAX PREDICTED HEART RATE: 157
OHS CV CPX MAXIMAL VOLUNTARY VENTILATION (MVV) PREDICTED: 44.4
OHS CV CPX MAXIMAL VOLUNTARY VENTILATION (MVV): 32
OHS CV CPX MAXIUMUM EXERCISE VENTILATION (VE MAX): 20
OHS CV CPX PATIENT AGE: 56
OHS CV CPX PATIENT HEIGHT IN: 66
OHS CV CPX PATIENT IS FEMALE AGE 11-19: 0
OHS CV CPX PATIENT IS FEMALE AGE GREATER THAN 19: 1
OHS CV CPX PATIENT IS FEMALE AGE LESS THAN 11: 0
OHS CV CPX PATIENT IS FEMALE: 1
OHS CV CPX PATIENT IS MALE AGE 11-25: 0
OHS CV CPX PATIENT IS MALE AGE GREATER THAN 25: 0
OHS CV CPX PATIENT IS MALE AGE LESS THAN 11: 0
OHS CV CPX PATIENT IS MALE GREATER THAN 18: 0
OHS CV CPX PATIENT IS MALE LESS THAN OR EQUAL TO 18: 0
OHS CV CPX PATIENT IS MALE: 0
OHS CV CPX PATIENT WEIGHT RETURNED IN OZ: 3312
OHS CV CPX PEAK DIASTOLIC BLOOD PRESSURE: 56 MMHG
OHS CV CPX PEAK HEAR RATE: 74 BPM
OHS CV CPX PEAK RATE PRESSURE PRODUCT: 7918
OHS CV CPX PEAK SYSTOLIC BLOOD PRESSURE: 107 MMHG
OHS CV CPX PERCENT BODY FAT: 27.4
OHS CV CPX PERCENT MAX PREDICTED HEART RATE ACHIEVED: 47
OHS CV CPX PREDICTED VO2: 30.2 ML/KG/MIN
OHS CV CPX RATE PRESSURE PRODUCT PRESENTING: 6720
OHS CV CPX REST PET CO2: 34
OHS CV CPX VE/VCO2 SLOPE: 32.9
STRESS ECHO POST EXERCISE DUR MIN: 1 MINUTES
STRESS ECHO POST EXERCISE DUR SEC: 8 SECONDS
SYSTOLIC BLOOD PRESSURE: 112 MMHG

## 2022-02-01 PROCEDURE — 94621 CARDIOPULM EXERCISE TESTING: CPT | Mod: 26,TXP,, | Performed by: INTERNAL MEDICINE

## 2022-02-01 PROCEDURE — 99213 PR OFFICE/OUTPT VISIT, EST, LEVL III, 20-29 MIN: ICD-10-PCS | Mod: S$PBB,NTX,, | Performed by: COLON & RECTAL SURGERY

## 2022-02-01 PROCEDURE — 99214 PR OFFICE/OUTPT VISIT, EST, LEVL IV, 30-39 MIN: ICD-10-PCS | Mod: S$PBB,TXP,, | Performed by: INTERNAL MEDICINE

## 2022-02-01 PROCEDURE — 94621 CARDIOPULMONARY EXERCISE TESTING (CUPID ONLY): ICD-10-PCS | Mod: 26,TXP,, | Performed by: INTERNAL MEDICINE

## 2022-02-01 PROCEDURE — 3078F DIAST BP <80 MM HG: CPT | Mod: CPTII,TXP,, | Performed by: INTERNAL MEDICINE

## 2022-02-01 PROCEDURE — 3074F SYST BP LT 130 MM HG: CPT | Mod: CPTII,NTX,, | Performed by: COLON & RECTAL SURGERY

## 2022-02-01 PROCEDURE — 4010F ACE/ARB THERAPY RXD/TAKEN: CPT | Mod: CPTII,TXP,, | Performed by: INTERNAL MEDICINE

## 2022-02-01 PROCEDURE — 1159F MED LIST DOCD IN RCRD: CPT | Mod: CPTII,TXP,, | Performed by: INTERNAL MEDICINE

## 2022-02-01 PROCEDURE — 4010F PR ACE/ARB THEARPY RXD/TAKEN: ICD-10-PCS | Mod: CPTII,TXP,, | Performed by: INTERNAL MEDICINE

## 2022-02-01 PROCEDURE — 3078F DIAST BP <80 MM HG: CPT | Mod: CPTII,NTX,, | Performed by: COLON & RECTAL SURGERY

## 2022-02-01 PROCEDURE — 1159F PR MEDICATION LIST DOCUMENTED IN MEDICAL RECORD: ICD-10-PCS | Mod: CPTII,TXP,, | Performed by: INTERNAL MEDICINE

## 2022-02-01 PROCEDURE — 3008F BODY MASS INDEX DOCD: CPT | Mod: CPTII,TXP,, | Performed by: INTERNAL MEDICINE

## 2022-02-01 PROCEDURE — 3078F PR MOST RECENT DIASTOLIC BLOOD PRESSURE < 80 MM HG: ICD-10-PCS | Mod: CPTII,NTX,, | Performed by: COLON & RECTAL SURGERY

## 2022-02-01 PROCEDURE — 99214 OFFICE O/P EST MOD 30 MIN: CPT | Mod: S$PBB,TXP,, | Performed by: INTERNAL MEDICINE

## 2022-02-01 PROCEDURE — 99999 PR PBB SHADOW E&M-EST. PATIENT-LVL IV: CPT | Mod: PBBFAC,TXP,, | Performed by: COLON & RECTAL SURGERY

## 2022-02-01 PROCEDURE — 3008F BODY MASS INDEX DOCD: CPT | Mod: CPTII,NTX,, | Performed by: COLON & RECTAL SURGERY

## 2022-02-01 PROCEDURE — 99215 OFFICE O/P EST HI 40 MIN: CPT | Mod: PBBFAC,25,27,TXP | Performed by: INTERNAL MEDICINE

## 2022-02-01 PROCEDURE — 3078F PR MOST RECENT DIASTOLIC BLOOD PRESSURE < 80 MM HG: ICD-10-PCS | Mod: CPTII,TXP,, | Performed by: INTERNAL MEDICINE

## 2022-02-01 PROCEDURE — 3075F PR MOST RECENT SYSTOLIC BLOOD PRESS GE 130-139MM HG: ICD-10-PCS | Mod: CPTII,TXP,, | Performed by: INTERNAL MEDICINE

## 2022-02-01 PROCEDURE — 99214 OFFICE O/P EST MOD 30 MIN: CPT | Mod: PBBFAC,25,TXP | Performed by: COLON & RECTAL SURGERY

## 2022-02-01 PROCEDURE — 94621 CARDIOPULM EXERCISE TESTING: CPT | Mod: TXP

## 2022-02-01 PROCEDURE — 3008F PR BODY MASS INDEX (BMI) DOCUMENTED: ICD-10-PCS | Mod: CPTII,TXP,, | Performed by: INTERNAL MEDICINE

## 2022-02-01 PROCEDURE — 99999 PR PBB SHADOW E&M-EST. PATIENT-LVL IV: ICD-10-PCS | Mod: PBBFAC,TXP,, | Performed by: COLON & RECTAL SURGERY

## 2022-02-01 PROCEDURE — 3008F PR BODY MASS INDEX (BMI) DOCUMENTED: ICD-10-PCS | Mod: CPTII,NTX,, | Performed by: COLON & RECTAL SURGERY

## 2022-02-01 PROCEDURE — 3075F SYST BP GE 130 - 139MM HG: CPT | Mod: CPTII,TXP,, | Performed by: INTERNAL MEDICINE

## 2022-02-01 PROCEDURE — 99999 PR PBB SHADOW E&M-EST. PATIENT-LVL V: CPT | Mod: PBBFAC,TXP,, | Performed by: INTERNAL MEDICINE

## 2022-02-01 PROCEDURE — 99213 OFFICE O/P EST LOW 20 MIN: CPT | Mod: S$PBB,NTX,, | Performed by: COLON & RECTAL SURGERY

## 2022-02-01 PROCEDURE — 3074F PR MOST RECENT SYSTOLIC BLOOD PRESSURE < 130 MM HG: ICD-10-PCS | Mod: CPTII,NTX,, | Performed by: COLON & RECTAL SURGERY

## 2022-02-01 PROCEDURE — 99999 PR PBB SHADOW E&M-EST. PATIENT-LVL V: ICD-10-PCS | Mod: PBBFAC,TXP,, | Performed by: INTERNAL MEDICINE

## 2022-02-01 RX ORDER — SACUBITRIL AND VALSARTAN 97; 103 MG/1; MG/1
1 TABLET, FILM COATED ORAL 2 TIMES DAILY
Qty: 60 TABLET | Refills: 5 | Status: SHIPPED | OUTPATIENT
Start: 2022-02-01 | End: 2022-02-01

## 2022-02-01 NOTE — PATIENT INSTRUCTIONS
1. You have just the right amount of fluid on you.  2. Please adhere to a low sodium diet (no more than 1.5 grams of sodium in 24h).  3.   Follow fluid restriction of  1. no more than 2 liters in 24 hours..  4. No changes on medications today.  5. Follow up in 1 month with Dr. Orozco.

## 2022-02-01 NOTE — LETTER
February 1, 2022        Benson Cortez  [address] [place] [address]  Phone: [phone]  Fax: [phone]             [id number]  [address]  [place] [address]  Phone: [phone]   Patient: Hafsa Hawley   MR Number: [id number]   YOB: 1965   Date of Visit: 2/1/2022       Dear Dr. Benson Cortez    Thank you for referring Hafsa Hawley to me for evaluation. Attached you will find relevant portions of my assessment and plan of care.    If you have questions, please do not hesitate to call me. I look forward to following Hafsa Hawley along with you.    Sincerely,    Burke Camilo MD    Enclosure    If you would like to receive this communication electronically, please contact [email or web address] or [phone] to request [employer] Link access.    [employer] Link is a tool which provides read-only access to select patient information with whom you have a relationship. Its easy to use and provides real time access to review your patients record including encounter summaries, notes, results, and demographic information.    If you feel you have received this communication in error or would no longer like to receive these types of communications, please e-mail [email or web address]

## 2022-02-01 NOTE — PROGRESS NOTES
CRS Office Visit Followup    Referring Md:   No referring provider defined for this encounter.    SUBJECTIVE:     Chief Complaint:  Anal fissure    History of Present Illness:     Course is as follows:  Patient is a 56 y.o. female presents with anal fissure  1/4/2022:  Her main complaint is bleeding and severe pain over the past month.  Denies any prolapse of tissue or perianal mass.  Historically, she had 3-4 bowel movements per day following her cholecystectomy.  Over the last several weeks, she has had new onset constipation having bowel movements once every 3 days.  She has remote history of a hemorrhoidectomy multiple years ago by Dr. Rothman at [facility].  For constipation, she has tried Linzess and Colace with no significant improvement.   - on exam, she had a posterior midline anal fissure with hypertonic anal sphincter and tenderness.   - given diltiazem cream for the fissure and Linzess for the constipation.     Functionally, her activity is limited.  She has underlying COPD and is on home oxygen since August of 2020 following COVID.  She has congestive heart failure and is on Eliquis.  She is being evaluated for an LVAD.    Current status:   2/1/22: anal pain is improving with the cream.  She is continuing to struggle with constipation.  She takes miralax intermittently.  Linzess was given last visit but was not picked up.  Her main concern is right foot pain.  She is taking narcotics that is worsening her constipation.      Last Colonoscopy:  None      Review of Systems:  Review of Systems   Constitutional: Positive for malaise/fatigue and weight loss. Negative for chills, diaphoresis and fever.   HENT: Negative for congestion.    Respiratory: Positive for shortness of breath.    Cardiovascular: Positive for leg swelling. Negative for chest pain.   Gastrointestinal: Positive for blood in stool and constipation. Negative for abdominal pain, nausea and vomiting.   Genitourinary: Negative for  "dysuria.   Musculoskeletal: Negative for back pain and myalgias.   Skin: Negative for rash.   Neurological: Negative for dizziness and weakness.   Endo/Heme/Allergies: Bruises/bleeds easily.   Psychiatric/Behavioral: Negative for depression.       OBJECTIVE:     Vital Signs (Most Recent)  /60 (BP Location: Left arm, Patient Position: Sitting, BP Method: Large (Automatic))   Pulse 60   Ht 5' 6" (1.676 m)   Wt 92.1 kg (203 lb)   LMP  (LMP Unknown)   BMI 32.77 kg/m²     Physical Exam:  General: Black or  female in no distress   Neuro: alert and oriented x 4.  Moves all extremities.     HEENT: no icterus.  Trachea midline  Respiratory: respirations are even and unlabored  Cardiac: regular rate  Abdomen:  Protuberant, soft, no masses  Extremities: Warm dry and intact  Skin: no rashes  Anorectal: deferred today.  From prior exam: posterior midline anal fissure with hypertonic anal sphincter and tenderness.    Labs:  H&H 11 and 37.  Creatinine 2.5.    Imaging: CT abdomen pelvis from 12/01/2021 personally reviewed demonstrates normal appearing colon with no significant stool burden.  Cardiomegaly with small pericardial effusion noted.      ASSESSMENT/PLAN:     Hafsa was seen today for follow-up.    Diagnoses and all orders for this visit:    Chronic idiopathic constipation  -     linaCLOtide (LINZESS) 145 mcg Cap capsule; Take 1 capsule (145 mcg total) by mouth before breakfast. Hold if diarrhea        56 y.o. female with change in her bowel habits with new onset constipation resulting in an anal fissure.  Anal fissure improved with diltiazem cream.  Constipation persists.  Will re-send Linzess to a different pharmacy.  She will follow up with me via the portal to let me know if we need to adjust the Linzess.       GEORGE Ramirez MD, FACS, FASCRS  Staff Surgeon  Colon & Rectal Surgery        "

## 2022-02-01 NOTE — PROGRESS NOTES
Advanced Heart Failure and Transplantation Clinic Follow up.        Attending Physician: Burke Camilo MD.  The patient's last visit with me was on Visit date not found.       Patient ID:  Hafsa Hawley is a 56 y.o. female who presents for follow-up of Heart Transplant Pre-evaluation     HPI:  56-year-old black female recently discharged was referred back to this clinic for continuation of evaluation for advanced options.  She reports that she cannot walk more than 100 ft even on a good day and time she cannot walk across the room.  She is supposed to be using oxygen 24/7 but admits that she does not always use it.  She sleeps on 2 pillows.  No definite PND.  No symptomatic arrhythmia.       Patient came in a wheelchair today. She said she is having a flare of gout in her right foot. She has been using a wheelchair for the last week. She said she managed to do part of the exercise test today. She seemed confused about her medications. She was asking today what doses of entresto and other medications she was supposed to be taking.    Review of Systems   Constitutional: Negative for chills, diaphoresis and fever.   HENT: Negative for nasal congestion, rhinorrhea and sore throat.    Eyes: Negative for visual disturbance.   Respiratory: Positive for shortness of breath.    Cardiovascular: Negative for chest pain.   Gastrointestinal: Negative for abdominal pain, diarrhea, nausea and vomiting.   Genitourinary: Negative for difficulty urinating, dysuria and hematuria.   Integumentary:  Negative for rash.   Neurological: Negative for seizures, syncope and light-headedness.   Psychiatric/Behavioral: Negative for agitation and hallucinations.        Past Medical History:   Diagnosis Date    Anemia     Anticoagulant long-term use     Arthritis     Atrial fibrillation     Congestive heart failure     COPD (chronic obstructive  pulmonary disease)     Deep vein thrombosis     elevated bilirubin d/t Gilbert's syndrome     confirmed by [place] genetic testing, evaluated by hepatology    Encounter for blood transfusion     GERD (gastroesophageal reflux disease)     Hypertension     Pheochromocytoma, malignant     Right kidney mass     Sleep apnea     Thalassemia trait, alpha     Thyroid disease     Type 2 diabetes mellitus with hyperglycemia, without long-term current use of insulin 2020        Past Surgical History:   Procedure Laterality Date    APPENDECTOMY      BONE MARROW BIOPSY      CARDIAC DEFIBRILLATOR PLACEMENT Left 2016    CARDIAC ELECTROPHYSIOLOGY MAPPING AND ABLATION      EYE SURGERY      due to running tears    FRACTURE SURGERY Left     hand 5th digit    HYSTERECTOMY      KNEE SURGERY Left 2016    hematoma    LIVER BIOPSY  10/24/2018    Minimal steatosis, predominantly macrovesicular, 1%, Minimal nonspecific portal inflammation, no fibrosis. No findings on biopsy to explain elevated bilirubin levels. Could be d/t Gilbert's =?- hemolysis    RIGHT HEART CATHETERIZATION Right 2021    Procedure: INSERTION, CATHETER, RIGHT HEART;  Surgeon: Irving Cardenas MD;  Location: [facility] CATH LAB;  Service: Cardiology;  Laterality: Right;    TRANSJUGULAR BIOPSY OF LIVER N/A 10/24/2018    Procedure: BIOPSY, LIVER, TRANSJUGULAR APPROACH;  Surgeon: Carmen Diagnostic Provider;  Location: [facility] OR [address] [place] [address];  Service: Radiology;  Laterality: N/A;        Family History   Problem Relation Age of Onset    Cancer Mother         pancreatic CA early 50's    Heart disease Father          MI in late 50's    Hypertension Father     Heart attack Father     Heart disease Sister     Heart disease Brother     Cirrhosis Brother         alcoholic    Heart disease Sister     Heart disease Brother     Hypertension Brother     Diabetes Brother         Review of patient's allergies indicates:   Allergen Reactions    Penicillins  "Hives    Iodinated contrast media Nausea And Vomiting    Oxycodone-acetaminophen Other (See Comments)     Nausea, Dizziness, Anxiety.  "I don't like how it makes me feel."   Given Hydromorphone 0.5mg IVP  Without problems.  Other reaction(s): Other (See Comments)    Diovan hct [valsartan-hydrochlorothiazide] Other (See Comments)     Causes coughing    Irinotecan      Pt has homozygosity for the TA7 promoter variant that places this individual at significantly increased risk for   severe neutropenia (grade 4) when treated with the standard dose of irinotecan (risk approximately 50%).   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 Metabolism of other drugs not listed here may also be impacted by UGT1A1 enzyme activity.       Tramadol Nausea And Vomiting     Other reaction(s): Other (See Comments)        Current Outpatient Medications   Medication Instructions    allopurinoL (ZYLOPRIM) 100 mg, Oral, Daily    ALPRAZolam (XANAX) 2 mg, Oral, 2 times daily PRN    atorvastatin (LIPITOR) 40 mg, Oral, Daily    b complex vitamins tablet 1 tablet, Oral, Daily    blood sugar diagnostic (ACCU-CHEK GUIDE TEST STRIPS) Strp 1 strip, Other, 3 times daily    blood-glucose meter kit Use as instructed    budesonide-formoterol 80-4.5 mcg (SYMBICORT) 80-4.5 mcg/actuation HFAA Inhale 2 puffs twice a day by inhalation route.    diclofenac sodium (VOLTAREN) 2 g, Topical (Top), 3 times daily PRN    diltiazem HCl (DILTIAZEM 2% CREAM) Topical (Top), 3 times daily, Apply topically to anal area.    ELIQUIS 5 mg Tab TAKE 1 TABLET BY MOUTH TWICE A DAY    empagliflozin (JARDIANCE) 25 mg tablet Jardiance 25 mg tablet    ergocalciferol (ERGOCALCIFEROL) 50,000 Units, Oral, Every 7 days    ferrous sulfate 325 mg, Oral, Daily    fluticasone propionate (FLONASE) 50 mcg/actuation nasal spray 2 sprays, Each Nostril, " "Daily PRN    furosemide (LASIX) 80 mg, Oral, Daily, Use afternoon dose if needed    glimepiride (AMARYL) 2 mg, Oral, Daily    hydrocortisone 2.5 % cream Topical (Top), 2 times daily PRN    lancets (ACCU-CHEK SOFTCLIX LANCETS) Misc 1 Device, Misc.(Non-Drug; Combo Route), 3 times daily    linaCLOtide (LINZESS) 145 mcg, Oral, Before breakfast, Hold if diarrhea    metoprolol succinate (TOPROL-XL) 200 mg, Oral, 2 times daily    montelukast (SINGULAIR) 10 mg tablet Take 1 tablet every day by oral route for 30 days.    NITROSTAT 1 mg, Oral, Every 5 min PRN, No more than 3 tablets in 15 minutes.    pantoprazole (PROTONIX) 40 MG tablet TAKE 1 TABLET BY MOUTH DAILY IN THE MORNING    polyethylene glycol (GLYCOLAX) 17 g, Oral, 3 times daily PRN    rOPINIRole (REQUIP) 4 mg, Oral, Daily, Pt taking 4mg daily    sacubitriL-valsartan (ENTRESTO)  mg per tablet 1 tablet, Oral, 2 times daily    sertraline (ZOLOFT) 50 mg, Oral, Daily    SPIRIVA WITH HANDIHALER 18 mcg, Inhalation, Daily    spironolactone (ALDACTONE) 25 mg, Oral, Daily    VENTOLIN HFA 90 mcg/actuation inhaler 2 puffs, Inhalation, 2 times daily PRN    walker Misc 1 Device, Misc.(Non-Drug; Combo Route), As needed (PRN)        Vitals:    02/01/22 1029   BP: 137/77   Pulse: 70        Wt Readings from Last 3 Encounters:   02/01/22 92.3 kg (203 lb 7.8 oz)   02/01/22 92.1 kg (203 lb)   02/01/22 93.9 kg (207 lb)     Temp Readings from Last 3 Encounters:   12/31/21 97.8 °F (36.6 °C)   12/09/21 98.3 °F (36.8 °C)   11/29/21 97.9 °F (36.6 °C) (Temporal)     BP Readings from Last 3 Encounters:   02/01/22 137/77   02/01/22 109/60   02/01/22 112/73     Pulse Readings from Last 3 Encounters:   02/01/22 70   02/01/22 60   02/01/22 60        Body mass index is 32.84 kg/m². Estimated body surface area is 2.07 meters squared as calculated from the following:    Height as of this encounter: 5' 6" (1.676 m).    Weight as of this encounter: 92.3 kg (203 lb 7.8 oz). "     Physical Exam  Constitutional:       Appearance: She is well-developed and well-nourished.   HENT:      Head: Normocephalic and atraumatic.      Right Ear: External ear normal.      Left Ear: External ear normal.   Eyes:      Extraocular Movements: EOM normal.      Conjunctiva/sclera: Conjunctivae normal.      Pupils: Pupils are equal, round, and reactive to light.   Neck:      Vascular: No hepatojugular reflux or JVD.   Cardiovascular:      Rate and Rhythm: Normal rate and regular rhythm.      Pulses: Intact distal pulses.      Heart sounds: S1 normal and S2 normal. No murmur heard.  No friction rub. No gallop.    Pulmonary:      Effort: Pulmonary effort is normal.      Breath sounds: Normal breath sounds.   Abdominal:      General: Bowel sounds are normal. There is no distension.      Palpations: Abdomen is soft.      Tenderness: There is no abdominal tenderness. There is no guarding or rebound.   Musculoskeletal:         General: No edema.      Cervical back: Normal range of motion and neck supple.      Right lower leg: No edema.      Left lower leg: No edema.   Neurological:      Mental Status: She is alert and oriented to person, place, and time.      Deep Tendon Reflexes: Strength normal.          Lab Results   Component Value Date    BNP 1,432 (H) 12/08/2021     01/07/2022    K 4.7 01/07/2022    MG 2.4 12/09/2021     01/07/2022    CO2 28 01/07/2022    BUN 48 (H) 01/07/2022    CREATININE 2.1 (H) 01/07/2022    GLU 74 01/07/2022    HGBA1C 5.3 12/03/2021    AST 26 12/08/2021    ALT 29 12/08/2021    ALBUMIN 3.4 (L) 12/08/2021    PROT 6.2 12/08/2021    BILITOT 2.1 (H) 12/08/2021    WBC 4.23 12/22/2021    HGB 10.5 (L) 12/22/2021    HCT 37.6 12/22/2021    HCT 45 12/07/2021     12/22/2021    INR 1.0 12/04/2021     (H) 12/03/2021    TSH 1.561 12/04/2021    CHOL 110 (L) 12/03/2021    HDL 62 12/03/2021    LDLCALC 33.2 (L) 12/03/2021    TRIG 74 12/03/2021    B3NEWET 8.4 09/11/2017    FREET4  1.07 05/09/2018       @LABRCNTIP(cpk,cpkmb,troponini,mb)@     No results found for this visit on 02/01/22.       Results for orders placed during the hospital encounter of 11/30/21    Echo    Interpretation Summary  · The left ventricle is severely enlarged with eccentric hypertrophy and severely decreased systolic function. The estimated ejection fraction is 25%.  · There is severe left ventricular global hypokinesis.  · Mild right ventricular enlargement with moderately reduced right ventricular systolic function.  · Grade II left ventricular diastolic dysfunction.  · Biatrial enlargement.  · The estimated PA systolic pressure is 59 mmHg.  · Elevated central venous pressure (15 mmHg).  · The ascending aorta is mildly dilated.  · Small posterolateral pericardial effusion.        Results for orders placed during the hospital encounter of 11/30/21    Cardiac catheterization    Conclusion  · The estimated blood loss was <50 mL.  · RHC performed via the right IJ. Patient tolerated the procedure well. Upper normal left and normal right side filling pressures (RA=6 mm of Hg, PCWP=15 mm of Hg). Mild pulmonary HTN (PA=51/17 mm of Hg, PA mean=32 mm of Hg). Low cardiac index and output (CI=1.9 L/mion/m2, CO=3.8 L/min) off inotropes    The procedure log was documented by Documenter: Lawanda Gagnon RDCS and verified by Irving Cardenas MD.    Date: 12/7/2021  Time: 3:49 PM         Assessment and Plan:  Heart transplant candidate  -     CBC Auto Differential; Future; Expected date: 02/01/2022  -     Comprehensive Metabolic Panel; Future; Expected date: 02/01/2022  -     NT-Pro Natriuretic Peptide; Future; Expected date: 02/01/2022  -     CBC Auto Differential; Future; Expected date: 03/01/2022  -     Comprehensive Metabolic Panel; Future; Expected date: 03/01/2022  -     NT-Pro Natriuretic Peptide; Future; Expected date: 03/01/2022    NICM (nonischemic cardiomyopathy)    Left ventricular hypertrophy    ICD (implantable  cardioverter-defibrillator) in place    Chronic combined systolic and diastolic heart failure    Type 2 diabetes mellitus with hyperglycemia, without long-term current use of insulin    Other orders  -     Discontinue: sacubitriL-valsartan (ENTRESTO)  mg per tablet; Take 1 tablet by mouth 2 (two) times daily.  Dispense: 60 tablet; Refill: 5          1. Chronic systolic HF, NYHA class III, stage C.  Etiology: NCIM  Devices: ICD  Medications: high dose sacubitril-valsartan, metoprolol succinate 200 mg daily, spironolactone 25 mg daily.  Hemodynamic status: warm, normotensive, euvolemic.  Plan:  No changes on medications today.  Her CPX was not a good test as her RER was too low. Her Ve/VCO2 in that setting was 32.9.  Pending PYP scan work up for ATTR amyloidosis ordered by Dr. Orozco.    Follow up in 1 month.

## 2022-02-02 ENCOUNTER — NURSE TRIAGE (OUTPATIENT)
Dept: ADMINISTRATIVE | Facility: CLINIC | Age: 57
End: 2022-02-02
Payer: MEDICAID

## 2022-02-03 ENCOUNTER — NURSE TRIAGE (OUTPATIENT)
Dept: ADMINISTRATIVE | Facility: CLINIC | Age: 57
End: 2022-02-03
Payer: MEDICAID

## 2022-02-03 ENCOUNTER — TELEPHONE (OUTPATIENT)
Dept: CARDIOLOGY | Facility: HOSPITAL | Age: 57
End: 2022-02-03
Payer: MEDICAID

## 2022-02-03 ENCOUNTER — HOSPITAL ENCOUNTER (EMERGENCY)
Facility: HOSPITAL | Age: 57
Discharge: HOME | End: 2022-02-03
Attending: SURGERY
Payer: MEDICAID

## 2022-02-03 ENCOUNTER — PATIENT MESSAGE (OUTPATIENT)
Dept: TRANSPLANT | Facility: CLINIC | Age: 57
End: 2022-02-03
Payer: MEDICAID

## 2022-02-03 VITALS
DIASTOLIC BLOOD PRESSURE: 82 MMHG | TEMPERATURE: 99 F | RESPIRATION RATE: 16 BRPM | WEIGHT: 207.25 LBS | BODY MASS INDEX: 33.45 KG/M2 | HEART RATE: 72 BPM | OXYGEN SATURATION: 99 % | SYSTOLIC BLOOD PRESSURE: 137 MMHG

## 2022-02-03 DIAGNOSIS — R73.9 HYPERGLYCEMIA: Primary | ICD-10-CM

## 2022-02-03 LAB
ALBUMIN SERPL BCP-MCNC: 3.8 G/DL (ref 3.5–5.2)
ALP SERPL-CCNC: 72 U/L (ref 55–135)
ALT SERPL W/O P-5'-P-CCNC: 18 U/L (ref 10–44)
ANION GAP SERPL CALC-SCNC: 15 MMOL/L (ref 8–16)
ANISOCYTOSIS BLD QL SMEAR: ABNORMAL
AST SERPL-CCNC: 39 U/L (ref 10–40)
BASOPHILS # BLD AUTO: 0.01 K/UL (ref 0–0.2)
BASOPHILS NFR BLD: 0.2 % (ref 0–1.9)
BILIRUB SERPL-MCNC: 1.1 MG/DL (ref 0.1–1)
BUN SERPL-MCNC: 71 MG/DL (ref 6–20)
CALCIUM SERPL-MCNC: 9.7 MG/DL (ref 8.7–10.5)
CHLORIDE SERPL-SCNC: 103 MMOL/L (ref 95–110)
CO2 SERPL-SCNC: 21 MMOL/L (ref 23–29)
CREAT SERPL-MCNC: 2.6 MG/DL (ref 0.5–1.4)
DACRYOCYTES BLD QL SMEAR: ABNORMAL
DIFFERENTIAL METHOD: ABNORMAL
EOSINOPHIL # BLD AUTO: 0 K/UL (ref 0–0.5)
EOSINOPHIL NFR BLD: 0.2 % (ref 0–8)
ERYTHROCYTE [DISTWIDTH] IN BLOOD BY AUTOMATED COUNT: 27.3 % (ref 11.5–14.5)
EST. GFR  (AFRICAN AMERICAN): 23 ML/MIN/1.73 M^2
EST. GFR  (NON AFRICAN AMERICAN): 20 ML/MIN/1.73 M^2
GLUCOSE SERPL-MCNC: 345 MG/DL (ref 70–110)
HCT VFR BLD AUTO: 32.4 % (ref 37–48.5)
HGB BLD-MCNC: 9.5 G/DL (ref 12–16)
HYPOCHROMIA BLD QL SMEAR: ABNORMAL
IMM GRANULOCYTES # BLD AUTO: 0.02 K/UL (ref 0–0.04)
IMM GRANULOCYTES NFR BLD AUTO: 0.4 % (ref 0–0.5)
LYMPHOCYTES # BLD AUTO: 0.6 K/UL (ref 1–4.8)
LYMPHOCYTES NFR BLD: 12 % (ref 18–48)
MCH RBC QN AUTO: 17.1 PG (ref 27–31)
MCHC RBC AUTO-ENTMCNC: 29.3 G/DL (ref 32–36)
MCV RBC AUTO: 58 FL (ref 82–98)
MONOCYTES # BLD AUTO: 0.2 K/UL (ref 0.3–1)
MONOCYTES NFR BLD: 3.7 % (ref 4–15)
NEUTROPHILS # BLD AUTO: 4.2 K/UL (ref 1.8–7.7)
NEUTROPHILS NFR BLD: 83.5 % (ref 38–73)
NRBC BLD-RTO: 0 /100 WBC
PLATELET # BLD AUTO: 181 K/UL (ref 150–450)
PLATELET BLD QL SMEAR: ABNORMAL
PMV BLD AUTO: ABNORMAL FL (ref 9.2–12.9)
POCT GLUCOSE: 233 MG/DL (ref 70–110)
POTASSIUM SERPL-SCNC: 5.2 MMOL/L (ref 3.5–5.1)
PROT SERPL-MCNC: 8.1 G/DL (ref 6–8.4)
RBC # BLD AUTO: 5.55 M/UL (ref 4–5.4)
SCHISTOCYTES BLD QL SMEAR: ABNORMAL
SODIUM SERPL-SCNC: 139 MMOL/L (ref 136–145)
SPHEROCYTES BLD QL SMEAR: ABNORMAL
WBC # BLD AUTO: 5.07 K/UL (ref 3.9–12.7)

## 2022-02-03 PROCEDURE — 96374 THER/PROPH/DIAG INJ IV PUSH: CPT | Mod: NTX

## 2022-02-03 PROCEDURE — 63600175 PHARM REV CODE 636 W HCPCS: Mod: NTX | Performed by: SURGERY

## 2022-02-03 PROCEDURE — 80053 COMPREHEN METABOLIC PANEL: CPT | Mod: NTX | Performed by: SURGERY

## 2022-02-03 PROCEDURE — 99291 CRITICAL CARE FIRST HOUR: CPT | Mod: 25,NTX

## 2022-02-03 PROCEDURE — 36415 COLL VENOUS BLD VENIPUNCTURE: CPT | Mod: NTX | Performed by: SURGERY

## 2022-02-03 PROCEDURE — 85025 COMPLETE CBC W/AUTO DIFF WBC: CPT | Mod: NTX | Performed by: SURGERY

## 2022-02-03 RX ORDER — GLIMEPIRIDE 2 MG/1
2 TABLET ORAL DAILY
Qty: 30 TABLET | Refills: 0 | Status: SHIPPED | OUTPATIENT
Start: 2022-02-03 | End: 2022-03-15 | Stop reason: SDUPTHER

## 2022-02-03 RX ADMIN — INSULIN HUMAN 8 UNITS: 100 INJECTION, SOLUTION PARENTERAL at 01:02

## 2022-02-03 NOTE — TELEPHONE ENCOUNTER
"Patient is confused and suggesting the opposite treatment for hyperglycemia. IE she wanted to take the glucose tablets or eat peppermints. She has no meds to take at home. Glucose 400+ x2 She states she feels jittery and a little confused "She is very busy and forgot she had an appointment today"  Due to my nursing judgement this patient to have care provided in ED.  Reason for Disposition   Blood glucose > 400 mg/dL (22.2 mmol/L)     Patient is confused and suggesting the opposite treatment for hyperglycemia. IE she wanted to take the glucose taklets or eat peppermints. She has no meds to take at home. Glucose 400+ x2 She states she feels jittery. "She is very busy and forgot she had an appointment today"  Do to my nursing judgement this patient to have care provided in ED.    Additional Information   Negative: Unconscious or difficult to awaken   Negative: Acting confused (e.g., disoriented, slurred speech)   Negative: Very weak (e.g., can't stand)   Negative: Sounds like a life-threatening emergency to the triager   Negative: [1] Vomiting AND [2] signs of dehydration (e.g., very dry mouth, lightheaded, dark urine)   Negative: [1] Blood glucose > 240 mg/dL (13.3 mmol/L) AND [2] rapid breathing   Negative: Vomiting lasts > 4 hours   Negative: [1] New-onset diabetes suspected (e.g., frequent urination, weak, weight loss) AND [2] vomiting or rapid breathing   Negative: [1] Blood glucose > 240 mg/dL (13.3 mmol/L) AND [2] vomiting AND [3] unable to check for ketones (in blood or urine)   Negative: [1] Blood glucose > 240 mg/dL (13.3 mmol/L) AND [2] blood ketones > 1.4 mmol/L   Negative: [1] Blood glucose > 240 mg/dL (13.3 mmol/L) AND [2] urine ketones moderate-large (or more than 1+)   Negative: Blood glucose > 500 mg/dL (27.8 mmol/L)   Negative: Patient sounds very sick or weak to the triager   Negative: Fever > 100.4 F (38.0 C)    Protocols used: DIABETES - HIGH BLOOD SUGAR-A-AH    "

## 2022-02-03 NOTE — TELEPHONE ENCOUNTER
Had not been checking her CBG now glucose is 438 mg/dl.    Reason for Disposition   Patient sounds very sick or weak to the triager    Additional Information   Negative: Unconscious or difficult to awaken   Negative: Acting confused (e.g., disoriented, slurred speech)   Negative: Very weak (e.g., can't stand)   Negative: Sounds like a life-threatening emergency to the triager   Negative: [1] Vomiting AND [2] signs of dehydration (e.g., very dry mouth, lightheaded, dark urine)   Negative: [1] Blood glucose > 240 mg/dL (13.3 mmol/L) AND [2] rapid breathing   Negative: Vomiting lasts > 4 hours   Negative: [1] New-onset diabetes suspected (e.g., frequent urination, weak, weight loss) AND [2] vomiting or rapid breathing   Negative: [1] Blood glucose > 240 mg/dL (13.3 mmol/L) AND [2] vomiting AND [3] unable to check for ketones (in blood or urine)   Negative: [1] Blood glucose > 240 mg/dL (13.3 mmol/L) AND [2] blood ketones > 1.4 mmol/L   Negative: [1] Blood glucose > 240 mg/dL (13.3 mmol/L) AND [2] urine ketones moderate-large (or more than 1+)   Negative: Blood glucose > 500 mg/dL (27.8 mmol/L)    Protocols used: DIABETES - HIGH BLOOD SUGAR-A-

## 2022-02-03 NOTE — ED PROVIDER NOTES
[facility]                                                 I reviewed the ER triage nurse's note before evaluating the patient    Chief Complaint  56 y.o. female with Hyperglycemia     History of Present Illness  Hafsa Hawley presents to the emergency room with high blood sugars tonight  Patient states that she is out of 1 of her diabetes medications for 3 weeks or longer  Patient is alert appropriate, patient has no obvious evidence of diabetic ketoacidosis  Patient states that she needs a refill on her diabetes medication today from the ER  Blood sugar over 300, patient does not follow diabetic diet per ER interview today    The history is provided by the patient  Previous medical records were obtained from [facility]  Previous records are summarized from prior ER visits and hospitalizations    Past Medical History   -- Anemia     -- Anticoagulant long-term use     -- Arthritis     -- Atrial fibrillation     -- Congestive heart failure     -- COPD (chronic obstructive pulmonary disease)     -- Deep vein thrombosis     -- elevated bilirubin d/t Gilbert's syndrome     -- Encounter for blood transfusion     -- GERD (gastroesophageal reflux disease)     -- Hypertension     -- Pheochromocytoma, malignant     -- Right kidney mass     -- Sleep apnea     -- Thalassemia trait, alpha     -- Thyroid disease     -- Type 2 diabetes mellitus with hyperglycemia        Past Surgical History   -- APPENDECTOMY       -- BONE MARROW BIOPSY       -- CARDIAC DEFIBRILLATOR PLACEMENT       -- CARDIAC ELECTROPHYSIOLOGY MAPPING AND ABLATION       -- EYE SURGERY       -- FRACTURE SURGERY       -- HYSTERECTOMY       -- KNEE SURGERY       -- LIVER BIOPSY       -- RIGHT HEART CATHETERIZATION       -- TRANSJUGULAR BIOPSY OF LIVER       Allergic to penicillin, iodine, oxycodone, Diovan, irinocetan, Ultram  No significant family history  No significant social history, nonsmoker    I have reviewed all of this patient's past  medical, surgical, family, and social   histories as well as active allergies and medications documented in the  electronic medical record    Review of Systems and Physical Exam      Review of Systems (all other ROS are otherwise negative)  -- Constitution - no fever, denies fatigue, no weakness, no chills, night sweats  -- Eyes - no tearing or redness, no visual disturbance  -- Ear, Nose - no tinnitus or earache, no nasal congestion or discharge  -- Mouth,Throat - no sore throat, no toothache, normal voice, normal swallowing  -- Respiratory - denies cough and congestion, no shortness of breath, no LAGUERRE  -- Cardiovascular - denies chest pain, no palpitations, denies claudication  -- Gastrointestinal - denies abdominal pain, nausea, vomiting, or diarrhea  -- Genitourinary - no dysuria, no hematuria, no flank pain, no bladder pain  -- Musculoskeletal - denies back pain, negative for trauma or injury  -- Neurological - no headache, no numbness, tingling, seizure, balance issues  -- Hematologic- no bruising, no bleeding, nose bleed, bleeding disorders    Vital Signs (reviewed by the physician)  Her oral temperature is 98.5 °F (36.9 °C).   Her blood pressure is 167/72 and her pulse is 78.   Her respiration is 20 and oxygen saturation is 97%.     Physical Exam  -- Nursing note and vitals reviewed  -- Constitutional: Appears well-developed and well-nourished  -- Head: Atraumatic. Normocephalic. No obvious abnormality  -- Eyes: Pupils are equal and reactive to light. Normal conjunctiva and lids  -- Cardiac: Normal rate, regular rhythm and normal heart sounds  -- Respiratory: Normal respiratory effort, breath sounds clear to auscultation  -- Gastrointestinal: Soft, no tenderness. Normal bowel sounds. Normal liver edge  -- Musculoskeletal: Normal range of motion, no effusions. Joints stable   -- Neurological: No focal deficits. Showed good interaction with staff  -- Vascular: Posterior tibial, dorsalis pedis and radial pulses  2+ bilaterally      Emergency Room Course      Lab Results (reviewed by the physician)     K 5.2 (H)      CO2 21 (L)   BUN 71 (H)   CREATININE 2.6 (H)    (H)   ALKPHOS 72   AST 39   ALT 18   BILITOT 1.1 (H)   ALBUMIN 3.8   PROT 8.1   WBC 5.07   HGB 9.5 (L)   HCT 32.4 (L)        Medications Given  insulin regular injection 8 Units (8 Units Intravenous Given 2/3/22 0117)     Critical Care ED Physician Time (minutes):  -- Performed by: Adriano Low M.D.  -- Date/Time: 3:08 AM 2/3/2022   -- Direct Patient Care (Face Time): 5  -- Additional History from Records or Taking Additional History: 5  -- Ordering, Reviewing, and Interpreting Diagnostic Studies: 5  -- Total Time in Documentation: 11  -- Consultation with Other Physicians: 5  -- Consultation with Family Related to Condition: 0  -- Total Critical Care Time: 31  -- Critical care was necessary to treat hyperglycemia  -- Critical care was time spent personally by me on the following activities:   -- blood draw for specimens discussions with consultants,   -- development of treatment plan with patient or surrogate,   -- examination of patient, ordering and performing treatments   -- review of radiographic studies, re-evaluation of pt's condition  -- review of labs and evaluation of response to treatment    Medical Decision Making     ED Course/ED Management  -- patient with elevated blood sugar in the emergency room today  -- patient was given IV insulin with good decrease in blood sugar  -- patient states she is out of 1 of her diabetes medications for weeks  -- diabetes medication refilled at the patient's request today  -- follow-up with primary care physician next 48 hours after discharge  -- return to the ER with any concerns after ER discharge today    Assessment, Disposition, & Plan      Diagnosis  [R73.9] Hyperglycemia (Primary)    Disposition and Plan  -- Disposition: home  -- Condition: stable  -- Follow-up: Patient to follow up with  Cristobal Ann MD in 1-2 days.  -- I advised the patient that we have found no life threatening condition today  -- At this time, I believe the patient is clinically stable for discharge.   -- Pt understands that the visit today is to address immediate concerns   -- Further workup and evaluation as an outpatient may be required  -- The patient acknowledges that close follow up with a MD is required   -- Patient agrees to comply with all instruction and direction given in the ER    This note is dictated on M*Modal word recognition program.  There are word recognition mistakes that are occasionally missed on review.         Adriano Low MD  02/03/22 [id number]

## 2022-02-04 RX ORDER — TIOTROPIUM BROMIDE 18 UG/1
18 CAPSULE ORAL; RESPIRATORY (INHALATION) DAILY
Qty: 1 CAPSULE | Refills: 2 | Status: SHIPPED | OUTPATIENT
Start: 2022-02-04 | End: 2022-05-10

## 2022-02-04 NOTE — TELEPHONE ENCOUNTER
----- Message from Frank Cook sent at 2022 11:44 AM CST -----  Contact: Patient  Hafsa Hawley  MRN: [id number]  : 1965  PCP: Cristobal Ann  Home Phone      [phone]  Work Phone      Not on file.  Mobile          [phone]  Home Phone      [phone]      MESSAGE:   Pt requesting refill or new Rx.   Is this a refill or new RX:  refill  RX name and strength: Spiriva inhaler  Last office visit: 21  Is this a 30-day or 90-day RX:  30 day  Pharmacy name and location:  [facility] in [place]  Comments:      Phone: [phone]    PCP: Seth

## 2022-02-04 NOTE — TELEPHONE ENCOUNTER
No new care gaps identified.  Powered by [employer]. Reference number: [id number].   2/04/2022 12:46:11 PM CST

## 2022-02-07 ENCOUNTER — HOSPITAL ENCOUNTER (OUTPATIENT)
Dept: CARDIOLOGY | Facility: HOSPITAL | Age: 57
Discharge: HOME | End: 2022-02-07
Attending: INTERNAL MEDICINE
Payer: MEDICAID

## 2022-02-07 ENCOUNTER — CLINICAL SUPPORT (OUTPATIENT)
Dept: TRANSPLANT | Facility: CLINIC | Age: 57
End: 2022-02-07
Payer: MEDICAID

## 2022-02-07 DIAGNOSIS — I50.42 CHRONIC COMBINED SYSTOLIC AND DIASTOLIC HEART FAILURE: ICD-10-CM

## 2022-02-07 DIAGNOSIS — G63 POLYNEUROPATHY ASSOCIATED WITH UNDERLYING DISEASE: ICD-10-CM

## 2022-02-07 DIAGNOSIS — I42.8 INFILTRATIVE CARDIOMYOPATHY: ICD-10-CM

## 2022-02-07 LAB — OHS CV PLANAR SCORE: 0

## 2022-02-07 PROCEDURE — 78803 RP LOCLZJ TUM SPECT 1 AREA: CPT | Mod: 26,TXP,, | Performed by: INTERNAL MEDICINE

## 2022-02-07 PROCEDURE — 78803 RP LOCLZJ TUM SPECT 1 AREA: CPT | Mod: TXP

## 2022-02-07 PROCEDURE — 78803 CV PYP ATTR W SPECT (CUPID ONLY): ICD-10-PCS | Mod: 26,TXP,, | Performed by: INTERNAL MEDICINE

## 2022-02-07 NOTE — PROGRESS NOTES
Patient seen for VAD education today, AAO, states she does not have a caregiver at this time. Patient is tearful and states she is having a hard day, provided emotional support. Reports that she wants to do whatever she needs to in order to move forward with advanced options work up but she doesn't feel like she is in a place where she can take in education. She has upcoming f/u appt w/ Dr Orozco on 3/4. Will schedule for VAD education at that time. Instructed patient to identify a caregiver and have them come with her to the appointment for VAD education. Patient verbalized understanding.

## 2022-02-08 ENCOUNTER — TELEPHONE (OUTPATIENT)
Dept: TRANSPLANT | Facility: CLINIC | Age: 57
End: 2022-02-08
Payer: MEDICAID

## 2022-02-08 DIAGNOSIS — I42.8 NICM (NONISCHEMIC CARDIOMYOPATHY): Primary | ICD-10-CM

## 2022-02-08 DIAGNOSIS — E11.65 TYPE 2 DIABETES MELLITUS WITH HYPERGLYCEMIA, WITHOUT LONG-TERM CURRENT USE OF INSULIN: Primary | ICD-10-CM

## 2022-02-08 NOTE — TELEPHONE ENCOUNTER
I spoke with Ms Elizalde after reviewing with MD.  She needs to complete her evaluation in the next week or so.  She will check with her caregivers and see when they can meet next week.

## 2022-02-08 NOTE — TELEPHONE ENCOUNTER
Reviewed chart with Dr Alisa Camilo.  Ordered repeat CMP Lactate and nt pro today or tomorrow.  \Called pt and left a message with instructions to return my call.  Labs scheduled locally at [facility]

## 2022-02-08 NOTE — TELEPHONE ENCOUNTER
I spoke with Hafsa Hawley today.  She would like to complete her evaluation.  In regards to caregivers, she says that her daughter (age 32) can be primary and that her niece can be the backup caregiver.  Advised that they will need to meet with SW team.  Will schedule appt on her FU with Dr Orozco, and advised her to let me know if she and CG can come to clinic sooner.  Verbalized understanding of instructions.

## 2022-02-08 NOTE — TELEPHONE ENCOUNTER
----- Message from Burke Camilo MD sent at 2/8/2022  3:04 PM CST -----  Regarding: RE: creat up  I think we should repeat CMP, nt-pro and lactate today or tomorrow.   Thank you    HAVEN    ----- Message -----  From: Kezia Clifton RN  Sent: 2/8/2022   2:48 PM CST  To: Burke Camilo MD  Subject: creat up                                         Hello Dr OROURKE,  I am reviewing this lady's chart to try to get her ready to present.  I noticed her creat has steadily increased.  She is coming back next week to see SW   She last saw you 2-3-22  Can you look at her labs and make recommendations?  She told the ER doc last week that she has been out of one of her DM meds x 3 weeks  She is scheduled to fu with her PCP too.     THank you  Kezia

## 2022-02-09 ENCOUNTER — TELEPHONE (OUTPATIENT)
Dept: TRANSPLANT | Facility: CLINIC | Age: 57
End: 2022-02-09
Payer: MEDICAID

## 2022-02-09 NOTE — TELEPHONE ENCOUNTER
I called pt and left a detailed message that she has not gotten labs as ordered by Dr Alisa Camilo, and whether she can go later today or in the am.

## 2022-02-10 ENCOUNTER — TELEPHONE (OUTPATIENT)
Dept: TRANSPLANT | Facility: CLINIC | Age: 57
End: 2022-02-10
Payer: MEDICAID

## 2022-02-10 ENCOUNTER — LAB VISIT (OUTPATIENT)
Dept: LAB | Facility: HOSPITAL | Age: 57
End: 2022-02-10
Attending: INTERNAL MEDICINE
Payer: MEDICAID

## 2022-02-10 DIAGNOSIS — I42.8 NICM (NONISCHEMIC CARDIOMYOPATHY): ICD-10-CM

## 2022-02-10 LAB
ALBUMIN SERPL BCP-MCNC: 3.7 G/DL (ref 3.5–5.2)
ALP SERPL-CCNC: 60 U/L (ref 55–135)
ALT SERPL W/O P-5'-P-CCNC: 14 U/L (ref 10–44)
ANION GAP SERPL CALC-SCNC: 9 MMOL/L (ref 8–16)
AST SERPL-CCNC: 18 U/L (ref 10–40)
BILIRUB SERPL-MCNC: 1.3 MG/DL (ref 0.1–1)
BUN SERPL-MCNC: 60 MG/DL (ref 6–20)
CALCIUM SERPL-MCNC: 9.7 MG/DL (ref 8.7–10.5)
CHLORIDE SERPL-SCNC: 109 MMOL/L (ref 95–110)
CO2 SERPL-SCNC: 23 MMOL/L (ref 23–29)
CREAT SERPL-MCNC: 2.1 MG/DL (ref 0.5–1.4)
EST. GFR  (AFRICAN AMERICAN): 30 ML/MIN/1.73 M^2
EST. GFR  (NON AFRICAN AMERICAN): 26 ML/MIN/1.73 M^2
GLUCOSE SERPL-MCNC: 136 MG/DL (ref 70–110)
LDH SERPL L TO P-CCNC: 256 U/L (ref 110–260)
POTASSIUM SERPL-SCNC: 4 MMOL/L (ref 3.5–5.1)
PROT SERPL-MCNC: 7.4 G/DL (ref 6–8.4)
SODIUM SERPL-SCNC: 141 MMOL/L (ref 136–145)

## 2022-02-10 PROCEDURE — 80053 COMPREHEN METABOLIC PANEL: CPT | Mod: NTX | Performed by: INTERNAL MEDICINE

## 2022-02-10 PROCEDURE — 83880 ASSAY OF NATRIURETIC PEPTIDE: CPT | Mod: NTX | Performed by: INTERNAL MEDICINE

## 2022-02-10 PROCEDURE — 83615 LACTATE (LD) (LDH) ENZYME: CPT | Mod: NTX | Performed by: INTERNAL MEDICINE

## 2022-02-10 PROCEDURE — 36415 COLL VENOUS BLD VENIPUNCTURE: CPT | Mod: TXP | Performed by: INTERNAL MEDICINE

## 2022-02-10 NOTE — PROGRESS NOTES
Today's labs reviewed and sent to MD for addl orders.  Creat has improved somewhat to 2.1, however t bili is now 1.3.  Pt endorses not feeling well for the past few days.

## 2022-02-10 NOTE — TELEPHONE ENCOUNTER
I spoke with pt this am- she said that did not get my messages re labs until this morning.  Advised to go to [facility] and get labs.  Pt also expressed that needs a new PCP, that Dr Ann did not fill the correct prescriptions.  Also endorses that she has not been feeling well.  Advised to go to the lab this am or if she really feels poorly, she should go to the ER.  Verbalized understanding of instructions.

## 2022-02-11 ENCOUNTER — TELEPHONE (OUTPATIENT)
Dept: TRANSPLANT | Facility: CLINIC | Age: 57
End: 2022-02-11
Payer: MEDICAID

## 2022-02-11 NOTE — TELEPHONE ENCOUNTER
Called and spoke with pt. Pt states that she did go to the ER. Pt states there is a few other things she will like to discuss with you. Pt states that she has been weak, nauseated, throwing up, and fatigue. Pt has an appointment scheduled with you on 2/18/2022

## 2022-02-11 NOTE — TELEPHONE ENCOUNTER
----- Message from Burke Camilo MD sent at 2/10/2022  6:39 PM CST -----  No changes.  Her renal function is back to baseline.    ----- Message -----  From: Kezia Clifton RN  Sent: 2/10/2022   3:33 PM CST  To: Burke Camilo MD    Today's labs reviewed and sent to MD for addl orders.  Creat has improved somewhat to 2.1, however t bili is now 1.3.  Pt endorses not feeling well for the past few days.          2/11/22   823am---advised pt that Dr Cuellar has reviewed her labs and recommends no changes at this time.  She will have labs and clinic visit next week.

## 2022-02-12 LAB — NT-PROBNP SERPL-MCNC: 2118 PG/ML

## 2022-02-15 ENCOUNTER — TELEPHONE (OUTPATIENT)
Dept: FAMILY MEDICINE | Facility: CLINIC | Age: 57
End: 2022-02-15
Payer: MEDICAID

## 2022-02-15 ENCOUNTER — CLINICAL SUPPORT (OUTPATIENT)
Dept: INFECTIOUS DISEASES | Facility: CLINIC | Age: 57
End: 2022-02-15
Payer: MEDICAID

## 2022-02-15 ENCOUNTER — OFFICE VISIT (OUTPATIENT)
Dept: TRANSPLANT | Facility: CLINIC | Age: 57
End: 2022-02-15
Payer: MEDICAID

## 2022-02-15 ENCOUNTER — SOCIAL WORK (OUTPATIENT)
Dept: TRANSPLANT | Facility: CLINIC | Age: 57
End: 2022-02-15
Payer: MEDICAID

## 2022-02-15 ENCOUNTER — LAB VISIT (OUTPATIENT)
Dept: LAB | Facility: HOSPITAL | Age: 57
End: 2022-02-15
Payer: MEDICAID

## 2022-02-15 ENCOUNTER — TELEPHONE (OUTPATIENT)
Dept: TRANSPLANT | Facility: CLINIC | Age: 57
End: 2022-02-15
Payer: MEDICAID

## 2022-02-15 VITALS
HEART RATE: 76 BPM | BODY MASS INDEX: 33.59 KG/M2 | DIASTOLIC BLOOD PRESSURE: 67 MMHG | SYSTOLIC BLOOD PRESSURE: 125 MMHG | WEIGHT: 209 LBS | HEIGHT: 66 IN

## 2022-02-15 DIAGNOSIS — N18.30 STAGE 3 CHRONIC KIDNEY DISEASE, UNSPECIFIED WHETHER STAGE 3A OR 3B CKD: ICD-10-CM

## 2022-02-15 DIAGNOSIS — J96.11 CHRONIC RESPIRATORY FAILURE WITH HYPOXIA AND HYPERCAPNIA: ICD-10-CM

## 2022-02-15 DIAGNOSIS — F32.A ANXIETY AND DEPRESSION: ICD-10-CM

## 2022-02-15 DIAGNOSIS — I51.7 LEFT VENTRICULAR HYPERTROPHY: Chronic | ICD-10-CM

## 2022-02-15 DIAGNOSIS — Z01.818 PRE-TRANSPLANT EVALUATION FOR HEART TRANSPLANT: ICD-10-CM

## 2022-02-15 DIAGNOSIS — E11.65 TYPE 2 DIABETES MELLITUS WITH HYPERGLYCEMIA, WITHOUT LONG-TERM CURRENT USE OF INSULIN: ICD-10-CM

## 2022-02-15 DIAGNOSIS — I42.8 NICM (NONISCHEMIC CARDIOMYOPATHY): Primary | ICD-10-CM

## 2022-02-15 DIAGNOSIS — E55.9 VITAMIN D DEFICIENCY: ICD-10-CM

## 2022-02-15 DIAGNOSIS — I50.42 CHRONIC COMBINED SYSTOLIC AND DIASTOLIC HEART FAILURE: ICD-10-CM

## 2022-02-15 DIAGNOSIS — E11.65 TYPE 2 DIABETES MELLITUS WITH HYPERGLYCEMIA, WITHOUT LONG-TERM CURRENT USE OF INSULIN: Primary | ICD-10-CM

## 2022-02-15 DIAGNOSIS — I42.8 NICM (NONISCHEMIC CARDIOMYOPATHY): Primary | Chronic | ICD-10-CM

## 2022-02-15 DIAGNOSIS — I50.43 ACUTE ON CHRONIC COMBINED SYSTOLIC AND DIASTOLIC CONGESTIVE HEART FAILURE: ICD-10-CM

## 2022-02-15 DIAGNOSIS — Z76.82 HEART TRANSPLANT CANDIDATE: ICD-10-CM

## 2022-02-15 DIAGNOSIS — D64.9 ANEMIA, UNSPECIFIED TYPE: ICD-10-CM

## 2022-02-15 DIAGNOSIS — J96.12 CHRONIC RESPIRATORY FAILURE WITH HYPOXIA AND HYPERCAPNIA: ICD-10-CM

## 2022-02-15 DIAGNOSIS — F41.9 ANXIETY AND DEPRESSION: ICD-10-CM

## 2022-02-15 DIAGNOSIS — I10 HYPERTENSION, ESSENTIAL: ICD-10-CM

## 2022-02-15 DIAGNOSIS — I10 ESSENTIAL HYPERTENSION: Chronic | ICD-10-CM

## 2022-02-15 LAB
25(OH)D3+25(OH)D2 SERPL-MCNC: 32 NG/ML (ref 30–96)
ANION GAP SERPL CALC-SCNC: 11 MMOL/L (ref 8–16)
BUN SERPL-MCNC: 59 MG/DL (ref 6–20)
CALCIUM SERPL-MCNC: 9.7 MG/DL (ref 8.7–10.5)
CHLORIDE SERPL-SCNC: 105 MMOL/L (ref 95–110)
CO2 SERPL-SCNC: 25 MMOL/L (ref 23–29)
CREAT SERPL-MCNC: 2.2 MG/DL (ref 0.5–1.4)
ERYTHROCYTE [DISTWIDTH] IN BLOOD BY AUTOMATED COUNT: 28.6 % (ref 11.5–14.5)
EST. GFR  (AFRICAN AMERICAN): 28.1 ML/MIN/1.73 M^2
EST. GFR  (NON AFRICAN AMERICAN): 24.3 ML/MIN/1.73 M^2
ESTIMATED AVG GLUCOSE: 117 MG/DL (ref 68–131)
FERRITIN SERPL-MCNC: 248 NG/ML (ref 20–300)
GLUCOSE SERPL-MCNC: 111 MG/DL (ref 70–110)
HBA1C MFR BLD: 5.7 % (ref 4–5.6)
HCT VFR BLD AUTO: 35.3 % (ref 37–48.5)
HGB BLD-MCNC: 10.1 G/DL (ref 12–16)
IRON SERPL-MCNC: 85 UG/DL (ref 30–160)
MAGNESIUM SERPL-MCNC: 1.9 MG/DL (ref 1.6–2.6)
MCH RBC QN AUTO: 17.7 PG (ref 27–31)
MCHC RBC AUTO-ENTMCNC: 28.6 G/DL (ref 32–36)
MCV RBC AUTO: 62 FL (ref 82–98)
PHOSPHATE SERPL-MCNC: 4.2 MG/DL (ref 2.7–4.5)
PLATELET # BLD AUTO: 163 K/UL (ref 150–450)
PMV BLD AUTO: ABNORMAL FL (ref 9.2–12.9)
POTASSIUM SERPL-SCNC: 4.2 MMOL/L (ref 3.5–5.1)
PTH-INTACT SERPL-MCNC: 270.7 PG/ML (ref 9–77)
RBC # BLD AUTO: 5.7 M/UL (ref 4–5.4)
SATURATED IRON: 28 % (ref 20–50)
SODIUM SERPL-SCNC: 141 MMOL/L (ref 136–145)
TOTAL IRON BINDING CAPACITY: 309 UG/DL (ref 250–450)
TRANSFERRIN SERPL-MCNC: 209 MG/DL (ref 200–375)
URATE SERPL-MCNC: 10 MG/DL (ref 2.4–5.7)
WBC # BLD AUTO: 5.51 K/UL (ref 3.9–12.7)

## 2022-02-15 PROCEDURE — 83880 ASSAY OF NATRIURETIC PEPTIDE: CPT | Mod: TXP | Performed by: INTERNAL MEDICINE

## 2022-02-15 PROCEDURE — 4010F PR ACE/ARB THEARPY RXD/TAKEN: ICD-10-PCS | Mod: CPTII,,, | Performed by: INTERNAL MEDICINE

## 2022-02-15 PROCEDURE — 3044F PR MOST RECENT HEMOGLOBIN A1C LEVEL <7.0%: ICD-10-PCS | Mod: CPTII,,, | Performed by: INTERNAL MEDICINE

## 2022-02-15 PROCEDURE — 85027 COMPLETE CBC AUTOMATED: CPT | Mod: TXP | Performed by: INTERNAL MEDICINE

## 2022-02-15 PROCEDURE — 83036 HEMOGLOBIN GLYCOSYLATED A1C: CPT | Mod: TXP | Performed by: INTERNAL MEDICINE

## 2022-02-15 PROCEDURE — 3078F DIAST BP <80 MM HG: CPT | Mod: CPTII,,, | Performed by: INTERNAL MEDICINE

## 2022-02-15 PROCEDURE — 99999 PR PBB SHADOW E&M-EST. PATIENT-LVL IV: ICD-10-PCS | Mod: PBBFAC,,, | Performed by: INTERNAL MEDICINE

## 2022-02-15 PROCEDURE — 86977 RBC SERUM PRETX INCUBJ/INHIB: CPT | Mod: PO,TXP | Performed by: INTERNAL MEDICINE

## 2022-02-15 PROCEDURE — 90750 HZV VACC RECOMBINANT IM: CPT | Mod: PBBFAC,TXP

## 2022-02-15 PROCEDURE — 3008F PR BODY MASS INDEX (BMI) DOCUMENTED: ICD-10-PCS | Mod: CPTII,,, | Performed by: INTERNAL MEDICINE

## 2022-02-15 PROCEDURE — 3008F BODY MASS INDEX DOCD: CPT | Mod: CPTII,,, | Performed by: INTERNAL MEDICINE

## 2022-02-15 PROCEDURE — 83970 ASSAY OF PARATHORMONE: CPT | Mod: TXP | Performed by: INTERNAL MEDICINE

## 2022-02-15 PROCEDURE — 82306 VITAMIN D 25 HYDROXY: CPT | Mod: TXP | Performed by: INTERNAL MEDICINE

## 2022-02-15 PROCEDURE — 90471 IMMUNIZATION ADMIN: CPT | Mod: PBBFAC,TXP

## 2022-02-15 PROCEDURE — 3074F SYST BP LT 130 MM HG: CPT | Mod: CPTII,,, | Performed by: INTERNAL MEDICINE

## 2022-02-15 PROCEDURE — 3074F PR MOST RECENT SYSTOLIC BLOOD PRESSURE < 130 MM HG: ICD-10-PCS | Mod: CPTII,,, | Performed by: INTERNAL MEDICINE

## 2022-02-15 PROCEDURE — 84550 ASSAY OF BLOOD/URIC ACID: CPT | Mod: TXP | Performed by: INTERNAL MEDICINE

## 2022-02-15 PROCEDURE — 99999 PR PBB SHADOW E&M-EST. PATIENT-LVL III: CPT | Mod: PBBFAC,TXP,,

## 2022-02-15 PROCEDURE — 83735 ASSAY OF MAGNESIUM: CPT | Mod: TXP | Performed by: INTERNAL MEDICINE

## 2022-02-15 PROCEDURE — 99999 PR PBB SHADOW E&M-EST. PATIENT-LVL III: ICD-10-PCS | Mod: PBBFAC,TXP,,

## 2022-02-15 PROCEDURE — 99213 OFFICE O/P EST LOW 20 MIN: CPT | Mod: PBBFAC,TXP

## 2022-02-15 PROCEDURE — 99999 PR PBB SHADOW E&M-EST. PATIENT-LVL IV: CPT | Mod: PBBFAC,,, | Performed by: INTERNAL MEDICINE

## 2022-02-15 PROCEDURE — 80048 BASIC METABOLIC PNL TOTAL CA: CPT | Mod: TXP | Performed by: INTERNAL MEDICINE

## 2022-02-15 PROCEDURE — 84466 ASSAY OF TRANSFERRIN: CPT | Mod: TXP | Performed by: INTERNAL MEDICINE

## 2022-02-15 PROCEDURE — 86832 HLA CLASS I HIGH DEFIN QUAL: CPT | Mod: PO,TXP | Performed by: INTERNAL MEDICINE

## 2022-02-15 PROCEDURE — 3044F HG A1C LEVEL LT 7.0%: CPT | Mod: CPTII,,, | Performed by: INTERNAL MEDICINE

## 2022-02-15 PROCEDURE — 4010F ACE/ARB THERAPY RXD/TAKEN: CPT | Mod: CPTII,,, | Performed by: INTERNAL MEDICINE

## 2022-02-15 PROCEDURE — 84100 ASSAY OF PHOSPHORUS: CPT | Mod: TXP | Performed by: INTERNAL MEDICINE

## 2022-02-15 PROCEDURE — 3078F PR MOST RECENT DIASTOLIC BLOOD PRESSURE < 80 MM HG: ICD-10-PCS | Mod: CPTII,,, | Performed by: INTERNAL MEDICINE

## 2022-02-15 PROCEDURE — 99213 OFFICE O/P EST LOW 20 MIN: CPT | Mod: S$PBB,,, | Performed by: INTERNAL MEDICINE

## 2022-02-15 PROCEDURE — 99214 OFFICE O/P EST MOD 30 MIN: CPT | Mod: PBBFAC,27 | Performed by: INTERNAL MEDICINE

## 2022-02-15 PROCEDURE — 99213 PR OFFICE/OUTPT VISIT, EST, LEVL III, 20-29 MIN: ICD-10-PCS | Mod: S$PBB,,, | Performed by: INTERNAL MEDICINE

## 2022-02-15 PROCEDURE — 86833 HLA CLASS II HIGH DEFIN QUAL: CPT | Mod: PO,TXP | Performed by: INTERNAL MEDICINE

## 2022-02-15 PROCEDURE — 82728 ASSAY OF FERRITIN: CPT | Mod: TXP | Performed by: INTERNAL MEDICINE

## 2022-02-15 NOTE — PROGRESS NOTES
Left Ventricular Assist Device (LVAD) and Transplant Recipient Adult Psychosocial Assessment *Caregiver Update*      Encounter Date: Caregiver Update on 2/15/22 due to caregiver(s) changes to previous psychosocial completed by Kathy Marquez LCSW on 12/3/21.    Hafsa Chandra [facility] [id number]     Telephone Information:   Mobile [phone]   Work  There is no work phone number on file.  E-mail  [email or web address]    Sex: female  YOB: 1965  Age: 56 y.o.    [place] Citizen: yes  Primary Language: English   Needed: no    Emergency Contact:  Name: Erika Estrada (Primary Caregiver)   Relationship: daughter  Address: [address] [place] [address]. [address]  Phone Numbers: [phone]    Do you and your caregivers have access to reliable transportation? yes     PRIMARY CAREGIVER: Erika Estrada,32, pt's daughter, delmy, will be primary caregiver, phone number [phone]. Has minor children who will be watched by her cousin or other member of family. Pt and daugther state that she will stay at her daughters home post surgery.    [address] [place]. [address]     provided in-depth information to Patient and Caregiver regarding  regarding pre- and post-LVAD and pre- and post-transplant caregiver role.   strongly encourages Patient and Caregiver to have concrete plan regarding post-transplant care giving, including back-up caregiver(s) to ensure care giving needs are met as needed.    Caregiver states understanding all aspects of caregiver role/commitment and is able/willing/committed to being caregiver to the fullest extent necessary. .      Patient verbalizes understanding of the education provided today and caregiver responsibilities.       remains available. Patient and Caregiver agree to contact  in a timely manner if concerns arise.      Able to take time off work without financial concerns: yes.      Additional Significant Others who will Assist with LVAD/Transplant:  Name: Beulah Hawley   Age: 41  City/State: [place], [place]  Relationship: Niece   Does person drive? yes   Work: Yes, works part time (4 hours per day) but able to take 4-6 weeks off from work to take care of Pt.   Phone: [phone]    Able to afford all costs now and if transplanted or receives LVAD, including medications: yes  Pt reports secure power source? yes  Pt reports ability to afford monthly electric bill? yes. Pt has had concerns in the past (most reascent Dec 2021) that she would not be able to afford electric bill.   Pt reports ability to afford LVAD dressing supplies? yes     Patient and Caregiver verbalizes understanding of personal responsibilities related to LVAD and transplant costs and the importance of having a financial plan to ensure that patients LVAD and transplant costs are fully covered.       provided fundraising information/education.  Patient and Caregiver verbalizes understanding.   remains available.    Knowledge: Patient and Caregiver states having clear understanding and realistic expectations regarding the potential risks and potential benefits LVAD implantation and organ transplantation and organ donation and agrees to discuss with health care team members and support system members, as well as to utilize available resources and express questions and/or concerns in order to further facilitate the pt informed decision-making.  Resources and information provided and reviewed.     Patient and Caregiver is aware of [facility]'s affiliation and/or partnership with agencies in home health care, LTAC, SNF, [facility], and other hospitals and clinics.    Understanding: Patient and Caregiver reports having a clear understanding of the many lifetime commitments involved with being an LVAD and transplant recipient, including costs, compliance, medications, lab work, procedures, appointments, concrete and  financial planning, preparedness, timely and appropriate communication of concerns, abstinence (ETOH, tobacco, illicit non-prescribed drugs), adherence to all health care team recommendations, support system and caregiver involvement, appropriate and timely resource utilization and follow-through, mental health counseling as needed/recommended, and patient and caregiver responsibilities.  Social Service Handbook, resources and detailed educational information provided and reviewed.  Educational information provided.    Patient and Caregiver also reports current and expected compliance with health care regime and states having a clear understanding of the importance of compliance.       Patient and Caregiver reports a clear understanding that risks and benefits may be involved with LVAD heart failure treatment and organ transplantation and with organ donation.     Patient and Caregiver also reports clear understanding that psychosocial risk factors may affect patient, and include but are not limited to feelings of depression, generalized anxiety, anxiety regarding dependence on others, post traumatic stress disorder, feelings of guilt and other emotional and/or mental concerns, and/or exacerbation of existing mental health concerns.  Detailed resources provided and discussed.      Patient and Caregiver agrees to access appropriate resources in a timely manner as needed and/or as recommended, and to communicate concerns appropriately.     Patient and Caregiver also reports a clear understanding of treatment options available.      Feelings or Concerns: Pt is excited about advanced work up options.       Interview Behavior: Patient and Caregivers present as alert and oriented x 4, pleasant, good eye contact, calm, communicative, cooperative, and asking and answering questions appropriately.         Transplant Social Work - Candidacy  Assessment/Plan:     Psychosocial Suitability: Patient presents as a suitable  candidate for LVAD or transplant at this time. Based on psychosocial risk factors, patient presents as medium risk, due to reported non-adherence, limited income, difficulty paying power bill per previous psychosocial completed by Kathy Marquez LCSW on 12/3/2021.    Recommendations/Additional Comments:   - Discussed [facility] apts - discussed need to stay locally for 4-6 weeks post -OHT and discussed rules of No Pets, No children at [employer]  - Encouraged Pt & caregivers to consider fundraising for OHT. Education provided on discussing setting up fundraising account with bank to protect gifts/donations.    Transplant committee to determine final decision regarding transplant eligibility.  Barak Bianchi LCSW

## 2022-02-15 NOTE — PROGRESS NOTES
Patient received 2 vaccines IM to the right deltoid, 2nd Heplisav B anterior, and 2nd Shingrix.  Tolerated well and left in NAD

## 2022-02-15 NOTE — PROGRESS NOTES
Subjective:     Patient ID:  Hafsa Hawley is a 56 y.o. female who presents for follow-up of Congestive Heart Failure    HPI:  56-year-old black female recently discharged was referred back to this clinic for continuation of evaluation for advanced options.  She reports that she cannot walk more than 100 ft even on a good day and time she cannot walk across the room.  She is supposed to be using oxygen 24/7 but admits that she does not always use it.  She sleeps on 2 pillows.  No definite PND.  No symptomatic arrhythmia.  It is not clear why she is hypoxic requiring chronic home oxygen.  She had pulmonary function test today but in the past they were not that remarkable. Patient is less frightened now after her admission of advanced options. Feels well overall, working on evaluation for advanced options. Had PFTs that were concerning for undergoing cardiothoracic surgery.   NYHA FC IIIb    Review of Systems   Constitutional: Positive for weight gain. Negative for chills, decreased appetite, diaphoresis, fever, malaise/fatigue and weight loss.   HENT: Negative for congestion.    Eyes: Negative for blurred vision and visual disturbance.   Cardiovascular: Positive for dyspnea on exertion, leg swelling, orthopnea and paroxysmal nocturnal dyspnea. Negative for chest pain, irregular heartbeat, near-syncope, palpitations and syncope.   Respiratory: Positive for cough and shortness of breath. Negative for sleep disturbances due to breathing, snoring and wheezing.    Hematologic/Lymphatic: Negative for bleeding problem. Does not bruise/bleed easily.   Skin: Negative for poor wound healing and rash.   Musculoskeletal: Negative for arthritis, joint pain and muscle weakness.   Gastrointestinal: Negative for bloating, abdominal pain, anorexia, constipation, diarrhea, hematemesis, hematochezia, melena, nausea and vomiting.   Genitourinary: Negative for frequency and urgency.   Neurological: Negative for difficulty with  "concentration, excessive daytime sleepiness, dizziness, headaches, light-headedness and weakness.   Psychiatric/Behavioral: Positive for depression. The patient is nervous/anxious. The patient does not have insomnia.         Objective:   Physical Exam  Constitutional:       General: She is not in acute distress.     Appearance: She is well-developed. She is obese. She is not ill-appearing, toxic-appearing or diaphoretic.      Comments: /67 (BP Location: Left arm, Patient Position: Sitting, BP Method: Large (Automatic))   Pulse 76   Ht 5' 6" (1.676 m)   Wt 94.8 kg (208 lb 15.9 oz)   LMP  (LMP Unknown)   BMI 33.73 kg/m²    HENT:      Head: Normocephalic and atraumatic.   Eyes:      General: No scleral icterus.        Right eye: No discharge.         Left eye: No discharge.      Conjunctiva/sclera: Conjunctivae normal.   Neck:      Thyroid: No thyromegaly.      Vascular: No JVD.      Trachea: No tracheal deviation.   Cardiovascular:      Rate and Rhythm: Normal rate and regular rhythm.      Heart sounds: Normal heart sounds. No murmur heard.    No gallop.   Pulmonary:      Effort: Pulmonary effort is normal.      Breath sounds: Normal breath sounds.   Abdominal:      General: Bowel sounds are normal. There is no distension.      Palpations: Abdomen is soft. There is no mass.      Tenderness: There is no abdominal tenderness. There is no guarding or rebound.   Musculoskeletal:         General: No tenderness.      Right lower leg: No edema.      Left lower leg: No edema.   Skin:     General: Skin is warm and dry.   Neurological:      General: No focal deficit present.      Mental Status: She is alert and oriented to person, place, and time. Mental status is at baseline.        Echocardiogram 11/30/2021 Summary  · The left ventricle is severely enlarged with eccentric hypertrophy and severely decreased systolic function. The estimated ejection fraction is 25%.  · There is severe left ventricular global " hypokinesis.  · Mild right ventricular enlargement with moderately reduced right ventricular systolic function.  · Grade II left ventricular diastolic dysfunction.  · Biatrial enlargement.  · The estimated PA systolic pressure is 59 mmHg.  · Elevated central venous pressure (15 mmHg).  · The ascending aorta is mildly dilated.  · Small posterolateral pericardial effusion.  THOUGH NOT REPORTED THE VITAL ACCORDING TO THE TABLE ARE SIGNIFICANTLY THICKENED    12/05/2021 EKG NORMAL SINUS RHYTHM, LEFT AXIS DEVIATION (MARKED), LEFT VENTRICULAR HYPERTROPHY, LEFT ANTERIOR HEMIBLOCK, LEFT ATRIAL ENLARGEMENT    Assessment:     1. NICM (nonischemic cardiomyopathy)    2. Left ventricular hypertrophy    3. Essential hypertension    4. Chronic respiratory failure with hypoxia and hypercapnia    5. Anxiety and depression      Plan:   Recommend 2 gram sodium restriction and 1500cc fluid restriction.  Encourage physical activity with graded exercise program.  Requested patient to weigh themselves daily, and to notify us if their weight increases by more than 3 lbs in 1 day or 5 lbs in 1 week.  Continue current regimen with plan to followup with selection presentation. Concerned she is not a candidate for surgery due to lung exam.   RTC after presented at selection.   Mood seems more appropriate than what we have seen in the past which is good to see.

## 2022-02-15 NOTE — TELEPHONE ENCOUNTER
Spoke to pt--she would like for you to send in test strips for Accu check glucometer please, thanks. She tests 3 x day.

## 2022-02-15 NOTE — LETTER
March 25, 2022        Benson Cortez  [address] [place] [address]  Phone: [phone]  Fax: [phone]             [id number]  [address]  [place] [address]  Phone: [phone]   Patient: Hafsa Hawley   MR Number: [id number]   YOB: 1965   Date of Visit: 2/15/2022       Dear Dr. Benson Cortez    Thank you for referring Hafsa Hawley to me for evaluation. Attached you will find relevant portions of my assessment and plan of care.    If you have questions, please do not hesitate to call me. I look forward to following Hafsa Hawley along with you.    Sincerely,    Bertha Lorenzo MD    Enclosure    If you would like to receive this communication electronically, please contact [email or web address] or [phone] to request [employer] access.    [employer] is a tool which provides read-only access to select patient information with whom you have a relationship. Its easy to use and provides real time access to review your patients record including encounter summaries, notes, results, and demographic information.    If you feel you have received this communication in error or would no longer like to receive these types of communications, please e-mail [email or web address]

## 2022-02-15 NOTE — TELEPHONE ENCOUNTER
----- Message from Miriam Castro sent at 2/15/2022  1:14 PM CST -----  Contact: self  Hafsa Hawley  MRN: [id number]  : 1965  PCP: Cristobal Ann  Home Phone      [phone]  Work Phone      Not on file.  Mobile          [phone]  Home Phone      [phone]      MESSAGE:   PT states ins will not cover her test strips anymore and wondering if there is an alternative that her insurance does cover.    blood sugar diagnostic (ACCU-CHEK GUIDE TEST STRIPS) [facility]      [phone]

## 2022-02-16 ENCOUNTER — PATIENT MESSAGE (OUTPATIENT)
Dept: ADMINISTRATIVE | Facility: HOSPITAL | Age: 57
End: 2022-02-16
Payer: MEDICAID

## 2022-02-16 PROBLEM — J96.12 CHRONIC RESPIRATORY FAILURE WITH HYPOXIA AND HYPERCAPNIA: Status: ACTIVE | Noted: 2021-03-16

## 2022-02-16 LAB — NT-PROBNP SERPL-MCNC: 1824 PG/ML

## 2022-02-18 ENCOUNTER — TELEPHONE (OUTPATIENT)
Dept: CARDIOTHORACIC SURGERY | Facility: CLINIC | Age: 57
End: 2022-02-18
Payer: MEDICAID

## 2022-02-18 NOTE — TELEPHONE ENCOUNTER
Called and confirmed pt's appt for 2/21 with pt, including appt time and location, which pt verbalized understanding to.

## 2022-02-21 ENCOUNTER — TELEPHONE (OUTPATIENT)
Dept: TRANSPLANT | Facility: CLINIC | Age: 57
End: 2022-02-21
Payer: MEDICAID

## 2022-02-21 ENCOUNTER — TELEPHONE (OUTPATIENT)
Dept: CARDIOTHORACIC SURGERY | Facility: CLINIC | Age: 57
End: 2022-02-21
Payer: MEDICAID

## 2022-02-21 NOTE — TELEPHONE ENCOUNTER
Received a message that pt is unable to present to her appt to see Dr. Chacko today, due to transportation.  Called and rescheduled pt to see Dr. Chacko on 2/28.  Appt slip mailed to pt.

## 2022-02-22 ENCOUNTER — PATIENT MESSAGE (OUTPATIENT)
Dept: TRANSPLANT | Facility: CLINIC | Age: 57
End: 2022-02-22
Payer: MEDICAID

## 2022-02-23 ENCOUNTER — OFFICE VISIT (OUTPATIENT)
Dept: HEMATOLOGY/ONCOLOGY | Facility: CLINIC | Age: 57
End: 2022-02-23
Payer: MEDICAID

## 2022-02-23 ENCOUNTER — DOCUMENTATION ONLY (OUTPATIENT)
Dept: TRANSFUSION MEDICINE | Facility: HOSPITAL | Age: 57
End: 2022-02-23
Payer: MEDICAID

## 2022-02-23 VITALS
HEART RATE: 89 BPM | DIASTOLIC BLOOD PRESSURE: 67 MMHG | BODY MASS INDEX: 32.54 KG/M2 | OXYGEN SATURATION: 97 % | HEIGHT: 66 IN | RESPIRATION RATE: 18 BRPM | SYSTOLIC BLOOD PRESSURE: 110 MMHG | WEIGHT: 202.5 LBS

## 2022-02-23 DIAGNOSIS — R76.8 ELEVATED SERUM IMMUNOGLOBULIN FREE LIGHT CHAINS: ICD-10-CM

## 2022-02-23 PROCEDURE — 99203 PR OFFICE/OUTPT VISIT, NEW, LEVL III, 30-44 MIN: ICD-10-PCS | Mod: S$PBB,TXP,, | Performed by: INTERNAL MEDICINE

## 2022-02-23 PROCEDURE — 99999 PR PBB SHADOW E&M-EST. PATIENT-LVL V: CPT | Mod: PBBFAC,TXP,, | Performed by: INTERNAL MEDICINE

## 2022-02-23 PROCEDURE — 3044F PR MOST RECENT HEMOGLOBIN A1C LEVEL <7.0%: ICD-10-PCS | Mod: CPTII,TXP,, | Performed by: INTERNAL MEDICINE

## 2022-02-23 PROCEDURE — 1159F MED LIST DOCD IN RCRD: CPT | Mod: CPTII,TXP,, | Performed by: INTERNAL MEDICINE

## 2022-02-23 PROCEDURE — 99203 OFFICE O/P NEW LOW 30 MIN: CPT | Mod: S$PBB,TXP,, | Performed by: INTERNAL MEDICINE

## 2022-02-23 PROCEDURE — 3074F SYST BP LT 130 MM HG: CPT | Mod: CPTII,TXP,, | Performed by: INTERNAL MEDICINE

## 2022-02-23 PROCEDURE — 3078F PR MOST RECENT DIASTOLIC BLOOD PRESSURE < 80 MM HG: ICD-10-PCS | Mod: CPTII,TXP,, | Performed by: INTERNAL MEDICINE

## 2022-02-23 PROCEDURE — 4010F ACE/ARB THERAPY RXD/TAKEN: CPT | Mod: CPTII,TXP,, | Performed by: INTERNAL MEDICINE

## 2022-02-23 PROCEDURE — 99999 PR PBB SHADOW E&M-EST. PATIENT-LVL V: ICD-10-PCS | Mod: PBBFAC,TXP,, | Performed by: INTERNAL MEDICINE

## 2022-02-23 PROCEDURE — 3078F DIAST BP <80 MM HG: CPT | Mod: CPTII,TXP,, | Performed by: INTERNAL MEDICINE

## 2022-02-23 PROCEDURE — 99215 OFFICE O/P EST HI 40 MIN: CPT | Mod: PBBFAC,PN,TXP | Performed by: INTERNAL MEDICINE

## 2022-02-23 PROCEDURE — 1159F PR MEDICATION LIST DOCUMENTED IN MEDICAL RECORD: ICD-10-PCS | Mod: CPTII,TXP,, | Performed by: INTERNAL MEDICINE

## 2022-02-23 PROCEDURE — 3008F PR BODY MASS INDEX (BMI) DOCUMENTED: ICD-10-PCS | Mod: CPTII,TXP,, | Performed by: INTERNAL MEDICINE

## 2022-02-23 PROCEDURE — 3044F HG A1C LEVEL LT 7.0%: CPT | Mod: CPTII,TXP,, | Performed by: INTERNAL MEDICINE

## 2022-02-23 PROCEDURE — 3074F PR MOST RECENT SYSTOLIC BLOOD PRESSURE < 130 MM HG: ICD-10-PCS | Mod: CPTII,TXP,, | Performed by: INTERNAL MEDICINE

## 2022-02-23 PROCEDURE — 3008F BODY MASS INDEX DOCD: CPT | Mod: CPTII,TXP,, | Performed by: INTERNAL MEDICINE

## 2022-02-23 PROCEDURE — 4010F PR ACE/ARB THEARPY RXD/TAKEN: ICD-10-PCS | Mod: CPTII,TXP,, | Performed by: INTERNAL MEDICINE

## 2022-02-23 NOTE — PROGRESS NOTES
"  PATIENT: Hafsa Hawley  MRN: [id number]  DATE: 2/23/2022    Chief Complaint: Abnormal Lab      Subjective:     History of Present Illness:     Found to have elevated kappa and lambda free light chains and referred here for further eval    Co-morbidities include heart failure, CKD, has ICD in place    Also has chronic anemia, thalassemia trait    Past Medical, Surgical, Family and Social History Reviewed.    Medications and Allergies reviewed      Review of Systems   Constitutional: Positive for fatigue. Negative for fever and unexpected weight change.   Neurological: Positive for weakness.       Objective:     Vitals:    02/23/22 1315   BP: 110/67   BP Location: Left arm   Patient Position: Sitting   BP Method: Large (Automatic)   Pulse: 89   Resp: 18   SpO2: 97%   Weight: 91.8 kg (202 lb 7.9 oz)   Height: 5' 6" (1.676 m)       BMI: Body mass index is 32.68 kg/m².    Physical Exam  Vitals and nursing note reviewed.   Constitutional:       General: She is not in acute distress.  Pulmonary:      Effort: Pulmonary effort is normal.      Breath sounds: Normal breath sounds.   Neurological:      Mental Status: She is alert. Mental status is at baseline.         Assessment:       1. Elevated serum immunoglobulin free light chains        Plan:   She has mild elevation in kappa and lambda free light chains but normal free light chain ratio. This is secondary to underlying CKD. Also no monoclonal peaks identified on PRICILA. She hence does not have MGUS. No further work-up is needed. Reassured her and answered all questions.    RTC prn      "

## 2022-02-23 NOTE — PROGRESS NOTES
[facility]  Section of Transfusion Medicine and Histocompatibility  HLA Note    Case Details   Diagnosis:  No primary diagnosis found.  Blood Type: B POS  HLA Type:   Class I:  Lab Results   Component Value Date    HBXH9UN 2 12/03/2021    KKBI1MY 26 12/03/2021    ZSUD8WY 63 12/03/2021    MNWI6KV 72 12/03/2021    FWBCB9NQ 4 12/03/2021    VAGLO3JS 6 12/03/2021    GQCDI0GC 2 12/03/2021    XUTTC6ID 14 12/03/2021     Class II:  Lab Results   Component Value Date    JGYIYG11MX 1 12/03/2021    JEPAKR61BF 17 12/03/2021    UBJNSH992GP 52 12/03/2021    XFCCRU2533 XX 12/03/2021    NCFIA2UR 2 12/03/2021    MIQSM0QW 5 12/03/2021     Recent Antibody Screen/ID Results:   Lab Results   Component Value Date    YX5UWKI B76,B45,A1,B44,A23,B82,A24 12/22/2021    CIABCLM  12/22/2021     B76(41077), B45(91138), A1(34604), B44(73459), A23(02025), B82(04515), A24(61305)    CIIAB Negative 12/22/2021     Auto T Cell Crossmatch Results:  Lab Results   Component Value Date    XMTCELLRES Negative 12/03/2021     Auto B Cell Crossmatch Results:  Lab Results   Component Value Date    BCELLRES Negative 12/03/2021     Assessment     Interpretation: This patient is immunized to HLA. A virtual crossmatch is recommended.    Strongly Recommended Unacceptable Antigens: A1, 23, 24; B44, 45, 76, 82    Optional Unacceptable Antigens: None    Crossmatch Expectations: (Given strongly recommended unacceptable antigens) A retrospective or prospective flow cytometric crossmatch is expected to be negative.    Please call the HLA Lab [phone] with any concerns or questions.      MARK Fournier MD, AILEEN  Section of Transfusion Medicine & Histocompatibility  Department of Pathology and Laboratory Medicine  [facility]  02/23/2022

## 2022-02-24 ENCOUNTER — TELEPHONE (OUTPATIENT)
Dept: FAMILY MEDICINE | Facility: CLINIC | Age: 57
End: 2022-02-24
Payer: MEDICAID

## 2022-02-24 DIAGNOSIS — E11.65 TYPE 2 DIABETES MELLITUS WITH HYPERGLYCEMIA, WITHOUT LONG-TERM CURRENT USE OF INSULIN: ICD-10-CM

## 2022-02-24 RX ORDER — LANCETS
1 EACH MISCELLANEOUS 3 TIMES DAILY
Qty: 100 EACH | Refills: 11 | Status: SHIPPED | OUTPATIENT
Start: 2022-02-24 | End: 2022-03-23 | Stop reason: SDUPTHER

## 2022-02-27 DIAGNOSIS — K21.9 GASTROESOPHAGEAL REFLUX DISEASE, UNSPECIFIED WHETHER ESOPHAGITIS PRESENT: ICD-10-CM

## 2022-02-27 NOTE — TELEPHONE ENCOUNTER
No new care gaps identified.  Powered by [employer]. Reference number: [id number].   2/27/2022 4:47:21 PM CST

## 2022-02-28 ENCOUNTER — OFFICE VISIT (OUTPATIENT)
Dept: CARDIOTHORACIC SURGERY | Facility: CLINIC | Age: 57
End: 2022-02-28
Payer: MEDICAID

## 2022-02-28 VITALS
SYSTOLIC BLOOD PRESSURE: 135 MMHG | HEIGHT: 66 IN | BODY MASS INDEX: 33.07 KG/M2 | HEART RATE: 53 BPM | WEIGHT: 205.81 LBS | OXYGEN SATURATION: 93 % | DIASTOLIC BLOOD PRESSURE: 80 MMHG | TEMPERATURE: 98 F

## 2022-02-28 DIAGNOSIS — I50.42 CHRONIC COMBINED SYSTOLIC AND DIASTOLIC HEART FAILURE: Primary | ICD-10-CM

## 2022-02-28 PROCEDURE — 99215 OFFICE O/P EST HI 40 MIN: CPT | Mod: S$PBB,,, | Performed by: THORACIC SURGERY (CARDIOTHORACIC VASCULAR SURGERY)

## 2022-02-28 PROCEDURE — 1160F RVW MEDS BY RX/DR IN RCRD: CPT | Mod: CPTII,,, | Performed by: THORACIC SURGERY (CARDIOTHORACIC VASCULAR SURGERY)

## 2022-02-28 PROCEDURE — 3008F PR BODY MASS INDEX (BMI) DOCUMENTED: ICD-10-PCS | Mod: CPTII,,, | Performed by: THORACIC SURGERY (CARDIOTHORACIC VASCULAR SURGERY)

## 2022-02-28 PROCEDURE — 3008F BODY MASS INDEX DOCD: CPT | Mod: CPTII,,, | Performed by: THORACIC SURGERY (CARDIOTHORACIC VASCULAR SURGERY)

## 2022-02-28 PROCEDURE — 3044F PR MOST RECENT HEMOGLOBIN A1C LEVEL <7.0%: ICD-10-PCS | Mod: CPTII,,, | Performed by: THORACIC SURGERY (CARDIOTHORACIC VASCULAR SURGERY)

## 2022-02-28 PROCEDURE — 1159F PR MEDICATION LIST DOCUMENTED IN MEDICAL RECORD: ICD-10-PCS | Mod: CPTII,,, | Performed by: THORACIC SURGERY (CARDIOTHORACIC VASCULAR SURGERY)

## 2022-02-28 PROCEDURE — 3044F HG A1C LEVEL LT 7.0%: CPT | Mod: CPTII,,, | Performed by: THORACIC SURGERY (CARDIOTHORACIC VASCULAR SURGERY)

## 2022-02-28 PROCEDURE — 99215 OFFICE O/P EST HI 40 MIN: CPT | Mod: PBBFAC | Performed by: THORACIC SURGERY (CARDIOTHORACIC VASCULAR SURGERY)

## 2022-02-28 PROCEDURE — 1159F MED LIST DOCD IN RCRD: CPT | Mod: CPTII,,, | Performed by: THORACIC SURGERY (CARDIOTHORACIC VASCULAR SURGERY)

## 2022-02-28 PROCEDURE — 3075F PR MOST RECENT SYSTOLIC BLOOD PRESS GE 130-139MM HG: ICD-10-PCS | Mod: CPTII,,, | Performed by: THORACIC SURGERY (CARDIOTHORACIC VASCULAR SURGERY)

## 2022-02-28 PROCEDURE — 4010F PR ACE/ARB THEARPY RXD/TAKEN: ICD-10-PCS | Mod: CPTII,,, | Performed by: THORACIC SURGERY (CARDIOTHORACIC VASCULAR SURGERY)

## 2022-02-28 PROCEDURE — 1160F PR REVIEW ALL MEDS BY PRESCRIBER/CLIN PHARMACIST DOCUMENTED: ICD-10-PCS | Mod: CPTII,,, | Performed by: THORACIC SURGERY (CARDIOTHORACIC VASCULAR SURGERY)

## 2022-02-28 PROCEDURE — 99999 PR PBB SHADOW E&M-EST. PATIENT-LVL V: ICD-10-PCS | Mod: PBBFAC,,, | Performed by: THORACIC SURGERY (CARDIOTHORACIC VASCULAR SURGERY)

## 2022-02-28 PROCEDURE — 3075F SYST BP GE 130 - 139MM HG: CPT | Mod: CPTII,,, | Performed by: THORACIC SURGERY (CARDIOTHORACIC VASCULAR SURGERY)

## 2022-02-28 PROCEDURE — 99215 PR OFFICE/OUTPT VISIT, EST, LEVL V, 40-54 MIN: ICD-10-PCS | Mod: S$PBB,,, | Performed by: THORACIC SURGERY (CARDIOTHORACIC VASCULAR SURGERY)

## 2022-02-28 PROCEDURE — 3079F PR MOST RECENT DIASTOLIC BLOOD PRESSURE 80-89 MM HG: ICD-10-PCS | Mod: CPTII,,, | Performed by: THORACIC SURGERY (CARDIOTHORACIC VASCULAR SURGERY)

## 2022-02-28 PROCEDURE — 3079F DIAST BP 80-89 MM HG: CPT | Mod: CPTII,,, | Performed by: THORACIC SURGERY (CARDIOTHORACIC VASCULAR SURGERY)

## 2022-02-28 PROCEDURE — 4010F ACE/ARB THERAPY RXD/TAKEN: CPT | Mod: CPTII,,, | Performed by: THORACIC SURGERY (CARDIOTHORACIC VASCULAR SURGERY)

## 2022-02-28 PROCEDURE — 99999 PR PBB SHADOW E&M-EST. PATIENT-LVL V: CPT | Mod: PBBFAC,,, | Performed by: THORACIC SURGERY (CARDIOTHORACIC VASCULAR SURGERY)

## 2022-02-28 NOTE — PROGRESS NOTES
Subjective:      Patient ID: Hafsa Hawley is a 56 y.o. female.    Chief Complaint: No chief complaint on file.      HPI:  Hafsa Hawley is a 56 y.o. female who presents to discuss advanced options related to her heart failure.  She has a medical history significant for COPD, hypertension, NICM, CKD, and DM.  She reports difficulty walking more than 100 ft.  She is supposed to be using oxygen 24/7 but admits that she does not always use it.  She sleeps on 2 pillows.  She reports that she thought she was coming in to get a surgical date for a LVAD and was very upset after hearing her options today.  She is emotional and crying while in the room.  She denies previous sternotomies or use of assistive device for ambulation.     Current medications Reviewed    Review of Systems   Constitutional: Negative for activity change, appetite change, fatigue and fever.   HENT: Negative for nosebleeds.    Respiratory: Positive for shortness of breath. Negative for cough.    Cardiovascular: Negative for chest pain, palpitations and leg swelling.   Gastrointestinal: Negative for abdominal distention, abdominal pain and nausea.   Genitourinary: Negative for frequency.   Musculoskeletal: Negative for arthralgias and myalgias.   Skin: Negative for rash.   Neurological: Negative for dizziness and numbness.   Hematological: Does not bruise/bleed easily.     Objective:   Physical Exam  Constitutional:       Appearance: She is obese.   HENT:      Head: Normocephalic and atraumatic.   Eyes:      Extraocular Movements: Extraocular movements intact.   Cardiovascular:      Rate and Rhythm: Normal rate.   Pulmonary:      Effort: Pulmonary effort is normal.   Abdominal:      General: Abdomen is flat.      Palpations: Abdomen is soft.   Musculoskeletal:         General: Normal range of motion.      Cervical back: Normal range of motion.   Skin:     General: Skin is warm and dry.      Capillary Refill: Capillary refill takes less than 2  seconds.   Neurological:      General: No focal deficit present.       Diagnotic Results: Reviewed  CPX  · Severe functional impairment associated with a normal breathing reserve, normal oxygen stauration, poor effort, and a reduced AT. These findings are indicative of functional impairment secondary to poor effort (stopped because of foot pain).  · There were no arrhythmias during stress.  · There was no ST segment deviation noted during stress.  · The ECG portion of this study is negative for myocardial ischemia.    ECHO  · The left ventricle is severely enlarged with eccentric hypertrophy and severely decreased systolic function. The estimated ejection fraction is 25%.  · There is severe left ventricular global hypokinesis.  · Mild right ventricular enlargement with moderately reduced right ventricular systolic function.  · Grade II left ventricular diastolic dysfunction.  · Biatrial enlargement.  · The estimated PA systolic pressure is 59 mmHg.  · Elevated central venous pressure (15 mmHg).  · The ascending aorta is mildly dilated.  · Small posterolateral pericardial effusion.  · LVIDD 6.86  · TAPSE 1.9    RHC  · The estimated blood loss was <50 mL.  · RHC performed via the right IJ. Patient tolerated the procedure well. Upper normal left and normal right side filling pressures (RA=6 mm of Hg, PCWP=15 mm of Hg). Mild pulmonary HTN (PA=51/17 mm of Hg, PA mean=32 mm of Hg). Low cardiac index and output (CI=1.9 L/mion/m2, CO=3.8 L/min) off inotropes    CT CAP  Impression:  Stable cardiomegaly with small volume pericardial effusion.  Stable right adrenal nodule dating back to at least 2018.  Stable nonspecific prominent retroperitoneal lymph nodes and adjacent fat stranding.  Additional stable findings as above.  Assessment:   1. HFrEF  Plan:   Okay to proceed, discussed the need for heart/kidney eval.

## 2022-03-03 ENCOUNTER — TELEPHONE (OUTPATIENT)
Dept: TRANSPLANT | Facility: CLINIC | Age: 57
End: 2022-03-03
Payer: MEDICAID

## 2022-03-03 NOTE — TELEPHONE ENCOUNTER
I spoke with pt- says she is very upset about her discussion with Dr Chacko regarding needing a heart and kidney transplant.  She said she is coming to her appts as scheduled tomorrow.

## 2022-03-04 ENCOUNTER — OFFICE VISIT (OUTPATIENT)
Dept: TRANSPLANT | Facility: CLINIC | Age: 57
End: 2022-03-04
Attending: INTERNAL MEDICINE
Payer: MEDICAID

## 2022-03-04 ENCOUNTER — LAB VISIT (OUTPATIENT)
Dept: LAB | Facility: HOSPITAL | Age: 57
End: 2022-03-04
Attending: INTERNAL MEDICINE
Payer: MEDICAID

## 2022-03-04 VITALS
HEIGHT: 66 IN | DIASTOLIC BLOOD PRESSURE: 66 MMHG | WEIGHT: 204.13 LBS | SYSTOLIC BLOOD PRESSURE: 122 MMHG | BODY MASS INDEX: 32.81 KG/M2 | HEART RATE: 69 BPM

## 2022-03-04 DIAGNOSIS — I42.8 NICM (NONISCHEMIC CARDIOMYOPATHY): Chronic | ICD-10-CM

## 2022-03-04 DIAGNOSIS — Z76.82 HEART TRANSPLANT CANDIDATE: ICD-10-CM

## 2022-03-04 DIAGNOSIS — Z76.82 HEART TRANSPLANT CANDIDATE: Primary | ICD-10-CM

## 2022-03-04 DIAGNOSIS — I13.0 HYPERTENSIVE CARDIOVASCULAR-RENAL DISEASE, STAGE 1-4 OR UNSPECIFIED CHRONIC KIDNEY DISEASE, WITH HEART FAILURE: ICD-10-CM

## 2022-03-04 DIAGNOSIS — E11.69 HYPERLIPIDEMIA ASSOCIATED WITH TYPE 2 DIABETES MELLITUS: ICD-10-CM

## 2022-03-04 DIAGNOSIS — E78.5 HYPERLIPIDEMIA ASSOCIATED WITH TYPE 2 DIABETES MELLITUS: ICD-10-CM

## 2022-03-04 DIAGNOSIS — R06.02 SHORTNESS OF BREATH: ICD-10-CM

## 2022-03-04 DIAGNOSIS — I50.42 CHRONIC COMBINED SYSTOLIC AND DIASTOLIC HEART FAILURE: ICD-10-CM

## 2022-03-04 DIAGNOSIS — N18.4 CKD (CHRONIC KIDNEY DISEASE), STAGE IV: ICD-10-CM

## 2022-03-04 LAB
ALBUMIN SERPL BCP-MCNC: 3.8 G/DL (ref 3.5–5.2)
ALP SERPL-CCNC: 66 U/L (ref 55–135)
ALT SERPL W/O P-5'-P-CCNC: 13 U/L (ref 10–44)
ANION GAP SERPL CALC-SCNC: 13 MMOL/L (ref 8–16)
AST SERPL-CCNC: 18 U/L (ref 10–40)
BASOPHILS # BLD AUTO: 0.03 K/UL (ref 0–0.2)
BASOPHILS NFR BLD: 0.6 % (ref 0–1.9)
BILIRUB SERPL-MCNC: 1.7 MG/DL (ref 0.1–1)
BUN SERPL-MCNC: 77 MG/DL (ref 6–20)
CALCIUM SERPL-MCNC: 9.8 MG/DL (ref 8.7–10.5)
CHLORIDE SERPL-SCNC: 108 MMOL/L (ref 95–110)
CO2 SERPL-SCNC: 22 MMOL/L (ref 23–29)
CREAT SERPL-MCNC: 2.5 MG/DL (ref 0.5–1.4)
DIFFERENTIAL METHOD: ABNORMAL
EOSINOPHIL # BLD AUTO: 0.3 K/UL (ref 0–0.5)
EOSINOPHIL NFR BLD: 6.3 % (ref 0–8)
ERYTHROCYTE [DISTWIDTH] IN BLOOD BY AUTOMATED COUNT: 27.4 % (ref 11.5–14.5)
EST. GFR  (AFRICAN AMERICAN): 24 ML/MIN/1.73 M^2
EST. GFR  (NON AFRICAN AMERICAN): 20.9 ML/MIN/1.73 M^2
GLUCOSE SERPL-MCNC: 125 MG/DL (ref 70–110)
HCT VFR BLD AUTO: 34.1 % (ref 37–48.5)
HGB BLD-MCNC: 10 G/DL (ref 12–16)
IGE SERPL-ACNC: <35 IU/ML (ref 0–100)
IMM GRANULOCYTES # BLD AUTO: 0.02 K/UL (ref 0–0.04)
IMM GRANULOCYTES NFR BLD AUTO: 0.4 % (ref 0–0.5)
LYMPHOCYTES # BLD AUTO: 1.3 K/UL (ref 1–4.8)
LYMPHOCYTES NFR BLD: 25.4 % (ref 18–48)
MCH RBC QN AUTO: 17.7 PG (ref 27–31)
MCHC RBC AUTO-ENTMCNC: 29.3 G/DL (ref 32–36)
MCV RBC AUTO: 61 FL (ref 82–98)
MONOCYTES # BLD AUTO: 0.6 K/UL (ref 0.3–1)
MONOCYTES NFR BLD: 11 % (ref 4–15)
NEUTROPHILS # BLD AUTO: 2.9 K/UL (ref 1.8–7.7)
NEUTROPHILS NFR BLD: 56.3 % (ref 38–73)
NRBC BLD-RTO: 1 /100 WBC
PLATELET # BLD AUTO: 204 K/UL (ref 150–450)
PMV BLD AUTO: ABNORMAL FL (ref 9.2–12.9)
POTASSIUM SERPL-SCNC: 4.3 MMOL/L (ref 3.5–5.1)
PROT SERPL-MCNC: 7.7 G/DL (ref 6–8.4)
RBC # BLD AUTO: 5.64 M/UL (ref 4–5.4)
SODIUM SERPL-SCNC: 143 MMOL/L (ref 136–145)
WBC # BLD AUTO: 5.2 K/UL (ref 3.9–12.7)

## 2022-03-04 PROCEDURE — 99999 PR PBB SHADOW E&M-EST. PATIENT-LVL V: CPT | Mod: PBBFAC,TXP,, | Performed by: INTERNAL MEDICINE

## 2022-03-04 PROCEDURE — 3008F PR BODY MASS INDEX (BMI) DOCUMENTED: ICD-10-PCS | Mod: CPTII,TXP,, | Performed by: INTERNAL MEDICINE

## 2022-03-04 PROCEDURE — 4010F PR ACE/ARB THEARPY RXD/TAKEN: ICD-10-PCS | Mod: CPTII,TXP,, | Performed by: INTERNAL MEDICINE

## 2022-03-04 PROCEDURE — 86003 ALLG SPEC IGE CRUDE XTRC EA: CPT | Mod: 59,NTX | Performed by: INTERNAL MEDICINE

## 2022-03-04 PROCEDURE — 99214 PR OFFICE/OUTPT VISIT, EST, LEVL IV, 30-39 MIN: ICD-10-PCS | Mod: S$PBB,TXP,, | Performed by: INTERNAL MEDICINE

## 2022-03-04 PROCEDURE — 83880 ASSAY OF NATRIURETIC PEPTIDE: CPT | Mod: TXP | Performed by: INTERNAL MEDICINE

## 2022-03-04 PROCEDURE — 1160F RVW MEDS BY RX/DR IN RCRD: CPT | Mod: CPTII,TXP,, | Performed by: INTERNAL MEDICINE

## 2022-03-04 PROCEDURE — 3074F PR MOST RECENT SYSTOLIC BLOOD PRESSURE < 130 MM HG: ICD-10-PCS | Mod: CPTII,TXP,, | Performed by: INTERNAL MEDICINE

## 2022-03-04 PROCEDURE — 99215 OFFICE O/P EST HI 40 MIN: CPT | Mod: PBBFAC,NTX | Performed by: INTERNAL MEDICINE

## 2022-03-04 PROCEDURE — 3078F DIAST BP <80 MM HG: CPT | Mod: CPTII,TXP,, | Performed by: INTERNAL MEDICINE

## 2022-03-04 PROCEDURE — 1159F MED LIST DOCD IN RCRD: CPT | Mod: CPTII,TXP,, | Performed by: INTERNAL MEDICINE

## 2022-03-04 PROCEDURE — 1160F PR REVIEW ALL MEDS BY PRESCRIBER/CLIN PHARMACIST DOCUMENTED: ICD-10-PCS | Mod: CPTII,TXP,, | Performed by: INTERNAL MEDICINE

## 2022-03-04 PROCEDURE — 82785 ASSAY OF IGE: CPT | Mod: NTX | Performed by: INTERNAL MEDICINE

## 2022-03-04 PROCEDURE — 86003 ALLG SPEC IGE CRUDE XTRC EA: CPT | Mod: TXP | Performed by: INTERNAL MEDICINE

## 2022-03-04 PROCEDURE — 3044F PR MOST RECENT HEMOGLOBIN A1C LEVEL <7.0%: ICD-10-PCS | Mod: CPTII,TXP,, | Performed by: INTERNAL MEDICINE

## 2022-03-04 PROCEDURE — 85025 COMPLETE CBC W/AUTO DIFF WBC: CPT | Mod: NTX | Performed by: INTERNAL MEDICINE

## 2022-03-04 PROCEDURE — 3074F SYST BP LT 130 MM HG: CPT | Mod: CPTII,TXP,, | Performed by: INTERNAL MEDICINE

## 2022-03-04 PROCEDURE — 80053 COMPREHEN METABOLIC PANEL: CPT | Mod: TXP | Performed by: INTERNAL MEDICINE

## 2022-03-04 PROCEDURE — 99999 PR PBB SHADOW E&M-EST. PATIENT-LVL V: ICD-10-PCS | Mod: PBBFAC,TXP,, | Performed by: INTERNAL MEDICINE

## 2022-03-04 PROCEDURE — 4010F ACE/ARB THERAPY RXD/TAKEN: CPT | Mod: CPTII,TXP,, | Performed by: INTERNAL MEDICINE

## 2022-03-04 PROCEDURE — 99214 OFFICE O/P EST MOD 30 MIN: CPT | Mod: S$PBB,TXP,, | Performed by: INTERNAL MEDICINE

## 2022-03-04 PROCEDURE — 3008F BODY MASS INDEX DOCD: CPT | Mod: CPTII,TXP,, | Performed by: INTERNAL MEDICINE

## 2022-03-04 PROCEDURE — 1159F PR MEDICATION LIST DOCUMENTED IN MEDICAL RECORD: ICD-10-PCS | Mod: CPTII,TXP,, | Performed by: INTERNAL MEDICINE

## 2022-03-04 PROCEDURE — 3078F PR MOST RECENT DIASTOLIC BLOOD PRESSURE < 80 MM HG: ICD-10-PCS | Mod: CPTII,TXP,, | Performed by: INTERNAL MEDICINE

## 2022-03-04 PROCEDURE — 3044F HG A1C LEVEL LT 7.0%: CPT | Mod: CPTII,TXP,, | Performed by: INTERNAL MEDICINE

## 2022-03-04 RX ORDER — METOLAZONE 5 MG/1
5 TABLET ORAL
Status: ON HOLD | COMMUNITY
Start: 2022-02-05 | End: 2022-11-03 | Stop reason: HOSPADM

## 2022-03-04 RX ORDER — ATORVASTATIN CALCIUM 40 MG/1
40 TABLET, FILM COATED ORAL NIGHTLY
Qty: 90 TABLET | Refills: 3 | Status: SHIPPED | OUTPATIENT
Start: 2022-03-04 | End: 2022-10-12 | Stop reason: SDUPTHER

## 2022-03-04 RX ORDER — DIAZEPAM 5 MG/1
5 TABLET ORAL 2 TIMES DAILY PRN
COMMUNITY
Start: 2022-01-09 | End: 2022-06-21

## 2022-03-04 NOTE — LETTER
March 9, 2022        Benson Cortez  [address] [place] [address]  Phone: [phone]  Fax: [phone]             [facility]  [address]  [place] [address]  Phone: [phone]   Patient: Hafsa Hawley   MR Number: [id number]   YOB: 1965   Date of Visit: 3/4/2022       Dear Dr. Benson Cortez    Thank you for referring Hafsa Hawley to me for evaluation. Attached you will find relevant portions of my assessment and plan of care.    If you have questions, please do not hesitate to call me. I look forward to following Hafsa Hawley along with you.    Sincerely,    Jake Orozco Jr, MD    Enclosure    If you would like to receive this communication electronically, please contact [email or web address] or [phone] to request [employer] Link access.    [employer] Link is a tool which provides read-only access to select patient information with whom you have a relationship. Its easy to use and provides real time access to review your patients record including encounter summaries, notes, results, and demographic information.    If you feel you have received this communication in error or would no longer like to receive these types of communications, please e-mail [email or web address]

## 2022-03-04 NOTE — PROGRESS NOTES
Subjective:   Transplant status: active    HPI:  Ms. Hawley is a 56 y.o. year old Black or  female who is undergoing evaluation for advanced options-- heart kidney transplant    57 yo BF with hypertensive cardiovascular disease resulting in congestive heart failure and renal failure as well as having a history of diabetes.  After seeing Dr. Lorenzo she underwent an evaluation for cardiac amyloidosis.  Kappa and lambda chains both elevated though ratio normal and no monoclonal bands on blood or urine.  She did see Heme who confirmed no MGUS and no need for further evaluation.  She had PYP scan negative.    At this time she has significant dyspnea on exertion but is able to walk further on current therapy walking from the garage to the office at a slow pace today.    Past Medical History:   Diagnosis Date    Anemia     Anticoagulant long-term use     Arthritis     Atrial fibrillation     CKD (chronic kidney disease), stage IV 5/8/2018    Congestive heart failure     COPD (chronic obstructive pulmonary disease)     Deep vein thrombosis     elevated bilirubin d/t Gilbert's syndrome     confirmed by [facility] genetic testing, evaluated by hepatology    Encounter for blood transfusion     GERD (gastroesophageal reflux disease)     Hypertension     Hypertensive cardiovascular-renal disease, stage 1-4 or unspecified chronic kidney disease, with heart failure 3/4/2022    Pheochromocytoma, malignant     Right kidney mass     Sleep apnea     Thalassemia trait, alpha     Thyroid disease     Type 2 diabetes mellitus with hyperglycemia, without long-term current use of insulin 8/13/2020     Past Surgical History:   Procedure Laterality Date    APPENDECTOMY      BONE MARROW BIOPSY      CARDIAC DEFIBRILLATOR PLACEMENT Left 12/2016    CARDIAC ELECTROPHYSIOLOGY MAPPING AND ABLATION      EYE SURGERY      due to running tears    FRACTURE SURGERY Left     hand 5th digit    HYSTERECTOMY      KNEE  "SURGERY Left 2016    hematoma    LIVER BIOPSY  10/24/2018    Minimal steatosis, predominantly macrovesicular, 1%, Minimal nonspecific portal inflammation, no fibrosis. No findings on biopsy to explain elevated bilirubin levels. Could be d/t Gilbert's =?- hemolysis    RIGHT HEART CATHETERIZATION Right 2021    Procedure: INSERTION, CATHETER, RIGHT HEART;  Surgeon: rIving Cardenas MD;  Location: [facility] CATH LAB;  Service: Cardiology;  Laterality: Right;    TRANSJUGULAR BIOPSY OF LIVER N/A 10/24/2018    Procedure: BIOPSY, LIVER, TRANSJUGULAR APPROACH;  Surgeon: Carmen Diagnostic Provider;  Location: [facility] OR [address] [place] [address];  Service: Radiology;  Laterality: N/A;     OB History        2    Para   2    Term   2            AB        Living           SAB        IAB        Ectopic        Multiple        Live Births                   Review of Systems   Cardiovascular: Positive for dyspnea on exertion, leg swelling and orthopnea ( 2 pillows). Negative for chest pain, irregular heartbeat, near-syncope, palpitations, paroxysmal nocturnal dyspnea and syncope.   Respiratory: Negative for wheezing.    Hematologic/Lymphatic: Does not bruise/bleed easily.   Musculoskeletal: Positive for arthritis, back pain, joint pain, muscle weakness and stiffness.   Gastrointestinal: Positive for hemorrhoids.   Genitourinary: Positive for nocturia.   Neurological: Positive for numbness (tingling in both feet and in her fingers both hands no definite carpal tunnel syndrome symptoms) and paresthesias. Negative for brief paralysis and focal weakness.       Objective:   Blood pressure 122/66, pulse 69, height 5' 6" (1.676 m), weight 92.6 kg (204 lb 2.3 oz).body mass index is 32.95 kg/m².  Physical Exam  Constitutional:       General: She is not in acute distress.     Appearance: She is well-developed. She is obese. She is not ill-appearing, toxic-appearing or diaphoretic.      Comments: /66 (Patient Position: Sitting, BP Method: Medium " "(Automatic))   Pulse 69   Ht 5' 6" (1.676 m)   Wt 92.6 kg (204 lb 2.3 oz)   LMP  (LMP Unknown)   BMI 32.95 kg/m²      HENT:      Head: Normocephalic and atraumatic.   Eyes:      General: No scleral icterus.        Right eye: No discharge.         Left eye: No discharge.      Conjunctiva/sclera: Conjunctivae normal.   Neck:      Thyroid: No thyromegaly.      Vascular: No JVD.      Trachea: No tracheal deviation.   Cardiovascular:      Rate and Rhythm: Normal rate and regular rhythm.      Heart sounds: Normal heart sounds. No murmur heard.    No gallop.   Pulmonary:      Effort: Pulmonary effort is normal.      Breath sounds: Normal breath sounds.   Abdominal:      General: Bowel sounds are normal. There is no distension.      Palpations: Abdomen is soft. There is no mass.      Tenderness: There is no abdominal tenderness. There is no guarding or rebound.   Musculoskeletal:         General: No swelling or tenderness.      Right lower leg: No edema.      Left lower leg: No edema.   Skin:     General: Skin is warm and dry.   Neurological:      General: No focal deficit present.      Mental Status: She is alert and oriented to person, place, and time. Mental status is at baseline.   Psychiatric:         Mood and Affect: Mood normal.         Behavior: Behavior normal.         Thought Content: Thought content normal.         Judgment: Judgment normal.       Lab Results   Component Value Date    BNP 1,432 (H) 12/08/2021     02/15/2022    K 4.2 02/15/2022    MG 1.9 02/15/2022     02/15/2022    CO2 25 02/15/2022    PHOS 4.2 02/15/2022    BUN 59 (H) 02/15/2022    CREATININE 2.2 (H) 02/15/2022     (H) 02/15/2022    HGBA1C 5.7 (H) 02/15/2022    AST 18 02/10/2022    ALT 14 02/10/2022    ALBUMIN 3.7 02/10/2022    PROT 7.4 02/10/2022    BILITOT 1.3 (H) 02/10/2022    WBC 5.51 02/15/2022    HGB 10.1 (L) 02/15/2022    HCT 35.3 (L) 02/15/2022    HCT 45 12/07/2021     02/15/2022    INR 1.0 12/04/2021    "  02/10/2022    TSH 1.561 12/04/2021    Y8PMJDY 8.4 09/11/2017    FREET4 1.07 05/09/2018    CHOL 110 (L) 12/03/2021    HDL 62 12/03/2021    LDLCALC 33.2 (L) 12/03/2021    TRIG 74 12/03/2021     ADDITIONAL LABS FROM TODAY   Lab Results   Component Value Date     03/04/2022    K 4.3 03/04/2022     03/04/2022    CO2 22 (L) 03/04/2022    BUN 77 (H) 03/04/2022    CREATININE 2.5 (H) 03/04/2022     (H) 03/04/2022    AST 18 03/04/2022    ALT 13 03/04/2022    ALBUMIN 3.8 03/04/2022    PROT 7.7 03/04/2022    BILITOT 1.7 (H) 03/04/2022    WBC 5.20 03/04/2022    HGB 10.0 (L) 03/04/2022    HCT 34.1 (L) 03/04/2022     03/04/2022     Assessment:      1. Heart transplant candidate    2. Chronic combined systolic and diastolic heart failure    3. CKD (chronic kidney disease), stage IV    4. Hypertensive cardiovascular-renal disease, stage 1-4 or unspecified chronic kidney disease, with heart failure    5. NICM (nonischemic cardiomyopathy)    6. Hyperlipidemia associated with type 2 diabetes mellitus        Plan:   Dr. Chacko's note is pending.  Discussed the process of selection committee to determine if she is an appropriate candidate for heart transplant.  If she is approved for heart transplant it would be under the condition that she would be a candidate for renal transplant and at that time she will be referred to Transplant Nephrology for their evaluation.    I reviewed her medications.  She is not taking potassium at present out for the past couple of weeks.  It she previously was on 1 tablet of potassium per day not sure if he was 10 or 20 mEq.  Will await her labs and make appropriate adjustments.  She takes metolazone on Mondays and Thursdays.  She was not taking extra potassium on those days.  She ran out of [facility] but I am send her in a prescription today.    I reviewed ICD interrogations.  She was to see EP February 2022 according to last interrogation and should have an upcoming  interrogation in March.  I sent a message to EP they can assure her visits are up to date.    Patient is now NYHA III  Recommend 2 gram sodium restriction and 1500cc fluid restriction.  Encourage physical activity with graded exercise program.  Requested patient to weigh themselves daily, and to notify us if their weight increases by more than 3 lbs in 1 day or 5 lbs in 1 week.     Listed for transplant: No    Patient advised that it is recommended that all transplant candidates, and their close contacts and household members receive Covid vaccination.    UNOS Patient Status  Functional Status: 70% - Cares for self: unable to carry on normal activity or active work  Physical Capacity: No Limitations  Working for Income: Unknown

## 2022-03-07 LAB
A ALTERNATA IGE QN: <0.1 KU/L
A FUMIGATUS IGE QN: <0.1 KU/L
BAHIA GRASS IGE QN: <0.1 KU/L
BERMUDA GRASS IGE QN: <0.1 KU/L
COMMON RAGWEED IGE QN: <0.1 KU/L
D FARINAE IGE QN: <0.1 KU/L
D PTERONYSS IGE QN: 0.11 KU/L
DEPRECATED A ALTERNATA IGE RAST QL: NORMAL
DEPRECATED A FUMIGATUS IGE RAST QL: NORMAL
DEPRECATED BAHIA GRASS IGE RAST QL: NORMAL
DEPRECATED BERMUDA GRASS IGE RAST QL: NORMAL
DEPRECATED COMMON RAGWEED IGE RAST QL: NORMAL
DEPRECATED D FARINAE IGE RAST QL: NORMAL
DEPRECATED D PTERONYSS IGE RAST QL: ABNORMAL
DEPRECATED ENGL PLANTAIN IGE RAST QL: NORMAL
DEPRECATED ROACH IGE RAST QL: NORMAL
DEPRECATED WHITE OAK IGE RAST QL: NORMAL
ENGL PLANTAIN IGE QN: <0.1 KU/L
NT-PROBNP SERPL-MCNC: 1281 PG/ML
ROACH IGE QN: <0.1 KU/L
WHITE OAK IGE QN: <0.1 KU/L

## 2022-03-08 ENCOUNTER — TELEPHONE (OUTPATIENT)
Dept: ENDOSCOPY | Facility: HOSPITAL | Age: 57
End: 2022-03-08
Payer: MEDICAID

## 2022-03-08 ENCOUNTER — HOSPITAL ENCOUNTER (OUTPATIENT)
Dept: PREADMISSION TESTING | Facility: HOSPITAL | Age: 57
Discharge: HOME | End: 2022-03-08
Attending: CLINICAL NURSE SPECIALIST
Payer: MEDICAID

## 2022-03-08 ENCOUNTER — TELEPHONE (OUTPATIENT)
Dept: SURGERY | Facility: CLINIC | Age: 57
End: 2022-03-08
Payer: MEDICAID

## 2022-03-08 DIAGNOSIS — Z01.818 PRE-OP TESTING: ICD-10-CM

## 2022-03-08 LAB
CLASS I ANTIBODIES - LUMINEX: NORMAL
CLASS I ANTIBODY COMMENTS - LUMINEX: NORMAL
CLASS II ANTIBODIES - LUMINEX: NEGATIVE
CPRA %: 61
HPRA INTERPRETATION: NORMAL
SERUM COLLECTION DT - LUMINEX CLASS I: NORMAL
SERUM COLLECTION DT - LUMINEX CLASS II: NORMAL
SPCL1 TESTING DATE: NORMAL
SPCL2 TESTING DATE: NORMAL
SPLUA TESTING DATE: NORMAL

## 2022-03-08 PROCEDURE — U0005 INFEC AGEN DETEC AMPLI PROBE: HCPCS | Performed by: CLINICAL NURSE SPECIALIST

## 2022-03-08 PROCEDURE — U0003 INFECTIOUS AGENT DETECTION BY NUCLEIC ACID (DNA OR RNA); SEVERE ACUTE RESPIRATORY SYNDROME CORONAVIRUS 2 (SARS-COV-2) (CORONAVIRUS DISEASE [COVID-19]), AMPLIFIED PROBE TECHNIQUE, MAKING USE OF HIGH THROUGHPUT TECHNOLOGIES AS DESCRIBED BY CMS-2020-01-R: HCPCS | Mod: NTX | Performed by: CLINICAL NURSE SPECIALIST

## 2022-03-08 RX ORDER — PANTOPRAZOLE SODIUM 40 MG/1
40 TABLET, DELAYED RELEASE ORAL DAILY
Qty: 30 TABLET | Refills: 11 | OUTPATIENT
Start: 2022-03-08 | End: 2023-03-08

## 2022-03-08 NOTE — TELEPHONE ENCOUNTER
"        Polly Menchaca   2017 8:40 AM   Sleep Center Visit   MRN: [id number]    Department:  [facility]   Dept Phone:  [phone]    Description:  Female : 1965   Provider:  DORIS Roach           Reason for Visit     Apnea CPAP 9      Allergies as of 2017     Allergen Noted Reactions    Iodine 2010       IV iodine causes syncope    Tape 2010   Rash    SILK; may use paper tape      You were diagnosed with     JOSE on CPAP   [633129]       Hypersomnia   [532379]       Insomnia, unspecified type   [4337054]         Vital Signs     Blood Pressure Pulse Temperature Respirations Height Weight    128/82 mmHg 75 37 °C (98.6 °F) 16 1.626 m (5' 4\") 133.811 kg (295 lb)    Body Mass Index Oxygen Saturation Smoking Status             50.61 kg/m2 98% Never Smoker          Basic Information     Date Of Birth Sex Race Ethnicity Preferred Language    1965 Female White Non- English      Your appointments     May 26, 2017  7:00 AM   Established Patient with Dorys Young M.D.   [address] [id number] [phone]           You will be receiving a confirmation call a few days before your appointment from our automated call confirmation system.            2017  7:00 AM   Established Patient with Dorys Young M.D.   [address] [id number] [phone]           You will be receiving a confirmation call a few days before your appointment from our automated call confirmation system.            2017  8:00 AM   Follow UP with QUINTON Heredia   [facility] (--)    [address] [place] [id number] [phone]              Problem List              ICD-10-CM Priority Class Noted - Resolved    Other specified symptom associated with female genital organs N94.89 " Attempted to return pt call regarding upcoming colonoscopy. Unable to lvm.     Unknown - Present    Stress incontinence N39.3   5/27/2014 - Present    Osteoarthritis M19.90   9/12/2014 - Present    Hypothyroidism E03.9   9/12/2014 - Present    Insomnia G47.00   9/12/2014 - Present    JOSE on CPAP G47.33, Z99.89   9/12/2014 - Present    HTN (hypertension) I10   9/12/2014 - Present    Chronic granulomatous disease (HCC) D71   9/12/2014 - Present    History of cold sores Z86.19   5/2/2016 - Present    Hypersomnia G47.10   5/6/2016 - Present    Adult BMI 50.0-59.9 kg/sq m (CMS-HCC) Z68.43   1/12/2017 - Present      Health Maintenance        Date Due Completion Dates    COLON CANCER SCREENING ANNUAL FIT 12/25/2017 12/25/2016    MAMMOGRAM 1/12/2018 1/12/2017, 5/1/2015, 9/25/2014    IMM DTaP/Tdap/Td Vaccine (2 - Td) 8/20/2025 8/20/2015            Current Immunizations     Influenza Vaccine Quad Inj (Preserved) 11/3/2016    Tdap Vaccine 8/20/2015      Below and/or attached are the medications your provider expects you to take. Review all of your home medications and newly ordered medications with your provider and/or pharmacist. Follow medication instructions as directed by your provider and/or pharmacist. Please keep your medication list with you and share with your provider. Update the information when medications are discontinued, doses are changed, or new medications (including over-the-counter products) are added; and carry medication information at all times in the event of emergency situations     Allergies:  IODINE - (reactions not documented)     TAPE - Rash               Medications  Valid as of: May 18, 2017 -  9:04 AM    Generic Name Brand Name Tablet Size Instructions for use    Diclofenac Sodium (Tablet Delayed Response) VOLTAREN 75 MG TAKE 1 TABLET BY MOUTH TWICE DAILY        DULoxetine HCl (Cap DR Particles) CYMBALTA 60 MG Take 1 Cap by mouth every day.        Levothyroxine Sodium (Tab) SYNTHROID 125 MCG Take 1 Tab by mouth Every morning on an empty stomach.        Lorcaserin HCl  (Tab) BELVIQ 10 MG Take 1 Tab by mouth 2 Times a Day.        Suvorexant (Tab) Suvorexant 20 MG Take 1 Tab by mouth at bedtime as needed (for insomnia). Take 1 tablet by mouth at bedtime as needed for insomnia.        ValACYclovir HCl (Tab) VALTREX 1 GM TAKE 1 TABLET BY MOUTH EVERY MORNING        .                 Medicines prescribed today were sent to:     [facility] [address] [place] [address], [place] - [address] [facility] [address] [facility] [address] [place]    [address] [facility] [address] [place] [address]    Phone: [phone] Fax: [phone]    Open 24 Hours?: No      Medication refill instructions:       If your prescription bottle indicates you have medication refills left, it is not necessary to call your provider’s office. Please contact your pharmacy and they will refill your medication.    If your prescription bottle indicates you do not have any refills left, you may request refills at any time through one of the following ways: The online [employer] system (except Urgent Care), by calling your provider’s office, or by asking your pharmacy to contact your provider’s office with a refill request. Medication refills are processed only during regular business hours and may not be available until the next business day. Your provider may request additional information or to have a follow-up visit with you prior to refilling your medication.   *Please Note: Medication refills are assigned a new Rx number when refilled electronically. Your pharmacy may indicate that no refills were authorized even though a new prescription for the same medication is available at the pharmacy. Please request the medicine by name with the pharmacy before contacting your provider for a refill.        Instructions      Plan:    1) Continue CPAP at 9 CM H20. Order for dream wear mask to her DME.   2) Sleep hygiene discussed. Increase Belsomra to 20 mg 1 po qhs prn insomnia. Samples and RX provided.  3) She does have joint pain that effects her  sleep. She states she is pending an appointment with Rheumatology to eval for possible Lupus.   4) Weight loss recommended.  5) Follow up in 6 months, sooner if needed.           [employer] Access Code: Activation code not generated  Current [employer] Status: Active

## 2022-03-08 NOTE — TELEPHONE ENCOUNTER
----- Message from Sivakumar Flower sent at 3/8/2022  9:55 AM CST -----  Contact: patient  Pt needs to speak w/ nurse in regards to rescheduling upcoming colonoscopy     Call back @[phone]

## 2022-03-09 ENCOUNTER — COMMITTEE REVIEW (OUTPATIENT)
Dept: TRANSPLANT | Facility: CLINIC | Age: 57
End: 2022-03-09
Payer: MEDICAID

## 2022-03-09 ENCOUNTER — TELEPHONE (OUTPATIENT)
Dept: TRANSPLANT | Facility: CLINIC | Age: 57
End: 2022-03-09
Payer: MEDICAID

## 2022-03-09 LAB
SARS-COV-2 RNA RESP QL NAA+PROBE: NOT DETECTED
SARS-COV-2- CYCLE NUMBER: NORMAL

## 2022-03-09 NOTE — COMMITTEE REVIEW
Native Organ Dx: NICM    Declined    I presented Ms. Hawley at Heart Transplant and LVAD selection committee 03/09/2022.  The committee declined her for heart transplant listing or VAD placement due to renal function, lung function and difficulty consistently following up with the team.  Keep in [facility] to determine improvement in these areas.     Note was written by Kezia Clifton RN.    ==========================================================    I agree and attest to the decision of the committee.

## 2022-03-09 NOTE — TELEPHONE ENCOUNTER
"I spoke with Ms Hawley and let her know the committee decision.  She became very tearful and asked how long she has to live and should she "just lay down and die?"  I offered the following suggestions and tried to offer support over the phone.  I offered to call her daughter or niece, but she declined.    I let her know that her poor lung function and kidney function is not suitable for transplant or VAD placement.  I asked her to increase her walking, work on taking all her medications as instructed (including DM meds) and fu with us, Pulmonary, and her PCP for DM management.  I asked her to complete daily weights and notify us of changes and worsening hf symptoms.  We agreed to further discuss on Friday in a phone call.    "

## 2022-03-10 ENCOUNTER — TELEPHONE (OUTPATIENT)
Dept: TRANSPLANT | Facility: CLINIC | Age: 57
End: 2022-03-10
Payer: MEDICAID

## 2022-03-10 ENCOUNTER — ANESTHESIA EVENT (OUTPATIENT)
Dept: ENDOSCOPY | Facility: HOSPITAL | Age: 57
End: 2022-03-10
Payer: MEDICAID

## 2022-03-10 LAB
ALLERGEN NAME: NORMAL
ALLERGEN RESULT: NORMAL

## 2022-03-10 NOTE — TELEPHONE ENCOUNTER
----- Message from Ina Woody sent at 3/10/2022  8:26 AM CST -----  Regarding: Concerns please call  Pt [phone] called in wanting to speak with Dr. Orozco about her surgeries and recent visits. I informed the pt that we don't send messages directly to the doctors, but a nurse will give you a call. She still stated that she would rather speak to the Dr. Orozco.    Thanks

## 2022-03-11 ENCOUNTER — TELEPHONE (OUTPATIENT)
Dept: TRANSPLANT | Facility: CLINIC | Age: 57
End: 2022-03-11
Payer: MEDICAID

## 2022-03-11 ENCOUNTER — ANESTHESIA (OUTPATIENT)
Dept: ENDOSCOPY | Facility: HOSPITAL | Age: 57
End: 2022-03-11
Payer: MEDICAID
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
## 2022-03-11 NOTE — TELEPHONE ENCOUNTER
Care of patient is being transferred to CHF section from Preht section, per Dr. Cardenas.    Dx:NICM  Reason: does not meet criteria  Pt is not on home inotrope therapy.      Outstanding orders scheduled:    Scheduled to RTC with lab to see Dr Orozco on 04/26/2022  Scheduled to FU with Dr Mattson (Pulm) 3/23  Advised to FU with Dr Ann (PCP) for DM management

## 2022-03-15 ENCOUNTER — TELEPHONE (OUTPATIENT)
Dept: FAMILY MEDICINE | Facility: CLINIC | Age: 57
End: 2022-03-15
Payer: MEDICAID

## 2022-03-15 DIAGNOSIS — E11.65 TYPE 2 DIABETES MELLITUS WITH HYPERGLYCEMIA, WITHOUT LONG-TERM CURRENT USE OF INSULIN: ICD-10-CM

## 2022-03-15 NOTE — TELEPHONE ENCOUNTER
PT states that glucometer test strips aren't covered by insurance would like to know if a new one could be called out for her. I do have pt scheduled for 03/18 with you.

## 2022-03-15 NOTE — TELEPHONE ENCOUNTER
----- Message from Frank Cook sent at 3/15/2022  9:48 AM CDT -----  Contact: Patient  Hafsa Hawley  MRN: [id number]  : 1965  PCP: Cristobal Ann  Home Phone      [phone]  Work Phone      Not on file.  Mobile          [phone]  Home Phone      [phone]      MESSAGE: needs diabetes test strips - states the ones sent to pharmacy are not covered by her insurance -- may need a whole new test kit that insurance will cover -- has [employer] (medicaid) -- uses [facility] in [place]    Also needs refill on Glimepiride 2 mg -- LOV 21 --  30 day supply     Call [phone]    PCP: Seth

## 2022-03-15 NOTE — TELEPHONE ENCOUNTER
No new care gaps identified.  Powered by [employer]. Reference number: [id number].   3/15/2022 10:21:58 AM CDT

## 2022-03-15 NOTE — TELEPHONE ENCOUNTER
Called pt no answer pt will need to call insurance to see what strips they cover to her meter if not then what meter is covered.     Sent refill request to Seth Garza

## 2022-03-16 ENCOUNTER — TELEPHONE (OUTPATIENT)
Dept: CARDIOLOGY | Facility: HOSPITAL | Age: 57
End: 2022-03-16
Payer: MEDICAID

## 2022-03-16 DIAGNOSIS — I42.8 NICM (NONISCHEMIC CARDIOMYOPATHY): Primary | ICD-10-CM

## 2022-03-16 RX ORDER — GLIMEPIRIDE 2 MG/1
2 TABLET ORAL
Qty: 30 TABLET | Refills: 2 | Status: SHIPPED | OUTPATIENT
Start: 2022-03-16 | End: 2022-06-15

## 2022-03-16 RX ORDER — INSULIN PUMP SYRINGE, 3 ML
EACH MISCELLANEOUS
Qty: 1 EACH | Refills: 0 | Status: ON HOLD | OUTPATIENT
Start: 2022-03-16 | End: 2022-11-22 | Stop reason: HOSPADM

## 2022-03-16 NOTE — TELEPHONE ENCOUNTER
Called patient to arrange annual follow up to coordinate with HF visits in April. No answer. Left voicemail with request for call back if needs to reschedule.

## 2022-03-17 ENCOUNTER — TELEPHONE (OUTPATIENT)
Dept: TRANSPLANT | Facility: CLINIC | Age: 57
End: 2022-03-17
Payer: MEDICAID

## 2022-03-17 NOTE — TELEPHONE ENCOUNTER
I returned Ms Hawley' call today.  She received her denial letter.    We had a long conversation re results of selection committee, specifically lung and renal function and what she can do in order to improve her health over the next few months.    We discussed all the positive changes she has made in the last few months including establishing a PCP for DM care and renal care, establishing follow up with pulmonary, and getting her living situation figured out and less stressful.  Encouraged her to continue with these positive steps.  Additionally, discussed weight management and walking as tolerated.  Verbalized understanding of instructions.     I let her know that if her renal function and lung function improves then the doctor may think we can reevaluate her for OHT and VAD.     She saw her local cardiologist yesterday who made medication changes.  I let her know that I will ask her HF nurses to get in touch to review medicine changes.  Verbalized understanding of instructions.

## 2022-03-17 NOTE — TELEPHONE ENCOUNTER
----- Message from Ami Hopper MA sent at 3/17/2022  1:15 PM CDT -----  Regarding: Denial Letter/LVAD  Contact: self        ----- Message -----  From: Kristine Chaudhary MA  Sent: 3/17/2022  12:09 PM CDT  To: [facility]    Pt is calling to say that she received a letter saying that she is denied treatment for LVAD and transplant. Stated she would like to speak  to . Please call [phone]

## 2022-03-18 ENCOUNTER — OFFICE VISIT (OUTPATIENT)
Dept: FAMILY MEDICINE | Facility: CLINIC | Age: 57
End: 2022-03-18
Payer: MEDICAID

## 2022-03-18 VITALS
DIASTOLIC BLOOD PRESSURE: 84 MMHG | HEART RATE: 88 BPM | WEIGHT: 202.19 LBS | SYSTOLIC BLOOD PRESSURE: 132 MMHG | BODY MASS INDEX: 32.49 KG/M2 | HEIGHT: 66 IN | RESPIRATION RATE: 24 BRPM

## 2022-03-18 DIAGNOSIS — K21.9 GASTROESOPHAGEAL REFLUX DISEASE, UNSPECIFIED WHETHER ESOPHAGITIS PRESENT: ICD-10-CM

## 2022-03-18 DIAGNOSIS — N18.32 STAGE 3B CHRONIC KIDNEY DISEASE: ICD-10-CM

## 2022-03-18 DIAGNOSIS — E11.65 TYPE 2 DIABETES MELLITUS WITH HYPERGLYCEMIA, WITHOUT LONG-TERM CURRENT USE OF INSULIN: Primary | ICD-10-CM

## 2022-03-18 DIAGNOSIS — B37.2 CANDIDAL DERMATITIS: ICD-10-CM

## 2022-03-18 PROCEDURE — 99999 PR PBB SHADOW E&M-EST. PATIENT-LVL V: CPT | Mod: PBBFAC,,, | Performed by: STUDENT IN AN ORGANIZED HEALTH CARE EDUCATION/TRAINING PROGRAM

## 2022-03-18 PROCEDURE — 3044F HG A1C LEVEL LT 7.0%: CPT | Mod: CPTII,,, | Performed by: STUDENT IN AN ORGANIZED HEALTH CARE EDUCATION/TRAINING PROGRAM

## 2022-03-18 PROCEDURE — 3008F PR BODY MASS INDEX (BMI) DOCUMENTED: ICD-10-PCS | Mod: CPTII,,, | Performed by: STUDENT IN AN ORGANIZED HEALTH CARE EDUCATION/TRAINING PROGRAM

## 2022-03-18 PROCEDURE — 1160F PR REVIEW ALL MEDS BY PRESCRIBER/CLIN PHARMACIST DOCUMENTED: ICD-10-PCS | Mod: CPTII,,, | Performed by: STUDENT IN AN ORGANIZED HEALTH CARE EDUCATION/TRAINING PROGRAM

## 2022-03-18 PROCEDURE — 4010F PR ACE/ARB THEARPY RXD/TAKEN: ICD-10-PCS | Mod: CPTII,,, | Performed by: STUDENT IN AN ORGANIZED HEALTH CARE EDUCATION/TRAINING PROGRAM

## 2022-03-18 PROCEDURE — 3075F SYST BP GE 130 - 139MM HG: CPT | Mod: CPTII,,, | Performed by: STUDENT IN AN ORGANIZED HEALTH CARE EDUCATION/TRAINING PROGRAM

## 2022-03-18 PROCEDURE — 99214 PR OFFICE/OUTPT VISIT, EST, LEVL IV, 30-39 MIN: ICD-10-PCS | Mod: S$PBB,,, | Performed by: STUDENT IN AN ORGANIZED HEALTH CARE EDUCATION/TRAINING PROGRAM

## 2022-03-18 PROCEDURE — 99215 OFFICE O/P EST HI 40 MIN: CPT | Mod: PBBFAC | Performed by: STUDENT IN AN ORGANIZED HEALTH CARE EDUCATION/TRAINING PROGRAM

## 2022-03-18 PROCEDURE — 1159F MED LIST DOCD IN RCRD: CPT | Mod: CPTII,,, | Performed by: STUDENT IN AN ORGANIZED HEALTH CARE EDUCATION/TRAINING PROGRAM

## 2022-03-18 PROCEDURE — 3079F PR MOST RECENT DIASTOLIC BLOOD PRESSURE 80-89 MM HG: ICD-10-PCS | Mod: CPTII,,, | Performed by: STUDENT IN AN ORGANIZED HEALTH CARE EDUCATION/TRAINING PROGRAM

## 2022-03-18 PROCEDURE — 1159F PR MEDICATION LIST DOCUMENTED IN MEDICAL RECORD: ICD-10-PCS | Mod: CPTII,,, | Performed by: STUDENT IN AN ORGANIZED HEALTH CARE EDUCATION/TRAINING PROGRAM

## 2022-03-18 PROCEDURE — 3079F DIAST BP 80-89 MM HG: CPT | Mod: CPTII,,, | Performed by: STUDENT IN AN ORGANIZED HEALTH CARE EDUCATION/TRAINING PROGRAM

## 2022-03-18 PROCEDURE — 99214 OFFICE O/P EST MOD 30 MIN: CPT | Mod: S$PBB,,, | Performed by: STUDENT IN AN ORGANIZED HEALTH CARE EDUCATION/TRAINING PROGRAM

## 2022-03-18 PROCEDURE — 99999 PR PBB SHADOW E&M-EST. PATIENT-LVL V: ICD-10-PCS | Mod: PBBFAC,,, | Performed by: STUDENT IN AN ORGANIZED HEALTH CARE EDUCATION/TRAINING PROGRAM

## 2022-03-18 PROCEDURE — 3075F PR MOST RECENT SYSTOLIC BLOOD PRESS GE 130-139MM HG: ICD-10-PCS | Mod: CPTII,,, | Performed by: STUDENT IN AN ORGANIZED HEALTH CARE EDUCATION/TRAINING PROGRAM

## 2022-03-18 PROCEDURE — 1160F RVW MEDS BY RX/DR IN RCRD: CPT | Mod: CPTII,,, | Performed by: STUDENT IN AN ORGANIZED HEALTH CARE EDUCATION/TRAINING PROGRAM

## 2022-03-18 PROCEDURE — 4010F ACE/ARB THERAPY RXD/TAKEN: CPT | Mod: CPTII,,, | Performed by: STUDENT IN AN ORGANIZED HEALTH CARE EDUCATION/TRAINING PROGRAM

## 2022-03-18 PROCEDURE — 3008F BODY MASS INDEX DOCD: CPT | Mod: CPTII,,, | Performed by: STUDENT IN AN ORGANIZED HEALTH CARE EDUCATION/TRAINING PROGRAM

## 2022-03-18 PROCEDURE — 3044F PR MOST RECENT HEMOGLOBIN A1C LEVEL <7.0%: ICD-10-PCS | Mod: CPTII,,, | Performed by: STUDENT IN AN ORGANIZED HEALTH CARE EDUCATION/TRAINING PROGRAM

## 2022-03-18 RX ORDER — NYSTATIN 100000 [USP'U]/G
POWDER TOPICAL 2 TIMES DAILY
Qty: 60 G | Refills: 0 | Status: ON HOLD | OUTPATIENT
Start: 2022-03-18 | End: 2022-11-22 | Stop reason: HOSPADM

## 2022-03-18 NOTE — PROGRESS NOTES
Subjective:       Patient ID: Hafsa Hawley is a 56 y.o. female.    Chief Complaint: Follow-up (Pt here for check up would like to discuss medicines )    Pt here for follow-up    She recently received letter of denial for heart transplantation and LVAD implant which she is disappointed about.     She reports chronic cough, worse at night. She is followed by pulmonology. She also has water brash. She is on protonix but reports she was on pepcid and many other meds in the past. She was seen by GI before, but it has been many years.     DM2: Last A1c 5.7; she is on jardiance and amaryl.      Review of Systems   Constitutional: Negative for chills and fever.   HENT: Negative for congestion and sore throat.    Respiratory: Positive for shortness of breath. Negative for cough.    Cardiovascular: Negative for chest pain.   Gastrointestinal: Positive for abdominal pain (cramping), nausea and vomiting.   Genitourinary: Negative for dysuria and hematuria.       Objective:      Physical Exam   Constitutional: No distress.   HENT:   Head: Normocephalic and atraumatic.   Eyes: Conjunctivae are normal.   Cardiovascular: Normal rate, regular rhythm and normal heart sounds.   No murmur heard.  Pulmonary/Chest: Effort normal and breath sounds normal. No respiratory distress.   Musculoskeletal:      Right lower leg: Edema (trace) present.      Left lower leg: Edema (trace) present.   Neurological: She is alert.   Psychiatric:   Depressed mood; tearful at times   Vitals reviewed.      Assessment:       1. Type 2 diabetes mellitus with hyperglycemia, without long-term current use of insulin    2. Candidal dermatitis    3. Gastroesophageal reflux disease, unspecified whether esophagitis present    4. Stage 3b chronic kidney disease        Plan:       Type 2 diabetes mellitus with hyperglycemia, without long-term current use of insulin  -     Ambulatory referral/consult to Ophthalmology; Future; Expected date: 03/25/2022    Candidal  dermatitis  -     nystatin (MYCOSTATIN) powder; Apply topically 2 (two) times daily.  Dispense: 60 g; Refill: 0    Gastroesophageal reflux disease, unspecified whether esophagitis present  -     Ambulatory referral/consult to Gastroenterology; Future; Expected date: 03/25/2022    Stage 3b chronic kidney disease  -     Ambulatory referral/consult to Nephrology; Future; Expected date: 03/25/2022    Cont current meds  Refer GI for refractory GERD  Refer nephrology for CKD  Follow-up as scheduled with cards and transplant  Follow-up pulm as scheduled  Refer ophthalmology for DM eye exam  RTC 3 months or sooner if needed

## 2022-03-23 ENCOUNTER — TELEPHONE (OUTPATIENT)
Dept: FAMILY MEDICINE | Facility: CLINIC | Age: 57
End: 2022-03-23
Payer: MEDICAID

## 2022-03-23 ENCOUNTER — PATIENT OUTREACH (OUTPATIENT)
Dept: ADMINISTRATIVE | Facility: HOSPITAL | Age: 57
End: 2022-03-23
Payer: MEDICAID

## 2022-03-23 DIAGNOSIS — E11.65 TYPE 2 DIABETES MELLITUS WITH HYPERGLYCEMIA, WITHOUT LONG-TERM CURRENT USE OF INSULIN: ICD-10-CM

## 2022-03-23 RX ORDER — LANCETS
1 EACH MISCELLANEOUS 3 TIMES DAILY
Qty: 100 EACH | Refills: 11 | Status: SHIPPED | OUTPATIENT
Start: 2022-03-23 | End: 2022-03-24 | Stop reason: SDUPTHER

## 2022-03-23 NOTE — TELEPHONE ENCOUNTER
----- Message from Frank Cook sent at 3/23/2022 12:03 PM CDT -----  Contact: Patient  Hafsa Hawley  MRN: [id number]  : 1965  PCP: Cristobal Ann  Home Phone      [phone]  Work Phone      Not on file.  Mobile          [phone]  Home Phone      [phone]      MESSAGE:  requesting Rx for test strips & lancets ([employer]) -- send to [facility] in [place] -- also, requesting referral to Podiatrist (brittle, breaking toenails)    Call [phone]    PCP:  Seth

## 2022-03-23 NOTE — TELEPHONE ENCOUNTER
Referral,demographics, clinicals faxed to Nephrology with  at [phone] with request for them to get in contact with patient to schedule appointment. Patient given specialist address and phone number, instructed to contact specialist to check status of appointment.     Referral,demographics, clinicals faxed to Ophthalmology with  at [phone] with request for them to get in contact with patient to schedule appointment. Patient given specialist address and phone number, instructed to contact specialist to check status of appointment.     Referral,demographics, clinicals faxed to Gastroenterology with  at [phone] with request for them to get in contact with patient to schedule appointment. Patient given specialist address and phone number, instructed to contact specialist to check status of appointment.

## 2022-03-23 NOTE — PROGRESS NOTES
Chart reviewed, immunization record updated.  No new results noted on [facility] or [facility] web site.  Care Everywhere updated.   Patient care coordination note and upcoming PCP visit updated.  Patient has scheduled PCP visit on 6/21/2022.  Noted patient has referral submitted for Dr. Boykin for Colonoscopy.   Left detailed message for patient to call to discuss if she has had eye exam in the past.

## 2022-03-24 DIAGNOSIS — E11.65 TYPE 2 DIABETES MELLITUS WITH HYPERGLYCEMIA, WITHOUT LONG-TERM CURRENT USE OF INSULIN: ICD-10-CM

## 2022-03-24 PROBLEM — J44.9 COPD (CHRONIC OBSTRUCTIVE PULMONARY DISEASE): Chronic | Status: RESOLVED | Noted: 2017-12-19 | Resolved: 2022-03-24

## 2022-03-24 NOTE — TELEPHONE ENCOUNTER
----- Message from Paola Martinez MA sent at 3/24/2022  1:23 PM CDT -----  Hafsa Hawley  MRN: [id number]  : 1965  PCP: Cristobal Ann  Home Phone      [phone]  Work Phone      Not on file.  Mobile          [phone]  Home Phone      [phone]      MESSAGE:  Patient states that she picked up her new meter but she did not get any testing supplies to go with it.    Please Advise:  [phone]

## 2022-03-24 NOTE — TELEPHONE ENCOUNTER
No new care gaps identified.  Powered by [employer]. Reference number: [id number].   3/24/2022 1:47:55 PM CDT

## 2022-03-25 RX ORDER — LANCETS
1 EACH MISCELLANEOUS 3 TIMES DAILY
Qty: 100 EACH | Refills: 11 | Status: SHIPPED | OUTPATIENT
Start: 2022-03-25 | End: 2023-11-06 | Stop reason: SDUPTHER

## 2022-04-04 ENCOUNTER — PATIENT MESSAGE (OUTPATIENT)
Dept: ADMINISTRATIVE | Facility: HOSPITAL | Age: 57
End: 2022-04-04
Payer: MEDICAID

## 2022-04-06 DIAGNOSIS — N18.32 CHRONIC KIDNEY DISEASE (CKD) STAGE G3B/A1, MODERATELY DECREASED GLOMERULAR FILTRATION RATE (GFR) BETWEEN 30-44 ML/MIN/1.73 SQUARE METER AND ALBUMINURIA CREATININE RATIO LESS THAN 30 MG/G: Primary | ICD-10-CM

## 2022-04-07 ENCOUNTER — OFFICE VISIT (OUTPATIENT)
Dept: NEPHROLOGY | Facility: CLINIC | Age: 57
End: 2022-04-07
Payer: MEDICAID

## 2022-04-07 VITALS
WEIGHT: 207 LBS | HEART RATE: 91 BPM | BODY MASS INDEX: 33.27 KG/M2 | DIASTOLIC BLOOD PRESSURE: 88 MMHG | SYSTOLIC BLOOD PRESSURE: 138 MMHG | OXYGEN SATURATION: 93 % | HEIGHT: 66 IN

## 2022-04-07 DIAGNOSIS — N18.4 CKD (CHRONIC KIDNEY DISEASE) STAGE 4, GFR 15-29 ML/MIN: Primary | ICD-10-CM

## 2022-04-07 PROCEDURE — 99215 OFFICE O/P EST HI 40 MIN: CPT | Mod: PBBFAC | Performed by: INTERNAL MEDICINE

## 2022-04-07 PROCEDURE — 99999 PR PBB SHADOW E&M-EST. PATIENT-LVL V: ICD-10-PCS | Mod: PBBFAC,,, | Performed by: INTERNAL MEDICINE

## 2022-04-07 PROCEDURE — 99999 PR PBB SHADOW E&M-EST. PATIENT-LVL V: CPT | Mod: PBBFAC,,, | Performed by: INTERNAL MEDICINE

## 2022-04-07 NOTE — PROGRESS NOTES
Nephrology Clinic Progress Note    Patient ID: Hafsa Hawley is a 56 y.o. Black or  female who presents for establish care     HPI:  Hafsa Hawley is a 56 y.o. Black or  female diabetes, hypertension, hypertensive cardiomyopathy, AFib CKD stage 5, who came to establish care, patient was recently not approved for heart transplant , she is here to establish care for kidneys    The patient denies any SOB, chest pain, palpitations, dysuria, problems voiding, N/V/D, taking NSAIDs or new antibiotics, recreational drugs, recent episode of dehydration, acute illness, hospitalization or exposure to IV radiocontrast.    Past Medical History:   Diagnosis Date    Anemia     Anticoagulant long-term use     Arthritis     Atrial fibrillation     CKD (chronic kidney disease), stage IV 2018    Congestive heart failure     COPD (chronic obstructive pulmonary disease)     Deep vein thrombosis     elevated bilirubin d/t Gilbert's syndrome     confirmed by [facility] genetic testing, evaluated by hepatology    Encounter for blood transfusion     GERD (gastroesophageal reflux disease)     Hypertension     Hypertensive cardiovascular-renal disease, stage 1-4 or unspecified chronic kidney disease, with heart failure 3/4/2022    Pheochromocytoma, malignant     Right kidney mass     Sleep apnea     Thalassemia trait, alpha     Thyroid disease     Type 2 diabetes mellitus with hyperglycemia, without long-term current use of insulin 2020       Family History   Problem Relation Age of Onset    Cancer Mother         pancreatic CA early 50's    Heart disease Father          MI in late 50's    Hypertension Father     Heart attack Father     Heart disease Sister     Heart disease Brother     Cirrhosis Brother         alcoholic    Heart disease Sister     Heart disease Brother     Hypertension Brother     Diabetes Brother        Past Surgical History:   Procedure Laterality  Date    APPENDECTOMY      BONE MARROW BIOPSY      CARDIAC DEFIBRILLATOR PLACEMENT Left 12/2016    CARDIAC ELECTROPHYSIOLOGY MAPPING AND ABLATION      EYE SURGERY      due to running tears    FRACTURE SURGERY Left     hand 5th digit    HYSTERECTOMY      KNEE SURGERY Left 2016    hematoma    LIVER BIOPSY  10/24/2018    Minimal steatosis, predominantly macrovesicular, 1%, Minimal nonspecific portal inflammation, no fibrosis. No findings on biopsy to explain elevated bilirubin levels. Could be d/t Gilbert's =?- hemolysis    RIGHT HEART CATHETERIZATION Right 12/7/2021    Procedure: INSERTION, CATHETER, RIGHT HEART;  Surgeon: Irving Cardenas MD;  Location: [facility] CATH LAB;  Service: Cardiology;  Laterality: Right;    TRANSJUGULAR BIOPSY OF LIVER N/A 10/24/2018    Procedure: BIOPSY, LIVER, TRANSJUGULAR APPROACH;  Surgeon: [facility] Provider;  Location: [facility] OR [address] [place] [address];  Service: Radiology;  Laterality: N/A;         Current Outpatient Medications:     allopurinoL (ZYLOPRIM) 100 MG tablet, Take 1 tablet (100 mg total) by mouth once daily., Disp: 30 tablet, Rfl: 1    ALPRAZolam (XANAX) 2 MG Tab, Take 2 mg by mouth 2 (two) times daily as needed., Disp: , Rfl:     atorvastatin (LIPITOR) 40 MG tablet, Take 1 tablet (40 mg total) by mouth every evening., Disp: 90 tablet, Rfl: 3    b complex vitamins tablet, Take 1 tablet by mouth once daily., Disp: , Rfl:     blood sugar diagnostic (ACCU-CHEK GUIDE TEST STRIPS) Strp, 1 strip by Other route 3 (three) times daily., Disp: 100 each, Rfl: 11    blood sugar diagnostic (BLOOD GLUCOSE TEST) Strp, 1 strip by Misc.(Non-Drug; Combo Route) route 3 (three) times daily., Disp: 100 strip, Rfl: 11    blood-glucose meter kit, Use as instructed, Disp: 1 each, Rfl: 0    diazePAM (VALIUM) 5 MG tablet, Take 5 mg by mouth 2 (two) times daily as needed., Disp: , Rfl:     diclofenac sodium (VOLTAREN) 1 % Gel, APPLY 2 G TOPICALLY 3 (THREE) TIMES DAILY AS NEEDED (PAIN)., Disp: 400  g, Rfl: 0    diltiazem HCl (DILTIAZEM 2% CREAM), Apply topically 3 (three) times daily. Apply topically to anal area., Disp: 50 g, Rfl: 5    ELIQUIS 5 mg Tab, TAKE 1 TABLET BY MOUTH TWICE A DAY, Disp: 60 tablet, Rfl: 3    empagliflozin (JARDIANCE) 25 mg tablet, Jardiance 25 mg tablet, Disp: , Rfl:     ENTRESTO  mg per tablet, TAKE 1 TABLET BY MOUTH TWICE A DAY, Disp: 60 tablet, Rfl: 5    ergocalciferol (ERGOCALCIFEROL) 50,000 unit Cap, Take 50,000 Units by mouth every 7 days., Disp: , Rfl:     ferrous sulfate 325 (65 FE) MG EC tablet, Take 1 tablet (325 mg total) by mouth once daily., Disp: 30 tablet, Rfl: 0    fluticasone propionate (FLONASE) 50 mcg/actuation nasal spray, 2 sprays by Each Nostril route daily as needed for Rhinitis. , Disp: , Rfl:     furosemide (LASIX) 80 MG tablet, Take 1 tablet (80 mg total) by mouth once daily. Use afternoon dose if needed, Disp: 60 tablet, Rfl: 11    glimepiride (AMARYL) 2 MG tablet, Take 1 tablet (2 mg total) by mouth daily with breakfast., Disp: 30 tablet, Rfl: 2    hydrocortisone 2.5 % cream, APPLY TOPICALLY 2 (TWO) TIMES DAILY AS NEEDED (HEMORRHOIDS)., Disp: 28.35 g, Rfl: 0    lancets (ACCU-CHEK SOFTCLIX LANCETS) Misc, 1 Device by Misc.(Non-Drug; Combo Route) route 3 (three) times daily., Disp: 100 each, Rfl: 11    linaCLOtide (LINZESS) 145 mcg Cap capsule, Take 1 capsule (145 mcg total) by mouth before breakfast. Hold if diarrhea, Disp: 30 capsule, Rfl: 11    metOLazone (ZAROXOLYN) 5 MG tablet, Take 5 mg by mouth., Disp: , Rfl:     metoprolol succinate (TOPROL-XL) 200 MG 24 hr tablet, Take 1 tablet (200 mg total) by mouth 2 (two) times daily., Disp: 30 tablet, Rfl: 5    mometasone-formoterol (DULERA) 200-5 mcg/actuation inhaler, Inhale 2 puffs into the lungs 2 (two) times daily. Controller, Disp: 13 g, Rfl: 11    montelukast (SINGULAIR) 10 mg tablet, Take 1 tablet every day by oral route for 30 days., Disp: , Rfl:     NITROSTAT 0.4 mg SL tablet, Take  2.5 tablets (1 mg total) by mouth every 5 (five) minutes as needed for Chest pain. No more than 3 tablets in 15 minutes., Disp: , Rfl:     nystatin (MYCOSTATIN) powder, Apply topically 2 (two) times daily., Disp: 60 g, Rfl: 0    pantoprazole (PROTONIX) 40 MG tablet, TAKE 1 TABLET BY MOUTH DAILY IN THE MORNING, Disp: 30 tablet, Rfl: 11    polyethylene glycol (GLYCOLAX) 17 gram PwPk, Take 17 g by mouth 3 (three) times daily as needed (constipation/hard stools)., Disp: 100 each, Rfl: 11    rOPINIRole (REQUIP) 4 MG tablet, Take 1 tablet (4 mg total) by mouth once daily. Pt taking 4mg daily, Disp: 30 tablet, Rfl: 5    sertraline (ZOLOFT) 50 MG tablet, Take 1 tablet (50 mg total) by mouth once daily., Disp: 30 tablet, Rfl: 11    spironolactone (ALDACTONE) 25 MG tablet, Take 1 tablet (25 mg total) by mouth once daily., Disp: 30 tablet, Rfl: 11    tiotropium (SPIRIVA WITH HANDIHALER) 18 mcg inhalation capsule, Inhale 1 capsule (18 mcg total) into the lungs once daily., Disp: 1 capsule, Rfl: 2    VENTOLIN HFA 90 mcg/actuation inhaler, Inhale 2 puffs into the lungs 2 (two) times daily as needed. , Disp: , Rfl: 11    walker Misc, 1 Device by Misc.(Non-Drug; Combo Route) route as needed., Disp: 1 each, Rfl: 0  No current facility-administered medications for this visit.    Facility-Administered Medications Ordered in Other Visits:     0.9%  NaCl infusion, , Intravenous, Continuous, Corinna Hayes NP, New Bag at 05/23/19 0713    vancomycin in dextrose 5 % 1 gram/250 mL IVPB 1,000 mg, 1,000 mg, Intravenous, On Call Procedure, Corinna Hayes NP, 1,000 mg at 05/23/19 0736    Patient's medical, family, surgical, and medication hx reviewed.    Review of Systems    Constitutional: Negative for chills, diaphoresis, fever and weight loss.   HENT: Negative for nosebleeds and tinnitus.    Eyes: Negative for blurred vision, double vision and photophobia.   Respiratory: Negative for cough and shortness of breath.     Cardiovascular: . Negative for chest pain, palpitations, swelling orthopnea and PND.   Gastrointestinal: Negative for abdominal pain, diarrhea, constipation nausea and vomiting.   Genitourinary: Negative for dysuria, flank pain, frequency, hematuria and urgency.   Musculoskeletal: Negative for back pain, falls, joint pain, myalgias and neck pain.   Skin: Negative.    Neurological: Negative for dizziness, tingling, tremors, sensory change, speech change, focal weakness, seizures, loss of consciousness, weakness and headaches.   Endo/Heme/Allergies: Negative for environmental allergies and polydipsia. Does not bruise/bleed easily.   Psychiatric/Behavioral: Negative for depression, hallucinations, memory loss, substance abuse and suicidal ideas. The patient is not nervous/anxious and does not have insomnia.        Objective:     Wt Readings from Last 3 Encounters:   04/07/22 93.9 kg (207 lb 0.2 oz)   03/23/22 93.1 kg (205 lb 4.8 oz)   03/18/22 91.7 kg (202 lb 2.6 oz)     Temp Readings from Last 3 Encounters:   03/23/22 97.6 °F (36.4 °C) (Oral)   02/28/22 98.2 °F (36.8 °C) (Oral)   02/16/22 97.6 °F (36.4 °C) (Oral)     BP Readings from Last 3 Encounters:   04/07/22 138/88   03/23/22 (!) 157/97   03/18/22 132/84     Pulse Readings from Last 3 Encounters:   04/07/22 91   03/23/22 87   03/18/22 88        Physical Exam    Constitutional:  well-developed and well-nourished. No distress.   HENT:   Head: Normocephalic and atraumatic.   Neck: Normal range of motion. Neck supple.   Cardiovascular: Normal rate, regular rhythm, normal heart sounds and intact distal pulses.  Exam reveals no gallop and no friction rub.    No murmur heard.  Pulmonary/Chest: Effort normal and breath sounds normal. No respiratory distress. no wheezes, no rales, no tenderness.   Abdominal: Soft. Bowel sounds are normal, no distension. There is no tenderness. There is no rebound and no guarding.   Musculoskeletal: Normal range of motion. No edema or  deformity.   Neurological: Awake alert and oriented x 4. Motor strength preserved 5/5. DTR symmetrical.  Skin: Skin is warm and dry. No rash noted. She is not diaphoretic. No erythema. No pallor.       Assessment:       No diagnosis found.     Plan:     CKD stage 3, creatinine stable, discussed with the patient importance of better control of diabetes, hypertension, no NSAIDs, low-salt diet  Hypertension  Diabetes mellitus  Heart failure   A. fibrillation    Follow up 4 WEEKS with labs and Urine    Carolina Maria MD  Nephrology Fellow  [facility]

## 2022-04-12 NOTE — PROGRESS NOTES
Ms. Hawley is a patient of Dr. Pretty and was last seen in clinic 2/3/2021.      Subjective:   Patient ID:  Hafsa Hawley is a 56 y.o. female who presents for follow-up of ICD and Atrial Fibrillation  .     HPI:    Ms. Hawley is a 56 y.o. female with SVT (RFA 5/2019), NICM, CHF, HTN, Sleep Apnea, paroxysmal atrial fibrillation (on eliquis), Fibromyalgia, ICD, alpha thalassemia here for follow up.     Background:     Primary cardiologist is Dr. Cortez.  Longstanding history of non-ischemic cardiomyopathy.  [facility] 11/15/15 EF 30-35% with normal coronary arteries.  Has been on optimal medical therapy.  Admitted to [facility] 9/16 with acute decompensated Heart failure. Diuresed 12 liters.  Echo 8/24/16 EF 20%.  Repeat echo 10/17/16 EF 30-35% with small pericardial effusion.  ICD implanted 12/2/16 (VDD single pass lead).     8/20/2018: Went to ED with chest tightness and palpitations. HR in 150s and found to be in SVT. Given diltiazem and spontaneously converted back to sinus rhythm.   8/30/2018: CHF hospitalization. No arrhythmia during this admission.  10/21/2018: ED with CP. No arrhythmia during this admission. Device check 10/29/2019 showed SVTx2, max 52 seconds during this period.  11/17/2018: ED with CP. SVT on EKG. Spontaneous conversion.  1/27/2019: ED with chest pain and SOB. She was given Adnoesine 6mg x 1 with conversion to SR. Device check 1/30/2019 showed SVTx19, longest 10 minutes.  On EKG SVT c/w AVNRT. Ablation planned.     Underwent successful slow pathway modification on 5/23/2019. No palpitations reported at clinic visit 7/2019.   SVT/ST on device report 2/2020 - patient asymptomatic. On carvedilol 25mg BID. No RV pacing. No changes.     Patient hospitalized with covid-19 on 8/12/2020-8/16/2020. Was hospitalized her coreg was decreased due to bradycardia.   She was diagnosed with new-onset DM during hospitalization. Started on metformin.     Clinic visit 8/26/2020 she reports that she continues to  feel unwell. Has continued SOB. Some palpitations. Weakness. In a wheechair today.   Device Interrogation (8/23/2020 - remote) reveals an intrinsic SR with stable lead and device function. No ventricular arrhythmias or treated episodes were noted. No AF. SVT/STx23 episodes. Last episode 8/22/2020 16 seconds c/w ST.  Brief ST/SVT seen on device. No AF or VT. Her coreg was decreased from 25mg BID to 3.125mg BID in hospital.   Stop carvedilol. Start metoprolol succinate 25mg BID.     9/2/2020: hospitalized with elevated troponin. ASA and heparin gtt started in EF. Ultimately it was determined that her elevated troponin mostly 2/2 uncontrolled HTN and decompensated heart failure and less likely 2/2 ACS  At clinic visit 9/11/2020, ST seen on device. Metoprolol increased.     2/2021:  Notes congestion and wheezing, which is chronic in nature, not worse from baseline. Overall feeling better since increase in Toprol.  Device interrogation reveals stable function of the leads, RV pacing 0%, 3 episodes labelled as SVT c/w sinus tachycardia.  Doing well from an arrhythmia standpoint. Has occasional sinus tachycardia, which is improved with higher dose of beta blocker.    Update (04/26/2022):    2/7/2022: Neg study for amyloidosis    Says Dr. Cortez stopped spironolactone, metoprolol 3 weeks ago. (She says due to decreased kidney function). Still taking entresto.    Today she is at baseline. Denies worsening LAGUERRE, palps, syncope, CP.    She is currently taking eliquis 5mg BID for stroke prophylaxis and denies significant bleeding episodes. Kidney function is stable, with a creatinine of 2.1 on 4/26/2022.    Device Interrogation (4/26/2022) reveals an intrinsic SR with stable lead and device function. No AF since 5/2021. SVTx1, 3 min 8/2021. No ventricular arrhythmias or treated episodes were noted. She paces 0% in the RV. Estimated battery longevity 60%.    I have personally reviewed the patient's EKG today, which shows sinus  rhythm at 94bpm. AZ interval is 186. QRS is 106. QTc is 502.    Relevant Cardiac Test Results:    2D Echo (11/30/2021):  · The left ventricle is severely enlarged with eccentric hypertrophy and severely decreased systolic function. The estimated ejection fraction is 25% (unchanged).  · There is severe left ventricular global hypokinesis.  · Mild right ventricular enlargement with moderately reduced right ventricular systolic function.  · Grade II left ventricular diastolic dysfunction.  · Biatrial enlargement.  · The estimated PA systolic pressure is 59 mmHg.  · Elevated central venous pressure (15 mmHg).  · The ascending aorta is mildly dilated.  · Small posterolateral pericardial effusion.    Current Outpatient Medications   Medication Sig    allopurinoL (ZYLOPRIM) 100 MG tablet Take 1 tablet (100 mg total) by mouth once daily.    ALPRAZolam (XANAX) 2 MG Tab Take 2 mg by mouth 2 (two) times daily as needed.    atorvastatin (LIPITOR) 40 MG tablet Take 1 tablet (40 mg total) by mouth every evening.    b complex vitamins tablet Take 1 tablet by mouth once daily.    blood sugar diagnostic (ACCU-CHEK GUIDE TEST STRIPS) Strp 1 strip by Other route 3 (three) times daily.    blood sugar diagnostic (BLOOD GLUCOSE TEST) Strp 1 strip by Misc.(Non-Drug; Combo Route) route 3 (three) times daily.    blood-glucose meter kit Use as instructed    diazePAM (VALIUM) 5 MG tablet Take 5 mg by mouth 2 (two) times daily as needed.    diclofenac sodium (VOLTAREN) 1 % Gel APPLY 2 G TOPICALLY 3 (THREE) TIMES DAILY AS NEEDED (PAIN).    diltiazem HCl (DILTIAZEM 2% CREAM) Apply topically 3 (three) times daily. Apply topically to anal area.    ELIQUIS 5 mg Tab TAKE 1 TABLET BY MOUTH TWICE A DAY    empagliflozin (JARDIANCE) 25 mg tablet Jardiance 25 mg tablet    ENTRESTO  mg per tablet TAKE 1 TABLET BY MOUTH TWICE A DAY    ergocalciferol (ERGOCALCIFEROL) 50,000 unit Cap Take 50,000 Units by mouth every 7 days.    ferrous  sulfate 325 (65 FE) MG EC tablet Take 1 tablet (325 mg total) by mouth once daily.    fluticasone propionate (FLONASE) 50 mcg/actuation nasal spray 2 sprays by Each Nostril route daily as needed for Rhinitis.     furosemide (LASIX) 80 MG tablet Take 1 tablet (80 mg total) by mouth once daily. Use afternoon dose if needed    glimepiride (AMARYL) 2 MG tablet Take 1 tablet (2 mg total) by mouth daily with breakfast.    hydrocortisone 2.5 % cream APPLY TOPICALLY 2 (TWO) TIMES DAILY AS NEEDED (HEMORRHOIDS).    lancets (ACCU-CHEK SOFTCLIX LANCETS) Misc 1 Device by Misc.(Non-Drug; Combo Route) route 3 (three) times daily.    linaCLOtide (LINZESS) 145 mcg Cap capsule Take 1 capsule (145 mcg total) by mouth before breakfast. Hold if diarrhea    metOLazone (ZAROXOLYN) 5 MG tablet Take 5 mg by mouth.    metoprolol succinate (TOPROL-XL) 200 MG 24 hr tablet Take 1 tablet (200 mg total) by mouth 2 (two) times daily.    mometasone-formoterol (DULERA) 200-5 mcg/actuation inhaler Inhale 2 puffs into the lungs 2 (two) times daily. Controller    montelukast (SINGULAIR) 10 mg tablet Take 1 tablet every day by oral route for 30 days.    NITROSTAT 0.4 mg SL tablet Take 2.5 tablets (1 mg total) by mouth every 5 (five) minutes as needed for Chest pain. No more than 3 tablets in 15 minutes.    nystatin (MYCOSTATIN) powder Apply topically 2 (two) times daily.    pantoprazole (PROTONIX) 40 MG tablet TAKE 1 TABLET BY MOUTH DAILY IN THE MORNING    polyethylene glycol (GLYCOLAX) 17 gram PwPk Take 17 g by mouth 3 (three) times daily as needed (constipation/hard stools).    polyethylene glycol (GOLYTELY,NULYTELY) 236-22.74-6.74 -5.86 gram suspension Take 4,000 mLs (4 L total) by mouth once. for 1 dose    rOPINIRole (REQUIP) 4 MG tablet Take 1 tablet (4 mg total) by mouth once daily. Pt taking 4mg daily    sertraline (ZOLOFT) 50 MG tablet Take 1 tablet (50 mg total) by mouth once daily.    spironolactone (ALDACTONE) 25 MG tablet  Take 1 tablet (25 mg total) by mouth once daily.    tiotropium (SPIRIVA WITH HANDIHALER) 18 mcg inhalation capsule Inhale 1 capsule (18 mcg total) into the lungs once daily.    VENTOLIN HFA 90 mcg/actuation inhaler Inhale 2 puffs into the lungs 2 (two) times daily as needed.     walker Misc 1 Device by Misc.(Non-Drug; Combo Route) route as needed.     No current facility-administered medications for this visit.     Facility-Administered Medications Ordered in Other Visits   Medication    0.9%  NaCl infusion    vancomycin in dextrose 5 % 1 gram/250 mL IVPB 1,000 mg       Review of Systems   Constitutional: Negative for malaise/fatigue.   Cardiovascular: Negative for chest pain, dyspnea on exertion, irregular heartbeat, leg swelling and palpitations.   Respiratory: Negative for shortness of breath.    Hematologic/Lymphatic: Negative for bleeding problem.   Skin: Negative for rash.   Musculoskeletal: Negative for myalgias.   Gastrointestinal: Negative for hematemesis, hematochezia and nausea.   Genitourinary: Negative for hematuria.   Neurological: Negative for light-headedness.   Psychiatric/Behavioral: Negative for altered mental status.   Allergic/Immunologic: Negative for persistent infections.       Objective:          BP (!) 144/82   Pulse 82   LMP  (LMP Unknown)     Physical Exam  Vitals and nursing note reviewed.   Constitutional:       Appearance: Normal appearance. She is well-developed.   HENT:      Head: Normocephalic.      Nose: Nose normal.   Eyes:      Pupils: Pupils are equal, round, and reactive to light.   Cardiovascular:      Rate and Rhythm: Normal rate and regular rhythm.   Pulmonary:      Effort: No respiratory distress.      Breath sounds: Normal breath sounds.   Chest:      Comments: Device to LUCW. Incision and pocket in good repair.    Musculoskeletal:         General: Normal range of motion.   Skin:     General: Skin is warm and dry.      Findings: No erythema.   Neurological:       Mental Status: She is alert and oriented to person, place, and time.   Psychiatric:         Speech: Speech normal.         Behavior: Behavior normal.           Lab Results   Component Value Date     04/26/2022    K 4.4 04/26/2022    MG 1.9 02/15/2022    BUN 52 (H) 04/26/2022    CREATININE 2.1 (H) 04/26/2022    ALT 15 04/26/2022    AST 20 04/26/2022    HGB 8.9 (L) 04/06/2022    HCT 30.0 (L) 04/06/2022    HCT 45 12/07/2021    TSH 1.561 12/04/2021    LDLCALC 33.2 (L) 12/03/2021       Recent Labs   Lab 10/26/21  1715 11/28/21  0549 11/30/21  0426 12/04/21  0725   INR 1.0 1.0 1.0 1.0         Assessment:     1. Chronic combined systolic and diastolic heart failure    2. Essential hypertension    3. Left ventricular hypertrophy    4. NICM (nonischemic cardiomyopathy)    5. Paroxysmal atrial fibrillation    6. ICD (implantable cardioverter-defibrillator) in place    7. MELISSA (obstructive sleep apnea)      Plan:     In summary, Ms. Hawley is a 56 y.o. female with SVT (RFA 5/2019), NICM, CHF, HTN, Sleep Apnea, paroxysmal atrial fibrillation (on eliquis), Fibromyalgia, ICD, alpha thalassemia here for follow up.   Ms. Hawley is doing well from a device perspective with stable lead and device function. No RV pacing. Echo 11/2021 showing EF 25% which is unchanged. No sustained arrhythmia noted. No ST/SVT since last 8/2021. Not sure why Dr. Cortez stopped metoprolol. Will obtain records. She is following up with [facility] later today.    Obtain records re: metoprolol  Continue current medication regimen and device settings.   Follow up in device clinic as scheduled.   Follow up in EP clinic in 1 year, sooner as needed.     *A copy of this note has been sent to Dr. Pretty*    Follow up in about 1 year (around 4/26/2023).    ------------------------------------------------------------------    LYNN Fields, NP-C  Cardiac Electrophysiology

## 2022-04-13 ENCOUNTER — PATIENT OUTREACH (OUTPATIENT)
Dept: ADMINISTRATIVE | Facility: HOSPITAL | Age: 57
End: 2022-04-13
Payer: MEDICAID

## 2022-04-25 ENCOUNTER — PATIENT OUTREACH (OUTPATIENT)
Dept: ADMINISTRATIVE | Facility: OTHER | Age: 57
End: 2022-04-25
Payer: MEDICAID

## 2022-04-25 ENCOUNTER — TELEPHONE (OUTPATIENT)
Dept: ELECTROPHYSIOLOGY | Facility: CLINIC | Age: 57
End: 2022-04-25
Payer: MEDICAID

## 2022-04-25 ENCOUNTER — TELEPHONE (OUTPATIENT)
Dept: ENDOSCOPY | Facility: HOSPITAL | Age: 57
End: 2022-04-25
Payer: MEDICAID

## 2022-04-25 NOTE — TELEPHONE ENCOUNTER
----- Message from Peri Lopez, Patient Care Assistant sent at 4/25/2022 12:43 PM CDT -----  Regarding: procedure time  Contact: Pt  Pt is requesting a call back in regards to time of procedure.      Pt @ [phone]

## 2022-04-25 NOTE — PROGRESS NOTES
Requested updates within Care Everywhere.  Patient's chart was reviewed for overdue ANGELA topics.  Health maintenance:updated  Immunizations:reconciled   Legacy:   Media:  Orders placed:  Tasked appts:  Labs Linked:  Upcoming appt:Colonoscopy 4/27/22, PCP 6/21/22

## 2022-04-25 NOTE — TELEPHONE ENCOUNTER
----- Message from Peri Lopez, Patient Care Assistant sent at 4/25/2022 12:43 PM CDT -----  Regarding: procedure time  Contact: Pt  Pt is requesting a call back in regards to time of procedure.      Pt @ [phone]

## 2022-04-26 ENCOUNTER — CLINICAL SUPPORT (OUTPATIENT)
Dept: CARDIOLOGY | Facility: HOSPITAL | Age: 57
End: 2022-04-26
Attending: INTERNAL MEDICINE
Payer: MEDICAID

## 2022-04-26 ENCOUNTER — OFFICE VISIT (OUTPATIENT)
Dept: TRANSPLANT | Facility: CLINIC | Age: 57
End: 2022-04-26
Attending: INTERNAL MEDICINE
Payer: MEDICAID

## 2022-04-26 ENCOUNTER — HOSPITAL ENCOUNTER (OUTPATIENT)
Dept: CARDIOLOGY | Facility: CLINIC | Age: 57
Discharge: HOME | End: 2022-04-26
Payer: MEDICAID

## 2022-04-26 ENCOUNTER — PATIENT MESSAGE (OUTPATIENT)
Dept: ENDOSCOPY | Facility: HOSPITAL | Age: 57
End: 2022-04-26
Payer: MEDICAID

## 2022-04-26 ENCOUNTER — OFFICE VISIT (OUTPATIENT)
Dept: ELECTROPHYSIOLOGY | Facility: CLINIC | Age: 57
End: 2022-04-26
Payer: MEDICAID

## 2022-04-26 VITALS
HEART RATE: 82 BPM | SYSTOLIC BLOOD PRESSURE: 150 MMHG | SYSTOLIC BLOOD PRESSURE: 144 MMHG | HEIGHT: 66 IN | DIASTOLIC BLOOD PRESSURE: 82 MMHG | HEART RATE: 82 BPM | DIASTOLIC BLOOD PRESSURE: 82 MMHG | WEIGHT: 205.25 LBS | BODY MASS INDEX: 32.99 KG/M2

## 2022-04-26 DIAGNOSIS — I42.8 NICM (NONISCHEMIC CARDIOMYOPATHY): Chronic | ICD-10-CM

## 2022-04-26 DIAGNOSIS — I50.42 CHRONIC COMBINED SYSTOLIC AND DIASTOLIC HEART FAILURE: Primary | ICD-10-CM

## 2022-04-26 DIAGNOSIS — I13.0 HYPERTENSIVE CARDIOVASCULAR-RENAL DISEASE, STAGE 1-4 OR UNSPECIFIED CHRONIC KIDNEY DISEASE, WITH HEART FAILURE: ICD-10-CM

## 2022-04-26 DIAGNOSIS — G47.33 OSA (OBSTRUCTIVE SLEEP APNEA): Chronic | ICD-10-CM

## 2022-04-26 DIAGNOSIS — Z95.810 ICD (IMPLANTABLE CARDIOVERTER-DEFIBRILLATOR) IN PLACE: Primary | Chronic | ICD-10-CM

## 2022-04-26 DIAGNOSIS — I42.8 NICM (NONISCHEMIC CARDIOMYOPATHY): ICD-10-CM

## 2022-04-26 DIAGNOSIS — N18.4 CKD (CHRONIC KIDNEY DISEASE), STAGE IV: ICD-10-CM

## 2022-04-26 DIAGNOSIS — I10 ESSENTIAL HYPERTENSION: Chronic | ICD-10-CM

## 2022-04-26 DIAGNOSIS — I50.42 CHRONIC COMBINED SYSTOLIC AND DIASTOLIC HEART FAILURE: ICD-10-CM

## 2022-04-26 DIAGNOSIS — Z12.11 COLON CANCER SCREENING: Primary | ICD-10-CM

## 2022-04-26 DIAGNOSIS — J98.4 RESTRICTIVE LUNG DISEASE: ICD-10-CM

## 2022-04-26 DIAGNOSIS — I51.7 LEFT VENTRICULAR HYPERTROPHY: Chronic | ICD-10-CM

## 2022-04-26 DIAGNOSIS — I48.0 PAROXYSMAL ATRIAL FIBRILLATION: Chronic | ICD-10-CM

## 2022-04-26 PROCEDURE — 4010F PR ACE/ARB THEARPY RXD/TAKEN: ICD-10-PCS | Mod: CPTII,,, | Performed by: NURSE PRACTITIONER

## 2022-04-26 PROCEDURE — 99214 OFFICE O/P EST MOD 30 MIN: CPT | Mod: S$PBB,,, | Performed by: INTERNAL MEDICINE

## 2022-04-26 PROCEDURE — 3066F NEPHROPATHY DOC TX: CPT | Mod: CPTII,,, | Performed by: NURSE PRACTITIONER

## 2022-04-26 PROCEDURE — 3077F PR MOST RECENT SYSTOLIC BLOOD PRESSURE >= 140 MM HG: ICD-10-PCS | Mod: CPTII,,, | Performed by: INTERNAL MEDICINE

## 2022-04-26 PROCEDURE — 93282 PRGRMG EVAL IMPLANTABLE DFB: CPT

## 2022-04-26 PROCEDURE — 3079F PR MOST RECENT DIASTOLIC BLOOD PRESSURE 80-89 MM HG: ICD-10-PCS | Mod: CPTII,,, | Performed by: NURSE PRACTITIONER

## 2022-04-26 PROCEDURE — 3066F NEPHROPATHY DOC TX: CPT | Mod: CPTII,,, | Performed by: INTERNAL MEDICINE

## 2022-04-26 PROCEDURE — 99999 PR PBB SHADOW E&M-EST. PATIENT-LVL IV: CPT | Mod: PBBFAC,,, | Performed by: NURSE PRACTITIONER

## 2022-04-26 PROCEDURE — 3044F HG A1C LEVEL LT 7.0%: CPT | Mod: CPTII,,, | Performed by: INTERNAL MEDICINE

## 2022-04-26 PROCEDURE — 3044F HG A1C LEVEL LT 7.0%: CPT | Mod: CPTII,,, | Performed by: NURSE PRACTITIONER

## 2022-04-26 PROCEDURE — 3077F SYST BP >= 140 MM HG: CPT | Mod: CPTII,,, | Performed by: NURSE PRACTITIONER

## 2022-04-26 PROCEDURE — 3077F PR MOST RECENT SYSTOLIC BLOOD PRESSURE >= 140 MM HG: ICD-10-PCS | Mod: CPTII,,, | Performed by: NURSE PRACTITIONER

## 2022-04-26 PROCEDURE — 99999 PR PBB SHADOW E&M-EST. PATIENT-LVL IV: CPT | Mod: PBBFAC,,, | Performed by: INTERNAL MEDICINE

## 2022-04-26 PROCEDURE — 3044F PR MOST RECENT HEMOGLOBIN A1C LEVEL <7.0%: ICD-10-PCS | Mod: CPTII,,, | Performed by: INTERNAL MEDICINE

## 2022-04-26 PROCEDURE — 3066F PR DOCUMENTATION OF TREATMENT FOR NEPHROPATHY: ICD-10-PCS | Mod: CPTII,,, | Performed by: NURSE PRACTITIONER

## 2022-04-26 PROCEDURE — 3044F PR MOST RECENT HEMOGLOBIN A1C LEVEL <7.0%: ICD-10-PCS | Mod: CPTII,,, | Performed by: NURSE PRACTITIONER

## 2022-04-26 PROCEDURE — 99999 PR PBB SHADOW E&M-EST. PATIENT-LVL IV: ICD-10-PCS | Mod: PBBFAC,,, | Performed by: INTERNAL MEDICINE

## 2022-04-26 PROCEDURE — 4010F ACE/ARB THERAPY RXD/TAKEN: CPT | Mod: CPTII,,, | Performed by: NURSE PRACTITIONER

## 2022-04-26 PROCEDURE — 99214 PR OFFICE/OUTPT VISIT, EST, LEVL IV, 30-39 MIN: ICD-10-PCS | Mod: S$PBB,,, | Performed by: NURSE PRACTITIONER

## 2022-04-26 PROCEDURE — 93005 ELECTROCARDIOGRAM TRACING: CPT | Mod: PBBFAC | Performed by: INTERNAL MEDICINE

## 2022-04-26 PROCEDURE — 3079F PR MOST RECENT DIASTOLIC BLOOD PRESSURE 80-89 MM HG: ICD-10-PCS | Mod: CPTII,,, | Performed by: INTERNAL MEDICINE

## 2022-04-26 PROCEDURE — 1159F PR MEDICATION LIST DOCUMENTED IN MEDICAL RECORD: ICD-10-PCS | Mod: CPTII,,, | Performed by: INTERNAL MEDICINE

## 2022-04-26 PROCEDURE — 99214 PR OFFICE/OUTPT VISIT, EST, LEVL IV, 30-39 MIN: ICD-10-PCS | Mod: S$PBB,,, | Performed by: INTERNAL MEDICINE

## 2022-04-26 PROCEDURE — 1160F RVW MEDS BY RX/DR IN RCRD: CPT | Mod: CPTII,,, | Performed by: INTERNAL MEDICINE

## 2022-04-26 PROCEDURE — 1159F MED LIST DOCD IN RCRD: CPT | Mod: CPTII,,, | Performed by: INTERNAL MEDICINE

## 2022-04-26 PROCEDURE — 3008F BODY MASS INDEX DOCD: CPT | Mod: CPTII,,, | Performed by: INTERNAL MEDICINE

## 2022-04-26 PROCEDURE — 99999 PR PBB SHADOW E&M-EST. PATIENT-LVL IV: ICD-10-PCS | Mod: PBBFAC,,, | Performed by: NURSE PRACTITIONER

## 2022-04-26 PROCEDURE — 93282 PRGRMG EVAL IMPLANTABLE DFB: CPT | Mod: 26,,, | Performed by: INTERNAL MEDICINE

## 2022-04-26 PROCEDURE — 93010 RHYTHM STRIP: ICD-10-PCS | Mod: S$PBB,,, | Performed by: INTERNAL MEDICINE

## 2022-04-26 PROCEDURE — 93282 CARDIAC DEVICE CHECK - IN CLINIC & HOSPITAL: ICD-10-PCS | Mod: 26,,, | Performed by: INTERNAL MEDICINE

## 2022-04-26 PROCEDURE — 99214 OFFICE O/P EST MOD 30 MIN: CPT | Mod: PBBFAC,27 | Performed by: INTERNAL MEDICINE

## 2022-04-26 PROCEDURE — 4010F ACE/ARB THERAPY RXD/TAKEN: CPT | Mod: CPTII,,, | Performed by: INTERNAL MEDICINE

## 2022-04-26 PROCEDURE — 4010F PR ACE/ARB THEARPY RXD/TAKEN: ICD-10-PCS | Mod: CPTII,,, | Performed by: INTERNAL MEDICINE

## 2022-04-26 PROCEDURE — 93010 ELECTROCARDIOGRAM REPORT: CPT | Mod: S$PBB,,, | Performed by: INTERNAL MEDICINE

## 2022-04-26 PROCEDURE — 3079F DIAST BP 80-89 MM HG: CPT | Mod: CPTII,,, | Performed by: INTERNAL MEDICINE

## 2022-04-26 PROCEDURE — 1159F PR MEDICATION LIST DOCUMENTED IN MEDICAL RECORD: ICD-10-PCS | Mod: CPTII,,, | Performed by: NURSE PRACTITIONER

## 2022-04-26 PROCEDURE — 1160F PR REVIEW ALL MEDS BY PRESCRIBER/CLIN PHARMACIST DOCUMENTED: ICD-10-PCS | Mod: CPTII,,, | Performed by: INTERNAL MEDICINE

## 2022-04-26 PROCEDURE — 99214 OFFICE O/P EST MOD 30 MIN: CPT | Mod: PBBFAC | Performed by: NURSE PRACTITIONER

## 2022-04-26 PROCEDURE — 1159F MED LIST DOCD IN RCRD: CPT | Mod: CPTII,,, | Performed by: NURSE PRACTITIONER

## 2022-04-26 PROCEDURE — 3079F DIAST BP 80-89 MM HG: CPT | Mod: CPTII,,, | Performed by: NURSE PRACTITIONER

## 2022-04-26 PROCEDURE — 99214 OFFICE O/P EST MOD 30 MIN: CPT | Mod: S$PBB,,, | Performed by: NURSE PRACTITIONER

## 2022-04-26 PROCEDURE — 3077F SYST BP >= 140 MM HG: CPT | Mod: CPTII,,, | Performed by: INTERNAL MEDICINE

## 2022-04-26 PROCEDURE — 3008F PR BODY MASS INDEX (BMI) DOCUMENTED: ICD-10-PCS | Mod: CPTII,,, | Performed by: INTERNAL MEDICINE

## 2022-04-26 PROCEDURE — 3066F PR DOCUMENTATION OF TREATMENT FOR NEPHROPATHY: ICD-10-PCS | Mod: CPTII,,, | Performed by: INTERNAL MEDICINE

## 2022-04-26 RX ORDER — POLYETHYLENE GLYCOL 3350, SODIUM SULFATE ANHYDROUS, SODIUM BICARBONATE, SODIUM CHLORIDE, POTASSIUM CHLORIDE 236; 22.74; 6.74; 5.86; 2.97 G/4L; G/4L; G/4L; G/4L; G/4L
4 POWDER, FOR SOLUTION ORAL ONCE
Qty: 4000 ML | Refills: 0 | Status: SHIPPED | OUTPATIENT
Start: 2022-04-26 | End: 2022-04-26

## 2022-04-26 RX ORDER — METOPROLOL SUCCINATE 100 MG/1
100 TABLET, EXTENDED RELEASE ORAL DAILY
COMMUNITY
End: 2023-01-05

## 2022-04-26 NOTE — PROGRESS NOTES
"Subjective:     Patient ID:  Hafsa Hawley is a 56 y.o. female who presents for follow-up of the selection committee's decision.    HPI:  57 yo BF with stage D CHF and CKD returns for visit to discuss selection committee denial.  She was declined for LVAD and heart tx at selection 3/9/22--see note. She takes exception to the statement, "difficulty consistently following up with the team."  She tells me today that she is seeing pulmonologist and a nephrologist.  She has also seen Dr. Cortez who reduced metoprolol succinate to 100 mg a day.  She was initially confused on metoprolol and metolazone but I believe that we were able to come to the correct does of metoprolol.       She reports no changes in her symptoms.  Objective:   Physical Exam  Constitutional:       General: She is not in acute distress.     Appearance: She is well-developed. She is obese. She is not ill-appearing, toxic-appearing or diaphoretic.      Comments: BP (!) 150/82 (BP Location: Left arm, Patient Position: Sitting, BP Method: Large (Automatic))   Pulse 82   Ht 5' 6" (1.676 m)   Wt 93.1 kg (205 lb 4 oz)   LMP  (LMP Unknown)   BMI 33.13 kg/m²   Last visit wt 204 lb   Obese BF in NAD   HENT:      Head: Normocephalic and atraumatic.   Eyes:      General: No scleral icterus.        Right eye: No discharge.         Left eye: No discharge.      Conjunctiva/sclera: Conjunctivae normal.   Neck:      Thyroid: No thyromegaly.      Vascular: No JVD (10 cm water).      Trachea: No tracheal deviation.   Cardiovascular:      Rate and Rhythm: Normal rate and regular rhythm.      Heart sounds: Normal heart sounds. No murmur heard.    No gallop.   Pulmonary:      Effort: Pulmonary effort is normal.      Breath sounds: Normal breath sounds.   Abdominal:      General: Bowel sounds are normal. There is no distension.      Palpations: Abdomen is soft. There is no mass.      Tenderness: There is no abdominal tenderness. There is no guarding or rebound. "   Musculoskeletal:         General: No swelling or tenderness.      Right lower leg: No edema.      Left lower leg: No edema.   Skin:     General: Skin is warm and dry.   Neurological:      Mental Status: She is alert.        Lab Results   Component Value Date    BNP 1,432 (H) 12/08/2021     04/26/2022    K 4.4 04/26/2022    MG 1.9 02/15/2022     04/26/2022    CO2 26 04/26/2022    PHOS 4.2 02/15/2022    BUN 52 (H) 04/26/2022    CREATININE 2.1 (H) 04/26/2022     (H) 04/26/2022    HGBA1C 5.7 (H) 02/15/2022    AST 20 04/26/2022    ALT 15 04/26/2022    ALBUMIN 3.9 04/26/2022    PROT 7.7 04/26/2022    BILITOT 1.4 (H) 04/26/2022    WBC 4.71 04/06/2022    HGB 8.9 (L) 04/06/2022    HCT 30.0 (L) 04/06/2022    HCT 45 12/07/2021     04/06/2022    INR 1.0 12/04/2021     02/10/2022    TSH 1.561 12/04/2021    K4XWYLS 8.4 09/11/2017    FREET4 1.07 05/09/2018    CHOL 110 (L) 12/03/2021    HDL 62 12/03/2021    LDLCALC 33.2 (L) 12/03/2021    TRIG 74 12/03/2021         Assessment:     1. Chronic combined systolic and diastolic heart failure    2. NICM (nonischemic cardiomyopathy)    3. Hypertensive cardiovascular-renal disease, stage 1-4 or unspecified chronic kidney disease, with heart failure    4. CKD (chronic kidney disease), stage IV    5. Essential hypertension    6. Paroxysmal atrial fibrillation    7. Restrictive lung disease      Plan:   Dr. Benson Cortez is following her CHF now.  He stopped the spironolactone and reduced metoprolol succinate 100 mg/day.  She does not have her meds nor a list of meds.  Her BP is up today but she is upset covering the selection committee decision.  She is particularly upset by the inference in the note regarding her compliance.    She could follow with Dr. Cortez and be referred back should pulmonary and renal status improve and she demonstrates compliance with his treatment.  This will reduce her expenses for her medical care.  If she chooses to continue  to follow here regularly my suggestion would be that she mainly see Giovani Cardenas and Alisa so that her compliance can be more accurately judged.    Total duration of face to face visit time 30 minutes.  Total time spent counseling greater than fifty percent of total visit time.  Counseling included discussion regarding imaging findings, diagnosis, possibilities, treatment options, risks and benefits.  The patient had many questions regarding the options and long-term effects.

## 2022-04-26 NOTE — LETTER
April 26, 2022        Benson Cortez  [address] [place] [id number]  Phone: [phone]  Fax: [phone]             [id number]  [address]  [place] [address]  Phone: [phone]   Patient: Hafsa Hawley   MR Number: [id number]   YOB: 1965   Date of Visit: 4/26/2022       Dear Dr. Benson Cortez    Thank you for referring Hafsa Hawley to me for evaluation. Attached you will find relevant portions of my assessment and plan of care.    If you have questions, please do not hesitate to call me. I look forward to following Hafsa Hawley along with you.    Sincerely,    Jake Orozco Jr, MD    Enclosure    If you would like to receive this communication electronically, please contact [email or web address] or [phone] to request [employer] Link access.    [employer] Link is a tool which provides read-only access to select patient information with whom you have a relationship. Its easy to use and provides real time access to review your patients record including encounter summaries, notes, results, and demographic information.    If you feel you have received this communication in error or would no longer like to receive these types of communications, please e-mail [email or web address]

## 2022-04-26 NOTE — TELEPHONE ENCOUNTER
Called patient regarding her procedure time.   She also didn't prep for her colonoscopy after talking with her.   Patient rescheduled since she couldn't locate her prep instructions in e-mail originally.    Verbally reviewed prep diet the day before, dulocolax at 12pm and her prescription prep to get from [facility]. Informed her I am sending via portal and email and she needs to check and read it.    She knows to hold her Eliquis 2 days prior to procedure and that she needs a ride.    Emailed to [email or web address]

## 2022-04-27 ENCOUNTER — PATIENT MESSAGE (OUTPATIENT)
Dept: TRANSPLANT | Facility: CLINIC | Age: 57
End: 2022-04-27
Payer: MEDICAID

## 2022-04-27 LAB
LEFT EYE DM RETINOPATHY: NEGATIVE
RIGHT EYE DM RETINOPATHY: NEGATIVE

## 2022-05-01 ENCOUNTER — CLINICAL SUPPORT (OUTPATIENT)
Dept: CARDIOLOGY | Facility: HOSPITAL | Age: 57
End: 2022-05-01
Payer: MEDICAID

## 2022-05-01 DIAGNOSIS — Z95.810 PRESENCE OF AUTOMATIC (IMPLANTABLE) CARDIAC DEFIBRILLATOR: ICD-10-CM

## 2022-05-01 PROCEDURE — 93296 REM INTERROG EVL PM/IDS: CPT | Performed by: INTERNAL MEDICINE

## 2022-05-05 ENCOUNTER — TELEPHONE (OUTPATIENT)
Dept: NEPHROLOGY | Facility: CLINIC | Age: 57
End: 2022-05-05
Payer: MEDICAID

## 2022-05-05 NOTE — TELEPHONE ENCOUNTER
----- Message from Sue Rosenbaum sent at 5/5/2022 12:47 PM CDT -----  Contact: @[phone]  Pt requesting a call back to reschedule her appt today 05/05/22.  Pt states she has been doing too much and need to reschedule.  I attempted to reschedule but did not have access.

## 2022-05-06 ENCOUNTER — HOSPITAL ENCOUNTER (OUTPATIENT)
Facility: HOSPITAL | Age: 57
Discharge: HOME | End: 2022-05-06
Attending: INTERNAL MEDICINE | Admitting: INTERNAL MEDICINE
Payer: MEDICAID

## 2022-05-06 VITALS
HEART RATE: 66 BPM | OXYGEN SATURATION: 97 % | SYSTOLIC BLOOD PRESSURE: 136 MMHG | RESPIRATION RATE: 17 BRPM | TEMPERATURE: 98 F | WEIGHT: 205 LBS | HEIGHT: 66 IN | BODY MASS INDEX: 32.95 KG/M2 | DIASTOLIC BLOOD PRESSURE: 87 MMHG

## 2022-05-06 DIAGNOSIS — Z12.11 SCREENING FOR COLON CANCER: ICD-10-CM

## 2022-05-06 LAB — POCT GLUCOSE: 161 MG/DL (ref 70–110)

## 2022-05-06 PROCEDURE — 00811 ANES LWR INTST NDSC NOS: CPT | Performed by: INTERNAL MEDICINE

## 2022-05-06 PROCEDURE — 45385 COLONOSCOPY W/LESION REMOVAL: CPT | Mod: ,,, | Performed by: INTERNAL MEDICINE

## 2022-05-06 PROCEDURE — 88305 TISSUE EXAM BY PATHOLOGIST: CPT | Performed by: PATHOLOGY

## 2022-05-06 PROCEDURE — 37000008 HC ANESTHESIA 1ST 15 MINUTES: Performed by: INTERNAL MEDICINE

## 2022-05-06 PROCEDURE — 37000009 HC ANESTHESIA EA ADD 15 MINS: Performed by: INTERNAL MEDICINE

## 2022-05-06 PROCEDURE — 82962 GLUCOSE BLOOD TEST: CPT | Performed by: INTERNAL MEDICINE

## 2022-05-06 PROCEDURE — D9220A PRA ANESTHESIA: ICD-10-PCS | Mod: ANES,,, | Performed by: ANESTHESIOLOGY

## 2022-05-06 PROCEDURE — 45385 COLONOSCOPY W/LESION REMOVAL: CPT | Performed by: INTERNAL MEDICINE

## 2022-05-06 PROCEDURE — 27201089 HC SNARE, DISP (ANY): Performed by: INTERNAL MEDICINE

## 2022-05-06 PROCEDURE — 88305 TISSUE EXAM BY PATHOLOGIST: ICD-10-PCS | Mod: 26,,, | Performed by: PATHOLOGY

## 2022-05-06 PROCEDURE — D9220A PRA ANESTHESIA: Mod: ANES,,, | Performed by: ANESTHESIOLOGY

## 2022-05-06 PROCEDURE — 88305 TISSUE EXAM BY PATHOLOGIST: CPT | Mod: 26,,, | Performed by: PATHOLOGY

## 2022-05-06 PROCEDURE — 63600175 PHARM REV CODE 636 W HCPCS: Performed by: NURSE ANESTHETIST, CERTIFIED REGISTERED

## 2022-05-06 PROCEDURE — D9220A PRA ANESTHESIA: Mod: CRNA,,, | Performed by: NURSE ANESTHETIST, CERTIFIED REGISTERED

## 2022-05-06 PROCEDURE — 25000003 PHARM REV CODE 250: Performed by: NURSE ANESTHETIST, CERTIFIED REGISTERED

## 2022-05-06 PROCEDURE — D9220A PRA ANESTHESIA: ICD-10-PCS | Mod: CRNA,,, | Performed by: NURSE ANESTHETIST, CERTIFIED REGISTERED

## 2022-05-06 PROCEDURE — 45385 PR COLONOSCOPY,REMV LESN,SNARE: ICD-10-PCS | Mod: ,,, | Performed by: INTERNAL MEDICINE

## 2022-05-06 RX ORDER — ONDANSETRON 2 MG/ML
4 INJECTION INTRAMUSCULAR; INTRAVENOUS DAILY PRN
Status: DISCONTINUED | OUTPATIENT
Start: 2022-05-06 | End: 2022-05-06 | Stop reason: HOSPADM

## 2022-05-06 RX ORDER — ETOMIDATE 2 MG/ML
INJECTION INTRAVENOUS
Status: DISCONTINUED | OUTPATIENT
Start: 2022-05-06 | End: 2022-05-06

## 2022-05-06 RX ORDER — LIDOCAINE HYDROCHLORIDE 20 MG/ML
INJECTION INTRAVENOUS
Status: DISCONTINUED | OUTPATIENT
Start: 2022-05-06 | End: 2022-05-06

## 2022-05-06 RX ORDER — PROPOFOL 10 MG/ML
VIAL (ML) INTRAVENOUS
Status: DISCONTINUED | OUTPATIENT
Start: 2022-05-06 | End: 2022-05-06

## 2022-05-06 RX ORDER — PROCHLORPERAZINE EDISYLATE 5 MG/ML
5 INJECTION INTRAMUSCULAR; INTRAVENOUS EVERY 30 MIN PRN
Status: DISCONTINUED | OUTPATIENT
Start: 2022-05-06 | End: 2022-05-06 | Stop reason: HOSPADM

## 2022-05-06 RX ADMIN — SODIUM CHLORIDE: 0.9 INJECTION, SOLUTION INTRAVENOUS at 07:05

## 2022-05-06 RX ADMIN — PROPOFOL 20 MG: 10 INJECTION, EMULSION INTRAVENOUS at 07:05

## 2022-05-06 RX ADMIN — LIDOCAINE HYDROCHLORIDE 50 MG: 20 INJECTION, SOLUTION INTRAVENOUS at 07:05

## 2022-05-06 RX ADMIN — PROPOFOL 100 MCG/KG/MIN: 10 INJECTION, EMULSION INTRAVENOUS at 07:05

## 2022-05-06 RX ADMIN — ETOMIDATE 4 MG: 2 INJECTION, SOLUTION INTRAVENOUS at 07:05

## 2022-05-06 NOTE — TRANSFER OF CARE
"Anesthesia Transfer of Care Note    Patient: Hafsa Hawley    Procedure(s) Performed: Procedure(s) (LRB):  COLONOSCOPY (N/A)    Patient location: PACU    Anesthesia Type: general    Transport from OR: Transported from OR on 6-10 L/min O2 by face mask with adequate spontaneous ventilation    Post pain: adequate analgesia    Post assessment: tolerated procedure well and no apparent anesthetic complications    Post vital signs: stable    Level of consciousness: awake and alert    Nausea/Vomiting: no nausea/vomiting    Complications: none    Transfer of care protocol was followed      Last vitals:   Visit Vitals  BP (!) 168/96 (BP Location: Left arm, Patient Position: Lying)   Pulse 81   Temp 36.3 °C (97.3 °F) (Temporal)   Resp 14   Ht 5' 6" (1.676 m)   Wt 93 kg (205 lb)   LMP  (LMP Unknown)   SpO2 100%   Breastfeeding No   BMI 33.09 kg/m²     "

## 2022-05-06 NOTE — ANESTHESIA POSTPROCEDURE EVALUATION
Anesthesia Post Evaluation    Patient: Hafsa Hawely    Procedure(s) Performed: Procedure(s) (LRB):  COLONOSCOPY (N/A)    Final Anesthesia Type: general      Patient location during evaluation: [facility]  Patient participation: Yes- Able to Participate  Level of consciousness: awake and alert  Post-procedure vital signs: reviewed and stable  Pain management: adequate  Airway patency: patent    PONV status at discharge: No PONV  Anesthetic complications: no      Cardiovascular status: stable  Respiratory status: unassisted and spontaneous ventilation  Hydration status: euvolemic  Follow-up not needed.          Vitals Value Taken Time   /87 05/06/22 0900   Temp 36.6 °C (97.9 °F) 05/06/22 0830   Pulse 66 05/06/22 0900   Resp 17 05/06/22 0900   SpO2 97 % 05/06/22 0900         No case tracking events are documented in the log.      Pain/Oralia Score: Oralia Score: 10 (5/6/2022  8:30 AM)

## 2022-05-06 NOTE — H&P
Short Stay Endoscopy History and Physical    PCP - Cristobal Ann MD  Referring Physician - Kezia Clifton RN  No address on file    Procedure - Colonoscopy  ASA - per anesthesia  Mallampati - per anesthesia  History of Anesthesia problems - no  Family history Anesthesia problems -  no   Plan of anesthesia - General    HPI  56 y.o. female  Reason for procedure:   CRC screening    ROS:  Constitutional: No fevers, chills, No weight loss  CV: No chest pain  Pulm: No cough, No shortness of breath  GI: see HPI    Medical History:  has a past medical history of Anemia, Anticoagulant long-term use, Arthritis, Atrial fibrillation, CKD (chronic kidney disease), stage IV (5/8/2018), Congestive heart failure, COPD (chronic obstructive pulmonary disease), Deep vein thrombosis, elevated bilirubin d/t Gilbert's syndrome, Encounter for blood transfusion, GERD (gastroesophageal reflux disease), Hypertension, Hypertensive cardiovascular-renal disease, stage 1-4 or unspecified chronic kidney disease, with heart failure (3/4/2022), Pheochromocytoma, malignant, Restrictive lung disease (4/26/2022), Right kidney mass, Sleep apnea, Thalassemia trait, alpha, Thyroid disease, and Type 2 diabetes mellitus with hyperglycemia, without long-term current use of insulin (8/13/2020).    Surgical History:  has a past surgical history that includes Appendectomy; Hysterectomy; Cardiac defibrillator placement (Left, 12/2016); Fracture surgery (Left); Eye surgery; Knee surgery (Left, 2016); Transjugular biopsy of liver (N/A, 10/24/2018); Liver biopsy (10/24/2018); Bone marrow biopsy; Cardiac electrophysiology mapping and ablation; and Right heart catheterization (Right, 12/7/2021).    Family History: family history includes Cancer in her mother; Cirrhosis in her brother; Diabetes in her brother; Heart attack in her father; Heart disease in her brother, brother, father, sister, and sister; Hypertension in her brother and father..    Social History:   "reports that she has never smoked. She has never used smokeless tobacco. She reports current alcohol use. She reports that she does not use drugs.    Review of patient's allergies indicates:   Allergen Reactions    Penicillins Hives    Iodinated contrast media Nausea And Vomiting    Oxycodone-acetaminophen Other (See Comments)     Nausea, Dizziness, Anxiety.  "I don't like how it makes me feel."   Given Hydromorphone 0.5mg IVP  Without problems.  Other reaction(s): Other (See Comments)    Diovan hct [valsartan-hydrochlorothiazide] Other (See Comments)     Causes coughing    Irinotecan      Pt has homozygosity for the TA7 promoter variant that places this individual at significantly increased risk for   severe neutropenia (grade 4) when treated with the standard dose of irinotecan (risk approximately 50%).   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 Metabolism of other drugs not listed here may also be impacted by UGT1A1 enzyme activity.       Tramadol Nausea And Vomiting     Other reaction(s): Other (See Comments)       Medications:   Medications Prior to Admission   Medication Sig Dispense Refill Last Dose    allopurinoL (ZYLOPRIM) 100 MG tablet Take 1 tablet (100 mg total) by mouth once daily. 30 tablet 1     ALPRAZolam (XANAX) 2 MG Tab Take 2 mg by mouth 2 (two) times daily as needed.       atorvastatin (LIPITOR) 40 MG tablet Take 1 tablet (40 mg total) by mouth every evening. 90 tablet 3     b complex vitamins tablet Take 1 tablet by mouth once daily.       blood sugar diagnostic (ACCU-CHEK GUIDE TEST STRIPS) Strp 1 strip by Other route 3 (three) times daily. 100 each 11     blood sugar diagnostic (BLOOD GLUCOSE TEST) Strp 1 strip by Misc.(Non-Drug; Combo Route) route 3 (three) times daily. 100 strip 11     blood-glucose meter kit Use as instructed 1 each 0     diazePAM " (VALIUM) 5 MG tablet Take 5 mg by mouth 2 (two) times daily as needed.       diclofenac sodium (VOLTAREN) 1 % Gel APPLY 2 G TOPICALLY 3 (THREE) TIMES DAILY AS NEEDED (PAIN). 400 g 0     diltiazem HCl (DILTIAZEM 2% CREAM) Apply topically 3 (three) times daily. Apply topically to anal area. 50 g 5     ELIQUIS 5 mg Tab TAKE 1 TABLET BY MOUTH TWICE A DAY 60 tablet 3     empagliflozin (JARDIANCE) 25 mg tablet Jardiance 25 mg tablet       ENTRESTO  mg per tablet TAKE 1 TABLET BY MOUTH TWICE A DAY 60 tablet 5     ergocalciferol (ERGOCALCIFEROL) 50,000 unit Cap Take 50,000 Units by mouth every 7 days.       ferrous sulfate 325 (65 FE) MG EC tablet Take 1 tablet (325 mg total) by mouth once daily. 30 tablet 0     fluticasone propionate (FLONASE) 50 mcg/actuation nasal spray 2 sprays by Each Nostril route daily as needed for Rhinitis.        furosemide (LASIX) 80 MG tablet Take 1 tablet (80 mg total) by mouth once daily. Use afternoon dose if needed 60 tablet 11     glimepiride (AMARYL) 2 MG tablet Take 1 tablet (2 mg total) by mouth daily with breakfast. 30 tablet 2     hydrocortisone 2.5 % cream APPLY TOPICALLY 2 (TWO) TIMES DAILY AS NEEDED (HEMORRHOIDS). 28.35 g 0     lancets (ACCU-CHEK SOFTCLIX LANCETS) Misc 1 Device by Misc.(Non-Drug; Combo Route) route 3 (three) times daily. 100 each 11     linaCLOtide (LINZESS) 145 mcg Cap capsule Take 1 capsule (145 mcg total) by mouth before breakfast. Hold if diarrhea 30 capsule 11     metOLazone (ZAROXOLYN) 5 MG tablet Take 5 mg by mouth.       metoprolol succinate (TOPROL-XL) 100 MG 24 hr tablet Take 100 mg by mouth once daily.       metoprolol succinate (TOPROL-XL) 200 MG 24 hr tablet Take 1 tablet (200 mg total) by mouth 2 (two) times daily. (Patient not taking: No sig reported) 30 tablet 5     mometasone-formoterol (DULERA) 200-5 mcg/actuation inhaler Inhale 2 puffs into the lungs 2 (two) times daily. Controller 13 g 11     montelukast (SINGULAIR) 10 mg  tablet Take 1 tablet every day by oral route for 30 days.       NITROSTAT 0.4 mg SL tablet Take 2.5 tablets (1 mg total) by mouth every 5 (five) minutes as needed for Chest pain. No more than 3 tablets in 15 minutes.       nystatin (MYCOSTATIN) powder Apply topically 2 (two) times daily. 60 g 0     pantoprazole (PROTONIX) 40 MG tablet TAKE 1 TABLET BY MOUTH DAILY IN THE MORNING 30 tablet 11     polyethylene glycol (GLYCOLAX) 17 gram PwPk Take 17 g by mouth 3 (three) times daily as needed (constipation/hard stools). 100 each 11     rOPINIRole (REQUIP) 4 MG tablet Take 1 tablet (4 mg total) by mouth once daily. Pt taking 4mg daily 30 tablet 5     sertraline (ZOLOFT) 50 MG tablet Take 1 tablet (50 mg total) by mouth once daily. 30 tablet 11     spironolactone (ALDACTONE) 25 MG tablet Take 1 tablet (25 mg total) by mouth once daily. (Patient not taking: Reported on 4/26/2022) 30 tablet 11     tiotropium (SPIRIVA WITH HANDIHALER) 18 mcg inhalation capsule Inhale 1 capsule (18 mcg total) into the lungs once daily. 1 capsule 2     VENTOLIN HFA 90 mcg/actuation inhaler Inhale 2 puffs into the lungs 2 (two) times daily as needed.   11     walker Misc 1 Device by Misc.(Non-Drug; Combo Route) route as needed. 1 each 0        Physical Exam:    Vital Signs: There were no vitals filed for this visit.    General Appearance: Well appearing in no acute distress  Abdomen: Soft, non tender, non distended with normal bowel sounds, no masses    Labs:  Lab Results   Component Value Date    WBC 4.71 04/06/2022    HGB 8.9 (L) 04/06/2022    HCT 30.0 (L) 04/06/2022     04/06/2022    CHOL 110 (L) 12/03/2021    TRIG 74 12/03/2021    HDL 62 12/03/2021    ALT 15 04/26/2022    AST 20 04/26/2022     04/26/2022    K 4.4 04/26/2022     04/26/2022    CREATININE 2.1 (H) 04/26/2022    BUN 52 (H) 04/26/2022    CO2 26 04/26/2022    TSH 1.561 12/04/2021    INR 1.0 12/04/2021    HGBA1C 5.7 (H) 02/15/2022       I have explained  the risks and benefits of this endoscopic procedure to the patient including but not limited to bleeding, inflammation, infection, perforation, and death.      Peter Trinidad MD

## 2022-05-06 NOTE — [employer] PATIENT INSTRUCTIONS
Discharge Summary/Instructions after an Endoscopic Procedure  Patient Name: Hafsa Hawley  Patient MRN: [id number]  Patient YOB: 1965  Friday, May 6, 2022  Arely Betancourt MD  Dear patient,  As a result of recent federal legislation (The Federal Cures Act), you may   receive lab or pathology results from your procedure in your [facility]   account before your physician is able to contact you. Your physician or   their representative will relay the results to you with their   recommendations at their soonest availability.  Thank you,  RESTRICTIONS:  During your procedure today, you received medications for sedation.  These   medications may affect your judgment, balance and coordination.  Therefore,   for 24 hours, you have the following restrictions:   - DO NOT drive a car, operate machinery, make legal/financial decisions,   sign important papers or drink alcohol.    ACTIVITY:  Today: no heavy lifting, straining or running due to procedural   sedation/anesthesia.  The following day: return to full activity including work.  DIET:  Eat and drink normally unless instructed otherwise.     TREATMENT FOR COMMON SIDE EFFECTS:  - Mild abdominal pain, nausea, belching, bloating or excessive gas:  rest,   eat lightly and use a heating pad.  - Sore Throat: treat with throat lozenges and/or gargle with warm salt   water.  - Because air was used during the procedure, expelling large amounts of air   from your rectum or belching is normal.  - If a bowel prep was taken, you may not have a bowel movement for 1-3 days.    This is normal.  SYMPTOMS TO WATCH FOR AND REPORT TO YOUR PHYSICIAN:  1. Abdominal pain or bloating, other than gas cramps.  2. Chest pain.  3. Back pain.  4. Signs of infection such as: chills or fever occurring within 24 hours   after the procedure.  5. Rectal bleeding, which would show as bright red, maroon, or black stools.   (A tablespoon of blood from the rectum is not serious, especially if    hemorrhoids are present.)  6. Vomiting.  7. Weakness or dizziness.  GO DIRECTLY TO THE NEAREST EMERGENCY ROOM IF YOU HAVE ANY OF THE FOLLOWING:      Difficulty breathing              Chills and/or fever over 101 F   Persistent vomiting and/or vomiting blood   Severe abdominal pain   Severe chest pain   Black, tarry stools   Bleeding- more than one tablespoon   Any other symptom or condition that you feel may need urgent attention  Your doctor recommends these additional instructions:  If any biopsies were taken, your doctors clinic will contact you in 1 to 2   weeks with any results.  - Discharge patient to home.   - Resume previous diet.   - Continue present medications.   - Await pathology results.   - Repeat colonoscopy in 7 years for surveillance.  For questions, problems or results please call your physician - Arely Betancourt MD at Work:  [phone].  [facility] [place], EMERGENCY ROOM PHONE NUMBER: [phone]  IF A COMPLICATION OR EMERGENCY SITUATION ARISES AND YOU ARE UNABLE TO REACH   YOUR PHYSICIAN - GO DIRECTLY TO THE EMERGENCY ROOM.  Arely Betancourt MD  5/6/2022 8:35:18 AM  This report has been verified and signed electronically.  Dear patient,  As a result of recent federal legislation (The Federal Cures Act), you may   receive lab or pathology results from your procedure in your [facility]   account before your physician is able to contact you. Your physician or   their representative will relay the results to you with their   recommendations at their soonest availability.  Thank you,  [employer]

## 2022-05-08 NOTE — TELEPHONE ENCOUNTER
No new care gaps identified.  [facility] Embedded Care Gaps. Reference number: [id number]. 5/08/2022   12:16:09 AM CDT

## 2022-05-08 NOTE — TELEPHONE ENCOUNTER
Refill Routing Note   Medication(s) are not appropriate for processing by [facility] for the following reason(s):      - Patient has been seen in the ED/Hospital since the last PCP visit    [facility] action(s):  Defer          Medication reconciliation completed: No     Appointments  past 12m or future 3m with PCP    Date Provider   Last Visit   3/18/2022 Cristobal Ann MD   Next Visit   6/21/2022 Cristobal Ann MD   ED visits in past 90 days: 0        Note composed:4:23 AM 05/08/2022

## 2022-05-10 ENCOUNTER — HOSPITAL ENCOUNTER (EMERGENCY)
Facility: HOSPITAL | Age: 57
Discharge: HOME | End: 2022-05-10
Attending: STUDENT IN AN ORGANIZED HEALTH CARE EDUCATION/TRAINING PROGRAM
Payer: MEDICAID

## 2022-05-10 VITALS
BODY MASS INDEX: 32.93 KG/M2 | WEIGHT: 204 LBS | OXYGEN SATURATION: 96 % | TEMPERATURE: 99 F | SYSTOLIC BLOOD PRESSURE: 168 MMHG | RESPIRATION RATE: 18 BRPM | DIASTOLIC BLOOD PRESSURE: 99 MMHG | HEART RATE: 88 BPM

## 2022-05-10 DIAGNOSIS — M25.551 RIGHT HIP PAIN: ICD-10-CM

## 2022-05-10 DIAGNOSIS — R07.89 RIGHT-SIDED CHEST WALL PAIN: ICD-10-CM
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
## 2022-05-10 PROCEDURE — 99284 EMERGENCY DEPT VISIT MOD MDM: CPT | Mod: 25

## 2022-05-10 PROCEDURE — 25000003 PHARM REV CODE 250: Performed by: STUDENT IN AN ORGANIZED HEALTH CARE EDUCATION/TRAINING PROGRAM

## 2022-05-10 RX ORDER — TIOTROPIUM BROMIDE 18 UG/1
CAPSULE ORAL; RESPIRATORY (INHALATION)
Qty: 30 CAPSULE | Refills: 2 | Status: SHIPPED | OUTPATIENT
Start: 2022-05-10 | End: 2022-07-15

## 2022-05-10 RX ORDER — IBUPROFEN 600 MG/1
600 TABLET ORAL
Status: DISCONTINUED | OUTPATIENT
Start: 2022-05-10 | End: 2022-05-11 | Stop reason: HOSPADM

## 2022-05-10 RX ORDER — ACETAMINOPHEN 325 MG/1
650 TABLET ORAL
Status: COMPLETED | OUTPATIENT
Start: 2022-05-10 | End: 2022-05-10

## 2022-05-10 RX ADMIN — ACETAMINOPHEN 650 MG: 325 TABLET ORAL at 09:05

## 2022-05-11 NOTE — ED PROVIDER NOTES
"Encounter Date: 5/10/2022       History     Chief Complaint   Patient presents with    Fall    multiple complaints     Pt states she fell from the second step from the ground about 1 hour ago.  Pt states she slipped while walking down the steps, and loss her balance.  Pt states she landed on her rightside.  Denies hitting her head or LOC.  Pt unknown how long she was on the ground for.  Pt c/o head, neck, back, right arm, and right leg pain.  Pt ambulated into hospital without assistance and drove herself to the ED.     56-year-old female with history of COPD, DVT, atrial fibrillation on Eliquis, presenting after a mechanical fall.  Patient reports that she missed the last step as she was leaving her home.  States she landed on her right side.  Patient ambulated without assistance after the fall, but is reporting right hip pain and right chest pain.  Patient reports that she hit her head on the way down, no loss of consciousness, no vision changes, no neck pain or back pain.  No left-sided pain.  Patient is reporting mild headache.  Patient denies numbness or weakness.        Review of patient's allergies indicates:   Allergen Reactions    Penicillins Hives    Iodinated contrast media Nausea And Vomiting    Oxycodone-acetaminophen Other (See Comments)     Nausea, Dizziness, Anxiety.  "I don't like how it makes me feel."   Given Hydromorphone 0.5mg IVP  Without problems.  Other reaction(s): Other (See Comments)    Diovan hct [valsartan-hydrochlorothiazide] Other (See Comments)     Causes coughing    Irinotecan      Pt has homozygosity for the TA7 promoter variant that places this individual at significantly increased risk for   severe neutropenia (grade 4) when treated with the standard dose of irinotecan (risk approximately 50%).   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 Metabolism of " other drugs not listed here may also be impacted by UGT1A1 enzyme activity.       Tramadol Nausea And Vomiting     Other reaction(s): Other (See Comments)     Past Medical History:   Diagnosis Date    Anemia     Anticoagulant long-term use     Arthritis     Atrial fibrillation     CKD (chronic kidney disease), stage IV 5/8/2018    Congestive heart failure     COPD (chronic obstructive pulmonary disease)     Deep vein thrombosis     elevated bilirubin d/t Gilbert's syndrome     confirmed by [facility] genetic testing, evaluated by hepatology    Encounter for blood transfusion     GERD (gastroesophageal reflux disease)     Hypertension     Hypertensive cardiovascular-renal disease, stage 1-4 or unspecified chronic kidney disease, with heart failure 3/4/2022    Pheochromocytoma, malignant     Restrictive lung disease 4/26/2022    Right kidney mass     Sleep apnea     Thalassemia trait, alpha     Thyroid disease     Type 2 diabetes mellitus with hyperglycemia, without long-term current use of insulin 8/13/2020     Past Surgical History:   Procedure Laterality Date    APPENDECTOMY      BONE MARROW BIOPSY      CARDIAC DEFIBRILLATOR PLACEMENT Left 12/2016    CARDIAC ELECTROPHYSIOLOGY MAPPING AND ABLATION      CARDIAC ELECTROPHYSIOLOGY MAPPING AND ABLATION      COLONOSCOPY N/A 5/6/2022    Procedure: COLONOSCOPY;  Surgeon: rAely Betancourt MD;  Location: [facility] ([address] [place] [address]);  Service: Endoscopy;  Laterality: N/A;  heart transplant candidate/ EF 25% - 2nd floor/ defib - Biotronik - [facility]  Eliquis - per Dr. Cortez with [facility], Pt ok to hold Eliquis x 2 days prior-see media tab-outside correspondence dated 12/30/21  - [facility]  verbal instructions/portal instructions/email instructions - s    EYE SURGERY      due to running tears    FRACTURE SURGERY Left     hand 5th digit    HYSTERECTOMY      KNEE SURGERY Left 2016    hematoma    LIVER BIOPSY  10/24/2018    Minimal steatosis, predominantly  macrovesicular, 1%, Minimal nonspecific portal inflammation, no fibrosis. No findings on biopsy to explain elevated bilirubin levels. Could be d/t Gilbert's =?- hemolysis    RIGHT HEART CATHETERIZATION Right 2021    Procedure: INSERTION, CATHETER, RIGHT HEART;  Surgeon: Irving Cardenas MD;  Location: [facility] CATH LAB;  Service: Cardiology;  Laterality: Right;    TRANSJUGULAR BIOPSY OF LIVER N/A 10/24/2018    Procedure: BIOPSY, LIVER, TRANSJUGULAR APPROACH;  Surgeon: Carmen Diagnostic Provider;  Location: [facility] OR [address] [place] [address];  Service: Radiology;  Laterality: N/A;     Family History   Problem Relation Age of Onset    Cancer Mother         pancreatic CA early 50's    Heart disease Father          MI in late 50's    Hypertension Father     Heart attack Father     Heart disease Sister     Heart disease Brother     Cirrhosis Brother         alcoholic    Heart disease Sister     Heart disease Brother     Hypertension Brother     Diabetes Brother      Social History     Tobacco Use    Smoking status: Never Smoker    Smokeless tobacco: Never Used   Substance Use Topics    Alcohol use: Yes     Comment: up to 1 yr ago drank 2-3 drinks on occasion but sporadic    Drug use: No     Review of Systems   Constitutional: Negative for chills and fever.   HENT: Negative for sore throat.    Eyes: Negative for visual disturbance.   Respiratory: Negative for shortness of breath.    Cardiovascular: Negative for chest pain.   Gastrointestinal: Negative for nausea.   Genitourinary: Negative for dysuria.   Musculoskeletal: Negative for back pain.        Right hip pain and right chest wall pain   Skin: Negative for rash.   Neurological: Positive for headaches. Negative for weakness and numbness.   Hematological: Does not bruise/bleed easily.       Physical Exam     Initial Vitals [05/10/22 2117]   BP Pulse Resp Temp SpO2   (!) 199/109 109 18 98.9 °F (37.2 °C) (!) 92 %      MAP       --         Physical Exam    Nursing note  and vitals reviewed.  Constitutional: She appears well-developed. She is not diaphoretic. No distress.   HENT:   Head: Normocephalic and atraumatic.   Eyes: Pupils are equal, round, and reactive to light.   Neck:   Normal range of motion.  Cardiovascular:   No murmur heard.  Pulmonary/Chest: No respiratory distress.   Mild right-sided chest wall tenderness to to palpation.  No skin changes.   Abdominal: Abdomen is soft.   Musculoskeletal:         General: No edema.      Cervical back: Normal range of motion.      Comments: Moving all extremities. No pain with palpation to bilateral shoulders, elbows, wrists, hips, knees, ankles. No midline tenderness.        Neurological: She is alert and oriented to person, place, and time.   Skin: Skin is warm.   Psychiatric: She has a normal mood and affect.         ED Course   Procedures  Labs Reviewed - No data to display       Imaging Results          X-Ray Ribs 2 View Right (Final result)  Result time 05/10/22 22:36:08    Final result by Jairo Johnson MD (05/10/22 22:36:08)                 Impression:      No evidence of a displaced right-sided rib fracture.  Follow-up, as clinically warranted.      Electronically signed by: Jairo Johnson MD  Date:    05/10/2022  Time:    22:36             Narrative:    EXAMINATION:  XR RIBS 2 VIEW RIGHT    CLINICAL HISTORY:  Other chest pain    TECHNIQUE:  Two views of the right ribs were performed.    COMPARISON:  None    FINDINGS:  The bone mineralization is within normal limits.  There is no cortical step-off.  There is no evidence of a displaced right-sided rib fracture.    There is a left-sided AICD in place.  There are postop changes in the right upper abdominal quadrant.                               X-Ray Hip 2 or 3 views Right (with Pelvis when performed) (Final result)  Result time 05/10/22 22:41:56    Final result by Jairo Johnson MD (05/10/22 22:41:56)                 Impression:      No acute process.      Electronically signed  by: Jairo Johnson MD  Date:    05/10/2022  Time:    22:41             Narrative:    EXAMINATION:  XR HIP WITH PELVIS WHEN PERFORMED, 2 OR 3  VIEWS RIGHT    CLINICAL HISTORY:  Pain in right hip    TECHNIQUE:  AP view of the pelvis and frog leg lateral view of the right hip were performed.    COMPARISON:  CT chest, abdomen, and pelvis dated 12/01/2021.    FINDINGS:  Pelvis:    The bone mineralization is within normal limits.  There is no cortical step-off.  There is no evidence of periostitis.    The joint spaces are maintained.  The soft tissues are unremarkable.    Right hip:    The bone mineralization is within normal limits.  There is no cortical step-off.  There is no evidence of periostitis    The joint spaces are maintained.  The soft tissues are unremarkable.                               CT Head Without Contrast (Final result)  Result time 05/10/22 22:12:25    Final result by Jairo Johnson MD (05/10/22 22:12:25)                 Impression:      No acute intracranial process.      Electronically signed by: Jairo Johnson MD  Date:    05/10/2022  Time:    22:12             Narrative:    EXAMINATION:  CT HEAD WITHOUT CONTRAST    CLINICAL HISTORY:  Head trauma, moderate-severe;    TECHNIQUE:  Low dose axial images were obtained through the head.  Coronal and sagittal reformations were also performed. Contrast was not administered.    COMPARISON:  CT dated 11/02/2021.    FINDINGS:  The subcutaneous tissues are unremarkable.  The bony calvarium is intact.  The paranasal sinuses unremarkable.  The mastoid air cells are clear.  The orbits and intraorbital contents are unremarkable.    The craniocervical junction is intact.  The midline structures are unremarkable.  There are no extra-axial fluid collections.  There is no evidence of intracranial hemorrhage.  The ventricles and sulci are within normal limits.  There is cisterns are unremarkable.  The gray-white differentiation is maintained.  There is no dense vessel  sign.  There is no evidence of mass effect.                                 Medications   ibuprofen tablet 600 mg (600 mg Oral Not Given 5/10/22 2145)   acetaminophen tablet 650 mg (650 mg Oral Given 5/10/22 2145)     Medical Decision Making:   Differential Diagnosis:   DDX:  Mechanical fall.  Patient hit head, taking anticoagulation.  Asymptomatic, but will scan to rule out intracranial hemorrhage.  Also reporting right hip and right chest wall pain.  Will image to rule out fracture.  Low suspicion given benign exam.  DX:  CT head, chest x-ray.  X-ray right hip.  TX:  Analgesia PRN  Dispo:  Discharge pending workup.               ED Course as of 05/11/22 0028   Tue May 10, 2022   2220 CT Head Without Contrast [NB]   2248 Efe Norris 10:49 PM  Symptoms improved. Discussed results, findings, supportive care, follow up recommendations, and return to ED precautions with this patient. Patient verbalized understanding and agreement.  Patient is stable for discharge at this time.   [NB]      ED Course User Index  [NB] Efe Norris MD             Clinical Impression:   Final diagnoses:  [M25.551] Right hip pain  [R07.89] Right-sided chest wall pain  [W19.XXXA] Fall, initial encounter (Primary)          ED Disposition Condition    Discharge Stable        ED Prescriptions     None        Follow-up Information     Follow up With Specialties Details Why Contact Info    Cristobal Ann MD Family Medicine Schedule an appointment as soon as possible for a visit in 2 days  [address] [facility] [address]  [phone]      [facility] - Emergency Dept Emergency Medicine  If symptoms worsen [address] [place] [id number]  [phone]           Efe Norris MD  05/10/22 2136       Efe Norris MD  05/11/22 0028

## 2022-05-11 NOTE — DISCHARGE INSTRUCTIONS
Please follow up with your primary care physician within 2 days. Ensure that you review all lab work results and/or imaging results. If you have any questions about your discharge paperwork please call the Emergency Department.     Return to the ED for any headache, vision changes, dizziness, lightheadedness, nausea, vomiting, speech changes, numbness or tingling in extremitites, or any new or worsening symptoms.    Thank you for visiting [facility], Department of Emergency Medicine. Please see the entirety of the educational materials provided. Please note that a visit to the emergency department does not substitute ongoing care from a primary medical provider or specialist. Please ensure to follow up as recommended. However, please return to the emergency department immediately if symptoms do not improve as discussed, symptoms worsen, new symptoms develop, difficulty in following up or for any of your concerns or issues. Please note on discharge you are acknowledging understanding and agreement on medical evaluation, management recommendations and follow up recommendations.

## 2022-05-12 ENCOUNTER — TELEPHONE (OUTPATIENT)
Dept: FAMILY MEDICINE | Facility: CLINIC | Age: 57
End: 2022-05-12
Payer: MEDICAID

## 2022-05-12 LAB
FINAL PATHOLOGIC DIAGNOSIS: NORMAL
Lab: NORMAL

## 2022-05-12 NOTE — TELEPHONE ENCOUNTER
"----- Message from Miriam Gloria sent at 2022 11:23 AM CDT -----  Contact: self  Hafsa Hawley  MRN: [id number]  : 1965  PCP: Cristobal Ann  Home Phone      [phone]  Work Phone      Not on file.  Mobile          [phone]  Home Phone      [phone]      MESSAGE:   PT states she fell down the stairs coming out of her house and hurt her back. States she is having a hard time moving bc of the pain. States she went to the ER and they told her there was nothing  they could do/ determined no breaks or fractures, and to f/u with PCP. States they told her to take tylenol for the pain. Let her know that Seth was out of clinic and next available with anyone would be . Requesting Seth send a muscle relaxer in for pain when he comes back. I let her know her know he may not approve that without being seen.    Stated he could look in the computer at her ER visit. States shes been taking tylenol "back to back" with no help.      Please advise    [facility]_[place]      [phone]    "

## 2022-05-13 ENCOUNTER — LAB VISIT (OUTPATIENT)
Dept: LAB | Facility: HOSPITAL | Age: 57
End: 2022-05-13
Attending: STUDENT IN AN ORGANIZED HEALTH CARE EDUCATION/TRAINING PROGRAM
Payer: MEDICAID

## 2022-05-13 ENCOUNTER — OFFICE VISIT (OUTPATIENT)
Dept: FAMILY MEDICINE | Facility: CLINIC | Age: 57
End: 2022-05-13
Payer: MEDICAID

## 2022-05-13 VITALS
HEART RATE: 84 BPM | HEIGHT: 66 IN | WEIGHT: 209.13 LBS | RESPIRATION RATE: 20 BRPM | SYSTOLIC BLOOD PRESSURE: 134 MMHG | DIASTOLIC BLOOD PRESSURE: 86 MMHG | BODY MASS INDEX: 33.61 KG/M2

## 2022-05-13 DIAGNOSIS — M1A.9XX0 CHRONIC GOUT WITHOUT TOPHUS, UNSPECIFIED CAUSE, UNSPECIFIED SITE: Primary | ICD-10-CM
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
## 2022-05-13 DIAGNOSIS — M1A.9XX0 CHRONIC GOUT WITHOUT TOPHUS, UNSPECIFIED CAUSE, UNSPECIFIED SITE: ICD-10-CM

## 2022-05-13 LAB — URATE SERPL-MCNC: 12.2 MG/DL (ref 2.4–5.7)

## 2022-05-13 PROCEDURE — 3066F PR DOCUMENTATION OF TREATMENT FOR NEPHROPATHY: ICD-10-PCS | Mod: CPTII,,, | Performed by: STUDENT IN AN ORGANIZED HEALTH CARE EDUCATION/TRAINING PROGRAM

## 2022-05-13 PROCEDURE — 4010F PR ACE/ARB THEARPY RXD/TAKEN: ICD-10-PCS | Mod: CPTII,,, | Performed by: STUDENT IN AN ORGANIZED HEALTH CARE EDUCATION/TRAINING PROGRAM

## 2022-05-13 PROCEDURE — 99215 OFFICE O/P EST HI 40 MIN: CPT | Mod: PBBFAC | Performed by: STUDENT IN AN ORGANIZED HEALTH CARE EDUCATION/TRAINING PROGRAM

## 2022-05-13 PROCEDURE — 3044F PR MOST RECENT HEMOGLOBIN A1C LEVEL <7.0%: ICD-10-PCS | Mod: CPTII,,, | Performed by: STUDENT IN AN ORGANIZED HEALTH CARE EDUCATION/TRAINING PROGRAM

## 2022-05-13 PROCEDURE — 1159F MED LIST DOCD IN RCRD: CPT | Mod: CPTII,,, | Performed by: STUDENT IN AN ORGANIZED HEALTH CARE EDUCATION/TRAINING PROGRAM

## 2022-05-13 PROCEDURE — 3079F DIAST BP 80-89 MM HG: CPT | Mod: CPTII,,, | Performed by: STUDENT IN AN ORGANIZED HEALTH CARE EDUCATION/TRAINING PROGRAM

## 2022-05-13 PROCEDURE — 3044F HG A1C LEVEL LT 7.0%: CPT | Mod: CPTII,,, | Performed by: STUDENT IN AN ORGANIZED HEALTH CARE EDUCATION/TRAINING PROGRAM

## 2022-05-13 PROCEDURE — 4010F ACE/ARB THERAPY RXD/TAKEN: CPT | Mod: CPTII,,, | Performed by: STUDENT IN AN ORGANIZED HEALTH CARE EDUCATION/TRAINING PROGRAM

## 2022-05-13 PROCEDURE — 36415 COLL VENOUS BLD VENIPUNCTURE: CPT | Performed by: STUDENT IN AN ORGANIZED HEALTH CARE EDUCATION/TRAINING PROGRAM

## 2022-05-13 PROCEDURE — 3075F PR MOST RECENT SYSTOLIC BLOOD PRESS GE 130-139MM HG: ICD-10-PCS | Mod: CPTII,,, | Performed by: STUDENT IN AN ORGANIZED HEALTH CARE EDUCATION/TRAINING PROGRAM

## 2022-05-13 PROCEDURE — 3066F NEPHROPATHY DOC TX: CPT | Mod: CPTII,,, | Performed by: STUDENT IN AN ORGANIZED HEALTH CARE EDUCATION/TRAINING PROGRAM

## 2022-05-13 PROCEDURE — 3008F BODY MASS INDEX DOCD: CPT | Mod: CPTII,,, | Performed by: STUDENT IN AN ORGANIZED HEALTH CARE EDUCATION/TRAINING PROGRAM

## 2022-05-13 PROCEDURE — 99213 OFFICE O/P EST LOW 20 MIN: CPT | Mod: S$PBB,,, | Performed by: STUDENT IN AN ORGANIZED HEALTH CARE EDUCATION/TRAINING PROGRAM

## 2022-05-13 PROCEDURE — 99999 PR PBB SHADOW E&M-EST. PATIENT-LVL V: ICD-10-PCS | Mod: PBBFAC,,, | Performed by: STUDENT IN AN ORGANIZED HEALTH CARE EDUCATION/TRAINING PROGRAM

## 2022-05-13 PROCEDURE — 84550 ASSAY OF BLOOD/URIC ACID: CPT | Performed by: STUDENT IN AN ORGANIZED HEALTH CARE EDUCATION/TRAINING PROGRAM

## 2022-05-13 PROCEDURE — 3079F PR MOST RECENT DIASTOLIC BLOOD PRESSURE 80-89 MM HG: ICD-10-PCS | Mod: CPTII,,, | Performed by: STUDENT IN AN ORGANIZED HEALTH CARE EDUCATION/TRAINING PROGRAM

## 2022-05-13 PROCEDURE — 99999 PR PBB SHADOW E&M-EST. PATIENT-LVL V: CPT | Mod: PBBFAC,,, | Performed by: STUDENT IN AN ORGANIZED HEALTH CARE EDUCATION/TRAINING PROGRAM

## 2022-05-13 PROCEDURE — 3008F PR BODY MASS INDEX (BMI) DOCUMENTED: ICD-10-PCS | Mod: CPTII,,, | Performed by: STUDENT IN AN ORGANIZED HEALTH CARE EDUCATION/TRAINING PROGRAM

## 2022-05-13 PROCEDURE — 1159F PR MEDICATION LIST DOCUMENTED IN MEDICAL RECORD: ICD-10-PCS | Mod: CPTII,,, | Performed by: STUDENT IN AN ORGANIZED HEALTH CARE EDUCATION/TRAINING PROGRAM

## 2022-05-13 PROCEDURE — 3075F SYST BP GE 130 - 139MM HG: CPT | Mod: CPTII,,, | Performed by: STUDENT IN AN ORGANIZED HEALTH CARE EDUCATION/TRAINING PROGRAM

## 2022-05-13 PROCEDURE — 99213 PR OFFICE/OUTPT VISIT, EST, LEVL III, 20-29 MIN: ICD-10-PCS | Mod: S$PBB,,, | Performed by: STUDENT IN AN ORGANIZED HEALTH CARE EDUCATION/TRAINING PROGRAM

## 2022-05-13 RX ORDER — HYDROCODONE BITARTRATE AND ACETAMINOPHEN 5; 325 MG/1; MG/1
1 TABLET ORAL EVERY 8 HOURS PRN
Qty: 21 TABLET | Refills: 0 | Status: SHIPPED | OUTPATIENT
Start: 2022-05-13 | End: 2023-04-11 | Stop reason: SDUPTHER

## 2022-05-13 RX ORDER — ALLOPURINOL 100 MG/1
200 TABLET ORAL DAILY
Qty: 60 TABLET | Refills: 2 | Status: SHIPPED | OUTPATIENT
Start: 2022-05-13 | End: 2022-09-14

## 2022-05-13 NOTE — PROGRESS NOTES
Subjective:       Patient ID: Hafsa Hawley is a 56 y.o. female.    Chief Complaint: Follow-up (Pt here for er f/u. States has been experiencing back, side, leg, and neck pain since falling down stairs. States did hit head on grass but didn't loose consciousness but got really dizzy. )    Pt here for ER follow-up. She reports she had a mechanical fall when going up the stairs at her home. She landed on her right side. She reports head injury, but no LOC. She has had some aches and pains since that time of her head, neck, back, and right arm/leg. She is having pain all over since she fell.     She had xray of the right hip in ER that was negative. She also had a right rib xray that was negative. She also had a CT head with no acute process.    Pt states she is afraid of steps. She does not like using them, but she has 5 steps to get in her house.     Pt also states she was diagnosed with gout and is still having significant pain in both feet. Last uric acid level was 10.0 2/2022. She is on allopurinol 100mg daily    Review of Systems   Constitutional: Negative for chills and fever.   HENT: Negative for congestion and sore throat.    Respiratory: Positive for shortness of breath (chronic). Negative for cough.    Cardiovascular: Negative for chest pain.   Gastrointestinal: Positive for abdominal pain (cramping) and nausea.   Genitourinary: Negative for dysuria and hematuria.   Musculoskeletal: Positive for arthralgias, back pain and myalgias.       Objective:      Physical Exam   Constitutional: No distress.   HENT:   Head: Normocephalic and atraumatic.   Eyes: Conjunctivae are normal.   Cardiovascular: Normal rate, regular rhythm and normal heart sounds.   No murmur heard.  Pulmonary/Chest: Effort normal and breath sounds normal. No respiratory distress.   Musculoskeletal:         General: No swelling or deformity.      Right lower leg: Edema (trace) present.      Left lower leg: Edema (trace) present.    Neurological: She is alert.   Vitals reviewed.      Assessment:       1. Chronic gout without tophus, unspecified cause, unspecified site    2. Fall (on) (from) other stairs and steps, subsequent encounter        Plan:       Chronic gout without tophus, unspecified cause, unspecified site  -     Cancel: Uric Acid; Future; Expected date: 05/13/2022  -     Uric Acid; Future; Expected date: 05/13/2022    Fall (on) (from) other stairs and steps, subsequent encounter  -     HYDROcodone-acetaminophen (NORCO) 5-325 mg per tablet; Take 1 tablet by mouth every 8 (eight) hours as needed for Pain.  Dispense: 21 tablet; Refill: 0    7 day supply of norco for pain  Check uric acid level; may need to increase allopurinol  RTC for worsening symptoms or failure to improve, or RTC PRN/as scheduled

## 2022-05-13 NOTE — PROGRESS NOTES
Please inform the pt uric acid level elevated, have her increase allopurinol to 200mg daily, new script sent  MB

## 2022-05-29 ENCOUNTER — NURSE TRIAGE (OUTPATIENT)
Dept: ADMINISTRATIVE | Facility: CLINIC | Age: 57
End: 2022-05-29
Payer: MEDICAID

## 2022-05-29 ENCOUNTER — HOSPITAL ENCOUNTER (EMERGENCY)
Facility: HOSPITAL | Age: 57
Discharge: HOME | End: 2022-05-29
Attending: STUDENT IN AN ORGANIZED HEALTH CARE EDUCATION/TRAINING PROGRAM
Payer: MEDICAID

## 2022-05-29 VITALS
TEMPERATURE: 98 F | SYSTOLIC BLOOD PRESSURE: 126 MMHG | OXYGEN SATURATION: 100 % | BODY MASS INDEX: 33.25 KG/M2 | RESPIRATION RATE: 18 BRPM | WEIGHT: 206 LBS | DIASTOLIC BLOOD PRESSURE: 77 MMHG | HEART RATE: 98 BPM

## 2022-05-29 DIAGNOSIS — M10.9 ACUTE GOUT OF LEFT FOOT, UNSPECIFIED CAUSE: ICD-10-CM

## 2022-05-29 DIAGNOSIS — M79.673 FOOT PAIN: ICD-10-CM

## 2022-05-29 DIAGNOSIS — M79.672 LEFT FOOT PAIN: Primary | ICD-10-CM

## 2022-05-29 PROCEDURE — 25000003 PHARM REV CODE 250

## 2022-05-29 PROCEDURE — 99284 EMERGENCY DEPT VISIT MOD MDM: CPT | Mod: 25

## 2022-05-29 RX ORDER — HYDROCODONE BITARTRATE AND ACETAMINOPHEN 5; 325 MG/1; MG/1
1 TABLET ORAL EVERY 12 HOURS PRN
Qty: 14 TABLET | Refills: 0 | Status: SHIPPED | OUTPATIENT
Start: 2022-05-29 | End: 2022-06-21

## 2022-05-29 RX ORDER — HYDROCODONE BITARTRATE AND ACETAMINOPHEN 10; 325 MG/1; MG/1
1 TABLET ORAL
Status: COMPLETED | OUTPATIENT
Start: 2022-05-29 | End: 2022-05-29

## 2022-05-29 RX ORDER — COLCHICINE 0.6 MG/1
1.2 TABLET, FILM COATED ORAL
Status: COMPLETED | OUTPATIENT
Start: 2022-05-29 | End: 2022-05-29

## 2022-05-29 RX ORDER — COLCHICINE 0.6 MG/1
0.6 TABLET ORAL DAILY
Qty: 7 TABLET | Refills: 0 | Status: SHIPPED | OUTPATIENT
Start: 2022-05-29 | End: 2022-06-05

## 2022-05-29 RX ADMIN — COLCHICINE 1.2 MG: 0.6 TABLET, FILM COATED ORAL at 02:05

## 2022-05-29 RX ADMIN — HYDROCODONE BITARTRATE AND ACETAMINOPHEN 1 TABLET: 10; 325 TABLET ORAL at 01:05

## 2022-05-29 NOTE — ED PROVIDER NOTES
"Encounter Date: 5/29/2022       History     Chief Complaint   Patient presents with    Foot Pain     Patient to ER with swelling and pain to her left foot, recently DX with Gout she states meds are not helping      Hafsa Hawley is a 56 y.o. female and has a past medical history of anemia, Eliquis use, GERD, hypertension, chronic kidney disease, sleep apnea, thalassemia, pheochromocytoma malignant, type 2 diabetes, Gilbert disease, congestive heart failure, atrial fibrillation, gout, and arthritis.  Patient presents to ER today for 10 on a 10 burning pain to left foot.  Patient reports she recently diagnosed with gout and placed on allopurinol which he has been taking.  Patient reports she she got up get her a glass of water to take medicine today and fell and hurt her left foot even more.  Patient reports yesterday she ate some shrimp stew which may have exacerbated her gout symptoms.    This is the extent of patient's complaints for this ER encounter.               Review of patient's allergies indicates:   Allergen Reactions    Penicillins Hives    Iodinated contrast media Nausea And Vomiting    Oxycodone-acetaminophen Other (See Comments)     Nausea, Dizziness, Anxiety.  "I don't like how it makes me feel."   Given Hydromorphone 0.5mg IVP  Without problems.  Other reaction(s): Other (See Comments)    Diovan hct [valsartan-hydrochlorothiazide] Other (See Comments)     Causes coughing    Irinotecan      Pt has homozygosity for the TA7 promoter variant that places this individual at significantly increased risk for   severe neutropenia (grade 4) when treated with the standard dose of irinotecan (risk approximately 50%).   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 Metabolism of other drugs not listed here may also be impacted by UGT1A1 enzyme activity.       Tramadol Nausea And " Vomiting     Other reaction(s): Other (See Comments)     Past Medical History:   Diagnosis Date    Anemia     Anticoagulant long-term use     Arthritis     Atrial fibrillation     CKD (chronic kidney disease), stage IV 5/8/2018    Congestive heart failure     COPD (chronic obstructive pulmonary disease)     Deep vein thrombosis     elevated bilirubin d/t Gilbert's syndrome     confirmed by [facility] genetic testing, evaluated by hepatology    Encounter for blood transfusion     GERD (gastroesophageal reflux disease)     Hypertension     Hypertensive cardiovascular-renal disease, stage 1-4 or unspecified chronic kidney disease, with heart failure 3/4/2022    Pheochromocytoma, malignant     Restrictive lung disease 4/26/2022    Right kidney mass     Sleep apnea     Thalassemia trait, alpha     Thyroid disease     Type 2 diabetes mellitus with hyperglycemia, without long-term current use of insulin 8/13/2020     Past Surgical History:   Procedure Laterality Date    APPENDECTOMY      BONE MARROW BIOPSY      CARDIAC DEFIBRILLATOR PLACEMENT Left 12/2016    CARDIAC ELECTROPHYSIOLOGY MAPPING AND ABLATION      CARDIAC ELECTROPHYSIOLOGY MAPPING AND ABLATION      COLONOSCOPY N/A 5/6/2022    Procedure: COLONOSCOPY;  Surgeon: Arely Betancourt MD;  Location: [facility] ([address] [place] [address]);  Service: Endoscopy;  Laterality: N/A;  heart transplant candidate/ EF 25% - 2nd floor/ defib - Biotronik - ERW  Eliquis - per Dr. Cortez with [facility] [employer], Pt ok to hold Eliquis x 2 days prior-see media tab-outside correspondence dated 12/30/21  - ERW  verbal instructions/portal instructions/email instructions - s    EYE SURGERY      due to running tears    FRACTURE SURGERY Left     hand 5th digit    HYSTERECTOMY      KNEE SURGERY Left 2016    hematoma    LIVER BIOPSY  10/24/2018    Minimal steatosis, predominantly macrovesicular, 1%, Minimal nonspecific portal inflammation, no fibrosis. No findings on biopsy to explain elevated  bilirubin levels. Could be d/t Gilbert's =?- hemolysis    RIGHT HEART CATHETERIZATION Right 2021    Procedure: INSERTION, CATHETER, RIGHT HEART;  Surgeon: Irving Cardenas MD;  Location: [facility] CATH LAB;  Service: Cardiology;  Laterality: Right;    TRANSJUGULAR BIOPSY OF LIVER N/A 10/24/2018    Procedure: BIOPSY, LIVER, TRANSJUGULAR APPROACH;  Surgeon: Carmen Diagnostic Provider;  Location: [facility] OR [address] [place] [address];  Service: Radiology;  Laterality: N/A;     Family History   Problem Relation Age of Onset    Cancer Mother         pancreatic CA early 50's    Heart disease Father          MI in late 50's    Hypertension Father     Heart attack Father     Heart disease Sister     Heart disease Brother     Cirrhosis Brother         alcoholic    Heart disease Sister     Heart disease Brother     Hypertension Brother     Diabetes Brother      Social History     Tobacco Use    Smoking status: Never Smoker    Smokeless tobacco: Never Used   Substance Use Topics    Alcohol use: Yes     Comment: up to 1 yr ago drank 2-3 drinks on occasion but sporadic    Drug use: No     Review of Systems   Constitutional: Negative.    Eyes: Negative.    Respiratory: Negative.    Cardiovascular: Negative.    Gastrointestinal: Negative.    Endocrine: Negative.    Genitourinary: Negative.    Musculoskeletal: Positive for gait problem and joint swelling (Patient reports left foot pain and swelling).   Allergic/Immunologic: Negative.    Neurological: Negative for dizziness.   Hematological: Negative.    Psychiatric/Behavioral: Negative.        Physical Exam     Initial Vitals [22 1247]   BP Pulse Resp Temp SpO2   126/77 105 18 97.6 °F (36.4 °C) 100 %      MAP       --         Physical Exam    Constitutional: She appears well-developed.   HENT:   Head: Normocephalic.   Eyes: Pupils are equal, round, and reactive to light. Right eye exhibits no discharge.   Neck: Neck supple.   Normal range of motion.  Cardiovascular: Normal rate  and intact distal pulses.   Pulmonary/Chest: Breath sounds normal. No respiratory distress. She has no wheezes. She has no rales. She exhibits no tenderness.   Abdominal: Abdomen is soft. She exhibits no distension. There is no abdominal tenderness. There is no rebound.   Musculoskeletal:         General: Tenderness and edema (Swelling and tenderness noted to left foot) present.      Cervical back: Normal range of motion and neck supple.     Neurological: She is alert and oriented to person, place, and time. She displays normal reflexes. A cranial nerve deficit is present. No sensory deficit. GCS score is 15. GCS eye subscore is 4. GCS verbal subscore is 5. GCS motor subscore is 6.   Skin: Capillary refill takes less than 2 seconds.   Psychiatric: She has a normal mood and affect. Her behavior is normal. Judgment and thought content normal.         ED Course   Procedures  Labs Reviewed - No data to display       Imaging Results          X-Ray Foot Complete Left (Final result)  Result time 05/29/22 13:26:08    Final result by Emelia Chou MD (05/29/22 13:26:08)                 Impression:      No acute abnormality.      Electronically signed by: Emelia Chou  Date:    05/29/2022  Time:    13:26             Narrative:    EXAMINATION:  XR FOOT COMPLETE 3 VIEW LEFT    CLINICAL HISTORY:  .  Pain in unspecified foot    TECHNIQUE:  AP, lateral and oblique views of the left foot were performed.    COMPARISON:  04/29/2021    FINDINGS:  There is no fracture, dislocation or radiopaque foreign body.  There is mild hallux valgus.  Slight degenerative changes at the 1st MTP joint and scattered throughout midfoot.  There is a heel spur.  No significant soft tissue swelling.                                 Medications   colchicine capsule/tablet 1.2 mg (has no administration in time range)   HYDROcodone-acetaminophen  mg per tablet 1 tablet (1 tablet Oral Given 5/29/22 3386)     Medical Decision Making:    Differential Diagnosis:   Gout attack, septic joint, foot fracture, foot sprain  Clinical Tests:   Radiological Study: Ordered and Reviewed  ED Management:  Foot pain    Patient has a recent history of gout  She is taking allopurinol at home as prescribed by her primary care physician  Patient reports she ate some shrimp stew yesterday and now her left foot is in excruciating pain  Treated patient with opioid pain medications today  Will give her hydrocodone for another 7 days as she was recently prescribed hydrocodone by her PCP  She reports the hydrocodone does help but does not take the pain away completely  Due to patient's chronic kidney disease and diabetes she is not a good candidate for NSAID and steroid treatment  I have discussed the risks versus benefits of using colchicine with patient.    It is recommended to use colchicine with caution in a patient with renal impairment  I have discussed this extensively with the patient, and she reports she wants to try because she wants the pain to stop  She reports she understands the possible benefits and possible risk of taking colchicine   Patient is follow-up with PCP for re-evaluation further treatment of symptoms    The patient acknowledges that close follow up with medical provider is required. Instructed to follow up with PCP within 2 days. Patient was given specific return precautions. The patient agrees to comply with all instruction and directions given in the ER.       Other:   I discussed test(s) with the performing physician.                      Clinical Impression:   Final diagnoses:  [M79.673] Foot pain  [M79.672] Left foot pain (Primary)  [M10.9] Acute gout of left foot, unspecified cause          ED Disposition Condition    Discharge Stable        ED Prescriptions     Medication Sig Dispense Start Date End Date Auth. Provider    HYDROcodone-acetaminophen (NORCO) 5-325 mg per tablet Take 1 tablet by mouth every 12 (twelve) hours as needed for  Pain. 14 tablet 5/29/2022  Bentley Beard DO    colchicine (COLCRYS) 0.6 mg tablet Take 1 tablet (0.6 mg total) by mouth once daily. for 7 days 7 tablet 5/29/2022 6/5/2022 Adriano Brice NP        Follow-up Information     Follow up With Specialties Details Why Contact Info    Cristobal Ann MD Family Medicine In 2 days  [address] [address]  [phone]             Adriano Brice NP  05/29/22 2291

## 2022-05-30 ENCOUNTER — NURSE TRIAGE (OUTPATIENT)
Dept: ADMINISTRATIVE | Facility: CLINIC | Age: 57
End: 2022-05-30
Payer: MEDICAID

## 2022-05-30 NOTE — TELEPHONE ENCOUNTER
Spoke with patient states she was seen in the ER yesterday related to gout pain.  Patient states pain in her left foot has worsened.  She states pain is radiating from her foot to the hip.  Patient also reports having chest pain, difficulty breathing, weakness and nausea.  Patient feels like she wants to pass out.  Advised patient to call [phone].  Patient verbalized understanding.  Patient is currently home with another adult.     Reason for Disposition   Chest pain   Passed out (i.e., fainted, collapsed and was not responding)    Additional Information   Negative: Looks like a broken bone or dislocated joint (e.g., crooked or deformed)   Negative: Sounds like a life-threatening emergency to the triager   Negative: SEVERE difficulty breathing (e.g., struggling for each breath, speaks in single words)    Protocols used: LEG PAIN-A-OH, CHEST PAIN-A-OH

## 2022-05-31 ENCOUNTER — HOSPITAL ENCOUNTER (EMERGENCY)
Facility: HOSPITAL | Age: 57
Discharge: HOME | End: 2022-05-31
Attending: EMERGENCY MEDICINE
Payer: MEDICAID

## 2022-05-31 VITALS
SYSTOLIC BLOOD PRESSURE: 121 MMHG | WEIGHT: 206 LBS | TEMPERATURE: 98 F | RESPIRATION RATE: 18 BRPM | HEART RATE: 97 BPM | OXYGEN SATURATION: 100 % | DIASTOLIC BLOOD PRESSURE: 77 MMHG | BODY MASS INDEX: 33.25 KG/M2

## 2022-05-31 DIAGNOSIS — M10.9 GOUT, UNSPECIFIED CAUSE, UNSPECIFIED CHRONICITY, UNSPECIFIED SITE: Primary | ICD-10-CM

## 2022-05-31 LAB
ANISOCYTOSIS BLD QL SMEAR: ABNORMAL
BASOPHILS # BLD AUTO: 0.02 K/UL (ref 0–0.2)
BASOPHILS NFR BLD: 0.3 % (ref 0–1.9)
DACRYOCYTES BLD QL SMEAR: ABNORMAL
DIFFERENTIAL METHOD: ABNORMAL
EOSINOPHIL # BLD AUTO: 0.1 K/UL (ref 0–0.5)
EOSINOPHIL NFR BLD: 0.7 % (ref 0–8)
ERYTHROCYTE [DISTWIDTH] IN BLOOD BY AUTOMATED COUNT: 26.9 % (ref 11.5–14.5)
HCT VFR BLD AUTO: 32.5 % (ref 37–48.5)
HGB BLD-MCNC: 9.8 G/DL (ref 12–16)
HYPOCHROMIA BLD QL SMEAR: ABNORMAL
IMM GRANULOCYTES # BLD AUTO: 0.02 K/UL (ref 0–0.04)
IMM GRANULOCYTES NFR BLD AUTO: 0.3 % (ref 0–0.5)
LYMPHOCYTES # BLD AUTO: 0.8 K/UL (ref 1–4.8)
LYMPHOCYTES NFR BLD: 10.7 % (ref 18–48)
MCH RBC QN AUTO: 17.8 PG (ref 27–31)
MCHC RBC AUTO-ENTMCNC: 30.2 G/DL (ref 32–36)
MCV RBC AUTO: 59 FL (ref 82–98)
MONOCYTES # BLD AUTO: 0.6 K/UL (ref 0.3–1)
MONOCYTES NFR BLD: 8.9 % (ref 4–15)
NEUTROPHILS # BLD AUTO: 5.5 K/UL (ref 1.8–7.7)
NEUTROPHILS NFR BLD: 79.1 % (ref 38–73)
NRBC BLD-RTO: 0 /100 WBC
OVALOCYTES BLD QL SMEAR: ABNORMAL
PLATELET # BLD AUTO: 185 K/UL (ref 150–450)
PMV BLD AUTO: ABNORMAL FL (ref 9.2–12.9)
POIKILOCYTOSIS BLD QL SMEAR: ABNORMAL
RBC # BLD AUTO: 5.5 M/UL (ref 4–5.4)
SCHISTOCYTES BLD QL SMEAR: PRESENT
SPHEROCYTES BLD QL SMEAR: ABNORMAL
STOMATOCYTES BLD QL SMEAR: PRESENT
TARGETS BLD QL SMEAR: ABNORMAL
URATE SERPL-MCNC: 10.7 MG/DL (ref 2.4–5.7)
WBC # BLD AUTO: 6.99 K/UL (ref 3.9–12.7)

## 2022-05-31 PROCEDURE — 84550 ASSAY OF BLOOD/URIC ACID: CPT | Performed by: EMERGENCY MEDICINE

## 2022-05-31 PROCEDURE — 63600175 PHARM REV CODE 636 W HCPCS: Performed by: EMERGENCY MEDICINE

## 2022-05-31 PROCEDURE — 25000003 PHARM REV CODE 250: Performed by: EMERGENCY MEDICINE

## 2022-05-31 PROCEDURE — 36415 COLL VENOUS BLD VENIPUNCTURE: CPT | Performed by: EMERGENCY MEDICINE

## 2022-05-31 PROCEDURE — 85025 COMPLETE CBC W/AUTO DIFF WBC: CPT | Performed by: EMERGENCY MEDICINE

## 2022-05-31 PROCEDURE — 96372 THER/PROPH/DIAG INJ SC/IM: CPT | Performed by: EMERGENCY MEDICINE

## 2022-05-31 PROCEDURE — 99284 EMERGENCY DEPT VISIT MOD MDM: CPT | Mod: 25

## 2022-05-31 RX ORDER — PREDNISONE 20 MG/1
40 TABLET ORAL DAILY
Qty: 6 TABLET | Refills: 0 | Status: SHIPPED | OUTPATIENT
Start: 2022-05-31 | End: 2022-06-03

## 2022-05-31 RX ORDER — METHYLPREDNISOLONE SOD SUCC 125 MG
80 VIAL (EA) INJECTION
Status: COMPLETED | OUTPATIENT
Start: 2022-05-31 | End: 2022-05-31

## 2022-05-31 RX ORDER — HYDROCODONE BITARTRATE AND ACETAMINOPHEN 5; 325 MG/1; MG/1
1 TABLET ORAL
Status: COMPLETED | OUTPATIENT
Start: 2022-05-31 | End: 2022-05-31

## 2022-05-31 RX ORDER — COLCHICINE 0.6 MG/1
1.2 TABLET, FILM COATED ORAL
Status: COMPLETED | OUTPATIENT
Start: 2022-05-31 | End: 2022-05-31

## 2022-05-31 RX ADMIN — HYDROCODONE BITARTRATE AND ACETAMINOPHEN 1 TABLET: 5; 325 TABLET ORAL at 01:05

## 2022-05-31 RX ADMIN — COLCHICINE 1.2 MG: 0.6 TABLET, FILM COATED ORAL at 01:05

## 2022-05-31 RX ADMIN — METHYLPREDNISOLONE SODIUM SUCCINATE 80 MG: 125 INJECTION, POWDER, FOR SOLUTION INTRAMUSCULAR; INTRAVENOUS at 12:05

## 2022-05-31 NOTE — ED PROVIDER NOTES
[facility]                                                  Chief Complaint  56 y.o. female with Foot Pain (Patient to ER with left foot pain and swelling, started a week ago was recently DX with gout )    History of Present Illness  Hafsa Hawley presents to the emergency room with acute exacerbation of chronic gout.  Patient was seen a week ago and was treated with colchicine which she states is not working.  Patient has been seen many times for gout in the past.  Blood work 2 weeks ago was consistent with elevated uric acid.  Patient denies trauma.    Past Medical History:   Diagnosis Date    Anemia     Anticoagulant long-term use     Arthritis     Atrial fibrillation     CKD (chronic kidney disease), stage IV 5/8/2018    Congestive heart failure     COPD (chronic obstructive pulmonary disease)     Deep vein thrombosis     elevated bilirubin d/t Gilbert's syndrome     confirmed by [place] genetic testing, evaluated by hepatology    Encounter for blood transfusion     GERD (gastroesophageal reflux disease)     Hypertension     Hypertensive cardiovascular-renal disease, stage 1-4 or unspecified chronic kidney disease, with heart failure 3/4/2022    Pheochromocytoma, malignant     Restrictive lung disease 4/26/2022    Right kidney mass     Sleep apnea     Thalassemia trait, alpha     Thyroid disease     Type 2 diabetes mellitus with hyperglycemia, without long-term current use of insulin 8/13/2020     Past Surgical History:   Procedure Laterality Date    APPENDECTOMY      BONE MARROW BIOPSY      CARDIAC DEFIBRILLATOR PLACEMENT Left 12/2016    CARDIAC ELECTROPHYSIOLOGY MAPPING AND ABLATION      CARDIAC ELECTROPHYSIOLOGY MAPPING AND ABLATION      COLONOSCOPY N/A 5/6/2022    Procedure: COLONOSCOPY;  Surgeon: Arely Betancourt MD;  Location: [facility] ([address] [place] [address]);  Service: Endoscopy;  Laterality: N/A;  heart transplant candidate/ EF 25% - 2nd floor/ defib - Biotronik -  "LATISHAW  Ellen - per Dr. Cortez with [facility], Pt ok to hold Eliquis x 2 days prior-see media tab-outside correspondence dated 12/30/21  - ERW  verbal instructions/portal instructions/email instructions - s    EYE SURGERY      due to running tears    FRACTURE SURGERY Left     hand 5th digit    HYSTERECTOMY      KNEE SURGERY Left 2016    hematoma    LIVER BIOPSY  10/24/2018    Minimal steatosis, predominantly macrovesicular, 1%, Minimal nonspecific portal inflammation, no fibrosis. No findings on biopsy to explain elevated bilirubin levels. Could be d/t Gilbert's =?- hemolysis    RIGHT HEART CATHETERIZATION Right 12/07/2021    Procedure: INSERTION, CATHETER, RIGHT HEART;  Surgeon: Irving Cardenas MD;  Location: [facility] CATH LAB;  Service: Cardiology;  Laterality: Right;    TRANSJUGULAR BIOPSY OF LIVER N/A 10/24/2018    Procedure: BIOPSY, LIVER, TRANSJUGULAR APPROACH;  Surgeon: Carmen Diagnostic Provider;  Location: [facility] OR [address] [place] [address];  Service: Radiology;  Laterality: N/A;      Review of patient's allergies indicates:   Allergen Reactions    Penicillins Hives    Iodinated contrast media Nausea And Vomiting    Oxycodone-acetaminophen Other (See Comments)     Nausea, Dizziness, Anxiety.  "I don't like how it makes me feel."   Given Hydromorphone 0.5mg IVP  Without problems.  Other reaction(s): Other (See Comments)    Diovan hct [valsartan-hydrochlorothiazide] Other (See Comments)     Causes coughing    Irinotecan      Pt has homozygosity for the TA7 promoter variant that places this individual at significantly increased risk for   severe neutropenia (grade 4) when treated with the standard dose of irinotecan (risk approximately 50%).   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 Metabolism of other drugs not listed here may also be impacted by UGT1A1 enzyme activity.       Tramadol Nausea And " Vomiting     Other reaction(s): Other (See Comments)        Review of Systems and Physical Exam     Review of Systems  -- Constitution - no fever, no weight loss, no loss of consciousness  -- Eyes - no changes in vision, no redness, no swelling  -- Ear, Nose - no  earache, denies congestion  -- Mouth,Throat - no sore throat, no toothache, normal voice, normal swallowing  -- Respiratory - denies cough and congestion, no shortness of breath, no wheezing  -- Cardiovascular - denies chest pain, no palpitations,   -- Gastrointestinal - denies abdominal pain, denies nausea, vomiting, and diarrhea  -- Genitourinary - no dysuria, no denies flank pain, no hematuria or frequency   -- Musculoskeletal - denies back pain, negative for myalgias and arthralgias  reports left foot pain  -- Neurological - no headache, no neurologic changes, no loss of bladder or bowel function no seizure like activity, no changes in hearing or vision  -- Skin - denies skin changes, no rash, no hives, no suspected skin infection    Vital Signs   weight is 93.4 kg (206 lb). Her oral temperature is 97.5 °F (36.4 °C). Her blood pressure is 121/77 and her pulse is 97. Her respiration is 18 and oxygen saturation is 100%.      Physical Exam  -- Nursing note and vitals reviewed  -- Constitutional:  Awake alert and oriented, GCS 15, no acute distress.  Appears well.  -- Head: Atraumatic. Normocephalic. No obvious abnormality  -- Eyes: Pupils are equal and reactive to light. Extraocular movements intact. No nystagmus.  No periorbital swelling. Normal conjunctiva.  -- Nose: Nose grossly normal in appearance, nares grossly normal. No rhinorrhea.  -- Throat: Mucous membranes moist, pharynx normal, normal tonsils.  Airway patent.  -- Ears: External ears and TM normal bilaterally. Normal hearing.   -- Neck: Normal range of motion. Neck supple. No meningismus. No adenopathy  -- Cardiac: Normal rate, regular rhythm and normal heart sounds. No carotid bruit. No lower  extremity edema.  -- Pulmonary: Normal respiratory effort, breath sounds equal bilaterally. Adequate flow.  No wheezing.  No crackles.  -- Abdominal: Soft, no tenderness, no guarding, no rebound. Normal bowel sounds.   -- Musculoskeletal: Normal range of motion, all 4 extremities 5/5 strength.  Neurovascularly intact. Atraumatic. No deformities.  Left foot erythematous without evidence of infection or abscess.  Tender to touch.  Warm compared to right foot.  Consistent with gout  -- Neurological:  Cranial nerves 2-12 grossly intact. No focal deficits.   -- Vascular: Posterior tibial, dorsalis pedis and radial pulses 2+ bilaterally    -- Lymphatics: No cervical or peripheral lymphadenopathy.   -- Skin: Warm and dry. No evidence of rash or cellulitis  -- Psychiatric: Normal mood and affect. Bedside behavior is appropriate.  Patient is cooperative.  Denies suicidal homicidal ideation.    Emergency Room Course     Treatment Course, Evaluation, and Medical Decision Making  1. Physical exam as noted above, consistent with gout  2. Solu-Medrol IM  3. Uric acid improving but still elevated at 10.2  4. CBC within normal limits  5. Colchicine 1.2.  Patient has not taken her today  6. Discharge home        Abnormal lab values  Labs Reviewed   URIC ACID   CBC W/ AUTO DIFFERENTIAL       Medications Given  Medications   methylPREDNISolone sodium succinate injection 80 mg (80 mg Intramuscular Given 5/31/22 1215)         Diagnosis  -- gout  Disposition and Plan  -- Disposition: home  -- Condition: stable  -- Follow-up: Patient to follow up with Cristobal Ann MD in 1-2 days, and any specialists noted on discharge paperwork  -- I advised the patient that we have found no life threatening condition today  -- At this time, I believe the patient is clinically stable for discharge.   -- The patient acknowledges that close follow up with a MD is required   -- Patient agrees to comply with all instruction and direction given in the ER  --  Patient counseled on strict return precautions as discussed       Makenna Nelson MD  05/31/22 7613

## 2022-06-07 ENCOUNTER — OFFICE VISIT (OUTPATIENT)
Dept: FAMILY MEDICINE | Facility: CLINIC | Age: 57
End: 2022-06-07
Payer: MEDICAID

## 2022-06-07 VITALS
OXYGEN SATURATION: 97 % | DIASTOLIC BLOOD PRESSURE: 84 MMHG | BODY MASS INDEX: 33.48 KG/M2 | HEART RATE: 84 BPM | RESPIRATION RATE: 18 BRPM | SYSTOLIC BLOOD PRESSURE: 148 MMHG | WEIGHT: 208.31 LBS | HEIGHT: 66 IN

## 2022-06-07 DIAGNOSIS — E11.65 TYPE 2 DIABETES MELLITUS WITH HYPERGLYCEMIA, WITHOUT LONG-TERM CURRENT USE OF INSULIN: ICD-10-CM

## 2022-06-07 DIAGNOSIS — M10.9 GOUT, ARTHRITIS: Primary | ICD-10-CM

## 2022-06-07 PROCEDURE — 4010F PR ACE/ARB THEARPY RXD/TAKEN: ICD-10-PCS | Mod: CPTII,,, | Performed by: FAMILY MEDICINE

## 2022-06-07 PROCEDURE — 99999 PR PBB SHADOW E&M-EST. PATIENT-LVL V: ICD-10-PCS | Mod: PBBFAC,,, | Performed by: FAMILY MEDICINE

## 2022-06-07 PROCEDURE — 3044F HG A1C LEVEL LT 7.0%: CPT | Mod: CPTII,,, | Performed by: FAMILY MEDICINE

## 2022-06-07 PROCEDURE — 1160F PR REVIEW ALL MEDS BY PRESCRIBER/CLIN PHARMACIST DOCUMENTED: ICD-10-PCS | Mod: CPTII,,, | Performed by: FAMILY MEDICINE

## 2022-06-07 PROCEDURE — 1159F PR MEDICATION LIST DOCUMENTED IN MEDICAL RECORD: ICD-10-PCS | Mod: CPTII,,, | Performed by: FAMILY MEDICINE

## 2022-06-07 PROCEDURE — 3044F PR MOST RECENT HEMOGLOBIN A1C LEVEL <7.0%: ICD-10-PCS | Mod: CPTII,,, | Performed by: FAMILY MEDICINE

## 2022-06-07 PROCEDURE — 4010F ACE/ARB THERAPY RXD/TAKEN: CPT | Mod: CPTII,,, | Performed by: FAMILY MEDICINE

## 2022-06-07 PROCEDURE — 3066F NEPHROPATHY DOC TX: CPT | Mod: CPTII,,, | Performed by: FAMILY MEDICINE

## 2022-06-07 PROCEDURE — 3079F PR MOST RECENT DIASTOLIC BLOOD PRESSURE 80-89 MM HG: ICD-10-PCS | Mod: CPTII,,, | Performed by: FAMILY MEDICINE

## 2022-06-07 PROCEDURE — 99213 OFFICE O/P EST LOW 20 MIN: CPT | Mod: S$PBB,,, | Performed by: FAMILY MEDICINE

## 2022-06-07 PROCEDURE — 3008F BODY MASS INDEX DOCD: CPT | Mod: CPTII,,, | Performed by: FAMILY MEDICINE

## 2022-06-07 PROCEDURE — 3077F SYST BP >= 140 MM HG: CPT | Mod: CPTII,,, | Performed by: FAMILY MEDICINE

## 2022-06-07 PROCEDURE — 1160F RVW MEDS BY RX/DR IN RCRD: CPT | Mod: CPTII,,, | Performed by: FAMILY MEDICINE

## 2022-06-07 PROCEDURE — 99213 PR OFFICE/OUTPT VISIT, EST, LEVL III, 20-29 MIN: ICD-10-PCS | Mod: S$PBB,,, | Performed by: FAMILY MEDICINE

## 2022-06-07 PROCEDURE — 3066F PR DOCUMENTATION OF TREATMENT FOR NEPHROPATHY: ICD-10-PCS | Mod: CPTII,,, | Performed by: FAMILY MEDICINE

## 2022-06-07 PROCEDURE — 3008F PR BODY MASS INDEX (BMI) DOCUMENTED: ICD-10-PCS | Mod: CPTII,,, | Performed by: FAMILY MEDICINE

## 2022-06-07 PROCEDURE — 3077F PR MOST RECENT SYSTOLIC BLOOD PRESSURE >= 140 MM HG: ICD-10-PCS | Mod: CPTII,,, | Performed by: FAMILY MEDICINE

## 2022-06-07 PROCEDURE — 99215 OFFICE O/P EST HI 40 MIN: CPT | Mod: PBBFAC | Performed by: FAMILY MEDICINE

## 2022-06-07 PROCEDURE — 99999 PR PBB SHADOW E&M-EST. PATIENT-LVL V: CPT | Mod: PBBFAC,,, | Performed by: FAMILY MEDICINE

## 2022-06-07 PROCEDURE — 3079F DIAST BP 80-89 MM HG: CPT | Mod: CPTII,,, | Performed by: FAMILY MEDICINE

## 2022-06-07 PROCEDURE — 1159F MED LIST DOCD IN RCRD: CPT | Mod: CPTII,,, | Performed by: FAMILY MEDICINE

## 2022-06-07 RX ORDER — ONDANSETRON 8 MG/1
8 TABLET, ORALLY DISINTEGRATING ORAL EVERY 8 HOURS PRN
COMMUNITY
Start: 2022-05-11 | End: 2022-12-04

## 2022-06-07 RX ORDER — POLYETHYLENE GLYCOL-3350 AND ELECTROLYTES 236; 6.74; 5.86; 2.97; 22.74 G/274.31G; G/274.31G; G/274.31G; G/274.31G; G/274.31G
4000 POWDER, FOR SOLUTION ORAL ONCE
Status: ON HOLD | COMMUNITY
Start: 2022-04-26 | End: 2022-10-29

## 2022-06-07 RX ORDER — DULOXETIN HYDROCHLORIDE 30 MG/1
30 CAPSULE, DELAYED RELEASE ORAL DAILY
COMMUNITY
Start: 2022-06-07 | End: 2022-06-21

## 2022-06-07 RX ORDER — PROMETHAZINE HYDROCHLORIDE AND CODEINE PHOSPHATE 6.25; 1 MG/5ML; MG/5ML
5 SOLUTION ORAL
COMMUNITY
Start: 2022-05-10 | End: 2022-06-21

## 2022-06-07 RX ORDER — PREDNISONE 20 MG/1
20 TABLET ORAL 2 TIMES DAILY
COMMUNITY
Start: 2022-06-06 | End: 2022-06-21

## 2022-06-07 RX ORDER — COLCHICINE 0.6 MG/1
0.6 TABLET ORAL 2 TIMES DAILY
Qty: 60 TABLET | Refills: 0 | Status: SHIPPED | OUTPATIENT
Start: 2022-06-07 | End: 2022-07-26

## 2022-06-07 RX ORDER — SCOLOPAMINE TRANSDERMAL SYSTEM 1 MG/1
PATCH, EXTENDED RELEASE TRANSDERMAL
COMMUNITY
Start: 2022-05-11 | End: 2022-11-10 | Stop reason: SDUPTHER

## 2022-06-07 NOTE — PROGRESS NOTES
Subjective:       Patient ID: Hafsa Hawley is a 56 y.o. female.    Chief Complaint: Follow-up (Hospital follow up) and Vaginal Itching (Itching/burning x5 days)    Pt is a 56 y.o. female who presents for evaluation and management of   Encounter Diagnoses   Name Primary?    Gout, arthritis Yes    Type 2 diabetes mellitus with hyperglycemia, without long-term current use of insulin    .  Doing well on current meds. Denies any side effects. Prevention is up to date.  Review of Systems   Musculoskeletal: Positive for arthralgias and joint swelling.       Objective:      Physical Exam  Constitutional:       Appearance: She is well-developed.   HENT:      Head: Normocephalic and atraumatic.      Right Ear: External ear normal.      Left Ear: External ear normal.      Nose: Nose normal.   Eyes:      Pupils: Pupils are equal, round, and reactive to light.   Neck:      Thyroid: No thyromegaly.      Vascular: No JVD.      Trachea: No tracheal deviation.   Cardiovascular:      Rate and Rhythm: Normal rate.      Heart sounds: Normal heart sounds. No murmur heard.  Pulmonary:      Effort: Pulmonary effort is normal. No respiratory distress.      Breath sounds: Normal breath sounds. No wheezing or rales.   Chest:      Chest wall: No tenderness.   Abdominal:      General: Bowel sounds are normal. There is no distension.      Palpations: Abdomen is soft. There is no mass.      Tenderness: There is no abdominal tenderness. There is no guarding or rebound.   Musculoskeletal:         General: Tenderness present.      Cervical back: Normal range of motion and neck supple.      Comments: Left great MTP TTP    Lymphadenopathy:      Cervical: No cervical adenopathy.   Skin:     General: Skin is warm and dry.      Coloration: Skin is not pale.      Findings: No erythema or rash.   Neurological:      Mental Status: She is alert and oriented to person, place, and time.      Cranial Nerves: No cranial nerve deficit.      Motor: No  abnormal muscle tone.      Coordination: Coordination normal.      Deep Tendon Reflexes: Reflexes are normal and symmetric. Reflexes normal.   Psychiatric:         Behavior: Behavior normal.         Thought Content: Thought content normal.         Judgment: Judgment normal.         Assessment:       1. Gout, arthritis    2. Type 2 diabetes mellitus with hyperglycemia, without long-term current use of insulin        Plan:   Hafsa was seen today for follow-up and vaginal itching.    Diagnoses and all orders for this visit:    Gout, arthritis    Type 2 diabetes mellitus with hyperglycemia, without long-term current use of insulin    Other orders  -     colchicine (COLCRYS) 0.6 mg tablet; Take 1 tablet (0.6 mg total) by mouth 2 (two) times daily.      Problem List Items Addressed This Visit     Type 2 diabetes mellitus with hyperglycemia, without long-term current use of insulin    Overview     Lab Results   Component Value Date    HGBA1C 5.7 (H) 02/15/2022                  Other Visit Diagnoses     Gout, arthritis    -  Primary      Based on her CKD she is on the max dose of allopurinol possible.   PRobenicid is not a choice based on her CKD and uloric is not covered by her insurance   She can only take colchicine, allopurinol, and steroids   She has not filled her prednisone Rx'd by the ED yesterday---she needs to take this. As we have no other options.  Agrees to get filled.       No follow-ups on file.

## 2022-06-13 ENCOUNTER — TELEPHONE (OUTPATIENT)
Dept: NEPHROLOGY | Facility: CLINIC | Age: 57
End: 2022-06-13
Payer: MEDICAID

## 2022-06-13 DIAGNOSIS — N18.4 CKD (CHRONIC KIDNEY DISEASE) STAGE 4, GFR 15-29 ML/MIN: Primary | ICD-10-CM

## 2022-06-15 RX ORDER — GLIMEPIRIDE 2 MG/1
TABLET ORAL
Qty: 30 TABLET | Refills: 2 | Status: SHIPPED | OUTPATIENT
Start: 2022-06-15 | End: 2022-08-17

## 2022-06-15 NOTE — TELEPHONE ENCOUNTER
Care Due:                  Date            Visit Type   Department     Provider  --------------------------------------------------------------------------------                                EP -                              [facility]  Last Visit: 06-      CARE ([facility])   MEDICINE       Colt Evans                               -                              [facility]  Next Visit: 06-      CARE ([facility])   MEDICINE       Cristobal nAn                                                            Last  Test          Frequency    Reason                     Performed    Due Date  --------------------------------------------------------------------------------    HBA1C.......  6 months...  glimepiride..............  02-   08-    [facility] Embedded Care Gaps. Reference number: [id number]. 6/15/2022   12:13:43 AM CDT

## 2022-06-15 NOTE — TELEPHONE ENCOUNTER
Refill Routing Note   Medication(s) are not appropriate for processing by [facility] for the following reason(s):      - Required laboratory values are outdated  - Patient has been seen in the ED/Hospital since the last PCP visit    [facility] action(s):  Route Medication-related problems identified: Requires labs        Medication reconciliation completed: No     Appointments  past 12m or future 3m with PCP    Date Provider   Last Visit   5/13/2022 Cristobal Ann MD   Next Visit   6/21/2022 Cristobal Ann MD   ED visits in past 90 days: 3        Note composed:7:30 AM 06/15/2022

## 2022-06-21 ENCOUNTER — OFFICE VISIT (OUTPATIENT)
Dept: FAMILY MEDICINE | Facility: CLINIC | Age: 57
End: 2022-06-21
Payer: MEDICAID

## 2022-06-21 VITALS
SYSTOLIC BLOOD PRESSURE: 132 MMHG | HEART RATE: 92 BPM | RESPIRATION RATE: 18 BRPM | HEIGHT: 66 IN | WEIGHT: 206.88 LBS | DIASTOLIC BLOOD PRESSURE: 76 MMHG | BODY MASS INDEX: 33.25 KG/M2

## 2022-06-21 DIAGNOSIS — M10.9 GOUT, ARTHRITIS: Primary | ICD-10-CM

## 2022-06-21 DIAGNOSIS — M1A.9XX0 CHRONIC GOUT WITHOUT TOPHUS, UNSPECIFIED CAUSE, UNSPECIFIED SITE: ICD-10-CM

## 2022-06-21 PROCEDURE — 3075F SYST BP GE 130 - 139MM HG: CPT | Mod: CPTII,,, | Performed by: STUDENT IN AN ORGANIZED HEALTH CARE EDUCATION/TRAINING PROGRAM

## 2022-06-21 PROCEDURE — 3078F PR MOST RECENT DIASTOLIC BLOOD PRESSURE < 80 MM HG: ICD-10-PCS | Mod: CPTII,,, | Performed by: STUDENT IN AN ORGANIZED HEALTH CARE EDUCATION/TRAINING PROGRAM

## 2022-06-21 PROCEDURE — 3008F PR BODY MASS INDEX (BMI) DOCUMENTED: ICD-10-PCS | Mod: CPTII,,, | Performed by: STUDENT IN AN ORGANIZED HEALTH CARE EDUCATION/TRAINING PROGRAM

## 2022-06-21 PROCEDURE — 99213 PR OFFICE/OUTPT VISIT, EST, LEVL III, 20-29 MIN: ICD-10-PCS | Mod: S$PBB,,, | Performed by: STUDENT IN AN ORGANIZED HEALTH CARE EDUCATION/TRAINING PROGRAM

## 2022-06-21 PROCEDURE — 1159F MED LIST DOCD IN RCRD: CPT | Mod: CPTII,,, | Performed by: STUDENT IN AN ORGANIZED HEALTH CARE EDUCATION/TRAINING PROGRAM

## 2022-06-21 PROCEDURE — 3066F PR DOCUMENTATION OF TREATMENT FOR NEPHROPATHY: ICD-10-PCS | Mod: CPTII,,, | Performed by: STUDENT IN AN ORGANIZED HEALTH CARE EDUCATION/TRAINING PROGRAM

## 2022-06-21 PROCEDURE — 3044F PR MOST RECENT HEMOGLOBIN A1C LEVEL <7.0%: ICD-10-PCS | Mod: CPTII,,, | Performed by: STUDENT IN AN ORGANIZED HEALTH CARE EDUCATION/TRAINING PROGRAM

## 2022-06-21 PROCEDURE — 99215 OFFICE O/P EST HI 40 MIN: CPT | Mod: PBBFAC | Performed by: STUDENT IN AN ORGANIZED HEALTH CARE EDUCATION/TRAINING PROGRAM

## 2022-06-21 PROCEDURE — 4010F ACE/ARB THERAPY RXD/TAKEN: CPT | Mod: CPTII,,, | Performed by: STUDENT IN AN ORGANIZED HEALTH CARE EDUCATION/TRAINING PROGRAM

## 2022-06-21 PROCEDURE — 3008F BODY MASS INDEX DOCD: CPT | Mod: CPTII,,, | Performed by: STUDENT IN AN ORGANIZED HEALTH CARE EDUCATION/TRAINING PROGRAM

## 2022-06-21 PROCEDURE — 3078F DIAST BP <80 MM HG: CPT | Mod: CPTII,,, | Performed by: STUDENT IN AN ORGANIZED HEALTH CARE EDUCATION/TRAINING PROGRAM

## 2022-06-21 PROCEDURE — 3066F NEPHROPATHY DOC TX: CPT | Mod: CPTII,,, | Performed by: STUDENT IN AN ORGANIZED HEALTH CARE EDUCATION/TRAINING PROGRAM

## 2022-06-21 PROCEDURE — 3075F PR MOST RECENT SYSTOLIC BLOOD PRESS GE 130-139MM HG: ICD-10-PCS | Mod: CPTII,,, | Performed by: STUDENT IN AN ORGANIZED HEALTH CARE EDUCATION/TRAINING PROGRAM

## 2022-06-21 PROCEDURE — 3044F HG A1C LEVEL LT 7.0%: CPT | Mod: CPTII,,, | Performed by: STUDENT IN AN ORGANIZED HEALTH CARE EDUCATION/TRAINING PROGRAM

## 2022-06-21 PROCEDURE — 99213 OFFICE O/P EST LOW 20 MIN: CPT | Mod: S$PBB,,, | Performed by: STUDENT IN AN ORGANIZED HEALTH CARE EDUCATION/TRAINING PROGRAM

## 2022-06-21 PROCEDURE — 1159F PR MEDICATION LIST DOCUMENTED IN MEDICAL RECORD: ICD-10-PCS | Mod: CPTII,,, | Performed by: STUDENT IN AN ORGANIZED HEALTH CARE EDUCATION/TRAINING PROGRAM

## 2022-06-21 PROCEDURE — 4010F PR ACE/ARB THEARPY RXD/TAKEN: ICD-10-PCS | Mod: CPTII,,, | Performed by: STUDENT IN AN ORGANIZED HEALTH CARE EDUCATION/TRAINING PROGRAM

## 2022-06-21 PROCEDURE — 99999 PR PBB SHADOW E&M-EST. PATIENT-LVL V: CPT | Mod: PBBFAC,,, | Performed by: STUDENT IN AN ORGANIZED HEALTH CARE EDUCATION/TRAINING PROGRAM

## 2022-06-21 PROCEDURE — 99999 PR PBB SHADOW E&M-EST. PATIENT-LVL V: ICD-10-PCS | Mod: PBBFAC,,, | Performed by: STUDENT IN AN ORGANIZED HEALTH CARE EDUCATION/TRAINING PROGRAM

## 2022-06-21 RX ORDER — FEBUXOSTAT 40 MG/1
40 TABLET, FILM COATED ORAL DAILY
Qty: 30 TABLET | Refills: 1 | Status: SHIPPED | OUTPATIENT
Start: 2022-06-21 | End: 2022-09-14

## 2022-06-21 RX ORDER — HYDROCODONE BITARTRATE AND ACETAMINOPHEN 5; 325 MG/1; MG/1
1 TABLET ORAL EVERY 12 HOURS PRN
Qty: 14 TABLET | Refills: 0 | Status: SHIPPED | OUTPATIENT
Start: 2022-06-21 | End: 2022-06-21

## 2022-06-21 RX ORDER — HYDROCODONE BITARTRATE AND ACETAMINOPHEN 5; 325 MG/1; MG/1
1 TABLET ORAL EVERY 12 HOURS PRN
Qty: 14 TABLET | Refills: 0 | Status: SHIPPED | OUTPATIENT
Start: 2022-06-21 | End: 2022-07-07

## 2022-06-21 NOTE — PROGRESS NOTES
Subjective:       Patient ID: Hafsa Hawley is a 56 y.o. female.    Chief Complaint: Follow-up (3 month f/u. Pt states that she is still having left foot pain. Pt states that they pain comes and goes. Pt states that it feels like a sharp pain. )    Pt here for follow-up.    Left foot pain/Gout: pt was seen by fellow provider and ER recently for gout flare. She is still having pain, swelling, erythema, and warmth of the left foot. She is on max dose allopurinol consider her CKD. uloric is not covered by her insurance.     Review of Systems   Constitutional: Negative for chills and fever.   HENT: Negative for congestion and sore throat.    Respiratory: Negative for cough and shortness of breath.    Cardiovascular: Negative for chest pain.   Genitourinary: Negative for dysuria and hematuria.   Musculoskeletal: Positive for arthralgias and joint swelling.   Neurological: Negative for dizziness, syncope and light-headedness.       Objective:      Physical Exam   Constitutional: No distress.   HENT:   Head: Normocephalic and atraumatic.   Eyes: Conjunctivae are normal.   Cardiovascular: Normal rate, regular rhythm and normal heart sounds.   No murmur heard.  Pulmonary/Chest: Effort normal and breath sounds normal. No respiratory distress.   Musculoskeletal:      Right lower leg: Edema (trace) present.      Left lower leg: Edema (trace) present.      Comments: TTP left foot with mild swelling; no warmth or erythema   Neurological: She is alert.   Vitals reviewed.      Assessment:       1. Gout, arthritis    2. Chronic gout without tophus, unspecified cause, unspecified site        Plan:       Gout, arthritis  -     febuxostat (ULORIC) 40 mg Tab; Take 1 tablet (40 mg total) by mouth once daily.  Dispense: 30 tablet; Refill: 1  -     Basic Metabolic Panel; Future; Expected date: 06/28/2022  -     HYDROcodone-acetaminophen (NORCO) 5-325 mg per tablet; Take 1 tablet by mouth every 12 (twelve) hours as needed for Pain.   Dispense: 14 tablet; Refill: 0    Chronic gout without tophus, unspecified cause, unspecified site  -     febuxostat (ULORIC) 40 mg Tab; Take 1 tablet (40 mg total) by mouth once daily.  Dispense: 30 tablet; Refill: 1  -     Basic Metabolic Panel; Future; Expected date: 06/28/2022  -     HYDROcodone-acetaminophen (NORCO) 5-325 mg per tablet; Take 1 tablet by mouth every 12 (twelve) hours as needed for Pain.  Dispense: 14 tablet; Refill: 0    start uloric 40mg daily since she has failed allopurinol   norco for flare, pt has CKD and needs to avoid NSAIDs. Steroids ok.   If uloric is covered, discussed we need to repeat her BMP one week after starting  RTC for worsening symptoms or failure to improve, or RTC PRN/as scheduled

## 2022-07-07 ENCOUNTER — HOSPITAL ENCOUNTER (EMERGENCY)
Facility: HOSPITAL | Age: 57
Discharge: HOME | End: 2022-07-08
Attending: STUDENT IN AN ORGANIZED HEALTH CARE EDUCATION/TRAINING PROGRAM
Payer: MEDICAID

## 2022-07-07 DIAGNOSIS — R07.89 RIGHT-SIDED CHEST WALL PAIN: ICD-10-CM

## 2022-07-07 DIAGNOSIS — M54.6 ACUTE MIDLINE THORACIC BACK PAIN: ICD-10-CM
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
## 2022-07-07 DIAGNOSIS — M25.511 ACUTE PAIN OF RIGHT SHOULDER: Primary | ICD-10-CM

## 2022-07-07 PROCEDURE — 96372 THER/PROPH/DIAG INJ SC/IM: CPT | Performed by: STUDENT IN AN ORGANIZED HEALTH CARE EDUCATION/TRAINING PROGRAM

## 2022-07-07 PROCEDURE — 99285 EMERGENCY DEPT VISIT HI MDM: CPT | Mod: 25

## 2022-07-07 PROCEDURE — 25000003 PHARM REV CODE 250: Performed by: STUDENT IN AN ORGANIZED HEALTH CARE EDUCATION/TRAINING PROGRAM

## 2022-07-07 PROCEDURE — 63600175 PHARM REV CODE 636 W HCPCS: Performed by: STUDENT IN AN ORGANIZED HEALTH CARE EDUCATION/TRAINING PROGRAM

## 2022-07-07 RX ORDER — ONDANSETRON 2 MG/ML
4 INJECTION INTRAMUSCULAR; INTRAVENOUS
Status: COMPLETED | OUTPATIENT
Start: 2022-07-07 | End: 2022-07-07

## 2022-07-07 RX ORDER — HYDROCODONE BITARTRATE AND ACETAMINOPHEN 5; 325 MG/1; MG/1
1 TABLET ORAL EVERY 4 HOURS PRN
Qty: 15 TABLET | Refills: 0 | OUTPATIENT
Start: 2022-07-07 | End: 2022-07-21

## 2022-07-07 RX ORDER — ONDANSETRON 4 MG/1
4 TABLET, ORALLY DISINTEGRATING ORAL
Status: COMPLETED | OUTPATIENT
Start: 2022-07-07 | End: 2022-07-07

## 2022-07-07 RX ORDER — MORPHINE SULFATE 2 MG/ML
6 INJECTION, SOLUTION INTRAMUSCULAR; INTRAVENOUS
Status: COMPLETED | OUTPATIENT
Start: 2022-07-07 | End: 2022-07-07

## 2022-07-07 RX ORDER — MORPHINE SULFATE 4 MG/ML
4 INJECTION, SOLUTION INTRAMUSCULAR; INTRAVENOUS
Status: COMPLETED | OUTPATIENT
Start: 2022-07-07 | End: 2022-07-07

## 2022-07-07 RX ADMIN — ONDANSETRON 4 MG: 4 TABLET, ORALLY DISINTEGRATING ORAL at 09:07

## 2022-07-07 RX ADMIN — MORPHINE SULFATE 4 MG: 4 INJECTION INTRAVENOUS at 11:07

## 2022-07-07 RX ADMIN — MORPHINE SULFATE 6 MG: 2 INJECTION, SOLUTION INTRAMUSCULAR; INTRAVENOUS at 09:07

## 2022-07-07 RX ADMIN — ONDANSETRON 4 MG: 2 INJECTION INTRAMUSCULAR; INTRAVENOUS at 11:07

## 2022-07-08 VITALS
TEMPERATURE: 97 F | WEIGHT: 207 LBS | RESPIRATION RATE: 18 BRPM | BODY MASS INDEX: 33.27 KG/M2 | HEIGHT: 66 IN | DIASTOLIC BLOOD PRESSURE: 72 MMHG | HEART RATE: 82 BPM | SYSTOLIC BLOOD PRESSURE: 123 MMHG | OXYGEN SATURATION: 97 %

## 2022-07-08 NOTE — ED PROVIDER NOTES
"Encounter Date: 7/7/2022    This document was partially completed using speech recognition software and may contain misspellings, grammatical errors, and/or unexpected word substitutions.       History     Chief Complaint   Patient presents with    Fall     Patient to ER with a slip fall about 3 hours ago, states she is having pain to her neck, right shoulder, and right rib area       56 year old female with a PMHx of Afib, eliquis use, CKD, CHF, COPD, DVT, HTN, DM presents to the ED with her  after a fall 3 hours prior to arrival at home. Patient states it was a slip and fall and she fell onto her right side, hitting her head on the wall. No LOC. Presenting with mid back pain, right shoulder pain, right rib pain. Nauseous but no vomiting. Denies hip or leg pain, left arm pain.        Review of patient's allergies indicates:   Allergen Reactions    Penicillins Hives    Iodinated contrast media Nausea And Vomiting    Oxycodone-acetaminophen Other (See Comments)     Nausea, Dizziness, Anxiety.  "I don't like how it makes me feel."   Given Hydromorphone 0.5mg IVP  Without problems.  Other reaction(s): Other (See Comments)    Diovan hct [valsartan-hydrochlorothiazide] Other (See Comments)     Causes coughing    Irinotecan      Pt has homozygosity for the TA7 promoter variant that places this individual at significantly increased risk for   severe neutropenia (grade 4) when treated with the standard dose of irinotecan (risk approximately 50%).   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 Metabolism of other drugs not listed here may also be impacted by UGT1A1 enzyme activity.       Tramadol Nausea And Vomiting     Other reaction(s): Other (See Comments)     Past Medical History:   Diagnosis Date    Anemia     Anticoagulant long-term use     Arthritis     Atrial fibrillation     CKD " (chronic kidney disease), stage IV 5/8/2018    Congestive heart failure     COPD (chronic obstructive pulmonary disease)     Deep vein thrombosis     elevated bilirubin d/t Gilbert's syndrome     confirmed by [place] genetic testing, evaluated by hepatology    Encounter for blood transfusion     GERD (gastroesophageal reflux disease)     Hypertension     Hypertensive cardiovascular-renal disease, stage 1-4 or unspecified chronic kidney disease, with heart failure 3/4/2022    Pheochromocytoma, malignant     Restrictive lung disease 4/26/2022    Right kidney mass     Sleep apnea     Thalassemia trait, alpha     Thyroid disease     Type 2 diabetes mellitus with hyperglycemia, without long-term current use of insulin 8/13/2020     Past Surgical History:   Procedure Laterality Date    APPENDECTOMY      BONE MARROW BIOPSY      CARDIAC DEFIBRILLATOR PLACEMENT Left 12/2016    CARDIAC ELECTROPHYSIOLOGY MAPPING AND ABLATION      CARDIAC ELECTROPHYSIOLOGY MAPPING AND ABLATION      COLONOSCOPY N/A 5/6/2022    Procedure: COLONOSCOPY;  Surgeon: Arely Betancourt MD;  Location: [facility] ([address] [place] [address]);  Service: Endoscopy;  Laterality: N/A;  heart transplant candidate/ EF 25% - 2nd floor/ defib - [facility]ron[facility] - [facility]  Eliquis - per Dr. Cortez with [facility], Pt ok to hold Eliquis x 2 days prior-see media tab-outside correspondence dated 12/30/21  - [facility]  verbal instructions/portal instructions/email instructions - s    EYE SURGERY      due to running tears    FRACTURE SURGERY Left     hand 5th digit    HYSTERECTOMY      KNEE SURGERY Left 2016    hematoma    LIVER BIOPSY  10/24/2018    Minimal steatosis, predominantly macrovesicular, 1%, Minimal nonspecific portal inflammation, no fibrosis. No findings on biopsy to explain elevated bilirubin levels. Could be d/t Gilbert's =?- hemolysis    RIGHT HEART CATHETERIZATION Right 12/07/2021    Procedure: INSERTION, CATHETER, RIGHT HEART;  Surgeon: Irving Cardenas MD;   Location: [facility] CATH LAB;  Service: Cardiology;  Laterality: Right;    TRANSJUGULAR BIOPSY OF LIVER N/A 10/24/2018    Procedure: BIOPSY, LIVER, TRANSJUGULAR APPROACH;  Surgeon: Carmen Diagnostic Provider;  Location: [facility] OR [address] [place] [address];  Service: Radiology;  Laterality: N/A;     Family History   Problem Relation Age of Onset    Cancer Mother         pancreatic CA early 50's    Heart disease Father          MI in late 50's    Hypertension Father     Heart attack Father     Heart disease Sister     Heart disease Brother     Cirrhosis Brother         alcoholic    Heart disease Sister     Heart disease Brother     Hypertension Brother     Diabetes Brother      Social History     Tobacco Use    Smoking status: Never Smoker    Smokeless tobacco: Never Used   Substance Use Topics    Alcohol use: Yes     Comment: up to 1 yr ago drank 2-3 drinks on occasion but sporadic    Drug use: No     Review of Systems   Constitutional: Negative for chills and fever.   HENT: Negative for congestion, rhinorrhea and sneezing.    Eyes: Negative for discharge and redness.   Respiratory: Negative for cough and shortness of breath.    Cardiovascular: Positive for chest pain. Negative for palpitations.   Gastrointestinal: Negative for abdominal pain, diarrhea, nausea and vomiting.   Genitourinary: Negative for dysuria, frequency, vaginal bleeding and vaginal discharge.   Musculoskeletal: Positive for arthralgias and back pain. Negative for neck pain.   Skin: Negative for rash and wound.   Neurological: Negative for weakness, numbness and headaches.       Physical Exam     Initial Vitals [22 2111]   BP Pulse Resp Temp SpO2   (!) 141/91 102 18 97.1 °F (36.2 °C) 100 %      MAP       --         Physical Exam    Nursing note and vitals reviewed.  Constitutional: She appears well-developed. She is not diaphoretic. No distress.   HENT:   Head: Normocephalic and atraumatic.   Right Ear: External ear normal.   Left Ear: External ear  normal.   Eyes: Right eye exhibits no discharge. Left eye exhibits no discharge. No scleral icterus.   Neck: Neck supple.   Cardiovascular: Normal rate and regular rhythm.   Pulmonary/Chest: Breath sounds normal. No accessory muscle usage or stridor. Tachypnea (due to chest wall pain with breathing) noted. No respiratory distress. She has no wheezes. She has no rhonchi. She has no rales. She exhibits tenderness (right mid to lower chest wall pain on palpation).   Abdominal: Abdomen is soft. There is no abdominal tenderness. There is no guarding.   Musculoskeletal:         General: No edema.      Right shoulder: Tenderness and bony tenderness present. Normal range of motion.      Right upper arm: No tenderness or bony tenderness.      Right elbow: Normal range of motion. No tenderness.      Right forearm: No tenderness or bony tenderness.      Right wrist: No bony tenderness or snuff box tenderness. Normal range of motion.      Right hand: No tenderness or bony tenderness. Normal range of motion.      Cervical back: Neck supple. No tenderness or bony tenderness. Normal range of motion.      Thoracic back: Tenderness and bony tenderness present.      Lumbar back: No tenderness or bony tenderness.      Comments: Able to range right shoulder     Neurological: She is alert and oriented to person, place, and time.   Skin: Skin is warm and dry. Capillary refill takes less than 2 seconds.   Psychiatric: She has a normal mood and affect.         ED Course   Procedures  Labs Reviewed - No data to display       Imaging Results          X-Ray Shoulder Complete 2 View Right (Final result)  Result time 07/07/22 23:13:00    Final result by Jairo Johnson MD (07/07/22 23:13:00)                 Impression:      No evidence of fracture dislocation limited two images provided.  A scapular Y-view may be obtained, if there is high clinical concern for a dislocation.      Electronically signed by: Jairo Johnson  MD  Date:    07/07/2022  Time:    23:13             Narrative:    EXAMINATION:  XR SHOULDER COMPLETE 2 OR MORE VIEWS RIGHT    CLINICAL HISTORY:  Unspecified fall, initial encounter    TECHNIQUE:  Two or three views of the right shoulder were performed.    COMPARISON:  None    FINDINGS:  Limited two views of the right shoulder demonstrates no obvious fracture or dislocation.    The visualized right hemithorax is unremarkable.                               CT Head Without Contrast (Final result)  Result time 07/07/22 23:32:39    Final result by Jairo Johnson MD (07/07/22 23:32:39)                 Impression:      No acute intracranial process.      Electronically signed by: Jairo Johnson MD  Date:    07/07/2022  Time:    23:32             Narrative:    EXAMINATION:  CT HEAD WITHOUT CONTRAST    CLINICAL HISTORY:  on eliquis, fall, minor head injury;    TECHNIQUE:  Low dose axial images were obtained through the head.  Coronal and sagittal reformations were also performed. Contrast was not administered.    COMPARISON:  CT dated 05/10/2022 and 11/02/2021.    FINDINGS:  The subcutaneous tissues are unremarkable.  The bony calvarium is intact.  There is biparietal foramen.  The paranasal sinuses are unremarkable.  There is a small right mastoid effusion.  The orbits and intraorbital contents are within normal limits.    The craniocervical junction is intact.  There is empty appearance of the sella.  There are no extra-axial fluid collections. There is no evidence of intracranial hemorrhage. There is a stable hypodensity in the left cerebellum.  The gray-white differentiation is maintained.  There is no dense vessel sign.  There is no evidence of mass effect.                               X-Ray Thoracic Spine AP Lateral (Final result)  Result time 07/07/22 23:10:37    Final result by Jairo Johnson MD (07/07/22 23:10:37)                 Impression:      No acute process.      Electronically signed by: Jairo Johnson  MD  Date:    07/07/2022  Time:    23:10             Narrative:    EXAMINATION:  XR THORACIC SPINE AP LATERAL    CLINICAL HISTORY:  Unspecified fall, initial encounter    TECHNIQUE:  AP and lateral views of the thoracic spine were performed.    COMPARISON:  None    FINDINGS:  The thoracic alignment is within normal limits.  The vertebral body heights are maintained.  The posterior elements are unremarkable.  The intervertebral disc spaces are unremarkable.  There is no evidence of acute fracture or listhesis of the thoracic spine.    There is a left-sided AICD in place.  There are postop changes in the right upper abdominal quadrant.                               X-Ray Ribs 2 View Right (Final result)  Result time 07/07/22 23:09:11    Final result by Jairo Johnson MD (07/07/22 23:09:11)                 Impression:      No evidence of a displaced right-sided rib fracture.  Short-term follow-up, as clinically warranted.      Electronically signed by: Jairo Johnson MD  Date:    07/07/2022  Time:    23:09             Narrative:    EXAMINATION:  XR RIBS 2 VIEW RIGHT    CLINICAL HISTORY:  Unspecified fall, initial encounter    TECHNIQUE:  Two views of the right ribs were performed.    COMPARISON:  05/10/2022.    FINDINGS:  The bone mineralization is within normal limits.  There is no cortical step-off.  There is no evidence of periostitis.  The joint spaces are maintained.  The soft tissues are unremarkable.    There is no evidence of a displaced right-sided rib fracture.    There is an AICD in place.                                 Medications   morphine injection 6 mg (6 mg Intramuscular Given 7/7/22 2126)   ondansetron disintegrating tablet 4 mg (4 mg Oral Given 7/7/22 2123)   morphine injection 4 mg (4 mg Intramuscular Given 7/7/22 2313)   ondansetron injection 4 mg (4 mg Intramuscular Given 7/7/22 2330)     Medical Decision Making:   Differential Diagnosis:   Ddx: head bleed, tspine fx, lspine fx, cspine fx, rib fx,  PTX, shoulder fx, shoulder dislocation  ED Management:  Based on the patient's evaluation - patient appears well for discharge home. On eliquis with head injury so CT head obtained which was fortunately negative. X-ray of the right ribs, right shoulder, tspine negative for acute fractures. Patient's pain improved in the ED. Will place right arm in sling for comfort and discharge home with norco which she has tolerated in the past. PCP f/u advised. Return precautions given. Patient is in agreement with the plan.                      Clinical Impression:   Final diagnoses:  [W19.XXXA] Fall  [M25.511] Acute pain of right shoulder (Primary)  [M54.6] Acute midline thoracic back pain  [R07.89] Right-sided chest wall pain          ED Disposition Condition    Discharge Stable        ED Prescriptions     Medication Sig Dispense Start Date End Date Auth. Provider    HYDROcodone-acetaminophen (NORCO) 5-325 mg per tablet Take 1 tablet by mouth every 4 (four) hours as needed for Pain. 15 tablet 7/7/2022  Bentley Beard DO        Follow-up Information     Follow up With Specialties Details Why Contact Info    Cristobal Ann MD Family Medicine Schedule an appointment as soon as possible for a visit in 1 week As needed [address] [address]  [phone]             Bentley Beard DO  07/08/22 0031

## 2022-07-08 NOTE — DISCHARGE INSTRUCTIONS
Return to the ED if you have worsening back pain, chest pain, difficulty breathing, shoulder pain, numbness, or weakness.

## 2022-07-11 ENCOUNTER — CLINICAL SUPPORT (OUTPATIENT)
Dept: INFECTIOUS DISEASES | Facility: CLINIC | Age: 57
End: 2022-07-11
Payer: MEDICAID

## 2022-07-11 DIAGNOSIS — Z01.818 PRE-TRANSPLANT EVALUATION FOR HEART TRANSPLANT: ICD-10-CM

## 2022-07-11 DIAGNOSIS — Z76.82 HEART TRANSPLANT CANDIDATE: ICD-10-CM

## 2022-07-11 DIAGNOSIS — I50.42 CHRONIC COMBINED SYSTOLIC AND DIASTOLIC HEART FAILURE: ICD-10-CM

## 2022-07-11 PROCEDURE — 90471 IMMUNIZATION ADMIN: CPT | Mod: PBBFAC

## 2022-07-21 ENCOUNTER — HOSPITAL ENCOUNTER (EMERGENCY)
Facility: HOSPITAL | Age: 57
Discharge: HOME | End: 2022-07-21
Attending: STUDENT IN AN ORGANIZED HEALTH CARE EDUCATION/TRAINING PROGRAM
Payer: MEDICAID

## 2022-07-21 VITALS
DIASTOLIC BLOOD PRESSURE: 92 MMHG | RESPIRATION RATE: 19 BRPM | TEMPERATURE: 98 F | OXYGEN SATURATION: 98 % | WEIGHT: 207 LBS | HEIGHT: 66 IN | SYSTOLIC BLOOD PRESSURE: 180 MMHG | BODY MASS INDEX: 33.27 KG/M2 | HEART RATE: 86 BPM

## 2022-07-21 DIAGNOSIS — M79.18 RIGHT BUTTOCK PAIN: ICD-10-CM

## 2022-07-21 DIAGNOSIS — M79.672 LEFT FOOT PAIN: ICD-10-CM

## 2022-07-21 DIAGNOSIS — M54.6 ACUTE MIDLINE THORACIC BACK PAIN: ICD-10-CM

## 2022-07-21 DIAGNOSIS — G44.319 ACUTE POST-TRAUMATIC HEADACHE, NOT INTRACTABLE: ICD-10-CM

## 2022-07-21 DIAGNOSIS — M54.2 ACUTE NECK PAIN: ICD-10-CM
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
## 2022-07-21 DIAGNOSIS — M54.50 ACUTE MIDLINE LOW BACK PAIN WITHOUT SCIATICA: ICD-10-CM

## 2022-07-21 DIAGNOSIS — M25.551 ACUTE RIGHT HIP PAIN: ICD-10-CM

## 2022-07-21 PROCEDURE — 25000003 PHARM REV CODE 250: Performed by: STUDENT IN AN ORGANIZED HEALTH CARE EDUCATION/TRAINING PROGRAM

## 2022-07-21 PROCEDURE — 99284 EMERGENCY DEPT VISIT MOD MDM: CPT | Mod: 25

## 2022-07-21 PROCEDURE — 96372 THER/PROPH/DIAG INJ SC/IM: CPT | Performed by: STUDENT IN AN ORGANIZED HEALTH CARE EDUCATION/TRAINING PROGRAM

## 2022-07-21 PROCEDURE — 63600175 PHARM REV CODE 636 W HCPCS: Performed by: STUDENT IN AN ORGANIZED HEALTH CARE EDUCATION/TRAINING PROGRAM

## 2022-07-21 RX ORDER — ONDANSETRON 4 MG/1
4 TABLET, ORALLY DISINTEGRATING ORAL
Status: COMPLETED | OUTPATIENT
Start: 2022-07-21 | End: 2022-07-21

## 2022-07-21 RX ORDER — MORPHINE SULFATE 2 MG/ML
6 INJECTION, SOLUTION INTRAMUSCULAR; INTRAVENOUS
Status: DISCONTINUED | OUTPATIENT
Start: 2022-07-21 | End: 2022-07-21

## 2022-07-21 RX ORDER — HYDROCODONE BITARTRATE AND ACETAMINOPHEN 5; 325 MG/1; MG/1
1 TABLET ORAL EVERY 4 HOURS PRN
Qty: 15 TABLET | Refills: 0 | Status: SHIPPED | OUTPATIENT
Start: 2022-07-21 | End: 2022-08-23

## 2022-07-21 RX ORDER — MORPHINE SULFATE 4 MG/ML
4 INJECTION, SOLUTION INTRAMUSCULAR; INTRAVENOUS
Status: COMPLETED | OUTPATIENT
Start: 2022-07-21 | End: 2022-07-21

## 2022-07-21 RX ADMIN — ONDANSETRON 4 MG: 4 TABLET, ORALLY DISINTEGRATING ORAL at 07:07

## 2022-07-21 RX ADMIN — MORPHINE SULFATE 4 MG: 4 INJECTION INTRAVENOUS at 07:07

## 2022-07-21 NOTE — ED PROVIDER NOTES
"Encounter Date: 7/21/2022    This document was partially completed using speech recognition software and may contain misspellings, grammatical errors, and/or unexpected word substitutions.       History     Chief Complaint   Patient presents with    Back Pain     56 year old female with a PMHx anticoagulant use (eliquis), afib, CKD, CHF, COPD, HTN presents to the ED with her  via EMS after tripping and falling down 5 steps, landing on her back and buttock. Reports hitting her head as well, no LOC. She is presenting with headache, neck pain, diffuse back pain, right buttock/hip pain, left foot pain. She reports she's been dealing with gout on the left foot as well. Denies numbness, weakness, bowel/bladder incontinence. Fall occurred around 430pm-5pm today when she tried rushing out the door due to the rain. In a c-collar.        Review of patient's allergies indicates:   Allergen Reactions    Penicillins Hives    Iodinated contrast media Nausea And Vomiting    Oxycodone-acetaminophen Other (See Comments)     Nausea, Dizziness, Anxiety.  "I don't like how it makes me feel."   Given Hydromorphone 0.5mg IVP  Without problems.  Other reaction(s): Other (See Comments)    Diovan hct [valsartan-hydrochlorothiazide] Other (See Comments)     Causes coughing    Irinotecan      Pt has homozygosity for the TA7 promoter variant that places this individual at significantly increased risk for   severe neutropenia (grade 4) when treated with the standard dose of irinotecan (risk approximately 50%).   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 Metabolism of other drugs not listed here may also be impacted by UGT1A1 enzyme activity.       Tramadol Nausea And Vomiting     Other reaction(s): Other (See Comments)     Past Medical History:   Diagnosis Date    Anemia     Anticoagulant long-term use     " Arthritis     Atrial fibrillation     CKD (chronic kidney disease), stage IV 5/8/2018    Congestive heart failure     COPD (chronic obstructive pulmonary disease)     Deep vein thrombosis     elevated bilirubin d/t Gilbert's syndrome     confirmed by [facility] genetic testing, evaluated by hepatology    Encounter for blood transfusion     GERD (gastroesophageal reflux disease)     Hypertension     Hypertensive cardiovascular-renal disease, stage 1-4 or unspecified chronic kidney disease, with heart failure 3/4/2022    Pheochromocytoma, malignant     Restrictive lung disease 4/26/2022    Right kidney mass     Sleep apnea     Thalassemia trait, alpha     Thyroid disease     Type 2 diabetes mellitus with hyperglycemia, without long-term current use of insulin 8/13/2020     Past Surgical History:   Procedure Laterality Date    APPENDECTOMY      BONE MARROW BIOPSY      CARDIAC DEFIBRILLATOR PLACEMENT Left 12/2016    CARDIAC ELECTROPHYSIOLOGY MAPPING AND ABLATION      CARDIAC ELECTROPHYSIOLOGY MAPPING AND ABLATION      COLONOSCOPY N/A 5/6/2022    Procedure: COLONOSCOPY;  Surgeon: Arely Betancourt MD;  Location: [facility] ([address] [place] [address]);  Service: Endoscopy;  Laterality: N/A;  heart transplant candidate/ EF 25% - 2nd floor/ defib - [facility] - [facility]  Eliquis - per Dr. Cortez with [facility], Pt ok to hold Eliquis x 2 days prior-see media tab-outside correspondence dated 12/30/21  - [facility]  verbal instructions/portal instructions/email instructions - s    EYE SURGERY      due to running tears    FRACTURE SURGERY Left     hand 5th digit    HYSTERECTOMY      KNEE SURGERY Left 2016    hematoma    LIVER BIOPSY  10/24/2018    Minimal steatosis, predominantly macrovesicular, 1%, Minimal nonspecific portal inflammation, no fibrosis. No findings on biopsy to explain elevated bilirubin levels. Could be d/t Gilbert's =?- hemolysis    RIGHT HEART CATHETERIZATION Right 12/07/2021    Procedure: INSERTION, CATHETER, RIGHT  HEART;  Surgeon: Irving Cardenas MD;  Location: [facility] CATH LAB;  Service: Cardiology;  Laterality: Right;    TRANSJUGULAR BIOPSY OF LIVER N/A 10/24/2018    Procedure: BIOPSY, LIVER, TRANSJUGULAR APPROACH;  Surgeon: Carmen Diagnostic Provider;  Location: [facility] OR [address] [place] [address];  Service: Radiology;  Laterality: N/A;     Family History   Problem Relation Age of Onset    Cancer Mother         pancreatic CA early 50's    Heart disease Father          MI in late 50's    Hypertension Father     Heart attack Father     Heart disease Sister     Heart disease Brother     Cirrhosis Brother         alcoholic    Heart disease Sister     Heart disease Brother     Hypertension Brother     Diabetes Brother      Social History     Tobacco Use    Smoking status: Never Smoker    Smokeless tobacco: Never Used   Substance Use Topics    Alcohol use: Yes     Comment: up to 1 yr ago drank 2-3 drinks on occasion but sporadic    Drug use: No     Review of Systems   Constitutional: Negative for chills and fever.   HENT: Negative for congestion, rhinorrhea and sneezing.    Eyes: Negative for discharge and redness.   Respiratory: Negative for cough and shortness of breath.    Cardiovascular: Negative for chest pain and palpitations.   Gastrointestinal: Negative for abdominal pain, diarrhea, nausea and vomiting.   Genitourinary: Negative for dysuria, frequency, vaginal bleeding and vaginal discharge.   Musculoskeletal: Positive for arthralgias, back pain, myalgias and neck pain.   Skin: Negative for rash and wound.   Neurological: Positive for headaches. Negative for weakness and numbness.       Physical Exam     Initial Vitals [22 1757]   BP Pulse Resp Temp SpO2   (!) 170/92 88 18 98 °F (36.7 °C) 99 %      MAP       --         Physical Exam    Nursing note and vitals reviewed.  Constitutional: She appears well-developed. She is not diaphoretic. No distress. Cervical collar in place.   HENT:   Head: Normocephalic and atraumatic.    Right Ear: External ear normal.   Left Ear: External ear normal.   Mouth/Throat: Uvula is midline. No trismus in the jaw.   Right jaw pain with no trismus, no deformity   Eyes: Right eye exhibits no discharge. Left eye exhibits no discharge. No scleral icterus.   Neck: Neck supple.   Cardiovascular: Normal rate and regular rhythm.   Pulmonary/Chest: Breath sounds normal. No stridor. No respiratory distress. She has no wheezes. She has no rhonchi. She has no rales.   Abdominal: Abdomen is soft. There is no abdominal tenderness. There is no guarding.   Musculoskeletal:         General: No edema.      Cervical back: Neck supple. Tenderness and bony tenderness present.      Thoracic back: Tenderness and bony tenderness present.      Lumbar back: Tenderness and bony tenderness present.      Right hip: Tenderness and bony tenderness present. Decreased range of motion.      Left hip: No tenderness or bony tenderness. Normal range of motion.      Left ankle: No tenderness. Normal range of motion.      Left foot: Tenderness and bony tenderness present.     Neurological: She is alert and oriented to person, place, and time. No cranial nerve deficit or sensory deficit. GCS eye subscore is 4. GCS verbal subscore is 5. GCS motor subscore is 6.   Skin: Skin is warm and dry. Capillary refill takes less than 2 seconds.   Psychiatric: She has a normal mood and affect.         ED Course   Procedures  Labs Reviewed - No data to display       Imaging Results          X-Ray Foot Complete Left (Final result)  Result time 07/21/22 18:57:32    Final result by Trudi Villa MD (07/21/22 18:57:32)                 Impression:      No acute osseous abnormality.      Electronically signed by: Trudi Villa  Date:    07/21/2022  Time:    18:57             Narrative:    EXAMINATION:  XR FOOT COMPLETE 3 VIEW LEFT    CLINICAL HISTORY:  .  Unspecified fall, initial encounter    TECHNIQUE:  AP, lateral and oblique views of the left foot were  performed.    COMPARISON:  Left foot radiographs 05/29/2022    FINDINGS:  No acute, displaced fracture or aggressive osseous abnormality.  No dislocation.  Mild degenerative change at the 1st tarsometatarsal joint and metatarsophalangeal joint..  No focal soft tissue abnormality.                               CT Thoracic Spine Without Contrast (Final result)  Result time 07/21/22 19:08:23    Final result by Trudi Villa MD (07/21/22 19:08:23)                 Impression:      No fracture or traumatic malalignment of the thoracolumbar spine.    Cardiomegaly with diffuse bronchial wall thickening likely due to mild edema.      Electronically signed by: Trudi Villa  Date:    07/21/2022  Time:    19:08             Narrative:    EXAMINATION:  CT THORACIC SPINE WITHOUT CONTRAST; CT LUMBAR SPINE WITHOUT CONTRAST    CLINICAL HISTORY:  Back trauma, no prior imaging (Age >= 16y);; Low back pain, trauma;    TECHNIQUE:  Low-dose CT images obtained throughout the region of the thoracic and lumbar spine.  Axial, sagittal and coronal reformations were performed.  Contrast was not administered.    COMPARISON:  Thoracic spine radiographs 07/07/2022, CT chest, abdomen pelvis 12/01/2021    FINDINGS:  Left chest wall AICD in place.    CT thoracic spine: The vertebral bodies are normal in height and morphology without evidence of fracture or osseous destructive process.    Normal sagittal alignment is preserved.  No spondylolisthesis.    Mild degenerative changes without evidence of bony spinal canal stenosis or high grade neural foraminal narrowing.    Limited evaluation of the intraspinal contents demonstrates no hematoma or mass.    Cardiomegaly.  Diffuse bronchial wall thickening.  Bibasilar atelectasis..  Stable right adrenal nodule.    CT lumbar spine:    The vertebral bodies are normal in height and morphology without evidence of fracture or osseous destructive process.    Normal sagittal alignment is preserved. No  spondylolisthesis.    Mild degenerative changes without evidence of bony spinal canal stenosis or high grade neural foraminal narrowing.  Degenerative changes worse at L4-L5 and L5-S1.    Limited evaluation of the intraspinal contents demonstrates no hematoma or mass.                               CT Lumbar Spine Without Contrast (Final result)  Result time 07/21/22 19:08:23    Final result by Trudi Villa MD (07/21/22 19:08:23)                 Impression:      No fracture or traumatic malalignment of the thoracolumbar spine.    Cardiomegaly with diffuse bronchial wall thickening likely due to mild edema.      Electronically signed by: Trudi Villa  Date:    07/21/2022  Time:    19:08             Narrative:    EXAMINATION:  CT THORACIC SPINE WITHOUT CONTRAST; CT LUMBAR SPINE WITHOUT CONTRAST    CLINICAL HISTORY:  Back trauma, no prior imaging (Age >= 16y);; Low back pain, trauma;    TECHNIQUE:  Low-dose CT images obtained throughout the region of the thoracic and lumbar spine.  Axial, sagittal and coronal reformations were performed.  Contrast was not administered.    COMPARISON:  Thoracic spine radiographs 07/07/2022, CT chest, abdomen pelvis 12/01/2021    FINDINGS:  Left chest wall AICD in place.    CT thoracic spine: The vertebral bodies are normal in height and morphology without evidence of fracture or osseous destructive process.    Normal sagittal alignment is preserved.  No spondylolisthesis.    Mild degenerative changes without evidence of bony spinal canal stenosis or high grade neural foraminal narrowing.    Limited evaluation of the intraspinal contents demonstrates no hematoma or mass.    Cardiomegaly.  Diffuse bronchial wall thickening.  Bibasilar atelectasis..  Stable right adrenal nodule.    CT lumbar spine:    The vertebral bodies are normal in height and morphology without evidence of fracture or osseous destructive process.    Normal sagittal alignment is preserved. No spondylolisthesis.    Mild  degenerative changes without evidence of bony spinal canal stenosis or high grade neural foraminal narrowing.  Degenerative changes worse at L4-L5 and L5-S1.    Limited evaluation of the intraspinal contents demonstrates no hematoma or mass.                               X-Ray Hip 2 or 3 views Right (with Pelvis when performed) (Final result)  Result time 07/21/22 18:58:38    Final result by Trudi Villa MD (07/21/22 18:58:38)                 Impression:      No acute osseous abnormality.      Electronically signed by: Trudi Villa  Date:    07/21/2022  Time:    18:58             Narrative:    EXAMINATION:  XR HIP WITH PELVIS WHEN PERFORMED, 2 OR 3  VIEWS RIGHT    CLINICAL HISTORY:  Unspecified fall, initial encounter    TECHNIQUE:  AP view of the pelvis and frog leg lateral view of the right hip were performed.    COMPARISON:  Right hip radiographs 05/10/2022    FINDINGS:  The sacroiliac joints are symmetric.  The femoral heads are well seated within the acetabula.  Pubic symphysis is not widened..  No acute, displaced fracture.  Mild degenerative changes of the hips.                               CT Cervical Spine Without Contrast (Final result)  Result time 07/21/22 18:55:50    Final result by Trudi Villa MD (07/21/22 18:55:50)                 Impression:      No acute intracranial abnormality.    Left cerebellar hemisphere hypoattenuating focus unchanged compared to most recent prior exam, may be an evolving subacute or remote lacunar infarct.  Consider further evaluation with nonemergent brain MRI for further evaluation, as clinically indicated.    No acute fracture or subluxation of the cervical spine.      Electronically signed by: Trudi Villa  Date:    07/21/2022  Time:    18:55             Narrative:    EXAMINATION:  CT HEAD WITHOUT CONTRAST; CT CERVICAL SPINE WITHOUT CONTRAST    CLINICAL HISTORY:  Head trauma, moderate-severe;on eliquis;; Neck trauma, midline tenderness (Age  16-64y);    TECHNIQUE:  Low dose axial images were obtained through the head and C-spine.  Coronal and sagittal reformations were also performed. Contrast was not administered.    COMPARISON:  Multiple prior head CTs, most recent 07/07/2022; CT chest 12/01/2021    FINDINGS:  CT head: The soft tissues are unremarkable.  The paranasal sinuses and mastoid air cells are predominantly clear.  No acute calvarial abnormality.    No intracranial hemorrhage.  Left cerebellar hemisphere hypoattenuating focus, unchanged compared to 07/07/2022 exam, although not definitely seen on more remote priors.  No hydrocephalus.  No evidence of acute large territory infarct.  No evidence of intracranial mass or mass effect.    CT C-spine: The craniocervical junction is maintained with mild degenerative change.  Normal sagittal alignment.  No prevertebral soft tissue swelling.  Vertebral body heights are maintained.  Intervertebral disc space heights are maintained.  No acute, displaced fracture or aggressive osseous abnormality.  The dens and lateral masses are intact aligned.                               CT Head Without Contrast (Final result)  Result time 07/21/22 18:55:50    Final result by Trudi Villa MD (07/21/22 18:55:50)                 Impression:      No acute intracranial abnormality.    Left cerebellar hemisphere hypoattenuating focus unchanged compared to most recent prior exam, may be an evolving subacute or remote lacunar infarct.  Consider further evaluation with nonemergent brain MRI for further evaluation, as clinically indicated.    No acute fracture or subluxation of the cervical spine.      Electronically signed by: Trudi Villa  Date:    07/21/2022  Time:    18:55             Narrative:    EXAMINATION:  CT HEAD WITHOUT CONTRAST; CT CERVICAL SPINE WITHOUT CONTRAST    CLINICAL HISTORY:  Head trauma, moderate-severe;on eliquis;; Neck trauma, midline tenderness (Age 16-64y);    TECHNIQUE:  Low dose axial images were  obtained through the head and C-spine.  Coronal and sagittal reformations were also performed. Contrast was not administered.    COMPARISON:  Multiple prior head CTs, most recent 07/07/2022; CT chest 12/01/2021    FINDINGS:  CT head: The soft tissues are unremarkable.  The paranasal sinuses and mastoid air cells are predominantly clear.  No acute calvarial abnormality.    No intracranial hemorrhage.  Left cerebellar hemisphere hypoattenuating focus, unchanged compared to 07/07/2022 exam, although not definitely seen on more remote priors.  No hydrocephalus.  No evidence of acute large territory infarct.  No evidence of intracranial mass or mass effect.    CT C-spine: The craniocervical junction is maintained with mild degenerative change.  Normal sagittal alignment.  No prevertebral soft tissue swelling.  Vertebral body heights are maintained.  Intervertebral disc space heights are maintained.  No acute, displaced fracture or aggressive osseous abnormality.  The dens and lateral masses are intact aligned.                                 Medications   ondansetron disintegrating tablet 4 mg (4 mg Oral Given 7/21/22 1906)   morphine injection 4 mg (4 mg Intramuscular Given 7/21/22 1906)     Medical Decision Making:   Differential Diagnosis:   DDx: head bleed, skull fx, cspine fx, lspine fx, tspine fx, hip fx, foot fx  ED Management:  Based on the patient's evaluation - patient appears well for discharge home. CT head, entire spine, x-ray right hip/pelvis, left foot negative for acute injuries. Likely bumps and bruises. Will plan to discharge home with PCP f/u as needed. Return precautions given. Patient is in agreement with the plan.                      Clinical Impression:   Final diagnoses:  [W19.XXXA] Fall (Primary)  [G44.319] Acute post-traumatic headache, not intractable  [M54.2] Acute neck pain  [M54.6] Acute midline thoracic back pain  [M54.50] Acute midline low back pain without sciatica  [M25.551] Acute right  hip pain  [M79.18] Right buttock pain  [M79.672] Left foot pain          ED Disposition Condition    Discharge Stable        ED Prescriptions     None        Follow-up Information     Follow up With Specialties Details Why Contact Info    Cristobal Ann MD Family Medicine Schedule an appointment as soon as possible for a visit in 1 week As needed [address] [facility] [address]  [phone]             Bentley Beard DO  07/21/22 1929

## 2022-07-21 NOTE — ED TRIAGE NOTES
Pt to ED via EMS after a slip and fall down stairs. Pt reports 10/10 generalized back pain and rib pain. Pt in c-collar from EMS. 2L NC, baseline. No obvious deformity to neck or back during triage. Left ankle swelling noted.

## 2022-07-22 NOTE — DISCHARGE INSTRUCTIONS
Return to the ED if you have worsening headaches, nausea, vomiting, numbness, weakness, or difficulty with bowel/bladder function.

## 2022-07-25 DIAGNOSIS — M67.40 GANGLION CYST: ICD-10-CM

## 2022-07-25 DIAGNOSIS — M77.12 LEFT LATERAL EPICONDYLITIS: ICD-10-CM

## 2022-07-25 DIAGNOSIS — M25.532 LEFT WRIST PAIN: ICD-10-CM

## 2022-07-25 DIAGNOSIS — M1A.9XX0 CHRONIC GOUT WITHOUT TOPHUS, UNSPECIFIED CAUSE, UNSPECIFIED SITE: ICD-10-CM

## 2022-07-25 NOTE — TELEPHONE ENCOUNTER
No new care gaps identified.  [facility] Embedded Care Gaps. Reference number: [id number]. 7/25/2022   2:33:04 PM CDT

## 2022-07-26 ENCOUNTER — TELEPHONE (OUTPATIENT)
Dept: FAMILY MEDICINE | Facility: CLINIC | Age: 57
End: 2022-07-26
Payer: MEDICAID

## 2022-07-26 RX ORDER — DICLOFENAC SODIUM 10 MG/G
2 GEL TOPICAL 3 TIMES DAILY PRN
Qty: 400 G | Refills: 0 | Status: SHIPPED | OUTPATIENT
Start: 2022-07-26 | End: 2022-08-26

## 2022-07-26 RX ORDER — ALLOPURINOL 100 MG/1
TABLET ORAL
Qty: 60 TABLET | Refills: 2 | OUTPATIENT
Start: 2022-07-26

## 2022-07-26 NOTE — TELEPHONE ENCOUNTER
----- Message from Frank Cook sent at 2022  2:00 PM CDT -----  Contact: Patient  Hafsa Hawley  MRN: [id number]  : 1965  PCP: Cristobla Ann  Home Phone      [phone]  Work Phone      Not on file.  Mobile          [phone]  Home Phone      [phone]      MESSAGE: seen in Er for a fall - requesting f/u appt     Call [phone]    PCP: Seth

## 2022-07-30 ENCOUNTER — CLINICAL SUPPORT (OUTPATIENT)
Dept: CARDIOLOGY | Facility: HOSPITAL | Age: 57
End: 2022-07-30
Payer: MEDICAID

## 2022-07-30 DIAGNOSIS — Z95.810 PRESENCE OF AUTOMATIC (IMPLANTABLE) CARDIAC DEFIBRILLATOR: ICD-10-CM

## 2022-07-30 PROCEDURE — 93296 REM INTERROG EVL PM/IDS: CPT | Performed by: INTERNAL MEDICINE

## 2022-07-30 PROCEDURE — 93295 DEV INTERROG REMOTE 1/2/MLT: CPT | Mod: ,,, | Performed by: INTERNAL MEDICINE

## 2022-07-30 PROCEDURE — 93295 CARDIAC DEVICE CHECK - REMOTE: ICD-10-PCS | Mod: ,,, | Performed by: INTERNAL MEDICINE

## 2022-08-23 ENCOUNTER — CLINICAL SUPPORT (OUTPATIENT)
Dept: FAMILY MEDICINE | Facility: CLINIC | Age: 57
End: 2022-08-23
Payer: MEDICAID

## 2022-08-23 ENCOUNTER — TELEPHONE (OUTPATIENT)
Dept: FAMILY MEDICINE | Facility: CLINIC | Age: 57
End: 2022-08-23
Payer: MEDICAID

## 2022-08-23 ENCOUNTER — OFFICE VISIT (OUTPATIENT)
Dept: FAMILY MEDICINE | Facility: CLINIC | Age: 57
End: 2022-08-23
Payer: MEDICAID

## 2022-08-23 VITALS
WEIGHT: 211.56 LBS | RESPIRATION RATE: 16 BRPM | DIASTOLIC BLOOD PRESSURE: 70 MMHG | BODY MASS INDEX: 34 KG/M2 | SYSTOLIC BLOOD PRESSURE: 126 MMHG | HEIGHT: 66 IN

## 2022-08-23 DIAGNOSIS — M51.36 LUMBAR DEGENERATIVE DISC DISEASE: ICD-10-CM

## 2022-08-23 DIAGNOSIS — N18.32 STAGE 3B CHRONIC KIDNEY DISEASE: ICD-10-CM

## 2022-08-23 DIAGNOSIS — I50.42 CHRONIC COMBINED SYSTOLIC AND DIASTOLIC HEART FAILURE: ICD-10-CM

## 2022-08-23 DIAGNOSIS — E11.65 TYPE 2 DIABETES MELLITUS WITH HYPERGLYCEMIA, WITHOUT LONG-TERM CURRENT USE OF INSULIN: ICD-10-CM

## 2022-08-23 DIAGNOSIS — E11.9 TYPE 2 DIABETES MELLITUS WITHOUT COMPLICATION, WITHOUT LONG-TERM CURRENT USE OF INSULIN: ICD-10-CM

## 2022-08-23 DIAGNOSIS — J96.12 CHRONIC RESPIRATORY FAILURE WITH HYPOXIA AND HYPERCAPNIA: ICD-10-CM

## 2022-08-23 DIAGNOSIS — M10.9 ACUTE GOUT OF LEFT FOOT, UNSPECIFIED CAUSE: Primary | ICD-10-CM

## 2022-08-23 DIAGNOSIS — J96.11 CHRONIC RESPIRATORY FAILURE WITH HYPOXIA AND HYPERCAPNIA: ICD-10-CM

## 2022-08-23 PROBLEM — I11.0 HYPERTENSIVE HEART DISEASE WITH CONGESTIVE HEART FAILURE: Status: ACTIVE | Noted: 2017-12-20

## 2022-08-23 LAB
ANION GAP SERPL CALC-SCNC: 11 MMOL/L (ref 8–16)
BUN SERPL-MCNC: 35 MG/DL (ref 6–20)
CALCIUM SERPL-MCNC: 9.5 MG/DL (ref 8.7–10.5)
CHLORIDE SERPL-SCNC: 109 MMOL/L (ref 95–110)
CO2 SERPL-SCNC: 23 MMOL/L (ref 23–29)
CREAT SERPL-MCNC: 1.6 MG/DL (ref 0.5–1.4)
EST. GFR  (NO RACE VARIABLE): 38 ML/MIN/1.73 M^2
ESTIMATED AVG GLUCOSE: 140 MG/DL (ref 68–131)
GLUCOSE SERPL-MCNC: 96 MG/DL (ref 70–110)
HBA1C MFR BLD: 6.5 % (ref 4–5.6)
POTASSIUM SERPL-SCNC: 4.1 MMOL/L (ref 3.5–5.1)
SODIUM SERPL-SCNC: 143 MMOL/L (ref 136–145)

## 2022-08-23 PROCEDURE — 3044F PR MOST RECENT HEMOGLOBIN A1C LEVEL <7.0%: ICD-10-PCS | Mod: CPTII,,, | Performed by: STUDENT IN AN ORGANIZED HEALTH CARE EDUCATION/TRAINING PROGRAM

## 2022-08-23 PROCEDURE — 80048 BASIC METABOLIC PNL TOTAL CA: CPT | Performed by: STUDENT IN AN ORGANIZED HEALTH CARE EDUCATION/TRAINING PROGRAM

## 2022-08-23 PROCEDURE — 4010F ACE/ARB THERAPY RXD/TAKEN: CPT | Mod: CPTII,,, | Performed by: STUDENT IN AN ORGANIZED HEALTH CARE EDUCATION/TRAINING PROGRAM

## 2022-08-23 PROCEDURE — 4010F PR ACE/ARB THEARPY RXD/TAKEN: ICD-10-PCS | Mod: CPTII,,, | Performed by: STUDENT IN AN ORGANIZED HEALTH CARE EDUCATION/TRAINING PROGRAM

## 2022-08-23 PROCEDURE — 1159F MED LIST DOCD IN RCRD: CPT | Mod: CPTII,,, | Performed by: STUDENT IN AN ORGANIZED HEALTH CARE EDUCATION/TRAINING PROGRAM

## 2022-08-23 PROCEDURE — 99215 OFFICE O/P EST HI 40 MIN: CPT | Mod: PBBFAC | Performed by: STUDENT IN AN ORGANIZED HEALTH CARE EDUCATION/TRAINING PROGRAM

## 2022-08-23 PROCEDURE — 83036 HEMOGLOBIN GLYCOSYLATED A1C: CPT | Performed by: STUDENT IN AN ORGANIZED HEALTH CARE EDUCATION/TRAINING PROGRAM

## 2022-08-23 PROCEDURE — 36415 COLL VENOUS BLD VENIPUNCTURE: CPT | Mod: PBBFAC

## 2022-08-23 PROCEDURE — 99999 PR PBB SHADOW E&M-EST. PATIENT-LVL V: CPT | Mod: PBBFAC,,, | Performed by: STUDENT IN AN ORGANIZED HEALTH CARE EDUCATION/TRAINING PROGRAM

## 2022-08-23 PROCEDURE — 3066F PR DOCUMENTATION OF TREATMENT FOR NEPHROPATHY: ICD-10-PCS | Mod: CPTII,,, | Performed by: STUDENT IN AN ORGANIZED HEALTH CARE EDUCATION/TRAINING PROGRAM

## 2022-08-23 PROCEDURE — 1160F RVW MEDS BY RX/DR IN RCRD: CPT | Mod: CPTII,,, | Performed by: STUDENT IN AN ORGANIZED HEALTH CARE EDUCATION/TRAINING PROGRAM

## 2022-08-23 PROCEDURE — 3066F NEPHROPATHY DOC TX: CPT | Mod: CPTII,,, | Performed by: STUDENT IN AN ORGANIZED HEALTH CARE EDUCATION/TRAINING PROGRAM

## 2022-08-23 PROCEDURE — 99214 PR OFFICE/OUTPT VISIT, EST, LEVL IV, 30-39 MIN: ICD-10-PCS | Mod: S$PBB,,, | Performed by: STUDENT IN AN ORGANIZED HEALTH CARE EDUCATION/TRAINING PROGRAM

## 2022-08-23 PROCEDURE — 3008F BODY MASS INDEX DOCD: CPT | Mod: CPTII,,, | Performed by: STUDENT IN AN ORGANIZED HEALTH CARE EDUCATION/TRAINING PROGRAM

## 2022-08-23 PROCEDURE — 3078F PR MOST RECENT DIASTOLIC BLOOD PRESSURE < 80 MM HG: ICD-10-PCS | Mod: CPTII,,, | Performed by: STUDENT IN AN ORGANIZED HEALTH CARE EDUCATION/TRAINING PROGRAM

## 2022-08-23 PROCEDURE — 3074F PR MOST RECENT SYSTOLIC BLOOD PRESSURE < 130 MM HG: ICD-10-PCS | Mod: CPTII,,, | Performed by: STUDENT IN AN ORGANIZED HEALTH CARE EDUCATION/TRAINING PROGRAM

## 2022-08-23 PROCEDURE — 1160F PR REVIEW ALL MEDS BY PRESCRIBER/CLIN PHARMACIST DOCUMENTED: ICD-10-PCS | Mod: CPTII,,, | Performed by: STUDENT IN AN ORGANIZED HEALTH CARE EDUCATION/TRAINING PROGRAM

## 2022-08-23 PROCEDURE — 99214 OFFICE O/P EST MOD 30 MIN: CPT | Mod: S$PBB,,, | Performed by: STUDENT IN AN ORGANIZED HEALTH CARE EDUCATION/TRAINING PROGRAM

## 2022-08-23 PROCEDURE — 96372 THER/PROPH/DIAG INJ SC/IM: CPT | Mod: PBBFAC

## 2022-08-23 PROCEDURE — 1159F PR MEDICATION LIST DOCUMENTED IN MEDICAL RECORD: ICD-10-PCS | Mod: CPTII,,, | Performed by: STUDENT IN AN ORGANIZED HEALTH CARE EDUCATION/TRAINING PROGRAM

## 2022-08-23 PROCEDURE — 3078F DIAST BP <80 MM HG: CPT | Mod: CPTII,,, | Performed by: STUDENT IN AN ORGANIZED HEALTH CARE EDUCATION/TRAINING PROGRAM

## 2022-08-23 PROCEDURE — 99999 PR PBB SHADOW E&M-EST. PATIENT-LVL V: ICD-10-PCS | Mod: PBBFAC,,, | Performed by: STUDENT IN AN ORGANIZED HEALTH CARE EDUCATION/TRAINING PROGRAM

## 2022-08-23 PROCEDURE — 3044F HG A1C LEVEL LT 7.0%: CPT | Mod: CPTII,,, | Performed by: STUDENT IN AN ORGANIZED HEALTH CARE EDUCATION/TRAINING PROGRAM

## 2022-08-23 PROCEDURE — 3008F PR BODY MASS INDEX (BMI) DOCUMENTED: ICD-10-PCS | Mod: CPTII,,, | Performed by: STUDENT IN AN ORGANIZED HEALTH CARE EDUCATION/TRAINING PROGRAM

## 2022-08-23 PROCEDURE — 3074F SYST BP LT 130 MM HG: CPT | Mod: CPTII,,, | Performed by: STUDENT IN AN ORGANIZED HEALTH CARE EDUCATION/TRAINING PROGRAM

## 2022-08-23 RX ORDER — PREDNISONE 20 MG/1
40 TABLET ORAL DAILY
Qty: 10 TABLET | Refills: 0 | Status: SHIPPED | OUTPATIENT
Start: 2022-08-23 | End: 2022-08-28

## 2022-08-23 RX ORDER — LIDOCAINE 50 MG/G
1 PATCH TOPICAL DAILY
Qty: 30 PATCH | Refills: 2 | Status: SHIPPED | OUTPATIENT
Start: 2022-08-23 | End: 2022-10-12 | Stop reason: SDUPTHER

## 2022-08-23 RX ORDER — SACUBITRIL AND VALSARTAN 97; 103 MG/1; MG/1
1 TABLET, FILM COATED ORAL 2 TIMES DAILY
Qty: 60 TABLET | Refills: 5 | Status: SHIPPED | OUTPATIENT
Start: 2022-08-23 | End: 2023-01-20 | Stop reason: SDUPTHER

## 2022-08-23 RX ORDER — HYDROCODONE BITARTRATE AND ACETAMINOPHEN 5; 325 MG/1; MG/1
1 TABLET ORAL EVERY 8 HOURS PRN
Qty: 21 TABLET | Refills: 0 | Status: SHIPPED | OUTPATIENT
Start: 2022-08-23 | End: 2022-08-23

## 2022-08-23 RX ORDER — HYDROCODONE BITARTRATE AND ACETAMINOPHEN 5; 325 MG/1; MG/1
1 TABLET ORAL EVERY 8 HOURS PRN
Qty: 21 TABLET | Refills: 0 | Status: SHIPPED | OUTPATIENT
Start: 2022-08-23 | End: 2022-08-30

## 2022-08-23 RX ORDER — TRIAMCINOLONE ACETONIDE 40 MG/ML
80 INJECTION, SUSPENSION INTRA-ARTICULAR; INTRAMUSCULAR
Status: COMPLETED | OUTPATIENT
Start: 2022-08-23 | End: 2022-08-23

## 2022-08-23 RX ADMIN — TRIAMCINOLONE ACETONIDE 80 MG: 40 INJECTION, SUSPENSION INTRA-ARTICULAR; INTRAMUSCULAR at 02:08

## 2022-08-23 NOTE — TELEPHONE ENCOUNTER
----- Message from Myla Calvert sent at 2022  1:41 PM CDT -----  Contact: Self  Hafsa Hawley  MRN: [id number]  : 1965  PCP: Cristobal Ann  Home Phone      [phone]  Work Phone      Not on file.  Mobile          [phone]  Home Phone      [phone]      MESSAGE:     Pt states she is a Dr. Ann patient but wants to see shadia instead. Pt states she has intense pain in legs and hip and feels Dr. Reza could better assist her. Please advise.      [phone]

## 2022-08-23 NOTE — PROGRESS NOTES
Subjective:       Patient ID: Hafsa Hawley is a 56 y.o. female.    Chief Complaint: Follow-up    Pt here for follow-up    Gout: we attempted to start uloric as pt was on max dose allopurinol considering her CKD and still have gout flares. The uloric was not covered. Pt is having significant gout pain in the left foot today with swelling.     CKD: per chart review, looks like she was scheduled to see nephrology at [facility].     Pt also requesting refill of entresto for heart failure.    Chronic resp failure: pt was placed on home oxygen in 2019 when in hospital. She currently uses oxygen at home 2L as needed.     She has depressed mood, but states she does not want anything to take for that. She is dealing with it. States her pain is the main reason she gets depressed.     Review of Systems   Constitutional: Negative for chills and fever.   HENT: Negative for congestion and sore throat.    Respiratory: Negative for cough and shortness of breath.    Cardiovascular: Negative for chest pain.   Genitourinary: Negative for dysuria and hematuria.   Musculoskeletal: Positive for arthralgias and joint swelling.   Neurological: Negative for dizziness, syncope and light-headedness.       Objective:      Physical Exam   Constitutional: No distress.   HENT:   Head: Normocephalic and atraumatic.   Eyes: Conjunctivae are normal.   Cardiovascular: Normal rate, regular rhythm and normal heart sounds.   No murmur heard.  Pulmonary/Chest: Effort normal and breath sounds normal. No respiratory distress.   Musculoskeletal:      Right lower leg: Edema (trace) present.      Left lower leg: Edema (trace) present.      Comments: TTP left foot with mild swelling; no warmth or erythema   Neurological: She is alert.   Vitals reviewed.      Assessment:       1. Acute gout of left foot, unspecified cause    2. Type 2 diabetes mellitus without complication, without long-term current use of insulin    3. Stage 3b chronic kidney disease    4.  Chronic combined systolic and diastolic heart failure    5. Type 2 diabetes mellitus with hyperglycemia, without long-term current use of insulin    6. Chronic respiratory failure with hypoxia and hypercapnia    7. Lumbar degenerative disc disease        Plan:       Acute gout of left foot, unspecified cause  -     triamcinolone acetonide injection 80 mg  -     predniSONE (DELTASONE) 20 MG tablet; Take 2 tablets (40 mg total) by mouth once daily. for 5 days  Dispense: 10 tablet; Refill: 0  -     Discontinue: HYDROcodone-acetaminophen (NORCO) 5-325 mg per tablet; Take 1 tablet by mouth every 8 (eight) hours as needed for Pain.  Dispense: 21 tablet; Refill: 0  -     HYDROcodone-acetaminophen (NORCO) 5-325 mg per tablet; Take 1 tablet by mouth every 8 (eight) hours as needed for Pain.  Dispense: 21 tablet; Refill: 0    Type 2 diabetes mellitus without complication, without long-term current use of insulin  -     Hemoglobin A1C; Future; Expected date: 08/23/2022  -     Basic Metabolic Panel; Future; Expected date: 08/23/2022    Stage 3b chronic kidney disease  -     Basic Metabolic Panel; Future; Expected date: 08/23/2022    Chronic combined systolic and diastolic heart failure  -     sacubitriL-valsartan (ENTRESTO)  mg per tablet; Take 1 tablet by mouth 2 (two) times daily.  Dispense: 60 tablet; Refill: 5    Type 2 diabetes mellitus with hyperglycemia, without long-term current use of insulin  -     blood sugar diagnostic (BLOOD GLUCOSE TEST) Strp; 1 strip by Misc.(Non-Drug; Combo Route) route 3 (three) times daily.  Dispense: 100 strip; Refill: 11    Chronic respiratory failure with hypoxia and hypercapnia    Lumbar degenerative disc disease  -     LIDOcaine (LIDODERM) 5 %; Place 1 patch onto the skin once daily. Remove & Discard patch within 12 hours or as directed by MD  Dispense: 30 patch; Refill: 2    cont allopurinol at current dosing, but with CKD I would not want to increase any further. uloric not  covered by insurance. Will treat gout flare with steroids today. She is also requesting refill of norco for gout flare pain  Needs to follow-up with nephrology. Per chart review, they contacted her in June for labs needed prior to visit but I do not see a visit documented.   Follow-up cardiology/transplant as scheduled  Refill entresto per patient request  She was referred to pulm in the past but is unsure who is following with her  RTC 3 months or sooner if needed

## 2022-08-23 NOTE — TELEPHONE ENCOUNTER
Spoke with pt--she said that she will keep her appt with MB today. She stated that she already talked with someone about this.

## 2022-09-12 ENCOUNTER — TELEPHONE (OUTPATIENT)
Dept: FAMILY MEDICINE | Facility: CLINIC | Age: 57
End: 2022-09-12
Payer: COMMERCIAL

## 2022-09-12 DIAGNOSIS — M10.9 GOUT, ARTHRITIS: Primary | ICD-10-CM

## 2022-09-12 NOTE — TELEPHONE ENCOUNTER
----- Message from Frank Cook sent at 2022  1:36 PM CDT -----  Hafsa Hawley  MRN: [id number]  : 1965  PCP: Cristobal Ann  Home Phone      [phone]  Work Phone      Not on file.  Mobile          [phone]  Home Phone      [phone]      MESSAGE: Gout - states she normally gets it in one foot - now with it in both feet -- requesting to speak with nurse - will either need appt or Rx for pain medication (Hydrocodone 5-325 mg) -also, states she needs Rx for Entresto, was getting it from her cardiologist , told to ask PCP -- uses [facility] in [place]    Call [phone]    PCP: Seth

## 2022-09-12 NOTE — TELEPHONE ENCOUNTER
Patient states she has gout now in both feet and is in a lot of pain. Would like prednisolone and Norco 5 called in for her to help with the pain.    Please advise

## 2022-09-13 RX ORDER — HYDROCODONE BITARTRATE AND ACETAMINOPHEN 5; 325 MG/1; MG/1
1 TABLET ORAL EVERY 8 HOURS PRN
Qty: 21 TABLET | Refills: 0 | Status: SHIPPED | OUTPATIENT
Start: 2022-09-13 | End: 2022-09-20

## 2022-09-13 RX ORDER — PREDNISONE 20 MG/1
20 TABLET ORAL 2 TIMES DAILY
Qty: 6 TABLET | Refills: 0 | Status: SHIPPED | OUTPATIENT
Start: 2022-09-13 | End: 2022-09-16

## 2022-09-14 ENCOUNTER — DOCUMENTATION ONLY (OUTPATIENT)
Dept: TRANSPLANT | Facility: CLINIC | Age: 57
End: 2022-09-14
Payer: COMMERCIAL

## 2022-09-14 NOTE — PROGRESS NOTES
Patient removed from VAD potential list due to transfer to CHF section. VAD to be re- consulted if needed in future.

## 2022-09-15 ENCOUNTER — TELEPHONE (OUTPATIENT)
Dept: NEPHROLOGY | Facility: CLINIC | Age: 57
End: 2022-09-15
Payer: COMMERCIAL

## 2022-10-04 ENCOUNTER — TELEPHONE (OUTPATIENT)
Dept: TRANSPLANT | Facility: CLINIC | Age: 57
End: 2022-10-04
Payer: MEDICAID

## 2022-10-04 ENCOUNTER — TELEPHONE (OUTPATIENT)
Dept: CARDIOLOGY | Facility: HOSPITAL | Age: 57
End: 2022-10-04
Payer: MEDICAID

## 2022-10-04 NOTE — TELEPHONE ENCOUNTER
Returned patients call.  No answer.  Left a voice message for her to return my call.  I left the number to the device clinic.   ----- Message from Ina Woody sent at 10/4/2022  4:04 PM CDT -----  Regarding: Transmission  Pt [phone] would like to know if any transmissions were received from her device. She's have chest pains off and on with a shocking/stinging feeling, she's feeling it in her left breast and sometimes radiating to her back. The call was also transferred to the nurse in CHF.     Thanks

## 2022-10-04 NOTE — TELEPHONE ENCOUNTER
4:00 pm:  Received call from [facility] phone  stating pt on the phone and reporting chest pain, shocks, and not feeling well  I took the call right then and spoke with pt   Pt said the same thing   Also pt reported this pain has been sharp and on and off for days -occurred today  Pain in chest and sometimes goes around the side and to her back   Also said feels like a light shock at times  Experienced the shock today   Pt asked if I could see that and I explained this is the Heart Failure clinic and we do not access or capability to see review this  Pt not feeling well    Advised pt, likely in her best interest to go to the nearest emergency room  pt agreed and lives near [facility]   Also asked and pt does have someone who can take her there today, shortly  Asked pt to please keep us informed and said she would do so.

## 2022-10-17 ENCOUNTER — TELEPHONE (OUTPATIENT)
Dept: FAMILY MEDICINE | Facility: CLINIC | Age: 57
End: 2022-10-17
Payer: MEDICAID

## 2022-10-17 ENCOUNTER — HOSPITAL ENCOUNTER (EMERGENCY)
Facility: HOSPITAL | Age: 57
Discharge: HOME | End: 2022-10-17
Attending: STUDENT IN AN ORGANIZED HEALTH CARE EDUCATION/TRAINING PROGRAM
Payer: MEDICAID

## 2022-10-17 VITALS
WEIGHT: 209.19 LBS | RESPIRATION RATE: 18 BRPM | BODY MASS INDEX: 33.77 KG/M2 | HEART RATE: 78 BPM | DIASTOLIC BLOOD PRESSURE: 88 MMHG | TEMPERATURE: 97 F | OXYGEN SATURATION: 94 % | SYSTOLIC BLOOD PRESSURE: 153 MMHG

## 2022-10-17 DIAGNOSIS — R06.02 SHORTNESS OF BREATH: ICD-10-CM

## 2022-10-17 LAB
ALBUMIN SERPL BCP-MCNC: 3.7 G/DL (ref 3.5–5.2)
ALP SERPL-CCNC: 62 U/L (ref 55–135)
ALT SERPL W/O P-5'-P-CCNC: 19 U/L (ref 10–44)
ANION GAP SERPL CALC-SCNC: 10 MMOL/L (ref 8–16)
ANISOCYTOSIS BLD QL SMEAR: ABNORMAL
AST SERPL-CCNC: 17 U/L (ref 10–40)
BASOPHILS # BLD AUTO: 0.03 K/UL (ref 0–0.2)
BASOPHILS NFR BLD: 0.6 % (ref 0–1.9)
BILIRUB SERPL-MCNC: 1.4 MG/DL (ref 0.1–1)
BNP SERPL-MCNC: 1557 PG/ML (ref 0–99)
BUN SERPL-MCNC: 35 MG/DL (ref 6–20)
CALCIUM SERPL-MCNC: 9.3 MG/DL (ref 8.7–10.5)
CHLORIDE SERPL-SCNC: 110 MMOL/L (ref 95–110)
CO2 SERPL-SCNC: 23 MMOL/L (ref 23–29)
CREAT SERPL-MCNC: 1.6 MG/DL (ref 0.5–1.4)
D DIMER PPP IA.FEU-MCNC: 0.4 MG/L FEU
DIFFERENTIAL METHOD: ABNORMAL
EOSINOPHIL # BLD AUTO: 0.2 K/UL (ref 0–0.5)
EOSINOPHIL NFR BLD: 4 % (ref 0–8)
ERYTHROCYTE [DISTWIDTH] IN BLOOD BY AUTOMATED COUNT: 27.6 % (ref 11.5–14.5)
EST. GFR  (NO RACE VARIABLE): 37 ML/MIN/1.73 M^2
GLUCOSE SERPL-MCNC: 153 MG/DL (ref 70–110)
HCT VFR BLD AUTO: 32.2 % (ref 37–48.5)
HGB BLD-MCNC: 9.2 G/DL (ref 12–16)
HYPOCHROMIA BLD QL SMEAR: ABNORMAL
IMM GRANULOCYTES # BLD AUTO: 0.02 K/UL (ref 0–0.04)
IMM GRANULOCYTES NFR BLD AUTO: 0.4 % (ref 0–0.5)
LYMPHOCYTES # BLD AUTO: 1 K/UL (ref 1–4.8)
LYMPHOCYTES NFR BLD: 21.6 % (ref 18–48)
MCH RBC QN AUTO: 17.2 PG (ref 27–31)
MCHC RBC AUTO-ENTMCNC: 28.6 G/DL (ref 32–36)
MCV RBC AUTO: 60 FL (ref 82–98)
MONOCYTES # BLD AUTO: 0.4 K/UL (ref 0.3–1)
MONOCYTES NFR BLD: 8.2 % (ref 4–15)
NEUTROPHILS # BLD AUTO: 3.1 K/UL (ref 1.8–7.7)
NEUTROPHILS NFR BLD: 65.2 % (ref 38–73)
NRBC BLD-RTO: 1 /100 WBC
OVALOCYTES BLD QL SMEAR: ABNORMAL
PLATELET # BLD AUTO: 178 K/UL (ref 150–450)
PMV BLD AUTO: ABNORMAL FL (ref 9.2–12.9)
POIKILOCYTOSIS BLD QL SMEAR: ABNORMAL
POTASSIUM SERPL-SCNC: 4.2 MMOL/L (ref 3.5–5.1)
PROT SERPL-MCNC: 7.1 G/DL (ref 6–8.4)
RBC # BLD AUTO: 5.36 M/UL (ref 4–5.4)
SODIUM SERPL-SCNC: 143 MMOL/L (ref 136–145)
STOMATOCYTES BLD QL SMEAR: PRESENT
TROPONIN I SERPL DL<=0.01 NG/ML-MCNC: 1.14 NG/ML (ref 0–0.03)
WBC # BLD AUTO: 4.76 K/UL (ref 3.9–12.7)

## 2022-10-17 PROCEDURE — 93010 EKG 12-LEAD: ICD-10-PCS | Mod: ,,, | Performed by: INTERNAL MEDICINE

## 2022-10-17 PROCEDURE — 93005 ELECTROCARDIOGRAM TRACING: CPT

## 2022-10-17 PROCEDURE — 85379 FIBRIN DEGRADATION QUANT: CPT | Performed by: STUDENT IN AN ORGANIZED HEALTH CARE EDUCATION/TRAINING PROGRAM

## 2022-10-17 PROCEDURE — 99285 EMERGENCY DEPT VISIT HI MDM: CPT | Mod: 25

## 2022-10-17 PROCEDURE — 84484 ASSAY OF TROPONIN QUANT: CPT | Performed by: STUDENT IN AN ORGANIZED HEALTH CARE EDUCATION/TRAINING PROGRAM

## 2022-10-17 PROCEDURE — 36415 COLL VENOUS BLD VENIPUNCTURE: CPT | Performed by: STUDENT IN AN ORGANIZED HEALTH CARE EDUCATION/TRAINING PROGRAM

## 2022-10-17 PROCEDURE — 83880 ASSAY OF NATRIURETIC PEPTIDE: CPT | Performed by: STUDENT IN AN ORGANIZED HEALTH CARE EDUCATION/TRAINING PROGRAM

## 2022-10-17 PROCEDURE — 94640 AIRWAY INHALATION TREATMENT: CPT

## 2022-10-17 PROCEDURE — 93010 ELECTROCARDIOGRAM REPORT: CPT | Mod: ,,, | Performed by: INTERNAL MEDICINE

## 2022-10-17 PROCEDURE — 25000003 PHARM REV CODE 250: Performed by: STUDENT IN AN ORGANIZED HEALTH CARE EDUCATION/TRAINING PROGRAM

## 2022-10-17 PROCEDURE — 80053 COMPREHEN METABOLIC PANEL: CPT | Performed by: STUDENT IN AN ORGANIZED HEALTH CARE EDUCATION/TRAINING PROGRAM

## 2022-10-17 PROCEDURE — 63600175 PHARM REV CODE 636 W HCPCS: Performed by: STUDENT IN AN ORGANIZED HEALTH CARE EDUCATION/TRAINING PROGRAM

## 2022-10-17 PROCEDURE — 96374 THER/PROPH/DIAG INJ IV PUSH: CPT

## 2022-10-17 PROCEDURE — 85025 COMPLETE CBC W/AUTO DIFF WBC: CPT | Performed by: STUDENT IN AN ORGANIZED HEALTH CARE EDUCATION/TRAINING PROGRAM

## 2022-10-17 PROCEDURE — 25000242 PHARM REV CODE 250 ALT 637 W/ HCPCS: Performed by: STUDENT IN AN ORGANIZED HEALTH CARE EDUCATION/TRAINING PROGRAM

## 2022-10-17 RX ORDER — PREDNISONE 20 MG/1
40 TABLET ORAL DAILY
Qty: 10 TABLET | Refills: 0 | Status: SHIPPED | OUTPATIENT
Start: 2022-10-17 | End: 2022-10-22

## 2022-10-17 RX ORDER — ALBUTEROL SULFATE 0.63 MG/3ML
0.63 SOLUTION RESPIRATORY (INHALATION) EVERY 6 HOURS PRN
Qty: 75 ML | Refills: 0 | Status: ON HOLD | OUTPATIENT
Start: 2022-10-17 | End: 2022-11-03 | Stop reason: HOSPADM

## 2022-10-17 RX ORDER — FUROSEMIDE 10 MG/ML
40 INJECTION INTRAMUSCULAR; INTRAVENOUS
Status: COMPLETED | OUTPATIENT
Start: 2022-10-17 | End: 2022-10-17

## 2022-10-17 RX ORDER — IPRATROPIUM BROMIDE AND ALBUTEROL SULFATE 2.5; .5 MG/3ML; MG/3ML
3 SOLUTION RESPIRATORY (INHALATION)
Status: COMPLETED | OUTPATIENT
Start: 2022-10-17 | End: 2022-10-17

## 2022-10-17 RX ORDER — ONDANSETRON 4 MG/1
4 TABLET, ORALLY DISINTEGRATING ORAL
Status: COMPLETED | OUTPATIENT
Start: 2022-10-17 | End: 2022-10-17

## 2022-10-17 RX ORDER — PREDNISONE 20 MG/1
60 TABLET ORAL
Status: COMPLETED | OUTPATIENT
Start: 2022-10-17 | End: 2022-10-17

## 2022-10-17 RX ADMIN — IPRATROPIUM BROMIDE AND ALBUTEROL SULFATE 3 ML: .5; 3 SOLUTION RESPIRATORY (INHALATION) at 11:10

## 2022-10-17 RX ADMIN — PREDNISONE 60 MG: 20 TABLET ORAL at 11:10

## 2022-10-17 RX ADMIN — ONDANSETRON 4 MG: 4 TABLET, ORALLY DISINTEGRATING ORAL at 10:10

## 2022-10-17 RX ADMIN — FUROSEMIDE 40 MG: 10 INJECTION, SOLUTION INTRAMUSCULAR; INTRAVENOUS at 11:10

## 2022-10-17 NOTE — TELEPHONE ENCOUNTER
Spoke with pt this morning and discussed SOB and chest tightness. Advised pt to seek care at ER and to contact us for appt afterwards. Pt verbalized understanding.

## 2022-10-17 NOTE — ED PROVIDER NOTES
"Encounter Date: 10/17/2022       History     Chief Complaint   Patient presents with    Chest Pain    Shortness of Breath     57-year-old female with history of hypertension, CHF, COPD, AFib on blood thinners, presenting with chest pain and shortness of breath for two days.  Patient denies fever, cough, congestion.  Denies any leg swelling.  No other complaints.  Chest pain is nonexertional and nonradiating.    Review of patient's allergies indicates:   Allergen Reactions    Penicillins Hives    Iodinated contrast media Nausea And Vomiting    Oxycodone-acetaminophen Other (See Comments)     Nausea, Dizziness, Anxiety.  "I don't like how it makes me feel."   Given Hydromorphone 0.5mg IVP  Without problems.  Other reaction(s): Other (See Comments)    Diovan hct [valsartan-hydrochlorothiazide] Other (See Comments)     Causes coughing    Irinotecan      Pt has homozygosity for the TA7 promoter variant that places this individual at significantly increased risk for   severe neutropenia (grade 4) when treated with the standard dose of irinotecan (risk approximately 50%).   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 Metabolism of other drugs not listed here may also be impacted by UGT1A1 enzyme activity.       Tramadol Nausea And Vomiting     Other reaction(s): Other (See Comments)     Past Medical History:   Diagnosis Date    Anemia     Anticoagulant long-term use     Arthritis     Atrial fibrillation     CKD (chronic kidney disease), stage IV 5/8/2018    Congestive heart failure     COPD (chronic obstructive pulmonary disease)     Deep vein thrombosis     elevated bilirubin d/t Gilbert's syndrome     confirmed by [facility] genetic testing, evaluated by hepatology    Encounter for blood transfusion     GERD (gastroesophageal reflux disease)     Hypertension     Hypertensive cardiovascular-renal disease, " stage 1-4 or unspecified chronic kidney disease, with heart failure 3/4/2022    Pheochromocytoma, malignant     Restrictive lung disease 2022    Right kidney mass     Sleep apnea     Thalassemia trait, alpha     Thyroid disease     Type 2 diabetes mellitus with hyperglycemia, without long-term current use of insulin 2020     Past Surgical History:   Procedure Laterality Date    APPENDECTOMY      BONE MARROW BIOPSY      CARDIAC DEFIBRILLATOR PLACEMENT Left 2016    CARDIAC ELECTROPHYSIOLOGY MAPPING AND ABLATION      CARDIAC ELECTROPHYSIOLOGY MAPPING AND ABLATION      COLONOSCOPY N/A 2022    Procedure: COLONOSCOPY;  Surgeon: Arely Betancourt MD;  Location: [facility] ENDO (2ND FLR);  Service: Endoscopy;  Laterality: N/A;  heart transplant candidate/ EF 25% - 2nd floor/ defib - [facility] - ER[facility]  Eliquis - per Dr. Cortez with [facility], Pt ok to hold Eliquis x 2 days prior-see media tab-outside correspondence dated 21  - ER[facility]  verbal instructions/portal instructions/email instructions - s    EYE SURGERY      due to running tears    FRACTURE SURGERY Left     hand 5th digit    HYSTERECTOMY      KNEE SURGERY Left 2016    hematoma    LIVER BIOPSY  10/24/2018    Minimal steatosis, predominantly macrovesicular, 1%, Minimal nonspecific portal inflammation, no fibrosis. No findings on biopsy to explain elevated bilirubin levels. Could be d/t Gilbert's =?- hemolysis    RIGHT HEART CATHETERIZATION Right 2021    Procedure: INSERTION, CATHETER, RIGHT HEART;  Surgeon: Irving Cardenas MD;  Location: [facility] CATH LAB;  Service: Cardiology;  Laterality: Right;    TRANSJUGULAR BIOPSY OF LIVER N/A 10/24/2018    Procedure: BIOPSY, LIVER, TRANSJUGULAR APPROACH;  Surgeon: [facility] Diagnostic Provider;  [facility];  Service: Radiology;  Laterality: N/A;     Family History   Problem Relation Age of Onset    Cancer Mother         pancreatic CA early 50's    Heart disease Father          MI in late 50's     Hypertension Father     Heart attack Father     Heart disease Sister     Heart disease Brother     Cirrhosis Brother         alcoholic    Heart disease Sister     Heart disease Brother     Hypertension Brother     Diabetes Brother      Social History     Tobacco Use    Smoking status: Never    Smokeless tobacco: Never   Substance Use Topics    Alcohol use: Yes     Comment: up to 1 yr ago drank 2-3 drinks on occasion but sporadic    Drug use: No     Review of Systems   Constitutional:  Negative for fever.   HENT:  Negative for congestion and sore throat.    Respiratory:  Positive for shortness of breath.    Cardiovascular:  Positive for chest pain.   Gastrointestinal:  Negative for abdominal pain, diarrhea, nausea and vomiting.   Genitourinary:  Negative for dysuria.   Musculoskeletal:  Negative for back pain.   Skin:  Negative for rash.   Neurological:  Negative for weakness.   Hematological:  Does not bruise/bleed easily.     Physical Exam     Initial Vitals [10/17/22 1009]   BP Pulse Resp Temp SpO2   (!) 178/103 101 20 97.3 °F (36.3 °C) 96 %      MAP       --         Physical Exam    Nursing note and vitals reviewed.  Constitutional: She appears well-developed. She is not diaphoretic. No distress.   HENT:   Head: Normocephalic.   Eyes: Pupils are equal, round, and reactive to light.   Neck:   Normal range of motion.  Cardiovascular:            No murmur heard.  Pulmonary/Chest: No respiratory distress.   Clear lungs bilaterally.  No respiratory distress.  No wheezing or rales.  Good air movement.     Abdominal: Abdomen is soft. There is no abdominal tenderness.   Musculoskeletal:         General: No edema.      Cervical back: Normal range of motion.     Neurological: She is alert and oriented to person, place, and time.   Skin: Skin is warm.   Psychiatric: She has a normal mood and affect.       ED Course   Procedures  Labs Reviewed   CBC W/ AUTO DIFFERENTIAL - Abnormal; Notable for the following components:        Result Value    Hemoglobin 9.2 (*)     Hematocrit 32.2 (*)     MCV 60 (*)     MCH 17.2 (*)     MCHC 28.6 (*)     RDW 27.6 (*)     nRBC 1 (*)     All other components within normal limits   COMPREHENSIVE METABOLIC PANEL - Abnormal; Notable for the following components:    Glucose 153 (*)     BUN 35 (*)     Creatinine 1.6 (*)     Total Bilirubin 1.4 (*)     eGFR 37 (*)     All other components within normal limits   B-TYPE NATRIURETIC PEPTIDE - Abnormal; Notable for the following components:    BNP 1,557 (*)     All other components within normal limits   TROPONIN I - Abnormal; Notable for the following components:    Troponin I 1.138 (*)     All other components within normal limits   D DIMER, QUANTITATIVE     EKG Readings: (Independently Interpreted)   Initial Reading: No STEMI. Rhythm: Sinus Tachycardia. Heart Rate: 109. Ectopy: No Ectopy. Conduction: Normal.   ECG Results              EKG 12-lead (Final result)  Result time 10/17/22 11:39:16      Final result by Interface, Lab In [facility] (10/17/22 11:39:16)                   Narrative:    Test Reason : R07.9    Vent. Rate : 109 BPM     Atrial Rate : 109 BPM     P-R Int : 184 ms          QRS Dur : 110 ms      QT Int : 346 ms       P-R-T Axes : 067 -42 102 degrees     QTc Int : 465 ms    Sinus tachycardia  Left atrial enlargement  Left axis deviation  LVH with repolarization abnormality  Abnormal ECG  When compared with ECG of 26-APR-2022 13:17,  No significant change was found  Confirmed by PATRICIA JJ MD ([id number]) on 10/17/2022 11:39:09 AM    Referred By: [email or web address]   SELF           Confirmed By:PATRICIA JJ MD                                  Imaging Results              X-Ray Chest AP Portable (Final result)  Result time 10/17/22 10:38:06      Final result by Andrea Escalante Jr., MD (10/17/22 10:38:06)                   Impression:      1. Mild central vascular congestion changes without overt pulmonary edema the may be overestimated by the lack of  inspiration.  2. Indwelling pacemaker.      Electronically signed by: Andrea Escalante MD  Date:    10/17/2022  Time:    10:38               Narrative:    EXAMINATION:  XR CHEST AP PORTABLE    CLINICAL HISTORY:  shortness of breath;    TECHNIQUE:  Single frontal view of the chest was performed.    COMPARISON:  11/28/2021    FINDINGS:  Single lead pacemaker device projects over left upper chest wall distal leads deployed in the right atrium as expected.  The heart is prominent in size.  Mild central vascular congestion changes without overt pulmonary edema.  No evidence of pulmonic infiltrate or significant pleural effusion or atelectasis.  Tracheal lumen is midline without evidence of shift.  The osseous structures yield no significant finding.                                       Medications   ondansetron disintegrating tablet 4 mg (4 mg Oral Given 10/17/22 1037)   furosemide injection 40 mg (40 mg Intravenous Given 10/17/22 1116)   albuterol-ipratropium 2.5 mg-0.5 mg/3 mL nebulizer solution 3 mL (3 mLs Nebulization Given 10/17/22 1138)   predniSONE tablet 60 mg (60 mg Oral Given 10/17/22 1116)     Medical Decision Making:   Differential Diagnosis:   DDX: R/o ACS given history, risk factor analysis. R/o PNA/PTX.  Given history of DVT and presentation of chest pain or shortness of breath will screen with a D-dimer.  Low suspicion for COPD exacerbation given patient has no wheezing. Unlikely pericarditis/myocarditis given history, risk factor analysis, no rubs, no EKG findings consistent with this. Unlikely aortic pathology given history, risk factor analysis, but will screen with CXR.   DX: BMP, CBC, CXR, EKG. Serial troponin.  D-dimer  TX: Analgesia PRN. Treatment, consult as indicated by studies.   DISPO:  Pending workup               ED Course as of 10/26/22 0619   Mon Oct 17, 2022   1057 Troponin I(!): 1.138  Decreased from baseline [NB]   1111 On re-examination patient has very mild wheezing.  Will give  prednisone and breathing treatments while in the emergency room. [NB]   1226 Patient reports significant improvement in symptoms.  States she would like to be discharged.  No chest pain or shortness of breath at this time.  Will discharge [NB]      ED Course User Index  [NB] Efe Norris MD                 Clinical Impression:   Final diagnoses:  [R06.02] Shortness of breath        ED Disposition Condition    Discharge Stable          ED Prescriptions       Medication Sig Dispense Start Date End Date Auth. Provider    predniSONE (DELTASONE) 20 MG tablet () Take 2 tablets (40 mg total) by mouth once daily. for 5 days 10 tablet 10/17/2022 10/22/2022 Efe Norris MD    albuterol (ACCUNEB) 0.63 mg/3 mL Nebu Take 3 mLs (0.63 mg total) by nebulization every 6 (six) hours as needed. Rescue 75 mL 10/17/2022 10/17/2023 Efe Norris MD          Follow-up Information       Follow up With Specialties Details Why Contact Info    Cristobal Ann MD Family Medicine Schedule an appointment as soon as possible for a visit in 2 days  [address] [facility] [address]  [phone]      [facility] - Emergency Dept Emergency Medicine  If symptoms worsen [address] [facility] [address]  [phone]             Efe Norris MD  10/17/22 1021       Efe Norris MD  10/17/22 1025       Efe Norris MD  10/26/22 0619

## 2022-10-17 NOTE — TELEPHONE ENCOUNTER
----- Message from Frank Cook sent at 10/13/2022 11:53 AM CDT -----  Contact: Patient  Hafsa Hawley  MRN: [id number]  : 1965  PCP: Cristobal Ann  Home Phone      [phone]  Work Phone      Not on file.  Mobile          [phone]  Home Phone      [phone]      MESSAGE: bloody stools -- requesting to speak with nurse    Call [phone]    PCP: Seth

## 2022-10-19 ENCOUNTER — PATIENT OUTREACH (OUTPATIENT)
Dept: EMERGENCY MEDICINE | Facility: HOSPITAL | Age: 57
End: 2022-10-19
Payer: MEDICAID

## 2022-10-20 NOTE — PROGRESS NOTES
Beulah Vitale, Patient Care Assistant  ED Navigator  Emergency Department    [place]: [facility] [place]igator  Role: Community Health Worker    Date: 10/20/2022  Patient Name: Hafsa Hawley  MRN: [id number]  PCP: Cristobal Ann MD    Assessment:     Hafsa Hawley is a 57 y.o. female who has presented to ED for chest pains and shortness of breath. Patient has visited the ED 1 times in the past 3 months. Patient did contact PCP.     ED Navigator Initial Assessment    ED Navigator Enrollment Documentation  Consent to Services  Does patient consent to completing the assessment?: Yes  Contact  Method of Initial Contact: Phone  Transportation  Does the patient have issues with Transportation?: No  Does the patient have transportation to and from healthcare appointments?: Yes  Insurance Coverage  Do you have coverage/adequate coverage?: Yes  Type/kind of coverage: [facility]  Is patient able to afford co-pays/deductibles?: Yes  Is patient able to afford HME or supplies?: Yes  Does patient have an established [facility] PCP?: Yes  Able to access?: Yes  Does the patient have a lack of adequate coverage?: No  Specialist Appointment  Did the patient come to the ED to see a specialist?: No  Does the patient have a pending specialist referral?: Yes  Does the patient have a specialist appointment made?: No  PCP Follow Up Appointment  Has the patient had an appointment with a primary care provider in the past year?: Yes  Approximate date: 8/23/22  Provider: Cristobal Ann MD  Does the patient have a follow up appontment with a PCP?: Yes  Upcoming appointment date: 11/23/22  Provider: Cristobal Ann MD  When was the last time you saw your PCP?: 8/23/22  Medications  Is patient able to afford medication?: Yes  Is patient unable to get medication due to lack of transportation?: No  Psychological  Does the patient have psycho-social concerns?: No  Food  Does the patient have concerns about food?:  No  Communication/Education  Does the patient have limited English proficiency/English not primary language?: No  Does patient have low literacy and/or low health literacy?: Yes  Does patient have concerns with care?: No  Does patient have dissatisfaction with care?: No  Other Financial Concerns  Does the patient have immediate financial distress?: No  Does the patient have general financial concerns?: No  Other Social Barriers/Concerns  Does the patient have any additional barriers or concerns?: None  Primary Barrier  Barriers identified: Cognitive barrier (health literacy, language and communication, etc.)  Root Cause of ED Utilization: Chronic Conditions  Plan to address Chronic Conditions: Encourage patient to contact PCP/specialist for follow up per ED discharge instructions  Plan to address Patient Knowledge/Low Health Literacy: Provided information for [facility] On [facility] 24/7 Nurse triage line [phone] or [facility] ([phone])  Next steps: Provided Education  Was education/educational materials provided surrounding PCP services/creating a medical home?: Yes Was education verbal or written?: Written     Was education/educational materials provided surrounding low cost, healthy foods?: Yes Was education verbal or written?: Written     Was education/educational materials provided surrounding other items? If so, use comment to explain.: Yes Was education verbal or written?: Written   Plan: Provided information for [facility] 24/7 Nurse triage line, [phone] or [facility] ([phone])  Expected Date of Follow Up 1: 10/27/22  Additional Documentation: Patient stated her primary care is Dr. Ann, but inquired if ED navigator could help her find another PCP. Patient is interested in a list of PCPs in [place] sent to her, so she can look at her options. Patient additionally asked if she can be sent information on [employer], as she used to get them, and would love to get them again. Patient  has no other needs besides the ones above. Patient confirmed her e-mail address and is agreeable to a follow-up call next week. Patient is still feeling the same, and her voice has been in and out. Patient did not want help with any appointments today. Patient has been in touch with her cardiologist as well.    ED navigator explained to patient that Dr. Ann is the only PCP accepting new medicaid patients in the [facility], but she can send her a list of ones in the [facility]. ED navigator provided patient with the following at her e-mail address listed in epic: PCP's accepting new patients at [facility], the qualifications for being able to received [employer] and their contact information, the [facility] 24/7 Nurse triage line, [phone] or 1-866-[facility] ([phone]) contact information, and education on (The Right Care at the Right Level information, [facility] Virtual Visit information, and Heart healthy diet tips). ED navigator will follow-up with patient on/around 10/27/2022 to ensure she received the resources, to see if she would like assistance to schedule an appointment, and to assist as needed.    Beulah Vitale  ED Navigator- [place]          Social History     Socioeconomic History    Marital status:    Tobacco Use    Smoking status: Never    Smokeless tobacco: Never   Substance and Sexual Activity    Alcohol use: Yes     Comment: up to 1 yr ago drank 2-3 drinks on occasion but sporadic    Drug use: No    Sexual activity: Yes     Partners: Male   Social History Narrative    On disability since 2013     Social Determinants of Health     Financial Resource Strain: Low Risk     Difficulty of Paying Living Expenses: Not hard at all   Food Insecurity: No Food Insecurity    Worried About Running Out of Food in the Last Year: Never true    Ran Out of Food in the Last Year: Never true   Transportation Needs: No Transportation Needs    Lack of Transportation (Medical): No     Lack of Transportation (Non-Medical): No   Physical Activity: Inactive    Days of Exercise per Week: 0 days    Minutes of Exercise per Session: 0 min   Stress: Stress Concern Present    Feeling of Stress : Very much   Social Connections: Moderately Isolated    Frequency of Communication with Friends and Family: Once a week    Frequency of Social Gatherings with Friends and Family: Never    Attends Anabaptism Services: More than 4 times per year    Active Member of Clubs or Organizations: Yes    Attends Club or Organization Meetings: More than 4 times per year    Marital Status:    Housing Stability: Low Risk     Unable to Pay for Housing in the Last Year: No    Number of Places Lived in the Last Year: 1    Unstable Housing in the Last Year: No       Plan:       Patient stated her primary care is Dr. Ann, but inquired if ED navigator could help her find another PCP. Patient is interested in a list of PCPs in [place] sent to her, so she can look at her options. Patient additionally asked if she can be sent information on [employer], as she used to get them, and would love to get them again. Patient has no other needs besides the ones above. Patient confirmed her e-mail address and is agreeable to a follow-up call next week. Patient is still feeling the same, and her voice has been in and out. Patient did not want help with any appointments today. Patient has been in touch with her cardiologist as well.    ED navigator explained to patient that Dr. Ann is the only PCP accepting new medicaid patients in the [facility], but she can send her a list of ones in the [facility]. ED navigator provided patient with the following at her e-mail address listed in epic: PCP's accepting new patients at [facility], the qualifications for being able to received [employer] and their contact information, the [facility] On [facility] 24/7 Nurse triage line, [phone] or [facility] ([phone]) contact  information, and education on (The Right Care at the Right Level information, [facility] Virtual Visit information, and Heart healthy diet tips). ED navigator will follow-up with patient on/around 10/27/2022 to ensure she received the resources, to see if she would like assistance to schedule an appointment, and to assist as needed.    Beulah Vitale  ED Navigator- [place]

## 2022-10-26 ENCOUNTER — TELEPHONE (OUTPATIENT)
Dept: INTERNAL MEDICINE | Facility: CLINIC | Age: 57
End: 2022-10-26
Payer: MEDICAID

## 2022-10-26 NOTE — TELEPHONE ENCOUNTER
"Pt anxious on the phone, still with complaints of "chest tightness, SOB, swelling to throat area, not feeling well"     Pt was seen in ER on Monday previously. No improvement since, advised due to severity of complaints over the phone she will need to report to ER for evaluation. Informed I would notify PCP.   "

## 2022-10-27 ENCOUNTER — PATIENT OUTREACH (OUTPATIENT)
Dept: EMERGENCY MEDICINE | Facility: HOSPITAL | Age: 57
End: 2022-10-27
Payer: MEDICAID

## 2022-10-28 ENCOUNTER — OFFICE VISIT (OUTPATIENT)
Dept: FAMILY MEDICINE | Facility: CLINIC | Age: 57
DRG: 291 | End: 2022-10-28
Payer: MEDICAID

## 2022-10-28 ENCOUNTER — HOSPITAL ENCOUNTER (INPATIENT)
Facility: HOSPITAL | Age: 57
LOS: 6 days | Discharge: HOME | DRG: 291 | End: 2022-11-03
Attending: SURGERY | Admitting: FAMILY MEDICINE
Payer: MEDICAID

## 2022-10-28 ENCOUNTER — CLINICAL SUPPORT (OUTPATIENT)
Dept: CARDIOLOGY | Facility: HOSPITAL | Age: 57
End: 2022-10-28
Payer: MEDICAID

## 2022-10-28 ENCOUNTER — TELEPHONE (OUTPATIENT)
Dept: FAMILY MEDICINE | Facility: CLINIC | Age: 57
End: 2022-10-28

## 2022-10-28 DIAGNOSIS — I25.10 CARDIOVASCULAR DISEASE: ICD-10-CM

## 2022-10-28 DIAGNOSIS — R11.2 NAUSEA AND VOMITING, UNSPECIFIED VOMITING TYPE: ICD-10-CM

## 2022-10-28 DIAGNOSIS — I42.8 NICM (NONISCHEMIC CARDIOMYOPATHY): ICD-10-CM

## 2022-10-28 DIAGNOSIS — I50.42 CHRONIC COMBINED SYSTOLIC AND DIASTOLIC HEART FAILURE: ICD-10-CM

## 2022-10-28 DIAGNOSIS — I50.42 CHRONIC COMBINED SYSTOLIC (CONGESTIVE) AND DIASTOLIC (CONGESTIVE) HEART FAILURE: ICD-10-CM

## 2022-10-28 DIAGNOSIS — R06.02 SHORTNESS OF BREATH: ICD-10-CM

## 2022-10-28 DIAGNOSIS — I50.9 CONGESTIVE HEART FAILURE, UNSPECIFIED HF CHRONICITY, UNSPECIFIED HEART FAILURE TYPE: Primary | ICD-10-CM

## 2022-10-28 DIAGNOSIS — I50.43 ACUTE ON CHRONIC COMBINED SYSTOLIC AND DIASTOLIC CONGESTIVE HEART FAILURE: Primary | ICD-10-CM

## 2022-10-28 DIAGNOSIS — Z95.810 PRESENCE OF AUTOMATIC (IMPLANTABLE) CARDIAC DEFIBRILLATOR: ICD-10-CM

## 2022-10-28 DIAGNOSIS — I48.91 UNSPECIFIED ATRIAL FIBRILLATION: ICD-10-CM

## 2022-10-28 DIAGNOSIS — I42.0 DILATED CARDIOMYOPATHY: ICD-10-CM

## 2022-10-28 DIAGNOSIS — M1A.9XX0 CHRONIC GOUT WITHOUT TOPHUS, UNSPECIFIED CAUSE, UNSPECIFIED SITE: ICD-10-CM

## 2022-10-28 LAB
ALBUMIN SERPL BCP-MCNC: 3.5 G/DL (ref 3.5–5.2)
ALP SERPL-CCNC: 67 U/L (ref 55–135)
ALT SERPL W/O P-5'-P-CCNC: 34 U/L (ref 10–44)
ANION GAP SERPL CALC-SCNC: 12 MMOL/L (ref 8–16)
ANISOCYTOSIS BLD QL SMEAR: ABNORMAL
AST SERPL-CCNC: 25 U/L (ref 10–40)
BASOPHILS # BLD AUTO: 0.05 K/UL (ref 0–0.2)
BASOPHILS NFR BLD: 0.7 % (ref 0–1.9)
BILIRUB SERPL-MCNC: 1.4 MG/DL (ref 0.1–1)
BNP SERPL-MCNC: 2771 PG/ML (ref 0–99)
BUN SERPL-MCNC: 36 MG/DL (ref 6–20)
CALCIUM SERPL-MCNC: 9.1 MG/DL (ref 8.7–10.5)
CHLORIDE SERPL-SCNC: 108 MMOL/L (ref 95–110)
CK MB SERPL-MCNC: 3.6 NG/ML (ref 0.1–6.5)
CK MB SERPL-RTO: 8 % (ref 0–5)
CK SERPL-CCNC: 45 U/L (ref 20–180)
CK SERPL-CCNC: 45 U/L (ref 20–180)
CO2 SERPL-SCNC: 23 MMOL/L (ref 23–29)
CREAT SERPL-MCNC: 1.8 MG/DL (ref 0.5–1.4)
DACRYOCYTES BLD QL SMEAR: ABNORMAL
DIFFERENTIAL METHOD: ABNORMAL
EOSINOPHIL # BLD AUTO: 0.2 K/UL (ref 0–0.5)
EOSINOPHIL NFR BLD: 2.9 % (ref 0–8)
ERYTHROCYTE [DISTWIDTH] IN BLOOD BY AUTOMATED COUNT: 27.6 % (ref 11.5–14.5)
EST. GFR  (NO RACE VARIABLE): 32 ML/MIN/1.73 M^2
GLUCOSE SERPL-MCNC: 158 MG/DL (ref 70–110)
HCT VFR BLD AUTO: 30.3 % (ref 37–48.5)
HGB BLD-MCNC: 8.8 G/DL (ref 12–16)
HYPOCHROMIA BLD QL SMEAR: ABNORMAL
IMM GRANULOCYTES # BLD AUTO: 0.05 K/UL (ref 0–0.04)
IMM GRANULOCYTES NFR BLD AUTO: 0.7 % (ref 0–0.5)
INFLUENZA A, MOLECULAR: NEGATIVE
INFLUENZA B, MOLECULAR: NEGATIVE
LYMPHOCYTES # BLD AUTO: 1 K/UL (ref 1–4.8)
LYMPHOCYTES NFR BLD: 13.3 % (ref 18–48)
MCH RBC QN AUTO: 17.3 PG (ref 27–31)
MCHC RBC AUTO-ENTMCNC: 29 G/DL (ref 32–36)
MCV RBC AUTO: 60 FL (ref 82–98)
MONOCYTES # BLD AUTO: 0.6 K/UL (ref 0.3–1)
MONOCYTES NFR BLD: 7.8 % (ref 4–15)
NEUTROPHILS # BLD AUTO: 5.4 K/UL (ref 1.8–7.7)
NEUTROPHILS NFR BLD: 74.6 % (ref 38–73)
NRBC BLD-RTO: 1 /100 WBC
OVALOCYTES BLD QL SMEAR: ABNORMAL
PLATELET # BLD AUTO: 180 K/UL (ref 150–450)
PLATELET BLD QL SMEAR: ABNORMAL
PMV BLD AUTO: ABNORMAL FL (ref 9.2–12.9)
POCT GLUCOSE: 92 MG/DL (ref 70–110)
POIKILOCYTOSIS BLD QL SMEAR: ABNORMAL
POLYCHROMASIA BLD QL SMEAR: ABNORMAL
POTASSIUM SERPL-SCNC: 4.2 MMOL/L (ref 3.5–5.1)
PROT SERPL-MCNC: 6.8 G/DL (ref 6–8.4)
RBC # BLD AUTO: 5.08 M/UL (ref 4–5.4)
SARS-COV-2 RDRP RESP QL NAA+PROBE: NEGATIVE
SCHISTOCYTES BLD QL SMEAR: ABNORMAL
SODIUM SERPL-SCNC: 143 MMOL/L (ref 136–145)
SPECIMEN SOURCE: NORMAL
SPHEROCYTES BLD QL SMEAR: ABNORMAL
TARGETS BLD QL SMEAR: ABNORMAL
TROPONIN I SERPL DL<=0.01 NG/ML-MCNC: 1.06 NG/ML (ref 0–0.03)
WBC # BLD AUTO: 7.27 K/UL (ref 3.9–12.7)

## 2022-10-28 PROCEDURE — 93010 EKG 12-LEAD: ICD-10-PCS | Mod: ,,, | Performed by: INTERNAL MEDICINE

## 2022-10-28 PROCEDURE — G0378 HOSPITAL OBSERVATION PER HR: HCPCS

## 2022-10-28 PROCEDURE — 20000000 HC ICU ROOM

## 2022-10-28 PROCEDURE — 82553 CREATINE MB FRACTION: CPT | Performed by: SURGERY

## 2022-10-28 PROCEDURE — 63600175 PHARM REV CODE 636 W HCPCS: Performed by: SURGERY

## 2022-10-28 PROCEDURE — 93010 ELECTROCARDIOGRAM REPORT: CPT | Mod: ,,, | Performed by: INTERNAL MEDICINE

## 2022-10-28 PROCEDURE — 25000003 PHARM REV CODE 250: Performed by: FAMILY MEDICINE

## 2022-10-28 PROCEDURE — 94761 N-INVAS EAR/PLS OXIMETRY MLT: CPT

## 2022-10-28 PROCEDURE — 96372 THER/PROPH/DIAG INJ SC/IM: CPT | Mod: PBBFAC

## 2022-10-28 PROCEDURE — U0002 COVID-19 LAB TEST NON-CDC: HCPCS | Performed by: SURGERY

## 2022-10-28 PROCEDURE — 3044F HG A1C LEVEL LT 7.0%: CPT | Mod: CPTII,,, | Performed by: STUDENT IN AN ORGANIZED HEALTH CARE EDUCATION/TRAINING PROGRAM

## 2022-10-28 PROCEDURE — 99220 PR INITIAL OBSERVATION CARE,LEVL III: CPT | Mod: ,,, | Performed by: FAMILY MEDICINE

## 2022-10-28 PROCEDURE — 84484 ASSAY OF TROPONIN QUANT: CPT | Performed by: SURGERY

## 2022-10-28 PROCEDURE — 36415 COLL VENOUS BLD VENIPUNCTURE: CPT | Performed by: SURGERY

## 2022-10-28 PROCEDURE — 80053 COMPREHEN METABOLIC PANEL: CPT | Performed by: SURGERY

## 2022-10-28 PROCEDURE — 3044F PR MOST RECENT HEMOGLOBIN A1C LEVEL <7.0%: ICD-10-PCS | Mod: CPTII,,, | Performed by: STUDENT IN AN ORGANIZED HEALTH CARE EDUCATION/TRAINING PROGRAM

## 2022-10-28 PROCEDURE — 87502 INFLUENZA DNA AMP PROBE: CPT | Performed by: SURGERY

## 2022-10-28 PROCEDURE — 99285 EMERGENCY DEPT VISIT HI MDM: CPT | Mod: 25

## 2022-10-28 PROCEDURE — 25000003 PHARM REV CODE 250: Performed by: SURGERY

## 2022-10-28 PROCEDURE — 3066F NEPHROPATHY DOC TX: CPT | Mod: CPTII,,, | Performed by: STUDENT IN AN ORGANIZED HEALTH CARE EDUCATION/TRAINING PROGRAM

## 2022-10-28 PROCEDURE — 93296 REM INTERROG EVL PM/IDS: CPT | Performed by: INTERNAL MEDICINE

## 2022-10-28 PROCEDURE — 99214 PR OFFICE/OUTPT VISIT, EST, LEVL IV, 30-39 MIN: ICD-10-PCS | Mod: S$PBB,,, | Performed by: STUDENT IN AN ORGANIZED HEALTH CARE EDUCATION/TRAINING PROGRAM

## 2022-10-28 PROCEDURE — 3066F PR DOCUMENTATION OF TREATMENT FOR NEPHROPATHY: ICD-10-PCS | Mod: CPTII,,, | Performed by: STUDENT IN AN ORGANIZED HEALTH CARE EDUCATION/TRAINING PROGRAM

## 2022-10-28 PROCEDURE — 99214 OFFICE O/P EST MOD 30 MIN: CPT | Mod: S$PBB,,, | Performed by: STUDENT IN AN ORGANIZED HEALTH CARE EDUCATION/TRAINING PROGRAM

## 2022-10-28 PROCEDURE — 85025 COMPLETE CBC W/AUTO DIFF WBC: CPT | Performed by: SURGERY

## 2022-10-28 PROCEDURE — 4010F PR ACE/ARB THEARPY RXD/TAKEN: ICD-10-PCS | Mod: CPTII,,, | Performed by: STUDENT IN AN ORGANIZED HEALTH CARE EDUCATION/TRAINING PROGRAM

## 2022-10-28 PROCEDURE — 93005 ELECTROCARDIOGRAM TRACING: CPT

## 2022-10-28 PROCEDURE — 99220 PR INITIAL OBSERVATION CARE,LEVL III: ICD-10-PCS | Mod: ,,, | Performed by: FAMILY MEDICINE

## 2022-10-28 PROCEDURE — 4010F ACE/ARB THERAPY RXD/TAKEN: CPT | Mod: CPTII,,, | Performed by: STUDENT IN AN ORGANIZED HEALTH CARE EDUCATION/TRAINING PROGRAM

## 2022-10-28 PROCEDURE — 83880 ASSAY OF NATRIURETIC PEPTIDE: CPT | Performed by: SURGERY

## 2022-10-28 RX ORDER — ROPINIROLE 1 MG/1
4 TABLET, FILM COATED ORAL DAILY
Status: DISCONTINUED | OUTPATIENT
Start: 2022-10-29 | End: 2022-10-29

## 2022-10-28 RX ORDER — INSULIN ASPART 100 [IU]/ML
0-5 INJECTION, SOLUTION INTRAVENOUS; SUBCUTANEOUS
Status: DISCONTINUED | OUTPATIENT
Start: 2022-10-28 | End: 2022-11-03 | Stop reason: HOSPADM

## 2022-10-28 RX ORDER — IBUPROFEN 200 MG
24 TABLET ORAL
Status: DISCONTINUED | OUTPATIENT
Start: 2022-10-28 | End: 2022-11-03 | Stop reason: HOSPADM

## 2022-10-28 RX ORDER — LANOLIN ALCOHOL/MO/W.PET/CERES
1 CREAM (GRAM) TOPICAL DAILY
Status: DISCONTINUED | OUTPATIENT
Start: 2022-10-29 | End: 2022-11-03 | Stop reason: HOSPADM

## 2022-10-28 RX ORDER — ATORVASTATIN CALCIUM 40 MG/1
40 TABLET, FILM COATED ORAL NIGHTLY
Status: DISCONTINUED | OUTPATIENT
Start: 2022-10-28 | End: 2022-11-03 | Stop reason: HOSPADM

## 2022-10-28 RX ORDER — PANTOPRAZOLE SODIUM 40 MG/1
40 TABLET, DELAYED RELEASE ORAL DAILY
Status: DISCONTINUED | OUTPATIENT
Start: 2022-10-29 | End: 2022-11-03 | Stop reason: HOSPADM

## 2022-10-28 RX ORDER — ASPIRIN 325 MG
325 TABLET ORAL
Status: COMPLETED | OUTPATIENT
Start: 2022-10-28 | End: 2022-10-28

## 2022-10-28 RX ORDER — TALC
6 POWDER (GRAM) TOPICAL NIGHTLY PRN
Status: DISCONTINUED | OUTPATIENT
Start: 2022-10-28 | End: 2022-11-03 | Stop reason: HOSPADM

## 2022-10-28 RX ORDER — ONDANSETRON 2 MG/ML
4 INJECTION INTRAMUSCULAR; INTRAVENOUS
Status: DISCONTINUED | OUTPATIENT
Start: 2022-10-28 | End: 2022-10-28

## 2022-10-28 RX ORDER — GLUCAGON 1 MG
1 KIT INJECTION
Status: DISCONTINUED | OUTPATIENT
Start: 2022-10-28 | End: 2022-11-03 | Stop reason: HOSPADM

## 2022-10-28 RX ORDER — IBUPROFEN 200 MG
16 TABLET ORAL
Status: DISCONTINUED | OUTPATIENT
Start: 2022-10-28 | End: 2022-11-03 | Stop reason: HOSPADM

## 2022-10-28 RX ORDER — SODIUM CHLORIDE 0.9 % (FLUSH) 0.9 %
10 SYRINGE (ML) INJECTION
Status: DISCONTINUED | OUTPATIENT
Start: 2022-10-28 | End: 2022-11-03 | Stop reason: HOSPADM

## 2022-10-28 RX ORDER — METOPROLOL SUCCINATE 50 MG/1
100 TABLET, EXTENDED RELEASE ORAL DAILY
Status: DISCONTINUED | OUTPATIENT
Start: 2022-10-29 | End: 2022-11-01

## 2022-10-28 RX ORDER — DULOXETIN HYDROCHLORIDE 30 MG/1
60 CAPSULE, DELAYED RELEASE ORAL DAILY
Status: DISCONTINUED | OUTPATIENT
Start: 2022-10-29 | End: 2022-10-29

## 2022-10-28 RX ORDER — ONDANSETRON 2 MG/ML
4 INJECTION INTRAMUSCULAR; INTRAVENOUS EVERY 8 HOURS PRN
Status: DISCONTINUED | OUTPATIENT
Start: 2022-10-28 | End: 2022-10-30

## 2022-10-28 RX ORDER — INSULIN ASPART 100 [IU]/ML
7 INJECTION, SOLUTION INTRAVENOUS; SUBCUTANEOUS
Status: DISCONTINUED | OUTPATIENT
Start: 2022-10-29 | End: 2022-11-03 | Stop reason: HOSPADM

## 2022-10-28 RX ORDER — FUROSEMIDE 10 MG/ML
40 INJECTION INTRAMUSCULAR; INTRAVENOUS
Status: DISCONTINUED | OUTPATIENT
Start: 2022-10-28 | End: 2022-10-30

## 2022-10-28 RX ORDER — LEVALBUTEROL 1.25 MG/.5ML
1.25 SOLUTION, CONCENTRATE RESPIRATORY (INHALATION) EVERY 6 HOURS PRN
Status: DISCONTINUED | OUTPATIENT
Start: 2022-10-28 | End: 2022-10-29

## 2022-10-28 RX ADMIN — ASPIRIN 325 MG ORAL TABLET 325 MG: 325 PILL ORAL at 02:10

## 2022-10-28 RX ADMIN — FUROSEMIDE 40 MG: 10 INJECTION, SOLUTION INTRAMUSCULAR; INTRAVENOUS at 03:10

## 2022-10-28 RX ADMIN — ONDANSETRON 4 MG: 2 INJECTION INTRAMUSCULAR; INTRAVENOUS at 01:10

## 2022-10-28 RX ADMIN — APIXABAN 2.5 MG: 2.5 TABLET, FILM COATED ORAL at 09:10

## 2022-10-28 RX ADMIN — ATORVASTATIN CALCIUM 40 MG: 20 TABLET, FILM COATED ORAL at 09:10

## 2022-10-28 NOTE — PROGRESS NOTES
Subjective:       Patient ID: Hafsa Hawley is a 57 y.o. female.    Chief Complaint: No chief complaint on file.    Pt here for ER follow-up. She was seen in ER last night for chest pain and SOB. It was thought that she had a mild heart failure exacerbation. She was given IV diuretics with good output and trop was at baseline and her oxygen sats were in the mid 90s so she was discharged home with close follow-up today in clinic.     Pt reports her LAGUERRE has worsened again overnight and this morning. She tried taking an extra lasix last night and again this morning with no significant improvement. She also has been nauseated with minimal emesis. She has chest rightness as well. No fever/chills.     Review of Systems   Constitutional:  Positive for activity change and fatigue.   HENT:  Negative for sore throat.    Respiratory:  Positive for cough, chest tightness and shortness of breath.    Gastrointestinal:  Positive for nausea and vomiting.   Genitourinary:  Negative for dysuria and hematuria.   Skin:  Negative for rash.   Neurological:  Negative for dizziness, syncope and light-headedness.   Psychiatric/Behavioral:  Negative for confusion. The patient is nervous/anxious.      Objective:      Physical Exam   Constitutional: She appears ill. She appears distressed.   HENT:   Head: Normocephalic and atraumatic.   Eyes: Conjunctivae are normal.   Cardiovascular: Bradycardia present.   No murmur heard.  Pulmonary/Chest: She is in respiratory distress (mild tachypnea). She has no wheezes.   Abdominal: Soft. Bowel sounds are normal. She exhibits no distension. There is no abdominal tenderness.   Musculoskeletal:      Cervical back: Neck supple.   Neurological: She is alert.   Psychiatric:   Depressed mood   Vitals reviewed.    Assessment:       1. Acute on chronic combined systolic and diastolic congestive heart failure    2. Nausea and vomiting, unspecified vomiting type          Plan:           1. Acute on chronic  combined systolic and diastolic congestive heart failure    2. Nausea and vomiting, unspecified vomiting type  -     ondansetron injection 4 mg     Zofran IM given in clinic for N/V  Oxygen sat 90% at rest with desat on ambulation  Large left pleural effusion on xray from ER  Pt with clinical CHF exacerbation. Called ER to discuss and will send patient for further evaluation and likely admission

## 2022-10-28 NOTE — ED NOTES
Patient resting comfortably in ED stretcher. Bed locked in lowest position. AAO x 3. Respirations even and unlabored at this time. Call bell within reach. Patient instructed to call with any needs or concerns, verbalized understanding. Will continue to monitor.

## 2022-10-28 NOTE — TELEPHONE ENCOUNTER
----- Message from Myla Calvert sent at 10/21/2022 12:08 PM CDT -----  Contact: Self  Hafsa Hawley  MRN: [id number]  : 1965  PCP: Cristobal Ann  Home Phone      [phone]  Work Phone      Not on file.  Mobile          [phone]  Home Phone      [phone]      MESSAGE:     Pt called concerned about there blood pressure she states its 90/60. Pt also states she has been overly tired lately.--Please advise      [phone]

## 2022-10-28 NOTE — ED PROVIDER NOTES
"Encounter Date: 10/28/2022       History     Chief Complaint   Patient presents with    Shortness of Breath     Patient to ER CC of SOB, chest pain started a few days ago getting worse      57-year-old female presents with shortness of breath in the emergency room today  Patient was seen yesterday in the emergency room with shortness of breath/CHF  Patient was seen & evaluated by the ER physician as well as PCP Seth as well  Discharged home with close follow-up in clinic today by Dr. Ann for re-evaluation  Patient presented to Dr. Ann today markedly short of breath with CHF issues  Patient can not ambulate without a desaturation into the mid 80s per report given  Dr. Bacon called the emergency room, he would like the pt admitted & diuresed    Review of patient's allergies indicates:   Allergen Reactions    Penicillins Hives    Iodinated contrast media Nausea And Vomiting    Oxycodone-acetaminophen Other (See Comments)     Nausea, Dizziness, Anxiety.  "I don't like how it makes me feel."   Given Hydromorphone 0.5mg IVP  Without problems.  Other reaction(s): Other (See Comments)    Diovan hct [valsartan-hydrochlorothiazide] Other (See Comments)     Causes coughing    Irinotecan      Pt has homozygosity for the TA7 promoter variant that places this individual at significantly increased risk for   severe neutropenia (grade 4) when treated with the standard dose of irinotecan (risk approximately 50%).   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 Metabolism of other drugs not listed here may also be impacted by UGT1A1 enzyme activity.       Tramadol Nausea And Vomiting     Other reaction(s): Other (See Comments)     Past Medical History:   Diagnosis Date    Anemia     Anticoagulant long-term use     Arthritis     Atrial fibrillation     CKD (chronic kidney disease), stage IV 5/8/2018    Congestive " heart failure     COPD (chronic obstructive pulmonary disease)     Deep vein thrombosis     elevated bilirubin d/t Gilbert's syndrome     confirmed by [place] genetic testing, evaluated by hepatology    Encounter for blood transfusion     GERD (gastroesophageal reflux disease)     Hypertension     Hypertensive cardiovascular-renal disease, stage 1-4 or unspecified chronic kidney disease, with heart failure 3/4/2022    Pheochromocytoma, malignant     Restrictive lung disease 4/26/2022    Right kidney mass     Sleep apnea     Thalassemia trait, alpha     Thyroid disease     Type 2 diabetes mellitus with hyperglycemia, without long-term current use of insulin 8/13/2020     Past Surgical History:   Procedure Laterality Date    APPENDECTOMY      BONE MARROW BIOPSY      CARDIAC DEFIBRILLATOR PLACEMENT Left 12/2016    CARDIAC ELECTROPHYSIOLOGY MAPPING AND ABLATION      CARDIAC ELECTROPHYSIOLOGY MAPPING AND ABLATION      COLONOSCOPY N/A 5/6/2022    Procedure: COLONOSCOPY;  Surgeon: Arely Betancourt MD;  Location: [facility] ENDO ([address] [place] [address]);  Service: Endoscopy;  Laterality: N/A;  heart transplant candidate/ EF 25% - 2nd floor/ defib - Biotronik - ERW  Eliquis - per Dr. Cortez with [facility], Pt ok to hold Eliquis x 2 days prior-see media tab-outside correspondence dated 12/30/21  - ERW  verbal instructions/portal instructions/email instructions - s    EYE SURGERY      due to running tears    FRACTURE SURGERY Left     hand 5th digit    HYSTERECTOMY      KNEE SURGERY Left 2016    hematoma    LIVER BIOPSY  10/24/2018    Minimal steatosis, predominantly macrovesicular, 1%, Minimal nonspecific portal inflammation, no fibrosis. No findings on biopsy to explain elevated bilirubin levels. Could be d/t Gilbert's =?- hemolysis    RIGHT HEART CATHETERIZATION Right 12/07/2021    Procedure: INSERTION, CATHETER, RIGHT HEART;  Surgeon: Irving Cardenas MD;  Location: [facility] CATH LAB;  Service: Cardiology;  Laterality: Right;    TRANSJUGULAR BIOPSY OF  LIVER N/A 10/24/2018    Procedure: BIOPSY, LIVER, TRANSJUGULAR APPROACH;  Surgeon: Carmen Diagnostic Provider;  Location: [facility] OR [address] [place] [address];  Service: Radiology;  Laterality: N/A;     Family History   Problem Relation Age of Onset    Cancer Mother         pancreatic CA early 50's    Heart disease Father          MI in late 50's    Hypertension Father     Heart attack Father     Heart disease Sister     Heart disease Brother     Cirrhosis Brother         alcoholic    Heart disease Sister     Heart disease Brother     Hypertension Brother     Diabetes Brother      Social History     Tobacco Use    Smoking status: Never    Smokeless tobacco: Never   Substance Use Topics    Alcohol use: Yes     Comment: up to 1 yr ago drank 2-3 drinks on occasion but sporadic    Drug use: No     Review of Systems   Constitutional: Negative.    HENT: Negative.     Eyes: Negative.    Respiratory:  Positive for shortness of breath.    Cardiovascular: Negative.    Gastrointestinal: Negative.    Genitourinary: Negative.    Musculoskeletal: Negative.    Skin: Negative.    Neurological: Negative.    Psychiatric/Behavioral: Negative.       Physical Exam     Initial Vitals [10/28/22 1413]   BP Pulse Resp Temp SpO2   (!) 163/92 106 20 (!) 95.8 °F (35.4 °C) (!) 93 %      MAP       --         Physical Exam    Nursing note and vitals reviewed.  Constitutional: Vital signs are normal. She appears well-developed and well-nourished. She is cooperative.   HENT:   Head: Normocephalic and atraumatic.   Right Ear: External ear normal.   Left Ear: External ear normal.   Nose: Nose normal.   Mouth/Throat: Oropharynx is clear and moist.   Eyes: Conjunctivae, EOM and lids are normal. Pupils are equal, round, and reactive to light.   Neck: Trachea normal and phonation normal. Neck supple. No JVD present.   Normal range of motion.   Full passive range of motion without pain.     Cardiovascular:  Normal rate, regular rhythm, S1 normal, S2 normal, normal heart  sounds, intact distal pulses and normal pulses.           Pulmonary/Chest: Effort normal.   (+) crackles in the bilateral bases with diminished air exchange   Abdominal: Abdomen is soft and flat. Bowel sounds are normal.   Musculoskeletal:         General: Normal range of motion.      Cervical back: Full passive range of motion without pain, normal range of motion and neck supple.     Neurological: She is alert and oriented to person, place, and time. She has normal strength.   Skin: Skin is warm, dry and intact. Capillary refill takes less than 2 seconds.       ED Course   Procedures  Labs Reviewed   CBC W/ AUTO DIFFERENTIAL - Abnormal; Notable for the following components:       Result Value    Hemoglobin 8.8 (*)     Hematocrit 30.3 (*)     MCV 60 (*)     MCH 17.3 (*)     MCHC 29.0 (*)     RDW 27.6 (*)     Immature Granulocytes 0.7 (*)     Immature Grans (Abs) 0.05 (*)     nRBC 1 (*)     Gran % 74.6 (*)     Lymph % 13.3 (*)     All other components within normal limits   COMPREHENSIVE METABOLIC PANEL - Abnormal; Notable for the following components:    Glucose 158 (*)     BUN 36 (*)     Creatinine 1.8 (*)     Total Bilirubin 1.4 (*)     eGFR 32 (*)     All other components within normal limits   TROPONIN I - Abnormal; Notable for the following components:    Troponin I 1.061 (*)     All other components within normal limits   CK-MB - Abnormal; Notable for the following components:    MB % 8.0 (*)     All other components within normal limits   B-TYPE NATRIURETIC PEPTIDE - Abnormal; Notable for the following components:    BNP 2,771 (*)     All other components within normal limits   INFLUENZA A & B BY MOLECULAR   SARS-COV-2 RNA AMPLIFICATION, QUAL   CK     EKG Readings: (Independently Interpreted)   No STEMI  Normal sinus rhythm  No ectopy  Normal conduction  Normal ST segments  Normal T-wave  Normal axis  Heart rate in the 90s   ECG Results              EKG 12-lead (Final result)  Result time 10/28/22 15:09:26       Final result by Interface, Lab In [facility] (10/28/22 15:09:26)                   Narrative:    Test Reason : R06.02,    Vent. Rate : 096 BPM     Atrial Rate : 096 BPM     P-R Int : 184 ms          QRS Dur : 110 ms      QT Int : 398 ms       P-R-T Axes : 076 -56 092 degrees     QTc Int : 502 ms    Sinus rhythm with frequent Premature ventricular complexes  Left atrial enlargement  Left anterior fascicular block  Nonspecific ST and/or T wave abnormalities  Prolonged QT  Abnormal ECG  When compared with ECG of 27-OCT-2022 16:37,  Premature ventricular complexes are now Present  Confirmed by Parviz Dewey MD ([id number]) on 10/28/2022 3:09:17 PM    Referred By: [email or web address]   SELF           Confirmed By:Parviz Dewey MD                                  Imaging Results              X-Ray Chest 1 View (Final result)  Result time 10/28/22 15:23:06      Final result by Sophia Green MD (10/28/22 15:23:06)                   Impression:      Opacification of left lower chest suggests atelectasis versus consolidated pneumonia with possible effusion      Electronically signed by: Sophia Green MD  Date:    10/28/2022  Time:    15:23               Narrative:    EXAMINATION:  XR CHEST 1 VIEW    CLINICAL HISTORY:  CP;    COMPARISON:  10/27/2022    FINDINGS:  Similar opacification of left lower chest with obscuration of the diaphragm.    Right lung and upper left lung are clear.  Enlarged cardiac silhouette, left ICD.                                       Medications   furosemide injection 40 mg (40 mg Intravenous Given 10/28/22 1535)   aspirin tablet 325 mg (325 mg Oral Given 10/28/22 1428)     Medical Decision Making:   Initial Assessment:   Shortness of breath with longstanding issues with congestive heart failure  Seen in the ER yesterday & medically managed with follow-up earlier today  Dr. Ann sent the patient back to the ER for admission & IV Lasix diuresis    Differential Diagnosis:   CHF, PE, COPD, emphysema going to  SOB not otherwise specified    Clinical Tests:   Lab Tests: Ordered and Reviewed  Radiological Study: Ordered and Reviewed  Medical Tests: Ordered and Reviewed    ED Management:  This patient presented short of breath with hypoxia on ER triage   On 2 liters the patient is 94%, given IV Lasix in the ER today  BNP is 2700 which is increased from 2200 yesterday afternoon  Has a stable troponin, breathing treatments, IV Lasix on admission               Critical Care ED Physician Time (minutes):  -- Performed by: Adriano Low M.D.  -- Date/Time: 3:55 PM 10/28/2022   -- Direct Patient Care (Face Time): 5  -- Additional History from Records or Taking Additional History: 5  -- Ordering, Reviewing, and Interpreting Diagnostic Studies: 5  -- Total Time in Documentation: 11  -- Consultation with Other Physicians: 5  -- Consultation with Family Related to Condition: 0  -- Total Critical Care Time: 31  -- Critical care was necessary to treat CHF/hypoxia  -- Critical care was time spent personally by me on the following activities:   -- development of treatment plan with patient or surrogate,   -- examination of patient, ordering and performing treatments   -- review of radiographic studies, re-evaluation of pt's condition  -- review of labs and evaluation of response to treatment        Clinical Impression:   Final diagnoses:  [R06.02] Shortness of breath  [I50.9] Congestive heart failure, unspecified HF chronicity, unspecified heart failure type (Primary)        ED Disposition Condition    Observation Stable                Adriano Low MD  10/28/22 8056

## 2022-10-29 LAB
ALBUMIN SERPL BCP-MCNC: 3.5 G/DL (ref 3.5–5.2)
ALP SERPL-CCNC: 62 U/L (ref 55–135)
ALT SERPL W/O P-5'-P-CCNC: 30 U/L (ref 10–44)
ANION GAP SERPL CALC-SCNC: 14 MMOL/L (ref 8–16)
ANISOCYTOSIS BLD QL SMEAR: ABNORMAL
AST SERPL-CCNC: 21 U/L (ref 10–40)
BASOPHILS # BLD AUTO: 0.03 K/UL (ref 0–0.2)
BASOPHILS NFR BLD: 0.4 % (ref 0–1.9)
BILIRUB SERPL-MCNC: 1.8 MG/DL (ref 0.1–1)
BUN SERPL-MCNC: 36 MG/DL (ref 6–20)
CALCIUM SERPL-MCNC: 9.1 MG/DL (ref 8.7–10.5)
CHLORIDE SERPL-SCNC: 107 MMOL/L (ref 95–110)
CO2 SERPL-SCNC: 23 MMOL/L (ref 23–29)
CREAT SERPL-MCNC: 1.9 MG/DL (ref 0.5–1.4)
DACRYOCYTES BLD QL SMEAR: ABNORMAL
DIFFERENTIAL METHOD: ABNORMAL
EOSINOPHIL # BLD AUTO: 0.2 K/UL (ref 0–0.5)
EOSINOPHIL NFR BLD: 3.3 % (ref 0–8)
ERYTHROCYTE [DISTWIDTH] IN BLOOD BY AUTOMATED COUNT: 27 % (ref 11.5–14.5)
EST. GFR  (NO RACE VARIABLE): 30 ML/MIN/1.73 M^2
GLUCOSE SERPL-MCNC: 88 MG/DL (ref 70–110)
HCT VFR BLD AUTO: 29.1 % (ref 37–48.5)
HGB BLD-MCNC: 8.6 G/DL (ref 12–16)
HYPOCHROMIA BLD QL SMEAR: ABNORMAL
IMM GRANULOCYTES # BLD AUTO: 0.05 K/UL (ref 0–0.04)
IMM GRANULOCYTES NFR BLD AUTO: 0.7 % (ref 0–0.5)
LYMPHOCYTES # BLD AUTO: 1.3 K/UL (ref 1–4.8)
LYMPHOCYTES NFR BLD: 18.3 % (ref 18–48)
MAGNESIUM SERPL-MCNC: 1.5 MG/DL (ref 1.6–2.6)
MCH RBC QN AUTO: 17.4 PG (ref 27–31)
MCHC RBC AUTO-ENTMCNC: 29.6 G/DL (ref 32–36)
MCV RBC AUTO: 59 FL (ref 82–98)
MONOCYTES # BLD AUTO: 0.6 K/UL (ref 0.3–1)
MONOCYTES NFR BLD: 8.6 % (ref 4–15)
NEUTROPHILS # BLD AUTO: 4.8 K/UL (ref 1.8–7.7)
NEUTROPHILS NFR BLD: 68.7 % (ref 38–73)
NRBC BLD-RTO: 1 /100 WBC
OVALOCYTES BLD QL SMEAR: ABNORMAL
PLATELET # BLD AUTO: 174 K/UL (ref 150–450)
PLATELET BLD QL SMEAR: ABNORMAL
PMV BLD AUTO: ABNORMAL FL (ref 9.2–12.9)
POCT GLUCOSE: 108 MG/DL (ref 70–110)
POCT GLUCOSE: 150 MG/DL (ref 70–110)
POCT GLUCOSE: 93 MG/DL (ref 70–110)
POIKILOCYTOSIS BLD QL SMEAR: ABNORMAL
POLYCHROMASIA BLD QL SMEAR: ABNORMAL
POTASSIUM SERPL-SCNC: 3.8 MMOL/L (ref 3.5–5.1)
PROT SERPL-MCNC: 6.6 G/DL (ref 6–8.4)
RBC # BLD AUTO: 4.93 M/UL (ref 4–5.4)
SCHISTOCYTES BLD QL SMEAR: PRESENT
SODIUM SERPL-SCNC: 144 MMOL/L (ref 136–145)
SPHEROCYTES BLD QL SMEAR: ABNORMAL
TARGETS BLD QL SMEAR: ABNORMAL
TROPONIN I SERPL DL<=0.01 NG/ML-MCNC: 1.03 NG/ML (ref 0–0.03)
TROPONIN I SERPL DL<=0.01 NG/ML-MCNC: 1.05 NG/ML (ref 0–0.03)
TROPONIN I SERPL DL<=0.01 NG/ML-MCNC: 1.1 NG/ML (ref 0–0.03)
WBC # BLD AUTO: 6.94 K/UL (ref 3.9–12.7)

## 2022-10-29 PROCEDURE — 80053 COMPREHEN METABOLIC PANEL: CPT | Performed by: SURGERY

## 2022-10-29 PROCEDURE — 99232 SBSQ HOSP IP/OBS MODERATE 35: CPT | Mod: ,,, | Performed by: FAMILY MEDICINE

## 2022-10-29 PROCEDURE — 25000242 PHARM REV CODE 250 ALT 637 W/ HCPCS: Performed by: FAMILY MEDICINE

## 2022-10-29 PROCEDURE — 27000221 HC OXYGEN, UP TO 24 HOURS

## 2022-10-29 PROCEDURE — 25000003 PHARM REV CODE 250: Performed by: FAMILY MEDICINE

## 2022-10-29 PROCEDURE — 36415 COLL VENOUS BLD VENIPUNCTURE: CPT | Performed by: FAMILY MEDICINE

## 2022-10-29 PROCEDURE — 99232 PR SUBSEQUENT HOSPITAL CARE,LEVL II: ICD-10-PCS | Mod: ,,, | Performed by: FAMILY MEDICINE

## 2022-10-29 PROCEDURE — 93010 ELECTROCARDIOGRAM REPORT: CPT | Mod: ,,, | Performed by: INTERNAL MEDICINE

## 2022-10-29 PROCEDURE — 83735 ASSAY OF MAGNESIUM: CPT | Performed by: FAMILY MEDICINE

## 2022-10-29 PROCEDURE — 20000000 HC ICU ROOM

## 2022-10-29 PROCEDURE — 93005 ELECTROCARDIOGRAM TRACING: CPT

## 2022-10-29 PROCEDURE — 85025 COMPLETE CBC W/AUTO DIFF WBC: CPT | Performed by: SURGERY

## 2022-10-29 PROCEDURE — 93010 EKG 12-LEAD: ICD-10-PCS | Mod: ,,, | Performed by: INTERNAL MEDICINE

## 2022-10-29 PROCEDURE — 94761 N-INVAS EAR/PLS OXIMETRY MLT: CPT

## 2022-10-29 PROCEDURE — 99900035 HC TECH TIME PER 15 MIN (STAT)

## 2022-10-29 PROCEDURE — 94640 AIRWAY INHALATION TREATMENT: CPT

## 2022-10-29 PROCEDURE — 63600175 PHARM REV CODE 636 W HCPCS: Performed by: SURGERY

## 2022-10-29 PROCEDURE — 84484 ASSAY OF TROPONIN QUANT: CPT | Performed by: FAMILY MEDICINE

## 2022-10-29 RX ORDER — MUPIROCIN 20 MG/G
OINTMENT TOPICAL 2 TIMES DAILY
Status: DISPENSED | OUTPATIENT
Start: 2022-10-29 | End: 2022-11-03

## 2022-10-29 RX ORDER — LEVALBUTEROL 1.25 MG/.5ML
1.25 SOLUTION, CONCENTRATE RESPIRATORY (INHALATION) EVERY 6 HOURS PRN
Status: DISCONTINUED | OUTPATIENT
Start: 2022-10-29 | End: 2022-11-03 | Stop reason: HOSPADM

## 2022-10-29 RX ORDER — ROPINIROLE 1 MG/1
4 TABLET, FILM COATED ORAL NIGHTLY
Status: DISCONTINUED | OUTPATIENT
Start: 2022-10-29 | End: 2022-11-03 | Stop reason: HOSPADM

## 2022-10-29 RX ORDER — ACETAMINOPHEN 325 MG/1
650 TABLET ORAL EVERY 6 HOURS PRN
Status: DISCONTINUED | OUTPATIENT
Start: 2022-10-29 | End: 2022-11-03 | Stop reason: HOSPADM

## 2022-10-29 RX ADMIN — ISOSORBIDE DINITRATE: 20 TABLET ORAL at 09:10

## 2022-10-29 RX ADMIN — TIOTROPIUM BROMIDE INHALATION SPRAY 2 PUFF: 3.12 SPRAY, METERED RESPIRATORY (INHALATION) at 08:10

## 2022-10-29 RX ADMIN — SACUBITRIL AND VALSARTAN 1 TABLET: 97; 103 TABLET, FILM COATED ORAL at 09:10

## 2022-10-29 RX ADMIN — ROPINIROLE HYDROCHLORIDE 4 MG: 1 TABLET, FILM COATED ORAL at 09:10

## 2022-10-29 RX ADMIN — METOPROLOL SUCCINATE 100 MG: 50 TABLET, EXTENDED RELEASE ORAL at 09:10

## 2022-10-29 RX ADMIN — INSULIN ASPART 7 UNITS: 100 INJECTION, SOLUTION INTRAVENOUS; SUBCUTANEOUS at 09:10

## 2022-10-29 RX ADMIN — ATORVASTATIN CALCIUM 40 MG: 20 TABLET, FILM COATED ORAL at 09:10

## 2022-10-29 RX ADMIN — MUPIROCIN: 20 OINTMENT TOPICAL at 09:10

## 2022-10-29 RX ADMIN — FUROSEMIDE 40 MG: 10 INJECTION, SOLUTION INTRAMUSCULAR; INTRAVENOUS at 03:10

## 2022-10-29 RX ADMIN — FERROUS SULFATE TAB 325 MG (65 MG ELEMENTAL FE) 1 EACH: 325 (65 FE) TAB at 09:10

## 2022-10-29 RX ADMIN — APIXABAN 2.5 MG: 2.5 TABLET, FILM COATED ORAL at 09:10

## 2022-10-29 RX ADMIN — PANTOPRAZOLE SODIUM 40 MG: 40 TABLET, DELAYED RELEASE ORAL at 09:10

## 2022-10-29 RX ADMIN — INSULIN ASPART 7 UNITS: 100 INJECTION, SOLUTION INTRAVENOUS; SUBCUTANEOUS at 05:10

## 2022-10-29 RX ADMIN — INSULIN ASPART 7 UNITS: 100 INJECTION, SOLUTION INTRAVENOUS; SUBCUTANEOUS at 12:10

## 2022-10-29 RX ADMIN — ACETAMINOPHEN 650 MG: 325 TABLET ORAL at 06:10

## 2022-10-29 NOTE — ASSESSMENT & PLAN NOTE
Patient with Permanent atrial fibrillation which is controlled currently with Beta Blocker. Patient is currently in sinus rhythm.[id number] Score: 3. HASBLED Score: . Anticoagulation indicated. Anticoagulation done with eliquis.

## 2022-10-29 NOTE — H&P
[facility] Medicine  History & Physical    Patient Name: Hafsa Hawley  MRN: [id number]  Patient Class: OP- Observation  Admission Date: 10/28/2022  Attending Physician: Colt Evans MD   Primary Care Provider: Cristobal Ann MD         Patient information was obtained from patient and ER records.     Subjective:     Principal Problem:<principal problem not specified>    Chief Complaint:   Chief Complaint   Patient presents with    Shortness of Breath     Patient to ER CC of SOB, chest pain started a few days ago getting worse         HPI: 56 y/o female with h/o combined systolic and diastolic CHF(EF 25% 3/2021), re-presents to the ED 24 hours later with persistent SOB not relieved with IV lasix in ED yesterday and trial of outpt diuresis at home. Her BNP is 2700, 2200 yesterday. Creatinine 1.8--- her baseline. TPN 1.061. 1.061 yesterday. Her EKG has prolonged QT and PVC's. No ST changes consistent with an infarct pattern. She says she has not taken her metolazone in about 2 weeks. She thinks this may be why she tipped over the edge. Admitted for supervised IV diuresis on telemetry.       Past Medical History:   Diagnosis Date    Anemia     Anticoagulant long-term use     Arthritis     Atrial fibrillation     CKD (chronic kidney disease), stage IV 5/8/2018    Congestive heart failure     COPD (chronic obstructive pulmonary disease)     Deep vein thrombosis     elevated bilirubin d/t Gilbert's syndrome     confirmed by [facility] genetic testing, evaluated by hepatology    Encounter for blood transfusion     GERD (gastroesophageal reflux disease)     Hypertension     Hypertensive cardiovascular-renal disease, stage 1-4 or unspecified chronic kidney disease, with heart failure 3/4/2022    Pheochromocytoma, malignant     Restrictive lung disease 4/26/2022    Right kidney mass     Sleep apnea     Thalassemia trait, alpha     Thyroid disease     Type 2 diabetes mellitus with  "hyperglycemia, without long-term current use of insulin 8/13/2020       Past Surgical History:   Procedure Laterality Date    APPENDECTOMY      BONE MARROW BIOPSY      CARDIAC DEFIBRILLATOR PLACEMENT Left 12/2016    CARDIAC ELECTROPHYSIOLOGY MAPPING AND ABLATION      CARDIAC ELECTROPHYSIOLOGY MAPPING AND ABLATION      COLONOSCOPY N/A 5/6/2022    Procedure: COLONOSCOPY;  Surgeon: Arely Betancourt MD;  Location: [facility] ENDO (2ND FLR);  Service: Endoscopy;  Laterality: N/A;  heart transplant candidate/ EF 25% - 2nd floor/ defib - [facility] - [facility]  Eliquis - per Dr. Cortez with [facility] [employer], Pt ok to hold Eliquis x 2 days prior-see media tab-outside correspondence dated 12/30/21  - [facility]  verbal instructions/portal instructions/email instructions - s    EYE SURGERY      due to running tears    FRACTURE SURGERY Left     hand 5th digit    HYSTERECTOMY      KNEE SURGERY Left 2016    hematoma    LIVER BIOPSY  10/24/2018    Minimal steatosis, predominantly macrovesicular, 1%, Minimal nonspecific portal inflammation, no fibrosis. No findings on biopsy to explain elevated bilirubin levels. Could be d/t Gilbert's =?- hemolysis    RIGHT HEART CATHETERIZATION Right 12/07/2021    Procedure: INSERTION, CATHETER, RIGHT HEART;  Surgeon: Irving Cardenas MD;  Location: [facility] CATH LAB;  Service: Cardiology;  Laterality: Right;    TRANSJUGULAR BIOPSY OF LIVER N/A 10/24/2018    Procedure: BIOPSY, LIVER, TRANSJUGULAR APPROACH;  [facility];  [facility];  Service: Radiology;  Laterality: N/A;       Review of patient's allergies indicates:   Allergen Reactions    Penicillins Hives    Iodinated contrast media Nausea And Vomiting    Oxycodone-acetaminophen Other (See Comments)     Nausea, Dizziness, Anxiety.  "I don't like how it makes me feel."   Given Hydromorphone 0.5mg IVP  Without problems.  Other reaction(s): Other (See Comments)    Diovan hct [valsartan-hydrochlorothiazide] Other (See " Comments)     Causes coughing    Irinotecan      Pt has homozygosity for the TA7 promoter variant that places this individual at significantly increased risk for   severe neutropenia (grade 4) when treated with the standard dose of irinotecan (risk approximately 50%).   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 Metabolism of other drugs not listed here may also be impacted by UGT1A1 enzyme activity.       Tramadol Nausea And Vomiting     Other reaction(s): Other (See Comments)       Current Facility-Administered Medications on File Prior to Encounter   Medication    0.9%  NaCl infusion    [COMPLETED] ondansetron injection 4 mg    vancomycin in dextrose 5 % 1 gram/250 mL IVPB 1,000 mg     Current Outpatient Medications on File Prior to Encounter   Medication Sig    albuterol (ACCUNEB) 0.63 mg/3 mL Nebu Take 3 mLs (0.63 mg total) by nebulization every 6 (six) hours as needed. Rescue    albuterol-ipratropium (DUO-NEB) 2.5 mg-0.5 mg/3 mL nebulizer solution Take 3 mLs by nebulization 2 (two) times a day.    allopurinoL (ZYLOPRIM) 100 MG tablet TAKE 2 TABLETS BY MOUTH ONCE DAILY    allopurinoL (ZYLOPRIM) 100 MG tablet Take 2 tablets (200 mg total) by mouth once daily.    ALPRAZolam (XANAX) 2 MG Tab Take 2 mg by mouth 2 (two) times daily as needed.    apixaban (ELIQUIS) 2.5 mg Tab Take 1 tablet (2.5 mg total) by mouth 2 (two) times a day. Decrease in dose    atorvastatin (LIPITOR) 40 MG tablet Take 1 tablet (40 mg total) by mouth every evening.    atorvastatin (LIPITOR) 40 MG tablet Take 1 tablet (40 mg total) by mouth every evening.    b complex vitamins tablet Take 1 tablet by mouth once daily.    blood sugar diagnostic (ACCU-CHEK GUIDE TEST STRIPS) Strp 1 strip by Other route 3 (three) times daily.    blood sugar diagnostic (BLOOD GLUCOSE TEST) Strp 1 strip by Misc.(Non-Drug; Combo  Route) route 3 (three) times daily.    blood-glucose meter kit Use as instructed    colchicine (COLCRYS) 0.6 mg tablet TAKE 1 TABLET BY MOUTH TWICE A DAY    diclofenac sodium (VOLTAREN) 1 % Gel APPLY 2 G TOPICALLY 3 (THREE) TIMES DAILY AS NEEDED (PAIN).    diltiazem HCl (DILTIAZEM 2% CREAM) Apply topically 3 (three) times daily. Apply topically to anal area.    DULoxetine (CYMBALTA) 60 MG capsule Take 1 capsule (60 mg total) by mouth once daily. Dose Increase    ELIQUIS 5 mg Tab TAKE 1 TABLET BY MOUTH TWICE A DAY    empagliflozin (JARDIANCE) 25 mg tablet Jardiance 25 mg tablet    ergocalciferol (ERGOCALCIFEROL) 50,000 unit Cap Take 1 capsule (50,000 Units total) by mouth every 7 days.    ergocalciferol (ERGOCALCIFEROL) 50,000 unit Cap Take 1 capsule (50,000 Units total) by mouth every 7 days.    ferrous sulfate 325 (65 FE) MG EC tablet Take 1 tablet (325 mg total) by mouth once daily.    fluticasone propionate (FLONASE) 50 mcg/actuation nasal spray 2 sprays by Each Nostril route daily as needed for Rhinitis.     furosemide (LASIX) 80 MG tablet Take 1 tablet (80 mg total) by mouth once daily. Use afternoon dose if needed    GAVILYTE-G 236-22.74-6.74 -5.86 gram suspension Take 4,000 mLs by mouth once.    glimepiride (AMARYL) 2 MG tablet TAKE 1 TABLET BY MOUTH EVERY DAY WITH BREAKFAST    glimepiride (AMARYL) 2 MG tablet Take 1 tablet (2 mg total) by mouth once daily with breakfast    hydrocortisone 2.5 % cream APPLY TOPICALLY 2 (TWO) TIMES DAILY AS NEEDED (HEMORRHOIDS).    lancets (ACCU-CHEK SOFTCLIX LANCETS) Misc 1 Device by Misc.(Non-Drug; Combo Route) route 3 (three) times daily.    LIDOcaine (LIDODERM) 5 % Place 1 patch onto the skin once daily. Remove & Discard patch within 12 hours or as directed by MD    LIDOcaine (LIDODERM) 5 % Place 1 patch onto the skin once daily. Remove & discard patch within 12 hours or as directed by MD    linaCLOtide (LINZESS) 145 mcg Cap capsule Take 1 capsule (145  mcg total) by mouth before breakfast. Hold if diarrhea    metOLazone (ZAROXOLYN) 5 MG tablet Take 5 mg by mouth.    metoprolol succinate (TOPROL-XL) 100 MG 24 hr tablet Take 100 mg by mouth once daily.    mometasone-formoterol (DULERA) 200-5 mcg/actuation inhaler Inhale 2 puffs into the lungs 2 (two) times daily. Controller    mometasone-formoterol (DULERA) 200-5 mcg/actuation inhaler Inhale 2 puffs into the lungs 2 times daily. controller    montelukast (SINGULAIR) 10 mg tablet Take 1 tablet every day by oral route for 30 days.    nitroGLYCERIN (NITROSTAT) 0.4 MG SL tablet Please see attached for detailed directions    NITROSTAT 0.4 mg SL tablet Take 2.5 tablets (1 mg total) by mouth every 5 (five) minutes as needed for Chest pain. No more than 3 tablets in 15 minutes.    nystatin (MYCOSTATIN) powder Apply topically 2 (two) times daily.    olopatadine (PATADAY) 0.2 % Drop Instill 1 drop into both eyes once daily    ondansetron (ZOFRAN-ODT) 8 MG TbDL Take 8 mg by mouth every 8 (eight) hours as needed.    pantoprazole (PROTONIX) 40 MG tablet TAKE 1 TABLET BY MOUTH DAILY IN THE MORNING    pantoprazole (PROTONIX) 40 MG tablet Take 1 tablet (40 mg total) by mouth every morning.    polyethylene glycol (GLYCOLAX) 17 gram PwPk Take 17 g by mouth 3 (three) times daily as needed (constipation/hard stools).    promethazine-codeine 6.25-10 mg/5 ml (PHENERGAN WITH CODEINE) 6.25-10 mg/5 mL syrup TAKE 5 mLs BY MOUTH EVERY 4 - 6 HOURS AS NEEDED    rOPINIRole (REQUIP) 4 MG tablet Take 1 tablet (4 mg total) by mouth once daily. Pt taking 4mg daily    sacubitriL-valsartan (ENTRESTO)  mg per tablet Take 1 tablet by mouth 2 (two) times daily.    sacubitriL-valsartan (ENTRESTO)  mg per tablet Take 1 tablet by mouth 2 (two) times a day.    scopolamine (TRANSDERM-SCOP) 1.3-1.5 mg (1 mg over 3 days) SMARTSIG:Patch(s) T-DERMAL Every 3 Days    SPIRIVA WITH HANDIHALER 18 mcg inhalation capsule INHALE 1 CAPSULE  INTO THE LUNGS ONCE DAILY.    VENTOLIN HFA 90 mcg/actuation inhaler Inhale 2 puffs into the lungs 2 (two) times daily as needed.     walker Misc 1 Device by Misc.(Non-Drug; Combo Route) route as needed.     Family History       Problem Relation (Age of Onset)    Cancer Mother    Cirrhosis Brother    Diabetes Brother    Heart attack Father    Heart disease Father, Sister, Brother, Sister, Brother    Hypertension Father, Brother          Tobacco Use    Smoking status: Never    Smokeless tobacco: Never   Substance and Sexual Activity    Alcohol use: Yes     Comment: up to 1 yr ago drank 2-3 drinks on occasion but sporadic    Drug use: No    Sexual activity: Yes     Partners: Male     Review of Systems   Constitutional:  Negative for chills and fever.   Respiratory:  Negative for shortness of breath.    Cardiovascular:  Negative for chest pain and palpitations.   Gastrointestinal:  Negative for abdominal pain, blood in stool, constipation and nausea.   Genitourinary:  Negative for difficulty urinating.   Psychiatric/Behavioral:  Negative for dysphoric mood, sleep disturbance and suicidal ideas. The patient is not nervous/anxious.    Objective:     Vital Signs (Most Recent):  Temp: (!) 95.8 °F (35.4 °C) (10/28/22 1413)  Pulse: 90 (10/28/22 1835)  Resp: (!) 27 (10/28/22 1835)  BP: 123/76 (10/28/22 1835)  SpO2: 98 % (10/28/22 1835)   Vital Signs (24h Range):  Temp:  [95.8 °F (35.4 °C)] 95.8 °F (35.4 °C)  Pulse:  [] 90  Resp:  [16-27] 27  SpO2:  [93 %-98 %] 98 %  BP: (120-163)/(76-92) 123/76        There is no height or weight on file to calculate BMI.    Physical Exam  Constitutional:       Appearance: She is well-developed.   HENT:      Head: Normocephalic and atraumatic.      Right Ear: External ear normal.      Left Ear: External ear normal.      Nose: Nose normal.   Eyes:      Extraocular Movements: EOM normal.      Pupils: Pupils are equal, round, and reactive to light.   Neck:      Thyroid: No  thyromegaly.      Vascular: No JVD.      Trachea: No tracheal deviation.   Cardiovascular:      Rate and Rhythm: Normal rate.      Heart sounds: Normal heart sounds. No murmur heard.  Pulmonary:      Effort: Pulmonary effort is normal. No respiratory distress.      Breath sounds: No wheezing or rales.   Chest:      Chest wall: No tenderness.   Abdominal:      General: Bowel sounds are normal. There is no distension.      Palpations: Abdomen is soft. There is no mass.      Tenderness: There is no abdominal tenderness. There is no guarding or rebound.   Musculoskeletal:         General: No tenderness. Normal range of motion.      Cervical back: Normal range of motion and neck supple.      Right lower leg: Edema present.      Left lower leg: Edema present.      Comments: Trace edema   Lymphadenopathy:      Cervical: No cervical adenopathy.   Skin:     General: Skin is warm and dry.      Coloration: Skin is not pale.      Findings: No erythema or rash.   Neurological:      Mental Status: She is alert and oriented to person, place, and time.      Cranial Nerves: No cranial nerve deficit.      Motor: No abnormal muscle tone.      Coordination: Coordination normal.      Deep Tendon Reflexes: Reflexes are normal and symmetric. Reflexes normal.   Psychiatric:         Behavior: Behavior normal.         Thought Content: Thought content normal.         Judgment: Judgment normal.         CRANIAL NERVES     CN III, IV, VI   Pupils are equal, round, and reactive to light.  Extraocular motions are normal.      Significant Labs: All pertinent labs within the past 24 hours have been reviewed.  Recent Lab Results         10/28/22  1438   10/28/22  1426        Influenza A, Molecular Negative         Influenza B, Molecular Negative         Albumin   3.5       Alkaline Phosphatase   67       ALT   34       Anion Gap   12       Aniso   Marked       AST   25       Baso #   0.05       Basophil %   0.7       BILIRUBIN TOTAL   1.4  Comment:  For infants and newborns, interpretation of results should be based  on gestational age, weight and in agreement with clinical  observations.    Premature Infant recommended reference ranges:  Up to 24 hours.............<8.0 mg/dL  Up to 48 hours............<12.0 mg/dL  3-5 days..................<15.0 mg/dL  6-29 days.................<15.0 mg/dL         BNP   2,771  Comment: Values of less than 100 pg/ml are consistent with non-CHF populations.       BUN   36       Calcium   9.1       Chloride   108       CO2   23       CPK   45          45       CPK MB   3.6       Creatinine   1.8       Differential Method   Automated       eGFR   32       Eos #   0.2       Eosinophil %   2.9       Flu A & B Source Nasal swab         Fragmented Cells   Moderate       Glucose   158       Gran # (ANC)   5.4       Gran %   74.6       Hematocrit   30.3       Hemoglobin   8.8       Hypo   Occasional       Immature Grans (Abs)   0.05  Comment: Mild elevation in immature granulocytes is non specific and   can be seen in a variety of conditions including stress response,   acute inflammation, trauma and pregnancy. Correlation with other   laboratory and clinical findings is essential.         Immature Granulocytes   0.7       Lymph #   1.0       Lymph %   13.3       MB %   8.0  Comment: To be positive, the MB% must be greater than 5% AND the CK-MB  greater than 6.5 ng/mL. Values not in the reference interval,   but not qualifying as positive, should be considered trace.         MCH   17.3       MCHC   29.0       MCV   60       Mono #   0.6       Mono %   7.8       MPV   SEE COMMENT  Comment: Result not available.       nRBC   1       Ovalocytes   Occasional       Platelet Estimate   Appears normal       Platelets   180       Poikilocytosis   Marked       Poly   Occasional       Potassium   4.2       PROTEIN TOTAL   6.8       RBC   5.08       RDW   27.6       SARS-CoV-2 RNA, Amplification, Qual Negative  Comment: This test utilizes  isothermal nucleic acid amplification technology   to   detect the SARS-CoV-2 RdRp nucleic acid segment. The analytical   sensitivity   (limit of detection) is 500 copies/swab.     A POSITIVE result is indicative of the presence of SARS-CoV-2 RNA;   clinical   correlation with patient history and other diagnostic information is   necessary to determine patient infection status.    A NEGATIVE result means that SARS-CoV-2 nucleic acids are not present   above   the limit of detection. A NEGATIVE result should be treated as   presumptive.   It does not rule out the possibility of COVID-19 and should not be   the sole   basis for treatment decisions. If COVID-19 is strongly suspected   based on   clinical and exposure history, re-testing using an alternate   molecular assay   should be considered.     This test is only for use under the Food and Drug Administration s   Emergency   Use Authorization (EUA).     Commercial kits are provided by [employer]. Performance   characteristics of the EUA have been independently verified by   [facility] Department of Pathology and Laboratory Medicine.   _________________________________________________________________   The authorized Fact Sheet for Healthcare Providers and the authorized   Fact   Sheet for Patients of the ID NOW COVID-19 are available on the FDA   website:   https://www.fda.gov/media/794968/download   https://www.fda.gov/media/992166/download           Sodium   143       Spherocytes   Occasional       Target Cells   Occasional       Teardrop Cells   Occasional       Troponin I   1.061  Comment: The reference interval for Troponin I represents the 99th percentile   cutoff   for our facility and is consistent with 3rd generation assay   performance.         WBC   7.27               Significant Imaging: I have reviewed all pertinent imaging results/findings within the past 24 hours.  CXR: I have reviewed all pertinent results/findings within the  past 24 hours and my personal findings are:  CHF  EKG: I have reviewed all pertinent results/findings within the past 24 hours and my personal findings are: PVC with prolonged Qt. Nonspecific ST changes     Assessment/Plan:     Hypertensive cardiovascular-renal disease, stage 1-4 or unspecified chronic kidney disease, with heart failure  Currently at her baseline creatinine, 1.8  Will monitor closely while diuresing       Type 2 diabetes mellitus without complication, without long-term current use of insulin  Patient's FSGs are controlled on current medication regimen.  Last A1c reviewed-   Lab Results   Component Value Date    HGBA1C 6.5 (H) 08/23/2022     Most recent fingerstick glucose reviewed- No results for input(s): POCTGLUCOSE in the last 24 hours.  Current correctional scale  Medium  Increase anti-hyperglycemic dose as follows-   Antihyperglycemics (From admission, onward)    Start     Stop Route Frequency Ordered    10/29/22 0900  insulin detemir U-100 pen 20 Units         -- SubQ Daily 10/28/22 1915    10/29/22 0715  insulin aspart U-100 pen 7 Units         -- SubQ 3 times daily with meals 10/28/22 1915    10/28/22 1914  insulin aspart U-100 pen 0-5 Units         -- SubQ Before meals & nightly PRN 10/28/22 1915        Hold Oral hypoglycemics while patient is in the hospital.    Chronic combined systolic and diastolic heart failure  Last EF 25% 3/2021. [facility] may have more recent one   She sees Dr. Dana otero  entresto  Lasix 80 daily at home   Will start with 40mg IV BID here   Continue home meds       NICM (nonischemic cardiomyopathy)  Last EF 25% 3/2021. [facility] may have more recent one   She sees Dr. Dana otero  entresto  Lasix 80 daily at home   Will start with 40mg IV BID here   Continue home meds       Paroxysmal atrial fibrillation  Patient with Permanent atrial fibrillation which is controlled currently with Beta Blocker. Patient is currently in sinus rhythm.[id number] Score: 3. HASBLED  Score: . Anticoagulation indicated. Anticoagulation done with eliquis.        Essential hypertension  entresto  Metoprolol       Microcytic anemia  Lab Results   Component Value Date    IRON 85 02/15/2022    TRANSFERRIN 209 02/15/2022    TIBC 309 02/15/2022    FESATURATED 28 02/15/2022      Continue home iron      Elevated troponin  Due to her dilated cardiomyopathy and persistently low EF. Chronically leaks TPN         Fibromyalgia affecting multiple sites  continue cymbalta while here         VTE Risk Mitigation (From admission, onward)         Ordered     apixaban tablet 2.5 mg  2 times daily         10/28/22 1943     IP VTE HIGH RISK PATIENT  Once         10/28/22 1915     Place sequential compression device  Until discontinued         10/28/22 1915                   Colt Evans MD  Department of Hospital Medicine   [facility] - Emergency Dept

## 2022-10-29 NOTE — PROGRESS NOTES
[facility] Medicine  Progress Note    Patient Name: Hafsa Hawley  MRN: [id number]  Patient Class: IP- Inpatient   Admission Date: 10/28/2022  Length of Stay: 0 days  Attending Physician: Colt Evans MD  Primary Care Provider: Cristobal Ann MD        Subjective:     Principal Problem:NICM (nonischemic cardiomyopathy)        HPI:  56 y/o female with h/o combined systolic and diastolic CHF(EF 25% 3/2021), re-presents to the ED 24 hours later with persistent SOB not relieved with IV lasix in ED yesterday and trial of outpt diuresis at home. Her BNP is 2700, 2200 yesterday. Creatinine 1.8--- her baseline. TPN 1.061. 1.061 yesterday. Her EKG has prolonged QT and PVC's. No ST changes consistent with an infarct pattern. She says she has not taken her metolazone in about 2 weeks. She thinks this may be why she tipped over the edge. Admitted for supervised IV diuresis on telemetry.       Overview/Hospital Course:  Put out 2 liters since admit   BP is up this am. 170s/100s   Creatinine 1.9, 1.8 at admit   Reports some mild improvement in her SOB   97% on 3 liters         Past Medical History:   Diagnosis Date    Anemia     Anticoagulant long-term use     Arthritis     Atrial fibrillation     CKD (chronic kidney disease), stage IV 5/8/2018    Congestive heart failure     COPD (chronic obstructive pulmonary disease)     Deep vein thrombosis     elevated bilirubin d/t Gilbert's syndrome     confirmed by [facility] genetic testing, evaluated by hepatology    Encounter for blood transfusion     GERD (gastroesophageal reflux disease)     Hypertension     Hypertensive cardiovascular-renal disease, stage 1-4 or unspecified chronic kidney disease, with heart failure 3/4/2022    Pheochromocytoma, malignant     Restrictive lung disease 4/26/2022    Right kidney mass     Sleep apnea     Thalassemia trait, alpha     Thyroid disease     Type 2 diabetes mellitus with hyperglycemia, without  "long-term current use of insulin 8/13/2020       Past Surgical History:   Procedure Laterality Date    APPENDECTOMY      BONE MARROW BIOPSY      CARDIAC DEFIBRILLATOR PLACEMENT Left 12/2016    CARDIAC ELECTROPHYSIOLOGY MAPPING AND ABLATION      CARDIAC ELECTROPHYSIOLOGY MAPPING AND ABLATION      COLONOSCOPY N/A 5/6/2022    Procedure: COLONOSCOPY;  Surgeon: Arely Betancourt MD;  Location: [facility] ENDO (2ND [facility]);  Service: Endoscopy;  Laterality: N/A;  heart transplant candidate/ EF 25% - 2nd floor/ defib - Biotronik - ERW  Eliquis - per Dr. Cortez with [facility] [place], Pt ok to hold Eliquis x 2 days prior-see media tab-outside correspondence dated 12/30/21  - [facility]  verbal instructions/portal instructions/email instructions - s    EYE SURGERY      due to running tears    FRACTURE SURGERY Left     hand 5th digit    HYSTERECTOMY      KNEE SURGERY Left 2016    hematoma    LIVER BIOPSY  10/24/2018    Minimal steatosis, predominantly macrovesicular, 1%, Minimal nonspecific portal inflammation, no fibrosis. No findings on biopsy to explain elevated bilirubin levels. Could be d/t Gilbert's =?- hemolysis    RIGHT HEART CATHETERIZATION Right 12/07/2021    Procedure: INSERTION, CATHETER, RIGHT HEART;  Surgeon: Irving Cardenas MD;  Location: [facility] CATH LAB;  Service: Cardiology;  Laterality: Right;    TRANSJUGULAR BIOPSY OF LIVER N/A 10/24/2018    Procedure: BIOPSY, LIVER, TRANSJUGULAR APPROACH;  [facility];  Location: [facility] OR [address] [place] [address];  Service: Radiology;  Laterality: N/A;       Review of patient's allergies indicates:   Allergen Reactions    Penicillins Hives    Iodinated contrast media Nausea And Vomiting    Oxycodone-acetaminophen Other (See Comments)     Nausea, Dizziness, Anxiety.  "I don't like how it makes me feel."   Given Hydromorphone 0.5mg IVP  Without problems.  Other reaction(s): Other (See Comments)    Diovan hct [valsartan-hydrochlorothiazide] Other (See Comments)     Causes coughing "    Irinotecan      Pt has homozygosity for the TA7 promoter variant that places this individual at significantly increased risk for   severe neutropenia (grade 4) when treated with the standard dose of irinotecan (risk approximately 50%).   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 Metabolism of other drugs not listed here may also be impacted by UGT1A1 enzyme activity.       Tramadol Nausea And Vomiting     Other reaction(s): Other (See Comments)       Current Facility-Administered Medications on File Prior to Encounter   Medication    0.9%  NaCl infusion    vancomycin in dextrose 5 % 1 gram/250 mL IVPB 1,000 mg     Current Outpatient Medications on File Prior to Encounter   Medication Sig    albuterol (ACCUNEB) 0.63 mg/3 mL Nebu Take 3 mLs (0.63 mg total) by nebulization every 6 (six) hours as needed. Rescue    albuterol-ipratropium (DUO-NEB) 2.5 mg-0.5 mg/3 mL nebulizer solution Take 3 mLs by nebulization 2 (two) times a day.    allopurinoL (ZYLOPRIM) 100 MG tablet TAKE 2 TABLETS BY MOUTH ONCE DAILY    allopurinoL (ZYLOPRIM) 100 MG tablet Take 2 tablets (200 mg total) by mouth once daily.    ALPRAZolam (XANAX) 2 MG Tab Take 2 mg by mouth 2 (two) times daily as needed.    apixaban (ELIQUIS) 2.5 mg Tab Take 1 tablet (2.5 mg total) by mouth 2 (two) times a day. Decrease in dose (Patient taking differently: Take 5 mg by mouth 2 (two) times a day.)    atorvastatin (LIPITOR) 40 MG tablet Take 1 tablet (40 mg total) by mouth every evening.    atorvastatin (LIPITOR) 40 MG tablet Take 1 tablet (40 mg total) by mouth every evening.    b complex vitamins tablet Take 1 tablet by mouth once daily.    blood sugar diagnostic (ACCU-CHEK GUIDE TEST STRIPS) Strp 1 strip by Other route 3 (three) times daily.    blood sugar diagnostic (BLOOD GLUCOSE TEST) Strp 1 strip by Misc.(Non-Drug; Combo  Route) route 3 (three) times daily.    blood-glucose meter kit Use as instructed    colchicine (COLCRYS) 0.6 mg tablet TAKE 1 TABLET BY MOUTH TWICE A DAY    diclofenac sodium (VOLTAREN) 1 % Gel APPLY 2 G TOPICALLY 3 (THREE) TIMES DAILY AS NEEDED (PAIN).    diltiazem HCl (DILTIAZEM 2% CREAM) Apply topically 3 (three) times daily. Apply topically to anal area.    ELIQUIS 5 mg Tab TAKE 1 TABLET BY MOUTH TWICE A DAY    empagliflozin (JARDIANCE) 25 mg tablet Jardiance 25 mg tablet    ergocalciferol (ERGOCALCIFEROL) 50,000 unit Cap Take 1 capsule (50,000 Units total) by mouth every 7 days.    ergocalciferol (ERGOCALCIFEROL) 50,000 unit Cap Take 1 capsule (50,000 Units total) by mouth every 7 days.    ferrous sulfate 325 (65 FE) MG EC tablet Take 1 tablet (325 mg total) by mouth once daily.    fluticasone propionate (FLONASE) 50 mcg/actuation nasal spray 2 sprays by Each Nostril route daily as needed for Rhinitis.     furosemide (LASIX) 80 MG tablet Take 1 tablet (80 mg total) by mouth once daily. Use afternoon dose if needed    glimepiride (AMARYL) 2 MG tablet TAKE 1 TABLET BY MOUTH EVERY DAY WITH BREAKFAST    glimepiride (AMARYL) 2 MG tablet Take 1 tablet (2 mg total) by mouth once daily with breakfast    hydrocortisone 2.5 % cream APPLY TOPICALLY 2 (TWO) TIMES DAILY AS NEEDED (HEMORRHOIDS).    lancets (ACCU-CHEK SOFTCLIX LANCETS) Misc 1 Device by Misc.(Non-Drug; Combo Route) route 3 (three) times daily.    LIDOcaine (LIDODERM) 5 % Place 1 patch onto the skin once daily. Remove & Discard patch within 12 hours or as directed by MD    LIDOcaine (LIDODERM) 5 % Place 1 patch onto the skin once daily. Remove & discard patch within 12 hours or as directed by MD    linaCLOtide (LINZESS) 145 mcg Cap capsule Take 1 capsule (145 mcg total) by mouth before breakfast. Hold if diarrhea    metOLazone (ZAROXOLYN) 5 MG tablet Take 5 mg by mouth.    metoprolol succinate (TOPROL-XL) 100 MG 24 hr tablet Take 100 mg by  mouth once daily.    mometasone-formoterol (DULERA) 200-5 mcg/actuation inhaler Inhale 2 puffs into the lungs 2 (two) times daily. Controller    mometasone-formoterol (DULERA) 200-5 mcg/actuation inhaler Inhale 2 puffs into the lungs 2 times daily. controller    montelukast (SINGULAIR) 10 mg tablet Take 1 tablet every day by oral route for 30 days.    nitroGLYCERIN (NITROSTAT) 0.4 MG SL tablet Please see attached for detailed directions    NITROSTAT 0.4 mg SL tablet Take 2.5 tablets (1 mg total) by mouth every 5 (five) minutes as needed for Chest pain. No more than 3 tablets in 15 minutes.    nystatin (MYCOSTATIN) powder Apply topically 2 (two) times daily.    olopatadine (PATADAY) 0.2 % Drop Instill 1 drop into both eyes once daily    ondansetron (ZOFRAN-ODT) 8 MG TbDL Take 8 mg by mouth every 8 (eight) hours as needed.    pantoprazole (PROTONIX) 40 MG tablet TAKE 1 TABLET BY MOUTH DAILY IN THE MORNING    pantoprazole (PROTONIX) 40 MG tablet Take 1 tablet (40 mg total) by mouth every morning.    polyethylene glycol (GLYCOLAX) 17 gram PwPk Take 17 g by mouth 3 (three) times daily as needed (constipation/hard stools).    promethazine-codeine 6.25-10 mg/5 ml (PHENERGAN WITH CODEINE) 6.25-10 mg/5 mL syrup TAKE 5 mLs BY MOUTH EVERY 4 - 6 HOURS AS NEEDED    rOPINIRole (REQUIP) 4 MG tablet Take 1 tablet (4 mg total) by mouth once daily. Pt taking 4mg daily    sacubitriL-valsartan (ENTRESTO)  mg per tablet Take 1 tablet by mouth 2 (two) times daily.    sacubitriL-valsartan (ENTRESTO)  mg per tablet Take 1 tablet by mouth 2 (two) times a day.    scopolamine (TRANSDERM-SCOP) 1.3-1.5 mg (1 mg over 3 days) SMARTSIG:Patch(s) T-DERMAL Every 3 Days    SPIRIVA WITH HANDIHALER 18 mcg inhalation capsule INHALE 1 CAPSULE INTO THE LUNGS ONCE DAILY.    VENTOLIN HFA 90 mcg/actuation inhaler Inhale 2 puffs into the lungs 2 (two) times daily as needed.     walker Misc 1 Device by Misc.(Non-Drug; Combo Route)  route as needed.    [DISCONTINUED] DULoxetine (CYMBALTA) 60 MG capsule Take 1 capsule (60 mg total) by mouth once daily. Dose Increase    [DISCONTINUED] GAVILYTE-G 236-22.74-6.74 -5.86 gram suspension Take 4,000 mLs by mouth once.     Family History       Problem Relation (Age of Onset)    Cancer Mother    Cirrhosis Brother    Diabetes Brother    Heart attack Father    Heart disease Father, Sister, Brother, Sister, Brother    Hypertension Father, Brother          Tobacco Use    Smoking status: Never    Smokeless tobacco: Never   Substance and Sexual Activity    Alcohol use: Yes     Comment: up to 1 yr ago drank 2-3 drinks on occasion but sporadic    Drug use: No    Sexual activity: Yes     Partners: Male     Review of Systems   Constitutional:  Negative for chills and fever.   Respiratory:  Positive for shortness of breath and wheezing.    Cardiovascular:  Negative for chest pain and palpitations.   Gastrointestinal:  Negative for abdominal pain, blood in stool, constipation and nausea.   Genitourinary:  Negative for difficulty urinating.   Psychiatric/Behavioral:  Negative for dysphoric mood, sleep disturbance and suicidal ideas. The patient is not nervous/anxious.    Objective:     Vital Signs (Most Recent):  Temp: 97.2 °F (36.2 °C) (10/29/22 0715)  Pulse: 78 (10/29/22 0800)  Resp: 16 (10/29/22 0800)  BP: (!) 174/106 (10/29/22 0800)  SpO2: 97 % (10/29/22 0800)   Vital Signs (24h Range):  Temp:  [95.8 °F (35.4 °C)-98.2 °F (36.8 °C)] 97.2 °F (36.2 °C)  Pulse:  [] 78  Resp:  [13-27] 16  SpO2:  [93 %-100 %] 97 %  BP: (120-179)/() 174/106     Weight: 97.5 kg (215 lb)  Body mass index is 34.7 kg/m².    Physical Exam  Constitutional:       Appearance: She is well-developed.   HENT:      Head: Normocephalic and atraumatic.      Right Ear: External ear normal.      Left Ear: External ear normal.      Nose: Nose normal.   Eyes:      Extraocular Movements: EOM normal.      Pupils: Pupils are equal, round,  and reactive to light.   Neck:      Thyroid: No thyromegaly.      Vascular: No JVD.      Trachea: No tracheal deviation.   Cardiovascular:      Rate and Rhythm: Normal rate.      Heart sounds: Normal heart sounds. No murmur heard.  Pulmonary:      Effort: Pulmonary effort is normal. No respiratory distress.      Breath sounds: Wheezing present. No rales.   Chest:      Chest wall: No tenderness.   Abdominal:      General: Bowel sounds are normal. There is no distension.      Palpations: Abdomen is soft. There is no mass.      Tenderness: There is no abdominal tenderness. There is no guarding or rebound.   Musculoskeletal:         General: No tenderness. Normal range of motion.      Cervical back: Normal range of motion and neck supple.      Right lower leg: No edema.      Left lower leg: No edema.      Comments: Trace edema   Lymphadenopathy:      Cervical: No cervical adenopathy.   Skin:     General: Skin is warm and dry.      Coloration: Skin is not pale.      Findings: No erythema or rash.   Neurological:      Mental Status: She is alert and oriented to person, place, and time.      Cranial Nerves: No cranial nerve deficit.      Motor: No abnormal muscle tone.      Coordination: Coordination normal.      Deep Tendon Reflexes: Reflexes are normal and symmetric. Reflexes normal.   Psychiatric:         Behavior: Behavior normal.         Thought Content: Thought content normal.         Judgment: Judgment normal.         CRANIAL NERVES     CN III, IV, VI   Pupils are equal, round, and reactive to light.  Extraocular motions are normal.      Significant Labs: All pertinent labs within the past 24 hours have been reviewed.  Recent Lab Results         10/29/22  0143   10/28/22  2126   10/28/22  1438   10/28/22  1426        Influenza A, Molecular     Negative         Influenza B, Molecular     Negative         Albumin 3.5       3.5       Alkaline Phosphatase 62       67       ALT 30       34       Anion Gap 14       12        Aniso Marked       Marked       AST 21       25       Baso # 0.03       0.05       Basophil % 0.4       0.7       BILIRUBIN TOTAL 1.8  Comment: For infants and newborns, interpretation of results should be based  on gestational age, weight and in agreement with clinical  observations.    Premature Infant recommended reference ranges:  Up to 24 hours.............<8.0 mg/dL  Up to 48 hours............<12.0 mg/dL  3-5 days..................<15.0 mg/dL  6-29 days.................<15.0 mg/dL         1.4  Comment: For infants and newborns, interpretation of results should be based  on gestational age, weight and in agreement with clinical  observations.    Premature Infant recommended reference ranges:  Up to 24 hours.............<8.0 mg/dL  Up to 48 hours............<12.0 mg/dL  3-5 days..................<15.0 mg/dL  6-29 days.................<15.0 mg/dL         BNP       2,771  Comment: Values of less than 100 pg/ml are consistent with non-CHF populations.       BUN 36       36       Calcium 9.1       9.1       Chloride 107       108       CO2 23       23       CPK       45              45       CPK MB       3.6       Creatinine 1.9       1.8       Differential Method Automated       Automated       eGFR 30       32       Eos # 0.2       0.2       Eosinophil % 3.3       2.9       Flu A & B Source     Nasal swab         Fragmented Cells       Moderate       Glucose 88       158       Gran # (ANC) 4.8       5.4       Gran % 68.7       74.6       Hematocrit 29.1       30.3       Hemoglobin 8.6       8.8       Hypo Occasional       Occasional       Immature Grans (Abs) 0.05  Comment: Mild elevation in immature granulocytes is non specific and   can be seen in a variety of conditions including stress response,   acute inflammation, trauma and pregnancy. Correlation with other   laboratory and clinical findings is essential.         0.05  Comment: Mild elevation in immature granulocytes is non specific and   can be seen in  a variety of conditions including stress response,   acute inflammation, trauma and pregnancy. Correlation with other   laboratory and clinical findings is essential.         Immature Granulocytes 0.7       0.7       Lymph # 1.3       1.0       Lymph % 18.3       13.3       Magnesium 1.5             MB %       8.0  Comment: To be positive, the MB% must be greater than 5% AND the CK-MB  greater than 6.5 ng/mL. Values not in the reference interval,   but not qualifying as positive, should be considered trace.         MCH 17.4       17.3       MCHC 29.6       29.0       MCV 59       60       Mono # 0.6       0.6       Mono % 8.6       7.8       MPV SEE COMMENT  Comment: Result not available.       SEE COMMENT  Comment: Result not available.       nRBC 1       1       Ovalocytes Moderate       Occasional       Platelet Estimate Appears normal       Appears normal       Platelets 174       180       POCT Glucose   92           Poikilocytosis Marked       Marked       Poly Occasional       Occasional       Potassium 3.8       4.2       PROTEIN TOTAL 6.6       6.8       RBC 4.93       5.08       RDW 27.0       27.6       SARS-CoV-2 RNA, Amplification, Qual     Negative  Comment: This test utilizes isothermal nucleic acid amplification technology   to   detect the SARS-CoV-2 RdRp nucleic acid segment. The analytical   sensitivity   (limit of detection) is 500 copies/swab.     A POSITIVE result is indicative of the presence of SARS-CoV-2 RNA;   clinical   correlation with patient history and other diagnostic information is   necessary to determine patient infection status.    A NEGATIVE result means that SARS-CoV-2 nucleic acids are not present   above   the limit of detection. A NEGATIVE result should be treated as   presumptive.   It does not rule out the possibility of COVID-19 and should not be   the sole   basis for treatment decisions. If COVID-19 is strongly suspected   based on   clinical and exposure history,  re-testing using an alternate   molecular assay   should be considered.     This test is only for use under the Food and Drug Administration s   Emergency   Use Authorization (EUA).     Commercial kits are provided by [employer]. Performance   characteristics of the EUA have been independently verified by   [facility] Department of Pathology and Laboratory Medicine.   _________________________________________________________________   The authorized Fact Sheet for Healthcare Providers and the authorized   Fact   Sheet for Patients of the ID NOW COVID-19 are available on the FDA   website:   https://www.fda.gov/media/488265/download   https://www.fda.gov/media/928305/download           Schistocytes Present             Sodium 144       143       Spherocytes Occasional       Occasional       Target Cells Occasional       Occasional       Teardrop Cells Occasional       Occasional       Troponin I       1.061  Comment: The reference interval for Troponin I represents the 99th percentile   cutoff   for our facility and is consistent with 3rd generation assay   performance.         WBC 6.94       7.27               Significant Imaging: I have reviewed all pertinent imaging results/findings within the past 24 hours.  CXR: I have reviewed all pertinent results/findings within the past 24 hours and my personal findings are:  CHF  EKG: I have reviewed all pertinent results/findings within the past 24 hours and my personal findings are: PVC with prolonged Qt. Nonspecific ST changes       Assessment/Plan:      * NICM (nonischemic cardiomyopathy)  Last EF 25% 3/2021. [facility] may have more recent one   She sees Dr. Cortez   topindigo  entresto  Lasix 80 daily at home   Will start with 40mg IV BID here   Continue home meds       Hypertensive cardiovascular-renal disease, stage 1-4 or unspecified chronic kidney disease, with heart failure  Currently at her baseline creatinine, 1.8  Will monitor closely while  diuresing   BMP  Lab Results   Component Value Date     10/29/2022    K 3.8 10/29/2022     10/29/2022    CO2 23 10/29/2022    BUN 36 (H) 10/29/2022    CREATININE 1.9 (H) 10/29/2022    CALCIUM 9.1 10/29/2022    ANIONGAP 14 10/29/2022    ESTGFRAFRICA 29.7 (A) 04/26/2022    EGFRNONAA 25.7 (A) 04/26/2022           Type 2 diabetes mellitus without complication, without long-term current use of insulin  Patient's FSGs are controlled on current medication regimen.  Last A1c reviewed-   Lab Results   Component Value Date    HGBA1C 6.5 (H) 08/23/2022     Most recent fingerstick glucose reviewed-   Recent Labs   Lab 10/28/22  2126   POCTGLUCOSE 92     Current correctional scale  Medium  Increase anti-hyperglycemic dose as follows-   Antihyperglycemics (From admission, onward)    Start     Stop Route Frequency Ordered    10/29/22 0900  insulin detemir U-100 pen 20 Units         -- SubQ Daily 10/28/22 1915    10/29/22 0715  insulin aspart U-100 pen 7 Units         -- SubQ 3 times daily with meals 10/28/22 1915    10/28/22 1914  insulin aspart U-100 pen 0-5 Units         -- SubQ Before meals & nightly PRN 10/28/22 1915        Hold Oral hypoglycemics while patient is in the hospital.    Chronic combined systolic and diastolic heart failure  Last EF 25% 3/2021. [facility] may have more recent one   She sees Dr. Cortez   toprolol  entresto  Lasix 80 daily at home   Will start with 40mg IV BID here   Continue home meds   Add Bidil       Paroxysmal atrial fibrillation  Patient with Permanent atrial fibrillation which is controlled currently with Beta Blocker. Patient is currently in sinus rhythm.[id number] Score: 3. HASBLED Score: . Anticoagulation indicated. Anticoagulation done with eliquis.        Essential hypertension  entresto  Metoprolol  Add bidil        Microcytic anemia  Lab Results   Component Value Date    IRON 85 02/15/2022    TRANSFERRIN 209 02/15/2022    TIBC 309 02/15/2022    FESATURATED 28 02/15/2022       Continue home iron      Elevated troponin  Due to her dilated cardiomyopathy and persistently low EF. Chronically leaks TPN   Recent Labs   Lab 10/28/22  1426   TROPONINI 1.061*             Fibromyalgia affecting multiple sites  continue cymbalta while here         VTE Risk Mitigation (From admission, onward)         Ordered     apixaban tablet 2.5 mg  2 times daily         10/28/22 1943     IP VTE HIGH RISK PATIENT  Once         10/28/22 1915     Place sequential compression device  Until discontinued         10/28/22 1915                Discharge Planning   CHIQUI:      Code Status: Full Code   Is the patient medically ready for discharge?:     Reason for patient still in hospital (select all that apply): Treatment               Critical care time spent on the evaluation and treatment of severe organ dysfunction, review of pertinent labs and imaging studies, discussions with consulting providers and discussions with patient/family: 45 minutes.      Colt Evans MD  Department of Hospital Medicine   [place] - Intensive Care

## 2022-10-29 NOTE — PROGRESS NOTES
[facility]           Pharmacy        Current Drug Shortage     Due to national backorder and [facility] is critically low on inventory of Dextrose 50% (D50) Syringes and Vials, pharmacy has automatically switched from D50% to D10% IVPB at the equivalent dose until resolution of the shortage per P&T approved protocol.               Fabrizio Haddad, PharmD  [phone]

## 2022-10-29 NOTE — ASSESSMENT & PLAN NOTE
Patient's FSGs are controlled on current medication regimen.  Last A1c reviewed-   Lab Results   Component Value Date    HGBA1C 6.5 (H) 08/23/2022     Most recent fingerstick glucose reviewed-   Recent Labs   Lab 10/28/22  2126   POCTGLUCOSE 92     Current correctional scale  Medium  Increase anti-hyperglycemic dose as follows-   Antihyperglycemics (From admission, onward)    Start     Stop Route Frequency Ordered    10/29/22 0900  insulin detemir U-100 pen 20 Units         -- SubQ Daily 10/28/22 1915    10/29/22 0715  insulin aspart U-100 pen 7 Units         -- SubQ 3 times daily with meals 10/28/22 1915    10/28/22 1914  insulin aspart U-100 pen 0-5 Units         -- SubQ Before meals & nightly PRN 10/28/22 1915        Hold Oral hypoglycemics while patient is in the hospital.

## 2022-10-29 NOTE — ASSESSMENT & PLAN NOTE
Patient's FSGs are controlled on current medication regimen.  Last A1c reviewed-   Lab Results   Component Value Date    HGBA1C 6.5 (H) 08/23/2022     Most recent fingerstick glucose reviewed- No results for input(s): POCTGLUCOSE in the last 24 hours.  Current correctional scale  Medium  Increase anti-hyperglycemic dose as follows-   Antihyperglycemics (From admission, onward)    Start     Stop Route Frequency Ordered    10/29/22 0900  insulin detemir U-100 pen 20 Units         -- SubQ Daily 10/28/22 1915    10/29/22 0715  insulin aspart U-100 pen 7 Units         -- SubQ 3 times daily with meals 10/28/22 1915    10/28/22 1914  insulin aspart U-100 pen 0-5 Units         -- SubQ Before meals & nightly PRN 10/28/22 1915        Hold Oral hypoglycemics while patient is in the hospital.

## 2022-10-29 NOTE — ASSESSMENT & PLAN NOTE
Lab Results   Component Value Date    IRON 85 02/15/2022    TRANSFERRIN 209 02/15/2022    TIBC 309 02/15/2022    FESATURATED 28 02/15/2022      Continue home iron

## 2022-10-29 NOTE — ASSESSMENT & PLAN NOTE
Due to her dilated cardiomyopathy and persistently low EF. Chronically leaks TPN   Recent Labs   Lab 10/28/22  1426   TROPONINI 1.061*

## 2022-10-29 NOTE — HOSPITAL COURSE
Put out 2 liters since admit   BP is up this am. 170s/100s   Creatinine 1.9, 1.8 at admit   Reports some mild improvement in her SOB   97% on 3 liters       10/30  Diureses has slowed down a bit. Put out 900ml last 24 hours and is negative 300 for the shift.  BP much better after adding Bidil.  Ranging /54-58  She is 92-94% on 1.5 LPM   BMP pending at time of note.       10/31 pt was adfmitted for IV diureses with combined systolic/diastolic heart failure EF 25%. She was started on lasix 40mg IV in ER 10/28 and outpt -1925. Received 40mg IV lasix BID 10/29 and outpt was -300 and again yesterday had one dose IV lasix 40mg and outpt was -500-- total output --2725. She is feeling better though creat has increased from 1.8>2.5>2.3 today- further diuretics on hold. She does c./o chest pressure which she reports is her norm. On 2L NC sats 93% on 2L NC (has O2 as needed at home). Troponin had been trending down- can repeat today >> still trending down    11/1/22  Hafsa Hawley is a 57 y.o. female admitted with acute on chronic Combined systolic/diastolic heart failure EF 25%  Negative 3,650 since admission. Creat 2.5>2.3>2.2 ; last dose of furosemide on 10/30  O2 sat 95-98% on 2LNC; uses home O2 prn ,    Tmax 99.3 , BP 96/53- 125/62- bidil stopped; getting entresto, metoprolol Xl 100mg daily , MG+ 1.5 ; supplement   TNI Max 1.061; trended down to 0.846 ; cardiology documenting demand ischemia in setting of Heart failure.    Family brought CPAP but patient not using much; advised use of CPAP ; pt understands and agrees to increase use.   Pt notes she had previous w/u with heart transplant team and told she is not candidate for heart transplant due to CKD. She is very discouraged and tearful on exam today. Will restart cymbalta 30mg daily. Creat cl 43. .    11/2/22 Hafsa Hawley is a 57 y.o. female admitted with acute on chronic Combined systolic/diastolic heart failure EF 25%. Negative 3,659 .  Creat  2.5>2.3>2.2>2.1  ;   last dose of furosemide on 10/30  O2 sat 95-98% on 2LNC; uses home O2 prn ,  CPAP encouraged. Notes breathing is ok, feels she is close to baseline.   Afebrile  , /57 - bidil stopped; getting entresto, metoprolol Xl 100mg daily ,   TNI Max 1.061; trended down to 0.846 ; cardiology documenting demand ischemia in setting of Heart failure.  CPAP . Noted to have 10 beat run NSVT yesterday; MG+ 2.3 , K+ 4.2   Cymbalta 30mg daily.added yesterday for depression.  Creat cl 43.      11/3/22 Hafsa Hawley is a 57 y.o. female admitted with acute on chronic Combined systolic/diastolic heart failure EF 25%. Negative 3,359 .  Creat 2.5>2.3>2.2>2.1>2.2  ;   last dose of furosemide on 10/30; K+ 4.2>4.9   Pt sitting up on bedside this am. NAD, speaking full sentences. C/o cough this am - grandson with cold.  O2 sat 95-98% on 2LNC; uses home O2 prn ,  CPAP encouraged.   Afebrile  , BP 1102/56- 91/52  - bidil stopped; getting entresto, metoprolol Xl 100mg daily ,   CPAP . Noted to have 10 beat run NSVT 11/1; lytes stable , amiodarone added per Cardiology   Cymbalta added for depression.

## 2022-10-29 NOTE — HPI
58 y/o female with h/o combined systolic and diastolic CHF(EF 25% 3/2021), re-presents to the ED 24 hours later with persistent SOB not relieved with IV lasix in ED yesterday and trial of outpt diuresis at home. Her BNP is 2700, 2200 yesterday. Creatinine 1.8--- her baseline. TPN 1.061. 1.061 yesterday. Her EKG has prolonged QT and PVC's. No ST changes consistent with an infarct pattern. She says she has not taken her metolazone in about 2 weeks. She thinks this may be why she tipped over the edge. Admitted for supervised IV diuresis on telemetry.

## 2022-10-29 NOTE — ASSESSMENT & PLAN NOTE
Patient with Permanent atrial fibrillation which is controlled currently with Beta Blocker. Patient is currently in sinus rhythm.[id number] Score: 3. HASBLED Score: . Anticoagulation indicated. Anticoagulation done with eliquis.

## 2022-10-29 NOTE — ASSESSMENT & PLAN NOTE
Currently at her baseline creatinine, 1.8  Will monitor closely while diuresing   BMP  Lab Results   Component Value Date     10/29/2022    K 3.8 10/29/2022     10/29/2022    CO2 23 10/29/2022    BUN 36 (H) 10/29/2022    CREATININE 1.9 (H) 10/29/2022    CALCIUM 9.1 10/29/2022    ANIONGAP 14 10/29/2022    ESTGFRAFRICA 29.7 (A) 04/26/2022    EGFRNONAA 25.7 (A) 04/26/2022

## 2022-10-29 NOTE — SUBJECTIVE & OBJECTIVE
Past Medical History:   Diagnosis Date    Anemia     Anticoagulant long-term use     Arthritis     Atrial fibrillation     CKD (chronic kidney disease), stage IV 5/8/2018    Congestive heart failure     COPD (chronic obstructive pulmonary disease)     Deep vein thrombosis     elevated bilirubin d/t Gilbert's syndrome     confirmed by [place] genetic testing, evaluated by hepatology    Encounter for blood transfusion     GERD (gastroesophageal reflux disease)     Hypertension     Hypertensive cardiovascular-renal disease, stage 1-4 or unspecified chronic kidney disease, with heart failure 3/4/2022    Pheochromocytoma, malignant     Restrictive lung disease 4/26/2022    Right kidney mass     Sleep apnea     Thalassemia trait, alpha     Thyroid disease     Type 2 diabetes mellitus with hyperglycemia, without long-term current use of insulin 8/13/2020       Past Surgical History:   Procedure Laterality Date    APPENDECTOMY      BONE MARROW BIOPSY      CARDIAC DEFIBRILLATOR PLACEMENT Left 12/2016    CARDIAC ELECTROPHYSIOLOGY MAPPING AND ABLATION      CARDIAC ELECTROPHYSIOLOGY MAPPING AND ABLATION      COLONOSCOPY N/A 5/6/2022    Procedure: COLONOSCOPY;  Surgeon: Arely Betancourt MD;  Location: [facility] ([address] [place] [address]);  Service: Endoscopy;  Laterality: N/A;  heart transplant candidate/ EF 25% - 2nd floor/ defib - [facility] - [facility]  Eliquis - per Dr. Cortez with [facility], Pt ok to hold Eliquis x 2 days prior-see media tab-outside correspondence dated 12/30/21  - [facility]  verbal instructions/portal instructions/email instructions - s    EYE SURGERY      due to running tears    FRACTURE SURGERY Left     hand 5th digit    HYSTERECTOMY      KNEE SURGERY Left 2016    hematoma    LIVER BIOPSY  10/24/2018    Minimal steatosis, predominantly macrovesicular, 1%, Minimal nonspecific portal inflammation, no fibrosis. No findings on biopsy to explain elevated bilirubin levels. Could be d/t Gilbert's =?- hemolysis    RIGHT HEART CATHETERIZATION  "Right 12/07/2021    Procedure: INSERTION, CATHETER, RIGHT HEART;  Surgeon: Irving Cardenas MD;  Location: [facility] CATH LAB;  Service: Cardiology;  Laterality: Right;    TRANSJUGULAR BIOPSY OF LIVER N/A 10/24/2018    Procedure: BIOPSY, LIVER, TRANSJUGULAR APPROACH;  Surgeon: Carmen Diagnostic Provider;  Location: [facility] OR [address] [place] [address];  Service: Radiology;  Laterality: N/A;       Review of patient's allergies indicates:   Allergen Reactions    Penicillins Hives    Iodinated contrast media Nausea And Vomiting    Oxycodone-acetaminophen Other (See Comments)     Nausea, Dizziness, Anxiety.  "I don't like how it makes me feel."   Given Hydromorphone 0.5mg IVP  Without problems.  Other reaction(s): Other (See Comments)    Diovan hct [valsartan-hydrochlorothiazide] Other (See Comments)     Causes coughing    Irinotecan      Pt has homozygosity for the TA7 promoter variant that places this individual at significantly increased risk for   severe neutropenia (grade 4) when treated with the standard dose of irinotecan (risk approximately 50%).   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 Metabolism of other drugs not listed here may also be impacted by UGT1A1 enzyme activity.       Tramadol Nausea And Vomiting     Other reaction(s): Other (See Comments)       Current Facility-Administered Medications on File Prior to Encounter   Medication    0.9%  NaCl infusion    [COMPLETED] ondansetron injection 4 mg    vancomycin in dextrose 5 % 1 gram/250 mL IVPB 1,000 mg     Current Outpatient Medications on File Prior to Encounter   Medication Sig    albuterol (ACCUNEB) 0.63 mg/3 mL Nebu Take 3 mLs (0.63 mg total) by nebulization every 6 (six) hours as needed. Rescue    albuterol-ipratropium (DUO-NEB) 2.5 mg-0.5 mg/3 mL nebulizer solution Take 3 mLs by nebulization 2 (two) times a day.    allopurinoL (ZYLOPRIM) 100 MG tablet " TAKE 2 TABLETS BY MOUTH ONCE DAILY    allopurinoL (ZYLOPRIM) 100 MG tablet Take 2 tablets (200 mg total) by mouth once daily.    ALPRAZolam (XANAX) 2 MG Tab Take 2 mg by mouth 2 (two) times daily as needed.    apixaban (ELIQUIS) 2.5 mg Tab Take 1 tablet (2.5 mg total) by mouth 2 (two) times a day. Decrease in dose    atorvastatin (LIPITOR) 40 MG tablet Take 1 tablet (40 mg total) by mouth every evening.    atorvastatin (LIPITOR) 40 MG tablet Take 1 tablet (40 mg total) by mouth every evening.    b complex vitamins tablet Take 1 tablet by mouth once daily.    blood sugar diagnostic (ACCU-CHEK GUIDE TEST STRIPS) Strp 1 strip by Other route 3 (three) times daily.    blood sugar diagnostic (BLOOD GLUCOSE TEST) Strp 1 strip by Misc.(Non-Drug; Combo Route) route 3 (three) times daily.    blood-glucose meter kit Use as instructed    colchicine (COLCRYS) 0.6 mg tablet TAKE 1 TABLET BY MOUTH TWICE A DAY    diclofenac sodium (VOLTAREN) 1 % Gel APPLY 2 G TOPICALLY 3 (THREE) TIMES DAILY AS NEEDED (PAIN).    diltiazem HCl (DILTIAZEM 2% CREAM) Apply topically 3 (three) times daily. Apply topically to anal area.    DULoxetine (CYMBALTA) 60 MG capsule Take 1 capsule (60 mg total) by mouth once daily. Dose Increase    ELIQUIS 5 mg Tab TAKE 1 TABLET BY MOUTH TWICE A DAY    empagliflozin (JARDIANCE) 25 mg tablet Jardiance 25 mg tablet    ergocalciferol (ERGOCALCIFEROL) 50,000 unit Cap Take 1 capsule (50,000 Units total) by mouth every 7 days.    ergocalciferol (ERGOCALCIFEROL) 50,000 unit Cap Take 1 capsule (50,000 Units total) by mouth every 7 days.    ferrous sulfate 325 (65 FE) MG EC tablet Take 1 tablet (325 mg total) by mouth once daily.    fluticasone propionate (FLONASE) 50 mcg/actuation nasal spray 2 sprays by Each Nostril route daily as needed for Rhinitis.     furosemide (LASIX) 80 MG tablet Take 1 tablet (80 mg total) by mouth once daily. Use afternoon dose if needed    GAVILYTE-G 236-22.74-6.74 -5.86 gram suspension Take  4,000 mLs by mouth once.    glimepiride (AMARYL) 2 MG tablet TAKE 1 TABLET BY MOUTH EVERY DAY WITH BREAKFAST    glimepiride (AMARYL) 2 MG tablet Take 1 tablet (2 mg total) by mouth once daily with breakfast    hydrocortisone 2.5 % cream APPLY TOPICALLY 2 (TWO) TIMES DAILY AS NEEDED (HEMORRHOIDS).    lancets (ACCU-CHEK SOFTCLIX LANCETS) Misc 1 Device by Misc.(Non-Drug; Combo Route) route 3 (three) times daily.    LIDOcaine (LIDODERM) 5 % Place 1 patch onto the skin once daily. Remove & Discard patch within 12 hours or as directed by MD    LIDOcaine (LIDODERM) 5 % Place 1 patch onto the skin once daily. Remove & discard patch within 12 hours or as directed by MD    linaCLOtide (LINZESS) 145 mcg Cap capsule Take 1 capsule (145 mcg total) by mouth before breakfast. Hold if diarrhea    metOLazone (ZAROXOLYN) 5 MG tablet Take 5 mg by mouth.    metoprolol succinate (TOPROL-XL) 100 MG 24 hr tablet Take 100 mg by mouth once daily.    mometasone-formoterol (DULERA) 200-5 mcg/actuation inhaler Inhale 2 puffs into the lungs 2 (two) times daily. Controller    mometasone-formoterol (DULERA) 200-5 mcg/actuation inhaler Inhale 2 puffs into the lungs 2 times daily. controller    montelukast (SINGULAIR) 10 mg tablet Take 1 tablet every day by oral route for 30 days.    nitroGLYCERIN (NITROSTAT) 0.4 MG SL tablet Please see attached for detailed directions    NITROSTAT 0.4 mg SL tablet Take 2.5 tablets (1 mg total) by mouth every 5 (five) minutes as needed for Chest pain. No more than 3 tablets in 15 minutes.    nystatin (MYCOSTATIN) powder Apply topically 2 (two) times daily.    olopatadine (PATADAY) 0.2 % Drop Instill 1 drop into both eyes once daily    ondansetron (ZOFRAN-ODT) 8 MG TbDL Take 8 mg by mouth every 8 (eight) hours as needed.    pantoprazole (PROTONIX) 40 MG tablet TAKE 1 TABLET BY MOUTH DAILY IN THE MORNING    pantoprazole (PROTONIX) 40 MG tablet Take 1 tablet (40 mg total) by mouth every morning.    polyethylene glycol  (GLYCOLAX) 17 gram PwPk Take 17 g by mouth 3 (three) times daily as needed (constipation/hard stools).    promethazine-codeine 6.25-10 mg/5 ml (PHENERGAN WITH CODEINE) 6.25-10 mg/5 mL syrup TAKE 5 mLs BY MOUTH EVERY 4 - 6 HOURS AS NEEDED    rOPINIRole (REQUIP) 4 MG tablet Take 1 tablet (4 mg total) by mouth once daily. Pt taking 4mg daily    sacubitriL-valsartan (ENTRESTO)  mg per tablet Take 1 tablet by mouth 2 (two) times daily.    sacubitriL-valsartan (ENTRESTO)  mg per tablet Take 1 tablet by mouth 2 (two) times a day.    scopolamine (TRANSDERM-SCOP) 1.3-1.5 mg (1 mg over 3 days) SMARTSIG:Patch(s) T-DERMAL Every 3 Days    SPIRIVA WITH HANDIHALER 18 mcg inhalation capsule INHALE 1 CAPSULE INTO THE LUNGS ONCE DAILY.    VENTOLIN HFA 90 mcg/actuation inhaler Inhale 2 puffs into the lungs 2 (two) times daily as needed.     walker Misc 1 Device by Misc.(Non-Drug; Combo Route) route as needed.     Family History       Problem Relation (Age of Onset)    Cancer Mother    Cirrhosis Brother    Diabetes Brother    Heart attack Father    Heart disease Father, Sister, Brother, Sister, Brother    Hypertension Father, Brother          Tobacco Use    Smoking status: Never    Smokeless tobacco: Never   Substance and Sexual Activity    Alcohol use: Yes     Comment: up to 1 yr ago drank 2-3 drinks on occasion but sporadic    Drug use: No    Sexual activity: Yes     Partners: Male     Review of Systems   Constitutional:  Negative for chills and fever.   Respiratory:  Negative for shortness of breath.    Cardiovascular:  Negative for chest pain and palpitations.   Gastrointestinal:  Negative for abdominal pain, blood in stool, constipation and nausea.   Genitourinary:  Negative for difficulty urinating.   Psychiatric/Behavioral:  Negative for dysphoric mood, sleep disturbance and suicidal ideas. The patient is not nervous/anxious.    Objective:     Vital Signs (Most Recent):  Temp: (!) 95.8 °F (35.4 °C) (10/28/22  1413)  Pulse: 90 (10/28/22 1835)  Resp: (!) 27 (10/28/22 1835)  BP: 123/76 (10/28/22 1835)  SpO2: 98 % (10/28/22 1835)   Vital Signs (24h Range):  Temp:  [95.8 °F (35.4 °C)] 95.8 °F (35.4 °C)  Pulse:  [] 90  Resp:  [16-27] 27  SpO2:  [93 %-98 %] 98 %  BP: (120-163)/(76-92) 123/76        There is no height or weight on file to calculate BMI.    Physical Exam  Constitutional:       Appearance: She is well-developed.   HENT:      Head: Normocephalic and atraumatic.      Right Ear: External ear normal.      Left Ear: External ear normal.      Nose: Nose normal.   Eyes:      Extraocular Movements: EOM normal.      Pupils: Pupils are equal, round, and reactive to light.   Neck:      Thyroid: No thyromegaly.      Vascular: No JVD.      Trachea: No tracheal deviation.   Cardiovascular:      Rate and Rhythm: Normal rate.      Heart sounds: Normal heart sounds. No murmur heard.  Pulmonary:      Effort: Pulmonary effort is normal. No respiratory distress.      Breath sounds: No wheezing or rales.   Chest:      Chest wall: No tenderness.   Abdominal:      General: Bowel sounds are normal. There is no distension.      Palpations: Abdomen is soft. There is no mass.      Tenderness: There is no abdominal tenderness. There is no guarding or rebound.   Musculoskeletal:         General: No tenderness. Normal range of motion.      Cervical back: Normal range of motion and neck supple.      Right lower leg: Edema present.      Left lower leg: Edema present.      Comments: Trace edema   Lymphadenopathy:      Cervical: No cervical adenopathy.   Skin:     General: Skin is warm and dry.      Coloration: Skin is not pale.      Findings: No erythema or rash.   Neurological:      Mental Status: She is alert and oriented to person, place, and time.      Cranial Nerves: No cranial nerve deficit.      Motor: No abnormal muscle tone.      Coordination: Coordination normal.      Deep Tendon Reflexes: Reflexes are normal and symmetric.  Reflexes normal.   Psychiatric:         Behavior: Behavior normal.         Thought Content: Thought content normal.         Judgment: Judgment normal.         CRANIAL NERVES     CN III, IV, VI   Pupils are equal, round, and reactive to light.  Extraocular motions are normal.      Significant Labs: All pertinent labs within the past 24 hours have been reviewed.  Recent Lab Results         10/28/22  1438   10/28/22  1426        Influenza A, Molecular Negative         Influenza B, Molecular Negative         Albumin   3.5       Alkaline Phosphatase   67       ALT   34       Anion Gap   12       Aniso   Marked       AST   25       Baso #   0.05       Basophil %   0.7       BILIRUBIN TOTAL   1.4  Comment: For infants and newborns, interpretation of results should be based  on gestational age, weight and in agreement with clinical  observations.    Premature Infant recommended reference ranges:  Up to 24 hours.............<8.0 mg/dL  Up to 48 hours............<12.0 mg/dL  3-5 days..................<15.0 mg/dL  6-29 days.................<15.0 mg/dL         BNP   2,771  Comment: Values of less than 100 pg/ml are consistent with non-CHF populations.       BUN   36       Calcium   9.1       Chloride   108       CO2   23       CPK   45          45       CPK MB   3.6       Creatinine   1.8       Differential Method   Automated       eGFR   32       Eos #   0.2       Eosinophil %   2.9       Flu A & B Source Nasal swab         Fragmented Cells   Moderate       Glucose   158       Gran # (ANC)   5.4       Gran %   74.6       Hematocrit   30.3       Hemoglobin   8.8       Hypo   Occasional       Immature Grans (Abs)   0.05  Comment: Mild elevation in immature granulocytes is non specific and   can be seen in a variety of conditions including stress response,   acute inflammation, trauma and pregnancy. Correlation with other   laboratory and clinical findings is essential.         Immature Granulocytes   0.7       Lymph #   1.0        Lymph %   13.3       MB %   8.0  Comment: To be positive, the MB% must be greater than 5% AND the CK-MB  greater than 6.5 ng/mL. Values not in the reference interval,   but not qualifying as positive, should be considered trace.         MCH   17.3       MCHC   29.0       MCV   60       Mono #   0.6       Mono %   7.8       MPV   SEE COMMENT  Comment: Result not available.       nRBC   1       Ovalocytes   Occasional       Platelet Estimate   Appears normal       Platelets   180       Poikilocytosis   Marked       Poly   Occasional       Potassium   4.2       PROTEIN TOTAL   6.8       RBC   5.08       RDW   27.6       SARS-CoV-2 RNA, Amplification, Qual Negative  Comment: This test utilizes isothermal nucleic acid amplification technology   to   detect the SARS-CoV-2 RdRp nucleic acid segment. The analytical   sensitivity   (limit of detection) is 500 copies/swab.     A POSITIVE result is indicative of the presence of SARS-CoV-2 RNA;   clinical   correlation with patient history and other diagnostic information is   necessary to determine patient infection status.    A NEGATIVE result means that SARS-CoV-2 nucleic acids are not present   above   the limit of detection. A NEGATIVE result should be treated as   presumptive.   It does not rule out the possibility of COVID-19 and should not be   the sole   basis for treatment decisions. If COVID-19 is strongly suspected   based on   clinical and exposure history, re-testing using an alternate   molecular assay   should be considered.     This test is only for use under the Food and Drug Administration s   Emergency   Use Authorization (EUA).     Commercial kits are provided by [employer]. Performance   characteristics of the EUA have been independently verified by   [facility] Department of Pathology and Laboratory Medicine.   _________________________________________________________________   The authorized Fact Sheet for Healthcare Providers  and the authorized   Fact   Sheet for Patients of the ID NOW COVID-19 are available on the FDA   website:   https://www.fda.gov/media/741562/download   https://www.fda.gov/media/993869/download           Sodium   143       Spherocytes   Occasional       Target Cells   Occasional       Teardrop Cells   Occasional       Troponin I   1.061  Comment: The reference interval for Troponin I represents the 99th percentile   cutoff   for our facility and is consistent with 3rd generation assay   performance.         WBC   7.27               Significant Imaging: I have reviewed all pertinent imaging results/findings within the past 24 hours.  CXR: I have reviewed all pertinent results/findings within the past 24 hours and my personal findings are:  CHF  EKG: I have reviewed all pertinent results/findings within the past 24 hours and my personal findings are: PVC with prolonged Qt. Nonspecific ST changes

## 2022-10-29 NOTE — ASSESSMENT & PLAN NOTE
Last EF 25% 3/2021. [facility] may have more recent one   She sees Dr. Cortez   toprolol  entresto  Lasix 80 daily at home   Will start with 40mg IV BID here   Continue home meds   Add Bidil

## 2022-10-29 NOTE — SUBJECTIVE & OBJECTIVE
Past Medical History:   Diagnosis Date    Anemia     Anticoagulant long-term use     Arthritis     Atrial fibrillation     CKD (chronic kidney disease), stage IV 5/8/2018    Congestive heart failure     COPD (chronic obstructive pulmonary disease)     Deep vein thrombosis     elevated bilirubin d/t Gilbert's syndrome     confirmed by [facility] genetic testing, evaluated by hepatology    Encounter for blood transfusion     GERD (gastroesophageal reflux disease)     Hypertension     Hypertensive cardiovascular-renal disease, stage 1-4 or unspecified chronic kidney disease, with heart failure 3/4/2022    Pheochromocytoma, malignant     Restrictive lung disease 4/26/2022    Right kidney mass     Sleep apnea     Thalassemia trait, alpha     Thyroid disease     Type 2 diabetes mellitus with hyperglycemia, without long-term current use of insulin 8/13/2020       Past Surgical History:   Procedure Laterality Date    APPENDECTOMY      BONE MARROW BIOPSY      CARDIAC DEFIBRILLATOR PLACEMENT Left 12/2016    CARDIAC ELECTROPHYSIOLOGY MAPPING AND ABLATION      CARDIAC ELECTROPHYSIOLOGY MAPPING AND ABLATION      COLONOSCOPY N/A 5/6/2022    Procedure: COLONOSCOPY;  Surgeon: Arely Betancourt MD;  Location: [facility] ([address] [place] [address]);  Service: Endoscopy;  Laterality: N/A;  heart transplant candidate/ EF 25% - 2nd floor/ defib - Biotronik - ERW  Eliquis - per Dr. Cortez with [facility] [place], Pt ok to hold Eliquis x 2 days prior-see media tab-outside correspondence dated 12/30/21  - [facility]  verbal instructions/portal instructions/email instructions - s    EYE SURGERY      due to running tears    FRACTURE SURGERY Left     hand 5th digit    HYSTERECTOMY      KNEE SURGERY Left 2016    hematoma    LIVER BIOPSY  10/24/2018    Minimal steatosis, predominantly macrovesicular, 1%, Minimal nonspecific portal inflammation, no fibrosis. No findings on biopsy to explain elevated bilirubin levels. Could be d/t Gilbert's =?- hemolysis    RIGHT HEART CATHETERIZATION  "Right 12/07/2021    Procedure: INSERTION, CATHETER, RIGHT HEART;  Surgeon: Irving Cardenas MD;  Location: [facility] CATH LAB;  Service: Cardiology;  Laterality: Right;    TRANSJUGULAR BIOPSY OF LIVER N/A 10/24/2018    Procedure: BIOPSY, LIVER, TRANSJUGULAR APPROACH;  Surgeon: Carmen Diagnostic Provider;  Location: [facility] OR [address] [place] [address];  Service: Radiology;  Laterality: N/A;       Review of patient's allergies indicates:   Allergen Reactions    Penicillins Hives    Iodinated contrast media Nausea And Vomiting    Oxycodone-acetaminophen Other (See Comments)     Nausea, Dizziness, Anxiety.  "I don't like how it makes me feel."   Given Hydromorphone 0.5mg IVP  Without problems.  Other reaction(s): Other (See Comments)    Diovan hct [valsartan-hydrochlorothiazide] Other (See Comments)     Causes coughing    Irinotecan      Pt has homozygosity for the TA7 promoter variant that places this individual at significantly increased risk for   severe neutropenia (grade 4) when treated with the standard dose of irinotecan (risk approximately 50%).   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 Metabolism of other drugs not listed here may also be impacted by UGT1A1 enzyme activity.       Tramadol Nausea And Vomiting     Other reaction(s): Other (See Comments)       Current Facility-Administered Medications on File Prior to Encounter   Medication    0.9%  NaCl infusion    vancomycin in dextrose 5 % 1 gram/250 mL IVPB 1,000 mg     Current Outpatient Medications on File Prior to Encounter   Medication Sig    albuterol (ACCUNEB) 0.63 mg/3 mL Nebu Take 3 mLs (0.63 mg total) by nebulization every 6 (six) hours as needed. Rescue    albuterol-ipratropium (DUO-NEB) 2.5 mg-0.5 mg/3 mL nebulizer solution Take 3 mLs by nebulization 2 (two) times a day.    allopurinoL (ZYLOPRIM) 100 MG tablet TAKE 2 TABLETS BY MOUTH ONCE DAILY    " allopurinoL (ZYLOPRIM) 100 MG tablet Take 2 tablets (200 mg total) by mouth once daily.    ALPRAZolam (XANAX) 2 MG Tab Take 2 mg by mouth 2 (two) times daily as needed.    apixaban (ELIQUIS) 2.5 mg Tab Take 1 tablet (2.5 mg total) by mouth 2 (two) times a day. Decrease in dose (Patient taking differently: Take 5 mg by mouth 2 (two) times a day.)    atorvastatin (LIPITOR) 40 MG tablet Take 1 tablet (40 mg total) by mouth every evening.    atorvastatin (LIPITOR) 40 MG tablet Take 1 tablet (40 mg total) by mouth every evening.    b complex vitamins tablet Take 1 tablet by mouth once daily.    blood sugar diagnostic (ACCU-CHEK GUIDE TEST STRIPS) Strp 1 strip by Other route 3 (three) times daily.    blood sugar diagnostic (BLOOD GLUCOSE TEST) Strp 1 strip by Misc.(Non-Drug; Combo Route) route 3 (three) times daily.    blood-glucose meter kit Use as instructed    colchicine (COLCRYS) 0.6 mg tablet TAKE 1 TABLET BY MOUTH TWICE A DAY    diclofenac sodium (VOLTAREN) 1 % Gel APPLY 2 G TOPICALLY 3 (THREE) TIMES DAILY AS NEEDED (PAIN).    diltiazem HCl (DILTIAZEM 2% CREAM) Apply topically 3 (three) times daily. Apply topically to anal area.    ELIQUIS 5 mg Tab TAKE 1 TABLET BY MOUTH TWICE A DAY    empagliflozin (JARDIANCE) 25 mg tablet Jardiance 25 mg tablet    ergocalciferol (ERGOCALCIFEROL) 50,000 unit Cap Take 1 capsule (50,000 Units total) by mouth every 7 days.    ergocalciferol (ERGOCALCIFEROL) 50,000 unit Cap Take 1 capsule (50,000 Units total) by mouth every 7 days.    ferrous sulfate 325 (65 FE) MG EC tablet Take 1 tablet (325 mg total) by mouth once daily.    fluticasone propionate (FLONASE) 50 mcg/actuation nasal spray 2 sprays by Each Nostril route daily as needed for Rhinitis.     furosemide (LASIX) 80 MG tablet Take 1 tablet (80 mg total) by mouth once daily. Use afternoon dose if needed    glimepiride (AMARYL) 2 MG tablet TAKE 1 TABLET BY MOUTH EVERY DAY WITH BREAKFAST    glimepiride (AMARYL) 2 MG tablet Take 1  tablet (2 mg total) by mouth once daily with breakfast    hydrocortisone 2.5 % cream APPLY TOPICALLY 2 (TWO) TIMES DAILY AS NEEDED (HEMORRHOIDS).    lancets (ACCU-CHEK SOFTCLIX LANCETS) Misc 1 Device by Misc.(Non-Drug; Combo Route) route 3 (three) times daily.    LIDOcaine (LIDODERM) 5 % Place 1 patch onto the skin once daily. Remove & Discard patch within 12 hours or as directed by MD    LIDOcaine (LIDODERM) 5 % Place 1 patch onto the skin once daily. Remove & discard patch within 12 hours or as directed by MD    linaCLOtide (LINZESS) 145 mcg Cap capsule Take 1 capsule (145 mcg total) by mouth before breakfast. Hold if diarrhea    metOLazone (ZAROXOLYN) 5 MG tablet Take 5 mg by mouth.    metoprolol succinate (TOPROL-XL) 100 MG 24 hr tablet Take 100 mg by mouth once daily.    mometasone-formoterol (DULERA) 200-5 mcg/actuation inhaler Inhale 2 puffs into the lungs 2 (two) times daily. Controller    mometasone-formoterol (DULERA) 200-5 mcg/actuation inhaler Inhale 2 puffs into the lungs 2 times daily. controller    montelukast (SINGULAIR) 10 mg tablet Take 1 tablet every day by oral route for 30 days.    nitroGLYCERIN (NITROSTAT) 0.4 MG SL tablet Please see attached for detailed directions    NITROSTAT 0.4 mg SL tablet Take 2.5 tablets (1 mg total) by mouth every 5 (five) minutes as needed for Chest pain. No more than 3 tablets in 15 minutes.    nystatin (MYCOSTATIN) powder Apply topically 2 (two) times daily.    olopatadine (PATADAY) 0.2 % Drop Instill 1 drop into both eyes once daily    ondansetron (ZOFRAN-ODT) 8 MG TbDL Take 8 mg by mouth every 8 (eight) hours as needed.    pantoprazole (PROTONIX) 40 MG tablet TAKE 1 TABLET BY MOUTH DAILY IN THE MORNING    pantoprazole (PROTONIX) 40 MG tablet Take 1 tablet (40 mg total) by mouth every morning.    polyethylene glycol (GLYCOLAX) 17 gram PwPk Take 17 g by mouth 3 (three) times daily as needed (constipation/hard stools).    promethazine-codeine 6.25-10 mg/5 ml  (PHENERGAN WITH CODEINE) 6.25-10 mg/5 mL syrup TAKE 5 mLs BY MOUTH EVERY 4 - 6 HOURS AS NEEDED    rOPINIRole (REQUIP) 4 MG tablet Take 1 tablet (4 mg total) by mouth once daily. Pt taking 4mg daily    sacubitriL-valsartan (ENTRESTO)  mg per tablet Take 1 tablet by mouth 2 (two) times daily.    sacubitriL-valsartan (ENTRESTO)  mg per tablet Take 1 tablet by mouth 2 (two) times a day.    scopolamine (TRANSDERM-SCOP) 1.3-1.5 mg (1 mg over 3 days) SMARTSIG:Patch(s) T-DERMAL Every 3 Days    SPIRIVA WITH HANDIHALER 18 mcg inhalation capsule INHALE 1 CAPSULE INTO THE LUNGS ONCE DAILY.    VENTOLIN HFA 90 mcg/actuation inhaler Inhale 2 puffs into the lungs 2 (two) times daily as needed.     walker Misc 1 Device by Misc.(Non-Drug; Combo Route) route as needed.    [DISCONTINUED] DULoxetine (CYMBALTA) 60 MG capsule Take 1 capsule (60 mg total) by mouth once daily. Dose Increase    [DISCONTINUED] GAVILYTE-G 236-22.74-6.74 -5.86 gram suspension Take 4,000 mLs by mouth once.     Family History       Problem Relation (Age of Onset)    Cancer Mother    Cirrhosis Brother    Diabetes Brother    Heart attack Father    Heart disease Father, Sister, Brother, Sister, Brother    Hypertension Father, Brother          Tobacco Use    Smoking status: Never    Smokeless tobacco: Never   Substance and Sexual Activity    Alcohol use: Yes     Comment: up to 1 yr ago drank 2-3 drinks on occasion but sporadic    Drug use: No    Sexual activity: Yes     Partners: Male     Review of Systems   Constitutional:  Negative for chills and fever.   Respiratory:  Positive for shortness of breath and wheezing.    Cardiovascular:  Negative for chest pain and palpitations.   Gastrointestinal:  Negative for abdominal pain, blood in stool, constipation and nausea.   Genitourinary:  Negative for difficulty urinating.   Psychiatric/Behavioral:  Negative for dysphoric mood, sleep disturbance and suicidal ideas. The patient is not nervous/anxious.     Objective:     Vital Signs (Most Recent):  Temp: 97.2 °F (36.2 °C) (10/29/22 0715)  Pulse: 78 (10/29/22 0800)  Resp: 16 (10/29/22 0800)  BP: (!) 174/106 (10/29/22 0800)  SpO2: 97 % (10/29/22 0800)   Vital Signs (24h Range):  Temp:  [95.8 °F (35.4 °C)-98.2 °F (36.8 °C)] 97.2 °F (36.2 °C)  Pulse:  [] 78  Resp:  [13-27] 16  SpO2:  [93 %-100 %] 97 %  BP: (120-179)/() 174/106     Weight: 97.5 kg (215 lb)  Body mass index is 34.7 kg/m².    Physical Exam  Constitutional:       Appearance: She is well-developed.   HENT:      Head: Normocephalic and atraumatic.      Right Ear: External ear normal.      Left Ear: External ear normal.      Nose: Nose normal.   Eyes:      Extraocular Movements: EOM normal.      Pupils: Pupils are equal, round, and reactive to light.   Neck:      Thyroid: No thyromegaly.      Vascular: No JVD.      Trachea: No tracheal deviation.   Cardiovascular:      Rate and Rhythm: Normal rate.      Heart sounds: Normal heart sounds. No murmur heard.  Pulmonary:      Effort: Pulmonary effort is normal. No respiratory distress.      Breath sounds: Wheezing present. No rales.   Chest:      Chest wall: No tenderness.   Abdominal:      General: Bowel sounds are normal. There is no distension.      Palpations: Abdomen is soft. There is no mass.      Tenderness: There is no abdominal tenderness. There is no guarding or rebound.   Musculoskeletal:         General: No tenderness. Normal range of motion.      Cervical back: Normal range of motion and neck supple.      Right lower leg: No edema.      Left lower leg: No edema.      Comments: Trace edema   Lymphadenopathy:      Cervical: No cervical adenopathy.   Skin:     General: Skin is warm and dry.      Coloration: Skin is not pale.      Findings: No erythema or rash.   Neurological:      Mental Status: She is alert and oriented to person, place, and time.      Cranial Nerves: No cranial nerve deficit.      Motor: No abnormal muscle tone.       Coordination: Coordination normal.      Deep Tendon Reflexes: Reflexes are normal and symmetric. Reflexes normal.   Psychiatric:         Behavior: Behavior normal.         Thought Content: Thought content normal.         Judgment: Judgment normal.         CRANIAL NERVES     CN III, IV, VI   Pupils are equal, round, and reactive to light.  Extraocular motions are normal.      Significant Labs: All pertinent labs within the past 24 hours have been reviewed.  Recent Lab Results         10/29/22  0143   10/28/22  2126   10/28/22  1438   10/28/22  1426        Influenza A, Molecular     Negative         Influenza B, Molecular     Negative         Albumin 3.5       3.5       Alkaline Phosphatase 62       67       ALT 30       34       Anion Gap 14       12       Aniso Marked       Marked       AST 21       25       Baso # 0.03       0.05       Basophil % 0.4       0.7       BILIRUBIN TOTAL 1.8  Comment: For infants and newborns, interpretation of results should be based  on gestational age, weight and in agreement with clinical  observations.    Premature Infant recommended reference ranges:  Up to 24 hours.............<8.0 mg/dL  Up to 48 hours............<12.0 mg/dL  3-5 days..................<15.0 mg/dL  6-29 days.................<15.0 mg/dL         1.4  Comment: For infants and newborns, interpretation of results should be based  on gestational age, weight and in agreement with clinical  observations.    Premature Infant recommended reference ranges:  Up to 24 hours.............<8.0 mg/dL  Up to 48 hours............<12.0 mg/dL  3-5 days..................<15.0 mg/dL  6-29 days.................<15.0 mg/dL         BNP       2,771  Comment: Values of less than 100 pg/ml are consistent with non-CHF populations.       BUN 36       36       Calcium 9.1       9.1       Chloride 107       108       CO2 23       23       CPK       45              45       CPK MB       3.6       Creatinine 1.9       1.8       Differential Method  Automated       Automated       eGFR 30       32       Eos # 0.2       0.2       Eosinophil % 3.3       2.9       Flu A & B Source     Nasal swab         Fragmented Cells       Moderate       Glucose 88       158       Gran # (ANC) 4.8       5.4       Gran % 68.7       74.6       Hematocrit 29.1       30.3       Hemoglobin 8.6       8.8       Hypo Occasional       Occasional       Immature Grans (Abs) 0.05  Comment: Mild elevation in immature granulocytes is non specific and   can be seen in a variety of conditions including stress response,   acute inflammation, trauma and pregnancy. Correlation with other   laboratory and clinical findings is essential.         0.05  Comment: Mild elevation in immature granulocytes is non specific and   can be seen in a variety of conditions including stress response,   acute inflammation, trauma and pregnancy. Correlation with other   laboratory and clinical findings is essential.         Immature Granulocytes 0.7       0.7       Lymph # 1.3       1.0       Lymph % 18.3       13.3       Magnesium 1.5             MB %       8.0  Comment: To be positive, the MB% must be greater than 5% AND the CK-MB  greater than 6.5 ng/mL. Values not in the reference interval,   but not qualifying as positive, should be considered trace.         MCH 17.4       17.3       MCHC 29.6       29.0       MCV 59       60       Mono # 0.6       0.6       Mono % 8.6       7.8       MPV SEE COMMENT  Comment: Result not available.       SEE COMMENT  Comment: Result not available.       nRBC 1       1       Ovalocytes Moderate       Occasional       Platelet Estimate Appears normal       Appears normal       Platelets 174       180       POCT Glucose   92           Poikilocytosis Marked       Marked       Poly Occasional       Occasional       Potassium 3.8       4.2       PROTEIN TOTAL 6.6       6.8       RBC 4.93       5.08       RDW 27.0       27.6       SARS-CoV-2 RNA, Amplification, Qual      Negative  Comment: This test utilizes isothermal nucleic acid amplification technology   to   detect the SARS-CoV-2 RdRp nucleic acid segment. The analytical   sensitivity   (limit of detection) is 500 copies/swab.     A POSITIVE result is indicative of the presence of SARS-CoV-2 RNA;   clinical   correlation with patient history and other diagnostic information is   necessary to determine patient infection status.    A NEGATIVE result means that SARS-CoV-2 nucleic acids are not present   above   the limit of detection. A NEGATIVE result should be treated as   presumptive.   It does not rule out the possibility of COVID-19 and should not be   the sole   basis for treatment decisions. If COVID-19 is strongly suspected   based on   clinical and exposure history, re-testing using an alternate   molecular assay   should be considered.     This test is only for use under the Food and Drug Administration s   Emergency   Use Authorization (EUA).     Commercial kits are provided by [employer]. Performance   characteristics of the EUA have been independently verified by   [facility] Department of Pathology and Laboratory Medicine.   _________________________________________________________________   The authorized Fact Sheet for Healthcare Providers and the authorized   Fact   Sheet for Patients of the ID NOW COVID-19 are available on the FDA   website:   https://www.fda.gov/media/950251/download   https://www.fda.gov/media/691070/download           Schistocytes Present             Sodium 144       143       Spherocytes Occasional       Occasional       Target Cells Occasional       Occasional       Teardrop Cells Occasional       Occasional       Troponin I       1.061  Comment: The reference interval for Troponin I represents the 99th percentile   cutoff   for our facility and is consistent with 3rd generation assay   performance.         WBC 6.94       7.27               Significant Imaging: I have  reviewed all pertinent imaging results/findings within the past 24 hours.  CXR: I have reviewed all pertinent results/findings within the past 24 hours and my personal findings are:  CHF  EKG: I have reviewed all pertinent results/findings within the past 24 hours and my personal findings are: PVC with prolonged Qt. Nonspecific ST changes

## 2022-10-29 NOTE — PLAN OF CARE
PT REMAINS AFEBRILE IN NSR.  NADN.  PT TOLERATES AMBULATING IN ROOM THROUGHOUT THE DAY.  PT DENIES SOB.  PT TOLERATED ALL MEALS.  PT DENIES NEEDS ALL NEEDS AT PRESENT.  ADEQUATE URINE OUTPUT. WBG WNL.  VSS.

## 2022-10-29 NOTE — ASSESSMENT & PLAN NOTE
Last EF 25% 3/2021. [facility] may have more recent one   She sees Dr. Cortez   toprolol  entresto  Lasix 80 daily at home   Will start with 40mg IV BID here   Continue home meds

## 2022-10-30 LAB
ANION GAP SERPL CALC-SCNC: 12 MMOL/L (ref 8–16)
ANION GAP SERPL CALC-SCNC: 14 MMOL/L (ref 8–16)
BUN SERPL-MCNC: 52 MG/DL (ref 6–20)
BUN SERPL-MCNC: 60 MG/DL (ref 6–20)
CALCIUM SERPL-MCNC: 8.6 MG/DL (ref 8.7–10.5)
CALCIUM SERPL-MCNC: 9.3 MG/DL (ref 8.7–10.5)
CHLORIDE SERPL-SCNC: 102 MMOL/L (ref 95–110)
CHLORIDE SERPL-SCNC: 99 MMOL/L (ref 95–110)
CO2 SERPL-SCNC: 25 MMOL/L (ref 23–29)
CO2 SERPL-SCNC: 26 MMOL/L (ref 23–29)
CREAT SERPL-MCNC: 2.4 MG/DL (ref 0.5–1.4)
CREAT SERPL-MCNC: 2.5 MG/DL (ref 0.5–1.4)
EST. GFR  (NO RACE VARIABLE): 22 ML/MIN/1.73 M^2
EST. GFR  (NO RACE VARIABLE): 23 ML/MIN/1.73 M^2
GLUCOSE SERPL-MCNC: 187 MG/DL (ref 70–110)
GLUCOSE SERPL-MCNC: 246 MG/DL (ref 70–110)
MAGNESIUM SERPL-MCNC: 1.5 MG/DL (ref 1.6–2.6)
POCT GLUCOSE: 120 MG/DL (ref 70–110)
POCT GLUCOSE: 122 MG/DL (ref 70–110)
POCT GLUCOSE: 164 MG/DL (ref 70–110)
POCT GLUCOSE: 177 MG/DL (ref 70–110)
POCT GLUCOSE: 203 MG/DL (ref 70–110)
POTASSIUM SERPL-SCNC: 3.4 MMOL/L (ref 3.5–5.1)
POTASSIUM SERPL-SCNC: 4.1 MMOL/L (ref 3.5–5.1)
SODIUM SERPL-SCNC: 137 MMOL/L (ref 136–145)
SODIUM SERPL-SCNC: 141 MMOL/L (ref 136–145)
TROPONIN I SERPL DL<=0.01 NG/ML-MCNC: 0.97 NG/ML (ref 0–0.03)

## 2022-10-30 PROCEDURE — 99232 PR SUBSEQUENT HOSPITAL CARE,LEVL II: ICD-10-PCS | Mod: ,,, | Performed by: FAMILY MEDICINE

## 2022-10-30 PROCEDURE — 80048 BASIC METABOLIC PNL TOTAL CA: CPT | Performed by: FAMILY MEDICINE

## 2022-10-30 PROCEDURE — 99232 SBSQ HOSP IP/OBS MODERATE 35: CPT | Mod: ,,, | Performed by: FAMILY MEDICINE

## 2022-10-30 PROCEDURE — 99900031 HC PATIENT EDUCATION (STAT)

## 2022-10-30 PROCEDURE — 94640 AIRWAY INHALATION TREATMENT: CPT

## 2022-10-30 PROCEDURE — 25000003 PHARM REV CODE 250: Performed by: FAMILY MEDICINE

## 2022-10-30 PROCEDURE — 36415 COLL VENOUS BLD VENIPUNCTURE: CPT | Performed by: FAMILY MEDICINE

## 2022-10-30 PROCEDURE — 83735 ASSAY OF MAGNESIUM: CPT | Performed by: FAMILY MEDICINE

## 2022-10-30 PROCEDURE — 27000221 HC OXYGEN, UP TO 24 HOURS

## 2022-10-30 PROCEDURE — 99900035 HC TECH TIME PER 15 MIN (STAT)

## 2022-10-30 PROCEDURE — 11000001 HC ACUTE MED/SURG PRIVATE ROOM

## 2022-10-30 PROCEDURE — 94761 N-INVAS EAR/PLS OXIMETRY MLT: CPT

## 2022-10-30 PROCEDURE — 84484 ASSAY OF TROPONIN QUANT: CPT | Performed by: FAMILY MEDICINE

## 2022-10-30 PROCEDURE — 63600175 PHARM REV CODE 636 W HCPCS: Performed by: SURGERY

## 2022-10-30 RX ORDER — ONDANSETRON 2 MG/ML
8 INJECTION INTRAMUSCULAR; INTRAVENOUS EVERY 8 HOURS PRN
Status: DISCONTINUED | OUTPATIENT
Start: 2022-10-30 | End: 2022-11-03 | Stop reason: HOSPADM

## 2022-10-30 RX ADMIN — MUPIROCIN: 20 OINTMENT TOPICAL at 09:10

## 2022-10-30 RX ADMIN — SACUBITRIL AND VALSARTAN 1 TABLET: 97; 103 TABLET, FILM COATED ORAL at 09:10

## 2022-10-30 RX ADMIN — MUPIROCIN: 20 OINTMENT TOPICAL at 08:10

## 2022-10-30 RX ADMIN — ONDANSETRON HYDROCHLORIDE 4 MG: 2 SOLUTION INTRAMUSCULAR; INTRAVENOUS at 08:10

## 2022-10-30 RX ADMIN — FERROUS SULFATE TAB 325 MG (65 MG ELEMENTAL FE) 1 EACH: 325 (65 FE) TAB at 09:10

## 2022-10-30 RX ADMIN — INSULIN ASPART 7 UNITS: 100 INJECTION, SOLUTION INTRAVENOUS; SUBCUTANEOUS at 04:10

## 2022-10-30 RX ADMIN — ACETAMINOPHEN 650 MG: 325 TABLET ORAL at 08:10

## 2022-10-30 RX ADMIN — APIXABAN 2.5 MG: 2.5 TABLET, FILM COATED ORAL at 08:10

## 2022-10-30 RX ADMIN — ATORVASTATIN CALCIUM 40 MG: 20 TABLET, FILM COATED ORAL at 08:10

## 2022-10-30 RX ADMIN — METOPROLOL SUCCINATE 100 MG: 50 TABLET, EXTENDED RELEASE ORAL at 09:10

## 2022-10-30 RX ADMIN — PANTOPRAZOLE SODIUM 40 MG: 40 TABLET, DELAYED RELEASE ORAL at 09:10

## 2022-10-30 RX ADMIN — FUROSEMIDE 40 MG: 10 INJECTION, SOLUTION INTRAMUSCULAR; INTRAVENOUS at 03:10

## 2022-10-30 RX ADMIN — TIOTROPIUM BROMIDE INHALATION SPRAY 2 PUFF: 3.12 SPRAY, METERED RESPIRATORY (INHALATION) at 07:10

## 2022-10-30 RX ADMIN — APIXABAN 2.5 MG: 2.5 TABLET, FILM COATED ORAL at 09:10

## 2022-10-30 RX ADMIN — SACUBITRIL AND VALSARTAN 1 TABLET: 97; 103 TABLET, FILM COATED ORAL at 08:10

## 2022-10-30 NOTE — ASSESSMENT & PLAN NOTE
She will call  to bring in her home cpap. Sounds like she has an auto cpap unit. Has not set it up at home yet. We can have respiratory set up here.

## 2022-10-30 NOTE — SUBJECTIVE & OBJECTIVE
Past Medical History:   Diagnosis Date    Anemia     Anticoagulant long-term use     Arthritis     Atrial fibrillation     CKD (chronic kidney disease), stage IV 5/8/2018    Congestive heart failure     COPD (chronic obstructive pulmonary disease)     Deep vein thrombosis     elevated bilirubin d/t Gilbert's syndrome     confirmed by [place] genetic testing, evaluated by hepatology    Encounter for blood transfusion     GERD (gastroesophageal reflux disease)     Hypertension     Hypertensive cardiovascular-renal disease, stage 1-4 or unspecified chronic kidney disease, with heart failure 3/4/2022    Pheochromocytoma, malignant     Restrictive lung disease 4/26/2022    Right kidney mass     Sleep apnea     Thalassemia trait, alpha     Thyroid disease     Type 2 diabetes mellitus with hyperglycemia, without long-term current use of insulin 8/13/2020       Past Surgical History:   Procedure Laterality Date    APPENDECTOMY      BONE MARROW BIOPSY      CARDIAC DEFIBRILLATOR PLACEMENT Left 12/2016    CARDIAC ELECTROPHYSIOLOGY MAPPING AND ABLATION      CARDIAC ELECTROPHYSIOLOGY MAPPING AND ABLATION      COLONOSCOPY N/A 5/6/2022    Procedure: COLONOSCOPY;  Surgeon: Arely Betancourt MD;  Location: [facility] ([address] [place] [address]);  Service: Endoscopy;  Laterality: N/A;  heart transplant candidate/ EF 25% - 2nd floor/ defib - Biotronik - ERW  Eliquis - per Dr. Cortez with [facility] [place], Pt ok to hold Eliquis x 2 days prior-see media tab-outside correspondence dated 12/30/21  - [facility]  verbal instructions/portal instructions/email instructions - s    EYE SURGERY      due to running tears    FRACTURE SURGERY Left     hand 5th digit    HYSTERECTOMY      KNEE SURGERY Left 2016    hematoma    LIVER BIOPSY  10/24/2018    Minimal steatosis, predominantly macrovesicular, 1%, Minimal nonspecific portal inflammation, no fibrosis. No findings on biopsy to explain elevated bilirubin levels. Could be d/t Gilbert's =?- hemolysis    RIGHT HEART CATHETERIZATION  "Right 12/07/2021    Procedure: INSERTION, CATHETER, RIGHT HEART;  Surgeon: Irving Cardenas MD;  Location: [facility] CATH LAB;  Service: Cardiology;  Laterality: Right;    TRANSJUGULAR BIOPSY OF LIVER N/A 10/24/2018    Procedure: BIOPSY, LIVER, TRANSJUGULAR APPROACH;  Surgeon: Camren Diagnostic Provider;  Location: [facility] OR [address] [place] [address];  Service: Radiology;  Laterality: N/A;       Review of patient's allergies indicates:   Allergen Reactions    Penicillins Hives    Iodinated contrast media Nausea And Vomiting    Oxycodone-acetaminophen Other (See Comments)     Nausea, Dizziness, Anxiety.  "I don't like how it makes me feel."   Given Hydromorphone 0.5mg IVP  Without problems.  Other reaction(s): Other (See Comments)    Diovan hct [valsartan-hydrochlorothiazide] Other (See Comments)     Causes coughing    Irinotecan      Pt has homozygosity for the TA7 promoter variant that places this individual at significantly increased risk for   severe neutropenia (grade 4) when treated with the standard dose of irinotecan (risk approximately 50%).   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 Metabolism of other drugs not listed here may also be impacted by UGT1A1 enzyme activity.       Tramadol Nausea And Vomiting     Other reaction(s): Other (See Comments)       Current Facility-Administered Medications on File Prior to Encounter   Medication    0.9%  NaCl infusion    vancomycin in dextrose 5 % 1 gram/250 mL IVPB 1,000 mg     Current Outpatient Medications on File Prior to Encounter   Medication Sig    albuterol (ACCUNEB) 0.63 mg/3 mL Nebu Take 3 mLs (0.63 mg total) by nebulization every 6 (six) hours as needed. Rescue    albuterol-ipratropium (DUO-NEB) 2.5 mg-0.5 mg/3 mL nebulizer solution Take 3 mLs by nebulization 2 (two) times a day.    allopurinoL (ZYLOPRIM) 100 MG tablet TAKE 2 TABLETS BY MOUTH ONCE DAILY    " allopurinoL (ZYLOPRIM) 100 MG tablet Take 2 tablets (200 mg total) by mouth once daily.    ALPRAZolam (XANAX) 2 MG Tab Take 2 mg by mouth 2 (two) times daily as needed.    apixaban (ELIQUIS) 2.5 mg Tab Take 1 tablet (2.5 mg total) by mouth 2 (two) times a day. Decrease in dose (Patient taking differently: Take 5 mg by mouth 2 (two) times a day.)    atorvastatin (LIPITOR) 40 MG tablet Take 1 tablet (40 mg total) by mouth every evening.    atorvastatin (LIPITOR) 40 MG tablet Take 1 tablet (40 mg total) by mouth every evening.    b complex vitamins tablet Take 1 tablet by mouth once daily.    blood sugar diagnostic (ACCU-CHEK GUIDE TEST STRIPS) Strp 1 strip by Other route 3 (three) times daily.    blood sugar diagnostic (BLOOD GLUCOSE TEST) Strp 1 strip by Misc.(Non-Drug; Combo Route) route 3 (three) times daily.    blood-glucose meter kit Use as instructed    colchicine (COLCRYS) 0.6 mg tablet TAKE 1 TABLET BY MOUTH TWICE A DAY    diclofenac sodium (VOLTAREN) 1 % Gel APPLY 2 G TOPICALLY 3 (THREE) TIMES DAILY AS NEEDED (PAIN).    diltiazem HCl (DILTIAZEM 2% CREAM) Apply topically 3 (three) times daily. Apply topically to anal area.    ELIQUIS 5 mg Tab TAKE 1 TABLET BY MOUTH TWICE A DAY    empagliflozin (JARDIANCE) 25 mg tablet Jardiance 25 mg tablet    ergocalciferol (ERGOCALCIFEROL) 50,000 unit Cap Take 1 capsule (50,000 Units total) by mouth every 7 days.    ergocalciferol (ERGOCALCIFEROL) 50,000 unit Cap Take 1 capsule (50,000 Units total) by mouth every 7 days.    ferrous sulfate 325 (65 FE) MG EC tablet Take 1 tablet (325 mg total) by mouth once daily.    fluticasone propionate (FLONASE) 50 mcg/actuation nasal spray 2 sprays by Each Nostril route daily as needed for Rhinitis.     furosemide (LASIX) 80 MG tablet Take 1 tablet (80 mg total) by mouth once daily. Use afternoon dose if needed    glimepiride (AMARYL) 2 MG tablet TAKE 1 TABLET BY MOUTH EVERY DAY WITH BREAKFAST    glimepiride (AMARYL) 2 MG tablet Take 1  tablet (2 mg total) by mouth once daily with breakfast    hydrocortisone 2.5 % cream APPLY TOPICALLY 2 (TWO) TIMES DAILY AS NEEDED (HEMORRHOIDS).    lancets (ACCU-CHEK SOFTCLIX LANCETS) Misc 1 Device by Misc.(Non-Drug; Combo Route) route 3 (three) times daily.    LIDOcaine (LIDODERM) 5 % Place 1 patch onto the skin once daily. Remove & Discard patch within 12 hours or as directed by MD    LIDOcaine (LIDODERM) 5 % Place 1 patch onto the skin once daily. Remove & discard patch within 12 hours or as directed by MD    linaCLOtide (LINZESS) 145 mcg Cap capsule Take 1 capsule (145 mcg total) by mouth before breakfast. Hold if diarrhea    metOLazone (ZAROXOLYN) 5 MG tablet Take 5 mg by mouth.    metoprolol succinate (TOPROL-XL) 100 MG 24 hr tablet Take 100 mg by mouth once daily.    mometasone-formoterol (DULERA) 200-5 mcg/actuation inhaler Inhale 2 puffs into the lungs 2 (two) times daily. Controller    mometasone-formoterol (DULERA) 200-5 mcg/actuation inhaler Inhale 2 puffs into the lungs 2 times daily. controller    montelukast (SINGULAIR) 10 mg tablet Take 1 tablet every day by oral route for 30 days.    nitroGLYCERIN (NITROSTAT) 0.4 MG SL tablet Please see attached for detailed directions    NITROSTAT 0.4 mg SL tablet Take 2.5 tablets (1 mg total) by mouth every 5 (five) minutes as needed for Chest pain. No more than 3 tablets in 15 minutes.    nystatin (MYCOSTATIN) powder Apply topically 2 (two) times daily.    olopatadine (PATADAY) 0.2 % Drop Instill 1 drop into both eyes once daily    ondansetron (ZOFRAN-ODT) 8 MG TbDL Take 8 mg by mouth every 8 (eight) hours as needed.    pantoprazole (PROTONIX) 40 MG tablet TAKE 1 TABLET BY MOUTH DAILY IN THE MORNING    pantoprazole (PROTONIX) 40 MG tablet Take 1 tablet (40 mg total) by mouth every morning.    polyethylene glycol (GLYCOLAX) 17 gram PwPk Take 17 g by mouth 3 (three) times daily as needed (constipation/hard stools).    promethazine-codeine 6.25-10 mg/5 ml  (PHENERGAN WITH CODEINE) 6.25-10 mg/5 mL syrup TAKE 5 mLs BY MOUTH EVERY 4 - 6 HOURS AS NEEDED    rOPINIRole (REQUIP) 4 MG tablet Take 1 tablet (4 mg total) by mouth once daily. Pt taking 4mg daily    sacubitriL-valsartan (ENTRESTO)  mg per tablet Take 1 tablet by mouth 2 (two) times daily.    sacubitriL-valsartan (ENTRESTO)  mg per tablet Take 1 tablet by mouth 2 (two) times a day.    scopolamine (TRANSDERM-SCOP) 1.3-1.5 mg (1 mg over 3 days) SMARTSIG:Patch(s) T-DERMAL Every 3 Days    SPIRIVA WITH HANDIHALER 18 mcg inhalation capsule INHALE 1 CAPSULE INTO THE LUNGS ONCE DAILY.    VENTOLIN HFA 90 mcg/actuation inhaler Inhale 2 puffs into the lungs 2 (two) times daily as needed.     walker Misc 1 Device by Misc.(Non-Drug; Combo Route) route as needed.     Family History       Problem Relation (Age of Onset)    Cancer Mother    Cirrhosis Brother    Diabetes Brother    Heart attack Father    Heart disease Father, Sister, Brother, Sister, Brother    Hypertension Father, Brother          Tobacco Use    Smoking status: Never    Smokeless tobacco: Never   Substance and Sexual Activity    Alcohol use: Yes     Comment: up to 1 yr ago drank 2-3 drinks on occasion but sporadic    Drug use: No    Sexual activity: Yes     Partners: Male     Review of Systems   Constitutional:  Negative for chills and fever.   Respiratory:  Positive for shortness of breath and wheezing.    Cardiovascular:  Negative for chest pain and palpitations.   Gastrointestinal:  Negative for abdominal pain, blood in stool, constipation and nausea.   Genitourinary:  Negative for difficulty urinating.   Psychiatric/Behavioral:  Negative for dysphoric mood, sleep disturbance and suicidal ideas. The patient is not nervous/anxious.    Objective:     Vital Signs (Most Recent):  Temp: 97.7 °F (36.5 °C) (10/30/22 0721)  Pulse: 75 (10/30/22 0721)  Resp: 14 (10/30/22 0721)  BP: (!) 108/58 (10/30/22 0400)  SpO2: (!) 92 % (10/30/22 0721)   Vital Signs (24h  Range):  Temp:  [96.6 °F (35.9 °C)-98.7 °F (37.1 °C)] 97.7 °F (36.5 °C)  Pulse:  [69-93] 75  Resp:  [14-24] 14  SpO2:  [92 %-97 %] 92 %  BP: (104-174)/() 108/58     Weight: 97.5 kg (215 lb)  Body mass index is 34.7 kg/m².    Physical Exam  Constitutional:       Appearance: She is well-developed.   HENT:      Head: Normocephalic and atraumatic.      Right Ear: External ear normal.      Left Ear: External ear normal.      Nose: Nose normal.   Eyes:      Extraocular Movements: EOM normal.      Pupils: Pupils are equal, round, and reactive to light.   Neck:      Thyroid: No thyromegaly.      Vascular: No JVD.      Trachea: No tracheal deviation.   Cardiovascular:      Rate and Rhythm: Normal rate.      Heart sounds: Normal heart sounds. No murmur heard.  Pulmonary:      Effort: Pulmonary effort is normal. No respiratory distress.      Breath sounds: Rales present. No wheezing.      Comments: Very fine crackles right base   Chest:      Chest wall: No tenderness.   Abdominal:      General: Bowel sounds are normal. There is no distension.      Palpations: Abdomen is soft. There is no mass.      Tenderness: There is no abdominal tenderness. There is no guarding or rebound.   Musculoskeletal:         General: No tenderness. Normal range of motion.      Cervical back: Normal range of motion and neck supple.      Right lower leg: No edema.      Left lower leg: No edema.   Lymphadenopathy:      Cervical: No cervical adenopathy.   Skin:     General: Skin is warm and dry.      Coloration: Skin is not pale.      Findings: No erythema or rash.   Neurological:      Mental Status: She is alert and oriented to person, place, and time.      Cranial Nerves: No cranial nerve deficit.      Motor: No abnormal muscle tone.      Coordination: Coordination normal.      Deep Tendon Reflexes: Reflexes are normal and symmetric. Reflexes normal.   Psychiatric:         Behavior: Behavior normal.         Thought Content: Thought content  normal.         Judgment: Judgment normal.         CRANIAL NERVES     CN III, IV, VI   Pupils are equal, round, and reactive to light.  Extraocular motions are normal.      Significant Labs: All pertinent labs within the past 24 hours have been reviewed.  Recent Lab Results  (Last 5 results in the past 24 hours)        10/30/22  0233   10/30/22  0012   10/29/22  2038   10/29/22  1631   10/29/22  1446        POCT Glucose   120     108         Troponin I 0.970  Comment: The reference interval for Troponin I represents the 99th percentile   cutoff   for our facility and is consistent with 3rd generation assay   performance.       1.029  Comment: The reference interval for Troponin I represents the 99th percentile   cutoff   for our facility and is consistent with 3rd generation assay   performance.       1.047  Comment: The reference interval for Troponin I represents the 99th percentile   cutoff   for our facility and is consistent with 3rd generation assay   performance.                                Significant Imaging: I have reviewed all pertinent imaging results/findings within the past 24 hours.  CXR: I have reviewed all pertinent results/findings within the past 24 hours and my personal findings are:  CHF  EKG: I have reviewed all pertinent results/findings within the past 24 hours and my personal findings are: PVC with prolonged Qt. Nonspecific ST changes

## 2022-10-30 NOTE — ASSESSMENT & PLAN NOTE
Due to her dilated cardiomyopathy and persistently low EF. Chronically leaks TPN. It is trending down...   Recent Labs   Lab 10/30/22  0233   TROPONINI 0.970*

## 2022-10-30 NOTE — PLAN OF CARE
Problem: Adult Inpatient Plan of Care  Goal: Plan of Care Review  Outcome: Ongoing, Progressing  Goal: Optimal Comfort and Wellbeing  Outcome: Ongoing, Progressing     Problem: Oral Intake Inadequate (Acute Kidney Injury/Impairment)  Goal: Optimal Nutrition Intake  Outcome: Ongoing, Progressing     Problem: Fall Injury Risk  Goal: Absence of Fall and Fall-Related Injury  Outcome: Ongoing, Progressing

## 2022-10-30 NOTE — ASSESSMENT & PLAN NOTE
Last EF 25% 3/2021. [facility] may have more recent one   She sees Dr. Cortez   toprolol  entresto  Lasix 80 daily at home   Will start with 40mg IV BID here   Continue home meds   Add Bidil 10/29  D/c Bidil 10/30 as she has become mildly hypotensive after diuresis commenced.

## 2022-10-30 NOTE — PROGRESS NOTES
[facility] Medicine  Progress Note    Patient Name: Hafsa Hawley  MRN: [id number]  Patient Class: IP- Inpatient   Admission Date: 10/28/2022  Length of Stay: 1 days  Attending Physician: Colt Evans MD  Primary Care Provider: Cristobal Ann MD        Subjective:     Principal Problem:NICM (nonischemic cardiomyopathy)        HPI:  58 y/o female with h/o combined systolic and diastolic CHF(EF 25% 3/2021), re-presents to the ED 24 hours later with persistent SOB not relieved with IV lasix in ED yesterday and trial of outpt diuresis at home. Her BNP is 2700, 2200 yesterday. Creatinine 1.8--- her baseline. TPN 1.061. 1.061 yesterday. Her EKG has prolonged QT and PVC's. No ST changes consistent with an infarct pattern. She says she has not taken her metolazone in about 2 weeks. She thinks this may be why she tipped over the edge. Admitted for supervised IV diuresis on telemetry.       Overview/Hospital Course:  Put out 2 liters since admit   BP is up this am. 170s/100s   Creatinine 1.9, 1.8 at admit   Reports some mild improvement in her SOB   97% on 3 liters       10/30  Diureses has slowed down a bit. Put out 900ml last 24 hours and is negative 300 for the shift.  BP much better after adding Bidil.  Ranging /54-58  She is 92-94% on 1.5 LPM   BMP pending at time of note.             Past Medical History:   Diagnosis Date    Anemia     Anticoagulant long-term use     Arthritis     Atrial fibrillation     CKD (chronic kidney disease), stage IV 5/8/2018    Congestive heart failure     COPD (chronic obstructive pulmonary disease)     Deep vein thrombosis     elevated bilirubin d/t Gilbert's syndrome     confirmed by [place] genetic testing, evaluated by hepatology    Encounter for blood transfusion     GERD (gastroesophageal reflux disease)     Hypertension     Hypertensive cardiovascular-renal disease, stage 1-4 or unspecified chronic kidney disease, with heart failure  3/4/2022    Pheochromocytoma, malignant     Restrictive lung disease 4/26/2022    Right kidney mass     Sleep apnea     Thalassemia trait, alpha     Thyroid disease     Type 2 diabetes mellitus with hyperglycemia, without long-term current use of insulin 8/13/2020       Past Surgical History:   Procedure Laterality Date    APPENDECTOMY      BONE MARROW BIOPSY      CARDIAC DEFIBRILLATOR PLACEMENT Left 12/2016    CARDIAC ELECTROPHYSIOLOGY MAPPING AND ABLATION      CARDIAC ELECTROPHYSIOLOGY MAPPING AND ABLATION      COLONOSCOPY N/A 5/6/2022    Procedure: COLONOSCOPY;  Surgeon: Arely Betancourt MD;  Location: [facility] ENDO (2ND FLR);  Service: Endoscopy;  Laterality: N/A;  heart transplant candidate/ EF 25% - 2nd floor/ defib - [facility] - [facility]  Eliquis - per Dr. Cortez with [facility], Pt ok to hold Eliquis x 2 days prior-see media tab-outside correspondence dated 12/30/21  - [facility]  verbal instructions/portal instructions/email instructions - s    EYE SURGERY      due to running tears    FRACTURE SURGERY Left     hand 5th digit    HYSTERECTOMY      KNEE SURGERY Left 2016    hematoma    LIVER BIOPSY  10/24/2018    Minimal steatosis, predominantly macrovesicular, 1%, Minimal nonspecific portal inflammation, no fibrosis. No findings on biopsy to explain elevated bilirubin levels. Could be d/t Gilbert's =?- hemolysis    RIGHT HEART CATHETERIZATION Right 12/07/2021    Procedure: INSERTION, CATHETER, RIGHT HEART;  Surgeon: Irving Cardenas MD;  Location: [facility] CATH LAB;  Service: Cardiology;  Laterality: Right;    TRANSJUGULAR BIOPSY OF LIVER N/A 10/24/2018    Procedure: BIOPSY, LIVER, TRANSJUGULAR APPROACH;  Surgeon: Carmen Diagnostic Provider;  [facility];  Service: Radiology;  Laterality: N/A;       Review of patient's allergies indicates:   Allergen Reactions    Penicillins Hives    Iodinated contrast media Nausea And Vomiting    Oxycodone-acetaminophen Other (See Comments)     Nausea,  "Dizziness, Anxiety.  "I don't like how it makes me feel."   Given Hydromorphone 0.5mg IVP  Without problems.  Other reaction(s): Other (See Comments)    Diovan hct [valsartan-hydrochlorothiazide] Other (See Comments)     Causes coughing    Irinotecan      Pt has homozygosity for the TA7 promoter variant that places this individual at significantly increased risk for   severe neutropenia (grade 4) when treated with the standard dose of irinotecan (risk approximately 50%).   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 Metabolism of other drugs not listed here may also be impacted by UGT1A1 enzyme activity.       Tramadol Nausea And Vomiting     Other reaction(s): Other (See Comments)       Current Facility-Administered Medications on File Prior to Encounter   Medication    0.9%  NaCl infusion    vancomycin in dextrose 5 % 1 gram/250 mL IVPB 1,000 mg     Current Outpatient Medications on File Prior to Encounter   Medication Sig    albuterol (ACCUNEB) 0.63 mg/3 mL Nebu Take 3 mLs (0.63 mg total) by nebulization every 6 (six) hours as needed. Rescue    albuterol-ipratropium (DUO-NEB) 2.5 mg-0.5 mg/3 mL nebulizer solution Take 3 mLs by nebulization 2 (two) times a day.    allopurinoL (ZYLOPRIM) 100 MG tablet TAKE 2 TABLETS BY MOUTH ONCE DAILY    allopurinoL (ZYLOPRIM) 100 MG tablet Take 2 tablets (200 mg total) by mouth once daily.    ALPRAZolam (XANAX) 2 MG Tab Take 2 mg by mouth 2 (two) times daily as needed.    apixaban (ELIQUIS) 2.5 mg Tab Take 1 tablet (2.5 mg total) by mouth 2 (two) times a day. Decrease in dose (Patient taking differently: Take 5 mg by mouth 2 (two) times a day.)    atorvastatin (LIPITOR) 40 MG tablet Take 1 tablet (40 mg total) by mouth every evening.    atorvastatin (LIPITOR) 40 MG tablet Take 1 tablet (40 mg total) by mouth every evening.    b complex vitamins " tablet Take 1 tablet by mouth once daily.    blood sugar diagnostic (ACCU-CHEK GUIDE TEST STRIPS) Strp 1 strip by Other route 3 (three) times daily.    blood sugar diagnostic (BLOOD GLUCOSE TEST) Strp 1 strip by Misc.(Non-Drug; Combo Route) route 3 (three) times daily.    blood-glucose meter kit Use as instructed    colchicine (COLCRYS) 0.6 mg tablet TAKE 1 TABLET BY MOUTH TWICE A DAY    diclofenac sodium (VOLTAREN) 1 % Gel APPLY 2 G TOPICALLY 3 (THREE) TIMES DAILY AS NEEDED (PAIN).    diltiazem HCl (DILTIAZEM 2% CREAM) Apply topically 3 (three) times daily. Apply topically to anal area.    ELIQUIS 5 mg Tab TAKE 1 TABLET BY MOUTH TWICE A DAY    empagliflozin (JARDIANCE) 25 mg tablet Jardiance 25 mg tablet    ergocalciferol (ERGOCALCIFEROL) 50,000 unit Cap Take 1 capsule (50,000 Units total) by mouth every 7 days.    ergocalciferol (ERGOCALCIFEROL) 50,000 unit Cap Take 1 capsule (50,000 Units total) by mouth every 7 days.    ferrous sulfate 325 (65 FE) MG EC tablet Take 1 tablet (325 mg total) by mouth once daily.    fluticasone propionate (FLONASE) 50 mcg/actuation nasal spray 2 sprays by Each Nostril route daily as needed for Rhinitis.     furosemide (LASIX) 80 MG tablet Take 1 tablet (80 mg total) by mouth once daily. Use afternoon dose if needed    glimepiride (AMARYL) 2 MG tablet TAKE 1 TABLET BY MOUTH EVERY DAY WITH BREAKFAST    glimepiride (AMARYL) 2 MG tablet Take 1 tablet (2 mg total) by mouth once daily with breakfast    hydrocortisone 2.5 % cream APPLY TOPICALLY 2 (TWO) TIMES DAILY AS NEEDED (HEMORRHOIDS).    lancets (ACCU-CHEK SOFTCLIX LANCETS) Misc 1 Device by Misc.(Non-Drug; Combo Route) route 3 (three) times daily.    LIDOcaine (LIDODERM) 5 % Place 1 patch onto the skin once daily. Remove & Discard patch within 12 hours or as directed by MD    LIDOcaine (LIDODERM) 5 % Place 1 patch onto the skin once daily. Remove & discard patch within 12 hours or as directed by MD    linaCLOtide  (LINZESS) 145 mcg Cap capsule Take 1 capsule (145 mcg total) by mouth before breakfast. Hold if diarrhea    metOLazone (ZAROXOLYN) 5 MG tablet Take 5 mg by mouth.    metoprolol succinate (TOPROL-XL) 100 MG 24 hr tablet Take 100 mg by mouth once daily.    mometasone-formoterol (DULERA) 200-5 mcg/actuation inhaler Inhale 2 puffs into the lungs 2 (two) times daily. Controller    mometasone-formoterol (DULERA) 200-5 mcg/actuation inhaler Inhale 2 puffs into the lungs 2 times daily. controller    montelukast (SINGULAIR) 10 mg tablet Take 1 tablet every day by oral route for 30 days.    nitroGLYCERIN (NITROSTAT) 0.4 MG SL tablet Please see attached for detailed directions    NITROSTAT 0.4 mg SL tablet Take 2.5 tablets (1 mg total) by mouth every 5 (five) minutes as needed for Chest pain. No more than 3 tablets in 15 minutes.    nystatin (MYCOSTATIN) powder Apply topically 2 (two) times daily.    olopatadine (PATADAY) 0.2 % Drop Instill 1 drop into both eyes once daily    ondansetron (ZOFRAN-ODT) 8 MG TbDL Take 8 mg by mouth every 8 (eight) hours as needed.    pantoprazole (PROTONIX) 40 MG tablet TAKE 1 TABLET BY MOUTH DAILY IN THE MORNING    pantoprazole (PROTONIX) 40 MG tablet Take 1 tablet (40 mg total) by mouth every morning.    polyethylene glycol (GLYCOLAX) 17 gram PwPk Take 17 g by mouth 3 (three) times daily as needed (constipation/hard stools).    promethazine-codeine 6.25-10 mg/5 ml (PHENERGAN WITH CODEINE) 6.25-10 mg/5 mL syrup TAKE 5 mLs BY MOUTH EVERY 4 - 6 HOURS AS NEEDED    rOPINIRole (REQUIP) 4 MG tablet Take 1 tablet (4 mg total) by mouth once daily. Pt taking 4mg daily    sacubitriL-valsartan (ENTRESTO)  mg per tablet Take 1 tablet by mouth 2 (two) times daily.    sacubitriL-valsartan (ENTRESTO)  mg per tablet Take 1 tablet by mouth 2 (two) times a day.    scopolamine (TRANSDERM-SCOP) 1.3-1.5 mg (1 mg over 3 days) SMARTSIG:Patch(s) T-DERMAL Every 3 Days    SPIRIVA WITH  HANDIHALER 18 mcg inhalation capsule INHALE 1 CAPSULE INTO THE LUNGS ONCE DAILY.    VENTOLIN HFA 90 mcg/actuation inhaler Inhale 2 puffs into the lungs 2 (two) times daily as needed.     walker Misc 1 Device by Misc.(Non-Drug; Combo Route) route as needed.     Family History       Problem Relation (Age of Onset)    Cancer Mother    Cirrhosis Brother    Diabetes Brother    Heart attack Father    Heart disease Father, Sister, Brother, Sister, Brother    Hypertension Father, Brother          Tobacco Use    Smoking status: Never    Smokeless tobacco: Never   Substance and Sexual Activity    Alcohol use: Yes     Comment: up to 1 yr ago drank 2-3 drinks on occasion but sporadic    Drug use: No    Sexual activity: Yes     Partners: Male     Review of Systems   Constitutional:  Negative for chills and fever.   Respiratory:  Positive for shortness of breath and wheezing.    Cardiovascular:  Negative for chest pain and palpitations.   Gastrointestinal:  Negative for abdominal pain, blood in stool, constipation and nausea.   Genitourinary:  Negative for difficulty urinating.   Psychiatric/Behavioral:  Negative for dysphoric mood, sleep disturbance and suicidal ideas. The patient is not nervous/anxious.    Objective:     Vital Signs (Most Recent):  Temp: 97.7 °F (36.5 °C) (10/30/22 0721)  Pulse: 75 (10/30/22 0721)  Resp: 14 (10/30/22 0721)  BP: (!) 108/58 (10/30/22 0400)  SpO2: (!) 92 % (10/30/22 0721)   Vital Signs (24h Range):  Temp:  [96.6 °F (35.9 °C)-98.7 °F (37.1 °C)] 97.7 °F (36.5 °C)  Pulse:  [69-93] 75  Resp:  [14-24] 14  SpO2:  [92 %-97 %] 92 %  BP: (104-174)/() 108/58     Weight: 97.5 kg (215 lb)  Body mass index is 34.7 kg/m².    Physical Exam  Constitutional:       Appearance: She is well-developed.   HENT:      Head: Normocephalic and atraumatic.      Right Ear: External ear normal.      Left Ear: External ear normal.      Nose: Nose normal.   Eyes:      Extraocular Movements: EOM normal.      Pupils:  Pupils are equal, round, and reactive to light.   Neck:      Thyroid: No thyromegaly.      Vascular: No JVD.      Trachea: No tracheal deviation.   Cardiovascular:      Rate and Rhythm: Normal rate.      Heart sounds: Normal heart sounds. No murmur heard.  Pulmonary:      Effort: Pulmonary effort is normal. No respiratory distress.      Breath sounds: Rales present. No wheezing.      Comments: Very fine crackles right base   Chest:      Chest wall: No tenderness.   Abdominal:      General: Bowel sounds are normal. There is no distension.      Palpations: Abdomen is soft. There is no mass.      Tenderness: There is no abdominal tenderness. There is no guarding or rebound.   Musculoskeletal:         General: No tenderness. Normal range of motion.      Cervical back: Normal range of motion and neck supple.      Right lower leg: No edema.      Left lower leg: No edema.   Lymphadenopathy:      Cervical: No cervical adenopathy.   Skin:     General: Skin is warm and dry.      Coloration: Skin is not pale.      Findings: No erythema or rash.   Neurological:      Mental Status: She is alert and oriented to person, place, and time.      Cranial Nerves: No cranial nerve deficit.      Motor: No abnormal muscle tone.      Coordination: Coordination normal.      Deep Tendon Reflexes: Reflexes are normal and symmetric. Reflexes normal.   Psychiatric:         Behavior: Behavior normal.         Thought Content: Thought content normal.         Judgment: Judgment normal.         CRANIAL NERVES     CN III, IV, VI   Pupils are equal, round, and reactive to light.  Extraocular motions are normal.      Significant Labs: All pertinent labs within the past 24 hours have been reviewed.  Recent Lab Results  (Last 5 results in the past 24 hours)        10/30/22  0233   10/30/22  0012   10/29/22  2038   10/29/22  1631   10/29/22  1446        POCT Glucose   120     108         Troponin I 0.970  Comment: The reference interval for Troponin I  represents the 99th percentile   cutoff   for our facility and is consistent with 3rd generation assay   performance.       1.029  Comment: The reference interval for Troponin I represents the 99th percentile   cutoff   for our facility and is consistent with 3rd generation assay   performance.       1.047  Comment: The reference interval for Troponin I represents the 99th percentile   cutoff   for our facility and is consistent with 3rd generation assay   performance.                                Significant Imaging: I have reviewed all pertinent imaging results/findings within the past 24 hours.  CXR: I have reviewed all pertinent results/findings within the past 24 hours and my personal findings are:  CHF  EKG: I have reviewed all pertinent results/findings within the past 24 hours and my personal findings are: PVC with prolonged Qt. Nonspecific ST changes       Assessment/Plan:      * NICM (nonischemic cardiomyopathy)  Last EF 25% 3/2021. [facility] may have more recent one   She sees Dr. Cortez   toprolol  entresto  Lasix 80 daily at home   Will start with 40mg IV BID here   Continue home meds   Dobutamine? [facility] to see Monday       Hypertensive cardiovascular-renal disease, stage 1-4 or unspecified chronic kidney disease, with heart failure  Currently at her baseline creatinine, 1.8  Will monitor closely while diuresing   BMP  Lab Results   Component Value Date     10/29/2022    K 3.8 10/29/2022     10/29/2022    CO2 23 10/29/2022    BUN 36 (H) 10/29/2022    CREATININE 1.9 (H) 10/29/2022    CALCIUM 9.1 10/29/2022    ANIONGAP 14 10/29/2022    ESTGFRAFRICA 29.7 (A) 04/26/2022    EGFRNONAA 25.7 (A) 04/26/2022           Type 2 diabetes mellitus without complication, without long-term current use of insulin  Patient's FSGs are controlled on current medication regimen.  Last A1c reviewed-   Lab Results   Component Value Date    HGBA1C 6.5 (H) 08/23/2022     Most recent fingerstick glucose reviewed-   Recent Labs    Lab 10/29/22  1130 10/29/22  1631 10/30/22  0012   POCTGLUCOSE 150* 108 120*     Current correctional scale  Medium  Increase anti-hyperglycemic dose as follows-   Antihyperglycemics (From admission, onward)    Start     Stop Route Frequency Ordered    10/29/22 0715  insulin aspart U-100 pen 7 Units         -- SubQ 3 times daily with meals 10/28/22 1915    10/28/22 1914  insulin aspart U-100 pen 0-5 Units         -- SubQ Before meals & nightly PRN 10/28/22 1915        Hold Oral hypoglycemics while patient is in the hospital.    Chronic combined systolic and diastolic heart failure  Last EF 25% 3/2021. [facility] may have more recent one   She sees Dr. Dana matamorosol  entresto  Lasix 80 daily at home   Will start with 40mg IV BID here   Continue home meds   Add Bidil 10/29  D/c Bidil 10/30 as she has become mildly hypotensive after diuresis commenced.        Paroxysmal atrial fibrillation  Patient with Permanent atrial fibrillation which is controlled currently with Beta Blocker. Patient is currently in sinus rhythm.[id number] Score: 3. HASBLED Score: . Anticoagulation indicated. Anticoagulation done with eliquis.        MELISSA (obstructive sleep apnea)  She will call  to bring in her home cpap. Sounds like she has an auto cpap unit. Has not set it up at home yet. We can have respiratory set up here.         Essential hypertension  entresto  Metoprolol  Add bidil 10/29  D/C bidil 10/30 as her BP this morning is 88/50  Hold BB, entresto for systolic less than 90       Microcytic anemia  Lab Results   Component Value Date    IRON 85 02/15/2022    TRANSFERRIN 209 02/15/2022    TIBC 309 02/15/2022    FESATURATED 28 02/15/2022      Continue home iron      Elevated troponin  Due to her dilated cardiomyopathy and persistently low EF. Chronically leaks TPN. It is trending down...   Recent Labs   Lab 10/30/22  0233   TROPONINI 0.970*             VTE Risk Mitigation (From admission, onward)         Ordered     apixaban tablet  2.5 mg  2 times daily         10/28/22 1943     IP VTE HIGH RISK PATIENT  Once         10/28/22 1915     Place sequential compression device  Until discontinued         10/28/22 1915                Discharge Planning   CHIQUI:      Code Status: Full Code   Is the patient medically ready for discharge?:     Reason for patient still in hospital (select all that apply): Treatment               Critical care time spent on the evaluation and treatment of severe organ dysfunction, review of pertinent labs and imaging studies, discussions with consulting providers and discussions with patient/family: 45 minutes.      Colt Evans MD  Department of Hospital Medicine   [place] - Intensive Care

## 2022-10-30 NOTE — ASSESSMENT & PLAN NOTE
Patient with Permanent atrial fibrillation which is controlled currently with Beta Blocker. Patient is currently in sinus rhythm.[id number] Score: 3. HASBLED Score: . Anticoagulation indicated. Anticoagulation done with eliquis.

## 2022-10-30 NOTE — ASSESSMENT & PLAN NOTE
Last EF 25% 3/2021. [facility] may have more recent one   She sees Dr. Cortez   topindigo  entresto  Lasix 80 daily at home   Will start with 40mg IV BID here   Continue home meds   Dobutamine? [facility] to see Monday

## 2022-10-30 NOTE — NURSING
Pt transferred in wheelchair to med surg room 4 with o2 in place. Transported by CNA. Pt tolerated well

## 2022-10-30 NOTE — ASSESSMENT & PLAN NOTE
entresto  Metoprolol  Add bidil 10/29  D/C bidil 10/30 as her BP this morning is 88/50  Hold BB, entresto for systolic less than 90

## 2022-10-30 NOTE — ASSESSMENT & PLAN NOTE
Patient's FSGs are controlled on current medication regimen.  Last A1c reviewed-   Lab Results   Component Value Date    HGBA1C 6.5 (H) 08/23/2022     Most recent fingerstick glucose reviewed-   Recent Labs   Lab 10/29/22  1130 10/29/22  1631 10/30/22  0012   POCTGLUCOSE 150* 108 120*     Current correctional scale  Medium  Increase anti-hyperglycemic dose as follows-   Antihyperglycemics (From admission, onward)    Start     Stop Route Frequency Ordered    10/29/22 0715  insulin aspart U-100 pen 7 Units         -- SubQ 3 times daily with meals 10/28/22 1915    10/28/22 1914  insulin aspart U-100 pen 0-5 Units         -- SubQ Before meals & nightly PRN 10/28/22 1915        Hold Oral hypoglycemics while patient is in the hospital.

## 2022-10-31 LAB
ANION GAP SERPL CALC-SCNC: 14 MMOL/L (ref 8–16)
ANISOCYTOSIS BLD QL SMEAR: ABNORMAL
BASOPHILS # BLD AUTO: 0.02 K/UL (ref 0–0.2)
BASOPHILS NFR BLD: 0.4 % (ref 0–1.9)
BUN SERPL-MCNC: 62 MG/DL (ref 6–20)
CALCIUM SERPL-MCNC: 8.7 MG/DL (ref 8.7–10.5)
CHLORIDE SERPL-SCNC: 101 MMOL/L (ref 95–110)
CO2 SERPL-SCNC: 24 MMOL/L (ref 23–29)
CREAT SERPL-MCNC: 2.3 MG/DL (ref 0.5–1.4)
DACRYOCYTES BLD QL SMEAR: ABNORMAL
DIFFERENTIAL METHOD: ABNORMAL
EOSINOPHIL # BLD AUTO: 0.2 K/UL (ref 0–0.5)
EOSINOPHIL NFR BLD: 4.4 % (ref 0–8)
ERYTHROCYTE [DISTWIDTH] IN BLOOD BY AUTOMATED COUNT: 27.1 % (ref 11.5–14.5)
EST. GFR  (NO RACE VARIABLE): 24 ML/MIN/1.73 M^2
GLUCOSE SERPL-MCNC: 123 MG/DL (ref 70–110)
HCT VFR BLD AUTO: 32.8 % (ref 37–48.5)
HGB BLD-MCNC: 9.5 G/DL (ref 12–16)
HYPOCHROMIA BLD QL SMEAR: ABNORMAL
IMM GRANULOCYTES # BLD AUTO: 0.02 K/UL (ref 0–0.04)
IMM GRANULOCYTES NFR BLD AUTO: 0.4 % (ref 0–0.5)
LYMPHOCYTES # BLD AUTO: 1.2 K/UL (ref 1–4.8)
LYMPHOCYTES NFR BLD: 23.3 % (ref 18–48)
MCH RBC QN AUTO: 17.3 PG (ref 27–31)
MCHC RBC AUTO-ENTMCNC: 29 G/DL (ref 32–36)
MCV RBC AUTO: 60 FL (ref 82–98)
MONOCYTES # BLD AUTO: 0.6 K/UL (ref 0.3–1)
MONOCYTES NFR BLD: 11.6 % (ref 4–15)
NEUTROPHILS # BLD AUTO: 3 K/UL (ref 1.8–7.7)
NEUTROPHILS NFR BLD: 59.9 % (ref 38–73)
NRBC BLD-RTO: 1 /100 WBC
OVALOCYTES BLD QL SMEAR: ABNORMAL
PLATELET # BLD AUTO: 186 K/UL (ref 150–450)
PLATELET BLD QL SMEAR: ABNORMAL
PMV BLD AUTO: ABNORMAL FL (ref 9.2–12.9)
POCT GLUCOSE: 147 MG/DL (ref 70–110)
POCT GLUCOSE: 153 MG/DL (ref 70–110)
POCT GLUCOSE: 181 MG/DL (ref 70–110)
POCT GLUCOSE: 225 MG/DL (ref 70–110)
POIKILOCYTOSIS BLD QL SMEAR: ABNORMAL
POLYCHROMASIA BLD QL SMEAR: ABNORMAL
POTASSIUM SERPL-SCNC: 3.5 MMOL/L (ref 3.5–5.1)
RBC # BLD AUTO: 5.5 M/UL (ref 4–5.4)
SCHISTOCYTES BLD QL SMEAR: PRESENT
SODIUM SERPL-SCNC: 139 MMOL/L (ref 136–145)
SPHEROCYTES BLD QL SMEAR: ABNORMAL
TARGETS BLD QL SMEAR: ABNORMAL
TROPONIN I SERPL DL<=0.01 NG/ML-MCNC: 0.85 NG/ML (ref 0–0.03)
WBC # BLD AUTO: 4.98 K/UL (ref 3.9–12.7)

## 2022-10-31 PROCEDURE — 94761 N-INVAS EAR/PLS OXIMETRY MLT: CPT

## 2022-10-31 PROCEDURE — 11000001 HC ACUTE MED/SURG PRIVATE ROOM

## 2022-10-31 PROCEDURE — 36415 COLL VENOUS BLD VENIPUNCTURE: CPT | Performed by: FAMILY MEDICINE

## 2022-10-31 PROCEDURE — 80048 BASIC METABOLIC PNL TOTAL CA: CPT | Performed by: FAMILY MEDICINE

## 2022-10-31 PROCEDURE — 99232 PR SUBSEQUENT HOSPITAL CARE,LEVL II: ICD-10-PCS | Mod: ,,, | Performed by: INTERNAL MEDICINE

## 2022-10-31 PROCEDURE — 25000003 PHARM REV CODE 250: Performed by: FAMILY MEDICINE

## 2022-10-31 PROCEDURE — 99232 SBSQ HOSP IP/OBS MODERATE 35: CPT | Mod: ,,, | Performed by: INTERNAL MEDICINE

## 2022-10-31 PROCEDURE — 27000221 HC OXYGEN, UP TO 24 HOURS

## 2022-10-31 PROCEDURE — 99900035 HC TECH TIME PER 15 MIN (STAT)

## 2022-10-31 PROCEDURE — 85025 COMPLETE CBC W/AUTO DIFF WBC: CPT | Performed by: NURSE PRACTITIONER

## 2022-10-31 PROCEDURE — 36415 COLL VENOUS BLD VENIPUNCTURE: CPT | Performed by: NURSE PRACTITIONER

## 2022-10-31 PROCEDURE — 84484 ASSAY OF TROPONIN QUANT: CPT | Performed by: NURSE PRACTITIONER

## 2022-10-31 PROCEDURE — 63600175 PHARM REV CODE 636 W HCPCS: Performed by: FAMILY MEDICINE

## 2022-10-31 PROCEDURE — 63600175 PHARM REV CODE 636 W HCPCS: Performed by: SURGERY

## 2022-10-31 PROCEDURE — 94640 AIRWAY INHALATION TREATMENT: CPT

## 2022-10-31 RX ORDER — DULOXETIN HYDROCHLORIDE 30 MG/1
60 CAPSULE, DELAYED RELEASE ORAL DAILY
Status: DISCONTINUED | OUTPATIENT
Start: 2022-10-31 | End: 2022-10-31

## 2022-10-31 RX ADMIN — INSULIN ASPART 7 UNITS: 100 INJECTION, SOLUTION INTRAVENOUS; SUBCUTANEOUS at 08:10

## 2022-10-31 RX ADMIN — APIXABAN 2.5 MG: 2.5 TABLET, FILM COATED ORAL at 09:10

## 2022-10-31 RX ADMIN — ONDANSETRON 8 MG: 2 INJECTION INTRAMUSCULAR; INTRAVENOUS at 10:10

## 2022-10-31 RX ADMIN — PANTOPRAZOLE SODIUM 40 MG: 40 TABLET, DELAYED RELEASE ORAL at 09:10

## 2022-10-31 RX ADMIN — TIOTROPIUM BROMIDE INHALATION SPRAY 2 PUFF: 3.12 SPRAY, METERED RESPIRATORY (INHALATION) at 08:10

## 2022-10-31 RX ADMIN — FERROUS SULFATE TAB 325 MG (65 MG ELEMENTAL FE) 1 EACH: 325 (65 FE) TAB at 09:10

## 2022-10-31 RX ADMIN — ACETAMINOPHEN 650 MG: 325 TABLET ORAL at 09:10

## 2022-10-31 RX ADMIN — INSULIN ASPART 1 UNITS: 100 INJECTION, SOLUTION INTRAVENOUS; SUBCUTANEOUS at 08:10

## 2022-10-31 RX ADMIN — ROPINIROLE HYDROCHLORIDE 4 MG: 1 TABLET, FILM COATED ORAL at 08:10

## 2022-10-31 RX ADMIN — APIXABAN 2.5 MG: 2.5 TABLET, FILM COATED ORAL at 08:10

## 2022-10-31 RX ADMIN — ATORVASTATIN CALCIUM 40 MG: 20 TABLET, FILM COATED ORAL at 08:10

## 2022-10-31 RX ADMIN — SACUBITRIL AND VALSARTAN 1 TABLET: 97; 103 TABLET, FILM COATED ORAL at 09:10

## 2022-10-31 RX ADMIN — INSULIN ASPART 7 UNITS: 100 INJECTION, SOLUTION INTRAVENOUS; SUBCUTANEOUS at 04:10

## 2022-10-31 RX ADMIN — METOPROLOL SUCCINATE 100 MG: 50 TABLET, EXTENDED RELEASE ORAL at 09:10

## 2022-10-31 RX ADMIN — MUPIROCIN: 20 OINTMENT TOPICAL at 09:10

## 2022-10-31 RX ADMIN — INSULIN ASPART 7 UNITS: 100 INJECTION, SOLUTION INTRAVENOUS; SUBCUTANEOUS at 12:10

## 2022-10-31 NOTE — ASSESSMENT & PLAN NOTE
entresto  Metoprolol  Add bidil 10/29  D/C bidil 10/30 as her BP this morning is 88/50  Hold BB, rafaelsto for systolic less than 90   10/31 bp 110/72

## 2022-10-31 NOTE — PLAN OF CARE
Telemetry monitor showing PVCs, couplets, bigeminy, 5 beat run of V tach, 2x 4 beat run of V tach . Dr. Evans notified. Magnesium and BMP ordered.   Pt utilized CPAP, but removed it and placed nasal canula back on.       Problem: Adult Inpatient Plan of Care  Goal: Plan of Care Review  Outcome: Ongoing, Progressing  Goal: Patient-Specific Goal (Individualized)  Outcome: Ongoing, Progressing  Goal: Absence of Hospital-Acquired Illness or Injury  Outcome: Ongoing, Progressing  Goal: Optimal Comfort and Wellbeing  Outcome: Ongoing, Progressing  Goal: Readiness for Transition of Care  Outcome: Ongoing, Progressing     Problem: Diabetes Comorbidity  Goal: Blood Glucose Level Within Targeted Range  Outcome: Ongoing, Progressing     Problem: Fluid and Electrolyte Imbalance (Acute Kidney Injury/Impairment)  Goal: Fluid and Electrolyte Balance  Outcome: Ongoing, Progressing     Problem: Oral Intake Inadequate (Acute Kidney Injury/Impairment)  Goal: Optimal Nutrition Intake  Outcome: Ongoing, Progressing     Problem: Renal Function Impairment (Acute Kidney Injury/Impairment)  Goal: Effective Renal Function  Outcome: Ongoing, Progressing     Problem: Fall Injury Risk  Goal: Absence of Fall and Fall-Related Injury  Outcome: Ongoing, Progressing     Problem: Gas Exchange Impaired  Goal: Optimal Gas Exchange  Outcome: Ongoing, Progressing

## 2022-10-31 NOTE — ASSESSMENT & PLAN NOTE
She will call  to bring in her home cpap. Sounds like she has an auto cpap unit. Has not set it up at home yet. We can have respiratory set up here. Used it last night- uncomfortable but will cont to try

## 2022-10-31 NOTE — ASSESSMENT & PLAN NOTE
Patient with Permanent atrial fibrillation which is controlled currently with Beta Blocker. Patient is currently in sinus rhythm.[id number] Score: 3. HASBLED Score: . Anticoagulation indicated. Anticoagulation done with eliquis.

## 2022-10-31 NOTE — PLAN OF CARE
Pt alert and oriented X4. Pt independent to bathroom.Pt remains on 2L nasal canula. Pt up to chair throughout day. Accu checks ac and hs. [facility] on case. Pt with no complaints of pain/discomfort throughout shift.       Problem: Adult Inpatient Plan of Care  Goal: Plan of Care Review  Outcome: Ongoing, Progressing  Goal: Patient-Specific Goal (Individualized)  Outcome: Ongoing, Progressing  Goal: Absence of Hospital-Acquired Illness or Injury  Outcome: Ongoing, Progressing  Goal: Optimal Comfort and Wellbeing  Outcome: Ongoing, Progressing  Goal: Readiness for Transition of Care  Outcome: Ongoing, Progressing     Problem: Diabetes Comorbidity  Goal: Blood Glucose Level Within Targeted Range  Outcome: Ongoing, Progressing     Problem: Fluid and Electrolyte Imbalance (Acute Kidney Injury/Impairment)  Goal: Fluid and Electrolyte Balance  Outcome: Ongoing, Progressing     Problem: Oral Intake Inadequate (Acute Kidney Injury/Impairment)  Goal: Optimal Nutrition Intake  Outcome: Ongoing, Progressing     Problem: Renal Function Impairment (Acute Kidney Injury/Impairment)  Goal: Effective Renal Function  Outcome: Ongoing, Progressing     Problem: Fall Injury Risk  Goal: Absence of Fall and Fall-Related Injury  Outcome: Ongoing, Progressing     Problem: Gas Exchange Impaired  Goal: Optimal Gas Exchange  Outcome: Ongoing, Progressing

## 2022-10-31 NOTE — ASSESSMENT & PLAN NOTE
Last EF 25% 3/2021. [facility] may have more recent one   She sees Dr. Cortez   toprolol  entresto  Lasix 80 daily at home   Will start with 40mg IV BID here   Continue home meds   Add Bidil 10/29  D/c Bidil 10/30 as she has become mildly hypotensive after diuresis commenced.      10/31 Lasix held this am as her creat has bunmped. She ahs diuresed 2775 since admission. Bp this am 110/72, cont entresto and BB  Will not give back IVF just hold diuretic and let her naturally improve on her own. OK for PO fluids  Has home oxygen but was not using constantly, wean today as toelrated

## 2022-10-31 NOTE — ASSESSMENT & PLAN NOTE
Last EF 30% per [facility] records  She sees Dr. Cortez   topindigo  entresto  Lasix 80 daily at home   Will start with 40mg IV BID here   Continue home meds   Dobutamine? [facility] to see Monday     10/31: [facility] following. Holding lasix. Monitor renal fxn. Cont other meds same dose for now per [facility] recommendations

## 2022-10-31 NOTE — ASSESSMENT & PLAN NOTE
Due to her dilated cardiomyopathy and persistently low EF. Chronically leaks TPN. It is trending down...   Recent Labs   Lab 10/31/22  0544   TROPONINI 0.846*     Repeat today still trending down

## 2022-10-31 NOTE — PLAN OF CARE
[place] - Med Surg (3rd Fl)  Initial Discharge Assessment       Primary Care Provider: Cristobal Ann MD    Admission Diagnosis: Shortness of breath [R06.02]  Cardiovascular disease [I25.10]  Congestive heart failure, unspecified HF chronicity, unspecified heart failure type [I50.9]    Admission Date: 10/28/2022  Expected Discharge Date:     Discharge Barriers Identified: None    Payor: MEDICAID / Plan: [employer] [facility] ([facility]) / Product Type: Managed Medicaid /     Extended Emergency Contact Information  Primary Emergency Contact: Jaye Baum  Address: [address] [place] [address] [facility]  Home Phone: [phone]  Relation: Sister  Secondary Emergency Contact: Jeanie Jaimes  Mobile Phone: [phone]  Relation: Sister    Discharge Plan A: Home  Discharge Plan B: Home with family      [id number] [place] - [address]  [address]  [place] [address]  Phone: [phone] Fax: [phone]    [facility] [address] [place] [address]  Phone: [phone] Fax: [phone]      Initial Assessment (most recent)       Adult Discharge Assessment - 10/31/22 1159          Discharge Assessment    Assessment Type Discharge Planning Assessment     Confirmed/corrected address, phone number and insurance Yes     Confirmed Demographics Correct on Facesheet     Source of Information patient     Communicated CHIQUI with patient/caregiver Date not available/Unable to determine     Reason For Admission NICM     Lives With spouse;grandchild(ricky)     Facility Arrived From: Home     Do you expect to return to your current living situation? Yes     Do you have help at home or someone to help you manage your care at home? Yes     Prior to hospitilization cognitive status: Alert/Oriented     Current cognitive status: Alert/Oriented     Walking or Climbing Stairs Difficulty ambulation difficulty, requires equipment     Dressing/Bathing Difficulty bathing difficulty,  requires equipment     Equipment Currently Used at Home walker, rolling;shower chair;CPAP;oxygen     Readmission within 30 days? No     Patient currently being followed by outpatient case management? No     Do you currently have service(s) that help you manage your care at home? No     Do you take prescription medications? Yes     Do you have prescription coverage? Yes     Coverage [facility] Medicaid     Do you have any problems affording any of your prescribed medications? No     How do you get to doctors appointments? car, drives self;family or friend will provide     Are you on dialysis? No     Do you take coumadin? No     Discharge Plan A Home     Discharge Plan B Home with family     DME Needed Upon Discharge  none     Discharge Plan discussed with: Patient     Discharge Barriers Identified None                        Discharge assessment completed with patient. Denies any discharge needs at this time. SW to remain available.

## 2022-10-31 NOTE — ASSESSMENT & PLAN NOTE
Currently at her baseline creatinine, 1.8  Will monitor closely while diuresing   BMP  Lab Results   Component Value Date     10/31/2022    K 3.5 10/31/2022     10/31/2022    CO2 24 10/31/2022    BUN 62 (H) 10/31/2022    CREATININE 2.3 (H) 10/31/2022    CALCIUM 8.7 10/31/2022    ANIONGAP 14 10/31/2022    ESTGFRAFRICA 29.7 (A) 04/26/2022    EGFRNONAA 25.7 (A) 04/26/2022       10/31:  She is a little dry lasix held today and is better creat 2.5>2.3    Lasix held this am as her creat has bunmped. She ahs diuresed 2775 since admission. Bp this am 110/72, cont entresto and BB  Will not give back IVF just hold diuretic and let her naturally improve on her own. OK for PO fluids

## 2022-10-31 NOTE — ASSESSMENT & PLAN NOTE
Patient's FSGs are controlled on current medication regimen.  Last A1c reviewed-   Lab Results   Component Value Date    HGBA1C 6.5 (H) 08/23/2022     Most recent fingerstick glucose reviewed-   Recent Labs   Lab 10/30/22  1148 10/30/22  1615 10/30/22  1947 10/31/22  0722   POCTGLUCOSE 164* 177* 122* 153*     Current correctional scale  Medium  Increase anti-hyperglycemic dose as follows-   Antihyperglycemics (From admission, onward)    Start     Stop Route Frequency Ordered    10/29/22 0715  insulin aspart U-100 pen 7 Units         -- SubQ 3 times daily with meals 10/28/22 1915    10/28/22 1914  insulin aspart U-100 pen 0-5 Units         -- SubQ Before meals & nightly PRN 10/28/22 1915        Hold Oral hypoglycemics while patient is in the hospital. this am

## 2022-10-31 NOTE — SUBJECTIVE & OBJECTIVE
Past Medical History:   Diagnosis Date    Anemia     Anticoagulant long-term use     Arthritis     Atrial fibrillation     CKD (chronic kidney disease), stage IV 5/8/2018    Congestive heart failure     COPD (chronic obstructive pulmonary disease)     Deep vein thrombosis     elevated bilirubin d/t Gilbert's syndrome     confirmed by [place] genetic testing, evaluated by hepatology    Encounter for blood transfusion     GERD (gastroesophageal reflux disease)     Hypertension     Hypertensive cardiovascular-renal disease, stage 1-4 or unspecified chronic kidney disease, with heart failure 3/4/2022    Pheochromocytoma, malignant     Restrictive lung disease 4/26/2022    Right kidney mass     Sleep apnea     Thalassemia trait, alpha     Thyroid disease     Type 2 diabetes mellitus with hyperglycemia, without long-term current use of insulin 8/13/2020       Past Surgical History:   Procedure Laterality Date    APPENDECTOMY      BONE MARROW BIOPSY      CARDIAC DEFIBRILLATOR PLACEMENT Left 12/2016    CARDIAC ELECTROPHYSIOLOGY MAPPING AND ABLATION      CARDIAC ELECTROPHYSIOLOGY MAPPING AND ABLATION      COLONOSCOPY N/A 5/6/2022    Procedure: COLONOSCOPY;  Surgeon: Arely Betancourt MD;  Location: [facility] ([address] [place] [address]);  Service: Endoscopy;  Laterality: N/A;  heart transplant candidate/ EF 25% - 2nd floor/ defib - Biotronik - [facility]  Eliquis - per Dr. Cortez with [facility], Pt ok to hold Eliquis x 2 days prior-see media tab-outside correspondence dated 12/30/21  - [facility]  verbal instructions/portal instructions/email instructions - s    EYE SURGERY      due to running tears    FRACTURE SURGERY Left     hand 5th digit    HYSTERECTOMY      KNEE SURGERY Left 2016    hematoma    LIVER BIOPSY  10/24/2018    Minimal steatosis, predominantly macrovesicular, 1%, Minimal nonspecific portal inflammation, no fibrosis. No findings on biopsy to explain elevated bilirubin levels. Could be d/t Gilbert's =?- hemolysis    RIGHT HEART CATHETERIZATION  "Right 12/07/2021    Procedure: INSERTION, CATHETER, RIGHT HEART;  Surgeon: Irving Cardenas MD;  Location: [facility] CATH LAB;  Service: Cardiology;  Laterality: Right;    TRANSJUGULAR BIOPSY OF LIVER N/A 10/24/2018    Procedure: BIOPSY, LIVER, TRANSJUGULAR APPROACH;  Surgeon: Carmen Diagnostic Provider;  Location: [facility] OR [address] [place] [address];  Service: Radiology;  Laterality: N/A;       Review of patient's allergies indicates:   Allergen Reactions    Penicillins Hives    Iodinated contrast media Nausea And Vomiting    Oxycodone-acetaminophen Other (See Comments)     Nausea, Dizziness, Anxiety.  "I don't like how it makes me feel."   Given Hydromorphone 0.5mg IVP  Without problems.  Other reaction(s): Other (See Comments)    Diovan hct [valsartan-hydrochlorothiazide] Other (See Comments)     Causes coughing    Irinotecan      Pt has homozygosity for the TA7 promoter variant that places this individual at significantly increased risk for   severe neutropenia (grade 4) when treated with the standard dose of irinotecan (risk approximately 50%).   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 Metabolism of other drugs not listed here may also be impacted by UGT1A1 enzyme activity.       Tramadol Nausea And Vomiting     Other reaction(s): Other (See Comments)       Current Facility-Administered Medications on File Prior to Encounter   Medication    0.9%  NaCl infusion    vancomycin in dextrose 5 % 1 gram/250 mL IVPB 1,000 mg     Current Outpatient Medications on File Prior to Encounter   Medication Sig    albuterol (ACCUNEB) 0.63 mg/3 mL Nebu Take 3 mLs (0.63 mg total) by nebulization every 6 (six) hours as needed. Rescue    albuterol-ipratropium (DUO-NEB) 2.5 mg-0.5 mg/3 mL nebulizer solution Take 3 mLs by nebulization 2 (two) times a day.    allopurinoL (ZYLOPRIM) 100 MG tablet TAKE 2 TABLETS BY MOUTH ONCE DAILY    " allopurinoL (ZYLOPRIM) 100 MG tablet Take 2 tablets (200 mg total) by mouth once daily.    ALPRAZolam (XANAX) 2 MG Tab Take 2 mg by mouth 2 (two) times daily as needed.    apixaban (ELIQUIS) 2.5 mg Tab Take 1 tablet (2.5 mg total) by mouth 2 (two) times a day. Decrease in dose (Patient taking differently: Take 5 mg by mouth 2 (two) times a day.)    atorvastatin (LIPITOR) 40 MG tablet Take 1 tablet (40 mg total) by mouth every evening.    atorvastatin (LIPITOR) 40 MG tablet Take 1 tablet (40 mg total) by mouth every evening.    b complex vitamins tablet Take 1 tablet by mouth once daily.    blood sugar diagnostic (ACCU-CHEK GUIDE TEST STRIPS) Strp 1 strip by Other route 3 (three) times daily.    blood sugar diagnostic (BLOOD GLUCOSE TEST) Strp 1 strip by Misc.(Non-Drug; Combo Route) route 3 (three) times daily.    blood-glucose meter kit Use as instructed    colchicine (COLCRYS) 0.6 mg tablet TAKE 1 TABLET BY MOUTH TWICE A DAY    diclofenac sodium (VOLTAREN) 1 % Gel APPLY 2 G TOPICALLY 3 (THREE) TIMES DAILY AS NEEDED (PAIN).    diltiazem HCl (DILTIAZEM 2% CREAM) Apply topically 3 (three) times daily. Apply topically to anal area.    ELIQUIS 5 mg Tab TAKE 1 TABLET BY MOUTH TWICE A DAY    empagliflozin (JARDIANCE) 25 mg tablet Jardiance 25 mg tablet    ergocalciferol (ERGOCALCIFEROL) 50,000 unit Cap Take 1 capsule (50,000 Units total) by mouth every 7 days.    ergocalciferol (ERGOCALCIFEROL) 50,000 unit Cap Take 1 capsule (50,000 Units total) by mouth every 7 days.    ferrous sulfate 325 (65 FE) MG EC tablet Take 1 tablet (325 mg total) by mouth once daily.    fluticasone propionate (FLONASE) 50 mcg/actuation nasal spray 2 sprays by Each Nostril route daily as needed for Rhinitis.     furosemide (LASIX) 80 MG tablet Take 1 tablet (80 mg total) by mouth once daily. Use afternoon dose if needed    glimepiride (AMARYL) 2 MG tablet TAKE 1 TABLET BY MOUTH EVERY DAY WITH BREAKFAST    glimepiride (AMARYL) 2 MG tablet Take 1  tablet (2 mg total) by mouth once daily with breakfast    hydrocortisone 2.5 % cream APPLY TOPICALLY 2 (TWO) TIMES DAILY AS NEEDED (HEMORRHOIDS).    lancets (ACCU-CHEK SOFTCLIX LANCETS) Misc 1 Device by Misc.(Non-Drug; Combo Route) route 3 (three) times daily.    LIDOcaine (LIDODERM) 5 % Place 1 patch onto the skin once daily. Remove & Discard patch within 12 hours or as directed by MD    LIDOcaine (LIDODERM) 5 % Place 1 patch onto the skin once daily. Remove & discard patch within 12 hours or as directed by MD    linaCLOtide (LINZESS) 145 mcg Cap capsule Take 1 capsule (145 mcg total) by mouth before breakfast. Hold if diarrhea    metOLazone (ZAROXOLYN) 5 MG tablet Take 5 mg by mouth.    metoprolol succinate (TOPROL-XL) 100 MG 24 hr tablet Take 100 mg by mouth once daily.    mometasone-formoterol (DULERA) 200-5 mcg/actuation inhaler Inhale 2 puffs into the lungs 2 (two) times daily. Controller    mometasone-formoterol (DULERA) 200-5 mcg/actuation inhaler Inhale 2 puffs into the lungs 2 times daily. controller    montelukast (SINGULAIR) 10 mg tablet Take 1 tablet every day by oral route for 30 days.    nitroGLYCERIN (NITROSTAT) 0.4 MG SL tablet Please see attached for detailed directions    NITROSTAT 0.4 mg SL tablet Take 2.5 tablets (1 mg total) by mouth every 5 (five) minutes as needed for Chest pain. No more than 3 tablets in 15 minutes.    nystatin (MYCOSTATIN) powder Apply topically 2 (two) times daily.    olopatadine (PATADAY) 0.2 % Drop Instill 1 drop into both eyes once daily    ondansetron (ZOFRAN-ODT) 8 MG TbDL Take 8 mg by mouth every 8 (eight) hours as needed.    pantoprazole (PROTONIX) 40 MG tablet TAKE 1 TABLET BY MOUTH DAILY IN THE MORNING    pantoprazole (PROTONIX) 40 MG tablet Take 1 tablet (40 mg total) by mouth every morning.    polyethylene glycol (GLYCOLAX) 17 gram PwPk Take 17 g by mouth 3 (three) times daily as needed (constipation/hard stools).    promethazine-codeine 6.25-10 mg/5 ml  "(PHENERGAN WITH CODEINE) 6.25-10 mg/5 mL syrup TAKE 5 mLs BY MOUTH EVERY 4 - 6 HOURS AS NEEDED    rOPINIRole (REQUIP) 4 MG tablet Take 1 tablet (4 mg total) by mouth once daily. Pt taking 4mg daily    sacubitriL-valsartan (ENTRESTO)  mg per tablet Take 1 tablet by mouth 2 (two) times daily.    sacubitriL-valsartan (ENTRESTO)  mg per tablet Take 1 tablet by mouth 2 (two) times a day.    scopolamine (TRANSDERM-SCOP) 1.3-1.5 mg (1 mg over 3 days) SMARTSIG:Patch(s) T-DERMAL Every 3 Days    SPIRIVA WITH HANDIHALER 18 mcg inhalation capsule INHALE 1 CAPSULE INTO THE LUNGS ONCE DAILY.    VENTOLIN HFA 90 mcg/actuation inhaler Inhale 2 puffs into the lungs 2 (two) times daily as needed.     walker Misc 1 Device by Misc.(Non-Drug; Combo Route) route as needed.     Family History       Problem Relation (Age of Onset)    Cancer Mother    Cirrhosis Brother    Diabetes Brother    Heart attack Father    Heart disease Father, Sister, Brother, Sister, Brother    Hypertension Father, Brother          Tobacco Use    Smoking status: Never    Smokeless tobacco: Never   Substance and Sexual Activity    Alcohol use: Yes     Comment: up to 1 yr ago drank 2-3 drinks on occasion but sporadic    Drug use: No    Sexual activity: Yes     Partners: Male     Review of Systems   Constitutional:  Negative for chills and fever.   Respiratory:  Positive for shortness of breath (improving). Negative for wheezing.    Cardiovascular:  Negative for chest pain and palpitations.   Gastrointestinal:  Negative for abdominal pain, blood in stool, constipation and nausea.   Genitourinary:  Negative for difficulty urinating.   Psychiatric/Behavioral:  Positive for dysphoric mood (feels "frustrated" because cardiology "wont give me what I need"-a transplant). Negative for sleep disturbance. The patient is not nervous/anxious.    Objective:     Vital Signs (Most Recent):  Temp: 97.8 °F (36.6 °C) (10/31/22 0814)  Pulse: 72 (10/31/22 0814)  Resp: 15 " (10/31/22 0814)  BP: 110/72 (10/31/22 0814)  SpO2: (!) 93 % (10/31/22 0814)   Vital Signs (24h Range):  Temp:  [97.2 °F (36.2 °C)-98.4 °F (36.9 °C)] 97.8 °F (36.6 °C)  Pulse:  [] 72  Resp:  [15-23] 15  SpO2:  [90 %-97 %] 93 %  BP: (100-120)/(56-75) 110/72     Weight: 97.5 kg (215 lb)  Body mass index is 34.7 kg/m².    Physical Exam  Vitals and nursing note reviewed.   Constitutional:       Appearance: She is well-developed. She is obese.   HENT:      Head: Normocephalic and atraumatic.      Right Ear: External ear normal.      Left Ear: External ear normal.      Nose: Nose normal.   Eyes:      Extraocular Movements: EOM normal.      Pupils: Pupils are equal, round, and reactive to light.   Neck:      Thyroid: No thyromegaly.      Vascular: No JVD.      Trachea: No tracheal deviation.   Cardiovascular:      Rate and Rhythm: Normal rate.      Heart sounds: Normal heart sounds. No murmur heard.  Pulmonary:      Effort: Pulmonary effort is normal. No respiratory distress.      Breath sounds: No wheezing or rales.   Abdominal:      General: Bowel sounds are normal. There is no distension.      Palpations: Abdomen is soft.      Tenderness: There is no abdominal tenderness.   Musculoskeletal:         General: Normal range of motion.      Cervical back: Normal range of motion and neck supple.      Right lower leg: No edema.      Left lower leg: No edema.   Lymphadenopathy:      Cervical: No cervical adenopathy.   Skin:     General: Skin is warm and dry.   Neurological:      Mental Status: She is alert and oriented to person, place, and time.      Cranial Nerves: No cranial nerve deficit.      Motor: No abnormal muscle tone.      Deep Tendon Reflexes: Reflexes are normal and symmetric.   Psychiatric:         Thought Content: Thought content normal.      Comments: Withdrawn but declines SSRI/SNRI for depression         CRANIAL NERVES     CN III, IV, VI   Pupils are equal, round, and reactive to light.  Extraocular  motions are normal.      Significant Labs:       10/30 mag 1.5  BMP  Lab Results   Component Value Date     10/31/2022    K 3.5 10/31/2022     10/31/2022    CO2 24 10/31/2022    BUN 62 (H) 10/31/2022    CREATININE 2.3 (H) 10/31/2022    CALCIUM 8.7 10/31/2022    ANIONGAP 14 10/31/2022    ESTGFRAFRICA 29.7 (A) 04/26/2022    EGFRNONAA 25.7 (A) 04/26/2022       Recent Labs   Lab 10/28/22  1426 10/29/22  0916 10/30/22  0233   CPK 45  45  --   --    CPKMB 3.6  --   --    TROPONINI 1.061*   < > 0.970*   MB 8.0*  --   --     < > = values in this interval not displayed.     Lab Results   Component Value Date    WBC 6.94 10/29/2022    HGB 8.6 (L) 10/29/2022    HCT 29.1 (L) 10/29/2022    MCV 59 (L) 10/29/2022     10/29/2022       BNP  Recent Labs   Lab 10/28/22  1426   BNP 2,771*     10/28 covid negative   Flu negative       Significant Imaging:     Cxr Opacification of left lower chest suggests atelectasis versus consolidated pneumonia with possible effusion    EKG Sinus rhythm with Premature atrial complexes   Possible Left atrial enlargement   Left axis deviation   LVH with QRS widening and repolarization abnormality   Prolonged QT   Abnormal ECG   When compared with ECG of 28-OCT-2022 14:37,   Premature ventricular complexes are no longer Present   Premature atrial complexes are now Present   Confirmed by Roney ROQUE MD ([id number]) on 10/29/2022 10:18:57 AM

## 2022-10-31 NOTE — CONSULTS
[place] - Med Surg ([facility])  Cardiology  Consult Note    Patient Name: Hafsa Hawley  MRN: [id number]  Admission Date: 10/28/2022  Hospital Length of Stay: 2 days  Code Status: Full Code   Attending Provider: Colt Evans MD   Consulting Provider: Debo Bernal NP  Primary Care Physician: Cristobal Ann MD  Principal Problem:NICM (nonischemic cardiomyopathy)        Inpatient consult to [facility]  Consult performed by: Debo Bernal NP  Consult ordered by: Colt Evans MD      Subjective:     Chief Complaint:  SOB     HPI: 58 y/o female with h/o combined systolic and diastolic CHF(EF 25% 3/2021), re-presents to the ED 24 hours later with persistent SOB not relieved with IV lasix in ED yesterday and trial of outpt diuresis at home. Her BNP is 2700, 2200 yesterday. Creatinine 1.8--- her baseline. TPN 1.061. 1.061 yesterday. Her EKG has prolonged QT and PVC's. No ST changes consistent with an infarct pattern. She says she has not taken her metolazone in about 2 weeks. She thinks this may be why she tipped over the edge. Admitted for supervised IV diuresis on telemetry    Hospital Course:  10/29: IV diuresis. Neg 2L. CHF medications optimized.   10/30: [facility] consulted for dobutamine recommendations/considerations in setting of hypotension. Bidil on hold   10/31: Diuretics are on hold due to worsening renal function and labile BP    On exam, pt appears very anxious. Continues to endorese SOB however, not appreciated on PE. Tangential responses.     Past Medical History:   Diagnosis Date    Anemia     Anticoagulant long-term use     Arthritis     Atrial fibrillation     CKD (chronic kidney disease), stage IV 5/8/2018    Congestive heart failure     COPD (chronic obstructive pulmonary disease)     Deep vein thrombosis     elevated bilirubin d/t Gilbert's syndrome     confirmed by [place] genetic testing, evaluated by hepatology    Encounter for blood transfusion     GERD (gastroesophageal  reflux disease)     Hypertension     Hypertensive cardiovascular-renal disease, stage 1-4 or unspecified chronic kidney disease, with heart failure 3/4/2022    Pheochromocytoma, malignant     Restrictive lung disease 4/26/2022    Right kidney mass     Sleep apnea     Thalassemia trait, alpha     Thyroid disease     Type 2 diabetes mellitus with hyperglycemia, without long-term current use of insulin 8/13/2020       Past Surgical History:   Procedure Laterality Date    APPENDECTOMY      BONE MARROW BIOPSY      CARDIAC DEFIBRILLATOR PLACEMENT Left 12/2016    CARDIAC ELECTROPHYSIOLOGY MAPPING AND ABLATION      CARDIAC ELECTROPHYSIOLOGY MAPPING AND ABLATION      COLONOSCOPY N/A 5/6/2022    Procedure: COLONOSCOPY;  Surgeon: Arely Betancourt MD;  Location: [facility] ENDO (2ND FLR);  Service: Endoscopy;  Laterality: N/A;  heart transplant candidate/ EF 25% - 2nd floor/ defib - Biotron[facility] - ERW  Eliquis - per Dr. Cortez with [facility], Pt ok to hold Eliquis x 2 days prior-see media tab-outside correspondence dated 12/30/21  - ER[facility]  verbal instructions/portal instructions/email instructions - s    EYE SURGERY      due to running tears    FRACTURE SURGERY Left     hand 5th digit    HYSTERECTOMY      KNEE SURGERY Left 2016    hematoma    LIVER BIOPSY  10/24/2018    Minimal steatosis, predominantly macrovesicular, 1%, Minimal nonspecific portal inflammation, no fibrosis. No findings on biopsy to explain elevated bilirubin levels. Could be d/t Gilbert's =?- hemolysis    RIGHT HEART CATHETERIZATION Right 12/07/2021    Procedure: INSERTION, CATHETER, RIGHT HEART;  Surgeon: Irving Cardenas MD;  Location: [facility] CATH LAB;  Service: Cardiology;  Laterality: Right;    TRANSJUGULAR BIOPSY OF LIVER N/A 10/24/2018    Procedure: BIOPSY, LIVER, TRANSJUGULAR APPROACH;  Surgeon: Carmen Diagnostic Provider;  Location: [facility] OR [facility] FLR;  Service: Radiology;  Laterality: N/A;       Review of patient's allergies indicates:   Allergen Reactions     "Penicillins Hives    Iodinated contrast media Nausea And Vomiting    Oxycodone-acetaminophen Other (See Comments)     Nausea, Dizziness, Anxiety.  "I don't like how it makes me feel."   Given Hydromorphone 0.5mg IVP  Without problems.  Other reaction(s): Other (See Comments)    Diovan hct [valsartan-hydrochlorothiazide] Other (See Comments)     Causes coughing    Irinotecan      Pt has homozygosity for the TA7 promoter variant that places this individual at significantly increased risk for   severe neutropenia (grade 4) when treated with the standard dose of irinotecan (risk approximately 50%).   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 Metabolism of other drugs not listed here may also be impacted by UGT1A1 enzyme activity.       Tramadol Nausea And Vomiting     Other reaction(s): Other (See Comments)       Current Facility-Administered Medications on File Prior to Encounter   Medication    0.9%  NaCl infusion    vancomycin in dextrose 5 % 1 gram/250 mL IVPB 1,000 mg     Current Outpatient Medications on File Prior to Encounter   Medication Sig    albuterol (ACCUNEB) 0.63 mg/3 mL Nebu Take 3 mLs (0.63 mg total) by nebulization every 6 (six) hours as needed. Rescue    albuterol-ipratropium (DUO-NEB) 2.5 mg-0.5 mg/3 mL nebulizer solution Take 3 mLs by nebulization 2 (two) times a day.    allopurinoL (ZYLOPRIM) 100 MG tablet TAKE 2 TABLETS BY MOUTH ONCE DAILY    allopurinoL (ZYLOPRIM) 100 MG tablet Take 2 tablets (200 mg total) by mouth once daily.    ALPRAZolam (XANAX) 2 MG Tab Take 2 mg by mouth 2 (two) times daily as needed.    apixaban (ELIQUIS) 2.5 mg Tab Take 1 tablet (2.5 mg total) by mouth 2 (two) times a day. Decrease in dose (Patient taking differently: Take 5 mg by mouth 2 (two) times a day.)    atorvastatin (LIPITOR) 40 MG tablet Take 1 tablet (40 mg total) by mouth every evening.    " atorvastatin (LIPITOR) 40 MG tablet Take 1 tablet (40 mg total) by mouth every evening.    b complex vitamins tablet Take 1 tablet by mouth once daily.    blood sugar diagnostic (ACCU-CHEK GUIDE TEST STRIPS) Strp 1 strip by Other route 3 (three) times daily.    blood sugar diagnostic (BLOOD GLUCOSE TEST) Strp 1 strip by Misc.(Non-Drug; Combo Route) route 3 (three) times daily.    blood-glucose meter kit Use as instructed    colchicine (COLCRYS) 0.6 mg tablet TAKE 1 TABLET BY MOUTH TWICE A DAY    diclofenac sodium (VOLTAREN) 1 % Gel APPLY 2 G TOPICALLY 3 (THREE) TIMES DAILY AS NEEDED (PAIN).    diltiazem HCl (DILTIAZEM 2% CREAM) Apply topically 3 (three) times daily. Apply topically to anal area.    ELIQUIS 5 mg Tab TAKE 1 TABLET BY MOUTH TWICE A DAY    empagliflozin (JARDIANCE) 25 mg tablet Jardiance 25 mg tablet    ergocalciferol (ERGOCALCIFEROL) 50,000 unit Cap Take 1 capsule (50,000 Units total) by mouth every 7 days.    ergocalciferol (ERGOCALCIFEROL) 50,000 unit Cap Take 1 capsule (50,000 Units total) by mouth every 7 days.    ferrous sulfate 325 (65 FE) MG EC tablet Take 1 tablet (325 mg total) by mouth once daily.    fluticasone propionate (FLONASE) 50 mcg/actuation nasal spray 2 sprays by Each Nostril route daily as needed for Rhinitis.     furosemide (LASIX) 80 MG tablet Take 1 tablet (80 mg total) by mouth once daily. Use afternoon dose if needed    glimepiride (AMARYL) 2 MG tablet TAKE 1 TABLET BY MOUTH EVERY DAY WITH BREAKFAST    glimepiride (AMARYL) 2 MG tablet Take 1 tablet (2 mg total) by mouth once daily with breakfast    hydrocortisone 2.5 % cream APPLY TOPICALLY 2 (TWO) TIMES DAILY AS NEEDED (HEMORRHOIDS).    lancets (ACCU-CHEK SOFTCLIX LANCETS) Misc 1 Device by Misc.(Non-Drug; Combo Route) route 3 (three) times daily.    LIDOcaine (LIDODERM) 5 % Place 1 patch onto the skin once daily. Remove & Discard patch within 12 hours or as directed by MD    LIDOcaine (LIDODERM) 5 % Place 1 patch onto the  skin once daily. Remove & discard patch within 12 hours or as directed by MD    linaCLOtide (LINZESS) 145 mcg Cap capsule Take 1 capsule (145 mcg total) by mouth before breakfast. Hold if diarrhea    metOLazone (ZAROXOLYN) 5 MG tablet Take 5 mg by mouth.    metoprolol succinate (TOPROL-XL) 100 MG 24 hr tablet Take 100 mg by mouth once daily.    mometasone-formoterol (DULERA) 200-5 mcg/actuation inhaler Inhale 2 puffs into the lungs 2 (two) times daily. Controller    mometasone-formoterol (DULERA) 200-5 mcg/actuation inhaler Inhale 2 puffs into the lungs 2 times daily. controller    montelukast (SINGULAIR) 10 mg tablet Take 1 tablet every day by oral route for 30 days.    nitroGLYCERIN (NITROSTAT) 0.4 MG SL tablet Please see attached for detailed directions    NITROSTAT 0.4 mg SL tablet Take 2.5 tablets (1 mg total) by mouth every 5 (five) minutes as needed for Chest pain. No more than 3 tablets in 15 minutes.    nystatin (MYCOSTATIN) powder Apply topically 2 (two) times daily.    olopatadine (PATADAY) 0.2 % Drop Instill 1 drop into both eyes once daily    ondansetron (ZOFRAN-ODT) 8 MG TbDL Take 8 mg by mouth every 8 (eight) hours as needed.    pantoprazole (PROTONIX) 40 MG tablet TAKE 1 TABLET BY MOUTH DAILY IN THE MORNING    pantoprazole (PROTONIX) 40 MG tablet Take 1 tablet (40 mg total) by mouth every morning.    polyethylene glycol (GLYCOLAX) 17 gram PwPk Take 17 g by mouth 3 (three) times daily as needed (constipation/hard stools).    promethazine-codeine 6.25-10 mg/5 ml (PHENERGAN WITH CODEINE) 6.25-10 mg/5 mL syrup TAKE 5 mLs BY MOUTH EVERY 4 - 6 HOURS AS NEEDED    rOPINIRole (REQUIP) 4 MG tablet Take 1 tablet (4 mg total) by mouth once daily. Pt taking 4mg daily    sacubitriL-valsartan (ENTRESTO)  mg per tablet Take 1 tablet by mouth 2 (two) times daily.    sacubitriL-valsartan (ENTRESTO)  mg per tablet Take 1 tablet by mouth 2 (two) times a day.    scopolamine (TRANSDERM-SCOP) 1.3-1.5 mg (1 mg  over 3 days) SMARTSIG:Patch(s) T-DERMAL Every 3 Days    SPIRIVA WITH HANDIHALER 18 mcg inhalation capsule INHALE 1 CAPSULE INTO THE LUNGS ONCE DAILY.    VENTOLIN HFA 90 mcg/actuation inhaler Inhale 2 puffs into the lungs 2 (two) times daily as needed.     walker Misc 1 Device by Misc.(Non-Drug; Combo Route) route as needed.     Family History       Problem Relation (Age of Onset)    Cancer Mother    Cirrhosis Brother    Diabetes Brother    Heart attack Father    Heart disease Father, Sister, Brother, Sister, Brother    Hypertension Father, Brother          Tobacco Use    Smoking status: Never    Smokeless tobacco: Never   Substance and Sexual Activity    Alcohol use: Yes     Comment: up to 1 yr ago drank 2-3 drinks on occasion but sporadic    Drug use: No    Sexual activity: Yes     Partners: Male     Review of Systems   Cardiovascular:  Positive for chest pain.   Respiratory:  Positive for shortness of breath.    All other systems reviewed and are negative.  Objective:     Vital Signs (Most Recent):  Temp: 96.3 °F (35.7 °C) (10/31/22 1145)  Pulse: 78 (10/31/22 1200)  Resp: 18 (10/31/22 1145)  BP: (!) 119/57 (10/31/22 1145)  SpO2: 99 % (10/31/22 1145)   Vital Signs (24h Range):  Temp:  [96.3 °F (35.7 °C)-98.4 °F (36.9 °C)] 96.3 °F (35.7 °C)  Pulse:  [] 78  Resp:  [15-20] 18  SpO2:  [90 %-99 %] 99 %  BP: (110-120)/(56-75) 119/57     Weight: 97.5 kg (215 lb)  Body mass index is 34.7 kg/m².    SpO2: 99 %  O2 Device (Oxygen Therapy): nasal cannula      Intake/Output Summary (Last 24 hours) at 10/31/2022 1301  Last data filed at 10/31/2022 0829  Gross per 24 hour   Intake --   Output 525 ml   Net -525 ml       Lines/Drains/Airways       Peripheral Intravenous Line  Duration                  Peripheral IV - Single Lumen 10/28/22 1438 18 G Right Antecubital 2 days                    Physical Exam  Vitals reviewed.   Constitutional:       Appearance: She is obese.   HENT:      Head: Normocephalic and atraumatic.       Mouth/Throat:      Mouth: Mucous membranes are moist.   Cardiovascular:      Rate and Rhythm: Normal rate and regular rhythm.   Pulmonary:      Effort: Pulmonary effort is normal.      Breath sounds: Normal breath sounds.   Musculoskeletal:         General: Normal range of motion.      Right lower leg: No edema.      Left lower leg: No edema.   Skin:     General: Skin is warm and dry.   Neurological:      Mental Status: She is alert.   Psychiatric:      Comments: Anxious and tangential        Significant Labs:   Recent Lab Results  (Last 5 results in the past 24 hours)        10/31/22  1132   10/31/22  0722   10/31/22  0544   10/30/22  2327   10/30/22  1947        Anion Gap     14   12         Aniso     Marked           Baso #     0.02           Basophil %     0.4           BUN     62   60         Calcium     8.7   8.6         Chloride     101   99         CO2     24   26         Creatinine     2.3   2.5         Differential Method     Automated           eGFR     24   22         Eos #     0.2           Eosinophil %     4.4           Glucose     123   246         Gran # (ANC)     3.0           Gran %     59.9           Hematocrit     32.8           Hemoglobin     9.5           Hypo     Moderate           Immature Grans (Abs)     0.02  Comment: Mild elevation in immature granulocytes is non specific and   can be seen in a variety of conditions including stress response,   acute inflammation, trauma and pregnancy. Correlation with other   laboratory and clinical findings is essential.             Immature Granulocytes     0.4           Lymph #     1.2           Lymph %     23.3           Magnesium       1.5         MCH     17.3           MCHC     29.0           MCV     60           Mono #     0.6           Mono %     11.6           MPV     SEE COMMENT  Comment: Result not available.           nRBC     1           Ovalocytes     Moderate           Platelet Estimate     Appears normal           Platelets     186            POCT Glucose 147   153       122       Poikilocytosis     Marked           Poly     Occasional           Potassium     3.5   3.4         RBC     5.50           RDW     27.1           Schistocytes     Present           Sodium     139   137         Spherocytes     Occasional           Target Cells     Occasional           Teardrop Cells     Occasional           Troponin I     0.846  Comment: The reference interval for Troponin I represents the 99th percentile   cutoff   for our facility and is consistent with 3rd generation assay   performance.             WBC     4.98                                  Significant Imaging: X-Ray: CXR: X-Ray Chest 1 View (CXR):   Results for orders placed or performed during the hospital encounter of 10/28/22   X-Ray Chest 1 View    Narrative    EXAMINATION:  XR CHEST 1 VIEW    CLINICAL HISTORY:  CP;    COMPARISON:  10/27/2022    FINDINGS:  Similar opacification of left lower chest with obscuration of the diaphragm.    Right lung and upper left lung are clear.  Enlarged cardiac silhouette, left ICD.      Impression    Opacification of left lower chest suggests atelectasis versus consolidated pneumonia with possible effusion      Electronically signed by: Sophia Green MD  Date:    10/28/2022  Time:    15:23   Echo  4/2022:  The study quality is below average.   Images are off axis.   The left ventricle is moderately enlarged. Global left ventricular systolic function is moderately decreased. The left ventricular ejection fraction is 25-30%. Noted moderate concentric left ventricular hypertrophy. Left ventricular diastolic function was not assessed due to limited study being performed.  The right ventricle is mildly enlarged.   Mild calcification of the mitral valve is noted.   Mild calcification of the aortic valve is noted with adequate cuspal excursion.   Trace tricuspid regurgitation.   The pulmonary artery systolic pressure is 25 mmHg.   A linear echo density is noted in the  right ventricle, suggestive of a ICD lead.The study quality is below average.   Images are off axis.   The left ventricle is moderately enlarged. Global left ventricular systolic function is moderately decreased. The left ventricular ejection fraction is 25-30%. Noted moderate concentric left ventricular hypertrophy. Left ventricular diastolic function was not assessed due to limited study being performed.  The right ventricle is mildly enlarged.   Mild calcification of the mitral valve is noted.   Mild calcification of the aortic valve is noted with adequate cuspal excursion.   Trace tricuspid regurgitation.   The pulmonary artery systolic pressure is 25 mmHg.   A linear echo density is noted in the right ventricle, suggestive of a ICD lead.      Assessment and Plan:     Active Diagnoses:    Diagnosis Date Noted POA    PRINCIPAL PROBLEM:  NICM (nonischemic cardiomyopathy) [I42.8] 10/27/2016 Yes     Chronic    Hypertensive cardiovascular-renal disease, stage 1-4 or unspecified chronic kidney disease, with heart failure [I13.0] 03/04/2022 Yes    Type 2 diabetes mellitus without complication, without long-term current use of insulin [E11.9] 08/13/2020 Yes    Chronic combined systolic and diastolic heart failure [I50.42] 03/16/2018 Yes    Paroxysmal atrial fibrillation [I48.0] 08/25/2016 Yes     Chronic    MELISSA (obstructive sleep apnea) [G47.33] 08/23/2016 Yes     Chronic    Essential hypertension [I10] 07/22/2016 Yes     Chronic    Elevated troponin [R77.8] 07/20/2016 Yes    Microcytic anemia [D50.9] 07/20/2016 Yes     Chronic      Problems Resolved During this Admission:    Diagnosis Date Noted Date Resolved POA    Fibromyalgia affecting multiple sites [M79.7] 06/15/2016 10/30/2022 Yes     Chronic       VTE Risk Mitigation (From admission, onward)           Ordered     apixaban tablet 2.5 mg  2 times daily         10/28/22 1943     IP VTE HIGH RISK PATIENT  Once         10/28/22 1915     Place sequential compression  device  Until discontinued         10/28/22 1915                Dx/Plan:  Acute on chronic diastolic HF  NICMO  Cardiorenal Syndrome  PAF:  MELISSA  HTN  Elevated TNI    Continue apixaban, atorvastatin, toprol, entresto as dosed  Hold diuresis for now.   Consider nephrology consult  Encourage CPAP compliance  Trivial elevation in TNI likely 2/2 demand ischemia in setting of fluid overload. Now trending down. Defer further ischemic work up to OP setting   Will follow  Debo Bernal NP  Cardiology   [place] - Med Surg (3rd Fl)

## 2022-10-31 NOTE — PROGRESS NOTES
[facility] ([facility])  [facility] Medicine  Progress Note    Patient Name: Hafsa Hawley  MRN: [id number]  Patient Class: IP- Inpatient   Admission Date: 10/28/2022  Length of Stay: 2 days  Attending Physician: Colt Evans MD  Primary Care Provider: Cristobal Ann MD        Subjective:     Principal Problem:NICM (nonischemic cardiomyopathy)        HPI:  56 y/o female with h/o combined systolic and diastolic CHF(EF 25% 3/2021), re-presents to the ED 24 hours later with persistent SOB not relieved with IV lasix in ED yesterday and trial of outpt diuresis at home. Her BNP is 2700, 2200 yesterday. Creatinine 1.8--- her baseline. TPN 1.061. 1.061 yesterday. Her EKG has prolonged QT and PVC's. No ST changes consistent with an infarct pattern. She says she has not taken her metolazone in about 2 weeks. She thinks this may be why she tipped over the edge. Admitted for supervised IV diuresis on telemetry.       Overview/Hospital Course:  Put out 2 liters since admit   BP is up this am. 170s/100s   Creatinine 1.9, 1.8 at admit   Reports some mild improvement in her SOB   97% on 3 liters       10/30  Diureses has slowed down a bit. Put out 900ml last 24 hours and is negative 300 for the shift.  BP much better after adding Bidil.  Ranging /54-58  She is 92-94% on 1.5 LPM   BMP pending at time of note.       10/31 pt was adfmitted for IV diureses with combined systolic/diastolic heart failure EF 25%. She was started on lasix 40mg IV in ER 10/28 and outpt -1925. Received 40mg IV lasix BID 10/29 and outpt was -300 and again yesterday had one dose IV lasix 40mg and outpt was -500-- total output --2725. She is feeling better though creat has increased from 1.8>2.5>2.3 today- further diuretics on hold. She does c./o chest pressure which she reports is her norm. On 2L NC sats 93% on 2L NC (has O2 as needed at home). Troponin had been trending down- can repeat today >> still trending down      Past Medical History:    Diagnosis Date    Anemia     Anticoagulant long-term use     Arthritis     Atrial fibrillation     CKD (chronic kidney disease), stage IV 5/8/2018    Congestive heart failure     COPD (chronic obstructive pulmonary disease)     Deep vein thrombosis     elevated bilirubin d/t Gilbert's syndrome     confirmed by [facility] genetic testing, evaluated by hepatology    Encounter for blood transfusion     GERD (gastroesophageal reflux disease)     Hypertension     Hypertensive cardiovascular-renal disease, stage 1-4 or unspecified chronic kidney disease, with heart failure 3/4/2022    Pheochromocytoma, malignant     Restrictive lung disease 4/26/2022    Right kidney mass     Sleep apnea     Thalassemia trait, alpha     Thyroid disease     Type 2 diabetes mellitus with hyperglycemia, without long-term current use of insulin 8/13/2020       Past Surgical History:   Procedure Laterality Date    APPENDECTOMY      BONE MARROW BIOPSY      CARDIAC DEFIBRILLATOR PLACEMENT Left 12/2016    CARDIAC ELECTROPHYSIOLOGY MAPPING AND ABLATION      CARDIAC ELECTROPHYSIOLOGY MAPPING AND ABLATION      COLONOSCOPY N/A 5/6/2022    Procedure: COLONOSCOPY;  Surgeon: Arely Betancourt MD;  Location: [facility] ([address] [place] [address]);  Service: Endoscopy;  Laterality: N/A;  heart transplant candidate/ EF 25% - 2nd floor/ defib - [facility] - [facility]  Eliquis - per Dr. Cortez with [facility], Pt ok to hold Eliquis x 2 days prior-see media tab-outside correspondence dated 12/30/21  - [facility]  verbal instructions/portal instructions/email instructions - s    EYE SURGERY      due to running tears    FRACTURE SURGERY Left     hand 5th digit    HYSTERECTOMY      KNEE SURGERY Left 2016    hematoma    LIVER BIOPSY  10/24/2018    Minimal steatosis, predominantly macrovesicular, 1%, Minimal nonspecific portal inflammation, no fibrosis. No findings on biopsy to explain elevated bilirubin levels. Could be d/t Gilbert's =?- hemolysis    RIGHT HEART  "CATHETERIZATION Right 12/07/2021    Procedure: INSERTION, CATHETER, RIGHT HEART;  Surgeon: Irving Cardenas MD;  Location: [facility] CATH LAB;  Service: Cardiology;  Laterality: Right;    TRANSJUGULAR BIOPSY OF LIVER N/A 10/24/2018    Procedure: BIOPSY, LIVER, TRANSJUGULAR APPROACH;  Surgeon: Carmen Diagnostic Provider;  Location: [facility] OR [address] [place] [address];  Service: Radiology;  Laterality: N/A;       Review of patient's allergies indicates:   Allergen Reactions    Penicillins Hives    Iodinated contrast media Nausea And Vomiting    Oxycodone-acetaminophen Other (See Comments)     Nausea, Dizziness, Anxiety.  "I don't like how it makes me feel."   Given Hydromorphone 0.5mg IVP  Without problems.  Other reaction(s): Other (See Comments)    Diovan hct [valsartan-hydrochlorothiazide] Other (See Comments)     Causes coughing    Irinotecan      Pt has homozygosity for the TA7 promoter variant that places this individual at significantly increased risk for   severe neutropenia (grade 4) when treated with the standard dose of irinotecan (risk approximately 50%).   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 Metabolism of other drugs not listed here may also be impacted by UGT1A1 enzyme activity.       Tramadol Nausea And Vomiting     Other reaction(s): Other (See Comments)       Current Facility-Administered Medications on File Prior to Encounter   Medication    0.9%  NaCl infusion    vancomycin in dextrose 5 % 1 gram/250 mL IVPB 1,000 mg     Current Outpatient Medications on File Prior to Encounter   Medication Sig    albuterol (ACCUNEB) 0.63 mg/3 mL Nebu Take 3 mLs (0.63 mg total) by nebulization every 6 (six) hours as needed. Rescue    albuterol-ipratropium (DUO-NEB) 2.5 mg-0.5 mg/3 mL nebulizer solution Take 3 mLs by nebulization 2 (two) times a day.    allopurinoL (ZYLOPRIM) 100 MG tablet TAKE 2 TABLETS BY " MOUTH ONCE DAILY    allopurinoL (ZYLOPRIM) 100 MG tablet Take 2 tablets (200 mg total) by mouth once daily.    ALPRAZolam (XANAX) 2 MG Tab Take 2 mg by mouth 2 (two) times daily as needed.    apixaban (ELIQUIS) 2.5 mg Tab Take 1 tablet (2.5 mg total) by mouth 2 (two) times a day. Decrease in dose (Patient taking differently: Take 5 mg by mouth 2 (two) times a day.)    atorvastatin (LIPITOR) 40 MG tablet Take 1 tablet (40 mg total) by mouth every evening.    atorvastatin (LIPITOR) 40 MG tablet Take 1 tablet (40 mg total) by mouth every evening.    b complex vitamins tablet Take 1 tablet by mouth once daily.    blood sugar diagnostic (ACCU-CHEK GUIDE TEST STRIPS) Strp 1 strip by Other route 3 (three) times daily.    blood sugar diagnostic (BLOOD GLUCOSE TEST) Strp 1 strip by Misc.(Non-Drug; Combo Route) route 3 (three) times daily.    blood-glucose meter kit Use as instructed    colchicine (COLCRYS) 0.6 mg tablet TAKE 1 TABLET BY MOUTH TWICE A DAY    diclofenac sodium (VOLTAREN) 1 % Gel APPLY 2 G TOPICALLY 3 (THREE) TIMES DAILY AS NEEDED (PAIN).    diltiazem HCl (DILTIAZEM 2% CREAM) Apply topically 3 (three) times daily. Apply topically to anal area.    ELIQUIS 5 mg Tab TAKE 1 TABLET BY MOUTH TWICE A DAY    empagliflozin (JARDIANCE) 25 mg tablet Jardiance 25 mg tablet    ergocalciferol (ERGOCALCIFEROL) 50,000 unit Cap Take 1 capsule (50,000 Units total) by mouth every 7 days.    ergocalciferol (ERGOCALCIFEROL) 50,000 unit Cap Take 1 capsule (50,000 Units total) by mouth every 7 days.    ferrous sulfate 325 (65 FE) MG EC tablet Take 1 tablet (325 mg total) by mouth once daily.    fluticasone propionate (FLONASE) 50 mcg/actuation nasal spray 2 sprays by Each Nostril route daily as needed for Rhinitis.     furosemide (LASIX) 80 MG tablet Take 1 tablet (80 mg total) by mouth once daily. Use afternoon dose if needed    glimepiride (AMARYL) 2 MG tablet TAKE 1 TABLET BY MOUTH EVERY DAY WITH BREAKFAST     glimepiride (AMARYL) 2 MG tablet Take 1 tablet (2 mg total) by mouth once daily with breakfast    hydrocortisone 2.5 % cream APPLY TOPICALLY 2 (TWO) TIMES DAILY AS NEEDED (HEMORRHOIDS).    lancets (ACCU-CHEK SOFTCLIX LANCETS) Misc 1 Device by Misc.(Non-Drug; Combo Route) route 3 (three) times daily.    LIDOcaine (LIDODERM) 5 % Place 1 patch onto the skin once daily. Remove & Discard patch within 12 hours or as directed by MD    LIDOcaine (LIDODERM) 5 % Place 1 patch onto the skin once daily. Remove & discard patch within 12 hours or as directed by MD    linaCLOtide (LINZESS) 145 mcg Cap capsule Take 1 capsule (145 mcg total) by mouth before breakfast. Hold if diarrhea    metOLazone (ZAROXOLYN) 5 MG tablet Take 5 mg by mouth.    metoprolol succinate (TOPROL-XL) 100 MG 24 hr tablet Take 100 mg by mouth once daily.    mometasone-formoterol (DULERA) 200-5 mcg/actuation inhaler Inhale 2 puffs into the lungs 2 (two) times daily. Controller    mometasone-formoterol (DULERA) 200-5 mcg/actuation inhaler Inhale 2 puffs into the lungs 2 times daily. controller    montelukast (SINGULAIR) 10 mg tablet Take 1 tablet every day by oral route for 30 days.    nitroGLYCERIN (NITROSTAT) 0.4 MG SL tablet Please see attached for detailed directions    NITROSTAT 0.4 mg SL tablet Take 2.5 tablets (1 mg total) by mouth every 5 (five) minutes as needed for Chest pain. No more than 3 tablets in 15 minutes.    nystatin (MYCOSTATIN) powder Apply topically 2 (two) times daily.    olopatadine (PATADAY) 0.2 % Drop Instill 1 drop into both eyes once daily    ondansetron (ZOFRAN-ODT) 8 MG TbDL Take 8 mg by mouth every 8 (eight) hours as needed.    pantoprazole (PROTONIX) 40 MG tablet TAKE 1 TABLET BY MOUTH DAILY IN THE MORNING    pantoprazole (PROTONIX) 40 MG tablet Take 1 tablet (40 mg total) by mouth every morning.    polyethylene glycol (GLYCOLAX) 17 gram PwPk Take 17 g by mouth 3 (three) times daily as needed (constipation/hard  "stools).    promethazine-codeine 6.25-10 mg/5 ml (PHENERGAN WITH CODEINE) 6.25-10 mg/5 mL syrup TAKE 5 mLs BY MOUTH EVERY 4 - 6 HOURS AS NEEDED    rOPINIRole (REQUIP) 4 MG tablet Take 1 tablet (4 mg total) by mouth once daily. Pt taking 4mg daily    sacubitriL-valsartan (ENTRESTO)  mg per tablet Take 1 tablet by mouth 2 (two) times daily.    sacubitriL-valsartan (ENTRESTO)  mg per tablet Take 1 tablet by mouth 2 (two) times a day.    scopolamine (TRANSDERM-SCOP) 1.3-1.5 mg (1 mg over 3 days) SMARTSIG:Patch(s) T-DERMAL Every 3 Days    SPIRIVA WITH HANDIHALER 18 mcg inhalation capsule INHALE 1 CAPSULE INTO THE LUNGS ONCE DAILY.    VENTOLIN HFA 90 mcg/actuation inhaler Inhale 2 puffs into the lungs 2 (two) times daily as needed.     walker Misc 1 Device by Misc.(Non-Drug; Combo Route) route as needed.     Family History       Problem Relation (Age of Onset)    Cancer Mother    Cirrhosis Brother    Diabetes Brother    Heart attack Father    Heart disease Father, Sister, Brother, Sister, Brother    Hypertension Father, Brother          Tobacco Use    Smoking status: Never    Smokeless tobacco: Never   Substance and Sexual Activity    Alcohol use: Yes     Comment: up to 1 yr ago drank 2-3 drinks on occasion but sporadic    Drug use: No    Sexual activity: Yes     Partners: Male     Review of Systems   Constitutional:  Negative for chills and fever.   Respiratory:  Positive for shortness of breath (improving). Negative for wheezing.    Cardiovascular:  Negative for chest pain and palpitations.   Gastrointestinal:  Negative for abdominal pain, blood in stool, constipation and nausea.   Genitourinary:  Negative for difficulty urinating.   Psychiatric/Behavioral:  Positive for dysphoric mood (feels "frustrated" because cardiology "wont give me what I need"-a transplant). Negative for sleep disturbance. The patient is not nervous/anxious.    Objective:     Vital Signs (Most Recent):  Temp: 97.8 °F " (36.6 °C) (10/31/22 0814)  Pulse: 72 (10/31/22 0814)  Resp: 15 (10/31/22 0814)  BP: 110/72 (10/31/22 0814)  SpO2: (!) 93 % (10/31/22 0814)   Vital Signs (24h Range):  Temp:  [97.2 °F (36.2 °C)-98.4 °F (36.9 °C)] 97.8 °F (36.6 °C)  Pulse:  [] 72  Resp:  [15-23] 15  SpO2:  [90 %-97 %] 93 %  BP: (100-120)/(56-75) 110/72     Weight: 97.5 kg (215 lb)  Body mass index is 34.7 kg/m².    Physical Exam  Vitals and nursing note reviewed.   Constitutional:       Appearance: She is well-developed. She is obese.   HENT:      Head: Normocephalic and atraumatic.      Right Ear: External ear normal.      Left Ear: External ear normal.      Nose: Nose normal.   Eyes:      Extraocular Movements: EOM normal.      Pupils: Pupils are equal, round, and reactive to light.   Neck:      Thyroid: No thyromegaly.      Vascular: No JVD.      Trachea: No tracheal deviation.   Cardiovascular:      Rate and Rhythm: Normal rate.      Heart sounds: Normal heart sounds. No murmur heard.  Pulmonary:      Effort: Pulmonary effort is normal. No respiratory distress.      Breath sounds: No wheezing or rales.   Abdominal:      General: Bowel sounds are normal. There is no distension.      Palpations: Abdomen is soft.      Tenderness: There is no abdominal tenderness.   Musculoskeletal:         General: Normal range of motion.      Cervical back: Normal range of motion and neck supple.      Right lower leg: No edema.      Left lower leg: No edema.   Lymphadenopathy:      Cervical: No cervical adenopathy.   Skin:     General: Skin is warm and dry.   Neurological:      Mental Status: She is alert and oriented to person, place, and time.      Cranial Nerves: No cranial nerve deficit.      Motor: No abnormal muscle tone.      Deep Tendon Reflexes: Reflexes are normal and symmetric.   Psychiatric:         Thought Content: Thought content normal.      Comments: Withdrawn but declines SSRI/SNRI for depression         CRANIAL NERVES     CN III, IV, VI    Pupils are equal, round, and reactive to light.  Extraocular motions are normal.      Significant Labs:       10/30 mag 1.5  BMP  Lab Results   Component Value Date     10/31/2022    K 3.5 10/31/2022     10/31/2022    CO2 24 10/31/2022    BUN 62 (H) 10/31/2022    CREATININE 2.3 (H) 10/31/2022    CALCIUM 8.7 10/31/2022    ANIONGAP 14 10/31/2022    ESTGFRAFRICA 29.7 (A) 04/26/2022    EGFRNONAA 25.7 (A) 04/26/2022       Recent Labs   Lab 10/28/22  1426 10/29/22  0916 10/30/22  0233   CPK 45  45  --   --    CPKMB 3.6  --   --    TROPONINI 1.061*   < > 0.970*   MB 8.0*  --   --     < > = values in this interval not displayed.     Lab Results   Component Value Date    WBC 6.94 10/29/2022    HGB 8.6 (L) 10/29/2022    HCT 29.1 (L) 10/29/2022    MCV 59 (L) 10/29/2022     10/29/2022       BNP  Recent Labs   Lab 10/28/22  1426   BNP 2,771*     10/28 covid negative   Flu negative       Significant Imaging:     Cxr Opacification of left lower chest suggests atelectasis versus consolidated pneumonia with possible effusion    EKG Sinus rhythm with Premature atrial complexes   Possible Left atrial enlargement   Left axis deviation   LVH with QRS widening and repolarization abnormality   Prolonged QT   Abnormal ECG   When compared with ECG of 28-OCT-2022 14:37,   Premature ventricular complexes are no longer Present   Premature atrial complexes are now Present   Confirmed by Roney ROQUE MD ([id number]) on 10/29/2022 10:18:57 AM      Assessment/Plan:      * NICM (nonischemic cardiomyopathy)  Last EF 30% per [facility] records  She sees Dr. Cortez   toptrinityol  entresto  Lasix 80 daily at home   Will start with 40mg IV BID here   Continue home meds   Dobutamine? [facility] to see Monday     10/31: [facility] following. Holding lasix. Monitor renal fxn. Cont other meds same dose for now per [facility] recommendations      Hypertensive cardiovascular-renal disease, stage 1-4 or unspecified chronic kidney disease, with heart failure  Currently at her  baseline creatinine, 1.8  Will monitor closely while diuresing   BMP  Lab Results   Component Value Date     10/31/2022    K 3.5 10/31/2022     10/31/2022    CO2 24 10/31/2022    BUN 62 (H) 10/31/2022    CREATININE 2.3 (H) 10/31/2022    CALCIUM 8.7 10/31/2022    ANIONGAP 14 10/31/2022    ESTGFRAFRICA 29.7 (A) 04/26/2022    EGFRNONAA 25.7 (A) 04/26/2022       10/31:  She is a little dry lasix held today and is better creat 2.5>2.3    Lasix held this am as her creat has bunmped. She ahs diuresed 2775 since admission. Bp this am 110/72, cont entresto and BB  Will not give back IVF just hold diuretic and let her naturally improve on her own. OK for PO fluids    Type 2 diabetes mellitus without complication, without long-term current use of insulin  Patient's FSGs are controlled on current medication regimen.  Last A1c reviewed-   Lab Results   Component Value Date    HGBA1C 6.5 (H) 08/23/2022     Most recent fingerstick glucose reviewed-   Recent Labs   Lab 10/30/22  1148 10/30/22  1615 10/30/22  1947 10/31/22  0722   POCTGLUCOSE 164* 177* 122* 153*     Current correctional scale  Medium  Increase anti-hyperglycemic dose as follows-   Antihyperglycemics (From admission, onward)    Start     Stop Route Frequency Ordered    10/29/22 0715  insulin aspart U-100 pen 7 Units         -- SubQ 3 times daily with meals 10/28/22 1915    10/28/22 1914  insulin aspart U-100 pen 0-5 Units         -- SubQ Before meals & nightly PRN 10/28/22 1915        Hold Oral hypoglycemics while patient is in the hospital. this am    Chronic combined systolic and diastolic heart failure  Last EF 25% 3/2021. [facility] may have more recent one   She sees Dr. Cortez   toptrinityol  entresto  Lasix 80 daily at home   Will start with 40mg IV BID here   Continue home meds   Add Bidil 10/29  D/c Bidil 10/30 as she has become mildly hypotensive after diuresis commenced.      10/31 Lasix held this am as her creat has bunmped. She ahs diuresed 1497  since admission. Bp this am 110/72, cont entresto and BB  Will not give back IVF just hold diuretic and let her naturally improve on her own. OK for PO fluids  Has home oxygen but was not using constantly, wean today as toelrated    Paroxysmal atrial fibrillation  Patient with Permanent atrial fibrillation which is controlled currently with Beta Blocker. Patient is currently in sinus rhythm.[id number] Score: 3. HASBLED Score: . Anticoagulation indicated. Anticoagulation done with eliquis.        MELISSA (obstructive sleep apnea)  She will call  to bring in her home cpap. Sounds like she has an auto cpap unit. Has not set it up at home yet. We can have respiratory set up here. Used it last night- uncomfortable but will cont to try         Essential hypertension  entresto  Metoprolol  Add bidil 10/29  D/C bidil 10/30 as her BP this morning is 88/50  Hold BB, entresto for systolic less than 90   10/31 bp 110/72    Microcytic anemia  Lab Results   Component Value Date    IRON 85 02/15/2022    TRANSFERRIN 209 02/15/2022    TIBC 309 02/15/2022    FESATURATED 28 02/15/2022      Continue home iron      Elevated troponin  Due to her dilated cardiomyopathy and persistently low EF. Chronically leaks TPN. It is trending down...   Recent Labs   Lab 10/31/22  0544   TROPONINI 0.846*     Repeat today still trending down           VTE Risk Mitigation (From admission, onward)         Ordered     apixaban tablet 2.5 mg  2 times daily         10/28/22 1943     IP VTE HIGH RISK PATIENT  Once         10/28/22 1915     Place sequential compression device  Until discontinued         10/28/22 1915                Discharge Planning   CHIQUI:      Code Status: Full Code   Is the patient medically ready for discharge?:     Reason for patient still in hospital (select all that apply): Treatment                     Ines Fonseca MD  Department of Hospital Medicine   [facility] - Med Surg (3rd Fl)

## 2022-11-01 PROBLEM — F06.31 DEPRESSION DUE TO PHYSICAL ILLNESS: Status: ACTIVE | Noted: 2018-05-08

## 2022-11-01 LAB
ANION GAP SERPL CALC-SCNC: 12 MMOL/L (ref 8–16)
BUN SERPL-MCNC: 61 MG/DL (ref 6–20)
CALCIUM SERPL-MCNC: 8.9 MG/DL (ref 8.7–10.5)
CHLORIDE SERPL-SCNC: 101 MMOL/L (ref 95–110)
CO2 SERPL-SCNC: 26 MMOL/L (ref 23–29)
CREAT SERPL-MCNC: 2.2 MG/DL (ref 0.5–1.4)
EST. GFR  (NO RACE VARIABLE): 26 ML/MIN/1.73 M^2
GLUCOSE SERPL-MCNC: 149 MG/DL (ref 70–110)
MAGNESIUM SERPL-MCNC: 1.7 MG/DL (ref 1.6–2.6)
MAGNESIUM SERPL-MCNC: 2.4 MG/DL (ref 1.6–2.6)
POCT GLUCOSE: 139 MG/DL (ref 70–110)
POCT GLUCOSE: 145 MG/DL (ref 70–110)
POCT GLUCOSE: 154 MG/DL (ref 70–110)
POCT GLUCOSE: 174 MG/DL (ref 70–110)
POTASSIUM SERPL-SCNC: 4.1 MMOL/L (ref 3.5–5.1)
SODIUM SERPL-SCNC: 139 MMOL/L (ref 136–145)

## 2022-11-01 PROCEDURE — 99233 PR SUBSEQUENT HOSPITAL CARE,LEVL III: ICD-10-PCS | Mod: ,,, | Performed by: INTERNAL MEDICINE

## 2022-11-01 PROCEDURE — 63600175 PHARM REV CODE 636 W HCPCS: Performed by: NURSE PRACTITIONER

## 2022-11-01 PROCEDURE — 94761 N-INVAS EAR/PLS OXIMETRY MLT: CPT

## 2022-11-01 PROCEDURE — 83735 ASSAY OF MAGNESIUM: CPT | Mod: 91 | Performed by: FAMILY MEDICINE

## 2022-11-01 PROCEDURE — 25000003 PHARM REV CODE 250: Performed by: NURSE PRACTITIONER

## 2022-11-01 PROCEDURE — 99900035 HC TECH TIME PER 15 MIN (STAT)

## 2022-11-01 PROCEDURE — 27000221 HC OXYGEN, UP TO 24 HOURS

## 2022-11-01 PROCEDURE — 36415 COLL VENOUS BLD VENIPUNCTURE: CPT | Performed by: NURSE PRACTITIONER

## 2022-11-01 PROCEDURE — 63600175 PHARM REV CODE 636 W HCPCS: Performed by: FAMILY MEDICINE

## 2022-11-01 PROCEDURE — 99233 SBSQ HOSP IP/OBS HIGH 50: CPT | Mod: ,,, | Performed by: INTERNAL MEDICINE

## 2022-11-01 PROCEDURE — 25000003 PHARM REV CODE 250: Performed by: FAMILY MEDICINE

## 2022-11-01 PROCEDURE — 94640 AIRWAY INHALATION TREATMENT: CPT

## 2022-11-01 PROCEDURE — 80048 BASIC METABOLIC PNL TOTAL CA: CPT | Performed by: NURSE PRACTITIONER

## 2022-11-01 PROCEDURE — 36415 COLL VENOUS BLD VENIPUNCTURE: CPT | Performed by: FAMILY MEDICINE

## 2022-11-01 PROCEDURE — 83735 ASSAY OF MAGNESIUM: CPT | Performed by: NURSE PRACTITIONER

## 2022-11-01 PROCEDURE — 11000001 HC ACUTE MED/SURG PRIVATE ROOM

## 2022-11-01 RX ORDER — AMIODARONE HYDROCHLORIDE 200 MG/1
400 TABLET ORAL DAILY
Status: DISCONTINUED | OUTPATIENT
Start: 2022-11-08 | End: 2022-11-03 | Stop reason: HOSPADM

## 2022-11-01 RX ORDER — DULOXETIN HYDROCHLORIDE 30 MG/1
30 CAPSULE, DELAYED RELEASE ORAL DAILY
Status: DISCONTINUED | OUTPATIENT
Start: 2022-11-01 | End: 2022-11-03 | Stop reason: HOSPADM

## 2022-11-01 RX ORDER — MAGNESIUM SULFATE HEPTAHYDRATE 40 MG/ML
2 INJECTION, SOLUTION INTRAVENOUS ONCE
Status: COMPLETED | OUTPATIENT
Start: 2022-11-01 | End: 2022-11-01

## 2022-11-01 RX ORDER — METOPROLOL SUCCINATE 50 MG/1
50 TABLET, EXTENDED RELEASE ORAL DAILY
Status: DISCONTINUED | OUTPATIENT
Start: 2022-11-01 | End: 2022-11-03 | Stop reason: HOSPADM

## 2022-11-01 RX ORDER — AMIODARONE HYDROCHLORIDE 200 MG/1
400 TABLET ORAL 2 TIMES DAILY
Status: DISCONTINUED | OUTPATIENT
Start: 2022-11-01 | End: 2022-11-03 | Stop reason: HOSPADM

## 2022-11-01 RX ORDER — AMIODARONE HYDROCHLORIDE 200 MG/1
200 TABLET ORAL DAILY
Status: DISCONTINUED | OUTPATIENT
Start: 2022-11-11 | End: 2022-11-03 | Stop reason: HOSPADM

## 2022-11-01 RX ADMIN — MUPIROCIN: 20 OINTMENT TOPICAL at 08:11

## 2022-11-01 RX ADMIN — APIXABAN 2.5 MG: 2.5 TABLET, FILM COATED ORAL at 09:11

## 2022-11-01 RX ADMIN — INSULIN ASPART 7 UNITS: 100 INJECTION, SOLUTION INTRAVENOUS; SUBCUTANEOUS at 05:11

## 2022-11-01 RX ADMIN — INSULIN ASPART 7 UNITS: 100 INJECTION, SOLUTION INTRAVENOUS; SUBCUTANEOUS at 08:11

## 2022-11-01 RX ADMIN — SACUBITRIL AND VALSARTAN 1 TABLET: 49; 51 TABLET, FILM COATED ORAL at 08:11

## 2022-11-01 RX ADMIN — PANTOPRAZOLE SODIUM 40 MG: 40 TABLET, DELAYED RELEASE ORAL at 09:11

## 2022-11-01 RX ADMIN — ONDANSETRON 8 MG: 2 INJECTION INTRAMUSCULAR; INTRAVENOUS at 08:11

## 2022-11-01 RX ADMIN — MUPIROCIN: 20 OINTMENT TOPICAL at 09:11

## 2022-11-01 RX ADMIN — ATORVASTATIN CALCIUM 40 MG: 20 TABLET, FILM COATED ORAL at 08:11

## 2022-11-01 RX ADMIN — METOPROLOL SUCCINATE 50 MG: 50 TABLET, EXTENDED RELEASE ORAL at 09:11

## 2022-11-01 RX ADMIN — FERROUS SULFATE TAB 325 MG (65 MG ELEMENTAL FE) 1 EACH: 325 (65 FE) TAB at 09:11

## 2022-11-01 RX ADMIN — SACUBITRIL AND VALSARTAN 1 TABLET: 49; 51 TABLET, FILM COATED ORAL at 09:11

## 2022-11-01 RX ADMIN — TIOTROPIUM BROMIDE INHALATION SPRAY 2 PUFF: 3.12 SPRAY, METERED RESPIRATORY (INHALATION) at 08:11

## 2022-11-01 RX ADMIN — INSULIN ASPART 7 UNITS: 100 INJECTION, SOLUTION INTRAVENOUS; SUBCUTANEOUS at 12:11

## 2022-11-01 RX ADMIN — AMIODARONE HYDROCHLORIDE 400 MG: 200 TABLET ORAL at 09:11

## 2022-11-01 RX ADMIN — MAGNESIUM SULFATE HEPTAHYDRATE 2 G: 40 INJECTION, SOLUTION INTRAVENOUS at 08:11

## 2022-11-01 RX ADMIN — APIXABAN 2.5 MG: 2.5 TABLET, FILM COATED ORAL at 08:11

## 2022-11-01 RX ADMIN — AMIODARONE HYDROCHLORIDE 400 MG: 200 TABLET ORAL at 08:11

## 2022-11-01 RX ADMIN — DULOXETINE 30 MG: 30 CAPSULE, DELAYED RELEASE ORAL at 09:11

## 2022-11-01 NOTE — NURSING
Pt seen to have a 17 beat run of VT. Notified MD. Stat Mag and [facility] call ordered.    Called [facility], awaiting return call from on-call provider.

## 2022-11-01 NOTE — ASSESSMENT & PLAN NOTE
Lab Results   Component Value Date    IRON 85 02/15/2022    TRANSFERRIN 209 02/15/2022    TIBC 309 02/15/2022    FESATURATED 28 02/15/2022      Continue home iron.

## 2022-11-01 NOTE — ASSESSMENT & PLAN NOTE
She will call  to bring in her home cpap. Sounds like she has an auto cpap unit. Has not set it up at home yet. We can have respiratory set up here. Used it last night- uncomfortable but will cont to try .

## 2022-11-01 NOTE — SUBJECTIVE & OBJECTIVE
Past Medical History:   Diagnosis Date    Anemia     Anticoagulant long-term use     Arthritis     Atrial fibrillation     CKD (chronic kidney disease), stage IV 5/8/2018    Congestive heart failure     COPD (chronic obstructive pulmonary disease)     Deep vein thrombosis     elevated bilirubin d/t Gilbert's syndrome     confirmed by [facility] genetic testing, evaluated by hepatology    Encounter for blood transfusion     GERD (gastroesophageal reflux disease)     Hypertension     Hypertensive cardiovascular-renal disease, stage 1-4 or unspecified chronic kidney disease, with heart failure 3/4/2022    Pheochromocytoma, malignant     Restrictive lung disease 4/26/2022    Right kidney mass     Sleep apnea     Thalassemia trait, alpha     Thyroid disease     Type 2 diabetes mellitus with hyperglycemia, without long-term current use of insulin 8/13/2020       Past Surgical History:   Procedure Laterality Date    APPENDECTOMY      BONE MARROW BIOPSY      CARDIAC DEFIBRILLATOR PLACEMENT Left 12/2016    CARDIAC ELECTROPHYSIOLOGY MAPPING AND ABLATION      CARDIAC ELECTROPHYSIOLOGY MAPPING AND ABLATION      COLONOSCOPY N/A 5/6/2022    Procedure: COLONOSCOPY;  Surgeon: Arely Betancourt MD;  Location: [facility] ([address] [place] [address]);  Service: Endoscopy;  Laterality: N/A;  heart transplant candidate/ EF 25% - 2nd floor/ defib - [facility] - [facility]  Eliquis - per Dr. Cortez with [facility], Pt ok to hold Eliquis x 2 days prior-see media tab-outside correspondence dated 12/30/21  - [facility]  verbal instructions/portal instructions/email instructions - s    EYE SURGERY      due to running tears    FRACTURE SURGERY Left     hand 5th digit    HYSTERECTOMY      KNEE SURGERY Left 2016    hematoma    LIVER BIOPSY  10/24/2018    Minimal steatosis, predominantly macrovesicular, 1%, Minimal nonspecific portal inflammation, no fibrosis. No findings on biopsy to explain elevated bilirubin levels. Could be d/t Gilbert's =?- hemolysis    RIGHT HEART CATHETERIZATION  "Right 12/07/2021    Procedure: INSERTION, CATHETER, RIGHT HEART;  Surgeon: Irving Cardenas MD;  Location: [facility] CATH LAB;  Service: Cardiology;  Laterality: Right;    TRANSJUGULAR BIOPSY OF LIVER N/A 10/24/2018    Procedure: BIOPSY, LIVER, TRANSJUGULAR APPROACH;  Surgeon: Carmen Diagnostic Provider;  Location: [facility] OR [address] [place] [address];  Service: Radiology;  Laterality: N/A;       Review of patient's allergies indicates:   Allergen Reactions    Penicillins Hives    Iodinated contrast media Nausea And Vomiting    Oxycodone-acetaminophen Other (See Comments)     Nausea, Dizziness, Anxiety.  "I don't like how it makes me feel."   Given Hydromorphone 0.5mg IVP  Without problems.  Other reaction(s): Other (See Comments)    Diovan hct [valsartan-hydrochlorothiazide] Other (See Comments)     Causes coughing    Irinotecan      Pt has homozygosity for the TA7 promoter variant that places this individual at significantly increased risk for   severe neutropenia (grade 4) when treated with the standard dose of irinotecan (risk approximately 50%).   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 Metabolism of other drugs not listed here may also be impacted by UGT1A1 enzyme activity.       Tramadol Nausea And Vomiting     Other reaction(s): Other (See Comments)       Current Facility-Administered Medications on File Prior to Encounter   Medication    0.9%  NaCl infusion    vancomycin in dextrose 5 % 1 gram/250 mL IVPB 1,000 mg     Current Outpatient Medications on File Prior to Encounter   Medication Sig    albuterol (ACCUNEB) 0.63 mg/3 mL Nebu Take 3 mLs (0.63 mg total) by nebulization every 6 (six) hours as needed. Rescue    albuterol-ipratropium (DUO-NEB) 2.5 mg-0.5 mg/3 mL nebulizer solution Take 3 mLs by nebulization 2 (two) times a day.    allopurinoL (ZYLOPRIM) 100 MG tablet TAKE 2 TABLETS BY MOUTH ONCE DAILY    " allopurinoL (ZYLOPRIM) 100 MG tablet Take 2 tablets (200 mg total) by mouth once daily.    ALPRAZolam (XANAX) 2 MG Tab Take 2 mg by mouth 2 (two) times daily as needed.    apixaban (ELIQUIS) 2.5 mg Tab Take 1 tablet (2.5 mg total) by mouth 2 (two) times a day. Decrease in dose (Patient taking differently: Take 5 mg by mouth 2 (two) times a day.)    atorvastatin (LIPITOR) 40 MG tablet Take 1 tablet (40 mg total) by mouth every evening.    atorvastatin (LIPITOR) 40 MG tablet Take 1 tablet (40 mg total) by mouth every evening.    b complex vitamins tablet Take 1 tablet by mouth once daily.    blood sugar diagnostic (ACCU-CHEK GUIDE TEST STRIPS) Strp 1 strip by Other route 3 (three) times daily.    blood sugar diagnostic (BLOOD GLUCOSE TEST) Strp 1 strip by Misc.(Non-Drug; Combo Route) route 3 (three) times daily.    blood-glucose meter kit Use as instructed    colchicine (COLCRYS) 0.6 mg tablet TAKE 1 TABLET BY MOUTH TWICE A DAY    diclofenac sodium (VOLTAREN) 1 % Gel APPLY 2 G TOPICALLY 3 (THREE) TIMES DAILY AS NEEDED (PAIN).    diltiazem HCl (DILTIAZEM 2% CREAM) Apply topically 3 (three) times daily. Apply topically to anal area.    ELIQUIS 5 mg Tab TAKE 1 TABLET BY MOUTH TWICE A DAY    empagliflozin (JARDIANCE) 25 mg tablet Jardiance 25 mg tablet    ergocalciferol (ERGOCALCIFEROL) 50,000 unit Cap Take 1 capsule (50,000 Units total) by mouth every 7 days.    ergocalciferol (ERGOCALCIFEROL) 50,000 unit Cap Take 1 capsule (50,000 Units total) by mouth every 7 days.    ferrous sulfate 325 (65 FE) MG EC tablet Take 1 tablet (325 mg total) by mouth once daily.    fluticasone propionate (FLONASE) 50 mcg/actuation nasal spray 2 sprays by Each Nostril route daily as needed for Rhinitis.     furosemide (LASIX) 80 MG tablet Take 1 tablet (80 mg total) by mouth once daily. Use afternoon dose if needed    glimepiride (AMARYL) 2 MG tablet TAKE 1 TABLET BY MOUTH EVERY DAY WITH BREAKFAST    glimepiride (AMARYL) 2 MG tablet Take 1  tablet (2 mg total) by mouth once daily with breakfast    hydrocortisone 2.5 % cream APPLY TOPICALLY 2 (TWO) TIMES DAILY AS NEEDED (HEMORRHOIDS).    lancets (ACCU-CHEK SOFTCLIX LANCETS) Misc 1 Device by Misc.(Non-Drug; Combo Route) route 3 (three) times daily.    LIDOcaine (LIDODERM) 5 % Place 1 patch onto the skin once daily. Remove & Discard patch within 12 hours or as directed by MD    LIDOcaine (LIDODERM) 5 % Place 1 patch onto the skin once daily. Remove & discard patch within 12 hours or as directed by MD    linaCLOtide (LINZESS) 145 mcg Cap capsule Take 1 capsule (145 mcg total) by mouth before breakfast. Hold if diarrhea    metOLazone (ZAROXOLYN) 5 MG tablet Take 5 mg by mouth.    metoprolol succinate (TOPROL-XL) 100 MG 24 hr tablet Take 100 mg by mouth once daily.    mometasone-formoterol (DULERA) 200-5 mcg/actuation inhaler Inhale 2 puffs into the lungs 2 (two) times daily. Controller    mometasone-formoterol (DULERA) 200-5 mcg/actuation inhaler Inhale 2 puffs into the lungs 2 times daily. controller    montelukast (SINGULAIR) 10 mg tablet Take 1 tablet every day by oral route for 30 days.    nitroGLYCERIN (NITROSTAT) 0.4 MG SL tablet Please see attached for detailed directions    NITROSTAT 0.4 mg SL tablet Take 2.5 tablets (1 mg total) by mouth every 5 (five) minutes as needed for Chest pain. No more than 3 tablets in 15 minutes.    nystatin (MYCOSTATIN) powder Apply topically 2 (two) times daily.    olopatadine (PATADAY) 0.2 % Drop Instill 1 drop into both eyes once daily    ondansetron (ZOFRAN-ODT) 8 MG TbDL Take 8 mg by mouth every 8 (eight) hours as needed.    pantoprazole (PROTONIX) 40 MG tablet TAKE 1 TABLET BY MOUTH DAILY IN THE MORNING    pantoprazole (PROTONIX) 40 MG tablet Take 1 tablet (40 mg total) by mouth every morning.    polyethylene glycol (GLYCOLAX) 17 gram PwPk Take 17 g by mouth 3 (three) times daily as needed (constipation/hard stools).    promethazine-codeine 6.25-10 mg/5 ml  "(PHENERGAN WITH CODEINE) 6.25-10 mg/5 mL syrup TAKE 5 mLs BY MOUTH EVERY 4 - 6 HOURS AS NEEDED    rOPINIRole (REQUIP) 4 MG tablet Take 1 tablet (4 mg total) by mouth once daily. Pt taking 4mg daily    sacubitriL-valsartan (ENTRESTO)  mg per tablet Take 1 tablet by mouth 2 (two) times daily.    sacubitriL-valsartan (ENTRESTO)  mg per tablet Take 1 tablet by mouth 2 (two) times a day.    scopolamine (TRANSDERM-SCOP) 1.3-1.5 mg (1 mg over 3 days) SMARTSIG:Patch(s) T-DERMAL Every 3 Days    SPIRIVA WITH HANDIHALER 18 mcg inhalation capsule INHALE 1 CAPSULE INTO THE LUNGS ONCE DAILY.    VENTOLIN HFA 90 mcg/actuation inhaler Inhale 2 puffs into the lungs 2 (two) times daily as needed.     walker Misc 1 Device by Misc.(Non-Drug; Combo Route) route as needed.     Family History       Problem Relation (Age of Onset)    Cancer Mother    Cirrhosis Brother    Diabetes Brother    Heart attack Father    Heart disease Father, Sister, Brother, Sister, Brother    Hypertension Father, Brother          Tobacco Use    Smoking status: Never    Smokeless tobacco: Never   Substance and Sexual Activity    Alcohol use: Yes     Comment: up to 1 yr ago drank 2-3 drinks on occasion but sporadic    Drug use: No    Sexual activity: Yes     Partners: Male     Review of Systems   Constitutional:  Negative for chills and fever.   Respiratory:  Positive for shortness of breath (improving). Negative for wheezing.    Cardiovascular:  Negative for chest pain and palpitations.   Gastrointestinal:  Negative for abdominal pain, blood in stool, constipation and nausea.   Genitourinary:  Negative for difficulty urinating.   Psychiatric/Behavioral:  Positive for dysphoric mood (feels "frustrated" because cardiology "wont give me what I need"-a transplant). Negative for sleep disturbance. The patient is nervous/anxious.         Tearful /crying    Objective:     Vital Signs (Most Recent):  Temp: 98.5 °F (36.9 °C) (11/01/22 0702)  Pulse: 70 (11/01/22 " 0702)  Resp: 17 (11/01/22 0702)  BP: (!) 96/53 (11/01/22 0702)  SpO2: 95 % (11/01/22 0702)   Vital Signs (24h Range):  Temp:  [96.3 °F (35.7 °C)-99.3 °F (37.4 °C)] 98.5 °F (36.9 °C)  Pulse:  [63-82] 70  Resp:  [15-20] 17  SpO2:  [93 %-99 %] 95 %  BP: ()/(53-72) 96/53     Weight: 97.5 kg (215 lb)  Body mass index is 34.7 kg/m².    Physical Exam  Vitals and nursing note reviewed.   Constitutional:       Appearance: She is well-developed. She is obese.   HENT:      Head: Normocephalic and atraumatic.      Right Ear: External ear normal.      Left Ear: External ear normal.      Nose: Nose normal.   Eyes:      Extraocular Movements: EOM normal.      Pupils: Pupils are equal, round, and reactive to light.   Neck:      Thyroid: No thyromegaly.      Vascular: No JVD.      Trachea: No tracheal deviation.   Cardiovascular:      Rate and Rhythm: Normal rate.      Heart sounds: Normal heart sounds. No murmur heard.  Pulmonary:      Effort: Pulmonary effort is normal. No respiratory distress.      Breath sounds: No wheezing or rales.   Abdominal:      General: Bowel sounds are normal. There is no distension.      Palpations: Abdomen is soft.      Tenderness: There is no abdominal tenderness.   Musculoskeletal:         General: Normal range of motion.      Cervical back: Normal range of motion and neck supple.      Right lower leg: No edema.      Left lower leg: No edema.   Lymphadenopathy:      Cervical: No cervical adenopathy.   Skin:     General: Skin is warm and dry.   Neurological:      Mental Status: She is alert and oriented to person, place, and time.      Cranial Nerves: No cranial nerve deficit.      Motor: No abnormal muscle tone.      Deep Tendon Reflexes: Reflexes are normal and symmetric.   Psychiatric:         Mood and Affect: Mood is anxious and depressed.         Thought Content: Thought content normal.      Comments: Crying ; will resume cymbalta         CRANIAL NERVES     CN III, IV, VI   Pupils are  equal, round, and reactive to light.  Extraocular motions are normal.      Significant Labs:       10/30 mag 1.5  BMP  Lab Results   Component Value Date     11/01/2022    K 4.1 11/01/2022     11/01/2022    CO2 26 11/01/2022    BUN 61 (H) 11/01/2022    CREATININE 2.2 (H) 11/01/2022    CALCIUM 8.9 11/01/2022    ANIONGAP 12 11/01/2022    ESTGFRAFRICA 29.7 (A) 04/26/2022    EGFRNONAA 25.7 (A) 04/26/2022       Recent Labs   Lab 10/28/22  1426 10/29/22  0916 10/31/22  0544   CPK 45  45  --   --    CPKMB 3.6  --   --    TROPONINI 1.061*   < > 0.846*   MB 8.0*  --   --     < > = values in this interval not displayed.       Lab Results   Component Value Date    WBC 4.98 10/31/2022    HGB 9.5 (L) 10/31/2022    HCT 32.8 (L) 10/31/2022    MCV 60 (L) 10/31/2022     10/31/2022       BNP  Recent Labs   Lab 10/28/22  1426   BNP 2,771*       10/28 covid negative   Flu negative       Significant Imaging:     Cxr Opacification of left lower chest suggests atelectasis versus consolidated pneumonia with possible effusion    EKG Sinus rhythm with Premature atrial complexes   Possible Left atrial enlargement   Left axis deviation   LVH with QRS widening and repolarization abnormality   Prolonged QT   Abnormal ECG   When compared with ECG of 28-OCT-2022 14:37,   Premature ventricular complexes are no longer Present   Premature atrial complexes are now Present   Confirmed by Roney ROQUE MD ([id number]) on 10/29/2022 10:18:57 AM

## 2022-11-01 NOTE — ASSESSMENT & PLAN NOTE
Patient with Permanent atrial fibrillation which is controlled currently with Beta Blocker. Patient is currently in sinus rhythm.[id number] Score: 3. HASBLED Score: . Anticoagulation indicated. Anticoagulation done with eliquis..

## 2022-11-01 NOTE — PROGRESS NOTES
[facility] ([facility])  [facility] Medicine  Progress Note    Patient Name: Hafsa Hawley  MRN: [id number]  Patient Class: IP- Inpatient   Admission Date: 10/28/2022  Length of Stay: 3 days  Attending Physician: Colt Evans MD  Primary Care Provider: Cristobal Ann MD        Subjective:     Principal Problem:NICM (nonischemic cardiomyopathy)        HPI:  58 y/o female with h/o combined systolic and diastolic CHF(EF 25% 3/2021), re-presents to the ED 24 hours later with persistent SOB not relieved with IV lasix in ED yesterday and trial of outpt diuresis at home. Her BNP is 2700, 2200 yesterday. Creatinine 1.8--- her baseline. TPN 1.061. 1.061 yesterday. Her EKG has prolonged QT and PVC's. No ST changes consistent with an infarct pattern. She says she has not taken her metolazone in about 2 weeks. She thinks this may be why she tipped over the edge. Admitted for supervised IV diuresis on telemetry.       Overview/Hospital Course:  Put out 2 liters since admit   BP is up this am. 170s/100s   Creatinine 1.9, 1.8 at admit   Reports some mild improvement in her SOB   97% on 3 liters       10/30  Diureses has slowed down a bit. Put out 900ml last 24 hours and is negative 300 for the shift.  BP much better after adding Bidil.  Ranging /54-58  She is 92-94% on 1.5 LPM   BMP pending at time of note.       10/31 pt was adfmitted for IV diureses with combined systolic/diastolic heart failure EF 25%. She was started on lasix 40mg IV in ER 10/28 and outpt -1925. Received 40mg IV lasix BID 10/29 and outpt was -300 and again yesterday had one dose IV lasix 40mg and outpt was -500-- total output --2725. She is feeling better though creat has increased from 1.8>2.5>2.3 today- further diuretics on hold. She does c./o chest pressure which she reports is her norm. On 2L NC sats 93% on 2L NC (has O2 as needed at home). Troponin had been trending down- can repeat today >> still trending down    11/1/22  Hafsa Hawley  is a 57 y.o. female admitted with acute on chronic Combined systolic/diastolic heart failure EF 25%  Negative 3,650 since admission. Creat 2.5>2.3>2.2 ; last dose of furosemide on 10/30  O2 sat 95-98% on 2LNC; uses home O2 prn ,    Tmax 99.3 , BP 96/53- 125/62- bidil stopped; getting entresto, metoprolol Xl 100mg daily , MG+ 1.5 ; supplement   TNI Max 1.061; trended down to 0.846 ; cardiology documenting demand ischemia in setting of Heart failure.    Family brought CPAP but patient not using much; advised use of CPAP ; pt understands and agrees to increase use.   Pt notes she had previous w/u with heart transplant team and told she is not candidate for heart transplant due to CKD. She is very discouraged and tearful on exam today. Will restart cymbalta 30mg daily. Creat cl 43. .        Past Medical History:   Diagnosis Date    Anemia     Anticoagulant long-term use     Arthritis     Atrial fibrillation     CKD (chronic kidney disease), stage IV 5/8/2018    Congestive heart failure     COPD (chronic obstructive pulmonary disease)     Deep vein thrombosis     elevated bilirubin d/t Gilbert's syndrome     confirmed by [place] genetic testing, evaluated by hepatology    Encounter for blood transfusion     GERD (gastroesophageal reflux disease)     Hypertension     Hypertensive cardiovascular-renal disease, stage 1-4 or unspecified chronic kidney disease, with heart failure 3/4/2022    Pheochromocytoma, malignant     Restrictive lung disease 4/26/2022    Right kidney mass     Sleep apnea     Thalassemia trait, alpha     Thyroid disease     Type 2 diabetes mellitus with hyperglycemia, without long-term current use of insulin 8/13/2020       Past Surgical History:   Procedure Laterality Date    APPENDECTOMY      BONE MARROW BIOPSY      CARDIAC DEFIBRILLATOR PLACEMENT Left 12/2016    CARDIAC ELECTROPHYSIOLOGY MAPPING AND ABLATION      CARDIAC ELECTROPHYSIOLOGY MAPPING AND ABLATION      COLONOSCOPY  "N/A 5/6/2022    Procedure: COLONOSCOPY;  Surgeon: Arely Betancourt MD;  Location: [facility] ENDO (2ND FLR);  Service: Endoscopy;  Laterality: N/A;  heart transplant candidate/ EF 25% - 2nd floor/ defib - [facility] - ERW  Eliquis - per Dr. Cortez with [facility] [place], Pt ok to hold Eliquis x 2 days prior-see media tab-outside correspondence dated 12/30/21  - [facility]  verbal instructions/portal instructions/email instructions - s    EYE SURGERY      due to running tears    FRACTURE SURGERY Left     hand 5th digit    HYSTERECTOMY      KNEE SURGERY Left 2016    hematoma    LIVER BIOPSY  10/24/2018    Minimal steatosis, predominantly macrovesicular, 1%, Minimal nonspecific portal inflammation, no fibrosis. No findings on biopsy to explain elevated bilirubin levels. Could be d/t Gilbert's =?- hemolysis    RIGHT HEART CATHETERIZATION Right 12/07/2021    Procedure: INSERTION, CATHETER, RIGHT HEART;  Surgeon: Irving Cardenas MD;  Location: [facility] CATH LAB;  Service: Cardiology;  Laterality: Right;    TRANSJUGULAR BIOPSY OF LIVER N/A 10/24/2018    Procedure: BIOPSY, LIVER, TRANSJUGULAR APPROACH;  Surgeon: [facility] Diagnostic Provider;  [facility];  Service: Radiology;  Laterality: N/A;       Review of patient's allergies indicates:   Allergen Reactions    Penicillins Hives    Iodinated contrast media Nausea And Vomiting    Oxycodone-acetaminophen Other (See Comments)     Nausea, Dizziness, Anxiety.  "I don't like how it makes me feel."   Given Hydromorphone 0.5mg IVP  Without problems.  Other reaction(s): Other (See Comments)    Diovan hct [valsartan-hydrochlorothiazide] Other (See Comments)     Causes coughing    Irinotecan      Pt has homozygosity for the TA7 promoter variant that places this individual at significantly increased risk for   severe neutropenia (grade 4) when treated with the standard dose of irinotecan (risk approximately 50%).   Other drugs that have been demonstrated to be impacted by homozygosity " for the UGT1A1 TA7 promoter variant include pazopanib, nilotinib, atazanavir, and belinostat. Metabolism of other drugs not listed here may also be impacted by UGT1A1 enzyme activity.       Tramadol Nausea And Vomiting     Other reaction(s): Other (See Comments)       Current Facility-Administered Medications on File Prior to Encounter   Medication    0.9%  NaCl infusion    vancomycin in dextrose 5 % 1 gram/250 mL IVPB 1,000 mg     Current Outpatient Medications on File Prior to Encounter   Medication Sig    albuterol (ACCUNEB) 0.63 mg/3 mL Nebu Take 3 mLs (0.63 mg total) by nebulization every 6 (six) hours as needed. Rescue    albuterol-ipratropium (DUO-NEB) 2.5 mg-0.5 mg/3 mL nebulizer solution Take 3 mLs by nebulization 2 (two) times a day.    allopurinoL (ZYLOPRIM) 100 MG tablet TAKE 2 TABLETS BY MOUTH ONCE DAILY    allopurinoL (ZYLOPRIM) 100 MG tablet Take 2 tablets (200 mg total) by mouth once daily.    ALPRAZolam (XANAX) 2 MG Tab Take 2 mg by mouth 2 (two) times daily as needed.    apixaban (ELIQUIS) 2.5 mg Tab Take 1 tablet (2.5 mg total) by mouth 2 (two) times a day. Decrease in dose (Patient taking differently: Take 5 mg by mouth 2 (two) times a day.)    atorvastatin (LIPITOR) 40 MG tablet Take 1 tablet (40 mg total) by mouth every evening.    atorvastatin (LIPITOR) 40 MG tablet Take 1 tablet (40 mg total) by mouth every evening.    b complex vitamins tablet Take 1 tablet by mouth once daily.    blood sugar diagnostic (ACCU-CHEK GUIDE TEST STRIPS) Strp 1 strip by Other route 3 (three) times daily.    blood sugar diagnostic (BLOOD GLUCOSE TEST) Strp 1 strip by Misc.(Non-Drug; Combo Route) route 3 (three) times daily.    blood-glucose meter kit Use as instructed    colchicine (COLCRYS) 0.6 mg tablet TAKE 1 TABLET BY MOUTH TWICE A DAY    diclofenac sodium (VOLTAREN) 1 % Gel APPLY 2 G TOPICALLY 3 (THREE) TIMES DAILY AS NEEDED (PAIN).    diltiazem HCl (DILTIAZEM 2% CREAM) Apply topically 3  (three) times daily. Apply topically to anal area.    ELIQUIS 5 mg Tab TAKE 1 TABLET BY MOUTH TWICE A DAY    empagliflozin (JARDIANCE) 25 mg tablet Jardiance 25 mg tablet    ergocalciferol (ERGOCALCIFEROL) 50,000 unit Cap Take 1 capsule (50,000 Units total) by mouth every 7 days.    ergocalciferol (ERGOCALCIFEROL) 50,000 unit Cap Take 1 capsule (50,000 Units total) by mouth every 7 days.    ferrous sulfate 325 (65 FE) MG EC tablet Take 1 tablet (325 mg total) by mouth once daily.    fluticasone propionate (FLONASE) 50 mcg/actuation nasal spray 2 sprays by Each Nostril route daily as needed for Rhinitis.     furosemide (LASIX) 80 MG tablet Take 1 tablet (80 mg total) by mouth once daily. Use afternoon dose if needed    glimepiride (AMARYL) 2 MG tablet TAKE 1 TABLET BY MOUTH EVERY DAY WITH BREAKFAST    glimepiride (AMARYL) 2 MG tablet Take 1 tablet (2 mg total) by mouth once daily with breakfast    hydrocortisone 2.5 % cream APPLY TOPICALLY 2 (TWO) TIMES DAILY AS NEEDED (HEMORRHOIDS).    lancets (ACCU-CHEK SOFTCLIX LANCETS) Misc 1 Device by Misc.(Non-Drug; Combo Route) route 3 (three) times daily.    LIDOcaine (LIDODERM) 5 % Place 1 patch onto the skin once daily. Remove & Discard patch within 12 hours or as directed by MD    LIDOcaine (LIDODERM) 5 % Place 1 patch onto the skin once daily. Remove & discard patch within 12 hours or as directed by MD    linaCLOtide (LINZESS) 145 mcg Cap capsule Take 1 capsule (145 mcg total) by mouth before breakfast. Hold if diarrhea    metOLazone (ZAROXOLYN) 5 MG tablet Take 5 mg by mouth.    metoprolol succinate (TOPROL-XL) 100 MG 24 hr tablet Take 100 mg by mouth once daily.    mometasone-formoterol (DULERA) 200-5 mcg/actuation inhaler Inhale 2 puffs into the lungs 2 (two) times daily. Controller    mometasone-formoterol (DULERA) 200-5 mcg/actuation inhaler Inhale 2 puffs into the lungs 2 times daily. controller    montelukast (SINGULAIR) 10 mg tablet Take 1  tablet every day by oral route for 30 days.    nitroGLYCERIN (NITROSTAT) 0.4 MG SL tablet Please see attached for detailed directions    NITROSTAT 0.4 mg SL tablet Take 2.5 tablets (1 mg total) by mouth every 5 (five) minutes as needed for Chest pain. No more than 3 tablets in 15 minutes.    nystatin (MYCOSTATIN) powder Apply topically 2 (two) times daily.    olopatadine (PATADAY) 0.2 % Drop Instill 1 drop into both eyes once daily    ondansetron (ZOFRAN-ODT) 8 MG TbDL Take 8 mg by mouth every 8 (eight) hours as needed.    pantoprazole (PROTONIX) 40 MG tablet TAKE 1 TABLET BY MOUTH DAILY IN THE MORNING    pantoprazole (PROTONIX) 40 MG tablet Take 1 tablet (40 mg total) by mouth every morning.    polyethylene glycol (GLYCOLAX) 17 gram PwPk Take 17 g by mouth 3 (three) times daily as needed (constipation/hard stools).    promethazine-codeine 6.25-10 mg/5 ml (PHENERGAN WITH CODEINE) 6.25-10 mg/5 mL syrup TAKE 5 mLs BY MOUTH EVERY 4 - 6 HOURS AS NEEDED    rOPINIRole (REQUIP) 4 MG tablet Take 1 tablet (4 mg total) by mouth once daily. Pt taking 4mg daily    sacubitriL-valsartan (ENTRESTO)  mg per tablet Take 1 tablet by mouth 2 (two) times daily.    sacubitriL-valsartan (ENTRESTO)  mg per tablet Take 1 tablet by mouth 2 (two) times a day.    scopolamine (TRANSDERM-SCOP) 1.3-1.5 mg (1 mg over 3 days) SMARTSIG:Patch(s) T-DERMAL Every 3 Days    SPIRIVA WITH HANDIHALER 18 mcg inhalation capsule INHALE 1 CAPSULE INTO THE LUNGS ONCE DAILY.    VENTOLIN HFA 90 mcg/actuation inhaler Inhale 2 puffs into the lungs 2 (two) times daily as needed.     walker Misc 1 Device by Misc.(Non-Drug; Combo Route) route as needed.     Family History       Problem Relation (Age of Onset)    Cancer Mother    Cirrhosis Brother    Diabetes Brother    Heart attack Father    Heart disease Father, Sister, Brother, Sister, Brother    Hypertension Father, Brother          Tobacco Use    Smoking status: Never    Smokeless  "tobacco: Never   Substance and Sexual Activity    Alcohol use: Yes     Comment: up to 1 yr ago drank 2-3 drinks on occasion but sporadic    Drug use: No    Sexual activity: Yes     Partners: Male     Review of Systems   Constitutional:  Negative for chills and fever.   Respiratory:  Positive for shortness of breath (improving). Negative for wheezing.    Cardiovascular:  Negative for chest pain and palpitations.   Gastrointestinal:  Negative for abdominal pain, blood in stool, constipation and nausea.   Genitourinary:  Negative for difficulty urinating.   Psychiatric/Behavioral:  Positive for dysphoric mood (feels "frustrated" because cardiology "wont give me what I need"-a transplant). Negative for sleep disturbance. The patient is nervous/anxious.         Tearful /crying    Objective:     Vital Signs (Most Recent):  Temp: 98.5 °F (36.9 °C) (11/01/22 0702)  Pulse: 70 (11/01/22 0702)  Resp: 17 (11/01/22 0702)  BP: (!) 96/53 (11/01/22 0702)  SpO2: 95 % (11/01/22 0702)   Vital Signs (24h Range):  Temp:  [96.3 °F (35.7 °C)-99.3 °F (37.4 °C)] 98.5 °F (36.9 °C)  Pulse:  [63-82] 70  Resp:  [15-20] 17  SpO2:  [93 %-99 %] 95 %  BP: ()/(53-72) 96/53     Weight: 97.5 kg (215 lb)  Body mass index is 34.7 kg/m².    Physical Exam  Vitals and nursing note reviewed.   Constitutional:       Appearance: She is well-developed. She is obese.   HENT:      Head: Normocephalic and atraumatic.      Right Ear: External ear normal.      Left Ear: External ear normal.      Nose: Nose normal.   Eyes:      Extraocular Movements: EOM normal.      Pupils: Pupils are equal, round, and reactive to light.   Neck:      Thyroid: No thyromegaly.      Vascular: No JVD.      Trachea: No tracheal deviation.   Cardiovascular:      Rate and Rhythm: Normal rate.      Heart sounds: Normal heart sounds. No murmur heard.  Pulmonary:      Effort: Pulmonary effort is normal. No respiratory distress.      Breath sounds: No wheezing or rales.   Abdominal: "      General: Bowel sounds are normal. There is no distension.      Palpations: Abdomen is soft.      Tenderness: There is no abdominal tenderness.   Musculoskeletal:         General: Normal range of motion.      Cervical back: Normal range of motion and neck supple.      Right lower leg: No edema.      Left lower leg: No edema.   Lymphadenopathy:      Cervical: No cervical adenopathy.   Skin:     General: Skin is warm and dry.   Neurological:      Mental Status: She is alert and oriented to person, place, and time.      Cranial Nerves: No cranial nerve deficit.      Motor: No abnormal muscle tone.      Deep Tendon Reflexes: Reflexes are normal and symmetric.   Psychiatric:         Mood and Affect: Mood is anxious and depressed.         Thought Content: Thought content normal.      Comments: Crying ; will resume cymbalta         CRANIAL NERVES     CN III, IV, VI   Pupils are equal, round, and reactive to light.  Extraocular motions are normal.      Significant Labs:       10/30 mag 1.5  BMP  Lab Results   Component Value Date     11/01/2022    K 4.1 11/01/2022     11/01/2022    CO2 26 11/01/2022    BUN 61 (H) 11/01/2022    CREATININE 2.2 (H) 11/01/2022    CALCIUM 8.9 11/01/2022    ANIONGAP 12 11/01/2022    ESTGFRAFRICA 29.7 (A) 04/26/2022    EGFRNONAA 25.7 (A) 04/26/2022       Recent Labs   Lab 10/28/22  1426 10/29/22  0916 10/31/22  0544   CPK 45  45  --   --    CPKMB 3.6  --   --    TROPONINI 1.061*   < > 0.846*   MB 8.0*  --   --     < > = values in this interval not displayed.       Lab Results   Component Value Date    WBC 4.98 10/31/2022    HGB 9.5 (L) 10/31/2022    HCT 32.8 (L) 10/31/2022    MCV 60 (L) 10/31/2022     10/31/2022       BNP  Recent Labs   Lab 10/28/22  1426   BNP 2,771*       10/28 covid negative   Flu negative       Significant Imaging:     Cxr Opacification of left lower chest suggests atelectasis versus consolidated pneumonia with possible effusion    EKG Sinus rhythm with  Premature atrial complexes   Possible Left atrial enlargement   Left axis deviation   LVH with QRS widening and repolarization abnormality   Prolonged QT   Abnormal ECG   When compared with ECG of 28-OCT-2022 14:37,   Premature ventricular complexes are no longer Present   Premature atrial complexes are now Present   Confirmed by Roney ROQUE MD ([id number]) on 10/29/2022 10:18:57 AM      Assessment/Plan:      * NICM (nonischemic cardiomyopathy)  Last EF 30% per [facility] records  She sees Dr. Cortez   toprolol  entresto  Lasix 80 daily at home   Will start with 40mg IV BID here   Continue home meds   Dobutamine? [facility] to see Monday     10/31: [facility] following. Holding lasix. Monitor renal fxn. Cont other meds same dose for now per [facility] recommendations  11/1  Holding lasix   Follow cardiology recommendations    Hypertensive cardiovascular-renal disease, stage 1-4 or unspecified chronic kidney disease, with heart failure  Currently at her baseline creatinine, 1.8  Will monitor closely while diuresing   BMP  Lab Results   Component Value Date     11/01/2022    K 4.1 11/01/2022     11/01/2022    CO2 26 11/01/2022    BUN 61 (H) 11/01/2022    CREATININE 2.2 (H) 11/01/2022    CALCIUM 8.9 11/01/2022    ANIONGAP 12 11/01/2022    ESTGFRAFRICA 29.7 (A) 04/26/2022    EGFRNONAA 25.7 (A) 04/26/2022       10/31:  She is a little dry lasix held today and is better creat 2.5>2.3    Lasix held this am as her creat has bunmped. She ahs diuresed 2775 since admission. Bp this am 110/72, cont entresto and BB  Will not give back IVF just hold diuretic and let her naturally improve on her own. OK for PO fluids    Type 2 diabetes mellitus without complication, without long-term current use of insulin  Patient's FSGs are controlled on current medication regimen  Last A1c reviewed-   Lab Results   Component Value Date    HGBA1C 6.5 (H) 08/23/2022     Most recent fingerstick glucose reviewed-   Recent Labs   Lab 10/31/22  0722 10/31/22  1132  10/31/22  1623 10/31/22  1929   POCTGLUCOSE 153* 147* 181* 225*     Current correctional scale  Medium  Increase anti-hyperglycemic dose as follows-   Antihyperglycemics (From admission, onward)    Start     Stop Route Frequency Ordered    10/29/22 0715  insulin aspart U-100 pen 7 Units         -- SubQ 3 times daily with meals 10/28/22 1915    10/28/22 1914  insulin aspart U-100 pen 0-5 Units         -- SubQ Before meals & nightly PRN 10/28/22 1915        Hold Oral hypoglycemics while patient is in the hospital. this am  11/1 >149     Depression due to physical illness  Patient has persistent depression which is moderate and is currently uncontrolled. Will Begin anti-depressant medications. We will not consult psychiatry at this time. Patient does not display psychosis at this time. Continue to monitor closely and adjust plan of care as needed.  Pt tearful and upset over her condition. Reports she was deemed not a candidate for heart transplant in [place]. She does not have means to go to [place]  Used to take Cymbalta, will resume cymbalta 30mg daily   Counseled .      Chronic combined systolic and diastolic heart failure  Last EF 25% 3/2021. [facility] may have more recent one   She sees Dr. Cortez   toprolol  entresto  Lasix 80 daily at home   Will start with 40mg IV BID here   Continue home meds   Add Bidil 10/29  D/c Bidil 10/30 as she has become mildly hypotensive after diuresis commenced.      10/31 Lasix held this am as her creat has bumped. She ahs diuresed 2775 since admission. Bp this am 110/72, cont entresto and BB  Will not give back IVF just hold diuretic and let her naturally improve on her own. OK for PO fluids  Has home oxygen but was not using constantly, wean today as toelrated      11/1  Holding lasix    Paroxysmal atrial fibrillation  Patient with Permanent atrial fibrillation which is controlled currently with Beta Blocker. Patient is currently in sinus rhythm.[id number] Score: 3. HASBLED Score: .  Anticoagulation indicated. Anticoagulation done with eliquis..        MELISSA (obstructive sleep apnea)  She will call  to bring in her home cpap. Sounds like she has an auto cpap unit. Has not set it up at home yet. We can have respiratory set up here. Used it last night- uncomfortable but will cont to try .        Essential hypertension  entresto  Metoprolol  Add bidil 10/29  D/C bidil 10/30 as her BP this morning is 88/50  Hold BB, entresto for systolic less than 90   10/31 bp 110/72  11/1 SBP 90-120s; entresto and metoprolol ordered .    Microcytic anemia  Lab Results   Component Value Date    IRON 85 02/15/2022    TRANSFERRIN 209 02/15/2022    TIBC 309 02/15/2022    FESATURATED 28 02/15/2022      Continue home iron.      Elevated troponin  Due to her dilated cardiomyopathy and persistently low EF. Chronically leaks TPN. It is trending down...   Recent Labs   Lab 10/31/22  0544   TROPONINI 0.846*     Repeat today still trending down .          VTE Risk Mitigation (From admission, onward)         Ordered     apixaban tablet 2.5 mg  2 times daily         10/28/22 1943     IP VTE HIGH RISK PATIENT  Once         10/28/22 1915     Place sequential compression device  Until discontinued         10/28/22 1915                Discharge Planning   CHIQUI:      Code Status: Full Code   Is the patient medically ready for discharge?:     Reason for patient still in hospital (select all that apply): Treatment  Discharge Plan A: Home                  Lisa Rodarte MD  Department of Hospital Medicine   [place] - Med Surg (3rd Fl)

## 2022-11-01 NOTE — ASSESSMENT & PLAN NOTE
entresto  Metoprolol  Add bidil 10/29  D/C bidil 10/30 as her BP this morning is 88/50  Hold BB, entresto for systolic less than 90   10/31 bp 110/72  11/1 SBP 90-120s; entresto and metoprolol ordered .

## 2022-11-01 NOTE — ASSESSMENT & PLAN NOTE
Due to her dilated cardiomyopathy and persistently low EF. Chronically leaks TPN. It is trending down...   Recent Labs   Lab 10/31/22  0544   TROPONINI 0.846*     Repeat today still trending down .

## 2022-11-01 NOTE — ASSESSMENT & PLAN NOTE
Patient has persistent depression which is moderate and is currently uncontrolled. Will Begin anti-depressant medications. We will not consult psychiatry at this time. Patient does not display psychosis at this time. Continue to monitor closely and adjust plan of care as needed.  Pt tearful and upset over her condition. Reports she was deemed not a candidate for heart transplant in [place]. She does not have means to go to [place]  Used to take Cymbalta, will resume cymbalta 30mg daily   Counseled .

## 2022-11-01 NOTE — ASSESSMENT & PLAN NOTE
Last EF 25% 3/2021. [facility] may have more recent one   She sees Dr. Cortez   toprolol  entresto  Lasix 80 daily at home   Will start with 40mg IV BID here   Continue home meds   Add Bidil 10/29  D/c Bidil 10/30 as she has become mildly hypotensive after diuresis commenced.      10/31 Lasix held this am as her creat has bumped. She ahs diuresed 2775 since admission. Bp this am 110/72, cont entresto and BB  Will not give back IVF just hold diuretic and let her naturally improve on her own. OK for PO fluids  Has home oxygen but was not using constantly, wean today as toelrated      11/1  Holding lasix

## 2022-11-01 NOTE — PROGRESS NOTES
[place] [facility] ([facility])  Cardiology  Progress Note    Patient Name: Hafsa Hawley  MRN: [id number]  Admission Date: 10/28/2022  Hospital Length of Stay: 3 days  Code Status: Full Code   Attending Physician: Colt Evans MD   Primary Care Physician: Cristobal Ann MD  Expected Discharge Date:   Principal Problem:NICM (nonischemic cardiomyopathy)    Subjective:     Hospital Course: 56 y/o female with h/o combined systolic and diastolic CHF(EF 25% 3/2021), re-presents to the ED 24 hours later with persistent SOB not relieved with IV lasix in ED yesterday and trial of outpt diuresis at home. Her BNP is 2700, 2200 yesterday. Creatinine 1.8--- her baseline. TPN 1.061. 1.061 yesterday. Her EKG has prolonged QT and PVC's. No ST changes consistent with an infarct pattern. She says she has not taken her metolazone in about 2 weeks. She thinks this may be why she tipped over the edge. Admitted for supervised IV diuresis on telemetry    Hospital Course:  10/29: IV diuresis. Neg 2L. CHF medications optimized.   10/30: [facility] consulted for dobutamine recommendations/considerations in setting of hypotension. Bidil on hold   10/31: Diuretics are on hold due to worsening renal function and labile BP  11/1: 12 beat run of VT overnight. Mag level pending. Pt does have AICD in place. Consider interrogation.  Labile BP. Renal function stable. -3.6L since admit. Consider adjusting ARNI/BB  Review of Systems   Cardiovascular:  Positive for chest pain.   Respiratory:  Positive for shortness of breath.    All other systems reviewed and are negative.  Objective:     Vital Signs (Most Recent):  Temp: 98.5 °F (36.9 °C) (11/01/22 0702)  Pulse: 75 (11/01/22 0800)  Resp: 16 (11/01/22 0800)  BP: (!) 96/53 (11/01/22 0702)  SpO2: 98 % (11/01/22 0800)   Vital Signs (24h Range):  Temp:  [96.3 °F (35.7 °C)-99.3 °F (37.4 °C)] 98.5 °F (36.9 °C)  Pulse:  [63-82] 75  Resp:  [15-20] 16  SpO2:  [93 %-99 %] 98 %  BP: ()/(53-72) 96/53      Weight: 97.5 kg (215 lb)  Body mass index is 34.7 kg/m².    SpO2: 98 %  O2 Device (Oxygen Therapy): nasal cannula      Intake/Output Summary (Last 24 hours) at 11/1/2022 0805  Last data filed at 11/1/2022 0508  Gross per 24 hour   Intake --   Output 925 ml   Net -925 ml       Lines/Drains/Airways       Peripheral Intravenous Line  Duration                  Peripheral IV - Single Lumen 10/28/22 1438 18 G Right Antecubital 3 days                    Physical Exam  Vitals reviewed.   Constitutional:       Appearance: She is obese.   HENT:      Head: Normocephalic and atraumatic.      Mouth/Throat:      Mouth: Mucous membranes are moist.   Cardiovascular:      Rate and Rhythm: Normal rate and regular rhythm.   Pulmonary:      Effort: Pulmonary effort is normal.      Breath sounds: Normal breath sounds.   Musculoskeletal:         General: Normal range of motion.      Right lower leg: No edema.      Left lower leg: No edema.   Skin:     General: Skin is warm and dry.   Neurological:      Mental Status: She is alert.   Psychiatric:      Comments: Anxious and tangential        Significant Labs:   Recent Lab Results  (Last 5 results in the past 24 hours)        11/01/22  0732   11/01/22  0605   10/31/22  1929   10/31/22  1623   10/31/22  1132        Anion Gap   12             BUN   61             Calcium   8.9             Chloride   101             CO2   26             Creatinine   2.2             eGFR   26             Glucose   149             POCT Glucose 139     225   181   147       Potassium   4.1             Sodium   139                                  FINDINGS:  Similar opacification of left lower chest with obscuration of the diaphragm.     Right lung and upper left lung are clear.  Enlarged cardiac silhouette, left ICD.         Impression     Opacification of left lower chest suggests atelectasis versus consolidated pneumonia with possible effusion        Electronically signed by:       Sophia Green MD  Date:                                         10/28/2022  Time:                                       15:23   Echo  4/2022:  The study quality is below average.   Images are off axis.   The left ventricle is moderately enlarged. Global left ventricular systolic function is moderately decreased. The left ventricular ejection fraction is 25-30%. Noted moderate concentric left ventricular hypertrophy. Left ventricular diastolic function was not assessed due to limited study being performed.  The right ventricle is mildly enlarged.   Mild calcification of the mitral valve is noted.   Mild calcification of the aortic valve is noted with adequate cuspal excursion.   Trace tricuspid regurgitation.   The pulmonary artery systolic pressure is 25 mmHg.   A linear echo density is noted in the right ventricle, suggestive of a ICD lead.The study quality is below average.   Images are off axis.   The left ventricle is moderately enlarged. Global left ventricular systolic function is moderately decreased. The left ventricular ejection fraction is 25-30%. Noted moderate concentric left ventricular hypertrophy. Left ventricular diastolic function was not assessed due to limited study being performed.  The right ventricle is mildly enlarged.   Mild calcification of the mitral valve is noted.   Mild calcification of the aortic valve is noted with adequate cuspal excursion.   Trace tricuspid regurgitation.   The pulmonary artery systolic pressure is 25 mmHg.   A linear echo density is noted in the right ventricle, suggestive of a ICD lead.    Assessment and Plan:           Active Diagnoses:    Diagnosis Date Noted POA    PRINCIPAL PROBLEM:  NICM (nonischemic cardiomyopathy) [I42.8] 10/27/2016 Yes     Chronic    Hypertensive cardiovascular-renal disease, stage 1-4 or unspecified chronic kidney disease, with heart failure [I13.0] 03/04/2022 Yes    Type 2 diabetes mellitus without complication, without long-term current use of insulin [E11.9]  08/13/2020 Yes    Chronic combined systolic and diastolic heart failure [I50.42] 03/16/2018 Yes    Paroxysmal atrial fibrillation [I48.0] 08/25/2016 Yes     Chronic    MELISSA (obstructive sleep apnea) [G47.33] 08/23/2016 Yes     Chronic    Essential hypertension [I10] 07/22/2016 Yes     Chronic    Elevated troponin [R77.8] 07/20/2016 Yes    Microcytic anemia [D50.9] 07/20/2016 Yes     Chronic      Problems Resolved During this Admission:    Diagnosis Date Noted Date Resolved POA    Fibromyalgia affecting multiple sites [M79.7] 06/15/2016 10/30/2022 Yes     Chronic       VTE Risk Mitigation (From admission, onward)           Ordered     apixaban tablet 2.5 mg  2 times daily         10/28/22 1943     IP VTE HIGH RISK PATIENT  Once         10/28/22 1915     Place sequential compression device  Until discontinued         10/28/22 1915                Dx/Plan:  Acute on chronic diastolic HF; appears euvolemic this AM  NICMO; note non sustained VT on tele, has AICD  Cardiorenal Syndrome  PAF:  MELISSA  HTN  Elevated TNI     Continue apixaban, atorvastatin, toprol,   Reduce entresto   Consider decreasing ARNI in setting of labile BP  Hold iv diuresis for now. Consider restarting home po furosemide 80 mg qd when renal function stable  Consider nephrology consult  Encourage CPAP compliance    Debo Bernal NP-scribed for Dr. Sears  Cardiology  [place] - Med Surg (3rd Fl)  I attest that I have personally seen and examined this patient. I have reviewed and discussed the management in detail as outlined above.

## 2022-11-01 NOTE — PLAN OF CARE
Problem: Adult Inpatient Plan of Care  Goal: Plan of Care Review  11/1/2022 1824 by Danii Dominguez RN  Outcome: Ongoing, Progressing  11/1/2022 1637 by Danii Dominguez RN  Outcome: Ongoing, Progressing  Goal: Patient-Specific Goal (Individualized)  11/1/2022 1824 by Danii Dominguez RN  Outcome: Ongoing, Progressing  11/1/2022 1637 by Danii Dominguez RN  Outcome: Ongoing, Progressing  Goal: Absence of Hospital-Acquired Illness or Injury  11/1/2022 1824 by Danii Dominguez RN  Outcome: Ongoing, Progressing  11/1/2022 1637 by Danii Dominguez RN  Outcome: Ongoing, Progressing  Goal: Optimal Comfort and Wellbeing  11/1/2022 1824 by Danii Dominguez RN  Outcome: Ongoing, Progressing  11/1/2022 1637 by Danii Dominguez RN  Outcome: Ongoing, Progressing  Goal: Readiness for Transition of Care  11/1/2022 1824 by Danii Dominguez RN  Outcome: Ongoing, Progressing  11/1/2022 1637 by Danii Dominguez RN  Outcome: Ongoing, Progressing

## 2022-11-01 NOTE — ASSESSMENT & PLAN NOTE
Currently at her baseline creatinine, 1.8  Will monitor closely while diuresing   BMP  Lab Results   Component Value Date     11/01/2022    K 4.1 11/01/2022     11/01/2022    CO2 26 11/01/2022    BUN 61 (H) 11/01/2022    CREATININE 2.2 (H) 11/01/2022    CALCIUM 8.9 11/01/2022    ANIONGAP 12 11/01/2022    ESTGFRAFRICA 29.7 (A) 04/26/2022    EGFRNONAA 25.7 (A) 04/26/2022       10/31:  She is a little dry lasix held today and is better creat 2.5>2.3    Lasix held this am as her creat has bunmped. She ahs diuresed 2775 since admission. Bp this am 110/72, cont entresto and BB  Will not give back IVF just hold diuretic and let her naturally improve on her own. OK for PO fluids

## 2022-11-01 NOTE — ASSESSMENT & PLAN NOTE
Last EF 30% per [facility] records  She sees Dr. Dana otero  entresto  Lasix 80 daily at home   Will start with 40mg IV BID here   Continue home meds   Dobutamine? [facility] to see Monday     10/31: [facility] following. Holding lasix. Monitor renal fxn. Cont other meds same dose for now per [facility] recommendations  11/1  Holding lasix   Follow cardiology recommendations

## 2022-11-01 NOTE — PLAN OF CARE
"Pt alert and oriented X4. Pt remains on 2l nasal canula (uses home O2 as needed). Pt independently ambulatory. Pt received 2g IV magnesium. Added amiodarone and taper per protocol in view of NSVT by [facility].     Pt had a 17 beat run of vtach 1724. Patient says she just felt her heart "beating fast" and "that it happens often" Notified MD. Stat Mg ordered. Level 2.4. Called [facility] awaiting call back from on call provider.   Ac and hs.     Pt normal sinus on tele right now.       "

## 2022-11-02 PROBLEM — I47.29 NSVT (NONSUSTAINED VENTRICULAR TACHYCARDIA): Status: ACTIVE | Noted: 2022-11-02

## 2022-11-02 LAB
ANION GAP SERPL CALC-SCNC: 11 MMOL/L (ref 8–16)
ANISOCYTOSIS BLD QL SMEAR: ABNORMAL
BASOPHILS # BLD AUTO: 0.02 K/UL (ref 0–0.2)
BASOPHILS NFR BLD: 0.5 % (ref 0–1.9)
BNP SERPL-MCNC: 264 PG/ML (ref 0–99)
BUN SERPL-MCNC: 67 MG/DL (ref 6–20)
CALCIUM SERPL-MCNC: 9.1 MG/DL (ref 8.7–10.5)
CHLORIDE SERPL-SCNC: 104 MMOL/L (ref 95–110)
CO2 SERPL-SCNC: 25 MMOL/L (ref 23–29)
CREAT SERPL-MCNC: 2.1 MG/DL (ref 0.5–1.4)
DIFFERENTIAL METHOD: ABNORMAL
EOSINOPHIL # BLD AUTO: 0.2 K/UL (ref 0–0.5)
EOSINOPHIL NFR BLD: 3.9 % (ref 0–8)
ERYTHROCYTE [DISTWIDTH] IN BLOOD BY AUTOMATED COUNT: 26.7 % (ref 11.5–14.5)
EST. GFR  (NO RACE VARIABLE): 27 ML/MIN/1.73 M^2
GLUCOSE SERPL-MCNC: 128 MG/DL (ref 70–110)
HCT VFR BLD AUTO: 32.5 % (ref 37–48.5)
HGB BLD-MCNC: 9.2 G/DL (ref 12–16)
HYPOCHROMIA BLD QL SMEAR: ABNORMAL
IMM GRANULOCYTES # BLD AUTO: 0.02 K/UL (ref 0–0.04)
IMM GRANULOCYTES NFR BLD AUTO: 0.5 % (ref 0–0.5)
LYMPHOCYTES # BLD AUTO: 0.9 K/UL (ref 1–4.8)
LYMPHOCYTES NFR BLD: 23.2 % (ref 18–48)
MAGNESIUM SERPL-MCNC: 2.3 MG/DL (ref 1.6–2.6)
MCH RBC QN AUTO: 16.9 PG (ref 27–31)
MCHC RBC AUTO-ENTMCNC: 28.3 G/DL (ref 32–36)
MCV RBC AUTO: 60 FL (ref 82–98)
MONOCYTES # BLD AUTO: 0.5 K/UL (ref 0.3–1)
MONOCYTES NFR BLD: 11.1 % (ref 4–15)
NEUTROPHILS # BLD AUTO: 2.5 K/UL (ref 1.8–7.7)
NEUTROPHILS NFR BLD: 60.8 % (ref 38–73)
NRBC BLD-RTO: 0 /100 WBC
PLATELET # BLD AUTO: 173 K/UL (ref 150–450)
PLATELET BLD QL SMEAR: ABNORMAL
PMV BLD AUTO: ABNORMAL FL (ref 9.2–12.9)
POCT GLUCOSE: 110 MG/DL (ref 70–110)
POCT GLUCOSE: 130 MG/DL (ref 70–110)
POCT GLUCOSE: 170 MG/DL (ref 70–110)
POIKILOCYTOSIS BLD QL SMEAR: ABNORMAL
POTASSIUM SERPL-SCNC: 4.2 MMOL/L (ref 3.5–5.1)
RBC # BLD AUTO: 5.44 M/UL (ref 4–5.4)
SCHISTOCYTES BLD QL SMEAR: PRESENT
SODIUM SERPL-SCNC: 140 MMOL/L (ref 136–145)
TARGETS BLD QL SMEAR: ABNORMAL
WBC # BLD AUTO: 4.06 K/UL (ref 3.9–12.7)

## 2022-11-02 PROCEDURE — 25000003 PHARM REV CODE 250: Performed by: NURSE PRACTITIONER

## 2022-11-02 PROCEDURE — 25000003 PHARM REV CODE 250: Performed by: FAMILY MEDICINE

## 2022-11-02 PROCEDURE — 27000221 HC OXYGEN, UP TO 24 HOURS

## 2022-11-02 PROCEDURE — 83880 ASSAY OF NATRIURETIC PEPTIDE: CPT | Performed by: NURSE PRACTITIONER

## 2022-11-02 PROCEDURE — 80048 BASIC METABOLIC PNL TOTAL CA: CPT | Performed by: NURSE PRACTITIONER

## 2022-11-02 PROCEDURE — 11000001 HC ACUTE MED/SURG PRIVATE ROOM

## 2022-11-02 PROCEDURE — 99232 PR SUBSEQUENT HOSPITAL CARE,LEVL II: ICD-10-PCS | Mod: ,,, | Performed by: FAMILY MEDICINE

## 2022-11-02 PROCEDURE — 99232 SBSQ HOSP IP/OBS MODERATE 35: CPT | Mod: ,,, | Performed by: FAMILY MEDICINE

## 2022-11-02 PROCEDURE — 94761 N-INVAS EAR/PLS OXIMETRY MLT: CPT

## 2022-11-02 PROCEDURE — 85025 COMPLETE CBC W/AUTO DIFF WBC: CPT | Performed by: FAMILY MEDICINE

## 2022-11-02 PROCEDURE — 94640 AIRWAY INHALATION TREATMENT: CPT

## 2022-11-02 PROCEDURE — 99900035 HC TECH TIME PER 15 MIN (STAT)

## 2022-11-02 PROCEDURE — 36415 COLL VENOUS BLD VENIPUNCTURE: CPT | Performed by: NURSE PRACTITIONER

## 2022-11-02 PROCEDURE — 83735 ASSAY OF MAGNESIUM: CPT | Performed by: NURSE PRACTITIONER

## 2022-11-02 RX ADMIN — MUPIROCIN: 20 OINTMENT TOPICAL at 09:11

## 2022-11-02 RX ADMIN — PANTOPRAZOLE SODIUM 40 MG: 40 TABLET, DELAYED RELEASE ORAL at 09:11

## 2022-11-02 RX ADMIN — APIXABAN 2.5 MG: 2.5 TABLET, FILM COATED ORAL at 08:11

## 2022-11-02 RX ADMIN — AMIODARONE HYDROCHLORIDE 400 MG: 200 TABLET ORAL at 09:11

## 2022-11-02 RX ADMIN — APIXABAN 2.5 MG: 2.5 TABLET, FILM COATED ORAL at 09:11

## 2022-11-02 RX ADMIN — SACUBITRIL AND VALSARTAN 1 TABLET: 49; 51 TABLET, FILM COATED ORAL at 08:11

## 2022-11-02 RX ADMIN — DULOXETINE 30 MG: 30 CAPSULE, DELAYED RELEASE ORAL at 09:11

## 2022-11-02 RX ADMIN — AMIODARONE HYDROCHLORIDE 400 MG: 200 TABLET ORAL at 08:11

## 2022-11-02 RX ADMIN — SACUBITRIL AND VALSARTAN 1 TABLET: 49; 51 TABLET, FILM COATED ORAL at 09:11

## 2022-11-02 RX ADMIN — INSULIN ASPART 7 UNITS: 100 INJECTION, SOLUTION INTRAVENOUS; SUBCUTANEOUS at 09:11

## 2022-11-02 RX ADMIN — FERROUS SULFATE TAB 325 MG (65 MG ELEMENTAL FE) 1 EACH: 325 (65 FE) TAB at 09:11

## 2022-11-02 RX ADMIN — MUPIROCIN: 20 OINTMENT TOPICAL at 08:11

## 2022-11-02 RX ADMIN — TIOTROPIUM BROMIDE INHALATION SPRAY 2 PUFF: 3.12 SPRAY, METERED RESPIRATORY (INHALATION) at 07:11

## 2022-11-02 RX ADMIN — ATORVASTATIN CALCIUM 40 MG: 20 TABLET, FILM COATED ORAL at 08:11

## 2022-11-02 RX ADMIN — METOPROLOL SUCCINATE 50 MG: 50 TABLET, EXTENDED RELEASE ORAL at 09:11

## 2022-11-02 RX ADMIN — INSULIN ASPART 7 UNITS: 100 INJECTION, SOLUTION INTRAVENOUS; SUBCUTANEOUS at 05:11

## 2022-11-02 RX ADMIN — INSULIN ASPART 7 UNITS: 100 INJECTION, SOLUTION INTRAVENOUS; SUBCUTANEOUS at 12:11

## 2022-11-02 NOTE — ASSESSMENT & PLAN NOTE
Patient with Permanent atrial fibrillation which is controlled currently with Beta Blocker. Patient is currently in sinus rhythm.[id number] Score: 3. HASBLED Score: . Anticoagulation indicated. Anticoagulation done with eliquis..

## 2022-11-02 NOTE — ASSESSMENT & PLAN NOTE
Due to her dilated cardiomyopathy and persistently low EF. Chronically leaks TPN. It is trending down...   Recent Labs   Lab 10/31/22  0544   TROPONINI 0.846*     Repeat today still trending down .  Demand ischemia in setting of HF per cards

## 2022-11-02 NOTE — PROGRESS NOTES
[facility] ([facility])  [facility] Medicine  Progress Note    Patient Name: Hafsa Hawley  MRN: [id number]  Patient Class: IP- Inpatient   Admission Date: 10/28/2022  Length of Stay: 4 days  Attending Physician: Colt Evans MD  Primary Care Provider: Cristobal Ann MD        Subjective:     Principal Problem:NICM (nonischemic cardiomyopathy)        HPI:  56 y/o female with h/o combined systolic and diastolic CHF(EF 25% 3/2021), re-presents to the ED 24 hours later with persistent SOB not relieved with IV lasix in ED yesterday and trial of outpt diuresis at home. Her BNP is 2700, 2200 yesterday. Creatinine 1.8--- her baseline. TPN 1.061. 1.061 yesterday. Her EKG has prolonged QT and PVC's. No ST changes consistent with an infarct pattern. She says she has not taken her metolazone in about 2 weeks. She thinks this may be why she tipped over the edge. Admitted for supervised IV diuresis on telemetry.       Overview/Hospital Course:  Put out 2 liters since admit   BP is up this am. 170s/100s   Creatinine 1.9, 1.8 at admit   Reports some mild improvement in her SOB   97% on 3 liters       10/30  Diureses has slowed down a bit. Put out 900ml last 24 hours and is negative 300 for the shift.  BP much better after adding Bidil.  Ranging /54-58  She is 92-94% on 1.5 LPM   BMP pending at time of note.       10/31 pt was adfmitted for IV diureses with combined systolic/diastolic heart failure EF 25%. She was started on lasix 40mg IV in ER 10/28 and outpt -1925. Received 40mg IV lasix BID 10/29 and outpt was -300 and again yesterday had one dose IV lasix 40mg and outpt was -500-- total output --2725. She is feeling better though creat has increased from 1.8>2.5>2.3 today- further diuretics on hold. She does c./o chest pressure which she reports is her norm. On 2L NC sats 93% on 2L NC (has O2 as needed at home). Troponin had been trending down- can repeat today >> still trending down    11/1/22  Hafsa Hawley  is a 57 y.o. female admitted with acute on chronic Combined systolic/diastolic heart failure EF 25%  Negative 3,650 since admission. Creat 2.5>2.3>2.2 ; last dose of furosemide on 10/30  O2 sat 95-98% on 2LNC; uses home O2 prn ,    Tmax 99.3 , BP 96/53- 125/62- bidil stopped; getting entresto, metoprolol Xl 100mg daily , MG+ 1.5 ; supplement   TNI Max 1.061; trended down to 0.846 ; cardiology documenting demand ischemia in setting of Heart failure.    Family brought CPAP but patient not using much; advised use of CPAP ; pt understands and agrees to increase use.   Pt notes she had previous w/u with heart transplant team and told she is not candidate for heart transplant due to CKD. She is very discouraged and tearful on exam today. Will restart cymbalta 30mg daily. Creat cl 43. .    11/2/22 Hafsa Hawley is a 57 y.o. female admitted with acute on chronic Combined systolic/diastolic heart failure EF 25%. Negative 3,659 .  Creat 2.5>2.3>2.2>2.1  ;   last dose of furosemide on 10/30  O2 sat 95-98% on 2LNC; uses home O2 prn ,  CPAP encouraged. Notes breathing is ok, feels she is close to baseline.   Afebrile  , /57 - bidil stopped; getting entresto, metoprolol Xl 100mg daily ,   TNI Max 1.061; trended down to 0.846 ; cardiology documenting demand ischemia in setting of Heart failure.  CPAP . Noted to have 10 beat run NSVT yesterday; MG+ 2.3 , K+ 4.2   Cymbalta 30mg daily.added yesterday for depression.  Creat cl 43.          Past Medical History:   Diagnosis Date    Anemia     Anticoagulant long-term use     Arthritis     Atrial fibrillation     CKD (chronic kidney disease), stage IV 5/8/2018    Congestive heart failure     COPD (chronic obstructive pulmonary disease)     Deep vein thrombosis     elevated bilirubin d/t Gilbert's syndrome     confirmed by [place] genetic testing, evaluated by hepatology    Encounter for blood transfusion     GERD (gastroesophageal reflux disease)     Hypertension      Hypertensive cardiovascular-renal disease, stage 1-4 or unspecified chronic kidney disease, with heart failure 3/4/2022    Pheochromocytoma, malignant     Restrictive lung disease 4/26/2022    Right kidney mass     Sleep apnea     Thalassemia trait, alpha     Thyroid disease     Type 2 diabetes mellitus with hyperglycemia, without long-term current use of insulin 8/13/2020       Past Surgical History:   Procedure Laterality Date    APPENDECTOMY      BONE MARROW BIOPSY      CARDIAC DEFIBRILLATOR PLACEMENT Left 12/2016    CARDIAC ELECTROPHYSIOLOGY MAPPING AND ABLATION      CARDIAC ELECTROPHYSIOLOGY MAPPING AND ABLATION      COLONOSCOPY N/A 5/6/2022    Procedure: COLONOSCOPY;  Surgeon: Arely Betancourt MD;  Location: [facility] ENDO (2ND FLR);  Service: Endoscopy;  Laterality: N/A;  heart transplant candidate/ EF 25% - 2nd floor/ defib - [facility] - [facility]  Eliquis - per Dr. Cortez with [facility] [place], Pt ok to hold Eliquis x 2 days prior-see media tab-outside correspondence dated 12/30/21  - [facility]  verbal instructions/portal instructions/email instructions - s    EYE SURGERY      due to running tears    FRACTURE SURGERY Left     hand 5th digit    HYSTERECTOMY      KNEE SURGERY Left 2016    hematoma    LIVER BIOPSY  10/24/2018    Minimal steatosis, predominantly macrovesicular, 1%, Minimal nonspecific portal inflammation, no fibrosis. No findings on biopsy to explain elevated bilirubin levels. Could be d/t Gilbert's =?- hemolysis    RIGHT HEART CATHETERIZATION Right 12/07/2021    Procedure: INSERTION, CATHETER, RIGHT HEART;  Surgeon: Irving Cardenas MD;  Location: [facility] CATH LAB;  Service: Cardiology;  Laterality: Right;    TRANSJUGULAR BIOPSY OF LIVER N/A 10/24/2018    Procedure: BIOPSY, LIVER, TRANSJUGULAR APPROACH;  Surgeon: Carmen Diagnostic Provider;  [facility];  Service: Radiology;  Laterality: N/A;       Review of patient's allergies indicates:   Allergen Reactions    Penicillins Hives     "Iodinated contrast media Nausea And Vomiting    Oxycodone-acetaminophen Other (See Comments)     Nausea, Dizziness, Anxiety.  "I don't like how it makes me feel."   Given Hydromorphone 0.5mg IVP  Without problems.  Other reaction(s): Other (See Comments)    Diovan hct [valsartan-hydrochlorothiazide] Other (See Comments)     Causes coughing    Irinotecan      Pt has homozygosity for the TA7 promoter variant that places this individual at significantly increased risk for   severe neutropenia (grade 4) when treated with the standard dose of irinotecan (risk approximately 50%).   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 Metabolism of other drugs not listed here may also be impacted by UGT1A1 enzyme activity.       Tramadol Nausea And Vomiting     Other reaction(s): Other (See Comments)       Current Facility-Administered Medications on File Prior to Encounter   Medication    0.9%  NaCl infusion    vancomycin in dextrose 5 % 1 gram/250 mL IVPB 1,000 mg     Current Outpatient Medications on File Prior to Encounter   Medication Sig    albuterol (ACCUNEB) 0.63 mg/3 mL Nebu Take 3 mLs (0.63 mg total) by nebulization every 6 (six) hours as needed. Rescue    albuterol-ipratropium (DUO-NEB) 2.5 mg-0.5 mg/3 mL nebulizer solution Take 3 mLs by nebulization 2 (two) times a day.    allopurinoL (ZYLOPRIM) 100 MG tablet TAKE 2 TABLETS BY MOUTH ONCE DAILY    allopurinoL (ZYLOPRIM) 100 MG tablet Take 2 tablets (200 mg total) by mouth once daily.    ALPRAZolam (XANAX) 2 MG Tab Take 2 mg by mouth 2 (two) times daily as needed.    apixaban (ELIQUIS) 2.5 mg Tab Take 1 tablet (2.5 mg total) by mouth 2 (two) times a day. Decrease in dose (Patient taking differently: Take 5 mg by mouth 2 (two) times a day.)    atorvastatin (LIPITOR) 40 MG tablet Take 1 tablet (40 mg total) by mouth every evening.    " atorvastatin (LIPITOR) 40 MG tablet Take 1 tablet (40 mg total) by mouth every evening.    b complex vitamins tablet Take 1 tablet by mouth once daily.    blood sugar diagnostic (ACCU-CHEK GUIDE TEST STRIPS) Strp 1 strip by Other route 3 (three) times daily.    blood sugar diagnostic (BLOOD GLUCOSE TEST) Strp 1 strip by Misc.(Non-Drug; Combo Route) route 3 (three) times daily.    blood-glucose meter kit Use as instructed    colchicine (COLCRYS) 0.6 mg tablet TAKE 1 TABLET BY MOUTH TWICE A DAY    diclofenac sodium (VOLTAREN) 1 % Gel APPLY 2 G TOPICALLY 3 (THREE) TIMES DAILY AS NEEDED (PAIN).    diltiazem HCl (DILTIAZEM 2% CREAM) Apply topically 3 (three) times daily. Apply topically to anal area.    ELIQUIS 5 mg Tab TAKE 1 TABLET BY MOUTH TWICE A DAY    empagliflozin (JARDIANCE) 25 mg tablet Jardiance 25 mg tablet    ergocalciferol (ERGOCALCIFEROL) 50,000 unit Cap Take 1 capsule (50,000 Units total) by mouth every 7 days.    ergocalciferol (ERGOCALCIFEROL) 50,000 unit Cap Take 1 capsule (50,000 Units total) by mouth every 7 days.    ferrous sulfate 325 (65 FE) MG EC tablet Take 1 tablet (325 mg total) by mouth once daily.    fluticasone propionate (FLONASE) 50 mcg/actuation nasal spray 2 sprays by Each Nostril route daily as needed for Rhinitis.     furosemide (LASIX) 80 MG tablet Take 1 tablet (80 mg total) by mouth once daily. Use afternoon dose if needed    glimepiride (AMARYL) 2 MG tablet TAKE 1 TABLET BY MOUTH EVERY DAY WITH BREAKFAST    glimepiride (AMARYL) 2 MG tablet Take 1 tablet (2 mg total) by mouth once daily with breakfast    hydrocortisone 2.5 % cream APPLY TOPICALLY 2 (TWO) TIMES DAILY AS NEEDED (HEMORRHOIDS).    lancets (ACCU-CHEK SOFTCLIX LANCETS) Misc 1 Device by Misc.(Non-Drug; Combo Route) route 3 (three) times daily.    LIDOcaine (LIDODERM) 5 % Place 1 patch onto the skin once daily. Remove & Discard patch within 12 hours or as directed by MD    LIDOcaine (LIDODERM) 5 % Place  1 patch onto the skin once daily. Remove & discard patch within 12 hours or as directed by MD    linaCLOtide (LINZESS) 145 mcg Cap capsule Take 1 capsule (145 mcg total) by mouth before breakfast. Hold if diarrhea    metOLazone (ZAROXOLYN) 5 MG tablet Take 5 mg by mouth.    metoprolol succinate (TOPROL-XL) 100 MG 24 hr tablet Take 100 mg by mouth once daily.    mometasone-formoterol (DULERA) 200-5 mcg/actuation inhaler Inhale 2 puffs into the lungs 2 (two) times daily. Controller    mometasone-formoterol (DULERA) 200-5 mcg/actuation inhaler Inhale 2 puffs into the lungs 2 times daily. controller    montelukast (SINGULAIR) 10 mg tablet Take 1 tablet every day by oral route for 30 days.    nitroGLYCERIN (NITROSTAT) 0.4 MG SL tablet Please see attached for detailed directions    NITROSTAT 0.4 mg SL tablet Take 2.5 tablets (1 mg total) by mouth every 5 (five) minutes as needed for Chest pain. No more than 3 tablets in 15 minutes.    nystatin (MYCOSTATIN) powder Apply topically 2 (two) times daily.    olopatadine (PATADAY) 0.2 % Drop Instill 1 drop into both eyes once daily    ondansetron (ZOFRAN-ODT) 8 MG TbDL Take 8 mg by mouth every 8 (eight) hours as needed.    pantoprazole (PROTONIX) 40 MG tablet TAKE 1 TABLET BY MOUTH DAILY IN THE MORNING    pantoprazole (PROTONIX) 40 MG tablet Take 1 tablet (40 mg total) by mouth every morning.    polyethylene glycol (GLYCOLAX) 17 gram PwPk Take 17 g by mouth 3 (three) times daily as needed (constipation/hard stools).    promethazine-codeine 6.25-10 mg/5 ml (PHENERGAN WITH CODEINE) 6.25-10 mg/5 mL syrup TAKE 5 mLs BY MOUTH EVERY 4 - 6 HOURS AS NEEDED    rOPINIRole (REQUIP) 4 MG tablet Take 1 tablet (4 mg total) by mouth once daily. Pt taking 4mg daily    sacubitriL-valsartan (ENTRESTO)  mg per tablet Take 1 tablet by mouth 2 (two) times daily.    sacubitriL-valsartan (ENTRESTO)  mg per tablet Take 1 tablet by mouth 2 (two) times a day.    scopolamine  "(TRANSDERM-SCOP) 1.3-1.5 mg (1 mg over 3 days) SMARTSIG:Patch(s) T-DERMAL Every 3 Days    SPIRIVA WITH HANDIHALER 18 mcg inhalation capsule INHALE 1 CAPSULE INTO THE LUNGS ONCE DAILY.    VENTOLIN HFA 90 mcg/actuation inhaler Inhale 2 puffs into the lungs 2 (two) times daily as needed.     walker Misc 1 Device by Misc.(Non-Drug; Combo Route) route as needed.     Family History       Problem Relation (Age of Onset)    Cancer Mother    Cirrhosis Brother    Diabetes Brother    Heart attack Father    Heart disease Father, Sister, Brother, Sister, Brother    Hypertension Father, Brother          Tobacco Use    Smoking status: Never    Smokeless tobacco: Never   Substance and Sexual Activity    Alcohol use: Yes     Comment: up to 1 yr ago drank 2-3 drinks on occasion but sporadic    Drug use: No    Sexual activity: Yes     Partners: Male     Review of Systems   Constitutional:  Negative for chills and fever.   Respiratory:  Positive for shortness of breath (improving). Negative for wheezing.    Cardiovascular:  Negative for chest pain and palpitations.   Gastrointestinal:  Negative for abdominal pain, blood in stool, constipation and nausea.   Genitourinary:  Negative for difficulty urinating.   Psychiatric/Behavioral:  Positive for dysphoric mood (feels "frustrated" because cardiology "wont give me what I need"-a transplant). Negative for sleep disturbance. The patient is nervous/anxious.         Tearful /crying    Objective:     Vital Signs (Most Recent):  Temp: 97.8 °F (36.6 °C) (11/02/22 0318)  Pulse: (!) 57 (11/02/22 0600)  Resp: 20 (11/02/22 0318)  BP: (!) 112/57 (11/02/22 0318)  SpO2: 98 % (11/02/22 0318)   Vital Signs (24h Range):  Temp:  [97.6 °F (36.4 °C)-98.6 °F (37 °C)] 97.8 °F (36.6 °C)  Pulse:  [57-75] 57  Resp:  [16-20] 20  SpO2:  [97 %-98 %] 98 %  BP: (104-119)/(57-66) 112/57     Weight: 103 kg (227 lb 1.2 oz)  Body mass index is 36.65 kg/m².    Physical Exam  Vitals and nursing note reviewed. "   Constitutional:       Appearance: She is well-developed. She is obese.   HENT:      Head: Normocephalic and atraumatic.      Right Ear: External ear normal.      Left Ear: External ear normal.      Nose: Nose normal.   Eyes:      Extraocular Movements: EOM normal.      Pupils: Pupils are equal, round, and reactive to light.   Neck:      Thyroid: No thyromegaly.      Vascular: No JVD.      Trachea: No tracheal deviation.   Cardiovascular:      Rate and Rhythm: Normal rate.      Heart sounds: Normal heart sounds. No murmur heard.  Pulmonary:      Effort: Pulmonary effort is normal. No respiratory distress.      Breath sounds: No wheezing or rales.   Abdominal:      General: Bowel sounds are normal. There is no distension.      Palpations: Abdomen is soft.      Tenderness: There is no abdominal tenderness.   Musculoskeletal:         General: Normal range of motion.      Cervical back: Normal range of motion and neck supple.      Right lower leg: No edema.      Left lower leg: No edema.   Lymphadenopathy:      Cervical: No cervical adenopathy.   Skin:     General: Skin is warm and dry.   Neurological:      Mental Status: She is alert and oriented to person, place, and time.      Cranial Nerves: No cranial nerve deficit.      Motor: No abnormal muscle tone.      Deep Tendon Reflexes: Reflexes are normal and symmetric.   Psychiatric:         Mood and Affect: Mood is anxious and depressed.         Thought Content: Thought content normal.      Comments: Crying ; will resume cymbalta         CRANIAL NERVES     CN III, IV, VI   Pupils are equal, round, and reactive to light.  Extraocular motions are normal.      Significant Labs:       10/30 mag 1.5  BMP  Lab Results   Component Value Date     11/01/2022    K 4.1 11/01/2022     11/01/2022    CO2 26 11/01/2022    BUN 61 (H) 11/01/2022    CREATININE 2.2 (H) 11/01/2022    CALCIUM 8.9 11/01/2022    ANIONGAP 12 11/01/2022    ESTGFRAFRICA 29.7 (A) 04/26/2022     EGFRNONAA 25.7 (A) 04/26/2022       Recent Labs   Lab 10/28/22  1426 10/29/22  0916 10/31/22  0544   CPK 45  45  --   --    CPKMB 3.6  --   --    TROPONINI 1.061*   < > 0.846*   MB 8.0*  --   --     < > = values in this interval not displayed.       Lab Results   Component Value Date    WBC 4.98 10/31/2022    HGB 9.5 (L) 10/31/2022    HCT 32.8 (L) 10/31/2022    MCV 60 (L) 10/31/2022     10/31/2022       BNP  Recent Labs   Lab 10/28/22  1426   BNP 2,771*       10/28 covid negative   Flu negative       Significant Imaging:     Cxr Opacification of left lower chest suggests atelectasis versus consolidated pneumonia with possible effusion    EKG Sinus rhythm with Premature atrial complexes   Possible Left atrial enlargement   Left axis deviation   LVH with QRS widening and repolarization abnormality   Prolonged QT   Abnormal ECG   When compared with ECG of 28-OCT-2022 14:37,   Premature ventricular complexes are no longer Present   Premature atrial complexes are now Present   Confirmed by MYA LOPEZ, Roney ([id number]) on 10/29/2022 10:18:57 AM      Assessment/Plan:      * NICM (nonischemic cardiomyopathy)  Last EF 30% per [facility] records  She sees Dr. Cortez   toptrinityol  entresto  Lasix 80 daily at home   Will start with 40mg IV BID here   Continue home meds   Dobutamine? [facility] to see Monday     10/31: [facility] following. Holding lasix. Monitor renal fxn. Cont other meds same dose for now per [facility] recommendations  11/1  Holding lasix   Follow cardiology recommendations    NSVT (nonsustained ventricular tachycardia)    Hx of Vt, post ICD   Noted NSVT 10 beat run yesterday   amiodarone added per Cardiology     Hypertensive cardiovascular-renal disease, stage 1-4 or unspecified chronic kidney disease, with heart failure  Currently at her baseline creatinine, 1.8  Will monitor closely while diuresing   BMP  Lab Results   Component Value Date     11/01/2022    K 4.1 11/01/2022     11/01/2022    CO2 26 11/01/2022    BUN 61 (H)  11/01/2022    CREATININE 2.2 (H) 11/01/2022    CALCIUM 8.9 11/01/2022    ANIONGAP 12 11/01/2022    ESTGFRAFRICA 29.7 (A) 04/26/2022    EGFRNONAA 25.7 (A) 04/26/2022       10/31:  She is a little dry lasix held today and is better creat 2.5>2.3    Lasix held this am as her creat has bunmped. She ahs diuresed 2775 since admission. Bp this am 110/72, cont entresto and BB  Will not give back IVF just hold diuretic and let her naturally improve on her own. OK for PO fluids    Type 2 diabetes mellitus without complication, without long-term current use of insulin  Patient's FSGs are controlled on current medication regimen  Last A1c reviewed-   Lab Results   Component Value Date    HGBA1C 6.5 (H) 08/23/2022     Most recent fingerstick glucose reviewed-   Recent Labs   Lab 11/01/22  0732 11/01/22  1131 11/01/22  1628 11/01/22  1856   POCTGLUCOSE 139* 154* 145* 174*     Current correctional scale  Medium  Increase anti-hyperglycemic dose as follows-   Antihyperglycemics (From admission, onward)    Start     Stop Route Frequency Ordered    10/29/22 0715  insulin aspart U-100 pen 7 Units         -- SubQ 3 times daily with meals 10/28/22 1915    10/28/22 1914  insulin aspart U-100 pen 0-5 Units         -- SubQ Before meals & nightly PRN 10/28/22 1915        Hold Oral hypoglycemics while patient is in the hospital. this am  11/1 >149     Depression due to physical illness  Patient has persistent depression which is moderate and is currently uncontrolled. Will Begin anti-depressant medications. We will not consult psychiatry at this time. Patient does not display psychosis at this time. Continue to monitor closely and adjust plan of care as needed.  Pt tearful and upset over her condition. Reports she was deemed not a candidate for heart transplant in [place]. She does not have means to go to [place]  Used to take Cymbalta, will resume cymbalta 30mg daily   Counseled .      Chronic combined systolic and diastolic heart  failure  Last EF 25% 3/2021. [facility] may have more recent one   She sees Dr. Cortez   toptrinityol  entresto  Lasix 80 daily at home   Will start with 40mg IV BID here   Continue home meds   Add Bidil 10/29  D/c Bidil 10/30 as she has become mildly hypotensive after diuresis commenced.      10/31 Lasix held this am as her creat has bumped. She ahs diuresed 2775 since admission. Bp this am 110/72, cont entresto and BB  Will not give back IVF just hold diuretic and let her naturally improve on her own. OK for PO fluids  Has home oxygen but was not using constantly, wean today as toelrated      11/1  Holding lasix    Paroxysmal atrial fibrillation  Patient with Permanent atrial fibrillation which is controlled currently with Beta Blocker. Patient is currently in sinus rhythm.[id number] Score: 3. HASBLED Score: . Anticoagulation indicated. Anticoagulation done with eliquis..        MELISSA (obstructive sleep apnea)  She will call  to bring in her home cpap. Sounds like she has an auto cpap unit. Has not set it up at home yet. We can have respiratory set up here. Used it last night- uncomfortable but will cont to try .    11/2 using Home CPAP     Essential hypertension  entresto  Metoprolol  Add bidil 10/29  D/C bidil 10/30 as her BP this morning is 88/50  Hold BB, entresto for systolic less than 90   10/31 bp 110/72  11/1 SBP 90-120s; entresto and metoprolol ordered .    Microcytic anemia  Lab Results   Component Value Date    IRON 85 02/15/2022    TRANSFERRIN 209 02/15/2022    TIBC 309 02/15/2022    FESATURATED 28 02/15/2022      Continue home iron.      Elevated troponin  Due to her dilated cardiomyopathy and persistently low EF. Chronically leaks TPN. It is trending down...   Recent Labs   Lab 10/31/22  0544   TROPONINI 0.846*     Repeat today still trending down .  Demand ischemia in setting of HF per cards        VTE Risk Mitigation (From admission, onward)         Ordered     apixaban tablet 2.5 mg  2 times daily          10/28/22 1943     IP VTE HIGH RISK PATIENT  Once         10/28/22 1915     Place sequential compression device  Until discontinued         10/28/22 1915                Discharge Planning   CHIQUI:      Code Status: Full Code   Is the patient medically ready for discharge?:     Reason for patient still in hospital (select all that apply): Treatment  Discharge Plan A: Home                  Colt Evans MD  Department of Hospital Medicine   [place] [facility] (3rd Fl)

## 2022-11-02 NOTE — ASSESSMENT & PLAN NOTE
Patient's FSGs are controlled on current medication regimen  Last A1c reviewed-   Lab Results   Component Value Date    HGBA1C 6.5 (H) 08/23/2022     Most recent fingerstick glucose reviewed-   Recent Labs   Lab 11/01/22  0732 11/01/22  1131 11/01/22  1628 11/01/22  1856   POCTGLUCOSE 139* 154* 145* 174*     Current correctional scale  Medium  Increase anti-hyperglycemic dose as follows-   Antihyperglycemics (From admission, onward)    Start     Stop Route Frequency Ordered    10/29/22 0715  insulin aspart U-100 pen 7 Units         -- SubQ 3 times daily with meals 10/28/22 1915    10/28/22 1914  insulin aspart U-100 pen 0-5 Units         -- SubQ Before meals & nightly PRN 10/28/22 1915        Hold Oral hypoglycemics while patient is in the hospital. this am  11/1 >149

## 2022-11-02 NOTE — ASSESSMENT & PLAN NOTE
She will call  to bring in her home cpap. Sounds like she has an auto cpap unit. Has not set it up at home yet. We can have respiratory set up here. Used it last night- uncomfortable but will cont to try .    11/2 using Home CPAP

## 2022-11-02 NOTE — SUBJECTIVE & OBJECTIVE
Past Medical History:   Diagnosis Date    Anemia     Anticoagulant long-term use     Arthritis     Atrial fibrillation     CKD (chronic kidney disease), stage IV 5/8/2018    Congestive heart failure     COPD (chronic obstructive pulmonary disease)     Deep vein thrombosis     elevated bilirubin d/t Gilbert's syndrome     confirmed by [place] genetic testing, evaluated by hepatology    Encounter for blood transfusion     GERD (gastroesophageal reflux disease)     Hypertension     Hypertensive cardiovascular-renal disease, stage 1-4 or unspecified chronic kidney disease, with heart failure 3/4/2022    Pheochromocytoma, malignant     Restrictive lung disease 4/26/2022    Right kidney mass     Sleep apnea     Thalassemia trait, alpha     Thyroid disease     Type 2 diabetes mellitus with hyperglycemia, without long-term current use of insulin 8/13/2020       Past Surgical History:   Procedure Laterality Date    APPENDECTOMY      BONE MARROW BIOPSY      CARDIAC DEFIBRILLATOR PLACEMENT Left 12/2016    CARDIAC ELECTROPHYSIOLOGY MAPPING AND ABLATION      CARDIAC ELECTROPHYSIOLOGY MAPPING AND ABLATION      COLONOSCOPY N/A 5/6/2022    Procedure: COLONOSCOPY;  Surgeon: Arely Betancourt MD;  Location: [facility] ([address] [place] [address]);  Service: Endoscopy;  Laterality: N/A;  heart transplant candidate/ EF 25% - 2nd floor/ defib - [facility]ron[facility] - ER[facility]  Eliquis - per Dr. Cortez with [facility], Pt ok to hold Eliquis x 2 days prior-see media tab-outside correspondence dated 12/30/21  - [facility]  verbal instructions/portal instructions/email instructions - s    EYE SURGERY      due to running tears    FRACTURE SURGERY Left     hand 5th digit    HYSTERECTOMY      KNEE SURGERY Left 2016    hematoma    LIVER BIOPSY  10/24/2018    Minimal steatosis, predominantly macrovesicular, 1%, Minimal nonspecific portal inflammation, no fibrosis. No findings on biopsy to explain elevated bilirubin levels. Could be d/t Gilbert's =?- hemolysis    RIGHT HEART CATHETERIZATION  "Right 12/07/2021    Procedure: INSERTION, CATHETER, RIGHT HEART;  Surgeon: Irving Cardenas MD;  Location: [facility] CATH LAB;  Service: Cardiology;  Laterality: Right;    TRANSJUGULAR BIOPSY OF LIVER N/A 10/24/2018    Procedure: BIOPSY, LIVER, TRANSJUGULAR APPROACH;  Surgeon: Carmen Diagnostic Provider;  Location: [facility] OR [address] [place] [address];  Service: Radiology;  Laterality: N/A;       Review of patient's allergies indicates:   Allergen Reactions    Penicillins Hives    Iodinated contrast media Nausea And Vomiting    Oxycodone-acetaminophen Other (See Comments)     Nausea, Dizziness, Anxiety.  "I don't like how it makes me feel."   Given Hydromorphone 0.5mg IVP  Without problems.  Other reaction(s): Other (See Comments)    Diovan hct [valsartan-hydrochlorothiazide] Other (See Comments)     Causes coughing    Irinotecan      Pt has homozygosity for the TA7 promoter variant that places this individual at significantly increased risk for   severe neutropenia (grade 4) when treated with the standard dose of irinotecan (risk approximately 50%).   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 Metabolism of other drugs not listed here may also be impacted by UGT1A1 enzyme activity.       Tramadol Nausea And Vomiting     Other reaction(s): Other (See Comments)       Current Facility-Administered Medications on File Prior to Encounter   Medication    0.9%  NaCl infusion    vancomycin in dextrose 5 % 1 gram/250 mL IVPB 1,000 mg     Current Outpatient Medications on File Prior to Encounter   Medication Sig    albuterol (ACCUNEB) 0.63 mg/3 mL Nebu Take 3 mLs (0.63 mg total) by nebulization every 6 (six) hours as needed. Rescue    albuterol-ipratropium (DUO-NEB) 2.5 mg-0.5 mg/3 mL nebulizer solution Take 3 mLs by nebulization 2 (two) times a day.    allopurinoL (ZYLOPRIM) 100 MG tablet TAKE 2 TABLETS BY MOUTH ONCE DAILY    " allopurinoL (ZYLOPRIM) 100 MG tablet Take 2 tablets (200 mg total) by mouth once daily.    ALPRAZolam (XANAX) 2 MG Tab Take 2 mg by mouth 2 (two) times daily as needed.    apixaban (ELIQUIS) 2.5 mg Tab Take 1 tablet (2.5 mg total) by mouth 2 (two) times a day. Decrease in dose (Patient taking differently: Take 5 mg by mouth 2 (two) times a day.)    atorvastatin (LIPITOR) 40 MG tablet Take 1 tablet (40 mg total) by mouth every evening.    atorvastatin (LIPITOR) 40 MG tablet Take 1 tablet (40 mg total) by mouth every evening.    b complex vitamins tablet Take 1 tablet by mouth once daily.    blood sugar diagnostic (ACCU-CHEK GUIDE TEST STRIPS) Strp 1 strip by Other route 3 (three) times daily.    blood sugar diagnostic (BLOOD GLUCOSE TEST) Strp 1 strip by Misc.(Non-Drug; Combo Route) route 3 (three) times daily.    blood-glucose meter kit Use as instructed    colchicine (COLCRYS) 0.6 mg tablet TAKE 1 TABLET BY MOUTH TWICE A DAY    diclofenac sodium (VOLTAREN) 1 % Gel APPLY 2 G TOPICALLY 3 (THREE) TIMES DAILY AS NEEDED (PAIN).    diltiazem HCl (DILTIAZEM 2% CREAM) Apply topically 3 (three) times daily. Apply topically to anal area.    ELIQUIS 5 mg Tab TAKE 1 TABLET BY MOUTH TWICE A DAY    empagliflozin (JARDIANCE) 25 mg tablet Jardiance 25 mg tablet    ergocalciferol (ERGOCALCIFEROL) 50,000 unit Cap Take 1 capsule (50,000 Units total) by mouth every 7 days.    ergocalciferol (ERGOCALCIFEROL) 50,000 unit Cap Take 1 capsule (50,000 Units total) by mouth every 7 days.    ferrous sulfate 325 (65 FE) MG EC tablet Take 1 tablet (325 mg total) by mouth once daily.    fluticasone propionate (FLONASE) 50 mcg/actuation nasal spray 2 sprays by Each Nostril route daily as needed for Rhinitis.     furosemide (LASIX) 80 MG tablet Take 1 tablet (80 mg total) by mouth once daily. Use afternoon dose if needed    glimepiride (AMARYL) 2 MG tablet TAKE 1 TABLET BY MOUTH EVERY DAY WITH BREAKFAST    glimepiride (AMARYL) 2 MG tablet Take 1  tablet (2 mg total) by mouth once daily with breakfast    hydrocortisone 2.5 % cream APPLY TOPICALLY 2 (TWO) TIMES DAILY AS NEEDED (HEMORRHOIDS).    lancets (ACCU-CHEK SOFTCLIX LANCETS) Misc 1 Device by Misc.(Non-Drug; Combo Route) route 3 (three) times daily.    LIDOcaine (LIDODERM) 5 % Place 1 patch onto the skin once daily. Remove & Discard patch within 12 hours or as directed by MD    LIDOcaine (LIDODERM) 5 % Place 1 patch onto the skin once daily. Remove & discard patch within 12 hours or as directed by MD    linaCLOtide (LINZESS) 145 mcg Cap capsule Take 1 capsule (145 mcg total) by mouth before breakfast. Hold if diarrhea    metOLazone (ZAROXOLYN) 5 MG tablet Take 5 mg by mouth.    metoprolol succinate (TOPROL-XL) 100 MG 24 hr tablet Take 100 mg by mouth once daily.    mometasone-formoterol (DULERA) 200-5 mcg/actuation inhaler Inhale 2 puffs into the lungs 2 (two) times daily. Controller    mometasone-formoterol (DULERA) 200-5 mcg/actuation inhaler Inhale 2 puffs into the lungs 2 times daily. controller    montelukast (SINGULAIR) 10 mg tablet Take 1 tablet every day by oral route for 30 days.    nitroGLYCERIN (NITROSTAT) 0.4 MG SL tablet Please see attached for detailed directions    NITROSTAT 0.4 mg SL tablet Take 2.5 tablets (1 mg total) by mouth every 5 (five) minutes as needed for Chest pain. No more than 3 tablets in 15 minutes.    nystatin (MYCOSTATIN) powder Apply topically 2 (two) times daily.    olopatadine (PATADAY) 0.2 % Drop Instill 1 drop into both eyes once daily    ondansetron (ZOFRAN-ODT) 8 MG TbDL Take 8 mg by mouth every 8 (eight) hours as needed.    pantoprazole (PROTONIX) 40 MG tablet TAKE 1 TABLET BY MOUTH DAILY IN THE MORNING    pantoprazole (PROTONIX) 40 MG tablet Take 1 tablet (40 mg total) by mouth every morning.    polyethylene glycol (GLYCOLAX) 17 gram PwPk Take 17 g by mouth 3 (three) times daily as needed (constipation/hard stools).    promethazine-codeine 6.25-10 mg/5 ml  "(PHENERGAN WITH CODEINE) 6.25-10 mg/5 mL syrup TAKE 5 mLs BY MOUTH EVERY 4 - 6 HOURS AS NEEDED    rOPINIRole (REQUIP) 4 MG tablet Take 1 tablet (4 mg total) by mouth once daily. Pt taking 4mg daily    sacubitriL-valsartan (ENTRESTO)  mg per tablet Take 1 tablet by mouth 2 (two) times daily.    sacubitriL-valsartan (ENTRESTO)  mg per tablet Take 1 tablet by mouth 2 (two) times a day.    scopolamine (TRANSDERM-SCOP) 1.3-1.5 mg (1 mg over 3 days) SMARTSIG:Patch(s) T-DERMAL Every 3 Days    SPIRIVA WITH HANDIHALER 18 mcg inhalation capsule INHALE 1 CAPSULE INTO THE LUNGS ONCE DAILY.    VENTOLIN HFA 90 mcg/actuation inhaler Inhale 2 puffs into the lungs 2 (two) times daily as needed.     walker Misc 1 Device by Misc.(Non-Drug; Combo Route) route as needed.     Family History       Problem Relation (Age of Onset)    Cancer Mother    Cirrhosis Brother    Diabetes Brother    Heart attack Father    Heart disease Father, Sister, Brother, Sister, Brother    Hypertension Father, Brother          Tobacco Use    Smoking status: Never    Smokeless tobacco: Never   Substance and Sexual Activity    Alcohol use: Yes     Comment: up to 1 yr ago drank 2-3 drinks on occasion but sporadic    Drug use: No    Sexual activity: Yes     Partners: Male     Review of Systems   Constitutional:  Negative for chills and fever.   Respiratory:  Positive for shortness of breath (improving). Negative for wheezing.    Cardiovascular:  Negative for chest pain and palpitations.   Gastrointestinal:  Negative for abdominal pain, blood in stool, constipation and nausea.   Genitourinary:  Negative for difficulty urinating.   Psychiatric/Behavioral:  Positive for dysphoric mood (feels "frustrated" because cardiology "wont give me what I need"-a transplant). Negative for sleep disturbance. The patient is nervous/anxious.         Tearful /crying    Objective:     Vital Signs (Most Recent):  Temp: 97.8 °F (36.6 °C) (11/02/22 0318)  Pulse: (!) 57 " (11/02/22 0600)  Resp: 20 (11/02/22 0318)  BP: (!) 112/57 (11/02/22 0318)  SpO2: 98 % (11/02/22 0318)   Vital Signs (24h Range):  Temp:  [97.6 °F (36.4 °C)-98.6 °F (37 °C)] 97.8 °F (36.6 °C)  Pulse:  [57-75] 57  Resp:  [16-20] 20  SpO2:  [97 %-98 %] 98 %  BP: (104-119)/(57-66) 112/57     Weight: 103 kg (227 lb 1.2 oz)  Body mass index is 36.65 kg/m².    Physical Exam  Vitals and nursing note reviewed.   Constitutional:       Appearance: She is well-developed. She is obese.   HENT:      Head: Normocephalic and atraumatic.      Right Ear: External ear normal.      Left Ear: External ear normal.      Nose: Nose normal.   Eyes:      Extraocular Movements: EOM normal.      Pupils: Pupils are equal, round, and reactive to light.   Neck:      Thyroid: No thyromegaly.      Vascular: No JVD.      Trachea: No tracheal deviation.   Cardiovascular:      Rate and Rhythm: Normal rate.      Heart sounds: Normal heart sounds. No murmur heard.  Pulmonary:      Effort: Pulmonary effort is normal. No respiratory distress.      Breath sounds: No wheezing or rales.   Abdominal:      General: Bowel sounds are normal. There is no distension.      Palpations: Abdomen is soft.      Tenderness: There is no abdominal tenderness.   Musculoskeletal:         General: Normal range of motion.      Cervical back: Normal range of motion and neck supple.      Right lower leg: No edema.      Left lower leg: No edema.   Lymphadenopathy:      Cervical: No cervical adenopathy.   Skin:     General: Skin is warm and dry.   Neurological:      Mental Status: She is alert and oriented to person, place, and time.      Cranial Nerves: No cranial nerve deficit.      Motor: No abnormal muscle tone.      Deep Tendon Reflexes: Reflexes are normal and symmetric.   Psychiatric:         Mood and Affect: Mood is anxious and depressed.         Thought Content: Thought content normal.      Comments: Crying ; will resume cymbalta         CRANIAL NERVES     CN III, IV, VI    Pupils are equal, round, and reactive to light.  Extraocular motions are normal.      Significant Labs:       10/30 mag 1.5  BMP  Lab Results   Component Value Date     11/01/2022    K 4.1 11/01/2022     11/01/2022    CO2 26 11/01/2022    BUN 61 (H) 11/01/2022    CREATININE 2.2 (H) 11/01/2022    CALCIUM 8.9 11/01/2022    ANIONGAP 12 11/01/2022    ESTGFRAFRICA 29.7 (A) 04/26/2022    EGFRNONAA 25.7 (A) 04/26/2022       Recent Labs   Lab 10/28/22  1426 10/29/22  0916 10/31/22  0544   CPK 45  45  --   --    CPKMB 3.6  --   --    TROPONINI 1.061*   < > 0.846*   MB 8.0*  --   --     < > = values in this interval not displayed.       Lab Results   Component Value Date    WBC 4.98 10/31/2022    HGB 9.5 (L) 10/31/2022    HCT 32.8 (L) 10/31/2022    MCV 60 (L) 10/31/2022     10/31/2022       BNP  Recent Labs   Lab 10/28/22  1426   BNP 2,771*       10/28 covid negative   Flu negative       Significant Imaging:     Cxr Opacification of left lower chest suggests atelectasis versus consolidated pneumonia with possible effusion    EKG Sinus rhythm with Premature atrial complexes   Possible Left atrial enlargement   Left axis deviation   LVH with QRS widening and repolarization abnormality   Prolonged QT   Abnormal ECG   When compared with ECG of 28-OCT-2022 14:37,   Premature ventricular complexes are no longer Present   Premature atrial complexes are now Present   Confirmed by Roney ROQUE MD ([id number]) on 10/29/2022 10:18:57 AM

## 2022-11-02 NOTE — PROGRESS NOTES
[place] - [facility] Surg ([facility])  Cardiology  Progress Note    Patient Name: Hafsa Hawley  MRN: [id number]  Admission Date: 10/28/2022  Hospital Length of Stay: 4 days  Code Status: Full Code   Attending Physician: Colt Evans MD   Primary Care Physician: Cristobal Ann MD  Expected Discharge Date:   Principal Problem:NICM (nonischemic cardiomyopathy)    Subjective:     Hospital Course: 56 y/o female with h/o combined systolic and diastolic CHF(EF 25% 3/2021), re-presents to the ED 24 hours later with persistent SOB not relieved with IV lasix in ED yesterday and trial of outpt diuresis at home. Her BNP is 2700, 2200 yesterday. Creatinine 1.8--- her baseline. TPN 1.061. 1.061 yesterday. Her EKG has prolonged QT and PVC's. No ST changes consistent with an infarct pattern. She says she has not taken her metolazone in about 2 weeks. She thinks this may be why she tipped over the edge. Admitted for supervised IV diuresis on telemetry     Hospital Course:  10/29: IV diuresis. Neg 2L. CHF medications optimized.   10/30: [facility] consulted for dobutamine recommendations/considerations in setting of hypotension. Bidil on hold   10/31: Diuretics are on hold due to worsening renal function and labile BP  11/1: 12 beat run of VT overnight. Mag level pending. Pt does have AICD in place. Consider interrogation.  Labile BP. Renal function stable. -3.6L since admit. Consider adjusting ARNI/B  11/2:  10 beat run of V-tach noted overnight.  Mag level is stable.  Renal function remains essentially unchanged.  She remains hemodynamically stable.  Bidil is still currently on hold.    ROS  Cardiovascular:  Positive for chest pain.   Respiratory:  Positive for shortness of breath.    All other systems reviewed and are negative.    Objective:     Vital Signs (Most Recent):  Temp: 97.8 °F (36.6 °C) (11/02/22 0318)  Pulse: (!) 56 (11/02/22 0724)  Resp: 19 (11/02/22 0724)  BP: (!) 112/57 (11/02/22 0318)  SpO2: 95 % (11/02/22 0724)   Vital  Signs (24h Range):  Temp:  [97.6 °F (36.4 °C)-98.6 °F (37 °C)] 97.8 °F (36.6 °C)  Pulse:  [56-70] 56  Resp:  [18-20] 19  SpO2:  [95 %-98 %] 95 %  BP: (104-119)/(57-66) 112/57     Weight: 103 kg (227 lb 1.2 oz)  Body mass index is 36.65 kg/m².    SpO2: 95 %  O2 Device (Oxygen Therapy): nasal cannula      Intake/Output Summary (Last 24 hours) at 11/2/2022 0852  Last data filed at 11/2/2022 0552  Gross per 24 hour   Intake 1140.51 ml   Output 1150 ml   Net -9.49 ml       Lines/Drains/Airways       Peripheral Intravenous Line  Duration                  Peripheral IV - Single Lumen 10/28/22 1438 18 G Right Antecubital 4 days                    Physical Exam  Vitals reviewed.   Constitutional:       Appearance: She is obese.   HENT:      Head: Normocephalic and atraumatic.      Mouth/Throat:      Mouth: Mucous membranes are moist.   Cardiovascular:      Rate and Rhythm: Normal rate and regular rhythm.   Pulmonary:      Effort: Pulmonary effort is normal.      Breath sounds: Normal breath sounds.   Musculoskeletal:         General: Normal range of motion.      Right lower leg: No edema.      Left lower leg: No edema.   Skin:     General: Skin is warm and dry.   Neurological:      Mental Status: She is alert.   Psychiatric:      Comments: Anxious and tangential        Significant Labs: CMP   Recent Labs   Lab 11/01/22  0605 11/02/22  0612    140   K 4.1 4.2    104   CO2 26 25   * 128*   BUN 61* 67*   CREATININE 2.2* 2.1*   CALCIUM 8.9 9.1   ANIONGAP 12 11   , CBC   Recent Labs   Lab 11/02/22  0612   WBC 4.06   HGB 9.2*   HCT 32.5*      , Troponin No results for input(s): TROPONINI in the last 48 hours., and All pertinent lab results from the last 24 hours have been reviewed.    Significant Imaging: Echocardiogram: Transthoracic echo (TTE) complete (Cupid Only):   Results for orders placed or performed during the hospital encounter of 11/30/21   Echo   Result Value Ref Range    Ascending aorta 3.85  "cm    STJ 2.91 cm    AV mean gradient 7 mmHg    Ao peak marcos 1.70 m/s    Ao VTI 26.09 cm    IVRT 81.35 msec    IVS 1.58 (A) 0.6 - 1.1 cm    LA size 5.54 cm    Left Atrium Major Axis 6.40 cm    Left Atrium Minor Axis 6.60 cm    LVIDd 6.86 (A) 3.5 - 6.0 cm    LVIDs 5.36 (A) 2.1 - 4.0 cm    LVOT diameter 2.32 cm    LVOT peak VTI 14.96 cm    Posterior Wall 1.37 (A) 0.6 - 1.1 cm    MV Peak A Marcos 0.89 m/s    E wave deceleration time 194.40 msec    MV Peak E Marcos 0.97 m/s    PV Peak D Marcos 0.49 m/s    PV Peak S Marcos 0.22 m/s    RA Major Axis 5.92 cm    RA Width 3.94 cm    RVDD 4.56 cm    Sinus 3.18 cm    TAPSE 1.90 cm    TR Max Marcos 3.32 m/s    TDI LATERAL 0.05 m/s    TDI SEPTAL 0.04 m/s    LA WIDTH 3.90 cm    MV stenosis pressure 1/2 time 56.38 ms    LV Diastolic Volume 243.82 mL    LV Systolic Volume 138.80 mL    RV S' 12.65 cm/s    LVOT peak marcos 1.06 m/s    PISA TR VN Nyquist 0.01 m/s    Mr max marcos 0.05 m/s    LA volume (mod) 71.84 cm3    MV "A" wave duration 11.13 msec    Radius 0.53 cm    LV LATERAL E/E' RATIO 19.40 m/s    LV SEPTAL E/E' RATIO 24.25 m/s    FS 22 %    LA volume 119.34 cm3    LV mass 517.48 g    Left Ventricle Relative Wall Thickness 0.40 cm    AV valve area 2.42 cm2    AV Velocity Ratio 0.62     AV index (prosthetic) 0.57     MV valve area p 1/2 method 3.90 cm2    E/A ratio 1.09     Mean e' 0.05 m/s    Pulm vein S/D ratio 0.45     LVOT area 4.2 cm2    LVOT stroke volume 63.21 cm3    AV peak gradient 12 mmHg    E/E' ratio 21.56 m/s    LV Systolic Volume Index 66.7 mL/m2    LV Diastolic Volume Index 117.22 mL/m2    LA Volume Index 57.4 mL/m2    LV Mass Index 249 g/m2    Triscuspid Valve Regurgitation Peak Gradient 44 mmHg    LA Volume Index (Mod) 34.5 mL/m2    BSA 2.16 m2    Right Atrial Pressure (from IVC) 15 mmHg    EF 25 %    TV rest pulmonary artery pressure 59 mmHg    Narrative    · The left ventricle is severely enlarged with eccentric hypertrophy and   severely decreased systolic function. The " estimated ejection fraction is   25%.  · There is severe left ventricular global hypokinesis.  · Mild right ventricular enlargement with moderately reduced right   ventricular systolic function.  · Grade II left ventricular diastolic dysfunction.  · Biatrial enlargement.  · The estimated PA systolic pressure is 59 mmHg.  · Elevated central venous pressure (15 mmHg).  · The ascending aorta is mildly dilated.  · Small posterolateral pericardial effusion.       and X-Ray: CXR: X-Ray Chest 1 View (CXR):   Results for orders placed or performed during the hospital encounter of 10/28/22   X-Ray Chest 1 View    Narrative    EXAMINATION:  XR CHEST 1 VIEW    CLINICAL HISTORY:  CHF, ? pneumonia;    TECHNIQUE:  Erect AP view of the chest.    COMPARISON:  10/28/2022    FINDINGS:  Single view of the chest demonstrates evidence of a single lead pacemaker device projected over the patient's left upper chest wall distal leads deployed within the right atrium.  Heart appears globally enlarged with evidence of left ventricular dominance that appears similar in capacity as compared to previous.  Mild central vascular congestion changes are apparent with trace left-sided pleural effusion obscuring the left costophrenic angle.  No evidence of pulmonic infiltrate or mass lesion.  No evidence of pneumothorax.  Tracheal lumen is midline without evidence of shift.  The upper abdominal cavity appears normal.  Regional skeleton appears unremarkable.      Impression    1. Mild cardiomegaly.  2. Findings compatible with uncompensated congestive heart failure.  3. Indwelling pacemaker.  4. No appreciable change upon comparison.      Electronically signed by: Andrea Escalante MD  Date:    11/01/2022  Time:    08:30     Assessment and Plan:       Active Diagnoses:    Diagnosis Date Noted POA    PRINCIPAL PROBLEM:  NICM (nonischemic cardiomyopathy) [I42.8] 10/27/2016 Yes     Chronic    NSVT (nonsustained ventricular tachycardia) [I47.29] 11/02/2022  Yes    Hypertensive cardiovascular-renal disease, stage 1-4 or unspecified chronic kidney disease, with heart failure [I13.0] 03/04/2022 Yes    Type 2 diabetes mellitus without complication, without long-term current use of insulin [E11.9] 08/13/2020 Yes    Depression due to physical illness [F06.31] 05/08/2018 Yes    Chronic combined systolic and diastolic heart failure [I50.42] 03/16/2018 Yes    Paroxysmal atrial fibrillation [I48.0] 08/25/2016 Yes     Chronic    MELISSA (obstructive sleep apnea) [G47.33] 08/23/2016 Yes     Chronic    Essential hypertension [I10] 07/22/2016 Yes     Chronic    Elevated troponin [R77.8] 07/20/2016 Yes    Microcytic anemia [D50.9] 07/20/2016 Yes     Chronic      Problems Resolved During this Admission:    Diagnosis Date Noted Date Resolved POA    Fibromyalgia affecting multiple sites [M79.7] 06/15/2016 10/30/2022 Yes     Chronic       VTE Risk Mitigation (From admission, onward)           Ordered     apixaban tablet 2.5 mg  2 times daily         10/28/22 1943     IP VTE HIGH RISK PATIENT  Once         10/28/22 1915     Place sequential compression device  Until discontinued         10/28/22 1915                  Current Facility-Administered Medications   Medication    acetaminophen tablet 650 mg    amiodarone tablet 400 mg    Followed by    [START ON 11/8/2022] amiodarone tablet 400 mg    Followed by    [START ON 11/11/2022] amiodarone tablet 200 mg    apixaban tablet 2.5 mg    atorvastatin tablet 40 mg    dextrose 10% bolus 125 mL    dextrose 10% bolus 250 mL    DULoxetine DR capsule 30 mg    ferrous sulfate tablet 1 each    glucagon (human recombinant) injection 1 mg    glucose chewable tablet 16 g    glucose chewable tablet 24 g    insulin aspart U-100 pen 0-5 Units    insulin aspart U-100 pen 7 Units    levalbuterol nebulizer solution 1.25 mg    melatonin tablet 6 mg    metoprolol succinate (TOPROL-XL) 24 hr tablet 50 mg    mupirocin 2 % ointment    ondansetron injection 8 mg     pantoprazole EC tablet 40 mg    rOPINIRole tablet 4 mg    sacubitriL-valsartan 49-51 mg per tablet 1 tablet    sodium chloride 0.9% flush 10 mL    tiotropium bromide 2.5 mcg/actuation inhaler 2 puff     Facility-Administered Medications Ordered in Other Encounters   Medication    0.9%  NaCl infusion    vancomycin in dextrose 5 % 1 gram/250 mL IVPB 1,000 mg     Dx/Plan:  Acute on chronic diastolic HF; appears euvolemic this AM  NICMO; note non sustained VT on tele, has AICD; not a candidate for heart transplant in past  Cardiorenal Syndrome  PAF:  MELISSA; trying CPAP  HTN  Elevated TNI     Continue apixaban, atorvastatin, toprol,   Reduce entresto   Consider decreasing ARNI in setting of labile BP  Hold iv diuresis for now. Consider restarting home po furosemide 80 mg qd when renal function stable  Consider nephrology consult  Encourage CPAP compliance  Continue amiodarone, will reduce dose on DC        Cecilia Adhikari NP scribed for Dr. Sears  Cardiology  [place] - Med Surg (3rd Fl)    I have personally interviewed and examined this patient face-to-face, and as the physician. Documented the above plan and rendered all medical decision making for this encounter. I have read and agree with the above documentation unless otherwise noted.

## 2022-11-02 NOTE — NURSING
Arely Linares. NP notified of patient with 5-beat run of V-tach; patient asymptomatic. Will continue to monitor.

## 2022-11-03 VITALS
TEMPERATURE: 98 F | WEIGHT: 227.06 LBS | BODY MASS INDEX: 36.49 KG/M2 | SYSTOLIC BLOOD PRESSURE: 123 MMHG | HEART RATE: 63 BPM | DIASTOLIC BLOOD PRESSURE: 72 MMHG | HEIGHT: 66 IN | OXYGEN SATURATION: 97 % | RESPIRATION RATE: 18 BRPM

## 2022-11-03 LAB
ANION GAP SERPL CALC-SCNC: 9 MMOL/L (ref 8–16)
BUN SERPL-MCNC: 70 MG/DL (ref 6–20)
CALCIUM SERPL-MCNC: 9.4 MG/DL (ref 8.7–10.5)
CHLORIDE SERPL-SCNC: 103 MMOL/L (ref 95–110)
CO2 SERPL-SCNC: 27 MMOL/L (ref 23–29)
CREAT SERPL-MCNC: 2.2 MG/DL (ref 0.5–1.4)
EST. GFR  (NO RACE VARIABLE): 26 ML/MIN/1.73 M^2
GLUCOSE SERPL-MCNC: 130 MG/DL (ref 70–110)
POCT GLUCOSE: 116 MG/DL (ref 70–110)
POCT GLUCOSE: 117 MG/DL (ref 70–110)
POCT GLUCOSE: 149 MG/DL (ref 70–110)
POTASSIUM SERPL-SCNC: 4.9 MMOL/L (ref 3.5–5.1)
SODIUM SERPL-SCNC: 139 MMOL/L (ref 136–145)

## 2022-11-03 PROCEDURE — 94640 AIRWAY INHALATION TREATMENT: CPT

## 2022-11-03 PROCEDURE — 25000003 PHARM REV CODE 250: Performed by: NURSE PRACTITIONER

## 2022-11-03 PROCEDURE — 94760 N-INVAS EAR/PLS OXIMETRY 1: CPT

## 2022-11-03 PROCEDURE — 27000221 HC OXYGEN, UP TO 24 HOURS

## 2022-11-03 PROCEDURE — 99239 HOSP IP/OBS DSCHRG MGMT >30: CPT | Mod: ,,, | Performed by: FAMILY MEDICINE

## 2022-11-03 PROCEDURE — 80048 BASIC METABOLIC PNL TOTAL CA: CPT | Performed by: NURSE PRACTITIONER

## 2022-11-03 PROCEDURE — 63600175 PHARM REV CODE 636 W HCPCS: Performed by: FAMILY MEDICINE

## 2022-11-03 PROCEDURE — 36415 COLL VENOUS BLD VENIPUNCTURE: CPT | Performed by: NURSE PRACTITIONER

## 2022-11-03 PROCEDURE — 99239 PR HOSPITAL DISCHARGE DAY,>30 MIN: ICD-10-PCS | Mod: ,,, | Performed by: FAMILY MEDICINE

## 2022-11-03 PROCEDURE — 25000003 PHARM REV CODE 250: Performed by: FAMILY MEDICINE

## 2022-11-03 RX ORDER — ALLOPURINOL 100 MG/1
100 TABLET ORAL DAILY
Qty: 60 TABLET | Refills: 2 | Status: ON HOLD | OUTPATIENT
Start: 2022-11-03 | End: 2023-02-03 | Stop reason: SDUPTHER

## 2022-11-03 RX ORDER — BENZONATATE 200 MG/1
200 CAPSULE ORAL 3 TIMES DAILY PRN
Qty: 30 CAPSULE | Refills: 0 | Status: SHIPPED | OUTPATIENT
Start: 2022-11-03 | End: 2022-11-13

## 2022-11-03 RX ORDER — AMIODARONE HYDROCHLORIDE 400 MG/1
400 TABLET ORAL 2 TIMES DAILY
Qty: 10 TABLET | Refills: 0 | Status: SHIPPED | OUTPATIENT
Start: 2022-11-03 | End: 2022-11-08

## 2022-11-03 RX ORDER — AMIODARONE HYDROCHLORIDE 200 MG/1
200 TABLET ORAL DAILY
Qty: 30 TABLET | Refills: 0 | Status: ON HOLD | OUTPATIENT
Start: 2022-11-12 | End: 2023-02-03 | Stop reason: HOSPADM

## 2022-11-03 RX ORDER — AMIODARONE HYDROCHLORIDE 400 MG/1
400 TABLET ORAL DAILY
Qty: 3 TABLET | Refills: 0 | Status: SHIPPED | OUTPATIENT
Start: 2022-11-08 | End: 2022-11-11

## 2022-11-03 RX ORDER — BENZONATATE 100 MG/1
200 CAPSULE ORAL 3 TIMES DAILY PRN
Status: DISCONTINUED | OUTPATIENT
Start: 2022-11-03 | End: 2022-11-03 | Stop reason: HOSPADM

## 2022-11-03 RX ORDER — FUROSEMIDE 80 MG/1
40 TABLET ORAL DAILY
Qty: 60 TABLET | Refills: 0 | Status: ON HOLD
Start: 2022-11-04 | End: 2022-11-22 | Stop reason: SDUPTHER

## 2022-11-03 RX ORDER — DULOXETIN HYDROCHLORIDE 30 MG/1
30 CAPSULE, DELAYED RELEASE ORAL DAILY
Qty: 30 CAPSULE | Refills: 0 | Status: SHIPPED | OUTPATIENT
Start: 2022-11-04 | End: 2022-11-15

## 2022-11-03 RX ADMIN — APIXABAN 2.5 MG: 2.5 TABLET, FILM COATED ORAL at 08:11

## 2022-11-03 RX ADMIN — METOPROLOL SUCCINATE 50 MG: 50 TABLET, EXTENDED RELEASE ORAL at 08:11

## 2022-11-03 RX ADMIN — ONDANSETRON 8 MG: 2 INJECTION INTRAMUSCULAR; INTRAVENOUS at 07:11

## 2022-11-03 RX ADMIN — SACUBITRIL AND VALSARTAN 1 TABLET: 49; 51 TABLET, FILM COATED ORAL at 08:11

## 2022-11-03 RX ADMIN — INSULIN ASPART 7 UNITS: 100 INJECTION, SOLUTION INTRAVENOUS; SUBCUTANEOUS at 08:11

## 2022-11-03 RX ADMIN — DULOXETINE 30 MG: 30 CAPSULE, DELAYED RELEASE ORAL at 08:11

## 2022-11-03 RX ADMIN — TIOTROPIUM BROMIDE INHALATION SPRAY 2 PUFF: 3.12 SPRAY, METERED RESPIRATORY (INHALATION) at 06:11

## 2022-11-03 RX ADMIN — FERROUS SULFATE TAB 325 MG (65 MG ELEMENTAL FE) 1 EACH: 325 (65 FE) TAB at 08:11

## 2022-11-03 RX ADMIN — INSULIN ASPART 7 UNITS: 100 INJECTION, SOLUTION INTRAVENOUS; SUBCUTANEOUS at 12:11

## 2022-11-03 RX ADMIN — AMIODARONE HYDROCHLORIDE 400 MG: 200 TABLET ORAL at 08:11

## 2022-11-03 RX ADMIN — BENZONATATE 200 MG: 100 CAPSULE ORAL at 08:11

## 2022-11-03 RX ADMIN — PANTOPRAZOLE SODIUM 40 MG: 40 TABLET, DELAYED RELEASE ORAL at 08:11

## 2022-11-03 NOTE — ASSESSMENT & PLAN NOTE
Currently at her baseline creatinine, 1.8  Will monitor closely while diuresing   BMP  Lab Results   Component Value Date     11/03/2022    K 4.9 11/03/2022     11/03/2022    CO2 27 11/03/2022    BUN 70 (H) 11/03/2022    CREATININE 2.2 (H) 11/03/2022    CALCIUM 9.4 11/03/2022    ANIONGAP 9 11/03/2022    ESTGFRAFRICA 29.7 (A) 04/26/2022    EGFRNONAA 25.7 (A) 04/26/2022       10/31:  She is a little dry lasix held today and is better creat 2.5>2.3    Lasix held this am as her creat has bunmped. She ahs diuresed 2775 since admission. Bp this am 110/72, cont entresto and BB  Will not give back IVF just hold diuretic and let her naturally improve on her own. OK for PO fluids

## 2022-11-03 NOTE — NURSING
Discharge instructions reviewed with patient. Reviewed follow-up care, appointments, DM, heart failure and when to call MD. Voiced understanding.  No further questions.

## 2022-11-03 NOTE — PROGRESS NOTES
[place] - [facility] Surg ([facility])  Cardiology  Progress Note    Patient Name: Hafsa Hawley  MRN: [id number]  Admission Date: 10/28/2022  Hospital Length of Stay: 5 days  Code Status: Full Code   Attending Physician: Colt Evans MD   Primary Care Physician: Cristobal Ann MD  Expected Discharge Date:   Principal Problem:NICM (nonischemic cardiomyopathy)    Subjective:     Hospital Course:  56 y/o female with h/o combined systolic and diastolic CHF(EF 25% 3/2021), re-presents to the ED 24 hours later with persistent SOB not relieved with IV lasix in ED yesterday and trial of outpt diuresis at home. Her BNP is 2700, 2200 yesterday. Creatinine 1.8--- her baseline. TPN 1.061. 1.061 yesterday. Her EKG has prolonged QT and PVC's. No ST changes consistent with an infarct pattern. She says she has not taken her metolazone in about 2 weeks. She thinks this may be why she tipped over the edge. Admitted for supervised IV diuresis on telemetry    Interval History: 10/29: IV diuresis. Neg 2L. CHF medications optimized.   10/30: [facility] consulted for dobutamine recommendations/considerations in setting of hypotension. Bidil on hold   10/31: Diuretics are on hold due to worsening renal function and labile BP  11/1: 12 beat run of VT overnight. Mag level pending. Pt does have AICD in place. Consider interrogation.  Labile BP. Renal function stable. -3.6L since admit. Consider adjusting ARNI/B  11/2:  10 beat run of V-tach noted overnight.  Mag level is stable.  Renal function remains essentially unchanged.  She remains hemodynamically stable.  Bidil is still currently on hold.    ROS  Cardiovascular:  Positive for chest pain.   Respiratory:  Positive for shortness of breath.    All other systems reviewed and are negative.    Objective:     Vital Signs (Most Recent):  Temp: 97.1 °F (36.2 °C) (11/03/22 0405)  Pulse: 65 (11/03/22 0800)  Resp: 16 (11/03/22 0658)  BP: (!) 91/52 (11/03/22 0405)  SpO2: 97 % (11/03/22 0658)   Vital Signs  (24h Range):  Temp:  [96.9 °F (36.1 °C)-98 °F (36.7 °C)] 97.1 °F (36.2 °C)  Pulse:  [55-70] 65  Resp:  [16-20] 16  SpO2:  [97 %-99 %] 97 %  BP: ()/(52-77) 91/52     Weight: 103 kg (227 lb 1.2 oz)  Body mass index is 36.65 kg/m².    SpO2: 97 %  O2 Device (Oxygen Therapy): nasal cannula w/ humidification      Intake/Output Summary (Last 24 hours) at 11/3/2022 0815  Last data filed at 11/3/2022 0504  Gross per 24 hour   Intake 342 ml   Output 400 ml   Net -58 ml       Lines/Drains/Airways       Peripheral Intravenous Line  Duration                  Peripheral IV - Single Lumen 10/28/22 1438 18 G Right Antecubital 5 days                    Physical Exam  Constitutional:       Appearance: She is obese.   HENT:      Head: Normocephalic and atraumatic.      Mouth/Throat:      Mouth: Mucous membranes are moist.   Cardiovascular:      Rate and Rhythm: Normal rate and regular rhythm.   Pulmonary:      Effort: Pulmonary effort is normal.      Breath sounds: Normal breath sounds.   Musculoskeletal:         General: Normal range of motion.      Right lower leg: No edema.      Left lower leg: No edema.   Skin:     General: Skin is warm and dry.   Neurological:      Mental Status: She is alert.   Psychiatric:      Comments: Anxious and tangential      Significant Labs: BMP:   Recent Labs   Lab 11/01/22  1756 11/02/22  0612   GLU  --  128*   NA  --  140   K  --  4.2   CL  --  104   CO2  --  25   BUN  --  67*   CREATININE  --  2.1*   CALCIUM  --  9.1   MG 2.4 2.3   , CMP   Recent Labs   Lab 11/02/22  0612      K 4.2      CO2 25   *   BUN 67*   CREATININE 2.1*   CALCIUM 9.1   ANIONGAP 11   , CBC   Recent Labs   Lab 11/02/22  0612   WBC 4.06   HGB 9.2*   HCT 32.5*      , and Troponin No results for input(s): TROPONINI in the last 48 hours.    Assessment and Plan:       Active Diagnoses:    Diagnosis Date Noted POA    PRINCIPAL PROBLEM:  NICM (nonischemic cardiomyopathy) [I42.8] 10/27/2016 Yes      Chronic    NSVT (nonsustained ventricular tachycardia) [I47.29] 11/02/2022 Yes    Hypertensive cardiovascular-renal disease, stage 1-4 or unspecified chronic kidney disease, with heart failure [I13.0] 03/04/2022 Yes    Type 2 diabetes mellitus without complication, without long-term current use of insulin [E11.9] 08/13/2020 Yes    Depression due to physical illness [F06.31] 05/08/2018 Yes    Chronic combined systolic and diastolic heart failure [I50.42] 03/16/2018 Yes    Paroxysmal atrial fibrillation [I48.0] 08/25/2016 Yes     Chronic    MELISSA (obstructive sleep apnea) [G47.33] 08/23/2016 Yes     Chronic    Essential hypertension [I10] 07/22/2016 Yes     Chronic    Elevated troponin [R77.8] 07/20/2016 Yes    Microcytic anemia [D50.9] 07/20/2016 Yes     Chronic      Problems Resolved During this Admission:    Diagnosis Date Noted Date Resolved POA    Fibromyalgia affecting multiple sites [M79.7] 06/15/2016 10/30/2022 Yes     Chronic       VTE Risk Mitigation (From admission, onward)           Ordered     apixaban tablet 2.5 mg  2 times daily         10/28/22 1943     IP VTE HIGH RISK PATIENT  Once         10/28/22 1915     Place sequential compression device  Until discontinued         10/28/22 1915                  Dx/Plan:  Acute on chronic diastolic HF resolved; appears euvolemic this AM  NICMO; note non sustained VT on tele, has AICD; not a candidate for heart transplant in past  Cardiorenal Syndrome  PAF:  MELISSA; trying CPAP  HTN  Elevated TNI     PLAN:  Continue apixaban, atorvastatin, toprol,   Reduced entresto   Consider restarting home po furosemide 80 mg qod   Consider nephrology consult  Encourage CPAP compliance  Continue amiodarone, will reduce dose on DC to 200 mg qd  Will sign off    Chris Shepard NP scribed for Dr Sears  Cardiology  [place] - Med Surg (3rd Fl)    I have personally interviewed and examined this patient face-to-face, and as the physician. Documented the above plan and rendered all  medical decision making for this encounter. I have read and agree with the above documentation unless otherwise noted.

## 2022-11-03 NOTE — ASSESSMENT & PLAN NOTE
Patient's FSGs are controlled on current medication regimen  Last A1c reviewed-   Lab Results   Component Value Date    HGBA1C 6.5 (H) 08/23/2022     Most recent fingerstick glucose reviewed-   Recent Labs   Lab 11/02/22  1130 11/02/22  1620 11/02/22 2001 11/03/22  0712   POCTGLUCOSE 170* 110 149* 116*     Current correctional scale  Medium  Increase anti-hyperglycemic dose as follows-   Antihyperglycemics (From admission, onward)    Start     Stop Route Frequency Ordered    10/29/22 0715  insulin aspart U-100 pen 7 Units         -- SubQ 3 times daily with meals 10/28/22 1915    10/28/22 1914  insulin aspart U-100 pen 0-5 Units         -- SubQ Before meals & nightly PRN 10/28/22 1915        Hold Oral hypoglycemics while patient is in the hospital. this am  11/1 >149   11/3 glucose 116

## 2022-11-03 NOTE — PROGRESS NOTES
[facility] ([facility])  [facility] Medicine  Progress Note    Patient Name: Hafsa Hawley  MRN: [id number]  Patient Class: IP- Inpatient   Admission Date: 10/28/2022  Length of Stay: 5 days  Attending Physician: Colt Evans MD  Primary Care Provider: Cristobal Ann MD        Subjective:     Principal Problem:NICM (nonischemic cardiomyopathy)        HPI:  58 y/o female with h/o combined systolic and diastolic CHF(EF 25% 3/2021), re-presents to the ED 24 hours later with persistent SOB not relieved with IV lasix in ED yesterday and trial of outpt diuresis at home. Her BNP is 2700, 2200 yesterday. Creatinine 1.8--- her baseline. TPN 1.061. 1.061 yesterday. Her EKG has prolonged QT and PVC's. No ST changes consistent with an infarct pattern. She says she has not taken her metolazone in about 2 weeks. She thinks this may be why she tipped over the edge. Admitted for supervised IV diuresis on telemetry.       Overview/Hospital Course:  Put out 2 liters since admit   BP is up this am. 170s/100s   Creatinine 1.9, 1.8 at admit   Reports some mild improvement in her SOB   97% on 3 liters       10/30  Diureses has slowed down a bit. Put out 900ml last 24 hours and is negative 300 for the shift.  BP much better after adding Bidil.  Ranging /54-58  She is 92-94% on 1.5 LPM   BMP pending at time of note.       10/31 pt was adfmitted for IV diureses with combined systolic/diastolic heart failure EF 25%. She was started on lasix 40mg IV in ER 10/28 and outpt -1925. Received 40mg IV lasix BID 10/29 and outpt was -300 and again yesterday had one dose IV lasix 40mg and outpt was -500-- total output --2725. She is feeling better though creat has increased from 1.8>2.5>2.3 today- further diuretics on hold. She does c./o chest pressure which she reports is her norm. On 2L NC sats 93% on 2L NC (has O2 as needed at home). Troponin had been trending down- can repeat today >> still trending down    11/1/22  Hafsa Hawley  is a 57 y.o. female admitted with acute on chronic Combined systolic/diastolic heart failure EF 25%  Negative 3,650 since admission. Creat 2.5>2.3>2.2 ; last dose of furosemide on 10/30  O2 sat 95-98% on 2LNC; uses home O2 prn ,    Tmax 99.3 , BP 96/53- 125/62- bidil stopped; getting entresto, metoprolol Xl 100mg daily , MG+ 1.5 ; supplement   TNI Max 1.061; trended down to 0.846 ; cardiology documenting demand ischemia in setting of Heart failure.    Family brought CPAP but patient not using much; advised use of CPAP ; pt understands and agrees to increase use.   Pt notes she had previous w/u with heart transplant team and told she is not candidate for heart transplant due to CKD. She is very discouraged and tearful on exam today. Will restart cymbalta 30mg daily. Creat cl 43. .    11/2/22 Hafsa Hawley is a 57 y.o. female admitted with acute on chronic Combined systolic/diastolic heart failure EF 25%. Negative 3,659 .  Creat 2.5>2.3>2.2>2.1  ;   last dose of furosemide on 10/30  O2 sat 95-98% on 2LNC; uses home O2 prn ,  CPAP encouraged. Notes breathing is ok, feels she is close to baseline.   Afebrile  , /57 - bidil stopped; getting entresto, metoprolol Xl 100mg daily ,   TNI Max 1.061; trended down to 0.846 ; cardiology documenting demand ischemia in setting of Heart failure.  CPAP . Noted to have 10 beat run NSVT yesterday; MG+ 2.3 , K+ 4.2   Cymbalta 30mg daily.added yesterday for depression.  Creat cl 43.      11/3/22 Hafsa Hawley is a 57 y.o. female admitted with acute on chronic Combined systolic/diastolic heart failure EF 25%. Negative 3,359 .  Creat 2.5>2.3>2.2>2.1>2.2  ;   last dose of furosemide on 10/30; K+ 4.2>4.9   Pt sitting up on bedside this am. NAD, speaking full sentences. C/o cough this am - grandson with cold.  O2 sat 95-98% on 2LNC; uses home O2 prn ,  CPAP encouraged.   Afebrile  , BP 1102/56- 91/52  - bidil stopped; getting entresto, metoprolol Xl 100mg daily ,   CPAP . Noted  to have 10 beat run NSVT 11/1; lytes stable , amiodarone added per Cardiology   Cymbalta added for depression.              Review of Systems   Constitutional:  Negative for chills and fever.   Respiratory:  Positive for cough and shortness of breath (improving). Negative for wheezing.    Cardiovascular:  Negative for chest pain and palpitations.   Gastrointestinal:  Negative for abdominal pain, blood in stool, constipation and nausea.   Genitourinary:  Negative for difficulty urinating.   Psychiatric/Behavioral:  Positive for dysphoric mood. Negative for sleep disturbance. The patient is nervous/anxious.    Objective:     Vital Signs (Most Recent):  Temp: 97.1 °F (36.2 °C) (11/03/22 0405)  Pulse: (!) 57 (11/03/22 0658)  Resp: 16 (11/03/22 0658)  BP: (!) 91/52 (11/03/22 0405)  SpO2: 97 % (11/03/22 0658)   Vital Signs (24h Range):  Temp:  [96.9 °F (36.1 °C)-98 °F (36.7 °C)] 97.1 °F (36.2 °C)  Pulse:  [55-70] 57  Resp:  [16-20] 16  SpO2:  [97 %-99 %] 97 %  BP: ()/(52-77) 91/52     Weight: 103 kg (227 lb 1.2 oz)  Body mass index is 36.65 kg/m².    Physical Exam  Vitals and nursing note reviewed.   Constitutional:       Appearance: She is well-developed. She is obese.      Comments: Sitting up on side of bed   HENT:      Head: Normocephalic and atraumatic.      Right Ear: External ear normal.      Left Ear: External ear normal.      Nose: Nose normal.   Eyes:      Extraocular Movements: EOM normal.      Pupils: Pupils are equal, round, and reactive to light.   Neck:      Thyroid: No thyromegaly.      Vascular: No JVD.      Trachea: No tracheal deviation.   Cardiovascular:      Rate and Rhythm: Normal rate.      Heart sounds: Murmur heard.   Pulmonary:      Effort: Pulmonary effort is normal. No respiratory distress.      Breath sounds: No wheezing or rales.      Comments: Speaking in full sentences  Abdominal:      General: Bowel sounds are normal. There is no distension.      Palpations: Abdomen is soft.       Tenderness: There is no abdominal tenderness.   Musculoskeletal:         General: Normal range of motion.      Cervical back: Normal range of motion and neck supple.      Right lower leg: No edema.      Left lower leg: No edema.   Lymphadenopathy:      Cervical: No cervical adenopathy.   Skin:     General: Skin is warm and dry.   Neurological:      Mental Status: She is alert and oriented to person, place, and time.      Cranial Nerves: No cranial nerve deficit.      Motor: No abnormal muscle tone.      Deep Tendon Reflexes: Reflexes are normal and symmetric.   Psychiatric:         Mood and Affect: Mood is anxious and depressed.         Thought Content: Thought content normal.      Comments: Crying ; will resume cymbalta         CRANIAL NERVES     CN III, IV, VI   Pupils are equal, round, and reactive to light.  Extraocular motions are normal.      Significant Labs:       10/30 mag 1.5  BMP  Lab Results   Component Value Date     11/02/2022    K 4.2 11/02/2022     11/02/2022    CO2 25 11/02/2022    BUN 67 (H) 11/02/2022    CREATININE 2.1 (H) 11/02/2022    CALCIUM 9.1 11/02/2022    ANIONGAP 11 11/02/2022    ESTGFRAFRICA 29.7 (A) 04/26/2022    EGFRNONAA 25.7 (A) 04/26/2022       Recent Labs   Lab 10/28/22  1426 10/29/22  0916 10/31/22  0544   CPK 45  45  --   --    CPKMB 3.6  --   --    TROPONINI 1.061*   < > 0.846*   MB 8.0*  --   --     < > = values in this interval not displayed.       Lab Results   Component Value Date    WBC 4.06 11/02/2022    HGB 9.2 (L) 11/02/2022    HCT 32.5 (L) 11/02/2022    MCV 60 (L) 11/02/2022     11/02/2022       BNP  Recent Labs   Lab 11/02/22  0612   *       10/28 covid negative   Flu negative       Significant Imaging:     Cxr Opacification of left lower chest suggests atelectasis versus consolidated pneumonia with possible effusion    EKG Sinus rhythm with Premature atrial complexes   Possible Left atrial enlargement   Left axis deviation   LVH with QRS  widening and repolarization abnormality   Prolonged QT   Abnormal ECG   When compared with ECG of 28-OCT-2022 14:37,   Premature ventricular complexes are no longer Present   Premature atrial complexes are now Present   Confirmed by Roney ROQUE MD ([id number]) on 10/29/2022 10:18:57 AM      Assessment/Plan:      * NICM (nonischemic cardiomyopathy)  Last EF 30% per [facility] records  She sees Dr. Cortez   toprolol  entresto  Lasix 80 daily at home   Will start with 40mg IV BID here   Continue home meds   Dobutamine? [facility] to see Monday     10/31: [facility] following. Holding lasix. Monitor renal fxn. Cont other meds same dose for now per [facility] recommendations  11/1  Holding lasix   Follow cardiology recommendations    11/3  Will f/u with dr. Cortez.  At this time, cards rec 80 QOD of lasix. Patient says her cards told her to take 40 daily.    NSVT (nonsustained ventricular tachycardia)    Hx of Vt, post ICD   Noted NSVT 10 beat run yesterday   amiodarone added per Cardiology     Hypertensive cardiovascular-renal disease, stage 1-4 or unspecified chronic kidney disease, with heart failure  Currently at her baseline creatinine, 1.8  Will monitor closely while diuresing   BMP  Lab Results   Component Value Date     11/02/2022    K 4.2 11/02/2022     11/02/2022    CO2 25 11/02/2022    BUN 67 (H) 11/02/2022    CREATININE 2.1 (H) 11/02/2022    CALCIUM 9.1 11/02/2022    ANIONGAP 11 11/02/2022    ESTGFRAFRICA 29.7 (A) 04/26/2022    EGFRNONAA 25.7 (A) 04/26/2022       10/31:  She is a little dry lasix held today and is better creat 2.5>2.3    Lasix held this am as her creat has bunmped. She ahs diuresed 2775 since admission. Bp this am 110/72, cont entresto and BB  Will not give back IVF just hold diuretic and let her naturally improve on her own. OK for PO fluids    Type 2 diabetes mellitus without complication, without long-term current use of insulin  Patient's FSGs are controlled on current medication regimen  Last A1c reviewed-   Lab  Results   Component Value Date    HGBA1C 6.5 (H) 08/23/2022     Most recent fingerstick glucose reviewed-   Recent Labs   Lab 11/02/22  1130 11/02/22  1620 11/02/22 2001 11/03/22  0712   POCTGLUCOSE 170* 110 149* 116*     Current correctional scale  Medium  Increase anti-hyperglycemic dose as follows-   Antihyperglycemics (From admission, onward)    Start     Stop Route Frequency Ordered    10/29/22 0715  insulin aspart U-100 pen 7 Units         -- SubQ 3 times daily with meals 10/28/22 1915    10/28/22 1914  insulin aspart U-100 pen 0-5 Units         -- SubQ Before meals & nightly PRN 10/28/22 1915        Hold Oral hypoglycemics while patient is in the hospital. this am  11/1 >149   11/3 glucose 116     Depression due to physical illness  Patient has persistent depression which is moderate and is currently uncontrolled. Will Begin anti-depressant medications. We will not consult psychiatry at this time. Patient does not display psychosis at this time. Continue to monitor closely and adjust plan of care as needed.  Pt tearful and upset over her condition. Reports she was deemed not a candidate for heart transplant in [place]. She does not have means to go to [place]  Used to take Cymbalta, will resume cymbalta 30mg daily   Counseled .      Chronic combined systolic and diastolic heart failure  Last EF 25% 3/2021. [facility] may have more recent one   She sees Dr. Cortez   toprolol  entresto  Lasix 80 daily at home   Will start with 40mg IV BID here   Continue home meds   Add Bidil 10/29  D/c Bidil 10/30 as she has become mildly hypotensive after diuresis commenced.      10/31 Lasix held this am as her creat has bumped. She ahs diuresed 2775 since admission. Bp this am 110/72, cont entresto and BB  Will not give back IVF just hold diuretic and let her naturally improve on her own. OK for PO fluids  Has home oxygen but was not using constantly, wean today as toelrated      11/1  Holding lasix    Paroxysmal atrial  fibrillation  Patient with Permanent atrial fibrillation which is controlled currently with Beta Blocker. Patient is currently in sinus rhythm.[id number] Score: 3. HASBLED Score: . Anticoagulation indicated. Anticoagulation done with eliquis..        MELISSA (obstructive sleep apnea)  She will call  to bring in her home cpap. Sounds like she has an auto cpap unit. Has not set it up at home yet. We can have respiratory set up here. Used it last night- uncomfortable but will cont to try .    11/2 using Home CPAP     Essential hypertension  entresto  Metoprolol  Add bidil 10/29  D/C bidil 10/30 as her BP this morning is 88/50  Hold BB, entresto for systolic less than 90   10/31 bp 110/72  11/1 SBP 90-120s; entresto and metoprolol ordered .    11/3  123/72    Microcytic anemia  Lab Results   Component Value Date    IRON 85 02/15/2022    TRANSFERRIN 209 02/15/2022    TIBC 309 02/15/2022    FESATURATED 28 02/15/2022      Continue home iron.      Elevated troponin  Due to her dilated cardiomyopathy and persistently low EF. Chronically leaks TPN. It is trending down...   Recent Labs   Lab 10/31/22  0544   TROPONINI 0.846*     Repeat today still trending down .  Demand ischemia in setting of HF per cards        VTE Risk Mitigation (From admission, onward)         Ordered     apixaban tablet 2.5 mg  2 times daily         10/28/22 1943     IP VTE HIGH RISK PATIENT  Once         10/28/22 1915     Place sequential compression device  Until discontinued         10/28/22 1915                Discharge Planning   CHIQUI:      Code Status: Full Code   Is the patient medically ready for discharge?:     Reason for patient still in hospital (select all that apply): Pending disposition  Discharge Plan A: Home                  Krysta Tony MD  Department of Hospital Medicine   [place] [facility] S[facility] (3rd Fl)

## 2022-11-03 NOTE — DISCHARGE SUMMARY
Gavi. Suzanne - Med Surg ([facility])  [facility] Medicine  Discharge Summary      Patient Name: Hafsa Hawley  MRN: [id number]  GLENN: [id number]  Patient Class: IP- Inpatient  Admission Date: 10/28/2022  Hospital Length of Stay: 5 days  Discharge Date and Time:  11/03/2022 2:23 PM  Attending Physician: Colt Evans MD   Discharging Provider: Arely Linares NP  Primary Care Provider: Cristobal Ann MD    Primary Care Team: Networked reference to record PCT     HPI:   58 y/o female with h/o combined systolic and diastolic CHF(EF 25% 3/2021), re-presents to the ED 24 hours later with persistent SOB not relieved with IV lasix in ED yesterday and trial of outpt diuresis at home. Her BNP is 2700, 2200 yesterday. Creatinine 1.8--- her baseline. TPN 1.061. 1.061 yesterday. Her EKG has prolonged QT and PVC's. No ST changes consistent with an infarct pattern. She says she has not taken her metolazone in about 2 weeks. She thinks this may be why she tipped over the edge. Admitted for supervised IV diuresis on telemetry.       * No surgery found *      Hospital Course:   Put out 2 liters since admit   BP is up this am. 170s/100s   Creatinine 1.9, 1.8 at admit   Reports some mild improvement in her SOB   97% on 3 liters       10/30  Diureses has slowed down a bit. Put out 900ml last 24 hours and is negative 300 for the shift.  BP much better after adding Bidil.  Ranging /54-58  She is 92-94% on 1.5 LPM   BMP pending at time of note.       10/31 pt was adfmitted for IV diureses with combined systolic/diastolic heart failure EF 25%. She was started on lasix 40mg IV in ER 10/28 and outpt -1925. Received 40mg IV lasix BID 10/29 and outpt was -300 and again yesterday had one dose IV lasix 40mg and outpt was -500-- total output --2725. She is feeling better though creat has increased from 1.8>2.5>2.3 today- further diuretics on hold. She does c./o chest pressure which she reports is her norm. On 2L NC sats 93% on 2L NC (has  O2 as needed at home). Troponin had been trending down- can repeat today >> still trending down    11/1/22  Hafsa Hawley is a 57 y.o. female admitted with acute on chronic Combined systolic/diastolic heart failure EF 25%  Negative 3,650 since admission. Creat 2.5>2.3>2.2 ; last dose of furosemide on 10/30  O2 sat 95-98% on 2LNC; uses home O2 prn ,    Tmax 99.3 , BP 96/53- 125/62- bidil stopped; getting entresto, metoprolol Xl 100mg daily , MG+ 1.5 ; supplement   TNI Max 1.061; trended down to 0.846 ; cardiology documenting demand ischemia in setting of Heart failure.    Family brought CPAP but patient not using much; advised use of CPAP ; pt understands and agrees to increase use.   Pt notes she had previous w/u with heart transplant team and told she is not candidate for heart transplant due to CKD. She is very discouraged and tearful on exam today. Will restart cymbalta 30mg daily. Creat cl 43. .    11/2/22 Hafsa Hawley is a 57 y.o. female admitted with acute on chronic Combined systolic/diastolic heart failure EF 25%. Negative 3,659 .  Creat 2.5>2.3>2.2>2.1  ;   last dose of furosemide on 10/30  O2 sat 95-98% on 2LNC; uses home O2 prn ,  CPAP encouraged. Notes breathing is ok, feels she is close to baseline.   Afebrile  , /57 - bidil stopped; getting entresto, metoprolol Xl 100mg daily ,   TNI Max 1.061; trended down to 0.846 ; cardiology documenting demand ischemia in setting of Heart failure.  CPAP . Noted to have 10 beat run NSVT yesterday; MG+ 2.3 , K+ 4.2   Cymbalta 30mg daily.added yesterday for depression.  Creat cl 43.      11/3/22 Hafsa Hawley is a 57 y.o. female admitted with acute on chronic Combined systolic/diastolic heart failure EF 25%. Negative 3,359 .  Creat 2.5>2.3>2.2>2.1>2.2  ;   last dose of furosemide on 10/30; K+ 4.2>4.9   Pt sitting up on bedside this am. NAD, speaking full sentences. C/o cough this am - grandson with cold.  O2 sat 95-98% on 2LNC; uses home O2 prn ,  CPAP  encouraged.   Afebrile  , BP 1102/56- 91/52  - bidil stopped; getting entresto, metoprolol Xl 100mg daily ,   CPAP . Noted to have 10 beat run NSVT 11/1; lytes stable , amiodarone added per Cardiology   Cymbalta added for depression.         Goals of Care Treatment Preferences:  Code Status: Full Code      Consults:   Consults (From admission, onward)          Status Ordering Provider     Inpatient consult to Cardiology[facility]  Once        Provider:  Mayelin Evans MD    Completed AMYELIN EVANS            No new Assessment & Plan notes have been filed under this hospital service since the last note was generated.  Service: Hospital Medicine  Final Active Diagnoses:    Diagnosis Date Noted POA    PRINCIPAL PROBLEM:  NICM (nonischemic cardiomyopathy) [I42.8] 10/27/2016 Yes     Chronic    NSVT (nonsustained ventricular tachycardia) [I47.29] 11/02/2022 Yes    Hypertensive cardiovascular-renal disease, stage 1-4 or unspecified chronic kidney disease, with heart failure [I13.0] 03/04/2022 Yes    Type 2 diabetes mellitus without complication, without long-term current use of insulin [E11.9] 08/13/2020 Yes    Depression due to physical illness [F06.31] 05/08/2018 Yes    Chronic combined systolic and diastolic heart failure [I50.42] 03/16/2018 Yes    Paroxysmal atrial fibrillation [I48.0] 08/25/2016 Yes     Chronic    MELISSA (obstructive sleep apnea) [G47.33] 08/23/2016 Yes     Chronic    Essential hypertension [I10] 07/22/2016 Yes     Chronic    Elevated troponin [R77.8] 07/20/2016 Yes    Microcytic anemia [D50.9] 07/20/2016 Yes     Chronic      Problems Resolved During this Admission:    Diagnosis Date Noted Date Resolved POA    Fibromyalgia affecting multiple sites [M79.7] 06/15/2016 10/30/2022 Yes     Chronic       Discharged Condition: stable    Disposition: Home or Self Care    Follow Up:   Follow-up Information       Cristobal Ann MD. Schedule an appointment as soon as possible for a visit.    Specialty: Family  Medicine  Contact information:  [address] [facility] [address]  [phone]               Benson Cortez MD Follow up.    Specialties: Interventional Cardiology, Cardiology  Contact information:  [address] [facility] [address] [phone]                           Patient Instructions:   No discharge procedures on file.    Significant Diagnostic Studies:  10/30 mag 1.5  BMP        Lab Results   Component Value Date      11/02/2022     K 4.2 11/02/2022      11/02/2022     CO2 25 11/02/2022     BUN 67 (H) 11/02/2022     CREATININE 2.1 (H) 11/02/2022     CALCIUM 9.1 11/02/2022     ANIONGAP 11 11/02/2022     ESTGFRAFRICA 29.7 (A) 04/26/2022     EGFRNONAA 25.7 (A) 04/26/2022               Recent Labs   Lab 10/28/22  1426 10/29/22  0916 10/31/22  0544   CPK 45  45  --   --    CPKMB 3.6  --   --    TROPONINI 1.061*   < > 0.846*   MB 8.0*  --   --     < > = values in this interval not displayed.               Lab Results   Component Value Date     WBC 4.06 11/02/2022     HGB 9.2 (L) 11/02/2022     HCT 32.5 (L) 11/02/2022     MCV 60 (L) 11/02/2022      11/02/2022         BNP      Recent Labs   Lab 11/02/22  0612   *         10/28 covid negative   Flu negative         Significant Imaging:      Cxr Opacification of left lower chest suggests atelectasis versus consolidated pneumonia with possible effusion     EKG Sinus rhythm with Premature atrial complexes   Possible Left atrial enlargement   Left axis deviation   LVH with QRS widening and repolarization abnormality   Prolonged QT   Abnormal ECG   When compared with ECG of 28-OCT-2022 14:37,   Premature ventricular complexes are no longer Present   Premature atrial complexes are now Present   Confirmed by Roney ROQUE MD ([id number]) on 10/29/2022 10:18:57 AM         Pending Diagnostic Studies:       None           Medications:  Reconciled Home Medications:      Medication List        START taking these medications      * amiodarone  400 MG tablet  Commonly known as: PACERONE  Take 1 tablet (400 mg total) by mouth 2 (two) times daily. for 5 days     * amiodarone 400 MG tablet  Commonly known as: PACERONE  Take 1 tablet (400 mg total) by mouth once daily. for 3 days  Start taking on: November 8, 2022     * amiodarone 200 MG Tab  Commonly known as: PACERONE  Take 1 tablet (200 mg total) by mouth once daily.  Start taking on: November 12, 2022     benzonatate 200 MG capsule  Commonly known as: TESSALON  Take 1 capsule (200 mg total) by mouth 3 (three) times daily as needed for Cough.     DULoxetine 30 MG capsule  Commonly known as: CYMBALTA  Take 1 capsule (30 mg total) by mouth once daily.  Start taking on: November 4, 2022           * This list has 3 medication(s) that are the same as other medications prescribed for you. Read the directions carefully, and ask your doctor or other care provider to review them with you.                CHANGE how you take these medications      allopurinoL 100 MG tablet  Commonly known as: ZYLOPRIM  Take 1 tablet (100 mg total) by mouth once daily.  What changed:   how much to take  Another medication with the same name was removed. Continue taking this medication, and follow the directions you see here.     apixaban 2.5 mg Tab  Commonly known as: ELIQUIS  Take 1 tablet (2.5 mg total) by mouth 2 (two) times a day. Decrease in dose  What changed:   how much to take  Another medication with the same name was removed. Continue taking this medication, and follow the directions you see here.     atorvastatin 40 MG tablet  Commonly known as: LIPITOR  Take 1 tablet (40 mg total) by mouth every evening.  What changed: Another medication with the same name was removed. Continue taking this medication, and follow the directions you see here.     blood sugar diagnostic Strp  Commonly known as: ACCU-CHEK GUIDE TEST STRIPS  1 strip by Other route 3 (three) times daily.  What changed: Another medication with the same name was  removed. Continue taking this medication, and follow the directions you see here.     DULERA 200-5 mcg/actuation inhaler  Generic drug: mometasone-formoterol  Inhale 2 puffs into the lungs 2 (two) times daily. Controller  What changed: Another medication with the same name was removed. Continue taking this medication, and follow the directions you see here.     ENTRESTO  mg per tablet  Generic drug: sacubitriL-valsartan  Take 1 tablet by mouth 2 (two) times daily.  What changed: Another medication with the same name was removed. Continue taking this medication, and follow the directions you see here.     ergocalciferol 50,000 unit Cap  Commonly known as: ERGOCALCIFEROL  Take 1 capsule (50,000 Units total) by mouth every 7 days.  What changed: Another medication with the same name was removed. Continue taking this medication, and follow the directions you see here.     furosemide 80 MG tablet  Commonly known as: LASIX  Take 0.5 tablets (40 mg total) by mouth once daily. Use afternoon dose if needed  Start taking on: November 4, 2022  What changed: how much to take     glimepiride 2 MG tablet  Commonly known as: AMARYL  TAKE 1 TABLET BY MOUTH EVERY DAY WITH BREAKFAST  What changed: Another medication with the same name was removed. Continue taking this medication, and follow the directions you see here.     LIDOcaine 5 %  Commonly known as: LIDODERM  Place 1 patch onto the skin once daily. Remove & Discard patch within 12 hours or as directed by MD  What changed: Another medication with the same name was removed. Continue taking this medication, and follow the directions you see here.     NITROSTAT 0.4 MG SL tablet  Generic drug: nitroGLYCERIN  Take 2.5 tablets (1 mg total) by mouth every 5 (five) minutes as needed for Chest pain. No more than 3 tablets in 15 minutes.  What changed: Another medication with the same name was removed. Continue taking this medication, and follow the directions you see here.      pantoprazole 40 MG tablet  Commonly known as: PROTONIX  TAKE 1 TABLET BY MOUTH DAILY IN THE MORNING  What changed: Another medication with the same name was removed. Continue taking this medication, and follow the directions you see here.     VENTOLIN HFA 90 mcg/actuation inhaler  Generic drug: albuterol  Inhale 2 puffs into the lungs 2 (two) times daily as needed.  What changed: Another medication with the same name was removed. Continue taking this medication, and follow the directions you see here.            CONTINUE taking these medications      albuterol-ipratropium 2.5 mg-0.5 mg/3 mL nebulizer solution  Commonly known as: DUO-NEB  Take 3 mLs by nebulization 2 (two) times a day.     ALPRAZolam 2 MG Tab  Commonly known as: XANAX  Take 2 mg by mouth 2 (two) times daily as needed.     b complex vitamins tablet  Take 1 tablet by mouth once daily.     blood-glucose meter kit  Use as instructed     diclofenac sodium 1 % Gel  Commonly known as: VOLTAREN  APPLY 2 G TOPICALLY 3 (THREE) TIMES DAILY AS NEEDED (PAIN).     DILTIAZEM 2% CREAM  Apply topically 3 (three) times daily. Apply topically to anal area.     ferrous sulfate 325 (65 FE) MG EC tablet  Take 1 tablet (325 mg total) by mouth once daily.     fluticasone propionate 50 mcg/actuation nasal spray  Commonly known as: FLONASE  2 sprays by Each Nostril route daily as needed for Rhinitis.     hydrocortisone 2.5 % cream  APPLY TOPICALLY 2 (TWO) TIMES DAILY AS NEEDED (HEMORRHOIDS).     JARDIANCE 25 mg tablet  Generic drug: empagliflozin  Jardiance 25 mg tablet     lancets Misc  Commonly known as: ACCU-CHEK SOFTCLIX LANCETS  1 Device by Misc.(Non-Drug; Combo Route) route 3 (three) times daily.     linaCLOtide 145 mcg Cap capsule  Commonly known as: LINZESS  Take 1 capsule (145 mcg total) by mouth before breakfast. Hold if diarrhea     metoprolol succinate 100 MG 24 hr tablet  Commonly known as: TOPROL-XL  Take 100 mg by mouth once daily.     montelukast 10 mg  tablet  Commonly known as: SINGULAIR  Take 1 tablet every day by oral route for 30 days.     nystatin powder  Commonly known as: MYCOSTATIN  Apply topically 2 (two) times daily.     olopatadine 0.2 % Drop  Commonly known as: PATADAY  Instill 1 drop into both eyes once daily     ondansetron 8 MG Tbdl  Commonly known as: ZOFRAN-ODT  Take 8 mg by mouth every 8 (eight) hours as needed.     polyethylene glycol 17 gram Pwpk  Commonly known as: GLYCOLAX  Take 17 g by mouth 3 (three) times daily as needed (constipation/hard stools).     promethazine-codeine 6.25-10 mg/5 ml 6.25-10 mg/5 mL syrup  Commonly known as: PHENERGAN with CODEINE  TAKE 5 mLs BY MOUTH EVERY 4 - 6 HOURS AS NEEDED     rOPINIRole 4 MG tablet  Commonly known as: REQUIP  Take 1 tablet (4 mg total) by mouth once daily. Pt taking 4mg daily     scopolamine 1.3-1.5 mg (1 mg over 3 days)  Commonly known as: TRANSDERM-SCOP  SMARTSIG:Patch(s) T-DERMAL Every 3 Days     SPIRIVA WITH HANDIHALER 18 mcg inhalation capsule  Generic drug: tiotropium  INHALE 1 CAPSULE INTO THE LUNGS ONCE DAILY.     walker Misc  1 Device by Misc.(Non-Drug; Combo Route) route as needed.            STOP taking these medications      colchicine 0.6 mg tablet  Commonly known as: COLCRYS     metOLazone 5 MG tablet  Commonly known as: ZAROXOLYN              Indwelling Lines/Drains at time of discharge:   Lines/Drains/Airways       None                   Time spent on the discharge of patient: 30 minutes         Arely Linares NP  Department of Hospital Medicine  [place] [facility] Surg (3rd Fl)

## 2022-11-03 NOTE — PLAN OF CARE
11/03/22 1443   Post-Acute Status   Post-Acute Authorization Other   Other Status Awaiting f/u Appts       CM attempted to contact Family medicine clinic for hospital follow up, however was on hold for a while.   WILLIAM sent message to patient access rep, Lourdes Oquendo who will try and help getting this patient a hospital follow up.   Dr. Ann, PCP, appointment schedule is locked and I do not have override access.    WILLIAM contacted [facility] for hospital follow up with Dr. Cortez, cardiology. Representative states Dr. Cortez schedule is booked out until December therefore a message will be sent to the nurse, and they will contact patient with an appointment date/time.    Dr. Tony, attending today, notified of the above.     1607: Clinic could not get appointment made in timely manner, earliest was 11/15/2022. Per Dr. Tony, patient needs to be seen next week.   WILLIAM sent Dr. Ann an in basket message asking that him or his staff aid with getting this patient set up for next week.

## 2022-11-03 NOTE — PLAN OF CARE
Problem: Adult Inpatient Plan of Care  Goal: Plan of Care Review  Outcome: Ongoing, Progressing  Goal: Patient-Specific Goal (Individualized)  Outcome: Ongoing, Progressing  Goal: Absence of Hospital-Acquired Illness or Injury  Outcome: Ongoing, Progressing  Goal: Optimal Comfort and Wellbeing  Outcome: Ongoing, Progressing  Goal: Readiness for Transition of Care  Outcome: Ongoing, Progressing     Problem: Diabetes Comorbidity  Goal: Blood Glucose Level Within Targeted Range  Outcome: Ongoing, Progressing     Problem: Fluid and Electrolyte Imbalance (Acute Kidney Injury/Impairment)  Goal: Fluid and Electrolyte Balance  Outcome: Ongoing, Progressing     Problem: Oral Intake Inadequate (Acute Kidney Injury/Impairment)  Goal: Optimal Nutrition Intake  Outcome: Ongoing, Progressing     Problem: Renal Function Impairment (Acute Kidney Injury/Impairment)  Goal: Effective Renal Function  Outcome: Ongoing, Progressing     Problem: Fall Injury Risk  Goal: Absence of Fall and Fall-Related Injury  Outcome: Ongoing, Progressing     Problem: Adjustment to Illness (Heart Failure)  Goal: Optimal Coping  Outcome: Ongoing, Progressing     Problem: Dysrhythmia (Heart Failure)  Goal: Stable Heart Rate and Rhythm  Outcome: Ongoing, Progressing     Problem: Functional Ability Impaired (Heart Failure)  Goal: Optimal Functional Ability  Outcome: Ongoing, Progressing     Problem: Fluid Imbalance (Heart Failure)  Goal: Fluid Balance  Outcome: Ongoing, Progressing     Problem: Functional Ability Impaired (Heart Failure)  Goal: Optimal Functional Ability  Outcome: Ongoing, Progressing     Problem: Oral Intake Inadequate (Heart Failure)  Goal: Optimal Nutrition Intake  Outcome: Ongoing, Progressing     Problem: Respiratory Compromise (Heart Failure)  Goal: Effective Oxygenation and Ventilation  Outcome: Ongoing, Progressing     Problem: Sleep Disordered Breathing (Heart Failure)  Goal: Effective Breathing Pattern During Sleep  Outcome:  Ongoing, Progressing

## 2022-11-03 NOTE — ASSESSMENT & PLAN NOTE
Currently at her baseline creatinine, 1.8  Will monitor closely while diuresing   BMP  Lab Results   Component Value Date     11/02/2022    K 4.2 11/02/2022     11/02/2022    CO2 25 11/02/2022    BUN 67 (H) 11/02/2022    CREATININE 2.1 (H) 11/02/2022    CALCIUM 9.1 11/02/2022    ANIONGAP 11 11/02/2022    ESTGFRAFRICA 29.7 (A) 04/26/2022    EGFRNONAA 25.7 (A) 04/26/2022       10/31:  She is a little dry lasix held today and is better creat 2.5>2.3    Lasix held this am as her creat has bunmped. She ahs diuresed 2775 since admission. Bp this am 110/72, cont entresto and BB  Will not give back IVF just hold diuretic and let her naturally improve on her own. OK for PO fluids

## 2022-11-03 NOTE — ASSESSMENT & PLAN NOTE
Patient with Permanent atrial fibrillation which is controlled currently with Beta Blocker. Patient is currently in sinus rhythm.[id number] Score: 3. HASBLED Score: . Anticoagulation indicated. Anticoagulation done with eliquis..

## 2022-11-03 NOTE — PLAN OF CARE
Problem: Adult Inpatient Plan of Care  Goal: Plan of Care Review  Outcome: Ongoing, Progressing  Goal: Patient-Specific Goal (Individualized)  Outcome: Ongoing, Progressing  Goal: Absence of Hospital-Acquired Illness or Injury  Outcome: Ongoing, Progressing  Goal: Optimal Comfort and Wellbeing  Outcome: Ongoing, Progressing  Goal: Readiness for Transition of Care  Outcome: Ongoing, Progressing     Problem: Diabetes Comorbidity  Goal: Blood Glucose Level Within Targeted Range  Outcome: Ongoing, Progressing     Problem: Fluid and Electrolyte Imbalance (Acute Kidney Injury/Impairment)  Goal: Fluid and Electrolyte Balance  Outcome: Ongoing, Progressing     Problem: Oral Intake Inadequate (Acute Kidney Injury/Impairment)  Goal: Optimal Nutrition Intake  Outcome: Ongoing, Progressing     Problem: Renal Function Impairment (Acute Kidney Injury/Impairment)  Goal: Effective Renal Function  Outcome: Ongoing, Progressing     Problem: Fall Injury Risk  Goal: Absence of Fall and Fall-Related Injury  Outcome: Ongoing, Progressing     Problem: Gas Exchange Impaired  Goal: Optimal Gas Exchange  Outcome: Ongoing, Progressing     Problem: Adjustment to Illness (Heart Failure)  Goal: Optimal Coping  Outcome: Ongoing, Progressing     Problem: Cardiac Output Decreased (Heart Failure)  Goal: Optimal Cardiac Output  Outcome: Ongoing, Progressing     Problem: Dysrhythmia (Heart Failure)  Goal: Stable Heart Rate and Rhythm  Outcome: Ongoing, Progressing     Problem: Fluid Imbalance (Heart Failure)  Goal: Fluid Balance  Outcome: Ongoing, Progressing     Problem: Functional Ability Impaired (Heart Failure)  Goal: Optimal Functional Ability  Outcome: Ongoing, Progressing     Problem: Oral Intake Inadequate (Heart Failure)  Goal: Optimal Nutrition Intake  Outcome: Ongoing, Progressing     Problem: Respiratory Compromise (Heart Failure)  Goal: Effective Oxygenation and Ventilation  Outcome: Ongoing, Progressing     Problem: Sleep Disordered  Breathing (Heart Failure)  Goal: Effective Breathing Pattern During Sleep  Outcome: Ongoing, Progressing

## 2022-11-03 NOTE — ASSESSMENT & PLAN NOTE
Patient with Permanent atrial fibrillation which is controlled currently with Beta Blocker. Patient is currently in sinus rhythm.[id number] Score: 3. HASBLED Score: . Anticoagulation indicated. Anticoagulation done with eliquis..

## 2022-11-03 NOTE — ASSESSMENT & PLAN NOTE
Last EF 30% per [facility] records  She sees Dr. Cortez   toprolol  entresto  Lasix 80 daily at home   Will start with 40mg IV BID here   Continue home meds   Dobutamine? [facility] to see Monday     10/31: [facility] following. Holding lasix. Monitor renal fxn. Cont other meds same dose for now per [facility] recommendations  11/1  Holding lasix   Follow cardiology recommendations    11/3  Will f/u with dr. Cortez.  At this time, cards rec 80 QOD of lasix. Patient says her cards told her to take 40 daily.

## 2022-11-03 NOTE — ASSESSMENT & PLAN NOTE
entresto  Metoprolol  Add bidil 10/29  D/C bidil 10/30 as her BP this morning is 88/50  Hold BB, entresto for systolic less than 90   10/31 bp 110/72  11/1 SBP 90-120s; entresto and metoprolol ordered .    11/3  123/72

## 2022-11-03 NOTE — ASSESSMENT & PLAN NOTE
Patient's FSGs are controlled on current medication regimen  Last A1c reviewed-   Lab Results   Component Value Date    HGBA1C 6.5 (H) 08/23/2022     Most recent fingerstick glucose reviewed-   Recent Labs   Lab 11/02/22  1620 11/02/22 2001 11/03/22  0712 11/03/22  1112   POCTGLUCOSE 110 149* 116* 117*     Current correctional scale  Medium  Increase anti-hyperglycemic dose as follows-   Antihyperglycemics (From admission, onward)    Start     Stop Route Frequency Ordered    10/29/22 0715  insulin aspart U-100 pen 7 Units         -- SubQ 3 times daily with meals 10/28/22 1915    10/28/22 1914  insulin aspart U-100 pen 0-5 Units         -- SubQ Before meals & nightly PRN 10/28/22 1915        Hold Oral hypoglycemics while patient is in the hospital. this am  11/1 >149   11/3 glucose 116

## 2022-11-03 NOTE — SUBJECTIVE & OBJECTIVE
Review of Systems   Constitutional:  Negative for chills and fever.   Respiratory:  Positive for cough and shortness of breath (improving). Negative for wheezing.    Cardiovascular:  Negative for chest pain and palpitations.   Gastrointestinal:  Negative for abdominal pain, blood in stool, constipation and nausea.   Genitourinary:  Negative for difficulty urinating.   Psychiatric/Behavioral:  Positive for dysphoric mood. Negative for sleep disturbance. The patient is nervous/anxious.    Objective:     Vital Signs (Most Recent):  Temp: 97.1 °F (36.2 °C) (11/03/22 0405)  Pulse: (!) 57 (11/03/22 0658)  Resp: 16 (11/03/22 0658)  BP: (!) 91/52 (11/03/22 0405)  SpO2: 97 % (11/03/22 0658)   Vital Signs (24h Range):  Temp:  [96.9 °F (36.1 °C)-98 °F (36.7 °C)] 97.1 °F (36.2 °C)  Pulse:  [55-70] 57  Resp:  [16-20] 16  SpO2:  [97 %-99 %] 97 %  BP: ()/(52-77) 91/52     Weight: 103 kg (227 lb 1.2 oz)  Body mass index is 36.65 kg/m².    Physical Exam  Vitals and nursing note reviewed.   Constitutional:       Appearance: She is well-developed. She is obese.      Comments: Sitting up on side of bed   HENT:      Head: Normocephalic and atraumatic.      Right Ear: External ear normal.      Left Ear: External ear normal.      Nose: Nose normal.   Eyes:      Extraocular Movements: EOM normal.      Pupils: Pupils are equal, round, and reactive to light.   Neck:      Thyroid: No thyromegaly.      Vascular: No JVD.      Trachea: No tracheal deviation.   Cardiovascular:      Rate and Rhythm: Normal rate.      Heart sounds: Murmur heard.   Pulmonary:      Effort: Pulmonary effort is normal. No respiratory distress.      Breath sounds: No wheezing or rales.      Comments: Speaking in full sentences  Abdominal:      General: Bowel sounds are normal. There is no distension.      Palpations: Abdomen is soft.      Tenderness: There is no abdominal tenderness.   Musculoskeletal:         General: Normal range of motion.      Cervical  back: Normal range of motion and neck supple.      Right lower leg: No edema.      Left lower leg: No edema.   Lymphadenopathy:      Cervical: No cervical adenopathy.   Skin:     General: Skin is warm and dry.   Neurological:      Mental Status: She is alert and oriented to person, place, and time.      Cranial Nerves: No cranial nerve deficit.      Motor: No abnormal muscle tone.      Deep Tendon Reflexes: Reflexes are normal and symmetric.   Psychiatric:         Mood and Affect: Mood is anxious and depressed.         Thought Content: Thought content normal.      Comments: Crying ; will resume cymbalta         CRANIAL NERVES     CN III, IV, VI   Pupils are equal, round, and reactive to light.  Extraocular motions are normal.      Significant Labs:       10/30 mag 1.5  BMP  Lab Results   Component Value Date     11/02/2022    K 4.2 11/02/2022     11/02/2022    CO2 25 11/02/2022    BUN 67 (H) 11/02/2022    CREATININE 2.1 (H) 11/02/2022    CALCIUM 9.1 11/02/2022    ANIONGAP 11 11/02/2022    ESTGFRAFRICA 29.7 (A) 04/26/2022    EGFRNONAA 25.7 (A) 04/26/2022       Recent Labs   Lab 10/28/22  1426 10/29/22  0916 10/31/22  0544   CPK 45  45  --   --    CPKMB 3.6  --   --    TROPONINI 1.061*   < > 0.846*   MB 8.0*  --   --     < > = values in this interval not displayed.       Lab Results   Component Value Date    WBC 4.06 11/02/2022    HGB 9.2 (L) 11/02/2022    HCT 32.5 (L) 11/02/2022    MCV 60 (L) 11/02/2022     11/02/2022       BNP  Recent Labs   Lab 11/02/22  0612   *       10/28 covid negative   Flu negative       Significant Imaging:     Cxr Opacification of left lower chest suggests atelectasis versus consolidated pneumonia with possible effusion    EKG Sinus rhythm with Premature atrial complexes   Possible Left atrial enlargement   Left axis deviation   LVH with QRS widening and repolarization abnormality   Prolonged QT   Abnormal ECG   When compared with ECG of 28-OCT-2022 14:37,    Premature ventricular complexes are no longer Present   Premature atrial complexes are now Present   Confirmed by Roney ROQUE MD ([id number]) on 10/29/2022 10:18:57 AM

## 2022-11-04 ENCOUNTER — PATIENT OUTREACH (OUTPATIENT)
Dept: ADMINISTRATIVE | Facility: CLINIC | Age: 57
End: 2022-11-04
Payer: MEDICAID

## 2022-11-04 NOTE — PROGRESS NOTES
2nd attempt (returned TCM Box messge)-C3 nurse attempted to contact Hafsa Temitope Chandan  for a TCC post hospital discharge follow up call. No answer. Left message with callback information for Pool. The patient has a scheduled HOSFU appointment with Cristobal Ann MD  on 11/15/2022 @ [facility].

## 2022-11-04 NOTE — PROGRESS NOTES
C3 nurse attempted to contact Hafsa Hawley  for a TCC post hospital discharge follow up call. No answer. Left message with callback information with patient's sister. The patient has a scheduled HOSFU appointment with Cristobal Ann MD  on 11/15/2022 @ [facility].

## 2022-11-07 ENCOUNTER — TELEPHONE (OUTPATIENT)
Dept: FAMILY MEDICINE | Facility: CLINIC | Age: 57
End: 2022-11-07
Payer: MEDICAID

## 2022-11-07 NOTE — PROGRESS NOTES
3rd attempt (returned TCM Box messge)-C3 nurse attempted to contact Hafsa Temitope Chandan  for a TCC post hospital discharge follow up call. No answer. Left message with callback information for Pool. The patient has a scheduled HOSFU appointment with Cristobal Ann MD  on 11/15/2022 @ [facility].

## 2022-11-07 NOTE — TELEPHONE ENCOUNTER
----- Message from Cristobal Ann MD sent at 11/4/2022  4:11 PM CDT -----  Regarding: FW: Hosptial follow up  Please schedule.   ----- Message -----  From: Peri Boswell RN  Sent: 11/3/2022   4:07 PM CDT  To: Cristobal Ann MD  Subject: Hosptial follow up                               Good afternoon Dr. Ann.    This patient is in need of a hospital follow up. Your schedule is on hold and I do not have access to override. I did try to get the clinic to make appointment, but the earliest they can get is 11/15/2022. However Dr. Tony states patient has to be seen next week. I could not get an early appointment with Cardiology either. They will have to call patient with appointment as Dr. Cortez schedule is booked until December. Could you or your nurse add this patient to be seen next week?    Thanks,    Peri Boswell RN, BSN  [facility]   Case Management/Utilization Review  [phone] (Phone)  [phone] (Fax)

## 2022-11-07 NOTE — PHYSICIAN QUERY
PT Name: Hafsa Hawley  MR #: [id number]     DOCUMENTATION CLARIFICATION     CDS/: Sabrina Olmedo RN, CCDS              Contact information: [email or web address]  This form is a permanent document in the medical record.     Query Date: November 7, 2022    By submitting this query, we are merely seeking further clarification of documentation.  Please utilize your independent clinical judgment when addressing the question(s) below.    The Medical Record contains the following   Indicators Supporting Clinical Findings Location in Medical Record   X Heart Failure documented Chronic combined systolic and diastolic heart failure        admitted with acute on chronic Combined systolic/diastolic heart failure EF 25%    Acute on chronic diastolic HF; appears euvolemic this AM 10/28 h/p, 10/28-11/3 prog notes  11/3 d/c summary    11/1-11/3 prog notes, 11/3 d/c summary    11/1 -11/3 cards notes   X BNP 2771 10/28 lab   X EF/Echo (EF 25% 3/2021),  10/28 h/p   X Radiology findings Opacification of left lower chest suggests atelectasis versus consolidated pneumonia with possible effusion 10/28 cxr   X Subjective/Objective Respiratory Conditions presents with shortness of breath  10/28 ed note    Recent/Current MI      Heart Transplant, LVAD      Edema, JVD      Ascites     X Diuretics/Meds Lasix 80 daily at home   Will start with 40mg IV BID here     10/31 Lasix held this am as her creat has bumped. 10/28 h/p      10/31 prog note    Other Treatment     X Other Admitted for supervised IV diuresis on telemetry.  10/30 prog note     Heart failure is a clinical diagnosis which includes symptomatic fluid retention, elevated intracardiac pressures, and/or the inability of the heart to deliver adequate blood flow.    Heart Failure with reduced Ejection Fraction (HFrEF) or Systolic Heart Failure (loses ability to contract normally, EF is <40%)    Heart Failure with preserved Ejection Fraction (HFpEF) or Diastolic Heart Failure  (stiff ventricles, does not relax properly, EF is >50%)     Heart Failure with Combined Systolic and Diastolic Failure (stiff ventricles, does not relax properly and EF is <50%)    Mid-range or mildly reduced ejection fraction (HFmrEF) is classified as systolic heart failure.  Congestive heart failure with a recovered EF is classified as Diastolic Heart Failure.  Common clues to acute exacerbation:  Rapidly progressive symptoms (w/in 2 weeks of presentation), using IV diuretics, using supplemental O2, pulmonary edema on Xray, new or worsening pleural effusion, +JVD or other signs of volume overload, MI w/in 4 weeks, and/or BNP >500  The clinical guidelines noted are only system guidelines, and do not replace the providers clinical judgment.    Provider, please clarify the type and acuity of CHF.    [   ]  Acute on Chronic Diastolic Heart Failure (HFpEF) - worsening of CHF signs/symptoms in preexisting CHF   [ x  ]  Acute on Chronic Combined Systolic and Diastolic Heart Failure - worsening of CHF signs/symptoms in preexisting CHF   [   ]  Chronic Combined Systolic and Diastolic Heart Failure - pre-existing and stable   [   ]  Other (please specify): ___________________________________   [  ]  Clinically Undetermined       Please document in your progress notes daily for the duration of treatment until resolved and include in your discharge summary.    References:  American Heart Association editorial staff. (2017, May). Ejection Fraction Heart Failure Measurement. American Heart Association. https://www.heart.org/en/health-topics/heart-failure/diagnosing-heart-failure/ejection-fraction-heart-failure-measurement#:~:text=Ejection%20fraction%20(EF)%20is%20a,pushed%20out%20with%20each%20heartbeat  HECTOR Mahmood (2020, December 15). Heart failure with preserved ejection fraction: Clinical manifestations and diagnosis. UpToDate.  https://www.[employer]/contents/heart-failure-with-preserved-ejection-fraction-clinical-manifestations-and-diagnosis.  ICD-10-CM/PCS Coding Clinic Third Quarter ICD-10, Effective with discharges: September 8, 2020 [facility] § Heart failure with mid-range or mildly reduced ejection fraction (2020).  ICD-10-CM/PCS Coding Clinic Third Quarter ICD-10, Effective with discharges: September 8, 2020 [facility] § Heart failure with recovered ejection fraction (2020).  Form No. 01599

## 2022-11-07 NOTE — TELEPHONE ENCOUNTER
"Please see the attached refill request. Medication was discontinued on 11/3/2022 with instructions "stop taking at discharge." Please fill if appropriate.   "

## 2022-11-08 RX ORDER — SACUBITRIL AND VALSARTAN 24; 26 MG/1; MG/1
1 TABLET, FILM COATED ORAL 2 TIMES DAILY
Qty: 90 TABLET | Refills: 1 | OUTPATIENT
Start: 2022-11-08

## 2022-11-08 NOTE — PROGRESS NOTES
Patient has not had the chance to check her e-mail yet; however, was in touch with someone from [employer]. Patient is waiting on a call back from her insurance about it. Patient explained she has referrals for PT and OT and wanted ED navigator to check on it, and see if it could be scheduled. Patient would like her appointment notes edited so the  Could see she is wanting to schedule PT and OT. Patient has no other needs during this time.    ED navigator inquired if patient receive the resources via e-mail. ED navigator explained to patient she will need a new referral for PT and OT, and included that in her appointment notes for next week. ED navigator ensured patient had no other needs at this time. ED navigator will follow-up with patient on/around 11/22/2022.    Beulah Figueroa  ED Navigator- [place]  [phone]

## 2022-11-09 ENCOUNTER — TELEPHONE (OUTPATIENT)
Dept: FAMILY MEDICINE | Facility: CLINIC | Age: 57
End: 2022-11-09
Payer: MEDICAID

## 2022-11-09 NOTE — TELEPHONE ENCOUNTER
Pt has follow up on 11/15 would you like to wait to discuss this further in person or will you be willing to prescribe this?

## 2022-11-09 NOTE — TELEPHONE ENCOUNTER
----- Message from Frank Cook sent at 2022  2:15 PM CST -----  Contact: Patient  Hafsa Hawley  MRN: [id number]  : 1965  PCP: Cristobal Ann  Home Phone      [phone]  Work Phone      Not on file.  Mobile          [phone]  Home Phone      [phone]      MESSAGE: was getting Rx for Transderm patch for nausea & vomiting, that was placed behind ear from Dr Cortez - instructed her to see if PCP would prescribe for her -- will Dr Ann prescribe -- uses [facility] in [place]    Call [phone]    PCP: Seth

## 2022-11-10 RX ORDER — SCOLOPAMINE TRANSDERMAL SYSTEM 1 MG/1
PATCH, EXTENDED RELEASE TRANSDERMAL
Qty: 24 PATCH | Refills: 0 | Status: ON HOLD | OUTPATIENT
Start: 2022-11-10 | End: 2022-11-22 | Stop reason: HOSPADM

## 2022-11-10 NOTE — TELEPHONE ENCOUNTER
Spoke with patient and informed her about appointment already scheduled for 11/15/2022. Patient states she is aware of this appointment and plans to come.

## 2022-11-11 ENCOUNTER — TELEPHONE (OUTPATIENT)
Dept: FAMILY MEDICINE | Facility: CLINIC | Age: 57
End: 2022-11-11
Payer: MEDICAID

## 2022-11-11 NOTE — TELEPHONE ENCOUNTER
----- Message from Lourdes Oquendo sent at 2022 10:59 AM CST -----  Contact: self  Hafsa Hawley  MRN: [id number]  : 1965  PCP: Cristobal Ann  Home Phone      [phone]  Work Phone      Not on file.  Mobile          [phone]  Home Phone      [phone]      MESSAGE:   Pt called to check on status of refill and asked about future appts. Pt also states she isn't feeling any better. Still no energy and weak. Making lunch and/or showering takes a lot of energy out of her.    Phone:  [phone]

## 2022-11-11 NOTE — TELEPHONE ENCOUNTER
Unfortunately, medicaid does not cover home health. I wanted to check esophagram in the past, but she is allergic to dye. Did she ever see GI from the referral I placed in March 2022?

## 2022-11-11 NOTE — TELEPHONE ENCOUNTER
PT has appt this coming Tuesday 11/15 and states she has not been eating, no energy, and very weak. States only able to eat jello and fruits and that still gets stuck in what feels like her chest. She is still experiencing the SOB like before. Did suggest to try going completley liquid at least until her appt while maintaining to drink plenty of fluid as she has been experiencing diarrhea as well which we don't want her to dehydrate as she may wind up back at the hospital. Pt would like to know what other things can she do or have done. Do you think it'd be possible to have home health for pt if possible? Also needing new referral for pt and ot as they keep telling they are full

## 2022-11-15 ENCOUNTER — HOSPITAL ENCOUNTER (INPATIENT)
Facility: HOSPITAL | Age: 57
LOS: 7 days | Discharge: HOME | DRG: 291 | End: 2022-11-22
Attending: EMERGENCY MEDICINE | Admitting: EMERGENCY MEDICINE
Payer: MEDICAID

## 2022-11-15 ENCOUNTER — OFFICE VISIT (OUTPATIENT)
Dept: FAMILY MEDICINE | Facility: CLINIC | Age: 57
End: 2022-11-15
Payer: MEDICAID

## 2022-11-15 VITALS
SYSTOLIC BLOOD PRESSURE: 124 MMHG | HEIGHT: 66 IN | WEIGHT: 227 LBS | HEART RATE: 101 BPM | DIASTOLIC BLOOD PRESSURE: 70 MMHG | BODY MASS INDEX: 36.48 KG/M2 | OXYGEN SATURATION: 93 % | RESPIRATION RATE: 28 BRPM

## 2022-11-15 DIAGNOSIS — R07.9 CHEST PAIN: ICD-10-CM

## 2022-11-15 DIAGNOSIS — J96.11 CHRONIC RESPIRATORY FAILURE WITH HYPOXIA AND HYPERCAPNIA: ICD-10-CM

## 2022-11-15 DIAGNOSIS — I50.43 ACUTE ON CHRONIC COMBINED SYSTOLIC AND DIASTOLIC HEART FAILURE: Primary | ICD-10-CM

## 2022-11-15 DIAGNOSIS — N18.32 STAGE 3B CHRONIC KIDNEY DISEASE: ICD-10-CM

## 2022-11-15 DIAGNOSIS — I48.91 AF (ATRIAL FIBRILLATION): ICD-10-CM

## 2022-11-15 DIAGNOSIS — Z95.810 ICD (IMPLANTABLE CARDIOVERTER-DEFIBRILLATOR) IN PLACE: Chronic | ICD-10-CM

## 2022-11-15 DIAGNOSIS — R07.9 CHEST PAIN, UNSPECIFIED TYPE: ICD-10-CM

## 2022-11-15 DIAGNOSIS — I50.42 CHRONIC COMBINED SYSTOLIC AND DIASTOLIC HEART FAILURE: ICD-10-CM

## 2022-11-15 DIAGNOSIS — I50.9 ACUTE ON CHRONIC CONGESTIVE HEART FAILURE, UNSPECIFIED HEART FAILURE TYPE: ICD-10-CM

## 2022-11-15 DIAGNOSIS — N17.9 AKI (ACUTE KIDNEY INJURY): ICD-10-CM

## 2022-11-15 DIAGNOSIS — J96.12 CHRONIC RESPIRATORY FAILURE WITH HYPOXIA AND HYPERCAPNIA: ICD-10-CM

## 2022-11-15 DIAGNOSIS — Z09 HOSPITAL DISCHARGE FOLLOW-UP: Primary | ICD-10-CM

## 2022-11-15 DIAGNOSIS — J96.21 ACUTE ON CHRONIC RESPIRATORY FAILURE WITH HYPOXIA: ICD-10-CM

## 2022-11-15 DIAGNOSIS — I50.9 HEART FAILURE: ICD-10-CM

## 2022-11-15 DIAGNOSIS — I50.9 HEART FAILURE, UNSPECIFIED HF CHRONICITY, UNSPECIFIED HEART FAILURE TYPE: ICD-10-CM

## 2022-11-15 DIAGNOSIS — R06.02 SHORTNESS OF BREATH: ICD-10-CM

## 2022-11-15 LAB
ALBUMIN SERPL BCP-MCNC: 3.5 G/DL (ref 3.5–5.2)
ALP SERPL-CCNC: 52 U/L (ref 55–135)
ALT SERPL W/O P-5'-P-CCNC: 42 U/L (ref 10–44)
ANION GAP SERPL CALC-SCNC: 10 MMOL/L (ref 8–16)
ANISOCYTOSIS BLD QL SMEAR: SLIGHT
AST SERPL-CCNC: 23 U/L (ref 10–40)
BASOPHILS # BLD AUTO: 0.04 K/UL (ref 0–0.2)
BASOPHILS NFR BLD: 0.5 % (ref 0–1.9)
BILIRUB SERPL-MCNC: 1.6 MG/DL (ref 0.1–1)
BNP SERPL-MCNC: 4104 PG/ML (ref 0–99)
BUN SERPL-MCNC: 40 MG/DL (ref 6–20)
CALCIUM SERPL-MCNC: 9 MG/DL (ref 8.7–10.5)
CHLORIDE SERPL-SCNC: 108 MMOL/L (ref 95–110)
CO2 SERPL-SCNC: 23 MMOL/L (ref 23–29)
CREAT SERPL-MCNC: 2.4 MG/DL (ref 0.5–1.4)
DACRYOCYTES BLD QL SMEAR: ABNORMAL
DIFFERENTIAL METHOD: ABNORMAL
EOSINOPHIL # BLD AUTO: 0.1 K/UL (ref 0–0.5)
EOSINOPHIL NFR BLD: 1.4 % (ref 0–8)
ERYTHROCYTE [DISTWIDTH] IN BLOOD BY AUTOMATED COUNT: 27.9 % (ref 11.5–14.5)
EST. GFR  (NO RACE VARIABLE): 23 ML/MIN/1.73 M^2
GLUCOSE SERPL-MCNC: 159 MG/DL (ref 70–110)
HCT VFR BLD AUTO: 27.9 % (ref 37–48.5)
HCV AB SERPL QL IA: NORMAL
HGB BLD-MCNC: 8.1 G/DL (ref 12–16)
HIV 1+2 AB+HIV1 P24 AG SERPL QL IA: NORMAL
HYPOCHROMIA BLD QL SMEAR: ABNORMAL
IMM GRANULOCYTES # BLD AUTO: 0.13 K/UL (ref 0–0.04)
IMM GRANULOCYTES NFR BLD AUTO: 1.5 % (ref 0–0.5)
LYMPHOCYTES # BLD AUTO: 1.3 K/UL (ref 1–4.8)
LYMPHOCYTES NFR BLD: 14.3 % (ref 18–48)
MCH RBC QN AUTO: 17.6 PG (ref 27–31)
MCHC RBC AUTO-ENTMCNC: 29 G/DL (ref 32–36)
MCV RBC AUTO: 61 FL (ref 82–98)
MONOCYTES # BLD AUTO: 0.7 K/UL (ref 0.3–1)
MONOCYTES NFR BLD: 8.2 % (ref 4–15)
NEUTROPHILS # BLD AUTO: 6.6 K/UL (ref 1.8–7.7)
NEUTROPHILS NFR BLD: 74.1 % (ref 38–73)
NRBC BLD-RTO: 7 /100 WBC
OVALOCYTES BLD QL SMEAR: ABNORMAL
PLATELET # BLD AUTO: 234 K/UL (ref 150–450)
PLATELET BLD QL SMEAR: ABNORMAL
PMV BLD AUTO: ABNORMAL FL (ref 9.2–12.9)
POIKILOCYTOSIS BLD QL SMEAR: SLIGHT
POLYCHROMASIA BLD QL SMEAR: ABNORMAL
POTASSIUM SERPL-SCNC: 4.3 MMOL/L (ref 3.5–5.1)
PROT SERPL-MCNC: 6.2 G/DL (ref 6–8.4)
RBC # BLD AUTO: 4.61 M/UL (ref 4–5.4)
SCHISTOCYTES BLD QL SMEAR: ABNORMAL
SCHISTOCYTES BLD QL SMEAR: PRESENT
SODIUM SERPL-SCNC: 141 MMOL/L (ref 136–145)
SPHEROCYTES BLD QL SMEAR: ABNORMAL
STOMATOCYTES BLD QL SMEAR: PRESENT
TARGETS BLD QL SMEAR: ABNORMAL
TROPONIN I SERPL DL<=0.01 NG/ML-MCNC: 0.77 NG/ML (ref 0–0.03)
WBC # BLD AUTO: 8.83 K/UL (ref 3.9–12.7)

## 2022-11-15 PROCEDURE — 83880 ASSAY OF NATRIURETIC PEPTIDE: CPT | Performed by: PHYSICIAN ASSISTANT

## 2022-11-15 PROCEDURE — 99999 PR PBB SHADOW E&M-EST. PATIENT-LVL V: CPT | Mod: PBBFAC,,, | Performed by: STUDENT IN AN ORGANIZED HEALTH CARE EDUCATION/TRAINING PROGRAM

## 2022-11-15 PROCEDURE — 87389 HIV-1 AG W/HIV-1&-2 AB AG IA: CPT | Performed by: PHYSICIAN ASSISTANT

## 2022-11-15 PROCEDURE — 99285 PR EMERGENCY DEPT VISIT,LEVEL V: ICD-10-PCS | Mod: ,,, | Performed by: EMERGENCY MEDICINE

## 2022-11-15 PROCEDURE — 12000002 HC ACUTE/MED SURGE SEMI-PRIVATE ROOM

## 2022-11-15 PROCEDURE — 94660 CPAP INITIATION&MGMT: CPT

## 2022-11-15 PROCEDURE — 93005 ELECTROCARDIOGRAM TRACING: CPT

## 2022-11-15 PROCEDURE — 94761 N-INVAS EAR/PLS OXIMETRY MLT: CPT

## 2022-11-15 PROCEDURE — 99900035 HC TECH TIME PER 15 MIN (STAT)

## 2022-11-15 PROCEDURE — 99215 OFFICE O/P EST HI 40 MIN: CPT | Mod: PBBFAC,25,27 | Performed by: STUDENT IN AN ORGANIZED HEALTH CARE EDUCATION/TRAINING PROGRAM

## 2022-11-15 PROCEDURE — 80053 COMPREHEN METABOLIC PANEL: CPT | Performed by: PHYSICIAN ASSISTANT

## 2022-11-15 PROCEDURE — 25000242 PHARM REV CODE 250 ALT 637 W/ HCPCS: Performed by: STUDENT IN AN ORGANIZED HEALTH CARE EDUCATION/TRAINING PROGRAM

## 2022-11-15 PROCEDURE — 94640 AIRWAY INHALATION TREATMENT: CPT

## 2022-11-15 PROCEDURE — 99495 TRANSJ CARE MGMT MOD F2F 14D: CPT | Mod: S$PBB,,, | Performed by: STUDENT IN AN ORGANIZED HEALTH CARE EDUCATION/TRAINING PROGRAM

## 2022-11-15 PROCEDURE — 85025 COMPLETE CBC W/AUTO DIFF WBC: CPT | Performed by: PHYSICIAN ASSISTANT

## 2022-11-15 PROCEDURE — 99223 PR INITIAL HOSPITAL CARE,LEVL III: ICD-10-PCS | Mod: ,,, | Performed by: STUDENT IN AN ORGANIZED HEALTH CARE EDUCATION/TRAINING PROGRAM

## 2022-11-15 PROCEDURE — 94799 UNLISTED PULMONARY SVC/PX: CPT

## 2022-11-15 PROCEDURE — 99223 1ST HOSP IP/OBS HIGH 75: CPT | Mod: ,,, | Performed by: STUDENT IN AN ORGANIZED HEALTH CARE EDUCATION/TRAINING PROGRAM

## 2022-11-15 PROCEDURE — 86803 HEPATITIS C AB TEST: CPT | Performed by: PHYSICIAN ASSISTANT

## 2022-11-15 PROCEDURE — 27000190 HC CPAP FULL FACE MASK W/VALVE

## 2022-11-15 PROCEDURE — 84484 ASSAY OF TROPONIN QUANT: CPT | Performed by: PHYSICIAN ASSISTANT

## 2022-11-15 PROCEDURE — 93010 ELECTROCARDIOGRAM REPORT: CPT | Mod: ,,, | Performed by: INTERNAL MEDICINE

## 2022-11-15 PROCEDURE — 96374 THER/PROPH/DIAG INJ IV PUSH: CPT

## 2022-11-15 PROCEDURE — 99285 EMERGENCY DEPT VISIT HI MDM: CPT | Mod: 25

## 2022-11-15 PROCEDURE — 99999 PR PBB SHADOW E&M-EST. PATIENT-LVL V: ICD-10-PCS | Mod: PBBFAC,,, | Performed by: STUDENT IN AN ORGANIZED HEALTH CARE EDUCATION/TRAINING PROGRAM

## 2022-11-15 PROCEDURE — 27000221 HC OXYGEN, UP TO 24 HOURS

## 2022-11-15 PROCEDURE — 63600175 PHARM REV CODE 636 W HCPCS: Performed by: EMERGENCY MEDICINE

## 2022-11-15 PROCEDURE — 99495 TCM SERVICES (MODERATE COMPLEXITY): ICD-10-PCS | Mod: S$PBB,,, | Performed by: STUDENT IN AN ORGANIZED HEALTH CARE EDUCATION/TRAINING PROGRAM

## 2022-11-15 PROCEDURE — 99285 EMERGENCY DEPT VISIT HI MDM: CPT | Mod: ,,, | Performed by: EMERGENCY MEDICINE

## 2022-11-15 PROCEDURE — 93010 EKG 12-LEAD: ICD-10-PCS | Mod: ,,, | Performed by: INTERNAL MEDICINE

## 2022-11-15 RX ORDER — POLYETHYLENE GLYCOL 3350 17 G/17G
17 POWDER, FOR SOLUTION ORAL DAILY
Status: DISCONTINUED | OUTPATIENT
Start: 2022-11-16 | End: 2022-11-22 | Stop reason: HOSPADM

## 2022-11-15 RX ORDER — METOPROLOL SUCCINATE 100 MG/1
100 TABLET, EXTENDED RELEASE ORAL DAILY
Status: DISCONTINUED | OUTPATIENT
Start: 2022-11-16 | End: 2022-11-22 | Stop reason: HOSPADM

## 2022-11-15 RX ORDER — TALC
6 POWDER (GRAM) TOPICAL NIGHTLY PRN
Status: DISCONTINUED | OUTPATIENT
Start: 2022-11-15 | End: 2022-11-15

## 2022-11-15 RX ORDER — PROCHLORPERAZINE EDISYLATE 5 MG/ML
2.5 INJECTION INTRAMUSCULAR; INTRAVENOUS EVERY 6 HOURS PRN
Status: DISCONTINUED | OUTPATIENT
Start: 2022-11-15 | End: 2022-11-22 | Stop reason: HOSPADM

## 2022-11-15 RX ORDER — MONTELUKAST SODIUM 10 MG/1
10 TABLET ORAL DAILY
Status: DISCONTINUED | OUTPATIENT
Start: 2022-11-16 | End: 2022-11-22 | Stop reason: HOSPADM

## 2022-11-15 RX ORDER — ONDANSETRON 2 MG/ML
4 INJECTION INTRAMUSCULAR; INTRAVENOUS EVERY 8 HOURS PRN
Status: DISCONTINUED | OUTPATIENT
Start: 2022-11-15 | End: 2022-11-15

## 2022-11-15 RX ORDER — INSULIN ASPART 100 [IU]/ML
0-5 INJECTION, SOLUTION INTRAVENOUS; SUBCUTANEOUS
Status: DISCONTINUED | OUTPATIENT
Start: 2022-11-15 | End: 2022-11-22 | Stop reason: HOSPADM

## 2022-11-15 RX ORDER — FUROSEMIDE 10 MG/ML
80 INJECTION INTRAMUSCULAR; INTRAVENOUS DAILY
Status: DISCONTINUED | OUTPATIENT
Start: 2022-11-16 | End: 2022-11-16

## 2022-11-15 RX ORDER — NALOXONE HCL 0.4 MG/ML
0.02 VIAL (ML) INJECTION
Status: DISCONTINUED | OUTPATIENT
Start: 2022-11-15 | End: 2022-11-22 | Stop reason: HOSPADM

## 2022-11-15 RX ORDER — ALLOPURINOL 100 MG/1
100 TABLET ORAL DAILY
Status: DISCONTINUED | OUTPATIENT
Start: 2022-11-16 | End: 2022-11-22 | Stop reason: HOSPADM

## 2022-11-15 RX ORDER — SODIUM CHLORIDE 0.9 % (FLUSH) 0.9 %
10 SYRINGE (ML) INJECTION
Status: DISCONTINUED | OUTPATIENT
Start: 2022-11-15 | End: 2022-11-22 | Stop reason: HOSPADM

## 2022-11-15 RX ORDER — FLUTICASONE FUROATE AND VILANTEROL 200; 25 UG/1; UG/1
1 POWDER RESPIRATORY (INHALATION) DAILY
Status: DISCONTINUED | OUTPATIENT
Start: 2022-11-16 | End: 2022-11-22 | Stop reason: HOSPADM

## 2022-11-15 RX ORDER — PANTOPRAZOLE SODIUM 40 MG/1
40 TABLET, DELAYED RELEASE ORAL DAILY
Status: DISCONTINUED | OUTPATIENT
Start: 2022-11-16 | End: 2022-11-22 | Stop reason: HOSPADM

## 2022-11-15 RX ORDER — ALPRAZOLAM 1 MG/1
1 TABLET ORAL 2 TIMES DAILY PRN
Status: DISCONTINUED | OUTPATIENT
Start: 2022-11-15 | End: 2022-11-22 | Stop reason: HOSPADM

## 2022-11-15 RX ORDER — FUROSEMIDE 10 MG/ML
80 INJECTION INTRAMUSCULAR; INTRAVENOUS
Status: COMPLETED | OUTPATIENT
Start: 2022-11-15 | End: 2022-11-15

## 2022-11-15 RX ORDER — IBUPROFEN 200 MG
16 TABLET ORAL
Status: DISCONTINUED | OUTPATIENT
Start: 2022-11-15 | End: 2022-11-22 | Stop reason: HOSPADM

## 2022-11-15 RX ORDER — GLUCAGON 1 MG
1 KIT INJECTION
Status: DISCONTINUED | OUTPATIENT
Start: 2022-11-15 | End: 2022-11-22 | Stop reason: HOSPADM

## 2022-11-15 RX ORDER — SODIUM CHLORIDE 0.9 % (FLUSH) 0.9 %
10 SYRINGE (ML) INJECTION EVERY 12 HOURS PRN
Status: DISCONTINUED | OUTPATIENT
Start: 2022-11-15 | End: 2022-11-22 | Stop reason: HOSPADM

## 2022-11-15 RX ORDER — IBUPROFEN 200 MG
24 TABLET ORAL
Status: DISCONTINUED | OUTPATIENT
Start: 2022-11-15 | End: 2022-11-22 | Stop reason: HOSPADM

## 2022-11-15 RX ORDER — ATORVASTATIN CALCIUM 40 MG/1
40 TABLET, FILM COATED ORAL NIGHTLY
Status: DISCONTINUED | OUTPATIENT
Start: 2022-11-15 | End: 2022-11-22 | Stop reason: HOSPADM

## 2022-11-15 RX ORDER — IPRATROPIUM BROMIDE AND ALBUTEROL SULFATE 2.5; .5 MG/3ML; MG/3ML
3 SOLUTION RESPIRATORY (INHALATION) 2 TIMES DAILY
Status: DISCONTINUED | OUTPATIENT
Start: 2022-11-15 | End: 2022-11-22 | Stop reason: HOSPADM

## 2022-11-15 RX ORDER — AMIODARONE HYDROCHLORIDE 200 MG/1
200 TABLET ORAL DAILY
Status: DISCONTINUED | OUTPATIENT
Start: 2022-11-16 | End: 2022-11-22 | Stop reason: HOSPADM

## 2022-11-15 RX ORDER — TALC
6 POWDER (GRAM) TOPICAL NIGHTLY PRN
Status: DISCONTINUED | OUTPATIENT
Start: 2022-11-15 | End: 2022-11-22 | Stop reason: HOSPADM

## 2022-11-15 RX ORDER — LANOLIN ALCOHOL/MO/W.PET/CERES
1 CREAM (GRAM) TOPICAL DAILY
Status: DISCONTINUED | OUTPATIENT
Start: 2022-11-16 | End: 2022-11-22 | Stop reason: HOSPADM

## 2022-11-15 RX ORDER — FLUTICASONE PROPIONATE 50 MCG
2 SPRAY, SUSPENSION (ML) NASAL DAILY
Status: DISCONTINUED | OUTPATIENT
Start: 2022-11-16 | End: 2022-11-22 | Stop reason: HOSPADM

## 2022-11-15 RX ADMIN — FUROSEMIDE 80 MG: 10 INJECTION, SOLUTION INTRAMUSCULAR; INTRAVENOUS at 06:11

## 2022-11-15 RX ADMIN — IPRATROPIUM BROMIDE AND ALBUTEROL SULFATE 3 ML: 2.5; .5 SOLUTION RESPIRATORY (INHALATION) at 10:11

## 2022-11-15 NOTE — FIRST PROVIDER EVALUATION
" Emergency Department TeleTriage Encounter Note      CHIEF COMPLAINT    Chief Complaint   Patient presents with    Chest Pain    Shortness of Breath     Sent from clinic has aicd       VITAL SIGNS   Initial Vitals [11/15/22 1636]   BP Pulse Resp Temp SpO2   (!) 173/100 (!) 111 (!) 24 98.1 °F (36.7 °C) 97 %      MAP       --            ALLERGIES    Review of patient's allergies indicates:   Allergen Reactions    Penicillins Hives    Iodinated contrast media Nausea And Vomiting    Oxycodone-acetaminophen Other (See Comments)     Nausea, Dizziness, Anxiety.  "I don't like how it makes me feel."   Given Hydromorphone 0.5mg IVP  Without problems.  Other reaction(s): Other (See Comments)    Diovan hct [valsartan-hydrochlorothiazide] Other (See Comments)     Causes coughing    Irinotecan      Pt has homozygosity for the TA7 promoter variant that places this individual at significantly increased risk for   severe neutropenia (grade 4) when treated with the standard dose of irinotecan (risk approximately 50%).   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 Metabolism of other drugs not listed here may also be impacted by UGT1A1 enzyme activity.       Tramadol Nausea And Vomiting     Other reaction(s): Other (See Comments)       PROVIDER TRIAGE NOTE  56 y/o with AICD, history of CHF, on home oxygen x 3 years,  listed for heart and kidney transplant.    Patient presenting with chest pain and SOB x 2-3 days,  pain radiates from from generalized chest, to shoulder and neck.    She has generalized weakness.    She has pain radiating into stomach with coughing, no current abdominal pain.  She has productive cough.    Patient 97% on home oxygen, pulse 111, will order CHF workup pending ED provider evaluation.     Initial orders will be placed and care will be transferred to an alternate provider when patient is " roomed for a full evaluation. Any additional orders and the final disposition will be determined by that provider.         ORDERS  Labs Reviewed   HIV 1 / 2 ANTIBODY   HEPATITIS C ANTIBODY       ED Orders (720h ago, onward)      Start Ordered     Status Ordering Provider    11/15/22 1638 11/15/22 1638  HIV 1/2 Ag/Ab (4th Gen)  STAT         Ordered ANTHONY CASTELLANOS    11/15/22 1638 11/15/22 1638  Hepatitis C Antibody  STAT         Ordered ANTHONY CASTELLANOS    11/15/22 1638 11/15/22 1638  EKG 12-lead  Once         Ordered ANTHONY CASTELLANOS              Virtual Visit Note: The provider triage portion of this emergency department evaluation and documentation was performed via [employer], a HIPAA-compliant telemedicine application, in concert with a tele-presenter in the room. A face to face patient evaluation with one of my colleagues will occur once the patient is placed in an emergency department room.      DISCLAIMER: This note was prepared with [employer] voice recognition transcription software. Garbled syntax, mangled pronouns, and other bizarre constructions may be attributed to that software system.

## 2022-11-15 NOTE — PROVIDER PROGRESS NOTES - EMERGENCY DEPT.
Encounter Date: 11/15/2022    ED Physician Progress Notes         EKG - STEMI Decision  Initial Reading: No STEMI present.

## 2022-11-15 NOTE — PROGRESS NOTES
Subjective:       Patient ID: Hafsa Hawley is a 57 y.o. female.    Chief Complaint: Hospital Follow Up (Pt here with concerns of not being able to eat. States not able to keep anything down. Experiencing chest pain on left side into chest and down her arm. Unable to daily tasks without being out of energy)    Pt here for hospital follow-up. She was recently admitted 10/28/22-11/3/22 for HFpEF exacerbation. Pt states she felt only minimally better since her discharge. She has been having chest pain and pressure. She has been feeling SOB as well, she is on home oxygen 2L. Oxygen sat has been in the 90s. She uses the nebulizer at home as well with little improvement. Pt states she also has pain all over. She has been feeling very weak. She lives with her ex  and granddaughter. She is doing her ADLs at this point. She has chronic nausea, unchanged. She has poor appetite. She has been taking 40mg lasix once daily. She has had a second dose on 2 days since discharge for worsening edema. She reports she has not used the CPAP at home due to coughing. She has been sleeping with multiple pillows.    Transitional Care Note    Family and/or Caretaker present at visit?  No.  Diagnostic tests reviewed/disposition: No diagnosic tests pending after this hospitalization.  Disease/illness education: discussed her end-stage heart disease  Home health/community services discussion/referrals: Patient does not have home health established from hospital visit.  They do not want need home health.  If needed, we will set up home health for the patient.   Establishment or re-establishment of referral orders for community resources: No other necessary community resources.   Discussion with other health care providers: No discussion with other health care providers necessary.         Review of Systems   Constitutional:  Positive for activity change, appetite change, chills and fatigue. Negative for fever.   HENT:  Negative for  congestion and sore throat.    Respiratory:  Positive for cough and shortness of breath.    Cardiovascular:  Positive for chest pain and leg swelling.   Gastrointestinal:  Positive for nausea. Negative for abdominal pain, diarrhea and vomiting.   Genitourinary:  Positive for frequency and urgency. Negative for dysuria and hematuria.   Musculoskeletal:  Positive for arthralgias and myalgias.   Neurological:  Positive for dizziness and light-headedness. Negative for syncope.   Psychiatric/Behavioral:  Positive for dysphoric mood. The patient is nervous/anxious.      Objective:      Physical Exam   Constitutional:   tearful   HENT:   Head: Normocephalic and atraumatic.   Cardiovascular: Normal heart sounds.   No murmur heard.  Pulmonary/Chest: Effort normal and breath sounds normal. No respiratory distress.   Neurological: She is alert.   Psychiatric:   Depressed mood and affect   Vitals reviewed.    Assessment:       1. Hospital discharge follow-up    2. Stage 3b chronic kidney disease    3. Chronic combined systolic and diastolic heart failure    4. Chronic respiratory failure with hypoxia and hypercapnia    5. Chest pain, unspecified type        Plan:           1. Hospital discharge follow-up    2. Stage 3b chronic kidney disease  -     Comprehensive Metabolic Panel; Future; Expected date: 11/15/2022    3. Chronic combined systolic and diastolic heart failure  -     Troponin I; Future; Expected date: 11/15/2022  -     BNP; Future; Expected date: 11/15/2022    4. Chronic respiratory failure with hypoxia and hypercapnia    5. Chest pain, unspecified type  -     Troponin I; Future; Expected date: 11/15/2022       Labs as ordered  She has not seen her cardiologist at [facility] since April. I don't see that she has anything scheduled. Cardiology at [facility] added amiodarone during admission.   Follow-up cardiology asap, last visit in April and has been hospitalized since then  Follow-up nephrology asap, referred in  march never seen  Follow-up GI as scheduled, referral placed in march and never seen  RTC 1 month or sooner if needed

## 2022-11-16 PROBLEM — R94.31 PROLONGED Q-T INTERVAL ON ECG: Status: ACTIVE | Noted: 2022-11-16

## 2022-11-16 LAB
ALBUMIN SERPL BCP-MCNC: 3.4 G/DL (ref 3.5–5.2)
ALP SERPL-CCNC: 49 U/L (ref 55–135)
ALT SERPL W/O P-5'-P-CCNC: 34 U/L (ref 10–44)
ANION GAP SERPL CALC-SCNC: 11 MMOL/L (ref 8–16)
ANISOCYTOSIS BLD QL SMEAR: ABNORMAL
AST SERPL-CCNC: 18 U/L (ref 10–40)
BASO STIPL BLD QL SMEAR: ABNORMAL
BASOPHILS # BLD AUTO: 0.04 K/UL (ref 0–0.2)
BASOPHILS NFR BLD: 0.5 % (ref 0–1.9)
BILIRUB SERPL-MCNC: 2 MG/DL (ref 0.1–1)
BUN SERPL-MCNC: 43 MG/DL (ref 6–20)
CALCIUM SERPL-MCNC: 9.2 MG/DL (ref 8.7–10.5)
CHLORIDE SERPL-SCNC: 108 MMOL/L (ref 95–110)
CO2 SERPL-SCNC: 23 MMOL/L (ref 23–29)
CREAT SERPL-MCNC: 2.1 MG/DL (ref 0.5–1.4)
CREAT UR-MCNC: 39 MG/DL (ref 15–325)
DACRYOCYTES BLD QL SMEAR: ABNORMAL
DIFFERENTIAL METHOD: ABNORMAL
EOSINOPHIL # BLD AUTO: 0.2 K/UL (ref 0–0.5)
EOSINOPHIL NFR BLD: 2.3 % (ref 0–8)
ERYTHROCYTE [DISTWIDTH] IN BLOOD BY AUTOMATED COUNT: 28 % (ref 11.5–14.5)
EST. GFR  (NO RACE VARIABLE): 27 ML/MIN/1.73 M^2
ESTIMATED AVG GLUCOSE: 131 MG/DL (ref 68–131)
FERRITIN SERPL-MCNC: 243 NG/ML (ref 20–300)
GLUCOSE SERPL-MCNC: 145 MG/DL (ref 70–110)
HBA1C MFR BLD: 6.2 % (ref 4–5.6)
HCT VFR BLD AUTO: 26.5 % (ref 37–48.5)
HGB BLD-MCNC: 7.8 G/DL (ref 12–16)
HYPOCHROMIA BLD QL SMEAR: ABNORMAL
IMM GRANULOCYTES # BLD AUTO: 0.11 K/UL (ref 0–0.04)
IMM GRANULOCYTES NFR BLD AUTO: 1.4 % (ref 0–0.5)
INR PPP: 1.1 (ref 0.8–1.2)
IRON SERPL-MCNC: 102 UG/DL (ref 30–160)
LYMPHOCYTES # BLD AUTO: 1.5 K/UL (ref 1–4.8)
LYMPHOCYTES NFR BLD: 18.6 % (ref 18–48)
MAGNESIUM SERPL-MCNC: 1.8 MG/DL (ref 1.6–2.6)
MCH RBC QN AUTO: 17.8 PG (ref 27–31)
MCHC RBC AUTO-ENTMCNC: 29.4 G/DL (ref 32–36)
MCV RBC AUTO: 61 FL (ref 82–98)
MONOCYTES # BLD AUTO: 0.7 K/UL (ref 0.3–1)
MONOCYTES NFR BLD: 8.7 % (ref 4–15)
NEUTROPHILS # BLD AUTO: 5.4 K/UL (ref 1.8–7.7)
NEUTROPHILS NFR BLD: 68.5 % (ref 38–73)
NRBC BLD-RTO: 6 /100 WBC
OVALOCYTES BLD QL SMEAR: ABNORMAL
PHOSPHATE SERPL-MCNC: 3.5 MG/DL (ref 2.7–4.5)
PLATELET # BLD AUTO: 203 K/UL (ref 150–450)
PLATELET BLD QL SMEAR: ABNORMAL
PMV BLD AUTO: ABNORMAL FL (ref 9.2–12.9)
POCT GLUCOSE: 181 MG/DL (ref 70–110)
POCT GLUCOSE: 204 MG/DL (ref 70–110)
POIKILOCYTOSIS BLD QL SMEAR: ABNORMAL
POLYCHROMASIA BLD QL SMEAR: ABNORMAL
POTASSIUM SERPL-SCNC: 3.9 MMOL/L (ref 3.5–5.1)
PROCALCITONIN SERPL IA-MCNC: 0.16 NG/ML
PROT SERPL-MCNC: 6 G/DL (ref 6–8.4)
PROTHROMBIN TIME: 11 SEC (ref 9–12.5)
RBC # BLD AUTO: 4.38 M/UL (ref 4–5.4)
SATURATED IRON: 47 % (ref 20–50)
SCHISTOCYTES BLD QL SMEAR: ABNORMAL
SCHISTOCYTES BLD QL SMEAR: PRESENT
SODIUM SERPL-SCNC: 142 MMOL/L (ref 136–145)
SPHEROCYTES BLD QL SMEAR: ABNORMAL
TARGETS BLD QL SMEAR: ABNORMAL
TOTAL IRON BINDING CAPACITY: 215 UG/DL (ref 250–450)
TRANSFERRIN SERPL-MCNC: 145 MG/DL (ref 200–375)
TROPONIN I SERPL DL<=0.01 NG/ML-MCNC: 0.78 NG/ML (ref 0–0.03)
UUN UR-MCNC: 292 MG/DL (ref 140–1050)
WBC # BLD AUTO: 7.83 K/UL (ref 3.9–12.7)

## 2022-11-16 PROCEDURE — 93005 ELECTROCARDIOGRAM TRACING: CPT

## 2022-11-16 PROCEDURE — 63600175 PHARM REV CODE 636 W HCPCS: Performed by: HOSPITALIST

## 2022-11-16 PROCEDURE — 84540 ASSAY OF URINE/UREA-N: CPT | Performed by: STUDENT IN AN ORGANIZED HEALTH CARE EDUCATION/TRAINING PROGRAM

## 2022-11-16 PROCEDURE — 85610 PROTHROMBIN TIME: CPT | Performed by: STUDENT IN AN ORGANIZED HEALTH CARE EDUCATION/TRAINING PROGRAM

## 2022-11-16 PROCEDURE — 84484 ASSAY OF TROPONIN QUANT: CPT | Performed by: STUDENT IN AN ORGANIZED HEALTH CARE EDUCATION/TRAINING PROGRAM

## 2022-11-16 PROCEDURE — 85025 COMPLETE CBC W/AUTO DIFF WBC: CPT | Performed by: STUDENT IN AN ORGANIZED HEALTH CARE EDUCATION/TRAINING PROGRAM

## 2022-11-16 PROCEDURE — 11000001 HC ACUTE MED/SURG PRIVATE ROOM

## 2022-11-16 PROCEDURE — 83735 ASSAY OF MAGNESIUM: CPT | Performed by: STUDENT IN AN ORGANIZED HEALTH CARE EDUCATION/TRAINING PROGRAM

## 2022-11-16 PROCEDURE — 80053 COMPREHEN METABOLIC PANEL: CPT | Performed by: STUDENT IN AN ORGANIZED HEALTH CARE EDUCATION/TRAINING PROGRAM

## 2022-11-16 PROCEDURE — 82570 ASSAY OF URINE CREATININE: CPT | Performed by: STUDENT IN AN ORGANIZED HEALTH CARE EDUCATION/TRAINING PROGRAM

## 2022-11-16 PROCEDURE — 99233 SBSQ HOSP IP/OBS HIGH 50: CPT | Mod: ,,, | Performed by: HOSPITALIST

## 2022-11-16 PROCEDURE — 84100 ASSAY OF PHOSPHORUS: CPT | Performed by: STUDENT IN AN ORGANIZED HEALTH CARE EDUCATION/TRAINING PROGRAM

## 2022-11-16 PROCEDURE — 99233 PR SUBSEQUENT HOSPITAL CARE,LEVL III: ICD-10-PCS | Mod: ,,, | Performed by: HOSPITALIST

## 2022-11-16 PROCEDURE — 93010 ELECTROCARDIOGRAM REPORT: CPT | Mod: ,,, | Performed by: INTERNAL MEDICINE

## 2022-11-16 PROCEDURE — 94640 AIRWAY INHALATION TREATMENT: CPT

## 2022-11-16 PROCEDURE — 93010 EKG 12-LEAD: ICD-10-PCS | Mod: 77,,, | Performed by: INTERNAL MEDICINE

## 2022-11-16 PROCEDURE — 93010 ELECTROCARDIOGRAM REPORT: CPT | Mod: 77,,, | Performed by: INTERNAL MEDICINE

## 2022-11-16 PROCEDURE — 82728 ASSAY OF FERRITIN: CPT | Performed by: STUDENT IN AN ORGANIZED HEALTH CARE EDUCATION/TRAINING PROGRAM

## 2022-11-16 PROCEDURE — 63600175 PHARM REV CODE 636 W HCPCS: Performed by: STUDENT IN AN ORGANIZED HEALTH CARE EDUCATION/TRAINING PROGRAM

## 2022-11-16 PROCEDURE — 83036 HEMOGLOBIN GLYCOSYLATED A1C: CPT | Performed by: STUDENT IN AN ORGANIZED HEALTH CARE EDUCATION/TRAINING PROGRAM

## 2022-11-16 PROCEDURE — 27000221 HC OXYGEN, UP TO 24 HOURS

## 2022-11-16 PROCEDURE — 25000003 PHARM REV CODE 250: Performed by: STUDENT IN AN ORGANIZED HEALTH CARE EDUCATION/TRAINING PROGRAM

## 2022-11-16 PROCEDURE — 25000003 PHARM REV CODE 250: Performed by: HOSPITALIST

## 2022-11-16 PROCEDURE — 25000242 PHARM REV CODE 250 ALT 637 W/ HCPCS: Performed by: HOSPITALIST

## 2022-11-16 PROCEDURE — 94761 N-INVAS EAR/PLS OXIMETRY MLT: CPT

## 2022-11-16 PROCEDURE — 94799 UNLISTED PULMONARY SVC/PX: CPT

## 2022-11-16 PROCEDURE — 36415 COLL VENOUS BLD VENIPUNCTURE: CPT | Performed by: STUDENT IN AN ORGANIZED HEALTH CARE EDUCATION/TRAINING PROGRAM

## 2022-11-16 PROCEDURE — 99900035 HC TECH TIME PER 15 MIN (STAT)

## 2022-11-16 PROCEDURE — 84145 PROCALCITONIN (PCT): CPT | Performed by: STUDENT IN AN ORGANIZED HEALTH CARE EDUCATION/TRAINING PROGRAM

## 2022-11-16 PROCEDURE — 25000242 PHARM REV CODE 250 ALT 637 W/ HCPCS: Performed by: STUDENT IN AN ORGANIZED HEALTH CARE EDUCATION/TRAINING PROGRAM

## 2022-11-16 PROCEDURE — 84466 ASSAY OF TRANSFERRIN: CPT | Performed by: STUDENT IN AN ORGANIZED HEALTH CARE EDUCATION/TRAINING PROGRAM

## 2022-11-16 PROCEDURE — 93010 EKG 12-LEAD: ICD-10-PCS | Mod: ,,, | Performed by: INTERNAL MEDICINE

## 2022-11-16 PROCEDURE — 99222 1ST HOSP IP/OBS MODERATE 55: CPT | Mod: ,,, | Performed by: INTERNAL MEDICINE

## 2022-11-16 PROCEDURE — 99222 PR INITIAL HOSPITAL CARE,LEVL II: ICD-10-PCS | Mod: ,,, | Performed by: INTERNAL MEDICINE

## 2022-11-16 RX ORDER — NITROGLYCERIN 0.4 MG/1
0.4 TABLET SUBLINGUAL EVERY 5 MIN PRN
Status: DISCONTINUED | OUTPATIENT
Start: 2022-11-16 | End: 2022-11-22 | Stop reason: HOSPADM

## 2022-11-16 RX ORDER — FUROSEMIDE 10 MG/ML
80 INJECTION INTRAMUSCULAR; INTRAVENOUS
Status: DISCONTINUED | OUTPATIENT
Start: 2022-11-16 | End: 2022-11-18

## 2022-11-16 RX ADMIN — AMIODARONE HYDROCHLORIDE 200 MG: 200 TABLET ORAL at 09:11

## 2022-11-16 RX ADMIN — APIXABAN 5 MG: 5 TABLET, FILM COATED ORAL at 09:11

## 2022-11-16 RX ADMIN — FUROSEMIDE 80 MG: 10 INJECTION, SOLUTION INTRAMUSCULAR; INTRAVENOUS at 09:11

## 2022-11-16 RX ADMIN — ATORVASTATIN CALCIUM 40 MG: 40 TABLET, FILM COATED ORAL at 09:11

## 2022-11-16 RX ADMIN — INSULIN ASPART 2 UNITS: 100 INJECTION, SOLUTION INTRAVENOUS; SUBCUTANEOUS at 05:11

## 2022-11-16 RX ADMIN — ATORVASTATIN CALCIUM 40 MG: 40 TABLET, FILM COATED ORAL at 12:11

## 2022-11-16 RX ADMIN — ALLOPURINOL 100 MG: 100 TABLET ORAL at 09:11

## 2022-11-16 RX ADMIN — TIOTROPIUM BROMIDE INHALATION SPRAY 2 PUFF: 3.12 SPRAY, METERED RESPIRATORY (INHALATION) at 11:11

## 2022-11-16 RX ADMIN — PANTOPRAZOLE SODIUM 40 MG: 40 TABLET, DELAYED RELEASE ORAL at 09:11

## 2022-11-16 RX ADMIN — FERROUS SULFATE TAB 325 MG (65 MG ELEMENTAL FE) 1 EACH: 325 (65 FE) TAB at 09:11

## 2022-11-16 RX ADMIN — SACUBITRIL AND VALSARTAN 1 TABLET: 97; 103 TABLET, FILM COATED ORAL at 09:11

## 2022-11-16 RX ADMIN — METOPROLOL SUCCINATE 100 MG: 100 TABLET, EXTENDED RELEASE ORAL at 09:11

## 2022-11-16 RX ADMIN — IPRATROPIUM BROMIDE AND ALBUTEROL SULFATE 3 ML: 2.5; .5 SOLUTION RESPIRATORY (INHALATION) at 07:11

## 2022-11-16 RX ADMIN — NITROGLYCERIN 0.4 MG: 0.4 TABLET, ORALLY DISINTEGRATING SUBLINGUAL at 11:11

## 2022-11-16 RX ADMIN — GUAIFENESIN AND DEXTROMETHORPHAN HYDROBROMIDE 1 TABLET: 600; 30 TABLET, EXTENDED RELEASE ORAL at 11:11

## 2022-11-16 RX ADMIN — SACUBITRIL AND VALSARTAN 1 TABLET: 97; 103 TABLET, FILM COATED ORAL at 12:11

## 2022-11-16 RX ADMIN — FLUTICASONE FUROATE AND VILANTEROL TRIFENATATE 1 PUFF: 200; 25 POWDER RESPIRATORY (INHALATION) at 11:11

## 2022-11-16 RX ADMIN — MONTELUKAST 10 MG: 10 TABLET, FILM COATED ORAL at 09:11

## 2022-11-16 NOTE — ASSESSMENT & PLAN NOTE
Patient is identified as having Combined Systolic and Diastolic heart failure that is Acute on Chronic. CHF is currently uncontrolled due to volume overload due to: Continued edema of extremities and Weight gain    - Most recent ECHO performed and demonstrates- Results for orders placed during the hospital encounter of 11/30/21    Echo  Interpretation Summary  · The left ventricle is severely enlarged with eccentric hypertrophy and severely decreased systolic function. The estimated ejection fraction is 25%.  · There is severe left ventricular global hypokinesis.  · Mild right ventricular enlargement with moderately reduced right ventricular systolic function.  · Grade II left ventricular diastolic dysfunction.  · Biatrial enlargement.  · The estimated PA systolic pressure is 59 mmHg.  · Elevated central venous pressure (15 mmHg).  · The ascending aorta is mildly dilated.  · Small posterolateral pericardial effusion.    Recent Labs   Lab 11/15/22  1700 11/16/22  0351   TROPONINI 0.769* 0.780*   BNP 4,104*  --        Intake/Output Summary (Last 24 hours) at 11/16/2022 0827  Last data filed at 11/15/2022 2312  Gross per 24 hour   Intake --   Output 100 ml   Net -100 ml     Net IO Since Admission: -100 mL [11/16/22 0827]      Home Medications: Empagliflozin 25mg, Furosemide 40mg qd, sacubitriL-valsartan   mg per tablet BID    RECOMMENDATIONS  -Increase Furosemide to 80mg IV BID  -Cardiac diet with Fluid restriction at 1.5L with strict I/Os and daily weights  - Maintain on telemetry. Check Electrolytes, keep Mag >2 & K+ >4  -Preliminary Discharge med recs: Continue Empagliflozin 25mg,  sacubitriL-valsartan   mg  BID.   -Diuretic dosing TBD.   -Spironolactone not recommended due to low eGFR/CKD  - Heart failure clinic follow up

## 2022-11-16 NOTE — ASSESSMENT & PLAN NOTE
Home O2- 2 L's, currently at baseline, however moderately volume overloaded with [place] likely 2/2 cardiorenal   - Diurese and trend kidney function   - presented w increased SB and work of breathing, volume overload, W[place] and NSTEMI

## 2022-11-16 NOTE — SUBJECTIVE & OBJECTIVE
Past Medical History:   Diagnosis Date    Anemia     Anticoagulant long-term use     Arthritis     Atrial fibrillation     CKD (chronic kidney disease), stage IV 5/8/2018    Congestive heart failure     COPD (chronic obstructive pulmonary disease)     Deep vein thrombosis     elevated bilirubin d/t Gilbert's syndrome     confirmed by Zenia genetic testing, evaluated by hepatology    Encounter for blood transfusion     GERD (gastroesophageal reflux disease)     Hypertension     Hypertensive cardiovascular-renal disease, stage 1-4 or unspecified chronic kidney disease, with heart failure 3/4/2022    Pheochromocytoma, malignant     Restrictive lung disease 4/26/2022    Right kidney mass     Sleep apnea     Thalassemia trait, alpha     Thyroid disease     Type 2 diabetes mellitus with hyperglycemia, without long-term current use of insulin 8/13/2020       Past Surgical History:   Procedure Laterality Date    APPENDECTOMY      BONE MARROW BIOPSY      CARDIAC DEFIBRILLATOR PLACEMENT Left 12/2016    CARDIAC ELECTROPHYSIOLOGY MAPPING AND ABLATION      CARDIAC ELECTROPHYSIOLOGY MAPPING AND ABLATION      COLONOSCOPY N/A 5/6/2022    Procedure: COLONOSCOPY;  Surgeon: Arely Betancourt MD;  Location: [facility] ([address] [place] [address]);  Service: Endoscopy;  Laterality: N/A;  heart transplant candidate/ EF 25% - 2nd floor/ defib - Bi[facility]ron[facility] - ER[facility]  Eliquis - per Dr. Cortez with [facility], Pt ok to hold Eliquis x 2 days prior-see media tab-outside correspondence dated 12/30/21  - ER[facility]  verbal instructions/portal instructions/email instructions - s    EYE SURGERY      due to running tears    FRACTURE SURGERY Left     hand 5th digit    HYSTERECTOMY      KNEE SURGERY Left 2016    hematoma    LIVER BIOPSY  10/24/2018    Minimal steatosis, predominantly macrovesicular, 1%, Minimal nonspecific portal inflammation, no fibrosis. No findings on biopsy to explain elevated bilirubin levels. Could be d/t Gilbert's =?- hemolysis    RIGHT HEART CATHETERIZATION  "Right 12/07/2021    Procedure: INSERTION, CATHETER, RIGHT HEART;  Surgeon: Irving Cardenas MD;  Location: [facility] CATH LAB;  Service: Cardiology;  Laterality: Right;    TRANSJUGULAR BIOPSY OF LIVER N/A 10/24/2018    Procedure: BIOPSY, LIVER, TRANSJUGULAR APPROACH;  Surgeon: Carmen Diagnostic Provider;  Location: [facility] OR [address] [place] [address];  Service: Radiology;  Laterality: N/A;       Review of patient's allergies indicates:   Allergen Reactions    Penicillins Hives    Iodinated contrast media Nausea And Vomiting    Oxycodone-acetaminophen Other (See Comments)     Nausea, Dizziness, Anxiety.  "I don't like how it makes me feel."   Given Hydromorphone 0.5mg IVP  Without problems.  Other reaction(s): Other (See Comments)    Diovan hct [valsartan-hydrochlorothiazide] Other (See Comments)     Causes coughing    Irinotecan      Pt has homozygosity for the TA7 promoter variant that places this individual at significantly increased risk for   severe neutropenia (grade 4) when treated with the standard dose of irinotecan (risk approximately 50%).   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 Metabolism of other drugs not listed here may also be impacted by UGT1A1 enzyme activity.       Tramadol Nausea And Vomiting     Other reaction(s): Other (See Comments)       Current Facility-Administered Medications on File Prior to Encounter   Medication    0.9%  NaCl infusion    vancomycin in dextrose 5 % 1 gram/250 mL IVPB 1,000 mg     Current Outpatient Medications on File Prior to Encounter   Medication Sig    albuterol-ipratropium (DUO-NEB) 2.5 mg-0.5 mg/3 mL nebulizer solution Take 3 mLs by nebulization 2 (two) times a day.    allopurinoL (ZYLOPRIM) 100 MG tablet Take 1 tablet (100 mg total) by mouth once daily.    ALPRAZolam (XANAX) 2 MG Tab Take 2 mg by mouth 2 (two) times daily as needed.    amiodarone (PACERONE) 200 MG Tab " Take 1 tablet (200 mg total) by mouth once daily.    apixaban (ELIQUIS) 2.5 mg Tab Take 1 tablet (2.5 mg total) by mouth 2 (two) times a day. Decrease in dose    atorvastatin (LIPITOR) 40 MG tablet Take 1 tablet (40 mg total) by mouth every evening.    b complex vitamins tablet Take 1 tablet by mouth once daily.    blood sugar diagnostic (ACCU-CHEK GUIDE TEST STRIPS) Strp 1 strip by Other route 3 (three) times daily.    blood-glucose meter kit Use as instructed    diclofenac sodium (VOLTAREN) 1 % Gel APPLY 2 G TOPICALLY 3 (THREE) TIMES DAILY AS NEEDED (PAIN).    diltiazem HCl (DILTIAZEM 2% CREAM) Apply topically 3 (three) times daily. Apply topically to anal area.    empagliflozin (JARDIANCE) 25 mg tablet Jardiance 25 mg tablet    ergocalciferol (ERGOCALCIFEROL) 50,000 unit Cap Take 1 capsule (50,000 Units total) by mouth every 7 days.    ferrous sulfate 325 (65 FE) MG EC tablet Take 1 tablet (325 mg total) by mouth once daily.    fluticasone propionate (FLONASE) 50 mcg/actuation nasal spray 2 sprays by Each Nostril route daily as needed for Rhinitis.     furosemide (LASIX) 80 MG tablet Take 0.5 tablets (40 mg total) by mouth once daily. Use afternoon dose if needed    glimepiride (AMARYL) 2 MG tablet TAKE 1 TABLET BY MOUTH EVERY DAY WITH BREAKFAST    hydrocortisone 2.5 % cream APPLY TOPICALLY 2 (TWO) TIMES DAILY AS NEEDED (HEMORRHOIDS).    lancets (ACCU-CHEK SOFTCLIX LANCETS) Misc 1 Device by Misc.(Non-Drug; Combo Route) route 3 (three) times daily.    LIDOcaine (LIDODERM) 5 % Place 1 patch onto the skin once daily. Remove & Discard patch within 12 hours or as directed by MD    linaCLOtide (LINZESS) 145 mcg Cap capsule Take 1 capsule (145 mcg total) by mouth before breakfast. Hold if diarrhea    metoprolol succinate (TOPROL-XL) 100 MG 24 hr tablet Take 100 mg by mouth once daily.    mometasone-formoterol (DULERA) 200-5 mcg/actuation inhaler Inhale 2 puffs into the lungs 2 (two) times daily. Controller     montelukast (SINGULAIR) 10 mg tablet Take 1 tablet every day by oral route for 30 days.    NITROSTAT 0.4 mg SL tablet Take 2.5 tablets (1 mg total) by mouth every 5 (five) minutes as needed for Chest pain. No more than 3 tablets in 15 minutes.    nystatin (MYCOSTATIN) powder Apply topically 2 (two) times daily.    olopatadine (PATADAY) 0.2 % Drop Instill 1 drop into both eyes once daily    ondansetron (ZOFRAN-ODT) 8 MG TbDL Take 8 mg by mouth every 8 (eight) hours as needed.    pantoprazole (PROTONIX) 40 MG tablet TAKE 1 TABLET BY MOUTH DAILY IN THE MORNING    polyethylene glycol (GLYCOLAX) 17 gram PwPk Take 17 g by mouth 3 (three) times daily as needed (constipation/hard stools).    promethazine-codeine 6.25-10 mg/5 ml (PHENERGAN WITH CODEINE) 6.25-10 mg/5 mL syrup TAKE 5 mLs BY MOUTH EVERY 4 - 6 HOURS AS NEEDED    rOPINIRole (REQUIP) 4 MG tablet Take 1 tablet (4 mg total) by mouth once daily. Pt taking 4mg daily    sacubitriL-valsartan (ENTRESTO)  mg per tablet Take 1 tablet by mouth 2 (two) times daily.    scopolamine (TRANSDERM-SCOP) 1.3-1.5 mg (1 mg over 3 days) SMARTSIG:Patch(s) T-DERMAL Every 3 Days Strength: 1.3-1.5 MG over 3 days)    SPIRIVA WITH HANDIHALER 18 mcg inhalation capsule INHALE 1 CAPSULE INTO THE LUNGS ONCE DAILY.    VENTOLIN HFA 90 mcg/actuation inhaler Inhale 2 puffs into the lungs 2 (two) times daily as needed.     walker Misc 1 Device by Misc.(Non-Drug; Combo Route) route as needed.     Family History       Problem Relation (Age of Onset)    Cancer Mother    Cirrhosis Brother    Diabetes Brother    Heart attack Father    Heart disease Father, Sister, Brother, Sister, Brother    Hypertension Father, Brother          Tobacco Use    Smoking status: Never    Smokeless tobacco: Never   Substance and Sexual Activity    Alcohol use: Yes     Comment: up to 1 yr ago drank 2-3 drinks on occasion but sporadic    Drug use: No    Sexual activity: Yes     Partners: Male     Review of Systems    Constitutional: Positive for malaise/fatigue and weight gain. Negative for chills and fever.   HENT:  Negative for congestion and sore throat.    Eyes:  Negative for vision loss in left eye and vision loss in right eye.   Cardiovascular:  Positive for dyspnea on exertion and leg swelling. Negative for chest pain.   Respiratory:  Positive for cough and shortness of breath.    Musculoskeletal:  Negative for falls and muscle cramps.   Gastrointestinal:  Positive for abdominal pain, diarrhea and nausea. Negative for constipation and vomiting.   Genitourinary:  Positive for frequency (on diuresis). Negative for incomplete emptying.   Neurological:  Negative for headaches and light-headedness.   Objective:     Vital Signs (Most Recent):  Temp: 96.4 °F (35.8 °C) (11/16/22 1049)  Pulse: 82 (11/16/22 1133)  Resp: 16 (11/16/22 1133)  BP: 132/86 (11/16/22 1049)  SpO2: 98 % (11/16/22 1133)   Vital Signs (24h Range):  Temp:  [96.4 °F (35.8 °C)-98.6 °F (37 °C)] 96.4 °F (35.8 °C)  Pulse:  [] 82  Resp:  [16-24] 16  SpO2:  [91 %-100 %] 98 %  BP: (132-173)/() 132/86     Weight: 100.4 kg (221 lb 5.5 oz)  Body mass index is 35.73 kg/m².    SpO2: 98 %  O2 Device (Oxygen Therapy): nasal cannula      Intake/Output Summary (Last 24 hours) at 11/16/2022 1207  Last data filed at 11/15/2022 2312  Gross per 24 hour   Intake --   Output 100 ml   Net -100 ml       Lines/Drains/Airways       Peripheral Intravenous Line  Duration                  Peripheral IV - Single Lumen 11/15/22 1659 20 G Right Antecubital <1 day                    Physical Exam  Vitals and nursing note reviewed.   Constitutional:       General: She is not in acute distress.     Appearance: Normal appearance. She is obese. She is not ill-appearing, toxic-appearing or diaphoretic.   HENT:      Head: Normocephalic and atraumatic.      Mouth/Throat:      Mouth: Mucous membranes are moist.   Eyes:      General: No scleral icterus.        Right eye: No discharge.          Left eye: No discharge.   Neck:      Vascular: JVD present.   Cardiovascular:      Rate and Rhythm: Normal rate and regular rhythm.      Heart sounds: Normal heart sounds.   Pulmonary:      Effort: Pulmonary effort is normal. No respiratory distress.      Breath sounds: Normal breath sounds.   Abdominal:      General: Abdomen is flat. Bowel sounds are normal. There is distension.      Palpations: Abdomen is soft.      Tenderness: There is no abdominal tenderness.   Musculoskeletal:         General: Swelling present. No tenderness.      Cervical back: Normal range of motion. No rigidity.      Right lower leg: Edema (mild) present.      Left lower leg: Edema (mild) present.   Skin:     General: Skin is warm and dry.   Neurological:      Mental Status: She is alert and oriented to person, place, and time. Mental status is at baseline.   Psychiatric:         Mood and Affect: Mood normal.         Behavior: Behavior normal.           LABS:  Recent Labs   Lab 11/15/22  1700 11/16/22  0351    142   K 4.3 3.9    108   CO2 23 23   BUN 40* 43*   CREATININE 2.4* 2.1*   * 145*   ANIONGAP 10 11     Recent Labs   Lab 11/16/22  0351   MG 1.8   PHOS 3.5     Recent Labs   Lab 11/15/22  1700 11/16/22  0351   AST 23 18   ALT 42 34   ALKPHOS 52* 49*   BILITOT 1.6* 2.0*   ALBUMIN 3.5 3.4*      Recent Labs   Lab 11/15/22  1700 11/16/22  0351   WBC 8.83 7.83   HGB 8.1* 7.8*   HCT 27.9* 26.5*    203   GRAN 74.1*  6.6 68.5  5.4     Recent Labs   Lab 11/16/22  0022   INR 1.1     Recent Labs   Lab 11/15/22  1700 11/16/22  0351   TROPONINI 0.769* 0.780*     Lab Results   Component Value Date    BNP 4,104 (H) 11/15/2022     (H) 11/02/2022    BNP 2,771 (H) 10/28/2022            IMAGING:  EKGs:  Results for orders placed or performed during the hospital encounter of 10/28/22   EKG 12-lead    Collection Time: 10/29/22  5:37 AM    Narrative    Test Reason : I25.10    Vent. Rate : 080 BPM     Atrial Rate : 080  BPM     P-R Int : 188 ms          QRS Dur : 118 ms      QT Int : 436 ms       P-R-T Axes : 060 -39 094 degrees     QTc Int : 502 ms    Sinus rhythm with Premature atrial complexes  Possible Left atrial enlargement  Left axis deviation  LVH with QRS widening and repolarization abnormality  Prolonged QT  Abnormal ECG  When compared with ECG of 28-OCT-2022 14:37,  Premature ventricular complexes are no longer Present  Premature atrial complexes are now Present  Confirmed by Roney ROQUE MD ([id number]) on 10/29/2022 10:18:57 AM    Referred By: [email or web address]   SELF           Confirmed By:Roney ROQUE MD        TTE[11/30/2021]:  Summary    The left ventricle is severely enlarged with eccentric hypertrophy and severely decreased systolic function. The estimated ejection fraction is 25%.  There is severe left ventricular global hypokinesis.  Mild right ventricular enlargement with moderately reduced right ventricular systolic function.  Grade II left ventricular diastolic dysfunction.  Biatrial enlargement.  The estimated PA systolic pressure is 59 mmHg.  Elevated central venous pressure (15 mmHg).  The ascending aorta is mildly dilated.  Small posterolateral pericardial effusion.  EF   Date Value Ref Range Status   11/30/2021 25 % Final   09/17/2021 25 % Final       Prior Ischemic Evaluation[02/01/2022]:  Conclusion    Severe functional impairment associated with a normal breathing reserve, normal oxygen stauration, poor effort, and a reduced AT. These findings are indicative of functional impairment secondary to poor effort (stopped because of foot pain).  There were no arrhythmias during stress.  There was no ST segment deviation noted during stress.  The ECG portion of this study is negative for myocardial ischemia.    Significant Imaging: I have reviewed all pertinent imaging results/findings within the past 24 hours.      INPATIENT MEDICATIONS:    Scheduled Meds:   albuterol-ipratropium  3 mL Nebulization BID    allopurinoL  100  mg Oral Daily    amiodarone  200 mg Oral Daily    apixaban  5 mg Oral BID    atorvastatin  40 mg Oral QHS    ferrous sulfate  1 tablet Oral Daily    fluticasone furoate-vilanteroL  1 puff Inhalation Daily    fluticasone propionate  2 spray Each Nostril Daily    furosemide (LASIX) injection  80 mg Intravenous Daily    metoprolol succinate  100 mg Oral Daily    montelukast  10 mg Oral Daily    pantoprazole  40 mg Oral Daily    polyethylene glycol  17 g Oral Daily    sacubitriL-valsartan  1 tablet Oral BID    tiotropium bromide  2 puff Inhalation Daily     PRN Meds:ALPRAZolam, dextrose 10%, dextrose 10%, glucagon (human recombinant), glucose, glucose, insulin aspart U-100, melatonin, naloxone, prochlorperazine, sodium chloride 0.9%, sodium chloride 0.9%

## 2022-11-16 NOTE — ASSESSMENT & PLAN NOTE
- Continue amio  - Continue Toprol   - Increase Apixaban to correct dose (was on 2.5 mg BID, unclear why as she only meets 1/3 criteria for dose reduction)- may be worth investigating further, denies any GI bleeding but rather occasional bruising

## 2022-11-16 NOTE — ASSESSMENT & PLAN NOTE
Home O2- 2 L's, currently at baseline, however moderately volume overloaded with [place] likely 2/2 cardiorenal   - Diurese and trend kidney function

## 2022-11-16 NOTE — CONSULTS
[facility]  Cardiology  Consult Note    Patient Name: Hafsa Hawley  MRN: [id number]  Admission Date: 11/15/2022  Hospital Length of Stay: 1 days  Code Status: Full Code   Attending Provider: Constanza Barger MD   Consulting Provider: Cristobal Dsouza DO  Primary Care Physician: Cristobal Ann MD  Principal Problem:Acute on chronic respiratory failure with hypoxia    Patient information was obtained from patient, past medical records, ER records and primary team.     Inpatient consult to Cardiology  Consult performed by: Cristobal Dsouza DO  Consult ordered by: Constanza Barger MD        Subjective:     Chief Complaint:  CHF exacerbation     HPI:   Ms. Hawley is a 57 y.o F w/ hx of combined systolic and diastolic CHF(EF 25% 11/2021) s/p ICD, CKD stage III, morbid obesity, DM, MELISSA, HTN, pAFib on Eliquis and HLD who presented to the ED for worsening SOB. Recently admitted last month for CHF exacerbation. She reports her diuretic dose was recently cut down from Furosemide 80mg to Furosemide 40mg by Cardiology due to concerns for increased urination and worsening of kidney function. She was told to double the dose as needed, and she has done it twice without improvement in symptoms. She reports compliance with low salt diet. She reports diffuse swelling. She was previously evaluated and was not determined to be a heart transplant candidate due to CKD.    Cardiology consulted for evaluation of acute CHF exacerbation concerns with elevated troponin.      Past Medical History:   Diagnosis Date    Anemia     Anticoagulant long-term use     Arthritis     Atrial fibrillation     CKD (chronic kidney disease), stage IV 5/8/2018    Congestive heart failure     COPD (chronic obstructive pulmonary disease)     Deep vein thrombosis     elevated bilirubin d/t Gilbert's syndrome     confirmed by [place] genetic testing, evaluated by hepatology    Encounter for blood transfusion     GERD (gastroesophageal reflux  disease)     Hypertension     Hypertensive cardiovascular-renal disease, stage 1-4 or unspecified chronic kidney disease, with heart failure 3/4/2022    Pheochromocytoma, malignant     Restrictive lung disease 4/26/2022    Right kidney mass     Sleep apnea     Thalassemia trait, alpha     Thyroid disease     Type 2 diabetes mellitus with hyperglycemia, without long-term current use of insulin 8/13/2020       Past Surgical History:   Procedure Laterality Date    APPENDECTOMY      BONE MARROW BIOPSY      CARDIAC DEFIBRILLATOR PLACEMENT Left 12/2016    CARDIAC ELECTROPHYSIOLOGY MAPPING AND ABLATION      CARDIAC ELECTROPHYSIOLOGY MAPPING AND ABLATION      COLONOSCOPY N/A 5/6/2022    Procedure: COLONOSCOPY;  Surgeon: Arely Betancourt MD;  Location: [facility] ENDO (2ND FLR);  Service: Endoscopy;  Laterality: N/A;  heart transplant candidate/ EF 25% - 2nd floor/ defib - [facility] - ER[facility]  Eliquis - per Dr. Cortez with [facility], Pt ok to hold Eliquis x 2 days prior-see media tab-outside correspondence dated 12/30/21  - ER[facility]  verbal instructions/portal instructions/email instructions - s    EYE SURGERY      due to running tears    FRACTURE SURGERY Left     hand 5th digit    HYSTERECTOMY      KNEE SURGERY Left 2016    hematoma    LIVER BIOPSY  10/24/2018    Minimal steatosis, predominantly macrovesicular, 1%, Minimal nonspecific portal inflammation, no fibrosis. No findings on biopsy to explain elevated bilirubin levels. Could be d/t Gilbert's =?- hemolysis    RIGHT HEART CATHETERIZATION Right 12/07/2021    Procedure: INSERTION, CATHETER, RIGHT HEART;  Surgeon: Irving Cardenas MD;  Location: [facility] CATH LAB;  Service: Cardiology;  Laterality: Right;    TRANSJUGULAR BIOPSY OF LIVER N/A 10/24/2018    Procedure: BIOPSY, LIVER, TRANSJUGULAR APPROACH;  Surgeon: Carmen Diagnostic Provider;  [facility];  Service: Radiology;  Laterality: N/A;       Review of patient's allergies indicates:   Allergen  "Reactions    Penicillins Hives    Iodinated contrast media Nausea And Vomiting    Oxycodone-acetaminophen Other (See Comments)     Nausea, Dizziness, Anxiety.  "I don't like how it makes me feel."   Given Hydromorphone 0.5mg IVP  Without problems.  Other reaction(s): Other (See Comments)    Diovan hct [valsartan-hydrochlorothiazide] Other (See Comments)     Causes coughing    Irinotecan      Pt has homozygosity for the TA7 promoter variant that places this individual at significantly increased risk for   severe neutropenia (grade 4) when treated with the standard dose of irinotecan (risk approximately 50%).   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 Metabolism of other drugs not listed here may also be impacted by UGT1A1 enzyme activity.       Tramadol Nausea And Vomiting     Other reaction(s): Other (See Comments)       Current Facility-Administered Medications on File Prior to Encounter   Medication    0.9%  NaCl infusion    vancomycin in dextrose 5 % 1 gram/250 mL IVPB 1,000 mg     Current Outpatient Medications on File Prior to Encounter   Medication Sig    albuterol-ipratropium (DUO-NEB) 2.5 mg-0.5 mg/3 mL nebulizer solution Take 3 mLs by nebulization 2 (two) times a day.    allopurinoL (ZYLOPRIM) 100 MG tablet Take 1 tablet (100 mg total) by mouth once daily.    ALPRAZolam (XANAX) 2 MG Tab Take 2 mg by mouth 2 (two) times daily as needed.    amiodarone (PACERONE) 200 MG Tab Take 1 tablet (200 mg total) by mouth once daily.    apixaban (ELIQUIS) 2.5 mg Tab Take 1 tablet (2.5 mg total) by mouth 2 (two) times a day. Decrease in dose    atorvastatin (LIPITOR) 40 MG tablet Take 1 tablet (40 mg total) by mouth every evening.    b complex vitamins tablet Take 1 tablet by mouth once daily.    blood sugar diagnostic (ACCU-CHEK GUIDE TEST STRIPS) Strp 1 strip by Other route 3 " (three) times daily.    blood-glucose meter kit Use as instructed    diclofenac sodium (VOLTAREN) 1 % Gel APPLY 2 G TOPICALLY 3 (THREE) TIMES DAILY AS NEEDED (PAIN).    diltiazem HCl (DILTIAZEM 2% CREAM) Apply topically 3 (three) times daily. Apply topically to anal area.    empagliflozin (JARDIANCE) 25 mg tablet Jardiance 25 mg tablet    ergocalciferol (ERGOCALCIFEROL) 50,000 unit Cap Take 1 capsule (50,000 Units total) by mouth every 7 days.    ferrous sulfate 325 (65 FE) MG EC tablet Take 1 tablet (325 mg total) by mouth once daily.    fluticasone propionate (FLONASE) 50 mcg/actuation nasal spray 2 sprays by Each Nostril route daily as needed for Rhinitis.     furosemide (LASIX) 80 MG tablet Take 0.5 tablets (40 mg total) by mouth once daily. Use afternoon dose if needed    glimepiride (AMARYL) 2 MG tablet TAKE 1 TABLET BY MOUTH EVERY DAY WITH BREAKFAST    hydrocortisone 2.5 % cream APPLY TOPICALLY 2 (TWO) TIMES DAILY AS NEEDED (HEMORRHOIDS).    lancets (ACCU-CHEK SOFTCLIX LANCETS) Misc 1 Device by Misc.(Non-Drug; Combo Route) route 3 (three) times daily.    LIDOcaine (LIDODERM) 5 % Place 1 patch onto the skin once daily. Remove & Discard patch within 12 hours or as directed by MD    linaCLOtide (LINZESS) 145 mcg Cap capsule Take 1 capsule (145 mcg total) by mouth before breakfast. Hold if diarrhea    metoprolol succinate (TOPROL-XL) 100 MG 24 hr tablet Take 100 mg by mouth once daily.    mometasone-formoterol (DULERA) 200-5 mcg/actuation inhaler Inhale 2 puffs into the lungs 2 (two) times daily. Controller    montelukast (SINGULAIR) 10 mg tablet Take 1 tablet every day by oral route for 30 days.    NITROSTAT 0.4 mg SL tablet Take 2.5 tablets (1 mg total) by mouth every 5 (five) minutes as needed for Chest pain. No more than 3 tablets in 15 minutes.    nystatin (MYCOSTATIN) powder Apply topically 2 (two) times daily.    olopatadine (PATADAY) 0.2 % Drop Instill 1 drop into both eyes once daily     ondansetron (ZOFRAN-ODT) 8 MG TbDL Take 8 mg by mouth every 8 (eight) hours as needed.    pantoprazole (PROTONIX) 40 MG tablet TAKE 1 TABLET BY MOUTH DAILY IN THE MORNING    polyethylene glycol (GLYCOLAX) 17 gram PwPk Take 17 g by mouth 3 (three) times daily as needed (constipation/hard stools).    promethazine-codeine 6.25-10 mg/5 ml (PHENERGAN WITH CODEINE) 6.25-10 mg/5 mL syrup TAKE 5 mLs BY MOUTH EVERY 4 - 6 HOURS AS NEEDED    rOPINIRole (REQUIP) 4 MG tablet Take 1 tablet (4 mg total) by mouth once daily. Pt taking 4mg daily    sacubitriL-valsartan (ENTRESTO)  mg per tablet Take 1 tablet by mouth 2 (two) times daily.    scopolamine (TRANSDERM-SCOP) 1.3-1.5 mg (1 mg over 3 days) SMARTSIG:Patch(s) T-DERMAL Every 3 Days Strength: 1.3-1.5 MG over 3 days)    SPIRIVA WITH HANDIHALER 18 mcg inhalation capsule INHALE 1 CAPSULE INTO THE LUNGS ONCE DAILY.    VENTOLIN HFA 90 mcg/actuation inhaler Inhale 2 puffs into the lungs 2 (two) times daily as needed.     walker Misc 1 Device by Misc.(Non-Drug; Combo Route) route as needed.     Family History       Problem Relation (Age of Onset)    Cancer Mother    Cirrhosis Brother    Diabetes Brother    Heart attack Father    Heart disease Father, Sister, Brother, Sister, Brother    Hypertension Father, Brother          Tobacco Use    Smoking status: Never    Smokeless tobacco: Never   Substance and Sexual Activity    Alcohol use: Yes     Comment: up to 1 yr ago drank 2-3 drinks on occasion but sporadic    Drug use: No    Sexual activity: Yes     Partners: Male     Review of Systems   Constitutional: Positive for malaise/fatigue and weight gain. Negative for chills and fever.   HENT:  Negative for congestion and sore throat.    Eyes:  Negative for vision loss in left eye and vision loss in right eye.   Cardiovascular:  Positive for dyspnea on exertion and leg swelling. Negative for chest pain.   Respiratory:  Positive for cough and shortness of breath.     Musculoskeletal:  Negative for falls and muscle cramps.   Gastrointestinal:  Positive for abdominal pain, diarrhea and nausea. Negative for constipation and vomiting.   Genitourinary:  Positive for frequency (on diuresis). Negative for incomplete emptying.   Neurological:  Negative for headaches and light-headedness.   Objective:     Vital Signs (Most Recent):  Temp: 96.4 °F (35.8 °C) (11/16/22 1049)  Pulse: 82 (11/16/22 1133)  Resp: 16 (11/16/22 1133)  BP: 132/86 (11/16/22 1049)  SpO2: 98 % (11/16/22 1133)   Vital Signs (24h Range):  Temp:  [96.4 °F (35.8 °C)-98.6 °F (37 °C)] 96.4 °F (35.8 °C)  Pulse:  [] 82  Resp:  [16-24] 16  SpO2:  [91 %-100 %] 98 %  BP: (132-173)/() 132/86     Weight: 100.4 kg (221 lb 5.5 oz)  Body mass index is 35.73 kg/m².    SpO2: 98 %  O2 Device (Oxygen Therapy): nasal cannula      Intake/Output Summary (Last 24 hours) at 11/16/2022 1207  Last data filed at 11/15/2022 2312  Gross per 24 hour   Intake --   Output 100 ml   Net -100 ml       Lines/Drains/Airways       Peripheral Intravenous Line  Duration                  Peripheral IV - Single Lumen 11/15/22 1659 20 G Right Antecubital <1 day                    Physical Exam  Vitals and nursing note reviewed.   Constitutional:       General: She is not in acute distress.     Appearance: Normal appearance. She is obese. She is not ill-appearing, toxic-appearing or diaphoretic.   HENT:      Head: Normocephalic and atraumatic.      Mouth/Throat:      Mouth: Mucous membranes are moist.   Eyes:      General: No scleral icterus.        Right eye: No discharge.         Left eye: No discharge.   Neck:      Vascular: JVD present.   Cardiovascular:      Rate and Rhythm: Normal rate and regular rhythm.      Heart sounds: Normal heart sounds.   Pulmonary:      Effort: Pulmonary effort is normal. No respiratory distress.      Breath sounds: Normal breath sounds.   Abdominal:      General: Abdomen is flat. Bowel sounds are normal. There is  distension.      Palpations: Abdomen is soft.      Tenderness: There is no abdominal tenderness.   Musculoskeletal:         General: Swelling present. No tenderness.      Cervical back: Normal range of motion. No rigidity.      Right lower leg: Edema (mild) present.      Left lower leg: Edema (mild) present.   Skin:     General: Skin is warm and dry.   Neurological:      Mental Status: She is alert and oriented to person, place, and time. Mental status is at baseline.   Psychiatric:         Mood and Affect: Mood normal.         Behavior: Behavior normal.           LABS:  Recent Labs   Lab 11/15/22  1700 11/16/22  0351    142   K 4.3 3.9    108   CO2 23 23   BUN 40* 43*   CREATININE 2.4* 2.1*   * 145*   ANIONGAP 10 11     Recent Labs   Lab 11/16/22  0351   MG 1.8   PHOS 3.5     Recent Labs   Lab 11/15/22  1700 11/16/22  0351   AST 23 18   ALT 42 34   ALKPHOS 52* 49*   BILITOT 1.6* 2.0*   ALBUMIN 3.5 3.4*      Recent Labs   Lab 11/15/22  1700 11/16/22  0351   WBC 8.83 7.83   HGB 8.1* 7.8*   HCT 27.9* 26.5*    203   GRAN 74.1*  6.6 68.5  5.4     Recent Labs   Lab 11/16/22  0022   INR 1.1     Recent Labs   Lab 11/15/22  1700 11/16/22  0351   TROPONINI 0.769* 0.780*     Lab Results   Component Value Date    BNP 4,104 (H) 11/15/2022     (H) 11/02/2022    BNP 2,771 (H) 10/28/2022            IMAGING:  EKGs:  Results for orders placed or performed during the hospital encounter of 10/28/22   EKG 12-lead    Collection Time: 10/29/22  5:37 AM    Narrative    Test Reason : I25.10    Vent. Rate : 080 BPM     Atrial Rate : 080 BPM     P-R Int : 188 ms          QRS Dur : 118 ms      QT Int : 436 ms       P-R-T Axes : 060 -39 094 degrees     QTc Int : 502 ms    Sinus rhythm with Premature atrial complexes  Possible Left atrial enlargement  Left axis deviation  LVH with QRS widening and repolarization abnormality  Prolonged QT  Abnormal ECG  When compared with ECG of 28-OCT-2022 14:37,  Premature  ventricular complexes are no longer Present  Premature atrial complexes are now Present  Confirmed by Roney ROQUE MD ([id number]) on 10/29/2022 10:18:57 AM    Referred By: [email or web address]   SELF           Confirmed By:Roney ROQUE MD        TTE[11/30/2021]:  Summary     The left ventricle is severely enlarged with eccentric hypertrophy and severely decreased systolic function. The estimated ejection fraction is 25%.   There is severe left ventricular global hypokinesis.   Mild right ventricular enlargement with moderately reduced right ventricular systolic function.   Grade II left ventricular diastolic dysfunction.   Biatrial enlargement.   The estimated PA systolic pressure is 59 mmHg.   Elevated central venous pressure (15 mmHg).   The ascending aorta is mildly dilated.   Small posterolateral pericardial effusion.  EF   Date Value Ref Range Status   11/30/2021 25 % Final   09/17/2021 25 % Final       Prior Ischemic Evaluation[02/01/2022]:  Conclusion     Severe functional impairment associated with a normal breathing reserve, normal oxygen stauration, poor effort, and a reduced AT. These findings are indicative of functional impairment secondary to poor effort (stopped because of foot pain).   There were no arrhythmias during stress.   There was no ST segment deviation noted during stress.   The ECG portion of this study is negative for myocardial ischemia.    Significant Imaging: I have reviewed all pertinent imaging results/findings within the past 24 hours.      INPATIENT MEDICATIONS:    Scheduled Meds:   albuterol-ipratropium  3 mL Nebulization BID    allopurinoL  100 mg Oral Daily    amiodarone  200 mg Oral Daily    apixaban  5 mg Oral BID    atorvastatin  40 mg Oral QHS    ferrous sulfate  1 tablet Oral Daily    fluticasone furoate-vilanteroL  1 puff Inhalation Daily    fluticasone propionate  2 spray Each Nostril Daily    furosemide (LASIX) injection  80 mg Intravenous Daily    metoprolol succinate   100 mg Oral Daily    montelukast  10 mg Oral Daily    pantoprazole  40 mg Oral Daily    polyethylene glycol  17 g Oral Daily    sacubitriL-valsartan  1 tablet Oral BID    tiotropium bromide  2 puff Inhalation Daily     PRN Meds:ALPRAZolam, dextrose 10%, dextrose 10%, glucagon (human recombinant), glucose, glucose, insulin aspart U-100, melatonin, naloxone, prochlorperazine, sodium chloride 0.9%, sodium chloride 0.9%        Assessment and Plan:     Acute on chronic combined systolic and diastolic heart failure  Patient is identified as having Combined Systolic and Diastolic heart failure that is Acute on Chronic. CHF is currently uncontrolled due to volume overload due to: Continued edema of extremities and Weight gain    - Most recent ECHO performed and demonstrates- Results for orders placed during the hospital encounter of 11/30/21    Echo  Interpretation Summary  · The left ventricle is severely enlarged with eccentric hypertrophy and severely decreased systolic function. The estimated ejection fraction is 25%.  · There is severe left ventricular global hypokinesis.  · Mild right ventricular enlargement with moderately reduced right ventricular systolic function.  · Grade II left ventricular diastolic dysfunction.  · Biatrial enlargement.  · The estimated PA systolic pressure is 59 mmHg.  · Elevated central venous pressure (15 mmHg).  · The ascending aorta is mildly dilated.  · Small posterolateral pericardial effusion.    Recent Labs   Lab 11/15/22  1700 11/16/22  0351   TROPONINI 0.769* 0.780*   BNP 4,104*  --        Intake/Output Summary (Last 24 hours) at 11/16/2022 0827  Last data filed at 11/15/2022 2312  Gross per 24 hour   Intake --   Output 100 ml   Net -100 ml     Net IO Since Admission: -100 mL [11/16/22 0827]      Home Medications: Empagliflozin 25mg, Furosemide 40mg qd, sacubitriL-valsartan   mg per tablet BID    RECOMMENDATIONS  -Increase Furosemide to 80mg IV BID  -Cardiac diet with  Fluid restriction at 1.5L with strict I/Os and daily weights  - Maintain on telemetry. Check Electrolytes, keep Mag >2 & K+ >4  -Preliminary Discharge med recs: Continue Empagliflozin 25mg,  sacubitriL-valsartan   mg  BID.   -Diuretic dosing TBD.   -Spironolactone not recommended due to low eGFR/CKD  - Heart failure clinic follow up     Elevated troponin  Troponin remains flat at around 0.7-0.8. Likely secondary to CKD, chronic dilated cardiomyopathy, and demand ischemia secondary to HF.    RECOMMENDATIONS  Trend troponin to peak      Thank you for your consult. I will follow-up with patient. Please contact us if you have any additional questions.    Cristobal Dsouza,   Cardiology   [facility] - Med Surg

## 2022-11-16 NOTE — ASSESSMENT & PLAN NOTE
Last EF 25%. Grade II DD  Clinically moderately volume overloaded   She is on her home O2, suspect quick turnaround, BNP likely elevated in setting of home Entresto      - Diuresis with Lasix 80 mg IV QD, transition to po as able    - Continue GDMT   - Not a great candidate for aldactone given Cr near 2.5

## 2022-11-16 NOTE — ASSESSMENT & PLAN NOTE
- Continue amio  - Continue Toprol   - Increase Apixaban to correct dose (was on 2.5 mg BID, unclear why as she only meets 1/3 criteria for dose reduction)- may be worth investigating further, denies any GI bleeding but rather occasional bruising   11/16- HR stable

## 2022-11-16 NOTE — PROGRESS NOTES
[facility] Medicine  Progress Note    Patient Name: Hafsa Hawley  MRN: [id number]  Patient Class: IP- Inpatient   Admission Date: 11/15/2022  Length of Stay: 1 days  Attending Physician: Constanza Barger MD  Primary Care Provider: Cristobal Ann MD        Subjective:     Principal Problem:Acute on chronic respiratory failure with hypoxia        HPI:  58 y/o F with h/o NICM, combined systolic and diastolic CHF (EF 25%, Grade II DD), s/p AICD, COPD (home 2 L's O2), pHTN, NIDDM (A1c 6.5) and permanent A-fib that presents to [facility] with increasing shortness of breath since her recent discharge from the hospital. Of note, the patient reports that she was recently cut down from her 80 mg po QD dose to 40 mg po QD dose by her cardiologist. She has been dyspneic at rest and reports chronic cough that is productive of phlegm, however phlegm/sputum amount has not changed. Her oxygen requirement has not increased, but she does feel more short of breath at rest. She has been urinating frequently, but does think she has gained weight since her discharge- reports dry weight at 207 lbs. Unclear weight prior to arrival. She does report fevers, chills, sharp chest pain, urinary frequency, n/v, intermittent diarrhea mixed with periods of constipation and muscle pain.    She currently lives in a mobile home with her ex and her grandchild. Ambulates with a walker if walking outside, but does not need assistive devices in the mobile home as it is narrow and she can grasp her surroundings. She does not have much support at home.       Overview/Hospital Course:  11/16- /90   Pulse 83 . On lasix 80 iv bid, on metoprolol, entresto, amiodorone, . Cr 2.4 0> 2.1, trop 0.7. UO- not recorded. Producing.    She is asking about a heart transplant. Apparently was declined in the past and she does not understand why. she is tearful and upset. Will ask cardiology to ome talk to her and see what can be done.       Interval  History: see above    Review of Systems   Constitutional:  Negative for activity change, chills, fatigue and fever.   HENT:  Negative for congestion, nosebleeds and trouble swallowing.    Respiratory:  Positive for cough and shortness of breath. Negative for apnea, choking and chest tightness.    Cardiovascular:  Positive for chest pain and leg swelling.   Gastrointestinal:  Negative for abdominal distention, abdominal pain, constipation, diarrhea, nausea and vomiting.   Genitourinary:  Negative for decreased urine volume, difficulty urinating, dysuria and frequency.   Musculoskeletal:  Negative for arthralgias, back pain, joint swelling, neck pain and neck stiffness.   Skin:  Negative for rash and wound.   Neurological:  Positive for dizziness. Negative for seizures, syncope, weakness, light-headedness, numbness and headaches.   Psychiatric/Behavioral:  Negative for agitation, behavioral problems, confusion, hallucinations, self-injury and sleep disturbance. The patient is not nervous/anxious.    Objective:     Vital Signs (Most Recent):  Temp: 97.2 °F (36.2 °C) (11/16/22 0409)  Pulse: 83 (11/16/22 0409)  Resp: 19 (11/16/22 0409)  BP: (!) 147/90 (11/16/22 0409)  SpO2: 98 % (11/16/22 0409)   Vital Signs (24h Range):  Temp:  [97.2 °F (36.2 °C)-98.6 °F (37 °C)] 97.2 °F (36.2 °C)  Pulse:  [] 83  Resp:  [16-28] 19  SpO2:  [93 %-100 %] 98 %  BP: (124-173)/() 147/90     Weight: 100.4 kg (221 lb 5.5 oz)  Body mass index is 35.73 kg/m².    Intake/Output Summary (Last 24 hours) at 11/16/2022 0700  Last data filed at 11/15/2022 2312  Gross per 24 hour   Intake --   Output 100 ml   Net -100 ml      Physical Exam  Constitutional:       General: She is not in acute distress.     Appearance: Normal appearance. She is obese. She is ill-appearing. She is not toxic-appearing or diaphoretic.   HENT:      Head: Normocephalic and atraumatic.      Nose: Nose normal.      Mouth/Throat:      Mouth: Mucous membranes are moist.    Eyes:      General: No scleral icterus.     Extraocular Movements: Extraocular movements intact.      Pupils: Pupils are equal, round, and reactive to light.   Cardiovascular:      Rate and Rhythm: Normal rate and regular rhythm.      Pulses: Normal pulses.      Heart sounds: Normal heart sounds. No murmur heard.    No friction rub. No gallop.   Pulmonary:      Effort: Pulmonary effort is normal.      Breath sounds: Normal breath sounds. No wheezing or rhonchi.   Chest:      Chest wall: No tenderness.   Abdominal:      General: Abdomen is flat. Bowel sounds are normal. There is no distension.      Palpations: Abdomen is soft.      Tenderness: There is no abdominal tenderness. There is no right CVA tenderness, left CVA tenderness, guarding or rebound.   Musculoskeletal:         General: Swelling present. No tenderness, deformity or signs of injury. Normal range of motion.      Cervical back: Normal range of motion and neck supple. No rigidity or tenderness.      Right lower leg: Edema present.      Left lower leg: Edema present.   Lymphadenopathy:      Cervical: No cervical adenopathy.   Skin:     General: Skin is warm and dry.      Coloration: Skin is not jaundiced or pale.      Findings: No erythema or rash.   Neurological:      General: No focal deficit present.      Mental Status: She is alert and oriented to person, place, and time. Mental status is at baseline.      Cranial Nerves: No cranial nerve deficit.      Motor: No weakness.   Psychiatric:         Mood and Affect: Mood normal.         Behavior: Behavior normal.         Thought Content: Thought content normal.         Judgment: Judgment normal.       Significant Labs: All pertinent labs within the past 24 hours have been reviewed.  CBC:   Recent Labs   Lab 11/15/22  1700 11/16/22  0351   WBC 8.83 7.83   HGB 8.1* 7.8*   HCT 27.9* 26.5*    203     CMP:   Recent Labs   Lab 11/15/22  1700 11/16/22  0351    142   K 4.3 3.9    108   CO2 23  23   * 145*   BUN 40* 43*   CREATININE 2.4* 2.1*   CALCIUM 9.0 9.2   PROT 6.2 6.0   ALBUMIN 3.5 3.4*   BILITOT 1.6* 2.0*   ALKPHOS 52* 49*   AST 23 18   ALT 42 34   ANIONGAP 10 11       Significant Imaging: I have reviewed all pertinent imaging results/findings within the past 24 hours.      Assessment/Plan:      * Acute on chronic respiratory failure with hypoxia  Home O2- 2 L's, currently at baseline, however moderately volume overloaded with WASHINGTON likely 2/2 cardiorenal   - Diurese and trend kidney function   - presented w increased SB and work of breathing, volume overload, W[place] and NSTEMI    Acute on chronic combined systolic and diastolic heart failure  Last EF 25%. Grade II DD  Clinically moderately volume overloaded   She is on her home O2, suspect quick turnaround, BNP likely elevated in setting of home Entresto      - Diuresis with Lasix 80 mg IV QD, transition to po as able    - Continue GDMT   - Not a great candidate for aldactone given Cr near 2.5   11/16 - trend troponin 0.7 x two. will consult cardiology.  Appreciate recs. F/u Dr. Javier- has appoint ment next week. Wants heart/ kidney transplant eval    Elevated troponin  - Likely elevated in setting of CKD- repeat and trend to peak   - 0.7 x 2, consult Cardiology    [place] (acute kidney injury)  Baseline Creatinine: 1.8-2.1  - Creatinine on admission: 2.4  - Urine lytes (Urine Na, Urine Cr, Urine Urea if on diuretic) and UA ordered   - Consider US of the retroperitoneum  - PRN Bladder Scans ordered    11/16- cr 2.4-> 2.1 after IV laasix    Paroxysmal atrial fibrillation  - Continue amio  - Continue Toprol   - Increase Apixaban to correct dose (was on 2.5 mg BID, unclear why as she only meets 1/3 criteria for dose reduction)- may be worth investigating further, denies any GI bleeding but rather occasional bruising   11/16- HR stable      ICD (implantable cardioverter-defibrillator) in place  - Tele       Type 2 diabetes mellitus without  complication, without long-term current use of insulin  - Home- Jardiance and Glimepiride  - LDSSI  - POCT checks ordered   No results for input(s): POCTGLUCOSE in the last 72 hours.      Essential hypertension  Continue home meds     MELISSA (obstructive sleep apnea)  - CPAP ordered     Hypertensive cardiovascular-renal disease, stage 1-4 or unspecified chronic kidney disease, with heart failure  See above      elevated bilirubin d/t Gilbert's syndrome  - Monitor CMP       Depression due to physical illness  Home Med- Cymbalta for chronic medical illnesses, not compliant at present       VTE Risk Mitigation (From admission, onward)         Ordered     apixaban tablet 5 mg  2 times daily         11/15/22 2229     Reason for No Pharmacological VTE Prophylaxis  Once        Question:  Reasons:  Answer:  Physician Provided (leave comment)  Comment:  apixaban    11/15/22 2227     IP VTE HIGH RISK PATIENT  Once         11/15/22 2227     Place sequential compression device  Until discontinued         11/15/22 2227     Place sequential compression device  Until discontinued         11/15/22 2111                Discharge Planning   CHIQUI: 11/18/2022     Code Status: Full Code   Is the patient medically ready for discharge?: No    Reason for patient still in hospital (select all that apply): Patient trending condition           Constanza Barger MD  Department of Hospital Medicine   [facility]

## 2022-11-16 NOTE — ASSESSMENT & PLAN NOTE
- Home- Jardiance and Glimepiride  - LDSSI  - POCT checks ordered   No results for input(s): POCTGLUCOSE in the last 72 hours.

## 2022-11-16 NOTE — HPI
Ms. Hawley is a 57 y.o F w/ hx of combined systolic and diastolic CHF(EF 25% 11/2021) s/p ICD, CKD stage III, morbid obesity, DM, MELISSA, HTN, pAFib on Eliquis and HLD who presented to the ED for worsening SOB. Recently admitted last month for CHF exacerbation. She reports her diuretic dose was recently cut down from Furosemide 80mg to Furosemide 40mg by Cardiology due to concerns for increased urination and worsening of kidney function. She was told to double the dose as needed, and she has done it twice without improvement in symptoms. She reports compliance with low salt diet. She reports diffuse swelling. She was previously evaluated and was not determined to be a heart transplant candidate due to CKD.    Cardiology consulted for evaluation of acute CHF exacerbation concerns with elevated troponin.

## 2022-11-16 NOTE — SUBJECTIVE & OBJECTIVE
Interval History: see above    Review of Systems   Constitutional:  Negative for activity change, chills, fatigue and fever.   HENT:  Negative for congestion, nosebleeds and trouble swallowing.    Respiratory:  Positive for cough and shortness of breath. Negative for apnea, choking and chest tightness.    Cardiovascular:  Positive for chest pain and leg swelling.   Gastrointestinal:  Negative for abdominal distention, abdominal pain, constipation, diarrhea, nausea and vomiting.   Genitourinary:  Negative for decreased urine volume, difficulty urinating, dysuria and frequency.   Musculoskeletal:  Negative for arthralgias, back pain, joint swelling, neck pain and neck stiffness.   Skin:  Negative for rash and wound.   Neurological:  Positive for dizziness. Negative for seizures, syncope, weakness, light-headedness, numbness and headaches.   Psychiatric/Behavioral:  Negative for agitation, behavioral problems, confusion, hallucinations, self-injury and sleep disturbance. The patient is not nervous/anxious.    Objective:     Vital Signs (Most Recent):  Temp: 97.2 °F (36.2 °C) (11/16/22 0409)  Pulse: 83 (11/16/22 0409)  Resp: 19 (11/16/22 0409)  BP: (!) 147/90 (11/16/22 0409)  SpO2: 98 % (11/16/22 0409)   Vital Signs (24h Range):  Temp:  [97.2 °F (36.2 °C)-98.6 °F (37 °C)] 97.2 °F (36.2 °C)  Pulse:  [] 83  Resp:  [16-28] 19  SpO2:  [93 %-100 %] 98 %  BP: (124-173)/() 147/90     Weight: 100.4 kg (221 lb 5.5 oz)  Body mass index is 35.73 kg/m².    Intake/Output Summary (Last 24 hours) at 11/16/2022 0700  Last data filed at 11/15/2022 2312  Gross per 24 hour   Intake --   Output 100 ml   Net -100 ml      Physical Exam  Constitutional:       General: She is not in acute distress.     Appearance: Normal appearance. She is obese. She is ill-appearing. She is not toxic-appearing or diaphoretic.   HENT:      Head: Normocephalic and atraumatic.      Nose: Nose normal.      Mouth/Throat:      Mouth: Mucous membranes  are moist.   Eyes:      General: No scleral icterus.     Extraocular Movements: Extraocular movements intact.      Pupils: Pupils are equal, round, and reactive to light.   Cardiovascular:      Rate and Rhythm: Normal rate and regular rhythm.      Pulses: Normal pulses.      Heart sounds: Normal heart sounds. No murmur heard.    No friction rub. No gallop.   Pulmonary:      Effort: Pulmonary effort is normal.      Breath sounds: Normal breath sounds. No wheezing or rhonchi.   Chest:      Chest wall: No tenderness.   Abdominal:      General: Abdomen is flat. Bowel sounds are normal. There is no distension.      Palpations: Abdomen is soft.      Tenderness: There is no abdominal tenderness. There is no right CVA tenderness, left CVA tenderness, guarding or rebound.   Musculoskeletal:         General: Swelling present. No tenderness, deformity or signs of injury. Normal range of motion.      Cervical back: Normal range of motion and neck supple. No rigidity or tenderness.      Right lower leg: Edema present.      Left lower leg: Edema present.   Lymphadenopathy:      Cervical: No cervical adenopathy.   Skin:     General: Skin is warm and dry.      Coloration: Skin is not jaundiced or pale.      Findings: No erythema or rash.   Neurological:      General: No focal deficit present.      Mental Status: She is alert and oriented to person, place, and time. Mental status is at baseline.      Cranial Nerves: No cranial nerve deficit.      Motor: No weakness.   Psychiatric:         Mood and Affect: Mood normal.         Behavior: Behavior normal.         Thought Content: Thought content normal.         Judgment: Judgment normal.       Significant Labs: All pertinent labs within the past 24 hours have been reviewed.  CBC:   Recent Labs   Lab 11/15/22  1700 11/16/22  0351   WBC 8.83 7.83   HGB 8.1* 7.8*   HCT 27.9* 26.5*    203     CMP:   Recent Labs   Lab 11/15/22  1700 11/16/22  0351    142   K 4.3 3.9     108   CO2 23 23   * 145*   BUN 40* 43*   CREATININE 2.4* 2.1*   CALCIUM 9.0 9.2   PROT 6.2 6.0   ALBUMIN 3.5 3.4*   BILITOT 1.6* 2.0*   ALKPHOS 52* 49*   AST 23 18   ALT 42 34   ANIONGAP 10 11       Significant Imaging: I have reviewed all pertinent imaging results/findings within the past 24 hours.

## 2022-11-16 NOTE — SUBJECTIVE & OBJECTIVE
Past Medical History:   Diagnosis Date    Anemia     Anticoagulant long-term use     Arthritis     Atrial fibrillation     CKD (chronic kidney disease), stage IV 5/8/2018    Congestive heart failure     COPD (chronic obstructive pulmonary disease)     Deep vein thrombosis     elevated bilirubin d/t Gilbert's syndrome     confirmed by [place] genetic testing, evaluated by hepatology    Encounter for blood transfusion     GERD (gastroesophageal reflux disease)     Hypertension     Hypertensive cardiovascular-renal disease, stage 1-4 or unspecified chronic kidney disease, with heart failure 3/4/2022    Pheochromocytoma, malignant     Restrictive lung disease 4/26/2022    Right kidney mass     Sleep apnea     Thalassemia trait, alpha     Thyroid disease     Type 2 diabetes mellitus with hyperglycemia, without long-term current use of insulin 8/13/2020       Past Surgical History:   Procedure Laterality Date    APPENDECTOMY      BONE MARROW BIOPSY      CARDIAC DEFIBRILLATOR PLACEMENT Left 12/2016    CARDIAC ELECTROPHYSIOLOGY MAPPING AND ABLATION      CARDIAC ELECTROPHYSIOLOGY MAPPING AND ABLATION      COLONOSCOPY N/A 5/6/2022    Procedure: COLONOSCOPY;  Surgeon: Arely Betancourt MD;  Location: [facility] ([address] [place] [address]);  Service: Endoscopy;  Laterality: N/A;  heart transplant candidate/ EF 25% - 2nd floor/ defib - Biotronik - ERW  Eliquis - per Dr. Cortez with [facility], Pt ok to hold Eliquis x 2 days prior-see media tab-outside correspondence dated 12/30/21  - ERW  verbal instructions/portal instructions/email instructions - s    EYE SURGERY      due to running tears    FRACTURE SURGERY Left     hand 5th digit    HYSTERECTOMY      KNEE SURGERY Left 2016    hematoma    LIVER BIOPSY  10/24/2018    Minimal steatosis, predominantly macrovesicular, 1%, Minimal nonspecific portal inflammation, no fibrosis. No findings on biopsy to explain elevated bilirubin levels. Could be d/t Gilbert's =?- hemolysis    RIGHT HEART CATHETERIZATION  "Right 12/07/2021    Procedure: INSERTION, CATHETER, RIGHT HEART;  Surgeon: Irving Cardenas MD;  Location: [facility] CATH LAB;  Service: Cardiology;  Laterality: Right;    TRANSJUGULAR BIOPSY OF LIVER N/A 10/24/2018    Procedure: BIOPSY, LIVER, TRANSJUGULAR APPROACH;  Surgeon: Carmen Diagnostic Provider;  Location: [facility] OR [address] [place] [address];  Service: Radiology;  Laterality: N/A;       Review of patient's allergies indicates:   Allergen Reactions    Penicillins Hives    Iodinated contrast media Nausea And Vomiting    Oxycodone-acetaminophen Other (See Comments)     Nausea, Dizziness, Anxiety.  "I don't like how it makes me feel."   Given Hydromorphone 0.5mg IVP  Without problems.  Other reaction(s): Other (See Comments)    Diovan hct [valsartan-hydrochlorothiazide] Other (See Comments)     Causes coughing    Irinotecan      Pt has homozygosity for the TA7 promoter variant that places this individual at significantly increased risk for   severe neutropenia (grade 4) when treated with the standard dose of irinotecan (risk approximately 50%).   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 Metabolism of other drugs not listed here may also be impacted by UGT1A1 enzyme activity.       Tramadol Nausea And Vomiting     Other reaction(s): Other (See Comments)       Current Facility-Administered Medications on File Prior to Encounter   Medication    0.9%  NaCl infusion    vancomycin in dextrose 5 % 1 gram/250 mL IVPB 1,000 mg     Current Outpatient Medications on File Prior to Encounter   Medication Sig    albuterol-ipratropium (DUO-NEB) 2.5 mg-0.5 mg/3 mL nebulizer solution Take 3 mLs by nebulization 2 (two) times a day.    allopurinoL (ZYLOPRIM) 100 MG tablet Take 1 tablet (100 mg total) by mouth once daily.    ALPRAZolam (XANAX) 2 MG Tab Take 2 mg by mouth 2 (two) times daily as needed.    amiodarone (PACERONE) 200 MG Tab " Take 1 tablet (200 mg total) by mouth once daily.    apixaban (ELIQUIS) 2.5 mg Tab Take 1 tablet (2.5 mg total) by mouth 2 (two) times a day. Decrease in dose    atorvastatin (LIPITOR) 40 MG tablet Take 1 tablet (40 mg total) by mouth every evening.    b complex vitamins tablet Take 1 tablet by mouth once daily.    blood sugar diagnostic (ACCU-CHEK GUIDE TEST STRIPS) Strp 1 strip by Other route 3 (three) times daily.    blood-glucose meter kit Use as instructed    diclofenac sodium (VOLTAREN) 1 % Gel APPLY 2 G TOPICALLY 3 (THREE) TIMES DAILY AS NEEDED (PAIN).    diltiazem HCl (DILTIAZEM 2% CREAM) Apply topically 3 (three) times daily. Apply topically to anal area.    empagliflozin (JARDIANCE) 25 mg tablet Jardiance 25 mg tablet    ergocalciferol (ERGOCALCIFEROL) 50,000 unit Cap Take 1 capsule (50,000 Units total) by mouth every 7 days.    ferrous sulfate 325 (65 FE) MG EC tablet Take 1 tablet (325 mg total) by mouth once daily.    fluticasone propionate (FLONASE) 50 mcg/actuation nasal spray 2 sprays by Each Nostril route daily as needed for Rhinitis.     furosemide (LASIX) 80 MG tablet Take 0.5 tablets (40 mg total) by mouth once daily. Use afternoon dose if needed    glimepiride (AMARYL) 2 MG tablet TAKE 1 TABLET BY MOUTH EVERY DAY WITH BREAKFAST    hydrocortisone 2.5 % cream APPLY TOPICALLY 2 (TWO) TIMES DAILY AS NEEDED (HEMORRHOIDS).    lancets (ACCU-CHEK SOFTCLIX LANCETS) Misc 1 Device by Misc.(Non-Drug; Combo Route) route 3 (three) times daily.    LIDOcaine (LIDODERM) 5 % Place 1 patch onto the skin once daily. Remove & Discard patch within 12 hours or as directed by MD    linaCLOtide (LINZESS) 145 mcg Cap capsule Take 1 capsule (145 mcg total) by mouth before breakfast. Hold if diarrhea    metoprolol succinate (TOPROL-XL) 100 MG 24 hr tablet Take 100 mg by mouth once daily.    mometasone-formoterol (DULERA) 200-5 mcg/actuation inhaler Inhale 2 puffs into the lungs 2 (two) times daily. Controller     montelukast (SINGULAIR) 10 mg tablet Take 1 tablet every day by oral route for 30 days.    NITROSTAT 0.4 mg SL tablet Take 2.5 tablets (1 mg total) by mouth every 5 (five) minutes as needed for Chest pain. No more than 3 tablets in 15 minutes.    nystatin (MYCOSTATIN) powder Apply topically 2 (two) times daily.    olopatadine (PATADAY) 0.2 % Drop Instill 1 drop into both eyes once daily    ondansetron (ZOFRAN-ODT) 8 MG TbDL Take 8 mg by mouth every 8 (eight) hours as needed.    pantoprazole (PROTONIX) 40 MG tablet TAKE 1 TABLET BY MOUTH DAILY IN THE MORNING    polyethylene glycol (GLYCOLAX) 17 gram PwPk Take 17 g by mouth 3 (three) times daily as needed (constipation/hard stools).    promethazine-codeine 6.25-10 mg/5 ml (PHENERGAN WITH CODEINE) 6.25-10 mg/5 mL syrup TAKE 5 mLs BY MOUTH EVERY 4 - 6 HOURS AS NEEDED    rOPINIRole (REQUIP) 4 MG tablet Take 1 tablet (4 mg total) by mouth once daily. Pt taking 4mg daily    sacubitriL-valsartan (ENTRESTO)  mg per tablet Take 1 tablet by mouth 2 (two) times daily.    scopolamine (TRANSDERM-SCOP) 1.3-1.5 mg (1 mg over 3 days) SMARTSIG:Patch(s) T-DERMAL Every 3 Days Strength: 1.3-1.5 MG over 3 days)    SPIRIVA WITH HANDIHALER 18 mcg inhalation capsule INHALE 1 CAPSULE INTO THE LUNGS ONCE DAILY.    VENTOLIN HFA 90 mcg/actuation inhaler Inhale 2 puffs into the lungs 2 (two) times daily as needed.     walker Misc 1 Device by Misc.(Non-Drug; Combo Route) route as needed.    [DISCONTINUED] DULoxetine (CYMBALTA) 30 MG capsule Take 1 capsule (30 mg total) by mouth once daily.     Family History       Problem Relation (Age of Onset)    Cancer Mother    Cirrhosis Brother    Diabetes Brother    Heart attack Father    Heart disease Father, Sister, Brother, Sister, Brother    Hypertension Father, Brother          Tobacco Use    Smoking status: Never    Smokeless tobacco: Never   Substance and Sexual Activity    Alcohol use: Yes     Comment: up to 1 yr ago drank 2-3 drinks on  occasion but sporadic    Drug use: No    Sexual activity: Yes     Partners: Male     Review of Systems   Constitutional:  Positive for fatigue and fever (subjective, none recorded). Negative for chills.   HENT:  Negative for rhinorrhea and sore throat.    Respiratory:  Positive for cough (chronic) and shortness of breath.    Cardiovascular:  Positive for chest pain (sharp, left sided, intermittent, none during my eval) and leg swelling.   Gastrointestinal:  Positive for constipation, diarrhea, nausea and vomiting. Negative for abdominal pain.   Genitourinary:  Positive for frequency and urgency. Negative for dysuria.   Musculoskeletal:  Positive for neck pain. Negative for myalgias.   Skin:  Negative for rash and wound.   Neurological:  Positive for dizziness. Negative for headaches.   Psychiatric/Behavioral:  Negative for agitation and confusion.    Objective:     Vital Signs (Most Recent):  Temp: 98.1 °F (36.7 °C) (11/15/22 1636)  Pulse: 86 (11/15/22 2242)  Resp: 18 (11/15/22 2242)  BP: (!) 154/99 (11/15/22 2130)  SpO2: 100 % (11/15/22 2242)   Vital Signs (24h Range):  Temp:  [98.1 °F (36.7 °C)] 98.1 °F (36.7 °C)  Pulse:  [] 86  Resp:  [18-28] 18  SpO2:  [93 %-100 %] 100 %  BP: (124-173)/() 154/99     Weight: 93.9 kg (207 lb)  Body mass index is 33.41 kg/m².    Physical Exam  Vitals and nursing note reviewed.   Constitutional:       General: She is not in acute distress.     Appearance: She is not ill-appearing or toxic-appearing.   HENT:      Head: Normocephalic and atraumatic.      Nose: Nose normal.   Eyes:      General: No scleral icterus.     Extraocular Movements: Extraocular movements intact.   Neck:      Comments: + JVD   +HJR  Cardiovascular:      Rate and Rhythm: Normal rate and regular rhythm.      Pulses: Normal pulses.   Pulmonary:      Effort: Pulmonary effort is normal. No respiratory distress.      Breath sounds: Rales present. No wheezing.      Comments: On 2 L's NC   Abdominal:       General: Abdomen is flat. Bowel sounds are normal. There is no distension.      Palpations: Abdomen is soft.      Tenderness: There is no abdominal tenderness. There is no guarding.   Musculoskeletal:         General: Swelling present.      Right lower leg: Edema (1+) present.      Left lower leg: Edema (1+) present.   Skin:     General: Skin is warm and dry.      Coloration: Skin is not jaundiced or pale.      Findings: No lesion or rash.   Neurological:      Mental Status: She is alert and oriented to person, place, and time. Mental status is at baseline.   Psychiatric:         Mood and Affect: Mood normal.           Significant Labs: All pertinent labs within the past 24 hours have been reviewed.    Significant Imaging: I have reviewed all pertinent imaging results/findings within the past 24 hours.

## 2022-11-16 NOTE — ED PROVIDER NOTES
"Encounter Date: 11/15/2022       History     Chief Complaint   Patient presents with    Chest Pain    Shortness of Breath     Sent from clinic has aicd     57-year-old female with past medical history of CHF, AICD, COPD, Gilbert's syndrome, presenting with LAGUERRE for 2 wks. Pt sent from clinic today. Patient reports that over the last 2 weeks she has had increased shortness of breath, worsened with exertion.  She states when she lays flat she can hear the wheezes and crackles, and has had a cough productive of clear sputum.  Endorses increased swelling in her abdomen and face which she associates with volume overload.  She states that her Lasix was recently decreased by her nurse practitioner due to worsening renal function, and in the last several days she is had difficulties with urination, endorsing frequency but decreased urine output.  Endorses sharp left-sided chest pain for several days, as well as substernal chest pressure, symptoms are constant.  Denies fever or chills.    Review of patient's allergies indicates:   Allergen Reactions    Penicillins Hives    Iodinated contrast media Nausea And Vomiting    Oxycodone-acetaminophen Other (See Comments)     Nausea, Dizziness, Anxiety.  "I don't like how it makes me feel."   Given Hydromorphone 0.5mg IVP  Without problems.  Other reaction(s): Other (See Comments)    Diovan hct [valsartan-hydrochlorothiazide] Other (See Comments)     Causes coughing    Irinotecan      Pt has homozygosity for the TA7 promoter variant that places this individual at significantly increased risk for   severe neutropenia (grade 4) when treated with the standard dose of irinotecan (risk approximately 50%).   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 Metabolism of other drugs not listed here may also be impacted by UGT1A1 enzyme activity.       Tramadol Nausea And " Vomiting     Other reaction(s): Other (See Comments)     Past Medical History:   Diagnosis Date    Anemia     Anticoagulant long-term use     Arthritis     Atrial fibrillation     CKD (chronic kidney disease), stage IV 5/8/2018    Congestive heart failure     COPD (chronic obstructive pulmonary disease)     Deep vein thrombosis     elevated bilirubin d/t Gilbert's syndrome     confirmed by [place] genetic testing, evaluated by hepatology    Encounter for blood transfusion     GERD (gastroesophageal reflux disease)     Hypertension     Hypertensive cardiovascular-renal disease, stage 1-4 or unspecified chronic kidney disease, with heart failure 3/4/2022    Pheochromocytoma, malignant     Restrictive lung disease 4/26/2022    Right kidney mass     Sleep apnea     Thalassemia trait, alpha     Thyroid disease     Type 2 diabetes mellitus with hyperglycemia, without long-term current use of insulin 8/13/2020     Past Surgical History:   Procedure Laterality Date    APPENDECTOMY      BONE MARROW BIOPSY      CARDIAC DEFIBRILLATOR PLACEMENT Left 12/2016    CARDIAC ELECTROPHYSIOLOGY MAPPING AND ABLATION      CARDIAC ELECTROPHYSIOLOGY MAPPING AND ABLATION      COLONOSCOPY N/A 5/6/2022    Procedure: COLONOSCOPY;  Surgeon: Arely Betancourt MD;  Location: [facility] ([address] [place] [address]);  Service: Endoscopy;  Laterality: N/A;  heart transplant candidate/ EF 25% - 2nd floor/ defib - Biotronik - ERW  Eliquis - per Dr. Cortez with [facility], Pt ok to hold Eliquis x 2 days prior-see media tab-outside correspondence dated 12/30/21  - ERW  verbal instructions/portal instructions/email instructions - s    EYE SURGERY      due to running tears    FRACTURE SURGERY Left     hand 5th digit    HYSTERECTOMY      KNEE SURGERY Left 2016    hematoma    LIVER BIOPSY  10/24/2018    Minimal steatosis, predominantly macrovesicular, 1%, Minimal nonspecific portal inflammation, no fibrosis. No findings on biopsy to explain elevated bilirubin levels. Could be d/t  Gilbert's =?- hemolysis    RIGHT HEART CATHETERIZATION Right 2021    Procedure: INSERTION, CATHETER, RIGHT HEART;  Surgeon: Irving Cardenas MD;  Location: [facility] CATH LAB;  Service: Cardiology;  Laterality: Right;    TRANSJUGULAR BIOPSY OF LIVER N/A 10/24/2018    Procedure: BIOPSY, LIVER, TRANSJUGULAR APPROACH;  Surgeon: Carmen Diagnostic Provider;  Location: [facility] OR [address] [place] [address];  Service: Radiology;  Laterality: N/A;     Family History   Problem Relation Age of Onset    Cancer Mother         pancreatic CA early 50's    Heart disease Father          MI in late 50's    Hypertension Father     Heart attack Father     Heart disease Sister     Heart disease Brother     Cirrhosis Brother         alcoholic    Heart disease Sister     Heart disease Brother     Hypertension Brother     Diabetes Brother      Social History     Tobacco Use    Smoking status: Never    Smokeless tobacco: Never   Substance Use Topics    Alcohol use: Yes     Comment: up to 1 yr ago drank 2-3 drinks on occasion but sporadic    Drug use: No     Review of Systems   Constitutional:  Positive for fatigue. Negative for chills and fever.   HENT:  Negative for congestion, rhinorrhea and sore throat.    Eyes:  Negative for visual disturbance.        Neg vision changes   Respiratory:  Positive for cough, chest tightness and shortness of breath.    Cardiovascular:  Positive for chest pain. Negative for leg swelling.        + body swelling   Gastrointestinal:  Negative for abdominal pain, nausea and vomiting.        Neg changes in stool   Genitourinary:  Positive for decreased urine volume and frequency. Negative for dysuria, flank pain, pelvic pain and urgency.   Musculoskeletal:  Negative for back pain and myalgias.   Skin:  Negative for color change and rash.   Allergic/Immunologic: Negative for immunocompromised state.   Neurological:  Negative for dizziness, weakness, light-headedness and headaches.   Hematological:  Does not bruise/bleed easily.      Physical Exam     Initial Vitals [11/15/22 1636]   BP Pulse Resp Temp SpO2   (!) 173/100 (!) 111 (!) 24 98.1 °F (36.7 °C) 97 %      MAP       --         Physical Exam    Nursing note and vitals reviewed.  Constitutional: She appears well-developed and well-nourished. She is not diaphoretic. No distress.   HENT:   Head: Normocephalic and atraumatic.   Nose: Nose normal.   Eyes: Conjunctivae and EOM are normal. Pupils are equal, round, and reactive to light. No scleral icterus.   Neck: Neck supple. JVD present.   Normal range of motion.  Cardiovascular:  Regular rhythm and intact distal pulses.           tachycardic   Pulmonary/Chest: No stridor. No respiratory distress. She has no wheezes. She has no rhonchi. She has rales. She exhibits no tenderness.   Abdominal: Abdomen is soft. Bowel sounds are normal. She exhibits no distension. There is no abdominal tenderness.   Musculoskeletal:         General: Edema (abdomen and face) present. No tenderness. Normal range of motion.      Cervical back: Normal range of motion and neck supple.     Neurological: She is alert and oriented to person, place, and time. She has normal strength.   Skin: Skin is warm and dry. Capillary refill takes less than 2 seconds. No rash noted. No pallor.   Psychiatric: She has a normal mood and affect. Her behavior is normal. Judgment and thought content normal.       ED Course   Procedures  Labs Reviewed   CBC W/ AUTO DIFFERENTIAL - Abnormal; Notable for the following components:       Result Value    Hemoglobin 8.1 (*)     Hematocrit 27.9 (*)     MCV 61 (*)     MCH 17.6 (*)     MCHC 29.0 (*)     RDW 27.9 (*)     Immature Granulocytes 1.5 (*)     Immature Grans (Abs) 0.13 (*)     nRBC 7 (*)     Gran % 74.1 (*)     Lymph % 14.3 (*)     All other components within normal limits    Narrative:     Release to patient->Immediate   COMPREHENSIVE METABOLIC PANEL - Abnormal; Notable for the following components:    Glucose 159 (*)     BUN 40 (*)      Creatinine 2.4 (*)     Total Bilirubin 1.6 (*)     Alkaline Phosphatase 52 (*)     eGFR 23.0 (*)     All other components within normal limits    Narrative:     Release to patient->Immediate   TROPONIN I - Abnormal; Notable for the following components:    Troponin I 0.769 (*)     All other components within normal limits    Narrative:     Release to patient->Immediate   B-TYPE NATRIURETIC PEPTIDE - Abnormal; Notable for the following components:    BNP 4,104 (*)     All other components within normal limits    Narrative:     Release to patient->Immediate   HIV 1 / 2 ANTIBODY    Narrative:     Release to patient->Immediate   HEPATITIS C ANTIBODY    Narrative:     Release to patient->Immediate          Imaging Results              X-Ray Chest AP Portable (Final result)  Result time 11/15/22 18:14:13      Final result by Mao Davidson MD (11/15/22 18:14:13)                   Impression:      1. No significant change in cardiopulmonary status.  2. Cardiomegaly with probable mild pulmonary edema.  3. Increased density at the left lung base may be associated with pleural effusion and or adjacent airspace disease.  Follow-up recommended.      Electronically signed by: Mao Davidson  Date:    11/15/2022  Time:    18:14               Narrative:    EXAMINATION:  XR CHEST AP PORTABLE    CLINICAL HISTORY:  CHF;    TECHNIQUE:  Single frontal view of the chest was performed.    COMPARISON:  11/01/2022    FINDINGS:  Cardiac silhouette is enlarged.    Increased density at the left lung base with obscuration of the left hemidiaphragm is similar to the prior study may be associated with pleural effusion and or associated left basilar retrocardiac airspace disease.    Accentuation of the vascular markings with mild interstitial changes suggesting pulmonary edema.    No evidence of pneumothorax.  No acute osseous abnormality.    No significant change.                                       Medications - No data to display  Medical  Decision Making:   History:   Old Medical Records: I decided to obtain old medical records.  Old Records Summarized: records from clinic visits and records from previous admission(s).       <> Summary of Records: Seen by PCP today, has been taking Lasix 40 mg  Initial Assessment:   Pt appears volume overloaded although no hypoxia on 2L NC (home dose), faint rales on exam  Differential Diagnosis:   CHF exacerbation, renal failure, ACS, pleural effusion, PNA, electrolyte imbalance  Independently Interpreted Test(s):   I have ordered and independently interpreted X-rays - see summary below.       <> Summary of X-Ray Reading(s): Mild pulm edema  I have ordered and independently interpreted EKG Reading(s) - see summary below       <> Summary of EKG Reading(s): Sinus tachycardia rate 100, LAFB, LVH and   Clinical Tests:   Lab Tests: Ordered and Reviewed  Radiological Study: Ordered and Reviewed  Medical Tests: Ordered and Reviewed  ED Management:  See ED course    This is an acute presentation of an emergent condition with multiple comorbidities.  Pt given Lasix for CHF exacerbation, planned admission.    Complexity: High           ED Course as of 12/20/22 1816 Tue Nov 15, 2022   1742 Hemoglobin(!): 8.1  Decreased 1 unit from 2 weeks ago [JR]   1759 Troponin I(!): 0.769  improved [JR]   1822 Creatinine(!): 2.4  Slightly increased from 2.2 [JR]   1822 Pulse: 96 [JR]   1822 BP(!): 164/100 [JR]   1822 BNP(!): 4,104  Will give IV lasix [JR]   1958 BP(!): 139/95 [JR]      ED Course User Index  [JR] Lilly Roberts MD                 Clinical Impression:   Final diagnoses:  [R07.9] Chest pain  [R06.02] Shortness of breath  [I50.9] Acute on chronic congestive heart failure, unspecified heart failure type (Primary)        ED Disposition Condition    Observation Stable                Lilly Roberts MD  12/20/22 1825

## 2022-11-16 NOTE — H&P
[facility] - Emergency Dept  [facility] Medicine  History & Physical    Patient Name: Hafsa Hawley  MRN: [id number]  Patient Class: IP- Inpatient  Admission Date: 11/15/2022  Attending Physician: Constanza Barger MD   Primary Care Provider: Cristobal Ann MD         Patient information was obtained from patient, past medical records and ER records.     Subjective:     Principal Problem:<principal problem not specified>    Chief Complaint:   Chief Complaint   Patient presents with    Chest Pain    Shortness of Breath     Sent from clinic has aicd        HPI: 56 y/o F with h/o NICM, combined systolic and diastolic CHF (EF 25%, Grade II DD), s/p AICD, COPD (home 2 L's O2), pHTN, NIDDM (A1c 6.5) and permanent A-fib that [place] [facility] [place] shortness of breath since her recent discharge from the hospital. Of note, the patient reports that she was recently cut down from her 80 mg po QD dose to 40 mg po QD dose by her cardiologist. She has been dyspneic at rest and reports chronic cough that is productive of phlegm, however phlegm/sputum amount has not changed. Her oxygen requirement has not increased, but she does feel more short of breath at rest. She has been urinating frequently, but does think she has gained weight since her discharge- reports dry weight at 207 lbs. Unclear weight prior to arrival. She does report fevers, chills, sharp chest pain, urinary frequency, n/v, intermittent diarrhea mixed with periods of constipation and muscle pain.    She currently lives in a mobile home with her ex and her grandchild. Ambulates with a walker if walking outside, but does not need assistive devices in the mobile home as it is narrow and she can grasp her surroundings. She does not have much support at home.       Past Medical History:   Diagnosis Date    Anemia     Anticoagulant long-term use     Arthritis     Atrial fibrillation     CKD (chronic kidney disease), stage IV 5/8/2018    Congestive heart  failure     COPD (chronic obstructive pulmonary disease)     Deep vein thrombosis     elevated bilirubin d/t Gilbert's syndrome     confirmed by [facility] genetic testing, evaluated by hepatology    Encounter for blood transfusion     GERD (gastroesophageal reflux disease)     Hypertension     Hypertensive cardiovascular-renal disease, stage 1-4 or unspecified chronic kidney disease, with heart failure 3/4/2022    Pheochromocytoma, malignant     Restrictive lung disease 4/26/2022    Right kidney mass     Sleep apnea     Thalassemia trait, alpha     Thyroid disease     Type 2 diabetes mellitus with hyperglycemia, without long-term current use of insulin 8/13/2020       Past Surgical History:   Procedure Laterality Date    APPENDECTOMY      BONE MARROW BIOPSY      CARDIAC DEFIBRILLATOR PLACEMENT Left 12/2016    CARDIAC ELECTROPHYSIOLOGY MAPPING AND ABLATION      CARDIAC ELECTROPHYSIOLOGY MAPPING AND ABLATION      COLONOSCOPY N/A 5/6/2022    Procedure: COLONOSCOPY;  Surgeon: Arely Betancourt MD;  Location: [facility] ENDO ([address] [place] [address]);  Service: Endoscopy;  Laterality: N/A;  heart transplant candidate/ EF 25% - 2nd floor/ defib - Biotron[facility] - ERW  Eliquis - per Dr. Cortez with [facility] [place], Pt ok to hold Eliquis x 2 days prior-see media tab-outside correspondence dated 12/30/21  - ERW  verbal instructions/portal instructions/email instructions - s    EYE SURGERY      due to running tears    FRACTURE SURGERY Left     hand 5th digit    HYSTERECTOMY      KNEE SURGERY Left 2016    hematoma    LIVER BIOPSY  10/24/2018    Minimal steatosis, predominantly macrovesicular, 1%, Minimal nonspecific portal inflammation, no fibrosis. No findings on biopsy to explain elevated bilirubin levels. Could be d/t Gilbert's =?- hemolysis    RIGHT HEART CATHETERIZATION Right 12/07/2021    Procedure: INSERTION, CATHETER, RIGHT HEART;  Surgeon: Irving Cardenas MD;  Location: [facility] CATH LAB;  Service: Cardiology;  Laterality: Right;     "TRANSJUGULAR BIOPSY OF LIVER N/A 10/24/2018    Procedure: BIOPSY, LIVER, TRANSJUGULAR APPROACH;  Surgeon: Carmen Diagnostic Provider;  Location: [facility] OR [address] [place] [address];  Service: Radiology;  Laterality: N/A;       Review of patient's allergies indicates:   Allergen Reactions    Penicillins Hives    Iodinated contrast media Nausea And Vomiting    Oxycodone-acetaminophen Other (See Comments)     Nausea, Dizziness, Anxiety.  "I don't like how it makes me feel."   Given Hydromorphone 0.5mg IVP  Without problems.  Other reaction(s): Other (See Comments)    Diovan hct [valsartan-hydrochlorothiazide] Other (See Comments)     Causes coughing    Irinotecan      Pt has homozygosity for the TA7 promoter variant that places this individual at significantly increased risk for   severe neutropenia (grade 4) when treated with the standard dose of irinotecan (risk approximately 50%).   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 Metabolism of other drugs not listed here may also be impacted by UGT1A1 enzyme activity.       Tramadol Nausea And Vomiting     Other reaction(s): Other (See Comments)       Current Facility-Administered Medications on File Prior to Encounter   Medication    0.9%  NaCl infusion    vancomycin in dextrose 5 % 1 gram/250 mL IVPB 1,000 mg     Current Outpatient Medications on File Prior to Encounter   Medication Sig    albuterol-ipratropium (DUO-NEB) 2.5 mg-0.5 mg/3 mL nebulizer solution Take 3 mLs by nebulization 2 (two) times a day.    allopurinoL (ZYLOPRIM) 100 MG tablet Take 1 tablet (100 mg total) by mouth once daily.    ALPRAZolam (XANAX) 2 MG Tab Take 2 mg by mouth 2 (two) times daily as needed.    amiodarone (PACERONE) 200 MG Tab Take 1 tablet (200 mg total) by mouth once daily.    apixaban (ELIQUIS) 2.5 mg Tab Take 1 tablet (2.5 mg total) by mouth 2 (two) times a day. Decrease " in dose    atorvastatin (LIPITOR) 40 MG tablet Take 1 tablet (40 mg total) by mouth every evening.    b complex vitamins tablet Take 1 tablet by mouth once daily.    blood sugar diagnostic (ACCU-CHEK GUIDE TEST STRIPS) Strp 1 strip by Other route 3 (three) times daily.    blood-glucose meter kit Use as instructed    diclofenac sodium (VOLTAREN) 1 % Gel APPLY 2 G TOPICALLY 3 (THREE) TIMES DAILY AS NEEDED (PAIN).    diltiazem HCl (DILTIAZEM 2% CREAM) Apply topically 3 (three) times daily. Apply topically to anal area.    empagliflozin (JARDIANCE) 25 mg tablet Jardiance 25 mg tablet    ergocalciferol (ERGOCALCIFEROL) 50,000 unit Cap Take 1 capsule (50,000 Units total) by mouth every 7 days.    ferrous sulfate 325 (65 FE) MG EC tablet Take 1 tablet (325 mg total) by mouth once daily.    fluticasone propionate (FLONASE) 50 mcg/actuation nasal spray 2 sprays by Each Nostril route daily as needed for Rhinitis.     furosemide (LASIX) 80 MG tablet Take 0.5 tablets (40 mg total) by mouth once daily. Use afternoon dose if needed    glimepiride (AMARYL) 2 MG tablet TAKE 1 TABLET BY MOUTH EVERY DAY WITH BREAKFAST    hydrocortisone 2.5 % cream APPLY TOPICALLY 2 (TWO) TIMES DAILY AS NEEDED (HEMORRHOIDS).    lancets (ACCU-CHEK SOFTCLIX LANCETS) Misc 1 Device by Misc.(Non-Drug; Combo Route) route 3 (three) times daily.    LIDOcaine (LIDODERM) 5 % Place 1 patch onto the skin once daily. Remove & Discard patch within 12 hours or as directed by MD    linaCLOtide (LINZESS) 145 mcg Cap capsule Take 1 capsule (145 mcg total) by mouth before breakfast. Hold if diarrhea    metoprolol succinate (TOPROL-XL) 100 MG 24 hr tablet Take 100 mg by mouth once daily.    mometasone-formoterol (DULERA) 200-5 mcg/actuation inhaler Inhale 2 puffs into the lungs 2 (two) times daily. Controller    montelukast (SINGULAIR) 10 mg tablet Take 1 tablet every day by oral route for 30 days.    NITROSTAT 0.4 mg SL tablet Take 2.5 tablets (1 mg  total) by mouth every 5 (five) minutes as needed for Chest pain. No more than 3 tablets in 15 minutes.    nystatin (MYCOSTATIN) powder Apply topically 2 (two) times daily.    olopatadine (PATADAY) 0.2 % Drop Instill 1 drop into both eyes once daily    ondansetron (ZOFRAN-ODT) 8 MG TbDL Take 8 mg by mouth every 8 (eight) hours as needed.    pantoprazole (PROTONIX) 40 MG tablet TAKE 1 TABLET BY MOUTH DAILY IN THE MORNING    polyethylene glycol (GLYCOLAX) 17 gram PwPk Take 17 g by mouth 3 (three) times daily as needed (constipation/hard stools).    promethazine-codeine 6.25-10 mg/5 ml (PHENERGAN WITH CODEINE) 6.25-10 mg/5 mL syrup TAKE 5 mLs BY MOUTH EVERY 4 - 6 HOURS AS NEEDED    rOPINIRole (REQUIP) 4 MG tablet Take 1 tablet (4 mg total) by mouth once daily. Pt taking 4mg daily    sacubitriL-valsartan (ENTRESTO)  mg per tablet Take 1 tablet by mouth 2 (two) times daily.    scopolamine (TRANSDERM-SCOP) 1.3-1.5 mg (1 mg over 3 days) SMARTSIG:Patch(s) T-DERMAL Every 3 Days Strength: 1.3-1.5 MG over 3 days)    SPIRIVA WITH HANDIHALER 18 mcg inhalation capsule INHALE 1 CAPSULE INTO THE LUNGS ONCE DAILY.    VENTOLIN HFA 90 mcg/actuation inhaler Inhale 2 puffs into the lungs 2 (two) times daily as needed.     walker Misc 1 Device by Misc.(Non-Drug; Combo Route) route as needed.    [DISCONTINUED] DULoxetine (CYMBALTA) 30 MG capsule Take 1 capsule (30 mg total) by mouth once daily.     Family History       Problem Relation (Age of Onset)    Cancer Mother    Cirrhosis Brother    Diabetes Brother    Heart attack Father    Heart disease Father, Sister, Brother, Sister, Brother    Hypertension Father, Brother          Tobacco Use    Smoking status: Never    Smokeless tobacco: Never   Substance and Sexual Activity    Alcohol use: Yes     Comment: up to 1 yr ago drank 2-3 drinks on occasion but sporadic    Drug use: No    Sexual activity: Yes     Partners: Male     Review of Systems   Constitutional:   Positive for fatigue and fever (subjective, none recorded). Negative for chills.   HENT:  Negative for rhinorrhea and sore throat.    Respiratory:  Positive for cough (chronic) and shortness of breath.    Cardiovascular:  Positive for chest pain (sharp, left sided, intermittent, none during my eval) and leg swelling.   Gastrointestinal:  Positive for constipation, diarrhea, nausea and vomiting. Negative for abdominal pain.   Genitourinary:  Positive for frequency and urgency. Negative for dysuria.   Musculoskeletal:  Positive for neck pain. Negative for myalgias.   Skin:  Negative for rash and wound.   Neurological:  Positive for dizziness. Negative for headaches.   Psychiatric/Behavioral:  Negative for agitation and confusion.    Objective:     Vital Signs (Most Recent):  Temp: 98.1 °F (36.7 °C) (11/15/22 1636)  Pulse: 86 (11/15/22 2242)  Resp: 18 (11/15/22 2242)  BP: (!) 154/99 (11/15/22 2130)  SpO2: 100 % (11/15/22 2242)   Vital Signs (24h Range):  Temp:  [98.1 °F (36.7 °C)] 98.1 °F (36.7 °C)  Pulse:  [] 86  Resp:  [18-28] 18  SpO2:  [93 %-100 %] 100 %  BP: (124-173)/() 154/99     Weight: 93.9 kg (207 lb)  Body mass index is 33.41 kg/m².    Physical Exam  Vitals and nursing note reviewed.   Constitutional:       General: She is not in acute distress.     Appearance: She is not ill-appearing or toxic-appearing.   HENT:      Head: Normocephalic and atraumatic.      Nose: Nose normal.   Eyes:      General: No scleral icterus.     Extraocular Movements: Extraocular movements intact.   Neck:      Comments: + JVD   +HJR  Cardiovascular:      Rate and Rhythm: Normal rate and regular rhythm.      Pulses: Normal pulses.   Pulmonary:      Effort: Pulmonary effort is normal. No respiratory distress.      Breath sounds: Rales present. No wheezing.      Comments: On 2 L's NC   Abdominal:      General: Abdomen is flat. Bowel sounds are normal. There is no distension.      Palpations: Abdomen is soft.       Tenderness: There is no abdominal tenderness. There is no guarding.   Musculoskeletal:         General: Swelling present.      Right lower leg: Edema (1+) present.      Left lower leg: Edema (1+) present.   Skin:     General: Skin is warm and dry.      Coloration: Skin is not jaundiced or pale.      Findings: No lesion or rash.   Neurological:      Mental Status: She is alert and oriented to person, place, and time. Mental status is at baseline.   Psychiatric:         Mood and Affect: Mood normal.           Significant Labs: All pertinent labs within the past 24 hours have been reviewed.    Significant Imaging: I have reviewed all pertinent imaging results/findings within the past 24 hours.    Assessment/Plan:     Acute on chronic combined systolic and diastolic heart failure  Last EF 25%. Grade II DD  Clinically moderately volume overloaded   She is on her home O2, suspect quick turnaround, BNP likely elevated in setting of home Entresto      - Diuresis with Lasix 80 mg IV QD, transition to po as able    - Continue GDMT   - Not a great candidate for aldactone given Cr near 2.5     [place] (acute kidney injury)  Baseline Creatinine: 1.8-2.1  - Creatinine on admission: 2.4  - Urine lytes (Urine Na, Urine Cr, Urine Urea if on diuretic) and UA ordered   - Consider US of the retroperitoneum  - PRN Bladder Scans ordered    Acute on chronic respiratory failure with hypoxia  Home O2- 2 L's, currently at baseline, however moderately volume overloaded with [place] likely 2/2 cardiorenal   - Diurese and trend kidney function     Type 2 diabetes mellitus without complication, without long-term current use of insulin  - Home- Jardiance and Glimepiride  - LDSSI  - POCT checks ordered     elevated bilirubin d/t Gilbert's syndrome  - Monitor CMP       ICD (implantable cardioverter-defibrillator) in place  - Tele       Depression due to physical illness  Home Med- Cymbalta for chronic medical illnesses, not compliant at present        Paroxysmal atrial fibrillation  - Continue amio  - Continue Toprol   - Increase Apixaban to correct dose (was on 2.5 mg BID, unclear why as she only meets 1/3 criteria for dose reduction)- may be worth investigating further, denies any GI bleeding but rather occasional bruising       MELISSA (obstructive sleep apnea)  - CPAP ordered     Essential hypertension  Continue home meds     Elevated troponin  - Likely elevated in setting of CKD- repeat and trend to peak       VTE Risk Mitigation (From admission, onward)         Ordered     apixaban tablet 5 mg  2 times daily         11/15/22 2229     Reason for No Pharmacological VTE Prophylaxis  Once        Question:  Reasons:  Answer:  Physician Provided (leave comment)  Comment:  apixaban    11/15/22 2227     IP VTE HIGH RISK PATIENT  Once         11/15/22 2227     Place sequential compression device  Until discontinued         11/15/22 2227     Place sequential compression device  Until discontinued         11/15/22 2111                   Lisa Hardin DO  Department of Hospital Medicine   [facility] - Emergency Dept

## 2022-11-16 NOTE — HPI
58 y/o F with h/o NICM, combined systolic and diastolic CHF (EF 25%, Grade II DD), s/p AICD, COPD (home 2 L's O2), pHTN, NIDDM (A1c 6.5) and permanent A-fib that presents to [facility] with increasing shortness of breath since her recent discharge from the hospital. Of note, the patient reports that she was recently cut down from her 80 mg po QD dose to 40 mg po QD dose by her cardiologist. She has been dyspneic at rest and reports chronic cough that is productive of phlegm, however phlegm/sputum amount has not changed. Her oxygen requirement has not increased, but she does feel more short of breath at rest. She has been urinating frequently, but does think she has gained weight since her discharge- reports dry weight at 207 lbs. Unclear weight prior to arrival. She does report fevers, chills, sharp chest pain, urinary frequency, n/v, intermittent diarrhea mixed with periods of constipation and muscle pain.    She currently lives in a mobile home with her ex and her grandchild. Ambulates with a walker if walking outside, but does not need assistive devices in the mobile home as it is narrow and she can grasp her surroundings. She does not have much support at home.

## 2022-11-16 NOTE — ASSESSMENT & PLAN NOTE
Last EF 25%. Grade II DD  Clinically moderately volume overloaded   She is on her home O2, suspect quick turnaround, BNP likely elevated in setting of home Entresto      - Diuresis with Lasix 80 mg IV QD, transition to po as able    - Continue GDMT   - Not a great candidate for aldactone given Cr near 2.5   11/16 - trend troponin 0.7 x two. will consult cardiology.  Appreciate recs. F/u Dr. Javier- has appoint ment next week. Wants heart/ kidney transplant eval

## 2022-11-16 NOTE — ASSESSMENT & PLAN NOTE
Baseline Creatinine: 1.8-2.1  - Creatinine on admission: 2.4  - Urine lytes (Urine Na, Urine Cr, Urine Urea if on diuretic) and UA ordered   - Consider US of the retroperitoneum  - PRN Bladder Scans ordered    11/16- cr 2.4-> 2.1 after IV laasix

## 2022-11-16 NOTE — HOSPITAL COURSE
11/16- /90   Pulse 83 . On lasix 80 iv bid, on metoprolol, entresto, amiodorone, . Cr 2.4 0> 2.1, trop 0.7. UO- not recorded. Producing.    She is asking about a heart transplant. Apparently was declined in the past and she does not understand why. she is tearful and upset. Will ask cardiology to ome talk to her and see what can be done. F/u heart tp clinic

## 2022-11-16 NOTE — ASSESSMENT & PLAN NOTE
Troponin remains flat at around 0.7-0.8. Likely secondary to CKD, chronic dilated cardiomyopathy, and demand ischemia secondary to HF.    RECOMMENDATIONS  Trend troponin to peak

## 2022-11-16 NOTE — ED NOTES
Bed: EDOU09  Expected date: 11/15/22  Expected time: 10:28 PM  Means of arrival:   Comments:  Chandan

## 2022-11-16 NOTE — ASSESSMENT & PLAN NOTE
Baseline Creatinine: 1.8-2.1  - Creatinine on admission: 2.4  - Urine lytes (Urine Na, Urine Cr, Urine Urea if on diuretic) and UA ordered   - Consider US of the retroperitoneum  - PRN Bladder Scans ordered

## 2022-11-17 ENCOUNTER — TELEPHONE (OUTPATIENT)
Dept: TRANSPLANT | Facility: CLINIC | Age: 57
End: 2022-11-17
Payer: MEDICAID

## 2022-11-17 ENCOUNTER — TELEPHONE (OUTPATIENT)
Dept: FAMILY MEDICINE | Facility: CLINIC | Age: 57
End: 2022-11-17
Payer: MEDICAID

## 2022-11-17 LAB
ALBUMIN SERPL BCP-MCNC: 3.1 G/DL (ref 3.5–5.2)
ALP SERPL-CCNC: 44 U/L (ref 55–135)
ALT SERPL W/O P-5'-P-CCNC: 24 U/L (ref 10–44)
ANION GAP SERPL CALC-SCNC: 11 MMOL/L (ref 8–16)
ANION GAP SERPL CALC-SCNC: 9 MMOL/L (ref 8–16)
ANISOCYTOSIS BLD QL SMEAR: ABNORMAL
ASCENDING AORTA: 3.56 CM
AST SERPL-CCNC: 13 U/L (ref 10–40)
AV INDEX (PROSTH): 0.76
AV MEAN GRADIENT: 6 MMHG
AV PEAK GRADIENT: 10 MMHG
AV VALVE AREA: 2.55 CM2
AV VELOCITY RATIO: 0.72
BASOPHILS # BLD AUTO: 0.02 K/UL (ref 0–0.2)
BASOPHILS NFR BLD: 0.3 % (ref 0–1.9)
BILIRUB SERPL-MCNC: 1.8 MG/DL (ref 0.1–1)
BSA FOR ECHO PROCEDURE: 2.16 M2
BUN SERPL-MCNC: 46 MG/DL (ref 6–20)
BUN SERPL-MCNC: 46 MG/DL (ref 6–20)
CALCIUM SERPL-MCNC: 8.9 MG/DL (ref 8.7–10.5)
CALCIUM SERPL-MCNC: 8.9 MG/DL (ref 8.7–10.5)
CHLORIDE SERPL-SCNC: 103 MMOL/L (ref 95–110)
CHLORIDE SERPL-SCNC: 107 MMOL/L (ref 95–110)
CO2 SERPL-SCNC: 24 MMOL/L (ref 23–29)
CO2 SERPL-SCNC: 25 MMOL/L (ref 23–29)
CREAT SERPL-MCNC: 2.2 MG/DL (ref 0.5–1.4)
CREAT SERPL-MCNC: 2.3 MG/DL (ref 0.5–1.4)
CV ECHO LV RWT: 0.34 CM
DIFFERENTIAL METHOD: ABNORMAL
DOP CALC AO PEAK VEL: 1.56 M/S
DOP CALC AO VTI: 22.91 CM
DOP CALC LVOT AREA: 3.4 CM2
DOP CALC LVOT DIAMETER: 2.07 CM
DOP CALC LVOT PEAK VEL: 1.12 M/S
DOP CALC LVOT STROKE VOLUME: 58.49 CM3
DOP CALCLVOT PEAK VEL VTI: 17.39 CM
E WAVE DECELERATION TIME: 194.33 MSEC
E/A RATIO: 0.98
E/E' RATIO: 19.6 M/S
ECHO LV POSTERIOR WALL: 1.25 CM (ref 0.6–1.1)
EJECTION FRACTION: 10 %
EOSINOPHIL # BLD AUTO: 0.2 K/UL (ref 0–0.5)
EOSINOPHIL NFR BLD: 3.2 % (ref 0–8)
ERYTHROCYTE [DISTWIDTH] IN BLOOD BY AUTOMATED COUNT: 27.9 % (ref 11.5–14.5)
EST. GFR  (NO RACE VARIABLE): 24.2 ML/MIN/1.73 M^2
EST. GFR  (NO RACE VARIABLE): 25.5 ML/MIN/1.73 M^2
FRACTIONAL SHORTENING: 8 % (ref 28–44)
GLUCOSE SERPL-MCNC: 150 MG/DL (ref 70–110)
GLUCOSE SERPL-MCNC: 163 MG/DL (ref 70–110)
HCT VFR BLD AUTO: 26.1 % (ref 37–48.5)
HGB BLD-MCNC: 7.8 G/DL (ref 12–16)
HYPOCHROMIA BLD QL SMEAR: ABNORMAL
IMM GRANULOCYTES # BLD AUTO: 0.14 K/UL (ref 0–0.04)
IMM GRANULOCYTES NFR BLD AUTO: 1.8 % (ref 0–0.5)
INTERVENTRICULAR SEPTUM: 1.29 CM (ref 0.6–1.1)
LA MAJOR: 7.85 CM
LA MINOR: 8.08 CM
LA WIDTH: 5.09 CM
LEFT ATRIUM SIZE: 5.86 CM
LEFT ATRIUM VOLUME INDEX MOD: 80.7 ML/M2
LEFT ATRIUM VOLUME INDEX: 96.6 ML/M2
LEFT ATRIUM VOLUME MOD: 168.66 CM3
LEFT ATRIUM VOLUME: 201.9 CM3
LEFT INTERNAL DIMENSION IN SYSTOLE: 6.7 CM (ref 2.1–4)
LEFT VENTRICLE DIASTOLIC VOLUME INDEX: 133.15 ML/M2
LEFT VENTRICLE DIASTOLIC VOLUME: 278.29 ML
LEFT VENTRICLE MASS INDEX: 223 G/M2
LEFT VENTRICLE SYSTOLIC VOLUME INDEX: 110.7 ML/M2
LEFT VENTRICLE SYSTOLIC VOLUME: 231.43 ML
LEFT VENTRICULAR INTERNAL DIMENSION IN DIASTOLE: 7.27 CM (ref 3.5–6)
LEFT VENTRICULAR MASS: 466.38 G
LV LATERAL E/E' RATIO: 19.6 M/S
LV SEPTAL E/E' RATIO: 19.6 M/S
LYMPHOCYTES # BLD AUTO: 1.4 K/UL (ref 1–4.8)
LYMPHOCYTES NFR BLD: 19 % (ref 18–48)
MAGNESIUM SERPL-MCNC: 1.7 MG/DL (ref 1.6–2.6)
MAGNESIUM SERPL-MCNC: 1.8 MG/DL (ref 1.6–2.6)
MCH RBC QN AUTO: 17.9 PG (ref 27–31)
MCHC RBC AUTO-ENTMCNC: 29.9 G/DL (ref 32–36)
MCV RBC AUTO: 60 FL (ref 82–98)
MONOCYTES # BLD AUTO: 0.6 K/UL (ref 0.3–1)
MONOCYTES NFR BLD: 7.7 % (ref 4–15)
MV PEAK A VEL: 1 M/S
MV PEAK E VEL: 0.98 M/S
MV STENOSIS PRESSURE HALF TIME: 56.36 MS
MV VALVE AREA P 1/2 METHOD: 3.9 CM2
NEUTROPHILS # BLD AUTO: 5.2 K/UL (ref 1.8–7.7)
NEUTROPHILS NFR BLD: 68 % (ref 38–73)
NRBC BLD-RTO: 6 /100 WBC
OVALOCYTES BLD QL SMEAR: ABNORMAL
PHOSPHATE SERPL-MCNC: 3.7 MG/DL (ref 2.7–4.5)
PISA MRMAX VEL: 0.05 M/S
PISA TR MAX VEL: 3.55 M/S
PLATELET # BLD AUTO: 196 K/UL (ref 150–450)
PMV BLD AUTO: ABNORMAL FL (ref 9.2–12.9)
POCT GLUCOSE: 145 MG/DL (ref 70–110)
POCT GLUCOSE: 161 MG/DL (ref 70–110)
POCT GLUCOSE: 164 MG/DL (ref 70–110)
POCT GLUCOSE: 177 MG/DL (ref 70–110)
POIKILOCYTOSIS BLD QL SMEAR: SLIGHT
POLYCHROMASIA BLD QL SMEAR: ABNORMAL
POTASSIUM SERPL-SCNC: 3.6 MMOL/L (ref 3.5–5.1)
POTASSIUM SERPL-SCNC: 3.7 MMOL/L (ref 3.5–5.1)
PROT SERPL-MCNC: 5.6 G/DL (ref 6–8.4)
RA MAJOR: 6.63 CM
RA PRESSURE: 15 MMHG
RA WIDTH: 2.91 CM
RBC # BLD AUTO: 4.35 M/UL (ref 4–5.4)
RIGHT VENTRICULAR END-DIASTOLIC DIMENSION: 3.84 CM
SCHISTOCYTES BLD QL SMEAR: ABNORMAL
SINUS: 3.07 CM
SODIUM SERPL-SCNC: 138 MMOL/L (ref 136–145)
SODIUM SERPL-SCNC: 141 MMOL/L (ref 136–145)
SPHEROCYTES BLD QL SMEAR: ABNORMAL
STJ: 2.9 CM
TARGETS BLD QL SMEAR: ABNORMAL
TDI LATERAL: 0.05 M/S
TDI SEPTAL: 0.05 M/S
TDI: 0.05 M/S
TR MAX PG: 50 MMHG
TRICUSPID ANNULAR PLANE SYSTOLIC EXCURSION: 1.75 CM
TROPONIN I SERPL DL<=0.01 NG/ML-MCNC: 0.71 NG/ML (ref 0–0.03)
TV REST PULMONARY ARTERY PRESSURE: 65 MMHG
WBC # BLD AUTO: 7.57 K/UL (ref 3.9–12.7)

## 2022-11-17 PROCEDURE — 63600175 PHARM REV CODE 636 W HCPCS: Performed by: HOSPITALIST

## 2022-11-17 PROCEDURE — 80048 BASIC METABOLIC PNL TOTAL CA: CPT | Mod: XB | Performed by: HOSPITALIST

## 2022-11-17 PROCEDURE — 25000003 PHARM REV CODE 250: Performed by: INTERNAL MEDICINE

## 2022-11-17 PROCEDURE — 36415 COLL VENOUS BLD VENIPUNCTURE: CPT | Performed by: HOSPITALIST

## 2022-11-17 PROCEDURE — 25000242 PHARM REV CODE 250 ALT 637 W/ HCPCS: Performed by: STUDENT IN AN ORGANIZED HEALTH CARE EDUCATION/TRAINING PROGRAM

## 2022-11-17 PROCEDURE — 94660 CPAP INITIATION&MGMT: CPT

## 2022-11-17 PROCEDURE — 25000003 PHARM REV CODE 250: Performed by: HOSPITALIST

## 2022-11-17 PROCEDURE — 99233 SBSQ HOSP IP/OBS HIGH 50: CPT | Mod: ,,, | Performed by: INTERNAL MEDICINE

## 2022-11-17 PROCEDURE — 25500020 PHARM REV CODE 255: Performed by: INTERNAL MEDICINE

## 2022-11-17 PROCEDURE — 94640 AIRWAY INHALATION TREATMENT: CPT

## 2022-11-17 PROCEDURE — 84100 ASSAY OF PHOSPHORUS: CPT | Performed by: STUDENT IN AN ORGANIZED HEALTH CARE EDUCATION/TRAINING PROGRAM

## 2022-11-17 PROCEDURE — 11000001 HC ACUTE MED/SURG PRIVATE ROOM

## 2022-11-17 PROCEDURE — 80053 COMPREHEN METABOLIC PANEL: CPT | Performed by: STUDENT IN AN ORGANIZED HEALTH CARE EDUCATION/TRAINING PROGRAM

## 2022-11-17 PROCEDURE — 99232 SBSQ HOSP IP/OBS MODERATE 35: CPT | Mod: ,,, | Performed by: INTERNAL MEDICINE

## 2022-11-17 PROCEDURE — 83735 ASSAY OF MAGNESIUM: CPT | Performed by: HOSPITALIST

## 2022-11-17 PROCEDURE — 94761 N-INVAS EAR/PLS OXIMETRY MLT: CPT

## 2022-11-17 PROCEDURE — 84484 ASSAY OF TROPONIN QUANT: CPT | Performed by: HOSPITALIST

## 2022-11-17 PROCEDURE — 25000003 PHARM REV CODE 250: Performed by: STUDENT IN AN ORGANIZED HEALTH CARE EDUCATION/TRAINING PROGRAM

## 2022-11-17 PROCEDURE — 83735 ASSAY OF MAGNESIUM: CPT | Mod: 91 | Performed by: STUDENT IN AN ORGANIZED HEALTH CARE EDUCATION/TRAINING PROGRAM

## 2022-11-17 PROCEDURE — 36415 COLL VENOUS BLD VENIPUNCTURE: CPT | Performed by: STUDENT IN AN ORGANIZED HEALTH CARE EDUCATION/TRAINING PROGRAM

## 2022-11-17 PROCEDURE — 99900035 HC TECH TIME PER 15 MIN (STAT)

## 2022-11-17 PROCEDURE — 85025 COMPLETE CBC W/AUTO DIFF WBC: CPT | Performed by: STUDENT IN AN ORGANIZED HEALTH CARE EDUCATION/TRAINING PROGRAM

## 2022-11-17 PROCEDURE — 27000221 HC OXYGEN, UP TO 24 HOURS

## 2022-11-17 PROCEDURE — 99233 PR SUBSEQUENT HOSPITAL CARE,LEVL III: ICD-10-PCS | Mod: ,,, | Performed by: INTERNAL MEDICINE

## 2022-11-17 PROCEDURE — 99232 PR SUBSEQUENT HOSPITAL CARE,LEVL II: ICD-10-PCS | Mod: ,,, | Performed by: INTERNAL MEDICINE

## 2022-11-17 RX ADMIN — AMIODARONE HYDROCHLORIDE 200 MG: 200 TABLET ORAL at 09:11

## 2022-11-17 RX ADMIN — FLUTICASONE PROPIONATE 100 MCG: 50 SPRAY, METERED NASAL at 11:11

## 2022-11-17 RX ADMIN — PANTOPRAZOLE SODIUM 40 MG: 40 TABLET, DELAYED RELEASE ORAL at 09:11

## 2022-11-17 RX ADMIN — FERROUS SULFATE TAB 325 MG (65 MG ELEMENTAL FE) 1 EACH: 325 (65 FE) TAB at 09:11

## 2022-11-17 RX ADMIN — ATORVASTATIN CALCIUM 40 MG: 40 TABLET, FILM COATED ORAL at 08:11

## 2022-11-17 RX ADMIN — SACUBITRIL AND VALSARTAN 1 TABLET: 97; 103 TABLET, FILM COATED ORAL at 11:11

## 2022-11-17 RX ADMIN — APIXABAN 5 MG: 5 TABLET, FILM COATED ORAL at 09:11

## 2022-11-17 RX ADMIN — FUROSEMIDE 80 MG: 10 INJECTION, SOLUTION INTRAMUSCULAR; INTRAVENOUS at 10:11

## 2022-11-17 RX ADMIN — APIXABAN 5 MG: 5 TABLET, FILM COATED ORAL at 08:11

## 2022-11-17 RX ADMIN — GUAIFENESIN AND DEXTROMETHORPHAN HYDROBROMIDE 1 TABLET: 600; 30 TABLET, EXTENDED RELEASE ORAL at 08:11

## 2022-11-17 RX ADMIN — FLUTICASONE FUROATE AND VILANTEROL TRIFENATATE 1 PUFF: 200; 25 POWDER RESPIRATORY (INHALATION) at 10:11

## 2022-11-17 RX ADMIN — HUMAN ALBUMIN MICROSPHERES AND PERFLUTREN 0.66 MG: 10; .22 INJECTION, SOLUTION INTRAVENOUS at 02:11

## 2022-11-17 RX ADMIN — MONTELUKAST 10 MG: 10 TABLET, FILM COATED ORAL at 09:11

## 2022-11-17 RX ADMIN — GUAIFENESIN AND DEXTROMETHORPHAN HYDROBROMIDE 1 TABLET: 600; 30 TABLET, EXTENDED RELEASE ORAL at 09:11

## 2022-11-17 RX ADMIN — ISOSORBIDE DINITRATE: 20 TABLET ORAL at 08:11

## 2022-11-17 RX ADMIN — SACUBITRIL AND VALSARTAN 1 TABLET: 97; 103 TABLET, FILM COATED ORAL at 08:11

## 2022-11-17 RX ADMIN — IPRATROPIUM BROMIDE AND ALBUTEROL SULFATE 3 ML: 2.5; .5 SOLUTION RESPIRATORY (INHALATION) at 10:11

## 2022-11-17 RX ADMIN — ALLOPURINOL 100 MG: 100 TABLET ORAL at 09:11

## 2022-11-17 RX ADMIN — FUROSEMIDE 80 MG: 10 INJECTION, SOLUTION INTRAMUSCULAR; INTRAVENOUS at 09:11

## 2022-11-17 RX ADMIN — METOPROLOL SUCCINATE 100 MG: 100 TABLET, EXTENDED RELEASE ORAL at 08:11

## 2022-11-17 RX ADMIN — IPRATROPIUM BROMIDE AND ALBUTEROL SULFATE 3 ML: 2.5; .5 SOLUTION RESPIRATORY (INHALATION) at 08:11

## 2022-11-17 RX ADMIN — ISOSORBIDE DINITRATE: 20 TABLET ORAL at 04:11

## 2022-11-17 RX ADMIN — TIOTROPIUM BROMIDE INHALATION SPRAY 2 PUFF: 3.12 SPRAY, METERED RESPIRATORY (INHALATION) at 10:11

## 2022-11-17 NOTE — PROGRESS NOTES
[facility] - [facility]  Cardiology  Progress Note    Patient Name: Hafsa Hawley  MRN: [id number]  Admission Date: 11/15/2022  Hospital Length of Stay: 2 days  Code Status: Full Code   Attending Physician: Karyn Parrish MD   Primary Care Physician: Cristobal Ann MD  Expected Discharge Date: 11/18/2022  Principal Problem:Acute on chronic respiratory failure with hypoxia    Subjective:     Interval History: No acute events overnight, afebrile, hemodynamically stable. She reports some improvements in her HF exacerbation symptoms.       Review of Systems   Constitutional: Positive for malaise/fatigue. Negative for chills and fever.   HENT:  Negative for congestion and sore throat.    Eyes:  Negative for vision loss in left eye and vision loss in right eye.   Cardiovascular:  Positive for chest pain (bilateral when coughing, left sided at rest, partially improves with Nitroglycerin), dyspnea on exertion (improving), leg swelling (mild) and palpitations.   Respiratory:  Positive for cough and shortness of breath.    Musculoskeletal:  Negative for falls and muscle cramps.   Gastrointestinal:  Positive for abdominal pain and nausea. Negative for constipation, diarrhea and vomiting.   Genitourinary:  Positive for frequency (on diuresis). Negative for incomplete emptying.   Neurological:  Negative for headaches and light-headedness.   Objective:     Vital Signs (Most Recent):  Temp: 96.4 °F (35.8 °C) (11/17/22 1122)  Pulse: 72 (11/17/22 1255)  Resp: 20 (11/17/22 1122)  BP: 129/83 (11/17/22 1152)  SpO2: (!) 92 % (11/17/22 1122) Vital Signs (24h Range):  Temp:  [96.4 °F (35.8 °C)-98 °F (36.7 °C)] 96.4 °F (35.8 °C)  Pulse:  [64-89] 72  Resp:  [16-20] 20  SpO2:  [92 %-97 %] 92 %  BP: (113-146)/(64-84) 129/83     Weight: 100.4 kg (221 lb 5.5 oz)  Body mass index is 35.73 kg/m².     SpO2: (!) 92 %  O2 Device (Oxygen Therapy): nasal cannula      Intake/Output Summary (Last 24 hours) at 11/17/2022 1326  Last data filed at 11/17/2022  0438  Gross per 24 hour   Intake --   Output 3000 ml   Net -3000 ml       Lines/Drains/Airways       Peripheral Intravenous Line  Duration                  Peripheral IV - Single Lumen 11/15/22 1659 20 G Right Antecubital 1 day                    Physical Exam  Vitals and nursing note reviewed.   Constitutional:       General: She is not in acute distress.     Appearance: Normal appearance. She is obese. She is not ill-appearing, toxic-appearing or diaphoretic.      Interventions: Nasal cannula in place.   HENT:      Head: Normocephalic and atraumatic.      Mouth/Throat:      Mouth: Mucous membranes are moist.   Eyes:      General: No scleral icterus.        Right eye: No discharge.         Left eye: No discharge.   Neck:      Vascular: JVD present.   Cardiovascular:      Rate and Rhythm: Normal rate and regular rhythm.      Heart sounds: Normal heart sounds.   Pulmonary:      Effort: Pulmonary effort is normal. No respiratory distress.      Breath sounds: Normal breath sounds.   Abdominal:      General: Abdomen is flat. Bowel sounds are normal. There is distension.      Palpations: Abdomen is soft.      Tenderness: There is no abdominal tenderness.   Musculoskeletal:         General: Swelling present. No tenderness.      Cervical back: Normal range of motion. No rigidity.      Right lower leg: Edema (mild) present.      Left lower leg: Edema (mild) present.   Skin:     General: Skin is warm and dry.   Neurological:      Mental Status: She is alert and oriented to person, place, and time. Mental status is at baseline.   Psychiatric:         Mood and Affect: Mood normal.         Behavior: Behavior normal.           LABS:  Recent Labs   Lab 11/16/22  0351 11/17/22  0055 11/17/22  0410    138 141   K 3.9 3.7 3.6    103 107   CO2 23 24 25   BUN 43* 46* 46*   CREATININE 2.1* 2.3* 2.2*   * 163* 150*   ANIONGAP 11 11 9     Recent Labs   Lab 11/17/22  0410   MG 1.7   PHOS 3.7     Recent Labs   Lab  11/15/22  1700 11/16/22  0351 11/17/22  0410   AST 23 18 13   ALT 42 34 24   ALKPHOS 52* 49* 44*   BILITOT 1.6* 2.0* 1.8*   ALBUMIN 3.5 3.4* 3.1*      Recent Labs   Lab 11/15/22  1700 11/16/22  0351 11/17/22  0410   WBC 8.83 7.83 7.57   HGB 8.1* 7.8* 7.8*   HCT 27.9* 26.5* 26.1*    203 196   GRAN 74.1*  6.6 68.5  5.4 68.0  5.2     Recent Labs   Lab 11/16/22  0022   INR 1.1     Recent Labs   Lab 11/15/22  1700 11/16/22  0351 11/17/22  0055   TROPONINI 0.769* 0.780* 0.710*     Lab Results   Component Value Date    BNP 4,104 (H) 11/15/2022     (H) 11/02/2022    BNP 2,771 (H) 10/28/2022            IMAGING:  EKGs:  Results for orders placed or performed during the hospital encounter of 11/15/22   EKG 12-lead    Collection Time: 11/16/22  9:23 PM    Narrative    Test Reason : R07.9,    Vent. Rate : 077 BPM     Atrial Rate : 077 BPM     P-R Int : 202 ms          QRS Dur : 116 ms      QT Int : 428 ms       P-R-T Axes : -17 -36 091 degrees     QTc Int : 484 ms    Normal sinus rhythm  Left axis deviation  LVH with QRS widening and repolarization abnormality ( R in aVL , [place]  product )  Abnormal ECG  When compared with ECG of 16-NOV-2022 07:58,  Premature ventricular complexes are no longer Present  Premature atrial complexes are no longer Present  Confirmed by Parviz Dewey MD ([id number]) on 11/17/2022 11:25:33 AM    Referred By: [email or web address]   SELF           Confirmed By:Parviz Dewey MD         TTE:  EF   Date Value Ref Range Status   11/30/2021 25 % Final   09/17/2021 25 % Final       Significant Imaging: I have reviewed all pertinent imaging results/findings within the past 24 hours.      INPATIENT MEDICATIONS:    Scheduled Meds:   albuterol-ipratropium  3 mL Nebulization BID    allopurinoL  100 mg Oral Daily    amiodarone  200 mg Oral Daily    apixaban  5 mg Oral BID    atorvastatin  40 mg Oral QHS    dextromethorphan-guaiFENesin  mg  1 tablet Oral BID    ferrous sulfate  1 tablet Oral Daily     fluticasone furoate-vilanteroL  1 puff Inhalation Daily    fluticasone propionate  2 spray Each Nostril Daily    furosemide (LASIX) injection  80 mg Intravenous Q12H    metoprolol succinate  100 mg Oral Daily    montelukast  10 mg Oral Daily    pantoprazole  40 mg Oral Daily    polyethylene glycol  17 g Oral Daily    sacubitriL-valsartan  1 tablet Oral BID    tiotropium bromide  2 puff Inhalation Daily     PRN Meds:ALPRAZolam, dextrose 10%, dextrose 10%, glucagon (human recombinant), glucose, glucose, insulin aspart U-100, melatonin, naloxone, nitroGLYCERIN, prochlorperazine, sodium chloride 0.9%, sodium chloride 0.9%        Assessment and Plan:     Brief HPI: 57 y.o F w/ hx of combined systolic and diastolic CHF(EF 25% 11/2021) s/p ICD, CKD stage III, morbid obesity, DM, MELISSA, HTN, pAFib on Eliquis and HLD who presented to the ED for worsening SOB. Recently admitted last month for CHF exacerbation. She reports her diuretic dose was recently cut down from Furosemide 80mg to Furosemide 40mg by Cardiology due to concerns for increased urination and worsening of kidney function. She was told to double the dose as needed, and she has done it twice without improvement in symptoms. She reports compliance with low salt diet. She reports diffuse swelling. She was previously evaluated and was not determined to be a heart transplant candidate due to CKD.     Cardiology consulted for evaluation of acute CHF exacerbation concerns with elevated troponin.    Acute on chronic combined systolic and diastolic heart failure  Patient is identified as having Combined Systolic and Diastolic heart failure that is Acute on Chronic. CHF is currently uncontrolled due to volume overload due to: Continued edema of extremities and Weight gain    - Most recent ECHO performed and demonstrates- Results for orders placed during the hospital encounter of 11/30/21    Echo  Interpretation Summary  · The left ventricle is severely enlarged  with eccentric hypertrophy and severely decreased systolic function. The estimated ejection fraction is 25%.  · There is severe left ventricular global hypokinesis.  · Mild right ventricular enlargement with moderately reduced right ventricular systolic function.  · Grade II left ventricular diastolic dysfunction.  · Biatrial enlargement.  · The estimated PA systolic pressure is 59 mmHg.  · Elevated central venous pressure (15 mmHg).  · The ascending aorta is mildly dilated.  · Small posterolateral pericardial effusion.    Recent Labs   Lab 11/15/22  1700 11/16/22  0351 11/17/22  0055   TROPONINI 0.769* 0.780* 0.710*   BNP 4,104*  --   --        Intake/Output Summary (Last 24 hours) at 11/17/2022 0913  Last data filed at 11/17/2022 0438  Gross per 24 hour   Intake --   Output 3000 ml   Net -3000 ml     Net IO Since Admission: -3,100 mL [11/17/22 0913]      Home Medications: Empagliflozin 25mg, Furosemide 40mg qd, sacubitriL-valsartan   mg per tablet BID    Recent Labs     11/16/22  0351 11/17/22  0055 11/17/22  0410   BUN 43* 46* 46*   CREATININE 2.1* 2.3* 2.2*   CO2 23 24 25       RECOMMENDATIONS  -Continue Furosemide 80mg IV BID  -Repeat TTE  -GDMT/HTN: Start Hydralazine 10mg TID, Isosorbide Dinitrate 10mg TID. Uptitrate as tolerated  -Cardiac diet with Fluid restriction at 1.5L with strict I/Os and daily weights  - Maintain on telemetry. Check Electrolytes, keep Mag >2 & K+ >4  -Preliminary Discharge med recs:   -Continue sacubitriL-valsartan   mg  BID.   -Change Jardiance from 25mg to 10mg due to renal function.  -Start Hydralazine 10mg TID, Isosorbide Dinitrate 10mg TID.  -Diuretic dosing TBD.   -Spironolactone not recommended due to low eGFR/CKD  - Heart failure/HTS clinic follow up for HF management and HTS evaluation    Paroxysmal atrial fibrillation  Normal sinus rhythm on EKG    [id number] Score: 4    RECOMMENDATIONS  -Continue amiodarone  -Continue Toprol  -Anticoagulation: On apixaban 2.5mg  BID at home(unclear why?). Does not meet criteria for dose adjustment, change to apixaban 5mg BID      Elevated troponin  Troponin remains flat at around 0.7-0.8. Likely secondary to CKD, chronic dilated cardiomyopathy, and demand ischemia secondary to HF.    Thanks for this consult. Cardiology will continue to follow.  Please contact us if any additional questions.      Cristobal Dsouza, DO  Cardiology  [facility] - [facility]

## 2022-11-17 NOTE — ASSESSMENT & PLAN NOTE
Normal sinus rhythm on EKG    [id number] Score: 4    RECOMMENDATIONS  -Continue amiodarone  -Continue Toprol  -Anticoagulation: On apixaban 2.5mg BID at home(unclear why?). Does not meet criteria for dose adjustment, change to apixaban 5mg BID

## 2022-11-17 NOTE — SUBJECTIVE & OBJECTIVE
Interval History: No acute events overnight, afebrile, hemodynamically stable. She reports some improvements in her HF exacerbation symptoms.       Review of Systems   Constitutional: Positive for malaise/fatigue. Negative for chills and fever.   HENT:  Negative for congestion and sore throat.    Eyes:  Negative for vision loss in left eye and vision loss in right eye.   Cardiovascular:  Positive for chest pain (bilateral when coughing, left sided at rest, partially improves with Nitroglycerin), dyspnea on exertion (improving), leg swelling (mild) and palpitations.   Respiratory:  Positive for cough and shortness of breath.    Musculoskeletal:  Negative for falls and muscle cramps.   Gastrointestinal:  Positive for abdominal pain and nausea. Negative for constipation, diarrhea and vomiting.   Genitourinary:  Positive for frequency (on diuresis). Negative for incomplete emptying.   Neurological:  Negative for headaches and light-headedness.   Objective:     Vital Signs (Most Recent):  Temp: 96.4 °F (35.8 °C) (11/17/22 1122)  Pulse: 72 (11/17/22 1255)  Resp: 20 (11/17/22 1122)  BP: 129/83 (11/17/22 1152)  SpO2: (!) 92 % (11/17/22 1122) Vital Signs (24h Range):  Temp:  [96.4 °F (35.8 °C)-98 °F (36.7 °C)] 96.4 °F (35.8 °C)  Pulse:  [64-89] 72  Resp:  [16-20] 20  SpO2:  [92 %-97 %] 92 %  BP: (113-146)/(64-84) 129/83     Weight: 100.4 kg (221 lb 5.5 oz)  Body mass index is 35.73 kg/m².     SpO2: (!) 92 %  O2 Device (Oxygen Therapy): nasal cannula      Intake/Output Summary (Last 24 hours) at 11/17/2022 1326  Last data filed at 11/17/2022 0438  Gross per 24 hour   Intake --   Output 3000 ml   Net -3000 ml       Lines/Drains/Airways       Peripheral Intravenous Line  Duration                  Peripheral IV - Single Lumen 11/15/22 1659 20 G Right Antecubital 1 day                    Physical Exam  Vitals and nursing note reviewed.   Constitutional:       General: She is not in acute distress.     Appearance: Normal  appearance. She is obese. She is not ill-appearing, toxic-appearing or diaphoretic.      Interventions: Nasal cannula in place.   HENT:      Head: Normocephalic and atraumatic.      Mouth/Throat:      Mouth: Mucous membranes are moist.   Eyes:      General: No scleral icterus.        Right eye: No discharge.         Left eye: No discharge.   Neck:      Vascular: JVD present.   Cardiovascular:      Rate and Rhythm: Normal rate and regular rhythm.      Heart sounds: Normal heart sounds.   Pulmonary:      Effort: Pulmonary effort is normal. No respiratory distress.      Breath sounds: Normal breath sounds.   Abdominal:      General: Abdomen is flat. Bowel sounds are normal. There is distension.      Palpations: Abdomen is soft.      Tenderness: There is no abdominal tenderness.   Musculoskeletal:         General: Swelling present. No tenderness.      Cervical back: Normal range of motion. No rigidity.      Right lower leg: Edema (mild) present.      Left lower leg: Edema (mild) present.   Skin:     General: Skin is warm and dry.   Neurological:      Mental Status: She is alert and oriented to person, place, and time. Mental status is at baseline.   Psychiatric:         Mood and Affect: Mood normal.         Behavior: Behavior normal.           LABS:  Recent Labs   Lab 11/16/22  0351 11/17/22  0055 11/17/22  0410    138 141   K 3.9 3.7 3.6    103 107   CO2 23 24 25   BUN 43* 46* 46*   CREATININE 2.1* 2.3* 2.2*   * 163* 150*   ANIONGAP 11 11 9     Recent Labs   Lab 11/17/22  0410   MG 1.7   PHOS 3.7     Recent Labs   Lab 11/15/22  1700 11/16/22  0351 11/17/22  0410   AST 23 18 13   ALT 42 34 24   ALKPHOS 52* 49* 44*   BILITOT 1.6* 2.0* 1.8*   ALBUMIN 3.5 3.4* 3.1*      Recent Labs   Lab 11/15/22  1700 11/16/22  0351 11/17/22  0410   WBC 8.83 7.83 7.57   HGB 8.1* 7.8* 7.8*   HCT 27.9* 26.5* 26.1*    203 196   GRAN 74.1*  6.6 68.5  5.4 68.0  5.2     Recent Labs   Lab 11/16/22  0022   INR 1.1      Recent Labs   Lab 11/15/22  1700 11/16/22  0351 11/17/22  0055   TROPONINI 0.769* 0.780* 0.710*     Lab Results   Component Value Date    BNP 4,104 (H) 11/15/2022     (H) 11/02/2022    BNP 2,771 (H) 10/28/2022            IMAGING:  EKGs:  Results for orders placed or performed during the hospital encounter of 11/15/22   EKG 12-lead    Collection Time: 11/16/22  9:23 PM    Narrative    Test Reason : R07.9,    Vent. Rate : 077 BPM     Atrial Rate : 077 BPM     P-R Int : 202 ms          QRS Dur : 116 ms      QT Int : 428 ms       P-R-T Axes : -17 -36 091 degrees     QTc Int : 484 ms    Normal sinus rhythm  Left axis deviation  LVH with QRS widening and repolarization abnormality ( R in aVL , [place]  product )  Abnormal ECG  When compared with ECG of 16-NOV-2022 07:58,  Premature ventricular complexes are no longer Present  Premature atrial complexes are no longer Present  Confirmed by Parviz Dewey MD ([id number]) on 11/17/2022 11:25:33 AM    Referred By: [email or web address]   SELF           Confirmed By:Parviz Dewey MD         TTE:  EF   Date Value Ref Range Status   11/30/2021 25 % Final   09/17/2021 25 % Final       Significant Imaging: I have reviewed all pertinent imaging results/findings within the past 24 hours.      INPATIENT MEDICATIONS:    Scheduled Meds:   albuterol-ipratropium  3 mL Nebulization BID    allopurinoL  100 mg Oral Daily    amiodarone  200 mg Oral Daily    apixaban  5 mg Oral BID    atorvastatin  40 mg Oral QHS    dextromethorphan-guaiFENesin  mg  1 tablet Oral BID    ferrous sulfate  1 tablet Oral Daily    fluticasone furoate-vilanteroL  1 puff Inhalation Daily    fluticasone propionate  2 spray Each Nostril Daily    furosemide (LASIX) injection  80 mg Intravenous Q12H    metoprolol succinate  100 mg Oral Daily    montelukast  10 mg Oral Daily    pantoprazole  40 mg Oral Daily    polyethylene glycol  17 g Oral Daily    sacubitriL-valsartan  1 tablet Oral BID    tiotropium bromide  2 puff  Inhalation Daily     PRN Meds:ALPRAZolam, dextrose 10%, dextrose 10%, glucagon (human recombinant), glucose, glucose, insulin aspart U-100, melatonin, naloxone, nitroGLYCERIN, prochlorperazine, sodium chloride 0.9%, sodium chloride 0.9%

## 2022-11-17 NOTE — TELEPHONE ENCOUNTER
----- Message from Lourdes Oquendo sent at 2022 11:14 AM CST -----  Contact: Joselo/[facility]  Hafsa Hawley  MRN: [id number]  : 1965  PCP: Cristobal Ann  Home Phone      [phone]  Work Phone      Not on file.  Mobile          [phone]  Home Phone      [phone]      MESSAGE:  Joselo Farris with [facility] called looking for a Hosp F/U appt for pt being discharged tomorrow from [facility] for Chronic Respiratory failure    Phone:  [phone]

## 2022-11-17 NOTE — PLAN OF CARE
APPOINTMENT:    [facility] attempted to schedle appointment for patient with [facility], but no availability listed. Staff has let a message with nurse to aid in scheduling for early availability. Expecting a call back with date/time of pending appointment.

## 2022-11-17 NOTE — ASSESSMENT & PLAN NOTE
Patient is identified as having Combined Systolic and Diastolic heart failure that is Acute on Chronic. CHF is currently uncontrolled due to volume overload due to: Continued edema of extremities and Weight gain    - Most recent ECHO performed and demonstrates- Results for orders placed during the hospital encounter of 11/30/21    Echo  Interpretation Summary  · The left ventricle is severely enlarged with eccentric hypertrophy and severely decreased systolic function. The estimated ejection fraction is 25%.  · There is severe left ventricular global hypokinesis.  · Mild right ventricular enlargement with moderately reduced right ventricular systolic function.  · Grade II left ventricular diastolic dysfunction.  · Biatrial enlargement.  · The estimated PA systolic pressure is 59 mmHg.  · Elevated central venous pressure (15 mmHg).  · The ascending aorta is mildly dilated.  · Small posterolateral pericardial effusion.    Recent Labs   Lab 11/15/22  1700 11/16/22  0351 11/17/22  0055   TROPONINI 0.769* 0.780* 0.710*   BNP 4,104*  --   --        Intake/Output Summary (Last 24 hours) at 11/17/2022 0913  Last data filed at 11/17/2022 0438  Gross per 24 hour   Intake --   Output 3000 ml   Net -3000 ml     Net IO Since Admission: -3,100 mL [11/17/22 0913]      Home Medications: Empagliflozin 25mg, Furosemide 40mg qd, sacubitriL-valsartan   mg per tablet BID    Recent Labs     11/16/22  0351 11/17/22  0055 11/17/22  0410   BUN 43* 46* 46*   CREATININE 2.1* 2.3* 2.2*   CO2 23 24 25       RECOMMENDATIONS  -Continue Furosemide 80mg IV BID  -Repeat TTE  -GDMT/HTN: Start Hydralazine 10mg TID, Isosorbide Dinitrate 10mg TID. Uptitrate as tolerated  -Cardiac diet with Fluid restriction at 1.5L with strict I/Os and daily weights  - Maintain on telemetry. Check Electrolytes, keep Mag >2 & K+ >4  -Preliminary Discharge med recs:   -Continue sacubitriL-valsartan   mg  BID.   -Change Jardiance from 25mg to 10mg due to renal  function.  -Start Hydralazine 10mg TID, Isosorbide Dinitrate 10mg TID.  -Diuretic dosing TBD.   -Spironolactone not recommended due to low eGFR/CKD  - Heart failure/HTS clinic follow up for HF management and HTS evaluation

## 2022-11-17 NOTE — TELEPHONE ENCOUNTER
I received a message that Hafsa Hawley wanted to speak with me about heart transplant evaluation.  I called her and left a message with my contact numbers.

## 2022-11-17 NOTE — ASSESSMENT & PLAN NOTE
Troponin remains flat at around 0.7-0.8. Likely secondary to CKD, chronic dilated cardiomyopathy, and demand ischemia secondary to HF.

## 2022-11-18 PROBLEM — I50.9 ACUTE ON CHRONIC CONGESTIVE HEART FAILURE: Status: ACTIVE | Noted: 2022-11-18

## 2022-11-18 PROBLEM — I50.9 HEART FAILURE: Status: ACTIVE | Noted: 2022-11-18

## 2022-11-18 LAB
ALBUMIN SERPL BCP-MCNC: 3.1 G/DL (ref 3.5–5.2)
ALP SERPL-CCNC: 43 U/L (ref 55–135)
ALT SERPL W/O P-5'-P-CCNC: 19 U/L (ref 10–44)
ANION GAP SERPL CALC-SCNC: 9 MMOL/L (ref 8–16)
AST SERPL-CCNC: 12 U/L (ref 10–40)
BASOPHILS # BLD AUTO: 0.02 K/UL (ref 0–0.2)
BASOPHILS NFR BLD: 0.3 % (ref 0–1.9)
BILIRUB SERPL-MCNC: 1.6 MG/DL (ref 0.1–1)
BUN SERPL-MCNC: 45 MG/DL (ref 6–20)
CALCIUM SERPL-MCNC: 8.9 MG/DL (ref 8.7–10.5)
CHLORIDE SERPL-SCNC: 106 MMOL/L (ref 95–110)
CO2 SERPL-SCNC: 26 MMOL/L (ref 23–29)
CREAT SERPL-MCNC: 1.9 MG/DL (ref 0.5–1.4)
DIFFERENTIAL METHOD: ABNORMAL
EOSINOPHIL # BLD AUTO: 0.2 K/UL (ref 0–0.5)
EOSINOPHIL NFR BLD: 3 % (ref 0–8)
ERYTHROCYTE [DISTWIDTH] IN BLOOD BY AUTOMATED COUNT: 28 % (ref 11.5–14.5)
EST. GFR  (NO RACE VARIABLE): 30.4 ML/MIN/1.73 M^2
GLUCOSE SERPL-MCNC: 148 MG/DL (ref 70–110)
HCT VFR BLD AUTO: 27.2 % (ref 37–48.5)
HGB BLD-MCNC: 7.9 G/DL (ref 12–16)
IMM GRANULOCYTES # BLD AUTO: 0.13 K/UL (ref 0–0.04)
IMM GRANULOCYTES NFR BLD AUTO: 1.7 % (ref 0–0.5)
LYMPHOCYTES # BLD AUTO: 1.2 K/UL (ref 1–4.8)
LYMPHOCYTES NFR BLD: 16.1 % (ref 18–48)
MAGNESIUM SERPL-MCNC: 1.7 MG/DL (ref 1.6–2.6)
MCH RBC QN AUTO: 17.7 PG (ref 27–31)
MCHC RBC AUTO-ENTMCNC: 29 G/DL (ref 32–36)
MCV RBC AUTO: 61 FL (ref 82–98)
MONOCYTES # BLD AUTO: 0.6 K/UL (ref 0.3–1)
MONOCYTES NFR BLD: 8.6 % (ref 4–15)
NEUTROPHILS # BLD AUTO: 5.2 K/UL (ref 1.8–7.7)
NEUTROPHILS NFR BLD: 70.3 % (ref 38–73)
NRBC BLD-RTO: 5 /100 WBC
PHOSPHATE SERPL-MCNC: 3.5 MG/DL (ref 2.7–4.5)
PLATELET # BLD AUTO: 178 K/UL (ref 150–450)
PMV BLD AUTO: ABNORMAL FL (ref 9.2–12.9)
POCT GLUCOSE: 148 MG/DL (ref 70–110)
POCT GLUCOSE: 162 MG/DL (ref 70–110)
POCT GLUCOSE: 170 MG/DL (ref 70–110)
POCT GLUCOSE: 190 MG/DL (ref 70–110)
POTASSIUM SERPL-SCNC: 3.6 MMOL/L (ref 3.5–5.1)
PROT SERPL-MCNC: 5.5 G/DL (ref 6–8.4)
RBC # BLD AUTO: 4.47 M/UL (ref 4–5.4)
SODIUM SERPL-SCNC: 141 MMOL/L (ref 136–145)
WBC # BLD AUTO: 7.45 K/UL (ref 3.9–12.7)

## 2022-11-18 PROCEDURE — 99233 PR SUBSEQUENT HOSPITAL CARE,LEVL III: ICD-10-PCS | Mod: ,,, | Performed by: INTERNAL MEDICINE

## 2022-11-18 PROCEDURE — 99232 SBSQ HOSP IP/OBS MODERATE 35: CPT | Mod: ,,, | Performed by: INTERNAL MEDICINE

## 2022-11-18 PROCEDURE — 99900035 HC TECH TIME PER 15 MIN (STAT)

## 2022-11-18 PROCEDURE — 99232 PR SUBSEQUENT HOSPITAL CARE,LEVL II: ICD-10-PCS | Mod: ,,, | Performed by: INTERNAL MEDICINE

## 2022-11-18 PROCEDURE — 36415 COLL VENOUS BLD VENIPUNCTURE: CPT | Performed by: STUDENT IN AN ORGANIZED HEALTH CARE EDUCATION/TRAINING PROGRAM

## 2022-11-18 PROCEDURE — 80053 COMPREHEN METABOLIC PANEL: CPT | Performed by: STUDENT IN AN ORGANIZED HEALTH CARE EDUCATION/TRAINING PROGRAM

## 2022-11-18 PROCEDURE — 25000003 PHARM REV CODE 250: Performed by: STUDENT IN AN ORGANIZED HEALTH CARE EDUCATION/TRAINING PROGRAM

## 2022-11-18 PROCEDURE — 11000001 HC ACUTE MED/SURG PRIVATE ROOM

## 2022-11-18 PROCEDURE — 63600175 PHARM REV CODE 636 W HCPCS: Performed by: HOSPITALIST

## 2022-11-18 PROCEDURE — 25000003 PHARM REV CODE 250: Performed by: INTERNAL MEDICINE

## 2022-11-18 PROCEDURE — 94640 AIRWAY INHALATION TREATMENT: CPT

## 2022-11-18 PROCEDURE — 99222 1ST HOSP IP/OBS MODERATE 55: CPT | Mod: ,,, | Performed by: HOSPITALIST

## 2022-11-18 PROCEDURE — 27000221 HC OXYGEN, UP TO 24 HOURS

## 2022-11-18 PROCEDURE — 99233 SBSQ HOSP IP/OBS HIGH 50: CPT | Mod: ,,, | Performed by: INTERNAL MEDICINE

## 2022-11-18 PROCEDURE — 25000242 PHARM REV CODE 250 ALT 637 W/ HCPCS: Performed by: STUDENT IN AN ORGANIZED HEALTH CARE EDUCATION/TRAINING PROGRAM

## 2022-11-18 PROCEDURE — 94761 N-INVAS EAR/PLS OXIMETRY MLT: CPT

## 2022-11-18 PROCEDURE — 85025 COMPLETE CBC W/AUTO DIFF WBC: CPT | Performed by: STUDENT IN AN ORGANIZED HEALTH CARE EDUCATION/TRAINING PROGRAM

## 2022-11-18 PROCEDURE — 99222 PR INITIAL HOSPITAL CARE,LEVL II: ICD-10-PCS | Mod: ,,, | Performed by: HOSPITALIST

## 2022-11-18 PROCEDURE — 84100 ASSAY OF PHOSPHORUS: CPT | Performed by: STUDENT IN AN ORGANIZED HEALTH CARE EDUCATION/TRAINING PROGRAM

## 2022-11-18 PROCEDURE — 83735 ASSAY OF MAGNESIUM: CPT | Performed by: STUDENT IN AN ORGANIZED HEALTH CARE EDUCATION/TRAINING PROGRAM

## 2022-11-18 RX ORDER — HYDRALAZINE HYDROCHLORIDE 25 MG/1
25 TABLET, FILM COATED ORAL 3 TIMES DAILY
Status: DISCONTINUED | OUTPATIENT
Start: 2022-11-18 | End: 2022-11-19

## 2022-11-18 RX ORDER — HYDROCODONE BITARTRATE AND ACETAMINOPHEN 7.5; 325 MG/15ML; MG/15ML
10 SOLUTION ORAL EVERY 8 HOURS PRN
Status: DISCONTINUED | OUTPATIENT
Start: 2022-11-18 | End: 2022-11-22 | Stop reason: HOSPADM

## 2022-11-18 RX ORDER — BENZONATATE 100 MG/1
100 CAPSULE ORAL 3 TIMES DAILY PRN
Status: DISCONTINUED | OUTPATIENT
Start: 2022-11-18 | End: 2022-11-18

## 2022-11-18 RX ORDER — ISOSORBIDE DINITRATE 20 MG/1
20 TABLET ORAL 3 TIMES DAILY
Status: DISCONTINUED | OUTPATIENT
Start: 2022-11-18 | End: 2022-11-20

## 2022-11-18 RX ORDER — FUROSEMIDE 10 MG/ML
80 INJECTION INTRAMUSCULAR; INTRAVENOUS 3 TIMES DAILY
Status: DISCONTINUED | OUTPATIENT
Start: 2022-11-19 | End: 2022-11-21

## 2022-11-18 RX ADMIN — HYDRALAZINE HYDROCHLORIDE 25 MG: 25 TABLET, FILM COATED ORAL at 05:11

## 2022-11-18 RX ADMIN — FLUTICASONE PROPIONATE 100 MCG: 50 SPRAY, METERED NASAL at 08:11

## 2022-11-18 RX ADMIN — ATORVASTATIN CALCIUM 40 MG: 40 TABLET, FILM COATED ORAL at 08:11

## 2022-11-18 RX ADMIN — SACUBITRIL AND VALSARTAN 1 TABLET: 97; 103 TABLET, FILM COATED ORAL at 08:11

## 2022-11-18 RX ADMIN — GUAIFENESIN AND DEXTROMETHORPHAN HYDROBROMIDE 2 TABLET: 600; 30 TABLET, EXTENDED RELEASE ORAL at 08:11

## 2022-11-18 RX ADMIN — FERROUS SULFATE TAB 325 MG (65 MG ELEMENTAL FE) 1 EACH: 325 (65 FE) TAB at 08:11

## 2022-11-18 RX ADMIN — TIOTROPIUM BROMIDE INHALATION SPRAY 2 PUFF: 3.12 SPRAY, METERED RESPIRATORY (INHALATION) at 08:11

## 2022-11-18 RX ADMIN — HYDROCODONE BITARTRATE AND ACETAMINOPHEN 10 ML: 7.5; 325 SOLUTION ORAL at 01:11

## 2022-11-18 RX ADMIN — MONTELUKAST 10 MG: 10 TABLET, FILM COATED ORAL at 08:11

## 2022-11-18 RX ADMIN — APIXABAN 5 MG: 5 TABLET, FILM COATED ORAL at 08:11

## 2022-11-18 RX ADMIN — HYDRALAZINE HYDROCHLORIDE 25 MG: 25 TABLET, FILM COATED ORAL at 08:11

## 2022-11-18 RX ADMIN — IPRATROPIUM BROMIDE AND ALBUTEROL SULFATE 3 ML: 2.5; .5 SOLUTION RESPIRATORY (INHALATION) at 08:11

## 2022-11-18 RX ADMIN — ISOSORBIDE DINITRATE 20 MG: 20 TABLET ORAL at 05:11

## 2022-11-18 RX ADMIN — FUROSEMIDE 80 MG: 10 INJECTION, SOLUTION INTRAMUSCULAR; INTRAVENOUS at 08:11

## 2022-11-18 RX ADMIN — AMIODARONE HYDROCHLORIDE 200 MG: 200 TABLET ORAL at 08:11

## 2022-11-18 RX ADMIN — METOPROLOL SUCCINATE 100 MG: 100 TABLET, EXTENDED RELEASE ORAL at 08:11

## 2022-11-18 RX ADMIN — ALLOPURINOL 100 MG: 100 TABLET ORAL at 08:11

## 2022-11-18 RX ADMIN — FLUTICASONE FUROATE AND VILANTEROL TRIFENATATE 1 PUFF: 200; 25 POWDER RESPIRATORY (INHALATION) at 08:11

## 2022-11-18 RX ADMIN — ISOSORBIDE DINITRATE 20 MG: 20 TABLET ORAL at 08:11

## 2022-11-18 RX ADMIN — PANTOPRAZOLE SODIUM 40 MG: 40 TABLET, DELAYED RELEASE ORAL at 08:11

## 2022-11-18 NOTE — ASSESSMENT & PLAN NOTE
Last EF 25%. Grade II DD  Clinically moderately volume overloaded   She is on her home O2, suspect quick turnaround, BNP likely elevated in setting of home Entresto      - Diuresis with Lasix 80 mg IV QD--> 80 mg BID --> 80 mg TID - oral 80 mg TID--> BID  - Continue GDMT   - Not a great candidate for aldactone given Cr near 2.5   11/16 - trend troponin 0.7 x two. will consult cardiology.  Appreciate recs. F/u Dr. Javier- has appoint ment next week. heart/ kidney transplant eval would be done as outpatient

## 2022-11-18 NOTE — CONSULTS
"  [facility] - [facility]  Adult Nutrition  Consult Note    SUMMARY     Recommendations    1. Continue current diabetic diet- add low sodium diet restrictions- encourage adequate PO intake.     2. If PO intake <50%, add Boost Glucose Control BID.     3. RD following.    Goals: Will meet % EEN/EPN by next RD f/u.  Nutrition Goal Status: new  Communication of RD Recs:  (POC)    Assessment and Plan    No nutrition dxn at this time.    Reason for Assessment    Reason For Assessment: consult  Diagnosis:  (Acute on chronic respiratory failure with hypoxia)  Relevant Medical History: HTN, PAF, T2DM, [place], CHF, CKD 3, HLD, Afib  Interdisciplinary Rounds: did not attend  General Information Comments: RD consulted for salt and fluid education. Unable to speak with pt 2/2 to not being in room during RD visit attempt. Noted pt was last month admit for CHF exacerbation. Noted pt with abdominal pain, n/v, and diarrhea with mixed periods of constipation PTA- unsure if still current issues at this time. Noted pt on DM/cardiac diet with 1500 mL fluids. Noted LE swelling. Noted pt reports dry wt is 207# and endorses wt gain - #. Unable to complete NFPE at this time. "Heart Failure Nutrition Therapy" handout left at bedside- please message RD if any question arise. LBM 11/15.  Nutrition Discharge Planning: Low sodium/diabetic diet with fluid per MD    Nutrition Risk Screen    Nutrition Risk Screen: no indicators present    Nutrition/Diet History    Food Allergies: NKFA  Factors Affecting Nutritional Intake: None identified at this time    Anthropometrics    Temp: 96.7 °F (35.9 °C)  Height Method: Stated  Height: 5' 6" (167.6 cm)  Height (inches): 66 in  Weight Method: Bed Scale  Weight: 100.2 kg (220 lb 14.4 oz)  Weight (lb): 220.9 lb  Ideal Body Weight (IBW), Female: 130 lb  % Ideal Body Weight, Female (lb): 169.92 %  BMI (Calculated): 35.7  BMI Grade: 35 - 39.9 - obesity - grade II     Lab/Procedures/Meds    Pertinent Labs " Reviewed: reviewed  Pertinent Labs Comments: Glucose 148, A1C 8.2, Albumin 3.1, Al Phos 43, GFR 29.7, T Bili 1.6, BNP 4104, Cholesterol 110, BUN 45  Pertinent Medications Reviewed: reviewed  Pertinent Medications Comments: atorvastatin, furosemide, metoprolol succinate, pantoprazole    Estimated/Assessed Needs    Weight Used For Calorie Calculations: 59 kg (130 lb)  Energy Calorie Requirements (kcal): 1474 kcals  Energy Need Method: Kcal/kg (25 kcal/kg IBW)  Protein Requirements: 59 g (1.0 g/kg IBW)  Weight Used For Protein Calculations: 59 kg (130 lb)  Fluid Requirements (mL): 1 ml or fluid per MD  Estimated Fluid Requirement Method: RDA Method  RDA Method (mL): 1474  CHO Requirement: 184 g    Nutrition Prescription Ordered    Current Diet Order: Diabetic, Cardiac, fluid 1500 mL    Evaluation of Received Nutrient/Fluid Intake    I/O: -    Nutrition Risk    Level of Risk/Frequency of Follow-up:  (1 time/week)     Monitor and Evaluation    Food and Nutrient Intake: energy intake, food and beverage intake  Food and Nutrient Adminstration: diet order  Knowledge/Beliefs/Attitudes: food and nutrition knowledge/skill, beliefs and attitudes  Physical Activity and Function: nutrition-related ADLs and IADLs  Anthropometric Measurements: weight, height/length, weight change, body mass index  Biochemical Data, Medical Tests and Procedures: electrolyte and renal panel, glucose/endocrine profile, inflammatory profile, lipid profile, gastrointestinal profile  Nutrition-Focused Physical Findings: overall appearance     Nutrition Follow-Up    RD Follow-up?: Yes    Kaila Dawson, Registration Eligible, Provisional LDN

## 2022-11-18 NOTE — PROGRESS NOTES
[facility]  Cardiology  Progress Note    Patient Name: Hafsa Hawley  MRN: [id number]  Admission Date: 11/15/2022  Hospital Length of Stay: 3 days  Code Status: Full Code   Attending Physician: Karyn Parrish MD   Primary Care Physician: Cristobal Ann MD  Expected Discharge Date: 11/18/2022  Principal Problem:Acute on chronic respiratory failure with hypoxia    Subjective:   Interval History: No acute events overnight, afebrile, hemodynamically stable. MAPs remain greater than 65 after Hydralazine-Isosorbide initiation. Improving symptoms, but reports increased cough overnight that did not improve much with benzonatate.       Review of Systems   Constitutional: Positive for malaise/fatigue. Negative for chills and fever.   HENT:  Negative for congestion and sore throat.    Eyes:  Negative for vision loss in left eye and vision loss in right eye.   Cardiovascular:  Positive for chest pain (periodic, bilateral when coughing but can be left sided at rest. Improving), dyspnea on exertion (improving), leg swelling (mild) and palpitations.   Respiratory:  Positive for cough and shortness of breath.    Skin:  Negative for itching and rash.   Musculoskeletal:  Negative for falls and muscle cramps.   Gastrointestinal:  Positive for nausea. Negative for constipation, diarrhea and vomiting.   Genitourinary:  Positive for frequency (on diuresis). Negative for incomplete emptying.   Neurological:  Negative for headaches and light-headedness.   Objective:     Vital Signs (Most Recent):  Temp: 96.8 °F (36 °C) (11/18/22 0755)  Pulse: 65 (11/18/22 0849)  Resp: 18 (11/18/22 0802)  BP: 138/84 (11/18/22 0849)  SpO2: 95 % (11/18/22 0802)   Vital Signs (24h Range):  Temp:  [96.4 °F (35.8 °C)-98 °F (36.7 °C)] 96.8 °F (36 °C)  Pulse:  [61-86] 65  Resp:  [16-20] 18  SpO2:  [92 %-97 %] 95 %  BP: (109-138)/(51-84) 138/84     Weight: 100.2 kg (221 lb)  Body mass index is 35.67 kg/m².     SpO2: 95 %  O2 Device (Oxygen Therapy): nasal  cannula    No intake or output data in the 24 hours ending 11/18/22 0930    Lines/Drains/Airways       Peripheral Intravenous Line  Duration                  Peripheral IV - Single Lumen 11/15/22 1659 20 G Right Antecubital 2 days                    Physical Exam  Vitals and nursing note reviewed.   Constitutional:       General: She is not in acute distress.     Appearance: Normal appearance. She is obese. She is not ill-appearing, toxic-appearing or diaphoretic.      Interventions: Nasal cannula in place.   HENT:      Head: Normocephalic and atraumatic.      Mouth/Throat:      Mouth: Mucous membranes are moist.   Eyes:      General: No scleral icterus.        Right eye: No discharge.         Left eye: No discharge.   Neck:      Vascular: JVD present.   Cardiovascular:      Rate and Rhythm: Normal rate and regular rhythm.      Heart sounds: Normal heart sounds.   Pulmonary:      Effort: Pulmonary effort is normal. No respiratory distress.      Breath sounds: Normal breath sounds.   Abdominal:      General: Abdomen is flat. Bowel sounds are normal. There is distension.      Palpations: Abdomen is soft.      Tenderness: There is no abdominal tenderness.   Musculoskeletal:         General: Swelling present. No tenderness.      Cervical back: Normal range of motion. No rigidity.      Right lower leg: Edema (trace) present.      Left lower leg: Edema (trace) present.   Skin:     General: Skin is warm and dry.   Neurological:      Mental Status: She is alert and oriented to person, place, and time. Mental status is at baseline.   Psychiatric:         Mood and Affect: Mood normal.         Behavior: Behavior normal.           LABS:  Recent Labs   Lab 11/17/22  0055 11/17/22  0410 11/18/22  0528    141 141   K 3.7 3.6 3.6    107 106   CO2 24 25 26   BUN 46* 46* 45*   CREATININE 2.3* 2.2* 1.9*   * 150* 148*   ANIONGAP 11 9 9     Recent Labs   Lab 11/18/22  0528   MG 1.7   PHOS 3.5     Recent Labs   Lab  11/16/22  0351 11/17/22  0410 11/18/22  0528   AST 18 13 12   ALT 34 24 19   ALKPHOS 49* 44* 43*   BILITOT 2.0* 1.8* 1.6*   ALBUMIN 3.4* 3.1* 3.1*      Recent Labs   Lab 11/16/22  0351 11/17/22  0410 11/18/22  0528   WBC 7.83 7.57 7.45   HGB 7.8* 7.8* 7.9*   HCT 26.5* 26.1* 27.2*    196 178   GRAN 68.5  5.4 68.0  5.2 70.3  5.2     Recent Labs   Lab 11/16/22  0022   INR 1.1     Recent Labs   Lab 11/15/22  1700 11/16/22  0351 11/17/22  0055   TROPONINI 0.769* 0.780* 0.710*     Lab Results   Component Value Date    BNP 4,104 (H) 11/15/2022     (H) 11/02/2022    BNP 2,771 (H) 10/28/2022            IMAGING:  EKGs:  Results for orders placed or performed during the hospital encounter of 11/15/22   EKG 12-lead    Collection Time: 11/16/22  9:23 PM    Narrative    Test Reason : R07.9,    Vent. Rate : 077 BPM     Atrial Rate : 077 BPM     P-R Int : 202 ms          QRS Dur : 116 ms      QT Int : 428 ms       P-R-T Axes : -17 -36 091 degrees     QTc Int : 484 ms    Normal sinus rhythm  Left axis deviation  LVH with QRS widening and repolarization abnormality ( R in aVL , [place]  product )  Abnormal ECG  When compared with ECG of 16-NOV-2022 07:58,  Premature ventricular complexes are no longer Present  Premature atrial complexes are no longer Present  Confirmed by Parviz Dewey MD ([id number]) on 11/17/2022 11:25:33 AM    Referred By: [email or web address]   SELF           Confirmed By:Parviz Dewey MD       TTE:  11/17/22  Summary     The left ventricle is severely enlarged with eccentric hypertrophy and severely decreased systolic function. The estimated ejection fraction is 10%.   The right ventricle is not well visualized but appears normal in size with moderately reduced systolic function.   Grade II left ventricular diastolic dysfunction.   Biatrial enlargement.   The estimated PA systolic pressure is 65 mmHg.   Elevated central venous pressure (15 mmHg).   Small posterolateral pericardial effusion.  EF   Date  Value Ref Range Status   11/17/2022 10 % Final   11/30/2021 25 % Final   09/17/2021 25 % Final       Significant Imaging: I have reviewed all pertinent imaging results/findings within the past 24 hours.      INPATIENT MEDICATIONS:    Scheduled Meds:   albuterol-ipratropium  3 mL Nebulization BID    allopurinoL  100 mg Oral Daily    amiodarone  200 mg Oral Daily    apixaban  5 mg Oral BID    atorvastatin  40 mg Oral QHS    dextromethorphan-guaiFENesin  mg  2 tablet Oral BID    ferrous sulfate  1 tablet Oral Daily    fluticasone furoate-vilanteroL  1 puff Inhalation Daily    fluticasone propionate  2 spray Each Nostril Daily    furosemide (LASIX) injection  80 mg Intravenous Q12H    hydrALAZINE  25 mg Oral TID    isosorbide dinitrate  20 mg Oral TID    metoprolol succinate  100 mg Oral Daily    montelukast  10 mg Oral Daily    pantoprazole  40 mg Oral Daily    polyethylene glycol  17 g Oral Daily    sacubitriL-valsartan  1 tablet Oral BID    tiotropium bromide  2 puff Inhalation Daily     PRN Meds:ALPRAZolam, dextrose 10%, dextrose 10%, glucagon (human recombinant), glucose, glucose, hydrocodone-apap 7.5-325 MG/15 ML, insulin aspart U-100, melatonin, naloxone, nitroGLYCERIN, prochlorperazine, sodium chloride 0.9%, sodium chloride 0.9%        Assessment and Plan:     Acute on chronic combined systolic and diastolic heart failure  Patient is identified as having Combined Systolic and Diastolic heart failure that is Acute on Chronic. CHF is currently uncontrolled due to volume overload due to: Continued edema of extremities and Weight gain    TTE[11/17/22]  Summary   The left ventricle is severely enlarged with eccentric hypertrophy and severely decreased systolic function. The estimated ejection fraction is 10%.   The right ventricle is not well visualized but appears normal in size with moderately reduced systolic function.   Grade II left ventricular diastolic dysfunction.   Biatrial  enlargement.   The estimated PA systolic pressure is 65 mmHg.   Elevated central venous pressure (15 mmHg).   Small posterolateral pericardial effusion.    Recent Labs   Lab 11/15/22  1700 11/16/22  0351 11/17/22  0055   TROPONINI 0.769* 0.780* 0.710*   BNP 4,104*  --   --      Home Medications: empagliflozin 25mg, furosemide 40mg qd, sacubitriL-valsartan   mg per tablet BID, metoprolol XL 100mg    Recent Labs     11/17/22  0055 11/17/22  0410 11/18/22  0528   BUN 46* 46* 45*   CREATININE 2.3* 2.2* 1.9*   CO2 24 25 26       RECOMMENDATIONS  -Given elevated CVP on recent TTE and improvement in labs on diuresis, recommend increasing to Furosemide 80mg IV TID  -GDMT/HTN: Continue Hydralazine-Isosorbide Dinitrate TID. Uptitrate as tolerated  -Cardiac diet with Fluid restriction at 1.5L with strict I/Os and daily weights  - Maintain on telemetry. Check Electrolytes, keep Mag >2 & K+ >4  -Preliminary Discharge med recs:   -Continue sacubitriL-valsartan   mg  BID.   -Continue metoprolol XL 100mg QD  -Change Jardiance from 25mg to 10mg qd due to renal function.  -Start Hydralazine-Isosorbide TID  -Diuretic dosing TBD.   -Spironolactone not recommended due to low eGFR/CKD  - Heart failure/HTS clinic follow up for HF management and HTS evaluation. Recommend inpatient Nephrology consult to initiate reevaluation for advanced therapies/heart transplant    Paroxysmal atrial fibrillation  Normal sinus rhythm on EKG    [id number] Score: 4    RECOMMENDATIONS  -Continue amiodarone  -Continue Toprol  -Anticoagulation: On apixaban 2.5mg BID at home(unclear why?). Does not meet criteria for dose adjustment, change to apixaban 5mg BID      Elevated troponin  Troponin remains flat at around 0.7-0.8. Likely secondary to CKD, chronic dilated cardiomyopathy, and demand ischemia secondary to HF.    Thanks for this consult. Cardiology will continue to follow.  Please contact us if any additional questions.      Cristobal Lay  DO Meet  Cardiology  [facility] Surg

## 2022-11-18 NOTE — ASSESSMENT & PLAN NOTE
Serum creatinine stable and improving  CKD stage 4 - HFrEF (25%), obesity, HTN, DM    Management of CKD   -aggressive risk factor modificaiton  -can consult kidney transplant

## 2022-11-18 NOTE — SUBJECTIVE & OBJECTIVE
Interval History: No acute events overnight, afebrile, hemodynamically stable. MAPs remain greater than 65 after Hydralazine-Isosorbide initiation. Improving symptoms, but reports increased cough overnight that did not improve much with benzonatate.       Review of Systems   Constitutional: Positive for malaise/fatigue. Negative for chills and fever.   HENT:  Negative for congestion and sore throat.    Eyes:  Negative for vision loss in left eye and vision loss in right eye.   Cardiovascular:  Positive for chest pain (periodic, bilateral when coughing but can be left sided at rest. Improving), dyspnea on exertion (improving), leg swelling (mild) and palpitations.   Respiratory:  Positive for cough and shortness of breath.    Skin:  Negative for itching and rash.   Musculoskeletal:  Negative for falls and muscle cramps.   Gastrointestinal:  Positive for nausea. Negative for constipation, diarrhea and vomiting.   Genitourinary:  Positive for frequency (on diuresis). Negative for incomplete emptying.   Neurological:  Negative for headaches and light-headedness.   Objective:     Vital Signs (Most Recent):  Temp: 96.8 °F (36 °C) (11/18/22 0755)  Pulse: 65 (11/18/22 0849)  Resp: 18 (11/18/22 0802)  BP: 138/84 (11/18/22 0849)  SpO2: 95 % (11/18/22 0802)   Vital Signs (24h Range):  Temp:  [96.4 °F (35.8 °C)-98 °F (36.7 °C)] 96.8 °F (36 °C)  Pulse:  [61-86] 65  Resp:  [16-20] 18  SpO2:  [92 %-97 %] 95 %  BP: (109-138)/(51-84) 138/84     Weight: 100.2 kg (221 lb)  Body mass index is 35.67 kg/m².     SpO2: 95 %  O2 Device (Oxygen Therapy): nasal cannula    No intake or output data in the 24 hours ending 11/18/22 0930    Lines/Drains/Airways       Peripheral Intravenous Line  Duration                  Peripheral IV - Single Lumen 11/15/22 1659 20 G Right Antecubital 2 days                    Physical Exam  Vitals and nursing note reviewed.   Constitutional:       General: She is not in acute distress.     Appearance: Normal  appearance. She is obese. She is not ill-appearing, toxic-appearing or diaphoretic.      Interventions: Nasal cannula in place.   HENT:      Head: Normocephalic and atraumatic.      Mouth/Throat:      Mouth: Mucous membranes are moist.   Eyes:      General: No scleral icterus.        Right eye: No discharge.         Left eye: No discharge.   Neck:      Vascular: JVD present.   Cardiovascular:      Rate and Rhythm: Normal rate and regular rhythm.      Heart sounds: Normal heart sounds.   Pulmonary:      Effort: Pulmonary effort is normal. No respiratory distress.      Breath sounds: Normal breath sounds.   Abdominal:      General: Abdomen is flat. Bowel sounds are normal. There is distension.      Palpations: Abdomen is soft.      Tenderness: There is no abdominal tenderness.   Musculoskeletal:         General: Swelling present. No tenderness.      Cervical back: Normal range of motion. No rigidity.      Right lower leg: Edema (trace) present.      Left lower leg: Edema (trace) present.   Skin:     General: Skin is warm and dry.   Neurological:      Mental Status: She is alert and oriented to person, place, and time. Mental status is at baseline.   Psychiatric:         Mood and Affect: Mood normal.         Behavior: Behavior normal.           LABS:  Recent Labs   Lab 11/17/22  0055 11/17/22  0410 11/18/22  0528    141 141   K 3.7 3.6 3.6    107 106   CO2 24 25 26   BUN 46* 46* 45*   CREATININE 2.3* 2.2* 1.9*   * 150* 148*   ANIONGAP 11 9 9     Recent Labs   Lab 11/18/22  0528   MG 1.7   PHOS 3.5     Recent Labs   Lab 11/16/22  0351 11/17/22  0410 11/18/22  0528   AST 18 13 12   ALT 34 24 19   ALKPHOS 49* 44* 43*   BILITOT 2.0* 1.8* 1.6*   ALBUMIN 3.4* 3.1* 3.1*      Recent Labs   Lab 11/16/22  0351 11/17/22  0410 11/18/22  0528   WBC 7.83 7.57 7.45   HGB 7.8* 7.8* 7.9*   HCT 26.5* 26.1* 27.2*    196 178   GRAN 68.5  5.4 68.0  5.2 70.3  5.2     Recent Labs   Lab 11/16/22  0022   INR 1.1      Recent Labs   Lab 11/15/22  1700 11/16/22  0351 11/17/22  0055   TROPONINI 0.769* 0.780* 0.710*     Lab Results   Component Value Date    BNP 4,104 (H) 11/15/2022     (H) 11/02/2022    BNP 2,771 (H) 10/28/2022            IMAGING:  EKGs:  Results for orders placed or performed during the hospital encounter of 11/15/22   EKG 12-lead    Collection Time: 11/16/22  9:23 PM    Narrative    Test Reason : R07.9,    Vent. Rate : 077 BPM     Atrial Rate : 077 BPM     P-R Int : 202 ms          QRS Dur : 116 ms      QT Int : 428 ms       P-R-T Axes : -17 -36 091 degrees     QTc Int : 484 ms    Normal sinus rhythm  Left axis deviation  LVH with QRS widening and repolarization abnormality ( R in aVL , [place]  product )  Abnormal ECG  When compared with ECG of 16-NOV-2022 07:58,  Premature ventricular complexes are no longer Present  Premature atrial complexes are no longer Present  Confirmed by Parviz Dewey MD ([id number]) on 11/17/2022 11:25:33 AM    Referred By: [email or web address]   SELF           Confirmed By:Parviz Dewey MD       TTE:  11/17/22  Summary    The left ventricle is severely enlarged with eccentric hypertrophy and severely decreased systolic function. The estimated ejection fraction is 10%.  The right ventricle is not well visualized but appears normal in size with moderately reduced systolic function.  Grade II left ventricular diastolic dysfunction.  Biatrial enlargement.  The estimated PA systolic pressure is 65 mmHg.  Elevated central venous pressure (15 mmHg).  Small posterolateral pericardial effusion.  EF   Date Value Ref Range Status   11/17/2022 10 % Final   11/30/2021 25 % Final   09/17/2021 25 % Final       Significant Imaging: I have reviewed all pertinent imaging results/findings within the past 24 hours.      INPATIENT MEDICATIONS:    Scheduled Meds:   albuterol-ipratropium  3 mL Nebulization BID    allopurinoL  100 mg Oral Daily    amiodarone  200 mg Oral Daily    apixaban  5 mg Oral BID     atorvastatin  40 mg Oral QHS    dextromethorphan-guaiFENesin  mg  2 tablet Oral BID    ferrous sulfate  1 tablet Oral Daily    fluticasone furoate-vilanteroL  1 puff Inhalation Daily    fluticasone propionate  2 spray Each Nostril Daily    furosemide (LASIX) injection  80 mg Intravenous Q12H    hydrALAZINE  25 mg Oral TID    isosorbide dinitrate  20 mg Oral TID    metoprolol succinate  100 mg Oral Daily    montelukast  10 mg Oral Daily    pantoprazole  40 mg Oral Daily    polyethylene glycol  17 g Oral Daily    sacubitriL-valsartan  1 tablet Oral BID    tiotropium bromide  2 puff Inhalation Daily     PRN Meds:ALPRAZolam, dextrose 10%, dextrose 10%, glucagon (human recombinant), glucose, glucose, hydrocodone-apap 7.5-325 MG/15 ML, insulin aspart U-100, melatonin, naloxone, nitroGLYCERIN, prochlorperazine, sodium chloride 0.9%, sodium chloride 0.9%

## 2022-11-18 NOTE — PROGRESS NOTES
Hospital Medicine  Progress note    [place] [facility] Karyn Parrish MD  Admit Date: 11/15/2022    Principal Problem:  Acute on chronic respiratory failure with hypoxia    Interval hx:  Still dyspneic. She still feels some swelling in abdomen    ROS   Respiratory: neg for cough, no wheezing  cardiovascular: neg for chest pain neg for palpitations  Gastrointestinal: neg for nausea neg for vomiting, neg for abdominal pain neg for diarrhea neg for constipation   Behavioral/Psych: neg for depression neg for anxiety    PEx  Temp:  [96.4 °F (35.8 °C)-98 °F (36.7 °C)]   Pulse:  [64-86]   Resp:  [16-20]   BP: (113-129)/(58-83)   SpO2:  [92 %-96 %]     Intake/Output Summary (Last 24 hours) at 11/17/2022 2328  Last data filed at 11/17/2022 0438  Gross per 24 hour   Intake --   Output 1000 ml   Net -1000 ml       General Appearance: no acute distress, WD, obese  Heart: regular rate and rhythm, no heave, no BLE edema,  Respiratory: Normal respiratory effort, symmetric excursion, bibasilar crackles  Abdomen: Soft, non-tender; bowel sounds active  Skin: intact, no rash, no ulcers  Neurologic:  No focal numbness or weakness  Mental status: Alert, oriented x 4, affect appropriate     Recent Labs   Lab 11/15/22  1700 11/16/22  0351 11/17/22  0410   WBC 8.83 7.83 7.57   HGB 8.1* 7.8* 7.8*   HCT 27.9* 26.5* 26.1*    203 196     Recent Labs   Lab 11/16/22  0351 11/17/22  0055 11/17/22  0410    138 141   K 3.9 3.7 3.6    103 107   CO2 23 24 25   BUN 43* 46* 46*   CREATININE 2.1* 2.3* 2.2*   * 163* 150*   CALCIUM 9.2 8.9 8.9   MG 1.8 1.8 1.7   PHOS 3.5  --  3.7     Recent Labs   Lab 11/15/22  1700 11/16/22  0022 11/16/22  0351 11/17/22  0410   ALKPHOS 52*  --  49* 44*   ALT 42  --  34 24   AST 23  --  18 13   ALBUMIN 3.5  --  3.4* 3.1*   PROT 6.2  --  6.0 5.6*   BILITOT 1.6*  --  2.0* 1.8*   INR  --  1.1  --   --         Recent Labs   Lab 11/16/22  1621 11/16/22  1950 11/17/22  0805 11/17/22  1116  11/17/22  1610 11/17/22  1932   POCTGLUCOSE 204* 181* 145* 164* 161* 177*       Scheduled Meds:   albuterol-ipratropium  3 mL Nebulization BID    allopurinoL  100 mg Oral Daily    amiodarone  200 mg Oral Daily    apixaban  5 mg Oral BID    atorvastatin  40 mg Oral QHS    dextromethorphan-guaiFENesin  mg  1 tablet Oral BID    ferrous sulfate  1 tablet Oral Daily    fluticasone furoate-vilanteroL  1 puff Inhalation Daily    fluticasone propionate  2 spray Each Nostril Daily    furosemide (LASIX) injection  80 mg Intravenous Q12H    hydrALAZINE-hydrALAZINE-isorsorbide dinitrate (BIDIL 20/37.5) combination 1 tablet   Oral TID    metoprolol succinate  100 mg Oral Daily    montelukast  10 mg Oral Daily    pantoprazole  40 mg Oral Daily    polyethylene glycol  17 g Oral Daily    sacubitriL-valsartan  1 tablet Oral BID    tiotropium bromide  2 puff Inhalation Daily     Continuous Infusions:  As Needed:  ALPRAZolam, dextrose 10%, dextrose 10%, glucagon (human recombinant), glucose, glucose, insulin aspart U-100, melatonin, naloxone, nitroGLYCERIN, prochlorperazine, sodium chloride 0.9%, sodium chloride 0.9%    Assessment and Plan  / Problems managed today    * Acute on chronic respiratory failure with hypoxia  Home O2- 2 L's, currently at baseline, however moderately volume overloaded with WASHINGTON likely 2/2 cardiorenal   - Diurese and trend kidney function   - presented w increased SB and work of breathing, volume overload, WASHINGTON and NSTEMI    Hypertensive cardiovascular-renal disease, stage 1-4 or unspecified chronic kidney disease, with heart failure  See above      Acute on chronic combined systolic and diastolic heart failure  Last EF 25%. Grade II DD  Clinically moderately volume overloaded   She is on her home O2, suspect quick turnaround, BNP likely elevated in setting of home Entresto      - Diuresis with Lasix 80 mg IV QD, transition to po as able    - Continue GDMT   - Not a great candidate for aldactone given Cr  near 2.5   11/16 - trend troponin 0.7 x two. will consult cardiology.  Appreciate recs. F/u Dr. Javier- has appoint ment next week. Wants heart/ kidney transplant eval    WASHINGTON (acute kidney injury)  Baseline Creatinine: 1.8-2.1  - Creatinine on admission: 2.4  - Urine lytes (Urine Na, Urine Cr, Urine Urea if on diuretic) and UA ordered   - Consider US of the retroperitoneum  - PRN Bladder Scans ordered    11/16- cr 2.4-> 2.1 after IV laasix    Type 2 diabetes mellitus without complication, without long-term current use of insulin  - Home- Jardiance and Glimepiride  - LDSSI  - POCT checks ordered   No results for input(s): POCTGLUCOSE in the last 72 hours.      elevated bilirubin d/t Gilbert's syndrome  - Monitor CMP       ICD (implantable cardioverter-defibrillator) in place  - Tele       Depression due to physical illness  Home Med- Cymbalta for chronic medical illnesses, not compliant at present       Paroxysmal atrial fibrillation  - Continue amio  - Continue Toprol   - Increase Apixaban to correct dose (was on 2.5 mg BID, unclear why as she only meets 1/3 criteria for dose reduction)- may be worth investigating further, denies any GI bleeding but rather occasional bruising   11/16- HR stable      MELISSA (obstructive sleep apnea)  - CPAP ordered     Essential hypertension  Continue home meds     Elevated troponin  - Likely elevated in setting of CKD- repeat and trend to peak   - 0.7 x 2, consult Cardiology        Discharge Planning   CHIQUI: 11/18/2022     Code Status: Full Code   Is the patient medically ready for discharge?: No    Reason for patient still in hospital (select all that apply): Patient trending condition and Treatment           Diet:  low salt  GI PPx: protonix  DVT PPx:  apixaban  Airways: room air  Wounds:none    Goals of Care:  Return to prior functional status       Time (minutes) spent in care of the patient (Greater than 1/2 spent in direct face-to-face contact and care coordination on unit) 35  lupe Parrish MD

## 2022-11-18 NOTE — PROGRESS NOTES
[facility] (H[facility]) Team notified of pt referral via [facility] (automated inbasket notification) .    Patient screened today by provider and RN for enrollment to program.      Pt was deemed not a candidate for enrollment at this time related to patient is followed by [facility] Failure [facility].Pt followed by hospitals-CHF.      Pt will require additional follow up planning per primary team.     If pt status, diagnosis, or treatment plan changes , please place AMB referral to [facility] ([id number]) for HFTCC enrollment re-evalution.

## 2022-11-18 NOTE — HPI
57 year old female with a history of HFrEF (25%) with ICD, CKD 3, morbid obesity, DM, MELISSA, HTN, pAfib on eliquis who presents with heart failure exacerbation. Baseline serum creatinine 2.0 with eGFR 25-30. During this admission has been diuresed without any recorded urine output while on lasix 80. Last seen by Dr. Maria 5/2022 for stable CKD3 with goal of treating DM, HTN, afib and diet at that time.     Nephrology consulted for evaluation for cardiac transplant

## 2022-11-18 NOTE — ASSESSMENT & PLAN NOTE
Patient is identified as having Combined Systolic and Diastolic heart failure that is Acute on Chronic. CHF is currently uncontrolled due to volume overload due to: Continued edema of extremities and Weight gain    TTE[11/17/22]  Summary   The left ventricle is severely enlarged with eccentric hypertrophy and severely decreased systolic function. The estimated ejection fraction is 10%.   The right ventricle is not well visualized but appears normal in size with moderately reduced systolic function.   Grade II left ventricular diastolic dysfunction.   Biatrial enlargement.   The estimated PA systolic pressure is 65 mmHg.   Elevated central venous pressure (15 mmHg).   Small posterolateral pericardial effusion.    Recent Labs   Lab 11/15/22  1700 11/16/22  0351 11/17/22  0055   TROPONINI 0.769* 0.780* 0.710*   BNP 4,104*  --   --      Home Medications: empagliflozin 25mg, furosemide 40mg qd, sacubitriL-valsartan   mg per tablet BID, metoprolol XL 100mg    Recent Labs     11/17/22  0055 11/17/22  0410 11/18/22  0528   BUN 46* 46* 45*   CREATININE 2.3* 2.2* 1.9*   CO2 24 25 26       RECOMMENDATIONS  -Given elevated CVP on recent TTE and improvement in labs on diuresis, recommend increasing to Furosemide 80mg IV TID  -GDMT/HTN: Continue Hydralazine-Isosorbide Dinitrate TID. Uptitrate as tolerated  -Cardiac diet with Fluid restriction at 1.5L with strict I/Os and daily weights  - Maintain on telemetry. Check Electrolytes, keep Mag >2 & K+ >4  -Preliminary Discharge med recs:   -Continue sacubitriL-valsartan   mg  BID.   -Continue metoprolol XL 100mg QD  -Change Jardiance from 25mg to 10mg qd due to renal function.  -Start Hydralazine-Isosorbide TID  -Diuretic dosing TBD.   -Spironolactone not recommended due to low eGFR/CKD  - Heart failure/HTS clinic follow up for HF management and HTS evaluation. Recommend inpatient Nephrology consult to initiate reevaluation for advanced therapies/heart transplant

## 2022-11-18 NOTE — CONSULTS
[facility] S[facility]  Nephrology  Consult Note    Patient Name: Hafsa Hawley  MRN: [id number]  Admission Date: 11/15/2022  Hospital Length of Stay: 3 days  Attending Provider: Karyn Parrish MD   Primary Care Physician: Cristobal Ann MD  Principal Problem:Acute on chronic respiratory failure with hypoxia    Inpatient consult to Nephrology  Consult performed by: Harshad Cronin MD  Consult ordered by: Karyn Parrish MD      Subjective:     HPI: 57 year old female with a history of HFrEF (25%) with ICD, CKD 3, morbid obesity, DM, MELISSA, HTN, pAfib on eliquis who presents with heart failure exacerbation. Baseline serum creatinine 2.0 with eGFR 25-30. During this admission has been diuresed without any recorded urine output while on lasix 80. Last seen by Dr. Maria 5/2022 for stable CKD3 with goal of treating DM, HTN, afib and diet at that time.     Nephrology consulted for evaluation for cardiac transplant      Past Medical History:   Diagnosis Date    Anemia     Anticoagulant long-term use     Arthritis     Atrial fibrillation     CKD (chronic kidney disease), stage IV 5/8/2018    Congestive heart failure     COPD (chronic obstructive pulmonary disease)     Deep vein thrombosis     elevated bilirubin d/t Gilbert's syndrome     confirmed by [place] genetic testing, evaluated by hepatology    Encounter for blood transfusion     GERD (gastroesophageal reflux disease)     Hypertension     Hypertensive cardiovascular-renal disease, stage 1-4 or unspecified chronic kidney disease, with heart failure 3/4/2022    Pheochromocytoma, malignant     Restrictive lung disease 4/26/2022    Right kidney mass     Sleep apnea     Thalassemia trait, alpha     Thyroid disease     Type 2 diabetes mellitus with hyperglycemia, without long-term current use of insulin 8/13/2020       Past Surgical History:   Procedure Laterality Date    APPENDECTOMY      BONE MARROW BIOPSY      CARDIAC DEFIBRILLATOR PLACEMENT Left 12/2016    CARDIAC ELECTROPHYSIOLOGY  "MAPPING AND ABLATION      CARDIAC ELECTROPHYSIOLOGY MAPPING AND ABLATION      COLONOSCOPY N/A 5/6/2022    Procedure: COLONOSCOPY;  Surgeon: Arely Betancourt MD;  Location: [facility] ENDO (2ND FLR);  Service: Endoscopy;  Laterality: N/A;  heart transplant candidate/ EF 25% - 2nd floor/ defib - [facility] - [facility]  Eliquis - per Dr. Cortez with [facility], Pt ok to hold Eliquis x 2 days prior-see media tab-outside correspondence dated 12/30/21  - [facility]  verbal instructions/portal instructions/email instructions - s    EYE SURGERY      due to running tears    FRACTURE SURGERY Left     hand 5th digit    HYSTERECTOMY      KNEE SURGERY Left 2016    hematoma    LIVER BIOPSY  10/24/2018    Minimal steatosis, predominantly macrovesicular, 1%, Minimal nonspecific portal inflammation, no fibrosis. No findings on biopsy to explain elevated bilirubin levels. Could be d/t Gilbert's =?- hemolysis    RIGHT HEART CATHETERIZATION Right 12/07/2021    Procedure: INSERTION, CATHETER, RIGHT HEART;  Surgeon: Irving Cardenas MD;  Location: [facility] CATH LAB;  Service: Cardiology;  Laterality: Right;    TRANSJUGULAR BIOPSY OF LIVER N/A 10/24/2018    Procedure: BIOPSY, LIVER, TRANSJUGULAR APPROACH;  Surgeon: [facility] Diagnostic Provider;  [facility];  Service: Radiology;  Laterality: N/A;       Review of patient's allergies indicates:   Allergen Reactions    Penicillins Hives    Iodinated contrast media Nausea And Vomiting    Oxycodone-acetaminophen Other (See Comments)     Nausea, Dizziness, Anxiety.  "I don't like how it makes me feel."   Given Hydromorphone 0.5mg IVP  Without problems.  Other reaction(s): Other (See Comments)    Diovan hct [valsartan-hydrochlorothiazide] Other (See Comments)     Causes coughing    Irinotecan      Pt has homozygosity for the TA7 promoter variant that places this individual at significantly increased risk for   severe neutropenia (grade 4) when treated with the standard dose of irinotecan (risk approximately " 50%).   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 Metabolism of other drugs not listed here may also be impacted by UGT1A1 enzyme activity.       Tramadol Nausea And Vomiting     Other reaction(s): Other (See Comments)     Current Facility-Administered Medications   Medication Frequency    albuterol-ipratropium 2.5 mg-0.5 mg/3 mL nebulizer solution 3 mL BID    allopurinoL tablet 100 mg Daily    ALPRAZolam tablet 1 mg BID PRN    amiodarone tablet 200 mg Daily    apixaban tablet 5 mg BID    atorvastatin tablet 40 mg QHS    dextromethorphan-guaiFENesin  mg per 12 hr tablet 2 tablet BID    dextrose 10% bolus 125 mL PRN    dextrose 10% bolus 250 mL PRN    ferrous sulfate tablet 1 each Daily    fluticasone furoate-vilanteroL 200-25 mcg/dose diskus inhaler 1 puff Daily    fluticasone propionate 50 mcg/actuation nasal spray 100 mcg Daily    furosemide injection 80 mg Q12H    glucagon (human recombinant) injection 1 mg PRN    glucose chewable tablet 16 g PRN    glucose chewable tablet 24 g PRN    hydrALAZINE tablet 25 mg TID    hydrocodone-apap 7.5-325 MG/15 ML oral solution 10 mL Q8H PRN    insulin aspart U-100 pen 0-5 Units QID (AC + HS) PRN    isosorbide dinitrate tablet 20 mg TID    melatonin tablet 6 mg Nightly PRN    metoprolol succinate (TOPROL-XL) 24 hr tablet 100 mg Daily    montelukast tablet 10 mg Daily    naloxone 0.4 mg/mL injection 0.02 mg PRN    nitroGLYCERIN SL tablet 0.4 mg Q5 Min PRN    pantoprazole EC tablet 40 mg Daily    polyethylene glycol packet 17 g Daily    prochlorperazine injection Soln 2.5 mg Q6H PRN    sacubitriL-valsartan  mg per tablet 1 tablet BID    sodium chloride 0.9% flush 10 mL PRN    sodium chloride 0.9% flush 10 mL Q12H PRN    tiotropium bromide 2.5 mcg/actuation inhaler 2 puff Daily     Facility-Administered Medications Ordered in Other Encounters    Medication Frequency    0.9%  NaCl infusion Continuous    vancomycin in dextrose 5 % 1 gram/250 mL IVPB 1,000 mg On Call Procedure     Family History       Problem Relation (Age of Onset)    Cancer Mother    Cirrhosis Brother    Diabetes Brother    Heart attack Father    Heart disease Father, Sister, Brother, Sister, Brother    Hypertension Father, Brother          Tobacco Use    Smoking status: Never    Smokeless tobacco: Never   Substance and Sexual Activity    Alcohol use: Yes     Comment: up to 1 yr ago drank 2-3 drinks on occasion but sporadic    Drug use: No    Sexual activity: Yes     Partners: Male     Review of Systems   Constitutional:  Negative for activity change, chills, fatigue and fever.   HENT:  Negative for congestion, nosebleeds and trouble swallowing.    Respiratory:  Positive for cough and shortness of breath. Negative for apnea, choking and chest tightness.    Cardiovascular:  Positive for chest pain and leg swelling.   Gastrointestinal:  Negative for abdominal distention, abdominal pain, constipation, diarrhea, nausea and vomiting.   Genitourinary:  Negative for decreased urine volume, difficulty urinating, dysuria and frequency.   Musculoskeletal:  Negative for arthralgias, back pain, joint swelling, neck pain and neck stiffness.   Skin:  Negative for rash and wound.   Neurological:  Positive for dizziness. Negative for seizures, syncope, weakness, light-headedness, numbness and headaches.   Psychiatric/Behavioral:  Negative for agitation, behavioral problems, confusion, hallucinations, self-injury and sleep disturbance. The patient is not nervous/anxious.    Objective:     Vital Signs (Most Recent):  Temp: 96.7 °F (35.9 °C) (11/18/22 1048)  Pulse: 70 (11/18/22 1517)  Resp: 16 (11/18/22 1356)  BP: (!) 107/56 (11/18/22 1048)  SpO2: (!) 92 % (11/18/22 1048)  O2 Device (Oxygen Therapy): nasal cannula (11/18/22 0802)   Vital Signs (24h Range):  Temp:  [96.7 °F (35.9 °C)-98 °F (36.7 °C)] 96.7 °F  (35.9 °C)  Pulse:  [61-86] 70  Resp:  [16-18] 16  SpO2:  [92 %-97 %] 92 %  BP: (107-138)/(51-84) 107/56     Weight: 100.2 kg (220 lb 14.4 oz) (11/18/22 1121)  Body mass index is 35.65 kg/m².  Body surface area is 2.16 meters squared.    I/O last 3 completed shifts:  In: -   Out: 3000 [Urine:3000]    Physical Exam  Constitutional:       General: She is not in acute distress.     Appearance: Normal appearance. She is obese. She is ill-appearing. She is not toxic-appearing or diaphoretic.   HENT:      Head: Normocephalic and atraumatic.      Nose: Nose normal.      Mouth/Throat:      Mouth: Mucous membranes are moist.   Eyes:      General: No scleral icterus.     Extraocular Movements: Extraocular movements intact.      Pupils: Pupils are equal, round, and reactive to light.   Cardiovascular:      Rate and Rhythm: Normal rate and regular rhythm.      Pulses: Normal pulses.      Heart sounds: Normal heart sounds. No murmur heard.    No friction rub. No gallop.   Pulmonary:      Effort: Pulmonary effort is normal.      Breath sounds: Normal breath sounds. No wheezing or rhonchi.   Chest:      Chest wall: No tenderness.   Abdominal:      General: Abdomen is flat. Bowel sounds are normal. There is no distension.      Palpations: Abdomen is soft.      Tenderness: There is no abdominal tenderness. There is no right CVA tenderness, left CVA tenderness, guarding or rebound.   Musculoskeletal:         General: Swelling present. No tenderness, deformity or signs of injury. Normal range of motion.      Cervical back: Normal range of motion and neck supple. No rigidity or tenderness.      Right lower leg: Edema present.      Left lower leg: Edema present.   Lymphadenopathy:      Cervical: No cervical adenopathy.   Skin:     General: Skin is warm and dry.      Coloration: Skin is not jaundiced or pale.      Findings: No erythema or rash.   Neurological:      General: No focal deficit present.      Mental Status: She is alert and  oriented to person, place, and time. Mental status is at baseline.      Cranial Nerves: No cranial nerve deficit.      Motor: No weakness.   Psychiatric:         Mood and Affect: Mood normal.         Behavior: Behavior normal.         Thought Content: Thought content normal.         Judgment: Judgment normal.       Assessment/Plan:     WASHINGTON (acute kidney injury)      Serum creatinine stable and improving  CKD stage 4 - HFrEF (25%), obesity, HTN, DM    Management of CKD   -aggressive risk factor modificaiton  -can consult kidney transplant        Thank you for your consult. I will sign off. Please contact us if you have any additional questions.    Harshad Cronin MD  Nephrology  [facility] Surg

## 2022-11-18 NOTE — PLAN OF CARE
Recommendations     1. Continue current diabetic diet- add low sodium diet restrictions- encourage adequate PO intake.      2. If PO intake <50%, add Boost Glucose Control BID.      3. RD following.     Goals: Will meet % EEN/EPN by next RD f/u.  Nutrition Goal Status: new  Communication of RD Recs:  (POC)    Kaila Dawson, Registration Eligible, Provisional LDN

## 2022-11-19 LAB
ALBUMIN SERPL BCP-MCNC: 3.5 G/DL (ref 3.5–5.2)
ANION GAP SERPL CALC-SCNC: 12 MMOL/L (ref 8–16)
BUN SERPL-MCNC: 40 MG/DL (ref 6–20)
CALCIUM SERPL-MCNC: 9.2 MG/DL (ref 8.7–10.5)
CHLORIDE SERPL-SCNC: 105 MMOL/L (ref 95–110)
CO2 SERPL-SCNC: 25 MMOL/L (ref 23–29)
CREAT SERPL-MCNC: 1.9 MG/DL (ref 0.5–1.4)
EST. GFR  (NO RACE VARIABLE): 30.4 ML/MIN/1.73 M^2
GLUCOSE SERPL-MCNC: 137 MG/DL (ref 70–110)
MAGNESIUM SERPL-MCNC: 1.8 MG/DL (ref 1.6–2.6)
PHOSPHATE SERPL-MCNC: 3 MG/DL (ref 2.7–4.5)
POCT GLUCOSE: 127 MG/DL (ref 70–110)
POCT GLUCOSE: 142 MG/DL (ref 70–110)
POCT GLUCOSE: 155 MG/DL (ref 70–110)
POCT GLUCOSE: 166 MG/DL (ref 70–110)
POTASSIUM SERPL-SCNC: 4 MMOL/L (ref 3.5–5.1)
SODIUM SERPL-SCNC: 142 MMOL/L (ref 136–145)

## 2022-11-19 PROCEDURE — 25000242 PHARM REV CODE 250 ALT 637 W/ HCPCS: Performed by: STUDENT IN AN ORGANIZED HEALTH CARE EDUCATION/TRAINING PROGRAM

## 2022-11-19 PROCEDURE — 11000001 HC ACUTE MED/SURG PRIVATE ROOM

## 2022-11-19 PROCEDURE — 99232 SBSQ HOSP IP/OBS MODERATE 35: CPT | Mod: ,,, | Performed by: INTERNAL MEDICINE

## 2022-11-19 PROCEDURE — 80069 RENAL FUNCTION PANEL: CPT | Performed by: INTERNAL MEDICINE

## 2022-11-19 PROCEDURE — 99900035 HC TECH TIME PER 15 MIN (STAT)

## 2022-11-19 PROCEDURE — 63600175 PHARM REV CODE 636 W HCPCS: Performed by: INTERNAL MEDICINE

## 2022-11-19 PROCEDURE — 25000003 PHARM REV CODE 250: Performed by: STUDENT IN AN ORGANIZED HEALTH CARE EDUCATION/TRAINING PROGRAM

## 2022-11-19 PROCEDURE — 36415 COLL VENOUS BLD VENIPUNCTURE: CPT | Performed by: INTERNAL MEDICINE

## 2022-11-19 PROCEDURE — 94761 N-INVAS EAR/PLS OXIMETRY MLT: CPT

## 2022-11-19 PROCEDURE — 27000221 HC OXYGEN, UP TO 24 HOURS

## 2022-11-19 PROCEDURE — 83735 ASSAY OF MAGNESIUM: CPT | Performed by: INTERNAL MEDICINE

## 2022-11-19 PROCEDURE — 99233 PR SUBSEQUENT HOSPITAL CARE,LEVL III: ICD-10-PCS | Mod: ,,, | Performed by: INTERNAL MEDICINE

## 2022-11-19 PROCEDURE — 99233 SBSQ HOSP IP/OBS HIGH 50: CPT | Mod: ,,, | Performed by: INTERNAL MEDICINE

## 2022-11-19 PROCEDURE — 94660 CPAP INITIATION&MGMT: CPT

## 2022-11-19 PROCEDURE — 25000003 PHARM REV CODE 250: Performed by: INTERNAL MEDICINE

## 2022-11-19 PROCEDURE — 94640 AIRWAY INHALATION TREATMENT: CPT

## 2022-11-19 PROCEDURE — 99232 PR SUBSEQUENT HOSPITAL CARE,LEVL II: ICD-10-PCS | Mod: ,,, | Performed by: INTERNAL MEDICINE

## 2022-11-19 RX ORDER — HYDRALAZINE HYDROCHLORIDE 50 MG/1
50 TABLET, FILM COATED ORAL 3 TIMES DAILY
Status: DISCONTINUED | OUTPATIENT
Start: 2022-11-19 | End: 2022-11-20

## 2022-11-19 RX ADMIN — FUROSEMIDE 80 MG: 10 INJECTION, SOLUTION INTRAVENOUS at 09:11

## 2022-11-19 RX ADMIN — IPRATROPIUM BROMIDE AND ALBUTEROL SULFATE 3 ML: 2.5; .5 SOLUTION RESPIRATORY (INHALATION) at 09:11

## 2022-11-19 RX ADMIN — FLUTICASONE FUROATE AND VILANTEROL TRIFENATATE 1 PUFF: 200; 25 POWDER RESPIRATORY (INHALATION) at 08:11

## 2022-11-19 RX ADMIN — FLUTICASONE PROPIONATE 100 MCG: 50 SPRAY, METERED NASAL at 08:11

## 2022-11-19 RX ADMIN — ALLOPURINOL 100 MG: 100 TABLET ORAL at 08:11

## 2022-11-19 RX ADMIN — APIXABAN 5 MG: 5 TABLET, FILM COATED ORAL at 09:11

## 2022-11-19 RX ADMIN — GUAIFENESIN AND DEXTROMETHORPHAN HYDROBROMIDE 2 TABLET: 600; 30 TABLET, EXTENDED RELEASE ORAL at 08:11

## 2022-11-19 RX ADMIN — IPRATROPIUM BROMIDE AND ALBUTEROL SULFATE 3 ML: 2.5; .5 SOLUTION RESPIRATORY (INHALATION) at 08:11

## 2022-11-19 RX ADMIN — TIOTROPIUM BROMIDE INHALATION SPRAY 2 PUFF: 3.12 SPRAY, METERED RESPIRATORY (INHALATION) at 08:11

## 2022-11-19 RX ADMIN — ATORVASTATIN CALCIUM 40 MG: 40 TABLET, FILM COATED ORAL at 09:11

## 2022-11-19 RX ADMIN — ISOSORBIDE DINITRATE 20 MG: 20 TABLET ORAL at 09:11

## 2022-11-19 RX ADMIN — MONTELUKAST 10 MG: 10 TABLET, FILM COATED ORAL at 08:11

## 2022-11-19 RX ADMIN — SACUBITRIL AND VALSARTAN 1 TABLET: 97; 103 TABLET, FILM COATED ORAL at 09:11

## 2022-11-19 RX ADMIN — PANTOPRAZOLE SODIUM 40 MG: 40 TABLET, DELAYED RELEASE ORAL at 08:11

## 2022-11-19 RX ADMIN — GUAIFENESIN AND DEXTROMETHORPHAN HYDROBROMIDE 2 TABLET: 600; 30 TABLET, EXTENDED RELEASE ORAL at 09:11

## 2022-11-19 RX ADMIN — FUROSEMIDE 80 MG: 10 INJECTION, SOLUTION INTRAVENOUS at 02:11

## 2022-11-19 RX ADMIN — APIXABAN 5 MG: 5 TABLET, FILM COATED ORAL at 08:11

## 2022-11-19 RX ADMIN — AMIODARONE HYDROCHLORIDE 200 MG: 200 TABLET ORAL at 08:11

## 2022-11-19 RX ADMIN — HYDRALAZINE HYDROCHLORIDE 50 MG: 50 TABLET ORAL at 09:11

## 2022-11-19 RX ADMIN — FERROUS SULFATE TAB 325 MG (65 MG ELEMENTAL FE) 1 EACH: 325 (65 FE) TAB at 08:11

## 2022-11-19 NOTE — SUBJECTIVE & OBJECTIVE
Interval History: No acute events overnight, afebrile, hemodynamically stable. She continues to reports improvement in her symptoms. Decreasing supplemental oxygen requirements.      Review of Systems   Constitutional: Positive for malaise/fatigue. Negative for chills and fever.   HENT:  Negative for congestion and sore throat.    Eyes:  Negative for vision loss in left eye and vision loss in right eye.   Cardiovascular:  Positive for chest pain (Improving), dyspnea on exertion (improving) and palpitations. Negative for leg swelling.   Respiratory:  Positive for cough (Improving on cough syrup) and shortness of breath (improving).    Musculoskeletal:  Negative for falls and muscle cramps.   Gastrointestinal:  Negative for constipation, diarrhea, nausea and vomiting.   Genitourinary:  Positive for frequency (on diuresis). Negative for incomplete emptying.   Neurological:  Negative for headaches and light-headedness.   Objective:     Vital Signs (Most Recent):  Temp: 96.2 °F (35.7 °C) (11/19/22 0500)  Pulse: 68 (11/19/22 0856)  Resp: 18 (11/19/22 0856)  BP: 110/61 (11/19/22 0500)  SpO2: 95 % (11/19/22 0856) Vital Signs (24h Range):  Temp:  [96.2 °F (35.7 °C)-98 °F (36.7 °C)] 96.2 °F (35.7 °C)  Pulse:  [61-92] 68  Resp:  [16-19] 18  SpO2:  [92 %-96 %] 95 %  BP: ()/(56-70) 110/61     Weight: 100.2 kg (220 lb 14.4 oz)  Body mass index is 35.65 kg/m².     SpO2: 95 %  O2 Device (Oxygen Therapy): nasal cannula      Intake/Output Summary (Last 24 hours) at 11/19/2022 0906  Last data filed at 11/18/2022 1524  Gross per 24 hour   Intake --   Output 1800 ml   Net -1800 ml       Lines/Drains/Airways       Peripheral Intravenous Line  Duration                  Peripheral IV - Single Lumen 11/15/22 1659 20 G Right Antecubital 3 days                    Physical Exam  Vitals and nursing note reviewed.   Constitutional:       General: She is not in acute distress.     Appearance: Normal appearance. She is obese. She is not  ill-appearing, toxic-appearing or diaphoretic.      Interventions: Nasal cannula in place.   HENT:      Head: Normocephalic and atraumatic.      Mouth/Throat:      Mouth: Mucous membranes are moist.   Eyes:      General: No scleral icterus.        Right eye: No discharge.         Left eye: No discharge.   Neck:      Vascular: JVD present.   Cardiovascular:      Rate and Rhythm: Normal rate and regular rhythm.      Heart sounds: Normal heart sounds.   Pulmonary:      Effort: Pulmonary effort is normal. No respiratory distress.      Breath sounds: Normal breath sounds.   Abdominal:      General: Abdomen is flat. Bowel sounds are normal.      Palpations: Abdomen is soft.      Tenderness: There is no abdominal tenderness.   Musculoskeletal:         General: Swelling present. No tenderness.      Cervical back: Normal range of motion. No rigidity.      Right lower leg: Edema (trace) present.      Left lower leg: Edema (trace) present.   Skin:     General: Skin is warm and dry.   Neurological:      Mental Status: She is alert and oriented to person, place, and time. Mental status is at baseline.   Psychiatric:         Mood and Affect: Mood normal.         Behavior: Behavior normal.         LABS:  Recent Labs   Lab 11/17/22  0055 11/17/22  0410 11/18/22  0528    141 141   K 3.7 3.6 3.6    107 106   CO2 24 25 26   BUN 46* 46* 45*   CREATININE 2.3* 2.2* 1.9*   * 150* 148*   ANIONGAP 11 9 9     Recent Labs   Lab 11/18/22  0528   MG 1.7   PHOS 3.5     Recent Labs   Lab 11/16/22  0351 11/17/22  0410 11/18/22  0528   AST 18 13 12   ALT 34 24 19   ALKPHOS 49* 44* 43*   BILITOT 2.0* 1.8* 1.6*   ALBUMIN 3.4* 3.1* 3.1*      Recent Labs   Lab 11/16/22  0351 11/17/22  0410 11/18/22  0528   WBC 7.83 7.57 7.45   HGB 7.8* 7.8* 7.9*   HCT 26.5* 26.1* 27.2*    196 178   GRAN 68.5  5.4 68.0  5.2 70.3  5.2     Recent Labs   Lab 11/16/22  0022   INR 1.1     Recent Labs   Lab 11/15/22  1700 11/16/22  0350  11/17/22  0055   TROPONINI 0.769* 0.780* 0.710*     Lab Results   Component Value Date    BNP 4,104 (H) 11/15/2022     (H) 11/02/2022    BNP 2,771 (H) 10/28/2022          IMAGING:  EKGs:    Results for orders placed or performed during the hospital encounter of 11/15/22   EKG 12-lead    Collection Time: 11/16/22  9:23 PM    Narrative    Test Reason : R07.9,    Vent. Rate : 077 BPM     Atrial Rate : 077 BPM     P-R Int : 202 ms          QRS Dur : 116 ms      QT Int : 428 ms       P-R-T Axes : -17 -36 091 degrees     QTc Int : 484 ms    Normal sinus rhythm  Left axis deviation  LVH with QRS widening and repolarization abnormality ( R in aVL , [facility]  product )  Abnormal ECG  When compared with ECG of 16-NOV-2022 07:58,  Premature ventricular complexes are no longer Present  Premature atrial complexes are no longer Present  Confirmed by Parviz Dewey MD ([id number]) on 11/17/2022 11:25:33 AM    Referred By: [email or web address]   SELF           Confirmed By:Parviz Dewey MD       TTE:  EF   Date Value Ref Range Status   11/17/2022 10 % Final   11/30/2021 25 % Final   09/17/2021 25 % Final         Significant Imaging: I have reviewed all pertinent imaging results/findings within the past 24 hours.      INPATIENT MEDICATIONS:  Scheduled Meds:   albuterol-ipratropium  3 mL Nebulization BID    allopurinoL  100 mg Oral Daily    amiodarone  200 mg Oral Daily    apixaban  5 mg Oral BID    atorvastatin  40 mg Oral QHS    dextromethorphan-guaiFENesin  mg  2 tablet Oral BID    ferrous sulfate  1 tablet Oral Daily    fluticasone furoate-vilanteroL  1 puff Inhalation Daily    fluticasone propionate  2 spray Each Nostril Daily    furosemide (LASIX) injection  80 mg Intravenous TID    hydrALAZINE  25 mg Oral TID    isosorbide dinitrate  20 mg Oral TID    metoprolol succinate  100 mg Oral Daily    montelukast  10 mg Oral Daily    pantoprazole  40 mg Oral Daily    polyethylene glycol  17 g Oral Daily    sacubitriL-valsartan  1 tablet  Oral BID    tiotropium bromide  2 puff Inhalation Daily     PRN Meds:ALPRAZolam, dextrose 10%, dextrose 10%, glucagon (human recombinant), glucose, glucose, hydrocodone-apap 7.5-325 MG/15 ML, insulin aspart U-100, melatonin, naloxone, nitroGLYCERIN, prochlorperazine, sodium chloride 0.9%, sodium chloride 0.9%

## 2022-11-19 NOTE — ASSESSMENT & PLAN NOTE
- Home- Jardiance and Glimepiride  - LDSSI  - POCT checks ordered   Recent Labs     11/17/22  1610 11/17/22  1932 11/18/22  0708 11/18/22  1108 11/18/22  1559 11/18/22 1936   POCTGLUCOSE 161* 177* 148* 162* 170* 190*

## 2022-11-19 NOTE — PLAN OF CARE
Patient AAOx4  Patient able to make needs known  Patient is currently sitting on sofa in room  Patient provided with new pair of non skid foot wear  IV line dressing cleaned and changed  Provided patient with d/c update per request  Patient denies pain or distress at this time  Call bell and personal items are within reach.

## 2022-11-19 NOTE — NURSING
Contacted care team regarding new order of Hydralazine 50mg PO informed that pt blood pressures has been running soft last bp 118/77. Informed that nurse did not feel comfortable to administer with lat bp reading.    Patient remains alert and oriented at this time  Denies pain or distress  Call bell and personal items within reach.

## 2022-11-19 NOTE — NURSING
Patient had a 9 beat run of V TACH. Patient with and implanted ICD. Patient assessed. In no apparent distress. Denies SOB. States she did have mild chest pain during run. VS assessed. Please see flow sheet. Dr Babcock notified. No orders received.

## 2022-11-19 NOTE — PROGRESS NOTES
[facility]  Cardiology  Progress Note    Patient Name: Hafsa Hawley  MRN: [id number]  Admission Date: 11/15/2022  Hospital Length of Stay: 4 days  Code Status: Full Code   Attending Physician: Karyn Parrish MD   Primary Care Physician: Cristobal Ann MD  Expected Discharge Date: 11/22/2022  Principal Problem:Acute on chronic combined systolic and diastolic heart failure    Subjective:       Interval History: No acute events overnight, afebrile, hemodynamically stable. She continues to reports improvement in her symptoms. Decreasing supplemental oxygen requirements.      Review of Systems   Constitutional: Positive for malaise/fatigue. Negative for chills and fever.   HENT:  Negative for congestion and sore throat.    Eyes:  Negative for vision loss in left eye and vision loss in right eye.   Cardiovascular:  Positive for chest pain (Improving), dyspnea on exertion (improving) and palpitations. Negative for leg swelling.   Respiratory:  Positive for cough (Improving on cough syrup) and shortness of breath (improving).    Musculoskeletal:  Negative for falls and muscle cramps.   Gastrointestinal:  Negative for constipation, diarrhea, nausea and vomiting.   Genitourinary:  Positive for frequency (on diuresis). Negative for incomplete emptying.   Neurological:  Negative for headaches and light-headedness.   Objective:     Vital Signs (Most Recent):  Temp: 96.2 °F (35.7 °C) (11/19/22 0500)  Pulse: 68 (11/19/22 0856)  Resp: 18 (11/19/22 0856)  BP: 110/61 (11/19/22 0500)  SpO2: 95 % (11/19/22 0856) Vital Signs (24h Range):  Temp:  [96.2 °F (35.7 °C)-98 °F (36.7 °C)] 96.2 °F (35.7 °C)  Pulse:  [61-92] 68  Resp:  [16-19] 18  SpO2:  [92 %-96 %] 95 %  BP: ()/(56-70) 110/61     Weight: 100.2 kg (220 lb 14.4 oz)  Body mass index is 35.65 kg/m².     SpO2: 95 %  O2 Device (Oxygen Therapy): nasal cannula      Intake/Output Summary (Last 24 hours) at 11/19/2022 0906  Last data filed at 11/18/2022 1524  Gross per 24  hour   Intake --   Output 1800 ml   Net -1800 ml       Lines/Drains/Airways       Peripheral Intravenous Line  Duration                  Peripheral IV - Single Lumen 11/15/22 1659 20 G Right Antecubital 3 days                    Physical Exam  Vitals and nursing note reviewed.   Constitutional:       General: She is not in acute distress.     Appearance: Normal appearance. She is obese. She is not ill-appearing, toxic-appearing or diaphoretic.      Interventions: Nasal cannula in place.   HENT:      Head: Normocephalic and atraumatic.      Mouth/Throat:      Mouth: Mucous membranes are moist.   Eyes:      General: No scleral icterus.        Right eye: No discharge.         Left eye: No discharge.   Neck:      Vascular: JVD present.   Cardiovascular:      Rate and Rhythm: Normal rate and regular rhythm.      Heart sounds: Normal heart sounds.   Pulmonary:      Effort: Pulmonary effort is normal. No respiratory distress.      Breath sounds: Normal breath sounds.   Abdominal:      General: Abdomen is flat. Bowel sounds are normal.      Palpations: Abdomen is soft.      Tenderness: There is no abdominal tenderness.   Musculoskeletal:         General: Swelling present. No tenderness.      Cervical back: Normal range of motion. No rigidity.      Right lower leg: Edema (trace) present.      Left lower leg: Edema (trace) present.   Skin:     General: Skin is warm and dry.   Neurological:      Mental Status: She is alert and oriented to person, place, and time. Mental status is at baseline.   Psychiatric:         Mood and Affect: Mood normal.         Behavior: Behavior normal.         LABS:  Recent Labs   Lab 11/17/22  0055 11/17/22 0410 11/18/22  0528    141 141   K 3.7 3.6 3.6    107 106   CO2 24 25 26   BUN 46* 46* 45*   CREATININE 2.3* 2.2* 1.9*   * 150* 148*   ANIONGAP 11 9 9     Recent Labs   Lab 11/18/22  0528   MG 1.7   PHOS 3.5     Recent Labs   Lab 11/16/22  0351 11/17/22  0410 11/18/22  0528    AST 18 13 12   ALT 34 24 19   ALKPHOS 49* 44* 43*   BILITOT 2.0* 1.8* 1.6*   ALBUMIN 3.4* 3.1* 3.1*      Recent Labs   Lab 11/16/22  0351 11/17/22  0410 11/18/22  0528   WBC 7.83 7.57 7.45   HGB 7.8* 7.8* 7.9*   HCT 26.5* 26.1* 27.2*    196 178   GRAN 68.5  5.4 68.0  5.2 70.3  5.2     Recent Labs   Lab 11/16/22  0022   INR 1.1     Recent Labs   Lab 11/15/22  1700 11/16/22  0351 11/17/22  0055   TROPONINI 0.769* 0.780* 0.710*     Lab Results   Component Value Date    BNP 4,104 (H) 11/15/2022     (H) 11/02/2022    BNP 2,771 (H) 10/28/2022          IMAGING:  EKGs:    Results for orders placed or performed during the hospital encounter of 11/15/22   EKG 12-lead    Collection Time: 11/16/22  9:23 PM    Narrative    Test Reason : R07.9,    Vent. Rate : 077 BPM     Atrial Rate : 077 BPM     P-R Int : 202 ms          QRS Dur : 116 ms      QT Int : 428 ms       P-R-T Axes : -17 -36 091 degrees     QTc Int : 484 ms    Normal sinus rhythm  Left axis deviation  LVH with QRS widening and repolarization abnormality ( R in aVL , [facility]  product )  Abnormal ECG  When compared with ECG of 16-NOV-2022 07:58,  Premature ventricular complexes are no longer Present  Premature atrial complexes are no longer Present  Confirmed by Parviz Dewey MD ([id number]) on 11/17/2022 11:25:33 AM    Referred By: [email or web address]   SELF           Confirmed By:Parviz Dewey MD       TTE:  EF   Date Value Ref Range Status   11/17/2022 10 % Final   11/30/2021 25 % Final   09/17/2021 25 % Final         Significant Imaging: I have reviewed all pertinent imaging results/findings within the past 24 hours.      INPATIENT MEDICATIONS:  Scheduled Meds:   albuterol-ipratropium  3 mL Nebulization BID    allopurinoL  100 mg Oral Daily    amiodarone  200 mg Oral Daily    apixaban  5 mg Oral BID    atorvastatin  40 mg Oral QHS    dextromethorphan-guaiFENesin  mg  2 tablet Oral BID    ferrous sulfate  1 tablet Oral Daily    fluticasone  furoate-vilanteroL  1 puff Inhalation Daily    fluticasone propionate  2 spray Each Nostril Daily    furosemide (LASIX) injection  80 mg Intravenous TID    hydrALAZINE  25 mg Oral TID    isosorbide dinitrate  20 mg Oral TID    metoprolol succinate  100 mg Oral Daily    montelukast  10 mg Oral Daily    pantoprazole  40 mg Oral Daily    polyethylene glycol  17 g Oral Daily    sacubitriL-valsartan  1 tablet Oral BID    tiotropium bromide  2 puff Inhalation Daily     PRN Meds:ALPRAZolam, dextrose 10%, dextrose 10%, glucagon (human recombinant), glucose, glucose, hydrocodone-apap 7.5-325 MG/15 ML, insulin aspart U-100, melatonin, naloxone, nitroGLYCERIN, prochlorperazine, sodium chloride 0.9%, sodium chloride 0.9%        Assessment and Plan:   * Acute on chronic combined systolic and diastolic heart failure  Patient is identified as having Combined Systolic and Diastolic heart failure that is Acute on Chronic. CHF is currently uncontrolled due to volume overload due to: Continued edema of extremities and Weight gain    TTE[11/17/22]  Summary   The left ventricle is severely enlarged with eccentric hypertrophy and severely decreased systolic function. The estimated ejection fraction is 10%.   The right ventricle is not well visualized but appears normal in size with moderately reduced systolic function.   Grade II left ventricular diastolic dysfunction.   Biatrial enlargement.   The estimated PA systolic pressure is 65 mmHg.   Elevated central venous pressure (15 mmHg).   Small posterolateral pericardial effusion.      Home Medications: empagliflozin 25mg, furosemide 40mg qd, sacubitriL-valsartan   mg per tablet BID, metoprolol XL 100mg    Recent Labs     11/17/22  0055 11/17/22  0410 11/18/22  0528   BUN 46* 46* 45*   CREATININE 2.3* 2.2* 1.9*   CO2 24 25 26       RECOMMENDATIONS  -Continue Furosemide 80mg IV TID. Daily CMP, strict I/Os.  -GDMT/HTN: Continue Hydralazine-Isosorbide Dinitrate TID.  Increase Hydralazine to 50mg. Uptitrate as tolerated  -Cardiac diet with Fluid restriction at 1.5L with strict I/Os and daily weights  - Maintain on telemetry. Check Electrolytes, keep Mag >2 & K+ >4  -Preliminary Discharge med recs:   -Continue sacubitriL-valsartan   mg  BID.   -Continue metoprolol XL 100mg QD  -Change Jardiance from 25mg to 10mg qd due to renal function.  -Start Hydralazine-Isosorbide TID  -Diuretic dosing TBD.   -Spironolactone not recommended due to low eGFR/CKD  - Heart failure/HTS clinic follow up for HF management and HTS evaluation.     Paroxysmal atrial fibrillation  Normal sinus rhythm on EKG    [id number] Score: 4    RECOMMENDATIONS  -Continue amiodarone  -Continue Toprol  -Anticoagulation: On apixaban 2.5mg BID at home(unclear why?). Does not meet criteria for dose adjustment, change to apixaban 5mg BID      Elevated troponin  Troponin remains flat at around 0.7-0.8. Likely secondary to CKD, chronic dilated cardiomyopathy, and demand ischemia secondary to HF.    Thanks for this consult. Cardiology will continue to follow.  Please contact us if any additional questions.      Cristobal Dsouza, DO  Cardiology  [facility]

## 2022-11-19 NOTE — ASSESSMENT & PLAN NOTE
Patient is identified as having Combined Systolic and Diastolic heart failure that is Acute on Chronic. CHF is currently uncontrolled due to volume overload due to: Continued edema of extremities and Weight gain    TTE[11/17/22]  Summary   The left ventricle is severely enlarged with eccentric hypertrophy and severely decreased systolic function. The estimated ejection fraction is 10%.   The right ventricle is not well visualized but appears normal in size with moderately reduced systolic function.   Grade II left ventricular diastolic dysfunction.   Biatrial enlargement.   The estimated PA systolic pressure is 65 mmHg.   Elevated central venous pressure (15 mmHg).   Small posterolateral pericardial effusion.      Home Medications: empagliflozin 25mg, furosemide 40mg qd, sacubitriL-valsartan   mg per tablet BID, metoprolol XL 100mg    Recent Labs     11/17/22  0055 11/17/22  0410 11/18/22  0528   BUN 46* 46* 45*   CREATININE 2.3* 2.2* 1.9*   CO2 24 25 26       RECOMMENDATIONS  -Continue Furosemide 80mg IV TID. Daily CMP, strict I/Os.  -GDMT/HTN: Continue Hydralazine-Isosorbide Dinitrate TID. Increase Hydralazine to 50mg. Uptitrate as tolerated  -Cardiac diet with Fluid restriction at 1.5L with strict I/Os and daily weights  - Maintain on telemetry. Check Electrolytes, keep Mag >2 & K+ >4  -Preliminary Discharge med recs:   -Continue sacubitriL-valsartan   mg  BID.   -Continue metoprolol XL 100mg QD  -Change Jardiance from 25mg to 10mg qd due to renal function.  -Start Hydralazine-Isosorbide TID  -Diuretic dosing TBD.   -Spironolactone not recommended due to low eGFR/CKD  - Heart failure/HTS clinic follow up for HF management and HTS evaluation.

## 2022-11-19 NOTE — PROGRESS NOTES
Hospital Medicine  Progress note    [place]: [facility] [place] [facility] [place] Karyn Parrish MD  Admit Date: 11/15/2022    Principal Problem:  Acute on chronic respiratory failure with hypoxia    Interval hx:  Still dyspneic. She still feels some swelling in abdomen    ROS   Respiratory: neg for cough, no wheezing  cardiovascular: neg for chest pain neg for palpitations  Gastrointestinal: neg for nausea neg for vomiting, neg for abdominal pain neg for diarrhea neg for constipation   Behavioral/Psych: neg for depression neg for anxiety    PEx  Temp:  [96.7 °F (35.9 °C)-98 °F (36.7 °C)]   Pulse:  [61-92]   Resp:  [16-18]   BP: ()/(51-84)   SpO2:  [92 %-97 %]     Intake/Output Summary (Last 24 hours) at 11/18/2022 2155  Last data filed at 11/18/2022 1524  Gross per 24 hour   Intake --   Output 1800 ml   Net -1800 ml         General Appearance: no acute distress, WD, obese  Heart: regular rate and rhythm, no heave, no BLE edema,  Respiratory: Normal respiratory effort, symmetric excursion, bibasilar crackles  Abdomen: Soft, non-tender; bowel sounds active  Skin: intact, no rash, no ulcers  Neurologic:  No focal numbness or weakness  Mental status: Alert, oriented x 4, affect appropriate     Recent Labs   Lab 11/16/22  0351 11/17/22  0410 11/18/22  0528   WBC 7.83 7.57 7.45   HGB 7.8* 7.8* 7.9*   HCT 26.5* 26.1* 27.2*    196 178       Recent Labs   Lab 11/16/22  0351 11/17/22  0055 11/17/22  0410 11/18/22  0528    138 141 141   K 3.9 3.7 3.6 3.6    103 107 106   CO2 23 24 25 26   BUN 43* 46* 46* 45*   CREATININE 2.1* 2.3* 2.2* 1.9*   * 163* 150* 148*   CALCIUM 9.2 8.9 8.9 8.9   MG 1.8 1.8 1.7 1.7   PHOS 3.5  --  3.7 3.5       Recent Labs   Lab 11/16/22  0022 11/16/22  0351 11/17/22  0410 11/18/22  0528   ALKPHOS  --  49* 44* 43*   ALT  --  34 24 19   AST  --  18 13 12   ALBUMIN  --  3.4* 3.1* 3.1*   PROT  --  6.0 5.6* 5.5*   BILITOT  --  2.0* 1.8* 1.6*   INR 1.1  --   --   --           Recent Labs   Lab  11/17/22  1610 11/17/22  1932 11/18/22  0708 11/18/22  1108 11/18/22  1559 11/18/22 1936   POCTGLUCOSE 161* 177* 148* 162* 170* 190*         Scheduled Meds:   albuterol-ipratropium  3 mL Nebulization BID    allopurinoL  100 mg Oral Daily    amiodarone  200 mg Oral Daily    apixaban  5 mg Oral BID    atorvastatin  40 mg Oral QHS    dextromethorphan-guaiFENesin  mg  2 tablet Oral BID    ferrous sulfate  1 tablet Oral Daily    fluticasone furoate-vilanteroL  1 puff Inhalation Daily    fluticasone propionate  2 spray Each Nostril Daily    furosemide (LASIX) injection  80 mg Intravenous Q12H    hydrALAZINE  25 mg Oral TID    isosorbide dinitrate  20 mg Oral TID    metoprolol succinate  100 mg Oral Daily    montelukast  10 mg Oral Daily    pantoprazole  40 mg Oral Daily    polyethylene glycol  17 g Oral Daily    sacubitriL-valsartan  1 tablet Oral BID    tiotropium bromide  2 puff Inhalation Daily     Continuous Infusions:  As Needed:  ALPRAZolam, dextrose 10%, dextrose 10%, glucagon (human recombinant), glucose, glucose, hydrocodone-apap 7.5-325 MG/15 ML, insulin aspart U-100, melatonin, naloxone, nitroGLYCERIN, prochlorperazine, sodium chloride 0.9%, sodium chloride 0.9%    Assessment and Plan  / Problems managed today    * Acute on chronic respiratory failure with hypoxia  Home O2- 2 L's, currently at baseline, however moderately volume overloaded with WASHINGTON likely 2/2 cardiorenal   - Diurese and trend kidney function   - presented w increased SB and work of breathing, volume overload, WASHINGTON and NSTEMI    Hypertensive cardiovascular-renal disease, stage 1-4 or unspecified chronic kidney disease, with heart failure  See above      Acute on chronic combined systolic and diastolic heart failure  Last EF 25%. Grade II DD  Clinically moderately volume overloaded   She is on her home O2, suspect quick turnaround, BNP likely elevated in setting of home Entresto      - Diuresis with Lasix 80 mg IV QD--> 80 mg BID  -  Continue GDMT   - Not a great candidate for aldactone given Cr near 2.5   11/16 - trend troponin 0.7 x two. will consult cardiology.  Appreciate recs. F/u Dr. Javier- has appoint ment next week. heart/ kidney transplant eval would be done as outpatient    WASHINGTON (acute kidney injury)  Baseline Creatinine: 1.8-2.1  - Creatinine on admission: 2.4  - Urine lytes (Urine Na, Urine Cr, Urine Urea if on diuretic) and UA ordered   - Consider US of the retroperitoneum  - PRN Bladder Scans ordered    11/16- cr 2.4-> 2.1 after IV laasix    Type 2 diabetes mellitus without complication, without long-term current use of insulin  - Home- Jardiance and Glimepiride  - LDSSI  - POCT checks ordered   No results for input(s): POCTGLUCOSE in the last 72 hours.      elevated bilirubin d/t Gilbert's syndrome  - Monitor CMP       ICD (implantable cardioverter-defibrillator) in place  - Tele       Depression due to physical illness  Home Med- Cymbalta for chronic medical illnesses, not compliant at present       Paroxysmal atrial fibrillation  - Continue amio  - Continue Toprol   - Increase Apixaban to correct dose (was on 2.5 mg BID, unclear why as she only meets 1/3 criteria for dose reduction)- may be worth investigating further, denies any GI bleeding but rather occasional bruising   11/16- HR stable      MELISSA (obstructive sleep apnea)  - CPAP ordered     Essential hypertension  Continue home meds     Elevated troponin  - Likely elevated in setting of CKD- repeat and trend to peak   - 0.7 x 2, consult Cardiology        Discharge Planning   CHIQUI: 11/22/2022     Code Status: Full Code   Is the patient medically ready for discharge?: No    Reason for patient still in hospital (select all that apply): Patient trending condition and Treatment           Diet:  low salt  GI PPx: protonix  DVT PPx:  apixaban  Airways: room air  Wounds:none    Goals of Care:  Return to prior functional status       Time (minutes) spent in care of the patient  (Greater than 1/2 spent in direct face-to-face contact and care coordination on unit) 35 min  Karyn Parrish MD

## 2022-11-20 LAB
ALBUMIN SERPL BCP-MCNC: 3.4 G/DL (ref 3.5–5.2)
ANION GAP SERPL CALC-SCNC: 10 MMOL/L (ref 8–16)
BUN SERPL-MCNC: 41 MG/DL (ref 6–20)
CALCIUM SERPL-MCNC: 9.3 MG/DL (ref 8.7–10.5)
CHLORIDE SERPL-SCNC: 104 MMOL/L (ref 95–110)
CO2 SERPL-SCNC: 27 MMOL/L (ref 23–29)
CREAT SERPL-MCNC: 1.9 MG/DL (ref 0.5–1.4)
EST. GFR  (NO RACE VARIABLE): 30.4 ML/MIN/1.73 M^2
GLUCOSE SERPL-MCNC: 130 MG/DL (ref 70–110)
MAGNESIUM SERPL-MCNC: 1.7 MG/DL (ref 1.6–2.6)
PHOSPHATE SERPL-MCNC: 3.1 MG/DL (ref 2.7–4.5)
POCT GLUCOSE: 141 MG/DL (ref 70–110)
POCT GLUCOSE: 152 MG/DL (ref 70–110)
POCT GLUCOSE: 171 MG/DL (ref 70–110)
POCT GLUCOSE: 227 MG/DL (ref 70–110)
POTASSIUM SERPL-SCNC: 3.3 MMOL/L (ref 3.5–5.1)
SODIUM SERPL-SCNC: 141 MMOL/L (ref 136–145)

## 2022-11-20 PROCEDURE — 83735 ASSAY OF MAGNESIUM: CPT | Performed by: INTERNAL MEDICINE

## 2022-11-20 PROCEDURE — 63600175 PHARM REV CODE 636 W HCPCS: Performed by: INTERNAL MEDICINE

## 2022-11-20 PROCEDURE — 27000221 HC OXYGEN, UP TO 24 HOURS

## 2022-11-20 PROCEDURE — 80069 RENAL FUNCTION PANEL: CPT | Performed by: INTERNAL MEDICINE

## 2022-11-20 PROCEDURE — 94761 N-INVAS EAR/PLS OXIMETRY MLT: CPT

## 2022-11-20 PROCEDURE — 25000003 PHARM REV CODE 250: Performed by: STUDENT IN AN ORGANIZED HEALTH CARE EDUCATION/TRAINING PROGRAM

## 2022-11-20 PROCEDURE — 99233 SBSQ HOSP IP/OBS HIGH 50: CPT | Mod: ,,, | Performed by: INTERNAL MEDICINE

## 2022-11-20 PROCEDURE — 99900035 HC TECH TIME PER 15 MIN (STAT)

## 2022-11-20 PROCEDURE — 99233 PR SUBSEQUENT HOSPITAL CARE,LEVL III: ICD-10-PCS | Mod: ,,, | Performed by: INTERNAL MEDICINE

## 2022-11-20 PROCEDURE — 25000003 PHARM REV CODE 250: Performed by: INTERNAL MEDICINE

## 2022-11-20 PROCEDURE — 99232 PR SUBSEQUENT HOSPITAL CARE,LEVL II: ICD-10-PCS | Mod: ,,, | Performed by: INTERNAL MEDICINE

## 2022-11-20 PROCEDURE — 94640 AIRWAY INHALATION TREATMENT: CPT

## 2022-11-20 PROCEDURE — 25000242 PHARM REV CODE 250 ALT 637 W/ HCPCS: Performed by: STUDENT IN AN ORGANIZED HEALTH CARE EDUCATION/TRAINING PROGRAM

## 2022-11-20 PROCEDURE — 94660 CPAP INITIATION&MGMT: CPT

## 2022-11-20 PROCEDURE — 99232 SBSQ HOSP IP/OBS MODERATE 35: CPT | Mod: ,,, | Performed by: INTERNAL MEDICINE

## 2022-11-20 PROCEDURE — 36415 COLL VENOUS BLD VENIPUNCTURE: CPT | Performed by: INTERNAL MEDICINE

## 2022-11-20 PROCEDURE — 11000001 HC ACUTE MED/SURG PRIVATE ROOM

## 2022-11-20 RX ORDER — MAGNESIUM SULFATE HEPTAHYDRATE 40 MG/ML
2 INJECTION, SOLUTION INTRAVENOUS
Status: COMPLETED | OUTPATIENT
Start: 2022-11-20 | End: 2022-11-20

## 2022-11-20 RX ORDER — POTASSIUM CHLORIDE 750 MG/1
30 CAPSULE, EXTENDED RELEASE ORAL
Status: DISCONTINUED | OUTPATIENT
Start: 2022-11-20 | End: 2022-11-22 | Stop reason: HOSPADM

## 2022-11-20 RX ORDER — ISOSORBIDE DINITRATE 20 MG/1
40 TABLET ORAL 3 TIMES DAILY
Status: DISCONTINUED | OUTPATIENT
Start: 2022-11-20 | End: 2022-11-22 | Stop reason: HOSPADM

## 2022-11-20 RX ADMIN — GUAIFENESIN AND DEXTROMETHORPHAN HYDROBROMIDE 2 TABLET: 600; 30 TABLET, EXTENDED RELEASE ORAL at 08:11

## 2022-11-20 RX ADMIN — ISOSORBIDE DINITRATE 40 MG: 20 TABLET ORAL at 09:11

## 2022-11-20 RX ADMIN — SACUBITRIL AND VALSARTAN 1 TABLET: 97; 103 TABLET, FILM COATED ORAL at 09:11

## 2022-11-20 RX ADMIN — HYDROCODONE BITARTRATE AND ACETAMINOPHEN 10 ML: 7.5; 325 SOLUTION ORAL at 09:11

## 2022-11-20 RX ADMIN — ATORVASTATIN CALCIUM 40 MG: 40 TABLET, FILM COATED ORAL at 09:11

## 2022-11-20 RX ADMIN — MONTELUKAST 10 MG: 10 TABLET, FILM COATED ORAL at 08:11

## 2022-11-20 RX ADMIN — HYDRALAZINE HYDROCHLORIDE 75 MG: 50 TABLET ORAL at 09:11

## 2022-11-20 RX ADMIN — APIXABAN 5 MG: 5 TABLET, FILM COATED ORAL at 09:11

## 2022-11-20 RX ADMIN — POTASSIUM CHLORIDE 30 MEQ: 10 CAPSULE, COATED, EXTENDED RELEASE ORAL at 11:11

## 2022-11-20 RX ADMIN — APIXABAN 5 MG: 5 TABLET, FILM COATED ORAL at 08:11

## 2022-11-20 RX ADMIN — ALLOPURINOL 100 MG: 100 TABLET ORAL at 08:11

## 2022-11-20 RX ADMIN — ISOSORBIDE DINITRATE 20 MG: 20 TABLET ORAL at 08:11

## 2022-11-20 RX ADMIN — SACUBITRIL AND VALSARTAN 1 TABLET: 97; 103 TABLET, FILM COATED ORAL at 08:11

## 2022-11-20 RX ADMIN — IPRATROPIUM BROMIDE AND ALBUTEROL SULFATE 3 ML: 2.5; .5 SOLUTION RESPIRATORY (INHALATION) at 08:11

## 2022-11-20 RX ADMIN — FLUTICASONE PROPIONATE 100 MCG: 50 SPRAY, METERED NASAL at 08:11

## 2022-11-20 RX ADMIN — MAGNESIUM SULFATE 2 G: 2 INJECTION INTRAVENOUS at 02:11

## 2022-11-20 RX ADMIN — FUROSEMIDE 80 MG: 10 INJECTION, SOLUTION INTRAVENOUS at 09:11

## 2022-11-20 RX ADMIN — METOPROLOL SUCCINATE 100 MG: 100 TABLET, EXTENDED RELEASE ORAL at 08:11

## 2022-11-20 RX ADMIN — HYDRALAZINE HYDROCHLORIDE 50 MG: 50 TABLET ORAL at 08:11

## 2022-11-20 RX ADMIN — IPRATROPIUM BROMIDE AND ALBUTEROL SULFATE 3 ML: 2.5; .5 SOLUTION RESPIRATORY (INHALATION) at 12:11

## 2022-11-20 RX ADMIN — FUROSEMIDE 80 MG: 10 INJECTION, SOLUTION INTRAVENOUS at 04:11

## 2022-11-20 RX ADMIN — AMIODARONE HYDROCHLORIDE 200 MG: 200 TABLET ORAL at 08:11

## 2022-11-20 RX ADMIN — FERROUS SULFATE TAB 325 MG (65 MG ELEMENTAL FE) 1 EACH: 325 (65 FE) TAB at 08:11

## 2022-11-20 RX ADMIN — TIOTROPIUM BROMIDE INHALATION SPRAY 2 PUFF: 3.12 SPRAY, METERED RESPIRATORY (INHALATION) at 08:11

## 2022-11-20 RX ADMIN — FLUTICASONE FUROATE AND VILANTEROL TRIFENATATE 1 PUFF: 200; 25 POWDER RESPIRATORY (INHALATION) at 08:11

## 2022-11-20 RX ADMIN — GUAIFENESIN AND DEXTROMETHORPHAN HYDROBROMIDE 2 TABLET: 600; 30 TABLET, EXTENDED RELEASE ORAL at 09:11

## 2022-11-20 RX ADMIN — MAGNESIUM SULFATE 2 G: 2 INJECTION INTRAVENOUS at 04:11

## 2022-11-20 RX ADMIN — PANTOPRAZOLE SODIUM 40 MG: 40 TABLET, DELAYED RELEASE ORAL at 08:11

## 2022-11-20 NOTE — PROGRESS NOTES
[facility] - Med Surg  Cardiology  Progress Note    Patient Name: Hafsa Hawley  MRN: [id number]  Admission Date: 11/15/2022  Hospital Length of Stay: 5 days  Code Status: Full Code   Attending Physician: Karyn Parrish MD   Primary Care Physician: Cristobal Ann MD  Expected Discharge Date: 11/22/2022  Principal Problem:Acute on chronic combined systolic and diastolic heart failure    Subjective:     Interval History: No acute events overnight, afebrile, hemodynamically stable. BP remains stable after increasing Hydralazine dose yesterday.      Review of Systems   Constitutional: Positive for malaise/fatigue. Negative for chills and fever.   Eyes:  Negative for vision loss in left eye and vision loss in right eye.   Cardiovascular:  Positive for chest pain (Improving, decreased frequency), dyspnea on exertion (improving) and palpitations. Negative for leg swelling.   Respiratory:  Positive for cough (Improving) and shortness of breath (improving).    Musculoskeletal:  Negative for falls and muscle cramps.   Gastrointestinal:  Negative for constipation, diarrhea, nausea and vomiting.   Genitourinary:  Positive for frequency (on diuresis). Negative for incomplete emptying.   Neurological:  Negative for dizziness and light-headedness.   Objective:     Vital Signs (Most Recent):  Temp: 98.5 °F (36.9 °C) (11/20/22 0715)  Pulse: 72 (11/20/22 0809)  Resp: 18 (11/20/22 0809)  BP: 123/68 (11/20/22 0715)  SpO2: (!) 93 % (11/20/22 0809)   Vital Signs (24h Range):  Temp:  [97 °F (36.1 °C)-98.5 °F (36.9 °C)] 98.5 °F (36.9 °C)  Pulse:  [61-73] 72  Resp:  [16-20] 18  SpO2:  [92 %-95 %] 93 %  BP: (109-128)/(66-77) 123/68     Weight: 100.2 kg (220 lb 14.4 oz)  Body mass index is 35.65 kg/m².     SpO2: (!) 93 %  O2 Device (Oxygen Therapy): nasal cannula      Intake/Output Summary (Last 24 hours) at 11/20/2022 1035  Last data filed at 11/20/2022 0754  Gross per 24 hour   Intake 690 ml   Output 5600 ml   Net -4910 ml        Lines/Drains/Airways       Peripheral Intravenous Line  Duration                  Peripheral IV - Single Lumen 11/15/22 1659 20 G Right Antecubital 4 days                    Physical Exam  Vitals and nursing note reviewed.   Constitutional:       General: She is not in acute distress.     Appearance: Normal appearance. She is obese. She is not ill-appearing, toxic-appearing or diaphoretic.      Interventions: Nasal cannula in place.   HENT:      Mouth/Throat:      Mouth: Mucous membranes are moist.   Cardiovascular:      Rate and Rhythm: Normal rate and regular rhythm.      Heart sounds: Normal heart sounds.   Pulmonary:      Effort: Pulmonary effort is normal. No respiratory distress.      Breath sounds: Normal breath sounds.   Abdominal:      General: Bowel sounds are normal.      Palpations: Abdomen is soft.      Tenderness: There is no abdominal tenderness.   Musculoskeletal:         General: Swelling (improving) present. No tenderness.      Cervical back: Normal range of motion. No rigidity.      Right lower leg: No edema.      Left lower leg: No edema.   Skin:     General: Skin is warm and dry.   Neurological:      Mental Status: She is alert and oriented to person, place, and time. Mental status is at baseline.   Psychiatric:         Mood and Affect: Mood normal.         Behavior: Behavior normal.           LABS:  Recent Labs   Lab 11/18/22  0528 11/19/22  1154 11/20/22  0515    142 141   K 3.6 4.0 3.3*    105 104   CO2 26 25 27   BUN 45* 40* 41*   CREATININE 1.9* 1.9* 1.9*   * 137* 130*   ANIONGAP 9 12 10     Recent Labs   Lab 11/20/22  0515   MG 1.7   PHOS 3.1     Recent Labs   Lab 11/16/22  0351 11/17/22  0410 11/18/22  0528 11/19/22  1154 11/20/22  0515   AST 18 13 12  --   --    ALT 34 24 19  --   --    ALKPHOS 49* 44* 43*  --   --    BILITOT 2.0* 1.8* 1.6*  --   --    ALBUMIN 3.4* 3.1* 3.1*   < > 3.4*    < > = values in this interval not displayed.      Recent Labs   Lab  11/16/22  0351 11/17/22  0410 11/18/22  0528   WBC 7.83 7.57 7.45   HGB 7.8* 7.8* 7.9*   HCT 26.5* 26.1* 27.2*    196 178   GRAN 68.5  5.4 68.0  5.2 70.3  5.2     Recent Labs   Lab 11/16/22  0022   INR 1.1     Recent Labs   Lab 11/15/22  1700 11/16/22  0351 11/17/22  0055   TROPONINI 0.769* 0.780* 0.710*     Lab Results   Component Value Date    BNP 4,104 (H) 11/15/2022     (H) 11/02/2022    BNP 2,771 (H) 10/28/2022            IMAGING:  EKGs:  Results for orders placed or performed during the hospital encounter of 11/15/22   EKG 12-lead    Collection Time: 11/16/22  9:23 PM    Narrative    Test Reason : R07.9,    Vent. Rate : 077 BPM     Atrial Rate : 077 BPM     P-R Int : 202 ms          QRS Dur : 116 ms      QT Int : 428 ms       P-R-T Axes : -17 -36 091 degrees     QTc Int : 484 ms    Normal sinus rhythm  Left axis deviation  LVH with QRS widening and repolarization abnormality ( R in aVL , [facility]  product )  Abnormal ECG  When compared with ECG of 16-NOV-2022 07:58,  Premature ventricular complexes are no longer Present  Premature atrial complexes are no longer Present  Confirmed by Parviz Dewey MD ([id number]) on 11/17/2022 11:25:33 AM    Referred By: [email or web address]   SELF           Confirmed By:Parviz Dewey MD           TTE:  EF   Date Value Ref Range Status   11/17/2022 10 % Final   11/30/2021 25 % Final   09/17/2021 25 % Final         Significant Imaging: I have reviewed all pertinent imaging results/findings within the past 24 hours.      INPATIENT MEDICATIONS:    Scheduled Meds:   albuterol-ipratropium  3 mL Nebulization BID    allopurinoL  100 mg Oral Daily    amiodarone  200 mg Oral Daily    apixaban  5 mg Oral BID    atorvastatin  40 mg Oral QHS    dextromethorphan-guaiFENesin  mg  2 tablet Oral BID    ferrous sulfate  1 tablet Oral Daily    fluticasone furoate-vilanteroL  1 puff Inhalation Daily    fluticasone propionate  2 spray Each Nostril Daily    furosemide (LASIX)  injection  80 mg Intravenous TID    hydrALAZINE  50 mg Oral TID    isosorbide dinitrate  20 mg Oral TID    metoprolol succinate  100 mg Oral Daily    montelukast  10 mg Oral Daily    pantoprazole  40 mg Oral Daily    polyethylene glycol  17 g Oral Daily    sacubitriL-valsartan  1 tablet Oral BID    tiotropium bromide  2 puff Inhalation Daily     PRN Meds:ALPRAZolam, dextrose 10%, dextrose 10%, glucagon (human recombinant), glucose, glucose, hydrocodone-apap 7.5-325 MG/15 ML, insulin aspart U-100, melatonin, naloxone, nitroGLYCERIN, prochlorperazine, sodium chloride 0.9%, sodium chloride 0.9%        Assessment and Plan:   * Acute on chronic combined systolic and diastolic heart failure  Patient is identified as having Combined Systolic and Diastolic heart failure that is Acute on Chronic. CHF is currently uncontrolled due to volume overload due to: Continued edema of extremities and Weight gain    TTE[11/17/22]  Summary   The left ventricle is severely enlarged with eccentric hypertrophy and severely decreased systolic function. The estimated ejection fraction is 10%.   The right ventricle is not well visualized but appears normal in size with moderately reduced systolic function.   Grade II left ventricular diastolic dysfunction.   Biatrial enlargement.   The estimated PA systolic pressure is 65 mmHg.   Elevated central venous pressure (15 mmHg).   Small posterolateral pericardial effusion.      Home Medications: empagliflozin 25mg, furosemide 40mg qd, sacubitriL-valsartan   mg per tablet BID, metoprolol XL 100mg    Recent Labs     11/18/22  0528 11/19/22  1154 11/20/22  0515   BUN 45* 40* 41*   CREATININE 1.9* 1.9* 1.9*   CO2 26 25 27       RECOMMENDATIONS  -Continue Furosemide 80mg IV TID. Daily CMP, strict I/Os.  -GDMT/HTN: Continue Hydralazine-Isosorbide Dinitrate TID. Increase Hydralazine to 75mg and increase Isosorbide to 40mg. Uptitrate as tolerated  -Cardiac diet with Fluid restriction  at 1.5L with strict I/Os and daily weights   - Maintain on telemetry. Check Electrolytes, keep Mag >2 & K+ >4  -Preliminary Discharge med recs:   -Continue sacubitriL-valsartan   mg  BID.   -Continue metoprolol XL 100mg QD  -Change Jardiance from 25mg to 10mg qd due to renal function.  -Start Hydralazine-Isosorbide TID  -Diuretic dosing TBD.   -Spironolactone not recommended due to low eGFR/CKD  - Heart failure/HTS clinic follow up for HF management and HTS evaluation.     Paroxysmal atrial fibrillation  Normal sinus rhythm on EKG    [id number] Score: 4    RECOMMENDATIONS  -Continue amiodarone  -Continue Toprol  -Anticoagulation: On apixaban 2.5mg BID at home(unclear why?). Does not meet criteria for dose adjustment, change to apixaban 5mg BID      Elevated troponin  Troponin remains flat at around 0.7-0.8. Likely secondary to CKD, chronic dilated cardiomyopathy, and demand ischemia secondary to HF.    Thanks for this consult. Cardiology will continue to follow.  Please contact us if any additional questions.      Cristobal Dsouza, DO  Cardiology  [facility] - Med S[facility]

## 2022-11-20 NOTE — PROGRESS NOTES
Hospital Medicine  Progress note    Team: [facility] [place] [facility] Karyn Parrish MD  Admit Date: 11/15/2022    Principal Problem:  Acute on chronic combined systolic and diastolic heart failure    Interval hx:  Symptoms improved. She is now on room air    ROS   Respiratory: neg for cough, no wheezing  cardiovascular: neg for chest pain neg for palpitations  Gastrointestinal: neg for nausea neg for vomiting, neg for abdominal pain neg for diarrhea neg for constipation   Behavioral/Psych: neg for depression neg for anxiety    PEx  Temp:  [97.3 °F (36.3 °C)-98.5 °F (36.9 °C)]   Pulse:  [61-74]   Resp:  [16-20]   BP: (105-128)/(62-74)   SpO2:  [92 %-95 %]     Intake/Output Summary (Last 24 hours) at 11/20/2022 1547  Last data filed at 11/20/2022 1245  Gross per 24 hour   Intake 630 ml   Output 4300 ml   Net -3670 ml         General Appearance: no acute distress, WD, obese  Heart: regular rate and rhythm, no heave, no edema of BLE  Respiratory: Normal respiratory effort, symmetric excursion, bibasilar crackles minimal  Abdomen: Soft, non-tender; bowel sounds active  Skin: intact, no rash, no ulcers  Neurologic:  No focal numbness or weakness  Mental status: Alert, oriented x 4, affect appropriate     Recent Labs   Lab 11/16/22  0351 11/17/22 0410 11/18/22  0528   WBC 7.83 7.57 7.45   HGB 7.8* 7.8* 7.9*   HCT 26.5* 26.1* 27.2*    196 178       Recent Labs   Lab 11/18/22  0528 11/19/22  1154 11/20/22  0515    142 141   K 3.6 4.0 3.3*    105 104   CO2 26 25 27   BUN 45* 40* 41*   CREATININE 1.9* 1.9* 1.9*   * 137* 130*   CALCIUM 8.9 9.2 9.3   MG 1.7 1.8 1.7   PHOS 3.5 3.0 3.1       Recent Labs   Lab 11/16/22  0022 11/16/22  0351 11/17/22  0410 11/18/22  0528 11/19/22  1154 11/20/22  0515   ALKPHOS  --  49* 44* 43*  --   --    ALT  --  34 24 19  --   --    AST  --  18 13 12  --   --    ALBUMIN  --  3.4* 3.1* 3.1* 3.5 3.4*   PROT  --  6.0 5.6* 5.5*  --   --    BILITOT  --  2.0* 1.8* 1.6*  --   --    INR 1.1   --   --   --   --   --           Recent Labs   Lab 11/19/22  0707 11/19/22  1117 11/19/22  1626 11/19/22  1957 11/20/22  0716 11/20/22  1121   POCTGLUCOSE 127* 166* 155* 142* 152* 171*         Scheduled Meds:   albuterol-ipratropium  3 mL Nebulization BID    allopurinoL  100 mg Oral Daily    amiodarone  200 mg Oral Daily    apixaban  5 mg Oral BID    atorvastatin  40 mg Oral QHS    dextromethorphan-guaiFENesin  mg  2 tablet Oral BID    ferrous sulfate  1 tablet Oral Daily    fluticasone furoate-vilanteroL  1 puff Inhalation Daily    fluticasone propionate  2 spray Each Nostril Daily    furosemide (LASIX) injection  80 mg Intravenous TID    hydrALAZINE  75 mg Oral TID    isosorbide dinitrate  40 mg Oral TID    magnesium sulfate IVPB  2 g Intravenous Q2H    metoprolol succinate  100 mg Oral Daily    montelukast  10 mg Oral Daily    pantoprazole  40 mg Oral Daily    polyethylene glycol  17 g Oral Daily    potassium chloride  30 mEq Oral with breakfast    sacubitriL-valsartan  1 tablet Oral BID    tiotropium bromide  2 puff Inhalation Daily     Continuous Infusions:  As Needed:  ALPRAZolam, dextrose 10%, dextrose 10%, glucagon (human recombinant), glucose, glucose, hydrocodone-apap 7.5-325 MG/15 ML, insulin aspart U-100, melatonin, naloxone, nitroGLYCERIN, prochlorperazine, sodium chloride 0.9%, sodium chloride 0.9%    Assessment and Plan  / Problems managed today    * Acute on chronic combined systolic and diastolic heart failure  Last EF 25%. Grade II DD  Clinically moderately volume overloaded   She is on her home O2, suspect quick turnaround, BNP likely elevated in setting of home Entresto      - Diuresis with Lasix 80 mg IV QD--> 80 mg BID --> 80 mg TID  - Continue GDMT   - Not a great candidate for aldactone given Cr near 2.5   11/16 - trend troponin 0.7 x two. will consult cardiology.  Appreciate recs. F/u Dr. Javier- has appoint ment next week. heart/ kidney transplant eval would be done as  outpatient    Acute on chronic respiratory failure with hypoxia  Home O2- 2 L's, currently at baseline, however moderately volume overloaded with WASHINGTON likely 2/2 cardiorenal   - Diurese and trend kidney function   - presented w increased SB and work of breathing, volume overload, W[place] and NSTEMI    Hypertensive cardiovascular-renal disease, stage 1-4 or unspecified chronic kidney disease, with heart failure  See above      WASHINGTON (acute kidney injury)  Baseline Creatinine: 1.8-2.1  - Creatinine on admission: 2.4  - Urine lytes (Urine Na, Urine Cr, Urine Urea if on diuretic) and UA ordered   - Consider US of the retroperitoneum  - PRN Bladder Scans ordered    11/16- cr 2.4-> 2.1 after IV laasix    Type 2 diabetes mellitus without complication, without long-term current use of insulin  - Home- Jardiance and Glimepiride  - LDSSI  - POCT checks ordered   Recent Labs     11/17/22  1610 11/17/22  1932 11/18/22  0708 11/18/22  1108 11/18/22  1559 11/18/22  1936   POCTGLUCOSE 161* 177* 148* 162* 170* 190*       elevated bilirubin d/t Gilbert's syndrome  - Monitor CMP       ICD (implantable cardioverter-defibrillator) in place  - Tele       Depression due to physical illness  Home Med- Cymbalta for chronic medical illnesses, not compliant at present       Paroxysmal atrial fibrillation  - Continue amio  - Continue Toprol   - Increase Apixaban to correct dose (was on 2.5 mg BID, unclear why as she only meets 1/3 criteria for dose reduction)- may be worth investigating further, denies any GI bleeding but rather occasional bruising   11/16- HR stable      MELISSA (obstructive sleep apnea)  - CPAP ordered     Essential hypertension  Continue home meds     Elevated troponin  - Likely elevated in setting of CKD- repeat and trend to peak   - 0.7 x 2, consult Cardiology        Discharge Planning   CHIQUI: 11/22/2022     Code Status: Full Code   Is the patient medically ready for discharge?: No    Reason for patient still in hospital (select all  that apply): Patient trending condition and Treatment           Diet:  low salt  GI PPx: protonix  DVT PPx:  apixaban  Airways: room air  Wounds:none    Goals of Care:  Return to prior functional status       Time (minutes) spent in care of the patient (Greater than 1/2 spent in direct face-to-face contact and care coordination on unit) 35 min  Karyn Parrish MD

## 2022-11-20 NOTE — NURSING
Contacted resp for patient nebulizer treatment  They will visit the unit, patient did not wish to obtain treatment during first attempt she was eating breakfast. Understanding verbalized.       Patient given the okay to ambulate in the [facility]rez. Informed patient to take her time and begin with short distances and build up. Patient informed if she feels sob, dizzy or pain during ambulation to contact number . Patient informed to wear non skid footwear    Patient verbalized understanding.

## 2022-11-20 NOTE — PROGRESS NOTES
Hospital Medicine  Progress note    Team: [facility] Karyn Parrish MD  Admit Date: 11/15/2022    Principal Problem:  Acute on chronic combined systolic and diastolic heart failure    Interval hx:  Symptoms improved    ROS   Respiratory: neg for cough, no wheezing  cardiovascular: neg for chest pain neg for palpitations  Gastrointestinal: neg for nausea neg for vomiting, neg for abdominal pain neg for diarrhea neg for constipation   Behavioral/Psych: neg for depression neg for anxiety    PEx  Temp:  [96.2 °F (35.7 °C)-98 °F (36.7 °C)]   Pulse:  [64-73]   Resp:  [16-19]   BP: (110-128)/(57-77)   SpO2:  [92 %-96 %]     Intake/Output Summary (Last 24 hours) at 11/19/2022 2244  Last data filed at 11/19/2022 1739  Gross per 24 hour   Intake 690 ml   Output 2600 ml   Net -1910 ml         General Appearance: no acute distress, WD, obese  Heart: regular rate and rhythm, no heave, no BLE edema,  Respiratory: Normal respiratory effort, symmetric excursion, bibasilar crackles imporved  Abdomen: Soft, non-tender; bowel sounds active  Skin: intact, no rash, no ulcers  Neurologic:  No focal numbness or weakness  Mental status: Alert, oriented x 4, affect appropriate     Recent Labs   Lab 11/16/22  0351 11/17/22 0410 11/18/22  0528   WBC 7.83 7.57 7.45   HGB 7.8* 7.8* 7.9*   HCT 26.5* 26.1* 27.2*    196 178       Recent Labs   Lab 11/17/22  0410 11/18/22  0528 11/19/22  1154    141 142   K 3.6 3.6 4.0    106 105   CO2 25 26 25   BUN 46* 45* 40*   CREATININE 2.2* 1.9* 1.9*   * 148* 137*   CALCIUM 8.9 8.9 9.2   MG 1.7 1.7 1.8   PHOS 3.7 3.5 3.0       Recent Labs   Lab 11/16/22  0022 11/16/22  0351 11/17/22  0410 11/18/22  0528 11/19/22  1154   ALKPHOS  --  49* 44* 43*  --    ALT  --  34 24 19  --    AST  --  18 13 12  --    ALBUMIN  --  3.4* 3.1* 3.1* 3.5   PROT  --  6.0 5.6* 5.5*  --    BILITOT  --  2.0* 1.8* 1.6*  --    INR 1.1  --   --   --   --           Recent Labs   Lab 11/18/22  1831  11/18/22  1936 11/19/22  0707 11/19/22  1117 11/19/22  1626 11/19/22 1957   POCTGLUCOSE 170* 190* 127* 166* 155* 142*         Scheduled Meds:   albuterol-ipratropium  3 mL Nebulization BID    allopurinoL  100 mg Oral Daily    amiodarone  200 mg Oral Daily    apixaban  5 mg Oral BID    atorvastatin  40 mg Oral QHS    dextromethorphan-guaiFENesin  mg  2 tablet Oral BID    ferrous sulfate  1 tablet Oral Daily    fluticasone furoate-vilanteroL  1 puff Inhalation Daily    fluticasone propionate  2 spray Each Nostril Daily    furosemide (LASIX) injection  80 mg Intravenous TID    hydrALAZINE  50 mg Oral TID    isosorbide dinitrate  20 mg Oral TID    metoprolol succinate  100 mg Oral Daily    montelukast  10 mg Oral Daily    pantoprazole  40 mg Oral Daily    polyethylene glycol  17 g Oral Daily    sacubitriL-valsartan  1 tablet Oral BID    tiotropium bromide  2 puff Inhalation Daily     Continuous Infusions:  As Needed:  ALPRAZolam, dextrose 10%, dextrose 10%, glucagon (human recombinant), glucose, glucose, hydrocodone-apap 7.5-325 MG/15 ML, insulin aspart U-100, melatonin, naloxone, nitroGLYCERIN, prochlorperazine, sodium chloride 0.9%, sodium chloride 0.9%    Assessment and Plan  / Problems managed today    * Acute on chronic combined systolic and diastolic heart failure  Last EF 25%. Grade II DD  Clinically moderately volume overloaded   She is on her home O2, suspect quick turnaround, BNP likely elevated in setting of home Entresto      - Diuresis with Lasix 80 mg IV QD--> 80 mg BID --> 80 mg TID  - Continue GDMT   - Not a great candidate for aldactone given Cr near 2.5   11/16 - trend troponin 0.7 x two. will consult cardiology.  Appreciate recs. F/u Dr. Javier- has appoint ment next week. heart/ kidney transplant eval would be done as outpatient    Acute on chronic respiratory failure with hypoxia  Home O2- 2 L's, currently at baseline, however moderately volume overloaded with WASHINGTON likely 2/2 cardiorenal   -  Diurese and trend kidney function   - presented w increased SB and work of breathing, volume overload, WASHINGTON and NSTEMI    Hypertensive cardiovascular-renal disease, stage 1-4 or unspecified chronic kidney disease, with heart failure  See above      WASHINGTON (acute kidney injury)  Baseline Creatinine: 1.8-2.1  - Creatinine on admission: 2.4  - Urine lytes (Urine Na, Urine Cr, Urine Urea if on diuretic) and UA ordered   - Consider US of the retroperitoneum  - PRN Bladder Scans ordered    11/16- cr 2.4-> 2.1 after IV laasix    Type 2 diabetes mellitus without complication, without long-term current use of insulin  - Home- Jardiance and Glimepiride  - LDSSI  - POCT checks ordered   Recent Labs     11/17/22  1610 11/17/22  1932 11/18/22  0708 11/18/22  1108 11/18/22  1559 11/18/22  1936   POCTGLUCOSE 161* 177* 148* 162* 170* 190*       elevated bilirubin d/t Gilbert's syndrome  - Monitor CMP       ICD (implantable cardioverter-defibrillator) in place  - Tele       Depression due to physical illness  Home Med- Cymbalta for chronic medical illnesses, not compliant at present       Paroxysmal atrial fibrillation  - Continue amio  - Continue Toprol   - Increase Apixaban to correct dose (was on 2.5 mg BID, unclear why as she only meets 1/3 criteria for dose reduction)- may be worth investigating further, denies any GI bleeding but rather occasional bruising   11/16- HR stable      MELISSA (obstructive sleep apnea)  - CPAP ordered     Essential hypertension  Continue home meds     Elevated troponin  - Likely elevated in setting of CKD- repeat and trend to peak   - 0.7 x 2, consult Cardiology        Discharge Planning   CHIQUI: 11/22/2022     Code Status: Full Code   Is the patient medically ready for discharge?: No    Reason for patient still in hospital (select all that apply): Patient trending condition and Treatment           Diet:  low salt  GI PPx: protonix  DVT PPx:  apixaban  Airways: room air  Wounds:none    Goals of Care:  Return  to prior functional status       Time (minutes) spent in care of the patient (Greater than 1/2 spent in direct face-to-face contact and care coordination on unit) 35 min  Karyn Parrish MD

## 2022-11-20 NOTE — SUBJECTIVE & OBJECTIVE
Interval History: No acute events overnight, afebrile, hemodynamically stable. BP remains stable after increasing Hydralazine dose yesterday.      Review of Systems   Constitutional: Positive for malaise/fatigue. Negative for chills and fever.   Eyes:  Negative for vision loss in left eye and vision loss in right eye.   Cardiovascular:  Positive for chest pain (Improving, decreased frequency), dyspnea on exertion (improving) and palpitations. Negative for leg swelling.   Respiratory:  Positive for cough (Improving) and shortness of breath (improving).    Musculoskeletal:  Negative for falls and muscle cramps.   Gastrointestinal:  Negative for constipation, diarrhea, nausea and vomiting.   Genitourinary:  Positive for frequency (on diuresis). Negative for incomplete emptying.   Neurological:  Negative for dizziness and light-headedness.   Objective:     Vital Signs (Most Recent):  Temp: 98.5 °F (36.9 °C) (11/20/22 0715)  Pulse: 72 (11/20/22 0809)  Resp: 18 (11/20/22 0809)  BP: 123/68 (11/20/22 0715)  SpO2: (!) 93 % (11/20/22 0809)   Vital Signs (24h Range):  Temp:  [97 °F (36.1 °C)-98.5 °F (36.9 °C)] 98.5 °F (36.9 °C)  Pulse:  [61-73] 72  Resp:  [16-20] 18  SpO2:  [92 %-95 %] 93 %  BP: (109-128)/(66-77) 123/68     Weight: 100.2 kg (220 lb 14.4 oz)  Body mass index is 35.65 kg/m².     SpO2: (!) 93 %  O2 Device (Oxygen Therapy): nasal cannula      Intake/Output Summary (Last 24 hours) at 11/20/2022 1035  Last data filed at 11/20/2022 0754  Gross per 24 hour   Intake 690 ml   Output 5600 ml   Net -4910 ml       Lines/Drains/Airways       Peripheral Intravenous Line  Duration                  Peripheral IV - Single Lumen 11/15/22 1659 20 G Right Antecubital 4 days                    Physical Exam  Vitals and nursing note reviewed.   Constitutional:       General: She is not in acute distress.     Appearance: Normal appearance. She is obese. She is not ill-appearing, toxic-appearing or diaphoretic.      Interventions:  Nasal cannula in place.   HENT:      Mouth/Throat:      Mouth: Mucous membranes are moist.   Cardiovascular:      Rate and Rhythm: Normal rate and regular rhythm.      Heart sounds: Normal heart sounds.   Pulmonary:      Effort: Pulmonary effort is normal. No respiratory distress.      Breath sounds: Normal breath sounds.   Abdominal:      General: Bowel sounds are normal.      Palpations: Abdomen is soft.      Tenderness: There is no abdominal tenderness.   Musculoskeletal:         General: Swelling (improving) present. No tenderness.      Cervical back: Normal range of motion. No rigidity.      Right lower leg: No edema.      Left lower leg: No edema.   Skin:     General: Skin is warm and dry.   Neurological:      Mental Status: She is alert and oriented to person, place, and time. Mental status is at baseline.   Psychiatric:         Mood and Affect: Mood normal.         Behavior: Behavior normal.           LABS:  Recent Labs   Lab 11/18/22  0528 11/19/22  1154 11/20/22  0515    142 141   K 3.6 4.0 3.3*    105 104   CO2 26 25 27   BUN 45* 40* 41*   CREATININE 1.9* 1.9* 1.9*   * 137* 130*   ANIONGAP 9 12 10     Recent Labs   Lab 11/20/22  0515   MG 1.7   PHOS 3.1     Recent Labs   Lab 11/16/22  0351 11/17/22  0410 11/18/22  0528 11/19/22  1154 11/20/22  0515   AST 18 13 12  --   --    ALT 34 24 19  --   --    ALKPHOS 49* 44* 43*  --   --    BILITOT 2.0* 1.8* 1.6*  --   --    ALBUMIN 3.4* 3.1* 3.1*   < > 3.4*    < > = values in this interval not displayed.      Recent Labs   Lab 11/16/22  0351 11/17/22  0410 11/18/22  0528   WBC 7.83 7.57 7.45   HGB 7.8* 7.8* 7.9*   HCT 26.5* 26.1* 27.2*    196 178   GRAN 68.5  5.4 68.0  5.2 70.3  5.2     Recent Labs   Lab 11/16/22  0022   INR 1.1     Recent Labs   Lab 11/15/22  1700 11/16/22  0351 11/17/22  0055   TROPONINI 0.769* 0.780* 0.710*     Lab Results   Component Value Date    BNP 4,104 (H) 11/15/2022     (H) 11/02/2022    BNP 2,771 (H)  10/28/2022            IMAGING:  EKGs:  Results for orders placed or performed during the hospital encounter of 11/15/22   EKG 12-lead    Collection Time: 11/16/22  9:23 PM    Narrative    Test Reason : R07.9,    Vent. Rate : 077 BPM     Atrial Rate : 077 BPM     P-R Int : 202 ms          QRS Dur : 116 ms      QT Int : 428 ms       P-R-T Axes : -17 -36 091 degrees     QTc Int : 484 ms    Normal sinus rhythm  Left axis deviation  LVH with QRS widening and repolarization abnormality ( R in aVL , [employer]  product )  Abnormal ECG  When compared with ECG of 16-NOV-2022 07:58,  Premature ventricular complexes are no longer Present  Premature atrial complexes are no longer Present  Confirmed by Parviz Dewey MD ([id number]) on 11/17/2022 11:25:33 AM    Referred By: JIM   SELF           Confirmed By:Parviz Dewey MD           TTE:  EF   Date Value Ref Range Status   11/17/2022 10 % Final   11/30/2021 25 % Final   09/17/2021 25 % Final         Significant Imaging: I have reviewed all pertinent imaging results/findings within the past 24 hours.      INPATIENT MEDICATIONS:    Scheduled Meds:   albuterol-ipratropium  3 mL Nebulization BID    allopurinoL  100 mg Oral Daily    amiodarone  200 mg Oral Daily    apixaban  5 mg Oral BID    atorvastatin  40 mg Oral QHS    dextromethorphan-guaiFENesin  mg  2 tablet Oral BID    ferrous sulfate  1 tablet Oral Daily    fluticasone furoate-vilanteroL  1 puff Inhalation Daily    fluticasone propionate  2 spray Each Nostril Daily    furosemide (LASIX) injection  80 mg Intravenous TID    hydrALAZINE  50 mg Oral TID    isosorbide dinitrate  20 mg Oral TID    metoprolol succinate  100 mg Oral Daily    montelukast  10 mg Oral Daily    pantoprazole  40 mg Oral Daily    polyethylene glycol  17 g Oral Daily    sacubitriL-valsartan  1 tablet Oral BID    tiotropium bromide  2 puff Inhalation Daily     PRN Meds:ALPRAZolam, dextrose 10%, dextrose 10%, glucagon (human recombinant), glucose,  glucose, hydrocodone-apap 7.5-325 MG/15 ML, insulin aspart U-100, melatonin, naloxone, nitroGLYCERIN, prochlorperazine, sodium chloride 0.9%, sodium chloride 0.9%

## 2022-11-20 NOTE — ASSESSMENT & PLAN NOTE
Patient is identified as having Combined Systolic and Diastolic heart failure that is Acute on Chronic. CHF is currently uncontrolled due to volume overload due to: Continued edema of extremities and Weight gain    TTE[11/17/22]  Summary   The left ventricle is severely enlarged with eccentric hypertrophy and severely decreased systolic function. The estimated ejection fraction is 10%.   The right ventricle is not well visualized but appears normal in size with moderately reduced systolic function.   Grade II left ventricular diastolic dysfunction.   Biatrial enlargement.   The estimated PA systolic pressure is 65 mmHg.   Elevated central venous pressure (15 mmHg).   Small posterolateral pericardial effusion.      Home Medications: empagliflozin 25mg, furosemide 40mg qd, sacubitriL-valsartan   mg per tablet BID, metoprolol XL 100mg    Recent Labs     11/18/22  0528 11/19/22  1154 11/20/22  0515   BUN 45* 40* 41*   CREATININE 1.9* 1.9* 1.9*   CO2 26 25 27       RECOMMENDATIONS  -Continue Furosemide 80mg IV TID. Daily CMP, strict I/Os.  -GDMT/HTN: Continue Hydralazine-Isosorbide Dinitrate TID. Increase Hydralazine to 75mg and increase Isosorbide to 40mg. Uptitrate as tolerated  -Cardiac diet with Fluid restriction at 1.5L with strict I/Os and daily weights   - Maintain on telemetry. Check Electrolytes, keep Mag >2 & K+ >4  -Preliminary Discharge med recs:   -Continue sacubitriL-valsartan   mg  BID.   -Continue metoprolol XL 100mg QD  -Change Jardiance from 25mg to 10mg qd due to renal function.  -Start Hydralazine-Isosorbide TID  -Diuretic dosing TBD.   -Spironolactone not recommended due to low eGFR/CKD  - Heart failure/HTS clinic follow up for HF management and HTS evaluation.

## 2022-11-21 LAB
ALBUMIN SERPL BCP-MCNC: 3.4 G/DL (ref 3.5–5.2)
ANION GAP SERPL CALC-SCNC: 10 MMOL/L (ref 8–16)
BUN SERPL-MCNC: 48 MG/DL (ref 6–20)
CALCIUM SERPL-MCNC: 9.2 MG/DL (ref 8.7–10.5)
CHLORIDE SERPL-SCNC: 104 MMOL/L (ref 95–110)
CO2 SERPL-SCNC: 28 MMOL/L (ref 23–29)
CREAT SERPL-MCNC: 2 MG/DL (ref 0.5–1.4)
EST. GFR  (NO RACE VARIABLE): 28.6 ML/MIN/1.73 M^2
GLUCOSE SERPL-MCNC: 132 MG/DL (ref 70–110)
MAGNESIUM SERPL-MCNC: 2.1 MG/DL (ref 1.6–2.6)
PHOSPHATE SERPL-MCNC: 3.8 MG/DL (ref 2.7–4.5)
POCT GLUCOSE: 152 MG/DL (ref 70–110)
POCT GLUCOSE: 168 MG/DL (ref 70–110)
POCT GLUCOSE: 173 MG/DL (ref 70–110)
POCT GLUCOSE: 179 MG/DL (ref 70–110)
POTASSIUM SERPL-SCNC: 3.6 MMOL/L (ref 3.5–5.1)
SODIUM SERPL-SCNC: 142 MMOL/L (ref 136–145)

## 2022-11-21 PROCEDURE — 36415 COLL VENOUS BLD VENIPUNCTURE: CPT | Performed by: INTERNAL MEDICINE

## 2022-11-21 PROCEDURE — 80069 RENAL FUNCTION PANEL: CPT | Performed by: INTERNAL MEDICINE

## 2022-11-21 PROCEDURE — 94640 AIRWAY INHALATION TREATMENT: CPT

## 2022-11-21 PROCEDURE — 25000242 PHARM REV CODE 250 ALT 637 W/ HCPCS: Performed by: STUDENT IN AN ORGANIZED HEALTH CARE EDUCATION/TRAINING PROGRAM

## 2022-11-21 PROCEDURE — 99232 SBSQ HOSP IP/OBS MODERATE 35: CPT | Mod: ,,, | Performed by: INTERNAL MEDICINE

## 2022-11-21 PROCEDURE — 25000003 PHARM REV CODE 250: Performed by: STUDENT IN AN ORGANIZED HEALTH CARE EDUCATION/TRAINING PROGRAM

## 2022-11-21 PROCEDURE — 99900035 HC TECH TIME PER 15 MIN (STAT)

## 2022-11-21 PROCEDURE — 11000001 HC ACUTE MED/SURG PRIVATE ROOM

## 2022-11-21 PROCEDURE — 25000003 PHARM REV CODE 250: Performed by: INTERNAL MEDICINE

## 2022-11-21 PROCEDURE — 63600175 PHARM REV CODE 636 W HCPCS: Performed by: INTERNAL MEDICINE

## 2022-11-21 PROCEDURE — 99233 SBSQ HOSP IP/OBS HIGH 50: CPT | Mod: ,,, | Performed by: INTERNAL MEDICINE

## 2022-11-21 PROCEDURE — 99232 PR SUBSEQUENT HOSPITAL CARE,LEVL II: ICD-10-PCS | Mod: ,,, | Performed by: INTERNAL MEDICINE

## 2022-11-21 PROCEDURE — 99233 PR SUBSEQUENT HOSPITAL CARE,LEVL III: ICD-10-PCS | Mod: ,,, | Performed by: INTERNAL MEDICINE

## 2022-11-21 PROCEDURE — 83735 ASSAY OF MAGNESIUM: CPT | Performed by: INTERNAL MEDICINE

## 2022-11-21 PROCEDURE — 94761 N-INVAS EAR/PLS OXIMETRY MLT: CPT

## 2022-11-21 RX ORDER — FUROSEMIDE 80 MG/1
80 TABLET ORAL 2 TIMES DAILY
Status: DISCONTINUED | OUTPATIENT
Start: 2022-11-22 | End: 2022-11-22

## 2022-11-21 RX ORDER — GUAIFENESIN 100 MG/5ML
200 SOLUTION ORAL EVERY 4 HOURS PRN
Status: DISCONTINUED | OUTPATIENT
Start: 2022-11-21 | End: 2022-11-22 | Stop reason: HOSPADM

## 2022-11-21 RX ORDER — FLUCONAZOLE 150 MG/1
150 TABLET ORAL ONCE
Status: COMPLETED | OUTPATIENT
Start: 2022-11-22 | End: 2022-11-22

## 2022-11-21 RX ORDER — FUROSEMIDE 80 MG/1
80 TABLET ORAL 3 TIMES DAILY
Status: DISCONTINUED | OUTPATIENT
Start: 2022-11-21 | End: 2022-11-21

## 2022-11-21 RX ADMIN — APIXABAN 5 MG: 5 TABLET, FILM COATED ORAL at 08:11

## 2022-11-21 RX ADMIN — GUAIFENESIN AND DEXTROMETHORPHAN HYDROBROMIDE 2 TABLET: 600; 30 TABLET, EXTENDED RELEASE ORAL at 08:11

## 2022-11-21 RX ADMIN — SACUBITRIL AND VALSARTAN 1 TABLET: 97; 103 TABLET, FILM COATED ORAL at 08:11

## 2022-11-21 RX ADMIN — ISOSORBIDE DINITRATE 40 MG: 20 TABLET ORAL at 08:11

## 2022-11-21 RX ADMIN — FLUTICASONE PROPIONATE 100 MCG: 50 SPRAY, METERED NASAL at 08:11

## 2022-11-21 RX ADMIN — POTASSIUM CHLORIDE 30 MEQ: 10 CAPSULE, COATED, EXTENDED RELEASE ORAL at 08:11

## 2022-11-21 RX ADMIN — HYDRALAZINE HYDROCHLORIDE 75 MG: 50 TABLET ORAL at 08:11

## 2022-11-21 RX ADMIN — MONTELUKAST 10 MG: 10 TABLET, FILM COATED ORAL at 08:11

## 2022-11-21 RX ADMIN — ATORVASTATIN CALCIUM 40 MG: 40 TABLET, FILM COATED ORAL at 08:11

## 2022-11-21 RX ADMIN — IPRATROPIUM BROMIDE AND ALBUTEROL SULFATE 3 ML: 2.5; .5 SOLUTION RESPIRATORY (INHALATION) at 08:11

## 2022-11-21 RX ADMIN — ALLOPURINOL 100 MG: 100 TABLET ORAL at 08:11

## 2022-11-21 RX ADMIN — FERROUS SULFATE TAB 325 MG (65 MG ELEMENTAL FE) 1 EACH: 325 (65 FE) TAB at 08:11

## 2022-11-21 RX ADMIN — FUROSEMIDE 80 MG: 80 TABLET ORAL at 03:11

## 2022-11-21 RX ADMIN — HYDRALAZINE HYDROCHLORIDE 75 MG: 50 TABLET ORAL at 03:11

## 2022-11-21 RX ADMIN — PANTOPRAZOLE SODIUM 40 MG: 40 TABLET, DELAYED RELEASE ORAL at 08:11

## 2022-11-21 RX ADMIN — TIOTROPIUM BROMIDE INHALATION SPRAY 2 PUFF: 3.12 SPRAY, METERED RESPIRATORY (INHALATION) at 08:11

## 2022-11-21 RX ADMIN — AMIODARONE HYDROCHLORIDE 200 MG: 200 TABLET ORAL at 08:11

## 2022-11-21 RX ADMIN — ISOSORBIDE DINITRATE 40 MG: 20 TABLET ORAL at 03:11

## 2022-11-21 RX ADMIN — METOPROLOL SUCCINATE 100 MG: 100 TABLET, EXTENDED RELEASE ORAL at 08:11

## 2022-11-21 RX ADMIN — FLUTICASONE FUROATE AND VILANTEROL TRIFENATATE 1 PUFF: 200; 25 POWDER RESPIRATORY (INHALATION) at 08:11

## 2022-11-21 RX ADMIN — FUROSEMIDE 80 MG: 80 TABLET ORAL at 08:11

## 2022-11-21 RX ADMIN — FUROSEMIDE 80 MG: 10 INJECTION, SOLUTION INTRAVENOUS at 08:11

## 2022-11-21 RX ADMIN — IPRATROPIUM BROMIDE AND ALBUTEROL SULFATE 3 ML: 2.5; .5 SOLUTION RESPIRATORY (INHALATION) at 09:11

## 2022-11-21 NOTE — ASSESSMENT & PLAN NOTE
Patient is identified as having Combined Systolic and Diastolic heart failure that is Acute on Chronic. CHF is currently uncontrolled due to volume overload due to: Continued edema of extremities and Weight gain    TTE[11/17/22]  Summary   The left ventricle is severely enlarged with eccentric hypertrophy and severely decreased systolic function. The estimated ejection fraction is 10%.   The right ventricle is not well visualized but appears normal in size with moderately reduced systolic function.   Grade II left ventricular diastolic dysfunction.   Biatrial enlargement.   The estimated PA systolic pressure is 65 mmHg.   Elevated central venous pressure (15 mmHg).   Small posterolateral pericardial effusion.      Home Medications: empagliflozin 25mg, furosemide 40mg qd, sacubitriL-valsartan   mg per tablet BID, metoprolol XL 100mg    Recent Labs     11/19/22  1154 11/20/22  0515 11/21/22  0427   BUN 40* 41* 48*   CREATININE 1.9* 1.9* 2.0*   CO2 25 27 28       RECOMMENDATIONS  -Transition Furosemide to 80mg PO BID. Daily CMP, strict I/Os.  -GDMT/HTN: Continue Hydralazine-Isosorbide 75mg/40mg Dinitrate TID.Uptitrate as tolerated  -Cardiac diet with Fluid restriction at 1.5L with strict I/Os and daily weights   - Maintain on telemetry. Check Electrolytes, keep Mag >2 & K+ >4  -Preliminary Discharge med recs:   -Continue sacubitriL-valsartan   mg  BID.   -Continue metoprolol XL 100mg QD  -Change Jardiance from 25mg to 10mg qd due to renal function.  -Start Hydralazine-Isosorbide TID  -Furosemide 80mg BID  -Spironolactone not recommended due to low eGFR/CKD  - Heart failure/HTS clinic follow up for HF management and HTS evaluation.

## 2022-11-21 NOTE — PROGRESS NOTES
[facility] [place] - Med Surg  Cardiology  Progress Note    Patient Name: Hafsa Hawley  MRN: [id number]  Admission Date: 11/15/2022  Hospital Length of Stay: 6 days  Code Status: Full Code   Attending Physician: Karyn Parrish MD   Primary Care Physician: Cristobal Ann MD  Expected Discharge Date: 11/22/2022  Principal Problem:Acute on chronic combined systolic and diastolic heart failure    Subjective:     Hospital Course:   No notes on file    Interval History: No acute events overnight, afebrile, hemodynamically stable. Patient with adequate UO on IV lasix.    Review of Systems   Constitutional: Negative for chills, fever and malaise/fatigue.   Eyes:  Negative for vision loss in left eye and vision loss in right eye.   Cardiovascular:  Negative for chest pain (Improving, decreased frequency), dyspnea on exertion (improving), leg swelling and palpitations.   Respiratory:  Negative for cough (Improving) and shortness of breath (improving).    Musculoskeletal:  Negative for falls and muscle cramps.   Gastrointestinal:  Negative for constipation, diarrhea, nausea and vomiting.   Genitourinary:  Positive for frequency (on diuresis). Negative for incomplete emptying.   Neurological:  Negative for dizziness and light-headedness.   Objective:     Vital Signs (Most Recent):  Temp: 97.4 °F (36.3 °C) (11/21/22 1137)  Pulse: 70 (11/21/22 1137)  Resp: 18 (11/21/22 1137)  BP: 109/65 (11/21/22 1137)  SpO2: (!) 93 % (11/21/22 1137)   Vital Signs (24h Range):  Temp:  [97.4 °F (36.3 °C)-98.1 °F (36.7 °C)] 97.4 °F (36.3 °C)  Pulse:  [59-75] 70  Resp:  [16-18] 18  SpO2:  [93 %-95 %] 93 %  BP: (100-123)/(55-76) 109/65     Weight: 100.2 kg (220 lb 14.4 oz)  Body mass index is 35.65 kg/m².     SpO2: (!) 93 %  O2 Device (Oxygen Therapy): room air      Intake/Output Summary (Last 24 hours) at 11/21/2022 1508  Last data filed at 11/21/2022 1100  Gross per 24 hour   Intake 840 ml   Output 750 ml   Net 90 ml         Lines/Drains/Airways        Peripheral Intravenous Line  Duration                  Peripheral IV - Single Lumen 11/15/22 1659 20 G Right Antecubital 5 days                    Physical Exam  Vitals and nursing note reviewed.   Constitutional:       General: She is not in acute distress.     Appearance: Normal appearance. She is obese. She is not ill-appearing, toxic-appearing or diaphoretic.      Interventions: Nasal cannula in place.   HENT:      Mouth/Throat:      Mouth: Mucous membranes are moist.   Cardiovascular:      Rate and Rhythm: Normal rate and regular rhythm.      Heart sounds: Normal heart sounds.   Pulmonary:      Effort: Pulmonary effort is normal. No respiratory distress.      Breath sounds: Normal breath sounds.   Abdominal:      General: Bowel sounds are normal.      Palpations: Abdomen is soft.      Tenderness: There is no abdominal tenderness.   Musculoskeletal:         General: No swelling (improving) or tenderness.      Cervical back: Normal range of motion. No rigidity.      Right lower leg: No edema.      Left lower leg: No edema.   Skin:     General: Skin is warm and dry.   Neurological:      Mental Status: She is alert and oriented to person, place, and time. Mental status is at baseline.   Psychiatric:         Mood and Affect: Mood normal.         Behavior: Behavior normal.           LABS:  Recent Labs   Lab 11/19/22  1154 11/20/22  0515 11/21/22  0427    141 142   K 4.0 3.3* 3.6    104 104   CO2 25 27 28   BUN 40* 41* 48*   CREATININE 1.9* 1.9* 2.0*   * 130* 132*   ANIONGAP 12 10 10       Recent Labs   Lab 11/21/22  0427   MG 2.1   PHOS 3.8       Recent Labs   Lab 11/16/22  0351 11/17/22  0410 11/18/22  0528 11/19/22  1154 11/21/22  0427   AST 18 13 12  --   --    ALT 34 24 19  --   --    ALKPHOS 49* 44* 43*  --   --    BILITOT 2.0* 1.8* 1.6*  --   --    ALBUMIN 3.4* 3.1* 3.1*   < > 3.4*    < > = values in this interval not displayed.        Recent Labs   Lab 11/16/22  0351 11/17/22 0410  11/18/22  0528   WBC 7.83 7.57 7.45   HGB 7.8* 7.8* 7.9*   HCT 26.5* 26.1* 27.2*    196 178   GRAN 68.5  5.4 68.0  5.2 70.3  5.2       Recent Labs   Lab 11/16/22  0022   INR 1.1       Recent Labs   Lab 11/15/22  1700 11/16/22  0351 11/17/22  0055   TROPONINI 0.769* 0.780* 0.710*       Lab Results   Component Value Date    BNP 4,104 (H) 11/15/2022     (H) 11/02/2022    BNP 2,771 (H) 10/28/2022            IMAGING:  EKGs:  Results for orders placed or performed during the hospital encounter of 11/15/22   EKG 12-lead    Collection Time: 11/16/22  9:23 PM    Narrative    Test Reason : R07.9,    Vent. Rate : 077 BPM     Atrial Rate : 077 BPM     P-R Int : 202 ms          QRS Dur : 116 ms      QT Int : 428 ms       P-R-T Axes : -17 -36 091 degrees     QTc Int : 484 ms    Normal sinus rhythm  Left axis deviation  LVH with QRS widening and repolarization abnormality ( R in aVL , [employer]  product )  Abnormal ECG  When compared with ECG of 16-NOV-2022 07:58,  Premature ventricular complexes are no longer Present  Premature atrial complexes are no longer Present  Confirmed by Parviz Dewey MD ([id number]) on 11/17/2022 11:25:33 AM    Referred By: [email or web address]   SELF           Confirmed By:Parviz Dewey MD           TTE:  EF   Date Value Ref Range Status   11/17/2022 10 % Final   11/30/2021 25 % Final   09/17/2021 25 % Final         Significant Imaging: I have reviewed all pertinent imaging results/findings within the past 24 hours.      INPATIENT MEDICATIONS:    Scheduled Meds:   albuterol-ipratropium  3 mL Nebulization BID    allopurinoL  100 mg Oral Daily    amiodarone  200 mg Oral Daily    apixaban  5 mg Oral BID    atorvastatin  40 mg Oral QHS    dextromethorphan-guaiFENesin  mg  2 tablet Oral BID    ferrous sulfate  1 tablet Oral Daily    fluticasone furoate-vilanteroL  1 puff Inhalation Daily    fluticasone propionate  2 spray Each Nostril Daily    furosemide  80 mg Oral TID    hydrALAZINE  75  mg Oral TID    isosorbide dinitrate  40 mg Oral TID    metoprolol succinate  100 mg Oral Daily    montelukast  10 mg Oral Daily    pantoprazole  40 mg Oral Daily    polyethylene glycol  17 g Oral Daily    potassium chloride  30 mEq Oral with breakfast    sacubitriL-valsartan  1 tablet Oral BID    tiotropium bromide  2 puff Inhalation Daily     PRN Meds:ALPRAZolam, dextrose 10%, dextrose 10%, glucagon (human recombinant), glucose, glucose, hydrocodone-apap 7.5-325 MG/15 ML, insulin aspart U-100, melatonin, naloxone, nitroGLYCERIN, prochlorperazine, sodium chloride 0.9%, sodium chloride 0.9%        Assessment and Plan:       * Acute on chronic combined systolic and diastolic heart failure  Patient is identified as having Combined Systolic and Diastolic heart failure that is Acute on Chronic. CHF is currently uncontrolled due to volume overload due to: Continued edema of extremities and Weight gain    TTE[11/17/22]  Summary   The left ventricle is severely enlarged with eccentric hypertrophy and severely decreased systolic function. The estimated ejection fraction is 10%.   The right ventricle is not well visualized but appears normal in size with moderately reduced systolic function.   Grade II left ventricular diastolic dysfunction.   Biatrial enlargement.   The estimated PA systolic pressure is 65 mmHg.   Elevated central venous pressure (15 mmHg).   Small posterolateral pericardial effusion.      Home Medications: empagliflozin 25mg, furosemide 40mg qd, sacubitriL-valsartan   mg per tablet BID, metoprolol XL 100mg    Recent Labs     11/19/22  1154 11/20/22  0515 11/21/22  0427   BUN 40* 41* 48*   CREATININE 1.9* 1.9* 2.0*   CO2 25 27 28       RECOMMENDATIONS  -Transition Furosemide to 80mg PO BID. Daily CMP, strict I/Os.  -GDMT/HTN: Continue Hydralazine-Isosorbide 75mg/40mg Dinitrate TID.Uptitrate as tolerated  -Cardiac diet with Fluid restriction at 1.5L with strict I/Os and daily weights   -  Maintain on telemetry. Check Electrolytes, keep Mag >2 & K+ >4  -Preliminary Discharge med recs:   -Continue sacubitriL-valsartan   mg  BID.   -Continue metoprolol XL 100mg QD  -Change Jardiance from 25mg to 10mg qd due to renal function.  -Start Hydralazine-Isosorbide TID  -Furosemide 80mg BID  -Spironolactone not recommended due to low eGFR/CKD  - Heart failure/HTS clinic follow up for HF management and HTS evaluation.     Paroxysmal atrial fibrillation  Normal sinus rhythm on EKG    [id number] Score: 4    RECOMMENDATIONS  -Continue amiodarone  -Continue Toprol  -Anticoagulation: On apixaban 2.5mg BID at home(unclear why?). Does not meet criteria for dose adjustment, change to apixaban 5mg BID      Elevated troponin  Troponin remains flat at around 0.7-0.8. Likely secondary to CKD, chronic dilated cardiomyopathy, and demand ischemia secondary to HF.        VTE Risk Mitigation (From admission, onward)         Ordered     apixaban tablet 5 mg  2 times daily         11/15/22 2229     Reason for No Pharmacological VTE Prophylaxis  Once        Question:  Reasons:  Answer:  Physician Provided (leave comment)  Comment:  apixaban    11/15/22 2227     IP VTE HIGH RISK PATIENT  Once         11/15/22 2227     Place sequential compression device  Until discontinued         11/15/22 2111                Asif Henderson MD  Cardiology  [facility] - Med Surg

## 2022-11-21 NOTE — PLAN OF CARE
[facility] - Med S[facility]  Initial Discharge Assessment       Primary Care Provider: Cristobal Ann MD    Admission Diagnosis: Shortness of breath [R06.02]  Chest pain [R07.9]  Acute on chronic congestive heart failure, unspecified heart failure type [I50.9]    Admission Date: 11/15/2022  Expected Discharge Date: 11/22/2022    Discharge Barriers Identified: None    Payor: MEDICAID / Plan: [employer] [facility] / Product Type: Managed Medicaid /     Extended Emergency Contact Information  Primary Emergency Contact: BautistaJaye  Address: [address] [place] [address] [facility]  Home Phone: [phone]  Relation: Sister  Secondary Emergency Contact: Jeanie Jaimes  Mobile Phone: [phone]  Relation: Sister    Discharge Plan A: Home with family  Discharge Plan B: Home      [id number] [place] - [address]  [address]  [place] [address]  Phone: [phone] Fax: [phone]    [facility] [address] [place] [address]  Phone: [phone] Fax: [phone]      Initial Assessment (most recent)       Adult Discharge Assessment - 11/21/22 1143          Discharge Assessment    Assessment Type Discharge Planning Assessment     Confirmed/corrected address, phone number and insurance Yes     Source of Information patient     Communicated CHIQUI with patient/caregiver Yes     Reason For Admission Acute on chronic combined systolic & diastolic heart failure     Lives With other (see comments)   Pt lives w/her ex- & their Granddaughter    Facility Arrived From: Home     Do you expect to return to your current living situation? Yes     Do you have help at home or someone to help you manage your care at home? Yes     Who are your caregiver(s) and their phone number(s)? Jaye Baum/Sister [phone]     Prior to hospitilization cognitive status: Alert/Oriented;No Deficits     Current cognitive status: Alert/Oriented;No Deficits     Dressing/Bathing  Difficulty none     Home Accessibility stairs to enter home     Number of Stairs, Main Entrance five     Stair Railings, Main Entrance railings safe and in good condition     Landing, Stairs, Main Entrance adequate turning radius     Home Layout Able to live on 1st floor     Equipment Currently Used at Home oxygen;walker, standard;shower chair;CPAP;nebulizer     Readmission within 30 days? No     Patient currently being followed by outpatient case management? No     Do you currently have service(s) that help you manage your care at home? No     Do you take prescription medications? Yes     Do you have prescription coverage? Yes     Coverage Medicaid     Do you have any problems affording any of your prescribed medications? No     Is the patient taking medications as prescribed? yes     Who is going to help you get home at discharge? Jayekira Baum/Sister [phone] & ex-     How do you get to doctors appointments? family or friend will provide     Are you on dialysis? No     Do you take coumadin? No     Discharge Plan A Home with family     Discharge Plan B Home     DME Needed Upon Discharge  none     Discharge Plan discussed with: Patient     Discharge Barriers Identified None                   This CM  met with patient  in room 651 to complete DPA. Questions answered / contact numbers provided.  [facility]. Patient  is independent with all ADLs - does not use DME & is stable without O2 @ this time. Pt has Home O2 to include a concentrator & portable, Home CPAP, nebulizer, walker, & shower chair In-home equipment, is not on HD, BTS. Pt's ex- & her Sister/Jaye will be assisting with help as necessary upon discharge. Sister/Jaye will be providing transportation home. Hospital follow up will be scheduled with PCP. Will continue to follow for course of hospitalization.     Mary Baird RN CM  Case Management  [phone]

## 2022-11-21 NOTE — SUBJECTIVE & OBJECTIVE
Interval History: No acute events overnight, afebrile, hemodynamically stable. Patient with adequate UO on IV lasix.    Review of Systems   Constitutional: Negative for chills, fever and malaise/fatigue.   Eyes:  Negative for vision loss in left eye and vision loss in right eye.   Cardiovascular:  Negative for chest pain (Improving, decreased frequency), dyspnea on exertion (improving), leg swelling and palpitations.   Respiratory:  Negative for cough (Improving) and shortness of breath (improving).    Musculoskeletal:  Negative for falls and muscle cramps.   Gastrointestinal:  Negative for constipation, diarrhea, nausea and vomiting.   Genitourinary:  Positive for frequency (on diuresis). Negative for incomplete emptying.   Neurological:  Negative for dizziness and light-headedness.   Objective:     Vital Signs (Most Recent):  Temp: 97.4 °F (36.3 °C) (11/21/22 1137)  Pulse: 70 (11/21/22 1137)  Resp: 18 (11/21/22 1137)  BP: 109/65 (11/21/22 1137)  SpO2: (!) 93 % (11/21/22 1137)   Vital Signs (24h Range):  Temp:  [97.4 °F (36.3 °C)-98.1 °F (36.7 °C)] 97.4 °F (36.3 °C)  Pulse:  [59-75] 70  Resp:  [16-18] 18  SpO2:  [93 %-95 %] 93 %  BP: (100-123)/(55-76) 109/65     Weight: 100.2 kg (220 lb 14.4 oz)  Body mass index is 35.65 kg/m².     SpO2: (!) 93 %  O2 Device (Oxygen Therapy): room air      Intake/Output Summary (Last 24 hours) at 11/21/2022 1508  Last data filed at 11/21/2022 1100  Gross per 24 hour   Intake 840 ml   Output 750 ml   Net 90 ml         Lines/Drains/Airways       Peripheral Intravenous Line  Duration                  Peripheral IV - Single Lumen 11/15/22 1659 20 G Right Antecubital 5 days                    Physical Exam  Vitals and nursing note reviewed.   Constitutional:       General: She is not in acute distress.     Appearance: Normal appearance. She is obese. She is not ill-appearing, toxic-appearing or diaphoretic.      Interventions: Nasal cannula in place.   HENT:      Mouth/Throat:       Mouth: Mucous membranes are moist.   Cardiovascular:      Rate and Rhythm: Normal rate and regular rhythm.      Heart sounds: Normal heart sounds.   Pulmonary:      Effort: Pulmonary effort is normal. No respiratory distress.      Breath sounds: Normal breath sounds.   Abdominal:      General: Bowel sounds are normal.      Palpations: Abdomen is soft.      Tenderness: There is no abdominal tenderness.   Musculoskeletal:         General: No swelling (improving) or tenderness.      Cervical back: Normal range of motion. No rigidity.      Right lower leg: No edema.      Left lower leg: No edema.   Skin:     General: Skin is warm and dry.   Neurological:      Mental Status: She is alert and oriented to person, place, and time. Mental status is at baseline.   Psychiatric:         Mood and Affect: Mood normal.         Behavior: Behavior normal.           LABS:  Recent Labs   Lab 11/19/22  1154 11/20/22  0515 11/21/22  0427    141 142   K 4.0 3.3* 3.6    104 104   CO2 25 27 28   BUN 40* 41* 48*   CREATININE 1.9* 1.9* 2.0*   * 130* 132*   ANIONGAP 12 10 10       Recent Labs   Lab 11/21/22  0427   MG 2.1   PHOS 3.8       Recent Labs   Lab 11/16/22  0351 11/17/22  0410 11/18/22  0528 11/19/22  1154 11/21/22  0427   AST 18 13 12  --   --    ALT 34 24 19  --   --    ALKPHOS 49* 44* 43*  --   --    BILITOT 2.0* 1.8* 1.6*  --   --    ALBUMIN 3.4* 3.1* 3.1*   < > 3.4*    < > = values in this interval not displayed.        Recent Labs   Lab 11/16/22  0351 11/17/22  0410 11/18/22  0528   WBC 7.83 7.57 7.45   HGB 7.8* 7.8* 7.9*   HCT 26.5* 26.1* 27.2*    196 178   GRAN 68.5  5.4 68.0  5.2 70.3  5.2       Recent Labs   Lab 11/16/22  0022   INR 1.1       Recent Labs   Lab 11/15/22  1700 11/16/22  0351 11/17/22  0055   TROPONINI 0.769* 0.780* 0.710*       Lab Results   Component Value Date    BNP 4,104 (H) 11/15/2022     (H) 11/02/2022    BNP 2,771 (H) 10/28/2022            IMAGING:  EKGs:  Results  for orders placed or performed during the hospital encounter of 11/15/22   EKG 12-lead    Collection Time: 11/16/22  9:23 PM    Narrative    Test Reason : R07.9,    Vent. Rate : 077 BPM     Atrial Rate : 077 BPM     P-R Int : 202 ms          QRS Dur : 116 ms      QT Int : 428 ms       P-R-T Axes : -17 -36 091 degrees     QTc Int : 484 ms    Normal sinus rhythm  Left axis deviation  LVH with QRS widening and repolarization abnormality ( R in aVL , [place]  product )  Abnormal ECG  When compared with ECG of 16-NOV-2022 07:58,  Premature ventricular complexes are no longer Present  Premature atrial complexes are no longer Present  Confirmed by Parviz Dewey MD ([id number]) on 11/17/2022 11:25:33 AM    Referred By: JIM   SELF           Confirmed By:Parviz Dewey MD           TTE:  EF   Date Value Ref Range Status   11/17/2022 10 % Final   11/30/2021 25 % Final   09/17/2021 25 % Final         Significant Imaging: I have reviewed all pertinent imaging results/findings within the past 24 hours.      INPATIENT MEDICATIONS:    Scheduled Meds:   albuterol-ipratropium  3 mL Nebulization BID    allopurinoL  100 mg Oral Daily    amiodarone  200 mg Oral Daily    apixaban  5 mg Oral BID    atorvastatin  40 mg Oral QHS    dextromethorphan-guaiFENesin  mg  2 tablet Oral BID    ferrous sulfate  1 tablet Oral Daily    fluticasone furoate-vilanteroL  1 puff Inhalation Daily    fluticasone propionate  2 spray Each Nostril Daily    furosemide  80 mg Oral TID    hydrALAZINE  75 mg Oral TID    isosorbide dinitrate  40 mg Oral TID    metoprolol succinate  100 mg Oral Daily    montelukast  10 mg Oral Daily    pantoprazole  40 mg Oral Daily    polyethylene glycol  17 g Oral Daily    potassium chloride  30 mEq Oral with breakfast    sacubitriL-valsartan  1 tablet Oral BID    tiotropium bromide  2 puff Inhalation Daily     PRN Meds:ALPRAZolam, dextrose 10%, dextrose 10%, glucagon (human recombinant), glucose, glucose, hydrocodone-apap  7.5-325 MG/15 ML, insulin aspart U-100, melatonin, naloxone, nitroGLYCERIN, prochlorperazine, sodium chloride 0.9%, sodium chloride 0.9%

## 2022-11-22 VITALS
HEART RATE: 80 BPM | OXYGEN SATURATION: 92 % | WEIGHT: 220.88 LBS | TEMPERATURE: 98 F | DIASTOLIC BLOOD PRESSURE: 71 MMHG | SYSTOLIC BLOOD PRESSURE: 124 MMHG | HEIGHT: 66 IN | RESPIRATION RATE: 18 BRPM | BODY MASS INDEX: 35.5 KG/M2

## 2022-11-22 LAB
ALBUMIN SERPL BCP-MCNC: 3.4 G/DL (ref 3.5–5.2)
ANION GAP SERPL CALC-SCNC: 11 MMOL/L (ref 8–16)
BUN SERPL-MCNC: 48 MG/DL (ref 6–20)
CALCIUM SERPL-MCNC: 9.6 MG/DL (ref 8.7–10.5)
CHLORIDE SERPL-SCNC: 105 MMOL/L (ref 95–110)
CO2 SERPL-SCNC: 24 MMOL/L (ref 23–29)
CREAT SERPL-MCNC: 2.4 MG/DL (ref 0.5–1.4)
EST. GFR  (NO RACE VARIABLE): 23 ML/MIN/1.73 M^2
GLUCOSE SERPL-MCNC: 140 MG/DL (ref 70–110)
MAGNESIUM SERPL-MCNC: 2 MG/DL (ref 1.6–2.6)
PHOSPHATE SERPL-MCNC: 3.8 MG/DL (ref 2.7–4.5)
POCT GLUCOSE: 146 MG/DL (ref 70–110)
POCT GLUCOSE: 160 MG/DL (ref 70–110)
POTASSIUM SERPL-SCNC: 3.5 MMOL/L (ref 3.5–5.1)
SODIUM SERPL-SCNC: 140 MMOL/L (ref 136–145)

## 2022-11-22 PROCEDURE — 25000003 PHARM REV CODE 250: Performed by: STUDENT IN AN ORGANIZED HEALTH CARE EDUCATION/TRAINING PROGRAM

## 2022-11-22 PROCEDURE — 80069 RENAL FUNCTION PANEL: CPT | Performed by: INTERNAL MEDICINE

## 2022-11-22 PROCEDURE — 36415 COLL VENOUS BLD VENIPUNCTURE: CPT | Performed by: INTERNAL MEDICINE

## 2022-11-22 PROCEDURE — 99239 PR HOSPITAL DISCHARGE DAY,>30 MIN: ICD-10-PCS | Mod: ,,, | Performed by: INTERNAL MEDICINE

## 2022-11-22 PROCEDURE — 99900035 HC TECH TIME PER 15 MIN (STAT)

## 2022-11-22 PROCEDURE — 83735 ASSAY OF MAGNESIUM: CPT | Performed by: INTERNAL MEDICINE

## 2022-11-22 PROCEDURE — 94761 N-INVAS EAR/PLS OXIMETRY MLT: CPT

## 2022-11-22 PROCEDURE — 94640 AIRWAY INHALATION TREATMENT: CPT

## 2022-11-22 PROCEDURE — 99231 PR SUBSEQUENT HOSPITAL CARE,LEVL I: ICD-10-PCS | Mod: ,,, | Performed by: INTERNAL MEDICINE

## 2022-11-22 PROCEDURE — 25000242 PHARM REV CODE 250 ALT 637 W/ HCPCS: Performed by: STUDENT IN AN ORGANIZED HEALTH CARE EDUCATION/TRAINING PROGRAM

## 2022-11-22 PROCEDURE — 99239 HOSP IP/OBS DSCHRG MGMT >30: CPT | Mod: ,,, | Performed by: INTERNAL MEDICINE

## 2022-11-22 PROCEDURE — 25000003 PHARM REV CODE 250: Performed by: INTERNAL MEDICINE

## 2022-11-22 PROCEDURE — 99231 SBSQ HOSP IP/OBS SF/LOW 25: CPT | Mod: ,,, | Performed by: INTERNAL MEDICINE

## 2022-11-22 RX ORDER — ISOSORBIDE DINITRATE AND HYDRALAZINE HYDROCHLORIDE 37.5; 2 MG/1; MG/1
2 TABLET ORAL 3 TIMES DAILY
Qty: 180 TABLET | Refills: 11 | Status: SHIPPED | OUTPATIENT
Start: 2022-11-22 | End: 2022-12-16 | Stop reason: ALTCHOICE

## 2022-11-22 RX ORDER — FUROSEMIDE 80 MG/1
80 TABLET ORAL 2 TIMES DAILY
Qty: 60 TABLET | Refills: 0 | Status: ON HOLD
Start: 2022-11-22 | End: 2023-01-08 | Stop reason: HOSPADM

## 2022-11-22 RX ORDER — FUROSEMIDE 80 MG/1
80 TABLET ORAL 2 TIMES DAILY
Status: DISCONTINUED | OUTPATIENT
Start: 2022-11-23 | End: 2022-11-22 | Stop reason: HOSPADM

## 2022-11-22 RX ORDER — POTASSIUM CHLORIDE 750 MG/1
10 CAPSULE, EXTENDED RELEASE ORAL DAILY
Qty: 30 CAPSULE | Refills: 11 | Status: SHIPPED | OUTPATIENT
Start: 2022-11-22 | End: 2022-12-30 | Stop reason: SDUPTHER

## 2022-11-22 RX ADMIN — HYDRALAZINE HYDROCHLORIDE 75 MG: 50 TABLET ORAL at 09:11

## 2022-11-22 RX ADMIN — IPRATROPIUM BROMIDE AND ALBUTEROL SULFATE 3 ML: 2.5; .5 SOLUTION RESPIRATORY (INHALATION) at 08:11

## 2022-11-22 RX ADMIN — PANTOPRAZOLE SODIUM 40 MG: 40 TABLET, DELAYED RELEASE ORAL at 09:11

## 2022-11-22 RX ADMIN — MONTELUKAST 10 MG: 10 TABLET, FILM COATED ORAL at 09:11

## 2022-11-22 RX ADMIN — POLYETHYLENE GLYCOL 3350 17 G: 17 POWDER, FOR SOLUTION ORAL at 09:11

## 2022-11-22 RX ADMIN — METOPROLOL SUCCINATE 100 MG: 100 TABLET, EXTENDED RELEASE ORAL at 09:11

## 2022-11-22 RX ADMIN — GUAIFENESIN AND DEXTROMETHORPHAN HYDROBROMIDE 2 TABLET: 600; 30 TABLET, EXTENDED RELEASE ORAL at 09:11

## 2022-11-22 RX ADMIN — ALLOPURINOL 100 MG: 100 TABLET ORAL at 09:11

## 2022-11-22 RX ADMIN — AMIODARONE HYDROCHLORIDE 200 MG: 200 TABLET ORAL at 09:11

## 2022-11-22 RX ADMIN — FLUCONAZOLE 150 MG: 150 TABLET ORAL at 12:11

## 2022-11-22 RX ADMIN — FERROUS SULFATE TAB 325 MG (65 MG ELEMENTAL FE) 1 EACH: 325 (65 FE) TAB at 09:11

## 2022-11-22 RX ADMIN — APIXABAN 5 MG: 5 TABLET, FILM COATED ORAL at 09:11

## 2022-11-22 RX ADMIN — SACUBITRIL AND VALSARTAN 1 TABLET: 97; 103 TABLET, FILM COATED ORAL at 09:11

## 2022-11-22 RX ADMIN — TIOTROPIUM BROMIDE INHALATION SPRAY 2 PUFF: 3.12 SPRAY, METERED RESPIRATORY (INHALATION) at 08:11

## 2022-11-22 RX ADMIN — ISOSORBIDE DINITRATE 40 MG: 20 TABLET ORAL at 09:11

## 2022-11-22 RX ADMIN — FLUTICASONE FUROATE AND VILANTEROL TRIFENATATE 1 PUFF: 200; 25 POWDER RESPIRATORY (INHALATION) at 08:11

## 2022-11-22 NOTE — DISCHARGE INSTRUCTIONS
TAKE ALL YOUR MEDICINES AS PRESCRIBED  FOLLOW YOUR DIET  SHOW UP TO **ALL** YOUR APPOINTMENTS  EXERCISE AS MUCH AS YOU CAN REGULARLY  
no

## 2022-11-22 NOTE — CONSULTS
Staff Transplant Nephrology:    Will wait for heart failure team to comment on heart transplant. If heart transplant feels she meets criteria for heart transplant please re consult KTM for a kidney transplant evaluation in-patient or outpatient clinic.    Enrike Ortiz MD  Transplant Nephrology

## 2022-11-22 NOTE — ASSESSMENT & PLAN NOTE
Patient is identified as having Combined Systolic and Diastolic heart failure that is Acute on Chronic. CHF is currently uncontrolled due to volume overload due to: Continued edema of extremities and Weight gain    TTE[11/17/22]  Summary   The left ventricle is severely enlarged with eccentric hypertrophy and severely decreased systolic function. The estimated ejection fraction is 10%.   The right ventricle is not well visualized but appears normal in size with moderately reduced systolic function.   Grade II left ventricular diastolic dysfunction.   Biatrial enlargement.   The estimated PA systolic pressure is 65 mmHg.   Elevated central venous pressure (15 mmHg).   Small posterolateral pericardial effusion.      Home Medications: empagliflozin 25mg, furosemide 40mg qd, sacubitriL-valsartan   mg per tablet BID, metoprolol XL 100mg    Recent Labs     11/20/22  0515 11/21/22  0427 11/22/22  0505   BUN 41* 48* 48*   CREATININE 1.9* 2.0* 2.4*   CO2 27 28 24       RECOMMENDATIONS  -Will hold furosemide PO today as labs suggestive of intravascular volume contraction.  -Please resume furosemide 40mg BID on discharge starting 11/23  -As outpatient, please obtain BMP on Friday or Monday to monitor BUN/Cr  -As outpatient, please obtain daily weights with shoes off and early morning   Dry weight: 220lbs   If weight gain of 3-4 lbs over target weight, then: Increase furosemide 40mg BID dose  to furosemide 80mg BID until target weight achieved or maximum 3 days.     -Cardiac diet with Fluid restriction at 1.5L with strict I/Os and daily weights   - Discharge med recs:   -Continue sacubitriL-valsartan   mg  BID.   -Continue metoprolol XL 100mg QD  -Change Jardiance from 25mg to 10mg qd due to renal function.  -Start Hydralazine-Isosorbide TID  -Furosemide 40mg BID  -Spironolactone not recommended due to low eGFR/CKD    - Heart failure/HTS clinic follow up for HF management and HTS evaluation.   - Will also  need to follow up with pulmonology and nephrology

## 2022-11-22 NOTE — SUBJECTIVE & OBJECTIVE
Interval History: Patient with increase in Cr from 2.0 to 2.4 after receiving lasix 80mg PO yesterday.     Review of Systems   Constitutional: Negative for chills, fever and malaise/fatigue.   Eyes:  Negative for pain and photophobia.   Cardiovascular:  Negative for chest pain, claudication, dyspnea on exertion, irregular heartbeat, leg swelling, orthopnea and palpitations.   Respiratory:  Negative for shortness of breath, sputum production and wheezing.    Gastrointestinal:  Negative for abdominal pain.   Neurological:  Negative for headaches and light-headedness.   Objective:     Vital Signs (Most Recent):  Temp: 97.9 °F (36.6 °C) (11/22/22 1144)  Pulse: 80 (11/22/22 1200)  Resp: 18 (11/22/22 1144)  BP: 124/71 (11/22/22 1144)  SpO2: (!) 92 % (11/22/22 1144)   Vital Signs (24h Range):  Temp:  [96 °F (35.6 °C)-98.2 °F (36.8 °C)] 97.9 °F (36.6 °C)  Pulse:  [61-80] 80  Resp:  [16-20] 18  SpO2:  [92 %-95 %] 92 %  BP: ()/(52-71) 124/71     Weight: 100.2 kg (220 lb 14.4 oz)  Body mass index is 35.65 kg/m².     SpO2: (!) 92 %  O2 Device (Oxygen Therapy): room air      Intake/Output Summary (Last 24 hours) at 11/22/2022 1211  Last data filed at 11/22/2022 0903  Gross per 24 hour   Intake 480 ml   Output 200 ml   Net 280 ml       Lines/Drains/Airways       Peripheral Intravenous Line  Duration                  Peripheral IV - Single Lumen 11/15/22 1659 20 G Right Antecubital 6 days                    Physical Exam  Vitals reviewed.   Constitutional:       General: She is not in acute distress.     Appearance: Normal appearance. She is obese.   HENT:      Nose: Nose normal. No congestion.      Mouth/Throat:      Pharynx: Oropharynx is clear. No oropharyngeal exudate.   Eyes:      Extraocular Movements: Extraocular movements intact.      Pupils: Pupils are equal, round, and reactive to light.   Cardiovascular:      Rate and Rhythm: Normal rate and regular rhythm.      Pulses: Normal pulses.      Heart sounds: Normal  heart sounds. No murmur heard.  Pulmonary:      Effort: Pulmonary effort is normal. No respiratory distress.      Breath sounds: Normal breath sounds. No rhonchi or rales.   Abdominal:      General: Bowel sounds are normal. There is no distension.      Palpations: Abdomen is soft. There is no mass.   Musculoskeletal:         General: No swelling.      Right lower leg: No edema.      Left lower leg: No edema.   Skin:     General: Skin is warm.      Coloration: Skin is not jaundiced or pale.   Neurological:      General: No focal deficit present.      Mental Status: She is alert and oriented to person, place, and time.       Significant Labs: CMP   Recent Labs   Lab 11/21/22  0427 11/22/22  0505    140   K 3.6 3.5    105   CO2 28 24   * 140*   BUN 48* 48*   CREATININE 2.0* 2.4*   CALCIUM 9.2 9.6   ALBUMIN 3.4* 3.4*   ANIONGAP 10 11    and All pertinent lab results from the last 24 hours have been reviewed.    Significant Imaging: Echocardiogram: Transthoracic echo (TTE) complete (Cupid Only):   Results for orders placed or performed during the hospital encounter of 11/15/22   Echo   Result Value Ref Range    Ascending aorta 3.56 cm    STJ 2.90 cm    AV mean gradient 6 mmHg    Ao peak marcos 1.56 m/s    Ao VTI 22.91 cm    IVS 1.29 (A) 0.6 - 1.1 cm    LA size 5.86 cm    Left Atrium Major Axis 7.85 cm    Left Atrium Minor Axis 8.08 cm    LVIDd 7.27 (A) 3.5 - 6.0 cm    LVIDs 6.70 (A) 2.1 - 4.0 cm    LVOT diameter 2.07 cm    LVOT peak VTI 17.39 cm    Posterior Wall 1.25 (A) 0.6 - 1.1 cm    MV Peak A Marcos 1.00 m/s    E wave deceleration time 194.33 msec    MV Peak E Marcos 0.98 m/s    RA Major Axis 6.63 cm    RA Width 2.91 cm    RVDD 3.84 cm    Sinus 3.07 cm    TAPSE 1.75 cm    TR Max Marcos 3.55 m/s    TDI LATERAL 0.05 m/s    TDI SEPTAL 0.05 m/s    LA WIDTH 5.09 cm    MV stenosis pressure 1/2 time 56.36 ms    LV Diastolic Volume 278.29 mL    LV Systolic Volume 231.43 mL    LVOT peak marcos 1.12 m/s    Mr max marcos 0.05  m/s    LA volume (mod) 168.66 cm3    LV LATERAL E/E' RATIO 19.60 m/s    LV SEPTAL E/E' RATIO 19.60 m/s    FS 8 %    LA volume 201.90 cm3    LV mass 466.38 g    Left Ventricle Relative Wall Thickness 0.34 cm    AV valve area 2.55 cm2    AV Velocity Ratio 0.72     AV index (prosthetic) 0.76     MV valve area p 1/2 method 3.90 cm2    E/A ratio 0.98     Mean e' 0.05 m/s    LVOT area 3.4 cm2    LVOT stroke volume 58.49 cm3    AV peak gradient 10 mmHg    E/E' ratio 19.60 m/s    LV Systolic Volume Index 110.7 mL/m2    LV Diastolic Volume Index 133.15 mL/m2    LA Volume Index 96.6 mL/m2    LV Mass Index 223 g/m2    Triscuspid Valve Regurgitation Peak Gradient 50 mmHg    LA Volume Index (Mod) 80.7 mL/m2    BSA 2.16 m2    Right Atrial Pressure (from IVC) 15 mmHg    EF 10 %    TV rest pulmonary artery pressure 65 mmHg    Narrative    · The left ventricle is severely enlarged with eccentric hypertrophy and   severely decreased systolic function. The estimated ejection fraction is   10%.  · The right ventricle is not well visualized but appears normal in size   with moderately reduced systolic function.  · Grade II left ventricular diastolic dysfunction.  · Biatrial enlargement.  · The estimated PA systolic pressure is 65 mmHg.  · Elevated central venous pressure (15 mmHg).  · Small posterolateral pericardial effusion.

## 2022-11-22 NOTE — PLAN OF CARE
Problem: Adult Inpatient Plan of Care  Goal: Plan of Care Review  Outcome: Ongoing, Progressing  Goal: Patient-Specific Goal (Individualized)  Outcome: Ongoing, Progressing  Goal: Absence of Hospital-Acquired Illness or Injury  Outcome: Ongoing, Progressing  Goal: Optimal Comfort and Wellbeing  Outcome: Ongoing, Progressing  Goal: Readiness for Transition of Care  Outcome: Ongoing, Progressing     Problem: Diabetes Comorbidity  Goal: Blood Glucose Level Within Targeted Range  Outcome: Ongoing, Progressing     Problem: Fluid and Electrolyte Imbalance (Acute Kidney Injury/Impairment)  Goal: Fluid and Electrolyte Balance  Outcome: Ongoing, Progressing     Problem: Oral Intake Inadequate (Acute Kidney Injury/Impairment)  Goal: Optimal Nutrition Intake  Outcome: Ongoing, Progressing     Problem: Renal Function Impairment (Acute Kidney Injury/Impairment)  Goal: Effective Renal Function  Outcome: Ongoing, Progressing     Reviewed plan of care with emphasis on cardiac meds and discharge plan/instructions.  Patient verbalized understanding and agreement with plan of care.

## 2022-11-22 NOTE — PLAN OF CARE
Problem: Adult Inpatient Plan of Care  Goal: Plan of Care Review  Outcome: Ongoing, Progressing  Goal: Patient-Specific Goal (Individualized)  Outcome: Ongoing, Progressing  Goal: Absence of Hospital-Acquired Illness or Injury  Outcome: Ongoing, Progressing  Goal: Optimal Comfort and Wellbeing  Outcome: Ongoing, Progressing     Problem: Diabetes Comorbidity  Goal: Blood Glucose Level Within Targeted Range  Outcome: Ongoing, Progressing     Problem: Fluid and Electrolyte Imbalance (Acute Kidney Injury/Impairment)  Goal: Fluid and Electrolyte Balance  Outcome: Ongoing, Progressing     Problem: Oral Intake Inadequate (Acute Kidney Injury/Impairment)  Goal: Optimal Nutrition Intake  Outcome: Ongoing, Progressing     Problem: Renal Function Impairment (Acute Kidney Injury/Impairment)  Goal: Effective Renal Function  Outcome: Ongoing, Progressing

## 2022-11-22 NOTE — NURSING
, patient reluctant to take all prescribed meds.  Notified MD of concern.  Dr Parrish responded to educate that cardiac meds are prescribed  for cardiac function, not for pressure, and patient should take all of them.  Patient agreed to take all cardiac meds.  Educated her to call for assistance before trying to ambulate.

## 2022-11-22 NOTE — PROGRESS NOTES
Hospital Medicine  Progress note    Team: [facility] MED Z Karyn Parrish MD  Admit Date: 11/15/2022    Principal Problem:  Acute on chronic combined systolic and diastolic heart failure    Interval hx:  Symptoms improved. She is now on room air    ROS   Respiratory: neg for cough, no wheezing  cardiovascular: neg for chest pain neg for palpitations  Gastrointestinal: neg for nausea neg for vomiting, neg for abdominal pain neg for diarrhea neg for constipation   Behavioral/Psych: neg for depression neg for anxiety    PEx  Temp:  [96 °F (35.6 °C)-98.1 °F (36.7 °C)]   Pulse:  [59-75]   Resp:  [16-18]   BP: (103-123)/(52-76)   SpO2:  [93 %-95 %]     Intake/Output Summary (Last 24 hours) at 11/21/2022 2303  Last data filed at 11/21/2022 1756  Gross per 24 hour   Intake 1080 ml   Output 400 ml   Net 680 ml         General Appearance: no acute distress, WD, obese  Heart: regular rate and rhythm, no heave, no edema of BLE  Respiratory: Normal respiratory effort, symmetric excursion, bibasilar crackles minimal  Abdomen: Soft, non-tender; bowel sounds active  Skin: intact, no rash, no ulcers  Neurologic:  No focal numbness or weakness  Mental status: Alert, oriented x 4, affect appropriate     Recent Labs   Lab 11/16/22  0351 11/17/22  0410 11/18/22  0528   WBC 7.83 7.57 7.45   HGB 7.8* 7.8* 7.9*   HCT 26.5* 26.1* 27.2*    196 178       Recent Labs   Lab 11/19/22  1154 11/20/22  0515 11/21/22  0427    141 142   K 4.0 3.3* 3.6    104 104   CO2 25 27 28   BUN 40* 41* 48*   CREATININE 1.9* 1.9* 2.0*   * 130* 132*   CALCIUM 9.2 9.3 9.2   MG 1.8 1.7 2.1   PHOS 3.0 3.1 3.8       Recent Labs   Lab 11/16/22  0022 11/16/22  0351 11/17/22  0410 11/18/22  0528 11/19/22  1154 11/20/22  0515 11/21/22  0427   ALKPHOS  --  49* 44* 43*  --   --   --    ALT  --  34 24 19  --   --   --    AST  --  18 13 12  --   --   --    ALBUMIN  --  3.4* 3.1* 3.1* 3.5 3.4* 3.4*   PROT  --  6.0 5.6* 5.5*  --   --   --    BILITOT  --   2.0* 1.8* 1.6*  --   --   --    INR 1.1  --   --   --   --   --   --           Recent Labs   Lab 11/20/22  1606 11/20/22 2024 11/21/22  0732 11/21/22  1139 11/21/22  1542 11/21/22 2028   POCTGLUCOSE 141* 227* 152* 173* 179* 168*         Scheduled Meds:   albuterol-ipratropium  3 mL Nebulization BID    allopurinoL  100 mg Oral Daily    amiodarone  200 mg Oral Daily    apixaban  5 mg Oral BID    atorvastatin  40 mg Oral QHS    dextromethorphan-guaiFENesin  mg  2 tablet Oral BID    ferrous sulfate  1 tablet Oral Daily    [START ON 11/22/2022] fluconazole  150 mg Oral Once    fluticasone furoate-vilanteroL  1 puff Inhalation Daily    fluticasone propionate  2 spray Each Nostril Daily    [START ON 11/22/2022] furosemide  80 mg Oral BID    hydrALAZINE  75 mg Oral TID    isosorbide dinitrate  40 mg Oral TID    metoprolol succinate  100 mg Oral Daily    montelukast  10 mg Oral Daily    pantoprazole  40 mg Oral Daily    polyethylene glycol  17 g Oral Daily    potassium chloride  30 mEq Oral with breakfast    sacubitriL-valsartan  1 tablet Oral BID    tiotropium bromide  2 puff Inhalation Daily     Continuous Infusions:  As Needed:  ALPRAZolam, dextrose 10%, dextrose 10%, glucagon (human recombinant), glucose, glucose, guaiFENesin 100 mg/5 ml, hydrocodone-apap 7.5-325 MG/15 ML, insulin aspart U-100, melatonin, naloxone, nitroGLYCERIN, prochlorperazine, sodium chloride 0.9%, sodium chloride 0.9%    Assessment and Plan  / Problems managed today    * Acute on chronic combined systolic and diastolic heart failure  Last EF 25%. Grade II DD  Clinically moderately volume overloaded   She is on her home O2, suspect quick turnaround, BNP likely elevated in setting of home Entresto      - Diuresis with Lasix 80 mg IV QD--> 80 mg BID --> 80 mg TID - oral 80 mg TID--> BID  - Continue GDMT   - Not a great candidate for aldactone given Cr near 2.5   11/16 - trend troponin 0.7 x two. will consult cardiology.  Appreciate recs. F/u  Dr. Javier- has appoint ment next week. heart/ kidney transplant eval would be done as outpatient    Acute on chronic respiratory failure with hypoxia  Home O2- 2 L's, currently at baseline, however moderately volume overloaded with WASHINGTON likely 2/2 cardiorenal   - Diurese and trend kidney function   - presented w increased SB and work of breathing, volume overload, W[place] and NSTEMI    Hypertensive cardiovascular-renal disease, stage 1-4 or unspecified chronic kidney disease, with heart failure  See above      WASHINGTON (acute kidney injury)  Baseline Creatinine: 1.8-2.1  - Creatinine on admission: 2.4  - Urine lytes (Urine Na, Urine Cr, Urine Urea if on diuretic) and UA ordered   - Consider US of the retroperitoneum  - PRN Bladder Scans ordered    11/16- cr 2.4-> 2.1 after IV laasix    Type 2 diabetes mellitus without complication, without long-term current use of insulin  - Home- Jardiance and Glimepiride  - LDSSI  - POCT checks ordered   Recent Labs     11/17/22  1610 11/17/22  1932 11/18/22  0708 11/18/22  1108 11/18/22  1559 11/18/22  1936   POCTGLUCOSE 161* 177* 148* 162* 170* 190*       elevated bilirubin d/t Gilbert's syndrome  - Monitor CMP       ICD (implantable cardioverter-defibrillator) in place  - Tele       Depression due to physical illness  Home Med- Cymbalta for chronic medical illnesses, not compliant at present       Paroxysmal atrial fibrillation  - Continue amio  - Continue Toprol   - Increase Apixaban to correct dose (was on 2.5 mg BID, unclear why as she only meets 1/3 criteria for dose reduction)- may be worth investigating further, denies any GI bleeding but rather occasional bruising   11/16- HR stable      MELISSA (obstructive sleep apnea)  - CPAP ordered     Essential hypertension  Continue home meds     Elevated troponin  - Likely elevated in setting of CKD- repeat and trend to peak   - 0.7 x 2, consult Cardiology      Discharge Planning   CHIQUI: 11/22/2022     Code Status: Full Code   Is the patient  medically ready for discharge?: No    Reason for patient still in hospital (select all that apply): Patient trending condition and Treatment  Discharge Plan A: Home with family        Diet:  low salt  GI PPx: protonix  DVT PPx:  apixaban  Airways: room air  Wounds:none    Goals of Care:  Return to prior functional status       Time (minutes) spent in care of the patient (Greater than 1/2 spent in direct face-to-face contact and care coordination on unit) 35 min  Karyn Parrish MD

## 2022-11-22 NOTE — PLAN OF CARE
APPOINTMENT:    Provider requires schedule review by Office Nurse - Office to call patient with date and time.

## 2022-11-22 NOTE — PROGRESS NOTES
[facility] - Med Surg  Cardiology  Progress Note    Patient Name: Hafsa Hawley  MRN: [id number]  Admission Date: 11/15/2022  Hospital Length of Stay: 7 days  Code Status: Full Code   Attending Physician: Karyn Parrish MD   Primary Care Physician: Cristobal Ann MD  Expected Discharge Date: 11/22/2022  Principal Problem:Acute on chronic combined systolic and diastolic heart failure    Subjective:     Hospital Course:   No notes on file    Interval History: Patient with increase in Cr from 2.0 to 2.4 after receiving lasix 80mg PO yesterday.     Review of Systems   Constitutional: Negative for chills, fever and malaise/fatigue.   Eyes:  Negative for pain and photophobia.   Cardiovascular:  Negative for chest pain, claudication, dyspnea on exertion, irregular heartbeat, leg swelling, orthopnea and palpitations.   Respiratory:  Negative for shortness of breath, sputum production and wheezing.    Gastrointestinal:  Negative for abdominal pain.   Neurological:  Negative for headaches and light-headedness.   Objective:     Vital Signs (Most Recent):  Temp: 97.9 °F (36.6 °C) (11/22/22 1144)  Pulse: 80 (11/22/22 1200)  Resp: 18 (11/22/22 1144)  BP: 124/71 (11/22/22 1144)  SpO2: (!) 92 % (11/22/22 1144)   Vital Signs (24h Range):  Temp:  [96 °F (35.6 °C)-98.2 °F (36.8 °C)] 97.9 °F (36.6 °C)  Pulse:  [61-80] 80  Resp:  [16-20] 18  SpO2:  [92 %-95 %] 92 %  BP: ()/(52-71) 124/71     Weight: 100.2 kg (220 lb 14.4 oz)  Body mass index is 35.65 kg/m².     SpO2: (!) 92 %  O2 Device (Oxygen Therapy): room air      Intake/Output Summary (Last 24 hours) at 11/22/2022 1211  Last data filed at 11/22/2022 0903  Gross per 24 hour   Intake 480 ml   Output 200 ml   Net 280 ml       Lines/Drains/Airways       Peripheral Intravenous Line  Duration                  Peripheral IV - Single Lumen 11/15/22 1659 20 G Right Antecubital 6 days                    Physical Exam  Vitals reviewed.   Constitutional:       General: She is not in acute  distress.     Appearance: Normal appearance. She is obese.   HENT:      Nose: Nose normal. No congestion.      Mouth/Throat:      Pharynx: Oropharynx is clear. No oropharyngeal exudate.   Eyes:      Extraocular Movements: Extraocular movements intact.      Pupils: Pupils are equal, round, and reactive to light.   Cardiovascular:      Rate and Rhythm: Normal rate and regular rhythm.      Pulses: Normal pulses.      Heart sounds: Normal heart sounds. No murmur heard.  Pulmonary:      Effort: Pulmonary effort is normal. No respiratory distress.      Breath sounds: Normal breath sounds. No rhonchi or rales.   Abdominal:      General: Bowel sounds are normal. There is no distension.      Palpations: Abdomen is soft. There is no mass.   Musculoskeletal:         General: No swelling.      Right lower leg: No edema.      Left lower leg: No edema.   Skin:     General: Skin is warm.      Coloration: Skin is not jaundiced or pale.   Neurological:      General: No focal deficit present.      Mental Status: She is alert and oriented to person, place, and time.       Significant Labs: CMP   Recent Labs   Lab 11/21/22  0427 11/22/22  0505    140   K 3.6 3.5    105   CO2 28 24   * 140*   BUN 48* 48*   CREATININE 2.0* 2.4*   CALCIUM 9.2 9.6   ALBUMIN 3.4* 3.4*   ANIONGAP 10 11    and All pertinent lab results from the last 24 hours have been reviewed.    Significant Imaging: Echocardiogram: Transthoracic echo (TTE) complete (Cupid Only):   Results for orders placed or performed during the hospital encounter of 11/15/22   Echo   Result Value Ref Range    Ascending aorta 3.56 cm    STJ 2.90 cm    AV mean gradient 6 mmHg    Ao peak gracie 1.56 m/s    Ao VTI 22.91 cm    IVS 1.29 (A) 0.6 - 1.1 cm    LA size 5.86 cm    Left Atrium Major Axis 7.85 cm    Left Atrium Minor Axis 8.08 cm    LVIDd 7.27 (A) 3.5 - 6.0 cm    LVIDs 6.70 (A) 2.1 - 4.0 cm    LVOT diameter 2.07 cm    LVOT peak VTI 17.39 cm    Posterior Wall 1.25 (A)  0.6 - 1.1 cm    MV Peak A Marcos 1.00 m/s    E wave deceleration time 194.33 msec    MV Peak E Marcos 0.98 m/s    RA Major Axis 6.63 cm    RA Width 2.91 cm    RVDD 3.84 cm    Sinus 3.07 cm    TAPSE 1.75 cm    TR Max Marcos 3.55 m/s    TDI LATERAL 0.05 m/s    TDI SEPTAL 0.05 m/s    LA WIDTH 5.09 cm    MV stenosis pressure 1/2 time 56.36 ms    LV Diastolic Volume 278.29 mL    LV Systolic Volume 231.43 mL    LVOT peak marcos 1.12 m/s    Mr max marcos 0.05 m/s    LA volume (mod) 168.66 cm3    LV LATERAL E/E' RATIO 19.60 m/s    LV SEPTAL E/E' RATIO 19.60 m/s    FS 8 %    LA volume 201.90 cm3    LV mass 466.38 g    Left Ventricle Relative Wall Thickness 0.34 cm    AV valve area 2.55 cm2    AV Velocity Ratio 0.72     AV index (prosthetic) 0.76     MV valve area p 1/2 method 3.90 cm2    E/A ratio 0.98     Mean e' 0.05 m/s    LVOT area 3.4 cm2    LVOT stroke volume 58.49 cm3    AV peak gradient 10 mmHg    E/E' ratio 19.60 m/s    LV Systolic Volume Index 110.7 mL/m2    LV Diastolic Volume Index 133.15 mL/m2    LA Volume Index 96.6 mL/m2    LV Mass Index 223 g/m2    Triscuspid Valve Regurgitation Peak Gradient 50 mmHg    LA Volume Index (Mod) 80.7 mL/m2    BSA 2.16 m2    Right Atrial Pressure (from IVC) 15 mmHg    EF 10 %    TV rest pulmonary artery pressure 65 mmHg    Narrative    · The left ventricle is severely enlarged with eccentric hypertrophy and   severely decreased systolic function. The estimated ejection fraction is   10%.  · The right ventricle is not well visualized but appears normal in size   with moderately reduced systolic function.  · Grade II left ventricular diastolic dysfunction.  · Biatrial enlargement.  · The estimated PA systolic pressure is 65 mmHg.  · Elevated central venous pressure (15 mmHg).  · Small posterolateral pericardial effusion.        Assessment and Plan:       * Acute on chronic combined systolic and diastolic heart failure  Patient is identified as having Combined Systolic and Diastolic heart failure  that is Acute on Chronic. CHF is currently uncontrolled due to volume overload due to: Continued edema of extremities and Weight gain    TTE[11/17/22]  Summary  The left ventricle is severely enlarged with eccentric hypertrophy and severely decreased systolic function. The estimated ejection fraction is 10%.  The right ventricle is not well visualized but appears normal in size with moderately reduced systolic function.  Grade II left ventricular diastolic dysfunction.  Biatrial enlargement.  The estimated PA systolic pressure is 65 mmHg.  Elevated central venous pressure (15 mmHg).  Small posterolateral pericardial effusion.      Home Medications: empagliflozin 25mg, furosemide 40mg qd, sacubitriL-valsartan   mg per tablet BID, metoprolol XL 100mg    Recent Labs     11/20/22  0515 11/21/22  0427 11/22/22  0505   BUN 41* 48* 48*   CREATININE 1.9* 2.0* 2.4*   CO2 27 28 24       RECOMMENDATIONS  -Will hold furosemide PO today as labs suggestive of intravascular volume contraction.  -Please resume furosemide 40mg BID on discharge starting 11/23  -As outpatient, please obtain BMP on Friday or Monday to monitor BUN/Cr  -As outpatient, please obtain daily weights with shoes off and early morning   Dry weight: 220lbs   If weight gain of 3-4 lbs over target weight, then: Increase furosemide 40mg BID dose  to furosemide 80mg BID until target weight achieved or maximum 3 days.     -Cardiac diet with Fluid restriction at 1.5L with strict I/Os and daily weights   - Discharge med recs:   -Continue sacubitriL-valsartan   mg  BID.   -Continue metoprolol XL 100mg QD  -Change Jardiance from 25mg to 10mg qd due to renal function.  -Start Hydralazine-Isosorbide TID  -Furosemide 40mg BID  -Spironolactone not recommended due to low eGFR/CKD    - Heart failure/HTS clinic follow up for HF management and HTS evaluation.   - Will also need to follow up with pulmonology and nephrology    Paroxysmal atrial fibrillation  Normal  sinus rhythm on EKG    [id number] Score: 4    RECOMMENDATIONS  -Continue amiodarone  -Continue Toprol  -Anticoagulation: On apixaban 2.5mg BID at home(unclear why?). Does not meet criteria for dose adjustment, change to apixaban 5mg BID      Elevated troponin  Troponin remains flat at around 0.7-0.8. Likely secondary to CKD, chronic dilated cardiomyopathy, and demand ischemia secondary to HF.      Will sign off at this time. Please call cardiology if change in clinical status.      VTE Risk Mitigation (From admission, onward)           Ordered     apixaban tablet 5 mg  2 times daily         11/15/22 2229     Reason for No Pharmacological VTE Prophylaxis  Once        Question:  Reasons:  Answer:  Physician Provided (leave comment)  Comment:  apixaban    11/15/22 2227     IP VTE HIGH RISK PATIENT  Once         11/15/22 2227     Place sequential compression device  Until discontinued         11/15/22 2111                    Asif Henderson MD  Cardiology  [facility] - Med Surg

## 2022-11-22 NOTE — NURSING
"Patient is overwhelmed with the number of pills she takes daily stating "I'm just tired of taking all these meds."  She holds pills and stares at cup for quite some time before taking her meds.  Walked entire length of hallway on room air.  Sat remained 93% on room air.  IV and tele box removed.  Patient ready for discharge home.  "

## 2022-11-23 ENCOUNTER — TELEPHONE (OUTPATIENT)
Dept: HEPATOLOGY | Facility: CLINIC | Age: 57
End: 2022-11-23
Payer: MEDICAID

## 2022-11-23 ENCOUNTER — PATIENT OUTREACH (OUTPATIENT)
Dept: ADMINISTRATIVE | Facility: CLINIC | Age: 57
End: 2022-11-23
Payer: MEDICAID

## 2022-11-23 NOTE — PROGRESS NOTES
C3 nurse spoke with Hafsa Hawley  for a TCC post hospital discharge follow up call. The patient has a scheduled [facility] appointment with Krysta Hicks MD on 11/30/2022 @ [facility].

## 2022-11-23 NOTE — PROGRESS NOTES
C3 nurse attempted to contact Hafsa Hawley  for a TCC post hospital discharge follow up call. The patient is unable to conduct the call @ this time. The patient requested a callback.    The patient has a scheduled [facility] appointment with Krysta Tony MD on 11/30/2022 @ 1300H. Message sent to Physician staff.

## 2022-11-23 NOTE — PROGRESS NOTES
C3 nurse attempted to contact Hafsa Hawley  for a TCC post hospital discharge follow up call. No answer. Left voicemail with callback information. The patient has a scheduled HOSFU appointment with Krysta Tony MD on 12/01/2022 @ 1300.

## 2022-11-23 NOTE — TELEPHONE ENCOUNTER
Patient notified.        ----- Message from Jemma Foy RN sent at 11/23/2022  9:33 AM CST -----  Regarding: RE: Appt schedule  It appears that this is for Heart Transplant     Jemma Headley  ----- Message -----  From: Lety Hansen RN  Sent: 11/22/2022   4:39 PM CST  To: Jemma Foy RN  Subject: FW: Appt schedule                                I do not see referral to Hepatology or mention in chart.  Please advise,  Thanks    ----- Message -----  From: Robbie Blankenship  Sent: 11/22/2022   2:58 PM CST  To: [facility] Hepat Clinical Staff  Subject: Appt schedule                                    Pt needs to have a follow up appt scheduled for 4 weeks out          Contact Hafsa @ [phone]

## 2022-11-25 ENCOUNTER — TELEPHONE (OUTPATIENT)
Dept: TRANSPLANT | Facility: CLINIC | Age: 57
End: 2022-11-25
Payer: MEDICAID

## 2022-11-28 NOTE — PHYSICIAN QUERY
PT Name: Hafsa Hawley  MR #: [id number]     DOCUMENTATION CLARIFICATION      CDS/: Sheree Ruano RN              Contact information:[email or web address]  This form is a permanent document in the medical record.    Query Date: November 28, 2022    By submitting this query, we are merely seeking further clarification of documentation to reflect the severity of illness of your patient. Please utilize your independent clinical judgment when addressing the question(s) below.     The Medical Record contains the following:   Indicators   Supporting Clinical Findings Location in Medical Record    Chest Pain, Angina      Coronary Artery Disease     x EKG Sinus rhythm with Premature atrial complexes with occasional Premature   ventricular complexes   Left atrial enlargement   Left axis deviation   LVH with repolarization abnormality ( R in aVL , Chapin product )   Prolonged QT    EKG 11-16   x Troponin Troponin remains flat at around 0.7-0.8. Cardiology note 11-16    Echo Results      Angiography     x Documentation of acute cardiac condition NSTEMI   HM note 11-17    Medication/Treatment Home Medications: empagliflozin 25mg, furosemide 40mg qd, sacubitriL-valsartan   mg per tablet BID, metoprolol XL 100mg Cardiology note 11-22   x Other Troponin remains flat at around 0.7-0.8. Likely secondary to CKD, chronic dilated cardiomyopathy, and demand ischemia secondary to HF. Cardiology note 11-16      Provider, please clarify the cardiac diagnosis related to the above documentation:  [   ] NSTEMI   [  x ] Demand Ischemia   [   ] Other Cardiac Diagnosis (please specify): _______________   [   ] Clinically Undetermined       Please document in your progress notes daily for the duration of treatment until resolved, and include in your discharge summary.  Form No. [id number]

## 2022-11-29 ENCOUNTER — TELEPHONE (OUTPATIENT)
Dept: FAMILY MEDICINE | Facility: CLINIC | Age: 57
End: 2022-11-29
Payer: MEDICAID

## 2022-11-29 DIAGNOSIS — M10.9 GOUT, ARTHRITIS: Primary | ICD-10-CM

## 2022-11-29 RX ORDER — HYDROCODONE BITARTRATE AND ACETAMINOPHEN 5; 325 MG/1; MG/1
1 TABLET ORAL EVERY 6 HOURS PRN
Qty: 28 TABLET | Refills: 0 | Status: SHIPPED | OUTPATIENT
Start: 2022-11-29 | End: 2022-12-06

## 2022-11-29 NOTE — TELEPHONE ENCOUNTER
----- Message from Frank Cook sent at 2022  9:44 AM CST -----  Contact: patient  Hafsa Hawley  MRN: [id number]  : 1965  PCP: Cristobal Ann  Home Phone      [phone]  Work Phone      Not on file.  Mobile          [phone]  Home Phone      [phone]      MESSAGE:   Pt requesting refill or new Rx.   Is this a refill or new RX:  refill  RX name and strength: Hydrocodone 7.5-325 mg  Last office visit: 11/15/22  Is this a 30-day or 90-day RX:  30 day  Pharmacy name and location:  [facility] [place]  Comments: states needed for Gout     Phone:  [phone]    PCP: Seth

## 2022-11-30 ENCOUNTER — DOCUMENTATION ONLY (OUTPATIENT)
Dept: CARDIAC REHAB | Facility: CLINIC | Age: 57
End: 2022-11-30
Payer: MEDICAID

## 2022-11-30 ENCOUNTER — OFFICE VISIT (OUTPATIENT)
Dept: FAMILY MEDICINE | Facility: CLINIC | Age: 57
End: 2022-11-30
Payer: MEDICAID

## 2022-11-30 VITALS
OXYGEN SATURATION: 94 % | HEIGHT: 66 IN | BODY MASS INDEX: 35.65 KG/M2 | DIASTOLIC BLOOD PRESSURE: 68 MMHG | SYSTOLIC BLOOD PRESSURE: 116 MMHG | RESPIRATION RATE: 12 BRPM | HEART RATE: 95 BPM

## 2022-11-30 DIAGNOSIS — I50.9 CONGESTIVE HEART FAILURE, UNSPECIFIED HF CHRONICITY, UNSPECIFIED HEART FAILURE TYPE: Primary | ICD-10-CM

## 2022-11-30 DIAGNOSIS — E13.9 DIABETES MELLITUS TYPE 1.5, MANAGED AS TYPE 2: ICD-10-CM

## 2022-11-30 DIAGNOSIS — Z79.4 TYPE 2 DIABETES MELLITUS WITH STAGE 4 CHRONIC KIDNEY DISEASE, WITH LONG-TERM CURRENT USE OF INSULIN: ICD-10-CM

## 2022-11-30 DIAGNOSIS — I25.10 CORONARY ARTERY DISEASE, UNSPECIFIED VESSEL OR LESION TYPE, UNSPECIFIED WHETHER ANGINA PRESENT, UNSPECIFIED WHETHER NATIVE OR TRANSPLANTED HEART: ICD-10-CM

## 2022-11-30 DIAGNOSIS — E11.22 TYPE 2 DIABETES MELLITUS WITH STAGE 4 CHRONIC KIDNEY DISEASE, WITH LONG-TERM CURRENT USE OF INSULIN: ICD-10-CM

## 2022-11-30 DIAGNOSIS — N18.4 CKD (CHRONIC KIDNEY DISEASE), STAGE IV: ICD-10-CM

## 2022-11-30 DIAGNOSIS — N18.4 TYPE 2 DIABETES MELLITUS WITH STAGE 4 CHRONIC KIDNEY DISEASE, WITH LONG-TERM CURRENT USE OF INSULIN: ICD-10-CM

## 2022-11-30 PROCEDURE — 99999 PR PBB SHADOW E&M-EST. PATIENT-LVL V: CPT | Mod: PBBFAC,,, | Performed by: FAMILY MEDICINE

## 2022-11-30 PROCEDURE — 3074F SYST BP LT 130 MM HG: CPT | Mod: CPTII,,, | Performed by: FAMILY MEDICINE

## 2022-11-30 PROCEDURE — 4010F PR ACE/ARB THEARPY RXD/TAKEN: ICD-10-PCS | Mod: CPTII,,, | Performed by: FAMILY MEDICINE

## 2022-11-30 PROCEDURE — 3074F PR MOST RECENT SYSTOLIC BLOOD PRESSURE < 130 MM HG: ICD-10-PCS | Mod: CPTII,,, | Performed by: FAMILY MEDICINE

## 2022-11-30 PROCEDURE — 1159F PR MEDICATION LIST DOCUMENTED IN MEDICAL RECORD: ICD-10-PCS | Mod: CPTII,,, | Performed by: FAMILY MEDICINE

## 2022-11-30 PROCEDURE — 99214 OFFICE O/P EST MOD 30 MIN: CPT | Mod: S$PBB,,, | Performed by: FAMILY MEDICINE

## 2022-11-30 PROCEDURE — 3066F NEPHROPATHY DOC TX: CPT | Mod: CPTII,,, | Performed by: FAMILY MEDICINE

## 2022-11-30 PROCEDURE — 3008F BODY MASS INDEX DOCD: CPT | Mod: CPTII,,, | Performed by: FAMILY MEDICINE

## 2022-11-30 PROCEDURE — 99215 OFFICE O/P EST HI 40 MIN: CPT | Mod: PBBFAC | Performed by: FAMILY MEDICINE

## 2022-11-30 PROCEDURE — 4010F ACE/ARB THERAPY RXD/TAKEN: CPT | Mod: CPTII,,, | Performed by: FAMILY MEDICINE

## 2022-11-30 PROCEDURE — 99214 PR OFFICE/OUTPT VISIT, EST, LEVL IV, 30-39 MIN: ICD-10-PCS | Mod: S$PBB,,, | Performed by: FAMILY MEDICINE

## 2022-11-30 PROCEDURE — 99999 PR PBB SHADOW E&M-EST. PATIENT-LVL V: ICD-10-PCS | Mod: PBBFAC,,, | Performed by: FAMILY MEDICINE

## 2022-11-30 PROCEDURE — 3066F PR DOCUMENTATION OF TREATMENT FOR NEPHROPATHY: ICD-10-PCS | Mod: CPTII,,, | Performed by: FAMILY MEDICINE

## 2022-11-30 PROCEDURE — 3078F DIAST BP <80 MM HG: CPT | Mod: CPTII,,, | Performed by: FAMILY MEDICINE

## 2022-11-30 PROCEDURE — 1159F MED LIST DOCD IN RCRD: CPT | Mod: CPTII,,, | Performed by: FAMILY MEDICINE

## 2022-11-30 PROCEDURE — 3044F HG A1C LEVEL LT 7.0%: CPT | Mod: CPTII,,, | Performed by: FAMILY MEDICINE

## 2022-11-30 PROCEDURE — 3008F PR BODY MASS INDEX (BMI) DOCUMENTED: ICD-10-PCS | Mod: CPTII,,, | Performed by: FAMILY MEDICINE

## 2022-11-30 PROCEDURE — 3078F PR MOST RECENT DIASTOLIC BLOOD PRESSURE < 80 MM HG: ICD-10-PCS | Mod: CPTII,,, | Performed by: FAMILY MEDICINE

## 2022-11-30 PROCEDURE — 3044F PR MOST RECENT HEMOGLOBIN A1C LEVEL <7.0%: ICD-10-PCS | Mod: CPTII,,, | Performed by: FAMILY MEDICINE

## 2022-11-30 RX ORDER — PROMETHAZINE HYDROCHLORIDE AND DEXTROMETHORPHAN HYDROBROMIDE 6.25; 15 MG/5ML; MG/5ML
5 SYRUP ORAL EVERY 4 HOURS PRN
Qty: 120 ML | Refills: 0 | Status: SHIPPED | OUTPATIENT
Start: 2022-11-30 | End: 2022-12-10

## 2022-11-30 RX ORDER — CETIRIZINE HYDROCHLORIDE 10 MG/1
TABLET ORAL
COMMUNITY
Start: 2022-09-27 | End: 2022-12-16

## 2022-11-30 RX ORDER — DULOXETIN HYDROCHLORIDE 30 MG/1
CAPSULE, DELAYED RELEASE ORAL
Status: ON HOLD | COMMUNITY
Start: 2022-11-08 | End: 2023-02-20

## 2022-11-30 RX ORDER — METHYLPREDNISOLONE 4 MG/1
TABLET ORAL
COMMUNITY
Start: 2022-09-27 | End: 2022-12-08

## 2022-11-30 RX ORDER — CETIRIZINE HYDROCHLORIDE 10 MG/1
10 TABLET ORAL DAILY PRN
Status: ON HOLD | COMMUNITY
Start: 2022-09-27 | End: 2023-06-30 | Stop reason: SDUPTHER

## 2022-11-30 RX ORDER — METOCLOPRAMIDE 5 MG/1
1 TABLET ORAL 2 TIMES DAILY
COMMUNITY
Start: 2022-04-11 | End: 2023-01-04

## 2022-11-30 NOTE — PROGRESS NOTES
Letter sent to pt re: cardiac rehab at [facility] cardiac rehab which is near her home.

## 2022-11-30 NOTE — PROGRESS NOTES
Transitional Care Note  Subjective:       Patient ID: Hafsa Hawley is a 57 y.o. female.  Chief Complaint: Other and Follow-up (Pt was in the hospital 11/15/2022/Pt states she has been feeling weak/Pt states she has been having dizzy spells)    Family and/or Caretaker present at visit?  No.  Diagnostic tests reviewed/disposition: I have reviewed all completed as well as pending diagnostic tests at the time of discharge.  Disease/illness education: CHF  Home health/community services discussion/referrals: Patient does not have home health established from hospital visit.  They do not need home health.  If needed, we will set up home health for the patient.   Establishment or re-establishment of referral orders for community resources: No other necessary community resources.   Discussion with other health care providers: No discussion with other health care providers necessary.   56 y/o F with h/o NICM, combined systolic and diastolic CHF (EF 25%, Grade II DD), s/p AICD, COPD (home 2 L's O2), pHTN, NIDDM (A1c 6.5) and permanent A-fib is f/u from recent readmission to [facility] for systolic heart failure. She had been overdiuresed and her diuretic dosing was lowered. She felt overloaded and short of breath and had gained weight. She was admitted for a week and meds were adjusted. She notes that she was started on bidil and amiodarone. She feels her blood pressures are low at times but she is not feeling short of breath. She also notes a slight cough. No fever. She has an upcoming appt with dr augustin, dr amin and dr. Ann.          Follow-up  Associated symptoms include coughing. Pertinent negatives include no abdominal pain, chest pain, chills, congestion, fever, headaches, nausea, rash, sore throat, vomiting or weakness.   Review of Systems   Constitutional:  Negative for chills and fever.   HENT:  Negative for congestion, ear pain, postnasal drip, rhinorrhea, sore throat and trouble swallowing.    Eyes:  Negative for  redness and itching.   Respiratory:  Positive for cough. Negative for shortness of breath and wheezing.    Cardiovascular:  Negative for chest pain and palpitations.   Gastrointestinal:  Negative for abdominal pain, diarrhea, nausea and vomiting.   Genitourinary:  Negative for dysuria and frequency.   Skin:  Negative for rash.   Neurological:  Negative for weakness and headaches.     Objective:      Physical Exam  Vitals and nursing note reviewed.   Constitutional:       General: She is not in acute distress.     Appearance: She is well-developed. She is obese.   HENT:      Head: Normocephalic and atraumatic.      Nose: No congestion.   Eyes:      Conjunctiva/sclera: Conjunctivae normal.      Pupils: Pupils are equal, round, and reactive to light.   Neck:      Thyroid: No thyromegaly.   Cardiovascular:      Rate and Rhythm: Normal rate. Rhythm irregular.      Heart sounds: Murmur heard.   Pulmonary:      Effort: Pulmonary effort is normal. No respiratory distress.      Breath sounds: Normal breath sounds. No wheezing.   Abdominal:      General: Bowel sounds are normal.      Palpations: Abdomen is soft.      Tenderness: There is no abdominal tenderness.   Musculoskeletal:         General: Normal range of motion.      Cervical back: Normal range of motion and neck supple.   Lymphadenopathy:      Cervical: No cervical adenopathy.   Skin:     General: Skin is warm and dry.      Findings: No rash.   Neurological:      Mental Status: She is alert and oriented to person, place, and time.   Psychiatric:         Behavior: Behavior normal.       Assessment:       1. Congestive heart failure, unspecified HF chronicity, unspecified heart failure type    2. Coronary artery disease, unspecified vessel or lesion type, unspecified whether angina present, unspecified whether native or transplanted heart    3. CKD (chronic kidney disease), stage IV    4. Diabetes mellitus type 1.5, managed as type 2    5. Type 2 diabetes mellitus  with stage 4 chronic kidney disease, with long-term current use of insulin        Plan:       Hafsa was seen today for other and follow-up.    Diagnoses and all orders for this visit:    Congestive heart failure, unspecified HF chronicity, unspecified heart failure type  -     Ambulatory referral/consult to Cardiac Rehab; Future  -     Ambulatory referral/consult to Endocrinology; Future  -     Basic Metabolic Panel; Future    Coronary artery disease, unspecified vessel or lesion type, unspecified whether angina present, unspecified whether native or transplanted heart  -     Ambulatory referral/consult to Cardiac Rehab; Future  -     Ambulatory referral/consult to Endocrinology; Future  -     Basic Metabolic Panel; Future    CKD (chronic kidney disease), stage IV  -     Ambulatory referral/consult to Nephrology; Future  -     Ambulatory referral/consult to Endocrinology; Future  -     Basic Metabolic Panel; Future    Diabetes mellitus type 1.5, managed as type 2  -     Basic Metabolic Panel; Future    Type 2 diabetes mellitus with stage 4 chronic kidney disease, with long-term current use of insulin  -     Ambulatory referral/consult to Endocrinology; Future  -     Basic Metabolic Panel; Future    Other orders  -     promethazine-dextromethorphan (PROMETHAZINE-DM) 6.25-15 mg/5 mL Syrp; Take 5 mLs by mouth every 4 (four) hours as needed (cough).          Currently taking 40 daily of lasix    RTC if condition acutely worsens or any other concerns, otherwise RTC as scheduled

## 2022-12-06 ENCOUNTER — PATIENT OUTREACH (OUTPATIENT)
Dept: EMERGENCY MEDICINE | Facility: HOSPITAL | Age: 57
End: 2022-12-06
Payer: MEDICAID

## 2022-12-06 NOTE — PROGRESS NOTES
Patient stated she has been doing okay and expressed she had been in the hospital since last spoken to. Patient expressed she has all of her appointments taken care of and has no needs at this moment.    ED navigator ensured there was nothing she could help patient with. ED navigator will follow-up with patient on/around 1/25/2023.    Beulah Figueroa  ED Navigator- [place]  [phone]

## 2022-12-07 ENCOUNTER — TELEPHONE (OUTPATIENT)
Dept: FAMILY MEDICINE | Facility: CLINIC | Age: 57
End: 2022-12-07
Payer: MEDICAID

## 2022-12-07 DIAGNOSIS — E11.9 TYPE 2 DIABETES MELLITUS WITHOUT COMPLICATION: ICD-10-CM

## 2022-12-07 NOTE — TELEPHONE ENCOUNTER
----- Message from Frank Cook sent at 2022 12:42 PM CST -----  Contact: Patient  Hafsa Hawley  MRN: [id number]  : 1965  PCP: Krysta Tony  Home Phone      [phone]  Work Phone      Not on file.  Mobile          [phone]  Home Phone      [phone]      MESSAGE: at last appt Dr Tony stated she needed - Cardio rehab & therapy -- states [facility] needs orders    Call [phone]    PCP: Chavo

## 2022-12-07 NOTE — DISCHARGE SUMMARY
Discharge Summary  Hospital Medicine    Attending Provider on Discharge: Karyn Parrish MD  Hospital Medicine Team: [facility] HOSP MED Z  Date of Admission:  11/15/2022     Date of Discharge:  11/22/2022  Code status: Full Code    Active Hospital Problems    Diagnosis  POA    *Acute on chronic combined systolic and diastolic heart failure [I50.43]  Yes     Priority: 1 - High    Acute on chronic respiratory failure with hypoxia [J96.21]  Yes     Priority: 2     Heart failure [I50.9]  Yes    Prolonged Q-T interval on ECG [R94.31]  Unknown    Hypertensive cardiovascular-renal disease, stage 1-4 or unspecified chronic kidney disease, with heart failure [I13.0]  Yes    WASHINGTON (acute kidney injury) [N17.9]  Yes    Type 2 diabetes mellitus without complication, without long-term current use of insulin [E11.9]  Yes     Lab Results   Component Value Date    HGBA1C 5.7 (H) 02/15/2022           elevated bilirubin d/t Gilbert's syndrome [E80.4]  Yes     confirmed by [place] genetic testing, evaluated by hepatology      ICD (implantable cardioverter-defibrillator) in place [Z95.810]  Yes     Chronic    Depression due to physical illness [F06.31]  Yes    Paroxysmal atrial fibrillation [I48.0]  Yes     Chronic    MELISSA (obstructive sleep apnea) [G47.33]  Yes     Chronic    Essential hypertension [I10]  Yes     Chronic    Elevated troponin [R77.8]  Yes      Resolved Hospital Problems   No resolved problems to display.     HPI  58 y/o F with h/o NICM, combined systolic and diastolic CHF (EF 25%, Grade II DD), s/p AICD, COPD (home 2 L's O2), pHTN, NIDDM (A1c 6.5) and permanent A-fib that presents to [facility] with increasing shortness of breath since her recent discharge from the hospital. Of note, the patient reports that she was recently cut down from her 80 mg po QD dose to 40 mg po QD dose by her cardiologist. She has been dyspneic at rest and reports chronic cough that is productive of phlegm, however phlegm/sputum amount has not changed. Her oxygen  requirement has not increased, but she does feel more short of breath at rest. She has been urinating frequently, but does think she has gained weight since her discharge- reports dry weight at 207 lbs. Unclear weight prior to arrival. She does report fevers, chills, sharp chest pain, urinary frequency, n/v, intermittent diarrhea mixed with periods of constipation and muscle pain.    She currently lives in a mobile home with her ex and her grandchild. Ambulates with a walker if walking outside, but does not need assistive devices in the mobile home as it is narrow and she can grasp her surroundings. She does not have much support at home.     Hospital Course  * Acute on chronic combined systolic and diastolic heart failure  Last EF 25%. Grade II DD  Clinically moderately volume overloaded   She is on her home O2, suspect quick turnaround, BNP likely elevated in setting of home Entresto    Cardiology consulted and assisted with diuretic management  - Diuresis with Lasix 80 mg IV QD--> 80 mg BID --> 80 mg TID - oral 80 mg TID--> BID  - Continue GDMT   - Not a great candidate for aldactone given Cr near 2.5   - she was started on hydralazine/isosorbide mononitrate and escalated as tolerated  F/u Dr. Javier- has appoint ment next week. heart/ kidney transplant eval would be done as outpatient    Acute on chronic respiratory failure with hypoxia  Home O2- 2 L's, currently at baseline, however moderately volume overloaded with WASHINGTON likely 2/2 cardiorenal   - Diurese and trend kidney function   - presented w increased SB and work of breathing, volume overload, WASHINGTON and NSTEMI    Hypertensive cardiovascular-renal disease, stage 1-4 or unspecified chronic kidney disease, with heart failure  See above      WASHINGTON (acute kidney injury)  Baseline Creatinine: 1.8-2.1  - Creatinine on admission: 2.4  - Urine lytes (Urine Na, Urine Cr, Urine Urea if on diuretic) and UA ordered   - Consider US of the retroperitoneum  - PRN Bladder Scans  ordered    11/16- cr 2.4-> 2.1 after IV lasix  Kidney transplant to be offered only if patient qualifies for cardiac transplant    Type 2 diabetes mellitus without complication, without long-term current use of insulin  - Home- Jardiance and Glimepiride  - LDSSI  - POCT checks ordered   Recent Labs     11/17/22  1610 11/17/22  1932 11/18/22  0708 11/18/22  1108 11/18/22  1559 11/18/22  1936   POCTGLUCOSE 161* 177* 148* 162* 170* 190*       elevated bilirubin d/t Gilbert's syndrome  - Monitor CMP       ICD (implantable cardioverter-defibrillator) in place  - Tele       Depression due to physical illness  Home Med- Cymbalta for chronic medical illnesses, not compliant at present       Paroxysmal atrial fibrillation  - Continue amio  - Continue Toprol   - Increase Apixaban to correct dose (was on 2.5 mg BID, unclear why as she only meets 1/3 criteria for dose reduction)- may be worth investigating further, denies any GI bleeding but rather occasional bruising   11/16- HR stable      MELISSA (obstructive sleep apnea)  - CPAP ordered     Essential hypertension  Continue home meds     Elevated troponin  - Likely elevated in setting of CKD- repeat and trend to peak   - 0.7 x 2, consult Cardiology      Procedures: none    Consultants: cardiology and kidney transplant, nephrology    Discharge Medication List as of 11/22/2022 12:54 PM        START taking these medications    Details   isosorbide-hydrALAZINE 20-37.5 mg (BIDIL) 20-37.5 mg Tab Take 2 tablets by mouth 3 (three) times daily., Starting Tue 11/22/2022, Until Wed 11/22/2023, Normal      potassium chloride (MICRO-K) 10 MEQ CpSR Take 1 capsule (10 mEq total) by mouth once daily., Starting Tue 11/22/2022, Normal           CONTINUE these medications which have CHANGED    Details   furosemide (LASIX) 80 MG tablet Take 1 tablet (80 mg total) by mouth 2 (two) times a day. Use afternoon dose if needed, Starting Tue 11/22/2022, Until Wed 11/22/2023, No Print           CONTINUE  these medications which have NOT CHANGED    Details   albuterol-ipratropium (DUO-NEB) 2.5 mg-0.5 mg/3 mL nebulizer solution Take 3 mLs by nebulization 2 (two) times a day., Starting Fri 9/16/2022, Normal      allopurinoL (ZYLOPRIM) 100 MG tablet Take 1 tablet (100 mg total) by mouth once daily., Starting Thu 11/3/2022, Normal      ALPRAZolam (XANAX) 2 MG Tab Take 2 mg by mouth 2 (two) times daily as needed., Starting Mon 9/27/2021, Historical Med      amiodarone (PACERONE) 200 MG Tab Take 1 tablet (200 mg total) by mouth once daily., Starting Sat 11/12/2022, Normal      apixaban (ELIQUIS) 2.5 mg Tab Take 1 tablet (2.5 mg total) by mouth 2 (two) times a day. Decrease in dose, Starting Thu 11/3/2022, Normal      atorvastatin (LIPITOR) 40 MG tablet Take 1 tablet (40 mg total) by mouth every evening., Starting Wed 10/12/2022, Until Thu 10/12/2023, Normal      b complex vitamins tablet Take 1 tablet by mouth once daily., Historical Med      blood sugar diagnostic (ACCU-CHEK GUIDE TEST STRIPS) Strp 1 strip by Other route 3 (three) times daily., Starting Fri 3/25/2022, Normal      diclofenac sodium (VOLTAREN) 1 % Gel APPLY 2 G TOPICALLY 3 (THREE) TIMES DAILY AS NEEDED (PAIN)., Starting Fri 8/26/2022, Normal      empagliflozin (JARDIANCE) 25 mg tablet Jardiance 25 mg tablet, Historical Med      ergocalciferol (ERGOCALCIFEROL) 50,000 unit Cap Take 1 capsule (50,000 Units total) by mouth every 7 days., Starting Wed 10/12/2022, Normal      ferrous sulfate 325 (65 FE) MG EC tablet Take 1 tablet (325 mg total) by mouth once daily., Starting Thu 5/20/2021, Normal      fluticasone propionate (FLONASE) 50 mcg/actuation nasal spray 2 sprays by Each Nostril route daily as needed for Rhinitis. , Historical Med      glimepiride (AMARYL) 2 MG tablet TAKE 1 TABLET BY MOUTH EVERY DAY WITH BREAKFAST, Normal      lancets (ACCU-CHEK SOFTCLIX LANCETS) Misc 1 Device by Misc.(Non-Drug; Combo Route) route 3 (three) times daily., Starting Fri  3/25/2022, Normal      LIDOcaine (LIDODERM) 5 % Place 1 patch onto the skin once daily. Remove & Discard patch within 12 hours or as directed by MD, Starting Wed 10/12/2022, Normal      linaCLOtide (LINZESS) 145 mcg Cap capsule Take 1 capsule (145 mcg total) by mouth before breakfast. Hold if diarrhea, Starting Tue 2/1/2022, Normal      metoprolol succinate (TOPROL-XL) 100 MG 24 hr tablet Take 100 mg by mouth once daily., Historical Med      mometasone-formoterol (DULERA) 200-5 mcg/actuation inhaler Inhale 2 puffs into the lungs 2 (two) times daily. Controller, Starting Wed 2/16/2022, Until Thu 2/16/2023, Normal      montelukast (SINGULAIR) 10 mg tablet Take 1 tablet every day by oral route for 30 days., Historical Med      NITROSTAT 0.4 mg SL tablet Take 2.5 tablets (1 mg total) by mouth every 5 (five) minutes as needed for Chest pain. No more than 3 tablets in 15 minutes., Starting Thu 5/10/2018, No Print      pantoprazole (PROTONIX) 40 MG tablet TAKE 1 TABLET BY MOUTH DAILY IN THE MORNING, Normal      polyethylene glycol (GLYCOLAX) 17 gram PwPk Take 17 g by mouth 3 (three) times daily as needed (constipation/hard stools)., Starting Tue 1/4/2022, Normal      rOPINIRole (REQUIP) 4 MG tablet Take 1 tablet (4 mg total) by mouth once daily. Pt taking 4mg daily, Starting Tue 1/4/2022, Normal      sacubitriL-valsartan (ENTRESTO)  mg per tablet Take 1 tablet by mouth 2 (two) times daily., Starting Tue 8/23/2022, Normal      SPIRIVA WITH HANDIHALER 18 mcg inhalation capsule INHALE 1 CAPSULE INTO THE LUNGS ONCE DAILY., Normal      VENTOLIN HFA 90 mcg/actuation inhaler Inhale 2 puffs into the lungs 2 (two) times daily as needed. , Starting Wed 3/1/2017, Historical Med      ondansetron (ZOFRAN-ODT) 8 MG TbDL Take 8 mg by mouth every 8 (eight) hours as needed., Starting Wed 5/11/2022, Historical Med           STOP taking these medications       blood-glucose meter kit Comments:   Reason for Stopping:         diltiazem  HCl (DILTIAZEM 2% CREAM) Comments:   Reason for Stopping:         hydrocortisone 2.5 % cream Comments:   Reason for Stopping:         nystatin (MYCOSTATIN) powder Comments:   Reason for Stopping:         olopatadine (PATADAY) 0.2 % Drop Comments:   Reason for Stopping:         promethazine-codeine 6.25-10 mg/5 ml (PHENERGAN WITH CODEINE) 6.25-10 mg/5 mL syrup Comments:   Reason for Stopping:         scopolamine (TRANSDERM-SCOP) 1.3-1.5 mg (1 mg over 3 days) Comments:   Reason for Stopping:         walker Misc Comments:   Reason for Stopping:               Discharge Diet:2 gram sodium diet     Activity: activity as tolerated and Activity as tolerated    Discharge Condition: Good    Disposition: Home or Self Care    Tests pending at the time of discharge: none      Time spent  on the discharge of the patient including review of hospital course with the patient. reviewing discharge medications and arranging follow-up care 35 min    Discharge examination Patient was seen and examined on the date of discharge and determined to be suitable for discharge.    Discharge plan     Future Appointments   Date Time Provider Department Center   12/8/2022  1:30 PM Cyndee Mattson MD [facility] PULM [place] ACT   12/14/2022  1:30 PM Cristobal Ann MD [facility]   12/15/2022 11:20 AM LAB, [facility] STA LAB [facility]   12/16/2022  2:00 PM Stoney Guardado MD [facility] HEARTTX [facility]   1/26/2023 10:00 AM HOME MONITOR DEVICE CHECK, [facility] ARRHPRO [facility]   2/24/2023  9:30 AM [facility]       Karyn Parrish MD

## 2022-12-08 ENCOUNTER — PATIENT OUTREACH (OUTPATIENT)
Dept: ADMINISTRATIVE | Facility: CLINIC | Age: 57
End: 2022-12-08
Payer: MEDICAID

## 2022-12-08 PROBLEM — J39.9 UPPER RESPIRATORY DISEASE: Status: RESOLVED | Noted: 2021-02-23 | Resolved: 2022-12-08

## 2022-12-08 NOTE — PROGRESS NOTES
"  C3 nurse contacted Hafsa Hawley regarding a VM that the patient left to the TCM voicemail box. The patient expressed concerns of having medication dosage adjustment side effects including, "no energy, always tired, no appetite, and weakness," s/p addressing these concerns with her PCP during her HOSFU appointment but without resolution. The patient was offered triage services upon phone call, however, she denied services at this time. Triage number was provided for worsening symptoms and the patient was then advised to contact her Cardiologist as this physician is the ordering provider of the medications the patient believes to be having a side effect from. Patient once again instructed to contact triage nurse line if unable to resolve questions s/p contacting her Cardiologist's staff.     Patient denied current SOB and did not present to have any noticeable trouble breathing prior to disconnecting the line.   "

## 2022-12-15 ENCOUNTER — TELEPHONE (OUTPATIENT)
Dept: TRANSPLANT | Facility: CLINIC | Age: 57
End: 2022-12-15
Payer: MEDICAID

## 2022-12-15 ENCOUNTER — LAB VISIT (OUTPATIENT)
Dept: LAB | Facility: HOSPITAL | Age: 57
End: 2022-12-15
Attending: INTERNAL MEDICINE
Payer: MEDICAID

## 2022-12-15 DIAGNOSIS — I42.8 NICM (NONISCHEMIC CARDIOMYOPATHY): ICD-10-CM

## 2022-12-15 LAB
ALBUMIN SERPL BCP-MCNC: 3.7 G/DL (ref 3.5–5.2)
ALP SERPL-CCNC: 45 U/L (ref 55–135)
ALT SERPL W/O P-5'-P-CCNC: 22 U/L (ref 10–44)
ANION GAP SERPL CALC-SCNC: 8 MMOL/L (ref 8–16)
AST SERPL-CCNC: 24 U/L (ref 10–40)
BILIRUB SERPL-MCNC: 1.1 MG/DL (ref 0.1–1)
BUN SERPL-MCNC: 36 MG/DL (ref 6–20)
CALCIUM SERPL-MCNC: 8.7 MG/DL (ref 8.7–10.5)
CHLORIDE SERPL-SCNC: 111 MMOL/L (ref 95–110)
CO2 SERPL-SCNC: 24 MMOL/L (ref 23–29)
CREAT SERPL-MCNC: 2.2 MG/DL (ref 0.5–1.4)
EST. GFR  (NO RACE VARIABLE): 26 ML/MIN/1.73 M^2
GLUCOSE SERPL-MCNC: 73 MG/DL (ref 70–110)
POTASSIUM SERPL-SCNC: 4.5 MMOL/L (ref 3.5–5.1)
PROT SERPL-MCNC: 6.3 G/DL (ref 6–8.4)
SODIUM SERPL-SCNC: 143 MMOL/L (ref 136–145)

## 2022-12-15 PROCEDURE — 80053 COMPREHEN METABOLIC PANEL: CPT | Performed by: INTERNAL MEDICINE

## 2022-12-15 PROCEDURE — 83880 ASSAY OF NATRIURETIC PEPTIDE: CPT | Performed by: INTERNAL MEDICINE

## 2022-12-15 PROCEDURE — 36415 COLL VENOUS BLD VENIPUNCTURE: CPT | Performed by: INTERNAL MEDICINE

## 2022-12-16 ENCOUNTER — OFFICE VISIT (OUTPATIENT)
Dept: TRANSPLANT | Facility: CLINIC | Age: 57
End: 2022-12-16
Payer: MEDICAID

## 2022-12-16 ENCOUNTER — DOCUMENTATION ONLY (OUTPATIENT)
Dept: TRANSPLANT | Facility: CLINIC | Age: 57
End: 2022-12-16

## 2022-12-16 VITALS
BODY MASS INDEX: 34.05 KG/M2 | SYSTOLIC BLOOD PRESSURE: 157 MMHG | HEIGHT: 66 IN | DIASTOLIC BLOOD PRESSURE: 90 MMHG | HEART RATE: 90 BPM | WEIGHT: 211.88 LBS

## 2022-12-16 DIAGNOSIS — I50.42 CHRONIC COMBINED SYSTOLIC AND DIASTOLIC HEART FAILURE: Primary | ICD-10-CM

## 2022-12-16 DIAGNOSIS — I10 ESSENTIAL HYPERTENSION: Chronic | ICD-10-CM

## 2022-12-16 DIAGNOSIS — E11.9 TYPE 2 DIABETES MELLITUS WITHOUT COMPLICATION, WITHOUT LONG-TERM CURRENT USE OF INSULIN: ICD-10-CM

## 2022-12-16 DIAGNOSIS — Z95.810 ICD (IMPLANTABLE CARDIOVERTER-DEFIBRILLATOR) IN PLACE: Chronic | ICD-10-CM

## 2022-12-16 DIAGNOSIS — N18.4 CKD (CHRONIC KIDNEY DISEASE), STAGE IV: ICD-10-CM

## 2022-12-16 DIAGNOSIS — I42.8 NICM (NONISCHEMIC CARDIOMYOPATHY): Chronic | ICD-10-CM

## 2022-12-16 DIAGNOSIS — I48.0 PAROXYSMAL ATRIAL FIBRILLATION: Chronic | ICD-10-CM

## 2022-12-16 PROCEDURE — 3008F PR BODY MASS INDEX (BMI) DOCUMENTED: ICD-10-PCS | Mod: CPTII,TXP,, | Performed by: INTERNAL MEDICINE

## 2022-12-16 PROCEDURE — 3044F PR MOST RECENT HEMOGLOBIN A1C LEVEL <7.0%: ICD-10-PCS | Mod: CPTII,TXP,, | Performed by: INTERNAL MEDICINE

## 2022-12-16 PROCEDURE — 99999 PR PBB SHADOW E&M-EST. PATIENT-LVL III: CPT | Mod: PBBFAC,TXP,, | Performed by: INTERNAL MEDICINE

## 2022-12-16 PROCEDURE — 3080F DIAST BP >= 90 MM HG: CPT | Mod: CPTII,TXP,, | Performed by: INTERNAL MEDICINE

## 2022-12-16 PROCEDURE — 3044F HG A1C LEVEL LT 7.0%: CPT | Mod: CPTII,TXP,, | Performed by: INTERNAL MEDICINE

## 2022-12-16 PROCEDURE — 3008F BODY MASS INDEX DOCD: CPT | Mod: CPTII,TXP,, | Performed by: INTERNAL MEDICINE

## 2022-12-16 PROCEDURE — 3077F SYST BP >= 140 MM HG: CPT | Mod: CPTII,TXP,, | Performed by: INTERNAL MEDICINE

## 2022-12-16 PROCEDURE — 4010F ACE/ARB THERAPY RXD/TAKEN: CPT | Mod: CPTII,TXP,, | Performed by: INTERNAL MEDICINE

## 2022-12-16 PROCEDURE — 3077F PR MOST RECENT SYSTOLIC BLOOD PRESSURE >= 140 MM HG: ICD-10-PCS | Mod: CPTII,TXP,, | Performed by: INTERNAL MEDICINE

## 2022-12-16 PROCEDURE — 3066F PR DOCUMENTATION OF TREATMENT FOR NEPHROPATHY: ICD-10-PCS | Mod: CPTII,TXP,, | Performed by: INTERNAL MEDICINE

## 2022-12-16 PROCEDURE — 1111F PR DISCHARGE MEDS RECONCILED W/ CURRENT OUTPATIENT MED LIST: ICD-10-PCS | Mod: CPTII,TXP,, | Performed by: INTERNAL MEDICINE

## 2022-12-16 PROCEDURE — 99215 OFFICE O/P EST HI 40 MIN: CPT | Mod: S$PBB,TXP,, | Performed by: INTERNAL MEDICINE

## 2022-12-16 PROCEDURE — 99215 PR OFFICE/OUTPT VISIT, EST, LEVL V, 40-54 MIN: ICD-10-PCS | Mod: S$PBB,TXP,, | Performed by: INTERNAL MEDICINE

## 2022-12-16 PROCEDURE — 1111F DSCHRG MED/CURRENT MED MERGE: CPT | Mod: CPTII,TXP,, | Performed by: INTERNAL MEDICINE

## 2022-12-16 PROCEDURE — 99999 PR PBB SHADOW E&M-EST. PATIENT-LVL III: ICD-10-PCS | Mod: PBBFAC,TXP,, | Performed by: INTERNAL MEDICINE

## 2022-12-16 PROCEDURE — 99213 OFFICE O/P EST LOW 20 MIN: CPT | Mod: PBBFAC,TXP | Performed by: INTERNAL MEDICINE

## 2022-12-16 PROCEDURE — 4010F PR ACE/ARB THEARPY RXD/TAKEN: ICD-10-PCS | Mod: CPTII,TXP,, | Performed by: INTERNAL MEDICINE

## 2022-12-16 PROCEDURE — 3080F PR MOST RECENT DIASTOLIC BLOOD PRESSURE >= 90 MM HG: ICD-10-PCS | Mod: CPTII,TXP,, | Performed by: INTERNAL MEDICINE

## 2022-12-16 PROCEDURE — 3066F NEPHROPATHY DOC TX: CPT | Mod: CPTII,TXP,, | Performed by: INTERNAL MEDICINE

## 2022-12-16 RX ORDER — HYDRALAZINE HYDROCHLORIDE 100 MG/1
100 TABLET, FILM COATED ORAL 3 TIMES DAILY
Qty: 90 TABLET | Refills: 11 | Status: ON HOLD | OUTPATIENT
Start: 2022-12-16 | End: 2023-02-03 | Stop reason: HOSPADM

## 2022-12-16 RX ORDER — ISOSORBIDE DINITRATE 30 MG/1
30 TABLET ORAL 3 TIMES DAILY
Qty: 90 TABLET | Refills: 11 | Status: ON HOLD | OUTPATIENT
Start: 2022-12-16 | End: 2023-05-15 | Stop reason: HOSPADM

## 2022-12-16 NOTE — PATIENT INSTRUCTIONS
Stop taking the Bidil  Start taking the Hydralazine and Isosorbide dinitrate as prescribed  Increase your Eliquis back to 5 mg twice a da  Stop your Eliquis 2 days before the right heart cath  Increase your lasix back to the prescribed dose of 80 mg twice a day

## 2022-12-16 NOTE — PROGRESS NOTES
12/16/2022   Hafsa Hawley  [address] [place] [address]        OUTPATIENT RIGHT HEART CATHETERIZATION INSTRUCTIONS     You have been scheduled for a procedure in the catheterization lab on 12/19/22       Please report to the Cardiology Waiting Area on the Third floor of the hospital and check in at 3:00 pm    You will then be taken to the SSCU (Short Stay Cardiac Unit) and prepared for your procedure. Please be aware that this is not the time of your procedure but the time you are to arrive. The procedures are scheduled on an hourly basis; however, emergency cases take precedence over all other cases.      You may eat a light meal before your procedure.    You may take your regular morning medications with water. If there are any medications that you should not take you will be instructed to hold them that morning.   If you are on Pradaxa, Eliquis or Coumadin , you can hold them 3 days prior to your procedure.  CALL US if you are on blood thinners for instructions. [phone]  Do not stop your Aspirin, Plavix, Effient, or Brilinta.    The procedure will take 30 minutes to perform. After the procedure, you will return to SSCU on the third floor of the hospital. Your family may remain in the room with you during this time.     You will be monitored for bleeding from the puncture site.  Your dressing may be removed the next day and dressed with a Band-Aid.  You may be able to be discharged home that same afternoon if there is someone to drive you home and there were no complications.     You may need to be admitted to the hospital after your procedure, so pack an overnight bag. Your doctor will determine, based on your progress, the date and time of your discharge. The results of your procedure will be discussed with you before you are discharged. Any further testing or procedures will be scheduled for you either before you leave or you will be called with these appointments.      If you should have any questions,  concerns, or need to change the date of your procedure, please call  Heart Transplant office and ask for your nurse. [phone]    Special Instructions:     THE ABOVE INSTRUCTIONS WERE GIVEN TO THE PATIENT VERBALLY AND THEY VERBALIZED UNDERSTANDING.  THEY DO NOT REQUIRE ANY SPECIAL NEEDS AND DO NOT HAVE ANY LEARNING BARRIERS.     Directions for Reporting to Cardiology Waiting Area in the Hospital  If you park in the Parking Garage:  Take elevators to the1st floor of the parking garage.  Continue past the gift shop, coffee shop, and piano.  Take a right and go to the gold elevators. (Elevator B)  Take the elevator to the 3rd floor.  Follow the arrow on the sign on the wall that says Cath Lab Registration/EP Lab Registration.  Follow the long hallway all the way around until you come to a big open area.  This is the registration area.  Check in at Reception Desk.     OR     If family is dropping you off:  Have them drop you off at the front of the Hospital under the green overhang.  Enter through the doors and take a right.  Take the E elevators to the 3rd floor Cardiology Waiting Area.  Check in at the Reception Desk in the waiting room.

## 2022-12-16 NOTE — H&P (VIEW-ONLY)
Subjective:   Initial evaluation of heart transplant candidacy.    HPI:  Ms. Hawley is a 57 y.o. year old Black or  female who has presents to be considered for advanced surgical options (LVAD/OHT).      58 YO F who is well known to our team. She has a PMH of NICM, HFrEF, LVEF 10%, LVEDD 7.3 cm, s/p ICD, pulmonary hypertension, DM, HTN, permanent AF who presents for consideration for advanced therapies.    She was previously evaluated by our team and presented to committee on 3/9/22. Per the noted, at the time she was declined for OHT or LVAD due to renal function, lung function and difficulty consistently following up with the team.    She has subsequently been seen by cardiology as well as her PCP and pulmonology. Her most recent pulmonology note is from 1 week prior. Based on their interpretation of workup thus far there was no overt evidence of ILD or COPD on PFTs and CT chest so it was unclear as to the reason for the chronic hypoxic and hypercapnic respiratory failure.    She has had 2 admissions for decompensated CHF in the past 2 months, most recent of which was 11/15/22 to 11/22/22.    She presents today for follow-up.  She says that since her discharge overall she has been doing okay.  She still has a persistent cough productive of green sputum.  Denies, fevers, chills, rigors.    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  She says that at present she is not very active at home, but is independent in her activities of daily living and work around the house.  She tries to go for short walks for exercise.  Denies any chest discomfort with exertion.  Does note occasional shortness of breath with exertion.  Denies any leg swelling, abdominal swelling, palpitations, presyncope, syncope, recent shocks from her device.  Does note occasional PND and orthopnea.    Of note, she is still a little confused about some of her medications.  Her Eliquis is 2.5 mg b.i.d..  It is  unclear as to the reason that this was dropped as she met only 1/3 of the criteria for dose reduction.  Her most recent discharge summary mentions that she was supposed to increase back to 5 mg twice a day.  Also of note she is on Lasix 80 mg twice a day, but she says that she is been taking Love and 40 occasionally in the afternoon when she feels volume up although she is again unsure of this or the dose.    Denies any alcohol use, recreational drug use, or tobacco use.    She lives at home with her ex  and granddaughter who are her primary support system.    Past Medical History:   Diagnosis Date    Anemia     Anticoagulant long-term use     Arthritis     Atrial fibrillation     CKD (chronic kidney disease), stage IV 5/8/2018    Congestive heart failure     COPD (chronic obstructive pulmonary disease)     Deep vein thrombosis     elevated bilirubin d/t Gilbert's syndrome     confirmed by Protection genetic testing, evaluated by hepatology    Encounter for blood transfusion     GERD (gastroesophageal reflux disease)     Hypertension     Hypertensive cardiovascular-renal disease, stage 1-4 or unspecified chronic kidney disease, with heart failure 3/4/2022    Pheochromocytoma, malignant     Restrictive lung disease 4/26/2022    Right kidney mass     Sleep apnea     Thalassemia trait, alpha     Thyroid disease     Type 2 diabetes mellitus with hyperglycemia, without long-term current use of insulin 8/13/2020     Past Surgical History:   Procedure Laterality Date    APPENDECTOMY      BONE MARROW BIOPSY      CARDIAC DEFIBRILLATOR PLACEMENT Left 12/2016    CARDIAC ELECTROPHYSIOLOGY MAPPING AND ABLATION      CARDIAC ELECTROPHYSIOLOGY MAPPING AND ABLATION      COLONOSCOPY N/A 5/6/2022    Procedure: COLONOSCOPY;  Surgeon: Arely Betancourt MD;  Location: [facility] ([address] [place] [address]);  Service: Endoscopy;  Laterality: N/A;  heart transplant candidate/ EF 25% - 2nd floor/ defib - [facility] - SARAH Hughes - mechelle Cortez with [facility]  [place], Pt ok to hold Eliquis x 2 days prior-see media tab-outside correspondence dated 21  - ERW  verbal instructions/portal instructions/email instructions - s    EYE SURGERY      due to running tears    FRACTURE SURGERY Left     hand 5th digit    HYSTERECTOMY      KNEE SURGERY Left 2016    hematoma    LIVER BIOPSY  10/24/2018    Minimal steatosis, predominantly macrovesicular, 1%, Minimal nonspecific portal inflammation, no fibrosis. No findings on biopsy to explain elevated bilirubin levels. Could be d/t Gilbert's =?- hemolysis    RIGHT HEART CATHETERIZATION Right 2021    Procedure: INSERTION, CATHETER, RIGHT HEART;  Surgeon: Irving Cardenas MD;  Location: [facility] CATH [facility];  Service: Cardiology;  Laterality: Right;    TRANSJUGULAR BIOPSY OF LIVER N/A 10/24/2018    Procedure: BIOPSY, LIVER, TRANSJUGULAR APPROACH;  Surgeon: Carmen Diagnostic Provider;  Location: [facility] OR [address] [place] [address];  Service: Radiology;  Laterality: N/A;       Family History   Problem Relation Age of Onset    Cancer Mother         pancreatic CA early 50's    Heart disease Father          MI in late 50's    Hypertension Father     Heart attack Father     Heart disease Sister     Heart disease Brother     Cirrhosis Brother         alcoholic    Heart disease Sister     Heart disease Brother     Hypertension Brother     Diabetes Brother        Review of Systems   Constitutional: Negative for chills and fever.   HENT:  Negative for hearing loss.    Eyes:  Negative for visual disturbance.   Cardiovascular:  Positive for dyspnea on exertion, orthopnea and paroxysmal nocturnal dyspnea. Negative for chest pain, irregular heartbeat, leg swelling, palpitations and syncope.   Respiratory:  Positive for cough and shortness of breath.    Musculoskeletal:  Negative for muscle weakness.   Gastrointestinal:  Negative for diarrhea, nausea and vomiting.   Neurological:  Negative for focal weakness.   Psychiatric/Behavioral:  Negative for memory loss.   "    Objective:   Blood pressure (!) 157/90, pulse 90, height 5' 6" (1.676 m), weight 96.1 kg (211 lb 13.8 oz).body mass index is 34.2 kg/m².    Physical Exam  Constitutional:       General: She is not in acute distress.     Appearance: Normal appearance.   HENT:      Head: Normocephalic and atraumatic.   Eyes:      Extraocular Movements: Extraocular movements intact.   Cardiovascular:      Rate and Rhythm: Normal rate and regular rhythm.      Pulses: Normal pulses.      Heart sounds: Normal heart sounds.      Comments: JVP not visible at 30 degrees. Prominent arterial pulsations.  Pulmonary:      Breath sounds: Normal breath sounds.   Abdominal:      Palpations: Abdomen is soft.      Tenderness: There is no abdominal tenderness.   Musculoskeletal:      Right lower leg: No edema.      Left lower leg: No edema.   Neurological:      General: No focal deficit present.      Mental Status: She is alert and oriented to person, place, and time.       Labs:      Chemistry        Component Value Date/Time     12/15/2022 1155    K 4.5 12/15/2022 1155     (H) 12/15/2022 1155    CO2 24 12/15/2022 1155    BUN 36 (H) 12/15/2022 1155    CREATININE 2.2 (H) 12/15/2022 1155    GLU 73 12/15/2022 1155        Component Value Date/Time    CALCIUM 8.7 12/15/2022 1155    ALKPHOS 45 (L) 12/15/2022 1155    AST 24 12/15/2022 1155    ALT 22 12/15/2022 1155    BILITOT 1.1 (H) 12/15/2022 1155    ESTGFRAFRICA 29.7 (A) 04/26/2022 1305    EGFRNONAA 25.7 (A) 04/26/2022 1305          Magnesium   Date Value Ref Range Status   11/22/2022 2.0 1.6 - 2.6 mg/dL Final     Lab Results   Component Value Date    WBC 7.45 11/18/2022    HGB 7.9 (L) 11/18/2022    HCT 27.2 (L) 11/18/2022    MCV 61 (L) 11/18/2022     11/18/2022     BNP   Date Value Ref Range Status   11/15/2022 4,104 (H) 0 - 99 pg/mL Final     Comment:     Values of less than 100 pg/ml are consistent with non-CHF populations.   11/02/2022 264 (H) 0 - 99 pg/mL Final     Comment:    "  Values of less than 100 pg/ml are consistent with non-CHF populations.   10/28/2022 2,771 (H) 0 - 99 pg/mL Final     Comment:     Values of less than 100 pg/ml are consistent with non-CHF populations.     No results found for this or any previous visit.      Labs were reviewed with the patient.    Assessment:      1. Chronic combined systolic and diastolic heart failure    2. Essential hypertension    3. Paroxysmal atrial fibrillation    4. NICM (nonischemic cardiomyopathy)    5. ICD (implantable cardioverter-defibrillator) in place    6. CKD (chronic kidney disease), stage IV    7. Type 2 diabetes mellitus without complication, without long-term current use of insulin        Plan:     NICM  HFrEF, LVEF 10%  - Presents today for evaluation of advanced therapies  - Clinically appears euvolemic, NYHA class III symptoms although she is not very active so I suspect more symptoms with higher level of activity  - Current GDMT is metoprolol succinate 100 mg daily, Max dose entresto, jaridance. Bidil 37.5-20 TID. Not on MRA given CKD  - She says she took her meds this AM and is still quite hypertensive  - Will increase hydralazine to 100 mg TID and isordil to 30 mg TID  - Continue other meds unchanged  - Recommended that she take the lasix as prescribed which is 80 mg TID  - Previously evaluated for advanced therapies and declined in March this past year, per note for renal function, lung function and difficulty consistently following up with the team.  - Will repeat RHC and CPX and bring back to clinic in 1 month  - She has seen pulm since last visit and it appears they believe most of her pulmonary issues are secondary to CHF and no evidence of ILD or COPD  - CKD remains an issue, Cr is stable on today's labs  - My other concern is the level of her PH seen on prior RHC. Will need to assess the PA pressures and reversibility with RHC    AF  - On lower dose eliquis still, unclear reasons  - Advised to increase to 5 mg  BID    SCD  - ICD in place, no recent shocks    Patient is now NYHA III ACC stage D  Recommend 2 gram sodium restriction and 1500cc fluid restriction.  Encourage physical activity with graded exercise program.  Requested patient to weigh themselves daily, and to notify us if their weight increases by more than 3 lbs in 1 day or 5 lbs in 1 week.     Transplant Candidacy: Patient is a 57 y.o. year old female with heart failure is being seen for possible LVAD and OHT. In my opinion, she is   possibly  LVAD and OHT candidate. Concern for LVAD would be her CKD, and concern for OHT would be her pulmonary disease, PH, and level of support. Patient did meet with [facility] and/or pre-transplant coordinator at the end of this visit for workup. she is scheduled for risk stratification testing with CPX/RHC. The patient will follow up with pre-transplant.    D[facility] with the patient and [facility] outcomes as reported in INTERMACS (Interagency Registry for Mechanically Assisted Circulatory Support):    1 year survival = 92.7%  2 year survival = 86.7%  3 year survival = 86.7%    Patient and family acknowledged receipt of this information and all questions were answered. UNOS Patient Status  Functional Status: 70% - Cares for self: unable to carry on normal activity or active work  Physical Capacity: Limited Mobility  Working for Income: No  If no, reason not working: Disability    Stoney Guardado MD

## 2022-12-16 NOTE — PROGRESS NOTES
Subjective:   Initial evaluation of heart transplant candidacy.    HPI:  Ms. Hawley is a 57 y.o. year old Black or  female who has presents to be considered for advanced surgical options (LVAD/OHT).      58 YO F who is well known to our team. She has a PMH of NICM, HFrEF, LVEF 10%, LVEDD 7.3 cm, s/p ICD, pulmonary hypertension, DM, HTN, permanent AF who presents for consideration for advanced therapies.    She was previously evaluated by our team and presented to committee on 3/9/22. Per the noted, at the time she was declined for OHT or LVAD due to renal function, lung function and difficulty consistently following up with the team.    She has subsequently been seen by cardiology as well as her PCP and pulmonology. Her most recent pulmonology note is from 1 week prior. Based on their interpretation of workup thus far there was no overt evidence of ILD or COPD on PFTs and CT chest so it was unclear as to the reason for the chronic hypoxic and hypercapnic respiratory failure.    She has had 2 admissions for decompensated CHF in the past 2 months, most recent of which was 11/15/22 to 11/22/22.    She presents today for follow-up.  She says that since her discharge overall she has been doing okay.  She still has a persistent cough productive of green sputum.  Denies, fevers, chills, rigors.    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  She says that at present she is not very active at home, but is independent in her activities of daily living and work around the house.  She tries to go for short walks for exercise.  Denies any chest discomfort with exertion.  Does note occasional shortness of breath with exertion.  Denies any leg swelling, abdominal swelling, palpitations, presyncope, syncope, recent shocks from her device.  Does note occasional PND and orthopnea.    Of note, she is still a little confused about some of her medications.  Her Eliquis is 2.5 mg b.i.d..  It is  unclear as to the reason that this was dropped as she met only 1/3 of the criteria for dose reduction.  Her most recent discharge summary mentions that she was supposed to increase back to 5 mg twice a day.  Also of note she is on Lasix 80 mg twice a day, but she says that she is been taking Love and 40 occasionally in the afternoon when she feels volume up although she is again unsure of this or the dose.    Denies any alcohol use, recreational drug use, or tobacco use.    She lives at home with her ex  and granddaughter who are her primary support system.    Past Medical History:   Diagnosis Date    Anemia     Anticoagulant long-term use     Arthritis     Atrial fibrillation     CKD (chronic kidney disease), stage IV 5/8/2018    Congestive heart failure     COPD (chronic obstructive pulmonary disease)     Deep vein thrombosis     elevated bilirubin d/t Gilbert's syndrome     confirmed by [place] genetic testing, evaluated by hepatology    Encounter for blood transfusion     GERD (gastroesophageal reflux disease)     Hypertension     Hypertensive cardiovascular-renal disease, stage 1-4 or unspecified chronic kidney disease, with heart failure 3/4/2022    Pheochromocytoma, malignant     Restrictive lung disease 4/26/2022    Right kidney mass     Sleep apnea     Thalassemia trait, alpha     Thyroid disease     Type 2 diabetes mellitus with hyperglycemia, without long-term current use of insulin 8/13/2020     Past Surgical History:   Procedure Laterality Date    APPENDECTOMY      BONE MARROW BIOPSY      CARDIAC DEFIBRILLATOR PLACEMENT Left 12/2016    CARDIAC ELECTROPHYSIOLOGY MAPPING AND ABLATION      CARDIAC ELECTROPHYSIOLOGY MAPPING AND ABLATION      COLONOSCOPY N/A 5/6/2022    Procedure: COLONOSCOPY;  Surgeon: Arely Betancourt MD;  Location: [facility] ([address] [place] [address]);  Service: Endoscopy;  Laterality: N/A;  heart transplant candidate/ EF 25% - 2nd floor/ defib - [facility] - SARAH Hughes - mechelle Cortez with [facility]  [place], Pt ok to hold Eliquis x 2 days prior-see media tab-outside correspondence dated 21  - ERW  verbal instructions/portal instructions/email instructions - s    EYE SURGERY      due to running tears    FRACTURE SURGERY Left     hand 5th digit    HYSTERECTOMY      KNEE SURGERY Left 2016    hematoma    LIVER BIOPSY  10/24/2018    Minimal steatosis, predominantly macrovesicular, 1%, Minimal nonspecific portal inflammation, no fibrosis. No findings on biopsy to explain elevated bilirubin levels. Could be d/t Gilbert's =?- hemolysis    RIGHT HEART CATHETERIZATION Right 2021    Procedure: INSERTION, CATHETER, RIGHT HEART;  Surgeon: Irving Cardenas MD;  Location: [facility] CATH LAB;  Service: Cardiology;  Laterality: Right;    TRANSJUGULAR BIOPSY OF LIVER N/A 10/24/2018    Procedure: BIOPSY, LIVER, TRANSJUGULAR APPROACH;  Surgeon: Carmen Diagnostic Provider;  Location: [facility] OR [address] [place] [address];  Service: Radiology;  Laterality: N/A;       Family History   Problem Relation Age of Onset    Cancer Mother         pancreatic CA early 50's    Heart disease Father          MI in late 50's    Hypertension Father     Heart attack Father     Heart disease Sister     Heart disease Brother     Cirrhosis Brother         alcoholic    Heart disease Sister     Heart disease Brother     Hypertension Brother     Diabetes Brother        Review of Systems   Constitutional: Negative for chills and fever.   HENT:  Negative for hearing loss.    Eyes:  Negative for visual disturbance.   Cardiovascular:  Positive for dyspnea on exertion, orthopnea and paroxysmal nocturnal dyspnea. Negative for chest pain, irregular heartbeat, leg swelling, palpitations and syncope.   Respiratory:  Positive for cough and shortness of breath.    Musculoskeletal:  Negative for muscle weakness.   Gastrointestinal:  Negative for diarrhea, nausea and vomiting.   Neurological:  Negative for focal weakness.   Psychiatric/Behavioral:  Negative for memory loss.   "    Objective:   Blood pressure (!) 157/90, pulse 90, height 5' 6" (1.676 m), weight 96.1 kg (211 lb 13.8 oz).body mass index is 34.2 kg/m².    Physical Exam  Constitutional:       General: She is not in acute distress.     Appearance: Normal appearance.   HENT:      Head: Normocephalic and atraumatic.   Eyes:      Extraocular Movements: Extraocular movements intact.   Cardiovascular:      Rate and Rhythm: Normal rate and regular rhythm.      Pulses: Normal pulses.      Heart sounds: Normal heart sounds.      Comments: JVP not visible at 30 degrees. Prominent arterial pulsations.  Pulmonary:      Breath sounds: Normal breath sounds.   Abdominal:      Palpations: Abdomen is soft.      Tenderness: There is no abdominal tenderness.   Musculoskeletal:      Right lower leg: No edema.      Left lower leg: No edema.   Neurological:      General: No focal deficit present.      Mental Status: She is alert and oriented to person, place, and time.       Labs:      Chemistry        Component Value Date/Time     12/15/2022 1155    K 4.5 12/15/2022 1155     (H) 12/15/2022 1155    CO2 24 12/15/2022 1155    BUN 36 (H) 12/15/2022 1155    CREATININE 2.2 (H) 12/15/2022 1155    GLU 73 12/15/2022 1155        Component Value Date/Time    CALCIUM 8.7 12/15/2022 1155    ALKPHOS 45 (L) 12/15/2022 1155    AST 24 12/15/2022 1155    ALT 22 12/15/2022 1155    BILITOT 1.1 (H) 12/15/2022 1155    ESTGFRAFRICA 29.7 (A) 04/26/2022 1305    EGFRNONAA 25.7 (A) 04/26/2022 1305          Magnesium   Date Value Ref Range Status   11/22/2022 2.0 1.6 - 2.6 mg/dL Final     Lab Results   Component Value Date    WBC 7.45 11/18/2022    HGB 7.9 (L) 11/18/2022    HCT 27.2 (L) 11/18/2022    MCV 61 (L) 11/18/2022     11/18/2022     BNP   Date Value Ref Range Status   11/15/2022 4,104 (H) 0 - 99 pg/mL Final     Comment:     Values of less than 100 pg/ml are consistent with non-CHF populations.   11/02/2022 264 (H) 0 - 99 pg/mL Final     Comment:    "  Values of less than 100 pg/ml are consistent with non-CHF populations.   10/28/2022 2,771 (H) 0 - 99 pg/mL Final     Comment:     Values of less than 100 pg/ml are consistent with non-CHF populations.     No results found for this or any previous visit.      Labs were reviewed with the patient.    Assessment:      1. Chronic combined systolic and diastolic heart failure    2. Essential hypertension    3. Paroxysmal atrial fibrillation    4. NICM (nonischemic cardiomyopathy)    5. ICD (implantable cardioverter-defibrillator) in place    6. CKD (chronic kidney disease), stage IV    7. Type 2 diabetes mellitus without complication, without long-term current use of insulin        Plan:     NICM  HFrEF, LVEF 10%  - Presents today for evaluation of advanced therapies  - Clinically appears euvolemic, NYHA class III symptoms although she is not very active so I suspect more symptoms with higher level of activity  - Current GDMT is metoprolol succinate 100 mg daily, Max dose entresto, jaridance. Bidil 37.5-20 TID. Not on MRA given CKD  - She says she took her meds this AM and is still quite hypertensive  - Will increase hydralazine to 100 mg TID and isordil to 30 mg TID  - Continue other meds unchanged  - Recommended that she take the lasix as prescribed which is 80 mg TID  - Previously evaluated for advanced therapies and declined in March this past year, per note for renal function, lung function and difficulty consistently following up with the team.  - Will repeat RHC and CPX and bring back to clinic in 1 month  - She has seen pulm since last visit and it appears they believe most of her pulmonary issues are secondary to CHF and no evidence of ILD or COPD  - CKD remains an issue, Cr is stable on today's labs  - My other concern is the level of her PH seen on prior RHC. Will need to assess the PA pressures and reversibility with RHC    AF  - On lower dose eliquis still, unclear reasons  - Advised to increase to 5 mg  BID    SCD  - ICD in place, no recent shocks    Patient is now NYHA III ACC stage D  Recommend 2 gram sodium restriction and 1500cc fluid restriction.  Encourage physical activity with graded exercise program.  Requested patient to weigh themselves daily, and to notify us if their weight increases by more than 3 lbs in 1 day or 5 lbs in 1 week.     Transplant Candidacy: Patient is a 57 y.o. year old female with heart failure is being seen for possible LVAD and OHT. In my opinion, she is   possibly  LVAD and OHT candidate. Concern for LVAD would be her CKD, and concern for OHT would be her pulmonary disease, PH, and level of support. Patient did meet with [facility] and/or pre-transplant coordinator at the end of this visit for workup. she is scheduled for risk stratification testing with CPX/RHC. The patient will follow up with pre-transplant.    [facility] the patient and [facility] outcomes as reported in INTERMACS (Interagency Registry for Mechanically Assisted Circulatory Support):    1 year survival = 92.7%  2 year survival = 86.7%  3 year survival = 86.7%    Patient and family acknowledged receipt of this information and all questions were answered. UNOS Patient Status  Functional Status: 70% - Cares for self: unable to carry on normal activity or active work  Physical Capacity: Limited Mobility  Working for Income: No  If no, reason not working: Disability    Stoney Guardado MD

## 2022-12-16 NOTE — TELEPHONE ENCOUNTER
----- Message from Lourdes Oquendo sent at 2022  9:52 AM CST -----  Contact: self  Hafsa Hawley  MRN: [id number]  : 1965  PCP: Krysta Tony  Home Phone      [phone]  Work Phone      Not on file.  Mobile          [phone]  Home Phone      [phone]      MESSAGE:   Pt requesting refill or new Rx.   Is this a refill or new RX:  refill  RX name and strength:  ondansetron (ZOFRAN-ODT) 8 MG TbDL   ergocalciferol (ERGOCALCIFEROL) 50,000 unit Cap  Last office visit:   2022  Is this a 30-day or 90-day RX:    Pharmacy name and location:  [facility]  Comments:      Phone:  [phone]

## 2022-12-17 LAB — NT-PROBNP SERPL IA-MCNC: 6432 PG/ML

## 2022-12-19 ENCOUNTER — HOSPITAL ENCOUNTER (OUTPATIENT)
Facility: HOSPITAL | Age: 57
Discharge: HOME | End: 2022-12-19
Attending: INTERNAL MEDICINE | Admitting: INTERNAL MEDICINE
Payer: MEDICAID

## 2022-12-19 ENCOUNTER — HOSPITAL ENCOUNTER (OUTPATIENT)
Dept: CARDIOLOGY | Facility: HOSPITAL | Age: 57
Discharge: HOME | End: 2022-12-19
Attending: INTERNAL MEDICINE | Admitting: INTERNAL MEDICINE
Payer: MEDICAID

## 2022-12-19 ENCOUNTER — SOCIAL WORK (OUTPATIENT)
Dept: TRANSPLANT | Facility: CLINIC | Age: 57
End: 2022-12-19
Payer: MEDICAID

## 2022-12-19 VITALS
HEIGHT: 66 IN | BODY MASS INDEX: 33.75 KG/M2 | SYSTOLIC BLOOD PRESSURE: 128 MMHG | DIASTOLIC BLOOD PRESSURE: 69 MMHG | HEART RATE: 86 BPM | WEIGHT: 210 LBS

## 2022-12-19 VITALS
TEMPERATURE: 97 F | SYSTOLIC BLOOD PRESSURE: 138 MMHG | RESPIRATION RATE: 18 BRPM | OXYGEN SATURATION: 95 % | DIASTOLIC BLOOD PRESSURE: 70 MMHG | HEIGHT: 66 IN | HEART RATE: 93 BPM | BODY MASS INDEX: 33.57 KG/M2 | WEIGHT: 208.88 LBS

## 2022-12-19 DIAGNOSIS — I50.42 CHRONIC COMBINED SYSTOLIC AND DIASTOLIC HEART FAILURE: ICD-10-CM

## 2022-12-19 LAB
CV STRESS BASE HR: 86 BPM
DIASTOLIC BLOOD PRESSURE: 69 MMHG
OHS CV CPX 1 MINUTE RECOVERY HEART RATE: 107 BPM
OHS CV CPX 85 PERCENT MAX PREDICTED HEART RATE MALE: 132
OHS CV CPX DATA GRADE - PEAK: 1.9
OHS CV CPX DATA O2 SAT - PEAK: 88
OHS CV CPX DATA O2 SAT - REST: 90
OHS CV CPX DATA SPEED - PEAK: 1.5
OHS CV CPX DATA TIME - PEAK: 2.83
OHS CV CPX DATA VE/VCO2 - PEAK: 47
OHS CV CPX DATA VE/VO2 - PEAK: 41
OHS CV CPX DATA VO2 - PEAK: 10.1
OHS CV CPX DATA VO2 - REST: 5.2
OHS CV CPX FEV1/FVC: 0.7
OHS CV CPX FORCED EXPIRATORY VOLUME: 1.03
OHS CV CPX FORCED VITAL CAPACITY (FVC): 1.47
OHS CV CPX HIGHEST VO: 30.2
OHS CV CPX MAX PREDICTED HEART RATE: 156
OHS CV CPX MAXIMAL VOLUNTARY VENTILATION (MVV) PREDICTED: 41.2
OHS CV CPX MAXIMAL VOLUNTARY VENTILATION (MVV): 37
OHS CV CPX MAXIUMUM EXERCISE VENTILATION (VE MAX): 32.4
OHS CV CPX PATIENT AGE: 57
OHS CV CPX PATIENT HEIGHT IN: 66
OHS CV CPX PATIENT IS FEMALE AGE 11-19: 0
OHS CV CPX PATIENT IS FEMALE AGE GREATER THAN 19: 1
OHS CV CPX PATIENT IS FEMALE AGE LESS THAN 11: 0
OHS CV CPX PATIENT IS FEMALE: 1
OHS CV CPX PATIENT IS MALE AGE 11-25: 0
OHS CV CPX PATIENT IS MALE AGE GREATER THAN 25: 0
OHS CV CPX PATIENT IS MALE AGE LESS THAN 11: 0
OHS CV CPX PATIENT IS MALE GREATER THAN 18: 0
OHS CV CPX PATIENT IS MALE LESS THAN OR EQUAL TO 18: 0
OHS CV CPX PATIENT IS MALE: 0
OHS CV CPX PATIENT WEIGHT RETURNED IN OZ: 3360
OHS CV CPX PEAK DIASTOLIC BLOOD PRESSURE: 104 MMHG
OHS CV CPX PEAK HEAR RATE: 113 BPM
OHS CV CPX PEAK RATE PRESSURE PRODUCT: NORMAL
OHS CV CPX PEAK SYSTOLIC BLOOD PRESSURE: 150 MMHG
OHS CV CPX PERCENT BODY FAT: 19.7
OHS CV CPX PERCENT MAX PREDICTED HEART RATE ACHIEVED: 73
OHS CV CPX PREDICTED VO2: 30.2 ML/KG/MIN
OHS CV CPX RATE PRESSURE PRODUCT PRESENTING: NORMAL
OHS CV CPX REST PET CO2: 27
OHS CV CPX VE/VCO2 SLOPE: 48.1
STRESS ECHO POST EXERCISE DUR MIN: 2 MINUTES
STRESS ECHO POST EXERCISE DUR SEC: 50 SECONDS
SYSTOLIC BLOOD PRESSURE: 128 MMHG

## 2022-12-19 PROCEDURE — 94621 CARDIOPULM EXERCISE TESTING: CPT | Mod: 26,NTX,, | Performed by: INTERNAL MEDICINE

## 2022-12-19 PROCEDURE — C1751 CATH, INF, PER/CENT/MIDLINE: HCPCS | Mod: TXP | Performed by: INTERNAL MEDICINE

## 2022-12-19 PROCEDURE — 93451 PR RIGHT HEART CATH O2 SATURATION & CARDIAC OUTPUT: ICD-10-PCS | Mod: 26,NTX,, | Performed by: INTERNAL MEDICINE

## 2022-12-19 PROCEDURE — 93451 RIGHT HEART CATH: CPT | Mod: 26,NTX,, | Performed by: INTERNAL MEDICINE

## 2022-12-19 PROCEDURE — C1894 INTRO/SHEATH, NON-LASER: HCPCS | Mod: TXP | Performed by: INTERNAL MEDICINE

## 2022-12-19 PROCEDURE — 94621 CARDIOPULMONARY EXERCISE TESTING (CUPID ONLY): ICD-10-PCS | Mod: 26,NTX,, | Performed by: INTERNAL MEDICINE

## 2022-12-19 PROCEDURE — 94621 CARDIOPULM EXERCISE TESTING: CPT | Mod: TXP

## 2022-12-19 PROCEDURE — 93451 RIGHT HEART CATH: CPT | Mod: NTX | Performed by: INTERNAL MEDICINE

## 2022-12-19 PROCEDURE — 25000003 PHARM REV CODE 250: Mod: TXP | Performed by: INTERNAL MEDICINE

## 2022-12-19 RX ORDER — ONDANSETRON 4 MG/1
4 TABLET, ORALLY DISINTEGRATING ORAL 2 TIMES DAILY
Qty: 20 TABLET | Refills: 0 | Status: SHIPPED | OUTPATIENT
Start: 2022-12-19 | End: 2022-12-30 | Stop reason: SDUPTHER

## 2022-12-19 RX ORDER — ERGOCALCIFEROL 1.25 MG/1
50000 CAPSULE ORAL
Qty: 4 CAPSULE | Refills: 5 | Status: SHIPPED | OUTPATIENT
Start: 2022-12-19 | End: 2023-05-08 | Stop reason: SDUPTHER

## 2022-12-19 RX ORDER — LIDOCAINE HYDROCHLORIDE 10 MG/ML
INJECTION INFILTRATION; PERINEURAL
Status: DISCONTINUED | OUTPATIENT
Start: 2022-12-19 | End: 2022-12-19 | Stop reason: HOSPADM

## 2022-12-19 NOTE — Clinical Note
A pulse oximeter was placed on the patient's left index finger and left index finger. Cataract symptoms i.e., glare, blur discussed. Pt to call if worsening vision or trouble with driving, TV, reading, ADL. UV precautions. Reviewed possibility of future cataract surgery.

## 2022-12-19 NOTE — DISCHARGE INSTRUCTIONS

## 2022-12-19 NOTE — DISCHARGE SUMMARY
[facility] - Short Stay Cardiac Unit  Discharge Note  Short Stay    Procedure(s) (LRB):  INSERTION, CATHETER, RIGHT HEART (Right)      OUTCOME: Patient tolerated treatment/procedure well without complication and is now ready for discharge.    DISPOSITION: Home or Self Care    FINAL DIAGNOSIS:  <principal problem not specified>    FOLLOWUP: In clinic    DISCHARGE INSTRUCTIONS:  No discharge procedures on file.     TIME SPENT ON DISCHARGE: 30 minutes

## 2022-12-19 NOTE — Clinical Note
The PA catheter was repositioned to the main pulmonary artery. Hemodynamics were performed. Cardiac output was obtained at 5 L/min. O2 saturation was measured at 46%. C.O=4.74  C.I=2.33

## 2022-12-19 NOTE — PROGRESS NOTES
Received patient from cath lab; patient is awake and alert; she walked from the wheelchair to chair without any issues

## 2022-12-19 NOTE — Clinical Note
The PA catheter was repositioned to the pulmonary wedge. Hemodynamics were performed. Confimed with fluroscopy.

## 2022-12-20 ENCOUNTER — TELEPHONE (OUTPATIENT)
Dept: TRANSPLANT | Facility: CLINIC | Age: 57
End: 2022-12-20
Payer: MEDICAID

## 2022-12-20 ENCOUNTER — TELEPHONE (OUTPATIENT)
Dept: ADMINISTRATIVE | Facility: HOSPITAL | Age: 57
End: 2022-12-20
Payer: MEDICAID

## 2022-12-20 DIAGNOSIS — I50.42 CHRONIC COMBINED SYSTOLIC AND DIASTOLIC HEART FAILURE: ICD-10-CM

## 2022-12-20 NOTE — TELEPHONE ENCOUNTER
I spoke with Mrs Hawley after getting her RHC yesterday- she has started taking Lasix 80 mg po bid this am ( was previously taking 40 mg po bid per recs of her local MD) Reviewed with MD- She will RTC in 1-2 weeks with labs per Dr Guardado.

## 2022-12-21 ENCOUNTER — TELEPHONE (OUTPATIENT)
Dept: TRANSPLANT | Facility: CLINIC | Age: 57
End: 2022-12-21
Payer: MEDICAID

## 2022-12-21 DIAGNOSIS — I42.8 NICM (NONISCHEMIC CARDIOMYOPATHY): Primary | ICD-10-CM

## 2022-12-21 DIAGNOSIS — Z76.82 ORGAN TRANSPLANT CANDIDATE: ICD-10-CM

## 2022-12-21 DIAGNOSIS — N18.30 STAGE 3 CHRONIC KIDNEY DISEASE, UNSPECIFIED WHETHER STAGE 3A OR 3B CKD: ICD-10-CM
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
## 2022-12-29 ENCOUNTER — TELEPHONE (OUTPATIENT)
Dept: TRANSPLANT | Facility: CLINIC | Age: 57
End: 2022-12-29
Payer: MEDICAID

## 2022-12-29 ENCOUNTER — EPISODE CHANGES (OUTPATIENT)
Dept: TRANSPLANT | Facility: CLINIC | Age: 57
End: 2022-12-29

## 2022-12-30 ENCOUNTER — TELEPHONE (OUTPATIENT)
Dept: FAMILY MEDICINE | Facility: CLINIC | Age: 57
End: 2022-12-30
Payer: MEDICAID

## 2022-12-30 DIAGNOSIS — K21.9 GASTROESOPHAGEAL REFLUX DISEASE, UNSPECIFIED WHETHER ESOPHAGITIS PRESENT: ICD-10-CM

## 2022-12-30 RX ORDER — ONDANSETRON 4 MG/1
4 TABLET, ORALLY DISINTEGRATING ORAL 2 TIMES DAILY
Qty: 20 TABLET | Refills: 0 | Status: SHIPPED | OUTPATIENT
Start: 2022-12-30 | End: 2023-01-13 | Stop reason: SDUPTHER

## 2022-12-30 RX ORDER — POTASSIUM CHLORIDE 750 MG/1
10 CAPSULE, EXTENDED RELEASE ORAL DAILY
Qty: 30 CAPSULE | Refills: 11 | Status: ON HOLD | OUTPATIENT
Start: 2022-12-30 | End: 2023-05-15 | Stop reason: HOSPADM

## 2022-12-30 RX ORDER — PANTOPRAZOLE SODIUM 40 MG/1
TABLET, DELAYED RELEASE ORAL
Qty: 30 TABLET | Refills: 11 | Status: SHIPPED | OUTPATIENT
Start: 2022-12-30 | End: 2023-12-05 | Stop reason: SDUPTHER

## 2022-12-30 NOTE — TELEPHONE ENCOUNTER
Patient unsure which medications she is needing to be on, patient asking for refill on Potassium - Pantoprazole - Eliquis 5 mg.     Please advise patient about medications.   Anything else patient should be taking? Patient is questioning Zofran, patches behind ear

## 2022-12-30 NOTE — TELEPHONE ENCOUNTER
Pt prescribed zofran for nausea and is also prescribed amiodarone (PACERONE) 200 MG Tab but is bringing up a drug interaction at the pharmacy. Please advise, thanks

## 2022-12-30 NOTE — TELEPHONE ENCOUNTER
Care Due:                  Date            Visit Type   Department     Provider  --------------------------------------------------------------------------------                                [facility]  Last Visit: 11-      FOLLOW UP    MEDICINE       Krysta Tony                              EP -                              [facility]  Next Visit: 01-      CARE ([facility])   MEDICINE       Cristobal Ann                                                            Last  Test          Frequency    Reason                     Performed    Due Date  --------------------------------------------------------------------------------    Lipid Panel.  12 months..  atorvastatin.............  12- 11-    [facility] Embedded Care Gaps. Reference number: [id number]. 12/30/2022   2:38:56 PM CST

## 2022-12-30 NOTE — TELEPHONE ENCOUNTER
----- Message from Frank Cook sent at 2022 12:45 PM CST -----  Contact: Patient  Hafsa Hawley  MRN: [id number]  : 1965  PCP: Krysta Tony  Home Phone      [phone]  Work Phone      Not on file.  Mobile          [phone]  Home Phone      [phone]      MESSAGE: in need of refills - Potassium - Pantoprazole - Eliquis 5 mg  (states increased @ hosp) -- unsure about which other meds she is supposed to be taking -- would like to speak with nurse -- uses [facility] in [place]    Call [phone]    PCP: Chavo

## 2023-01-03 ENCOUNTER — LAB VISIT (OUTPATIENT)
Dept: LAB | Facility: HOSPITAL | Age: 58
End: 2023-01-03
Attending: INTERNAL MEDICINE
Payer: MEDICAID

## 2023-01-03 DIAGNOSIS — I42.8 NICM (NONISCHEMIC CARDIOMYOPATHY): ICD-10-CM

## 2023-01-03 DIAGNOSIS — Z76.82 ORGAN TRANSPLANT CANDIDATE: ICD-10-CM

## 2023-01-03 DIAGNOSIS — I50.42 CHRONIC COMBINED SYSTOLIC AND DIASTOLIC HEART FAILURE: ICD-10-CM

## 2023-01-03 LAB
ALBUMIN SERPL BCP-MCNC: 3.9 G/DL (ref 3.5–5.2)
ALP SERPL-CCNC: 47 U/L (ref 55–135)
ALT SERPL W/O P-5'-P-CCNC: 15 U/L (ref 10–44)
ANION GAP SERPL CALC-SCNC: 11 MMOL/L (ref 8–16)
AST SERPL-CCNC: 16 U/L (ref 10–40)
BILIRUB SERPL-MCNC: 1.3 MG/DL (ref 0.1–1)
BUN SERPL-MCNC: 33 MG/DL (ref 6–20)
CALCIUM SERPL-MCNC: 9.3 MG/DL (ref 8.7–10.5)
CHLORIDE SERPL-SCNC: 109 MMOL/L (ref 95–110)
CO2 SERPL-SCNC: 22 MMOL/L (ref 23–29)
CREAT SERPL-MCNC: 2.2 MG/DL (ref 0.5–1.4)
EST. GFR  (NO RACE VARIABLE): 26 ML/MIN/1.73 M^2
GLUCOSE SERPL-MCNC: 100 MG/DL (ref 70–110)
POTASSIUM SERPL-SCNC: 3.7 MMOL/L (ref 3.5–5.1)
PROT SERPL-MCNC: 6.7 G/DL (ref 6–8.4)
SODIUM SERPL-SCNC: 142 MMOL/L (ref 136–145)

## 2023-01-03 PROCEDURE — 87340 HEPATITIS B SURFACE AG IA: CPT | Mod: TXP | Performed by: INTERNAL MEDICINE

## 2023-01-03 PROCEDURE — 86480 TB TEST CELL IMMUN MEASURE: CPT | Mod: TXP | Performed by: INTERNAL MEDICINE

## 2023-01-03 PROCEDURE — 80053 COMPREHEN METABOLIC PANEL: CPT | Mod: TXP | Performed by: INTERNAL MEDICINE

## 2023-01-03 PROCEDURE — 86682 HELMINTH ANTIBODY: CPT | Mod: TXP | Performed by: INTERNAL MEDICINE

## 2023-01-03 PROCEDURE — 86833 HLA CLASS II HIGH DEFIN QUAL: CPT | Mod: TXP | Performed by: INTERNAL MEDICINE

## 2023-01-03 PROCEDURE — 86592 SYPHILIS TEST NON-TREP QUAL: CPT | Mod: TXP | Performed by: INTERNAL MEDICINE

## 2023-01-03 PROCEDURE — 86704 HEP B CORE ANTIBODY TOTAL: CPT | Mod: TXP | Performed by: INTERNAL MEDICINE

## 2023-01-03 PROCEDURE — 86777 TOXOPLASMA ANTIBODY: CPT | Mod: TXP | Performed by: INTERNAL MEDICINE

## 2023-01-03 PROCEDURE — 86832 HLA CLASS I HIGH DEFIN QUAL: CPT | Mod: TXP | Performed by: INTERNAL MEDICINE

## 2023-01-03 PROCEDURE — 86790 VIRUS ANTIBODY NOS: CPT | Mod: TXP | Performed by: INTERNAL MEDICINE

## 2023-01-03 PROCEDURE — 86977 RBC SERUM PRETX INCUBJ/INHIB: CPT | Mod: 59,TXP | Performed by: INTERNAL MEDICINE

## 2023-01-03 PROCEDURE — 86706 HEP B SURFACE ANTIBODY: CPT | Mod: 91,TXP | Performed by: INTERNAL MEDICINE

## 2023-01-04 ENCOUNTER — HOSPITAL ENCOUNTER (INPATIENT)
Facility: HOSPITAL | Age: 58
LOS: 4 days | Discharge: HOME | DRG: 291 | End: 2023-01-08
Attending: EMERGENCY MEDICINE | Admitting: EMERGENCY MEDICINE
Payer: MEDICAID

## 2023-01-04 ENCOUNTER — DOCUMENTATION ONLY (OUTPATIENT)
Dept: TRANSPLANT | Facility: CLINIC | Age: 58
End: 2023-01-04

## 2023-01-04 ENCOUNTER — OFFICE VISIT (OUTPATIENT)
Dept: TRANSPLANT | Facility: CLINIC | Age: 58
DRG: 291 | End: 2023-01-04
Payer: MEDICAID

## 2023-01-04 VITALS
BODY MASS INDEX: 33.17 KG/M2 | SYSTOLIC BLOOD PRESSURE: 120 MMHG | WEIGHT: 206.38 LBS | HEART RATE: 78 BPM | HEIGHT: 66 IN | DIASTOLIC BLOOD PRESSURE: 80 MMHG

## 2023-01-04 DIAGNOSIS — N18.32 STAGE 3B CHRONIC KIDNEY DISEASE: ICD-10-CM

## 2023-01-04 DIAGNOSIS — J96.21 ACUTE ON CHRONIC RESPIRATORY FAILURE WITH HYPOXIA: ICD-10-CM

## 2023-01-04 DIAGNOSIS — R11.10 VOMITING: ICD-10-CM

## 2023-01-04 DIAGNOSIS — Z95.810 ICD (IMPLANTABLE CARDIOVERTER-DEFIBRILLATOR) IN PLACE: Chronic | ICD-10-CM

## 2023-01-04 DIAGNOSIS — R06.02 SHORTNESS OF BREATH: Primary | ICD-10-CM

## 2023-01-04 DIAGNOSIS — I50.42 CHRONIC COMBINED SYSTOLIC AND DIASTOLIC HEART FAILURE: ICD-10-CM

## 2023-01-04 DIAGNOSIS — I42.8 NICM (NONISCHEMIC CARDIOMYOPATHY): Chronic | ICD-10-CM

## 2023-01-04 DIAGNOSIS — I27.20 PULMONARY HTN: Chronic | ICD-10-CM

## 2023-01-04 DIAGNOSIS — R07.9 CHEST PAIN: ICD-10-CM

## 2023-01-04 DIAGNOSIS — N18.4 CKD (CHRONIC KIDNEY DISEASE), STAGE IV: ICD-10-CM

## 2023-01-04 DIAGNOSIS — I50.9 ACUTE DECOMPENSATED HEART FAILURE: ICD-10-CM

## 2023-01-04 DIAGNOSIS — I13.0 HYPERTENSIVE CARDIOVASCULAR-RENAL DISEASE, STAGE 1-4 OR UNSPECIFIED CHRONIC KIDNEY DISEASE, WITH HEART FAILURE: ICD-10-CM

## 2023-01-04 DIAGNOSIS — I50.9 CHF (CONGESTIVE HEART FAILURE): ICD-10-CM

## 2023-01-04 DIAGNOSIS — N17.9 AKI (ACUTE KIDNEY INJURY): ICD-10-CM

## 2023-01-04 DIAGNOSIS — I50.9 CONGESTIVE HEART FAILURE, UNSPECIFIED HF CHRONICITY, UNSPECIFIED HEART FAILURE TYPE: Primary | ICD-10-CM

## 2023-01-04 PROBLEM — E11.9 TYPE 2 DIABETES MELLITUS WITHOUT COMPLICATION, WITHOUT LONG-TERM CURRENT USE OF INSULIN: Chronic | Status: ACTIVE | Noted: 2020-08-13

## 2023-01-04 PROBLEM — I24.89 DEMAND ISCHEMIA: Status: ACTIVE | Noted: 2023-01-04

## 2023-01-04 LAB
ACANTHOCYTES BLD QL SMEAR: PRESENT
ALBUMIN SERPL BCP-MCNC: 3.8 G/DL (ref 3.5–5.2)
ALP SERPL-CCNC: 45 U/L (ref 55–135)
ALT SERPL W/O P-5'-P-CCNC: 12 U/L (ref 10–44)
ANION GAP SERPL CALC-SCNC: 8 MMOL/L (ref 8–16)
ANISOCYTOSIS BLD QL SMEAR: ABNORMAL
AST SERPL-CCNC: 19 U/L (ref 10–40)
BACTERIA #/AREA URNS AUTO: ABNORMAL /HPF
BASO STIPL BLD QL SMEAR: ABNORMAL
BASOPHILS # BLD AUTO: 0.01 K/UL (ref 0–0.2)
BASOPHILS NFR BLD: 0.3 % (ref 0–1.9)
BILIRUB SERPL-MCNC: 2 MG/DL (ref 0.1–1)
BILIRUB UR QL STRIP: NEGATIVE
BNP SERPL-MCNC: 911 PG/ML (ref 0–99)
BUN SERPL-MCNC: 27 MG/DL (ref 6–20)
BURR CELLS BLD QL SMEAR: ABNORMAL
CALCIUM SERPL-MCNC: 9 MG/DL (ref 8.7–10.5)
CHLORIDE SERPL-SCNC: 110 MMOL/L (ref 95–110)
CLARITY UR REFRACT.AUTO: CLEAR
CO2 SERPL-SCNC: 22 MMOL/L (ref 23–29)
COLOR UR AUTO: COLORLESS
CREAT SERPL-MCNC: 1.7 MG/DL (ref 0.5–1.4)
DACRYOCYTES BLD QL SMEAR: ABNORMAL
DIFFERENTIAL METHOD: ABNORMAL
EOSINOPHIL # BLD AUTO: 0 K/UL (ref 0–0.5)
EOSINOPHIL NFR BLD: 0.9 % (ref 0–8)
ERYTHROCYTE [DISTWIDTH] IN BLOOD BY AUTOMATED COUNT: 27.5 % (ref 11.5–14.5)
EST. GFR  (NO RACE VARIABLE): 34.8 ML/MIN/1.73 M^2
GLUCOSE SERPL-MCNC: 104 MG/DL (ref 70–110)
GLUCOSE UR QL STRIP: ABNORMAL
HAV IGG SER QL IA: REACTIVE
HBV CORE AB SERPL QL IA: NORMAL
HBV SURFACE AB SER-ACNC: 158.83 MIU/ML
HBV SURFACE AB SER-ACNC: REACTIVE M[IU]/ML
HBV SURFACE AG SERPL QL IA: NORMAL
HCT VFR BLD AUTO: 30.4 % (ref 37–48.5)
HGB BLD-MCNC: 9 G/DL (ref 12–16)
HGB UR QL STRIP: NEGATIVE
HPRA INTERPRETATION: NORMAL
HYALINE CASTS UR QL AUTO: 3 /LPF
IMM GRANULOCYTES # BLD AUTO: 0.01 K/UL (ref 0–0.04)
IMM GRANULOCYTES NFR BLD AUTO: 0.3 % (ref 0–0.5)
INFLUENZA A, MOLECULAR: NOT DETECTED
INFLUENZA B, MOLECULAR: NOT DETECTED
KETONES UR QL STRIP: NEGATIVE
LACTATE SERPL-SCNC: 0.6 MMOL/L (ref 0.5–2.2)
LEUKOCYTE ESTERASE UR QL STRIP: NEGATIVE
LYMPHOCYTES # BLD AUTO: 0.7 K/UL (ref 1–4.8)
LYMPHOCYTES NFR BLD: 20.5 % (ref 18–48)
MCH RBC QN AUTO: 18.2 PG (ref 27–31)
MCHC RBC AUTO-ENTMCNC: 29.6 G/DL (ref 32–36)
MCV RBC AUTO: 61 FL (ref 82–98)
MICROSCOPIC COMMENT: ABNORMAL
MONOCYTES # BLD AUTO: 0.4 K/UL (ref 0.3–1)
MONOCYTES NFR BLD: 12.4 % (ref 4–15)
NEUTROPHILS # BLD AUTO: 2.1 K/UL (ref 1.8–7.7)
NEUTROPHILS NFR BLD: 65.6 % (ref 38–73)
NITRITE UR QL STRIP: NEGATIVE
NRBC BLD-RTO: 0 /100 WBC
OVALOCYTES BLD QL SMEAR: ABNORMAL
PH UR STRIP: 5 [PH] (ref 5–8)
PLATELET # BLD AUTO: 187 K/UL (ref 150–450)
PLATELET BLD QL SMEAR: ABNORMAL
PMV BLD AUTO: ABNORMAL FL (ref 9.2–12.9)
POCT GLUCOSE: 182 MG/DL (ref 70–110)
POCT GLUCOSE: 183 MG/DL (ref 70–110)
POIKILOCYTOSIS BLD QL SMEAR: ABNORMAL
POLYCHROMASIA BLD QL SMEAR: ABNORMAL
POTASSIUM SERPL-SCNC: 4 MMOL/L (ref 3.5–5.1)
PROCALCITONIN SERPL IA-MCNC: 0.07 NG/ML
PROT SERPL-MCNC: 6.5 G/DL (ref 6–8.4)
PROT UR QL STRIP: ABNORMAL
RBC # BLD AUTO: 4.95 M/UL (ref 4–5.4)
RPR SER QL: NORMAL
RSV AG BY MOLECULAR METHOD: NOT DETECTED
SARS-COV-2 RNA RESP QL NAA+PROBE: NOT DETECTED
SCHISTOCYTES BLD QL SMEAR: ABNORMAL
SCHISTOCYTES BLD QL SMEAR: PRESENT
SODIUM SERPL-SCNC: 140 MMOL/L (ref 136–145)
SP GR UR STRIP: 1 (ref 1–1.03)
SPHEROCYTES BLD QL SMEAR: ABNORMAL
SQUAMOUS #/AREA URNS AUTO: 7 /HPF
T GONDII IGG SER QL IA: NORMAL
T GONDII IGG SERPL IA-ACNC: <5 IU/ML (ref 0–6.4)
TARGETS BLD QL SMEAR: ABNORMAL
TROPONIN I SERPL DL<=0.01 NG/ML-MCNC: 0.71 NG/ML (ref 0–0.03)
TROPONIN I SERPL DL<=0.01 NG/ML-MCNC: 0.77 NG/ML (ref 0–0.03)
URN SPEC COLLECT METH UR: ABNORMAL
WBC # BLD AUTO: 3.22 K/UL (ref 3.9–12.7)
WBC #/AREA URNS AUTO: 11 /HPF (ref 0–5)
YEAST UR QL AUTO: ABNORMAL

## 2023-01-04 PROCEDURE — 80053 COMPREHEN METABOLIC PANEL: CPT | Mod: NTX | Performed by: EMERGENCY MEDICINE

## 2023-01-04 PROCEDURE — 85025 COMPLETE CBC W/AUTO DIFF WBC: CPT | Mod: NTX | Performed by: EMERGENCY MEDICINE

## 2023-01-04 PROCEDURE — 63600175 PHARM REV CODE 636 W HCPCS: Mod: NTX

## 2023-01-04 PROCEDURE — 93005 ELECTROCARDIOGRAM TRACING: CPT | Mod: NTX

## 2023-01-04 PROCEDURE — 99900035 HC TECH TIME PER 15 MIN (STAT): Mod: NTX

## 2023-01-04 PROCEDURE — 0241U SARS-COV2 (COVID) WITH FLU/RSV BY PCR: CPT | Mod: NTX | Performed by: EMERGENCY MEDICINE

## 2023-01-04 PROCEDURE — 25000242 PHARM REV CODE 250 ALT 637 W/ HCPCS: Mod: NTX

## 2023-01-04 PROCEDURE — 3074F SYST BP LT 130 MM HG: CPT | Mod: CPTII,TXP,, | Performed by: INTERNAL MEDICINE

## 2023-01-04 PROCEDURE — 3044F HG A1C LEVEL LT 7.0%: CPT | Mod: CPTII,TXP,, | Performed by: INTERNAL MEDICINE

## 2023-01-04 PROCEDURE — 3079F DIAST BP 80-89 MM HG: CPT | Mod: CPTII,TXP,, | Performed by: INTERNAL MEDICINE

## 2023-01-04 PROCEDURE — 3079F PR MOST RECENT DIASTOLIC BLOOD PRESSURE 80-89 MM HG: ICD-10-PCS | Mod: CPTII,TXP,, | Performed by: INTERNAL MEDICINE

## 2023-01-04 PROCEDURE — 83605 ASSAY OF LACTIC ACID: CPT | Mod: NTX

## 2023-01-04 PROCEDURE — 3008F PR BODY MASS INDEX (BMI) DOCUMENTED: ICD-10-PCS | Mod: CPTII,TXP,, | Performed by: INTERNAL MEDICINE

## 2023-01-04 PROCEDURE — 99223 PR INITIAL HOSPITAL CARE,LEVL III: ICD-10-PCS | Mod: NTX,,, | Performed by: STUDENT IN AN ORGANIZED HEALTH CARE EDUCATION/TRAINING PROGRAM

## 2023-01-04 PROCEDURE — 99215 OFFICE O/P EST HI 40 MIN: CPT | Mod: S$PBB,TXP,, | Performed by: INTERNAL MEDICINE

## 2023-01-04 PROCEDURE — 3008F BODY MASS INDEX DOCD: CPT | Mod: CPTII,TXP,, | Performed by: INTERNAL MEDICINE

## 2023-01-04 PROCEDURE — 99212 OFFICE O/P EST SF 10 MIN: CPT | Mod: PBBFAC,TXP | Performed by: INTERNAL MEDICINE

## 2023-01-04 PROCEDURE — 99285 EMERGENCY DEPT VISIT HI MDM: CPT | Mod: 25,27,NTX

## 2023-01-04 PROCEDURE — 99999 PR PBB SHADOW E&M-EST. PATIENT-LVL II: ICD-10-PCS | Mod: PBBFAC,TXP,, | Performed by: INTERNAL MEDICINE

## 2023-01-04 PROCEDURE — 12000002 HC ACUTE/MED SURGE SEMI-PRIVATE ROOM: Mod: NTX

## 2023-01-04 PROCEDURE — 83880 ASSAY OF NATRIURETIC PEPTIDE: CPT | Mod: NTX | Performed by: EMERGENCY MEDICINE

## 2023-01-04 PROCEDURE — 99285 PR EMERGENCY DEPT VISIT,LEVEL V: ICD-10-PCS | Mod: CS,,, | Performed by: EMERGENCY MEDICINE

## 2023-01-04 PROCEDURE — 3044F PR MOST RECENT HEMOGLOBIN A1C LEVEL <7.0%: ICD-10-PCS | Mod: CPTII,TXP,, | Performed by: INTERNAL MEDICINE

## 2023-01-04 PROCEDURE — 3074F PR MOST RECENT SYSTOLIC BLOOD PRESSURE < 130 MM HG: ICD-10-PCS | Mod: CPTII,TXP,, | Performed by: INTERNAL MEDICINE

## 2023-01-04 PROCEDURE — 99215 PR OFFICE/OUTPT VISIT, EST, LEVL V, 40-54 MIN: ICD-10-PCS | Mod: S$PBB,TXP,, | Performed by: INTERNAL MEDICINE

## 2023-01-04 PROCEDURE — 81001 URINALYSIS AUTO W/SCOPE: CPT | Mod: 91,NTX

## 2023-01-04 PROCEDURE — 27000221 HC OXYGEN, UP TO 24 HOURS: Mod: NTX

## 2023-01-04 PROCEDURE — 94640 AIRWAY INHALATION TREATMENT: CPT | Mod: NTX

## 2023-01-04 PROCEDURE — 99999 PR PBB SHADOW E&M-EST. PATIENT-LVL II: CPT | Mod: PBBFAC,TXP,, | Performed by: INTERNAL MEDICINE

## 2023-01-04 PROCEDURE — 99223 1ST HOSP IP/OBS HIGH 75: CPT | Mod: NTX,,, | Performed by: STUDENT IN AN ORGANIZED HEALTH CARE EDUCATION/TRAINING PROGRAM

## 2023-01-04 PROCEDURE — 87086 URINE CULTURE/COLONY COUNT: CPT | Mod: NTX

## 2023-01-04 PROCEDURE — 93010 ELECTROCARDIOGRAM REPORT: CPT | Mod: NTX,,, | Performed by: INTERNAL MEDICINE

## 2023-01-04 PROCEDURE — 99285 EMERGENCY DEPT VISIT HI MDM: CPT | Mod: CS,,, | Performed by: EMERGENCY MEDICINE

## 2023-01-04 PROCEDURE — 84145 PROCALCITONIN (PCT): CPT | Mod: NTX

## 2023-01-04 PROCEDURE — 94761 N-INVAS EAR/PLS OXIMETRY MLT: CPT | Mod: NTX

## 2023-01-04 PROCEDURE — 25000003 PHARM REV CODE 250: Mod: NTX

## 2023-01-04 PROCEDURE — 84484 ASSAY OF TROPONIN QUANT: CPT | Mod: NTX

## 2023-01-04 PROCEDURE — 93010 EKG 12-LEAD: ICD-10-PCS | Mod: NTX,,, | Performed by: INTERNAL MEDICINE

## 2023-01-04 PROCEDURE — 84484 ASSAY OF TROPONIN QUANT: CPT | Mod: 91,NTX | Performed by: EMERGENCY MEDICINE

## 2023-01-04 RX ORDER — IPRATROPIUM BROMIDE AND ALBUTEROL SULFATE 2.5; .5 MG/3ML; MG/3ML
3 SOLUTION RESPIRATORY (INHALATION) 2 TIMES DAILY
Status: DISCONTINUED | OUTPATIENT
Start: 2023-01-04 | End: 2023-01-06

## 2023-01-04 RX ORDER — NITROGLYCERIN 0.4 MG/1
0.4 TABLET SUBLINGUAL EVERY 5 MIN PRN
Status: DISCONTINUED | OUTPATIENT
Start: 2023-01-04 | End: 2023-01-08 | Stop reason: HOSPADM

## 2023-01-04 RX ORDER — DULOXETIN HYDROCHLORIDE 30 MG/1
30 CAPSULE, DELAYED RELEASE ORAL DAILY
Status: DISCONTINUED | OUTPATIENT
Start: 2023-01-05 | End: 2023-01-08 | Stop reason: HOSPADM

## 2023-01-04 RX ORDER — GLUCAGON 1 MG
1 KIT INJECTION
Status: DISCONTINUED | OUTPATIENT
Start: 2023-01-04 | End: 2023-01-08 | Stop reason: HOSPADM

## 2023-01-04 RX ORDER — FUROSEMIDE 10 MG/ML
80 INJECTION INTRAMUSCULAR; INTRAVENOUS
Status: DISCONTINUED | OUTPATIENT
Start: 2023-01-04 | End: 2023-01-07

## 2023-01-04 RX ORDER — NALOXONE HCL 0.4 MG/ML
0.02 VIAL (ML) INJECTION
Status: DISCONTINUED | OUTPATIENT
Start: 2023-01-04 | End: 2023-01-08 | Stop reason: HOSPADM

## 2023-01-04 RX ORDER — IBUPROFEN 200 MG
16 TABLET ORAL
Status: DISCONTINUED | OUTPATIENT
Start: 2023-01-04 | End: 2023-01-08 | Stop reason: HOSPADM

## 2023-01-04 RX ORDER — SODIUM CHLORIDE 0.9 % (FLUSH) 0.9 %
10 SYRINGE (ML) INJECTION
Status: DISCONTINUED | OUTPATIENT
Start: 2023-01-04 | End: 2023-01-08 | Stop reason: HOSPADM

## 2023-01-04 RX ORDER — ERGOCALCIFEROL 1.25 MG/1
50000 CAPSULE ORAL
Status: DISCONTINUED | OUTPATIENT
Start: 2023-01-05 | End: 2023-01-08 | Stop reason: HOSPADM

## 2023-01-04 RX ORDER — METOPROLOL SUCCINATE 50 MG/1
100 TABLET, EXTENDED RELEASE ORAL DAILY
Status: DISCONTINUED | OUTPATIENT
Start: 2023-01-05 | End: 2023-01-05

## 2023-01-04 RX ORDER — ONDANSETRON 4 MG/1
4 TABLET, ORALLY DISINTEGRATING ORAL 2 TIMES DAILY
Status: DISCONTINUED | OUTPATIENT
Start: 2023-01-04 | End: 2023-01-04

## 2023-01-04 RX ORDER — HYDRALAZINE HYDROCHLORIDE 50 MG/1
100 TABLET, FILM COATED ORAL 3 TIMES DAILY
Status: DISCONTINUED | OUTPATIENT
Start: 2023-01-04 | End: 2023-01-08 | Stop reason: HOSPADM

## 2023-01-04 RX ORDER — MONTELUKAST SODIUM 10 MG/1
10 TABLET ORAL DAILY
Status: DISCONTINUED | OUTPATIENT
Start: 2023-01-05 | End: 2023-01-08 | Stop reason: HOSPADM

## 2023-01-04 RX ORDER — AMIODARONE HYDROCHLORIDE 200 MG/1
200 TABLET ORAL DAILY
Status: DISCONTINUED | OUTPATIENT
Start: 2023-01-05 | End: 2023-01-08 | Stop reason: HOSPADM

## 2023-01-04 RX ORDER — ALLOPURINOL 100 MG/1
100 TABLET ORAL DAILY
Status: DISCONTINUED | OUTPATIENT
Start: 2023-01-05 | End: 2023-01-08 | Stop reason: HOSPADM

## 2023-01-04 RX ORDER — ACETAMINOPHEN 325 MG/1
650 TABLET ORAL EVERY 8 HOURS PRN
Status: DISCONTINUED | OUTPATIENT
Start: 2023-01-04 | End: 2023-01-08 | Stop reason: HOSPADM

## 2023-01-04 RX ORDER — ATORVASTATIN CALCIUM 20 MG/1
40 TABLET, FILM COATED ORAL NIGHTLY
Status: DISCONTINUED | OUTPATIENT
Start: 2023-01-04 | End: 2023-01-08 | Stop reason: HOSPADM

## 2023-01-04 RX ORDER — PANTOPRAZOLE SODIUM 40 MG/1
40 TABLET, DELAYED RELEASE ORAL DAILY
Status: DISCONTINUED | OUTPATIENT
Start: 2023-01-05 | End: 2023-01-08 | Stop reason: HOSPADM

## 2023-01-04 RX ORDER — FLUTICASONE FUROATE AND VILANTEROL 200; 25 UG/1; UG/1
1 POWDER RESPIRATORY (INHALATION) DAILY
Status: DISCONTINUED | OUTPATIENT
Start: 2023-01-05 | End: 2023-01-08 | Stop reason: HOSPADM

## 2023-01-04 RX ORDER — POLYETHYLENE GLYCOL 3350 17 G/17G
17 POWDER, FOR SOLUTION ORAL 3 TIMES DAILY PRN
Status: DISCONTINUED | OUTPATIENT
Start: 2023-01-04 | End: 2023-01-08 | Stop reason: HOSPADM

## 2023-01-04 RX ORDER — POTASSIUM CHLORIDE 750 MG/1
10 CAPSULE, EXTENDED RELEASE ORAL DAILY
Status: DISCONTINUED | OUTPATIENT
Start: 2023-01-05 | End: 2023-01-08 | Stop reason: HOSPADM

## 2023-01-04 RX ORDER — TALC
6 POWDER (GRAM) TOPICAL NIGHTLY PRN
Status: DISCONTINUED | OUTPATIENT
Start: 2023-01-04 | End: 2023-01-08 | Stop reason: HOSPADM

## 2023-01-04 RX ORDER — IBUPROFEN 200 MG
24 TABLET ORAL
Status: DISCONTINUED | OUTPATIENT
Start: 2023-01-04 | End: 2023-01-08 | Stop reason: HOSPADM

## 2023-01-04 RX ADMIN — ATORVASTATIN CALCIUM 40 MG: 20 TABLET, FILM COATED ORAL at 08:01

## 2023-01-04 RX ADMIN — SACUBITRIL AND VALSARTAN 1 TABLET: 97; 103 TABLET, FILM COATED ORAL at 08:01

## 2023-01-04 RX ADMIN — FUROSEMIDE 80 MG: 10 INJECTION, SOLUTION INTRAMUSCULAR; INTRAVENOUS at 05:01

## 2023-01-04 RX ADMIN — IPRATROPIUM BROMIDE AND ALBUTEROL SULFATE 3 ML: .5; 3 SOLUTION RESPIRATORY (INHALATION) at 07:01

## 2023-01-04 RX ADMIN — ISOSORBIDE DINITRATE 30 MG: 10 TABLET ORAL at 08:01

## 2023-01-04 RX ADMIN — APIXABAN 5 MG: 5 TABLET, FILM COATED ORAL at 08:01

## 2023-01-04 RX ADMIN — HYDRALAZINE HYDROCHLORIDE 100 MG: 50 TABLET ORAL at 08:01

## 2023-01-04 RX ADMIN — ACETAMINOPHEN 650 MG: 325 TABLET ORAL at 08:01

## 2023-01-04 NOTE — ED PROVIDER NOTES
"Encounter Date: 1/4/2023       History     Chief Complaint   Patient presents with    Vomiting     Arrived with heart transplant clinic staff for vomiting, pt states have been feeling bad for months, being worked up for a heart transplant     57-year-old female, history of severe CHF with an EF of 10%, pulmonary hypertension, AFib on anticoagulation, CKD, hypertension, referred to the ED from the cardiology clinic.  Patient was there to discuss advanced options, she had been declined for transplant and LVAD last year secondary to CKD and concern for underlying pulmonary disease, in the clinic today she was noted to be coughing and ill-appearing.  Patient reports that she has had a cough for months, intermittently productive, severe at times, associated with intermittent nausea and vomiting and diarrhea.  No fevers or chills.  She reports no nausea at this time, reports taking a Zofran earlier.    The history is provided by the patient.   Review of patient's allergies indicates:   Allergen Reactions    Penicillins Hives and Other (See Comments)    Iodinated contrast media Nausea And Vomiting    Oxycodone-acetaminophen Other (See Comments)     Nausea, Dizziness, Anxiety.  "I don't like how it makes me feel."   Given Hydromorphone 0.5mg IVP  Without problems.  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 Metabolism of other drugs not listed here may also be impacted by UGT1A1 " enzyme activity.       Tramadol Nausea And Vomiting and Other (See Comments)     Other reaction(s): Other (See Comments)    Valsartan Other (See Comments)     Past Medical History:   Diagnosis Date    Anemia     Anticoagulant long-term use     Arthritis     Atrial fibrillation     CKD (chronic kidney disease), stage IV 5/8/2018    Congestive heart failure     COPD (chronic obstructive pulmonary disease)     Deep vein thrombosis     elevated bilirubin d/t Gilbert's syndrome     confirmed by [place] genetic testing, evaluated by hepatology    Encounter for blood transfusion     GERD (gastroesophageal reflux disease)     Hypertension     Hypertensive cardiovascular-renal disease, stage 1-4 or unspecified chronic kidney disease, with heart failure 3/4/2022    Pheochromocytoma, malignant     Restrictive lung disease 4/26/2022    Right kidney mass     Sleep apnea     Thalassemia trait, alpha     Thyroid disease     Type 2 diabetes mellitus with hyperglycemia, without long-term current use of insulin 8/13/2020     Past Surgical History:   Procedure Laterality Date    APPENDECTOMY      BONE MARROW BIOPSY      CARDIAC DEFIBRILLATOR PLACEMENT Left 12/2016    CARDIAC ELECTROPHYSIOLOGY MAPPING AND ABLATION      CARDIAC ELECTROPHYSIOLOGY MAPPING AND ABLATION      COLONOSCOPY N/A 5/6/2022    Procedure: COLONOSCOPY;  Surgeon: Arely Betancourt MD;  Location: [facility] ([address] [place] [address]);  Service: Endoscopy;  Laterality: N/A;  heart transplant candidate/ EF 25% - 2nd floor/ defib - Biotron[facility] - ER[facility]  Eliquis - per Dr. Cortez with [facility], Pt ok to hold Eliquis x 2 days prior-see media tab-outside correspondence dated 12/30/21  - [facility]  verbal instructions/portal instructions/email instructions - s    EYE SURGERY      due to running tears    FRACTURE SURGERY Left     hand 5th digit    HYSTERECTOMY      KNEE SURGERY Left 2016    hematoma    LIVER BIOPSY  10/24/2018    Minimal steatosis, predominantly macrovesicular, 1%, Minimal nonspecific portal  inflammation, no fibrosis. No findings on biopsy to explain elevated bilirubin levels. Could be d/t Gilbert's =?- hemolysis    RIGHT HEART CATHETERIZATION Right 2021    Procedure: INSERTION, CATHETER, RIGHT HEART;  Surgeon: Irving Cardenas MD;  Location: [facility] CATH LAB;  Service: Cardiology;  Laterality: Right;    RIGHT HEART CATHETERIZATION Right 2022    Procedure: INSERTION, CATHETER, RIGHT HEART;  Surgeon: Burke Camilo MD;  Location: [facility] CATH LAB;  Service: Cardiology;  Laterality: Right;    TRANSJUGULAR BIOPSY OF LIVER N/A 10/24/2018    Procedure: BIOPSY, LIVER, TRANSJUGULAR APPROACH;  Surgeon: Carmen Diagnostic Provider;  Location: [facility] OR [address] [place] [address];  Service: Radiology;  Laterality: N/A;     Family History   Problem Relation Age of Onset    Cancer Mother         pancreatic CA early 50's    Heart disease Father          MI in late 50's    Hypertension Father     Heart attack Father     Heart disease Sister     Heart disease Brother     Cirrhosis Brother         alcoholic    Heart disease Sister     Heart disease Brother     Hypertension Brother     Diabetes Brother      Social History     Tobacco Use    Smoking status: Never    Smokeless tobacco: Never   Substance Use Topics    Alcohol use: Yes     Comment: up to 1 yr ago drank 2-3 drinks on occasion but sporadic    Drug use: No     Review of Systems   Constitutional:  Negative for chills and fever.   Respiratory:  Positive for cough and shortness of breath. Negative for wheezing and stridor.    Cardiovascular:  Negative for chest pain and leg swelling.   Gastrointestinal:  Positive for diarrhea, nausea and vomiting. Negative for abdominal pain.   All other systems reviewed and are negative.    Physical Exam     Initial Vitals [23 1039]   BP Pulse Resp Temp SpO2   121/74 72 20 98.9 °F (37.2 °C) (!) 94 %      MAP       --         Physical Exam    Nursing note and vitals reviewed.  Constitutional: Vital signs are normal. She appears  well-developed and well-nourished. She is not diaphoretic.  Non-toxic appearance. She does not appear ill. No distress.   HENT:   Head: Normocephalic and atraumatic.   Mouth/Throat: Oropharynx is clear and moist and mucous membranes are normal. Mucous membranes are not dry.   Eyes: Conjunctivae and lids are normal.   Neck: Neck supple.   Normal range of motion.  Cardiovascular:  Normal rate.           Pulmonary/Chest: Breath sounds normal. No respiratory distress. She has no wheezes. She has no rales.   Abdominal: Abdomen is soft. She exhibits no distension. There is no abdominal tenderness. There is no rebound.   Musculoskeletal:         General: No tenderness or edema.      Cervical back: Normal range of motion and neck supple.     Neurological: She is alert and oriented to person, place, and time.   Skin: Skin is dry and intact. No pallor.   Psychiatric: She has a normal mood and affect. Her speech is normal and behavior is normal.       ED Course   Procedures  Labs Reviewed   COMPREHENSIVE METABOLIC PANEL - Abnormal; Notable for the following components:       Result Value    CO2 22 (*)     BUN 27 (*)     Creatinine 1.7 (*)     Total Bilirubin 2.0 (*)     Alkaline Phosphatase 45 (*)     eGFR 34.8 (*)     All other components within normal limits   CBC W/ AUTO DIFFERENTIAL - Abnormal; Notable for the following components:    WBC 3.22 (*)     Hemoglobin 9.0 (*)     Hematocrit 30.4 (*)     MCV 61 (*)     MCH 18.2 (*)     MCHC 29.6 (*)     RDW 27.5 (*)     Lymph # 0.7 (*)     All other components within normal limits   B-TYPE NATRIURETIC PEPTIDE - Abnormal; Notable for the following components:     (*)     All other components within normal limits   TROPONIN I - Abnormal; Notable for the following components:    Troponin I 0.710 (*)     All other components within normal limits   TROPONIN I - Abnormal; Notable for the following components:    Troponin I 0.768 (*)     All other components within normal limits    URINALYSIS, REFLEX TO URINE CULTURE - Abnormal; Notable for the following components:    Color, UA Colorless (*)     Protein, UA Trace (*)     Glucose, UA 3+ (*)     All other components within normal limits    Narrative:     Specimen Source->Urine   URINALYSIS MICROSCOPIC - Abnormal; Notable for the following components:    WBC, UA 11 (*)     Hyaline Casts, UA 3 (*)     All other components within normal limits    Narrative:     Specimen Source->Urine   CULTURE, URINE   SARS-COV2 (COVID) WITH FLU/RSV BY PCR   PROCALCITONIN   LACTIC ACID, PLASMA        ECG Results              EKG 12-lead (Final result)  Result time 01/04/23 12:59:37      Final result by Interface, Lab In [facility] (01/04/23 12:59:37)                   Narrative:    Test Reason : R11.10,    Vent. Rate : 075 BPM     Atrial Rate : 075 BPM     P-R Int : 208 ms          QRS Dur : 118 ms      QT Int : 456 ms       P-R-T Axes : 069 -65 087 degrees     QTc Int : 509 ms    Sinus rhythm with 1st degree A-V block and  with frequent Premature  ventricular complexes  Left anterior fascicular block  Prolonged QT  Abnormal ECG  When compared with ECG of 19-DEC-2022 10:37,  No significant change was found  Confirmed by Heaven Miller MD ([id number]) on 1/4/2023 12:59:28 PM    Referred By: [email or web address]   SELF           Confirmed By:Heaven Miller MD                                  Imaging Results              X-Ray Chest AP Portable (Final result)  Result time 01/04/23 13:05:43      Final result by Cristobal Hsu MD (01/04/23 13:05:43)                   Impression:      See above      Electronically signed by: Cristobal Hsu MD  Date:    01/04/2023  Time:    13:05               Narrative:    EXAMINATION:  XR CHEST AP PORTABLE    CLINICAL HISTORY:  cough;    TECHNIQUE:  Single frontal view of the chest was performed.    COMPARISON:  11/15/2022    FINDINGS:  Cardiac size is enlarged.  AICD unit is present.  Pulmonary vascularity is not quite is congested is a was  previously.  Pleural effusion is present on the left.                                       Medications   sodium chloride 0.9% flush 10 mL (has no administration in time range)   melatonin tablet 6 mg (has no administration in time range)   naloxone 0.4 mg/mL injection 0.02 mg (has no administration in time range)   glucose chewable tablet 16 g (has no administration in time range)   glucose chewable tablet 24 g (has no administration in time range)   dextrose 10% bolus 125 mL 125 mL (has no administration in time range)   dextrose 10% bolus 250 mL 250 mL (has no administration in time range)   glucagon (human recombinant) injection 1 mg (has no administration in time range)   acetaminophen tablet 650 mg (650 mg Oral Given 1/4/23 2031)   albuterol-ipratropium 2.5 mg-0.5 mg/3 mL nebulizer solution 3 mL ( Nebulization Canceled Entry 1/4/23 2100)   allopurinoL tablet 100 mg (has no administration in time range)   amiodarone tablet 200 mg (has no administration in time range)   apixaban tablet 5 mg (5 mg Oral Given 1/4/23 2028)   atorvastatin tablet 40 mg (40 mg Oral Given 1/4/23 2027)   DULoxetine DR capsule 30 mg (has no administration in time range)   ergocalciferol capsule 50,000 Units (has no administration in time range)   hydrALAZINE tablet 100 mg (100 mg Oral Given 1/4/23 2027)   isosorbide dinitrate tablet 30 mg (30 mg Oral Given 1/4/23 2027)   metoprolol succinate (TOPROL-XL) 24 hr tablet 100 mg (has no administration in time range)   fluticasone furoate-vilanteroL 200-25 mcg/dose diskus inhaler 1 puff (has no administration in time range)   montelukast tablet 10 mg (has no administration in time range)   nitroGLYCERIN SL tablet 0.4 mg (has no administration in time range)   pantoprazole EC tablet 40 mg (has no administration in time range)   polyethylene glycol packet 17 g (has no administration in time range)   potassium chloride CR capsule 10 mEq (has no administration in time range)   sacubitriL-valsartan   mg per tablet 1 tablet (1 tablet Oral Given 1/4/23 2027)   furosemide injection 80 mg (80 mg Intravenous Given 1/4/23 1708)     Medical Decision Making:   History:   Old Medical Records: I decided to obtain old medical records.  Initial Assessment:   Cough for months  Vital signs normal except for borderline O2 sat  Lungs are clear bilaterally  Apparently vomiting up in the clinic but in the ED looks calm and comfortable, abdominal exam benign  Differential Diagnosis:   Chronic bronchitis  Pneumonia  CHF exacerbation    Clinical Tests:   Lab Tests: Ordered and Reviewed  Radiological Study: Ordered and Reviewed  Medical Tests: Reviewed and Ordered  ED Management:  Labs  CXR  Dispo uncertain           ED Course as of 01/04/23 2049 Wed Jan 04, 2023   1449 SARS-CoV2 (COVID-19) Qualitative PCR: Not Detected [AS]   1449 Influenza A, Molecular: Not Detected [AS]   1449 Influenza B, Molecular: Not Detected [AS]   1449 BNP(!): 911 [AS]   1449 BNP lower than normal [AS]   1449 Troponin I(!): 0.710 [AS]   1449 Troponin elevated at baseline [AS]   1449 Creatinine(!): 1.7 [AS]   1449 BUN(!): 27 [AS]   1449 Renal function at baseline [AS]   1450 Reassessed patient.  Offered her admission for further management.  She states that the way she feels today is the way that she feels every day and that there been no acute changes at all in her symptoms.  She says that she intermittently vomits but this has been going on for a long time.  Since her stay in the ED she is not had any episodes of vomiting. [AS]   1459 Patient reassessed.  Her O2 sat drops to the high 80s with ambulation.  Per review of the chart seems that in 1 note is documented that she is on oxygen at home patient says that she is only supposed to use it p.r.n. though.  I am worried given her multiple comorbidities,  hypoxia, ill appearance in the cardiology clinic, we will admit her for observation. [AS]      ED Course User Index  [AS] Sophia Franklin MD                  Clinical Impression:   Final diagnoses:  [R11.10] Vomiting  [I50.9] Congestive heart failure, unspecified HF chronicity, unspecified heart failure type (Primary)        ED Disposition Condition    Admit Stable                Sophia Franklin MD  01/04/23 2050

## 2023-01-04 NOTE — PROGRESS NOTES
Subjective:   Transplant status: active    HPI:  Ms. Hawley is a 57 y.o. year old Black or  female who has presented to be evaluated as a potential heart transplant recipient.      56 YO F who is well known to our team. She has a PMH of NICM, HFrEF, LVEF 10%, LVEDD 7.3 cm, s/p ICD, pulmonary hypertension, DM, HTN, permanent AF who presents for consideration for advanced therapies.     She was previously evaluated by our team and presented to committee on 3/9/22. Per the noted, at the time she was declined for OHT or LVAD due to renal function, lung function and difficulty consistently following up with the team.     She has subsequently been seen by cardiology as well as her PCP and pulmonology. Her most recent pulmonology note is from 1 week prior. Based on their interpretation of workup thus far there was no overt evidence of ILD or COPD on PFTs and CT chest so it was unclear as to the reason for the chronic hypoxic and hypercapnic respiratory failure.     She has had 2 admissions for decompensated CHF in the past 2 months, most recent of which was 11/15/22 to 11/22/22.    I saw her as a re-evaluation 2 weeks prior and ordered a risk stratification with CPX and RHC. She returns today for follow up.    Unfortunately, over the past few weeks she says that she has not been doing well.  She endorses a lot of tiredness, and chronic fatigue.  She says that this is especially apparent after taking the hydralazine and isosorbide.  She also notes a more productive cough.  She says that the cough has been ongoing for the past 3 months but has perhaps gotten worse over the past few weeks.  It is productive, usually producing clear/blood-tinged sputum, but occasionally produces green sputum.  She denies any fevers.  Does endorse having some family members with cold symptoms.  Does also have sore throat.  Denies any sinus congestion type symptoms.  Denies any exertional shortness of breath, chest discomfort,  leg swelling, changes in weight, PND, or orthopnea.  She does check her blood pressure at home, says it typically runs 130-140 systolic over 60-70 diastolic.  Over the last few nights however she has also noticed that her oxygen levels have been lower, although typically she is in the 90s, over the past few days she has been in the 80s.    Past Medical History:   Diagnosis Date    Anemia     Anticoagulant long-term use     Arthritis     Atrial fibrillation     CKD (chronic kidney disease), stage IV 5/8/2018    Congestive heart failure     COPD (chronic obstructive pulmonary disease)     Deep vein thrombosis     elevated bilirubin d/t Gilbert's syndrome     confirmed by [place] genetic testing, evaluated by hepatology    Encounter for blood transfusion     GERD (gastroesophageal reflux disease)     Hypertension     Hypertensive cardiovascular-renal disease, stage 1-4 or unspecified chronic kidney disease, with heart failure 3/4/2022    Pheochromocytoma, malignant     Restrictive lung disease 4/26/2022    Right kidney mass     Sleep apnea     Thalassemia trait, alpha     Thyroid disease     Type 2 diabetes mellitus with hyperglycemia, without long-term current use of insulin 8/13/2020     Past Surgical History:   Procedure Laterality Date    APPENDECTOMY      BONE MARROW BIOPSY      CARDIAC DEFIBRILLATOR PLACEMENT Left 12/2016    CARDIAC ELECTROPHYSIOLOGY MAPPING AND ABLATION      CARDIAC ELECTROPHYSIOLOGY MAPPING AND ABLATION      COLONOSCOPY N/A 5/6/2022    Procedure: COLONOSCOPY;  Surgeon: Arely Betancourt MD;  Location: [facility] ([address] [place] [address]);  Service: Endoscopy;  Laterality: N/A;  heart transplant candidate/ EF 25% - 2nd floor/ defib - Biotronik - ERW  Eliquis - per Dr. Cortez with [facility], Pt ok to hold Eliquis x 2 days prior-see media tab-outside correspondence dated 12/30/21  - ERW  verbal instructions/portal instructions/email instructions - s    EYE SURGERY      due to running tears    FRACTURE SURGERY Left   "   hand 5th digit    HYSTERECTOMY      KNEE SURGERY Left 2016    hematoma    LIVER BIOPSY  10/24/2018    Minimal steatosis, predominantly macrovesicular, 1%, Minimal nonspecific portal inflammation, no fibrosis. No findings on biopsy to explain elevated bilirubin levels. Could be d/t Gilbert's =?- hemolysis    RIGHT HEART CATHETERIZATION Right 2021    Procedure: INSERTION, CATHETER, RIGHT HEART;  Surgeon: Irving Cardenas MD;  Location: [facility] CATH LAB;  Service: Cardiology;  Laterality: Right;    RIGHT HEART CATHETERIZATION Right 2022    Procedure: INSERTION, CATHETER, RIGHT HEART;  Surgeon: Burke Camilo MD;  Location: [facility] CATH LAB;  Service: Cardiology;  Laterality: Right;    TRANSJUGULAR BIOPSY OF LIVER N/A 10/24/2018    Procedure: BIOPSY, LIVER, TRANSJUGULAR APPROACH;  Surgeon: Carmen Diagnostic Provider;  Location: [facility] OR [address] [place] [address];  Service: Radiology;  Laterality: N/A;     OB History          2    Para   2    Term   2            AB        Living             SAB        IAB        Ectopic        Multiple        Live Births                   Review of Systems   Constitutional: Negative for chills and fever.   HENT:  Negative for hearing loss.    Eyes:  Negative for visual disturbance.   Cardiovascular:  Positive for dyspnea on exertion. Negative for chest pain, irregular heartbeat, leg swelling, orthopnea, palpitations, paroxysmal nocturnal dyspnea and syncope.   Respiratory:  Positive for cough and shortness of breath.    Musculoskeletal:  Negative for muscle weakness.   Gastrointestinal:  Positive for nausea and vomiting. Negative for diarrhea.   Neurological:  Negative for focal weakness.   Psychiatric/Behavioral:  Negative for memory loss.      Objective:   Blood pressure 120/80, pulse 78, height 5' 6" (1.676 m), weight 93.6 kg (206 lb 5.6 oz).body mass index is 33.31 kg/m².  Physical Exam  Constitutional:       Appearance: She is ill-appearing.   HENT:      Head: Atraumatic. "   Eyes:      Extraocular Movements: Extraocular movements intact.   Cardiovascular:      Rate and Rhythm: Normal rate and regular rhythm.      Pulses: Normal pulses.      Heart sounds: Normal heart sounds.      Comments: JVP 12 cm.  Pulmonary:      Breath sounds: Normal breath sounds.   Abdominal:      Palpations: Abdomen is soft.      Tenderness: There is no abdominal tenderness.   Musculoskeletal:         General: Normal range of motion.      Right lower leg: No edema.      Left lower leg: No edema.   Neurological:      General: No focal deficit present.      Mental Status: She is alert and oriented to person, place, and time.       Labs:    Chemistry        Component Value Date/Time     01/03/2023 1419    K 3.7 01/03/2023 1419     01/03/2023 1419    CO2 22 (L) 01/03/2023 1419    BUN 33 (H) 01/03/2023 1419    CREATININE 2.2 (H) 01/03/2023 1419     01/03/2023 1419        Component Value Date/Time    CALCIUM 9.3 01/03/2023 1419    ALKPHOS 47 (L) 01/03/2023 1419    AST 16 01/03/2023 1419    ALT 15 01/03/2023 1419    BILITOT 1.3 (H) 01/03/2023 1419    ESTGFRAFRICA 29.7 (A) 04/26/2022 1305    EGFRNONAA 25.7 (A) 04/26/2022 1305          Magnesium   Date Value Ref Range Status   01/04/2023 2.0 1.6 - 2.6 mg/dL Final     Lab Results   Component Value Date    WBC 2.90 (L) 12/19/2022    HGB 8.4 (L) 12/19/2022    HCT 28.5 (L) 12/19/2022     12/19/2022     Lab Results   Component Value Date    INR 1.0 01/04/2023    INR 1.0 12/19/2022    INR 1.1 11/16/2022     BNP   Date Value Ref Range Status   01/04/2023 997 (H) 0 - 99 pg/mL Final     Comment:     Values of less than 100 pg/ml are consistent with non-CHF populations.   11/15/2022 4,104 (H) 0 - 99 pg/mL Final     Comment:     Values of less than 100 pg/ml are consistent with non-CHF populations.   11/02/2022 264 (H) 0 - 99 pg/mL Final     Comment:     Values of less than 100 pg/ml are consistent with non-CHF populations.     LD   Date Value Ref Range  Status   01/04/2023 282 (H) 110 - 260 U/L Final     Comment:     Results are increased in hemolyzed samples.   02/10/2022 256 110 - 260 U/L Final     Comment:     Results are increased in hemolyzed samples.   12/03/2021 350 (H) 110 - 260 U/L Final     Comment:     Results are increased in hemolyzed samples.     No results found for this or any previous visit.    No results found for this or any previous visit.    CPX 12/19/22  Resting spirometry reveals an FVC = 1.47L which is 44.69% of predicted, an FEV1 of 1.03L, which is 39.09% of predicted and an FEV1/FVC ratio of 70.07%. The MVV = 41.2 L/min, which is 42.35% of predicted.     The respiratory exchange ratio (RER) was 0.87, suggesting poor effort.     The breathing reserve is calculated at 21.36%, which is borderline reduced. Oxygen saturation with exercise  became reduced reaching a ruth ann of 88% from a baseline value of 90%.     The peak VO2 was 10.1 ml/kg/min which is 33.44% of predicted equating to a functional capacity of 2.89 METS indicating severe functional impairment.     The anaerobic threshold was not attained.     The peak VO2 Lean was 12.58 ml/kg of lean body weight/min indicating a poor prognosis in heart failure.     The VE/VCO2 [place] was 48.1. The Resting PetCO2 was 27.0.     Severe functional impairment associated with a borderline reduced breathing reserve, reduced oxygen saturation with exercise, poor effort. These findings are indicative of functional impairment secondary to poor effort.    RHC 12/19/22    RA 13 PA 78/40 (52)  PCWP 26    CO 4.7 l/min  CI 2.3 l/min/m2    PVR 5.5 BOLDEN.    SVR 1333 dynes/cm5.s  BP  131/72     Assessment:      1. Shortness of breath    2. Acute on chronic respiratory failure with hypoxia    3. NICM (nonischemic cardiomyopathy)    4. ICD (implantable cardioverter-defibrillator) in place    5. Chronic combined systolic and diastolic heart failure    6. CKD (chronic kidney disease), stage IV        Plan:      PILAR  HFrEF, LVEF 10%  - Presents for follow up of her CHF evaluation  - Has some high risk features on her CPX and RHC as noted above  - Current GDMT is Metoprolol succinate 100 mg daily, max dose entresto, jardiance, and hydralazine 100 mg TID, Isordil 30 mg TID although she is unsure if she is taking her metoprolol as she might have ran out  - Unfortunately is ill appearing in clinic today. During clinic visit had sever cough producing some clear and blood tinged sputum, followed by several episodes of nausea and vomiting. Unclear if she has a respiratory infection such as PNA or if her CHF has worsened. Also mentions O2 sats in the 80s at home. Recommended she go to ER so they can check her vitals and get CXR to look for PNA vs pulmonary edema  - She did meet with our Pre coordinator during visit, will continue advanced therapies evaluation    Patient is now NYHA III  Recommend 2 gram sodium restriction and 1500cc fluid restriction.  Encourage physical activity with graded exercise program.  Requested patient to weigh themselves daily, and to notify us if their weight increases by more than 3 lbs in 1 day or 5 lbs in 1 week.     Listed for transplant: No    Patient advised that it is recommended that all transplant candidates, and their close contacts and household members receive Covid vaccination.    UNOS Patient Status  Functional Status: 80% - Normal activity with effort: some symptoms of disease  Physical Capacity: No Limitations  Working for Income: No  If no, reason not working: Disability

## 2023-01-04 NOTE — LETTER
January 4, 2023        Krysta Tony  [address]  [facility] [address]  Phone: [phone]  Fax: [phone]             [id number]  [address]  [facility] [address]  Phone: [phone]   Patient: Hafsa Hawley   MR Number: [id number]   YOB: 1965   Date of Visit: 1/4/2023       Dear Dr. Krysta Tony    Thank you for referring Hafsa Hawley to me for evaluation. Attached you will find relevant portions of my assessment and plan of care.    If you have questions, please do not hesitate to call me. I look forward to following Hafsa Hawley along with you.    Sincerely,    Stoney Guardado MD    Enclosure    If you would like to receive this communication electronically, please contact [email or web address] or [phone] to request Pounce Link access.    Pounce Link is a tool which provides read-only access to select patient information with whom you have a relationship. Its easy to use and provides real time access to review your patients record including encounter summaries, notes, results, and demographic information.    If you feel you have received this communication in error or would no longer like to receive these types of communications, please e-mail [email or web address]

## 2023-01-04 NOTE — Clinical Note
Diagnosis: Congestive heart failure, unspecified HF chronicity, unspecified heart failure type [[id number]]   Admitting Provider:: MELANIA SANTOS [[id number]]   Future Attending Provider: MARYJO ANTONY [[id number]]   Reason for IP Medical Treatment  (Clinical interventions that can only be accomplished in the IP setting? ) :: CHF   Estimated Length of Stay:: 3-4 midnights   I certify that Inpatient services for greater than or equal to 2 midnights are medically necessary:: Yes   Plans for Post-Acute care--if anticipated (pick the single best option):: A. No post acute care anticipated at this time   Special Needs:: No Special Needs [1]

## 2023-01-04 NOTE — ED TRIAGE NOTES
58 y/o F presents to ER with c/c fluid around lungs. Pt states that she was at a scheduled meeting with heart transplant physician who advised her to come to ER due to having fluid around the lungs. Pt endorses N/V, productive cough, weakness, SOB upon exertion. Pt denies c/p, fevers and chills. Pt is on organ transplant list.

## 2023-01-04 NOTE — PROGRESS NOTES
Hafsa Hawley arrived at her clinic visit as part of Heart/Kidney evaluation today, and began coughing and gagging during examination- Dr Guardado asked me to bring her to the ER for an xray for PNA vs Pulm edema

## 2023-01-04 NOTE — ED NOTES
"Patient identifiers for Hafsa Hawley 57 y.o. female checked and correct.  Chief Complaint   Patient presents with    Vomiting     Arrived with heart transplant clinic staff for vomiting, pt states have been feeling bad for months, being worked up for a heart transplant     Past Medical History:   Diagnosis Date    Anemia     Anticoagulant long-term use     Arthritis     Atrial fibrillation     CKD (chronic kidney disease), stage IV 5/8/2018    Congestive heart failure     COPD (chronic obstructive pulmonary disease)     Deep vein thrombosis     elevated bilirubin d/t Gilbert's syndrome     confirmed by [place] genetic testing, evaluated by hepatology    Encounter for blood transfusion     GERD (gastroesophageal reflux disease)     Hypertension     Hypertensive cardiovascular-renal disease, stage 1-4 or unspecified chronic kidney disease, with heart failure 3/4/2022    Pheochromocytoma, malignant     Restrictive lung disease 4/26/2022    Right kidney mass     Sleep apnea     Thalassemia trait, alpha     Thyroid disease     Type 2 diabetes mellitus with hyperglycemia, without long-term current use of insulin 8/13/2020     Allergies reported:   Review of patient's allergies indicates:   Allergen Reactions    Penicillins Hives and Other (See Comments)    Iodinated contrast media Nausea And Vomiting    Oxycodone-acetaminophen Other (See Comments)     Nausea, Dizziness, Anxiety.  "I don't like how it makes me feel."   Given Hydromorphone 0.5mg IVP  Without problems.  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   Other drugs that have been " demonstrated to be impacted by homozygosity for the UGT1A1 TA7 promoter variant include pazopanib, nilotinib, atazanavir, and belinostat. Metabolism of other drugs not listed here may also be impacted by UGT1A1 enzyme activity.       Tramadol Nausea And Vomiting and Other (See Comments)     Other reaction(s): Other (See Comments)    Valsartan Other (See Comments)         LOC: Patient is awake, alert, and aware of environment with an appropriate affect. Patient is oriented x 4 and speaking appropriately.  APPEARANCE: Patient resting comfortably and in no acute distress. Patient is clean and well groomed, patient's clothing is properly fastened.  HEENT: - JVD, + midline trach  SKIN: The skin is warm and dry. Patient has normal skin turgor and moist mucus membranes.   MUSKULOSKELETAL: Patient is moving all extremities well, no obvious deformities noted. Pulses intact. PMS x 4  RESPIRATORY: Airway is open and patent. Respirations are spontaneous and non-labored with normal effort and rate. Endorses productive cough, denies SOB.  CARDIAC: Patient has a normal rate and rhythm.  81 on cardiac monitor. No peripheral edema noted. Denies c/p. + 2 bilateral pedal and radial pulses, < 3 s cap refill  ABDOMEN: Soft and non-tender upon palpation. Pt endorses swelling of abd for past couple months  NEUROLOGICAL: pupils 4 mm, PERRL. Facial expression is symmetrical. Hand grasps are equal bilaterally. Normal sensation in all extremities when touched with finger.

## 2023-01-04 NOTE — ASSESSMENT & PLAN NOTE
NYHA III, ACC stage D  NIC      Echo 11/15/22  · The left ventricle is severely enlarged with eccentric hypertrophy and severely decreased systolic function. The estimated ejection fraction is 10%.  · The right ventricle is not well visualized but appears normal in size with moderately reduced systolic function.  · Grade II left ventricular diastolic dysfunction.  · Biatrial enlargement.  · The estimated PA systolic pressure is 65 mmHg.  · Elevated central venous pressure (15 mmHg).  · Small posterolateral pericardial effusion.       Home GDMT/Diuretic:  - Beta Blocker: Metoprolol succinate   - ACEi/ARB/ARNi: Max dose Entresto  - MRA: precluded 2/2 CKDIII  - Isosorbide/Hydralazine  - SGLT2: Jardiance   - Diuretic: PO Lasix 80 BID    CXR Improved from previous  BNP 900s  EKG narrow complex with frequent ventricular ectopy    Plan:  - HTS consulted, appreciate recs  - Continue indicated GDMT as tolerated  - Daily weights (standing if tolerated)  - Strict I/Os  - Fluid restriction at 1500mL  - Cardiac diet

## 2023-01-05 ENCOUNTER — DOCUMENTATION ONLY (OUTPATIENT)
Dept: ELECTROPHYSIOLOGY | Facility: CLINIC | Age: 58
End: 2023-01-05
Payer: MEDICAID

## 2023-01-05 LAB
ALBUMIN SERPL BCP-MCNC: 3.6 G/DL (ref 3.5–5.2)
ALP SERPL-CCNC: 44 U/L (ref 55–135)
ALT SERPL W/O P-5'-P-CCNC: 11 U/L (ref 10–44)
ANION GAP SERPL CALC-SCNC: 10 MMOL/L (ref 8–16)
ANISOCYTOSIS BLD QL SMEAR: ABNORMAL
AST SERPL-CCNC: 22 U/L (ref 10–40)
BASOPHILS # BLD AUTO: 0.02 K/UL (ref 0–0.2)
BASOPHILS NFR BLD: 0.6 % (ref 0–1.9)
BILIRUB SERPL-MCNC: 1.5 MG/DL (ref 0.1–1)
BUN SERPL-MCNC: 26 MG/DL (ref 6–20)
CALCIUM SERPL-MCNC: 8.8 MG/DL (ref 8.7–10.5)
CHLORIDE SERPL-SCNC: 109 MMOL/L (ref 95–110)
CO2 SERPL-SCNC: 24 MMOL/L (ref 23–29)
CREAT SERPL-MCNC: 1.9 MG/DL (ref 0.5–1.4)
DIFFERENTIAL METHOD: ABNORMAL
EOSINOPHIL # BLD AUTO: 0.1 K/UL (ref 0–0.5)
EOSINOPHIL NFR BLD: 1.9 % (ref 0–8)
ERYTHROCYTE [DISTWIDTH] IN BLOOD BY AUTOMATED COUNT: 29.4 % (ref 11.5–14.5)
EST. GFR  (NO RACE VARIABLE): 30.4 ML/MIN/1.73 M^2
GAMMA INTERFERON BACKGROUND BLD IA-ACNC: 0.04 IU/ML
GLUCOSE SERPL-MCNC: 74 MG/DL (ref 70–110)
HCT VFR BLD AUTO: 31.2 % (ref 37–48.5)
HGB BLD-MCNC: 8.7 G/DL (ref 12–16)
HYPOCHROMIA BLD QL SMEAR: ABNORMAL
IMM GRANULOCYTES # BLD AUTO: 0.01 K/UL (ref 0–0.04)
IMM GRANULOCYTES NFR BLD AUTO: 0.3 % (ref 0–0.5)
LYMPHOCYTES # BLD AUTO: 0.7 K/UL (ref 1–4.8)
LYMPHOCYTES NFR BLD: 21.8 % (ref 18–48)
M TB IFN-G CD4+ BCKGRND COR BLD-ACNC: 0.02 IU/ML
MAGNESIUM SERPL-MCNC: 2.1 MG/DL (ref 1.6–2.6)
MCH RBC QN AUTO: 16.6 PG (ref 27–31)
MCHC RBC AUTO-ENTMCNC: 27.9 G/DL (ref 32–36)
MCV RBC AUTO: 60 FL (ref 82–98)
MITOGEN IGNF BCKGRD COR BLD-ACNC: 4.84 IU/ML
MONOCYTES # BLD AUTO: 0.4 K/UL (ref 0.3–1)
MONOCYTES NFR BLD: 12.8 % (ref 4–15)
NEUTROPHILS # BLD AUTO: 2 K/UL (ref 1.8–7.7)
NEUTROPHILS NFR BLD: 62.6 % (ref 38–73)
NRBC BLD-RTO: 0 /100 WBC
OVALOCYTES BLD QL SMEAR: ABNORMAL
PHOSPHATE SERPL-MCNC: 4.7 MG/DL (ref 2.7–4.5)
PLATELET # BLD AUTO: 176 K/UL (ref 150–450)
PMV BLD AUTO: ABNORMAL FL (ref 9.2–12.9)
POCT GLUCOSE: 148 MG/DL (ref 70–110)
POCT GLUCOSE: 179 MG/DL (ref 70–110)
POIKILOCYTOSIS BLD QL SMEAR: ABNORMAL
POLYCHROMASIA BLD QL SMEAR: ABNORMAL
POTASSIUM SERPL-SCNC: 4 MMOL/L (ref 3.5–5.1)
PROT SERPL-MCNC: 6.4 G/DL (ref 6–8.4)
RBC # BLD AUTO: 5.23 M/UL (ref 4–5.4)
SCHISTOCYTES BLD QL SMEAR: ABNORMAL
SODIUM SERPL-SCNC: 143 MMOL/L (ref 136–145)
SPHEROCYTES BLD QL SMEAR: ABNORMAL
STRONGYLOIDES ANTIBODY IGG: NEGATIVE
TARGETS BLD QL SMEAR: ABNORMAL
TB GOLD PLUS: NEGATIVE
TB2 - NIL: 0 IU/ML
WBC # BLD AUTO: 3.12 K/UL (ref 3.9–12.7)

## 2023-01-05 PROCEDURE — 97535 SELF CARE MNGMENT TRAINING: CPT | Mod: NTX

## 2023-01-05 PROCEDURE — 25000242 PHARM REV CODE 250 ALT 637 W/ HCPCS: Mod: NTX

## 2023-01-05 PROCEDURE — 85025 COMPLETE CBC W/AUTO DIFF WBC: CPT | Mod: NTX

## 2023-01-05 PROCEDURE — 94640 AIRWAY INHALATION TREATMENT: CPT | Mod: NTX

## 2023-01-05 PROCEDURE — 12000002 HC ACUTE/MED SURGE SEMI-PRIVATE ROOM: Mod: NTX

## 2023-01-05 PROCEDURE — 25000003 PHARM REV CODE 250: Mod: NTX

## 2023-01-05 PROCEDURE — 94761 N-INVAS EAR/PLS OXIMETRY MLT: CPT | Mod: NTX

## 2023-01-05 PROCEDURE — 97165 OT EVAL LOW COMPLEX 30 MIN: CPT | Mod: NTX

## 2023-01-05 PROCEDURE — 97162 PT EVAL MOD COMPLEX 30 MIN: CPT | Mod: NTX

## 2023-01-05 PROCEDURE — 83735 ASSAY OF MAGNESIUM: CPT | Mod: NTX

## 2023-01-05 PROCEDURE — 99900035 HC TECH TIME PER 15 MIN (STAT): Mod: NTX

## 2023-01-05 PROCEDURE — 63600175 PHARM REV CODE 636 W HCPCS: Mod: NTX

## 2023-01-05 PROCEDURE — 99232 SBSQ HOSP IP/OBS MODERATE 35: CPT | Mod: NTX,,, | Performed by: STUDENT IN AN ORGANIZED HEALTH CARE EDUCATION/TRAINING PROGRAM

## 2023-01-05 PROCEDURE — 84100 ASSAY OF PHOSPHORUS: CPT | Mod: NTX

## 2023-01-05 PROCEDURE — 99232 PR SUBSEQUENT HOSPITAL CARE,LEVL II: ICD-10-PCS | Mod: NTX,,, | Performed by: STUDENT IN AN ORGANIZED HEALTH CARE EDUCATION/TRAINING PROGRAM

## 2023-01-05 PROCEDURE — 80053 COMPREHEN METABOLIC PANEL: CPT | Mod: NTX

## 2023-01-05 PROCEDURE — 97116 GAIT TRAINING THERAPY: CPT | Mod: NTX

## 2023-01-05 PROCEDURE — 27000221 HC OXYGEN, UP TO 24 HOURS: Mod: NTX

## 2023-01-05 RX ORDER — SCOLOPAMINE TRANSDERMAL SYSTEM 1 MG/1
1 PATCH, EXTENDED RELEASE TRANSDERMAL
COMMUNITY
End: 2023-01-13 | Stop reason: SDUPTHER

## 2023-01-05 RX ORDER — ONDANSETRON 4 MG/1
4 TABLET, ORALLY DISINTEGRATING ORAL 2 TIMES DAILY
Status: DISCONTINUED | OUTPATIENT
Start: 2023-01-05 | End: 2023-01-08 | Stop reason: HOSPADM

## 2023-01-05 RX ORDER — ACETAMINOPHEN 500 MG
1000 TABLET ORAL 2 TIMES DAILY PRN
COMMUNITY
End: 2023-02-17

## 2023-01-05 RX ADMIN — FLUTICASONE FUROATE AND VILANTEROL TRIFENATATE 1 PUFF: 200; 25 POWDER RESPIRATORY (INHALATION) at 11:01

## 2023-01-05 RX ADMIN — MONTELUKAST 10 MG: 10 TABLET, FILM COATED ORAL at 09:01

## 2023-01-05 RX ADMIN — DULOXETIN HYDROCHLORIDE 30 MG: 30 CAPSULE, DELAYED RELEASE ORAL at 09:01

## 2023-01-05 RX ADMIN — ONDANSETRON 4 MG: 4 TABLET, ORALLY DISINTEGRATING ORAL at 08:01

## 2023-01-05 RX ADMIN — FUROSEMIDE 80 MG: 10 INJECTION, SOLUTION INTRAMUSCULAR; INTRAVENOUS at 06:01

## 2023-01-05 RX ADMIN — SACUBITRIL AND VALSARTAN 1 TABLET: 97; 103 TABLET, FILM COATED ORAL at 09:01

## 2023-01-05 RX ADMIN — AMIODARONE HYDROCHLORIDE 200 MG: 200 TABLET ORAL at 09:01

## 2023-01-05 RX ADMIN — ONDANSETRON 4 MG: 4 TABLET, ORALLY DISINTEGRATING ORAL at 09:01

## 2023-01-05 RX ADMIN — ERGOCALCIFEROL 50000 UNITS: 1.25 CAPSULE ORAL at 11:01

## 2023-01-05 RX ADMIN — ISOSORBIDE DINITRATE 30 MG: 10 TABLET ORAL at 09:01

## 2023-01-05 RX ADMIN — POTASSIUM CHLORIDE 10 MEQ: 10 CAPSULE, COATED, EXTENDED RELEASE ORAL at 09:01

## 2023-01-05 RX ADMIN — ISOSORBIDE DINITRATE 30 MG: 10 TABLET ORAL at 08:01

## 2023-01-05 RX ADMIN — FUROSEMIDE 80 MG: 10 INJECTION, SOLUTION INTRAMUSCULAR; INTRAVENOUS at 05:01

## 2023-01-05 RX ADMIN — PANTOPRAZOLE SODIUM 40 MG: 40 TABLET, DELAYED RELEASE ORAL at 09:01

## 2023-01-05 RX ADMIN — APIXABAN 5 MG: 5 TABLET, FILM COATED ORAL at 09:01

## 2023-01-05 RX ADMIN — ATORVASTATIN CALCIUM 40 MG: 20 TABLET, FILM COATED ORAL at 08:01

## 2023-01-05 RX ADMIN — IPRATROPIUM BROMIDE AND ALBUTEROL SULFATE 3 ML: .5; 3 SOLUTION RESPIRATORY (INHALATION) at 09:01

## 2023-01-05 RX ADMIN — IPRATROPIUM BROMIDE AND ALBUTEROL SULFATE 3 ML: .5; 3 SOLUTION RESPIRATORY (INHALATION) at 07:01

## 2023-01-05 RX ADMIN — ACETAMINOPHEN 650 MG: 325 TABLET ORAL at 05:01

## 2023-01-05 RX ADMIN — SACUBITRIL AND VALSARTAN 1 TABLET: 97; 103 TABLET, FILM COATED ORAL at 08:01

## 2023-01-05 RX ADMIN — ALLOPURINOL 100 MG: 100 TABLET ORAL at 09:01

## 2023-01-05 RX ADMIN — METOPROLOL SUCCINATE 100 MG: 50 TABLET, EXTENDED RELEASE ORAL at 09:01

## 2023-01-05 RX ADMIN — APIXABAN 5 MG: 5 TABLET, FILM COATED ORAL at 08:01

## 2023-01-05 NOTE — ASSESSMENT & PLAN NOTE
NYHA III, ACC stage D  NIC      Echo 11/15/22  · The left ventricle is severely enlarged with eccentric hypertrophy and severely decreased systolic function. The estimated ejection fraction is 10%.  · The right ventricle is not well visualized but appears normal in size with moderately reduced systolic function.  · Grade II left ventricular diastolic dysfunction.  · Biatrial enlargement.  · The estimated PA systolic pressure is 65 mmHg.  · Elevated central venous pressure (15 mmHg).  · Small posterolateral pericardial effusion.       Home GDMT/Diuretic:  - Beta Blocker: Metoprolol succinate   - ACEi/ARB/ARNi: Max dose Entresto  - MRA: precluded 2/2 CKDIII  - Isosorbide/Hydralazine  - SGLT2: Jardiance   - Diuretic: PO Lasix 80 BID    CXR Improved from previous  BNP 900s  EKG narrow complex with frequent ventricular ectopy    Plan:  - Cardiac Device interrogation ordered  - HTS consulted, appreciate recs  - Continue indicated GDMT as tolerated  - Daily weights (standing if tolerated)  - Strict I/Os  - Fluid restriction at 1500mL  - Cardiac diet

## 2023-01-05 NOTE — PLAN OF CARE
Problem: Occupational Therapy  Goal: Occupational Therapy Goal  Description: Goals set on 1/5 with expiration date 1/15:  Patient will increase functional independence with ADLs by performing:    Supine <> Sit with [place] adhering to sternal precautions to prepare for potential advanced heart options.   Grooming while standing at sink with Mod [place].  UB Dressing with Mod [place].  LB Dressing with Mod [place].  Step transfer with Mod [place] with DME as needed.  Pt will demonstrate understanding of education provided regarding energy conservation and task modification through teach-back method.   Pt will demonstrate understanding of practice in education provided regarding sternal precautions and application to functional tasks / ADLs reviewed:  including no lifting > 5 lbs, pulling or pushing with BUEs; Modifying daily activities and functional mobility tasks to maintain sternal precautions appropriately       Outcome: Ongoing, Progressing

## 2023-01-05 NOTE — ASSESSMENT & PLAN NOTE
Cr at baseline of 1.7    No indications for HD at this time    Plan:  - Trend Cr  - Strict I&Os  - Avoid nephrotoxic agents  - Renally adjust medications

## 2023-01-05 NOTE — PLAN OF CARE
Problem: Physical Therapy  Goal: Physical Therapy Goal  Description: Goals to met by 1/19/2023    1. Bed to chair transfer with Supervision using No Assistive Device  2. Gait  x 150 feet with Supervision using No Assistive Device   3. Ascend/descend 5 stair(s) with right Handrails Stand-by Assistance using No Assistive Device.   4. Stand for 10 minutes with Supervision using No Assistive Device  5. Lower extremity exercise program x15 reps per Instruction, with assistance as needed in order to facilitate improved strength, improved postural control, and improvement in functional independence    PT Eval: 1/5/2023

## 2023-01-05 NOTE — PT/OT/SLP EVAL
"Occupational Therapy   Co-Evaluation and Treatment   Co-treat with PT due to medical complexity of pt and need for skilled hands for safe intervention.    Name: Hafsa Hawley  MRN: [id number]  Admitting Diagnosis:  Acute on chronic respiratory failure with hypoxia    Length of Stay: 1 days    Recommendations:     Discharge Recommendations: home health OT  Discharge Equipment Recommendations:  none  Barriers to discharge:  None    Plan:     Patient to be seen 3 x/week to address the above listed problems via self-care/home management, therapeutic activities, therapeutic exercises  Plan of Care Expires: 23  Plan of Care Reviewed with: patient    Assessment:     Hafsa Hawley is a 57 y.o. female with a medical diagnosis of Acute on chronic respiratory failure with hypoxia.  She presents with the following performance deficits affecting function: weakness, impaired endurance, impaired self care skills, impaired functional mobility, gait instability, impaired cardiopulmonary response to activity.      Rehab Prognosis: Good; patient would benefit from acute skilled OT services to address these deficits and reach maximum level of function.       Subjective   Communicated with: RN prior to session.  Patient found HOB elevated with oxygen upon OT entry to room.    Chief Complaint: Vomiting (Arrived with heart transplant clinic staff for vomiting, pt states have been feeling bad for months, being worked up for a heart transplant)    Patient/Family Comments/goals: tearful, stating "so many of my family have  from heart things, and now i'm here. I know I want to do better, but I get so tired"     Pain/Comfort:  Pain Rating 1: 0/10  Pain Rating Post-Intervention 1: 0/10    Patients cultural, spiritual, Methodist conflicts given the current situation: no    Occupational Profile:  Living Environment: pt lives w spouse in mobile home with 5STE, L HR installed by family (after near fall); tubshower w shower chair. " "  Prior Level of Function: Patient reports being Mod I uses RW PRN with mobility & with ADLs. Pt endorses she experienced signfiicant decline in Prior level of functioning in past year 2* increased debility and fatigue w minimal exertion. Pt states she doesn't leave home much, tearful in sharing decline in status.   Patient uses DME as follows: walker, rolling, shower chair.   DME owned (not currently used): none.  Roles/Repsonsibilities:   Hand Dominance: right   Work: no.   Drive: yes.   Managing Medicines/Managing Home: yes.   Hobbies: enjoys spending time with family and grandchildren. Loves to spend time in community, but has experienced decline .  Equipment Used at Home:  walker, rolling, shower chair    Patient reports they will have assistance from family  upon discharge.      Objective:     Patient found with: oxygen   General Precautions: Standard, Cardiac fall   Orthopedic Precautions:N/A   Braces: N/A   Respiratory Status:   Nasal cannula, flow 3 L/min  Vitals: BP (!) 149/76   Pulse 77   Temp 98.4 °F (36.9 °C) (Oral)   Resp 18   Ht 5' 6" (1.676 m)   Wt 95.3 kg (210 lb)   LMP  (LMP Unknown)   SpO2 96%   BMI 33.89 kg/m²     Cognitive and Psychosocial Function:   AxOx4 --    Follows Commands/attention:follows two-step commands  Communication:  clear/fluent  Memory: No Deficits noted  Safety awareness/insight to disability: intact   Mood/Affect/Coping skills/emotional control: Appropriate to situation, Cooperative, Pleasant, and appropriately tearful and emotional in sharing about current difficulties     Hearing: Intact    Vision:  Intact visual fields; states she has experienced increased swelling in eyes, watering. Eye fatigue.     Physical Exam:  Postural examination/scapula alignment:    -       Rounded shoulders  -       Forward head  Skin integrity: Visible skin intact    BUE WFL for strength, sensation, GM/FM for use during functional tasks.  Pt is R handed.    Occupational Performance:  Bed " Mobility:    Supervision exiting bed    Functional Mobility/Transfers:  NOTE: Education provided re potential advanced options, specifically Sternal precautions and application to functional tasks / ADLs reviewed:  including no lifting > 5 lbs, pulling or pushing with BUEs; Modifying daily activities and functional mobility tasks to maintain sternal precautions appropriately; Importance of participation in therapy and engaging in increased OOB mobility with assistance to improve endurance    Static Sitting EOB: supervsiion  Dynamic Sitting EOB: supervision   Patient completed Sit <> Stand Transfer with stand by assistance  with  no assistive device and practice of sternal prec. X3 reps     Static Standing Balance: supervision completing ADLs at sink  Dynamic Standing Balance: supervision no AD  Patient completed Toilet Transfer Step Transfer technique with supervision with  no AD  Functional mobility: Pt ambulated bathroom and household distance with supervision no AD with no rest breaks required    Activities of Daily Living:  Feeding:  independence    Grooming: supervision at sink in standing   Upper Body Dressing: modified independence set up seated EOB  Lower Body Dressing: modified independence set up seated EOB  Toileting: independence at toilet level      AMPA 6 Click ADL:  AMPA Total Score: 22    Treatment & Education:  -Sternal precautions and application to functional tasks / ADLs reviewed:  including no lifting > 5 lbs, pulling or pushing with BUEs; Modifying daily activities and functional mobility tasks to maintain sternal precautions appropriately; Importance of participation in therapy and engaging in increased OOB mobility with assistance to improve endurance  -OT POC, safety during ADLs and mobility   -Education on energy conservation and task modification to maximize safety and independence  -Questions answered within OT scope of practice.      Patient left with bed in chair position with all  lines intact, call button in reach, and RN notified    GOALS:   Multidisciplinary Problems       Occupational Therapy Goals          Problem: Occupational Therapy    Goal Priority Disciplines Outcome Interventions   Occupational Therapy Goal     OT, PT/OT Ongoing, Progressing    Description: Goals set on 1/5 with expiration date 1/15:  Patient will increase functional independence with ADLs by performing:    Supine <> Sit with Geraldine adhering to sternal precautions to prepare for potential advanced heart options.   Grooming while standing at sink with Mod Geraldine.  UB Dressing with Mod Geraldine.  LB Dressing with Mod Geraldine.  Step transfer with Mod Geraldine with DME as needed.  Pt will demonstrate understanding of education provided regarding energy conservation and task modification through teach-back method.   Pt will demonstrate understanding of practice in education provided regarding sternal precautions and application to functional tasks / ADLs reviewed:  including no lifting > 5 lbs, pulling or pushing with BUEs; Modifying daily activities and functional mobility tasks to maintain sternal precautions appropriately                            History:     Past Medical History:   Diagnosis Date    Anemia     Anticoagulant long-term use     Arthritis     Atrial fibrillation     CKD (chronic kidney disease), stage IV 5/8/2018    Congestive heart failure     COPD (chronic obstructive pulmonary disease)     Deep vein thrombosis     elevated bilirubin d/t Gilbert's syndrome     confirmed by Welsh genetic testing, evaluated by hepatology    Encounter for blood transfusion     GERD (gastroesophageal reflux disease)     Hypertension     Hypertensive cardiovascular-renal disease, stage 1-4 or unspecified chronic kidney disease, with heart failure 3/4/2022    Pheochromocytoma, malignant     Restrictive lung disease 4/26/2022    Right kidney mass     Sleep apnea     Thalassemia trait, alpha      Thyroid disease     Type 2 diabetes mellitus with hyperglycemia, without long-term current use of insulin 8/13/2020         Past Surgical History:   Procedure Laterality Date    APPENDECTOMY      BONE MARROW BIOPSY      CARDIAC DEFIBRILLATOR PLACEMENT Left 12/2016    CARDIAC ELECTROPHYSIOLOGY MAPPING AND ABLATION      CARDIAC ELECTROPHYSIOLOGY MAPPING AND ABLATION      COLONOSCOPY N/A 5/6/2022    Procedure: COLONOSCOPY;  Surgeon: Arely Betancourt MD;  Location: [facility] (2ND [facility]);  Service: Endoscopy;  Laterality: N/A;  heart transplant candidate/ EF 25% - 2nd floor/ defib - [employer] - ER[facility]  Eliquis - per Dr. Cortez with [facility] [place], Pt ok to hold Eliquis x 2 days prior-see media tab-outside correspondence dated 12/30/21  - [facility]  verbal instructions/portal instructions/email instructions - s    EYE SURGERY      due to running tears    FRACTURE SURGERY Left     hand 5th digit    HYSTERECTOMY      KNEE SURGERY Left 2016    hematoma    LIVER BIOPSY  10/24/2018    Minimal steatosis, predominantly macrovesicular, 1%, Minimal nonspecific portal inflammation, no fibrosis. No findings on biopsy to explain elevated bilirubin levels. Could be d/t Gilbert's =?- hemolysis    RIGHT HEART CATHETERIZATION Right 12/07/2021    Procedure: INSERTION, CATHETER, RIGHT HEART;  Surgeon: Irving Cardenas MD;  Location: [facility] CATH LAB;  Service: Cardiology;  Laterality: Right;    RIGHT HEART CATHETERIZATION Right 12/19/2022    Procedure: INSERTION, CATHETER, RIGHT HEART;  Surgeon: Burke Camilo MD;  Location: [facility] CATH LAB;  Service: Cardiology;  Laterality: Right;    TRANSJUGULAR BIOPSY OF LIVER N/A 10/24/2018    Procedure: BIOPSY, LIVER, TRANSJUGULAR APPROACH;  Surgeon: Carmen Diagnostic Provider;  [facility];  Service: Radiology;  Laterality: N/A;       Time Tracking:       OT Date of Treatment: 01/05/23  OT Start Time: 0837  OT Stop Time: 0905  OT Total Time (min): 28 min  Additional staff present: PT      Billable  Minutes:Evaluation 10  Self Care/Home Management 18      1/5/2023

## 2023-01-05 NOTE — HOSPITAL COURSE
Admitted to hospital medicine from cardiology clinic for dyspnea and fatigue. Presentation in keeping with CHF, possible progression of disease. She was able to tolerate PO intake without vomiting as well as with improvement of her fatigue after eating. AICD interrogated 1/5. HTS consulted to evaluate patients advanced HF options, awaiting recs. Renal funtion stable with diuresis with patient able to wean supplemental O2 to 2L which she uses PRN at home. Failed 6 minute walk test, will need oxygen all of the time instead of prn. After diuresing 2.9L, patients BNP improved to 300s, and there was a small bump in Cr. Patient transitioned to PO lasix 80 BID. Metoprolol succinate restarted at 50mg. Patient continued to show adequate response to her PO dose of lasix. Stable and medically ready for discharge on 1/8/23 with [facility], nephrology and palliative care referrals as well as outpatient renal function panel to be completed in 1 week.

## 2023-01-05 NOTE — ED NOTES
Patient identifiers verified and correct for Hafsa Hawley  LOC: The patient is awake, alert and aware of environment with an appropriate affect, the patient is oriented x 3 and speaking appropriately.   APPEARANCE: Patient appears comfortable and in no acute distress, patient is clean and well groomed.  SKIN: The skin is warm and dry, color consistent with ethnicity, patient has normal skin turgor and moist mucus membranes, skin intact, no breakdown or bruising noted.   MUSCULOSKELETAL: Patient moving all extremities spontaneously, no swelling noted.  RESPIRATORY: Airway is open and patent, respirations are spontaneous, patient has a normal effort and rate, no accessory muscle use noted, O2 sats noted at 96% on room air.  CARDIAC: Patient has a normal rate and regular rhythm, no edema noted, capillary refill < 3 seconds.   GASTRO: Soft and non tender to palpation, no distention noted, normoactive bowel sounds present in all four quadrants. Pt states bowel movements have been regular with some diarrhea  : Pt denies any pain or frequency with urination.  NEURO: Pt opens eyes spontaneously, behavior appropriate to situation, follows commands, facial expression symmetrical, bilateral hand grasp equal and even, purposeful motor response noted, normal sensation in all extremities when touched with a finger.

## 2023-01-05 NOTE — PHARMACY MED REC
"  Admission Medication History     The home medication history was taken by Nataly Mccracken.    You may go to "Admission" then "Reconcile Home Medications" tabs to review and/or act upon these items.     The home medication list has been updated by the Pharmacy department.   Please read ALL comments highlighted in yellow.   Please address this information as you see fit.    Feel free to contact us if you have any questions or require assistance.      The medications listed below were removed from the home medication list. Please reorder if appropriate:  Patient reports no longer taking the following medication(s):  METOCLOPRAMIDE HCL 5 MG  METOPROLOL SUCCINATE 100 MG   MONTELUKAST 10 MG  POLYETHYLENE GLYCOL 17 GRAM  ROPINIROLE 4 MG    Medications listed below were obtained from: Patient/family    Current Outpatient Medications on File Prior to Encounter   Medication Sig    acetaminophen (TYLENOL) 500 MG tablet   Take 1,000 mg by mouth 2 (two) times daily as needed for Pain.    albuterol-ipratropium (DUO-NEB) 2.5 mg-0.5 mg/3 mL nebulizer solution   Take 3 mLs by nebulization 2 (two) times daily as needed for Wheezing or Shortness of Breath.    allopurinoL (ZYLOPRIM) 100 MG tablet   Take 100 mg by mouth daily as needed (gout).    ALPRAZolam (XANAX) 2 MG Tab   Take 0.25-2 mg by mouth 2 (two) times daily as needed (anxiety).    amiodarone (PACERONE) 200 MG Tab   Take 1 tablet (200 mg total) by mouth once daily.    apixaban (ELIQUIS) 5 mg Tab     Take 1 tablet (5 mg total) by mouth 2 (two) times daily.    atorvastatin (LIPITOR) 40 MG tablet   Take 1 tablet (40 mg total) by mouth every evening.    b complex vitamins tablet   Take 1 tablet by mouth once daily.    diclofenac sodium (VOLTAREN) 1 % Gel   APPLY 2 G TOPICALLY 3 (THREE) TIMES DAILY AS NEEDED (PAIN).    empagliflozin (JARDIANCE) 25 mg tablet   Take 25 mg by mouth once daily.    ergocalciferol (ERGOCALCIFEROL) 50,000 unit Cap   Take 50,000 Units by mouth every " Saturday.    ferrous sulfate 325 (65 FE) MG EC tablet   Take 325 mg by mouth 2 (two) times daily.    fluticasone propionate (FLONASE) 50 mcg/actuation nasal spray   2 sprays by Each Nostril route daily as needed for Rhinitis.     furosemide (LASIX) 80 MG tablet   Take 80 mg by mouth 2 (two) times daily as needed (edema).    glimepiride (AMARYL) 2 MG tablet   TAKE 1 TABLET BY MOUTH EVERY DAY WITH BREAKFAST    hydrALAZINE (APRESOLINE) 100 MG tablet   Take 1 tablet (100 mg total) by mouth 3 (three) times daily.    isosorbide dinitrate (ISORDIL) 30 MG Tab   Take 1 tablet (30 mg total) by mouth 3 (three) times daily.    LIDOcaine (LIDODERM) 5 %       Place 1 patch onto the skin daily as needed (pain). Remove & Discard patch within 12 hours or as directed by MD    mometasone-formoterol (DULERA) 200-5 mcg/actuation inhaler   Inhale 2 puffs into the lungs 2 (two) times daily. Controller    mv,Ca,min/iron/FA/guarana/caff (ONE-A-DAY WOMEN'S ACTIVE ORAL)   Take 1 tablet by mouth once daily.    NITROSTAT 0.4 mg SL tablet       Take 2.5 tablets (1 mg total) by mouth every 5 (five) minutes as needed for Chest pain. No more than 3 tablets in 15 minutes.    ondansetron (ZOFRAN-ODT) 4 MG TbDL   Take 4 mg by mouth every 12 (twelve) hours as needed (nausea).    pantoprazole (PROTONIX) 40 MG tablet   TAKE 1 TABLET BY MOUTH DAILY IN THE MORNING    potassium chloride (MICRO-K) 10 MEQ CpSR   Take 1 capsule (10 mEq total) by mouth once daily.    promethazine-codeine 6.25-10 mg/5 ml (PHENERGAN WITH CODEINE) 6.25-10 mg/5 mL syrup   Take 5 mLs by mouth every 4 to 6 hours as needed for Cough.    sacubitriL-valsartan (ENTRESTO)  mg per tablet   Take 1 tablet by mouth 2 (two) times daily.    scopolamine (TRANSDERM-SCOP) 1.3-1.5 mg (1 mg over 3 days)   Place 1 patch onto the skin every 72 hours as needed (nausea).    SPIRIVA WITH HANDIHALER 18 mcg inhalation capsule   INHALE 1 CAPSULE INTO THE LUNGS ONCE DAILY.    VENTOLIN HFA 90  mcg/actuation inhaler   Inhale 2 puffs into the lungs every 6 (six) hours as needed for Shortness of Breath or Wheezing.    blood sugar diagnostic (ACCU-CHEK GUIDE TEST STRIPS) Strp   1 strip by Other route 3 (three) times daily.    cetirizine (ZYRTEC) 10 MG tablet   Take 10 mg by mouth daily as needed for Allergies.    DULoxetine (CYMBALTA) 30 MG capsule   Take 1 capsule orally once a day.    lancets (ACCU-CHEK SOFTCLIX LANCETS) Misc 1 Device by Misc.(Non-Drug; Combo Route) route 3 (three) times daily.             Potential issues to be addressed PRIOR TO DISCHARGE  Patient reported not taking the following medications: (DULOXETINE 30 MG). These medications remain on the home medication list. Please address accordingly.   Please discuss with the patient barriers to adherence with medication treatment plans  Patient requested refills for the following medications: (CETIRIZINE 10 MG)    Nataly Mccracken  [id number] [id number]                .

## 2023-01-05 NOTE — PROGRESS NOTES
Cardiac device interrogation and/or reprogramming completed by industry representative, Brien with [employer];  please refer to report located in the Media tab.

## 2023-01-05 NOTE — NURSING
POC reviewed with patient. All questions and concerns addressed. Fall/safety precautions implemented and maintained. IV lasix administered. Strict I & O's. Pain management provided d/t RLS and bilateral hip pain. Blood glucose monitored. No acute events noted this shift. Please see flowsheet for full assessment and vitals. Bed locked in lowest position. Side rails up x2. Call bell within reach. Will continue to monitor.

## 2023-01-05 NOTE — H&P
[facility] - Emergency Dept  [facility] Medicine  History & Physical    Patient Name: Hafsa Hawley  MRN: [id number]  Patient Class: IP- Inpatient  Admission Date: 1/4/2023  Attending Physician: Barak Barajas MD   Primary Care Provider: Krysta Tony MD         Patient information was obtained from patient, past medical records and ER records.     Subjective:     Principal Problem:Acute on chronic respiratory failure with hypoxia    Chief Complaint:   Chief Complaint   Patient presents with    Vomiting     Arrived with heart transplant clinic staff for vomiting, pt states have been feeling bad for months, being worked up for a heart transplant        HPI: 58yo F with PMHx of NICM, HFrEF (LVEF 10%), s/p ICD, pulmonary hypertension, DM, HTN, pAF who presents after she was sent in by [facility] clinic after experiencing a coughing paroxysm followed by nausea and vomiting. She reports that she has been experiencing persistent cough for x1 year with recent gradual worsening with intermittent white/pink sputum production. She also reports gradually worsening of her LAGUERRE that has no progressed to dyspnea at rest. She also reports orthostatic dizziness and near syncope. She reports the sensation of irregular breathing while awake and intermittent PND. She denies any new or acutely worsened symptoms from her chronic HF symtpoms but notes a gradual worsening. She has had 2 admissions for decompensated CHF in the past 2 months, most recent of which was 11/15/22 to 11/22/22. She states that these exacerbations presented as wheezing, CP and respiratory distress, and notes that todays episode was not similar. She states that she was recently placed on Isosorbide/hydralazine with improvement in her CP. She has been evaluated by [facility] for advanced options including transplant or LVAD and is currently being evaluated by transplant nephro as well as pulmonary for PH. PFTs negative for COPD, ILD. Last RHC 12/19/22.       ED  Course:  In the ED patient was afebrile, /74, HR 72 and satting 94%  on 2L. Initial labs notable for BNP 900s, trop 0.710, WBC 3.22, HGB 9, CO2 22. EKG showed NSR with frequent PVCs, prolonged Qtc but no acute changes from previous. CXR showed improvement of vascular congestion compared to previous. She was admitted to [facility] with consult to [facility] for further management        Past Medical History:   Diagnosis Date    Anemia     Anticoagulant long-term use     Arthritis     Atrial fibrillation     CKD (chronic kidney disease), stage IV 5/8/2018    Congestive heart failure     COPD (chronic obstructive pulmonary disease)     Deep vein thrombosis     elevated bilirubin d/t Gilbert's syndrome     confirmed by [facility] genetic testing, evaluated by hepatology    Encounter for blood transfusion     GERD (gastroesophageal reflux disease)     Hypertension     Hypertensive cardiovascular-renal disease, stage 1-4 or unspecified chronic kidney disease, with heart failure 3/4/2022    Pheochromocytoma, malignant     Restrictive lung disease 4/26/2022    Right kidney mass     Sleep apnea     Thalassemia trait, alpha     Thyroid disease     Type 2 diabetes mellitus with hyperglycemia, without long-term current use of insulin 8/13/2020       Past Surgical History:   Procedure Laterality Date    APPENDECTOMY      BONE MARROW BIOPSY      CARDIAC DEFIBRILLATOR PLACEMENT Left 12/2016    CARDIAC ELECTROPHYSIOLOGY MAPPING AND ABLATION      CARDIAC ELECTROPHYSIOLOGY MAPPING AND ABLATION      COLONOSCOPY N/A 5/6/2022    Procedure: COLONOSCOPY;  Surgeon: Arely Betancourt MD;  Location: [facility] ([address] [place] [address]);  Service: Endoscopy;  Laterality: N/A;  heart transplant candidate/ EF 25% - 2nd floor/ defib - Biotronik - ERW  Eliquis - per Dr. Cortez with [facility], Pt ok to hold Eliquis x 2 days prior-see media tab-outside correspondence dated 12/30/21  - ERW  verbal instructions/portal instructions/email instructions - s  "   EYE SURGERY      due to running tears    FRACTURE SURGERY Left     hand 5th digit    HYSTERECTOMY      KNEE SURGERY Left 2016    hematoma    LIVER BIOPSY  10/24/2018    Minimal steatosis, predominantly macrovesicular, 1%, Minimal nonspecific portal inflammation, no fibrosis. No findings on biopsy to explain elevated bilirubin levels. Could be d/t Gilbert's =?- hemolysis    RIGHT HEART CATHETERIZATION Right 12/07/2021    Procedure: INSERTION, CATHETER, RIGHT HEART;  Surgeon: Irving Cardenas MD;  Location: [facility] CATH LAB;  Service: Cardiology;  Laterality: Right;    RIGHT HEART CATHETERIZATION Right 12/19/2022    Procedure: INSERTION, CATHETER, RIGHT HEART;  Surgeon: Burke Camilo MD;  Location: [facility] CATH LAB;  Service: Cardiology;  Laterality: Right;    TRANSJUGULAR BIOPSY OF LIVER N/A 10/24/2018    Procedure: BIOPSY, LIVER, TRANSJUGULAR APPROACH;  Surgeon: Carmen Diagnostic Provider;  Location: [facility] OR [address] [place] [address];  Service: Radiology;  Laterality: N/A;       Review of patient's allergies indicates:   Allergen Reactions    Penicillins Hives and Other (See Comments)    Iodinated contrast media Nausea And Vomiting    Oxycodone-acetaminophen Other (See Comments)     Nausea, Dizziness, Anxiety.  "I don't like how it makes me feel."   Given Hydromorphone 0.5mg IVP  Without problems.  Other reaction(s): Other (See Comments)    Clonazepam Other (See Comments)    Diovan hct [valsartan-hydrochlorothiazide] Other (See Comments)     Causes coughing    Iodine Other (See Comments)    Irinotecan      Pt has homozygosity for the TA7 promoter variant that places this individual at significantly increased risk for   severe neutropenia (grade 4) when treated with the standard dose of irinotecan (risk approximately 50%).   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 Metabolism of other drugs not " listed here may also be impacted by UGT1A1 enzyme activity.       Tramadol Nausea And Vomiting and Other (See Comments)     Other reaction(s): Other (See Comments)    Valsartan Other (See Comments)       Current Facility-Administered Medications on File Prior to Encounter   Medication    0.9%  NaCl infusion    vancomycin in dextrose 5 % 1 gram/250 mL IVPB 1,000 mg     Current Outpatient Medications on File Prior to Encounter   Medication Sig    amiodarone (PACERONE) 200 MG Tab Take 1 tablet (200 mg total) by mouth once daily.    furosemide (LASIX) 80 MG tablet Take 1 tablet (80 mg total) by mouth 2 (two) times a day. Use afternoon dose if needed    hydrALAZINE (APRESOLINE) 100 MG tablet Take 1 tablet (100 mg total) by mouth 3 (three) times daily.    albuterol-ipratropium (DUO-NEB) 2.5 mg-0.5 mg/3 mL nebulizer solution Take 3 mLs by nebulization 2 (two) times a day.    allopurinoL (ZYLOPRIM) 100 MG tablet Take 1 tablet (100 mg total) by mouth once daily.    ALPRAZolam (XANAX) 2 MG Tab Take 2 mg by mouth 2 (two) times daily as needed.    apixaban (ELIQUIS) 5 mg Tab Take 1 tablet (5 mg total) by mouth 2 (two) times daily.    atorvastatin (LIPITOR) 40 MG tablet Take 1 tablet (40 mg total) by mouth every evening.    b complex vitamins tablet Take 1 tablet by mouth once daily.    blood sugar diagnostic (ACCU-CHEK GUIDE TEST STRIPS) Strp 1 strip by Other route 3 (three) times daily.    cetirizine (ZYRTEC) 10 MG tablet Take 10 mg by mouth.    diclofenac sodium (VOLTAREN) 1 % Gel APPLY 2 G TOPICALLY 3 (THREE) TIMES DAILY AS NEEDED (PAIN).    DULoxetine (CYMBALTA) 30 MG capsule Take 1 capsule orally once a day.    empagliflozin (JARDIANCE) 25 mg tablet Jardiance 25 mg tablet    ergocalciferol (ERGOCALCIFEROL) 50,000 unit Cap Take 1 capsule (50,000 Units total) by mouth every 7 days.    ferrous sulfate 325 (65 FE) MG EC tablet Take 1 tablet (325 mg total) by mouth once daily.    fluticasone propionate  (FLONASE) 50 mcg/actuation nasal spray 2 sprays by Each Nostril route daily as needed for Rhinitis.     glimepiride (AMARYL) 2 MG tablet TAKE 1 TABLET BY MOUTH EVERY DAY WITH BREAKFAST    isosorbide dinitrate (ISORDIL) 30 MG Tab Take 1 tablet (30 mg total) by mouth 3 (three) times daily.    lancets (ACCU-CHEK SOFTCLIX LANCETS) Misc 1 Device by Misc.(Non-Drug; Combo Route) route 3 (three) times daily.    LIDOcaine (LIDODERM) 5 % Place 1 patch onto the skin once daily. Remove & Discard patch within 12 hours or as directed by MD    metoprolol succinate (TOPROL-XL) 100 MG 24 hr tablet Take 100 mg by mouth once daily.    mometasone-formoterol (DULERA) 200-5 mcg/actuation inhaler Inhale 2 puffs into the lungs 2 (two) times daily. Controller    montelukast (SINGULAIR) 10 mg tablet Take 1 tablet every day by oral route for 30 days.    NITROSTAT 0.4 mg SL tablet Take 2.5 tablets (1 mg total) by mouth every 5 (five) minutes as needed for Chest pain. No more than 3 tablets in 15 minutes.    ondansetron (ZOFRAN-ODT) 4 MG TbDL Take 1 tablet (4 mg total) by mouth 2 (two) times daily.    pantoprazole (PROTONIX) 40 MG tablet TAKE 1 TABLET BY MOUTH DAILY IN THE MORNING    polyethylene glycol (GLYCOLAX) 17 gram PwPk Take 17 g by mouth 3 (three) times daily as needed (constipation/hard stools).    potassium chloride (MICRO-K) 10 MEQ CpSR Take 1 capsule (10 mEq total) by mouth once daily.    promethazine-codeine 6.25-10 mg/5 ml (PHENERGAN WITH CODEINE) 6.25-10 mg/5 mL syrup Take 5 ml by mouth every 4-6 hours as needed.    rOPINIRole (REQUIP) 4 MG tablet Take 1 tablet (4 mg total) by mouth once daily. Pt taking 4mg daily    sacubitriL-valsartan (ENTRESTO)  mg per tablet Take 1 tablet by mouth 2 (two) times daily.    SPIRIVA WITH HANDIHALER 18 mcg inhalation capsule INHALE 1 CAPSULE INTO THE LUNGS ONCE DAILY.    VENTOLIN HFA 90 mcg/actuation inhaler Inhale 2 puffs into the lungs 2 (two) times daily as needed.      [DISCONTINUED] metoclopramide HCl (REGLAN) 5 MG tablet Take 1 tablet by mouth 2 (two) times daily.     Family History       Problem Relation (Age of Onset)    Cancer Mother    Cirrhosis Brother    Diabetes Brother    Heart attack Father    Heart disease Father, Sister, Brother, Sister, Brother    Hypertension Father, Brother          Tobacco Use    Smoking status: Never    Smokeless tobacco: Never   Substance and Sexual Activity    Alcohol use: Yes     Comment: up to 1 yr ago drank 2-3 drinks on occasion but sporadic    Drug use: No    Sexual activity: Yes     Partners: Male     Review of Systems   Constitutional:  Negative for chills, diaphoresis, fatigue and fever.   HENT:  Negative for congestion, rhinorrhea, sore throat and trouble swallowing.    Respiratory:  Positive for cough and shortness of breath. Negative for chest tightness and wheezing.    Cardiovascular:  Positive for leg swelling. Negative for chest pain and palpitations.   Gastrointestinal:  Positive for nausea and vomiting. Negative for abdominal distention, abdominal pain, blood in stool and diarrhea.   Genitourinary:  Negative for difficulty urinating, dysuria, flank pain and hematuria.   Musculoskeletal:  Negative for arthralgias, back pain and neck pain.   Skin:  Negative for rash and wound.   Neurological:  Negative for dizziness, syncope, weakness, light-headedness, numbness and headaches.   Objective:     Vital Signs (Most Recent):  Temp: 99.1 °F (37.3 °C) (01/04/23 1855)  Pulse: 88 (01/04/23 1942)  Resp: 16 (01/04/23 1942)  BP: 132/83 (01/04/23 1530)  SpO2: 96 % (01/04/23 1942) Vital Signs (24h Range):  Temp:  [98.3 °F (36.8 °C)-99.1 °F (37.3 °C)] 99.1 °F (37.3 °C)  Pulse:  [71-97] 88  Resp:  [14-20] 16  SpO2:  [90 %-99 %] 96 %  BP: (120-158)/(73-94) 132/83     Weight: 95.3 kg (210 lb)  Body mass index is 33.89 kg/m².    Physical Exam  Constitutional:       General: She is not in acute distress.     Appearance: Normal appearance. She is  ill-appearing.   HENT:      Head: Normocephalic and atraumatic.      Nose: Nose normal. No rhinorrhea.      Mouth/Throat:      Mouth: Mucous membranes are moist.      Pharynx: No posterior oropharyngeal erythema.   Eyes:      Extraocular Movements: Extraocular movements intact.      Conjunctiva/sclera: Conjunctivae normal.   Cardiovascular:      Rate and Rhythm: Tachycardia present. Rhythm irregular.      Pulses: Normal pulses.   Pulmonary:      Effort: Pulmonary effort is normal.      Breath sounds: Rales present.   Abdominal:      General: Abdomen is flat.      Palpations: Abdomen is soft.      Tenderness: There is no abdominal tenderness.   Musculoskeletal:         General: No swelling or tenderness. Normal range of motion.      Cervical back: Normal range of motion and neck supple.      Right lower leg: Edema present.      Left lower leg: Edema present.   Skin:     General: Skin is warm and dry.   Neurological:      General: No focal deficit present.      Mental Status: She is alert and oriented to person, place, and time.           Significant Labs: All pertinent labs within the past 24 hours have been reviewed.    Significant Imaging: I have reviewed all pertinent imaging results/findings within the past 24 hours.    Assessment/Plan:     CKD (chronic kidney disease), stage IV  Cr at baseline of 1.7    No indications for HD at this time    Plan:  - Trend Cr  - Strict I&Os  - Avoid nephrotoxic agents  - Renally adjust medications        Chronic combined systolic and diastolic heart failure  NYHA III, ACC stage D  [facility]      Echo 11/15/22  · The left ventricle is severely enlarged with eccentric hypertrophy and severely decreased systolic function. The estimated ejection fraction is 10%.  · The right ventricle is not well visualized but appears normal in size with moderately reduced systolic function.  · Grade II left ventricular diastolic dysfunction.  · Biatrial enlargement.  · The estimated PA systolic pressure  is 65 mmHg.  · Elevated central venous pressure (15 mmHg).  · Small posterolateral pericardial effusion.       Home GDMT/Diuretic:  - Beta Blocker: Metoprolol succinate   - ACEi/ARB/ARNi: Max dose Entresto  - MRA: precluded 2/2 CKDIII  - Isosorbide/Hydralazine  - SGLT2: Jardiance   - Diuretic: PO Lasix 80 BID    CXR Improved from previous  BNP 900s  EKG narrow complex with frequent ventricular ectopy    Plan:  - HTS consulted, appreciate recs  - Continue indicated GDMT as tolerated  - Daily weights (standing if tolerated)  - Strict I/Os  - Fluid restriction at 1500mL  - Cardiac diet    NICM (nonischemic cardiomyopathy)        Paroxysmal atrial fibrillation  Patient with Paroxysmal (<7 days) atrial fibrillation which is controlled currently with Beta Blocker and Amiodarone. Patient is currently in sinus rhythm. Anticoagulation indicated. Anticoagulation done with Eliquis.    Plan:   Continue Metoprolol  Continue Amiodarone      VTE Risk Mitigation (From admission, onward)         Ordered     apixaban tablet 5 mg  2 times daily         01/04/23 1642     Place sequential compression device  Until discontinued         01/04/23 1552     Reason for No Pharmacological VTE Prophylaxis  Once        Question:  Reasons:  Answer:  Already adequately anticoagulated on oral Anticoagulants    01/04/23 1552     IP VTE HIGH RISK PATIENT  Once         01/04/23 1530                   Barak Burnett DO  Department of Hospital Medicine   [facility] - Emergency Dept

## 2023-01-05 NOTE — PROGRESS NOTES
[facility] - Emergency Dept  Hospital Medicine  Progress Note    Patient Name: Hafsa Hawley  MRN: [id number]  Patient Class: IP- Inpatient   Admission Date: 1/4/2023  Length of Stay: 1 days  Attending Physician: Barak Barajas MD  Primary Care Provider: Krysta Tony MD        Subjective:     Principal Problem:Acute on chronic respiratory failure with hypoxia        HPI:  56yo F with PMHx of NICM, HFrEF (LVEF 10%), s/p ICD, pulmonary hypertension, DM, HTN, pAF who presents after she was sent in by [facility] clinic after experiencing a coughing paroxysm followed by nausea and vomiting. She reports that she has been experiencing persistent cough for x1 year with recent gradual worsening with intermittent white/pink sputum production. She also reports gradually worsening of her LAGUERRE that has no progressed to dyspnea at rest. She also reports orthostatic dizziness and near syncope. She reports the sensation of irregular breathing while awake and intermittent PND. She denies any new or acutely worsened symptoms from her chronic HF symtpoms but notes a gradual worsening. She has had 2 admissions for decompensated CHF in the past 2 months, most recent of which was 11/15/22 to 11/22/22. She states that these exacerbations presented as wheezing, CP and respiratory distress, and notes that todays episode was not similar. She states that she was recently placed on Isosorbide/hydralazine with improvement in her CP. She has been evaluated by [facility] for advanced options including transplant or LVAD and is currently being evaluated by transplant nephro as well as pulmonary for PH. PFTs negative for COPD, ILD. Last RHC 12/19/22.       ED Course:  In the ED patient was afebrile, /74, HR 72 and satting 94%  on 2L. Initial labs notable for BNP 900s, trop 0.710, WBC 3.22, HGB 9, CO2 22. EKG showed NSR with frequent PVCs, prolonged Qtc but no acute changes from previous. CXR showed improvement of vascular congestion compared to  previous. She was admitted to [facility] with consult to [facility] for further management        Overview/Hospital Course:  While admitted she was able to tolerate PO intake without vomiting as well as with improvement of her fatigue after eating. AICD interrogated 1/5. [facility] consulted to evaluate patients advanced HF options. Renal funtion stable with diuresis with patient able to wean supplemental O2 to 2L which she uses PRN at home.       Interval History: NAEON. She reports feeling much better after eating last night and breakfast and believes her episode in clinic was a result of fasting for the labs that day. She also reports feeling the sensation of her AICD going off recently but was unsure if this is the case. Will interrogate the device today.     Review of Systems   Constitutional:  Negative for chills, diaphoresis, fatigue and fever.   HENT:  Negative for congestion, rhinorrhea, sore throat and trouble swallowing.    Respiratory:  Positive for cough and shortness of breath. Negative for chest tightness and wheezing.    Cardiovascular:  Positive for leg swelling. Negative for chest pain and palpitations.   Gastrointestinal:  Positive for nausea and vomiting. Negative for abdominal distention, abdominal pain, blood in stool and diarrhea.   Genitourinary:  Negative for difficulty urinating, dysuria, flank pain and hematuria.   Musculoskeletal:  Negative for arthralgias, back pain and neck pain.   Skin:  Negative for rash and wound.   Neurological:  Negative for dizziness, syncope, weakness, light-headedness, numbness and headaches.   Objective:     Vital Signs (Most Recent):  Temp: 98.4 °F (36.9 °C) (01/05/23 1459)  Pulse: (!) 59 (01/05/23 1459)  Resp: 16 (01/05/23 1459)  BP: (!) 105/59 (01/05/23 1459)  SpO2: 95 % (01/05/23 1459)   Vital Signs (24h Range):  Temp:  [97.6 °F (36.4 °C)-99.1 °F (37.3 °C)] 98.4 °F (36.9 °C)  Pulse:  [59-88] 59  Resp:  [15-20] 16  SpO2:  [94 %-98 %] 95 %  BP: (102-149)/(50-78) 105/59      Weight: 95.3 kg (210 lb)  Body mass index is 33.89 kg/m².    Intake/Output Summary (Last 24 hours) at 1/5/2023 1616  Last data filed at 1/5/2023 0542  Gross per 24 hour   Intake --   Output 1100 ml   Net -1100 ml      Physical Exam  Constitutional:       General: She is not in acute distress.     Appearance: Normal appearance. She is ill-appearing.   HENT:      Head: Normocephalic and atraumatic.      Nose: Nose normal. No rhinorrhea.      Mouth/Throat:      Mouth: Mucous membranes are moist.      Pharynx: No posterior oropharyngeal erythema.   Eyes:      Extraocular Movements: Extraocular movements intact.      Conjunctiva/sclera: Conjunctivae normal.   Cardiovascular:      Rate and Rhythm: Tachycardia present. Rhythm irregular.      Pulses: Normal pulses.   Pulmonary:      Effort: Pulmonary effort is normal.      Breath sounds: Rales present.   Abdominal:      General: Abdomen is flat.      Palpations: Abdomen is soft.      Tenderness: There is no abdominal tenderness.   Musculoskeletal:         General: No swelling or tenderness. Normal range of motion.      Cervical back: Normal range of motion and neck supple.      Right lower leg: Edema present.      Left lower leg: Edema present.   Skin:     General: Skin is warm and dry.   Neurological:      General: No focal deficit present.      Mental Status: She is alert and oriented to person, place, and time.       Significant Labs: All pertinent labs within the past 24 hours have been reviewed.    Significant Imaging: I have reviewed all pertinent imaging results/findings within the past 24 hours.      Assessment/Plan:      ICD (implantable cardioverter-defibrillator) in place  Device interrogation ordered 1/5      CKD (chronic kidney disease), stage IV  Cr at baseline of 1.7    No indications for HD at this time    Plan:  - Trend Cr  - Strict I&Os  - Avoid nephrotoxic agents  - Renally adjust medications        Chronic combined systolic and diastolic heart  failure  NYHA III, ACC stage D  [facility]      Echo 11/15/22  · The left ventricle is severely enlarged with eccentric hypertrophy and severely decreased systolic function. The estimated ejection fraction is 10%.  · The right ventricle is not well visualized but appears normal in size with moderately reduced systolic function.  · Grade II left ventricular diastolic dysfunction.  · Biatrial enlargement.  · The estimated PA systolic pressure is 65 mmHg.  · Elevated central venous pressure (15 mmHg).  · Small posterolateral pericardial effusion.       Home GDMT/Diuretic:  - Beta Blocker: Metoprolol succinate   - ACEi/ARB/ARNi: Max dose Entresto  - MRA: precluded 2/2 CKDIII  - Isosorbide/Hydralazine  - SGLT2: Jardiance   - Diuretic: PO Lasix 80 BID    CXR Improved from previous  BNP 900s  EKG narrow complex with frequent ventricular ectopy    Plan:  - Cardiac Device interrogation ordered  - HTS consulted, appreciate recs  - Continue indicated GDMT as tolerated  - Daily weights (standing if tolerated)  - Strict I/Os  - Fluid restriction at 1500mL  - Cardiac diet    NICM (nonischemic cardiomyopathy)        Paroxysmal atrial fibrillation  Patient with Paroxysmal (<7 days) atrial fibrillation which is controlled currently with Beta Blocker and Amiodarone. Patient is currently in sinus rhythm. Anticoagulation indicated. Anticoagulation done with Eliquis.    Plan:   Continue Metoprolol  Continue Amiodarone      VTE Risk Mitigation (From admission, onward)         Ordered     apixaban tablet 5 mg  2 times daily         01/04/23 1642     Place sequential compression device  Until discontinued         01/04/23 1552     Reason for No Pharmacological VTE Prophylaxis  Once        Question:  Reasons:  Answer:  Already adequately anticoagulated on oral Anticoagulants    01/04/23 1552     IP VTE HIGH RISK PATIENT  Once         01/04/23 1530                Discharge Planning   CHIQUI: 1/6/2023     Code Status: Full Code   Is the patient  medically ready for discharge?: No    Reason for patient still in hospital (select all that apply): Patient trending condition, Laboratory test, Treatment, Imaging and Consult recommendations                     Barak Burnett DO  Department of Hospital Medicine   [facility] - Emergency Dept

## 2023-01-05 NOTE — HPI
56yo F with PMHx of NICM, HFrEF (LVEF 10%), s/p ICD, pulmonary hypertension, DM, HTN, pAF who presents after she was sent in by \[facility]\"" clinic after experiencing a coughing paroxysm followed by nausea and vomiting. She reports that she has been experiencing persistent cough for x1 year with recent gradual worsening with intermittent white/pink sputum production. She also reports gradually worsening of her LAGUERRE that has no progressed to dyspnea at rest. She also reports orthostatic dizziness and near syncope. She reports the sensation of irregular breathing while awake and intermittent PND. She denies any new or acutely worsened symptoms from her chronic HF symtpoms but notes a gradual worsening. She has had 2 admissions for decompensated CHF in the past 2 months, most recent of which was 11/15/22 to 11/22/22. She states that these exacerbations presented as wheezing, CP and respiratory distress, and notes that todays episode was not similar. She states that she was recently placed on Isosorbide/hydralazine with improvement in her CP. She has been evaluated by \[facility]\"" for advanced options including transplant or LVAD and is currently being evaluated by transplant nephro as well as pulmonary for PH. PFTs negative for COPD, ILD. Last [facility] 12/19/22.       ED Course:  In the ED patient was afebrile, /74, HR 72 and satting 94%  on 2L. Initial labs notable for BNP 900s, trop 0.710, WBC 3.22, HGB 9, CO2 22. EKG showed NSR with frequent PVCs, prolonged Qtc but no acute changes from previous. CXR showed improvement of vascular congestion compared to previous. She was admitted to [facility] with consult to \[facility]\"" for further management

## 2023-01-05 NOTE — SUBJECTIVE & OBJECTIVE
Interval History: NAEON. She reports feeling much better after eating last night and breakfast and believes her episode in clinic was a result of fasting for the labs that day. She also reports feeling the sensation of her AICD going off recently but was unsure if this is the case. Will interrogate the device today.     Review of Systems   Constitutional:  Negative for chills, diaphoresis, fatigue and fever.   HENT:  Negative for congestion, rhinorrhea, sore throat and trouble swallowing.    Respiratory:  Positive for cough and shortness of breath. Negative for chest tightness and wheezing.    Cardiovascular:  Positive for leg swelling. Negative for chest pain and palpitations.   Gastrointestinal:  Positive for nausea and vomiting. Negative for abdominal distention, abdominal pain, blood in stool and diarrhea.   Genitourinary:  Negative for difficulty urinating, dysuria, flank pain and hematuria.   Musculoskeletal:  Negative for arthralgias, back pain and neck pain.   Skin:  Negative for rash and wound.   Neurological:  Negative for dizziness, syncope, weakness, light-headedness, numbness and headaches.   Objective:     Vital Signs (Most Recent):  Temp: 98.4 °F (36.9 °C) (01/05/23 1459)  Pulse: (!) 59 (01/05/23 1459)  Resp: 16 (01/05/23 1459)  BP: (!) 105/59 (01/05/23 1459)  SpO2: 95 % (01/05/23 1459)   Vital Signs (24h Range):  Temp:  [97.6 °F (36.4 °C)-99.1 °F (37.3 °C)] 98.4 °F (36.9 °C)  Pulse:  [59-88] 59  Resp:  [15-20] 16  SpO2:  [94 %-98 %] 95 %  BP: (102-149)/(50-78) 105/59     Weight: 95.3 kg (210 lb)  Body mass index is 33.89 kg/m².    Intake/Output Summary (Last 24 hours) at 1/5/2023 1616  Last data filed at 1/5/2023 0542  Gross per 24 hour   Intake --   Output 1100 ml   Net -1100 ml      Physical Exam  Constitutional:       General: She is not in acute distress.     Appearance: Normal appearance. She is ill-appearing.   HENT:      Head: Normocephalic and atraumatic.      Nose: Nose normal. No  rhinorrhea.      Mouth/Throat:      Mouth: Mucous membranes are moist.      Pharynx: No posterior oropharyngeal erythema.   Eyes:      Extraocular Movements: Extraocular movements intact.      Conjunctiva/sclera: Conjunctivae normal.   Cardiovascular:      Rate and Rhythm: Tachycardia present. Rhythm irregular.      Pulses: Normal pulses.   Pulmonary:      Effort: Pulmonary effort is normal.      Breath sounds: Rales present.   Abdominal:      General: Abdomen is flat.      Palpations: Abdomen is soft.      Tenderness: There is no abdominal tenderness.   Musculoskeletal:         General: No swelling or tenderness. Normal range of motion.      Cervical back: Normal range of motion and neck supple.      Right lower leg: Edema present.      Left lower leg: Edema present.   Skin:     General: Skin is warm and dry.   Neurological:      General: No focal deficit present.      Mental Status: She is alert and oriented to person, place, and time.       Significant Labs: All pertinent labs within the past 24 hours have been reviewed.    Significant Imaging: I have reviewed all pertinent imaging results/findings within the past 24 hours.

## 2023-01-05 NOTE — ASSESSMENT & PLAN NOTE
Patient with Paroxysmal (<7 days) atrial fibrillation which is controlled currently with Beta Blocker and Amiodarone. Patient is currently in sinus rhythm. Anticoagulation indicated. Anticoagulation done with Eliquis.    Plan:   Continue Metoprolol  Continue Amiodarone

## 2023-01-05 NOTE — SUBJECTIVE & OBJECTIVE
Past Medical History:   Diagnosis Date    Anemia     Anticoagulant long-term use     Arthritis     Atrial fibrillation     CKD (chronic kidney disease), stage IV 5/8/2018    Congestive heart failure     COPD (chronic obstructive pulmonary disease)     Deep vein thrombosis     elevated bilirubin d/t Gilbert's syndrome     confirmed by [facility] genetic testing, evaluated by hepatology    Encounter for blood transfusion     GERD (gastroesophageal reflux disease)     Hypertension     Hypertensive cardiovascular-renal disease, stage 1-4 or unspecified chronic kidney disease, with heart failure 3/4/2022    Pheochromocytoma, malignant     Restrictive lung disease 4/26/2022    Right kidney mass     Sleep apnea     Thalassemia trait, alpha     Thyroid disease     Type 2 diabetes mellitus with hyperglycemia, without long-term current use of insulin 8/13/2020       Past Surgical History:   Procedure Laterality Date    APPENDECTOMY      BONE MARROW BIOPSY      CARDIAC DEFIBRILLATOR PLACEMENT Left 12/2016    CARDIAC ELECTROPHYSIOLOGY MAPPING AND ABLATION      CARDIAC ELECTROPHYSIOLOGY MAPPING AND ABLATION      COLONOSCOPY N/A 5/6/2022    Procedure: COLONOSCOPY;  Surgeon: Arely Betancourt MD;  Location: [facility] ([address] [place] [address]);  Service: Endoscopy;  Laterality: N/A;  heart transplant candidate/ EF 25% - 2nd floor/ defib - [facility] - [facility]  Eliquis - per Dr. Cortez with [facility], Pt ok to hold Eliquis x 2 days prior-see media tab-outside correspondence dated 12/30/21  - [facility]  verbal instructions/portal instructions/email instructions - s    EYE SURGERY      due to running tears    FRACTURE SURGERY Left     hand 5th digit    HYSTERECTOMY      KNEE SURGERY Left 2016    hematoma    LIVER BIOPSY  10/24/2018    Minimal steatosis, predominantly macrovesicular, 1%, Minimal nonspecific portal inflammation, no fibrosis. No findings on biopsy to explain elevated bilirubin levels. Could be d/t Gilbert's =?- hemolysis    RIGHT HEART CATHETERIZATION  "Right 12/07/2021    Procedure: INSERTION, CATHETER, RIGHT HEART;  Surgeon: Irving Cardenas MD;  Location: [facility] CATH LAB;  Service: Cardiology;  Laterality: Right;    RIGHT HEART CATHETERIZATION Right 12/19/2022    Procedure: INSERTION, CATHETER, RIGHT HEART;  Surgeon: Burke Camilo MD;  Location: [facility] CATH LAB;  Service: Cardiology;  Laterality: Right;    TRANSJUGULAR BIOPSY OF LIVER N/A 10/24/2018    Procedure: BIOPSY, LIVER, TRANSJUGULAR APPROACH;  Surgeon: [facility];  Location: [facility] OR [facility] FLR;  Service: Radiology;  Laterality: N/A;       Review of patient's allergies indicates:   Allergen Reactions    Penicillins Hives and Other (See Comments)    Iodinated contrast media Nausea And Vomiting    Oxycodone-acetaminophen Other (See Comments)     Nausea, Dizziness, Anxiety.  "I don't like how it makes me feel."   Given Hydromorphone 0.5mg IVP  Without problems.  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 Metabolism of other drugs not listed here may also be impacted by UGT1A1 enzyme activity.       Tramadol Nausea And Vomiting and Other (See Comments)     Other reaction(s): Other (See Comments)    Valsartan Other (See Comments)       Current Facility-Administered Medications on File Prior to Encounter   Medication    0.9%  NaCl infusion    vancomycin in dextrose 5 % 1 gram/250 mL IVPB 1,000 mg     Current Outpatient Medications on File Prior to " Encounter   Medication Sig    amiodarone (PACERONE) 200 MG Tab Take 1 tablet (200 mg total) by mouth once daily.    furosemide (LASIX) 80 MG tablet Take 1 tablet (80 mg total) by mouth 2 (two) times a day. Use afternoon dose if needed    hydrALAZINE (APRESOLINE) 100 MG tablet Take 1 tablet (100 mg total) by mouth 3 (three) times daily.    albuterol-ipratropium (DUO-NEB) 2.5 mg-0.5 mg/3 mL nebulizer solution Take 3 mLs by nebulization 2 (two) times a day.    allopurinoL (ZYLOPRIM) 100 MG tablet Take 1 tablet (100 mg total) by mouth once daily.    ALPRAZolam (XANAX) 2 MG Tab Take 2 mg by mouth 2 (two) times daily as needed.    apixaban (ELIQUIS) 5 mg Tab Take 1 tablet (5 mg total) by mouth 2 (two) times daily.    atorvastatin (LIPITOR) 40 MG tablet Take 1 tablet (40 mg total) by mouth every evening.    b complex vitamins tablet Take 1 tablet by mouth once daily.    blood sugar diagnostic (ACCU-CHEK GUIDE TEST STRIPS) Strp 1 strip by Other route 3 (three) times daily.    cetirizine (ZYRTEC) 10 MG tablet Take 10 mg by mouth.    diclofenac sodium (VOLTAREN) 1 % Gel APPLY 2 G TOPICALLY 3 (THREE) TIMES DAILY AS NEEDED (PAIN).    DULoxetine (CYMBALTA) 30 MG capsule Take 1 capsule orally once a day.    empagliflozin (JARDIANCE) 25 mg tablet Jardiance 25 mg tablet    ergocalciferol (ERGOCALCIFEROL) 50,000 unit Cap Take 1 capsule (50,000 Units total) by mouth every 7 days.    ferrous sulfate 325 (65 FE) MG EC tablet Take 1 tablet (325 mg total) by mouth once daily.    fluticasone propionate (FLONASE) 50 mcg/actuation nasal spray 2 sprays by Each Nostril route daily as needed for Rhinitis.     glimepiride (AMARYL) 2 MG tablet TAKE 1 TABLET BY MOUTH EVERY DAY WITH BREAKFAST    isosorbide dinitrate (ISORDIL) 30 MG Tab Take 1 tablet (30 mg total) by mouth 3 (three) times daily.    lancets (ACCU-CHEK SOFTCLIX LANCETS) Misc 1 Device by Misc.(Non-Drug; Combo Route) route 3 (three) times daily.    LIDOcaine (LIDODERM) 5 % Place 1  patch onto the skin once daily. Remove & Discard patch within 12 hours or as directed by MD    metoprolol succinate (TOPROL-XL) 100 MG 24 hr tablet Take 100 mg by mouth once daily.    mometasone-formoterol (DULERA) 200-5 mcg/actuation inhaler Inhale 2 puffs into the lungs 2 (two) times daily. Controller    montelukast (SINGULAIR) 10 mg tablet Take 1 tablet every day by oral route for 30 days.    NITROSTAT 0.4 mg SL tablet Take 2.5 tablets (1 mg total) by mouth every 5 (five) minutes as needed for Chest pain. No more than 3 tablets in 15 minutes.    ondansetron (ZOFRAN-ODT) 4 MG TbDL Take 1 tablet (4 mg total) by mouth 2 (two) times daily.    pantoprazole (PROTONIX) 40 MG tablet TAKE 1 TABLET BY MOUTH DAILY IN THE MORNING    polyethylene glycol (GLYCOLAX) 17 gram PwPk Take 17 g by mouth 3 (three) times daily as needed (constipation/hard stools).    potassium chloride (MICRO-K) 10 MEQ CpSR Take 1 capsule (10 mEq total) by mouth once daily.    promethazine-codeine 6.25-10 mg/5 ml (PHENERGAN WITH CODEINE) 6.25-10 mg/5 mL syrup Take 5 ml by mouth every 4-6 hours as needed.    rOPINIRole (REQUIP) 4 MG tablet Take 1 tablet (4 mg total) by mouth once daily. Pt taking 4mg daily    sacubitriL-valsartan (ENTRESTO)  mg per tablet Take 1 tablet by mouth 2 (two) times daily.    SPIRIVA WITH HANDIHALER 18 mcg inhalation capsule INHALE 1 CAPSULE INTO THE LUNGS ONCE DAILY.    VENTOLIN HFA 90 mcg/actuation inhaler Inhale 2 puffs into the lungs 2 (two) times daily as needed.     [DISCONTINUED] metoclopramide HCl (REGLAN) 5 MG tablet Take 1 tablet by mouth 2 (two) times daily.     Family History       Problem Relation (Age of Onset)    Cancer Mother    Cirrhosis Brother    Diabetes Brother    Heart attack Father    Heart disease Father, Sister, Brother, Sister, Brother    Hypertension Father, Brother          Tobacco Use    Smoking status: Never    Smokeless tobacco: Never   Substance and Sexual Activity    Alcohol use: Yes      Comment: up to 1 yr ago drank 2-3 drinks on occasion but sporadic    Drug use: No    Sexual activity: Yes     Partners: Male     Review of Systems   Constitutional:  Negative for chills, diaphoresis, fatigue and fever.   HENT:  Negative for congestion, rhinorrhea, sore throat and trouble swallowing.    Respiratory:  Positive for cough and shortness of breath. Negative for chest tightness and wheezing.    Cardiovascular:  Positive for leg swelling. Negative for chest pain and palpitations.   Gastrointestinal:  Positive for nausea and vomiting. Negative for abdominal distention, abdominal pain, blood in stool and diarrhea.   Genitourinary:  Negative for difficulty urinating, dysuria, flank pain and hematuria.   Musculoskeletal:  Negative for arthralgias, back pain and neck pain.   Skin:  Negative for rash and wound.   Neurological:  Negative for dizziness, syncope, weakness, light-headedness, numbness and headaches.   Objective:     Vital Signs (Most Recent):  Temp: 99.1 °F (37.3 °C) (01/04/23 1855)  Pulse: 88 (01/04/23 1942)  Resp: 16 (01/04/23 1942)  BP: 132/83 (01/04/23 1530)  SpO2: 96 % (01/04/23 1942) Vital Signs (24h Range):  Temp:  [98.3 °F (36.8 °C)-99.1 °F (37.3 °C)] 99.1 °F (37.3 °C)  Pulse:  [71-97] 88  Resp:  [14-20] 16  SpO2:  [90 %-99 %] 96 %  BP: (120-158)/(73-94) 132/83     Weight: 95.3 kg (210 lb)  Body mass index is 33.89 kg/m².    Physical Exam  Constitutional:       General: She is not in acute distress.     Appearance: Normal appearance. She is ill-appearing.   HENT:      Head: Normocephalic and atraumatic.      Nose: Nose normal. No rhinorrhea.      Mouth/Throat:      Mouth: Mucous membranes are moist.      Pharynx: No posterior oropharyngeal erythema.   Eyes:      Extraocular Movements: Extraocular movements intact.      Conjunctiva/sclera: Conjunctivae normal.   Cardiovascular:      Rate and Rhythm: Tachycardia present. Rhythm irregular.      Pulses: Normal pulses.   Pulmonary:      Effort:  Pulmonary effort is normal.      Breath sounds: Rales present.   Abdominal:      General: Abdomen is flat.      Palpations: Abdomen is soft.      Tenderness: There is no abdominal tenderness.   Musculoskeletal:         General: No swelling or tenderness. Normal range of motion.      Cervical back: Normal range of motion and neck supple.      Right lower leg: Edema present.      Left lower leg: Edema present.   Skin:     General: Skin is warm and dry.   Neurological:      General: No focal deficit present.      Mental Status: She is alert and oriented to person, place, and time.           Significant Labs: All pertinent labs within the past 24 hours have been reviewed.    Significant Imaging: I have reviewed all pertinent imaging results/findings within the past 24 hours.

## 2023-01-05 NOTE — PT/OT/SLP RE-EVAL
Physical Therapy Co-Evaluation and Co-Treatment  Co-evaluation with OT for maximal pt participation, safety, and activity tolerance    Patient Name:  Hafsa Hawley   MRN:  [id number]  Admit Date: 1/4/2023  Admitting Diagnosis:  Acute on chronic respiratory failure with hypoxia   Length of Stay: 1 days  Recent Surgery: * No surgery found *      Recommendations:     Discharge Recommendations:  home with home health   Discharge Equipment Recommendations: none   Barriers to discharge: Inaccessible home environment and Evolving Clinical Presentation    Assessment:     Hafsa Hawley is a 57 y.o. female admitted with a medical diagnosis of Acute on chronic respiratory failure with hypoxia.  She presents with the following impairments/functional limitations: weakness, impaired endurance, impaired self care skills, impaired functional mobility, gait instability, impaired balance, decreased safety awareness, decreased lower extremity function.     Pt agreeable to therapy, visibly emotional upon therapist entry. Pt is concerned about her health, has a family history of heart issues and related deaths and is worried she is headed down a similar path. Pt SBA for bed mobility, CGA for STS maintaining sternal precautions, ambulates to bathroom with SBA, SUP at sink and for toileting, SBA back into supine in bed. Continue to train ambulation and incorporate stairs as able to facilitate return home.     Rehab Prognosis: Good; patient would benefit from acute skilled PT services to address these deficits and reach maximum level of function.      Treatment Tolerated: Well    Highest level of mobility achieved this visit: ambulates ~ 25ft w/ SBA and no A/D    Activity with RN/PCT: ambulate to bathroom with 1 person supervision    Plan:     During this hospitalization, patient to be seen 3 x/week to address the identified rehab impairments via therapeutic activities, therapeutic exercises, neuromuscular re-education, gait training  "and progress towards the established goals.    Plan of Care Expires:  23    Subjective     RN notified prior to session. No family present upon PT entrance into room.    Chief Complaint: concern over health  Patient/Family Comments/goals: "My sister and father both  of heart disease"  Pain/Comfort:  Pain Rating 1: 0/10    Social History:  Residence: lives with their family trailer/mobile home with 5 NIRAJ and R HR.  Support available: family  Equipment Used: walker, rolling  Equipment Owned (not using): None  Prior level of function: independent  Work: Disability.   Drive: yes.       Objective:     Additional staff present: OT     Patient found HOB elevated with: oxygen     General Precautions: Standard, Cardiac fall   Orthopedic Precautions:N/A   Braces: N/A   Body mass index is 33.89 kg/m².  Oxygen Device: Room Air      Vitals: BP (!) 149/76   Pulse 77   Temp 98.4 °F (36.9 °C) (Oral)   Resp 18   Ht 5' 6" (1.676 m)   Wt 95.3 kg (210 lb)   LMP  (LMP Unknown)   SpO2 96%   BMI 33.89 kg/m²     Exams:  Cognition:   Alert and Cooperative  Command following: Follows two-step verbal commands  Fluency: clear/fluent  Hearing: Intact  Vision:  Intact  Skin Integrity: Visible skin intact    Physical Exam:   Edema - None noted  ROM - ASUNCION LEs WFL  Strength - ASUNCION hips 4-/5, ASUNCION knees and ankles 4+/5   Sensation - Intact to light touch  Coordination - No deficits noted    Outcome Measures:    AM-PAC 6 CLICK MOBILITY  Turning over in bed (including adjusting bedclothes, sheets and blankets)?: 4  Sitting down on and standing up from a chair with arms (e.g., wheelchair, bedside commode, etc.): 3  Moving from lying on back to sitting on the side of the bed?: 4  Moving to and from a bed to a chair (including a wheelchair)?: 3  Need to walk in hospital room?: 3  Climbing 3-5 steps with a railing?: 3  Basic Mobility Total Score: 20     Functional Mobility:    Bed Mobility:   Scooting to HOB via supine bridge: " supervision  Supine to Sit: supervision; from R side of bed  Scooting anteriorly to EOB to have both feet planted on floor: supervision  Sit to Supine: supervision; to L side of bed    Sitting Balance at Edge of Bed:  Assistance Level Required: Supervision  Time: 10 min  Postural deviations noted: no deviations noted    Transfers:   Sit <> Stand Transfer: contact guard assistance with no assistive device  Simulated sternal precautions  Stand <> Sit Transfer: stand by assistance with no assistive device  [id number] from EOB and [id number] from toliet    Standing Balance:  Assistance Level Required: Supervision  Patient used: no assistive device  Time: 10 min  Postural deviations noted: no deviations noted      Gait:   Patient ambulated: ~25ft   Patient required: supervision to standy by assistance  Patient used:  no assistive device  Gait Pattern observed: swing-through gait  Gait Deviation(s): steady gait  Impairments due to: impaired balance    Education:  Time provided for education, counseling and discussion of health disposition in regards to patient's current status  All questions answered within PT scope of practice and to patient's satisfaction  PT role in POC to address current functional deficits  Pt educated on proper body mechanics, safety techniques, and energy conservation with PT facilitation and cueing throughout session  Maintaining activity level to improve overall health    Patient left HOB elevated with call button in reach.    GOALS:   Multidisciplinary Problems       Physical Therapy Goals          Problem: Physical Therapy    Goal Priority Disciplines Outcome Goal Variances Interventions   Physical Therapy Goal     PT, PT/OT Ongoing, Progressing     Description: Goals to met by 1/19/2023    1. Bed to chair transfer with Supervision using No Assistive Device  2. Gait  x 150 feet with Supervision using No Assistive Device   3. Ascend/descend 5 stair(s) with right Handrails Stand-by Assistance using  No Assistive Device.   4. Stand for 10 minutes with Supervision using No Assistive Device  5. Lower extremity exercise program x15 reps per Instruction, with assistance as needed in order to facilitate improved strength, improved postural control, and improvement in functional independence                         History:     Past Medical History:   Diagnosis Date    Anemia     Anticoagulant long-term use     Arthritis     Atrial fibrillation     CKD (chronic kidney disease), stage IV 5/8/2018    Congestive heart failure     COPD (chronic obstructive pulmonary disease)     Deep vein thrombosis     elevated bilirubin d/t Gilbert's syndrome     confirmed by [place] genetic testing, evaluated by hepatology    Encounter for blood transfusion     GERD (gastroesophageal reflux disease)     Hypertension     Hypertensive cardiovascular-renal disease, stage 1-4 or unspecified chronic kidney disease, with heart failure 3/4/2022    Pheochromocytoma, malignant     Restrictive lung disease 4/26/2022    Right kidney mass     Sleep apnea     Thalassemia trait, alpha     Thyroid disease     Type 2 diabetes mellitus with hyperglycemia, without long-term current use of insulin 8/13/2020       Past Surgical History:   Procedure Laterality Date    APPENDECTOMY      BONE MARROW BIOPSY      CARDIAC DEFIBRILLATOR PLACEMENT Left 12/2016    CARDIAC ELECTROPHYSIOLOGY MAPPING AND ABLATION      CARDIAC ELECTROPHYSIOLOGY MAPPING AND ABLATION      COLONOSCOPY N/A 5/6/2022    Procedure: COLONOSCOPY;  Surgeon: Arely Betancourt MD;  Location: [facility] ([address] [place] [address]);  Service: Endoscopy;  Laterality: N/A;  heart transplant candidate/ EF 25% - 2nd floor/ defib - [facility] - [facility]  Ellen - per Dr. Cortez with [facility], Pt ok to hold Eliquis x 2 days prior-see media tab-outside correspondence dated 12/30/21  - [facility]  verbal instructions/portal instructions/email instructions - s    EYE SURGERY      due to running tears    FRACTURE SURGERY Left     hand 5th  digit    HYSTERECTOMY      KNEE SURGERY Left 2016    hematoma    LIVER BIOPSY  10/24/2018    Minimal steatosis, predominantly macrovesicular, 1%, Minimal nonspecific portal inflammation, no fibrosis. No findings on biopsy to explain elevated bilirubin levels. Could be d/t Gilbert's =?- hemolysis    RIGHT HEART CATHETERIZATION Right 12/07/2021    Procedure: INSERTION, CATHETER, RIGHT HEART;  Surgeon: Irving Cardenas MD;  Location: [facility] CATH LAB;  Service: Cardiology;  Laterality: Right;    RIGHT HEART CATHETERIZATION Right 12/19/2022    Procedure: INSERTION, CATHETER, RIGHT HEART;  Surgeon: Burke Camilo MD;  Location: [facility] CATH LAB;  Service: Cardiology;  Laterality: Right;    TRANSJUGULAR BIOPSY OF LIVER N/A 10/24/2018    Procedure: BIOPSY, LIVER, TRANSJUGULAR APPROACH;  Surgeon: Carmen Diagnostic Provider;  Location: [facility] OR [address] [place] [address];  Service: Radiology;  Laterality: N/A;       Time Tracking:     PT Received On: 01/05/23  PT Start Time: 0837     PT Stop Time: 0905  PT Total Time (min): 28 min     Billable Minutes: Evaluation 1 procedure and Gait Training 12 min    Catalino Hunt, PT, DPT  1/5/2023

## 2023-01-06 PROBLEM — I27.20 PULMONARY HTN: Chronic | Status: ACTIVE | Noted: 2023-01-06

## 2023-01-06 PROBLEM — I27.20 PULMONARY HTN: Status: ACTIVE | Noted: 2023-01-06

## 2023-01-06 LAB
ALBUMIN SERPL BCP-MCNC: 3.6 G/DL (ref 3.5–5.2)
ALP SERPL-CCNC: 42 U/L (ref 55–135)
ALT SERPL W/O P-5'-P-CCNC: 9 U/L (ref 10–44)
ANION GAP SERPL CALC-SCNC: 11 MMOL/L (ref 8–16)
ANISOCYTOSIS BLD QL SMEAR: ABNORMAL
AST SERPL-CCNC: 13 U/L (ref 10–40)
BACTERIA UR CULT: NORMAL
BACTERIA UR CULT: NORMAL
BASOPHILS # BLD AUTO: 0.02 K/UL (ref 0–0.2)
BASOPHILS NFR BLD: 0.5 % (ref 0–1.9)
BILIRUB SERPL-MCNC: 1.2 MG/DL (ref 0.1–1)
BUN SERPL-MCNC: 30 MG/DL (ref 6–20)
CALCIUM SERPL-MCNC: 9 MG/DL (ref 8.7–10.5)
CHLORIDE SERPL-SCNC: 105 MMOL/L (ref 95–110)
CO2 SERPL-SCNC: 24 MMOL/L (ref 23–29)
CREAT SERPL-MCNC: 2.1 MG/DL (ref 0.5–1.4)
DIFFERENTIAL METHOD: ABNORMAL
EOSINOPHIL # BLD AUTO: 0.1 K/UL (ref 0–0.5)
EOSINOPHIL NFR BLD: 1.8 % (ref 0–8)
ERYTHROCYTE [DISTWIDTH] IN BLOOD BY AUTOMATED COUNT: 29.1 % (ref 11.5–14.5)
EST. GFR  (NO RACE VARIABLE): 27 ML/MIN/1.73 M^2
GLUCOSE SERPL-MCNC: 80 MG/DL (ref 70–110)
HCT VFR BLD AUTO: 29.9 % (ref 37–48.5)
HGB BLD-MCNC: 8.2 G/DL (ref 12–16)
HYPOCHROMIA BLD QL SMEAR: ABNORMAL
IMM GRANULOCYTES # BLD AUTO: 0.01 K/UL (ref 0–0.04)
IMM GRANULOCYTES NFR BLD AUTO: 0.3 % (ref 0–0.5)
LYMPHOCYTES # BLD AUTO: 0.8 K/UL (ref 1–4.8)
LYMPHOCYTES NFR BLD: 20.3 % (ref 18–48)
MAGNESIUM SERPL-MCNC: 2 MG/DL (ref 1.6–2.6)
MCH RBC QN AUTO: 16.7 PG (ref 27–31)
MCHC RBC AUTO-ENTMCNC: 27.4 G/DL (ref 32–36)
MCV RBC AUTO: 61 FL (ref 82–98)
MONOCYTES # BLD AUTO: 0.5 K/UL (ref 0.3–1)
MONOCYTES NFR BLD: 14.1 % (ref 4–15)
NEUTROPHILS # BLD AUTO: 2.4 K/UL (ref 1.8–7.7)
NEUTROPHILS NFR BLD: 63 % (ref 38–73)
NRBC BLD-RTO: 1 /100 WBC
OVALOCYTES BLD QL SMEAR: ABNORMAL
PHOSPHATE SERPL-MCNC: 4.5 MG/DL (ref 2.7–4.5)
PLATELET # BLD AUTO: 185 K/UL (ref 150–450)
PMV BLD AUTO: ABNORMAL FL (ref 9.2–12.9)
POCT GLUCOSE: 109 MG/DL (ref 70–110)
POCT GLUCOSE: 118 MG/DL (ref 70–110)
POCT GLUCOSE: 125 MG/DL (ref 70–110)
POCT GLUCOSE: 84 MG/DL (ref 70–110)
POIKILOCYTOSIS BLD QL SMEAR: SLIGHT
POLYCHROMASIA BLD QL SMEAR: ABNORMAL
POTASSIUM SERPL-SCNC: 4.1 MMOL/L (ref 3.5–5.1)
PROT SERPL-MCNC: 6.1 G/DL (ref 6–8.4)
RBC # BLD AUTO: 4.92 M/UL (ref 4–5.4)
SCHISTOCYTES BLD QL SMEAR: ABNORMAL
SODIUM SERPL-SCNC: 140 MMOL/L (ref 136–145)
SPHEROCYTES BLD QL SMEAR: ABNORMAL
TARGETS BLD QL SMEAR: ABNORMAL
WBC # BLD AUTO: 3.84 K/UL (ref 3.9–12.7)

## 2023-01-06 PROCEDURE — 63600175 PHARM REV CODE 636 W HCPCS: Mod: NTX

## 2023-01-06 PROCEDURE — 99232 PR SUBSEQUENT HOSPITAL CARE,LEVL II: ICD-10-PCS | Mod: NTX,,, | Performed by: STUDENT IN AN ORGANIZED HEALTH CARE EDUCATION/TRAINING PROGRAM

## 2023-01-06 PROCEDURE — 83880 ASSAY OF NATRIURETIC PEPTIDE: CPT | Mod: NTX | Performed by: PHYSICIAN ASSISTANT

## 2023-01-06 PROCEDURE — 94761 N-INVAS EAR/PLS OXIMETRY MLT: CPT | Mod: NTX

## 2023-01-06 PROCEDURE — 27000221 HC OXYGEN, UP TO 24 HOURS: Mod: NTX

## 2023-01-06 PROCEDURE — 25000003 PHARM REV CODE 250: Mod: NTX

## 2023-01-06 PROCEDURE — 25000242 PHARM REV CODE 250 ALT 637 W/ HCPCS: Mod: NTX

## 2023-01-06 PROCEDURE — 80053 COMPREHEN METABOLIC PANEL: CPT | Mod: NTX

## 2023-01-06 PROCEDURE — 36415 COLL VENOUS BLD VENIPUNCTURE: CPT | Mod: NTX | Performed by: PHYSICIAN ASSISTANT

## 2023-01-06 PROCEDURE — 36415 COLL VENOUS BLD VENIPUNCTURE: CPT | Mod: NTX

## 2023-01-06 PROCEDURE — 99233 SBSQ HOSP IP/OBS HIGH 50: CPT | Mod: NTX,,, | Performed by: PHYSICIAN ASSISTANT

## 2023-01-06 PROCEDURE — 12000002 HC ACUTE/MED SURGE SEMI-PRIVATE ROOM: Mod: NTX

## 2023-01-06 PROCEDURE — 94640 AIRWAY INHALATION TREATMENT: CPT | Mod: NTX

## 2023-01-06 PROCEDURE — 84100 ASSAY OF PHOSPHORUS: CPT | Mod: NTX

## 2023-01-06 PROCEDURE — 99233 PR SUBSEQUENT HOSPITAL CARE,LEVL III: ICD-10-PCS | Mod: NTX,,, | Performed by: PHYSICIAN ASSISTANT

## 2023-01-06 PROCEDURE — 83735 ASSAY OF MAGNESIUM: CPT | Mod: NTX

## 2023-01-06 PROCEDURE — 99232 SBSQ HOSP IP/OBS MODERATE 35: CPT | Mod: NTX,,, | Performed by: STUDENT IN AN ORGANIZED HEALTH CARE EDUCATION/TRAINING PROGRAM

## 2023-01-06 PROCEDURE — 99900035 HC TECH TIME PER 15 MIN (STAT): Mod: NTX

## 2023-01-06 PROCEDURE — 85025 COMPLETE CBC W/AUTO DIFF WBC: CPT | Mod: NTX

## 2023-01-06 RX ORDER — IPRATROPIUM BROMIDE AND ALBUTEROL SULFATE 2.5; .5 MG/3ML; MG/3ML
3 SOLUTION RESPIRATORY (INHALATION) 2 TIMES DAILY PRN
Status: DISCONTINUED | OUTPATIENT
Start: 2023-01-06 | End: 2023-01-08 | Stop reason: HOSPADM

## 2023-01-06 RX ADMIN — SACUBITRIL AND VALSARTAN 1 TABLET: 97; 103 TABLET, FILM COATED ORAL at 09:01

## 2023-01-06 RX ADMIN — HYDRALAZINE HYDROCHLORIDE 100 MG: 50 TABLET ORAL at 08:01

## 2023-01-06 RX ADMIN — POTASSIUM CHLORIDE 10 MEQ: 10 CAPSULE, COATED, EXTENDED RELEASE ORAL at 08:01

## 2023-01-06 RX ADMIN — PANTOPRAZOLE SODIUM 40 MG: 40 TABLET, DELAYED RELEASE ORAL at 08:01

## 2023-01-06 RX ADMIN — ONDANSETRON 4 MG: 4 TABLET, ORALLY DISINTEGRATING ORAL at 08:01

## 2023-01-06 RX ADMIN — APIXABAN 5 MG: 5 TABLET, FILM COATED ORAL at 09:01

## 2023-01-06 RX ADMIN — ISOSORBIDE DINITRATE 30 MG: 10 TABLET ORAL at 08:01

## 2023-01-06 RX ADMIN — SACUBITRIL AND VALSARTAN 1 TABLET: 97; 103 TABLET, FILM COATED ORAL at 08:01

## 2023-01-06 RX ADMIN — ATORVASTATIN CALCIUM 40 MG: 20 TABLET, FILM COATED ORAL at 09:01

## 2023-01-06 RX ADMIN — FUROSEMIDE 80 MG: 10 INJECTION, SOLUTION INTRAMUSCULAR; INTRAVENOUS at 05:01

## 2023-01-06 RX ADMIN — DULOXETIN HYDROCHLORIDE 30 MG: 30 CAPSULE, DELAYED RELEASE ORAL at 08:01

## 2023-01-06 RX ADMIN — TIOTROPIUM BROMIDE INHALATION SPRAY 2 PUFF: 3.12 SPRAY, METERED RESPIRATORY (INHALATION) at 04:01

## 2023-01-06 RX ADMIN — APIXABAN 5 MG: 5 TABLET, FILM COATED ORAL at 08:01

## 2023-01-06 RX ADMIN — MONTELUKAST 10 MG: 10 TABLET, FILM COATED ORAL at 08:01

## 2023-01-06 RX ADMIN — AMIODARONE HYDROCHLORIDE 200 MG: 200 TABLET ORAL at 08:01

## 2023-01-06 RX ADMIN — FUROSEMIDE 80 MG: 10 INJECTION, SOLUTION INTRAMUSCULAR; INTRAVENOUS at 06:01

## 2023-01-06 RX ADMIN — HYDRALAZINE HYDROCHLORIDE 100 MG: 50 TABLET ORAL at 09:01

## 2023-01-06 RX ADMIN — FLUTICASONE FUROATE AND VILANTEROL TRIFENATATE 1 PUFF: 200; 25 POWDER RESPIRATORY (INHALATION) at 08:01

## 2023-01-06 RX ADMIN — ISOSORBIDE DINITRATE 30 MG: 10 TABLET ORAL at 09:01

## 2023-01-06 RX ADMIN — ONDANSETRON 4 MG: 4 TABLET, ORALLY DISINTEGRATING ORAL at 09:01

## 2023-01-06 NOTE — PROGRESS NOTES
[facility] ([address] Medicine  Progress Note    Patient Name: Hafsa Hawley  MRN: [id number]  Patient Class: IP- Inpatient   Admission Date: 1/4/2023  Length of Stay: 2 days  Attending Physician: Barak Barajas MD  Primary Care Provider: Krysta Tony MD    Subjective:     Principal Problem:Acute on chronic respiratory failure with hypoxia    HPI:  56yo F with PMHx of NICM, HFrEF (LVEF 10%), s/p ICD, pulmonary hypertension, DM, HTN, pAF who presents after she was sent in by [facility] clinic after experiencing a coughing paroxysm followed by nausea and vomiting. She reports that she has been experiencing persistent cough for x1 year with recent gradual worsening with intermittent white/pink sputum production. She also reports gradually worsening of her LAGUERRE that has no progressed to dyspnea at rest. She also reports orthostatic dizziness and near syncope. She reports the sensation of irregular breathing while awake and intermittent PND. She denies any new or acutely worsened symptoms from her chronic HF symtpoms but notes a gradual worsening. She has had 2 admissions for decompensated CHF in the past 2 months, most recent of which was 11/15/22 to 11/22/22. She states that these exacerbations presented as wheezing, CP and respiratory distress, and notes that todays episode was not similar. She states that she was recently placed on Isosorbide/hydralazine with improvement in her CP. She has been evaluated by [facility] for advanced options including transplant or LVAD and is currently being evaluated by transplant nephro as well as pulmonary for PH. PFTs negative for COPD, ILD. Last RHC 12/19/22.       ED Course:  In the ED patient was afebrile, /74, HR 72 and satting 94%  on 2L. Initial labs notable for BNP 900s, trop 0.710, WBC 3.22, HGB 9, CO2 22. EKG showed NSR with frequent PVCs, prolonged Qtc but no acute changes from previous. CXR showed improvement of vascular congestion  compared to previous. She was admitted to IM5 with consult to [facility] for further management      Overview/Hospital Course:  Admitted to hospital medicine from cardiology clinic for dyspnea and fatigue. Presentation in keeping with CHF, possible progression of disease. She was able to tolerate PO intake without vomiting as well as with improvement of her fatigue after eating. AICD interrogated 1/5. [facility] consulted to evaluate patients advanced HF options, awaiting recs. Renal funtion stable with diuresis with patient able to wean supplemental O2 to 2L which she uses PRN at home. Failed 6 minute walk test, will need oxygen all of the time instead of prn. Continue diuresis.     Interval History: No issues overnight. Feeling a bit better this morning. Worked with PT/OT. Dyspnea improved, diuresing well, will continue current diuresis. Awaiting [facility] recs     Review of Systems   Constitutional:  Negative for chills, diaphoresis, fatigue and fever.   HENT:  Negative for congestion, rhinorrhea, sore throat and trouble swallowing.    Eyes:  Negative for photophobia and visual disturbance.   Respiratory:  Positive for cough and shortness of breath. Negative for chest tightness and wheezing.    Cardiovascular:  Positive for leg swelling. Negative for chest pain and palpitations.   Gastrointestinal:  Negative for abdominal distention, abdominal pain, blood in stool, diarrhea, nausea and vomiting.   Genitourinary:  Negative for difficulty urinating, dysuria, flank pain and hematuria.   Musculoskeletal:  Negative for arthralgias, back pain and neck pain.   Skin:  Negative for rash and wound.   Neurological:  Negative for dizziness, syncope, weakness, light-headedness, numbness and headaches.   Psychiatric/Behavioral:  Negative for agitation and confusion.    Objective:     Vital Signs (Most Recent):  Temp: 99.5 °F (37.5 °C) (01/06/23 1115)  Pulse: 75 (01/06/23 1204)  Resp: 18 (01/06/23 1115)  BP: (!) 106/55 (01/06/23 1115)  SpO2: (!)  94 % (01/06/23 1204)   Vital Signs (24h Range):  Temp:  [97.9 °F (36.6 °C)-99.5 °F (37.5 °C)] 99.5 °F (37.5 °C)  Pulse:  [58-76] 75  Resp:  [16-18] 18  SpO2:  [87 %-96 %] 94 %  BP: (103-120)/(55-70) 106/55     Weight: 95.3 kg (210 lb)  Body mass index is 33.89 kg/m².    Intake/Output Summary (Last 24 hours) at 1/6/2023 1355  Last data filed at 1/6/2023 0654  Gross per 24 hour   Intake --   Output 650 ml   Net -650 ml        Physical Exam  Vitals and nursing note reviewed.   Constitutional:       General: She is not in acute distress.     Appearance: Normal appearance. She is ill-appearing.   HENT:      Head: Normocephalic and atraumatic.      Nose: Nose normal. No rhinorrhea.      Mouth/Throat:      Mouth: Mucous membranes are moist.      Pharynx: No posterior oropharyngeal erythema.   Eyes:      Extraocular Movements: Extraocular movements intact.      Conjunctiva/sclera: Conjunctivae normal.      Pupils: Pupils are equal, round, and reactive to light.   Cardiovascular:      Rate and Rhythm: Tachycardia present. Rhythm irregular.      Pulses: Normal pulses.   Pulmonary:      Effort: Pulmonary effort is normal.      Breath sounds: Rales present. No wheezing or rhonchi.   Abdominal:      General: Abdomen is flat.      Palpations: Abdomen is soft.      Tenderness: There is no abdominal tenderness. There is no guarding.   Musculoskeletal:         General: No swelling or tenderness. Normal range of motion.      Cervical back: Normal range of motion and neck supple.      Right lower leg: Edema present.      Left lower leg: Edema present.   Skin:     General: Skin is warm and dry.      Capillary Refill: Capillary refill takes less than 2 seconds.      Findings: No erythema or rash.   Neurological:      General: No focal deficit present.      Mental Status: She is alert and oriented to person, place, and time.   Psychiatric:         Mood and Affect: Mood normal.         Thought Content: Thought content normal.      Significant Labs: All pertinent labs within the past 24 hours have been reviewed.    Significant Imaging: I have reviewed all pertinent imaging results/findings within the past 24 hours.      Assessment/Plan:      * Acute on chronic respiratory failure with hypoxia  Patient with Hypoxic Respiratory failure which is Acute on chronic.  she is on home oxygen at 2 LPM. Supplemental oxygen was provided and noted- Oxygen Concentration (%):  [28-32] 28.   Signs/symptoms of respiratory failure include- tachypnea and increased work of breathing. Contributing diagnoses includes - CHF Labs and images were reviewed.    See Acute on chronic CHF    Type 2 diabetes mellitus without complication, without long-term current use of insulin  Patient's FSGs are controlled on current medication regimen.  Last A1c reviewed-   Lab Results   Component Value Date    HGBA1C 4.6 01/04/2023     Most recent fingerstick glucose reviewed-   Recent Labs   Lab 01/05/23  1742 01/05/23 2010 01/06/23  0757 01/06/23  1119   POCTGLUCOSE 179* 148* 84 118*     Hold Oral hypoglycemics while patient is in the hospital.  Blood glucoses have been within goal, will monitor and start long acting if indicated    ICD (implantable cardioverter-defibrillator) in place  Device interrogation ordered 1/5      CKD (chronic kidney disease), stage IV  Cr at baseline of 1.7  No indications for HD at this time    Plan:  - Trend Cr  - Strict I&Os  - Avoid nephrotoxic agents  - Renally adjust medications  - monitor urine output        Acute on chronic combined systolic and diastolic heart failure  NYHA III, ACC stage D  NICM      Echo 11/15/22  · The left ventricle is severely enlarged with eccentric hypertrophy and severely decreased systolic function. The estimated ejection fraction is 10%.  · The right ventricle is not well visualized but appears normal in size with moderately reduced systolic function.  · Grade II left ventricular diastolic dysfunction.  · Biatrial  enlargement.  · The estimated PA systolic pressure is 65 mmHg.  · Elevated central venous pressure (15 mmHg).  · Small posterolateral pericardial effusion.       Home GDMT/Diuretic:  - Beta Blocker: Metoprolol succinate   - ACEi/ARB/ARNi: Max dose Entresto  - MRA: precluded 2/2 CKDIII  - Isosorbide/Hydralazine  - SGLT2: Jardiance   - Diuretic: PO Lasix 80 BID    CXR Improved from previous  BNP 900s  EKG narrow complex with frequent ventricular ectopy    Plan:  - Cardiac Device interrogation ordered  - HTS consulted, appreciate recs  - Continue indicated GDMT as tolerated  - continue lasix 80mg IV BID  - Daily weights (standing if tolerated)  - Strict I/Os  - Fluid restriction at 1500mL  - Cardiac diet    NICM (nonischemic cardiomyopathy)        Paroxysmal atrial fibrillation  Patient with Paroxysmal (<7 days) atrial fibrillation which is controlled currently with Beta Blocker and Amiodarone. Patient is currently in sinus rhythm. Anticoagulation indicated. Anticoagulation done with Eliquis.    Plan:   Continue Metoprolol  Continue Amiodarone  Continue apixaban  Cardiac monitoring    MELISSA (obstructive sleep apnea)  CPAP QHS      Essential hypertension  Continue hydralazine, ISMN, entresto  Monitor BP  Up-titrate as indicated        VTE Risk Mitigation (From admission, onward)         Ordered     apixaban tablet 5 mg  2 times daily         01/04/23 1642     Place sequential compression device  Until discontinued         01/04/23 1552     Reason for No Pharmacological VTE Prophylaxis  Once        Question:  Reasons:  Answer:  Already adequately anticoagulated on oral Anticoagulants    01/04/23 1552     IP VTE HIGH RISK PATIENT  Once         01/04/23 1530                Discharge Planning   CHIQUI: 1/6/2023     Code Status: Full Code   Is the patient medically ready for discharge?: No    Reason for patient still in hospital (select all that apply): Patient trending condition           Miriam Rojas MD  Department of  [facility] Medicine   [facility] - Intensive Care ([place])

## 2023-01-06 NOTE — ASSESSMENT & PLAN NOTE
Patient's FSGs are controlled on current medication regimen.  Last A1c reviewed-   Lab Results   Component Value Date    HGBA1C 4.6 01/04/2023     Most recent fingerstick glucose reviewed-   Recent Labs   Lab 01/05/23  1742 01/05/23 2010 01/06/23  0757 01/06/23  1119   POCTGLUCOSE 179* 148* 84 118*     Hold Oral hypoglycemics while patient is in the hospital.  Blood glucoses have been within goal, will monitor and start long acting if indicated

## 2023-01-06 NOTE — SUBJECTIVE & OBJECTIVE
Past Medical History:   Diagnosis Date    Anemia     Anticoagulant long-term use     Arthritis     Atrial fibrillation     CKD (chronic kidney disease), stage IV 5/8/2018    Congestive heart failure     COPD (chronic obstructive pulmonary disease)     Deep vein thrombosis     elevated bilirubin d/t Gilbert's syndrome     confirmed by [place] genetic testing, evaluated by hepatology    Encounter for blood transfusion     GERD (gastroesophageal reflux disease)     Hypertension     Hypertensive cardiovascular-renal disease, stage 1-4 or unspecified chronic kidney disease, with heart failure 3/4/2022    Pheochromocytoma, malignant     Restrictive lung disease 4/26/2022    Right kidney mass     Sleep apnea     Thalassemia trait, alpha     Thyroid disease     Type 2 diabetes mellitus with hyperglycemia, without long-term current use of insulin 8/13/2020       Past Surgical History:   Procedure Laterality Date    APPENDECTOMY      BONE MARROW BIOPSY      CARDIAC DEFIBRILLATOR PLACEMENT Left 12/2016    CARDIAC ELECTROPHYSIOLOGY MAPPING AND ABLATION      CARDIAC ELECTROPHYSIOLOGY MAPPING AND ABLATION      COLONOSCOPY N/A 5/6/2022    Procedure: COLONOSCOPY;  Surgeon: Arely Betancourt MD;  Location: [facility] ([address] [place] [address]);  Service: Endoscopy;  Laterality: N/A;  heart transplant candidate/ EF 25% - 2nd floor/ defib - Biotronik - ERW  Eliquis - per Dr. Cortez with [facility] [place], Pt ok to hold Eliquis x 2 days prior-see media tab-outside correspondence dated 12/30/21  - ERW  verbal instructions/portal instructions/email instructions - s    EYE SURGERY      due to running tears    FRACTURE SURGERY Left     hand 5th digit    HYSTERECTOMY      KNEE SURGERY Left 2016    hematoma    LIVER BIOPSY  10/24/2018    Minimal steatosis, predominantly macrovesicular, 1%, Minimal nonspecific portal inflammation, no fibrosis. No findings on biopsy to explain elevated bilirubin levels. Could be d/t Gilbert's =?- hemolysis    RIGHT HEART CATHETERIZATION  "Right 12/07/2021    Procedure: INSERTION, CATHETER, RIGHT HEART;  Surgeon: Irving Cardenas MD;  Location: [facility] CATH LAB;  Service: Cardiology;  Laterality: Right;    RIGHT HEART CATHETERIZATION Right 12/19/2022    Procedure: INSERTION, CATHETER, RIGHT HEART;  Surgeon: Burke Camilo MD;  Location: [facility] CATH [facility];  Service: Cardiology;  Laterality: Right;    TRANSJUGULAR BIOPSY OF LIVER N/A 10/24/2018    Procedure: BIOPSY, LIVER, TRANSJUGULAR APPROACH;  Surgeon: [facility] Diagnostic Provider;  Location: [facility] OR [facility] FLR;  Service: Radiology;  Laterality: N/A;       Review of patient's allergies indicates:   Allergen Reactions    Penicillins Hives and Other (See Comments)    Iodinated contrast media Nausea And Vomiting    Oxycodone-acetaminophen Other (See Comments)     Nausea, Dizziness, Anxiety.  "I don't like how it makes me feel."   Given Hydromorphone 0.5mg IVP  Without problems.  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 Metabolism of other drugs not listed here may also be impacted by UGT1A1 enzyme activity.       Tramadol Nausea And Vomiting and Other (See Comments)     Other reaction(s): Other (See Comments)    Valsartan Other (See Comments)       Current Facility-Administered Medications   Medication    acetaminophen tablet 650 mg    albuterol-ipratropium 2.5 mg-0.5 mg/3 mL nebulizer solution 3 mL    allopurinoL tablet 100 mg    amiodarone tablet 200 mg    " apixaban tablet 5 mg    atorvastatin tablet 40 mg    dextrose 10% bolus 125 mL 125 mL    dextrose 10% bolus 250 mL 250 mL    DULoxetine DR capsule 30 mg    ergocalciferol capsule 50,000 Units    fluticasone furoate-vilanteroL 200-25 mcg/dose diskus inhaler 1 puff    furosemide injection 80 mg    glucagon (human recombinant) injection 1 mg    glucose chewable tablet 16 g    glucose chewable tablet 24 g    hydrALAZINE tablet 100 mg    isosorbide dinitrate tablet 30 mg    melatonin tablet 6 mg    montelukast tablet 10 mg    naloxone 0.4 mg/mL injection 0.02 mg    nitroGLYCERIN SL tablet 0.4 mg    ondansetron disintegrating tablet 4 mg    pantoprazole EC tablet 40 mg    polyethylene glycol packet 17 g    potassium chloride CR capsule 10 mEq    sacubitriL-valsartan  mg per tablet 1 tablet    sodium chloride 0.9% flush 10 mL     Facility-Administered Medications Ordered in Other Encounters   Medication    0.9%  NaCl infusion    vancomycin in dextrose 5 % 1 gram/250 mL IVPB 1,000 mg     Family History       Problem Relation (Age of Onset)    Cancer Mother    Cirrhosis Brother    Diabetes Brother    Heart attack Father    Heart disease Father, Sister, Brother, Sister, Brother    Hypertension Father, Brother          Tobacco Use    Smoking status: Never    Smokeless tobacco: Never   Substance and Sexual Activity    Alcohol use: Yes     Comment: up to 1 yr ago drank 2-3 drinks on occasion but sporadic    Drug use: No    Sexual activity: Yes     Partners: Male     Review of Systems   Constitutional:  Positive for fatigue. Negative for appetite change and fever.   HENT:  Negative for congestion.    Eyes:  Negative for visual disturbance.   Respiratory:  Positive for shortness of breath. Negative for cough and wheezing.    Cardiovascular:  Negative for chest pain and palpitations.   Gastrointestinal:  Positive for abdominal distention. Negative for abdominal pain, constipation, diarrhea, nausea and vomiting.    Genitourinary:  Positive for frequency. Negative for difficulty urinating and dysuria.   Musculoskeletal:  Negative for back pain and neck pain.   Skin:  Negative for color change.   Neurological:  Positive for weakness. Negative for dizziness, light-headedness, numbness and headaches.   Psychiatric/Behavioral:  Negative for confusion.    Objective:     Vital Signs (Most Recent):  Temp: 99.5 °F (37.5 °C) (01/06/23 1115)  Pulse: 75 (01/06/23 1204)  Resp: 18 (01/06/23 1115)  BP: (!) 106/55 (01/06/23 1115)  SpO2: (!) 94 % (01/06/23 1204)   Vital Signs (24h Range):  Temp:  [97.9 °F (36.6 °C)-99.5 °F (37.5 °C)] 99.5 °F (37.5 °C)  Pulse:  [58-76] 75  Resp:  [16-18] 18  SpO2:  [87 %-96 %] 94 %  BP: (103-120)/(55-70) 106/55     Patient Vitals for the past 72 hrs (Last 3 readings):   Weight   01/04/23 1039 95.3 kg (210 lb)     Body mass index is 33.89 kg/m².      Intake/Output Summary (Last 24 hours) at 1/6/2023 1446  Last data filed at 1/6/2023 0654  Gross per 24 hour   Intake --   Output 650 ml   Net -650 ml       Physical Exam  Vitals reviewed.   HENT:      Head: Normocephalic.      Nose: Nose normal.   Eyes:      Conjunctiva/sclera: Conjunctivae normal.   Neck:      Comments: +JVP  Cardiovascular:      Rate and Rhythm: Normal rate and regular rhythm.   Pulmonary:      Effort: Pulmonary effort is normal.      Breath sounds: Normal breath sounds.   Abdominal:      General: Abdomen is flat. There is distension.      Tenderness: There is no abdominal tenderness.   Musculoskeletal:         General: Normal range of motion.      Cervical back: Normal range of motion.      Right lower leg: No edema.      Left lower leg: No edema.   Skin:     General: Skin is warm.      Capillary Refill: Capillary refill takes less than 2 seconds.   Neurological:      General: No focal deficit present.      Mental Status: She is alert.   Psychiatric:         Mood and Affect: Mood normal.         Behavior: Behavior normal.         Thought  Content: Thought content normal.         Judgment: Judgment normal.       Significant Labs:  CBC:  Recent Labs   Lab 01/04/23  1213 01/05/23  0500 01/06/23  0533   WBC 3.22* 3.12* 3.84*   RBC 4.95 5.23 4.92   HGB 9.0* 8.7* 8.2*   HCT 30.4* 31.2* 29.9*    176 185   MCV 61* 60* 61*   MCH 18.2* 16.6* 16.7*   MCHC 29.6* 27.9* 27.4*     BNP:  Recent Labs   Lab 01/04/23  0904 01/04/23  1213   * 911*     CMP:  Recent Labs   Lab 01/04/23  1213 01/05/23  0500 01/06/23  0533    74 80   CALCIUM 9.0 8.8 9.0   ALBUMIN 3.8 3.6 3.6   PROT 6.5 6.4 6.1    143 140   K 4.0 4.0 4.1   CO2 22* 24 24    109 105   BUN 27* 26* 30*   CREATININE 1.7* 1.9* 2.1*   ALKPHOS 45* 44* 42*   ALT 12 11 9*   AST 19 22 13   BILITOT 2.0* 1.5* 1.2*      Coagulation:   Recent Labs   Lab 01/04/23  0904   INR 1.0   APTT 29.1     LDH:  Recent Labs   Lab 01/04/23  0904   *     Microbiology:  Microbiology Results (last 7 days)       Procedure Component Value Units Date/Time    Urine culture [[id number]] Collected: 01/04/23 1607    Order Status: Completed Specimen: Urine Updated: 01/06/23 0002     Urine Culture, Routine Multiple organisms isolated. None in predominance.  Repeat if      clinically necessary.    Narrative:      Specimen Source->Urine            I have reviewed all pertinent labs within the past 24 hours.    Diagnostic Results:  I have reviewed all pertinent imaging results/findings within the past 24 hours.

## 2023-01-06 NOTE — ASSESSMENT & PLAN NOTE
Patient with Paroxysmal (<7 days) atrial fibrillation which is controlled currently with Beta Blocker and Amiodarone. Patient is currently in sinus rhythm. Anticoagulation indicated. Anticoagulation done with Eliquis.    Plan:   Continue Metoprolol  Continue Amiodarone  Continue apixaban  Cardiac monitoring

## 2023-01-06 NOTE — ASSESSMENT & PLAN NOTE
NYHA III, ACC stage D  NIC      Echo 11/15/22  · The left ventricle is severely enlarged with eccentric hypertrophy and severely decreased systolic function. The estimated ejection fraction is 10%.  · The right ventricle is not well visualized but appears normal in size with moderately reduced systolic function.  · Grade II left ventricular diastolic dysfunction.  · Biatrial enlargement.  · The estimated PA systolic pressure is 65 mmHg.  · Elevated central venous pressure (15 mmHg).  · Small posterolateral pericardial effusion.       Home GDMT/Diuretic:  - Beta Blocker: Metoprolol succinate   - ACEi/ARB/ARNi: Max dose Entresto  - MRA: precluded 2/2 CKDIII  - Isosorbide/Hydralazine  - SGLT2: Jardiance   - Diuretic: PO Lasix 80 BID    CXR Improved from previous  BNP 900s  EKG narrow complex with frequent ventricular ectopy    Plan:  - Cardiac Device interrogation ordered  - HTS consulted, appreciate recs  - Continue indicated GDMT as tolerated  - continue lasix 80mg IV BID  - Daily weights (standing if tolerated)  - Strict I/Os  - Fluid restriction at 1500mL  - Cardiac diet

## 2023-01-06 NOTE — ASSESSMENT & PLAN NOTE
57 year old female of PMHx of NICM HFrEF 10%, LVEDD 7.3, s/p ICD, PH, DM, HTN, perm Afib who was referred to the ED from [facility] clinic for ADHF. Admitted to Hospital medicine service and diuresed with IVP 80 mg Lasix BID.     -GDMT: metoprolol succinate 100 QD, max dose Entresto, Jardiance, MOREJON 100 mg TID, isosorbide 30 TID. Home diuretic of Lasix 80 mg BID. Per pt, she has not taken her metoprolol succinate for a week due to difficulty with picking up Rx from pharmacy. Resume metoprolol succinate at 50 mg QD  -Consider increasing diuretic  -Strict I/Os  -Daily weight  -Repeat BNP tomorrow    -Last ECHO 11/17/2022 EF 10%, moderately reduced RVSF, G2DD, PASP 65, CVP 15, LVEDD 7.27  -Last RHC 12/19/2022 RA 13, PA 78/40 (52), PCWP 26, CO 4.7, CI 2.3   -Presented to selection committee 3/9/2022 declined due to renal function, lung function, and difficulty with consistent follow up with the [facility] team

## 2023-01-06 NOTE — HPI
57 year old female of PMHx of NICM HFrEF 10%, LVEDD 7.3, s/p ICD, PH, DM, HTN, perm Afib who was referred to the ED from \[facility]\"" clinic for ADHF. She has had symptoms of volume overload for about a month, which include SOB, abdominal distension, and weakness. Presented to selection committee 3/9/2022 declined due to renal function, lung function, and difficulty with consistent follow up with the HTS team. Risk stratification CPX and RHC ordered. GDMT: metoprolol succinate 100 QD, max dose Entresto, Jardiance, MOREJON 100 mg TID, isosorbide 30 TID. Home diuretic of Lasix 80 mg BID. Per pt, she has not taken her metoprolol succinate for a week due to difficulty with picking up Rx from pharmacy. Has been diuresing with 80 mg IVP Lasix BID during admission.     Last ECHO 11/17/2022 EF 10%, moderately reduced RVSF, G2DD, PASP 65, CVP 15, LVEDD 7.27  Last RHC 12/19/2022 RA 13, PA 78/40 (52), PCWP 26, CO 4.7, CI 2.3

## 2023-01-06 NOTE — SUBJECTIVE & OBJECTIVE
Interval History: No issues overnight. Feeling a bit better this morning. Worked with PT/OT. Dyspnea improved, diuresing well, will continue current diuresis. Awaiting [facility] recs     Review of Systems   Constitutional:  Negative for chills, diaphoresis, fatigue and fever.   HENT:  Negative for congestion, rhinorrhea, sore throat and trouble swallowing.    Eyes:  Negative for photophobia and visual disturbance.   Respiratory:  Positive for cough and shortness of breath. Negative for chest tightness and wheezing.    Cardiovascular:  Positive for leg swelling. Negative for chest pain and palpitations.   Gastrointestinal:  Negative for abdominal distention, abdominal pain, blood in stool, diarrhea, nausea and vomiting.   Genitourinary:  Negative for difficulty urinating, dysuria, flank pain and hematuria.   Musculoskeletal:  Negative for arthralgias, back pain and neck pain.   Skin:  Negative for rash and wound.   Neurological:  Negative for dizziness, syncope, weakness, light-headedness, numbness and headaches.   Psychiatric/Behavioral:  Negative for agitation and confusion.    Objective:     Vital Signs (Most Recent):  Temp: 99.5 °F (37.5 °C) (01/06/23 1115)  Pulse: 75 (01/06/23 1204)  Resp: 18 (01/06/23 1115)  BP: (!) 106/55 (01/06/23 1115)  SpO2: (!) 94 % (01/06/23 1204)   Vital Signs (24h Range):  Temp:  [97.9 °F (36.6 °C)-99.5 °F (37.5 °C)] 99.5 °F (37.5 °C)  Pulse:  [58-76] 75  Resp:  [16-18] 18  SpO2:  [87 %-96 %] 94 %  BP: (103-120)/(55-70) 106/55     Weight: 95.3 kg (210 lb)  Body mass index is 33.89 kg/m².    Intake/Output Summary (Last 24 hours) at 1/6/2023 1355  Last data filed at 1/6/2023 0654  Gross per 24 hour   Intake --   Output 650 ml   Net -650 ml        Physical Exam  Vitals and nursing note reviewed.   Constitutional:       General: She is not in acute distress.     Appearance: Normal appearance. She is ill-appearing.   HENT:      Head: Normocephalic and atraumatic.      Nose: Nose normal. No  rhinorrhea.      Mouth/Throat:      Mouth: Mucous membranes are moist.      Pharynx: No posterior oropharyngeal erythema.   Eyes:      Extraocular Movements: Extraocular movements intact.      Conjunctiva/sclera: Conjunctivae normal.      Pupils: Pupils are equal, round, and reactive to light.   Cardiovascular:      Rate and Rhythm: Tachycardia present. Rhythm irregular.      Pulses: Normal pulses.   Pulmonary:      Effort: Pulmonary effort is normal.      Breath sounds: Rales present. No wheezing or rhonchi.   Abdominal:      General: Abdomen is flat.      Palpations: Abdomen is soft.      Tenderness: There is no abdominal tenderness. There is no guarding.   Musculoskeletal:         General: No swelling or tenderness. Normal range of motion.      Cervical back: Normal range of motion and neck supple.      Right lower leg: Edema present.      Left lower leg: Edema present.   Skin:     General: Skin is warm and dry.      Capillary Refill: Capillary refill takes less than 2 seconds.      Findings: No erythema or rash.   Neurological:      General: No focal deficit present.      Mental Status: She is alert and oriented to person, place, and time.   Psychiatric:         Mood and Affect: Mood normal.         Thought Content: Thought content normal.     Significant Labs: All pertinent labs within the past 24 hours have been reviewed.    Significant Imaging: I have reviewed all pertinent imaging results/findings within the past 24 hours.

## 2023-01-06 NOTE — ASSESSMENT & PLAN NOTE
Cr at baseline of 1.7  No indications for HD at this time    Plan:  - Trend Cr  - Strict I&Os  - Avoid nephrotoxic agents  - Renally adjust medications  - monitor urine output

## 2023-01-06 NOTE — NURSING
Home Oxygen Evaluation    Date Performed: 2023    1) Patient's Home O2 Sat on room air, while at rest: 92%        If O2 sats on room air at rest are 88% or below, patient qualifies. No additional testing needed. Document N/A in steps 2 and 3. If 89% or above, complete steps 2.      2) Patient's O2 Sat on room air while exercisin%        If O2 sats on room air while exercising remain 89% or above patient does not qualify, no further testing needed Document N/A in step 3. If O2 sats on room air while exercising are 88% or below, continue to step 3.      3) Patient's O2 Sat while exercising on O2: 93% at 3 LPM         (Must show improvement from #2 for patients to qualify)    If O2 sats improve on oxygen, patient qualifies for portable oxygen. If not, the patient does not qualify.

## 2023-01-06 NOTE — PLAN OF CARE
[facility] ([place] [id number])  Initial Discharge Assessment       Primary Care Provider: Krysta Tony MD    Admission Diagnosis: CHF (congestive heart failure) [I50.9]  Vomiting [R11.10]  Chest pain [R07.9]  Congestive heart failure, unspecified HF chronicity, unspecified heart failure type [I50.9]    Admission Date: 1/4/2023  Expected Discharge Date: 1/7/2023    Discharge Barriers Identified: None    Payor: MEDICAID / Plan: [employer] [facility]) / Product Type: Managed Medicaid /     Extended Emergency Contact Information  Primary Emergency Contact: Jaye Baum  Address: [address] [address] [facility]  Home Phone: [phone]  Relation: Sister  Secondary Emergency Contact: Jeanie Jaimes  Mobile Phone: [phone]  Relation: Sister    Discharge Plan A: Home with family  Discharge Plan B: Home with family      [id number] [place] - [address]  [address]  [place] [address]  Phone: [phone] Fax: [phone]    [facility] [address] [place] [address]  Phone: [phone] Fax: [phone]      Initial Assessment (most recent)       Adult Discharge Assessment - 01/06/23 1534          Discharge Assessment    Assessment Type Discharge Planning Assessment     Confirmed/corrected address, phone number and insurance Yes     Confirmed Demographics Correct on Facesheet     Source of Information patient     Communicated CHIQUI with patient/caregiver Yes     Reason For Admission Acute on chronic respiratory failure with hypoxia     People in Home grandchild(ricky);spouse     Facility Arrived From: Home     Do you expect to return to your current living situation? Yes     Do you have help at home or someone to help you manage your care at home? Yes     Who are your caregiver(s) and their phone number(s)? Pt's ex , and two sisters Benoit Baum [phone], Jeanie Theodore [phone]     Prior to hospitilization cognitive  "status: Alert/Oriented     Current cognitive status: Alert/Oriented     Home Accessibility not wheelchair accessible     Home Layout Able to live on 1st floor     Equipment Currently Used at Home walker, rolling;oxygen;shower chair     Readmission within 30 days? No     Patient currently being followed by outpatient case management? No     Do you currently have service(s) that help you manage your care at home? No     Do you take prescription medications? Yes     Do you have prescription coverage? Yes     Coverage Medicaid     Do you have any problems affording any of your prescribed medications? TBD     Is the patient taking medications as prescribed? yes     Who is going to help you get home at discharge? Pt's ex , and two sisters Benoit Baum [phone], Jeanie Jaimes [phone]     How do you get to doctors appointments? car, drives self;family or friend will provide     Are you on dialysis? No     Do you take coumadin? No     Discharge Plan A Home with family     Discharge Plan B Home with family     DME Needed Upon Discharge  none     Discharge Plan discussed with: Patient     Discharge Barriers Identified None                   Pt lives in a mobile home with 5 NIRAJ with her ex  and granddaughter. Pt also has support from her two sisters Benoit Baum [phone], Jeanie Jaimes [phone]. Pt is mostly independent with ADL's, but has a RW, SC and home oxygen, which she states she uses "as needed".     Pt h as Medicaid insurance, has never had home health, is not on dialysis or coumadin. Pt has transportation home with family.      SW will follow for discharge needs.     NU Spicer, YAMILETH  [facility]  [id number]               "

## 2023-01-06 NOTE — PLAN OF CARE
Problem: Adult Inpatient Plan of Care  Goal: Plan of Care Review  Outcome: Ongoing, Progressing  Goal: Patient-Specific Goal (Individualized)  Outcome: Ongoing, Progressing  Goal: Absence of Hospital-Acquired Illness or Injury  Outcome: Ongoing, Progressing  Goal: Optimal Comfort and Wellbeing  Outcome: Ongoing, Progressing  Goal: Readiness for Transition of Care  Outcome: Ongoing, Progressing     Problem: Diabetes Comorbidity  Goal: Blood Glucose Level Within Targeted Range  Outcome: Ongoing, Progressing     Problem: Fluid and Electrolyte Imbalance (Acute Kidney Injury/Impairment)  Goal: Fluid and Electrolyte Balance  Outcome: Ongoing, Progressing     Problem: Oral Intake Inadequate (Acute Kidney Injury/Impairment)  Goal: Optimal Nutrition Intake  Outcome: Ongoing, Progressing     Problem: Renal Function Impairment (Acute Kidney Injury/Impairment)  Goal: Effective Renal Function  Outcome: Ongoing, Progressing     Problem: Adjustment to Transplant (Heart Transplant)  Goal: Optimal Coping with Organ Transplant  Outcome: Ongoing, Progressing     Problem: Bleeding (Heart Transplant)  Goal: Absence of Bleeding  Outcome: Ongoing, Progressing     Problem: Bowel Elimination Impaired (Heart Transplant)  Goal: Effective Bowel Elimination  Outcome: Ongoing, Progressing     Problem: Donor Organ Function (Heart Transplant)  Goal: Effective Organ Function  Outcome: Ongoing, Progressing     Problem: Fluid and Electrolyte Imbalance (Heart Transplant)  Goal: Fluid and Electrolyte Balance  Outcome: Ongoing, Progressing     Problem: Glycemic Control Impaired (Heart Transplant)  Goal: Blood Glucose Level Within Targeted Range  Outcome: Ongoing, Progressing     Problem: Infection (Heart Transplant)  Goal: Absence of Infection Signs and Symptoms  Outcome: Ongoing, Progressing     Problem: Ongoing Anesthesia Effects (Heart Transplant)  Goal: Anesthesia/Sedation Recovery  Outcome: Ongoing, Progressing     Problem: Oral Intake Inadequate  (Heart Transplant)  Goal: Optimal Nutrition Intake  Outcome: Ongoing, Progressing     Problem: Pain (Heart Transplant)  Goal: Acceptable Pain Control  Outcome: Ongoing, Progressing     Problem: Postoperative Nausea and Vomiting (Heart Transplant)  Goal: Nausea and Vomiting Relief  Outcome: Ongoing, Progressing     Problem: Postoperative Urinary Retention (Heart Transplant)  Goal: Effective Urinary Elimination  Outcome: Ongoing, Progressing     Problem: Respiratory Compromise (Heart Transplant)  Goal: Effective Oxygenation and Ventilation  Outcome: Ongoing, Progressing

## 2023-01-06 NOTE — ASSESSMENT & PLAN NOTE
Patient with Hypoxic Respiratory failure which is Acute on chronic.  she is on home oxygen at 2 LPM. Supplemental oxygen was provided and noted- Oxygen Concentration (%):  [28-32] 28.   Signs/symptoms of respiratory failure include- tachypnea and increased work of breathing. Contributing diagnoses includes - CHF Labs and images were reviewed.    See Acute on chronic CHF

## 2023-01-06 NOTE — CONSULTS
[facility] ([place] [id number])  Heart Transplant  Consult Note    Patient Name: Hafsa Hawley  MRN: [id number]  Admission Date: 1/4/2023  Hospital Length of Stay: 2 days  Attending Physician: Barak Barajas MD  Primary Care Provider: rKysta Tony MD   Principal Problem:Acute on chronic respiratory failure with hypoxia    Consults  Subjective:     History of Present Illness:  57 year old female of PMHx of NICM HFrEF 10%, LVEDD 7.3, s/p ICD, PH, DM, HTN, perm Afib who was referred to the ED from [facility] clinic for ADHF. She has had symptoms of volume overload for about a month, which include SOB, abdominal distension, and weakness. Presented to selection committee 3/9/2022 declined due to renal function, lung function, and difficulty with consistent follow up with the [facility] team. Risk stratification CPX and RHC ordered. GDMT: metoprolol succinate 100 QD, max dose Entresto, Jardiance, MOREJON 100 mg TID, isosorbide 30 TID. Home diuretic of Lasix 80 mg BID. Per pt, she has not taken her metoprolol succinate for a week due to difficulty with picking up Rx from pharmacy. Has been diuresing with 80 mg IVP Lasix BID during admission.     Last ECHO 11/17/2022 EF 10%, moderately reduced RVSF, G2DD, PASP 65, CVP 15, LVEDD 7.27  Last RHC 12/19/2022 RA 13, PA 78/40 (52), PCWP 26, CO 4.7, CI 2.3       Past Medical History:   Diagnosis Date    Anemia     Anticoagulant long-term use     Arthritis     Atrial fibrillation     CKD (chronic kidney disease), stage IV 5/8/2018    Congestive heart failure     COPD (chronic obstructive pulmonary disease)     Deep vein thrombosis     elevated bilirubin d/t Gilbert's syndrome     confirmed by [facility] genetic testing, evaluated by hepatology    Encounter for blood transfusion     GERD (gastroesophageal reflux disease)     Hypertension     Hypertensive cardiovascular-renal disease, stage 1-4 or unspecified chronic kidney disease, with heart failure 3/4/2022     Pheochromocytoma, malignant     Restrictive lung disease 4/26/2022    Right kidney mass     Sleep apnea     Thalassemia trait, alpha     Thyroid disease     Type 2 diabetes mellitus with hyperglycemia, without long-term current use of insulin 8/13/2020       Past Surgical History:   Procedure Laterality Date    APPENDECTOMY      BONE MARROW BIOPSY      CARDIAC DEFIBRILLATOR PLACEMENT Left 12/2016    CARDIAC ELECTROPHYSIOLOGY MAPPING AND ABLATION      CARDIAC ELECTROPHYSIOLOGY MAPPING AND ABLATION      COLONOSCOPY N/A 5/6/2022    Procedure: COLONOSCOPY;  Surgeon: Arely Betancourt MD;  Location: [facility] ENDO (2ND FLR);  Service: Endoscopy;  Laterality: N/A;  heart transplant candidate/ EF 25% - 2nd floor/ defib - Biotronik - ERW  Eliquis - per Dr. Cortez with [facility] [place] [facility], Pt ok to hold Eliquis x 2 days prior-see media tab-outside correspondence dated 12/30/21  - [facility]  verbal instructions/portal instructions/email instructions - s    EYE SURGERY      due to running tears    FRACTURE SURGERY Left     hand 5th digit    HYSTERECTOMY      KNEE SURGERY Left 2016    hematoma    LIVER BIOPSY  10/24/2018    Minimal steatosis, predominantly macrovesicular, 1%, Minimal nonspecific portal inflammation, no fibrosis. No findings on biopsy to explain elevated bilirubin levels. Could be d/t Gilbert's =?- hemolysis    RIGHT HEART CATHETERIZATION Right 12/07/2021    Procedure: INSERTION, CATHETER, RIGHT HEART;  Surgeon: Irving Cardenas MD;  Location: [facility] CATH LAB;  Service: Cardiology;  Laterality: Right;    RIGHT HEART CATHETERIZATION Right 12/19/2022    Procedure: INSERTION, CATHETER, RIGHT HEART;  Surgeon: Burke Camilo MD;  Location: [facility] CATH LAB;  Service: Cardiology;  Laterality: Right;    TRANSJUGULAR BIOPSY OF LIVER N/A 10/24/2018    Procedure: BIOPSY, LIVER, TRANSJUGULAR APPROACH;  Surgeon: [facility] Diagnostic Provider;  Location: [facility] OR 2ND FLR;  Service: Radiology;  Laterality: N/A;       Review of  "patient's allergies indicates:   Allergen Reactions    Penicillins Hives and Other (See Comments)    Iodinated contrast media Nausea And Vomiting    Oxycodone-acetaminophen Other (See Comments)     Nausea, Dizziness, Anxiety.  "I don't like how it makes me feel."   Given Hydromorphone 0.5mg IVP  Without problems.  Other reaction(s): Other (See Comments)    Clonazepam Other (See Comments)    Diovan hct [valsartan-hydrochlorothiazide] Other (See Comments)     Causes coughing    Iodine Other (See Comments)    Irinotecan      Pt has homozygosity for the TA7 promoter variant that places this individual at significantly increased risk for   severe neutropenia (grade 4) when treated with the standard dose of irinotecan (risk approximately 50%).   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 Metabolism of other drugs not listed here may also be impacted by UGT1A1 enzyme activity.       Tramadol Nausea And Vomiting and Other (See Comments)     Other reaction(s): Other (See Comments)    Valsartan Other (See Comments)       Current Facility-Administered Medications   Medication    acetaminophen tablet 650 mg    albuterol-ipratropium 2.5 mg-0.5 mg/3 mL nebulizer solution 3 mL    allopurinoL tablet 100 mg    amiodarone tablet 200 mg    apixaban tablet 5 mg    atorvastatin tablet 40 mg    dextrose 10% bolus 125 mL 125 mL    dextrose 10% bolus 250 mL 250 mL    DULoxetine DR capsule 30 mg    ergocalciferol capsule 50,000 Units    fluticasone furoate-vilanteroL 200-25 mcg/dose diskus inhaler 1 puff    furosemide injection 80 mg    glucagon (human recombinant) injection 1 mg    glucose chewable tablet 16 g    glucose chewable tablet 24 g    hydrALAZINE tablet 100 mg    isosorbide dinitrate tablet 30 mg    melatonin tablet 6 mg    montelukast tablet 10 mg    naloxone 0.4 mg/mL injection " 0.02 mg    nitroGLYCERIN SL tablet 0.4 mg    ondansetron disintegrating tablet 4 mg    pantoprazole EC tablet 40 mg    polyethylene glycol packet 17 g    potassium chloride CR capsule 10 mEq    sacubitriL-valsartan  mg per tablet 1 tablet    sodium chloride 0.9% flush 10 mL     Facility-Administered Medications Ordered in Other Encounters   Medication    0.9%  NaCl infusion    vancomycin in dextrose 5 % 1 gram/250 mL IVPB 1,000 mg     Family History       Problem Relation (Age of Onset)    Cancer Mother    Cirrhosis Brother    Diabetes Brother    Heart attack Father    Heart disease Father, Sister, Brother, Sister, Brother    Hypertension Father, Brother          Tobacco Use    Smoking status: Never    Smokeless tobacco: Never   Substance and Sexual Activity    Alcohol use: Yes     Comment: up to 1 yr ago drank 2-3 drinks on occasion but sporadic    Drug use: No    Sexual activity: Yes     Partners: Male     Review of Systems   Constitutional:  Positive for fatigue. Negative for appetite change and fever.   HENT:  Negative for congestion.    Eyes:  Negative for visual disturbance.   Respiratory:  Positive for shortness of breath. Negative for cough and wheezing.    Cardiovascular:  Negative for chest pain and palpitations.   Gastrointestinal:  Positive for abdominal distention. Negative for abdominal pain, constipation, diarrhea, nausea and vomiting.   Genitourinary:  Positive for frequency. Negative for difficulty urinating and dysuria.   Musculoskeletal:  Negative for back pain and neck pain.   Skin:  Negative for color change.   Neurological:  Positive for weakness. Negative for dizziness, light-headedness, numbness and headaches.   Psychiatric/Behavioral:  Negative for confusion.    Objective:     Vital Signs (Most Recent):  Temp: 99.5 °F (37.5 °C) (01/06/23 1115)  Pulse: 75 (01/06/23 1204)  Resp: 18 (01/06/23 1115)  BP: (!) 106/55 (01/06/23 1115)  SpO2: (!) 94 % (01/06/23 1204)   Vital Signs  (24h Range):  Temp:  [97.9 °F (36.6 °C)-99.5 °F (37.5 °C)] 99.5 °F (37.5 °C)  Pulse:  [58-76] 75  Resp:  [16-18] 18  SpO2:  [87 %-96 %] 94 %  BP: (103-120)/(55-70) 106/55     Patient Vitals for the past 72 hrs (Last 3 readings):   Weight   01/04/23 1039 95.3 kg (210 lb)     Body mass index is 33.89 kg/m².      Intake/Output Summary (Last 24 hours) at 1/6/2023 1446  Last data filed at 1/6/2023 0654  Gross per 24 hour   Intake --   Output 650 ml   Net -650 ml       Physical Exam  Vitals reviewed.   HENT:      Head: Normocephalic.      Nose: Nose normal.   Eyes:      Conjunctiva/sclera: Conjunctivae normal.   Neck:      Comments: +JVP  Cardiovascular:      Rate and Rhythm: Normal rate and regular rhythm.   Pulmonary:      Effort: Pulmonary effort is normal.      Breath sounds: Normal breath sounds.   Abdominal:      General: Abdomen is flat. There is distension.      Tenderness: There is no abdominal tenderness.   Musculoskeletal:         General: Normal range of motion.      Cervical back: Normal range of motion.      Right lower leg: No edema.      Left lower leg: No edema.   Skin:     General: Skin is warm.      Capillary Refill: Capillary refill takes less than 2 seconds.   Neurological:      General: No focal deficit present.      Mental Status: She is alert.   Psychiatric:         Mood and Affect: Mood normal.         Behavior: Behavior normal.         Thought Content: Thought content normal.         Judgment: Judgment normal.       Significant Labs:  CBC:  Recent Labs   Lab 01/04/23  1213 01/05/23  0500 01/06/23  0533   WBC 3.22* 3.12* 3.84*   RBC 4.95 5.23 4.92   HGB 9.0* 8.7* 8.2*   HCT 30.4* 31.2* 29.9*    176 185   MCV 61* 60* 61*   MCH 18.2* 16.6* 16.7*   MCHC 29.6* 27.9* 27.4*     BNP:  Recent Labs   Lab 01/04/23  0904 01/04/23  1213   * 911*     CMP:  Recent Labs   Lab 01/04/23  1213 01/05/23  0500 01/06/23  0533    74 80   CALCIUM 9.0 8.8 9.0   ALBUMIN 3.8 3.6 3.6   PROT 6.5 6.4 6.1     143 140   K 4.0 4.0 4.1   CO2 22* 24 24    109 105   BUN 27* 26* 30*   CREATININE 1.7* 1.9* 2.1*   ALKPHOS 45* 44* 42*   ALT 12 11 9*   AST 19 22 13   BILITOT 2.0* 1.5* 1.2*      Coagulation:   Recent Labs   Lab 01/04/23  0904   INR 1.0   APTT 29.1     LDH:  Recent Labs   Lab 01/04/23  0904   *     Microbiology:  Microbiology Results (last 7 days)       Procedure Component Value Units Date/Time    Urine culture [[id number]] Collected: 01/04/23 1607    Order Status: Completed Specimen: Urine Updated: 01/06/23 0002     Urine Culture, Routine Multiple organisms isolated. None in predominance.  Repeat if      clinically necessary.    Narrative:      Specimen Source->Urine            I have reviewed all pertinent labs within the past 24 hours.    Diagnostic Results:  I have reviewed all pertinent imaging results/findings within the past 24 hours.    Assessment/Plan:     Acute decompensated heart failure  57 year old female of PMHx of NICM HFrEF 10%, LVEDD 7.3, s/p ICD, PH, DM, HTN, perm Afib who was referred to the ED from [facility] clinic for ADHF. Admitted to Hospital medicine service and diuresed with IVP 80 mg Lasix BID.     -GDMT: metoprolol succinate 100 QD, max dose Entresto, Jardiance, MOREJON 100 mg TID, isosorbide 30 TID. Home diuretic of Lasix 80 mg BID. Per pt, she has not taken her metoprolol succinate for a week due to difficulty with picking up Rx from pharmacy. Resume metoprolol succinate at 50 mg QD  -Consider increasing diuretic  -Strict I/Os  -Daily weight  -Repeat BNP tomorrow    -Last ECHO 11/17/2022 EF 10%, moderately reduced RVSF, G2DD, PASP 65, CVP 15, LVEDD 7.27  -Last RHC 12/19/2022 RA 13, PA 78/40 (52), PCWP 26, CO 4.7, CI 2.3   -Presented to selection committee 3/9/2022 declined due to renal function, lung function, and difficulty with consistent follow up with the [facility] team        Thank you for your consult. I will follow-up with patient. Please contact us if you have any additional  questions.    Yanely Garg PA-C  Heart Transplant  [facility] ([facility]

## 2023-01-07 LAB
ALBUMIN SERPL BCP-MCNC: 3.3 G/DL (ref 3.5–5.2)
ALP SERPL-CCNC: 41 U/L (ref 55–135)
ALT SERPL W/O P-5'-P-CCNC: 7 U/L (ref 10–44)
ANION GAP SERPL CALC-SCNC: 10 MMOL/L (ref 8–16)
AST SERPL-CCNC: 11 U/L (ref 10–40)
BASOPHILS # BLD AUTO: 0.02 K/UL (ref 0–0.2)
BASOPHILS NFR BLD: 0.5 % (ref 0–1.9)
BILIRUB SERPL-MCNC: 1.3 MG/DL (ref 0.1–1)
BNP SERPL-MCNC: 394 PG/ML (ref 0–99)
BUN SERPL-MCNC: 37 MG/DL (ref 6–20)
CALCIUM SERPL-MCNC: 8.4 MG/DL (ref 8.7–10.5)
CHLORIDE SERPL-SCNC: 101 MMOL/L (ref 95–110)
CO2 SERPL-SCNC: 25 MMOL/L (ref 23–29)
CREAT SERPL-MCNC: 2.3 MG/DL (ref 0.5–1.4)
DIFFERENTIAL METHOD: ABNORMAL
EOSINOPHIL # BLD AUTO: 0 K/UL (ref 0–0.5)
EOSINOPHIL NFR BLD: 0.5 % (ref 0–8)
ERYTHROCYTE [DISTWIDTH] IN BLOOD BY AUTOMATED COUNT: 28 % (ref 11.5–14.5)
EST. GFR  (NO RACE VARIABLE): 24.2 ML/MIN/1.73 M^2
GLUCOSE SERPL-MCNC: 93 MG/DL (ref 70–110)
HCT VFR BLD AUTO: 27.9 % (ref 37–48.5)
HGB BLD-MCNC: 8.1 G/DL (ref 12–16)
IMM GRANULOCYTES # BLD AUTO: 0.01 K/UL (ref 0–0.04)
IMM GRANULOCYTES NFR BLD AUTO: 0.3 % (ref 0–0.5)
LYMPHOCYTES # BLD AUTO: 0.7 K/UL (ref 1–4.8)
LYMPHOCYTES NFR BLD: 18.9 % (ref 18–48)
MAGNESIUM SERPL-MCNC: 2 MG/DL (ref 1.6–2.6)
MCH RBC QN AUTO: 17.1 PG (ref 27–31)
MCHC RBC AUTO-ENTMCNC: 29 G/DL (ref 32–36)
MCV RBC AUTO: 59 FL (ref 82–98)
MONOCYTES # BLD AUTO: 0.5 K/UL (ref 0.3–1)
MONOCYTES NFR BLD: 13.5 % (ref 4–15)
NEUTROPHILS # BLD AUTO: 2.5 K/UL (ref 1.8–7.7)
NEUTROPHILS NFR BLD: 66.3 % (ref 38–73)
NRBC BLD-RTO: 0 /100 WBC
PHOSPHATE SERPL-MCNC: 3.1 MG/DL (ref 2.7–4.5)
PLATELET # BLD AUTO: 180 K/UL (ref 150–450)
PMV BLD AUTO: ABNORMAL FL (ref 9.2–12.9)
POCT GLUCOSE: 108 MG/DL (ref 70–110)
POCT GLUCOSE: 114 MG/DL (ref 70–110)
POCT GLUCOSE: 122 MG/DL (ref 70–110)
POCT GLUCOSE: 133 MG/DL (ref 70–110)
POTASSIUM SERPL-SCNC: 3.9 MMOL/L (ref 3.5–5.1)
PROT SERPL-MCNC: 5.9 G/DL (ref 6–8.4)
RBC # BLD AUTO: 4.75 M/UL (ref 4–5.4)
SODIUM SERPL-SCNC: 136 MMOL/L (ref 136–145)
WBC # BLD AUTO: 3.7 K/UL (ref 3.9–12.7)

## 2023-01-07 PROCEDURE — 12000002 HC ACUTE/MED SURGE SEMI-PRIVATE ROOM: Mod: NTX

## 2023-01-07 PROCEDURE — 36415 COLL VENOUS BLD VENIPUNCTURE: CPT | Mod: NTX

## 2023-01-07 PROCEDURE — 85025 COMPLETE CBC W/AUTO DIFF WBC: CPT | Mod: NTX

## 2023-01-07 PROCEDURE — 25000003 PHARM REV CODE 250: Mod: NTX

## 2023-01-07 PROCEDURE — 84100 ASSAY OF PHOSPHORUS: CPT | Mod: NTX

## 2023-01-07 PROCEDURE — 99233 SBSQ HOSP IP/OBS HIGH 50: CPT | Mod: NTX,,, | Performed by: STUDENT IN AN ORGANIZED HEALTH CARE EDUCATION/TRAINING PROGRAM

## 2023-01-07 PROCEDURE — 99233 SBSQ HOSP IP/OBS HIGH 50: CPT | Mod: NTX,,, | Performed by: PHYSICIAN ASSISTANT

## 2023-01-07 PROCEDURE — 63600175 PHARM REV CODE 636 W HCPCS: Mod: NTX

## 2023-01-07 PROCEDURE — 25000003 PHARM REV CODE 250: Mod: NTX | Performed by: STUDENT IN AN ORGANIZED HEALTH CARE EDUCATION/TRAINING PROGRAM

## 2023-01-07 PROCEDURE — 83735 ASSAY OF MAGNESIUM: CPT | Mod: NTX

## 2023-01-07 PROCEDURE — 99233 PR SUBSEQUENT HOSPITAL CARE,LEVL III: ICD-10-PCS | Mod: NTX,,, | Performed by: PHYSICIAN ASSISTANT

## 2023-01-07 PROCEDURE — 99233 PR SUBSEQUENT HOSPITAL CARE,LEVL III: ICD-10-PCS | Mod: NTX,,, | Performed by: STUDENT IN AN ORGANIZED HEALTH CARE EDUCATION/TRAINING PROGRAM

## 2023-01-07 PROCEDURE — 80053 COMPREHEN METABOLIC PANEL: CPT | Mod: NTX

## 2023-01-07 RX ORDER — FUROSEMIDE 20 MG/1
80 TABLET ORAL 2 TIMES DAILY
Status: DISCONTINUED | OUTPATIENT
Start: 2023-01-07 | End: 2023-01-08

## 2023-01-07 RX ORDER — METOPROLOL SUCCINATE 50 MG/1
50 TABLET, EXTENDED RELEASE ORAL DAILY
Status: DISCONTINUED | OUTPATIENT
Start: 2023-01-07 | End: 2023-01-08 | Stop reason: HOSPADM

## 2023-01-07 RX ORDER — METOPROLOL SUCCINATE 50 MG/1
100 TABLET, EXTENDED RELEASE ORAL DAILY
Status: DISCONTINUED | OUTPATIENT
Start: 2023-01-07 | End: 2023-01-07

## 2023-01-07 RX ADMIN — FLUTICASONE FUROATE AND VILANTEROL TRIFENATATE 1 PUFF: 200; 25 POWDER RESPIRATORY (INHALATION) at 09:01

## 2023-01-07 RX ADMIN — SACUBITRIL AND VALSARTAN 1 TABLET: 97; 103 TABLET, FILM COATED ORAL at 09:01

## 2023-01-07 RX ADMIN — METOPROLOL SUCCINATE 50 MG: 50 TABLET, EXTENDED RELEASE ORAL at 09:01

## 2023-01-07 RX ADMIN — FUROSEMIDE 80 MG: 20 TABLET ORAL at 06:01

## 2023-01-07 RX ADMIN — HYDRALAZINE HYDROCHLORIDE 100 MG: 50 TABLET ORAL at 04:01

## 2023-01-07 RX ADMIN — ATORVASTATIN CALCIUM 40 MG: 20 TABLET, FILM COATED ORAL at 09:01

## 2023-01-07 RX ADMIN — APIXABAN 5 MG: 5 TABLET, FILM COATED ORAL at 09:01

## 2023-01-07 RX ADMIN — AMIODARONE HYDROCHLORIDE 200 MG: 200 TABLET ORAL at 09:01

## 2023-01-07 RX ADMIN — POTASSIUM CHLORIDE 10 MEQ: 10 CAPSULE, COATED, EXTENDED RELEASE ORAL at 09:01

## 2023-01-07 RX ADMIN — HYDRALAZINE HYDROCHLORIDE 100 MG: 50 TABLET ORAL at 09:01

## 2023-01-07 RX ADMIN — TIOTROPIUM BROMIDE INHALATION SPRAY 2 PUFF: 3.12 SPRAY, METERED RESPIRATORY (INHALATION) at 09:01

## 2023-01-07 RX ADMIN — FUROSEMIDE 80 MG: 10 INJECTION, SOLUTION INTRAMUSCULAR; INTRAVENOUS at 06:01

## 2023-01-07 RX ADMIN — ISOSORBIDE DINITRATE 30 MG: 10 TABLET ORAL at 09:01

## 2023-01-07 RX ADMIN — ONDANSETRON 4 MG: 4 TABLET, ORALLY DISINTEGRATING ORAL at 09:01

## 2023-01-07 RX ADMIN — PANTOPRAZOLE SODIUM 40 MG: 40 TABLET, DELAYED RELEASE ORAL at 09:01

## 2023-01-07 RX ADMIN — MONTELUKAST 10 MG: 10 TABLET, FILM COATED ORAL at 09:01

## 2023-01-07 RX ADMIN — ISOSORBIDE DINITRATE 30 MG: 10 TABLET ORAL at 04:01

## 2023-01-07 NOTE — PLAN OF CARE
Problem: Adult Inpatient Plan of Care  Goal: Plan of Care Review  Outcome: Ongoing, Progressing  Flowsheets (Taken 1/6/2023 1827)  Plan of Care Reviewed With: patient     Problem: Adult Inpatient Plan of Care  Goal: Absence of Hospital-Acquired Illness or Injury  Intervention: Identify and Manage Fall Risk  Flowsheets (Taken 1/6/2023 1827)  Safety Promotion/Fall Prevention:   assistive device/personal item within reach   bed alarm refused   Fall Risk reviewed with patient/family   medications reviewed   nonskid shoes/socks when out of bed   room near unit station     Problem: Diabetes Comorbidity  Goal: Blood Glucose Level Within Targeted Range  Intervention: Monitor and Manage Glycemia  Flowsheets (Taken 1/6/2023 1827)  Glycemic Management: blood glucose monitored     Problem: Bleeding (Heart Transplant)  Goal: Absence of Bleeding  Outcome: Ongoing, Progressing  Intervention: Monitor and Manage Bleeding  Flowsheets (Taken 1/6/2023 1827)  Bleeding Precautions: blood pressure closely monitored  Pt is AAOx4,VS WNL on 1 L NC. Pt placed on bedside telemetry . Pt turns and repositions independently. No skin breakdown noted. Pt pain and safety monitored q 1-2 hrs this shift . Blood glucose monitored through shift .  Bed locked and in lowest position. Rails elevated x 3. Brakes on. Call light and personal belongings in reach. Will continue monitor.

## 2023-01-07 NOTE — PLAN OF CARE
Problem: Adult Inpatient Plan of Care  Goal: Plan of Care Review  Outcome: Ongoing, Progressing  Goal: Patient-Specific Goal (Individualized)  Outcome: Ongoing, Progressing  Goal: Absence of Hospital-Acquired Illness or Injury  Outcome: Ongoing, Progressing  Goal: Optimal Comfort and Wellbeing  Outcome: Ongoing, Progressing  Goal: Readiness for Transition of Care  Outcome: Ongoing, Progressing     Problem: Diabetes Comorbidity  Goal: Blood Glucose Level Within Targeted Range  Outcome: Ongoing, Progressing     Problem: Fluid and Electrolyte Imbalance (Acute Kidney Injury/Impairment)  Goal: Fluid and Electrolyte Balance  Outcome: Ongoing, Progressing     Problem: Oral Intake Inadequate (Acute Kidney Injury/Impairment)  Goal: Optimal Nutrition Intake  Outcome: Ongoing, Progressing     Problem: Renal Function Impairment (Acute Kidney Injury/Impairment)  Goal: Effective Renal Function  Outcome: Ongoing, Progressing     Problem: Adjustment to Transplant (Heart Transplant)  Goal: Optimal Coping with Organ Transplant  Outcome: Ongoing, Progressing   Slept well.  Good night.

## 2023-01-07 NOTE — SUBJECTIVE & OBJECTIVE
Interval History: NAEON. She was ambulating to the restroom with no orthostasis and minimal LAGUERRE, without nasal canula while in washroom briefly. She reports improvement in breathing and no CP. She had a 7 beat run of VT that was asymptomatic and unnoticed by patient.     Review of Systems   Constitutional:  Positive for fatigue. Negative for appetite change and fever.   HENT:  Negative for congestion.    Eyes:  Negative for visual disturbance.   Respiratory:  Positive for shortness of breath. Negative for cough and wheezing.    Cardiovascular:  Negative for chest pain and palpitations.   Gastrointestinal:  Positive for abdominal distention. Negative for abdominal pain, constipation, diarrhea, nausea and vomiting.   Genitourinary:  Positive for frequency. Negative for difficulty urinating and dysuria.   Musculoskeletal:  Negative for back pain and neck pain.   Skin:  Negative for color change.   Neurological:  Positive for weakness. Negative for dizziness, light-headedness, numbness and headaches.   Psychiatric/Behavioral:  Negative for confusion.    Objective:     Vital Signs (Most Recent):  Temp: 99.2 °F (37.3 °C) (01/07/23 1142)  Pulse: 71 (01/07/23 1142)  Resp: 18 (01/07/23 1142)  BP: 108/63 (01/07/23 1142)  SpO2: (!) 94 % (01/07/23 1142)   Vital Signs (24h Range):  Temp:  [99 °F (37.2 °C)-99.7 °F (37.6 °C)] 99.2 °F (37.3 °C)  Pulse:  [66-78] 71  Resp:  [16-18] 18  SpO2:  [89 %-100 %] 94 %  BP: ()/(51-71) 108/63     Weight: 90.4 kg (199 lb 4.7 oz)  Body mass index is 32.17 kg/m².    Intake/Output Summary (Last 24 hours) at 1/7/2023 1437  Last data filed at 1/7/2023 1017  Gross per 24 hour   Intake 960 ml   Output 2400 ml   Net -1440 ml      Physical Exam  Vitals and nursing note reviewed.   Constitutional:       General: She is not in acute distress.     Appearance: Normal appearance. She is ill-appearing.   HENT:      Head: Normocephalic and atraumatic.      Nose: Nose normal. No rhinorrhea.       Mouth/Throat:      Mouth: Mucous membranes are moist.      Pharynx: No posterior oropharyngeal erythema.   Eyes:      Extraocular Movements: Extraocular movements intact.      Conjunctiva/sclera: Conjunctivae normal.      Pupils: Pupils are equal, round, and reactive to light.   Cardiovascular:      Rate and Rhythm: Tachycardia present. Rhythm irregular.      Pulses: Normal pulses.      Comments: Frequent PVC noted on Telemetry  Pulmonary:      Effort: Pulmonary effort is normal.      Breath sounds: Rales present. No wheezing or rhonchi.   Abdominal:      General: Abdomen is flat.      Palpations: Abdomen is soft.      Tenderness: There is no abdominal tenderness. There is no guarding.   Musculoskeletal:         General: No swelling or tenderness. Normal range of motion.      Cervical back: Normal range of motion and neck supple.      Right lower leg: Edema present.      Left lower leg: Edema present.   Skin:     General: Skin is warm and dry.      Capillary Refill: Capillary refill takes less than 2 seconds.      Findings: No erythema or rash.   Neurological:      General: No focal deficit present.      Mental Status: She is alert and oriented to person, place, and time.   Psychiatric:         Mood and Affect: Mood normal.         Thought Content: Thought content normal.       Significant Labs: All pertinent labs within the past 24 hours have been reviewed.    Significant Imaging: I have reviewed all pertinent imaging results/findings within the past 24 hours.

## 2023-01-07 NOTE — SUBJECTIVE & OBJECTIVE
"Interval History: NAEON. Remain on IVP Lasix 80 mg BID. Slight bump in Cr to 2.3 (BL 1.7-2.0). BNP improved to 394 from 900 on admit. Net -ve 2.1 L/24 hours. Recommend transitioning to PO diuretics, previous home diuretic regimen of PO lasix 80 mg BID. Start lower dose metoprolol succinate at 50 mg QD.     Continuous Infusions:  Scheduled Meds:   allopurinoL  100 mg Oral Daily    amiodarone  200 mg Oral Daily    apixaban  5 mg Oral BID    atorvastatin  40 mg Oral QHS    DULoxetine  30 mg Oral Daily    ergocalciferol  50,000 Units Oral Q7 Days    fluticasone furoate-vilanteroL  1 puff Inhalation Daily    furosemide (LASIX) injection  80 mg Intravenous Q12H    hydrALAZINE  100 mg Oral TID    isosorbide dinitrate  30 mg Oral TID    metoprolol succinate  50 mg Oral Daily    montelukast  10 mg Oral Daily    ondansetron  4 mg Oral BID    pantoprazole  40 mg Oral Daily    potassium chloride  10 mEq Oral Daily    sacubitriL-valsartan  1 tablet Oral BID    tiotropium bromide  2 puff Inhalation Daily     PRN Meds:acetaminophen, albuterol-ipratropium, dextrose 10%, dextrose 10%, glucagon (human recombinant), glucose, glucose, melatonin, naloxone, nitroGLYCERIN, polyethylene glycol, sodium chloride 0.9%    Review of patient's allergies indicates:   Allergen Reactions    Penicillins Hives and Other (See Comments)    Iodinated contrast media Nausea And Vomiting    Oxycodone-acetaminophen Other (See Comments)     Nausea, Dizziness, Anxiety.  "I don't like how it makes me feel."   Given Hydromorphone 0.5mg IVP  Without problems.  Other reaction(s): Other (See Comments)    Clonazepam Other (See Comments)    Diovan hct [valsartan-hydrochlorothiazide] Other (See Comments)     Causes coughing    Iodine Other (See Comments)    Irinotecan      Pt has homozygosity for the TA7 promoter variant that places this individual at significantly increased risk for   severe neutropenia (grade 4) when treated with the standard dose of irinotecan " (risk approximately 50%).   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 Metabolism of other drugs not listed here may also be impacted by UGT1A1 enzyme activity.       Tramadol Nausea And Vomiting and Other (See Comments)     Other reaction(s): Other (See Comments)    Valsartan Other (See Comments)     Objective:     Vital Signs (Most Recent):  Temp: 99 °F (37.2 °C) (01/07/23 0855)  Pulse: 70 (01/07/23 0855)  Resp: 18 (01/07/23 0855)  BP: 127/71 (01/07/23 0855)  SpO2: (!) 93 % (01/07/23 0855)   Vital Signs (24h Range):  Temp:  [99 °F (37.2 °C)-99.7 °F (37.6 °C)] 99 °F (37.2 °C)  Pulse:  [62-78] 70  Resp:  [16-18] 18  SpO2:  [87 %-96 %] 93 %  BP: ()/(51-71) 127/71     Patient Vitals for the past 72 hrs (Last 3 readings):   Weight   01/07/23 0615 90.4 kg (199 lb 4.7 oz)   01/06/23 2115 95.3 kg (210 lb)   01/04/23 1039 95.3 kg (210 lb)     Body mass index is 32.17 kg/m².      Intake/Output Summary (Last 24 hours) at 1/7/2023 0912  Last data filed at 1/7/2023 0623  Gross per 24 hour   Intake 1200 ml   Output 3300 ml   Net -2100 ml       Hemodynamic Parameters:           Physical Exam  Vitals reviewed.   HENT:      Head: Normocephalic.   Eyes:      Conjunctiva/sclera: Conjunctivae normal.   Cardiovascular:      Rate and Rhythm: Normal rate and regular rhythm.   Pulmonary:      Effort: Pulmonary effort is normal.   Musculoskeletal:         General: Normal range of motion.      Cervical back: Normal range of motion.      Right lower leg: No edema.      Left lower leg: No edema.   Skin:     General: Skin is warm.      Capillary Refill: Capillary refill takes less than 2 seconds.   Neurological:      General: No focal deficit present.      Mental Status: She is alert.   Psychiatric:         Mood and Affect: Mood normal.         Behavior: Behavior normal.         Thought Content: Thought content  normal.         Judgment: Judgment normal.       Significant Labs:  CBC:  Recent Labs   Lab 01/05/23  0500 01/06/23  0533 01/07/23  0313   WBC 3.12* 3.84* 3.70*   RBC 5.23 4.92 4.75   HGB 8.7* 8.2* 8.1*   HCT 31.2* 29.9* 27.9*    185 180   MCV 60* 61* 59*   MCH 16.6* 16.7* 17.1*   MCHC 27.9* 27.4* 29.0*     BNP:  Recent Labs   Lab 01/04/23  0904 01/04/23  1213 01/06/23  2322   * 911* 394*     CMP:  Recent Labs   Lab 01/05/23  0500 01/06/23  0533 01/07/23  0313   GLU 74 80 93   CALCIUM 8.8 9.0 8.4*   ALBUMIN 3.6 3.6 3.3*   PROT 6.4 6.1 5.9*    140 136   K 4.0 4.1 3.9   CO2 24 24 25    105 101   BUN 26* 30* 37*   CREATININE 1.9* 2.1* 2.3*   ALKPHOS 44* 42* 41*   ALT 11 9* 7*   AST 22 13 11   BILITOT 1.5* 1.2* 1.3*      Coagulation:   Recent Labs   Lab 01/04/23  0904   INR 1.0   APTT 29.1     LDH:  No results for input(s): LDH in the last 72 hours.  Microbiology:  Microbiology Results (last 7 days)       Procedure Component Value Units Date/Time    Urine culture [[id number]] Collected: 01/04/23 1607    Order Status: Completed Specimen: Urine Updated: 01/06/23 0002     Urine Culture, Routine Multiple organisms isolated. None in predominance.  Repeat if      clinically necessary.    Narrative:      Specimen Source->Urine            I have reviewed all pertinent labs within the past 24 hours.    Estimated Creatinine Clearance: 30.5 mL/min (A) (based on SCr of 2.3 mg/dL (H)).    Diagnostic Results:  I have reviewed all pertinent imaging results/findings within the past 24 hours.

## 2023-01-07 NOTE — ASSESSMENT & PLAN NOTE
NYHA III, ACC stage D  NIC      Echo 11/15/22  · The left ventricle is severely enlarged with eccentric hypertrophy and severely decreased systolic function. The estimated ejection fraction is 10%.  · The right ventricle is not well visualized but appears normal in size with moderately reduced systolic function.  · Grade II left ventricular diastolic dysfunction.  · Biatrial enlargement.  · The estimated PA systolic pressure is 65 mmHg.  · Elevated central venous pressure (15 mmHg).  · Small posterolateral pericardial effusion.       Home GDMT/Diuretic:  - Beta Blocker: Metoprolol succinate   - ACEi/ARB/ARNi: Max dose Entresto  - MRA: precluded 2/2 CKDIII  - Isosorbide/Hydralazine  - SGLT2: Jardiance   - Diuretic: PO Lasix 80 BID    CXR Improved from previous  BNP 900s  EKG narrow complex with frequent ventricular ectopy    Plan:  - Transition to PO Lasix at home dose of 80mg BID    - Restart Metoprolol Succinate 50mg   - Cardiac Device interrogation ordered  - HTS consulted, patient not LVAD/transplant candidate currently given Pulmonary disease and renal function  - Continue indicated GDMT described below   -Metoprolol succinate, Isosorbide, Hydralazine, SGLT2i, Max dose Entresto  - Daily weights (standing if tolerated)  - Strict I/Os  - Fluid restriction at 1500mL  - Cardiac diet

## 2023-01-07 NOTE — ASSESSMENT & PLAN NOTE
Patient with Hypoxic Respiratory failure which is Acute on chronic.  she is on home oxygen at 2 LPM. Supplemental oxygen was provided and noted- Oxygen Concentration (%):  [24] 24.   Signs/symptoms of respiratory failure include- tachypnea and increased work of breathing. Contributing diagnoses includes - CHF Labs and images were reviewed.    See Acute on chronic CHF

## 2023-01-07 NOTE — ASSESSMENT & PLAN NOTE
57 year old female of PMHx of NICM HFrEF 10%, LVEDD 7.3, s/p ICD, PH, DM, HTN, perm Afib who was referred to the ED from \[facility]\"" clinic for ADHF. Admitted to Hospital medicine service and diuresed with IVP 80 mg Lasix BID.     -GDMT: metoprolol succinate 100 QD, max dose Entresto, Jardiance, MOREJON 100 mg TID, isosorbide 30 TID. Home diuretic of Lasix 80 mg BID. Per pt, she has not taken her metoprolol succinate for a week due to difficulty with picking up Rx from pharmacy. Resume metoprolol succinate at lower dose of 50 mg QD  -With bump in Cr to 2.3 (BL 1.7-2.0), BNP improved to 394, and net -ve 2.1 L/24 hours, recommend transitioning to PO diuretics  -Strict I/Os  -Daily weight  -Last ECHO 11/17/2022 EF 10%, moderately reduced RVSF, G2DD, PASP 65, CVP 15, LVEDD 7.27  -Last RHC 12/19/2022 RA 13, PA 78/40 (52), PCWP 26, CO 4.7, CI 2.3   -Presented to selection committee 3/9/2022 declined due to renal function, lung function, and difficulty with consistent follow up with the \[facility]\"" team

## 2023-01-07 NOTE — PROGRESS NOTES
[facility] ([address] Medicine  Progress Note    Patient Name: Hafsa Hawley  MRN: [id number]  Patient Class: IP- Inpatient   Admission Date: 1/4/2023  Length of Stay: 3 days  Attending Physician: Lisa Hardin DO  Primary Care Provider: Krysta Tony MD        Subjective:     Principal Problem:Acute on chronic respiratory failure with hypoxia        HPI:  56yo F with PMHx of NICM, HFrEF (LVEF 10%), s/p ICD, pulmonary hypertension, DM, HTN, pAF who presents after she was sent in by [facility] clinic after experiencing a coughing paroxysm followed by nausea and vomiting. She reports that she has been experiencing persistent cough for x1 year with recent gradual worsening with intermittent white/pink sputum production. She also reports gradually worsening of her LAGUERRE that has no progressed to dyspnea at rest. She also reports orthostatic dizziness and near syncope. She reports the sensation of irregular breathing while awake and intermittent PND. She denies any new or acutely worsened symptoms from her chronic HF symtpoms but notes a gradual worsening. She has had 2 admissions for decompensated CHF in the past 2 months, most recent of which was 11/15/22 to 11/22/22. She states that these exacerbations presented as wheezing, CP and respiratory distress, and notes that todays episode was not similar. She states that she was recently placed on Isosorbide/hydralazine with improvement in her CP. She has been evaluated by [facility] for advanced options including transplant or LVAD and is currently being evaluated by transplant nephro as well as pulmonary for PH. PFTs negative for COPD, ILD. Last RHC 12/19/22.       ED Course:  In the ED patient was afebrile, /74, HR 72 and satting 94%  on 2L. Initial labs notable for BNP 900s, trop 0.710, WBC 3.22, HGB 9, CO2 22. EKG showed NSR with frequent PVCs, prolonged Qtc but no acute changes from previous. CXR showed improvement of vascular congestion  compared to previous. She was admitted to IM5 with consult to [facility] for further management        Overview/Hospital Course:  Admitted to hospital medicine from cardiology clinic for dyspnea and fatigue. Presentation in keeping with CHF, possible progression of disease. She was able to tolerate PO intake without vomiting as well as with improvement of her fatigue after eating. AICD interrogated 1/5. [facility] consulted to evaluate patients advanced HF options, awaiting recs. Renal funtion stable with diuresis with patient able to wean supplemental O2 to 2L which she uses PRN at home. Failed 6 minute walk test, will need oxygen all of the time instead of prn. After diuresing 2.9L, patients BNP improved to 300s, and there was a small bump in Cr. Patient transitioned to PO lasix 80 BID. Metoprolol succinate restarted at 50mg. Will assess patients response overnight with plan for discharge if volume status and symptoms continue to improve on home regimen.       Interval History: NAEON. She was ambulating to the restroom with no orthostasis and minimal LGAUERRE, without nasal canula while in washroom briefly. She reports improvement in breathing and no CP. She had a 7 beat run of VT that was asymptomatic and unnoticed by patient.     Review of Systems   Constitutional:  Positive for fatigue. Negative for appetite change and fever.   HENT:  Negative for congestion.    Eyes:  Negative for visual disturbance.   Respiratory:  Positive for shortness of breath. Negative for cough and wheezing.    Cardiovascular:  Negative for chest pain and palpitations.   Gastrointestinal:  Positive for abdominal distention. Negative for abdominal pain, constipation, diarrhea, nausea and vomiting.   Genitourinary:  Positive for frequency. Negative for difficulty urinating and dysuria.   Musculoskeletal:  Negative for back pain and neck pain.   Skin:  Negative for color change.   Neurological:  Positive for weakness. Negative for dizziness,  light-headedness, numbness and headaches.   Psychiatric/Behavioral:  Negative for confusion.    Objective:     Vital Signs (Most Recent):  Temp: 99.2 °F (37.3 °C) (01/07/23 1142)  Pulse: 71 (01/07/23 1142)  Resp: 18 (01/07/23 1142)  BP: 108/63 (01/07/23 1142)  SpO2: (!) 94 % (01/07/23 1142)   Vital Signs (24h Range):  Temp:  [99 °F (37.2 °C)-99.7 °F (37.6 °C)] 99.2 °F (37.3 °C)  Pulse:  [66-78] 71  Resp:  [16-18] 18  SpO2:  [89 %-100 %] 94 %  BP: ()/(51-71) 108/63     Weight: 90.4 kg (199 lb 4.7 oz)  Body mass index is 32.17 kg/m².    Intake/Output Summary (Last 24 hours) at 1/7/2023 1437  Last data filed at 1/7/2023 1017  Gross per 24 hour   Intake 960 ml   Output 2400 ml   Net -1440 ml      Physical Exam  Vitals and nursing note reviewed.   Constitutional:       General: She is not in acute distress.     Appearance: Normal appearance. She is ill-appearing.   HENT:      Head: Normocephalic and atraumatic.      Nose: Nose normal. No rhinorrhea.      Mouth/Throat:      Mouth: Mucous membranes are moist.      Pharynx: No posterior oropharyngeal erythema.   Eyes:      Extraocular Movements: Extraocular movements intact.      Conjunctiva/sclera: Conjunctivae normal.      Pupils: Pupils are equal, round, and reactive to light.   Cardiovascular:      Rate and Rhythm: Tachycardia present. Rhythm irregular.      Pulses: Normal pulses.      Comments: Frequent PVC noted on Telemetry  Pulmonary:      Effort: Pulmonary effort is normal.      Breath sounds: Rales present. No wheezing or rhonchi.   Abdominal:      General: Abdomen is flat.      Palpations: Abdomen is soft.      Tenderness: There is no abdominal tenderness. There is no guarding.   Musculoskeletal:         General: No swelling or tenderness. Normal range of motion.      Cervical back: Normal range of motion and neck supple.      Right lower leg: Edema present.      Left lower leg: Edema present.   Skin:     General: Skin is warm and dry.      Capillary  Refill: Capillary refill takes less than 2 seconds.      Findings: No erythema or rash.   Neurological:      General: No focal deficit present.      Mental Status: She is alert and oriented to person, place, and time.   Psychiatric:         Mood and Affect: Mood normal.         Thought Content: Thought content normal.       Significant Labs: All pertinent labs within the past 24 hours have been reviewed.    Significant Imaging: I have reviewed all pertinent imaging results/findings within the past 24 hours.      Assessment/Plan:      * Acute on chronic respiratory failure with hypoxia  Patient with Hypoxic Respiratory failure which is Acute on chronic.  she is on home oxygen at 2 LPM. Supplemental oxygen was provided and noted- Oxygen Concentration (%):  [24] 24.   Signs/symptoms of respiratory failure include- tachypnea and increased work of breathing. Contributing diagnoses includes - CHF Labs and images were reviewed.    See Acute on chronic CHF    Acute decompensated heart failure        Type 2 diabetes mellitus without complication, without long-term current use of insulin  Patient's FSGs are controlled on current medication regimen.  Last A1c reviewed-   Lab Results   Component Value Date    HGBA1C 4.6 01/04/2023     Most recent fingerstick glucose reviewed-   Recent Labs   Lab 01/05/23  1742 01/05/23 2010 01/06/23  0757 01/06/23  1119   POCTGLUCOSE 179* 148* 84 118*     Hold Oral hypoglycemics while patient is in the hospital.  Blood glucoses have been within goal, will monitor and start long acting if indicated    ICD (implantable cardioverter-defibrillator) in place  Device interrogation ordered 1/5      CKD (chronic kidney disease), stage IV  Cr at baseline of 1.7  No indications for HD at this time    Plan:  - Trend Cr  - Strict I&Os  - Avoid nephrotoxic agents  - Renally adjust medications  - monitor urine output        Acute on chronic combined systolic and diastolic heart failure  NYHA III, ACC stage  D  NICM      Echo 11/15/22  · The left ventricle is severely enlarged with eccentric hypertrophy and severely decreased systolic function. The estimated ejection fraction is 10%.  · The right ventricle is not well visualized but appears normal in size with moderately reduced systolic function.  · Grade II left ventricular diastolic dysfunction.  · Biatrial enlargement.  · The estimated PA systolic pressure is 65 mmHg.  · Elevated central venous pressure (15 mmHg).  · Small posterolateral pericardial effusion.       Home GDMT/Diuretic:  - Beta Blocker: Metoprolol succinate   - ACEi/ARB/ARNi: Max dose Entresto  - MRA: precluded 2/2 CKDIII  - Isosorbide/Hydralazine  - SGLT2: Jardiance   - Diuretic: PO Lasix 80 BID    CXR Improved from previous  BNP 900s  EKG narrow complex with frequent ventricular ectopy    Plan:  - Transition to PO Lasix at home dose of 80mg BID    - Restart Metoprolol Succinate 50mg   - Cardiac Device interrogation ordered  - HTS consulted, patient not LVAD/transplant candidate currently given Pulmonary disease and renal function  - Continue indicated GDMT described below   -Metoprolol succinate, Isosorbide, Hydralazine, SGLT2i, Max dose Entresto  - Daily weights (standing if tolerated)  - Strict I/Os  - Fluid restriction at 1500mL  - Cardiac diet    NICM (nonischemic cardiomyopathy)        Paroxysmal atrial fibrillation  Patient with Paroxysmal (<7 days) atrial fibrillation which is controlled currently with Beta Blocker and Amiodarone. Patient is currently in sinus rhythm. Anticoagulation indicated. Anticoagulation done with Eliquis.    Plan:   Continue Metoprolol  Continue Amiodarone  Continue apixaban  Cardiac monitoring    MELISSA (obstructive sleep apnea)  CPAP QHS      Essential hypertension  Continue hydralazine, ISMN, entresto  Monitor BP  Up-titrate as indicated        VTE Risk Mitigation (From admission, onward)         Ordered     apixaban tablet 5 mg  2 times daily         01/04/23 1683      Place sequential compression device  Until discontinued         01/04/23 1552     Reason for No Pharmacological VTE Prophylaxis  Once        Question:  Reasons:  Answer:  Already adequately anticoagulated on oral Anticoagulants    01/04/23 1552     IP VTE HIGH RISK PATIENT  Once         01/04/23 1530                Discharge Planning   CHIQUI: 1/7/2023     Code Status: Full Code   Is the patient medically ready for discharge?: No    Reason for patient still in hospital (select all that apply): Patient trending condition, Laboratory test, Treatment, Consult recommendations, PT / OT recommendations and Pending disposition  Discharge Plan A: Home with family                  Barak Burnett DO  Department of Hospital Medicine   [facility] - Intensive Care ([place])

## 2023-01-07 NOTE — PROGRESS NOTES
"[facility] ([place] [address])  Heart Transplant  Progress Note    Patient Name: Hafsa Hawley  MRN: [id number]  Admission Date: 1/4/2023  Hospital Length of Stay: 3 days  Attending Physician: Barak Barajas MD  Primary Care Provider: Krysta Tony MD  Principal Problem:Acute on chronic respiratory failure with hypoxia    Subjective:     Interval History: NAEON. Remain on IVP Lasix 80 mg BID. Slight bump in Cr to 2.3 (BL 1.7-2.0). BNP improved to 394 from 900 on admit. Net -ve 2.1 L/24 hours. Recommend transitioning to PO diuretics, previous home diuretic regimen of PO lasix 80 mg BID. Start lower dose metoprolol succinate at 50 mg QD.     Continuous Infusions:  Scheduled Meds:   allopurinoL  100 mg Oral Daily    amiodarone  200 mg Oral Daily    apixaban  5 mg Oral BID    atorvastatin  40 mg Oral QHS    DULoxetine  30 mg Oral Daily    ergocalciferol  50,000 Units Oral Q7 Days    fluticasone furoate-vilanteroL  1 puff Inhalation Daily    furosemide (LASIX) injection  80 mg Intravenous Q12H    hydrALAZINE  100 mg Oral TID    isosorbide dinitrate  30 mg Oral TID    metoprolol succinate  50 mg Oral Daily    montelukast  10 mg Oral Daily    ondansetron  4 mg Oral BID    pantoprazole  40 mg Oral Daily    potassium chloride  10 mEq Oral Daily    sacubitriL-valsartan  1 tablet Oral BID    tiotropium bromide  2 puff Inhalation Daily     PRN Meds:acetaminophen, albuterol-ipratropium, dextrose 10%, dextrose 10%, glucagon (human recombinant), glucose, glucose, melatonin, naloxone, nitroGLYCERIN, polyethylene glycol, sodium chloride 0.9%    Review of patient's allergies indicates:   Allergen Reactions    Penicillins Hives and Other (See Comments)    Iodinated contrast media Nausea And Vomiting    Oxycodone-acetaminophen Other (See Comments)     Nausea, Dizziness, Anxiety.  "I don't like how it makes me feel."   Given Hydromorphone 0.5mg IVP  Without problems.  Other reaction(s): Other " (See Comments)    Clonazepam Other (See Comments)    Diovan hct [valsartan-hydrochlorothiazide] Other (See Comments)     Causes coughing    Iodine Other (See Comments)    Irinotecan      Pt has homozygosity for the TA7 promoter variant that places this individual at significantly increased risk for   severe neutropenia (grade 4) when treated with the standard dose of irinotecan (risk approximately 50%).   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 Metabolism of other drugs not listed here may also be impacted by UGT1A1 enzyme activity.       Tramadol Nausea And Vomiting and Other (See Comments)     Other reaction(s): Other (See Comments)    Valsartan Other (See Comments)     Objective:     Vital Signs (Most Recent):  Temp: 99 °F (37.2 °C) (01/07/23 0855)  Pulse: 70 (01/07/23 0855)  Resp: 18 (01/07/23 0855)  BP: 127/71 (01/07/23 0855)  SpO2: (!) 93 % (01/07/23 0855)   Vital Signs (24h Range):  Temp:  [99 °F (37.2 °C)-99.7 °F (37.6 °C)] 99 °F (37.2 °C)  Pulse:  [62-78] 70  Resp:  [16-18] 18  SpO2:  [87 %-96 %] 93 %  BP: ()/(51-71) 127/71     Patient Vitals for the past 72 hrs (Last 3 readings):   Weight   01/07/23 0615 90.4 kg (199 lb 4.7 oz)   01/06/23 2115 95.3 kg (210 lb)   01/04/23 1039 95.3 kg (210 lb)     Body mass index is 32.17 kg/m².      Intake/Output Summary (Last 24 hours) at 1/7/2023 0912  Last data filed at 1/7/2023 0623  Gross per 24 hour   Intake 1200 ml   Output 3300 ml   Net -2100 ml       Hemodynamic Parameters:           Physical Exam  Vitals reviewed.   HENT:      Head: Normocephalic.   Eyes:      Conjunctiva/sclera: Conjunctivae normal.   Cardiovascular:      Rate and Rhythm: Normal rate and regular rhythm.   Pulmonary:      Effort: Pulmonary effort is normal.   Musculoskeletal:         General: Normal range of motion.      Cervical back: Normal range of motion.       Right lower leg: No edema.      Left lower leg: No edema.   Skin:     General: Skin is warm.      Capillary Refill: Capillary refill takes less than 2 seconds.   Neurological:      General: No focal deficit present.      Mental Status: She is alert.   Psychiatric:         Mood and Affect: Mood normal.         Behavior: Behavior normal.         Thought Content: Thought content normal.         Judgment: Judgment normal.       Significant Labs:  CBC:  Recent Labs   Lab 01/05/23  0500 01/06/23  0533 01/07/23  0313   WBC 3.12* 3.84* 3.70*   RBC 5.23 4.92 4.75   HGB 8.7* 8.2* 8.1*   HCT 31.2* 29.9* 27.9*    185 180   MCV 60* 61* 59*   MCH 16.6* 16.7* 17.1*   MCHC 27.9* 27.4* 29.0*     BNP:  Recent Labs   Lab 01/04/23  0904 01/04/23  1213 01/06/23  2322   * 911* 394*     CMP:  Recent Labs   Lab 01/05/23  0500 01/06/23  0533 01/07/23  0313   GLU 74 80 93   CALCIUM 8.8 9.0 8.4*   ALBUMIN 3.6 3.6 3.3*   PROT 6.4 6.1 5.9*    140 136   K 4.0 4.1 3.9   CO2 24 24 25    105 101   BUN 26* 30* 37*   CREATININE 1.9* 2.1* 2.3*   ALKPHOS 44* 42* 41*   ALT 11 9* 7*   AST 22 13 11   BILITOT 1.5* 1.2* 1.3*      Coagulation:   Recent Labs   Lab 01/04/23  0904   INR 1.0   APTT 29.1     LDH:  No results for input(s): LDH in the last 72 hours.  Microbiology:  Microbiology Results (last 7 days)       Procedure Component Value Units Date/Time    Urine culture [[id number]] Collected: 01/04/23 1607    Order Status: Completed Specimen: Urine Updated: 01/06/23 0002     Urine Culture, Routine Multiple organisms isolated. None in predominance.  Repeat if      clinically necessary.    Narrative:      Specimen Source->Urine            I have reviewed all pertinent labs within the past 24 hours.    Estimated Creatinine Clearance: 30.5 mL/min (A) (based on SCr of 2.3 mg/dL (H)).    Diagnostic Results:  I have reviewed all pertinent imaging results/findings within the past 24 hours.    Assessment and Plan:     57 year old female  of PMHx of NICM HFrEF 10%, LVEDD 7.3, s/p ICD, PH, DM, HTN, perm Afib who was referred to the ED from [facility] clinic for ADHF. She has had symptoms of volume overload for about a month, which include SOB, abdominal distension, and weakness. Presented to selection committee 3/9/2022 declined due to renal function, lung function, and difficulty with consistent follow up with the [facility] team. Risk stratification CPX and RHC ordered. GDMT: metoprolol succinate 100 QD, max dose Entresto, Jardiance, MOREJON 100 mg TID, isosorbide 30 TID. Home diuretic of Lasix 80 mg BID. Per pt, she has not taken her metoprolol succinate for a week due to difficulty with picking up Rx from pharmacy. Has been diuresing with 80 mg IVP Lasix BID during admission.     Last ECHO 11/17/2022 EF 10%, moderately reduced RVSF, G2DD, PASP 65, CVP 15, LVEDD 7.27  Last RHC 12/19/2022 RA 13, PA 78/40 (52), PCWP 26, CO 4.7, CI 2.3       Acute decompensated heart failure  57 year old female of PMHx of NICM HFrEF 10%, LVEDD 7.3, s/p ICD, PH, DM, HTN, perm Afib who was referred to the ED from [facility] clinic for ADHF. Admitted to Hospital medicine service and diuresed with IVP 80 mg Lasix BID.     -GDMT: metoprolol succinate 100 QD, max dose Entresto, Jardiance, MOREJON 100 mg TID, isosorbide 30 TID. Home diuretic of Lasix 80 mg BID. Per pt, she has not taken her metoprolol succinate for a week due to difficulty with picking up Rx from pharmacy. Resume metoprolol succinate at lower dose of 50 mg QD  -With bump in Cr to 2.3 (BL 1.7-2.0), BNP improved to 394, and net -ve 2.1 L/24 hours, recommend transitioning to PO diuretics  -Strict I/Os  -Daily weight  -Last ECHO 11/17/2022 EF 10%, moderately reduced RVSF, G2DD, PASP 65, CVP 15, LVEDD 7.27  -Last RHC 12/19/2022 RA 13, PA 78/40 (52), PCWP 26, CO 4.7, CI 2.3   -Presented to selection committee 3/9/2022 declined due to renal function, lung function, and difficulty with consistent follow up with the HTS team      Yanely BEAVER  SPENCER Garg  Heart Transplant  [facility] ([facility]

## 2023-01-08 VITALS
HEIGHT: 66 IN | RESPIRATION RATE: 18 BRPM | WEIGHT: 199.31 LBS | SYSTOLIC BLOOD PRESSURE: 116 MMHG | HEART RATE: 69 BPM | DIASTOLIC BLOOD PRESSURE: 68 MMHG | TEMPERATURE: 98 F | OXYGEN SATURATION: 92 % | BODY MASS INDEX: 32.03 KG/M2

## 2023-01-08 LAB
ANION GAP SERPL CALC-SCNC: 7 MMOL/L (ref 8–16)
BUN SERPL-MCNC: 42 MG/DL (ref 6–20)
CALCIUM SERPL-MCNC: 8.9 MG/DL (ref 8.7–10.5)
CHLORIDE SERPL-SCNC: 101 MMOL/L (ref 95–110)
CLASS I ANTIBODIES - LUMINEX: NORMAL
CLASS I ANTIBODY COMMENTS - LUMINEX: NORMAL
CLASS II ANTIBODIES - LUMINEX: NEGATIVE
CO2 SERPL-SCNC: 29 MMOL/L (ref 23–29)
CPRA %: 61
CREAT SERPL-MCNC: 2.5 MG/DL (ref 0.5–1.4)
EST. GFR  (NO RACE VARIABLE): 21.9 ML/MIN/1.73 M^2
GLUCOSE SERPL-MCNC: 105 MG/DL (ref 70–110)
POCT GLUCOSE: 105 MG/DL (ref 70–110)
POCT GLUCOSE: 142 MG/DL (ref 70–110)
POTASSIUM SERPL-SCNC: 3.6 MMOL/L (ref 3.5–5.1)
SERUM COLLECTION DT - LUMINEX CLASS I: NORMAL
SERUM COLLECTION DT - LUMINEX CLASS II: NORMAL
SODIUM SERPL-SCNC: 137 MMOL/L (ref 136–145)
SPCL1 TESTING DATE: NORMAL
SPCL2 TESTING DATE: NORMAL
SPLUA TESTING DATE: NORMAL

## 2023-01-08 PROCEDURE — 36415 COLL VENOUS BLD VENIPUNCTURE: CPT | Mod: NTX | Performed by: PHYSICIAN ASSISTANT

## 2023-01-08 PROCEDURE — 25000003 PHARM REV CODE 250: Mod: NTX

## 2023-01-08 PROCEDURE — 99900035 HC TECH TIME PER 15 MIN (STAT): Mod: NTX

## 2023-01-08 PROCEDURE — 99239 PR HOSPITAL DISCHARGE DAY,>30 MIN: ICD-10-PCS | Mod: NTX,,, | Performed by: STUDENT IN AN ORGANIZED HEALTH CARE EDUCATION/TRAINING PROGRAM

## 2023-01-08 PROCEDURE — 27000221 HC OXYGEN, UP TO 24 HOURS: Mod: NTX

## 2023-01-08 PROCEDURE — 94761 N-INVAS EAR/PLS OXIMETRY MLT: CPT | Mod: NTX

## 2023-01-08 PROCEDURE — 99239 HOSP IP/OBS DSCHRG MGMT >30: CPT | Mod: NTX,,, | Performed by: STUDENT IN AN ORGANIZED HEALTH CARE EDUCATION/TRAINING PROGRAM

## 2023-01-08 PROCEDURE — 94640 AIRWAY INHALATION TREATMENT: CPT | Mod: NTX

## 2023-01-08 PROCEDURE — 80048 BASIC METABOLIC PNL TOTAL CA: CPT | Mod: NTX | Performed by: PHYSICIAN ASSISTANT

## 2023-01-08 RX ORDER — METOPROLOL SUCCINATE 50 MG/1
50 TABLET, EXTENDED RELEASE ORAL DAILY
Qty: 30 TABLET | Refills: 11 | Status: ON HOLD | OUTPATIENT
Start: 2023-01-08 | End: 2023-05-01 | Stop reason: HOSPADM

## 2023-01-08 RX ORDER — FUROSEMIDE 20 MG/1
80 TABLET ORAL 2 TIMES DAILY
Status: DISCONTINUED | OUTPATIENT
Start: 2023-01-09 | End: 2023-01-08 | Stop reason: HOSPADM

## 2023-01-08 RX ORDER — FUROSEMIDE 80 MG/1
80 TABLET ORAL 2 TIMES DAILY
Qty: 60 TABLET | Refills: 11 | Status: SHIPPED | OUTPATIENT
Start: 2023-01-08 | End: 2023-02-16 | Stop reason: ALTCHOICE

## 2023-01-08 RX ORDER — FUROSEMIDE 80 MG/1
80 TABLET ORAL 2 TIMES DAILY
Qty: 60 TABLET | Refills: 11 | Status: SHIPPED | OUTPATIENT
Start: 2023-01-08 | End: 2023-01-08 | Stop reason: HOSPADM

## 2023-01-08 RX ORDER — MONTELUKAST SODIUM 10 MG/1
10 TABLET ORAL DAILY
Qty: 30 TABLET | Refills: 0 | Status: SHIPPED | OUTPATIENT
Start: 2023-01-08 | End: 2023-02-07

## 2023-01-08 RX ORDER — GUAIFENESIN/DEXTROMETHORPHAN 100-10MG/5
5 SYRUP ORAL EVERY 4 HOURS PRN
Status: DISCONTINUED | OUTPATIENT
Start: 2023-01-08 | End: 2023-01-08 | Stop reason: HOSPADM

## 2023-01-08 RX ORDER — METOPROLOL SUCCINATE 50 MG/1
50 TABLET, EXTENDED RELEASE ORAL ONCE
Status: COMPLETED | OUTPATIENT
Start: 2023-01-08 | End: 2023-01-08

## 2023-01-08 RX ADMIN — FUROSEMIDE 80 MG: 20 TABLET ORAL at 09:01

## 2023-01-08 RX ADMIN — ALLOPURINOL 100 MG: 100 TABLET ORAL at 09:01

## 2023-01-08 RX ADMIN — MONTELUKAST 10 MG: 10 TABLET, FILM COATED ORAL at 09:01

## 2023-01-08 RX ADMIN — PANTOPRAZOLE SODIUM 40 MG: 40 TABLET, DELAYED RELEASE ORAL at 09:01

## 2023-01-08 RX ADMIN — DULOXETIN HYDROCHLORIDE 30 MG: 30 CAPSULE, DELAYED RELEASE ORAL at 09:01

## 2023-01-08 RX ADMIN — ISOSORBIDE DINITRATE 30 MG: 10 TABLET ORAL at 09:01

## 2023-01-08 RX ADMIN — ONDANSETRON 4 MG: 4 TABLET, ORALLY DISINTEGRATING ORAL at 09:01

## 2023-01-08 RX ADMIN — TIOTROPIUM BROMIDE INHALATION SPRAY 2 PUFF: 3.12 SPRAY, METERED RESPIRATORY (INHALATION) at 08:01

## 2023-01-08 RX ADMIN — POTASSIUM CHLORIDE 10 MEQ: 10 CAPSULE, COATED, EXTENDED RELEASE ORAL at 09:01

## 2023-01-08 RX ADMIN — METOPROLOL SUCCINATE 50 MG: 50 TABLET, EXTENDED RELEASE ORAL at 01:01

## 2023-01-08 RX ADMIN — GUAIFENESIN AND DEXTROMETHORPHAN 5 ML: 100; 10 SYRUP ORAL at 05:01

## 2023-01-08 RX ADMIN — APIXABAN 5 MG: 5 TABLET, FILM COATED ORAL at 09:01

## 2023-01-08 RX ADMIN — AMIODARONE HYDROCHLORIDE 200 MG: 200 TABLET ORAL at 09:01

## 2023-01-08 RX ADMIN — SACUBITRIL AND VALSARTAN 1 TABLET: 97; 103 TABLET, FILM COATED ORAL at 01:01

## 2023-01-08 RX ADMIN — FLUTICASONE FUROATE AND VILANTEROL TRIFENATATE 1 PUFF: 200; 25 POWDER RESPIRATORY (INHALATION) at 08:01

## 2023-01-08 NOTE — NURSING
PT discharged today. Awake, alert, and oriented with no acute distress noted. Reviewed discharge orders including ;medicine orders, prescriptions, followup appts, and patient education materials for diet and diagnosis.  Belongings packed for transport to home. Ambulating out per wheelchair at time of discharge.

## 2023-01-08 NOTE — NURSING
Pt states that she has been receiving threatening phone calls from 's mistress, security called to note situation, charge nurse made aware. Pt has been made confidential in chart.

## 2023-01-08 NOTE — PROGRESS NOTES
Transitioned to PO Lasix 80 mg BID yesterday. Added metoprolol succinate 50 mg QD to GDMT. Cr mildly elevated at 2.5 (BL 1.9-2.3). Continue PO diuretics and obtain weekly labs (BMP, Mag, BNP).  Follow up in HTS clinic in 1-3 weeks to continue evaluation.       Will sign off at this time. Please contact with any questions.     Yanely Garg PA-C  Heart Transplant Service  [facility]

## 2023-01-09 ENCOUNTER — PATIENT MESSAGE (OUTPATIENT)
Dept: ADMINISTRATIVE | Facility: HOSPITAL | Age: 58
End: 2023-01-09
Payer: MEDICAID

## 2023-01-09 ENCOUNTER — PATIENT OUTREACH (OUTPATIENT)
Dept: ADMINISTRATIVE | Facility: CLINIC | Age: 58
End: 2023-01-09
Payer: MEDICAID

## 2023-01-09 NOTE — DISCHARGE SUMMARY
[facility] - Intensive Care ([place] [address])  [facility] Medicine  Discharge Summary      Patient Name: Hafsa Hawley  MRN: [id number]  GLENN: [id number]  Patient Class: IP- Inpatient  Admission Date: 1/4/2023  Hospital Length of Stay: 4 days  Discharge Date and Time:  01/09/2023 7:31 AM  Attending Physician: [place] att. providers found   Discharging Provider: Barak Burnett DO  Primary Care Provider: Krysta oTny MD  Hospital Medicine Team: [facility] MED 5 Barak Burnett DO  Primary Care Team: [facility] MED 5    HPI:   56yo F with PMHx of NICM, HFrEF (LVEF 10%), s/p ICD, pulmonary hypertension, DM, HTN, pAF who presents after she was sent in by [facility] clinic after experiencing a coughing paroxysm followed by nausea and vomiting. She reports that she has been experiencing persistent cough for x1 year with recent gradual worsening with intermittent white/pink sputum production. She also reports gradually worsening of her LAGUERRE that has no progressed to dyspnea at rest. She also reports orthostatic dizziness and near syncope. She reports the sensation of irregular breathing while awake and intermittent PND. She denies any new or acutely worsened symptoms from her chronic HF symtpoms but notes a gradual worsening. She has had 2 admissions for decompensated CHF in the past 2 months, most recent of which was 11/15/22 to 11/22/22. She states that these exacerbations presented as wheezing, CP and respiratory distress, and notes that todays episode was not similar. She states that she was recently placed on Isosorbide/hydralazine with improvement in her CP. She has been evaluated by [facility] for advanced options including transplant or LVAD and is currently being evaluated by transplant nephro as well as pulmonary for PH. PFTs negative for COPD, ILD. Last RHC 12/19/22.       ED Course:  In the ED patient was afebrile, /74, HR 72 and satting 94%  on 2L. Initial labs notable for BNP 900s, trop 0.710, WBC 3.22, HGB 9, CO2 22. EKG  showed NSR with frequent PVCs, prolonged Qtc but no acute changes from previous. CXR showed improvement of vascular congestion compared to previous. She was admitted to IM5 with consult to [facility] for further management        * No surgery found *      Hospital Course:   Admitted to hospital medicine from cardiology clinic for dyspnea and fatigue. Presentation in keeping with CHF, possible progression of disease. She was able to tolerate PO intake without vomiting as well as with improvement of her fatigue after eating. AICD interrogated 1/5. [facility] consulted to evaluate patients advanced HF options, awaiting recs. Renal funtion stable with diuresis with patient able to wean supplemental O2 to 2L which she uses PRN at home. Failed 6 minute walk test, will need oxygen all of the time instead of prn. After diuresing 2.9L, patients BNP improved to 300s, and there was a small bump in Cr. Patient transitioned to PO lasix 80 BID. Metoprolol succinate restarted at 50mg. Patient continued to show adequate response to her PO dose of lasix. Stable and medically ready for discharge on 1/8/23 with [facility], nephrology and palliative care referrals as well as outpatient renal function panel to be completed in 1 week.        Goals of Care Treatment Preferences:  Code Status: Full Code      Consults:     No new Assessment & Plan notes have been filed under this hospital service since the last note was generated.  Service: Hospital Medicine    Final Active Diagnoses:    Diagnosis Date Noted POA    PRINCIPAL PROBLEM:  Acute on chronic respiratory failure with hypoxia [J96.21] 08/05/2021 Yes    Pulmonary HTN [I27.20] 01/06/2023 Yes     Chronic    Demand ischemia [I24.8] 01/04/2023 Yes    Acute decompensated heart failure [I50.9] 11/18/2022 Yes    Type 2 diabetes mellitus without complication, without long-term current use of insulin [E11.9] 08/13/2020 Yes     Chronic    Leukopenia [D72.819] 08/13/2020 Yes    Paroxysmal supraventricular  tachycardia [I47.1] 08/20/2018 Yes     Chronic    ICD (implantable cardioverter-defibrillator) in place [Z95.810] 07/24/2018 Yes     Chronic    CKD (chronic kidney disease), stage IV [N18.4] 05/08/2018 Yes     Chronic    Acute on chronic combined systolic and diastolic heart failure [I50.42] 03/16/2018 Yes     Chronic    NICM (nonischemic cardiomyopathy) [I42.8] 10/27/2016 Yes     Chronic    Paroxysmal atrial fibrillation [I48.0] 08/25/2016 Yes     Chronic    MELISSA (obstructive sleep apnea) [G47.33] 08/23/2016 Yes     Chronic    Essential hypertension [I10] 07/22/2016 Yes     Chronic    Microcytic anemia [D50.9] 07/20/2016 Yes     Chronic      Problems Resolved During this Admission:       Discharged Condition: good    Disposition: Home or Self Care    Follow Up:    Patient Instructions:      RENAL FUNCTION PANEL   Standing Status: Future Standing Exp. Date: 03/08/24     Ambulatory referral/consult to Nephrology   Standing Status: Future   Referral Priority: Routine Referral Type: Consultation   Referral Reason: Specialty Services Required   Requested Specialty: Nephrology   Number of Visits Requested: 1     Ambulatory referral/consult to CLINIC Palliative Care   Standing Status: Future   Referral Priority: Routine Referral Type: Consultation   Requested Specialty: Palliative Medicine   Number of Visits Requested: 1     Ambulatory referral/consult to Transplant, Heart   Standing Status: Future   Referral Priority: Routine Referral Type: Transplants   Number of Visits Requested: 1       Significant Diagnostic Studies: Labs: All labs within the past 24 hours have been reviewed    Pending Diagnostic Studies:     None         Medications:  Reconciled Home Medications:      Medication List      START taking these medications    metoprolol succinate 50 MG 24 hr tablet  Commonly known as: TOPROL-XL  Take 1 tablet (50 mg total) by mouth once daily.        CHANGE how you take these medications    albuterol-ipratropium  2.5 mg-0.5 mg/3 mL nebulizer solution  Commonly known as: DUO-NEB  Take 3 mLs by nebulization 2 (two) times a day.  What changed:   · when to take this  · reasons to take this     allopurinoL 100 MG tablet  Commonly known as: ZYLOPRIM  Take 1 tablet (100 mg total) by mouth once daily.  What changed:   · when to take this  · reasons to take this     ergocalciferol 50,000 unit Cap  Commonly known as: ERGOCALCIFEROL  Take 1 capsule (50,000 Units total) by mouth every 7 days.  What changed: when to take this     ferrous sulfate 325 (65 FE) MG EC tablet  Take 1 tablet (325 mg total) by mouth once daily.  What changed: when to take this     furosemide 80 MG tablet  Commonly known as: LASIX  Take 1 tablet (80 mg total) by mouth 2 (two) times daily.  What changed:   · when to take this  · additional instructions     LIDOcaine 5 %  Commonly known as: LIDODERM  Place 1 patch onto the skin once daily. Remove & Discard patch within 12 hours or as directed by MD  What changed:   · when to take this  · reasons to take this     montelukast 10 mg tablet  Commonly known as: SINGULAIR  Take 1 tablet (10 mg total) by mouth once daily.  What changed: See the new instructions.     ondansetron 4 MG Tbdl  Commonly known as: ZOFRAN-ODT  Take 1 tablet (4 mg total) by mouth 2 (two) times daily.  What changed:   · when to take this  · reasons to take this        CONTINUE taking these medications    acetaminophen 500 MG tablet  Commonly known as: TYLENOL  Take 1,000 mg by mouth 2 (two) times daily as needed for Pain.     ALPRAZolam 2 MG Tab  Commonly known as: XANAX  Take 0.25-2 mg by mouth 2 (two) times daily as needed (anxiety).     amiodarone 200 MG Tab  Commonly known as: PACERONE  Take 1 tablet (200 mg total) by mouth once daily.     apixaban 5 mg Tab  Commonly known as: ELIQUIS  Take 1 tablet (5 mg total) by mouth 2 (two) times daily.     atorvastatin 40 MG tablet  Commonly known as: LIPITOR  Take 1 tablet (40 mg total) by mouth every  evening.     b complex vitamins tablet  Take 1 tablet by mouth once daily.     blood sugar diagnostic Strp  Commonly known as: ACCU-CHEK GUIDE TEST STRIPS  1 strip by Other route 3 (three) times daily.     cetirizine 10 MG tablet  Commonly known as: ZYRTEC  Take 10 mg by mouth daily as needed for Allergies.     diclofenac sodium 1 % Gel  Commonly known as: VOLTAREN  APPLY 2 G TOPICALLY 3 (THREE) TIMES DAILY AS NEEDED (PAIN).     DULERA 200-5 mcg/actuation inhaler  Generic drug: mometasone-formoterol  Inhale 2 puffs into the lungs 2 (two) times daily. Controller     DULoxetine 30 MG capsule  Commonly known as: CYMBALTA  Take 1 capsule orally once a day.     ENTRESTO  mg per tablet  Generic drug: sacubitriL-valsartan  Take 1 tablet by mouth 2 (two) times daily.     fluticasone propionate 50 mcg/actuation nasal spray  Commonly known as: FLONASE  2 sprays by Each Nostril route daily as needed for Rhinitis.     glimepiride 2 MG tablet  Commonly known as: AMARYL  TAKE 1 TABLET BY MOUTH EVERY DAY WITH BREAKFAST     hydrALAZINE 100 MG tablet  Commonly known as: APRESOLINE  Take 1 tablet (100 mg total) by mouth 3 (three) times daily.     isosorbide dinitrate 30 MG Tab  Commonly known as: ISORDIL  Take 1 tablet (30 mg total) by mouth 3 (three) times daily.     JARDIANCE 25 mg tablet  Generic drug: empagliflozin  Take 25 mg by mouth once daily.     lancets Misc  Commonly known as: ACCU-CHEK SOFTCLIX LANCETS  1 Device by Misc.(Non-Drug; Combo Route) route 3 (three) times daily.     NITROSTAT 0.4 MG SL tablet  Generic drug: nitroGLYCERIN  Take 2.5 tablets (1 mg total) by mouth every 5 (five) minutes as needed for Chest pain. No more than 3 tablets in 15 minutes.     ONE-A-DAY WOMEN'S ACTIVE ORAL  Take 1 tablet by mouth once daily.     pantoprazole 40 MG tablet  Commonly known as: PROTONIX  TAKE 1 TABLET BY MOUTH DAILY IN THE MORNING     potassium chloride 10 MEQ Cpsr  Commonly known as: MICRO-K  Take 1 capsule (10 mEq  total) by mouth once daily.     scopolamine 1.3-1.5 mg (1 mg over 3 days)  Commonly known as: TRANSDERM-SCOP  Place 1 patch onto the skin every 72 hours as needed (nausea).     SPIRIVA WITH HANDIHALER 18 mcg inhalation capsule  Generic drug: tiotropium  INHALE 1 CAPSULE INTO THE LUNGS ONCE DAILY.     VENTOLIN HFA 90 mcg/actuation inhaler  Generic drug: albuterol  Inhale 2 puffs into the lungs every 6 (six) hours as needed for Shortness of Breath or Wheezing.        STOP taking these medications    promethazine-codeine 6.25-10 mg/5 ml 6.25-10 mg/5 mL syrup  Commonly known as: PHENERGAN with CODEINE            Indwelling Lines/Drains at time of discharge:   Lines/Drains/Airways     None                 Time spent on the discharge of patient: 35 minutes         Barak Burnett DO  Department of Hospital Medicine  [facility] - Intensive Care ([place])

## 2023-01-09 NOTE — PROGRESS NOTES
C3 nurse attempted to contact Hafsa Hawley for a TCC post hospital discharge follow up call. The patient is unable to conduct the call @ this time. The patient requested a callback.    The patient does not have a scheduled HOSFU appointment within 5-7 days post hospital discharge date 1/8/23. Message sent to Physician staff to assist with HOSFU appointment scheduling.

## 2023-01-09 NOTE — CARE UPDATE
Care Update Note:      #NICM/HFrEF (EF 10%) s/p ICD/pHTN/Acute on chronic Hypoxemic respiratory failure- transition IV diuresis to oral, HTS consulted, patient not a candidate for advanced options as detailed in the consultant's notes, broach goals of care as her ultimate prognosis is grim and readmission is likely   #pAF- continue Toprol, amio and Eliquis    Patient suitable for discharge home on 1/8 with HH. Will have f/u [facility] on 1/11 to assess renal function. Outpatient f/u with HTS and nephrology. Discussed goals of care and recommended outpatient palliative care given illness severity.

## 2023-01-10 NOTE — PROGRESS NOTES
2nd attempt made to reach patient for TCC Call. Left voicemail please call [phone] leave first name, last name and . I will return your call.

## 2023-01-10 NOTE — PROGRESS NOTES
3rd attempt made to reach patient for TCC Call. Left voicemail please call [phone] leave first name, last name and . I will return your call.

## 2023-01-13 RX ORDER — ONDANSETRON 4 MG/1
4 TABLET, ORALLY DISINTEGRATING ORAL 2 TIMES DAILY
Qty: 20 TABLET | Refills: 0 | Status: ON HOLD | OUTPATIENT
Start: 2023-01-13 | End: 2023-02-03 | Stop reason: SDUPTHER

## 2023-01-13 RX ORDER — SCOLOPAMINE TRANSDERMAL SYSTEM 1 MG/1
1 PATCH, EXTENDED RELEASE TRANSDERMAL
Qty: 24 PATCH | Refills: 2 | Status: ON HOLD | OUTPATIENT
Start: 2023-01-13 | End: 2023-04-27

## 2023-01-13 RX ORDER — IPRATROPIUM BROMIDE AND ALBUTEROL SULFATE 2.5; .5 MG/3ML; MG/3ML
3 SOLUTION RESPIRATORY (INHALATION) 2 TIMES DAILY
Qty: 90 ML | Refills: 3 | Status: ON HOLD | OUTPATIENT
Start: 2023-01-13 | End: 2023-06-30 | Stop reason: SDUPTHER

## 2023-01-13 NOTE — TELEPHONE ENCOUNTER
----- Message from Lourdes Oquendo sent at 2023 11:23 AM CST -----  Contact: self  Hafsa Hawley  MRN: [id number]  : 1965  PCP: Krysta Tony  Home Phone      [phone]  Work Phone      Not on file.  Mobile          [phone]  Home Phone      [phone]      MESSAGE:   Pt requesting refill or new Rx.   Is this a refill or new RX:  refill  RX name and strength:   albuterol-ipratropium (DUO-NEB) 2.5 mg-0.5 mg/3 mL nebulizer solution  ondansetron (ZOFRAN-ODT) 4 MG TbDL  scopolamine (TRANSDERM-SCOP) 1.3-1.5 mg (1 mg over 3 days)  Last office visit:   2022  Is this a 30-day or 90-day RX:   90 mL / 20   Pharmacy name and location:  [facility]  Comments:  Pt also asked if she can get something sent in for her gout pain    Phone:  [phone]

## 2023-01-13 NOTE — TELEPHONE ENCOUNTER
No new care gaps identified.  [facility] Embedded Care Gaps. Reference number: [id number]. 1/13/2023   3:04:19 PM CST

## 2023-01-20 ENCOUNTER — TELEPHONE (OUTPATIENT)
Dept: FAMILY MEDICINE | Facility: CLINIC | Age: 58
End: 2023-01-20

## 2023-01-20 ENCOUNTER — OFFICE VISIT (OUTPATIENT)
Dept: FAMILY MEDICINE | Facility: CLINIC | Age: 58
End: 2023-01-20
Payer: MEDICAID

## 2023-01-20 VITALS
DIASTOLIC BLOOD PRESSURE: 82 MMHG | SYSTOLIC BLOOD PRESSURE: 124 MMHG | HEART RATE: 84 BPM | WEIGHT: 200.31 LBS | RESPIRATION RATE: 16 BRPM | HEIGHT: 66 IN | BODY MASS INDEX: 32.19 KG/M2

## 2023-01-20 DIAGNOSIS — I27.20 PULMONARY HTN: Chronic | ICD-10-CM

## 2023-01-20 DIAGNOSIS — I48.0 PAROXYSMAL ATRIAL FIBRILLATION: Chronic | ICD-10-CM

## 2023-01-20 DIAGNOSIS — I50.42 CHRONIC COMBINED SYSTOLIC AND DIASTOLIC HEART FAILURE: ICD-10-CM

## 2023-01-20 DIAGNOSIS — I50.42 HYPERTENSIVE HEART DISEASE WITH CHRONIC COMBINED SYSTOLIC AND DIASTOLIC CONGESTIVE HEART FAILURE: ICD-10-CM

## 2023-01-20 DIAGNOSIS — M10.9 GOUT, ARTHRITIS: Primary | ICD-10-CM

## 2023-01-20 DIAGNOSIS — G25.81 RLS (RESTLESS LEGS SYNDROME): ICD-10-CM

## 2023-01-20 DIAGNOSIS — I10 ESSENTIAL HYPERTENSION: Chronic | ICD-10-CM

## 2023-01-20 DIAGNOSIS — Z09 HOSPITAL DISCHARGE FOLLOW-UP: Primary | ICD-10-CM

## 2023-01-20 DIAGNOSIS — I42.8 NICM (NONISCHEMIC CARDIOMYOPATHY): Chronic | ICD-10-CM

## 2023-01-20 DIAGNOSIS — M51.36 LUMBAR DEGENERATIVE DISC DISEASE: ICD-10-CM

## 2023-01-20 DIAGNOSIS — E11.9 TYPE 2 DIABETES MELLITUS WITHOUT COMPLICATION, WITHOUT LONG-TERM CURRENT USE OF INSULIN: Chronic | ICD-10-CM

## 2023-01-20 DIAGNOSIS — I11.0 HYPERTENSIVE HEART DISEASE WITH CHRONIC COMBINED SYSTOLIC AND DIASTOLIC CONGESTIVE HEART FAILURE: ICD-10-CM

## 2023-01-20 PROCEDURE — 3079F DIAST BP 80-89 MM HG: CPT | Mod: CPTII,,, | Performed by: STUDENT IN AN ORGANIZED HEALTH CARE EDUCATION/TRAINING PROGRAM

## 2023-01-20 PROCEDURE — 1159F MED LIST DOCD IN RCRD: CPT | Mod: CPTII,,, | Performed by: STUDENT IN AN ORGANIZED HEALTH CARE EDUCATION/TRAINING PROGRAM

## 2023-01-20 PROCEDURE — 3044F HG A1C LEVEL LT 7.0%: CPT | Mod: CPTII,,, | Performed by: STUDENT IN AN ORGANIZED HEALTH CARE EDUCATION/TRAINING PROGRAM

## 2023-01-20 PROCEDURE — 99495 TRANSJ CARE MGMT MOD F2F 14D: CPT | Mod: S$PBB,,, | Performed by: STUDENT IN AN ORGANIZED HEALTH CARE EDUCATION/TRAINING PROGRAM

## 2023-01-20 PROCEDURE — 1159F PR MEDICATION LIST DOCUMENTED IN MEDICAL RECORD: ICD-10-PCS | Mod: CPTII,,, | Performed by: STUDENT IN AN ORGANIZED HEALTH CARE EDUCATION/TRAINING PROGRAM

## 2023-01-20 PROCEDURE — 3008F PR BODY MASS INDEX (BMI) DOCUMENTED: ICD-10-PCS | Mod: CPTII,,, | Performed by: STUDENT IN AN ORGANIZED HEALTH CARE EDUCATION/TRAINING PROGRAM

## 2023-01-20 PROCEDURE — 99215 OFFICE O/P EST HI 40 MIN: CPT | Mod: PBBFAC | Performed by: STUDENT IN AN ORGANIZED HEALTH CARE EDUCATION/TRAINING PROGRAM

## 2023-01-20 PROCEDURE — 4010F ACE/ARB THERAPY RXD/TAKEN: CPT | Mod: CPTII,,, | Performed by: STUDENT IN AN ORGANIZED HEALTH CARE EDUCATION/TRAINING PROGRAM

## 2023-01-20 PROCEDURE — 99999 PR PBB SHADOW E&M-EST. PATIENT-LVL V: ICD-10-PCS | Mod: PBBFAC,,, | Performed by: STUDENT IN AN ORGANIZED HEALTH CARE EDUCATION/TRAINING PROGRAM

## 2023-01-20 PROCEDURE — 3074F SYST BP LT 130 MM HG: CPT | Mod: CPTII,,, | Performed by: STUDENT IN AN ORGANIZED HEALTH CARE EDUCATION/TRAINING PROGRAM

## 2023-01-20 PROCEDURE — 3074F PR MOST RECENT SYSTOLIC BLOOD PRESSURE < 130 MM HG: ICD-10-PCS | Mod: CPTII,,, | Performed by: STUDENT IN AN ORGANIZED HEALTH CARE EDUCATION/TRAINING PROGRAM

## 2023-01-20 PROCEDURE — 99495 TCM SERVICES (MODERATE COMPLEXITY): ICD-10-PCS | Mod: S$PBB,,, | Performed by: STUDENT IN AN ORGANIZED HEALTH CARE EDUCATION/TRAINING PROGRAM

## 2023-01-20 PROCEDURE — 4010F PR ACE/ARB THEARPY RXD/TAKEN: ICD-10-PCS | Mod: CPTII,,, | Performed by: STUDENT IN AN ORGANIZED HEALTH CARE EDUCATION/TRAINING PROGRAM

## 2023-01-20 PROCEDURE — 3008F BODY MASS INDEX DOCD: CPT | Mod: CPTII,,, | Performed by: STUDENT IN AN ORGANIZED HEALTH CARE EDUCATION/TRAINING PROGRAM

## 2023-01-20 PROCEDURE — 3079F PR MOST RECENT DIASTOLIC BLOOD PRESSURE 80-89 MM HG: ICD-10-PCS | Mod: CPTII,,, | Performed by: STUDENT IN AN ORGANIZED HEALTH CARE EDUCATION/TRAINING PROGRAM

## 2023-01-20 PROCEDURE — 99999 PR PBB SHADOW E&M-EST. PATIENT-LVL V: CPT | Mod: PBBFAC,,, | Performed by: STUDENT IN AN ORGANIZED HEALTH CARE EDUCATION/TRAINING PROGRAM

## 2023-01-20 PROCEDURE — 3044F PR MOST RECENT HEMOGLOBIN A1C LEVEL <7.0%: ICD-10-PCS | Mod: CPTII,,, | Performed by: STUDENT IN AN ORGANIZED HEALTH CARE EDUCATION/TRAINING PROGRAM

## 2023-01-20 RX ORDER — SACUBITRIL AND VALSARTAN 97; 103 MG/1; MG/1
1 TABLET, FILM COATED ORAL 2 TIMES DAILY
Qty: 60 TABLET | Refills: 5 | Status: ON HOLD | OUTPATIENT
Start: 2023-01-20 | End: 2023-02-22 | Stop reason: HOSPADM

## 2023-01-20 RX ORDER — LIDOCAINE 50 MG/G
1 PATCH TOPICAL DAILY
Qty: 30 PATCH | Refills: 2 | Status: ON HOLD | OUTPATIENT
Start: 2023-01-20 | End: 2023-02-03 | Stop reason: SDUPTHER

## 2023-01-20 RX ORDER — HYDROCODONE BITARTRATE AND ACETAMINOPHEN 5; 325 MG/1; MG/1
1 TABLET ORAL EVERY 6 HOURS PRN
Qty: 28 TABLET | Refills: 0 | Status: SHIPPED | OUTPATIENT
Start: 2023-01-20 | End: 2023-01-27

## 2023-01-20 RX ORDER — ROPINIROLE 4 MG/1
4 TABLET, FILM COATED ORAL DAILY
Qty: 30 TABLET | Refills: 5 | Status: SHIPPED | OUTPATIENT
Start: 2023-01-20 | End: 2023-03-03

## 2023-01-20 NOTE — PROGRESS NOTES
Subjective:       Patient ID: Hafsa Hawley is a 57 y.o. female.    Chief Complaint: Follow-up (Pt here for 1 month follow up states she has since been to the hospital. Doing better still has a slight cough. )    Pt here for hospital follow-up. She was recently     Transitional Care Note    Family and/or Caretaker present at visit?  No.  Diagnostic tests reviewed/disposition: No diagnosic tests pending after this hospitalization.  Disease/illness education: discussed heart failure and its symptoms  Home health/community services discussion/referrals: Patient does not have home health established from hospital visit.  They do need home health.  If needed, we will set up home health for the patient.   Establishment or re-establishment of referral orders for community resources: No other necessary community resources.   Discussion with other health care providers: No discussion with other health care providers necessary.      Per DC summary:  Admitted to hospital medicine from cardiology clinic for dyspnea and fatigue. Presentation in keeping with CHF, possible progression of disease. She was able to tolerate PO intake without vomiting as well as with improvement of her fatigue after eating. AICD interrogated 1/5. HTS consulted to evaluate patients advanced HF options, awaiting recs. Renal funtion stable with diuresis with patient able to wean supplemental O2 to 2L which she uses PRN at home. Failed 6 minute walk test, will need oxygen all of the time instead of prn. After diuresing 2.9L, patients BNP improved to 300s, and there was a small bump in Cr. Patient transitioned to PO lasix 80 BID. Metoprolol succinate restarted at 50mg. Patient continued to show adequate response to her PO dose of lasix. Stable and medically ready for discharge on 1/8/23 with HTS, nephrology and palliative care referrals as well as outpatient renal function panel to be completed in 1 week.      Review of Systems   Constitutional:   Positive for activity change. Negative for appetite change, chills, fatigue and fever.   HENT:  Negative for congestion and sore throat.    Respiratory:  Positive for cough and shortness of breath.    Cardiovascular:  Positive for chest pain and leg swelling.   Gastrointestinal:  Positive for nausea. Negative for abdominal pain, diarrhea and vomiting.   Genitourinary:  Negative for dysuria, frequency, hematuria and urgency.   Musculoskeletal:  Positive for arthralgias and myalgias.   Neurological:  Negative for dizziness, syncope and light-headedness.   Psychiatric/Behavioral:  Positive for dysphoric mood. The patient is nervous/anxious.      Objective:      Physical Exam   HENT:   Head: Normocephalic and atraumatic.   Cardiovascular: Normal heart sounds.   No murmur heard.  Pulmonary/Chest: Effort normal and breath sounds normal. No respiratory distress.   Neurological: She is alert.   Psychiatric:   In better spirits today; excited about the potential for tranplant   Vitals reviewed.    Assessment:       1. Hospital discharge follow-up    2. Lumbar degenerative disc disease    3. RLS (restless legs syndrome)    4. Pulmonary HTN    5. Essential hypertension    6. Paroxysmal atrial fibrillation    7. NICM (nonischemic cardiomyopathy)    8. Hypertensive heart disease with chronic combined systolic and diastolic congestive heart failure    9. Type 2 diabetes mellitus without complication, without long-term current use of insulin    10. Chronic combined systolic and diastolic heart failure          Plan:           1. Hospital discharge follow-up  -     Ambulatory referral/consult to Home Health; Future; Expected date: 01/21/2023    2. Lumbar degenerative disc disease  -     LIDOcaine (LIDODERM) 5 %; Place 1 patch onto the skin once daily. Remove & Discard patch within 12 hours or as directed by MD  Dispense: 30 patch; Refill: 2    3. RLS (restless legs syndrome)  -     rOPINIRole (REQUIP) 4 MG tablet; Take 1 tablet (4  mg total) by mouth once daily. Pt taking 4mg daily  Dispense: 30 tablet; Refill: 5    4. Pulmonary HTN    5. Essential hypertension    6. Paroxysmal atrial fibrillation    7. NICM (nonischemic cardiomyopathy)    8. Hypertensive heart disease with chronic combined systolic and diastolic congestive heart failure    9. Type 2 diabetes mellitus without complication, without long-term current use of insulin  Overview:  Lab Results   Component Value Date    HGBA1C 5.7 (H) 02/15/2022         Orders:  -     Ambulatory referral/consult to Podiatry; Future; Expected date: 01/27/2023    10. Chronic combined systolic and diastolic heart failure  -     sacubitriL-valsartan (ENTRESTO)  mg per tablet; Take 1 tablet by mouth 2 (two) times daily.  Dispense: 60 tablet; Refill: 5    Home health  Cont current meds, refills sent  Follow-up transplant as scheduled  Follow-up cardiology as scheduled   RTC 3 months or sooner if needed

## 2023-01-20 NOTE — TELEPHONE ENCOUNTER
----- Message from Myla Calvert sent at 2023  2:40 PM CST -----  Contact: Self  Hafsa Hawley  MRN: [id number]  : 1965  PCP: Krysta Tony  Home Phone      [phone]  Work Phone      Not on file.  Mobile          [phone]  Home Phone      [phone]      MESSAGE:     Pt requesting pain medication for her foot.--please advise    [phone]

## 2023-01-20 NOTE — TELEPHONE ENCOUNTER
Podiatry referral faxed to [facility] [phone], confirmation received; home health referral faxed to [facility] [phone], confirmation received.

## 2023-01-25 ENCOUNTER — TELEPHONE (OUTPATIENT)
Dept: TRANSPLANT | Facility: CLINIC | Age: 58
End: 2023-01-25
Payer: MEDICAID

## 2023-01-25 NOTE — TELEPHONE ENCOUNTER
I spoke with Hafsa Hawley today to see how she is feeling.  She reports that she is tired, still feels nauseous at times.  Saw Dr Ann as an outpatient, who ordered HH.  Ms Hawley has not seen HH for admission yet, I called [facility] and verified that she is not on their list to be seen today as her insurance is out of network.  I let Dr Ann's nurse know.  Our  team may be able to help identify HH provider for her next week.    I verified that Mrs Hawley is planning to return to [facility] clinic on Monday.  She is not bringing any caregivers along with her.  I reminded her that it is a full day of visits and testing and it may be good to bring someone along with her.  I strongly encouraged her to bring along a caregiver as she will be getting a lot of information from the surgeon and other providers.  Verbalized understanding of instructions.

## 2023-01-26 ENCOUNTER — CLINICAL SUPPORT (OUTPATIENT)
Dept: CARDIOLOGY | Facility: HOSPITAL | Age: 58
End: 2023-01-26
Payer: MEDICAID

## 2023-01-26 DIAGNOSIS — Z95.810 PRESENCE OF AUTOMATIC (IMPLANTABLE) CARDIAC DEFIBRILLATOR: ICD-10-CM

## 2023-01-26 PROCEDURE — 93295 CARDIAC DEVICE CHECK - REMOTE: ICD-10-PCS | Mod: ,,, | Performed by: INTERNAL MEDICINE

## 2023-01-26 PROCEDURE — 93296 REM INTERROG EVL PM/IDS: CPT | Performed by: INTERNAL MEDICINE

## 2023-01-26 PROCEDURE — 93295 DEV INTERROG REMOTE 1/2/MLT: CPT | Mod: ,,, | Performed by: INTERNAL MEDICINE

## 2023-01-27 ENCOUNTER — PATIENT OUTREACH (OUTPATIENT)
Dept: EMERGENCY MEDICINE | Facility: HOSPITAL | Age: 58
End: 2023-01-27
Payer: MEDICAID

## 2023-01-27 ENCOUNTER — TELEPHONE (OUTPATIENT)
Dept: CARDIOTHORACIC SURGERY | Facility: CLINIC | Age: 58
End: 2023-01-27
Payer: MEDICAID

## 2023-01-27 NOTE — TELEPHONE ENCOUNTER
Called to confirm pt's appt with Dr. Chacko for 1/30; no answer. Left VM requesting call back to confirm appt.

## 2023-01-27 NOTE — TELEPHONE ENCOUNTER
Called and confirmed pt's appt with Dr. Chacko for 1/30 with pt, including appt time and location, which she verbalized understanding to.

## 2023-01-30 ENCOUNTER — HOSPITAL ENCOUNTER (INPATIENT)
Facility: HOSPITAL | Age: 58
LOS: 4 days | Discharge: HOME | DRG: 291 | End: 2023-02-03
Attending: STUDENT IN AN ORGANIZED HEALTH CARE EDUCATION/TRAINING PROGRAM | Admitting: STUDENT IN AN ORGANIZED HEALTH CARE EDUCATION/TRAINING PROGRAM
Payer: MEDICAID

## 2023-01-30 ENCOUNTER — TELEPHONE (OUTPATIENT)
Dept: TRANSPLANT | Facility: CLINIC | Age: 58
End: 2023-01-30
Payer: MEDICAID

## 2023-01-30 DIAGNOSIS — M51.36 LUMBAR DEGENERATIVE DISC DISEASE: ICD-10-CM

## 2023-01-30 DIAGNOSIS — M1A.9XX0 CHRONIC GOUT WITHOUT TOPHUS, UNSPECIFIED CAUSE, UNSPECIFIED SITE: ICD-10-CM

## 2023-01-30 DIAGNOSIS — I50.9 ACUTE ON CHRONIC CONGESTIVE HEART FAILURE, UNSPECIFIED HEART FAILURE TYPE: Primary | ICD-10-CM

## 2023-01-30 DIAGNOSIS — R06.02 SHORTNESS OF BREATH: ICD-10-CM

## 2023-01-30 DIAGNOSIS — R06.02 SOB (SHORTNESS OF BREATH): ICD-10-CM

## 2023-01-30 DIAGNOSIS — R07.9 CHEST PAIN: ICD-10-CM

## 2023-01-30 PROBLEM — J96.20 ACUTE ON CHRONIC RESPIRATORY FAILURE: Status: ACTIVE | Noted: 2021-08-05

## 2023-01-30 LAB
ABO + RH BLD: NORMAL
ALBUMIN SERPL BCP-MCNC: 3.9 G/DL (ref 3.5–5.2)
ALP SERPL-CCNC: 50 U/L (ref 55–135)
ALT SERPL W/O P-5'-P-CCNC: 22 U/L (ref 10–44)
ANION GAP SERPL CALC-SCNC: 15 MMOL/L (ref 8–16)
AST SERPL-CCNC: 25 U/L (ref 10–40)
BACTERIA #/AREA URNS AUTO: NORMAL /HPF
BASOPHILS # BLD AUTO: 0.03 K/UL (ref 0–0.2)
BASOPHILS NFR BLD: 0.5 % (ref 0–1.9)
BILIRUB SERPL-MCNC: 2.3 MG/DL (ref 0.1–1)
BILIRUB UR QL STRIP: NEGATIVE
BLD GP AB SCN CELLS X3 SERPL QL: NORMAL
BNP SERPL-MCNC: 2470 PG/ML (ref 0–99)
BUN SERPL-MCNC: 41 MG/DL (ref 6–20)
CALCIUM SERPL-MCNC: 9.6 MG/DL (ref 8.7–10.5)
CHLORIDE SERPL-SCNC: 110 MMOL/L (ref 95–110)
CLARITY UR REFRACT.AUTO: CLEAR
CO2 SERPL-SCNC: 20 MMOL/L (ref 23–29)
COLOR UR AUTO: YELLOW
CREAT SERPL-MCNC: 2.3 MG/DL (ref 0.5–1.4)
DIFFERENTIAL METHOD: ABNORMAL
EOSINOPHIL # BLD AUTO: 0 K/UL (ref 0–0.5)
EOSINOPHIL NFR BLD: 0.2 % (ref 0–8)
ERYTHROCYTE [DISTWIDTH] IN BLOOD BY AUTOMATED COUNT: 28.9 % (ref 11.5–14.5)
EST. GFR  (NO RACE VARIABLE): 24.2 ML/MIN/1.73 M^2
ESTIMATED AVG GLUCOSE: 85 MG/DL (ref 68–131)
GLUCOSE SERPL-MCNC: 102 MG/DL (ref 70–110)
GLUCOSE UR QL STRIP: ABNORMAL
HBA1C MFR BLD: 4.6 % (ref 4–5.6)
HCT VFR BLD AUTO: 27.5 % (ref 37–48.5)
HGB BLD-MCNC: 8 G/DL (ref 12–16)
HGB UR QL STRIP: ABNORMAL
HYALINE CASTS UR QL AUTO: 0 /LPF
IMM GRANULOCYTES # BLD AUTO: 0.08 K/UL (ref 0–0.04)
IMM GRANULOCYTES NFR BLD AUTO: 1.3 % (ref 0–0.5)
INFLUENZA A, MOLECULAR: NOT DETECTED
INFLUENZA B, MOLECULAR: NOT DETECTED
KETONES UR QL STRIP: NEGATIVE
LEUKOCYTE ESTERASE UR QL STRIP: NEGATIVE
LIPASE SERPL-CCNC: 31 U/L (ref 4–60)
LYMPHOCYTES # BLD AUTO: 0.6 K/UL (ref 1–4.8)
LYMPHOCYTES NFR BLD: 9.1 % (ref 18–48)
MAGNESIUM SERPL-MCNC: 1.9 MG/DL (ref 1.6–2.6)
MCH RBC QN AUTO: 17.5 PG (ref 27–31)
MCHC RBC AUTO-ENTMCNC: 29.1 G/DL (ref 32–36)
MCV RBC AUTO: 60 FL (ref 82–98)
MICROSCOPIC COMMENT: NORMAL
MONOCYTES # BLD AUTO: 0.3 K/UL (ref 0.3–1)
MONOCYTES NFR BLD: 5 % (ref 4–15)
NEUTROPHILS # BLD AUTO: 5.2 K/UL (ref 1.8–7.7)
NEUTROPHILS NFR BLD: 83.9 % (ref 38–73)
NITRITE UR QL STRIP: NEGATIVE
NRBC BLD-RTO: 6 /100 WBC
PH UR STRIP: 6 [PH] (ref 5–8)
PLATELET # BLD AUTO: 181 K/UL (ref 150–450)
PMV BLD AUTO: ABNORMAL FL (ref 9.2–12.9)
POTASSIUM SERPL-SCNC: 3.9 MMOL/L (ref 3.5–5.1)
PROT SERPL-MCNC: 6.8 G/DL (ref 6–8.4)
PROT UR QL STRIP: ABNORMAL
RBC # BLD AUTO: 4.57 M/UL (ref 4–5.4)
RBC #/AREA URNS AUTO: 2 /HPF (ref 0–4)
RSV AG BY MOLECULAR METHOD: NOT DETECTED
SARS-COV-2 RNA RESP QL NAA+PROBE: NOT DETECTED
SODIUM SERPL-SCNC: 145 MMOL/L (ref 136–145)
SP GR UR STRIP: 1.01 (ref 1–1.03)
SQUAMOUS #/AREA URNS AUTO: 6 /HPF
TROPONIN I SERPL DL<=0.01 NG/ML-MCNC: 0.62 NG/ML (ref 0–0.03)
TROPONIN I SERPL DL<=0.01 NG/ML-MCNC: 0.62 NG/ML (ref 0–0.03)
URN SPEC COLLECT METH UR: ABNORMAL
WBC # BLD AUTO: 6.18 K/UL (ref 3.9–12.7)
WBC #/AREA URNS AUTO: 1 /HPF (ref 0–5)
YEAST UR QL AUTO: NORMAL

## 2023-01-30 PROCEDURE — 25000242 PHARM REV CODE 250 ALT 637 W/ HCPCS: Mod: NTX

## 2023-01-30 PROCEDURE — 86900 BLOOD TYPING SEROLOGIC ABO: CPT | Mod: NTX | Performed by: STUDENT IN AN ORGANIZED HEALTH CARE EDUCATION/TRAINING PROGRAM

## 2023-01-30 PROCEDURE — 99900035 HC TECH TIME PER 15 MIN (STAT): Mod: NTX

## 2023-01-30 PROCEDURE — 99285 PR EMERGENCY DEPT VISIT,LEVEL V: ICD-10-PCS | Mod: NTX,CS,, | Performed by: STUDENT IN AN ORGANIZED HEALTH CARE EDUCATION/TRAINING PROGRAM

## 2023-01-30 PROCEDURE — 81001 URINALYSIS AUTO W/SCOPE: CPT | Mod: NTX | Performed by: STUDENT IN AN ORGANIZED HEALTH CARE EDUCATION/TRAINING PROGRAM

## 2023-01-30 PROCEDURE — 83880 ASSAY OF NATRIURETIC PEPTIDE: CPT | Mod: NTX | Performed by: STUDENT IN AN ORGANIZED HEALTH CARE EDUCATION/TRAINING PROGRAM

## 2023-01-30 PROCEDURE — 63600175 PHARM REV CODE 636 W HCPCS: Mod: NTX

## 2023-01-30 PROCEDURE — 93010 ELECTROCARDIOGRAM REPORT: CPT | Mod: NTX,,, | Performed by: INTERNAL MEDICINE

## 2023-01-30 PROCEDURE — 63600175 PHARM REV CODE 636 W HCPCS: Mod: NTX | Performed by: STUDENT IN AN ORGANIZED HEALTH CARE EDUCATION/TRAINING PROGRAM

## 2023-01-30 PROCEDURE — 0241U SARS-COV2 (COVID) WITH FLU/RSV BY PCR: CPT | Mod: NTX | Performed by: STUDENT IN AN ORGANIZED HEALTH CARE EDUCATION/TRAINING PROGRAM

## 2023-01-30 PROCEDURE — 94640 AIRWAY INHALATION TREATMENT: CPT | Mod: NTX

## 2023-01-30 PROCEDURE — 83735 ASSAY OF MAGNESIUM: CPT | Mod: NTX | Performed by: STUDENT IN AN ORGANIZED HEALTH CARE EDUCATION/TRAINING PROGRAM

## 2023-01-30 PROCEDURE — 96374 THER/PROPH/DIAG INJ IV PUSH: CPT | Mod: NTX

## 2023-01-30 PROCEDURE — 80053 COMPREHEN METABOLIC PANEL: CPT | Mod: NTX | Performed by: STUDENT IN AN ORGANIZED HEALTH CARE EDUCATION/TRAINING PROGRAM

## 2023-01-30 PROCEDURE — 96375 TX/PRO/DX INJ NEW DRUG ADDON: CPT | Mod: NTX

## 2023-01-30 PROCEDURE — 99223 1ST HOSP IP/OBS HIGH 75: CPT | Mod: NTX,,, | Performed by: STUDENT IN AN ORGANIZED HEALTH CARE EDUCATION/TRAINING PROGRAM

## 2023-01-30 PROCEDURE — 94761 N-INVAS EAR/PLS OXIMETRY MLT: CPT | Mod: NTX

## 2023-01-30 PROCEDURE — 85025 COMPLETE CBC W/AUTO DIFF WBC: CPT | Mod: NTX | Performed by: STUDENT IN AN ORGANIZED HEALTH CARE EDUCATION/TRAINING PROGRAM

## 2023-01-30 PROCEDURE — 83690 ASSAY OF LIPASE: CPT | Mod: NTX | Performed by: STUDENT IN AN ORGANIZED HEALTH CARE EDUCATION/TRAINING PROGRAM

## 2023-01-30 PROCEDURE — 84484 ASSAY OF TROPONIN QUANT: CPT | Mod: NTX | Performed by: STUDENT IN AN ORGANIZED HEALTH CARE EDUCATION/TRAINING PROGRAM

## 2023-01-30 PROCEDURE — 99285 EMERGENCY DEPT VISIT HI MDM: CPT | Mod: NTX,CS,, | Performed by: STUDENT IN AN ORGANIZED HEALTH CARE EDUCATION/TRAINING PROGRAM

## 2023-01-30 PROCEDURE — 11000001 HC ACUTE MED/SURG PRIVATE ROOM: Mod: NTX

## 2023-01-30 PROCEDURE — 25000003 PHARM REV CODE 250: Mod: NTX

## 2023-01-30 PROCEDURE — 84484 ASSAY OF TROPONIN QUANT: CPT | Mod: 91,NTX | Performed by: STUDENT IN AN ORGANIZED HEALTH CARE EDUCATION/TRAINING PROGRAM

## 2023-01-30 PROCEDURE — 27000221 HC OXYGEN, UP TO 24 HOURS: Mod: NTX

## 2023-01-30 PROCEDURE — 25000003 PHARM REV CODE 250: Mod: NTX | Performed by: STUDENT IN AN ORGANIZED HEALTH CARE EDUCATION/TRAINING PROGRAM

## 2023-01-30 PROCEDURE — 83036 HEMOGLOBIN GLYCOSYLATED A1C: CPT | Mod: NTX | Performed by: STUDENT IN AN ORGANIZED HEALTH CARE EDUCATION/TRAINING PROGRAM

## 2023-01-30 PROCEDURE — 99223 PR INITIAL HOSPITAL CARE,LEVL III: ICD-10-PCS | Mod: NTX,,, | Performed by: STUDENT IN AN ORGANIZED HEALTH CARE EDUCATION/TRAINING PROGRAM

## 2023-01-30 PROCEDURE — 93005 ELECTROCARDIOGRAM TRACING: CPT | Mod: NTX

## 2023-01-30 PROCEDURE — 93010 EKG 12-LEAD: ICD-10-PCS | Mod: NTX,,, | Performed by: INTERNAL MEDICINE

## 2023-01-30 PROCEDURE — 99285 EMERGENCY DEPT VISIT HI MDM: CPT | Mod: 25,NTX

## 2023-01-30 RX ORDER — ONDANSETRON 2 MG/ML
4 INJECTION INTRAMUSCULAR; INTRAVENOUS
Status: COMPLETED | OUTPATIENT
Start: 2023-01-30 | End: 2023-01-30

## 2023-01-30 RX ORDER — IBUPROFEN 200 MG
16 TABLET ORAL
Status: DISCONTINUED | OUTPATIENT
Start: 2023-01-30 | End: 2023-02-02

## 2023-01-30 RX ORDER — GUAIFENESIN/DEXTROMETHORPHAN 100-10MG/5
5 SYRUP ORAL EVERY 4 HOURS PRN
Status: DISCONTINUED | OUTPATIENT
Start: 2023-01-30 | End: 2023-02-03 | Stop reason: HOSPADM

## 2023-01-30 RX ORDER — TALC
6 POWDER (GRAM) TOPICAL NIGHTLY PRN
Status: DISCONTINUED | OUTPATIENT
Start: 2023-01-30 | End: 2023-02-03 | Stop reason: HOSPADM

## 2023-01-30 RX ORDER — IPRATROPIUM BROMIDE AND ALBUTEROL SULFATE 2.5; .5 MG/3ML; MG/3ML
3 SOLUTION RESPIRATORY (INHALATION)
Status: DISCONTINUED | OUTPATIENT
Start: 2023-01-30 | End: 2023-02-03 | Stop reason: HOSPADM

## 2023-01-30 RX ORDER — ATORVASTATIN CALCIUM 40 MG/1
40 TABLET, FILM COATED ORAL NIGHTLY
Status: DISCONTINUED | OUTPATIENT
Start: 2023-01-30 | End: 2023-02-03 | Stop reason: HOSPADM

## 2023-01-30 RX ORDER — HYDRALAZINE HYDROCHLORIDE 25 MG/1
100 TABLET, FILM COATED ORAL 3 TIMES DAILY
Status: DISCONTINUED | OUTPATIENT
Start: 2023-01-30 | End: 2023-01-30

## 2023-01-30 RX ORDER — SODIUM CHLORIDE 0.9 % (FLUSH) 0.9 %
10 SYRINGE (ML) INJECTION
Status: DISCONTINUED | OUTPATIENT
Start: 2023-01-30 | End: 2023-02-03 | Stop reason: HOSPADM

## 2023-01-30 RX ORDER — FUROSEMIDE 10 MG/ML
80 INJECTION INTRAMUSCULAR; INTRAVENOUS
Status: COMPLETED | OUTPATIENT
Start: 2023-01-30 | End: 2023-01-30

## 2023-01-30 RX ORDER — SODIUM CHLORIDE 0.9 % (FLUSH) 0.9 %
10 SYRINGE (ML) INJECTION EVERY 12 HOURS PRN
Status: DISCONTINUED | OUTPATIENT
Start: 2023-01-30 | End: 2023-02-03 | Stop reason: HOSPADM

## 2023-01-30 RX ORDER — NALOXONE HCL 0.4 MG/ML
0.02 VIAL (ML) INJECTION
Status: DISCONTINUED | OUTPATIENT
Start: 2023-01-30 | End: 2023-02-03 | Stop reason: HOSPADM

## 2023-01-30 RX ORDER — INSULIN ASPART 100 [IU]/ML
0-5 INJECTION, SOLUTION INTRAVENOUS; SUBCUTANEOUS
Status: DISCONTINUED | OUTPATIENT
Start: 2023-01-30 | End: 2023-02-03 | Stop reason: HOSPADM

## 2023-01-30 RX ORDER — PROCHLORPERAZINE EDISYLATE 5 MG/ML
5 INJECTION INTRAMUSCULAR; INTRAVENOUS EVERY 6 HOURS PRN
Status: DISCONTINUED | OUTPATIENT
Start: 2023-01-30 | End: 2023-02-03 | Stop reason: HOSPADM

## 2023-01-30 RX ORDER — METOPROLOL SUCCINATE 50 MG/1
50 TABLET, EXTENDED RELEASE ORAL DAILY
Status: DISCONTINUED | OUTPATIENT
Start: 2023-01-30 | End: 2023-01-31

## 2023-01-30 RX ORDER — GLUCAGON 1 MG
1 KIT INJECTION
Status: DISCONTINUED | OUTPATIENT
Start: 2023-01-30 | End: 2023-02-02

## 2023-01-30 RX ORDER — ROPINIROLE 1 MG/1
4 TABLET, FILM COATED ORAL DAILY
Status: DISCONTINUED | OUTPATIENT
Start: 2023-01-30 | End: 2023-02-03 | Stop reason: HOSPADM

## 2023-01-30 RX ORDER — FUROSEMIDE 10 MG/ML
80 INJECTION INTRAMUSCULAR; INTRAVENOUS
Status: DISCONTINUED | OUTPATIENT
Start: 2023-01-30 | End: 2023-02-01

## 2023-01-30 RX ORDER — ONDANSETRON 8 MG/1
8 TABLET, ORALLY DISINTEGRATING ORAL EVERY 8 HOURS PRN
Status: DISCONTINUED | OUTPATIENT
Start: 2023-01-30 | End: 2023-02-03 | Stop reason: HOSPADM

## 2023-01-30 RX ORDER — IBUPROFEN 200 MG
24 TABLET ORAL
Status: DISCONTINUED | OUTPATIENT
Start: 2023-01-30 | End: 2023-02-02

## 2023-01-30 RX ADMIN — PROCHLORPERAZINE EDISYLATE 5 MG: 5 INJECTION INTRAMUSCULAR; INTRAVENOUS at 10:01

## 2023-01-30 RX ADMIN — ROPINIROLE 4 MG: 2 TABLET, FILM COATED ORAL at 03:01

## 2023-01-30 RX ADMIN — ONDANSETRON 4 MG: 2 INJECTION INTRAMUSCULAR; INTRAVENOUS at 12:01

## 2023-01-30 RX ADMIN — ISOSORBIDE DINITRATE 30 MG: 30 TABLET ORAL at 03:01

## 2023-01-30 RX ADMIN — ISOSORBIDE DINITRATE 30 MG: 30 TABLET ORAL at 08:01

## 2023-01-30 RX ADMIN — SACUBITRIL AND VALSARTAN 1 TABLET: 97; 103 TABLET, FILM COATED ORAL at 06:01

## 2023-01-30 RX ADMIN — METOPROLOL SUCCINATE 50 MG: 50 TABLET, EXTENDED RELEASE ORAL at 02:01

## 2023-01-30 RX ADMIN — FUROSEMIDE 80 MG: 10 INJECTION, SOLUTION INTRAMUSCULAR; INTRAVENOUS at 12:01

## 2023-01-30 RX ADMIN — APIXABAN 5 MG: 5 TABLET, FILM COATED ORAL at 08:01

## 2023-01-30 RX ADMIN — IPRATROPIUM BROMIDE AND ALBUTEROL SULFATE 3 ML: 2.5; .5 SOLUTION RESPIRATORY (INHALATION) at 08:01

## 2023-01-30 RX ADMIN — IPRATROPIUM BROMIDE AND ALBUTEROL SULFATE 3 ML: 2.5; .5 SOLUTION RESPIRATORY (INHALATION) at 03:01

## 2023-01-30 RX ADMIN — FUROSEMIDE 80 MG: 10 INJECTION, SOLUTION INTRAMUSCULAR; INTRAVENOUS at 06:01

## 2023-01-30 RX ADMIN — ONDANSETRON 8 MG: 8 TABLET, ORALLY DISINTEGRATING ORAL at 06:01

## 2023-01-30 RX ADMIN — GUAIFENESIN AND DEXTROMETHORPHAN 5 ML: 100; 10 SYRUP ORAL at 10:01

## 2023-01-30 RX ADMIN — ATORVASTATIN CALCIUM 40 MG: 40 TABLET, FILM COATED ORAL at 08:01

## 2023-01-30 NOTE — H&P
[facility] - Emergency Dept  [facility] Medicine  History & Physical    Patient Name: Hafsa Hawley  MRN: [id number]  Patient Class: IP- Inpatient  Admission Date: 1/30/2023  Attending Physician: Cheng Roberts MD   Primary Care Provider: Cristobal Ann MD         Patient information was obtained from patient, past medical records and ER records.     Subjective:     Principal Problem:Chronic combined systolic and diastolic heart failure    Chief Complaint:   Chief Complaint   Patient presents with    Shortness of Breath    Vomiting    Fatigue     States on list for heart and kidney xplant, started vomiting on way to clinic        HPI: Mrs. Hafsa Hawley is a 57 y.o Female with a PMHx of NICM HFrEF (EF 10%) s/p AICD placement, Paroxysmal A Fib on Eliquis, Pulmonary Hypertension, Hypertension, Type 2 Diabetes Mellitus presenting with shortness of breath. She states that her dyspnea started a few days ago which she has been attempting to manage at home, but has had progressive worsening over the course of today while she was walking from her car to her Cardiologist appointment this morning. Her dyspnea was accompanied by subsequent nausea with multiple episodes of vomiting, after which she was prompted to defer to the Emergency Department. She additionally endorses intermittent chest tightness that has been ongoing for the last month, located from neck to sternum. She endorses decreased activity tolerance, chills, dizziness with headaches. Additionally notes sore throat and cough present for one year. She does not use oxygen at home.    In the Emergency Department, she was afebrile and hypertensive 155/96, hypoxic 88% on room air that improved to 92% on 2L NC. Initial CBC were remarkable for Hgb 8.0, baseline ~8.5. No leukocytosis. Initial CMP was remarkable for Creatinine 2.3, baseline approximately 1.8. BNP 2470, elevated above baseline. Troponin 0.623, below baseline 0.76. Chest X Ray was concerning for  pulmonary edema and dense left basilar opacity. She was given Lasix 80 mg IV x 1 in the Emergency Department. She was admitted to Hospital Medicine for further evaluation, management, and coordination of care.      Past Medical History:   Diagnosis Date    Anemia     Anticoagulant long-term use     Arthritis     Atrial fibrillation     CKD (chronic kidney disease), stage IV 05/08/2018    Congestive heart failure     COPD (chronic obstructive pulmonary disease)     Deep vein thrombosis     Depression     elevated bilirubin d/t Gilbert's syndrome     confirmed by [facility] genetic testing, evaluated by hepatology    Encounter for blood transfusion     GERD (gastroesophageal reflux disease)     Hypertension     Hypertensive cardiovascular-renal disease, stage 1-4 or unspecified chronic kidney disease, with heart failure 03/04/2022    Pheochromocytoma, malignant     Restrictive lung disease 04/26/2022    Right kidney mass     Sleep apnea     Thalassemia trait, alpha     Thyroid disease     Type 2 diabetes mellitus with hyperglycemia, without long-term current use of insulin 08/13/2020       Past Surgical History:   Procedure Laterality Date    APPENDECTOMY      BONE MARROW BIOPSY      CARDIAC DEFIBRILLATOR PLACEMENT Left 12/2016    CARDIAC ELECTROPHYSIOLOGY MAPPING AND ABLATION      CARDIAC ELECTROPHYSIOLOGY MAPPING AND ABLATION      COLONOSCOPY N/A 5/6/2022    Procedure: COLONOSCOPY;  Surgeon: Arely Betancourt MD;  Location: [facility] ([address] [place] [address]);  Service: Endoscopy;  Laterality: N/A;  heart transplant candidate/ EF 25% - 2nd floor/ defib - [facility]ron[facility] - ER[facility]  Eliquis - per Dr. Cortez with [facility], Pt ok to hold Eliquis x 2 days prior-see media tab-outside correspondence dated 12/30/21  - [facility]  verbal instructions/portal instructions/email instructions - s    EYE SURGERY      due to running tears    FRACTURE SURGERY Left     hand 5th digit    HYSTERECTOMY      KNEE SURGERY Left 2016    hematoma  "   LIVER BIOPSY  10/24/2018    Minimal steatosis, predominantly macrovesicular, 1%, Minimal nonspecific portal inflammation, no fibrosis. No findings on biopsy to explain elevated bilirubin levels. Could be d/t Gilbert's =?- hemolysis    RIGHT HEART CATHETERIZATION Right 12/07/2021    Procedure: INSERTION, CATHETER, RIGHT HEART;  Surgeon: Irving Cardenas MD;  Location: [facility];  Service: Cardiology;  Laterality: Right;    RIGHT HEART CATHETERIZATION Right 12/19/2022    Procedure: INSERTION, CATHETER, RIGHT HEART;  Surgeon: Burke Camilo MD;  Location: [facility];  Service: Cardiology;  Laterality: Right;    TRANSJUGULAR BIOPSY OF LIVER N/A 10/24/2018    Procedure: BIOPSY, LIVER, TRANSJUGULAR APPROACH;  Surgeon: [facility] Provider;  Location: [facility] OR [address] [place] [address];  Service: Radiology;  Laterality: N/A;       Review of patient's allergies indicates:   Allergen Reactions    Penicillins Hives and Other (See Comments)    Iodinated contrast media Nausea And Vomiting    Oxycodone-acetaminophen Other (See Comments)     Nausea, Dizziness, Anxiety.  "I don't like how it makes me feel."   Given Hydromorphone 0.5mg IVP  Without problems.  Other reaction(s): Other (See Comments)    Clonazepam Other (See Comments)    Diovan hct [valsartan-hydrochlorothiazide] Other (See Comments)     Causes coughing    Iodine Other (See Comments)    Irinotecan      Pt has homozygosity for the TA7 promoter variant that places this individual at significantly increased risk for   severe neutropenia (grade 4) when treated with the standard dose of irinotecan (risk approximately 50%).   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 Metabolism of other drugs not listed here may also be impacted by UGT1A1 enzyme activity.       Tramadol Nausea And Vomiting and Other (See Comments)     Other reaction(s): " Other (See Comments)    Valsartan Other (See Comments)       Current Facility-Administered Medications on File Prior to Encounter   Medication    0.9%  NaCl infusion    vancomycin in dextrose 5 % 1 gram/250 mL IVPB 1,000 mg     Current Outpatient Medications on File Prior to Encounter   Medication Sig    albuterol-ipratropium (DUO-NEB) 2.5 mg-0.5 mg/3 mL nebulizer solution Take 3 mLs by nebulization 2 (two) times a day.    allopurinoL (ZYLOPRIM) 100 MG tablet Take 1 tablet (100 mg total) by mouth once daily.    apixaban (ELIQUIS) 5 mg Tab Take 1 tablet (5 mg total) by mouth 2 (two) times daily.    atorvastatin (LIPITOR) 40 MG tablet Take 1 tablet (40 mg total) by mouth every evening.    b complex vitamins tablet Take 1 tablet by mouth once daily.    blood sugar diagnostic (ACCU-CHEK GUIDE TEST STRIPS) Strp 1 strip by Other route 3 (three) times daily.    cetirizine (ZYRTEC) 10 MG tablet Take 10 mg by mouth daily as needed for Allergies.    diclofenac sodium (VOLTAREN) 1 % Gel APPLY 2 G TOPICALLY 3 (THREE) TIMES DAILY AS NEEDED (PAIN).    DULoxetine (CYMBALTA) 30 MG capsule Take 1 capsule orally once a day.    empagliflozin (JARDIANCE) 25 mg tablet Take 25 mg by mouth once daily.    ergocalciferol (ERGOCALCIFEROL) 50,000 unit Cap Take 1 capsule (50,000 Units total) by mouth every 7 days. (Patient taking differently: Take 50,000 Units by mouth every Saturday.)    ferrous sulfate 325 (65 FE) MG EC tablet Take 1 tablet (325 mg total) by mouth once daily.    fluticasone propionate (FLONASE) 50 mcg/actuation nasal spray 2 sprays by Each Nostril route daily as needed for Rhinitis.     furosemide (LASIX) 80 MG tablet Take 1 tablet (80 mg total) by mouth 2 (two) times daily.    glimepiride (AMARYL) 2 MG tablet TAKE 1 TABLET BY MOUTH EVERY DAY WITH BREAKFAST    hydrALAZINE (APRESOLINE) 100 MG tablet Take 1 tablet (100 mg total) by mouth 3 (three) times daily.    isosorbide dinitrate (ISORDIL) 30 MG Tab  Take 1 tablet (30 mg total) by mouth 3 (three) times daily.    lancets (ACCU-CHEK SOFTCLIX LANCETS) Misc 1 Device by Misc.(Non-Drug; Combo Route) route 3 (three) times daily.    LIDOcaine (LIDODERM) 5 % Place 1 patch onto the skin once daily. Remove & Discard patch within 12 hours or as directed by MD    metoprolol succinate (TOPROL-XL) 50 MG 24 hr tablet Take 1 tablet (50 mg total) by mouth once daily.    mometasone-formoterol (DULERA) 200-5 mcg/actuation inhaler Inhale 2 puffs into the lungs 2 (two) times daily. Controller    montelukast (SINGULAIR) 10 mg tablet Take 1 tablet (10 mg total) by mouth once daily.    pantoprazole (PROTONIX) 40 MG tablet TAKE 1 TABLET BY MOUTH DAILY IN THE MORNING    potassium chloride (MICRO-K) 10 MEQ CpSR Take 1 capsule (10 mEq total) by mouth once daily.    rOPINIRole (REQUIP) 4 MG tablet Take 1 tablet (4 mg total) by mouth once daily. Pt taking 4mg daily    sacubitriL-valsartan (ENTRESTO)  mg per tablet Take 1 tablet by mouth 2 (two) times daily.    scopolamine (TRANSDERM-SCOP) 1.3-1.5 mg (1 mg over 3 days) Place 1 patch onto the skin every 72 hours as needed (nausea).    SPIRIVA WITH HANDIHALER 18 mcg inhalation capsule INHALE 1 CAPSULE INTO THE LUNGS ONCE DAILY.    VENTOLIN HFA 90 mcg/actuation inhaler Inhale 2 puffs into the lungs every 6 (six) hours as needed for Shortness of Breath or Wheezing.    acetaminophen (TYLENOL) 500 MG tablet Take 1,000 mg by mouth 2 (two) times daily as needed for Pain.    ALPRAZolam (XANAX) 2 MG Tab Take 0.25-2 mg by mouth 2 (two) times daily as needed (anxiety).    amiodarone (PACERONE) 200 MG Tab Take 1 tablet (200 mg total) by mouth once daily. (Patient not taking: Reported on 1/20/2023)    mv,Ca,min/iron/FA/guarana/caff (ONE-A-DAY WOMEN'S ACTIVE ORAL) Take 1 tablet by mouth once daily.    NITROSTAT 0.4 mg SL tablet Take 2.5 tablets (1 mg total) by mouth every 5 (five) minutes as needed for Chest pain. No more than 3  tablets in 15 minutes.    ondansetron (ZOFRAN-ODT) 4 MG TbDL Take 1 tablet (4 mg total) by mouth 2 (two) times daily.    promethazine-codeine 6.25-10 mg/5 ml (PHENERGAN WITH CODEINE) 6.25-10 mg/5 mL syrup Take 5 ml by mouth every 4-6 hours as needed.     Family History       Problem Relation (Age of Onset)    Cancer Mother    Cirrhosis Brother    Diabetes Brother    Heart attack Father    Heart disease Father, Sister, Brother, Sister, Brother    Hypertension Father, Brother          Tobacco Use    Smoking status: Never    Smokeless tobacco: Never   Substance and Sexual Activity    Alcohol use: Yes     Comment: up to 1 yr ago drank 2-3 drinks on occasion but sporadic    Drug use: No    Sexual activity: Yes     Partners: Male     Review of Systems   Constitutional:  Positive for activity change, chills and fatigue. Negative for appetite change and fever.   HENT:  Positive for sore throat. Negative for congestion, ear pain, postnasal drip, rhinorrhea and sinus pressure.    Eyes:  Negative for photophobia, pain and visual disturbance.   Respiratory:  Positive for cough, chest tightness and shortness of breath. Negative for wheezing.    Cardiovascular:  Negative for chest pain, palpitations and leg swelling.   Gastrointestinal:  Positive for nausea and vomiting. Negative for abdominal pain, constipation and diarrhea.   Endocrine: Negative for cold intolerance and heat intolerance.   Genitourinary:  Negative for dysuria, flank pain, frequency and hematuria.   Musculoskeletal:  Negative for arthralgias and myalgias.   Skin:  Negative for pallor and rash.   Allergic/Immunologic: Negative for immunocompromised state.   Neurological:  Positive for headaches. Negative for dizziness, syncope, weakness, light-headedness and numbness.   Hematological:  Does not bruise/bleed easily.   Psychiatric/Behavioral:  Negative for agitation, confusion and dysphoric mood. The patient is not nervous/anxious.    Objective:     Vital  Signs (Most Recent):  Temp: 98.7 °F (37.1 °C) (01/30/23 1408)  Pulse: 83 (01/30/23 1408)  Resp: 20 (01/30/23 1008)  BP: (!) 164/97 (01/30/23 1408)  SpO2: (!) 92 % (01/30/23 1408)   Vital Signs (24h Range):  Temp:  [98.5 °F (36.9 °C)-98.9 °F (37.2 °C)] 98.7 °F (37.1 °C)  Pulse:  [] 83  Resp:  [20-28] 20  SpO2:  [85 %-92 %] 92 %  BP: (151-164)/(95-97) 164/97     Weight: 89.8 kg (198 lb)  Body mass index is 31.96 kg/m².    Physical Exam  Vitals and nursing note reviewed.   Constitutional:       General: She is awake. She is not in acute distress.     Appearance: She is obese. She is not ill-appearing, toxic-appearing or diaphoretic.   HENT:      Head: Normocephalic and atraumatic.      Nose: Nose normal. No congestion or rhinorrhea.      Mouth/Throat:      Mouth: Mucous membranes are moist.      Pharynx: No oropharyngeal exudate or posterior oropharyngeal erythema.   Eyes:      General: No scleral icterus.        Right eye: No discharge.         Left eye: No discharge.      Extraocular Movements: Extraocular movements intact.      Pupils: Pupils are equal, round, and reactive to light.   Neck:      Vascular: No JVD.   Cardiovascular:      Rate and Rhythm: Normal rate and regular rhythm.      Pulses: Normal pulses.      Heart sounds: No murmur heard.    No friction rub.   Pulmonary:      Effort: Pulmonary effort is normal. No respiratory distress.      Breath sounds: Rales present. No wheezing or rhonchi.   Chest:      Chest wall: No tenderness.   Abdominal:      General: Bowel sounds are normal. There is no distension.      Palpations: Abdomen is soft.      Tenderness: There is no abdominal tenderness. There is no guarding or rebound.   Musculoskeletal:         General: No swelling or tenderness.      Cervical back: Normal range of motion. No rigidity.      Right lower leg: No edema.      Left lower leg: No edema.   Skin:     General: Skin is warm and dry.      Capillary Refill: Capillary refill takes less than 2  seconds.      Findings: No erythema or rash.   Neurological:      General: No focal deficit present.      Mental Status: She is alert and oriented to person, place, and time.      Sensory: No sensory deficit.      Motor: No weakness.   Psychiatric:         Mood and Affect: Mood normal.         Behavior: Behavior normal. Behavior is cooperative.         Thought Content: Thought content normal.         CRANIAL NERVES     CN III, IV, VI   Pupils are equal, round, and reactive to light.     Significant Labs: All pertinent labs within the past 24 hours have been reviewed.    Significant Imaging: I have reviewed all pertinent imaging results/findings within the past 24 hours.    Assessment/Plan:     * Acute on chronic combined systolic and diastolic heart failure  Echo (11/2022) showed EF 10%, combined  Home meds: Metoprolol, Entresto  Dry weight: 89.8  BNP 2470  S/p AICD    Chest X Ray (1/30/23): Monitoring leads overlie the chest.  Left subclavian approach AICD, as before. Enlarged cardiac silhouette and prominence and indistinctness of central pulmonary vasculature. Prominent markings could reflect pulmonary edema, noting that infectious or inflammatory etiology not excluded. Dense retrocardiac left basilar opacity could relate to atelectasis, aspiration or pneumonia. Small pleural effusions not excluded. No definite pneumothorax.     Plan:  Decompensated  - HTS consulted, following, appreciated  - Lasix 80 mg IV BID  - Continue to Metoprolol, Entresto  - Daily weights (standing if tolerated)  - Strict I/Os  - Fluid restriction at 1500mL  - Cardiac diet  - Telemetry, continuous    Demand ischemia  Troponin 0.623 on admission  Baseline troponin ~ 0.76    Plan:  Likely demand ischemia in setting of ADHF  - Repeat troponin, trend  - Consider Cardiology consultation if elevating  - Continue to monitor  - EKG PRN      Acute on chronic respiratory failure  Patient with Hypoxic Respiratory failure which is Acute on chronic.   she is not on home oxygen. Supplemental oxygen was provided and noted-  .   Signs/symptoms of respiratory failure include- tachypnea, increased work of breathing and respiratory distress. Contributing diagnoses includes - CHF Labs and images were reviewed. Patient Has recent ABG, which has been reviewed. Will treat underlying causes and adjust management of respiratory failure    Hypoxic 88% on room air on admission    Plan:  Likely 2/2 Heart Failure Exacerbation  - CTA PE studies deferred given allergy and GFR  - Supplemental Oxygen as needed   - Wean as able  - Consider 6MWT on discharge    Type 2 diabetes mellitus without complication, without long-term current use of insulin  Patient's FSGs are controlled on current medication regimen.  Last A1c reviewed-   Lab Results   Component Value Date    HGBA1C 4.6 01/04/2023     Most recent fingerstick glucose reviewed- No results for input(s): POCTGLUCOSE in the last 24 hours.  Current correctional scale  Low  Maintain anti-hyperglycemic dose as follows-   Antihyperglycemics (From admission, onward)    Start     Stop Route Frequency Ordered    01/30/23 1343  insulin aspart U-100 pen 0-5 Units         -- SubQ Before meals & nightly PRN 01/30/23 1244        Plan:  - Goal 140-180  - Hold Oral hypoglycemics while patient is in the hospital  - LDSSI, adjust as needed  - Cardiac-diabetic diet if not NPO  - POCT accuchecks     ICD (implantable cardioverter-defibrillator) in place  See Acute on Chronic Heart Failure Exacerbation      CKD (chronic kidney disease), stage IV  Baseline Cr 1.8, Cr 2.3 on admission  Etiology likely 2/2 CRS  Associated proteinuria  No indications for HD at this time    Plan:  - Stable  - Trend Cr  - Strict I&Os with daily standing weights  - Avoid nephrotoxic agents (NSAIDs, ACEi/ARB, Gadolinium, IV contrast)  - Renally adjust medications to current GFR  - MAPs > 65    Paroxysmal atrial fibrillation  Patient with Persistent (7 days or more) atrial  fibrillation which is controlled currently with Beta Blocker. Patient is currently in sinus rhythm.[id number] Score: 3. HASBLED Score: . Anticoagulation indicated. Anticoagulation done with Eliquis.    Current rhythm: Sinus  Home meds: Apixaban, Metoprolol  [id number] 3  HAS-BLED     Plan:  - Control with Metoprolol  - Anticoagulation with Apixaban  - Telemetry, continuous  - Maintain K > 4, Mg > 2, Ca wnl        MELISSA (obstructive sleep apnea)  Patient states she does not use a CPAP at home    Plan:  - CPAP nightly      Essential hypertension  Patient on Entresto, Metoprolol, Isosorbide, Hydralazine, Lasix at home    Plan:  - Continue home medications        VTE Risk Mitigation (From admission, onward)         Ordered     apixaban tablet 5 mg  2 times daily         01/30/23 1244     Reason for No Pharmacological VTE Prophylaxis  Once        Question:  Reasons:  Answer:  Already adequately anticoagulated on oral Anticoagulants    01/30/23 1244     Place sequential compression device  Until discontinued         01/30/23 1244     IP VTE HIGH RISK PATIENT  Once         01/30/23 1244                   Hattie Dallas DO  Department of Hospital Medicine   [facility] - Emergency Dept

## 2023-01-30 NOTE — ED PROVIDER NOTES
"Encounter Date: 1/30/2023       History     Chief Complaint   Patient presents with    Shortness of Breath    Vomiting    Fatigue     States on list for heart and kidney xplant, started vomiting on way to clinic     Ms. Hawley is a 57-year-old female with past medical history of NICM, HFrEF (LVEF 10%), s/p ICD, pulmonary hypertension, DM, HTN, pAF who presents to the emergency department due to shortness of breath, nausea and vomiting. Patient states that she was on her way to cardiology clinic when she suddenly had multiple episodes of vomiting she also stated that she has been feeling very short of breath for the past few days but she had been trying to manage it at home.     The history is provided by the patient.   Review of patient's allergies indicates:   Allergen Reactions    Penicillins Hives and Other (See Comments)    Iodinated contrast media Nausea And Vomiting    Oxycodone-acetaminophen Other (See Comments)     Nausea, Dizziness, Anxiety.  "I don't like how it makes me feel."   Given Hydromorphone 0.5mg IVP  Without problems.  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 Metabolism of other drugs not listed here may also be impacted by UGT1A1 enzyme activity.       Tramadol Nausea And Vomiting and Other (See Comments)     Other reaction(s): Other (See Comments)    Valsartan Other (See Comments)     Past Medical History:   Diagnosis Date    Anemia     " Anticoagulant long-term use     Arthritis     Atrial fibrillation     CKD (chronic kidney disease), stage IV 05/08/2018    Congestive heart failure     COPD (chronic obstructive pulmonary disease)     Deep vein thrombosis     Depression     elevated bilirubin d/t Gilbert's syndrome     confirmed by [place] genetic testing, evaluated by hepatology    Encounter for blood transfusion     GERD (gastroesophageal reflux disease)     Hypertension     Hypertensive cardiovascular-renal disease, stage 1-4 or unspecified chronic kidney disease, with heart failure 03/04/2022    Pheochromocytoma, malignant     Restrictive lung disease 04/26/2022    Right kidney mass     Sleep apnea     Thalassemia trait, alpha     Thyroid disease     Type 2 diabetes mellitus with hyperglycemia, without long-term current use of insulin 08/13/2020     Past Surgical History:   Procedure Laterality Date    APPENDECTOMY      BONE MARROW BIOPSY      CARDIAC DEFIBRILLATOR PLACEMENT Left 12/2016    CARDIAC ELECTROPHYSIOLOGY MAPPING AND ABLATION      CARDIAC ELECTROPHYSIOLOGY MAPPING AND ABLATION      COLONOSCOPY N/A 5/6/2022    Procedure: COLONOSCOPY;  Surgeon: Arely Betancourt MD;  Location: [facility] ([address] [place] [address]);  Service: Endoscopy;  Laterality: N/A;  heart transplant candidate/ EF 25% - 2nd floor/ defib - Biotron[facility] - ER[facility]  Eliquis - per Dr. Cortez with [facility], Pt ok to hold Eliquis x 2 days prior-see media tab-outside correspondence dated 12/30/21  - [facility]  verbal instructions/portal instructions/email instructions - s    EYE SURGERY      due to running tears    FRACTURE SURGERY Left     hand 5th digit    HYSTERECTOMY      KNEE SURGERY Left 2016    hematoma    LIVER BIOPSY  10/24/2018    Minimal steatosis, predominantly macrovesicular, 1%, Minimal nonspecific portal inflammation, no fibrosis. No findings on biopsy to explain elevated bilirubin levels. Could be d/t Gilbert's =?- hemolysis    RIGHT HEART CATHETERIZATION Right 12/07/2021    Procedure:  INSERTION, CATHETER, RIGHT HEART;  Surgeon: Irving Cardenas MD;  Location: [facility] CATH LAB;  Service: Cardiology;  Laterality: Right;    RIGHT HEART CATHETERIZATION Right 2022    Procedure: INSERTION, CATHETER, RIGHT HEART;  Surgeon: Burke Cmailo MD;  Location: [facility] CATH LAB;  Service: Cardiology;  Laterality: Right;    TRANSJUGULAR BIOPSY OF LIVER N/A 10/24/2018    Procedure: BIOPSY, LIVER, TRANSJUGULAR APPROACH;  Surgeon: Carmen Diagnostic Provider;  [facility];  Service: Radiology;  Laterality: N/A;     Family History   Problem Relation Age of Onset    Cancer Mother         pancreatic CA early 50's    Heart disease Father          MI in late 50's    Hypertension Father     Heart attack Father     Heart disease Sister     Heart disease Brother     Cirrhosis Brother         alcoholic    Heart disease Sister     Heart disease Brother     Hypertension Brother     Diabetes Brother      Social History     Tobacco Use    Smoking status: Never    Smokeless tobacco: Never   Substance Use Topics    Alcohol use: Yes     Comment: up to 1 yr ago drank 2-3 drinks on occasion but sporadic    Drug use: No     Review of Systems   Constitutional:  Positive for fatigue. Negative for chills, diaphoresis and fever.   HENT:  Negative for congestion, rhinorrhea and sore throat.    Eyes:  Negative for visual disturbance.   Respiratory:  Positive for cough and shortness of breath. Negative for chest tightness.    Cardiovascular:  Negative for chest pain.   Gastrointestinal:  Positive for nausea and vomiting. Negative for abdominal pain, blood in stool, constipation and diarrhea.   Genitourinary:  Negative for dysuria, hematuria and urgency.   Musculoskeletal:  Negative for back pain.   Skin:  Negative for rash.   Neurological:  Negative for seizures and syncope.   Hematological:  Does not bruise/bleed easily.   Psychiatric/Behavioral:  Negative for agitation and hallucinations.      Physical Exam     Initial  Vitals [01/30/23 0848]   BP Pulse Resp Temp SpO2   (!) 155/96 100 (!) 28 98.9 °F (37.2 °C) (!) 88 %      MAP       --         Physical Exam     Nursing note and vitals reviewed.  Constitutional: Patient appears well-developed and well-nourished.  Anxious appearing patient   AxOx3, NAD, well nourished, appears stated age  HENT:   Head: Normocephalic and atraumatic.   Eyes: Conjunctivae and EOM are normal. Pupils are equal, round, and reactive to light. no scleral icterus, no periorbital edema or ecchymosis  Neck: Neck supple. no stridor, no masses, no drooling or voice changes  Normal range of motion.  Cardiovascular: Normal rate, regular rhythm, normal heart sounds and intact distal pulses.  JVD noted  Pulmonary/Chest: Tachypnea. CTAB, no wheezes, rales or rhonchi, no increased work of breathing  Abdominal: Abdomen is soft. Patient exhibits no distension. There is no abdominal tenderness. no organomegaly, no CVAT  Musculoskeletal:      Cervical back: Normal range of motion and neck supple.   Neurological: Patient is alert and oriented to person, place, and time. No cranial nerve deficit. Gait normal. GCS score is 15. Moving all extremities, gait intact, face grossly symmetric  Skin: Skin is warm and dry.  Ext: 2+ pitting  edema bilaterally, no lesions, rashes, or deformity  Psych: Normal mood/affect,cooperative, well groomed, makes good eye contact        ED Course   Procedures  Labs Reviewed   CBC W/ AUTO DIFFERENTIAL - Abnormal; Notable for the following components:       Result Value    Hemoglobin 8.0 (*)     Hematocrit 27.5 (*)     MCV 60 (*)     MCH 17.5 (*)     MCHC 29.1 (*)     RDW 28.9 (*)     Immature Granulocytes 1.3 (*)     Immature Grans (Abs) 0.08 (*)     Lymph # 0.6 (*)     nRBC 6 (*)     Gran % 83.9 (*)     Lymph % 9.1 (*)     All other components within normal limits   COMPREHENSIVE METABOLIC PANEL - Abnormal; Notable for the following components:    CO2 20 (*)     BUN 41 (*)     Creatinine 2.3 (*)      Total Bilirubin 2.3 (*)     Alkaline Phosphatase 50 (*)     eGFR 24.2 (*)     All other components within normal limits   B-TYPE NATRIURETIC PEPTIDE - Abnormal; Notable for the following components:    BNP 2,470 (*)     All other components within normal limits   TROPONIN I - Abnormal; Notable for the following components:    Troponin I 0.623 (*)     All other components within normal limits   LIPASE   URINALYSIS, REFLEX TO URINE CULTURE   SARS-COV2 (COVID) WITH FLU/RSV BY PCR   HEMOGLOBIN A1C   MAGNESIUM   TROPONIN I   TYPE & SCREEN     EKG Readings: (Independently Interpreted)   Initial Reading: No STEMI. Rhythm: Normal Sinus Rhythm. Heart Rate: 94.   Normal sinus rhythm     Imaging Results              X-Ray Chest AP Portable (Final result)  Result time 01/30/23 10:19:02      Final result by Yefri Self MD (01/30/23 10:19:02)                   Impression:      As above.      Electronically signed by: Yefri Self  Date:    01/30/2023  Time:    10:19               Narrative:    EXAMINATION:  XR CHEST AP PORTABLE    CLINICAL HISTORY:  Shortness of breath    TECHNIQUE:  Single frontal view of the chest was performed.    COMPARISON:  Chest radiograph 01/04/2023, 12:36 hours    FINDINGS:  Monitoring leads overlie the chest.  Left subclavian approach AICD, as before.    Enlarged cardiac silhouette and prominence and indistinctness of central pulmonary vasculature.    Prominent markings could reflect pulmonary edema, noting that infectious or inflammatory etiology not excluded.    Dense retrocardiac left basilar opacity could relate to atelectasis, aspiration or pneumonia.    Small pleural effusions not excluded.    No definite pneumothorax.    No acute findings the visualized abdomen    Osseous and soft tissue structures appear without definite acute change.                                    X-Rays:   Independently Interpreted Readings:   Chest X-Ray: No infiltrates. Cardiomegaly present.  Increased  vascular markings consistent with CHF are present. No pneumothorax   Medications   apixaban tablet 5 mg (has no administration in time range)   atorvastatin tablet 40 mg (has no administration in time range)   hydrALAZINE tablet 100 mg (has no administration in time range)   isosorbide dinitrate tablet 30 mg (has no administration in time range)   metoprolol succinate (TOPROL-XL) 24 hr tablet 50 mg (has no administration in time range)   nitroGLYCERIN SL tablet 1 mg (has no administration in time range)   rOPINIRole tablet 4 mg (has no administration in time range)   tiotropium bromide 2.5 mcg/actuation inhaler 2 puff (has no administration in time range)   sodium chloride 0.9% flush 10 mL (has no administration in time range)   naloxone 0.4 mg/mL injection 0.02 mg (has no administration in time range)   glucose chewable tablet 16 g (has no administration in time range)   glucose chewable tablet 24 g (has no administration in time range)   dextrose 50% injection 12.5 g (has no administration in time range)   dextrose 50% injection 25 g (has no administration in time range)   glucagon (human recombinant) injection 1 mg (has no administration in time range)   sodium chloride 0.9% flush 10 mL (has no administration in time range)   furosemide injection 80 mg (has no administration in time range)   ondansetron disintegrating tablet 8 mg (has no administration in time range)   insulin aspart U-100 pen 0-5 Units (has no administration in time range)   melatonin tablet 6 mg (has no administration in time range)   furosemide injection 80 mg (80 mg Intravenous Given 1/30/23 1213)   ondansetron injection 4 mg (4 mg Intravenous Given 1/30/23 1214)     Medical Decision Making:   History:   Old Medical Records: I decided to obtain old medical records.  Old Records Summarized: records from previous admission(s).       <> Summary of Records: Patient was admitted on 01/04/2023  Admitted to hospital medicine from cardiology clinic  for dyspnea and fatigue. Presentation in keeping with CHF, possible progression of disease. She was able to tolerate PO intake without vomiting as well as with improvement of her fatigue after eating. AICD interrogated 1/5. HTS consulted to evaluate patients advanced HF options, awaiting recs. Renal funtion stable with diuresis with patient able to wean supplemental O2 to 2L which she uses PRN at home. Failed 6 minute walk test, will need oxygen all of the time instead of prn. After diuresing 2.9L, patients BNP improved to 300s, and there was a small bump in Cr. Patient transitioned to PO lasix 80 BID. Metoprolol succinate restarted at 50mg. Patient continued to show adequate response to her PO dose of lasix. Stable and medically ready for discharge on 1/8/23 with HTS, nephrology and palliative care referrals as well as outpatient renal function panel to be completed in 1 week.   Initial Assessment:   Ms. Hawley is a 57-year-old female with past medical history of NICM, HFrEF (LVEF 10%), s/p ICD, pulmonary hypertension, DM, HTN, pAF who presents emergency department due to shortness of breath nausea and vomiting.  Differential Diagnosis:   CHF exacerbation  ACS  Acute Valve Dysfunction  Renal Failure  Sepsis  Independently Interpreted Test(s):   I have ordered and independently interpreted X-rays - see prior notes.  I have ordered and independently interpreted EKG Reading(s) - see prior notes  Clinical Tests:   Lab Tests: Ordered and Reviewed  Radiological Study: Ordered and Reviewed  ED Management:    Patient was thoroughly examined  EKG Showed Regular rate and rhythm.    Patient was saturating in the 80s on room air so she was put on 2 L which improved her saturation.   She had decreased breath sounds noted to the bilateral lungs.  Abdomen was distended but soft and nontender.  There is 2+ lower extremity edema.  Alert and oriented.    Labs were obtained which were significant for elevated BNP and  troponin  Patient was given IV Lasix.  Chest x-ray consistent with pulmonary edema.  She has no infectious symptoms and I do not feel that this represents pneumonia.  Phone conversation was had with cardiology who stated that patient can be followed by her transplant after she is admitted to medicine.   After discussion of medicine she was admitted for further management of CHF exacerbation             Attending Attestation:   Physician Attestation Statement for Resident:  As the supervising MD   Physician Attestation Statement: I have personally seen and examined this patient.   I agree with the above history.  -: Hemodynamically stable.  Not requiring ventilatory support.  Makes urine.  Patient clinically with poor ejection fraction and acute decompensated heart failure.  Will give a bolus of IV Lasix, aspirin admit for further evaluation.   As the supervising MD I agree with the above PE.     As the supervising MD I agree with the above treatment, course, plan, and disposition.                               Clinical Impression:   Final diagnoses:  [R06.02] SOB (shortness of breath)  [R06.02] Shortness of breath  [I50.9] Acute on chronic congestive heart failure, unspecified heart failure type (Primary)        ED Disposition Condition    Admit Stable                Ramy Barnes MD  Resident  01/30/23 1255       Yan Stallworth MD  02/08/23 1523

## 2023-01-30 NOTE — ASSESSMENT & PLAN NOTE
Baseline Cr 1.8, Cr 2.3 on admission  Etiology likely 2/2 CRS  Associated proteinuria  No indications for HD at this time    Plan:  - Stable  - Trend Cr  - Strict I&Os with daily standing weights  - Avoid nephrotoxic agents (NSAIDs, ACEi/ARB, Gadolinium, IV contrast)  - Renally adjust medications to current GFR  - MAPs > 65

## 2023-01-30 NOTE — ASSESSMENT & PLAN NOTE
Patient with Hypoxic Respiratory failure which is Acute on chronic.  she is not on home oxygen. Supplemental oxygen was provided and noted-  .   Signs/symptoms of respiratory failure include- tachypnea, increased work of breathing and respiratory distress. Contributing diagnoses includes - CHF Labs and images were reviewed. Patient Has recent ABG, which has been reviewed. Will treat underlying causes and adjust management of respiratory failure    Hypoxic 88% on room air on admission    Plan:  Likely 2/2 Heart Failure Exacerbation  - CTA PE studies deferred given allergy and GFR  - Supplemental Oxygen as needed   - Wean as able  - Consider 6MWT on discharge

## 2023-01-30 NOTE — ASSESSMENT & PLAN NOTE
Patient with Persistent (7 days or more) atrial fibrillation which is controlled currently with Beta Blocker. Patient is currently in sinus rhythm.[id number] Score: 3. HASBLED Score: . Anticoagulation indicated. Anticoagulation done with Eliquis.    Current rhythm: Sinus  Home meds: Apixaban, Metoprolol  [id number] 3  HAS-BLED     Plan:  - Control with Metoprolol  - Anticoagulation with Apixaban  - Telemetry, continuous  - Maintain K > 4, Mg > 2, Ca wnl

## 2023-01-30 NOTE — HPI
Mrs. Hafsa Hawley is a 57 y.o Female with a PMHx of NICM HFrEF (EF 10%) s/p AICD placement, Paroxysmal A Fib on Eliquis, Pulmonary Hypertension, Hypertension, Type 2 Diabetes Mellitus presenting with shortness of breath. She states that her dyspnea started a few days ago which she has been attempting to manage at home, but has had progressive worsening over the course of today while she was walking from her car to her Cardiologist appointment this morning. Her dyspnea was accompanied by subsequent nausea with multiple episodes of vomiting, after which she was prompted to defer to the Emergency Department. She additionally endorses intermittent chest tightness that has been ongoing for the last month, located from neck to sternum. She endorses decreased activity tolerance, chills, dizziness with headaches. Additionally notes sore throat and cough present for one year. She does not use oxygen at home.    In the Emergency Department, she was afebrile and hypertensive 155/96, hypoxic 88% on room air that improved to 92% on 2L NC. Initial CBC were remarkable for Hgb 8.0, baseline ~8.5. No leukocytosis. Initial CMP was remarkable for Creatinine 2.3, baseline approximately 1.8. BNP 2470, elevated above baseline. Troponin 0.623, below baseline 0.76. Chest X Ray was concerning for pulmonary edema and dense left basilar opacity. She was given Lasix 80 mg IV x 1 in the Emergency Department. She was admitted to Hospital Medicine for further evaluation, management, and coordination of care.

## 2023-01-30 NOTE — ASSESSMENT & PLAN NOTE
Patient's FSGs are controlled on current medication regimen.  Last A1c reviewed-   Lab Results   Component Value Date    HGBA1C 4.6 01/04/2023     Most recent fingerstick glucose reviewed- No results for input(s): POCTGLUCOSE in the last 24 hours.  Current correctional scale  Low  Maintain anti-hyperglycemic dose as follows-   Antihyperglycemics (From admission, onward)    Start     Stop Route Frequency Ordered    01/30/23 1343  insulin aspart U-100 pen 0-5 Units         -- SubQ Before meals & nightly PRN 01/30/23 1244        Plan:  - Goal 140-180  - Hold Oral hypoglycemics while patient is in the hospital  - LDSSI, adjust as needed  - Cardiac-diabetic diet if not NPO  - POCT accuchecks

## 2023-01-30 NOTE — Clinical Note
Diagnosis: SOB (shortness of breath) [418691]   Admitting Provider:: [facility] YIP [[id number]]   Future Attending Provider: [facility] YIP [[id number]]   Reason for IP Medical Treatment  (Clinical interventions that can only be accomplished in the IP setting? ) :: CHF exacerbation   Estimated Length of Stay:: 2 midnights   I certify that Inpatient services for greater than or equal to 2 midnights are medically necessary:: No   Plans for Post-Acute care--if anticipated (pick the single best option):: A. No post acute care anticipated at this time

## 2023-01-30 NOTE — ASSESSMENT & PLAN NOTE
Echo (11/2022) showed EF 10%, combined  Home meds: Metoprolol, Entresto  Dry weight: 89.8  BNP 2470  S/p AICD    Chest X Ray (1/30/23): Monitoring leads overlie the chest.  Left subclavian approach AICD, as before. Enlarged cardiac silhouette and prominence and indistinctness of central pulmonary vasculature. Prominent markings could reflect pulmonary edema, noting that infectious or inflammatory etiology not excluded. Dense retrocardiac left basilar opacity could relate to atelectasis, aspiration or pneumonia. Small pleural effusions not excluded. No definite pneumothorax.     Plan:  Decompensated  - HTS consulted, following, appreciated  - Lasix 80 mg IV BID  - Continue to Metoprolol, Entresto  - Daily weights (standing if tolerated)  - Strict I/Os  - Fluid restriction at 1500mL  - Cardiac diet  - Telemetry, continuous

## 2023-01-30 NOTE — TELEPHONE ENCOUNTER
----- Message from Cheryl Peterson sent at 1/30/2023 10:32 AM CST -----  Regarding: Updated  Pt needs to cancel today's appts. They are in the emergency room.      Hafsa @ [phone]    _____________________  Attempted to return call to pt.   No answer and voicemail is full.  [place]ed [place] [facility] [place]  Today's eval appts cancelled.

## 2023-01-30 NOTE — ED TRIAGE NOTES
Pt complains of shortness of breath since 07:00 am this morning  when she was going to her doctor appointments

## 2023-01-30 NOTE — ED NOTES
Pt identifiers Hafsa Hawley were checked and are correct  LOC: The patient is awake, alert, aware of environment with an appropriate affect. Oriented x4, speaking appropriately  APPEARANCE: Pt resting comfortably, in no acute distress, pt is clean and well groomed, clothing properly fastened  SKIN: Skin warm, dry and intact, normal skin turgor, moist mucus membranes  RESPIRATORY: Airway is open and patent, respirations are spontaneous, even and unlabored, normal effort and rate  Wheezing auscultated to all lung fields   CARDIAC: Normal rate and rhythm, no peripheral edema noted, capillary refill < 3 seconds, bilateral radial pulses 2+  ABDOMEN: Soft, nontender, nondistended. Bowel sounds present to all four quad of abd on auscultation  NEUROLOGIC: PERRL, facial expression is symmetrical, patient moving all extremities spontaneously, normal sensation in all extremities when touched with a finger.  Follows all commands appropriately  MUSCULOSKELETAL: No obvious deformities.

## 2023-01-30 NOTE — ASSESSMENT & PLAN NOTE
Patient on Entresto, Metoprolol, Isosorbide, Hydralazine, Lasix at home    Plan:  - Continue home medications

## 2023-01-30 NOTE — ASSESSMENT & PLAN NOTE
Troponin 0.623 on admission  Baseline troponin ~ 0.76    Plan:  Likely demand ischemia in setting of ADHF  - Repeat troponin, trend  - Consider Cardiology consultation if elevating  - Continue to monitor  - EKG PRN

## 2023-01-30 NOTE — SUBJECTIVE & OBJECTIVE
Past Medical History:   Diagnosis Date    Anemia     Anticoagulant long-term use     Arthritis     Atrial fibrillation     CKD (chronic kidney disease), stage IV 05/08/2018    Congestive heart failure     COPD (chronic obstructive pulmonary disease)     Deep vein thrombosis     Depression     elevated bilirubin d/t Gilbert's syndrome     confirmed by [facility] genetic testing, evaluated by hepatology    Encounter for blood transfusion     GERD (gastroesophageal reflux disease)     Hypertension     Hypertensive cardiovascular-renal disease, stage 1-4 or unspecified chronic kidney disease, with heart failure 03/04/2022    Pheochromocytoma, malignant     Restrictive lung disease 04/26/2022    Right kidney mass     Sleep apnea     Thalassemia trait, alpha     Thyroid disease     Type 2 diabetes mellitus with hyperglycemia, without long-term current use of insulin 08/13/2020       Past Surgical History:   Procedure Laterality Date    APPENDECTOMY      BONE MARROW BIOPSY      CARDIAC DEFIBRILLATOR PLACEMENT Left 12/2016    CARDIAC ELECTROPHYSIOLOGY MAPPING AND ABLATION      CARDIAC ELECTROPHYSIOLOGY MAPPING AND ABLATION      COLONOSCOPY N/A 5/6/2022    Procedure: COLONOSCOPY;  Surgeon: Arely Betancourt MD;  Location: [facility] ([address] [place] [address]);  Service: Endoscopy;  Laterality: N/A;  heart transplant candidate/ EF 25% - 2nd floor/ defib - Biotronik - ERW  Eliquis - per Dr. Cortez with [facility], Pt ok to hold Eliquis x 2 days prior-see media tab-outside correspondence dated 12/30/21  - ERW  verbal instructions/portal instructions/email instructions - s    EYE SURGERY      due to running tears    FRACTURE SURGERY Left     hand 5th digit    HYSTERECTOMY      KNEE SURGERY Left 2016    hematoma    LIVER BIOPSY  10/24/2018    Minimal steatosis, predominantly macrovesicular, 1%, Minimal nonspecific portal inflammation, no fibrosis. No findings on biopsy to explain elevated bilirubin levels. Could be d/t Gilbert's =?- hemolysis    RIGHT  "HEART CATHETERIZATION Right 12/07/2021    Procedure: INSERTION, CATHETER, RIGHT HEART;  Surgeon: Irving Cardenas MD;  Location: [facility] CATH LAB;  Service: Cardiology;  Laterality: Right;    RIGHT HEART CATHETERIZATION Right 12/19/2022    Procedure: INSERTION, CATHETER, RIGHT HEART;  Surgeon: Burke Camilo MD;  Location: [facility] CATH LAB;  Service: Cardiology;  Laterality: Right;    TRANSJUGULAR BIOPSY OF LIVER N/A 10/24/2018    Procedure: BIOPSY, LIVER, TRANSJUGULAR APPROACH;  [facility];  Location: [facility] OR [facility] FLR;  Service: Radiology;  Laterality: N/A;       Review of patient's allergies indicates:   Allergen Reactions    Penicillins Hives and Other (See Comments)    Iodinated contrast media Nausea And Vomiting    Oxycodone-acetaminophen Other (See Comments)     Nausea, Dizziness, Anxiety.  "I don't like how it makes me feel."   Given Hydromorphone 0.5mg IVP  Without problems.  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 Metabolism of other drugs not listed here may also be impacted by UGT1A1 enzyme activity.       Tramadol Nausea And Vomiting and Other (See Comments)     Other reaction(s): Other (See Comments)    Valsartan Other (See Comments)       Current Facility-Administered Medications on File Prior to Encounter   Medication    0.9%  NaCl infusion    vancomycin in dextrose 5 % 1 gram/250 mL IVPB 1,000 mg     Current Outpatient Medications " on File Prior to Encounter   Medication Sig    albuterol-ipratropium (DUO-NEB) 2.5 mg-0.5 mg/3 mL nebulizer solution Take 3 mLs by nebulization 2 (two) times a day.    allopurinoL (ZYLOPRIM) 100 MG tablet Take 1 tablet (100 mg total) by mouth once daily.    apixaban (ELIQUIS) 5 mg Tab Take 1 tablet (5 mg total) by mouth 2 (two) times daily.    atorvastatin (LIPITOR) 40 MG tablet Take 1 tablet (40 mg total) by mouth every evening.    b complex vitamins tablet Take 1 tablet by mouth once daily.    blood sugar diagnostic (ACCU-CHEK GUIDE TEST STRIPS) Strp 1 strip by Other route 3 (three) times daily.    cetirizine (ZYRTEC) 10 MG tablet Take 10 mg by mouth daily as needed for Allergies.    diclofenac sodium (VOLTAREN) 1 % Gel APPLY 2 G TOPICALLY 3 (THREE) TIMES DAILY AS NEEDED (PAIN).    DULoxetine (CYMBALTA) 30 MG capsule Take 1 capsule orally once a day.    empagliflozin (JARDIANCE) 25 mg tablet Take 25 mg by mouth once daily.    ergocalciferol (ERGOCALCIFEROL) 50,000 unit Cap Take 1 capsule (50,000 Units total) by mouth every 7 days. (Patient taking differently: Take 50,000 Units by mouth every Saturday.)    ferrous sulfate 325 (65 FE) MG EC tablet Take 1 tablet (325 mg total) by mouth once daily.    fluticasone propionate (FLONASE) 50 mcg/actuation nasal spray 2 sprays by Each Nostril route daily as needed for Rhinitis.     furosemide (LASIX) 80 MG tablet Take 1 tablet (80 mg total) by mouth 2 (two) times daily.    glimepiride (AMARYL) 2 MG tablet TAKE 1 TABLET BY MOUTH EVERY DAY WITH BREAKFAST    hydrALAZINE (APRESOLINE) 100 MG tablet Take 1 tablet (100 mg total) by mouth 3 (three) times daily.    isosorbide dinitrate (ISORDIL) 30 MG Tab Take 1 tablet (30 mg total) by mouth 3 (three) times daily.    lancets (ACCU-CHEK SOFTCLIX LANCETS) Misc 1 Device by Misc.(Non-Drug; Combo Route) route 3 (three) times daily.    LIDOcaine (LIDODERM) 5 % Place 1 patch onto the skin once daily. Remove & Discard patch within 12 hours  or as directed by MD    metoprolol succinate (TOPROL-XL) 50 MG 24 hr tablet Take 1 tablet (50 mg total) by mouth once daily.    mometasone-formoterol (DULERA) 200-5 mcg/actuation inhaler Inhale 2 puffs into the lungs 2 (two) times daily. Controller    montelukast (SINGULAIR) 10 mg tablet Take 1 tablet (10 mg total) by mouth once daily.    pantoprazole (PROTONIX) 40 MG tablet TAKE 1 TABLET BY MOUTH DAILY IN THE MORNING    potassium chloride (MICRO-K) 10 MEQ CpSR Take 1 capsule (10 mEq total) by mouth once daily.    rOPINIRole (REQUIP) 4 MG tablet Take 1 tablet (4 mg total) by mouth once daily. Pt taking 4mg daily    sacubitriL-valsartan (ENTRESTO)  mg per tablet Take 1 tablet by mouth 2 (two) times daily.    scopolamine (TRANSDERM-SCOP) 1.3-1.5 mg (1 mg over 3 days) Place 1 patch onto the skin every 72 hours as needed (nausea).    SPIRIVA WITH HANDIHALER 18 mcg inhalation capsule INHALE 1 CAPSULE INTO THE LUNGS ONCE DAILY.    VENTOLIN HFA 90 mcg/actuation inhaler Inhale 2 puffs into the lungs every 6 (six) hours as needed for Shortness of Breath or Wheezing.    acetaminophen (TYLENOL) 500 MG tablet Take 1,000 mg by mouth 2 (two) times daily as needed for Pain.    ALPRAZolam (XANAX) 2 MG Tab Take 0.25-2 mg by mouth 2 (two) times daily as needed (anxiety).    amiodarone (PACERONE) 200 MG Tab Take 1 tablet (200 mg total) by mouth once daily. (Patient not taking: Reported on 1/20/2023)    mv,Ca,min/iron/FA/guarana/caff (ONE-A-DAY WOMEN'S ACTIVE ORAL) Take 1 tablet by mouth once daily.    NITROSTAT 0.4 mg SL tablet Take 2.5 tablets (1 mg total) by mouth every 5 (five) minutes as needed for Chest pain. No more than 3 tablets in 15 minutes.    ondansetron (ZOFRAN-ODT) 4 MG TbDL Take 1 tablet (4 mg total) by mouth 2 (two) times daily.    promethazine-codeine 6.25-10 mg/5 ml (PHENERGAN WITH CODEINE) 6.25-10 mg/5 mL syrup Take 5 ml by mouth every 4-6 hours as needed.     Family History       Problem Relation (Age of  Onset)    Cancer Mother    Cirrhosis Brother    Diabetes Brother    Heart attack Father    Heart disease Father, Sister, Brother, Sister, Brother    Hypertension Father, Brother          Tobacco Use    Smoking status: Never    Smokeless tobacco: Never   Substance and Sexual Activity    Alcohol use: Yes     Comment: up to 1 yr ago drank 2-3 drinks on occasion but sporadic    Drug use: No    Sexual activity: Yes     Partners: Male     Review of Systems   Constitutional:  Positive for activity change, chills and fatigue. Negative for appetite change and fever.   HENT:  Positive for sore throat. Negative for congestion, ear pain, postnasal drip, rhinorrhea and sinus pressure.    Eyes:  Negative for photophobia, pain and visual disturbance.   Respiratory:  Positive for cough, chest tightness and shortness of breath. Negative for wheezing.    Cardiovascular:  Negative for chest pain, palpitations and leg swelling.   Gastrointestinal:  Positive for nausea and vomiting. Negative for abdominal pain, constipation and diarrhea.   Endocrine: Negative for cold intolerance and heat intolerance.   Genitourinary:  Negative for dysuria, flank pain, frequency and hematuria.   Musculoskeletal:  Negative for arthralgias and myalgias.   Skin:  Negative for pallor and rash.   Allergic/Immunologic: Negative for immunocompromised state.   Neurological:  Positive for headaches. Negative for dizziness, syncope, weakness, light-headedness and numbness.   Hematological:  Does not bruise/bleed easily.   Psychiatric/Behavioral:  Negative for agitation, confusion and dysphoric mood. The patient is not nervous/anxious.    Objective:     Vital Signs (Most Recent):  Temp: 98.7 °F (37.1 °C) (01/30/23 1408)  Pulse: 83 (01/30/23 1408)  Resp: 20 (01/30/23 1008)  BP: (!) 164/97 (01/30/23 1408)  SpO2: (!) 92 % (01/30/23 1408)   Vital Signs (24h Range):  Temp:  [98.5 °F (36.9 °C)-98.9 °F (37.2 °C)] 98.7 °F (37.1 °C)  Pulse:  [] 83  Resp:  [20-28]  20  SpO2:  [85 %-92 %] 92 %  BP: (151-164)/(95-97) 164/97     Weight: 89.8 kg (198 lb)  Body mass index is 31.96 kg/m².    Physical Exam  Vitals and nursing note reviewed.   Constitutional:       General: She is awake. She is not in acute distress.     Appearance: She is obese. She is not ill-appearing, toxic-appearing or diaphoretic.   HENT:      Head: Normocephalic and atraumatic.      Nose: Nose normal. No congestion or rhinorrhea.      Mouth/Throat:      Mouth: Mucous membranes are moist.      Pharynx: No oropharyngeal exudate or posterior oropharyngeal erythema.   Eyes:      General: No scleral icterus.        Right eye: No discharge.         Left eye: No discharge.      Extraocular Movements: Extraocular movements intact.      Pupils: Pupils are equal, round, and reactive to light.   Neck:      Vascular: No JVD.   Cardiovascular:      Rate and Rhythm: Normal rate and regular rhythm.      Pulses: Normal pulses.      Heart sounds: No murmur heard.    No friction rub.   Pulmonary:      Effort: Pulmonary effort is normal. No respiratory distress.      Breath sounds: Rales present. No wheezing or rhonchi.   Chest:      Chest wall: No tenderness.   Abdominal:      General: Bowel sounds are normal. There is no distension.      Palpations: Abdomen is soft.      Tenderness: There is no abdominal tenderness. There is no guarding or rebound.   Musculoskeletal:         General: No swelling or tenderness.      Cervical back: Normal range of motion. No rigidity.      Right lower leg: No edema.      Left lower leg: No edema.   Skin:     General: Skin is warm and dry.      Capillary Refill: Capillary refill takes less than 2 seconds.      Findings: No erythema or rash.   Neurological:      General: No focal deficit present.      Mental Status: She is alert and oriented to person, place, and time.      Sensory: No sensory deficit.      Motor: No weakness.   Psychiatric:         Mood and Affect: Mood normal.         Behavior:  Behavior normal. Behavior is cooperative.         Thought Content: Thought content normal.         CRANIAL NERVES     CN III, IV, VI   Pupils are equal, round, and reactive to light.     Significant Labs: All pertinent labs within the past 24 hours have been reviewed.    Significant Imaging: I have reviewed all pertinent imaging results/findings within the past 24 hours.

## 2023-01-31 ENCOUNTER — DOCUMENTATION ONLY (OUTPATIENT)
Dept: TRANSPLANT | Facility: CLINIC | Age: 58
End: 2023-01-31
Payer: MEDICAID

## 2023-01-31 LAB
ALBUMIN SERPL BCP-MCNC: 3.4 G/DL (ref 3.5–5.2)
ALP SERPL-CCNC: 43 U/L (ref 55–135)
ALT SERPL W/O P-5'-P-CCNC: 17 U/L (ref 10–44)
ANION GAP SERPL CALC-SCNC: 10 MMOL/L (ref 8–16)
AST SERPL-CCNC: 19 U/L (ref 10–40)
BASOPHILS # BLD AUTO: 0.02 K/UL (ref 0–0.2)
BASOPHILS NFR BLD: 0.5 % (ref 0–1.9)
BILIRUB SERPL-MCNC: 1.8 MG/DL (ref 0.1–1)
BUN SERPL-MCNC: 43 MG/DL (ref 6–20)
CALCIUM SERPL-MCNC: 9 MG/DL (ref 8.7–10.5)
CHLORIDE SERPL-SCNC: 107 MMOL/L (ref 95–110)
CO2 SERPL-SCNC: 23 MMOL/L (ref 23–29)
CREAT SERPL-MCNC: 2.1 MG/DL (ref 0.5–1.4)
DIFFERENTIAL METHOD: ABNORMAL
EOSINOPHIL # BLD AUTO: 0 K/UL (ref 0–0.5)
EOSINOPHIL NFR BLD: 0.5 % (ref 0–8)
ERYTHROCYTE [DISTWIDTH] IN BLOOD BY AUTOMATED COUNT: 28.5 % (ref 11.5–14.5)
EST. GFR  (NO RACE VARIABLE): 27 ML/MIN/1.73 M^2
GLUCOSE SERPL-MCNC: 62 MG/DL (ref 70–110)
HCT VFR BLD AUTO: 24.9 % (ref 37–48.5)
HGB BLD-MCNC: 7.2 G/DL (ref 12–16)
IMM GRANULOCYTES # BLD AUTO: 0.06 K/UL (ref 0–0.04)
IMM GRANULOCYTES NFR BLD AUTO: 1.6 % (ref 0–0.5)
LYMPHOCYTES # BLD AUTO: 0.7 K/UL (ref 1–4.8)
LYMPHOCYTES NFR BLD: 17.8 % (ref 18–48)
MAGNESIUM SERPL-MCNC: 2 MG/DL (ref 1.6–2.6)
MCH RBC QN AUTO: 17.5 PG (ref 27–31)
MCHC RBC AUTO-ENTMCNC: 28.9 G/DL (ref 32–36)
MCV RBC AUTO: 61 FL (ref 82–98)
MONOCYTES # BLD AUTO: 0.3 K/UL (ref 0.3–1)
MONOCYTES NFR BLD: 8.8 % (ref 4–15)
NEUTROPHILS # BLD AUTO: 2.7 K/UL (ref 1.8–7.7)
NEUTROPHILS NFR BLD: 70.8 % (ref 38–73)
NRBC BLD-RTO: 13 /100 WBC
PHOSPHATE SERPL-MCNC: 5.4 MG/DL (ref 2.7–4.5)
PLATELET # BLD AUTO: 132 K/UL (ref 150–450)
PMV BLD AUTO: ABNORMAL FL (ref 9.2–12.9)
POTASSIUM SERPL-SCNC: 3.8 MMOL/L (ref 3.5–5.1)
PROT SERPL-MCNC: 5.8 G/DL (ref 6–8.4)
RBC # BLD AUTO: 4.11 M/UL (ref 4–5.4)
SODIUM SERPL-SCNC: 140 MMOL/L (ref 136–145)
WBC # BLD AUTO: 3.87 K/UL (ref 3.9–12.7)

## 2023-01-31 PROCEDURE — 80053 COMPREHEN METABOLIC PANEL: CPT | Mod: NTX | Performed by: STUDENT IN AN ORGANIZED HEALTH CARE EDUCATION/TRAINING PROGRAM

## 2023-01-31 PROCEDURE — 63600175 PHARM REV CODE 636 W HCPCS: Mod: NTX | Performed by: STUDENT IN AN ORGANIZED HEALTH CARE EDUCATION/TRAINING PROGRAM

## 2023-01-31 PROCEDURE — 99233 PR SUBSEQUENT HOSPITAL CARE,LEVL III: ICD-10-PCS | Mod: NTX,,, | Performed by: STUDENT IN AN ORGANIZED HEALTH CARE EDUCATION/TRAINING PROGRAM

## 2023-01-31 PROCEDURE — 25000003 PHARM REV CODE 250: Mod: NTX | Performed by: STUDENT IN AN ORGANIZED HEALTH CARE EDUCATION/TRAINING PROGRAM

## 2023-01-31 PROCEDURE — 99900035 HC TECH TIME PER 15 MIN (STAT): Mod: NTX

## 2023-01-31 PROCEDURE — 94640 AIRWAY INHALATION TREATMENT: CPT | Mod: NTX

## 2023-01-31 PROCEDURE — 27000190 HC CPAP FULL FACE MASK W/VALVE: Mod: NTX

## 2023-01-31 PROCEDURE — 99233 SBSQ HOSP IP/OBS HIGH 50: CPT | Mod: NTX,,, | Performed by: STUDENT IN AN ORGANIZED HEALTH CARE EDUCATION/TRAINING PROGRAM

## 2023-01-31 PROCEDURE — 94761 N-INVAS EAR/PLS OXIMETRY MLT: CPT | Mod: NTX

## 2023-01-31 PROCEDURE — 25000242 PHARM REV CODE 250 ALT 637 W/ HCPCS: Mod: NTX

## 2023-01-31 PROCEDURE — 25000003 PHARM REV CODE 250: Mod: NTX

## 2023-01-31 PROCEDURE — 94660 CPAP INITIATION&MGMT: CPT | Mod: NTX

## 2023-01-31 PROCEDURE — 84100 ASSAY OF PHOSPHORUS: CPT | Mod: NTX | Performed by: STUDENT IN AN ORGANIZED HEALTH CARE EDUCATION/TRAINING PROGRAM

## 2023-01-31 PROCEDURE — 11000001 HC ACUTE MED/SURG PRIVATE ROOM: Mod: NTX

## 2023-01-31 PROCEDURE — 83735 ASSAY OF MAGNESIUM: CPT | Mod: NTX | Performed by: STUDENT IN AN ORGANIZED HEALTH CARE EDUCATION/TRAINING PROGRAM

## 2023-01-31 PROCEDURE — 85025 COMPLETE CBC W/AUTO DIFF WBC: CPT | Mod: NTX | Performed by: STUDENT IN AN ORGANIZED HEALTH CARE EDUCATION/TRAINING PROGRAM

## 2023-01-31 PROCEDURE — 27000221 HC OXYGEN, UP TO 24 HOURS: Mod: NTX

## 2023-01-31 PROCEDURE — 36415 COLL VENOUS BLD VENIPUNCTURE: CPT | Mod: NTX | Performed by: STUDENT IN AN ORGANIZED HEALTH CARE EDUCATION/TRAINING PROGRAM

## 2023-01-31 RX ORDER — POTASSIUM CHLORIDE 20 MEQ/1
20 TABLET, EXTENDED RELEASE ORAL ONCE
Status: COMPLETED | OUTPATIENT
Start: 2023-01-31 | End: 2023-01-31

## 2023-01-31 RX ORDER — METOPROLOL SUCCINATE 100 MG/1
100 TABLET, EXTENDED RELEASE ORAL DAILY
Status: DISCONTINUED | OUTPATIENT
Start: 2023-02-01 | End: 2023-02-03 | Stop reason: HOSPADM

## 2023-01-31 RX ORDER — LOPERAMIDE HYDROCHLORIDE 2 MG/1
2 CAPSULE ORAL 4 TIMES DAILY PRN
Status: DISCONTINUED | OUTPATIENT
Start: 2023-01-31 | End: 2023-02-03 | Stop reason: HOSPADM

## 2023-01-31 RX ADMIN — POTASSIUM CHLORIDE 20 MEQ: 1500 TABLET, EXTENDED RELEASE ORAL at 03:01

## 2023-01-31 RX ADMIN — ISOSORBIDE DINITRATE 30 MG: 30 TABLET ORAL at 09:01

## 2023-01-31 RX ADMIN — METOPROLOL SUCCINATE 50 MG: 50 TABLET, EXTENDED RELEASE ORAL at 09:01

## 2023-01-31 RX ADMIN — IPRATROPIUM BROMIDE AND ALBUTEROL SULFATE 3 ML: 2.5; .5 SOLUTION RESPIRATORY (INHALATION) at 08:01

## 2023-01-31 RX ADMIN — IPRATROPIUM BROMIDE AND ALBUTEROL SULFATE 3 ML: 2.5; .5 SOLUTION RESPIRATORY (INHALATION) at 02:01

## 2023-01-31 RX ADMIN — APIXABAN 5 MG: 5 TABLET, FILM COATED ORAL at 09:01

## 2023-01-31 RX ADMIN — ATORVASTATIN CALCIUM 40 MG: 40 TABLET, FILM COATED ORAL at 09:01

## 2023-01-31 RX ADMIN — GUAIFENESIN AND DEXTROMETHORPHAN 5 ML: 100; 10 SYRUP ORAL at 09:01

## 2023-01-31 RX ADMIN — SACUBITRIL AND VALSARTAN 1 TABLET: 97; 103 TABLET, FILM COATED ORAL at 09:01

## 2023-01-31 RX ADMIN — ROPINIROLE 4 MG: 2 TABLET, FILM COATED ORAL at 09:01

## 2023-01-31 RX ADMIN — ISOSORBIDE DINITRATE 30 MG: 30 TABLET ORAL at 03:01

## 2023-01-31 RX ADMIN — FUROSEMIDE 80 MG: 10 INJECTION, SOLUTION INTRAMUSCULAR; INTRAVENOUS at 06:01

## 2023-01-31 RX ADMIN — LOPERAMIDE HYDROCHLORIDE 2 MG: 2 CAPSULE ORAL at 12:01

## 2023-01-31 NOTE — NURSING
Pt had incontinent episode of diarrhea [place] [facility] [place] returned call and made aware.

## 2023-01-31 NOTE — PROGRESS NOTES
[facility] ([facility]) Team notified of pt referral via [facility] (automated inbasket notification) .    Patient screened today January 31, 2023 by provider and RN for enrollment to program.      Pt was deemed not a candidate for enrollment at this time related to patient [place] [facility]. [facility] enrolled to [facility] [place]     Pt will require additional follow up planning per primary team.     If pt status, diagnosis, or treatment plan changes , please place AMB referral to [facility] ([id number]) for HFTCC enrollment re-evalution.

## 2023-01-31 NOTE — ASSESSMENT & PLAN NOTE
Patient's FSGs are controlled on current medication regimen.  Last A1c reviewed-   Lab Results   Component Value Date    HGBA1C 4.6 01/30/2023     Most recent fingerstick glucose reviewed- No results for input(s): POCTGLUCOSE in the last 24 hours.  Current correctional scale  Low  Maintain anti-hyperglycemic dose as follows-   Antihyperglycemics (From admission, onward)    Start     Stop Route Frequency Ordered    01/30/23 1343  insulin aspart U-100 pen 0-5 Units         -- SubQ Before meals & nightly PRN 01/30/23 1244        Plan:  - Goal 140-180  - Hold Oral hypoglycemics while patient is in the hospital  - LDSSI, adjust as needed  - Cardiac-diabetic diet if not NPO  - POCT accuchecks

## 2023-01-31 NOTE — CONSULTS
Food & Nutrition  Education    Diet Education: Fluid and Salt Restriction  Time Spent: 15 minutes  Learners: Patient      Nutrition Education provided with handouts: Heart Failure Nutrition Therapy      Comments: RD spoke with patient regarding heart failure nutrition therapy. We discussed limiting sodium in her diet to control buildup of fluids around the heart, stomach, lungs, and legs. Limiting fluid in the diet also helps because too much fluid can cause SOB, poor appetite, and weight gain from swelling or edema, which makes the heart work harder. RD explained the importance of reading the Nutrition Facts label, which will help determine the sodium content in 1 serving of a food. Select foods with <140 mg or less per serving. Avoid processed foods. If/when dining out use precaution restaurant meals can be very high in sodium. Lastly RD discussed weight monitoring to determine sudden weight changes. Patient agrees to review the material on her own, but no questions/concerns currently. Patient understands the information provided, agrees to comply with recommendations.       All questions and concerns answered. Dietitian's contact information provided.       Follow-Up: Yes    Please Re-consult as needed        Thanks!    Sulaiman Smith Registration Eligible, Provisional LDN

## 2023-01-31 NOTE — PLAN OF CARE
Problem: Adult Inpatient Plan of Care  Goal: Plan of Care Review  Outcome: Ongoing, Progressing  Goal: Patient-Specific Goal (Individualized)  Outcome: Ongoing, Progressing  Goal: Absence of Hospital-Acquired Illness or Injury  Outcome: Ongoing, Progressing  Goal: Optimal Comfort and Wellbeing  Outcome: Ongoing, Progressing  Goal: Readiness for Transition of Care  Outcome: Ongoing, Progressing     Problem: Adult Inpatient Plan of Care  Goal: Plan of Care Review  Outcome: Ongoing, Progressing     Problem: Adult Inpatient Plan of Care  Goal: Optimal Comfort and Wellbeing  Outcome: Ongoing, Progressing     Problem: Adult Inpatient Plan of Care  Goal: Readiness for Transition of Care  Outcome: Ongoing, Progressing

## 2023-01-31 NOTE — ASSESSMENT & PLAN NOTE
Patient with Persistent (7 days or more) atrial fibrillation which is controlled currently with Beta Blocker. Patient is currently in sinus rhythm.[id number] Score: 3. HASBLED Score: . Anticoagulation indicated. Anticoagulation done with Eliquis.    Current rhythm: Sinus  Home meds: Apixaban, Metoprolol  [id number] 3  HAS-BLED     Plan:  - Control with Metoprolol  - Anticoagulation with Apixaban  - Telemetry, continuous  - Maintain K > 4, Mg > 2, Ca wnl

## 2023-01-31 NOTE — SUBJECTIVE & OBJECTIVE
Interval History: Patient reported to have to had a few runs of asymptomatic v tach and an episode of diarrhea overnight. She reports feeling a little better today as compared to yesterday. She was able to transfer from bed to couch without significant SOB but did note some weakness. States she removed her oxygen to sit on couch but wants to put it back on soon. Reports poor appetite and a chronic dry cough.    Review of Systems   Constitutional:  Positive for activity change, chills and fatigue. Negative for appetite change and fever.   HENT:  Positive for sore throat. Negative for congestion, ear pain, postnasal drip, rhinorrhea and sinus pressure.    Eyes:  Negative for photophobia, pain and visual disturbance.   Respiratory:  Positive for cough, chest tightness and shortness of breath. Negative for wheezing.    Cardiovascular:  Negative for chest pain, palpitations and leg swelling.   Gastrointestinal:  Positive for diarrhea and nausea. Negative for abdominal pain, constipation and vomiting.   Endocrine: Negative for cold intolerance and heat intolerance.   Genitourinary:  Negative for dysuria, flank pain, frequency and hematuria.   Musculoskeletal:  Negative for arthralgias and myalgias.   Skin:  Negative for pallor and rash.   Allergic/Immunologic: Negative for immunocompromised state.   Neurological:  Positive for weakness and headaches. Negative for dizziness, syncope, light-headedness and numbness.   Hematological:  Does not bruise/bleed easily.   Psychiatric/Behavioral:  Negative for agitation, confusion and dysphoric mood. The patient is not nervous/anxious.    Objective:     Vital Signs (Most Recent):  Temp: 97 °F (36.1 °C) (01/31/23 0744)  Pulse: 63 (01/31/23 0801)  Resp: 20 (01/31/23 0801)  BP: 131/77 (01/31/23 0744)  SpO2: 97 % (01/31/23 0801) Vital Signs (24h Range):  Temp:  [97 °F (36.1 °C)-98.7 °F (37.1 °C)] 97 °F (36.1 °C)  Pulse:  [59-84] 63  Resp:  [18-20] 20  SpO2:  [85 %-98 %] 97 %  BP:  (120-165)/() 131/77     Weight: 89.8 kg (198 lb)  Body mass index is 31.96 kg/m².    Intake/Output Summary (Last 24 hours) at 1/31/2023 0858  Last data filed at 1/30/2023 1725  Gross per 24 hour   Intake --   Output 550 ml   Net -550 ml      Physical Exam  Vitals and nursing note reviewed.   Constitutional:       General: She is awake. She is not in acute distress.     Appearance: She is obese. She is not ill-appearing, toxic-appearing or diaphoretic.   HENT:      Head: Normocephalic and atraumatic.      Nose: Nose normal. No congestion or rhinorrhea.      Mouth/Throat:      Mouth: Mucous membranes are moist.      Pharynx: No oropharyngeal exudate or posterior oropharyngeal erythema.   Eyes:      General: No scleral icterus.        Right eye: No discharge.         Left eye: No discharge.      Extraocular Movements: Extraocular movements intact.      Pupils: Pupils are equal, round, and reactive to light.   Neck:      Vascular: No JVD.   Cardiovascular:      Rate and Rhythm: Normal rate and regular rhythm.      Pulses: Normal pulses.      Heart sounds: No murmur heard.    No friction rub.   Pulmonary:      Effort: Pulmonary effort is normal. No respiratory distress.      Breath sounds: No wheezing, rhonchi or rales.   Chest:      Chest wall: No tenderness.   Abdominal:      General: Bowel sounds are normal. There is no distension.      Palpations: Abdomen is soft.      Tenderness: There is no abdominal tenderness. There is no guarding or rebound.   Musculoskeletal:         General: No swelling or tenderness.      Cervical back: Normal range of motion. No rigidity.      Right lower leg: No edema.      Left lower leg: No edema.   Skin:     General: Skin is warm and dry.      Capillary Refill: Capillary refill takes less than 2 seconds.      Findings: No erythema or rash.   Neurological:      General: No focal deficit present.      Mental Status: She is alert and oriented to person, place, and time.      Sensory:  No sensory deficit.      Motor: No weakness.   Psychiatric:         Mood and Affect: Mood normal.         Behavior: Behavior normal. Behavior is cooperative.         Thought Content: Thought content normal.       Significant Labs: All pertinent labs within the past 24 hours have been reviewed.    Significant Imaging: I have reviewed all pertinent imaging results/findings within the past 24 hours.

## 2023-01-31 NOTE — PROGRESS NOTES
Pt became ill on her way to her FU appt in [facility] clinic and is currently admitted on Hospital Medicine service for N/V, dyspnea, pulm edema, ADHF, Acute on chronic resp failure.

## 2023-02-01 LAB
ALBUMIN SERPL BCP-MCNC: 3.3 G/DL (ref 3.5–5.2)
ALP SERPL-CCNC: 46 U/L (ref 55–135)
ALT SERPL W/O P-5'-P-CCNC: 16 U/L (ref 10–44)
ANION GAP SERPL CALC-SCNC: 11 MMOL/L (ref 8–16)
ANISOCYTOSIS BLD QL SMEAR: SLIGHT
AST SERPL-CCNC: 15 U/L (ref 10–40)
BASOPHILS # BLD AUTO: 0.02 K/UL (ref 0–0.2)
BASOPHILS NFR BLD: 0.4 % (ref 0–1.9)
BILIRUB SERPL-MCNC: 1.4 MG/DL (ref 0.1–1)
BUN SERPL-MCNC: 41 MG/DL (ref 6–20)
BURR CELLS BLD QL SMEAR: ABNORMAL
CALCIUM SERPL-MCNC: 8.7 MG/DL (ref 8.7–10.5)
CHLORIDE SERPL-SCNC: 108 MMOL/L (ref 95–110)
CO2 SERPL-SCNC: 24 MMOL/L (ref 23–29)
CREAT SERPL-MCNC: 2.5 MG/DL (ref 0.5–1.4)
DACRYOCYTES BLD QL SMEAR: ABNORMAL
DIFFERENTIAL METHOD: ABNORMAL
EOSINOPHIL # BLD AUTO: 0.1 K/UL (ref 0–0.5)
EOSINOPHIL NFR BLD: 1.2 % (ref 0–8)
ERYTHROCYTE [DISTWIDTH] IN BLOOD BY AUTOMATED COUNT: 28.5 % (ref 11.5–14.5)
EST. GFR  (NO RACE VARIABLE): 21.9 ML/MIN/1.73 M^2
GLUCOSE SERPL-MCNC: 131 MG/DL (ref 70–110)
HCT VFR BLD AUTO: 24.6 % (ref 37–48.5)
HGB BLD-MCNC: 7.2 G/DL (ref 12–16)
HYPOCHROMIA BLD QL SMEAR: ABNORMAL
IMM GRANULOCYTES # BLD AUTO: 0.08 K/UL (ref 0–0.04)
IMM GRANULOCYTES NFR BLD AUTO: 1.6 % (ref 0–0.5)
LYMPHOCYTES # BLD AUTO: 0.9 K/UL (ref 1–4.8)
LYMPHOCYTES NFR BLD: 16.7 % (ref 18–48)
MAGNESIUM SERPL-MCNC: 2 MG/DL (ref 1.6–2.6)
MCH RBC QN AUTO: 17.6 PG (ref 27–31)
MCHC RBC AUTO-ENTMCNC: 29.3 G/DL (ref 32–36)
MCV RBC AUTO: 60 FL (ref 82–98)
MONOCYTES # BLD AUTO: 0.5 K/UL (ref 0.3–1)
MONOCYTES NFR BLD: 9.4 % (ref 4–15)
NEUTROPHILS # BLD AUTO: 3.6 K/UL (ref 1.8–7.7)
NEUTROPHILS NFR BLD: 70.7 % (ref 38–73)
NRBC BLD-RTO: 8 /100 WBC
OVALOCYTES BLD QL SMEAR: ABNORMAL
PHOSPHATE SERPL-MCNC: 4.2 MG/DL (ref 2.7–4.5)
PLATELET # BLD AUTO: 137 K/UL (ref 150–450)
PLATELET BLD QL SMEAR: ABNORMAL
PMV BLD AUTO: ABNORMAL FL (ref 9.2–12.9)
POCT GLUCOSE: 124 MG/DL (ref 70–110)
POCT GLUCOSE: 150 MG/DL (ref 70–110)
POCT GLUCOSE: 170 MG/DL (ref 70–110)
POIKILOCYTOSIS BLD QL SMEAR: SLIGHT
POTASSIUM SERPL-SCNC: 4.1 MMOL/L (ref 3.5–5.1)
PROT SERPL-MCNC: 5.7 G/DL (ref 6–8.4)
RBC # BLD AUTO: 4.08 M/UL (ref 4–5.4)
SCHISTOCYTES BLD QL SMEAR: ABNORMAL
SODIUM SERPL-SCNC: 143 MMOL/L (ref 136–145)
TARGETS BLD QL SMEAR: ABNORMAL
WBC # BLD AUTO: 5.09 K/UL (ref 3.9–12.7)

## 2023-02-01 PROCEDURE — 36415 COLL VENOUS BLD VENIPUNCTURE: CPT | Mod: NTX | Performed by: STUDENT IN AN ORGANIZED HEALTH CARE EDUCATION/TRAINING PROGRAM

## 2023-02-01 PROCEDURE — 94640 AIRWAY INHALATION TREATMENT: CPT | Mod: NTX

## 2023-02-01 PROCEDURE — 80053 COMPREHEN METABOLIC PANEL: CPT | Mod: NTX | Performed by: STUDENT IN AN ORGANIZED HEALTH CARE EDUCATION/TRAINING PROGRAM

## 2023-02-01 PROCEDURE — 84100 ASSAY OF PHOSPHORUS: CPT | Mod: NTX | Performed by: STUDENT IN AN ORGANIZED HEALTH CARE EDUCATION/TRAINING PROGRAM

## 2023-02-01 PROCEDURE — 99233 PR SUBSEQUENT HOSPITAL CARE,LEVL III: ICD-10-PCS | Mod: NTX,,, | Performed by: STUDENT IN AN ORGANIZED HEALTH CARE EDUCATION/TRAINING PROGRAM

## 2023-02-01 PROCEDURE — 99233 SBSQ HOSP IP/OBS HIGH 50: CPT | Mod: NTX,,, | Performed by: STUDENT IN AN ORGANIZED HEALTH CARE EDUCATION/TRAINING PROGRAM

## 2023-02-01 PROCEDURE — 83735 ASSAY OF MAGNESIUM: CPT | Mod: NTX | Performed by: STUDENT IN AN ORGANIZED HEALTH CARE EDUCATION/TRAINING PROGRAM

## 2023-02-01 PROCEDURE — 99900035 HC TECH TIME PER 15 MIN (STAT): Mod: NTX

## 2023-02-01 PROCEDURE — 11000001 HC ACUTE MED/SURG PRIVATE ROOM: Mod: NTX

## 2023-02-01 PROCEDURE — 94761 N-INVAS EAR/PLS OXIMETRY MLT: CPT | Mod: NTX

## 2023-02-01 PROCEDURE — 25000003 PHARM REV CODE 250: Mod: NTX

## 2023-02-01 PROCEDURE — 25000003 PHARM REV CODE 250: Mod: NTX | Performed by: STUDENT IN AN ORGANIZED HEALTH CARE EDUCATION/TRAINING PROGRAM

## 2023-02-01 PROCEDURE — 25000242 PHARM REV CODE 250 ALT 637 W/ HCPCS: Mod: NTX

## 2023-02-01 PROCEDURE — 27000221 HC OXYGEN, UP TO 24 HOURS: Mod: NTX

## 2023-02-01 PROCEDURE — 25000242 PHARM REV CODE 250 ALT 637 W/ HCPCS: Mod: NTX | Performed by: STUDENT IN AN ORGANIZED HEALTH CARE EDUCATION/TRAINING PROGRAM

## 2023-02-01 PROCEDURE — 85025 COMPLETE CBC W/AUTO DIFF WBC: CPT | Mod: NTX | Performed by: STUDENT IN AN ORGANIZED HEALTH CARE EDUCATION/TRAINING PROGRAM

## 2023-02-01 PROCEDURE — 63600175 PHARM REV CODE 636 W HCPCS: Mod: NTX | Performed by: STUDENT IN AN ORGANIZED HEALTH CARE EDUCATION/TRAINING PROGRAM

## 2023-02-01 PROCEDURE — 94660 CPAP INITIATION&MGMT: CPT | Mod: NTX

## 2023-02-01 RX ORDER — IBUPROFEN 200 MG
16 TABLET ORAL
Status: DISCONTINUED | OUTPATIENT
Start: 2023-02-01 | End: 2023-02-03 | Stop reason: HOSPADM

## 2023-02-01 RX ORDER — FUROSEMIDE 80 MG/1
80 TABLET ORAL 2 TIMES DAILY
Status: DISCONTINUED | OUTPATIENT
Start: 2023-02-01 | End: 2023-02-02

## 2023-02-01 RX ORDER — IBUPROFEN 200 MG
24 TABLET ORAL
Status: DISCONTINUED | OUTPATIENT
Start: 2023-02-01 | End: 2023-02-03 | Stop reason: HOSPADM

## 2023-02-01 RX ORDER — GLUCAGON 1 MG
1 KIT INJECTION
Status: DISCONTINUED | OUTPATIENT
Start: 2023-02-01 | End: 2023-02-03 | Stop reason: HOSPADM

## 2023-02-01 RX ADMIN — FUROSEMIDE 80 MG: 80 TABLET ORAL at 05:02

## 2023-02-01 RX ADMIN — FUROSEMIDE 80 MG: 10 INJECTION, SOLUTION INTRAMUSCULAR; INTRAVENOUS at 05:02

## 2023-02-01 RX ADMIN — IPRATROPIUM BROMIDE AND ALBUTEROL SULFATE 3 ML: 2.5; .5 SOLUTION RESPIRATORY (INHALATION) at 08:02

## 2023-02-01 RX ADMIN — IPRATROPIUM BROMIDE AND ALBUTEROL SULFATE 3 ML: 2.5; .5 SOLUTION RESPIRATORY (INHALATION) at 02:02

## 2023-02-01 RX ADMIN — GUAIFENESIN AND DEXTROMETHORPHAN 5 ML: 100; 10 SYRUP ORAL at 02:02

## 2023-02-01 RX ADMIN — ISOSORBIDE DINITRATE 30 MG: 30 TABLET ORAL at 08:02

## 2023-02-01 RX ADMIN — SACUBITRIL AND VALSARTAN 1 TABLET: 97; 103 TABLET, FILM COATED ORAL at 08:02

## 2023-02-01 RX ADMIN — TIOTROPIUM BROMIDE INHALATION SPRAY 2 PUFF: 3.12 SPRAY, METERED RESPIRATORY (INHALATION) at 08:02

## 2023-02-01 RX ADMIN — APIXABAN 5 MG: 5 TABLET, FILM COATED ORAL at 08:02

## 2023-02-01 RX ADMIN — GUAIFENESIN AND DEXTROMETHORPHAN 5 ML: 100; 10 SYRUP ORAL at 08:02

## 2023-02-01 RX ADMIN — ROPINIROLE 4 MG: 2 TABLET, FILM COATED ORAL at 08:02

## 2023-02-01 RX ADMIN — ISOSORBIDE DINITRATE 30 MG: 30 TABLET ORAL at 02:02

## 2023-02-01 RX ADMIN — ATORVASTATIN CALCIUM 40 MG: 40 TABLET, FILM COATED ORAL at 08:02

## 2023-02-01 RX ADMIN — ONDANSETRON 8 MG: 8 TABLET, ORALLY DISINTEGRATING ORAL at 08:02

## 2023-02-01 RX ADMIN — METOPROLOL SUCCINATE 100 MG: 100 TABLET, EXTENDED RELEASE ORAL at 08:02

## 2023-02-01 NOTE — ASSESSMENT & PLAN NOTE
Echo (11/2022) showed EF 10%, combined  Home meds: Metoprolol, Entresto  Dry weight: 89.8  BNP 2470  S/p AICD    Chest X Ray (1/30/23): Monitoring leads overlie the chest.  Left subclavian approach AICD, as before. Enlarged cardiac silhouette and prominence and indistinctness of central pulmonary vasculature. Prominent markings could reflect pulmonary edema, noting that infectious or inflammatory etiology not excluded. Dense retrocardiac left basilar opacity could relate to atelectasis, aspiration or pneumonia. Small pleural effusions not excluded. No definite pneumothorax.     Plan:  Euvolemic  - HTS consulted, appreciated  - Transitioning to Lasix PO home dose from Lasix 80 mg IV BID  - Continue to Metoprolol, Entresto  - Daily weights (standing if tolerated)  - Strict I/Os  - Fluid restriction at 1500mL  - Cardiac diet  - Telemetry, continuous

## 2023-02-01 NOTE — ASSESSMENT & PLAN NOTE
Patient's FSGs are controlled on current medication regimen.  Last A1c reviewed-   Lab Results   Component Value Date    HGBA1C 4.6 01/30/2023     Most recent fingerstick glucose reviewed-   Recent Labs   Lab 02/01/23  0854 02/01/23  1135   POCTGLUCOSE 170* 124*     Current correctional scale  Low  Maintain anti-hyperglycemic dose as follows-   Antihyperglycemics (From admission, onward)    Start     Stop Route Frequency Ordered    01/30/23 1343  insulin aspart U-100 pen 0-5 Units         -- SubQ Before meals & nightly PRN 01/30/23 1244        Plan:  - Goal 140-180  - Hold Oral hypoglycemics while patient is in the hospital  - LDSSI, adjust as needed  - Cardiac-diabetic diet if not NPO  - POCT accuchecks

## 2023-02-01 NOTE — HPI
53 year old female with hx of NICM, HFrEF (EF: 10%) s/p ICD, Pulmonary Hypertension, DM, HTN, Atrial Fibrillation, who presented to the ED with shortness of breath and volume overload. Patient was admitted to hospital medicine for further management. Patient was diuresed than transitioned to home diuretic of lasix 80 BID on 2/1. Patient was evaluated today and still endorses dyspnea at rest with intermittent chest/neck pressure. However, was found sitting up and comfortable on RA. Of note patient was previously presented to committee on 3/9/2022, patient was declined for OHTx/LVAD due to renal function, lung function and difficulty following up in clinic. Was re-evaluated in clinic on 1/4 but patient was found to be ill in prior visit and was sent to the ED.      Home GDMT: Metoprolol succinate 100 mg daily, Entresto  mg BID, Jardiance, and Hydralazine 100 mg TID, Isordil 30 mg TID    RHC (12/19/2022)  RA: 18/ 13/ 13 RV: 78/ 17/ 18 PA: 78/ 40/ 52 PWP: 27/ 29/ 26 .   Cardiac output was 4.7  by Gillian. Cardiac index is 2.3 L/min/m2.   O2 Sat: PA 46%.   Pulmonary vascular resistance: 5.5 BOLDEN. Systemic vascular resistance: 1333 dynes/cm5.s.     Echocardiogram (11/7/2022)  The left ventricle is severely enlarged with eccentric hypertrophy and severely decreased systolic function. The estimated ejection fraction is 10%.  The right ventricle is not well visualized but appears normal in size with moderately reduced systolic function.  Grade II left ventricular diastolic dysfunction.  Biatrial enlargement.  The estimated PA systolic pressure is 65 mmHg.  Elevated central venous pressure (15 mmHg).  Small posterolateral pericardial effusion.  LVDD 7.27 cm

## 2023-02-01 NOTE — PLAN OF CARE
[facility] - Med Surg  Initial Discharge Assessment       Primary Care Provider: Cristobal Ann MD    Admission Diagnosis: Shortness of breath [R06.02]  SOB (shortness of breath) [R06.02]  Chest pain [R07.9]  Acute on chronic congestive heart failure, unspecified heart failure type [I50.9]    Admission Date: 1/30/2023  Expected Discharge Date: 2/3/2023    Discharge Barriers Identified: Underinsured    Payor: MEDICAID / Plan: [place] [facility] ([facility]) / Product Type: Managed Medicaid /     Extended Emergency Contact Information  Primary Emergency Contact: Jaye Baum  Address: [place] [address]           [place] [address] [facility]  Home Phone: [phone]  Relation: Sister  Secondary Emergency Contact: Jeanie Jaimes  Mobile Phone: [phone]  Relation: Sister    Discharge Plan A: Home with family  Discharge Plan B: Home      [id number] - [place] - [address]  [address]  [place] [address]  Phone: [phone] Fax: [phone]    [facility] [address] [place] [address]  Phone: [phone] Fax: [phone]      Initial Assessment (most recent)       Adult Discharge Assessment - 02/01/23 1500          Discharge Assessment    Assessment Type Discharge Planning Assessment     Confirmed/corrected address, phone number and insurance Yes     Confirmed Demographics Correct on Facesheet     Source of Information patient     Does patient/caregiver understand observation status Yes     Communicated CHIQUI with patient/caregiver Yes     Reason For Admission SOB     People in Home spouse;grandchild(ricky)     Facility Arrived From: Home     Do you expect to return to your current living situation? Yes     Do you have help at home or someone to help you manage your care at home? Yes     Who are your caregiver(s) and their phone number(s)? Sister Jeanie [phone]     Prior to hospitilization cognitive status: Alert/Oriented;No Deficits     Current cognitive status: No  Deficits;Alert/Oriented     Walking or Climbing Stairs ambulation difficulty, requires equipment     Equipment Currently Used at Home CPAP;oxygen;walker, rolling     Readmission within 30 days? No     Patient currently being followed by outpatient case management? No     Do you currently have service(s) that help you manage your care at home? No     Do you take prescription medications? Yes     Do you have prescription coverage? Yes     Do you have any problems affording any of your prescribed medications? No     Is the patient taking medications as prescribed? yes     Who is going to help you get home at discharge? Family     How do you get to doctors appointments? car, drives self     Are you on dialysis? No     Do you take coumadin? No     Discharge Plan A Home with family     Discharge Plan B Home     DME Needed Upon Discharge  none     Discharge Plan discussed with: Patient     Discharge Barriers Identified Underinsured

## 2023-02-01 NOTE — ASSESSMENT & PLAN NOTE
Patient with Hypoxic Respiratory failure which is Acute on chronic.  she is not on home oxygen. Supplemental oxygen was provided and noted- Oxygen Concentration (%):  [30] 30.   Signs/symptoms of respiratory failure include- tachypnea, increased work of breathing and respiratory distress. Contributing diagnoses includes - CHF Labs and images were reviewed. Patient Has recent ABG, which has been reviewed. Will treat underlying causes and adjust management of respiratory failure    Hypoxic 88% on room air on admission    Plan:  Likely 2/2 Heart Failure Exacerbation  - CTA PE studies deferred given allergy and GFR  - Supplemental Oxygen as needed   - Wean as able  - Consider 6MWT on discharge

## 2023-02-01 NOTE — SUBJECTIVE & OBJECTIVE
Interval History: NAEON. Patient remains afebrile, HDS. VSS, currently on 2-3L NC when ambulating with transitions to room air. She reports that she has home oxygen for which she uses 2L with activity, off at rest. Today, she reports that she is feeling more comfortable and improving as compared to yesterday. Continues to have a dry cough and some occasional nausea. No chest pain, SOB, abd pain, n/v/d, LE edema. Planning to transition to PO Lasix home dose. Consulted HTS to ensure aware of patient    Review of Systems   Constitutional:  Positive for activity change, chills and fatigue. Negative for appetite change and fever.   HENT:  Positive for sore throat. Negative for congestion, ear pain, postnasal drip, rhinorrhea and sinus pressure.    Eyes:  Negative for photophobia, pain and visual disturbance.   Respiratory:  Positive for cough, chest tightness and shortness of breath. Negative for wheezing.    Cardiovascular:  Negative for chest pain, palpitations and leg swelling.   Gastrointestinal:  Positive for diarrhea and nausea. Negative for abdominal pain, constipation and vomiting.   Endocrine: Negative for cold intolerance and heat intolerance.   Genitourinary:  Negative for dysuria, flank pain, frequency and hematuria.   Musculoskeletal:  Negative for arthralgias and myalgias.   Skin:  Negative for pallor and rash.   Allergic/Immunologic: Negative for immunocompromised state.   Neurological:  Positive for weakness and headaches. Negative for dizziness, syncope, light-headedness and numbness.   Hematological:  Does not bruise/bleed easily.   Psychiatric/Behavioral:  Negative for agitation, confusion and dysphoric mood. The patient is not nervous/anxious.    Objective:     Vital Signs (Most Recent):  Temp: 96 °F (35.6 °C) (02/01/23 1136)  Pulse: 63 (02/01/23 1156)  Resp: 16 (02/01/23 1136)  BP: (!) 111/57 (02/01/23 1136)  SpO2: 96 % (02/01/23 1136)   Vital Signs (24h Range):  Temp:  [96 °F (35.6 °C)-98 °F (36.7  °C)] 96 °F (35.6 °C)  Pulse:  [58-98] 63  Resp:  [16-21] 16  SpO2:  [94 %-98 %] 96 %  BP: (105-117)/(56-67) 111/57     Weight: 89.8 kg (198 lb)  Body mass index is 31.96 kg/m².    Intake/Output Summary (Last 24 hours) at 2/1/2023 1352  Last data filed at 1/31/2023 2057  Gross per 24 hour   Intake --   Output 800 ml   Net -800 ml      Physical Exam  Vitals and nursing note reviewed.   Constitutional:       General: She is awake. She is not in acute distress.     Appearance: She is obese. She is not ill-appearing, toxic-appearing or diaphoretic.   HENT:      Head: Normocephalic and atraumatic.      Nose: Nose normal. No congestion or rhinorrhea.      Mouth/Throat:      Mouth: Mucous membranes are moist.      Pharynx: No oropharyngeal exudate or posterior oropharyngeal erythema.   Eyes:      General: No scleral icterus.        Right eye: No discharge.         Left eye: No discharge.      Extraocular Movements: Extraocular movements intact.      Pupils: Pupils are equal, round, and reactive to light.   Neck:      Vascular: No JVD.   Cardiovascular:      Rate and Rhythm: Normal rate and regular rhythm.      Pulses: Normal pulses.      Heart sounds: No murmur heard.    No friction rub.   Pulmonary:      Effort: Pulmonary effort is normal. No respiratory distress.      Breath sounds: No wheezing, rhonchi or rales.   Chest:      Chest wall: No tenderness.   Abdominal:      General: Bowel sounds are normal. There is no distension.      Palpations: Abdomen is soft.      Tenderness: There is no abdominal tenderness. There is no guarding or rebound.   Musculoskeletal:         General: No swelling or tenderness.      Cervical back: Normal range of motion. No rigidity.      Right lower leg: No edema.      Left lower leg: No edema.   Skin:     General: Skin is warm and dry.      Capillary Refill: Capillary refill takes less than 2 seconds.      Findings: No erythema or rash.   Neurological:      General: No focal deficit present.       Mental Status: She is alert and oriented to person, place, and time.      Sensory: No sensory deficit.      Motor: No weakness.   Psychiatric:         Mood and Affect: Mood normal.         Behavior: Behavior normal. Behavior is cooperative.         Thought Content: Thought content normal.       Significant Labs: All pertinent labs within the past 24 hours have been reviewed.    Significant Imaging: I have reviewed all pertinent imaging results/findings within the past 24 hours.

## 2023-02-01 NOTE — ASSESSMENT & PLAN NOTE
Patient with Persistent (7 days or more) atrial fibrillation which is controlled currently with Beta Blocker. Patient is currently in sinus rhythm.[id number] Score: 3. HASBLED Score: . Anticoagulation indicated. Anticoagulation done with Eliquis.    Current rhythm: Sinus  Home meds: Apixaban, Metoprolol  [id number] 3  HAS-BLED     Plan:  - Control with Metoprolol  - Anticoagulation with Apixaban  - Telemetry, continuous  - Maintain K > 4, Mg > 2, Ca wnl

## 2023-02-01 NOTE — PROGRESS NOTES
[facility] Medicine  Progress Note    Patient Name: Hafsa Hawley  MRN: [id number]  Patient Class: IP- Inpatient   Admission Date: 1/30/2023  Length of Stay: 2 days  Attending Physician: Cheng Roberts MD  Primary Care Provider: Cristobal Ann MD        Subjective:     Principal Problem:Chronic combined systolic and diastolic heart failure        HPI:  Mrs. Hafsa Hawley is a 57 y.o Female with a PMHx of NICM HFrEF (EF 10%) s/p AICD placement, Paroxysmal A Fib on Eliquis, Pulmonary Hypertension, Hypertension, Type 2 Diabetes Mellitus presenting with shortness of breath. She states that her dyspnea started a few days ago which she has been attempting to manage at home, but has had progressive worsening over the course of today while she was walking from her car to her Cardiologist appointment this morning. Her dyspnea was accompanied by subsequent nausea with multiple episodes of vomiting, after which she was prompted to defer to the Emergency Department. She additionally endorses intermittent chest tightness that has been ongoing for the last month, located from neck to sternum. She endorses decreased activity tolerance, chills, dizziness with headaches. Additionally notes sore throat and cough present for one year. She does not use oxygen at home.    In the Emergency Department, she was afebrile and hypertensive 155/96, hypoxic 88% on room air that improved to 92% on 2L NC. Initial CBC were remarkable for Hgb 8.0, baseline ~8.5. No leukocytosis. Initial CMP was remarkable for Creatinine 2.3, baseline approximately 1.8. BNP 2470, elevated above baseline. Troponin 0.623, below baseline 0.76. Chest X Ray was concerning for pulmonary edema and dense left basilar opacity. She was given Lasix 80 mg IV x 1 in the Emergency Department. She was admitted to Hospital Medicine for further evaluation, management, and coordination of care.      Overview/Hospital Course:  Admitted to Hospital Medicine with  acute on chronic heart failure exacerbation. Found to have elevated troponin on admission, however below her baseline. She was started on Lasix 80 mg IV BID with improvement in symptoms. HTS to follow, consulted for initiation of inpatient evaluation vs outpatient evaluation. Transitioning to home dose of Lasix.      Interval History: NAEON. Patient remains afebrile, HDS. VSS, currently on 2-3L NC when ambulating with transitions to room air. She reports that she has home oxygen for which she uses 2L with activity, off at rest. Today, she reports that she is feeling more comfortable and improving as compared to yesterday. Continues to have a dry cough and some occasional nausea. No chest pain, SOB, abd pain, n/v/d, LE edema. Planning to transition to PO Lasix home dose. Consulted HTS to ensure aware of patient    Review of Systems   Constitutional:  Positive for activity change, chills and fatigue. Negative for appetite change and fever.   HENT:  Positive for sore throat. Negative for congestion, ear pain, postnasal drip, rhinorrhea and sinus pressure.    Eyes:  Negative for photophobia, pain and visual disturbance.   Respiratory:  Positive for cough, chest tightness and shortness of breath. Negative for wheezing.    Cardiovascular:  Negative for chest pain, palpitations and leg swelling.   Gastrointestinal:  Positive for diarrhea and nausea. Negative for abdominal pain, constipation and vomiting.   Endocrine: Negative for cold intolerance and heat intolerance.   Genitourinary:  Negative for dysuria, flank pain, frequency and hematuria.   Musculoskeletal:  Negative for arthralgias and myalgias.   Skin:  Negative for pallor and rash.   Allergic/Immunologic: Negative for immunocompromised state.   Neurological:  Positive for weakness and headaches. Negative for dizziness, syncope, light-headedness and numbness.   Hematological:  Does not bruise/bleed easily.   Psychiatric/Behavioral:  Negative for agitation, confusion  and dysphoric mood. The patient is not nervous/anxious.    Objective:     Vital Signs (Most Recent):  Temp: 96 °F (35.6 °C) (02/01/23 1136)  Pulse: 63 (02/01/23 1156)  Resp: 16 (02/01/23 1136)  BP: (!) 111/57 (02/01/23 1136)  SpO2: 96 % (02/01/23 1136)   Vital Signs (24h Range):  Temp:  [96 °F (35.6 °C)-98 °F (36.7 °C)] 96 °F (35.6 °C)  Pulse:  [58-98] 63  Resp:  [16-21] 16  SpO2:  [94 %-98 %] 96 %  BP: (105-117)/(56-67) 111/57     Weight: 89.8 kg (198 lb)  Body mass index is 31.96 kg/m².    Intake/Output Summary (Last 24 hours) at 2/1/2023 1352  Last data filed at 1/31/2023 2057  Gross per 24 hour   Intake --   Output 800 ml   Net -800 ml      Physical Exam  Vitals and nursing note reviewed.   Constitutional:       General: She is awake. She is not in acute distress.     Appearance: She is obese. She is not ill-appearing, toxic-appearing or diaphoretic.   HENT:      Head: Normocephalic and atraumatic.      Nose: Nose normal. No congestion or rhinorrhea.      Mouth/Throat:      Mouth: Mucous membranes are moist.      Pharynx: No oropharyngeal exudate or posterior oropharyngeal erythema.   Eyes:      General: No scleral icterus.        Right eye: No discharge.         Left eye: No discharge.      Extraocular Movements: Extraocular movements intact.      Pupils: Pupils are equal, round, and reactive to light.   Neck:      Vascular: No JVD.   Cardiovascular:      Rate and Rhythm: Normal rate and regular rhythm.      Pulses: Normal pulses.      Heart sounds: No murmur heard.    No friction rub.   Pulmonary:      Effort: Pulmonary effort is normal. No respiratory distress.      Breath sounds: No wheezing, rhonchi or rales.   Chest:      Chest wall: No tenderness.   Abdominal:      General: Bowel sounds are normal. There is no distension.      Palpations: Abdomen is soft.      Tenderness: There is no abdominal tenderness. There is no guarding or rebound.   Musculoskeletal:         General: No swelling or tenderness.       Cervical back: Normal range of motion. No rigidity.      Right lower leg: No edema.      Left lower leg: No edema.   Skin:     General: Skin is warm and dry.      Capillary Refill: Capillary refill takes less than 2 seconds.      Findings: No erythema or rash.   Neurological:      General: No focal deficit present.      Mental Status: She is alert and oriented to person, place, and time.      Sensory: No sensory deficit.      Motor: No weakness.   Psychiatric:         Mood and Affect: Mood normal.         Behavior: Behavior normal. Behavior is cooperative.         Thought Content: Thought content normal.       Significant Labs: All pertinent labs within the past 24 hours have been reviewed.    Significant Imaging: I have reviewed all pertinent imaging results/findings within the past 24 hours.      Assessment/Plan:      * Acute on chronic combined systolic and diastolic heart failure  Echo (11/2022) showed EF 10%, combined  Home meds: Metoprolol, Entresto  Dry weight: 89.8  BNP 2470  S/p AICD    Chest X Ray (1/30/23): Monitoring leads overlie the chest.  Left subclavian approach AICD, as before. Enlarged cardiac silhouette and prominence and indistinctness of central pulmonary vasculature. Prominent markings could reflect pulmonary edema, noting that infectious or inflammatory etiology not excluded. Dense retrocardiac left basilar opacity could relate to atelectasis, aspiration or pneumonia. Small pleural effusions not excluded. No definite pneumothorax.     Plan:  Euvolemic  - HTS consulted, appreciated  - Transitioning to Lasix PO home dose from Lasix 80 mg IV BID  - Continue to Metoprolol, Entresto  - Daily weights (standing if tolerated)  - Strict I/Os  - Fluid restriction at 1500mL  - Cardiac diet  - Telemetry, continuous    Demand ischemia  Troponin 0.623 on admission  Baseline troponin ~ 0.76    Plan:  Likely demand ischemia in setting of ADHF  - Repeat troponin, trend  - Consider Cardiology consultation if  elevating  - Continue to monitor  - EKG PRN      Acute on chronic respiratory failure  Patient with Hypoxic Respiratory failure which is Acute on chronic.  she is not on home oxygen. Supplemental oxygen was provided and noted- Oxygen Concentration (%):  [30] 30.   Signs/symptoms of respiratory failure include- tachypnea, increased work of breathing and respiratory distress. Contributing diagnoses includes - CHF Labs and images were reviewed. Patient Has recent ABG, which has been reviewed. Will treat underlying causes and adjust management of respiratory failure    Hypoxic 88% on room air on admission    Plan:  Likely 2/2 Heart Failure Exacerbation  - CTA PE studies deferred given allergy and GFR  - Supplemental Oxygen as needed   - Wean as able  - Consider 6MWT on discharge    Type 2 diabetes mellitus without complication, without long-term current use of insulin  Patient's FSGs are controlled on current medication regimen.  Last A1c reviewed-   Lab Results   Component Value Date    HGBA1C 4.6 01/30/2023     Most recent fingerstick glucose reviewed-   Recent Labs   Lab 02/01/23  0854 02/01/23  1135   POCTGLUCOSE 170* 124*     Current correctional scale  Low  Maintain anti-hyperglycemic dose as follows-   Antihyperglycemics (From admission, onward)    Start     Stop Route Frequency Ordered    01/30/23 1343  insulin aspart U-100 pen 0-5 Units         -- SubQ Before meals & nightly PRN 01/30/23 1244        Plan:  - Goal 140-180  - Hold Oral hypoglycemics while patient is in the hospital  - LDSSI, adjust as needed  - Cardiac-diabetic diet if not NPO  - POCT accuchecks     ICD (implantable cardioverter-defibrillator) in place  See Acute on Chronic Heart Failure Exacerbation      CKD (chronic kidney disease), stage IV  Baseline Cr 1.8, Cr 2.3 on admission  Etiology likely 2/2 CRS  Associated proteinuria  No indications for HD at this time    Plan:  - Stable  - Trend Cr  - Strict I&Os with daily standing weights  - Avoid  nephrotoxic agents (NSAIDs, ACEi/ARB, Gadolinium, IV contrast)  - Renally adjust medications to current GFR  - MAPs > 65    Paroxysmal atrial fibrillation  Patient with Persistent (7 days or more) atrial fibrillation which is controlled currently with Beta Blocker. Patient is currently in sinus rhythm.[id number] Score: 3. HASBLED Score: . Anticoagulation indicated. Anticoagulation done with Eliquis.    Current rhythm: Sinus  Home meds: Apixaban, Metoprolol  [id number] 3  HAS-BLED     Plan:  - Control with Metoprolol  - Anticoagulation with Apixaban  - Telemetry, continuous  - Maintain K > 4, Mg > 2, Ca wnl        MELISSA (obstructive sleep apnea)  Patient states she does not use a CPAP at home    Plan:  - CPAP nightly      Essential hypertension  Patient on Entresto, Metoprolol, Isosorbide, Hydralazine, Lasix at home    Plan:  - Continue home medications        VTE Risk Mitigation (From admission, onward)         Ordered     apixaban tablet 5 mg  2 times daily         01/30/23 1244     Reason for No Pharmacological VTE Prophylaxis  Once        Question:  Reasons:  Answer:  Already adequately anticoagulated on oral Anticoagulants    01/30/23 1244     Place sequential compression device  Until discontinued         01/30/23 1244     IP VTE HIGH RISK PATIENT  Once         01/30/23 1244                Discharge Planning   CHIQUI: 2/3/2023     Code Status: Full Code   Is the patient medically ready for discharge?: No    Reason for patient still in hospital (select all that apply): Patient trending condition and Consult recommendations                     Hattie Dallas DO  Department of Hospital Medicine   [facility]

## 2023-02-02 ENCOUNTER — TELEPHONE (OUTPATIENT)
Dept: FAMILY MEDICINE | Facility: CLINIC | Age: 58
End: 2023-02-02
Payer: MEDICAID

## 2023-02-02 LAB
ALBUMIN SERPL BCP-MCNC: 3.3 G/DL (ref 3.5–5.2)
ALP SERPL-CCNC: 40 U/L (ref 55–135)
ALT SERPL W/O P-5'-P-CCNC: 14 U/L (ref 10–44)
ANION GAP SERPL CALC-SCNC: 10 MMOL/L (ref 8–16)
AST SERPL-CCNC: 13 U/L (ref 10–40)
BASOPHILS # BLD AUTO: 0.02 K/UL (ref 0–0.2)
BASOPHILS NFR BLD: 0.5 % (ref 0–1.9)
BILIRUB SERPL-MCNC: 1.5 MG/DL (ref 0.1–1)
BUN SERPL-MCNC: 38 MG/DL (ref 6–20)
CALCIUM SERPL-MCNC: 8.8 MG/DL (ref 8.7–10.5)
CHLORIDE SERPL-SCNC: 105 MMOL/L (ref 95–110)
CO2 SERPL-SCNC: 25 MMOL/L (ref 23–29)
CREAT SERPL-MCNC: 1.9 MG/DL (ref 0.5–1.4)
DIFFERENTIAL METHOD: ABNORMAL
EOSINOPHIL # BLD AUTO: 0.1 K/UL (ref 0–0.5)
EOSINOPHIL NFR BLD: 2.6 % (ref 0–8)
ERYTHROCYTE [DISTWIDTH] IN BLOOD BY AUTOMATED COUNT: 29 % (ref 11.5–14.5)
EST. GFR  (NO RACE VARIABLE): 30.4 ML/MIN/1.73 M^2
GLUCOSE SERPL-MCNC: 95 MG/DL (ref 70–110)
HCT VFR BLD AUTO: 25.8 % (ref 37–48.5)
HGB BLD-MCNC: 7.5 G/DL (ref 12–16)
IMM GRANULOCYTES # BLD AUTO: 0.04 K/UL (ref 0–0.04)
IMM GRANULOCYTES NFR BLD AUTO: 0.9 % (ref 0–0.5)
LYMPHOCYTES # BLD AUTO: 0.8 K/UL (ref 1–4.8)
LYMPHOCYTES NFR BLD: 19.3 % (ref 18–48)
MAGNESIUM SERPL-MCNC: 1.9 MG/DL (ref 1.6–2.6)
MCH RBC QN AUTO: 17.8 PG (ref 27–31)
MCHC RBC AUTO-ENTMCNC: 29.1 G/DL (ref 32–36)
MCV RBC AUTO: 61 FL (ref 82–98)
MONOCYTES # BLD AUTO: 0.3 K/UL (ref 0.3–1)
MONOCYTES NFR BLD: 7.9 % (ref 4–15)
NEUTROPHILS # BLD AUTO: 3 K/UL (ref 1.8–7.7)
NEUTROPHILS NFR BLD: 68.8 % (ref 38–73)
NRBC BLD-RTO: 5 /100 WBC
PHOSPHATE SERPL-MCNC: 3.3 MG/DL (ref 2.7–4.5)
PLATELET # BLD AUTO: 141 K/UL (ref 150–450)
PMV BLD AUTO: ABNORMAL FL (ref 9.2–12.9)
POCT GLUCOSE: 133 MG/DL (ref 70–110)
POCT GLUCOSE: 134 MG/DL (ref 70–110)
POCT GLUCOSE: 134 MG/DL (ref 70–110)
POCT GLUCOSE: 148 MG/DL (ref 70–110)
POTASSIUM SERPL-SCNC: 4.1 MMOL/L (ref 3.5–5.1)
PROT SERPL-MCNC: 5.7 G/DL (ref 6–8.4)
RBC # BLD AUTO: 4.22 M/UL (ref 4–5.4)
SODIUM SERPL-SCNC: 140 MMOL/L (ref 136–145)
WBC # BLD AUTO: 4.31 K/UL (ref 3.9–12.7)

## 2023-02-02 PROCEDURE — 99232 SBSQ HOSP IP/OBS MODERATE 35: CPT | Mod: NTX,,, | Performed by: HOSPITALIST

## 2023-02-02 PROCEDURE — 63600175 PHARM REV CODE 636 W HCPCS: Mod: NTX | Performed by: STUDENT IN AN ORGANIZED HEALTH CARE EDUCATION/TRAINING PROGRAM

## 2023-02-02 PROCEDURE — 25000003 PHARM REV CODE 250: Mod: NTX | Performed by: STUDENT IN AN ORGANIZED HEALTH CARE EDUCATION/TRAINING PROGRAM

## 2023-02-02 PROCEDURE — 27000221 HC OXYGEN, UP TO 24 HOURS: Mod: NTX

## 2023-02-02 PROCEDURE — 11000001 HC ACUTE MED/SURG PRIVATE ROOM: Mod: NTX

## 2023-02-02 PROCEDURE — 94761 N-INVAS EAR/PLS OXIMETRY MLT: CPT | Mod: NTX

## 2023-02-02 PROCEDURE — 99232 PR SUBSEQUENT HOSPITAL CARE,LEVL II: ICD-10-PCS | Mod: NTX,,, | Performed by: HOSPITALIST

## 2023-02-02 PROCEDURE — 36415 COLL VENOUS BLD VENIPUNCTURE: CPT | Mod: NTX | Performed by: STUDENT IN AN ORGANIZED HEALTH CARE EDUCATION/TRAINING PROGRAM

## 2023-02-02 PROCEDURE — 94640 AIRWAY INHALATION TREATMENT: CPT | Mod: NTX

## 2023-02-02 PROCEDURE — 83735 ASSAY OF MAGNESIUM: CPT | Mod: NTX | Performed by: STUDENT IN AN ORGANIZED HEALTH CARE EDUCATION/TRAINING PROGRAM

## 2023-02-02 PROCEDURE — 84100 ASSAY OF PHOSPHORUS: CPT | Mod: NTX | Performed by: STUDENT IN AN ORGANIZED HEALTH CARE EDUCATION/TRAINING PROGRAM

## 2023-02-02 PROCEDURE — 25000003 PHARM REV CODE 250: Mod: NTX

## 2023-02-02 PROCEDURE — 94660 CPAP INITIATION&MGMT: CPT | Mod: NTX

## 2023-02-02 PROCEDURE — 99221 PR INITIAL HOSPITAL CARE,LEVL I: ICD-10-PCS | Mod: NTX,,, | Performed by: INTERNAL MEDICINE

## 2023-02-02 PROCEDURE — 85025 COMPLETE CBC W/AUTO DIFF WBC: CPT | Mod: NTX | Performed by: STUDENT IN AN ORGANIZED HEALTH CARE EDUCATION/TRAINING PROGRAM

## 2023-02-02 PROCEDURE — 99221 1ST HOSP IP/OBS SF/LOW 40: CPT | Mod: NTX,,, | Performed by: INTERNAL MEDICINE

## 2023-02-02 PROCEDURE — 99900035 HC TECH TIME PER 15 MIN (STAT): Mod: NTX

## 2023-02-02 PROCEDURE — 80053 COMPREHEN METABOLIC PANEL: CPT | Mod: NTX | Performed by: STUDENT IN AN ORGANIZED HEALTH CARE EDUCATION/TRAINING PROGRAM

## 2023-02-02 PROCEDURE — 25000242 PHARM REV CODE 250 ALT 637 W/ HCPCS: Mod: NTX

## 2023-02-02 RX ORDER — FUROSEMIDE 10 MG/ML
80 INJECTION INTRAMUSCULAR; INTRAVENOUS EVERY 8 HOURS
Status: DISCONTINUED | OUTPATIENT
Start: 2023-02-02 | End: 2023-02-03 | Stop reason: HOSPADM

## 2023-02-02 RX ADMIN — ISOSORBIDE DINITRATE 30 MG: 30 TABLET ORAL at 02:02

## 2023-02-02 RX ADMIN — IPRATROPIUM BROMIDE AND ALBUTEROL SULFATE 3 ML: 2.5; .5 SOLUTION RESPIRATORY (INHALATION) at 08:02

## 2023-02-02 RX ADMIN — FUROSEMIDE 80 MG: 10 INJECTION, SOLUTION INTRAMUSCULAR; INTRAVENOUS at 02:02

## 2023-02-02 RX ADMIN — ATORVASTATIN CALCIUM 40 MG: 40 TABLET, FILM COATED ORAL at 09:02

## 2023-02-02 RX ADMIN — TIOTROPIUM BROMIDE INHALATION SPRAY 2 PUFF: 3.12 SPRAY, METERED RESPIRATORY (INHALATION) at 08:02

## 2023-02-02 RX ADMIN — APIXABAN 5 MG: 5 TABLET, FILM COATED ORAL at 09:02

## 2023-02-02 RX ADMIN — METOPROLOL SUCCINATE 100 MG: 100 TABLET, EXTENDED RELEASE ORAL at 09:02

## 2023-02-02 RX ADMIN — ISOSORBIDE DINITRATE 30 MG: 30 TABLET ORAL at 09:02

## 2023-02-02 RX ADMIN — ROPINIROLE 4 MG: 2 TABLET, FILM COATED ORAL at 09:02

## 2023-02-02 RX ADMIN — IPRATROPIUM BROMIDE AND ALBUTEROL SULFATE 3 ML: 2.5; .5 SOLUTION RESPIRATORY (INHALATION) at 12:02

## 2023-02-02 RX ADMIN — GUAIFENESIN AND DEXTROMETHORPHAN 5 ML: 100; 10 SYRUP ORAL at 05:02

## 2023-02-02 RX ADMIN — FUROSEMIDE 80 MG: 10 INJECTION, SOLUTION INTRAMUSCULAR; INTRAVENOUS at 09:02

## 2023-02-02 RX ADMIN — FUROSEMIDE 80 MG: 80 TABLET ORAL at 09:02

## 2023-02-02 RX ADMIN — SACUBITRIL AND VALSARTAN 1 TABLET: 97; 103 TABLET, FILM COATED ORAL at 09:02

## 2023-02-02 RX ADMIN — GUAIFENESIN AND DEXTROMETHORPHAN 5 ML: 100; 10 SYRUP ORAL at 09:02

## 2023-02-02 NOTE — PROGRESS NOTES
[facility] Medicine  Progress Note    Patient Name: Hafsa Hawley  MRN: [id number]  Patient Class: IP- Inpatient   Admission Date: 1/30/2023  Length of Stay: 3 days  Attending Physician: Andrea Rodrigues MD  Primary Care Provider: Cristobal Ann MD        Subjective:     Principal Problem:Chronic combined systolic and diastolic heart failure        HPI:  Mrs. Hafsa Hawley is a 57 y.o Female with a PMHx of NICM HFrEF (EF 10%) s/p AICD placement, Paroxysmal A Fib on Eliquis, Pulmonary Hypertension, Hypertension, Type 2 Diabetes Mellitus presenting with shortness of breath. She states that her dyspnea started a few days ago which she has been attempting to manage at home, but has had progressive worsening over the course of today while she was walking from her car to her Cardiologist appointment this morning. Her dyspnea was accompanied by subsequent nausea with multiple episodes of vomiting, after which she was prompted to defer to the Emergency Department. She additionally endorses intermittent chest tightness that has been ongoing for the last month, located from neck to sternum. She endorses decreased activity tolerance, chills, dizziness with headaches. Additionally notes sore throat and cough present for one year. She does not use oxygen at home.    In the Emergency Department, she was afebrile and hypertensive 155/96, hypoxic 88% on room air that improved to 92% on 2L NC. Initial CBC were remarkable for Hgb 8.0, baseline ~8.5. No leukocytosis. Initial CMP was remarkable for Creatinine 2.3, baseline approximately 1.8. BNP 2470, elevated above baseline. Troponin 0.623, below baseline 0.76. Chest X Ray was concerning for pulmonary edema and dense left basilar opacity. She was given Lasix 80 mg IV x 1 in the Emergency Department. She was admitted to Hospital Medicine for further evaluation, management, and coordination of care.      Overview/Hospital Course:  Admitted to Hospital Medicine with  acute on chronic heart failure exacerbation. Found to have elevated troponin on admission, however below her baseline. She was started on Lasix 80 mg IV BID with improvement in symptoms. HTS to follow, consulted for initiation of inpatient evaluation vs outpatient evaluation. Transitioning to home dose of Lasix.      Interval History: NAEON. Comfortable on RA. Sitting on couch. Denies CP/palpitations/shortness of breath.         Review of Systems   Constitutional:  Negative for activity change, appetite change, chills, fatigue and fever.   HENT:  Negative for congestion, ear pain, postnasal drip, rhinorrhea, sinus pressure and sore throat.    Eyes:  Negative for photophobia, pain and visual disturbance.   Respiratory:  Negative for cough, chest tightness, shortness of breath and wheezing.    Cardiovascular:  Negative for chest pain, palpitations and leg swelling.   Gastrointestinal:  Positive for nausea. Negative for abdominal pain, constipation, diarrhea and vomiting.   Endocrine: Negative for cold intolerance and heat intolerance.   Genitourinary:  Negative for dysuria, flank pain, frequency and hematuria.   Musculoskeletal:  Negative for arthralgias and myalgias.   Skin:  Negative for pallor and rash.   Allergic/Immunologic: Negative for immunocompromised state.   Neurological:  Negative for dizziness, syncope, weakness, light-headedness, numbness and headaches.   Hematological:  Does not bruise/bleed easily.   Psychiatric/Behavioral:  Negative for agitation, confusion and dysphoric mood. The patient is not nervous/anxious.    Objective:     Vital Signs (Most Recent):  Temp: 98.6 °F (37 °C) (02/02/23 1125)  Pulse: 66 (02/02/23 1252)  Resp: 12 (02/02/23 1252)  BP: (!) 143/69 (02/02/23 0900)  SpO2: 96 % (02/02/23 1125)   Vital Signs (24h Range):  Temp:  [96.4 °F (35.8 °C)-98.6 °F (37 °C)] 98.6 °F (37 °C)  Pulse:  [56-80] 66  Resp:  [12-20] 12  SpO2:  [92 %-96 %] 96 %  BP: (106-143)/(55-75) 143/69     Weight: 89.8  kg (198 lb)  Body mass index is 31.96 kg/m².  No intake or output data in the 24 hours ending 02/02/23 1327     Physical Exam  Vitals and nursing note reviewed.   Constitutional:       General: She is awake. She is not in acute distress.     Appearance: She is obese. She is not ill-appearing, toxic-appearing or diaphoretic.   HENT:      Head: Normocephalic and atraumatic.      Nose: Nose normal. No congestion or rhinorrhea.      Mouth/Throat:      Mouth: Mucous membranes are moist.      Pharynx: No oropharyngeal exudate or posterior oropharyngeal erythema.   Eyes:      General: No scleral icterus.        Right eye: No discharge.         Left eye: No discharge.      Extraocular Movements: Extraocular movements intact.      Pupils: Pupils are equal, round, and reactive to light.   Neck:      Vascular: No JVD.   Cardiovascular:      Rate and Rhythm: Normal rate and regular rhythm.      Pulses: Normal pulses.      Heart sounds: No murmur heard.    No friction rub.   Pulmonary:      Effort: Pulmonary effort is normal. No respiratory distress.      Breath sounds: No wheezing, rhonchi or rales.   Chest:      Chest wall: No tenderness.   Abdominal:      General: Bowel sounds are normal. There is no distension.      Palpations: Abdomen is soft.      Tenderness: There is no abdominal tenderness. There is no guarding or rebound.   Musculoskeletal:         General: No swelling or tenderness.      Cervical back: Normal range of motion. No rigidity.      Right lower leg: No edema.      Left lower leg: No edema.   Skin:     General: Skin is warm and dry.      Capillary Refill: Capillary refill takes less than 2 seconds.      Findings: No erythema or rash.   Neurological:      General: No focal deficit present.      Mental Status: She is alert and oriented to person, place, and time.      Sensory: No sensory deficit.      Motor: No weakness.   Psychiatric:         Mood and Affect: Mood normal.         Behavior: Behavior normal.  Behavior is cooperative.         Thought Content: Thought content normal.       Significant Labs: All pertinent labs within the past 24 hours have been reviewed.    Significant Imaging: I have reviewed all pertinent imaging results/findings within the past 24 hours.      Assessment/Plan:      * Acute on chronic combined systolic and diastolic heart failure  Echo (11/2022) showed EF 10%, combined  Home meds: Metoprolol, Entresto  Dry weight: 89.8  BNP 2470  S/p AICD    Chest X Ray (1/30/23): Monitoring leads overlie the chest.  Left subclavian approach AICD, as before. Enlarged cardiac silhouette and prominence and indistinctness of central pulmonary vasculature. Prominent markings could reflect pulmonary edema, noting that infectious or inflammatory etiology not excluded. Dense retrocardiac left basilar opacity could relate to atelectasis, aspiration or pneumonia. Small pleural effusions not excluded. No definite pneumothorax.     Plan:  Euvolemic  - HTS consulted, appreciated  - Started on Lasix 80 mg IV tid per HTS  - Continue to Metoprolol, Entresto  - Daily weights (standing if tolerated)  - Strict I/Os  - Fluid restriction at 1500mL  - Cardiac diet  - Telemetry, continuous    Demand ischemia  Troponin 0.623 on admission  Baseline troponin ~ 0.76    Plan:  Likely demand ischemia in setting of ADHF  - Repeat troponin, trend  - Consider Cardiology consultation if elevating  - Continue to monitor  - EKG PRN      Acute on chronic respiratory failure with hypoxia  Patient with Hypoxic Respiratory failure which is Acute on chronic.  she is not on home oxygen. Supplemental oxygen was provided and noted- Oxygen Concentration (%):  [30] 30.   Signs/symptoms of respiratory failure include- tachypnea, increased work of breathing and respiratory distress. Contributing diagnoses includes - CHF Labs and images were reviewed. Patient Has recent ABG, which has been reviewed. Will treat underlying causes and adjust management of  respiratory failure    Hypoxic 88% on room air on admission    Plan:  Likely 2/2 Heart Failure Exacerbation  - CTA PE studies deferred given allergy and GFR  - Supplemental Oxygen as needed   - Wean as able  - Consider 6MWT on discharge    Type 2 diabetes mellitus without complication, without long-term current use of insulin  Patient's FSGs are controlled on current medication regimen.  Last A1c reviewed-   Lab Results   Component Value Date    HGBA1C 4.6 01/30/2023     Most recent fingerstick glucose reviewed-   Recent Labs   Lab 02/01/23  0854 02/01/23  1135   POCTGLUCOSE 170* 124*     Current correctional scale  Low  Maintain anti-hyperglycemic dose as follows-   Antihyperglycemics (From admission, onward)    Start     Stop Route Frequency Ordered    01/30/23 1343  insulin aspart U-100 pen 0-5 Units         -- SubQ Before meals & nightly PRN 01/30/23 1244        Plan:  - Goal 140-180  - Hold Oral hypoglycemics while patient is in the hospital  - LDSSI, adjust as needed  - Cardiac-diabetic diet if not NPO  - POCT accuchecks     ICD (implantable cardioverter-defibrillator) in place  See Acute on Chronic Heart Failure Exacerbation      CKD (chronic kidney disease), stage IV  Baseline Cr 1.8, Cr 2.3 on admission  Etiology likely 2/2 CRS  Associated proteinuria  No indications for HD at this time    Plan:  - Stable  - Trend Cr  - Strict I&Os with daily standing weights  - Avoid nephrotoxic agents (NSAIDs, ACEi/ARB, Gadolinium, IV contrast)  - Renally adjust medications to current GFR  - MAPs > 65    Paroxysmal atrial fibrillation  Patient with Persistent (7 days or more) atrial fibrillation which is controlled currently with Beta Blocker. Patient is currently in sinus rhythm.[id number] Score: 3. HASBLED Score: . Anticoagulation indicated. Anticoagulation done with Eliquis.    Current rhythm: Sinus  Home meds: Apixaban, Metoprolol  [id number] 3  HAS-BLED     Plan:  - Control with Metoprolol  - Anticoagulation with  Apixaban  - Telemetry, continuous  - Maintain K > 4, Mg > 2, Ca wnl        MELISSA (obstructive sleep apnea)  Patient states she does not use a CPAP at home    Plan:  - CPAP nightly      Essential hypertension  Patient on Entresto, Metoprolol, Isosorbide, Hydralazine, Lasix at home    Plan:  - Continue home medications      VTE Risk Mitigation (From admission, onward)         Ordered     apixaban tablet 5 mg  2 times daily         01/30/23 1244     Reason for No Pharmacological VTE Prophylaxis  Once        Question:  Reasons:  Answer:  Already adequately anticoagulated on oral Anticoagulants    01/30/23 1244     Place sequential compression device  Until discontinued         01/30/23 1244     IP VTE HIGH RISK PATIENT  Once         01/30/23 1244                Discharge Planning   CHIQUI: 2/3/2023     Code Status: Full Code   Is the patient medically ready for discharge?: No    Reason for patient still in hospital (select all that apply): Treatment and Consult recommendations  Discharge Plan A: Home with family                  Dandy Chen MD  Department of Hospital Medicine   [facility]

## 2023-02-02 NOTE — SUBJECTIVE & OBJECTIVE
Interval History: NAEON. Comfortable on RA. Sitting on couch. Denies CP/palpitations/shortness of breath.         Review of Systems   Constitutional:  Negative for activity change, appetite change, chills, fatigue and fever.   HENT:  Negative for congestion, ear pain, postnasal drip, rhinorrhea, sinus pressure and sore throat.    Eyes:  Negative for photophobia, pain and visual disturbance.   Respiratory:  Negative for cough, chest tightness, shortness of breath and wheezing.    Cardiovascular:  Negative for chest pain, palpitations and leg swelling.   Gastrointestinal:  Positive for nausea. Negative for abdominal pain, constipation, diarrhea and vomiting.   Endocrine: Negative for cold intolerance and heat intolerance.   Genitourinary:  Negative for dysuria, flank pain, frequency and hematuria.   Musculoskeletal:  Negative for arthralgias and myalgias.   Skin:  Negative for pallor and rash.   Allergic/Immunologic: Negative for immunocompromised state.   Neurological:  Negative for dizziness, syncope, weakness, light-headedness, numbness and headaches.   Hematological:  Does not bruise/bleed easily.   Psychiatric/Behavioral:  Negative for agitation, confusion and dysphoric mood. The patient is not nervous/anxious.    Objective:     Vital Signs (Most Recent):  Temp: 98.6 °F (37 °C) (02/02/23 1125)  Pulse: 66 (02/02/23 1252)  Resp: 12 (02/02/23 1252)  BP: (!) 143/69 (02/02/23 0900)  SpO2: 96 % (02/02/23 1125)   Vital Signs (24h Range):  Temp:  [96.4 °F (35.8 °C)-98.6 °F (37 °C)] 98.6 °F (37 °C)  Pulse:  [56-80] 66  Resp:  [12-20] 12  SpO2:  [92 %-96 %] 96 %  BP: (106-143)/(55-75) 143/69     Weight: 89.8 kg (198 lb)  Body mass index is 31.96 kg/m².  No intake or output data in the 24 hours ending 02/02/23 1327     Physical Exam  Vitals and nursing note reviewed.   Constitutional:       General: She is awake. She is not in acute distress.     Appearance: She is obese. She is not ill-appearing, toxic-appearing or  diaphoretic.   HENT:      Head: Normocephalic and atraumatic.      Nose: Nose normal. No congestion or rhinorrhea.      Mouth/Throat:      Mouth: Mucous membranes are moist.      Pharynx: No oropharyngeal exudate or posterior oropharyngeal erythema.   Eyes:      General: No scleral icterus.        Right eye: No discharge.         Left eye: No discharge.      Extraocular Movements: Extraocular movements intact.      Pupils: Pupils are equal, round, and reactive to light.   Neck:      Vascular: No JVD.   Cardiovascular:      Rate and Rhythm: Normal rate and regular rhythm.      Pulses: Normal pulses.      Heart sounds: No murmur heard.    No friction rub.   Pulmonary:      Effort: Pulmonary effort is normal. No respiratory distress.      Breath sounds: No wheezing, rhonchi or rales.   Chest:      Chest wall: No tenderness.   Abdominal:      General: Bowel sounds are normal. There is no distension.      Palpations: Abdomen is soft.      Tenderness: There is no abdominal tenderness. There is no guarding or rebound.   Musculoskeletal:         General: No swelling or tenderness.      Cervical back: Normal range of motion. No rigidity.      Right lower leg: No edema.      Left lower leg: No edema.   Skin:     General: Skin is warm and dry.      Capillary Refill: Capillary refill takes less than 2 seconds.      Findings: No erythema or rash.   Neurological:      General: No focal deficit present.      Mental Status: She is alert and oriented to person, place, and time.      Sensory: No sensory deficit.      Motor: No weakness.   Psychiatric:         Mood and Affect: Mood normal.         Behavior: Behavior normal. Behavior is cooperative.         Thought Content: Thought content normal.       Significant Labs: All pertinent labs within the past 24 hours have been reviewed.    Significant Imaging: I have reviewed all pertinent imaging results/findings within the past 24 hours.

## 2023-02-02 NOTE — PHARMACY MED REC
"Admission Medication History     The home medication history was taken by Kellie Del Valle.    You may go to "Admission" then "Reconcile Home Medications" tabs to review and/or act upon these items.     The home medication list has been updated by the Pharmacy department.   Please read ALL comments highlighted in yellow.   Please address this information as you see fit.    Feel free to contact us if you have any questions or require assistance.      The medications listed below were removed from the home medication list. Please reorder if appropriate:  Patient reports no longer taking the following medication(s):  MULTIVITAMIN       Medications listed below were obtained from: Patient/family  PTA Medications   Medication Sig    acetaminophen (TYLENOL) 500 MG tablet   Take 1,000 mg by mouth 2 (two) times daily as needed for Pain.    albuterol-ipratropium (DUO-NEB) 2.5 mg-0.5 mg/3 mL nebulizer solution   Take 3 mLs by nebulization 2 (two) times a day.    allopurinoL (ZYLOPRIM) 100 MG tablet   Take 100 mg by mouth daily as needed (gout attack).)    ALPRAZolam (XANAX) 2 MG Tab   Take 0.25-2 mg by mouth 2 (two) times daily as needed (anxiety).    apixaban (ELIQUIS) 5 mg Tab     Take 1 tablet (5 mg total) by mouth 2 (two) times daily.    atorvastatin (LIPITOR) 40 MG tablet   Take 1 tablet (40 mg total) by mouth every evening.    b complex vitamins tablet   Take 1 tablet by mouth once daily.    blood sugar diagnostic (ACCU-CHEK GUIDE TEST STRIPS) Strp   1 strip by Other route 3 (three) times daily.    cetirizine (ZYRTEC) 10 MG tablet   Take 10 mg by mouth daily as needed for Allergies.    diclofenac sodium (VOLTAREN) 1 % Gel   APPLY 2 G TOPICALLY 3 (THREE) TIMES DAILY AS NEEDED (PAIN).    DULoxetine (CYMBALTA) 30 MG capsule   Take 1 capsule orally once a day.    empagliflozin (JARDIANCE) 25 mg tablet   Take 25 mg by mouth once daily.    ergocalciferol (ERGOCALCIFEROL) 50,000 unit Cap   Take 50,000 Units by mouth every " Saturday.)    ferrous sulfate 325 (65 FE) MG EC tablet   Take 325 mg by mouth 2 (two) times daily.)    fluticasone propionate (FLONASE) 50 mcg/actuation nasal spray   2 sprays by Each Nostril route daily as needed for Rhinitis.     furosemide (LASIX) 80 MG tablet   Take 1 tablet (80 mg total) by mouth 2 (two) times daily.    glimepiride (AMARYL) 2 MG tablet   TAKE 1 TABLET BY MOUTH EVERY DAY WITH BREAKFAST    isosorbide dinitrate (ISORDIL) 30 MG Tab   Take 30 mg by mouth 2 (two) times daily.)    lancets (ACCU-CHEK SOFTCLIX LANCETS) Misc   1 Device by Misc.(Non-Drug; Combo Route) route 3 (three) times daily.    LIDOcaine (LIDODERM) 5 %       Place 1 patch onto the skin daily as needed (pain). Remove & Discard patch within 12 hours or as directed by MD)    metoprolol succinate (TOPROL-XL) 50 MG 24 hr tablet   Take 1 tablet (50 mg total) by mouth once daily.    mometasone-formoterol (DULERA) 200-5 mcg/actuation inhaler   Inhale 2 puffs into the lungs 2 (two) times daily. Controller    montelukast (SINGULAIR) 10 mg tablet   Take 1 tablet (10 mg total) by mouth once daily.    ondansetron (ZOFRAN-ODT) 4 MG TbDL   Take 4 mg by mouth every 12 (twelve) hours as needed (nausea).)    pantoprazole (PROTONIX) 40 MG tablet   TAKE 1 TABLET BY MOUTH DAILY IN THE MORNING    potassium chloride (MICRO-K) 10 MEQ CpSR   Take 1 capsule (10 mEq total) by mouth once daily.    rOPINIRole (REQUIP) 4 MG tablet   Take 1 tablet (4 mg total) by mouth once daily. Pt taking 4mg daily    sacubitriL-valsartan (ENTRESTO)  mg per tablet   Take 1 tablet by mouth 2 (two) times daily.    scopolamine (TRANSDERM-SCOP) 1.3-1.5 mg (1 mg over 3 days)   Place 1 patch onto the skin every 72 hours as needed (nausea).    SPIRIVA WITH HANDIHALER 18 mcg inhalation capsule   INHALE 1 CAPSULE INTO THE LUNGS ONCE DAILY.    VENTOLIN HFA 90 mcg/actuation inhaler   Inhale 2 puffs into the lungs every 6 (six) hours as needed for Shortness of Breath or Wheezing.     NITROSTAT 0.4 mg SL tablet Take 2.5 tablets (1 mg total) by mouth every 5 (five) minutes as needed for Chest pain. No more than 3 tablets in 15 minutes.       Potential issues to be addressed PRIOR TO DISCHARGE  Patient reported not taking the following medications: (AMIODARONE 200 MG & HYDRALAZINE 100 MG). These medications remain on the home medication list. Please address accordingly.     Please discuss with the patient barriers to adherence with medication treatment plans    Patient requires education regarding drug therapies     Patient requested refills for the following medications: (PROMETHAZINE/CODEINE SYRUP)    Kellie Del Valle  [phone]                  .

## 2023-02-02 NOTE — PLAN OF CARE
Problem: Adult Inpatient Plan of Care  Goal: Plan of Care Review  Outcome: Ongoing, Progressing  Goal: Patient-Specific Goal (Individualized)  Outcome: Ongoing, Progressing  Goal: Absence of Hospital-Acquired Illness or Injury  Outcome: Ongoing, Progressing  Goal: Optimal Comfort and Wellbeing  Outcome: Ongoing, Progressing  Goal: Readiness for Transition of Care  Outcome: Ongoing, Progressing     Problem: Adult Inpatient Plan of Care  Goal: Optimal Comfort and Wellbeing  Outcome: Ongoing, Progressing

## 2023-02-02 NOTE — ASSESSMENT & PLAN NOTE
Echo (11/2022) showed EF 10%, combined  Home meds: Metoprolol, Entresto  Dry weight: 89.8  BNP 2470  S/p AICD    Chest X Ray (1/30/23): Monitoring leads overlie the chest.  Left subclavian approach AICD, as before. Enlarged cardiac silhouette and prominence and indistinctness of central pulmonary vasculature. Prominent markings could reflect pulmonary edema, noting that infectious or inflammatory etiology not excluded. Dense retrocardiac left basilar opacity could relate to atelectasis, aspiration or pneumonia. Small pleural effusions not excluded. No definite pneumothorax.     Plan:  Euvolemic  - HTS consulted, appreciated  - Started on Lasix 80 mg IV tid per HTS  - Continue to Metoprolol, Entresto  - Daily weights (standing if tolerated)  - Strict I/Os  - Fluid restriction at 1500mL  - Cardiac diet  - Telemetry, continuous

## 2023-02-02 NOTE — SUBJECTIVE & OBJECTIVE
Past Medical History:   Diagnosis Date    Anemia     Arthritis     Atrial fibrillation     OAC    Chronic respiratory failure with hypoxia, on home oxygen therapy     2L with activity, off at rest.  Per Pulm  no overt evidence of ILD or COPD on PFTs and CT to explain O2 needs.    CKD (chronic kidney disease), stage IV 05/08/2018    Congestive heart failure     s/p AICD placement,    Deep vein thrombosis     Depression     elevated bilirubin d/t Gilbert's syndrome     confirmed by [place] genetic testing, evaluated by hepatology    Encounter for blood transfusion     GERD (gastroesophageal reflux disease)     Hypertension     Pheochromocytoma, malignant     Right kidney mass     Sleep apnea     Thalassemia trait, alpha     Thyroid disease     Type 2 diabetes mellitus with hyperglycemia, without long-term current use of insulin 08/13/2020       Past Surgical History:   Procedure Laterality Date    APPENDECTOMY      BONE MARROW BIOPSY      CARDIAC DEFIBRILLATOR PLACEMENT Left 12/2016    CARDIAC ELECTROPHYSIOLOGY MAPPING AND ABLATION      CARDIAC ELECTROPHYSIOLOGY MAPPING AND ABLATION      COLONOSCOPY N/A 5/6/2022    Procedure: COLONOSCOPY;  Surgeon: Arely Betancourt MD;  Location: [facility] ([address] [place] [address]);  Service: Endoscopy;  Laterality: N/A;  heart transplant candidate/ EF 25% - 2nd floor/ defib - Biotronik - ERW  Eliquis - per Dr. Cortez with [facility], Pt ok to hold Eliquis x 2 days prior-see media tab-outside correspondence dated 12/30/21  - ERW  verbal instructions/portal instructions/email instructions - s    EYE SURGERY      due to running tears    FRACTURE SURGERY Left     hand 5th digit    HYSTERECTOMY      KNEE SURGERY Left 2016    hematoma    LIVER BIOPSY  10/24/2018    Minimal steatosis, predominantly macrovesicular, 1%, Minimal nonspecific portal inflammation, no fibrosis. No findings on biopsy to explain elevated bilirubin levels. Could be d/t Gilbert's =?- hemolysis    RIGHT HEART CATHETERIZATION Right  "12/07/2021    Procedure: INSERTION, CATHETER, RIGHT HEART;  Surgeon: Irving Cardenas MD;  Location: [facility] CATH LAB;  Service: Cardiology;  Laterality: Right;    RIGHT HEART CATHETERIZATION Right 12/19/2022    Procedure: INSERTION, CATHETER, RIGHT HEART;  Surgeon: Burke Camilo MD;  Location: [facility] CATH LAB;  Service: Cardiology;  Laterality: Right;    TRANSJUGULAR BIOPSY OF LIVER N/A 10/24/2018    Procedure: BIOPSY, LIVER, TRANSJUGULAR APPROACH;  [facility];  Location: [facility] OR [facility] FLR;  Service: Radiology;  Laterality: N/A;       Review of patient's allergies indicates:   Allergen Reactions    Penicillins Hives and Other (See Comments)    Iodinated contrast media Nausea And Vomiting    Oxycodone-acetaminophen Other (See Comments)     Nausea, Dizziness, Anxiety.  "I don't like how it makes me feel."   Given Hydromorphone 0.5mg IVP  Without problems.  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 Metabolism of other drugs not listed here may also be impacted by UGT1A1 enzyme activity.       Tramadol Nausea And Vomiting and Other (See Comments)     Other reaction(s): Other (See Comments)    Valsartan Other (See Comments)       Current Facility-Administered Medications   Medication    albuterol-ipratropium 2.5 mg-0.5 mg/3 mL nebulizer solution 3 mL    apixaban tablet 5 mg    atorvastatin tablet 40 mg    dextromethorphan-guaiFENesin  mg/5 ml " liquid 5 mL    dextrose 10% bolus 125 mL 125 mL    dextrose 10% bolus 250 mL 250 mL    furosemide injection 80 mg    glucagon (human recombinant) injection 1 mg    glucose chewable tablet 16 g    glucose chewable tablet 24 g    insulin aspart U-100 pen 0-5 Units    isosorbide dinitrate tablet 30 mg    loperamide capsule 2 mg    melatonin tablet 6 mg    metoprolol succinate (TOPROL-XL) 24 hr tablet 100 mg    naloxone 0.4 mg/mL injection 0.02 mg    nitroGLYCERIN SL tablet 1 mg    ondansetron disintegrating tablet 8 mg    prochlorperazine injection Soln 5 mg    rOPINIRole tablet 4 mg    sacubitriL-valsartan  mg per tablet 1 tablet    sodium chloride 0.9% flush 10 mL    sodium chloride 0.9% flush 10 mL    tiotropium bromide 2.5 mcg/actuation inhaler 2 puff     Facility-Administered Medications Ordered in Other Encounters   Medication    0.9%  NaCl infusion    vancomycin in dextrose 5 % 1 gram/250 mL IVPB 1,000 mg     Family History       Problem Relation (Age of Onset)    Cancer Mother    Cirrhosis Brother    Diabetes Brother    Heart attack Father    Heart disease Father, Sister, Brother, Sister, Brother    Hypertension Father, Brother          Tobacco Use    Smoking status: Never    Smokeless tobacco: Never   Substance and Sexual Activity    Alcohol use: Yes     Comment: up to 1 yr ago drank 2-3 drinks on occasion but sporadic    Drug use: No    Sexual activity: Yes     Partners: Male       Objective:     Vital Signs (Most Recent):  Temp: 98.6 °F (37 °C) (02/02/23 1125)  Pulse: 66 (02/02/23 1252)  Resp: 12 (02/02/23 1252)  BP: (!) 143/69 (02/02/23 0900)  SpO2: 96 % (02/02/23 1125)   Vital Signs (24h Range):  Temp:  [96.4 °F (35.8 °C)-98.6 °F (37 °C)] 98.6 °F (37 °C)  Pulse:  [56-80] 66  Resp:  [12-20] 12  SpO2:  [92 %-96 %] 96 %  BP: (106-143)/(55-75) 143/69     No data found.  Body mass index is 31.96 kg/m².    No intake or output data in the 24 hours ending 02/02/23 1513    Physical Exam  Constitutional:        Appearance: She is obese. She is not ill-appearing.   Cardiovascular:      Rate and Rhythm: Normal rate and regular rhythm.      Pulses: Normal pulses.      Comments: Elevated JVP up to mid-neck on exam on an upright position with positive Hepatojugular reflex  Pulmonary:      Effort: Pulmonary effort is normal.      Breath sounds: Normal breath sounds.   Abdominal:      General: Bowel sounds are normal.      Palpations: Abdomen is soft.      Tenderness: There is no abdominal tenderness.   Musculoskeletal:      Right lower leg: No edema.      Left lower leg: No edema.   Skin:     General: Skin is warm.   Neurological:      Mental Status: She is alert.       Significant Labs:  CBC:  Recent Labs   Lab 01/31/23 0432 02/01/23 0252 02/02/23 0221   WBC 3.87* 5.09 4.31   RBC 4.11 4.08 4.22   HGB 7.2* 7.2* 7.5*   HCT 24.9* 24.6* 25.8*   * 137* 141*   MCV 61* 60* 61*   MCH 17.5* 17.6* 17.8*   MCHC 28.9* 29.3* 29.1*     BNP:  Recent Labs   Lab 01/30/23  1004   BNP 2,470*     CMP:  Recent Labs   Lab 01/31/23 0432 02/01/23 0252 02/02/23 0221   GLU 62* 131* 95   CALCIUM 9.0 8.7 8.8   ALBUMIN 3.4* 3.3* 3.3*   PROT 5.8* 5.7* 5.7*    143 140   K 3.8 4.1 4.1   CO2 23 24 25    108 105   BUN 43* 41* 38*   CREATININE 2.1* 2.5* 1.9*   ALKPHOS 43* 46* 40*   ALT 17 16 14   AST 19 15 13   BILITOT 1.8* 1.4* 1.5*      I have reviewed all pertinent labs within the past 24 hours.

## 2023-02-02 NOTE — CONSULTS
[facility] - Med Surg  Heart Transplant  Consult Note    Patient Name: Hafsa Hawley  MRN: [id number]  Admission Date: 1/30/2023  Hospital Length of Stay: 3 days  Attending Physician: Andrea Rodrigues MD  Primary Care Provider: Cristobal Ann MD   Principal Problem:Chronic combined systolic and diastolic heart failure    Consults  Subjective:     History of Present Illness:  53 year old female with hx of NICM, HFrEF (EF: 10%) s/p ICD, Pulmonary Hypertension, DM, HTN, Atrial Fibrillation, who presented to the ED with shortness of breath and volume overload. Patient was admitted to hospital medicine for further management. Patient was diuresed than transitioned to home diuretic of lasix 80 BID on 2/1. Patient was evaluated today and still endorses dyspnea at rest with intermittent chest/neck pressure. However, was found sitting up and comfortable on RA. Of note patient was previously presented to committee on 3/9/2022, patient was declined for OHTx/LVAD due to renal function, lung function and difficulty following up in clinic. Was re-evaluated in clinic on 1/4 but patient was found to be ill in prior visit and was sent to the ED.      Home GDMT: Metoprolol succinate 100 mg daily, Entresto  mg BID, Jardiance, and Hydralazine 100 mg TID, Isordil 30 mg TID    RHC (12/19/2022)  RA: 18/ 13/ 13 RV: 78/ 17/ 18 PA: 78/ 40/ 52 PWP: 27/ 29/ 26 .   Cardiac output was 4.7  by Gillian. Cardiac index is 2.3 L/min/m2.   O2 Sat: PA 46%.   Pulmonary vascular resistance: 5.5 BOLDEN. Systemic vascular resistance: 1333 dynes/cm5.s.     Echocardiogram (11/7/2022)  The left ventricle is severely enlarged with eccentric hypertrophy and severely decreased systolic function. The estimated ejection fraction is 10%.  The right ventricle is not well visualized but appears normal in size with moderately reduced systolic function.  Grade II left ventricular diastolic dysfunction.  Biatrial enlargement.  The estimated PA systolic pressure is 65  mmHg.  Elevated central venous pressure (15 mmHg).  Small posterolateral pericardial effusion.  LVDD 7.27 cm    Past Medical History:   Diagnosis Date    Anemia     Arthritis     Atrial fibrillation     OAC    Chronic respiratory failure with hypoxia, on home oxygen therapy     2L with activity, off at rest.  Per Pulm  no overt evidence of ILD or COPD on PFTs and CT to explain O2 needs.    CKD (chronic kidney disease), stage IV 05/08/2018    Congestive heart failure     s/p AICD placement,    Deep vein thrombosis     Depression     elevated bilirubin d/t Gilbert's syndrome     confirmed by [place] genetic testing, evaluated by hepatology    Encounter for blood transfusion     GERD (gastroesophageal reflux disease)     Hypertension     Pheochromocytoma, malignant     Right kidney mass     Sleep apnea     Thalassemia trait, alpha     Thyroid disease     Type 2 diabetes mellitus with hyperglycemia, without long-term current use of insulin 08/13/2020       Past Surgical History:   Procedure Laterality Date    APPENDECTOMY      BONE MARROW BIOPSY      CARDIAC DEFIBRILLATOR PLACEMENT Left 12/2016    CARDIAC ELECTROPHYSIOLOGY MAPPING AND ABLATION      CARDIAC ELECTROPHYSIOLOGY MAPPING AND ABLATION      COLONOSCOPY N/A 5/6/2022    Procedure: COLONOSCOPY;  Surgeon: Arely Betancourt MD;  Location: [facility] ([address] [place] [address]);  Service: Endoscopy;  Laterality: N/A;  heart transplant candidate/ EF 25% - 2nd floor/ defib - Biotronik - ERW  Eliquis - per Dr. Cortez with [facility] [place], Pt ok to hold Eliquis x 2 days prior-see media tab-outside correspondence dated 12/30/21  - ERW  verbal instructions/portal instructions/email instructions - s    EYE SURGERY      due to running tears    FRACTURE SURGERY Left     hand 5th digit    HYSTERECTOMY      KNEE SURGERY Left 2016    hematoma    LIVER BIOPSY  10/24/2018    Minimal steatosis, predominantly macrovesicular, 1%, Minimal nonspecific portal inflammation, no fibrosis. No findings on biopsy to  "explain elevated bilirubin levels. Could be d/t Gilbert's =?- hemolysis    RIGHT HEART CATHETERIZATION Right 12/07/2021    Procedure: INSERTION, CATHETER, RIGHT HEART;  Surgeon: Irving Cardenas MD;  Location: [facility] CATH LAB;  Service: Cardiology;  Laterality: Right;    RIGHT HEART CATHETERIZATION Right 12/19/2022    Procedure: INSERTION, CATHETER, RIGHT HEART;  Surgeon: Burke Camilo MD;  Location: [facility] CATH LAB;  Service: Cardiology;  Laterality: Right;    TRANSJUGULAR BIOPSY OF LIVER N/A 10/24/2018    Procedure: BIOPSY, LIVER, TRANSJUGULAR APPROACH;  Surgeon: [facility] Provider;  Location: [facility] OR [address] [place] [address];  Service: Radiology;  Laterality: N/A;       Review of patient's allergies indicates:   Allergen Reactions    Penicillins Hives and Other (See Comments)    Iodinated contrast media Nausea And Vomiting    Oxycodone-acetaminophen Other (See Comments)     Nausea, Dizziness, Anxiety.  "I don't like how it makes me feel."   Given Hydromorphone 0.5mg IVP  Without problems.  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 Metabolism of other drugs not listed here may also be impacted by UGT1A1 enzyme activity.       Tramadol Nausea And Vomiting and Other (See Comments)     Other reaction(s): Other (See Comments)    Valsartan Other (See Comments)       Current Facility-Administered Medications   Medication    albuterol-ipratropium 2.5 mg-0.5 mg/3 mL nebulizer " solution 3 mL    apixaban tablet 5 mg    atorvastatin tablet 40 mg    dextromethorphan-guaiFENesin  mg/5 ml liquid 5 mL    dextrose 10% bolus 125 mL 125 mL    dextrose 10% bolus 250 mL 250 mL    furosemide injection 80 mg    glucagon (human recombinant) injection 1 mg    glucose chewable tablet 16 g    glucose chewable tablet 24 g    insulin aspart U-100 pen 0-5 Units    isosorbide dinitrate tablet 30 mg    loperamide capsule 2 mg    melatonin tablet 6 mg    metoprolol succinate (TOPROL-XL) 24 hr tablet 100 mg    naloxone 0.4 mg/mL injection 0.02 mg    nitroGLYCERIN SL tablet 1 mg    ondansetron disintegrating tablet 8 mg    prochlorperazine injection Soln 5 mg    rOPINIRole tablet 4 mg    sacubitriL-valsartan  mg per tablet 1 tablet    sodium chloride 0.9% flush 10 mL    sodium chloride 0.9% flush 10 mL    tiotropium bromide 2.5 mcg/actuation inhaler 2 puff     Facility-Administered Medications Ordered in Other Encounters   Medication    0.9%  NaCl infusion    vancomycin in dextrose 5 % 1 gram/250 mL IVPB 1,000 mg     Family History       Problem Relation (Age of Onset)    Cancer Mother    Cirrhosis Brother    Diabetes Brother    Heart attack Father    Heart disease Father, Sister, Brother, Sister, Brother    Hypertension Father, Brother          Tobacco Use    Smoking status: Never    Smokeless tobacco: Never   Substance and Sexual Activity    Alcohol use: Yes     Comment: up to 1 yr ago drank 2-3 drinks on occasion but sporadic    Drug use: No    Sexual activity: Yes     Partners: Male       Objective:     Vital Signs (Most Recent):  Temp: 98.6 °F (37 °C) (02/02/23 1125)  Pulse: 66 (02/02/23 1252)  Resp: 12 (02/02/23 1252)  BP: (!) 143/69 (02/02/23 0900)  SpO2: 96 % (02/02/23 1125)   Vital Signs (24h Range):  Temp:  [96.4 °F (35.8 °C)-98.6 °F (37 °C)] 98.6 °F (37 °C)  Pulse:  [56-80] 66  Resp:  [12-20] 12  SpO2:  [92 %-96 %] 96 %  BP: (106-143)/(55-75) 143/69     No data found.  Body mass index is  31.96 kg/m².    No intake or output data in the 24 hours ending 02/02/23 1513    Physical Exam  Constitutional:       Appearance: She is obese. She is not ill-appearing.   Cardiovascular:      Rate and Rhythm: Normal rate and regular rhythm.      Pulses: Normal pulses.      Comments: Elevated JVP up to mid-neck on exam on an upright position with positive Hepatojugular reflex  Pulmonary:      Effort: Pulmonary effort is normal.      Breath sounds: Normal breath sounds.   Abdominal:      General: Bowel sounds are normal.      Palpations: Abdomen is soft.      Tenderness: There is no abdominal tenderness.   Musculoskeletal:      Right lower leg: No edema.      Left lower leg: No edema.   Skin:     General: Skin is warm.   Neurological:      Mental Status: She is alert.       Significant Labs:  CBC:  Recent Labs   Lab 01/31/23 0432 02/01/23 0252 02/02/23 0221   WBC 3.87* 5.09 4.31   RBC 4.11 4.08 4.22   HGB 7.2* 7.2* 7.5*   HCT 24.9* 24.6* 25.8*   * 137* 141*   MCV 61* 60* 61*   MCH 17.5* 17.6* 17.8*   MCHC 28.9* 29.3* 29.1*     BNP:  Recent Labs   Lab 01/30/23  1004   BNP 2,470*     CMP:  Recent Labs   Lab 01/31/23 0432 02/01/23 0252 02/02/23 0221   GLU 62* 131* 95   CALCIUM 9.0 8.7 8.8   ALBUMIN 3.4* 3.3* 3.3*   PROT 5.8* 5.7* 5.7*    143 140   K 3.8 4.1 4.1   CO2 23 24 25    108 105   BUN 43* 41* 38*   CREATININE 2.1* 2.5* 1.9*   ALKPHOS 43* 46* 40*   ALT 17 16 14   AST 19 15 13   BILITOT 1.8* 1.4* 1.5*      I have reviewed all pertinent labs within the past 24 hours.    Assessment/Plan:   53 year old female with hx of NICM, HFrEF (EF: 10%) s/p ICD, Pulmonary Hypertension, DM, HTN, Atrial Fibrillation, thyroid disorder, admitted for ADHF.    Recommendations:  - Patient still appears to be hypervolemic on exam. Please continue diuresis, IV Lasix 80 mg q8h  - Please monitor electrolytes closely q12h. Maintain Mg >2 and K>.4  - Agree with medical management. Continue with GDMT  - Continue:  Toprol 100 mg, daily; Entresto  mg, BID; Isosorbide 30 mg, TID and please resume home Hydralazine.   - Continue HF Pathway: Sodium restriction <2 g, Fluid restriction < 1500 mL, Strict I/Os, Daily weights  - Please ensure there is documentation of patients I/O's. Appear to be inaccurate.   - Will continue evaluation outpatient in clinic. Appreciate hospital medicine care of this patient.      Thank you for your consult. Please call [phone] if any questions. Will continue to follow     Zhane Mckeon MD  Heart Transplant  [facility] - Med Surg

## 2023-02-03 VITALS
OXYGEN SATURATION: 93 % | RESPIRATION RATE: 18 BRPM | HEIGHT: 66 IN | SYSTOLIC BLOOD PRESSURE: 109 MMHG | WEIGHT: 196.56 LBS | BODY MASS INDEX: 31.59 KG/M2 | TEMPERATURE: 98 F | DIASTOLIC BLOOD PRESSURE: 68 MMHG | HEART RATE: 65 BPM

## 2023-02-03 LAB
ANION GAP SERPL CALC-SCNC: 11 MMOL/L (ref 8–16)
BUN SERPL-MCNC: 34 MG/DL (ref 6–20)
CALCIUM SERPL-MCNC: 9.4 MG/DL (ref 8.7–10.5)
CHLORIDE SERPL-SCNC: 101 MMOL/L (ref 95–110)
CO2 SERPL-SCNC: 28 MMOL/L (ref 23–29)
CREAT SERPL-MCNC: 1.7 MG/DL (ref 0.5–1.4)
EST. GFR  (NO RACE VARIABLE): 34.8 ML/MIN/1.73 M^2
GLUCOSE SERPL-MCNC: 131 MG/DL (ref 70–110)
MAGNESIUM SERPL-MCNC: 1.9 MG/DL (ref 1.6–2.6)
POTASSIUM SERPL-SCNC: 3.4 MMOL/L (ref 3.5–5.1)
SODIUM SERPL-SCNC: 140 MMOL/L (ref 136–145)

## 2023-02-03 PROCEDURE — 99238 PR HOSPITAL DISCHARGE DAY,<30 MIN: ICD-10-PCS | Mod: NTX,,, | Performed by: HOSPITALIST

## 2023-02-03 PROCEDURE — 25000003 PHARM REV CODE 250: Mod: NTX

## 2023-02-03 PROCEDURE — 25000003 PHARM REV CODE 250: Mod: NTX | Performed by: STUDENT IN AN ORGANIZED HEALTH CARE EDUCATION/TRAINING PROGRAM

## 2023-02-03 PROCEDURE — 25000242 PHARM REV CODE 250 ALT 637 W/ HCPCS: Mod: NTX

## 2023-02-03 PROCEDURE — 80048 BASIC METABOLIC PNL TOTAL CA: CPT | Mod: NTX | Performed by: HOSPITALIST

## 2023-02-03 PROCEDURE — 99900035 HC TECH TIME PER 15 MIN (STAT): Mod: NTX

## 2023-02-03 PROCEDURE — 36415 COLL VENOUS BLD VENIPUNCTURE: CPT | Mod: NTX | Performed by: HOSPITALIST

## 2023-02-03 PROCEDURE — 94640 AIRWAY INHALATION TREATMENT: CPT | Mod: NTX

## 2023-02-03 PROCEDURE — 63600175 PHARM REV CODE 636 W HCPCS: Mod: NTX | Performed by: STUDENT IN AN ORGANIZED HEALTH CARE EDUCATION/TRAINING PROGRAM

## 2023-02-03 PROCEDURE — 99238 HOSP IP/OBS DSCHRG MGMT 30/<: CPT | Mod: NTX,,, | Performed by: HOSPITALIST

## 2023-02-03 PROCEDURE — 83735 ASSAY OF MAGNESIUM: CPT | Mod: NTX | Performed by: HOSPITALIST

## 2023-02-03 RX ORDER — LIDOCAINE 50 MG/G
1 PATCH TOPICAL DAILY PRN
Start: 2023-02-03 | End: 2023-04-10 | Stop reason: SDUPTHER

## 2023-02-03 RX ORDER — MONTELUKAST SODIUM 10 MG/1
10 TABLET ORAL DAILY
Status: DISCONTINUED | OUTPATIENT
Start: 2023-02-03 | End: 2023-02-03 | Stop reason: HOSPADM

## 2023-02-03 RX ORDER — POTASSIUM CHLORIDE 20 MEQ/1
40 TABLET, EXTENDED RELEASE ORAL
Status: DISCONTINUED | OUTPATIENT
Start: 2023-02-03 | End: 2023-02-03

## 2023-02-03 RX ORDER — ALPRAZOLAM 0.25 MG/1
0.25 TABLET ORAL 2 TIMES DAILY PRN
Status: DISCONTINUED | OUTPATIENT
Start: 2023-02-03 | End: 2023-02-03 | Stop reason: HOSPADM

## 2023-02-03 RX ORDER — DULOXETIN HYDROCHLORIDE 30 MG/1
30 CAPSULE, DELAYED RELEASE ORAL DAILY
Status: DISCONTINUED | OUTPATIENT
Start: 2023-02-03 | End: 2023-02-03 | Stop reason: HOSPADM

## 2023-02-03 RX ORDER — GLIMEPIRIDE 2 MG/1
1 TABLET ORAL DAILY
Qty: 30 TABLET | Refills: 2 | Status: SHIPPED | OUTPATIENT
Start: 2023-02-03 | End: 2023-02-24

## 2023-02-03 RX ORDER — ALLOPURINOL 100 MG/1
100 TABLET ORAL DAILY PRN
Status: ON HOLD
Start: 2023-02-03 | End: 2023-06-30 | Stop reason: HOSPADM

## 2023-02-03 RX ORDER — FLUTICASONE FUROATE AND VILANTEROL 100; 25 UG/1; UG/1
1 POWDER RESPIRATORY (INHALATION) DAILY
Status: DISCONTINUED | OUTPATIENT
Start: 2023-02-03 | End: 2023-02-03 | Stop reason: HOSPADM

## 2023-02-03 RX ORDER — ONDANSETRON 4 MG/1
4 TABLET, ORALLY DISINTEGRATING ORAL EVERY 12 HOURS PRN
Start: 2023-02-03 | End: 2023-02-27 | Stop reason: SDUPTHER

## 2023-02-03 RX ORDER — PANTOPRAZOLE SODIUM 40 MG/1
40 TABLET, DELAYED RELEASE ORAL DAILY
Status: DISCONTINUED | OUTPATIENT
Start: 2023-02-03 | End: 2023-02-03 | Stop reason: HOSPADM

## 2023-02-03 RX ORDER — POTASSIUM CHLORIDE 20 MEQ/1
40 TABLET, EXTENDED RELEASE ORAL
Status: DISCONTINUED | OUTPATIENT
Start: 2023-02-03 | End: 2023-02-03 | Stop reason: HOSPADM

## 2023-02-03 RX ADMIN — TIOTROPIUM BROMIDE INHALATION SPRAY 2 PUFF: 3.12 SPRAY, METERED RESPIRATORY (INHALATION) at 07:02

## 2023-02-03 RX ADMIN — ISOSORBIDE DINITRATE 30 MG: 30 TABLET ORAL at 09:02

## 2023-02-03 RX ADMIN — PANTOPRAZOLE SODIUM 40 MG: 40 TABLET, DELAYED RELEASE ORAL at 11:02

## 2023-02-03 RX ADMIN — METOPROLOL SUCCINATE 100 MG: 100 TABLET, EXTENDED RELEASE ORAL at 09:02

## 2023-02-03 RX ADMIN — FUROSEMIDE 80 MG: 10 INJECTION, SOLUTION INTRAMUSCULAR; INTRAVENOUS at 05:02

## 2023-02-03 RX ADMIN — DULOXETIN HYDROCHLORIDE 30 MG: 30 CAPSULE, DELAYED RELEASE ORAL at 11:02

## 2023-02-03 RX ADMIN — SACUBITRIL AND VALSARTAN 1 TABLET: 97; 103 TABLET, FILM COATED ORAL at 09:02

## 2023-02-03 RX ADMIN — APIXABAN 5 MG: 5 TABLET, FILM COATED ORAL at 09:02

## 2023-02-03 RX ADMIN — IPRATROPIUM BROMIDE AND ALBUTEROL SULFATE 3 ML: 2.5; .5 SOLUTION RESPIRATORY (INHALATION) at 07:02

## 2023-02-03 RX ADMIN — ROPINIROLE 4 MG: 2 TABLET, FILM COATED ORAL at 09:02

## 2023-02-03 RX ADMIN — MONTELUKAST 10 MG: 10 TABLET, FILM COATED ORAL at 11:02

## 2023-02-03 RX ADMIN — POTASSIUM CHLORIDE 40 MEQ: 1500 TABLET, EXTENDED RELEASE ORAL at 12:02

## 2023-02-03 NOTE — NURSING
Patient refused to wait to receive both doses of potassium before d/c. She had to leave due to transportation issues. Will notify team.     PIV removed, 1 dose of oral potassium given, and AVS explained and given. Patient left via hospital staff

## 2023-02-03 NOTE — DISCHARGE SUMMARY
[facility] Medicine  Discharge Summary      Patient Name: Hafsa Hawley  MRN: [id number]  GLENN: [id number]  Patient Class: IP- Inpatient  Admission Date: 1/30/2023  Hospital Length of Stay: 4 days  Discharge Date and Time:  02/03/2023 8:30 AM  Attending Physician: Andrea Rodrigues MD   Discharging Provider: Hattie Dallas DO  Primary Care Provider: Cristobal Ann MD  Hospital Medicine Team: [facility] Hattie Dallas DO  Primary Care Team: [facility] 1    HPI:   Mrs. Hafsa Hawley is a 57 y.o Female with a PMHx of NICM HFrEF (EF 10%) s/p AICD placement, Paroxysmal A Fib on Eliquis, Pulmonary Hypertension, Hypertension, Type 2 Diabetes Mellitus presenting with shortness of breath. She states that her dyspnea started a few days ago which she has been attempting to manage at home, but has had progressive worsening over the course of today while she was walking from her car to her Cardiologist appointment this morning. Her dyspnea was accompanied by subsequent nausea with multiple episodes of vomiting, after which she was prompted to defer to the Emergency Department. She additionally endorses intermittent chest tightness that has been ongoing for the last month, located from neck to sternum. She endorses decreased activity tolerance, chills, dizziness with headaches. Additionally notes sore throat and cough present for one year. She does not use oxygen at home.    In the Emergency Department, she was afebrile and hypertensive 155/96, hypoxic 88% on room air that improved to 92% on 2L NC. Initial CBC were remarkable for Hgb 8.0, baseline ~8.5. No leukocytosis. Initial CMP was remarkable for Creatinine 2.3, baseline approximately 1.8. BNP 2470, elevated above baseline. Troponin 0.623, below baseline 0.76. Chest X Ray was concerning for pulmonary edema and dense left basilar opacity. She was given Lasix 80 mg IV x 1 in the Emergency Department. She was admitted to Hospital Medicine for  "further evaluation, management, and coordination of care.      * No surgery found *      Hospital Course:   Admitted to Hospital Medicine with acute on chronic heart failure exacerbation. Found to have elevated troponin on admission, however below her baseline. She was started on Lasix 80 mg IV BID with improvement in symptoms. HTS to follow, consulted for initiation of inpatient evaluation vs outpatient evaluation. HTS to continue evaluation in outpatient setting per patient wishes. Transitioning to home dose of Lasix. Medically stable for discharge home.       Goals of Care Treatment Preferences:  Code Status: Full Code    /67   Pulse 88   Temp 97.3 °F (36.3 °C)   Resp 20   Ht 5' 6" (1.676 m)   Wt 89.8 kg (198 lb)   LMP  (LMP Unknown)   SpO2 96%   Breastfeeding No   BMI 31.96 kg/m²     Physical Exam  Vitals and nursing note reviewed.   Constitutional:       General: She is awake. She is not in acute distress.     Appearance: She is obese. She is not ill-appearing, toxic-appearing or diaphoretic.   HENT:      Head: Normocephalic and atraumatic.      Nose: Nose normal. No congestion or rhinorrhea.      Mouth/Throat:      Mouth: Mucous membranes are moist.      Pharynx: No oropharyngeal exudate or posterior oropharyngeal erythema.   Eyes:      General: No scleral icterus.        Right eye: No discharge.         Left eye: No discharge.      Extraocular Movements: Extraocular movements intact.      Pupils: Pupils are equal, round, and reactive to light.   Neck:      Vascular: No JVD.   Cardiovascular:      Rate and Rhythm: Normal rate and regular rhythm.      Pulses: Normal pulses.      Heart sounds: No murmur heard.    No friction rub.   Pulmonary:      Effort: Pulmonary effort is normal. No respiratory distress.      Breath sounds: No wheezing, rhonchi or rales.   Chest:      Chest wall: No tenderness.   Abdominal:      General: Bowel sounds are normal. There is no distension.      Palpations: Abdomen " is soft.      Tenderness: There is no abdominal tenderness. There is no guarding or rebound.   Musculoskeletal:         General: No swelling or tenderness.      Cervical back: Normal range of motion. No rigidity.      Right lower leg: No edema.      Left lower leg: No edema.   Skin:     General: Skin is warm and dry.      Capillary Refill: Capillary refill takes less than 2 seconds.      Findings: No erythema or rash.   Neurological:      General: No focal deficit present.      Mental Status: She is alert and oriented to person, place, and time.      Sensory: No sensory deficit.      Motor: No weakness.   Psychiatric:         Mood and Affect: Mood normal.         Behavior: Behavior normal. Behavior is cooperative.         Thought Content: Thought content normal.     Consults:   Consults (From admission, onward)          Status Ordering Provider     Inpatient consult to Heart Transplant  Once        Provider:  (Not yet assigned)    Acknowledged MONTRELL DAWSON     Inpatient consult to Social Work/Case Management  Once        Provider:  (Not yet assigned)    Acknowledged VALENTIN LYNN     Inpatient consult to Registered Dietitian/Nutritionist  Once        Provider:  (Not yet assigned)    Completed VALENTIN LYNN            No new Assessment & Plan notes have been filed under this hospital service since the last note was generated.  Service: Hospital Medicine    Final Active Diagnoses:    Diagnosis Date Noted POA    PRINCIPAL PROBLEM:  Acute on chronic combined systolic and diastolic heart failure [I50.42] 03/16/2018 Yes     Chronic    Demand ischemia [I24.8] 01/04/2023 Yes    Acute on chronic respiratory failure with hypoxia [J96.21] 08/05/2021 Yes    Type 2 diabetes mellitus without complication, without long-term current use of insulin [E11.9] 08/13/2020 Yes     Chronic    CKD (chronic kidney disease), stage IV [N18.4] 05/08/2018 Yes     Chronic    Paroxysmal atrial fibrillation [I48.0] 08/25/2016 Yes     Chronic     MELISSA (obstructive sleep apnea) [G47.33] 08/23/2016 Yes     Chronic    Essential hypertension [I10] 07/22/2016 Yes     Chronic      Problems Resolved During this Admission:       Discharged Condition: good    Disposition: Home or Self Care    Follow Up:    Patient Instructions:      Ambulatory referral/consult to Transplant, Heart   Standing Status: Future   Referral Priority: Routine Referral Type: Transplants   Number of Visits Requested: 1     Notify your health care provider if you experience any of the following:  increased confusion or weakness     Notify your health care provider if you experience any of the following:  persistent dizziness, light-headedness, or visual disturbances     Notify your health care provider if you experience any of the following:  worsening rash     Notify your health care provider if you experience any of the following:  severe persistent headache     Notify your health care provider if you experience any of the following:  difficulty breathing or increased cough     Notify your health care provider if you experience any of the following:  redness, tenderness, or signs of infection (pain, swelling, redness, odor or green/yellow discharge around incision site)     Notify your health care provider if you experience any of the following:  severe uncontrolled pain     Notify your health care provider if you experience any of the following:  persistent nausea and vomiting or diarrhea     Notify your health care provider if you experience any of the following:  temperature >100.4     Activity as tolerated       Significant Diagnostic Studies: Labs:   CMP   Recent Labs   Lab 02/02/23 0221      K 4.1      CO2 25   GLU 95   BUN 38*   CREATININE 1.9*   CALCIUM 8.8   PROT 5.7*   ALBUMIN 3.3*   BILITOT 1.5*   ALKPHOS 40*   AST 13   ALT 14   ANIONGAP 10    and CBC   Recent Labs   Lab 02/02/23 0221   WBC 4.31   HGB 7.5*   HCT 25.8*   *       Pending Diagnostic Studies:        None           Medications:  Reconciled Home Medications:      Medication List        CHANGE how you take these medications      ergocalciferol 50,000 unit Cap  Commonly known as: ERGOCALCIFEROL  Take 1 capsule (50,000 Units total) by mouth every 7 days.  What changed: when to take this     ferrous sulfate 325 (65 FE) MG EC tablet  Take 1 tablet (325 mg total) by mouth once daily.  What changed: when to take this     glimepiride 2 MG tablet  Commonly known as: AMARYL  Take 0.5 tablets (1 mg total) by mouth once daily.  What changed:   how much to take  when to take this     isosorbide dinitrate 30 MG Tab  Commonly known as: ISORDIL  Take 1 tablet (30 mg total) by mouth 3 (three) times daily.  What changed: when to take this            CONTINUE taking these medications      acetaminophen 500 MG tablet  Commonly known as: TYLENOL  Take 1,000 mg by mouth 2 (two) times daily as needed for Pain.     albuterol-ipratropium 2.5 mg-0.5 mg/3 mL nebulizer solution  Commonly known as: DUO-NEB  Take 3 mLs by nebulization 2 (two) times a day.     allopurinoL 100 MG tablet  Commonly known as: ZYLOPRIM  Take 1 tablet (100 mg total) by mouth daily as needed (gout attack).     ALPRAZolam 2 MG Tab  Commonly known as: XANAX  Take 0.25-2 mg by mouth 2 (two) times daily as needed (anxiety).     apixaban 5 mg Tab  Commonly known as: ELIQUIS  Take 1 tablet (5 mg total) by mouth 2 (two) times daily.     atorvastatin 40 MG tablet  Commonly known as: LIPITOR  Take 1 tablet (40 mg total) by mouth every evening.     b complex vitamins tablet  Take 1 tablet by mouth once daily.     blood sugar diagnostic Strp  Commonly known as: ACCU-CHEK GUIDE TEST STRIPS  1 strip by Other route 3 (three) times daily.     cetirizine 10 MG tablet  Commonly known as: ZYRTEC  Take 10 mg by mouth daily as needed for Allergies.     diclofenac sodium 1 % Gel  Commonly known as: VOLTAREN  APPLY 2 G TOPICALLY 3 (THREE) TIMES DAILY AS NEEDED (PAIN).     DULERA  200-5 mcg/actuation inhaler  Generic drug: mometasone-formoterol  Inhale 2 puffs into the lungs 2 (two) times daily. Controller     DULoxetine 30 MG capsule  Commonly known as: CYMBALTA  Take 1 capsule orally once a day.     ENTRESTO  mg per tablet  Generic drug: sacubitriL-valsartan  Take 1 tablet by mouth 2 (two) times daily.     fluticasone propionate 50 mcg/actuation nasal spray  Commonly known as: FLONASE  2 sprays by Each Nostril route daily as needed for Rhinitis.     furosemide 80 MG tablet  Commonly known as: LASIX  Take 1 tablet (80 mg total) by mouth 2 (two) times daily.     JARDIANCE 25 mg tablet  Generic drug: empagliflozin  Take 25 mg by mouth once daily.     lancets Misc  Commonly known as: ACCU-CHEK SOFTCLIX LANCETS  1 Device by Misc.(Non-Drug; Combo Route) route 3 (three) times daily.     LIDOcaine 5 %  Commonly known as: LIDODERM  Place 1 patch onto the skin daily as needed (pain). Remove & Discard patch within 12 hours or as directed by MD     metoprolol succinate 50 MG 24 hr tablet  Commonly known as: TOPROL-XL  Take 1 tablet (50 mg total) by mouth once daily.     montelukast 10 mg tablet  Commonly known as: SINGULAIR  Take 1 tablet (10 mg total) by mouth once daily.     NITROSTAT 0.4 MG SL tablet  Generic drug: nitroGLYCERIN  Take 2.5 tablets (1 mg total) by mouth every 5 (five) minutes as needed for Chest pain. No more than 3 tablets in 15 minutes.     ondansetron 4 MG Tbdl  Commonly known as: ZOFRAN-ODT  Take 1 tablet (4 mg total) by mouth every 12 (twelve) hours as needed (nausea).     pantoprazole 40 MG tablet  Commonly known as: PROTONIX  TAKE 1 TABLET BY MOUTH DAILY IN THE MORNING     potassium chloride 10 MEQ Cpsr  Commonly known as: MICRO-K  Take 1 capsule (10 mEq total) by mouth once daily.     promethazine-codeine 6.25-10 mg/5 ml 6.25-10 mg/5 mL syrup  Commonly known as: PHENERGAN with CODEINE  Take 5 ml by mouth every 4-6 hours as needed.     rOPINIRole 4 MG tablet  Commonly  known as: REQUIP  Take 1 tablet (4 mg total) by mouth once daily. Pt taking 4mg daily     scopolamine 1.3-1.5 mg (1 mg over 3 days)  Commonly known as: TRANSDERM-SCOP  Place 1 patch onto the skin every 72 hours as needed (nausea).     SPIRIVA WITH HANDIHALER 18 mcg inhalation capsule  Generic drug: tiotropium  INHALE 1 CAPSULE INTO THE LUNGS ONCE DAILY.     VENTOLIN HFA 90 mcg/actuation inhaler  Generic drug: albuterol  Inhale 2 puffs into the lungs every 6 (six) hours as needed for Shortness of Breath or Wheezing.            STOP taking these medications      amiodarone 200 MG Tab  Commonly known as: PACERONE     hydrALAZINE 100 MG tablet  Commonly known as: APRESOLINE              Indwelling Lines/Drains at time of discharge:   Lines/Drains/Airways       None                   Time spent on the discharge of patient: 35 minutes         Hattie Dallas DO  Department of Hospital Medicine  [facility]

## 2023-02-03 NOTE — PLAN OF CARE
APPOINTMENT:    Patient Appointment(s) scheduled with Cristobal Ann MD, on  Friday Feb 10, 2023 1:00 PM
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
## 2023-02-03 NOTE — PLAN OF CARE
Problem: Adult Inpatient Plan of Care  Goal: Plan of Care Review  2/3/2023 0921 by Carlos Tovar RN  Outcome: Ongoing, Progressing  2/3/2023 0920 by Carlos Tovar RN  Outcome: Ongoing, Progressing  Goal: Patient-Specific Goal (Individualized)  2/3/2023 0921 by Carlos Tovar RN  Outcome: Ongoing, Progressing  2/3/2023 0920 by Carlos Tovar RN  Outcome: Ongoing, Progressing  Goal: Absence of Hospital-Acquired Illness or Injury  2/3/2023 0921 by Carlos Tovar RN  Outcome: Ongoing, Progressing  2/3/2023 0920 by Carlos Tovar RN  Outcome: Ongoing, Progressing  Goal: Optimal Comfort and Wellbeing  2/3/2023 0921 by Carlos Tovar RN  Outcome: Ongoing, Progressing  2/3/2023 0920 by Carlos Tovar RN  Outcome: Ongoing, Progressing  Goal: Readiness for Transition of Care  2/3/2023 0921 by Carlos Tovar RN  Outcome: Ongoing, Progressing  2/3/2023 0920 by Carlos Tovar RN  Outcome: Ongoing, Progressing     Problem: Diabetes Comorbidity  Goal: Blood Glucose Level Within Targeted Range  2/3/2023 0921 by Carlos Tovar RN  Outcome: Ongoing, Progressing  2/3/2023 0920 by Carlos Tovar RN  Outcome: Ongoing, Progressing     Problem: Fluid and Electrolyte Imbalance (Acute Kidney Injury/Impairment)  Goal: Fluid and Electrolyte Balance  2/3/2023 0921 by Carlos Tovar RN  Outcome: Ongoing, Progressing  2/3/2023 0920 by Carlos Tovar RN  Outcome: Ongoing, Progressing     Problem: Oral Intake Inadequate (Acute Kidney Injury/Impairment)  Goal: Optimal Nutrition Intake  2/3/2023 0921 by Carlos Tovar RN  Outcome: Ongoing, Progressing  2/3/2023 0920 by Carlos Tovar RN  Outcome: Ongoing, Progressing     Problem: Renal Function Impairment (Acute Kidney Injury/Impairment)  Goal: Effective Renal Function  2/3/2023 0921 by Carlos Tovar RN  Outcome: Ongoing, Progressing  2/3/2023 0920 by Carlos Tovar, RN  Outcome: Ongoing,  Progressing     Problem: Fall Injury Risk  Goal: Absence of Fall and Fall-Related Injury  2/3/2023 0921 by Carlos Tovar RN  Outcome: Ongoing, Progressing  2/3/2023 0920 by Carlos Tovar RN  Outcome: Ongoing, Progressing     Problem: Pain Acute  Goal: Acceptable Pain Control and Functional Ability  Outcome: Ongoing, Progressing

## 2023-02-03 NOTE — PLAN OF CARE
[facility] - Med Surg  Discharge Final Note    Primary Care Provider: Cristobal Ann MD    Expected Discharge Date: 2/3/2023    Future Appointments   Date Time Provider Department Center   2/10/2023  1:00 PM Cristobal Ann MD [facility] FAM MED [place]   2/16/2023 11:30 AM Jake Orozco Jr., MD [facility] [address]TTX [address]   2/24/2023  9:30 AM Uziel Herman MD [facility] ENDOCR [place]   2/27/2023 10:00 AM Sandra Hernandes MD [facility] PAL MED [address]   4/11/2023  1:15 PM Cyndee Mattson MD [facility] PULM [place]   4/20/2023  8:45 AM Cristobal Ann MD [facility] MED [place]   4/26/2023 10:00 AM HOME MONITOR DEVICE CHECK, [facility] ARRHPRO [facility]        Final Discharge Note (most recent)       Final Note - 02/03/23 0948          Final Note    Assessment Type Final Discharge Note     Anticipated Discharge Disposition Home or Self Care     What phone number can be called within the next 1-3 days to see how you are doing after discharge? [phone]     Hospital Resources/Appts/Education Provided Appointments scheduled and added to AVS        Post-Acute Status    Post-Acute Authorization Other     Other Status No Post-Acute Service Needs     Discharge Delays None known at this time                     Important Message from Medicare             Contact Info       [id number]   Specialty: Transplant    [address]  [place] [address]   Phone: [phone]       Next Steps: Schedule an appointment as soon as possible for a visit in 1 week(s)    Cristobal Ann MD   Specialty: Family Medicine   Relationship: PCP - General    [address] [facility] [address]   Phone: [phone]       Next Steps: Follow up in 1 week(s)

## 2023-02-04 LAB
POCT GLUCOSE: 141 MG/DL (ref 70–110)
POCT GLUCOSE: 176 MG/DL (ref 70–110)

## 2023-02-06 ENCOUNTER — PATIENT MESSAGE (OUTPATIENT)
Dept: ADMINISTRATIVE | Facility: CLINIC | Age: 58
End: 2023-02-06
Payer: MEDICAID

## 2023-02-06 ENCOUNTER — PATIENT OUTREACH (OUTPATIENT)
Dept: ADMINISTRATIVE | Facility: CLINIC | Age: 58
End: 2023-02-06
Payer: MEDICAID

## 2023-02-06 NOTE — PROGRESS NOTES
C3 nurse 2nd attempt to contact Hafsa Hawley for a TCC post hospital discharge follow up call. No answer. No voicemail available. The patient has a scheduled HOSFU appointment with Cristobal Ann MD on 2/10/23 @ 1300.

## 2023-02-06 NOTE — PROGRESS NOTES
C3 nurse attempted to contact Hafsa Hawley for a TCC post hospital discharge follow up call. The patient is unable to conduct the call @ this time. The patient requested a callback.    The patient has a scheduled HOS appointment with Cristobal Ann MD on 2/10/23 @ 1300. Message sent to Physician staff.

## 2023-02-07 NOTE — PROGRESS NOTES
C3 nurse 3rd attempt to contact Hafsa Hawley for a TCC post hospital discharge follow up call. The patient is unable to conduct the call @ this time. The patient requested a callback.    The patient has a scheduled HOSFU appointment with Cristobal Ann MD on 2/10/23 @ 1300.

## 2023-02-10 ENCOUNTER — OFFICE VISIT (OUTPATIENT)
Dept: FAMILY MEDICINE | Facility: CLINIC | Age: 58
End: 2023-02-10
Payer: MEDICAID

## 2023-02-10 VITALS
HEART RATE: 64 BPM | SYSTOLIC BLOOD PRESSURE: 102 MMHG | HEIGHT: 66 IN | WEIGHT: 199.63 LBS | DIASTOLIC BLOOD PRESSURE: 70 MMHG | RESPIRATION RATE: 16 BRPM | BODY MASS INDEX: 32.08 KG/M2

## 2023-02-10 DIAGNOSIS — J96.11 CHRONIC RESPIRATORY FAILURE WITH HYPOXIA AND HYPERCAPNIA: ICD-10-CM

## 2023-02-10 DIAGNOSIS — J96.12 CHRONIC RESPIRATORY FAILURE WITH HYPOXIA AND HYPERCAPNIA: ICD-10-CM

## 2023-02-10 DIAGNOSIS — E11.9 TYPE 2 DIABETES MELLITUS WITHOUT COMPLICATION, WITHOUT LONG-TERM CURRENT USE OF INSULIN: Chronic | ICD-10-CM

## 2023-02-10 DIAGNOSIS — I50.42 CHRONIC COMBINED SYSTOLIC AND DIASTOLIC HEART FAILURE: ICD-10-CM

## 2023-02-10 DIAGNOSIS — M79.672 CHRONIC FOOT PAIN, LEFT: ICD-10-CM

## 2023-02-10 DIAGNOSIS — Z09 HOSPITAL DISCHARGE FOLLOW-UP: Primary | ICD-10-CM

## 2023-02-10 DIAGNOSIS — G89.29 CHRONIC FOOT PAIN, LEFT: ICD-10-CM

## 2023-02-10 DIAGNOSIS — G62.9 NEUROPATHY: ICD-10-CM

## 2023-02-10 DIAGNOSIS — J98.4 RESTRICTIVE LUNG DISEASE: ICD-10-CM

## 2023-02-10 PROCEDURE — 99999 PR PBB SHADOW E&M-EST. PATIENT-LVL V: CPT | Mod: PBBFAC,,, | Performed by: STUDENT IN AN ORGANIZED HEALTH CARE EDUCATION/TRAINING PROGRAM

## 2023-02-10 PROCEDURE — 3074F SYST BP LT 130 MM HG: CPT | Mod: CPTII,,, | Performed by: STUDENT IN AN ORGANIZED HEALTH CARE EDUCATION/TRAINING PROGRAM

## 2023-02-10 PROCEDURE — 3074F PR MOST RECENT SYSTOLIC BLOOD PRESSURE < 130 MM HG: ICD-10-PCS | Mod: CPTII,,, | Performed by: STUDENT IN AN ORGANIZED HEALTH CARE EDUCATION/TRAINING PROGRAM

## 2023-02-10 PROCEDURE — 3008F PR BODY MASS INDEX (BMI) DOCUMENTED: ICD-10-PCS | Mod: CPTII,,, | Performed by: STUDENT IN AN ORGANIZED HEALTH CARE EDUCATION/TRAINING PROGRAM

## 2023-02-10 PROCEDURE — 4010F PR ACE/ARB THEARPY RXD/TAKEN: ICD-10-PCS | Mod: CPTII,,, | Performed by: STUDENT IN AN ORGANIZED HEALTH CARE EDUCATION/TRAINING PROGRAM

## 2023-02-10 PROCEDURE — 3044F HG A1C LEVEL LT 7.0%: CPT | Mod: CPTII,,, | Performed by: STUDENT IN AN ORGANIZED HEALTH CARE EDUCATION/TRAINING PROGRAM

## 2023-02-10 PROCEDURE — 99496 TRANSJ CARE MGMT HIGH F2F 7D: CPT | Mod: S$PBB,,, | Performed by: STUDENT IN AN ORGANIZED HEALTH CARE EDUCATION/TRAINING PROGRAM

## 2023-02-10 PROCEDURE — 3044F PR MOST RECENT HEMOGLOBIN A1C LEVEL <7.0%: ICD-10-PCS | Mod: CPTII,,, | Performed by: STUDENT IN AN ORGANIZED HEALTH CARE EDUCATION/TRAINING PROGRAM

## 2023-02-10 PROCEDURE — 1159F MED LIST DOCD IN RCRD: CPT | Mod: CPTII,,, | Performed by: STUDENT IN AN ORGANIZED HEALTH CARE EDUCATION/TRAINING PROGRAM

## 2023-02-10 PROCEDURE — 3078F PR MOST RECENT DIASTOLIC BLOOD PRESSURE < 80 MM HG: ICD-10-PCS | Mod: CPTII,,, | Performed by: STUDENT IN AN ORGANIZED HEALTH CARE EDUCATION/TRAINING PROGRAM

## 2023-02-10 PROCEDURE — 4010F ACE/ARB THERAPY RXD/TAKEN: CPT | Mod: CPTII,,, | Performed by: STUDENT IN AN ORGANIZED HEALTH CARE EDUCATION/TRAINING PROGRAM

## 2023-02-10 PROCEDURE — 99496 TRANSITIONAL CARE MANAGE SERVICE 7 DAY DISCHARGE: ICD-10-PCS | Mod: S$PBB,,, | Performed by: STUDENT IN AN ORGANIZED HEALTH CARE EDUCATION/TRAINING PROGRAM

## 2023-02-10 PROCEDURE — 3078F DIAST BP <80 MM HG: CPT | Mod: CPTII,,, | Performed by: STUDENT IN AN ORGANIZED HEALTH CARE EDUCATION/TRAINING PROGRAM

## 2023-02-10 PROCEDURE — 3008F BODY MASS INDEX DOCD: CPT | Mod: CPTII,,, | Performed by: STUDENT IN AN ORGANIZED HEALTH CARE EDUCATION/TRAINING PROGRAM

## 2023-02-10 PROCEDURE — 99215 OFFICE O/P EST HI 40 MIN: CPT | Mod: PBBFAC | Performed by: STUDENT IN AN ORGANIZED HEALTH CARE EDUCATION/TRAINING PROGRAM

## 2023-02-10 PROCEDURE — 1159F PR MEDICATION LIST DOCUMENTED IN MEDICAL RECORD: ICD-10-PCS | Mod: CPTII,,, | Performed by: STUDENT IN AN ORGANIZED HEALTH CARE EDUCATION/TRAINING PROGRAM

## 2023-02-10 PROCEDURE — 99999 PR PBB SHADOW E&M-EST. PATIENT-LVL V: ICD-10-PCS | Mod: PBBFAC,,, | Performed by: STUDENT IN AN ORGANIZED HEALTH CARE EDUCATION/TRAINING PROGRAM

## 2023-02-10 RX ORDER — ISOSORBIDE DINITRATE 20 MG/1
1 TABLET ORAL 2 TIMES DAILY
COMMUNITY
Start: 2023-01-11 | End: 2023-02-17 | Stop reason: DRUGHIGH

## 2023-02-10 RX ORDER — APIXABAN 2.5 MG/1
TABLET, FILM COATED ORAL
COMMUNITY
Start: 2023-01-01 | End: 2023-02-16 | Stop reason: SDUPTHER

## 2023-02-10 RX ORDER — TIOTROPIUM BROMIDE INHALATION SPRAY 1.56 UG/1
2.5 SPRAY, METERED RESPIRATORY (INHALATION) DAILY
Qty: 4 G | Refills: 5 | Status: SHIPPED | OUTPATIENT
Start: 2023-02-10 | End: 2023-05-03

## 2023-02-10 RX ORDER — PREGABALIN 25 MG/1
25 CAPSULE ORAL 2 TIMES DAILY
Qty: 60 CAPSULE | Refills: 5 | Status: SHIPPED | OUTPATIENT
Start: 2023-02-10 | End: 2023-05-03 | Stop reason: SDUPTHER

## 2023-02-10 NOTE — PROGRESS NOTES
Subjective:       Patient ID: Hafsa Hawley is a 57 y.o. female.    Chief Complaint: Hospital Follow Up (Pt here for hospital f/u states was having vomiting and shortness of breath. Would like to discuss ongoing dizziness/)    Pt here for hospital follow-up    She was recently admitted 1/30/23-2/3/23.     Transitional Care Note    Family and/or Caretaker present at visit?  No.  Diagnostic tests reviewed/disposition: No diagnosic tests pending after this hospitalization.  Disease/illness education: CHF  Home health/community services discussion/referrals: Patient does not have home health established from hospital visit.  They do need home health.  If needed, we will set up home health for the patient. She has medicaid and we have not had any luck trying to find a home health agency that accepts medicaid.  Establishment or re-establishment of referral orders for community resources: No other necessary community resources.   Discussion with other health care providers: No discussion with other health care providers necessary.          Per DC summary:  Admitted to Hospital Medicine with acute on chronic heart failure exacerbation. Found to have elevated troponin on admission, however below her baseline. She was started on Lasix 80 mg IV BID with improvement in symptoms. HTS to follow, consulted for initiation of inpatient evaluation vs outpatient evaluation. HTS to continue evaluation in outpatient setting per patient wishes. Transitioning to home dose of Lasix. Medically stable for discharge home.    Since that time, they stopped her amiodarone and hydralazine. She is otherwise on her same medications.     She is also still having issues with chronic cough, nausea, and vomiting.     Review of Systems   Constitutional:  Positive for activity change. Negative for appetite change, chills, fatigue and fever.   HENT:  Negative for congestion and sore throat.    Respiratory:  Positive for cough and shortness of breath  (at baseline).    Cardiovascular:  Positive for leg swelling (at baseline). Negative for chest pain.   Gastrointestinal:  Positive for nausea. Negative for abdominal pain, diarrhea and vomiting.   Genitourinary:  Negative for dysuria, frequency, hematuria and urgency.   Musculoskeletal:  Positive for arthralgias and myalgias.   Neurological:  Negative for dizziness, syncope and light-headedness.   Psychiatric/Behavioral:  Positive for dysphoric mood. The patient is nervous/anxious.      Objective:      Physical Exam   HENT:   Head: Normocephalic and atraumatic.   Cardiovascular: Normal heart sounds.   No murmur heard.  Pulmonary/Chest: Effort normal and breath sounds normal. No respiratory distress.   Neurological: She is alert.   Psychiatric:   In better spirits today; excited about the potential for tranplant   Vitals reviewed.    Assessment:       1. Hospital discharge follow-up    2. Restrictive lung disease    3. Chronic combined systolic and diastolic heart failure    4. Chronic respiratory failure with hypoxia and hypercapnia    5. Chronic foot pain, left    6. Neuropathy    7. Type 2 diabetes mellitus without complication, without long-term current use of insulin        Plan:           1. Hospital discharge follow-up    2. Restrictive lung disease  -     tiotropium bromide (SPIRIVA RESPIMAT) 1.25 mcg/actuation inhaler; Inhale 2 puffs into the lungs once daily. Controller  Dispense: 4 g; Refill: 5    3. Chronic combined systolic and diastolic heart failure    4. Chronic respiratory failure with hypoxia and hypercapnia  -     tiotropium bromide (SPIRIVA RESPIMAT) 1.25 mcg/actuation inhaler; Inhale 2 puffs into the lungs once daily. Controller  Dispense: 4 g; Refill: 5    5. Chronic foot pain, left  -     Ambulatory referral/consult to Podiatry; Future; Expected date: 02/17/2023    6. Neuropathy  -     pregabalin (LYRICA) 25 MG capsule; Take 1 capsule (25 mg total) by mouth 2 (two) times daily.  Dispense: 60  capsule; Refill: 5    7. Type 2 diabetes mellitus without complication, without long-term current use of insulin  -     MICROALBUMIN / CREATININE RATIO URINE; Future; Expected date: 02/10/2023    Pt states spiriva respimat helps her much more than handihaler, will change script  Start lyrica for neuropathic pain; failed gabapentin. Already on cymbalta  Refer podiatry  Follow-up transplant as scheduled  Follow-up cardiology as scheduled  RTC 3 months or sooner if needed

## 2023-02-16 ENCOUNTER — HOSPITAL ENCOUNTER (OUTPATIENT)
Dept: PULMONOLOGY | Facility: CLINIC | Age: 58
Discharge: HOME | End: 2023-02-16
Payer: MEDICAID

## 2023-02-16 ENCOUNTER — HOSPITAL ENCOUNTER (OUTPATIENT)
Dept: RADIOLOGY | Facility: HOSPITAL | Age: 58
Discharge: HOME | End: 2023-02-16
Attending: INTERNAL MEDICINE
Payer: MEDICAID

## 2023-02-16 ENCOUNTER — OFFICE VISIT (OUTPATIENT)
Dept: TRANSPLANT | Facility: CLINIC | Age: 58
End: 2023-02-16
Attending: INTERNAL MEDICINE
Payer: MEDICAID

## 2023-02-16 VITALS
BODY MASS INDEX: 32.66 KG/M2 | DIASTOLIC BLOOD PRESSURE: 82 MMHG | HEART RATE: 83 BPM | HEIGHT: 66 IN | SYSTOLIC BLOOD PRESSURE: 132 MMHG | WEIGHT: 203.25 LBS

## 2023-02-16 VITALS — WEIGHT: 203.25 LBS | BODY MASS INDEX: 32.66 KG/M2 | HEIGHT: 66 IN

## 2023-02-16 DIAGNOSIS — Z01.818 PRE-TRANSPLANT EVALUATION FOR HEART TRANSPLANT: ICD-10-CM

## 2023-02-16 DIAGNOSIS — I42.8 NICM (NONISCHEMIC CARDIOMYOPATHY): ICD-10-CM

## 2023-02-16 DIAGNOSIS — Z76.82 ORGAN TRANSPLANT CANDIDATE: ICD-10-CM

## 2023-02-16 DIAGNOSIS — Z95.810 ICD (IMPLANTABLE CARDIOVERTER-DEFIBRILLATOR) IN PLACE: Chronic | ICD-10-CM

## 2023-02-16 DIAGNOSIS — I13.0 HYPERTENSIVE CARDIOVASCULAR-RENAL DISEASE, STAGE 1-4 OR UNSPECIFIED CHRONIC KIDNEY DISEASE, WITH HEART FAILURE: ICD-10-CM

## 2023-02-16 DIAGNOSIS — I50.42 CHRONIC COMBINED SYSTOLIC AND DIASTOLIC CONGESTIVE HEART FAILURE: Primary | ICD-10-CM

## 2023-02-16 DIAGNOSIS — I42.8 NICM (NONISCHEMIC CARDIOMYOPATHY): Chronic | ICD-10-CM

## 2023-02-16 DIAGNOSIS — N18.4 CKD (CHRONIC KIDNEY DISEASE), STAGE IV: Chronic | ICD-10-CM

## 2023-02-16 DIAGNOSIS — I48.0 PAROXYSMAL ATRIAL FIBRILLATION: Chronic | ICD-10-CM

## 2023-02-16 PROBLEM — E87.5 HYPERKALEMIA: Status: RESOLVED | Noted: 2021-10-28 | Resolved: 2023-02-16
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
## 2023-02-16 PROBLEM — I50.43 ACUTE ON CHRONIC COMBINED SYSTOLIC AND DIASTOLIC HEART FAILURE: Status: RESOLVED | Noted: 2021-11-30 | Resolved: 2023-02-16

## 2023-02-16 PROBLEM — I50.9 ACUTE DECOMPENSATED HEART FAILURE: Status: RESOLVED | Noted: 2022-11-18 | Resolved: 2023-02-16

## 2023-02-16 PROBLEM — J96.21 ACUTE ON CHRONIC RESPIRATORY FAILURE WITH HYPOXIA: Status: RESOLVED | Noted: 2021-08-05 | Resolved: 2023-02-16

## 2023-02-16 PROBLEM — N17.9 AKI (ACUTE KIDNEY INJURY): Status: RESOLVED | Noted: 2021-10-28 | Resolved: 2023-02-16

## 2023-02-16 PROBLEM — R50.9 FEVER: Status: RESOLVED | Noted: 2021-09-17 | Resolved: 2023-02-16

## 2023-02-16 PROCEDURE — 4010F ACE/ARB THERAPY RXD/TAKEN: CPT | Mod: CPTII,TXP,, | Performed by: INTERNAL MEDICINE

## 2023-02-16 PROCEDURE — 1111F PR DISCHARGE MEDS RECONCILED W/ CURRENT OUTPATIENT MED LIST: ICD-10-PCS | Mod: CPTII,TXP,, | Performed by: INTERNAL MEDICINE

## 2023-02-16 PROCEDURE — 1159F MED LIST DOCD IN RCRD: CPT | Mod: CPTII,TXP,, | Performed by: INTERNAL MEDICINE

## 2023-02-16 PROCEDURE — 94010 BREATHING CAPACITY TEST: ICD-10-PCS | Mod: 26,S$PBB,TXP, | Performed by: INTERNAL MEDICINE

## 2023-02-16 PROCEDURE — 94727 GAS DIL/WSHOT DETER LNG VOL: CPT | Mod: PBBFAC,TXP | Performed by: INTERNAL MEDICINE

## 2023-02-16 PROCEDURE — 1160F RVW MEDS BY RX/DR IN RCRD: CPT | Mod: CPTII,TXP,, | Performed by: INTERNAL MEDICINE

## 2023-02-16 PROCEDURE — 71250 CT THORAX DX C-: CPT | Mod: 26,TXP,, | Performed by: RADIOLOGY

## 2023-02-16 PROCEDURE — 94727 PR PULM FUNCTION TEST BY GAS: ICD-10-PCS | Mod: 26,S$PBB,TXP, | Performed by: INTERNAL MEDICINE

## 2023-02-16 PROCEDURE — 94729 PR C02/MEMBANE DIFFUSE CAPACITY: ICD-10-PCS | Mod: 26,S$PBB,TXP, | Performed by: INTERNAL MEDICINE

## 2023-02-16 PROCEDURE — 99999 PR PBB SHADOW E&M-EST. PATIENT-LVL V: CPT | Mod: PBBFAC,TXP,, | Performed by: INTERNAL MEDICINE

## 2023-02-16 PROCEDURE — 1160F PR REVIEW ALL MEDS BY PRESCRIBER/CLIN PHARMACIST DOCUMENTED: ICD-10-PCS | Mod: CPTII,TXP,, | Performed by: INTERNAL MEDICINE

## 2023-02-16 PROCEDURE — 99215 OFFICE O/P EST HI 40 MIN: CPT | Mod: PBBFAC,25,TXP | Performed by: INTERNAL MEDICINE

## 2023-02-16 PROCEDURE — 3075F SYST BP GE 130 - 139MM HG: CPT | Mod: CPTII,TXP,, | Performed by: INTERNAL MEDICINE

## 2023-02-16 PROCEDURE — 99214 OFFICE O/P EST MOD 30 MIN: CPT | Mod: S$PBB,TXP,, | Performed by: INTERNAL MEDICINE

## 2023-02-16 PROCEDURE — 3044F PR MOST RECENT HEMOGLOBIN A1C LEVEL <7.0%: ICD-10-PCS | Mod: CPTII,TXP,, | Performed by: INTERNAL MEDICINE

## 2023-02-16 PROCEDURE — 94010 BREATHING CAPACITY TEST: CPT | Mod: 26,S$PBB,TXP, | Performed by: INTERNAL MEDICINE

## 2023-02-16 PROCEDURE — 74176 CT ABD & PELVIS W/O CONTRAST: CPT | Mod: TC,TXP

## 2023-02-16 PROCEDURE — 1111F DSCHRG MED/CURRENT MED MERGE: CPT | Mod: CPTII,TXP,, | Performed by: INTERNAL MEDICINE

## 2023-02-16 PROCEDURE — 4010F PR ACE/ARB THEARPY RXD/TAKEN: ICD-10-PCS | Mod: CPTII,TXP,, | Performed by: INTERNAL MEDICINE

## 2023-02-16 PROCEDURE — 99214 PR OFFICE/OUTPT VISIT, EST, LEVL IV, 30-39 MIN: ICD-10-PCS | Mod: S$PBB,TXP,, | Performed by: INTERNAL MEDICINE

## 2023-02-16 PROCEDURE — 71250 CT CHEST ABDOMEN PELVIS WITHOUT CONTRAST(XPD): ICD-10-PCS | Mod: 26,TXP,, | Performed by: RADIOLOGY

## 2023-02-16 PROCEDURE — 3075F PR MOST RECENT SYSTOLIC BLOOD PRESS GE 130-139MM HG: ICD-10-PCS | Mod: CPTII,TXP,, | Performed by: INTERNAL MEDICINE

## 2023-02-16 PROCEDURE — 3044F HG A1C LEVEL LT 7.0%: CPT | Mod: CPTII,TXP,, | Performed by: INTERNAL MEDICINE

## 2023-02-16 PROCEDURE — 94618 PULMONARY STRESS TESTING: ICD-10-PCS | Mod: 26,S$PBB,TXP, | Performed by: INTERNAL MEDICINE

## 2023-02-16 PROCEDURE — 3008F PR BODY MASS INDEX (BMI) DOCUMENTED: ICD-10-PCS | Mod: CPTII,TXP,, | Performed by: INTERNAL MEDICINE

## 2023-02-16 PROCEDURE — 94729 DIFFUSING CAPACITY: CPT | Mod: 26,S$PBB,TXP, | Performed by: INTERNAL MEDICINE

## 2023-02-16 PROCEDURE — 94727 GAS DIL/WSHOT DETER LNG VOL: CPT | Mod: 26,S$PBB,TXP, | Performed by: INTERNAL MEDICINE

## 2023-02-16 PROCEDURE — 74176 CT ABD & PELVIS W/O CONTRAST: CPT | Mod: 26,TXP,, | Performed by: RADIOLOGY

## 2023-02-16 PROCEDURE — 99999 PR PBB SHADOW E&M-EST. PATIENT-LVL V: ICD-10-PCS | Mod: PBBFAC,TXP,, | Performed by: INTERNAL MEDICINE

## 2023-02-16 PROCEDURE — 3008F BODY MASS INDEX DOCD: CPT | Mod: CPTII,TXP,, | Performed by: INTERNAL MEDICINE

## 2023-02-16 PROCEDURE — 74176 CT CHEST ABDOMEN PELVIS WITHOUT CONTRAST(XPD): ICD-10-PCS | Mod: 26,TXP,, | Performed by: RADIOLOGY

## 2023-02-16 PROCEDURE — 94618 PULMONARY STRESS TESTING: CPT | Mod: 26,S$PBB,TXP, | Performed by: INTERNAL MEDICINE

## 2023-02-16 PROCEDURE — 94010 BREATHING CAPACITY TEST: CPT | Mod: PBBFAC,TXP | Performed by: INTERNAL MEDICINE

## 2023-02-16 PROCEDURE — 94618 PULMONARY STRESS TESTING: CPT | Mod: PBBFAC,TXP | Performed by: INTERNAL MEDICINE

## 2023-02-16 PROCEDURE — 94729 DIFFUSING CAPACITY: CPT | Mod: PBBFAC,TXP | Performed by: INTERNAL MEDICINE

## 2023-02-16 PROCEDURE — 3079F PR MOST RECENT DIASTOLIC BLOOD PRESSURE 80-89 MM HG: ICD-10-PCS | Mod: CPTII,TXP,, | Performed by: INTERNAL MEDICINE

## 2023-02-16 PROCEDURE — 3079F DIAST BP 80-89 MM HG: CPT | Mod: CPTII,TXP,, | Performed by: INTERNAL MEDICINE

## 2023-02-16 PROCEDURE — 1159F PR MEDICATION LIST DOCUMENTED IN MEDICAL RECORD: ICD-10-PCS | Mod: CPTII,TXP,, | Performed by: INTERNAL MEDICINE

## 2023-02-16 RX ORDER — BUMETANIDE 2 MG/1
4 TABLET ORAL 2 TIMES DAILY
Qty: 120 TABLET | Refills: 11 | Status: ON HOLD | OUTPATIENT
Start: 2023-02-16 | End: 2023-03-29 | Stop reason: SDUPTHER

## 2023-02-16 RX ORDER — BUMETANIDE 2 MG/1
4 TABLET ORAL DAILY
Qty: 60 TABLET | Refills: 11 | Status: SHIPPED | OUTPATIENT
Start: 2023-02-16 | End: 2023-02-16 | Stop reason: SDUPTHER

## 2023-02-16 NOTE — Clinical Note
She is going to call you with meds please update med list and let me know.  I am changing the fruosemide to bumetanide-see patient inst sheet; she needs BMP on Wedn and show whomever is here as I will be off next 2 weeks.  Thanks

## 2023-02-16 NOTE — PROGRESS NOTES
Subjective:   Transplant status: active    HPI:  Ms. Hawley is a 57 y.o. year old Black or  female who has presented to be evaluated as a potential heart transplant recipient.      58 yo BF with CHF since 2013 due to NICM, pulmonary hypertension, DM, HTN, permanent AF and ICD.  She was previously evaluated by our team and presented to committee on 3/9/22.  At the time she was declined for OHT or LVAD due to renal function, lung function and difficulty consistently following up with the team.   She returns for f/u visit as undergoing evaluation for heart-kidney transplant.  She does not have her medications with her.  She is not have a medication list of her home medicines with her.  She does not know any of her medications by name except Lasix.  Was described as Lasix 80 mg twice daily but she reports she takes the morning dose but only use the evening dose if she feels the need to do so.  I asked her how she knows without a scale she said she just bases this on her abdominal bloating and any edema.  She reports that her scale is broken and she has not gotten a new 1 so she has no daily weights to report.  She is accompanied by her granddaughter.  She tells me if she was approved for transplant her primary caregiver will be Jeanie Jaimes (sister and Laura aJimes her niece.  She is going to stay in Laura's house after her transplant, if approved.    She saw Dr. Guardado in January 2023 at which time sent to the ED and admitted to \[facility]\"" medicine 1/3/23.  She was treated for ADHF with IV lasix 80 mg IV BID and HTS consulted.  At that time it was noted that she had run out of metoprolol for 1 week PTA.  She was readmitted to [facility] few weeks later with ADHF and at discharge told to see HTS.  She came Feb 3, 2023 to see Dr. Guardado but was sent to ED with nausea and vomiting.  There treated with IV lasix and sent home from ED.  She comes today for f/u visit.  Of note per Dr. Guardado she was seen by Pulm in Jan  2023 and they felt that there was no overt evidence of ILD or COPD on PFTs and CT chest.  They felt that the etiology of her chronic hypoxic and hypercapnic respiratory failure was not clear.     Important to note that she had 2 admissions for ADHF in November 2022 as well.    At this time she reports that her shortness of breath is stable for her and describes NYHA class 3 symptoms.  She has some swelling and abdominal bloating but nothing that is really changed.  She denies palpitations presyncope or syncope.    She sees a pulmonologist at [facility] but does not recall his name and I do not see consult note.    Had recent selection meeting she was deferred to reassess PFTs pulmonary function, assure adequate caregiver plan and to check on her compliance.      Past Medical History:   Diagnosis Date    Anemia     Arthritis     Atrial fibrillation     OAC    Chronic respiratory failure with hypoxia, on home oxygen therapy     2L with activity, off at rest.  Per Pulm  no overt evidence of ILD or COPD on PFTs and CT to explain O2 needs.    CKD (chronic kidney disease), stage IV 05/08/2018    Congestive heart failure     s/p AICD placement,    Deep vein thrombosis     Depression     elevated bilirubin d/t Gilbert's syndrome     confirmed by [place] genetic testing, evaluated by hepatology    Encounter for blood transfusion     GERD (gastroesophageal reflux disease)     Hypertension     Pheochromocytoma, malignant     Right kidney mass     Sleep apnea     Thalassemia trait, alpha     Thyroid disease     Type 2 diabetes mellitus with hyperglycemia, without long-term current use of insulin 08/13/2020     Past Surgical History:   Procedure Laterality Date    APPENDECTOMY      BONE MARROW BIOPSY      CARDIAC DEFIBRILLATOR PLACEMENT Left 12/2016    CARDIAC ELECTROPHYSIOLOGY MAPPING AND ABLATION      CARDIAC ELECTROPHYSIOLOGY MAPPING AND ABLATION      COLONOSCOPY N/A 5/6/2022    Procedure: COLONOSCOPY;  Surgeon: Arely Betancourt,  MD;  Location: [facility] ENDO (2ND FLR);  Service: Endoscopy;  Laterality: N/A;  heart transplant candidate/ EF 25% - 2nd floor/ defib - Biotronik - ERW  Eliquis - per Dr. Cortez with [facility], Pt ok to hold Eliquis x 2 days prior-see media tab-outside correspondence dated 21  - ERW  verbal instructions/portal instructions/email instructions - s    EYE SURGERY      due to running tears    FRACTURE SURGERY Left     hand 5th digit    HYSTERECTOMY      KNEE SURGERY Left 2016    hematoma    LIVER BIOPSY  10/24/2018    Minimal steatosis, predominantly macrovesicular, 1%, Minimal nonspecific portal inflammation, no fibrosis. No findings on biopsy to explain elevated bilirubin levels. Could be d/t Gilbert's =?- hemolysis    RIGHT HEART CATHETERIZATION Right 2021    Procedure: INSERTION, CATHETER, RIGHT HEART;  Surgeon: Irving Cardenas MD;  Location: [facility] CATH LAB;  Service: Cardiology;  Laterality: Right;    RIGHT HEART CATHETERIZATION Right 2022    Procedure: INSERTION, CATHETER, RIGHT HEART;  Surgeon: Burke Camilo MD;  Location: [facility] CATH LAB;  Service: Cardiology;  Laterality: Right;    TRANSJUGULAR BIOPSY OF LIVER N/A 10/24/2018    Procedure: BIOPSY, LIVER, TRANSJUGULAR APPROACH;  Surgeon: Carmen Diagnostic Provider;  [facility];  Service: Radiology;  Laterality: N/A;     OB History          2    Para   2    Term   2            AB        Living             SAB        IAB        Ectopic        Multiple        Live Births                   Review of Systems   Constitutional: Negative for chills and fever.   HENT:  Negative for hearing loss.    Eyes:  Negative for visual disturbance.   Cardiovascular:  Positive for dyspnea on exertion. Negative for chest pain, irregular heartbeat, leg swelling, orthopnea, palpitations, paroxysmal nocturnal dyspnea and syncope.   Respiratory:  Positive for cough and shortness of breath.    Musculoskeletal:  Negative for muscle weakness.  "  Gastrointestinal:  Positive for nausea and vomiting. Negative for diarrhea.   Neurological:  Negative for focal weakness.   Psychiatric/Behavioral:  Negative for memory loss.      Objective:   Blood pressure 132/82, pulse 83, height 5' 6" (1.676 m), weight 92.2 kg (203 lb 4.2 oz).body mass index is 32.81 kg/m².  Physical Exam  Constitutional:       Appearance: She is ill-appearing.      Comments: /82 (BP Location: Left arm, Patient Position: Sitting, BP Method: Medium (Automatic))   Pulse 83   Ht 5' 6" (1.676 m)   Wt 92.2 kg (203 lb 4.2 oz)   LMP  (LMP Unknown)   BMI 32.81 kg/m²      HENT:      Head: Atraumatic.   Eyes:      Extraocular Movements: Extraocular movements intact.   Cardiovascular:      Rate and Rhythm: Normal rate and regular rhythm.      Pulses: Normal pulses.      Heart sounds: Normal heart sounds.      Comments: JVP 12 cm.  Pulmonary:      Breath sounds: Normal breath sounds.   Abdominal:      Palpations: Abdomen is soft.      Tenderness: There is no abdominal tenderness.   Musculoskeletal:         General: Normal range of motion.      Right lower leg: No edema.      Left lower leg: No edema.   Neurological:      General: No focal deficit present.      Mental Status: She is alert and oriented to person, place, and time.     Lab Results   Component Value Date    BNP 2,470 (H) 01/30/2023     (H) 01/06/2023     (H) 01/04/2023     Lab Results   Component Value Date    BNP 2,470 (H) 01/30/2023     02/16/2023    K 4.3 02/16/2023    MG 1.9 02/03/2023     02/16/2023    CO2 23 02/16/2023    PHOS 3.3 02/02/2023    BUN 36 (H) 02/16/2023    CREATININE 1.9 (H) 02/16/2023     (H) 02/16/2023    HGBA1C 4.6 01/30/2023    AST 22 02/16/2023    ALT 17 02/16/2023    ALBUMIN 3.5 02/16/2023    PROT 6.1 02/16/2023    BILITOT 1.7 (H) 02/16/2023    WBC 4.16 02/16/2023    HGB 8.1 (L) 02/16/2023    HCT 27.7 (L) 02/16/2023    HCT 45 12/07/2021     02/16/2023    INR 1.0 " 01/04/2023     (H) 01/04/2023    TSH 1.407 01/04/2023    Q6CMPTF 8.4 09/11/2017    FREET4 1.07 05/09/2018    CHOL 136 01/04/2023    HDL 63 01/04/2023    LDLCALC 57.8 (L) 01/04/2023    TRIG 76 01/04/2023 02/16/2023 6 minute walk test on room air she walked 244 m.  Resting saturation 92% after exercise 88% recovery 93%.  She stopped for chest pain, shortness of breath and dizziness.  Her Haseeb scale was 3 at rest and 7-8 post exercise.    02/16/2023 6 minute walk test on oxygen 2 liters/minute she walked 30.5 m oxygen saturation at rest 96% post exercise 97% recovery 94%.  She stopped for shortness of breath.    CPX 12/19/22  Resting spirometry reveals an FVC = 1.47L which is 44.69% of predicted, an FEV1 of 1.03L, which is 39.09% of predicted and an FEV1/FVC ratio of 70.07%. The MVV = 41.2 L/min, which is 42.35% of predicted.     The respiratory exchange ratio (RER) was 0.87, suggesting poor effort.     The breathing reserve is calculated at 21.36%, which is borderline reduced. Oxygen saturation with exercise  became reduced reaching a ruth ann of 88% from a baseline value of 90%.     The peak VO2 was 10.1 ml/kg/min which is 33.44% of predicted equating to a functional capacity of 2.89 METS indicating severe functional impairment.     The anaerobic threshold was not attained.     The peak VO2 Lean was 12.58 ml/kg of lean body weight/min indicating a poor prognosis in heart failure.     The VE/VCO2 [place] was 48.1. The Resting PetCO2 was 27.0.     Severe functional impairment associated with a borderline reduced breathing reserve, reduced oxygen saturation with exercise, poor effort. These findings are indicative of functional impairment secondary to poor effort.    RHC 12/19/22    RA 13 PA 78/40 (52)  PCWP 26    CO 4.7 l/min  CI 2.3 l/min/m2    PVR 5.5 BOLDEN.    SVR 1333 dynes/cm5.s    12/9/2022 Device check  Presenting egram demonstrates: AS/VS   Autocapture Algorithms: off   Device fxn WNL   RA pacing  %, V  pacing  0%   Atrial arrhythmias: x1 SVT episode, max duration 43 seconds   Ventricular arrhythmias: none   Battery Longevity/Status: MOS1/53%   Resume remote transmission in 3 months.   Return to device clinic in April 2023 12/22/2021 PFT  Spirometry shows a reduced vital capacity suggesting restriction. Lung volumes show severe restriction is present. DLCO is moderately decreased.    Assessment:      1. Chronic combined systolic and diastolic congestive heart failure    2. Pre-transplant evaluation for heart transplant    3. CKD (chronic kidney disease), stage IV    4. Hypertensive cardiovascular-renal disease, stage 1-4 or unspecified chronic kidney disease, with heart failure    5. NICM (nonischemic cardiomyopathy)    6. Paroxysmal atrial fibrillation    7. ICD (implantable cardioverter-defibrillator) in place      All diagnosis listed above were specifically reviewed by me with treatments continued as prior or changed as reflected in the plan of this note.    DISCUSSION:  I am very concerned that her lung disease is her limiting factor based upon the desaturation with exercise not just today but also on the day of her CPX.  On the CPX in December her breathing reserve was borderline reduced.  An elevated VCO2 slope can occur with lung disease as well as heart failure.  I suspect she has a combination of lung disease and heart failure that is limiting her ability to be physically active and resulting in her high level of symptomology.  I note on her problem list a diagnosis of chronic respiratory hypoxic and hypercapnic respiratory failure.  I do not see a blood gas to confirm this diagnosis but one will be obtained.  Plan:   Obtain ABG--not an old one from the hospital but instead in clinic    I would like to obtain the notes from her pulmonologist at [facility].  I can not find them in his chart or in Care everywhere last the coordinators to help with this.  I am told that he does not believe  her lungs of the limiting factor but we have objective data to the contrary.  She is getting repeat PFTs today.    She does not believe that the Lasix makes a big difference in her urine output but reports that she goes pretty much throughout the day.  She reports taking the Lasix 80 mg every morning and believes she takes the p.m. dose on average 3 times per week.  I am changing her to bumetanide 4 mg twice daily get a BMP on Wednesday at [facility].      I have asked her to get a scale weight daily and keep a log which she will bring to every clinic visit    I have asked her to call the coordinator to review her home medications that she is currently taking so that we can compare to our patient list.  I have also stressed the importance of bringing all of her medications that she is taking to each clinic visit.  She is welcome to bring a list but must bring the pills as well so if any questions arise we can check for accuracy.    Patient is now NYHA III  Recommend 2 gram sodium restriction and 1500cc fluid restriction.  Encourage physical activity with graded exercise program.  Requested patient to weigh themselves daily, and to notify us if their weight increases by more than 3 lbs in 1 day or 5 lbs in 1 week.     Listed for transplant: No    Patient advised that it is recommended that all transplant candidates, and their close contacts and household members receive Covid vaccination.    UNOS Patient Status  Functional Status: 70% - Cares for self: unable to carry on normal activity or active work  Physical Capacity: No Limitations  Working for Income: No  If no, reason not working: Disability

## 2023-02-16 NOTE — PATIENT INSTRUCTIONS
We need to know exactly what you have been taking so please call coordinator back today with pill name, strength, how often you take    Please get a scale to weigh every day and keep a record to bring with your pills to every clinic visit    Change furosemide 80 mg to bumetanide 2 mg tabs.  You will take 2 of the bumetanide 2 mg tabs (4 mg) twice a day     Get BMP in [facility]

## 2023-02-16 NOTE — LETTER
February 16, 2023        Krysta oTny  [address]  [facility] [address]  Phone: [phone]  Fax: [phone]             [facility]  [address]  [facility] [address]  Phone: [phone]   Patient: Hafsa Hawley   MR Number: [id number]   YOB: 1965   Date of Visit: 2/16/2023       Dear Dr. Krysta Tony    Thank you for referring Hafsa Hawley to me for evaluation. Attached you will find relevant portions of my assessment and plan of care.    If you have questions, please do not hesitate to call me. I look forward to following Hafsa Hawley along with you.    Sincerely,    Jake Orozco Jr, MD    Enclosure    If you would like to receive this communication electronically, please contact [email or web address] or [phone] to request [employer] Link access.    [employer] Link is a tool which provides read-only access to select patient information with whom you have a relationship. Its easy to use and provides real time access to review your patients record including encounter summaries, notes, results, and demographic information.    If you feel you have received this communication in error or would no longer like to receive these types of communications, please e-mail [email or web address]

## 2023-02-16 NOTE — Clinical Note
Please review meds with her to assure that our med list is correct.  Track BMP to be done Wedn at [facility] and review with doctor in clinic as I will be on vacation

## 2023-02-16 NOTE — Clinical Note
Obtain ABG--not an old one from the hospital but instead in clinic  I would like to obtain the notes from her pulmonologist at [facility].  I can not find them in his chart or in Care everywhere last the coordinators to help with this.  I am told that he does not believe her lungs of the limiting factor but we have objective data to the contrary.  She is getting repeat PFTs today.

## 2023-02-17 ENCOUNTER — TELEPHONE (OUTPATIENT)
Dept: TRANSPLANT | Facility: CLINIC | Age: 58
End: 2023-02-17
Payer: MEDICAID

## 2023-02-17 DIAGNOSIS — Z01.818 PRE-TRANSPLANT EVALUATION FOR HEART TRANSPLANT: Primary | ICD-10-CM

## 2023-02-17 RX ORDER — FUROSEMIDE 80 MG/1
80 TABLET ORAL 2 TIMES DAILY
Status: ON HOLD | COMMUNITY
End: 2023-02-20

## 2023-02-17 NOTE — TELEPHONE ENCOUNTER
"I called Ms Hawley to get her medication list.  She reported that she is just getting up because she was up all night vomiting.        She was unable to tell me the medications she is taking.  She is trying to read off the DC medication sheet.  States "The only thing that was changed is that Dr Ann is trying to get the inhaler and something for her foot, the gout neuropathy- Lyrica or something, but I'm not sure where his sheet is"  I stayed on the phone while she found her latest medication sheet and she said that she uses this sheet to fill her medication pill box each week.    I completed the medication reconciliation.      She has not started the Bumex as ordered yesterday or the Lyrica as these were not filled but said that she will get these picked up today.  She forgot to call [facility] as instructed by Dr Orozco yesterday.    I let her know that I will review these medications with Dr Orozco and be in touch with instructions.    "

## 2023-02-17 NOTE — PROCEDURES
Hafsa Hawley is a 57 y.o.  female patient, who presents for a 6 minute walk test ordered by MD Geo.  The diagnosis is Cardiomyopathy.  The patient's BMI is 32.8 kg/m2. Predicted distance (lower limit of normal) is 341.02 meters.    Test Results:    The test was completed with stops.  The patient stopped 2 times for a total of 34 seconds.  The total time walked was 326 seconds.  During walking, the patient reported:  Chest pain, Lightheadedness, Dizziness, Dyspnea.  The patient used no assistive devices during testing.     02/16/2023---------Distance: 243.84 meters (800 feet)     O2 Sat % Supplemental Oxygen Heart Rate Blood Pressure Haseeb Scale   Pre-exercise  (Resting) 92 % Room Air 85 bpm 129/73 mmHg 3   During Exercise 88 % Room Air 107 bpm 139/77 mmHg 7-8   Post-exercise   93 % Room Air  94 bpm       Recovery Time: 102 seconds    Oxygen Qualification:     O2 Sat % Supplemental Oxygen Heart Rate Blood Pressure Haseeb Scale   Pre-exercise  (Resting) 96 % 2 L/M  88 bpm  133/78 mmHg 3    During Exercise   97 %  2 L/M  94 bpm  133/77 mmHg 3    Post-exercise   94 %  2 L/M  90 bpm        Performing nurse/tech: DOMENICO Boswell      PREVIOUS STUDY:   12/22/2021---------Distance: 243.84 meters (800 feet)       O2 Sat % Supplemental Oxygen Heart Rate Blood Pressure Haseeb Scale   Pre-exercise  (Resting) 98 % 2 L/M 68 bpm 106/56 mmHg 2   During Exercise 91 % 2 L/M 73 bpm 131/72 mmHg 5-6   Post-exercise  (Recovery) 97 % 2 L/M  70 bpm           CLINICAL INTERPRETATION:  Six minute walk distance is 243.84 meters (800 feet) with very heavy dyspnea.  During exercise, there was significant desaturation while breathing room air.  Blood pressure remained stable and Heart rate increased significantly with walking.  The patient reported non-pulmonary symptoms during exercise.  Significant exercise impairment is likely due to desaturation and subjective symptoms.  The patient did complete the study, walking 326 seconds  of the 360 second test.  The patient may benefit from using supplemental oxygen during exertion.  Since the previous study in December 2021, exercise capacity is unchanged.  Based upon age and body mass index, exercise capacity is less than predicted.   Oxygen saturation did improve while breathing supplemental oxygen.

## 2023-02-17 NOTE — TELEPHONE ENCOUNTER
----- Message from Adrienne Edwards sent at 2/3/2023  5:13 PM CST -----  Regarding: Pre heart fu with Pft's 6 min walk labs and ct

## 2023-02-20 ENCOUNTER — HOSPITAL ENCOUNTER (INPATIENT)
Facility: HOSPITAL | Age: 58
LOS: 2 days | Discharge: HOME | DRG: 280 | End: 2023-02-22
Attending: STUDENT IN AN ORGANIZED HEALTH CARE EDUCATION/TRAINING PROGRAM | Admitting: INTERNAL MEDICINE
Payer: MEDICAID

## 2023-02-20 DIAGNOSIS — I50.9 ACUTE ON CHRONIC CONGESTIVE HEART FAILURE, UNSPECIFIED HEART FAILURE TYPE: Primary | ICD-10-CM

## 2023-02-20 DIAGNOSIS — N18.32 STAGE 3B CHRONIC KIDNEY DISEASE: ICD-10-CM

## 2023-02-20 DIAGNOSIS — R79.89 ELEVATED BRAIN NATRIURETIC PEPTIDE (BNP) LEVEL: ICD-10-CM

## 2023-02-20 DIAGNOSIS — R06.02 SHORTNESS OF BREATH: ICD-10-CM

## 2023-02-20 DIAGNOSIS — I50.42 CHRONIC COMBINED SYSTOLIC AND DIASTOLIC HEART FAILURE: ICD-10-CM

## 2023-02-20 DIAGNOSIS — R09.02 HYPOXIA: ICD-10-CM

## 2023-02-20 DIAGNOSIS — R79.89 ELEVATED TROPONIN: ICD-10-CM

## 2023-02-20 LAB
ALBUMIN SERPL BCP-MCNC: 3.7 G/DL (ref 3.5–5.2)
ALP SERPL-CCNC: 57 U/L (ref 55–135)
ALT SERPL W/O P-5'-P-CCNC: 36 U/L (ref 10–44)
ANION GAP SERPL CALC-SCNC: 12 MMOL/L (ref 8–16)
ANISOCYTOSIS BLD QL SMEAR: SLIGHT
AST SERPL-CCNC: 22 U/L (ref 10–40)
BASOPHILS # BLD AUTO: 0.03 K/UL (ref 0–0.2)
BASOPHILS NFR BLD: 0.6 % (ref 0–1.9)
BILIRUB SERPL-MCNC: 1.5 MG/DL (ref 0.1–1)
BNP SERPL-MCNC: 4274 PG/ML (ref 0–99)
BUN SERPL-MCNC: 36 MG/DL (ref 6–20)
CALCIUM SERPL-MCNC: 9.3 MG/DL (ref 8.7–10.5)
CHLORIDE SERPL-SCNC: 110 MMOL/L (ref 95–110)
CO2 SERPL-SCNC: 21 MMOL/L (ref 23–29)
CREAT SERPL-MCNC: 2.1 MG/DL (ref 0.5–1.4)
DIFFERENTIAL METHOD: ABNORMAL
EOSINOPHIL # BLD AUTO: 0.1 K/UL (ref 0–0.5)
EOSINOPHIL NFR BLD: 2.7 % (ref 0–8)
ERYTHROCYTE [DISTWIDTH] IN BLOOD BY AUTOMATED COUNT: 29.2 % (ref 11.5–14.5)
EST. GFR  (NO RACE VARIABLE): 27 ML/MIN/1.73 M^2
GIANT PLATELETS BLD QL SMEAR: PRESENT
GLUCOSE SERPL-MCNC: 125 MG/DL (ref 70–110)
GROUP A STREP, MOLECULAR: NEGATIVE
HCT VFR BLD AUTO: 28.4 % (ref 37–48.5)
HGB BLD-MCNC: 8.5 G/DL (ref 12–16)
HYPOCHROMIA BLD QL SMEAR: ABNORMAL
IMM GRANULOCYTES # BLD AUTO: 0.04 K/UL (ref 0–0.04)
IMM GRANULOCYTES NFR BLD AUTO: 0.8 % (ref 0–0.5)
INFLUENZA A, MOLECULAR: NEGATIVE
INFLUENZA B, MOLECULAR: NEGATIVE
LYMPHOCYTES # BLD AUTO: 1 K/UL (ref 1–4.8)
LYMPHOCYTES NFR BLD: 19.7 % (ref 18–48)
MCH RBC QN AUTO: 18 PG (ref 27–31)
MCHC RBC AUTO-ENTMCNC: 29.9 G/DL (ref 32–36)
MCV RBC AUTO: 60 FL (ref 82–98)
MONOCYTES # BLD AUTO: 0.5 K/UL (ref 0.3–1)
MONOCYTES NFR BLD: 9 % (ref 4–15)
NEUTROPHILS # BLD AUTO: 3.4 K/UL (ref 1.8–7.7)
NEUTROPHILS NFR BLD: 67.2 % (ref 38–73)
NRBC BLD-RTO: 6 /100 WBC
OVALOCYTES BLD QL SMEAR: ABNORMAL
PLATELET # BLD AUTO: 180 K/UL (ref 150–450)
PLATELET BLD QL SMEAR: ABNORMAL
PMV BLD AUTO: ABNORMAL FL (ref 9.2–12.9)
POIKILOCYTOSIS BLD QL SMEAR: SLIGHT
POLYCHROMASIA BLD QL SMEAR: ABNORMAL
POTASSIUM SERPL-SCNC: 4.3 MMOL/L (ref 3.5–5.1)
PROCALCITONIN SERPL IA-MCNC: 0.27 NG/ML
PROT SERPL-MCNC: 6.7 G/DL (ref 6–8.4)
RBC # BLD AUTO: 4.71 M/UL (ref 4–5.4)
SARS-COV-2 RDRP RESP QL NAA+PROBE: NEGATIVE
SCHISTOCYTES BLD QL SMEAR: ABNORMAL
SODIUM SERPL-SCNC: 143 MMOL/L (ref 136–145)
SPECIMEN SOURCE: NORMAL
TROPONIN I SERPL DL<=0.01 NG/ML-MCNC: 0.65 NG/ML (ref 0–0.03)
WBC # BLD AUTO: 5.12 K/UL (ref 3.9–12.7)

## 2023-02-20 PROCEDURE — 84145 PROCALCITONIN (PCT): CPT | Mod: NTX | Performed by: STUDENT IN AN ORGANIZED HEALTH CARE EDUCATION/TRAINING PROGRAM

## 2023-02-20 PROCEDURE — 25000003 PHARM REV CODE 250: Mod: NTX | Performed by: STUDENT IN AN ORGANIZED HEALTH CARE EDUCATION/TRAINING PROGRAM

## 2023-02-20 PROCEDURE — 94760 N-INVAS EAR/PLS OXIMETRY 1: CPT | Mod: NTX

## 2023-02-20 PROCEDURE — 84484 ASSAY OF TROPONIN QUANT: CPT | Mod: NTX | Performed by: STUDENT IN AN ORGANIZED HEALTH CARE EDUCATION/TRAINING PROGRAM

## 2023-02-20 PROCEDURE — 93010 EKG 12-LEAD: ICD-10-PCS | Mod: NTX,,, | Performed by: INTERNAL MEDICINE

## 2023-02-20 PROCEDURE — 11000001 HC ACUTE MED/SURG PRIVATE ROOM: Mod: NTX

## 2023-02-20 PROCEDURE — U0002 COVID-19 LAB TEST NON-CDC: HCPCS | Mod: NTX | Performed by: STUDENT IN AN ORGANIZED HEALTH CARE EDUCATION/TRAINING PROGRAM

## 2023-02-20 PROCEDURE — 85025 COMPLETE CBC W/AUTO DIFF WBC: CPT | Mod: NTX | Performed by: STUDENT IN AN ORGANIZED HEALTH CARE EDUCATION/TRAINING PROGRAM

## 2023-02-20 PROCEDURE — 63600175 PHARM REV CODE 636 W HCPCS: Mod: NTX | Performed by: STUDENT IN AN ORGANIZED HEALTH CARE EDUCATION/TRAINING PROGRAM

## 2023-02-20 PROCEDURE — 27000221 HC OXYGEN, UP TO 24 HOURS: Mod: NTX

## 2023-02-20 PROCEDURE — 99291 CRITICAL CARE FIRST HOUR: CPT | Mod: 25,NTX

## 2023-02-20 PROCEDURE — 87502 INFLUENZA DNA AMP PROBE: CPT | Mod: NTX | Performed by: STUDENT IN AN ORGANIZED HEALTH CARE EDUCATION/TRAINING PROGRAM

## 2023-02-20 PROCEDURE — 87651 STREP A DNA AMP PROBE: CPT | Mod: NTX | Performed by: STUDENT IN AN ORGANIZED HEALTH CARE EDUCATION/TRAINING PROGRAM

## 2023-02-20 PROCEDURE — 96374 THER/PROPH/DIAG INJ IV PUSH: CPT | Mod: NTX

## 2023-02-20 PROCEDURE — 96375 TX/PRO/DX INJ NEW DRUG ADDON: CPT | Mod: NTX

## 2023-02-20 PROCEDURE — 93005 ELECTROCARDIOGRAM TRACING: CPT | Mod: NTX

## 2023-02-20 PROCEDURE — 36415 COLL VENOUS BLD VENIPUNCTURE: CPT | Mod: NTX | Performed by: STUDENT IN AN ORGANIZED HEALTH CARE EDUCATION/TRAINING PROGRAM

## 2023-02-20 PROCEDURE — 83880 ASSAY OF NATRIURETIC PEPTIDE: CPT | Mod: NTX | Performed by: STUDENT IN AN ORGANIZED HEALTH CARE EDUCATION/TRAINING PROGRAM

## 2023-02-20 PROCEDURE — 93010 ELECTROCARDIOGRAM REPORT: CPT | Mod: NTX,,, | Performed by: INTERNAL MEDICINE

## 2023-02-20 PROCEDURE — 80053 COMPREHEN METABOLIC PANEL: CPT | Mod: NTX | Performed by: STUDENT IN AN ORGANIZED HEALTH CARE EDUCATION/TRAINING PROGRAM

## 2023-02-20 RX ORDER — ONDANSETRON 8 MG/1
8 TABLET, ORALLY DISINTEGRATING ORAL EVERY 8 HOURS PRN
Status: DISCONTINUED | OUTPATIENT
Start: 2023-02-20 | End: 2023-02-22 | Stop reason: HOSPADM

## 2023-02-20 RX ORDER — ONDANSETRON 2 MG/ML
4 INJECTION INTRAMUSCULAR; INTRAVENOUS
Status: COMPLETED | OUTPATIENT
Start: 2023-02-20 | End: 2023-02-20

## 2023-02-20 RX ORDER — METOPROLOL SUCCINATE 50 MG/1
50 TABLET, EXTENDED RELEASE ORAL DAILY
Status: DISCONTINUED | OUTPATIENT
Start: 2023-02-21 | End: 2023-02-22

## 2023-02-20 RX ORDER — TALC
6 POWDER (GRAM) TOPICAL NIGHTLY PRN
Status: DISCONTINUED | OUTPATIENT
Start: 2023-02-20 | End: 2023-02-22 | Stop reason: HOSPADM

## 2023-02-20 RX ORDER — SODIUM CHLORIDE 0.9 % (FLUSH) 0.9 %
10 SYRINGE (ML) INJECTION
Status: DISCONTINUED | OUTPATIENT
Start: 2023-02-20 | End: 2023-02-22 | Stop reason: HOSPADM

## 2023-02-20 RX ORDER — ACETAMINOPHEN 325 MG/1
650 TABLET ORAL EVERY 8 HOURS PRN
Status: DISCONTINUED | OUTPATIENT
Start: 2023-02-20 | End: 2023-02-22 | Stop reason: HOSPADM

## 2023-02-20 RX ORDER — ATORVASTATIN CALCIUM 40 MG/1
40 TABLET, FILM COATED ORAL NIGHTLY
Status: DISCONTINUED | OUTPATIENT
Start: 2023-02-20 | End: 2023-02-22 | Stop reason: HOSPADM

## 2023-02-20 RX ORDER — FUROSEMIDE 10 MG/ML
120 INJECTION INTRAMUSCULAR; INTRAVENOUS
Status: COMPLETED | OUTPATIENT
Start: 2023-02-20 | End: 2023-02-20

## 2023-02-20 RX ADMIN — ATORVASTATIN CALCIUM 40 MG: 40 TABLET, FILM COATED ORAL at 11:02

## 2023-02-20 RX ADMIN — ONDANSETRON HYDROCHLORIDE 4 MG: 2 SOLUTION INTRAMUSCULAR; INTRAVENOUS at 10:02

## 2023-02-20 RX ADMIN — APIXABAN 5 MG: 5 TABLET, FILM COATED ORAL at 11:02

## 2023-02-20 RX ADMIN — FUROSEMIDE 120 MG: 10 INJECTION, SOLUTION INTRAMUSCULAR; INTRAVENOUS at 10:02

## 2023-02-20 NOTE — PROGRESS NOTES
Pt is unreachable. Encounter will be closed, and pt should be contacted if/when he/she comes back to the ER again for a F/U call.    Beulah Figueroa  ED Navigator- [place]  [phone]

## 2023-02-21 LAB
ALBUMIN SERPL BCP-MCNC: 3.6 G/DL (ref 3.5–5.2)
ALP SERPL-CCNC: 54 U/L (ref 55–135)
ALT SERPL W/O P-5'-P-CCNC: 35 U/L (ref 10–44)
ANION GAP SERPL CALC-SCNC: 10 MMOL/L (ref 8–16)
ANISOCYTOSIS BLD QL SMEAR: SLIGHT
AST SERPL-CCNC: 23 U/L (ref 10–40)
BASOPHILS # BLD AUTO: 0.03 K/UL (ref 0–0.2)
BASOPHILS NFR BLD: 0.7 % (ref 0–1.9)
BILIRUB SERPL-MCNC: 1.6 MG/DL (ref 0.1–1)
BUN SERPL-MCNC: 35 MG/DL (ref 6–20)
CALCIUM SERPL-MCNC: 8.9 MG/DL (ref 8.7–10.5)
CHLORIDE SERPL-SCNC: 109 MMOL/L (ref 95–110)
CO2 SERPL-SCNC: 22 MMOL/L (ref 23–29)
CREAT SERPL-MCNC: 2.1 MG/DL (ref 0.5–1.4)
DACRYOCYTES BLD QL SMEAR: ABNORMAL
DIFFERENTIAL METHOD: ABNORMAL
EOSINOPHIL # BLD AUTO: 0.1 K/UL (ref 0–0.5)
EOSINOPHIL NFR BLD: 2.5 % (ref 0–8)
ERYTHROCYTE [DISTWIDTH] IN BLOOD BY AUTOMATED COUNT: 31.1 % (ref 11.5–14.5)
EST. GFR  (NO RACE VARIABLE): 27 ML/MIN/1.73 M^2
GIANT PLATELETS BLD QL SMEAR: PRESENT
GLUCOSE SERPL-MCNC: 86 MG/DL (ref 70–110)
HCT VFR BLD AUTO: 27.4 % (ref 37–48.5)
HGB BLD-MCNC: 8 G/DL (ref 12–16)
HYPOCHROMIA BLD QL SMEAR: ABNORMAL
IMM GRANULOCYTES # BLD AUTO: 0.03 K/UL (ref 0–0.04)
IMM GRANULOCYTES NFR BLD AUTO: 0.7 % (ref 0–0.5)
LYMPHOCYTES # BLD AUTO: 1.1 K/UL (ref 1–4.8)
LYMPHOCYTES NFR BLD: 24.3 % (ref 18–48)
MAGNESIUM SERPL-MCNC: 1.9 MG/DL (ref 1.6–2.6)
MCH RBC QN AUTO: 17.1 PG (ref 27–31)
MCHC RBC AUTO-ENTMCNC: 29.2 G/DL (ref 32–36)
MCV RBC AUTO: 58 FL (ref 82–98)
MONOCYTES # BLD AUTO: 0.4 K/UL (ref 0.3–1)
MONOCYTES NFR BLD: 8.5 % (ref 4–15)
NEUTROPHILS # BLD AUTO: 2.8 K/UL (ref 1.8–7.7)
NEUTROPHILS NFR BLD: 63.3 % (ref 38–73)
NRBC BLD-RTO: 9 /100 WBC
OVALOCYTES BLD QL SMEAR: ABNORMAL
PHOSPHATE SERPL-MCNC: 3.7 MG/DL (ref 2.7–4.5)
PLATELET # BLD AUTO: 172 K/UL (ref 150–450)
PLATELET BLD QL SMEAR: ABNORMAL
PMV BLD AUTO: ABNORMAL FL (ref 9.2–12.9)
POCT GLUCOSE: 76 MG/DL (ref 70–110)
POCT GLUCOSE: 78 MG/DL (ref 70–110)
POCT GLUCOSE: 81 MG/DL (ref 70–110)
POCT GLUCOSE: 95 MG/DL (ref 70–110)
POIKILOCYTOSIS BLD QL SMEAR: SLIGHT
POLYCHROMASIA BLD QL SMEAR: ABNORMAL
POTASSIUM SERPL-SCNC: 4.2 MMOL/L (ref 3.5–5.1)
PROT SERPL-MCNC: 6.3 G/DL (ref 6–8.4)
RBC # BLD AUTO: 4.69 M/UL (ref 4–5.4)
SCHISTOCYTES BLD QL SMEAR: ABNORMAL
SODIUM SERPL-SCNC: 141 MMOL/L (ref 136–145)
TROPONIN I SERPL DL<=0.01 NG/ML-MCNC: 0.63 NG/ML (ref 0–0.03)
TROPONIN I SERPL DL<=0.01 NG/ML-MCNC: 0.65 NG/ML (ref 0–0.03)
WBC # BLD AUTO: 4.48 K/UL (ref 3.9–12.7)

## 2023-02-21 PROCEDURE — 25000003 PHARM REV CODE 250: Mod: NTX | Performed by: STUDENT IN AN ORGANIZED HEALTH CARE EDUCATION/TRAINING PROGRAM

## 2023-02-21 PROCEDURE — 25000003 PHARM REV CODE 250: Mod: NTX | Performed by: INTERNAL MEDICINE

## 2023-02-21 PROCEDURE — 84100 ASSAY OF PHOSPHORUS: CPT | Mod: NTX | Performed by: INTERNAL MEDICINE

## 2023-02-21 PROCEDURE — 11000001 HC ACUTE MED/SURG PRIVATE ROOM: Mod: NTX

## 2023-02-21 PROCEDURE — 80053 COMPREHEN METABOLIC PANEL: CPT | Mod: NTX | Performed by: STUDENT IN AN ORGANIZED HEALTH CARE EDUCATION/TRAINING PROGRAM

## 2023-02-21 PROCEDURE — 99900035 HC TECH TIME PER 15 MIN (STAT): Mod: NTX

## 2023-02-21 PROCEDURE — 85025 COMPLETE CBC W/AUTO DIFF WBC: CPT | Mod: NTX | Performed by: STUDENT IN AN ORGANIZED HEALTH CARE EDUCATION/TRAINING PROGRAM

## 2023-02-21 PROCEDURE — 94640 AIRWAY INHALATION TREATMENT: CPT | Mod: NTX

## 2023-02-21 PROCEDURE — 63600175 PHARM REV CODE 636 W HCPCS: Mod: NTX | Performed by: INTERNAL MEDICINE

## 2023-02-21 PROCEDURE — 25000242 PHARM REV CODE 250 ALT 637 W/ HCPCS: Mod: NTX | Performed by: INTERNAL MEDICINE

## 2023-02-21 PROCEDURE — 94761 N-INVAS EAR/PLS OXIMETRY MLT: CPT | Mod: NTX

## 2023-02-21 PROCEDURE — 99223 1ST HOSP IP/OBS HIGH 75: CPT | Mod: NTX,,, | Performed by: INTERNAL MEDICINE

## 2023-02-21 PROCEDURE — 99223 PR INITIAL HOSPITAL CARE,LEVL III: ICD-10-PCS | Mod: NTX,,, | Performed by: INTERNAL MEDICINE

## 2023-02-21 PROCEDURE — 27000221 HC OXYGEN, UP TO 24 HOURS: Mod: NTX

## 2023-02-21 PROCEDURE — 83735 ASSAY OF MAGNESIUM: CPT | Mod: NTX | Performed by: INTERNAL MEDICINE

## 2023-02-21 PROCEDURE — 84484 ASSAY OF TROPONIN QUANT: CPT | Mod: NTX | Performed by: INTERNAL MEDICINE

## 2023-02-21 PROCEDURE — 36415 COLL VENOUS BLD VENIPUNCTURE: CPT | Mod: NTX | Performed by: INTERNAL MEDICINE

## 2023-02-21 RX ORDER — ALPRAZOLAM 0.25 MG/1
0.25 TABLET ORAL 2 TIMES DAILY PRN
Status: DISCONTINUED | OUTPATIENT
Start: 2023-02-21 | End: 2023-02-22 | Stop reason: HOSPADM

## 2023-02-21 RX ORDER — GUAIFENESIN 600 MG/1
600 TABLET, EXTENDED RELEASE ORAL 2 TIMES DAILY
Status: DISCONTINUED | OUTPATIENT
Start: 2023-02-21 | End: 2023-02-22 | Stop reason: HOSPADM

## 2023-02-21 RX ORDER — IPRATROPIUM BROMIDE AND ALBUTEROL SULFATE 2.5; .5 MG/3ML; MG/3ML
3 SOLUTION RESPIRATORY (INHALATION) 2 TIMES DAILY
Status: DISCONTINUED | OUTPATIENT
Start: 2023-02-21 | End: 2023-02-21

## 2023-02-21 RX ORDER — ALLOPURINOL 100 MG/1
100 TABLET ORAL DAILY PRN
Status: DISCONTINUED | OUTPATIENT
Start: 2023-02-21 | End: 2023-02-22 | Stop reason: HOSPADM

## 2023-02-21 RX ORDER — BENZONATATE 100 MG/1
200 CAPSULE ORAL EVERY 8 HOURS PRN
Status: DISCONTINUED | OUTPATIENT
Start: 2023-02-21 | End: 2023-02-22 | Stop reason: HOSPADM

## 2023-02-21 RX ORDER — IPRATROPIUM BROMIDE AND ALBUTEROL SULFATE 2.5; .5 MG/3ML; MG/3ML
3 SOLUTION RESPIRATORY (INHALATION) EVERY 12 HOURS
Status: DISCONTINUED | OUTPATIENT
Start: 2023-02-21 | End: 2023-02-22 | Stop reason: HOSPADM

## 2023-02-21 RX ORDER — PREGABALIN 25 MG/1
25 CAPSULE ORAL 2 TIMES DAILY
Status: DISCONTINUED | OUTPATIENT
Start: 2023-02-21 | End: 2023-02-22 | Stop reason: HOSPADM

## 2023-02-21 RX ORDER — FUROSEMIDE 10 MG/ML
80 INJECTION INTRAMUSCULAR; INTRAVENOUS
Status: DISCONTINUED | OUTPATIENT
Start: 2023-02-21 | End: 2023-02-22

## 2023-02-21 RX ADMIN — ACETAMINOPHEN 650 MG: 325 TABLET ORAL at 09:02

## 2023-02-21 RX ADMIN — ISOSORBIDE DINITRATE 30 MG: 10 TABLET ORAL at 09:02

## 2023-02-21 RX ADMIN — IPRATROPIUM BROMIDE AND ALBUTEROL SULFATE 3 ML: 2.5; .5 SOLUTION RESPIRATORY (INHALATION) at 12:02

## 2023-02-21 RX ADMIN — SACUBITRIL AND VALSARTAN 2 TABLET: 49; 51 TABLET, FILM COATED ORAL at 09:02

## 2023-02-21 RX ADMIN — ISOSORBIDE DINITRATE 30 MG: 10 TABLET ORAL at 03:02

## 2023-02-21 RX ADMIN — SACUBITRIL AND VALSARTAN 2 TABLET: 49; 51 TABLET, FILM COATED ORAL at 11:02

## 2023-02-21 RX ADMIN — ONDANSETRON 8 MG: 8 TABLET, ORALLY DISINTEGRATING ORAL at 09:02

## 2023-02-21 RX ADMIN — FUROSEMIDE 80 MG: 10 INJECTION, SOLUTION INTRAMUSCULAR; INTRAVENOUS at 09:02

## 2023-02-21 RX ADMIN — IPRATROPIUM BROMIDE AND ALBUTEROL SULFATE 3 ML: 2.5; .5 SOLUTION RESPIRATORY (INHALATION) at 07:02

## 2023-02-21 RX ADMIN — APIXABAN 5 MG: 5 TABLET, FILM COATED ORAL at 09:02

## 2023-02-21 RX ADMIN — METOPROLOL SUCCINATE 50 MG: 50 TABLET, EXTENDED RELEASE ORAL at 08:02

## 2023-02-21 RX ADMIN — ATORVASTATIN CALCIUM 40 MG: 40 TABLET, FILM COATED ORAL at 09:02

## 2023-02-21 RX ADMIN — FUROSEMIDE 80 MG: 10 INJECTION, SOLUTION INTRAMUSCULAR; INTRAVENOUS at 11:02

## 2023-02-21 RX ADMIN — GUAIFENESIN 600 MG: 600 TABLET, EXTENDED RELEASE ORAL at 09:02

## 2023-02-21 RX ADMIN — APIXABAN 5 MG: 5 TABLET, FILM COATED ORAL at 08:02

## 2023-02-21 NOTE — HPI
HPI:     Hafsa Hawley is a 57 y.o. female   with a PMHx of NICM HFrEF (EF 10%) s/p AICD placement, Paroxysmal A Fib on Eliquis, Pulmonary Hypertension, Hypertension, Type 2 Diabetes Mellitus presenting with shortness of breath. She states that her dyspnea started a few days ago which she has been attempting to manage at home, but has had progressive worsening over the course of few days .  Her lasix was recently changed to bumex . She diuresed well with IV lasix

## 2023-02-21 NOTE — PLAN OF CARE
VS stable. Pt free and safe from falls. Pt refused pregabalin. She would like to speak to the provider regarding side effects, prior to starting this medication.   Problem: Fluid Imbalance (Heart Failure)  Goal: Fluid Balance  Outcome: Ongoing, Progressing     Problem: Diabetes Comorbidity  Goal: Blood Glucose Level Within Targeted Range  Outcome: Ongoing, Progressing     Problem: Respiratory Compromise (Heart Failure)  Goal: Effective Oxygenation and Ventilation  Outcome: Ongoing, Progressing

## 2023-02-21 NOTE — PLAN OF CARE
Spoke with Dr. Duran about consult this morning. MD stated he is off today would see pt tomorrow. Will relay message to morning staff.

## 2023-02-21 NOTE — H&P
[facility] ([address] [place] [address] Medicine  History & Physical    Patient Name: Hafsa Hawley  MRN: [id number]  Patient Class: IP- Inpatient  Admission Date: 2/20/2023  Attending Physician: Ines Fonseca MD   Primary Care Provider: Cristobal Ann MD         Patient information was obtained from patient, past medical records and ER records.     Subjective:     Principal Problem:CHF     Chief Complaint:   Chief Complaint   Patient presents with    General Illness     Patient to ER CC of coughing, chills, body aches, sore throat, and headache for 3 days         HPI: HPI:     Hafsa Hawley is a 57 y.o. female   with a PMHx of NICM HFrEF (EF 10%) s/p AICD placement, Paroxysmal A Fib on Eliquis, Pulmonary Hypertension, Hypertension, Type 2 Diabetes Mellitus presenting with shortness of breath. She states that her dyspnea started a few days ago which she has been attempting to manage at home, but has had progressive worsening over the course of few days .  Her lasix was recently changed to bumex . She diuresed well with IV lasix             Past Medical History:   Diagnosis Date    Anemia     Arthritis     Atrial fibrillation     OAC    Chronic respiratory failure with hypoxia, on home oxygen therapy     2L with activity, off at rest.  Per Pulm  no overt evidence of ILD or COPD on PFTs and CT to explain O2 needs.    CKD (chronic kidney disease), stage IV 05/08/2018    Congestive heart failure     s/p AICD placement,    Deep vein thrombosis     Depression     elevated bilirubin d/t Gilbert's syndrome     confirmed by [facility] genetic testing, evaluated by hepatology    Encounter for blood transfusion     GERD (gastroesophageal reflux disease)     Hypertension     Pheochromocytoma, malignant     Right kidney mass     Sleep apnea     Thalassemia trait, alpha     Thyroid disease     Type 2 diabetes mellitus with hyperglycemia, without long-term current use of insulin 08/13/2020       Past Surgical  "History:   Procedure Laterality Date    APPENDECTOMY      BONE MARROW BIOPSY      CARDIAC DEFIBRILLATOR PLACEMENT Left 12/2016    CARDIAC ELECTROPHYSIOLOGY MAPPING AND ABLATION      CARDIAC ELECTROPHYSIOLOGY MAPPING AND ABLATION      COLONOSCOPY N/A 5/6/2022    Procedure: COLONOSCOPY;  Surgeon: Arely Betancourt MD;  Location: [facility] (2[facility]);  Service: Endoscopy;  Laterality: N/A;  heart transplant candidate/ EF 25% - 2nd floor/ defib - [facility] - [facility]  Eliquis - per Dr. Cortez with [facility] [place], Pt ok to hold Eliquis x 2 days prior-see media tab-outside correspondence dated 12/30/21  - [facility]  verbal instructions/portal instructions/email instructions - s    EYE SURGERY      due to running tears    FRACTURE SURGERY Left     hand 5th digit    HYSTERECTOMY      KNEE SURGERY Left 2016    hematoma    LIVER BIOPSY  10/24/2018    Minimal steatosis, predominantly macrovesicular, 1%, Minimal nonspecific portal inflammation, no fibrosis. No findings on biopsy to explain elevated bilirubin levels. Could be d/t Gilbert's =?- hemolysis    RIGHT HEART CATHETERIZATION Right 12/07/2021    Procedure: INSERTION, CATHETER, RIGHT HEART;  Surgeon: Irving Cardenas MD;  Location: [facility] CATH LAB;  Service: Cardiology;  Laterality: Right;    RIGHT HEART CATHETERIZATION Right 12/19/2022    Procedure: INSERTION, CATHETER, RIGHT HEART;  Surgeon: Burke Camilo MD;  Location: [facility] CATH LAB;  Service: Cardiology;  Laterality: Right;    TRANSJUGULAR BIOPSY OF LIVER N/A 10/24/2018    Procedure: BIOPSY, LIVER, TRANSJUGULAR APPROACH;  Surgeon: Carmen Diagnostic Provider;  Location: [facility] OR [address] [place] [address];  Service: Radiology;  Laterality: N/A;       Review of patient's allergies indicates:   Allergen Reactions    Penicillins Hives and Other (See Comments)    Iodinated contrast media Nausea And Vomiting    Oxycodone-acetaminophen Other (See Comments)     Nausea, Dizziness, Anxiety.  "I don't like how it makes me feel."   Given " Hydromorphone 0.5mg IVP  Without problems.  Other reaction(s): Other (See Comments)    Clonazepam Other (See Comments)    Diovan hct [valsartan-hydrochlorothiazide] Other (See Comments)     Causes coughing    Iodine Other (See Comments)    Irinotecan      Pt has homozygosity for the TA7 promoter variant that places this individual at significantly increased risk for   severe neutropenia (grade 4) when treated with the standard dose of irinotecan (risk approximately 50%).   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 Metabolism of other drugs not listed here may also be impacted by UGT1A1 enzyme activity.       Tramadol Nausea And Vomiting and Other (See Comments)     Other reaction(s): Other (See Comments)    Valsartan Other (See Comments)       Current Facility-Administered Medications on File Prior to Encounter   Medication    0.9%  NaCl infusion    vancomycin in dextrose 5 % 1 gram/250 mL IVPB 1,000 mg     Current Outpatient Medications on File Prior to Encounter   Medication Sig    albuterol-ipratropium (DUO-NEB) 2.5 mg-0.5 mg/3 mL nebulizer solution Take 3 mLs by nebulization 2 (two) times a day.    allopurinoL (ZYLOPRIM) 100 MG tablet Take 1 tablet (100 mg total) by mouth daily as needed (gout attack).    ALPRAZolam (XANAX) 2 MG Tab Take 0.25-2 mg by mouth 2 (two) times daily as needed (anxiety).    apixaban (ELIQUIS) 5 mg Tab Take 1 tablet (5 mg total) by mouth 2 (two) times daily.    atorvastatin (LIPITOR) 40 MG tablet Take 1 tablet (40 mg total) by mouth every evening.    b complex vitamins tablet Take 1 tablet by mouth once daily.    bumetanide (BUMEX) 2 MG tablet Take 2 tablets (4 mg total) by mouth 2 (two) times a day.    diclofenac sodium (VOLTAREN) 1 % Gel APPLY 2 G TOPICALLY 3 (THREE) TIMES DAILY AS NEEDED (PAIN).    empagliflozin (JARDIANCE) 25 mg tablet Take 25 mg  by mouth once daily.    ferrous sulfate 325 (65 FE) MG EC tablet Take 1 tablet (325 mg total) by mouth once daily.    fluticasone propionate (FLONASE) 50 mcg/actuation nasal spray 2 sprays by Each Nostril route daily as needed for Rhinitis.     glimepiride (AMARYL) 2 MG tablet Take 0.5 tablets (1 mg total) by mouth once daily.    isosorbide dinitrate (ISORDIL) 30 MG Tab Take 1 tablet (30 mg total) by mouth 3 (three) times daily.    LIDOcaine (LIDODERM) 5 % Place 1 patch onto the skin daily as needed (pain). Remove & Discard patch within 12 hours or as directed by MD    metoprolol succinate (TOPROL-XL) 50 MG 24 hr tablet Take 1 tablet (50 mg total) by mouth once daily.    mometasone-formoterol (DULERA) 200-5 mcg/actuation inhaler Inhale 2 puffs into the lungs 2 (two) times daily. Controller    NITROSTAT 0.4 mg SL tablet Take 2.5 tablets (1 mg total) by mouth every 5 (five) minutes as needed for Chest pain. No more than 3 tablets in 15 minutes.    ondansetron (ZOFRAN-ODT) 4 MG TbDL Take 1 tablet (4 mg total) by mouth every 12 (twelve) hours as needed (nausea).    pantoprazole (PROTONIX) 40 MG tablet TAKE 1 TABLET BY MOUTH DAILY IN THE MORNING    potassium chloride (MICRO-K) 10 MEQ CpSR Take 1 capsule (10 mEq total) by mouth once daily.    rOPINIRole (REQUIP) 4 MG tablet Take 1 tablet (4 mg total) by mouth once daily. Pt taking 4mg daily    sacubitriL-valsartan (ENTRESTO)  mg per tablet Take 1 tablet by mouth 2 (two) times daily.    scopolamine (TRANSDERM-SCOP) 1.3-1.5 mg (1 mg over 3 days) Place 1 patch onto the skin every 72 hours as needed (nausea).    tiotropium bromide (SPIRIVA RESPIMAT) 1.25 mcg/actuation inhaler Inhale 2 puffs into the lungs once daily. Controller    VENTOLIN HFA 90 mcg/actuation inhaler Inhale 2 puffs into the lungs every 6 (six) hours as needed for Shortness of Breath or Wheezing.    blood sugar diagnostic (ACCU-CHEK GUIDE TEST STRIPS) Strp 1 strip by Other route 3  (three) times daily.    cetirizine (ZYRTEC) 10 MG tablet Take 10 mg by mouth daily as needed for Allergies.    ergocalciferol (ERGOCALCIFEROL) 50,000 unit Cap Take 1 capsule (50,000 Units total) by mouth every 7 days. (Patient taking differently: Take 50,000 Units by mouth every Saturday.)    lancets (ACCU-CHEK SOFTCLIX LANCETS) Misc 1 Device by Misc.(Non-Drug; Combo Route) route 3 (three) times daily.    pregabalin (LYRICA) 25 MG capsule Take 1 capsule (25 mg total) by mouth 2 (two) times daily.    promethazine-codeine 6.25-10 mg/5 ml (PHENERGAN WITH CODEINE) 6.25-10 mg/5 mL syrup Take 5 mLs by mouth every 4 - 6 hours as needed.     Family History       Problem Relation (Age of Onset)    Cancer Mother    Cirrhosis Brother    Diabetes Brother    Heart attack Father    Heart disease Father, Sister, Brother, Sister, Brother    Hypertension Father, Brother          Tobacco Use    Smoking status: Never    Smokeless tobacco: Never   Substance and Sexual Activity    Alcohol use: Yes     Comment: up to 1 yr ago drank 2-3 drinks on occasion but sporadic    Drug use: No    Sexual activity: Yes     Partners: Male     Review of Systems   Constitutional:  Positive for fatigue. Negative for appetite change and fever.   HENT:  Negative for congestion, ear pain, postnasal drip, rhinorrhea and sinus pressure.    Eyes:  Negative for photophobia, pain and visual disturbance.   Respiratory:  Positive for cough and shortness of breath. Negative for wheezing.    Cardiovascular:  Negative for chest pain, palpitations and leg swelling.   Gastrointestinal:  Negative for abdominal pain, constipation and vomiting.   Endocrine: Negative for cold intolerance and heat intolerance.   Genitourinary:  Negative for dysuria, flank pain, frequency and hematuria.   Musculoskeletal:  Negative for arthralgias and myalgias.   Skin:  Negative for pallor and rash.   Allergic/Immunologic: Negative for immunocompromised state.   Neurological:   Positive for weakness and headaches. Negative for dizziness, syncope, light-headedness and numbness.   Hematological:  Does not bruise/bleed easily.   Psychiatric/Behavioral:  Negative for agitation, confusion and dysphoric mood. The patient is not nervous/anxious.    Objective:     Vital Signs (Most Recent):  Temp: 96.6 °F (35.9 °C) (02/21/23 0735)  Pulse: 86 (02/21/23 0800)  Resp: 12 (02/21/23 0735)  BP: 119/71 (02/21/23 0840)  SpO2: 95 % (02/21/23 0800) Vital Signs (24h Range):  Temp:  [96.6 °F (35.9 °C)-98.5 °F (36.9 °C)] 96.6 °F (35.9 °C)  Pulse:  [75-95] 86  Resp:  [12-22] 12  SpO2:  [85 %-99 %] 95 %  BP: (119-144)/() 119/71     Weight: 93.8 kg (206 lb 12.7 oz)  Body mass index is 33.38 kg/m².    Physical Exam  Constitutional:       Appearance: She is obese. She is not ill-appearing.   Cardiovascular:      Rate and Rhythm: Normal rate and regular rhythm.      Pulses: Normal pulses.      Comments: Elevated JVP up to mid-neck on exam on an upright position with positive Hepatojugular reflex  Pulmonary:      Effort: Pulmonary effort is normal.      Breath sounds: Normal breath sounds.   Abdominal:      General: Bowel sounds are normal.      Palpations: Abdomen is soft.      Tenderness: There is no abdominal tenderness.   Musculoskeletal:      Right lower leg: No edema.      Left lower leg: No edema.   Skin:     General: Skin is warm.   Neurological:      Mental Status: She is alert.           Significant Labs: All pertinent labs within the past 24 hours have been reviewed.  ABGs: No results for input(s): PH, PCO2, HCO3, POCSATURATED, BE, TOTALHB, COHB, METHB, O2HB, POCFIO2, PO2 in the last 48 hours.  CBC:   Recent Labs   Lab 02/20/23 2026 02/21/23  0204   WBC 5.12 4.48   HGB 8.5* 8.0*   HCT 28.4* 27.4*    172     CMP:   Recent Labs   Lab 02/20/23 2026 02/21/23  0204    141   K 4.3 4.2    109   CO2 21* 22*   * 86   BUN 36* 35*   CREATININE 2.1* 2.1*   CALCIUM 9.3 8.9   PROT 6.7  6.3   ALBUMIN 3.7 3.6   BILITOT 1.5* 1.6*   ALKPHOS 57 54*   AST 22 23   ALT 36 35   ANIONGAP 12 10     Coagulation: No results for input(s): PT, INR, APTT in the last 48 hours.  Lactic Acid: No results for input(s): LACTATE in the last 48 hours.  Troponin:   Recent Labs   Lab 02/20/23 2026 02/21/23  0204 02/21/23  0804   TROPONINI 0.646* 0.629* 0.645*   Normal sinus rhythm Possible Left atrial enlargement Left anterior fascicular block LVH with repolarization abnormality Abnormal ECG When compared with ECG of 30-JAN-2023 08:52, No significant change was found 25mm/s 10mm/mV 150Hz 9.0.7 12SL 241 HD JACIEL: 997 Referred by: JOSE MARIA COATS  Significant Imaging: I have reviewed all pertinent imaging results/findings within the past 24 hours.  CXR: I have reviewed all pertinent results/findings within the past 24 hours and my personal findings are:       Stable cardiomegaly with mild pulmonary vascular congestion.  Overall findings suggestive of mild pulmonary edema secondary to CHF.    Assessment/Plan:     elevated bilirubin d/t Gilbert's syndrome  CMP  Sodium   Date Value Ref Range Status   02/21/2023 141 136 - 145 mmol/L Final     Potassium   Date Value Ref Range Status   02/21/2023 4.2 3.5 - 5.1 mmol/L Final     Chloride   Date Value Ref Range Status   02/21/2023 109 95 - 110 mmol/L Final     CO2   Date Value Ref Range Status   02/21/2023 22 (L) 23 - 29 mmol/L Final     Glucose   Date Value Ref Range Status   02/21/2023 86 70 - 110 mg/dL Final     BUN   Date Value Ref Range Status   02/21/2023 35 (H) 6 - 20 mg/dL Final     Creatinine   Date Value Ref Range Status   02/21/2023 2.1 (H) 0.5 - 1.4 mg/dL Final     Calcium   Date Value Ref Range Status   02/21/2023 8.9 8.7 - 10.5 mg/dL Final     Total Protein   Date Value Ref Range Status   02/21/2023 6.3 6.0 - 8.4 g/dL Final     Albumin   Date Value Ref Range Status   02/21/2023 3.6 3.5 - 5.2 g/dL Final     Total Bilirubin   Date Value Ref Range Status   02/21/2023 1.6  (H) 0.1 - 1.0 mg/dL Final     Comment:     For infants and newborns, interpretation of results should be based  on gestational age, weight and in agreement with clinical  observations.    Premature Infant recommended reference ranges:  Up to 24 hours.............<8.0 mg/dL  Up to 48 hours............<12.0 mg/dL  3-5 days..................<15.0 mg/dL  6-29 days.................<15.0 mg/dL       Alkaline Phosphatase   Date Value Ref Range Status   02/21/2023 54 (L) 55 - 135 U/L Final     AST   Date Value Ref Range Status   02/21/2023 23 10 - 40 U/L Final     ALT   Date Value Ref Range Status   02/21/2023 35 10 - 44 U/L Final     Anion Gap   Date Value Ref Range Status   02/21/2023 10 8 - 16 mmol/L Final     eGFR   Date Value Ref Range Status   02/21/2023 27 (A) >60 mL/min/1.73 m^2 Final           Depression due to physical illness  Very anxious   Resume alprazolam      CKD (chronic kidney disease), stage IV    Will follow labs       Acute on chronic combined systolic and diastolic heart failure  Supplemental O2 via nasal canula; titrate O2 saturation to >92%.  Serial Cardiac enzymes × 3.  Diuretic administration. Monitor electrolytes for hypokalemia and hypomagnesemia.  Cardiology Consultation.  2-D Echocardiogram for evaluation of left ventricle systolic function : EF 10 %  Daily weights.  Strict I/Os.  Fluid restriction.  Na restriction <2g/d.    IV lasix today       Paroxysmal atrial fibrillation  Patient with Paroxysmal (<7 days) atrial fibrillation which is controlled currently with Beta Blocker. Patient is currently in sinus rhythm.Anticoagulation indicated. Anticoagulation done with eliquis.        Essential hypertension    Resume enteresto and metoprolol      VTE Risk Mitigation (From admission, onward)         Ordered     apixaban tablet 5 mg  2 times daily         02/20/23 2316     IP VTE HIGH RISK PATIENT  Once         02/20/23 2338     Place sequential compression device  Until discontinued          02/20/23 2338     Place LILIANA hose  Until discontinued         02/20/23 2338                   Lisa Rodarte MD  Department of Hospital Medicine   [place] - [facility] Surg (3rd Fl)

## 2023-02-21 NOTE — ED PROVIDER NOTES
"Encounter Date: 2/20/2023    This document was partially completed using speech recognition software and may contain misspellings, grammatical errors, and/or unexpected word substitutions.       History     Chief Complaint   Patient presents with    General Illness     Patient to ER CC of coughing, chills, body aches, sore throat, and headache for 3 days      57 year old female with a PMHx of CHF since 2013 due to NICM, pulmonary hypertension, DM, HTN, permanent AF, and ICD presents to the ED with cough, sore throat, shortness of breath. Symptoms started 3 days ago. Reports lots of mucus when she coughs, nonbloody. Has oxygen at home when needed. Patient reports chest pain when she coughs. Has a mild headache as well. Reports shortness of breath - she states it's been on and off. She is currently being evaluated for a heart transplant. She is on eliquis and hasn't skipped any dosages.     Patient is 85% on room air.    11/17/2022 Echo  ·The left ventricle is severely enlarged with eccentric hypertrophy and severely decreased systolic function. The estimated ejection fraction is 10%.  ·The right ventricle is not well visualized but appears normal in size with moderately reduced systolic function.  ·Grade II left ventricular diastolic dysfunction.  ·Biatrial enlargement.  ·The estimated PA systolic pressure is 65 mmHg.  ·Elevated central venous pressure (15 mmHg).  ·Small posterolateral pericardial effusion.        Review of patient's allergies indicates:   Allergen Reactions    Penicillins Hives and Other (See Comments)    Iodinated contrast media Nausea And Vomiting    Oxycodone-acetaminophen Other (See Comments)     Nausea, Dizziness, Anxiety.  "I don't like how it makes me feel."   Given Hydromorphone 0.5mg IVP  Without problems.  Other reaction(s): Other (See Comments)    Clonazepam Other (See Comments)    Diovan hct [valsartan-hydrochlorothiazide] Other (See Comments)     Causes coughing    Iodine Other (See " Comments)    Irinotecan      Pt has homozygosity for the TA7 promoter variant that places this individual at significantly increased risk for   severe neutropenia (grade 4) when treated with the standard dose of irinotecan (risk approximately 50%).   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 Metabolism of other drugs not listed here may also be impacted by UGT1A1 enzyme activity.       Tramadol Nausea And Vomiting and Other (See Comments)     Other reaction(s): Other (See Comments)    Valsartan Other (See Comments)     Past Medical History:   Diagnosis Date    Anemia     Arthritis     Atrial fibrillation     OAC    Chronic respiratory failure with hypoxia, on home oxygen therapy     2L with activity, off at rest.  Per Pulm  no overt evidence of ILD or COPD on PFTs and CT to explain O2 needs.    CKD (chronic kidney disease), stage IV 05/08/2018    Congestive heart failure     s/p AICD placement,    Deep vein thrombosis     Depression     elevated bilirubin d/t Gilbert's syndrome     confirmed by [facility] genetic testing, evaluated by hepatology    Encounter for blood transfusion     GERD (gastroesophageal reflux disease)     Hypertension     Pheochromocytoma, malignant     Right kidney mass     Sleep apnea     Thalassemia trait, alpha     Thyroid disease     Type 2 diabetes mellitus with hyperglycemia, without long-term current use of insulin 08/13/2020     Past Surgical History:   Procedure Laterality Date    APPENDECTOMY      BONE MARROW BIOPSY      CARDIAC DEFIBRILLATOR PLACEMENT Left 12/2016    CARDIAC ELECTROPHYSIOLOGY MAPPING AND ABLATION      CARDIAC ELECTROPHYSIOLOGY MAPPING AND ABLATION      COLONOSCOPY N/A 5/6/2022    Procedure: COLONOSCOPY;  Surgeon: Arely Betancourt MD;  Location: [facility] ([address] [place] [address]);  Service: Endoscopy;  Laterality: N/A;  heart transplant candidate/ EF 25% - 2nd floor/  defib - Biotronik - ERW  Eliquis - per Dr. Cortez with [facility], Pt ok to hold Eliquis x 2 days prior-see media tab-outside correspondence dated 21  - ERW  verbal instructions/portal instructions/email instructions - s    EYE SURGERY      due to running tears    FRACTURE SURGERY Left     hand 5th digit    HYSTERECTOMY      KNEE SURGERY Left 2016    hematoma    LIVER BIOPSY  10/24/2018    Minimal steatosis, predominantly macrovesicular, 1%, Minimal nonspecific portal inflammation, no fibrosis. No findings on biopsy to explain elevated bilirubin levels. Could be d/t Gilbert's =?- hemolysis    RIGHT HEART CATHETERIZATION Right 2021    Procedure: INSERTION, CATHETER, RIGHT HEART;  Surgeon: Irving Cardenas MD;  Location: [facility] CATH LAB;  Service: Cardiology;  Laterality: Right;    RIGHT HEART CATHETERIZATION Right 2022    Procedure: INSERTION, CATHETER, RIGHT HEART;  Surgeon: Burke Camilo MD;  Location: [facility] CATH LAB;  Service: Cardiology;  Laterality: Right;    TRANSJUGULAR BIOPSY OF LIVER N/A 10/24/2018    Procedure: BIOPSY, LIVER, TRANSJUGULAR APPROACH;  [facility];  Location: [facility] OR [address] [place] [address];  Service: Radiology;  Laterality: N/A;     Family History   Problem Relation Age of Onset    Cancer Mother         pancreatic CA early 50's    Heart disease Father          MI in late 50's    Hypertension Father     Heart attack Father     Heart disease Sister     Heart disease Brother     Cirrhosis Brother         alcoholic    Heart disease Sister     Heart disease Brother     Hypertension Brother     Diabetes Brother      Social History     Tobacco Use    Smoking status: Never    Smokeless tobacco: Never   Substance Use Topics    Alcohol use: Yes     Comment: up to 1 yr ago drank 2-3 drinks on occasion but sporadic    Drug use: No     Review of Systems   Constitutional:  Positive for chills. Negative for fever.   HENT:  Positive for sore throat. Negative for congestion,  rhinorrhea and sneezing.    Eyes:  Negative for discharge and redness.   Respiratory:  Positive for cough and shortness of breath.    Cardiovascular:  Negative for chest pain and palpitations.   Gastrointestinal:  Negative for abdominal pain, diarrhea, nausea and vomiting.   Genitourinary:  Negative for dysuria, frequency, vaginal bleeding and vaginal discharge.   Musculoskeletal:  Positive for myalgias. Negative for back pain and neck pain.   Skin:  Negative for rash and wound.   Neurological:  Positive for headaches. Negative for weakness and numbness.     Physical Exam     Initial Vitals [02/20/23 2007]   BP Pulse Resp Temp SpO2   (!) 144/102 92 18 97.9 °F (36.6 °C) (!) 91 %      MAP       --         Physical Exam    Nursing note and vitals reviewed.  Constitutional: She appears well-developed. She is not diaphoretic. No distress.   HENT:   Head: Normocephalic and atraumatic.   Right Ear: External ear normal.   Left Ear: External ear normal.   Mouth/Throat: Uvula is midline and oropharynx is clear and moist.   Eyes: Right eye exhibits no discharge. Left eye exhibits no discharge. No scleral icterus.   Neck: Neck supple.   Cardiovascular:  Normal rate and regular rhythm.           Pulmonary/Chest: Breath sounds normal. No stridor. No respiratory distress. She has no wheezes. She has no rhonchi. She has no rales.   Abdominal: Abdomen is soft. There is no abdominal tenderness. There is no guarding.   Musculoskeletal:         General: No edema.      Cervical back: Neck supple.     Neurological: She is alert and oriented to person, place, and time.   Skin: Skin is warm and dry. Capillary refill takes less than 2 seconds.   Psychiatric: She has a normal mood and affect.       ED Course   Critical Care    Date/Time: 2/20/2023 11:33 PM  Performed by: Bentley Beard DO  Authorized by: Bentley Beard DO   Direct patient critical care time: 23 minutes  Additional history critical care time: 4 minutes  Ordering / reviewing  critical care time: 3 minutes  Documentation critical care time: 5 minutes  Consulting other physicians critical care time: 3 minutes  Total critical care time (exclusive of procedural time) : 38 minutes  Critical care time was exclusive of separately billable procedures and treating other patients and teaching time.  Critical care was necessary to treat or prevent imminent or life-threatening deterioration of the following conditions: cardiac failure and respiratory failure.  Critical care was time spent personally by me on the following activities: review of old charts, re-evaluation of patient's condition, pulse oximetry, ordering and review of radiographic studies, ordering and review of laboratory studies, obtaining history from patient or surrogate, ordering and performing treatments and interventions, examination of patient, evaluation of patient's response to treatment and discussions with consultants.      Labs Reviewed   CBC W/ AUTO DIFFERENTIAL - Abnormal; Notable for the following components:       Result Value    Hemoglobin 8.5 (*)     Hematocrit 28.4 (*)     MCV 60 (*)     MCH 18.0 (*)     MCHC 29.9 (*)     RDW 29.2 (*)     Immature Granulocytes 0.8 (*)     nRBC 6 (*)     All other components within normal limits   COMPREHENSIVE METABOLIC PANEL - Abnormal; Notable for the following components:    CO2 21 (*)     Glucose 125 (*)     BUN 36 (*)     Creatinine 2.1 (*)     Total Bilirubin 1.5 (*)     eGFR 27 (*)     All other components within normal limits   TROPONIN I - Abnormal; Notable for the following components:    Troponin I 0.646 (*)     All other components within normal limits   B-TYPE NATRIURETIC PEPTIDE - Abnormal; Notable for the following components:    BNP 4,274 (*)     All other components within normal limits   PROCALCITONIN - Abnormal; Notable for the following components:    Procalcitonin 0.27 (*)     All other components within normal limits   GROUP A STREP, MOLECULAR   INFLUENZA A & B  BY MOLECULAR   SARS-COV-2 RNA AMPLIFICATION, QUAL     EKG Readings: (Independently Interpreted)   Previous EKG: Compared with most recent EKG Previous EKG Date: 1/30/2023.   NSR at 79 bpm. LAFB. LAD. No STEMI.     Imaging Results              X-Ray Chest 1 View (Final result)  Result time 02/20/23 22:18:10      Final result by Jairo Johnson MD (02/20/23 22:18:10)                   Impression:      Stable cardiomegaly with mild pulmonary vascular congestion.  Overall findings suggestive of mild pulmonary edema secondary to CHF.      Electronically signed by: Jairo Johnson MD  Date:    02/20/2023  Time:    22:18               Narrative:    EXAMINATION:  XR CHEST 1 VIEW    CLINICAL HISTORY:  Shortness of breath    TECHNIQUE:  Single frontal view of the chest was performed.    COMPARISON:  01/30/2023.    FINDINGS:  There is stable appearance of a single left-sided AICD.  Monitoring EKG leads are present.    The trachea is unremarkable.  There is stable enlargement of the cardiomediastinal silhouette.  There is no evidence of free air beneath the hemidiaphragms.  There are no pleural effusions.  There is no evidence of a pneumothorax.  There is no evidence of pneumomediastinum.  There are prominent interstitial markings.  There is no focal consolidation.  There are degenerative changes in the osseous structures.                                       Medications   apixaban tablet 5 mg (has no administration in time range)   atorvastatin tablet 40 mg (has no administration in time range)   metoprolol succinate (TOPROL-XL) 24 hr tablet 50 mg (has no administration in time range)   sodium chloride 0.9% flush 10 mL (has no administration in time range)   melatonin tablet 6 mg (has no administration in time range)   acetaminophen tablet 650 mg (has no administration in time range)   ondansetron disintegrating tablet 8 mg (has no administration in time range)   acetaminophen tablet 650 mg (has no administration in time range)    furosemide injection 120 mg (120 mg Intravenous Given 2/20/23 2214)   ondansetron injection 4 mg (4 mg Intravenous Given 2/20/23 2215)     Medical Decision Making:   Differential Diagnosis:   Differential considerations include (in no particular order): ACS, PE, CHF, COPD, Pneumothorax, Asthma, Pneumonia, Anemia, COVID-19    ED Management:  Based on the patient's evaluation - patient will need admission for acute CHF exacerbation. Unfortunately, patient has had several admissions for cardiac related pathology. She is hypoxic to the mid 80s on RA requiring nasal cannula O2. Her ED workup showed pulmonary edema on CXR, BNP 4274, elevated troponin (doubt ACS, neg ECG, patient denies chest pain and compared to prior troponins, she seems to always have elevated troponins), stable CKD, stable anemia. Given 120mg of lasix and the ED and will admit with cardiology consult. Discussed case with Dr. Fonseca who was happy to assist and accepts admission.                        Clinical Impression:   Final diagnoses:  [R06.02] Shortness of breath  [I50.9] Acute on chronic congestive heart failure, unspecified heart failure type (Primary)  [R79.89] Elevated brain natriuretic peptide (BNP) level  [R77.8] Elevated troponin  [R09.02] Hypoxia        ED Disposition Condition    Admit Stable                Bentley Beard DO  02/20/23 9545

## 2023-02-21 NOTE — PLAN OF CARE
Plan of care reviewed with pt. Pt voiced understanding. Pt AAO X 4. Pt c/o cough since arriving to the floor from the ED. No apparent distress noted. Fall precautions maintained. Pt remains free of fall or injury. Bed in lowest position, locked, and call light within reach. Side rails up x's 2 with slip resistant socks on. Pt on telemetry running NSR. Pt did have the run of v. Tach during the night but didn't have any more ectopy.   Problem: Adult Inpatient Plan of Care  Goal: Plan of Care Review  Outcome: Ongoing, Progressing  Flowsheets (Taken 2/21/2023 0503)  Plan of Care Reviewed With: patient  Goal: Absence of Hospital-Acquired Illness or Injury  Outcome: Ongoing, Progressing  Goal: Optimal Comfort and Wellbeing  Outcome: Ongoing, Progressing     Problem: Diabetes Comorbidity  Goal: Blood Glucose Level Within Targeted Range  Outcome: Ongoing, Progressing     Problem: Fall Injury Risk  Goal: Absence of Fall and Fall-Related Injury  Outcome: Ongoing, Progressing     Problem: Adjustment to Illness (Heart Failure)  Goal: Optimal Coping  Outcome: Ongoing, Progressing     Problem: Cardiac Output Decreased (Heart Failure)  Goal: Optimal Cardiac Output  Outcome: Ongoing, Progressing     Problem: Dysrhythmia (Heart Failure)  Goal: Stable Heart Rate and Rhythm  Outcome: Ongoing, Progressing     Problem: Fluid Imbalance (Heart Failure)  Goal: Fluid Balance  Outcome: Ongoing, Progressing     Problem: Functional Ability Impaired (Heart Failure)  Goal: Optimal Functional Ability  Outcome: Ongoing, Progressing     Problem: Oral Intake Inadequate (Heart Failure)  Goal: Optimal Nutrition Intake  Outcome: Ongoing, Progressing     Problem: Respiratory Compromise (Heart Failure)  Goal: Effective Oxygenation and Ventilation  Outcome: Ongoing, Progressing     Problem: Sleep Disordered Breathing (Heart Failure)  Goal: Effective Breathing Pattern During Sleep  Outcome: Ongoing, Progressing

## 2023-02-21 NOTE — ASSESSMENT & PLAN NOTE
CMP  Sodium   Date Value Ref Range Status   02/21/2023 141 136 - 145 mmol/L Final     Potassium   Date Value Ref Range Status   02/21/2023 4.2 3.5 - 5.1 mmol/L Final     Chloride   Date Value Ref Range Status   02/21/2023 109 95 - 110 mmol/L Final     CO2   Date Value Ref Range Status   02/21/2023 22 (L) 23 - 29 mmol/L Final     Glucose   Date Value Ref Range Status   02/21/2023 86 70 - 110 mg/dL Final     BUN   Date Value Ref Range Status   02/21/2023 35 (H) 6 - 20 mg/dL Final     Creatinine   Date Value Ref Range Status   02/21/2023 2.1 (H) 0.5 - 1.4 mg/dL Final     Calcium   Date Value Ref Range Status   02/21/2023 8.9 8.7 - 10.5 mg/dL Final     Total Protein   Date Value Ref Range Status   02/21/2023 6.3 6.0 - 8.4 g/dL Final     Albumin   Date Value Ref Range Status   02/21/2023 3.6 3.5 - 5.2 g/dL Final     Total Bilirubin   Date Value Ref Range Status   02/21/2023 1.6 (H) 0.1 - 1.0 mg/dL Final     Comment:     For infants and newborns, interpretation of results should be based  on gestational age, weight and in agreement with clinical  observations.    Premature Infant recommended reference ranges:  Up to 24 hours.............<8.0 mg/dL  Up to 48 hours............<12.0 mg/dL  3-5 days..................<15.0 mg/dL  6-29 days.................<15.0 mg/dL       Alkaline Phosphatase   Date Value Ref Range Status   02/21/2023 54 (L) 55 - 135 U/L Final     AST   Date Value Ref Range Status   02/21/2023 23 10 - 40 U/L Final     ALT   Date Value Ref Range Status   02/21/2023 35 10 - 44 U/L Final     Anion Gap   Date Value Ref Range Status   02/21/2023 10 8 - 16 mmol/L Final     eGFR   Date Value Ref Range Status   02/21/2023 27 (A) >60 mL/min/1.73 m^2 Final

## 2023-02-21 NOTE — PLAN OF CARE
"Pt called stating she wanted some cough syrup due to the cough making her chest hurt.   0200 called and spoke with Dr. Fonseca about pt's asking for some cough syrup. MD ordered Tessalon Pearls 200 mg Q 8 hr prn for cough. New orders placed at this time. TN went advised pt about new order that was received, and pt was upset stating that medication should be taken off the market due to it not working. Pt stating she rather take Mucinex or something else other than Tessalon Pearls. Pt offered medication several times, and pt still refused stating "I guess I will just be up all night coughing."  "

## 2023-02-21 NOTE — ASSESSMENT & PLAN NOTE
Patient with Paroxysmal (<7 days) atrial fibrillation which is controlled currently with Beta Blocker. Patient is currently in sinus rhythm.Anticoagulation indicated. Anticoagulation done with eliquis.

## 2023-02-21 NOTE — ASSESSMENT & PLAN NOTE
Supplemental O2 via nasal canula; titrate O2 saturation to >92%.  Serial Cardiac enzymes × 3.  Diuretic administration. Monitor electrolytes for hypokalemia and hypomagnesemia.  Cardiology Consultation.  2-D Echocardiogram for evaluation of left ventricle systolic function : EF 10 %  Daily weights.  Strict I/Os.  Fluid restriction.  Na restriction <2g/d.    IV lasix today

## 2023-02-21 NOTE — SUBJECTIVE & OBJECTIVE
Past Medical History:   Diagnosis Date    Anemia     Arthritis     Atrial fibrillation     OAC    Chronic respiratory failure with hypoxia, on home oxygen therapy     2L with activity, off at rest.  Per Pulm  no overt evidence of ILD or COPD on PFTs and CT to explain O2 needs.    CKD (chronic kidney disease), stage IV 05/08/2018    Congestive heart failure     s/p AICD placement,    Deep vein thrombosis     Depression     elevated bilirubin d/t Gilbert's syndrome     confirmed by [facility] genetic testing, evaluated by hepatology    Encounter for blood transfusion     GERD (gastroesophageal reflux disease)     Hypertension     Pheochromocytoma, malignant     Right kidney mass     Sleep apnea     Thalassemia trait, alpha     Thyroid disease     Type 2 diabetes mellitus with hyperglycemia, without long-term current use of insulin 08/13/2020       Past Surgical History:   Procedure Laterality Date    APPENDECTOMY      BONE MARROW BIOPSY      CARDIAC DEFIBRILLATOR PLACEMENT Left 12/2016    CARDIAC ELECTROPHYSIOLOGY MAPPING AND ABLATION      CARDIAC ELECTROPHYSIOLOGY MAPPING AND ABLATION      COLONOSCOPY N/A 5/6/2022    Procedure: COLONOSCOPY;  Surgeon: Arely Betancourt MD;  Location: [facility] ([address] [place] [address]);  Service: Endoscopy;  Laterality: N/A;  heart transplant candidate/ EF 25% - 2nd floor/ defib - Biotronik - ER[facility]  Eliquis - per Dr. Cortez with [facility], Pt ok to hold Eliquis x 2 days prior-see media tab-outside correspondence dated 12/30/21  - [facility]  verbal instructions/portal instructions/email instructions - s    EYE SURGERY      due to running tears    FRACTURE SURGERY Left     hand 5th digit    HYSTERECTOMY      KNEE SURGERY Left 2016    hematoma    LIVER BIOPSY  10/24/2018    Minimal steatosis, predominantly macrovesicular, 1%, Minimal nonspecific portal inflammation, no fibrosis. No findings on biopsy to explain elevated bilirubin levels. Could be d/t Gilbert's =?- hemolysis    RIGHT HEART CATHETERIZATION Right  "12/07/2021    Procedure: INSERTION, CATHETER, RIGHT HEART;  Surgeon: Irving Cardenas MD;  Location: [facility] CATH LAB;  Service: Cardiology;  Laterality: Right;    RIGHT HEART CATHETERIZATION Right 12/19/2022    Procedure: INSERTION, CATHETER, RIGHT HEART;  Surgeon: Burke Camilo MD;  Location: [facility] CATH LAB;  Service: Cardiology;  Laterality: Right;    TRANSJUGULAR BIOPSY OF LIVER N/A 10/24/2018    Procedure: BIOPSY, LIVER, TRANSJUGULAR APPROACH;  [facility];  Location: [facility] OR [facility] FLR;  Service: Radiology;  Laterality: N/A;       Review of patient's allergies indicates:   Allergen Reactions    Penicillins Hives and Other (See Comments)    Iodinated contrast media Nausea And Vomiting    Oxycodone-acetaminophen Other (See Comments)     Nausea, Dizziness, Anxiety.  "I don't like how it makes me feel."   Given Hydromorphone 0.5mg IVP  Without problems.  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 Metabolism of other drugs not listed here may also be impacted by UGT1A1 enzyme activity.       Tramadol Nausea And Vomiting and Other (See Comments)     Other reaction(s): Other (See Comments)    Valsartan Other (See Comments)       Current Facility-Administered Medications on File Prior to Encounter   Medication    0.9%  NaCl infusion    vancomycin in dextrose 5 % 1 gram/250 mL IVPB 1,000 mg     Current Outpatient Medications on File Prior to Encounter "   Medication Sig    albuterol-ipratropium (DUO-NEB) 2.5 mg-0.5 mg/3 mL nebulizer solution Take 3 mLs by nebulization 2 (two) times a day.    allopurinoL (ZYLOPRIM) 100 MG tablet Take 1 tablet (100 mg total) by mouth daily as needed (gout attack).    ALPRAZolam (XANAX) 2 MG Tab Take 0.25-2 mg by mouth 2 (two) times daily as needed (anxiety).    apixaban (ELIQUIS) 5 mg Tab Take 1 tablet (5 mg total) by mouth 2 (two) times daily.    atorvastatin (LIPITOR) 40 MG tablet Take 1 tablet (40 mg total) by mouth every evening.    b complex vitamins tablet Take 1 tablet by mouth once daily.    bumetanide (BUMEX) 2 MG tablet Take 2 tablets (4 mg total) by mouth 2 (two) times a day.    diclofenac sodium (VOLTAREN) 1 % Gel APPLY 2 G TOPICALLY 3 (THREE) TIMES DAILY AS NEEDED (PAIN).    empagliflozin (JARDIANCE) 25 mg tablet Take 25 mg by mouth once daily.    ferrous sulfate 325 (65 FE) MG EC tablet Take 1 tablet (325 mg total) by mouth once daily.    fluticasone propionate (FLONASE) 50 mcg/actuation nasal spray 2 sprays by Each Nostril route daily as needed for Rhinitis.     glimepiride (AMARYL) 2 MG tablet Take 0.5 tablets (1 mg total) by mouth once daily.    isosorbide dinitrate (ISORDIL) 30 MG Tab Take 1 tablet (30 mg total) by mouth 3 (three) times daily.    LIDOcaine (LIDODERM) 5 % Place 1 patch onto the skin daily as needed (pain). Remove & Discard patch within 12 hours or as directed by MD    metoprolol succinate (TOPROL-XL) 50 MG 24 hr tablet Take 1 tablet (50 mg total) by mouth once daily.    mometasone-formoterol (DULERA) 200-5 mcg/actuation inhaler Inhale 2 puffs into the lungs 2 (two) times daily. Controller    NITROSTAT 0.4 mg SL tablet Take 2.5 tablets (1 mg total) by mouth every 5 (five) minutes as needed for Chest pain. No more than 3 tablets in 15 minutes.    ondansetron (ZOFRAN-ODT) 4 MG TbDL Take 1 tablet (4 mg total) by mouth every 12 (twelve) hours as needed (nausea).    pantoprazole (PROTONIX) 40 MG tablet  TAKE 1 TABLET BY MOUTH DAILY IN THE MORNING    potassium chloride (MICRO-K) 10 MEQ CpSR Take 1 capsule (10 mEq total) by mouth once daily.    rOPINIRole (REQUIP) 4 MG tablet Take 1 tablet (4 mg total) by mouth once daily. Pt taking 4mg daily    sacubitriL-valsartan (ENTRESTO)  mg per tablet Take 1 tablet by mouth 2 (two) times daily.    scopolamine (TRANSDERM-SCOP) 1.3-1.5 mg (1 mg over 3 days) Place 1 patch onto the skin every 72 hours as needed (nausea).    tiotropium bromide (SPIRIVA RESPIMAT) 1.25 mcg/actuation inhaler Inhale 2 puffs into the lungs once daily. Controller    VENTOLIN HFA 90 mcg/actuation inhaler Inhale 2 puffs into the lungs every 6 (six) hours as needed for Shortness of Breath or Wheezing.    blood sugar diagnostic (ACCU-CHEK GUIDE TEST STRIPS) Strp 1 strip by Other route 3 (three) times daily.    cetirizine (ZYRTEC) 10 MG tablet Take 10 mg by mouth daily as needed for Allergies.    ergocalciferol (ERGOCALCIFEROL) 50,000 unit Cap Take 1 capsule (50,000 Units total) by mouth every 7 days. (Patient taking differently: Take 50,000 Units by mouth every Saturday.)    lancets (ACCU-CHEK SOFTCLIX LANCETS) Misc 1 Device by Misc.(Non-Drug; Combo Route) route 3 (three) times daily.    pregabalin (LYRICA) 25 MG capsule Take 1 capsule (25 mg total) by mouth 2 (two) times daily.    promethazine-codeine 6.25-10 mg/5 ml (PHENERGAN WITH CODEINE) 6.25-10 mg/5 mL syrup Take 5 mLs by mouth every 4 - 6 hours as needed.     Family History       Problem Relation (Age of Onset)    Cancer Mother    Cirrhosis Brother    Diabetes Brother    Heart attack Father    Heart disease Father, Sister, Brother, Sister, Brother    Hypertension Father, Brother          Tobacco Use    Smoking status: Never    Smokeless tobacco: Never   Substance and Sexual Activity    Alcohol use: Yes     Comment: up to 1 yr ago drank 2-3 drinks on occasion but sporadic    Drug use: No    Sexual activity: Yes     Partners: Male     Review of  Systems   Constitutional:  Positive for fatigue. Negative for appetite change and fever.   HENT:  Negative for congestion, ear pain, postnasal drip, rhinorrhea and sinus pressure.    Eyes:  Negative for photophobia, pain and visual disturbance.   Respiratory:  Positive for cough and shortness of breath. Negative for wheezing.    Cardiovascular:  Negative for chest pain, palpitations and leg swelling.   Gastrointestinal:  Negative for abdominal pain, constipation and vomiting.   Endocrine: Negative for cold intolerance and heat intolerance.   Genitourinary:  Negative for dysuria, flank pain, frequency and hematuria.   Musculoskeletal:  Negative for arthralgias and myalgias.   Skin:  Negative for pallor and rash.   Allergic/Immunologic: Negative for immunocompromised state.   Neurological:  Positive for weakness and headaches. Negative for dizziness, syncope, light-headedness and numbness.   Hematological:  Does not bruise/bleed easily.   Psychiatric/Behavioral:  Negative for agitation, confusion and dysphoric mood. The patient is not nervous/anxious.    Objective:     Vital Signs (Most Recent):  Temp: 96.6 °F (35.9 °C) (02/21/23 0735)  Pulse: 86 (02/21/23 0800)  Resp: 12 (02/21/23 0735)  BP: 119/71 (02/21/23 0840)  SpO2: 95 % (02/21/23 0800) Vital Signs (24h Range):  Temp:  [96.6 °F (35.9 °C)-98.5 °F (36.9 °C)] 96.6 °F (35.9 °C)  Pulse:  [75-95] 86  Resp:  [12-22] 12  SpO2:  [85 %-99 %] 95 %  BP: (119-144)/() 119/71     Weight: 93.8 kg (206 lb 12.7 oz)  Body mass index is 33.38 kg/m².    Physical Exam  Constitutional:       Appearance: She is obese. She is not ill-appearing.   Cardiovascular:      Rate and Rhythm: Normal rate and regular rhythm.      Pulses: Normal pulses.      Comments: Elevated JVP up to mid-neck on exam on an upright position with positive Hepatojugular reflex  Pulmonary:      Effort: Pulmonary effort is normal.      Breath sounds: Normal breath sounds.   Abdominal:      General: Bowel  sounds are normal.      Palpations: Abdomen is soft.      Tenderness: There is no abdominal tenderness.   Musculoskeletal:      Right lower leg: No edema.      Left lower leg: No edema.   Skin:     General: Skin is warm.   Neurological:      Mental Status: She is alert.           Significant Labs: All pertinent labs within the past 24 hours have been reviewed.  ABGs: No results for input(s): PH, PCO2, HCO3, POCSATURATED, BE, TOTALHB, COHB, METHB, O2HB, POCFIO2, PO2 in the last 48 hours.  CBC:   Recent Labs   Lab 02/20/23 2026 02/21/23 0204   WBC 5.12 4.48   HGB 8.5* 8.0*   HCT 28.4* 27.4*    172     CMP:   Recent Labs   Lab 02/20/23 2026 02/21/23 0204    141   K 4.3 4.2    109   CO2 21* 22*   * 86   BUN 36* 35*   CREATININE 2.1* 2.1*   CALCIUM 9.3 8.9   PROT 6.7 6.3   ALBUMIN 3.7 3.6   BILITOT 1.5* 1.6*   ALKPHOS 57 54*   AST 22 23   ALT 36 35   ANIONGAP 12 10     Coagulation: No results for input(s): PT, INR, APTT in the last 48 hours.  Lactic Acid: No results for input(s): LACTATE in the last 48 hours.  Troponin:   Recent Labs   Lab 02/20/23 2026 02/21/23 0204 02/21/23  0804   TROPONINI 0.646* 0.629* 0.645*   Normal sinus rhythm Possible Left atrial enlargement Left anterior fascicular block LVH with repolarization abnormality Abnormal ECG When compared with ECG of 30-JAN-2023 08:52, No significant change was found 25mm/s 10mm/mV 150Hz 9.0.7 12SL 241 HD JACIEL: 997 Referred by: NAINREFLUCÍA COATS  Significant Imaging: I have reviewed all pertinent imaging results/findings within the past 24 hours.  CXR: I have reviewed all pertinent results/findings within the past 24 hours and my personal findings are:       Stable cardiomegaly with mild pulmonary vascular congestion.  Overall findings suggestive of mild pulmonary edema secondary to CHF.

## 2023-02-21 NOTE — PROGRESS NOTES
"   02/21/23 0110   Vital Signs   Pulse 84   Heart Rate Source Monitor   Resp 16   SpO2 96 %   /80   MAP (mmHg) 102   BP Location Right arm   Patient Position Lying     Notified by monitor tech that pt had 16 beat run of v. Tach. Upon arrival to pt's room, pt asleep. Pt awaken and questioned about how she was feeling. Pt states she was feeling ok. Pt was advised she had a run of v. Tach, and pt stated, "this will happen all night." Pt asymptomatic at this time and reminds nurse that she does have an AICD.   0118 called and notified Dr. Fonseca about pt's run of v.tach. MD stated to order mag and phos for am lab draw and to monitor pt.   "

## 2023-02-22 VITALS
HEART RATE: 80 BPM | DIASTOLIC BLOOD PRESSURE: 82 MMHG | TEMPERATURE: 97 F | RESPIRATION RATE: 20 BRPM | SYSTOLIC BLOOD PRESSURE: 126 MMHG | OXYGEN SATURATION: 98 % | BODY MASS INDEX: 33.24 KG/M2 | HEIGHT: 66 IN | WEIGHT: 206.81 LBS

## 2023-02-22 PROBLEM — I70.0 AORTIC ATHEROSCLEROSIS: Status: ACTIVE | Noted: 2023-02-22

## 2023-02-22 PROBLEM — E66.09 CLASS 1 OBESITY DUE TO EXCESS CALORIES WITHOUT SERIOUS COMORBIDITY IN ADULT: Status: ACTIVE | Noted: 2023-02-22

## 2023-02-22 PROBLEM — I27.20 PULMONARY HYPERTENSION: Status: ACTIVE | Noted: 2023-02-22

## 2023-02-22 PROBLEM — E66.811 CLASS 1 OBESITY DUE TO EXCESS CALORIES WITHOUT SERIOUS COMORBIDITY IN ADULT: Status: ACTIVE | Noted: 2023-02-22

## 2023-02-22 LAB
ANION GAP SERPL CALC-SCNC: 11 MMOL/L (ref 8–16)
BUN SERPL-MCNC: 46 MG/DL (ref 6–20)
CALCIUM SERPL-MCNC: 8.8 MG/DL (ref 8.7–10.5)
CHLORIDE SERPL-SCNC: 105 MMOL/L (ref 95–110)
CO2 SERPL-SCNC: 24 MMOL/L (ref 23–29)
CREAT SERPL-MCNC: 2.3 MG/DL (ref 0.5–1.4)
EST. GFR  (NO RACE VARIABLE): 24 ML/MIN/1.73 M^2
GLUCOSE SERPL-MCNC: 67 MG/DL (ref 70–110)
POCT GLUCOSE: 133 MG/DL (ref 70–110)
POCT GLUCOSE: 85 MG/DL (ref 70–110)
POTASSIUM SERPL-SCNC: 4 MMOL/L (ref 3.5–5.1)
SODIUM SERPL-SCNC: 140 MMOL/L (ref 136–145)

## 2023-02-22 PROCEDURE — 99223 1ST HOSP IP/OBS HIGH 75: CPT | Mod: NTX,,, | Performed by: INTERNAL MEDICINE

## 2023-02-22 PROCEDURE — 36415 COLL VENOUS BLD VENIPUNCTURE: CPT | Mod: NTX | Performed by: INTERNAL MEDICINE

## 2023-02-22 PROCEDURE — 94640 AIRWAY INHALATION TREATMENT: CPT | Mod: NTX

## 2023-02-22 PROCEDURE — 25000003 PHARM REV CODE 250: Mod: NTX | Performed by: INTERNAL MEDICINE

## 2023-02-22 PROCEDURE — 27000221 HC OXYGEN, UP TO 24 HOURS: Mod: NTX

## 2023-02-22 PROCEDURE — 25000242 PHARM REV CODE 250 ALT 637 W/ HCPCS: Mod: NTX | Performed by: INTERNAL MEDICINE

## 2023-02-22 PROCEDURE — 99223 PR INITIAL HOSPITAL CARE,LEVL III: ICD-10-PCS | Mod: NTX,,, | Performed by: INTERNAL MEDICINE

## 2023-02-22 PROCEDURE — 99238 HOSP IP/OBS DSCHRG MGMT 30/<: CPT | Mod: NTX,,, | Performed by: FAMILY MEDICINE

## 2023-02-22 PROCEDURE — 25000003 PHARM REV CODE 250: Mod: NTX | Performed by: STUDENT IN AN ORGANIZED HEALTH CARE EDUCATION/TRAINING PROGRAM

## 2023-02-22 PROCEDURE — 94760 N-INVAS EAR/PLS OXIMETRY 1: CPT | Mod: NTX

## 2023-02-22 PROCEDURE — 80048 BASIC METABOLIC PNL TOTAL CA: CPT | Mod: NTX | Performed by: INTERNAL MEDICINE

## 2023-02-22 PROCEDURE — 99238 PR HOSPITAL DISCHARGE DAY,<30 MIN: ICD-10-PCS | Mod: NTX,,, | Performed by: FAMILY MEDICINE

## 2023-02-22 PROCEDURE — 63600175 PHARM REV CODE 636 W HCPCS: Mod: NTX | Performed by: NURSE PRACTITIONER

## 2023-02-22 RX ORDER — SACUBITRIL AND VALSARTAN 49; 51 MG/1; MG/1
1 TABLET, FILM COATED ORAL 2 TIMES DAILY
Qty: 60 TABLET | Refills: 0 | Status: SHIPPED | OUTPATIENT
Start: 2023-02-22 | End: 2023-02-22 | Stop reason: SDUPTHER

## 2023-02-22 RX ORDER — SACUBITRIL AND VALSARTAN 49; 51 MG/1; MG/1
1 TABLET, FILM COATED ORAL 2 TIMES DAILY
Qty: 60 TABLET | Refills: 0 | Status: SHIPPED | OUTPATIENT
Start: 2023-02-22 | End: 2023-03-21

## 2023-02-22 RX ORDER — FUROSEMIDE 10 MG/ML
80 INJECTION INTRAMUSCULAR; INTRAVENOUS ONCE
Status: COMPLETED | OUTPATIENT
Start: 2023-02-22 | End: 2023-02-22

## 2023-02-22 RX ORDER — METOPROLOL SUCCINATE 50 MG/1
50 TABLET, EXTENDED RELEASE ORAL 2 TIMES DAILY
Status: DISCONTINUED | OUTPATIENT
Start: 2023-02-22 | End: 2023-02-22 | Stop reason: HOSPADM

## 2023-02-22 RX ADMIN — SACUBITRIL AND VALSARTAN 2 TABLET: 49; 51 TABLET, FILM COATED ORAL at 09:02

## 2023-02-22 RX ADMIN — FUROSEMIDE 80 MG: 10 INJECTION, SOLUTION INTRAMUSCULAR; INTRAVENOUS at 09:02

## 2023-02-22 RX ADMIN — PREGABALIN 25 MG: 25 CAPSULE ORAL at 09:02

## 2023-02-22 RX ADMIN — ISOSORBIDE DINITRATE 30 MG: 10 TABLET ORAL at 09:02

## 2023-02-22 RX ADMIN — GUAIFENESIN 600 MG: 600 TABLET, EXTENDED RELEASE ORAL at 09:02

## 2023-02-22 RX ADMIN — METOPROLOL SUCCINATE 50 MG: 50 TABLET, EXTENDED RELEASE ORAL at 09:02

## 2023-02-22 RX ADMIN — APIXABAN 5 MG: 5 TABLET, FILM COATED ORAL at 09:02

## 2023-02-22 RX ADMIN — IPRATROPIUM BROMIDE AND ALBUTEROL SULFATE 3 ML: 2.5; .5 SOLUTION RESPIRATORY (INHALATION) at 07:02

## 2023-02-22 NOTE — ASSESSMENT & PLAN NOTE
Documented on multiple right heart caths and Doppler echo studies.  Diuresis to continue.  She is [place] [employer] functional capacity 3 chronically.

## 2023-02-22 NOTE — ASSESSMENT & PLAN NOTE
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  No telemetric abnormalities noted.

## 2023-02-22 NOTE — PROGRESS NOTES
[address] - [facility] Surg ([facility])  [facility] Medicine  Progress Note    Patient Name: Hafsa Hawley  MRN: [id number]  Patient Class: IP- Inpatient   Admission Date: 2/20/2023  Length of Stay: 2 days  Attending Physician: Ines Fonseca MD  Primary Care Provider: Cristobal Ann MD        Subjective:     Principal Problem:Chronic combined systolic and diastolic heart failure        HPI:  HPI:     Hafsa Hawley is a 57 y.o. female   with a PMHx of NICM HFrEF (EF 10%) s/p AICD placement, Paroxysmal A Fib on Eliquis, Pulmonary Hypertension, Hypertension, Type 2 Diabetes Mellitus presenting with shortness of breath. She states that her dyspnea started a few days ago which she has been attempting to manage at home, but has had progressive worsening over the course of few days .  Her lasix was recently changed to bumex . She diuresed well with IV lasix             Overview/Hospital Course:  2/22.  Patient SOB improving and abdominal swelling improving.  Patient diuresed 3liters since admission.  Plan for discharge after dose of IV lasix 80mg today and take bumex tonight.  Will decrease Jardiance from 25mg to 10mg qd.  Will decrease entresto to half dose due to bump in creatinine.        Past Medical History:   Diagnosis Date    Anemia     Arthritis     Atrial fibrillation     OAC    Chronic respiratory failure with hypoxia, on home oxygen therapy     2L with activity, off at rest.  Per Pulm  no overt evidence of ILD or COPD on PFTs and CT to explain O2 needs.    CKD (chronic kidney disease), stage IV 05/08/2018    Congestive heart failure     s/p AICD placement,    Deep vein thrombosis     Depression     elevated bilirubin d/t Gilbert's syndrome     confirmed by Ada genetic testing, evaluated by hepatology    Encounter for blood transfusion     GERD (gastroesophageal reflux disease)     Hypertension     Pheochromocytoma, malignant     Right kidney mass     Sleep apnea     Thalassemia trait, alpha     Thyroid  disease     Type 2 diabetes mellitus with hyperglycemia, without long-term current use of insulin 08/13/2020       Past Surgical History:   Procedure Laterality Date    APPENDECTOMY      BONE MARROW BIOPSY      CARDIAC DEFIBRILLATOR PLACEMENT Left 12/2016    CARDIAC ELECTROPHYSIOLOGY MAPPING AND ABLATION      CARDIAC ELECTROPHYSIOLOGY MAPPING AND ABLATION      COLONOSCOPY N/A 5/6/2022    Procedure: COLONOSCOPY;  Surgeon: Arely Betancourt MD;  Location: [facility] (2ND FLR);  Service: Endoscopy;  Laterality: N/A;  heart transplant candidate/ EF 25% - 2nd floor/ defib - Biotron[facility] - ER[facility]  Eliquis - per Dr. Cortez with [facility], Pt ok to hold Eliquis x 2 days prior-see media tab-outside correspondence dated 12/30/21  - [facility]  verbal instructions/portal instructions/email instructions - s    EYE SURGERY      due to running tears    FRACTURE SURGERY Left     hand 5th digit    HYSTERECTOMY      KNEE SURGERY Left 2016    hematoma    LIVER BIOPSY  10/24/2018    Minimal steatosis, predominantly macrovesicular, 1%, Minimal nonspecific portal inflammation, no fibrosis. No findings on biopsy to explain elevated bilirubin levels. Could be d/t Gilbert's =?- hemolysis    RIGHT HEART CATHETERIZATION Right 12/07/2021    Procedure: INSERTION, CATHETER, RIGHT HEART;  Surgeon: Irving Cardenas MD;  Location: [facility] CATH LAB;  Service: Cardiology;  Laterality: Right;    RIGHT HEART CATHETERIZATION Right 12/19/2022    Procedure: INSERTION, CATHETER, RIGHT HEART;  Surgeon: Burke Camilo MD;  Location: [facility] CATH LAB;  Service: Cardiology;  Laterality: Right;    TRANSJUGULAR BIOPSY OF LIVER N/A 10/24/2018    Procedure: BIOPSY, LIVER, TRANSJUGULAR APPROACH;  [facility]: [facility];  [facility];  Service: Radiology;  Laterality: N/A;       Review of patient's allergies indicates:   Allergen Reactions    Penicillins Hives and Other (See Comments)    Iodinated contrast media Nausea And Vomiting     "Oxycodone-acetaminophen Other (See Comments)     Nausea, Dizziness, Anxiety.  "I don't like how it makes me feel."   Given Hydromorphone 0.5mg IVP  Without problems.  Other reaction(s): Other (See Comments)    Clonazepam Other (See Comments)    Diovan hct [valsartan-hydrochlorothiazide] Other (See Comments)     Causes coughing    Iodine Other (See Comments)    Irinotecan      Pt has homozygosity for the TA7 promoter variant that places this individual at significantly increased risk for   severe neutropenia (grade 4) when treated with the standard dose of irinotecan (risk approximately 50%).   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 Metabolism of other drugs not listed here may also be impacted by UGT1A1 enzyme activity.       Tramadol Nausea And Vomiting and Other (See Comments)     Other reaction(s): Other (See Comments)    Valsartan Other (See Comments)       Current Facility-Administered Medications on File Prior to Encounter   Medication    0.9%  NaCl infusion    vancomycin in dextrose 5 % 1 gram/250 mL IVPB 1,000 mg     Current Outpatient Medications on File Prior to Encounter   Medication Sig    albuterol-ipratropium (DUO-NEB) 2.5 mg-0.5 mg/3 mL nebulizer solution Take 3 mLs by nebulization 2 (two) times a day.    allopurinoL (ZYLOPRIM) 100 MG tablet Take 1 tablet (100 mg total) by mouth daily as needed (gout attack).    ALPRAZolam (XANAX) 2 MG Tab Take 0.25-2 mg by mouth 2 (two) times daily as needed (anxiety).    apixaban (ELIQUIS) 5 mg Tab Take 1 tablet (5 mg total) by mouth 2 (two) times daily.    atorvastatin (LIPITOR) 40 MG tablet Take 1 tablet (40 mg total) by mouth every evening.    b complex vitamins tablet Take 1 tablet by mouth once daily.    bumetanide (BUMEX) 2 MG tablet Take 2 tablets (4 mg total) by mouth 2 (two) times a day.    diclofenac sodium " (VOLTAREN) 1 % Gel APPLY 2 G TOPICALLY 3 (THREE) TIMES DAILY AS NEEDED (PAIN).    ferrous sulfate 325 (65 FE) MG EC tablet Take 1 tablet (325 mg total) by mouth once daily.    fluticasone propionate (FLONASE) 50 mcg/actuation nasal spray 2 sprays by Each Nostril route daily as needed for Rhinitis.     glimepiride (AMARYL) 2 MG tablet Take 0.5 tablets (1 mg total) by mouth once daily.    isosorbide dinitrate (ISORDIL) 30 MG Tab Take 1 tablet (30 mg total) by mouth 3 (three) times daily.    LIDOcaine (LIDODERM) 5 % Place 1 patch onto the skin daily as needed (pain). Remove & Discard patch within 12 hours or as directed by MD    metoprolol succinate (TOPROL-XL) 50 MG 24 hr tablet Take 1 tablet (50 mg total) by mouth once daily.    mometasone-formoterol (DULERA) 200-5 mcg/actuation inhaler Inhale 2 puffs into the lungs 2 (two) times daily. Controller    NITROSTAT 0.4 mg SL tablet Take 2.5 tablets (1 mg total) by mouth every 5 (five) minutes as needed for Chest pain. No more than 3 tablets in 15 minutes.    ondansetron (ZOFRAN-ODT) 4 MG TbDL Take 1 tablet (4 mg total) by mouth every 12 (twelve) hours as needed (nausea).    pantoprazole (PROTONIX) 40 MG tablet TAKE 1 TABLET BY MOUTH DAILY IN THE MORNING    potassium chloride (MICRO-K) 10 MEQ CpSR Take 1 capsule (10 mEq total) by mouth once daily.    rOPINIRole (REQUIP) 4 MG tablet Take 1 tablet (4 mg total) by mouth once daily. Pt taking 4mg daily    scopolamine (TRANSDERM-SCOP) 1.3-1.5 mg (1 mg over 3 days) Place 1 patch onto the skin every 72 hours as needed (nausea).    tiotropium bromide (SPIRIVA RESPIMAT) 1.25 mcg/actuation inhaler Inhale 2 puffs into the lungs once daily. Controller    VENTOLIN HFA 90 mcg/actuation inhaler Inhale 2 puffs into the lungs every 6 (six) hours as needed for Shortness of Breath or Wheezing.    [DISCONTINUED] empagliflozin (JARDIANCE) 25 mg tablet Take 25 mg by mouth once daily.    [DISCONTINUED] sacubitriL-valsartan  (ENTRESTO)  mg per tablet Take 1 tablet by mouth 2 (two) times daily.    blood sugar diagnostic (ACCU-CHEK GUIDE TEST STRIPS) Strp 1 strip by Other route 3 (three) times daily.    cetirizine (ZYRTEC) 10 MG tablet Take 10 mg by mouth daily as needed for Allergies.    ergocalciferol (ERGOCALCIFEROL) 50,000 unit Cap Take 1 capsule (50,000 Units total) by mouth every 7 days. (Patient taking differently: Take 50,000 Units by mouth every Saturday.)    lancets (ACCU-CHEK SOFTCLIX LANCETS) Misc 1 Device by Misc.(Non-Drug; Combo Route) route 3 (three) times daily.    pregabalin (LYRICA) 25 MG capsule Take 1 capsule (25 mg total) by mouth 2 (two) times daily.    [DISCONTINUED] promethazine-codeine 6.25-10 mg/5 ml (PHENERGAN WITH CODEINE) 6.25-10 mg/5 mL syrup Take 5 mLs by mouth every 4 - 6 hours as needed.     Family History       Problem Relation (Age of Onset)    Cancer Mother    Cirrhosis Brother    Diabetes Brother    Heart attack Father    Heart disease Father, Sister, Brother, Sister, Brother    Hypertension Father, Brother          Tobacco Use    Smoking status: Never    Smokeless tobacco: Never   Substance and Sexual Activity    Alcohol use: Yes     Comment: up to 1 yr ago drank 2-3 drinks on occasion but sporadic    Drug use: No    Sexual activity: Yes     Partners: Male     Review of Systems   Constitutional:  Positive for fatigue. Negative for appetite change and fever.   HENT:  Negative for congestion, ear pain, postnasal drip, rhinorrhea and sinus pressure.    Eyes:  Negative for photophobia, pain and visual disturbance.   Respiratory:  Positive for cough. Negative for shortness of breath and wheezing.    Cardiovascular:  Negative for chest pain, palpitations and leg swelling.   Gastrointestinal:  Negative for abdominal pain, constipation and vomiting.   Endocrine: Negative for cold intolerance and heat intolerance.   Genitourinary:  Negative for dysuria, flank pain, frequency and hematuria.    Musculoskeletal:  Negative for arthralgias and myalgias.   Skin:  Negative for pallor and rash.   Allergic/Immunologic: Negative for immunocompromised state.   Neurological:  Positive for headaches. Negative for dizziness, syncope, weakness, light-headedness and numbness.   Hematological:  Does not bruise/bleed easily.   Psychiatric/Behavioral:  Negative for agitation, confusion and dysphoric mood. The patient is not nervous/anxious.    Objective:     Vital Signs (Most Recent):  Temp: 98 °F (36.7 °C) (02/22/23 0928)  Pulse: 86 (02/22/23 0959)  Resp: 16 (02/22/23 0732)  BP: 135/74 (02/22/23 0928)  SpO2: 95 % (02/22/23 0928) Vital Signs (24h Range):  Temp:  [97.9 °F (36.6 °C)-98.6 °F (37 °C)] 98 °F (36.7 °C)  Pulse:  [70-87] 86  Resp:  [16-90] 16  SpO2:  [94 %-98 %] 95 %  BP: (114-137)/(64-86) 135/74     Weight: 93.8 kg (206 lb 12.7 oz)  Body mass index is 33.38 kg/m².    Physical Exam  Constitutional:       Appearance: She is obese. She is not ill-appearing.   Cardiovascular:      Rate and Rhythm: Normal rate and regular rhythm.      Pulses: Normal pulses.   Pulmonary:      Effort: Pulmonary effort is normal.      Breath sounds: Normal breath sounds. No wheezing.   Abdominal:      General: Bowel sounds are normal.      Palpations: Abdomen is soft.      Tenderness: There is no abdominal tenderness.   Musculoskeletal:      Right lower leg: No edema.      Left lower leg: No edema.   Skin:     General: Skin is warm.   Neurological:      Mental Status: She is alert.           Significant Labs: All pertinent labs within the past 24 hours have been reviewed.  ABGs: No results for input(s): PH, PCO2, HCO3, POCSATURATED, BE, TOTALHB, COHB, METHB, O2HB, POCFIO2, PO2 in the last 48 hours.  CBC:   Recent Labs   Lab 02/20/23 2026 02/21/23  0204   WBC 5.12 4.48   HGB 8.5* 8.0*   HCT 28.4* 27.4*    172       CMP:   Recent Labs   Lab 02/20/23 2026 02/21/23  0204 02/22/23  0603    141 140   K 4.3 4.2 4.0    109  105   CO2 21* 22* 24   * 86 67*   BUN 36* 35* 46*   CREATININE 2.1* 2.1* 2.3*   CALCIUM 9.3 8.9 8.8   PROT 6.7 6.3  --    ALBUMIN 3.7 3.6  --    BILITOT 1.5* 1.6*  --    ALKPHOS 57 54*  --    AST 22 23  --    ALT 36 35  --    ANIONGAP 12 10 11       Coagulation: No results for input(s): PT, INR, APTT in the last 48 hours.  Lactic Acid: No results for input(s): LACTATE in the last 48 hours.  Troponin:   Recent Labs   Lab 02/20/23 2026 02/21/23  0204 02/21/23  0804   TROPONINI 0.646* 0.629* 0.645*     Normal sinus rhythm Possible Left atrial enlargement Left anterior fascicular block LVH with repolarization abnormality Abnormal ECG When compared with ECG of 30-JAN-2023 08:52, No significant change was found 25mm/s 10mm/mV 150Hz 9.0.7 12SL 241 HD JACIEL: 997 Referred by: JOSE MARIA COATS  Significant Imaging: I have reviewed all pertinent imaging results/findings within the past 24 hours.  CXR: I have reviewed all pertinent results/findings within the past 24 hours and my personal findings are:       Stable cardiomegaly with mild pulmonary vascular congestion.  Overall findings suggestive of mild pulmonary edema secondary to CHF.      Assessment/Plan:      * Acute on chronic combined systolic and diastolic heart failure  Supplemental O2 via nasal canula; titrate O2 saturation to >92%.  Serial Cardiac enzymes × 3.  Diuretic administration. Monitor electrolytes for hypokalemia and hypomagnesemia.  Cardiology Consultation.  2-D Echocardiogram for evaluation of left ventricle systolic function : EF 10 %  Daily weights.  Strict I/Os.  Fluid restriction.  Na restriction <2g/d.  .  2/22  Patient diuresed 3liters since admission.  Plan for discharge after dose of IV lasix 80mg today and take bumex tonight.  Will decrease Jardiance from 25mg to 10mg qd.  Will decrease entresto to half dose due to bump in creatinine.          Type 2 diabetes mellitus without complication, without long-term current use of insulin  Patient's  FSGs are controlled on current medication regimen.  Last A1c reviewed-   Lab Results   Component Value Date    HGBA1C 4.6 01/30/2023     Most recent fingerstick glucose reviewed-   Recent Labs   Lab 02/21/23  1701 02/21/23  2121 02/22/23  0708 02/22/23  1104   POCTGLUCOSE 76 81 85 133*     Current correctional scale  Low  Maintain anti-hyperglycemic dose as follows-   Antihyperglycemics (From admission, onward)    None        Hold Oral hypoglycemics while patient is in the hospital.    elevated bilirubin d/t Gilbert's syndrome  CMP  Sodium   Date Value Ref Range Status   02/21/2023 141 136 - 145 mmol/L Final     Potassium   Date Value Ref Range Status   02/21/2023 4.2 3.5 - 5.1 mmol/L Final     Chloride   Date Value Ref Range Status   02/21/2023 109 95 - 110 mmol/L Final     CO2   Date Value Ref Range Status   02/21/2023 22 (L) 23 - 29 mmol/L Final     Glucose   Date Value Ref Range Status   02/21/2023 86 70 - 110 mg/dL Final     BUN   Date Value Ref Range Status   02/21/2023 35 (H) 6 - 20 mg/dL Final     Creatinine   Date Value Ref Range Status   02/21/2023 2.1 (H) 0.5 - 1.4 mg/dL Final     Calcium   Date Value Ref Range Status   02/21/2023 8.9 8.7 - 10.5 mg/dL Final     Total Protein   Date Value Ref Range Status   02/21/2023 6.3 6.0 - 8.4 g/dL Final     Albumin   Date Value Ref Range Status   02/21/2023 3.6 3.5 - 5.2 g/dL Final     Total Bilirubin   Date Value Ref Range Status   02/21/2023 1.6 (H) 0.1 - 1.0 mg/dL Final     Comment:     For infants and newborns, interpretation of results should be based  on gestational age, weight and in agreement with clinical  observations.    Premature Infant recommended reference ranges:  Up to 24 hours.............<8.0 mg/dL  Up to 48 hours............<12.0 mg/dL  3-5 days..................<15.0 mg/dL  6-29 days.................<15.0 mg/dL       Alkaline Phosphatase   Date Value Ref Range Status   02/21/2023 54 (L) 55 - 135 U/L Final     AST   Date Value Ref Range Status    02/21/2023 23 10 - 40 U/L Final     ALT   Date Value Ref Range Status   02/21/2023 35 10 - 44 U/L Final     Anion Gap   Date Value Ref Range Status   02/21/2023 10 8 - 16 mmol/L Final     eGFR   Date Value Ref Range Status   02/21/2023 27 (A) >60 mL/min/1.73 m^2 Final           Paroxysmal supraventricular tachycardia        ICD (implantable cardioverter-defibrillator) in place        Depression due to physical illness  Very anxious   Resume alprazolam      CKD (chronic kidney disease), stage IV    Will follow labs       Paroxysmal atrial fibrillation  Patient with Paroxysmal (<7 days) atrial fibrillation which is controlled currently with Beta Blocker. Patient is currently in sinus rhythm.Anticoagulation indicated. Anticoagulation done with eliquis.        MELISSA (obstructive sleep apnea)        Essential hypertension    Resume enteresto and metoprolol    Elevated troponin          VTE Risk Mitigation (From admission, onward)         Ordered     apixaban tablet 5 mg  2 times daily         02/20/23 2316     IP VTE HIGH RISK PATIENT  Once         02/20/23 2338     Place sequential compression device  Until discontinued         02/20/23 2338     Place LILIANA hose  Until discontinued         02/20/23 2338                Discharge Planning   CHIQUI: 2/22/2023     Code Status: Full Code   Is the patient medically ready for discharge?:     Reason for patient still in hospital (select all that apply): Treatment  Discharge Plan A: Home with family                  Colt Evans MD  Department of Hospital Medicine   [place] - [facility] Surg (3rd Fl)

## 2023-02-22 NOTE — NURSING
Reviewed discharge instructions with patient and S/O. Reviewed RX needed to p/u at [facility]. Time allowed for patient to ask questions about plan of care for home. Patient asked about lab work drawn here if it was something that she needed as outpatient. Lab work given of BNP and CMP per MD. Patient denies needing any further information. Voiced understanding. In agreement with plan of care for home.

## 2023-02-22 NOTE — PLAN OF CARE
[place] - Med Surg (3rd Fl)  Initial Discharge Assessment       Primary Care Provider: Cristobal Ann MD    Admission Diagnosis: Shortness of breath [R06.02]    Admission Date: 2/20/2023  Expected Discharge Date: 2/22/2023    Discharge Barriers Identified: None    Payor: MEDICAID / Plan: [employer] [facility] / Product Type: Managed Medicaid /     Extended Emergency Contact Information  Primary Emergency Contact: BautistaJaye  Address: [address] [address] [facility]  Home Phone: [phone]  Relation: Sister  Secondary Emergency Contact: Jeanie Jaimes  Mobile Phone: [phone]  Relation: Sister    Discharge Plan A: Home with family  Discharge Plan B: Home with family      [id number] [place] - [address]  [address]  [place] [address]  Phone: [phone] Fax: [phone]    [facility] [address] [place] [address]  Phone: [phone] Fax: [phone]      Initial Assessment (most recent)       Adult Discharge Assessment - 02/22/23 1139          Discharge Assessment    Assessment Type Discharge Planning Assessment     Confirmed/corrected address, phone number and insurance Yes     Confirmed Demographics Correct on Facesheet     Source of Information patient     Communicated CHIQUI with patient/caregiver Date not available/Unable to determine     Reason For Admission CHF     People in Home spouse;grandchild(ricky)     Facility Arrived From: Home     Do you expect to return to your current living situation? Yes     Do you have help at home or someone to help you manage your care at home? Yes     Who are your caregiver(s) and their phone number(s)? Jaye Baum (Sister) [phone]     Prior to hospitilization cognitive status: Alert/Oriented     Current cognitive status: Alert/Oriented     Equipment Currently Used at Home CPAP;oxygen;nebulizer;walker, rolling;shower chair;glucometer     Readmission within 30 days? Yes     Do you  currently have service(s) that help you manage your care at home? No     Do you take prescription medications? Yes     Do you have prescription coverage? Yes     Coverage [employer]     Do you have any problems affording any of your prescribed medications? No     How do you get to doctors appointments? car, drives self;family or friend will provide     Are you on dialysis? No     Discharge Plan A Home with family     Discharge Plan B Home with family     DME Needed Upon Discharge  none     Discharge Plan discussed with: Patient     Discharge Barriers Identified None                        Discharge assessment completed with patient. Denies any post-acute care needs at this time. SW to remain available.

## 2023-02-22 NOTE — ASSESSMENT & PLAN NOTE
I have increased her Toprol dosing to 50 mg 2 times daily.  Entresto to continue.  Her blood pressure was elevated with diastolic readings 100 mmHg range on admission.  Her readings are better at this time post diuresis.  Close follow-up planned.

## 2023-02-22 NOTE — ASSESSMENT & PLAN NOTE
Most recent echo November 2022 with ejection fraction estimated to be 20%.  She has half wrapped dating back to 2017.  It is a nonischemic cardiomyopathy by diagnosis.

## 2023-02-22 NOTE — ASSESSMENT & PLAN NOTE
CMP  Sodium   Date Value Ref Range Status   02/22/2023 140 136 - 145 mmol/L Final     Potassium   Date Value Ref Range Status   02/22/2023 4.0 3.5 - 5.1 mmol/L Final     Chloride   Date Value Ref Range Status   02/22/2023 105 95 - 110 mmol/L Final     CO2   Date Value Ref Range Status   02/22/2023 24 23 - 29 mmol/L Final     Glucose   Date Value Ref Range Status   02/22/2023 67 (L) 70 - 110 mg/dL Final     BUN   Date Value Ref Range Status   02/22/2023 46 (H) 6 - 20 mg/dL Final     Creatinine   Date Value Ref Range Status   02/22/2023 2.3 (H) 0.5 - 1.4 mg/dL Final     Calcium   Date Value Ref Range Status   02/22/2023 8.8 8.7 - 10.5 mg/dL Final     Total Protein   Date Value Ref Range Status   02/21/2023 6.3 6.0 - 8.4 g/dL Final     Albumin   Date Value Ref Range Status   02/21/2023 3.6 3.5 - 5.2 g/dL Final     Total Bilirubin   Date Value Ref Range Status   02/21/2023 1.6 (H) 0.1 - 1.0 mg/dL Final     Comment:     For infants and newborns, interpretation of results should be based  on gestational age, weight and in agreement with clinical  observations.    Premature Infant recommended reference ranges:  Up to 24 hours.............<8.0 mg/dL  Up to 48 hours............<12.0 mg/dL  3-5 days..................<15.0 mg/dL  6-29 days.................<15.0 mg/dL       Alkaline Phosphatase   Date Value Ref Range Status   02/21/2023 54 (L) 55 - 135 U/L Final     AST   Date Value Ref Range Status   02/21/2023 23 10 - 40 U/L Final     ALT   Date Value Ref Range Status   02/21/2023 35 10 - 44 U/L Final     Anion Gap   Date Value Ref Range Status   02/22/2023 11 8 - 16 mmol/L Final     eGFR   Date Value Ref Range Status   02/22/2023 24 (A) >60 mL/min/1.73 m^2 Final

## 2023-02-22 NOTE — ASSESSMENT & PLAN NOTE
Supplemental O2 via nasal canula; titrate O2 saturation to >92%.  Serial Cardiac enzymes × 3 wnl .  Diuretic administration. Monitor electrolytes for hypokalemia and hypomagnesemia.  Cardiology Consultation.  2-D Echocardiogram for evaluation of left ventricle systolic function : EF 10 %  Daily weights.  Strict I/Os.  Fluid restriction.  Na restriction <2g/d.    She is currently on her baseline 2 liters nasal cannula with oxygen saturation 98%.  Patient improving with IV lasix 80mg and diuresed 3 liters since admission. Plan  discharge today. She will take her home bumex dose tonight.  Will decrease Jardiance from 25mg to 10mg qd.  Will decrease entresto to half dose due to bump in creatinine.  She will follow up with cardiology on Friday with bmp.

## 2023-02-22 NOTE — ASSESSMENT & PLAN NOTE
She demonstrates 3-4 beat runs of wide complex tachycardia on telemetry overnight.  Telemetry otherwise negative.

## 2023-02-22 NOTE — ASSESSMENT & PLAN NOTE
Patient's FSGs are controlled on current medication regimen.  Last A1c reviewed-   Lab Results   Component Value Date    HGBA1C 4.6 01/30/2023     Most recent fingerstick glucose reviewed-   Recent Labs   Lab 02/21/23  1701 02/21/23  2121 02/22/23  0708 02/22/23  1104   POCTGLUCOSE 76 81 85 133*     Current correctional scale  Low  Maintain anti-hyperglycemic dose as follows-   Antihyperglycemics (From admission, onward)    None        Hold Oral hypoglycemics while patient is in the hospital.    Discharge home with Jardiance 10mg qd.

## 2023-02-22 NOTE — PLAN OF CARE
Contacted Dr. Rodarte about pt requesting something for cough other than Tessalon pearls. New order received for Mucinex 600 mg BID at this time.

## 2023-02-22 NOTE — HOSPITAL COURSE
2/22.  Patient SOB abdominal swelling improving.  Patient diuresed 3liters since admission.  Plan for discharge after dose of IV lasix 80mg today and take bumex tonight.  Will decrease Jardiance from 25mg to 10mg qd due to GFR less than 30.  Will decrease entresto to half dose due to bump in creatinine.  Plan to follow up with Dr. Duran Friday with repeat labs.

## 2023-02-22 NOTE — PLAN OF CARE
Problem: Adult Inpatient Plan of Care  Goal: Plan of Care Review  Outcome: Ongoing, Progressing  Goal: Patient-Specific Goal (Individualized)  Outcome: Ongoing, Progressing  Goal: Absence of Hospital-Acquired Illness or Injury  Outcome: Ongoing, Progressing  Goal: Optimal Comfort and Wellbeing  Outcome: Ongoing, Progressing  Goal: Readiness for Transition of Care  Outcome: Ongoing, Progressing     Problem: Diabetes Comorbidity  Goal: Blood Glucose Level Within Targeted Range  Outcome: Ongoing, Progressing     Problem: Fall Injury Risk  Goal: Absence of Fall and Fall-Related Injury  Outcome: Ongoing, Progressing     Problem: Adjustment to Illness (Heart Failure)  Goal: Optimal Coping  Outcome: Ongoing, Progressing     Problem: Cardiac Output Decreased (Heart Failure)  Goal: Optimal Cardiac Output  Outcome: Ongoing, Progressing     Problem: Dysrhythmia (Heart Failure)  Goal: Stable Heart Rate and Rhythm  Outcome: Ongoing, Progressing     Problem: Fluid Imbalance (Heart Failure)  Goal: Fluid Balance  Outcome: Ongoing, Progressing     Problem: Functional Ability Impaired (Heart Failure)  Goal: Optimal Functional Ability  Outcome: Ongoing, Progressing     Problem: Oral Intake Inadequate (Heart Failure)  Goal: Optimal Nutrition Intake  Outcome: Ongoing, Progressing     Problem: Respiratory Compromise (Heart Failure)  Goal: Effective Oxygenation and Ventilation  Outcome: Ongoing, Progressing     Problem: Sleep Disordered Breathing (Heart Failure)  Goal: Effective Breathing Pattern During Sleep  Outcome: Ongoing, Progressing

## 2023-02-22 NOTE — CONSULTS
[place] - [facility] ([facility])  Cardiology  Consult Note    Patient Name: Hafsa Hawley  MRN: [id number]  Admission Date: 2/20/2023  Hospital Length of Stay: 2 days  Code Status: Full Code   Attending Provider: Ines Fonseca MD   Consulting Provider: Dom Montero MD  Primary Care Physician: Cristobal Ann MD  Principal Problem:<principal problem not specified>    Patient information was obtained from patient, past medical records, ER records and primary team.     Consults  Subjective:     Chief Complaint:   Shortness of breath, nausea, vomiting.    HPI:   2/22/2023: She presented to the ED with blood pressure 150/100 mm Hg and sinus tachycardia.  She has advanced heart failure with EF 20%.  She has frequent hospitalizations for heart failure.  She has a defibrillator.  It has not fired in the past.  She has paroxysmal AFib and takes Eliquis.  In the ED leg swelling and jugular venous distention are documented.  She has diuresed and is breathing better.  She remains on oxygen.  She had low sats in the 80s on presentation.  Telemetry has shown short runs of ventricular tachycardia overnight.  She has diuresed well.  She has been on furosemide 80 mg 2 times daily.    She recently saw the transplant team.  She was converted to Bumex.  She takes Entresto and Toprol.  She had not been checking her blood pressures at home.  She has sleep apnea but no machine.  She has been compliant with medications including Entresto and Toprol.  She has chest pain at times.  There is documentation of nonischemic cardiomyopathy.  She did have troponins as elevated as 0.6 ng/ml.  Her BNP was greater than 4000 on admission.    Reviewing the transplant consultation evaluation there are issues surrounding persistent pulmonary dysfunction.  There is close follow-up scheduled in the near future.  She has chronic New [place] heart Association functional capacity 3 status.  She states she had been doing well until the day of admission.  She also  had nausea vomiting as well as shortness of breath.      Past Medical History:   Diagnosis Date    Anemia     Arthritis     Atrial fibrillation     OAC    Chronic respiratory failure with hypoxia, on home oxygen therapy     2L with activity, off at rest.  Per Pulm  no overt evidence of ILD or COPD on PFTs and CT to explain O2 needs.    CKD (chronic kidney disease), stage IV 05/08/2018    Congestive heart failure     s/p AICD placement,    Deep vein thrombosis     Depression     elevated bilirubin d/t Gilbert's syndrome     confirmed by [facility] genetic testing, evaluated by hepatology    Encounter for blood transfusion     GERD (gastroesophageal reflux disease)     Hypertension     Pheochromocytoma, malignant     Right kidney mass     Sleep apnea     Thalassemia trait, alpha     Thyroid disease     Type 2 diabetes mellitus with hyperglycemia, without long-term current use of insulin 08/13/2020       Past Surgical History:   Procedure Laterality Date    APPENDECTOMY      BONE MARROW BIOPSY      CARDIAC DEFIBRILLATOR PLACEMENT Left 12/2016    CARDIAC ELECTROPHYSIOLOGY MAPPING AND ABLATION      CARDIAC ELECTROPHYSIOLOGY MAPPING AND ABLATION      COLONOSCOPY N/A 5/6/2022    Procedure: COLONOSCOPY;  Surgeon: Arely Betancourt MD;  Location: [facility] ([address] [place] [address]);  Service: Endoscopy;  Laterality: N/A;  heart transplant candidate/ EF 25% - 2nd floor/ defib - Biotronik - [facility]  Eliquis - per Dr. Cortez with [facility], Pt ok to hold Eliquis x 2 days prior-see media tab-outside correspondence dated 12/30/21  - [facility]  verbal instructions/portal instructions/email instructions - s    EYE SURGERY      due to running tears    FRACTURE SURGERY Left     hand 5th digit    HYSTERECTOMY      KNEE SURGERY Left 2016    hematoma    LIVER BIOPSY  10/24/2018    Minimal steatosis, predominantly macrovesicular, 1%, Minimal nonspecific portal inflammation, no fibrosis. No findings on biopsy to explain elevated bilirubin  "levels. Could be d/t Gilbert's =?- hemolysis    RIGHT HEART CATHETERIZATION Right 12/07/2021    Procedure: INSERTION, CATHETER, RIGHT HEART;  Surgeon: Irving Cardenas MD;  Location: [facility] CATH LAB;  Service: Cardiology;  Laterality: Right;    RIGHT HEART CATHETERIZATION Right 12/19/2022    Procedure: INSERTION, CATHETER, RIGHT HEART;  Surgeon: Burke Camilo MD;  Location: [facility] CATH LAB;  Service: Cardiology;  Laterality: Right;    TRANSJUGULAR BIOPSY OF LIVER N/A 10/24/2018    Procedure: BIOPSY, LIVER, TRANSJUGULAR APPROACH;  Sur[facility]: [facility];  Location: [facility] OR [address] [place] [address];  Service: Radiology;  Laterality: N/A;       Review of patient's allergies indicates:   Allergen Reactions    Penicillins Hives and Other (See Comments)    Iodinated contrast media Nausea And Vomiting    Oxycodone-acetaminophen Other (See Comments)     Nausea, Dizziness, Anxiety.  "I don't like how it makes me feel."   Given Hydromorphone 0.5mg IVP  Without problems.  Other reaction(s): Other (See Comments)    Clonazepam Other (See Comments)    Diovan hct [valsartan-hydrochlorothiazide] Other (See Comments)     Causes coughing    Iodine Other (See Comments)    Irinotecan      Pt has homozygosity for the TA7 promoter variant that places this individual at significantly increased risk for   severe neutropenia (grade 4) when treated with the standard dose of irinotecan (risk approximately 50%).   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 Metabolism of other drugs not listed here may also be impacted by UGT1A1 enzyme activity.       Tramadol Nausea And Vomiting and Other (See Comments)     Other reaction(s): Other (See Comments)    Valsartan Other (See Comments)       Current Facility-Administered Medications on File Prior to Encounter   Medication    0.9%  NaCl infusion    vancomycin in " dextrose 5 % 1 gram/250 mL IVPB 1,000 mg     Current Outpatient Medications on File Prior to Encounter   Medication Sig    albuterol-ipratropium (DUO-NEB) 2.5 mg-0.5 mg/3 mL nebulizer solution Take 3 mLs by nebulization 2 (two) times a day.    allopurinoL (ZYLOPRIM) 100 MG tablet Take 1 tablet (100 mg total) by mouth daily as needed (gout attack).    ALPRAZolam (XANAX) 2 MG Tab Take 0.25-2 mg by mouth 2 (two) times daily as needed (anxiety).    apixaban (ELIQUIS) 5 mg Tab Take 1 tablet (5 mg total) by mouth 2 (two) times daily.    atorvastatin (LIPITOR) 40 MG tablet Take 1 tablet (40 mg total) by mouth every evening.    b complex vitamins tablet Take 1 tablet by mouth once daily.    bumetanide (BUMEX) 2 MG tablet Take 2 tablets (4 mg total) by mouth 2 (two) times a day.    diclofenac sodium (VOLTAREN) 1 % Gel APPLY 2 G TOPICALLY 3 (THREE) TIMES DAILY AS NEEDED (PAIN).    empagliflozin (JARDIANCE) 25 mg tablet Take 25 mg by mouth once daily.    ferrous sulfate 325 (65 FE) MG EC tablet Take 1 tablet (325 mg total) by mouth once daily.    fluticasone propionate (FLONASE) 50 mcg/actuation nasal spray 2 sprays by Each Nostril route daily as needed for Rhinitis.     glimepiride (AMARYL) 2 MG tablet Take 0.5 tablets (1 mg total) by mouth once daily.    isosorbide dinitrate (ISORDIL) 30 MG Tab Take 1 tablet (30 mg total) by mouth 3 (three) times daily.    LIDOcaine (LIDODERM) 5 % Place 1 patch onto the skin daily as needed (pain). Remove & Discard patch within 12 hours or as directed by MD    metoprolol succinate (TOPROL-XL) 50 MG 24 hr tablet Take 1 tablet (50 mg total) by mouth once daily.    mometasone-formoterol (DULERA) 200-5 mcg/actuation inhaler Inhale 2 puffs into the lungs 2 (two) times daily. Controller    NITROSTAT 0.4 mg SL tablet Take 2.5 tablets (1 mg total) by mouth every 5 (five) minutes as needed for Chest pain. No more than 3 tablets in 15 minutes.    ondansetron (ZOFRAN-ODT) 4 MG TbDL  Take 1 tablet (4 mg total) by mouth every 12 (twelve) hours as needed (nausea).    pantoprazole (PROTONIX) 40 MG tablet TAKE 1 TABLET BY MOUTH DAILY IN THE MORNING    potassium chloride (MICRO-K) 10 MEQ CpSR Take 1 capsule (10 mEq total) by mouth once daily.    rOPINIRole (REQUIP) 4 MG tablet Take 1 tablet (4 mg total) by mouth once daily. Pt taking 4mg daily    sacubitriL-valsartan (ENTRESTO)  mg per tablet Take 1 tablet by mouth 2 (two) times daily.    scopolamine (TRANSDERM-SCOP) 1.3-1.5 mg (1 mg over 3 days) Place 1 patch onto the skin every 72 hours as needed (nausea).    tiotropium bromide (SPIRIVA RESPIMAT) 1.25 mcg/actuation inhaler Inhale 2 puffs into the lungs once daily. Controller    VENTOLIN HFA 90 mcg/actuation inhaler Inhale 2 puffs into the lungs every 6 (six) hours as needed for Shortness of Breath or Wheezing.    blood sugar diagnostic (ACCU-CHEK GUIDE TEST STRIPS) Strp 1 strip by Other route 3 (three) times daily.    cetirizine (ZYRTEC) 10 MG tablet Take 10 mg by mouth daily as needed for Allergies.    ergocalciferol (ERGOCALCIFEROL) 50,000 unit Cap Take 1 capsule (50,000 Units total) by mouth every 7 days. (Patient taking differently: Take 50,000 Units by mouth every Saturday.)    lancets (ACCU-CHEK SOFTCLIX LANCETS) Misc 1 Device by Misc.(Non-Drug; Combo Route) route 3 (three) times daily.    pregabalin (LYRICA) 25 MG capsule Take 1 capsule (25 mg total) by mouth 2 (two) times daily.    promethazine-codeine 6.25-10 mg/5 ml (PHENERGAN WITH CODEINE) 6.25-10 mg/5 mL syrup Take 5 mLs by mouth every 4 - 6 hours as needed.     Family History       Problem Relation (Age of Onset)    Cancer Mother    Cirrhosis Brother    Diabetes Brother    Heart attack Father    Heart disease Father, Sister, Brother, Sister, Brother    Hypertension Father, Brother          Tobacco Use    Smoking status: Never    Smokeless tobacco: Never   Substance and Sexual Activity    Alcohol use: Yes      Comment: up to 1 yr ago drank 2-3 drinks on occasion but sporadic    Drug use: No    Sexual activity: Yes     Partners: Male     Review of Systems   Constitutional: Negative for chills, decreased appetite, diaphoresis, fever, malaise/fatigue, night sweats, weight gain and weight loss.   HENT:  Negative for congestion, ear discharge, ear pain, hearing loss, hoarse voice, nosebleeds, odynophagia, sore throat, stridor and tinnitus.    Eyes:  Negative for blurred vision, discharge, double vision, pain, photophobia, redness, vision loss in left eye, vision loss in right eye, visual disturbance and visual halos.   Cardiovascular:  Positive for chest pain, dyspnea on exertion and leg swelling. Negative for claudication, cyanosis, irregular heartbeat, near-syncope, orthopnea, palpitations, paroxysmal nocturnal dyspnea and syncope.   Respiratory:  Positive for shortness of breath. Negative for cough, hemoptysis, sleep disturbances due to breathing, snoring, sputum production and wheezing.    Endocrine: Negative for cold intolerance, heat intolerance, polydipsia, polyphagia and polyuria.   Hematologic/Lymphatic: Negative for adenopathy and bleeding problem. Does not bruise/bleed easily.   Skin:  Negative for color change, dry skin, flushing, itching, nail changes, poor wound healing, rash, skin cancer, suspicious lesions and unusual hair distribution.   Musculoskeletal:  Negative for arthritis, back pain, falls, gout, joint pain, joint swelling, muscle cramps, muscle weakness, myalgias, neck pain and stiffness.   Gastrointestinal:  Positive for nausea and vomiting. Negative for bloating, abdominal pain, anorexia, change in bowel habit, bowel incontinence, constipation, diarrhea, dysphagia, excessive appetite, flatus, heartburn, hematemesis, hematochezia, hemorrhoids, jaundice and melena.   Genitourinary:  Negative for bladder incontinence, decreased libido, dysuria, flank pain, frequency, genital sores, hematuria,  hesitancy, incomplete emptying, nocturia and urgency.   Neurological:  Negative for aphonia, brief paralysis, difficulty with concentration, disturbances in coordination, excessive daytime sleepiness, dizziness, focal weakness, headaches, light-headedness, loss of balance, numbness, paresthesias, seizures, sensory change, tremors, vertigo and weakness.   Psychiatric/Behavioral:  Negative for altered mental status, depression, hallucinations, memory loss, substance abuse, suicidal ideas and thoughts of violence. The patient does not have insomnia and is not nervous/anxious.    Allergic/Immunologic: Negative for hives and persistent infections.   Objective:     Vital Signs (Most Recent):  Temp: 98 °F (36.7 °C) (02/22/23 0445)  Pulse: 74 (02/22/23 0800)  Resp: 16 (02/22/23 0732)  BP: 134/64 (02/22/23 0445)  SpO2: 97 % (02/22/23 0732) Vital Signs (24h Range):  Temp:  [97.9 °F (36.6 °C)-98.6 °F (37 °C)] 98 °F (36.7 °C)  Pulse:  [70-87] 74  Resp:  [16-90] 16  SpO2:  [95 %-98 %] 97 %  BP: (114-137)/(64-86) 134/64     Weight: 93.8 kg (206 lb 12.7 oz)  Body mass index is 33.38 kg/m².    SpO2: 97 %         Intake/Output Summary (Last 24 hours) at 2/22/2023 0848  Last data filed at 2/22/2023 0800  Gross per 24 hour   Intake 600 ml   Output 2600 ml   Net -2000 ml       Lines/Drains/Airways       Peripheral Intravenous Line  Duration                  Peripheral IV - Single Lumen 02/20/23 2010 20 G Anterior;Right Forearm 1 day                    Physical Exam  Constitutional:       General: She is not in acute distress.     Appearance: She is well-developed. She is not diaphoretic.   HENT:      Head: Normocephalic and atraumatic.      Nose: Nose normal.   Eyes:      General: No scleral icterus.        Right eye: No discharge.      Conjunctiva/sclera: Conjunctivae normal.      Pupils: Pupils are equal, round, and reactive to light.   Neck:      Thyroid: No thyromegaly.      Vascular: No JVD.      Trachea: No tracheal deviation.    Cardiovascular:      Rate and Rhythm: Normal rate and regular rhythm.      Pulses:           Carotid pulses are 2+ on the right side and 2+ on the left side.       Radial pulses are 2+ on the right side and 2+ on the left side.        Dorsalis pedis pulses are 2+ on the right side and 2+ on the left side.        Posterior tibial pulses are 2+ on the left side.      Heart sounds: Normal heart sounds. No murmur heard.    No friction rub. No gallop.   Pulmonary:      Effort: Pulmonary effort is normal. No respiratory distress.      Breath sounds: Normal breath sounds. No stridor. No wheezing or rales.   Chest:      Chest wall: No tenderness.   Abdominal:      General: Bowel sounds are normal. There is no distension.      Palpations: Abdomen is soft. There is no mass.      Tenderness: There is no abdominal tenderness. There is no guarding or rebound.   Musculoskeletal:         General: No tenderness. Normal range of motion.      Cervical back: Normal range of motion and neck supple.   Lymphadenopathy:      Cervical: No cervical adenopathy.   Skin:     General: Skin is warm and dry.      Coloration: Skin is not pale.      Findings: No erythema or rash.   Neurological:      Mental Status: She is alert and oriented to person, place, and time.      Cranial Nerves: No cranial nerve deficit.      Coordination: Coordination normal.   Psychiatric:         Behavior: Behavior normal.         Thought Content: Thought content normal.         Judgment: Judgment normal.       Significant Labs: ABG: No results for input(s): PH, PCO2, HCO3, POCSATURATED, BE in the last 48 hours., BMP:   Recent Labs   Lab 02/20/23 2026 02/21/23  0204 02/22/23  0603   * 86 67*    141 140   K 4.3 4.2 4.0    109 105   CO2 21* 22* 24   BUN 36* 35* 46*   CREATININE 2.1* 2.1* 2.3*   CALCIUM 9.3 8.9 8.8   MG  --  1.9  --    , CMP   Recent Labs   Lab 02/20/23 2026 02/21/23  0204 02/22/23  0603    141 140   K 4.3 4.2 4.0    109  105   CO2 21* 22* 24   * 86 67*   BUN 36* 35* 46*   CREATININE 2.1* 2.1* 2.3*   CALCIUM 9.3 8.9 8.8   PROT 6.7 6.3  --    ALBUMIN 3.7 3.6  --    BILITOT 1.5* 1.6*  --    ALKPHOS 57 54*  --    AST 22 23  --    ALT 36 35  --    ANIONGAP 12 10 11   , CBC   Recent Labs   Lab 02/20/23 2026 02/21/23  0204   WBC 5.12 4.48   HGB 8.5* 8.0*   HCT 28.4* 27.4*    172   , Troponin   Recent Labs   Lab 02/20/23 2026 02/21/23  0204 02/21/23  0804   TROPONINI 0.646* 0.629* 0.645*   , and All pertinent lab results from the last 24 hours have been reviewed.    Significant Imaging: Impression:     Stable cardiomegaly with mild pulmonary vascular congestion.  Overall findings suggestive of mild pulmonary edema secondary to CHF.        Electronically signed by: Jairo Johnson MD  Date:                                            02/20/2023  Time:                                           22:18    Sinus rhythm with 1st degree A-V block and  with frequent Premature   ventricular complexes   Left anterior fascicular block   Prolonged QT   Abnormal ECG   When compared with ECG of 19-DEC-2022 10:37,   No significant change was found   Confirmed by Heaven Miller MD ([id number]) on 1/4/2023 12:59:28 PM     Referred By: [id number]    SELF           Confirmed By:Heaven Miller MD      Assessment and Plan:     Class 1 obesity due to excess calories without serious comorbidity in adult  Weight loss encouraged.    Pulmonary hypertension  Documented on multiple right heart caths and Doppler echo studies.  Diuresis to continue.  She is New York heart Association functional capacity 3 chronically.    Aortic atherosclerosis  Condition stable.    NSVT (nonsustained ventricular tachycardia)  She demonstrates 3-4 beat runs of wide complex tachycardia on telemetry overnight.  Telemetry otherwise negative.    Type 2 diabetes mellitus without complication, without long-term current use of insulin  Most recent A1c 5.7.  She is on Jardiance.  Condition  stable.    Paroxysmal supraventricular tachycardia  She has a diagnosis of AV mima reentry tachycardia and has had ablation in the past.  No telemetric abnormalities noted.    ICD (implantable cardioverter-defibrillator) in place  She has a Biotronik device.  It has not fired since implant.  She is compliant with ICD interrogations.  No atrial fibrillation detected recently.    CKD (chronic kidney disease), stage IV  A chronic condition.  Initially in 2017 her creatinine was normal.  There are discussions of possible renal transplant at time of heart transplant being discussed.  Bumex dosing to be continued on discharge.  Her serum creatinine has increased some with diuresis during this hospitalization.    Acute on chronic combined systolic and diastolic heart failure  Most recent echo November 2022 with ejection fraction estimated to be 20%.  She has half wrapped dating back to 2017.  It is a nonischemic cardiomyopathy by diagnosis.    Paroxysmal atrial fibrillation  Eliquis to continue.  Telemetry negative for AFib or PACs.    MELISSA (obstructive sleep apnea)  Untreated at this time.  She does have home oxygen to use on an as-needed basis.    Essential hypertension  I have increased her Toprol dosing to 50 mg 2 times daily.  Entresto to continue.  Her blood pressure was elevated with diastolic readings 100 mmHg range on admission.  Her readings are better at this time post diuresis.  Close follow-up planned.    Elevated troponin  In setting of elevated blood pressure and volume overload.  Type 2 NSTEMI confirmed.  There is documentation of nonischemic cardiomyopathy.  Her troponins have peaked.  ACS not a consideration at this time.      VTE Risk Mitigation (From admission, onward)         Ordered     apixaban tablet 5 mg  2 times daily         02/20/23 0497     IP VTE HIGH RISK PATIENT  Once         02/20/23 2338     Place sequential compression device  Until discontinued         02/20/23 2338     Place LILIANA hose   Until discontinued         02/20/23 7362                Management and plan discussed with the patient and primary care team.  She has had recent changes in her diuretic regimen at home which could explain her presenting fluid overload state.    I plan on arranging close follow-up as outpatient.  Possible release later today given improvement in volume status and heart failure symptoms.    She will also be following up with the transplant team at [facility] in the near future.    More than 50% of total time was spent counseling and coordinating care of the patient with the nursing staff and hospitalist.  We discussed compliancy of medications and monitoring blood pressures more carefully at home.    Thank you for your consult. I will follow-up with patient. Please contact us if you have any additional questions.    Dom Montero MD  Cardiology   [facility] (3rd Fl)

## 2023-02-22 NOTE — ASSESSMENT & PLAN NOTE
A chronic condition.  Initially in 2017 her creatinine was normal.  There are discussions of possible renal transplant at time of heart transplant being discussed.  Bumex dosing to be continued on discharge.  Her serum creatinine has increased some with diuresis during this hospitalization.

## 2023-02-22 NOTE — HPI
2/22/2023: She presented to the ED with blood pressure 150/100 mm Hg and sinus tachycardia.  She has advanced heart failure with EF 20%.  She has frequent hospitalizations for heart failure.  She has a defibrillator.  It has not fired in the past.  She has paroxysmal AFib and takes Eliquis.  In the ED leg swelling and jugular venous distention are documented.  She has diuresed and is breathing better.  She remains on oxygen.  She had low sats in the 80s on presentation.  Telemetry has shown short runs of ventricular tachycardia overnight.  She has diuresed well.  She has been on furosemide 80 mg 2 times daily.    She recently saw the transplant team.  She was converted to Bumex.  She takes Entresto and Toprol.  She had not been checking her blood pressures at home.  She has sleep apnea but no machine.  She has been compliant with medications including Entresto and Toprol.  She has chest pain at times.  There is documentation of nonischemic cardiomyopathy.  She did have troponins as elevated as 0.6 ng/ml.  Her BNP was greater than 4000 on admission.    Reviewing the transplant consultation evaluation there are issues surrounding persistent pulmonary dysfunction.  There is close follow-up scheduled in the near future.  She has chronic [place] heart Association functional capacity 3 status.  She states she had been doing well until the day of admission.  She also had nausea vomiting as well as shortness of breath.

## 2023-02-22 NOTE — ASSESSMENT & PLAN NOTE
Supplemental O2 via nasal canula; titrate O2 saturation to >92%.  Serial Cardiac enzymes × 3.  Diuretic administration. Monitor electrolytes for hypokalemia and hypomagnesemia.  Cardiology Consultation.  2-D Echocardiogram for evaluation of left ventricle systolic function : EF 10 %  Daily weights.  Strict I/Os.  Fluid restriction.  Na restriction <2g/d.  .  2/22  Patient diuresed 3liters since admission.  Plan for discharge after dose of IV lasix 80mg today and take bumex tonight.  Will decrease Jardiance from 25mg to 10mg qd.  Will decrease entresto to half dose due to bump in creatinine.

## 2023-02-22 NOTE — ASSESSMENT & PLAN NOTE
In setting of elevated blood pressure and volume overload.  Type 2 NSTEMI confirmed.  There is documentation of nonischemic cardiomyopathy.  Her troponins have peaked.  ACS not a consideration at this time.

## 2023-02-22 NOTE — SUBJECTIVE & OBJECTIVE
Past Medical History:   Diagnosis Date    Anemia     Arthritis     Atrial fibrillation     OAC    Chronic respiratory failure with hypoxia, on home oxygen therapy     2L with activity, off at rest.  Per Pulm  no overt evidence of ILD or COPD on PFTs and CT to explain O2 needs.    CKD (chronic kidney disease), stage IV 05/08/2018    Congestive heart failure     s/p AICD placement,    Deep vein thrombosis     Depression     elevated bilirubin d/t Gilbert's syndrome     confirmed by [place] genetic testing, evaluated by hepatology    Encounter for blood transfusion     GERD (gastroesophageal reflux disease)     Hypertension     Pheochromocytoma, malignant     Right kidney mass     Sleep apnea     Thalassemia trait, alpha     Thyroid disease     Type 2 diabetes mellitus with hyperglycemia, without long-term current use of insulin 08/13/2020       Past Surgical History:   Procedure Laterality Date    APPENDECTOMY      BONE MARROW BIOPSY      CARDIAC DEFIBRILLATOR PLACEMENT Left 12/2016    CARDIAC ELECTROPHYSIOLOGY MAPPING AND ABLATION      CARDIAC ELECTROPHYSIOLOGY MAPPING AND ABLATION      COLONOSCOPY N/A 5/6/2022    Procedure: COLONOSCOPY;  Surgeon: Arely Betancourt MD;  Location: [facility] ([address] [place] [address]);  Service: Endoscopy;  Laterality: N/A;  heart transplant candidate/ EF 25% - 2nd floor/ defib - Biotronik - ER[facility]  Eliquis - per Dr. Cortez with [facility] [place] [facility], Pt ok to hold Eliquis x 2 days prior-see media tab-outside correspondence dated 12/30/21  - [facility]  verbal instructions/portal instructions/email instructions - s    EYE SURGERY      due to running tears    FRACTURE SURGERY Left     hand 5th digit    HYSTERECTOMY      KNEE SURGERY Left 2016    hematoma    LIVER BIOPSY  10/24/2018    Minimal steatosis, predominantly macrovesicular, 1%, Minimal nonspecific portal inflammation, no fibrosis. No findings on biopsy to explain elevated bilirubin levels. Could be d/t Gilbert's =?- hemolysis    RIGHT HEART CATHETERIZATION Right  "12/07/2021    Procedure: INSERTION, CATHETER, RIGHT HEART;  Surgeon: Irving Cardenas MD;  Location: [facility] CATH LAB;  Service: Cardiology;  Laterality: Right;    RIGHT HEART CATHETERIZATION Right 12/19/2022    Procedure: INSERTION, CATHETER, RIGHT HEART;  Surgeon: Burke Camilo MD;  Location: [facility] CATH LAB;  Service: Cardiology;  Laterality: Right;    TRANSJUGULAR BIOPSY OF LIVER N/A 10/24/2018    Procedure: BIOPSY, LIVER, TRANSJUGULAR APPROACH;  Surgeon: [facility];  Location: [facility] OR [facility] FLR;  Service: Radiology;  Laterality: N/A;       Review of patient's allergies indicates:   Allergen Reactions    Penicillins Hives and Other (See Comments)    Iodinated contrast media Nausea And Vomiting    Oxycodone-acetaminophen Other (See Comments)     Nausea, Dizziness, Anxiety.  "I don't like how it makes me feel."   Given Hydromorphone 0.5mg IVP  Without problems.  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 Metabolism of other drugs not listed here may also be impacted by UGT1A1 enzyme activity.       Tramadol Nausea And Vomiting and Other (See Comments)     Other reaction(s): Other (See Comments)    Valsartan Other (See Comments)       Current Facility-Administered Medications on File Prior to Encounter   Medication    0.9%  NaCl infusion    vancomycin in dextrose 5 % 1 gram/250 mL IVPB 1,000 mg     Current Outpatient Medications on File Prior to Encounter "   Medication Sig    albuterol-ipratropium (DUO-NEB) 2.5 mg-0.5 mg/3 mL nebulizer solution Take 3 mLs by nebulization 2 (two) times a day.    allopurinoL (ZYLOPRIM) 100 MG tablet Take 1 tablet (100 mg total) by mouth daily as needed (gout attack).    ALPRAZolam (XANAX) 2 MG Tab Take 0.25-2 mg by mouth 2 (two) times daily as needed (anxiety).    apixaban (ELIQUIS) 5 mg Tab Take 1 tablet (5 mg total) by mouth 2 (two) times daily.    atorvastatin (LIPITOR) 40 MG tablet Take 1 tablet (40 mg total) by mouth every evening.    b complex vitamins tablet Take 1 tablet by mouth once daily.    bumetanide (BUMEX) 2 MG tablet Take 2 tablets (4 mg total) by mouth 2 (two) times a day.    diclofenac sodium (VOLTAREN) 1 % Gel APPLY 2 G TOPICALLY 3 (THREE) TIMES DAILY AS NEEDED (PAIN).    ferrous sulfate 325 (65 FE) MG EC tablet Take 1 tablet (325 mg total) by mouth once daily.    fluticasone propionate (FLONASE) 50 mcg/actuation nasal spray 2 sprays by Each Nostril route daily as needed for Rhinitis.     glimepiride (AMARYL) 2 MG tablet Take 0.5 tablets (1 mg total) by mouth once daily.    isosorbide dinitrate (ISORDIL) 30 MG Tab Take 1 tablet (30 mg total) by mouth 3 (three) times daily.    LIDOcaine (LIDODERM) 5 % Place 1 patch onto the skin daily as needed (pain). Remove & Discard patch within 12 hours or as directed by MD    metoprolol succinate (TOPROL-XL) 50 MG 24 hr tablet Take 1 tablet (50 mg total) by mouth once daily.    mometasone-formoterol (DULERA) 200-5 mcg/actuation inhaler Inhale 2 puffs into the lungs 2 (two) times daily. Controller    NITROSTAT 0.4 mg SL tablet Take 2.5 tablets (1 mg total) by mouth every 5 (five) minutes as needed for Chest pain. No more than 3 tablets in 15 minutes.    ondansetron (ZOFRAN-ODT) 4 MG TbDL Take 1 tablet (4 mg total) by mouth every 12 (twelve) hours as needed (nausea).    pantoprazole (PROTONIX) 40 MG tablet TAKE 1 TABLET BY MOUTH DAILY IN THE MORNING    potassium chloride (MICRO-K)  10 MEQ CpSR Take 1 capsule (10 mEq total) by mouth once daily.    rOPINIRole (REQUIP) 4 MG tablet Take 1 tablet (4 mg total) by mouth once daily. Pt taking 4mg daily    scopolamine (TRANSDERM-SCOP) 1.3-1.5 mg (1 mg over 3 days) Place 1 patch onto the skin every 72 hours as needed (nausea).    tiotropium bromide (SPIRIVA RESPIMAT) 1.25 mcg/actuation inhaler Inhale 2 puffs into the lungs once daily. Controller    VENTOLIN HFA 90 mcg/actuation inhaler Inhale 2 puffs into the lungs every 6 (six) hours as needed for Shortness of Breath or Wheezing.    [DISCONTINUED] empagliflozin (JARDIANCE) 25 mg tablet Take 25 mg by mouth once daily.    [DISCONTINUED] sacubitriL-valsartan (ENTRESTO)  mg per tablet Take 1 tablet by mouth 2 (two) times daily.    blood sugar diagnostic (ACCU-CHEK GUIDE TEST STRIPS) Strp 1 strip by Other route 3 (three) times daily.    cetirizine (ZYRTEC) 10 MG tablet Take 10 mg by mouth daily as needed for Allergies.    ergocalciferol (ERGOCALCIFEROL) 50,000 unit Cap Take 1 capsule (50,000 Units total) by mouth every 7 days. (Patient taking differently: Take 50,000 Units by mouth every Saturday.)    lancets (ACCU-CHEK SOFTCLIX LANCETS) Misc 1 Device by Misc.(Non-Drug; Combo Route) route 3 (three) times daily.    pregabalin (LYRICA) 25 MG capsule Take 1 capsule (25 mg total) by mouth 2 (two) times daily.    [DISCONTINUED] promethazine-codeine 6.25-10 mg/5 ml (PHENERGAN WITH CODEINE) 6.25-10 mg/5 mL syrup Take 5 mLs by mouth every 4 - 6 hours as needed.     Family History       Problem Relation (Age of Onset)    Cancer Mother    Cirrhosis Brother    Diabetes Brother    Heart attack Father    Heart disease Father, Sister, Brother, Sister, Brother    Hypertension Father, Brother          Tobacco Use    Smoking status: Never    Smokeless tobacco: Never   Substance and Sexual Activity    Alcohol use: Yes     Comment: up to 1 yr ago drank 2-3 drinks on occasion but sporadic    Drug use: No    Sexual  activity: Yes     Partners: Male     Review of Systems   Constitutional:  Positive for fatigue. Negative for appetite change and fever.   HENT:  Negative for congestion, ear pain, postnasal drip, rhinorrhea and sinus pressure.    Eyes:  Negative for photophobia, pain and visual disturbance.   Respiratory:  Positive for cough. Negative for shortness of breath and wheezing.    Cardiovascular:  Negative for chest pain, palpitations and leg swelling.   Gastrointestinal:  Negative for abdominal pain, constipation and vomiting.   Endocrine: Negative for cold intolerance and heat intolerance.   Genitourinary:  Negative for dysuria, flank pain, frequency and hematuria.   Musculoskeletal:  Negative for arthralgias and myalgias.   Skin:  Negative for pallor and rash.   Allergic/Immunologic: Negative for immunocompromised state.   Neurological:  Positive for headaches. Negative for dizziness, syncope, weakness, light-headedness and numbness.   Hematological:  Does not bruise/bleed easily.   Psychiatric/Behavioral:  Negative for agitation, confusion and dysphoric mood. The patient is not nervous/anxious.    Objective:     Vital Signs (Most Recent):  Temp: 98 °F (36.7 °C) (02/22/23 0928)  Pulse: 86 (02/22/23 0959)  Resp: 16 (02/22/23 0732)  BP: 135/74 (02/22/23 0928)  SpO2: 95 % (02/22/23 0928) Vital Signs (24h Range):  Temp:  [97.9 °F (36.6 °C)-98.6 °F (37 °C)] 98 °F (36.7 °C)  Pulse:  [70-87] 86  Resp:  [16-90] 16  SpO2:  [94 %-98 %] 95 %  BP: (114-137)/(64-86) 135/74     Weight: 93.8 kg (206 lb 12.7 oz)  Body mass index is 33.38 kg/m².    Physical Exam  Constitutional:       Appearance: She is obese. She is not ill-appearing.   Cardiovascular:      Rate and Rhythm: Normal rate and regular rhythm.      Pulses: Normal pulses.   Pulmonary:      Effort: Pulmonary effort is normal.      Breath sounds: Normal breath sounds. No wheezing.   Abdominal:      General: Bowel sounds are normal.      Palpations: Abdomen is soft.       Tenderness: There is no abdominal tenderness.   Musculoskeletal:      Right lower leg: No edema.      Left lower leg: No edema.   Skin:     General: Skin is warm.   Neurological:      Mental Status: She is alert.           Significant Labs: All pertinent labs within the past 24 hours have been reviewed.  ABGs: No results for input(s): PH, PCO2, HCO3, POCSATURATED, BE, TOTALHB, COHB, METHB, O2HB, POCFIO2, PO2 in the last 48 hours.  CBC:   Recent Labs   Lab 02/20/23 2026 02/21/23 0204   WBC 5.12 4.48   HGB 8.5* 8.0*   HCT 28.4* 27.4*    172       CMP:   Recent Labs   Lab 02/20/23 2026 02/21/23 0204 02/22/23  0603    141 140   K 4.3 4.2 4.0    109 105   CO2 21* 22* 24   * 86 67*   BUN 36* 35* 46*   CREATININE 2.1* 2.1* 2.3*   CALCIUM 9.3 8.9 8.8   PROT 6.7 6.3  --    ALBUMIN 3.7 3.6  --    BILITOT 1.5* 1.6*  --    ALKPHOS 57 54*  --    AST 22 23  --    ALT 36 35  --    ANIONGAP 12 10 11       Coagulation: No results for input(s): PT, INR, APTT in the last 48 hours.  Lactic Acid: No results for input(s): LACTATE in the last 48 hours.  Troponin:   Recent Labs   Lab 02/20/23 2026 02/21/23 0204 02/21/23  0804   TROPONINI 0.646* 0.629* 0.645*     Normal sinus rhythm Possible Left atrial enlargement Left anterior fascicular block LVH with repolarization abnormality Abnormal ECG When compared with ECG of 30-JAN-2023 08:52, No significant change was found 25mm/s 10mm/mV 150Hz 9.0.7 12SL 241 HD JACIEL: 997 Referred by: JOSE MARIA COATS  Significant Imaging: I have reviewed all pertinent imaging results/findings within the past 24 hours.  CXR: I have reviewed all pertinent results/findings within the past 24 hours and my personal findings are:       Stable cardiomegaly with mild pulmonary vascular congestion.  Overall findings suggestive of mild pulmonary edema secondary to CHF.

## 2023-02-22 NOTE — ASSESSMENT & PLAN NOTE
She has a Biotronik device.  It has not fired since implant.  She is compliant with ICD interrogations.  No atrial fibrillation detected recently.

## 2023-02-22 NOTE — ASSESSMENT & PLAN NOTE
Patient's FSGs are controlled on current medication regimen.  Last A1c reviewed-   Lab Results   Component Value Date    HGBA1C 4.6 01/30/2023     Most recent fingerstick glucose reviewed-   Recent Labs   Lab 02/21/23  1701 02/21/23  2121 02/22/23  0708 02/22/23  1104   POCTGLUCOSE 76 81 85 133*     Current correctional scale  Low  Maintain anti-hyperglycemic dose as follows-   Antihyperglycemics (From admission, onward)    None        Hold Oral hypoglycemics while patient is in the hospital.

## 2023-02-22 NOTE — SUBJECTIVE & OBJECTIVE
Past Medical History:   Diagnosis Date    Anemia     Arthritis     Atrial fibrillation     OAC    Chronic respiratory failure with hypoxia, on home oxygen therapy     2L with activity, off at rest.  Per Pulm  no overt evidence of ILD or COPD on PFTs and CT to explain O2 needs.    CKD (chronic kidney disease), stage IV 05/08/2018    Congestive heart failure     s/p AICD placement,    Deep vein thrombosis     Depression     elevated bilirubin d/t Gilbert's syndrome     confirmed by [place] genetic testing, evaluated by hepatology    Encounter for blood transfusion     GERD (gastroesophageal reflux disease)     Hypertension     Pheochromocytoma, malignant     Right kidney mass     Sleep apnea     Thalassemia trait, alpha     Thyroid disease     Type 2 diabetes mellitus with hyperglycemia, without long-term current use of insulin 08/13/2020       Past Surgical History:   Procedure Laterality Date    APPENDECTOMY      BONE MARROW BIOPSY      CARDIAC DEFIBRILLATOR PLACEMENT Left 12/2016    CARDIAC ELECTROPHYSIOLOGY MAPPING AND ABLATION      CARDIAC ELECTROPHYSIOLOGY MAPPING AND ABLATION      COLONOSCOPY N/A 5/6/2022    Procedure: COLONOSCOPY;  Surgeon: Arely Betancourt MD;  Location: [facility] ([address] [place] [address]);  Service: Endoscopy;  Laterality: N/A;  heart transplant candidate/ EF 25% - 2nd floor/ defib - Biotronik - ERW  Eliquis - per Dr. Cortez with [facility] [place], Pt ok to hold Eliquis x 2 days prior-see media tab-outside correspondence dated 12/30/21  - ERW  verbal instructions/portal instructions/email instructions - s    EYE SURGERY      due to running tears    FRACTURE SURGERY Left     hand 5th digit    HYSTERECTOMY      KNEE SURGERY Left 2016    hematoma    LIVER BIOPSY  10/24/2018    Minimal steatosis, predominantly macrovesicular, 1%, Minimal nonspecific portal inflammation, no fibrosis. No findings on biopsy to explain elevated bilirubin levels. Could be d/t Gilbert's =?- hemolysis    RIGHT HEART CATHETERIZATION Right  "12/07/2021    Procedure: INSERTION, CATHETER, RIGHT HEART;  Surgeon: Irving Cardneas MD;  Location: [facility] CATH LAB;  Service: Cardiology;  Laterality: Right;    RIGHT HEART CATHETERIZATION Right 12/19/2022    Procedure: INSERTION, CATHETER, RIGHT HEART;  Surgeon: Burke Camilo MD;  Location: [facility] CATH LAB;  Service: Cardiology;  Laterality: Right;    TRANSJUGULAR BIOPSY OF LIVER N/A 10/24/2018    Procedure: BIOPSY, LIVER, TRANSJUGULAR APPROACH;  [facility];  Location: [facility] OR [facility]LR;  Service: Radiology;  Laterality: N/A;       Review of patient's allergies indicates:   Allergen Reactions    Penicillins Hives and Other (See Comments)    Iodinated contrast media Nausea And Vomiting    Oxycodone-acetaminophen Other (See Comments)     Nausea, Dizziness, Anxiety.  "I don't like how it makes me feel."   Given Hydromorphone 0.5mg IVP  Without problems.  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 Metabolism of other drugs not listed here may also be impacted by UGT1A1 enzyme activity.       Tramadol Nausea And Vomiting and Other (See Comments)     Other reaction(s): Other (See Comments)    Valsartan Other (See Comments)       Current Facility-Administered Medications on File Prior to Encounter   Medication    0.9%  NaCl infusion    vancomycin in dextrose 5 % 1 gram/250 mL IVPB 1,000 mg     Current Outpatient Medications on File Prior to Encounter "   Medication Sig    albuterol-ipratropium (DUO-NEB) 2.5 mg-0.5 mg/3 mL nebulizer solution Take 3 mLs by nebulization 2 (two) times a day.    allopurinoL (ZYLOPRIM) 100 MG tablet Take 1 tablet (100 mg total) by mouth daily as needed (gout attack).    ALPRAZolam (XANAX) 2 MG Tab Take 0.25-2 mg by mouth 2 (two) times daily as needed (anxiety).    apixaban (ELIQUIS) 5 mg Tab Take 1 tablet (5 mg total) by mouth 2 (two) times daily.    atorvastatin (LIPITOR) 40 MG tablet Take 1 tablet (40 mg total) by mouth every evening.    b complex vitamins tablet Take 1 tablet by mouth once daily.    bumetanide (BUMEX) 2 MG tablet Take 2 tablets (4 mg total) by mouth 2 (two) times a day.    diclofenac sodium (VOLTAREN) 1 % Gel APPLY 2 G TOPICALLY 3 (THREE) TIMES DAILY AS NEEDED (PAIN).    empagliflozin (JARDIANCE) 25 mg tablet Take 25 mg by mouth once daily.    ferrous sulfate 325 (65 FE) MG EC tablet Take 1 tablet (325 mg total) by mouth once daily.    fluticasone propionate (FLONASE) 50 mcg/actuation nasal spray 2 sprays by Each Nostril route daily as needed for Rhinitis.     glimepiride (AMARYL) 2 MG tablet Take 0.5 tablets (1 mg total) by mouth once daily.    isosorbide dinitrate (ISORDIL) 30 MG Tab Take 1 tablet (30 mg total) by mouth 3 (three) times daily.    LIDOcaine (LIDODERM) 5 % Place 1 patch onto the skin daily as needed (pain). Remove & Discard patch within 12 hours or as directed by MD    metoprolol succinate (TOPROL-XL) 50 MG 24 hr tablet Take 1 tablet (50 mg total) by mouth once daily.    mometasone-formoterol (DULERA) 200-5 mcg/actuation inhaler Inhale 2 puffs into the lungs 2 (two) times daily. Controller    NITROSTAT 0.4 mg SL tablet Take 2.5 tablets (1 mg total) by mouth every 5 (five) minutes as needed for Chest pain. No more than 3 tablets in 15 minutes.    ondansetron (ZOFRAN-ODT) 4 MG TbDL Take 1 tablet (4 mg total) by mouth every 12 (twelve) hours as needed (nausea).    pantoprazole (PROTONIX) 40 MG tablet  TAKE 1 TABLET BY MOUTH DAILY IN THE MORNING    potassium chloride (MICRO-K) 10 MEQ CpSR Take 1 capsule (10 mEq total) by mouth once daily.    rOPINIRole (REQUIP) 4 MG tablet Take 1 tablet (4 mg total) by mouth once daily. Pt taking 4mg daily    sacubitriL-valsartan (ENTRESTO)  mg per tablet Take 1 tablet by mouth 2 (two) times daily.    scopolamine (TRANSDERM-SCOP) 1.3-1.5 mg (1 mg over 3 days) Place 1 patch onto the skin every 72 hours as needed (nausea).    tiotropium bromide (SPIRIVA RESPIMAT) 1.25 mcg/actuation inhaler Inhale 2 puffs into the lungs once daily. Controller    VENTOLIN HFA 90 mcg/actuation inhaler Inhale 2 puffs into the lungs every 6 (six) hours as needed for Shortness of Breath or Wheezing.    blood sugar diagnostic (ACCU-CHEK GUIDE TEST STRIPS) Strp 1 strip by Other route 3 (three) times daily.    cetirizine (ZYRTEC) 10 MG tablet Take 10 mg by mouth daily as needed for Allergies.    ergocalciferol (ERGOCALCIFEROL) 50,000 unit Cap Take 1 capsule (50,000 Units total) by mouth every 7 days. (Patient taking differently: Take 50,000 Units by mouth every Saturday.)    lancets (ACCU-CHEK SOFTCLIX LANCETS) Misc 1 Device by Misc.(Non-Drug; Combo Route) route 3 (three) times daily.    pregabalin (LYRICA) 25 MG capsule Take 1 capsule (25 mg total) by mouth 2 (two) times daily.    promethazine-codeine 6.25-10 mg/5 ml (PHENERGAN WITH CODEINE) 6.25-10 mg/5 mL syrup Take 5 mLs by mouth every 4 - 6 hours as needed.     Family History       Problem Relation (Age of Onset)    Cancer Mother    Cirrhosis Brother    Diabetes Brother    Heart attack Father    Heart disease Father, Sister, Brother, Sister, Brother    Hypertension Father, Brother          Tobacco Use    Smoking status: Never    Smokeless tobacco: Never   Substance and Sexual Activity    Alcohol use: Yes     Comment: up to 1 yr ago drank 2-3 drinks on occasion but sporadic    Drug use: No    Sexual activity: Yes     Partners: Male     Review of  Systems   Constitutional: Negative for chills, decreased appetite, diaphoresis, fever, malaise/fatigue, night sweats, weight gain and weight loss.   HENT:  Negative for congestion, ear discharge, ear pain, hearing loss, hoarse voice, nosebleeds, odynophagia, sore throat, stridor and tinnitus.    Eyes:  Negative for blurred vision, discharge, double vision, pain, photophobia, redness, vision loss in left eye, vision loss in right eye, visual disturbance and visual halos.   Cardiovascular:  Positive for chest pain, dyspnea on exertion and leg swelling. Negative for claudication, cyanosis, irregular heartbeat, near-syncope, orthopnea, palpitations, paroxysmal nocturnal dyspnea and syncope.   Respiratory:  Positive for shortness of breath. Negative for cough, hemoptysis, sleep disturbances due to breathing, snoring, sputum production and wheezing.    Endocrine: Negative for cold intolerance, heat intolerance, polydipsia, polyphagia and polyuria.   Hematologic/Lymphatic: Negative for adenopathy and bleeding problem. Does not bruise/bleed easily.   Skin:  Negative for color change, dry skin, flushing, itching, nail changes, poor wound healing, rash, skin cancer, suspicious lesions and unusual hair distribution.   Musculoskeletal:  Negative for arthritis, back pain, falls, gout, joint pain, joint swelling, muscle cramps, muscle weakness, myalgias, neck pain and stiffness.   Gastrointestinal:  Positive for nausea and vomiting. Negative for bloating, abdominal pain, anorexia, change in bowel habit, bowel incontinence, constipation, diarrhea, dysphagia, excessive appetite, flatus, heartburn, hematemesis, hematochezia, hemorrhoids, jaundice and melena.   Genitourinary:  Negative for bladder incontinence, decreased libido, dysuria, flank pain, frequency, genital sores, hematuria, hesitancy, incomplete emptying, nocturia and urgency.   Neurological:  Negative for aphonia, brief paralysis, difficulty with concentration,  disturbances in coordination, excessive daytime sleepiness, dizziness, focal weakness, headaches, light-headedness, loss of balance, numbness, paresthesias, seizures, sensory change, tremors, vertigo and weakness.   Psychiatric/Behavioral:  Negative for altered mental status, depression, hallucinations, memory loss, substance abuse, suicidal ideas and thoughts of violence. The patient does not have insomnia and is not nervous/anxious.    Allergic/Immunologic: Negative for hives and persistent infections.   Objective:     Vital Signs (Most Recent):  Temp: 98 °F (36.7 °C) (02/22/23 0445)  Pulse: 74 (02/22/23 0800)  Resp: 16 (02/22/23 0732)  BP: 134/64 (02/22/23 0445)  SpO2: 97 % (02/22/23 0732) Vital Signs (24h Range):  Temp:  [97.9 °F (36.6 °C)-98.6 °F (37 °C)] 98 °F (36.7 °C)  Pulse:  [70-87] 74  Resp:  [16-90] 16  SpO2:  [95 %-98 %] 97 %  BP: (114-137)/(64-86) 134/64     Weight: 93.8 kg (206 lb 12.7 oz)  Body mass index is 33.38 kg/m².    SpO2: 97 %         Intake/Output Summary (Last 24 hours) at 2/22/2023 0848  Last data filed at 2/22/2023 0800  Gross per 24 hour   Intake 600 ml   Output 2600 ml   Net -2000 ml       Lines/Drains/Airways       Peripheral Intravenous Line  Duration                  Peripheral IV - Single Lumen 02/20/23 2010 20 G Anterior;Right Forearm 1 day                    Physical Exam  Constitutional:       General: She is not in acute distress.     Appearance: She is well-developed. She is not diaphoretic.   HENT:      Head: Normocephalic and atraumatic.      Nose: Nose normal.   Eyes:      General: No scleral icterus.        Right eye: No discharge.      Conjunctiva/sclera: Conjunctivae normal.      Pupils: Pupils are equal, round, and reactive to light.   Neck:      Thyroid: No thyromegaly.      Vascular: No JVD.      Trachea: No tracheal deviation.   Cardiovascular:      Rate and Rhythm: Normal rate and regular rhythm.      Pulses:           Carotid pulses are 2+ on the right side and 2+  on the left side.       Radial pulses are 2+ on the right side and 2+ on the left side.        Dorsalis pedis pulses are 2+ on the right side and 2+ on the left side.        Posterior tibial pulses are 2+ on the left side.      Heart sounds: Normal heart sounds. No murmur heard.    No friction rub. No gallop.   Pulmonary:      Effort: Pulmonary effort is normal. No respiratory distress.      Breath sounds: Normal breath sounds. No stridor. No wheezing or rales.   Chest:      Chest wall: No tenderness.   Abdominal:      General: Bowel sounds are normal. There is no distension.      Palpations: Abdomen is soft. There is no mass.      Tenderness: There is no abdominal tenderness. There is no guarding or rebound.   Musculoskeletal:         General: No tenderness. Normal range of motion.      Cervical back: Normal range of motion and neck supple.   Lymphadenopathy:      Cervical: No cervical adenopathy.   Skin:     General: Skin is warm and dry.      Coloration: Skin is not pale.      Findings: No erythema or rash.   Neurological:      Mental Status: She is alert and oriented to person, place, and time.      Cranial Nerves: No cranial nerve deficit.      Coordination: Coordination normal.   Psychiatric:         Behavior: Behavior normal.         Thought Content: Thought content normal.         Judgment: Judgment normal.       Significant Labs: ABG: No results for input(s): PH, PCO2, HCO3, POCSATURATED, BE in the last 48 hours., BMP:   Recent Labs   Lab 02/20/23 2026 02/21/23  0204 02/22/23  0603   * 86 67*    141 140   K 4.3 4.2 4.0    109 105   CO2 21* 22* 24   BUN 36* 35* 46*   CREATININE 2.1* 2.1* 2.3*   CALCIUM 9.3 8.9 8.8   MG  --  1.9  --    , CMP   Recent Labs   Lab 02/20/23 2026 02/21/23  0204 02/22/23  0603    141 140   K 4.3 4.2 4.0    109 105   CO2 21* 22* 24   * 86 67*   BUN 36* 35* 46*   CREATININE 2.1* 2.1* 2.3*   CALCIUM 9.3 8.9 8.8   PROT 6.7 6.3  --    ALBUMIN 3.7  3.6  --    BILITOT 1.5* 1.6*  --    ALKPHOS 57 54*  --    AST 22 23  --    ALT 36 35  --    ANIONGAP 12 10 11   , CBC   Recent Labs   Lab 02/20/23 2026 02/21/23  0204   WBC 5.12 4.48   HGB 8.5* 8.0*   HCT 28.4* 27.4*    172   , Troponin   Recent Labs   Lab 02/20/23 2026 02/21/23  0204 02/21/23  0804   TROPONINI 0.646* 0.629* 0.645*   , and All pertinent lab results from the last 24 hours have been reviewed.    Significant Imaging: Impression:     Stable cardiomegaly with mild pulmonary vascular congestion.  Overall findings suggestive of mild pulmonary edema secondary to CHF.        Electronically signed by: Jairo Johnson MD  Date:                                            02/20/2023  Time:                                           22:18    Sinus rhythm with 1st degree A-V block and  with frequent Premature   ventricular complexes   Left anterior fascicular block   Prolonged QT   Abnormal ECG   When compared with ECG of 19-DEC-2022 10:37,   No significant change was found   Confirmed by Heaven Miller MD ([id number]) on 1/4/2023 12:59:28 PM     Referred By: [id number]    SELF           Confirmed By:Heaven Miller MD

## 2023-02-23 ENCOUNTER — PATIENT OUTREACH (OUTPATIENT)
Dept: ADMINISTRATIVE | Facility: CLINIC | Age: 58
End: 2023-02-23
Payer: MEDICAID

## 2023-02-23 ENCOUNTER — PATIENT MESSAGE (OUTPATIENT)
Dept: ADMINISTRATIVE | Facility: CLINIC | Age: 58
End: 2023-02-23
Payer: MEDICAID

## 2023-02-23 NOTE — PROGRESS NOTES
C3 nurse attempted to contact [facility] for a TCC post hospital discharge follow up call. The patient is unable to conduct the call @ this time. The patient requested a callback.    The patient has a scheduled [facility] appointment with Cristobal Ann MD on 03/03/2023 @ 1300. Message sent to Physician staff.

## 2023-02-24 ENCOUNTER — OFFICE VISIT (OUTPATIENT)
Dept: ENDOCRINOLOGY | Facility: CLINIC | Age: 58
End: 2023-02-24
Payer: MEDICAID

## 2023-02-24 ENCOUNTER — TELEPHONE (OUTPATIENT)
Dept: CARDIOTHORACIC SURGERY | Facility: CLINIC | Age: 58
End: 2023-02-24
Payer: MEDICAID

## 2023-02-24 ENCOUNTER — OFFICE VISIT (OUTPATIENT)
Dept: CARDIOLOGY | Facility: CLINIC | Age: 58
End: 2023-02-24
Payer: MEDICAID

## 2023-02-24 ENCOUNTER — TELEPHONE (OUTPATIENT)
Dept: PALLIATIVE MEDICINE | Facility: CLINIC | Age: 58
End: 2023-02-24
Payer: MEDICAID

## 2023-02-24 VITALS
RESPIRATION RATE: 16 BRPM | SYSTOLIC BLOOD PRESSURE: 126 MMHG | WEIGHT: 199.94 LBS | BODY MASS INDEX: 32.13 KG/M2 | HEIGHT: 66 IN | DIASTOLIC BLOOD PRESSURE: 82 MMHG | HEART RATE: 76 BPM | OXYGEN SATURATION: 91 %

## 2023-02-24 VITALS
SYSTOLIC BLOOD PRESSURE: 116 MMHG | RESPIRATION RATE: 16 BRPM | BODY MASS INDEX: 31.9 KG/M2 | HEIGHT: 66 IN | HEART RATE: 85 BPM | WEIGHT: 198.5 LBS | DIASTOLIC BLOOD PRESSURE: 80 MMHG

## 2023-02-24 DIAGNOSIS — I47.10 PAROXYSMAL SUPRAVENTRICULAR TACHYCARDIA: Chronic | ICD-10-CM

## 2023-02-24 DIAGNOSIS — I51.7 LEFT VENTRICULAR HYPERTROPHY: Chronic | ICD-10-CM

## 2023-02-24 DIAGNOSIS — N18.4 CKD (CHRONIC KIDNEY DISEASE), STAGE IV: ICD-10-CM

## 2023-02-24 DIAGNOSIS — E11.22 TYPE 2 DIABETES MELLITUS WITH STAGE 4 CHRONIC KIDNEY DISEASE, WITHOUT LONG-TERM CURRENT USE OF INSULIN: ICD-10-CM

## 2023-02-24 DIAGNOSIS — N18.4 TYPE 2 DIABETES MELLITUS WITH STAGE 4 CHRONIC KIDNEY DISEASE, WITH LONG-TERM CURRENT USE OF INSULIN: ICD-10-CM

## 2023-02-24 DIAGNOSIS — I50.42 CHRONIC COMBINED SYSTOLIC AND DIASTOLIC HEART FAILURE: Primary | Chronic | ICD-10-CM

## 2023-02-24 DIAGNOSIS — E66.09 CLASS 1 OBESITY DUE TO EXCESS CALORIES WITHOUT SERIOUS COMORBIDITY WITH BODY MASS INDEX (BMI) OF 32.0 TO 32.9 IN ADULT: ICD-10-CM

## 2023-02-24 DIAGNOSIS — I25.10 CORONARY ARTERY DISEASE, UNSPECIFIED VESSEL OR LESION TYPE, UNSPECIFIED WHETHER ANGINA PRESENT, UNSPECIFIED WHETHER NATIVE OR TRANSPLANTED HEART: ICD-10-CM

## 2023-02-24 DIAGNOSIS — Z79.4 TYPE 2 DIABETES MELLITUS WITH STAGE 4 CHRONIC KIDNEY DISEASE, WITH LONG-TERM CURRENT USE OF INSULIN: ICD-10-CM

## 2023-02-24 DIAGNOSIS — E11.22 TYPE 2 DIABETES MELLITUS WITH STAGE 4 CHRONIC KIDNEY DISEASE, WITH LONG-TERM CURRENT USE OF INSULIN: ICD-10-CM

## 2023-02-24 DIAGNOSIS — Z79.899 DVT PROPHYLAXIS: ICD-10-CM

## 2023-02-24 DIAGNOSIS — R79.89 ELEVATED TROPONIN: ICD-10-CM

## 2023-02-24 DIAGNOSIS — I42.8 NICM (NONISCHEMIC CARDIOMYOPATHY): Chronic | ICD-10-CM

## 2023-02-24 DIAGNOSIS — I13.0 HYPERTENSIVE CARDIOVASCULAR-RENAL DISEASE, STAGE 1-4 OR UNSPECIFIED CHRONIC KIDNEY DISEASE, WITH HEART FAILURE: ICD-10-CM

## 2023-02-24 DIAGNOSIS — I24.89 DEMAND ISCHEMIA: ICD-10-CM

## 2023-02-24 DIAGNOSIS — I50.9 CONGESTIVE HEART FAILURE, UNSPECIFIED HF CHRONICITY, UNSPECIFIED HEART FAILURE TYPE: ICD-10-CM

## 2023-02-24 DIAGNOSIS — I47.29 NSVT (NONSUSTAINED VENTRICULAR TACHYCARDIA): ICD-10-CM

## 2023-02-24 DIAGNOSIS — E27.8 ADRENAL MASS 1 CM TO 4 CM IN DIAMETER: ICD-10-CM

## 2023-02-24 DIAGNOSIS — I10 ESSENTIAL HYPERTENSION: Chronic | ICD-10-CM

## 2023-02-24 DIAGNOSIS — I48.0 PAROXYSMAL ATRIAL FIBRILLATION: Chronic | ICD-10-CM

## 2023-02-24 DIAGNOSIS — I70.0 AORTIC ATHEROSCLEROSIS: ICD-10-CM

## 2023-02-24 DIAGNOSIS — Z95.810 ICD (IMPLANTABLE CARDIOVERTER-DEFIBRILLATOR) IN PLACE: Chronic | ICD-10-CM

## 2023-02-24 DIAGNOSIS — R94.31 PROLONGED Q-T INTERVAL ON ECG: ICD-10-CM

## 2023-02-24 DIAGNOSIS — G47.33 OSA (OBSTRUCTIVE SLEEP APNEA): Chronic | ICD-10-CM

## 2023-02-24 DIAGNOSIS — N18.4 TYPE 2 DIABETES MELLITUS WITH STAGE 4 CHRONIC KIDNEY DISEASE, WITHOUT LONG-TERM CURRENT USE OF INSULIN: ICD-10-CM

## 2023-02-24 PROCEDURE — 3074F SYST BP LT 130 MM HG: CPT | Mod: CPTII,NTX,, | Performed by: STUDENT IN AN ORGANIZED HEALTH CARE EDUCATION/TRAINING PROGRAM

## 2023-02-24 PROCEDURE — 3008F PR BODY MASS INDEX (BMI) DOCUMENTED: ICD-10-PCS | Mod: CPTII,TXP,, | Performed by: NURSE PRACTITIONER

## 2023-02-24 PROCEDURE — 3008F BODY MASS INDEX DOCD: CPT | Mod: CPTII,NTX,, | Performed by: STUDENT IN AN ORGANIZED HEALTH CARE EDUCATION/TRAINING PROGRAM

## 2023-02-24 PROCEDURE — 1160F PR REVIEW ALL MEDS BY PRESCRIBER/CLIN PHARMACIST DOCUMENTED: ICD-10-PCS | Mod: CPTII,NTX,, | Performed by: STUDENT IN AN ORGANIZED HEALTH CARE EDUCATION/TRAINING PROGRAM

## 2023-02-24 PROCEDURE — 1111F PR DISCHARGE MEDS RECONCILED W/ CURRENT OUTPATIENT MED LIST: ICD-10-PCS | Mod: CPTII,TXP,, | Performed by: NURSE PRACTITIONER

## 2023-02-24 PROCEDURE — 99215 OFFICE O/P EST HI 40 MIN: CPT | Mod: PBBFAC,27,NTX | Performed by: NURSE PRACTITIONER

## 2023-02-24 PROCEDURE — 99204 PR OFFICE/OUTPT VISIT, NEW, LEVL IV, 45-59 MIN: ICD-10-PCS | Mod: S$PBB,NTX,, | Performed by: STUDENT IN AN ORGANIZED HEALTH CARE EDUCATION/TRAINING PROGRAM

## 2023-02-24 PROCEDURE — 3008F PR BODY MASS INDEX (BMI) DOCUMENTED: ICD-10-PCS | Mod: CPTII,NTX,, | Performed by: STUDENT IN AN ORGANIZED HEALTH CARE EDUCATION/TRAINING PROGRAM

## 2023-02-24 PROCEDURE — 4010F PR ACE/ARB THEARPY RXD/TAKEN: ICD-10-PCS | Mod: CPTII,TXP,, | Performed by: NURSE PRACTITIONER

## 2023-02-24 PROCEDURE — 1159F PR MEDICATION LIST DOCUMENTED IN MEDICAL RECORD: ICD-10-PCS | Mod: CPTII,NTX,, | Performed by: STUDENT IN AN ORGANIZED HEALTH CARE EDUCATION/TRAINING PROGRAM

## 2023-02-24 PROCEDURE — 3008F BODY MASS INDEX DOCD: CPT | Mod: CPTII,TXP,, | Performed by: NURSE PRACTITIONER

## 2023-02-24 PROCEDURE — 99999 PR PBB SHADOW E&M-EST. PATIENT-LVL III: CPT | Mod: PBBFAC,TXP,, | Performed by: STUDENT IN AN ORGANIZED HEALTH CARE EDUCATION/TRAINING PROGRAM

## 2023-02-24 PROCEDURE — 3079F PR MOST RECENT DIASTOLIC BLOOD PRESSURE 80-89 MM HG: ICD-10-PCS | Mod: CPTII,NTX,, | Performed by: STUDENT IN AN ORGANIZED HEALTH CARE EDUCATION/TRAINING PROGRAM

## 2023-02-24 PROCEDURE — 99213 OFFICE O/P EST LOW 20 MIN: CPT | Mod: PBBFAC,NTX | Performed by: STUDENT IN AN ORGANIZED HEALTH CARE EDUCATION/TRAINING PROGRAM

## 2023-02-24 PROCEDURE — 3044F HG A1C LEVEL LT 7.0%: CPT | Mod: CPTII,NTX,, | Performed by: STUDENT IN AN ORGANIZED HEALTH CARE EDUCATION/TRAINING PROGRAM

## 2023-02-24 PROCEDURE — 4010F ACE/ARB THERAPY RXD/TAKEN: CPT | Mod: CPTII,TXP,, | Performed by: NURSE PRACTITIONER

## 2023-02-24 PROCEDURE — 1160F RVW MEDS BY RX/DR IN RCRD: CPT | Mod: CPTII,NTX,, | Performed by: STUDENT IN AN ORGANIZED HEALTH CARE EDUCATION/TRAINING PROGRAM

## 2023-02-24 PROCEDURE — 1111F DSCHRG MED/CURRENT MED MERGE: CPT | Mod: CPTII,NTX,, | Performed by: STUDENT IN AN ORGANIZED HEALTH CARE EDUCATION/TRAINING PROGRAM

## 2023-02-24 PROCEDURE — 99214 OFFICE O/P EST MOD 30 MIN: CPT | Mod: S$PBB,NTX,, | Performed by: NURSE PRACTITIONER

## 2023-02-24 PROCEDURE — 3044F PR MOST RECENT HEMOGLOBIN A1C LEVEL <7.0%: ICD-10-PCS | Mod: CPTII,TXP,, | Performed by: NURSE PRACTITIONER

## 2023-02-24 PROCEDURE — 1111F DSCHRG MED/CURRENT MED MERGE: CPT | Mod: CPTII,TXP,, | Performed by: NURSE PRACTITIONER

## 2023-02-24 PROCEDURE — 1111F PR DISCHARGE MEDS RECONCILED W/ CURRENT OUTPATIENT MED LIST: ICD-10-PCS | Mod: CPTII,NTX,, | Performed by: STUDENT IN AN ORGANIZED HEALTH CARE EDUCATION/TRAINING PROGRAM

## 2023-02-24 PROCEDURE — 3074F PR MOST RECENT SYSTOLIC BLOOD PRESSURE < 130 MM HG: ICD-10-PCS | Mod: CPTII,NTX,, | Performed by: STUDENT IN AN ORGANIZED HEALTH CARE EDUCATION/TRAINING PROGRAM

## 2023-02-24 PROCEDURE — 3079F DIAST BP 80-89 MM HG: CPT | Mod: CPTII,NTX,, | Performed by: STUDENT IN AN ORGANIZED HEALTH CARE EDUCATION/TRAINING PROGRAM

## 2023-02-24 PROCEDURE — 99999 PR PBB SHADOW E&M-EST. PATIENT-LVL V: ICD-10-PCS | Mod: PBBFAC,TXP,, | Performed by: NURSE PRACTITIONER

## 2023-02-24 PROCEDURE — 3079F DIAST BP 80-89 MM HG: CPT | Mod: CPTII,TXP,, | Performed by: NURSE PRACTITIONER

## 2023-02-24 PROCEDURE — 3079F PR MOST RECENT DIASTOLIC BLOOD PRESSURE 80-89 MM HG: ICD-10-PCS | Mod: CPTII,TXP,, | Performed by: NURSE PRACTITIONER

## 2023-02-24 PROCEDURE — 4010F ACE/ARB THERAPY RXD/TAKEN: CPT | Mod: CPTII,NTX,, | Performed by: STUDENT IN AN ORGANIZED HEALTH CARE EDUCATION/TRAINING PROGRAM

## 2023-02-24 PROCEDURE — 99999 PR PBB SHADOW E&M-EST. PATIENT-LVL V: CPT | Mod: PBBFAC,TXP,, | Performed by: NURSE PRACTITIONER

## 2023-02-24 PROCEDURE — 99214 PR OFFICE/OUTPT VISIT, EST, LEVL IV, 30-39 MIN: ICD-10-PCS | Mod: S$PBB,NTX,, | Performed by: NURSE PRACTITIONER

## 2023-02-24 PROCEDURE — 99999 PR PBB SHADOW E&M-EST. PATIENT-LVL III: ICD-10-PCS | Mod: PBBFAC,TXP,, | Performed by: STUDENT IN AN ORGANIZED HEALTH CARE EDUCATION/TRAINING PROGRAM

## 2023-02-24 PROCEDURE — 3044F PR MOST RECENT HEMOGLOBIN A1C LEVEL <7.0%: ICD-10-PCS | Mod: CPTII,NTX,, | Performed by: STUDENT IN AN ORGANIZED HEALTH CARE EDUCATION/TRAINING PROGRAM

## 2023-02-24 PROCEDURE — 3074F PR MOST RECENT SYSTOLIC BLOOD PRESSURE < 130 MM HG: ICD-10-PCS | Mod: CPTII,TXP,, | Performed by: NURSE PRACTITIONER

## 2023-02-24 PROCEDURE — 3044F HG A1C LEVEL LT 7.0%: CPT | Mod: CPTII,TXP,, | Performed by: NURSE PRACTITIONER

## 2023-02-24 PROCEDURE — 99204 OFFICE O/P NEW MOD 45 MIN: CPT | Mod: S$PBB,NTX,, | Performed by: STUDENT IN AN ORGANIZED HEALTH CARE EDUCATION/TRAINING PROGRAM

## 2023-02-24 PROCEDURE — 3074F SYST BP LT 130 MM HG: CPT | Mod: CPTII,TXP,, | Performed by: NURSE PRACTITIONER

## 2023-02-24 PROCEDURE — 4010F PR ACE/ARB THEARPY RXD/TAKEN: ICD-10-PCS | Mod: CPTII,NTX,, | Performed by: STUDENT IN AN ORGANIZED HEALTH CARE EDUCATION/TRAINING PROGRAM

## 2023-02-24 PROCEDURE — 1159F MED LIST DOCD IN RCRD: CPT | Mod: CPTII,NTX,, | Performed by: STUDENT IN AN ORGANIZED HEALTH CARE EDUCATION/TRAINING PROGRAM

## 2023-02-24 NOTE — PATIENT INSTRUCTIONS
Stop glimepiride    Start Ozempic 0.25 mg weekly, increase to 0.5 mg weekly after 4 weeks if tolerated    Continue Jardiance daily    You have been prescribed a Dexcom glucose monitor. Orders have been sent to [employer] and they will be forwarded to a medical supplies company. They will reach out to you within 2 weeks once approved to coordinate shipment to your house.    If you do not hear from anyone within 2 weeks please let us know.

## 2023-02-24 NOTE — ASSESSMENT & PLAN NOTE
She has a 4 cm adrenal nodule  This has been followed by Neuroendocrine team in [place]  Adrenal has been stable at 4 cm  Surgical removal was considered but then decided not to based on poor cardiac function  I will review records in detail and see if she needs hormonal work up  She has a recent CT from Feb 2023 indicating stability in size

## 2023-02-24 NOTE — TELEPHONE ENCOUNTER
Called to confirm pt's appt with Dr. Chacko on 2/27; no answer.  Left VM requesting call back in the event pt is unable to present to the appt.

## 2023-02-24 NOTE — PROGRESS NOTES
Cardiology Clinic note    Subjective:   Patient ID:  Hafsa Hawley is a 57 y.o. female who presents for hospital follow-up of ADHF, HFrEF, NICM, A-Fib on anticoagulation, HLD, HTN, ICD    HPI:   Hafsa Hawley  has a past medical history of Anemia, Arthritis, Atrial fibrillation, Chronic respiratory failure with hypoxia, on home oxygen therapy, CKD (chronic kidney disease), stage IV (05/08/2018), Congestive heart failure, Deep vein thrombosis, Depression, elevated bilirubin d/t Gilbert's syndrome, Encounter for blood transfusion, GERD (gastroesophageal reflux disease), Hypertension, Pheochromocytoma, malignant, Right kidney mass, Sleep apnea, Thalassemia trait, alpha, Thyroid disease, and Type 2 diabetes mellitus with hyperglycemia, without long-term current use of insulin (08/13/2020).    [facility] staty 2/2023  Presented to the ED with blood pressure 150/100 mm Hg and sinus tachycardia.  She has advanced heart failure with EF 20%.  She has frequent hospitalizations for heart failure.  She has a defibrillator.  It has not fired in the past.  She has paroxysmal AFib and takes Eliquis.  In the ED leg swelling and jugular venous distention are documented.  She has diuresed and is breathing better.  She remains on oxygen.  She had low sats in the 80s on presentation.  Telemetry has shown short runs of ventricular tachycardia overnight.  She has diuresed well.  She has been on furosemide 80 mg 2 times daily.     She recently saw the transplant team.  She was converted to Bumex.  She takes Entresto and Toprol.  She had not been checking her blood pressures at home.  She has sleep apnea but no machine.  She has been compliant with medications including Entresto and Toprol.  She has chest pain at times.  There is documentation of nonischemic cardiomyopathy.  She did have troponins as elevated as 0.6 ng/ml.  Her BNP was greater than 4000 on admission.     Reviewing the transplant consultation evaluation there  are issues surrounding persistent pulmonary dysfunction.  There is close follow-up scheduled in the near future.  She has chronic [place] heart Association functional capacity 3 status.  She states she had been doing well until the day of admission.  She also had nausea vomiting as well as shortness of breath.       She presents for follow-up and denies any worsening shortness of breath, chest pain, dyspnea on exertion. Uses portable O2 as needed, resting sats 91%.  She reports compliance with diuretic therapy and all cardiac meds.  She does not weigh herself daily, due to broken scale. She reports she will replace today.     She was recently seen by cardiothoracic surgery and the heart failure team.  She was declined for OHT or LVAD due to renal function, lung function and difficulty consistently following up with team but this is being re-evaluated.  She has had multiple heart failure exacerbations.    Of note per Dr. Guardado she was seen by Pulm in Jan 2023 and they felt that there was no overt evidence of ILD or COPD on PFTs and CT chest.  They felt that the etiology of her chronic hypoxic and hypercapnic respiratory failure was not clear.    Patient Active Problem List    Diagnosis Date Noted    Aortic atherosclerosis 02/22/2023    Pulmonary hypertension 02/22/2023    Class 1 obesity due to excess calories without serious comorbidity in adult 02/22/2023    Pulmonary HTN 01/06/2023    Demand ischemia 01/04/2023    Prolonged Q-T interval on ECG 11/16/2022    NSVT (nonsustained ventricular tachycardia) 11/02/2022    Stage 3b chronic kidney disease 08/23/2022    Restrictive lung disease 04/26/2022    Hypertensive cardiovascular-renal disease, stage 1-4 or unspecified chronic kidney disease, with heart failure 03/04/2022    Elevated serum immunoglobulin free light chains 02/23/2022    Muscle twitch 11/02/2021    DVT prophylaxis 05/17/2021    Shortness of breath 05/16/2021    Chronic respiratory failure with hypoxia  and hypercapnia 03/16/2021    Type 2 diabetes mellitus with stage 4 chronic kidney disease, with long-term current use of insulin 08/13/2020    Leukopenia 08/13/2020    Screen for colon cancer 03/29/2019    elevated bilirubin d/t Gilbert's syndrome      confirmed by [facility] genetic testing, evaluated by hepatology      Paroxysmal supraventricular tachycardia 08/20/2018    ICD (implantable cardioverter-defibrillator) in place 07/24/2018    CKD (chronic kidney disease), stage IV 05/08/2018    Depression due to physical illness 05/08/2018    Left ventricular hypertrophy     Acute on chronic combined systolic and diastolic heart failure 03/16/2018    Lumbar degenerative disc disease 06/07/2017    Adrenal mass 1 cm to 4 cm in diameter 04/19/2017    NICM (nonischemic cardiomyopathy) 10/27/2016    Paroxysmal atrial fibrillation 08/25/2016    MELISSA (obstructive sleep apnea) 08/23/2016    Essential hypertension 07/22/2016    Elevated troponin 07/20/2016    Microcytic anemia 07/20/2016       Patient's Medications   New Prescriptions    No medications on file   Previous Medications    ALBUTEROL-IPRATROPIUM (DUO-NEB) 2.5 MG-0.5 MG/3 ML NEBULIZER SOLUTION    Take 3 mLs by nebulization 2 (two) times a day.    ALLOPURINOL (ZYLOPRIM) 100 MG TABLET    Take 1 tablet (100 mg total) by mouth daily as needed (gout attack).    ALPRAZOLAM (XANAX) 2 MG TAB    Take 0.25-2 mg by mouth 2 (two) times daily as needed (anxiety).    APIXABAN (ELIQUIS) 5 MG TAB    Take 1 tablet (5 mg total) by mouth 2 (two) times daily.    ATORVASTATIN (LIPITOR) 40 MG TABLET    Take 1 tablet (40 mg total) by mouth every evening.    B COMPLEX VITAMINS TABLET    Take 1 tablet by mouth once daily.    BLOOD SUGAR DIAGNOSTIC (ACCU-CHEK GUIDE TEST STRIPS) STRP    1 strip by Other route 3 (three) times daily.    BUMETANIDE (BUMEX) 2 MG TABLET    Take 2 tablets (4 mg total) by mouth 2 (two) times a day.    CETIRIZINE (ZYRTEC) 10 MG TABLET    Take 10 mg by mouth daily as needed  for Allergies.    DICLOFENAC SODIUM (VOLTAREN) 1 % GEL    APPLY 2 G TOPICALLY 3 (THREE) TIMES DAILY AS NEEDED (PAIN).    EMPAGLIFLOZIN (JARDIANCE) 10 MG TABLET    Take 1 tablet (10 mg total) by mouth once daily.    ERGOCALCIFEROL (ERGOCALCIFEROL) 50,000 UNIT CAP    Take 1 capsule (50,000 Units total) by mouth every 7 days.    FERROUS SULFATE 325 (65 FE) MG EC TABLET    Take 1 tablet (325 mg total) by mouth once daily.    FLUTICASONE PROPIONATE (FLONASE) 50 MCG/ACTUATION NASAL SPRAY    2 sprays by Each Nostril route daily as needed for Rhinitis.     ISOSORBIDE DINITRATE (ISORDIL) 30 MG TAB    Take 1 tablet (30 mg total) by mouth 3 (three) times daily.    LANCETS (ACCU-CHEK SOFTCLIX LANCETS) MISC    1 Device by Misc.(Non-Drug; Combo Route) route 3 (three) times daily.    LIDOCAINE (LIDODERM) 5 %    Place 1 patch onto the skin daily as needed (pain). Remove & Discard patch within 12 hours or as directed by MD    METOPROLOL SUCCINATE (TOPROL-XL) 50 MG 24 HR TABLET    Take 1 tablet (50 mg total) by mouth once daily.    MOMETASONE-FORMOTEROL (DULERA) 200-5 MCG/ACTUATION INHALER    Inhale 2 puffs into the lungs 2 (two) times daily. Controller    NITROSTAT 0.4 MG SL TABLET    Take 2.5 tablets (1 mg total) by mouth every 5 (five) minutes as needed for Chest pain. No more than 3 tablets in 15 minutes.    ONDANSETRON (ZOFRAN-ODT) 4 MG TBDL    Take 1 tablet (4 mg total) by mouth every 12 (twelve) hours as needed (nausea).    PANTOPRAZOLE (PROTONIX) 40 MG TABLET    TAKE 1 TABLET BY MOUTH DAILY IN THE MORNING    POTASSIUM CHLORIDE (MICRO-K) 10 MEQ CPSR    Take 1 capsule (10 mEq total) by mouth once daily.    PREGABALIN (LYRICA) 25 MG CAPSULE    Take 1 capsule (25 mg total) by mouth 2 (two) times daily.    ROPINIROLE (REQUIP) 4 MG TABLET    Take 1 tablet (4 mg total) by mouth once daily. Pt taking 4mg daily    SACUBITRIL-VALSARTAN (ENTRESTO) 49-51 MG PER TABLET    Take 1 tablet by mouth 2 (two) times daily.    SCOPOLAMINE  "(TRANSDERM-SCOP) 1.3-1.5 MG (1 MG OVER 3 DAYS)    Place 1 patch onto the skin every 72 hours as needed (nausea).    SEMAGLUTIDE (OZEMPIC) 0.25 MG OR 0.5 MG(2 MG/1.5 ML) PEN INJECTOR    Take 0.25 mg for 4 weeks, then increase to 0.5 mg weekly    TIOTROPIUM BROMIDE (SPIRIVA RESPIMAT) 1.25 MCG/ACTUATION INHALER    Inhale 2 puffs into the lungs once daily. Controller    VENTOLIN HFA 90 MCG/ACTUATION INHALER    Inhale 2 puffs into the lungs every 6 (six) hours as needed for Shortness of Breath or Wheezing.   Modified Medications    No medications on file   Discontinued Medications    No medications on file        Review of Systems   Cardiovascular:  Positive for dyspnea on exertion. Negative for chest pain, cyanosis, irregular heartbeat, leg swelling, near-syncope, orthopnea, palpitations, paroxysmal nocturnal dyspnea and syncope.   Respiratory:  Positive for shortness of breath.    Skin: Negative.    Psychiatric/Behavioral:  Positive for depression.        Objective:   Vitals  Vitals:    02/24/23 1033   BP: 126/82   Pulse: 76   Resp: 16   SpO2: (!) 91%   Weight: 90.7 kg (199 lb 15.3 oz)   Height: 5' 6" (1.676 m)          Physical Exam  Constitutional:       Appearance: She is obese.   HENT:      Head: Normocephalic.   Cardiovascular:      Rate and Rhythm: Normal rate and regular rhythm.      Heart sounds: Murmur heard.   Pulmonary:      Effort: Pulmonary effort is normal.      Breath sounds: Normal breath sounds.   Musculoskeletal:         General: Normal range of motion.   Neurological:      Mental Status: She is alert and oriented to person, place, and time.   Psychiatric:      Comments: Tearful during visit          Lab Results    Lab Results   Component Value Date    WBC 4.48 02/21/2023    HGB 8.0 (L) 02/21/2023    HCT 27.4 (L) 02/21/2023    HCT 45 12/07/2021    MCV 58 (L) 02/21/2023       Lab Results   Component Value Date     02/21/2023    INR 1.0 01/04/2023       Lab Results   Component Value Date    K 4.0 " 02/22/2023    MG 1.9 02/21/2023    BUN 46 (H) 02/22/2023    CREATININE 2.3 (H) 02/22/2023       Lab Results   Component Value Date    GLU 67 (L) 02/22/2023    HGBA1C 4.6 01/30/2023       Lab Results   Component Value Date    AST 23 02/21/2023    ALT 35 02/21/2023    ALBUMIN 3.6 02/21/2023    PROT 6.3 02/21/2023       Lab Results   Component Value Date    CHOL 136 01/04/2023    HDL 63 01/04/2023    LDLCALC 57.8 (L) 01/04/2023    TRIG 76 01/04/2023       Lab Results   Component Value Date    CRP 0.5 01/04/2023    BNP 4,274 (H) 02/20/2023       Cardiac Studies  Significant Imaging: Echocardiogram: Transthoracic echo (TTE) complete (Cupid Only):   Results for orders placed or performed during the hospital encounter of 11/15/22   Echo   Result Value Ref Range    Ascending aorta 3.56 cm    STJ 2.90 cm    AV mean gradient 6 mmHg    Ao peak marcos 1.56 m/s    Ao VTI 22.91 cm    IVS 1.29 (A) 0.6 - 1.1 cm    LA size 5.86 cm    Left Atrium Major Axis 7.85 cm    Left Atrium Minor Axis 8.08 cm    LVIDd 7.27 (A) 3.5 - 6.0 cm    LVIDs 6.70 (A) 2.1 - 4.0 cm    LVOT diameter 2.07 cm    LVOT peak VTI 17.39 cm    Posterior Wall 1.25 (A) 0.6 - 1.1 cm    MV Peak A Marcos 1.00 m/s    E wave deceleration time 194.33 msec    MV Peak E Marcos 0.98 m/s    RA Major Axis 6.63 cm    RA Width 2.91 cm    RVDD 3.84 cm    Sinus 3.07 cm    TAPSE 1.75 cm    TR Max Marcos 3.55 m/s    TDI LATERAL 0.05 m/s    TDI SEPTAL 0.05 m/s    LA WIDTH 5.09 cm    MV stenosis pressure 1/2 time 56.36 ms    LV Diastolic Volume 278.29 mL    LV Systolic Volume 231.43 mL    LVOT peak marcos 1.12 m/s    Mr max marcos 0.05 m/s    LA volume (mod) 168.66 cm3    LV LATERAL E/E' RATIO 19.60 m/s    LV SEPTAL E/E' RATIO 19.60 m/s    FS 8 %    LA volume 201.90 cm3    LV mass 466.38 g    Left Ventricle Relative Wall Thickness 0.34 cm    AV valve area 2.55 cm2    AV Velocity Ratio 0.72     AV index (prosthetic) 0.76     MV valve area p 1/2 method 3.90 cm2    E/A ratio 0.98     Mean e' 0.05 m/s     LVOT area 3.4 cm2    LVOT stroke volume 58.49 cm3    AV peak gradient 10 mmHg    E/E' ratio 19.60 m/s    LV Systolic Volume Index 110.7 mL/m2    LV Diastolic Volume Index 133.15 mL/m2    LA Volume Index 96.6 mL/m2    LV Mass Index 223 g/m2    Triscuspid Valve Regurgitation Peak Gradient 50 mmHg    LA Volume Index (Mod) 80.7 mL/m2    BSA 2.16 m2    Right Atrial Pressure (from IVC) 15 mmHg    EF 10 %    TV rest pulmonary artery pressure 65 mmHg    Narrative    · The left ventricle is severely enlarged with eccentric hypertrophy and   severely decreased systolic function. The estimated ejection fraction is   10%.  · The right ventricle is not well visualized but appears normal in size   with moderately reduced systolic function.  · Grade II left ventricular diastolic dysfunction.  · Biatrial enlargement.  · The estimated PA systolic pressure is 65 mmHg.  · Elevated central venous pressure (15 mmHg).  · Small posterolateral pericardial effusion.          Assessment:     Problem List Items Addressed This Visit          Cardiac/Vascular    Acute on chronic combined systolic and diastolic heart failure - Primary (Chronic)       Plan:     BMP, BNP today   Euvolemic on exam  O2 sats at baseline  Re-consult pulmonology given chronic hypoxic and hypercapnic respiratory failure; per patient's request would like to change     Continue with current medication regimen; discussed daily weights at home     Follow up in 1 month    Orders Placed This Encounter   Procedures    B-TYPE NATRIURETIC PEPTIDE           Standing Status:   Future     Standing Expiration Date:   4/24/2024    Basic metabolic panel           Standing Status:   Future     Standing Expiration Date:   4/24/2024    Ambulatory referral/consult to Pulmonology     Standing Status:   Future     Standing Expiration Date:   3/24/2024     Referral Priority:   Routine     Referral Type:   Consultation     Referral Reason:   Specialty Services Required     Requested  Specialty:   Pulmonary Disease     Number of Visits Requested:   1       Return sooner for concerns or questions. If symptoms persist go to the ED    She expressed verbal understanding and agreed with the plan    Thank you for the opportunity to care for this patient. Will be available for questions if needed.     Total duration of face to face visit time 30 minutes.  Total time spent counseling greater than fifty percent of total visit time.  Counseling included discussion regarding imaging findings, diagnosis, possibilities, treatment options, risks and benefits.    Tatiana Fortune, BUFFYP-C  Cardiology Clinic  [facility]

## 2023-02-24 NOTE — PROGRESS NOTES
C3 nurse attempted to contact Hafsa Hawley for a TCC post hospital discharge follow up call. No answer. Left voicemail with callback information. The patient has a scheduled HOSFU appointment with Cristobal Ann MD on 03/03/2023 @ 1300.

## 2023-02-24 NOTE — PROGRESS NOTES
Subjective:      Patient ID: Hafsa Hawley is a 57 y.o. female.    Chief Complaint:  Type 2 diabetes mellitus    History of Present Illness  This is a 57 y.o. female. with a past medical history of Type 2 diabetes mellitus, HFrEF here for evaluation.    Type 2 diabetes mellitus    Current diabetes medications:  - Jardiance 10 mg daily  - Glimepiride 1 mg daily (takes half a pill of a 2 mg)  - Novolog SSI 4 times daily    INSULIN CORRECTION SCALE    Glucose                 Insulin           181-200               +2 units                     201-250               +4 units                      251-300               +6 units                     301-350               +8 units                      >350                    +10 units                       Past diabetes medications:  - Metformin - CKD  - Levemir    Lab Results   Component Value Date    CREATININE 2.3 (H) 02/22/2023    EGFRNORACEVR 24 (A) 02/22/2023       Known diabetic complications: nephropathy and cardiovascular disease    Weight trend:  Wt Readings from Last 8 Encounters:   02/24/23 90.7 kg (199 lb 15.3 oz)   02/24/23 90 kg (198 lb 8 oz)   02/20/23 93.8 kg (206 lb 12.7 oz)   02/16/23 92.2 kg (203 lb 4.2 oz)   02/16/23 92.2 kg (203 lb 4.2 oz)   02/10/23 90.5 kg (199 lb 10 oz)   02/03/23 89.1 kg (196 lb 8.6 oz)   01/20/23 90.9 kg (200 lb 4.6 oz)       Family history of diabetes:  Yes    Prior visit with diabetes education: yes    Current diet: 3 meals per day    Blood glucose monitoring at home:   Any episodes of hypoglycemia? Yes    Diabetes Management Status  Statin: Taking  ACE/ARB: Not taking    Screening or Prevention Patient's value   HgA1C Testing and Control   Lab Results   Component Value Date    HGBA1C 4.6 01/30/2023        LDL control Lab Results   Component Value Date    LDLCALC 57.8 (L) 01/04/2023      Nephropathy screening No results found for: MICALBCREAT     Last eye exam: : 04/27/2022          R adrenal mass  She has a 4 cm adrenal  "nodule  This has been followed by Neuroendocrine team in [place]  Adrenal has been stable at 4 cm  Surgical removal was considered but then decided not to based on poor cardiac function  I will review records in detail and see if she needs hormonal work up  She has a recent CT from Feb 2023 indicating stability in size      CT A/P wo contrast (Feb 2023)  Stomach, spleen, pancreas and left adrenal glands show no significant abnormalities on noncontrast imaging.  There is a 4 cm right adrenal nodule stable compared to multiple prior exams.    Review of Systems  As above    Social and family history reviewed  Current medications and allergies reviewed    Objective:   /80 (BP Location: Right arm, Patient Position: Sitting, BP Method: Medium (Manual))   Pulse 85   Resp 16   Ht 5' 6" (1.676 m)   Wt 90 kg (198 lb 8 oz)   LMP 10/23/2001   BMI 32.04 kg/m²   Physical Exam  Alert, oriented    BP Readings from Last 1 Encounters:   02/24/23 126/82      Wt Readings from Last 1 Encounters:   02/24/23 1033 90.7 kg (199 lb 15.3 oz)     Body mass index is 32.04 kg/m².    Lab Review:   Lab Results   Component Value Date    HGBA1C 4.6 01/30/2023     Lab Results   Component Value Date    CHOL 136 01/04/2023    HDL 63 01/04/2023    LDLCALC 57.8 (L) 01/04/2023    TRIG 76 01/04/2023    CHOLHDL 46.3 01/04/2023     Lab Results   Component Value Date     02/22/2023    K 4.0 02/22/2023     02/22/2023    CO2 24 02/22/2023    GLU 67 (L) 02/22/2023    BUN 46 (H) 02/22/2023    CREATININE 2.3 (H) 02/22/2023    CALCIUM 8.8 02/22/2023    PROT 6.3 02/21/2023    ALBUMIN 3.6 02/21/2023    BILITOT 1.6 (H) 02/21/2023    ALKPHOS 54 (L) 02/21/2023    AST 23 02/21/2023    ALT 35 02/21/2023    ANIONGAP 11 02/22/2023    ESTGFRAFRICA 29.7 (A) 04/26/2022    EGFRNONAA 25.7 (A) 04/26/2022    TSH 1.407 01/04/2023       All pertinent labs reviewed    Assessment and Plan     Type 2 diabetes mellitus with stage 4 chronic kidney disease, with " long-term current use of insulin  Uncontrolled based on A1c and reported POCT glucose with significant glycemic variability.    Patient is on an intensive insulin regimen with 4 daily injections and is testing glucose 4+ times daily. Patient has demonstrated an understanding of technology and is motivated to use the device correctly. Patient is expected to adhere to a diabetes treatment plan and is capable of recognizing alerts and alarms. Patient has recurrent, severe (BG <54) hypoglycemia and impaired awareness of hypoglycemia.    Patient's insulin regimen requires frequent adjustments based on BG results. Patient will receive an in-person visit every 6 months to assess adherence to CGM regimen and diabetes treatment.    Stop SFU given hypoglycemia. Sart GLP-1 RA.    Plan  - Stop glimepiride  - Start Ozempic 0.25 mg weekly, increase to 0.5 mg weekly after 4 weeks if tolerated  - Continue Jardiance 10 mg daily  - Continue Novolog SSI 4 times a day  - Start Dexcom G7 CGM    F/u 6 months            Class 1 obesity due to excess calories without serious comorbidity in adult  Start GLP-1 RA    Adrenal mass 1 cm to 4 cm in diameter  She has a 4 cm adrenal nodule  This has been followed by Neuroendocrine team in [place]  Adrenal has been stable at 4 cm  Surgical removal was considered but then decided not to based on poor cardiac function  I will review records in detail and see if she needs hormonal work up  She has a recent CT from Feb 2023 indicating stability in size      Follow-up in 6 months    Uziel Herman MD  Endocrinology

## 2023-02-24 NOTE — ASSESSMENT & PLAN NOTE
Uncontrolled based on A1c and reported POCT glucose with significant glycemic variability.    Patient is on an intensive insulin regimen with 4 daily injections and is testing glucose 4+ times daily. Patient has demonstrated an understanding of technology and is motivated to use the device correctly. Patient is expected to adhere to a diabetes treatment plan and is capable of recognizing alerts and alarms. Patient has recurrent, severe (BG <54) hypoglycemia and impaired awareness of hypoglycemia.    Patient's insulin regimen requires frequent adjustments based on BG results. Patient will receive an in-person visit every 6 months to assess adherence to CGM regimen and diabetes treatment.    Stop SFU given hypoglycemia. Sart GLP-1 RA.    Plan  - Stop glimepiride  - Start Ozempic 0.25 mg weekly, increase to 0.5 mg weekly after 4 weeks if tolerated  - Continue Jardiance 10 mg daily  - Continue Novolog SSI 4 times a day  - Start Dexcom G7 CGM    F/u 6 months

## 2023-02-27 ENCOUNTER — OFFICE VISIT (OUTPATIENT)
Dept: CARDIOTHORACIC SURGERY | Facility: CLINIC | Age: 58
End: 2023-02-27
Payer: MEDICAID

## 2023-02-27 ENCOUNTER — TELEPHONE (OUTPATIENT)
Dept: ENDOCRINOLOGY | Facility: CLINIC | Age: 58
End: 2023-02-27
Payer: MEDICAID

## 2023-02-27 ENCOUNTER — TELEPHONE (OUTPATIENT)
Dept: TRANSPLANT | Facility: CLINIC | Age: 58
End: 2023-02-27
Payer: MEDICAID

## 2023-02-27 ENCOUNTER — TELEPHONE (OUTPATIENT)
Dept: FAMILY MEDICINE | Facility: CLINIC | Age: 58
End: 2023-02-27
Payer: MEDICAID

## 2023-02-27 ENCOUNTER — OFFICE VISIT (OUTPATIENT)
Dept: PALLIATIVE MEDICINE | Facility: CLINIC | Age: 58
End: 2023-02-27
Payer: MEDICAID

## 2023-02-27 ENCOUNTER — HOSPITAL ENCOUNTER (OUTPATIENT)
Dept: PULMONOLOGY | Facility: CLINIC | Age: 58
Discharge: HOME | End: 2023-02-27
Attending: INTERNAL MEDICINE
Payer: MEDICAID

## 2023-02-27 ENCOUNTER — SOCIAL WORK (OUTPATIENT)
Dept: TRANSPLANT | Facility: CLINIC | Age: 58
End: 2023-02-27
Payer: MEDICAID

## 2023-02-27 VITALS
HEART RATE: 76 BPM | SYSTOLIC BLOOD PRESSURE: 131 MMHG | BODY MASS INDEX: 31.81 KG/M2 | DIASTOLIC BLOOD PRESSURE: 74 MMHG | WEIGHT: 197.06 LBS | OXYGEN SATURATION: 94 %

## 2023-02-27 VITALS
WEIGHT: 197 LBS | DIASTOLIC BLOOD PRESSURE: 81 MMHG | HEART RATE: 74 BPM | HEIGHT: 66 IN | BODY MASS INDEX: 31.66 KG/M2 | SYSTOLIC BLOOD PRESSURE: 139 MMHG | OXYGEN SATURATION: 95 %

## 2023-02-27 DIAGNOSIS — I50.9 CONGESTIVE HEART FAILURE, UNSPECIFIED HF CHRONICITY, UNSPECIFIED HEART FAILURE TYPE: ICD-10-CM

## 2023-02-27 DIAGNOSIS — I50.9 ACUTE DECOMPENSATED HEART FAILURE: ICD-10-CM

## 2023-02-27 DIAGNOSIS — Z51.5 PALLIATIVE CARE ENCOUNTER: ICD-10-CM

## 2023-02-27 DIAGNOSIS — R11.0 NAUSEA: Primary | ICD-10-CM

## 2023-02-27 DIAGNOSIS — Z01.818 PRE-TRANSPLANT EVALUATION FOR HEART TRANSPLANT: Primary | ICD-10-CM

## 2023-02-27 DIAGNOSIS — I27.20 PULMONARY HTN: Chronic | ICD-10-CM

## 2023-02-27 DIAGNOSIS — Z01.818 PRE-TRANSPLANT EVALUATION FOR HEART TRANSPLANT: ICD-10-CM

## 2023-02-27 DIAGNOSIS — I42.8 NICM (NONISCHEMIC CARDIOMYOPATHY): Primary | Chronic | ICD-10-CM

## 2023-02-27 LAB
ALLENS TEST: ABNORMAL
DELSYS: ABNORMAL
FIO2: 0.21
HCO3 UR-SCNC: 27.9 MMOL/L (ref 24–28)
MODE: ABNORMAL
PCO2 BLDA: 39 MMHG (ref 35–45)
PH SMN: 7.46 [PH] (ref 7.35–7.45)
PO2 BLDA: 73 MMHG (ref 80–100)
POC BE: 4 MMOL/L
POC SATURATED O2: 95 % (ref 95–100)
POC TCO2: 29 MMOL/L (ref 23–27)
SAMPLE: ABNORMAL
SITE: ABNORMAL

## 2023-02-27 PROCEDURE — 1160F RVW MEDS BY RX/DR IN RCRD: CPT | Mod: CPTII,TXP,, | Performed by: THORACIC SURGERY (CARDIOTHORACIC VASCULAR SURGERY)

## 2023-02-27 PROCEDURE — 99205 OFFICE O/P NEW HI 60 MIN: CPT | Mod: S$PBB,TXP,, | Performed by: THORACIC SURGERY (CARDIOTHORACIC VASCULAR SURGERY)

## 2023-02-27 PROCEDURE — 3008F BODY MASS INDEX DOCD: CPT | Mod: CPTII,NTX,, | Performed by: STUDENT IN AN ORGANIZED HEALTH CARE EDUCATION/TRAINING PROGRAM

## 2023-02-27 PROCEDURE — 1159F MED LIST DOCD IN RCRD: CPT | Mod: CPTII,TXP,, | Performed by: THORACIC SURGERY (CARDIOTHORACIC VASCULAR SURGERY)

## 2023-02-27 PROCEDURE — 4010F PR ACE/ARB THEARPY RXD/TAKEN: ICD-10-PCS | Mod: CPTII,TXP,, | Performed by: THORACIC SURGERY (CARDIOTHORACIC VASCULAR SURGERY)

## 2023-02-27 PROCEDURE — 3044F HG A1C LEVEL LT 7.0%: CPT | Mod: CPTII,TXP,, | Performed by: THORACIC SURGERY (CARDIOTHORACIC VASCULAR SURGERY)

## 2023-02-27 PROCEDURE — 3044F PR MOST RECENT HEMOGLOBIN A1C LEVEL <7.0%: ICD-10-PCS | Mod: CPTII,NTX,, | Performed by: STUDENT IN AN ORGANIZED HEALTH CARE EDUCATION/TRAINING PROGRAM

## 2023-02-27 PROCEDURE — 99999 PR PBB SHADOW E&M-EST. PATIENT-LVL II: CPT | Mod: PBBFAC,TXP,, | Performed by: STUDENT IN AN ORGANIZED HEALTH CARE EDUCATION/TRAINING PROGRAM

## 2023-02-27 PROCEDURE — 1111F DSCHRG MED/CURRENT MED MERGE: CPT | Mod: CPTII,TXP,, | Performed by: THORACIC SURGERY (CARDIOTHORACIC VASCULAR SURGERY)

## 2023-02-27 PROCEDURE — 99212 OFFICE O/P EST SF 10 MIN: CPT | Mod: PBBFAC,NTX | Performed by: STUDENT IN AN ORGANIZED HEALTH CARE EDUCATION/TRAINING PROGRAM

## 2023-02-27 PROCEDURE — 4010F ACE/ARB THERAPY RXD/TAKEN: CPT | Mod: CPTII,TXP,, | Performed by: THORACIC SURGERY (CARDIOTHORACIC VASCULAR SURGERY)

## 2023-02-27 PROCEDURE — 99205 PR OFFICE/OUTPT VISIT, NEW, LEVL V, 60-74 MIN: ICD-10-PCS | Mod: S$PBB,TXP,, | Performed by: THORACIC SURGERY (CARDIOTHORACIC VASCULAR SURGERY)

## 2023-02-27 PROCEDURE — 99215 PR OFFICE/OUTPT VISIT, EST, LEVL V, 40-54 MIN: ICD-10-PCS | Mod: S$PBB,NTX,, | Performed by: STUDENT IN AN ORGANIZED HEALTH CARE EDUCATION/TRAINING PROGRAM

## 2023-02-27 PROCEDURE — 99212 OFFICE O/P EST SF 10 MIN: CPT | Mod: PBBFAC,27,TXP | Performed by: THORACIC SURGERY (CARDIOTHORACIC VASCULAR SURGERY)

## 2023-02-27 PROCEDURE — 36600 PR WITHDRAWAL OF ARTERIAL BLOOD: ICD-10-PCS | Mod: S$PBB,TXP,, | Performed by: INTERNAL MEDICINE

## 2023-02-27 PROCEDURE — 3044F HG A1C LEVEL LT 7.0%: CPT | Mod: CPTII,NTX,, | Performed by: STUDENT IN AN ORGANIZED HEALTH CARE EDUCATION/TRAINING PROGRAM

## 2023-02-27 PROCEDURE — 1111F PR DISCHARGE MEDS RECONCILED W/ CURRENT OUTPATIENT MED LIST: ICD-10-PCS | Mod: CPTII,NTX,, | Performed by: STUDENT IN AN ORGANIZED HEALTH CARE EDUCATION/TRAINING PROGRAM

## 2023-02-27 PROCEDURE — 1159F PR MEDICATION LIST DOCUMENTED IN MEDICAL RECORD: ICD-10-PCS | Mod: CPTII,TXP,, | Performed by: THORACIC SURGERY (CARDIOTHORACIC VASCULAR SURGERY)

## 2023-02-27 PROCEDURE — 3075F PR MOST RECENT SYSTOLIC BLOOD PRESS GE 130-139MM HG: ICD-10-PCS | Mod: CPTII,NTX,, | Performed by: STUDENT IN AN ORGANIZED HEALTH CARE EDUCATION/TRAINING PROGRAM

## 2023-02-27 PROCEDURE — 99497 PR ADVNCD CARE PLAN 30 MIN: ICD-10-PCS | Mod: S$PBB,NTX,, | Performed by: STUDENT IN AN ORGANIZED HEALTH CARE EDUCATION/TRAINING PROGRAM

## 2023-02-27 PROCEDURE — 99999 PR PBB SHADOW E&M-EST. PATIENT-LVL II: CPT | Mod: PBBFAC,TXP,, | Performed by: THORACIC SURGERY (CARDIOTHORACIC VASCULAR SURGERY)

## 2023-02-27 PROCEDURE — 1111F PR DISCHARGE MEDS RECONCILED W/ CURRENT OUTPATIENT MED LIST: ICD-10-PCS | Mod: CPTII,TXP,, | Performed by: THORACIC SURGERY (CARDIOTHORACIC VASCULAR SURGERY)

## 2023-02-27 PROCEDURE — 3079F PR MOST RECENT DIASTOLIC BLOOD PRESSURE 80-89 MM HG: ICD-10-PCS | Mod: CPTII,TXP,, | Performed by: THORACIC SURGERY (CARDIOTHORACIC VASCULAR SURGERY)

## 2023-02-27 PROCEDURE — 3008F BODY MASS INDEX DOCD: CPT | Mod: CPTII,TXP,, | Performed by: THORACIC SURGERY (CARDIOTHORACIC VASCULAR SURGERY)

## 2023-02-27 PROCEDURE — 4010F ACE/ARB THERAPY RXD/TAKEN: CPT | Mod: CPTII,NTX,, | Performed by: STUDENT IN AN ORGANIZED HEALTH CARE EDUCATION/TRAINING PROGRAM

## 2023-02-27 PROCEDURE — 3079F DIAST BP 80-89 MM HG: CPT | Mod: CPTII,TXP,, | Performed by: THORACIC SURGERY (CARDIOTHORACIC VASCULAR SURGERY)

## 2023-02-27 PROCEDURE — 99497 ADVNCD CARE PLAN 30 MIN: CPT | Mod: PBBFAC,NTX | Performed by: STUDENT IN AN ORGANIZED HEALTH CARE EDUCATION/TRAINING PROGRAM

## 2023-02-27 PROCEDURE — 99999 PR PBB SHADOW E&M-EST. PATIENT-LVL II: ICD-10-PCS | Mod: PBBFAC,TXP,, | Performed by: STUDENT IN AN ORGANIZED HEALTH CARE EDUCATION/TRAINING PROGRAM

## 2023-02-27 PROCEDURE — 3008F PR BODY MASS INDEX (BMI) DOCUMENTED: ICD-10-PCS | Mod: CPTII,NTX,, | Performed by: STUDENT IN AN ORGANIZED HEALTH CARE EDUCATION/TRAINING PROGRAM

## 2023-02-27 PROCEDURE — 3075F SYST BP GE 130 - 139MM HG: CPT | Mod: CPTII,TXP,, | Performed by: THORACIC SURGERY (CARDIOTHORACIC VASCULAR SURGERY)

## 2023-02-27 PROCEDURE — 3075F PR MOST RECENT SYSTOLIC BLOOD PRESS GE 130-139MM HG: ICD-10-PCS | Mod: CPTII,TXP,, | Performed by: THORACIC SURGERY (CARDIOTHORACIC VASCULAR SURGERY)

## 2023-02-27 PROCEDURE — 3075F SYST BP GE 130 - 139MM HG: CPT | Mod: CPTII,NTX,, | Performed by: STUDENT IN AN ORGANIZED HEALTH CARE EDUCATION/TRAINING PROGRAM

## 2023-02-27 PROCEDURE — 1160F PR REVIEW ALL MEDS BY PRESCRIBER/CLIN PHARMACIST DOCUMENTED: ICD-10-PCS | Mod: CPTII,TXP,, | Performed by: THORACIC SURGERY (CARDIOTHORACIC VASCULAR SURGERY)

## 2023-02-27 PROCEDURE — 36600 WITHDRAWAL OF ARTERIAL BLOOD: CPT | Mod: PBBFAC,NTX | Performed by: INTERNAL MEDICINE

## 2023-02-27 PROCEDURE — 1111F DSCHRG MED/CURRENT MED MERGE: CPT | Mod: CPTII,NTX,, | Performed by: STUDENT IN AN ORGANIZED HEALTH CARE EDUCATION/TRAINING PROGRAM

## 2023-02-27 PROCEDURE — 82803 BLOOD GASES ANY COMBINATION: CPT | Mod: PBBFAC,TXP | Performed by: INTERNAL MEDICINE

## 2023-02-27 PROCEDURE — 3044F PR MOST RECENT HEMOGLOBIN A1C LEVEL <7.0%: ICD-10-PCS | Mod: CPTII,TXP,, | Performed by: THORACIC SURGERY (CARDIOTHORACIC VASCULAR SURGERY)

## 2023-02-27 PROCEDURE — 99999 PR PBB SHADOW E&M-EST. PATIENT-LVL II: ICD-10-PCS | Mod: PBBFAC,TXP,, | Performed by: THORACIC SURGERY (CARDIOTHORACIC VASCULAR SURGERY)

## 2023-02-27 PROCEDURE — 3078F PR MOST RECENT DIASTOLIC BLOOD PRESSURE < 80 MM HG: ICD-10-PCS | Mod: CPTII,NTX,, | Performed by: STUDENT IN AN ORGANIZED HEALTH CARE EDUCATION/TRAINING PROGRAM

## 2023-02-27 PROCEDURE — 3078F DIAST BP <80 MM HG: CPT | Mod: CPTII,NTX,, | Performed by: STUDENT IN AN ORGANIZED HEALTH CARE EDUCATION/TRAINING PROGRAM

## 2023-02-27 PROCEDURE — 99215 OFFICE O/P EST HI 40 MIN: CPT | Mod: S$PBB,NTX,, | Performed by: STUDENT IN AN ORGANIZED HEALTH CARE EDUCATION/TRAINING PROGRAM

## 2023-02-27 PROCEDURE — 99497 ADVNCD CARE PLAN 30 MIN: CPT | Mod: S$PBB,NTX,, | Performed by: STUDENT IN AN ORGANIZED HEALTH CARE EDUCATION/TRAINING PROGRAM

## 2023-02-27 PROCEDURE — 3008F PR BODY MASS INDEX (BMI) DOCUMENTED: ICD-10-PCS | Mod: CPTII,TXP,, | Performed by: THORACIC SURGERY (CARDIOTHORACIC VASCULAR SURGERY)

## 2023-02-27 PROCEDURE — 4010F PR ACE/ARB THEARPY RXD/TAKEN: ICD-10-PCS | Mod: CPTII,NTX,, | Performed by: STUDENT IN AN ORGANIZED HEALTH CARE EDUCATION/TRAINING PROGRAM

## 2023-02-27 PROCEDURE — 36600 WITHDRAWAL OF ARTERIAL BLOOD: CPT | Mod: S$PBB,TXP,, | Performed by: INTERNAL MEDICINE

## 2023-02-27 NOTE — TELEPHONE ENCOUNTER
Dexcom order form emailed to Pablo Schmidtb on 02/27/23.     Supplier-[facility]        ----- Message from Uziel Herman MD sent at 2/24/2023 10:08 AM CST -----  Dexcom G7 orders to Pablo rob

## 2023-02-27 NOTE — TELEPHONE ENCOUNTER
----- Message from Jessica Reinoso sent at 2023 12:43 PM CST -----  Contact: [facility]  Hafsa Hawley  MRN: [id number]  : 1965  PCP: Cristobal Ann  Home Phone      [phone]  Work Phone      Not on file.  Mobile          [phone]  Home Phone      [phone]      MESSAGE:     [facility] in [place] called and wants to know if the pt is using sacubitriL-valsartan (ENTRESTO)  mg per tablet or was decreased to sacubitriL-valsartan (ENTRESTO) 49-51 mg per tablet.     They did not leave a call back number it was a message on the machine.

## 2023-02-27 NOTE — PLAN OF CARE
[place] - Med Surg (3rd Fl)  Discharge Final Note    Primary Care Provider: Cristobal Ann MD    Expected Discharge Date: 2/22/2023    Final Discharge Note (most recent)       Final Note - 02/27/23 0814          Final Note    Assessment Type Final Discharge Note     Anticipated Discharge Disposition Home or Self Care     Hospital Resources/Appts/Education Provided Appointments scheduled and added to AVS        Post-Acute Status    Post-Acute Authorization Other     Other Status No Post-Acute Service Needs     Discharge Delays None known at this time                     Important Message from Medicare             Contact Info       Dom Montero MD   Specialty: Cardiology, Interventional Cardiology    [address] [address]   Phone: [phone]       Next Steps: Go on 2/24/2023    Instructions: at 10:40AM    Cristobal Ann MD   Specialty: Family Medicine   Relationship: PCP - General    [address] [address]   Phone: [phone]       Next Steps: Go on 3/3/2023    Instructions: Hospital Follow-up at 1pm

## 2023-02-27 NOTE — TELEPHONE ENCOUNTER
When pt was discharged from the hospital she was sent home on entresto 49-51mg per tablet. Pharmacy would like to know if she should be on this one or the 97-103mg

## 2023-02-27 NOTE — TELEPHONE ENCOUNTER
Reviewed plan of care with Dr Guardado.  Pulm records and ABG sent to Dr Orozco for review, CTS consult completed, SW visit completed today. If no absolute contraindications today, pt to RTC in 3-4 weeks with PFT, lab and visit.

## 2023-02-27 NOTE — PROGRESS NOTES
Palliative Medicine Clinic Note      Consult Requested By: Dr. Barak Burnett    Primary Care Physician:   Cristobal Ann MD    Reason for Consult: Advance care planning and symptom management in the setting of heart Failure       ASSESSMENT/PLAN:     Plan/Recommendations:  Diagnoses and all orders for this visit:        Congestive heart failure, unspecified HF chronicity, unspecified heart failure type /   Acute decompensated heart failure / Pulmonary HTN  -     Ambulatory referral/consult to [facility] Palliative Care  -Undergoing transplant workup   -concerns for her pulm status  -her main goal is life prolongation and to be able to obtain a transplant       Fatigue/dyspnea  -discussed the importance of graded exercise  -discussed the possibility of rehab to improve her deconditioning       Depression  -discussed PM Bedhavioral therapy and  support       Nausea  -     ondansetron (ZOFRAN-ODT) 8 MG TbDL; Take 1 tablet (8 mg total) by mouth every 12 (twelve) hours as needed (nausea).        Palliative care encounter      Medicolegal: Has decision making capacity. Named her daughter Erika Estrada is HCPOA.     Psychosocial:  support system consists of granddaughter, daughter and extended faily     Spiritual: Tenriism      Understanding of disease and Illness Trajectory: Patient  has  adequate understanding of her illness, they can benefit from continued education on what to expect in the future.      Goals of care:    Advance Care Planning     Date: 02/27/2023      I engaged the patient in a conversation about advance care planning.  We discussed HCPOA, she wanted to change her current HCPOA and name her daughter Erika Estrada as her agent and her sister and son as backups     We specifically addressed what the goals of care would be moving forward, in light of the patient's change in clinical status, specifically transplant workup.  We did specifically address the patient's likely prognosis, which  is poor w/o a trasnplant.  We explored the patient's values and preferences for future care.  The patient endorses that what is most important right now is to focus on curative/life-prolongation (regardless of treatment burdens)    Accordingly, we have decided that the best plan to meet the patient's goals includes continuing with treatment            Code status: full code    Advance directives:HCPOA on file       16 min time was spent on advance care planning, goals of care discussion, emotional support, formulating and communicating prognosis and goals of care, exploring burden/benefit of various approaches of treatment.              Follow up: 1m         SUBJECTIVE:     History obtained from: patient     complaint: No chief complaint on file.        History of Present Illness / Interval History:  Hafsa Hawley is 57 y.o. year old female presenting with heart failure .  Referred to Palliative Care for evaluation and management of physical symptoms. female attended the appointment alone     She endorsed pain, mainly in the legs, also endorsed body aches. She describes neuropathic pain in her legs. Will start lyrica soon    Unable to walk much because of sob, not walking long distances    Has nausea and vomits every morning, taking promethazine or zofran    Feels depressed at times because of her condition    Her appetite is up and down   Feels tired every day all day  Not sleeping well, because of twitching and shaking at night    Intermittent diarrhea and constipation, takes immodium and miralax intermittently           Disease History:  NICM since 2013 HFrEF (EF 10%) s/p AICD placement  pulmonary hypertension  chronic hypoxic and hypercapnic respiratory failure   Paroxysmal A Fib on Eliquis, Pulmonary Hypertension  Hypertension, Type 2 Diabetes Mellitus     evaluated by our team and presented to committee on 3/9/22.  At the time she was declined for OHT or LVAD due to renal function, lung function and  difficulty consistently following up - undergoing evaluation for heart-kidney transplant    Previous experience or exposure to a serious illness: no      External  database queried on 03/05/2023  by Sandra Hernandes .   The results reviewed and considered with the clinical data in the decision whether or not to prescribe a controlled substance.      Medications:    Current Outpatient Medications:     albuterol-ipratropium (DUO-NEB) 2.5 mg-0.5 mg/3 mL nebulizer solution, Take 3 mLs by nebulization 2 (two) times a day., Disp: 90 mL, Rfl: 3    allopurinoL (ZYLOPRIM) 100 MG tablet, Take 1 tablet (100 mg total) by mouth daily as needed (gout attack)., Disp: , Rfl:     ALPRAZolam (XANAX) 2 MG Tab, Take 0.25-2 mg by mouth 2 (two) times daily as needed (anxiety)., Disp: , Rfl:     apixaban (ELIQUIS) 5 mg Tab, Take 1 tablet (5 mg total) by mouth 2 (two) times daily., Disp: 60 tablet, Rfl: 2    atorvastatin (LIPITOR) 40 MG tablet, Take 1 tablet (40 mg total) by mouth every evening., Disp: 90 tablet, Rfl: 3    b complex vitamins tablet, Take 1 tablet by mouth once daily., Disp: , Rfl:     blood sugar diagnostic (ACCU-CHEK GUIDE TEST STRIPS) Strp, 1 strip by Other route 3 (three) times daily., Disp: 100 each, Rfl: 11    bumetanide (BUMEX) 2 MG tablet, Take 2 tablets (4 mg total) by mouth 2 (two) times a day., Disp: 120 tablet, Rfl: 11    cetirizine (ZYRTEC) 10 MG tablet, Take 10 mg by mouth daily as needed for Allergies., Disp: , Rfl:     diclofenac sodium (VOLTAREN) 1 % Gel, APPLY 2 G TOPICALLY 3 (THREE) TIMES DAILY AS NEEDED (PAIN)., Disp: 400 g, Rfl: 0    empagliflozin (JARDIANCE) 10 mg tablet, Take 1 tablet (10 mg total) by mouth once daily., Disp: 30 tablet, Rfl: 0    ergocalciferol (ERGOCALCIFEROL) 50,000 unit Cap, Take 1 capsule (50,000 Units total) by mouth every 7 days. (Patient taking differently: Take 50,000 Units by mouth every Saturday.), Disp: 4 capsule, Rfl: 5    ferrous sulfate 325 (65 FE) MG EC tablet,  Take 1 tablet (325 mg total) by mouth once daily., Disp: 30 tablet, Rfl: 0    fluticasone propionate (FLONASE) 50 mcg/actuation nasal spray, 2 sprays by Each Nostril route daily as needed for Rhinitis. , Disp: , Rfl:     isosorbide dinitrate (ISORDIL) 30 MG Tab, Take 1 tablet (30 mg total) by mouth 3 (three) times daily., Disp: 90 tablet, Rfl: 11    lancets (ACCU-CHEK SOFTCLIX LANCETS) Misc, 1 Device by Misc.(Non-Drug; Combo Route) route 3 (three) times daily., Disp: 100 each, Rfl: 11    LIDOcaine (LIDODERM) 5 %, Place 1 patch onto the skin daily as needed (pain). Remove & Discard patch within 12 hours or as directed by MD, Disp: , Rfl:     metoprolol succinate (TOPROL-XL) 50 MG 24 hr tablet, Take 1 tablet (50 mg total) by mouth once daily., Disp: 30 tablet, Rfl: 11    pantoprazole (PROTONIX) 40 MG tablet, TAKE 1 TABLET BY MOUTH DAILY IN THE MORNING, Disp: 30 tablet, Rfl: 11    potassium chloride (MICRO-K) 10 MEQ CpSR, Take 1 capsule (10 mEq total) by mouth once daily., Disp: 30 capsule, Rfl: 11    pregabalin (LYRICA) 25 MG capsule, Take 1 capsule (25 mg total) by mouth 2 (two) times daily., Disp: 60 capsule, Rfl: 5    promethazine-codeine 6.25-10 mg/5 ml (PHENERGAN WITH CODEINE) 6.25-10 mg/5 mL syrup, TAKE 5 mLs BY MOUTH EVERY 4 - 6 HOURS AS NEEDED (Patient not taking: Reported on 3/3/2023), Disp: 177 mL, Rfl: 0    sacubitriL-valsartan (ENTRESTO) 49-51 mg per tablet, Take 1 tablet by mouth 2 (two) times daily., Disp: 60 tablet, Rfl: 0    scopolamine (TRANSDERM-SCOP) 1.3-1.5 mg (1 mg over 3 days), Place 1 patch onto the skin every 72 hours as needed (nausea)., Disp: 24 patch, Rfl: 2    semaglutide (OZEMPIC) 0.25 mg or 0.5 mg(2 mg/1.5 mL) pen injector, Take 0.25 mg for 4 weeks, then increase to 0.5 mg weekly, Disp: 1 pen, Rfl: 0    tiotropium bromide (SPIRIVA RESPIMAT) 1.25 mcg/actuation inhaler, Inhale 2 puffs into the lungs once daily. Controller, Disp: 4 g, Rfl: 5    VENTOLIN HFA 90 mcg/actuation inhaler, Inhale 2  puffs into the lungs every 6 (six) hours as needed for Shortness of Breath or Wheezing., Disp: , Rfl: 11    amiodarone (PACERONE) 200 MG Tab, Take 200 mg by mouth., Disp: , Rfl:     mirtazapine (REMERON) 7.5 MG Tab, Take 1 tablet (7.5 mg total) by mouth every evening., Disp: 30 tablet, Rfl: 1    mometasone-formoterol (DULERA) 200-5 mcg/actuation inhaler, Inhale 2 puffs into the lungs 2 (two) times daily. Controller, Disp: 13 g, Rfl: 11    NITROSTAT 0.4 mg SL tablet, Take 2.5 tablets (1 mg total) by mouth every 5 (five) minutes as needed for Chest pain. No more than 3 tablets in 15 minutes., Disp: , Rfl:     ondansetron (ZOFRAN-ODT) 8 MG TbDL, Take 1 tablet (8 mg total) by mouth every 12 (twelve) hours as needed (nausea)., Disp: , Rfl:   No current facility-administered medications for this visit.    Facility-Administered Medications Ordered in Other Visits:     0.9%  NaCl infusion, , Intravenous, Continuous, Corinna Hayes NP, New Bag at 05/23/19 0745    vancomycin in dextrose 5 % 1 gram/250 mL IVPB 1,000 mg, 1,000 mg, Intravenous, On Call Procedure, Corinna Hayes NP, 1,000 mg at 05/23/19 0736      Past Medical History:   Diagnosis Date    Anemia     Arthritis     Atrial fibrillation     OAC    Chronic respiratory failure with hypoxia, on home oxygen therapy     2L with activity, off at rest.  Per Pulm  no overt evidence of ILD or COPD on PFTs and CT to explain O2 needs.    CKD (chronic kidney disease), stage IV 05/08/2018    Congestive heart failure     s/p AICD placement,    Deep vein thrombosis     Depression     elevated bilirubin d/t Gilbert's syndrome     confirmed by [place] genetic testing, evaluated by hepatology    Encounter for blood transfusion     GERD (gastroesophageal reflux disease)     Hypertension     Pheochromocytoma, malignant     Right kidney mass     Sleep apnea     Thalassemia trait, alpha     Thyroid disease     Type 2 diabetes mellitus with hyperglycemia, without long-term  current use of insulin 2020     Past Surgical History:   Procedure Laterality Date    APPENDECTOMY      BONE MARROW BIOPSY      CARDIAC DEFIBRILLATOR PLACEMENT Left 2016    CARDIAC ELECTROPHYSIOLOGY MAPPING AND ABLATION      CARDIAC ELECTROPHYSIOLOGY MAPPING AND ABLATION      COLONOSCOPY N/A 2022    Procedure: COLONOSCOPY;  Surgeon: Arely Betancourt MD;  Location: [facility] ([facility]);  Service: Endoscopy;  Laterality: N/A;  heart transplant candidate/ EF 25% - 2nd floor/ defib - Biotron[facility] - ERW  Eliquis - per Dr. Cortez with [facility], Pt ok to hold Eliquis x 2 days prior-see media tab-outside correspondence dated 21  - [facility]  verbal instructions/portal instructions/email instructions - s    EYE SURGERY      due to running tears    FRACTURE SURGERY Left     hand 5th digit    HYSTERECTOMY      KNEE SURGERY Left 2016    hematoma    LIVER BIOPSY  10/24/2018    Minimal steatosis, predominantly macrovesicular, 1%, Minimal nonspecific portal inflammation, no fibrosis. No findings on biopsy to explain elevated bilirubin levels. Could be d/t Gilbert's =?- hemolysis    RIGHT HEART CATHETERIZATION Right 2021    Procedure: INSERTION, CATHETER, RIGHT HEART;  Surgeon: Irving Cardenas MD;  Location: [facility] CATH LAB;  Service: Cardiology;  Laterality: Right;    RIGHT HEART CATHETERIZATION Right 2022    Procedure: INSERTION, CATHETER, RIGHT HEART;  Surgeon: Burke Camilo MD;  Location: [facility] CATH LAB;  Service: Cardiology;  Laterality: Right;    TRANSJUGULAR BIOPSY OF LIVER N/A 10/24/2018    Procedure: BIOPSY, LIVER, TRANSJUGULAR APPROACH;  Surgeon: Carmen Diagnostic Provider;  Location: [facility] OR [address] [place] [address];  Service: Radiology;  Laterality: N/A;     Family History   Problem Relation Age of Onset    Cancer Mother         pancreatic CA early 50's    Heart disease Father          MI in late 50's    Hypertension Father     Heart attack Father     Heart disease Sister     Heart disease Brother      "Cirrhosis Brother         alcoholic    Heart disease Sister     Heart disease Brother     Hypertension Brother     Diabetes Brother      Review of patient's allergies indicates:   Allergen Reactions    Penicillins Hives and Other (See Comments)    Iodinated contrast media Nausea And Vomiting    Oxycodone-acetaminophen Other (See Comments)     Nausea, Dizziness, Anxiety.  "I don't like how it makes me feel."   Given Hydromorphone 0.5mg IVP  Without problems.  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 Metabolism of other drugs not listed here may also be impacted by UGT1A1 enzyme activity.       Tramadol Nausea And Vomiting and Other (See Comments)     Other reaction(s): Other (See Comments)    Valsartan Other (See Comments)       OBJECTIVE:       ROS:  Review of Systems   Constitutional:  Positive for appetite change and fatigue. Negative for activity change and unexpected weight change.   Respiratory:  Positive for shortness of breath. Negative for cough and wheezing.    Cardiovascular:  Negative for chest pain and leg swelling.   Gastrointestinal:  Positive for nausea. Negative for constipation, diarrhea and vomiting.   Genitourinary:  Negative for dysuria.   Musculoskeletal:  Positive for leg pain. Negative for arthralgias, back pain and gait problem.   Neurological:  Negative for dizziness, weakness and memory loss.   Psychiatric/Behavioral:  Positive for dysphoric " "mood. The patient is nervous/anxious.        Review of Symptoms      Symptom Assessment (ESAS 0-10 Scale)  Pain:  6  Dyspnea:  5  Anxiety:  0  Nausea:  4  Depression:  4  Anorexia:  5  Fatigue:  9  Insomnia:  4  Restlessness:  0  Agitation:  0     CAM / Delirium:  Negative  Constipation:  Negative  Diarrhea:  Negative    Anxiety:  Is nervous/anxious  Constipation:  No constipation    Pain Assessment:    Location(s): leg    Leg       Location: bilateral        Quality: Aching        Quantity: 6/10 in intensity        Chronicity: Onset 1 year(s) ago, unchanged        Aggravating Factors: None        Alleviating Factors: None        Associated Symptoms: None    Performance Status:  70    Living Arrangements:  Lives with family    Psychosocial/Cultural:   See Palliative Psychosocial Note: Yes  Lives with  and granddaughter. Pt lives in a mobile home with 5 NIRAJ. Pt also has support from her two sisters Benoit Baum [phone], Jeanie Jaimes [phone]. Pt is mostly independent with ADL's, but has a RW, SC and home oxygen, which she states she uses "as needed".   dgtr Erika Estrada, 33, [place], son Miguel Baum, 35  **Primary  to Follow**  Palliative Care  Consult: Yes    Spiritual:  F - Damaris and Belief:  Mu-ism     Advance Care Planning   Advance Directives:   Living Will: No    LaPOST: No    Do Not Resuscitate Status: No    Medical Power of : Yes    Agent's Name:  Erika Estrada    Decision Making:  Patient answered questions  Goals of Care: The patient endorses that what is most important right now is to focus on remaining as independent as possible and extending life as long as possible, even it it means sacrificing quality    Accordingly, we have decided that the best plan to meet the patient's goals includes continuing with treatment            Physical Exam:  Vitals: Pulse: 76 (02/27/23 0959)  BP: 131/74 (02/27/23 0959)  SpO2: (!) 94 % (02/27/23 0959)  Physical " Exam  Constitutional:       General: She is not in acute distress.  HENT:      Head: Normocephalic and atraumatic.   Eyes:      General: No scleral icterus.  Pulmonary:      Effort: Pulmonary effort is normal. No respiratory distress.   Abdominal:      General: There is no distension.   Musculoskeletal:      Cervical back: Neck supple.   Skin:     Findings: No rash.   Neurological:      Mental Status: She is alert and oriented to person, place, and time.   Psychiatric:         Mood and Affect: Mood and affect normal.         Labs:  CBC:   WBC   Date Value Ref Range Status   02/21/2023 4.48 3.90 - 12.70 K/uL Final       Hemoglobin   Date Value Ref Range Status   02/21/2023 8.0 (L) 12.0 - 16.0 g/dL Final       POC Hematocrit   Date Value Ref Range Status   12/07/2021 45 36 - 54 %PCV Final     Hematocrit   Date Value Ref Range Status   02/21/2023 27.4 (L) 37.0 - 48.5 % Final       MCV   Date Value Ref Range Status   02/21/2023 58 (L) 82 - 98 fL Final       Platelets   Date Value Ref Range Status   02/21/2023 172 150 - 450 K/uL Final       LFT:   Lab Results   Component Value Date    AST 23 02/21/2023    ALKPHOS 54 (L) 02/21/2023    BILITOT 1.6 (H) 02/21/2023       Albumin:   Albumin   Date Value Ref Range Status   02/21/2023 3.6 3.5 - 5.2 g/dL Final     Protein:   Total Protein   Date Value Ref Range Status   02/21/2023 6.3 6.0 - 8.4 g/dL Final         Radiology:I have reviewed all pertinent imaging results/findings within the past 24 hours.  Results for orders placed during the hospital encounter of 11/15/22    Echo    Interpretation Summary  · The left ventricle is severely enlarged with eccentric hypertrophy and severely decreased systolic function. The estimated ejection fraction is 10%.  · The right ventricle is not well visualized but appears normal in size with moderately reduced systolic function.  · Grade II left ventricular diastolic dysfunction.  · Biatrial enlargement.  · The estimated PA systolic pressure  is 65 mmHg.  · Elevated central venous pressure (15 mmHg).  · Small posterolateral pericardial effusion.        I spent a total of 50 minutes on the day of the visit. This includes face to face time in discussion of goals of care, symptom assessment, coordination of care and emotional support.  This also includes non-face to face time preparing to see the patient (eg, review of tests/imaging), obtaining and/or reviewing separately obtained history, documenting clinical information in the electronic or other health record, independently interpreting results and communicating results to the patient/family/caregiver, or care coordinator.     16 minutes spent in discussing ACP    This note was partially created using [employer] Voice Recognition software. Typographical and content errors may occur with this process. While efforts are made to detect and correct such errors, in some cases errors will persist. For this reason, wording in this document should be considered in the proper context and not strictly verbatim.      Signature: Sandra Hernandes MD

## 2023-02-27 NOTE — PROGRESS NOTES
Subjective:      Patient ID: Hafsa Hawley is a 57 y.o. female.    Chief Complaint: No chief complaint on file.      HPI:  Hafsa Hawley is a 57 y.o. female who presents for surgical evaluation of heart-kidney transplantation. Medical conditions include NICM, anemia, arthritis, Afib, home O2, elevated bilirubin due to [place], GERD, HTN, malignant pheochromocytoma, MELISSA, alpha thalassemia trait, DM2, pulmonary hypertension. She was previously evaluated by our team and presented to committee on 3/9/22.  At the time she was declined for OHT or LVAD due to renal function, lung function and difficulty consistently following up with the team. Now being re-evaluated. Patient has been admitted to the hospital several times recently with heart failure exacerbation. Presented to clinic today in a wheelchair but states that she does not use any sort of assistive devices at home. Has been off home oxygen since last Wednesday when she was discharged from the hospital. No prior strokes, seizures, blood clots, stents, or sternotomies. No smoking or drinking history.     Family and social history reviewed    B+   FEV1 47.4  DLCO 35.6    Current Outpatient Medications   Medication Instructions    albuterol-ipratropium (DUO-NEB) 2.5 mg-0.5 mg/3 mL nebulizer solution 3 mLs, Nebulization, 2 times daily    allopurinoL (ZYLOPRIM) 100 mg, Oral, Daily PRN    ALPRAZolam (XANAX) 0.25-2 mg, Oral, 2 times daily PRN    apixaban (ELIQUIS) 5 mg, Oral, 2 times daily    atorvastatin (LIPITOR) 40 mg, Oral, Nightly    b complex vitamins tablet 1 tablet, Oral, Daily    blood sugar diagnostic (ACCU-CHEK GUIDE TEST STRIPS) Strp 1 strip, Other, 3 times daily    bumetanide (BUMEX) 4 mg, Oral, 2 times daily    cetirizine (ZYRTEC) 10 mg, Oral, Daily PRN    diclofenac sodium (VOLTAREN) 2 g, Topical (Top), 3 times daily PRN    empagliflozin (JARDIANCE) 10 mg, Oral, Daily    ergocalciferol (ERGOCALCIFEROL) 50,000 Units, Oral, Every 7 days    ferrous  "sulfate 325 mg, Oral, Daily    fluticasone propionate (FLONASE) 50 mcg/actuation nasal spray 2 sprays, Each Nostril, Daily PRN    isosorbide dinitrate (ISORDIL) 30 mg, Oral, 3 times daily    lancets (ACCU-CHEK SOFTCLIX LANCETS) Misc 1 Device, Misc.(Non-Drug; Combo Route), 3 times daily    LIDOcaine (LIDODERM) 5 % 1 patch, Transdermal, Daily PRN, Remove & Discard patch within 12 hours or as directed by MD    metoprolol succinate (TOPROL-XL) 50 mg, Oral, Daily    mometasone-formoterol (DULERA) 200-5 mcg/actuation inhaler 2 puffs, Inhalation, 2 times daily, Controller    NITROSTAT 1 mg, Oral, Every 5 min PRN, No more than 3 tablets in 15 minutes.    ondansetron (ZOFRAN-ODT) 4 mg, Oral, Every 12 hours PRN    pantoprazole (PROTONIX) 40 MG tablet TAKE 1 TABLET BY MOUTH DAILY IN THE MORNING    potassium chloride (MICRO-K) 10 MEQ CpSR 10 mEq, Oral, Daily    pregabalin (LYRICA) 25 mg, Oral, 2 times daily    promethazine-codeine 6.25-10 mg/5 ml (PHENERGAN WITH CODEINE) 6.25-10 mg/5 mL syrup TAKE 5 mLs BY MOUTH EVERY 4 - 6 HOURS AS NEEDED    rOPINIRole (REQUIP) 4 mg, Oral, Daily, Pt taking 4mg daily    sacubitriL-valsartan (ENTRESTO) 49-51 mg per tablet 1 tablet, Oral, 2 times daily    scopolamine (TRANSDERM-SCOP) 1.3-1.5 mg (1 mg over 3 days) 1 patch, Transdermal, Every 72 hours PRN    semaglutide (OZEMPIC) 0.25 mg or 0.5 mg(2 mg/1.5 mL) pen injector Take 0.25 mg for 4 weeks, then increase to 0.5 mg weekly    tiotropium bromide (SPIRIVA RESPIMAT) 1.25 mcg/actuation inhaler 2 puffs, Inhalation, Daily, Controller    VENTOLIN HFA 90 mcg/actuation inhaler 2 puffs, Inhalation, Every 6 hours PRN           Review of patient's allergies indicates:   Allergen Reactions    Penicillins Hives and Other (See Comments)    Iodinated contrast media Nausea And Vomiting    Oxycodone-acetaminophen Other (See Comments)     Nausea, Dizziness, Anxiety.  "I don't like how it makes me feel."   Given Hydromorphone 0.5mg IVP  Without problems.  Other " reaction(s): Other (See Comments)    Clonazepam Other (See Comments)    Diovan hct [valsartan-hydrochlorothiazide] Other (See Comments)     Causes coughing    Iodine Other (See Comments)    Irinotecan      Pt has homozygosity for the TA7 promoter variant that places this individual at significantly increased risk for   severe neutropenia (grade 4) when treated with the standard dose of irinotecan (risk approximately 50%).   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 Metabolism of other drugs not listed here may also be impacted by UGT1A1 enzyme activity.       Tramadol Nausea And Vomiting and Other (See Comments)     Other reaction(s): Other (See Comments)    Valsartan Other (See Comments)     Past Medical History:   Diagnosis Date    Anemia     Arthritis     Atrial fibrillation     OAC    Chronic respiratory failure with hypoxia, on home oxygen therapy     2L with activity, off at rest.  Per Pulm  no overt evidence of ILD or COPD on PFTs and CT to explain O2 needs.    CKD (chronic kidney disease), stage IV 05/08/2018    Congestive heart failure     s/p AICD placement,    Deep vein thrombosis     Depression     elevated bilirubin d/t Gilbert's syndrome     confirmed by [place] genetic testing, evaluated by hepatology    Encounter for blood transfusion     GERD (gastroesophageal reflux disease)     Hypertension     Pheochromocytoma, malignant     Right kidney mass     Sleep apnea     Thalassemia trait, alpha     Thyroid disease     Type 2 diabetes mellitus with hyperglycemia, without long-term current use of insulin 08/13/2020     Past Surgical History:   Procedure Laterality Date    APPENDECTOMY      BONE MARROW BIOPSY      CARDIAC DEFIBRILLATOR PLACEMENT Left 12/2016    CARDIAC ELECTROPHYSIOLOGY MAPPING AND ABLATION      CARDIAC ELECTROPHYSIOLOGY MAPPING AND ABLATION      COLONOSCOPY N/A 5/6/2022     Procedure: COLONOSCOPY;  Surgeon: Arely Betancourt MD;  Location: [facility] ENDO (2ND FLR);  Service: Endoscopy;  Laterality: N/A;  heart transplant candidate/ EF 25% - 2nd floor/ defib - Biotronik - ERW  Eliquis - per Dr. Cortez with [facility] [place], Pt ok to hold Eliquis x 2 days prior-see media tab-outside correspondence dated 12/30/21  - ERW  verbal instructions/portal instructions/email instructions - s    EYE SURGERY      due to running tears    FRACTURE SURGERY Left     hand 5th digit    HYSTERECTOMY      KNEE SURGERY Left 2016    hematoma    LIVER BIOPSY  10/24/2018    Minimal steatosis, predominantly macrovesicular, 1%, Minimal nonspecific portal inflammation, no fibrosis. No findings on biopsy to explain elevated bilirubin levels. Could be d/t Gilbert's =?- hemolysis    RIGHT HEART CATHETERIZATION Right 12/07/2021    Procedure: INSERTION, CATHETER, RIGHT HEART;  Surgeon: Irving Cardenas MD;  Location: [facility] CATH LAB;  Service: Cardiology;  Laterality: Right;    RIGHT HEART CATHETERIZATION Right 12/19/2022    Procedure: INSERTION, CATHETER, RIGHT HEART;  Surgeon: Burke Camilo MD;  Location: [facility] CATH LAB;  Service: Cardiology;  Laterality: Right;    TRANSJUGULAR BIOPSY OF LIVER N/A 10/24/2018    Procedure: BIOPSY, LIVER, TRANSJUGULAR APPROACH;  [facility];  [facility];  Service: Radiology;  Laterality: N/A;     Family History       Problem Relation (Age of Onset)    Cancer Mother    Cirrhosis Brother    Diabetes Brother    Heart attack Father    Heart disease Father, Sister, Brother, Sister, Brother    Hypertension Father, Brother          Social History     Socioeconomic History    Marital status:    Tobacco Use    Smoking status: Never    Smokeless tobacco: Never   Substance and Sexual Activity    Alcohol use: Yes     Comment: up to 1 yr ago drank 2-3 drinks on occasion but sporadic    Drug use: No    Sexual activity: Yes     Partners: Male   Social History  Narrative    On disability since 2013     Social Determinants of Health     Financial Resource Strain: Low Risk     Difficulty of Paying Living Expenses: Not hard at all   Food Insecurity: No Food Insecurity    Worried About Running Out of Food in the Last Year: Never true    Ran Out of Food in the Last Year: Never true   Transportation Needs: No Transportation Needs    Lack of Transportation (Medical): No    Lack of Transportation (Non-Medical): No   Physical Activity: Inactive    Days of Exercise per Week: 0 days    Minutes of Exercise per Session: 0 min   Stress: Stress Concern Present    Feeling of Stress : Very much   Social Connections: Moderately Isolated    Frequency of Communication with Friends and Family: Once a week    Frequency of Social Gatherings with Friends and Family: Never    Attends Rastafarian Services: More than 4 times per year    Active Member of Clubs or Organizations: Yes    Attends Club or Organization Meetings: More than 4 times per year    Marital Status:    Housing Stability: Low Risk     Unable to Pay for Housing in the Last Year: No    Number of Places Lived in the Last Year: 1    Unstable Housing in the Last Year: No       Current medications Reviewed    Review of Systems   Constitutional:  Positive for activity change and fatigue.   HENT:  Negative for nosebleeds.    Eyes:  Negative for visual disturbance.   Respiratory:  Positive for shortness of breath.    Cardiovascular:  Negative for chest pain.   Gastrointestinal:  Negative for nausea.   Musculoskeletal:  Negative for gait problem.   Skin:  Negative for color change.   Neurological:  Negative for seizures.   Hematological:  Does not bruise/bleed easily.   Psychiatric/Behavioral:  Negative for sleep disturbance.    Objective:   Physical Exam  Constitutional:       General: She is not in acute distress.  HENT:      Head: Normocephalic and atraumatic.   Eyes:      Pupils: Pupils are equal, round, and reactive to light.    Cardiovascular:      Rate and Rhythm: Normal rate.   Pulmonary:      Effort: Pulmonary effort is normal. No respiratory distress.   Musculoskeletal:         General: Normal range of motion.      Cervical back: Normal range of motion.   Skin:     Coloration: Skin is not pale.   Neurological:      General: No focal deficit present.      Mental Status: She is alert.   Psychiatric:         Mood and Affect: Mood normal.         Behavior: Behavior normal.       Diagnostic Results: reviewed   6MW 2/16/23  Six minute walk distance is 243.84 meters (800 feet) with very heavy dyspnea.  During exercise, there was significant desaturation while breathing room air.  Blood pressure remained stable and Heart rate increased significantly with walking.  The patient reported non-pulmonary symptoms during exercise.  Significant exercise impairment is likely due to desaturation and subjective symptoms.  The patient did complete the study, walking 326 seconds of the 360 second test.  The patient may benefit from using supplemental oxygen during exertion.  Since the previous study in December 2021, exercise capacity is unchanged.  Based upon age and body mass index, exercise capacity is less than predicted.   Oxygen saturation did improve while breathing supplemental oxygen.    CT 2/16/23  No acute abnormalities in the chest, abdomen or pelvis.     Stable cardiomegaly with small pericardial effusion.     Stable right adrenal nodule.     Diverticulosis.    RHC 2/19/23    The estimated blood loss was none.    RA 13 PA 78/40 (52)  PCWP 26    CO 4.7 l/min  CI 2.3 l/min/m2    PVR 5.5 BOLDEN.    SVR 1333 dynes/cm5.s      TTE 11/17/22  The left ventricle is severely enlarged with eccentric hypertrophy and severely decreased systolic function. The estimated ejection fraction is 10%.  The right ventricle is not well visualized but appears normal in size with moderately reduced systolic function.  Grade II left ventricular diastolic  dysfunction.  Biatrial enlargement.  The estimated PA systolic pressure is 65 mmHg.  Elevated central venous pressure (15 mmHg).  Small posterolateral pericardial effusion.  Assessment:   NICM   CKD 4  Plan:   Very concerned about patient's pulmonary status. She may benefit from cardio-pulmonary rehab. Recommend repeating pulmonary function testing once optimized before considering surgical options.

## 2023-02-28 NOTE — TELEPHONE ENCOUNTER
This would be a question for her cardiologist. Otherwise, I would go with whatever dose is on the recent discharge summary from the hospital.

## 2023-02-28 NOTE — PROGRESS NOTES
Pt presents as AAO x4, calm, cooperative, and asking and answering questions appropriately, Pt's granddaughter, Shavonne present. Pt states she has been doing well and trying to increase her motivation to be more active. Pt states her medical team would like her to increase her daily movement. Pt states she often sits as her neuropathy in both feet and legs cause her pain when she stands, Pt states her medical team is addressing her neuropathy. Pt states she is trying to walk more and has a three wheel bike that she can ride. LCSW encouraged Pt to ask her family and friends to engage in increased activity with her. Pt brought all of her medications in large pill box, she states she has been taking her medications as prescribed and rates herself high on adherence. Pt states her medical team has changed several of her medications and she feels like she has been able to keep up on this. Pt states her primary caregiver Jeanie Jaimes and backup caregivers Laura Theodore and Beulah Hawley remain the same. LCSW offered support and encouragement. No additional needs or questions addressed during visit. SW providing ongoing psychosocial, counseling, & emotional support, education, resources, assistance, and discharge planning as indicated.  SW to continue to follow.

## 2023-03-01 RX ORDER — ONDANSETRON 8 MG/1
8 TABLET, ORALLY DISINTEGRATING ORAL EVERY 12 HOURS PRN
Status: ON HOLD
Start: 2023-03-01 | End: 2023-04-27

## 2023-03-02 ENCOUNTER — HOSPITAL ENCOUNTER (EMERGENCY)
Facility: HOSPITAL | Age: 58
Discharge: HOME | End: 2023-03-02
Attending: STUDENT IN AN ORGANIZED HEALTH CARE EDUCATION/TRAINING PROGRAM
Payer: MEDICAID

## 2023-03-02 VITALS
HEART RATE: 94 BPM | OXYGEN SATURATION: 95 % | SYSTOLIC BLOOD PRESSURE: 124 MMHG | DIASTOLIC BLOOD PRESSURE: 77 MMHG | BODY MASS INDEX: 30.96 KG/M2 | RESPIRATION RATE: 20 BRPM | WEIGHT: 191.81 LBS

## 2023-03-02 DIAGNOSIS — T14.8XXA BLEEDING FROM WOUND: Primary | ICD-10-CM

## 2023-03-02 PROCEDURE — 99283 EMERGENCY DEPT VISIT LOW MDM: CPT | Mod: NTX

## 2023-03-02 NOTE — ED PROVIDER NOTES
"Encounter Date: 3/2/2023    This document was partially completed using speech recognition software and may contain misspellings, grammatical errors, and/or unexpected word substitutions.       History     Chief Complaint   Patient presents with    Vaginal Bleeding     Pt reports having a lump by her vaginal region, and popped it earlier tonight, Area has been bleeding since she popped it.  Pt denies pain.     57 year old female with a PMHx of Afib on eliquis, CKD, chronic respiratory failure with hypoxia, CKD, CHF, depression, DM, HTN presents to the ED with pelvic bleeding. She states she felt a bump in her groin and scratched it and picked at it until it started bleeding. However, it never stopped for the past hour. She denies any lightheadedness, syncope, chest pain, shortness of breath.      Review of patient's allergies indicates:   Allergen Reactions    Penicillins Hives and Other (See Comments)    Iodinated contrast media Nausea And Vomiting    Oxycodone-acetaminophen Other (See Comments)     Nausea, Dizziness, Anxiety.  "I don't like how it makes me feel."   Given Hydromorphone 0.5mg IVP  Without problems.  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 Metabolism of other drugs not listed here may also be impacted by UGT1A1 enzyme activity.       Tramadol Nausea And Vomiting and Other (See Comments)     Other reaction(s): Other (See Comments)    Valsartan Other " (See Comments)     Past Medical History:   Diagnosis Date    Anemia     Arthritis     Atrial fibrillation     OAC    Chronic respiratory failure with hypoxia, on home oxygen therapy     2L with activity, off at rest.  Per Pulm  no overt evidence of ILD or COPD on PFTs and CT to explain O2 needs.    CKD (chronic kidney disease), stage IV 05/08/2018    Congestive heart failure     s/p AICD placement,    Deep vein thrombosis     Depression     elevated bilirubin d/t Gilbert's syndrome     confirmed by [place] genetic testing, evaluated by hepatology    Encounter for blood transfusion     GERD (gastroesophageal reflux disease)     Hypertension     Pheochromocytoma, malignant     Right kidney mass     Sleep apnea     Thalassemia trait, alpha     Thyroid disease     Type 2 diabetes mellitus with hyperglycemia, without long-term current use of insulin 08/13/2020     Past Surgical History:   Procedure Laterality Date    APPENDECTOMY      BONE MARROW BIOPSY      CARDIAC DEFIBRILLATOR PLACEMENT Left 12/2016    CARDIAC ELECTROPHYSIOLOGY MAPPING AND ABLATION      CARDIAC ELECTROPHYSIOLOGY MAPPING AND ABLATION      COLONOSCOPY N/A 5/6/2022    Procedure: COLONOSCOPY;  Surgeon: Arely Betancourt MD;  Location: [facility] ([address] [place] [address]);  Service: Endoscopy;  Laterality: N/A;  heart transplant candidate/ EF 25% - 2nd floor/ defib - Biotronik - ERW  Eliquis - per Dr. Cortez with [facility] [place], Pt ok to hold Eliquis x 2 days prior-see media tab-outside correspondence dated 12/30/21  - ERW  verbal instructions/portal instructions/email instructions - s    EYE SURGERY      due to running tears    FRACTURE SURGERY Left     hand 5th digit    HYSTERECTOMY      KNEE SURGERY Left 2016    hematoma    LIVER BIOPSY  10/24/2018    Minimal steatosis, predominantly macrovesicular, 1%, Minimal nonspecific portal inflammation, no fibrosis. No findings on biopsy to explain elevated bilirubin levels. Could be d/t Gilbert's =?- hemolysis    RIGHT HEART  CATHETERIZATION Right 2021    Procedure: INSERTION, CATHETER, RIGHT HEART;  Surgeon: Irving Cardenas MD;  Location: [facility] CATH LAB;  Service: Cardiology;  Laterality: Right;    RIGHT HEART CATHETERIZATION Right 2022    Procedure: INSERTION, CATHETER, RIGHT HEART;  Surgeon: Burke Camilo MD;  Location: [facility] CATH LAB;  Service: Cardiology;  Laterality: Right;    TRANSJUGULAR BIOPSY OF LIVER N/A 10/24/2018    Procedure: BIOPSY, LIVER, TRANSJUGULAR APPROACH;  Surgeon: Carmen Diagnostic Provider;  [facility];  Service: Radiology;  Laterality: N/A;     Family History   Problem Relation Age of Onset    Cancer Mother         pancreatic CA early 50's    Heart disease Father          MI in late 50's    Hypertension Father     Heart attack Father     Heart disease Sister     Heart disease Brother     Cirrhosis Brother         alcoholic    Heart disease Sister     Heart disease Brother     Hypertension Brother     Diabetes Brother      Social History     Tobacco Use    Smoking status: Never    Smokeless tobacco: Never   Substance Use Topics    Alcohol use: Yes     Comment: up to 1 yr ago drank 2-3 drinks on occasion but sporadic    Drug use: No     Review of Systems   Constitutional:  Negative for chills and fever.   HENT:  Negative for congestion, rhinorrhea and sneezing.    Eyes:  Negative for discharge and redness.   Respiratory:  Negative for cough and shortness of breath.    Cardiovascular:  Negative for chest pain and palpitations.   Gastrointestinal:  Negative for abdominal pain, diarrhea, nausea and vomiting.   Genitourinary:  Positive for vaginal bleeding. Negative for dysuria, frequency and vaginal discharge.   Musculoskeletal:  Negative for back pain and neck pain.   Skin:  Negative for rash and wound.   Neurological:  Negative for weakness, numbness and headaches.     Physical Exam     Initial Vitals [23 0121]   BP Pulse Resp Temp SpO2   124/77 99 (!) 22 -- (!) 89 %      MAP        --         Physical Exam    Nursing note and vitals reviewed.  Constitutional: She appears well-developed. She is not diaphoretic. No distress.   HENT:   Head: Normocephalic and atraumatic.   Right Ear: External ear normal.   Left Ear: External ear normal.   Eyes: Right eye exhibits no discharge. Left eye exhibits no discharge. No scleral icterus.   Neck: Neck supple.   Cardiovascular:  Normal rate and regular rhythm.           Murmur heard.  Pulmonary/Chest: Breath sounds normal. No stridor. No respiratory distress. She has no wheezes. She has no rhonchi. She has no rales.   Abdominal: Abdomen is soft. There is no abdominal tenderness. There is no guarding.   Genitourinary:       Musculoskeletal:         General: No edema.      Cervical back: Neck supple.     Neurological: She is alert and oriented to person, place, and time.   Skin: Skin is warm and dry. Capillary refill takes less than 2 seconds.   Psychiatric: She has a normal mood and affect.       ED Course   Procedures  Labs Reviewed - No data to display       Imaging Results    None          Medications - No data to display  Medical Decision Making:   Differential Diagnosis:   Ddx: abscess, bartholin cyst, wound, folliculitis  ED Management:  Based on the patient's evaluation - patient appears well for discharge home. Single raised bump in groin bleeding - likely continuous due to being on anticoagulation. Cauterized with silver nitrate. Observed for 20 minutes without any further bleeding. Discharged home.                        Clinical Impression:   Final diagnoses:  [T14.8XXA] Bleeding from wound (Primary)        ED Disposition Condition    Discharge Stable          ED Prescriptions    None       Follow-up Information       Follow up With Specialties Details Why Contact Info    [facility] - Emergency Dept Emergency Medicine Go to  If symptoms worsen [id number] [facility] [id number] [phone]             Bentley Beard DO  03/02/23  0145

## 2023-03-03 ENCOUNTER — OFFICE VISIT (OUTPATIENT)
Dept: FAMILY MEDICINE | Facility: CLINIC | Age: 58
End: 2023-03-03
Payer: MEDICAID

## 2023-03-03 VITALS
OXYGEN SATURATION: 92 % | SYSTOLIC BLOOD PRESSURE: 122 MMHG | RESPIRATION RATE: 18 BRPM | DIASTOLIC BLOOD PRESSURE: 80 MMHG | WEIGHT: 198.44 LBS | BODY MASS INDEX: 31.89 KG/M2 | HEART RATE: 80 BPM | HEIGHT: 66 IN

## 2023-03-03 DIAGNOSIS — I48.0 PAROXYSMAL ATRIAL FIBRILLATION: Chronic | ICD-10-CM

## 2023-03-03 DIAGNOSIS — J96.11 CHRONIC RESPIRATORY FAILURE WITH HYPOXIA AND HYPERCAPNIA: ICD-10-CM

## 2023-03-03 DIAGNOSIS — F06.31 DEPRESSION DUE TO PHYSICAL ILLNESS: ICD-10-CM

## 2023-03-03 DIAGNOSIS — J96.12 CHRONIC RESPIRATORY FAILURE WITH HYPOXIA AND HYPERCAPNIA: ICD-10-CM

## 2023-03-03 DIAGNOSIS — G62.9 NEUROPATHY: ICD-10-CM

## 2023-03-03 DIAGNOSIS — I50.42 CHRONIC COMBINED SYSTOLIC AND DIASTOLIC HEART FAILURE: ICD-10-CM

## 2023-03-03 DIAGNOSIS — Z09 HOSPITAL DISCHARGE FOLLOW-UP: Primary | ICD-10-CM

## 2023-03-03 DIAGNOSIS — G47.00 INSOMNIA, UNSPECIFIED TYPE: ICD-10-CM

## 2023-03-03 PROCEDURE — 3079F DIAST BP 80-89 MM HG: CPT | Mod: CPTII,,, | Performed by: STUDENT IN AN ORGANIZED HEALTH CARE EDUCATION/TRAINING PROGRAM

## 2023-03-03 PROCEDURE — 1159F PR MEDICATION LIST DOCUMENTED IN MEDICAL RECORD: ICD-10-PCS | Mod: CPTII,,, | Performed by: STUDENT IN AN ORGANIZED HEALTH CARE EDUCATION/TRAINING PROGRAM

## 2023-03-03 PROCEDURE — 3008F BODY MASS INDEX DOCD: CPT | Mod: CPTII,,, | Performed by: STUDENT IN AN ORGANIZED HEALTH CARE EDUCATION/TRAINING PROGRAM

## 2023-03-03 PROCEDURE — 99999 PR PBB SHADOW E&M-EST. PATIENT-LVL IV: CPT | Mod: PBBFAC,,, | Performed by: STUDENT IN AN ORGANIZED HEALTH CARE EDUCATION/TRAINING PROGRAM

## 2023-03-03 PROCEDURE — 99214 PR OFFICE/OUTPT VISIT, EST, LEVL IV, 30-39 MIN: ICD-10-PCS | Mod: S$PBB,,, | Performed by: STUDENT IN AN ORGANIZED HEALTH CARE EDUCATION/TRAINING PROGRAM

## 2023-03-03 PROCEDURE — 99999 PR PBB SHADOW E&M-EST. PATIENT-LVL IV: ICD-10-PCS | Mod: PBBFAC,,, | Performed by: STUDENT IN AN ORGANIZED HEALTH CARE EDUCATION/TRAINING PROGRAM

## 2023-03-03 PROCEDURE — 3074F PR MOST RECENT SYSTOLIC BLOOD PRESSURE < 130 MM HG: ICD-10-PCS | Mod: CPTII,,, | Performed by: STUDENT IN AN ORGANIZED HEALTH CARE EDUCATION/TRAINING PROGRAM

## 2023-03-03 PROCEDURE — 3008F PR BODY MASS INDEX (BMI) DOCUMENTED: ICD-10-PCS | Mod: CPTII,,, | Performed by: STUDENT IN AN ORGANIZED HEALTH CARE EDUCATION/TRAINING PROGRAM

## 2023-03-03 PROCEDURE — 3079F PR MOST RECENT DIASTOLIC BLOOD PRESSURE 80-89 MM HG: ICD-10-PCS | Mod: CPTII,,, | Performed by: STUDENT IN AN ORGANIZED HEALTH CARE EDUCATION/TRAINING PROGRAM

## 2023-03-03 PROCEDURE — 4010F PR ACE/ARB THEARPY RXD/TAKEN: ICD-10-PCS | Mod: CPTII,,, | Performed by: STUDENT IN AN ORGANIZED HEALTH CARE EDUCATION/TRAINING PROGRAM

## 2023-03-03 PROCEDURE — 3074F SYST BP LT 130 MM HG: CPT | Mod: CPTII,,, | Performed by: STUDENT IN AN ORGANIZED HEALTH CARE EDUCATION/TRAINING PROGRAM

## 2023-03-03 PROCEDURE — 99214 OFFICE O/P EST MOD 30 MIN: CPT | Mod: S$PBB,,, | Performed by: STUDENT IN AN ORGANIZED HEALTH CARE EDUCATION/TRAINING PROGRAM

## 2023-03-03 PROCEDURE — 3044F HG A1C LEVEL LT 7.0%: CPT | Mod: CPTII,,, | Performed by: STUDENT IN AN ORGANIZED HEALTH CARE EDUCATION/TRAINING PROGRAM

## 2023-03-03 PROCEDURE — 1159F MED LIST DOCD IN RCRD: CPT | Mod: CPTII,,, | Performed by: STUDENT IN AN ORGANIZED HEALTH CARE EDUCATION/TRAINING PROGRAM

## 2023-03-03 PROCEDURE — 4010F ACE/ARB THERAPY RXD/TAKEN: CPT | Mod: CPTII,,, | Performed by: STUDENT IN AN ORGANIZED HEALTH CARE EDUCATION/TRAINING PROGRAM

## 2023-03-03 PROCEDURE — 99214 OFFICE O/P EST MOD 30 MIN: CPT | Mod: PBBFAC | Performed by: STUDENT IN AN ORGANIZED HEALTH CARE EDUCATION/TRAINING PROGRAM

## 2023-03-03 PROCEDURE — 3044F PR MOST RECENT HEMOGLOBIN A1C LEVEL <7.0%: ICD-10-PCS | Mod: CPTII,,, | Performed by: STUDENT IN AN ORGANIZED HEALTH CARE EDUCATION/TRAINING PROGRAM

## 2023-03-03 RX ORDER — AMIODARONE HYDROCHLORIDE 200 MG/1
200 TABLET ORAL
COMMUNITY
Start: 2023-02-25 | End: 2023-11-29 | Stop reason: SDUPTHER

## 2023-03-03 RX ORDER — TRAZODONE HYDROCHLORIDE 50 MG/1
25 TABLET ORAL NIGHTLY PRN
Qty: 30 TABLET | Refills: 1 | Status: SHIPPED | OUTPATIENT
Start: 2023-03-03 | End: 2023-03-03

## 2023-03-03 RX ORDER — MIRTAZAPINE 7.5 MG/1
7.5 TABLET, FILM COATED ORAL NIGHTLY
Qty: 30 TABLET | Refills: 1 | Status: SHIPPED | OUTPATIENT
Start: 2023-03-03 | End: 2023-05-29

## 2023-03-03 NOTE — PROGRESS NOTES
Subjective:       Patient ID: Hafsa Hawley is a 57 y.o. female.    Chief Complaint: Hospital Follow Up    Pt here for hospital follow-up    Transitional Care Note    Family and/or Caretaker present at visit?  No.  Diagnostic tests reviewed/disposition: No diagnosic tests pending after this hospitalization.  Disease/illness education: CHF  Home health/community services discussion/referrals: Patient does not have home health established from hospital visit.  They do need home health.  If needed, we will set up home health for the patient. She has been unable to find a home health agency that accepts medicaid.  Establishment or re-establishment of referral orders for community resources: No other necessary community resources.   Discussion with other health care providers: No discussion with other health care providers necessary.       She is feeling a little better. She did have an episode since that time where she picked her skin and she started bleeding and had to go to ER for cauterization to get her bleeding to stop.    She was seen by endo since last visit. They are trying to get her approved for dexcom. They also started ozempic 0.25mg weekly. Her jardiance dose was decreased at recent hospital stay to 10mg daily. They also stopped her glimepiride.    She is frustrated as she has issues with parking when she goes to main campus for her transplant appts. They have told her they do not want someone pushing her around in a wheelchair, they want her ambulating. Pt states she has issues with [place] and chronic foot pain. She usually has issues finding a parking spot and has to park far from the entrance. She is having issues surrounding her transplant potential due to pulm dysfunction.    She has followed with cardiology since hospital stay as well.     Pt does reports issues with sleep. She has trouble staying and falling asleep. She tried melatonin with no improvement.    Review of Systems   Constitutional:   Negative for appetite change, chills, fatigue and fever.   HENT:  Negative for congestion and sore throat.    Respiratory:  Positive for cough (chronic) and shortness of breath (at baseline).    Cardiovascular:  Positive for leg swelling (at baseline). Negative for chest pain.   Gastrointestinal:  Positive for nausea. Negative for abdominal pain, diarrhea and vomiting.   Genitourinary:  Negative for dysuria, frequency, hematuria and urgency.   Musculoskeletal:  Positive for arthralgias and myalgias.   Neurological:  Negative for dizziness, syncope and light-headedness.   Psychiatric/Behavioral:  Positive for dysphoric mood. The patient is nervous/anxious.      Objective:      Physical Exam   Constitutional: She is oriented to person, place, and time.   HENT:   Head: Normocephalic and atraumatic.   Eyes: Conjunctivae are normal.   Cardiovascular: Normal rate, regular rhythm and normal heart sounds.   No murmur heard.  Pulmonary/Chest: Effort normal and breath sounds normal. No respiratory distress.   Musculoskeletal:      Right lower leg: No edema.      Left lower leg: No edema.   Neurological: She is alert and oriented to person, place, and time.   Psychiatric: Her behavior is normal. Mood normal.   In better spirits today; excited about the potential for tranplant   Vitals reviewed.    Assessment:       1. Hospital discharge follow-up    2. Neuropathy    3. Insomnia, unspecified type    4. Chronic respiratory failure with hypoxia and hypercapnia    5. Paroxysmal atrial fibrillation    6. Chronic combined systolic and diastolic heart failure    7. Depression due to physical illness        Plan:           1. Hospital discharge follow-up    2. Neuropathy    3. Insomnia, unspecified type  -     Discontinue: traZODone (DESYREL) 50 MG tablet; Take 0.5 tablets (25 mg total) by mouth nightly as needed for Insomnia.  Dispense: 30 tablet; Refill: 1  -     mirtazapine (REMERON) 7.5 MG Tab; Take 1 tablet (7.5 mg total) by  mouth every evening.  Dispense: 30 tablet; Refill: 1    4. Chronic respiratory failure with hypoxia and hypercapnia    5. Paroxysmal atrial fibrillation    6. Chronic combined systolic and diastolic heart failure    7. Depression due to physical illness  -     mirtazapine (REMERON) 7.5 MG Tab; Take 1 tablet (7.5 mg total) by mouth every evening.  Dispense: 30 tablet; Refill: 1    Cont current meds  Start low dose remeron for insomnia; may also with depressed mood  Follow-up cardiology and transplant as scheduled  Follow-up pulm as scheduled  RTC 2-3 months or sooner if needed

## 2023-03-06 ENCOUNTER — TELEPHONE (OUTPATIENT)
Dept: FAMILY MEDICINE | Facility: CLINIC | Age: 58
End: 2023-03-06
Payer: MEDICAID

## 2023-03-06 DIAGNOSIS — G89.29 CHRONIC FOOT PAIN, LEFT: Primary | ICD-10-CM

## 2023-03-06 DIAGNOSIS — M79.672 CHRONIC FOOT PAIN, LEFT: Primary | ICD-10-CM

## 2023-03-06 NOTE — TELEPHONE ENCOUNTER
----- Message from Frank Cook sent at 3/6/2023  2:02 PM CST -----  Contact: Patient  Hafsa Hawley  MRN: [id number]  : 1965  PCP: Cristobal Ann  Home Phone      [phone]  Work Phone      Not on file.  Mobile          [phone]  Home Phone      [phone]      MESSAGE:   Pt requesting refill or new Rx.   Is this a refill or new RX:  refill  RX name and strength: Hydrocodone 5-325 mg  Last office visit: 3/3/23  Is this a 30-day or 90-day RX:  30 day  Pharmacy name and location:  [facility]  Comments:      Phone:  [phone]    PCP: Seth

## 2023-03-06 NOTE — TELEPHONE ENCOUNTER
Pt requesting refill on HYDROcodone-acetaminophen (NORCO) 5-325 mg per tablet. Med last refilled on 01/20/23. Med currently not active in chart.     Please advise. Thanks     LOV: 03/03/23

## 2023-03-07 ENCOUNTER — PATIENT OUTREACH (OUTPATIENT)
Dept: EMERGENCY MEDICINE | Facility: HOSPITAL | Age: 58
End: 2023-03-07
Payer: MEDICAID

## 2023-03-07 RX ORDER — HYDROCODONE BITARTRATE AND ACETAMINOPHEN 5; 325 MG/1; MG/1
1 TABLET ORAL EVERY 6 HOURS PRN
Qty: 28 TABLET | Refills: 0 | Status: ON HOLD | OUTPATIENT
Start: 2023-03-07 | End: 2023-03-29 | Stop reason: HOSPADM

## 2023-03-07 NOTE — PROGRESS NOTES
Pt is unreachable. Encounter will be closed, and pt should be contacted if/when he/she comes back to the ER again for a F/U call.    Beulah Figueroa  ED Navigator- [place]  [phone]

## 2023-03-14 ENCOUNTER — LAB VISIT (OUTPATIENT)
Dept: LAB | Facility: HOSPITAL | Age: 58
End: 2023-03-14
Attending: INTERNAL MEDICINE
Payer: MEDICAID

## 2023-03-14 DIAGNOSIS — Z01.818 PRE-TRANSPLANT EVALUATION FOR HEART TRANSPLANT: ICD-10-CM

## 2023-03-14 LAB
ALBUMIN SERPL BCP-MCNC: 3.9 G/DL (ref 3.5–5.2)
ALP SERPL-CCNC: 54 U/L (ref 55–135)
ALT SERPL W/O P-5'-P-CCNC: 22 U/L (ref 10–44)
ANION GAP SERPL CALC-SCNC: 12 MMOL/L (ref 8–16)
ANISOCYTOSIS BLD QL SMEAR: ABNORMAL
AST SERPL-CCNC: 22 U/L (ref 10–40)
BASOPHILS # BLD AUTO: 0.03 K/UL (ref 0–0.2)
BASOPHILS NFR BLD: 0.6 % (ref 0–1.9)
BILIRUB SERPL-MCNC: 2.3 MG/DL (ref 0.1–1)
BUN SERPL-MCNC: 59 MG/DL (ref 6–20)
CALCIUM SERPL-MCNC: 9.9 MG/DL (ref 8.7–10.5)
CHLORIDE SERPL-SCNC: 106 MMOL/L (ref 95–110)
CO2 SERPL-SCNC: 25 MMOL/L (ref 23–29)
CREAT SERPL-MCNC: 2.7 MG/DL (ref 0.5–1.4)
DACRYOCYTES BLD QL SMEAR: ABNORMAL
DIFFERENTIAL METHOD: ABNORMAL
EOSINOPHIL # BLD AUTO: 0.1 K/UL (ref 0–0.5)
EOSINOPHIL NFR BLD: 1.6 % (ref 0–8)
ERYTHROCYTE [DISTWIDTH] IN BLOOD BY AUTOMATED COUNT: 31.6 % (ref 11.5–14.5)
EST. GFR  (NO RACE VARIABLE): 20 ML/MIN/1.73 M^2
GLUCOSE SERPL-MCNC: 137 MG/DL (ref 70–110)
HCT VFR BLD AUTO: 33.2 % (ref 37–48.5)
HGB BLD-MCNC: 9.6 G/DL (ref 12–16)
IMM GRANULOCYTES # BLD AUTO: 0.03 K/UL (ref 0–0.04)
IMM GRANULOCYTES NFR BLD AUTO: 0.6 % (ref 0–0.5)
LYMPHOCYTES # BLD AUTO: 0.8 K/UL (ref 1–4.8)
LYMPHOCYTES NFR BLD: 15.3 % (ref 18–48)
MCH RBC QN AUTO: 17.5 PG (ref 27–31)
MCHC RBC AUTO-ENTMCNC: 28.9 G/DL (ref 32–36)
MCV RBC AUTO: 61 FL (ref 82–98)
MONOCYTES # BLD AUTO: 0.4 K/UL (ref 0.3–1)
MONOCYTES NFR BLD: 8.6 % (ref 4–15)
NEUTROPHILS # BLD AUTO: 3.6 K/UL (ref 1.8–7.7)
NEUTROPHILS NFR BLD: 73.3 % (ref 38–73)
NRBC BLD-RTO: 2 /100 WBC
PLATELET # BLD AUTO: 233 K/UL (ref 150–450)
PMV BLD AUTO: ABNORMAL FL (ref 9.2–12.9)
POIKILOCYTOSIS BLD QL SMEAR: ABNORMAL
POLYCHROMASIA BLD QL SMEAR: ABNORMAL
POTASSIUM SERPL-SCNC: 4.5 MMOL/L (ref 3.5–5.1)
PROT SERPL-MCNC: 6.8 G/DL (ref 6–8.4)
RBC # BLD AUTO: 5.49 M/UL (ref 4–5.4)
SCHISTOCYTES BLD QL SMEAR: PRESENT
SODIUM SERPL-SCNC: 143 MMOL/L (ref 136–145)
TARGETS BLD QL SMEAR: ABNORMAL
WBC # BLD AUTO: 4.9 K/UL (ref 3.9–12.7)

## 2023-03-14 PROCEDURE — 85025 COMPLETE CBC W/AUTO DIFF WBC: CPT | Mod: NTX | Performed by: INTERNAL MEDICINE

## 2023-03-14 PROCEDURE — 36415 COLL VENOUS BLD VENIPUNCTURE: CPT | Mod: NTX | Performed by: INTERNAL MEDICINE

## 2023-03-14 PROCEDURE — 80053 COMPREHEN METABOLIC PANEL: CPT | Mod: NTX | Performed by: INTERNAL MEDICINE

## 2023-03-15 ENCOUNTER — OFFICE VISIT (OUTPATIENT)
Dept: TRANSPLANT | Facility: CLINIC | Age: 58
End: 2023-03-15
Attending: INTERNAL MEDICINE
Payer: MEDICAID

## 2023-03-15 ENCOUNTER — HOSPITAL ENCOUNTER (OUTPATIENT)
Dept: PULMONOLOGY | Facility: CLINIC | Age: 58
Discharge: HOME | End: 2023-03-15
Payer: MEDICAID

## 2023-03-15 VITALS
HEIGHT: 66 IN | SYSTOLIC BLOOD PRESSURE: 131 MMHG | BODY MASS INDEX: 31.39 KG/M2 | DIASTOLIC BLOOD PRESSURE: 89 MMHG | HEART RATE: 91 BPM | WEIGHT: 195.31 LBS

## 2023-03-15 DIAGNOSIS — E11.22 TYPE 2 DIABETES MELLITUS WITH STAGE 4 CHRONIC KIDNEY DISEASE, WITH LONG-TERM CURRENT USE OF INSULIN: ICD-10-CM

## 2023-03-15 DIAGNOSIS — N18.4 TYPE 2 DIABETES MELLITUS WITH STAGE 4 CHRONIC KIDNEY DISEASE, WITH LONG-TERM CURRENT USE OF INSULIN: ICD-10-CM

## 2023-03-15 DIAGNOSIS — J98.4 RESTRICTIVE LUNG DISEASE: ICD-10-CM

## 2023-03-15 DIAGNOSIS — E66.09 CLASS 1 OBESITY DUE TO EXCESS CALORIES WITHOUT SERIOUS COMORBIDITY WITH BODY MASS INDEX (BMI) OF 31.0 TO 31.9 IN ADULT: ICD-10-CM

## 2023-03-15 DIAGNOSIS — I42.8 NICM (NONISCHEMIC CARDIOMYOPATHY): Chronic | ICD-10-CM

## 2023-03-15 DIAGNOSIS — I13.0 HYPERTENSIVE CARDIOVASCULAR-RENAL DISEASE, STAGE 1-4 OR UNSPECIFIED CHRONIC KIDNEY DISEASE, WITH HEART FAILURE: ICD-10-CM

## 2023-03-15 DIAGNOSIS — Z95.810 ICD (IMPLANTABLE CARDIOVERTER-DEFIBRILLATOR) IN PLACE: Chronic | ICD-10-CM

## 2023-03-15 DIAGNOSIS — Z79.4 TYPE 2 DIABETES MELLITUS WITH STAGE 4 CHRONIC KIDNEY DISEASE, WITH LONG-TERM CURRENT USE OF INSULIN: ICD-10-CM

## 2023-03-15 DIAGNOSIS — I50.42 CHRONIC COMBINED SYSTOLIC AND DIASTOLIC HEART FAILURE: Primary | Chronic | ICD-10-CM

## 2023-03-15 DIAGNOSIS — N18.4 CKD (CHRONIC KIDNEY DISEASE), STAGE IV: Chronic | ICD-10-CM

## 2023-03-15 DIAGNOSIS — Z01.818 PRE-TRANSPLANT EVALUATION FOR HEART TRANSPLANT: ICD-10-CM

## 2023-03-15 PROCEDURE — 3008F BODY MASS INDEX DOCD: CPT | Mod: CPTII,,, | Performed by: INTERNAL MEDICINE

## 2023-03-15 PROCEDURE — 99214 OFFICE O/P EST MOD 30 MIN: CPT | Mod: S$PBB,,, | Performed by: INTERNAL MEDICINE

## 2023-03-15 PROCEDURE — 1160F PR REVIEW ALL MEDS BY PRESCRIBER/CLIN PHARMACIST DOCUMENTED: ICD-10-PCS | Mod: CPTII,,, | Performed by: INTERNAL MEDICINE

## 2023-03-15 PROCEDURE — 3079F DIAST BP 80-89 MM HG: CPT | Mod: CPTII,,, | Performed by: INTERNAL MEDICINE

## 2023-03-15 PROCEDURE — 3008F PR BODY MASS INDEX (BMI) DOCUMENTED: ICD-10-PCS | Mod: CPTII,,, | Performed by: INTERNAL MEDICINE

## 2023-03-15 PROCEDURE — 4010F PR ACE/ARB THEARPY RXD/TAKEN: ICD-10-PCS | Mod: CPTII,,, | Performed by: INTERNAL MEDICINE

## 2023-03-15 PROCEDURE — 3075F PR MOST RECENT SYSTOLIC BLOOD PRESS GE 130-139MM HG: ICD-10-PCS | Mod: CPTII,,, | Performed by: INTERNAL MEDICINE

## 2023-03-15 PROCEDURE — 4010F ACE/ARB THERAPY RXD/TAKEN: CPT | Mod: CPTII,,, | Performed by: INTERNAL MEDICINE

## 2023-03-15 PROCEDURE — 99214 PR OFFICE/OUTPT VISIT, EST, LEVL IV, 30-39 MIN: ICD-10-PCS | Mod: S$PBB,,, | Performed by: INTERNAL MEDICINE

## 2023-03-15 PROCEDURE — 3044F PR MOST RECENT HEMOGLOBIN A1C LEVEL <7.0%: ICD-10-PCS | Mod: CPTII,,, | Performed by: INTERNAL MEDICINE

## 2023-03-15 PROCEDURE — 1159F MED LIST DOCD IN RCRD: CPT | Mod: CPTII,,, | Performed by: INTERNAL MEDICINE

## 2023-03-15 PROCEDURE — 1111F DSCHRG MED/CURRENT MED MERGE: CPT | Mod: CPTII,,, | Performed by: INTERNAL MEDICINE

## 2023-03-15 PROCEDURE — 94010 BREATHING CAPACITY TEST: ICD-10-PCS | Mod: 26,S$PBB,TXP, | Performed by: INTERNAL MEDICINE

## 2023-03-15 PROCEDURE — 1111F PR DISCHARGE MEDS RECONCILED W/ CURRENT OUTPATIENT MED LIST: ICD-10-PCS | Mod: CPTII,,, | Performed by: INTERNAL MEDICINE

## 2023-03-15 PROCEDURE — 99215 OFFICE O/P EST HI 40 MIN: CPT | Mod: PBBFAC | Performed by: INTERNAL MEDICINE

## 2023-03-15 PROCEDURE — 3079F PR MOST RECENT DIASTOLIC BLOOD PRESSURE 80-89 MM HG: ICD-10-PCS | Mod: CPTII,,, | Performed by: INTERNAL MEDICINE

## 2023-03-15 PROCEDURE — 1159F PR MEDICATION LIST DOCUMENTED IN MEDICAL RECORD: ICD-10-PCS | Mod: CPTII,,, | Performed by: INTERNAL MEDICINE

## 2023-03-15 PROCEDURE — 94727 GAS DIL/WSHOT DETER LNG VOL: CPT | Mod: PBBFAC,TXP | Performed by: INTERNAL MEDICINE

## 2023-03-15 PROCEDURE — 94729 DIFFUSING CAPACITY: CPT | Mod: PBBFAC,TXP | Performed by: INTERNAL MEDICINE

## 2023-03-15 PROCEDURE — 94729 PR C02/MEMBANE DIFFUSE CAPACITY: ICD-10-PCS | Mod: 26,S$PBB,TXP, | Performed by: INTERNAL MEDICINE

## 2023-03-15 PROCEDURE — 99999 PR PBB SHADOW E&M-EST. PATIENT-LVL V: ICD-10-PCS | Mod: PBBFAC,,, | Performed by: INTERNAL MEDICINE

## 2023-03-15 PROCEDURE — 94727 GAS DIL/WSHOT DETER LNG VOL: CPT | Mod: 26,S$PBB,TXP, | Performed by: INTERNAL MEDICINE

## 2023-03-15 PROCEDURE — 3044F HG A1C LEVEL LT 7.0%: CPT | Mod: CPTII,,, | Performed by: INTERNAL MEDICINE

## 2023-03-15 PROCEDURE — 94727 PR PULM FUNCTION TEST BY GAS: ICD-10-PCS | Mod: 26,S$PBB,TXP, | Performed by: INTERNAL MEDICINE

## 2023-03-15 PROCEDURE — 99999 PR PBB SHADOW E&M-EST. PATIENT-LVL V: CPT | Mod: PBBFAC,,, | Performed by: INTERNAL MEDICINE

## 2023-03-15 PROCEDURE — 3075F SYST BP GE 130 - 139MM HG: CPT | Mod: CPTII,,, | Performed by: INTERNAL MEDICINE

## 2023-03-15 PROCEDURE — 94010 BREATHING CAPACITY TEST: CPT | Mod: 26,S$PBB,TXP, | Performed by: INTERNAL MEDICINE

## 2023-03-15 PROCEDURE — 1160F RVW MEDS BY RX/DR IN RCRD: CPT | Mod: CPTII,,, | Performed by: INTERNAL MEDICINE

## 2023-03-15 PROCEDURE — 94729 DIFFUSING CAPACITY: CPT | Mod: 26,S$PBB,TXP, | Performed by: INTERNAL MEDICINE

## 2023-03-15 PROCEDURE — 94010 BREATHING CAPACITY TEST: CPT | Mod: PBBFAC,TXP | Performed by: INTERNAL MEDICINE

## 2023-03-15 RX ORDER — TRAZODONE HYDROCHLORIDE 50 MG/1
25 TABLET ORAL NIGHTLY PRN
Status: ON HOLD | COMMUNITY
Start: 2023-03-03 | End: 2023-06-30 | Stop reason: HOSPADM

## 2023-03-15 NOTE — PROGRESS NOTES
Subjective:   Transplant status: declined    HPI:  Ms. Hawley is a 57 y.o. year old Black or  female who has presented to be evaluated as a potential heart transplant recipient.      56 yo BF with CHF since 2013 due to NICM, pulmonary hypertension, DM, HTN, permanent AF and ICD.  She was previously evaluated by our team and presented to committee on 3/9/22.  At the time she was declined for OHT or LVAD due to renal function, lung function and difficulty consistently following up with the team.   She returns for f/u visit as undergoing evaluation for heart-kidney transplant.  She does not have her medications with her.  She is not have a medication list of her home medicines with her.  She does not know any of her medications by name except Lasix.  Was described as Lasix 80 mg twice daily but she reports she takes the morning dose but only use the evening dose if she feels the need to do so.  I asked her how she knows without a scale she said she just bases this on her abdominal bloating and any edema.  She reports that her scale is broken and she has not gotten a new 1 so she has no daily weights to report.  She is accompanied by her granddaughter.  She tells me if she was approved for transplant her primary caregiver will be Jeanie Jaimes (sister and Laura Jaimes her niece.  She is going to stay in Laura's house after her transplant, if approved.    She saw Dr. Guardado in January 2023 at which time sent to the ED and admitted to [facility] medicine 1/3/23.  She was treated for ADHF with IV lasix 80 mg IV BID and HTS consulted.  At that time it was noted that she had run out of metoprolol for 1 week PTA.  She was readmitted to [facility] few weeks later with ADHF and at discharge told to see HTS.  She came Feb 3, 2023 to see Dr. Guardado but was sent to ED with nausea and vomiting.  There treated with IV lasix and sent home from ED.  She comes today for f/u visit.  Of note per Dr. Guardado she was seen by Pulm in Jan  2023 and they felt that there was no overt evidence of ILD or COPD on PFTs and CT chest.  They felt that the etiology of her chronic hypoxic and hypercapnic respiratory failure was not clear.     I saw her February 16, 2023 at which time she reported that her shortness of breath was stable and described NYHA class 3 symptoms.  At this visit I was very concerned that her lung disease is her limiting factor based upon the desaturation with exercise not just today but also on the day of her CPX.  On the CPX in December her breathing reserve was borderline reduced.  An elevated VCO2 slope can occur with lung disease as well as heart failure.  I suspect she has a combination of lung disease and heart failure that is limiting her ability to be physically active and resulting in her high level of symptomology.  I note on her problem list a diagnosis of chronic respiratory hypoxic and hypercapnic respiratory failure.  I did not see a blood gas to confirm this diagnosis so requested that one be obtained.    She saw Dr. Chacko February 27, 2023 note not yet signed but at end of current note he mentions his concern regarding PFT's.  On 2/27/2023 PFT's read as reduced FEV1/SVC ratio (66%) and low FEF 25-75 suggest mild obstruction on spirometry. Lung volumes show moderate restriction is present. Overall spirometry shows severe ventilatory impairment. DLCO is moderately decreased. MVV is severely decreased. On 2/27/2023 ABG 7.46/pCO2 was 39 and pO2 was 73 and these ABG's do not reflect the previously described chronic hypoxic and hypercapnic respiratory failure.    She sees Dr. Ángel Elizabeth as Pulm at [facility].  His notes describe on PFTs December 22, 2021 severe restriction with moderately reduced DLCO.  He stated on his note of December 8, 2022 that she was on home oxygen felt she was benefitting from it.  However he does concludes that there is no evidence of ILD or COPD on PFTs/CT chest.   On August 1, 2022 he documented in an  "addendum that was signed March 24, 2022 that the patient had chronic respiratory failure with hypoxia and hypercapnia:  Patient is on home oxygen and is benefitting from it.   I found a note from February 16, 2022 under assessment he describes chronic respiratory failure with hypoxia and hypercapnia.  He reports that she was trying to wean herself off oxygen."    She returns today still not clear on her medications.  Still no weight log.  So no accurate list of medications other than what we have in the system here.  She is not very interactive at her visit today her  answers most of the questions.    Her PFTs today 03/15/2023 demonstrates severe restriction and severely reduced MVV there is also the possibility of mild obstruction per report.  In February 27,2023 she has a blood gas reportedly on room air that is normal without CO2 retention (7.46/39/73).    Past Medical History:   Diagnosis Date    Anemia     Arthritis     Atrial fibrillation     OAC    Chronic respiratory failure with hypoxia, on home oxygen therapy     2L with activity, off at rest.  Per Pulm  no overt evidence of ILD or COPD on PFTs and CT to explain O2 needs.    CKD (chronic kidney disease), stage IV 05/08/2018    Congestive heart failure     s/p AICD placement,    Deep vein thrombosis     Depression     elevated bilirubin d/t Gilbert's syndrome     confirmed by [place] genetic testing, evaluated by hepatology    Encounter for blood transfusion     GERD (gastroesophageal reflux disease)     Hypertension     Pheochromocytoma, malignant     Right kidney mass     Sleep apnea     Thalassemia trait, alpha     Thyroid disease     Type 2 diabetes mellitus with hyperglycemia, without long-term current use of insulin 08/13/2020     Past Surgical History:   Procedure Laterality Date    APPENDECTOMY      BONE MARROW BIOPSY      CARDIAC DEFIBRILLATOR PLACEMENT Left 12/2016    CARDIAC ELECTROPHYSIOLOGY MAPPING AND ABLATION      CARDIAC " ELECTROPHYSIOLOGY MAPPING AND ABLATION      COLONOSCOPY N/A 5/6/2022    Procedure: COLONOSCOPY;  Surgeon: Arely Betancourt MD;  Location: [facility] (2ND FLR);  Service: Endoscopy;  Laterality: N/A;  heart transplant candidate/ EF 25% - 2nd floor/ defib - Biotronik - ERW  Eliquis - per Dr. Cortez with [facility], Pt ok to hold Eliquis x 2 days prior-see media tab-outside correspondence dated 12/30/21  - ERW  verbal instructions/portal instructions/email instructions - s    EYE SURGERY      due to running tears    FRACTURE SURGERY Left     hand 5th digit    HYSTERECTOMY      KNEE SURGERY Left 2016    hematoma    LIVER BIOPSY  10/24/2018    Minimal steatosis, predominantly macrovesicular, 1%, Minimal nonspecific portal inflammation, no fibrosis. No findings on biopsy to explain elevated bilirubin levels. Could be d/t Gilbert's =?- hemolysis    RIGHT HEART CATHETERIZATION Right 12/07/2021    Procedure: INSERTION, CATHETER, RIGHT HEART;  Surgeon: Irving Cardenas MD;  Location: [facility] CATH LAB;  Service: Cardiology;  Laterality: Right;    RIGHT HEART CATHETERIZATION Right 12/19/2022    Procedure: INSERTION, CATHETER, RIGHT HEART;  Surgeon: Burke Camilo MD;  Location: [facility] CATH LAB;  Service: Cardiology;  Laterality: Right;    TRANSJUGULAR BIOPSY OF LIVER N/A 10/24/2018    Procedure: BIOPSY, LIVER, TRANSJUGULAR APPROACH;  Surgeon: Carmen Diagnostic Provider;  [facility];  Service: Radiology;  Laterality: N/A;       Review of Systems   Constitutional: Negative for chills and fever.   HENT:  Negative for hearing loss.    Eyes:  Negative for visual disturbance.   Cardiovascular:  Positive for dyspnea on exertion. Negative for chest pain, irregular heartbeat, leg swelling, orthopnea, palpitations, paroxysmal nocturnal dyspnea and syncope.   Respiratory:  Positive for cough and shortness of breath.    Musculoskeletal:  Negative for muscle weakness.   Gastrointestinal:  Positive for nausea and vomiting.  "Negative for diarrhea.   Neurological:  Negative for focal weakness.   Psychiatric/Behavioral:  Negative for memory loss.      Objective:   Blood pressure 131/89, pulse 91, height 5' 6" (1.676 m), weight 88.6 kg (195 lb 5.2 oz), last menstrual period 10/23/2001.body mass index is 31.53 kg/m².  Physical Exam  Constitutional:       Appearance: She is obese.      Comments: /89 (BP Location: Left arm, Patient Position: Sitting, BP Method: Medium (Automatic))   Pulse 91   Ht 5' 6" (1.676 m)   Wt 88.6 kg (195 lb 5.2 oz)   LMP 10/23/2001   BMI 31.53 kg/m²   Last visit wt 203#     HENT:      Head: Atraumatic.   Eyes:      General: No scleral icterus.        Right eye: No discharge.         Left eye: No discharge.   Neck:      Vascular: No carotid bruit.      Comments: Fat pad present makes it difficult to assess JVD  Cardiovascular:      Rate and Rhythm: Normal rate and regular rhythm.      Pulses: Normal pulses.      Heart sounds: Normal heart sounds.      Comments: JVP 12 cm.  Pulmonary:      Breath sounds: Normal breath sounds.   Abdominal:      General: Bowel sounds are normal.      Palpations: Abdomen is soft. There is no mass.      Tenderness: There is no abdominal tenderness.   Musculoskeletal:         General: Normal range of motion.      Right lower leg: No edema.      Left lower leg: No edema.   Neurological:      General: No focal deficit present.      Mental Status: She is alert and oriented to person, place, and time.   Psychiatric:      Comments: She is somewhat withdrawn.  Does not interact well.  Does not know her medications.  Relies on her  to answer questions though she will answer when I persist the answers are just a couple of words     Lab Results   Component Value Date    BNP 4,274 (H) 02/20/2023    BNP 2,470 (H) 01/30/2023     (H) 01/06/2023     Lab Results   Component Value Date    BNP 4,274 (H) 02/20/2023     03/14/2023    K 4.5 03/14/2023    MG 1.9 02/21/2023    CL " 106 03/14/2023    CO2 25 03/14/2023    PHOS 3.7 02/21/2023    BUN 59 (H) 03/14/2023    CREATININE 2.7 (H) 03/14/2023     (H) 03/14/2023    HGBA1C 4.6 01/30/2023    AST 22 03/14/2023    ALT 22 03/14/2023    ALBUMIN 3.9 03/14/2023    PROT 6.8 03/14/2023    BILITOT 2.3 (H) 03/14/2023    WBC 4.90 03/14/2023    HGB 9.6 (L) 03/14/2023    HCT 33.2 (L) 03/14/2023    HCT 45 12/07/2021     03/14/2023    INR 1.0 01/04/2023     (H) 01/04/2023    TSH 1.407 01/04/2023    F2ICISA 8.4 09/11/2017    FREET4 1.07 05/09/2018    CHOL 136 01/04/2023    HDL 63 01/04/2023    LDLCALC 57.8 (L) 01/04/2023    TRIG 76 01/04/2023 2/27/2023 PFT's read as reduced FEV1/SVC ratio (66%) and low FEF 25-75 suggest mild obstruction on spirometry. Lung volumes show moderate restriction is present. Overall spirometry shows severe ventilatory impairment. DLCO is moderately decreased. MVV is severely decreased.     02/16/2023 6 minute walk test on room air she walked 244 m.  Resting saturation 92% after exercise 88% recovery 93%.  She stopped for chest pain, shortness of breath and dizziness.  Her Haseeb scale was 3 at rest and 7-8 post exercise.    02/16/2023 6 minute walk test on oxygen 2 liters/minute she walked 30.5 m oxygen saturation at rest 96% post exercise 97% recovery 94%.  She stopped for shortness of breath.    CPX 12/19/22  Resting spirometry reveals an FVC = 1.47L which is 44.69% of predicted, an FEV1 of 1.03L, which is 39.09% of predicted and an FEV1/FVC ratio of 70.07%. The MVV = 41.2 L/min, which is 42.35% of predicted.     The respiratory exchange ratio (RER) was 0.87, suggesting poor effort.     The breathing reserve is calculated at 21.36%, which is borderline reduced. Oxygen saturation with exercise  became reduced reaching a ruth ann of 88% from a baseline value of 90%.     The peak VO2 was 10.1 ml/kg/min which is 33.44% of predicted equating to a functional capacity of 2.89 METS indicating severe functional  impairment.     The anaerobic threshold was not attained.     The peak VO2 Lean was 12.58 ml/kg of lean body weight/min indicating a poor prognosis in heart failure.     The VE/VCO2 [place] was 48.1. The Resting PetCO2 was 27.0.     Severe functional impairment associated with a borderline reduced breathing reserve, reduced oxygen saturation with exercise, poor effort. These findings are indicative of functional impairment secondary to poor effort.    RHC 12/19/22    RA 13 PA 78/40 (52)  PCWP 26    CO 4.7 l/min  CI 2.3 l/min/m2    PVR 5.5 BOLDEN.    SVR 1333 dynes/cm5.s    12/9/2022 Device check  Presenting egram demonstrates: AS/VS   Autocapture Algorithms: off   Device fxn WNL   RA pacing  %, V pacing  0%   Atrial arrhythmias: x1 SVT episode, max duration 43 seconds   Ventricular arrhythmias: none   Battery Longevity/Status: MOS1/53%   Resume remote transmission in 3 months.   Return to device clinic in April 2023 12/22/2021 PFT  Spirometry shows a reduced vital capacity suggesting restriction. Lung volumes show severe restriction is present. DLCO is moderately decreased.    Assessment:      1. Chronic combined systolic and diastolic heart failure    2. NICM (nonischemic cardiomyopathy)    3. Hypertensive cardiovascular-renal disease, stage 1-4 or unspecified chronic kidney disease, with heart failure    4. CKD (chronic kidney disease), stage IV    5. Restrictive lung disease    6. ICD (implantable cardioverter-defibrillator) in place    7. Type 2 diabetes mellitus with stage 4 chronic kidney disease, with long-term current use of insulin    8. Class 1 obesity due to excess calories without serious comorbidity with body mass index (BMI) of 31.0 to 31.9 in adult      All diagnosis listed above were specifically reviewed by me with treatments continued as prior or changed as reflected in the plan of this note.      Plan:   She is going to see Dr. Ángel Dash soon and asked her to have him forward his note.  I am  reassured by our blood gas that there was nothing to suggest chronic hypoxic respiratory failure.  Her PFTs remain of concern and I did not perform a 6 minute walk test to see if she becomes hypoxemic.  I remain concerned over her participation in her care I do not think that she is a suitable candidate for advanced options.    Patient is now NYHA III  Recommend 2 gram sodium restriction and 1500cc fluid restriction.  Encourage physical activity with graded exercise program.  Requested patient to weigh themselves daily, and to notify us if their weight increases by more than 3 lbs in 1 day or 5 lbs in 1 week.     Listed for transplant: No    Patient advised that it is recommended that all transplant candidates, and their close contacts and household members receive Covid vaccination.    UNOS Patient Status  Functional Status: 70% - Cares for self: unable to carry on normal activity or active work  Physical Capacity: No Limitations  Working for Income: No  If no, reason not working: Disability

## 2023-03-15 NOTE — PATIENT INSTRUCTIONS
When you see Dr. Epperson at the end of this month please ask him to send your clinic note and clearance for a thoracotomy to our office

## 2023-03-15 NOTE — LETTER
May 10, 2023        Krysta Tony  [address]  [facility] [address]  Phone: [phone]  Fax: [phone]             [id number]  [address]  [facility] [address]  Phone: [phone]   Patient: Hafsa Hawley   MR Number: [id number]   YOB: 1965   Date of Visit: 3/15/2023       Dear Dr. Krysta Tony    Thank you for referring Hafsa Hawley to me for evaluation. Attached you will find relevant portions of my assessment and plan of care.    If you have questions, please do not hesitate to call me. I look forward to following Hafsa Hawley along with you.    Sincerely,    Jake Orozco Jr, MD    Enclosure    If you would like to receive this communication electronically, please contact [email or web address] or [phone] to request [employer] access.    [employer] is a tool which provides read-only access to select patient information with whom you have a relationship. Its easy to use and provides real time access to review your patients record including encounter summaries, notes, results, and demographic information.    If you feel you have received this communication in error or would no longer like to receive these types of communications, please e-mail [email or web address]

## 2023-03-15 NOTE — PROGRESS NOTES
Pt had labs completed 3-14 and is scheduled to see Dr Orozco in Clinic on 3-15 after completing PFT.  Pulm records sent to Dr Orozco for review.

## 2023-03-17 ENCOUNTER — OFFICE VISIT (OUTPATIENT)
Dept: CARDIOLOGY | Facility: CLINIC | Age: 58
End: 2023-03-17
Payer: MEDICAID

## 2023-03-17 VITALS
HEIGHT: 66 IN | HEART RATE: 85 BPM | RESPIRATION RATE: 16 BRPM | DIASTOLIC BLOOD PRESSURE: 92 MMHG | BODY MASS INDEX: 31.67 KG/M2 | OXYGEN SATURATION: 89 % | WEIGHT: 197.06 LBS | SYSTOLIC BLOOD PRESSURE: 124 MMHG

## 2023-03-17 DIAGNOSIS — I48.0 PAROXYSMAL ATRIAL FIBRILLATION: Chronic | ICD-10-CM

## 2023-03-17 DIAGNOSIS — I42.8 NICM (NONISCHEMIC CARDIOMYOPATHY): Chronic | ICD-10-CM

## 2023-03-17 DIAGNOSIS — I10 ESSENTIAL HYPERTENSION: Primary | Chronic | ICD-10-CM

## 2023-03-17 DIAGNOSIS — I50.42 CHRONIC COMBINED SYSTOLIC AND DIASTOLIC HEART FAILURE: Chronic | ICD-10-CM

## 2023-03-17 DIAGNOSIS — G47.33 OSA (OBSTRUCTIVE SLEEP APNEA): Chronic | ICD-10-CM

## 2023-03-17 DIAGNOSIS — R94.31 PROLONGED Q-T INTERVAL ON ECG: ICD-10-CM

## 2023-03-17 DIAGNOSIS — Z95.810 ICD (IMPLANTABLE CARDIOVERTER-DEFIBRILLATOR) IN PLACE: Chronic | ICD-10-CM

## 2023-03-17 DIAGNOSIS — I51.7 LEFT VENTRICULAR HYPERTROPHY: Chronic | ICD-10-CM

## 2023-03-17 DIAGNOSIS — R79.89 ELEVATED TROPONIN: ICD-10-CM

## 2023-03-17 DIAGNOSIS — I13.0 HYPERTENSIVE CARDIOVASCULAR-RENAL DISEASE, STAGE 1-4 OR UNSPECIFIED CHRONIC KIDNEY DISEASE, WITH HEART FAILURE: ICD-10-CM

## 2023-03-17 DIAGNOSIS — I24.89 DEMAND ISCHEMIA: ICD-10-CM

## 2023-03-17 DIAGNOSIS — I47.10 PAROXYSMAL SUPRAVENTRICULAR TACHYCARDIA: Chronic | ICD-10-CM

## 2023-03-17 DIAGNOSIS — I47.29 NSVT (NONSUSTAINED VENTRICULAR TACHYCARDIA): ICD-10-CM

## 2023-03-17 PROCEDURE — 1111F PR DISCHARGE MEDS RECONCILED W/ CURRENT OUTPATIENT MED LIST: ICD-10-PCS | Mod: CPTII,NTX,, | Performed by: NURSE PRACTITIONER

## 2023-03-17 PROCEDURE — 3080F DIAST BP >= 90 MM HG: CPT | Mod: CPTII,NTX,, | Performed by: NURSE PRACTITIONER

## 2023-03-17 PROCEDURE — 3044F HG A1C LEVEL LT 7.0%: CPT | Mod: CPTII,NTX,, | Performed by: NURSE PRACTITIONER

## 2023-03-17 PROCEDURE — 3074F SYST BP LT 130 MM HG: CPT | Mod: CPTII,NTX,, | Performed by: NURSE PRACTITIONER

## 2023-03-17 PROCEDURE — 4010F PR ACE/ARB THEARPY RXD/TAKEN: ICD-10-PCS | Mod: CPTII,NTX,, | Performed by: NURSE PRACTITIONER

## 2023-03-17 PROCEDURE — 4010F ACE/ARB THERAPY RXD/TAKEN: CPT | Mod: CPTII,NTX,, | Performed by: NURSE PRACTITIONER

## 2023-03-17 PROCEDURE — 99214 OFFICE O/P EST MOD 30 MIN: CPT | Mod: S$PBB,NTX,, | Performed by: NURSE PRACTITIONER

## 2023-03-17 PROCEDURE — 99999 PR PBB SHADOW E&M-EST. PATIENT-LVL V: ICD-10-PCS | Mod: PBBFAC,TXP,, | Performed by: NURSE PRACTITIONER

## 2023-03-17 PROCEDURE — 3008F PR BODY MASS INDEX (BMI) DOCUMENTED: ICD-10-PCS | Mod: CPTII,NTX,, | Performed by: NURSE PRACTITIONER

## 2023-03-17 PROCEDURE — 3080F PR MOST RECENT DIASTOLIC BLOOD PRESSURE >= 90 MM HG: ICD-10-PCS | Mod: CPTII,NTX,, | Performed by: NURSE PRACTITIONER

## 2023-03-17 PROCEDURE — 3074F PR MOST RECENT SYSTOLIC BLOOD PRESSURE < 130 MM HG: ICD-10-PCS | Mod: CPTII,NTX,, | Performed by: NURSE PRACTITIONER

## 2023-03-17 PROCEDURE — 99999 PR PBB SHADOW E&M-EST. PATIENT-LVL V: CPT | Mod: PBBFAC,TXP,, | Performed by: NURSE PRACTITIONER

## 2023-03-17 PROCEDURE — 99214 PR OFFICE/OUTPT VISIT, EST, LEVL IV, 30-39 MIN: ICD-10-PCS | Mod: S$PBB,NTX,, | Performed by: NURSE PRACTITIONER

## 2023-03-17 PROCEDURE — 1159F MED LIST DOCD IN RCRD: CPT | Mod: CPTII,NTX,, | Performed by: NURSE PRACTITIONER

## 2023-03-17 PROCEDURE — 1111F DSCHRG MED/CURRENT MED MERGE: CPT | Mod: CPTII,NTX,, | Performed by: NURSE PRACTITIONER

## 2023-03-17 PROCEDURE — 1159F PR MEDICATION LIST DOCUMENTED IN MEDICAL RECORD: ICD-10-PCS | Mod: CPTII,NTX,, | Performed by: NURSE PRACTITIONER

## 2023-03-17 PROCEDURE — 3008F BODY MASS INDEX DOCD: CPT | Mod: CPTII,NTX,, | Performed by: NURSE PRACTITIONER

## 2023-03-17 PROCEDURE — 3044F PR MOST RECENT HEMOGLOBIN A1C LEVEL <7.0%: ICD-10-PCS | Mod: CPTII,NTX,, | Performed by: NURSE PRACTITIONER

## 2023-03-17 PROCEDURE — 99215 OFFICE O/P EST HI 40 MIN: CPT | Mod: PBBFAC,NTX | Performed by: NURSE PRACTITIONER

## 2023-03-17 RX ORDER — CHLORTHALIDONE 25 MG/1
25 TABLET ORAL DAILY PRN
Qty: 30 TABLET | Refills: 11 | Status: ON HOLD | OUTPATIENT
Start: 2023-03-17 | End: 2023-04-27 | Stop reason: HOSPADM

## 2023-03-17 NOTE — PROGRESS NOTES
Cardiology Clinic note    Subjective:   Patient ID:  Hafsa Hawley is a 57 y.o. female who presents for ADHF, HFrEF, NICM, A-Fib on anticoagulation, HLD, HTN, ICD    HPI:   Hafsa Hawley  has a past medical history of Anemia, Arthritis, Atrial fibrillation, Chronic respiratory failure with hypoxia, on home oxygen therapy, CKD (chronic kidney disease), stage IV (05/08/2018), Congestive heart failure, Deep vein thrombosis, Depression, elevated bilirubin d/t Gilbert's syndrome, Encounter for blood transfusion, GERD (gastroesophageal reflux disease), Hypertension, Pheochromocytoma, malignant, Right kidney mass, Sleep apnea, Thalassemia trait, alpha, Thyroid disease, and Type 2 diabetes mellitus with hyperglycemia, without long-term current use of insulin (08/13/2020).    Had hospital stay 2/2023  Presented to the ED with hypertensive urgency and sinus tachycardia.  She has advanced heart failure with EF 20%.  She has frequent hospitalizations for heart failure.  She has a defibrillator.  It has not fired in the past.  She has paroxysmal AFib and takes Eliquis.  In the ED leg swelling and jugular venous distention are documented.  She was diuresed and discharged w/ furosemide 80 mg 2 times daily.     She recently saw the transplant team.  She was converted to Bumex. ; some questionable compliance. She takes Entresto and Toprol.  She had not been checking her blood pressures at home.  She has sleep apnea but no machine.  She has chest pain at times.  There is documentation of nonischemic cardiomyopathy.  She did have troponins as elevated as 0.6 ng/ml. Her BNP was greater than 4000 on admission.     Reviewing the transplant consultation evaluation there are issues surrounding persistent pulmonary dysfunction.  There is close follow-up scheduled in the near future.  She has chronic [place] heart Association functional capacity 3 status.       She presents for follow-up and denies any worsening shortness of  breath, chest pain, dyspnea on exertion. Uses portable O2 as needed, resting sats upper 80s.  She reports compliance with diuretic therapy and cardiac meds.  At last visit, it was recommended to take daily weights and blood work to check kidney function. She has not started daily weights and did not check BMP.    Patient tearful, avoids eye contract. Concerns for depression given her condition.      She was recently seen by cardiothoracic surgery and the heart failure team 3/15/2023.  She was declined for OHT or LVAD due to renal function, lung function and difficulty consistently following up with team but this is being re-evaluated.  Some med compliance concerns. She has had multiple heart failure exacerbations.    Was referred to pulm at last visit     Of note per Dr. Guardado she was seen by Pulm in Jan 2023 and they felt that there was no overt evidence of ILD or COPD on PFTs and CT chest.  They felt that the etiology of her chronic hypoxic and hypercapnic respiratory failure was not clear.      Patient Active Problem List    Diagnosis Date Noted    Aortic atherosclerosis 02/22/2023    Pulmonary hypertension 02/22/2023    Class 1 obesity due to excess calories without serious comorbidity in adult 02/22/2023    Pulmonary HTN 01/06/2023    Demand ischemia 01/04/2023    Prolonged Q-T interval on ECG 11/16/2022    NSVT (nonsustained ventricular tachycardia) 11/02/2022    Stage 3b chronic kidney disease 08/23/2022    Restrictive lung disease 04/26/2022    Hypertensive cardiovascular-renal disease, stage 1-4 or unspecified chronic kidney disease, with heart failure 03/04/2022    Elevated serum immunoglobulin free light chains 02/23/2022    Muscle twitch 11/02/2021    DVT prophylaxis 05/17/2021    Shortness of breath 05/16/2021    Chronic respiratory failure with hypoxia and hypercapnia 03/16/2021    Type 2 diabetes mellitus with stage 4 chronic kidney disease, with long-term current use of insulin 08/13/2020     Leukopenia 08/13/2020    Screen for colon cancer 03/29/2019    elevated bilirubin d/t Gilbert's syndrome      confirmed by [place] genetic testing, evaluated by hepatology      Paroxysmal supraventricular tachycardia 08/20/2018    ICD (implantable cardioverter-defibrillator) in place 07/24/2018    CKD (chronic kidney disease), stage IV 05/08/2018    Depression due to physical illness 05/08/2018    Left ventricular hypertrophy     Acute on chronic combined systolic and diastolic heart failure 03/16/2018    Lumbar degenerative disc disease 06/07/2017    Adrenal mass 1 cm to 4 cm in diameter 04/19/2017    NICM (nonischemic cardiomyopathy) 10/27/2016    Paroxysmal atrial fibrillation 08/25/2016    MELISSA (obstructive sleep apnea) 08/23/2016    Essential hypertension 07/22/2016    Elevated troponin 07/20/2016    Microcytic anemia 07/20/2016       Patient's Medications   New Prescriptions    CHLORTHALIDONE (HYGROTEN) 25 MG TAB    Take 1 tablet (25 mg total) by mouth daily as needed.   Previous Medications    ALBUTEROL-IPRATROPIUM (DUO-NEB) 2.5 MG-0.5 MG/3 ML NEBULIZER SOLUTION    Take 3 mLs by nebulization 2 (two) times a day.    ALLOPURINOL (ZYLOPRIM) 100 MG TABLET    Take 1 tablet (100 mg total) by mouth daily as needed (gout attack).    ALPRAZOLAM (XANAX) 2 MG TAB    Take 0.25-2 mg by mouth 2 (two) times daily as needed (anxiety).    AMIODARONE (PACERONE) 200 MG TAB    Take 200 mg by mouth.    APIXABAN (ELIQUIS) 5 MG TAB    Take 1 tablet (5 mg total) by mouth 2 (two) times daily.    ATORVASTATIN (LIPITOR) 40 MG TABLET    Take 1 tablet (40 mg total) by mouth every evening.    B COMPLEX VITAMINS TABLET    Take 1 tablet by mouth once daily.    BLOOD SUGAR DIAGNOSTIC (ACCU-CHEK GUIDE TEST STRIPS) STRP    1 strip by Other route 3 (three) times daily.    BUMETANIDE (BUMEX) 2 MG TABLET    Take 2 tablets (4 mg total) by mouth 2 (two) times a day.    CETIRIZINE (ZYRTEC) 10 MG TABLET    Take 10 mg by mouth daily as needed for  Allergies.    DICLOFENAC SODIUM (VOLTAREN) 1 % GEL    APPLY 2 G TOPICALLY 3 (THREE) TIMES DAILY AS NEEDED (PAIN).    EMPAGLIFLOZIN (JARDIANCE) 10 MG TABLET    Take 1 tablet (10 mg total) by mouth once daily.    ERGOCALCIFEROL (ERGOCALCIFEROL) 50,000 UNIT CAP    Take 1 capsule (50,000 Units total) by mouth every 7 days.    FERROUS SULFATE 325 (65 FE) MG EC TABLET    Take 1 tablet (325 mg total) by mouth once daily.    FLUTICASONE PROPIONATE (FLONASE) 50 MCG/ACTUATION NASAL SPRAY    2 sprays by Each Nostril route daily as needed for Rhinitis.     HYDROCODONE-ACETAMINOPHEN (NORCO) 5-325 MG PER TABLET    Take 1 tablet by mouth every 6 (six) hours as needed for Pain.    ISOSORBIDE DINITRATE (ISORDIL) 30 MG TAB    Take 1 tablet (30 mg total) by mouth 3 (three) times daily.    LANCETS (ACCU-CHEK SOFTCLIX LANCETS) MISC    1 Device by Misc.(Non-Drug; Combo Route) route 3 (three) times daily.    LIDOCAINE (LIDODERM) 5 %    Place 1 patch onto the skin daily as needed (pain). Remove & Discard patch within 12 hours or as directed by MD    METOPROLOL SUCCINATE (TOPROL-XL) 50 MG 24 HR TABLET    Take 1 tablet (50 mg total) by mouth once daily.    MIRTAZAPINE (REMERON) 7.5 MG TAB    Take 1 tablet (7.5 mg total) by mouth every evening.    MOMETASONE-FORMOTEROL (DULERA) 200-5 MCG/ACTUATION INHALER    Inhale 2 puffs into the lungs 2 (two) times daily. Controller    NITROSTAT 0.4 MG SL TABLET    Take 2.5 tablets (1 mg total) by mouth every 5 (five) minutes as needed for Chest pain. No more than 3 tablets in 15 minutes.    ONDANSETRON (ZOFRAN-ODT) 8 MG TBDL    Take 1 tablet (8 mg total) by mouth every 12 (twelve) hours as needed (nausea).    PANTOPRAZOLE (PROTONIX) 40 MG TABLET    TAKE 1 TABLET BY MOUTH DAILY IN THE MORNING    POTASSIUM CHLORIDE (MICRO-K) 10 MEQ CPSR    Take 1 capsule (10 mEq total) by mouth once daily.    PREGABALIN (LYRICA) 25 MG CAPSULE    Take 1 capsule (25 mg total) by mouth 2 (two) times daily.     "PROMETHAZINE-CODEINE 6.25-10 MG/5 ML (PHENERGAN WITH CODEINE) 6.25-10 MG/5 ML SYRUP    TAKE 5 mLs BY MOUTH EVERY 4 - 6 HOURS AS NEEDED    SACUBITRIL-VALSARTAN (ENTRESTO) 49-51 MG PER TABLET    Take 1 tablet by mouth 2 (two) times daily.    SCOPOLAMINE (TRANSDERM-SCOP) 1.3-1.5 MG (1 MG OVER 3 DAYS)    Place 1 patch onto the skin every 72 hours as needed (nausea).    SEMAGLUTIDE (OZEMPIC) 0.25 MG OR 0.5 MG(2 MG/1.5 ML) PEN INJECTOR    Take 0.25 mg for 4 weeks, then increase to 0.5 mg weekly    TIOTROPIUM BROMIDE (SPIRIVA RESPIMAT) 1.25 MCG/ACTUATION INHALER    Inhale 2 puffs into the lungs once daily. Controller    TRAZODONE (DESYREL) 50 MG TABLET    Take 25 mg by mouth nightly as needed.    VENTOLIN HFA 90 MCG/ACTUATION INHALER    Inhale 2 puffs into the lungs every 6 (six) hours as needed for Shortness of Breath or Wheezing.   Modified Medications    No medications on file   Discontinued Medications    No medications on file        ROS      Objective:   Vitals  Vitals:    03/17/23 1150   BP: (!) 124/92   Pulse: 85   Resp: 16   SpO2: (!) 89%   Weight: 89.4 kg (197 lb 1.5 oz)   Height: 5' 6" (1.676 m)          Physical Exam      Lab Results    Lab Results   Component Value Date    WBC 4.90 03/14/2023    HGB 9.6 (L) 03/14/2023    HCT 33.2 (L) 03/14/2023    HCT 45 12/07/2021    MCV 61 (L) 03/14/2023       Lab Results   Component Value Date     03/14/2023    INR 1.0 01/04/2023       Lab Results   Component Value Date    K 4.5 03/14/2023    MG 1.9 02/21/2023    BUN 59 (H) 03/14/2023    CREATININE 2.7 (H) 03/14/2023       Lab Results   Component Value Date     (H) 03/14/2023    HGBA1C 4.6 01/30/2023       Lab Results   Component Value Date    AST 22 03/14/2023    ALT 22 03/14/2023    ALBUMIN 3.9 03/14/2023    PROT 6.8 03/14/2023       Lab Results   Component Value Date    CHOL 136 01/04/2023    HDL 63 01/04/2023    LDLCALC 57.8 (L) 01/04/2023    TRIG 76 01/04/2023       Lab Results   Component Value Date    " CRP 0.5 01/04/2023    BNP 4,274 (H) 02/20/2023       Assessment:     Problem List Items Addressed This Visit          Cardiac/Vascular    Essential hypertension - Primary (Chronic)    Paroxysmal atrial fibrillation (Chronic)    NICM (nonischemic cardiomyopathy) (Chronic)    Acute on chronic combined systolic and diastolic heart failure (Chronic)    Left ventricular hypertrophy (Chronic)    ICD (implantable cardioverter-defibrillator) in place (Chronic)    Paroxysmal supraventricular tachycardia (Chronic)    Elevated troponin    Hypertensive cardiovascular-renal disease, stage 1-4 or unspecified chronic kidney disease, with heart failure    NSVT (nonsustained ventricular tachycardia)    Prolonged Q-T interval on ECG    Demand ischemia       Other    MELISSA (obstructive sleep apnea) (Chronic)       Plan:     Condition stable at this time.     Get outstanding labs  Long discussion for medication compliance  PRN loop added for weight gain >2-3 lbs overnight    Underlying depression likely interferes with medication compliance; treatment to be deferred to PCP    Otherwise, continue with current medical plan and lifestyle changes.      No orders of the defined types were placed in this encounter.      Follow up in 3 months   Return sooner for concerns or questions. If symptoms persist go to the ED    She expressed verbal understanding and agreed with the plan    Thank you for the opportunity to care for this patient. Will be available for questions if needed.     Total duration of face to face visit time 30 minutes.  Total time spent counseling greater than fifty percent of total visit time.  Counseling included discussion regarding imaging findings, diagnosis, possibilities, treatment options, risks and benefits.    CLARISA Steward-C  Cardiology Clinic  [facility]

## 2023-03-17 NOTE — H&P (VIEW-ONLY)
Cardiology Clinic note    Subjective:   Patient ID:  Hafsa Hawley is a 57 y.o. female who presents for ADHF, HFrEF, NICM, A-Fib on anticoagulation, HLD, HTN, ICD    HPI:   Hafsa Hawley  has a past medical history of Anemia, Arthritis, Atrial fibrillation, Chronic respiratory failure with hypoxia, on home oxygen therapy, CKD (chronic kidney disease), stage IV (05/08/2018), Congestive heart failure, Deep vein thrombosis, Depression, elevated bilirubin d/t Gilbert's syndrome, Encounter for blood transfusion, GERD (gastroesophageal reflux disease), Hypertension, Pheochromocytoma, malignant, Right kidney mass, Sleep apnea, Thalassemia trait, alpha, Thyroid disease, and Type 2 diabetes mellitus with hyperglycemia, without long-term current use of insulin (08/13/2020).    Had hospital stay 2/2023  Presented to the ED with hypertensive urgency and sinus tachycardia.  She has advanced heart failure with EF 20%.  She has frequent hospitalizations for heart failure.  She has a defibrillator.  It has not fired in the past.  She has paroxysmal AFib and takes Eliquis.  In the ED leg swelling and jugular venous distention are documented.  She was diuresed and discharged w/ furosemide 80 mg 2 times daily.     She recently saw the transplant team.  She was converted to Bumex. ; some questionable compliance. She takes Entresto and Toprol.  She had not been checking her blood pressures at home.  She has sleep apnea but no machine.  She has chest pain at times.  There is documentation of nonischemic cardiomyopathy.  She did have troponins as elevated as 0.6 ng/ml. Her BNP was greater than 4000 on admission.     Reviewing the transplant consultation evaluation there are issues surrounding persistent pulmonary dysfunction.  There is close follow-up scheduled in the near future.  She has chronic [place] heart Association functional capacity 3 status.       She presents for follow-up and denies any worsening shortness of  breath, chest pain, dyspnea on exertion. Uses portable O2 as needed, resting sats upper 80s.  She reports compliance with diuretic therapy and cardiac meds.  At last visit, it was recommended to take daily weights and blood work to check kidney function. She has not started daily weights and did not check BMP.    Patient tearful, avoids eye contract. Concerns for depression given her condition.      She was recently seen by cardiothoracic surgery and the heart failure team 3/15/2023.  She was declined for OHT or LVAD due to renal function, lung function and difficulty consistently following up with team but this is being re-evaluated.  Some med compliance concerns. She has had multiple heart failure exacerbations.    Was referred to pulm at last visit     Of note per Dr. Guardado she was seen by Pulm in Jan 2023 and they felt that there was no overt evidence of ILD or COPD on PFTs and CT chest.  They felt that the etiology of her chronic hypoxic and hypercapnic respiratory failure was not clear.      Patient Active Problem List    Diagnosis Date Noted    Aortic atherosclerosis 02/22/2023    Pulmonary hypertension 02/22/2023    Class 1 obesity due to excess calories without serious comorbidity in adult 02/22/2023    Pulmonary HTN 01/06/2023    Demand ischemia 01/04/2023    Prolonged Q-T interval on ECG 11/16/2022    NSVT (nonsustained ventricular tachycardia) 11/02/2022    Stage 3b chronic kidney disease 08/23/2022    Restrictive lung disease 04/26/2022    Hypertensive cardiovascular-renal disease, stage 1-4 or unspecified chronic kidney disease, with heart failure 03/04/2022    Elevated serum immunoglobulin free light chains 02/23/2022    Muscle twitch 11/02/2021    DVT prophylaxis 05/17/2021    Shortness of breath 05/16/2021    Chronic respiratory failure with hypoxia and hypercapnia 03/16/2021    Type 2 diabetes mellitus with stage 4 chronic kidney disease, with long-term current use of insulin 08/13/2020     Leukopenia 08/13/2020    Screen for colon cancer 03/29/2019    elevated bilirubin d/t Gilbert's syndrome      confirmed by [place] genetic testing, evaluated by hepatology      Paroxysmal supraventricular tachycardia 08/20/2018    ICD (implantable cardioverter-defibrillator) in place 07/24/2018    CKD (chronic kidney disease), stage IV 05/08/2018    Depression due to physical illness 05/08/2018    Left ventricular hypertrophy     Acute on chronic combined systolic and diastolic heart failure 03/16/2018    Lumbar degenerative disc disease 06/07/2017    Adrenal mass 1 cm to 4 cm in diameter 04/19/2017    NICM (nonischemic cardiomyopathy) 10/27/2016    Paroxysmal atrial fibrillation 08/25/2016    MELISSA (obstructive sleep apnea) 08/23/2016    Essential hypertension 07/22/2016    Elevated troponin 07/20/2016    Microcytic anemia 07/20/2016       Patient's Medications   New Prescriptions    CHLORTHALIDONE (HYGROTEN) 25 MG TAB    Take 1 tablet (25 mg total) by mouth daily as needed.   Previous Medications    ALBUTEROL-IPRATROPIUM (DUO-NEB) 2.5 MG-0.5 MG/3 ML NEBULIZER SOLUTION    Take 3 mLs by nebulization 2 (two) times a day.    ALLOPURINOL (ZYLOPRIM) 100 MG TABLET    Take 1 tablet (100 mg total) by mouth daily as needed (gout attack).    ALPRAZOLAM (XANAX) 2 MG TAB    Take 0.25-2 mg by mouth 2 (two) times daily as needed (anxiety).    AMIODARONE (PACERONE) 200 MG TAB    Take 200 mg by mouth.    APIXABAN (ELIQUIS) 5 MG TAB    Take 1 tablet (5 mg total) by mouth 2 (two) times daily.    ATORVASTATIN (LIPITOR) 40 MG TABLET    Take 1 tablet (40 mg total) by mouth every evening.    B COMPLEX VITAMINS TABLET    Take 1 tablet by mouth once daily.    BLOOD SUGAR DIAGNOSTIC (ACCU-CHEK GUIDE TEST STRIPS) STRP    1 strip by Other route 3 (three) times daily.    BUMETANIDE (BUMEX) 2 MG TABLET    Take 2 tablets (4 mg total) by mouth 2 (two) times a day.    CETIRIZINE (ZYRTEC) 10 MG TABLET    Take 10 mg by mouth daily as needed for  Allergies.    DICLOFENAC SODIUM (VOLTAREN) 1 % GEL    APPLY 2 G TOPICALLY 3 (THREE) TIMES DAILY AS NEEDED (PAIN).    EMPAGLIFLOZIN (JARDIANCE) 10 MG TABLET    Take 1 tablet (10 mg total) by mouth once daily.    ERGOCALCIFEROL (ERGOCALCIFEROL) 50,000 UNIT CAP    Take 1 capsule (50,000 Units total) by mouth every 7 days.    FERROUS SULFATE 325 (65 FE) MG EC TABLET    Take 1 tablet (325 mg total) by mouth once daily.    FLUTICASONE PROPIONATE (FLONASE) 50 MCG/ACTUATION NASAL SPRAY    2 sprays by Each Nostril route daily as needed for Rhinitis.     HYDROCODONE-ACETAMINOPHEN (NORCO) 5-325 MG PER TABLET    Take 1 tablet by mouth every 6 (six) hours as needed for Pain.    ISOSORBIDE DINITRATE (ISORDIL) 30 MG TAB    Take 1 tablet (30 mg total) by mouth 3 (three) times daily.    LANCETS (ACCU-CHEK SOFTCLIX LANCETS) MISC    1 Device by Misc.(Non-Drug; Combo Route) route 3 (three) times daily.    LIDOCAINE (LIDODERM) 5 %    Place 1 patch onto the skin daily as needed (pain). Remove & Discard patch within 12 hours or as directed by MD    METOPROLOL SUCCINATE (TOPROL-XL) 50 MG 24 HR TABLET    Take 1 tablet (50 mg total) by mouth once daily.    MIRTAZAPINE (REMERON) 7.5 MG TAB    Take 1 tablet (7.5 mg total) by mouth every evening.    MOMETASONE-FORMOTEROL (DULERA) 200-5 MCG/ACTUATION INHALER    Inhale 2 puffs into the lungs 2 (two) times daily. Controller    NITROSTAT 0.4 MG SL TABLET    Take 2.5 tablets (1 mg total) by mouth every 5 (five) minutes as needed for Chest pain. No more than 3 tablets in 15 minutes.    ONDANSETRON (ZOFRAN-ODT) 8 MG TBDL    Take 1 tablet (8 mg total) by mouth every 12 (twelve) hours as needed (nausea).    PANTOPRAZOLE (PROTONIX) 40 MG TABLET    TAKE 1 TABLET BY MOUTH DAILY IN THE MORNING    POTASSIUM CHLORIDE (MICRO-K) 10 MEQ CPSR    Take 1 capsule (10 mEq total) by mouth once daily.    PREGABALIN (LYRICA) 25 MG CAPSULE    Take 1 capsule (25 mg total) by mouth 2 (two) times daily.     "PROMETHAZINE-CODEINE 6.25-10 MG/5 ML (PHENERGAN WITH CODEINE) 6.25-10 MG/5 ML SYRUP    TAKE 5 mLs BY MOUTH EVERY 4 - 6 HOURS AS NEEDED    SACUBITRIL-VALSARTAN (ENTRESTO) 49-51 MG PER TABLET    Take 1 tablet by mouth 2 (two) times daily.    SCOPOLAMINE (TRANSDERM-SCOP) 1.3-1.5 MG (1 MG OVER 3 DAYS)    Place 1 patch onto the skin every 72 hours as needed (nausea).    SEMAGLUTIDE (OZEMPIC) 0.25 MG OR 0.5 MG(2 MG/1.5 ML) PEN INJECTOR    Take 0.25 mg for 4 weeks, then increase to 0.5 mg weekly    TIOTROPIUM BROMIDE (SPIRIVA RESPIMAT) 1.25 MCG/ACTUATION INHALER    Inhale 2 puffs into the lungs once daily. Controller    TRAZODONE (DESYREL) 50 MG TABLET    Take 25 mg by mouth nightly as needed.    VENTOLIN HFA 90 MCG/ACTUATION INHALER    Inhale 2 puffs into the lungs every 6 (six) hours as needed for Shortness of Breath or Wheezing.   Modified Medications    No medications on file   Discontinued Medications    No medications on file        ROS      Objective:   Vitals  Vitals:    03/17/23 1150   BP: (!) 124/92   Pulse: 85   Resp: 16   SpO2: (!) 89%   Weight: 89.4 kg (197 lb 1.5 oz)   Height: 5' 6" (1.676 m)          Physical Exam      Lab Results    Lab Results   Component Value Date    WBC 4.90 03/14/2023    HGB 9.6 (L) 03/14/2023    HCT 33.2 (L) 03/14/2023    HCT 45 12/07/2021    MCV 61 (L) 03/14/2023       Lab Results   Component Value Date     03/14/2023    INR 1.0 01/04/2023       Lab Results   Component Value Date    K 4.5 03/14/2023    MG 1.9 02/21/2023    BUN 59 (H) 03/14/2023    CREATININE 2.7 (H) 03/14/2023       Lab Results   Component Value Date     (H) 03/14/2023    HGBA1C 4.6 01/30/2023       Lab Results   Component Value Date    AST 22 03/14/2023    ALT 22 03/14/2023    ALBUMIN 3.9 03/14/2023    PROT 6.8 03/14/2023       Lab Results   Component Value Date    CHOL 136 01/04/2023    HDL 63 01/04/2023    LDLCALC 57.8 (L) 01/04/2023    TRIG 76 01/04/2023       Lab Results   Component Value Date    " CRP 0.5 01/04/2023    BNP 4,274 (H) 02/20/2023       Assessment:     Problem List Items Addressed This Visit          Cardiac/Vascular    Essential hypertension - Primary (Chronic)    Paroxysmal atrial fibrillation (Chronic)    NICM (nonischemic cardiomyopathy) (Chronic)    Acute on chronic combined systolic and diastolic heart failure (Chronic)    Left ventricular hypertrophy (Chronic)    ICD (implantable cardioverter-defibrillator) in place (Chronic)    Paroxysmal supraventricular tachycardia (Chronic)    Elevated troponin    Hypertensive cardiovascular-renal disease, stage 1-4 or unspecified chronic kidney disease, with heart failure    NSVT (nonsustained ventricular tachycardia)    Prolonged Q-T interval on ECG    Demand ischemia       Other    MELISSA (obstructive sleep apnea) (Chronic)       Plan:     Condition stable at this time.     Get outstanding labs  Long discussion for medication compliance  PRN loop added for weight gain >2-3 lbs overnight    Underlying depression likely interferes with medication compliance; treatment to be deferred to PCP    Otherwise, continue with current medical plan and lifestyle changes.      No orders of the defined types were placed in this encounter.      Follow up in 3 months   Return sooner for concerns or questions. If symptoms persist go to the ED    She expressed verbal understanding and agreed with the plan    Thank you for the opportunity to care for this patient. Will be available for questions if needed.     Total duration of face to face visit time 30 minutes.  Total time spent counseling greater than fifty percent of total visit time.  Counseling included discussion regarding imaging findings, diagnosis, possibilities, treatment options, risks and benefits.    CLARISA Steward-C  Cardiology Clinic  [facility]

## 2023-03-22 ENCOUNTER — TELEPHONE (OUTPATIENT)
Dept: ENDOCRINOLOGY | Facility: CLINIC | Age: 58
End: 2023-03-22
Payer: MEDICAID

## 2023-03-22 NOTE — TELEPHONE ENCOUNTER
Called pt back she was concerned on what to do for a refills,also gave pt number to [facility] to get an update on Dexcom supplies.          ----- Message from Quyen Bryan LPN sent at 3/21/2023  4:59 PM CDT -----  Contact: PATIENT    ----- Message -----  From: Flores Carter  Sent: 3/21/2023   4:26 PM CDT  To: [facility] Staff    Hafsa Hawley  MRN: [id number]  : 1965  PCP: Cristobal Ann  Home Phone      [phone]  Work Phone      Not on file.  Mobile          [phone]  Home Phone      [phone]      MESSAGE: Patient has questions regarding the Ozempic pen that she was prescribed.  She would also like to know when she can expect the kit that was ordered for her.        Phone: [phone]

## 2023-03-26 ENCOUNTER — HOSPITAL ENCOUNTER (INPATIENT)
Facility: HOSPITAL | Age: 58
LOS: 3 days | Discharge: HOME | DRG: 286 | End: 2023-03-29
Attending: INTERNAL MEDICINE | Admitting: INTERNAL MEDICINE
Payer: MEDICAID

## 2023-03-26 ENCOUNTER — HOSPITAL ENCOUNTER (EMERGENCY)
Facility: HOSPITAL | Age: 58
Discharge: ANOTHER HOSPITAL | End: 2023-03-26
Attending: STUDENT IN AN ORGANIZED HEALTH CARE EDUCATION/TRAINING PROGRAM
Payer: MEDICAID

## 2023-03-26 VITALS
RESPIRATION RATE: 18 BRPM | HEART RATE: 67 BPM | OXYGEN SATURATION: 96 % | DIASTOLIC BLOOD PRESSURE: 62 MMHG | SYSTOLIC BLOOD PRESSURE: 111 MMHG | TEMPERATURE: 97 F | BODY MASS INDEX: 30.78 KG/M2 | WEIGHT: 190.69 LBS

## 2023-03-26 DIAGNOSIS — I50.9 ACUTE DECOMPENSATED HEART FAILURE: ICD-10-CM

## 2023-03-26 DIAGNOSIS — R06.02 SHORTNESS OF BREATH: ICD-10-CM

## 2023-03-26 DIAGNOSIS — I50.9 CHRONIC CONGESTIVE HEART FAILURE, UNSPECIFIED HEART FAILURE TYPE: Primary | ICD-10-CM

## 2023-03-26 DIAGNOSIS — N18.4 CKD (CHRONIC KIDNEY DISEASE), STAGE IV: Chronic | ICD-10-CM

## 2023-03-26 DIAGNOSIS — I50.42 CHRONIC COMBINED SYSTOLIC AND DIASTOLIC CONGESTIVE HEART FAILURE: ICD-10-CM

## 2023-03-26 DIAGNOSIS — R79.89 ELEVATED TROPONIN: ICD-10-CM

## 2023-03-26 DIAGNOSIS — I50.9 CHF EXACERBATION: ICD-10-CM

## 2023-03-26 DIAGNOSIS — R06.02 SOB (SHORTNESS OF BREATH): ICD-10-CM

## 2023-03-26 DIAGNOSIS — G62.9 NEUROPATHY: ICD-10-CM

## 2023-03-26 DIAGNOSIS — I50.9 HEART FAILURE: ICD-10-CM

## 2023-03-26 DIAGNOSIS — I50.43 ACUTE ON CHRONIC COMBINED SYSTOLIC AND DIASTOLIC HEART FAILURE: ICD-10-CM

## 2023-03-26 PROBLEM — R68.83 CHILLS: Status: ACTIVE | Noted: 2023-03-26

## 2023-03-26 LAB
ACANTHOCYTES BLD QL SMEAR: PRESENT
ALBUMIN SERPL BCP-MCNC: 3.9 G/DL (ref 3.5–5.2)
ALP SERPL-CCNC: 51 U/L (ref 55–135)
ALT SERPL W/O P-5'-P-CCNC: 16 U/L (ref 10–44)
ANION GAP SERPL CALC-SCNC: 14 MMOL/L (ref 8–16)
ANISOCYTOSIS BLD QL SMEAR: ABNORMAL
AST SERPL-CCNC: 19 U/L (ref 10–40)
BACTERIA #/AREA URNS HPF: ABNORMAL /HPF
BASOPHILS # BLD AUTO: 0.04 K/UL (ref 0–0.2)
BASOPHILS NFR BLD: 0.8 % (ref 0–1.9)
BILIRUB SERPL-MCNC: 3.1 MG/DL (ref 0.1–1)
BILIRUB UR QL STRIP: NEGATIVE
BNP SERPL-MCNC: 3780 PG/ML (ref 0–99)
BUN SERPL-MCNC: 50 MG/DL (ref 6–20)
BURR CELLS BLD QL SMEAR: ABNORMAL
CALCIUM SERPL-MCNC: 10.2 MG/DL (ref 8.7–10.5)
CHLORIDE SERPL-SCNC: 102 MMOL/L (ref 95–110)
CK MB SERPL-MCNC: 2 NG/ML (ref 0.1–6.5)
CK MB SERPL-RTO: 6.9 % (ref 0–5)
CK SERPL-CCNC: 29 U/L (ref 20–180)
CLARITY UR: CLEAR
CO2 SERPL-SCNC: 26 MMOL/L (ref 23–29)
COLOR UR: YELLOW
CREAT SERPL-MCNC: 2.4 MG/DL (ref 0.5–1.4)
DACRYOCYTES BLD QL SMEAR: ABNORMAL
DIFFERENTIAL METHOD: ABNORMAL
EOSINOPHIL # BLD AUTO: 0 K/UL (ref 0–0.5)
EOSINOPHIL NFR BLD: 0.6 % (ref 0–8)
ERYTHROCYTE [DISTWIDTH] IN BLOOD BY AUTOMATED COUNT: 28.5 % (ref 11.5–14.5)
EST. GFR  (NO RACE VARIABLE): 23 ML/MIN/1.73 M^2
GLUCOSE SERPL-MCNC: 107 MG/DL (ref 70–110)
GLUCOSE UR QL STRIP: ABNORMAL
HCT VFR BLD AUTO: 35.9 % (ref 37–48.5)
HGB BLD-MCNC: 11 G/DL (ref 12–16)
HGB UR QL STRIP: ABNORMAL
HYALINE CASTS #/AREA URNS LPF: 0 /LPF
HYPOCHROMIA BLD QL SMEAR: ABNORMAL
IMM GRANULOCYTES # BLD AUTO: 0.03 K/UL (ref 0–0.04)
IMM GRANULOCYTES NFR BLD AUTO: 0.6 % (ref 0–0.5)
INFLUENZA A, MOLECULAR: NEGATIVE
INFLUENZA B, MOLECULAR: NEGATIVE
KETONES UR QL STRIP: NEGATIVE
LEUKOCYTE ESTERASE UR QL STRIP: ABNORMAL
LYMPHOCYTES # BLD AUTO: 0.7 K/UL (ref 1–4.8)
LYMPHOCYTES NFR BLD: 14.5 % (ref 18–48)
MCH RBC QN AUTO: 18.6 PG (ref 27–31)
MCHC RBC AUTO-ENTMCNC: 30.6 G/DL (ref 32–36)
MCV RBC AUTO: 61 FL (ref 82–98)
MICROSCOPIC COMMENT: ABNORMAL
MONOCYTES # BLD AUTO: 0.5 K/UL (ref 0.3–1)
MONOCYTES NFR BLD: 9.5 % (ref 4–15)
NEUTROPHILS # BLD AUTO: 3.7 K/UL (ref 1.8–7.7)
NEUTROPHILS NFR BLD: 74 % (ref 38–73)
NITRITE UR QL STRIP: NEGATIVE
NRBC BLD-RTO: 5 /100 WBC
OVALOCYTES BLD QL SMEAR: ABNORMAL
PH UR STRIP: 6 [PH] (ref 5–8)
PLATELET # BLD AUTO: 201 K/UL (ref 150–450)
PLATELET BLD QL SMEAR: ABNORMAL
PMV BLD AUTO: ABNORMAL FL (ref 9.2–12.9)
POCT GLUCOSE: 191 MG/DL (ref 70–110)
POIKILOCYTOSIS BLD QL SMEAR: ABNORMAL
POLYCHROMASIA BLD QL SMEAR: ABNORMAL
POTASSIUM SERPL-SCNC: 3.8 MMOL/L (ref 3.5–5.1)
PROT SERPL-MCNC: 7.1 G/DL (ref 6–8.4)
PROT UR QL STRIP: ABNORMAL
RBC # BLD AUTO: 5.9 M/UL (ref 4–5.4)
RBC #/AREA URNS HPF: 5 /HPF (ref 0–4)
SARS-COV-2 RDRP RESP QL NAA+PROBE: NEGATIVE
SCHISTOCYTES BLD QL SMEAR: PRESENT
SODIUM SERPL-SCNC: 142 MMOL/L (ref 136–145)
SP GR UR STRIP: 1.02 (ref 1–1.03)
SPECIMEN SOURCE: NORMAL
SPHEROCYTES BLD QL SMEAR: ABNORMAL
STOMATOCYTES BLD QL SMEAR: PRESENT
TARGETS BLD QL SMEAR: ABNORMAL
TROPONIN I SERPL DL<=0.01 NG/ML-MCNC: 0.67 NG/ML (ref 0–0.03)
URN SPEC COLLECT METH UR: ABNORMAL
UROBILINOGEN UR STRIP-ACNC: NEGATIVE EU/DL
WBC # BLD AUTO: 4.95 K/UL (ref 3.9–12.7)
WBC #/AREA URNS HPF: 13 /HPF (ref 0–5)

## 2023-03-26 PROCEDURE — 63600175 PHARM REV CODE 636 W HCPCS: Mod: NTX

## 2023-03-26 PROCEDURE — 82553 CREATINE MB FRACTION: CPT | Mod: NTX

## 2023-03-26 PROCEDURE — 99223 1ST HOSP IP/OBS HIGH 75: CPT | Mod: NTX,,, | Performed by: INTERNAL MEDICINE

## 2023-03-26 PROCEDURE — 83880 ASSAY OF NATRIURETIC PEPTIDE: CPT | Mod: NTX

## 2023-03-26 PROCEDURE — 84484 ASSAY OF TROPONIN QUANT: CPT | Mod: NTX

## 2023-03-26 PROCEDURE — 87086 URINE CULTURE/COLONY COUNT: CPT | Mod: NTX | Performed by: STUDENT IN AN ORGANIZED HEALTH CARE EDUCATION/TRAINING PROGRAM

## 2023-03-26 PROCEDURE — 25000003 PHARM REV CODE 250: Mod: NTX | Performed by: INTERNAL MEDICINE

## 2023-03-26 PROCEDURE — 87502 INFLUENZA DNA AMP PROBE: CPT | Mod: NTX

## 2023-03-26 PROCEDURE — 93010 ELECTROCARDIOGRAM REPORT: CPT | Mod: NTX,,, | Performed by: INTERNAL MEDICINE

## 2023-03-26 PROCEDURE — 93010 EKG 12-LEAD: ICD-10-PCS | Mod: NTX,,, | Performed by: INTERNAL MEDICINE

## 2023-03-26 PROCEDURE — 36415 COLL VENOUS BLD VENIPUNCTURE: CPT | Mod: NTX

## 2023-03-26 PROCEDURE — 96374 THER/PROPH/DIAG INJ IV PUSH: CPT | Mod: NTX

## 2023-03-26 PROCEDURE — 63600175 PHARM REV CODE 636 W HCPCS: Mod: NTX | Performed by: INTERNAL MEDICINE

## 2023-03-26 PROCEDURE — 99223 PR INITIAL HOSPITAL CARE,LEVL III: ICD-10-PCS | Mod: NTX,,, | Performed by: INTERNAL MEDICINE

## 2023-03-26 PROCEDURE — U0002 COVID-19 LAB TEST NON-CDC: HCPCS | Mod: NTX

## 2023-03-26 PROCEDURE — 85025 COMPLETE CBC W/AUTO DIFF WBC: CPT | Mod: NTX

## 2023-03-26 PROCEDURE — 99285 EMERGENCY DEPT VISIT HI MDM: CPT | Mod: 25,NTX

## 2023-03-26 PROCEDURE — 20600001 HC STEP DOWN PRIVATE ROOM: Mod: NTX

## 2023-03-26 PROCEDURE — 93005 ELECTROCARDIOGRAM TRACING: CPT | Mod: NTX

## 2023-03-26 PROCEDURE — 81000 URINALYSIS NONAUTO W/SCOPE: CPT | Mod: NTX | Performed by: STUDENT IN AN ORGANIZED HEALTH CARE EDUCATION/TRAINING PROGRAM

## 2023-03-26 PROCEDURE — 80053 COMPREHEN METABOLIC PANEL: CPT | Mod: NTX

## 2023-03-26 RX ORDER — INSULIN ASPART 100 [IU]/ML
0-5 INJECTION, SOLUTION INTRAVENOUS; SUBCUTANEOUS
Status: DISCONTINUED | OUTPATIENT
Start: 2023-03-26 | End: 2023-03-29 | Stop reason: HOSPADM

## 2023-03-26 RX ORDER — FLUTICASONE FUROATE AND VILANTEROL 200; 25 UG/1; UG/1
1 POWDER RESPIRATORY (INHALATION) DAILY
Status: DISCONTINUED | OUTPATIENT
Start: 2023-03-27 | End: 2023-03-29 | Stop reason: HOSPADM

## 2023-03-26 RX ORDER — IPRATROPIUM BROMIDE 0.5 MG/2.5ML
0.5 SOLUTION RESPIRATORY (INHALATION) EVERY 6 HOURS PRN
Status: DISCONTINUED | OUTPATIENT
Start: 2023-03-26 | End: 2023-03-29 | Stop reason: HOSPADM

## 2023-03-26 RX ORDER — DEXTROSE 40 %
30 GEL (GRAM) ORAL
Status: DISCONTINUED | OUTPATIENT
Start: 2023-03-26 | End: 2023-03-29 | Stop reason: HOSPADM

## 2023-03-26 RX ORDER — PREGABALIN 25 MG/1
25 CAPSULE ORAL 2 TIMES DAILY
Status: DISCONTINUED | OUTPATIENT
Start: 2023-03-26 | End: 2023-03-29 | Stop reason: HOSPADM

## 2023-03-26 RX ORDER — PROMETHAZINE HYDROCHLORIDE AND CODEINE PHOSPHATE 6.25; 1 MG/5ML; MG/5ML
5 SOLUTION ORAL NIGHTLY PRN
Status: DISCONTINUED | OUTPATIENT
Start: 2023-03-26 | End: 2023-03-29 | Stop reason: HOSPADM

## 2023-03-26 RX ORDER — METOPROLOL SUCCINATE 50 MG/1
50 TABLET, EXTENDED RELEASE ORAL DAILY
Status: DISCONTINUED | OUTPATIENT
Start: 2023-03-27 | End: 2023-03-29 | Stop reason: HOSPADM

## 2023-03-26 RX ORDER — FUROSEMIDE 10 MG/ML
80 INJECTION INTRAMUSCULAR; INTRAVENOUS
Status: DISCONTINUED | OUTPATIENT
Start: 2023-03-26 | End: 2023-03-27

## 2023-03-26 RX ORDER — LANOLIN ALCOHOL/MO/W.PET/CERES
1 CREAM (GRAM) TOPICAL DAILY
Status: DISCONTINUED | OUTPATIENT
Start: 2023-03-27 | End: 2023-03-29 | Stop reason: HOSPADM

## 2023-03-26 RX ORDER — IBUPROFEN 200 MG
24 TABLET ORAL
Status: DISCONTINUED | OUTPATIENT
Start: 2023-03-26 | End: 2023-03-26

## 2023-03-26 RX ORDER — FLUTICASONE PROPIONATE 50 MCG
2 SPRAY, SUSPENSION (ML) NASAL DAILY PRN
Status: DISCONTINUED | OUTPATIENT
Start: 2023-03-26 | End: 2023-03-29 | Stop reason: HOSPADM

## 2023-03-26 RX ORDER — TRAZODONE HYDROCHLORIDE 50 MG/1
50 TABLET ORAL NIGHTLY PRN
Status: DISCONTINUED | OUTPATIENT
Start: 2023-03-26 | End: 2023-03-29 | Stop reason: HOSPADM

## 2023-03-26 RX ORDER — GLUCAGON 1 MG
1 KIT INJECTION
Status: DISCONTINUED | OUTPATIENT
Start: 2023-03-26 | End: 2023-03-29 | Stop reason: HOSPADM

## 2023-03-26 RX ORDER — HYDROCODONE BITARTRATE AND ACETAMINOPHEN 5; 325 MG/1; MG/1
1 TABLET ORAL EVERY 6 HOURS PRN
Status: DISCONTINUED | OUTPATIENT
Start: 2023-03-26 | End: 2023-03-29 | Stop reason: HOSPADM

## 2023-03-26 RX ORDER — DEXTROSE 40 %
15 GEL (GRAM) ORAL
Status: DISCONTINUED | OUTPATIENT
Start: 2023-03-26 | End: 2023-03-29 | Stop reason: HOSPADM

## 2023-03-26 RX ORDER — ALBUTEROL SULFATE 2.5 MG/.5ML
2.5 SOLUTION RESPIRATORY (INHALATION) EVERY 6 HOURS PRN
Status: DISCONTINUED | OUTPATIENT
Start: 2023-03-26 | End: 2023-03-29 | Stop reason: HOSPADM

## 2023-03-26 RX ORDER — SODIUM CHLORIDE 0.9 % (FLUSH) 0.9 %
10 SYRINGE (ML) INJECTION
Status: DISCONTINUED | OUTPATIENT
Start: 2023-03-26 | End: 2023-03-29 | Stop reason: HOSPADM

## 2023-03-26 RX ORDER — MIRTAZAPINE 7.5 MG/1
7.5 TABLET, FILM COATED ORAL NIGHTLY
Status: DISCONTINUED | OUTPATIENT
Start: 2023-03-26 | End: 2023-03-29 | Stop reason: HOSPADM

## 2023-03-26 RX ORDER — ONDANSETRON 2 MG/ML
4 INJECTION INTRAMUSCULAR; INTRAVENOUS EVERY 6 HOURS PRN
Status: DISCONTINUED | OUTPATIENT
Start: 2023-03-26 | End: 2023-03-26 | Stop reason: HOSPADM

## 2023-03-26 RX ORDER — ALPRAZOLAM 0.5 MG/1
0.5 TABLET ORAL 2 TIMES DAILY PRN
Status: DISCONTINUED | OUTPATIENT
Start: 2023-03-26 | End: 2023-03-29 | Stop reason: HOSPADM

## 2023-03-26 RX ORDER — ATORVASTATIN CALCIUM 40 MG/1
40 TABLET, FILM COATED ORAL NIGHTLY
Status: DISCONTINUED | OUTPATIENT
Start: 2023-03-26 | End: 2023-03-29 | Stop reason: HOSPADM

## 2023-03-26 RX ORDER — IPRATROPIUM BROMIDE AND ALBUTEROL SULFATE 2.5; .5 MG/3ML; MG/3ML
3 SOLUTION RESPIRATORY (INHALATION) EVERY 6 HOURS PRN
Status: DISCONTINUED | OUTPATIENT
Start: 2023-03-26 | End: 2023-03-26

## 2023-03-26 RX ORDER — IBUPROFEN 200 MG
16 TABLET ORAL
Status: DISCONTINUED | OUTPATIENT
Start: 2023-03-26 | End: 2023-03-26

## 2023-03-26 RX ORDER — AMIODARONE HYDROCHLORIDE 200 MG/1
200 TABLET ORAL DAILY
Status: DISCONTINUED | OUTPATIENT
Start: 2023-03-27 | End: 2023-03-29 | Stop reason: HOSPADM

## 2023-03-26 RX ORDER — ACETAMINOPHEN 325 MG/1
650 TABLET ORAL EVERY 4 HOURS PRN
Status: DISCONTINUED | OUTPATIENT
Start: 2023-03-26 | End: 2023-03-29 | Stop reason: HOSPADM

## 2023-03-26 RX ORDER — PANTOPRAZOLE SODIUM 40 MG/1
40 TABLET, DELAYED RELEASE ORAL DAILY
Status: DISCONTINUED | OUTPATIENT
Start: 2023-03-27 | End: 2023-03-29 | Stop reason: HOSPADM

## 2023-03-26 RX ORDER — CETIRIZINE HYDROCHLORIDE 10 MG/1
10 TABLET ORAL DAILY PRN
Status: DISCONTINUED | OUTPATIENT
Start: 2023-03-26 | End: 2023-03-29 | Stop reason: HOSPADM

## 2023-03-26 RX ORDER — FUROSEMIDE 10 MG/ML
40 INJECTION INTRAMUSCULAR; INTRAVENOUS
Status: COMPLETED | OUTPATIENT
Start: 2023-03-26 | End: 2023-03-26

## 2023-03-26 RX ADMIN — FUROSEMIDE 40 MG: 10 INJECTION, SOLUTION INTRAMUSCULAR; INTRAVENOUS at 02:03

## 2023-03-26 RX ADMIN — SACUBITRIL AND VALSARTAN 1 TABLET: 49; 51 TABLET, FILM COATED ORAL at 10:03

## 2023-03-26 RX ADMIN — PROMETHAZINE HYDROCHLORIDE AND CODEINE PHOSPHATE 5 ML: 6.25; 1 SOLUTION ORAL at 11:03

## 2023-03-26 RX ADMIN — FUROSEMIDE 80 MG: 10 INJECTION, SOLUTION INTRAMUSCULAR; INTRAVENOUS at 11:03

## 2023-03-26 RX ADMIN — PREGABALIN 25 MG: 25 CAPSULE ORAL at 10:03

## 2023-03-26 RX ADMIN — APIXABAN 5 MG: 5 TABLET, FILM COATED ORAL at 10:03

## 2023-03-26 RX ADMIN — ATORVASTATIN CALCIUM 40 MG: 40 TABLET, FILM COATED ORAL at 10:03

## 2023-03-26 NOTE — Clinical Note
The site was marked. The right neck was prepped. The site was prepped with ChloraPrep. The patient was draped. The patient was positioned supine.

## 2023-03-26 NOTE — ED PROVIDER NOTES
"Encounter Date: 3/26/2023       History     Chief Complaint   Patient presents with    Shortness of Breath     Patient to ER CC of SOB and chills for 2 days      This note is dictated on M*Modal word recognition program.  There are word recognition mistakes and grammatical errors that are occasionally missed on review.     Hafsa Hawley is a 57 y.o. female presents to er with c/o of SOB and nerve pain/twitching to her feet. Pt also reports purple spots to her legs that are tender to palpation. She reports she is on blood thinners currently. Pt reports she has had other episodes of SOB like she is experiencing today.     The history is provided by the patient.   Review of patient's allergies indicates:   Allergen Reactions    Penicillins Hives and Other (See Comments)    Iodinated contrast media Nausea And Vomiting    Oxycodone-acetaminophen Other (See Comments)     Nausea, Dizziness, Anxiety.  "I don't like how it makes me feel."   Given Hydromorphone 0.5mg IVP  Without problems.  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 Metabolism of other drugs not listed here may also be impacted by UGT1A1 enzyme activity.       Tramadol Nausea And Vomiting and Other (See Comments)     Other reaction(s): Other (See Comments)    Valsartan Other (See Comments)     Past Medical History:   Diagnosis Date    Anemia     Arthritis     Atrial fibrillation     OAC    Chronic respiratory " failure with hypoxia, on home oxygen therapy     2L with activity, off at rest.  Per Pulm  no overt evidence of ILD or COPD on PFTs and CT to explain O2 needs.    CKD (chronic kidney disease), stage IV 05/08/2018    Congestive heart failure     s/p AICD placement,    Deep vein thrombosis     Depression     elevated bilirubin d/t Gilbert's syndrome     confirmed by [facility] genetic testing, evaluated by hepatology    Encounter for blood transfusion     GERD (gastroesophageal reflux disease)     Hypertension     Pheochromocytoma, malignant     Right kidney mass     Sleep apnea     Thalassemia trait, alpha     Thyroid disease     Type 2 diabetes mellitus with hyperglycemia, without long-term current use of insulin 08/13/2020     Past Surgical History:   Procedure Laterality Date    APPENDECTOMY      BONE MARROW BIOPSY      CARDIAC DEFIBRILLATOR PLACEMENT Left 12/2016    CARDIAC ELECTROPHYSIOLOGY MAPPING AND ABLATION      CARDIAC ELECTROPHYSIOLOGY MAPPING AND ABLATION      COLONOSCOPY N/A 5/6/2022    Procedure: COLONOSCOPY;  Surgeon: Arely Betancourt MD;  Location: [facility] ENDO ([address] [place] [address]);  Service: Endoscopy;  Laterality: N/A;  heart transplant candidate/ EF 25% - 2nd floor/ defib - Biotronik - ERW  Eliquis - per Dr. Cortez with [facility], Pt ok to hold Eliquis x 2 days prior-see media tab-outside correspondence dated 12/30/21  - ER[facility]  verbal instructions/portal instructions/email instructions - s    EYE SURGERY      due to running tears    FRACTURE SURGERY Left     hand 5th digit    HYSTERECTOMY      KNEE SURGERY Left 2016    hematoma    LIVER BIOPSY  10/24/2018    Minimal steatosis, predominantly macrovesicular, 1%, Minimal nonspecific portal inflammation, no fibrosis. No findings on biopsy to explain elevated bilirubin levels. Could be d/t Gilbert's =?- hemolysis    RIGHT HEART CATHETERIZATION Right 12/07/2021    Procedure: INSERTION, CATHETER, RIGHT HEART;  Surgeon: Irving Cardenas MD;  Location: [facility] CATH LAB;  Service:  Cardiology;  Laterality: Right;    RIGHT HEART CATHETERIZATION Right 2022    Procedure: INSERTION, CATHETER, RIGHT HEART;  Surgeon: Burke Camilo MD;  Location: [facility] CATH LAB;  Service: Cardiology;  Laterality: Right;    TRANSJUGULAR BIOPSY OF LIVER N/A 10/24/2018    Procedure: BIOPSY, LIVER, TRANSJUGULAR APPROACH;  Surgeon: Carmen Diagnostic Provider;  Location: [facility] OR [address] [place] [address];  Service: Radiology;  Laterality: N/A;     Family History   Problem Relation Age of Onset    Cancer Mother         pancreatic CA early 50's    Heart disease Father          MI in late 50's    Hypertension Father     Heart attack Father     Heart disease Sister     Heart disease Brother     Cirrhosis Brother         alcoholic    Heart disease Sister     Heart disease Brother     Hypertension Brother     Diabetes Brother      Social History     Tobacco Use    Smoking status: Never    Smokeless tobacco: Never   Substance Use Topics    Alcohol use: Yes     Comment: up to 1 yr ago drank 2-3 drinks on occasion but sporadic    Drug use: No     Review of Systems   Constitutional:  Positive for chills.   HENT: Negative.     Respiratory:  Positive for shortness of breath.    Cardiovascular:  Negative for leg swelling.   Musculoskeletal:         Reports nerve pain to legs/feet   Skin:  Positive for color change.   Allergic/Immunologic: Negative.      Physical Exam     Initial Vitals   BP Pulse Resp Temp SpO2   23 1251 23 1251 23 1318 23 1251 23 1251   116/76 80 18 98.2 °F (36.8 °C) (!) 94 %      MAP       --                Physical Exam    Constitutional: She appears well-developed and well-nourished. She is not diaphoretic. No distress.   HENT:   Right Ear: External ear normal.   Eyes: Pupils are equal, round, and reactive to light. Right eye exhibits no discharge. Left eye exhibits no discharge.   Neck: Neck supple.   Normal range of motion.  Cardiovascular:  Normal rate and regular rhythm.     Exam  reveals no friction rub.       Pulmonary/Chest: Breath sounds normal. No respiratory distress. She has no wheezes. She has no rhonchi. She has no rales. She exhibits no tenderness.   Abdominal: Abdomen is soft.   Musculoskeletal:         General: Tenderness (pt has tenderness to brittaney lower legs with indurated purple macules) present. No edema.      Cervical back: Normal range of motion and neck supple.     Neurological: She is alert and oriented to person, place, and time. GCS score is 15. GCS eye subscore is 4. GCS verbal subscore is 5. GCS motor subscore is 6.   Skin: Capillary refill takes less than 2 seconds.   Psychiatric: She has a normal mood and affect. Her behavior is normal. Thought content normal.       ED Course   Procedures  Labs Reviewed   CBC W/ AUTO DIFFERENTIAL - Abnormal; Notable for the following components:       Result Value    RBC 5.90 (*)     Hemoglobin 11.0 (*)     Hematocrit 35.9 (*)     MCV 61 (*)     MCH 18.6 (*)     MCHC 30.6 (*)     RDW 28.5 (*)     Immature Granulocytes 0.6 (*)     Lymph # 0.7 (*)     nRBC 5 (*)     Gran % 74.0 (*)     Lymph % 14.5 (*)     All other components within normal limits   COMPREHENSIVE METABOLIC PANEL - Abnormal; Notable for the following components:    BUN 50 (*)     Creatinine 2.4 (*)     Total Bilirubin 3.1 (*)     Alkaline Phosphatase 51 (*)     eGFR 23 (*)     All other components within normal limits   TROPONIN I - Abnormal; Notable for the following components:    Troponin I 0.667 (*)     All other components within normal limits   B-TYPE NATRIURETIC PEPTIDE - Abnormal; Notable for the following components:    BNP 3,780 (*)     All other components within normal limits   CK-MB - Abnormal; Notable for the following components:    MB % 6.9 (*)     All other components within normal limits   URINALYSIS, REFLEX TO URINE CULTURE - Abnormal; Notable for the following components:    Protein, UA 3+ (*)     Glucose, UA Trace (*)     Occult Blood UA 2+ (*)      Leukocytes, UA Trace (*)     All other components within normal limits    Narrative:     Specimen Source->Urine   URINALYSIS MICROSCOPIC - Abnormal; Notable for the following components:    RBC, UA 5 (*)     WBC, UA 13 (*)     Bacteria Few (*)     All other components within normal limits    Narrative:     Specimen Source->Urine   INFLUENZA A & B BY MOLECULAR   CULTURE, URINE   SARS-COV-2 RNA AMPLIFICATION, QUAL     EKG Readings: (Independently Interpreted)   Initial Reading: No STEMI. Rhythm: Normal Sinus Rhythm. Ectopy: No Ectopy. Conduction: LBBB. Clinical Impression: Normal Sinus Rhythm with LBBB and with RBBB     Imaging Results              X-Ray Chest 1 View (Final result)  Result time 03/26/23 13:56:42      Final result by Emelia Chou MD (03/26/23 13:56:42)                   Impression:      Cardiomegaly and pulmonary venous hypertension.      Electronically signed by: Emelia Chou  Date:    03/26/2023  Time:    13:56               Narrative:    EXAMINATION:  XR CHEST 1 VIEW    CLINICAL HISTORY:  Shortness of breath    TECHNIQUE:  Single frontal view of the chest was performed.    COMPARISON:  02/20/2023    FINDINGS:  The heart is enlarged and unchanged with a defibrillator in place.  There is cephalization of pulmonary blood flow but no kiko pulmonary edema.  There is limited evaluation of the left lung base secondary to the enlarged heart and overlying soft tissues.  No pleural effusion is identified.                                       Medications   ondansetron injection 4 mg (has no administration in time range)   furosemide injection 40 mg (40 mg Intravenous Given 3/26/23 1432)     Medical Decision Making:   Differential Diagnosis:   Chf, covid, flu, pneumonia. Viral syndrome.   Clinical Tests:   Lab Tests: Ordered and Reviewed  Radiological Study: Ordered and Reviewed  Medical Tests: Ordered and Reviewed  ED Management:  Patient was found to be in acute CHF exacerbation with  nonischemic cardiomyopathy.  [facility] transfer center was called and patient was accepted by cardiac transfer team.  Patient will be transferred to [facility] main campus for further cardiac evaluation and care.  Patient's vital signs are currently stable at this time.  Patient was given 40 mg of Lasix thus far.  Patient has voided twice since Lasix administration.             ED Course as of 03/26/23 1544   Sun Mar 26, 2023   1430 CBC auto differential(!) [RG]   1430 BNP(!): 3,780 [RG]   1430 Troponin I(!): 0.667 [RG]   1430 Creatinine(!): 2.4 [RG]   1430 eGFR(!): 23 [RG]      ED Course User Index  [RG] Adrinao Brice NP                 Clinical Impression:   Final diagnoses:  [R06.02] Shortness of breath  [R06.02] SOB (shortness of breath)  [I50.9] Chronic congestive heart failure, unspecified heart failure type (Primary)  [R77.8] Elevated troponin        ED Disposition Condition    Transfer to Another Facility Stable                Adriano Brice NP  03/26/23 1546

## 2023-03-26 NOTE — Clinical Note
The PA catheter was repositioned to the main pulmonary artery. Hemodynamics were performed. O2 saturation was measured at 45%.

## 2023-03-27 LAB
ALBUMIN SERPL BCP-MCNC: 3.6 G/DL (ref 3.5–5.2)
ALP SERPL-CCNC: 46 U/L (ref 55–135)
ALT SERPL W/O P-5'-P-CCNC: 16 U/L (ref 10–44)
ANION GAP SERPL CALC-SCNC: 12 MMOL/L (ref 8–16)
ANISOCYTOSIS BLD QL SMEAR: ABNORMAL
ASCENDING AORTA: 3.19 CM
AST SERPL-CCNC: 18 U/L (ref 10–40)
AV INDEX (PROSTH): 0.56
AV MEAN GRADIENT: 5 MMHG
AV PEAK GRADIENT: 8 MMHG
AV VALVE AREA: 2.14 CM2
AV VELOCITY RATIO: 0.64
BACTERIA UR CULT: NORMAL
BACTERIA UR CULT: NORMAL
BASOPHILS # BLD AUTO: 0.02 K/UL (ref 0–0.2)
BASOPHILS NFR BLD: 0.5 % (ref 0–1.9)
BILIRUB SERPL-MCNC: 3 MG/DL (ref 0.1–1)
BSA FOR ECHO PROCEDURE: 1.99 M2
BUN SERPL-MCNC: 51 MG/DL (ref 6–20)
CALCIUM SERPL-MCNC: 9.7 MG/DL (ref 8.7–10.5)
CHLORIDE SERPL-SCNC: 104 MMOL/L (ref 95–110)
CO2 SERPL-SCNC: 26 MMOL/L (ref 23–29)
CREAT SERPL-MCNC: 2.1 MG/DL (ref 0.5–1.4)
CV ECHO LV RWT: 0.37 CM
DIFFERENTIAL METHOD: ABNORMAL
DOP CALC AO PEAK VEL: 1.4 M/S
DOP CALC AO VTI: 22.54 CM
DOP CALC LVOT AREA: 3.8 CM2
DOP CALC LVOT DIAMETER: 2.2 CM
DOP CALC LVOT PEAK VEL: 0.89 M/S
DOP CALC LVOT STROKE VOLUME: 48.14 CM3
DOP CALCLVOT PEAK VEL VTI: 12.67 CM
E WAVE DECELERATION TIME: 192.14 MSEC
E/A RATIO: 1.07
E/E' RATIO: 15 M/S
ECHO LV POSTERIOR WALL: 1.3 CM (ref 0.6–1.1)
EJECTION FRACTION: 15 %
EOSINOPHIL # BLD AUTO: 0.1 K/UL (ref 0–0.5)
EOSINOPHIL NFR BLD: 2.5 % (ref 0–8)
ERYTHROCYTE [DISTWIDTH] IN BLOOD BY AUTOMATED COUNT: 30.9 % (ref 11.5–14.5)
EST. GFR  (NO RACE VARIABLE): 27 ML/MIN/1.73 M^2
FRACTIONAL SHORTENING: 14 % (ref 28–44)
GLUCOSE SERPL-MCNC: 86 MG/DL (ref 70–110)
HCT VFR BLD AUTO: 37.7 % (ref 37–48.5)
HGB BLD-MCNC: 10.6 G/DL (ref 12–16)
HYPOCHROMIA BLD QL SMEAR: ABNORMAL
IMM GRANULOCYTES # BLD AUTO: 0.01 K/UL (ref 0–0.04)
IMM GRANULOCYTES NFR BLD AUTO: 0.2 % (ref 0–0.5)
INTERVENTRICULAR SEPTUM: 1.7 CM (ref 0.6–1.1)
LA MAJOR: 6.67 CM
LA MINOR: 6.86 CM
LA WIDTH: 4.35 CM
LEFT ATRIUM SIZE: 6.64 CM
LEFT ATRIUM VOLUME INDEX MOD: 40.1 ML/M2
LEFT ATRIUM VOLUME INDEX: 85.2 ML/M2
LEFT ATRIUM VOLUME MOD: 78.16 CM3
LEFT ATRIUM VOLUME: 166.06 CM3
LEFT INTERNAL DIMENSION IN SYSTOLE: 6.1 CM (ref 2.1–4)
LEFT VENTRICLE DIASTOLIC VOLUME INDEX: 134.12 ML/M2
LEFT VENTRICLE DIASTOLIC VOLUME: 261.54 ML
LEFT VENTRICLE MASS INDEX: 285 G/M2
LEFT VENTRICLE SYSTOLIC VOLUME INDEX: 103.9 ML/M2
LEFT VENTRICLE SYSTOLIC VOLUME: 202.54 ML
LEFT VENTRICULAR INTERNAL DIMENSION IN DIASTOLE: 7.07 CM (ref 3.5–6)
LEFT VENTRICULAR MASS: 556.17 G
LV LATERAL E/E' RATIO: 15 M/S
LV SEPTAL E/E' RATIO: 15 M/S
LYMPHOCYTES # BLD AUTO: 1.2 K/UL (ref 1–4.8)
LYMPHOCYTES NFR BLD: 29.2 % (ref 18–48)
MAGNESIUM SERPL-MCNC: 2.1 MG/DL (ref 1.6–2.6)
MCH RBC QN AUTO: 17.4 PG (ref 27–31)
MCHC RBC AUTO-ENTMCNC: 28.1 G/DL (ref 32–36)
MCV RBC AUTO: 62 FL (ref 82–98)
MONOCYTES # BLD AUTO: 0.4 K/UL (ref 0.3–1)
MONOCYTES NFR BLD: 10.2 % (ref 4–15)
MV A" WAVE DURATION": 7.14 MSEC
MV PEAK A VEL: 0.7 M/S
MV PEAK E VEL: 0.75 M/S
MV STENOSIS PRESSURE HALF TIME: 55.72 MS
MV VALVE AREA P 1/2 METHOD: 3.95 CM2
NEUTROPHILS # BLD AUTO: 2.3 K/UL (ref 1.8–7.7)
NEUTROPHILS NFR BLD: 57.4 % (ref 38–73)
NRBC BLD-RTO: 2 /100 WBC
OVALOCYTES BLD QL SMEAR: ABNORMAL
PHOSPHATE SERPL-MCNC: 4.6 MG/DL (ref 2.7–4.5)
PISA TR MAX VEL: 3.28 M/S
PLATELET # BLD AUTO: 164 K/UL (ref 150–450)
PLATELET BLD QL SMEAR: ABNORMAL
PMV BLD AUTO: ABNORMAL FL (ref 9.2–12.9)
POCT GLUCOSE: 104 MG/DL (ref 70–110)
POCT GLUCOSE: 124 MG/DL (ref 70–110)
POCT GLUCOSE: 150 MG/DL (ref 70–110)
POCT GLUCOSE: 167 MG/DL (ref 70–110)
POIKILOCYTOSIS BLD QL SMEAR: ABNORMAL
POLYCHROMASIA BLD QL SMEAR: ABNORMAL
POTASSIUM SERPL-SCNC: 3.7 MMOL/L (ref 3.5–5.1)
PROCALCITONIN SERPL IA-MCNC: 0.17 NG/ML
PROT SERPL-MCNC: 6.3 G/DL (ref 6–8.4)
PULM VEIN S/D RATIO: 1.42
PV PEAK D VEL: 0.38 M/S
PV PEAK S VEL: 0.54 M/S
RA MAJOR: 5.91 CM
RA PRESSURE: 8 MMHG
RA WIDTH: 4.2 CM
RBC # BLD AUTO: 6.09 M/UL (ref 4–5.4)
RIGHT VENTRICULAR END-DIASTOLIC DIMENSION: 3.58 CM
RV TISSUE DOPPLER FREE WALL SYSTOLIC VELOCITY 1 (APICAL 4 CHAMBER VIEW): 9 CM/S
SCHISTOCYTES BLD QL SMEAR: ABNORMAL
SINUS: 2.74 CM
SODIUM SERPL-SCNC: 142 MMOL/L (ref 136–145)
STJ: 3.31 CM
TARGETS BLD QL SMEAR: ABNORMAL
TDI LATERAL: 0.05 M/S
TDI SEPTAL: 0.05 M/S
TDI: 0.05 M/S
TR MAX PG: 43 MMHG
TRICUSPID ANNULAR PLANE SYSTOLIC EXCURSION: 1.5 CM
TV REST PULMONARY ARTERY PRESSURE: 51 MMHG
WBC # BLD AUTO: 4.01 K/UL (ref 3.9–12.7)

## 2023-03-27 PROCEDURE — 25000242 PHARM REV CODE 250 ALT 637 W/ HCPCS: Mod: NTX | Performed by: INTERNAL MEDICINE

## 2023-03-27 PROCEDURE — 83735 ASSAY OF MAGNESIUM: CPT | Mod: NTX | Performed by: INTERNAL MEDICINE

## 2023-03-27 PROCEDURE — 84100 ASSAY OF PHOSPHORUS: CPT | Mod: NTX | Performed by: INTERNAL MEDICINE

## 2023-03-27 PROCEDURE — 94761 N-INVAS EAR/PLS OXIMETRY MLT: CPT | Mod: NTX

## 2023-03-27 PROCEDURE — 20600001 HC STEP DOWN PRIVATE ROOM: Mod: NTX

## 2023-03-27 PROCEDURE — 99233 SBSQ HOSP IP/OBS HIGH 50: CPT | Mod: NTX,,, | Performed by: PHYSICIAN ASSISTANT

## 2023-03-27 PROCEDURE — 63600175 PHARM REV CODE 636 W HCPCS: Mod: NTX | Performed by: PHYSICIAN ASSISTANT

## 2023-03-27 PROCEDURE — 99233 PR SUBSEQUENT HOSPITAL CARE,LEVL III: ICD-10-PCS | Mod: NTX,,, | Performed by: PHYSICIAN ASSISTANT

## 2023-03-27 PROCEDURE — 25000003 PHARM REV CODE 250: Mod: NTX | Performed by: INTERNAL MEDICINE

## 2023-03-27 PROCEDURE — 25000003 PHARM REV CODE 250: Mod: NTX | Performed by: PHYSICIAN ASSISTANT

## 2023-03-27 PROCEDURE — 99900035 HC TECH TIME PER 15 MIN (STAT): Mod: NTX

## 2023-03-27 PROCEDURE — 36415 COLL VENOUS BLD VENIPUNCTURE: CPT | Mod: NTX | Performed by: INTERNAL MEDICINE

## 2023-03-27 PROCEDURE — 27000221 HC OXYGEN, UP TO 24 HOURS: Mod: NTX

## 2023-03-27 PROCEDURE — 84145 PROCALCITONIN (PCT): CPT | Mod: NTX | Performed by: INTERNAL MEDICINE

## 2023-03-27 PROCEDURE — 80053 COMPREHEN METABOLIC PANEL: CPT | Mod: NTX | Performed by: INTERNAL MEDICINE

## 2023-03-27 PROCEDURE — 25500020 PHARM REV CODE 255: Mod: NTX | Performed by: INTERNAL MEDICINE

## 2023-03-27 PROCEDURE — 85025 COMPLETE CBC W/AUTO DIFF WBC: CPT | Mod: NTX | Performed by: INTERNAL MEDICINE

## 2023-03-27 RX ORDER — FUROSEMIDE 10 MG/ML
80 INJECTION INTRAMUSCULAR; INTRAVENOUS ONCE
Status: COMPLETED | OUTPATIENT
Start: 2023-03-27 | End: 2023-03-27

## 2023-03-27 RX ORDER — POTASSIUM CHLORIDE 20 MEQ/1
40 TABLET, EXTENDED RELEASE ORAL ONCE
Status: COMPLETED | OUTPATIENT
Start: 2023-03-27 | End: 2023-03-27

## 2023-03-27 RX ADMIN — TIOTROPIUM BROMIDE INHALATION SPRAY 2 PUFF: 1.56 SPRAY, METERED RESPIRATORY (INHALATION) at 10:03

## 2023-03-27 RX ADMIN — AMIODARONE HYDROCHLORIDE 200 MG: 200 TABLET ORAL at 09:03

## 2023-03-27 RX ADMIN — ISOSORBIDE DINITRATE 30 MG: 20 TABLET ORAL at 09:03

## 2023-03-27 RX ADMIN — METOPROLOL SUCCINATE 50 MG: 50 TABLET, EXTENDED RELEASE ORAL at 09:03

## 2023-03-27 RX ADMIN — SACUBITRIL AND VALSARTAN 1 TABLET: 49; 51 TABLET, FILM COATED ORAL at 09:03

## 2023-03-27 RX ADMIN — HUMAN ALBUMIN MICROSPHERES AND PERFLUTREN 0.11 MG: 10; .22 INJECTION, SOLUTION INTRAVENOUS at 10:03

## 2023-03-27 RX ADMIN — PREGABALIN 25 MG: 25 CAPSULE ORAL at 09:03

## 2023-03-27 RX ADMIN — FUROSEMIDE 80 MG: 10 INJECTION, SOLUTION INTRAMUSCULAR; INTRAVENOUS at 10:03

## 2023-03-27 RX ADMIN — FUROSEMIDE 20 MG/HR: 10 INJECTION, SOLUTION INTRAMUSCULAR; INTRAVENOUS at 10:03

## 2023-03-27 RX ADMIN — ATORVASTATIN CALCIUM 40 MG: 40 TABLET, FILM COATED ORAL at 09:03

## 2023-03-27 RX ADMIN — POTASSIUM CHLORIDE 40 MEQ: 1500 TABLET, EXTENDED RELEASE ORAL at 09:03

## 2023-03-27 RX ADMIN — FERROUS SULFATE TAB 325 MG (65 MG ELEMENTAL FE) 1 EACH: 325 (65 FE) TAB at 09:03

## 2023-03-27 RX ADMIN — FLUTICASONE FUROATE AND VILANTEROL TRIFENATATE 1 PUFF: 200; 25 POWDER RESPIRATORY (INHALATION) at 10:03

## 2023-03-27 RX ADMIN — FUROSEMIDE 20 MG/HR: 10 INJECTION, SOLUTION INTRAMUSCULAR; INTRAVENOUS at 09:03

## 2023-03-27 RX ADMIN — PANTOPRAZOLE SODIUM 40 MG: 40 TABLET, DELAYED RELEASE ORAL at 09:03

## 2023-03-27 RX ADMIN — APIXABAN 5 MG: 5 TABLET, FILM COATED ORAL at 09:03

## 2023-03-27 RX ADMIN — PROMETHAZINE HYDROCHLORIDE AND CODEINE PHOSPHATE 5 ML: 6.25; 1 SOLUTION ORAL at 09:03

## 2023-03-27 RX ADMIN — MIRTAZAPINE 7.5 MG: 7.5 TABLET, FILM COATED ORAL at 09:03

## 2023-03-27 NOTE — HPI
Ms. Hawley is a 57 y.o. year old  female who was transported from OSH for management of ADHF. She has a pmx s/f NICM since 2013 s/p ICD, pulmonary hypertension, DM, HTN, permanent AF.  She presented to [facility] ED for symptoms of SOB, fatigue, and weakness x 3 days. She admits to difficulty breathing and more coughing if she lays on her right side. She reports her weight has been stable as far as she knows. She was recently transitioned from lasix 80 po bid to bumex 4 mg po bid about 11 days ago and reports compliance. She continues to urinate frequently. She also reports of daily n/v, twitching, restless legs, and chills that have been ongoing for months. She denies any recent changes to those symptoms. Denies any changes in dietary pattern or noncompliance.     At the OSH, she was found to be hemodynamically stable. Labs notable for bnp of 3800, troponin of 0.667 (similar to previous evaluation), creatinine of 2.4 (appears to be her baseline). CXR consistent with pulmonary congestion.  She received lasix 40 mg iv prior to transfer.     Regarding her advanced options, she was previously declined after committee meeting on 3/9/22 due to renal function, lung function and difficulty consistent medical follow up.  She saw Dr. Orozco on 3/14/23 for f/u visit as she was undergoing evaluation for heart-kidney transplant. There is unclear resolution about her chronic hypoxia and pulmonary status, but she is not yet listed from an HTS standpoint until further clarification. Following history taken from Dr. Orozco's recent clinic encounter.     She does not have her medications with her.  She is not have a medication list of her home medicines with her.  She does not know any of her medications by name except Lasix.  Was described as Lasix 80 mg twice daily but she reports she takes the morning dose but only use the evening dose if she feels the need to do so.  I asked her how she knows without a  scale she said she just bases this on her abdominal bloating and any edema.  She reports that her scale is broken and she has not gotten a new 1 so she has no daily weights to report.  She is accompanied by her granddaughter.  She tells me if she was approved for transplant her primary caregiver will be Jeanie Jaimes (sister and Laura Jaimes her niece.  She is going to stay in Laura's house after her transplant, if approved.     She saw Dr. Guardado in January 2023 at which time sent to the ED and admitted to [facility] 1/3/23.  She was treated for ADHF with IV lasix 80 mg IV BID and HTS consulted.  At that time it was noted that she had run out of metoprolol for 1 week PTA.  She was readmitted to [facility] few weeks later with ADHF and at discharge told to see HTS.  She came Feb 3, 2023 to see Dr. Guardado but was sent to ED with nausea and vomiting.  There treated with IV lasix and sent home from ED.  She comes today for f/u visit.  Of note per Dr. Guardado she was seen by Pulm in Jan 2023 and they felt that there was no overt evidence of ILD or COPD on PFTs and CT chest.  They felt that the etiology of her chronic hypoxic and hypercapnic respiratory failure was not clear.     I last saw her February 16, 2023 at which time she reported that her shortness of breath was stable and described NYHA class 3 symptoms.     On 2/27/2023 PFT's read as reduced FEV1/SVC ratio (66%) and low FEF 25-75 suggest mild obstruction on spirometry. Lung volumes show moderate restriction is present. Overall spirometry shows severe ventilatory impairment. DLCO is moderately decreased. MVV is severely decreased. On 2/27/2023 ABG 7.46/pCO2 was 39 and pO2 was 73 and these ABG's do not reflect the previously described chronic hypoxic and hypercapnic respiratory failure.     She saw Dr. Chacko February 27, 2023 note not yet signed but at end of current note he mentions his concern regarding PFT's and need to assure that she is optimized.  Her case  was reviewed with Dr. Chu Feb 27, 2023 and plan made for today's visit with PFT's and lab.

## 2023-03-27 NOTE — SUBJECTIVE & OBJECTIVE
Past Medical History:   Diagnosis Date    Anemia     Arthritis     Atrial fibrillation     OAC    Chronic respiratory failure with hypoxia, on home oxygen therapy     2L with activity, off at rest.  Per Pulm  no overt evidence of ILD or COPD on PFTs and CT to explain O2 needs.    CKD (chronic kidney disease), stage IV 05/08/2018    Congestive heart failure     s/p AICD placement,    Deep vein thrombosis     Depression     elevated bilirubin d/t Gilbert's syndrome     confirmed by [employer] genetic testing, evaluated by hepatology    Encounter for blood transfusion     GERD (gastroesophageal reflux disease)     Hypertension     Pheochromocytoma, malignant     Right kidney mass     Sleep apnea     Thalassemia trait, alpha     Thyroid disease     Type 2 diabetes mellitus with hyperglycemia, without long-term current use of insulin 08/13/2020       Past Surgical History:   Procedure Laterality Date    APPENDECTOMY      BONE MARROW BIOPSY      CARDIAC DEFIBRILLATOR PLACEMENT Left 12/2016    CARDIAC ELECTROPHYSIOLOGY MAPPING AND ABLATION      CARDIAC ELECTROPHYSIOLOGY MAPPING AND ABLATION      COLONOSCOPY N/A 5/6/2022    Procedure: COLONOSCOPY;  Surgeon: Arely Betancourt MD;  Location: [facility] ([address] [place] [address]);  Service: Endoscopy;  Laterality: N/A;  heart transplant candidate/ EF 25% - 2nd floor/ defib - Biotronik - ERW  Eliquis - per Dr. Cortez with [facility] [place], Pt ok to hold Eliquis x 2 days prior-see media tab-outside correspondence dated 12/30/21  - ERW  verbal instructions/portal instructions/email instructions - s    EYE SURGERY      due to running tears    FRACTURE SURGERY Left     hand 5th digit    HYSTERECTOMY      KNEE SURGERY Left 2016    hematoma    LIVER BIOPSY  10/24/2018    Minimal steatosis, predominantly macrovesicular, 1%, Minimal nonspecific portal inflammation, no fibrosis. No findings on biopsy to explain elevated bilirubin levels. Could be d/t Gilbert's =?- hemolysis    RIGHT HEART CATHETERIZATION Right  "12/07/2021    Procedure: INSERTION, CATHETER, RIGHT HEART;  Surgeon: Irving Cardenas MD;  Location: [facility] CATH LAB;  Service: Cardiology;  Laterality: Right;    RIGHT HEART CATHETERIZATION Right 12/19/2022    Procedure: INSERTION, CATHETER, RIGHT HEART;  Surgeon: Burke Camilo MD;  Location: [facility] CATH LAB;  Service: Cardiology;  Laterality: Right;    TRANSJUGULAR BIOPSY OF LIVER N/A 10/24/2018    Procedure: BIOPSY, LIVER, TRANSJUGULAR APPROACH;  [facility];  Location: [facility] OR [facility] FLR;  Service: Radiology;  Laterality: N/A;       Review of patient's allergies indicates:   Allergen Reactions    Penicillins Hives and Other (See Comments)    Iodinated contrast media Nausea And Vomiting    Oxycodone-acetaminophen Other (See Comments)     Nausea, Dizziness, Anxiety.  "I don't like how it makes me feel."   Given Hydromorphone 0.5mg IVP  Without problems.  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 Metabolism of other drugs not listed here may also be impacted by UGT1A1 enzyme activity.       Tramadol Nausea And Vomiting and Other (See Comments)     Other reaction(s): Other (See Comments)    Valsartan Other (See Comments)       Current Facility-Administered Medications   Medication    acetaminophen tablet 650 mg    albuterol-ipratropium 2.5 mg-0.5 mg/3 mL nebulizer solution 3 mL    allopurinol split tablet 100 mg    ALPRAZolam tablet 0.5 mg    " [START ON 3/27/2023] amiodarone tablet 200 mg    apixaban tablet 5 mg    atorvastatin tablet 40 mg    cetirizine tablet 10 mg    dextrose 10% bolus 125 mL 125 mL    dextrose 10% bolus 250 mL 250 mL    dextrose 40 % gel 15,000 mg    dextrose 40 % gel 30,000 mg    [START ON 3/27/2023] ferrous sulfate tablet 1 each    [START ON 3/27/2023] fluticasone furoate-vilanteroL 200-25 mcg/dose diskus inhaler 1 puff    fluticasone propionate 50 mcg/actuation nasal spray 100 mcg    furosemide injection 80 mg    glucagon (human recombinant) injection 1 mg    HYDROcodone-acetaminophen 5-325 mg per tablet 1 tablet    insulin aspart U-100 pen 0-5 Units    [START ON 3/27/2023] isosorbide dinitrate tablet 30 mg    [START ON 3/27/2023] metoprolol succinate (TOPROL-XL) 24 hr tablet 50 mg    mirtazapine tablet 7.5 mg    [START ON 3/27/2023] pantoprazole EC tablet 40 mg    pregabalin capsule 25 mg    sacubitriL-valsartan 49-51 mg per tablet 1 tablet    sodium chloride 0.9% flush 10 mL    [START ON 3/27/2023] tiotropium bromide 1.25 mcg/actuation inhaler 2 puff    traZODone tablet 50 mg     Facility-Administered Medications Ordered in Other Encounters   Medication    0.9%  NaCl infusion    vancomycin in dextrose 5 % 1 gram/250 mL IVPB 1,000 mg     Family History       Problem Relation (Age of Onset)    Cancer Mother    Cirrhosis Brother    Diabetes Brother    Heart attack Father    Heart disease Father, Sister, Brother, Sister, Brother    Hypertension Father, Brother          Tobacco Use    Smoking status: Never    Smokeless tobacco: Never   Substance and Sexual Activity    Alcohol use: Yes     Comment: up to 1 yr ago drank 2-3 drinks on occasion but sporadic    Drug use: No    Sexual activity: Yes     Partners: Male     Review of Systems   Constitutional:  Positive for chills and fatigue. Negative for activity change, appetite change and fever.   HENT:  Positive for congestion. Negative for dental problem.    Eyes:  Negative for pain,  discharge and itching.   Respiratory:  Positive for cough and shortness of breath. Negative for apnea, choking and chest tightness.    Cardiovascular:  Negative for chest pain, palpitations and leg swelling.   Gastrointestinal:  Positive for abdominal distention. Negative for abdominal pain.   Endocrine: Negative for cold intolerance and heat intolerance.   Genitourinary:  Negative for difficulty urinating and dysuria.   Musculoskeletal:  Negative for arthralgias and back pain.   Skin:  Negative for color change and pallor.   Neurological:  Negative for dizziness, facial asymmetry and headaches.   Hematological:  Negative for adenopathy. Does not bruise/bleed easily.   Psychiatric/Behavioral:  Negative for agitation and behavioral problems.    Objective:     Vital Signs (Most Recent):  Temp: 97.5 °F (36.4 °C) (03/26/23 2005)  Pulse: 66 (03/26/23 2005)  Resp: 16 (03/26/23 2005)  BP: 112/66 (03/26/23 2005)  SpO2: (!) 93 % (03/26/23 2005)   Vital Signs (24h Range):  Temp:  [96.9 °F (36.1 °C)-98.2 °F (36.8 °C)] 97.5 °F (36.4 °C)  Pulse:  [66-80] 66  Resp:  [16-19] 16  SpO2:  [92 %-96 %] 93 %  BP: (111-116)/(62-76) 112/66     No data found.  There is no height or weight on file to calculate BMI.    No intake or output data in the 24 hours ending 03/26/23 2119    Physical Exam  Constitutional:       General: She is not in acute distress.     Appearance: Normal appearance. She is well-developed.   HENT:      Head: Normocephalic and atraumatic.      Right Ear: External ear normal.      Left Ear: External ear normal.      Nose: Nose normal.   Eyes:      Conjunctiva/sclera: Conjunctivae normal.      Pupils: Pupils are equal, round, and reactive to light.   Neck:      Thyroid: No thyromegaly.      Comments: JVP not visible  Cardiovascular:      Rate and Rhythm: Normal rate and regular rhythm.      Heart sounds: Normal heart sounds. No murmur heard.    No friction rub. No gallop.   Pulmonary:      Effort: Pulmonary effort is  normal. No respiratory distress.      Breath sounds: Rales (faint bibasilar rales) present.   Abdominal:      General: Bowel sounds are normal. There is distension (mild).      Palpations: Abdomen is soft.      Tenderness: There is no abdominal tenderness.   Musculoskeletal:         General: No tenderness or deformity. Normal range of motion.      Cervical back: Normal range of motion and neck supple.   Skin:     General: Skin is warm and dry.   Neurological:      General: No focal deficit present.      Mental Status: She is alert and oriented to person, place, and time.   Psychiatric:         Mood and Affect: Mood normal.         Behavior: Behavior normal.       Significant Labs:  CBC:  Recent Labs   Lab 03/26/23  1326   WBC 4.95   RBC 5.90*   HGB 11.0*   HCT 35.9*      MCV 61*   MCH 18.6*   MCHC 30.6*     BNP:  Recent Labs   Lab 03/26/23  1326   BNP 3,780*     CMP:  Recent Labs   Lab 03/26/23  1326      CALCIUM 10.2   ALBUMIN 3.9   PROT 7.1      K 3.8   CO2 26      BUN 50*   CREATININE 2.4*   ALKPHOS 51*   ALT 16   AST 19   BILITOT 3.1*      Coagulation:   No results for input(s): PT, INR, APTT in the last 168 hours.  LDH:  No results for input(s): LDH in the last 72 hours.  Microbiology:  Microbiology Results (last 7 days)       ** No results found for the last 168 hours. **            BMP:   Recent Labs   Lab 03/26/23  1326         K 3.8      CO2 26   BUN 50*   CREATININE 2.4*   CALCIUM 10.2     Cardiac Markers: No results for input(s): CKMB, TROPONINT, MYOGLOBIN in the last 72 hours.  Coagulation: No results for input(s): PT, INR, APTT in the last 72 hours.  Prealbumin: No results for input(s): PREALBUMIN in the last 72 hours.  I have reviewed all pertinent labs within the past 24 hours.

## 2023-03-27 NOTE — NURSING
Notified team of 8 beat run of [facility]. Patient describes symptoms of nervousness and leg twitching.

## 2023-03-27 NOTE — ASSESSMENT & PLAN NOTE
Advanced NICM with EF last documented to be 10%  Continue GDMT including entresto, aldactone. Will hold jardiance in house as it's non formulary  She not overtly volume overloaded, but she has faint crackles and symptoms of overload, so will proceed with IV diuresis  Will diurese with lasix lasix gtt at 20 mg/hr  Reassess daily  Cardiac diet  Strict in/out  Monitor electrolytes closely  Formal echo ordered    Patient previously turned down for advanced options due to advanced renal disease, lung function, and concern for compliance  She is now being considered for kidney/heart transplant  There is still unresolved issue with pulmonary assessment.  But she has abnormal PFTs, will have CT surgery review most recent PFT (from 3/15) to determine if they are still incompatible with OHTx candidacy.   She has pulm f/u next week

## 2023-03-27 NOTE — ASSESSMENT & PLAN NOTE
Advanced NICM with EF last documented to be 10%  Continue GDMT including entresto, aldactone. Will hold jardiance in house as it's non formulary  She is currently on entresto so BNP unreliable   She not overtly volume overloaded, but she has faint crackles and symptoms of overload, so will proceed with IV diuresis  Will diurese with lasix 80 iv bid  Reassess daily  Cardiac diet  Strict in/out  Monitor electrolytes closely  Formal echo ordered    Patient previously turned down for advanced options due to advanced renal disease, lung function, and concern for compliance  She is now being considered for kidney/heart transplant  There is still unresolved issue with pulmonary assessment.  Per patient, her pulmonologist is saying there is no problem or concern with her lungs  But she has abnormal PFTs   She has followup with him next week  Need pulmonology visit clinic encounter documentation

## 2023-03-27 NOTE — PLAN OF CARE
Patient AAOx4, VSS no c/o pain throughout shift. Patient is independent in ADLs. Placed patient on 2L nasal canula for low SPO2. Patient requested med for cough. See orders. All needs met. Call light in reach.     Problem: Adult Inpatient Plan of Care  Goal: Plan of Care Review  Outcome: Ongoing, Progressing  Goal: Patient-Specific Goal (Individualized)  Outcome: Ongoing, Progressing  Goal: Absence of Hospital-Acquired Illness or Injury  Outcome: Ongoing, Progressing  Goal: Optimal Comfort and Wellbeing  Outcome: Ongoing, Progressing  Goal: Readiness for Transition of Care  Outcome: Ongoing, Progressing     Problem: Diabetes Comorbidity  Goal: Blood Glucose Level Within Targeted Range  Outcome: Ongoing, Progressing
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
## 2023-03-27 NOTE — DISCHARGE SUMMARY
[facility] ([facility])  [facility] Medicine  Discharge Summary      Patient Name: Hafsa Hawley  MRN: [id number]  [facility]: [id number]  Patient Class: IP- Inpatient  Admission Date: 2/20/2023  Hospital Length of Stay: 2 days  Discharge Date and Time: 2/22/2023  2:20 PM  Attending Physician: No att. providers found   Discharging Provider: Colt Evans MD  Primary Care Provider: Cristobal Ann MD    Primary Care Team: Networked reference to record PCT     HPI:   HPI:     Hafsa Hawley is a 57 y.o. female   with a PMHx of NICM HFrEF (EF 10%) s/p AICD placement, Paroxysmal A Fib on Eliquis, Pulmonary Hypertension, Hypertension, Type 2 Diabetes Mellitus presenting with shortness of breath. She states that her dyspnea started a few days ago which she has been attempting to manage at home, but has had progressive worsening over the course of few days .  Her lasix was recently changed to bumex . She diuresed well with IV lasix             * No surgery found *      Hospital Course:   2/22.  Patient SOB abdominal swelling improving.  Patient diuresed 3liters since admission.  Plan for discharge after dose of IV lasix 80mg today and take bumex tonight.  Will decrease Jardiance from 25mg to 10mg qd due to GFR less than 30.  Will decrease entresto to half dose due to bump in creatinine.  Plan to follow up with Dr. Duran Friday with repeat labs.           Goals of Care Treatment Preferences:  Code Status: Full Code          What is most important right now is to focus on curative/life-prolongation (regardless of treatment burdens), remaining as independent as possible, extending life as long as possible, even it it means sacrificing quality.  Accordingly, we have decided that the best plan to meet the patient's goals includes continuing with treatment.      Consults:     Cardiac/Vascular  * Acute on chronic combined systolic and diastolic heart failure  Supplemental O2 via nasal canula; titrate O2 saturation to  >92%.  Serial Cardiac enzymes × 3 wnl .  Diuretic administration. Monitor electrolytes for hypokalemia and hypomagnesemia.  Cardiology Consultation.  2-D Echocardiogram for evaluation of left ventricle systolic function : EF 10 %  Daily weights.  Strict I/Os.  Fluid restriction.  Na restriction <2g/d.    She is currently on her baseline 2 liters nasal cannula with oxygen saturation 98%.  Patient improving with IV lasix 80mg and diuresed 3 liters since admission. Plan  discharge today. She will take her home bumex dose tonight.  Will decrease Jardiance from 25mg to 10mg qd.  Will decrease entresto to half dose due to bump in creatinine.  She will follow up with cardiology on Friday with bmp.          ICD (implantable cardioverter-defibrillator) in place        Paroxysmal atrial fibrillation  Patient with Paroxysmal (<7 days) atrial fibrillation which is controlled currently with Beta Blocker. Patient is currently in sinus rhythm.Anticoagulation indicated. Anticoagulation done with eliquis.        Essential hypertension    Resume enteresto and metoprolol    Elevated troponin  Mildly elevated and stable   Cardiology not concerned with ACS at this time       Renal/  CKD (chronic kidney disease), stage IV  Monitor 2/24 due to creatinine increase from 2.1 to 2.3.      Endocrine  Type 2 diabetes mellitus with stage 4 chronic kidney disease, with long-term current use of insulin  Patient's FSGs are controlled on current medication regimen.  Last A1c reviewed-   Lab Results   Component Value Date    HGBA1C 4.6 01/30/2023     Most recent fingerstick glucose reviewed-   Recent Labs   Lab 02/21/23  1701 02/21/23  2121 02/22/23  0708 02/22/23  1104   POCTGLUCOSE 76 81 85 133*     Current correctional scale  Low  Maintain anti-hyperglycemic dose as follows-   Antihyperglycemics (From admission, onward)    None        Hold Oral hypoglycemics while patient is in the hospital.    Discharge home with Jardiance 10mg qd.       GI  elevated bilirubin d/t Gilbert's syndrome  CMP  Sodium   Date Value Ref Range Status   02/22/2023 140 136 - 145 mmol/L Final     Potassium   Date Value Ref Range Status   02/22/2023 4.0 3.5 - 5.1 mmol/L Final     Chloride   Date Value Ref Range Status   02/22/2023 105 95 - 110 mmol/L Final     CO2   Date Value Ref Range Status   02/22/2023 24 23 - 29 mmol/L Final     Glucose   Date Value Ref Range Status   02/22/2023 67 (L) 70 - 110 mg/dL Final     BUN   Date Value Ref Range Status   02/22/2023 46 (H) 6 - 20 mg/dL Final     Creatinine   Date Value Ref Range Status   02/22/2023 2.3 (H) 0.5 - 1.4 mg/dL Final     Calcium   Date Value Ref Range Status   02/22/2023 8.8 8.7 - 10.5 mg/dL Final     Total Protein   Date Value Ref Range Status   02/21/2023 6.3 6.0 - 8.4 g/dL Final     Albumin   Date Value Ref Range Status   02/21/2023 3.6 3.5 - 5.2 g/dL Final     Total Bilirubin   Date Value Ref Range Status   02/21/2023 1.6 (H) 0.1 - 1.0 mg/dL Final     Comment:     For infants and newborns, interpretation of results should be based  on gestational age, weight and in agreement with clinical  observations.    Premature Infant recommended reference ranges:  Up to 24 hours.............<8.0 mg/dL  Up to 48 hours............<12.0 mg/dL  3-5 days..................<15.0 mg/dL  6-29 days.................<15.0 mg/dL       Alkaline Phosphatase   Date Value Ref Range Status   02/21/2023 54 (L) 55 - 135 U/L Final     AST   Date Value Ref Range Status   02/21/2023 23 10 - 40 U/L Final     ALT   Date Value Ref Range Status   02/21/2023 35 10 - 44 U/L Final     Anion Gap   Date Value Ref Range Status   02/22/2023 11 8 - 16 mmol/L Final     eGFR   Date Value Ref Range Status   02/22/2023 24 (A) >60 mL/min/1.73 m^2 Final           Other  MELISSA (obstructive sleep apnea)  Follow up outpatient for CPAP machine       Final Active Diagnoses:    Diagnosis Date Noted POA    PRINCIPAL PROBLEM:  Acute on chronic combined systolic and diastolic  heart failure [I50.42] 03/16/2018 Yes     Chronic    Aortic atherosclerosis [I70.0] 02/22/2023 Yes    Pulmonary hypertension [I27.20] 02/22/2023 Yes    Class 1 obesity due to excess calories without serious comorbidity in adult [E66.09] 02/22/2023 No    NSVT (nonsustained ventricular tachycardia) [I47.29] 11/02/2022 Yes    Type 2 diabetes mellitus with stage 4 chronic kidney disease, with long-term current use of insulin [E11.22, N18.4, Z79.4] 08/13/2020 Yes    elevated bilirubin d/t Gilbert's syndrome [E80.4]  Yes    Paroxysmal supraventricular tachycardia [I47.1] 08/20/2018 Yes     Chronic    ICD (implantable cardioverter-defibrillator) in place [Z95.810] 07/24/2018 Yes     Chronic    CKD (chronic kidney disease), stage IV [N18.4] 05/08/2018 Yes     Chronic    Depression due to physical illness [F06.31] 05/08/2018 Yes    Paroxysmal atrial fibrillation [I48.0] 08/25/2016 Yes     Chronic    MELISSA (obstructive sleep apnea) [G47.33] 08/23/2016 Yes     Chronic    Essential hypertension [I10] 07/22/2016 Yes     Chronic    Elevated troponin [R77.8] 07/20/2016 Yes      Problems Resolved During this Admission:       Discharged Condition: good    Disposition: Home or Self Care    Follow Up:   Follow-up Information     Dom Montero MD. Go on 2/24/2023.    Specialties: Cardiology, Interventional Cardiology  Why: at 10:40AM  Contact information:  [address] [facility] [address]  [phone]             Cristobal Ann MD. Go on 3/3/2023.    Specialty: Family Medicine  Why: Hospital Follow-up at 1pm  Contact information:  [address] [facility] [address]  [phone]                       Patient Instructions:      Comprehensive Metabolic Panel   Standing Status: Future Standing Exp. Date: 02/22/24     CBC Auto Differential   Standing Status: Future Standing Exp. Date: 02/22/24       Significant Diagnostic Studies: Labs:   CMP   Recent Labs   Lab 03/26/23  1326 03/27/23  0650    142   K 3.8  3.7    104   CO2 26 26    86   BUN 50* 51*   CREATININE 2.4* 2.1*   CALCIUM 10.2 9.7   PROT 7.1 6.3   ALBUMIN 3.9 3.6   BILITOT 3.1* 3.0*   ALKPHOS 51* 46*   AST 19 18   ALT 16 16   ANIONGAP 14 12   , CBC   Recent Labs   Lab 03/26/23  1326 03/27/23  0650   WBC 4.95 4.01   HGB 11.0* 10.6*   HCT 35.9* 37.7    164    and Troponin   Recent Labs   Lab 03/26/23  1326   TROPONINI 0.667*       Pending Diagnostic Studies:     None         Medications:  Reconciled Home Medications:      Medication List      CHANGE how you take these medications    ergocalciferol 50,000 unit Cap  Commonly known as: ERGOCALCIFEROL  Take 1 capsule (50,000 Units total) by mouth every 7 days.  What changed: when to take this        CONTINUE taking these medications    albuterol-ipratropium 2.5 mg-0.5 mg/3 mL nebulizer solution  Commonly known as: DUO-NEB  Take 3 mLs by nebulization 2 (two) times a day.     allopurinoL 100 MG tablet  Commonly known as: ZYLOPRIM  Take 1 tablet (100 mg total) by mouth daily as needed (gout attack).     ALPRAZolam 2 MG Tab  Commonly known as: XANAX  Take 0.25-2 mg by mouth 2 (two) times daily as needed (anxiety).     apixaban 5 mg Tab  Commonly known as: ELIQUIS  Take 1 tablet (5 mg total) by mouth 2 (two) times daily.     atorvastatin 40 MG tablet  Commonly known as: LIPITOR  Take 1 tablet (40 mg total) by mouth every evening.     b complex vitamins tablet  Take 1 tablet by mouth once daily.     blood sugar diagnostic Strp  Commonly known as: ACCU-CHEK GUIDE TEST STRIPS  1 strip by Other route 3 (three) times daily.     bumetanide 2 MG tablet  Commonly known as: BUMEX  Take 2 tablets (4 mg total) by mouth 2 (two) times a day.     cetirizine 10 MG tablet  Commonly known as: ZYRTEC  Take 10 mg by mouth daily as needed for Allergies.     diclofenac sodium 1 % Gel  Commonly known as: VOLTAREN  APPLY 2 G TOPICALLY 3 (THREE) TIMES DAILY AS NEEDED (PAIN).     DULERA 200-5 mcg/actuation inhaler  Generic  drug: mometasone-formoterol  Inhale 2 puffs into the lungs 2 (two) times daily. Controller     ferrous sulfate 325 (65 FE) MG EC tablet  Take 1 tablet (325 mg total) by mouth once daily.     fluticasone propionate 50 mcg/actuation nasal spray  Commonly known as: FLONASE  2 sprays by Each Nostril route daily as needed for Rhinitis.     isosorbide dinitrate 30 MG Tab  Commonly known as: ISORDIL  Take 1 tablet (30 mg total) by mouth 3 (three) times daily.     lancets Misc  Commonly known as: ACCU-CHEK SOFTCLIX LANCETS  1 Device by Misc.(Non-Drug; Combo Route) route 3 (three) times daily.     LIDOcaine 5 %  Commonly known as: LIDODERM  Place 1 patch onto the skin daily as needed (pain). Remove & Discard patch within 12 hours or as directed by MD     metoprolol succinate 50 MG 24 hr tablet  Commonly known as: TOPROL-XL  Take 1 tablet (50 mg total) by mouth once daily.     NITROSTAT 0.4 MG SL tablet  Generic drug: nitroGLYCERIN  Take 2.5 tablets (1 mg total) by mouth every 5 (five) minutes as needed for Chest pain. No more than 3 tablets in 15 minutes.     pantoprazole 40 MG tablet  Commonly known as: PROTONIX  TAKE 1 TABLET BY MOUTH DAILY IN THE MORNING     potassium chloride 10 MEQ Cpsr  Commonly known as: MICRO-K  Take 1 capsule (10 mEq total) by mouth once daily.     pregabalin 25 MG capsule  Commonly known as: LYRICA  Take 1 capsule (25 mg total) by mouth 2 (two) times daily.     scopolamine 1.3-1.5 mg (1 mg over 3 days)  Commonly known as: TRANSDERM-SCOP  Place 1 patch onto the skin every 72 hours as needed (nausea).     SPIRIVA RESPIMAT 1.25 mcg/actuation inhaler  Generic drug: tiotropium bromide  Inhale 2 puffs into the lungs once daily. Controller     VENTOLIN HFA 90 mcg/actuation inhaler  Generic drug: albuterol  Inhale 2 puffs into the lungs every 6 (six) hours as needed for Shortness of Breath or Wheezing.        STOP taking these medications    ENTRESTO  mg per tablet  Generic drug:  sacubitriL-valsartan     JARDIANCE 25 mg tablet  Generic drug: empagliflozin     promethazine-codeine 6.25-10 mg/5 ml 6.25-10 mg/5 mL syrup  Commonly known as: PHENERGAN with CODEINE            Indwelling Lines/Drains at time of discharge:   Lines/Drains/Airways     None                 Time spent on the discharge of patient: 20 minutes         Colt Evans MD  Department of Hospital Medicine  [facility] (3rd Fl)

## 2023-03-27 NOTE — SUBJECTIVE & OBJECTIVE
"Interval History: Patient admitted last night for ADHF. Diuresed some on IVP lasix (1.4L documented UOP.) She still notes some abdominal swelling and breathing worse than baseline. Will increase her to lasix gtt at 20 mg/hr.     Continuous Infusions:   furosemide (LASIX) 2 mg/mL continuous infusion (non-titrating) 20 mg/hr (03/27/23 1035)     Scheduled Meds:   amiodarone  200 mg Oral Daily    apixaban  5 mg Oral BID    atorvastatin  40 mg Oral QHS    ferrous sulfate  1 tablet Oral Daily    fluticasone furoate-vilanteroL  1 puff Inhalation Daily    isosorbide dinitrate  30 mg Oral TID    metoprolol succinate  50 mg Oral Daily    mirtazapine  7.5 mg Oral QHS    pantoprazole  40 mg Oral Daily    pregabalin  25 mg Oral BID    sacubitriL-valsartan  1 tablet Oral BID    tiotropium bromide  2.5 mcg Inhalation Daily     PRN Meds:acetaminophen, albuterol sulfate **AND** ipratropium, allopurinoL, ALPRAZolam, cetirizine, dextrose 10%, dextrose 10%, dextrose, dextrose, fluticasone propionate, glucagon (human recombinant), HYDROcodone-acetaminophen, insulin aspart U-100, promethazine-codeine 6.25-10 mg/5 ml, sodium chloride 0.9%, traZODone    Review of patient's allergies indicates:   Allergen Reactions    Penicillins Hives and Other (See Comments)    Iodinated contrast media Nausea And Vomiting    Oxycodone-acetaminophen Other (See Comments)     Nausea, Dizziness, Anxiety.  "I don't like how it makes me feel."   Given Hydromorphone 0.5mg IVP  Without problems.  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   Other drugs that have been demonstrated " to be impacted by homozygosity for the UGT1A1 TA7 promoter variant include pazopanib, nilotinib, atazanavir, and belinostat. Metabolism of other drugs not listed here may also be impacted by UGT1A1 enzyme activity.       Tramadol Nausea And Vomiting and Other (See Comments)     Other reaction(s): Other (See Comments)    Valsartan Other (See Comments)     Objective:     Vital Signs (Most Recent):  Temp: 98 °F (36.7 °C) (03/27/23 1142)  Pulse: 65 (03/27/23 1142)  Resp: 18 (03/27/23 1142)  BP: 110/69 (03/27/23 1142)  SpO2: 98 % (03/27/23 1142) Vital Signs (24h Range):  Temp:  [96.9 °F (36.1 °C)-98 °F (36.7 °C)] 98 °F (36.7 °C)  Pulse:  [65-77] 65  Resp:  [14-19] 18  SpO2:  [90 %-98 %] 98 %  BP: (110-135)/(62-83) 110/69     Patient Vitals for the past 72 hrs (Last 3 readings):   Weight   03/27/23 0800 85 kg (187 lb 6.3 oz)   03/26/23 2130 86.2 kg (190 lb 0.6 oz)   03/26/23 2005 90 kg (198 lb 6.6 oz)     Body mass index is 30.25 kg/m².      Intake/Output Summary (Last 24 hours) at 3/27/2023 1308  Last data filed at 3/27/2023 0350  Gross per 24 hour   Intake --   Output 1400 ml   Net -1400 ml       Hemodynamic Parameters:       Telemetry: NSR with frequent PVC and occasional NSVT    Physical Exam  Constitutional:       Appearance: Normal appearance.   HENT:      Head: Normocephalic and atraumatic.   Neck:      Comments: Not able to visualize JVP  Cardiovascular:      Rate and Rhythm: Normal rate and regular rhythm.      Pulses: Normal pulses.      Heart sounds: Normal heart sounds.   Pulmonary:      Effort: Pulmonary effort is normal. No respiratory distress.      Breath sounds: Rales present. No wheezing.   Abdominal:      General: There is distension.      Palpations: Abdomen is soft.   Musculoskeletal:      Cervical back: Normal range of motion and neck supple.      Right lower leg: No edema.      Left lower leg: No edema.   Skin:     General: Skin is warm and dry.   Neurological:      General: No focal deficit  present.      Mental Status: She is alert and oriented to person, place, and time.   Psychiatric:         Mood and Affect: Mood normal.         Behavior: Behavior normal.       Significant Labs:  CBC:  Recent Labs   Lab 03/26/23  1326 03/27/23  0650   WBC 4.95 4.01   RBC 5.90* 6.09*   HGB 11.0* 10.6*   HCT 35.9* 37.7    164   MCV 61* 62*   MCH 18.6* 17.4*   MCHC 30.6* 28.1*     BNP:  Recent Labs   Lab 03/26/23  1326   BNP 3,780*     CMP:  Recent Labs   Lab 03/26/23  1326 03/27/23  0650    86   CALCIUM 10.2 9.7   ALBUMIN 3.9 3.6   PROT 7.1 6.3    142   K 3.8 3.7   CO2 26 26    104   BUN 50* 51*   CREATININE 2.4* 2.1*   ALKPHOS 51* 46*   ALT 16 16   AST 19 18   BILITOT 3.1* 3.0*      Coagulation:   No results for input(s): PT, INR, APTT in the last 168 hours.  LDH:  No results for input(s): LDH in the last 72 hours.  Microbiology:  Microbiology Results (last 7 days)       ** No results found for the last 168 hours. **            I have reviewed all pertinent labs within the past 24 hours.    Estimated Creatinine Clearance: 32.5 mL/min (A) (based on SCr of 2.1 mg/dL (H)).    Diagnostic Results:  I have reviewed all pertinent imaging results/findings within the past 24 hours.
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
## 2023-03-28 LAB
ACANTHOCYTES BLD QL SMEAR: PRESENT
ALBUMIN SERPL BCP-MCNC: 3.6 G/DL (ref 3.5–5.2)
ALP SERPL-CCNC: 50 U/L (ref 55–135)
ALT SERPL W/O P-5'-P-CCNC: 15 U/L (ref 10–44)
ANION GAP SERPL CALC-SCNC: 12 MMOL/L (ref 8–16)
ANISOCYTOSIS BLD QL SMEAR: ABNORMAL
AST SERPL-CCNC: 15 U/L (ref 10–40)
BASOPHILS # BLD AUTO: 0.03 K/UL (ref 0–0.2)
BASOPHILS NFR BLD: 0.7 % (ref 0–1.9)
BILIRUB SERPL-MCNC: 2 MG/DL (ref 0.1–1)
BUN SERPL-MCNC: 56 MG/DL (ref 6–20)
CALCIUM SERPL-MCNC: 9.2 MG/DL (ref 8.7–10.5)
CHLORIDE SERPL-SCNC: 101 MMOL/L (ref 95–110)
CO2 SERPL-SCNC: 29 MMOL/L (ref 23–29)
CREAT SERPL-MCNC: 2.4 MG/DL (ref 0.5–1.4)
DACRYOCYTES BLD QL SMEAR: ABNORMAL
DIFFERENTIAL METHOD: ABNORMAL
EOSINOPHIL # BLD AUTO: 0.1 K/UL (ref 0–0.5)
EOSINOPHIL NFR BLD: 3.3 % (ref 0–8)
ERYTHROCYTE [DISTWIDTH] IN BLOOD BY AUTOMATED COUNT: 28.8 % (ref 11.5–14.5)
EST. GFR  (NO RACE VARIABLE): 23 ML/MIN/1.73 M^2
GIANT PLATELETS BLD QL SMEAR: PRESENT
GLUCOSE SERPL-MCNC: 100 MG/DL (ref 70–110)
HCT VFR BLD AUTO: 36.8 % (ref 37–48.5)
HGB BLD-MCNC: 10.9 G/DL (ref 12–16)
IMM GRANULOCYTES # BLD AUTO: 0.02 K/UL (ref 0–0.04)
IMM GRANULOCYTES NFR BLD AUTO: 0.5 % (ref 0–0.5)
LYMPHOCYTES # BLD AUTO: 1.1 K/UL (ref 1–4.8)
LYMPHOCYTES NFR BLD: 25.4 % (ref 18–48)
MAGNESIUM SERPL-MCNC: 2.1 MG/DL (ref 1.6–2.6)
MCH RBC QN AUTO: 18.4 PG (ref 27–31)
MCHC RBC AUTO-ENTMCNC: 29.6 G/DL (ref 32–36)
MCV RBC AUTO: 62 FL (ref 82–98)
MONOCYTES # BLD AUTO: 0.5 K/UL (ref 0.3–1)
MONOCYTES NFR BLD: 12.7 % (ref 4–15)
NEUTROPHILS # BLD AUTO: 2.4 K/UL (ref 1.8–7.7)
NEUTROPHILS NFR BLD: 57.4 % (ref 38–73)
NRBC BLD-RTO: 2 /100 WBC
OVALOCYTES BLD QL SMEAR: ABNORMAL
PHOSPHATE SERPL-MCNC: 5.2 MG/DL (ref 2.7–4.5)
PLATELET # BLD AUTO: 166 K/UL (ref 150–450)
PLATELET BLD QL SMEAR: ABNORMAL
PMV BLD AUTO: ABNORMAL FL (ref 9.2–12.9)
POCT GLUCOSE: 101 MG/DL (ref 70–110)
POCT GLUCOSE: 122 MG/DL (ref 70–110)
POCT GLUCOSE: 122 MG/DL (ref 70–110)
POCT GLUCOSE: 140 MG/DL (ref 70–110)
POIKILOCYTOSIS BLD QL SMEAR: ABNORMAL
POTASSIUM SERPL-SCNC: 4.2 MMOL/L (ref 3.5–5.1)
PROT SERPL-MCNC: 6.3 G/DL (ref 6–8.4)
RBC # BLD AUTO: 5.94 M/UL (ref 4–5.4)
SCHISTOCYTES BLD QL SMEAR: ABNORMAL
SCHISTOCYTES BLD QL SMEAR: PRESENT
SODIUM SERPL-SCNC: 142 MMOL/L (ref 136–145)
SPHEROCYTES BLD QL SMEAR: ABNORMAL
TARGETS BLD QL SMEAR: ABNORMAL
WBC # BLD AUTO: 4.18 K/UL (ref 3.9–12.7)

## 2023-03-28 PROCEDURE — 99233 SBSQ HOSP IP/OBS HIGH 50: CPT | Mod: NTX,,, | Performed by: PHYSICIAN ASSISTANT

## 2023-03-28 PROCEDURE — 83735 ASSAY OF MAGNESIUM: CPT | Mod: NTX | Performed by: INTERNAL MEDICINE

## 2023-03-28 PROCEDURE — 25000003 PHARM REV CODE 250: Mod: NTX | Performed by: INTERNAL MEDICINE

## 2023-03-28 PROCEDURE — 97165 OT EVAL LOW COMPLEX 30 MIN: CPT | Mod: NTX

## 2023-03-28 PROCEDURE — 36415 COLL VENOUS BLD VENIPUNCTURE: CPT | Mod: NTX | Performed by: INTERNAL MEDICINE

## 2023-03-28 PROCEDURE — 20600001 HC STEP DOWN PRIVATE ROOM: Mod: NTX

## 2023-03-28 PROCEDURE — 84100 ASSAY OF PHOSPHORUS: CPT | Mod: NTX | Performed by: INTERNAL MEDICINE

## 2023-03-28 PROCEDURE — 94761 N-INVAS EAR/PLS OXIMETRY MLT: CPT | Mod: NTX

## 2023-03-28 PROCEDURE — 99900035 HC TECH TIME PER 15 MIN (STAT): Mod: NTX

## 2023-03-28 PROCEDURE — 97535 SELF CARE MNGMENT TRAINING: CPT | Mod: NTX

## 2023-03-28 PROCEDURE — 27000221 HC OXYGEN, UP TO 24 HOURS: Mod: NTX

## 2023-03-28 PROCEDURE — 97161 PT EVAL LOW COMPLEX 20 MIN: CPT | Mod: NTX

## 2023-03-28 PROCEDURE — 94640 AIRWAY INHALATION TREATMENT: CPT | Mod: NTX

## 2023-03-28 PROCEDURE — 97116 GAIT TRAINING THERAPY: CPT | Mod: NTX

## 2023-03-28 PROCEDURE — 80053 COMPREHEN METABOLIC PANEL: CPT | Mod: NTX | Performed by: INTERNAL MEDICINE

## 2023-03-28 PROCEDURE — 85025 COMPLETE CBC W/AUTO DIFF WBC: CPT | Mod: NTX | Performed by: INTERNAL MEDICINE

## 2023-03-28 PROCEDURE — 99233 PR SUBSEQUENT HOSPITAL CARE,LEVL III: ICD-10-PCS | Mod: NTX,,, | Performed by: PHYSICIAN ASSISTANT

## 2023-03-28 RX ORDER — ONDANSETRON 4 MG/1
8 TABLET, ORALLY DISINTEGRATING ORAL EVERY 6 HOURS PRN
Status: DISCONTINUED | OUTPATIENT
Start: 2023-03-28 | End: 2023-03-29 | Stop reason: HOSPADM

## 2023-03-28 RX ORDER — PROMETHAZINE HYDROCHLORIDE 12.5 MG/1
12.5 TABLET ORAL EVERY 6 HOURS PRN
Status: DISCONTINUED | OUTPATIENT
Start: 2023-03-28 | End: 2023-03-29 | Stop reason: HOSPADM

## 2023-03-28 RX ADMIN — SACUBITRIL AND VALSARTAN 1 TABLET: 49; 51 TABLET, FILM COATED ORAL at 08:03

## 2023-03-28 RX ADMIN — TIOTROPIUM BROMIDE INHALATION SPRAY 2 PUFF: 1.56 SPRAY, METERED RESPIRATORY (INHALATION) at 08:03

## 2023-03-28 RX ADMIN — FLUTICASONE FUROATE AND VILANTEROL TRIFENATATE 1 PUFF: 200; 25 POWDER RESPIRATORY (INHALATION) at 08:03

## 2023-03-28 RX ADMIN — PANTOPRAZOLE SODIUM 40 MG: 40 TABLET, DELAYED RELEASE ORAL at 08:03

## 2023-03-28 RX ADMIN — MIRTAZAPINE 7.5 MG: 7.5 TABLET, FILM COATED ORAL at 08:03

## 2023-03-28 RX ADMIN — ONDANSETRON 8 MG: 8 TABLET, ORALLY DISINTEGRATING ORAL at 06:03

## 2023-03-28 RX ADMIN — PREGABALIN 25 MG: 25 CAPSULE ORAL at 08:03

## 2023-03-28 RX ADMIN — PROMETHAZINE HYDROCHLORIDE AND CODEINE PHOSPHATE 5 ML: 6.25; 1 SOLUTION ORAL at 08:03

## 2023-03-28 RX ADMIN — ISOSORBIDE DINITRATE 30 MG: 20 TABLET ORAL at 08:03

## 2023-03-28 RX ADMIN — ATORVASTATIN CALCIUM 40 MG: 40 TABLET, FILM COATED ORAL at 08:03

## 2023-03-28 RX ADMIN — AMIODARONE HYDROCHLORIDE 200 MG: 200 TABLET ORAL at 08:03

## 2023-03-28 RX ADMIN — FERROUS SULFATE TAB 325 MG (65 MG ELEMENTAL FE) 1 EACH: 325 (65 FE) TAB at 08:03

## 2023-03-28 RX ADMIN — APIXABAN 5 MG: 5 TABLET, FILM COATED ORAL at 08:03

## 2023-03-28 RX ADMIN — METOPROLOL SUCCINATE 50 MG: 50 TABLET, EXTENDED RELEASE ORAL at 08:03

## 2023-03-28 NOTE — PLAN OF CARE
Problem: Physical Therapy  Goal: Physical Therapy Goal  Description: Goals to be met by: 23    Patient will increase functional independence with mobility by performin. Gait  x 300 feet with [place]  2. Ascend/descend 6 stair with right  handrails Modified [place] using LRAD as needed  3. Lower extremity exercise program x15 reps per handout, with independence    Outcome: Ongoing, Progressing     Pt tolerated PT session well.      All needs met, all questions answered.
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
## 2023-03-28 NOTE — PROGRESS NOTES
Admit Note     Heart Transplant  spoke with patient by phone on 3/27/203 in order to assess needs. Pt agreed to complete assessment by phone.  Role of  was explained. Pt is familiar with the heart transplant social workers from pervious psychosocial.      Patient is a 57 y.o.  female, admitted for chills , type 2 diabetes mellitus with stage 4 kidney disease, acute on chronic combined systolic and diastolic heart failure, nonischemic cardiomyopathy, paroxysmal atrial fibrillation, essential hypertension, and microcytic anemia.   Per medical record the pt's advanced options was declined on 3/9/22 due to medical reasons.    At this time, patient presents via phone as alert and oriented x 4, calm, and communicative.  At this time, patients caregiver is not currently in attendance.    Household/Family Systems     Patient resides with patient's spouse and 17 yr old grandchild, Shavonne , at    [address] [place] [address].      Support system includes pt's sister, Jeanie Jaimes- pt's primary caregiver for LVAD/transplant, the pt's two nieces, Laura and Beulah--secondary caregivers. Pt indicated possible difficulty at home, but continues to list spouse as an emergency contact. Pt indicated her address may change soon. No additional information provided.   Patient does have dependents that are in need of being cared for. Patient's 17 yr old granddaughter  are being cared for by pt's spouse and extended family as needed.  Pt has two adult children, the pt's daughter is pregnant and experiencing some medical challenges and the pt's son is currently experiencing difficulty with mental health and legal concerns.        Patients primary caregiver is self with support from family.    Pt's cell: [phone]    Emergency contacts:    Erika Estrada (daughter, HCPA, lives in [place]) [phone]    Jeanie Jaimes (sister, drives, lives in [place]) [phone]    Laura Jaimes (niece, drives,  lives in [place] and works) [phone]    Beulah Hawley (niece, lives in [place], [employer] and works) [phone]    Jerrod Hawley (spouse, drives) [phone]           During admission, patient's caregiver plans to stay at home.  Confirmed patient and patients caregivers  have access to reliable transportation.    Cognitive Status/Learning     Patient reports reading ability as college and states patient does not have difficulty with reading, writing, seeing, hearing, and learning. Pt reports some difficulty with memory/comprehension possibly due to stress/anxiety.   Pt did not want to explore further.    Patient reports patient learns best by reading.     Needed: No.   Highest education level: Attended College/Technical School    Vocation/Disability   .  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 Disability started in 2014 due to multiple medical reasons per pt.  The pt's spouse is no longer a  and is currently unemployed and receiving SS disability.   Limited income: pt receives $914 per month. Pt receives SNAP.      Adherence     Patient reports a medium level of adherence to patients health care regimen.  Pt reports she feels she is keeping up with all of her MD visits, and is following her diet.  The pt repots difficulty at times getting her RX from her pharmacy and the RX of multiple medications.  Pt reports she does take all of her medications.   Adherence counseling and education provided. Patient verbalizes understanding.    Substance Use    Patient reports the following substance usage.    Tobacco: none, patient denies any use.  Alcohol: none, patient denies any use.  Illicit Drugs/Non-prescribed Medications: none, patient denies any use.  Patient states clear understanding of the potential impact of substance use.  Substance abstinence/cessation counseling, education and resources provided and reviewed.      Services Utilizing/ADLS    Infusion Service: Prior to admission, patient utilizing? no  Home Health: Prior to admission, patient utilizing? no  DME: Prior to admission, yes pt has home 02 set up with a home concentrator and portables. Pt reports 02 is set at 2LPM and the 02 company is [facility].  Pt reports she uses her 02 day and night as needed. Pt also has a rollater and shower-chair.  Pt reports she needs to do additional testing to get a different CPAP machine.   Pulmonary/Cardiac Rehab: Prior to admission, no  Dialysis:  Prior to admission, no  Transplant Specialty Pharmacy:  Prior to admission, no.    Prior to admission, patient reports patient was somewhat independent with ADLS and was driving (limited). Pt reports she doesn't leave her home often and feels better staying home.     Patient reports patient is not able to care for self at this time due to compromised medical condition (as documented in medical record) and physical weakness..  Patient indicates a willingness to care for self once medically cleared to do so.    Insurance/Medications    Insured by   Payer/Plan Subscr  Sex Relation Sub. Ins. ID Effective Group Num   1. MEDICAID - AM* [employer] 1965 Female Self [id number]                                   [address]      Primary Insurance (for UNOS reporting): Public Insurance - Medicaid  Secondary Insurance (for UNOS reporting): None    Patient reports patient is able to obtain and afford medications at this time and at time of discharge.    Living Will/Healthcare Power of     Patient states patient has a LW and/or HCPA.   provided education regarding LW and HCPA and the completion of forms.    Coping/Mental Health    Patient reports she's coping adequately with hospital stay and is communicating with family by phone.   Patient indicates mental health difficulties.   Pt reports difficulty with anxiety due to her own medical needs along  with the difficulties her daughter and son are experiencing.    Pt has a history of suicide attempt (12-13 ) years ago. Pt did not state any suicidal/ Homicidal difficulty.  Pt did not discuss if she's currently  taking Zoloft, however pt reported use on psychosocial.  Worker provided active listening, encouragement, and support given hospital admission.       Discharge Planning    At time of discharge, patient plans to return to patient's home under the care of self, spouse and extended family.  Patients spouse will transport patient.  Per rounds today, expected discharge date has not been medically determined at this time. Patient and patients caregiver  verbalize understanding and are involved in treatment planning and discharge process.    Additional Concerns    Patient is being followed for needs, education, resources, information, emotional support, supportive counseling, and for supportive and skilled discharge plan of care.  providing ongoing psychosocial support, education, resources and d/c planning as needed.  SW remains available.  remains available. Patient verbalizes understanding and agreement with information reviewed, social work availability, and how to access available resources as needed.

## 2023-03-28 NOTE — ASSESSMENT & PLAN NOTE
Advanced NICM with EF last documented to be 10%  Continue GDMT including entresto, aldactone. Will hold jardiance in house as it's non formulary  She not overtly volume overloaded, but she has faint crackles and symptoms of overload, so will proceed with IV lasix  Will diurese with lasix lasix gtt at 20 mg/hr. Plan for RHC tomorrow given unclear volume status  Reassess daily  Cardiac diet  Strict in/out  Monitor electrolytes closely      Patient previously turned down for advanced options due to advanced renal disease, lung function, and concern for compliance  She is now being considered for kidney/heart transplant  There is still unresolved issue with pulmonary assessment.  But she has abnormal PFTs, will have CT surgery review most recent PFT (from 3/15)   She has pulm f/u next week

## 2023-03-28 NOTE — PT/OT/SLP EVAL
"Occupational Therapy   Evaluation and Discharge Note    Name: Hafsa Hawley  MRN: [id number]  Admitting Diagnosis: <principal problem not specified>  Recent Surgery: Procedure(s) (LRB):  INSERTION, CATHETER, RIGHT HEART (Right)      Recommendations:     Discharge Recommendations: other (see comments), home  Discharge Equipment Recommendations: none  Barriers to discharge:  None    Assessment:     Hafsa Hawley is a 57 y.o. female with a medical diagnosis of <principal problem not specified>. At this time, patient is functioning at their prior level of function and does not require further acute OT services.     Plan:     During this hospitalization, patient does not require further acute OT services.  Please re-consult if situation changes.    Plan of Care Reviewed with: patient    Subjective     Chief Complaint: "its just been harder to move because my legs are swollen, i've been mostly staying in the house"   Patient/Family Comments/goals:  maximize return to PLOF    Occupational Profile:  Living Environment: pt lives with granddaughter in [facility], 5 NIRAJ, R HR, tubshower w shower chair  Previous level of function: Mod I with rollator in community, no AD in home. Pt states that she uses rollator and shower chair more in past 2 weeks 2* increased BLE swelling and generalized fatigue  Roles and Routines: limited 2* debility and fatigue, enjoys spending time with family  Drives: yes   Equipment Used at home: rollator, shower chair  Assistance upon Discharge: family no 24/7    Pain/Comfort:  Pain Rating 1: 7/10  Location - Side 1: Bilateral  Location - Orientation 1: generalized  Location 1: foot  Pain Addressed 1: Pre-medicate for activity, Reposition, Distraction  Pain Rating Post-Intervention 1: 7/10    Patients cultural, spiritual, Holiness conflicts given the current situation: no    Objective:     Communicated with: RN prior to session.  Patient found HOB elevated with peripheral IV, telemetry, oxygen upon OT " entry to room.    General Precautions: Standard, fall  Orthopedic Precautions: N/A  Braces: N/A  Respiratory Status: Nasal cannula, flow 3 L/min     Occupational Performance:    Bed Mobility:    Patient completed Rolling/Turning to Left with  modified independence  Patient completed Supine to Sit with modified independence    Functional Mobility/Transfers:  Patient completed Sit <> Stand Transfer with modified independence  with  rolling walker   Patient completed Bed <> Chair Transfer using Step Transfer technique with supervision with rolling walker  Functional Mobility: pt ambulated in halls with supervision and RW, RW provided for room, pt instructed to ambulate with RN staff x2 daily     Activities of Daily Living:  Feeding:  independence    Grooming: independence    Upper Body Dressing: modified independence set up seated EOB  Lower Body Dressing: modified independence set up donnign socks seated EOB    Cognitive/Visual Perceptual:  AxOx4, vision and cognitive function WFL, patient cooperative and appropriate.   Pt with reading glasses worn/present during eval.       Physical Exam:  BUE WFL for strength, sensation, GM/FM for use during functional tasks.  Pt is R handed.      AMPAC 6 Click ADL:  AMPAC Total Score: 24    Treatment & Education:  -OT POC, safety during ADLs and mobility   -Education on energy conservation and task modification to maximize safety and independence  -Questions answered within OT scope of practice.      Patient left up in chair with all lines intact, call button in reach, RN notified, and RW provided for room    GOALS:   Multidisciplinary Problems       Occupational Therapy Goals       Not on file              Multidisciplinary Problems (Resolved)          Problem: Occupational Therapy    Goal Priority Disciplines Outcome Interventions   Occupational Therapy Goal   (Resolved)     OT, PT/OT Met                        History:     Past Medical History:   Diagnosis Date    Anemia      Arthritis     Atrial fibrillation     OAC    Chronic respiratory failure with hypoxia, on home oxygen therapy     2L with activity, off at rest.  Per Pulm  no overt evidence of ILD or COPD on PFTs and CT to explain O2 needs.    CKD (chronic kidney disease), stage IV 05/08/2018    Congestive heart failure     s/p AICD placement,    Deep vein thrombosis     Depression     elevated bilirubin d/t Gilbert's syndrome     confirmed by [place] genetic testing, evaluated by hepatology    Encounter for blood transfusion     GERD (gastroesophageal reflux disease)     Hypertension     Pheochromocytoma, malignant     Right kidney mass     Sleep apnea     Thalassemia trait, alpha     Thyroid disease     Type 2 diabetes mellitus with hyperglycemia, without long-term current use of insulin 08/13/2020         Past Surgical History:   Procedure Laterality Date    APPENDECTOMY      BONE MARROW BIOPSY      CARDIAC DEFIBRILLATOR PLACEMENT Left 12/2016    CARDIAC ELECTROPHYSIOLOGY MAPPING AND ABLATION      CARDIAC ELECTROPHYSIOLOGY MAPPING AND ABLATION      COLONOSCOPY N/A 5/6/2022    Procedure: COLONOSCOPY;  Surgeon: Arely Betancourt MD;  Location: [facility] ([address] [place] [address]);  Service: Endoscopy;  Laterality: N/A;  heart transplant candidate/ EF 25% - 2nd floor/ defib - Biotronik - ERW  Eliquis - per Dr. Cortez with [facility] [place], Pt ok to hold Eliquis x 2 days prior-see media tab-outside correspondence dated 12/30/21  - ERW  verbal instructions/portal instructions/email instructions - s    EYE SURGERY      due to running tears    FRACTURE SURGERY Left     hand 5th digit    HYSTERECTOMY      KNEE SURGERY Left 2016    hematoma    LIVER BIOPSY  10/24/2018    Minimal steatosis, predominantly macrovesicular, 1%, Minimal nonspecific portal inflammation, no fibrosis. No findings on biopsy to explain elevated bilirubin levels. Could be d/t Gilbert's =?- hemolysis    RIGHT HEART CATHETERIZATION Right 12/07/2021    Procedure: INSERTION, CATHETER, RIGHT  HEART;  Surgeon: Irving Cardenas MD;  Location: [facility] CATH LAB;  Service: Cardiology;  Laterality: Right;    RIGHT HEART CATHETERIZATION Right 12/19/2022    Procedure: INSERTION, CATHETER, RIGHT HEART;  Surgeon: Burke Camilo MD;  Location: [facility] CATH LAB;  Service: Cardiology;  Laterality: Right;    TRANSJUGULAR BIOPSY OF LIVER N/A 10/24/2018    Procedure: BIOPSY, LIVER, TRANSJUGULAR APPROACH;  Surgeon: Carmen Diagnostic Provider;  Location: [facility] OR [address] [place] [address];  Service: Radiology;  Laterality: N/A;       Time Tracking:     OT Date of Treatment: 03/28/23  OT Start Time: 0952  OT Stop Time: 1016  OT Total Time (min): 24 min    Billable Minutes:Evaluation 10  Self Care/Home Management 14    3/28/2023

## 2023-03-28 NOTE — PT/OT/SLP EVAL
Physical Therapy Co-Evaluation and Co-Treatment    Patient Name:  Hafsa Hawley   MRN:  [id number]    Recommendations:     Discharge Recommendations:  other (see comments) (home)   Discharge Equipment Recommendations: none   Barriers to discharge: decreased functional mobility, fall risk, decreased caregiver support, and inaccessible home    Assessment:     Hafsa Hawley is a 57 y.o. female admitted with a medical diagnosis of <principal problem not specified>.  Pt demonstrates the below listed impairments with decreased tolerance to functional mobility, impaired endurance, and pain being the most limiting.  Pt demonstrates fair tolerance to out of bed mobility and is willing to ambulate in the hallway.  Pt requires skilled PT due to patient's status as: a fall risk to allow safe d/c home.     Impairments and functional limitations:  weakness, impaired endurance, impaired self care skills, impaired functional mobility, gait instability, impaired balance, impaired cardiopulmonary response to activity.  These deficits affect their roles and responsibilities in which they were able to complete prior to admit.  Rehab Prognosis:   Good ; patient would benefit from acute skilled PT services 2 x/week to address these deficits, improve quality of life, focus on recovery of impairments, provide patient/caregiver education, reduce fall risk, and reach maximum level of function.  Pt is highly  motivated to participated in skilled PT.    Recent Surgery:   Procedure(s) (LRB):  INSERTION, CATHETER, RIGHT HEART (Right)      Plan:     During this hospitalization, patient to be seen 2 x/week to address the identified rehab impairments via gait training, therapeutic activities, therapeutic exercises, neuromuscular re-education and progress toward the following goals:    Plan of Care Expires:  04/27/23    Subjective     Chief Complaint: decreased tolerance to functional mobility  Patient/Family Comments/Goals: Return  home  Pain/Comfort:  Pain Rating 1: 7/10  Location - Side 1: Bilateral  Location - Orientation 1: generalized  Location 1: foot  Pain Addressed 1: Reposition, Distraction  Pain Rating Post-Intervention 1:  (not rated)    Patients cultural, spiritual, Yazdanism conflicts given the current situation: no    Living Environment:  Patient lives with their grandchild in single story home, 5 steps with Right  hand rail, tub/shower.   Pt utilizes No AD for ambulation of all distances.  Has a rollator PRN  Prior to admission, patient was independent with ADLs.   DME owned: rollator, shower chair.   DME not currently used: n/a.   Upon discharge, patient will have assistance from family with no 24/7 assist.     Objective:     Communicated with nursing prior to session.  Patient found HOB elevated with telemetry, peripheral IV, oxygen  upon PT entry to room.    General Precautions: Standard, fall   Orthopedic Precautions:N/A   Braces: N/A   Oxygen Device:      Exams:  Cognitive Exam:  Patient is oriented to Person, Place, Time, and Situation  Command following: Patient follows 100% of commands   RLE ROM: WFL  RLE Strength: grossly 4/5  LLE ROM: WFL  LLE Strength: grossly 4/5  Postural Exam:  Patient presented with the following abnormalities:    -       Rounded shoulders  -       Forward head  Sensation:    -       Intact    Functional Mobility:  Bed Mobility:  Rolling Left: MOD I  Scooting: MOD I  Supine to Sit: MOD I  Head of bed position: HOB elevated    Transfers:  Sit to Stand: MOD I with No AD    Gait: Patient ambulated 140' with No AD and Sup. Patient demonstrates decreased step length and decreased von. All lines remained intact throughout ambulation trial, mask donned for out of room ambulation, portable Supplemental O2 3L utilized.    Balance:   Position Score Time   Static Sitting GOOD: Takes MODERATE challenges n/a   Dynamic Sitting FAIR: Maintains without assist, but is unable to take any challenges n/a    Static Standing FAIR: Maintains without assist, but is unable to take any challenges n/a   Dynamic Standing FAIR-: Maintains without assist but is inconsistent n/a     Therapeutic Activities:  Patient educated on role of acute care PT and PT POC, safety while in hospital including calling nurse for mobility, call light usage, benefits of out of bed mobility, breathing technique, fall risk, bed mobility , transfers, gait technique, positioning, posture, risks of prolonged bed rest, possible discharge disposition , and benefits of continued PT by explanation and demonstration.    Patient demonstrates good understanding of education provided this day.   Whiteboard updated    Therapeutic Exercises:  n/a    AM-PAC 6 CLICK MOBILITY  Total Score:21     Patient left up in chair with all lines intact, call button in reach, and RN notified.    GOALS:   Multidisciplinary Problems       Physical Therapy Goals          Problem: Physical Therapy    Goal Priority Disciplines Outcome Goal Variances Interventions   Physical Therapy Goal     PT, PT/OT Ongoing, Progressing     Description: Goals to be met by: 23    Patient will increase functional independence with mobility by performin. Gait  x 300 feet with [place]  2. Ascend/descend 6 stair with right  handrails Modified [place] using LRAD as needed  3. Lower extremity exercise program x15 reps per handout, with independence                         History:     Past Medical History:   Diagnosis Date    Anemia     Arthritis     Atrial fibrillation     OAC    Chronic respiratory failure with hypoxia, on home oxygen therapy     2L with activity, off at rest.  Per Pulm  no overt evidence of ILD or COPD on PFTs and CT to explain O2 needs.    CKD (chronic kidney disease), stage IV 2018    Congestive heart failure     s/p AICD placement,    Deep vein thrombosis     Depression     elevated bilirubin d/t Gilbert's syndrome     confirmed by [place] genetic testing,  evaluated by hepatology    Encounter for blood transfusion     GERD (gastroesophageal reflux disease)     Hypertension     Pheochromocytoma, malignant     Right kidney mass     Sleep apnea     Thalassemia trait, alpha     Thyroid disease     Type 2 diabetes mellitus with hyperglycemia, without long-term current use of insulin 08/13/2020       Past Surgical History:   Procedure Laterality Date    APPENDECTOMY      BONE MARROW BIOPSY      CARDIAC DEFIBRILLATOR PLACEMENT Left 12/2016    CARDIAC ELECTROPHYSIOLOGY MAPPING AND ABLATION      CARDIAC ELECTROPHYSIOLOGY MAPPING AND ABLATION      COLONOSCOPY N/A 5/6/2022    Procedure: COLONOSCOPY;  Surgeon: Arely Betancourt MD;  Location: [facility] ENDO (2ND FLR);  Service: Endoscopy;  Laterality: N/A;  heart transplant candidate/ EF 25% - 2nd floor/ defib - Biotronik - ERW  Eliquis - per Dr. Cortez with [facility] [place], Pt ok to hold Eliquis x 2 days prior-see media tab-outside correspondence dated 12/30/21  - ERW  verbal instructions/portal instructions/email instructions - s    EYE SURGERY      due to running tears    FRACTURE SURGERY Left     hand 5th digit    HYSTERECTOMY      KNEE SURGERY Left 2016    hematoma    LIVER BIOPSY  10/24/2018    Minimal steatosis, predominantly macrovesicular, 1%, Minimal nonspecific portal inflammation, no fibrosis. No findings on biopsy to explain elevated bilirubin levels. Could be d/t Gilbert's =?- hemolysis    RIGHT HEART CATHETERIZATION Right 12/07/2021    Procedure: INSERTION, CATHETER, RIGHT HEART;  Surgeon: Irving Cardenas MD;  Location: [facility] CATH LAB;  Service: Cardiology;  Laterality: Right;    RIGHT HEART CATHETERIZATION Right 12/19/2022    Procedure: INSERTION, CATHETER, RIGHT HEART;  Surgeon: Burke Camilo MD;  Location: [facility] CATH LAB;  Service: Cardiology;  Laterality: Right;    TRANSJUGULAR BIOPSY OF LIVER N/A 10/24/2018    Procedure: BIOPSY, LIVER, TRANSJUGULAR APPROACH;  Surgeon: Rickey Diagnostic Provider;  Location: [facility] OR  2ND FLR;  Service: Radiology;  Laterality: N/A;       Time Tracking:     PT Received On: 03/28/23  PT Start Time: 0953     PT Stop Time: 1015  PT Total Time (min): 22 min     Billable Minutes: Evaluation 10 and Gait Training 11    03/28/2023    Co-treatment performed for this visit due to patient need for two skilled therapists to ensure patient and staff safety, to accommodate for patient activity tolerance/pain management, and maximize functional potential.

## 2023-03-28 NOTE — PLAN OF CARE
A&Ox4, Per telemetry, pt had a run of 6 beats of Vtach @ 2305 and a run of 10 beats around 0130. Pt asymoptomatic. Primary sx notified. BPs remain soft but all other VSS. On 2-3L O2 via NC to maintain sats >90%. Denied any discomfort. IV medications continue as ordered. Discharge planning ongoing.

## 2023-03-28 NOTE — PLAN OF CARE
Problem: Occupational Therapy  Goal: Occupational Therapy Goal  Outcome: Met       Once medically stable, pt safe to dc home with no further OT needs anticipated.   Pt safe to ambulate with RN assist daily.

## 2023-03-28 NOTE — SUBJECTIVE & OBJECTIVE
"Interval History: NAEON. No complaints. Patient on lasix gtt 20 mg/hr and reports feeling better and less bloated.      Continuous Infusions:   furosemide (LASIX) 2 mg/mL continuous infusion (non-titrating) 20 mg/hr (03/27/23 2118)     Scheduled Meds:   amiodarone  200 mg Oral Daily    atorvastatin  40 mg Oral QHS    ferrous sulfate  1 tablet Oral Daily    fluticasone furoate-vilanteroL  1 puff Inhalation Daily    isosorbide dinitrate  30 mg Oral TID    metoprolol succinate  50 mg Oral Daily    mirtazapine  7.5 mg Oral QHS    pantoprazole  40 mg Oral Daily    pregabalin  25 mg Oral BID    sacubitriL-valsartan  1 tablet Oral BID    tiotropium bromide  2.5 mcg Inhalation Daily     PRN Meds:acetaminophen, albuterol sulfate **AND** ipratropium, allopurinoL, ALPRAZolam, cetirizine, dextrose 10%, dextrose 10%, dextrose, dextrose, fluticasone propionate, glucagon (human recombinant), HYDROcodone-acetaminophen, insulin aspart U-100, promethazine-codeine 6.25-10 mg/5 ml, sodium chloride 0.9%, traZODone    Review of patient's allergies indicates:   Allergen Reactions    Penicillins Hives and Other (See Comments)    Iodinated contrast media Nausea And Vomiting    Oxycodone-acetaminophen Other (See Comments)     Nausea, Dizziness, Anxiety.  "I don't like how it makes me feel."   Given Hydromorphone 0.5mg IVP  Without problems.  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 Metabolism " of other drugs not listed here may also be impacted by UGT1A1 enzyme activity.       Tramadol Nausea And Vomiting and Other (See Comments)     Other reaction(s): Other (See Comments)    Valsartan Other (See Comments)     Objective:     Vital Signs (Most Recent):  Temp: 98.5 °F (36.9 °C) (03/28/23 0735)  Pulse: 77 (03/28/23 0843)  Resp: 20 (03/28/23 0843)  BP: 109/71 (03/28/23 0735)  SpO2: (!) 94 % (03/28/23 0843) Vital Signs (24h Range):  Temp:  [97.4 °F (36.3 °C)-98.5 °F (36.9 °C)] 98.5 °F (36.9 °C)  Pulse:  [62-77] 77  Resp:  [16-20] 20  SpO2:  [91 %-98 %] 94 %  BP: ()/(58-75) 109/71     Patient Vitals for the past 72 hrs (Last 3 readings):   Weight   03/28/23 0355 90.9 kg (200 lb 6.4 oz)   03/27/23 0800 85 kg (187 lb 6.3 oz)   03/26/23 2130 86.2 kg (190 lb 0.6 oz)       Body mass index is 32.35 kg/m².      Intake/Output Summary (Last 24 hours) at 3/28/2023 1036  Last data filed at 3/28/2023 0531  Gross per 24 hour   Intake 942 ml   Output 1450 ml   Net -508 ml         Hemodynamic Parameters:       Telemetry: NSR with frequent PVC and occasional NSVT    Physical Exam  Constitutional:       Appearance: Normal appearance.   HENT:      Head: Normocephalic and atraumatic.   Neck:      Comments: Not able to visualize JVP  Cardiovascular:      Rate and Rhythm: Normal rate and regular rhythm.      Pulses: Normal pulses.      Heart sounds: Normal heart sounds.   Pulmonary:      Effort: Pulmonary effort is normal. No respiratory distress.      Breath sounds: Rales present. No wheezing.   Abdominal:      General: There is distension.      Palpations: Abdomen is soft.   Musculoskeletal:      Cervical back: Normal range of motion and neck supple.      Right lower leg: No edema.      Left lower leg: No edema.   Skin:     General: Skin is warm and dry.   Neurological:      General: No focal deficit present.      Mental Status: She is alert and oriented to person, place, and time.   Psychiatric:         Mood and Affect:  Mood normal.         Behavior: Behavior normal.       Significant Labs:  CBC:  Recent Labs   Lab 03/26/23  1326 03/27/23  0650 03/28/23  0349   WBC 4.95 4.01 4.18   RBC 5.90* 6.09* 5.94*   HGB 11.0* 10.6* 10.9*   HCT 35.9* 37.7 36.8*    164 166   MCV 61* 62* 62*   MCH 18.6* 17.4* 18.4*   MCHC 30.6* 28.1* 29.6*       BNP:  Recent Labs   Lab 03/26/23  1326   BNP 3,780*       CMP:  Recent Labs   Lab 03/26/23  1326 03/27/23  0650 03/28/23  0349    86 100   CALCIUM 10.2 9.7 9.2   ALBUMIN 3.9 3.6 3.6   PROT 7.1 6.3 6.3    142 142   K 3.8 3.7 4.2   CO2 26 26 29    104 101   BUN 50* 51* 56*   CREATININE 2.4* 2.1* 2.4*   ALKPHOS 51* 46* 50*   ALT 16 16 15   AST 19 18 15   BILITOT 3.1* 3.0* 2.0*        Coagulation:   No results for input(s): PT, INR, APTT in the last 168 hours.  LDH:  No results for input(s): LDH in the last 72 hours.  Microbiology:  Microbiology Results (last 7 days)       ** No results found for the last 168 hours. **            I have reviewed all pertinent labs within the past 24 hours.    Estimated Creatinine Clearance: 29.4 mL/min (A) (based on SCr of 2.4 mg/dL (H)).    Diagnostic Results:  I have reviewed all pertinent imaging results/findings within the past 24 hours.

## 2023-03-29 ENCOUNTER — TELEPHONE (OUTPATIENT)
Dept: TRANSPLANT | Facility: CLINIC | Age: 58
End: 2023-03-29
Payer: MEDICAID

## 2023-03-29 ENCOUNTER — COMMITTEE REVIEW (OUTPATIENT)
Dept: TRANSPLANT | Facility: CLINIC | Age: 58
End: 2023-03-29
Payer: MEDICAID

## 2023-03-29 VITALS
HEART RATE: 76 BPM | BODY MASS INDEX: 32.2 KG/M2 | WEIGHT: 200.38 LBS | TEMPERATURE: 98 F | RESPIRATION RATE: 17 BRPM | OXYGEN SATURATION: 92 % | SYSTOLIC BLOOD PRESSURE: 103 MMHG | HEIGHT: 66 IN | DIASTOLIC BLOOD PRESSURE: 65 MMHG

## 2023-03-29 LAB
ALBUMIN SERPL BCP-MCNC: 3.6 G/DL (ref 3.5–5.2)
ALP SERPL-CCNC: 49 U/L (ref 55–135)
ALT SERPL W/O P-5'-P-CCNC: 16 U/L (ref 10–44)
ANION GAP SERPL CALC-SCNC: 10 MMOL/L (ref 8–16)
AST SERPL-CCNC: 18 U/L (ref 10–40)
BASOPHILS # BLD AUTO: 0.02 K/UL (ref 0–0.2)
BASOPHILS NFR BLD: 0.5 % (ref 0–1.9)
BILIRUB SERPL-MCNC: 1.7 MG/DL (ref 0.1–1)
BUN SERPL-MCNC: 63 MG/DL (ref 6–20)
CALCIUM SERPL-MCNC: 9.3 MG/DL (ref 8.7–10.5)
CHLORIDE SERPL-SCNC: 100 MMOL/L (ref 95–110)
CO2 SERPL-SCNC: 29 MMOL/L (ref 23–29)
CREAT SERPL-MCNC: 2.6 MG/DL (ref 0.5–1.4)
DIFFERENTIAL METHOD: ABNORMAL
EOSINOPHIL # BLD AUTO: 0.2 K/UL (ref 0–0.5)
EOSINOPHIL NFR BLD: 4.4 % (ref 0–8)
ERYTHROCYTE [DISTWIDTH] IN BLOOD BY AUTOMATED COUNT: 30.9 % (ref 11.5–14.5)
EST. GFR  (NO RACE VARIABLE): 20.9 ML/MIN/1.73 M^2
GLUCOSE SERPL-MCNC: 96 MG/DL (ref 70–110)
HCT VFR BLD AUTO: 38.1 % (ref 37–48.5)
HGB BLD-MCNC: 10.7 G/DL (ref 12–16)
IMM GRANULOCYTES # BLD AUTO: 0.02 K/UL (ref 0–0.04)
IMM GRANULOCYTES NFR BLD AUTO: 0.5 % (ref 0–0.5)
LYMPHOCYTES # BLD AUTO: 1 K/UL (ref 1–4.8)
LYMPHOCYTES NFR BLD: 24.4 % (ref 18–48)
MAGNESIUM SERPL-MCNC: 2.1 MG/DL (ref 1.6–2.6)
MCH RBC QN AUTO: 17.5 PG (ref 27–31)
MCHC RBC AUTO-ENTMCNC: 28.1 G/DL (ref 32–36)
MCV RBC AUTO: 63 FL (ref 82–98)
MONOCYTES # BLD AUTO: 0.7 K/UL (ref 0.3–1)
MONOCYTES NFR BLD: 15.2 % (ref 4–15)
NEUTROPHILS # BLD AUTO: 2.4 K/UL (ref 1.8–7.7)
NEUTROPHILS NFR BLD: 55 % (ref 38–73)
NRBC BLD-RTO: 1 /100 WBC
PHOSPHATE SERPL-MCNC: 5.6 MG/DL (ref 2.7–4.5)
PLATELET # BLD AUTO: 166 K/UL (ref 150–450)
PMV BLD AUTO: ABNORMAL FL (ref 9.2–12.9)
POCT GLUCOSE: 123 MG/DL (ref 70–110)
POCT GLUCOSE: 211 MG/DL (ref 70–110)
POTASSIUM SERPL-SCNC: 4.3 MMOL/L (ref 3.5–5.1)
PROT SERPL-MCNC: 6.4 G/DL (ref 6–8.4)
RBC # BLD AUTO: 6.1 M/UL (ref 4–5.4)
SODIUM SERPL-SCNC: 139 MMOL/L (ref 136–145)
WBC # BLD AUTO: 4.27 K/UL (ref 3.9–12.7)

## 2023-03-29 PROCEDURE — 63600175 PHARM REV CODE 636 W HCPCS: Mod: NTX | Performed by: INTERNAL MEDICINE

## 2023-03-29 PROCEDURE — 63600175 PHARM REV CODE 636 W HCPCS: Mod: NTX | Performed by: PHYSICIAN ASSISTANT

## 2023-03-29 PROCEDURE — C1894 INTRO/SHEATH, NON-LASER: HCPCS | Mod: NTX | Performed by: INTERNAL MEDICINE

## 2023-03-29 PROCEDURE — 83735 ASSAY OF MAGNESIUM: CPT | Mod: NTX | Performed by: INTERNAL MEDICINE

## 2023-03-29 PROCEDURE — 93451 RIGHT HEART CATH: CPT | Mod: NTX | Performed by: INTERNAL MEDICINE

## 2023-03-29 PROCEDURE — 27000221 HC OXYGEN, UP TO 24 HOURS: Mod: NTX

## 2023-03-29 PROCEDURE — 25000003 PHARM REV CODE 250: Mod: NTX | Performed by: PHYSICIAN ASSISTANT

## 2023-03-29 PROCEDURE — 85025 COMPLETE CBC W/AUTO DIFF WBC: CPT | Mod: NTX | Performed by: INTERNAL MEDICINE

## 2023-03-29 PROCEDURE — C1751 CATH, INF, PER/CENT/MIDLINE: HCPCS | Mod: NTX | Performed by: INTERNAL MEDICINE

## 2023-03-29 PROCEDURE — 93451 PR RIGHT HEART CATH O2 SATURATION & CARDIAC OUTPUT: ICD-10-PCS | Mod: 26,NTX,, | Performed by: INTERNAL MEDICINE

## 2023-03-29 PROCEDURE — 93451 RIGHT HEART CATH: CPT | Mod: 26,NTX,, | Performed by: INTERNAL MEDICINE

## 2023-03-29 PROCEDURE — 25000003 PHARM REV CODE 250: Mod: NTX | Performed by: INTERNAL MEDICINE

## 2023-03-29 PROCEDURE — 94640 AIRWAY INHALATION TREATMENT: CPT | Mod: NTX

## 2023-03-29 PROCEDURE — 99900035 HC TECH TIME PER 15 MIN (STAT): Mod: NTX

## 2023-03-29 PROCEDURE — 84100 ASSAY OF PHOSPHORUS: CPT | Mod: NTX | Performed by: INTERNAL MEDICINE

## 2023-03-29 PROCEDURE — 80053 COMPREHEN METABOLIC PANEL: CPT | Mod: NTX | Performed by: INTERNAL MEDICINE

## 2023-03-29 PROCEDURE — 36415 COLL VENOUS BLD VENIPUNCTURE: CPT | Mod: NTX | Performed by: INTERNAL MEDICINE

## 2023-03-29 RX ORDER — BUMETANIDE 2 MG/1
2 TABLET ORAL 2 TIMES DAILY
Qty: 60 TABLET | Refills: 11 | Status: ON HOLD | OUTPATIENT
Start: 2023-03-29 | End: 2023-05-02 | Stop reason: HOSPADM

## 2023-03-29 RX ADMIN — FLUTICASONE FUROATE AND VILANTEROL TRIFENATATE 1 PUFF: 200; 25 POWDER RESPIRATORY (INHALATION) at 08:03

## 2023-03-29 RX ADMIN — FERROUS SULFATE TAB 325 MG (65 MG ELEMENTAL FE) 1 EACH: 325 (65 FE) TAB at 09:03

## 2023-03-29 RX ADMIN — TIOTROPIUM BROMIDE INHALATION SPRAY 2 PUFF: 1.56 SPRAY, METERED RESPIRATORY (INHALATION) at 08:03

## 2023-03-29 RX ADMIN — PREGABALIN 25 MG: 25 CAPSULE ORAL at 09:03

## 2023-03-29 RX ADMIN — SACUBITRIL AND VALSARTAN 1 TABLET: 49; 51 TABLET, FILM COATED ORAL at 09:03

## 2023-03-29 RX ADMIN — AMIODARONE HYDROCHLORIDE 200 MG: 200 TABLET ORAL at 09:03

## 2023-03-29 RX ADMIN — PANTOPRAZOLE SODIUM 40 MG: 40 TABLET, DELAYED RELEASE ORAL at 09:03

## 2023-03-29 RX ADMIN — ONDANSETRON 8 MG: 8 TABLET, ORALLY DISINTEGRATING ORAL at 11:03

## 2023-03-29 RX ADMIN — FUROSEMIDE 20 MG/HR: 10 INJECTION, SOLUTION INTRAMUSCULAR; INTRAVENOUS at 05:03

## 2023-03-29 RX ADMIN — METOPROLOL SUCCINATE 50 MG: 50 TABLET, EXTENDED RELEASE ORAL at 09:03

## 2023-03-29 RX ADMIN — INSULIN ASPART 2 UNITS: 100 INJECTION, SOLUTION INTRAVENOUS; SUBCUTANEOUS at 08:03
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
## 2023-03-29 NOTE — PROGRESS NOTES
Discharge Note:  SW met with pt to discuss her dc today.  Pt was tearful and expressed concern about her f/up plans.  Pt stated that she is confused about what the plan is for her care now.  TONYA contacted heart coordinator (Kezia) to f/up with pt to discuss plan and pt's concerns.    Pt reported that her spouse is coming to pick her up for dc today.  SW inquired about pt's portable o2 tank.  Pt stated that her spouse is not bringing her oxygen tank due to forgetting at home and he had already left to come get her.  [facility] has been contacted and brought pt a portable tank for her dc home today.       No other dc needs indicated by pt and team at this time.  TONYA informed pt that Kezia will be in touch about plan of care.  Pt voiced understanding and agreement.

## 2023-03-29 NOTE — NURSING
Patient being discharged home today, discharge medications sent to home [facility], home O2 oxygen tank delivered to bedside, iv/heart monitor removed, AVS given with all questions/concerns answered, transport placed to take patient down for discharge.

## 2023-03-29 NOTE — ASSESSMENT & PLAN NOTE
Advanced NICM with EF last documented to be 10%  Continue GDMT including entresto, aldactone. Will hold jardiance in house as it's non formulary  She not overtly volume overloaded, but she has faint crackles and symptoms of overload, so will proceed with IV lasix  RHC from 3/29 Filling pressures: RA 9, PCWP 16 PA 58/29, mean PA pressure 39 mmHg PVR 5.6 BOLDEN, consistent with pre and post-capillary pulmonary hypertension. Gillian CO/CI: 4.1/2.0  Reassess daily  Cardiac diet  Strict in/out  Monitor electrolytes closely    Will discharge home today on lower dose of diuretic (2 mg bumex bid instead of the 4 mg bid she has been taking) will instruct her to resume the bumex tomorrow.       Patient previously turned down for advanced options due to advanced renal disease, lung function, and concern for compliance  She is now being considered for kidney/heart transplant  There is still unresolved issue with pulmonary assessment.  But she has abnormal PFTs, will have CT surgery review most recent PFT (from 3/15)   She has pulm f/u next week

## 2023-03-29 NOTE — DISCHARGE SUMMARY
[facility] - Cardiology Stepdown  Heart Transplant  Discharge Summary      Patient Name: Hafsa Hawley  MRN: [id number]  Admission Date: 3/26/2023  Hospital Length of Stay: 3 days  Discharge Date and Time: 03/29/2023 4:06 PM  Attending Physician: [place] att. providers found   Discharging Provider: Steven Ramirez PA-C  Primary Care Provider: Cristobal Ann MD     HPI: Ms. Hawley is a 57 y.o. year old  female who was transported from OSH for management of ADHF. She has a pmx s/f NICM since 2013 s/p ICD, pulmonary hypertension, DM, HTN, permanent AF.  She presented to [facility] ED for symptoms of SOB, fatigue, and weakness x 3 days. She admits to difficulty breathing and more coughing if she lays on her right side. She reports her weight has been stable as far as she knows. She was recently transitioned from lasix 80 po bid to bumex 4 mg po bid about 11 days ago and reports compliance. She continues to urinate frequently. She also reports of daily n/v, twitching, restless legs, and chills that have been ongoing for months. She denies any recent changes to those symptoms. Denies any changes in dietary pattern or noncompliance.     At the OSH, she was found to be hemodynamically stable. Labs notable for bnp of 3800, troponin of 0.667 (similar to previous evaluation), creatinine of 2.4 (appears to be her baseline). CXR consistent with pulmonary congestion.  She received lasix 40 mg iv prior to transfer.     Regarding her advanced options, she was previously declined after committee meeting on 3/9/22 due to renal function, lung function and difficulty consistent medical follow up.  She saw Dr. Orozco on 3/14/23 for f/u visit as she was undergoing evaluation for heart-kidney transplant. There is unclear resolution about her chronic hypoxia and pulmonary status, but she is not yet listed from an HTS standpoint until further clarification. Following history taken from Dr. Orozco's recent clinic encounter.      She does not have her medications with her.  She is not have a medication list of her home medicines with her.  She does not know any of her medications by name except Lasix.  Was described as Lasix 80 mg twice daily but she reports she takes the morning dose but only use the evening dose if she feels the need to do so.  I asked her how she knows without a scale she said she just bases this on her abdominal bloating and any edema.  She reports that her scale is broken and she has not gotten a new 1 so she has no daily weights to report.  She is accompanied by her granddaughter.  She tells me if she was approved for transplant her primary caregiver will be Jeanie Jaimes (sister and Laura Jaimes her niece.  She is going to stay in Laura's house after her transplant, if approved.     She saw Dr. Guardado in January 2023 at which time sent to the ED and admitted to [facility] 1/3/23.  She was treated for ADHF with IV lasix 80 mg IV BID and HTS consulted.  At that time it was noted that she had run out of metoprolol for 1 week PTA.  She was readmitted to [facility] few weeks later with ADHF and at discharge told to see HTS.  She came Feb 3, 2023 to see Dr. Guardado but was sent to ED with nausea and vomiting.  There treated with IV lasix and sent home from ED.  She comes today for f/u visit.  Of note per Dr. Guardado she was seen by Pulm in Jan 2023 and they felt that there was no overt evidence of ILD or COPD on PFTs and CT chest.  They felt that the etiology of her chronic hypoxic and hypercapnic respiratory failure was not clear.     I last saw her February 16, 2023 at which time she reported that her shortness of breath was stable and described NYHA class 3 symptoms.     On 2/27/2023 PFT's read as reduced FEV1/SVC ratio (66%) and low FEF 25-75 suggest mild obstruction on spirometry. Lung volumes show moderate restriction is present. Overall spirometry shows severe ventilatory impairment. DLCO is moderately decreased. MVV  is severely decreased. On 2/27/2023 ABG 7.46/pCO2 was 39 and pO2 was 73 and these ABG's do not reflect the previously described chronic hypoxic and hypercapnic respiratory failure.     She saw Dr. Chacko February 27, 2023 note not yet signed but at end of current note he mentions his concern regarding PFT's and need to assure that she is optimized.  Her case was reviewed with Dr. Chu Feb 27, 2023 and plan made for today's visit with PFT's and lab.      Procedure(s) (LRB):  INSERTION, CATHETER, RIGHT HEART (Right)     Hospital Course: Ms Hawley was was admitted 3/26 for ADHF. She was diuresed on lasix gtt for a total net -jeromy 3.5L. She diuresed to a dry weight of 90.9 kg. RHC on 3/29 showed : RA 9, PCWP 16, PA 58/29, mean PA pressure 39 mmHg Gillian CO/CI: 4.1/2.03, so lasix gtt was discontinued. She is being discharged on 3/26 on a lighter dose of bumex 2 mg bid. (Was on bumex 4 bid prior.) Instructed her to resume bumex tomorrow. Unfortunately was denied again by selection committee on 3/29 (was being reconsidered for combined heart/kidney transplant) due to compliance concerns.       Goals of Care Treatment Preferences:  Code Status: Full Code    Health care agent: [facility] agent number: No value filed.          What is most important right now is to focus on curative/life-prolongation (regardless of treatment burdens), remaining as independent as possible, extending life as long as possible, even it it means sacrificing quality.  Accordingly, we have decided that the best plan to meet the patient's goals includes continuing with treatment.          Significant Diagnostic Studies: Labs: All labs within the past 24 hours have been reviewed    Pending Diagnostic Studies:     None        Final Active Diagnoses:    Diagnosis Date Noted POA    PRINCIPAL PROBLEM:  Acute on chronic combined systolic and diastolic heart failure [I50.42] 03/16/2018 Yes     Chronic    Chills [R68.83] 03/26/2023 Yes     Acute decompensated heart failure [I50.9] 11/18/2022 Yes    Type 2 diabetes mellitus with stage 4 chronic kidney disease, with long-term current use of insulin [E11.22, N18.4, Z79.4] 08/13/2020 Not Applicable    NICM (nonischemic cardiomyopathy) [I42.8] 10/27/2016 Yes     Chronic    Paroxysmal atrial fibrillation [I48.0] 08/25/2016 Yes     Chronic    Essential hypertension [I10] 07/22/2016 Yes     Chronic    Microcytic anemia [D50.9] 07/20/2016 Yes     Chronic      Problems Resolved During this Admission:      Discharged Condition: stable    Disposition: Home or Self Care    Follow Up:    Patient Instructions:   No discharge procedures on file.  Medications:  Reconciled Home Medications:      Medication List      CHANGE how you take these medications    bumetanide 2 MG tablet  Commonly known as: BUMEX  Take 1 tablet (2 mg total) by mouth 2 (two) times a day.  What changed: how much to take     ergocalciferol 50,000 unit Cap  Commonly known as: ERGOCALCIFEROL  Take 1 capsule (50,000 Units total) by mouth every 7 days.  What changed: when to take this        CONTINUE taking these medications    albuterol-ipratropium 2.5 mg-0.5 mg/3 mL nebulizer solution  Commonly known as: DUO-NEB  Take 3 mLs by nebulization 2 (two) times a day.     allopurinoL 100 MG tablet  Commonly known as: ZYLOPRIM  Take 1 tablet (100 mg total) by mouth daily as needed (gout attack).     amiodarone 200 MG Tab  Commonly known as: PACERONE  Take 200 mg by mouth.     apixaban 5 mg Tab  Commonly known as: ELIQUIS  Take 1 tablet (5 mg total) by mouth 2 (two) times daily.     atorvastatin 40 MG tablet  Commonly known as: LIPITOR  Take 1 tablet (40 mg total) by mouth every evening.     b complex vitamins tablet  Take 1 tablet by mouth once daily.     blood sugar diagnostic Strp  Commonly known as: ACCU-CHEK GUIDE TEST STRIPS  1 strip by Other route 3 (three) times daily.     cetirizine 10 MG tablet  Commonly known as: ZYRTEC  Take 10 mg by  mouth daily as needed for Allergies.     chlorthalidone 25 MG Tab  Commonly known as: HYGROTEN  Take 1 tablet (25 mg total) by mouth daily as needed.     diclofenac sodium 1 % Gel  Commonly known as: VOLTAREN  APPLY 2 G TOPICALLY 3 (THREE) TIMES DAILY AS NEEDED (PAIN).     DULERA 200-5 mcg/actuation inhaler  Generic drug: mometasone-formoterol  Inhale 2 puffs into the lungs 2 (two) times daily. Controller     ENTRESTO 49-51 mg per tablet  Generic drug: sacubitriL-valsartan  TAKE 1 TABLET BY MOUTH TWICE A DAY     ferrous sulfate 325 (65 FE) MG EC tablet  Take 1 tablet (325 mg total) by mouth once daily.     fluticasone propionate 50 mcg/actuation nasal spray  Commonly known as: FLONASE  2 sprays by Each Nostril route daily as needed for Rhinitis.     isosorbide dinitrate 30 MG Tab  Commonly known as: ISORDIL  Take 1 tablet (30 mg total) by mouth 3 (three) times daily.     JARDIANCE 10 mg tablet  Generic drug: empagliflozin  TAKE 1 TABLET BY MOUTH EVERY DAY     lancets Misc  Commonly known as: ACCU-CHEK SOFTCLIX LANCETS  1 Device by Misc.(Non-Drug; Combo Route) route 3 (three) times daily.     LIDOcaine 5 %  Commonly known as: LIDODERM  Place 1 patch onto the skin daily as needed (pain). Remove & Discard patch within 12 hours or as directed by MD     metoprolol succinate 50 MG 24 hr tablet  Commonly known as: TOPROL-XL  Take 1 tablet (50 mg total) by mouth once daily.     mirtazapine 7.5 MG Tab  Commonly known as: REMERON  Take 1 tablet (7.5 mg total) by mouth every evening.     ondansetron 8 MG Tbdl  Commonly known as: ZOFRAN-ODT  Take 1 tablet (8 mg total) by mouth every 12 (twelve) hours as needed (nausea).     pantoprazole 40 MG tablet  Commonly known as: PROTONIX  TAKE 1 TABLET BY MOUTH DAILY IN THE MORNING     potassium chloride 10 MEQ Cpsr  Commonly known as: MICRO-K  Take 1 capsule (10 mEq total) by mouth once daily.     pregabalin 25 MG capsule  Commonly known as: LYRICA  Take 1 capsule (25 mg total) by mouth  2 (two) times daily.     scopolamine 1.3-1.5 mg (1 mg over 3 days)  Commonly known as: TRANSDERM-SCOP  Place 1 patch onto the skin every 72 hours as needed (nausea).     semaglutide 0.25 mg or 0.5 mg(2 mg/1.5 mL) pen injector  Commonly known as: OZEMPIC  Take 0.25 mg for 4 weeks, then increase to 0.5 mg weekly     SPIRIVA RESPIMAT 1.25 mcg/actuation inhaler  Generic drug: tiotropium bromide  Inhale 2 puffs into the lungs once daily. Controller     traZODone 50 MG tablet  Commonly known as: DESYREL  Take 25 mg by mouth nightly as needed.     VENTOLIN HFA 90 mcg/actuation inhaler  Generic drug: albuterol  Inhale 2 puffs into the lungs every 6 (six) hours as needed for Shortness of Breath or Wheezing.        STOP taking these medications    ALPRAZolam 2 MG Tab  Commonly known as: XANAX     HYDROcodone-acetaminophen 5-325 mg per tablet  Commonly known as: NORCO     NITROSTAT 0.4 MG SL tablet  Generic drug: nitroGLYCERIN     promethazine-codeine 6.25-10 mg/5 ml 6.25-10 mg/5 mL syrup  Commonly known as: PHENERGAN with CODEINE            Steven Ramirez PA-C  Heart Transplant  [facility] - Cardiology Stepdown

## 2023-03-29 NOTE — HOSPITAL COURSE
Ms Hawley was was admitted 3/26 for ADHF. She was diuresed on lasix gtt for a total net -jeromy 3.5L. She diuresed to a dry weight of 90.9 kg. RHC on 3/29 showed : RA 9, PCWP 16, PA 58/29, mean PA pressure 39 mmHg Gillian CO/CI: 4.1/2.03, so lasix gtt was discontinued. She is being discharged on 3/26 on a lighter dose of bumex 2 mg bid. (Was on bumex 4 bid prior.) Instructed her to resume bumex tomorrow. Unfortunately was denied again by selection committee on 3/29 (was being reconsidered for combined heart/kidney transplant) due to compliance concerns.

## 2023-03-29 NOTE — COMMITTEE REVIEW
Native Organ Dx:   PILAR      Presented Ms. Hafsa Hawley at Heart Transplant/ LV[facility] Selection Committee today for consideration of Heart Transplant listing.  Ms Gray was declined for Heart Transplant listing due to poor medical adherence, declined pulmonary function, and declined renal function.      Note was written by Kezia Clifton RN    ==========================================================    I agree and attest to the decision of the committee.

## 2023-03-29 NOTE — SUBJECTIVE & OBJECTIVE
"Interval History: NAEON. Had some nausea overnight resolved with zofran. Patient on lasix gtt 20 mg/hr and diuresing well. Will get [facility] today to guide next steps.     Continuous Infusions:   furosemide (LASIX) 2 mg/mL continuous infusion (non-titrating) Stopped (03/29/23 1037)     Scheduled Meds:   amiodarone  200 mg Oral Daily    atorvastatin  40 mg Oral QHS    ferrous sulfate  1 tablet Oral Daily    fluticasone furoate-vilanteroL  1 puff Inhalation Daily    isosorbide dinitrate  30 mg Oral TID    metoprolol succinate  50 mg Oral Daily    mirtazapine  7.5 mg Oral QHS    pantoprazole  40 mg Oral Daily    pregabalin  25 mg Oral BID    sacubitriL-valsartan  1 tablet Oral BID    tiotropium bromide  2.5 mcg Inhalation Daily     PRN Meds:acetaminophen, albuterol sulfate **AND** ipratropium, allopurinoL, ALPRAZolam, cetirizine, dextrose 10%, dextrose 10%, dextrose, dextrose, fluticasone propionate, glucagon (human recombinant), HYDROcodone-acetaminophen, insulin aspart U-100, ondansetron, promethazine, promethazine-codeine 6.25-10 mg/5 ml, sodium chloride 0.9%, traZODone    Review of patient's allergies indicates:   Allergen Reactions    Penicillins Hives and Other (See Comments)    Iodinated contrast media Nausea And Vomiting    Oxycodone-acetaminophen Other (See Comments)     Nausea, Dizziness, Anxiety.  "I don't like how it makes me feel."   Given Hydromorphone 0.5mg IVP  Without problems.  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   Other drugs that have been demonstrated to be impacted by homozygosity for the UGT1A1 TA7 promoter " variant include pazopanib, nilotinib, atazanavir, and belinostat. Metabolism of other drugs not listed here may also be impacted by UGT1A1 enzyme activity.       Tramadol Nausea And Vomiting and Other (See Comments)     Other reaction(s): Other (See Comments)    Valsartan Other (See Comments)     Objective:     Vital Signs (Most Recent):  Temp: 97.8 °F (36.6 °C) (03/29/23 1111)  Pulse: 76 (03/29/23 1149)  Resp: 17 (03/29/23 1111)  BP: 103/65 (03/29/23 1111)  SpO2: (!) 92 % (03/29/23 1111) Vital Signs (24h Range):  Temp:  [96.5 °F (35.8 °C)-97.8 °F (36.6 °C)] 97.8 °F (36.6 °C)  Pulse:  [65-87] 76  Resp:  [16-18] 17  SpO2:  [92 %-100 %] 92 %  BP: ()/(54-74) 103/65     Patient Vitals for the past 72 hrs (Last 3 readings):   Weight   03/28/23 0355 90.9 kg (200 lb 6.4 oz)   03/27/23 0800 85 kg (187 lb 6.3 oz)   03/26/23 2130 86.2 kg (190 lb 0.6 oz)       Body mass index is 32.35 kg/m².      Intake/Output Summary (Last 24 hours) at 3/29/2023 1151  Last data filed at 3/29/2023 0621  Gross per 24 hour   Intake 222 ml   Output 2900 ml   Net -2678 ml         Hemodynamic Parameters:       Telemetry: NSR with frequent PVC and occasional NSVT    Physical Exam  Constitutional:       Appearance: Normal appearance.   HENT:      Head: Normocephalic and atraumatic.   Neck:      Comments: JVP does not appear elevated.   Cardiovascular:      Rate and Rhythm: Normal rate and regular rhythm.      Pulses: Normal pulses.      Heart sounds: Normal heart sounds.   Pulmonary:      Effort: Pulmonary effort is normal. No respiratory distress.      Breath sounds: No wheezing or rales.   Abdominal:      General: There is no distension.      Palpations: Abdomen is soft.   Musculoskeletal:      Cervical back: Normal range of motion and neck supple.      Right lower leg: No edema.      Left lower leg: No edema.   Skin:     General: Skin is warm and dry.   Neurological:      General: No focal deficit present.      Mental Status: She is alert  and oriented to person, place, and time.   Psychiatric:         Mood and Affect: Mood normal.         Behavior: Behavior normal.       Significant Labs:  CBC:  Recent Labs   Lab 03/27/23  0650 03/28/23  0349 03/29/23  0400   WBC 4.01 4.18 4.27   RBC 6.09* 5.94* 6.10*   HGB 10.6* 10.9* 10.7*   HCT 37.7 36.8* 38.1    166 166   MCV 62* 62* 63*   MCH 17.4* 18.4* 17.5*   MCHC 28.1* 29.6* 28.1*       BNP:  Recent Labs   Lab 03/26/23  1326   BNP 3,780*       CMP:  Recent Labs   Lab 03/27/23  0650 03/28/23  0349 03/29/23  0400   GLU 86 100 96   CALCIUM 9.7 9.2 9.3   ALBUMIN 3.6 3.6 3.6   PROT 6.3 6.3 6.4    142 139   K 3.7 4.2 4.3   CO2 26 29 29    101 100   BUN 51* 56* 63*   CREATININE 2.1* 2.4* 2.6*   ALKPHOS 46* 50* 49*   ALT 16 15 16   AST 18 15 18   BILITOT 3.0* 2.0* 1.7*        Coagulation:   No results for input(s): PT, INR, APTT in the last 168 hours.  LDH:  No results for input(s): LDH in the last 72 hours.  Microbiology:  Microbiology Results (last 7 days)       ** No results found for the last 168 hours. **            I have reviewed all pertinent labs within the past 24 hours.    Estimated Creatinine Clearance: 27.1 mL/min (A) (based on SCr of 2.6 mg/dL (H)).    Diagnostic Results:  I have reviewed all pertinent imaging results/findings within the past 24 hours.

## 2023-03-29 NOTE — INTERVAL H&P NOTE
The patient has been examined and the H&P has been reviewed:    I concur with the findings and no changes have occurred since H&P was written.    Procedure risks, benefits and alternative options discussed and understood by patient/family.          Active Hospital Problems    Diagnosis  POA    Chills [R68.83]  Yes    Acute decompensated heart failure [I50.9]  Yes    Type 2 diabetes mellitus with stage 4 chronic kidney disease, with long-term current use of insulin [E11.22, N18.4, Z79.4]  Not Applicable    Acute on chronic combined systolic and diastolic heart failure [I50.42]  Yes     Chronic    NICM (nonischemic cardiomyopathy) [I42.8]  Yes     Chronic    Paroxysmal atrial fibrillation [I48.0]  Yes     Chronic    Essential hypertension [I10]  Yes     Chronic    Microcytic anemia [D50.9]  Yes     Chronic      Resolved Hospital Problems   No resolved problems to display.

## 2023-03-30 ENCOUNTER — PATIENT OUTREACH (OUTPATIENT)
Dept: ADMINISTRATIVE | Facility: CLINIC | Age: 58
End: 2023-03-30
Payer: MEDICAID

## 2023-03-30 NOTE — TELEPHONE ENCOUNTER
Care of patient is being transferred to CHF section from Preht section, per Dr. Cardenas.    Dx:NICM  Reason: does not meet criteria for transplant; renal insufficiency, medical nonadherence, pulmonary   Pt is not on home inotrope therapy.      Outstanding orders scheduled:  Pt will FU in clinic with lab on 4-17-23    Follow up scheduled with her PCP, Dr Ann and her Pulm, Dr Cyndee Mattson

## 2023-04-01 ENCOUNTER — NURSE TRIAGE (OUTPATIENT)
Dept: ADMINISTRATIVE | Facility: CLINIC | Age: 58
End: 2023-04-01
Payer: MEDICAID

## 2023-04-02 ENCOUNTER — HOSPITAL ENCOUNTER (EMERGENCY)
Facility: HOSPITAL | Age: 58
Discharge: HOME | End: 2023-04-02
Attending: SURGERY
Payer: MEDICAID

## 2023-04-02 VITALS
BODY MASS INDEX: 32.28 KG/M2 | HEART RATE: 95 BPM | TEMPERATURE: 98 F | WEIGHT: 200 LBS | SYSTOLIC BLOOD PRESSURE: 139 MMHG | RESPIRATION RATE: 18 BRPM | DIASTOLIC BLOOD PRESSURE: 84 MMHG | OXYGEN SATURATION: 97 %

## 2023-04-02 DIAGNOSIS — M79.671 BILATERAL FOOT PAIN: ICD-10-CM

## 2023-04-02 DIAGNOSIS — Z86.69 HISTORY OF NEUROPATHY: ICD-10-CM

## 2023-04-02 DIAGNOSIS — F45.42 PAIN DISORDER ASSOCIATED WITH PSYCHOLOGICAL AND PHYSICAL FACTORS: Primary | ICD-10-CM

## 2023-04-02 DIAGNOSIS — E79.0 ELEVATED URIC ACID IN BLOOD: ICD-10-CM

## 2023-04-02 DIAGNOSIS — M79.672 BILATERAL FOOT PAIN: ICD-10-CM

## 2023-04-02 LAB
ACANTHOCYTES BLD QL SMEAR: PRESENT
ALBUMIN SERPL BCP-MCNC: 3.7 G/DL (ref 3.5–5.2)
ALP SERPL-CCNC: 46 U/L (ref 55–135)
ALT SERPL W/O P-5'-P-CCNC: 16 U/L (ref 10–44)
ANION GAP SERPL CALC-SCNC: 13 MMOL/L (ref 8–16)
ANISOCYTOSIS BLD QL SMEAR: ABNORMAL
AST SERPL-CCNC: 19 U/L (ref 10–40)
BASOPHILS # BLD AUTO: 0.04 K/UL (ref 0–0.2)
BASOPHILS NFR BLD: 0.9 % (ref 0–1.9)
BILIRUB SERPL-MCNC: 1.4 MG/DL (ref 0.1–1)
BUN SERPL-MCNC: 66 MG/DL (ref 6–20)
CALCIUM SERPL-MCNC: 9.5 MG/DL (ref 8.7–10.5)
CHLORIDE SERPL-SCNC: 107 MMOL/L (ref 95–110)
CK SERPL-CCNC: 40 U/L (ref 20–180)
CO2 SERPL-SCNC: 23 MMOL/L (ref 23–29)
CREAT SERPL-MCNC: 2.5 MG/DL (ref 0.5–1.4)
DACRYOCYTES BLD QL SMEAR: ABNORMAL
DIFFERENTIAL METHOD: ABNORMAL
EOSINOPHIL # BLD AUTO: 0.1 K/UL (ref 0–0.5)
EOSINOPHIL NFR BLD: 3.1 % (ref 0–8)
ERYTHROCYTE [DISTWIDTH] IN BLOOD BY AUTOMATED COUNT: 29.5 % (ref 11.5–14.5)
EST. GFR  (NO RACE VARIABLE): 22 ML/MIN/1.73 M^2
GLUCOSE SERPL-MCNC: 95 MG/DL (ref 70–110)
HCT VFR BLD AUTO: 30.6 % (ref 37–48.5)
HGB BLD-MCNC: 9.4 G/DL (ref 12–16)
HYPOCHROMIA BLD QL SMEAR: ABNORMAL
IMM GRANULOCYTES # BLD AUTO: 0.03 K/UL (ref 0–0.04)
IMM GRANULOCYTES NFR BLD AUTO: 0.7 % (ref 0–0.5)
LYMPHOCYTES # BLD AUTO: 0.7 K/UL (ref 1–4.8)
LYMPHOCYTES NFR BLD: 16.1 % (ref 18–48)
MCH RBC QN AUTO: 17.6 PG (ref 27–31)
MCHC RBC AUTO-ENTMCNC: 30.7 G/DL (ref 32–36)
MCV RBC AUTO: 57 FL (ref 82–98)
MONOCYTES # BLD AUTO: 0.5 K/UL (ref 0.3–1)
MONOCYTES NFR BLD: 10.6 % (ref 4–15)
NEUTROPHILS # BLD AUTO: 3.1 K/UL (ref 1.8–7.7)
NEUTROPHILS NFR BLD: 68.6 % (ref 38–73)
NRBC BLD-RTO: 0 /100 WBC
OVALOCYTES BLD QL SMEAR: ABNORMAL
PLATELET # BLD AUTO: 165 K/UL (ref 150–450)
PLATELET BLD QL SMEAR: ABNORMAL
PMV BLD AUTO: ABNORMAL FL (ref 9.2–12.9)
POIKILOCYTOSIS BLD QL SMEAR: ABNORMAL
POLYCHROMASIA BLD QL SMEAR: ABNORMAL
POTASSIUM SERPL-SCNC: 4.9 MMOL/L (ref 3.5–5.1)
PROT SERPL-MCNC: 6.9 G/DL (ref 6–8.4)
RBC # BLD AUTO: 5.34 M/UL (ref 4–5.4)
SCHISTOCYTES BLD QL SMEAR: PRESENT
SODIUM SERPL-SCNC: 143 MMOL/L (ref 136–145)
SPHEROCYTES BLD QL SMEAR: ABNORMAL
TARGETS BLD QL SMEAR: ABNORMAL
URATE SERPL-MCNC: 11.7 MG/DL (ref 2.4–5.7)
WBC # BLD AUTO: 4.54 K/UL (ref 3.9–12.7)

## 2023-04-02 PROCEDURE — 96374 THER/PROPH/DIAG INJ IV PUSH: CPT | Mod: NTX

## 2023-04-02 PROCEDURE — 63600175 PHARM REV CODE 636 W HCPCS: Mod: NTX | Performed by: SURGERY

## 2023-04-02 PROCEDURE — 82550 ASSAY OF CK (CPK): CPT | Mod: NTX | Performed by: SURGERY

## 2023-04-02 PROCEDURE — 85025 COMPLETE CBC W/AUTO DIFF WBC: CPT | Mod: NTX | Performed by: SURGERY

## 2023-04-02 PROCEDURE — 84550 ASSAY OF BLOOD/URIC ACID: CPT | Mod: NTX | Performed by: SURGERY

## 2023-04-02 PROCEDURE — 96372 THER/PROPH/DIAG INJ SC/IM: CPT | Performed by: SURGERY

## 2023-04-02 PROCEDURE — 99284 EMERGENCY DEPT VISIT MOD MDM: CPT | Mod: 25,NTX

## 2023-04-02 PROCEDURE — 36415 COLL VENOUS BLD VENIPUNCTURE: CPT | Mod: NTX | Performed by: SURGERY

## 2023-04-02 PROCEDURE — 80053 COMPREHEN METABOLIC PANEL: CPT | Mod: NTX | Performed by: SURGERY

## 2023-04-02 RX ORDER — ONDANSETRON 2 MG/ML
4 INJECTION INTRAMUSCULAR; INTRAVENOUS
Status: COMPLETED | OUTPATIENT
Start: 2023-04-02 | End: 2023-04-02

## 2023-04-02 RX ORDER — MEPERIDINE HYDROCHLORIDE 50 MG/ML
50 INJECTION INTRAMUSCULAR; INTRAVENOUS; SUBCUTANEOUS
Status: COMPLETED | OUTPATIENT
Start: 2023-04-02 | End: 2023-04-02

## 2023-04-02 RX ADMIN — MEPERIDINE HYDROCHLORIDE 50 MG: 50 INJECTION INTRAMUSCULAR; INTRAVENOUS; SUBCUTANEOUS at 06:04

## 2023-04-02 RX ADMIN — ONDANSETRON HYDROCHLORIDE 4 MG: 2 SOLUTION INTRAMUSCULAR; INTRAVENOUS at 06:04

## 2023-04-02 NOTE — TELEPHONE ENCOUNTER
Patient states she has taken her allopurinol ut the toe is continuing to swell and her foot is very painful. Advised to ED as per protocol  Reason for Disposition   Patient sounds very sick or weak to the triager     Patient states she has taken her allopurinol ut the toe is continuing to swell and her foot is very painful. Advised to ED as per protocol    Additional Information   Negative: Followed a foot injury   Negative: Diabetes mellitus   Negative: Toe pain is main symptom   Negative: Ankle pain is main symptom   Negative: Thigh or calf pain is main symptom    Protocols used: Foot Pain-A-AH

## 2023-04-02 NOTE — ED TRIAGE NOTES
57 y.o. female presents to ER ED 03 /ED 03B   Chief Complaint   Patient presents with    Leg Pain   .   C/o pain to bilat feet and right leg

## 2023-04-03 ENCOUNTER — NURSE TRIAGE (OUTPATIENT)
Dept: ADMINISTRATIVE | Facility: CLINIC | Age: 58
End: 2023-04-03
Payer: MEDICAID

## 2023-04-03 ENCOUNTER — HOSPITAL ENCOUNTER (EMERGENCY)
Facility: HOSPITAL | Age: 58
Discharge: HOME | End: 2023-04-03
Attending: STUDENT IN AN ORGANIZED HEALTH CARE EDUCATION/TRAINING PROGRAM
Payer: MEDICAID

## 2023-04-03 ENCOUNTER — TELEPHONE (OUTPATIENT)
Dept: PALLIATIVE MEDICINE | Facility: CLINIC | Age: 58
End: 2023-04-03
Payer: MEDICAID

## 2023-04-03 ENCOUNTER — TELEPHONE (OUTPATIENT)
Dept: FAMILY MEDICINE | Facility: CLINIC | Age: 58
End: 2023-04-03
Payer: MEDICAID

## 2023-04-03 ENCOUNTER — PATIENT MESSAGE (OUTPATIENT)
Dept: ADMINISTRATIVE | Facility: HOSPITAL | Age: 58
End: 2023-04-03
Payer: MEDICAID

## 2023-04-03 VITALS
OXYGEN SATURATION: 95 % | HEIGHT: 66 IN | WEIGHT: 196 LBS | RESPIRATION RATE: 20 BRPM | TEMPERATURE: 99 F | HEART RATE: 95 BPM | BODY MASS INDEX: 31.5 KG/M2 | SYSTOLIC BLOOD PRESSURE: 126 MMHG | DIASTOLIC BLOOD PRESSURE: 92 MMHG

## 2023-04-03 DIAGNOSIS — M10.9 ACUTE GOUT OF RIGHT FOOT, UNSPECIFIED CAUSE: Primary | ICD-10-CM

## 2023-04-03 PROCEDURE — 96372 THER/PROPH/DIAG INJ SC/IM: CPT | Performed by: NURSE PRACTITIONER

## 2023-04-03 PROCEDURE — 63600175 PHARM REV CODE 636 W HCPCS: Mod: NTX | Performed by: NURSE PRACTITIONER

## 2023-04-03 PROCEDURE — 99284 EMERGENCY DEPT VISIT MOD MDM: CPT | Mod: NTX

## 2023-04-03 RX ORDER — MEPERIDINE HYDROCHLORIDE 25 MG/ML
25 INJECTION INTRAMUSCULAR; INTRAVENOUS; SUBCUTANEOUS
Status: COMPLETED | OUTPATIENT
Start: 2023-04-03 | End: 2023-04-03

## 2023-04-03 RX ADMIN — MEPERIDINE HYDROCHLORIDE 25 MG: 25 INJECTION INTRAMUSCULAR; INTRAVENOUS; SUBCUTANEOUS at 10:04

## 2023-04-03 NOTE — TELEPHONE ENCOUNTER
Spoke with pt who states she was seen in ED yesterday due to foot pain. States this morning she is having severe pain to right foot and leg and unable to bear weight. States she has taken hydrocodone as prescribed with no relief of pain. Advised ot be seen in ED now. Pt states she is currently at Dr. Waterman's office to see if she can get an appointment with provider. States she called office an hour ago but no call back received.     9:50 Nurse sent secure chat to Dr. Ann who states he is not in office today.    1004Spoke with Lourdes in Dr. Ann's office who states Dr. Ann is not in office on Monday's, and spoke with  of pt, and was able to make appointment for tomorrow    Attempted to call pt back x2 with no contact made. VM left. Pt noted gto be in ED at this time.      Reason for Disposition   Patient sounds very sick or weak to the triager    Additional Information   Negative: Entire foot is cool or blue in comparison to other foot   Negative: Purple or black skin on foot or toe   Negative: Red area or streak and fever   Negative: Swollen foot and fever    Protocols used: Foot Pain-A-[place]

## 2023-04-03 NOTE — DISCHARGE INSTRUCTIONS
Continue taking allopurinol Lyrica for gout   Please monitor your diet to avoid increase in uric acid levels  Please follow-up with Dr. Ann

## 2023-04-03 NOTE — TELEPHONE ENCOUNTER
Spoke to patient via phone. Patient historically crying from excruciating pain to bilateral lower extremities. Patient verbalizes she has taken lyrica, hydrocodone, gabapentin, and also received a toradol injection while  in the ER. Patient wanted to take something else for pain, I advised her not to d/t have taken all of the medications mentioned above. Patient Is schedule at 1:30 on 4/4/23. Patient advised to go to nearest ER or urgent care if anything worsens before appt.

## 2023-04-03 NOTE — TELEPHONE ENCOUNTER
Patient states she has taken her allopurinol but the toe is continuing to swell and her foot is very painful.    Please advise.

## 2023-04-03 NOTE — TELEPHONE ENCOUNTER
----- Message from Frank Cook sent at 4/3/2023  9:07 AM CDT -----  Contact: Patient  Hafsa Hawley  MRN: [id number]  : 1965  PCP: Cristobal Ann  Home Phone      [phone]  Work Phone      Not on file.  Mobile          [phone]  Home Phone      [phone]      MESSAGE: seen in ER yesterday (foot) -- today unable to walk -- requesting appt today (any Dr)    Call [phone]    PCP: Seth

## 2023-04-03 NOTE — ED PROVIDER NOTES
"Encounter Date: 4/3/2023       History     Chief Complaint   Patient presents with    Foot Pain     Chief complaint:  Foot pain   [age] year female with extensive comorbidities including arthritis AFib CKD CHF with AICD hypertension thyroid disease diabetes impaired renal function currently on transplant list presents to be evaluated for pain in her right foot.  Patient reports the pain is 10/10 deep aching pain exacerbated with touch of motion and has no alleviating factors.  Patient was seen in our ED 1 day prior was told she had gout.  Patient is noted to have a uric acid level of 11.7.  Patient denies any changes since her previous visit.  She she reports she is currently taking allopurinol and Lyrica.  Patient is known to be poor historian very noncompliant with medication regimens.  Patient data some mild erythema and swelling to the dorsal aspect of the right foot    Review of patient's allergies indicates:   Allergen Reactions    Penicillins Hives and Other (See Comments)    Iodinated contrast media Nausea And Vomiting    Oxycodone-acetaminophen Other (See Comments)     Nausea, Dizziness, Anxiety.  "I don't like how it makes me feel."   Given Hydromorphone 0.5mg IVP  Without problems.  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 Metabolism of other drugs not listed here may also be impacted by UGT1A1 enzyme activity.       Tramadol Nausea And Vomiting and " Other (See Comments)     Other reaction(s): Other (See Comments)    Valsartan Other (See Comments)     Past Medical History:   Diagnosis Date    Anemia     Arthritis     Atrial fibrillation     OAC    Chronic respiratory failure with hypoxia, on home oxygen therapy     2L with activity, off at rest.  Per Pulm  no overt evidence of ILD or COPD on PFTs and CT to explain O2 needs.    CKD (chronic kidney disease), stage IV 05/08/2018    Congestive heart failure     s/p AICD placement,    Deep vein thrombosis     Depression     elevated bilirubin d/t Gilbert's syndrome     confirmed by [facility] genetic testing, evaluated by hepatology    Encounter for blood transfusion     GERD (gastroesophageal reflux disease)     Hypertension     Pheochromocytoma, malignant     Right kidney mass     Sleep apnea     Thalassemia trait, alpha     Thyroid disease     Type 2 diabetes mellitus with hyperglycemia, without long-term current use of insulin 08/13/2020     Past Surgical History:   Procedure Laterality Date    APPENDECTOMY      BONE MARROW BIOPSY      CARDIAC DEFIBRILLATOR PLACEMENT Left 12/2016    CARDIAC ELECTROPHYSIOLOGY MAPPING AND ABLATION      CARDIAC ELECTROPHYSIOLOGY MAPPING AND ABLATION      COLONOSCOPY N/A 5/6/2022    Procedure: COLONOSCOPY;  Surgeon: Arely Betancourt MD;  Location: [facility] ([address] [place] [address]);  Service: Endoscopy;  Laterality: N/A;  heart transplant candidate/ EF 25% - 2nd floor/ defib - Biotronik - ERW  Eliquis - per Dr. Cortez with [facility] [place], Pt ok to hold Eliquis x 2 days prior-see media tab-outside correspondence dated 12/30/21  - ERW  verbal instructions/portal instructions/email instructions - s    EYE SURGERY      due to running tears    FRACTURE SURGERY Left     hand 5th digit    HYSTERECTOMY      KNEE SURGERY Left 2016    hematoma    LIVER BIOPSY  10/24/2018    Minimal steatosis, predominantly macrovesicular, 1%, Minimal nonspecific portal inflammation, no fibrosis. No findings on biopsy to explain  elevated bilirubin levels. Could be d/t Gilbert's =?- hemolysis    RIGHT HEART CATHETERIZATION Right 2021    Procedure: INSERTION, CATHETER, RIGHT HEART;  Surgeon: Irving Cardenas MD;  Location: [facility] CATH LAB;  Service: Cardiology;  Laterality: Right;    RIGHT HEART CATHETERIZATION Right 2022    Procedure: INSERTION, CATHETER, RIGHT HEART;  Surgeon: Burke Camilo MD;  Location: [facility] CATH LAB;  Service: Cardiology;  Laterality: Right;    RIGHT HEART CATHETERIZATION Right 3/29/2023    Procedure: INSERTION, CATHETER, RIGHT HEART;  Surgeon: Katie Liriano DO;  Location: [facility] CATH LAB;  Service: Cardiology;  Laterality: Right;    TRANSJUGULAR BIOPSY OF LIVER N/A 10/24/2018    Procedure: BIOPSY, LIVER, TRANSJUGULAR APPROACH;  Surgeon: Carmen Diagnostic Provider;  Location: [facility] OR [address] [place] [address];  Service: Radiology;  Laterality: N/A;     Family History   Problem Relation Age of Onset    Cancer Mother         pancreatic CA early 50's    Heart disease Father          MI in late 50's    Hypertension Father     Heart attack Father     Heart disease Sister     Heart disease Brother     Cirrhosis Brother         alcoholic    Heart disease Sister     Heart disease Brother     Hypertension Brother     Diabetes Brother      Social History     Tobacco Use    Smoking status: Never    Smokeless tobacco: Never   Substance Use Topics    Alcohol use: Yes     Comment: up to 1 yr ago drank 2-3 drinks on occasion but sporadic    Drug use: No     Review of Systems   Musculoskeletal:  Positive for myalgias.   Skin:  Positive for color change.     Physical Exam     Initial Vitals [23 0957]   BP Pulse Resp Temp SpO2   125/83 95 (!) 22 98.2 °F (36.8 °C) 95 %      MAP       --         Physical Exam    Nursing note and vitals reviewed.  Constitutional: She appears well-developed and well-nourished.   HENT:   Head: Normocephalic and atraumatic.   Mouth/Throat: Oropharynx is clear and moist.   Neck: Neck supple.   Normal  range of motion.  Cardiovascular:  Normal rate.     Exam reveals no gallop and no friction rub.       No murmur heard.  Pulmonary/Chest: Breath sounds normal. She has no wheezes. She has no rhonchi. She has no rales.   Musculoskeletal:      Cervical back: Normal range of motion and neck supple.     Neurological: She is alert and oriented to person, place, and time.   Skin: Skin is warm and dry. Capillary refill takes less than 2 seconds. There is erythema.   Mild erythema to the lateral aspect of the dorsal right foot   Psychiatric: She has a normal mood and affect. Thought content normal.       ED Course   Procedures  Labs Reviewed - No data to display       Imaging Results    None          Medications   meperidine (PF) injection 25 mg (25 mg Intramuscular Given 4/3/23 1027)     Medical Decision Making:   Differential Diagnosis:   gout, chronic pain, cellulitis, neuropathy  Clinical Tests:   Lab Tests: Reviewed  ED Management:  Patient area of erythema to the dorsal aspect of the right foot consistent with recent diagnosis of gout   Patient's recent labs reviewed with elevated uric acid she was counseled or on diet modification and continue taking her previously prescribed allopurinol and Lyrica   She requested Demerol shot today in ED   Stable for DC within close follow-up with PCP                        Clinical Impression:   Final diagnoses:  [M10.9] Acute gout of right foot, unspecified cause (Primary)        ED Disposition Condition    Discharge Stable          ED Prescriptions    None       Follow-up Information       Follow up With Specialties Details Why Contact Info    Cristobal Ann MD Family Medicine Schedule an appointment as soon as possible for a visit in 2 days  [address] [place], [place] [id number]  [phone]               Salud Pagan NP  04/03/23 [id number]

## 2023-04-03 NOTE — ED PROVIDER NOTES
"Encounter Date: 4/2/2023       History     Chief Complaint   Patient presents with    Leg Pain     Hafsa Hawley is a 57 y.o. female that presents with bilateral foot pain  Patient has longstanding issues with several medical comorbidities per HX  Patient has gout, patient has neuropathy, patient has chronic pain every day  Patient does not follow with gout diet, patient traditionally very noncompliant  Pt states her bilateral feet are hurting similar to previous gout related pain  Good distal pulses, good capillary refill, denies any foot trauma today  Pt denies any calf tenderness, (-) Homans sign, only bilateral foot pain    Review of patient's allergies indicates:   Allergen Reactions    Penicillins Hives and Other (See Comments)    Iodinated contrast media Nausea And Vomiting    Oxycodone-acetaminophen Other (See Comments)     Nausea, Dizziness, Anxiety.  "I don't like how it makes me feel."   Given Hydromorphone 0.5mg IVP  Without problems.  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 Metabolism of other drugs not listed here may also be impacted by UGT1A1 enzyme activity.       Tramadol Nausea And Vomiting and Other (See Comments)     Other reaction(s): Other (See Comments)    Valsartan Other (See Comments)     Past Medical History:   Diagnosis Date    Anemia     Arthritis     Atrial fibrillation     OAC    Chronic respiratory failure with hypoxia, on home " oxygen therapy     2L with activity, off at rest.  Per Pulm  no overt evidence of ILD or COPD on PFTs and CT to explain O2 needs.    CKD (chronic kidney disease), stage IV 05/08/2018    Congestive heart failure     s/p AICD placement,    Deep vein thrombosis     Depression     elevated bilirubin d/t Gilbert's syndrome     confirmed by [facility] genetic testing, evaluated by hepatology    Encounter for blood transfusion     GERD (gastroesophageal reflux disease)     Hypertension     Pheochromocytoma, malignant     Right kidney mass     Sleep apnea     Thalassemia trait, alpha     Thyroid disease     Type 2 diabetes mellitus with hyperglycemia, without long-term current use of insulin 08/13/2020     Past Surgical History:   Procedure Laterality Date    APPENDECTOMY      BONE MARROW BIOPSY      CARDIAC DEFIBRILLATOR PLACEMENT Left 12/2016    CARDIAC ELECTROPHYSIOLOGY MAPPING AND ABLATION      CARDIAC ELECTROPHYSIOLOGY MAPPING AND ABLATION      COLONOSCOPY N/A 5/6/2022    Procedure: COLONOSCOPY;  Surgeon: Arely Betancourt MD;  Location: [facility] ENDO ([address] [place] [address]);  Service: Endoscopy;  Laterality: N/A;  heart transplant candidate/ EF 25% - 2nd floor/ defib - Biotronik - ERW  Eliquis - per Dr. Cortez with [facility], Pt ok to hold Eliquis x 2 days prior-see media tab-outside correspondence dated 12/30/21  - ERW  verbal instructions/portal instructions/email instructions - s    EYE SURGERY      due to running tears    FRACTURE SURGERY Left     hand 5th digit    HYSTERECTOMY      KNEE SURGERY Left 2016    hematoma    LIVER BIOPSY  10/24/2018    Minimal steatosis, predominantly macrovesicular, 1%, Minimal nonspecific portal inflammation, no fibrosis. No findings on biopsy to explain elevated bilirubin levels. Could be d/t Gilbert's =?- hemolysis    RIGHT HEART CATHETERIZATION Right 12/07/2021    Procedure: INSERTION, CATHETER, RIGHT HEART;  Surgeon: Irving Cardenas MD;  Location: [facility] CATH LAB;  Service: Cardiology;  Laterality: Right;     RIGHT HEART CATHETERIZATION Right 2022    Procedure: INSERTION, CATHETER, RIGHT HEART;  Surgeon: Burke Camilo MD;  Location: [facility] CATH LAB;  Service: Cardiology;  Laterality: Right;    RIGHT HEART CATHETERIZATION Right 3/29/2023    Procedure: INSERTION, CATHETER, RIGHT HEART;  Surgeon: Katie Liriano DO;  Location: [facility] CATH LAB;  Service: Cardiology;  Laterality: Right;    TRANSJUGULAR BIOPSY OF LIVER N/A 10/24/2018    Procedure: BIOPSY, LIVER, TRANSJUGULAR APPROACH;  Surgeon: Carmen Diagnostic Provider;  [facility];  Service: Radiology;  Laterality: N/A;     Family History   Problem Relation Age of Onset    Cancer Mother         pancreatic CA early 50's    Heart disease Father          MI in late 50's    Hypertension Father     Heart attack Father     Heart disease Sister     Heart disease Brother     Cirrhosis Brother         alcoholic    Heart disease Sister     Heart disease Brother     Hypertension Brother     Diabetes Brother      Social History     Tobacco Use    Smoking status: Never    Smokeless tobacco: Never   Substance Use Topics    Alcohol use: Yes     Comment: up to 1 yr ago drank 2-3 drinks on occasion but sporadic    Drug use: No     Review of Systems   Constitutional: Negative.    HENT: Negative.     Eyes: Negative.    Respiratory: Negative.     Cardiovascular: Negative.    Gastrointestinal: Negative.    Genitourinary: Negative.    Musculoskeletal:         (+) bilateral foot pain   Skin: Negative.    Neurological: Negative.    Psychiatric/Behavioral: Negative.       Physical Exam     Initial Vitals [23 1814]   BP Pulse Resp Temp SpO2   139/84 93 18 96.9 °F (36.1 °C) 97 %      MAP       --         Physical Exam    Nursing note and vitals reviewed.  Constitutional: Vital signs are normal. She appears well-developed and well-nourished. She is cooperative.   HENT:   Head: Normocephalic and atraumatic.   Right Ear: External ear normal.   Left Ear: External  ear normal.   Nose: Nose normal.   Mouth/Throat: Oropharynx is clear and moist.   Eyes: Conjunctivae, EOM and lids are normal. Pupils are equal, round, and reactive to light.   Neck: Trachea normal and phonation normal. Neck supple. No JVD present.   Normal range of motion.   Full passive range of motion without pain.     Cardiovascular:  Normal rate, regular rhythm, S1 normal, S2 normal, normal heart sounds, intact distal pulses and normal pulses.           Pulmonary/Chest: Effort normal and breath sounds normal.   Abdominal: Abdomen is soft and flat. Bowel sounds are normal.   Musculoskeletal:         General: Normal range of motion.      Cervical back: Full passive range of motion without pain, normal range of motion and neck supple.     Neurological: She is alert and oriented to person, place, and time. She has normal strength.   Skin: Skin is warm, dry and intact. Capillary refill takes less than 2 seconds.       ED Course   Procedures  Labs Reviewed   CBC W/ AUTO DIFFERENTIAL - Abnormal; Notable for the following components:       Result Value    Hemoglobin 9.4 (*)     Hematocrit 30.6 (*)     MCV 57 (*)     MCH 17.6 (*)     MCHC 30.7 (*)     RDW 29.5 (*)     Immature Granulocytes 0.7 (*)     Lymph # 0.7 (*)     Lymph % 16.1 (*)     All other components within normal limits   COMPREHENSIVE METABOLIC PANEL - Abnormal; Notable for the following components:    BUN 66 (*)     Creatinine 2.5 (*)     Total Bilirubin 1.4 (*)     Alkaline Phosphatase 46 (*)     eGFR 22 (*)     All other components within normal limits   URIC ACID - Abnormal; Notable for the following components:    Uric Acid 11.7 (*)     All other components within normal limits   CK          Imaging Results    None          Medications   meperidine injection 50 mg (50 mg Intramuscular Given 23)   ondansetron injection 4 mg (4 mg Intravenous Given 23)     Medical Decision Makin-year-old female presents with chronic bilateral  foot pain, worse this p.m.  Patient suffers from neuropathy & chronic gout with severe noncompliance  Patient states due to her renal disease, she can not take gout medications  She also does not follow a gout diet, elevated uric acid today in the ER today  Feels better after IM Demerol in the ER, allergic to oxycodone/Tylenol per HX  Gout diet, follow-up with PCP, continue home pain medication on discharge  Patient counseled to return to the ER with any concerning symptoms on DC                        Clinical Impression:   Final diagnoses:  [E79.0] Elevated uric acid in blood  [M79.671, M79.672] Bilateral foot pain  [F45.42] Pain disorder associated with psychological and physical factors (Primary)  [Z86.69] History of neuropathy        ED Disposition Condition    Discharge Stable          ED Prescriptions    None       Follow-up Information       Follow up With Specialties Details Why Contact Info    Cristobal Ann MD Family Medicine Go to   [address] [place] [address]  [phone]               Adriano Low MD  04/02/23 2023

## 2023-04-04 ENCOUNTER — HOSPITAL ENCOUNTER (OUTPATIENT)
Dept: RADIOLOGY | Facility: HOSPITAL | Age: 58
Discharge: HOME | End: 2023-04-04
Attending: FAMILY MEDICINE
Payer: MEDICAID

## 2023-04-04 ENCOUNTER — OFFICE VISIT (OUTPATIENT)
Dept: FAMILY MEDICINE | Facility: CLINIC | Age: 58
End: 2023-04-04
Payer: MEDICAID

## 2023-04-04 ENCOUNTER — OFFICE VISIT (OUTPATIENT)
Dept: PALLIATIVE MEDICINE | Facility: CLINIC | Age: 58
End: 2023-04-04
Payer: MEDICAID

## 2023-04-04 VITALS
WEIGHT: 196 LBS | RESPIRATION RATE: 20 BRPM | DIASTOLIC BLOOD PRESSURE: 92 MMHG | SYSTOLIC BLOOD PRESSURE: 124 MMHG | BODY MASS INDEX: 31.5 KG/M2 | HEART RATE: 96 BPM | HEIGHT: 66 IN

## 2023-04-04 DIAGNOSIS — R60.0 EDEMA OF RIGHT LOWER EXTREMITY: Primary | ICD-10-CM

## 2023-04-04 DIAGNOSIS — Z51.5 PALLIATIVE CARE ENCOUNTER: ICD-10-CM

## 2023-04-04 DIAGNOSIS — M10.471 OTHER SECONDARY ACUTE GOUT OF RIGHT ANKLE: Primary | ICD-10-CM

## 2023-04-04 DIAGNOSIS — M10.9 GOUT, UNSPECIFIED CAUSE, UNSPECIFIED CHRONICITY, UNSPECIFIED SITE: ICD-10-CM

## 2023-04-04 DIAGNOSIS — I50.9 CONGESTIVE HEART FAILURE, UNSPECIFIED HF CHRONICITY, UNSPECIFIED HEART FAILURE TYPE: ICD-10-CM

## 2023-04-04 DIAGNOSIS — R11.0 NAUSEA: ICD-10-CM

## 2023-04-04 DIAGNOSIS — R60.0 EDEMA OF RIGHT LOWER EXTREMITY: ICD-10-CM

## 2023-04-04 PROCEDURE — 99214 OFFICE O/P EST MOD 30 MIN: CPT | Mod: S$PBB,,, | Performed by: FAMILY MEDICINE

## 2023-04-04 PROCEDURE — 4010F ACE/ARB THERAPY RXD/TAKEN: CPT | Mod: CPTII,,, | Performed by: FAMILY MEDICINE

## 2023-04-04 PROCEDURE — 99214 PR OFFICE/OUTPT VISIT, EST, LEVL IV, 30-39 MIN: ICD-10-PCS | Mod: S$PBB,,, | Performed by: FAMILY MEDICINE

## 2023-04-04 PROCEDURE — 99999 PR PBB SHADOW E&M-EST. PATIENT-LVL IV: CPT | Mod: PBBFAC,,, | Performed by: FAMILY MEDICINE

## 2023-04-04 PROCEDURE — 3008F PR BODY MASS INDEX (BMI) DOCUMENTED: ICD-10-PCS | Mod: CPTII,,, | Performed by: FAMILY MEDICINE

## 2023-04-04 PROCEDURE — 3044F PR MOST RECENT HEMOGLOBIN A1C LEVEL <7.0%: ICD-10-PCS | Mod: CPTII,95,NTX, | Performed by: STUDENT IN AN ORGANIZED HEALTH CARE EDUCATION/TRAINING PROGRAM

## 2023-04-04 PROCEDURE — 99999 PR PBB SHADOW E&M-EST. PATIENT-LVL IV: ICD-10-PCS | Mod: PBBFAC,,, | Performed by: FAMILY MEDICINE

## 2023-04-04 PROCEDURE — 93971 EXTREMITY STUDY: CPT | Mod: 26,RT,NTX, | Performed by: RADIOLOGY

## 2023-04-04 PROCEDURE — 93971 US LOWER EXTREMITY VEINS RIGHT: ICD-10-PCS | Mod: 26,RT,NTX, | Performed by: RADIOLOGY

## 2023-04-04 PROCEDURE — 3044F PR MOST RECENT HEMOGLOBIN A1C LEVEL <7.0%: ICD-10-PCS | Mod: CPTII,,, | Performed by: FAMILY MEDICINE

## 2023-04-04 PROCEDURE — 4010F PR ACE/ARB THEARPY RXD/TAKEN: ICD-10-PCS | Mod: CPTII,95,NTX, | Performed by: STUDENT IN AN ORGANIZED HEALTH CARE EDUCATION/TRAINING PROGRAM

## 2023-04-04 PROCEDURE — 93971 EXTREMITY STUDY: CPT | Mod: TC,RT,NTX

## 2023-04-04 PROCEDURE — 3044F HG A1C LEVEL LT 7.0%: CPT | Mod: CPTII,95,NTX, | Performed by: STUDENT IN AN ORGANIZED HEALTH CARE EDUCATION/TRAINING PROGRAM

## 2023-04-04 PROCEDURE — 96372 THER/PROPH/DIAG INJ SC/IM: CPT | Mod: PBBFAC

## 2023-04-04 PROCEDURE — 1111F DSCHRG MED/CURRENT MED MERGE: CPT | Mod: CPTII,95,NTX, | Performed by: STUDENT IN AN ORGANIZED HEALTH CARE EDUCATION/TRAINING PROGRAM

## 2023-04-04 PROCEDURE — 3008F BODY MASS INDEX DOCD: CPT | Mod: CPTII,,, | Performed by: FAMILY MEDICINE

## 2023-04-04 PROCEDURE — 1111F PR DISCHARGE MEDS RECONCILED W/ CURRENT OUTPATIENT MED LIST: ICD-10-PCS | Mod: CPTII,95,NTX, | Performed by: STUDENT IN AN ORGANIZED HEALTH CARE EDUCATION/TRAINING PROGRAM

## 2023-04-04 PROCEDURE — 99214 OFFICE O/P EST MOD 30 MIN: CPT | Mod: PBBFAC,25 | Performed by: FAMILY MEDICINE

## 2023-04-04 PROCEDURE — 99215 PR OFFICE/OUTPT VISIT, EST, LEVL V, 40-54 MIN: ICD-10-PCS | Mod: 95,NTX,, | Performed by: STUDENT IN AN ORGANIZED HEALTH CARE EDUCATION/TRAINING PROGRAM

## 2023-04-04 PROCEDURE — 4010F ACE/ARB THERAPY RXD/TAKEN: CPT | Mod: CPTII,95,NTX, | Performed by: STUDENT IN AN ORGANIZED HEALTH CARE EDUCATION/TRAINING PROGRAM

## 2023-04-04 PROCEDURE — 3074F SYST BP LT 130 MM HG: CPT | Mod: CPTII,,, | Performed by: FAMILY MEDICINE

## 2023-04-04 PROCEDURE — 3044F HG A1C LEVEL LT 7.0%: CPT | Mod: CPTII,,, | Performed by: FAMILY MEDICINE

## 2023-04-04 PROCEDURE — 1111F PR DISCHARGE MEDS RECONCILED W/ CURRENT OUTPATIENT MED LIST: ICD-10-PCS | Mod: CPTII,,, | Performed by: FAMILY MEDICINE

## 2023-04-04 PROCEDURE — 3080F DIAST BP >= 90 MM HG: CPT | Mod: CPTII,,, | Performed by: FAMILY MEDICINE

## 2023-04-04 PROCEDURE — 4010F PR ACE/ARB THEARPY RXD/TAKEN: ICD-10-PCS | Mod: CPTII,,, | Performed by: FAMILY MEDICINE

## 2023-04-04 PROCEDURE — 99215 OFFICE O/P EST HI 40 MIN: CPT | Mod: 95,NTX,, | Performed by: STUDENT IN AN ORGANIZED HEALTH CARE EDUCATION/TRAINING PROGRAM

## 2023-04-04 PROCEDURE — 1111F DSCHRG MED/CURRENT MED MERGE: CPT | Mod: CPTII,,, | Performed by: FAMILY MEDICINE

## 2023-04-04 PROCEDURE — 3080F PR MOST RECENT DIASTOLIC BLOOD PRESSURE >= 90 MM HG: ICD-10-PCS | Mod: CPTII,,, | Performed by: FAMILY MEDICINE

## 2023-04-04 PROCEDURE — 3074F PR MOST RECENT SYSTOLIC BLOOD PRESSURE < 130 MM HG: ICD-10-PCS | Mod: CPTII,,, | Performed by: FAMILY MEDICINE

## 2023-04-04 RX ORDER — METHYLPREDNISOLONE ACETATE 40 MG/ML
40 INJECTION, SUSPENSION INTRA-ARTICULAR; INTRALESIONAL; INTRAMUSCULAR; SOFT TISSUE
Status: COMPLETED | OUTPATIENT
Start: 2023-04-04 | End: 2023-04-04

## 2023-04-04 RX ORDER — PREDNISONE 20 MG/1
20 TABLET ORAL 2 TIMES DAILY
Qty: 10 TABLET | Refills: 0 | Status: ON HOLD | OUTPATIENT
Start: 2023-04-04 | End: 2023-04-27 | Stop reason: HOSPADM

## 2023-04-04 RX ORDER — COLCHICINE 0.6 MG/1
0.6 TABLET ORAL 2 TIMES DAILY
Qty: 14 TABLET | Refills: 0 | Status: ON HOLD | OUTPATIENT
Start: 2023-04-04 | End: 2023-04-27 | Stop reason: HOSPADM

## 2023-04-04 RX ADMIN — METHYLPREDNISOLONE ACETATE 40 MG: 40 INJECTION, SUSPENSION INTRA-ARTICULAR; INTRALESIONAL; INTRAMUSCULAR; SOFT TISSUE at 02:04

## 2023-04-04 NOTE — PROGRESS NOTES
Subjective:       Patient ID: Hafsa Hawley is a 57 y.o. female.    Chief Complaint: Follow-up (ER follow up for right foot pain and swelling)    HPI  57 year old female comes in and is intears as she c/o pain in her right foot with accompanied swelling. She has been seen in the ER and by her pcp for this complaint. She has some swelling, redness, warmth. She has not been taking her colcrys or allopurinol as prescribed but has been taking norco for this pain. She had a markedly elevated uric acid on recent exam.  Has not missed any doses of noac recently. + h/o chf and c/o shortness of breath currently. Sats normal on exam. BP and HR also wnl.    PMH, PSH, ALLERGIES, SH, FH reviewed in nurse's notes above  Medications reconciled in the nurse's notes      Review of Systems   Constitutional:  Negative for chills and fever.   HENT:  Negative for congestion, ear pain, postnasal drip, rhinorrhea, sore throat and trouble swallowing.    Eyes:  Negative for redness and itching.   Respiratory:  Negative for cough, shortness of breath and wheezing.    Cardiovascular:  Negative for chest pain and palpitations.   Gastrointestinal:  Negative for abdominal pain, diarrhea, nausea and vomiting.   Genitourinary:  Negative for dysuria and frequency.   Musculoskeletal:  Positive for arthralgias, gait problem and joint swelling.   Skin:  Positive for color change. Negative for rash.   Neurological:  Negative for weakness and headaches.   Psychiatric/Behavioral:  Positive for dysphoric mood. The patient is nervous/anxious.      Objective:      Physical Exam  Vitals and nursing note reviewed.   Constitutional:       General: She is not in acute distress.     Appearance: She is well-developed.      Comments: Sitting in wheelchair, crying   HENT:      Head: Normocephalic and atraumatic.   Eyes:      Conjunctiva/sclera: Conjunctivae normal.      Pupils: Pupils are equal, round, and reactive to light.   Neck:      Thyroid: No  thyromegaly.   Cardiovascular:      Rate and Rhythm: Normal rate and regular rhythm.      Heart sounds: Normal heart sounds.   Pulmonary:      Effort: Pulmonary effort is normal. No respiratory distress.      Breath sounds: Normal breath sounds. No wheezing.   Abdominal:      General: Bowel sounds are normal.      Palpations: Abdomen is soft.      Tenderness: There is no abdominal tenderness.   Musculoskeletal:         General: Swelling and tenderness present. Normal range of motion.      Cervical back: Normal range of motion and neck supple.   Lymphadenopathy:      Cervical: No cervical adenopathy.   Skin:     General: Skin is warm and dry.      Findings: Erythema present. No rash.   Neurological:      Mental Status: She is alert and oriented to person, place, and time.   Psychiatric:         Behavior: Behavior normal.        Assessment/Plan:       Problem List Items Addressed This Visit    None  Visit Diagnoses       Edema of right lower extremity    -  Primary    Relevant Orders    US Lower Extremity Veins Right (Completed)    Gout, unspecified cause, unspecified chronicity, unspecified site        Relevant Medications    methylPREDNISolone acetate injection 40 mg (Completed)          Likely gout.  She has swelling to the foot, seems compliant with meds, but could have failure of noac. Will send for stat u/s.  No signs of arterial insufficiency on exam but known vascular patient.    Needs to take colchicine and allopurinol may need probenicid if not improving. On chlorthalidone.    RTC if condition acutely worsens or any other concerns, otherwise RTC as scheduled

## 2023-04-04 NOTE — PROGRESS NOTES
Atology    Palliative Medicine Clinic Note      Consult Requested By: No ref. provider found    Primary Care Physician:   Cristobal Ann MD    Reason for Consult: Advance care planning and symptom management in the setting of heart Failure     The patient location is: [facility]  The chief complaint leading to consultation is: pain    Visit type: audiovisual    Face to Face time with patient: 20min  45 minutes of total time spent on the encounter, which includes face to face time and non-face to face time preparing to see the patient (eg, review of tests), Obtaining and/or reviewing separately obtained history, Documenting clinical information in the electronic or other health record, Independently interpreting results (not separately reported) and communicating results to the patient/family/caregiver, or Care coordination (not separately reported).       Each patient provided with medical services by telemedicine is:  (1) informed of the relationship between the physician and patient and the respective role of any other health care provider with respect to management of the patient; and (2) notified that he or she may decline to receive medical services by telemedicine and may withdraw from such care at any time.                ASSESSMENT/PLAN:     Plan/Recommendations:  Diagnoses and all orders for this visit:        Congestive heart failure, unspecified HF chronicity, unspecified heart failure type /   Acute decompensated heart failure / Pulmonary HTN  -NICM since 2013 HFrEF (EF 10%) s/p AICD placement  pulmonary hypertension  chronic hypoxic and hypercapnic respiratory failure   -Not a transplant candidate  -recent admission for ADHF, now at baseline     Gout Flare  -After hospitalization for ADHF first L now R ankle  -Severe pain in R ankle unable to walk  -Norco not helping  -2 ed visits for pain  -Prednisone 40mg daily for 5 days - discussed she needs to monitor her wt and take extra diuretics if  needed  -Continue allopurinol  -referral to rheumatology sent      Fatigue/dyspnea  - discussed the importance of graded exercise- once her pain is better controlled       Depression  - discussed PM Bedhavioral therapy and  support       Nausea  -  ondansetron (ZOFRAN-ODT) 8 MG TbDL; Take 1 tablet (8 mg total) by mouth every 12 (twelve) hours as needed (nausea).  -Zofran helps        Palliative care encounter  Medicolegal: Has decision making capacity. Named her daughter Erika Estrada is HCPOA.     Psychosocial:  support system consists of granddaughter, daughter and extended faily     Spiritual: Orthodoxy    Understanding of disease and Illness Trajectory: Patient  has  adequate understanding of her illness, they can benefit from continued education on what to expect in the future.      Goals of care:    ACP Date: 02/27/2023  I engaged the patient in a conversation about advance care planning.  We discussed HCPOA, she wanted to change her current HCPOA and name her daughter Erika Estrada as her agent and her sister and son as backups. We specifically addressed what the goals of care would be moving forward, in light of the patient's change in clinical status, specifically transplant workup.  We did specifically address the patient's likely prognosis, which is poor w/o a trasnplant.  We explored the patient's values and preferences for future care.  The patient endorses that what is most important right now is to focus on curative/life-prolongation (regardless of treatment burdens)  Accordingly, we have decided that the best plan to meet the patient's goals includes continuing with treatment      Code status: full code    Advance directives:HCPOA on file       min time was spent on advance care planning, goals of care discussion, emotional support, formulating and communicating prognosis and goals of care, exploring burden/benefit of various approaches of treatment.              Follow up: 1m (team RN  Navigator will call on Friday)          SUBJECTIVE:     History obtained from: patient     complaint: No chief complaint on file.        History of Present Illness / Interval History:  Hafsa Hawley is 57 y.o. year old female presenting with heart failure .  Referred to Palliative Care for evaluation and management of physical symptoms. female attended the appointment alone     Severe pain.  Located in right ankle likely to to coat she has had 2 ED visits because of these pain give her Demerol however it only lasts little bit she is not on any anti-inflammatories she is only taking Lyrica and allopurinol.  She also endorses swelling redness and warm.  She continues to have nausea however her Zofran helps  No appetite because of the pain  Feels fatigued home today she is unable to move because the pain and she is unable to sleep because of pain.  Has tried taking Norco not helping    Has constipation however she has a laxative      -----------------------------  She endorsed pain, mainly in the legs, also endorsed body aches. She describes neuropathic pain in her legs. Will start lyrica soon  Unable to walk much because of sob, not walking long distances  Has nausea and vomits every morning, taking promethazine or zofran  Feels depressed at times because of her condition  Her appetite is up and down   Feels tired every day all day  Not sleeping well, because of twitching and shaking at night  Intermittent diarrhea and constipation, takes immodium and miralax intermittently           Disease History:  NICM since 2013 HFrEF (EF 10%) s/p AICD placement  pulmonary hypertension  chronic hypoxic and hypercapnic respiratory failure   Paroxysmal A Fib on Eliquis, Pulmonary Hypertension  Hypertension, Type 2 Diabetes Mellitus     evaluated by our team and presented to committee on 3/9/22.  At the time she was declined for OHT or LVAD due to renal function, lung function and difficulty consistently following up -  undergoing evaluation for heart-kidney transplant    Previous experience or exposure to a serious illness: no      External  database queried on 04/04/2023  by Sandra Hernandes .   The results reviewed and considered with the clinical data in the decision whether or not to prescribe a controlled substance.      Medications:    Current Outpatient Medications:     albuterol-ipratropium (DUO-NEB) 2.5 mg-0.5 mg/3 mL nebulizer solution, Take 3 mLs by nebulization 2 (two) times a day., Disp: 90 mL, Rfl: 3    allopurinoL (ZYLOPRIM) 100 MG tablet, Take 1 tablet (100 mg total) by mouth daily as needed (gout attack)., Disp: , Rfl:     amiodarone (PACERONE) 200 MG Tab, Take 200 mg by mouth., Disp: , Rfl:     apixaban (ELIQUIS) 5 mg Tab, Take 1 tablet (5 mg total) by mouth 2 (two) times daily., Disp: 60 tablet, Rfl: 2    atorvastatin (LIPITOR) 40 MG tablet, Take 1 tablet (40 mg total) by mouth every evening., Disp: 90 tablet, Rfl: 3    b complex vitamins tablet, Take 1 tablet by mouth once daily., Disp: , Rfl:     blood sugar diagnostic (ACCU-CHEK GUIDE TEST STRIPS) Strp, 1 strip by Other route 3 (three) times daily., Disp: 100 each, Rfl: 11    bumetanide (BUMEX) 2 MG tablet, Take 1 tablet (2 mg total) by mouth 2 (two) times a day., Disp: 60 tablet, Rfl: 11    cetirizine (ZYRTEC) 10 MG tablet, Take 10 mg by mouth daily as needed for Allergies., Disp: , Rfl:     chlorthalidone (HYGROTEN) 25 MG Tab, Take 1 tablet (25 mg total) by mouth daily as needed., Disp: 30 tablet, Rfl: 11    diclofenac sodium (VOLTAREN) 1 % Gel, APPLY 2 G TOPICALLY 3 (THREE) TIMES DAILY AS NEEDED (PAIN)., Disp: 400 g, Rfl: 0    empagliflozin (JARDIANCE) 10 mg tablet, TAKE 1 TABLET BY MOUTH EVERY DAY, Disp: 30 tablet, Rfl: 5    ergocalciferol (ERGOCALCIFEROL) 50,000 unit Cap, Take 1 capsule (50,000 Units total) by mouth every 7 days. (Patient taking differently: Take 50,000 Units by mouth every Saturday.), Disp: 4 capsule, Rfl: 5    ferrous sulfate 325  (65 FE) MG EC tablet, Take 1 tablet (325 mg total) by mouth once daily., Disp: 30 tablet, Rfl: 0    fluticasone propionate (FLONASE) 50 mcg/actuation nasal spray, 2 sprays by Each Nostril route daily as needed for Rhinitis. , Disp: , Rfl:     isosorbide dinitrate (ISORDIL) 30 MG Tab, Take 1 tablet (30 mg total) by mouth 3 (three) times daily., Disp: 90 tablet, Rfl: 11    lancets (ACCU-CHEK SOFTCLIX LANCETS) Misc, 1 Device by Misc.(Non-Drug; Combo Route) route 3 (three) times daily., Disp: 100 each, Rfl: 11    LIDOcaine (LIDODERM) 5 %, Place 1 patch onto the skin daily as needed (pain). Remove & Discard patch within 12 hours or as directed by MD, Disp: , Rfl:     metoprolol succinate (TOPROL-XL) 50 MG 24 hr tablet, Take 1 tablet (50 mg total) by mouth once daily., Disp: 30 tablet, Rfl: 11    mirtazapine (REMERON) 7.5 MG Tab, Take 1 tablet (7.5 mg total) by mouth every evening., Disp: 30 tablet, Rfl: 1    mometasone-formoterol (DULERA) 200-5 mcg/actuation inhaler, Inhale 2 puffs into the lungs 2 (two) times daily. Controller, Disp: 13 g, Rfl: 11    ondansetron (ZOFRAN-ODT) 8 MG TbDL, Take 1 tablet (8 mg total) by mouth every 12 (twelve) hours as needed (nausea)., Disp: , Rfl:     pantoprazole (PROTONIX) 40 MG tablet, TAKE 1 TABLET BY MOUTH DAILY IN THE MORNING, Disp: 30 tablet, Rfl: 11    potassium chloride (MICRO-K) 10 MEQ CpSR, Take 1 capsule (10 mEq total) by mouth once daily., Disp: 30 capsule, Rfl: 11    pregabalin (LYRICA) 25 MG capsule, Take 1 capsule (25 mg total) by mouth 2 (two) times daily., Disp: 60 capsule, Rfl: 5    promethazine-codeine 6.25-10 mg/5 ml (PHENERGAN WITH CODEINE) 6.25-10 mg/5 mL syrup, TAKE 5 MLS BY MOUTH EVERY 4-6 HOURS AS NEEDED, Disp: 177 mL, Rfl: 0    sacubitriL-valsartan (ENTRESTO) 49-51 mg per tablet, TAKE 1 TABLET BY MOUTH TWICE A DAY, Disp: 60 tablet, Rfl: 5    scopolamine (TRANSDERM-SCOP) 1.3-1.5 mg (1 mg over 3 days), Place 1 patch onto the skin every 72 hours as needed (nausea).,  Disp: 24 patch, Rfl: 2    semaglutide (OZEMPIC) 0.25 mg or 0.5 mg(2 mg/1.5 mL) pen injector, Take 0.25 mg for 4 weeks, then increase to 0.5 mg weekly, Disp: 1 pen, Rfl: 0    tiotropium bromide (SPIRIVA RESPIMAT) 1.25 mcg/actuation inhaler, Inhale 2 puffs into the lungs once daily. Controller, Disp: 4 g, Rfl: 5    traZODone (DESYREL) 50 MG tablet, Take 25 mg by mouth nightly as needed., Disp: , Rfl:     VENTOLIN HFA 90 mcg/actuation inhaler, Inhale 2 puffs into the lungs every 6 (six) hours as needed for Shortness of Breath or Wheezing., Disp: , Rfl: 11  No current facility-administered medications for this visit.    Facility-Administered Medications Ordered in Other Visits:     0.9%  NaCl infusion, , Intravenous, Continuous, Corinna Hayes NP, New Bag at 05/23/19 0745    vancomycin in dextrose 5 % 1 gram/250 mL IVPB 1,000 mg, 1,000 mg, Intravenous, On Call Procedure, Corinna Hayes NP, 1,000 mg at 05/23/19 0736      Past Medical History:   Diagnosis Date    Anemia     Arthritis     Atrial fibrillation     OAC    Chronic respiratory failure with hypoxia, on home oxygen therapy     2L with activity, off at rest.  Per Pulm  no overt evidence of ILD or COPD on PFTs and CT to explain O2 needs.    CKD (chronic kidney disease), stage IV 05/08/2018    Congestive heart failure     s/p AICD placement,    Deep vein thrombosis     Depression     elevated bilirubin d/t Gilbert's syndrome     confirmed by [facility] genetic testing, evaluated by hepatology    Encounter for blood transfusion     GERD (gastroesophageal reflux disease)     Hypertension     Pheochromocytoma, malignant     Right kidney mass     Sleep apnea     Thalassemia trait, alpha     Thyroid disease     Type 2 diabetes mellitus with hyperglycemia, without long-term current use of insulin 08/13/2020     Past Surgical History:   Procedure Laterality Date    APPENDECTOMY      BONE MARROW BIOPSY      CARDIAC DEFIBRILLATOR PLACEMENT Left 12/2016    CARDIAC  ELECTROPHYSIOLOGY MAPPING AND ABLATION      CARDIAC ELECTROPHYSIOLOGY MAPPING AND ABLATION      COLONOSCOPY N/A 2022    Procedure: COLONOSCOPY;  Surgeon: Arely Betancourt MD;  Location: [facility] (2ND FLR);  Service: Endoscopy;  Laterality: N/A;  heart transplant candidate/ EF 25% - 2nd floor/ defib - [facility] - ERW  Eliquis - per Dr. Cortez with [facility], Pt ok to hold Eliquis x 2 days prior-see media tab-outside correspondence dated 21  - ERW  verbal instructions/portal instructions/email instructions - s    EYE SURGERY      due to running tears    FRACTURE SURGERY Left     hand 5th digit    HYSTERECTOMY      KNEE SURGERY Left 2016    hematoma    LIVER BIOPSY  10/24/2018    Minimal steatosis, predominantly macrovesicular, 1%, Minimal nonspecific portal inflammation, no fibrosis. No findings on biopsy to explain elevated bilirubin levels. Could be d/t Gilbert's =?- hemolysis    RIGHT HEART CATHETERIZATION Right 2021    Procedure: INSERTION, CATHETER, RIGHT HEART;  Surgeon: Irving Cardenas MD;  Location: [facility] CATH LAB;  Service: Cardiology;  Laterality: Right;    RIGHT HEART CATHETERIZATION Right 2022    Procedure: INSERTION, CATHETER, RIGHT HEART;  Surgeon: Burke Camilo MD;  Location: [facility] CATH LAB;  Service: Cardiology;  Laterality: Right;    RIGHT HEART CATHETERIZATION Right 3/29/2023    Procedure: INSERTION, CATHETER, RIGHT HEART;  Surgeon: Katie Liriano DO;  Location: [facility] CATH LAB;  Service: Cardiology;  Laterality: Right;    TRANSJUGULAR BIOPSY OF LIVER N/A 10/24/2018    Procedure: BIOPSY, LIVER, TRANSJUGULAR APPROACH;  Surgeon: Carmen Diagnostic Provider;  [facility];  Service: Radiology;  Laterality: N/A;     Family History   Problem Relation Age of Onset    Cancer Mother         pancreatic CA early 50's    Heart disease Father          MI in late 50's    Hypertension Father     Heart attack Father     Heart disease Sister     Heart disease Brother   "   Cirrhosis Brother         alcoholic    Heart disease Sister     Heart disease Brother     Hypertension Brother     Diabetes Brother      Review of patient's allergies indicates:   Allergen Reactions    Penicillins Hives and Other (See Comments)    Iodinated contrast media Nausea And Vomiting    Oxycodone-acetaminophen Other (See Comments)     Nausea, Dizziness, Anxiety.  "I don't like how it makes me feel."   Given Hydromorphone 0.5mg IVP  Without problems.  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 Metabolism of other drugs not listed here may also be impacted by UGT1A1 enzyme activity.       Tramadol Nausea And Vomiting and Other (See Comments)     Other reaction(s): Other (See Comments)    Valsartan Other (See Comments)       OBJECTIVE:       ROS:  Review of Systems   Constitutional:  Positive for appetite change and fatigue. Negative for activity change and unexpected weight change.   Respiratory:  Positive for shortness of breath. Negative for cough and wheezing.    Cardiovascular:  Negative for chest pain and leg swelling.   Gastrointestinal:  Positive for constipation and nausea. Negative for diarrhea and vomiting.   Genitourinary:  Negative for dysuria.   Musculoskeletal:  Positive for leg pain. Negative for arthralgias, back pain and gait problem.   Neurological:  Negative for dizziness, weakness and memory loss.   Psychiatric/Behavioral:  Negative for " "dysphoric mood. The patient is not nervous/anxious.        Review of Symptoms      Symptom Assessment (ESAS 0-10 Scale)  Pain:  10  Dyspnea:  0  Anxiety:  0  Nausea:  5  Depression:  0  Anorexia:  5  Fatigue:  7  Insomnia:  9  Restlessness:  0  Agitation:  0     CAM / Delirium:  Negative  Constipation:  Negative  Diarrhea:  Negative    Anxiety:  Is not nervous/anxious  Constipation:  Constipation    Pain Assessment:    Location(s): leg    Leg       Location: bilateral        Quality: Aching        Quantity: 10/10 in intensity        Chronicity: Onset 1 week(s) ago, rapidly worsening        Aggravating Factors: None        Alleviating Factors: None        Associated Symptoms: None    Performance Status:  60    Living Arrangements:  Lives with family    Psychosocial/Cultural:   See Palliative Psychosocial Note: Yes  Lives with  and granddaughter. Pt lives in a mobile home with 5 NIRAJ. Pt also has support from her two sisters Benoit Baum [phone], Jeanie Jaimes [phone]. Pt is mostly independent with ADL's, but has a RW, SC and home oxygen, which she states she uses "as needed".   dgtr Erika Estrada, 33, Roldan, son Miguel Baum, 35  **Primary  to Follow**  Palliative Care  Consult: Yes    Spiritual:  F - Damaris and Belief:  Sabianist     Advance Care Planning   Advance Directives:   Living Will: No    LaPOST: No    Do Not Resuscitate Status: No    Medical Power of : Yes    Agent's Name:  Erika Estrada    Decision Making:  Patient answered questions  Goals of Care: What is most important right now is to focus on curative/life-prolongation (regardless of treatment burdens), remaining as independent as possible, extending life as long as possible, even it it means sacrificing quality. Accordingly, we have decided that the best plan to meet the patient's goals includes continuing with treatment.            Physical Exam:  Vitals:    Physical Exam  Constitutional:       " General: She is not in acute distress.     Comments: Lying in bed, visibly uncomfortable due to pain   HENT:      Head: Normocephalic and atraumatic.   Eyes:      General: No scleral icterus.  Pulmonary:      Effort: Pulmonary effort is normal. No respiratory distress.   Abdominal:      General: There is no distension.   Musculoskeletal:      Cervical back: Neck supple.   Skin:     Findings: No rash.   Neurological:      Mental Status: She is alert and oriented to person, place, and time.   Psychiatric:         Mood and Affect: Mood normal. Affect is tearful.         Labs:  CBC:   WBC   Date Value Ref Range Status   04/02/2023 4.54 3.90 - 12.70 K/uL Final       Hemoglobin   Date Value Ref Range Status   04/02/2023 9.4 (L) 12.0 - 16.0 g/dL Final       POC Hematocrit   Date Value Ref Range Status   12/07/2021 45 36 - 54 %PCV Final     Hematocrit   Date Value Ref Range Status   04/02/2023 30.6 (L) 37.0 - 48.5 % Final       MCV   Date Value Ref Range Status   04/02/2023 57 (L) 82 - 98 fL Final       Platelets   Date Value Ref Range Status   04/02/2023 165 150 - 450 K/uL Final       LFT:   Lab Results   Component Value Date    AST 19 04/02/2023    ALKPHOS 46 (L) 04/02/2023    BILITOT 1.4 (H) 04/02/2023       Albumin:   Albumin   Date Value Ref Range Status   04/02/2023 3.7 3.5 - 5.2 g/dL Final     Protein:   Total Protein   Date Value Ref Range Status   04/02/2023 6.9 6.0 - 8.4 g/dL Final         Radiology:I have reviewed all pertinent imaging results/findings within the past 24 hours.  Results for orders placed during the hospital encounter of 11/15/22    Echo    Interpretation Summary  · The left ventricle is severely enlarged with eccentric hypertrophy and severely decreased systolic function. The estimated ejection fraction is 10%.  · The right ventricle is not well visualized but appears normal in size with moderately reduced systolic function.  · Grade II left ventricular diastolic dysfunction.  · Biatrial  enlargement.  · The estimated PA systolic pressure is 65 mmHg.  · Elevated central venous pressure (15 mmHg).  · Small posterolateral pericardial effusion.        I spent a total of 46 minutes on the day of the visit. This includes face to face time in discussion of goals of care, symptom assessment, coordination of care and emotional support.  This also includes non-face to face time preparing to see the patient (eg, review of tests/imaging), obtaining and/or reviewing separately obtained history, documenting clinical information in the electronic or other health record, independently interpreting results and communicating results to the patient/family/caregiver, or care coordinator.         This note was partially created using [employer] Voice Recognition software. Typographical and content errors may occur with this process. While efforts are made to detect and correct such errors, in some cases errors will persist. For this reason, wording in this document should be considered in the proper context and not strictly verbatim.      Signature: Sandra Hernandes MD

## 2023-04-06 ENCOUNTER — TELEPHONE (OUTPATIENT)
Dept: PALLIATIVE MEDICINE | Facility: CLINIC | Age: 58
End: 2023-04-06
Payer: MEDICAID

## 2023-04-06 NOTE — TELEPHONE ENCOUNTER
Called patient to check on her. She states that she feeling a little better and that the swelling in her leg and ankle has gone down and it is not as painful. She is taking the prednisone that Dr. Michel prescribed. She states that she has our clinic number and will call with concerns.

## 2023-04-10 DIAGNOSIS — M51.36 LUMBAR DEGENERATIVE DISC DISEASE: ICD-10-CM

## 2023-04-10 NOTE — TELEPHONE ENCOUNTER
----- Message from Frank Jj sent at 2023  4:06 PM CDT -----  Contact: SELF  Hafsa Hawley  MRN: [id number]  : 1965  PCP: Cristobal Ann  Home Phone      [phone]  Work Phone      Not on file.  Mobile          [phone]  Home Phone      [phone]      MESSAGE:   Pt requesting refill or new Rx.   Is this a refill or new RX:  refill  RX name and strength:   HYDROcodone-acetaminophen (NORCO) 5-325 mg  LIDOcaine (LIDODERM) 5 %    Last office visit: 23  Is this a 30-day or 90-day RX:  30 DAY  Pharmacy name and location:  [facility]  Comments:      Phone:  [phone]

## 2023-04-10 NOTE — TELEPHONE ENCOUNTER
Pt also requesting refill on HYDROcodone-acetaminophen (NORCO) 5-325 mg per tablet .      LOV: 04/04/23
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
## 2023-04-11 DIAGNOSIS — M79.672 CHRONIC FOOT PAIN, LEFT: Primary | ICD-10-CM

## 2023-04-11 DIAGNOSIS — G89.29 CHRONIC FOOT PAIN, LEFT: Primary | ICD-10-CM

## 2023-04-11 RX ORDER — HYDROCODONE BITARTRATE AND ACETAMINOPHEN 5; 325 MG/1; MG/1
1 TABLET ORAL EVERY 8 HOURS PRN
Qty: 21 TABLET | Refills: 0 | Status: SHIPPED | OUTPATIENT
Start: 2023-04-11 | End: 2023-04-18

## 2023-04-11 RX ORDER — LIDOCAINE 50 MG/G
1 PATCH TOPICAL DAILY PRN
Start: 2023-04-11 | End: 2023-05-08 | Stop reason: SDUPTHER

## 2023-04-13 DIAGNOSIS — I42.8 NICM (NONISCHEMIC CARDIOMYOPATHY): Primary | ICD-10-CM

## 2023-04-17 ENCOUNTER — TELEPHONE (OUTPATIENT)
Dept: TRANSPLANT | Facility: CLINIC | Age: 58
End: 2023-04-17

## 2023-04-17 ENCOUNTER — TELEPHONE (OUTPATIENT)
Dept: ENDOCRINOLOGY | Facility: CLINIC | Age: 58
End: 2023-04-17
Payer: MEDICAID

## 2023-04-17 ENCOUNTER — OFFICE VISIT (OUTPATIENT)
Dept: TRANSPLANT | Facility: CLINIC | Age: 58
End: 2023-04-17
Payer: MEDICAID

## 2023-04-17 ENCOUNTER — LAB VISIT (OUTPATIENT)
Dept: LAB | Facility: HOSPITAL | Age: 58
End: 2023-04-17
Attending: INTERNAL MEDICINE
Payer: MEDICAID

## 2023-04-17 VITALS
HEIGHT: 66 IN | HEART RATE: 91 BPM | DIASTOLIC BLOOD PRESSURE: 84 MMHG | SYSTOLIC BLOOD PRESSURE: 132 MMHG | BODY MASS INDEX: 32.06 KG/M2 | WEIGHT: 199.5 LBS

## 2023-04-17 DIAGNOSIS — E11.22 TYPE 2 DIABETES MELLITUS WITH STAGE 4 CHRONIC KIDNEY DISEASE, WITHOUT LONG-TERM CURRENT USE OF INSULIN: Primary | ICD-10-CM

## 2023-04-17 DIAGNOSIS — J96.11 CHRONIC RESPIRATORY FAILURE WITH HYPOXIA AND HYPERCAPNIA: ICD-10-CM

## 2023-04-17 DIAGNOSIS — I48.0 PAROXYSMAL ATRIAL FIBRILLATION: Chronic | ICD-10-CM

## 2023-04-17 DIAGNOSIS — R06.02 SHORTNESS OF BREATH: ICD-10-CM

## 2023-04-17 DIAGNOSIS — I50.42 CHRONIC COMBINED SYSTOLIC AND DIASTOLIC HEART FAILURE: Chronic | ICD-10-CM

## 2023-04-17 DIAGNOSIS — I42.8 NICM (NONISCHEMIC CARDIOMYOPATHY): Chronic | ICD-10-CM

## 2023-04-17 DIAGNOSIS — Z95.810 ICD (IMPLANTABLE CARDIOVERTER-DEFIBRILLATOR) IN PLACE: Chronic | ICD-10-CM

## 2023-04-17 DIAGNOSIS — I50.42 CHRONIC COMBINED SYSTOLIC AND DIASTOLIC HEART FAILURE: ICD-10-CM

## 2023-04-17 DIAGNOSIS — I27.20 PULMONARY HYPERTENSION: ICD-10-CM

## 2023-04-17 DIAGNOSIS — I27.20 PULMONARY HTN: Primary | Chronic | ICD-10-CM

## 2023-04-17 DIAGNOSIS — J96.12 CHRONIC RESPIRATORY FAILURE WITH HYPOXIA AND HYPERCAPNIA: ICD-10-CM

## 2023-04-17 DIAGNOSIS — I42.8 NICM (NONISCHEMIC CARDIOMYOPATHY): Primary | ICD-10-CM

## 2023-04-17 DIAGNOSIS — N18.4 TYPE 2 DIABETES MELLITUS WITH STAGE 4 CHRONIC KIDNEY DISEASE, WITHOUT LONG-TERM CURRENT USE OF INSULIN: Primary | ICD-10-CM

## 2023-04-17 LAB
ALBUMIN SERPL BCP-MCNC: 3.6 G/DL (ref 3.5–5.2)
ALP SERPL-CCNC: 76 U/L (ref 55–135)
ALT SERPL W/O P-5'-P-CCNC: 35 U/L (ref 10–44)
ANION GAP SERPL CALC-SCNC: 10 MMOL/L (ref 8–16)
AST SERPL-CCNC: 23 U/L (ref 10–40)
BILIRUB SERPL-MCNC: 2.2 MG/DL (ref 0.1–1)
BUN SERPL-MCNC: 70 MG/DL (ref 6–20)
CALCIUM SERPL-MCNC: 9.5 MG/DL (ref 8.7–10.5)
CHLORIDE SERPL-SCNC: 108 MMOL/L (ref 95–110)
CO2 SERPL-SCNC: 25 MMOL/L (ref 23–29)
CREAT SERPL-MCNC: 2.5 MG/DL (ref 0.5–1.4)
EST. GFR  (NO RACE VARIABLE): 21.9 ML/MIN/1.73 M^2
GLUCOSE SERPL-MCNC: 168 MG/DL (ref 70–110)
POTASSIUM SERPL-SCNC: 5.4 MMOL/L (ref 3.5–5.1)
PROT SERPL-MCNC: 6.5 G/DL (ref 6–8.4)
SODIUM SERPL-SCNC: 143 MMOL/L (ref 136–145)

## 2023-04-17 PROCEDURE — 3044F PR MOST RECENT HEMOGLOBIN A1C LEVEL <7.0%: ICD-10-PCS | Mod: CPTII,NTX,, | Performed by: INTERNAL MEDICINE

## 2023-04-17 PROCEDURE — 80053 COMPREHEN METABOLIC PANEL: CPT | Mod: NTX | Performed by: INTERNAL MEDICINE

## 2023-04-17 PROCEDURE — 99215 OFFICE O/P EST HI 40 MIN: CPT | Mod: S$PBB,NTX,, | Performed by: INTERNAL MEDICINE

## 2023-04-17 PROCEDURE — 3075F PR MOST RECENT SYSTOLIC BLOOD PRESS GE 130-139MM HG: ICD-10-PCS | Mod: CPTII,NTX,, | Performed by: INTERNAL MEDICINE

## 2023-04-17 PROCEDURE — 3079F PR MOST RECENT DIASTOLIC BLOOD PRESSURE 80-89 MM HG: ICD-10-PCS | Mod: CPTII,NTX,, | Performed by: INTERNAL MEDICINE

## 2023-04-17 PROCEDURE — 4010F PR ACE/ARB THEARPY RXD/TAKEN: ICD-10-PCS | Mod: CPTII,NTX,, | Performed by: INTERNAL MEDICINE

## 2023-04-17 PROCEDURE — 3008F BODY MASS INDEX DOCD: CPT | Mod: CPTII,NTX,, | Performed by: INTERNAL MEDICINE

## 2023-04-17 PROCEDURE — 3079F DIAST BP 80-89 MM HG: CPT | Mod: CPTII,NTX,, | Performed by: INTERNAL MEDICINE

## 2023-04-17 PROCEDURE — 1111F DSCHRG MED/CURRENT MED MERGE: CPT | Mod: CPTII,NTX,, | Performed by: INTERNAL MEDICINE

## 2023-04-17 PROCEDURE — 3008F PR BODY MASS INDEX (BMI) DOCUMENTED: ICD-10-PCS | Mod: CPTII,NTX,, | Performed by: INTERNAL MEDICINE

## 2023-04-17 PROCEDURE — 4010F ACE/ARB THERAPY RXD/TAKEN: CPT | Mod: CPTII,NTX,, | Performed by: INTERNAL MEDICINE

## 2023-04-17 PROCEDURE — 99215 PR OFFICE/OUTPT VISIT, EST, LEVL V, 40-54 MIN: ICD-10-PCS | Mod: S$PBB,NTX,, | Performed by: INTERNAL MEDICINE

## 2023-04-17 PROCEDURE — 36415 COLL VENOUS BLD VENIPUNCTURE: CPT | Mod: TXP | Performed by: INTERNAL MEDICINE

## 2023-04-17 PROCEDURE — 99999 PR PBB SHADOW E&M-EST. PATIENT-LVL III: CPT | Mod: PBBFAC,TXP,, | Performed by: INTERNAL MEDICINE

## 2023-04-17 PROCEDURE — 99213 OFFICE O/P EST LOW 20 MIN: CPT | Mod: PBBFAC,NTX | Performed by: INTERNAL MEDICINE

## 2023-04-17 PROCEDURE — 83880 ASSAY OF NATRIURETIC PEPTIDE: CPT | Mod: NTX | Performed by: INTERNAL MEDICINE

## 2023-04-17 PROCEDURE — 99999 PR PBB SHADOW E&M-EST. PATIENT-LVL III: ICD-10-PCS | Mod: PBBFAC,TXP,, | Performed by: INTERNAL MEDICINE

## 2023-04-17 PROCEDURE — 1111F PR DISCHARGE MEDS RECONCILED W/ CURRENT OUTPATIENT MED LIST: ICD-10-PCS | Mod: CPTII,NTX,, | Performed by: INTERNAL MEDICINE

## 2023-04-17 PROCEDURE — 3075F SYST BP GE 130 - 139MM HG: CPT | Mod: CPTII,NTX,, | Performed by: INTERNAL MEDICINE

## 2023-04-17 PROCEDURE — 3044F HG A1C LEVEL LT 7.0%: CPT | Mod: CPTII,NTX,, | Performed by: INTERNAL MEDICINE

## 2023-04-17 RX ORDER — ALPRAZOLAM 2 MG/1
2 TABLET ORAL 2 TIMES DAILY
COMMUNITY
Start: 2023-04-11 | End: 2023-05-24

## 2023-04-17 NOTE — PROGRESS NOTES
Subjective:   Patient ID:  Hafsa Hawley is a 57 y.o. female who presents for follow-up of CHF.    58 YO F who is well known to our team. She has a PMH of NICM, HFrEF, LVEF 10%, LVEDD 7.3 cm, s/p ICD, pulmonary hypertension, DM, HTN, permanent AF who presents for follow up.    She was previously evaluated by our team and presented to committee on 3/9/22. Per the noted, at the time she was declined for OHT or LVAD due to renal function, lung function and difficulty consistently following up with the team. I saw her in December of 2022 at which time she had been seen by pulmonology and based on their interpretation of workup thus far there was no overt evidence of ILD or COPD on PFTs and CT chest so it was unclear as to the reason for the chronic hypoxic and hypercapnic respiratory failure. She wished to be considered for OHT again and evaluation was opened a second time. She had several appointments with me as well as Dr Orozco. During these appointments we had asked her to bring in either a list of her medications or her meds. Unfortunately she was unable to do this and was confused as to what meds she is on. She also had a few admissions to the hospital for ADHF (primarily related to dietary or medical non adherence). She was presented to committee again on 3/29/23 and was declined for Heart Transplant listing due to poor medical adherence, declined pulmonary function, and declined renal function.      She presents today for follow-up.  She says that since her last hospital admission, overall she has been doing better.  She notes occasional exertional chest discomfort and shortness of breath, but this is improved when compared with before.  Denies any palpitations, presyncope, or syncope.  No shocks from her ICD.  No PND or orthopnea.  Although she had leg swelling at the time of her hospital admission she has not had a recurrence of this since discharge.  Her weight has been stable.  She is continued her  current medications which includes Bumex 1 mg b.i.d.,    Review of Systems   Constitutional: Negative for chills and fever.   HENT:  Negative for hearing loss.    Eyes:  Negative for visual disturbance.   Cardiovascular:  Positive for dyspnea on exertion. Negative for chest pain, irregular heartbeat, leg swelling, orthopnea, palpitations, paroxysmal nocturnal dyspnea and syncope.   Respiratory:  Positive for shortness of breath. Negative for cough.    Musculoskeletal:  Negative for muscle weakness.   Gastrointestinal:  Negative for diarrhea, nausea and vomiting.   Neurological:  Negative for focal weakness.   Psychiatric/Behavioral:  Negative for memory loss.      Objective:     Vitals:    04/17/23 1309   BP: 132/84   Pulse: 91     Physical Exam  Constitutional:       Appearance: Normal appearance.   HENT:      Head: Atraumatic.   Eyes:      Extraocular Movements: Extraocular movements intact.   Cardiovascular:      Rate and Rhythm: Normal rate and regular rhythm.      Pulses: Normal pulses.      Heart sounds: Normal heart sounds.      Comments: JVP not visible at 45 degrees.  Pulmonary:      Breath sounds: Normal breath sounds.   Abdominal:      Palpations: Abdomen is soft.      Tenderness: There is no abdominal tenderness.   Musculoskeletal:         General: Normal range of motion.      Right lower leg: No edema.      Left lower leg: No edema.   Neurological:      General: No focal deficit present.      Mental Status: She is alert and oriented to person, place, and time.       Assessment:      1. Pulmonary HTN    2. Chronic respiratory failure with hypoxia and hypercapnia    3. Shortness of breath    4. Pulmonary hypertension    5. Paroxysmal atrial fibrillation    6. NICM (nonischemic cardiomyopathy)    7. Chronic combined systolic and diastolic heart failure    8. ICD (implantable cardioverter-defibrillator) in place        Plan:     NICM  HFrEF, LVEF 15%  S/p ICD  - Presents today for follow up after recent  hospital admission  - Clinically euvolemic, NYHA class II-III symptoms  - Recommended that she continue current GDMT which includes metoprolol succinate 50 gm daily, medium dose entresto, and jardiance. Not on MRA due to renal funciton  - Volume mgmt with Bumex 1 mg BID  - She has been evaluated for OHT twice here and been turned down due to her lung function and medical adherence. I again stressed the importance of taking her medications as prescribed and reaching out to her doctors if she has any confusion rather than stopping/starting meds on her own. Her primary cardiologist is Dr. Benson Cortez MD. Although her imaging does not have any evidence of COPD/ILD, she has marked reduction in DLCO which could be related to PAH from her HF.  - I did also say that she is welcome to seek a second opinion at other centers with regards to transplant candidacy. She says she has some family in the [place] area and would potentially consider going there.

## 2023-04-17 NOTE — TELEPHONE ENCOUNTER
Reviewed today's labs with DR Guardado who saw pt in clinic earlier today.   K 5.4.     Per MD, repeat bmp this week .     Spoke with pt who repeats she eats bananas 4 times per week and drinks OJ often.  Advised to cut back on both of these.  She will have repeat bmp on Wednesday  in [place].

## 2023-04-18 LAB — NT-PROBNP SERPL IA-MCNC: ABNORMAL PG/ML

## 2023-04-19 ENCOUNTER — LAB VISIT (OUTPATIENT)
Dept: LAB | Facility: HOSPITAL | Age: 58
End: 2023-04-19
Attending: INTERNAL MEDICINE
Payer: MEDICAID

## 2023-04-19 ENCOUNTER — TELEPHONE (OUTPATIENT)
Dept: ENDOCRINOLOGY | Facility: CLINIC | Age: 58
End: 2023-04-19
Payer: MEDICAID

## 2023-04-19 DIAGNOSIS — I42.8 NICM (NONISCHEMIC CARDIOMYOPATHY): ICD-10-CM

## 2023-04-19 LAB
ANION GAP SERPL CALC-SCNC: 11 MMOL/L (ref 8–16)
BUN SERPL-MCNC: 56 MG/DL (ref 6–20)
CALCIUM SERPL-MCNC: 9.1 MG/DL (ref 8.7–10.5)
CHLORIDE SERPL-SCNC: 104 MMOL/L (ref 95–110)
CO2 SERPL-SCNC: 22 MMOL/L (ref 23–29)
CREAT SERPL-MCNC: 2.4 MG/DL (ref 0.5–1.4)
EST. GFR  (NO RACE VARIABLE): 23 ML/MIN/1.73 M^2
GLUCOSE SERPL-MCNC: 172 MG/DL (ref 70–110)
POTASSIUM SERPL-SCNC: 5.1 MMOL/L (ref 3.5–5.1)
SODIUM SERPL-SCNC: 137 MMOL/L (ref 136–145)

## 2023-04-19 PROCEDURE — 80048 BASIC METABOLIC PNL TOTAL CA: CPT | Mod: NTX | Performed by: INTERNAL MEDICINE

## 2023-04-19 PROCEDURE — 36415 COLL VENOUS BLD VENIPUNCTURE: CPT | Mod: NTX | Performed by: INTERNAL MEDICINE

## 2023-04-19 NOTE — TELEPHONE ENCOUNTER
Patient notified that medication was sent in on 23, verbalized understanding.        ----- Message from Flor vEans sent at 2023 10:51 AM CDT -----  Contact: SELF  Hafsa Hawley  MRN: [id number]  : 1965  PCP: Cristobal Ann  Home Phone      [phone]  Work Phone      Not on file.  Mobile          [phone]  Home Phone      [phone]    MESSAGE: Refill on [facility]   Pharmacy [facility]        PHONE [phone]

## 2023-04-26 ENCOUNTER — HOSPITAL ENCOUNTER (EMERGENCY)
Facility: HOSPITAL | Age: 58
Discharge: ANOTHER HOSPITAL | End: 2023-04-27
Attending: SURGERY
Payer: MEDICAID

## 2023-04-26 ENCOUNTER — CLINICAL SUPPORT (OUTPATIENT)
Dept: CARDIOLOGY | Facility: HOSPITAL | Age: 58
DRG: 291 | End: 2023-04-26
Payer: MEDICAID

## 2023-04-26 DIAGNOSIS — Z95.810 PRESENCE OF AUTOMATIC (IMPLANTABLE) CARDIAC DEFIBRILLATOR: ICD-10-CM

## 2023-04-26 DIAGNOSIS — I50.9 CONGESTIVE HEART FAILURE, UNSPECIFIED HF CHRONICITY, UNSPECIFIED HEART FAILURE TYPE: ICD-10-CM

## 2023-04-26 DIAGNOSIS — I27.20 PULMONARY HTN: Chronic | ICD-10-CM

## 2023-04-26 DIAGNOSIS — I21.4 NSTEMI (NON-ST ELEVATED MYOCARDIAL INFARCTION): Primary | ICD-10-CM

## 2023-04-26 DIAGNOSIS — R07.9 CHEST PAIN, UNSPECIFIED TYPE: ICD-10-CM

## 2023-04-26 LAB
ALBUMIN SERPL BCP-MCNC: 3.9 G/DL (ref 3.5–5.2)
ALP SERPL-CCNC: 63 U/L (ref 55–135)
ALT SERPL W/O P-5'-P-CCNC: 39 U/L (ref 10–44)
AMPHET+METHAMPHET UR QL: NEGATIVE
ANION GAP SERPL CALC-SCNC: 13 MMOL/L (ref 8–16)
ANISOCYTOSIS BLD QL SMEAR: ABNORMAL
AST SERPL-CCNC: 24 U/L (ref 10–40)
BACTERIA #/AREA URNS HPF: ABNORMAL /HPF
BARBITURATES UR QL SCN>200 NG/ML: NEGATIVE
BASOPHILS # BLD AUTO: 0.02 K/UL (ref 0–0.2)
BASOPHILS NFR BLD: 0.3 % (ref 0–1.9)
BENZODIAZ UR QL SCN>200 NG/ML: NEGATIVE
BILIRUB SERPL-MCNC: 3.2 MG/DL (ref 0.1–1)
BILIRUB UR QL STRIP: NEGATIVE
BNP SERPL-MCNC: >4900 PG/ML (ref 0–99)
BUN SERPL-MCNC: 81 MG/DL (ref 6–20)
BZE UR QL SCN: NEGATIVE
CALCIUM SERPL-MCNC: 9.7 MG/DL (ref 8.7–10.5)
CANNABINOIDS UR QL SCN: NEGATIVE
CHLORIDE SERPL-SCNC: 104 MMOL/L (ref 95–110)
CK SERPL-CCNC: 31 U/L (ref 20–180)
CLARITY UR: CLEAR
CO2 SERPL-SCNC: 23 MMOL/L (ref 23–29)
COLOR UR: YELLOW
CREAT SERPL-MCNC: 3 MG/DL (ref 0.5–1.4)
CREAT UR-MCNC: 68 MG/DL (ref 15–325)
DACRYOCYTES BLD QL SMEAR: ABNORMAL
DIFFERENTIAL METHOD: ABNORMAL
EOSINOPHIL # BLD AUTO: 0 K/UL (ref 0–0.5)
EOSINOPHIL NFR BLD: 0.1 % (ref 0–8)
ERYTHROCYTE [DISTWIDTH] IN BLOOD BY AUTOMATED COUNT: 34.2 % (ref 11.5–14.5)
EST. GFR  (NO RACE VARIABLE): 18 ML/MIN/1.73 M^2
GLUCOSE SERPL-MCNC: 200 MG/DL (ref 70–110)
GLUCOSE UR QL STRIP: ABNORMAL
HCT VFR BLD AUTO: 39.7 % (ref 37–48.5)
HGB BLD-MCNC: 11 G/DL (ref 12–16)
HGB UR QL STRIP: ABNORMAL
HYALINE CASTS #/AREA URNS LPF: 1 /LPF
HYPOCHROMIA BLD QL SMEAR: ABNORMAL
IMM GRANULOCYTES # BLD AUTO: 0.05 K/UL (ref 0–0.04)
IMM GRANULOCYTES NFR BLD AUTO: 0.7 % (ref 0–0.5)
INFLUENZA A, MOLECULAR: NEGATIVE
INFLUENZA B, MOLECULAR: NEGATIVE
KETONES UR QL STRIP: NEGATIVE
LACTATE SERPL-SCNC: 2.1 MMOL/L (ref 0.5–2.2)
LEUKOCYTE ESTERASE UR QL STRIP: NEGATIVE
LYMPHOCYTES # BLD AUTO: 0.9 K/UL (ref 1–4.8)
LYMPHOCYTES NFR BLD: 12.5 % (ref 18–48)
MCH RBC QN AUTO: 17.7 PG (ref 27–31)
MCHC RBC AUTO-ENTMCNC: 27.7 G/DL (ref 32–36)
MCV RBC AUTO: 64 FL (ref 82–98)
METHADONE UR QL SCN>300 NG/ML: NEGATIVE
MICROSCOPIC COMMENT: ABNORMAL
MONOCYTES # BLD AUTO: 0.4 K/UL (ref 0.3–1)
MONOCYTES NFR BLD: 6.3 % (ref 4–15)
NEUTROPHILS # BLD AUTO: 5.5 K/UL (ref 1.8–7.7)
NEUTROPHILS NFR BLD: 80.1 % (ref 38–73)
NITRITE UR QL STRIP: NEGATIVE
NRBC BLD-RTO: 17 /100 WBC
OPIATES UR QL SCN: NEGATIVE
PCP UR QL SCN>25 NG/ML: NEGATIVE
PH UR STRIP: 5 [PH] (ref 5–8)
PLATELET # BLD AUTO: 173 K/UL (ref 150–450)
PMV BLD AUTO: ABNORMAL FL (ref 9.2–12.9)
POIKILOCYTOSIS BLD QL SMEAR: ABNORMAL
POLYCHROMASIA BLD QL SMEAR: ABNORMAL
POTASSIUM SERPL-SCNC: 4.8 MMOL/L (ref 3.5–5.1)
PROT SERPL-MCNC: 6.6 G/DL (ref 6–8.4)
PROT UR QL STRIP: ABNORMAL
RBC # BLD AUTO: 6.23 M/UL (ref 4–5.4)
RBC #/AREA URNS HPF: 0 /HPF (ref 0–4)
SARS-COV-2 RDRP RESP QL NAA+PROBE: NEGATIVE
SCHISTOCYTES BLD QL SMEAR: PRESENT
SODIUM SERPL-SCNC: 140 MMOL/L (ref 136–145)
SP GR UR STRIP: 1.02 (ref 1–1.03)
SPECIMEN SOURCE: NORMAL
SPHEROCYTES BLD QL SMEAR: ABNORMAL
STOMATOCYTES BLD QL SMEAR: PRESENT
TARGETS BLD QL SMEAR: ABNORMAL
TOXICOLOGY INFORMATION: NORMAL
TROPONIN I SERPL DL<=0.01 NG/ML-MCNC: 0.85 NG/ML (ref 0–0.03)
TROPONIN I SERPL DL<=0.01 NG/ML-MCNC: 0.9 NG/ML (ref 0–0.03)
URATE SERPL-MCNC: 13.6 MG/DL (ref 2.4–5.7)
URN SPEC COLLECT METH UR: ABNORMAL
UROBILINOGEN UR STRIP-ACNC: NEGATIVE EU/DL
WBC # BLD AUTO: 6.88 K/UL (ref 3.9–12.7)
WBC #/AREA URNS HPF: 5 /HPF (ref 0–5)

## 2023-04-26 PROCEDURE — 96374 THER/PROPH/DIAG INJ IV PUSH: CPT

## 2023-04-26 PROCEDURE — 93010 EKG 12-LEAD: ICD-10-PCS | Mod: ,,, | Performed by: INTERNAL MEDICINE

## 2023-04-26 PROCEDURE — 80053 COMPREHEN METABOLIC PANEL: CPT | Performed by: SURGERY

## 2023-04-26 PROCEDURE — 81000 URINALYSIS NONAUTO W/SCOPE: CPT | Mod: 59 | Performed by: SURGERY

## 2023-04-26 PROCEDURE — 96361 HYDRATE IV INFUSION ADD-ON: CPT

## 2023-04-26 PROCEDURE — 93010 ELECTROCARDIOGRAM REPORT: CPT | Mod: ,,, | Performed by: INTERNAL MEDICINE

## 2023-04-26 PROCEDURE — 99291 CRITICAL CARE FIRST HOUR: CPT | Mod: 25

## 2023-04-26 PROCEDURE — 84484 ASSAY OF TROPONIN QUANT: CPT | Mod: 91 | Performed by: SURGERY

## 2023-04-26 PROCEDURE — 87040 BLOOD CULTURE FOR BACTERIA: CPT | Performed by: SURGERY

## 2023-04-26 PROCEDURE — 25000003 PHARM REV CODE 250: Performed by: SURGERY

## 2023-04-26 PROCEDURE — 93296 REM INTERROG EVL PM/IDS: CPT | Performed by: INTERNAL MEDICINE

## 2023-04-26 PROCEDURE — 87502 INFLUENZA DNA AMP PROBE: CPT | Performed by: SURGERY

## 2023-04-26 PROCEDURE — 27000221 HC OXYGEN, UP TO 24 HOURS

## 2023-04-26 PROCEDURE — 85025 COMPLETE CBC W/AUTO DIFF WBC: CPT | Performed by: SURGERY

## 2023-04-26 PROCEDURE — 82550 ASSAY OF CK (CPK): CPT | Performed by: SURGERY

## 2023-04-26 PROCEDURE — 96375 TX/PRO/DX INJ NEW DRUG ADDON: CPT

## 2023-04-26 PROCEDURE — U0002 COVID-19 LAB TEST NON-CDC: HCPCS | Performed by: SURGERY

## 2023-04-26 PROCEDURE — 84550 ASSAY OF BLOOD/URIC ACID: CPT | Performed by: SURGERY

## 2023-04-26 PROCEDURE — 83605 ASSAY OF LACTIC ACID: CPT | Performed by: SURGERY

## 2023-04-26 PROCEDURE — 80307 DRUG TEST PRSMV CHEM ANLYZR: CPT | Performed by: SURGERY

## 2023-04-26 PROCEDURE — 83880 ASSAY OF NATRIURETIC PEPTIDE: CPT | Performed by: SURGERY

## 2023-04-26 PROCEDURE — 36415 COLL VENOUS BLD VENIPUNCTURE: CPT | Performed by: SURGERY

## 2023-04-26 PROCEDURE — 94760 N-INVAS EAR/PLS OXIMETRY 1: CPT

## 2023-04-26 PROCEDURE — 93005 ELECTROCARDIOGRAM TRACING: CPT

## 2023-04-26 PROCEDURE — 63600175 PHARM REV CODE 636 W HCPCS: Performed by: SURGERY

## 2023-04-26 RX ORDER — PANTOPRAZOLE SODIUM 40 MG/1
40 TABLET, DELAYED RELEASE ORAL
Status: COMPLETED | OUTPATIENT
Start: 2023-04-26 | End: 2023-04-26

## 2023-04-26 RX ORDER — FUROSEMIDE 10 MG/ML
40 INJECTION INTRAMUSCULAR; INTRAVENOUS
Status: COMPLETED | OUTPATIENT
Start: 2023-04-26 | End: 2023-04-26

## 2023-04-26 RX ORDER — SODIUM CHLORIDE 9 MG/ML
1000 INJECTION, SOLUTION INTRAVENOUS
Status: COMPLETED | OUTPATIENT
Start: 2023-04-26 | End: 2023-04-26

## 2023-04-26 RX ORDER — ONDANSETRON 2 MG/ML
4 INJECTION INTRAMUSCULAR; INTRAVENOUS
Status: COMPLETED | OUTPATIENT
Start: 2023-04-26 | End: 2023-04-26

## 2023-04-26 RX ORDER — IPRATROPIUM BROMIDE AND ALBUTEROL SULFATE 2.5; .5 MG/3ML; MG/3ML
3 SOLUTION RESPIRATORY (INHALATION) EVERY 4 HOURS
Status: DISCONTINUED | OUTPATIENT
Start: 2023-04-27 | End: 2023-04-27 | Stop reason: HOSPADM

## 2023-04-26 RX ORDER — ATORVASTATIN CALCIUM 40 MG/1
40 TABLET, FILM COATED ORAL
Status: COMPLETED | OUTPATIENT
Start: 2023-04-26 | End: 2023-04-26

## 2023-04-26 RX ORDER — ASPIRIN 325 MG
325 TABLET ORAL
Status: COMPLETED | OUTPATIENT
Start: 2023-04-26 | End: 2023-04-26

## 2023-04-26 RX ADMIN — FUROSEMIDE 40 MG: 10 INJECTION, SOLUTION INTRAMUSCULAR; INTRAVENOUS at 02:04

## 2023-04-26 RX ADMIN — PANTOPRAZOLE SODIUM 40 MG: 40 TABLET, DELAYED RELEASE ORAL at 02:04

## 2023-04-26 RX ADMIN — IPRATROPIUM BROMIDE AND ALBUTEROL SULFATE 3 ML: .5; 3 SOLUTION RESPIRATORY (INHALATION) at 11:04

## 2023-04-26 RX ADMIN — NITROGLYCERIN 0.5 INCH: 20 OINTMENT TOPICAL at 02:04

## 2023-04-26 RX ADMIN — SODIUM CHLORIDE 1000 ML: 9 INJECTION, SOLUTION INTRAVENOUS at 02:04

## 2023-04-26 RX ADMIN — ASPIRIN 325 MG ORAL TABLET 325 MG: 325 PILL ORAL at 02:04

## 2023-04-26 RX ADMIN — ONDANSETRON HYDROCHLORIDE 4 MG: 2 SOLUTION INTRAMUSCULAR; INTRAVENOUS at 02:04

## 2023-04-26 RX ADMIN — ATORVASTATIN CALCIUM 40 MG: 40 TABLET, FILM COATED ORAL at 02:04

## 2023-04-26 NOTE — ED NOTES
"Rhythm change noted on continuous cardiac monitor. Patient reports feeling "palpitations that woke me up from sleeping". AAOx4 at this time. Rhythm strip printed and given to MD to review.   Transfer center notified.   "

## 2023-04-26 NOTE — PROVIDER TRANSFER
Outside Transfer Acceptance Note / Regional Referral Center    Referring facility: [facility]   Referring provider: HECTOR ESPITIA, SUZANNE VELASCO, CATHY DACOSTA  Accepting facility: [facility]  Accepting provider: JOCELYNE HORTA SELIM R. WEVER PINZON, JAMES R.  Admitting provider: Antonino Chisholm MD  Reason for transfer:  HTS consultation  Transfer diagnosis: ADHF and pulmonary hypertension  Transfer specialty requested: Trauma Surgery  Cardiology  Transplant Heart  Transplant Heart  Transfer specialty notified: Yes  Transfer level: NUMBER 1-5: 2  Bed type requested: tele  Isolation status: No active isolations   Admission class or status: IP- Inpatient  IP- Inpatient  IP- Inpatient  IP- Inpatient      Narrative       Ms. Hafsa Hawley is a 57 year old woman with PMH of HTN, COPD, CKD, NICM s/p ICD, HFrEF (EF 15%), permanent AF( on eliquis), Gilbert's syndrome, pulmonary hypertension, PSVT, MELISSA (nonadherent to CPAP), and GERD who presented to [place] [facility] on 4/26/23 with chief complaint of weakness and SOB. Associated symptoms include vomiting, fatigue, chest discomfort, and HA. Family reports decreased urine output as well. Patient reports that she has had difficulty ambulating and performing ADLS for the past 2 days. Patient states she could not get up to the bathroom without experiencing shortness of breath. The day prior arrival, she experienced chest pressure that worsened the following morning.     Her primary cardiologist is Dr. Benson Cortez primary HTS specialist is Dr. Orozco. Home medications: metoprolol succinate 50 gm daily, medium dose entresto, jardiance. Not on MRA due to renal funciton. Volume mgmt with Bumex 1 mg BID. Per cardiology note on  4/17/23, patient was presented to OHT committee on 3/29/23 for a second time and was declined for Heart Transplant listing due to poor medical adherence, declined pulmonary function and declined renal function.     In the ED, vital signs showed BP 140s/90s, HR , RR 16-20 and O2 sat 92-95% on RA. Labwork significant for WBC 6.88, Hb 11, Na 14, K 4.8, BUN 81, Cr 3.0 (baseline Cr 2.1-2.5), CO2 18, uric acid 13.6, Bili 3.2, AST/ALT wnl, BNP > 4,900, CPK 31, troponin 0.900, lactate 2.1. EKG NSR. Flu and COVID negative. CXR showed vascular congestion and mild edema. HCT was nonacute. Blood cultures were collected and patient was given 1 L NS bolus, ASA, statin, and nitro paste. She was also given protonix and IV zofran. She was later given 40 mg IV lasix.     Dr. Adriano Low ([place] [facility]) contacted [facility] to request patient transfer to higher level of care for cardiology and HTS consultation for evaluation and management of ADHF and pulmonary hypertension. [facility] notified Dr. Peck ([facility]) and the case was discussed. Dr. Peck agreed to consult on the patient with admission to Hospital Medicine. Patient will require tele bed.     Objective     Vitals: Temp: 98.7 °F (37.1 °C) (04/26/23 1313)  Pulse: 100 (04/26/23 1517)  Resp: 19 (04/26/23 1517)  BP: (!) 149/99 (04/26/23 1517)  SpO2: (!) 92 % (04/26/23 1517)  Recent Labs: CBC:   Recent Labs   Lab 04/26/23  1342   WBC 6.88   HGB 11.0*   HCT 39.7        CMP:   Recent Labs   Lab 04/26/23  1342      K 4.8      CO2 23   *   BUN 81*   CREATININE 3.0*   CALCIUM 9.7   PROT 6.6   ALBUMIN 3.9   BILITOT 3.2*   ALKPHOS 63   AST 24   ALT 39   ANIONGAP 13     Coagulation: No results for input(s): PT, INR, APTT in the last 48 hours.  Lactic Acid:   Recent Labs   Lab 04/26/23  1341   LACTATE 2.1     Magnesium: No results for input(s): MG in the last 48 hours.  POCT Glucose: No results for input(s): POCTGLUCOSE in the last 48 hours.  Troponin:   Recent  Labs   Lab 04/26/23  1341   TROPONINI 0.900*     TSH:   Recent Labs   Lab 01/04/23  0904   TSH 1.407       Recent imaging:   Imaging Results              CT Head Without Contrast (Final result)  Result time 04/26/23 13:44:17      Final result by Debora Bone MD (04/26/23 13:44:17)                   Impression:      No acute intracranial findings.    Age-appropriate cerebral volume loss with mild patchy decreased attenuation supratentorial white matter while nonspecific suggestive for chronic ischemic change.    No evidence for acute intracranial hemorrhage.  Clinical correlation and further evaluation as warranted.      Electronically signed by: Debora Bone MD  Date:    04/26/2023  Time:    13:44               Narrative:    EXAMINATION:  CT HEAD WITHOUT CONTRAST    CLINICAL HISTORY:  Headache, chronic, new features or increased frequency;    TECHNIQUE:  Multiple sequential 5 mm axial images of the head without contrast.  Coronal and sagittal reformatted imaging from the axial acquisition.    COMPARISON:  None    FINDINGS:  There is mild age-appropriate generalized cerebral volume loss.  Compensatory enlargement of the ventricle sulci and cisterns without hydrocephalus.  There is no midline shift or mass effect.  There is mild ill-defined decreased attenuation in the supratentorial white matter while nonspecific suggestive for chronic ischemic change.  There is no evidence for acute intracranial hemorrhage or sulcal effacement.  There is no midline shift or mass effect.  Prominent vascular calcifications.  Visualized paranasal sinuses and mastoid air cells are clear..                                       X-Ray Chest 1 View (Final result)  Result time 04/26/23 13:36:54      Final result by Debora Bone MD (04/26/23 13:36:54)                   Impression:      As above.      Electronically signed by: Debora Bone MD  Date:    04/26/2023  Time:    13:36               Narrative:    EXAMINATION:  XR CHEST  "1 VIEW    CLINICAL HISTORY:  COVID;    TECHNIQUE:  Single frontal view of the chest was performed.    COMPARISON:  03/26/2023    FINDINGS:  Heart is enlarged.  Central pulmonary vascular congestion with mild edema.  Left-sided pacemaker is in place.  Skeletal structures are intact.                                       Airway:   RA  Vent settings:  N/A       IV access:  PIV  Infusions: None  Allergies:   Review of patient's allergies indicates:   Allergen Reactions    Penicillins Hives and Other (See Comments)    Iodinated contrast media Nausea And Vomiting    Oxycodone-acetaminophen Other (See Comments)     Nausea, Dizziness, Anxiety.  "I don't like how it makes me feel."   Given Hydromorphone 0.5mg IVP  Without problems.  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 Metabolism of other drugs not listed here may also be impacted by UGT1A1 enzyme activity.       Tramadol Nausea And Vomiting and Other (See Comments)     Other reaction(s): Other (See Comments)    Valsartan Other (See Comments)      NPO: No    Anticoagulation:   Anticoagulants       None             Instructions      Arie Vazquez-  Admit to Hospital Medicine  Upon patient arrival to floor, please send SecureChat to [facility] or call extension [phone] (if no answer, do NOT leave a callback number after the beep, rather please send a SecureChat to [facility]), for Hospital Medicine admit team assignment and for " additional admit orders for the patient.  Do not page the attending physician associated with the patient on arrival (this physician may not be on duty at the time of arrival).  Rather, always send a SecureChat to [facility] or call [phone] to reach the triage physician for orders and team assignment.

## 2023-04-26 NOTE — ED PROVIDER NOTES
"Encounter Date: 4/26/2023       History     Chief Complaint   Patient presents with    Weakness     Patient to ER CC of vomiting and weakness for a week, also reports a headache, right dental pain for a while as well      Hafsa Hawley is a 57 y.o. female that presents with weakness & vomiting  Patient with chest pressure, weakness, vomiting, shortness of breath this wk  Patient today is slightly hypotensive, states she feels very weak on arrival  I find that the pt is diminished in both lung bases, has not been feeling well  Patient has been dealing with gout issues, family states no urine production  Patient states she can not get up to the bathroom without shortness of breath  Chest pressure started yesterday, escalating this morning with a headache    Review of patient's allergies indicates:   Allergen Reactions    Penicillins Hives and Other (See Comments)    Iodinated contrast media Nausea And Vomiting    Oxycodone-acetaminophen Other (See Comments)     Nausea, Dizziness, Anxiety.  "I don't like how it makes me feel."   Given Hydromorphone 0.5mg IVP  Without problems.  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 Metabolism of other drugs not listed here may also be impacted by UGT1A1 enzyme activity.       Tramadol Nausea And Vomiting and Other (See Comments)     Other reaction(s): Other (See Comments)    Valsartan Other (See Comments)     Past Medical History: "   Diagnosis Date    Anemia     Arthritis     Atrial fibrillation     OAC    Chronic respiratory failure with hypoxia, on home oxygen therapy     2L with activity, off at rest.  Per Pulm  no overt evidence of ILD or COPD on PFTs and CT to explain O2 needs.    CKD (chronic kidney disease), stage IV 05/08/2018    Congestive heart failure     s/p AICD placement,    Deep vein thrombosis     Depression     elevated bilirubin d/t Gilbert's syndrome     confirmed by [place] genetic testing, evaluated by hepatology    Encounter for blood transfusion     GERD (gastroesophageal reflux disease)     Hypertension     Pheochromocytoma, malignant     Right kidney mass     Sleep apnea     Thalassemia trait, alpha     Thyroid disease     Type 2 diabetes mellitus with hyperglycemia, without long-term current use of insulin 08/13/2020     Past Surgical History:   Procedure Laterality Date    APPENDECTOMY      BONE MARROW BIOPSY      CARDIAC DEFIBRILLATOR PLACEMENT Left 12/2016    CARDIAC ELECTROPHYSIOLOGY MAPPING AND ABLATION      CARDIAC ELECTROPHYSIOLOGY MAPPING AND ABLATION      COLONOSCOPY N/A 5/6/2022    Procedure: COLONOSCOPY;  Surgeon: Arely Betancourt MD;  [facility] ([address] [place] [address]);  Service: Endoscopy;  Laterality: N/A;  heart transplant candidate/ EF 25% - 2nd floor/ defib - Biotronik - ERW  Eliquis - per Dr. Cortez with [facility], Pt ok to hold Eliquis x 2 days prior-see media tab-outside correspondence dated 12/30/21  - ERW  verbal instructions/portal instructions/email instructions - s    EYE SURGERY      due to running tears    FRACTURE SURGERY Left     hand 5th digit    HYSTERECTOMY      KNEE SURGERY Left 2016    hematoma    LIVER BIOPSY  10/24/2018    Minimal steatosis, predominantly macrovesicular, 1%, Minimal nonspecific portal inflammation, no fibrosis. No findings on biopsy to explain elevated bilirubin levels. Could be d/t Gilbert's =?- hemolysis    RIGHT HEART CATHETERIZATION Right 12/07/2021    Procedure:  INSERTION, CATHETER, RIGHT HEART;  Surgeon: Irving Cardenas MD;  Location: [facility] CATH LAB;  Service: Cardiology;  Laterality: Right;    RIGHT HEART CATHETERIZATION Right 2022    Procedure: INSERTION, CATHETER, RIGHT HEART;  Surgeon: Burke Camilo MD;  Location: [facility] CATH LAB;  Service: Cardiology;  Laterality: Right;    RIGHT HEART CATHETERIZATION Right 3/29/2023    Procedure: INSERTION, CATHETER, RIGHT HEART;  Surgeon: Katie Liriano DO;  Location: [facility] CATH LAB;  Service: Cardiology;  Laterality: Right;    TRANSJUGULAR BIOPSY OF LIVER N/A 10/24/2018    Procedure: BIOPSY, LIVER, TRANSJUGULAR APPROACH;  Surgeon: Carmen Diagnostic Provider;  Location: [facility] OR [address] [place] [address];  Service: Radiology;  Laterality: N/A;     Family History   Problem Relation Age of Onset    Cancer Mother         pancreatic CA early 50's    Heart disease Father          MI in late 50's    Hypertension Father     Heart attack Father     Heart disease Sister     Heart disease Brother     Cirrhosis Brother         alcoholic    Heart disease Sister     Heart disease Brother     Hypertension Brother     Diabetes Brother      Social History     Tobacco Use    Smoking status: Never    Smokeless tobacco: Never   Substance Use Topics    Alcohol use: Yes     Comment: up to 1 yr ago drank 2-3 drinks on occasion but sporadic    Drug use: No     Review of Systems   Constitutional:  Positive for fatigue.   HENT: Negative.     Eyes: Negative.    Respiratory: Negative.     Cardiovascular:  Positive for chest pain.   Gastrointestinal:  Positive for nausea.   Genitourinary: Negative.    Musculoskeletal: Negative.    Skin: Negative.    Neurological:  Positive for weakness and headaches.   Psychiatric/Behavioral: Negative.       Physical Exam     Initial Vitals   BP Pulse Resp Temp SpO2   23 1312 23 1312 23 1312 23 1313 23 1312   (!) 146/101 95 18 98.7 °F (37.1 °C) 95 %      MAP       --                Physical  Exam    Nursing note and vitals reviewed.  Constitutional: Vital signs are normal. She appears well-developed and well-nourished. She is cooperative.   HENT:   Head: Normocephalic and atraumatic.   Right Ear: External ear normal.   Left Ear: External ear normal.   Nose: Nose normal.   Mouth/Throat: Oropharynx is clear and moist.   Eyes: Conjunctivae, EOM and lids are normal. Pupils are equal, round, and reactive to light.   Neck: Trachea normal and phonation normal. Neck supple. No JVD present.   Normal range of motion.   Full passive range of motion without pain.     Cardiovascular:  Normal rate, regular rhythm, S1 normal, S2 normal, normal heart sounds, intact distal pulses and normal pulses.           Pulmonary/Chest: Effort normal.   (+) crackles in the bilateral lower extremities   Abdominal: Abdomen is soft and flat. Bowel sounds are normal.   Musculoskeletal:         General: Normal range of motion.      Cervical back: Full passive range of motion without pain, normal range of motion and neck supple.     Neurological: She is alert and oriented to person, place, and time. She has normal strength.   Skin: Skin is warm, dry and intact. Capillary refill takes less than 2 seconds.       ED Course   Procedures  Labs Reviewed   CBC W/ AUTO DIFFERENTIAL - Abnormal; Notable for the following components:       Result Value    RBC 6.23 (*)     Hemoglobin 11.0 (*)     MCV 64 (*)     MCH 17.7 (*)     MCHC 27.7 (*)     RDW 34.2 (*)     Immature Granulocytes 0.7 (*)     Immature Grans (Abs) 0.05 (*)     Lymph # 0.9 (*)     nRBC 17 (*)     Gran % 80.1 (*)     Lymph % 12.5 (*)     All other components within normal limits   COMPREHENSIVE METABOLIC PANEL - Abnormal; Notable for the following components:    Glucose 200 (*)     BUN 81 (*)     Creatinine 3.0 (*)     Total Bilirubin 3.2 (*)     eGFR 18 (*)     All other components within normal limits   TROPONIN I - Abnormal; Notable for the following components:    Troponin I  0.900 (*)     All other components within normal limits   B-TYPE NATRIURETIC PEPTIDE - Abnormal; Notable for the following components:    BNP >4,900 (*)     All other components within normal limits   URIC ACID - Abnormal; Notable for the following components:    Uric Acid 13.6 (*)     All other components within normal limits   INFLUENZA A & B BY MOLECULAR   CULTURE, BLOOD   CULTURE, BLOOD   LACTIC ACID, PLASMA   SARS-COV-2 RNA AMPLIFICATION, QUAL   CK   DRUG SCREEN PANEL, URINE EMERGENCY   URINALYSIS, REFLEX TO URINE CULTURE     EKG Readings: (Independently Interpreted)   No STEMI  Normal sinus rhythm  No ectopy  Normal conduction  Normal ST segments  Normal T-wave  Normal axis  Heart rate in the 100s     Imaging Results              CT Head Without Contrast (Final result)  Result time 04/26/23 13:44:17      Final result by Debora Bone MD (04/26/23 13:44:17)                   Impression:      No acute intracranial findings.    Age-appropriate cerebral volume loss with mild patchy decreased attenuation supratentorial white matter while nonspecific suggestive for chronic ischemic change.    No evidence for acute intracranial hemorrhage.  Clinical correlation and further evaluation as warranted.      Electronically signed by: Debora Bone MD  Date:    04/26/2023  Time:    13:44               Narrative:    EXAMINATION:  CT HEAD WITHOUT CONTRAST    CLINICAL HISTORY:  Headache, chronic, new features or increased frequency;    TECHNIQUE:  Multiple sequential 5 mm axial images of the head without contrast.  Coronal and sagittal reformatted imaging from the axial acquisition.    COMPARISON:  None    FINDINGS:  There is mild age-appropriate generalized cerebral volume loss.  Compensatory enlargement of the ventricle sulci and cisterns without hydrocephalus.  There is no midline shift or mass effect.  There is mild ill-defined decreased attenuation in the supratentorial white matter while nonspecific suggestive for  chronic ischemic change.  There is no evidence for acute intracranial hemorrhage or sulcal effacement.  There is no midline shift or mass effect.  Prominent vascular calcifications.  Visualized paranasal sinuses and mastoid air cells are clear..                                       X-Ray Chest 1 View (Final result)  Result time 23 13:36:54      Final result by Debora Bone MD (23 13:36:54)                   Impression:      As above.      Electronically signed by: Debora Bone MD  Date:    2023  Time:    13:36               Narrative:    EXAMINATION:  XR CHEST 1 VIEW    CLINICAL HISTORY:  COVID;    TECHNIQUE:  Single frontal view of the chest was performed.    COMPARISON:  2023    FINDINGS:  Heart is enlarged.  Central pulmonary vascular congestion with mild edema.  Left-sided pacemaker is in place.  Skeletal structures are intact.                                       Medications   ondansetron injection 4 mg (4 mg Intravenous Given 23 1425)   aspirin tablet 325 mg (325 mg Oral Given 23 1427)   0.9%  NaCl infusion (0 mLs Intravenous Stopped 23 1504)   furosemide injection 40 mg (40 mg Intravenous Given 23 1455)   nitroGLYCERIN 2% TD oint ointment 0.5 inch (0.5 inches Topical (Top) Given 23 1455)   pantoprazole EC tablet 40 mg (40 mg Oral Given 23 1455)   atorvastatin tablet 40 mg (40 mg Oral Given 23 1455)     Medical Decision Makin-year-old male presents with headache, nausea, fatigue, chest pressure & SOB  Patient states she can not even get up to ambulate or do any ADLs this past 2 days  Severe congestive heart failure patient, renal insufficiency, chest pain earlier today  Elevated BNP over 5000, troponin elevation of 0.900, aspirin given the ER today  Nitro paste given in the ER today, IV Lasix given in the ER today, feeling better   IV Zofran, patient needs evaluation & admission for cardiology evaluation today  Patient is a high-risk  transplant Cardiology patient for severe pulmonary HTN  Will reach out to [facility] the specialist service for further evaluation ASAP    Critical Care ED Physician Time (minutes):  -- Performed by: Adriano Low M.D.  -- Date/Time: 3:31 PM 4/26/2023   -- Direct Patient Care (Face Time): 5  -- Additional History from Records or Taking Additional History: 5  -- Ordering, Reviewing, and Interpreting Diagnostic Studies: 5  -- Total Time in Documentation: 11  -- Consultation with Other Physicians: 5  -- Consultation with Family Related to Condition: 0  -- Total Critical Care Time: 31  -- Critical care was necessary to treat pul HTN, NSTEMI, CHF, demand ischemia  -- Critical care was time spent personally by me on the following activities:   -- blood draw for specimens discussions with consultants,   -- development of treatment plan with patient or surrogate,   -- examination of patient, ordering and performing treatments   -- review of radiographic studies, re-evaluation of pt's condition  -- review of labs and evaluation of response to treatment                           Clinical Impression:   Final diagnoses:  [R07.9] Chest pain, unspecified type  [I21.4] NSTEMI (non-ST elevated myocardial infarction) (Primary)  [I50.9] Congestive heart failure, unspecified HF chronicity, unspecified heart failure type  [I27.20] Pulmonary HTN (Chronic)        ED Disposition Condition    Transfer to Another Facility Stable                Adriano Low MD  04/26/23 [id number]

## 2023-04-27 ENCOUNTER — HOSPITAL ENCOUNTER (INPATIENT)
Facility: HOSPITAL | Age: 58
LOS: 5 days | Discharge: HOME | DRG: 291 | End: 2023-05-02
Attending: INTERNAL MEDICINE | Admitting: INTERNAL MEDICINE
Payer: MEDICAID

## 2023-04-27 VITALS
TEMPERATURE: 99 F | BODY MASS INDEX: 31.66 KG/M2 | SYSTOLIC BLOOD PRESSURE: 135 MMHG | RESPIRATION RATE: 15 BRPM | HEART RATE: 90 BPM | OXYGEN SATURATION: 94 % | HEIGHT: 66 IN | WEIGHT: 197 LBS | DIASTOLIC BLOOD PRESSURE: 88 MMHG

## 2023-04-27 DIAGNOSIS — I50.43 ACUTE ON CHRONIC COMBINED SYSTOLIC AND DIASTOLIC HEART FAILURE: ICD-10-CM

## 2023-04-27 DIAGNOSIS — I50.9 ACUTE CHF: ICD-10-CM

## 2023-04-27 DIAGNOSIS — I50.9 CHF (CONGESTIVE HEART FAILURE): ICD-10-CM

## 2023-04-27 DIAGNOSIS — R53.81 PHYSICAL DEBILITY: Primary | ICD-10-CM

## 2023-04-27 DIAGNOSIS — I50.43 ACUTE ON CHRONIC COMBINED SYSTOLIC AND DIASTOLIC HRT FAIL: ICD-10-CM

## 2023-04-27 PROBLEM — R82.71 ASYMPTOMATIC BACTERIURIA: Status: ACTIVE | Noted: 2023-04-27

## 2023-04-27 PROBLEM — J44.1 COPD EXACERBATION: Status: ACTIVE | Noted: 2023-04-27

## 2023-04-27 LAB
ALBUMIN SERPL BCP-MCNC: 3.3 G/DL (ref 3.5–5.2)
ALP SERPL-CCNC: 60 U/L (ref 55–135)
ALT SERPL W/O P-5'-P-CCNC: 29 U/L (ref 10–44)
ANION GAP SERPL CALC-SCNC: 12 MMOL/L (ref 8–16)
ANION GAP SERPL CALC-SCNC: 14 MMOL/L (ref 8–16)
ANISOCYTOSIS BLD QL SMEAR: ABNORMAL
AST SERPL-CCNC: 22 U/L (ref 10–40)
BACTERIA #/AREA URNS AUTO: NORMAL /HPF
BASOPHILS # BLD AUTO: 0.03 K/UL (ref 0–0.2)
BASOPHILS NFR BLD: 0.5 % (ref 0–1.9)
BILIRUB SERPL-MCNC: 3.3 MG/DL (ref 0.1–1)
BILIRUB UR QL STRIP: NEGATIVE
BUN SERPL-MCNC: 78 MG/DL (ref 6–20)
BUN SERPL-MCNC: 79 MG/DL (ref 6–20)
CALCIUM SERPL-MCNC: 9.2 MG/DL (ref 8.7–10.5)
CALCIUM SERPL-MCNC: 9.3 MG/DL (ref 8.7–10.5)
CHLORIDE SERPL-SCNC: 104 MMOL/L (ref 95–110)
CHLORIDE SERPL-SCNC: 104 MMOL/L (ref 95–110)
CLARITY UR REFRACT.AUTO: CLEAR
CO2 SERPL-SCNC: 21 MMOL/L (ref 23–29)
CO2 SERPL-SCNC: 23 MMOL/L (ref 23–29)
COLOR UR AUTO: YELLOW
CREAT SERPL-MCNC: 2.5 MG/DL (ref 0.5–1.4)
CREAT SERPL-MCNC: 2.6 MG/DL (ref 0.5–1.4)
DACRYOCYTES BLD QL SMEAR: ABNORMAL
DIFFERENTIAL METHOD: ABNORMAL
EOSINOPHIL # BLD AUTO: 0 K/UL (ref 0–0.5)
EOSINOPHIL NFR BLD: 0.5 % (ref 0–8)
ERYTHROCYTE [DISTWIDTH] IN BLOOD BY AUTOMATED COUNT: 34.2 % (ref 11.5–14.5)
EST. GFR  (NO RACE VARIABLE): 20.9 ML/MIN/1.73 M^2
EST. GFR  (NO RACE VARIABLE): 21.9 ML/MIN/1.73 M^2
FERRITIN SERPL-MCNC: 108 NG/ML (ref 20–300)
GLUCOSE SERPL-MCNC: 144 MG/DL (ref 70–110)
GLUCOSE SERPL-MCNC: 149 MG/DL (ref 70–110)
GLUCOSE UR QL STRIP: NEGATIVE
HCT VFR BLD AUTO: 37 % (ref 37–48.5)
HGB BLD-MCNC: 10.4 G/DL (ref 12–16)
HGB UR QL STRIP: ABNORMAL
HYALINE CASTS UR QL AUTO: 1 /LPF
HYPOCHROMIA BLD QL SMEAR: ABNORMAL
IMM GRANULOCYTES # BLD AUTO: 0.05 K/UL (ref 0–0.04)
IMM GRANULOCYTES NFR BLD AUTO: 0.9 % (ref 0–0.5)
INR PPP: 1.1 (ref 0.8–1.2)
IRON SERPL-MCNC: 95 UG/DL (ref 30–160)
KETONES UR QL STRIP: NEGATIVE
LEUKOCYTE ESTERASE UR QL STRIP: NEGATIVE
LYMPHOCYTES # BLD AUTO: 1 K/UL (ref 1–4.8)
LYMPHOCYTES NFR BLD: 17.9 % (ref 18–48)
MAGNESIUM SERPL-MCNC: 2.2 MG/DL (ref 1.6–2.6)
MCH RBC QN AUTO: 18 PG (ref 27–31)
MCHC RBC AUTO-ENTMCNC: 28.1 G/DL (ref 32–36)
MCV RBC AUTO: 64 FL (ref 82–98)
MICROSCOPIC COMMENT: NORMAL
MONOCYTES # BLD AUTO: 0.5 K/UL (ref 0.3–1)
MONOCYTES NFR BLD: 8.8 % (ref 4–15)
NEUTROPHILS # BLD AUTO: 4.1 K/UL (ref 1.8–7.7)
NEUTROPHILS NFR BLD: 71.4 % (ref 38–73)
NITRITE UR QL STRIP: NEGATIVE
NRBC BLD-RTO: 20 /100 WBC
PH UR STRIP: 5 [PH] (ref 5–8)
PHOSPHATE SERPL-MCNC: 5.2 MG/DL (ref 2.7–4.5)
PLATELET # BLD AUTO: 157 K/UL (ref 150–450)
PLATELET BLD QL SMEAR: ABNORMAL
PMV BLD AUTO: ABNORMAL FL (ref 9.2–12.9)
POIKILOCYTOSIS BLD QL SMEAR: ABNORMAL
POLYCHROMASIA BLD QL SMEAR: ABNORMAL
POTASSIUM SERPL-SCNC: 4.5 MMOL/L (ref 3.5–5.1)
POTASSIUM SERPL-SCNC: 5 MMOL/L (ref 3.5–5.1)
PROT SERPL-MCNC: 5.7 G/DL (ref 6–8.4)
PROT UR QL STRIP: ABNORMAL
PROTHROMBIN TIME: 11.4 SEC (ref 9–12.5)
RBC # BLD AUTO: 5.79 M/UL (ref 4–5.4)
RBC #/AREA URNS AUTO: 1 /HPF (ref 0–4)
SATURATED IRON: 37 % (ref 20–50)
SCHISTOCYTES BLD QL SMEAR: ABNORMAL
SCHISTOCYTES BLD QL SMEAR: PRESENT
SODIUM SERPL-SCNC: 139 MMOL/L (ref 136–145)
SODIUM SERPL-SCNC: 139 MMOL/L (ref 136–145)
SP GR UR STRIP: 1.01 (ref 1–1.03)
SPHEROCYTES BLD QL SMEAR: ABNORMAL
SQUAMOUS #/AREA URNS AUTO: 0 /HPF
TARGETS BLD QL SMEAR: ABNORMAL
TOTAL IRON BINDING CAPACITY: 258 UG/DL (ref 250–450)
TRANSFERRIN SERPL-MCNC: 174 MG/DL (ref 200–375)
TROPONIN I SERPL DL<=0.01 NG/ML-MCNC: 0.81 NG/ML (ref 0–0.03)
URN SPEC COLLECT METH UR: ABNORMAL
WBC # BLD AUTO: 5.8 K/UL (ref 3.9–12.7)
WBC #/AREA URNS AUTO: 0 /HPF (ref 0–5)

## 2023-04-27 PROCEDURE — 93010 EKG 12-LEAD: ICD-10-PCS | Mod: ,,, | Performed by: INTERNAL MEDICINE

## 2023-04-27 PROCEDURE — 63600175 PHARM REV CODE 636 W HCPCS: Performed by: STUDENT IN AN ORGANIZED HEALTH CARE EDUCATION/TRAINING PROGRAM

## 2023-04-27 PROCEDURE — 63600175 PHARM REV CODE 636 W HCPCS: Performed by: HOSPITALIST

## 2023-04-27 PROCEDURE — 80053 COMPREHEN METABOLIC PANEL: CPT | Performed by: STUDENT IN AN ORGANIZED HEALTH CARE EDUCATION/TRAINING PROGRAM

## 2023-04-27 PROCEDURE — 25000003 PHARM REV CODE 250: Performed by: HOSPITALIST

## 2023-04-27 PROCEDURE — 80048 BASIC METABOLIC PNL TOTAL CA: CPT | Mod: XB | Performed by: STUDENT IN AN ORGANIZED HEALTH CARE EDUCATION/TRAINING PROGRAM

## 2023-04-27 PROCEDURE — 84466 ASSAY OF TRANSFERRIN: CPT | Performed by: STUDENT IN AN ORGANIZED HEALTH CARE EDUCATION/TRAINING PROGRAM

## 2023-04-27 PROCEDURE — 99223 PR INITIAL HOSPITAL CARE,LEVL III: ICD-10-PCS | Mod: 95,,, | Performed by: STUDENT IN AN ORGANIZED HEALTH CARE EDUCATION/TRAINING PROGRAM

## 2023-04-27 PROCEDURE — 20600001 HC STEP DOWN PRIVATE ROOM

## 2023-04-27 PROCEDURE — 93010 ELECTROCARDIOGRAM REPORT: CPT | Mod: ,,, | Performed by: INTERNAL MEDICINE

## 2023-04-27 PROCEDURE — 25000003 PHARM REV CODE 250: Performed by: STUDENT IN AN ORGANIZED HEALTH CARE EDUCATION/TRAINING PROGRAM

## 2023-04-27 PROCEDURE — 85610 PROTHROMBIN TIME: CPT | Performed by: STUDENT IN AN ORGANIZED HEALTH CARE EDUCATION/TRAINING PROGRAM

## 2023-04-27 PROCEDURE — 85025 COMPLETE CBC W/AUTO DIFF WBC: CPT | Performed by: STUDENT IN AN ORGANIZED HEALTH CARE EDUCATION/TRAINING PROGRAM

## 2023-04-27 PROCEDURE — 81001 URINALYSIS AUTO W/SCOPE: CPT | Performed by: STUDENT IN AN ORGANIZED HEALTH CARE EDUCATION/TRAINING PROGRAM

## 2023-04-27 PROCEDURE — 63700000 PHARM REV CODE 250 ALT 637 W/O HCPCS: Performed by: STUDENT IN AN ORGANIZED HEALTH CARE EDUCATION/TRAINING PROGRAM

## 2023-04-27 PROCEDURE — 82728 ASSAY OF FERRITIN: CPT | Performed by: STUDENT IN AN ORGANIZED HEALTH CARE EDUCATION/TRAINING PROGRAM

## 2023-04-27 PROCEDURE — 84484 ASSAY OF TROPONIN QUANT: CPT | Performed by: STUDENT IN AN ORGANIZED HEALTH CARE EDUCATION/TRAINING PROGRAM

## 2023-04-27 PROCEDURE — 99223 1ST HOSP IP/OBS HIGH 75: CPT | Mod: 95,,, | Performed by: STUDENT IN AN ORGANIZED HEALTH CARE EDUCATION/TRAINING PROGRAM

## 2023-04-27 PROCEDURE — 99223 PR INITIAL HOSPITAL CARE,LEVL III: ICD-10-PCS | Mod: ,,, | Performed by: INTERNAL MEDICINE

## 2023-04-27 PROCEDURE — 94761 N-INVAS EAR/PLS OXIMETRY MLT: CPT

## 2023-04-27 PROCEDURE — 83735 ASSAY OF MAGNESIUM: CPT | Performed by: STUDENT IN AN ORGANIZED HEALTH CARE EDUCATION/TRAINING PROGRAM

## 2023-04-27 PROCEDURE — 93005 ELECTROCARDIOGRAM TRACING: CPT

## 2023-04-27 PROCEDURE — 84100 ASSAY OF PHOSPHORUS: CPT | Performed by: STUDENT IN AN ORGANIZED HEALTH CARE EDUCATION/TRAINING PROGRAM

## 2023-04-27 PROCEDURE — 27000221 HC OXYGEN, UP TO 24 HOURS

## 2023-04-27 PROCEDURE — 36415 COLL VENOUS BLD VENIPUNCTURE: CPT | Performed by: STUDENT IN AN ORGANIZED HEALTH CARE EDUCATION/TRAINING PROGRAM

## 2023-04-27 PROCEDURE — 99223 1ST HOSP IP/OBS HIGH 75: CPT | Mod: ,,, | Performed by: INTERNAL MEDICINE

## 2023-04-27 PROCEDURE — 25000242 PHARM REV CODE 250 ALT 637 W/ HCPCS: Performed by: SURGERY

## 2023-04-27 RX ORDER — PANTOPRAZOLE SODIUM 40 MG/1
40 TABLET, DELAYED RELEASE ORAL DAILY
Status: DISCONTINUED | OUTPATIENT
Start: 2023-04-27 | End: 2023-05-02 | Stop reason: HOSPADM

## 2023-04-27 RX ORDER — ONDANSETRON 2 MG/ML
4 INJECTION INTRAMUSCULAR; INTRAVENOUS EVERY 8 HOURS PRN
Status: DISCONTINUED | OUTPATIENT
Start: 2023-04-27 | End: 2023-05-02 | Stop reason: HOSPADM

## 2023-04-27 RX ORDER — TALC
6 POWDER (GRAM) TOPICAL NIGHTLY PRN
Status: DISCONTINUED | OUTPATIENT
Start: 2023-04-27 | End: 2023-05-02 | Stop reason: HOSPADM

## 2023-04-27 RX ORDER — FUROSEMIDE 10 MG/ML
80 INJECTION INTRAMUSCULAR; INTRAVENOUS ONCE
Status: COMPLETED | OUTPATIENT
Start: 2023-04-27 | End: 2023-04-27

## 2023-04-27 RX ORDER — ENOXAPARIN SODIUM 100 MG/ML
30 INJECTION SUBCUTANEOUS EVERY 24 HOURS
Status: DISCONTINUED | OUTPATIENT
Start: 2023-04-27 | End: 2023-04-30

## 2023-04-27 RX ORDER — IPRATROPIUM BROMIDE AND ALBUTEROL SULFATE 2.5; .5 MG/3ML; MG/3ML
3 SOLUTION RESPIRATORY (INHALATION) EVERY 6 HOURS PRN
Status: DISCONTINUED | OUTPATIENT
Start: 2023-04-27 | End: 2023-05-02 | Stop reason: HOSPADM

## 2023-04-27 RX ORDER — AMIODARONE HYDROCHLORIDE 200 MG/1
200 TABLET ORAL DAILY
Status: DISCONTINUED | OUTPATIENT
Start: 2023-04-27 | End: 2023-05-02 | Stop reason: HOSPADM

## 2023-04-27 RX ORDER — ALPRAZOLAM 0.5 MG/1
2 TABLET ORAL 2 TIMES DAILY PRN
Status: DISCONTINUED | OUTPATIENT
Start: 2023-04-27 | End: 2023-05-02 | Stop reason: HOSPADM

## 2023-04-27 RX ORDER — SODIUM CHLORIDE 0.9 % (FLUSH) 0.9 %
10 SYRINGE (ML) INJECTION
Status: DISCONTINUED | OUTPATIENT
Start: 2023-04-27 | End: 2023-05-02 | Stop reason: HOSPADM

## 2023-04-27 RX ORDER — HYDRALAZINE HYDROCHLORIDE 25 MG/1
25 TABLET, FILM COATED ORAL EVERY 8 HOURS
Status: DISCONTINUED | OUTPATIENT
Start: 2023-04-27 | End: 2023-05-02 | Stop reason: HOSPADM

## 2023-04-27 RX ORDER — TRAZODONE HYDROCHLORIDE 50 MG/1
50 TABLET ORAL NIGHTLY PRN
Status: DISCONTINUED | OUTPATIENT
Start: 2023-04-27 | End: 2023-05-02 | Stop reason: HOSPADM

## 2023-04-27 RX ORDER — METOPROLOL SUCCINATE 50 MG/1
50 TABLET, EXTENDED RELEASE ORAL DAILY
Status: DISCONTINUED | OUTPATIENT
Start: 2023-04-27 | End: 2023-04-29

## 2023-04-27 RX ORDER — HYDRALAZINE HYDROCHLORIDE 20 MG/ML
10 INJECTION INTRAMUSCULAR; INTRAVENOUS EVERY 6 HOURS PRN
Status: DISCONTINUED | OUTPATIENT
Start: 2023-04-27 | End: 2023-05-02 | Stop reason: HOSPADM

## 2023-04-27 RX ORDER — AZITHROMYCIN 250 MG/1
500 TABLET, FILM COATED ORAL DAILY
Status: DISCONTINUED | OUTPATIENT
Start: 2023-04-27 | End: 2023-04-27

## 2023-04-27 RX ORDER — MIRTAZAPINE 7.5 MG/1
7.5 TABLET, FILM COATED ORAL NIGHTLY
Status: DISCONTINUED | OUTPATIENT
Start: 2023-04-27 | End: 2023-05-02 | Stop reason: HOSPADM

## 2023-04-27 RX ORDER — ENOXAPARIN SODIUM 100 MG/ML
40 INJECTION SUBCUTANEOUS EVERY 24 HOURS
Status: DISCONTINUED | OUTPATIENT
Start: 2023-04-27 | End: 2023-04-27

## 2023-04-27 RX ORDER — POLYETHYLENE GLYCOL 3350 17 G/17G
17 POWDER, FOR SOLUTION ORAL 2 TIMES DAILY
Status: DISCONTINUED | OUTPATIENT
Start: 2023-04-27 | End: 2023-05-02 | Stop reason: HOSPADM

## 2023-04-27 RX ORDER — ACETAMINOPHEN 325 MG/1
650 TABLET ORAL EVERY 4 HOURS PRN
Status: DISCONTINUED | OUTPATIENT
Start: 2023-04-27 | End: 2023-05-02 | Stop reason: HOSPADM

## 2023-04-27 RX ORDER — AMOXICILLIN 250 MG
1 CAPSULE ORAL 2 TIMES DAILY
Status: DISCONTINUED | OUTPATIENT
Start: 2023-04-27 | End: 2023-05-02 | Stop reason: HOSPADM

## 2023-04-27 RX ORDER — HYDROCORTISONE 25 MG/G
CREAM TOPICAL 2 TIMES DAILY
Status: DISCONTINUED | OUTPATIENT
Start: 2023-04-27 | End: 2023-05-02 | Stop reason: HOSPADM

## 2023-04-27 RX ORDER — POLYETHYLENE GLYCOL 3350 17 G/17G
17 POWDER, FOR SOLUTION ORAL DAILY
Status: DISCONTINUED | OUTPATIENT
Start: 2023-04-27 | End: 2023-04-27

## 2023-04-27 RX ORDER — MORPHINE SULFATE 2 MG/ML
2 INJECTION, SOLUTION INTRAMUSCULAR; INTRAVENOUS EVERY 4 HOURS PRN
Status: DISCONTINUED | OUTPATIENT
Start: 2023-04-27 | End: 2023-05-02 | Stop reason: HOSPADM

## 2023-04-27 RX ORDER — ALBUTEROL SULFATE 90 UG/1
2 AEROSOL, METERED RESPIRATORY (INHALATION) EVERY 6 HOURS PRN
Status: DISCONTINUED | OUTPATIENT
Start: 2023-04-27 | End: 2023-05-02 | Stop reason: HOSPADM

## 2023-04-27 RX ORDER — IPRATROPIUM BROMIDE AND ALBUTEROL SULFATE 2.5; .5 MG/3ML; MG/3ML
3 SOLUTION RESPIRATORY (INHALATION) EVERY 6 HOURS
Status: DISCONTINUED | OUTPATIENT
Start: 2023-04-27 | End: 2023-04-27

## 2023-04-27 RX ORDER — ATORVASTATIN CALCIUM 20 MG/1
40 TABLET, FILM COATED ORAL NIGHTLY
Status: DISCONTINUED | OUTPATIENT
Start: 2023-04-27 | End: 2023-05-02 | Stop reason: HOSPADM

## 2023-04-27 RX ORDER — PROCHLORPERAZINE EDISYLATE 5 MG/ML
5 INJECTION INTRAMUSCULAR; INTRAVENOUS EVERY 6 HOURS PRN
Status: DISCONTINUED | OUTPATIENT
Start: 2023-04-27 | End: 2023-05-02 | Stop reason: HOSPADM

## 2023-04-27 RX ADMIN — HYDRALAZINE HYDROCHLORIDE 25 MG: 25 TABLET, FILM COATED ORAL at 10:04

## 2023-04-27 RX ADMIN — POLYETHYLENE GLYCOL 3350 17 G: 17 POWDER, FOR SOLUTION ORAL at 10:04

## 2023-04-27 RX ADMIN — Medication 6 MG: at 08:04

## 2023-04-27 RX ADMIN — HYDROCORTISONE: 25 CREAM TOPICAL at 09:04

## 2023-04-27 RX ADMIN — METOPROLOL SUCCINATE 50 MG: 50 TABLET, EXTENDED RELEASE ORAL at 10:04

## 2023-04-27 RX ADMIN — CEFTRIAXONE 2 G: 2 INJECTION, POWDER, FOR SOLUTION INTRAMUSCULAR; INTRAVENOUS at 10:04

## 2023-04-27 RX ADMIN — HYDROCORTISONE: 25 CREAM TOPICAL at 02:04

## 2023-04-27 RX ADMIN — AMIODARONE HYDROCHLORIDE 200 MG: 200 TABLET ORAL at 10:04

## 2023-04-27 RX ADMIN — FUROSEMIDE 80 MG: 10 INJECTION, SOLUTION INTRAMUSCULAR; INTRAVENOUS at 04:04

## 2023-04-27 RX ADMIN — POLYETHYLENE GLYCOL 3350 17 G: 17 POWDER, FOR SOLUTION ORAL at 09:04

## 2023-04-27 RX ADMIN — ATORVASTATIN CALCIUM 40 MG: 20 TABLET, FILM COATED ORAL at 08:04

## 2023-04-27 RX ADMIN — ISOSORBIDE DINITRATE 30 MG: 20 TABLET ORAL at 08:04

## 2023-04-27 RX ADMIN — AZITHROMYCIN MONOHYDRATE 500 MG: 250 TABLET ORAL at 10:04

## 2023-04-27 RX ADMIN — SACUBITRIL AND VALSARTAN 1 TABLET: 49; 51 TABLET, FILM COATED ORAL at 08:04

## 2023-04-27 RX ADMIN — ENOXAPARIN SODIUM 30 MG: 30 INJECTION SUBCUTANEOUS at 05:04

## 2023-04-27 RX ADMIN — HYDRALAZINE HYDROCHLORIDE 25 MG: 25 TABLET, FILM COATED ORAL at 02:04

## 2023-04-27 RX ADMIN — PANTOPRAZOLE SODIUM 40 MG: 40 TABLET, DELAYED RELEASE ORAL at 10:04

## 2023-04-27 RX ADMIN — SACUBITRIL AND VALSARTAN 1 TABLET: 49; 51 TABLET, FILM COATED ORAL at 10:04

## 2023-04-27 RX ADMIN — SENNOSIDES AND DOCUSATE SODIUM 1 TABLET: 8.6; 5 TABLET ORAL at 08:04

## 2023-04-27 RX ADMIN — MIRTAZAPINE 7.5 MG: 7.5 TABLET, FILM COATED ORAL at 08:04

## 2023-04-27 RX ADMIN — HYDRALAZINE HYDROCHLORIDE 10 MG: 20 INJECTION, SOLUTION INTRAMUSCULAR; INTRAVENOUS at 04:04

## 2023-04-27 RX ADMIN — FUROSEMIDE 20 MG/HR: 10 INJECTION, SOLUTION INTRAMUSCULAR; INTRAVENOUS at 05:04

## 2023-04-27 RX ADMIN — ISOSORBIDE DINITRATE 30 MG: 20 TABLET ORAL at 02:04

## 2023-04-27 RX ADMIN — ISOSORBIDE DINITRATE 30 MG: 20 TABLET ORAL at 10:04

## 2023-04-27 NOTE — PROGRESS NOTES
Pharmacist Renal Dose Adjustment Note    Hafsa Hawley is a 57 y.o. female being treated with the medication lovenox    Patient Data:    Vital Signs (Most Recent):  Temp: 97.7 °F (36.5 °C) (04/27/23 0929)  Pulse: 95 (04/27/23 1047)  Resp: (!) 22 (04/27/23 0929)  BP: (!) 146/86 (04/27/23 0929)  SpO2: 97 % (04/27/23 0929)   Vital Signs (72h Range):  Temp:  [97.7 °F (36.5 °C)-98.7 °F (37.1 °C)]   Pulse:  []   Resp:  [14-31]   BP: (118-156)/()   SpO2:  [88 %-98 %]      Recent Labs   Lab 04/26/23  1342 04/27/23  0428   CREATININE 3.0* 2.6*     Serum creatinine: 2.6 mg/dL (H) 04/27/23 0428  Estimated creatinine clearance: 26.9 mL/min (A)    Medication:lovenox dose: 40mg frequency q24h will be changed to medication:lovenox dose:30mg frequency:q24h    Pharmacist's Name: Brien Beck  Pharmacist's Extension: [phone]

## 2023-04-27 NOTE — ASSESSMENT & PLAN NOTE
Unclear etiology of pulmonary hypertension but likely WHO group 2  Last RHC on 3/29/23 shows pre and post capillary PH

## 2023-04-27 NOTE — ASSESSMENT & PLAN NOTE
-NICM, HFrEF EF of 15%, LVEDD 7.07 cm per previous echo 3/23/23/  -GDMT: metoprolol succinate 50mg qd, entresto 49-51 BID, Jardiance, isordil 30 TID.   -Home Diuretic: bumex 1mg BID  -Volume overloaded on exam, will give lasix 80mg IV x 1 and start on lasix gtt at 20mg/hr and monitor twice daily chemistries for improvement, likely cardiorenal syndrome  -Strict I/Os, daily weight  -Last RHC 3/29/23 RA 9, PCWP 16, PA 58/29, meann PA 39, PA sat 45%, AO sat 90% on RA, PVR 5.6 BOLDEN, CO/CI 4.1/2.03, SVR 1151, BP 88/58 , MAP 68, HR 75 SR Hb 10.7 Jayson 3.2  -Presented to selection committee 3/9/2022 declined due to renal function, lung function, and difficulty with consistent follow up with the HTS team

## 2023-04-27 NOTE — HPI
Ms. Hafsa Hawley is a 57 year old woman with PMH of HTN, COPD, CKD, NICM s/p ICD, HFrEF (EF 15%, LVEDD 7.3cm), permanent AF( on eliquis), Gilbert's syndrome, pulmonary hypertension, PSVT, MELISSA (nonadherent to CPAP), and GERD who presented to [place] ED on 4/26/23 with chief complaint of weakness and SOB. Associated symptoms include vomiting, fatigue, chest discomfort, and HA. Family reports decreased urine output as well. Patient reports that she has had difficulty ambulating and performing ADLS for the past 2 days secondary to both shortness of breath and pain in her bilateral feet, she thinks it was a gout flare. The day prior arrival, she experienced chest pressure that worsened the following morning. Her primary cardiologist is Dr. Benson guajardo.  She also follows HTS in clinic, was stable at her last visit on 4/17/23.  Current regimen includes metoprolol succinate 50 mg daily, entresto 49/51mg BID, jardiance. Not on MRA due to renal funciton. Volume mgmt with Bumex 1 mg BID.     Of note the patient was presented to [facility] committee on 3/29/23 for a second time and was declined for Heart Transplant listing due to poor medical adherence, declined pulmonary function and declined renal function. She has been evaluated by pulmonology with no evidence of ILD or COPD on PFTs, chronic hypoxia and hypercapneic respiratory failure likely related to OHS/MELISSA.    In the [facility] ED, vital signs showed BP 140s/90s, HR , RR 16-20 and O2 sat 92-95% on RA. Labwork significant for WBC 6.88, Hb 11, Na 14, K 4.8, BUN 81, Cr 3.0 (baseline Cr 2.1-2.5), CO2 18, uric acid 13.6, Bili 3.2, AST/ALT wnl, BNP > 4,900, CPK 31, troponin 0.900, lactate 2.1. EKG NSR. Flu and COVID negative. CXR showed vascular congestion and mild edema. HCT was nonacute. Blood cultures were collected and patient was given 1 L NS bolus, ASA, statin, and nitro paste. She was also given protonix and IV zofran. She was later given 40 mg IV  radha. Dr. Adriano Low ([place] [facility]) contacted [facility] to request patient transfer to higher level of care for cardiology and HTS consultation for evaluation and management of ADHF and pulmonary hypertension. [facility] notified Dr. Peck ([facility]) and the case was discussed, the patient was accepted by Dr. Peck for cardiology evaluation.  Hospital Medicine consulted for direct admission.

## 2023-04-27 NOTE — CONSULTS
"[facility]  Cardiology  Consult Note    Patient Name: Hafsa Hawley  MRN: [id number]  Admission Date: 4/27/2023  Hospital Length of Stay: 0 days  Code Status: Full Code   Attending Provider: Julia Barone MD   Consulting Provider: Christopher Bailey DO  Primary Care Physician: Cristobal Ann MD  Principal Problem:Acute decompensated heart failure    Patient information was obtained from patient and ER records.     Inpatient consult to Cardiology  Consult performed by: Christopher Bailey DO  Consult ordered by: Trell Urias MD        Subjective:          HPI:   Hafsa Hawley is a 57 YOF with hx of HTN, COPD, CKD, NICM s/p ICD EF 15%, AF on Eliquis, Gilbert's syndrome, pHTN, and MELISSA presenting with worsening SOB and fatigue found to be in ADHF and COPD exacerbation. Pt reports that she has been having worsening fatigue, SOB,worsened orthopnea, abdominal distension,  and foot edema over the last week. She reports that even though she has been compliant with her bumex, she has had reduced urinary output as well. She has a chronic cough which has worsened with increased phlegm production as well. 2 weeks ago, she was treated for gout in her knees with steroids and a "pain shot". She reports increased UOP and symptomatic improvement since initiation of lasix gtt.  She denies fevers, chills, chest pain, palpitations. Pt has been evaluated for a Noah in the past but has been denied due to poor medical compliance and other significant co-morbidities such as renal and lung disease.         Past Medical History:   Diagnosis Date    Anemia     Arthritis     Atrial fibrillation     OAC    Chronic respiratory failure with hypoxia, on home oxygen therapy     2L with activity, off at rest.  Per Pulm  no overt evidence of ILD or COPD on PFTs and CT to explain O2 needs.    CKD (chronic kidney disease), stage IV 05/08/2018    Congestive heart failure     s/p AICD placement,    Deep vein thrombosis     " Depression     elevated bilirubin d/t Gilbert's syndrome     confirmed by [place] genetic testing, evaluated by hepatology    Encounter for blood transfusion     GERD (gastroesophageal reflux disease)     Hypertension     Pheochromocytoma, malignant     Right kidney mass     Sleep apnea     Thalassemia trait, alpha     Thyroid disease     Type 2 diabetes mellitus with hyperglycemia, without long-term current use of insulin 08/13/2020       Past Surgical History:   Procedure Laterality Date    APPENDECTOMY      BONE MARROW BIOPSY      CARDIAC DEFIBRILLATOR PLACEMENT Left 12/2016    CARDIAC ELECTROPHYSIOLOGY MAPPING AND ABLATION      CARDIAC ELECTROPHYSIOLOGY MAPPING AND ABLATION      COLONOSCOPY N/A 5/6/2022    Procedure: COLONOSCOPY;  Surgeon: Arely Betancourt MD;  Location: [facility] ENDO (2ND FLR);  Service: Endoscopy;  Laterality: N/A;  heart transplant candidate/ EF 25% - 2nd floor/ defib - Bi[facility]ron[facility] - [facility]  Eliquis - per Dr. Cortez with [facility], Pt ok to hold Eliquis x 2 days prior-see media tab-outside correspondence dated 12/30/21  - [facility]  verbal instructions/portal instructions/email instructions - s    EYE SURGERY      due to running tears    FRACTURE SURGERY Left     hand 5th digit    HYSTERECTOMY      KNEE SURGERY Left 2016    hematoma    LIVER BIOPSY  10/24/2018    Minimal steatosis, predominantly macrovesicular, 1%, Minimal nonspecific portal inflammation, no fibrosis. No findings on biopsy to explain elevated bilirubin levels. Could be d/t Gilbert's =?- hemolysis    RIGHT HEART CATHETERIZATION Right 12/07/2021    Procedure: INSERTION, CATHETER, RIGHT HEART;  Surgeon: Irving Cardenas MD;  Location: [facility] CATH LAB;  Service: Cardiology;  Laterality: Right;    RIGHT HEART CATHETERIZATION Right 12/19/2022    Procedure: INSERTION, CATHETER, RIGHT HEART;  Surgeon: Burke Camilo MD;  Location: [facility] CATH LAB;  Service: Cardiology;  Laterality: Right;    RIGHT HEART CATHETERIZATION Right  "3/29/2023    Procedure: INSERTION, CATHETER, RIGHT HEART;  Surgeon: Katie Liriano DO;  Location: [facility] CATH LAB;  Service: Cardiology;  Laterality: Right;    TRANSJUGULAR BIOPSY OF LIVER N/A 10/24/2018    Procedure: BIOPSY, LIVER, TRANSJUGULAR APPROACH;  Surgeon: Carmen Diagnostic Provider;  Location: [facility] OR [address] [place] [address];  Service: Radiology;  Laterality: N/A;       Review of patient's allergies indicates:   Allergen Reactions    Penicillins Hives and Other (See Comments)    Iodinated contrast media Nausea And Vomiting    Oxycodone-acetaminophen Other (See Comments)     Nausea, Dizziness, Anxiety.  "I don't like how it makes me feel."   Given Hydromorphone 0.5mg IVP  Without problems.  Other reaction(s): Other (See Comments)    Clonazepam Other (See Comments)    Diovan hct [valsartan-hydrochlorothiazide] Other (See Comments)     Causes coughing    Iodine Other (See Comments)    Irinotecan      Pt has homozygosity for the TA7 promoter variant that places this individual at significantly increased risk for   severe neutropenia (grade 4) when treated with the standard dose of irinotecan (risk approximately 50%).   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 Metabolism of other drugs not listed here may also be impacted by UGT1A1 enzyme activity.       Tramadol Nausea And Vomiting and Other (See Comments)     Other reaction(s): Other (See Comments)    Valsartan Other (See Comments)       Current Facility-Administered Medications on File Prior to Encounter   Medication    0.9%  NaCl infusion    [COMPLETED] 0.9%  NaCl infusion    [COMPLETED] aspirin tablet 325 mg    [COMPLETED] atorvastatin tablet 40 mg    [COMPLETED] furosemide injection 40 mg    [COMPLETED] nitroGLYCERIN 2% TD oint ointment 0.5 inch    [COMPLETED] ondansetron injection 4 mg    [COMPLETED] pantoprazole EC tablet 40 mg "    vancomycin in dextrose 5 % 1 gram/250 mL IVPB 1,000 mg    [DISCONTINUED] albuterol-ipratropium 2.5 mg-0.5 mg/3 mL nebulizer solution 3 mL     Current Outpatient Medications on File Prior to Encounter   Medication Sig    albuterol-ipratropium (DUO-NEB) 2.5 mg-0.5 mg/3 mL nebulizer solution Take 3 mLs by nebulization 2 (two) times a day.    allopurinoL (ZYLOPRIM) 100 MG tablet Take 1 tablet (100 mg total) by mouth daily as needed (gout attack).    ALPRAZolam (XANAX) 2 MG Tab Take 2 mg by mouth 2 (two) times daily.    amiodarone (PACERONE) 200 MG Tab Take 200 mg by mouth.    atorvastatin (LIPITOR) 40 MG tablet Take 1 tablet (40 mg total) by mouth every evening.    b complex vitamins tablet Take 1 tablet by mouth once daily.    blood sugar diagnostic (ACCU-CHEK GUIDE TEST STRIPS) Strp 1 strip by Other route 3 (three) times daily.    bumetanide (BUMEX) 2 MG tablet Take 1 tablet (2 mg total) by mouth 2 (two) times a day.    cetirizine (ZYRTEC) 10 MG tablet Take 10 mg by mouth daily as needed for Allergies.    chlorthalidone (HYGROTEN) 25 MG Tab Take 1 tablet (25 mg total) by mouth daily as needed. (Patient not taking: Reported on 4/11/2023)    colchicine (COLCRYS) 0.6 mg tablet Take 1 tablet (0.6 mg total) by mouth 2 (two) times daily. for 7 days (Patient not taking: Reported on 4/11/2023)    diclofenac sodium (VOLTAREN) 1 % Gel APPLY 2 G TOPICALLY 3 (THREE) TIMES DAILY AS NEEDED (PAIN).    ELIQUIS 5 mg Tab TAKE 1 TABLET (5 MG TOTAL) BY MOUTH 2 (TWO) TIMES DAILY.    empagliflozin (JARDIANCE) 10 mg tablet TAKE 1 TABLET BY MOUTH EVERY DAY    ergocalciferol (ERGOCALCIFEROL) 50,000 unit Cap Take 1 capsule (50,000 Units total) by mouth every 7 days. (Patient taking differently: Take 50,000 Units by mouth every Saturday.)    fluticasone propionate (FLONASE) 50 mcg/actuation nasal spray 2 sprays by Each Nostril route daily as needed for Rhinitis.     isosorbide dinitrate (ISORDIL) 30 MG Tab Take 1 tablet  (30 mg total) by mouth 3 (three) times daily.    lancets (ACCU-CHEK SOFTCLIX LANCETS) Misc 1 Device by Misc.(Non-Drug; Combo Route) route 3 (three) times daily.    LIDOcaine (LIDODERM) 5 % Place 1 patch onto the skin daily as needed (pain). Remove & Discard patch within 12 hours or as directed by MD    metoprolol succinate (TOPROL-XL) 50 MG 24 hr tablet Take 1 tablet (50 mg total) by mouth once daily.    mirtazapine (REMERON) 7.5 MG Tab Take 1 tablet (7.5 mg total) by mouth every evening.    mometasone-formoterol (DULERA) 200-5 mcg/actuation inhaler Inhale 2 puffs into the lungs 2 (two) times daily. Controller    ondansetron (ZOFRAN-ODT) 4 MG TbDL TAKE 1 TABLET (4 MG TOTAL) BY MOUTH 2 (TWO) TIMES DAILY.    pantoprazole (PROTONIX) 40 MG tablet TAKE 1 TABLET BY MOUTH DAILY IN THE MORNING    potassium chloride (MICRO-K) 10 MEQ CpSR Take 1 capsule (10 mEq total) by mouth once daily.    predniSONE (DELTASONE) 20 MG tablet Take 1 tablet (20 mg total) by mouth 2 (two) times daily. (Patient not taking: Reported on 4/11/2023)    pregabalin (LYRICA) 25 MG capsule Take 1 capsule (25 mg total) by mouth 2 (two) times daily.    promethazine-codeine 6.25-10 mg/5 ml (PHENERGAN WITH CODEINE) 6.25-10 mg/5 mL syrup TAKE 5 MLS BY MOUTH EVERY 4-6 HOURS AS NEEDED (Patient not taking: Reported on 4/11/2023)    sacubitriL-valsartan (ENTRESTO) 49-51 mg per tablet TAKE 1 TABLET BY MOUTH TWICE A DAY    semaglutide 0.25 mg or 0.5 mg (2 mg/3 mL) PnIj Inject 0.5 mg into the skin every 7 days.    tiotropium bromide (SPIRIVA RESPIMAT) 1.25 mcg/actuation inhaler Inhale 2 puffs into the lungs once daily. Controller    traZODone (DESYREL) 50 MG tablet Take 25 mg by mouth nightly as needed.    VENTOLIN HFA 90 mcg/actuation inhaler Inhale 2 puffs into the lungs every 6 (six) hours as needed for Shortness of Breath or Wheezing.    [DISCONTINUED] ferrous sulfate 325 (65 FE) MG EC tablet Take 1 tablet (325 mg total) by mouth once daily.     [DISCONTINUED] ondansetron (ZOFRAN-ODT) 8 MG TbDL Take 1 tablet (8 mg total) by mouth every 12 (twelve) hours as needed (nausea).    [DISCONTINUED] scopolamine (TRANSDERM-SCOP) 1.3-1.5 mg (1 mg over 3 days) Place 1 patch onto the skin every 72 hours as needed (nausea).     Family History       Problem Relation (Age of Onset)    Cancer Mother    Cirrhosis Brother    Diabetes Brother    Heart attack Father    Heart disease Father, Sister, Brother, Sister, Brother    Hypertension Father, Brother          Tobacco Use    Smoking status: Never    Smokeless tobacco: Never   Substance and Sexual Activity    Alcohol use: Yes     Comment: up to 1 yr ago drank 2-3 drinks on occasion but sporadic    Drug use: No    Sexual activity: Yes     Partners: Male     Review of Systems   Constitutional: Negative for chills and fever.   HENT:  Negative for congestion.    Eyes:  Negative for blurred vision and double vision.   Cardiovascular:  Positive for dyspnea on exertion, leg swelling and orthopnea. Negative for chest pain.   Respiratory:  Positive for cough and shortness of breath.    Musculoskeletal:  Negative for back pain and myalgias.   Gastrointestinal:  Negative for abdominal pain, diarrhea, hematochezia, melena, nausea and vomiting.   Genitourinary:  Negative for flank pain and frequency.   Neurological:  Negative for dizziness and headaches.   All other systems reviewed and are negative.  Objective:     Vital Signs (Most Recent):  Temp: 98.4 °F (36.9 °C) (04/27/23 0618)  Pulse: 97 (04/27/23 0800)  Resp: 18 (04/27/23 0800)  BP: 118/64 (04/27/23 0618)  SpO2: (!) 94 % (04/27/23 0800)   Vital Signs (24h Range):  Temp:  [98.4 °F (36.9 °C)-98.7 °F (37.1 °C)] 98.4 °F (36.9 °C)  Pulse:  [] 97  Resp:  [14-31] 18  SpO2:  [88 %-98 %] 94 %  BP: (118-156)/() 118/64     Weight: 89.6 kg (197 lb 8.5 oz)  Body mass index is 31.88 kg/m².    SpO2: (!) 94 %         Intake/Output Summary (Last 24 hours) at 4/27/2023  0849  Last data filed at 4/27/2023 0710  Gross per 24 hour   Intake --   Output 600 ml   Net -600 ml       Lines/Drains/Airways       Peripheral Intravenous Line  Duration                  Peripheral IV - Single Lumen 04/26/23 0200 20 G Right Hand 1 day                    Physical Exam  Vitals and nursing note reviewed.   Constitutional:       General: She is not in acute distress.     Appearance: Normal appearance. She is obese. She is ill-appearing (chronic).   HENT:      Head: Normocephalic and atraumatic.   Eyes:      Extraocular Movements: Extraocular movements intact.      Pupils: Pupils are equal, round, and reactive to light.   Cardiovascular:      Rate and Rhythm: Normal rate and regular rhythm.      Pulses: Normal pulses.   Pulmonary:      Effort: Pulmonary effort is normal. No respiratory distress.      Breath sounds: Wheezing present.   Abdominal:      General: There is no distension.      Palpations: Abdomen is soft.      Tenderness: There is no abdominal tenderness.   Musculoskeletal:      Right lower leg: Edema present.      Left lower leg: Edema present.   Skin:     General: Skin is warm and dry.   Neurological:      General: No focal deficit present.      Mental Status: She is alert and oriented to person, place, and time.       Significant Labs: CMP   Recent Labs   Lab 04/26/23  1342 04/27/23  0428    139   K 4.8 5.0    104   CO2 23 21*   * 149*   BUN 81* 78*   CREATININE 3.0* 2.6*   CALCIUM 9.7 9.3   PROT 6.6 5.7*   ALBUMIN 3.9 3.3*   BILITOT 3.2* 3.3*   ALKPHOS 63 60   AST 24 22   ALT 39 29   ANIONGAP 13 14    and CBC   Recent Labs   Lab 04/26/23  1342 04/27/23  0428   WBC 6.88 5.80   HGB 11.0* 10.4*   HCT 39.7 37.0    157       Significant Imaging: X-Ray: CXR: X-Ray Chest 1 View (CXR): No results found for this visit on 04/27/23.    Assessment and Plan:     * Acute decompensated heart failure  57 YOF with hx of HFrEF 15%, NICM w/ ICD, COPD, CKD presenting with one week  of SOB, LE edema, and distension along with worsening productive cough admitted for ADHF with COPD exacerbation. Likely trigger for ADHF is COPD exacerbation. Could also be recent steroid use from gout treatment. No signs of cardiogenic shock. Diuresing well on lasix gtt.     Recommendations  - Continue Lasix drip 20 mg/hr and titrate to UOP 2-3L per day  - Check BMP twice a day on lasix drip  - CrCl too low for aldactone and Jardiance  - K>4, Mg >2            VTE Risk Mitigation (From admission, onward)         Ordered     enoxaparin injection 30 mg  Daily         04/27/23 1055     IP VTE HIGH RISK PATIENT  Once         04/27/23 1031     Place sequential compression device  Until discontinued         04/27/23 0417                Thank you for your consult. I will sign off. Please contact us if you have any additional questions.    Christopher Bailey, DO  Cardiology   [facility]

## 2023-04-27 NOTE — H&P
Arie Vazquez - Cardiology [facility] Medicine  History & Physical    Patient Name: Hafsa Hawley  MRN: [id number]  Patient Class: IP- Inpatient  Admission Date: 4/27/2023  Attending Physician: Genny Mckinney MD   Primary Care Provider: Cristobal Ann MD         Patient information was obtained from patient and ER records.     Subjective:     Principal Problem:Acute decompensated heart failure    Chief Complaint: No chief complaint on file.       HPI: Ms. Hafsa Hawley is a 57 year old woman with PMH of HTN, COPD, CKD, NICM s/p ICD, HFrEF (EF 15%, LVEDD 7.3cm), permanent AF( on eliquis), Gilbert's syndrome, pulmonary hypertension, PSVT, MELISSA (nonadherent to CPAP), and GERD who presented to [facility] ED on 4/26/23 with chief complaint of weakness and SOB. Associated symptoms include vomiting, fatigue, chest discomfort, and HA. Family reports decreased urine output as well. Patient reports that she has had difficulty ambulating and performing ADLS for the past 2 days secondary to both shortness of breath and pain in her bilateral feet, she thinks it was a gout flare. The day prior arrival, she experienced chest pressure that worsened the following morning. Her primary cardiologist is Dr. Benson guajardo.  She also follows HTS in clinic, was stable at her last visit on 4/17/23.  Current regimen includes metoprolol succinate 50 mg daily, entresto 49/51mg BID, jardiance. Not on MRA due to renal funciton. Volume mgmt with Bumex 1 mg BID.     Of note the patient was presented to [facility]HTx committee on 3/29/23 for a second time and was declined for Heart Transplant listing due to poor medical adherence, declined pulmonary function and declined renal function. She has been evaluated by pulmonology with no evidence of ILD or COPD on PFTs, chronic hypoxia and hypercapneic respiratory failure likely related to OHS/MELISSA.    In the [facility] ED, vital signs showed BP 140s/90s, HR , RR 16-20 and O2 sat 92-95% on  RA. Labwork significant for WBC 6.88, Hb 11, Na 14, K 4.8, BUN 81, Cr 3.0 (baseline Cr 2.1-2.5), CO2 18, uric acid 13.6, Bili 3.2, AST/ALT wnl, BNP > 4,900, CPK 31, troponin 0.900, lactate 2.1. EKG NSR. Flu and COVID negative. CXR showed vascular congestion and mild edema. HCT was nonacute. Blood cultures were collected and patient was given 1 L NS bolus, ASA, statin, and nitro paste. She was also given protonix and IV zofran. She was later given 40 mg IV lasix. Dr. Adriano Low ([facility]) contacted [facility] to request patient transfer to higher level of care for cardiology and HTS consultation for evaluation and management of ADHF and pulmonary hypertension. [facility] notified Dr. Peck ([facility]) and the case was discussed, the patient was accepted by Dr. Peck for cardiology evaluation.  Hospital Medicine consulted for direct admission.         Past Medical History:   Diagnosis Date    Anemia     Arthritis     Atrial fibrillation     OAC    Chronic respiratory failure with hypoxia, on home oxygen therapy     2L with activity, off at rest.  Per Pulm  no overt evidence of ILD or COPD on PFTs and CT to explain O2 needs.    CKD (chronic kidney disease), stage IV 05/08/2018    Congestive heart failure     s/p AICD placement,    Deep vein thrombosis     Depression     elevated bilirubin d/t Gilbert's syndrome     confirmed by [place] genetic testing, evaluated by hepatology    Encounter for blood transfusion     GERD (gastroesophageal reflux disease)     Hypertension     Pheochromocytoma, malignant     Right kidney mass     Sleep apnea     Thalassemia trait, alpha     Thyroid disease     Type 2 diabetes mellitus with hyperglycemia, without long-term current use of insulin 08/13/2020       Past Surgical History:   Procedure Laterality Date    APPENDECTOMY      BONE MARROW BIOPSY      CARDIAC DEFIBRILLATOR PLACEMENT Left 12/2016    CARDIAC ELECTROPHYSIOLOGY MAPPING AND ABLATION      CARDIAC  "ELECTROPHYSIOLOGY MAPPING AND ABLATION      COLONOSCOPY N/A 5/6/2022    Procedure: COLONOSCOPY;  Surgeon: Arely Betancourt MD;  Location: [facility] ENDO (2ND FLR);  Service: Endoscopy;  Laterality: N/A;  heart transplant candidate/ EF 25% - 2nd floor/ defib - Biotron[facility] - ERW  Eliquis - per Dr. Cortez with [facility], Pt ok to hold Eliquis x 2 days prior-see media tab-outside correspondence dated 12/30/21  - ERW  verbal instructions/portal instructions/email instructions - s    EYE SURGERY      due to running tears    FRACTURE SURGERY Left     hand 5th digit    HYSTERECTOMY      KNEE SURGERY Left 2016    hematoma    LIVER BIOPSY  10/24/2018    Minimal steatosis, predominantly macrovesicular, 1%, Minimal nonspecific portal inflammation, no fibrosis. No findings on biopsy to explain elevated bilirubin levels. Could be d/t Gilbert's =?- hemolysis    RIGHT HEART CATHETERIZATION Right 12/07/2021    Procedure: INSERTION, CATHETER, RIGHT HEART;  Surgeon: Irving Cardenas MD;  Location: [facility] CATH LAB;  Service: Cardiology;  Laterality: Right;    RIGHT HEART CATHETERIZATION Right 12/19/2022    Procedure: INSERTION, CATHETER, RIGHT HEART;  Surgeon: Burke Camilo MD;  Location: [facility] CATH LAB;  Service: Cardiology;  Laterality: Right;    RIGHT HEART CATHETERIZATION Right 3/29/2023    Procedure: INSERTION, CATHETER, RIGHT HEART;  Surgeon: Katie Liriano DO;  Location: [facility] CATH LAB;  Service: Cardiology;  Laterality: Right;    TRANSJUGULAR BIOPSY OF LIVER N/A 10/24/2018    Procedure: BIOPSY, LIVER, TRANSJUGULAR APPROACH;  Surgeon: [facility] Diagnostic Provider;  Location: [facility] OR 2ND FLR;  Service: Radiology;  Laterality: N/A;       Review of patient's allergies indicates:   Allergen Reactions    Penicillins Hives and Other (See Comments)    Iodinated contrast media Nausea And Vomiting    Oxycodone-acetaminophen Other (See Comments)     Nausea, Dizziness, Anxiety.  "I don't like how it makes me feel."   Given " Hydromorphone 0.5mg IVP  Without problems.  Other reaction(s): Other (See Comments)    Clonazepam Other (See Comments)    Diovan hct [valsartan-hydrochlorothiazide] Other (See Comments)     Causes coughing    Iodine Other (See Comments)    Irinotecan      Pt has homozygosity for the TA7 promoter variant that places this individual at significantly increased risk for   severe neutropenia (grade 4) when treated with the standard dose of irinotecan (risk approximately 50%).   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 Metabolism of other drugs not listed here may also be impacted by UGT1A1 enzyme activity.       Tramadol Nausea And Vomiting and Other (See Comments)     Other reaction(s): Other (See Comments)    Valsartan Other (See Comments)       Current Facility-Administered Medications on File Prior to Encounter   Medication    0.9%  NaCl infusion    [COMPLETED] 0.9%  NaCl infusion    [COMPLETED] aspirin tablet 325 mg    [COMPLETED] atorvastatin tablet 40 mg    [COMPLETED] furosemide injection 40 mg    [COMPLETED] nitroGLYCERIN 2% TD oint ointment 0.5 inch    [COMPLETED] ondansetron injection 4 mg    [COMPLETED] pantoprazole EC tablet 40 mg    vancomycin in dextrose 5 % 1 gram/250 mL IVPB 1,000 mg    [DISCONTINUED] albuterol-ipratropium 2.5 mg-0.5 mg/3 mL nebulizer solution 3 mL     Current Outpatient Medications on File Prior to Encounter   Medication Sig    albuterol-ipratropium (DUO-NEB) 2.5 mg-0.5 mg/3 mL nebulizer solution Take 3 mLs by nebulization 2 (two) times a day.    allopurinoL (ZYLOPRIM) 100 MG tablet Take 1 tablet (100 mg total) by mouth daily as needed (gout attack).    ALPRAZolam (XANAX) 2 MG Tab Take 2 mg by mouth 2 (two) times daily.    amiodarone (PACERONE) 200 MG Tab Take 200 mg by mouth.    atorvastatin (LIPITOR) 40 MG tablet Take 1 tablet (40 mg total)  by mouth every evening.    b complex vitamins tablet Take 1 tablet by mouth once daily.    blood sugar diagnostic (ACCU-CHEK GUIDE TEST STRIPS) Strp 1 strip by Other route 3 (three) times daily.    bumetanide (BUMEX) 2 MG tablet Take 1 tablet (2 mg total) by mouth 2 (two) times a day.    cetirizine (ZYRTEC) 10 MG tablet Take 10 mg by mouth daily as needed for Allergies.    chlorthalidone (HYGROTEN) 25 MG Tab Take 1 tablet (25 mg total) by mouth daily as needed. (Patient not taking: Reported on 4/11/2023)    colchicine (COLCRYS) 0.6 mg tablet Take 1 tablet (0.6 mg total) by mouth 2 (two) times daily. for 7 days (Patient not taking: Reported on 4/11/2023)    diclofenac sodium (VOLTAREN) 1 % Gel APPLY 2 G TOPICALLY 3 (THREE) TIMES DAILY AS NEEDED (PAIN).    ELIQUIS 5 mg Tab TAKE 1 TABLET (5 MG TOTAL) BY MOUTH 2 (TWO) TIMES DAILY.    empagliflozin (JARDIANCE) 10 mg tablet TAKE 1 TABLET BY MOUTH EVERY DAY    ergocalciferol (ERGOCALCIFEROL) 50,000 unit Cap Take 1 capsule (50,000 Units total) by mouth every 7 days. (Patient taking differently: Take 50,000 Units by mouth every Saturday.)    fluticasone propionate (FLONASE) 50 mcg/actuation nasal spray 2 sprays by Each Nostril route daily as needed for Rhinitis.     isosorbide dinitrate (ISORDIL) 30 MG Tab Take 1 tablet (30 mg total) by mouth 3 (three) times daily.    lancets (ACCU-CHEK SOFTCLIX LANCETS) Misc 1 Device by Misc.(Non-Drug; Combo Route) route 3 (three) times daily.    LIDOcaine (LIDODERM) 5 % Place 1 patch onto the skin daily as needed (pain). Remove & Discard patch within 12 hours or as directed by MD    metoprolol succinate (TOPROL-XL) 50 MG 24 hr tablet Take 1 tablet (50 mg total) by mouth once daily.    mirtazapine (REMERON) 7.5 MG Tab Take 1 tablet (7.5 mg total) by mouth every evening.    mometasone-formoterol (DULERA) 200-5 mcg/actuation inhaler Inhale 2 puffs into the lungs 2 (two) times daily. Controller    ondansetron (ZOFRAN-ODT) 4  MG TbDL TAKE 1 TABLET (4 MG TOTAL) BY MOUTH 2 (TWO) TIMES DAILY.    pantoprazole (PROTONIX) 40 MG tablet TAKE 1 TABLET BY MOUTH DAILY IN THE MORNING    potassium chloride (MICRO-K) 10 MEQ CpSR Take 1 capsule (10 mEq total) by mouth once daily.    predniSONE (DELTASONE) 20 MG tablet Take 1 tablet (20 mg total) by mouth 2 (two) times daily. (Patient not taking: Reported on 4/11/2023)    pregabalin (LYRICA) 25 MG capsule Take 1 capsule (25 mg total) by mouth 2 (two) times daily.    promethazine-codeine 6.25-10 mg/5 ml (PHENERGAN WITH CODEINE) 6.25-10 mg/5 mL syrup TAKE 5 MLS BY MOUTH EVERY 4-6 HOURS AS NEEDED (Patient not taking: Reported on 4/11/2023)    sacubitriL-valsartan (ENTRESTO) 49-51 mg per tablet TAKE 1 TABLET BY MOUTH TWICE A DAY    semaglutide 0.25 mg or 0.5 mg (2 mg/3 mL) PnIj Inject 0.5 mg into the skin every 7 days.    tiotropium bromide (SPIRIVA RESPIMAT) 1.25 mcg/actuation inhaler Inhale 2 puffs into the lungs once daily. Controller    traZODone (DESYREL) 50 MG tablet Take 25 mg by mouth nightly as needed.    VENTOLIN HFA 90 mcg/actuation inhaler Inhale 2 puffs into the lungs every 6 (six) hours as needed for Shortness of Breath or Wheezing.    [DISCONTINUED] ferrous sulfate 325 (65 FE) MG EC tablet Take 1 tablet (325 mg total) by mouth once daily.    [DISCONTINUED] ondansetron (ZOFRAN-ODT) 8 MG TbDL Take 1 tablet (8 mg total) by mouth every 12 (twelve) hours as needed (nausea).    [DISCONTINUED] scopolamine (TRANSDERM-SCOP) 1.3-1.5 mg (1 mg over 3 days) Place 1 patch onto the skin every 72 hours as needed (nausea).     Family History       Problem Relation (Age of Onset)    Cancer Mother    Cirrhosis Brother    Diabetes Brother    Heart attack Father    Heart disease Father, Sister, Brother, Sister, Brother    Hypertension Father, Brother          Tobacco Use    Smoking status: Never    Smokeless tobacco: Never   Substance and Sexual Activity    Alcohol use: Yes     Comment: up to 1 yr  ago drank 2-3 drinks on occasion but sporadic    Drug use: No    Sexual activity: Yes     Partners: Male     Review of Systems   Constitutional:  Positive for activity change, appetite change and fever. Negative for unexpected weight change.   Respiratory:  Positive for cough, chest tightness, shortness of breath and wheezing.    Cardiovascular:  Positive for chest pain, palpitations and leg swelling.   Gastrointestinal:  Positive for abdominal distention, abdominal pain, constipation, nausea and vomiting. Negative for diarrhea.   Genitourinary:  Positive for decreased urine volume.   Musculoskeletal:  Positive for arthralgias and joint swelling.   Neurological:  Positive for weakness and headaches. Negative for syncope.   Objective:     Vital Signs (Most Recent):  Temp: 98.5 °F (36.9 °C) (04/27/23 0316)  Pulse: 104 (04/27/23 0339)  Resp: 18 (04/27/23 0316)  BP: (!) 153/103 (04/27/23 0316)  SpO2: 98 % (04/27/23 0316)   Vital Signs (24h Range):  Temp:  [98.5 °F (36.9 °C)-98.7 °F (37.1 °C)] 98.5 °F (36.9 °C)  Pulse:  [] 104  Resp:  [14-31] 18  SpO2:  [88 %-98 %] 98 %  BP: (118-156)/() 153/103        There is no height or weight on file to calculate BMI.    Physical Exam  Constitutional:       General: She is not in acute distress.     Appearance: Normal appearance. She is ill-appearing and toxic-appearing.   HENT:      Head: Normocephalic and atraumatic.      Mouth/Throat:      Mouth: Mucous membranes are moist.      Pharynx: Oropharynx is clear.   Eyes:      Pupils: Pupils are equal, round, and reactive to light.   Neck:      Comments: JVP to above the angle of the mandible, difficult to assess due to increased work of breathing  Cardiovascular:      Rate and Rhythm: Tachycardia present.      Pulses: Normal pulses.      Heart sounds: Normal heart sounds.   Pulmonary:      Effort: Respiratory distress present.      Breath sounds: Wheezing and rales present.   Abdominal:      General: Abdomen is flat.  There is no distension.      Tenderness: There is no abdominal tenderness. There is no guarding.   Musculoskeletal:      Cervical back: Normal range of motion.      Right lower leg: No edema.      Left lower leg: No edema.   Skin:     Capillary Refill: Capillary refill takes 2 to 3 seconds.   Neurological:      General: No focal deficit present.      Mental Status: She is alert and oriented to person, place, and time.         CRANIAL NERVES     CN III, IV, VI   Pupils are equal, round, and reactive to light.     Significant Labs: All pertinent labs within the past 24 hours have been reviewed.  ABGs: No results for input(s): PH, PCO2, HCO3, POCSATURATED, BE, TOTALHB, COHB, METHB, O2HB, POCFIO2, PO2 in the last 48 hours.  BMP:   Recent Labs   Lab 04/26/23  1342   *      K 4.8      CO2 23   BUN 81*   CREATININE 3.0*   CALCIUM 9.7     CBC:   Recent Labs   Lab 04/26/23  1342   WBC 6.88   HGB 11.0*   HCT 39.7        CMP:   Recent Labs   Lab 04/26/23  1342      K 4.8      CO2 23   *   BUN 81*   CREATININE 3.0*   CALCIUM 9.7   PROT 6.6   ALBUMIN 3.9   BILITOT 3.2*   ALKPHOS 63   AST 24   ALT 39   ANIONGAP 13     Lactic Acid:   Recent Labs   Lab 04/26/23  1341   LACTATE 2.1     Lipase: No results for input(s): LIPASE in the last 48 hours.  Magnesium: No results for input(s): MG in the last 48 hours.  TSH:   Recent Labs   Lab 01/04/23  0904   TSH 1.407     Urine Studies:   Recent Labs   Lab 04/26/23  1710   COLORU Yellow   APPEARANCEUA Clear   PHUR 5.0   SPECGRAV 1.020   PROTEINUA 3+*   GLUCUA Trace*   KETONESU Negative   BILIRUBINUA Negative   OCCULTUA 3+*   NITRITE Negative   UROBILINOGEN Negative   LEUKOCYTESUR Negative   RBCUA 0   WBCUA 5   BACTERIA Moderate*   HYALINECASTS 1       Significant Imaging: I have reviewed all pertinent imaging results/findings within the past 24 hours.  I have reviewed and interpreted all pertinent imaging results/findings within the past 24  hours.    Assessment/Plan:     Asymptomatic bacteriuria  Patient with decreased urination but denies any overt dysuria, discharge, or hematuria  Bacteria on urinalysis  Will start on empiric coverage for UTI with rocephin      COPD exacerbation  2/27/2023 PFT's read as reduced FEV1/SVC ratio (66%) and low FEF 25-75 suggest mild obstruction on spirometry  Lung volumes show moderate restriction is present, likely OHS  DLCO moderately decreased, suspect component of CHF and volume overload  Patient has wheezes on admission, CXR shows interstitial infiltrates  Will give duoneb treatments as needed  Abx with rocephin and azithromycin      Pulmonary HTN  Unclear etiology of pulmonary hypertension but likely WHO group 2  Last RHC on 3/29/23 shows pre and post capillary PH    Acute decompensated heart failure  -NICM, HFrEF EF of 15%, LVEDD 7.07 cm per previous echo 3/23/23/  -GDMT: metoprolol succinate 50mg qd, entresto 49-51 BID, Jardiance, isordil 30 TID.   -Home Diuretic: bumex 1mg BID  -Volume overloaded on exam, will give lasix 80mg IV x 1 and start on lasix gtt at 20mg/hr and monitor twice daily chemistries for improvement, likely cardiorenal syndrome  -Strict I/Os, daily weight  -Last RHC 3/29/23 RA 9, PCWP 16, PA 58/29, meann PA 39, PA sat 45%, AO sat 90% on RA, PVR 5.6 BOLDEN, CO/CI 4.1/2.03, SVR 1151, BP 88/58 , MAP 68, HR 75 SR Hb 10.7 Jayson 3.2  -Presented to selection committee 3/9/2022 declined due to renal function, lung function, and difficulty with consistent follow up with the HTS team           Stage 3b chronic kidney disease  Baseline Cr of 2.2, 3.0 on admission  Presumed cardiorenal syndrome, will diurese and monitor for improvement in renal indices  Appears volume overloaded on exam  Strict I/Os, daily weights      Elevated troponin  Troponin leak likely secondary to ADHF, patient reports chest pain intermittently  EKG does not show any abnormalities  Trend troponin, low suspicion for ACS at this time but  will monitor closely        VTE Risk Mitigation (From admission, onward)         Ordered     IP VTE HIGH RISK PATIENT  Once         04/27/23 0417     Place sequential compression device  Until discontinued         04/27/23 0417                           Trell Urias MD  Department of Hospital Medicine  [facility] - Cardiology Stepdown

## 2023-04-27 NOTE — ASSESSMENT & PLAN NOTE
Baseline Cr of 2.2, 3.0 on admission  Presumed cardiorenal syndrome, will diurese and monitor for improvement in renal indices  Appears volume overloaded on exam  Strict I/Os, daily weights

## 2023-04-27 NOTE — ASSESSMENT & PLAN NOTE
2/27/2023 PFT's read as reduced FEV1/SVC ratio (66%) and low FEF 25-75 suggest mild obstruction on spirometry  Lung volumes show moderate restriction is present, likely OHS  DLCO moderately decreased, suspect component of CHF and volume overload  Patient has wheezes on admission, CXR shows interstitial infiltrates  Will give duoneb treatments as needed  Abx with rocephin and azithromycin

## 2023-04-27 NOTE — PLAN OF CARE
SW spoke to Pt at bedside for initial discharge planning assessment. Pt lives with  ex-spouse, and grandchild. Family member will transport Pt and assist after D/C. SW is following for post-acute planning needs.      [place]a [facility] [place]  [email or web address]    Arie Vazquez - Cardiology Stepdown  Initial Discharge Assessment       Primary Care Provider: Cristobal Ann MD    Admission Diagnosis: Acute CHF [I50.9]    Admission Date: 4/27/2023  Expected Discharge Date: 4/29/2023    Discharge Barriers Identified: None    Payor: MEDICAID / Plan: [employer] [facility] / Product Type: Managed Medicaid /     Extended Emergency Contact Information  Primary Emergency Contact: Jaye Baum  Address: [address] [address] [facility]  Home Phone: [phone]  Relation: Sister  Secondary Emergency Contact: Jeanie Jaimes  Mobile Phone: [phone]  Relation: Sister    Discharge Plan A: Home with family  Discharge Plan B: Home with family      [id number] [place] - [address]  [address]  [place] [address]  Phone: [phone] Fax: [phone]    [facility] [address] [place] [address]  Phone: [phone] Fax: [phone]      Initial Assessment (most recent)       Adult Discharge Assessment - 04/27/23 1709          Discharge Assessment    Do you currently have service(s) that help you manage your care at home? No     Do you take prescription medications? Yes     Do you have prescription coverage? Yes     Do you have any problems affording any of your prescribed medications? No     Is the patient taking medications as prescribed? yes     Who is going to help you get home at discharge? Family member will be available     How do you get to doctors appointments? family or friend will provide     Are you on dialysis? No     Do you take coumadin? No     Discharge Plan A Home with family     Discharge Plan B Home with family      DME Needed Upon Discharge  other (see comments)   TBD    Discharge Plan discussed with: Patient     Discharge Barriers Identified None        Housing Stability    In the last 12 months, was there a time when you were not able to pay the mortgage or rent on time? No     In the last 12 months, how many places have you lived? 1     In the last 12 months, was there a time when you did not have a steady place to sleep or slept in a shelter (including now)? No        Transportation Needs    In the past 12 months, has lack of transportation kept you from medical appointments or from getting medications? No     In the past 12 months, has lack of transportation kept you from meetings, work, or from getting things needed for daily living? No        Food Insecurity    Within the past 12 months, you worried that your food would run out before you got the money to buy more. Never true     Within the past 12 months, the food you bought just didn't last and you didn't have money to get more. Never true        OTHER    Name(s) of People in Home Granddaughter and ex spouse

## 2023-04-27 NOTE — ED NOTES
EMS HERE TO TRANSPORT PATIENT TO [facility] ROOM 300. PATIENT ON EMS STRETCHER BREATHS EQUAL AND UNLABORED. PATIENT IN NO ACUTE DISTRESS. REPORT CALLED TO [facility].

## 2023-04-27 NOTE — SUBJECTIVE & OBJECTIVE
Past Medical History:   Diagnosis Date    Anemia     Arthritis     Atrial fibrillation     OAC    Chronic respiratory failure with hypoxia, on home oxygen therapy     2L with activity, off at rest.  Per Pulm  no overt evidence of ILD or COPD on PFTs and CT to explain O2 needs.    CKD (chronic kidney disease), stage IV 05/08/2018    Congestive heart failure     s/p AICD placement,    Deep vein thrombosis     Depression     elevated bilirubin d/t Gilbert's syndrome     confirmed by [place] genetic testing, evaluated by hepatology    Encounter for blood transfusion     GERD (gastroesophageal reflux disease)     Hypertension     Pheochromocytoma, malignant     Right kidney mass     Sleep apnea     Thalassemia trait, alpha     Thyroid disease     Type 2 diabetes mellitus with hyperglycemia, without long-term current use of insulin 08/13/2020       Past Surgical History:   Procedure Laterality Date    APPENDECTOMY      BONE MARROW BIOPSY      CARDIAC DEFIBRILLATOR PLACEMENT Left 12/2016    CARDIAC ELECTROPHYSIOLOGY MAPPING AND ABLATION      CARDIAC ELECTROPHYSIOLOGY MAPPING AND ABLATION      COLONOSCOPY N/A 5/6/2022    Procedure: COLONOSCOPY;  Surgeon: Arely Betancourt MD;  Location: [facility] ([address] [place] [address]);  Service: Endoscopy;  Laterality: N/A;  heart transplant candidate/ EF 25% - 2nd floor/ defib - Biotronik - [facility]  Eliquis - per Dr. Cortez with [facility], Pt ok to hold Eliquis x 2 days prior-see media tab-outside correspondence dated 12/30/21  - [facility]  verbal instructions/portal instructions/email instructions - s    EYE SURGERY      due to running tears    FRACTURE SURGERY Left     hand 5th digit    HYSTERECTOMY      KNEE SURGERY Left 2016    hematoma    LIVER BIOPSY  10/24/2018    Minimal steatosis, predominantly macrovesicular, 1%, Minimal nonspecific portal inflammation, no fibrosis. No findings on biopsy to explain elevated bilirubin levels. Could be d/t Gilbert's =?- hemolysis    RIGHT HEART CATHETERIZATION Right  "12/07/2021    Procedure: INSERTION, CATHETER, RIGHT HEART;  Surgeon: Irving Cardenas MD;  Location: [facility] CATH LAB;  Service: Cardiology;  Laterality: Right;    RIGHT HEART CATHETERIZATION Right 12/19/2022    Procedure: INSERTION, CATHETER, RIGHT HEART;  Surgeon: Burke Camilo MD;  Location: [facility] CATH LAB;  Service: Cardiology;  Laterality: Right;    RIGHT HEART CATHETERIZATION Right 3/29/2023    Procedure: INSERTION, CATHETER, RIGHT HEART;  Surgeon: Katie Liriano DO;  Location: [facility] CATH LAB;  Service: Cardiology;  Laterality: Right;    TRANSJUGULAR BIOPSY OF LIVER N/A 10/24/2018    Procedure: BIOPSY, LIVER, TRANSJUGULAR APPROACH;  Surgeon: [facility] Diagnostic Provider;  Location: [facility] OR [address] [place] [address];  Service: Radiology;  Laterality: N/A;       Review of patient's allergies indicates:   Allergen Reactions    Penicillins Hives and Other (See Comments)    Iodinated contrast media Nausea And Vomiting    Oxycodone-acetaminophen Other (See Comments)     Nausea, Dizziness, Anxiety.  "I don't like how it makes me feel."   Given Hydromorphone 0.5mg IVP  Without problems.  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 Metabolism of other drugs not listed here may also be impacted by UGT1A1 enzyme activity.       Tramadol Nausea And Vomiting and Other (See Comments)     Other reaction(s): Other (See Comments)    Valsartan Other (See Comments)       Current " Facility-Administered Medications on File Prior to Encounter   Medication    0.9%  NaCl infusion    [COMPLETED] 0.9%  NaCl infusion    [COMPLETED] aspirin tablet 325 mg    [COMPLETED] atorvastatin tablet 40 mg    [COMPLETED] furosemide injection 40 mg    [COMPLETED] nitroGLYCERIN 2% TD oint ointment 0.5 inch    [COMPLETED] ondansetron injection 4 mg    [COMPLETED] pantoprazole EC tablet 40 mg    vancomycin in dextrose 5 % 1 gram/250 mL IVPB 1,000 mg    [DISCONTINUED] albuterol-ipratropium 2.5 mg-0.5 mg/3 mL nebulizer solution 3 mL     Current Outpatient Medications on File Prior to Encounter   Medication Sig    albuterol-ipratropium (DUO-NEB) 2.5 mg-0.5 mg/3 mL nebulizer solution Take 3 mLs by nebulization 2 (two) times a day.    allopurinoL (ZYLOPRIM) 100 MG tablet Take 1 tablet (100 mg total) by mouth daily as needed (gout attack).    ALPRAZolam (XANAX) 2 MG Tab Take 2 mg by mouth 2 (two) times daily.    amiodarone (PACERONE) 200 MG Tab Take 200 mg by mouth.    atorvastatin (LIPITOR) 40 MG tablet Take 1 tablet (40 mg total) by mouth every evening.    b complex vitamins tablet Take 1 tablet by mouth once daily.    blood sugar diagnostic (ACCU-CHEK GUIDE TEST STRIPS) Strp 1 strip by Other route 3 (three) times daily.    bumetanide (BUMEX) 2 MG tablet Take 1 tablet (2 mg total) by mouth 2 (two) times a day.    cetirizine (ZYRTEC) 10 MG tablet Take 10 mg by mouth daily as needed for Allergies.    chlorthalidone (HYGROTEN) 25 MG Tab Take 1 tablet (25 mg total) by mouth daily as needed. (Patient not taking: Reported on 4/11/2023)    colchicine (COLCRYS) 0.6 mg tablet Take 1 tablet (0.6 mg total) by mouth 2 (two) times daily. for 7 days (Patient not taking: Reported on 4/11/2023)    diclofenac sodium (VOLTAREN) 1 % Gel APPLY 2 G TOPICALLY 3 (THREE) TIMES DAILY AS NEEDED (PAIN).    ELIQUIS 5 mg Tab TAKE 1 TABLET (5 MG TOTAL) BY MOUTH 2 (TWO) TIMES DAILY.    empagliflozin (JARDIANCE) 10 mg tablet TAKE 1 TABLET BY MOUTH  EVERY DAY    ergocalciferol (ERGOCALCIFEROL) 50,000 unit Cap Take 1 capsule (50,000 Units total) by mouth every 7 days. (Patient taking differently: Take 50,000 Units by mouth every Saturday.)    fluticasone propionate (FLONASE) 50 mcg/actuation nasal spray 2 sprays by Each Nostril route daily as needed for Rhinitis.     isosorbide dinitrate (ISORDIL) 30 MG Tab Take 1 tablet (30 mg total) by mouth 3 (three) times daily.    lancets (ACCU-CHEK SOFTCLIX LANCETS) Misc 1 Device by Misc.(Non-Drug; Combo Route) route 3 (three) times daily.    LIDOcaine (LIDODERM) 5 % Place 1 patch onto the skin daily as needed (pain). Remove & Discard patch within 12 hours or as directed by MD    metoprolol succinate (TOPROL-XL) 50 MG 24 hr tablet Take 1 tablet (50 mg total) by mouth once daily.    mirtazapine (REMERON) 7.5 MG Tab Take 1 tablet (7.5 mg total) by mouth every evening.    mometasone-formoterol (DULERA) 200-5 mcg/actuation inhaler Inhale 2 puffs into the lungs 2 (two) times daily. Controller    ondansetron (ZOFRAN-ODT) 4 MG TbDL TAKE 1 TABLET (4 MG TOTAL) BY MOUTH 2 (TWO) TIMES DAILY.    pantoprazole (PROTONIX) 40 MG tablet TAKE 1 TABLET BY MOUTH DAILY IN THE MORNING    potassium chloride (MICRO-K) 10 MEQ CpSR Take 1 capsule (10 mEq total) by mouth once daily.    predniSONE (DELTASONE) 20 MG tablet Take 1 tablet (20 mg total) by mouth 2 (two) times daily. (Patient not taking: Reported on 4/11/2023)    pregabalin (LYRICA) 25 MG capsule Take 1 capsule (25 mg total) by mouth 2 (two) times daily.    promethazine-codeine 6.25-10 mg/5 ml (PHENERGAN WITH CODEINE) 6.25-10 mg/5 mL syrup TAKE 5 MLS BY MOUTH EVERY 4-6 HOURS AS NEEDED (Patient not taking: Reported on 4/11/2023)    sacubitriL-valsartan (ENTRESTO) 49-51 mg per tablet TAKE 1 TABLET BY MOUTH TWICE A DAY    semaglutide 0.25 mg or 0.5 mg (2 mg/3 mL) PnIj Inject 0.5 mg into the skin every 7 days.    tiotropium bromide (SPIRIVA RESPIMAT) 1.25 mcg/actuation inhaler Inhale 2 puffs  into the lungs once daily. Controller    traZODone (DESYREL) 50 MG tablet Take 25 mg by mouth nightly as needed.    VENTOLIN HFA 90 mcg/actuation inhaler Inhale 2 puffs into the lungs every 6 (six) hours as needed for Shortness of Breath or Wheezing.    [DISCONTINUED] ferrous sulfate 325 (65 FE) MG EC tablet Take 1 tablet (325 mg total) by mouth once daily.    [DISCONTINUED] ondansetron (ZOFRAN-ODT) 8 MG TbDL Take 1 tablet (8 mg total) by mouth every 12 (twelve) hours as needed (nausea).    [DISCONTINUED] scopolamine (TRANSDERM-SCOP) 1.3-1.5 mg (1 mg over 3 days) Place 1 patch onto the skin every 72 hours as needed (nausea).     Family History       Problem Relation (Age of Onset)    Cancer Mother    Cirrhosis Brother    Diabetes Brother    Heart attack Father    Heart disease Father, Sister, Brother, Sister, Brother    Hypertension Father, Brother          Tobacco Use    Smoking status: Never    Smokeless tobacco: Never   Substance and Sexual Activity    Alcohol use: Yes     Comment: up to 1 yr ago drank 2-3 drinks on occasion but sporadic    Drug use: No    Sexual activity: Yes     Partners: Male     Review of Systems   Constitutional:  Positive for activity change, appetite change and fever. Negative for unexpected weight change.   Respiratory:  Positive for cough, chest tightness, shortness of breath and wheezing.    Cardiovascular:  Positive for chest pain, palpitations and leg swelling.   Gastrointestinal:  Positive for abdominal distention, abdominal pain, constipation, nausea and vomiting. Negative for diarrhea.   Genitourinary:  Positive for decreased urine volume.   Musculoskeletal:  Positive for arthralgias and joint swelling.   Neurological:  Positive for weakness and headaches. Negative for syncope.   Objective:     Vital Signs (Most Recent):  Temp: 98.5 °F (36.9 °C) (04/27/23 0316)  Pulse: 104 (04/27/23 0339)  Resp: 18 (04/27/23 0316)  BP: (!) 153/103 (04/27/23 0316)  SpO2: 98 % (04/27/23 0316)   Vital  Signs (24h Range):  Temp:  [98.5 °F (36.9 °C)-98.7 °F (37.1 °C)] 98.5 °F (36.9 °C)  Pulse:  [] 104  Resp:  [14-31] 18  SpO2:  [88 %-98 %] 98 %  BP: (118-156)/() 153/103        There is no height or weight on file to calculate BMI.    Physical Exam  Constitutional:       General: She is not in acute distress.     Appearance: Normal appearance. She is ill-appearing and toxic-appearing.   HENT:      Head: Normocephalic and atraumatic.      Mouth/Throat:      Mouth: Mucous membranes are moist.      Pharynx: Oropharynx is clear.   Eyes:      Pupils: Pupils are equal, round, and reactive to light.   Neck:      Comments: JVP to above the angle of the mandible, difficult to assess due to increased work of breathing  Cardiovascular:      Rate and Rhythm: Tachycardia present.      Pulses: Normal pulses.      Heart sounds: Normal heart sounds.   Pulmonary:      Effort: Respiratory distress present.      Breath sounds: Wheezing and rales present.   Abdominal:      General: Abdomen is flat. There is no distension.      Tenderness: There is no abdominal tenderness. There is no guarding.   Musculoskeletal:      Cervical back: Normal range of motion.      Right lower leg: No edema.      Left lower leg: No edema.   Skin:     Capillary Refill: Capillary refill takes 2 to 3 seconds.   Neurological:      General: No focal deficit present.      Mental Status: She is alert and oriented to person, place, and time.         CRANIAL NERVES     CN III, IV, VI   Pupils are equal, round, and reactive to light.     Significant Labs: All pertinent labs within the past 24 hours have been reviewed.  ABGs: No results for input(s): PH, PCO2, HCO3, POCSATURATED, BE, TOTALHB, COHB, METHB, O2HB, POCFIO2, PO2 in the last 48 hours.  BMP:   Recent Labs   Lab 04/26/23  1342   *      K 4.8      CO2 23   BUN 81*   CREATININE 3.0*   CALCIUM 9.7     CBC:   Recent Labs   Lab 04/26/23  1342   WBC 6.88   HGB 11.0*   HCT 39.7   PLT  173     CMP:   Recent Labs   Lab 04/26/23  1342      K 4.8      CO2 23   *   BUN 81*   CREATININE 3.0*   CALCIUM 9.7   PROT 6.6   ALBUMIN 3.9   BILITOT 3.2*   ALKPHOS 63   AST 24   ALT 39   ANIONGAP 13     Lactic Acid:   Recent Labs   Lab 04/26/23  1341   LACTATE 2.1     Lipase: No results for input(s): LIPASE in the last 48 hours.  Magnesium: No results for input(s): MG in the last 48 hours.  TSH:   Recent Labs   Lab 01/04/23  0904   TSH 1.407     Urine Studies:   Recent Labs   Lab 04/26/23  1710   COLORU Yellow   APPEARANCEUA Clear   PHUR 5.0   SPECGRAV 1.020   PROTEINUA 3+*   GLUCUA Trace*   KETONESU Negative   BILIRUBINUA Negative   OCCULTUA 3+*   NITRITE Negative   UROBILINOGEN Negative   LEUKOCYTESUR Negative   RBCUA 0   WBCUA 5   BACTERIA Moderate*   HYALINECASTS 1       Significant Imaging: I have reviewed all pertinent imaging results/findings within the past 24 hours.  I have reviewed and interpreted all pertinent imaging results/findings within the past 24 hours.

## 2023-04-27 NOTE — SUBJECTIVE & OBJECTIVE
Past Medical History:   Diagnosis Date    Anemia     Arthritis     Atrial fibrillation     OAC    Chronic respiratory failure with hypoxia, on home oxygen therapy     2L with activity, off at rest.  Per Pulm  no overt evidence of ILD or COPD on PFTs and CT to explain O2 needs.    CKD (chronic kidney disease), stage IV 05/08/2018    Congestive heart failure     s/p AICD placement,    Deep vein thrombosis     Depression     elevated bilirubin d/t Gilbert's syndrome     confirmed by [facility] genetic testing, evaluated by hepatology    Encounter for blood transfusion     GERD (gastroesophageal reflux disease)     Hypertension     Pheochromocytoma, malignant     Right kidney mass     Sleep apnea     Thalassemia trait, alpha     Thyroid disease     Type 2 diabetes mellitus with hyperglycemia, without long-term current use of insulin 08/13/2020       Past Surgical History:   Procedure Laterality Date    APPENDECTOMY      BONE MARROW BIOPSY      CARDIAC DEFIBRILLATOR PLACEMENT Left 12/2016    CARDIAC ELECTROPHYSIOLOGY MAPPING AND ABLATION      CARDIAC ELECTROPHYSIOLOGY MAPPING AND ABLATION      COLONOSCOPY N/A 5/6/2022    Procedure: COLONOSCOPY;  Surgeon: Arely Betancourt MD;  Location: [facility] ([address] [place] [address]);  Service: Endoscopy;  Laterality: N/A;  heart transplant candidate/ EF 25% - 2nd floor/ defib - Biotronik - ERW  Eliquis - per Dr. Cortez with [facility], Pt ok to hold Eliquis x 2 days prior-see media tab-outside correspondence dated 12/30/21  - ERW  verbal instructions/portal instructions/email instructions - s    EYE SURGERY      due to running tears    FRACTURE SURGERY Left     hand 5th digit    HYSTERECTOMY      KNEE SURGERY Left 2016    hematoma    LIVER BIOPSY  10/24/2018    Minimal steatosis, predominantly macrovesicular, 1%, Minimal nonspecific portal inflammation, no fibrosis. No findings on biopsy to explain elevated bilirubin levels. Could be d/t Gilbert's =?- hemolysis    RIGHT HEART CATHETERIZATION Right  "12/07/2021    Procedure: INSERTION, CATHETER, RIGHT HEART;  Surgeon: Irving Cardenas MD;  Location: [facility] CATH LAB;  Service: Cardiology;  Laterality: Right;    RIGHT HEART CATHETERIZATION Right 12/19/2022    Procedure: INSERTION, CATHETER, RIGHT HEART;  Surgeon: Burke Camilo MD;  Location: [facility] CATH LAB;  Service: Cardiology;  Laterality: Right;    RIGHT HEART CATHETERIZATION Right 3/29/2023    Procedure: INSERTION, CATHETER, RIGHT HEART;  Surgeon: Katie Liriano DO;  Location: [facility] CATH LAB;  Service: Cardiology;  Laterality: Right;    TRANSJUGULAR BIOPSY OF LIVER N/A 10/24/2018    Procedure: BIOPSY, LIVER, TRANSJUGULAR APPROACH;  Surgeon: [facility] Provider;  Location: [facility] OR [address] [place] [address];  Service: Radiology;  Laterality: N/A;       Review of patient's allergies indicates:   Allergen Reactions    Penicillins Hives and Other (See Comments)    Iodinated contrast media Nausea And Vomiting    Oxycodone-acetaminophen Other (See Comments)     Nausea, Dizziness, Anxiety.  "I don't like how it makes me feel."   Given Hydromorphone 0.5mg IVP  Without problems.  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 Metabolism of other drugs not listed here may also be impacted by UGT1A1 enzyme activity.       Tramadol Nausea And Vomiting and Other (See Comments)     Other reaction(s): Other (See Comments)    Valsartan Other (See Comments)       Current " Facility-Administered Medications on File Prior to Encounter   Medication    0.9%  NaCl infusion    [COMPLETED] 0.9%  NaCl infusion    [COMPLETED] aspirin tablet 325 mg    [COMPLETED] atorvastatin tablet 40 mg    [COMPLETED] furosemide injection 40 mg    [COMPLETED] nitroGLYCERIN 2% TD oint ointment 0.5 inch    [COMPLETED] ondansetron injection 4 mg    [COMPLETED] pantoprazole EC tablet 40 mg    vancomycin in dextrose 5 % 1 gram/250 mL IVPB 1,000 mg    [DISCONTINUED] albuterol-ipratropium 2.5 mg-0.5 mg/3 mL nebulizer solution 3 mL     Current Outpatient Medications on File Prior to Encounter   Medication Sig    albuterol-ipratropium (DUO-NEB) 2.5 mg-0.5 mg/3 mL nebulizer solution Take 3 mLs by nebulization 2 (two) times a day.    allopurinoL (ZYLOPRIM) 100 MG tablet Take 1 tablet (100 mg total) by mouth daily as needed (gout attack).    ALPRAZolam (XANAX) 2 MG Tab Take 2 mg by mouth 2 (two) times daily.    amiodarone (PACERONE) 200 MG Tab Take 200 mg by mouth.    atorvastatin (LIPITOR) 40 MG tablet Take 1 tablet (40 mg total) by mouth every evening.    b complex vitamins tablet Take 1 tablet by mouth once daily.    blood sugar diagnostic (ACCU-CHEK GUIDE TEST STRIPS) Strp 1 strip by Other route 3 (three) times daily.    bumetanide (BUMEX) 2 MG tablet Take 1 tablet (2 mg total) by mouth 2 (two) times a day.    cetirizine (ZYRTEC) 10 MG tablet Take 10 mg by mouth daily as needed for Allergies.    chlorthalidone (HYGROTEN) 25 MG Tab Take 1 tablet (25 mg total) by mouth daily as needed. (Patient not taking: Reported on 4/11/2023)    colchicine (COLCRYS) 0.6 mg tablet Take 1 tablet (0.6 mg total) by mouth 2 (two) times daily. for 7 days (Patient not taking: Reported on 4/11/2023)    diclofenac sodium (VOLTAREN) 1 % Gel APPLY 2 G TOPICALLY 3 (THREE) TIMES DAILY AS NEEDED (PAIN).    ELIQUIS 5 mg Tab TAKE 1 TABLET (5 MG TOTAL) BY MOUTH 2 (TWO) TIMES DAILY.    empagliflozin (JARDIANCE) 10 mg tablet TAKE 1 TABLET BY MOUTH  EVERY DAY    ergocalciferol (ERGOCALCIFEROL) 50,000 unit Cap Take 1 capsule (50,000 Units total) by mouth every 7 days. (Patient taking differently: Take 50,000 Units by mouth every Saturday.)    fluticasone propionate (FLONASE) 50 mcg/actuation nasal spray 2 sprays by Each Nostril route daily as needed for Rhinitis.     isosorbide dinitrate (ISORDIL) 30 MG Tab Take 1 tablet (30 mg total) by mouth 3 (three) times daily.    lancets (ACCU-CHEK SOFTCLIX LANCETS) Misc 1 Device by Misc.(Non-Drug; Combo Route) route 3 (three) times daily.    LIDOcaine (LIDODERM) 5 % Place 1 patch onto the skin daily as needed (pain). Remove & Discard patch within 12 hours or as directed by MD    metoprolol succinate (TOPROL-XL) 50 MG 24 hr tablet Take 1 tablet (50 mg total) by mouth once daily.    mirtazapine (REMERON) 7.5 MG Tab Take 1 tablet (7.5 mg total) by mouth every evening.    mometasone-formoterol (DULERA) 200-5 mcg/actuation inhaler Inhale 2 puffs into the lungs 2 (two) times daily. Controller    ondansetron (ZOFRAN-ODT) 4 MG TbDL TAKE 1 TABLET (4 MG TOTAL) BY MOUTH 2 (TWO) TIMES DAILY.    pantoprazole (PROTONIX) 40 MG tablet TAKE 1 TABLET BY MOUTH DAILY IN THE MORNING    potassium chloride (MICRO-K) 10 MEQ CpSR Take 1 capsule (10 mEq total) by mouth once daily.    predniSONE (DELTASONE) 20 MG tablet Take 1 tablet (20 mg total) by mouth 2 (two) times daily. (Patient not taking: Reported on 4/11/2023)    pregabalin (LYRICA) 25 MG capsule Take 1 capsule (25 mg total) by mouth 2 (two) times daily.    promethazine-codeine 6.25-10 mg/5 ml (PHENERGAN WITH CODEINE) 6.25-10 mg/5 mL syrup TAKE 5 MLS BY MOUTH EVERY 4-6 HOURS AS NEEDED (Patient not taking: Reported on 4/11/2023)    sacubitriL-valsartan (ENTRESTO) 49-51 mg per tablet TAKE 1 TABLET BY MOUTH TWICE A DAY    semaglutide 0.25 mg or 0.5 mg (2 mg/3 mL) PnIj Inject 0.5 mg into the skin every 7 days.    tiotropium bromide (SPIRIVA RESPIMAT) 1.25 mcg/actuation inhaler Inhale 2 puffs  into the lungs once daily. Controller    traZODone (DESYREL) 50 MG tablet Take 25 mg by mouth nightly as needed.    VENTOLIN HFA 90 mcg/actuation inhaler Inhale 2 puffs into the lungs every 6 (six) hours as needed for Shortness of Breath or Wheezing.    [DISCONTINUED] ferrous sulfate 325 (65 FE) MG EC tablet Take 1 tablet (325 mg total) by mouth once daily.    [DISCONTINUED] ondansetron (ZOFRAN-ODT) 8 MG TbDL Take 1 tablet (8 mg total) by mouth every 12 (twelve) hours as needed (nausea).    [DISCONTINUED] scopolamine (TRANSDERM-SCOP) 1.3-1.5 mg (1 mg over 3 days) Place 1 patch onto the skin every 72 hours as needed (nausea).     Family History       Problem Relation (Age of Onset)    Cancer Mother    Cirrhosis Brother    Diabetes Brother    Heart attack Father    Heart disease Father, Sister, Brother, Sister, Brother    Hypertension Father, Brother          Tobacco Use    Smoking status: Never    Smokeless tobacco: Never   Substance and Sexual Activity    Alcohol use: Yes     Comment: up to 1 yr ago drank 2-3 drinks on occasion but sporadic    Drug use: No    Sexual activity: Yes     Partners: Male     Review of Systems   Constitutional: Negative for chills and fever.   HENT:  Negative for congestion.    Eyes:  Negative for blurred vision and double vision.   Cardiovascular:  Positive for dyspnea on exertion, leg swelling and orthopnea. Negative for chest pain.   Respiratory:  Positive for cough and shortness of breath.    Musculoskeletal:  Negative for back pain and myalgias.   Gastrointestinal:  Negative for abdominal pain, diarrhea, hematochezia, melena, nausea and vomiting.   Genitourinary:  Negative for flank pain and frequency.   Neurological:  Negative for dizziness and headaches.   All other systems reviewed and are negative.  Objective:     Vital Signs (Most Recent):  Temp: 98.4 °F (36.9 °C) (04/27/23 0618)  Pulse: 97 (04/27/23 0800)  Resp: 18 (04/27/23 0800)  BP: 118/64 (04/27/23 0618)  SpO2: (!) 94 %  (04/27/23 0800)   Vital Signs (24h Range):  Temp:  [98.4 °F (36.9 °C)-98.7 °F (37.1 °C)] 98.4 °F (36.9 °C)  Pulse:  [] 97  Resp:  [14-31] 18  SpO2:  [88 %-98 %] 94 %  BP: (118-156)/() 118/64     Weight: 89.6 kg (197 lb 8.5 oz)  Body mass index is 31.88 kg/m².    SpO2: (!) 94 %         Intake/Output Summary (Last 24 hours) at 4/27/2023 0849  Last data filed at 4/27/2023 0710  Gross per 24 hour   Intake --   Output 600 ml   Net -600 ml       Lines/Drains/Airways       Peripheral Intravenous Line  Duration                  Peripheral IV - Single Lumen 04/26/23 0200 20 G Right Hand 1 day                    Physical Exam  Vitals and nursing note reviewed.   Constitutional:       General: She is not in acute distress.     Appearance: Normal appearance. She is obese. She is ill-appearing (chronic).   HENT:      Head: Normocephalic and atraumatic.   Eyes:      Extraocular Movements: Extraocular movements intact.      Pupils: Pupils are equal, round, and reactive to light.   Cardiovascular:      Rate and Rhythm: Normal rate and regular rhythm.      Pulses: Normal pulses.   Pulmonary:      Effort: Pulmonary effort is normal. No respiratory distress.      Breath sounds: Wheezing present.   Abdominal:      General: There is no distension.      Palpations: Abdomen is soft.      Tenderness: There is no abdominal tenderness.   Musculoskeletal:      Right lower leg: Edema present.      Left lower leg: Edema present.   Skin:     General: Skin is warm and dry.   Neurological:      General: No focal deficit present.      Mental Status: She is alert and oriented to person, place, and time.       Significant Labs: CMP   Recent Labs   Lab 04/26/23  1342 04/27/23  0428    139   K 4.8 5.0    104   CO2 23 21*   * 149*   BUN 81* 78*   CREATININE 3.0* 2.6*   CALCIUM 9.7 9.3   PROT 6.6 5.7*   ALBUMIN 3.9 3.3*   BILITOT 3.2* 3.3*   ALKPHOS 63 60   AST 24 22   ALT 39 29   ANIONGAP 13 14    and CBC   Recent Labs    Lab 04/26/23  1342 04/27/23  0428   WBC 6.88 5.80   HGB 11.0* 10.4*   HCT 39.7 37.0    157       Significant Imaging: X-Ray: CXR: X-Ray Chest 1 View (CXR): No results found for this visit on 04/27/23.

## 2023-04-27 NOTE — ASSESSMENT & PLAN NOTE
57 YOF with hx of HFrEF 15%, NICM w/ ICD, COPD, CKD presenting with one week of SOB, LE edema, and distension along with worsening productive cough admitted for ADHF with COPD exacerbation. Likely trigger for ADHF is COPD exacerbation. Could also be recent steroid use from gout treatment. No signs of cardiogenic shock. Diuresing well on lasix gtt.     Recommendations  - Continue Lasix drip 20 mg/hr and titrate to UOP 2-3L per day  - Check BMP twice a day on lasix drip  - CrCl too low for aldactone and Jardiance  - K>4, Mg >2

## 2023-04-27 NOTE — ASSESSMENT & PLAN NOTE
Patient with decreased urination but denies any overt dysuria, discharge, or hematuria  Bacteria on urinalysis  Will start on empiric coverage for UTI with rocephin

## 2023-04-27 NOTE — ASSESSMENT & PLAN NOTE
Troponin leak likely secondary to ADHF, patient reports chest pain intermittently  EKG does not show any abnormalities  Trend troponin, low suspicion for ACS at this time but will monitor closely

## 2023-04-27 NOTE — PROVIDER PROGRESS NOTES - EMERGENCY DEPT.
Emergency Room Physician       57-year-old female presented with nausea vomiting & fatigue today in the ER  A very complex patient, she suffers from severe pulmonary hypertension  Patient has been followed by the heart transplant team at [facility]  Patient had chest pressure today, headache, SOB with nausea vomiting    Patient was found to have a troponin of 0.900 with a BNP greater than 5000  Patient also with increased kidney injury with a creatinine of 3.0 this morning  Difficult picture, fluid overload with increased creatinine, diuresing in the ER    Physical Exam  -- Nursing note and vitals reviewed  -- Constitutional: Appears well-developed and well-nourished  -- Head: Atraumatic. Normocephalic. No obvious abnormality  -- Eyes: Pupils are equal and reactive to light. Normal conjunctiva and lids  -- Cardiac: Normal rate, regular rhythm and normal heart sounds  -- Respiratory: Rhonchi at the bilateral bases with coarse breath sounds  -- Gastrointestinal: Soft, no tenderness. Normal bowel sounds. Normal liver edge  -- Musculoskeletal: Normal range of motion, no effusions. Joints stable   -- Neurological: No focal deficits. Showed good interaction with staff  -- Vascular: Posterior tibial, dorsalis pedis and radial pulses 2+ bilaterally      Patient given IV Lasix in the emergency room for diuresis  Breathing treatments in the emergency room for shortness of breath  Aspirin given, nitro paste given for likely demand ischemia  Differential included NSTEMI with CHF versus demand ischemia  Patient was discussed at length with the transplant heart team   We will transfer to [facility] when a bed is available/ASAP  Will perform serial troponins while waiting in the ER for a bed    Critical Care ED Physician Time (minutes):  -- Performed by: Adriano Low M.D.  -- Date/Time: 10:56 PM 4/26/2023   -- Direct Patient Care (Face Time): 5  -- Additional History from Records or Taking Additional History: 5  -- Ordering,  Reviewing, and Interpreting Diagnostic Studies: 5  -- Total Time in Documentation: 11  -- Consultation with Other Physicians: 5  -- Consultation with Family Related to Condition: 0  -- Total Critical Care Time: 31  -- Critical care was necessary to treat NSTEMI/to me ischemia/CHF  -- Critical care was time spent personally by me on the following activities:   -- examination of patient, ordering and performing treatments   -- review of radiographic studies, re-evaluation of pt's condition  -- review of labs and evaluation of response to treatment        Adriano Low M.D. 10:57 PM 4/26/2023

## 2023-04-27 NOTE — HPI
"Hafsa Hawley is a 57 YOF with hx of HTN, COPD, CKD, NICM s/p ICD EF 15%, AF on Eliquis, Gilbert's syndrome, pHTN, and MELISSA presenting with worsening SOB and fatigue found to be in ADHF and COPD exacerbation. Pt reports that she has been having worsening fatigue, SOB,worsened orthopnea, abdominal distension,  and foot edema over the last week. She reports that even though she has been compliant with her bumex, she has had reduced urinary output as well. She has a chronic cough which has worsened with increased phlegm production as well. 2 weeks ago, she was treated for gout in her knees with steroids and a "pain shot". She reports increased UOP and symptomatic improvement since initiation of lasix gtt.  She denies fevers, chills, chest pain, palpitations. Pt has been evaluated for a Noah in the past but has been denied due to poor medical compliance and other significant co-morbidities such as renal and lung disease.     "

## 2023-04-27 NOTE — ED NOTES
"Rhythm change noted on continuous cardiac monitor. Patient reports feeling "palpitations that woke me up from sleeping again". AAOx4 at this time. NO CURRENT PAIN AT THIS TIME. Rhythm strip printed and given to MD to review.   Transfer center notified.   "

## 2023-04-28 ENCOUNTER — TELEPHONE (OUTPATIENT)
Dept: FAMILY MEDICINE | Facility: CLINIC | Age: 58
End: 2023-04-28
Payer: MEDICAID

## 2023-04-28 DIAGNOSIS — I50.42 CHRONIC COMBINED SYSTOLIC AND DIASTOLIC HEART FAILURE: Primary | ICD-10-CM

## 2023-04-28 LAB
ALBUMIN SERPL BCP-MCNC: 3.2 G/DL (ref 3.5–5.2)
ALP SERPL-CCNC: 60 U/L (ref 55–135)
ALT SERPL W/O P-5'-P-CCNC: 30 U/L (ref 10–44)
ANION GAP SERPL CALC-SCNC: 13 MMOL/L (ref 8–16)
AST SERPL-CCNC: 20 U/L (ref 10–40)
BILIRUB SERPL-MCNC: 1.9 MG/DL (ref 0.1–1)
BUN SERPL-MCNC: 81 MG/DL (ref 6–20)
CALCIUM SERPL-MCNC: 9.1 MG/DL (ref 8.7–10.5)
CHLORIDE SERPL-SCNC: 104 MMOL/L (ref 95–110)
CO2 SERPL-SCNC: 24 MMOL/L (ref 23–29)
CREAT SERPL-MCNC: 2.6 MG/DL (ref 0.5–1.4)
EST. GFR  (NO RACE VARIABLE): 20.9 ML/MIN/1.73 M^2
GLUCOSE SERPL-MCNC: 135 MG/DL (ref 70–110)
INR PPP: 1 (ref 0.8–1.2)
MAGNESIUM SERPL-MCNC: 2.4 MG/DL (ref 1.6–2.6)
PHOSPHATE SERPL-MCNC: 5.4 MG/DL (ref 2.7–4.5)
POTASSIUM SERPL-SCNC: 4.1 MMOL/L (ref 3.5–5.1)
PROT SERPL-MCNC: 5.5 G/DL (ref 6–8.4)
PROTHROMBIN TIME: 11.1 SEC (ref 9–12.5)
SODIUM SERPL-SCNC: 141 MMOL/L (ref 136–145)

## 2023-04-28 PROCEDURE — 36415 COLL VENOUS BLD VENIPUNCTURE: CPT | Performed by: STUDENT IN AN ORGANIZED HEALTH CARE EDUCATION/TRAINING PROGRAM

## 2023-04-28 PROCEDURE — 63600175 PHARM REV CODE 636 W HCPCS: Performed by: STUDENT IN AN ORGANIZED HEALTH CARE EDUCATION/TRAINING PROGRAM

## 2023-04-28 PROCEDURE — 99233 PR SUBSEQUENT HOSPITAL CARE,LEVL III: ICD-10-PCS | Mod: ,,, | Performed by: HOSPITALIST

## 2023-04-28 PROCEDURE — 80053 COMPREHEN METABOLIC PANEL: CPT | Performed by: STUDENT IN AN ORGANIZED HEALTH CARE EDUCATION/TRAINING PROGRAM

## 2023-04-28 PROCEDURE — 63600175 PHARM REV CODE 636 W HCPCS: Performed by: HOSPITALIST

## 2023-04-28 PROCEDURE — 99233 SBSQ HOSP IP/OBS HIGH 50: CPT | Mod: ,,, | Performed by: HOSPITALIST

## 2023-04-28 PROCEDURE — 20600001 HC STEP DOWN PRIVATE ROOM

## 2023-04-28 PROCEDURE — 84100 ASSAY OF PHOSPHORUS: CPT | Performed by: STUDENT IN AN ORGANIZED HEALTH CARE EDUCATION/TRAINING PROGRAM

## 2023-04-28 PROCEDURE — 99232 SBSQ HOSP IP/OBS MODERATE 35: CPT | Mod: ,,, | Performed by: INTERNAL MEDICINE

## 2023-04-28 PROCEDURE — 25000003 PHARM REV CODE 250: Performed by: STUDENT IN AN ORGANIZED HEALTH CARE EDUCATION/TRAINING PROGRAM

## 2023-04-28 PROCEDURE — 25000003 PHARM REV CODE 250: Performed by: HOSPITALIST

## 2023-04-28 PROCEDURE — 85610 PROTHROMBIN TIME: CPT | Performed by: STUDENT IN AN ORGANIZED HEALTH CARE EDUCATION/TRAINING PROGRAM

## 2023-04-28 PROCEDURE — 99232 PR SUBSEQUENT HOSPITAL CARE,LEVL II: ICD-10-PCS | Mod: ,,, | Performed by: INTERNAL MEDICINE

## 2023-04-28 PROCEDURE — 83735 ASSAY OF MAGNESIUM: CPT | Performed by: STUDENT IN AN ORGANIZED HEALTH CARE EDUCATION/TRAINING PROGRAM

## 2023-04-28 RX ORDER — ESCITALOPRAM OXALATE 5 MG/1
5 TABLET ORAL DAILY
Status: DISCONTINUED | OUTPATIENT
Start: 2023-04-29 | End: 2023-05-02 | Stop reason: HOSPADM

## 2023-04-28 RX ADMIN — HYDRALAZINE HYDROCHLORIDE 25 MG: 25 TABLET, FILM COATED ORAL at 10:04

## 2023-04-28 RX ADMIN — ISOSORBIDE DINITRATE 30 MG: 20 TABLET ORAL at 08:04

## 2023-04-28 RX ADMIN — PANTOPRAZOLE SODIUM 40 MG: 40 TABLET, DELAYED RELEASE ORAL at 08:04

## 2023-04-28 RX ADMIN — POLYETHYLENE GLYCOL 3350 17 G: 17 POWDER, FOR SOLUTION ORAL at 09:04

## 2023-04-28 RX ADMIN — HYDRALAZINE HYDROCHLORIDE 25 MG: 25 TABLET, FILM COATED ORAL at 03:04

## 2023-04-28 RX ADMIN — METOPROLOL SUCCINATE 50 MG: 50 TABLET, EXTENDED RELEASE ORAL at 08:04

## 2023-04-28 RX ADMIN — HYDRALAZINE HYDROCHLORIDE 25 MG: 25 TABLET, FILM COATED ORAL at 05:04

## 2023-04-28 RX ADMIN — FUROSEMIDE 20 MG/HR: 10 INJECTION, SOLUTION INTRAMUSCULAR; INTRAVENOUS at 03:04

## 2023-04-28 RX ADMIN — HYDROCORTISONE: 25 CREAM TOPICAL at 09:04

## 2023-04-28 RX ADMIN — MIRTAZAPINE 7.5 MG: 7.5 TABLET, FILM COATED ORAL at 08:04

## 2023-04-28 RX ADMIN — ENOXAPARIN SODIUM 30 MG: 30 INJECTION SUBCUTANEOUS at 04:04

## 2023-04-28 RX ADMIN — ISOSORBIDE DINITRATE 30 MG: 20 TABLET ORAL at 03:04

## 2023-04-28 RX ADMIN — SACUBITRIL AND VALSARTAN 1 TABLET: 49; 51 TABLET, FILM COATED ORAL at 08:04

## 2023-04-28 RX ADMIN — SENNOSIDES AND DOCUSATE SODIUM 1 TABLET: 8.6; 5 TABLET ORAL at 08:04

## 2023-04-28 RX ADMIN — ATORVASTATIN CALCIUM 40 MG: 20 TABLET, FILM COATED ORAL at 08:04

## 2023-04-28 RX ADMIN — AMIODARONE HYDROCHLORIDE 200 MG: 200 TABLET ORAL at 08:04

## 2023-04-28 NOTE — CONSULTS
"Food & Nutrition  Education     Diet Education: Low sodium, fluid restriction  Time Spent: 5 minutes  Learners: Pt      Nutrition Education provided with handouts: "Heart Failure Nutrition Therapy" handout- did not want RD to review information at this 2/2 already being educated on 1/31/23. Please contact RD if any questions arise.     Comments: RD consulted for salt and fluid restriction education. Spoke with pt at bedside. Endorses fair appetite PTA with 2-3 smaller meals/day on low sodium/diabetic diet. States she does smaller meals 2/2 felling full more often. Intake now 100% with continued fair appetite. Issues with diarrhea 2/2 miralax use but no issues with n/v/chewing/swallowing. -197# and #. Noted +2 BLE edema present. No s/s of malnutrition, appears well-nourished. Educational handout left on bedside table for pt to review. LBM 4/26.     Recommend adding diabetic diet restrictions if warranted. Also recommend adding renal diet restrictions 2/2 currently abnormal lab values (Creatinine 2.6, BUN 81, GFR 20.9, and Phos 5.4).     All questions and concerns answered. Dietitian's contact information provided.         Follow-Up: Yes     Please Re-consult as needed     Thanks!   Kaila Dawson RDN,LDN  "

## 2023-04-28 NOTE — TELEPHONE ENCOUNTER
Patient requesting rx refill on Jardiance, requesting an order for bedside commode. Please advise.

## 2023-04-28 NOTE — PROGRESS NOTES
[facility] ([facility]) Team notified of pt referral via [facility] (automated inbasket notification) .    Patient screened today 4-28-23 by provider and RN for enrollment to program.      Pt was deemed not a candidate for enrollment at this time related to patient is [facility].PT seen in [facility], note to Schedulers    Pt will require additional follow up planning per primary team.     If pt status, diagnosis, or treatment plan changes , please place AMB referral to [facility] ([id number]) for HFTCC enrollment re-evalution.

## 2023-04-28 NOTE — PROGRESS NOTES
[facility]  Cardiology  Progress Note    Patient Name: Hafsa Hawley  MRN: [id number]  Admission Date: 4/27/2023  Hospital Length of Stay: 1 days  Code Status: Full Code   Attending Physician: Julia Barone MD   Primary Care Physician: Cristobal Ann MD  Expected Discharge Date: 4/29/2023  Principal Problem:Acute decompensated heart failure    Subjective:     Hospital Course:   No notes on file    Interval History: Improved SOB and edema. Still has LAGUERRE with walking to the bathroom. Diuresing well. No chest pain or SOB at rest. Still on 1L NC. No new complaints at this time.     Review of Systems   Constitutional: Negative for chills and fever.   HENT:  Negative for congestion.    Eyes:  Negative for blurred vision and double vision.   Cardiovascular:  Positive for dyspnea on exertion, leg swelling and orthopnea. Negative for chest pain.   Respiratory:  Positive for cough and shortness of breath.    Musculoskeletal:  Negative for back pain and myalgias.   Gastrointestinal:  Negative for abdominal pain, diarrhea, hematochezia, melena, nausea and vomiting.   Genitourinary:  Negative for flank pain and frequency.   Neurological:  Negative for dizziness and headaches.   All other systems reviewed and are negative.  Objective:     Vital Signs (Most Recent):  Temp: 97 °F (36.1 °C) (04/28/23 0825)  Pulse: 77 (04/28/23 0825)  Resp: 20 (04/28/23 0825)  BP: 119/81 (04/28/23 0825)  SpO2: (!) 93 % (04/28/23 0825)   Vital Signs (24h Range):  Temp:  [97 °F (36.1 °C)-97.8 °F (36.6 °C)] 97 °F (36.1 °C)  Pulse:  [77-95] 77  Resp:  [18-22] 20  SpO2:  [92 %-97 %] 93 %  BP: (117-146)/(75-86) 119/81     Weight: 89.6 kg (197 lb 8.5 oz)  Body mass index is 31.88 kg/m².     SpO2: (!) 93 %         Intake/Output Summary (Last 24 hours) at 4/28/2023 0909  Last data filed at 4/28/2023 0833  Gross per 24 hour   Intake 546 ml   Output 2500 ml   Net -1954 ml       Lines/Drains/Airways       Peripheral Intravenous Line   Duration                  Peripheral IV - Single Lumen 04/26/23 0200 20 G Right Hand 2 days                    Physical Exam  Vitals and nursing note reviewed.   Constitutional:       General: She is not in acute distress.     Appearance: Normal appearance. She is obese. She is ill-appearing (chronic).   HENT:      Head: Normocephalic and atraumatic.   Eyes:      Extraocular Movements: Extraocular movements intact.      Pupils: Pupils are equal, round, and reactive to light.   Cardiovascular:      Rate and Rhythm: Normal rate and regular rhythm.      Pulses: Normal pulses.   Pulmonary:      Effort: Pulmonary effort is normal. No respiratory distress.   Abdominal:      General: There is no distension.      Palpations: Abdomen is soft.      Tenderness: There is no abdominal tenderness.   Musculoskeletal:      Right lower leg: Edema present.      Left lower leg: Edema present.   Skin:     General: Skin is warm and dry.   Neurological:      General: No focal deficit present.      Mental Status: She is alert and oriented to person, place, and time.       Significant Labs: CMP   Recent Labs   Lab 04/26/23  1342 04/27/23  0428 04/27/23  1456 04/28/23  0238    139 139 141   K 4.8 5.0 4.5 4.1    104 104 104   CO2 23 21* 23 24   * 149* 144* 135*   BUN 81* 78* 79* 81*   CREATININE 3.0* 2.6* 2.5* 2.6*   CALCIUM 9.7 9.3 9.2 9.1   PROT 6.6 5.7*  --  5.5*   ALBUMIN 3.9 3.3*  --  3.2*   BILITOT 3.2* 3.3*  --  1.9*   ALKPHOS 63 60  --  60   AST 24 22  --  20   ALT 39 29  --  30   ANIONGAP 13 14 12 13    and CBC   Recent Labs   Lab 04/26/23  1342 04/27/23  0428   WBC 6.88 5.80   HGB 11.0* 10.4*   HCT 39.7 37.0    157       Significant Imaging: X-Ray: CXR: X-Ray Chest 1 View (CXR): No results found for this visit on 04/27/23.    Assessment and Plan:       * Acute decompensated heart failure  57 YOF with hx of HFrEF 15%, NICM w/ ICD, COPD, CKD presenting with one week of SOB, LE edema, and distension along  with worsening productive cough admitted for ADHF with COPD exacerbation. Likely trigger for ADHF is COPD exacerbation. Could also be recent steroid use from gout treatment. No signs of cardiogenic shock. Diuresing well on lasix gtt.     Recommendations  - Continue Lasix drip 20 mg/hr and titrate to UOP 2-3L per day  - Check BMP twice a day on lasix drip  - CrCl too low for aldactone and Jardiance  - K>4, Mg >2            VTE Risk Mitigation (From admission, onward)         Ordered     enoxaparin injection 30 mg  Daily         04/27/23 1055     IP VTE HIGH RISK PATIENT  Once         04/27/23 1031     Place sequential compression device  Until discontinued         04/27/23 5995                Christopher Bailey,   Cardiology  [facility]

## 2023-04-28 NOTE — SUBJECTIVE & OBJECTIVE
Interval History: Improved SOB and edema. Still has LAGUERRE with walking to the bathroom. Diuresing well. No chest pain or SOB at rest. Still on 1L NC. No new complaints at this time.     Review of Systems   Constitutional: Negative for chills and fever.   HENT:  Negative for congestion.    Eyes:  Negative for blurred vision and double vision.   Cardiovascular:  Positive for dyspnea on exertion, leg swelling and orthopnea. Negative for chest pain.   Respiratory:  Positive for cough and shortness of breath.    Musculoskeletal:  Negative for back pain and myalgias.   Gastrointestinal:  Negative for abdominal pain, diarrhea, hematochezia, melena, nausea and vomiting.   Genitourinary:  Negative for flank pain and frequency.   Neurological:  Negative for dizziness and headaches.   All other systems reviewed and are negative.  Objective:     Vital Signs (Most Recent):  Temp: 97 °F (36.1 °C) (04/28/23 0825)  Pulse: 77 (04/28/23 0825)  Resp: 20 (04/28/23 0825)  BP: 119/81 (04/28/23 0825)  SpO2: (!) 93 % (04/28/23 0825)   Vital Signs (24h Range):  Temp:  [97 °F (36.1 °C)-97.8 °F (36.6 °C)] 97 °F (36.1 °C)  Pulse:  [77-95] 77  Resp:  [18-22] 20  SpO2:  [92 %-97 %] 93 %  BP: (117-146)/(75-86) 119/81     Weight: 89.6 kg (197 lb 8.5 oz)  Body mass index is 31.88 kg/m².     SpO2: (!) 93 %         Intake/Output Summary (Last 24 hours) at 4/28/2023 0909  Last data filed at 4/28/2023 0833  Gross per 24 hour   Intake 546 ml   Output 2500 ml   Net -1954 ml       Lines/Drains/Airways       Peripheral Intravenous Line  Duration                  Peripheral IV - Single Lumen 04/26/23 0200 20 G Right Hand 2 days                    Physical Exam  Vitals and nursing note reviewed.   Constitutional:       General: She is not in acute distress.     Appearance: Normal appearance. She is obese. She is ill-appearing (chronic).   HENT:      Head: Normocephalic and atraumatic.   Eyes:      Extraocular Movements: Extraocular movements intact.       Pupils: Pupils are equal, round, and reactive to light.   Cardiovascular:      Rate and Rhythm: Normal rate and regular rhythm.      Pulses: Normal pulses.   Pulmonary:      Effort: Pulmonary effort is normal. No respiratory distress.   Abdominal:      General: There is no distension.      Palpations: Abdomen is soft.      Tenderness: There is no abdominal tenderness.   Musculoskeletal:      Right lower leg: Edema present.      Left lower leg: Edema present.   Skin:     General: Skin is warm and dry.   Neurological:      General: No focal deficit present.      Mental Status: She is alert and oriented to person, place, and time.       Significant Labs: CMP   Recent Labs   Lab 04/26/23  1342 04/27/23  0428 04/27/23  1456 04/28/23  0238    139 139 141   K 4.8 5.0 4.5 4.1    104 104 104   CO2 23 21* 23 24   * 149* 144* 135*   BUN 81* 78* 79* 81*   CREATININE 3.0* 2.6* 2.5* 2.6*   CALCIUM 9.7 9.3 9.2 9.1   PROT 6.6 5.7*  --  5.5*   ALBUMIN 3.9 3.3*  --  3.2*   BILITOT 3.2* 3.3*  --  1.9*   ALKPHOS 63 60  --  60   AST 24 22  --  20   ALT 39 29  --  30   ANIONGAP 13 14 12 13    and CBC   Recent Labs   Lab 04/26/23  1342 04/27/23  0428   WBC 6.88 5.80   HGB 11.0* 10.4*   HCT 39.7 37.0    157       Significant Imaging: X-Ray: CXR: X-Ray Chest 1 View (CXR): No results found for this visit on 04/27/23.

## 2023-04-28 NOTE — TELEPHONE ENCOUNTER
----- Message from Frank Cook sent at 2023  2:40 PM CDT -----  Contact: patient  Hafsa Hawley  MRN: [id number]  : 1965  PCP: Cristobal Ann  Home Phone      [phone]  Work Phone      Not on file.  Mobile          [phone]  Home Phone      [phone]      MESSAGE: needs refill on Jardiance sent to [facility] - has been unable to get Hydrocodone --   Some confusion with her meds -- would like order for portable commode -- please advise    Call [phone]    PCP: Seth

## 2023-04-28 NOTE — PROGRESS NOTES
[facility] Hospital Medicine  Progress Note    Primary Team: [facility]  Admit Date: 4/27/2023    Subjective:        Interval History:  Still with dyspnea. Continue Lasix gtt.       ROS:  As per HPI  General: no fever, no chills, no weight loss, no fatigue  Cardiovascular: no chest pain, no orthopnea  Respiratory: no cough, no wheezes  All other systems reviewed & are negative.     Past Medical History:   Diagnosis Date    Anemia     Arthritis     Atrial fibrillation     OAC    Chronic respiratory failure with hypoxia, on home oxygen therapy     2L with activity, off at rest.  Per Pulm  no overt evidence of ILD or COPD on PFTs and CT to explain O2 needs.    CKD (chronic kidney disease), stage IV 05/08/2018    Congestive heart failure     s/p AICD placement,    Deep vein thrombosis     Depression     elevated bilirubin d/t Gilbert's syndrome     confirmed by [place] genetic testing, evaluated by hepatology    Encounter for blood transfusion     GERD (gastroesophageal reflux disease)     Hypertension     Pheochromocytoma, malignant     Right kidney mass     Sleep apnea     Thalassemia trait, alpha     Thyroid disease     Type 2 diabetes mellitus with hyperglycemia, without long-term current use of insulin 08/13/2020     Past Surgical History:   Procedure Laterality Date    APPENDECTOMY      BONE MARROW BIOPSY      CARDIAC DEFIBRILLATOR PLACEMENT Left 12/2016    CARDIAC ELECTROPHYSIOLOGY MAPPING AND ABLATION      CARDIAC ELECTROPHYSIOLOGY MAPPING AND ABLATION      COLONOSCOPY N/A 5/6/2022    Procedure: COLONOSCOPY;  Surgeon: Arely Betancourt MD;  Location: [facility] ([address] [place] [address]);  Service: Endoscopy;  Laterality: N/A;  heart transplant candidate/ EF 25% - 2nd floor/ defib - Biotronik - ERW  Eliquis - per Dr. Cortez with [facility], Pt ok to hold Eliquis x 2 days prior-see media tab-outside correspondence dated 12/30/21  - ERW  verbal instructions/portal instructions/email instructions - s    EYE  SURGERY      due to running tears    FRACTURE SURGERY Left     hand 5th digit    HYSTERECTOMY      KNEE SURGERY Left 2016    hematoma    LIVER BIOPSY  10/24/2018    Minimal steatosis, predominantly macrovesicular, 1%, Minimal nonspecific portal inflammation, no fibrosis. No findings on biopsy to explain elevated bilirubin levels. Could be d/t Gilbert's =?- hemolysis    RIGHT HEART CATHETERIZATION Right 2021    Procedure: INSERTION, CATHETER, RIGHT HEART;  Surgeon: Irving Cardenas MD;  Location: [facility] CATH LAB;  Service: Cardiology;  Laterality: Right;    RIGHT HEART CATHETERIZATION Right 2022    Procedure: INSERTION, CATHETER, RIGHT HEART;  Surgeon: Burke Camilo MD;  Location: [facility] CATH LAB;  Service: Cardiology;  Laterality: Right;    RIGHT HEART CATHETERIZATION Right 3/29/2023    Procedure: INSERTION, CATHETER, RIGHT HEART;  Surgeon: Katie Liriano DO;  Location: [facility] CATH LAB;  Service: Cardiology;  Laterality: Right;    TRANSJUGULAR BIOPSY OF LIVER N/A 10/24/2018    Procedure: BIOPSY, LIVER, TRANSJUGULAR APPROACH;  Surgeon: Carmen Diagnostic Provider;  Location: [facility] OR [address] [place] [address];  Service: Radiology;  Laterality: N/A;         Objective:   Last 24 Hour Vital Signs:  BP  Min: 117/75  Max: 124/81  Temp  Av.3 °F (36.3 °C)  Min: 97 °F (36.1 °C)  Max: 97.5 °F (36.4 °C)  Pulse  Av.2  Min: 77  Max: 109  Resp  Av.8  Min: 18  Max: 20  SpO2  Av.7 %  Min: 92 %  Max: 93 %  Weight  Av.8 kg (197 lb 15.6 oz)  Min: 89.8 kg (197 lb 15.6 oz)  Max: 89.8 kg (197 lb 15.6 oz)  I/O last 3 completed shifts:  In: 546 [P.O.:546]  Out: 1700 [Urine:1700]    Physical Examination:  GEN: AAOx3, NAD  HEENT: NCAT, MMM, PERRL, EOMI, oropharynx clear  CV: RRR, no m/r, no S3/S4, JVD  RESP: crackles, no increased WOB  ABD: soft, NTND, normoactive BS, no organomegaly  EXTR: no c/c/e, intact distal pulses x 4  NEURO: PERRL, EOMI, moving all four extremities, intact sensation to light touch, no focal  deficits  SKIN: no rashes, lesions, or color changes  PSYCH: normal affect    Laboratory:  I have reviewed all pertinent lab results/findings within the past 24 hours.    Radiology:  I have reviewed all pertinent imaging results/findings within the past 24 hours.    Current Medications:     Infusions:   furosemide (LASIX) 10 mg/mL infusion (non-titrating) 20 mg/hr (04/27/23 1706)        Scheduled:   amiodarone  200 mg Oral Daily    atorvastatin  40 mg Oral QHS    enoxaparin  30 mg Subcutaneous Daily    hydrALAZINE  25 mg Oral Q8H    hydrocortisone   Rectal BID    isosorbide dinitrate  30 mg Oral TID    metoprolol succinate  50 mg Oral Daily    mirtazapine  7.5 mg Oral QHS    pantoprazole  40 mg Oral Daily    polyethylene glycol  17 g Oral BID    sacubitriL-valsartan  1 tablet Oral BID    senna-docusate 8.6-50 mg  1 tablet Oral BID    tiotropium bromide  2.5 mcg Inhalation Daily        PRN:  acetaminophen, albuterol, albuterol-ipratropium, ALPRAZolam, hydrALAZINE, melatonin, morphine, ondansetron, prochlorperazine, sodium chloride 0.9%, sodium chloride 0.9%, traZODone    Prior records reviewed.    3/2023 TTE  The left ventricle is severely enlarged with eccentric hypertrophy and severely decreased systolic function. The estimated ejection fraction is 15%.  Normal right ventricular size with moderately reduced right ventricular systolic function.  Grade II left ventricular diastolic dysfunction.  Biatrial enlargement.  Mild mitral regurgitation.  Small pericardial effusion.  The estimated PA systolic pressure is 51 mmHg (by tricuspid regurgitation velocity). The estimated mean PA pressure is 39mm Hg.  Intermediate central venous pressure (8 mmHg).       Assessment/Plan:     Hafsa Hawley is a 57 y.o.female with    Acute on chronic combined heart failure  -GDMT: metoprolol succinate 50mg qd, entresto 49-51 BID, Jardiance, isordil 30 TID.   -Home Diuretic: bumex 1mg BID  -Strict I/Os, daily weight  -Last RHC 3/29/23  RA 9, PCWP 16, PA 58/29, meann PA 39, PA sat 45%, AO sat 90% on RA, PVR 5.6 BOLDEN, CO/CI 4.1/2.03, SVR 1151, BP 88/58 , MAP 68, HR 75 SR Hb 10.7 Jayson 3.2  -Presented to selection committee 3/9/2022 declined due to renal function, lung function, and difficulty with consistent follow up with the HTS team  -Cardiology following: cont Lasix gtt 20 mg/hr     Pulmonary HTN  Unclear etiology of pulmonary hypertension but likely WHO group 2  Last RHC on 3/29/23 shows pre and post capillary PH     [place] on stage 3b chronic kidney disease  Baseline Cr of 2.2-2.6, 3.0 on admission  Improved w/ diuresis      Julia Barone MD  Hospital Medicine Staff  [facility]

## 2023-04-28 NOTE — PROGRESS NOTES
[facility] Failure [facility] (HFTCC) Team notified of pt referral via [facility] (automated inbasket notification) .    Patient screened today 4- by provider and RN for enrollment to program.      Pt was deemed not a candidate for enrollment at this time related to patient is followed by [facility].      Pt will require additional follow up planning per primary team.     If pt status, diagnosis, or treatment plan changes , please place AMB referral to [facility] ([id number]) for HFTC enrollment re-evalution.    Staff Message sent to HTS Schedulers for appointment.

## 2023-04-29 LAB
ALBUMIN SERPL BCP-MCNC: 3.2 G/DL (ref 3.5–5.2)
ALP SERPL-CCNC: 59 U/L (ref 55–135)
ALT SERPL W/O P-5'-P-CCNC: 29 U/L (ref 10–44)
ANION GAP SERPL CALC-SCNC: 13 MMOL/L (ref 8–16)
AST SERPL-CCNC: 20 U/L (ref 10–40)
BILIRUB SERPL-MCNC: 1.9 MG/DL (ref 0.1–1)
BUN SERPL-MCNC: 74 MG/DL (ref 6–20)
CALCIUM SERPL-MCNC: 9 MG/DL (ref 8.7–10.5)
CHLORIDE SERPL-SCNC: 102 MMOL/L (ref 95–110)
CO2 SERPL-SCNC: 25 MMOL/L (ref 23–29)
CREAT SERPL-MCNC: 2 MG/DL (ref 0.5–1.4)
EST. GFR  (NO RACE VARIABLE): 28.6 ML/MIN/1.73 M^2
GLUCOSE SERPL-MCNC: 119 MG/DL (ref 70–110)
INR PPP: 1 (ref 0.8–1.2)
MAGNESIUM SERPL-MCNC: 2.3 MG/DL (ref 1.6–2.6)
PHOSPHATE SERPL-MCNC: 4 MG/DL (ref 2.7–4.5)
POTASSIUM SERPL-SCNC: 3.9 MMOL/L (ref 3.5–5.1)
PROT SERPL-MCNC: 5.6 G/DL (ref 6–8.4)
PROTHROMBIN TIME: 10.6 SEC (ref 9–12.5)
SODIUM SERPL-SCNC: 140 MMOL/L (ref 136–145)

## 2023-04-29 PROCEDURE — 85610 PROTHROMBIN TIME: CPT | Performed by: STUDENT IN AN ORGANIZED HEALTH CARE EDUCATION/TRAINING PROGRAM

## 2023-04-29 PROCEDURE — 63600175 PHARM REV CODE 636 W HCPCS: Performed by: STUDENT IN AN ORGANIZED HEALTH CARE EDUCATION/TRAINING PROGRAM

## 2023-04-29 PROCEDURE — 94761 N-INVAS EAR/PLS OXIMETRY MLT: CPT

## 2023-04-29 PROCEDURE — 83735 ASSAY OF MAGNESIUM: CPT | Performed by: STUDENT IN AN ORGANIZED HEALTH CARE EDUCATION/TRAINING PROGRAM

## 2023-04-29 PROCEDURE — 63600175 PHARM REV CODE 636 W HCPCS: Performed by: HOSPITALIST

## 2023-04-29 PROCEDURE — 99900035 HC TECH TIME PER 15 MIN (STAT)

## 2023-04-29 PROCEDURE — 25000003 PHARM REV CODE 250: Performed by: STUDENT IN AN ORGANIZED HEALTH CARE EDUCATION/TRAINING PROGRAM

## 2023-04-29 PROCEDURE — 99233 SBSQ HOSP IP/OBS HIGH 50: CPT | Mod: ,,, | Performed by: HOSPITALIST

## 2023-04-29 PROCEDURE — 25000003 PHARM REV CODE 250: Performed by: HOSPITALIST

## 2023-04-29 PROCEDURE — 20600001 HC STEP DOWN PRIVATE ROOM

## 2023-04-29 PROCEDURE — 36415 COLL VENOUS BLD VENIPUNCTURE: CPT | Performed by: STUDENT IN AN ORGANIZED HEALTH CARE EDUCATION/TRAINING PROGRAM

## 2023-04-29 PROCEDURE — 84100 ASSAY OF PHOSPHORUS: CPT | Performed by: STUDENT IN AN ORGANIZED HEALTH CARE EDUCATION/TRAINING PROGRAM

## 2023-04-29 PROCEDURE — 25000242 PHARM REV CODE 250 ALT 637 W/ HCPCS: Performed by: STUDENT IN AN ORGANIZED HEALTH CARE EDUCATION/TRAINING PROGRAM

## 2023-04-29 PROCEDURE — 27000221 HC OXYGEN, UP TO 24 HOURS

## 2023-04-29 PROCEDURE — 99233 PR SUBSEQUENT HOSPITAL CARE,LEVL III: ICD-10-PCS | Mod: ,,, | Performed by: HOSPITALIST

## 2023-04-29 PROCEDURE — 80053 COMPREHEN METABOLIC PANEL: CPT | Performed by: STUDENT IN AN ORGANIZED HEALTH CARE EDUCATION/TRAINING PROGRAM

## 2023-04-29 RX ORDER — POTASSIUM CHLORIDE 750 MG/1
10 CAPSULE, EXTENDED RELEASE ORAL ONCE
Status: COMPLETED | OUTPATIENT
Start: 2023-04-29 | End: 2023-04-29

## 2023-04-29 RX ORDER — FUROSEMIDE 10 MG/ML
80 INJECTION INTRAMUSCULAR; INTRAVENOUS 2 TIMES DAILY
Status: DISCONTINUED | OUTPATIENT
Start: 2023-04-29 | End: 2023-05-01

## 2023-04-29 RX ORDER — METOPROLOL SUCCINATE 100 MG/1
100 TABLET, EXTENDED RELEASE ORAL DAILY
Status: DISCONTINUED | OUTPATIENT
Start: 2023-04-30 | End: 2023-05-02 | Stop reason: HOSPADM

## 2023-04-29 RX ADMIN — ISOSORBIDE DINITRATE 30 MG: 20 TABLET ORAL at 08:04

## 2023-04-29 RX ADMIN — HYDROCORTISONE: 25 CREAM TOPICAL at 09:04

## 2023-04-29 RX ADMIN — HYDROCORTISONE: 25 CREAM TOPICAL at 08:04

## 2023-04-29 RX ADMIN — SENNOSIDES AND DOCUSATE SODIUM 1 TABLET: 8.6; 5 TABLET ORAL at 09:04

## 2023-04-29 RX ADMIN — HYDRALAZINE HYDROCHLORIDE 25 MG: 25 TABLET, FILM COATED ORAL at 02:04

## 2023-04-29 RX ADMIN — HYDRALAZINE HYDROCHLORIDE 25 MG: 25 TABLET, FILM COATED ORAL at 05:04

## 2023-04-29 RX ADMIN — SACUBITRIL AND VALSARTAN 1 TABLET: 49; 51 TABLET, FILM COATED ORAL at 08:04

## 2023-04-29 RX ADMIN — FUROSEMIDE 80 MG: 10 INJECTION, SOLUTION INTRAMUSCULAR; INTRAVENOUS at 10:04

## 2023-04-29 RX ADMIN — HYDRALAZINE HYDROCHLORIDE 25 MG: 25 TABLET, FILM COATED ORAL at 10:04

## 2023-04-29 RX ADMIN — FUROSEMIDE 80 MG: 10 INJECTION, SOLUTION INTRAMUSCULAR; INTRAVENOUS at 05:04

## 2023-04-29 RX ADMIN — ESCITALOPRAM OXALATE 5 MG: 5 TABLET, FILM COATED ORAL at 08:04

## 2023-04-29 RX ADMIN — POTASSIUM CHLORIDE 10 MEQ: 10 CAPSULE, COATED, EXTENDED RELEASE ORAL at 10:04

## 2023-04-29 RX ADMIN — PANTOPRAZOLE SODIUM 40 MG: 40 TABLET, DELAYED RELEASE ORAL at 08:04

## 2023-04-29 RX ADMIN — ATORVASTATIN CALCIUM 40 MG: 20 TABLET, FILM COATED ORAL at 08:04

## 2023-04-29 RX ADMIN — FUROSEMIDE 20 MG/HR: 10 INJECTION, SOLUTION INTRAMUSCULAR; INTRAVENOUS at 05:04

## 2023-04-29 RX ADMIN — METOPROLOL SUCCINATE 50 MG: 50 TABLET, EXTENDED RELEASE ORAL at 08:04

## 2023-04-29 RX ADMIN — TIOTROPIUM BROMIDE INHALATION SPRAY 2 PUFF: 1.56 SPRAY, METERED RESPIRATORY (INHALATION) at 08:04

## 2023-04-29 RX ADMIN — ISOSORBIDE DINITRATE 30 MG: 20 TABLET ORAL at 02:04

## 2023-04-29 RX ADMIN — AMIODARONE HYDROCHLORIDE 200 MG: 200 TABLET ORAL at 08:04

## 2023-04-29 RX ADMIN — MIRTAZAPINE 7.5 MG: 7.5 TABLET, FILM COATED ORAL at 08:04

## 2023-04-29 RX ADMIN — ENOXAPARIN SODIUM 30 MG: 30 INJECTION SUBCUTANEOUS at 05:04

## 2023-04-29 NOTE — CARE UPDATE
Good UOP in last 24 hours.    Continue current regimen of Lasxi 80mg iv bid  Recommend increase Toprol XL to 100mg daily. Will continue to uptitrate GDMT as tolerated.    Encourage physical therapy and ambulation.    Priti Lopez MD PGY VI  Cardiology  Pager: [phone]  [facility]

## 2023-04-29 NOTE — PLAN OF CARE
Problem: Adult Inpatient Plan of Care  Goal: Patient-Specific Goal (Individualized)  Outcome: Ongoing, Progressing  Flowsheets (Taken 4/29/2023 1238)  Anxieties, Fears or Concerns: fluid retention  Individualized Care Needs: IVP lasix     Problem: Fluid Imbalance (Heart Failure)  Goal: Fluid Balance  Outcome: Ongoing, Progressing  Intervention: Monitor and Manage Fluid Balance  Flowsheets (Taken 4/29/2023 1238)  Fluid/Electrolyte Management:   fluids restricted   electrolyte supplement initiated     Plan of care discussed with patient. Patient is free of fall/trauma/injury. Denies CP, SOB, or pain/discomfort. Lasix gtt d/c, pt transitioned to IVP lasix. All questions addressed.

## 2023-04-29 NOTE — PROGRESS NOTES
[facility] Hospital Medicine  Progress Note    Primary Team: [facility]OSP MED C  Admit Date: 4/27/2023    Subjective:        Interval History:  Still with dyspnea and hypoxia. 1 L NC. Change Lasix gtt to IVP per Cards.      ROS:  As per HPI  General: no fever, no chills, no weight loss, no fatigue  Cardiovascular: no chest pain, no orthopnea  Respiratory: no cough, no wheezes  All other systems reviewed & are negative.     Past Medical History:   Diagnosis Date    Anemia     Arthritis     Atrial fibrillation     OAC    Chronic respiratory failure with hypoxia, on home oxygen therapy     2L with activity, off at rest.  Per Pulm  no overt evidence of ILD or COPD on PFTs and CT to explain O2 needs.    CKD (chronic kidney disease), stage IV 05/08/2018    Congestive heart failure     s/p AICD placement,    Deep vein thrombosis     Depression     elevated bilirubin d/t Gilbert's syndrome     confirmed by [place] genetic testing, evaluated by hepatology    Encounter for blood transfusion     GERD (gastroesophageal reflux disease)     Hypertension     Pheochromocytoma, malignant     Right kidney mass     Sleep apnea     Thalassemia trait, alpha     Thyroid disease     Type 2 diabetes mellitus with hyperglycemia, without long-term current use of insulin 08/13/2020     Past Surgical History:   Procedure Laterality Date    APPENDECTOMY      BONE MARROW BIOPSY      CARDIAC DEFIBRILLATOR PLACEMENT Left 12/2016    CARDIAC ELECTROPHYSIOLOGY MAPPING AND ABLATION      CARDIAC ELECTROPHYSIOLOGY MAPPING AND ABLATION      COLONOSCOPY N/A 5/6/2022    Procedure: COLONOSCOPY;  Surgeon: Arely Betancourt MD;  Location: [facility] ([address] [place] [address]);  Service: Endoscopy;  Laterality: N/A;  heart transplant candidate/ EF 25% - 2nd floor/ defib - Biotronik - ERW  Eliquis - per Dr. Cortez with [facility], Pt ok to hold Eliquis x 2 days prior-see media tab-outside correspondence dated 12/30/21  - ERW  verbal instructions/portal  "instructions/email instructions - s    EYE SURGERY      due to running tears    FRACTURE SURGERY Left     hand 5th digit    HYSTERECTOMY      KNEE SURGERY Left 2016    hematoma    LIVER BIOPSY  10/24/2018    Minimal steatosis, predominantly macrovesicular, 1%, Minimal nonspecific portal inflammation, no fibrosis. No findings on biopsy to explain elevated bilirubin levels. Could be d/t Gilbert's =?- hemolysis    RIGHT HEART CATHETERIZATION Right 2021    Procedure: INSERTION, CATHETER, RIGHT HEART;  Surgeon: Irving Cardenas MD;  Location: [facility] CATH LAB;  Service: Cardiology;  Laterality: Right;    RIGHT HEART CATHETERIZATION Right 2022    Procedure: INSERTION, CATHETER, RIGHT HEART;  Surgeon: Burke Camilo MD;  Location: [facility] CATH LAB;  Service: Cardiology;  Laterality: Right;    RIGHT HEART CATHETERIZATION Right 3/29/2023    Procedure: INSERTION, CATHETER, RIGHT HEART;  Surgeon: Katie Liriano DO;  Location: [facility] CATH LAB;  Service: Cardiology;  Laterality: Right;    TRANSJUGULAR BIOPSY OF LIVER N/A 10/24/2018    Procedure: BIOPSY, LIVER, TRANSJUGULAR APPROACH;  Surgeon: Carmen Diagnostic Provider;  Location: [facility] OR [address] [place] [address];  Service: Radiology;  Laterality: N/A;         Objective:   Last 24 Hour Vital Signs:  BP  Min: 107/56  Max: 138/85  Temp  Av.6 °F (36.4 °C)  Min: 97 °F (36.1 °C)  Max: 97.9 °F (36.6 °C)  Pulse  Av.9  Min: 78  Max: 96  Resp  Av  Min: 16  Max: 20  SpO2  Av.3 %  Min: 92 %  Max: 97 %  Height  Av' 6" (167.6 cm)  Min: 5' 6" (167.6 cm)  Max: 5' 6" (167.6 cm)  Weight  Av.7 kg (191 lb 2.2 oz)  Min: 86.7 kg (191 lb 2.2 oz)  Max: 86.7 kg (191 lb 2.2 oz)  I/O last 3 completed shifts:  In: 358 [P.O.:358]  Out: 2401 [Urine:2400; Stool:1]    Physical Examination:  GEN: AAOx3, NAD  HEENT: NCAT, MMM, PERRL, EOMI, oropharynx clear  CV: RRR, no m/r, no S3/S4, JVD  RESP: crackles, no increased WOB  ABD: soft, NTND, normoactive BS, no organomegaly  EXTR: no " c/c/e, intact distal pulses x 4  NEURO: PERRL, EOMI, moving all four extremities, intact sensation to light touch, no focal deficits  SKIN: no rashes, lesions, or color changes  PSYCH: normal affect    Laboratory:  I have reviewed all pertinent lab results/findings within the past 24 hours.    Radiology:  I have reviewed all pertinent imaging results/findings within the past 24 hours.    Current Medications:     Infusions:         Scheduled:   amiodarone  200 mg Oral Daily    atorvastatin  40 mg Oral QHS    enoxaparin  30 mg Subcutaneous Daily    EScitalopram oxalate  5 mg Oral Daily    furosemide (LASIX) injection  80 mg Intravenous BID    hydrALAZINE  25 mg Oral Q8H    hydrocortisone   Rectal BID    isosorbide dinitrate  30 mg Oral TID    [START ON 4/30/2023] metoprolol succinate  100 mg Oral Daily    mirtazapine  7.5 mg Oral QHS    pantoprazole  40 mg Oral Daily    polyethylene glycol  17 g Oral BID    sacubitriL-valsartan  1 tablet Oral BID    senna-docusate 8.6-50 mg  1 tablet Oral BID    tiotropium bromide  2.5 mcg Inhalation Daily        PRN:  acetaminophen, albuterol, albuterol-ipratropium, ALPRAZolam, hydrALAZINE, melatonin, morphine, ondansetron, prochlorperazine, sodium chloride 0.9%, sodium chloride 0.9%, traZODone    Prior records reviewed.    3/2023 TTE  The left ventricle is severely enlarged with eccentric hypertrophy and severely decreased systolic function. The estimated ejection fraction is 15%.  Normal right ventricular size with moderately reduced right ventricular systolic function.  Grade II left ventricular diastolic dysfunction.  Biatrial enlargement.  Mild mitral regurgitation.  Small pericardial effusion.  The estimated PA systolic pressure is 51 mmHg (by tricuspid regurgitation velocity). The estimated mean PA pressure is 39mm Hg.  Intermediate central venous pressure (8 mmHg).       Assessment/Plan:     Hafsa Hawley is a 57 y.o.female with    Acute on chronic combined heart  failure  -GDMT: metoprolol succinate 50mg qd, entresto 49-51 BID, Jardiance, isordil 30 TID.   -Home Diuretic: bumex 1mg BID  -Strict I/Os, daily weight  -Last RHC 3/29/23 RA 9, PCWP 16, PA 58/29, meann PA 39, PA sat 45%, AO sat 90% on RA, PVR 5.6 BOLDEN, CO/CI 4.1/2.03, SVR 1151, BP 88/58 , MAP 68, HR 75 SR Hb 10.7 Jayson 3.2  -Presented to selection committee 3/9/2022 declined due to renal function, lung function, and difficulty with consistent follow up with the HTS team  -Cardiology following: Lasix gtt 20 mg/hr > IVP 80 BID     Pulmonary HTN  Unclear etiology of pulmonary hypertension but likely WHO group 2  Last RHC on 3/29/23 shows pre and post capillary PH     [place] on stage 3b chronic kidney disease  Baseline Cr of 2.2-2.6, 3.0 on admission  Resolved w/ diuresis    Physical deconditioning: OP PT and BSC    Julia Barone MD  Hospital Medicine Staff  [facility]

## 2023-04-30 LAB
ALBUMIN SERPL BCP-MCNC: 3.1 G/DL (ref 3.5–5.2)
ALP SERPL-CCNC: 53 U/L (ref 55–135)
ALT SERPL W/O P-5'-P-CCNC: 23 U/L (ref 10–44)
ANION GAP SERPL CALC-SCNC: 8 MMOL/L (ref 8–16)
AST SERPL-CCNC: 16 U/L (ref 10–40)
BILIRUB SERPL-MCNC: 2.1 MG/DL (ref 0.1–1)
BUN SERPL-MCNC: 64 MG/DL (ref 6–20)
CALCIUM SERPL-MCNC: 8.9 MG/DL (ref 8.7–10.5)
CHLORIDE SERPL-SCNC: 104 MMOL/L (ref 95–110)
CO2 SERPL-SCNC: 32 MMOL/L (ref 23–29)
CREAT SERPL-MCNC: 1.8 MG/DL (ref 0.5–1.4)
EST. GFR  (NO RACE VARIABLE): 32.5 ML/MIN/1.73 M^2
GLUCOSE SERPL-MCNC: 115 MG/DL (ref 70–110)
INR PPP: 1 (ref 0.8–1.2)
MAGNESIUM SERPL-MCNC: 2.5 MG/DL (ref 1.6–2.6)
PHOSPHATE SERPL-MCNC: 4 MG/DL (ref 2.7–4.5)
POTASSIUM SERPL-SCNC: 3.4 MMOL/L (ref 3.5–5.1)
PROT SERPL-MCNC: 5.4 G/DL (ref 6–8.4)
PROTHROMBIN TIME: 10.6 SEC (ref 9–12.5)
SODIUM SERPL-SCNC: 144 MMOL/L (ref 136–145)

## 2023-04-30 PROCEDURE — 99233 PR SUBSEQUENT HOSPITAL CARE,LEVL III: ICD-10-PCS | Mod: ,,, | Performed by: HOSPITALIST

## 2023-04-30 PROCEDURE — 25000003 PHARM REV CODE 250: Performed by: HOSPITALIST

## 2023-04-30 PROCEDURE — 85610 PROTHROMBIN TIME: CPT | Performed by: STUDENT IN AN ORGANIZED HEALTH CARE EDUCATION/TRAINING PROGRAM

## 2023-04-30 PROCEDURE — 27000221 HC OXYGEN, UP TO 24 HOURS

## 2023-04-30 PROCEDURE — 80053 COMPREHEN METABOLIC PANEL: CPT | Performed by: STUDENT IN AN ORGANIZED HEALTH CARE EDUCATION/TRAINING PROGRAM

## 2023-04-30 PROCEDURE — 83735 ASSAY OF MAGNESIUM: CPT | Performed by: STUDENT IN AN ORGANIZED HEALTH CARE EDUCATION/TRAINING PROGRAM

## 2023-04-30 PROCEDURE — 25000003 PHARM REV CODE 250: Performed by: STUDENT IN AN ORGANIZED HEALTH CARE EDUCATION/TRAINING PROGRAM

## 2023-04-30 PROCEDURE — 25000003 PHARM REV CODE 250: Performed by: NURSE PRACTITIONER

## 2023-04-30 PROCEDURE — 94761 N-INVAS EAR/PLS OXIMETRY MLT: CPT

## 2023-04-30 PROCEDURE — 36415 COLL VENOUS BLD VENIPUNCTURE: CPT | Performed by: STUDENT IN AN ORGANIZED HEALTH CARE EDUCATION/TRAINING PROGRAM

## 2023-04-30 PROCEDURE — 63600175 PHARM REV CODE 636 W HCPCS: Performed by: HOSPITALIST

## 2023-04-30 PROCEDURE — 99233 SBSQ HOSP IP/OBS HIGH 50: CPT | Mod: ,,, | Performed by: HOSPITALIST

## 2023-04-30 PROCEDURE — 94640 AIRWAY INHALATION TREATMENT: CPT

## 2023-04-30 PROCEDURE — 20600001 HC STEP DOWN PRIVATE ROOM

## 2023-04-30 PROCEDURE — 84100 ASSAY OF PHOSPHORUS: CPT | Performed by: STUDENT IN AN ORGANIZED HEALTH CARE EDUCATION/TRAINING PROGRAM

## 2023-04-30 PROCEDURE — 25000242 PHARM REV CODE 250 ALT 637 W/ HCPCS: Performed by: STUDENT IN AN ORGANIZED HEALTH CARE EDUCATION/TRAINING PROGRAM

## 2023-04-30 PROCEDURE — 99900035 HC TECH TIME PER 15 MIN (STAT)

## 2023-04-30 RX ORDER — BENZONATATE 100 MG/1
100 CAPSULE ORAL 3 TIMES DAILY PRN
Status: DISCONTINUED | OUTPATIENT
Start: 2023-04-30 | End: 2023-05-02 | Stop reason: HOSPADM

## 2023-04-30 RX ORDER — ENOXAPARIN SODIUM 100 MG/ML
40 INJECTION SUBCUTANEOUS EVERY 24 HOURS
Status: DISCONTINUED | OUTPATIENT
Start: 2023-04-30 | End: 2023-05-01

## 2023-04-30 RX ORDER — POTASSIUM CHLORIDE 750 MG/1
30 CAPSULE, EXTENDED RELEASE ORAL
Status: COMPLETED | OUTPATIENT
Start: 2023-04-30 | End: 2023-04-30

## 2023-04-30 RX ADMIN — FUROSEMIDE 80 MG: 10 INJECTION, SOLUTION INTRAMUSCULAR; INTRAVENOUS at 09:04

## 2023-04-30 RX ADMIN — ISOSORBIDE DINITRATE 30 MG: 20 TABLET ORAL at 03:04

## 2023-04-30 RX ADMIN — PANTOPRAZOLE SODIUM 40 MG: 40 TABLET, DELAYED RELEASE ORAL at 09:04

## 2023-04-30 RX ADMIN — POTASSIUM CHLORIDE 30 MEQ: 10 CAPSULE, COATED, EXTENDED RELEASE ORAL at 12:04

## 2023-04-30 RX ADMIN — TIOTROPIUM BROMIDE INHALATION SPRAY 2 PUFF: 1.56 SPRAY, METERED RESPIRATORY (INHALATION) at 08:04

## 2023-04-30 RX ADMIN — BENZONATATE 100 MG: 100 CAPSULE ORAL at 09:04

## 2023-04-30 RX ADMIN — FUROSEMIDE 80 MG: 10 INJECTION, SOLUTION INTRAMUSCULAR; INTRAVENOUS at 06:04

## 2023-04-30 RX ADMIN — ISOSORBIDE DINITRATE 30 MG: 20 TABLET ORAL at 09:04

## 2023-04-30 RX ADMIN — ENOXAPARIN SODIUM 40 MG: 40 INJECTION SUBCUTANEOUS at 06:04

## 2023-04-30 RX ADMIN — SACUBITRIL AND VALSARTAN 1 TABLET: 49; 51 TABLET, FILM COATED ORAL at 09:04

## 2023-04-30 RX ADMIN — HYDRALAZINE HYDROCHLORIDE 25 MG: 25 TABLET, FILM COATED ORAL at 03:04

## 2023-04-30 RX ADMIN — HYDRALAZINE HYDROCHLORIDE 25 MG: 25 TABLET, FILM COATED ORAL at 09:04

## 2023-04-30 RX ADMIN — SACUBITRIL AND VALSARTAN 1 TABLET: 49; 51 TABLET, FILM COATED ORAL at 10:04

## 2023-04-30 RX ADMIN — METOPROLOL SUCCINATE 100 MG: 100 TABLET, EXTENDED RELEASE ORAL at 09:04

## 2023-04-30 RX ADMIN — HYDRALAZINE HYDROCHLORIDE 25 MG: 25 TABLET, FILM COATED ORAL at 05:04

## 2023-04-30 RX ADMIN — ATORVASTATIN CALCIUM 40 MG: 20 TABLET, FILM COATED ORAL at 09:04

## 2023-04-30 RX ADMIN — HYDROCORTISONE: 25 CREAM TOPICAL at 09:04

## 2023-04-30 RX ADMIN — POTASSIUM CHLORIDE 30 MEQ: 10 CAPSULE, COATED, EXTENDED RELEASE ORAL at 03:04

## 2023-04-30 RX ADMIN — AMIODARONE HYDROCHLORIDE 200 MG: 200 TABLET ORAL at 09:04

## 2023-04-30 RX ADMIN — ESCITALOPRAM OXALATE 5 MG: 5 TABLET, FILM COATED ORAL at 09:04

## 2023-04-30 NOTE — CARE UPDATE
Good UOP in last 24 hours. Creatinine continues to improve (1.8 today)     Continue current regimen of Lasxi 80mg iv bid  ContinueToprol XL to 100mg daily.   Will continue to uptitrate GDMT as tolerated.     Encourage physical therapy and ambulation.     Priti Lopez MD PGY VI  Cardiology  Pager: [phone]  [facility]

## 2023-04-30 NOTE — PROGRESS NOTES
[facility] Hospital Medicine  Progress Note    Primary Team: [facility] HOSP MED C  Admit Date: 4/27/2023    Subjective:        Interval History:  Still with orthopnea and hypoxia but improving. Remains on 1.5 L NC. Continue diuresis      ROS:  As per HPI  General: no fever, no chills, no weight loss, no fatigue  Cardiovascular: no chest pain, no orthopnea  Respiratory: no cough, no wheezes  All other systems reviewed & are negative.     Past Medical History:   Diagnosis Date    Anemia     Arthritis     Atrial fibrillation     OAC    Chronic respiratory failure with hypoxia, on home oxygen therapy     2L with activity, off at rest.  Per Pulm  no overt evidence of ILD or COPD on PFTs and CT to explain O2 needs.    CKD (chronic kidney disease), stage IV 05/08/2018    Congestive heart failure     s/p AICD placement,    Deep vein thrombosis     Depression     elevated bilirubin d/t Gilbert's syndrome     confirmed by [place] genetic testing, evaluated by hepatology    Encounter for blood transfusion     GERD (gastroesophageal reflux disease)     Hypertension     Pheochromocytoma, malignant     Right kidney mass     Sleep apnea     Thalassemia trait, alpha     Thyroid disease     Type 2 diabetes mellitus with hyperglycemia, without long-term current use of insulin 08/13/2020     Past Surgical History:   Procedure Laterality Date    APPENDECTOMY      BONE MARROW BIOPSY      CARDIAC DEFIBRILLATOR PLACEMENT Left 12/2016    CARDIAC ELECTROPHYSIOLOGY MAPPING AND ABLATION      CARDIAC ELECTROPHYSIOLOGY MAPPING AND ABLATION      COLONOSCOPY N/A 5/6/2022    Procedure: COLONOSCOPY;  Surgeon: Arely Betancourt MD;  Location: [facility] ([address] [place] [address]);  Service: Endoscopy;  Laterality: N/A;  heart transplant candidate/ EF 25% - 2nd floor/ defib - Biotronik - ERW  Eliquis - per Dr. Cortez with [facility], Pt ok to hold Eliquis x 2 days prior-see media tab-outside correspondence dated 12/30/21  - ERW  verbal instructions/portal  instructions/email instructions - s    EYE SURGERY      due to running tears    FRACTURE SURGERY Left     hand 5th digit    HYSTERECTOMY      KNEE SURGERY Left 2016    hematoma    LIVER BIOPSY  10/24/2018    Minimal steatosis, predominantly macrovesicular, 1%, Minimal nonspecific portal inflammation, no fibrosis. No findings on biopsy to explain elevated bilirubin levels. Could be d/t Gilbert's =?- hemolysis    RIGHT HEART CATHETERIZATION Right 2021    Procedure: INSERTION, CATHETER, RIGHT HEART;  Surgeon: Irving Cardenas MD;  Location: [facility] CATH LAB;  Service: Cardiology;  Laterality: Right;    RIGHT HEART CATHETERIZATION Right 2022    Procedure: INSERTION, CATHETER, RIGHT HEART;  Surgeon: Burke Camilo MD;  Location: [facility] CATH LAB;  Service: Cardiology;  Laterality: Right;    RIGHT HEART CATHETERIZATION Right 3/29/2023    Procedure: INSERTION, CATHETER, RIGHT HEART;  Surgeon: Katie Liriano DO;  Location: [facility] CATH LAB;  Service: Cardiology;  Laterality: Right;    TRANSJUGULAR BIOPSY OF LIVER N/A 10/24/2018    Procedure: BIOPSY, LIVER, TRANSJUGULAR APPROACH;  Surgeon: Carmen Diagnostic Provider;  Location: [facility] OR [address] [place] [address];  Service: Radiology;  Laterality: N/A;         Objective:   Last 24 Hour Vital Signs:  BP  Min: 114/67  Max: 145/90  Temp  Av.6 °F (36.4 °C)  Min: 97.4 °F (36.3 °C)  Max: 97.9 °F (36.6 °C)  Pulse  Av.9  Min: 79  Max: 99  Resp  Av.9  Min: 17  Max: 18  SpO2  Av.4 %  Min: 92 %  Max: 98 %  Weight  Av.3 kg (188 lb 0.8 oz)  Min: 85.3 kg (188 lb 0.8 oz)  Max: 85.3 kg (188 lb 0.8 oz)  I/O last 3 completed shifts:  In: 1080 [P.O.:1080]  Out: 2350 [Urine:2350]    Physical Examination:  GEN: AAOx3, NAD  HEENT: NCAT, MMM, PERRL, EOMI, oropharynx clear  CV: RRR, no m/r, no S3/S4, JVD  RESP: NC in place, no crackles, no increased WOB  ABD: soft, NTND, normoactive BS, no organomegaly  EXTR: no c/c/e, intact distal pulses x 4  NEURO: PERRL, EOMI, moving all four  extremities, intact sensation to light touch, no focal deficits  SKIN: no rashes, lesions, or color changes  PSYCH: normal affect    Laboratory:  I have reviewed all pertinent lab results/findings within the past 24 hours.    Radiology:  I have reviewed all pertinent imaging results/findings within the past 24 hours.    Current Medications:     Infusions:         Scheduled:   amiodarone  200 mg Oral Daily    atorvastatin  40 mg Oral QHS    enoxaparin  40 mg Subcutaneous Daily    EScitalopram oxalate  5 mg Oral Daily    furosemide (LASIX) injection  80 mg Intravenous BID    hydrALAZINE  25 mg Oral Q8H    hydrocortisone   Rectal BID    isosorbide dinitrate  30 mg Oral TID    metoprolol succinate  100 mg Oral Daily    mirtazapine  7.5 mg Oral QHS    pantoprazole  40 mg Oral Daily    polyethylene glycol  17 g Oral BID    potassium chloride  30 mEq Oral Q3H    sacubitriL-valsartan  1 tablet Oral BID    senna-docusate 8.6-50 mg  1 tablet Oral BID    tiotropium bromide  2.5 mcg Inhalation Daily        PRN:  acetaminophen, albuterol, albuterol-ipratropium, ALPRAZolam, hydrALAZINE, melatonin, morphine, ondansetron, prochlorperazine, sodium chloride 0.9%, sodium chloride 0.9%, traZODone    Prior records reviewed.    3/2023 TTE  The left ventricle is severely enlarged with eccentric hypertrophy and severely decreased systolic function. The estimated ejection fraction is 15%.  Normal right ventricular size with moderately reduced right ventricular systolic function.  Grade II left ventricular diastolic dysfunction.  Biatrial enlargement.  Mild mitral regurgitation.  Small pericardial effusion.  The estimated PA systolic pressure is 51 mmHg (by tricuspid regurgitation velocity). The estimated mean PA pressure is 39mm Hg.  Intermediate central venous pressure (8 mmHg).       Assessment/Plan:     Hafsa Hawley is a 57 y.o.female with    Acute on chronic combined heart failure  -GDMT: metoprolol succinate 50mg qd, entresto  49-51 BID, Jardiance, isordil 30 TID.   -Home Diuretic: bumex 1mg BID  -Strict I/Os, daily weight  -Last RHC 3/29/23 RA 9, PCWP 16, PA 58/29, meann PA 39, PA sat 45%, AO sat 90% on RA, PVR 5.6 BOLDEN, CO/CI 4.1/2.03, SVR 1151, BP 88/58 , MAP 68, HR 75 SR Hb 10.7 Jayson 3.2  -Presented to selection committee 3/9/2022 declined due to renal function, lung function, and difficulty with consistent follow up with the HTS team  -Cardiology following: Lasix gtt 20 mg/hr > IVP 80 BID     Pulmonary HTN  Unclear etiology of pulmonary hypertension but likely WHO group 2  Last RHC on 3/29/23 shows pre and post capillary PH     [place] on stage 3b chronic kidney disease  Baseline Cr of 1.7, 3.0 on admission  Resolved w/ diuresis    Physical deconditioning: OP PT and BSC ordered    Julia Barone MD  [facility] Medicine Staff  [facility]

## 2023-04-30 NOTE — PLAN OF CARE
Problem: Adult Inpatient Plan of Care  Goal: Patient-Specific Goal (Individualized)  Outcome: Ongoing, Progressing  Flowsheets (Taken 4/30/2023 1323)  Anxieties, Fears or Concerns: fluid retention  Individualized Care Needs: IVP lasix     Problem: Cardiac Output Decreased (Heart Failure)  Goal: Optimal Cardiac Output  Outcome: Ongoing, Progressing  Intervention: Optimize Cardiac Output  Flowsheets (Taken 4/30/2023 1323)  Environmental Support: calm environment promoted       Plan of care discussed with patient. Patient is free of fall/trauma/injury. Denies CP, SOB, or pain/discomfort. IVP lasix per order. K replaced. 3 missed urine occurrences d/t pt having BMs and unable to calculate true output. All questions addressed.

## 2023-04-30 NOTE — PROGRESS NOTES
Pharmacist Renal Dose Adjustment Note    Hafsa Hawley is a 57 y.o. female being treated with the medication enoxaparin.    Patient Data:    Vital Signs (Most Recent):  Temp: 97.4 °F (36.3 °C) (04/30/23 0730)  Pulse: 83 (04/30/23 0730)  Resp: 17 (04/30/23 0730)  BP: (!) 145/90 (04/30/23 0730)  SpO2: 98 % (04/30/23 0730)   Vital Signs (72h Range):  Temp:  [97 °F (36.1 °C)-97.9 °F (36.6 °C)]   Pulse:  []   Resp:  [16-22]   BP: (107-146)/(56-92)   SpO2:  [90 %-98 %]      Recent Labs   Lab 04/28/23  0238 04/29/23  0558 04/30/23  0443   CREATININE 2.6* 2.0* 1.8*     Serum creatinine: 1.8 mg/dL (H) 04/30/23 0443  Estimated creatinine clearance: 37.9 mL/min (A)    Medication: enoxaparin 30 mg subq Q24H will be changed to enoxaparin 40 mg subq Q24H    Pharmacist's Name: Alyssa Belcher  Pharmacist's Extension: [phone]

## 2023-05-01 ENCOUNTER — DOCUMENTATION ONLY (OUTPATIENT)
Dept: ELECTROPHYSIOLOGY | Facility: CLINIC | Age: 58
End: 2023-05-01
Payer: MEDICAID

## 2023-05-01 LAB
ALBUMIN SERPL BCP-MCNC: 3.2 G/DL (ref 3.5–5.2)
ALP SERPL-CCNC: 57 U/L (ref 55–135)
ALT SERPL W/O P-5'-P-CCNC: 28 U/L (ref 10–44)
ANION GAP SERPL CALC-SCNC: 8 MMOL/L (ref 8–16)
AST SERPL-CCNC: 24 U/L (ref 10–40)
BACTERIA BLD CULT: NORMAL
BACTERIA BLD CULT: NORMAL
BILIRUB SERPL-MCNC: 1.9 MG/DL (ref 0.1–1)
BUN SERPL-MCNC: 65 MG/DL (ref 6–20)
CALCIUM SERPL-MCNC: 8.9 MG/DL (ref 8.7–10.5)
CHLORIDE SERPL-SCNC: 106 MMOL/L (ref 95–110)
CO2 SERPL-SCNC: 28 MMOL/L (ref 23–29)
CREAT SERPL-MCNC: 1.9 MG/DL (ref 0.5–1.4)
EST. GFR  (NO RACE VARIABLE): 30.4 ML/MIN/1.73 M^2
GLUCOSE SERPL-MCNC: 133 MG/DL (ref 70–110)
INR PPP: 1 (ref 0.8–1.2)
MAGNESIUM SERPL-MCNC: 2.3 MG/DL (ref 1.6–2.6)
PHOSPHATE SERPL-MCNC: 2.8 MG/DL (ref 2.7–4.5)
POTASSIUM SERPL-SCNC: 4.5 MMOL/L (ref 3.5–5.1)
PROT SERPL-MCNC: 5.7 G/DL (ref 6–8.4)
PROTHROMBIN TIME: 10.3 SEC (ref 9–12.5)
SODIUM SERPL-SCNC: 142 MMOL/L (ref 136–145)

## 2023-05-01 PROCEDURE — 99232 SBSQ HOSP IP/OBS MODERATE 35: CPT | Mod: ,,, | Performed by: HOSPITALIST

## 2023-05-01 PROCEDURE — 94761 N-INVAS EAR/PLS OXIMETRY MLT: CPT

## 2023-05-01 PROCEDURE — 99232 PR SUBSEQUENT HOSPITAL CARE,LEVL II: ICD-10-PCS | Mod: ,,, | Performed by: HOSPITALIST

## 2023-05-01 PROCEDURE — 20600001 HC STEP DOWN PRIVATE ROOM

## 2023-05-01 PROCEDURE — 80053 COMPREHEN METABOLIC PANEL: CPT | Performed by: STUDENT IN AN ORGANIZED HEALTH CARE EDUCATION/TRAINING PROGRAM

## 2023-05-01 PROCEDURE — 94640 AIRWAY INHALATION TREATMENT: CPT

## 2023-05-01 PROCEDURE — 25000242 PHARM REV CODE 250 ALT 637 W/ HCPCS: Performed by: STUDENT IN AN ORGANIZED HEALTH CARE EDUCATION/TRAINING PROGRAM

## 2023-05-01 PROCEDURE — 84100 ASSAY OF PHOSPHORUS: CPT | Performed by: STUDENT IN AN ORGANIZED HEALTH CARE EDUCATION/TRAINING PROGRAM

## 2023-05-01 PROCEDURE — 25000003 PHARM REV CODE 250: Performed by: STUDENT IN AN ORGANIZED HEALTH CARE EDUCATION/TRAINING PROGRAM

## 2023-05-01 PROCEDURE — 25000003 PHARM REV CODE 250: Performed by: HOSPITALIST

## 2023-05-01 PROCEDURE — 99232 SBSQ HOSP IP/OBS MODERATE 35: CPT | Mod: ,,, | Performed by: INTERNAL MEDICINE

## 2023-05-01 PROCEDURE — 99900035 HC TECH TIME PER 15 MIN (STAT)

## 2023-05-01 PROCEDURE — 36415 COLL VENOUS BLD VENIPUNCTURE: CPT | Performed by: STUDENT IN AN ORGANIZED HEALTH CARE EDUCATION/TRAINING PROGRAM

## 2023-05-01 PROCEDURE — 85610 PROTHROMBIN TIME: CPT | Performed by: STUDENT IN AN ORGANIZED HEALTH CARE EDUCATION/TRAINING PROGRAM

## 2023-05-01 PROCEDURE — 83735 ASSAY OF MAGNESIUM: CPT | Performed by: STUDENT IN AN ORGANIZED HEALTH CARE EDUCATION/TRAINING PROGRAM

## 2023-05-01 PROCEDURE — 27000221 HC OXYGEN, UP TO 24 HOURS

## 2023-05-01 PROCEDURE — 99232 PR SUBSEQUENT HOSPITAL CARE,LEVL II: ICD-10-PCS | Mod: ,,, | Performed by: INTERNAL MEDICINE

## 2023-05-01 RX ORDER — BUMETANIDE 1 MG/1
2 TABLET ORAL 2 TIMES DAILY
Status: DISCONTINUED | OUTPATIENT
Start: 2023-05-01 | End: 2023-05-01

## 2023-05-01 RX ORDER — ESCITALOPRAM OXALATE 5 MG/1
5 TABLET ORAL DAILY
Qty: 30 TABLET | Refills: 2 | Status: SHIPPED | OUTPATIENT
Start: 2023-05-02 | End: 2023-05-24

## 2023-05-01 RX ORDER — HYDRALAZINE HYDROCHLORIDE 25 MG/1
25 TABLET, FILM COATED ORAL EVERY 8 HOURS
Qty: 90 TABLET | Refills: 11 | Status: ON HOLD | OUTPATIENT
Start: 2023-05-01 | End: 2023-05-15 | Stop reason: HOSPADM

## 2023-05-01 RX ORDER — BUMETANIDE 1 MG/1
4 TABLET ORAL 2 TIMES DAILY
Status: DISCONTINUED | OUTPATIENT
Start: 2023-05-01 | End: 2023-05-02 | Stop reason: HOSPADM

## 2023-05-01 RX ORDER — METOPROLOL SUCCINATE 100 MG/1
100 TABLET, EXTENDED RELEASE ORAL DAILY
Qty: 30 TABLET | Refills: 11 | Status: ON HOLD | OUTPATIENT
Start: 2023-05-02 | End: 2023-05-15 | Stop reason: HOSPADM

## 2023-05-01 RX ADMIN — APIXABAN 5 MG: 5 TABLET, FILM COATED ORAL at 10:05

## 2023-05-01 RX ADMIN — ESCITALOPRAM OXALATE 5 MG: 5 TABLET, FILM COATED ORAL at 08:05

## 2023-05-01 RX ADMIN — PANTOPRAZOLE SODIUM 40 MG: 40 TABLET, DELAYED RELEASE ORAL at 08:05

## 2023-05-01 RX ADMIN — HYDROCORTISONE: 25 CREAM TOPICAL at 09:05

## 2023-05-01 RX ADMIN — ISOSORBIDE DINITRATE 30 MG: 20 TABLET ORAL at 04:05

## 2023-05-01 RX ADMIN — APIXABAN 5 MG: 5 TABLET, FILM COATED ORAL at 09:05

## 2023-05-01 RX ADMIN — BUMETANIDE 2 MG: 1 TABLET ORAL at 10:05

## 2023-05-01 RX ADMIN — TIOTROPIUM BROMIDE INHALATION SPRAY 2 PUFF: 1.56 SPRAY, METERED RESPIRATORY (INHALATION) at 08:05

## 2023-05-01 RX ADMIN — AMIODARONE HYDROCHLORIDE 200 MG: 200 TABLET ORAL at 08:05

## 2023-05-01 RX ADMIN — MIRTAZAPINE 7.5 MG: 7.5 TABLET, FILM COATED ORAL at 09:05

## 2023-05-01 RX ADMIN — METOPROLOL SUCCINATE 100 MG: 100 TABLET, EXTENDED RELEASE ORAL at 08:05

## 2023-05-01 RX ADMIN — SACUBITRIL AND VALSARTAN 1 TABLET: 49; 51 TABLET, FILM COATED ORAL at 08:05

## 2023-05-01 RX ADMIN — HYDRALAZINE HYDROCHLORIDE 25 MG: 25 TABLET, FILM COATED ORAL at 04:05

## 2023-05-01 RX ADMIN — HYDRALAZINE HYDROCHLORIDE 25 MG: 25 TABLET, FILM COATED ORAL at 09:05

## 2023-05-01 RX ADMIN — HYDRALAZINE HYDROCHLORIDE 25 MG: 25 TABLET, FILM COATED ORAL at 05:05

## 2023-05-01 RX ADMIN — ISOSORBIDE DINITRATE 30 MG: 20 TABLET ORAL at 08:05

## 2023-05-01 RX ADMIN — BUMETANIDE 4 MG: 1 TABLET ORAL at 04:05

## 2023-05-01 RX ADMIN — SENNOSIDES AND DOCUSATE SODIUM 1 TABLET: 8.6; 5 TABLET ORAL at 09:05

## 2023-05-01 RX ADMIN — ISOSORBIDE DINITRATE 30 MG: 20 TABLET ORAL at 09:05

## 2023-05-01 RX ADMIN — ATORVASTATIN CALCIUM 40 MG: 20 TABLET, FILM COATED ORAL at 09:05

## 2023-05-01 NOTE — NURSING
Plan of care discussed with patient. VSS, pt weaned down to RA.  Patient remains free of falls and trauma. Patient ambulating independently, fall precautions in place. Patient has no complaints of pain. Discussed medications and care. Lasix IVP discontinued, PO bumex 4mg started. Patient has no questions at this time. Will continue to monitor.

## 2023-05-01 NOTE — PROGRESS NOTES
[facility] Hospital Medicine  Progress Note    Primary Team: [facility]  Admit Date: 4/27/2023    Subjective:        Interval History:  93-95% on room air after walking to and from the bathroom and brushing her teeth. Dyspnea and orthopnea improving. Change to PO Bumex 2 mg BID.       ROS:  As per HPI  General: no fever, no chills, no weight loss, no fatigue  Cardiovascular: no chest pain  Respiratory: no cough, no wheezes  All other systems reviewed & are negative.     Past Medical History:   Diagnosis Date    Anemia     Arthritis     Atrial fibrillation     OAC    Chronic respiratory failure with hypoxia, on home oxygen therapy     2L with activity, off at rest.  Per Pulm  no overt evidence of ILD or COPD on PFTs and CT to explain O2 needs.    CKD (chronic kidney disease), stage IV 05/08/2018    Congestive heart failure     s/p AICD placement,    Deep vein thrombosis     Depression     elevated bilirubin d/t Gilbert's syndrome     confirmed by [place] genetic testing, evaluated by hepatology    Encounter for blood transfusion     GERD (gastroesophageal reflux disease)     Hypertension     Pheochromocytoma, malignant     Right kidney mass     Sleep apnea     Thalassemia trait, alpha     Thyroid disease     Type 2 diabetes mellitus with hyperglycemia, without long-term current use of insulin 08/13/2020     Past Surgical History:   Procedure Laterality Date    APPENDECTOMY      BONE MARROW BIOPSY      CARDIAC DEFIBRILLATOR PLACEMENT Left 12/2016    CARDIAC ELECTROPHYSIOLOGY MAPPING AND ABLATION      CARDIAC ELECTROPHYSIOLOGY MAPPING AND ABLATION      COLONOSCOPY N/A 5/6/2022    Procedure: COLONOSCOPY;  Surgeon: Arely Betancourt MD;  Location: [facility] ([address] [place] [address]);  Service: Endoscopy;  Laterality: N/A;  heart transplant candidate/ EF 25% - 2nd floor/ defib - Biotronik - ERW  [place]en - per Dr. Cortez with [facility], Pt ok to hold Eliquis x 2 days prior-see media tab-outside correspondence dated  21  - ERW  verbal instructions/portal instructions/email instructions - s    EYE SURGERY      due to running tears    FRACTURE SURGERY Left     hand 5th digit    HYSTERECTOMY      KNEE SURGERY Left 2016    hematoma    LIVER BIOPSY  10/24/2018    Minimal steatosis, predominantly macrovesicular, 1%, Minimal nonspecific portal inflammation, no fibrosis. No findings on biopsy to explain elevated bilirubin levels. Could be d/t Gilbert's =?- hemolysis    RIGHT HEART CATHETERIZATION Right 2021    Procedure: INSERTION, CATHETER, RIGHT HEART;  Surgeon: Irving Cardenas MD;  Location: [facility] CATH LAB;  Service: Cardiology;  Laterality: Right;    RIGHT HEART CATHETERIZATION Right 2022    Procedure: INSERTION, CATHETER, RIGHT HEART;  Surgeon: Burke Camilo MD;  Location: [facility] CATH LAB;  Service: Cardiology;  Laterality: Right;    RIGHT HEART CATHETERIZATION Right 3/29/2023    Procedure: INSERTION, CATHETER, RIGHT HEART;  Surgeon: Katie Liriano DO;  Location: [facility] CATH LAB;  Service: Cardiology;  Laterality: Right;    TRANSJUGULAR BIOPSY OF LIVER N/A 10/24/2018    Procedure: BIOPSY, LIVER, TRANSJUGULAR APPROACH;  Surgeon: Carmen Diagnostic Provider;  Location: [facility] OR [address] [place] [address];  Service: Radiology;  Laterality: N/A;         Objective:   Last 24 Hour Vital Signs:  BP  Min: 97/54  Max: 132/83  Temp  Av.1 °F (36.7 °C)  Min: 97.6 °F (36.4 °C)  Max: 98.5 °F (36.9 °C)  Pulse  Av.4  Min: 73  Max: 92  Resp  Av.9  Min: 17  Max: 18  SpO2  Av.6 %  Min: 91 %  Max: 96 %  Weight  Av.2 kg (187 lb 13.3 oz)  Min: 85.2 kg (187 lb 13.3 oz)  Max: 85.2 kg (187 lb 13.3 oz)  I/O last 3 completed shifts:  In: 1460 [P.O.:1460]  Out: 1400 [Urine:1400]    Physical Examination:  GEN: AAOx3, NAD  HEENT: NCAT, MMM, PERRL, EOMI, oropharynx clear  CV: RRR, no m/r, no S3/S4  RESP: no crackles, no increased WOB  ABD: soft, NTND, normoactive BS, no organomegaly  EXTR: no c/c/e, intact distal pulses x 4  NEURO:  PERRL, EOMI, moving all four extremities, intact sensation to light touch, no focal deficits  SKIN: no rashes, lesions, or color changes  PSYCH: normal affect    Laboratory:  I have reviewed all pertinent lab results/findings within the past 24 hours.    Radiology:  I have reviewed all pertinent imaging results/findings within the past 24 hours.    Current Medications:     Infusions:         Scheduled:   amiodarone  200 mg Oral Daily    apixaban  5 mg Oral BID    atorvastatin  40 mg Oral QHS    bumetanide  2 mg Oral BID loop    EScitalopram oxalate  5 mg Oral Daily    hydrALAZINE  25 mg Oral Q8H    hydrocortisone   Rectal BID    isosorbide dinitrate  30 mg Oral TID    metoprolol succinate  100 mg Oral Daily    mirtazapine  7.5 mg Oral QHS    pantoprazole  40 mg Oral Daily    polyethylene glycol  17 g Oral BID    sacubitriL-valsartan  1 tablet Oral BID    senna-docusate 8.6-50 mg  1 tablet Oral BID    tiotropium bromide  2.5 mcg Inhalation Daily        PRN:  acetaminophen, albuterol, albuterol-ipratropium, ALPRAZolam, benzonatate, hydrALAZINE, melatonin, morphine, ondansetron, prochlorperazine, sodium chloride 0.9%, sodium chloride 0.9%, traZODone    Prior records reviewed.    3/2023 TTE  The left ventricle is severely enlarged with eccentric hypertrophy and severely decreased systolic function. The estimated ejection fraction is 15%.  Normal right ventricular size with moderately reduced right ventricular systolic function.  Grade II left ventricular diastolic dysfunction.  Biatrial enlargement.  Mild mitral regurgitation.  Small pericardial effusion.  The estimated PA systolic pressure is 51 mmHg (by tricuspid regurgitation velocity). The estimated mean PA pressure is 39mm Hg.  Intermediate central venous pressure (8 mmHg).       Assessment/Plan:     Hafsa Hawley is a 57 y.o.female with    Acute on chronic combined heart failure  -GDMT: metoprolol succinate 50mg qd, entresto 49-51 BID, Jardiance, isordil 30  TID.   -Home Diuretic: bumex 1mg BID  -Strict I/Os, daily weight  -Last RHC 3/29/23 RA 9, PCWP 16, PA 58/29, meann PA 39, PA sat 45%, AO sat 90% on RA, PVR 5.6 BOLDEN, CO/CI 4.1/2.03, SVR 1151, BP 88/58 , MAP 68, HR 75 SR Hb 10.7 Jayson 3.2  -Presented to selection committee 3/9/2022 declined due to renal function, lung function, and difficulty with consistent follow up with the HTS team  -Cardiology following: Lasix gtt 20 mg/hr > IVP 80 BID >  Bumex 2 BID     Pulmonary HTN  Unclear etiology of pulmonary hypertension but likely WHO group 2  Last RHC on 3/29/23 shows pre and post capillary PH     [place] on stage 3b chronic kidney disease  Baseline Cr of 1.7, 3.0 on admission  Resolved w/ diuresis    Physical deconditioning: OP PT and BSC ordered    Julia Barone MD  Hospital Medicine Staff  [facility]

## 2023-05-01 NOTE — PROGRESS NOTES
[facility]  Cardiology  Progress Note    Patient Name: Hafsa Hawley  MRN: [id number]  Admission Date: 4/27/2023  Hospital Length of Stay: 4 days  Code Status: Full Code   Attending Physician: Julia Barone MD   Primary Care Physician: Primary Doctor No  Expected Discharge Date: 5/2/2023  Principal Problem:Acute on chronic combined systolic and diastolic heart failure    Subjective:     Hospital Course:   No notes on file    Interval History: NAEON. Able to walk to the bathroom without significant dyspnea which is improvement. 10 lbs down since admission. Continues to diures well. No new complaints at this time. Still on supplemental NC.     Review of Systems   Constitutional: Negative for chills and fever.   HENT:  Negative for congestion.    Eyes:  Negative for blurred vision and double vision.   Cardiovascular:  Positive for dyspnea on exertion, leg swelling and orthopnea. Negative for chest pain.   Respiratory:  Positive for cough and shortness of breath.    Musculoskeletal:  Negative for back pain and myalgias.   Gastrointestinal:  Negative for abdominal pain, diarrhea, hematochezia, melena, nausea and vomiting.   Genitourinary:  Negative for flank pain and frequency.   Neurological:  Negative for dizziness and headaches.   All other systems reviewed and are negative.  Objective:     Vital Signs (Most Recent):  Temp: 98 °F (36.7 °C) (05/01/23 0728)  Pulse: 87 (05/01/23 0847)  Resp: 18 (05/01/23 0847)  BP: 132/83 (05/01/23 0728)  SpO2: 96 % (05/01/23 0847) Vital Signs (24h Range):  Temp:  [97.6 °F (36.4 °C)-98.5 °F (36.9 °C)] 98 °F (36.7 °C)  Pulse:  [73-92] 87  Resp:  [18] 18  SpO2:  [92 %-96 %] 96 %  BP: ()/(54-83) 132/83     Weight: 85.2 kg (187 lb 13.3 oz)  Body mass index is 30.32 kg/m².     SpO2: 96 %         Intake/Output Summary (Last 24 hours) at 5/1/2023 0979  Last data filed at 5/1/2023 0858  Gross per 24 hour   Intake 1820 ml   Output 900 ml   Net 920 ml        Lines/Drains/Airways       Peripheral Intravenous Line  Duration                  Peripheral IV - Single Lumen 04/26/23 0200 20 G Right Hand 5 days                    Physical Exam  Vitals and nursing note reviewed.   Constitutional:       General: She is not in acute distress.     Appearance: Normal appearance. She is obese. She is ill-appearing (chronic).   HENT:      Head: Normocephalic and atraumatic.   Eyes:      Extraocular Movements: Extraocular movements intact.      Pupils: Pupils are equal, round, and reactive to light.   Cardiovascular:      Rate and Rhythm: Normal rate and regular rhythm.      Pulses: Normal pulses.   Pulmonary:      Effort: Pulmonary effort is normal. No respiratory distress.      Comments: On NC  Abdominal:      General: There is no distension.      Palpations: Abdomen is soft.      Tenderness: There is no abdominal tenderness.   Musculoskeletal:      Right lower leg: Edema present.      Left lower leg: Edema present.   Skin:     General: Skin is warm and dry.   Neurological:      General: No focal deficit present.      Mental Status: She is alert and oriented to person, place, and time.       Significant Labs: CMP   Recent Labs   Lab 04/30/23  0443 05/01/23  0401    142   K 3.4* 4.5    106   CO2 32* 28   * 133*   BUN 64* 65*   CREATININE 1.8* 1.9*   CALCIUM 8.9 8.9   PROT 5.4* 5.7*   ALBUMIN 3.1* 3.2*   BILITOT 2.1* 1.9*   ALKPHOS 53* 57   AST 16 24   ALT 23 28   ANIONGAP 8 8    and CBC No results for input(s): WBC, HGB, HCT, PLT in the last 48 hours.    Significant Imaging: X-Ray: CXR: X-Ray Chest 1 View (CXR): No results found for this visit on 04/27/23.    Assessment and Plan:       Acute decompensated heart failure  57 YOF with hx of HFrEF 15%, NICM w/ ICD, COPD, CKD presenting with one week of SOB, LE edema, and distension along with worsening productive cough admitted for ADHF with COPD exacerbation. Likely trigger for ADHF is COPD exacerbation. Could  also be recent steroid use from gout treatment. No signs of cardiogenic shock. Diuresing well on lasix gtt.     Recommendations  -Restart home Bumex regimen  - Jardiance at discharge  - K>4, Mg >2  - HTS referral to see if she is a candidate for cardioMEMs  - Outpatient clinic device check referral for her ICD as she missed her appointment while inpatient          VTE Risk Mitigation (From admission, onward)         Ordered     apixaban tablet 5 mg  2 times daily         05/01/23 1017     IP VTE HIGH RISK PATIENT  Once         04/27/23 1031     Place sequential compression device  Until discontinued         04/27/23 0417                Christopher Bailey,   Cardiology  [facility]

## 2023-05-01 NOTE — ASSESSMENT & PLAN NOTE
57 YOF with hx of HFrEF 15%, NICM w/ ICD, COPD, CKD presenting with one week of SOB, LE edema, and distension along with worsening productive cough admitted for ADHF with COPD exacerbation. Likely trigger for ADHF is COPD exacerbation. Could also be recent steroid use from gout treatment. No signs of cardiogenic shock. Diuresing well on lasix gtt.     Recommendations  -Restart home Bumex regimen  - Jardiance at discharge  - K>4, Mg >2  - HTS referral to see if she is a candidate for cardioMEMs  - Outpatient clinic device check referral for her ICD as she missed her appointment while inpatient

## 2023-05-01 NOTE — PROGRESS NOTES
05/01/23 1523   Vital Signs   Temp 98.3 °F (36.8 °C)   Temp Source Oral   Pulse 78   Heart Rate Source Monitor   Resp 17   SpO2 (!) 91 %   BP (!) 98/57   MAP (mmHg) 72   BP Location Right arm   BP Method Automatic   Patient Position Lying     Pt's BP soft compared to trending baseline.  notified, ordered to hold scheduled hydralazine and isosorbide for now and recheck BP manually in 1hr. PO bumex also increased to 4mg due to low UOP throughout shift. Will continue to monitor.

## 2023-05-01 NOTE — SUBJECTIVE & OBJECTIVE
Interval History: NAEON. Able to walk to the bathroom without significant dyspnea which is improvement. 10 lbs down since admission. Continues to diures well. No new complaints at this time. Still on supplemental NC.     Review of Systems   Constitutional: Negative for chills and fever.   HENT:  Negative for congestion.    Eyes:  Negative for blurred vision and double vision.   Cardiovascular:  Positive for dyspnea on exertion, leg swelling and orthopnea. Negative for chest pain.   Respiratory:  Positive for cough and shortness of breath.    Musculoskeletal:  Negative for back pain and myalgias.   Gastrointestinal:  Negative for abdominal pain, diarrhea, hematochezia, melena, nausea and vomiting.   Genitourinary:  Negative for flank pain and frequency.   Neurological:  Negative for dizziness and headaches.   All other systems reviewed and are negative.  Objective:     Vital Signs (Most Recent):  Temp: 98 °F (36.7 °C) (05/01/23 0728)  Pulse: 87 (05/01/23 0847)  Resp: 18 (05/01/23 0847)  BP: 132/83 (05/01/23 0728)  SpO2: 96 % (05/01/23 0847) Vital Signs (24h Range):  Temp:  [97.6 °F (36.4 °C)-98.5 °F (36.9 °C)] 98 °F (36.7 °C)  Pulse:  [73-92] 87  Resp:  [18] 18  SpO2:  [92 %-96 %] 96 %  BP: ()/(54-83) 132/83     Weight: 85.2 kg (187 lb 13.3 oz)  Body mass index is 30.32 kg/m².     SpO2: 96 %         Intake/Output Summary (Last 24 hours) at 5/1/2023 0925  Last data filed at 5/1/2023 0858  Gross per 24 hour   Intake 1820 ml   Output 900 ml   Net 920 ml       Lines/Drains/Airways       Peripheral Intravenous Line  Duration                  Peripheral IV - Single Lumen 04/26/23 0200 20 G Right Hand 5 days                    Physical Exam  Vitals and nursing note reviewed.   Constitutional:       General: She is not in acute distress.     Appearance: Normal appearance. She is obese. She is ill-appearing (chronic).   HENT:      Head: Normocephalic and atraumatic.   Eyes:      Extraocular Movements: Extraocular  movements intact.      Pupils: Pupils are equal, round, and reactive to light.   Cardiovascular:      Rate and Rhythm: Normal rate and regular rhythm.      Pulses: Normal pulses.   Pulmonary:      Effort: Pulmonary effort is normal. No respiratory distress.      Comments: On NC  Abdominal:      General: There is no distension.      Palpations: Abdomen is soft.      Tenderness: There is no abdominal tenderness.   Musculoskeletal:      Right lower leg: Edema present.      Left lower leg: Edema present.   Skin:     General: Skin is warm and dry.   Neurological:      General: No focal deficit present.      Mental Status: She is alert and oriented to person, place, and time.       Significant Labs: CMP   Recent Labs   Lab 04/30/23  0443 05/01/23  0401    142   K 3.4* 4.5    106   CO2 32* 28   * 133*   BUN 64* 65*   CREATININE 1.8* 1.9*   CALCIUM 8.9 8.9   PROT 5.4* 5.7*   ALBUMIN 3.1* 3.2*   BILITOT 2.1* 1.9*   ALKPHOS 53* 57   AST 16 24   ALT 23 28   ANIONGAP 8 8    and CBC No results for input(s): WBC, HGB, HCT, PLT in the last 48 hours.    Significant Imaging: X-Ray: CXR: X-Ray Chest 1 View (CXR): No results found for this visit on 04/27/23.

## 2023-05-01 NOTE — PROGRESS NOTES
05/01/23 1109   Vital Signs   Pulse 84   Heart Rate Source Monitor   SpO2 (!) 93 %   Device (Oxygen Therapy) room air   /81   MAP (mmHg) 99   BP Location Left arm   BP Method Automatic   Patient Position Sitting     Received call from tele. Pt had 19 beat run of vtach with HR increasing to the 170s. Pt found sitting in chair, no acute distress noted. Pt did state that she felt a little lightheaded before RN entered room. VSS. Dr. Barone notified, awaiting to hear back. Will continue to monitor.

## 2023-05-02 VITALS
RESPIRATION RATE: 16 BRPM | WEIGHT: 188.25 LBS | SYSTOLIC BLOOD PRESSURE: 121 MMHG | DIASTOLIC BLOOD PRESSURE: 76 MMHG | HEART RATE: 72 BPM | BODY MASS INDEX: 30.25 KG/M2 | HEIGHT: 66 IN | OXYGEN SATURATION: 93 % | TEMPERATURE: 98 F

## 2023-05-02 LAB
ALBUMIN SERPL BCP-MCNC: 3.1 G/DL (ref 3.5–5.2)
ALP SERPL-CCNC: 44 U/L (ref 55–135)
ALT SERPL W/O P-5'-P-CCNC: 35 U/L (ref 10–44)
ANION GAP SERPL CALC-SCNC: 11 MMOL/L (ref 8–16)
AST SERPL-CCNC: 37 U/L (ref 10–40)
BILIRUB SERPL-MCNC: 2.1 MG/DL (ref 0.1–1)
BUN SERPL-MCNC: 65 MG/DL (ref 6–20)
CALCIUM SERPL-MCNC: 8.8 MG/DL (ref 8.7–10.5)
CHLORIDE SERPL-SCNC: 106 MMOL/L (ref 95–110)
CO2 SERPL-SCNC: 24 MMOL/L (ref 23–29)
CREAT SERPL-MCNC: 1.5 MG/DL (ref 0.5–1.4)
EST. GFR  (NO RACE VARIABLE): 40.4 ML/MIN/1.73 M^2
GLUCOSE SERPL-MCNC: 104 MG/DL (ref 70–110)
INR PPP: 1 (ref 0.8–1.2)
MAGNESIUM SERPL-MCNC: 2.3 MG/DL (ref 1.6–2.6)
PHOSPHATE SERPL-MCNC: 3 MG/DL (ref 2.7–4.5)
POTASSIUM SERPL-SCNC: 3.8 MMOL/L (ref 3.5–5.1)
PROT SERPL-MCNC: 5.4 G/DL (ref 6–8.4)
PROTHROMBIN TIME: 10.6 SEC (ref 9–12.5)
SODIUM SERPL-SCNC: 141 MMOL/L (ref 136–145)

## 2023-05-02 PROCEDURE — 25000003 PHARM REV CODE 250: Performed by: HOSPITALIST

## 2023-05-02 PROCEDURE — 94640 AIRWAY INHALATION TREATMENT: CPT

## 2023-05-02 PROCEDURE — 83735 ASSAY OF MAGNESIUM: CPT | Performed by: STUDENT IN AN ORGANIZED HEALTH CARE EDUCATION/TRAINING PROGRAM

## 2023-05-02 PROCEDURE — 36415 COLL VENOUS BLD VENIPUNCTURE: CPT | Performed by: STUDENT IN AN ORGANIZED HEALTH CARE EDUCATION/TRAINING PROGRAM

## 2023-05-02 PROCEDURE — 80053 COMPREHEN METABOLIC PANEL: CPT | Performed by: STUDENT IN AN ORGANIZED HEALTH CARE EDUCATION/TRAINING PROGRAM

## 2023-05-02 PROCEDURE — 99239 HOSP IP/OBS DSCHRG MGMT >30: CPT | Mod: ,,, | Performed by: HOSPITALIST

## 2023-05-02 PROCEDURE — 84100 ASSAY OF PHOSPHORUS: CPT | Performed by: STUDENT IN AN ORGANIZED HEALTH CARE EDUCATION/TRAINING PROGRAM

## 2023-05-02 PROCEDURE — 99239 PR HOSPITAL DISCHARGE DAY,>30 MIN: ICD-10-PCS | Mod: ,,, | Performed by: HOSPITALIST

## 2023-05-02 PROCEDURE — 25000003 PHARM REV CODE 250: Performed by: STUDENT IN AN ORGANIZED HEALTH CARE EDUCATION/TRAINING PROGRAM

## 2023-05-02 PROCEDURE — 85610 PROTHROMBIN TIME: CPT | Performed by: STUDENT IN AN ORGANIZED HEALTH CARE EDUCATION/TRAINING PROGRAM

## 2023-05-02 PROCEDURE — 94761 N-INVAS EAR/PLS OXIMETRY MLT: CPT

## 2023-05-02 RX ORDER — SACUBITRIL AND VALSARTAN 97; 103 MG/1; MG/1
1 TABLET, FILM COATED ORAL 2 TIMES DAILY
Qty: 60 TABLET | Refills: 11 | Status: ON HOLD | OUTPATIENT
Start: 2023-05-02 | End: 2023-05-15 | Stop reason: HOSPADM

## 2023-05-02 RX ORDER — BUMETANIDE 2 MG/1
4 TABLET ORAL 2 TIMES DAILY
Qty: 120 TABLET | Refills: 11 | Status: ON HOLD | OUTPATIENT
Start: 2023-05-02 | End: 2023-05-15 | Stop reason: SDUPTHER

## 2023-05-02 RX ADMIN — ESCITALOPRAM OXALATE 5 MG: 5 TABLET, FILM COATED ORAL at 08:05

## 2023-05-02 RX ADMIN — PANTOPRAZOLE SODIUM 40 MG: 40 TABLET, DELAYED RELEASE ORAL at 08:05

## 2023-05-02 RX ADMIN — SACUBITRIL AND VALSARTAN 1 TABLET: 49; 51 TABLET, FILM COATED ORAL at 08:05

## 2023-05-02 RX ADMIN — HYDRALAZINE HYDROCHLORIDE 25 MG: 25 TABLET, FILM COATED ORAL at 05:05

## 2023-05-02 RX ADMIN — APIXABAN 5 MG: 5 TABLET, FILM COATED ORAL at 08:05

## 2023-05-02 RX ADMIN — HYDROCORTISONE: 25 CREAM TOPICAL at 09:05

## 2023-05-02 RX ADMIN — ISOSORBIDE DINITRATE 30 MG: 20 TABLET ORAL at 08:05

## 2023-05-02 RX ADMIN — METOPROLOL SUCCINATE 100 MG: 100 TABLET, EXTENDED RELEASE ORAL at 08:05

## 2023-05-02 RX ADMIN — BUMETANIDE 4 MG: 1 TABLET ORAL at 08:05

## 2023-05-02 RX ADMIN — AMIODARONE HYDROCHLORIDE 200 MG: 200 TABLET ORAL at 08:05

## 2023-05-02 RX ADMIN — TIOTROPIUM BROMIDE INHALATION SPRAY 2 PUFF: 1.56 SPRAY, METERED RESPIRATORY (INHALATION) at 09:05

## 2023-05-02 NOTE — NURSING
Patient is ready for discharge. Patient stable alert and oriented. IVs removed. No complaints of pain. Discussed discharge plan. Reviewed medications and side effects, appointments, and answered questions with patient and family. Pt left via wheelchair with  present at bedside. All belongings accounted for.

## 2023-05-02 NOTE — DISCHARGE SUMMARY
[facility] - Cardiology [facility]  Discharge Summary      Patient Name: Hafsa Hawley  MRN: [id number]  GLENN: [id number]  Patient Class: IP- Inpatient  Admission Date: 4/27/2023  Hospital Length of Stay: 5 days  Discharge Date and Time:  05/02/2023 11:03 AM  Attending Physician: Julia Barone MD   Discharging Provider: Julia Barone MD  Primary Care Provider: Primary Doctor [facility] Medicine Team: [facility] Julia Barone MD  Primary Care Team: [facility]    HPI:   Ms. Hafsa Hawley is a 57 year old woman with PMH of HTN, COPD, CKD, NICM s/p ICD, HFrEF (EF 15%, LVEDD 7.3cm), permanent AF( on eliquis), Gilbert's syndrome, pulmonary hypertension, PSVT, MELISSA (nonadherent to CPAP), and GERD who presented to [facility] on 4/26/23 with chief complaint of weakness and SOB. Associated symptoms include vomiting, fatigue, chest discomfort, and HA. Family reports decreased urine output as well. Patient reports that she has had difficulty ambulating and performing ADLS for the past 2 days secondary to both shortness of breath and pain in her bilateral feet, she thinks it was a gout flare. The day prior arrival, she experienced chest pressure that worsened the following morning. Her primary cardiologist is Dr. Benson guajardo.  She also follows HTS in clinic, was stable at her last visit on 4/17/23.  Current regimen includes metoprolol succinate 50 mg daily, entresto 49/51mg BID, jardiance. Not on MRA due to renal funciton. Volume mgmt with Bumex 1 mg BID.     Of note the patient was presented to OHTx committee on 3/29/23 for a second time and was declined for Heart Transplant listing due to poor medical adherence, declined pulmonary function and declined renal function. She has been evaluated by pulmonology with no evidence of ILD or COPD on PFTs, chronic hypoxia and hypercapneic respiratory failure likely related to OHS/MELISSA.    In the [facility] ED, vital signs showed BP 140s/90s, HR  , RR 16-20 and O2 sat 92-95% on RA. Labwork significant for WBC 6.88, Hb 11, Na 14, K 4.8, BUN 81, Cr 3.0 (baseline Cr 2.1-2.5), CO2 18, uric acid 13.6, Bili 3.2, AST/ALT wnl, BNP > 4,900, CPK 31, troponin 0.900, lactate 2.1. EKG NSR. Flu and COVID negative. CXR showed vascular congestion and mild edema. HCT was nonacute. Blood cultures were collected and patient was given 1 L NS bolus, ASA, statin, and nitro paste. She was also given protonix and IV zofran. She was later given 40 mg IV lasix. Dr. Adriano Low ([facility]) contacted [facility] to request patient transfer to higher level of care for cardiology and HTS consultation for evaluation and management of ADHF and pulmonary hypertension. [facility] notified Dr. Peck ([facility]) and the case was discussed, the patient was accepted by Dr. Peck for cardiology evaluation.  Hospital Medicine consulted for direct admission.         * No surgery found *      Hospital Course:   Acute on chronic combined heart failure  -GDMT: Toprol 50, entresto BID, Jardiance, Isordil 30 TID. Added hydralazine, increased Entresto to 97mg, and Toprol to 100 mg  -Last RHC 3/29/23 RA 9, PCWP 16, PA 58/29, meann PA 39, PA sat 45%, AO sat 90% on RA, PVR 5.6 BOLDEN, CO/CI 4.1/2.03, SVR 1151, BP 88/58 , MAP 68, HR 75 SR Hb 10.7 Jayson 3.2  -Presented to selection committee 3/9/2022 declined due to renal function, lung function, and difficulty with consistent follow up with the HTS team  -Cardiology consulted: Lasix gtt 20 mg/hr > IVP 80 BID >  Bumex 4 BID (did not respond well to trial of 2 mg)  -Diuresed well. New dry weight of 188lb.  -Counseled on fluid/Na restriction & daily weights  -Has upcoming f/u with [facility]     Pulmonary HTN  Unclear etiology of pulmonary hypertension but likely WHO group 2  Last RHC on 3/29/23 shows pre and post capillary PH     [place] on stage 3b chronic kidney disease  3.0 on admission. Baseline 1.5  Resolved w/ diuresis     Depression      Started Lexapro per her  "request. Discussed side effects and need for outpatient monitoring.       Goals of Care Treatment Preferences:  Code Status: Full Code    Health care agent: Erika Estrada  [facility] care agent number: No value filed.          What is most important right now is to focus on curative/life-prolongation (regardless of treatment burdens).  Accordingly, we have decided that the best plan to meet the patient's goals includes continuing with treatment.      Consults:   Consults (From admission, onward)        Status Ordering Provider     Inpatient consult to Registered Dietitian/Nutritionist  Once        Provider:  (Not yet assigned)    Completed HOLLIE EWING     Inpatient consult to Cardiology  Once        Provider:  (Not yet assigned)    Completed CONSTANTINE PARSONS          No new Assessment & Plan notes have been filed under this hospital service since the last note was generated.  Service: Hospital Medicine    Final Active Diagnoses:    Diagnosis Date Noted POA    PRINCIPAL PROBLEM:  Acute on chronic combined systolic and diastolic heart failure [I50.43] 11/30/2021 Yes    COPD exacerbation [J44.1] 04/27/2023 Unknown    Asymptomatic bacteriuria [R82.71] 04/27/2023 Unknown    Pulmonary HTN [I27.20] 01/06/2023 Yes     Chronic    Acute decompensated heart failure [I50.9] 11/18/2022 Yes    Stage 3b chronic kidney disease [N18.32] 08/23/2022 Yes    Elevated troponin [R77.8] 07/20/2016 Yes      Problems Resolved During this Admission:       Discharged Condition: good    Disposition: Home or Self Care    Follow Up:   Follow-up Information     Primary Doctor No. Schedule an appointment as soon as possible for a visit.                     Patient Instructions:      COMMODE FOR HOME USE     Order Specific Question Answer Comments   Type: Standard    Height: 5' 6" (1.676 m)    Weight: 86.7 kg (191 lb 2.2 oz)    Does patient have medical equipment at home? rollator    Does patient have medical equipment at home? shower chair  "   Does patient have medical equipment at home? oxygen    Length of need (1-99 months): 99    Vendor: [facility] Doesn't want to pay for it at this time   CRM Resolution Date: 5/1/2023      Ambulatory referral/consult to Physical/Occupational Therapy   Standing Status: Future   Referral Priority: Routine Referral Type: Physical Medicine   Referral Reason: Specialty Services Required   Requested Specialty: Physical Therapy   Number of Visits Requested: 1     Diet Cardiac   Order Comments: Do not consume more than 1500 mL fluid and 2 grams of sodium daily.     Notify your health care provider if you experience any of the following:  persistent dizziness, light-headedness, or visual disturbances     Notify your health care provider if you experience any of the following:  difficulty breathing or increased cough     Activity as tolerated       Significant Diagnostic Studies: Labs:   CMP   Recent Labs   Lab 05/01/23  0401 05/02/23  0442    141   K 4.5 3.8    106   CO2 28 24   * 104   BUN 65* 65*   CREATININE 1.9* 1.5*   CALCIUM 8.9 8.8   PROT 5.7* 5.4*   ALBUMIN 3.2* 3.1*   BILITOT 1.9* 2.1*   ALKPHOS 57 44*   AST 24 37   ALT 28 35   ANIONGAP 8 11    and CBC No results for input(s): WBC, HGB, HCT, PLT in the last 48 hours.    Pending Diagnostic Studies:     None         Medications:  Reconciled Home Medications:      Medication List      START taking these medications    ENTRESTO  mg per tablet  Generic drug: sacubitriL-valsartan  Take 1 tablet by mouth 2 (two) times daily.  Replaces: ENTRESTO 49-51 mg per tablet     EScitalopram oxalate 5 MG Tab  Commonly known as: LEXAPRO  Take 1 tablet (5 mg total) by mouth once daily.     hydrALAZINE 25 MG tablet  Commonly known as: APRESOLINE  Take 1 tablet (25 mg total) by mouth every 8 (eight) hours.        CHANGE how you take these medications    bumetanide 2 MG tablet  Commonly known as: BUMEX  Take 2 tablets (4 mg total) by mouth 2 (two) times a  day.  What changed: how much to take     empagliflozin 10 mg tablet  Commonly known as: JARDIANCE  Take 1 tablet (10 mg total) by mouth once daily.  What changed:   · how much to take  · when to take this     ergocalciferol 50,000 unit Cap  Commonly known as: ERGOCALCIFEROL  Take 1 capsule (50,000 Units total) by mouth every 7 days.  What changed: when to take this     metoprolol succinate 100 MG 24 hr tablet  Commonly known as: TOPROL-XL  Take 1 tablet (100 mg total) by mouth once daily.  What changed:   · medication strength  · how much to take        CONTINUE taking these medications    albuterol-ipratropium 2.5 mg-0.5 mg/3 mL nebulizer solution  Commonly known as: DUO-NEB  Take 3 mLs by nebulization 2 (two) times a day.     allopurinoL 100 MG tablet  Commonly known as: ZYLOPRIM  Take 1 tablet (100 mg total) by mouth daily as needed (gout attack).     ALPRAZolam 2 MG Tab  Commonly known as: XANAX  Take 2 mg by mouth 2 (two) times daily.     amiodarone 200 MG Tab  Commonly known as: PACERONE  Take 200 mg by mouth.     atorvastatin 40 MG tablet  Commonly known as: LIPITOR  Take 1 tablet (40 mg total) by mouth every evening.     b complex vitamins tablet  Take 1 tablet by mouth once daily.     blood sugar diagnostic Strp  Commonly known as: ACCU-CHEK GUIDE TEST STRIPS  1 strip by Other route 3 (three) times daily.     cetirizine 10 MG tablet  Commonly known as: ZYRTEC  Take 10 mg by mouth daily as needed for Allergies.     diclofenac sodium 1 % Gel  Commonly known as: VOLTAREN  APPLY 2 G TOPICALLY 3 (THREE) TIMES DAILY AS NEEDED (PAIN).     DULERA 200-5 mcg/actuation inhaler  Generic drug: mometasone-formoterol  Inhale 2 puffs into the lungs 2 (two) times daily. Controller     ELIQUIS 5 mg Tab  Generic drug: apixaban  TAKE 1 TABLET (5 MG TOTAL) BY MOUTH 2 (TWO) TIMES DAILY.     fluticasone propionate 50 mcg/actuation nasal spray  Commonly known as: FLONASE  2 sprays by Each Nostril route daily as needed for  Rhinitis.     isosorbide dinitrate 30 MG Tab  Commonly known as: ISORDIL  Take 1 tablet (30 mg total) by mouth 3 (three) times daily.     lancets Misc  Commonly known as: ACCU-CHEK SOFTCLIX LANCETS  1 Device by Misc.(Non-Drug; Combo Route) route 3 (three) times daily.     LIDOcaine 5 %  Commonly known as: LIDODERM  Place 1 patch onto the skin daily as needed (pain). Remove & Discard patch within 12 hours or as directed by MD     mirtazapine 7.5 MG Tab  Commonly known as: REMERON  Take 1 tablet (7.5 mg total) by mouth every evening.     ondansetron 4 MG Tbdl  Commonly known as: ZOFRAN-ODT  TAKE 1 TABLET (4 MG TOTAL) BY MOUTH 2 (TWO) TIMES DAILY.     pantoprazole 40 MG tablet  Commonly known as: PROTONIX  TAKE 1 TABLET BY MOUTH DAILY IN THE MORNING     potassium chloride 10 MEQ Cpsr  Commonly known as: MICRO-K  Take 1 capsule (10 mEq total) by mouth once daily.     pregabalin 25 MG capsule  Commonly known as: LYRICA  Take 1 capsule (25 mg total) by mouth 2 (two) times daily.     semaglutide 0.25 mg or 0.5 mg (2 mg/3 mL) pen injector  Commonly known as: OZEMPIC  Inject 0.5 mg into the skin every 7 days.     SPIRIVA RESPIMAT 1.25 mcg/actuation inhaler  Generic drug: tiotropium bromide  Inhale 2 puffs into the lungs once daily. Controller     traZODone 50 MG tablet  Commonly known as: DESYREL  Take 25 mg by mouth nightly as needed.     VENTOLIN HFA 90 mcg/actuation inhaler  Generic drug: albuterol  Inhale 2 puffs into the lungs every 6 (six) hours as needed for Shortness of Breath or Wheezing.        STOP taking these medications    chlorthalidone 25 MG Tab  Commonly known as: HYGROTEN     colchicine 0.6 mg tablet  Commonly known as: COLCRYS     ENTRESTO 49-51 mg per tablet  Generic drug: sacubitriL-valsartan  Replaced by: ENTRESTO  mg per tablet     predniSONE 20 MG tablet  Commonly known as: DELTASONE     promethazine-codeine 6.25-10 mg/5 ml 6.25-10 mg/5 mL syrup  Commonly known as: PHENERGAN with CODEINE             Indwelling Lines/Drains at time of discharge:   Lines/Drains/Airways     None                 Time spent on the discharge of patient: 35 minutes         Julia Barone MD  Department of Hospital Medicine  [facility] - Cardiology Stepdown

## 2023-05-02 NOTE — HOSPITAL COURSE
Acute on chronic combined heart failure  -GDMT: Toprol 50, entresto BID, Jardiance, Isordil 30 TID. Added hydralazine, increased Entresto to 97mg, and Toprol to 100 mg  -Last RHC 3/29/23 RA 9, PCWP 16, PA 58/29, meann PA 39, PA sat 45%, AO sat 90% on RA, PVR 5.6 BOLDEN, CO/CI 4.1/2.03, SVR 1151, BP 88/58 , MAP 68, HR 75 SR Hb 10.7 Jayson 3.2  -Presented to selection committee 3/9/2022 declined due to renal function, lung function, and difficulty with consistent follow up with the HTS team  -Cardiology consulted: Lasix gtt 20 mg/hr > IVP 80 BID >  Bumex 4 BID (did not respond well to trial of 2 mg)  -Diuresed well. New dry weight of 188lb.  -Counseled on fluid/Na restriction & daily weights  -Has upcoming f/u with [facility]     Pulmonary HTN  Unclear etiology of pulmonary hypertension but likely WHO group 2  Last RHC on 3/29/23 shows pre and post capillary PH     [place] on stage 3b chronic kidney disease  3.0 on admission. Baseline 1.5  Resolved w/ diuresis     Depression      Started Lexapro per her request. Discussed side effects and need for outpatient monitoring.

## 2023-05-02 NOTE — DISCHARGE INSTRUCTIONS
Your goal weight is 187-188lb.    Weigh yourself daily first thing in the morning (before eating and after urinating) and record your weights.    Take an additional dose of Bumex if you gain 3 pounds in 1 day, or 5 pounds in 1 week. Take the extra dose daily until your weight returns to your goal weight - 187-188 lb).    Do not consume more than 1500 mL fluid and 2 grams of sodium daily.

## 2023-05-02 NOTE — PLAN OF CARE
Pt discharged home with no post-acute needs. Bedside Commode ordered but Pt declined.     Emelia Musa, [facility]  Case Management Department  [email or web address]    [facility] - Cardiology Stepdown  Discharge Final Note    Primary Care Provider: Primary Doctor No    Expected Discharge Date: 5/2/2023    Final Discharge Note (most recent)       Final Note - 05/02/23 1008          Final Note    Assessment Type Final Discharge Note     Anticipated Discharge Disposition Home or Self Care     What phone number can be called within the next 1-3 days to see how you are doing after discharge? [phone]     Hospital Resources/Appts/Education Provided Provided patient/caregiver with written discharge plan information;Appointments scheduled and added to AVS;Appointments scheduled by Navigator/Coordinator        Post-Acute Status    Post-Acute Authorization HME     HME Status Patient declined/refused     Discharge Delays None known at this time                     Important Message from Medicare             Contact Info       No, Primary Doctor   Relationship: PCP - General        Next Steps: Schedule an appointment as soon as possible for a visit          Future Appointments   Date Time Provider Department Center   5/10/2023  9:35 AM LAB, APPOINTMENT [facility] LAB [facility]   5/10/2023 11:00 AM Jake Orozco Jr., MD [facility] HEARTTX [facility]   5/24/2023  9:00 AM Sandra Hernandes MD [facility] PAL MED [facility]   6/6/2023  2:15 PM Cristobal Ann MD [facility] FAM MED [place]   6/19/2023  1:30 PM Tatiana Fortune NP [facility] CARD [facility]   7/19/2023  9:40 AM COORDINATED DEVICE CHECK NOM ARRHPRO [facility]   7/19/2023 10:15 AM EKG, APPT NOM EKG [facility]   7/19/2023 11:00 AM Bob Pretty MD [facility] ARRHYTH [facility]   7/25/2023 10:00 AM HOME MONITOR DEVICE CHECK, [facility] NOM ARRHPRO [facility]   8/7/2023 10:00 AM Uziel Herman MD [facility] ENDOCR [facility]   10/10/2023  1:30 PM Cyndee Matston MD [facility] PULM [place] ACT

## 2023-05-03 ENCOUNTER — PATIENT MESSAGE (OUTPATIENT)
Dept: ADMINISTRATIVE | Facility: CLINIC | Age: 58
End: 2023-05-03
Payer: MEDICAID

## 2023-05-03 ENCOUNTER — PATIENT OUTREACH (OUTPATIENT)
Dept: ADMINISTRATIVE | Facility: CLINIC | Age: 58
End: 2023-05-03
Payer: MEDICAID

## 2023-05-03 ENCOUNTER — OFFICE VISIT (OUTPATIENT)
Dept: FAMILY MEDICINE | Facility: CLINIC | Age: 58
End: 2023-05-03
Payer: MEDICAID

## 2023-05-03 VITALS
HEART RATE: 96 BPM | SYSTOLIC BLOOD PRESSURE: 132 MMHG | RESPIRATION RATE: 24 BRPM | WEIGHT: 189.25 LBS | DIASTOLIC BLOOD PRESSURE: 84 MMHG | HEIGHT: 66 IN | BODY MASS INDEX: 30.41 KG/M2

## 2023-05-03 DIAGNOSIS — G89.29 CHRONIC FOOT PAIN, RIGHT: ICD-10-CM

## 2023-05-03 DIAGNOSIS — M51.36 LUMBAR DEGENERATIVE DISC DISEASE: ICD-10-CM

## 2023-05-03 DIAGNOSIS — G89.29 CHRONIC PAIN OF RIGHT KNEE: Primary | ICD-10-CM

## 2023-05-03 DIAGNOSIS — G62.9 NEUROPATHY: ICD-10-CM

## 2023-05-03 DIAGNOSIS — I50.42 CHRONIC COMBINED SYSTOLIC AND DIASTOLIC HEART FAILURE: Chronic | ICD-10-CM

## 2023-05-03 DIAGNOSIS — J96.12 CHRONIC RESPIRATORY FAILURE WITH HYPOXIA AND HYPERCAPNIA: ICD-10-CM

## 2023-05-03 DIAGNOSIS — R53.81 DEBILITY: ICD-10-CM

## 2023-05-03 DIAGNOSIS — G89.29 CHRONIC PAIN IN LEFT FOOT: ICD-10-CM

## 2023-05-03 DIAGNOSIS — Z09 HOSPITAL DISCHARGE FOLLOW-UP: ICD-10-CM

## 2023-05-03 DIAGNOSIS — J96.11 CHRONIC RESPIRATORY FAILURE WITH HYPOXIA AND HYPERCAPNIA: ICD-10-CM

## 2023-05-03 DIAGNOSIS — I42.8 NICM (NONISCHEMIC CARDIOMYOPATHY): Chronic | ICD-10-CM

## 2023-05-03 DIAGNOSIS — M79.672 CHRONIC PAIN IN LEFT FOOT: ICD-10-CM

## 2023-05-03 DIAGNOSIS — M25.561 CHRONIC PAIN OF RIGHT KNEE: Primary | ICD-10-CM

## 2023-05-03 DIAGNOSIS — J98.4 RESTRICTIVE LUNG DISEASE: ICD-10-CM

## 2023-05-03 DIAGNOSIS — M79.671 CHRONIC FOOT PAIN, RIGHT: ICD-10-CM

## 2023-05-03 PROCEDURE — 1159F PR MEDICATION LIST DOCUMENTED IN MEDICAL RECORD: ICD-10-PCS | Mod: CPTII,,, | Performed by: STUDENT IN AN ORGANIZED HEALTH CARE EDUCATION/TRAINING PROGRAM

## 2023-05-03 PROCEDURE — 99999 PR PBB SHADOW E&M-EST. PATIENT-LVL V: CPT | Mod: PBBFAC,,, | Performed by: STUDENT IN AN ORGANIZED HEALTH CARE EDUCATION/TRAINING PROGRAM

## 2023-05-03 PROCEDURE — 3044F PR MOST RECENT HEMOGLOBIN A1C LEVEL <7.0%: ICD-10-PCS | Mod: CPTII,,, | Performed by: STUDENT IN AN ORGANIZED HEALTH CARE EDUCATION/TRAINING PROGRAM

## 2023-05-03 PROCEDURE — 3008F BODY MASS INDEX DOCD: CPT | Mod: CPTII,,, | Performed by: STUDENT IN AN ORGANIZED HEALTH CARE EDUCATION/TRAINING PROGRAM

## 2023-05-03 PROCEDURE — 99999 PR PBB SHADOW E&M-EST. PATIENT-LVL V: ICD-10-PCS | Mod: PBBFAC,,, | Performed by: STUDENT IN AN ORGANIZED HEALTH CARE EDUCATION/TRAINING PROGRAM

## 2023-05-03 PROCEDURE — 4010F ACE/ARB THERAPY RXD/TAKEN: CPT | Mod: CPTII,,, | Performed by: STUDENT IN AN ORGANIZED HEALTH CARE EDUCATION/TRAINING PROGRAM

## 2023-05-03 PROCEDURE — 3079F PR MOST RECENT DIASTOLIC BLOOD PRESSURE 80-89 MM HG: ICD-10-PCS | Mod: CPTII,,, | Performed by: STUDENT IN AN ORGANIZED HEALTH CARE EDUCATION/TRAINING PROGRAM

## 2023-05-03 PROCEDURE — 3075F PR MOST RECENT SYSTOLIC BLOOD PRESS GE 130-139MM HG: ICD-10-PCS | Mod: CPTII,,, | Performed by: STUDENT IN AN ORGANIZED HEALTH CARE EDUCATION/TRAINING PROGRAM

## 2023-05-03 PROCEDURE — 3008F PR BODY MASS INDEX (BMI) DOCUMENTED: ICD-10-PCS | Mod: CPTII,,, | Performed by: STUDENT IN AN ORGANIZED HEALTH CARE EDUCATION/TRAINING PROGRAM

## 2023-05-03 PROCEDURE — 99215 OFFICE O/P EST HI 40 MIN: CPT | Mod: PBBFAC | Performed by: STUDENT IN AN ORGANIZED HEALTH CARE EDUCATION/TRAINING PROGRAM

## 2023-05-03 PROCEDURE — 99496 TRANSJ CARE MGMT HIGH F2F 7D: CPT | Mod: S$PBB,,, | Performed by: STUDENT IN AN ORGANIZED HEALTH CARE EDUCATION/TRAINING PROGRAM

## 2023-05-03 PROCEDURE — 3075F SYST BP GE 130 - 139MM HG: CPT | Mod: CPTII,,, | Performed by: STUDENT IN AN ORGANIZED HEALTH CARE EDUCATION/TRAINING PROGRAM

## 2023-05-03 PROCEDURE — 3079F DIAST BP 80-89 MM HG: CPT | Mod: CPTII,,, | Performed by: STUDENT IN AN ORGANIZED HEALTH CARE EDUCATION/TRAINING PROGRAM

## 2023-05-03 PROCEDURE — 1159F MED LIST DOCD IN RCRD: CPT | Mod: CPTII,,, | Performed by: STUDENT IN AN ORGANIZED HEALTH CARE EDUCATION/TRAINING PROGRAM

## 2023-05-03 PROCEDURE — 3044F HG A1C LEVEL LT 7.0%: CPT | Mod: CPTII,,, | Performed by: STUDENT IN AN ORGANIZED HEALTH CARE EDUCATION/TRAINING PROGRAM

## 2023-05-03 PROCEDURE — 99496 TRANSITIONAL CARE MANAGE SERVICE 7 DAY DISCHARGE: ICD-10-PCS | Mod: S$PBB,,, | Performed by: STUDENT IN AN ORGANIZED HEALTH CARE EDUCATION/TRAINING PROGRAM

## 2023-05-03 PROCEDURE — 4010F PR ACE/ARB THEARPY RXD/TAKEN: ICD-10-PCS | Mod: CPTII,,, | Performed by: STUDENT IN AN ORGANIZED HEALTH CARE EDUCATION/TRAINING PROGRAM

## 2023-05-03 RX ORDER — PREGABALIN 50 MG/1
50 CAPSULE ORAL 2 TIMES DAILY
Qty: 60 CAPSULE | Refills: 5 | Status: ON HOLD | OUTPATIENT
Start: 2023-05-03 | End: 2023-06-30 | Stop reason: SDUPTHER

## 2023-05-03 RX ORDER — TIOTROPIUM BROMIDE 18 UG/1
1 CAPSULE ORAL; RESPIRATORY (INHALATION)
COMMUNITY
Start: 2023-04-16 | End: 2023-05-10 | Stop reason: SDUPTHER

## 2023-05-03 NOTE — PROGRESS NOTES
"Subjective:       Patient ID: Hafsa Hawley is a 57 y.o. female.    Chief Complaint: Hospital Follow Up (Pt here for hospital follow up. Went in for fluid on heart and SOB.)    Pt here for hospital follow-up    She was admitted 4/27/23-5/2/23 for acute on chronic combined heart failure exacerbation. She was diuresed. She was also started on lexapro 5mg daily for depression. They added hydralazine for HTN and increased her entresto dose.     Her main complaint today is chronic right knee pain and bilateral foot pain. Pain worse with activity. Pain described as burning and "twitching/jumping/sharp/tingling". She is using lidocaine patches without relief. She has taken norco in the past as well.    She is also requesting a bedside commode. She has issues with ambulating long distances due to LAGUERRE and her foot pain.     Transitional Care Note    Family and/or Caretaker present at visit?  No.  Diagnostic tests reviewed/disposition: No diagnosic tests pending after this hospitalization.  Disease/illness education: CHF, chronic pain  Home health/community services discussion/referrals: Patient does not have home health established from hospital visit.  They would benefit but cannot get one that accepts her insurance and would need home health.  If needed, we will set up home health for the patient  Establishment or re-establishment of referral orders for community resources: No other necessary community resources.   Discussion with other health care providers: No discussion with other health care providers necessary.         Review of Systems   Constitutional:  Negative for chills and fever.   HENT:  Negative for congestion and sore throat.    Respiratory:  Negative for shortness of breath.    Cardiovascular:  Positive for leg swelling. Negative for chest pain.   Gastrointestinal:  Positive for nausea (chronic).   Genitourinary:  Negative for dysuria and hematuria.   Musculoskeletal:  Positive for arthralgias.   "   Objective:      Physical Exam   Constitutional: She is oriented to person, place, and time.   HENT:   Head: Normocephalic and atraumatic.   Eyes: Conjunctivae are normal.   Cardiovascular: Normal rate, regular rhythm and normal heart sounds.   No murmur heard.  Pulmonary/Chest: Effort normal and breath sounds normal. No respiratory distress.   Musculoskeletal:         General: No deformity.      Right lower leg: No edema.      Left lower leg: No edema.      Comments: Mild swelling right knee without erythema; mild TTP medial aspect  Bilateral feet with significant TTP dorsal aspect without swelling or erythema   Neurological: She is alert and oriented to person, place, and time.   Psychiatric: Her behavior is normal. Mood normal.   In better spirits today; excited about the potential for tranplant   Vitals reviewed.    Assessment:       1. Chronic pain of right knee    2. Chronic pain in left foot    3. Chronic foot pain, right    4. Neuropathy        Plan:           1. Chronic pain of right knee  -     X-Ray Knee 1 or 2 View Right; Future; Expected date: 05/03/2023  -     Ambulatory referral/consult to Orthopedics; Future; Expected date: 05/10/2023  -     Ambulatory referral/consult to Physical/Occupational Therapy; Future; Expected date: 05/10/2023    2. Chronic pain in left foot  -     X-Ray Foot Complete Bilateral; Future; Expected date: 05/03/2023  -     Ambulatory referral/consult to Orthopedics; Future; Expected date: 05/10/2023  -     Ambulatory referral/consult to Physical/Occupational Therapy; Future; Expected date: 05/10/2023    3. Chronic foot pain, right  -     X-Ray Foot Complete Bilateral; Future; Expected date: 05/03/2023  -     Ambulatory referral/consult to Orthopedics; Future; Expected date: 05/10/2023  -     Ambulatory referral/consult to Physical/Occupational Therapy; Future; Expected date: 05/10/2023    4. Neuropathy  -     pregabalin (LYRICA) 50 MG capsule; Take 1 capsule (50 mg total) by  mouth 2 (two) times daily.  Dispense: 60 capsule; Refill: 5    Cont current meds, but increase lyrica dose to 50mg BID  Check xray of the right knee and bilateral feet.   Refer ortho  Refer PT  Follow-up cardiology as scheduled  Follow-up nephrology as scheduled  Pt would benefit from bedside commode due to chronic pain, resp failure, inability to ambulate long distances, and need for diuretics and frequent urination    RTC for worsening symptoms or failure to improve, or RTC PRN/as scheduled

## 2023-05-03 NOTE — PROGRESS NOTES
C3 nurse attempted to contact Hafsa Hawley for a TCC post hospital discharge follow up call. The patient is unable to conduct the call @ this time. The patient requested a callback.    The patient has a scheduled hospitals appointment with Cristobal Ann on 05/03/2023 @ 2321. Message sent to Physician staff.

## 2023-05-04 NOTE — PROGRESS NOTES
No additional outreaches will be attempted for this encounter only as the patient completed her scheduled [facility] appointment with Cristobal Ann on 05/03/2023 @ 7869.

## 2023-05-08 ENCOUNTER — TELEPHONE (OUTPATIENT)
Dept: ORTHOPEDICS | Facility: CLINIC | Age: 58
End: 2023-05-08
Payer: MEDICAID

## 2023-05-08 DIAGNOSIS — M25.561 RIGHT KNEE PAIN, UNSPECIFIED CHRONICITY: Primary | ICD-10-CM

## 2023-05-08 DIAGNOSIS — M51.36 LUMBAR DEGENERATIVE DISC DISEASE: ICD-10-CM

## 2023-05-08 NOTE — TELEPHONE ENCOUNTER
----- Message from Frank Cook sent at 2023 11:16 AM CDT -----  Contact: Patient  Hafsa Hawley  MRN: [id number]  : 1965  PCP: Primary Doctor No  Home Phone      [phone]  Work Phone      Not on file.  Mobile          [phone]  Home Phone      [phone]      MESSAGE:   Pt requesting refill or new Rx.   Is this a refill or new RX:  refills  RX name and strength: Zofran 4 mg                                        Lidocaine patches                                        Virtamin D  Last office visit: 23  Is this a 30-day or 90-day RX:  30 day  [facility] [place]  Comments:      Phone:  [phone]    PCP: Seth

## 2023-05-09 ENCOUNTER — TELEPHONE (OUTPATIENT)
Dept: FAMILY MEDICINE | Facility: CLINIC | Age: 58
End: 2023-05-09
Payer: MEDICAID

## 2023-05-09 ENCOUNTER — OFFICE VISIT (OUTPATIENT)
Dept: ORTHOPEDICS | Facility: CLINIC | Age: 58
End: 2023-05-09
Payer: MEDICAID

## 2023-05-09 ENCOUNTER — HOSPITAL ENCOUNTER (OUTPATIENT)
Dept: RADIOLOGY | Facility: HOSPITAL | Age: 58
Discharge: HOME | End: 2023-05-09
Attending: PHYSICIAN ASSISTANT
Payer: MEDICAID

## 2023-05-09 ENCOUNTER — HOSPITAL ENCOUNTER (OUTPATIENT)
Dept: RADIOLOGY | Facility: HOSPITAL | Age: 58
Discharge: HOME | End: 2023-05-09
Attending: STUDENT IN AN ORGANIZED HEALTH CARE EDUCATION/TRAINING PROGRAM
Payer: MEDICAID

## 2023-05-09 VITALS
HEART RATE: 86 BPM | WEIGHT: 185.63 LBS | SYSTOLIC BLOOD PRESSURE: 116 MMHG | OXYGEN SATURATION: 94 % | BODY MASS INDEX: 29.83 KG/M2 | HEIGHT: 66 IN | DIASTOLIC BLOOD PRESSURE: 64 MMHG

## 2023-05-09 DIAGNOSIS — M25.461 EFFUSION OF RIGHT KNEE: Primary | ICD-10-CM

## 2023-05-09 DIAGNOSIS — M79.671 CHRONIC FOOT PAIN, RIGHT: ICD-10-CM

## 2023-05-09 DIAGNOSIS — M79.672 CHRONIC PAIN IN LEFT FOOT: ICD-10-CM

## 2023-05-09 DIAGNOSIS — G89.29 CHRONIC FOOT PAIN, RIGHT: ICD-10-CM

## 2023-05-09 DIAGNOSIS — M25.561 CHRONIC PAIN OF RIGHT KNEE: ICD-10-CM

## 2023-05-09 DIAGNOSIS — G89.29 CHRONIC PAIN IN LEFT FOOT: ICD-10-CM

## 2023-05-09 DIAGNOSIS — M25.561 RIGHT KNEE PAIN, UNSPECIFIED CHRONICITY: ICD-10-CM

## 2023-05-09 DIAGNOSIS — G89.29 CHRONIC PAIN OF RIGHT KNEE: ICD-10-CM

## 2023-05-09 LAB
APPEARANCE FLD: NORMAL
BODY FLD TYPE: ABNORMAL
BODY FLD TYPE: NORMAL
COLOR FLD: YELLOW
CRYSTALS FLD MICRO: POSITIVE
LYMPHOCYTES NFR FLD MANUAL: 65 %
MONOS+MACROS NFR FLD MANUAL: 13 %
NEUTROPHILS NFR FLD MANUAL: 22 %
WBC # FLD: 176 /CU MM

## 2023-05-09 PROCEDURE — 73564 X-RAY EXAM KNEE 4 OR MORE: CPT | Mod: 26,RT,, | Performed by: RADIOLOGY

## 2023-05-09 PROCEDURE — 73630 X-RAY EXAM OF FOOT: CPT | Mod: 26,50,, | Performed by: RADIOLOGY

## 2023-05-09 PROCEDURE — 89060 EXAM SYNOVIAL FLUID CRYSTALS: CPT | Performed by: PHYSICIAN ASSISTANT

## 2023-05-09 PROCEDURE — 3078F PR MOST RECENT DIASTOLIC BLOOD PRESSURE < 80 MM HG: ICD-10-PCS | Mod: CPTII,,, | Performed by: PHYSICIAN ASSISTANT

## 2023-05-09 PROCEDURE — 87205 SMEAR GRAM STAIN: CPT | Performed by: PHYSICIAN ASSISTANT

## 2023-05-09 PROCEDURE — 3074F SYST BP LT 130 MM HG: CPT | Mod: CPTII,,, | Performed by: PHYSICIAN ASSISTANT

## 2023-05-09 PROCEDURE — 73562 XR KNEE ORTHO RIGHT WITH FLEXION: ICD-10-PCS | Mod: 26,LT,, | Performed by: RADIOLOGY

## 2023-05-09 PROCEDURE — 89051 BODY FLUID CELL COUNT: CPT | Performed by: PHYSICIAN ASSISTANT

## 2023-05-09 PROCEDURE — 73562 X-RAY EXAM OF KNEE 3: CPT | Mod: 26,LT,, | Performed by: RADIOLOGY

## 2023-05-09 PROCEDURE — 73564 XR KNEE ORTHO RIGHT WITH FLEXION: ICD-10-PCS | Mod: 26,RT,, | Performed by: RADIOLOGY

## 2023-05-09 PROCEDURE — 73630 X-RAY EXAM OF FOOT: CPT | Mod: TC,50

## 2023-05-09 PROCEDURE — 87070 CULTURE OTHR SPECIMN AEROBIC: CPT | Performed by: PHYSICIAN ASSISTANT

## 2023-05-09 PROCEDURE — 4010F ACE/ARB THERAPY RXD/TAKEN: CPT | Mod: CPTII,,, | Performed by: PHYSICIAN ASSISTANT

## 2023-05-09 PROCEDURE — 99215 OFFICE O/P EST HI 40 MIN: CPT | Mod: PBBFAC | Performed by: PHYSICIAN ASSISTANT

## 2023-05-09 PROCEDURE — 1159F PR MEDICATION LIST DOCUMENTED IN MEDICAL RECORD: ICD-10-PCS | Mod: CPTII,,, | Performed by: PHYSICIAN ASSISTANT

## 2023-05-09 PROCEDURE — 1160F RVW MEDS BY RX/DR IN RCRD: CPT | Mod: CPTII,,, | Performed by: PHYSICIAN ASSISTANT

## 2023-05-09 PROCEDURE — 1160F PR REVIEW ALL MEDS BY PRESCRIBER/CLIN PHARMACIST DOCUMENTED: ICD-10-PCS | Mod: CPTII,,, | Performed by: PHYSICIAN ASSISTANT

## 2023-05-09 PROCEDURE — 20610 PR DRAIN/INJECT LARGE JOINT/BURSA: ICD-10-PCS | Mod: S$PBB,RT,, | Performed by: PHYSICIAN ASSISTANT

## 2023-05-09 PROCEDURE — 1159F MED LIST DOCD IN RCRD: CPT | Mod: CPTII,,, | Performed by: PHYSICIAN ASSISTANT

## 2023-05-09 PROCEDURE — 99213 PR OFFICE/OUTPT VISIT, EST, LEVL III, 20-29 MIN: ICD-10-PCS | Mod: S$PBB,25,, | Performed by: PHYSICIAN ASSISTANT

## 2023-05-09 PROCEDURE — 99999 PR PBB SHADOW E&M-EST. PATIENT-LVL V: ICD-10-PCS | Mod: PBBFAC,,, | Performed by: PHYSICIAN ASSISTANT

## 2023-05-09 PROCEDURE — 3008F BODY MASS INDEX DOCD: CPT | Mod: CPTII,,, | Performed by: PHYSICIAN ASSISTANT

## 2023-05-09 PROCEDURE — 20610 DRAIN/INJ JOINT/BURSA W/O US: CPT | Mod: PBBFAC,RT | Performed by: PHYSICIAN ASSISTANT

## 2023-05-09 PROCEDURE — 3044F HG A1C LEVEL LT 7.0%: CPT | Mod: CPTII,,, | Performed by: PHYSICIAN ASSISTANT

## 2023-05-09 PROCEDURE — 99213 OFFICE O/P EST LOW 20 MIN: CPT | Mod: S$PBB,25,, | Performed by: PHYSICIAN ASSISTANT

## 2023-05-09 PROCEDURE — 1111F DSCHRG MED/CURRENT MED MERGE: CPT | Mod: CPTII,,, | Performed by: PHYSICIAN ASSISTANT

## 2023-05-09 PROCEDURE — 73630 XR FOOT COMPLETE 3 VIEW BILATERAL: ICD-10-PCS | Mod: 26,50,, | Performed by: RADIOLOGY

## 2023-05-09 PROCEDURE — 73564 X-RAY EXAM KNEE 4 OR MORE: CPT | Mod: TC,RT

## 2023-05-09 PROCEDURE — 4010F PR ACE/ARB THEARPY RXD/TAKEN: ICD-10-PCS | Mod: CPTII,,, | Performed by: PHYSICIAN ASSISTANT

## 2023-05-09 PROCEDURE — 99999 PR PBB SHADOW E&M-EST. PATIENT-LVL V: CPT | Mod: PBBFAC,,, | Performed by: PHYSICIAN ASSISTANT

## 2023-05-09 PROCEDURE — 3008F PR BODY MASS INDEX (BMI) DOCUMENTED: ICD-10-PCS | Mod: CPTII,,, | Performed by: PHYSICIAN ASSISTANT

## 2023-05-09 PROCEDURE — 3078F DIAST BP <80 MM HG: CPT | Mod: CPTII,,, | Performed by: PHYSICIAN ASSISTANT

## 2023-05-09 PROCEDURE — 20610 DRAIN/INJ JOINT/BURSA W/O US: CPT | Mod: S$PBB,RT,, | Performed by: PHYSICIAN ASSISTANT

## 2023-05-09 PROCEDURE — 1111F PR DISCHARGE MEDS RECONCILED W/ CURRENT OUTPATIENT MED LIST: ICD-10-PCS | Mod: CPTII,,, | Performed by: PHYSICIAN ASSISTANT

## 2023-05-09 PROCEDURE — 3044F PR MOST RECENT HEMOGLOBIN A1C LEVEL <7.0%: ICD-10-PCS | Mod: CPTII,,, | Performed by: PHYSICIAN ASSISTANT

## 2023-05-09 PROCEDURE — 3074F PR MOST RECENT SYSTOLIC BLOOD PRESSURE < 130 MM HG: ICD-10-PCS | Mod: CPTII,,, | Performed by: PHYSICIAN ASSISTANT

## 2023-05-09 RX ORDER — ERGOCALCIFEROL 1.25 MG/1
50000 CAPSULE ORAL
Qty: 12 CAPSULE | Refills: 1 | Status: SHIPPED | OUTPATIENT
Start: 2023-05-13 | End: 2023-11-03

## 2023-05-09 RX ORDER — LIDOCAINE 50 MG/G
1 PATCH TOPICAL DAILY PRN
Start: 2023-05-09 | End: 2023-05-10 | Stop reason: SDUPTHER

## 2023-05-09 RX ORDER — ONDANSETRON 4 MG/1
4 TABLET, ORALLY DISINTEGRATING ORAL 2 TIMES DAILY
Qty: 20 TABLET | Refills: 0 | Status: SHIPPED | OUTPATIENT
Start: 2023-05-09 | End: 2023-05-24

## 2023-05-09 NOTE — PROGRESS NOTES
"Subjective:      Patient ID: Hafsa Hawley is a 57 y.o. female.    Chief Complaint: Pain and Swelling of the Right Knee, Pain and Tingling of the Right Foot, Pain and Tingling of the Left Foot, Weakness of the Left Lower Leg, and Weakness of the Right Lower Leg    Review of patient's allergies indicates:   Allergen Reactions    Penicillins Hives and Other (See Comments)    Iodinated contrast media Nausea And Vomiting    Oxycodone-acetaminophen Other (See Comments)     Nausea, Dizziness, Anxiety.  "I don't like how it makes me feel."   Given Hydromorphone 0.5mg IVP  Without problems.  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 Metabolism of other drugs not listed here may also be impacted by UGT1A1 enzyme activity.       Tramadol Nausea And Vomiting and Other (See Comments)     Other reaction(s): Other (See Comments)    Valsartan Other (See Comments)      56 yo F presents to clinic with c/o right knee pain and swelling x few months.  No injury or trauma.  No redness or warmth.  Diffuse knee pain, described as achy.  Worse with bending knee and walking and improves with rest.  Pt reports history of gout, she wonders if gout is causing her knee to swell.      Review of Systems   Constitutional: Negative for chills, diaphoresis and fever.   HENT:  Negative for congestion, ear discharge and ear pain.    Eyes:  Negative for blurred vision, discharge, double vision and " pain.   Cardiovascular:  Negative for chest pain, claudication and cyanosis.   Respiratory:  Negative for cough, hemoptysis and shortness of breath.    Endocrine: Negative for cold intolerance and heat intolerance.   Skin:  Negative for color change, dry skin, itching and rash.   Musculoskeletal:  Positive for joint pain and joint swelling. Negative for arthritis, back pain, falls, gout, muscle weakness and neck pain.   Gastrointestinal:  Negative for abdominal pain and change in bowel habit.   Neurological:  Negative for brief paralysis, disturbances in coordination and dizziness.   Psychiatric/Behavioral:  Negative for altered mental status and depression.        Objective:          General    Constitutional: She is oriented to person, place, and time. She appears well-developed and well-nourished. No distress.   HENT:   Head: Atraumatic.   Eyes: EOM are normal. Right eye exhibits no discharge. Left eye exhibits no discharge.   Cardiovascular:  Normal rate.            Pulmonary/Chest: Effort normal. No respiratory distress.   Abdominal: Soft.   Neurological: She is alert and oriented to person, place, and time.   Psychiatric: She has a normal mood and affect. Her behavior is normal.           Right Knee Exam     Inspection   Erythema: absent  Scars: absent  Swelling: present  Effusion: present  Deformity: absent  Bruising: absent    Tenderness   The patient is tender to palpation of the medial joint line and lateral joint line.    Range of Motion   Extension:  normal   Flexion:  abnormal     Tests   Meniscus   Greg:  Medial - negative Lateral - negative  Ligament Examination   Lachman: normal (-1 to 2mm)   PCL-Posterior Drawer: normal (0 to 2mm)     MCL - Valgus: normal (0 to 2mm)  LCL - Varus: normal    Other   Sensation: normal    Left Knee Exam     Inspection   Erythema: absent  Scars: absent  Swelling: absent  Effusion: absent  Deformity: absent  Bruising: absent    Range of Motion   Extension:  normal    Flexion:  normal     Tests   Meniscus   Greg:  Medial - negative Lateral - negative  Stability   Lachman: normal (-1 to 2mm)   PCL-Posterior Drawer: normal (0 to 2mm)  MCL - Valgus: normal (0 to 2mm)  LCL - Varus: normal (0 to 2mm)    Other   Sensation: normal    Vascular Exam       Edema  Right Lower Leg: absent  Left Lower Leg: absent            Assessment:         Xray Right Knee 5/9/23:  Right: No fracture or dislocation.  There is a moderate to large suprapatellar joint effusion.  Cartilage spaces are maintained.     Left: No fracture or dislocation on provided views.  Cartilage spaces are maintained.      Encounter Diagnoses   Name Primary?    Chronic pain of right knee     Chronic pain in left foot     Chronic foot pain, right     Effusion of right knee Yes    Effusion of right knee  -     CULTURE, FLUID  (AEROBIC)  -     WBC & Diff,Body Fluid Joint Fluid, Right Knee  -     Cancel: Culture, Body Fluid - Bactec  -     Body fluid crystal Joint Fluid, Right Knee    Chronic pain of right knee  -     Ambulatory referral/consult to Orthopedics    Chronic pain in left foot  -     Ambulatory referral/consult to Orthopedics  -     Ambulatory referral/consult to Podiatry; Future; Expected date: 05/16/2023    Chronic foot pain, right  -     Ambulatory referral/consult to Orthopedics  -     Ambulatory referral/consult to Podiatry; Future; Expected date: 05/16/2023               Plan:         I made the decision to obtain old records of the patient including previous notes and imaging. New imaging was ordered today of the extremity or extremities evaluated.     The total face-to-face encounter time with this patient was 30 minutes and greater than 50% of of the encounter time was spent counseling the patient, coordinating care, and education regarding the pathology and natural history of his diagnosis. We have discussed a variety of treatment options including medications, injections, physical therapy and other  alternative treatments. Pt is requesting knee aspiration at this time.  She is also asking for referral to podiatry for bilateral foot pain.    Aspiration:  A time out was performed, including verification of patient ID, procedure, site and side, availability of information and equipment, review of safety issues, and agreement with consent, the procedure site was marked.  After time out was performed, the patient was prepped aseptically with chloraprep swabsticks. 58 cc's of normal joint fluid was aspirated from the Superolateral  aspect of the right Knee Joint using a 18g x 1.5 needle in sterile fashion without complication.   Ace wrap was applied to knee following aspiration.  Fluid sent to lab for analysis.    2. Knee conditioning home exercises.  3. Continue voltaren gel topically as prescribed by PCP as needed.  4. Ice compress to the affected area 2-3x a day for 15-20 minutes as needed for pain management.  5. Referral to podiatry for further evaluation of foot pain.  6. RTC in 2 weeks for follow up, sooner if needed.      Patient voices understanding of and agreement with treatment plan. All of the patient's questions were answered and the patient will contact us if she has any questions or concerns in the interim.

## 2023-05-09 NOTE — PROGRESS NOTES
Please inform the pt xray of the feet shows some arthritis changes and a small spur on the left heel. No other acute findings.

## 2023-05-09 NOTE — TELEPHONE ENCOUNTER
----- Message from Frank Cook sent at 2023 11:16 AM CDT -----  Contact: Patient  Hafsa Hawley  MRN: [id number]  : 1965  PCP: Primary Doctor No  Home Phone      [phone]  Work Phone      Not on file.  Mobile          [phone]  Home Phone      [phone]      MESSAGE:   Pt requesting refill or new Rx.   Is this a refill or new RX:  refills  RX name and strength: Zofran 4 mg                                        Lidocaine patches                                        Virtamin D  Last office visit: 23  Is this a 30-day or 90-day RX:  30 day  [facility] [place]  Comments:      Phone:  [phone]    PCP: Seth

## 2023-05-10 ENCOUNTER — TELEPHONE (OUTPATIENT)
Dept: FAMILY MEDICINE | Facility: CLINIC | Age: 58
End: 2023-05-10
Payer: MEDICAID

## 2023-05-10 ENCOUNTER — OFFICE VISIT (OUTPATIENT)
Dept: TRANSPLANT | Facility: CLINIC | Age: 58
DRG: 291 | End: 2023-05-10
Attending: INTERNAL MEDICINE
Payer: MEDICAID

## 2023-05-10 VITALS
HEART RATE: 83 BPM | DIASTOLIC BLOOD PRESSURE: 85 MMHG | BODY MASS INDEX: 30.22 KG/M2 | WEIGHT: 188.06 LBS | HEIGHT: 66 IN | SYSTOLIC BLOOD PRESSURE: 135 MMHG

## 2023-05-10 DIAGNOSIS — M51.36 LUMBAR DEGENERATIVE DISC DISEASE: ICD-10-CM

## 2023-05-10 DIAGNOSIS — M1A.0610 CHRONIC GOUT OF RIGHT KNEE, UNSPECIFIED CAUSE: Primary | ICD-10-CM

## 2023-05-10 DIAGNOSIS — I13.0 HYPERTENSIVE CARDIOVASCULAR-RENAL DISEASE, STAGE 1-4 OR UNSPECIFIED CHRONIC KIDNEY DISEASE, WITH HEART FAILURE: ICD-10-CM

## 2023-05-10 DIAGNOSIS — R06.02 SHORTNESS OF BREATH: ICD-10-CM

## 2023-05-10 DIAGNOSIS — J98.4 RESTRICTIVE LUNG DISEASE: ICD-10-CM

## 2023-05-10 DIAGNOSIS — Z95.810 ICD (IMPLANTABLE CARDIOVERTER-DEFIBRILLATOR) IN PLACE: Chronic | ICD-10-CM

## 2023-05-10 DIAGNOSIS — M77.12 LEFT LATERAL EPICONDYLITIS: ICD-10-CM

## 2023-05-10 DIAGNOSIS — E11.22 TYPE 2 DIABETES MELLITUS WITH STAGE 4 CHRONIC KIDNEY DISEASE, WITH LONG-TERM CURRENT USE OF INSULIN: ICD-10-CM

## 2023-05-10 DIAGNOSIS — I10 ESSENTIAL HYPERTENSION: Chronic | ICD-10-CM

## 2023-05-10 DIAGNOSIS — I50.42 CHRONIC COMBINED SYSTOLIC AND DIASTOLIC HEART FAILURE: Primary | Chronic | ICD-10-CM

## 2023-05-10 DIAGNOSIS — G47.33 OSA (OBSTRUCTIVE SLEEP APNEA): Chronic | ICD-10-CM

## 2023-05-10 DIAGNOSIS — Z79.4 TYPE 2 DIABETES MELLITUS WITH STAGE 4 CHRONIC KIDNEY DISEASE, WITH LONG-TERM CURRENT USE OF INSULIN: ICD-10-CM

## 2023-05-10 DIAGNOSIS — M67.40 GANGLION CYST: ICD-10-CM

## 2023-05-10 DIAGNOSIS — M25.532 LEFT WRIST PAIN: ICD-10-CM

## 2023-05-10 DIAGNOSIS — N18.4 TYPE 2 DIABETES MELLITUS WITH STAGE 4 CHRONIC KIDNEY DISEASE, WITH LONG-TERM CURRENT USE OF INSULIN: ICD-10-CM

## 2023-05-10 DIAGNOSIS — N18.4 CKD (CHRONIC KIDNEY DISEASE), STAGE IV: Chronic | ICD-10-CM

## 2023-05-10 DIAGNOSIS — I42.8 NICM (NONISCHEMIC CARDIOMYOPATHY): Chronic | ICD-10-CM

## 2023-05-10 LAB — PATH INTERP FLD-IMP: NORMAL

## 2023-05-10 PROCEDURE — 4010F ACE/ARB THERAPY RXD/TAKEN: CPT | Mod: CPTII,,, | Performed by: INTERNAL MEDICINE

## 2023-05-10 PROCEDURE — 3008F PR BODY MASS INDEX (BMI) DOCUMENTED: ICD-10-PCS | Mod: CPTII,,, | Performed by: INTERNAL MEDICINE

## 2023-05-10 PROCEDURE — 3008F BODY MASS INDEX DOCD: CPT | Mod: CPTII,,, | Performed by: INTERNAL MEDICINE

## 2023-05-10 PROCEDURE — 1160F RVW MEDS BY RX/DR IN RCRD: CPT | Mod: CPTII,,, | Performed by: INTERNAL MEDICINE

## 2023-05-10 PROCEDURE — 99215 OFFICE O/P EST HI 40 MIN: CPT | Mod: S$PBB,,, | Performed by: INTERNAL MEDICINE

## 2023-05-10 PROCEDURE — 1159F PR MEDICATION LIST DOCUMENTED IN MEDICAL RECORD: ICD-10-PCS | Mod: CPTII,,, | Performed by: INTERNAL MEDICINE

## 2023-05-10 PROCEDURE — 3079F PR MOST RECENT DIASTOLIC BLOOD PRESSURE 80-89 MM HG: ICD-10-PCS | Mod: CPTII,,, | Performed by: INTERNAL MEDICINE

## 2023-05-10 PROCEDURE — 99215 OFFICE O/P EST HI 40 MIN: CPT | Mod: PBBFAC | Performed by: INTERNAL MEDICINE

## 2023-05-10 PROCEDURE — 99999 PR PBB SHADOW E&M-EST. PATIENT-LVL V: ICD-10-PCS | Mod: PBBFAC,,, | Performed by: INTERNAL MEDICINE

## 2023-05-10 PROCEDURE — 3075F SYST BP GE 130 - 139MM HG: CPT | Mod: CPTII,,, | Performed by: INTERNAL MEDICINE

## 2023-05-10 PROCEDURE — 99999 PR PBB SHADOW E&M-EST. PATIENT-LVL V: CPT | Mod: PBBFAC,,, | Performed by: INTERNAL MEDICINE

## 2023-05-10 PROCEDURE — 1111F DSCHRG MED/CURRENT MED MERGE: CPT | Mod: CPTII,,, | Performed by: INTERNAL MEDICINE

## 2023-05-10 PROCEDURE — 1160F PR REVIEW ALL MEDS BY PRESCRIBER/CLIN PHARMACIST DOCUMENTED: ICD-10-PCS | Mod: CPTII,,, | Performed by: INTERNAL MEDICINE

## 2023-05-10 PROCEDURE — 3044F PR MOST RECENT HEMOGLOBIN A1C LEVEL <7.0%: ICD-10-PCS | Mod: CPTII,,, | Performed by: INTERNAL MEDICINE

## 2023-05-10 PROCEDURE — 1111F PR DISCHARGE MEDS RECONCILED W/ CURRENT OUTPATIENT MED LIST: ICD-10-PCS | Mod: CPTII,,, | Performed by: INTERNAL MEDICINE

## 2023-05-10 PROCEDURE — 3079F DIAST BP 80-89 MM HG: CPT | Mod: CPTII,,, | Performed by: INTERNAL MEDICINE

## 2023-05-10 PROCEDURE — 99215 PR OFFICE/OUTPT VISIT, EST, LEVL V, 40-54 MIN: ICD-10-PCS | Mod: S$PBB,,, | Performed by: INTERNAL MEDICINE

## 2023-05-10 PROCEDURE — 4010F PR ACE/ARB THEARPY RXD/TAKEN: ICD-10-PCS | Mod: CPTII,,, | Performed by: INTERNAL MEDICINE

## 2023-05-10 PROCEDURE — 3044F HG A1C LEVEL LT 7.0%: CPT | Mod: CPTII,,, | Performed by: INTERNAL MEDICINE

## 2023-05-10 PROCEDURE — 3075F PR MOST RECENT SYSTOLIC BLOOD PRESS GE 130-139MM HG: ICD-10-PCS | Mod: CPTII,,, | Performed by: INTERNAL MEDICINE

## 2023-05-10 PROCEDURE — 1159F MED LIST DOCD IN RCRD: CPT | Mod: CPTII,,, | Performed by: INTERNAL MEDICINE

## 2023-05-10 RX ORDER — TIOTROPIUM BROMIDE 18 UG/1
18 CAPSULE ORAL; RESPIRATORY (INHALATION) DAILY
Qty: 30 CAPSULE | Refills: 5 | Status: SHIPPED | OUTPATIENT
Start: 2023-05-10 | End: 2023-10-10 | Stop reason: SDUPTHER

## 2023-05-10 RX ORDER — DICLOFENAC SODIUM 10 MG/G
GEL TOPICAL
Qty: 400 G | Refills: 0 | Status: SHIPPED | OUTPATIENT
Start: 2023-05-10 | End: 2023-05-31

## 2023-05-10 RX ORDER — LIDOCAINE 50 MG/G
1 PATCH TOPICAL DAILY PRN
Qty: 30 PATCH | Refills: 5 | Status: SHIPPED | OUTPATIENT
Start: 2023-05-10 | End: 2023-05-23 | Stop reason: SDUPTHER

## 2023-05-10 NOTE — TELEPHONE ENCOUNTER
She is already on allopurinol and we cannot raise her dose due to renal function. We can see her for follow-up next month as scheduled.

## 2023-05-10 NOTE — TELEPHONE ENCOUNTER
----- Message from Miriam Csatro sent at 2023  2:53 PM CDT -----  Contact: self  Hafsa Hawley  MRN: [id number]  : 1965  PCP: Primary Doctor No  Home Phone      [phone]  Work Phone      Not on file.  Mobile          [phone]  Home Phone      [phone]      MESSAGE:   Patient states she has not been able to get her RX of HYDROcodone-acetaminophen (NORCO) 5-325 mg per tablet for the last two months due to pharmacy being out of stock. Requesting we send to [facility]. It also looks like RX for lidocaine patches may have failed-- does not look like a pharmacy is linked to RX sent in encounter. She is also needing refill on rescue inhaler.    Please send patches and inhaler to [facility]      [phone]

## 2023-05-10 NOTE — TELEPHONE ENCOUNTER
Refill Routing Note   Refill Routing Note   Medication(s) are not appropriate for processing by [facility] for the following reason(s):      Medication outside of protocol    [facility] action(s):  Route None identified        Medication reconciliation completed: No     Appointments  past 12m or future 3m with PCP    Date Provider   Last Visit   5/3/2023 Cristobal Ann MD   Next Visit   6/6/2023 Cristobal Ann MD   ED visits in past 90 days: 5        Note composed:5:32 PM 05/10/2023

## 2023-05-10 NOTE — PATIENT INSTRUCTIONS
With the rapid worsening of your kidney function over past few days, I recommend that you come into the hospital today.  I also recommend starting palliative (comfort measure, there is not a survival benefit) dobutamine.  This is given continuously through an IV in your arm or a tunnel catheter in the chest.  We would start with the arm.  Goal is to help you feel better, improve heart failure and lessen chance on needing to be readmitted to the hospital.  It does not improve survival.    Unfortunately, this is all that I have to offer you for treatment.  You are a candidate for hospice as we discussed.    Other transplant centers I recommend should you want another opinion:  [facility], [place] [facility]    The reason you should come into the hospital today is the rapid deterioration of your kidney function.  Your poor kidney function is back to where you were when admitted 2 weeks ago.

## 2023-05-10 NOTE — TELEPHONE ENCOUNTER
----- Message from Quyen Bryan LPN sent at 5/10/2023  8:46 AM CDT -----  We saw Mrs. Pereyra in the office yesterday for her knee and feet. Her fluid culture was positive for Gout. Can you get her scheduled for follow up/treatment with DR. Ann please?

## 2023-05-10 NOTE — PROGRESS NOTES
Subjective:   Transplant status: declined    HPI:  Ms. Hawley is a 57 y.o. year old Black or  female who has presented to be evaluated as a potential heart transplant recipient.      58 yo BF with CHF since 2013 due to NICM, pulmonary hypertension, DM, HTN, permanent AF and ICD.  She was previously evaluated by our team and presented to committee on 3/9/22.  At the time she was declined for OHT or LVAD due to renal function, lung function and difficulty consistently following up with the team.   She returns for f/u visit as undergoing evaluation for heart-kidney transplant.  She does not have her medications with her.  She is not have a medication list of her home medicines with her.  She does not know any of her medications by name except Lasix.  Was described as Lasix 80 mg twice daily but she reports she takes the morning dose but only use the evening dose if she feels the need to do so.  I asked her how she knows without a scale she said she just bases this on her abdominal bloating and any edema.  She reports that her scale is broken and she has not gotten a new 1 so she has no daily weights to report.  She is accompanied by her granddaughter.  She tells me if she was approved for transplant her primary caregiver will be Jeanie Jaimes (sister and Laura Jaimes her niece.  She is going to stay in Laura's house after her transplant, if approved.    She saw Dr. Guardado in January 2023 at which time sent to the ED and admitted to [facility] medicine 1/3/23.  She was treated for ADHF with IV lasix 80 mg IV BID and HTS consulted.  At that time it was noted that she had run out of metoprolol for 1 week PTA.  She was readmitted to [facility] few weeks later with ADHF and at discharge told to see HTS.  She came Feb 3, 2023 to see Dr. Guardado but was sent to ED with nausea and vomiting.  There treated with IV lasix and sent home from ED.  She comes today for f/u visit.  Of note per Dr. Guardado she was seen by Pulm in Jan  2023 and they felt that there was no overt evidence of ILD or COPD on PFTs and CT chest.  They felt that the etiology of her chronic hypoxic and hypercapnic respiratory failure was not clear.     I saw her February 16, 2023 at which time she reported that her shortness of breath was stable and described NYHA class 3 symptoms.  At this visit I was very concerned that her lung disease is her limiting factor based upon the desaturation with exercise not just today but also on the day of her CPX.  On the CPX in December her breathing reserve was borderline reduced.  An elevated VCO2 slope can occur with lung disease as well as heart failure.  I suspect she has a combination of lung disease and heart failure that is limiting her ability to be physically active and resulting in her high level of symptomology.  I note on her problem list a diagnosis of chronic respiratory hypoxic and hypercapnic respiratory failure.  I did not see a blood gas to confirm this diagnosis so requested that one be obtained.    She saw Dr. Chacko February 27, 2023 note not yet signed but at end of current note he mentions his concern regarding PFT's.  On 2/27/2023 PFT's read as reduced FEV1/SVC ratio (66%) and low FEF 25-75 suggest mild obstruction on spirometry. Lung volumes show moderate restriction is present. Overall spirometry shows severe ventilatory impairment. DLCO is moderately decreased. MVV is severely decreased. On 2/27/2023 ABG 7.46/pCO2 was 39 and pO2 was 73 and these ABG's do not reflect the previously described chronic hypoxic and hypercapnic respiratory failure.    She sees Dr. Ángel Elizabeth as Pulm at [facility].  His notes describe on PFTs December 22, 2021 severe restriction with moderately reduced DLCO.  He stated on his note of December 8, 2022 that she was on home oxygen felt she was benefitting from it.  However he does concludes that there is no evidence of ILD or COPD on PFTs/CT chest.   On August 1, 2022 he documented in an  "addendum that was signed March 24, 2022 that the patient had chronic respiratory failure with hypoxia and hypercapnia:  Patient is on home oxygen and is benefitting from it.   I found a note from February 16, 2022 under assessment he describes chronic respiratory failure with hypoxia and hypercapnia.  He reports that she was trying to wean herself off oxygen."    She returns today still not clear on her medications.  Still no weight log.  So no accurate list of medications other than what we have in the system here.  She is not very interactive at her visit today her  answers most of the questions.    Her PFTs today 03/15/2023 demonstrates severe restriction and severely reduced MVV there is also the possibility of mild obstruction per report.  In February 27,2023 she has a blood gas reportedly on room air that is normal without CO2 retention (7.46/39/73).    She was presented to selection committee again on 3/29/23 and was declined for Heart Transplant listing due to poor medical adherence, declined pulmonary function, and declined renal function.     Her renal function has deteriorated significantly over the week since her discharge.  She does not have Entresto in her bag.  She told me she had her Entresto  mg tablets at home and that she was taking twice daily.  I commented that I am last hospitalization they wanted her to drop Entresto to 49-51 mg twice daily.  She does not believe she has these tablets at home but then says that she might and that she is taking whatever they told her when she left the hospital.  She does not have a list of her medications.  She does not have her weights.    Her symptoms of heart failure include shortness of breath and sleeping on 2 pillows.  Though she has deteriorated regarding renal function she feels that her shortness of breath is a little bit better today than when she left the hospital last week.    Past Medical History:   Diagnosis Date    Anemia     " Arthritis     Atrial fibrillation     OAC    Chronic respiratory failure with hypoxia, on home oxygen therapy     2L with activity, off at rest.  Per Pulm  no overt evidence of ILD or COPD on PFTs and CT to explain O2 needs.    CKD (chronic kidney disease), stage IV 05/08/2018    Congestive heart failure     s/p AICD placement,    Deep vein thrombosis     Depression     elevated bilirubin d/t Gilbert's syndrome     confirmed by [place] genetic testing, evaluated by hepatology    Encounter for blood transfusion     GERD (gastroesophageal reflux disease)     Hypertension     Pheochromocytoma, malignant     Right kidney mass     Sleep apnea     Thalassemia trait, alpha     Thyroid disease     Type 2 diabetes mellitus with hyperglycemia, without long-term current use of insulin 08/13/2020     Past Surgical History:   Procedure Laterality Date    APPENDECTOMY      BONE MARROW BIOPSY      CARDIAC DEFIBRILLATOR PLACEMENT Left 12/2016    CARDIAC ELECTROPHYSIOLOGY MAPPING AND ABLATION      CARDIAC ELECTROPHYSIOLOGY MAPPING AND ABLATION      COLONOSCOPY N/A 5/6/2022    Procedure: COLONOSCOPY;  Surgeon: Arely Betancourt MD;  Location: [facility] ([address] [place] [address]);  Service: Endoscopy;  Laterality: N/A;  heart transplant candidate/ EF 25% - 2nd floor/ defib - Biotronik - ERW  Eliquis - per Dr. Cortez with [facility], Pt ok to hold Eliquis x 2 days prior-see media tab-outside correspondence dated 12/30/21  - ERW  verbal instructions/portal instructions/email instructions - s    EYE SURGERY      due to running tears    FRACTURE SURGERY Left     hand 5th digit    HYSTERECTOMY      KNEE SURGERY Left 2016    hematoma    LIVER BIOPSY  10/24/2018    Minimal steatosis, predominantly macrovesicular, 1%, Minimal nonspecific portal inflammation, no fibrosis. No findings on biopsy to explain elevated bilirubin levels. Could be d/t Gilbert's =?- hemolysis    RIGHT HEART CATHETERIZATION Right 12/07/2021    Procedure: INSERTION, CATHETER, RIGHT HEART;   Surgeon: Irving Cardenas MD;  Location: [facility] CATH LAB;  Service: Cardiology;  Laterality: Right;    RIGHT HEART CATHETERIZATION Right 12/19/2022    Procedure: INSERTION, CATHETER, RIGHT HEART;  Surgeon: Burke Camilo MD;  Location: [facility] CATH LAB;  Service: Cardiology;  Laterality: Right;    TRANSJUGULAR BIOPSY OF LIVER N/A 10/24/2018    Procedure: BIOPSY, LIVER, TRANSJUGULAR APPROACH;  [facility];  Location: [facility] OR [address] [place] [address];  Service: Radiology;  Laterality: N/A;     Current Outpatient Medications on File Prior to Visit   Medication Sig Dispense Refill    albuterol-ipratropium (DUO-NEB) 2.5 mg-0.5 mg/3 mL nebulizer solution Take 3 mLs by nebulization 2 (two) times a day. 90 mL 3    allopurinoL (ZYLOPRIM) 100 MG tablet Take 1 tablet (100 mg total) by mouth daily as needed (gout attack).      ALPRAZolam (XANAX) 2 MG Tab Take 2 mg by mouth 2 (two) times daily.      amiodarone (PACERONE) 200 MG Tab Take 200 mg by mouth.      atorvastatin (LIPITOR) 40 MG tablet Take 1 tablet (40 mg total) by mouth every evening. 90 tablet 3    b complex vitamins tablet Take 1 tablet by mouth once daily.      blood sugar diagnostic (ACCU-CHEK GUIDE TEST STRIPS) Strp 1 strip by Other route 3 (three) times daily. 100 each 11    bumetanide (BUMEX) 2 MG tablet Take 2 tablets (4 mg total) by mouth 2 (two) times a day. 120 tablet 11    cetirizine (ZYRTEC) 10 MG tablet Take 10 mg by mouth daily as needed for Allergies.      diclofenac sodium (VOLTAREN) 1 % Gel APPLY 2 G TOPICALLY 3 (THREE) TIMES DAILY AS NEEDED (PAIN). 400 g 0    ELIQUIS 5 mg Tab TAKE 1 TABLET (5 MG TOTAL) BY MOUTH 2 (TWO) TIMES DAILY. 60 tablet 2    empagliflozin (JARDIANCE) 10 mg tablet Take 1 tablet (10 mg total) by mouth once daily. 30 tablet 5    [START ON 5/13/2023] ergocalciferol (ERGOCALCIFEROL) 50,000 unit Cap Take 1 capsule (50,000 Units total) by mouth every Saturday. 12 capsule 1    EScitalopram oxalate (LEXAPRO) 5 MG Tab Take 1 tablet  (5 mg total) by mouth once daily. 30 tablet 2    fluticasone propionate (FLONASE) 50 mcg/actuation nasal spray 2 sprays by Each Nostril route daily as needed for Rhinitis.       hydrALAZINE (APRESOLINE) 25 MG tablet Take 1 tablet (25 mg total) by mouth every 8 (eight) hours. 90 tablet 11    isosorbide dinitrate (ISORDIL) 30 MG Tab Take 1 tablet (30 mg total) by mouth 3 (three) times daily. 90 tablet 11    lancets (ACCU-CHEK SOFTCLIX LANCETS) Misc 1 Device by Misc.(Non-Drug; Combo Route) route 3 (three) times daily. 100 each 11    LIDOcaine (LIDODERM) 5 % Place 1 patch onto the skin daily as needed (pain). Remove & Discard patch within 12 hours or as directed by MD      metoprolol succinate (TOPROL-XL) 100 MG 24 hr tablet Take 1 tablet (100 mg total) by mouth once daily. 30 tablet 11    ondansetron (ZOFRAN-ODT) 4 MG TbDL Take 1 tablet (4 mg total) by mouth 2 (two) times daily. 20 tablet 0    pantoprazole (PROTONIX) 40 MG tablet TAKE 1 TABLET BY MOUTH DAILY IN THE MORNING 30 tablet 11    potassium chloride (MICRO-K) 10 MEQ CpSR Take 1 capsule (10 mEq total) by mouth once daily. 30 capsule 11    pregabalin (LYRICA) 50 MG capsule Take 1 capsule (50 mg total) by mouth 2 (two) times daily. 60 capsule 5    promethazine-codeine 6.25-10 mg/5 ml (PHENERGAN WITH CODEINE) 6.25-10 mg/5 mL syrup Take 5 mLs by mouth every 4 to 6 hours as needed. 177 mL 0    sacubitriL-valsartan (ENTRESTO)  mg per tablet  it is not clear if she started the Entresto 49-51 mg dose  Take 1 tablet by mouth 2 (two) times daily. 60 tablet 11    semaglutide 0.25 mg or 0.5 mg (2 mg/3 mL) PnIj Inject 0.5 mg into the skin every 7 days. 1 each 11    SPIRIVA WITH HANDIHALER 18 mcg inhalation capsule 1 capsule.      traZODone (DESYREL) 50 MG tablet Take 25 mg by mouth nightly as needed.      VENTOLIN HFA 90 mcg/actuation inhaler Inhale 2 puffs into the lungs every 6 (six) hours as needed for Shortness of Breath or Wheezing.  11    mirtazapine (REMERON)  "7.5 MG Tab Take 1 tablet (7.5 mg total) by mouth every evening. 30 tablet 1    mometasone-formoterol (DULERA) 200-5 mcg/actuation inhaler Inhale 2 puffs into the lungs 2 (two) times daily. Controller 13 g 11     Current Facility-Administered Medications on File Prior to Visit   Medication Dose Route Frequency Provider Last Rate Last Admin    0.9%  NaCl infusion   Intravenous Continuous Corinna Hayes NP   New Bag at 05/23/19 0745    vancomycin in dextrose 5 % 1 gram/250 mL IVPB 1,000 mg  1,000 mg Intravenous On Call Procedure Corinna Hayes NP   1,000 mg at 05/23/19 0736       Review of Systems   Constitutional: Negative for chills and fever.   HENT:  Negative for hearing loss.    Eyes:  Negative for visual disturbance.   Cardiovascular:  Positive for dyspnea on exertion. Negative for chest pain, irregular heartbeat, leg swelling, orthopnea, palpitations, paroxysmal nocturnal dyspnea and syncope.   Respiratory:  Positive for cough and shortness of breath.    Musculoskeletal:  Negative for muscle weakness.   Gastrointestinal:  Positive for nausea and vomiting. Negative for diarrhea.   Neurological:  Negative for focal weakness.   Psychiatric/Behavioral:  Negative for memory loss.      Objective:   Blood pressure 135/85, pulse 83, height 5' 6" (1.676 m), weight 85.3 kg (188 lb 0.8 oz), last menstrual period 10/23/2001.body mass index is 30.35 kg/m².  Physical Exam  Constitutional:       Appearance: She is obese.      Comments: /89 (BP Location: Left arm, Patient Position: Sitting, BP Method: Medium (Automatic))   Pulse 91   Ht 5' 6" (1.676 m)   Wt 88.6 kg (195 lb 5.2 oz)   LMP 10/23/2001   BMI 31.53 kg/m²   Last visit wt 203#     HENT:      Head: Atraumatic.   Eyes:      General: No scleral icterus.        Right eye: No discharge.         Left eye: No discharge.   Neck:      Vascular: JVD present. No carotid bruit.      Comments: Venous pulsations are noted 4 fingers above the clavicle while " sitting  Cardiovascular:      Rate and Rhythm: Normal rate and regular rhythm.      Pulses: Normal pulses.      Heart sounds: Normal heart sounds.      Comments: JVP 12 cm.  Pulmonary:      Breath sounds: Normal breath sounds.   Abdominal:      General: Bowel sounds are normal.      Palpations: Abdomen is soft. There is no mass.      Tenderness: There is no abdominal tenderness.      Comments: I can not determine her liver span   Musculoskeletal:         General: No swelling. Normal range of motion.      Right lower leg: No edema.      Left lower leg: No edema.   Neurological:      General: No focal deficit present.      Mental Status: She is alert. Mental status is at baseline.   Psychiatric:      Comments: She is somewhat withdrawn.  Does not interact well.  Does not know her medications.  Relies on her  to answer questions though she will answer when I persist the answers are just a couple of words     Lab Results   Component Value Date    BNP 4,274 (H) 02/20/2023    BNP 2,470 (H) 01/30/2023     (H) 01/06/2023     Lab Results   Component Value Date    BNP 4,274 (H) 02/20/2023     03/14/2023    K 4.5 03/14/2023    MG 1.9 02/21/2023     03/14/2023    CO2 25 03/14/2023    PHOS 3.7 02/21/2023    BUN 59 (H) 03/14/2023    CREATININE 2.7 (H) 03/14/2023     (H) 03/14/2023    HGBA1C 4.6 01/30/2023    AST 22 03/14/2023    ALT 22 03/14/2023    ALBUMIN 3.9 03/14/2023    PROT 6.8 03/14/2023    BILITOT 2.3 (H) 03/14/2023    WBC 4.90 03/14/2023    HGB 9.6 (L) 03/14/2023    HCT 33.2 (L) 03/14/2023    HCT 45 12/07/2021     03/14/2023    INR 1.0 01/04/2023     (H) 01/04/2023    TSH 1.407 01/04/2023    O3KXIYS 8.4 09/11/2017    FREET4 1.07 05/09/2018    CHOL 136 01/04/2023    HDL 63 01/04/2023    LDLCALC 57.8 (L) 01/04/2023    TRIG 76 01/04/2023 2/27/2023 PFT's read as reduced FEV1/SVC ratio (66%) and low FEF 25-75 suggest mild obstruction on spirometry. Lung volumes show  moderate restriction is present. Overall spirometry shows severe ventilatory impairment. DLCO is moderately decreased. MVV is severely decreased.     02/16/2023 6 minute walk test on room air she walked 244 m.  Resting saturation 92% after exercise 88% recovery 93%.  She stopped for chest pain, shortness of breath and dizziness.  Her Haseeb scale was 3 at rest and 7-8 post exercise.    02/16/2023 6 minute walk test on oxygen 2 liters/minute she walked 30.5 m oxygen saturation at rest 96% post exercise 97% recovery 94%.  She stopped for shortness of breath.    CPX 12/19/22  Resting spirometry reveals an FVC = 1.47L which is 44.69% of predicted, an FEV1 of 1.03L, which is 39.09% of predicted and an FEV1/FVC ratio of 70.07%. The MVV = 41.2 L/min, which is 42.35% of predicted.     The respiratory exchange ratio (RER) was 0.87, suggesting poor effort.     The breathing reserve is calculated at 21.36%, which is borderline reduced. Oxygen saturation with exercise  became reduced reaching a ruth ann of 88% from a baseline value of 90%.     The peak VO2 was 10.1 ml/kg/min which is 33.44% of predicted equating to a functional capacity of 2.89 METS indicating severe functional impairment.     The anaerobic threshold was not attained.     The peak VO2 Lean was 12.58 ml/kg of lean body weight/min indicating a poor prognosis in heart failure.     The VE/VCO2 Norman was 48.1. The Resting PetCO2 was 27.0.     Severe functional impairment associated with a borderline reduced breathing reserve, reduced oxygen saturation with exercise, poor effort. These findings are indicative of functional impairment secondary to poor effort.    RHC 12/19/22    RA 13 PA 78/40 (52)  PCWP 26    CO 4.7 l/min  CI 2.3 l/min/m2    PVR 5.5 BOLDEN.    SVR 1333 dynes/cm5.s    12/9/2022 Device check  Presenting egram demonstrates: AS/VS   Autocapture Algorithms: off   Device fxn WNL   RA pacing  %, V pacing  0%   Atrial arrhythmias: x1 SVT episode, max duration 43  seconds   Ventricular arrhythmias: none   Battery Longevity/Status: MOS1/53%   Resume remote transmission in 3 months.   Return to device clinic in April 2023 12/22/2021 PFT  Spirometry shows a reduced vital capacity suggesting restriction. Lung volumes show severe restriction is present. DLCO is moderately decreased.    Assessment:      1. Chronic combined systolic and diastolic heart failure    2. NICM (nonischemic cardiomyopathy)    3. Hypertensive cardiovascular-renal disease, stage 1-4 or unspecified chronic kidney disease, with heart failure    4. Essential hypertension    5. CKD (chronic kidney disease), stage IV    6. Restrictive lung disease    7. ICD (implantable cardioverter-defibrillator) in place    8. MELISSA (obstructive sleep apnea)    9. Type 2 diabetes mellitus with stage 4 chronic kidney disease, with long-term current use of insulin      All diagnosis listed above were specifically reviewed by me with treatments continued as prior or changed as reflected in the plan of this note.      Plan:   Her renal function has deteriorated significantly over the week since her discharge.  She does not have Entresto, metoprolol or hydralazine in her bag of medications.  She told me she had her Entresto  mg tablets at home and that she was taking twice daily.  I commented that I am last hospitalization they wanted her to drop Entresto to 49-51 mg twice daily.  She does not believe she has these tablets at home but then says that she might and that she is taking whatever they told her when she left the hospital.  She does not have a list of her medications either what she is supposed to be taking or what she is actually taking.  She does not have her weights.    She questioned why she was not a candidate for transplant or LVAD so we reviewed the reasons once more.  I explained that she is no longer under consideration for advanced options.  She has been in the hospital in March 2023 with acute  decompensated heart failure and again admitted at the end of April and discharged May 2, 2023.  Her creatinine nas over the past week from 1.4 at discharge to 2.6 today.  I do not know if today's creatinine elevation is due to her taking the wrong dose of Entresto (Entresto  instead of Entresto 49-51) or if she does not have Entresto at all but she assures me that is not the case but she does not recall she received new strength of [facility] hospital prior to discharge to take home or if that has been filled as she told me at 1 point in the visit that she was waiting for her Entresto.  For the bottles that she does have it is important to note that Isordil was filled March 7, 2023 with instructions to take 3 times daily and she has 1/4 of a bottle left and atorvastatin was filled October 11, 2022 to take 1 tablet nightly and she has a quarter bottle left.    She asked about 2nd opinion centers so I gave her names of centers in [place] and [place].    She asked her prognosis and I explained that she had a 50% mortality rate over the next 6 months and 80% over the next 3 years.    I discussed hospice with her today.  We discussed coming in the hospital for palliative care dobutamine which I strongly recommended in view of this renal insufficiency a current so fast.  She is not certain what she wants to do but will not come in the hospital today.    I told her that since she is not a candidate for advanced options her local cardiologist Dr. Benson Cortez can follow her locally.  She is due to see him this week and I will pass this note along for his perusal.  She does not need to return to [facility] for heart failure care less Dr. Cortez can not offer her home dobutamine and she is willing to initiate this palliative therapy.  At this point she is not willing to do so plans to go home and Google information on dobutamine.    Listed for transplant: No and no longer under consideration for advanced options    I  spent 60 minutes face-to-face with the patient

## 2023-05-10 NOTE — TELEPHONE ENCOUNTER
PT also requesting refill on NORCO to be sent to [facility] as well as her pharmacy has been out of stock for months.

## 2023-05-10 NOTE — LETTER
May 10, 2023        Benson Cortez  [address] [facility] [place] [address]  Phone: [phone]  Fax: [phone]     Krysta Tony  [address] [facility] [address]  Phone: [phone]  Fax: [phone]     Andrea Pacheco  [address] [facility]  [address]  [place] [address]  Phone: [phone]  Fax: [phone]             [id number]  [address]  [place] [address]  Phone: [phone]   Patient: Hafsa Hawley   MR Number: [id number]   YOB: 1965   Date of Visit: 5/10/2023       Dear Dr. Andrea Pacheco, Benson Cortez, Krysta Tony    Thank you for referring Hafsa Hawley to me for evaluation. Attached you will find relevant portions of my assessment and plan of care.    If you have questions, please do not hesitate to call me. I look forward to following Hafsa Hawley along with you.    Sincerely,    Jake Orozco Jr, MD    Enclosure    If you would like to receive this communication electronically, please contact [email or web address] or [phone] to request [employer] Link access.    [employer] Link is a tool which provides read-only access to select patient information with whom you have a relationship. Its easy to use and provides real time access to review your patients record including encounter summaries, notes, results, and demographic information.    If you feel you have received this communication in error or would no longer like to receive these types of communications, please e-mail [email or web address]

## 2023-05-11 ENCOUNTER — HOSPITAL ENCOUNTER (INPATIENT)
Facility: HOSPITAL | Age: 58
LOS: 4 days | Discharge: HOME | DRG: 291 | End: 2023-05-15
Attending: INTERNAL MEDICINE | Admitting: INTERNAL MEDICINE
Payer: MEDICAID

## 2023-05-11 ENCOUNTER — TELEPHONE (OUTPATIENT)
Dept: TRANSPLANT | Facility: CLINIC | Age: 58
End: 2023-05-11
Payer: MEDICAID

## 2023-05-11 DIAGNOSIS — I50.41 ACUTE COMBINED SYSTOLIC AND DIASTOLIC CONGESTIVE HEART FAILURE: ICD-10-CM

## 2023-05-11 DIAGNOSIS — R06.00 DYSPNEA, UNSPECIFIED TYPE: ICD-10-CM

## 2023-05-11 DIAGNOSIS — I50.20 ACC/AHA STAGE D SYSTOLIC HEART FAILURE: Primary | ICD-10-CM

## 2023-05-11 DIAGNOSIS — I50.43 ACUTE ON CHRONIC COMBINED SYSTOLIC AND DIASTOLIC HEART FAILURE: ICD-10-CM

## 2023-05-11 DIAGNOSIS — Z51.5 PALLIATIVE CARE ENCOUNTER: ICD-10-CM

## 2023-05-11 DIAGNOSIS — I50.9 ACUTE ON CHRONIC HEART FAILURE: ICD-10-CM

## 2023-05-11 DIAGNOSIS — Z71.89 ADVANCE CARE PLANNING: ICD-10-CM

## 2023-05-11 DIAGNOSIS — R53.81 DEBILITY: ICD-10-CM

## 2023-05-11 PROBLEM — N18.4 CKD (CHRONIC KIDNEY DISEASE), STAGE IV: Chronic | Status: RESOLVED | Noted: 2018-05-08 | Resolved: 2023-05-11

## 2023-05-11 PROBLEM — N18.4 CKD (CHRONIC KIDNEY DISEASE), STAGE IV: Status: ACTIVE | Noted: 2022-08-23

## 2023-05-11 LAB
ALBUMIN SERPL BCP-MCNC: 3.7 G/DL (ref 3.5–5.2)
ALP SERPL-CCNC: 59 U/L (ref 55–135)
ALT SERPL W/O P-5'-P-CCNC: 20 U/L (ref 10–44)
ANION GAP SERPL CALC-SCNC: 11 MMOL/L (ref 8–16)
ANISOCYTOSIS BLD QL SMEAR: ABNORMAL
AST SERPL-CCNC: 20 U/L (ref 10–40)
BASOPHILS # BLD AUTO: 0.03 K/UL (ref 0–0.2)
BASOPHILS NFR BLD: 0.8 % (ref 0–1.9)
BILIRUB SERPL-MCNC: 1.7 MG/DL (ref 0.1–1)
BNP SERPL-MCNC: 3087 PG/ML (ref 0–99)
BUN SERPL-MCNC: 60 MG/DL (ref 6–20)
CALCIUM SERPL-MCNC: 9.2 MG/DL (ref 8.7–10.5)
CHLORIDE SERPL-SCNC: 104 MMOL/L (ref 95–110)
CO2 SERPL-SCNC: 26 MMOL/L (ref 23–29)
CREAT SERPL-MCNC: 2.2 MG/DL (ref 0.5–1.4)
DACRYOCYTES BLD QL SMEAR: ABNORMAL
DIFFERENTIAL METHOD: ABNORMAL
EOSINOPHIL # BLD AUTO: 0.1 K/UL (ref 0–0.5)
EOSINOPHIL NFR BLD: 1.9 % (ref 0–8)
ERYTHROCYTE [DISTWIDTH] IN BLOOD BY AUTOMATED COUNT: 29.4 % (ref 11.5–14.5)
EST. GFR  (NO RACE VARIABLE): 25.5 ML/MIN/1.73 M^2
ESTIMATED AVG GLUCOSE: 123 MG/DL (ref 68–131)
GLUCOSE SERPL-MCNC: 150 MG/DL (ref 70–110)
HBA1C MFR BLD: 5.9 % (ref 4–5.6)
HCT VFR BLD AUTO: 37.2 % (ref 37–48.5)
HGB BLD-MCNC: 10.7 G/DL (ref 12–16)
HYPOCHROMIA BLD QL SMEAR: ABNORMAL
IMM GRANULOCYTES # BLD AUTO: 0.01 K/UL (ref 0–0.04)
IMM GRANULOCYTES NFR BLD AUTO: 0.3 % (ref 0–0.5)
LYMPHOCYTES # BLD AUTO: 1 K/UL (ref 1–4.8)
LYMPHOCYTES NFR BLD: 27.3 % (ref 18–48)
MAGNESIUM SERPL-MCNC: 2 MG/DL (ref 1.6–2.6)
MCH RBC QN AUTO: 17.6 PG (ref 27–31)
MCHC RBC AUTO-ENTMCNC: 28.8 G/DL (ref 32–36)
MCV RBC AUTO: 61 FL (ref 82–98)
MONOCYTES # BLD AUTO: 0.5 K/UL (ref 0.3–1)
MONOCYTES NFR BLD: 12.8 % (ref 4–15)
NEUTROPHILS # BLD AUTO: 2 K/UL (ref 1.8–7.7)
NEUTROPHILS NFR BLD: 56.9 % (ref 38–73)
NRBC BLD-RTO: 0 /100 WBC
PLATELET # BLD AUTO: 186 K/UL (ref 150–450)
PLATELET BLD QL SMEAR: ABNORMAL
PMV BLD AUTO: ABNORMAL FL (ref 9.2–12.9)
POCT GLUCOSE: 112 MG/DL (ref 70–110)
POIKILOCYTOSIS BLD QL SMEAR: ABNORMAL
POLYCHROMASIA BLD QL SMEAR: ABNORMAL
POTASSIUM SERPL-SCNC: 3.8 MMOL/L (ref 3.5–5.1)
PROT SERPL-MCNC: 6.6 G/DL (ref 6–8.4)
RBC # BLD AUTO: 6.08 M/UL (ref 4–5.4)
SCHISTOCYTES BLD QL SMEAR: ABNORMAL
SCHISTOCYTES BLD QL SMEAR: PRESENT
SODIUM SERPL-SCNC: 141 MMOL/L (ref 136–145)
SPHEROCYTES BLD QL SMEAR: ABNORMAL
TARGETS BLD QL SMEAR: ABNORMAL
WBC # BLD AUTO: 3.59 K/UL (ref 3.9–12.7)

## 2023-05-11 PROCEDURE — 25000003 PHARM REV CODE 250: Performed by: STUDENT IN AN ORGANIZED HEALTH CARE EDUCATION/TRAINING PROGRAM

## 2023-05-11 PROCEDURE — 83735 ASSAY OF MAGNESIUM: CPT | Performed by: STUDENT IN AN ORGANIZED HEALTH CARE EDUCATION/TRAINING PROGRAM

## 2023-05-11 PROCEDURE — 93010 EKG 12-LEAD: ICD-10-PCS | Mod: ,,, | Performed by: INTERNAL MEDICINE

## 2023-05-11 PROCEDURE — 85025 COMPLETE CBC W/AUTO DIFF WBC: CPT | Performed by: STUDENT IN AN ORGANIZED HEALTH CARE EDUCATION/TRAINING PROGRAM

## 2023-05-11 PROCEDURE — 36415 COLL VENOUS BLD VENIPUNCTURE: CPT | Performed by: STUDENT IN AN ORGANIZED HEALTH CARE EDUCATION/TRAINING PROGRAM

## 2023-05-11 PROCEDURE — 93005 ELECTROCARDIOGRAM TRACING: CPT

## 2023-05-11 PROCEDURE — 99223 PR INITIAL HOSPITAL CARE,LEVL III: ICD-10-PCS | Mod: ,,, | Performed by: INTERNAL MEDICINE

## 2023-05-11 PROCEDURE — 20600001 HC STEP DOWN PRIVATE ROOM

## 2023-05-11 PROCEDURE — 83880 ASSAY OF NATRIURETIC PEPTIDE: CPT | Performed by: STUDENT IN AN ORGANIZED HEALTH CARE EDUCATION/TRAINING PROGRAM

## 2023-05-11 PROCEDURE — 83036 HEMOGLOBIN GLYCOSYLATED A1C: CPT | Performed by: STUDENT IN AN ORGANIZED HEALTH CARE EDUCATION/TRAINING PROGRAM

## 2023-05-11 PROCEDURE — 63600175 PHARM REV CODE 636 W HCPCS: Performed by: STUDENT IN AN ORGANIZED HEALTH CARE EDUCATION/TRAINING PROGRAM

## 2023-05-11 PROCEDURE — 99223 1ST HOSP IP/OBS HIGH 75: CPT | Mod: ,,, | Performed by: INTERNAL MEDICINE

## 2023-05-11 PROCEDURE — 80053 COMPREHEN METABOLIC PANEL: CPT | Performed by: STUDENT IN AN ORGANIZED HEALTH CARE EDUCATION/TRAINING PROGRAM

## 2023-05-11 PROCEDURE — 93010 ELECTROCARDIOGRAM REPORT: CPT | Mod: ,,, | Performed by: INTERNAL MEDICINE

## 2023-05-11 RX ORDER — DEXTROSE 40 %
30 GEL (GRAM) ORAL
Status: DISCONTINUED | OUTPATIENT
Start: 2023-05-11 | End: 2023-05-15 | Stop reason: HOSPADM

## 2023-05-11 RX ORDER — PANTOPRAZOLE SODIUM 40 MG/1
40 TABLET, DELAYED RELEASE ORAL DAILY
Status: DISCONTINUED | OUTPATIENT
Start: 2023-05-12 | End: 2023-05-15 | Stop reason: HOSPADM

## 2023-05-11 RX ORDER — GLUCAGON 1 MG
1 KIT INJECTION
Status: DISCONTINUED | OUTPATIENT
Start: 2023-05-11 | End: 2023-05-15 | Stop reason: HOSPADM

## 2023-05-11 RX ORDER — ATORVASTATIN CALCIUM 40 MG/1
40 TABLET, FILM COATED ORAL NIGHTLY
Status: DISCONTINUED | OUTPATIENT
Start: 2023-05-11 | End: 2023-05-15 | Stop reason: HOSPADM

## 2023-05-11 RX ORDER — IBUPROFEN 200 MG
16 TABLET ORAL
Status: DISCONTINUED | OUTPATIENT
Start: 2023-05-11 | End: 2023-05-11

## 2023-05-11 RX ORDER — PREGABALIN 50 MG/1
50 CAPSULE ORAL 2 TIMES DAILY
Status: DISCONTINUED | OUTPATIENT
Start: 2023-05-11 | End: 2023-05-12

## 2023-05-11 RX ORDER — INSULIN ASPART 100 [IU]/ML
0-5 INJECTION, SOLUTION INTRAVENOUS; SUBCUTANEOUS
Status: DISCONTINUED | OUTPATIENT
Start: 2023-05-11 | End: 2023-05-15 | Stop reason: HOSPADM

## 2023-05-11 RX ORDER — SODIUM CHLORIDE 0.9 % (FLUSH) 0.9 %
10 SYRINGE (ML) INJECTION
Status: DISCONTINUED | OUTPATIENT
Start: 2023-05-11 | End: 2023-05-15 | Stop reason: HOSPADM

## 2023-05-11 RX ORDER — DEXTROSE 40 %
15 GEL (GRAM) ORAL
Status: DISCONTINUED | OUTPATIENT
Start: 2023-05-11 | End: 2023-05-15 | Stop reason: HOSPADM

## 2023-05-11 RX ORDER — FUROSEMIDE 10 MG/ML
80 INJECTION INTRAMUSCULAR; INTRAVENOUS
Status: DISCONTINUED | OUTPATIENT
Start: 2023-05-11 | End: 2023-05-12

## 2023-05-11 RX ORDER — IBUPROFEN 200 MG
24 TABLET ORAL
Status: DISCONTINUED | OUTPATIENT
Start: 2023-05-11 | End: 2023-05-11

## 2023-05-11 RX ADMIN — FUROSEMIDE 80 MG: 10 INJECTION, SOLUTION INTRAMUSCULAR; INTRAVENOUS at 11:05

## 2023-05-11 RX ADMIN — APIXABAN 5 MG: 5 TABLET, FILM COATED ORAL at 09:05

## 2023-05-11 RX ADMIN — PREGABALIN 50 MG: 50 CAPSULE ORAL at 09:05

## 2023-05-11 RX ADMIN — ATORVASTATIN CALCIUM 40 MG: 40 TABLET, FILM COATED ORAL at 09:05

## 2023-05-11 NOTE — PLAN OF CARE
Problem: Adult Inpatient Plan of Care  Goal: Plan of Care Review  Outcome: Ongoing, Not Progressing  Goal: Patient-Specific Goal (Individualized)  Outcome: Ongoing, Not Progressing  Goal: Absence of Hospital-Acquired Illness or Injury  Outcome: Ongoing, Not Progressing  Goal: Optimal Comfort and Wellbeing  Outcome: Ongoing, Not Progressing  Goal: Readiness for Transition of Care  Outcome: Ongoing, Not Progressing     Problem: Diabetes Comorbidity  Goal: Blood Glucose Level Within Targeted Range  Outcome: Ongoing, Not Progressing     Problem: Pain Acute  Goal: Acceptable Pain Control and Functional Ability  Outcome: Ongoing, Not Progressing

## 2023-05-12 LAB
ALBUMIN SERPL BCP-MCNC: 3.3 G/DL (ref 3.5–5.2)
ALP SERPL-CCNC: 51 U/L (ref 55–135)
ALT SERPL W/O P-5'-P-CCNC: 17 U/L (ref 10–44)
ANION GAP SERPL CALC-SCNC: 10 MMOL/L (ref 8–16)
ANION GAP SERPL CALC-SCNC: 13 MMOL/L (ref 8–16)
ANISOCYTOSIS BLD QL SMEAR: SLIGHT
AST SERPL-CCNC: 21 U/L (ref 10–40)
BASOPHILS # BLD AUTO: 0.03 K/UL (ref 0–0.2)
BASOPHILS NFR BLD: 0.9 % (ref 0–1.9)
BILIRUB SERPL-MCNC: 1.5 MG/DL (ref 0.1–1)
BUN SERPL-MCNC: 53 MG/DL (ref 6–20)
BUN SERPL-MCNC: 56 MG/DL (ref 6–20)
CALCIUM SERPL-MCNC: 9 MG/DL (ref 8.7–10.5)
CALCIUM SERPL-MCNC: 9.2 MG/DL (ref 8.7–10.5)
CHLORIDE SERPL-SCNC: 103 MMOL/L (ref 95–110)
CHLORIDE SERPL-SCNC: 105 MMOL/L (ref 95–110)
CO2 SERPL-SCNC: 24 MMOL/L (ref 23–29)
CO2 SERPL-SCNC: 25 MMOL/L (ref 23–29)
CREAT SERPL-MCNC: 1.8 MG/DL (ref 0.5–1.4)
CREAT SERPL-MCNC: 2 MG/DL (ref 0.5–1.4)
DACRYOCYTES BLD QL SMEAR: ABNORMAL
DIFFERENTIAL METHOD: ABNORMAL
EOSINOPHIL # BLD AUTO: 0.1 K/UL (ref 0–0.5)
EOSINOPHIL NFR BLD: 2.7 % (ref 0–8)
ERYTHROCYTE [DISTWIDTH] IN BLOOD BY AUTOMATED COUNT: 29.7 % (ref 11.5–14.5)
EST. GFR  (NO RACE VARIABLE): 28.6 ML/MIN/1.73 M^2
EST. GFR  (NO RACE VARIABLE): 32.5 ML/MIN/1.73 M^2
GIANT PLATELETS BLD QL SMEAR: PRESENT
GLUCOSE SERPL-MCNC: 160 MG/DL (ref 70–110)
GLUCOSE SERPL-MCNC: 92 MG/DL (ref 70–110)
HCT VFR BLD AUTO: 36.2 % (ref 37–48.5)
HGB BLD-MCNC: 9.9 G/DL (ref 12–16)
HOWELL-JOLLY BOD BLD QL SMEAR: ABNORMAL
HYPOCHROMIA BLD QL SMEAR: ABNORMAL
IMM GRANULOCYTES # BLD AUTO: 0.01 K/UL (ref 0–0.04)
IMM GRANULOCYTES NFR BLD AUTO: 0.3 % (ref 0–0.5)
LYMPHOCYTES # BLD AUTO: 1.1 K/UL (ref 1–4.8)
LYMPHOCYTES NFR BLD: 33.1 % (ref 18–48)
MAGNESIUM SERPL-MCNC: 1.8 MG/DL (ref 1.6–2.6)
MAGNESIUM SERPL-MCNC: 2.1 MG/DL (ref 1.6–2.6)
MCH RBC QN AUTO: 17.1 PG (ref 27–31)
MCHC RBC AUTO-ENTMCNC: 27.3 G/DL (ref 32–36)
MCV RBC AUTO: 63 FL (ref 82–98)
MONOCYTES # BLD AUTO: 0.5 K/UL (ref 0.3–1)
MONOCYTES NFR BLD: 14.2 % (ref 4–15)
NEUTROPHILS # BLD AUTO: 1.6 K/UL (ref 1.8–7.7)
NEUTROPHILS NFR BLD: 48.8 % (ref 38–73)
NRBC BLD-RTO: 0 /100 WBC
OVALOCYTES BLD QL SMEAR: ABNORMAL
PLATELET # BLD AUTO: 180 K/UL (ref 150–450)
PLATELET BLD QL SMEAR: ABNORMAL
PMV BLD AUTO: ABNORMAL FL (ref 9.2–12.9)
POCT GLUCOSE: 130 MG/DL (ref 70–110)
POIKILOCYTOSIS BLD QL SMEAR: ABNORMAL
POLYCHROMASIA BLD QL SMEAR: ABNORMAL
POTASSIUM SERPL-SCNC: 3.7 MMOL/L (ref 3.5–5.1)
POTASSIUM SERPL-SCNC: 4 MMOL/L (ref 3.5–5.1)
PROT SERPL-MCNC: 5.9 G/DL (ref 6–8.4)
RBC # BLD AUTO: 5.78 M/UL (ref 4–5.4)
SCHISTOCYTES BLD QL SMEAR: ABNORMAL
SCHISTOCYTES BLD QL SMEAR: PRESENT
SODIUM SERPL-SCNC: 140 MMOL/L (ref 136–145)
SODIUM SERPL-SCNC: 140 MMOL/L (ref 136–145)
SPHEROCYTES BLD QL SMEAR: ABNORMAL
TARGETS BLD QL SMEAR: ABNORMAL
WBC # BLD AUTO: 3.32 K/UL (ref 3.9–12.7)

## 2023-05-12 PROCEDURE — 99233 SBSQ HOSP IP/OBS HIGH 50: CPT | Mod: ,,, | Performed by: INTERNAL MEDICINE

## 2023-05-12 PROCEDURE — 83735 ASSAY OF MAGNESIUM: CPT | Mod: 91 | Performed by: STUDENT IN AN ORGANIZED HEALTH CARE EDUCATION/TRAINING PROGRAM

## 2023-05-12 PROCEDURE — 99900031 HC PATIENT EDUCATION (STAT)

## 2023-05-12 PROCEDURE — 99233 PR SUBSEQUENT HOSPITAL CARE,LEVL III: ICD-10-PCS | Mod: ,,, | Performed by: INTERNAL MEDICINE

## 2023-05-12 PROCEDURE — A4216 STERILE WATER/SALINE, 10 ML: HCPCS | Performed by: INTERNAL MEDICINE

## 2023-05-12 PROCEDURE — 27000221 HC OXYGEN, UP TO 24 HOURS

## 2023-05-12 PROCEDURE — 63600175 PHARM REV CODE 636 W HCPCS: Performed by: STUDENT IN AN ORGANIZED HEALTH CARE EDUCATION/TRAINING PROGRAM

## 2023-05-12 PROCEDURE — 36415 COLL VENOUS BLD VENIPUNCTURE: CPT | Performed by: STUDENT IN AN ORGANIZED HEALTH CARE EDUCATION/TRAINING PROGRAM

## 2023-05-12 PROCEDURE — 25000003 PHARM REV CODE 250: Performed by: INTERNAL MEDICINE

## 2023-05-12 PROCEDURE — 25000003 PHARM REV CODE 250: Performed by: STUDENT IN AN ORGANIZED HEALTH CARE EDUCATION/TRAINING PROGRAM

## 2023-05-12 PROCEDURE — 94761 N-INVAS EAR/PLS OXIMETRY MLT: CPT

## 2023-05-12 PROCEDURE — 99900035 HC TECH TIME PER 15 MIN (STAT)

## 2023-05-12 PROCEDURE — 20600001 HC STEP DOWN PRIVATE ROOM

## 2023-05-12 PROCEDURE — 80053 COMPREHEN METABOLIC PANEL: CPT | Performed by: STUDENT IN AN ORGANIZED HEALTH CARE EDUCATION/TRAINING PROGRAM

## 2023-05-12 PROCEDURE — 80048 BASIC METABOLIC PNL TOTAL CA: CPT | Mod: XB | Performed by: STUDENT IN AN ORGANIZED HEALTH CARE EDUCATION/TRAINING PROGRAM

## 2023-05-12 PROCEDURE — 85025 COMPLETE CBC W/AUTO DIFF WBC: CPT | Performed by: STUDENT IN AN ORGANIZED HEALTH CARE EDUCATION/TRAINING PROGRAM

## 2023-05-12 RX ORDER — AMIODARONE HYDROCHLORIDE 200 MG/1
200 TABLET ORAL DAILY
Status: DISCONTINUED | OUTPATIENT
Start: 2023-05-12 | End: 2023-05-15 | Stop reason: HOSPADM

## 2023-05-12 RX ORDER — HYDRALAZINE HYDROCHLORIDE 25 MG/1
25 TABLET, FILM COATED ORAL EVERY 8 HOURS
Status: DISCONTINUED | OUTPATIENT
Start: 2023-05-12 | End: 2023-05-14

## 2023-05-12 RX ORDER — BUMETANIDE 1 MG/1
4 TABLET ORAL 2 TIMES DAILY
Status: DISCONTINUED | OUTPATIENT
Start: 2023-05-12 | End: 2023-05-13

## 2023-05-12 RX ORDER — DOBUTAMINE HYDROCHLORIDE 400 MG/100ML
2.5 INJECTION INTRAVENOUS CONTINUOUS
Status: DISCONTINUED | OUTPATIENT
Start: 2023-05-12 | End: 2023-05-15 | Stop reason: HOSPADM

## 2023-05-12 RX ORDER — ESCITALOPRAM OXALATE 5 MG/1
5 TABLET ORAL DAILY
Status: DISCONTINUED | OUTPATIENT
Start: 2023-05-12 | End: 2023-05-15 | Stop reason: HOSPADM

## 2023-05-12 RX ORDER — SODIUM CHLORIDE 0.9 % (FLUSH) 0.9 %
10 SYRINGE (ML) INJECTION EVERY 6 HOURS
Status: DISCONTINUED | OUTPATIENT
Start: 2023-05-12 | End: 2023-05-15 | Stop reason: HOSPADM

## 2023-05-12 RX ORDER — SODIUM CHLORIDE 0.9 % (FLUSH) 0.9 %
10 SYRINGE (ML) INJECTION
Status: DISCONTINUED | OUTPATIENT
Start: 2023-05-12 | End: 2023-05-15 | Stop reason: HOSPADM

## 2023-05-12 RX ADMIN — BUMETANIDE 4 MG: 1 TABLET ORAL at 04:05

## 2023-05-12 RX ADMIN — AMIODARONE HYDROCHLORIDE 200 MG: 200 TABLET ORAL at 01:05

## 2023-05-12 RX ADMIN — ATORVASTATIN CALCIUM 40 MG: 40 TABLET, FILM COATED ORAL at 08:05

## 2023-05-12 RX ADMIN — Medication 10 ML: at 06:05

## 2023-05-12 RX ADMIN — ISOSORBIDE DINITRATE 30 MG: 20 TABLET ORAL at 04:05

## 2023-05-12 RX ADMIN — HYDRALAZINE HYDROCHLORIDE 25 MG: 25 TABLET, FILM COATED ORAL at 01:05

## 2023-05-12 RX ADMIN — PANTOPRAZOLE SODIUM 40 MG: 40 TABLET, DELAYED RELEASE ORAL at 09:05

## 2023-05-12 RX ADMIN — HYDRALAZINE HYDROCHLORIDE 25 MG: 25 TABLET, FILM COATED ORAL at 09:05

## 2023-05-12 RX ADMIN — APIXABAN 5 MG: 5 TABLET, FILM COATED ORAL at 08:05

## 2023-05-12 RX ADMIN — FUROSEMIDE 80 MG: 10 INJECTION, SOLUTION INTRAMUSCULAR; INTRAVENOUS at 12:05

## 2023-05-12 RX ADMIN — DOBUTAMINE HYDROCHLORIDE 2.5 MCG/KG/MIN: 400 INJECTION INTRAVENOUS at 04:05

## 2023-05-12 RX ADMIN — APIXABAN 5 MG: 5 TABLET, FILM COATED ORAL at 09:05

## 2023-05-12 RX ADMIN — SACUBITRIL AND VALSARTAN 1 TABLET: 49; 51 TABLET, FILM COATED ORAL at 08:05

## 2023-05-12 RX ADMIN — ISOSORBIDE DINITRATE 30 MG: 20 TABLET ORAL at 08:05

## 2023-05-12 RX ADMIN — ESCITALOPRAM OXALATE 5 MG: 5 TABLET, FILM COATED ORAL at 01:05

## 2023-05-12 NOTE — CONSULTS
Double lumen PICC to right brachial vein.  38 cm in length, 32 cm arm circumference and 0 cm exposed.   Lot # [id number].

## 2023-05-12 NOTE — ASSESSMENT & PLAN NOTE
- NICM   -Last 2D Echo (3/27/2023): LVEF 15%, The left ventricle is severely enlarged.Normal right ventricular size with moderately reduced right ventricular systolic function.Grade II left ventricular diastolic dysfunction. LVIDd: 7.07 cm  -Hypervolemic on examination today  - Current diuretic regimen: Start Lasix 80 mg, TID  - Home diuretic: Bumex 4 mg, BID  - Home GDMT: Jardiance, Hydralazine 25, Isordil 30 TID, Entresto to 97mg, and Toprol to 100 mg  - Will plan to resume Home GDMT as tolerated and re-assess renal function in the AM. At this time will hold off starting   - Last RHC 3/29/23 RA 9, PCWP 16, PA 58/29, mean PA 39, PA sat 45%, AO sat 90% on RA, PVR 5.6 BOLDEN, CO/CI 4.1/2.03, SVR 1151, BP 88/58 , MAP 68, HR 75 SR Hb 10.7 Jayson 3  - Patient is not currently being evaluated for OHTx/VAD; Will discuss with patient and consult palliative care in the AM  - Monitor electrolytes closely. Maintain Mg >2 and K >4  - HF Pathway: Sodium restriction <2 g, Fluid restriction < 1500 mL, Strict I/Os, Daily weights

## 2023-05-12 NOTE — NURSING
11 beats  of v-tach reported per telemetry.  Patient was ambulating to restroom at the time.MD made aware of situation. SEAN

## 2023-05-12 NOTE — HPI
57 year old female with NICM (since 2013), Pulmonary hypertension, DM, HTN, permanent AF and ICD, who presented today after she was referred from clinic to be admitted for palliative dobutamine. Patient was previously evaluated and presented to committee on 3/9/22.  At the time she was declined for OHT or LVAD due to renal function, lung function and difficulty consistently following up with the team as well as poor medical adherence. Patient was seen yesterday in clinic by Dr. Orozco. Concerns regarding medication non-compliance and/or misunderstanding was raised during the visit. Patients renal function has deteriorated since her last discharge. She reports being un Entresto  mg, BID; however, it was noted that her Entresto was reduced during that hospital course.     Patient denies any fever, chills, nausea, vomiting, hematemesis, cough, chest pain, palpitations, shortness of breath, abdominal pain/distension, changes in bowel or urinary habits, no hematochezia or melena.        Her PFTs from 03/15/2023 demonstrated severe restriction and severely reduced MVV there is also the possibility of mild obstruction per report.  In February 27,2023 she has a blood gas reportedly on room air that is normal without CO2 retention (7.46/39/73).       Her symptoms of heart failure include shortness of breath and sleeping on 2 pillows.  Though she has deteriorated regarding renal function she feels that her shortness of breath is a little bit better today than when she left the hospital last week.

## 2023-05-12 NOTE — ASSESSMENT & PLAN NOTE
- Cr. 2.6 yesterday down to 2.2 today  - Baseline renal function appears to be around 1.9-2.2  - Worsening renal function may have been in the setting of high dose Entresto  - Monitor renal function  - Avoid Nephrotoxic agents  - Monitor urine output

## 2023-05-12 NOTE — SUBJECTIVE & OBJECTIVE
Past Medical History:   Diagnosis Date    Anemia     Arthritis     Atrial fibrillation     OAC    Chronic respiratory failure with hypoxia, on home oxygen therapy     2L with activity, off at rest.  Per Pulm  no overt evidence of ILD or COPD on PFTs and CT to explain O2 needs.    CKD (chronic kidney disease), stage IV 05/08/2018    Congestive heart failure     s/p AICD placement,    Deep vein thrombosis     Depression     elevated bilirubin d/t Gilbert's syndrome     confirmed by [facility] genetic testing, evaluated by hepatology    Encounter for blood transfusion     GERD (gastroesophageal reflux disease)     Hypertension     Pheochromocytoma, malignant     Right kidney mass     Sleep apnea     Thalassemia trait, alpha     Thyroid disease     Type 2 diabetes mellitus with hyperglycemia, without long-term current use of insulin 08/13/2020       Past Surgical History:   Procedure Laterality Date    APPENDECTOMY      BONE MARROW BIOPSY      CARDIAC DEFIBRILLATOR PLACEMENT Left 12/2016    CARDIAC ELECTROPHYSIOLOGY MAPPING AND ABLATION      CARDIAC ELECTROPHYSIOLOGY MAPPING AND ABLATION      COLONOSCOPY N/A 5/6/2022    Procedure: COLONOSCOPY;  Surgeon: Arely Betancourt MD;  Location: [facility] ([address] [place] [address]);  Service: Endoscopy;  Laterality: N/A;  heart transplant candidate/ EF 25% - 2nd floor/ defib - Biotronik - ER[facility]  Eliquis - per Dr. Cortez with [facility], Pt ok to hold Eliquis x 2 days prior-see media tab-outside correspondence dated 12/30/21  - [facility]  verbal instructions/portal instructions/email instructions - s    EYE SURGERY      due to running tears    FRACTURE SURGERY Left     hand 5th digit    HYSTERECTOMY      KNEE SURGERY Left 2016    hematoma    LIVER BIOPSY  10/24/2018    Minimal steatosis, predominantly macrovesicular, 1%, Minimal nonspecific portal inflammation, no fibrosis. No findings on biopsy to explain elevated bilirubin levels. Could be d/t Gilbert's =?- hemolysis    RIGHT HEART CATHETERIZATION Right  "12/07/2021    Procedure: INSERTION, CATHETER, RIGHT HEART;  Surgeon: Irving Cardenas MD;  Location: [facility] CATH LAB;  Service: Cardiology;  Laterality: Right;    RIGHT HEART CATHETERIZATION Right 12/19/2022    Procedure: INSERTION, CATHETER, RIGHT HEART;  Surgeon: Burke Camilo MD;  Location: [facility] CATH LAB;  Service: Cardiology;  Laterality: Right;    RIGHT HEART CATHETERIZATION Right 3/29/2023    Procedure: INSERTION, CATHETER, RIGHT HEART;  Surgeon: Katie Liriano DO;  Location: [facility] CATH LAB;  Service: Cardiology;  Laterality: Right;    TRANSJUGULAR BIOPSY OF LIVER N/A 10/24/2018    Procedure: BIOPSY, LIVER, TRANSJUGULAR APPROACH;  Surgeon: [facility];  Location: [facility] OR [address] [place] [address];  Service: Radiology;  Laterality: N/A;       Review of patient's allergies indicates:   Allergen Reactions    Penicillins Hives and Other (See Comments)    Iodinated contrast media Nausea And Vomiting    Oxycodone-acetaminophen Other (See Comments)     Nausea, Dizziness, Anxiety.  "I don't like how it makes me feel."   Given Hydromorphone 0.5mg IVP  Without problems.  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 Metabolism of other drugs not listed here may also be impacted by UGT1A1 enzyme activity.       Tramadol Nausea And Vomiting and Other (See Comments)     Other reaction(s): Other (See Comments)    Valsartan Other (See Comments)       Current " Facility-Administered Medications   Medication    sodium chloride 0.9% flush 10 mL     Facility-Administered Medications Ordered in Other Encounters   Medication    0.9%  NaCl infusion    vancomycin in dextrose 5 % 1 gram/250 mL IVPB 1,000 mg     Family History       Problem Relation (Age of Onset)    Cancer Mother    Cirrhosis Brother    Diabetes Brother    Heart attack Father    Heart disease Father, Sister, Brother, Sister, Brother    Hypertension Father, Brother          Tobacco Use    Smoking status: Never    Smokeless tobacco: Never   Substance and Sexual Activity    Alcohol use: Yes     Comment: up to 1 yr ago drank 2-3 drinks on occasion but sporadic    Drug use: No    Sexual activity: Yes     Partners: Male       Objective:     Vital Signs (Most Recent):  Temp: 97.8 °F (36.6 °C) (05/11/23 1640)  Pulse: 90 (05/11/23 1640)  Resp: 18 (05/11/23 1640)  BP: 114/74 (05/11/23 1640)  SpO2: (!) 92 % (05/11/23 1640) Vital Signs (24h Range):  Temp:  [97.8 °F (36.6 °C)] 97.8 °F (36.6 °C)  Pulse:  [90] 90  Resp:  [18] 18  SpO2:  [92 %] 92 %  BP: (114)/(74) 114/74     No data found.  There is no height or weight on file to calculate BMI.    No intake or output data in the 24 hours ending 05/11/23 2034       Physical Exam  Vitals reviewed.   Constitutional:       Appearance: She is obese.   HENT:      Head: Atraumatic.   Eyes:      General: No scleral icterus.     Conjunctiva/sclera: Conjunctivae normal.   Cardiovascular:      Rate and Rhythm: Normal rate.      Heart sounds: No murmur heard.  Pulmonary:      Effort: Pulmonary effort is normal.      Breath sounds: No wheezing.   Abdominal:      General: Bowel sounds are normal. There is distension.      Palpations: Abdomen is soft.      Tenderness: There is no abdominal tenderness.   Musculoskeletal:         General: Tenderness (r.knee) present.      Right lower leg: No edema.      Left lower leg: No edema.   Neurological:      General: No focal deficit present.      Mental  Status: She is alert.   Psychiatric:         Mood and Affect: Mood normal.         Behavior: Behavior normal.     Significant Labs:  CBC:  Recent Labs   Lab 05/11/23  1735   WBC 3.59*   RBC 6.08*   HGB 10.7*   HCT 37.2      MCV 61*   MCH 17.6*   MCHC 28.8*     BNP:  Recent Labs   Lab 05/11/23  1735   BNP 3,087*     CMP:  Recent Labs   Lab 05/10/23  0953 05/11/23  1735   * 150*   CALCIUM 9.7 9.2   ALBUMIN  --  3.7   PROT  --  6.6    141   K 4.4 3.8   CO2 29 26    104   BUN 64* 60*   CREATININE 2.6* 2.2*   ALKPHOS  --  59   ALT  --  20   AST  --  20   BILITOT  --  1.7*      Coagulation:   No results for input(s): PT, INR, APTT in the last 168 hours.  LDH:  No results for input(s): LDH in the last 72 hours.  Microbiology:  Microbiology Results (last 7 days)       ** No results found for the last 168 hours. **            I have reviewed all pertinent labs within the past 24 hours.    Diagnostic Results:  I have reviewed and interpreted all pertinent imaging results/findings within the past 24 hours.

## 2023-05-12 NOTE — PLAN OF CARE
Recommendations  1. Continue Cardiac diet-fluid per MD   2.Continue Boost/Boost pudding for optimization of protein and calorie intake   3. RD following     Goals: Meet % of EEN/EPN by RD f/u date  Nutrition Goal Status: goal not met  Communication of RD Recs: POC

## 2023-05-12 NOTE — CONSULTS
"  [facility] - Cardiology Stepdown  Adult Nutrition  Consult Note    SUMMARY     Recommendations  1. Continue Cardiac diet-fluid per MD   2.Continue Boost/Boost pudding for optimization of protein and calorie intake   3. RD following    Goals: Meet % of EEN/EPN by RD f/u date  Nutrition Goal Status: goal not met  Communication of RD Recs: POC    Assessment and Plan    Nutrition Problem  Increased protein/energy needs     Related to (etiology):   Physiological demands     Signs and Symptoms (as evidenced by):   HF    Interventions (treatment strategy):  Collaboration of nutrition care w/ other providers     Nutrition Diagnosis Status:   New       Reason for Assessment    Reason For Assessment: consult  Diagnosis: cardiac disease  Relevant Medical History: HTN, Type 2 DM, CKD  Interdisciplinary Rounds: attended   General Information Comments: 5/12/23: Spoke w/ pt at bedside, reports a good appetite usually however reports not liking meats at all. Reports typically consuming fruits, vegetables (only being able to tolerate these foods) Reports alot of pain in legs d/t gout. Reports typically consuming Ensure + Boost at home. Pt asked about protein bars, discussed pedialyte, pt reports constantly feeling tired all the time. NFPE completed, 5/12/23, pt is at risk for malnutriton at this time if po intake declines <50% meal consumption at this time. Discussed Boost addition and Boost pudding- pt agreeable to trying. LBM noted-5/11/23  Nutrition Discharge Planning: Cardiac diet    Nutrition Risk Screen    Nutrition Risk Screen: no indicators present    Nutrition/Diet History    Food Allergies: NKFA    Anthropometrics    Temp: 96.9 °F (36.1 °C)  Height: 5' 6" (167.6 cm)  Height (inches): 66 in  Weight: 84.4 kg (186 lb 1.1 oz)  Weight (lb): 186.07 lb  Ideal Body Weight (IBW), Female: 130 lb  BMI (Calculated): 30  BMI Grade: 30 - 34.9- obesity - grade I       Lab/Procedures/Meds    Pertinent Labs Reviewed: " reviewed  Pertinent Labs Comments: GFR 32.5, BUN 56  Pertinent Medications Reviewed: reviewed  Pertinent Medications Comments: -      Estimated/Assessed Needs    Weight Used For Calorie Calculations: 84.4 kg (186 lb 1.1 oz)  Energy Calorie Requirements (kcal): 1806  Energy Need Method: [place]-St Jeor (PAL 1.25)  Protein Requirements: 101 g (1.2 g/kg)  Weight Used For Protein Calculations: 84.4 kg (186 lb 1.1 oz)        RDA Method (mL): 1806         Nutrition Prescription Ordered    Current Diet Order: Cardiac diet    Evaluation of Received Nutrient/Fluid Intake    I/O: -120 ml since admit  Energy Calories Required: not meeting needs  Protein Required: not meeting needs  Fluid Required: not meeting needs  Total Fluid Intake (mL/kg): 1 ml or fluid per MD  Tolerance: tolerating  % Intake of Estimated Energy Needs: 25 - 50 %  % Meal Intake: 25 - 50 %    Nutrition Risk    Level of Risk/Frequency of Follow-up: low ((1x/week))       Monitor and Evaluation    Food and Nutrient Intake: energy intake, food and beverage intake  Food and Nutrient Adminstration: diet order  Knowledge/Beliefs/Attitudes: food and nutrition knowledge/skill, beliefs and attitudes  Physical Activity and Function: nutrition-related ADLs and IADLs, factors affecting access to physical activity  Anthropometric Measurements: height/length, weight, weight change, body mass index, growth pattern indices/percentile ranks  Biochemical Data, Medical Tests and Procedures: electrolyte and renal panel, gastrointestinal profile, glucose/endocrine profile, inflammatory profile, lipid profile  Nutrition-Focused Physical Findings: overall appearance, extremities, muscles and bones, head and eyes, skin       Nutrition Follow-Up    RD Follow-up?: Yes

## 2023-05-12 NOTE — H&P
[facility] - Cardiology Stepdown  Heart Transplant  H&P    Patient Name: Hafsa Hawley  MRN: [id number]  Admission Date: 5/11/2023  Attending Physician: Bertha Lorenzo MD  Primary Care Provider: Primary Doctor No  Principal Problem:Acute on chronic combined systolic and diastolic heart failure    Subjective:     History of Present Illness:  57 year old female with NICM (since 2013), Pulmonary hypertension, DM, HTN, permanent AF and ICD, who presented today after she was referred from clinic to be admitted for palliative dobutamine. Patient was previously evaluated and presented to committee on 3/9/22.  At the time she was declined for OHT or LVAD due to renal function, lung function and difficulty consistently following up with the team as well as poor medical adherence. Patient was seen yesterday in clinic by Dr. Orozco. Concerns regarding medication non-compliance and/or misunderstanding was raised during the visit. Patients renal function has deteriorated since her last discharge. She reports being un Entresto  mg, BID; however, it was noted that her Entresto was reduced during that hospital course. Today patient reports occasional shortness of breath on exertion but this has been unchanged since her last discharge. Patient endorses 2 pillow orthopnea, has not required any more lately and does report frequent PND. Patient denies any fever, chills, nausea, vomiting, hematemesis, cough, chest pain, palpitations, shortness of breath, abdominal pain, changes in bowel or urinary habits, no hematochezia or melena.    Past Medical History:   Diagnosis Date    Anemia     Arthritis     Atrial fibrillation     OAC    Chronic respiratory failure with hypoxia, on home oxygen therapy     2L with activity, off at rest.  Per Pulm  no overt evidence of ILD or COPD on PFTs and CT to explain O2 needs.    CKD (chronic kidney disease), stage IV 05/08/2018    Congestive heart failure     s/p AICD placement,    Deep vein  thrombosis     Depression     elevated bilirubin d/t Gilbert's syndrome     confirmed by [facility] genetic testing, evaluated by hepatology    Encounter for blood transfusion     GERD (gastroesophageal reflux disease)     Hypertension     Pheochromocytoma, malignant     Right kidney mass     Sleep apnea     Thalassemia trait, alpha     Thyroid disease     Type 2 diabetes mellitus with hyperglycemia, without long-term current use of insulin 08/13/2020       Past Surgical History:   Procedure Laterality Date    APPENDECTOMY      BONE MARROW BIOPSY      CARDIAC DEFIBRILLATOR PLACEMENT Left 12/2016    CARDIAC ELECTROPHYSIOLOGY MAPPING AND ABLATION      CARDIAC ELECTROPHYSIOLOGY MAPPING AND ABLATION      COLONOSCOPY N/A 5/6/2022    Procedure: COLONOSCOPY;  Surgeon: Arely Betancourt MD;  Location: [facility] ENDO (2ND FLR);  Service: Endoscopy;  Laterality: N/A;  heart transplant candidate/ EF 25% - 2nd floor/ defib - Biotronik - ERW  Eliquis - per Dr. Cortez with [facility], Pt ok to hold Eliquis x 2 days prior-see media tab-outside correspondence dated 12/30/21  - ER[facility]  verbal instructions/portal instructions/email instructions - s    EYE SURGERY      due to running tears    FRACTURE SURGERY Left     hand 5th digit    HYSTERECTOMY      KNEE SURGERY Left 2016    hematoma    LIVER BIOPSY  10/24/2018    Minimal steatosis, predominantly macrovesicular, 1%, Minimal nonspecific portal inflammation, no fibrosis. No findings on biopsy to explain elevated bilirubin levels. Could be d/t Gilbert's =?- hemolysis    RIGHT HEART CATHETERIZATION Right 12/07/2021    Procedure: INSERTION, CATHETER, RIGHT HEART;  Surgeon: Irving Cardenas MD;  Location: [facility] CATH LAB;  Service: Cardiology;  Laterality: Right;    RIGHT HEART CATHETERIZATION Right 12/19/2022    Procedure: INSERTION, CATHETER, RIGHT HEART;  Surgeon: Burke Camilo MD;  Location: [facility] CATH LAB;  Service: Cardiology;  Laterality: Right;    RIGHT HEART CATHETERIZATION Right  "3/29/2023    Procedure: INSERTION, CATHETER, RIGHT HEART;  Surgeon: Katie Liriano DO;  Location: [facility] CATH LAB;  Service: Cardiology;  Laterality: Right;    TRANSJUGULAR BIOPSY OF LIVER N/A 10/24/2018    Procedure: BIOPSY, LIVER, TRANSJUGULAR APPROACH;  Surgeon: Carmen Diagnostic Provider;  Location: [facility] OR [address] [place] [address];  Service: Radiology;  Laterality: N/A;       Review of patient's allergies indicates:   Allergen Reactions    Penicillins Hives and Other (See Comments)    Iodinated contrast media Nausea And Vomiting    Oxycodone-acetaminophen Other (See Comments)     Nausea, Dizziness, Anxiety.  "I don't like how it makes me feel."   Given Hydromorphone 0.5mg IVP  Without problems.  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 Metabolism of other drugs not listed here may also be impacted by UGT1A1 enzyme activity.       Tramadol Nausea And Vomiting and Other (See Comments)     Other reaction(s): Other (See Comments)    Valsartan Other (See Comments)       Current Facility-Administered Medications   Medication    sodium chloride 0.9% flush 10 mL     Facility-Administered Medications Ordered in Other Encounters   Medication    0.9%  NaCl infusion    vancomycin in dextrose 5 % 1 gram/250 mL IVPB 1,000 mg     Family History       Problem Relation (Age of Onset)    Cancer Mother    Cirrhosis Brother    Diabetes Brother    Heart attack Father    Heart disease Father, Sister, " Brother, Sister, Brother    Hypertension Father, Brother          Tobacco Use    Smoking status: Never    Smokeless tobacco: Never   Substance and Sexual Activity    Alcohol use: Yes     Comment: up to 1 yr ago drank 2-3 drinks on occasion but sporadic    Drug use: No    Sexual activity: Yes     Partners: Male       Objective:     Vital Signs (Most Recent):  Temp: 97.8 °F (36.6 °C) (05/11/23 1640)  Pulse: 90 (05/11/23 1640)  Resp: 18 (05/11/23 1640)  BP: 114/74 (05/11/23 1640)  SpO2: (!) 92 % (05/11/23 1640) Vital Signs (24h Range):  Temp:  [97.8 °F (36.6 °C)] 97.8 °F (36.6 °C)  Pulse:  [90] 90  Resp:  [18] 18  SpO2:  [92 %] 92 %  BP: (114)/(74) 114/74     No data found.  There is no height or weight on file to calculate BMI.    No intake or output data in the 24 hours ending 05/11/23 2034       Physical Exam  Vitals reviewed.   Constitutional:       Appearance: She is obese.   HENT:      Head: Atraumatic.   Eyes:      General: No scleral icterus.     Conjunctiva/sclera: Conjunctivae normal.   Cardiovascular:      Rate and Rhythm: Normal rate.      Heart sounds: No murmur heard.  Pulmonary:      Effort: Pulmonary effort is normal.      Breath sounds: No wheezing.   Abdominal:      General: Bowel sounds are normal. There is distension.      Palpations: Abdomen is soft.      Tenderness: There is no abdominal tenderness.   Musculoskeletal:         General: Tenderness (r.knee) present.      Right lower leg: No edema.      Left lower leg: No edema.   Neurological:      General: No focal deficit present.      Mental Status: She is alert.   Psychiatric:         Mood and Affect: Mood normal.         Behavior: Behavior normal.     Significant Labs:  CBC:  Recent Labs   Lab 05/11/23  1735   WBC 3.59*   RBC 6.08*   HGB 10.7*   HCT 37.2      MCV 61*   MCH 17.6*   MCHC 28.8*     BNP:  Recent Labs   Lab 05/11/23  1735   BNP 3,087*     CMP:  Recent Labs   Lab 05/10/23  0953 05/11/23  1735   * 150*   CALCIUM 9.7 9.2    ALBUMIN  --  3.7   PROT  --  6.6    141   K 4.4 3.8   CO2 29 26    104   BUN 64* 60*   CREATININE 2.6* 2.2*   ALKPHOS  --  59   ALT  --  20   AST  --  20   BILITOT  --  1.7*      Coagulation:   No results for input(s): PT, INR, APTT in the last 168 hours.  LDH:  No results for input(s): LDH in the last 72 hours.  Microbiology:  Microbiology Results (last 7 days)       ** No results found for the last 168 hours. **            I have reviewed all pertinent labs within the past 24 hours.    Diagnostic Results:  I have reviewed and interpreted all pertinent imaging results/findings within the past 24 hours.    Assessment/Plan:     * Acute on chronic combined systolic and diastolic heart failure  - NICM   -Last 2D Echo (3/27/2023): LVEF 15%, The left ventricle is severely enlarged.Normal right ventricular size with moderately reduced right ventricular systolic function.Grade II left ventricular diastolic dysfunction. LVIDd: 7.07 cm  -Hypervolemic on examination today  - Current diuretic regimen: Start Lasix 80 mg, TID  - Home diuretic: Bumex 4 mg, BID  - Home GDMT: Jardiance, Hydralazine 25, Isordil 30 TID, Entresto to 97mg, and Toprol to 100 mg  - Will plan to resume Home GDMT as tolerated and re-assess renal function in the AM. At this time will hold off starting   - Last RHC 3/29/23 RA 9, PCWP 16, PA 58/29, mean PA 39, PA sat 45%, AO sat 90% on RA, PVR 5.6 BOLDEN, CO/CI 4.1/2.03, SVR 1151, BP 88/58 , MAP 68, HR 75 SR Hb 10.7 Jayson 3  - Patient is not currently being evaluated for OHTx/VAD; Will discuss with patient and consult palliative care in the AM  - Monitor electrolytes closely. Maintain Mg >2 and K >4  - HF Pathway: Sodium restriction <2 g, Fluid restriction < 1500 mL, Strict I/Os, Daily weights    CKD (chronic kidney disease), stage IV  - Cr. 2.6 yesterday down to 2.2 today  - Baseline renal function appears to be around 1.9-2.2  - Worsening renal function may have been in the setting of high dose  Entresto  - Monitor renal function  - Avoid Nephrotoxic agents  - Monitor urine output     Restrictive lung disease  - PFTs 03/15/2023 demonstrates severe restriction and severely reduced MVV there is also the possibility of mild obstruction per report.  - Continue with oxygen supplementation as needed    Type 2 diabetes mellitus with stage 4 chronic kidney disease, with long-term current use of insulin  - Most recent A1c: 5.9  - Hold Jardiance (Also given CrCl)  - FS + LSS      Zhane Mckeon MD  Heart Transplant  [facility] - Cardiology Stepdown

## 2023-05-12 NOTE — ASSESSMENT & PLAN NOTE
- NICM  - Last RHC (3/29/2023): RA 9, PCWP 16, PA 58/29/39, PVR 5.6 BOLDEN, CO/CI 4.1/2.03, SVR 1151, Jayson 3  - Last Echo (3/27/2023): EF 15%. LV severely enlarged w/ eccentric hypertrophy and severely decreased systolic function. Grade 2 LV diastolic dysfunction. Normal RV size and moderately reduced systolic function. Biatrial enlargment. Mild MR. Small pericardial effusion. PA sys pressure 51mmHg.   - Home GDMT: Jardiance, Hydralaine 25mg tid, Isordil 30mg tid, Entresto 46mg bid, and toprolol 100mg daily.   - Home diuretic regimen: Bumex 4mg bid  - Euvolemic on exam today. Stop IV lasix. Will restart home GDMT and home diuretic regimen.   - Ionotropes: will start  2.5 for palliative purposes.   - Strict I&Os and daily weights.   - Maintain K>4 and Mg> 2.

## 2023-05-12 NOTE — PROCEDURES
"Hafsa Hawley is a 57 y.o. female patient.    Temp: 96.9 °F (36.1 °C) (05/12/23 1156)  Pulse: 84 (05/12/23 1509)  Resp: 18 (05/12/23 1156)  BP: 116/74 (05/12/23 1156)  SpO2: 97 % (05/12/23 1156)  Weight: 84.4 kg (186 lb 1.1 oz) (05/12/23 0400)  Height: 5' 6" (167.6 cm) (05/12/23 1441)    PICC  Date/Time: 5/12/2023 3:35 PM  Performed by: Dia Izaguirre RN  Assisting provider: Steve Sauceda RN  Consent Done: Yes  Time out: Immediately prior to procedure a time out was called to verify the correct patient, procedure, equipment, support staff and site/side marked as required  Indications: med administration and vascular access  Anesthesia: local infiltration  Local anesthetic: lidocaine 1% without epinephrine  Anesthetic Total (mL): 3  Description of findings: PICC  Preparation: skin prepped with ChloraPrep  Skin prep agent dried: skin prep agent completely dried prior to procedure  Sterile barriers: all five maximum sterile barriers used - cap, mask, sterile gown, sterile gloves, and large sterile sheet  Hand hygiene: hand hygiene performed prior to central venous catheter insertion  Location details: right brachial  Catheter type: double lumen  Catheter size: 5 Fr  Catheter Length: 38cm    Ultrasound guidance: yes  Vessel Caliber: medium and patent, compressibility normal  Vascular Doppler: not done  Needle advanced into vessel with real time Ultrasound guidance.  Guidewire confirmed in vessel.  Image recorded and saved.  Sterile sheath used.  Number of attempts: 1  Post-procedure: blood return through all ports, chlorhexidine patch and sterile dressing applied  Technical procedures used: 3CG  Specimens: No  Implants: No  Assessment: placement verified by x-ray  Complications: none        Name   5/12/2023    "

## 2023-05-12 NOTE — ASSESSMENT & PLAN NOTE
[place] on CKD stage 4 most likely secondary to hypervolemia. Baseline Cr around 1.5 (5/2/2023). Improving.     - Expect to improve w/ diuresis.   - Avoid nephrotoxic agents.  - Will continue to monitor.

## 2023-05-12 NOTE — HPI
57 year old female with Stage D HF 2/2 NICM (since 2013), Pulmonary hypertension, DM, HTN, permanent AF and ICD, who presented today after she was referred from clinic to be admitted for palliative dobutamine. Patient was previously evaluated and presented to committee on 3/9/22.  At the time she was declined for OHT or LVAD due to renal function, lung function and difficulty consistently following up with the team as well as poor medical adherence. Patient was seen yesterday in clinic by Dr. Orozco. Concerns regarding medication non-compliance and/or misunderstanding was raised during the visit. Patients renal function has deteriorated since her last discharge. She reports being un Entresto  mg, BID; however, it was noted that her Entresto was reduced during that hospital course. Today patient reports occasional shortness of breath on exertion but this has been unchanged since her last discharge. Patient endorses 2 pillow orthopnea, has not required any more lately and does report frequent PND. Patient denies any fever, chills, nausea, vomiting, hematemesis, cough, chest pain, palpitations, shortness of breath, abdominal pain, changes in bowel or urinary habits, no hematochezia or melena.

## 2023-05-12 NOTE — SUBJECTIVE & OBJECTIVE
"Interval History: NAEON. No acute complaints.     Continuous Infusions:   DOBUTamine IV infusion (non-titrating)       Scheduled Meds:   amiodarone  200 mg Oral Daily    apixaban  5 mg Oral BID    atorvastatin  40 mg Oral QHS    bumetanide  4 mg Oral BID loop    EScitalopram oxalate  5 mg Oral Daily    hydrALAZINE  25 mg Oral Q8H    isosorbide dinitrate  30 mg Oral TID    pantoprazole  40 mg Oral Daily    sacubitriL-valsartan  1 tablet Oral BID    sodium chloride 0.9%  10 mL Intravenous Q6H     PRN Meds:dextrose 10%, dextrose 10%, dextrose, dextrose, glucagon (human recombinant), insulin aspart U-100, sodium chloride 0.9%, Flushing PICC Protocol **AND** sodium chloride 0.9% **AND** sodium chloride 0.9%    Review of patient's allergies indicates:   Allergen Reactions    Penicillins Hives and Other (See Comments)    Iodinated contrast media Nausea And Vomiting    Oxycodone-acetaminophen Other (See Comments)     Nausea, Dizziness, Anxiety.  "I don't like how it makes me feel."   Given Hydromorphone 0.5mg IVP  Without problems.  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 Metabolism of other drugs not listed here may also be impacted by UGT1A1 enzyme activity.       Tramadol Nausea And Vomiting and Other (See Comments)     Other reaction(s): Other (See Comments)    Valsartan Other (See Comments)     Objective:     Vital Signs (Most Recent):  Temp: 96.9 °F (36.1 °C) (05/12/23 " 1156)  Pulse: 84 (05/12/23 1509)  Resp: 18 (05/12/23 1156)  BP: 116/74 (05/12/23 1156)  SpO2: 97 % (05/12/23 1156) Vital Signs (24h Range):  Temp:  [96.9 °F (36.1 °C)-98.3 °F (36.8 °C)] 96.9 °F (36.1 °C)  Pulse:  [69-90] 84  Resp:  [18-20] 18  SpO2:  [92 %-97 %] 97 %  BP: (114-125)/(66-86) 116/74     Patient Vitals for the past 72 hrs (Last 3 readings):   Weight   05/12/23 0400 84.4 kg (186 lb 1.1 oz)   05/11/23 2104 85.3 kg (188 lb 0.8 oz)     Body mass index is 30.03 kg/m².      Intake/Output Summary (Last 24 hours) at 5/12/2023 1602  Last data filed at 5/12/2023 1454  Gross per 24 hour   Intake 980 ml   Output 3100 ml   Net -2120 ml       Hemodynamic Parameters:            Physical Exam  Constitutional:       Appearance: She is obese.      Comments: Alert, Oriented, No acute distress   HENT:      Head: Normocephalic and atraumatic.   Eyes:      Conjunctiva/sclera: Conjunctivae normal.   Neck:      Comments: JVP 8cm of H2O at 45 degrees.   Cardiovascular:      Rate and Rhythm: Normal rate and regular rhythm.   Pulmonary:      Comments: Normal effort, Clear to auscultation   Abdominal:      Comments: Soft, Non-tender, Non-distended   Musculoskeletal:      Cervical back: Normal range of motion.      Comments: No peripheral edema   Skin:     Comments: Dry, Intact, Warm, Capillary refill <2 seconds.    Neurological:      Mental Status: She is alert and oriented to person, place, and time.      Comments: Normal speech   Psychiatric:         Mood and Affect: Mood and affect normal.          Significant Labs:  CBC:  Recent Labs   Lab 05/11/23  1735 05/12/23  0552   WBC 3.59* 3.32*   RBC 6.08* 5.78*   HGB 10.7* 9.9*   HCT 37.2 36.2*    180   MCV 61* 63*   MCH 17.6* 17.1*   MCHC 28.8* 27.3*     BNP:  Recent Labs   Lab 05/11/23  1735   BNP 3,087*     CMP:  Recent Labs   Lab 05/10/23  0953 05/11/23  1735 05/12/23  0552   * 150* 92   CALCIUM 9.7 9.2 9.2   ALBUMIN  --  3.7 3.3*   PROT  --  6.6 5.9*    141  140   K 4.4 3.8 3.7   CO2 29 26 25    104 105   BUN 64* 60* 56*   CREATININE 2.6* 2.2* 1.8*   ALKPHOS  --  59 51*   ALT  --  20 17   AST  --  20 21   BILITOT  --  1.7* 1.5*      Coagulation:   No results for input(s): PT, INR, APTT in the last 168 hours.  LDH:  No results for input(s): LDH in the last 72 hours.  Microbiology:  Microbiology Results (last 7 days)       ** No results found for the last 168 hours. **            I have reviewed all pertinent labs within the past 24 hours.    Estimated Creatinine Clearance: 37.7 mL/min (A) (based on SCr of 1.8 mg/dL (H)).    Diagnostic Results:  I have reviewed all pertinent imaging results/findings within the past 24 hours.

## 2023-05-12 NOTE — PROGRESS NOTES
Admit Note     Heart Transplant  spoke with pt by phone in order to assess needs.  Pt is familiar with the role of the  from past hospital admissions.  Pt agreed to complete the assessment by phone.       Patient is a 57 y.o.  female, admitted for palliative , acute on chronic combined systolic and diastolic heart failure, CKD, restrictive  lung disease and type 2 diabetes with insulin.  Pt reports she was dx with COVID in the past and feels many of her medical issues are due to COVID.  Pt is currently not a candidate for transplant.   Pt has had several admissions over the last few months.     Patient admitted to [facility] on 5/11/2023 .  At this time, patient presents by phone as alert and oriented x 4, calm, and communicative.  At this time, patients caregiver is currently not in attendance.     Household/Family Systems     Patient resides with patient's  spouse and 17 yr old granddaughter Shavonne , at     [address] [place] [address].    45 minutes from [facility]    Support system includes sister, Jeanie Jaimes, two nieces, Laura and Beulah. The pt also has two adult children. Pt's daughter is pregnant due in July, but is experiencing some medical challenges.  Pt's son is also having some challenges with mental health and legal.  Pt reports increased anxiety due to stressors with her children.   Support level of spouse is unclear.     Patient does not have dependents that are need of being cared for.     Patients primary caregiver is self with support from extended family/sibling when needed.     Pt's cell: [phone]    Emergency contacts:   Erika Estrada (daughter, HCPA, lives in [place]) [phone]  Jeanie Jaimes (sister, drives, lives in [place]) [phone]  Laura Jaimes ( niece, drives, lives in [place] and works) [phone]  Beulah Hawley (niece, lives in [place], drives and works) [phone]  .    During admission, patient's caregiver plans to stay at home.   Confirmed patient and patients caregivers do have access to reliable transportation.  Pt, spouse and extended family drive.    Cognitive Status/Learning     Patient reports reading ability as college and states patient does not have difficulty with reading, writing, seeing, hearing, and learning.   Pt reports continued difficulty with memory/comprehension due to anxiety and family stress.  Pt reports her anxiety is increasing.    Patient reports patient learns best by reading.     Needed: No.   Highest education level: Attended College/Technical School    Vocation/Disability   .  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   Pt's spouse was a , currently receiving SS disability.   Pt reports limited income, but manageable: $914.00 Pt receives SNAP.      Adherence     Patient reports a medium level of adherence to patients health care regimen.Pt reports she is keeping her MD appointments and is trying to follow her diet. Pt reports difficulty with medication management.     Adherence counseling and education provided. Patient verbalizes understanding.    Substance Use    Patient reports the following substance usage.    Tobacco: none, patient denies any use.  Alcohol: none, patient denies any use.  Illicit Drugs/Non-prescribed Medications: none, patient denies any use.  Patient states clear understanding of the potential impact of substance use.  Substance abstinence/cessation counseling, education and resources provided and reviewed.     Services Utilizing/ADLS    Infusion Service: Prior to admission, patient utilizing? no Pt reports she does not have a IV infusion preference.    Home Health: Prior to admission, patient utilizing? no Pt reports she does not have a HH preference.     Pt could benefit from HH, medication management.     DME: Prior to admission, yes pt has a  home concentrator and portables.  Pt reports 02 is set at 2LPM, however she does not use the 02 all the time.  Pt stated she does not need 02 to travel home from the hospital.  Pt also has a Rollater and shower chair. Pt reports she still needs to get a sleep study done for a CPAP.  [facility] has provided 02 in the past.     Pulmonary/Cardiac Rehab: Prior to admission, no  Dialysis:  Prior to admission, no  Transplant Specialty Pharmacy:  Prior to admission, no.    Prior to admission, patient reports patient was mostlyindependent with ADLS and was driving.  Patient reports patient is not able to care for self at this time due to compromised medical condition (as documented in medical record) and physical weakness..  Patient indicates a willingness to care for self once medically cleared to do so.    Insurance/Medications    Insured by   Payer/Plan Subscr  Sex Relation Sub. Ins. ID Effective Group Num   1. MEDICAID - AM* [employer] 1965 Female Self [id number]                                   [address]      Primary Insurance (for UNOS reporting): Public Insurance - Medicaid  Secondary Insurance (for UNOS reporting): None    Patient reports patient is able to obtain and afford medications at this time and at time of discharge.  Pt reports no difficulty affording insulin.    Living Will/Healthcare Power of     Patient states patient has a HCPA, but not a LW.   provided education regarding LW and HCPA and the completion of forms.    Coping/Mental Health    Patient reports she is coping adequately with current hospital stay. Pt is communicating with family by phone. Patient indicates mental health difficulties.   Pt reports difficulty with anxiety due to the pt's own health issues and family stress. The pt reports she does not feel suicidal and does feel safe in her own home.   Pt reports she takes Zoloft daily, but is not sure if it's helping.  Pt  reports she does have family support and does not want to pursue out pt counseling at this time.   Pt reports no additional needs at this time.    provided active listening and encouragement.    Discharge Planning    At time of discharge, patient plans to return to patient's home under the care of self.  Patients  family  will transport patient.  Per rounds today, expected discharge date has not been medically determined at this time. Patient and patients caregiver  verbalize understanding and are involved in treatment planning and discharge process.    Additional Concerns    Patient is being followed for needs, education, resources, information, emotional support, supportive counseling, and for supportive and skilled discharge plan of care.  providing ongoing psychosocial support, education, resources and d/c planning as needed.  SW remains available. Patient verbalizes understanding and agreement with information reviewed, social work availability, and how to access available resources as needed.

## 2023-05-12 NOTE — ASSESSMENT & PLAN NOTE
- PFTs 03/15/2023 demonstrates severe restriction and severely reduced MVV there is also the possibility of mild obstruction per report.  - Continue with oxygen supplementation as needed

## 2023-05-12 NOTE — PROGRESS NOTES
"[facility] - [facility]  Heart Transplant  Progress Note    Patient Name: Hafsa Hawley  MRN: [id number]  Admission Date: 5/11/2023  Hospital Length of Stay: 1 days  Attending Physician: Bertha Lorenzo MD  Primary Care Provider: Primary Doctor No  Principal Problem:Acute on chronic combined systolic and diastolic heart failure    Subjective:     Interval History: NAEON. No acute complaints.     Continuous Infusions:   DOBUTamine IV infusion (non-titrating)       Scheduled Meds:   amiodarone  200 mg Oral Daily    apixaban  5 mg Oral BID    atorvastatin  40 mg Oral QHS    bumetanide  4 mg Oral BID loop    EScitalopram oxalate  5 mg Oral Daily    hydrALAZINE  25 mg Oral Q8H    isosorbide dinitrate  30 mg Oral TID    pantoprazole  40 mg Oral Daily    sacubitriL-valsartan  1 tablet Oral BID    sodium chloride 0.9%  10 mL Intravenous Q6H     PRN Meds:dextrose 10%, dextrose 10%, dextrose, dextrose, glucagon (human recombinant), insulin aspart U-100, sodium chloride 0.9%, Flushing PICC Protocol **AND** sodium chloride 0.9% **AND** sodium chloride 0.9%    Review of patient's allergies indicates:   Allergen Reactions    Penicillins Hives and Other (See Comments)    Iodinated contrast media Nausea And Vomiting    Oxycodone-acetaminophen Other (See Comments)     Nausea, Dizziness, Anxiety.  "I don't like how it makes me feel."   Given Hydromorphone 0.5mg IVP  Without problems.  Other reaction(s): Other (See Comments)    Clonazepam Other (See Comments)    Diovan hct [valsartan-hydrochlorothiazide] Other (See Comments)     Causes coughing    Iodine Other (See Comments)    Irinotecan      Pt has homozygosity for the TA7 promoter variant that places this individual at significantly increased risk for   severe neutropenia (grade 4) when treated with the standard dose of irinotecan (risk approximately 50%).   Other drugs that have been demonstrated to be impacted by homozygosity for the UGT1A1 TA7 promoter " variant include pazopanib, nilotinib, atazanavir, and belinostat. Metabolism of other drugs not listed here may also be impacted by UGT1A1 enzyme activity.       Tramadol Nausea And Vomiting and Other (See Comments)     Other reaction(s): Other (See Comments)    Valsartan Other (See Comments)     Objective:     Vital Signs (Most Recent):  Temp: 96.9 °F (36.1 °C) (05/12/23 1156)  Pulse: 84 (05/12/23 1509)  Resp: 18 (05/12/23 1156)  BP: 116/74 (05/12/23 1156)  SpO2: 97 % (05/12/23 1156) Vital Signs (24h Range):  Temp:  [96.9 °F (36.1 °C)-98.3 °F (36.8 °C)] 96.9 °F (36.1 °C)  Pulse:  [69-90] 84  Resp:  [18-20] 18  SpO2:  [92 %-97 %] 97 %  BP: (114-125)/(66-86) 116/74     Patient Vitals for the past 72 hrs (Last 3 readings):   Weight   05/12/23 0400 84.4 kg (186 lb 1.1 oz)   05/11/23 2104 85.3 kg (188 lb 0.8 oz)     Body mass index is 30.03 kg/m².      Intake/Output Summary (Last 24 hours) at 5/12/2023 1602  Last data filed at 5/12/2023 1454  Gross per 24 hour   Intake 980 ml   Output 3100 ml   Net -2120 ml       Hemodynamic Parameters:            Physical Exam  Constitutional:       Appearance: She is obese.      Comments: Alert, Oriented, No acute distress   HENT:      Head: Normocephalic and atraumatic.   Eyes:      Conjunctiva/sclera: Conjunctivae normal.   Neck:      Comments: JVP 8cm of H2O at 45 degrees.   Cardiovascular:      Rate and Rhythm: Normal rate and regular rhythm.   Pulmonary:      Comments: Normal effort, Clear to auscultation   Abdominal:      Comments: Soft, Non-tender, Non-distended   Musculoskeletal:      Cervical back: Normal range of motion.      Comments: No peripheral edema   Skin:     Comments: Dry, Intact, Warm, Capillary refill <2 seconds.    Neurological:      Mental Status: She is alert and oriented to person, place, and time.      Comments: Normal speech   Psychiatric:         Mood and Affect: Mood and affect normal.          Significant Labs:  CBC:  Recent Labs   Lab 05/11/23  4807  05/12/23  0552   WBC 3.59* 3.32*   RBC 6.08* 5.78*   HGB 10.7* 9.9*   HCT 37.2 36.2*    180   MCV 61* 63*   MCH 17.6* 17.1*   MCHC 28.8* 27.3*     BNP:  Recent Labs   Lab 05/11/23  1735   BNP 3,087*     CMP:  Recent Labs   Lab 05/10/23  0953 05/11/23  1735 05/12/23  0552   * 150* 92   CALCIUM 9.7 9.2 9.2   ALBUMIN  --  3.7 3.3*   PROT  --  6.6 5.9*    141 140   K 4.4 3.8 3.7   CO2 29 26 25    104 105   BUN 64* 60* 56*   CREATININE 2.6* 2.2* 1.8*   ALKPHOS  --  59 51*   ALT  --  20 17   AST  --  20 21   BILITOT  --  1.7* 1.5*      Coagulation:   No results for input(s): PT, INR, APTT in the last 168 hours.  LDH:  No results for input(s): LDH in the last 72 hours.  Microbiology:  Microbiology Results (last 7 days)       ** No results found for the last 168 hours. **            I have reviewed all pertinent labs within the past 24 hours.    Estimated Creatinine Clearance: 37.7 mL/min (A) (based on SCr of 1.8 mg/dL (H)).    Diagnostic Results:  I have reviewed all pertinent imaging results/findings within the past 24 hours.    Assessment and Plan:     57 year old female with NICM (since 2013), Pulmonary hypertension, DM, HTN, permanent AF and ICD, who presented today after she was referred from clinic to be admitted for palliative dobutamine. Patient was previously evaluated and presented to committee on 3/9/22.  At the time she was declined for OHT or LVAD due to renal function, lung function and difficulty consistently following up with the team as well as poor medical adherence. Patient was seen yesterday in clinic by Dr. Orozco. Concerns regarding medication non-compliance and/or misunderstanding was raised during the visit. Patients renal function has deteriorated since her last discharge. She reports being un Entresto  mg, BID; however, it was noted that her Entresto was reduced during that hospital course. Today patient reports occasional shortness of breath on exertion but this  has been unchanged since her last discharge. Patient endorses 2 pillow orthopnea, has not required any more lately and does report frequent PND. Patient denies any fever, chills, nausea, vomiting, hematemesis, cough, chest pain, palpitations, shortness of breath, abdominal pain, changes in bowel or urinary habits, no hematochezia or melena.      * Acute on chronic combined systolic and diastolic heart failure  - NICM  - Last RHC (3/29/2023): RA 9, PCWP 16, PA 58/29/39, PVR 5.6 BOLDEN, CO/CI 4.1/2.03, SVR 1151, Jayson 3  - Last Echo (3/27/2023): EF 15%. LV severely enlarged w/ eccentric hypertrophy and severely decreased systolic function. Grade 2 LV diastolic dysfunction. Normal RV size and moderately reduced systolic function. Biatrial enlargment. Mild MR. Small pericardial effusion. PA sys pressure 51mmHg.   - Home GDMT: Jardiance, Hydralaine 25mg tid, Isordil 30mg tid, Entresto 46mg bid, and toprolol 100mg daily.   - Home diuretic regimen: Bumex 4mg bid  - Euvolemic on exam today. Stop IV lasix. Will restart home GDMT and home diuretic regimen.   - Ionotropes: will start  2.5 for palliative purposes.   - Strict I&Os and daily weights.   - Maintain K>4 and Mg> 2.     CKD (chronic kidney disease), stage IV  - Cr. 2.6 yesterday down to 2.2 today  - Baseline renal function appears to be around 1.9-2.2  - Worsening renal function may have been in the setting of high dose Entresto  - Monitor renal function  - Avoid Nephrotoxic agents  - Monitor urine output     Restrictive lung disease  - PFTs 03/15/2023 demonstrates severe restriction and severely reduced MVV there is also the possibility of mild obstruction per report.  - Continue with oxygen supplementation as needed    [place] (acute kidney injury)  WASHINGTON on CKD stage 4 most likely secondary to hypervolemia. Baseline Cr around 1.5 (5/2/2023). Improving.     - Expect to improve w/ diuresis.   - Avoid nephrotoxic agents.  - Will continue to monitor.     Type 2 diabetes  mellitus with stage 4 chronic kidney disease, with long-term current use of insulin  - Most recent A1c: 5.9  - Hold Jardiance (Also given CrCl)  - FS + LSS        Angelita Downing MD  Heart Transplant  [facility] - [facility]

## 2023-05-13 LAB
ALBUMIN SERPL BCP-MCNC: 3.4 G/DL (ref 3.5–5.2)
ALLENS TEST: ABNORMAL
ALP SERPL-CCNC: 52 U/L (ref 55–135)
ALT SERPL W/O P-5'-P-CCNC: 16 U/L (ref 10–44)
ANION GAP SERPL CALC-SCNC: 13 MMOL/L (ref 8–16)
ANION GAP SERPL CALC-SCNC: 13 MMOL/L (ref 8–16)
AST SERPL-CCNC: 26 U/L (ref 10–40)
BASOPHILS # BLD AUTO: 0.02 K/UL (ref 0–0.2)
BASOPHILS NFR BLD: 0.6 % (ref 0–1.9)
BILIRUB SERPL-MCNC: 1.6 MG/DL (ref 0.1–1)
BUN SERPL-MCNC: 51 MG/DL (ref 6–20)
BUN SERPL-MCNC: 53 MG/DL (ref 6–20)
CALCIUM SERPL-MCNC: 9 MG/DL (ref 8.7–10.5)
CALCIUM SERPL-MCNC: 9.3 MG/DL (ref 8.7–10.5)
CHLORIDE SERPL-SCNC: 104 MMOL/L (ref 95–110)
CHLORIDE SERPL-SCNC: 104 MMOL/L (ref 95–110)
CO2 SERPL-SCNC: 24 MMOL/L (ref 23–29)
CO2 SERPL-SCNC: 26 MMOL/L (ref 23–29)
CREAT SERPL-MCNC: 1.9 MG/DL (ref 0.5–1.4)
CREAT SERPL-MCNC: 2.1 MG/DL (ref 0.5–1.4)
DELSYS: ABNORMAL
DIFFERENTIAL METHOD: ABNORMAL
EOSINOPHIL # BLD AUTO: 0 K/UL (ref 0–0.5)
EOSINOPHIL NFR BLD: 1.2 % (ref 0–8)
ERYTHROCYTE [DISTWIDTH] IN BLOOD BY AUTOMATED COUNT: 28 % (ref 11.5–14.5)
ERYTHROCYTE [SEDIMENTATION RATE] IN BLOOD BY WESTERGREN METHOD: 2 MM/H
EST. GFR  (NO RACE VARIABLE): 27 ML/MIN/1.73 M^2
EST. GFR  (NO RACE VARIABLE): 30.4 ML/MIN/1.73 M^2
FIO2: 28
GLUCOSE SERPL-MCNC: 126 MG/DL (ref 70–110)
GLUCOSE SERPL-MCNC: 80 MG/DL (ref 70–110)
HCO3 UR-SCNC: 32.9 MMOL/L (ref 24–28)
HCT VFR BLD AUTO: 37.3 % (ref 37–48.5)
HGB BLD-MCNC: 10.6 G/DL (ref 12–16)
IMM GRANULOCYTES # BLD AUTO: 0.02 K/UL (ref 0–0.04)
IMM GRANULOCYTES NFR BLD AUTO: 0.6 % (ref 0–0.5)
LYMPHOCYTES # BLD AUTO: 1 K/UL (ref 1–4.8)
LYMPHOCYTES NFR BLD: 28.9 % (ref 18–48)
MAGNESIUM SERPL-MCNC: 1.9 MG/DL (ref 1.6–2.6)
MCH RBC QN AUTO: 18.1 PG (ref 27–31)
MCHC RBC AUTO-ENTMCNC: 28.4 G/DL (ref 32–36)
MCV RBC AUTO: 64 FL (ref 82–98)
MODE: ABNORMAL
MONOCYTES # BLD AUTO: 0.5 K/UL (ref 0.3–1)
MONOCYTES NFR BLD: 13.2 % (ref 4–15)
NEUTROPHILS # BLD AUTO: 1.9 K/UL (ref 1.8–7.7)
NEUTROPHILS NFR BLD: 55.5 % (ref 38–73)
NRBC BLD-RTO: 0 /100 WBC
PCO2 BLDA: 58.6 MMHG (ref 35–45)
PH SMN: 7.36 [PH] (ref 7.35–7.45)
PLATELET # BLD AUTO: 154 K/UL (ref 150–450)
PMV BLD AUTO: ABNORMAL FL (ref 9.2–12.9)
PO2 BLDA: 29 MMHG (ref 40–60)
POC BE: 7 MMOL/L
POC SATURATED O2: 49 % (ref 95–100)
POC TCO2: 35 MMOL/L (ref 24–29)
POCT GLUCOSE: 124 MG/DL (ref 70–110)
POCT GLUCOSE: 131 MG/DL (ref 70–110)
POCT GLUCOSE: 163 MG/DL (ref 70–110)
POCT GLUCOSE: 207 MG/DL (ref 70–110)
POTASSIUM SERPL-SCNC: 3.9 MMOL/L (ref 3.5–5.1)
POTASSIUM SERPL-SCNC: 4.4 MMOL/L (ref 3.5–5.1)
PROT SERPL-MCNC: 6.3 G/DL (ref 6–8.4)
RBC # BLD AUTO: 5.85 M/UL (ref 4–5.4)
SAMPLE: ABNORMAL
SITE: ABNORMAL
SODIUM SERPL-SCNC: 141 MMOL/L (ref 136–145)
SODIUM SERPL-SCNC: 143 MMOL/L (ref 136–145)
WBC # BLD AUTO: 3.42 K/UL (ref 3.9–12.7)

## 2023-05-13 PROCEDURE — 80053 COMPREHEN METABOLIC PANEL: CPT | Performed by: STUDENT IN AN ORGANIZED HEALTH CARE EDUCATION/TRAINING PROGRAM

## 2023-05-13 PROCEDURE — 83735 ASSAY OF MAGNESIUM: CPT | Performed by: STUDENT IN AN ORGANIZED HEALTH CARE EDUCATION/TRAINING PROGRAM

## 2023-05-13 PROCEDURE — 36415 COLL VENOUS BLD VENIPUNCTURE: CPT | Performed by: STUDENT IN AN ORGANIZED HEALTH CARE EDUCATION/TRAINING PROGRAM

## 2023-05-13 PROCEDURE — 25000003 PHARM REV CODE 250: Performed by: INTERNAL MEDICINE

## 2023-05-13 PROCEDURE — 82803 BLOOD GASES ANY COMBINATION: CPT

## 2023-05-13 PROCEDURE — 25000003 PHARM REV CODE 250: Performed by: STUDENT IN AN ORGANIZED HEALTH CARE EDUCATION/TRAINING PROGRAM

## 2023-05-13 PROCEDURE — 99900035 HC TECH TIME PER 15 MIN (STAT)

## 2023-05-13 PROCEDURE — 85025 COMPLETE CBC W/AUTO DIFF WBC: CPT | Performed by: STUDENT IN AN ORGANIZED HEALTH CARE EDUCATION/TRAINING PROGRAM

## 2023-05-13 PROCEDURE — 27000221 HC OXYGEN, UP TO 24 HOURS

## 2023-05-13 PROCEDURE — 99233 SBSQ HOSP IP/OBS HIGH 50: CPT | Mod: ,,, | Performed by: INTERNAL MEDICINE

## 2023-05-13 PROCEDURE — A4216 STERILE WATER/SALINE, 10 ML: HCPCS | Performed by: INTERNAL MEDICINE

## 2023-05-13 PROCEDURE — 80048 BASIC METABOLIC PNL TOTAL CA: CPT | Mod: XB | Performed by: STUDENT IN AN ORGANIZED HEALTH CARE EDUCATION/TRAINING PROGRAM

## 2023-05-13 PROCEDURE — 99233 PR SUBSEQUENT HOSPITAL CARE,LEVL III: ICD-10-PCS | Mod: ,,, | Performed by: INTERNAL MEDICINE

## 2023-05-13 PROCEDURE — 20600001 HC STEP DOWN PRIVATE ROOM

## 2023-05-13 RX ORDER — DICLOFENAC SODIUM 10 MG/G
2 GEL TOPICAL DAILY PRN
Status: DISCONTINUED | OUTPATIENT
Start: 2023-05-13 | End: 2023-05-15 | Stop reason: HOSPADM

## 2023-05-13 RX ADMIN — Medication 10 ML: at 12:05

## 2023-05-13 RX ADMIN — BUMETANIDE 4 MG: 1 TABLET ORAL at 09:05

## 2023-05-13 RX ADMIN — SACUBITRIL AND VALSARTAN 1 TABLET: 49; 51 TABLET, FILM COATED ORAL at 09:05

## 2023-05-13 RX ADMIN — ESCITALOPRAM OXALATE 5 MG: 5 TABLET, FILM COATED ORAL at 09:05

## 2023-05-13 RX ADMIN — ATORVASTATIN CALCIUM 40 MG: 40 TABLET, FILM COATED ORAL at 08:05

## 2023-05-13 RX ADMIN — PANTOPRAZOLE SODIUM 40 MG: 40 TABLET, DELAYED RELEASE ORAL at 09:05

## 2023-05-13 RX ADMIN — AMIODARONE HYDROCHLORIDE 200 MG: 200 TABLET ORAL at 09:05

## 2023-05-13 RX ADMIN — Medication 10 ML: at 05:05

## 2023-05-13 RX ADMIN — HYDRALAZINE HYDROCHLORIDE 25 MG: 25 TABLET, FILM COATED ORAL at 03:05

## 2023-05-13 RX ADMIN — Medication 10 ML: at 06:05

## 2023-05-13 RX ADMIN — HYDRALAZINE HYDROCHLORIDE 25 MG: 25 TABLET, FILM COATED ORAL at 05:05

## 2023-05-13 RX ADMIN — ISOSORBIDE DINITRATE 30 MG: 20 TABLET ORAL at 09:05

## 2023-05-13 RX ADMIN — ISOSORBIDE DINITRATE 30 MG: 20 TABLET ORAL at 03:05

## 2023-05-13 RX ADMIN — APIXABAN 5 MG: 5 TABLET, FILM COATED ORAL at 09:05

## 2023-05-13 RX ADMIN — APIXABAN 5 MG: 5 TABLET, FILM COATED ORAL at 08:05

## 2023-05-13 RX ADMIN — DICLOFENAC SODIUM 2 G: 10 GEL TOPICAL at 04:05

## 2023-05-13 NOTE — PLAN OF CARE
Problem: Adult Inpatient Plan of Care  Goal: Plan of Care Review  Outcome: Ongoing, Progressing  Goal: Patient-Specific Goal (Individualized)  Outcome: Ongoing, Progressing  Goal: Absence of Hospital-Acquired Illness or Injury  Outcome: Ongoing, Progressing  Goal: Optimal Comfort and Wellbeing  Outcome: Ongoing, Progressing  Goal: Readiness for Transition of Care  Outcome: Ongoing, Progressing     Problem: Diabetes Comorbidity  Goal: Blood Glucose Level Within Targeted Range  Outcome: Ongoing, Progressing     Problem: Pain Acute  Goal: Acceptable Pain Control and Functional Ability  Outcome: Ongoing, Progressing     Problem: Infection  Goal: Absence of Infection Signs and Symptoms  Outcome: Ongoing, Progressing     Problem: Fluid and Electrolyte Imbalance (Acute Kidney Injury/Impairment)  Goal: Fluid and Electrolyte Balance  Outcome: Ongoing, Progressing     Problem: Oral Intake Inadequate (Acute Kidney Injury/Impairment)  Goal: Optimal Nutrition Intake  Outcome: Ongoing, Progressing     Problem: Renal Function Impairment (Acute Kidney Injury/Impairment)  Goal: Effective Renal Function  Outcome: Ongoing, Progressing

## 2023-05-13 NOTE — PROGRESS NOTES
"[facility] - Cardiology Stepdown  Heart Transplant  Progress Note    Patient Name: Hafsa Hawley  MRN: [id number]  Admission Date: 5/11/2023  Hospital Length of Stay: 2 days  Attending Physician: eBrtha Lorenzo MD  Primary Care Provider: Primary Doctor No  Principal Problem:Acute on chronic combined systolic and diastolic heart failure    Subjective:     Interval History: NAEON. No acute complaints.     CVP 6  SVO2 49  CO/CI 4.29/2.17  SVR 1454    Continuous Infusions:   DOBUTamine IV infusion (non-titrating) 2.5 mcg/kg/min (05/12/23 1651)     Scheduled Meds:   amiodarone  200 mg Oral Daily    apixaban  5 mg Oral BID    atorvastatin  40 mg Oral QHS    EScitalopram oxalate  5 mg Oral Daily    hydrALAZINE  25 mg Oral Q8H    isosorbide dinitrate  30 mg Oral TID    pantoprazole  40 mg Oral Daily    sacubitriL-valsartan  1 tablet Oral BID    sodium chloride 0.9%  10 mL Intravenous Q6H     PRN Meds:dextrose 10%, dextrose 10%, dextrose, dextrose, diclofenac sodium, glucagon (human recombinant), insulin aspart U-100, sodium chloride 0.9%, Flushing PICC Protocol **AND** sodium chloride 0.9% **AND** sodium chloride 0.9%    Review of patient's allergies indicates:   Allergen Reactions    Penicillins Hives and Other (See Comments)    Iodinated contrast media Nausea And Vomiting    Oxycodone-acetaminophen Other (See Comments)     Nausea, Dizziness, Anxiety.  "I don't like how it makes me feel."   Given Hydromorphone 0.5mg IVP  Without problems.  Other reaction(s): Other (See Comments)    Clonazepam Other (See Comments)    Diovan hct [valsartan-hydrochlorothiazide] Other (See Comments)     Causes coughing    Iodine Other (See Comments)    Irinotecan      Pt has homozygosity for the TA7 promoter variant that places this individual at significantly increased risk for   severe neutropenia (grade 4) when treated with the standard dose of irinotecan (risk approximately 50%).   Other drugs that have been demonstrated " to be impacted by homozygosity for the UGT1A1 TA7 promoter variant include pazopanib, nilotinib, atazanavir, and belinostat. Metabolism of other drugs not listed here may also be impacted by UGT1A1 enzyme activity.       Tramadol Nausea And Vomiting and Other (See Comments)     Other reaction(s): Other (See Comments)    Valsartan Other (See Comments)     Objective:     Vital Signs (Most Recent):  Temp: 97.5 °F (36.4 °C) (05/13/23 1058)  Pulse: 75 (05/13/23 1118)  Resp: 18 (05/13/23 1058)  BP: 107/71 (05/13/23 1058)  SpO2: 96 % (05/13/23 1058) Vital Signs (24h Range):  Temp:  [97.5 °F (36.4 °C)-98 °F (36.7 °C)] 97.5 °F (36.4 °C)  Pulse:  [70-84] 75  Resp:  [18] 18  SpO2:  [95 %-98 %] 96 %  BP: (106-133)/(57-85) 107/71     Patient Vitals for the past 72 hrs (Last 3 readings):   Weight   05/13/23 0538 83.6 kg (184 lb 4.9 oz)   05/12/23 0400 84.4 kg (186 lb 1.1 oz)   05/11/23 2104 85.3 kg (188 lb 0.8 oz)       Body mass index is 29.75 kg/m².      Intake/Output Summary (Last 24 hours) at 5/13/2023 1338  Last data filed at 5/13/2023 0555  Gross per 24 hour   Intake 1180 ml   Output 4000 ml   Net -2820 ml         Hemodynamic Parameters:  CVP:  [6 mmHg] 6 mmHg         Physical Exam  Constitutional:       Appearance: She is obese.      Comments: Alert, Oriented, No acute distress   HENT:      Head: Normocephalic and atraumatic.   Eyes:      Conjunctiva/sclera: Conjunctivae normal.   Neck:      Vascular: No JVD.   Cardiovascular:      Rate and Rhythm: Normal rate and regular rhythm.   Pulmonary:      Comments: Normal effort, Clear to auscultation   Abdominal:      Comments: Soft, Non-tender, Non-distended   Musculoskeletal:      Cervical back: Normal range of motion.      Comments: No peripheral edema   Skin:     Comments: Dry, Intact, Warm, Capillary refill <2 seconds.    Neurological:      Mental Status: She is alert and oriented to person, place, and time.      Comments: Normal speech   Psychiatric:         Mood and  Affect: Mood and affect normal.          Significant Labs:  CBC:  Recent Labs   Lab 05/11/23  1735 05/12/23  0552 05/13/23  0504   WBC 3.59* 3.32* 3.42*   RBC 6.08* 5.78* 5.85*   HGB 10.7* 9.9* 10.6*   HCT 37.2 36.2* 37.3    180 154   MCV 61* 63* 64*   MCH 17.6* 17.1* 18.1*   MCHC 28.8* 27.3* 28.4*       BNP:  Recent Labs   Lab 05/11/23  1735   BNP 3,087*       CMP:  Recent Labs   Lab 05/11/23  1735 05/12/23  0552 05/12/23  1808 05/13/23  0504   * 92 160* 80   CALCIUM 9.2 9.2 9.0 9.0   ALBUMIN 3.7 3.3*  --  3.4*   PROT 6.6 5.9*  --  6.3    140 140 143   K 3.8 3.7 4.0 4.4   CO2 26 25 24 26    105 103 104   BUN 60* 56* 53* 53*   CREATININE 2.2* 1.8* 2.0* 2.1*   ALKPHOS 59 51*  --  52*   ALT 20 17  --  16   AST 20 21  --  26   BILITOT 1.7* 1.5*  --  1.6*        Coagulation:   No results for input(s): PT, INR, APTT in the last 168 hours.  LDH:  No results for input(s): LDH in the last 72 hours.  Microbiology:  Microbiology Results (last 7 days)       ** No results found for the last 168 hours. **            I have reviewed all pertinent labs within the past 24 hours.    Estimated Creatinine Clearance: 32.2 mL/min (A) (based on SCr of 2.1 mg/dL (H)).    Diagnostic Results:  I have reviewed all pertinent imaging results/findings within the past 24 hours.    Assessment and Plan:     57 year old female with NICM (since 2013), Pulmonary hypertension, DM, HTN, permanent AF and ICD, who presented today after she was referred from clinic to be admitted for palliative dobutamine. Patient was previously evaluated and presented to committee on 3/9/22.  At the time she was declined for OHT or LVAD due to renal function, lung function and difficulty consistently following up with the team as well as poor medical adherence. Patient was seen yesterday in clinic by Dr. Orozco. Concerns regarding medication non-compliance and/or misunderstanding was raised during the visit. Patients renal function has  deteriorated since her last discharge. She reports being un Entresto  mg, BID; however, it was noted that her Entresto was reduced during that hospital course. Today patient reports occasional shortness of breath on exertion but this has been unchanged since her last discharge. Patient endorses 2 pillow orthopnea, has not required any more lately and does report frequent PND. Patient denies any fever, chills, nausea, vomiting, hematemesis, cough, chest pain, palpitations, shortness of breath, abdominal pain, changes in bowel or urinary habits, no hematochezia or melena.      * Acute on chronic combined systolic and diastolic heart failure  - NICM  - Last RHC (3/29/2023): RA 9, PCWP 16, PA 58/29/39, PVR 5.6 BOLDEN, CO/CI 4.1/2.03, SVR 1151, Jayson 3  - Last Echo (3/27/2023): EF 15%. LV severely enlarged w/ eccentric hypertrophy and severely decreased systolic function. Grade 2 LV diastolic dysfunction. Normal RV size and moderately reduced systolic function. Biatrial enlargment. Mild MR. Small pericardial effusion. PA sys pressure 51mmHg.   - Home GDMT: Jardiance, Hydralaine 25mg tid, Isordil 30mg tid, Entresto 46mg bid, and toprolol 100mg daily.   - Home diuretic regimen: Bumex 4mg bid  - Euvolemic on exam today. Continue home GDMT and but stop diuretic regimen.    - Ionotropes: Continue  2.5 for palliative purposes.   - Palliative care consulted.   - Strict I&Os and daily weights.   - Maintain K>4 and Mg> 2.     CKD (chronic kidney disease), stage IV  - Cr. 2.6 yesterday down to 2.2 today  - Baseline renal function appears to be around 1.9-2.2  - Worsening renal function may have been in the setting of high dose Entresto  - Monitor renal function  - Avoid Nephrotoxic agents  - Monitor urine output     Restrictive lung disease  - PFTs 03/15/2023 demonstrates severe restriction and severely reduced MVV there is also the possibility of mild obstruction per report.  - Continue with oxygen supplementation as  needed    WASHINGTON (acute kidney injury)  WASHINGTON on CKD stage 4 intially most likely secondary to hypervolemia. Baseline Cr around 1.5 (5/2/2023). Today worsened possibly due to overdiuresis.     - will hold diuretic regimen.   - Avoid nephrotoxic agents.  - Will continue to monitor.     Type 2 diabetes mellitus with stage 4 chronic kidney disease, with long-term current use of insulin  - Most recent A1c: 5.9  - Hold Jardiance (Also given CrCl)  - FS + LSS        Angelita Downing MD  Heart Transplant  [facility] - Cardiology [facility]

## 2023-05-13 NOTE — PROGRESS NOTES
Telemetry reported 7 beats of VT,  patient non symptomatic, Also asking for lidocaine patch for arthritidis pain, MD notified.

## 2023-05-13 NOTE — ASSESSMENT & PLAN NOTE
[place] on CKD stage 4 intially most likely secondary to hypervolemia. Baseline Cr around 1.5 (5/2/2023). Today worsened possibly due to overdiuresis.     - will hold diuretic regimen.   - Avoid nephrotoxic agents.  - Will continue to monitor.

## 2023-05-13 NOTE — SUBJECTIVE & OBJECTIVE
"Interval History: NAEON. No acute complaints.     CVP 6  SVO2 49  CO/CI 4.29/2.17  SVR 1454    Continuous Infusions:   DOBUTamine IV infusion (non-titrating) 2.5 mcg/kg/min (05/12/23 1651)     Scheduled Meds:   amiodarone  200 mg Oral Daily    apixaban  5 mg Oral BID    atorvastatin  40 mg Oral QHS    EScitalopram oxalate  5 mg Oral Daily    hydrALAZINE  25 mg Oral Q8H    isosorbide dinitrate  30 mg Oral TID    pantoprazole  40 mg Oral Daily    sacubitriL-valsartan  1 tablet Oral BID    sodium chloride 0.9%  10 mL Intravenous Q6H     PRN Meds:dextrose 10%, dextrose 10%, dextrose, dextrose, diclofenac sodium, glucagon (human recombinant), insulin aspart U-100, sodium chloride 0.9%, Flushing PICC Protocol **AND** sodium chloride 0.9% **AND** sodium chloride 0.9%    Review of patient's allergies indicates:   Allergen Reactions    Penicillins Hives and Other (See Comments)    Iodinated contrast media Nausea And Vomiting    Oxycodone-acetaminophen Other (See Comments)     Nausea, Dizziness, Anxiety.  "I don't like how it makes me feel."   Given Hydromorphone 0.5mg IVP  Without problems.  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 Metabolism of other drugs not listed here may also be impacted by UGT1A1 enzyme activity.       Tramadol Nausea And Vomiting and Other (See Comments)     Other reaction(s): Other (See Comments)    Valsartan Other (See Comments)     Objective: "     Vital Signs (Most Recent):  Temp: 97.5 °F (36.4 °C) (05/13/23 1058)  Pulse: 75 (05/13/23 1118)  Resp: 18 (05/13/23 1058)  BP: 107/71 (05/13/23 1058)  SpO2: 96 % (05/13/23 1058) Vital Signs (24h Range):  Temp:  [97.5 °F (36.4 °C)-98 °F (36.7 °C)] 97.5 °F (36.4 °C)  Pulse:  [70-84] 75  Resp:  [18] 18  SpO2:  [95 %-98 %] 96 %  BP: (106-133)/(57-85) 107/71     Patient Vitals for the past 72 hrs (Last 3 readings):   Weight   05/13/23 0538 83.6 kg (184 lb 4.9 oz)   05/12/23 0400 84.4 kg (186 lb 1.1 oz)   05/11/23 2104 85.3 kg (188 lb 0.8 oz)       Body mass index is 29.75 kg/m².      Intake/Output Summary (Last 24 hours) at 5/13/2023 1338  Last data filed at 5/13/2023 0555  Gross per 24 hour   Intake 1180 ml   Output 4000 ml   Net -2820 ml         Hemodynamic Parameters:  CVP:  [6 mmHg] 6 mmHg         Physical Exam  Constitutional:       Appearance: She is obese.      Comments: Alert, Oriented, No acute distress   HENT:      Head: Normocephalic and atraumatic.   Eyes:      Conjunctiva/sclera: Conjunctivae normal.   Neck:      Vascular: No JVD.   Cardiovascular:      Rate and Rhythm: Normal rate and regular rhythm.   Pulmonary:      Comments: Normal effort, Clear to auscultation   Abdominal:      Comments: Soft, Non-tender, Non-distended   Musculoskeletal:      Cervical back: Normal range of motion.      Comments: No peripheral edema   Skin:     Comments: Dry, Intact, Warm, Capillary refill <2 seconds.    Neurological:      Mental Status: She is alert and oriented to person, place, and time.      Comments: Normal speech   Psychiatric:         Mood and Affect: Mood and affect normal.          Significant Labs:  CBC:  Recent Labs   Lab 05/11/23  1735 05/12/23  0552 05/13/23  0504   WBC 3.59* 3.32* 3.42*   RBC 6.08* 5.78* 5.85*   HGB 10.7* 9.9* 10.6*   HCT 37.2 36.2* 37.3    180 154   MCV 61* 63* 64*   MCH 17.6* 17.1* 18.1*   MCHC 28.8* 27.3* 28.4*       BNP:  Recent Labs   Lab 05/11/23  1735   BNP 3,087*        CMP:  Recent Labs   Lab 05/11/23  1735 05/12/23  0552 05/12/23  1808 05/13/23  0504   * 92 160* 80   CALCIUM 9.2 9.2 9.0 9.0   ALBUMIN 3.7 3.3*  --  3.4*   PROT 6.6 5.9*  --  6.3    140 140 143   K 3.8 3.7 4.0 4.4   CO2 26 25 24 26    105 103 104   BUN 60* 56* 53* 53*   CREATININE 2.2* 1.8* 2.0* 2.1*   ALKPHOS 59 51*  --  52*   ALT 20 17  --  16   AST 20 21  --  26   BILITOT 1.7* 1.5*  --  1.6*        Coagulation:   No results for input(s): PT, INR, APTT in the last 168 hours.  LDH:  No results for input(s): LDH in the last 72 hours.  Microbiology:  Microbiology Results (last 7 days)       ** No results found for the last 168 hours. **            I have reviewed all pertinent labs within the past 24 hours.    Estimated Creatinine Clearance: 32.2 mL/min (A) (based on SCr of 2.1 mg/dL (H)).    Diagnostic Results:  I have reviewed all pertinent imaging results/findings within the past 24 hours.

## 2023-05-13 NOTE — ASSESSMENT & PLAN NOTE
- NICM  - Last RHC (3/29/2023): RA 9, PCWP 16, PA 58/29/39, PVR 5.6 BOLDEN, CO/CI 4.1/2.03, SVR 1151, Jayson 3  - Last Echo (3/27/2023): EF 15%. LV severely enlarged w/ eccentric hypertrophy and severely decreased systolic function. Grade 2 LV diastolic dysfunction. Normal RV size and moderately reduced systolic function. Biatrial enlargment. Mild MR. Small pericardial effusion. PA sys pressure 51mmHg.   - Home GDMT: Jardiance, Hydralaine 25mg tid, Isordil 30mg tid, Entresto 46mg bid, and toprolol 100mg daily.   - Home diuretic regimen: Bumex 4mg bid  - Euvolemic on exam today. Continue home GDMT and but stop diuretic regimen.    - Ionotropes: Continue  2.5 for palliative purposes.   - Palliative care consulted.   - Strict I&Os and daily weights.   - Maintain K>4 and Mg> 2.

## 2023-05-14 LAB
ALBUMIN SERPL BCP-MCNC: 3.3 G/DL (ref 3.5–5.2)
ALP SERPL-CCNC: 55 U/L (ref 55–135)
ALT SERPL W/O P-5'-P-CCNC: 15 U/L (ref 10–44)
ANION GAP SERPL CALC-SCNC: 11 MMOL/L (ref 8–16)
ANION GAP SERPL CALC-SCNC: 9 MMOL/L (ref 8–16)
ANISOCYTOSIS BLD QL SMEAR: ABNORMAL
AST SERPL-CCNC: 19 U/L (ref 10–40)
BASOPHILS # BLD AUTO: 0.02 K/UL (ref 0–0.2)
BASOPHILS NFR BLD: 0.5 % (ref 0–1.9)
BILIRUB SERPL-MCNC: 1.6 MG/DL (ref 0.1–1)
BUN SERPL-MCNC: 49 MG/DL (ref 6–20)
BUN SERPL-MCNC: 52 MG/DL (ref 6–20)
CALCIUM SERPL-MCNC: 9.2 MG/DL (ref 8.7–10.5)
CALCIUM SERPL-MCNC: 9.5 MG/DL (ref 8.7–10.5)
CHLORIDE SERPL-SCNC: 104 MMOL/L (ref 95–110)
CHLORIDE SERPL-SCNC: 106 MMOL/L (ref 95–110)
CO2 SERPL-SCNC: 24 MMOL/L (ref 23–29)
CO2 SERPL-SCNC: 27 MMOL/L (ref 23–29)
CREAT SERPL-MCNC: 1.8 MG/DL (ref 0.5–1.4)
CREAT SERPL-MCNC: 2 MG/DL (ref 0.5–1.4)
DACRYOCYTES BLD QL SMEAR: ABNORMAL
DIFFERENTIAL METHOD: ABNORMAL
EOSINOPHIL # BLD AUTO: 0.1 K/UL (ref 0–0.5)
EOSINOPHIL NFR BLD: 2.4 % (ref 0–8)
ERYTHROCYTE [DISTWIDTH] IN BLOOD BY AUTOMATED COUNT: 29.1 % (ref 11.5–14.5)
EST. GFR  (NO RACE VARIABLE): 28.6 ML/MIN/1.73 M^2
EST. GFR  (NO RACE VARIABLE): 32.5 ML/MIN/1.73 M^2
GLUCOSE SERPL-MCNC: 142 MG/DL (ref 70–110)
GLUCOSE SERPL-MCNC: 97 MG/DL (ref 70–110)
HCT VFR BLD AUTO: 36.3 % (ref 37–48.5)
HGB BLD-MCNC: 10.6 G/DL (ref 12–16)
HYPOCHROMIA BLD QL SMEAR: ABNORMAL
IMM GRANULOCYTES # BLD AUTO: 0.02 K/UL (ref 0–0.04)
IMM GRANULOCYTES NFR BLD AUTO: 0.5 % (ref 0–0.5)
LYMPHOCYTES # BLD AUTO: 1.1 K/UL (ref 1–4.8)
LYMPHOCYTES NFR BLD: 28.3 % (ref 18–48)
MAGNESIUM SERPL-MCNC: 1.9 MG/DL (ref 1.6–2.6)
MCH RBC QN AUTO: 17.8 PG (ref 27–31)
MCHC RBC AUTO-ENTMCNC: 29.2 G/DL (ref 32–36)
MCV RBC AUTO: 61 FL (ref 82–98)
MONOCYTES # BLD AUTO: 0.5 K/UL (ref 0.3–1)
MONOCYTES NFR BLD: 12.1 % (ref 4–15)
NEUTROPHILS # BLD AUTO: 2.1 K/UL (ref 1.8–7.7)
NEUTROPHILS NFR BLD: 56.2 % (ref 38–73)
NRBC BLD-RTO: 0 /100 WBC
OVALOCYTES BLD QL SMEAR: ABNORMAL
PLATELET # BLD AUTO: 156 K/UL (ref 150–450)
PMV BLD AUTO: ABNORMAL FL (ref 9.2–12.9)
POCT GLUCOSE: 103 MG/DL (ref 70–110)
POCT GLUCOSE: 130 MG/DL (ref 70–110)
POIKILOCYTOSIS BLD QL SMEAR: ABNORMAL
POLYCHROMASIA BLD QL SMEAR: ABNORMAL
POTASSIUM SERPL-SCNC: 3.5 MMOL/L (ref 3.5–5.1)
POTASSIUM SERPL-SCNC: 4.7 MMOL/L (ref 3.5–5.1)
PROT SERPL-MCNC: 5.9 G/DL (ref 6–8.4)
RBC # BLD AUTO: 5.95 M/UL (ref 4–5.4)
SCHISTOCYTES BLD QL SMEAR: ABNORMAL
SODIUM SERPL-SCNC: 139 MMOL/L (ref 136–145)
SODIUM SERPL-SCNC: 142 MMOL/L (ref 136–145)
SPHEROCYTES BLD QL SMEAR: ABNORMAL
TARGETS BLD QL SMEAR: ABNORMAL
WBC # BLD AUTO: 3.71 K/UL (ref 3.9–12.7)

## 2023-05-14 PROCEDURE — 25000003 PHARM REV CODE 250: Performed by: STUDENT IN AN ORGANIZED HEALTH CARE EDUCATION/TRAINING PROGRAM

## 2023-05-14 PROCEDURE — A4216 STERILE WATER/SALINE, 10 ML: HCPCS | Performed by: INTERNAL MEDICINE

## 2023-05-14 PROCEDURE — 63600175 PHARM REV CODE 636 W HCPCS: Performed by: STUDENT IN AN ORGANIZED HEALTH CARE EDUCATION/TRAINING PROGRAM

## 2023-05-14 PROCEDURE — 83735 ASSAY OF MAGNESIUM: CPT | Performed by: STUDENT IN AN ORGANIZED HEALTH CARE EDUCATION/TRAINING PROGRAM

## 2023-05-14 PROCEDURE — 80048 BASIC METABOLIC PNL TOTAL CA: CPT | Mod: XB | Performed by: STUDENT IN AN ORGANIZED HEALTH CARE EDUCATION/TRAINING PROGRAM

## 2023-05-14 PROCEDURE — 99233 SBSQ HOSP IP/OBS HIGH 50: CPT | Mod: ,,, | Performed by: INTERNAL MEDICINE

## 2023-05-14 PROCEDURE — 20600001 HC STEP DOWN PRIVATE ROOM

## 2023-05-14 PROCEDURE — 25000003 PHARM REV CODE 250: Performed by: INTERNAL MEDICINE

## 2023-05-14 PROCEDURE — 80053 COMPREHEN METABOLIC PANEL: CPT | Performed by: STUDENT IN AN ORGANIZED HEALTH CARE EDUCATION/TRAINING PROGRAM

## 2023-05-14 PROCEDURE — 85025 COMPLETE CBC W/AUTO DIFF WBC: CPT | Performed by: STUDENT IN AN ORGANIZED HEALTH CARE EDUCATION/TRAINING PROGRAM

## 2023-05-14 PROCEDURE — 99233 PR SUBSEQUENT HOSPITAL CARE,LEVL III: ICD-10-PCS | Mod: ,,, | Performed by: INTERNAL MEDICINE

## 2023-05-14 PROCEDURE — 36415 COLL VENOUS BLD VENIPUNCTURE: CPT | Performed by: STUDENT IN AN ORGANIZED HEALTH CARE EDUCATION/TRAINING PROGRAM

## 2023-05-14 RX ORDER — MAGNESIUM SULFATE HEPTAHYDRATE 40 MG/ML
2 INJECTION, SOLUTION INTRAVENOUS ONCE
Status: COMPLETED | OUTPATIENT
Start: 2023-05-14 | End: 2023-05-14

## 2023-05-14 RX ORDER — POTASSIUM CHLORIDE 20 MEQ/1
40 TABLET, EXTENDED RELEASE ORAL
Status: COMPLETED | OUTPATIENT
Start: 2023-05-14 | End: 2023-05-14

## 2023-05-14 RX ORDER — MUPIROCIN 20 MG/G
OINTMENT TOPICAL 2 TIMES DAILY
Status: DISCONTINUED | OUTPATIENT
Start: 2023-05-14 | End: 2023-05-15 | Stop reason: HOSPADM

## 2023-05-14 RX ORDER — ISOSORBIDE DINITRATE 10 MG/1
10 TABLET ORAL 3 TIMES DAILY
Status: DISCONTINUED | OUTPATIENT
Start: 2023-05-14 | End: 2023-05-15 | Stop reason: HOSPADM

## 2023-05-14 RX ADMIN — Medication 10 ML: at 12:05

## 2023-05-14 RX ADMIN — ISOSORBIDE DINITRATE 10 MG: 20 TABLET ORAL at 04:05

## 2023-05-14 RX ADMIN — POTASSIUM CHLORIDE 40 MEQ: 1500 TABLET, EXTENDED RELEASE ORAL at 08:05

## 2023-05-14 RX ADMIN — APIXABAN 5 MG: 5 TABLET, FILM COATED ORAL at 08:05

## 2023-05-14 RX ADMIN — PANTOPRAZOLE SODIUM 40 MG: 40 TABLET, DELAYED RELEASE ORAL at 08:05

## 2023-05-14 RX ADMIN — HYDRALAZINE HYDROCHLORIDE 25 MG: 25 TABLET, FILM COATED ORAL at 05:05

## 2023-05-14 RX ADMIN — ISOSORBIDE DINITRATE: 20 TABLET ORAL at 08:05

## 2023-05-14 RX ADMIN — SACUBITRIL AND VALSARTAN 1 TABLET: 49; 51 TABLET, FILM COATED ORAL at 08:05

## 2023-05-14 RX ADMIN — ISOSORBIDE DINITRATE 30 MG: 20 TABLET ORAL at 08:05

## 2023-05-14 RX ADMIN — ESCITALOPRAM OXALATE 5 MG: 5 TABLET, FILM COATED ORAL at 08:05

## 2023-05-14 RX ADMIN — ISOSORBIDE DINITRATE: 20 TABLET ORAL at 04:05

## 2023-05-14 RX ADMIN — MUPIROCIN: 20 OINTMENT TOPICAL at 09:05

## 2023-05-14 RX ADMIN — AMIODARONE HYDROCHLORIDE 200 MG: 200 TABLET ORAL at 08:05

## 2023-05-14 RX ADMIN — ISOSORBIDE DINITRATE 10 MG: 20 TABLET ORAL at 08:05

## 2023-05-14 RX ADMIN — MAGNESIUM SULFATE 2 G: 2 INJECTION INTRAVENOUS at 08:05

## 2023-05-14 RX ADMIN — Medication 10 ML: at 06:05

## 2023-05-14 RX ADMIN — ATORVASTATIN CALCIUM 40 MG: 40 TABLET, FILM COATED ORAL at 08:05

## 2023-05-14 RX ADMIN — MUPIROCIN: 20 OINTMENT TOPICAL at 08:05

## 2023-05-14 NOTE — ASSESSMENT & PLAN NOTE
- NICM  - Last RHC (3/29/2023): RA 9, PCWP 16, PA 58/29/39, PVR 5.6 BOLDEN, CO/CI 4.1/2.03, SVR 1151, Jayson 3  - Last Echo (3/27/2023): EF 15%. LV severely enlarged w/ eccentric hypertrophy and severely decreased systolic function. Grade 2 LV diastolic dysfunction. Normal RV size and moderately reduced systolic function. Biatrial enlargment. Mild MR. Small pericardial effusion. PA sys pressure 51mmHg.   - Home GDMT: Jardiance, Hydralaine 25mg tid, Isordil 30mg tid, Entresto 46mg bid, and toprolol 100mg daily.   - Home diuretic regimen: Bumex 4mg bid  - Euvolemic on exam today. Continue to hold diuretics. Will increase hydralazine to 37.5 tid and continue isordil 30mg tid and entresto 46/51bid.   - Ionotropes: Continue  2.5 for palliative purposes.   - Palliative care consulted.   - Strict I&Os and daily weights.   - Maintain K>4 and Mg> 2.

## 2023-05-14 NOTE — ASSESSMENT & PLAN NOTE
[place] on CKD stage 4 intially most likely secondary to hypervolemia. Baseline Cr around 1.5 (5/2/2023). Improving to 1.8 today while holding diuretics.     - will hold diuretic regimen.   - Avoid nephrotoxic agents.  - Will continue to monitor.

## 2023-05-14 NOTE — SUBJECTIVE & OBJECTIVE
"Interval History: NAEON. No acute complaints.       Continuous Infusions:   DOBUTamine IV infusion (non-titrating) 2.5 mcg/kg/min (05/12/23 6601)     Scheduled Meds:   amiodarone  200 mg Oral Daily    apixaban  5 mg Oral BID    atorvastatin  40 mg Oral QHS    EScitalopram oxalate  5 mg Oral Daily    hydrALAZINE-hydrALAZINE-isorsorbide dinitrate (BIDIL 20/37.5) combination 1 tablet   Oral TID    isosorbide dinitrate  10 mg Oral TID    mupirocin   Nasal BID    pantoprazole  40 mg Oral Daily    sacubitriL-valsartan  1 tablet Oral BID    sodium chloride 0.9%  10 mL Intravenous Q6H     PRN Meds:dextrose 10%, dextrose 10%, dextrose, dextrose, diclofenac sodium, glucagon (human recombinant), insulin aspart U-100, sodium chloride 0.9%, Flushing PICC Protocol **AND** sodium chloride 0.9% **AND** sodium chloride 0.9%    Review of patient's allergies indicates:   Allergen Reactions    Penicillins Hives and Other (See Comments)    Iodinated contrast media Nausea And Vomiting    Oxycodone-acetaminophen Other (See Comments)     Nausea, Dizziness, Anxiety.  "I don't like how it makes me feel."   Given Hydromorphone 0.5mg IVP  Without problems.  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 Metabolism of other drugs not listed here may also be impacted by UGT1A1 enzyme activity.       Tramadol Nausea And Vomiting and Other (See Comments)     Other reaction(s): Other (See Comments)    " Valsartan Other (See Comments)     Objective:     Vital Signs (Most Recent):  Temp: 97.7 °F (36.5 °C) (05/14/23 1134)  Pulse: 92 (05/14/23 1134)  Resp: 16 (05/14/23 1134)  BP: 124/75 (05/14/23 1134)  SpO2: (!) 92 % (05/14/23 1134) Vital Signs (24h Range):  Temp:  [97.2 °F (36.2 °C)-98.5 °F (36.9 °C)] 97.7 °F (36.5 °C)  Pulse:  [] 92  Resp:  [16-18] 16  SpO2:  [92 %-98 %] 92 %  BP: ()/(65-82) 124/75     Patient Vitals for the past 72 hrs (Last 3 readings):   Weight   05/14/23 0440 83.7 kg (184 lb 8.4 oz)   05/13/23 0538 83.6 kg (184 lb 4.9 oz)   05/12/23 0400 84.4 kg (186 lb 1.1 oz)       Body mass index is 29.78 kg/m².      Intake/Output Summary (Last 24 hours) at 5/14/2023 1300  Last data filed at 5/14/2023 0810  Gross per 24 hour   Intake 537.07 ml   Output 900 ml   Net -362.93 ml         Hemodynamic Parameters:  CVP:  [7 mmHg] 7 mmHg         Physical Exam  Constitutional:       Appearance: She is obese.      Comments: Alert, Oriented, No acute distress   HENT:      Head: Normocephalic and atraumatic.   Eyes:      Conjunctiva/sclera: Conjunctivae normal.   Neck:      Comments: JVP 8cm of H20 at 45 degrees.   Cardiovascular:      Rate and Rhythm: Normal rate and regular rhythm.   Pulmonary:      Comments: Normal effort, Clear to auscultation   Abdominal:      Comments: Soft, Non-tender, Non-distended   Musculoskeletal:      Cervical back: Normal range of motion.      Comments: No peripheral edema   Skin:     Comments: Dry, Intact, Warm, Capillary refill <2 seconds.    Neurological:      Mental Status: She is alert and oriented to person, place, and time.      Comments: Normal speech   Psychiatric:         Mood and Affect: Mood and affect normal.          Significant Labs:  CBC:  Recent Labs   Lab 05/12/23  0552 05/13/23  0504 05/14/23  0436   WBC 3.32* 3.42* 3.71*   RBC 5.78* 5.85* 5.95*   HGB 9.9* 10.6* 10.6*   HCT 36.2* 37.3 36.3*    154 156   MCV 63* 64* 61*   MCH 17.1* 18.1* 17.8*   MCHC  27.3* 28.4* 29.2*       BNP:  Recent Labs   Lab 05/11/23  1735   BNP 3,087*       CMP:  Recent Labs   Lab 05/12/23  0552 05/12/23  1808 05/13/23  0504 05/13/23  1743 05/14/23  0436   GLU 92   < > 80 126* 97   CALCIUM 9.2   < > 9.0 9.3 9.2   ALBUMIN 3.3*  --  3.4*  --  3.3*   PROT 5.9*  --  6.3  --  5.9*      < > 143 141 142   K 3.7   < > 4.4 3.9 3.5   CO2 25   < > 26 24 27      < > 104 104 104   BUN 56*   < > 53* 51* 49*   CREATININE 1.8*   < > 2.1* 1.9* 1.8*   ALKPHOS 51*  --  52*  --  55   ALT 17  --  16  --  15   AST 21  --  26  --  19   BILITOT 1.5*  --  1.6*  --  1.6*    < > = values in this interval not displayed.        Coagulation:   No results for input(s): PT, INR, APTT in the last 168 hours.  LDH:  No results for input(s): LDH in the last 72 hours.  Microbiology:  Microbiology Results (last 7 days)       ** No results found for the last 168 hours. **            I have reviewed all pertinent labs within the past 24 hours.    Estimated Creatinine Clearance: 37.6 mL/min (A) (based on SCr of 1.8 mg/dL (H)).    Diagnostic Results:  I have reviewed all pertinent imaging results/findings within the past 24 hours.

## 2023-05-14 NOTE — CONSULTS
Palliative care consult received for:    Hafsa Hawley who is a 57-year-old woman with a history of NICM (EF 15%, diagnosed in 2013) s/p ICD, pulmonary HTN, atrial fibrillation, DM, CKD4 who was admitted from clinic for volume overload. Unfortunately she is not a candidate for advanced therapies due to declining renal function, pulmonary function and nonadherence. She remains hospitalized for planss to discharge with palliative dobutamine. Palliative and Supportive Care was consulted to explore goals of care in setting of poor prognosis.    Thank you for this consult. We will meet Ms. Hawley tomorrow 5/15/23 to explore her understanding of prognosis, which will guide clarification of goals of care. Full palliative care consult note to follow.

## 2023-05-14 NOTE — PROGRESS NOTES
"[facility]  Heart Transplant  Progress Note    Patient Name: Hafsa Hawley  MRN: [id number]  Admission Date: 5/11/2023  Hospital Length of Stay: 3 days  Attending Physician: Bertha Lorenzo MD  Primary Care Provider: Primary Doctor No  Principal Problem:Acute on chronic combined systolic and diastolic heart failure    Subjective:     Interval History: NAEON. No acute complaints.       Continuous Infusions:   DOBUTamine IV infusion (non-titrating) 2.5 mcg/kg/min (05/12/23 1651)     Scheduled Meds:   amiodarone  200 mg Oral Daily    apixaban  5 mg Oral BID    atorvastatin  40 mg Oral QHS    EScitalopram oxalate  5 mg Oral Daily    hydrALAZINE-hydrALAZINE-isorsorbide dinitrate (BIDIL 20/37.5) combination 1 tablet   Oral TID    isosorbide dinitrate  10 mg Oral TID    mupirocin   Nasal BID    pantoprazole  40 mg Oral Daily    sacubitriL-valsartan  1 tablet Oral BID    sodium chloride 0.9%  10 mL Intravenous Q6H     PRN Meds:dextrose 10%, dextrose 10%, dextrose, dextrose, diclofenac sodium, glucagon (human recombinant), insulin aspart U-100, sodium chloride 0.9%, Flushing PICC Protocol **AND** sodium chloride 0.9% **AND** sodium chloride 0.9%    Review of patient's allergies indicates:   Allergen Reactions    Penicillins Hives and Other (See Comments)    Iodinated contrast media Nausea And Vomiting    Oxycodone-acetaminophen Other (See Comments)     Nausea, Dizziness, Anxiety.  "I don't like how it makes me feel."   Given Hydromorphone 0.5mg IVP  Without problems.  Other reaction(s): Other (See Comments)    Clonazepam Other (See Comments)    Diovan hct [valsartan-hydrochlorothiazide] Other (See Comments)     Causes coughing    Iodine Other (See Comments)    Irinotecan      Pt has homozygosity for the TA7 promoter variant that places this individual at significantly increased risk for   severe neutropenia (grade 4) when treated with the standard dose of irinotecan (risk approximately " 50%).   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 Metabolism of other drugs not listed here may also be impacted by UGT1A1 enzyme activity.       Tramadol Nausea And Vomiting and Other (See Comments)     Other reaction(s): Other (See Comments)    Valsartan Other (See Comments)     Objective:     Vital Signs (Most Recent):  Temp: 97.7 °F (36.5 °C) (05/14/23 1134)  Pulse: 92 (05/14/23 1134)  Resp: 16 (05/14/23 1134)  BP: 124/75 (05/14/23 1134)  SpO2: (!) 92 % (05/14/23 1134) Vital Signs (24h Range):  Temp:  [97.2 °F (36.2 °C)-98.5 °F (36.9 °C)] 97.7 °F (36.5 °C)  Pulse:  [] 92  Resp:  [16-18] 16  SpO2:  [92 %-98 %] 92 %  BP: ()/(65-82) 124/75     Patient Vitals for the past 72 hrs (Last 3 readings):   Weight   05/14/23 0440 83.7 kg (184 lb 8.4 oz)   05/13/23 0538 83.6 kg (184 lb 4.9 oz)   05/12/23 0400 84.4 kg (186 lb 1.1 oz)       Body mass index is 29.78 kg/m².      Intake/Output Summary (Last 24 hours) at 5/14/2023 1300  Last data filed at 5/14/2023 0810  Gross per 24 hour   Intake 537.07 ml   Output 900 ml   Net -362.93 ml         Hemodynamic Parameters:  CVP:  [7 mmHg] 7 mmHg         Physical Exam  Constitutional:       Appearance: She is obese.      Comments: Alert, Oriented, No acute distress   HENT:      Head: Normocephalic and atraumatic.   Eyes:      Conjunctiva/sclera: Conjunctivae normal.   Neck:      Comments: JVP 8cm of H20 at 45 degrees.   Cardiovascular:      Rate and Rhythm: Normal rate and regular rhythm.   Pulmonary:      Comments: Normal effort, Clear to auscultation   Abdominal:      Comments: Soft, Non-tender, Non-distended   Musculoskeletal:      Cervical back: Normal range of motion.      Comments: No peripheral edema   Skin:     Comments: Dry, Intact, Warm, Capillary refill <2 seconds.    Neurological:      Mental Status: She is alert and oriented to person,  place, and time.      Comments: Normal speech   Psychiatric:         Mood and Affect: Mood and affect normal.          Significant Labs:  CBC:  Recent Labs   Lab 05/12/23  0552 05/13/23  0504 05/14/23  0436   WBC 3.32* 3.42* 3.71*   RBC 5.78* 5.85* 5.95*   HGB 9.9* 10.6* 10.6*   HCT 36.2* 37.3 36.3*    154 156   MCV 63* 64* 61*   MCH 17.1* 18.1* 17.8*   MCHC 27.3* 28.4* 29.2*       BNP:  Recent Labs   Lab 05/11/23  1735   BNP 3,087*       CMP:  Recent Labs   Lab 05/12/23  0552 05/12/23  1808 05/13/23  0504 05/13/23  1743 05/14/23  0436   GLU 92   < > 80 126* 97   CALCIUM 9.2   < > 9.0 9.3 9.2   ALBUMIN 3.3*  --  3.4*  --  3.3*   PROT 5.9*  --  6.3  --  5.9*      < > 143 141 142   K 3.7   < > 4.4 3.9 3.5   CO2 25   < > 26 24 27      < > 104 104 104   BUN 56*   < > 53* 51* 49*   CREATININE 1.8*   < > 2.1* 1.9* 1.8*   ALKPHOS 51*  --  52*  --  55   ALT 17  --  16  --  15   AST 21  --  26  --  19   BILITOT 1.5*  --  1.6*  --  1.6*    < > = values in this interval not displayed.        Coagulation:   No results for input(s): PT, INR, APTT in the last 168 hours.  LDH:  No results for input(s): LDH in the last 72 hours.  Microbiology:  Microbiology Results (last 7 days)       ** No results found for the last 168 hours. **            I have reviewed all pertinent labs within the past 24 hours.    Estimated Creatinine Clearance: 37.6 mL/min (A) (based on SCr of 1.8 mg/dL (H)).    Diagnostic Results:  I have reviewed all pertinent imaging results/findings within the past 24 hours.    Assessment and Plan:     57 year old female with NICM (since 2013), Pulmonary hypertension, DM, HTN, permanent AF and ICD, who presented today after she was referred from clinic to be admitted for palliative dobutamine. Patient was previously evaluated and presented to committee on 3/9/22.  At the time she was declined for OHT or LVAD due to renal function, lung function and difficulty consistently following up with the team as  well as poor medical adherence. Patient was seen yesterday in clinic by Dr. Orozco. Concerns regarding medication non-compliance and/or misunderstanding was raised during the visit. Patients renal function has deteriorated since her last discharge. She reports being un Entresto  mg, BID; however, it was noted that her Entresto was reduced during that hospital course. Today patient reports occasional shortness of breath on exertion but this has been unchanged since her last discharge. Patient endorses 2 pillow orthopnea, has not required any more lately and does report frequent PND. Patient denies any fever, chills, nausea, vomiting, hematemesis, cough, chest pain, palpitations, shortness of breath, abdominal pain, changes in bowel or urinary habits, no hematochezia or melena.      * Acute on chronic combined systolic and diastolic heart failure  - NICM  - Last RHC (3/29/2023): RA 9, PCWP 16, PA 58/29/39, PVR 5.6 BOLDEN, CO/CI 4.1/2.03, SVR 1151, Jayson 3  - Last Echo (3/27/2023): EF 15%. LV severely enlarged w/ eccentric hypertrophy and severely decreased systolic function. Grade 2 LV diastolic dysfunction. Normal RV size and moderately reduced systolic function. Biatrial enlargment. Mild MR. Small pericardial effusion. PA sys pressure 51mmHg.   - Home GDMT: Jardiance, Hydralaine 25mg tid, Isordil 30mg tid, Entresto 46mg bid, and toprolol 100mg daily.   - Home diuretic regimen: Bumex 4mg bid  - Euvolemic on exam today. Continue to hold diuretics. Will increase hydralazine to 37.5 tid and continue isordil 30mg tid and entresto 46/51bid.   - Ionotropes: Continue  2.5 for palliative purposes.   - Palliative care consulted.   - Strict I&Os and daily weights.   - Maintain K>4 and Mg> 2.     CKD (chronic kidney disease), stage IV  - Cr. 2.6 yesterday down to 2.2 today  - Baseline renal function appears to be around 1.9-2.2  - Worsening renal function may have been in the setting of high dose Entresto  - Monitor renal  function  - Avoid Nephrotoxic agents  - Monitor urine output     Restrictive lung disease  - PFTs 03/15/2023 demonstrates severe restriction and severely reduced MVV there is also the possibility of mild obstruction per report.  - Continue with oxygen supplementation as needed    WASHINGTON (acute kidney injury)  WASHINGTON on CKD stage 4 intially most likely secondary to hypervolemia. Baseline Cr around 1.5 (5/2/2023). Improving to 1.8 today while holding diuretics.     - will hold diuretic regimen.   - Avoid nephrotoxic agents.  - Will continue to monitor.     Type 2 diabetes mellitus with stage 4 chronic kidney disease, with long-term current use of insulin  - Most recent A1c: 5.9  - Hold Jardiance (Also given CrCl)  - FS + LSS        Angelita Downing MD  Heart Transplant  [facility]

## 2023-05-14 NOTE — PLAN OF CARE
Problem: Adult Inpatient Plan of Care  Goal: Plan of Care Review  Outcome: Ongoing, Progressing  Goal: Patient-Specific Goal (Individualized)  Outcome: Ongoing, Progressing  Goal: Absence of Hospital-Acquired Illness or Injury  Outcome: Ongoing, Progressing  Goal: Optimal Comfort and Wellbeing  Outcome: Ongoing, Progressing  Goal: Readiness for Transition of Care  Outcome: Ongoing, Progressing     Problem: Diabetes Comorbidity  Goal: Blood Glucose Level Within Targeted Range  Outcome: Ongoing, Progressing     Problem: Pain Acute  Goal: Acceptable Pain Control and Functional Ability  Outcome: Ongoing, Progressing     Problem: Infection  Goal: Absence of Infection Signs and Symptoms  Outcome: Ongoing, Progressing     Problem: Fluid and Electrolyte Imbalance (Acute Kidney Injury/Impairment)  Goal: Fluid and Electrolyte Balance  Outcome: Ongoing, Progressing     Problem: Oral Intake Inadequate (Acute Kidney Injury/Impairment)  Goal: Optimal Nutrition Intake  Outcome: Ongoing, Progressing     Problem: Renal Function Impairment (Acute Kidney Injury/Impairment)  Goal: Effective Renal Function  Outcome: Ongoing, Progressing     Problem: Coping Ineffective  Goal: Effective Coping  Outcome: Ongoing, Progressing

## 2023-05-15 VITALS
RESPIRATION RATE: 19 BRPM | WEIGHT: 186.5 LBS | HEIGHT: 66 IN | OXYGEN SATURATION: 91 % | TEMPERATURE: 98 F | BODY MASS INDEX: 29.97 KG/M2 | SYSTOLIC BLOOD PRESSURE: 118 MMHG | DIASTOLIC BLOOD PRESSURE: 78 MMHG | HEART RATE: 90 BPM

## 2023-05-15 DIAGNOSIS — I50.42 CHRONIC COMBINED SYSTOLIC AND DIASTOLIC HEART FAILURE: Primary | ICD-10-CM

## 2023-05-15 PROBLEM — R53.81 DEBILITY: Status: ACTIVE | Noted: 2023-05-15

## 2023-05-15 PROBLEM — Z71.89 ADVANCE CARE PLANNING: Status: ACTIVE | Noted: 2023-05-15

## 2023-05-15 PROBLEM — Z51.5 PALLIATIVE CARE ENCOUNTER: Status: ACTIVE | Noted: 2023-05-15

## 2023-05-15 PROBLEM — I50.20 ACC/AHA STAGE D SYSTOLIC HEART FAILURE: Status: ACTIVE | Noted: 2023-05-15

## 2023-05-15 PROBLEM — R06.00 DYSPNEA: Status: ACTIVE | Noted: 2023-05-15

## 2023-05-15 LAB
ALBUMIN SERPL BCP-MCNC: 3.4 G/DL (ref 3.5–5.2)
ALLENS TEST: ABNORMAL
ALP SERPL-CCNC: 55 U/L (ref 55–135)
ALT SERPL W/O P-5'-P-CCNC: 16 U/L (ref 10–44)
ANION GAP SERPL CALC-SCNC: 9 MMOL/L (ref 8–16)
AST SERPL-CCNC: 19 U/L (ref 10–40)
BACTERIA FLD AEROBE CULT: NO GROWTH
BASOPHILS # BLD AUTO: 0.02 K/UL (ref 0–0.2)
BASOPHILS NFR BLD: 0.6 % (ref 0–1.9)
BILIRUB SERPL-MCNC: 1.6 MG/DL (ref 0.1–1)
BNP SERPL-MCNC: 394 PG/ML (ref 0–99)
BUN SERPL-MCNC: 53 MG/DL (ref 6–20)
CALCIUM SERPL-MCNC: 9.3 MG/DL (ref 8.7–10.5)
CHLORIDE SERPL-SCNC: 106 MMOL/L (ref 95–110)
CO2 SERPL-SCNC: 25 MMOL/L (ref 23–29)
CREAT SERPL-MCNC: 1.9 MG/DL (ref 0.5–1.4)
DIFFERENTIAL METHOD: ABNORMAL
EOSINOPHIL # BLD AUTO: 0.1 K/UL (ref 0–0.5)
EOSINOPHIL NFR BLD: 3.8 % (ref 0–8)
ERYTHROCYTE [DISTWIDTH] IN BLOOD BY AUTOMATED COUNT: 27.9 % (ref 11.5–14.5)
EST. GFR  (NO RACE VARIABLE): 30.4 ML/MIN/1.73 M^2
FERRITIN SERPL-MCNC: 260 NG/ML (ref 20–300)
GLUCOSE SERPL-MCNC: 90 MG/DL (ref 70–110)
GRAM STN SPEC: NORMAL
GRAM STN SPEC: NORMAL
HCO3 UR-SCNC: 27.5 MMOL/L (ref 24–28)
HCT VFR BLD AUTO: 35.3 % (ref 37–48.5)
HGB BLD-MCNC: 10.1 G/DL (ref 12–16)
IMM GRANULOCYTES # BLD AUTO: 0.01 K/UL (ref 0–0.04)
IMM GRANULOCYTES NFR BLD AUTO: 0.3 % (ref 0–0.5)
IRON SERPL-MCNC: 61 UG/DL (ref 30–160)
LYMPHOCYTES # BLD AUTO: 0.9 K/UL (ref 1–4.8)
LYMPHOCYTES NFR BLD: 26.6 % (ref 18–48)
MAGNESIUM SERPL-MCNC: 2.4 MG/DL (ref 1.6–2.6)
MCH RBC QN AUTO: 18.1 PG (ref 27–31)
MCHC RBC AUTO-ENTMCNC: 28.6 G/DL (ref 32–36)
MCV RBC AUTO: 63 FL (ref 82–98)
MONOCYTES # BLD AUTO: 0.4 K/UL (ref 0.3–1)
MONOCYTES NFR BLD: 12.4 % (ref 4–15)
NEUTROPHILS # BLD AUTO: 1.9 K/UL (ref 1.8–7.7)
NEUTROPHILS NFR BLD: 56.3 % (ref 38–73)
NRBC BLD-RTO: 0 /100 WBC
PCO2 BLDA: 51.1 MMHG (ref 35–45)
PH SMN: 7.34 [PH] (ref 7.35–7.45)
PLATELET # BLD AUTO: 147 K/UL (ref 150–450)
PMV BLD AUTO: ABNORMAL FL (ref 9.2–12.9)
PO2 BLDA: 34 MMHG (ref 40–60)
POC BE: 2 MMOL/L
POC SATURATED O2: 62 % (ref 95–100)
POC TCO2: 29 MMOL/L (ref 24–29)
POCT GLUCOSE: 100 MG/DL (ref 70–110)
POCT GLUCOSE: 116 MG/DL (ref 70–110)
POTASSIUM SERPL-SCNC: 4.6 MMOL/L (ref 3.5–5.1)
PROT SERPL-MCNC: 6.2 G/DL (ref 6–8.4)
RBC # BLD AUTO: 5.59 M/UL (ref 4–5.4)
SAMPLE: ABNORMAL
SATURATED IRON: 23 % (ref 20–50)
SITE: ABNORMAL
SODIUM SERPL-SCNC: 140 MMOL/L (ref 136–145)
TOTAL IRON BINDING CAPACITY: 263 UG/DL (ref 250–450)
TRANSFERRIN SERPL-MCNC: 178 MG/DL (ref 200–375)
WBC # BLD AUTO: 3.38 K/UL (ref 3.9–12.7)

## 2023-05-15 PROCEDURE — 82728 ASSAY OF FERRITIN: CPT | Performed by: STUDENT IN AN ORGANIZED HEALTH CARE EDUCATION/TRAINING PROGRAM

## 2023-05-15 PROCEDURE — 84466 ASSAY OF TRANSFERRIN: CPT | Performed by: STUDENT IN AN ORGANIZED HEALTH CARE EDUCATION/TRAINING PROGRAM

## 2023-05-15 PROCEDURE — 99233 SBSQ HOSP IP/OBS HIGH 50: CPT | Mod: ,,, | Performed by: INTERNAL MEDICINE

## 2023-05-15 PROCEDURE — 83735 ASSAY OF MAGNESIUM: CPT | Performed by: STUDENT IN AN ORGANIZED HEALTH CARE EDUCATION/TRAINING PROGRAM

## 2023-05-15 PROCEDURE — 99223 1ST HOSP IP/OBS HIGH 75: CPT | Mod: ,,, | Performed by: CLINICAL NURSE SPECIALIST

## 2023-05-15 PROCEDURE — 83880 ASSAY OF NATRIURETIC PEPTIDE: CPT | Performed by: STUDENT IN AN ORGANIZED HEALTH CARE EDUCATION/TRAINING PROGRAM

## 2023-05-15 PROCEDURE — 85025 COMPLETE CBC W/AUTO DIFF WBC: CPT | Performed by: STUDENT IN AN ORGANIZED HEALTH CARE EDUCATION/TRAINING PROGRAM

## 2023-05-15 PROCEDURE — 27000221 HC OXYGEN, UP TO 24 HOURS

## 2023-05-15 PROCEDURE — 82803 BLOOD GASES ANY COMBINATION: CPT

## 2023-05-15 PROCEDURE — 25000003 PHARM REV CODE 250: Performed by: INTERNAL MEDICINE

## 2023-05-15 PROCEDURE — 99233 PR SUBSEQUENT HOSPITAL CARE,LEVL III: ICD-10-PCS | Mod: ,,, | Performed by: INTERNAL MEDICINE

## 2023-05-15 PROCEDURE — 99497 ADVNCD CARE PLAN 30 MIN: CPT | Mod: 25,,, | Performed by: CLINICAL NURSE SPECIALIST

## 2023-05-15 PROCEDURE — 25000003 PHARM REV CODE 250: Performed by: STUDENT IN AN ORGANIZED HEALTH CARE EDUCATION/TRAINING PROGRAM

## 2023-05-15 PROCEDURE — 36415 COLL VENOUS BLD VENIPUNCTURE: CPT | Performed by: STUDENT IN AN ORGANIZED HEALTH CARE EDUCATION/TRAINING PROGRAM

## 2023-05-15 PROCEDURE — 99223 PR INITIAL HOSPITAL CARE,LEVL III: ICD-10-PCS | Mod: ,,, | Performed by: CLINICAL NURSE SPECIALIST

## 2023-05-15 PROCEDURE — 99900035 HC TECH TIME PER 15 MIN (STAT)

## 2023-05-15 PROCEDURE — 94761 N-INVAS EAR/PLS OXIMETRY MLT: CPT

## 2023-05-15 PROCEDURE — 80053 COMPREHEN METABOLIC PANEL: CPT | Performed by: STUDENT IN AN ORGANIZED HEALTH CARE EDUCATION/TRAINING PROGRAM

## 2023-05-15 PROCEDURE — A4216 STERILE WATER/SALINE, 10 ML: HCPCS | Performed by: INTERNAL MEDICINE

## 2023-05-15 PROCEDURE — 99497 PR ADVNCD CARE PLAN 30 MIN: ICD-10-PCS | Mod: 25,,, | Performed by: CLINICAL NURSE SPECIALIST

## 2023-05-15 RX ORDER — ISOSORBIDE DINITRATE AND HYDRALAZINE HYDROCHLORIDE 37.5; 2 MG/1; MG/1
1 TABLET ORAL 3 TIMES DAILY
Qty: 90 TABLET | Refills: 11 | Status: SHIPPED | OUTPATIENT
Start: 2023-05-15 | End: 2023-10-26

## 2023-05-15 RX ORDER — BUMETANIDE 2 MG/1
TABLET ORAL
Qty: 120 TABLET | Refills: 11 | Status: ON HOLD | OUTPATIENT
Start: 2023-05-15 | End: 2023-06-21 | Stop reason: SDUPTHER

## 2023-05-15 RX ADMIN — Medication 10 ML: at 12:05

## 2023-05-15 RX ADMIN — AMIODARONE HYDROCHLORIDE 200 MG: 200 TABLET ORAL at 09:05

## 2023-05-15 RX ADMIN — ISOSORBIDE DINITRATE: 20 TABLET ORAL at 09:05

## 2023-05-15 RX ADMIN — Medication 10 ML: at 06:05

## 2023-05-15 RX ADMIN — PANTOPRAZOLE SODIUM 40 MG: 40 TABLET, DELAYED RELEASE ORAL at 09:05

## 2023-05-15 RX ADMIN — APIXABAN 5 MG: 5 TABLET, FILM COATED ORAL at 09:05

## 2023-05-15 RX ADMIN — SACUBITRIL AND VALSARTAN 1 TABLET: 49; 51 TABLET, FILM COATED ORAL at 09:05

## 2023-05-15 NOTE — PLAN OF CARE
Problem: Adult Inpatient Plan of Care  Goal: Plan of Care Review  Outcome: Ongoing, Progressing  Goal: Patient-Specific Goal (Individualized)  Outcome: Ongoing, Progressing  Goal: Absence of Hospital-Acquired Illness or Injury  Outcome: Ongoing, Progressing  Goal: Optimal Comfort and Wellbeing  Outcome: Ongoing, Progressing  Goal: Readiness for Transition of Care  Outcome: Ongoing, Progressing     Problem: Diabetes Comorbidity  Goal: Blood Glucose Level Within Targeted Range  Outcome: Ongoing, Progressing     Problem: Pain Acute  Goal: Acceptable Pain Control and Functional Ability  Outcome: Ongoing, Progressing     Problem: Infection  Goal: Absence of Infection Signs and Symptoms  Outcome: Ongoing, Progressing     Problem: Oral Intake Inadequate (Acute Kidney Injury/Impairment)  Goal: Optimal Nutrition Intake  Outcome: Ongoing, Progressing     Problem: Renal Function Impairment (Acute Kidney Injury/Impairment)  Goal: Effective Renal Function  Outcome: Ongoing, Progressing     Problem: Coping Ineffective  Goal: Effective Coping  Outcome: Ongoing, Progressing

## 2023-05-15 NOTE — HPI
Pt is a 57 year old female with NICM (since 2013), Pulmonary hypertension, DM, HTN, permanent AF and ICD, who presented today after she was referred from clinic to be admitted for palliative dobutamine. Per chart review, patient was previously evaluated and presented to committee on 3/9/22.  At the time she was declined for OHT or LVAD due to renal function, lung function and difficulty consistently following up with the team as well as poor medical adherence. Patient was seen yesterday in clinic by Dr. Orozco. Concerns regarding medication non-compliance and/or misunderstanding was raised during the visit. Patients renal function has deteriorated since her last discharge. She reports being un Entresto  mg, BID; however, it was noted that her Entresto was reduced during that hospital course. Today patient reports occasional shortness of breath on exertion but this has been unchanged since her last discharge. Patient endorses 2 pillow orthopnea, has not required any more lately and does report frequent PND. Patient denies any fever, chills, nausea, vomiting, hematemesis, cough, chest pain, palpitations, shortness of breath, abdominal pain, changes in bowel or urinary habits, no hematochezia or melena.      Pt to d/c with home health and Dobutamine.

## 2023-05-15 NOTE — SUBJECTIVE & OBJECTIVE
"Interval History: No acute complaints overnight, no issues with shortness of breath or chest pain.  Doing well on  2.5.  Not currently on diuretics.  Clinically stable for discharge today pending home  setup.      I: 660  O: 1300  Net: -640  No diuretics    HD on  2.5  SVO2 62  CO 5.71  CI 2.88    Continuous Infusions:   DOBUTamine IV infusion (non-titrating) 2.5 mcg/kg/min (05/12/23 9241)     Scheduled Meds:   amiodarone  200 mg Oral Daily    apixaban  5 mg Oral BID    atorvastatin  40 mg Oral QHS    EScitalopram oxalate  5 mg Oral Daily    hydrALAZINE-hydrALAZINE-isorsorbide dinitrate (BIDIL 20/37.5) combination 1 tablet   Oral TID    isosorbide dinitrate  10 mg Oral TID    mupirocin   Nasal BID    pantoprazole  40 mg Oral Daily    sacubitriL-valsartan  1 tablet Oral BID    sodium chloride 0.9%  10 mL Intravenous Q6H     PRN Meds:dextrose 10%, dextrose 10%, dextrose, dextrose, diclofenac sodium, glucagon (human recombinant), insulin aspart U-100, sodium chloride 0.9%, Flushing PICC Protocol **AND** sodium chloride 0.9% **AND** sodium chloride 0.9%    Review of patient's allergies indicates:   Allergen Reactions    Penicillins Hives and Other (See Comments)    Iodinated contrast media Nausea And Vomiting    Oxycodone-acetaminophen Other (See Comments)     Nausea, Dizziness, Anxiety.  "I don't like how it makes me feel."   Given Hydromorphone 0.5mg IVP  Without problems.  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   Other drugs that have been demonstrated to be impacted by homozygosity for the UGT1A1 TA7 promoter variant " include pazopanib, nilotinib, atazanavir, and belinostat. Metabolism of other drugs not listed here may also be impacted by UGT1A1 enzyme activity.       Tramadol Nausea And Vomiting and Other (See Comments)     Other reaction(s): Other (See Comments)    Valsartan Other (See Comments)     Objective:     Vital Signs (Most Recent):  Temp: 98.1 °F (36.7 °C) (05/15/23 0750)  Pulse: 83 (05/15/23 0750)  Resp: 20 (05/15/23 0750)  BP: 125/73 (05/15/23 0750)  SpO2: 98 % (05/15/23 0750) Vital Signs (24h Range):  Temp:  [97.7 °F (36.5 °C)-98.7 °F (37.1 °C)] 98.1 °F (36.7 °C)  Pulse:  [76-92] 83  Resp:  [16-20] 20  SpO2:  [92 %-98 %] 98 %  BP: ()/(61-76) 125/73     Patient Vitals for the past 72 hrs (Last 3 readings):   Weight   05/15/23 0547 84.6 kg (186 lb 8.2 oz)   05/14/23 0440 83.7 kg (184 lb 8.4 oz)   05/13/23 0538 83.6 kg (184 lb 4.9 oz)     Body mass index is 30.1 kg/m².      Intake/Output Summary (Last 24 hours) at 5/15/2023 1005  Last data filed at 5/14/2023 1722  Gross per 24 hour   Intake 180 ml   Output 400 ml   Net -220 ml        Physical Exam  Constitutional:       Appearance: She is obese.      Comments: Alert, Oriented, No acute distress   HENT:      Head: Normocephalic and atraumatic.   Eyes:      Conjunctiva/sclera: Conjunctivae normal.   Neck:      Comments: JVP 8cm of H20 at 45 degrees.   Cardiovascular:      Rate and Rhythm: Normal rate and regular rhythm.   Pulmonary:      Comments: Normal effort, Clear to auscultation   Abdominal:      Comments: Soft, Non-tender, Non-distended   Musculoskeletal:      Cervical back: Normal range of motion.      Comments: No peripheral edema   Skin:     Comments: Dry, Intact, Warm, Capillary refill <2 seconds.    Neurological:      Mental Status: She is alert and oriented to person, place, and time.      Comments: Normal speech   Psychiatric:         Mood and Affect: Mood and affect normal.          Significant Labs:  CBC:  Recent Labs   Lab 05/13/23  0509  05/14/23  0436 05/15/23  0317   WBC 3.42* 3.71* 3.38*   RBC 5.85* 5.95* 5.59*   HGB 10.6* 10.6* 10.1*   HCT 37.3 36.3* 35.3*    156 147*   MCV 64* 61* 63*   MCH 18.1* 17.8* 18.1*   MCHC 28.4* 29.2* 28.6*     BNP:  Recent Labs   Lab 05/11/23  1735   BNP 3,087*     CMP:  Recent Labs   Lab 05/13/23  0504 05/13/23  1743 05/14/23 0436 05/14/23  1824 05/15/23  0317   GLU 80   < > 97 142* 90   CALCIUM 9.0   < > 9.2 9.5 9.3   ALBUMIN 3.4*  --  3.3*  --  3.4*   PROT 6.3  --  5.9*  --  6.2      < > 142 139 140   K 4.4   < > 3.5 4.7 4.6   CO2 26   < > 27 24 25      < > 104 106 106   BUN 53*   < > 49* 52* 53*   CREATININE 2.1*   < > 1.8* 2.0* 1.9*   ALKPHOS 52*  --  55  --  55   ALT 16  --  15  --  16   AST 26  --  19  --  19   BILITOT 1.6*  --  1.6*  --  1.6*    < > = values in this interval not displayed.      Coagulation:   No results for input(s): PT, INR, APTT in the last 168 hours.  LDH:  No results for input(s): LDH in the last 72 hours.  Microbiology:  Microbiology Results (last 7 days)       ** No results found for the last 168 hours. **            I have reviewed all pertinent labs within the past 24 hours.    Estimated Creatinine Clearance: 35.8 mL/min (A) (based on SCr of 1.9 mg/dL (H)).    Diagnostic Results:  I have reviewed all pertinent imaging results/findings within the past 24 hours.

## 2023-05-15 NOTE — PROGRESS NOTES
Discharge Note:  TONYA notified by team of pt's dc today with needs for palliative . TONYA spoke with Sandra with [facility] and referral provided.  TONYA called several home health agencies in pt's area, but not able to locate a provider.  TONYA called [facility], [facility], [facility], [facility], and [facility].  All are out of network.  TONYA contacted [facility] ([facility]) to coordinate outpt PICC line care and labs.  Orders and records faxed to [facility].    [facility]: [phone]  Fax: [phone]  [facility]: [phone]

## 2023-05-15 NOTE — ASSESSMENT & PLAN NOTE
- NICM, ACC/AHA Stage D heart failure  - Last RHC (3/29/2023): RA 9, PCWP 16, PA 58/29/39, PVR 5.6 BOLDEN, CO/CI 4.1/2.03, SVR 1151, Jayson 3  - Last Echo (3/27/2023): EF 15%. LV severely enlarged w/ eccentric hypertrophy and severely decreased systolic function. Grade 2 LV diastolic dysfunction. Normal RV size and moderately reduced systolic function. Biatrial enlargment. Mild MR. Small pericardial effusion. PA sys pressure 51mmHg.   - Home GDMT: Jardiance, Hydralaine 25mg tid, Isordil 30mg tid, Entresto 46mg bid, and toprolol 100mg daily.   - Home diuretic regimen: Bumex 4mg bid  - Euvolemic on exam today. Will increase hydralazine to 37.5 tid and continue isordil 30mg tid and entresto 46/51bid.   - Will discharge today on bumex 2mg qAM and 1mg qPM for maintenance diuretics as outpatient  - Ionotropes: Continue  2.5 for palliative purposes, pending setup per case management and insurance  - Palliative care consulted.   - Strict I&Os and daily weights.   - Maintain K>4 and Mg> 2.

## 2023-05-15 NOTE — CONSULTS
"[facility]  Palliative Medicine  Consult Note    Patient Name: Hafsa Hawley  MRN: [id number]  Admission Date: 5/11/2023  Hospital Length of Stay: 4 days  Code Status: Full Code   Attending Provider: Bertha Lorenzo MD  Consulting Provider: SURI Carrillo  Primary Care Physician: Primary Doctor No  Principal Problem:Acute on chronic combined systolic and diastolic heart failure    Patient information was obtained from patient and primary team.      Consults  Assessment/Plan:     Palliative Care  Palliative care encounter  Impression: Pt is a 56 y/o female with advanced heart failure. Pt is not a candidate for advanced options. Pt started on Dobutamine. Pt is on O2 per NC. Pt is alert, oriented to person, place, time, and situation.     Reason for consult: ACP/GOC     Goals of care/ACP:     Met with pt who is aware of her heart issues. Per pt, she is not a candidate for advanced options. Per pt she was having trouble with getting her meds from er pharmacy in [place] and she feels this was a barrier.   Spoke to pt about her plan of care. Education done with pt on palliative intent of Dobutamine. She verbalized understanding and is aware Dobutamine will not "cure" her heart disease.     Pt open to following up with palliative care in clinic.     MPOA: Erika Estrada.     Per pt her  lives at her house and they had been  for a while.     Symptom management:     Dyspnea:   Pt is on O2 per NC.   Pt is on Dobutamine. Pt to d/c with home based Dobutamine.     Debility r/t to heart failure and flare up of Gout.   Continue to work with PT/OT.     Plan:   Pt to d/c home with home health.   Will schedule clinic  F/u appt which may have to be virtual per pt.               Thank you for your consult. I will follow-up with patient. Please contact us if you have any additional questions.    Subjective:     HPI:   Pt is a 57 year old female with NICM (since 2013), Pulmonary " hypertension, DM, HTN, permanent AF and ICD, who presented today after she was referred from clinic to be admitted for palliative dobutamine. Per chart review, patient was previously evaluated and presented to committee on 3/9/22.  At the time she was declined for OHT or LVAD due to renal function, lung function and difficulty consistently following up with the team as well as poor medical adherence. Patient was seen yesterday in clinic by Dr. Orozco. Concerns regarding medication non-compliance and/or misunderstanding was raised during the visit. Patients renal function has deteriorated since her last discharge. She reports being un Entresto  mg, BID; however, it was noted that her Entresto was reduced during that hospital course. Today patient reports occasional shortness of breath on exertion but this has been unchanged since her last discharge. Patient endorses 2 pillow orthopnea, has not required any more lately and does report frequent PND. Patient denies any fever, chills, nausea, vomiting, hematemesis, cough, chest pain, palpitations, shortness of breath, abdominal pain, changes in bowel or urinary habits, no hematochezia or melena.      Pt to d/c with home health and Dobutamine.       Hospital Course:  No notes on file    Interval History: Pt to d/c home with home health and Dobutamine.     Past Medical History:   Diagnosis Date    Anemia     Arthritis     Atrial fibrillation     OAC    Chronic respiratory failure with hypoxia, on home oxygen therapy     2L with activity, off at rest.  Per Pulm  no overt evidence of ILD or COPD on PFTs and CT to explain O2 needs.    CKD (chronic kidney disease), stage IV 05/08/2018    Congestive heart failure     s/p AICD placement,    Deep vein thrombosis     Depression     elevated bilirubin d/t Gilbert's syndrome     confirmed by [place] genetic testing, evaluated by hepatology    Encounter for blood transfusion     GERD (gastroesophageal reflux disease)      Hypertension     Pheochromocytoma, malignant     Right kidney mass     Sleep apnea     Thalassemia trait, alpha     Thyroid disease     Type 2 diabetes mellitus with hyperglycemia, without long-term current use of insulin 08/13/2020       Past Surgical History:   Procedure Laterality Date    APPENDECTOMY      BONE MARROW BIOPSY      CARDIAC DEFIBRILLATOR PLACEMENT Left 12/2016    CARDIAC ELECTROPHYSIOLOGY MAPPING AND ABLATION      CARDIAC ELECTROPHYSIOLOGY MAPPING AND ABLATION      COLONOSCOPY N/A 5/6/2022    Procedure: COLONOSCOPY;  Surgeon: Arely Betancourt MD;  Location: [facility] ENDO (2ND FLR);  Service: Endoscopy;  Laterality: N/A;  heart transplant candidate/ EF 25% - 2nd floor/ defib - Biotronik - [facility]  Eliquis - per Dr. Cortez with [facility], Pt ok to hold Eliquis x 2 days prior-see media tab-outside correspondence dated 12/30/21  - [facility]  verbal instructions/portal instructions/email instructions - s    EYE SURGERY      due to running tears    FRACTURE SURGERY Left     hand 5th digit    HYSTERECTOMY      KNEE SURGERY Left 2016    hematoma    LIVER BIOPSY  10/24/2018    Minimal steatosis, predominantly macrovesicular, 1%, Minimal nonspecific portal inflammation, no fibrosis. No findings on biopsy to explain elevated bilirubin levels. Could be d/t Gilbert's =?- hemolysis    RIGHT HEART CATHETERIZATION Right 12/07/2021    Procedure: INSERTION, CATHETER, RIGHT HEART;  Surgeon: Irving Cardenas MD;  Location: [facility] CATH LAB;  Service: Cardiology;  Laterality: Right;    RIGHT HEART CATHETERIZATION Right 12/19/2022    Procedure: INSERTION, CATHETER, RIGHT HEART;  Surgeon: Burke Camilo MD;  Location: [facility] CATH LAB;  Service: Cardiology;  Laterality: Right;    RIGHT HEART CATHETERIZATION Right 3/29/2023    Procedure: INSERTION, CATHETER, RIGHT HEART;  Surgeon: Katie Liriano DO;  Location: [facility] CATH LAB;  Service: Cardiology;  Laterality: Right;    TRANSJUGULAR BIOPSY OF LIVER N/A  "10/24/2018    Procedure: BIOPSY, LIVER, TRANSJUGULAR APPROACH;  Surgeon: Carmen Diagnostic Provider;  Location: [facility] OR [address] [place] [address];  Service: Radiology;  Laterality: N/A;       Review of patient's allergies indicates:   Allergen Reactions    Penicillins Hives and Other (See Comments)    Iodinated contrast media Nausea And Vomiting    Oxycodone-acetaminophen Other (See Comments)     Nausea, Dizziness, Anxiety.  "I don't like how it makes me feel."   Given Hydromorphone 0.5mg IVP  Without problems.  Other reaction(s): Other (See Comments)    Clonazepam Other (See Comments)    Diovan hct [valsartan-hydrochlorothiazide] Other (See Comments)     Causes coughing    Iodine Other (See Comments)    Irinotecan      Pt has homozygosity for the TA7 promoter variant that places this individual at significantly increased risk for   severe neutropenia (grade 4) when treated with the standard dose of irinotecan (risk approximately 50%).   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 Metabolism of other drugs not listed here may also be impacted by UGT1A1 enzyme activity.       Tramadol Nausea And Vomiting and Other (See Comments)     Other reaction(s): Other (See Comments)    Valsartan Other (See Comments)       Medications:  Continuous Infusions:   DOBUTamine IV infusion (non-titrating) 2.5 mcg/kg/min (05/12/23 0781)     Scheduled Meds:   amiodarone  200 mg Oral Daily    apixaban  5 mg Oral BID    atorvastatin  40 mg Oral QHS    EScitalopram oxalate  5 mg Oral Daily    hydrALAZINE-hydrALAZINE-isorsorbide dinitrate (BIDIL 20/37.5) combination 1 tablet   Oral TID    isosorbide dinitrate  10 mg Oral TID    mupirocin   Nasal BID    pantoprazole  40 mg Oral Daily    sacubitriL-valsartan  1 tablet Oral BID    sodium chloride 0.9%  10 mL Intravenous Q6H     PRN Meds:dextrose 10%, dextrose 10%, dextrose, " dextrose, diclofenac sodium, glucagon (human recombinant), insulin aspart U-100, sodium chloride 0.9%, Flushing PICC Protocol **AND** sodium chloride 0.9% **AND** sodium chloride 0.9%    Family History       Problem Relation (Age of Onset)    Cancer Mother    Cirrhosis Brother    Diabetes Brother    Heart attack Father    Heart disease Father, Sister, Brother, Sister, Brother    Hypertension Father, Brother          Tobacco Use    Smoking status: Never    Smokeless tobacco: Never   Substance and Sexual Activity    Alcohol use: Yes     Comment: up to 1 yr ago drank 2-3 drinks on occasion but sporadic    Drug use: No    Sexual activity: Yes     Partners: Male       Review of Systems   Constitutional:  Positive for activity change and fatigue.   Respiratory:  Negative for shortness of breath.    Cardiovascular:  Negative for chest pain.   Neurological:  Positive for weakness.   Psychiatric/Behavioral: Negative.     Objective:     Vital Signs (Most Recent):  Temp: 98.1 °F (36.7 °C) (05/15/23 0750)  Pulse: 83 (05/15/23 0750)  Resp: 20 (05/15/23 0750)  BP: 125/73 (05/15/23 0750)  SpO2: 98 % (05/15/23 0750) Vital Signs (24h Range):  Temp:  [97.7 °F (36.5 °C)-98.7 °F (37.1 °C)] 98.1 °F (36.7 °C)  Pulse:  [76-92] 83  Resp:  [16-20] 20  SpO2:  [92 %-98 %] 98 %  BP: ()/(61-76) 125/73     Weight: 84.6 kg (186 lb 8.2 oz)  Body mass index is 30.1 kg/m².       Physical Exam  Constitutional:       Interventions: Nasal cannula in place.   HENT:      Head: Normocephalic and atraumatic.   Eyes:      General: Lids are normal.      Conjunctiva/sclera: Conjunctivae normal.   Cardiovascular:      Comments: Pt on Dobutamine.   Pulmonary:      Effort: Pulmonary effort is normal. No respiratory distress.   Musculoskeletal:      Cervical back: Full passive range of motion without pain and normal range of motion.      Comments: Normal ROM   Skin:     General: Skin is warm and dry.   Neurological:      Mental Status: She is alert and  oriented to person, place, and time.   Psychiatric:         Attention and Perception: Attention normal.         Mood and Affect: Mood normal.         Speech: Speech normal.         Behavior: Behavior is cooperative.          Review of Symptoms      Symptom Assessment (ESAS 0-10 Scale)  Pain:  0  Dyspnea:  0  Anxiety:  0  Nausea:  0  Depression:  0  Anorexia:  0  Fatigue:  0  Insomnia:  0  Restlessness:  0  Agitation:  0         Psychosocial/Cultural:   See Palliative Psychosocial Note: No  Pt lives in [place]. Per pt her  who she had been  from and her granddaughter live at the house with her.   **Primary  to Follow**  Palliative Care  Consult: No      Advance Care Planning   Advance Directives:   Medical Power of : Yes    Agent's Name:  Elba Estrada.    Decision Making:  Patient answered questions  Goals of Care: What is most important right now is to focus on curative/life-prolongation (regardless of treatment burdens). Accordingly, we have decided that the best plan to meet the patient's goals includes continuing with treatment.       Significant Labs: All pertinent labs within the past 24 hours have been reviewed.  CBC:   Recent Labs   Lab 05/15/23  0317   WBC 3.38*   HGB 10.1*   HCT 35.3*   MCV 63*   *     BMP:  Recent Labs   Lab 05/15/23  0317   GLU 90      K 4.6      CO2 25   BUN 53*   CREATININE 1.9*   CALCIUM 9.3   MG 2.4     LFT:  Lab Results   Component Value Date    AST 19 05/15/2023    ALKPHOS 55 05/15/2023    BILITOT 1.6 (H) 05/15/2023     Albumin:   Albumin   Date Value Ref Range Status   05/15/2023 3.4 (L) 3.5 - 5.2 g/dL Final     Protein:   Total Protein   Date Value Ref Range Status   05/15/2023 6.2 6.0 - 8.4 g/dL Final     Lactic acid:   Lab Results   Component Value Date    LACTATE 2.1 04/26/2023    LACTATE 0.6 01/04/2023       Significant Imaging: I have reviewed all pertinent imaging results/findings within the past 24  hours.        20 minutes of ACP completed.       Betty Maria, CNS  Palliative Medicine  [facility] - [facility] Step[facility]

## 2023-05-15 NOTE — HOSPITAL COURSE
The patient was admitted for volume overload with WASHINGTON due to cardiorenal syndrome.  Initially improved with diuresis, and was started on a dobutamine infusion with improvement in clinical status.  Her baseline Cr believed to be 1.9.  She did not have any issues with shortness of breath or chest pain.  She had intermittent episodes of lightheadedness but on the day of discharge tolerated her current GDMT regimen well without episodes of hypotension.  She was set up with home dobutamine infusion at 2.5mcg/kg/min with a plan to follow closely in clinic with weekly labs with home health.  Palliative consulted and discussed goals of care with the patient.  She was discharged in stable condition.  Dry weight is 84.6kg and Cr at baseline is 1.9.

## 2023-05-15 NOTE — PLAN OF CARE
[facility]   Heart Transplant Clinic  [address] [place] [address]   [phone] after hours        HOME  HEALTH ORDERS      Admit to Home Health    Diagnosis:   Patient Active Problem List   Diagnosis    Elevated troponin    Microcytic anemia    Essential hypertension    MELISSA (obstructive sleep apnea)    Paroxysmal atrial fibrillation    NICM (nonischemic cardiomyopathy)    Adrenal mass 1 cm to 4 cm in diameter    Lumbar degenerative disc disease    Chronic combined systolic and diastolic heart failure    Left ventricular hypertrophy    Depression due to physical illness    ICD (implantable cardioverter-defibrillator) in place    Paroxysmal supraventricular tachycardia    elevated bilirubin d/t Gilbert's syndrome    Screen for colon cancer    Type 2 diabetes mellitus with stage 4 chronic kidney disease, with long-term current use of insulin    Leukopenia    Chronic respiratory failure with hypoxia and hypercapnia    Shortness of breath    DVT prophylaxis    WASHINGTON (acute kidney injury)    Muscle twitch    Acute on chronic combined systolic and diastolic heart failure    Elevated serum immunoglobulin free light chains    Hypertensive cardiovascular-renal disease, stage 1-4 or unspecified chronic kidney disease, with heart failure    Restrictive lung disease    CKD (chronic kidney disease), stage IV    NSVT (nonsustained ventricular tachycardia)    Prolonged Q-T interval on ECG    Acute decompensated heart failure    Demand ischemia    Pulmonary HTN    Aortic atherosclerosis    Pulmonary hypertension    Class 1 obesity due to excess calories without serious comorbidity in adult    Chills    COPD exacerbation    Asymptomatic bacteriuria       Patient is homebound due to:   Diet: Low Sodium  Acitivities: As Tolerated    Nursing:   SN to complete comprehensive assessment including routine vital signs. Instruct on disease process and s/s of complications to report to  MD. Review/verify medication list sent home with the patient at time of discharge  and instruct patient/caregiver as needed. Frequency may be adjusted depending on start of care date.    Notify MD if SBP > 160 or < 90; DBP > 90 or < 50; HR > 120 or < 50; Temp > 101; Weight gain >3lbs in 1 day or 5lbs in 1 week.    LABS:  SN to perform labs:   CMP, CBC weekly starting on 5/22/23    HOME INFUSION THERAPY:      Administer (drug and dose): Dobutamine 2.5mcg/kg/min    **For questions or concerns, please call [phone] and ask for Pre-Heart transplant clinic, M-F 8-5. After hours, weekends, call [phone] and ask for the Heart Transplant Cardiologist on call.**    Central line care:  Scrub the Hub: Prior to accessing the line, always perform a 30 second alcohol scrub  Each lumen of the central line is to be flushed at least daily with 10 mL Normal Saline and 3 mL Heparin flush (100 units/mL)  Skilled Nurse (SN) may draw blood from IV access  Blood Draw Procedure:   - Aspirate at least 5 mL of blood   - Discard   - Obtain specimen   - Change posiflow cap   - Flush with 20 mL Normal Saline followed by a                 3-5 mL Heparin flush (100 units/mL)  Central :   - Sterile dressing changes are done weekly and as needed.   - Use chlor-hexadine scrub to cleanse site, apply Biopatch to insertion site,       apply securement device dressing   - Posi-flow caps are changed weekly and after EVERY lab draw.   - If sterile gauze is under dressing to control oozing,                 dressing change must be performed every 24 hours until gauze is not needed.    CONSULTS:     to evaluate for community resources/long-range planning.     Aide to provide assistance with personal care, ADLs, and vital signs    Send initial Home Health orders to HTS attending physician on call.  Send follow up questions to [phone] or fax:               Heart Failure:      [phone]

## 2023-05-15 NOTE — CHAPLAIN
"Palliative Care     Patient: Hafsa Hawley       MRN: [id number]  : 1965  Age: 57 y.o.  Hospital Length of Stay: 4 days  Code Status: Full Code   Attending Provider: Bertha Lorenzo MD  Principal Problem: Acute on chronic combined systolic and diastolic heart failure    Present during Interview:  Visited [facility]; she was alone    Non-clinical observations:  Awake, alert, sitting up in bed, playing on her phone; flat and saddened affect    Facts (Spiritual Perception of Illness):   Family hx of heart attacks and CHF and many early/young deaths in her immediate family from these conditions; pt to be d/c soon.    Feelings (Emotions/Fears/Experiences/Coping):      Pt very quiet, withdrawn, saddened about what she's being told about no advanced options available to her.  Glassy/teary eyes throughout visit; poor eye contact. She hates the feeling of SOB and being tired; pt is used to being the caregiver for others and not needed care provided to/for her. Her family is what gives her hope and strength-- "I've always thought of myself as a strong person, but this is a lot to process." Reminded her to gives herself some suresh, space to process.Provided compassionate presences, pregnant pauses and tender touch, along with a listening ear. Pt appreciative.  Pt tends to do research on line re: treatments, medicines, etc.     Family/Friends (Support, Community, Culture):     Pt has two children, 5 grands and one grand [place]the[place]way. Seems to have a good family support system.    Damaris (Beliefs/Meaning/Philosophy):  Pt is Yarsanism, confirmed "Jain" as it says in Epic, but says she visits all Yarsanism denominations. Pt welcomed me to pray over/with/for her as we held hands.     Future (Spiritual Care Plan of Action):  Palliative  will continue to follow while admitted. Lord, in your mercy.    ** Spiritual Care Dept. Chaplains are available evenings, overnight and weekends **    In Peace,    Rev. " Krista Glass MDiv, [facility]  Board Certified [facility]  Palliative Medicine Department  [phone]

## 2023-05-15 NOTE — ASSESSMENT & PLAN NOTE
WASHINGTON on CKD stage 4 intially most likely secondary to hypervolemia. Baseline Cr around 1.5 (5/2/2023). Improving to 1.8 today while holding diuretics.     - will hold diuretic regimen and resume upon discharge with close follow up  - Avoid nephrotoxic agents.  - Will continue to monitor.

## 2023-05-15 NOTE — DISCHARGE SUMMARY
[facility] - Cardiology Stepdown  Heart Transplant  Discharge Summary      Patient Name: Hafsa Hawley  MRN: [id number]  Admission Date: 5/11/2023  Hospital Length of Stay: 4 days  Discharge Date and Time: 05/15/2023 10:50 AM  Attending Physician: Bertha Lorenzo MD   Discharging Provider: Trell Urias MD  Primary Care Provider: Primary Doctor No     HPI: 57 year old female with Stage D HF 2/2 NICM (since 2013), Pulmonary hypertension, DM, HTN, permanent AF and ICD, who presented today after she was referred from clinic to be admitted for palliative dobutamine. Patient was previously evaluated and presented to committee on 3/9/22.  At the time she was declined for OHT or LVAD due to renal function, lung function and difficulty consistently following up with the team as well as poor medical adherence. Patient was seen yesterday in clinic by Dr. Orozco. Concerns regarding medication non-compliance and/or misunderstanding was raised during the visit. Patients renal function has deteriorated since her last discharge. She reports being un Entresto  mg, BID; however, it was noted that her Entresto was reduced during that hospital course. Today patient reports occasional shortness of breath on exertion but this has been unchanged since her last discharge. Patient endorses 2 pillow orthopnea, has not required any more lately and does report frequent PND. Patient denies any fever, chills, nausea, vomiting, hematemesis, cough, chest pain, palpitations, shortness of breath, abdominal pain, changes in bowel or urinary habits, no hematochezia or melena.      * No surgery found *     Hospital Course: The patient was admitted for volume overload with WASHINGTON due to cardiorenal syndrome.  Initially improved with diuresis, and was started on a dobutamine infusion with improvement in clinical status.  Her baseline Cr believed to be 1.9.  She did not have any issues with shortness of breath or chest pain.  She had intermittent  episodes of lightheadedness but on the day of discharge tolerated her current GDMT regimen well without episodes of hypotension.  She was set up with home dobutamine infusion at 2.5mcg/kg/min with a plan to follow closely in clinic with weekly labs with home health.  Palliative consulted and discussed goals of care with the patient.  She was discharged in stable condition.  Dry weight is 84.6kg and Cr at baseline is 1.9.         Goals of Care Treatment Preferences:  Code Status: Full Code    Health care agent: Elba Estrada.  Health care agent number: No value filed.          What is most important right now is to focus on curative/life-prolongation (regardless of treatment burdens).  Accordingly, we have decided that the best plan to meet the patient's goals includes continuing with treatment.      Consults (From admission, onward)        Status Ordering Provider     Inpatient consult to Palliative Care  Once        Provider:  (Not yet assigned)    Completed CHANDLER RODRIGUEZ     Inpatient consult to PICC team ([facility]AS)  Once        Provider:  (Not yet assigned)    Completed CHANDLER RODRIGUEZ     Inpatient consult to Registered Dietitian/Nutritionist  Once        Provider:  (Not yet assigned)    Completed ASIM BREAUX          Significant Diagnostic Studies: Labs:   BMP:   Recent Labs   Lab 05/14/23 0436 05/14/23  1824 05/15/23  0317   GLU 97 142* 90    139 140   K 3.5 4.7 4.6    106 106   CO2 27 24 25   BUN 49* 52* 53*   CREATININE 1.8* 2.0* 1.9*   CALCIUM 9.2 9.5 9.3   MG 1.9  --  2.4   , CMP   Recent Labs   Lab 05/14/23  0436 05/14/23  1824 05/15/23  0317    139 140   K 3.5 4.7 4.6    106 106   CO2 27 24 25   GLU 97 142* 90   BUN 49* 52* 53*   CREATININE 1.8* 2.0* 1.9*   CALCIUM 9.2 9.5 9.3   PROT 5.9*  --  6.2   ALBUMIN 3.3*  --  3.4*   BILITOT 1.6*  --  1.6*   ALKPHOS 55  --  55   AST 19  --  19   ALT 15  --  16   ANIONGAP 11 9 9   , CBC   Recent Labs   Lab 05/14/23 0436  05/15/23  0317   WBC 3.71* 3.38*   HGB 10.6* 10.1*   HCT 36.3* 35.3*    147*   , INR   Lab Results   Component Value Date    INR 1.0 05/02/2023    INR 1.0 05/01/2023    INR 1.0 04/30/2023   , Lipid Panel   Lab Results   Component Value Date    CHOL 136 01/04/2023    HDL 63 01/04/2023    LDLCALC 57.8 (L) 01/04/2023    TRIG 76 01/04/2023    CHOLHDL 46.3 01/04/2023    and Troponin No results for input(s): TROPONINI in the last 168 hours.    Pending Diagnostic Studies:     None        Final Active Diagnoses:    Diagnosis Date Noted POA    PRINCIPAL PROBLEM:  Acute on chronic combined systolic and diastolic heart failure [I50.43] 11/30/2021 Yes    Palliative care encounter [Z51.5] 05/15/2023 Not Applicable    ACC/AHA stage D systolic heart failure [I50.20] 05/15/2023 Unknown    Debility [R53.81] 05/15/2023 Unknown    Dyspnea [R06.00] 05/15/2023 Unknown    Advance care planning [Z71.89] 05/15/2023 Not Applicable    Pulmonary hypertension [I27.20] 02/22/2023 Yes    Restrictive lung disease [J98.4] 04/26/2022 Yes    WASHINGTON (acute kidney injury) [N17.9] 10/28/2021 Unknown    Type 2 diabetes mellitus with stage 4 chronic kidney disease, with long-term current use of insulin [E11.22, N18.4, Z79.4] 08/13/2020 Not Applicable      Problems Resolved During this Admission:      Discharged Condition: stable    Disposition: Home or Self Care    Follow Up:    Patient Instructions:      Ambulatory referral/consult to Outpatient Infusion and Home Infusion   Standing Status: Future   Referral Priority: Routine Referral Type: Psychiatric   Referral Reason: Specialty Services Required   Requested Specialty: Behavioral Health   Number of Visits Requested: 1     Medications:  Reconciled Home Medications:      Medication List      START taking these medications    isosorbide-hydrALAZINE 20-37.5 mg 20-37.5 mg Tab  Commonly known as: BIDIL  Take 1 tablet by mouth 3 (three) times daily.     sacubitriL-valsartan 49-51 mg per  tablet  Commonly known as: ENTRESTO  Take 1 tablet by mouth 2 (two) times daily.  Replaces: ENTRESTO  mg per tablet        CHANGE how you take these medications    bumetanide 2 MG tablet  Commonly known as: BUMEX  Take 2mg in the morning and 1mg in the evening  What changed:   · how much to take  · how to take this  · when to take this  · additional instructions        CONTINUE taking these medications    albuterol-ipratropium 2.5 mg-0.5 mg/3 mL nebulizer solution  Commonly known as: DUO-NEB  Take 3 mLs by nebulization 2 (two) times a day.     allopurinoL 100 MG tablet  Commonly known as: ZYLOPRIM  Take 1 tablet (100 mg total) by mouth daily as needed (gout attack).     ALPRAZolam 2 MG Tab  Commonly known as: XANAX  Take 2 mg by mouth 2 (two) times daily.     amiodarone 200 MG Tab  Commonly known as: PACERONE  Take 200 mg by mouth.     atorvastatin 40 MG tablet  Commonly known as: LIPITOR  Take 1 tablet (40 mg total) by mouth every evening.     b complex vitamins tablet  Take 1 tablet by mouth once daily.     blood sugar diagnostic Strp  Commonly known as: ACCU-CHEK GUIDE TEST STRIPS  1 strip by Other route 3 (three) times daily.     cetirizine 10 MG tablet  Commonly known as: ZYRTEC  Take 10 mg by mouth daily as needed for Allergies.     diclofenac sodium 1 % Gel  Commonly known as: VOLTAREN  APPLY 2 GRAMS TOPICALLY 3 (THREE) TIMES DAILY AS NEEDED (PAIN).     DULERA 200-5 mcg/actuation inhaler  Generic drug: mometasone-formoterol  Inhale 2 puffs into the lungs 2 (two) times daily. Controller     ELIQUIS 5 mg Tab  Generic drug: apixaban  TAKE 1 TABLET (5 MG TOTAL) BY MOUTH 2 (TWO) TIMES DAILY.     empagliflozin 10 mg tablet  Commonly known as: JARDIANCE  Take 1 tablet (10 mg total) by mouth once daily.     ergocalciferol 50,000 unit Cap  Commonly known as: ERGOCALCIFEROL  Take 1 capsule (50,000 Units total) by mouth every Saturday.     EScitalopram oxalate 5 MG Tab  Commonly known as: LEXAPRO  Take 1 tablet (5  mg total) by mouth once daily.     fluticasone propionate 50 mcg/actuation nasal spray  Commonly known as: FLONASE  2 sprays by Each Nostril route daily as needed for Rhinitis.     lancets Misc  Commonly known as: ACCU-CHEK SOFTCLIX LANCETS  1 Device by Misc.(Non-Drug; Combo Route) route 3 (three) times daily.     LIDOcaine 5 %  Commonly known as: LIDODERM  Place 1 patch onto the skin daily as needed (pain). Remove & Discard patch within 12 hours or as directed by MD     mirtazapine 7.5 MG Tab  Commonly known as: REMERON  Take 1 tablet (7.5 mg total) by mouth every evening.     ondansetron 4 MG Tbdl  Commonly known as: ZOFRAN-ODT  Take 1 tablet (4 mg total) by mouth 2 (two) times daily.     pantoprazole 40 MG tablet  Commonly known as: PROTONIX  TAKE 1 TABLET BY MOUTH DAILY IN THE MORNING     pregabalin 50 MG capsule  Commonly known as: LYRICA  Take 1 capsule (50 mg total) by mouth 2 (two) times daily.     semaglutide 0.25 mg or 0.5 mg (2 mg/3 mL) pen injector  Commonly known as: OZEMPIC  Inject 0.5 mg into the skin every 7 days.     SPIRIVA WITH HANDIHALER 18 mcg inhalation capsule  Generic drug: tiotropium  Inhale 1 capsule (18 mcg total) into the lungs once daily.     traZODone 50 MG tablet  Commonly known as: DESYREL  Take 25 mg by mouth nightly as needed.     VENTOLIN HFA 90 mcg/actuation inhaler  Generic drug: albuterol  Inhale 2 puffs into the lungs every 6 (six) hours as needed for Shortness of Breath or Wheezing.        STOP taking these medications    ENTRESTO  mg per tablet  Generic drug: sacubitriL-valsartan  Replaced by: sacubitriL-valsartan 49-51 mg per tablet     hydrALAZINE 25 MG tablet  Commonly known as: APRESOLINE     isosorbide dinitrate 30 MG Tab  Commonly known as: ISORDIL     metoprolol succinate 100 MG 24 hr tablet  Commonly known as: TOPROL-XL     potassium chloride 10 MEQ Cpsr  Commonly known as: MICRO-K     promethazine-codeine 6.25-10 mg/5 ml 6.25-10 mg/5 mL syrup  Commonly known  as: PHENERGAN with CODEINE            Trell Urias MD  Heart Transplant  [facility] - [facility]iology Stepdown

## 2023-05-15 NOTE — ASSESSMENT & PLAN NOTE
"Impression: Pt is a 58 y/o female with advanced heart failure. Pt is not a candidate for advanced options. Pt started on Dobutamine. Pt is on O2 per [place]. Pt is alert, oriented to person, place, time, and situation.     Reason for consult: ACP/GOC     Goals of care/ACP:     Met with pt who is aware of her heart issues. Per pt, she is not a candidate for advanced options. Per pt she was having trouble with getting her meds from er pharmacy in [place] and she feels this was a barrier.   Spoke to pt about her plan of care. Education done with pt on palliative intent of Dobutamine. She verbalized understanding and is aware Dobutamine will not "cure" her heart disease.     Pt open to following up with palliative care in clinic.     MPOA: Erika Estrada.     Per pt her  lives at her house and they had been  for a while.     Symptom management:     Dyspnea:   Pt is on O2 per NC.   Pt is on Dobutamine. Pt to d/c with home based Dobutamine.     Debility r/t to heart failure and flare up of Gout.   Continue to work with PT/OT.     Plan:   Pt to d/c home with home health.   Will schedule clinic  F/u appt which may have to be virtual per pt.         "

## 2023-05-15 NOTE — SUBJECTIVE & OBJECTIVE
Interval History: Pt to d/c home with home health and Dobutamine.     Past Medical History:   Diagnosis Date    Anemia     Arthritis     Atrial fibrillation     OAC    Chronic respiratory failure with hypoxia, on home oxygen therapy     2L with activity, off at rest.  Per Pulm  no overt evidence of ILD or COPD on PFTs and CT to explain O2 needs.    CKD (chronic kidney disease), stage IV 05/08/2018    Congestive heart failure     s/p AICD placement,    Deep vein thrombosis     Depression     elevated bilirubin d/t Gilbert's syndrome     confirmed by [facility] genetic testing, evaluated by hepatology    Encounter for blood transfusion     GERD (gastroesophageal reflux disease)     Hypertension     Pheochromocytoma, malignant     Right kidney mass     Sleep apnea     Thalassemia trait, alpha     Thyroid disease     Type 2 diabetes mellitus with hyperglycemia, without long-term current use of insulin 08/13/2020       Past Surgical History:   Procedure Laterality Date    APPENDECTOMY      BONE MARROW BIOPSY      CARDIAC DEFIBRILLATOR PLACEMENT Left 12/2016    CARDIAC ELECTROPHYSIOLOGY MAPPING AND ABLATION      CARDIAC ELECTROPHYSIOLOGY MAPPING AND ABLATION      COLONOSCOPY N/A 5/6/2022    Procedure: COLONOSCOPY;  Surgeon: Arely Betancourt MD;  Location: [facility] ([address] [place] [address]);  Service: Endoscopy;  Laterality: N/A;  heart transplant candidate/ EF 25% - 2nd floor/ defib - Biotronik - [facility]  Eliquis - per Dr. Cortez with [facility], Pt ok to hold Eliquis x 2 days prior-see media tab-outside correspondence dated 12/30/21  - [facility]  verbal instructions/portal instructions/email instructions - s    EYE SURGERY      due to running tears    FRACTURE SURGERY Left     hand 5th digit    HYSTERECTOMY      KNEE SURGERY Left 2016    hematoma    LIVER BIOPSY  10/24/2018    Minimal steatosis, predominantly macrovesicular, 1%, Minimal nonspecific portal inflammation, no fibrosis. No findings on biopsy to explain elevated bilirubin levels. Could be  "d/t Gilbert's =?- hemolysis    RIGHT HEART CATHETERIZATION Right 12/07/2021    Procedure: INSERTION, CATHETER, RIGHT HEART;  Surgeon: Irving Cardenas MD;  Location: [facility] CATH LAB;  Service: Cardiology;  Laterality: Right;    RIGHT HEART CATHETERIZATION Right 12/19/2022    Procedure: INSERTION, CATHETER, RIGHT HEART;  Surgeon: Burke Camilo MD;  Location: [facility] CATH LAB;  Service: Cardiology;  Laterality: Right;    RIGHT HEART CATHETERIZATION Right 3/29/2023    Procedure: INSERTION, CATHETER, RIGHT HEART;  Surgeon: Katie Liriano DO;  Location: [facility] CATH LAB;  Service: Cardiology;  Laterality: Right;    TRANSJUGULAR BIOPSY OF LIVER N/A 10/24/2018    Procedure: BIOPSY, LIVER, TRANSJUGULAR APPROACH;  [facility];  Location: [facility] OR [address] [place] [address];  Service: Radiology;  Laterality: N/A;       Review of patient's allergies indicates:   Allergen Reactions    Penicillins Hives and Other (See Comments)    Iodinated contrast media Nausea And Vomiting    Oxycodone-acetaminophen Other (See Comments)     Nausea, Dizziness, Anxiety.  "I don't like how it makes me feel."   Given Hydromorphone 0.5mg IVP  Without problems.  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 Metabolism of other drugs not listed here may also be impacted by UGT1A1 enzyme activity.       Tramadol Nausea And Vomiting and Other (See Comments)     Other reaction(s): Other (See Comments)    " Valsartan Other (See Comments)       Medications:  Continuous Infusions:   DOBUTamine IV infusion (non-titrating) 2.5 mcg/kg/min (05/12/23 1651)     Scheduled Meds:   amiodarone  200 mg Oral Daily    apixaban  5 mg Oral BID    atorvastatin  40 mg Oral QHS    EScitalopram oxalate  5 mg Oral Daily    hydrALAZINE-hydrALAZINE-isorsorbide dinitrate (BIDIL 20/37.5) combination 1 tablet   Oral TID    isosorbide dinitrate  10 mg Oral TID    mupirocin   Nasal BID    pantoprazole  40 mg Oral Daily    sacubitriL-valsartan  1 tablet Oral BID    sodium chloride 0.9%  10 mL Intravenous Q6H     PRN Meds:dextrose 10%, dextrose 10%, dextrose, dextrose, diclofenac sodium, glucagon (human recombinant), insulin aspart U-100, sodium chloride 0.9%, Flushing PICC Protocol **AND** sodium chloride 0.9% **AND** sodium chloride 0.9%    Family History       Problem Relation (Age of Onset)    Cancer Mother    Cirrhosis Brother    Diabetes Brother    Heart attack Father    Heart disease Father, Sister, Brother, Sister, Brother    Hypertension Father, Brother          Tobacco Use    Smoking status: Never    Smokeless tobacco: Never   Substance and Sexual Activity    Alcohol use: Yes     Comment: up to 1 yr ago drank 2-3 drinks on occasion but sporadic    Drug use: No    Sexual activity: Yes     Partners: Male       Review of Systems   Constitutional:  Positive for activity change and fatigue.   Respiratory:  Negative for shortness of breath.    Cardiovascular:  Negative for chest pain.   Neurological:  Positive for weakness.   Psychiatric/Behavioral: Negative.     Objective:     Vital Signs (Most Recent):  Temp: 98.1 °F (36.7 °C) (05/15/23 0750)  Pulse: 83 (05/15/23 0750)  Resp: 20 (05/15/23 0750)  BP: 125/73 (05/15/23 0750)  SpO2: 98 % (05/15/23 0750) Vital Signs (24h Range):  Temp:  [97.7 °F (36.5 °C)-98.7 °F (37.1 °C)] 98.1 °F (36.7 °C)  Pulse:  [76-92] 83  Resp:  [16-20] 20  SpO2:  [92 %-98 %] 98 %  BP: ()/(61-76) 125/73     Weight:  84.6 kg (186 lb 8.2 oz)  Body mass index is 30.1 kg/m².       Physical Exam  Constitutional:       Interventions: Nasal cannula in place.   HENT:      Head: Normocephalic and atraumatic.   Eyes:      General: Lids are normal.      Conjunctiva/sclera: Conjunctivae normal.   Cardiovascular:      Comments: Pt on Dobutamine.   Pulmonary:      Effort: Pulmonary effort is normal. No respiratory distress.   Musculoskeletal:      Cervical back: Full passive range of motion without pain and normal range of motion.      Comments: Normal ROM   Skin:     General: Skin is warm and dry.   Neurological:      Mental Status: She is alert and oriented to person, place, and time.   Psychiatric:         Attention and Perception: Attention normal.         Mood and Affect: Mood normal.         Speech: Speech normal.         Behavior: Behavior is cooperative.          Review of Symptoms      Symptom Assessment (ESAS 0-10 Scale)  Pain:  0  Dyspnea:  0  Anxiety:  0  Nausea:  0  Depression:  0  Anorexia:  0  Fatigue:  0  Insomnia:  0  Restlessness:  0  Agitation:  0         Psychosocial/Cultural:   See Palliative Psychosocial Note: No  Pt lives in [place]. Per pt her  who she had been  from and her granddaughter live at the house with her.   **Primary  to Follow**  Palliative Care  Consult: No      Advance Care Planning   Advance Directives:   Medical Power of : Yes    Agent's Name:  Elba Estrada.    Decision Making:  Patient answered questions  Goals of Care: What is most important right now is to focus on curative/life-prolongation (regardless of treatment burdens). Accordingly, we have decided that the best plan to meet the patient's goals includes continuing with treatment.       Significant Labs: All pertinent labs within the past 24 hours have been reviewed.  CBC:   Recent Labs   Lab 05/15/23  0317   WBC 3.38*   HGB 10.1*   HCT 35.3*   MCV 63*   *     BMP:  Recent Labs    Lab 05/15/23  0317   GLU 90      K 4.6      CO2 25   BUN 53*   CREATININE 1.9*   CALCIUM 9.3   MG 2.4     LFT:  Lab Results   Component Value Date    AST 19 05/15/2023    ALKPHOS 55 05/15/2023    BILITOT 1.6 (H) 05/15/2023     Albumin:   Albumin   Date Value Ref Range Status   05/15/2023 3.4 (L) 3.5 - 5.2 g/dL Final     Protein:   Total Protein   Date Value Ref Range Status   05/15/2023 6.2 6.0 - 8.4 g/dL Final     Lactic acid:   Lab Results   Component Value Date    LACTATE 2.1 04/26/2023    LACTATE 0.6 01/04/2023       Significant Imaging: I have reviewed all pertinent imaging results/findings within the past 24 hours.

## 2023-05-15 NOTE — PROGRESS NOTES
"[facility]  Heart Transplant  Progress Note    Patient Name: Hafsa Hawley  MRN: [id number]  Admission Date: 5/11/2023  Hospital Length of Stay: 4 days  Attending Physician: Bertha Lorenzo MD  Primary Care Provider: Primary Doctor No  Principal Problem:Acute on chronic combined systolic and diastolic heart failure    Subjective:     Interval History: No acute complaints overnight, no issues with shortness of breath or chest pain.  Doing well on  2.5.  Not currently on diuretics.  Clinically stable for discharge today pending home  setup.      I: 660  O: 1300  Net: -640  No diuretics    HD on  2.5  SVO2 62  CO 5.71  CI 2.88    Continuous Infusions:   DOBUTamine IV infusion (non-titrating) 2.5 mcg/kg/min (05/12/23 1651)     Scheduled Meds:   amiodarone  200 mg Oral Daily    apixaban  5 mg Oral BID    atorvastatin  40 mg Oral QHS    EScitalopram oxalate  5 mg Oral Daily    hydrALAZINE-hydrALAZINE-isorsorbide dinitrate (BIDIL 20/37.5) combination 1 tablet   Oral TID    isosorbide dinitrate  10 mg Oral TID    mupirocin   Nasal BID    pantoprazole  40 mg Oral Daily    sacubitriL-valsartan  1 tablet Oral BID    sodium chloride 0.9%  10 mL Intravenous Q6H     PRN Meds:dextrose 10%, dextrose 10%, dextrose, dextrose, diclofenac sodium, glucagon (human recombinant), insulin aspart U-100, sodium chloride 0.9%, Flushing PICC Protocol **AND** sodium chloride 0.9% **AND** sodium chloride 0.9%    Review of patient's allergies indicates:   Allergen Reactions    Penicillins Hives and Other (See Comments)    Iodinated contrast media Nausea And Vomiting    Oxycodone-acetaminophen Other (See Comments)     Nausea, Dizziness, Anxiety.  "I don't like how it makes me feel."   Given Hydromorphone 0.5mg IVP  Without problems.  Other reaction(s): Other (See Comments)    Clonazepam Other (See Comments)    Diovan hct [valsartan-hydrochlorothiazide] Other (See Comments)     Causes coughing    Iodine " Other (See Comments)    Irinotecan      Pt has homozygosity for the TA7 promoter variant that places this individual at significantly increased risk for   severe neutropenia (grade 4) when treated with the standard dose of irinotecan (risk approximately 50%).   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 Metabolism of other drugs not listed here may also be impacted by UGT1A1 enzyme activity.       Tramadol Nausea And Vomiting and Other (See Comments)     Other reaction(s): Other (See Comments)    Valsartan Other (See Comments)     Objective:     Vital Signs (Most Recent):  Temp: 98.1 °F (36.7 °C) (05/15/23 0750)  Pulse: 83 (05/15/23 0750)  Resp: 20 (05/15/23 0750)  BP: 125/73 (05/15/23 0750)  SpO2: 98 % (05/15/23 0750) Vital Signs (24h Range):  Temp:  [97.7 °F (36.5 °C)-98.7 °F (37.1 °C)] 98.1 °F (36.7 °C)  Pulse:  [76-92] 83  Resp:  [16-20] 20  SpO2:  [92 %-98 %] 98 %  BP: ()/(61-76) 125/73     Patient Vitals for the past 72 hrs (Last 3 readings):   Weight   05/15/23 0547 84.6 kg (186 lb 8.2 oz)   05/14/23 0440 83.7 kg (184 lb 8.4 oz)   05/13/23 0538 83.6 kg (184 lb 4.9 oz)     Body mass index is 30.1 kg/m².      Intake/Output Summary (Last 24 hours) at 5/15/2023 1005  Last data filed at 5/14/2023 1722  Gross per 24 hour   Intake 180 ml   Output 400 ml   Net -220 ml        Physical Exam  Constitutional:       Appearance: She is obese.      Comments: Alert, Oriented, No acute distress   HENT:      Head: Normocephalic and atraumatic.   Eyes:      Conjunctiva/sclera: Conjunctivae normal.   Neck:      Comments: JVP 8cm of H20 at 45 degrees.   Cardiovascular:      Rate and Rhythm: Normal rate and regular rhythm.   Pulmonary:      Comments: Normal effort, Clear to auscultation   Abdominal:      Comments: Soft, Non-tender, Non-distended   Musculoskeletal:      Cervical back: Normal range of  motion.      Comments: No peripheral edema   Skin:     Comments: Dry, Intact, Warm, Capillary refill <2 seconds.    Neurological:      Mental Status: She is alert and oriented to person, place, and time.      Comments: Normal speech   Psychiatric:         Mood and Affect: Mood and affect normal.          Significant Labs:  CBC:  Recent Labs   Lab 05/13/23  0504 05/14/23  0436 05/15/23  0317   WBC 3.42* 3.71* 3.38*   RBC 5.85* 5.95* 5.59*   HGB 10.6* 10.6* 10.1*   HCT 37.3 36.3* 35.3*    156 147*   MCV 64* 61* 63*   MCH 18.1* 17.8* 18.1*   MCHC 28.4* 29.2* 28.6*     BNP:  Recent Labs   Lab 05/11/23  1735   BNP 3,087*     CMP:  Recent Labs   Lab 05/13/23  0504 05/13/23  1743 05/14/23 0436 05/14/23  1824 05/15/23  0317   GLU 80   < > 97 142* 90   CALCIUM 9.0   < > 9.2 9.5 9.3   ALBUMIN 3.4*  --  3.3*  --  3.4*   PROT 6.3  --  5.9*  --  6.2      < > 142 139 140   K 4.4   < > 3.5 4.7 4.6   CO2 26   < > 27 24 25      < > 104 106 106   BUN 53*   < > 49* 52* 53*   CREATININE 2.1*   < > 1.8* 2.0* 1.9*   ALKPHOS 52*  --  55  --  55   ALT 16  --  15  --  16   AST 26  --  19  --  19   BILITOT 1.6*  --  1.6*  --  1.6*    < > = values in this interval not displayed.      Coagulation:   No results for input(s): PT, INR, APTT in the last 168 hours.  LDH:  No results for input(s): LDH in the last 72 hours.  Microbiology:  Microbiology Results (last 7 days)       ** No results found for the last 168 hours. **            I have reviewed all pertinent labs within the past 24 hours.    Estimated Creatinine Clearance: 35.8 mL/min (A) (based on SCr of 1.9 mg/dL (H)).    Diagnostic Results:  I have reviewed all pertinent imaging results/findings within the past 24 hours.    Assessment and Plan:     57 year old female with Stage D HF 2/2 NICM (since 2013), Pulmonary hypertension, DM, HTN, permanent AF and ICD, who presented today after she was referred from clinic to be admitted for palliative dobutamine. Patient was  previously evaluated and presented to committee on 3/9/22.  At the time she was declined for OHT or LVAD due to renal function, lung function and difficulty consistently following up with the team as well as poor medical adherence. Patient was seen yesterday in clinic by Dr. Orozco. Concerns regarding medication non-compliance and/or misunderstanding was raised during the visit. Patients renal function has deteriorated since her last discharge. She reports being un Entresto  mg, BID; however, it was noted that her Entresto was reduced during that hospital course. Today patient reports occasional shortness of breath on exertion but this has been unchanged since her last discharge. Patient endorses 2 pillow orthopnea, has not required any more lately and does report frequent PND. Patient denies any fever, chills, nausea, vomiting, hematemesis, cough, chest pain, palpitations, shortness of breath, abdominal pain, changes in bowel or urinary habits, no hematochezia or melena.      * Acute on chronic combined systolic and diastolic heart failure  - NICM, ACC/AHA Stage D heart failure  - Last RHC (3/29/2023): RA 9, PCWP 16, PA 58/29/39, PVR 5.6 BOLDEN, CO/CI 4.1/2.03, SVR 1151, Jayson 3  - Last Echo (3/27/2023): EF 15%. LV severely enlarged w/ eccentric hypertrophy and severely decreased systolic function. Grade 2 LV diastolic dysfunction. Normal RV size and moderately reduced systolic function. Biatrial enlargment. Mild MR. Small pericardial effusion. PA sys pressure 51mmHg.   - Home GDMT: Jardiance, Hydralaine 25mg tid, Isordil 30mg tid, Entresto 46mg bid, and toprolol 100mg daily.   - Home diuretic regimen: Bumex 4mg bid  - Euvolemic on exam today. Will increase hydralazine to 37.5 tid and continue isordil 30mg tid and entresto 46/51bid.   - Will discharge today on bumex 2mg qAM and 1mg qPM for maintenance diuretics as outpatient  - Ionotropes: Continue  2.5 for palliative purposes, pending setup per case  management and insurance  - Palliative care consulted.   - Strict I&Os and daily weights.   - Maintain K>4 and Mg> 2.     ACC/AHA stage D systolic heart failure  See Acute on chronic combined systolic and diastolic heart failure    CKD (chronic kidney disease), stage IV  - Cr. 2.6 yesterday down to 2.2 today  - Baseline renal function appears to be around 1.9-2.2  - Worsening renal function may have been in the setting of high dose Entresto  - Monitor renal function  - Avoid Nephrotoxic agents  - Monitor urine output     Restrictive lung disease  - PFTs 03/15/2023 demonstrates severe restriction and severely reduced MVV there is also the possibility of mild obstruction per report.  - Continue with oxygen supplementation as needed    [place] (acute kidney injury)  [place] on CKD stage 4 intially most likely secondary to hypervolemia. Baseline Cr around 1.5 (5/2/2023). Improving to 1.8 today while holding diuretics.     - will hold diuretic regimen and resume upon discharge with close follow up  - Avoid nephrotoxic agents.  - Will continue to monitor.     Type 2 diabetes mellitus with stage 4 chronic kidney disease, with long-term current use of insulin  - Most recent A1c: 5.9  - Hold Jardiance (Also given CrCl)  - FS + LSS        Trell Urias MD  Heart Transplant  [facility]

## 2023-05-16 ENCOUNTER — PATIENT OUTREACH (OUTPATIENT)
Dept: ADMINISTRATIVE | Facility: CLINIC | Age: 58
End: 2023-05-16
Payer: MEDICAID

## 2023-05-16 NOTE — PROGRESS NOTES
C3 nurse attempted to contact Hafsa Hawley for a TCC post hospital discharge follow up call. No answer. Left voicemail with callback information. The patient has a scheduled HOSFU appointment with Sandra Hernandes MD on 5/24/23 @ 0900.

## 2023-05-17 NOTE — PROGRESS NOTES
C3 nurse 2nd attempt to contact Hafsa Hawley for a TCC post hospital discharge follow up call. No answer. Left voicemail with callback information. The patient has a scheduled HOSFU appointment with Snadra Hernandes MD on 5/24/23 @ 0900.

## 2023-05-19 ENCOUNTER — INFUSION (OUTPATIENT)
Dept: INFUSION THERAPY | Facility: HOSPITAL | Age: 58
End: 2023-05-19
Attending: INTERNAL MEDICINE
Payer: MEDICAID

## 2023-05-19 VITALS
HEART RATE: 89 BPM | DIASTOLIC BLOOD PRESSURE: 68 MMHG | RESPIRATION RATE: 20 BRPM | SYSTOLIC BLOOD PRESSURE: 107 MMHG | TEMPERATURE: 97 F

## 2023-05-19 NOTE — PROGRESS NOTES
0900  Right upper arm PICC line dressing change done using sterile technique   No signs of infection noted  Biopatch, stat lock device and tegaderm dressing applied.  Tolerated well.

## 2023-05-22 ENCOUNTER — INFUSION (OUTPATIENT)
Dept: INFUSION THERAPY | Facility: HOSPITAL | Age: 58
End: 2023-05-22
Attending: INTERNAL MEDICINE
Payer: MEDICAID

## 2023-05-22 VITALS
TEMPERATURE: 98 F | HEART RATE: 89 BPM | DIASTOLIC BLOOD PRESSURE: 76 MMHG | SYSTOLIC BLOOD PRESSURE: 120 MMHG | RESPIRATION RATE: 18 BRPM

## 2023-05-22 DIAGNOSIS — I42.9 CARDIOMYOPATHY, UNSPECIFIED TYPE: Primary | ICD-10-CM

## 2023-05-22 PROCEDURE — 63600175 PHARM REV CODE 636 W HCPCS: Performed by: INTERNAL MEDICINE

## 2023-05-22 PROCEDURE — 36591 DRAW BLOOD OFF VENOUS DEVICE: CPT

## 2023-05-22 RX ORDER — HEPARIN 100 UNIT/ML
5 SYRINGE INTRAVENOUS ONCE
Status: COMPLETED | OUTPATIENT
Start: 2023-05-22 | End: 2023-05-22

## 2023-05-22 RX ADMIN — HEPARIN 500 UNITS: 100 SYRINGE at 10:05

## 2023-05-22 NOTE — PROGRESS NOTES
1020  Right upper arm PICC line dressing changed using sterile technique.  No signs of infection noted.  Bio patch, stat lock device and tegaderm dressing reapplied  tolerated well   dc to home in stable condition

## 2023-05-23 DIAGNOSIS — G25.81 RLS (RESTLESS LEGS SYNDROME): ICD-10-CM

## 2023-05-23 DIAGNOSIS — M51.36 LUMBAR DEGENERATIVE DISC DISEASE: ICD-10-CM

## 2023-05-23 RX ORDER — LIDOCAINE 50 MG/G
1 PATCH TOPICAL DAILY PRN
Qty: 30 PATCH | Refills: 5 | Status: SHIPPED | OUTPATIENT
Start: 2023-05-23 | End: 2023-05-25 | Stop reason: SDUPTHER

## 2023-05-23 RX ORDER — ALBUTEROL SULFATE 90 UG/1
2 AEROSOL, METERED RESPIRATORY (INHALATION) EVERY 6 HOURS PRN
Qty: 18 G | Refills: 11 | Status: SHIPPED | OUTPATIENT
Start: 2023-05-23 | End: 2023-10-10 | Stop reason: SDUPTHER

## 2023-05-23 RX ORDER — ROPINIROLE 4 MG/1
4 TABLET, FILM COATED ORAL DAILY
Qty: 90 TABLET | Refills: 1 | Status: SHIPPED | OUTPATIENT
Start: 2023-05-23 | End: 2023-10-27

## 2023-05-23 NOTE — TELEPHONE ENCOUNTER
Refill Routing Note   Medication(s) are not appropriate for processing by [facility] for the following reason(s):      Medication outside of protocol  Responsible provider unclear    [facility] action(s):  Route None identified          Appointments  past 12m or future 3m with PCP    Date Provider   Last Visit   5/3/2023 Cristobal Ann MD   Next Visit   6/6/2023 Cristobal Ann MD   ED visits in past 90 days: 5        Note composed:11:43 AM 05/23/2023

## 2023-05-23 NOTE — TELEPHONE ENCOUNTER
----- Message from Lourdes Oquendo sent at 2023  1:45 PM CDT -----  Contact: self  Hafsa Hawley  MRN: [id number]  : 1965  PCP: Primary Doctor No  Home Phone      [phone]  Work Phone      Not on file.  Mobile          [phone]  Home Phone      [phone]      MESSAGE:   Pt called asking if provider can forward script for her LIDOcaine (LIDODERM) 5 % to [facility] in [place].    Also asked if provider can refill her VENTOLIN HFA 90 mcg/actuation inhaler AND asked if she can get a refill of her Norco but increased to 7.5.  Please advise    Pharmacy:  [facility]    Phone:  [phone]

## 2023-05-24 ENCOUNTER — OFFICE VISIT (OUTPATIENT)
Dept: PALLIATIVE MEDICINE | Facility: CLINIC | Age: 58
End: 2023-05-24
Payer: MEDICAID

## 2023-05-24 DIAGNOSIS — F41.9 ANXIETY: ICD-10-CM

## 2023-05-24 DIAGNOSIS — R06.00 DYSPNEA, UNSPECIFIED TYPE: Primary | ICD-10-CM

## 2023-05-24 DIAGNOSIS — R52 PAIN: ICD-10-CM

## 2023-05-24 DIAGNOSIS — R11.0 NAUSEA: ICD-10-CM

## 2023-05-24 PROCEDURE — 99215 PR OFFICE/OUTPT VISIT, EST, LEVL V, 40-54 MIN: ICD-10-PCS | Mod: 95,,, | Performed by: STUDENT IN AN ORGANIZED HEALTH CARE EDUCATION/TRAINING PROGRAM

## 2023-05-24 PROCEDURE — 1111F DSCHRG MED/CURRENT MED MERGE: CPT | Mod: CPTII,95,, | Performed by: STUDENT IN AN ORGANIZED HEALTH CARE EDUCATION/TRAINING PROGRAM

## 2023-05-24 PROCEDURE — 4010F ACE/ARB THERAPY RXD/TAKEN: CPT | Mod: CPTII,95,, | Performed by: STUDENT IN AN ORGANIZED HEALTH CARE EDUCATION/TRAINING PROGRAM

## 2023-05-24 PROCEDURE — 1111F PR DISCHARGE MEDS RECONCILED W/ CURRENT OUTPATIENT MED LIST: ICD-10-PCS | Mod: CPTII,95,, | Performed by: STUDENT IN AN ORGANIZED HEALTH CARE EDUCATION/TRAINING PROGRAM

## 2023-05-24 PROCEDURE — 99497 PR ADVNCD CARE PLAN 30 MIN: ICD-10-PCS | Mod: 95,,, | Performed by: STUDENT IN AN ORGANIZED HEALTH CARE EDUCATION/TRAINING PROGRAM

## 2023-05-24 PROCEDURE — 4010F PR ACE/ARB THEARPY RXD/TAKEN: ICD-10-PCS | Mod: CPTII,95,, | Performed by: STUDENT IN AN ORGANIZED HEALTH CARE EDUCATION/TRAINING PROGRAM

## 2023-05-24 PROCEDURE — 3044F HG A1C LEVEL LT 7.0%: CPT | Mod: CPTII,95,, | Performed by: STUDENT IN AN ORGANIZED HEALTH CARE EDUCATION/TRAINING PROGRAM

## 2023-05-24 PROCEDURE — 99497 ADVNCD CARE PLAN 30 MIN: CPT | Mod: 95,,, | Performed by: STUDENT IN AN ORGANIZED HEALTH CARE EDUCATION/TRAINING PROGRAM

## 2023-05-24 PROCEDURE — 99215 OFFICE O/P EST HI 40 MIN: CPT | Mod: 95,,, | Performed by: STUDENT IN AN ORGANIZED HEALTH CARE EDUCATION/TRAINING PROGRAM

## 2023-05-24 PROCEDURE — 3044F PR MOST RECENT HEMOGLOBIN A1C LEVEL <7.0%: ICD-10-PCS | Mod: CPTII,95,, | Performed by: STUDENT IN AN ORGANIZED HEALTH CARE EDUCATION/TRAINING PROGRAM

## 2023-05-24 RX ORDER — HYDROCODONE BITARTRATE AND ACETAMINOPHEN 7.5; 325 MG/1; MG/1
1 TABLET ORAL EVERY 8 HOURS PRN
Qty: 90 TABLET | Refills: 0 | Status: SHIPPED | OUTPATIENT
Start: 2023-05-24 | End: 2023-05-25 | Stop reason: SDUPTHER

## 2023-05-24 RX ORDER — ONDANSETRON 8 MG/1
8 TABLET, ORALLY DISINTEGRATING ORAL EVERY 12 HOURS PRN
Qty: 60 TABLET | Refills: 1 | Status: ON HOLD | OUTPATIENT
Start: 2023-05-24 | End: 2023-06-30 | Stop reason: HOSPADM

## 2023-05-24 RX ORDER — ESCITALOPRAM OXALATE 10 MG/1
10 TABLET ORAL DAILY
Qty: 30 TABLET | Refills: 2 | Status: ON HOLD | OUTPATIENT
Start: 2023-05-24 | End: 2023-06-30 | Stop reason: HOSPADM

## 2023-05-24 NOTE — PROGRESS NOTES
Atology    Palliative Medicine Clinic Note      Consult Requested By: No ref. provider found    Primary Care Physician:   Primary Doctor No    Reason for Consult: Advance care planning and symptom management in the setting of heart Failure     The patient location is: [facility]  The chief complaint leading to consultation is: pain    Visit type: audiovisual    Face to Face time with patient: 30min  56 minutes of total time spent on the encounter, which includes face to face time and non-face to face time preparing to see the patient (eg, review of tests), Obtaining and/or reviewing separately obtained history, Documenting clinical information in the electronic or other health record, Independently interpreting results (not separately reported) and communicating results to the patient/family/caregiver, or Care coordination (not separately reported).       Each patient provided with medical services by telemedicine is:  (1) informed of the relationship between the physician and patient and the respective role of any other health care provider with respect to management of the patient; and (2) notified that he or she may decline to receive medical services by telemedicine and may withdraw from such care at any time.          ASSESSMENT/PLAN:     Plan/Recommendations:  Diagnoses and all orders for this visit:      Congestive heart failure, unspecified HF chronicity, unspecified heart failure type /  Acute decompensated heart failure / Pulmonary HTN  -NICM since 2013 HFrEF (EF 10%) s/p AICD placement  -Not a transplant candidate  -recent admission for ADHF, now on palliative dobutamine, discussed the meaning of this  -Taking Bumex BID, discussed taking last dose earlier to avoid interrupted sleep  -reports dyspnea, however feels less fatigue.   -discussed her prognosis     Pain   -reports severe pain  -the improved with opioids in the past  -Will send Norco 7.5-325mg q 8H PRN       Gout   -Continue  allopurinol  -referral to rheumatology sent      Fatigue/dyspnea  - discussed the importance of graded exercise      Depression/anxiety  -worsening depression  -struggling to accept her diagnosis and prognosis   - discussed PM Behavioral therapy and  support   -referrals placed  -Increased lexapro to 10mg daily   -discussed avoiding Xanax as much as possible     Nausea  -  ondansetron (ZOFRAN-ODT) 8 MG TbDL; Take 1 tablet (8 mg total) by mouth every 12 (twelve) hours as needed (nausea).  -refilled    Constipation  Trial of miralax        Palliative care encounter  Medicolegal: Has decision making capacity. Named her daughter Erika Estrada is HCPOA.     Psychosocial:  support system consists of granddaughter, daughter and extended faily     Spiritual: Ep[employer]opalian    Understanding of disease and Illness Trajectory: Patient  has  adequate understanding of her illness, they can benefit from continued education on what to expect in the future.      Goals of care:    Advance Care Planning   Date: 05/24/2023  I engaged the patient in a conversation about advance care planning and we specifically addressed what the goals of care would be moving forward, in light of the patient's change in clinical status, specifically now on palliative dobutamine.  We did specifically address the patient's likely prognosis, which is poor.  We discussed that dobutamine is mainly to help her feel better and will not change her heart condition. We explored the patient's values and preferences for future care.  The patient endorses that what is most important right now is to focus on remaining as independent as possible, symptom/pain control, and curative/life-prolongation (regardless of treatment burdens).  She shared that she is hoping for the better that she is hoping to get better.  She hopes that her heart will get better, she shared that she knows that she has been told in the past that it will not improve.  She shared that she is  hoping for a miracle to happen so that her heart improves.  She is also hoping to be able to spend more time with family to be part of the activities to go out and enjoy eating with them.  Accordingly, we have decided that the best plan to meet the patient's goals includes continuing with treatment           ACP Date: 02/27/2023  I engaged the patient in a conversation about advance care planning.  We discussed HCPOA, she wanted to change her current HCPOA and name her daughter Erika Estrada as her agent and her sister and son as backups. We specifically addressed what the goals of care would be moving forward, in light of the patient's change in clinical status, specifically transplant workup.  We did specifically address the patient's likely prognosis, which is poor w/o a trasnplant.  We explored the patient's values and preferences for future care.  The patient endorses that what is most important right now is to focus on curative/life-prolongation (regardless of treatment burdens)  Accordingly, we have decided that the best plan to meet the patient's goals includes continuing with treatment      Code status:  Full code    Advance directives:HCPOA on file       16 min time was spent on advance care planning, goals of care discussion, emotional support, formulating and communicating prognosis and goals of care, exploring burden/benefit of various approaches of treatment.          Follow up: 1m         SUBJECTIVE:     History obtained from: patient     complaint: No chief complaint on file.      History of Present Illness / Interval History:  Hafsa Hawley is 57 y.o. year old female presenting with heart failure .  Referred to Palliative Care for evaluation and management of physical symptoms. female attended the appointment alone     She reports that she is feeling okay now that she is on the dobutamine pump.  She feels it has made a difference in her energy level and that she is not eating too much  oxygen anymore.  She continues to hope for improvement.  However she is unable to do the things that she enjoys like spending time with her grandkids and going out because sometimes she is not feeling well.  She feels fatigued she has a lot of pain in her knees and her hips and legs.  She is unable to walk at times.  She also endorsed decreased appetite and significant nausea that improves with Zofran however she needs to take 8 mg for it to work.         Disease History:  NICM since 2013 HFrEF (EF 10%) s/p AICD placement  pulmonary hypertension  chronic hypoxic and hypercapnic respiratory failure   Paroxysmal A Fib on Eliquis, Pulmonary Hypertension  Hypertension, Type 2 Diabetes Mellitus     evaluated by our team and presented to committee on 3/9/22.  At the time she was declined for OHT or LVAD due to renal function, lung function and difficulty consistently following up - undergoing evaluation for heart-kidney transplant    Previous experience or exposure to a serious illness: no      External  database queried on 05/24/2023  by Sandra Hernandes .   The results reviewed and considered with the clinical data in the decision whether or not to prescribe a controlled substance.   05/10/2023  03/13/2023   1  Alprazolam 2 Mg Tablet 60.00  30  Ch Par  [id number]   Mara ([id number])  2  8.00 LME  [place]     05/04/2023 03/01/2023   3  Promethazine-Codeine Solution 177.00  6  Ch Par  [id number]   Och (1974)  0  8.85 MME  [place]     05/03/2023 05/03/2023   1  Pregabalin 50 Mg Capsule 60.00  30  Mi Rajwinder  [id number]   Mara ([id number])  0  0.67 LME  [place]     04/11/2023 03/13/2023   1  Alprazolam 2 Mg Tablet 60.00  30  Ch Par              Medications:    Current Outpatient Medications:     albuterol-ipratropium (DUO-NEB) 2.5 mg-0.5 mg/3 mL nebulizer solution, Take 3 mLs by nebulization 2 (two) times a day., Disp: 90 mL, Rfl: 3    allopurinoL (ZYLOPRIM) 100 MG tablet, Take 1 tablet (100 mg total) by mouth daily as  needed (gout attack)., Disp: , Rfl:     ALPRAZolam (XANAX) 2 MG Tab, Take 2 mg by mouth 2 (two) times daily., Disp: , Rfl:     amiodarone (PACERONE) 200 MG Tab, Take 200 mg by mouth., Disp: , Rfl:     atorvastatin (LIPITOR) 40 MG tablet, Take 1 tablet (40 mg total) by mouth every evening., Disp: 90 tablet, Rfl: 3    b complex vitamins tablet, Take 1 tablet by mouth once daily., Disp: , Rfl:     blood sugar diagnostic (ACCU-CHEK GUIDE TEST STRIPS) Strp, 1 strip by Other route 3 (three) times daily., Disp: 100 each, Rfl: 11    bumetanide (BUMEX) 2 MG tablet, Take 2mg in the morning and 1mg in the evening, Disp: 120 tablet, Rfl: 11    cetirizine (ZYRTEC) 10 MG tablet, Take 10 mg by mouth daily as needed for Allergies., Disp: , Rfl:     diclofenac sodium (VOLTAREN) 1 % Gel, APPLY 2 GRAMS TOPICALLY 3 (THREE) TIMES DAILY AS NEEDED (PAIN)., Disp: 400 g, Rfl: 0    ELIQUIS 5 mg Tab, TAKE 1 TABLET (5 MG TOTAL) BY MOUTH 2 (TWO) TIMES DAILY., Disp: 60 tablet, Rfl: 2    empagliflozin (JARDIANCE) 10 mg tablet, Take 1 tablet (10 mg total) by mouth once daily., Disp: 30 tablet, Rfl: 5    ergocalciferol (ERGOCALCIFEROL) 50,000 unit Cap, Take 1 capsule (50,000 Units total) by mouth every Saturday., Disp: 12 capsule, Rfl: 1    EScitalopram oxalate (LEXAPRO) 5 MG Tab, Take 1 tablet (5 mg total) by mouth once daily., Disp: 30 tablet, Rfl: 2    fluticasone propionate (FLONASE) 50 mcg/actuation nasal spray, 2 sprays by Each Nostril route daily as needed for Rhinitis. , Disp: , Rfl:     isosorbide-hydrALAZINE 20-37.5 mg (BIDIL) 20-37.5 mg Tab, Take 1 tablet by mouth 3 (three) times daily., Disp: 90 tablet, Rfl: 11    lancets (ACCU-CHEK SOFTCLIX LANCETS) Misc, 1 Device by Misc.(Non-Drug; Combo Route) route 3 (three) times daily., Disp: 100 each, Rfl: 11    LIDOcaine (LIDODERM) 5 %, Place 1 patch onto the skin daily as needed (pain). Remove & Discard patch within 12 hours or as directed by MD, Disp: 30 patch, Rfl: 5    mirtazapine (REMERON)  7.5 MG Tab, Take 1 tablet (7.5 mg total) by mouth every evening., Disp: 30 tablet, Rfl: 1    mometasone-formoterol (DULERA) 200-5 mcg/actuation inhaler, Inhale 2 puffs into the lungs 2 (two) times daily. Controller, Disp: 13 g, Rfl: 11    ondansetron (ZOFRAN-ODT) 4 MG TbDL, Take 1 tablet (4 mg total) by mouth 2 (two) times daily., Disp: 20 tablet, Rfl: 0    pantoprazole (PROTONIX) 40 MG tablet, TAKE 1 TABLET BY MOUTH DAILY IN THE MORNING, Disp: 30 tablet, Rfl: 11    pregabalin (LYRICA) 50 MG capsule, Take 1 capsule (50 mg total) by mouth 2 (two) times daily., Disp: 60 capsule, Rfl: 5    rOPINIRole (REQUIP) 4 MG tablet, TAKE 1 TABLET (4 MG TOTAL) BY MOUTH ONCE DAILY. PT TAKING 4MG DAILY, Disp: 90 tablet, Rfl: 1    sacubitriL-valsartan (ENTRESTO) 49-51 mg per tablet, Take 1 tablet by mouth 2 (two) times daily., Disp: 180 tablet, Rfl: 0    semaglutide 0.25 mg or 0.5 mg (2 mg/3 mL) PnIj, Inject 0.5 mg into the skin every 7 days., Disp: 1 each, Rfl: 11    SPIRIVA WITH HANDIHALER 18 mcg inhalation capsule, Inhale 1 capsule (18 mcg total) into the lungs once daily., Disp: 30 capsule, Rfl: 5    traZODone (DESYREL) 50 MG tablet, Take 25 mg by mouth nightly as needed., Disp: , Rfl:     VENTOLIN HFA 90 mcg/actuation inhaler, Inhale 2 puffs into the lungs every 6 (six) hours as needed for Shortness of Breath or Wheezing., Disp: 18 g, Rfl: 11  No current facility-administered medications for this visit.    Facility-Administered Medications Ordered in Other Visits:     0.9%  NaCl infusion, , Intravenous, Continuous, Corinna Hayes NP, New Bag at 05/23/19 0745    vancomycin in dextrose 5 % 1 gram/250 mL IVPB 1,000 mg, 1,000 mg, Intravenous, On Call Procedure, Corinna Hayes NP, 1,000 mg at 05/23/19 0736      Past Medical History:   Diagnosis Date    Anemia     Arthritis     Atrial fibrillation     OAC    Chronic respiratory failure with hypoxia, on home oxygen therapy     2L with activity, off at rest.  Per Pulm  no  overt evidence of ILD or COPD on PFTs and CT to explain O2 needs.    CKD (chronic kidney disease), stage IV 05/08/2018    Congestive heart failure     s/p AICD placement,    Deep vein thrombosis     Depression     elevated bilirubin d/t Gilbert's syndrome     confirmed by [place] genetic testing, evaluated by hepatology    Encounter for blood transfusion     GERD (gastroesophageal reflux disease)     Hypertension     Pheochromocytoma, malignant     Right kidney mass     Sleep apnea     Thalassemia trait, alpha     Thyroid disease     Type 2 diabetes mellitus with hyperglycemia, without long-term current use of insulin 08/13/2020     Past Surgical History:   Procedure Laterality Date    APPENDECTOMY      BONE MARROW BIOPSY      CARDIAC DEFIBRILLATOR PLACEMENT Left 12/2016    CARDIAC ELECTROPHYSIOLOGY MAPPING AND ABLATION      CARDIAC ELECTROPHYSIOLOGY MAPPING AND ABLATION      COLONOSCOPY N/A 5/6/2022    Procedure: COLONOSCOPY;  Surgeon: Arely Betancourt MD;  Location: [facility] ENDO ([address] [place] [address]);  Service: Endoscopy;  Laterality: N/A;  heart transplant candidate/ EF 25% - 2nd floor/ defib - Biotronik - ERW  Eliquis - per Dr. Cortez with [facility], Pt ok to hold Eliquis x 2 days prior-see media tab-outside correspondence dated 12/30/21  - [facility]  verbal instructions/portal instructions/email instructions - s    EYE SURGERY      due to running tears    FRACTURE SURGERY Left     hand 5th digit    HYSTERECTOMY      KNEE SURGERY Left 2016    hematoma    LIVER BIOPSY  10/24/2018    Minimal steatosis, predominantly macrovesicular, 1%, Minimal nonspecific portal inflammation, no fibrosis. No findings on biopsy to explain elevated bilirubin levels. Could be d/t Gilbert's =?- hemolysis    RIGHT HEART CATHETERIZATION Right 12/07/2021    Procedure: INSERTION, CATHETER, RIGHT HEART;  Surgeon: Irving Cardenas MD;  Location: [facility] CATH LAB;  Service: Cardiology;  Laterality: Right;    RIGHT HEART CATHETERIZATION Right 12/19/2022    Procedure:  "INSERTION, CATHETER, RIGHT HEART;  Surgeon: Burke Camilo MD;  Location: [facility] CATH LAB;  Service: Cardiology;  Laterality: Right;    RIGHT HEART CATHETERIZATION Right 3/29/2023    Procedure: INSERTION, CATHETER, RIGHT HEART;  Surgeon: Katie Liriano DO;  Location: [facility] CATH LAB;  Service: Cardiology;  Laterality: Right;    TRANSJUGULAR BIOPSY OF LIVER N/A 10/24/2018    Procedure: BIOPSY, LIVER, TRANSJUGULAR APPROACH;  [facility];  Location: [facility] OR [facility] FLR;  Service: Radiology;  Laterality: N/A;     Family History   Problem Relation Age of Onset    Cancer Mother         pancreatic CA early 50's    Heart disease Father          MI in late 50's    Hypertension Father     Heart attack Father     Heart disease Sister     Heart disease Brother     Cirrhosis Brother         alcoholic    Heart disease Sister     Heart disease Brother     Hypertension Brother     Diabetes Brother      Review of patient's allergies indicates:   Allergen Reactions    Penicillins Hives and Other (See Comments)    Iodinated contrast media Nausea And Vomiting    Oxycodone-acetaminophen Other (See Comments)     Nausea, Dizziness, Anxiety.  "I don't like how it makes me feel."   Given Hydromorphone 0.5mg IVP  Without problems.  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 Metabolism of other drugs not listed here may also be impacted by UGT1A1 enzyme activity.    " "   Tramadol Nausea And Vomiting and Other (See Comments)     Other reaction(s): Other (See Comments)    Valsartan Other (See Comments)       OBJECTIVE:     Zahl Symptom Assessment System  Pain Score: Ten  Nausea Score: 10  Depression Score: 0  Anxiety Score: 10  Drowsiness Score: 8  Appetite Score: 6  Dyspnea Score: 0          Review of Symptoms      Symptom Assessment (ESAS 0-10 Scale)  Pain:  10  Anxiety:  0  Nausea:  0  Depression:  0  Anorexia:  6  Fatigue:  0  Insomnia:  8  Restlessness:  0  Agitation:  0     CAM / Delirium:  Negative  Constipation:  Negative  Diarrhea:  Negative    Anxiety:  Is not nervous/anxious  Constipation:  Constipation    Pain Assessment:    Location(s): leg    Leg       Location: bilateral        Quality: Aching        Quantity: 10/10 in intensity        Chronicity: Onset 1 week(s) ago, rapidly worsening        Aggravating Factors: None        Alleviating Factors: None        Associated Symptoms: None    Performance Status:  60    Living Arrangements:  Lives with family    Psychosocial/Cultural:   See Palliative Psychosocial Note: Yes  Lives with  and granddaughter. Pt lives in a mobile home with 5 NIRAJ. Pt also has support from her two sisters Benoit Baum [phone], Jeanie Jaimes [phone]. Pt is mostly independent with ADL's, but has a RW, SC and home oxygen, which she states she uses "as needed".   dgtr Erika Estrada, 33, [place], son Miguel Baum, 35  **Primary  to Follow**  Palliative Care  Consult: Yes    Spiritual:  F - Damaris and Belief:  Denominational     Advance Care Planning   Advance Directives:   Living Will: No    LaPOST: No    Do Not Resuscitate Status: No    Medical Power of : Yes    Agent's Name:  Erika Estrada    Decision Making:  Patient answered questions  Goals of Care: What is most important right now is to focus on curative/life-prolongation (regardless of treatment burdens). Accordingly, we have decided that the best " plan to meet the patient's goals includes continuing with treatment.            Physical Exam:  Vitals:    Physical Exam  Constitutional:       General: She is not in acute distress.     Comments: Sitting in a chair    HENT:      Head: Normocephalic and atraumatic.   Eyes:      General: No scleral icterus.  Pulmonary:      Effort: Pulmonary effort is normal. No respiratory distress.   Abdominal:      General: There is no distension.   Musculoskeletal:      Cervical back: Neck supple.   Skin:     Findings: No rash.   Neurological:      Mental Status: She is alert and oriented to person, place, and time.   Psychiatric:         Mood and Affect: Mood normal. Affect is tearful.         Labs:  CBC:   WBC   Date Value Ref Range Status   05/22/2023 3.39 (L) 3.90 - 12.70 K/uL Final       Hemoglobin   Date Value Ref Range Status   05/22/2023 9.6 (L) 12.0 - 16.0 g/dL Final       POC Hematocrit   Date Value Ref Range Status   12/07/2021 45 36 - 54 %PCV Final     Hematocrit   Date Value Ref Range Status   05/22/2023 32.8 (L) 37.0 - 48.5 % Final       MCV   Date Value Ref Range Status   05/22/2023 61 (L) 82 - 98 fL Final       Platelets   Date Value Ref Range Status   05/22/2023 149 (L) 150 - 450 K/uL Final       LFT:   Lab Results   Component Value Date    AST 22 05/22/2023    ALKPHOS 57 05/22/2023    BILITOT 1.7 (H) 05/22/2023       Albumin:   Albumin   Date Value Ref Range Status   05/22/2023 3.8 3.5 - 5.2 g/dL Final     Protein:   Total Protein   Date Value Ref Range Status   05/22/2023 6.7 6.0 - 8.4 g/dL Final         Radiology:I have reviewed all pertinent imaging results/findings within the past 24 hours.  Results for orders placed during the hospital encounter of 03/26/23    Echo    Interpretation Summary  · The left ventricle is severely enlarged with eccentric hypertrophy and severely decreased systolic function. The estimated ejection fraction is 15%.  · Normal right ventricular size with moderately reduced right  ventricular systolic function.  · Grade II left ventricular diastolic dysfunction.  · Biatrial enlargement.  · Mild mitral regurgitation.  · Small pericardial effusion.  · The estimated PA systolic pressure is 51 mmHg (by tricuspid regurgitation velocity). The estimated mean PA pressure is 39mm Hg.  · Intermediate central venous pressure (8 mmHg).        I spent a total of 40 minutes on the day of the visit. This includes face to face time in discussion of goals of care, symptom assessment, coordination of care and emotional support.  This also includes non-face to face time preparing to see the patient (eg, review of tests/imaging), obtaining and/or reviewing separately obtained history, documenting clinical information in the electronic or other health record, independently interpreting results and communicating results to the patient/family/caregiver, or care coordinator.      16 minutes discussing ACP      This note was partially created using Koemei Voice Recognition software. Typographical and content errors may occur with this process. While efforts are made to detect and correct such errors, in some cases errors will persist. For this reason, wording in this document should be considered in the proper context and not strictly verbatim.      Signature: Sandra Hernandes MD

## 2023-05-25 ENCOUNTER — TELEPHONE (OUTPATIENT)
Dept: FAMILY MEDICINE | Facility: CLINIC | Age: 58
End: 2023-05-25
Payer: MEDICAID

## 2023-05-25 DIAGNOSIS — M51.36 LUMBAR DEGENERATIVE DISC DISEASE: ICD-10-CM

## 2023-05-25 DIAGNOSIS — R06.00 DYSPNEA, UNSPECIFIED TYPE: ICD-10-CM

## 2023-05-25 DIAGNOSIS — R52 PAIN: ICD-10-CM

## 2023-05-25 RX ORDER — HYDROCODONE BITARTRATE AND ACETAMINOPHEN 7.5; 325 MG/1; MG/1
1 TABLET ORAL EVERY 8 HOURS PRN
Qty: 90 TABLET | Refills: 0 | Status: ON HOLD | OUTPATIENT
Start: 2023-05-25 | End: 2023-06-30 | Stop reason: HOSPADM

## 2023-05-25 RX ORDER — LIDOCAINE 50 MG/G
1 PATCH TOPICAL DAILY PRN
Qty: 30 PATCH | Refills: 5 | Status: ON HOLD | OUTPATIENT
Start: 2023-05-25 | End: 2023-06-21 | Stop reason: HOSPADM

## 2023-05-25 NOTE — TELEPHONE ENCOUNTER
----- Message from Frank Cook sent at 2023 11:43 AM CDT -----  Contact: Patient  Hafsa Hawley  MRN: [id number]  : 1965  PCP: Primary Doctor [place]  Home Phone      [phone]  Work Phone      Not on file.  Mobile          [phone]  Home Phone      [phone]      MESSAGE: requesting that Rx for Lidocaine patches be sent to [facility] instead of [facility]    Call [phone]    PCP: Seth

## 2023-05-28 ENCOUNTER — TELEPHONE (OUTPATIENT)
Dept: TRANSPLANT | Facility: CLINIC | Age: 58
End: 2023-05-28
Payer: MEDICAID

## 2023-05-28 NOTE — TELEPHONE ENCOUNTER
Late entry but I concur with Dr. Guardado's note    MD Cyndee Elliott MD; Jake Orozco Jr., MD  Caller: Unspecified (1 month ago)  Hello Dr Ángel Dash     I agree the low DLCO and severe restriction is likely related to some amount of PAH/vascular remodeling from her long standing CHF. While she does not have lung parenchymal issues, her PFTs were reviewed by our surgeons and were still deemed too poor for consideration for OHT.     Furthermore, that was only part of the issue. Unfortunately she has been evaluated twice for OHT here and has been turned down twice and a big component is her medical non adherence. She frequently does not take her medications as prescribed or does not know what medications she is supposed to be taking despite us instructing her during both evaluations to bring in her medications or bring in a medication list. Therefore a major barrier for her is her ability to follow medical instructions and demonstrate compliance. This was shared with her after both selection meetings but unfortunately she still seems to believe the primary issue is her lungs.     I will reiterate this again when I see her in clinic.     Stoney

## 2023-05-28 NOTE — TELEPHONE ENCOUNTER
----- Message from Cyndee Mattson MD sent at 4/11/2023  2:49 PM CDT -----  Regarding: Pulmonary eval for heart transplant   Hi   I am Ms Pereyra's pulmonologist at [facility] and she asked me to reach out to you guys. She reports you can not find my notes (its in Epic, last note today) and was told that that was a big reason for not listing her for heart transplant.  She has severe restriction on PFts with no evidence of COPD, pulmonary fibrosis or neuromuscular weakness. Other than possibly PAH with PVR 5.5 on last right heart Cath, I can't find a pulmonary reason for her SOB other than her HFrEF. Her v/Q scan in the past was negative.    Thanks    Cyndee Mattson  Pulmonary Critical Care   [facility]  [phone]

## 2023-05-28 NOTE — TELEPHONE ENCOUNTER
----- Message from Stoney Guardado MD sent at 4/12/2023 12:48 PM CDT -----  Regarding: RE: Pulmonary eval for heart transplant  Hello Dr Ángel Dash    I agree the low DLCO and severe restriction is likely related to some amount of PAH/vascular remodeling from her long standing CHF. While she does not have lung parenchymal issues, her PFTs were reviewed by our surgeons and were still deemed too poor for consideration for OHT.    Furthermore, that was only part of the issue. Unfortunately she has been evaluated twice for OHT here and has been turned down twice and a big component is her medical non adherence. She frequently does not take her medications as prescribed or does not know what medications she is supposed to be taking despite us instructing her during both evaluations to bring in her medications or bring in a medication list. Therefore a major barrier for her is her ability to follow medical instructions and demonstrate compliance. This was shared with her after both selection meetings but unfortunately she still seems to believe the primary issue is her lungs.    I will reiterate this again when I see her in clinic.    Stoney    ----- Message -----  From: Cyndee Mattson MD  Sent: 4/11/2023   3:02 PM CDT  To: Jake Orozco Jr., MD, Stoney Guardado MD  Subject: Pulmonary eval for heart transplant               Hi   I am Ms Pereyra's pulmonologist at [facility] and she asked me to reach out to you guys. She reports you can not find my notes (its in Epic, last note today) and was told that that was a big reason for not listing her for heart transplant.  She has severe restriction on PFts with no evidence of COPD, pulmonary fibrosis or neuromuscular weakness. Other than possibly PAH with PVR 5.5 on last right heart Cath, I can't find a pulmonary reason for her SOB other than her HFrEF. Her v/Q scan in the past was negative.    Thanks    Cyndee Mattson  Pulmonary Critical Care   [facility]  [phone]

## 2023-05-29 ENCOUNTER — OFFICE VISIT (OUTPATIENT)
Dept: TRANSPLANT | Facility: CLINIC | Age: 58
End: 2023-05-29
Payer: MEDICAID

## 2023-05-29 ENCOUNTER — TELEPHONE (OUTPATIENT)
Dept: PALLIATIVE MEDICINE | Facility: CLINIC | Age: 58
End: 2023-05-29
Payer: MEDICAID

## 2023-05-29 ENCOUNTER — INFUSION (OUTPATIENT)
Dept: INFUSION THERAPY | Facility: HOSPITAL | Age: 58
End: 2023-05-29
Attending: INTERNAL MEDICINE
Payer: MEDICAID

## 2023-05-29 ENCOUNTER — LAB VISIT (OUTPATIENT)
Dept: LAB | Facility: HOSPITAL | Age: 58
End: 2023-05-29
Attending: INTERNAL MEDICINE
Payer: MEDICAID

## 2023-05-29 VITALS
WEIGHT: 184.75 LBS | SYSTOLIC BLOOD PRESSURE: 99 MMHG | DIASTOLIC BLOOD PRESSURE: 63 MMHG | BODY MASS INDEX: 29.69 KG/M2 | HEIGHT: 66 IN | HEART RATE: 90 BPM

## 2023-05-29 DIAGNOSIS — I50.42 CHRONIC COMBINED SYSTOLIC AND DIASTOLIC HEART FAILURE: Chronic | ICD-10-CM

## 2023-05-29 DIAGNOSIS — I50.42 CHRONIC COMBINED SYSTOLIC AND DIASTOLIC HEART FAILURE: ICD-10-CM

## 2023-05-29 DIAGNOSIS — J98.4 RESTRICTIVE LUNG DISEASE: ICD-10-CM

## 2023-05-29 DIAGNOSIS — Z95.810 ICD (IMPLANTABLE CARDIOVERTER-DEFIBRILLATOR) IN PLACE: Chronic | ICD-10-CM

## 2023-05-29 DIAGNOSIS — I10 ESSENTIAL HYPERTENSION: Chronic | ICD-10-CM

## 2023-05-29 DIAGNOSIS — I27.20 PULMONARY HYPERTENSION: Primary | ICD-10-CM

## 2023-05-29 LAB
BNP SERPL-MCNC: 329 PG/ML (ref 0–99)
MAGNESIUM SERPL-MCNC: 1.8 MG/DL (ref 1.6–2.6)

## 2023-05-29 PROCEDURE — 3008F PR BODY MASS INDEX (BMI) DOCUMENTED: ICD-10-PCS | Mod: CPTII,,, | Performed by: INTERNAL MEDICINE

## 2023-05-29 PROCEDURE — 99213 OFFICE O/P EST LOW 20 MIN: CPT | Mod: PBBFAC | Performed by: INTERNAL MEDICINE

## 2023-05-29 PROCEDURE — 3044F PR MOST RECENT HEMOGLOBIN A1C LEVEL <7.0%: ICD-10-PCS | Mod: CPTII,,, | Performed by: INTERNAL MEDICINE

## 2023-05-29 PROCEDURE — 99214 OFFICE O/P EST MOD 30 MIN: CPT | Mod: S$PBB,,, | Performed by: INTERNAL MEDICINE

## 2023-05-29 PROCEDURE — 1159F PR MEDICATION LIST DOCUMENTED IN MEDICAL RECORD: ICD-10-PCS | Mod: CPTII,,, | Performed by: INTERNAL MEDICINE

## 2023-05-29 PROCEDURE — 83735 ASSAY OF MAGNESIUM: CPT | Performed by: INTERNAL MEDICINE

## 2023-05-29 PROCEDURE — 99999 PR PBB SHADOW E&M-EST. PATIENT-LVL III: CPT | Mod: PBBFAC,,, | Performed by: INTERNAL MEDICINE

## 2023-05-29 PROCEDURE — 36415 COLL VENOUS BLD VENIPUNCTURE: CPT | Performed by: INTERNAL MEDICINE

## 2023-05-29 PROCEDURE — 3078F DIAST BP <80 MM HG: CPT | Mod: CPTII,,, | Performed by: INTERNAL MEDICINE

## 2023-05-29 PROCEDURE — 99999 PR PBB SHADOW E&M-EST. PATIENT-LVL III: ICD-10-PCS | Mod: PBBFAC,,, | Performed by: INTERNAL MEDICINE

## 2023-05-29 PROCEDURE — 1111F DSCHRG MED/CURRENT MED MERGE: CPT | Mod: CPTII,,, | Performed by: INTERNAL MEDICINE

## 2023-05-29 PROCEDURE — 83880 ASSAY OF NATRIURETIC PEPTIDE: CPT | Performed by: INTERNAL MEDICINE

## 2023-05-29 PROCEDURE — 4010F PR ACE/ARB THEARPY RXD/TAKEN: ICD-10-PCS | Mod: CPTII,,, | Performed by: INTERNAL MEDICINE

## 2023-05-29 PROCEDURE — 3078F PR MOST RECENT DIASTOLIC BLOOD PRESSURE < 80 MM HG: ICD-10-PCS | Mod: CPTII,,, | Performed by: INTERNAL MEDICINE

## 2023-05-29 PROCEDURE — 3044F HG A1C LEVEL LT 7.0%: CPT | Mod: CPTII,,, | Performed by: INTERNAL MEDICINE

## 2023-05-29 PROCEDURE — 4010F ACE/ARB THERAPY RXD/TAKEN: CPT | Mod: CPTII,,, | Performed by: INTERNAL MEDICINE

## 2023-05-29 PROCEDURE — 99214 PR OFFICE/OUTPT VISIT, EST, LEVL IV, 30-39 MIN: ICD-10-PCS | Mod: S$PBB,,, | Performed by: INTERNAL MEDICINE

## 2023-05-29 PROCEDURE — 3074F SYST BP LT 130 MM HG: CPT | Mod: CPTII,,, | Performed by: INTERNAL MEDICINE

## 2023-05-29 PROCEDURE — 1159F MED LIST DOCD IN RCRD: CPT | Mod: CPTII,,, | Performed by: INTERNAL MEDICINE

## 2023-05-29 PROCEDURE — 3008F BODY MASS INDEX DOCD: CPT | Mod: CPTII,,, | Performed by: INTERNAL MEDICINE

## 2023-05-29 PROCEDURE — 3074F PR MOST RECENT SYSTOLIC BLOOD PRESSURE < 130 MM HG: ICD-10-PCS | Mod: CPTII,,, | Performed by: INTERNAL MEDICINE

## 2023-05-29 PROCEDURE — 1111F PR DISCHARGE MEDS RECONCILED W/ CURRENT OUTPATIENT MED LIST: ICD-10-PCS | Mod: CPTII,,, | Performed by: INTERNAL MEDICINE

## 2023-05-29 RX ORDER — FERROUS SULFATE 325(65) MG
TABLET ORAL
Status: ON HOLD | COMMUNITY
Start: 2023-05-11 | End: 2023-06-30 | Stop reason: SDUPTHER

## 2023-05-29 NOTE — PROGRESS NOTES
Advanced Heart Failure and Transplantation Clinic Follow up.        Attending Physician: Burke Camilo MD.  The patient's last visit with me was on 2/1/2022.         HPI.  Ms. Hawley is a 57 y.o. year old Black or  female who has presented to be evaluated as a potential heart transplant recipient.       56 yo BF with CHF since 2013 due to NICM, pulmonary hypertension, DM, HTN, permanent AF and ICD.  She was previously evaluated by our team and presented to committee on 3/9/22.  At the time she was declined for OHT or LVAD due to renal function, lung function and difficulty consistently following up with the team.   She returns for f/u visit as undergoing evaluation for heart-kidney transplant.  She does not have her medications with her.  She is not have a medication list of her home medicines with her.  She does not know any of her medications by name except Lasix.  Was described as Lasix 80 mg twice daily but she reports she takes the morning dose but only use the evening dose if she feels the need to do so.  I asked her how she knows without a scale she said she just bases this on her abdominal bloating and any edema.  She reports that her scale is broken and she has not gotten a new 1 so she has no daily weights to report.  She is accompanied by her granddaughter.  She tells me if she was approved for transplant her primary caregiver will be Jeanie Jaimes (sister and Laura Jaimes her niece.  She is going to stay in Laura's house after her transplant, if approved.     She saw Dr. Guardado in January 2023 at which time sent to the ED and admitted to [facility] medicine 1/3/23.  She was treated for ADHF with IV lasix 80 mg IV BID and [facility] consulted.  At that time it was noted that she had run out of metoprolol for 1 week PTA.  She was readmitted to [facility] few weeks later with ADHF and at discharge told to see [facility].   She came Feb 3, 2023 to see Dr. Guardado but was sent to ED with nausea and vomiting.  There treated with IV lasix and sent home from ED.  She comes today for f/u visit.  Of note per Dr. Guardado she was seen by Pulm in Jan 2023 and they felt that there was no overt evidence of ILD or COPD on PFTs and CT chest.  They felt that the etiology of her chronic hypoxic and hypercapnic respiratory failure was not clear.     I saw her February 16, 2023 at which time she reported that her shortness of breath was stable and described NYHA class 3 symptoms.  At this visit I was very concerned that her lung disease is her limiting factor based upon the desaturation with exercise not just today but also on the day of her CPX.  On the CPX in December her breathing reserve was borderline reduced.  An elevated VCO2 slope can occur with lung disease as well as heart failure.  I suspect she has a combination of lung disease and heart failure that is limiting her ability to be physically active and resulting in her high level of symptomology.  I note on her problem list a diagnosis of chronic respiratory hypoxic and hypercapnic respiratory failure.  I did not see a blood gas to confirm this diagnosis so requested that one be obtained.     She saw Dr. Chacko February 27, 2023 note not yet signed but at end of current note he mentions his concern regarding PFT's.  On 2/27/2023 PFT's read as reduced FEV1/SVC ratio (66%) and low FEF 25-75 suggest mild obstruction on spirometry. Lung volumes show moderate restriction is present. Overall spirometry shows severe ventilatory impairment. DLCO is moderately decreased. MVV is severely decreased. On 2/27/2023 ABG 7.46/pCO2 was 39 and pO2 was 73 and these ABG's do not reflect the previously described chronic hypoxic and hypercapnic respiratory failure.     She sees Dr. Ángel Elizabeth as Pulm at [facility].  His notes describe on PFTs December 22, 2021 severe restriction with moderately reduced DLCO.  He stated  "on his note of December 8, 2022 that she was on home oxygen felt she was benefitting from it.  However he does concludes that there is no evidence of ILD or COPD on PFTs/CT chest.   On August 1, 2022 he documented in an addendum that was signed March 24, 2022 that the patient had chronic respiratory failure with hypoxia and hypercapnia:  Patient is on home oxygen and is benefitting from it.   I found a note from February 16, 2022 under assessment he describes chronic respiratory failure with hypoxia and hypercapnia.  He reports that she was trying to wean herself off oxygen."     Her PFTs today 03/15/2023 demonstrates severe restriction and severely reduced MVV there is also the possibility of mild obstruction per report.  In February 27,2023 she has a blood gas reportedly on room air that is normal without CO2 retention (7.46/39/73).     She was presented to selection committee again on 3/29/23 and was declined for Heart Transplant listing due to poor medical adherence, declined pulmonary function, and declined renal function.       Her symptoms of heart failure include shortness of breath and sleeping on 2 pillows.  Though she has deteriorated regarding renal function she feels that her shortness of breath is a little bit better today than when she left the hospital last week.    Today May 29, 2023, patient comes after 4 day hospitalization for volume overload. Patient was started in low dose dobutamine. She is no longer under consideration for any advanced cardiac therapies.      Review of Systems   Constitutional:  Positive for fatigue. Negative for chills, diaphoresis and fever.   HENT:  Negative for nasal congestion, rhinorrhea and sore throat.    Eyes:  Negative for visual disturbance.   Respiratory:  Positive for shortness of breath.    Cardiovascular:  Negative for chest pain, palpitations and leg swelling.   Gastrointestinal:  Negative for abdominal pain, diarrhea, nausea and vomiting.   Genitourinary: "  Negative for difficulty urinating, dysuria and hematuria.   Integumentary:  Negative for rash.   Neurological:  Negative for seizures, syncope and light-headedness.   Psychiatric/Behavioral:  Negative for agitation and hallucinations.       Past Medical History:   Diagnosis Date    Anemia     Arthritis     Atrial fibrillation     OAC    Chronic respiratory failure with hypoxia, on home oxygen therapy     2L with activity, off at rest.  Per Pulm  no overt evidence of ILD or COPD on PFTs and CT to explain O2 needs.    CKD (chronic kidney disease), stage IV 05/08/2018    Congestive heart failure     s/p AICD placement,    Deep vein thrombosis     Depression     elevated bilirubin d/t Gilbert's syndrome     confirmed by [place] genetic testing, evaluated by hepatology    Encounter for blood transfusion     GERD (gastroesophageal reflux disease)     Hypertension     Pheochromocytoma, malignant     Right kidney mass     Sleep apnea     Thalassemia trait, alpha     Thyroid disease     Type 2 diabetes mellitus with hyperglycemia, without long-term current use of insulin 08/13/2020        Past Surgical History:   Procedure Laterality Date    APPENDECTOMY      BONE MARROW BIOPSY      CARDIAC DEFIBRILLATOR PLACEMENT Left 12/2016    CARDIAC ELECTROPHYSIOLOGY MAPPING AND ABLATION      CARDIAC ELECTROPHYSIOLOGY MAPPING AND ABLATION      COLONOSCOPY N/A 5/6/2022    Procedure: COLONOSCOPY;  Surgeon: Arely Betancourt MD;  Location: [facility] ([address] [place] [address]);  Service: Endoscopy;  Laterality: N/A;  heart transplant candidate/ EF 25% - 2nd floor/ defib - Biotronik - ERW  Eliquis - per Dr. Cortez with [facility], Pt ok to hold Eliquis x 2 days prior-see media tab-outside correspondence dated 12/30/21  - [facility]  verbal instructions/portal instructions/email instructions - s    EYE SURGERY      due to running tears    FRACTURE SURGERY Left     hand 5th digit    HYSTERECTOMY      KNEE SURGERY Left 2016    hematoma    LIVER BIOPSY  10/24/2018     "Minimal steatosis, predominantly macrovesicular, 1%, Minimal nonspecific portal inflammation, no fibrosis. No findings on biopsy to explain elevated bilirubin levels. Could be d/t Gilbert's =?- hemolysis    RIGHT HEART CATHETERIZATION Right 2021    Procedure: INSERTION, CATHETER, RIGHT HEART;  Surgeon: Irving Cardenas MD;  Location: [facility] CATH LAB;  Service: Cardiology;  Laterality: Right;    RIGHT HEART CATHETERIZATION Right 2022    Procedure: INSERTION, CATHETER, RIGHT HEART;  Surgeon: Burke Camilo MD;  Location: [facility] CATH LAB;  Service: Cardiology;  Laterality: Right;    RIGHT HEART CATHETERIZATION Right 3/29/2023    Procedure: INSERTION, CATHETER, RIGHT HEART;  Surgeon: Katie Liriano DO;  Location: [facility] CATH LAB;  Service: Cardiology;  Laterality: Right;    TRANSJUGULAR BIOPSY OF LIVER N/A 10/24/2018    Procedure: BIOPSY, LIVER, TRANSJUGULAR APPROACH;  Surgeon: Carmen Diagnostic Provider;  Location: [facility] OR [address] [place] [address];  Service: Radiology;  Laterality: N/A;        Family History   Problem Relation Age of Onset    Cancer Mother         pancreatic CA early 50's    Heart disease Father          MI in late 50's    Hypertension Father     Heart attack Father     Heart disease Sister     Heart disease Brother     Cirrhosis Brother         alcoholic    Heart disease Sister     Heart disease Brother     Hypertension Brother     Diabetes Brother         Review of patient's allergies indicates:   Allergen Reactions    Penicillins Hives and Other (See Comments)    Iodinated contrast media Nausea And Vomiting    Oxycodone-acetaminophen Other (See Comments)     Nausea, Dizziness, Anxiety.  "I don't like how it makes me feel."   Given Hydromorphone 0.5mg IVP  Without problems.  Other reaction(s): Other (See Comments)    Clonazepam Other (See Comments)    Diovan hct [valsartan-hydrochlorothiazide] Other (See Comments)     Causes coughing    Iodine Other (See Comments)    Irinotecan      Pt has " homozygosity for the TA7 promoter variant that places this individual at significantly increased risk for   severe neutropenia (grade 4) when treated with the standard dose of irinotecan (risk approximately 50%).   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 Metabolism of other drugs not listed here may also be impacted by UGT1A1 enzyme activity.       Tramadol Nausea And Vomiting and Other (See Comments)     Other reaction(s): Other (See Comments)    Valsartan Other (See Comments)        Current Outpatient Medications   Medication Instructions    albuterol-ipratropium (DUO-NEB) 2.5 mg-0.5 mg/3 mL nebulizer solution 3 mLs, Nebulization, 2 times daily    allopurinoL (ZYLOPRIM) 100 mg, Oral, Daily PRN    amiodarone (PACERONE) 200 mg, Oral    atorvastatin (LIPITOR) 40 mg, Oral, Nightly    b complex vitamins tablet 1 tablet, Oral, Daily    blood sugar diagnostic (ACCU-CHEK GUIDE TEST STRIPS) Strp 1 strip, Other, 3 times daily    bumetanide (BUMEX) 2 MG tablet Take 2mg in the morning and 1mg in the evening    cetirizine (ZYRTEC) 10 mg, Oral, Daily PRN    diclofenac sodium (VOLTAREN) 1 % Gel APPLY 2 GRAMS TOPICALLY 3 (THREE) TIMES DAILY AS NEEDED (PAIN).    dobutamine HCl (DOBUTAMINE IV) Intravenous    ELIQUIS 5 mg Tab TAKE 1 TABLET (5 MG TOTAL) BY MOUTH 2 (TWO) TIMES DAILY.    empagliflozin (JARDIANCE) 10 mg, Oral, Daily    ergocalciferol (ERGOCALCIFEROL) 50,000 Units, Oral, Every Saturday    EScitalopram oxalate (LEXAPRO) 10 mg, Oral, Daily    ferrous sulfate (FEOSOL) 325 mg (65 mg iron) Tab tablet TAKE 1 TABLET ORALLY 2 TIMES A DAY AFTER FOOD.    fluticasone propionate (FLONASE) 50 mcg/actuation nasal spray 2 sprays, Each Nostril, Daily PRN    HYDROcodone-acetaminophen (NORCO) 7.5-325 mg per tablet 1 tablet, Oral, Every 8 hours PRN    isosorbide-hydrALAZINE 20-37.5 mg (BIDIL) 20-37.5 mg Tab 1 tablet,  "Oral, 3 times daily    lancets (ACCU-CHEK SOFTCLIX LANCETS) Misc 1 Device, Misc.(Non-Drug; Combo Route), 3 times daily    LIDOcaine (LIDODERM) 5 % 1 patch, Transdermal, Daily PRN, Remove & Discard patch within 12 hours or as directed by MD    mometasone-formoterol (DULERA) 200-5 mcg/actuation inhaler 2 puffs, Inhalation, 2 times daily, Controller    ondansetron (ZOFRAN-ODT) 8 mg, Oral, Every 12 hours PRN    pantoprazole (PROTONIX) 40 MG tablet TAKE 1 TABLET BY MOUTH DAILY IN THE MORNING    pregabalin (LYRICA) 50 mg, Oral, 2 times daily    rOPINIRole (REQUIP) 4 mg, Oral, Daily, Pt taking 4mg daily    sacubitriL-valsartan (ENTRESTO) 49-51 mg per tablet 1 tablet, Oral, 2 times daily    semaglutide (OZEMPIC) 0.5 mg, Subcutaneous, Every 7 days    SPIRIVA WITH HANDIHALER 18 mcg, Inhalation, Daily    traZODone (DESYREL) 25 mg, Oral, Nightly PRN    VENTOLIN HFA 90 mcg/actuation inhaler 2 puffs, Inhalation, Every 6 hours PRN        Vitals:    05/29/23 0900   BP: 99/63   Pulse: 90        Wt Readings from Last 3 Encounters:   05/29/23 83.8 kg (184 lb 11.9 oz)   05/15/23 84.6 kg (186 lb 8.2 oz)   05/10/23 85.3 kg (188 lb 0.8 oz)     Temp Readings from Last 3 Encounters:   05/22/23 97.7 °F (36.5 °C) (Skin)   05/19/23 97.4 °F (36.3 °C) (Skin)   05/15/23 97.5 °F (36.4 °C) (Tympanic)     BP Readings from Last 3 Encounters:   05/29/23 99/63   05/22/23 120/76   05/19/23 107/68     Pulse Readings from Last 3 Encounters:   05/29/23 90   05/22/23 89   05/19/23 89        Body mass index is 29.82 kg/m². Estimated body surface area is 1.98 meters squared as calculated from the following:    Height as of this encounter: 5' 6" (1.676 m).    Weight as of this encounter: 83.8 kg (184 lb 11.9 oz).     Physical Exam  Constitutional:       Appearance: She is well-developed.   HENT:      Head: Normocephalic and atraumatic.      Right Ear: External ear normal.      Left Ear: External ear normal.   Eyes:      Conjunctiva/sclera: Conjunctivae normal. "      Pupils: Pupils are equal, round, and reactive to light.   Neck:      Vascular: No hepatojugular reflux or JVD.   Cardiovascular:      Rate and Rhythm: Normal rate and regular rhythm.      Pulses: Intact distal pulses.      Heart sounds: S1 normal and S2 normal. No murmur heard.    No friction rub. No gallop.   Pulmonary:      Effort: Pulmonary effort is normal.      Breath sounds: Normal breath sounds.   Abdominal:      General: Bowel sounds are normal. There is no distension.      Palpations: Abdomen is soft.      Tenderness: There is no abdominal tenderness. There is no guarding or rebound.   Musculoskeletal:      Cervical back: Normal range of motion and neck supple.      Right lower leg: No edema.      Left lower leg: No edema.   Neurological:      Mental Status: She is alert and oriented to person, place, and time.        Lab Results   Component Value Date     (H) 05/15/2023     05/22/2023    K 3.8 05/22/2023    MG 2.4 05/15/2023     05/22/2023    CO2 24 05/22/2023    BUN 40 (H) 05/22/2023    CREATININE 2.1 (H) 05/22/2023     (H) 05/22/2023    HGBA1C 5.9 (H) 05/11/2023    AST 22 05/22/2023    ALT 13 05/22/2023    ALBUMIN 3.8 05/22/2023    PROT 6.7 05/22/2023    BILITOT 1.7 (H) 05/22/2023    WBC 3.39 (L) 05/22/2023    HGB 9.6 (L) 05/22/2023    HCT 32.8 (L) 05/22/2023    HCT 45 12/07/2021     (L) 05/22/2023    INR 1.0 05/02/2023     (H) 01/04/2023    TSH 1.407 01/04/2023    CHOL 136 01/04/2023    HDL 63 01/04/2023    LDLCALC 57.8 (L) 01/04/2023    TRIG 76 01/04/2023    D2JHJYI 8.4 09/11/2017    FREET4 1.07 05/09/2018       @LABRCNTIP(cpk,cpkmb,troponini,mb)@     No results found for this visit on 05/29/23.       Results for orders placed during the hospital encounter of 03/26/23    Echo    Interpretation Summary  · The left ventricle is severely enlarged with eccentric hypertrophy and severely decreased systolic function. The estimated ejection fraction is 15%.  ·  Normal right ventricular size with moderately reduced right ventricular systolic function.  · Grade II left ventricular diastolic dysfunction.  · Biatrial enlargement.  · Mild mitral regurgitation.  · Small pericardial effusion.  · The estimated PA systolic pressure is 51 mmHg (by tricuspid regurgitation velocity). The estimated mean PA pressure is 39mm Hg.  · Intermediate central venous pressure (8 mmHg).        Results for orders placed during the hospital encounter of 03/26/23    Cardiac catheterization    Conclusion  Summary  Filling pressures: RA 9, PCWP 16  PA 58/29, mean PA pressure 39 mmHg  PA saturation 45%, Ao saturation 90% on RA  PVR 5.6 BOLDEN, consistent with pre and post-capillary pulmonary hypertension  Gillian CO/CI: 4.1/2.03  SVR: 1151  BP 88/58 , MAP 68, HR 75 SR  Hb 10.7  Jayson 3.2  No Nipride challenge performed for PA pressures and elevated PVR given BP         Assessment and Plan:  Pulmonary hypertension    Restrictive lung disease    Chronic combined systolic and diastolic heart failure    Essential hypertension    ICD (implantable cardioverter-defibrillator) in place          Chronic systolic HF, NYHA class III, stage D. In home dobutamine 2.5 mcg/kg/min.  Etiology: NICM.  Devices: ICD.  Hemodynamic status: warm, normotensive, euvolemic.  Clinical course: multiple HF hospitalizations.  Plan:  -follow up with local cardiologist. She has coming appointment with Dr. Cortez in 4 days from now.  -patient is working on getting financial support for evaluation at other centers. She has relatives in [place], [place].  I encouraged her to look for second evaluation at other centers.       She is no longer under consideration for any advanced cardiac therapies.

## 2023-05-29 NOTE — PROGRESS NOTES
0930  Right upper arm Picc line dressing change done using sterile technique   No signs of infection noted.  Tegaderm, stat lock device and bio patch reapplied   Tolerated well

## 2023-05-29 NOTE — LETTER
May 29, 2023        Benson Cortez  [address] [facility] [place] [address]  Phone: [phone]  Fax: [phone]     Krysta Tony  [address] [facility] [address]  Phone: [phone]  Fax: [phone]     Andrea Pacheco  [address] [facility]  [address]  [place] [address]  Phone: [phone]  Fax: [phone]             [id number]  [address]  [place] [address]  Phone: [phone]   Patient: Hafsa Hawley   MR Number: [id number]   YOB: 1965   Date of Visit: 5/29/2023       Dear Dr. Andrea Pacheco, Benson Cortez, Krysta Tony    Thank you for referring Hafsa Hawley to me for evaluation. Attached you will find relevant portions of my assessment and plan of care.    If you have questions, please do not hesitate to call me. I look forward to following Hafsa Hawley along with you.    Sincerely,    Burke Camilo MD    Enclosure    If you would like to receive this communication electronically, please contact [email or web address] or [phone] to request [employer] access.    [employer] is a tool which provides read-only access to select patient information with whom you have a relationship. Its easy to use and provides real time access to review your patients record including encounter summaries, notes, results, and demographic information.    If you feel you have received this communication in error or would no longer like to receive these types of communications, please e-mail [email or web address]

## 2023-05-31 DIAGNOSIS — M67.40 GANGLION CYST: ICD-10-CM

## 2023-05-31 DIAGNOSIS — M25.532 LEFT WRIST PAIN: ICD-10-CM

## 2023-05-31 DIAGNOSIS — M77.12 LEFT LATERAL EPICONDYLITIS: ICD-10-CM

## 2023-05-31 RX ORDER — DICLOFENAC SODIUM 10 MG/G
GEL TOPICAL
Qty: 300 G | Refills: 1 | Status: SHIPPED | OUTPATIENT
Start: 2023-05-31 | End: 2023-08-02

## 2023-05-31 NOTE — TELEPHONE ENCOUNTER
Refill Routing Note   Medication(s) are not appropriate for processing by [facility] for the following reason(s):      Medication outside of protocol  Responsible provider unclear    [facility] action(s):  Defer None identified     Medication Therapy Plan: NO PCP LISTED ON PROFILE      Appointments  past 12m or future 3m with PCP    Date Provider   Last Visit   5/3/2023 Cristobal Ann MD   Next Visit   6/6/2023 Cristobal Ann MD   ED visits in past 90 days: 5        Note composed:6:03 AM 05/31/2023

## 2023-06-05 ENCOUNTER — INFUSION (OUTPATIENT)
Dept: INFUSION THERAPY | Facility: HOSPITAL | Age: 58
End: 2023-06-05
Attending: INTERNAL MEDICINE
Payer: MEDICAID

## 2023-06-05 VITALS
DIASTOLIC BLOOD PRESSURE: 75 MMHG | RESPIRATION RATE: 18 BRPM | HEART RATE: 89 BPM | TEMPERATURE: 98 F | SYSTOLIC BLOOD PRESSURE: 104 MMHG

## 2023-06-05 NOTE — PROGRESS NOTES
1130  Picc line dressing change done to right upper arm using sterile technique.  Stat lock device, bio patch and tegaderm reapplied.  Tolerated well  dc to home in stable condition

## 2023-06-06 ENCOUNTER — TELEPHONE (OUTPATIENT)
Dept: FAMILY MEDICINE | Facility: CLINIC | Age: 58
End: 2023-06-06

## 2023-06-06 ENCOUNTER — OFFICE VISIT (OUTPATIENT)
Dept: FAMILY MEDICINE | Facility: CLINIC | Age: 58
End: 2023-06-06
Payer: MEDICAID

## 2023-06-06 VITALS
WEIGHT: 182.13 LBS | BODY MASS INDEX: 29.27 KG/M2 | DIASTOLIC BLOOD PRESSURE: 62 MMHG | SYSTOLIC BLOOD PRESSURE: 90 MMHG | HEIGHT: 66 IN | RESPIRATION RATE: 12 BRPM | HEART RATE: 96 BPM

## 2023-06-06 DIAGNOSIS — I50.20 ACC/AHA STAGE D SYSTOLIC HEART FAILURE: ICD-10-CM

## 2023-06-06 DIAGNOSIS — M25.561 CHRONIC PAIN OF RIGHT KNEE: ICD-10-CM

## 2023-06-06 DIAGNOSIS — G89.29 CHRONIC PAIN OF RIGHT KNEE: ICD-10-CM

## 2023-06-06 DIAGNOSIS — M51.36 LUMBAR DEGENERATIVE DISC DISEASE: ICD-10-CM

## 2023-06-06 DIAGNOSIS — M10.9 GOUT, ARTHRITIS: ICD-10-CM

## 2023-06-06 DIAGNOSIS — M51.36 LUMBAR DEGENERATIVE DISC DISEASE: Primary | ICD-10-CM

## 2023-06-06 PROCEDURE — 99213 OFFICE O/P EST LOW 20 MIN: CPT | Mod: S$PBB,,, | Performed by: STUDENT IN AN ORGANIZED HEALTH CARE EDUCATION/TRAINING PROGRAM

## 2023-06-06 PROCEDURE — 99215 OFFICE O/P EST HI 40 MIN: CPT | Mod: PBBFAC | Performed by: STUDENT IN AN ORGANIZED HEALTH CARE EDUCATION/TRAINING PROGRAM

## 2023-06-06 PROCEDURE — 1159F PR MEDICATION LIST DOCUMENTED IN MEDICAL RECORD: ICD-10-PCS | Mod: CPTII,,, | Performed by: STUDENT IN AN ORGANIZED HEALTH CARE EDUCATION/TRAINING PROGRAM

## 2023-06-06 PROCEDURE — 4010F PR ACE/ARB THEARPY RXD/TAKEN: ICD-10-PCS | Mod: CPTII,,, | Performed by: STUDENT IN AN ORGANIZED HEALTH CARE EDUCATION/TRAINING PROGRAM

## 2023-06-06 PROCEDURE — 3044F PR MOST RECENT HEMOGLOBIN A1C LEVEL <7.0%: ICD-10-PCS | Mod: CPTII,,, | Performed by: STUDENT IN AN ORGANIZED HEALTH CARE EDUCATION/TRAINING PROGRAM

## 2023-06-06 PROCEDURE — 1111F PR DISCHARGE MEDS RECONCILED W/ CURRENT OUTPATIENT MED LIST: ICD-10-PCS | Mod: CPTII,,, | Performed by: STUDENT IN AN ORGANIZED HEALTH CARE EDUCATION/TRAINING PROGRAM

## 2023-06-06 PROCEDURE — 1111F DSCHRG MED/CURRENT MED MERGE: CPT | Mod: CPTII,,, | Performed by: STUDENT IN AN ORGANIZED HEALTH CARE EDUCATION/TRAINING PROGRAM

## 2023-06-06 PROCEDURE — 3078F DIAST BP <80 MM HG: CPT | Mod: CPTII,,, | Performed by: STUDENT IN AN ORGANIZED HEALTH CARE EDUCATION/TRAINING PROGRAM

## 2023-06-06 PROCEDURE — 3008F BODY MASS INDEX DOCD: CPT | Mod: CPTII,,, | Performed by: STUDENT IN AN ORGANIZED HEALTH CARE EDUCATION/TRAINING PROGRAM

## 2023-06-06 PROCEDURE — 99999 PR PBB SHADOW E&M-EST. PATIENT-LVL V: ICD-10-PCS | Mod: PBBFAC,,, | Performed by: STUDENT IN AN ORGANIZED HEALTH CARE EDUCATION/TRAINING PROGRAM

## 2023-06-06 PROCEDURE — 3008F PR BODY MASS INDEX (BMI) DOCUMENTED: ICD-10-PCS | Mod: CPTII,,, | Performed by: STUDENT IN AN ORGANIZED HEALTH CARE EDUCATION/TRAINING PROGRAM

## 2023-06-06 PROCEDURE — 99213 PR OFFICE/OUTPT VISIT, EST, LEVL III, 20-29 MIN: ICD-10-PCS | Mod: S$PBB,,, | Performed by: STUDENT IN AN ORGANIZED HEALTH CARE EDUCATION/TRAINING PROGRAM

## 2023-06-06 PROCEDURE — 3074F PR MOST RECENT SYSTOLIC BLOOD PRESSURE < 130 MM HG: ICD-10-PCS | Mod: CPTII,,, | Performed by: STUDENT IN AN ORGANIZED HEALTH CARE EDUCATION/TRAINING PROGRAM

## 2023-06-06 PROCEDURE — 3044F HG A1C LEVEL LT 7.0%: CPT | Mod: CPTII,,, | Performed by: STUDENT IN AN ORGANIZED HEALTH CARE EDUCATION/TRAINING PROGRAM

## 2023-06-06 PROCEDURE — 3078F PR MOST RECENT DIASTOLIC BLOOD PRESSURE < 80 MM HG: ICD-10-PCS | Mod: CPTII,,, | Performed by: STUDENT IN AN ORGANIZED HEALTH CARE EDUCATION/TRAINING PROGRAM

## 2023-06-06 PROCEDURE — 1159F MED LIST DOCD IN RCRD: CPT | Mod: CPTII,,, | Performed by: STUDENT IN AN ORGANIZED HEALTH CARE EDUCATION/TRAINING PROGRAM

## 2023-06-06 PROCEDURE — 4010F ACE/ARB THERAPY RXD/TAKEN: CPT | Mod: CPTII,,, | Performed by: STUDENT IN AN ORGANIZED HEALTH CARE EDUCATION/TRAINING PROGRAM

## 2023-06-06 PROCEDURE — 99999 PR PBB SHADOW E&M-EST. PATIENT-LVL V: CPT | Mod: PBBFAC,,, | Performed by: STUDENT IN AN ORGANIZED HEALTH CARE EDUCATION/TRAINING PROGRAM

## 2023-06-06 PROCEDURE — 3074F SYST BP LT 130 MM HG: CPT | Mod: CPTII,,, | Performed by: STUDENT IN AN ORGANIZED HEALTH CARE EDUCATION/TRAINING PROGRAM

## 2023-06-06 RX ORDER — LIDOCAINE AND PRILOCAINE, LIDOCAINE 5-2.5-2.5%
KIT TOPICAL
Refills: 5 | OUTPATIENT
Start: 2023-06-06

## 2023-06-06 NOTE — PROGRESS NOTES
Subjective:       Patient ID: Hafsa Hawley is a 57 y.o. female.    Chief Complaint: Follow-up (PT here for checkup would like to see if she can be referred out for second opinion on transplant. )    Pt here for follow-up    End stage heart failure: she was recently seen by [facility] transplant and is no longer being considered for any advanced cardiac therapies. She is undergoing home dobutamine treatment. They asked her to transfer all continued cardiac care to her local cardiologist at [facility]. Pt would like to weight all options including out of state transplant centers, she is requesting a referral.    Pt also states having issue with lidocaine patches having a recall, requesting alternative for her back pain.     She is followed by ortho for chronic right knee pain. Recent joint aspiration with findings consistent with gout. She denies redness or erythema. She is on allopurinol. We have not increased due to concern for CKD. She uses norco chronically.    Review of Systems   Constitutional:  Positive for chills. Negative for fever.   HENT:  Negative for congestion and sore throat.    Respiratory:  Positive for shortness of breath.    Cardiovascular:  Positive for leg swelling. Negative for chest pain.   Gastrointestinal:  Positive for nausea (chronic).   Genitourinary:  Negative for dysuria and hematuria.   Musculoskeletal:  Positive for arthralgias and joint swelling.     Objective:      Physical Exam  Vitals reviewed.   HENT:      Head: Normocephalic and atraumatic.   Eyes:      Conjunctiva/sclera: Conjunctivae normal.   Cardiovascular:      Rate and Rhythm: Normal rate and regular rhythm.      Heart sounds: Normal heart sounds. No murmur heard.  Pulmonary:      Effort: Pulmonary effort is normal. No respiratory distress.      Breath sounds: Normal breath sounds.   Musculoskeletal:         General: No deformity.      Right lower leg: No edema.      Left lower leg: No edema.   Neurological:      Mental Status:  She is alert and oriented to person, place, and time.   Psychiatric:         Mood and Affect: Mood normal.         Behavior: Behavior normal.      Comments: Depressed mood and affect       Assessment:       1. Lumbar degenerative disc disease    2. ACC/AHA stage D systolic heart failure    3. Chronic pain of right knee    4. Gout, arthritis        Plan:           1. Lumbar degenerative disc disease  -     LIDOcaine-prilocaine 5-2.5-2.5 % PaCr; Apply 1 patch topically once daily.  Dispense: 30 each; Refill: 5    2. ACC/AHA stage D systolic heart failure  -     Ambulatory referral/consult to Transplant Cardiologist; Future; Expected date: 06/13/2023    3. Chronic pain of right knee    4. Gout, arthritis    Lidocaine/prilocaine patch as ordered  Follow-up cardiology as scheduled  Follow-up ortho as scheduled  RTC 3 months or sooner if needed

## 2023-06-06 NOTE — TELEPHONE ENCOUNTER
Pharmacy is requesting that a PRIOR AUTHORIZATION be completed for the PRILO PATCH 5-2.5-2.5 % PaCr. Please forward this request to the staff member handling PAs for your clinic. Thank you.      Note composed:6:20 PM 06/06/2023

## 2023-06-12 ENCOUNTER — INFUSION (OUTPATIENT)
Dept: INFUSION THERAPY | Facility: HOSPITAL | Age: 58
End: 2023-06-12
Attending: INTERNAL MEDICINE
Payer: MEDICAID

## 2023-06-12 VITALS
SYSTOLIC BLOOD PRESSURE: 86 MMHG | RESPIRATION RATE: 18 BRPM | HEART RATE: 86 BPM | TEMPERATURE: 98 F | DIASTOLIC BLOOD PRESSURE: 57 MMHG

## 2023-06-12 NOTE — PROGRESS NOTES
1100  Right upper arm PICC line dressing change done using sterile technique.  No signs of infection noted.  Stat lock device, biopatch, and tegaderm dressing reapplied.  Tolerated well   dc to home in stable condition

## 2023-06-14 DIAGNOSIS — I50.42 CHRONIC COMBINED SYSTOLIC AND DIASTOLIC HEART FAILURE: Primary | ICD-10-CM

## 2023-06-16 ENCOUNTER — PATIENT MESSAGE (OUTPATIENT)
Dept: PODIATRY | Facility: CLINIC | Age: 58
End: 2023-06-16
Payer: MEDICAID

## 2023-06-19 ENCOUNTER — INFUSION (OUTPATIENT)
Dept: INFUSION THERAPY | Facility: HOSPITAL | Age: 58
End: 2023-06-19
Attending: INTERNAL MEDICINE
Payer: MEDICAID

## 2023-06-19 ENCOUNTER — OFFICE VISIT (OUTPATIENT)
Dept: CARDIOLOGY | Facility: CLINIC | Age: 58
End: 2023-06-19
Payer: MEDICAID

## 2023-06-19 VITALS
BODY MASS INDEX: 29.89 KG/M2 | WEIGHT: 186 LBS | RESPIRATION RATE: 18 BRPM | SYSTOLIC BLOOD PRESSURE: 118 MMHG | HEART RATE: 92 BPM | OXYGEN SATURATION: 100 % | HEIGHT: 66 IN | DIASTOLIC BLOOD PRESSURE: 80 MMHG

## 2023-06-19 VITALS
DIASTOLIC BLOOD PRESSURE: 78 MMHG | TEMPERATURE: 98 F | HEART RATE: 105 BPM | SYSTOLIC BLOOD PRESSURE: 118 MMHG | RESPIRATION RATE: 18 BRPM

## 2023-06-19 DIAGNOSIS — I51.7 LEFT VENTRICULAR HYPERTROPHY: Chronic | ICD-10-CM

## 2023-06-19 DIAGNOSIS — I47.10 PAROXYSMAL SUPRAVENTRICULAR TACHYCARDIA: Chronic | ICD-10-CM

## 2023-06-19 DIAGNOSIS — I50.42 CHRONIC COMBINED SYSTOLIC AND DIASTOLIC HEART FAILURE: Chronic | ICD-10-CM

## 2023-06-19 DIAGNOSIS — Z51.5 PALLIATIVE CARE ENCOUNTER: ICD-10-CM

## 2023-06-19 DIAGNOSIS — I42.8 NICM (NONISCHEMIC CARDIOMYOPATHY): Chronic | ICD-10-CM

## 2023-06-19 DIAGNOSIS — I50.43 ACUTE ON CHRONIC COMBINED SYSTOLIC AND DIASTOLIC HEART FAILURE: ICD-10-CM

## 2023-06-19 DIAGNOSIS — I10 ESSENTIAL HYPERTENSION: Primary | Chronic | ICD-10-CM

## 2023-06-19 DIAGNOSIS — I70.0 AORTIC ATHEROSCLEROSIS: ICD-10-CM

## 2023-06-19 DIAGNOSIS — I50.20 ACC/AHA STAGE D SYSTOLIC HEART FAILURE: ICD-10-CM

## 2023-06-19 DIAGNOSIS — I13.0 HYPERTENSIVE CARDIOVASCULAR-RENAL DISEASE, STAGE 1-4 OR UNSPECIFIED CHRONIC KIDNEY DISEASE, WITH HEART FAILURE: ICD-10-CM

## 2023-06-19 DIAGNOSIS — R94.31 PROLONGED Q-T INTERVAL ON ECG: ICD-10-CM

## 2023-06-19 DIAGNOSIS — I24.89 DEMAND ISCHEMIA: ICD-10-CM

## 2023-06-19 DIAGNOSIS — I50.9 ACUTE DECOMPENSATED HEART FAILURE: ICD-10-CM

## 2023-06-19 DIAGNOSIS — I48.0 PAROXYSMAL ATRIAL FIBRILLATION: Chronic | ICD-10-CM

## 2023-06-19 DIAGNOSIS — Z95.810 ICD (IMPLANTABLE CARDIOVERTER-DEFIBRILLATOR) IN PLACE: Chronic | ICD-10-CM

## 2023-06-19 DIAGNOSIS — R79.89 ELEVATED TROPONIN: ICD-10-CM

## 2023-06-19 DIAGNOSIS — I47.29 NSVT (NONSUSTAINED VENTRICULAR TACHYCARDIA): ICD-10-CM

## 2023-06-19 DIAGNOSIS — I51.7 ATRIAL ENLARGEMENT, BILATERAL: ICD-10-CM

## 2023-06-19 PROCEDURE — 3044F PR MOST RECENT HEMOGLOBIN A1C LEVEL <7.0%: ICD-10-PCS | Mod: CPTII,,, | Performed by: NURSE PRACTITIONER

## 2023-06-19 PROCEDURE — 3044F HG A1C LEVEL LT 7.0%: CPT | Mod: CPTII,,, | Performed by: NURSE PRACTITIONER

## 2023-06-19 PROCEDURE — 3079F PR MOST RECENT DIASTOLIC BLOOD PRESSURE 80-89 MM HG: ICD-10-PCS | Mod: CPTII,,, | Performed by: NURSE PRACTITIONER

## 2023-06-19 PROCEDURE — 99214 PR OFFICE/OUTPT VISIT, EST, LEVL IV, 30-39 MIN: ICD-10-PCS | Mod: S$PBB,,, | Performed by: NURSE PRACTITIONER

## 2023-06-19 PROCEDURE — 99999 PR PBB SHADOW E&M-EST. PATIENT-LVL IV: CPT | Mod: PBBFAC,,, | Performed by: NURSE PRACTITIONER

## 2023-06-19 PROCEDURE — 99999 PR PBB SHADOW E&M-EST. PATIENT-LVL IV: ICD-10-PCS | Mod: PBBFAC,,, | Performed by: NURSE PRACTITIONER

## 2023-06-19 PROCEDURE — 3008F PR BODY MASS INDEX (BMI) DOCUMENTED: ICD-10-PCS | Mod: CPTII,,, | Performed by: NURSE PRACTITIONER

## 2023-06-19 PROCEDURE — 3074F PR MOST RECENT SYSTOLIC BLOOD PRESSURE < 130 MM HG: ICD-10-PCS | Mod: CPTII,,, | Performed by: NURSE PRACTITIONER

## 2023-06-19 PROCEDURE — 99214 OFFICE O/P EST MOD 30 MIN: CPT | Mod: S$PBB,,, | Performed by: NURSE PRACTITIONER

## 2023-06-19 PROCEDURE — 99214 OFFICE O/P EST MOD 30 MIN: CPT | Mod: PBBFAC | Performed by: NURSE PRACTITIONER

## 2023-06-19 PROCEDURE — 4010F ACE/ARB THERAPY RXD/TAKEN: CPT | Mod: CPTII,,, | Performed by: NURSE PRACTITIONER

## 2023-06-19 PROCEDURE — 4010F PR ACE/ARB THEARPY RXD/TAKEN: ICD-10-PCS | Mod: CPTII,,, | Performed by: NURSE PRACTITIONER

## 2023-06-19 PROCEDURE — 3074F SYST BP LT 130 MM HG: CPT | Mod: CPTII,,, | Performed by: NURSE PRACTITIONER

## 2023-06-19 PROCEDURE — 3008F BODY MASS INDEX DOCD: CPT | Mod: CPTII,,, | Performed by: NURSE PRACTITIONER

## 2023-06-19 PROCEDURE — 3079F DIAST BP 80-89 MM HG: CPT | Mod: CPTII,,, | Performed by: NURSE PRACTITIONER

## 2023-06-19 PROCEDURE — 36415 COLL VENOUS BLD VENIPUNCTURE: CPT

## 2023-06-19 RX ORDER — NITROGLYCERIN 0.4 MG/1
TABLET SUBLINGUAL
Status: ON HOLD | COMMUNITY
Start: 2022-09-27 | End: 2023-06-30 | Stop reason: HOSPADM

## 2023-06-19 RX ORDER — ALPRAZOLAM 2 MG/1
TABLET ORAL
Status: ON HOLD | COMMUNITY
Start: 2023-03-14 | End: 2023-06-30 | Stop reason: HOSPADM

## 2023-06-19 RX ORDER — ONDANSETRON 4 MG/1
TABLET, FILM COATED ORAL
Status: ON HOLD | COMMUNITY
Start: 2023-06-12 | End: 2023-06-30 | Stop reason: HOSPADM

## 2023-06-19 RX ORDER — PROMETHAZINE HYDROCHLORIDE AND CODEINE PHOSPHATE 6.25; 1 MG/5ML; MG/5ML
SOLUTION ORAL
Status: ON HOLD | COMMUNITY
Start: 2023-06-12 | End: 2023-06-21 | Stop reason: HOSPADM

## 2023-06-19 NOTE — PROGRESS NOTES
Cardiology Clinic note    Subjective:   Patient ID:  Hafsa Hawley is a 57 y.o. female who presents for follow-up of HFrEF    HPI:   Hafsa Hawley  has a past medical history of Anemia, Arthritis, Atrial fibrillation, Chronic respiratory failure with hypoxia, on home oxygen therapy, CKD (chronic kidney disease), stage IV (05/08/2018), Congestive heart failure, Deep vein thrombosis, Depression, elevated bilirubin d/t Gilbert's syndrome, Encounter for blood transfusion, GERD (gastroesophageal reflux disease), Hypertension, Pheochromocytoma, malignant, Right kidney mass, Sleep apnea, Thalassemia trait, alpha, Thyroid disease, and Type 2 diabetes mellitus with hyperglycemia, without long-term current use of insulin (08/13/2020).    HFrEF  Chronic combined systolic and diastolic HF; EF 15%, grade II DD  NYHA class 4  Multiple hospital admission this year; medication noncompliance  Declined for heart-kidney transplant due to renal, lung function and difficulty following up with the team; currently looking for second opinion out of state  Does not know cardiac medications; takes bumex, entresto, bidil, jardiance  Does not weight herself daily   Recent admission 5/11/2023  Currently on palliative Dobutamine     ICD  NSVT  Persistent AF  No recent fires  On Eliquis, amipdarone    Chronic Resp failure  Portable home O2 PRN  Restrictive lung disease; reduced PFTs  COPD    CKD stage 4  Creatinine 2.1    CAD  Mild, chronic   Stable    DMT2  A1c 5.9    MELISSA  No BIPAP/CPAP    Presents today for follow up   Reports medication compliance   Denies CP, SOB, orthopnea, PND, BLE swelling      Patient Active Problem List    Diagnosis Date Noted    Atrial enlargement, bilateral 06/20/2023    Gout, arthritis 06/06/2023    Chronic pain of right knee 06/06/2023    Palliative care encounter 05/15/2023    ACC/AHA stage D systolic heart failure 05/15/2023    Debility 05/15/2023    Dyspnea 05/15/2023    Advance care planning 05/15/2023     COPD exacerbation 04/27/2023    Asymptomatic bacteriuria 04/27/2023    Chills 03/26/2023    Aortic atherosclerosis 02/22/2023    Pulmonary hypertension 02/22/2023    Class 1 obesity due to excess calories without serious comorbidity in adult 02/22/2023    Pulmonary HTN 01/06/2023    Demand ischemia 01/04/2023    Acute decompensated heart failure 11/18/2022    Prolonged Q-T interval on ECG 11/16/2022    NSVT (nonsustained ventricular tachycardia) 11/02/2022    CKD (chronic kidney disease), stage IV 08/23/2022    Restrictive lung disease 04/26/2022    Hypertensive cardiovascular-renal disease, stage 1-4 or unspecified chronic kidney disease, with heart failure 03/04/2022    Elevated serum immunoglobulin free light chains 02/23/2022    Acute on chronic combined systolic and diastolic heart failure 11/30/2021    Muscle twitch 11/02/2021    [place] (acute kidney injury) 10/28/2021    DVT prophylaxis 05/17/2021     IMO Regualtory Update 4/1/23      Shortness of breath 05/16/2021    Chronic respiratory failure with hypoxia and hypercapnia 03/16/2021    Type 2 diabetes mellitus with stage 4 chronic kidney disease, with long-term current use of insulin 08/13/2020    Leukopenia 08/13/2020    Screen for colon cancer 03/29/2019    elevated bilirubin d/t Gilbert's syndrome      confirmed by [place] genetic testing, evaluated by hepatology      Paroxysmal supraventricular tachycardia 08/20/2018    ICD (implantable cardioverter-defibrillator) in place 07/24/2018    Depression due to physical illness 05/08/2018    Left ventricular hypertrophy     Chronic combined systolic and diastolic heart failure 03/16/2018     I told her that since she is not a candidate for advanced options her local cardiologist Dr. Benson Cortez can follow her locally.  She is due to see him this week and I will pass this note along for his perusal.  She does not need to return to [facility] for heart failure care less Dr. Cortez can not offer her home  dobutamine and she is willing to initiate this palliative therapy.  At this point she is not willing to do so plans to go home and Google information on dobutamine.    Listed for transplant: No and no longer under consideration for advanced options      Lumbar degenerative disc disease 06/07/2017    Adrenal mass 1 cm to 4 cm in diameter 04/19/2017    NICM (nonischemic cardiomyopathy) 10/27/2016    Paroxysmal atrial fibrillation 08/25/2016    MELISSA (obstructive sleep apnea) 08/23/2016    Essential hypertension 07/22/2016    Elevated troponin 07/20/2016    Microcytic anemia 07/20/2016       Patient's Medications   New Prescriptions    PROMETHAZINE-CODEINE 6.25-10 MG/5 ML (PHENERGAN WITH CODEINE) 6.25-10 MG/5 ML SYRUP    TAKE 5 ML BY MOUTH EVERY 4-6 HOURS   Previous Medications    ALBUTEROL-IPRATROPIUM (DUO-NEB) 2.5 MG-0.5 MG/3 ML NEBULIZER SOLUTION    Take 3 mLs by nebulization 2 (two) times a day.    ALLOPURINOL (ZYLOPRIM) 100 MG TABLET    Take 1 tablet (100 mg total) by mouth daily as needed (gout attack).    ALPRAZOLAM (XANAX) 2 MG TAB    ALPRAZolam 2 mg tablet, [RxNorm: 490040]    AMIODARONE (PACERONE) 200 MG TAB    Take 200 mg by mouth.    ATORVASTATIN (LIPITOR) 40 MG TABLET    Take 1 tablet (40 mg total) by mouth every evening.    B COMPLEX VITAMINS TABLET    Take 1 tablet by mouth once daily.    BLOOD SUGAR DIAGNOSTIC (ACCU-CHEK GUIDE TEST STRIPS) STRP    1 strip by Other route 3 (three) times daily.    BUMETANIDE (BUMEX) 2 MG TABLET    Take 2mg in the morning and 1mg in the evening    CETIRIZINE (ZYRTEC) 10 MG TABLET    Take 10 mg by mouth daily as needed for Allergies.    DICLOFENAC SODIUM (VOLTAREN) 1 % GEL    APPLY 2 GRAMS TOPICALLY 3 (THREE) TIMES DAILY AS NEEDED (PAIN).    DOBUTAMINE HCL (DOBUTAMINE IV)    Inject into the vein.    ELIQUIS 5 MG TAB    TAKE 1 TABLET (5 MG TOTAL) BY MOUTH 2 (TWO) TIMES DAILY.    EMPAGLIFLOZIN (JARDIANCE) 10 MG TABLET    Take 1 tablet (10 mg total) by mouth once daily.     ERGOCALCIFEROL (ERGOCALCIFEROL) 50,000 UNIT CAP    Take 1 capsule (50,000 Units total) by mouth every Saturday.    ESCITALOPRAM OXALATE (LEXAPRO) 10 MG TABLET    Take 1 tablet (10 mg total) by mouth once daily.    FERROUS SULFATE (FEOSOL) 325 MG (65 MG IRON) TAB TABLET    TAKE 1 TABLET ORALLY 2 TIMES A DAY AFTER FOOD.    FLUTICASONE PROPIONATE (FLONASE) 50 MCG/ACTUATION NASAL SPRAY    2 sprays by Each Nostril route daily as needed for Rhinitis.     HYDROCODONE-ACETAMINOPHEN (NORCO) 7.5-325 MG PER TABLET    Take 1 tablet by mouth every 8 (eight) hours as needed for Pain.    ISOSORBIDE-HYDRALAZINE 20-37.5 MG (BIDIL) 20-37.5 MG TAB    Take 1 tablet by mouth 3 (three) times daily.    LANCETS (ACCU-CHEK SOFTCLIX LANCETS) MISC    1 Device by Misc.(Non-Drug; Combo Route) route 3 (three) times daily.    LIDOCAINE (LIDODERM) 5 %    Place 1 patch onto the skin daily as needed (pain). Remove & Discard patch within 12 hours or as directed by MD    LIDOCAINE-PRILOCAINE 5-2.5-2.5 % PACR    Apply 1 patch topically once daily.    MOMETASONE-FORMOTEROL (DULERA) 200-5 MCG/ACTUATION INHALER    Inhale 2 puffs into the lungs 2 (two) times daily. Controller    NITROGLYCERIN (NITROSTAT) 0.4 MG SL TABLET    Nitrostat 0.4 mg sublingual tablet, [RxNorm: 118761]    ONDANSETRON (ZOFRAN) 4 MG TABLET    ondansetron HCL 4 mg tablet, [RxNorm: 684963]    ONDANSETRON (ZOFRAN-ODT) 8 MG TBDL    Take 1 tablet (8 mg total) by mouth every 12 (twelve) hours as needed (nausea).    PANTOPRAZOLE (PROTONIX) 40 MG TABLET    TAKE 1 TABLET BY MOUTH DAILY IN THE MORNING    PREGABALIN (LYRICA) 50 MG CAPSULE    Take 1 capsule (50 mg total) by mouth 2 (two) times daily.    PROMETHAZINE-CODEINE 6.25-10 MG/5 ML (PHENERGAN WITH CODEINE) 6.25-10 MG/5 ML SYRUP    promethazine 6.25 mg-codeine 10 mg/5 mL syrup, [RxNorm: 711966]    ROPINIROLE (REQUIP) 4 MG TABLET    TAKE 1 TABLET (4 MG TOTAL) BY MOUTH ONCE DAILY. PT TAKING 4MG DAILY    SACUBITRIL-VALSARTAN (ENTRESTO) 49-51  "MG PER TABLET    Take 1 tablet by mouth 2 (two) times daily.    SEMAGLUTIDE 0.25 MG OR 0.5 MG (2 MG/3 ML) PNIJ    Inject 0.5 mg into the skin every 7 days.    SPIRIVA WITH HANDIHALER 18 MCG INHALATION CAPSULE    Inhale 1 capsule (18 mcg total) into the lungs once daily.    TRAZODONE (DESYREL) 50 MG TABLET    Take 25 mg by mouth nightly as needed.    VENTOLIN HFA 90 MCG/ACTUATION INHALER    Inhale 2 puffs into the lungs every 6 (six) hours as needed for Shortness of Breath or Wheezing.   Modified Medications    No medications on file   Discontinued Medications    No medications on file        Review of Systems   Constitutional: Negative.   Cardiovascular: Negative.    Skin: Negative.         IV to right am   Musculoskeletal: Negative.        Objective:   Vitals  Vitals:    06/19/23 1327   BP: 118/80   Pulse: 92   Resp: 18   SpO2: 100%   Weight: 84.4 kg (186 lb)   Height: 5' 6" (1.676 m)          Physical Exam  Constitutional:       Appearance: She is obese.   HENT:      Head: Normocephalic.   Cardiovascular:      Rate and Rhythm: Normal rate.      Pulses: Normal pulses.      Heart sounds: Normal heart sounds.   Pulmonary:      Effort: Pulmonary effort is normal.      Breath sounds: Normal breath sounds.   Skin:     General: Skin is warm.   Neurological:      Mental Status: She is alert and oriented to person, place, and time.   Psychiatric:         Mood and Affect: Mood normal.         Lab Results    Lab Results   Component Value Date    WBC 3.77 (L) 06/19/2023    HGB 9.1 (L) 06/19/2023    HCT 31.8 (L) 06/19/2023    HCT 45 12/07/2021    MCV 57 (L) 06/19/2023       Lab Results   Component Value Date     06/19/2023    INR 1.0 05/02/2023       Lab Results   Component Value Date    K 3.9 06/19/2023    MG 1.8 05/29/2023    BUN 48 (H) 06/19/2023    CREATININE 2.7 (H) 06/19/2023       Lab Results   Component Value Date    GLU 87 06/19/2023    HGBA1C 5.9 (H) 05/11/2023       Lab Results   Component Value Date    AST " 22 06/19/2023    ALT 13 06/19/2023    ALBUMIN 3.9 06/19/2023    PROT 6.8 06/19/2023       Lab Results   Component Value Date    CHOL 136 01/04/2023    HDL 63 01/04/2023    LDLCALC 57.8 (L) 01/04/2023    TRIG 76 01/04/2023       Lab Results   Component Value Date    CRP 0.5 01/04/2023     (H) 05/29/2023       Assessment:     Problem List Items Addressed This Visit          Cardiac/Vascular    Essential hypertension - Primary (Chronic)    Paroxysmal atrial fibrillation (Chronic)    NICM (nonischemic cardiomyopathy) (Chronic)    Chronic combined systolic and diastolic heart failure (Chronic)    Overview     I told her that since she is not a candidate for advanced options her local cardiologist Dr. Benson Cortez can follow her locally.  She is due to see him this week and I will pass this note along for his perusal.  She does not need to return to [facility] for heart failure care less Dr. Cortez can not offer her home dobutamine and she is willing to initiate this palliative therapy.  At this point she is not willing to do so plans to go home and Google information on dobutamine.    Listed for transplant: No and no longer under consideration for advanced options         Left ventricular hypertrophy (Chronic)    ICD (implantable cardioverter-defibrillator) in place (Chronic)    Paroxysmal supraventricular tachycardia (Chronic)    Elevated troponin    Acute on chronic combined systolic and diastolic heart failure    Hypertensive cardiovascular-renal disease, stage 1-4 or unspecified chronic kidney disease, with heart failure    NSVT (nonsustained ventricular tachycardia)    Prolonged Q-T interval on ECG    Acute decompensated heart failure    Demand ischemia    Aortic atherosclerosis    ACC/AHA stage D systolic heart failure    Atrial enlargement, bilateral       Palliative Care    Palliative care encounter       Plan:     Stable from cardiac standpoint   Euvolemic on exam    I will not make any medication  changes as she is doing well currently and was recently seen by primary cardiologist last week and has appointment next week   Primary cardiologist (Dr. Cortez with [facility])    She wishes to continue to see Dr. Cortez for her care  Follow up PRN     Continue with current medical plan and lifestyle changes.      No orders of the defined types were placed in this encounter.    Return sooner for concerns or questions. If symptoms persist go to the ED    She expressed verbal understanding and agreed with the plan    Thank you for the opportunity to care for this patient. Will be available for questions if needed.     Total duration of face to face visit time 30 minutes.  Total time spent counseling greater than fifty percent of total visit time.  Counseling included discussion regarding imaging findings, diagnosis, possibilities, treatment options, risks and benefits.    Tatiana Fortune, CLARISA-C  Cardiology Clinic  [facility]

## 2023-06-19 NOTE — PROGRESS NOTES
1315  Right upper arm PICC line dressing change done using sterile technique.  No signs of infection noted.  Bio patch, stat lock device and tegaderm dressing reapplied.  Tolerated well.

## 2023-06-20 ENCOUNTER — HOSPITAL ENCOUNTER (OUTPATIENT)
Facility: HOSPITAL | Age: 58
Discharge: HOME | End: 2023-06-21
Attending: EMERGENCY MEDICINE | Admitting: EMERGENCY MEDICINE
Payer: MEDICAID

## 2023-06-20 ENCOUNTER — TELEPHONE (OUTPATIENT)
Dept: TRANSPLANT | Facility: CLINIC | Age: 58
End: 2023-06-20
Payer: MEDICAID

## 2023-06-20 DIAGNOSIS — N17.9 AKI (ACUTE KIDNEY INJURY): ICD-10-CM

## 2023-06-20 DIAGNOSIS — R07.9 CHEST PAIN: ICD-10-CM

## 2023-06-20 DIAGNOSIS — I50.9 ACUTE DECOMPENSATED HEART FAILURE: Primary | ICD-10-CM

## 2023-06-20 DIAGNOSIS — I50.9 CHF (CONGESTIVE HEART FAILURE): ICD-10-CM

## 2023-06-20 DIAGNOSIS — R94.31 PROLONGED Q-T INTERVAL ON ECG: ICD-10-CM

## 2023-06-20 PROBLEM — I51.7 ATRIAL ENLARGEMENT, BILATERAL: Status: ACTIVE | Noted: 2023-06-20

## 2023-06-20 LAB
ALBUMIN SERPL BCP-MCNC: 4 G/DL (ref 3.5–5.2)
ALP SERPL-CCNC: 60 U/L (ref 55–135)
ALT SERPL W/O P-5'-P-CCNC: 13 U/L (ref 10–44)
ANION GAP SERPL CALC-SCNC: 12 MMOL/L (ref 8–16)
ANISOCYTOSIS BLD QL SMEAR: ABNORMAL
AST SERPL-CCNC: 22 U/L (ref 10–40)
BACTERIA #/AREA URNS AUTO: ABNORMAL /HPF
BASOPHILS # BLD AUTO: 0.02 K/UL (ref 0–0.2)
BASOPHILS NFR BLD: 0.5 % (ref 0–1.9)
BILIRUB SERPL-MCNC: 1.9 MG/DL (ref 0.1–1)
BILIRUB UR QL STRIP: NEGATIVE
BUN SERPL-MCNC: 39 MG/DL (ref 6–20)
CALCIUM SERPL-MCNC: 9.7 MG/DL (ref 8.7–10.5)
CHLORIDE SERPL-SCNC: 106 MMOL/L (ref 95–110)
CLARITY UR REFRACT.AUTO: CLEAR
CO2 SERPL-SCNC: 26 MMOL/L (ref 23–29)
COLOR UR AUTO: YELLOW
CREAT SERPL-MCNC: 2.5 MG/DL (ref 0.5–1.4)
DACRYOCYTES BLD QL SMEAR: ABNORMAL
DIFFERENTIAL METHOD: ABNORMAL
EOSINOPHIL # BLD AUTO: 0.1 K/UL (ref 0–0.5)
EOSINOPHIL NFR BLD: 2.3 % (ref 0–8)
ERYTHROCYTE [DISTWIDTH] IN BLOOD BY AUTOMATED COUNT: 26 % (ref 11.5–14.5)
EST. GFR  (NO RACE VARIABLE): 21.9 ML/MIN/1.73 M^2
GLUCOSE SERPL-MCNC: 83 MG/DL (ref 70–110)
GLUCOSE UR QL STRIP: ABNORMAL
HCT VFR BLD AUTO: 32.5 % (ref 37–48.5)
HCV AB SERPL QL IA: NORMAL
HGB BLD-MCNC: 9.7 G/DL (ref 12–16)
HGB UR QL STRIP: ABNORMAL
HYALINE CASTS UR QL AUTO: 0 /LPF
HYPOCHROMIA BLD QL SMEAR: ABNORMAL
IMM GRANULOCYTES # BLD AUTO: 0.02 K/UL (ref 0–0.04)
IMM GRANULOCYTES NFR BLD AUTO: 0.5 % (ref 0–0.5)
KETONES UR QL STRIP: NEGATIVE
LEUKOCYTE ESTERASE UR QL STRIP: ABNORMAL
LYMPHOCYTES # BLD AUTO: 0.9 K/UL (ref 1–4.8)
LYMPHOCYTES NFR BLD: 22.6 % (ref 18–48)
MCH RBC QN AUTO: 17.7 PG (ref 27–31)
MCHC RBC AUTO-ENTMCNC: 29.8 G/DL (ref 32–36)
MCV RBC AUTO: 59 FL (ref 82–98)
MICROSCOPIC COMMENT: ABNORMAL
MONOCYTES # BLD AUTO: 0.4 K/UL (ref 0.3–1)
MONOCYTES NFR BLD: 10.7 % (ref 4–15)
NEUTROPHILS # BLD AUTO: 2.5 K/UL (ref 1.8–7.7)
NEUTROPHILS NFR BLD: 63.4 % (ref 38–73)
NITRITE UR QL STRIP: NEGATIVE
NRBC BLD-RTO: 1 /100 WBC
PH UR STRIP: 6 [PH] (ref 5–8)
PLATELET # BLD AUTO: 181 K/UL (ref 150–450)
PLATELET BLD QL SMEAR: ABNORMAL
PMV BLD AUTO: ABNORMAL FL (ref 9.2–12.9)
POIKILOCYTOSIS BLD QL SMEAR: ABNORMAL
POTASSIUM SERPL-SCNC: 3.7 MMOL/L (ref 3.5–5.1)
PROT SERPL-MCNC: 7.3 G/DL (ref 6–8.4)
PROT UR QL STRIP: ABNORMAL
RBC # BLD AUTO: 5.47 M/UL (ref 4–5.4)
RBC #/AREA URNS AUTO: 3 /HPF (ref 0–4)
SCHISTOCYTES BLD QL SMEAR: ABNORMAL
SODIUM SERPL-SCNC: 144 MMOL/L (ref 136–145)
SP GR UR STRIP: 1.01 (ref 1–1.03)
SQUAMOUS #/AREA URNS AUTO: 17 /HPF
TARGETS BLD QL SMEAR: ABNORMAL
URN SPEC COLLECT METH UR: ABNORMAL
WBC # BLD AUTO: 3.94 K/UL (ref 3.9–12.7)
WBC #/AREA URNS AUTO: 22 /HPF (ref 0–5)
YEAST UR QL AUTO: ABNORMAL

## 2023-06-20 PROCEDURE — 99285 EMERGENCY DEPT VISIT HI MDM: CPT | Mod: ,,, | Performed by: EMERGENCY MEDICINE

## 2023-06-20 PROCEDURE — 96365 THER/PROPH/DIAG IV INF INIT: CPT

## 2023-06-20 PROCEDURE — 86803 HEPATITIS C AB TEST: CPT | Performed by: PHYSICIAN ASSISTANT

## 2023-06-20 PROCEDURE — 87086 URINE CULTURE/COLONY COUNT: CPT | Performed by: EMERGENCY MEDICINE

## 2023-06-20 PROCEDURE — 63600175 PHARM REV CODE 636 W HCPCS: Performed by: EMERGENCY MEDICINE

## 2023-06-20 PROCEDURE — 99285 EMERGENCY DEPT VISIT HI MDM: CPT | Mod: 25

## 2023-06-20 PROCEDURE — 85025 COMPLETE CBC W/AUTO DIFF WBC: CPT | Performed by: EMERGENCY MEDICINE

## 2023-06-20 PROCEDURE — 96361 HYDRATE IV INFUSION ADD-ON: CPT

## 2023-06-20 PROCEDURE — 81001 URINALYSIS AUTO W/SCOPE: CPT | Performed by: EMERGENCY MEDICINE

## 2023-06-20 PROCEDURE — 99285 PR EMERGENCY DEPT VISIT,LEVEL V: ICD-10-PCS | Mod: ,,, | Performed by: EMERGENCY MEDICINE

## 2023-06-20 PROCEDURE — 25000003 PHARM REV CODE 250: Performed by: EMERGENCY MEDICINE

## 2023-06-20 PROCEDURE — 80053 COMPREHEN METABOLIC PANEL: CPT | Performed by: EMERGENCY MEDICINE

## 2023-06-20 RX ADMIN — CEFTRIAXONE 1 G: 1 INJECTION, POWDER, FOR SOLUTION INTRAMUSCULAR; INTRAVENOUS at 11:06

## 2023-06-20 RX ADMIN — SODIUM CHLORIDE 250 ML: 9 INJECTION, SOLUTION INTRAVENOUS at 10:06

## 2023-06-20 NOTE — TELEPHONE ENCOUNTER
6/19/23  5:00 pm:   F/U on weekly Inotrope labs done today  CMP and CBC -results in epic  Noted Cr 2.7  from 2.1 and 1.9   na/K:  143/3.9  BUN:  48  T. Bili:  1.8 from 1.7   CBC stable for pts norms    Called and spoke w/ pt for assessment  Informed pt of elevated cr and reason this of concern  Pt reports weighs and takes bp and HR's daily   Does not write wts down , but states runs: 185-189   Denies swelling to legs/ankles, feet or abdomen  Denies sob or heavy breathing  Reports good u.o.     Some low bps with dizziness:  6/19:  77/54 -at me office this am: 118/80  6/18: pm 100/57   HR 89           Am:   115/77   79  6/17:  pm:  114/81  83  6/16:  pm:  71/52  95            Am:  101/65   82  6/15:  am:  98/68  112            Pm:  126/79   90  6/14:     am:   123/79    88    Reports taking:  Bumex 2 mg every am and 1 mg every pm  Jardiance 10 mg once every am  Entresto 49/51 one tab every am and pm     Since now after hours  Asked pt to not take her pm dose of Bumex tonight or am dose tomorrow   I will inform Dr. Cuellar of this assessment labs and holding bumex these 2 doses and see how he wants to proceed    6/20/23  1:20 pm:  Routing to Dr. Cuellar at this time.

## 2023-06-20 NOTE — TELEPHONE ENCOUNTER
----- Message from Natalia Marc MA sent at 6/20/2023  3:31 PM CDT -----  Doretha the patient   called to let you know she is on her way to the er. Thank you

## 2023-06-20 NOTE — TELEPHONE ENCOUNTER
2:20 pm:  see my previous NN and this is the cosign note I just received from Dr. Cuellar:    MD Yazmin Fam, RN  I recommend she goes to ER for evaluation.           Just placed call to pt. PJ  I left a detailed message w/ my name, with Dr. [facility] and that he has reviewed her chart and lab results from yesterday and would like for her to go to the emergency room today due to concern with significantly worsening kidney function. I stated she can go to local emergency room in [place] or come to main [place], but needs to do so today  Asked she return my call @ [phone]    Will follow.

## 2023-06-20 NOTE — TELEPHONE ENCOUNTER
3:35 pm:  Reviewed message below from pt       ----- Message from Natalia Marc MA sent at 6/20/2023  3:31 PM CDT -----  Doretha the patient   called to let you know she is on her way to the er. Thank you

## 2023-06-21 VITALS
RESPIRATION RATE: 18 BRPM | WEIGHT: 187.38 LBS | HEART RATE: 75 BPM | SYSTOLIC BLOOD PRESSURE: 126 MMHG | TEMPERATURE: 98 F | HEIGHT: 66 IN | DIASTOLIC BLOOD PRESSURE: 84 MMHG | BODY MASS INDEX: 30.11 KG/M2 | OXYGEN SATURATION: 93 %

## 2023-06-21 PROBLEM — F06.31 DEPRESSION DUE TO PHYSICAL ILLNESS: Chronic | Status: ACTIVE | Noted: 2018-05-08

## 2023-06-21 PROBLEM — N18.9 ACUTE KIDNEY INJURY SUPERIMPOSED ON CHRONIC KIDNEY DISEASE: Status: ACTIVE | Noted: 2021-10-28

## 2023-06-21 PROBLEM — J96.11 CHRONIC RESPIRATORY FAILURE WITH HYPOXIA AND HYPERCAPNIA: Chronic | Status: ACTIVE | Noted: 2021-03-16

## 2023-06-21 PROBLEM — N18.4 CKD (CHRONIC KIDNEY DISEASE), STAGE IV: Chronic | Status: ACTIVE | Noted: 2022-08-23

## 2023-06-21 PROBLEM — E11.22 TYPE 2 DIABETES MELLITUS WITH STAGE 4 CHRONIC KIDNEY DISEASE, WITH LONG-TERM CURRENT USE OF INSULIN: Chronic | Status: ACTIVE | Noted: 2020-08-13

## 2023-06-21 PROBLEM — N18.4 TYPE 2 DIABETES MELLITUS WITH STAGE 4 CHRONIC KIDNEY DISEASE, WITH LONG-TERM CURRENT USE OF INSULIN: Chronic | Status: ACTIVE | Noted: 2020-08-13

## 2023-06-21 PROBLEM — J96.12 CHRONIC RESPIRATORY FAILURE WITH HYPOXIA AND HYPERCAPNIA: Chronic | Status: ACTIVE | Noted: 2021-03-16

## 2023-06-21 PROBLEM — Z79.4 TYPE 2 DIABETES MELLITUS WITH STAGE 4 CHRONIC KIDNEY DISEASE, WITH LONG-TERM CURRENT USE OF INSULIN: Chronic | Status: ACTIVE | Noted: 2020-08-13

## 2023-06-21 LAB
ALBUMIN SERPL BCP-MCNC: 3.5 G/DL (ref 3.5–5.2)
ALP SERPL-CCNC: 52 U/L (ref 55–135)
ALT SERPL W/O P-5'-P-CCNC: 12 U/L (ref 10–44)
ANION GAP SERPL CALC-SCNC: 10 MMOL/L (ref 8–16)
AST SERPL-CCNC: 27 U/L (ref 10–40)
BACTERIA UR CULT: NORMAL
BACTERIA UR CULT: NORMAL
BASOPHILS # BLD AUTO: 0.01 K/UL (ref 0–0.2)
BASOPHILS NFR BLD: 0.3 % (ref 0–1.9)
BILIRUB SERPL-MCNC: 1.3 MG/DL (ref 0.1–1)
BUN SERPL-MCNC: 39 MG/DL (ref 6–20)
CALCIUM SERPL-MCNC: 9.3 MG/DL (ref 8.7–10.5)
CHLORIDE SERPL-SCNC: 106 MMOL/L (ref 95–110)
CO2 SERPL-SCNC: 26 MMOL/L (ref 23–29)
CREAT SERPL-MCNC: 2.4 MG/DL (ref 0.5–1.4)
CREAT UR-MCNC: 128 MG/DL (ref 15–325)
CREAT UR-MCNC: 128 MG/DL (ref 15–325)
DIFFERENTIAL METHOD: ABNORMAL
EOSINOPHIL # BLD AUTO: 0.1 K/UL (ref 0–0.5)
EOSINOPHIL NFR BLD: 3.2 % (ref 0–8)
ERYTHROCYTE [DISTWIDTH] IN BLOOD BY AUTOMATED COUNT: 25.6 % (ref 11.5–14.5)
EST. GFR  (NO RACE VARIABLE): 23 ML/MIN/1.73 M^2
GLUCOSE SERPL-MCNC: 113 MG/DL (ref 70–110)
HCT VFR BLD AUTO: 27.3 % (ref 37–48.5)
HGB BLD-MCNC: 8.2 G/DL (ref 12–16)
IMM GRANULOCYTES # BLD AUTO: 0.01 K/UL (ref 0–0.04)
IMM GRANULOCYTES NFR BLD AUTO: 0.3 % (ref 0–0.5)
LYMPHOCYTES # BLD AUTO: 0.9 K/UL (ref 1–4.8)
LYMPHOCYTES NFR BLD: 26.9 % (ref 18–48)
MAGNESIUM SERPL-MCNC: 1.7 MG/DL (ref 1.6–2.6)
MCH RBC QN AUTO: 17.7 PG (ref 27–31)
MCHC RBC AUTO-ENTMCNC: 30 G/DL (ref 32–36)
MCV RBC AUTO: 59 FL (ref 82–98)
MONOCYTES # BLD AUTO: 0.4 K/UL (ref 0.3–1)
MONOCYTES NFR BLD: 10.6 % (ref 4–15)
NEUTROPHILS # BLD AUTO: 2.1 K/UL (ref 1.8–7.7)
NEUTROPHILS NFR BLD: 58.7 % (ref 38–73)
NRBC BLD-RTO: 1 /100 WBC
PHOSPHATE SERPL-MCNC: 3.7 MG/DL (ref 2.7–4.5)
PLATELET # BLD AUTO: 142 K/UL (ref 150–450)
PMV BLD AUTO: ABNORMAL FL (ref 9.2–12.9)
POCT GLUCOSE: 108 MG/DL (ref 70–110)
POCT GLUCOSE: 96 MG/DL (ref 70–110)
POTASSIUM SERPL-SCNC: 3.8 MMOL/L (ref 3.5–5.1)
PROT SERPL-MCNC: 6.5 G/DL (ref 6–8.4)
PROT UR-MCNC: 94 MG/DL (ref 0–15)
PROT/CREAT UR: 0.73 MG/G{CREAT} (ref 0–0.2)
RBC # BLD AUTO: 4.63 M/UL (ref 4–5.4)
SARS-COV-2 RDRP RESP QL NAA+PROBE: NEGATIVE
SODIUM SERPL-SCNC: 142 MMOL/L (ref 136–145)
SODIUM UR-SCNC: 24 MMOL/L (ref 20–250)
WBC # BLD AUTO: 3.49 K/UL (ref 3.9–12.7)

## 2023-06-21 PROCEDURE — 96375 TX/PRO/DX INJ NEW DRUG ADDON: CPT

## 2023-06-21 PROCEDURE — 83735 ASSAY OF MAGNESIUM: CPT | Performed by: INTERNAL MEDICINE

## 2023-06-21 PROCEDURE — 84100 ASSAY OF PHOSPHORUS: CPT | Performed by: INTERNAL MEDICINE

## 2023-06-21 PROCEDURE — 80053 COMPREHEN METABOLIC PANEL: CPT | Performed by: INTERNAL MEDICINE

## 2023-06-21 PROCEDURE — 99223 PR INITIAL HOSPITAL CARE,LEVL III: ICD-10-PCS | Mod: ,,, | Performed by: INTERNAL MEDICINE

## 2023-06-21 PROCEDURE — 93010 ELECTROCARDIOGRAM REPORT: CPT | Mod: ,,, | Performed by: INTERNAL MEDICINE

## 2023-06-21 PROCEDURE — 99214 PR OFFICE/OUTPT VISIT, EST, LEVL IV, 30-39 MIN: ICD-10-PCS | Mod: ,,, | Performed by: INTERNAL MEDICINE

## 2023-06-21 PROCEDURE — 63600175 PHARM REV CODE 636 W HCPCS: Performed by: INTERNAL MEDICINE

## 2023-06-21 PROCEDURE — 99499 UNLISTED E&M SERVICE: CPT | Mod: ,,, | Performed by: HOSPITALIST

## 2023-06-21 PROCEDURE — 93010 EKG 12-LEAD: ICD-10-PCS | Mod: ,,, | Performed by: INTERNAL MEDICINE

## 2023-06-21 PROCEDURE — 96374 THER/PROPH/DIAG INJ IV PUSH: CPT | Mod: 59

## 2023-06-21 PROCEDURE — 93005 ELECTROCARDIOGRAM TRACING: CPT

## 2023-06-21 PROCEDURE — 85025 COMPLETE CBC W/AUTO DIFF WBC: CPT | Performed by: INTERNAL MEDICINE

## 2023-06-21 PROCEDURE — 84300 ASSAY OF URINE SODIUM: CPT | Performed by: INTERNAL MEDICINE

## 2023-06-21 PROCEDURE — 99223 1ST HOSP IP/OBS HIGH 75: CPT | Mod: ,,, | Performed by: INTERNAL MEDICINE

## 2023-06-21 PROCEDURE — G0378 HOSPITAL OBSERVATION PER HR: HCPCS

## 2023-06-21 PROCEDURE — 99499 NO LOS: ICD-10-PCS | Mod: ,,, | Performed by: HOSPITALIST

## 2023-06-21 PROCEDURE — 84156 ASSAY OF PROTEIN URINE: CPT | Performed by: INTERNAL MEDICINE

## 2023-06-21 PROCEDURE — 25000003 PHARM REV CODE 250

## 2023-06-21 PROCEDURE — 99214 OFFICE O/P EST MOD 30 MIN: CPT | Mod: ,,, | Performed by: INTERNAL MEDICINE

## 2023-06-21 PROCEDURE — U0002 COVID-19 LAB TEST NON-CDC: HCPCS | Performed by: HOSPITALIST

## 2023-06-21 PROCEDURE — 25000003 PHARM REV CODE 250: Performed by: INTERNAL MEDICINE

## 2023-06-21 RX ORDER — SACUBITRIL AND VALSARTAN 24; 26 MG/1; MG/1
1 TABLET, FILM COATED ORAL 2 TIMES DAILY
Qty: 90 TABLET | Refills: 7 | Status: ON HOLD | OUTPATIENT
Start: 2023-06-21 | End: 2023-06-30 | Stop reason: HOSPADM

## 2023-06-21 RX ORDER — ATORVASTATIN CALCIUM 20 MG/1
40 TABLET, FILM COATED ORAL NIGHTLY
Status: DISCONTINUED | OUTPATIENT
Start: 2023-06-21 | End: 2023-06-21 | Stop reason: HOSPADM

## 2023-06-21 RX ORDER — DOBUTAMINE HYDROCHLORIDE 400 MG/100ML
2.5 INJECTION INTRAVENOUS CONTINUOUS
Status: DISCONTINUED | OUTPATIENT
Start: 2023-06-21 | End: 2023-06-21 | Stop reason: HOSPADM

## 2023-06-21 RX ORDER — POTASSIUM CHLORIDE 20 MEQ/1
20 TABLET, EXTENDED RELEASE ORAL ONCE
Status: COMPLETED | OUTPATIENT
Start: 2023-06-21 | End: 2023-06-21

## 2023-06-21 RX ORDER — AMIODARONE HYDROCHLORIDE 200 MG/1
200 TABLET ORAL DAILY
Status: DISCONTINUED | OUTPATIENT
Start: 2023-06-21 | End: 2023-06-21 | Stop reason: HOSPADM

## 2023-06-21 RX ORDER — ROPINIROLE 2 MG/1
4 TABLET, FILM COATED ORAL DAILY
Status: DISCONTINUED | OUTPATIENT
Start: 2023-06-21 | End: 2023-06-21 | Stop reason: HOSPADM

## 2023-06-21 RX ORDER — IPRATROPIUM BROMIDE AND ALBUTEROL SULFATE 2.5; .5 MG/3ML; MG/3ML
3 SOLUTION RESPIRATORY (INHALATION) EVERY 6 HOURS PRN
Status: DISCONTINUED | OUTPATIENT
Start: 2023-06-21 | End: 2023-06-21 | Stop reason: HOSPADM

## 2023-06-21 RX ORDER — PREGABALIN 50 MG/1
50 CAPSULE ORAL 2 TIMES DAILY
Status: DISCONTINUED | OUTPATIENT
Start: 2023-06-21 | End: 2023-06-21 | Stop reason: HOSPADM

## 2023-06-21 RX ORDER — NALOXONE HCL 0.4 MG/ML
0.02 VIAL (ML) INJECTION
Status: DISCONTINUED | OUTPATIENT
Start: 2023-06-21 | End: 2023-06-21 | Stop reason: HOSPADM

## 2023-06-21 RX ORDER — PROMETHAZINE HYDROCHLORIDE 12.5 MG/1
12.5 TABLET ORAL ONCE
Status: COMPLETED | OUTPATIENT
Start: 2023-06-21 | End: 2023-06-21

## 2023-06-21 RX ORDER — ESCITALOPRAM OXALATE 10 MG/1
10 TABLET ORAL DAILY
Status: DISCONTINUED | OUTPATIENT
Start: 2023-06-21 | End: 2023-06-21 | Stop reason: HOSPADM

## 2023-06-21 RX ORDER — INSULIN ASPART 100 [IU]/ML
0-5 INJECTION, SOLUTION INTRAVENOUS; SUBCUTANEOUS
Status: DISCONTINUED | OUTPATIENT
Start: 2023-06-21 | End: 2023-06-21 | Stop reason: HOSPADM

## 2023-06-21 RX ORDER — MAGNESIUM SULFATE HEPTAHYDRATE 40 MG/ML
2 INJECTION, SOLUTION INTRAVENOUS ONCE
Status: COMPLETED | OUTPATIENT
Start: 2023-06-21 | End: 2023-06-21

## 2023-06-21 RX ORDER — TALC
6 POWDER (GRAM) TOPICAL NIGHTLY PRN
Status: DISCONTINUED | OUTPATIENT
Start: 2023-06-21 | End: 2023-06-21 | Stop reason: HOSPADM

## 2023-06-21 RX ORDER — BUMETANIDE 2 MG/1
2 TABLET ORAL DAILY
Qty: 90 TABLET | Refills: 3 | Status: SHIPPED | OUTPATIENT
Start: 2023-06-21 | End: 2023-06-21 | Stop reason: SDUPTHER

## 2023-06-21 RX ORDER — DEXTROSE 40 %
30 GEL (GRAM) ORAL
Status: DISCONTINUED | OUTPATIENT
Start: 2023-06-21 | End: 2023-06-21 | Stop reason: HOSPADM

## 2023-06-21 RX ORDER — PANTOPRAZOLE SODIUM 40 MG/1
40 TABLET, DELAYED RELEASE ORAL DAILY
Status: DISCONTINUED | OUTPATIENT
Start: 2023-06-21 | End: 2023-06-21 | Stop reason: HOSPADM

## 2023-06-21 RX ORDER — BUMETANIDE 2 MG/1
2 TABLET ORAL DAILY
Qty: 90 TABLET | Refills: 3 | Status: ON HOLD | OUTPATIENT
Start: 2023-06-21 | End: 2023-06-30 | Stop reason: SDUPTHER

## 2023-06-21 RX ORDER — DEXTROSE 40 %
15 GEL (GRAM) ORAL
Status: DISCONTINUED | OUTPATIENT
Start: 2023-06-21 | End: 2023-06-21 | Stop reason: HOSPADM

## 2023-06-21 RX ORDER — GLUCAGON 1 MG
1 KIT INJECTION
Status: DISCONTINUED | OUTPATIENT
Start: 2023-06-21 | End: 2023-06-21 | Stop reason: HOSPADM

## 2023-06-21 RX ORDER — AMOXICILLIN 250 MG
1 CAPSULE ORAL 2 TIMES DAILY PRN
Status: DISCONTINUED | OUTPATIENT
Start: 2023-06-21 | End: 2023-06-21 | Stop reason: HOSPADM

## 2023-06-21 RX ORDER — DOBUTAMINE HYDROCHLORIDE 400 MG/100ML
2.5 INJECTION INTRAVENOUS CONTINUOUS
Status: DISCONTINUED | OUTPATIENT
Start: 2023-06-21 | End: 2023-06-21

## 2023-06-21 RX ORDER — SODIUM CHLORIDE 0.9 % (FLUSH) 0.9 %
10 SYRINGE (ML) INJECTION EVERY 12 HOURS PRN
Status: DISCONTINUED | OUTPATIENT
Start: 2023-06-21 | End: 2023-06-21 | Stop reason: HOSPADM

## 2023-06-21 RX ORDER — LANOLIN ALCOHOL/MO/W.PET/CERES
1 CREAM (GRAM) TOPICAL 2 TIMES DAILY
Status: DISCONTINUED | OUTPATIENT
Start: 2023-06-21 | End: 2023-06-21 | Stop reason: HOSPADM

## 2023-06-21 RX ORDER — POLYETHYLENE GLYCOL 3350 17 G/17G
17 POWDER, FOR SOLUTION ORAL 2 TIMES DAILY PRN
Status: DISCONTINUED | OUTPATIENT
Start: 2023-06-21 | End: 2023-06-21 | Stop reason: HOSPADM

## 2023-06-21 RX ADMIN — APIXABAN 5 MG: 5 TABLET, FILM COATED ORAL at 08:06

## 2023-06-21 RX ADMIN — PROMETHAZINE HYDROCHLORIDE 12.5 MG: 12.5 TABLET ORAL at 11:06

## 2023-06-21 RX ADMIN — ATORVASTATIN CALCIUM 40 MG: 40 TABLET, FILM COATED ORAL at 02:06

## 2023-06-21 RX ADMIN — ISOSORBIDE DINITRATE: 20 TABLET ORAL at 08:06

## 2023-06-21 RX ADMIN — ROPINIROLE 4 MG: 2 TABLET, FILM COATED ORAL at 08:06

## 2023-06-21 RX ADMIN — ESCITALOPRAM OXALATE 10 MG: 10 TABLET ORAL at 08:06

## 2023-06-21 RX ADMIN — PANTOPRAZOLE SODIUM 40 MG: 40 TABLET, DELAYED RELEASE ORAL at 08:06

## 2023-06-21 RX ADMIN — PREGABALIN 50 MG: 50 CAPSULE ORAL at 08:06

## 2023-06-21 RX ADMIN — ISOSORBIDE DINITRATE: 20 TABLET ORAL at 02:06

## 2023-06-21 RX ADMIN — AMIODARONE HYDROCHLORIDE 200 MG: 200 TABLET ORAL at 08:06

## 2023-06-21 RX ADMIN — DOBUTAMINE HYDROCHLORIDE 2.5 MCG/KG/MIN: 400 INJECTION INTRAVENOUS at 05:06

## 2023-06-21 RX ADMIN — FERROUS SULFATE TAB 325 MG (65 MG ELEMENTAL FE) 1 EACH: 325 (65 FE) TAB at 08:06

## 2023-06-21 RX ADMIN — POTASSIUM CHLORIDE 20 MEQ: 1500 TABLET, EXTENDED RELEASE ORAL at 05:06

## 2023-06-21 RX ADMIN — MAGNESIUM SULFATE 2 G: 2 INJECTION INTRAVENOUS at 05:06

## 2023-06-21 NOTE — ED TRIAGE NOTES
"Hafsa Hawley, a 57 y.o. female presents to the ED w/ complaint of abnormal lab. On dobutamine drip, was told to come in for elevated kidney function    Adult Physical Assessment  LOC: Hafsa Hawley, 57 y.o. female verified via two identifiers.  The patient is awake, alert, oriented and speaking appropriately at this time.  APPEARANCE: Patient resting comfortably and appears to be in no acute distress at this time. Patient is clean and well groomed, patient's clothing is properly fastened.  SKIN:The skin is warm and dry, color consistent with ethnicity, patient has normal skin turgor and moist mucus membranes, skin intact, no breakdown or brusing noted.  MUSCULOSKELETAL: Patient moving all extremities well, no obvious swelling or deformities noted.  RESPIRATORY: Airway is open and patent, respirations are spontaneous, patient has a normal effort and rate, no accessory muscle use noted.  CARDIAC: Patient has a normal rate and rhythm, no periphreal edema noted in any extremity, capillary refill < 3 seconds in all extremities  ABDOMEN: Soft and non tender to palpation, no abdominal distention noted. Bowel sounds present in all four quadrants.  NEUROLOGIC: Eyes open spontaneously, behavior appropriate to situation, follows commands, facial expression symmetrical, bilateral hand grasp equal and even, purposeful motor response noted, normal sensation in all extremities when touched with a finger.      Triage note:  Chief Complaint   Patient presents with    Abnormal Lab     On dobutamine pump, got call from nurse elevated kidney function     Review of patient's allergies indicates:   Allergen Reactions    Penicillins Hives and Other (See Comments)    Iodinated contrast media Nausea And Vomiting    Oxycodone-acetaminophen Other (See Comments)     Nausea, Dizziness, Anxiety.  "I don't like how it makes me feel."   Given Hydromorphone 0.5mg IVP  Without problems.  Other reaction(s): Other (See Comments)    Clonazepam " Other (See Comments)    Diovan hct [valsartan-hydrochlorothiazide] Other (See Comments)     Causes coughing    Iodine Other (See Comments)    Irinotecan      Pt has homozygosity for the TA7 promoter variant that places this individual at significantly increased risk for   severe neutropenia (grade 4) when treated with the standard dose of irinotecan (risk approximately 50%).   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 Metabolism of other drugs not listed here may also be impacted by UGT1A1 enzyme activity.       Tramadol Nausea And Vomiting and Other (See Comments)     Other reaction(s): Other (See Comments)    Valsartan Other (See Comments)     Past Medical History:   Diagnosis Date    Anemia     Arthritis     Atrial fibrillation     OAC    Chronic respiratory failure with hypoxia, on home oxygen therapy     2L with activity, off at rest.  Per Pulm  no overt evidence of ILD or COPD on PFTs and CT to explain O2 needs.    CKD (chronic kidney disease), stage IV 05/08/2018    Congestive heart failure     s/p AICD placement,    Deep vein thrombosis     Depression     elevated bilirubin d/t Gilbert's syndrome     confirmed by [place] genetic testing, evaluated by hepatology    Encounter for blood transfusion     GERD (gastroesophageal reflux disease)     Hypertension     Pheochromocytoma, malignant     Right kidney mass     Sleep apnea     Thalassemia trait, alpha     Thyroid disease     Type 2 diabetes mellitus with hyperglycemia, without long-term current use of insulin 08/13/2020

## 2023-06-21 NOTE — HOSPITAL COURSE
Patient admitted to  for WASHINGTON (cr 2.7) noted on lab work in cardiology clinic. On admit, patient's Cr 2.5 on admit after holding bumex outpatient. Patient observed overnight with continued improvement in renal function while holding medication. Cardiology consulted to assess patient with following recommendations:    - Resume GDMT Entresto 24/26mg BID, Jardiance 10mg daily, Bidil 35.7-20mg  - Continue palliative dobutamine ggt  - Decreasing Bumex to 2mg once daily  - Consider starting spironolactone in outpatient setting  - Follow up with cardiology (Dr. Benson Cortez)     Medications changes made and referral placed via . Will need close follow up for repeat BMP to monitor renal function. Patient verbalizes understanding, given precautions on when to return to ED.      Vital Signs (Most Recent):  Temp: 98.4 °F (36.9 °C) (06/20/23 2027)  Pulse: 73 (06/20/23 2027)  Resp: 16 (06/20/23 2027)  BP: 122/72 (06/20/23 2027)  SpO2: 96 % (06/20/23 2027) Vital Signs (24h Range):  Temp:  [98.2 °F (36.8 °C)-98.4 °F (36.9 °C)] 98.4 °F (36.9 °C)  Pulse:  [73-88] 73  Resp:  [16-18] 16  SpO2:  [96 %] 96 %  BP: (122-123)/(69-72) 122/72     Weight: 83.5 kg (184 lb)  Body mass index is 29.7 kg/m².     Physical Exam  Vitals and nursing note reviewed.   Constitutional:       General: She is not in acute distress.     Appearance: She is not ill-appearing, toxic-appearing or diaphoretic.   HENT:      Head: Normocephalic and atraumatic.      Right Ear: External ear normal.      Left Ear: External ear normal.      Mouth/Throat:      Mouth: Mucous membranes are moist.      Pharynx: No oropharyngeal exudate.   Eyes:      Extraocular Movements: Extraocular movements intact.      Pupils: Pupils are equal, round, and reactive to light.   Cardiovascular:      Rate and Rhythm: Normal rate and regular rhythm.      Pulses: Normal pulses.      Heart sounds: Normal heart sounds. No murmur heard.  Pulmonary:      Effort: Pulmonary effort is  normal. No respiratory distress.      Breath sounds: Normal breath sounds. No wheezing or rales.   Abdominal:      General: Abdomen is flat. Bowel sounds are normal. There is no distension.      Palpations: Abdomen is soft. There is no mass.   Musculoskeletal:         General: No swelling or tenderness. Normal range of motion.      Cervical back: Normal range of motion and neck supple.      Right lower leg: No edema.      Left lower leg: No edema.   Skin:     General: Skin is warm and dry.      Capillary Refill: Capillary refill takes less than 2 seconds.      Comments: Port in RUE   Neurological:      General: No focal deficit present.      Mental Status: She is alert and oriented to person, place, and time. Mental status is at baseline.   Psychiatric:         Mood and Affect: Mood normal.         Behavior: Behavior normal.

## 2023-06-21 NOTE — H&P
[facility] - Emergency Dept  [facility] Medicine  History & Physical    Patient Name: Hafsa Hawley  MRN: [id number]  Patient Class: OP- Observation   Admission Date: 6/20/2023  Attending Physician: Krysta Hansen MD   Primary Care Provider: Cristobal Ann MD        Patient information was obtained from patient, past medical records, and ER records.     Subjective:     Principal Problem:Acute kidney injury superimposed on chronic kidney disease    Chief Complaint:  Chief Complaint   Patient presents with    Abnormal Lab     On dobutamine pump, got call from nurse elevated kidney function       HPI: 57 y.o. female w/ h/o CKD 4, HFrEF (15%) on dobutamine & s/p AICD, AF on amiodarone & apixaban, chronic resp failure previously on home O2, h/o PCC, DM2, HTN, HLD, MDD; p/w abnormal renal function labs drawn on 06/19 in Cards f/u clinic. Reports some abdo & flank pain x1 wk, in s/o chronic nausea/vomiting which she reports is her b/l.  Reports that dobutamine pump has been working for her.  Outpt Cards held her last 2 doses of bumetanide in s/o WASHINGTON, & instructed pt to present to ED.  Denies fevers/chills, CP/SOB/cough, abdo pain/nausea/vomiting/constipation/diarrhea, dysuria/hematuria, melena/hematochezia, weakness/numbness/tingling, HA/presyncope/syncope.     ED course: AF, HR 88, /69, 96% ORA.  Labs u/r except for Hb 9.7 (b/l 8-10), Cr 2.5 (was 2.7 on 06/19; b/l 1.7-2.0). UA w/ 2+ protein, 4+ glucose, 1+ blood, trace leukocytes, negative nitrite, 22 WBC, few bacteria. CTAP w/o evidence of acute inflammatory or obstructive intra-abdominal or intrapelvic process; noted stable small pericardial effusion. Given IVF & CTX. Cards consulted; not currently heart transplant candidate.      Past Medical History:   Diagnosis Date    Anemia     Arthritis     Atrial fibrillation     OAC    Chronic respiratory failure with hypoxia, on home oxygen therapy     2L with activity, off at rest.  Per Pulm  no overt evidence of ILD or  COPD on PFTs and CT to explain O2 needs.    CKD (chronic kidney disease), stage IV 05/08/2018    Congestive heart failure     s/p AICD placement,    Deep vein thrombosis     Depression     elevated bilirubin d/t Gilbert's syndrome     confirmed by [place] genetic testing, evaluated by hepatology    Encounter for blood transfusion     GERD (gastroesophageal reflux disease)     Hypertension     Pheochromocytoma, malignant     Right kidney mass     Sleep apnea     Thalassemia trait, alpha     Thyroid disease     Type 2 diabetes mellitus with hyperglycemia, without long-term current use of insulin 08/13/2020       Past Surgical History:   Procedure Laterality Date    APPENDECTOMY      BONE MARROW BIOPSY      CARDIAC DEFIBRILLATOR PLACEMENT Left 12/2016    CARDIAC ELECTROPHYSIOLOGY MAPPING AND ABLATION      CARDIAC ELECTROPHYSIOLOGY MAPPING AND ABLATION      COLONOSCOPY N/A 5/6/2022    Procedure: COLONOSCOPY;  Surgeon: Arely Betancourt MD;  Location: [facility] ENDO ([address] [place] [address]);  Service: Endoscopy;  Laterality: N/A;  heart transplant candidate/ EF 25% - 2nd floor/ defib - Biotronik - ERW  Eliquis - per Dr. Cortez with [facility] [place], Pt ok to hold Eliquis x 2 days prior-see media tab-outside correspondence dated 12/30/21  - ERW  verbal instructions/portal instructions/email instructions - s    EYE SURGERY      due to running tears    FRACTURE SURGERY Left     hand 5th digit    HYSTERECTOMY      KNEE SURGERY Left 2016    hematoma    LIVER BIOPSY  10/24/2018    Minimal steatosis, predominantly macrovesicular, 1%, Minimal nonspecific portal inflammation, no fibrosis. No findings on biopsy to explain elevated bilirubin levels. Could be d/t Gilbert's =?- hemolysis    RIGHT HEART CATHETERIZATION Right 12/07/2021    Procedure: INSERTION, CATHETER, RIGHT HEART;  Surgeon: Irving Cardenas MD;  Location: [facility] CATH LAB;  Service: Cardiology;  Laterality: Right;    RIGHT HEART CATHETERIZATION Right 12/19/2022    Procedure: INSERTION, CATHETER,  "RIGHT HEART;  Surgeon: Burke Camilo MD;  Location: [facility] CATH LAB;  Service: Cardiology;  Laterality: Right;    RIGHT HEART CATHETERIZATION Right 3/29/2023    Procedure: INSERTION, CATHETER, RIGHT HEART;  Surgeon: Katie Liriano DO;  Location: [facility] CATH LAB;  Service: Cardiology;  Laterality: Right;    TRANSJUGULAR BIOPSY OF LIVER N/A 10/24/2018    Procedure: BIOPSY, LIVER, TRANSJUGULAR APPROACH;  Surgeon: [facility] Diagnostic Provider;  Location: [facility] [place] [address] [place] [address];  Service: Radiology;  Laterality: N/A;       Review of patient's allergies indicates:   Allergen Reactions    Penicillins Hives and Other (See Comments)    Iodinated contrast media Nausea And Vomiting    Oxycodone-acetaminophen Other (See Comments)     Nausea, Dizziness, Anxiety.  "I don't like how it makes me feel."   Given Hydromorphone 0.5mg IVP  Without problems.  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 Metabolism of other drugs not listed here may also be impacted by UGT1A1 enzyme activity.       Tramadol Nausea And Vomiting and Other (See Comments)     Other reaction(s): Other (See Comments)    Valsartan Other (See Comments)       Current Facility-Administered Medications on File Prior to Encounter   Medication    0.9%  NaCl infusion    vancomycin in dextrose 5 % 1 gram/250 mL IVPB 1,000 mg     Current Outpatient Medications on File Prior to Encounter   Medication Sig    albuterol-ipratropium " (DUO-NEB) 2.5 mg-0.5 mg/3 mL nebulizer solution Take 3 mLs by nebulization 2 (two) times a day.    allopurinoL (ZYLOPRIM) 100 MG tablet Take 1 tablet (100 mg total) by mouth daily as needed (gout attack).    ALPRAZolam (XANAX) 2 MG Tab ALPRAZolam 2 mg tablet, [RxNorm: 939487]    amiodarone (PACERONE) 200 MG Tab Take 200 mg by mouth.    atorvastatin (LIPITOR) 40 MG tablet Take 1 tablet (40 mg total) by mouth every evening.    b complex vitamins tablet Take 1 tablet by mouth once daily.    blood sugar diagnostic (ACCU-CHEK GUIDE TEST STRIPS) Strp 1 strip by Other route 3 (three) times daily.    bumetanide (BUMEX) 2 MG tablet Take 2mg in the morning and 1mg in the evening    cetirizine (ZYRTEC) 10 MG tablet Take 10 mg by mouth daily as needed for Allergies.    diclofenac sodium (VOLTAREN) 1 % Gel APPLY 2 GRAMS TOPICALLY 3 (THREE) TIMES DAILY AS NEEDED (PAIN).    dobutamine HCl (DOBUTAMINE IV) Inject into the vein.    ELIQUIS 5 mg Tab TAKE 1 TABLET (5 MG TOTAL) BY MOUTH 2 (TWO) TIMES DAILY.    empagliflozin (JARDIANCE) 10 mg tablet Take 1 tablet (10 mg total) by mouth once daily.    ergocalciferol (ERGOCALCIFEROL) 50,000 unit Cap Take 1 capsule (50,000 Units total) by mouth every Saturday.    EScitalopram oxalate (LEXAPRO) 10 MG tablet Take 1 tablet (10 mg total) by mouth once daily.    ferrous sulfate (FEOSOL) 325 mg (65 mg iron) Tab tablet TAKE 1 TABLET ORALLY 2 TIMES A DAY AFTER FOOD.    fluticasone propionate (FLONASE) 50 mcg/actuation nasal spray 2 sprays by Each Nostril route daily as needed for Rhinitis.     HYDROcodone-acetaminophen (NORCO) 7.5-325 mg per tablet Take 1 tablet by mouth every 8 (eight) hours as needed for Pain.    isosorbide-hydrALAZINE 20-37.5 mg (BIDIL) 20-37.5 mg Tab Take 1 tablet by mouth 3 (three) times daily.    lancets (ACCU-CHEK SOFTCLIX LANCETS) Misc 1 Device by Misc.(Non-Drug; Combo Route) route 3 (three) times daily.    LIDOcaine (LIDODERM) 5 % Place 1 patch onto the skin daily as  needed (pain). Remove & Discard patch within 12 hours or as directed by MD (Patient not taking: Reported on 6/19/2023)    LIDOcaine-prilocaine 5-2.5-2.5 % PaCr Apply 1 patch topically once daily. (Patient not taking: Reported on 6/19/2023)    mometasone-formoterol (DULERA) 200-5 mcg/actuation inhaler Inhale 2 puffs into the lungs 2 (two) times daily. Controller    nitroGLYCERIN (NITROSTAT) 0.4 MG SL tablet Nitrostat 0.4 mg sublingual tablet, [RxNorm: 470316]    ondansetron (ZOFRAN) 4 MG tablet ondansetron HCL 4 mg tablet, [RxNorm: 902990]    ondansetron (ZOFRAN-ODT) 8 MG TbDL Take 1 tablet (8 mg total) by mouth every 12 (twelve) hours as needed (nausea).    pantoprazole (PROTONIX) 40 MG tablet TAKE 1 TABLET BY MOUTH DAILY IN THE MORNING    pregabalin (LYRICA) 50 MG capsule Take 1 capsule (50 mg total) by mouth 2 (two) times daily.    promethazine-codeine 6.25-10 mg/5 ml (PHENERGAN WITH CODEINE) 6.25-10 mg/5 mL syrup promethazine 6.25 mg-codeine 10 mg/5 mL syrup, [RxNorm: 487463]    promethazine-codeine 6.25-10 mg/5 ml (PHENERGAN WITH CODEINE) 6.25-10 mg/5 mL syrup TAKE 5 ML BY MOUTH EVERY 4-6 HOURS    rOPINIRole (REQUIP) 4 MG tablet TAKE 1 TABLET (4 MG TOTAL) BY MOUTH ONCE DAILY. PT TAKING 4MG DAILY    sacubitriL-valsartan (ENTRESTO) 49-51 mg per tablet Take 1 tablet by mouth 2 (two) times daily.    semaglutide 0.25 mg or 0.5 mg (2 mg/3 mL) PnIj Inject 0.5 mg into the skin every 7 days.    SPIRIVA WITH HANDIHALER 18 mcg inhalation capsule Inhale 1 capsule (18 mcg total) into the lungs once daily.    traZODone (DESYREL) 50 MG tablet Take 25 mg by mouth nightly as needed.    VENTOLIN HFA 90 mcg/actuation inhaler Inhale 2 puffs into the lungs every 6 (six) hours as needed for Shortness of Breath or Wheezing.     Family History       Problem Relation (Age of Onset)    Cancer Mother    Cirrhosis Brother    Diabetes Brother    Heart attack Father    Heart disease Father, Sister, Brother, Sister, Brother    Hypertension  Father, Brother          Tobacco Use    Smoking status: Never    Smokeless tobacco: Never   Substance and Sexual Activity    Alcohol use: Yes     Comment: up to 1 yr ago drank 2-3 drinks on occasion but sporadic    Drug use: No    Sexual activity: Yes     Partners: Male     Review of Systems   Constitutional:  Negative for chills, fever and unexpected weight change.   HENT:  Negative for sinus pain, trouble swallowing and voice change.    Eyes:  Negative for photophobia, pain and visual disturbance.   Respiratory:  Negative for cough, shortness of breath and wheezing.    Cardiovascular:  Negative for chest pain, palpitations and leg swelling.   Gastrointestinal:  Positive for abdominal pain, nausea and vomiting. Negative for blood in stool, constipation and diarrhea.   Endocrine: Negative for polydipsia and polyuria.   Genitourinary:  Positive for flank pain. Negative for dysuria, frequency, hematuria and urgency.   Musculoskeletal:  Positive for myalgias. Negative for arthralgias and back pain.   Skin:  Negative for color change and pallor.   Neurological:  Negative for syncope, weakness, numbness and headaches.   Hematological:  Does not bruise/bleed easily.   Psychiatric/Behavioral:  Negative for confusion, dysphoric mood and sleep disturbance.    Objective:     Vital Signs (Most Recent):  Temp: 98.4 °F (36.9 °C) (06/20/23 2027)  Pulse: 73 (06/20/23 2027)  Resp: 16 (06/20/23 2027)  BP: 122/72 (06/20/23 2027)  SpO2: 96 % (06/20/23 2027) Vital Signs (24h Range):  Temp:  [98.2 °F (36.8 °C)-98.4 °F (36.9 °C)] 98.4 °F (36.9 °C)  Pulse:  [73-88] 73  Resp:  [16-18] 16  SpO2:  [96 %] 96 %  BP: (122-123)/(69-72) 122/72     Weight: 83.5 kg (184 lb)  Body mass index is 29.7 kg/m².     Physical Exam  Vitals and nursing note reviewed.   Constitutional:       General: She is not in acute distress.     Appearance: She is not ill-appearing, toxic-appearing or diaphoretic.   HENT:      Head: Normocephalic and atraumatic.       Right Ear: External ear normal.      Left Ear: External ear normal.      Mouth/Throat:      Mouth: Mucous membranes are moist.      Pharynx: No oropharyngeal exudate.   Eyes:      Extraocular Movements: Extraocular movements intact.      Pupils: Pupils are equal, round, and reactive to light.   Cardiovascular:      Rate and Rhythm: Normal rate and regular rhythm.      Pulses: Normal pulses.      Heart sounds: Normal heart sounds. No murmur heard.  Pulmonary:      Effort: Pulmonary effort is normal. No respiratory distress.      Breath sounds: Normal breath sounds. No wheezing or rales.   Abdominal:      General: Abdomen is flat. Bowel sounds are normal. There is no distension.      Palpations: Abdomen is soft. There is no mass.      Tenderness: There is abdominal tenderness.   Musculoskeletal:         General: No swelling or tenderness. Normal range of motion.      Cervical back: Normal range of motion and neck supple.      Right lower leg: No edema.      Left lower leg: No edema.   Skin:     General: Skin is warm and dry.      Capillary Refill: Capillary refill takes less than 2 seconds.      Comments: Port in RUE   Neurological:      General: No focal deficit present.      Mental Status: She is alert and oriented to person, place, and time. Mental status is at baseline.   Psychiatric:         Mood and Affect: Mood normal.         Behavior: Behavior normal.            CRANIAL NERVES     CN III, IV, VI   Pupils are equal, round, and reactive to light.     Significant Labs: All pertinent labs within the past 24 hours have been reviewed.    Significant Imaging: I have reviewed and interpreted all pertinent imaging results/findings within the past 24 hours.  Assessment/Plan:      * Acute kidney injury superimposed on chronic kidney disease  CKD (chronic kidney disease), stage IV    Pt w/ CKD 4 & severely reduced EF (15%) w/ b/l Cr 1.7-2.0, found to have elevated Cr 2.7 on labs on outpt 06/19 Cards f/u. Symptoms include  abdo pain, flank pain, nausea/vomiting. Outpt Cards instructed pt to hold home diuretic; pt has held x2 doses by the time she arrived to ED. On ED arrival, Cr improved to 2.5. Pt not clinically hypervolemic. Imaging w/o signs of inflammatory or infectious intra-abdominal/intrapelvic process. Given clinical euvolemia & Cr improvement after holding home diuretic, suspect that WASHINGTON is likely prerenal in s/o overdiuresis, less likely intrinsic vs obstructive. DDx includes UTI vs cardiorenal syndrome.    - hold Valsartan & bumetanide in s/o WASHINGTON; restart as clinically indicated  - Cards consulted  - f/u UPCR, uCr, Eulalia  - renally dose Rx  - cardiac tele w/ pulse ox, supp O2 p.r.n. goal SpO2 > 92%, strict I/Os, daily weights, fall precautions, delirium precautions  - diet diabetic, cardiac/low Na  - hold NSAIDs  - avoid nephrotoxic agents     Chronic respiratory failure with hypoxia and hypercapnia  Patient with Hypercapnic and Hypoxic Respiratory failure which is Chronic.  she is not on home oxygen. Supplemental oxygen was provided and noted-       Signs/symptoms of respiratory failure include- respiratory distress. Contributing diagnoses includes - CHF, COPD and Interstitial lung disease Labs and images were reviewed. Patient Has not had a recent ABG. Will treat underlying causes and adjust management of respiratory failure as follows-     - CPAP q.h.s.  - DuoNebs p.r.n. wheezing, SOB     Type 2 diabetes mellitus with stage 4 chronic kidney disease, with long-term current use of insulin  Patient's FSGs are controlled on current medication regimen.  Last A1c reviewed-   Lab Results   Component Value Date    HGBA1C 5.9 (H) 05/11/2023     Most recent fingerstick glucose reviewed- No results for input(s): POCTGLUCOSE in the last 24 hours.  Current correctional scale  Low  Maintain anti-hyperglycemic dose as follows-   Antihyperglycemics (From admission, onward)      Start     Stop Route Frequency Ordered    06/21/23 0534   insulin aspart U-100 pen 0-5 Units         -- SubQ Before meals & nightly PRN 06/21/23 0036          - LDSSI; titrate p.r.n. goal Glc 140-180  - POCT Glc a.c. & q.h.s.  - diet diabetic  - hold PO antiGlcs     Depression due to physical illness  Patient has persistent depression which is mild and is currently controlled. Will Continue anti-depressant medications. We will not consult psychiatry at this time. Patient does not display psychosis at this time. Continue to monitor closely and adjust plan of care as needed.    - home SSRI     Chronic combined systolic and diastolic heart failure  NICM (nonischemic cardiomyopathy)  ICD (implantable cardioverter-defibrillator) in place    Most recent TTE (03/27) w/ EF 15%, G2 DD, systolic PAP 51, mean PAP 39. Not currently heart transplant candidate per Cards.    - home ISDN-hydralazine & dobutamine  - hold Empaglifozin while inpt  - hold Sacubitril-valsartan & bumetanide in s/o WASHINGTON; restart as clinically indicated     Paroxysmal atrial fibrillation  Patient with Paroxysmal (<7 days) atrial fibrillation which is controlled currently with Amiodarone. Patient is currently in sinus rhythm.[id number] Score: 3. HASBLED Score: 3. Anticoagulation indicated. Anticoagulation done with Apixaban.    MELISSA (obstructive sleep apnea)  - CPAP q.h.s.     Essential hypertension  - hold Sacubitril-valsartan in s/o WASHINGTON; restart as clinically indicated  - home antiHTVs     Microcytic anemia  On admission, Hb 9.7 w/ b/l 8-10, MCV 59. Denies bleeding source.    - home Fe supp       VTE Risk Mitigation (From admission, onward)           Ordered     apixaban tablet 5 mg  2 times daily         06/21/23 0040     IP VTE HIGH RISK PATIENT  Once         06/21/23 0036     Place sequential compression device  Until discontinued         06/21/23 0036                      Andrea De La Cruz MD  Department of Hospital Medicine   [facility] - Emergency Dept

## 2023-06-21 NOTE — ASSESSMENT & PLAN NOTE
NICM (nonischemic cardiomyopathy)  ICD (implantable cardioverter-defibrillator) in place    Most recent TTE (03/27) w/ EF 15%, G2 DD, systolic PAP 51, mean PAP 39. Not currently heart transplant candidate per Cards.    - home ISDN-hydralazine & dobutamine  - hold Empaglifozin while inpt  - hold Sacubitril-valsartan & bumetanide in s/o WASHINGTON; restart as clinically indicated

## 2023-06-21 NOTE — HPI
Patient is a 57 year old female with history of HFrEF (LVEF15%) on dobutamine & s/p AICD, CKD 4, AF (reportedly permanent) on amiodarone & apixaban, chronic resp failure previously on home O2, h/o PCC, DM2, HTN, HLD, MDD; p/w abnormal renal function labs drawn on 06/19 in [facility] f/u clinic. Reports some abdo & flank pain x1 wk, in s/o chronic nausea/vomiting which she reports is her b/l.  Reports that dobutamine pump has been working for her.  Patient was being followed in [facility] clinic however on 05/29/2023, patient was discharged from the [facility] outpatient clinic. She had an outpatient Cardiology visit on 06/19/2023 recommending to hold diuresis. Bmp showed elevated Cr and thus the patient was instructed pt to present to ED for evaluation of WASHINGTON. Denies significant shortness of breath at this time. On room air, resting comfortably in bed. BNP was low in the 300s but patient also taken Entresto at home (per med list). Patient does not recall many medications from her home list except for diuretic.

## 2023-06-21 NOTE — ASSESSMENT & PLAN NOTE
Patient with Paroxysmal (<7 days) atrial fibrillation which is controlled currently with Amiodarone. Patient is currently in sinus rhythm.[id number] Score: 3. HASBLED Score: 3. Anticoagulation indicated. Anticoagulation done with Apixaban.

## 2023-06-21 NOTE — ASSESSMENT & PLAN NOTE
Patient with underlying history of NICM Stage D HFrEF (LVEF 15%) s/p Biotronik ICD on palliative  2.5 mcg/kg/min, CKD 4 that presents with WASHINGTON. Diuretics held by primary with improvement in Cr. No CXR available nor NT-Pro-BNP available for review. Patient is on room air. Physical exam does not show JVD.  drip infusing through PICC line.   Patient appears to be euvolemic at this time and would not require further diuresis. Would restart the patient's home GDMT with Entresto 24/26 mg PO BID + can restart outpatient Jardiance 10 mg PO daily (eGFR > 20). This patient is no longer a candidate for advanced options. She is currently on palliative dobutamine at 2.5 mcg/kg/min. Patient should have either inpatient or outpatient consultation for palliative care. Additionally, as tolerated, the patient should start spironolactone. BB not indicated at this time as patient is on . Patient may benefit from maintenance diuresis at a lower dose from her current home dose as she presented with hypovolemia and WASHINGTON. Would recommend decreasing Bumex to 2 mg PO once daily. Can also continue Bidil on discharge 37.5-20 mg PO TID (if cost prohibitive, please order individual prescriptions of hydralazine 25 mg PO TID + isosorbide dinitrate 10 mg PO TID).  The patient will need to follow up with Dr Fabio Cortez ([facility]). I spoke with him over the phone today and he feels comfortable managing the patient while on dobutamine.

## 2023-06-21 NOTE — NURSING
Discharge instructions reviewed with the patient. Patient verbalized her understanding and denies any questions or concerns at this time. Patient preparing for patient discharge, awaiting patient escort.

## 2023-06-21 NOTE — PLAN OF CARE
- Patient is alert and orientedX4.  - VSS. Afebrile. Spo2 maintained @RA.  - Home dobutamin infusion stopped and hospital infusion contd@ 3.2ml/hr.  - Scheduled MGSO4 contd.  - Bed in low position. Wheels locked. Call light within patient's reach.  - Patient aware of calling for assistance.  - Will continue to monitor.

## 2023-06-21 NOTE — PLAN OF CARE
Patient is AAOx3. Patient c/o nausea this morning, phenergan x 1 dose given per orders. Patient has been very drowsy since taken the phenergan. Patient on a dobutamine drip at 2.5 mcg/kg/hr. Monitoring the patient's blood sugar AC&HS. Patient is NSR on telemetry. Reminded the patient to call for assistance.

## 2023-06-21 NOTE — ASSESSMENT & PLAN NOTE
CKD (chronic kidney disease), stage IV    Pt w/ CKD 4 & severely reduced EF (15%) w/ b/l Cr 1.7-2.0, found to have elevated Cr 2.7 on labs on outpt 06/19 Cards f/u. Symptoms include abdo pain, flank pain, nausea/vomiting. Outpt Cards instructed pt to hold home diuretic; pt has held x2 doses by the time she arrived to ED. On ED arrival, Cr improved to 2.5. Pt not clinically hypervolemic. Imaging w/o signs of inflammatory or infectious intra-abdominal/intrapelvic process. Given clinical euvolemia & Cr improvement after holding home diuretic, suspect that [place] is likely prerenal in s/o overdiuresis, less likely intrinsic vs obstructive. DDx includes UTI vs cardiorenal syndrome.    - hold Valsartan & bumetanide in s/o [place]; restart as clinically indicated  - Cards consulted  - f/u UPCR, uCr, Eulalia  - renally dose Rx  - cardiac tele w/ pulse ox, supp O2 p.r.n. goal SpO2 > 92%, strict I/Os, daily weights, fall precautions, delirium precautions  - diet diabetic, cardiac/low Na  - hold NSAIDs  - avoid nephrotoxic agents

## 2023-06-21 NOTE — HPI
57 y.o. female w/ h/o CKD 4, HFrEF (15%) on dobutamine & s/p AICD, AF on amiodarone & apixaban, chronic resp failure previously on home O2, h/o PCC, DM2, HTN, HLD, MDD; p/w abnormal renal function labs drawn on 06/19 in Cards f/u clinic. Reports some abdo & flank pain x1 wk, in s/o chronic nausea/vomiting which she reports is her b/l.  Reports that dobutamine pump has been working for her.  Outpt Cards held her last 2 doses of bumetanide in s/o [place], & instructed pt to present to ED.  Denies fevers/chills, CP/SOB/cough, abdo pain/nausea/vomiting/constipation/diarrhea, dysuria/hematuria, melena/hematochezia, weakness/numbness/tingling, HA/presyncope/syncope.     ED course: AF, HR 88, /69, 96% ORA.  Labs u/r except for Hb 9.7 (b/l 8-10), Cr 2.5 (was 2.7 on 06/19; b/l 1.7-2.0). UA w/ 2+ protein, 4+ glucose, 1+ blood, trace leukocytes, negative nitrite, 22 WBC, few bacteria. CTAP w/o evidence of acute inflammatory or obstructive intra-abdominal or intrapelvic process; noted stable small pericardial effusion. Given IVF & CTX. Cards consulted; not currently heart transplant candidate.

## 2023-06-21 NOTE — SUBJECTIVE & OBJECTIVE
Past Medical History:   Diagnosis Date    Anemia     Arthritis     Atrial fibrillation     OAC    Chronic respiratory failure with hypoxia, on home oxygen therapy     2L with activity, off at rest.  Per Pulm  no overt evidence of ILD or COPD on PFTs and CT to explain O2 needs.    CKD (chronic kidney disease), stage IV 05/08/2018    Congestive heart failure     s/p AICD placement,    Deep vein thrombosis     Depression     elevated bilirubin d/t Gilbert's syndrome     confirmed by [facility] genetic testing, evaluated by hepatology    Encounter for blood transfusion     GERD (gastroesophageal reflux disease)     Hypertension     Pheochromocytoma, malignant     Right kidney mass     Sleep apnea     Thalassemia trait, alpha     Thyroid disease     Type 2 diabetes mellitus with hyperglycemia, without long-term current use of insulin 08/13/2020       Past Surgical History:   Procedure Laterality Date    APPENDECTOMY      BONE MARROW BIOPSY      CARDIAC DEFIBRILLATOR PLACEMENT Left 12/2016    CARDIAC ELECTROPHYSIOLOGY MAPPING AND ABLATION      CARDIAC ELECTROPHYSIOLOGY MAPPING AND ABLATION      COLONOSCOPY N/A 5/6/2022    Procedure: COLONOSCOPY;  Surgeon: Arely Betancourt MD;  Location: [facility] ([address] [place] [address]);  Service: Endoscopy;  Laterality: N/A;  heart transplant candidate/ EF 25% - 2nd floor/ defib - Biotronik - ER[facility]  Eliquis - per Dr. Cortez with [facility], Pt ok to hold Eliquis x 2 days prior-see media tab-outside correspondence dated 12/30/21  - [facility]  verbal instructions/portal instructions/email instructions - s    EYE SURGERY      due to running tears    FRACTURE SURGERY Left     hand 5th digit    HYSTERECTOMY      KNEE SURGERY Left 2016    hematoma    LIVER BIOPSY  10/24/2018    Minimal steatosis, predominantly macrovesicular, 1%, Minimal nonspecific portal inflammation, no fibrosis. No findings on biopsy to explain elevated bilirubin levels. Could be d/t Gilbert's =?- hemolysis    RIGHT HEART CATHETERIZATION Right  "12/07/2021    Procedure: INSERTION, CATHETER, RIGHT HEART;  Surgeon: Irving Cardenas MD;  Location: [facility] CATH LAB;  Service: Cardiology;  Laterality: Right;    RIGHT HEART CATHETERIZATION Right 12/19/2022    Procedure: INSERTION, CATHETER, RIGHT HEART;  Surgeon: Burke Camilo MD;  Location: [facility] CATH LAB;  Service: Cardiology;  Laterality: Right;    RIGHT HEART CATHETERIZATION Right 3/29/2023    Procedure: INSERTION, CATHETER, RIGHT HEART;  Surgeon: Katie Liriano DO;  Location: [facility] CATH LAB;  Service: Cardiology;  Laterality: Right;    TRANSJUGULAR BIOPSY OF LIVER N/A 10/24/2018    Procedure: BIOPSY, LIVER, TRANSJUGULAR APPROACH;  Surgeon: [facility] Diagnostic Provider;  Location: [facility] OR [address] [place] [address];  Service: Radiology;  Laterality: N/A;       Review of patient's allergies indicates:   Allergen Reactions    Penicillins Hives and Other (See Comments)    Iodinated contrast media Nausea And Vomiting    Oxycodone-acetaminophen Other (See Comments)     Nausea, Dizziness, Anxiety.  "I don't like how it makes me feel."   Given Hydromorphone 0.5mg IVP  Without problems.  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 Metabolism of other drugs not listed here may also be impacted by UGT1A1 enzyme activity.       Tramadol Nausea And Vomiting and Other (See Comments)     Other reaction(s): Other (See Comments)    Valsartan Other (See Comments)       Current " Facility-Administered Medications on File Prior to Encounter   Medication    0.9%  NaCl infusion    vancomycin in dextrose 5 % 1 gram/250 mL IVPB 1,000 mg     Current Outpatient Medications on File Prior to Encounter   Medication Sig    albuterol-ipratropium (DUO-NEB) 2.5 mg-0.5 mg/3 mL nebulizer solution Take 3 mLs by nebulization 2 (two) times a day.    allopurinoL (ZYLOPRIM) 100 MG tablet Take 1 tablet (100 mg total) by mouth daily as needed (gout attack).    ALPRAZolam (XANAX) 2 MG Tab ALPRAZolam 2 mg tablet, [RxNorm: 665473]    amiodarone (PACERONE) 200 MG Tab Take 200 mg by mouth.    atorvastatin (LIPITOR) 40 MG tablet Take 1 tablet (40 mg total) by mouth every evening.    b complex vitamins tablet Take 1 tablet by mouth once daily.    blood sugar diagnostic (ACCU-CHEK GUIDE TEST STRIPS) Strp 1 strip by Other route 3 (three) times daily.    bumetanide (BUMEX) 2 MG tablet Take 2mg in the morning and 1mg in the evening    cetirizine (ZYRTEC) 10 MG tablet Take 10 mg by mouth daily as needed for Allergies.    diclofenac sodium (VOLTAREN) 1 % Gel APPLY 2 GRAMS TOPICALLY 3 (THREE) TIMES DAILY AS NEEDED (PAIN).    dobutamine HCl (DOBUTAMINE IV) Inject into the vein.    ELIQUIS 5 mg Tab TAKE 1 TABLET (5 MG TOTAL) BY MOUTH 2 (TWO) TIMES DAILY.    empagliflozin (JARDIANCE) 10 mg tablet Take 1 tablet (10 mg total) by mouth once daily.    ergocalciferol (ERGOCALCIFEROL) 50,000 unit Cap Take 1 capsule (50,000 Units total) by mouth every Saturday.    EScitalopram oxalate (LEXAPRO) 10 MG tablet Take 1 tablet (10 mg total) by mouth once daily.    ferrous sulfate (FEOSOL) 325 mg (65 mg iron) Tab tablet TAKE 1 TABLET ORALLY 2 TIMES A DAY AFTER FOOD.    fluticasone propionate (FLONASE) 50 mcg/actuation nasal spray 2 sprays by Each Nostril route daily as needed for Rhinitis.     HYDROcodone-acetaminophen (NORCO) 7.5-325 mg per tablet Take 1 tablet by mouth every 8 (eight) hours as needed for Pain.    isosorbide-hydrALAZINE  20-37.5 mg (BIDIL) 20-37.5 mg Tab Take 1 tablet by mouth 3 (three) times daily.    lancets (ACCU-CHEK SOFTCLIX LANCETS) Misc 1 Device by Misc.(Non-Drug; Combo Route) route 3 (three) times daily.    LIDOcaine (LIDODERM) 5 % Place 1 patch onto the skin daily as needed (pain). Remove & Discard patch within 12 hours or as directed by MD (Patient not taking: Reported on 6/19/2023)    LIDOcaine-prilocaine 5-2.5-2.5 % PaCr Apply 1 patch topically once daily. (Patient not taking: Reported on 6/19/2023)    mometasone-formoterol (DULERA) 200-5 mcg/actuation inhaler Inhale 2 puffs into the lungs 2 (two) times daily. Controller    nitroGLYCERIN (NITROSTAT) 0.4 MG SL tablet Nitrostat 0.4 mg sublingual tablet, [RxNorm: 132284]    ondansetron (ZOFRAN) 4 MG tablet ondansetron HCL 4 mg tablet, [RxNorm: 814640]    ondansetron (ZOFRAN-ODT) 8 MG TbDL Take 1 tablet (8 mg total) by mouth every 12 (twelve) hours as needed (nausea).    pantoprazole (PROTONIX) 40 MG tablet TAKE 1 TABLET BY MOUTH DAILY IN THE MORNING    pregabalin (LYRICA) 50 MG capsule Take 1 capsule (50 mg total) by mouth 2 (two) times daily.    promethazine-codeine 6.25-10 mg/5 ml (PHENERGAN WITH CODEINE) 6.25-10 mg/5 mL syrup promethazine 6.25 mg-codeine 10 mg/5 mL syrup, [RxNorm: 871287]    promethazine-codeine 6.25-10 mg/5 ml (PHENERGAN WITH CODEINE) 6.25-10 mg/5 mL syrup TAKE 5 ML BY MOUTH EVERY 4-6 HOURS    rOPINIRole (REQUIP) 4 MG tablet TAKE 1 TABLET (4 MG TOTAL) BY MOUTH ONCE DAILY. PT TAKING 4MG DAILY    sacubitriL-valsartan (ENTRESTO) 49-51 mg per tablet Take 1 tablet by mouth 2 (two) times daily.    semaglutide 0.25 mg or 0.5 mg (2 mg/3 mL) PnIj Inject 0.5 mg into the skin every 7 days.    SPIRIVA WITH HANDIHALER 18 mcg inhalation capsule Inhale 1 capsule (18 mcg total) into the lungs once daily.    traZODone (DESYREL) 50 MG tablet Take 25 mg by mouth nightly as needed.    VENTOLIN HFA 90 mcg/actuation inhaler Inhale 2 puffs into the lungs every 6 (six) hours  as needed for Shortness of Breath or Wheezing.     Family History       Problem Relation (Age of Onset)    Cancer Mother    Cirrhosis Brother    Diabetes Brother    Heart attack Father    Heart disease Father, Sister, Brother, Sister, Brother    Hypertension Father, Brother          Tobacco Use    Smoking status: Never    Smokeless tobacco: Never   Substance and Sexual Activity    Alcohol use: Yes     Comment: up to 1 yr ago drank 2-3 drinks on occasion but sporadic    Drug use: No    Sexual activity: Yes     Partners: Male     Review of Systems   Constitutional: Negative for chills and fever.   Cardiovascular:  Negative for chest pain, orthopnea and palpitations.   Respiratory:  Negative for cough and shortness of breath.    Gastrointestinal:  Negative for abdominal pain.   Neurological:  Negative for dizziness, headaches and light-headedness.   Psychiatric/Behavioral:  Negative for altered mental status.    Objective:     Vital Signs (Most Recent):  Temp: 98.6 °F (37 °C) (06/21/23 0800)  Pulse: 78 (06/21/23 1000)  Resp: 19 (06/21/23 0800)  BP: (!) 143/79 (06/21/23 0800)  SpO2: (!) 94 % (06/21/23 0800) Vital Signs (24h Range):  Temp:  [98.2 °F (36.8 °C)-98.6 °F (37 °C)] 98.6 °F (37 °C)  Pulse:  [66-88] 78  Resp:  [16-19] 19  SpO2:  [94 %-97 %] 94 %  BP: (122-153)/(69-79) 143/79     Weight: 85 kg (187 lb 6.3 oz)  Body mass index is 30.25 kg/m².    SpO2: (!) 94 %         Intake/Output Summary (Last 24 hours) at 6/21/2023 1101  Last data filed at 6/21/2023 0612  Gross per 24 hour   Intake 150 ml   Output --   Net 150 ml       Lines/Drains/Airways       Peripherally Inserted Central Catheter Line  Duration             PICC Double Lumen 05/12/23 1534 right brachial 39 days              Peripheral Intravenous Line  Duration                  Peripheral IV - Single Lumen 06/20/23 2045 20 G Anterior;Left Forearm <1 day                     Physical Exam  Vitals reviewed.   Constitutional:       General: She is not in acute  distress.     Appearance: Normal appearance. She is obese. She is not toxic-appearing.   HENT:      Head: Normocephalic and atraumatic.      Mouth/Throat:      Mouth: Mucous membranes are moist.   Cardiovascular:      Rate and Rhythm: Normal rate and regular rhythm.      Heart sounds: No murmur heard.    No friction rub. No gallop.   Pulmonary:      Effort: Pulmonary effort is normal. No respiratory distress.      Breath sounds: Normal breath sounds.      Comments: On room air  Abdominal:      Palpations: Abdomen is soft.   Musculoskeletal:      Right lower leg: No edema.      Left lower leg: No edema.      Comments: Thin legs  PICC line to arm   Skin:     General: Skin is warm.   Neurological:      Mental Status: She is alert and oriented to person, place, and time. Mental status is at baseline.        Significant Labs: BMP:   Recent Labs   Lab 06/19/23 1320 06/20/23 2024 06/21/23  0402   GLU 87 83 113*    144 142   K 3.9 3.7 3.8    106 106   CO2 25 26 26   BUN 48* 39* 39*   CREATININE 2.7* 2.5* 2.4*   CALCIUM 9.7 9.7 9.3   MG  --   --  1.7   , CMP   Recent Labs   Lab 06/19/23  1320 06/20/23 2024 06/21/23  0402    144 142   K 3.9 3.7 3.8    106 106   CO2 25 26 26   GLU 87 83 113*   BUN 48* 39* 39*   CREATININE 2.7* 2.5* 2.4*   CALCIUM 9.7 9.7 9.3   PROT 6.8 7.3 6.5   ALBUMIN 3.9 4.0 3.5   BILITOT 1.8* 1.9* 1.3*   ALKPHOS 50* 60 52*   AST 22 22 27   ALT 13 13 12   ANIONGAP 12 12 10   , CBC   Recent Labs   Lab 06/19/23  1320 06/20/23 2024 06/21/23  0402   WBC 3.77* 3.94 3.49*   HGB 9.1* 9.7* 8.2*   HCT 31.8* 32.5* 27.3*    181 142*   , INR No results for input(s): INR, PROTIME in the last 48 hours., Troponin No results for input(s): TROPONINI in the last 48 hours., and All pertinent lab results from the last 24 hours have been reviewed.    Significant Imaging: Echocardiogram: Transthoracic echo (TTE) complete (Cupid Only):   Results for orders placed or performed during the hospital  "encounter of 03/26/23   Echo   Result Value Ref Range    BSA 1.99 m2    TDI SEPTAL 0.05 m/s    LV LATERAL E/E' RATIO 15.00 m/s    LV SEPTAL E/E' RATIO 15.00 m/s    LA WIDTH 4.35 cm    TDI LATERAL 0.05 m/s    LVIDd 7.07 (A) 3.5 - 6.0 cm    IVS 1.70 (A) 0.6 - 1.1 cm    Posterior Wall 1.30 0.6 - 1.1 cm    LVIDs 6.10 (A) 2.1 - 4.0 cm    FS 14 28 - 44 %    LA volume 166.06 cm3    Sinus 2.74 cm    STJ 3.31 cm    Ascending aorta 3.19 cm    LV mass 556.17 g    LA size 6.64 cm    RVDD 3.58 cm    TAPSE 1.50 cm    RV S' 9 cm/s    Left Ventricle Relative Wall Thickness 0.37 cm    AV mean gradient 5 mmHg    AV valve area 2.14 cm2    AV Velocity Ratio 0.64     AV index (prosthetic) 0.56     MV valve area p 1/2 method 3.95 cm2    E/A ratio 1.07     Mean e' 0.05 m/s    E wave deceleration time 192.14 msec    MV "A" wave duration 7.14 msec    Pulm vein S/D ratio 1.42     LVOT diameter 2.20 cm    LVOT area 3.8 cm2    LVOT peak marcos 0.89 m/s    LVOT peak VTI 12.67 cm    Ao peak marcos 1.40 m/s    Ao VTI 22.54 cm    LVOT stroke volume 48.14 cm3    AV peak gradient 8 mmHg    E/E' ratio 15.00 m/s    MV Peak E Marcos 0.75 m/s    TR Max Marcos 3.28 m/s    MV stenosis pressure 1/2 time 55.72 ms    MV Peak A Marcos 0.70 m/s    PV Peak S Marcos 0.54 m/s    PV Peak D Marcos 0.38 m/s    LV Systolic Volume 202.54 mL    LV Systolic Volume Index 103.9 mL/m2    LV Diastolic Volume 261.54 mL    LV Diastolic Volume Index 134.12 mL/m2    LA Volume Index 85.2 mL/m2    LV Mass Index 285 g/m2    RA Major Axis 5.91 cm    Left Atrium Minor Axis 6.86 cm    Left Atrium Major Axis 6.67 cm    Triscuspid Valve Regurgitation Peak Gradient 43 mmHg    LA Volume Index (Mod) 40.1 mL/m2    LA volume (mod) 78.16 cm3    RA Width 4.20 cm    Right Atrial Pressure (from IVC) 8 mmHg    EF 15 %    TV rest pulmonary artery pressure 51 mmHg    Narrative    · The left ventricle is severely enlarged with eccentric hypertrophy and   severely decreased systolic function. The estimated ejection " fraction is   15%.  · Normal right ventricular size with moderately reduced right ventricular   systolic function.  · Grade II left ventricular diastolic dysfunction.  · Biatrial enlargement.  · Mild mitral regurgitation.  · Small pericardial effusion.  · The estimated PA systolic pressure is 51 mmHg (by tricuspid   regurgitation velocity). The estimated mean PA pressure is 39mm Hg.  · Intermediate central venous pressure (8 mmHg).

## 2023-06-21 NOTE — ASSESSMENT & PLAN NOTE
Patient has persistent depression which is mild and is currently controlled. Will Continue anti-depressant medications. We will not consult psychiatry at this time. Patient does not display psychosis at this time. Continue to monitor closely and adjust plan of care as needed.    - home SSRI

## 2023-06-21 NOTE — ASSESSMENT & PLAN NOTE
Rate controlled, in NSR with 1* AV block on presentation ECG 06/21/2023 (independently reviewed)  Continue amiodarone and AC

## 2023-06-21 NOTE — NURSING
Patient came to the TSU from ED on a stretcher. She is alert and orientedX4. Independent. Home Dobutamin infusion contd. Discussed for switching on to the hospital dobutamin infusion .Will continue to monitor.

## 2023-06-21 NOTE — ASSESSMENT & PLAN NOTE
Per last interrogation of device on 06/07/2023, lasted 5 seconds  Continue current therapies with amiodarone   Biotronik ICD in place , followed by Dr MARCI Pretty

## 2023-06-21 NOTE — DISCHARGE SUMMARY
Arie Vazquez - Transplant [facility] Medicine  Discharge Summary      Patient Name: Hafsa Hawley  MRN: [id number]  GLENN: [id number]  Patient Class: OP- Observation  Admission Date: 6/20/2023  Hospital Length of Stay: 0 days  Discharge Date and Time:  06/21/2023 3:37 PM  Attending Physician: Krysta Hansen MD   Discharging Provider: Asif Henderson MD  Primary Care Provider: Cristobal Ann MD  [facility] Medicine Team: [facility] MED 2 Asif Henderson MD  Primary Care Team: [facility] MED 2    HPI:   57 y.o. female w/ h/o CKD 4, HFrEF (15%) on dobutamine & s/p AICD, AF on amiodarone & apixaban, chronic resp failure previously on home O2, h/o PCC, DM2, HTN, HLD, MDD; p/w abnormal renal function labs drawn on 06/19 in Cards f/u clinic. Reports some abdo & flank pain x1 wk, in s/o chronic nausea/vomiting which she reports is her b/l.  Reports that dobutamine pump has been working for her.  Outpt Cards held her last 2 doses of bumetanide in s/o W[place], & instructed pt to present to ED.  Denies fevers/chills, CP/SOB/cough, abdo pain/nausea/vomiting/constipation/diarrhea, dysuria/hematuria, melena/hematochezia, weakness/numbness/tingling, HA/presyncope/syncope.     ED course: AF, HR 88, /69, 96% ORA.  Labs u/r except for Hb 9.7 (b/l 8-10), Cr 2.5 (was 2.7 on 06/19; b/l 1.7-2.0). UA w/ 2+ protein, 4+ glucose, 1+ blood, trace leukocytes, negative nitrite, 22 WBC, few bacteria. CTAP w/o evidence of acute inflammatory or obstructive intra-abdominal or intrapelvic process; noted stable small pericardial effusion. Given IVF & CTX. Cards consulted; not currently heart transplant candidate.      * No surgery found *      Hospital Course:   Patient admitted to  for WASHINGTON (cr 2.7) noted on lab work in cardiology clinic. On admit, patient's Cr 2.5 on admit after holding bumex outpatient. Patient observed overnight with continued improvement in renal function while holding medication. Cardiology consulted to assess patient with  following recommendations:    - Resume GDMT Entresto 24/26mg BID, Jardiance 10mg daily, Bidil 35.7-20mg  - Continue palliative dobutamine ggt  - Decreasing Bumex to 2mg once daily  - Consider starting spironolactone in outpatient setting  - Follow up with cardiology (Dr. Benson Cortez)     Medications changes made and referral placed via HM. Will need close follow up for repeat BMP to monitor renal function. Patient verbalizes understanding, given precautions on when to return to ED.      Vital Signs (Most Recent):  Temp: 98.4 °F (36.9 °C) (06/20/23 2027)  Pulse: 73 (06/20/23 2027)  Resp: 16 (06/20/23 2027)  BP: 122/72 (06/20/23 2027)  SpO2: 96 % (06/20/23 2027) Vital Signs (24h Range):  Temp:  [98.2 °F (36.8 °C)-98.4 °F (36.9 °C)] 98.4 °F (36.9 °C)  Pulse:  [73-88] 73  Resp:  [16-18] 16  SpO2:  [96 %] 96 %  BP: (122-123)/(69-72) 122/72     Weight: 83.5 kg (184 lb)  Body mass index is 29.7 kg/m².     Physical Exam  Vitals and nursing note reviewed.   Constitutional:       General: She is not in acute distress.     Appearance: She is not ill-appearing, toxic-appearing or diaphoretic.   HENT:      Head: Normocephalic and atraumatic.      Right Ear: External ear normal.      Left Ear: External ear normal.      Mouth/Throat:      Mouth: Mucous membranes are moist.      Pharynx: No oropharyngeal exudate.   Eyes:      Extraocular Movements: Extraocular movements intact.      Pupils: Pupils are equal, round, and reactive to light.   Cardiovascular:      Rate and Rhythm: Normal rate and regular rhythm.      Pulses: Normal pulses.      Heart sounds: Normal heart sounds. No murmur heard.  Pulmonary:      Effort: Pulmonary effort is normal. No respiratory distress.      Breath sounds: Normal breath sounds. No wheezing or rales.   Abdominal:      General: Abdomen is flat. Bowel sounds are normal. There is no distension.      Palpations: Abdomen is soft. There is no mass.   Musculoskeletal:         General: No swelling or  tenderness. Normal range of motion.      Cervical back: Normal range of motion and neck supple.      Right lower leg: No edema.      Left lower leg: No edema.   Skin:     General: Skin is warm and dry.      Capillary Refill: Capillary refill takes less than 2 seconds.      Comments: Port in [place]   Neurological:      General: No focal deficit present.      Mental Status: She is alert and oriented to person, place, and time. Mental status is at baseline.   Psychiatric:         Mood and Affect: Mood normal.         Behavior: Behavior normal.        Goals of Care Treatment Preferences:  Code Status: Full Code    Health care agent: Erika Estrada  [facility] care agent number: No value filed.          What is most important right now is to focus on curative/life-prolongation (regardless of treatment burdens), remaining as independent as possible, symptom/pain control.  Accordingly, we have decided that the best plan to meet the patient's goals includes continuing with treatment.      Consults:   Consults (From admission, onward)        Status Ordering Provider     Inpatient consult to Cardiology  Once        Provider:  (Not yet assigned)    Completed PATIENCE RODRIGUEZ          Psychiatric  Depression due to physical illness  Patient has persistent depression which is mild and is currently controlled. Will Continue anti-depressant medications. We will not consult psychiatry at this time. Patient does not display psychosis at this time. Continue to monitor closely and adjust plan of care as needed.    - home SSRI     Pulmonary  Chronic respiratory failure with hypoxia and hypercapnia  Patient with Hypercapnic and Hypoxic Respiratory failure which is Chronic.  she is not on home oxygen. Supplemental oxygen was provided and noted-       Signs/symptoms of respiratory failure include- respiratory distress. Contributing diagnoses includes - CHF, COPD and Interstitial lung disease Labs and images were reviewed. Patient Has not had a  recent ABG. Will treat underlying causes and adjust management of respiratory failure as follows-     - CPAP q.h.s.  - DuoNebs p.r.n. wheezing, SOB     Cardiac/Vascular  Chronic combined systolic and diastolic heart failure  NICM (nonischemic cardiomyopathy)  ICD (implantable cardioverter-defibrillator) in place    Most recent TTE (03/27) w/ EF 15%, G2 DD, systolic PAP 51, mean PAP 39. Not currently heart transplant candidate per Cards.    - home ISDN-hydralazine & dobutamine  - hold Empaglifozin while inpt  - hold Sacubitril-valsartan & bumetanide in s/o W[place]; restart as clinically indicated     Permanent atrial fibrillation  Patient with Paroxysmal (<7 days) atrial fibrillation which is controlled currently with Amiodarone. Patient is currently in sinus rhythm.[id number] Score: 3. HASBLED Score: 3. Anticoagulation indicated. Anticoagulation done with Apixaban.    Essential hypertension  - hold Sacubitril-valsartan in s/o W[place]; restart as clinically indicated  - home antiHTVs     Renal/  * Acute kidney injury superimposed on chronic kidney disease  CKD (chronic kidney disease), stage IV    Pt w/ CKD 4 & severely reduced EF (15%) w/ b/l Cr 1.7-2.0, found to have elevated Cr 2.7 on labs on outpt 06/19 Cards f/u. Symptoms include abdo pain, flank pain, nausea/vomiting. Outpt Cards instructed pt to hold home diuretic; pt has held x2 doses by the time she arrived to ED. On ED arrival, Cr improved to 2.5. Pt not clinically hypervolemic. Imaging w/o signs of inflammatory or infectious intra-abdominal/intrapelvic process. Given clinical euvolemia & Cr improvement after holding home diuretic, suspect that WASHINGTON is likely prerenal in s/o overdiuresis, less likely intrinsic vs obstructive. DDx includes UTI vs cardiorenal syndrome.    - hold Valsartan & bumetanide in s/o [place]; restart as clinically indicated  - Cards consulted  - f/u UPCR, uCr, Eulalia  - renally dose Rx  - cardiac tele w/ pulse ox, supp O2 p.r.n. goal SpO2 > 92%,  strict I/Os, daily weights, fall precautions, delirium precautions  - diet diabetic, cardiac/low Na  - hold NSAIDs  - avoid nephrotoxic agents     Oncology  Microcytic anemia  On admission, Hb 9.7 w/ b/l 8-10, MCV 59. Denies bleeding source.    - home Fe supp     Endocrine  Type 2 diabetes mellitus with stage 4 chronic kidney disease, with long-term current use of insulin  Patient's FSGs are controlled on current medication regimen.  Last A1c reviewed-   Lab Results   Component Value Date    HGBA1C 5.9 (H) 05/11/2023     Most recent fingerstick glucose reviewed- No results for input(s): POCTGLUCOSE in the last 24 hours.  Current correctional scale  Low  Maintain anti-hyperglycemic dose as follows-   Antihyperglycemics (From admission, onward)    Start     Stop Route Frequency Ordered    06/21/23 0134  insulin aspart U-100 pen 0-5 Units         -- SubQ Before meals & nightly PRN 06/21/23 0036        - LDSSI; titrate p.r.n. goal Glc 140-180  - POCT Glc a.c. & q.h.s.  - diet diabetic  - hold PO antiGlcs     Other  MELISSA (obstructive sleep apnea)  - CPAP q.h.s.       Final Active Diagnoses:    Diagnosis Date Noted POA    PRINCIPAL PROBLEM:  Acute kidney injury superimposed on chronic kidney disease [N17.9, N18.9] 10/28/2021 Yes    NSVT (nonsustained ventricular tachycardia) [I47.29] 11/02/2022 Yes    CKD (chronic kidney disease), stage IV [N18.4] 08/23/2022 Yes     Chronic    Chronic respiratory failure with hypoxia and hypercapnia [J96.11, J96.12] 03/16/2021 Yes     Chronic    Type 2 diabetes mellitus with stage 4 chronic kidney disease, with long-term current use of insulin [E11.22, N18.4, Z79.4] 08/13/2020 Not Applicable     Chronic    ICD (implantable cardioverter-defibrillator) in place [Z95.810] 07/24/2018 Yes     Chronic    Depression due to physical illness [F06.31] 05/08/2018 Yes     Chronic    Chronic combined systolic and diastolic heart failure [I50.42] 03/16/2018 Yes     Chronic    NICM (nonischemic  cardiomyopathy) [I42.8] 10/27/2016 Yes     Chronic    Permanent atrial fibrillation [I48.21] 08/25/2016 Yes    MELISSA (obstructive sleep apnea) [G47.33] 08/23/2016 Yes     Chronic    Essential hypertension [I10] 07/22/2016 Yes     Chronic    Microcytic anemia [D50.9] 07/20/2016 Yes     Chronic      Problems Resolved During this Admission:       Discharged Condition: good    Disposition: Home or Self Care    Follow Up:   Follow-up Information     Cristobal Ann MD Follow up in 1 week(s).    Specialty: Family Medicine  Why: Please follow up with you PCP in 7-10 days  Contact information:  [address] [facility] [address]  [phone]             Benson Aguilar MD Follow up in 1 week(s).    Specialties: Interventional Cardiology, Cardiology  Why: Please follow up with your cardiologist in 7-10 days  Contact information:  [address] [facility] [address]  [phone]                       Patient Instructions:      Ambulatory referral/consult to Cardiology   Standing Status: Future   Referral Priority: Urgent Referral Type: Consultation   Referral Reason: Specialty Services Required   Referred to Provider: BENSON AGUILAR Requested Specialty: Cardiology   Number of Visits Requested: 1     Diet Cardiac     Notify your health care provider if you experience any of the following:  persistent nausea and vomiting or diarrhea     Notify your health care provider if you experience any of the following:  temperature >100.4     Notify your health care provider if you experience any of the following:  difficulty breathing or increased cough     Notify your health care provider if you experience any of the following:  increased confusion or weakness     Activity as tolerated       Significant Diagnostic Studies: Labs: All labs within the past 24 hours have been reviewed    Pending Diagnostic Studies:     None         Medications:  Reconciled Home Medications:      Medication List      START taking these  medications    ENTRESTO 24-26 mg per tablet  Generic drug: sacubitriL-valsartan  Take 1 tablet by mouth 2 (two) times daily.  Replaces: sacubitriL-valsartan 49-51 mg per tablet        CHANGE how you take these medications    bumetanide 2 MG tablet  Commonly known as: BUMEX  Take 1 tablet (2 mg total) by mouth once daily.  What changed:   · how much to take  · how to take this  · when to take this  · additional instructions        CONTINUE taking these medications    albuterol-ipratropium 2.5 mg-0.5 mg/3 mL nebulizer solution  Commonly known as: DUO-NEB  Take 3 mLs by nebulization 2 (two) times a day.     allopurinoL 100 MG tablet  Commonly known as: ZYLOPRIM  Take 1 tablet (100 mg total) by mouth daily as needed (gout attack).     ALPRAZolam 2 MG Tab  Commonly known as: XANAX  ALPRAZolam 2 mg tablet, [RxNorm: 888293]     amiodarone 200 MG Tab  Commonly known as: PACERONE  Take 200 mg by mouth.     atorvastatin 40 MG tablet  Commonly known as: LIPITOR  Take 1 tablet (40 mg total) by mouth every evening.     b complex vitamins tablet  Take 1 tablet by mouth once daily.     blood sugar diagnostic Strp  Commonly known as: ACCU-CHEK GUIDE TEST STRIPS  1 strip by Other route 3 (three) times daily.     cetirizine 10 MG tablet  Commonly known as: ZYRTEC  Take 10 mg by mouth daily as needed for Allergies.     diclofenac sodium 1 % Gel  Commonly known as: VOLTAREN  APPLY 2 GRAMS TOPICALLY 3 (THREE) TIMES DAILY AS NEEDED (PAIN).     DOBUTAMINE IV  Inject into the vein.     DULERA 200-5 mcg/actuation inhaler  Generic drug: mometasone-formoterol  Inhale 2 puffs into the lungs 2 (two) times daily. Controller     ELIQUIS 5 mg Tab  Generic drug: apixaban  TAKE 1 TABLET (5 MG TOTAL) BY MOUTH 2 (TWO) TIMES DAILY.     ergocalciferol 50,000 unit Cap  Commonly known as: ERGOCALCIFEROL  Take 1 capsule (50,000 Units total) by mouth every Saturday.     EScitalopram oxalate 10 MG tablet  Commonly known as: LEXAPRO  Take 1 tablet (10 mg  total) by mouth once daily.     ferrous sulfate 325 mg (65 mg iron) Tab tablet  Commonly known as: FEOSOL  TAKE 1 TABLET ORALLY 2 TIMES A DAY AFTER FOOD.     fluticasone propionate 50 mcg/actuation nasal spray  Commonly known as: FLONASE  2 sprays by Each Nostril route daily as needed for Rhinitis.     HYDROcodone-acetaminophen 7.5-325 mg per tablet  Commonly known as: NORCO  Take 1 tablet by mouth every 8 (eight) hours as needed for Pain.     isosorbide-hydrALAZINE 20-37.5 mg 20-37.5 mg Tab  Commonly known as: BIDIL  Take 1 tablet by mouth 3 (three) times daily.     JARDIANCE 10 mg tablet  Generic drug: empagliflozin  Take 1 tablet (10 mg total) by mouth once daily.     lancets Misc  Commonly known as: ACCU-CHEK SOFTCLIX LANCETS  1 Device by Misc.(Non-Drug; Combo Route) route 3 (three) times daily.     nitroGLYCERIN 0.4 MG SL tablet  Commonly known as: NITROSTAT  Nitrostat 0.4 mg sublingual tablet, [RxNorm: 681572]     ondansetron 4 MG tablet  Commonly known as: ZOFRAN  ondansetron HCL 4 mg tablet, [RxNorm: 969405]     ondansetron 8 MG Tbdl  Commonly known as: ZOFRAN-ODT  Take 1 tablet (8 mg total) by mouth every 12 (twelve) hours as needed (nausea).     pantoprazole 40 MG tablet  Commonly known as: PROTONIX  TAKE 1 TABLET BY MOUTH DAILY IN THE MORNING     pregabalin 50 MG capsule  Commonly known as: LYRICA  Take 1 capsule (50 mg total) by mouth 2 (two) times daily.     rOPINIRole 4 MG tablet  Commonly known as: REQUIP  TAKE 1 TABLET (4 MG TOTAL) BY MOUTH ONCE DAILY. PT TAKING 4MG DAILY     semaglutide 0.25 mg or 0.5 mg (2 mg/3 mL) pen injector  Commonly known as: OZEMPIC  Inject 0.5 mg into the skin every 7 days.     SPIRIVA WITH HANDIHALER 18 mcg inhalation capsule  Generic drug: tiotropium  Inhale 1 capsule (18 mcg total) into the lungs once daily.     traZODone 50 MG tablet  Commonly known as: DESYREL  Take 25 mg by mouth nightly as needed.     VENTOLIN HFA 90 mcg/actuation inhaler  Generic drug:  albuterol  Inhale 2 puffs into the lungs every 6 (six) hours as needed for Shortness of Breath or Wheezing.        STOP taking these medications    LIDOcaine 5 %  Commonly known as: LIDODERM     LIDOcaine-prilocaine 5-2.5-2.5 % Pacr     promethazine-codeine 6.25-10 mg/5 ml 6.25-10 mg/5 mL syrup  Commonly known as: PHENERGAN with CODEINE     sacubitriL-valsartan 49-51 mg per tablet  Commonly known as: ENTRESTO  Replaced by: ENTRESTO 24-26 mg per tablet            Indwelling Lines/Drains at time of discharge:   Lines/Drains/Airways     Peripherally Inserted Central Catheter Line  Duration           PICC Double Lumen 05/12/23 1534 right brachial 40 days                Time spent on the discharge of patient: 30 minutes         Asif Henderson MD  Department of Hospital Medicine  [facility] - Transplant Stepdown

## 2023-06-21 NOTE — SUBJECTIVE & OBJECTIVE
Past Medical History:   Diagnosis Date    Anemia     Arthritis     Atrial fibrillation     OAC    Chronic respiratory failure with hypoxia, on home oxygen therapy     2L with activity, off at rest.  Per Pulm  no overt evidence of ILD or COPD on PFTs and CT to explain O2 needs.    CKD (chronic kidney disease), stage IV 05/08/2018    Congestive heart failure     s/p AICD placement,    Deep vein thrombosis     Depression     elevated bilirubin d/t Gilbert's syndrome     confirmed by [place] genetic testing, evaluated by hepatology    Encounter for blood transfusion     GERD (gastroesophageal reflux disease)     Hypertension     Pheochromocytoma, malignant     Right kidney mass     Sleep apnea     Thalassemia trait, alpha     Thyroid disease     Type 2 diabetes mellitus with hyperglycemia, without long-term current use of insulin 08/13/2020       Past Surgical History:   Procedure Laterality Date    APPENDECTOMY      BONE MARROW BIOPSY      CARDIAC DEFIBRILLATOR PLACEMENT Left 12/2016    CARDIAC ELECTROPHYSIOLOGY MAPPING AND ABLATION      CARDIAC ELECTROPHYSIOLOGY MAPPING AND ABLATION      COLONOSCOPY N/A 5/6/2022    Procedure: COLONOSCOPY;  Surgeon: Arely Betancourt MD;  Location: [facility] ([address] [place] [address]);  Service: Endoscopy;  Laterality: N/A;  heart transplant candidate/ EF 25% - 2nd floor/ defib - Biotronik - ERW  Eliquis - per Dr. Cortez with [facility], Pt ok to hold Eliquis x 2 days prior-see media tab-outside correspondence dated 12/30/21  - ERW  verbal instructions/portal instructions/email instructions - s    EYE SURGERY      due to running tears    FRACTURE SURGERY Left     hand 5th digit    HYSTERECTOMY      KNEE SURGERY Left 2016    hematoma    LIVER BIOPSY  10/24/2018    Minimal steatosis, predominantly macrovesicular, 1%, Minimal nonspecific portal inflammation, no fibrosis. No findings on biopsy to explain elevated bilirubin levels. Could be d/t Gilbert's =?- hemolysis    RIGHT HEART CATHETERIZATION Right  "12/07/2021    Procedure: INSERTION, CATHETER, RIGHT HEART;  Surgeon: Irving Cardenas MD;  Location: [facility] CATH LAB;  Service: Cardiology;  Laterality: Right;    RIGHT HEART CATHETERIZATION Right 12/19/2022    Procedure: INSERTION, CATHETER, RIGHT HEART;  Surgeon: Burke Camilo MD;  Location: [facility] CATH LAB;  Service: Cardiology;  Laterality: Right;    RIGHT HEART CATHETERIZATION Right 3/29/2023    Procedure: INSERTION, CATHETER, RIGHT HEART;  Surgeon: Katie Liriano DO;  Location: [facility] CATH LAB;  Service: Cardiology;  Laterality: Right;    TRANSJUGULAR BIOPSY OF LIVER N/A 10/24/2018    Procedure: BIOPSY, LIVER, TRANSJUGULAR APPROACH;  Surgeon: [facility] Diagnostic Provider;  Location: [facility] OR [address] [place] [address];  Service: Radiology;  Laterality: N/A;       Review of patient's allergies indicates:   Allergen Reactions    Penicillins Hives and Other (See Comments)    Iodinated contrast media Nausea And Vomiting    Oxycodone-acetaminophen Other (See Comments)     Nausea, Dizziness, Anxiety.  "I don't like how it makes me feel."   Given Hydromorphone 0.5mg IVP  Without problems.  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 Metabolism of other drugs not listed here may also be impacted by UGT1A1 enzyme activity.       Tramadol Nausea And Vomiting and Other (See Comments)     Other reaction(s): Other (See Comments)    Valsartan Other (See Comments)       Current " Facility-Administered Medications on File Prior to Encounter   Medication    0.9%  NaCl infusion    vancomycin in dextrose 5 % 1 gram/250 mL IVPB 1,000 mg     Current Outpatient Medications on File Prior to Encounter   Medication Sig    albuterol-ipratropium (DUO-NEB) 2.5 mg-0.5 mg/3 mL nebulizer solution Take 3 mLs by nebulization 2 (two) times a day.    allopurinoL (ZYLOPRIM) 100 MG tablet Take 1 tablet (100 mg total) by mouth daily as needed (gout attack).    ALPRAZolam (XANAX) 2 MG Tab ALPRAZolam 2 mg tablet, [RxNorm: 788723]    amiodarone (PACERONE) 200 MG Tab Take 200 mg by mouth.    atorvastatin (LIPITOR) 40 MG tablet Take 1 tablet (40 mg total) by mouth every evening.    b complex vitamins tablet Take 1 tablet by mouth once daily.    blood sugar diagnostic (ACCU-CHEK GUIDE TEST STRIPS) Strp 1 strip by Other route 3 (three) times daily.    bumetanide (BUMEX) 2 MG tablet Take 2mg in the morning and 1mg in the evening    cetirizine (ZYRTEC) 10 MG tablet Take 10 mg by mouth daily as needed for Allergies.    diclofenac sodium (VOLTAREN) 1 % Gel APPLY 2 GRAMS TOPICALLY 3 (THREE) TIMES DAILY AS NEEDED (PAIN).    dobutamine HCl (DOBUTAMINE IV) Inject into the vein.    ELIQUIS 5 mg Tab TAKE 1 TABLET (5 MG TOTAL) BY MOUTH 2 (TWO) TIMES DAILY.    empagliflozin (JARDIANCE) 10 mg tablet Take 1 tablet (10 mg total) by mouth once daily.    ergocalciferol (ERGOCALCIFEROL) 50,000 unit Cap Take 1 capsule (50,000 Units total) by mouth every Saturday.    EScitalopram oxalate (LEXAPRO) 10 MG tablet Take 1 tablet (10 mg total) by mouth once daily.    ferrous sulfate (FEOSOL) 325 mg (65 mg iron) Tab tablet TAKE 1 TABLET ORALLY 2 TIMES A DAY AFTER FOOD.    fluticasone propionate (FLONASE) 50 mcg/actuation nasal spray 2 sprays by Each Nostril route daily as needed for Rhinitis.     HYDROcodone-acetaminophen (NORCO) 7.5-325 mg per tablet Take 1 tablet by mouth every 8 (eight) hours as needed for Pain.    isosorbide-hydrALAZINE  20-37.5 mg (BIDIL) 20-37.5 mg Tab Take 1 tablet by mouth 3 (three) times daily.    lancets (ACCU-CHEK SOFTCLIX LANCETS) Misc 1 Device by Misc.(Non-Drug; Combo Route) route 3 (three) times daily.    LIDOcaine (LIDODERM) 5 % Place 1 patch onto the skin daily as needed (pain). Remove & Discard patch within 12 hours or as directed by MD (Patient not taking: Reported on 6/19/2023)    LIDOcaine-prilocaine 5-2.5-2.5 % PaCr Apply 1 patch topically once daily. (Patient not taking: Reported on 6/19/2023)    mometasone-formoterol (DULERA) 200-5 mcg/actuation inhaler Inhale 2 puffs into the lungs 2 (two) times daily. Controller    nitroGLYCERIN (NITROSTAT) 0.4 MG SL tablet Nitrostat 0.4 mg sublingual tablet, [RxNorm: 155428]    ondansetron (ZOFRAN) 4 MG tablet ondansetron HCL 4 mg tablet, [RxNorm: 031956]    ondansetron (ZOFRAN-ODT) 8 MG TbDL Take 1 tablet (8 mg total) by mouth every 12 (twelve) hours as needed (nausea).    pantoprazole (PROTONIX) 40 MG tablet TAKE 1 TABLET BY MOUTH DAILY IN THE MORNING    pregabalin (LYRICA) 50 MG capsule Take 1 capsule (50 mg total) by mouth 2 (two) times daily.    promethazine-codeine 6.25-10 mg/5 ml (PHENERGAN WITH CODEINE) 6.25-10 mg/5 mL syrup promethazine 6.25 mg-codeine 10 mg/5 mL syrup, [RxNorm: 744044]    promethazine-codeine 6.25-10 mg/5 ml (PHENERGAN WITH CODEINE) 6.25-10 mg/5 mL syrup TAKE 5 ML BY MOUTH EVERY 4-6 HOURS    rOPINIRole (REQUIP) 4 MG tablet TAKE 1 TABLET (4 MG TOTAL) BY MOUTH ONCE DAILY. PT TAKING 4MG DAILY    sacubitriL-valsartan (ENTRESTO) 49-51 mg per tablet Take 1 tablet by mouth 2 (two) times daily.    semaglutide 0.25 mg or 0.5 mg (2 mg/3 mL) PnIj Inject 0.5 mg into the skin every 7 days.    SPIRIVA WITH HANDIHALER 18 mcg inhalation capsule Inhale 1 capsule (18 mcg total) into the lungs once daily.    traZODone (DESYREL) 50 MG tablet Take 25 mg by mouth nightly as needed.    VENTOLIN HFA 90 mcg/actuation inhaler Inhale 2 puffs into the lungs every 6 (six) hours  as needed for Shortness of Breath or Wheezing.     Family History       Problem Relation (Age of Onset)    Cancer Mother    Cirrhosis Brother    Diabetes Brother    Heart attack Father    Heart disease Father, Sister, Brother, Sister, Brother    Hypertension Father, Brother          Tobacco Use    Smoking status: Never    Smokeless tobacco: Never   Substance and Sexual Activity    Alcohol use: Yes     Comment: up to 1 yr ago drank 2-3 drinks on occasion but sporadic    Drug use: No    Sexual activity: Yes     Partners: Male     Review of Systems   Constitutional:  Negative for chills, fever and unexpected weight change.   HENT:  Negative for sinus pain, trouble swallowing and voice change.    Eyes:  Negative for photophobia, pain and visual disturbance.   Respiratory:  Negative for cough, shortness of breath and wheezing.    Cardiovascular:  Negative for chest pain, palpitations and leg swelling.   Gastrointestinal:  Positive for abdominal pain, nausea and vomiting. Negative for blood in stool, constipation and diarrhea.   Endocrine: Negative for polydipsia and polyuria.   Genitourinary:  Positive for flank pain. Negative for dysuria, frequency, hematuria and urgency.   Musculoskeletal:  Positive for myalgias. Negative for arthralgias and back pain.   Skin:  Negative for color change and pallor.   Neurological:  Negative for syncope, weakness, numbness and headaches.   Hematological:  Does not bruise/bleed easily.   Psychiatric/Behavioral:  Negative for confusion, dysphoric mood and sleep disturbance.    Objective:     Vital Signs (Most Recent):  Temp: 98.4 °F (36.9 °C) (06/20/23 2027)  Pulse: 73 (06/20/23 2027)  Resp: 16 (06/20/23 2027)  BP: 122/72 (06/20/23 2027)  SpO2: 96 % (06/20/23 2027) Vital Signs (24h Range):  Temp:  [98.2 °F (36.8 °C)-98.4 °F (36.9 °C)] 98.4 °F (36.9 °C)  Pulse:  [73-88] 73  Resp:  [16-18] 16  SpO2:  [96 %] 96 %  BP: (122-123)/(69-72) 122/72     Weight: 83.5 kg (184 lb)  Body mass index is  29.7 kg/m².     Physical Exam  Vitals and nursing note reviewed.   Constitutional:       General: She is not in acute distress.     Appearance: She is not ill-appearing, toxic-appearing or diaphoretic.   HENT:      Head: Normocephalic and atraumatic.      Right Ear: External ear normal.      Left Ear: External ear normal.      Mouth/Throat:      Mouth: Mucous membranes are moist.      Pharynx: No oropharyngeal exudate.   Eyes:      Extraocular Movements: Extraocular movements intact.      Pupils: Pupils are equal, round, and reactive to light.   Cardiovascular:      Rate and Rhythm: Normal rate and regular rhythm.      Pulses: Normal pulses.      Heart sounds: Normal heart sounds. No murmur heard.  Pulmonary:      Effort: Pulmonary effort is normal. No respiratory distress.      Breath sounds: Normal breath sounds. No wheezing or rales.   Abdominal:      General: Abdomen is flat. Bowel sounds are normal. There is no distension.      Palpations: Abdomen is soft. There is no mass.      Tenderness: There is abdominal tenderness.   Musculoskeletal:         General: No swelling or tenderness. Normal range of motion.      Cervical back: Normal range of motion and neck supple.      Right lower leg: No edema.      Left lower leg: No edema.   Skin:     General: Skin is warm and dry.      Capillary Refill: Capillary refill takes less than 2 seconds.      Comments: Port in RUE   Neurological:      General: No focal deficit present.      Mental Status: She is alert and oriented to person, place, and time. Mental status is at baseline.   Psychiatric:         Mood and Affect: Mood normal.         Behavior: Behavior normal.            CRANIAL NERVES     CN III, IV, VI   Pupils are equal, round, and reactive to light.     Significant Labs: All pertinent labs within the past 24 hours have been reviewed.    Significant Imaging: I have reviewed and interpreted all pertinent imaging results/findings within the past 24 hours.

## 2023-06-21 NOTE — PLAN OF CARE
[facility] - Transplant Stepdown  Initial Discharge Assessment       Primary Care Provider: Cristobal Ann MD    Admission Diagnosis: CHF (congestive heart failure) [I50.9]  [place] (acute kidney injury) [N17.9]  Chest pain [R07.9]    Admission Date: 6/20/2023  Expected Discharge Date: 6/23/2023         Payor: MEDICAID / Plan: [facility] / Product Type: Managed Medicaid /     Extended Emergency Contact Information  Primary Emergency Contact: Jaye Baum  Address: [address] [place] [address] [facility]  Home Phone: [phone]  Relation: Sister  Secondary Emergency Contact: Jeanie Jaimes  Mobile Phone: [phone]  Relation: Sister    Discharge Plan A: Home  Discharge Plan B: Home with family      [id number] [place] - [address]  [address]  [place] [address]  Phone: [phone] Fax: [phone]    [facility] [address] [place] [address]  Phone: [phone] Fax: [phone]      Initial Assessment (most recent)       Adult Discharge Assessment - 06/21/23 1006          Discharge Assessment    Assessment Type Discharge Planning Assessment     Confirmed/corrected address, phone number and insurance Yes     Confirmed Demographics Correct on Facesheet     Source of Information patient     Communicated CHIQUI with patient/caregiver Date not available/Unable to determine     Reason For Admission Acute kidney injury superimposed on chronic kidney disease     People in Home spouse     Do you expect to return to your current living situation? Yes     Do you have help at home or someone to help you manage your care at home? Yes     Who are your caregiver(s) and their phone number(s)? The patient reported that her   Jerrod will assist with her at home upon d/c.     Equipment Currently Used at Home none   The patient reported that she does not use any equipment but her records show that she has a cane and walker.    Patient currently  being followed by outpatient case management? No     Do you currently have service(s) that help you manage your care at home? No     Do you take prescription medications? Yes     Do you have prescription coverage? Yes     Coverage MEDICAID - [employer] ([employer]     Is the patient taking medications as prescribed? yes     Who is going to help you get home at discharge? Jerrod -      Are you on dialysis? No     Do you take coumadin? No     Discharge Plan A Home     Discharge Plan B Home with family     DME Needed Upon Discharge  other (see comments)   TBD    Discharge Plan discussed with: Patient        Physical Activity    On average, how many days per week do you engage in moderate to strenuous exercise (like a brisk walk)? Patient refused     On average, how many minutes do you engage in exercise at this level? Patient refused        Financial Resource Strain    How hard is it for you to pay for the very basics like food, housing, medical care, and heating? Patient refused        Housing Stability    In the last 12 months, was there a time when you were not able to pay the mortgage or rent on time? Patient refused     In the last 12 months, was there a time when you did not have a steady place to sleep or slept in a shelter (including now)? Patient refused        Transportation Needs    In the past 12 months, has lack of transportation kept you from medical appointments or from getting medications? Patient refused     In the past 12 months, has lack of transportation kept you from meetings, work, or from getting things needed for daily living? Patient refused        Food Insecurity    Within the past 12 months, you worried that your food would run out before you got the money to buy more. Patient refused     Within the past 12 months, the food you bought just didn't last and you didn't have money to get more. Patient refused        Stress    Do you feel stress - tense, restless, nervous, or  anxious, or unable to sleep at night because your mind is troubled all the time - these days? Patient refused        Social Connections    In a typical week, how many times do you talk on the phone with family, friends, or neighbors? Patient refused     How often do you get together with friends or relatives? Patient refused     How often do you attend Catholic or Advent services? Patient refused     Do you belong to any clubs or organizations such as Catholic groups, unions, fraternal or athletic groups, or school groups? Patient refused     How often do you attend meetings of the clubs or organizations you belong to? Patient refused        Alcohol Use    Q1: How often do you have a drink containing alcohol? Patient refused     Q2: How many drinks containing alcohol do you have on a typical day when you are drinking? Patient refused     Q3: How often do you have six or more drinks on one occasion? Patient refused                   This SW met with patient at bedside to complete DPA. Questions answered / contact numbers provided.  Use PREFERRED PHARMACY / BEDSIDE DELIVERY for any necessary medications at time of discharge. The patient is independent with all ADLs - does not use DME, In-home equipment, is not on HD, Coumadin or home oxygen.The patient's  will be assisting with help upon discharge. The patient's   will be providing transportation home. Hospital follow up will be scheduled with PCP. Will continue to follow for course of hospitalization.     Gordon Mendez LMSW  Case Management [facility]  Ext: [phone]

## 2023-06-21 NOTE — PLAN OF CARE
3:25 PM   The SW met with the patient at bedside to follow-up regarding her home Dobutamin. Sandra with [facility] [phone] contacted the SW while she was in the room with the patient. The patient informed the SW and Sandra that she has her home pump of Dobutamin. The patient reported that her pump is good until Thursday.     3:29 PM  The SW notified the patient's bedside nurse to contact Sandra with [facility] at [phone]  when closer to d/c in order for Sandra to come and connect the patient to her home pump via secure chat.     3:31 PM    The SW tried contacted Dr. Cortez office to schedule the patient a follow-up appt but the representative stated that she sent a message to the MD's nurse regarding scheduling a follow-up appt. The representative stated that the nurse will contact the patient directly regarding her hospital follow-up appt date and time.     4:16 PM  Sandra with [facility] contacted the SW and informed her that she was coming to the hospital now to connect the patient to her home pump. The SW notified the patient's bedside nurse by secure chat.     The SW will continue to follow.     Gordon Mendez LMSW  Case Management [facility]  Ext: [phone]

## 2023-06-21 NOTE — CONSULTS
[facility] - Transplant Step[facility]  Cardiology  Consult Note    Patient Name: Hafsa Hawley  MRN: [id number]  Admission Date: 6/20/2023  Hospital Length of Stay: 0 days  Code Status: Full Code   Attending Provider: Krysta Hansen MD   Consulting Provider: Wesley Cotton MD  Primary Care Physician: Cristobal Ann MD  Principal Problem:Acute kidney injury superimposed on chronic kidney disease    Patient information was obtained from patient, past medical records and ER records.     Inpatient consult to Cardiology  Consult performed by: Wesley Cotton MD  Consult ordered by: Asif Henderson MD  Reason for consult: management of advanced heart failure on palliative   Assessment/Recommendations: Please see A&P        Subjective:     Chief Complaint:  Acute on chronic heart failure management     HPI:   Patient is a 57 year old female with history of HFrEF (LVEF15%) on dobutamine & s/p AICD, CKD 4, AF (reportedly permanent) on amiodarone & apixaban, chronic resp failure previously on home O2, h/o PCC, DM2, HTN, HLD, MDD; p/w abnormal renal function labs drawn on 06/19 in [facility] f/u clinic. Reports some abdo & flank pain x1 wk, in s/o chronic nausea/vomiting which she reports is her b/l.  Reports that dobutamine pump has been working for her.  Patient was being followed in [facility] clinic however on 05/29/2023, patient was discharged from the [facility] outpatient clinic. She had an outpatient Cardiology visit on 06/19/2023 recommending to hold diuresis. Bmp showed elevated Cr and thus the patient was instructed pt to present to ED for evaluation of WASHINGTON. Denies significant shortness of breath at this time. On room air, resting comfortably in bed. BNP was low in the 300s but patient also taken Entresto at home (per med list). Patient does not recall many medications from her home list except for diuretic.          Past Medical History:   Diagnosis Date    Anemia     Arthritis     Atrial fibrillation     OAC    Chronic respiratory  failure with hypoxia, on home oxygen therapy     2L with activity, off at rest.  Per Pulm  no overt evidence of ILD or COPD on PFTs and CT to explain O2 needs.    CKD (chronic kidney disease), stage IV 05/08/2018    Congestive heart failure     s/p AICD placement,    Deep vein thrombosis     Depression     elevated bilirubin d/t Gilbert's syndrome     confirmed by [facility] genetic testing, evaluated by hepatology    Encounter for blood transfusion     GERD (gastroesophageal reflux disease)     Hypertension     Pheochromocytoma, malignant     Right kidney mass     Sleep apnea     Thalassemia trait, alpha     Thyroid disease     Type 2 diabetes mellitus with hyperglycemia, without long-term current use of insulin 08/13/2020       Past Surgical History:   Procedure Laterality Date    APPENDECTOMY      BONE MARROW BIOPSY      CARDIAC DEFIBRILLATOR PLACEMENT Left 12/2016    CARDIAC ELECTROPHYSIOLOGY MAPPING AND ABLATION      CARDIAC ELECTROPHYSIOLOGY MAPPING AND ABLATION      COLONOSCOPY N/A 5/6/2022    Procedure: COLONOSCOPY;  Surgeon: Arely Betancourt MD;  Location: [facility] ([address] [place] [address]);  Service: Endoscopy;  Laterality: N/A;  heart transplant candidate/ EF 25% - 2nd floor/ defib - Biotronik - ERW  Eliquis - per Dr. Cortez with [facility], Pt ok to hold Eliquis x 2 days prior-see media tab-outside correspondence dated 12/30/21  - ER[facility]  verbal instructions/portal instructions/email instructions - s    EYE SURGERY      due to running tears    FRACTURE SURGERY Left     hand 5th digit    HYSTERECTOMY      KNEE SURGERY Left 2016    hematoma    LIVER BIOPSY  10/24/2018    Minimal steatosis, predominantly macrovesicular, 1%, Minimal nonspecific portal inflammation, no fibrosis. No findings on biopsy to explain elevated bilirubin levels. Could be d/t Gilbert's =?- hemolysis    RIGHT HEART CATHETERIZATION Right 12/07/2021    Procedure: INSERTION, CATHETER, RIGHT HEART;  Surgeon: Irving Cardenas MD;  Location:  "[facility] CATH LAB;  Service: Cardiology;  Laterality: Right;    RIGHT HEART CATHETERIZATION Right 12/19/2022    Procedure: INSERTION, CATHETER, RIGHT HEART;  Surgeon: Burke Camilo MD;  Location: [facility] CATH LAB;  Service: Cardiology;  Laterality: Right;    RIGHT HEART CATHETERIZATION Right 3/29/2023    Procedure: INSERTION, CATHETER, RIGHT HEART;  Surgeon: Katie Liriano DO;  Location: [facility] CATH LAB;  Service: Cardiology;  Laterality: Right;    TRANSJUGULAR BIOPSY OF LIVER N/A 10/24/2018    Procedure: BIOPSY, LIVER, TRANSJUGULAR APPROACH;  Surgeon: [facility] Provider;  Location: [facility] OR [address] [place] [address];  Service: Radiology;  Laterality: N/A;       Review of patient's allergies indicates:   Allergen Reactions    Penicillins Hives and Other (See Comments)    Iodinated contrast media Nausea And Vomiting    Oxycodone-acetaminophen Other (See Comments)     Nausea, Dizziness, Anxiety.  "I don't like how it makes me feel."   Given Hydromorphone 0.5mg IVP  Without problems.  Other reaction(s): Other (See Comments)    Clonazepam Other (See Comments)    Diovan hct [valsartan-hydrochlorothiazide] Other (See Comments)     Causes coughing    Iodine Other (See Comments)    Irinotecan      Pt has homozygosity for the TA7 promoter variant that places this individual at significantly increased risk for   severe neutropenia (grade 4) when treated with the standard dose of irinotecan (risk approximately 50%).   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 Metabolism of other drugs not listed here may also be impacted by UGT1A1 enzyme activity.       Tramadol Nausea And Vomiting and Other (See Comments)     Other reaction(s): Other (See Comments)    Valsartan Other (See Comments)       Current Facility-Administered Medications on File Prior to Encounter   Medication    0.9%  NaCl infusion    " vancomycin in dextrose 5 % 1 gram/250 mL IVPB 1,000 mg     Current Outpatient Medications on File Prior to Encounter   Medication Sig    albuterol-ipratropium (DUO-NEB) 2.5 mg-0.5 mg/3 mL nebulizer solution Take 3 mLs by nebulization 2 (two) times a day.    allopurinoL (ZYLOPRIM) 100 MG tablet Take 1 tablet (100 mg total) by mouth daily as needed (gout attack).    ALPRAZolam (XANAX) 2 MG Tab ALPRAZolam 2 mg tablet, [RxNorm: 973686]    amiodarone (PACERONE) 200 MG Tab Take 200 mg by mouth.    atorvastatin (LIPITOR) 40 MG tablet Take 1 tablet (40 mg total) by mouth every evening.    b complex vitamins tablet Take 1 tablet by mouth once daily.    blood sugar diagnostic (ACCU-CHEK GUIDE TEST STRIPS) Strp 1 strip by Other route 3 (three) times daily.    bumetanide (BUMEX) 2 MG tablet Take 2mg in the morning and 1mg in the evening    cetirizine (ZYRTEC) 10 MG tablet Take 10 mg by mouth daily as needed for Allergies.    diclofenac sodium (VOLTAREN) 1 % Gel APPLY 2 GRAMS TOPICALLY 3 (THREE) TIMES DAILY AS NEEDED (PAIN).    dobutamine HCl (DOBUTAMINE IV) Inject into the vein.    ELIQUIS 5 mg Tab TAKE 1 TABLET (5 MG TOTAL) BY MOUTH 2 (TWO) TIMES DAILY.    empagliflozin (JARDIANCE) 10 mg tablet Take 1 tablet (10 mg total) by mouth once daily.    ergocalciferol (ERGOCALCIFEROL) 50,000 unit Cap Take 1 capsule (50,000 Units total) by mouth every Saturday.    EScitalopram oxalate (LEXAPRO) 10 MG tablet Take 1 tablet (10 mg total) by mouth once daily.    ferrous sulfate (FEOSOL) 325 mg (65 mg iron) Tab tablet TAKE 1 TABLET ORALLY 2 TIMES A DAY AFTER FOOD.    fluticasone propionate (FLONASE) 50 mcg/actuation nasal spray 2 sprays by Each Nostril route daily as needed for Rhinitis.     HYDROcodone-acetaminophen (NORCO) 7.5-325 mg per tablet Take 1 tablet by mouth every 8 (eight) hours as needed for Pain.    isosorbide-hydrALAZINE 20-37.5 mg (BIDIL) 20-37.5 mg Tab Take 1 tablet by mouth 3 (three) times daily.     lancets (ACCU-CHEK SOFTCLIX LANCETS) Misc 1 Device by Misc.(Non-Drug; Combo Route) route 3 (three) times daily.    LIDOcaine (LIDODERM) 5 % Place 1 patch onto the skin daily as needed (pain). Remove & Discard patch within 12 hours or as directed by MD (Patient not taking: Reported on 6/19/2023)    LIDOcaine-prilocaine 5-2.5-2.5 % PaCr Apply 1 patch topically once daily. (Patient not taking: Reported on 6/19/2023)    mometasone-formoterol (DULERA) 200-5 mcg/actuation inhaler Inhale 2 puffs into the lungs 2 (two) times daily. Controller    nitroGLYCERIN (NITROSTAT) 0.4 MG SL tablet Nitrostat 0.4 mg sublingual tablet, [RxNorm: 876457]    ondansetron (ZOFRAN) 4 MG tablet ondansetron HCL 4 mg tablet, [RxNorm: 130614]    ondansetron (ZOFRAN-ODT) 8 MG TbDL Take 1 tablet (8 mg total) by mouth every 12 (twelve) hours as needed (nausea).    pantoprazole (PROTONIX) 40 MG tablet TAKE 1 TABLET BY MOUTH DAILY IN THE MORNING    pregabalin (LYRICA) 50 MG capsule Take 1 capsule (50 mg total) by mouth 2 (two) times daily.    promethazine-codeine 6.25-10 mg/5 ml (PHENERGAN WITH CODEINE) 6.25-10 mg/5 mL syrup promethazine 6.25 mg-codeine 10 mg/5 mL syrup, [RxNorm: 839814]    promethazine-codeine 6.25-10 mg/5 ml (PHENERGAN WITH CODEINE) 6.25-10 mg/5 mL syrup TAKE 5 ML BY MOUTH EVERY 4-6 HOURS    rOPINIRole (REQUIP) 4 MG tablet TAKE 1 TABLET (4 MG TOTAL) BY MOUTH ONCE DAILY. PT TAKING 4MG DAILY    sacubitriL-valsartan (ENTRESTO) 49-51 mg per tablet Take 1 tablet by mouth 2 (two) times daily.    semaglutide 0.25 mg or 0.5 mg (2 mg/3 mL) PnIj Inject 0.5 mg into the skin every 7 days.    SPIRIVA WITH HANDIHALER 18 mcg inhalation capsule Inhale 1 capsule (18 mcg total) into the lungs once daily.    traZODone (DESYREL) 50 MG tablet Take 25 mg by mouth nightly as needed.    VENTOLIN HFA 90 mcg/actuation inhaler Inhale 2 puffs into the lungs every 6 (six) hours as needed for Shortness of Breath or Wheezing.     Family History        Problem Relation (Age of Onset)    Cancer Mother    Cirrhosis Brother    Diabetes Brother    Heart attack Father    Heart disease Father, Sister, Brother, Sister, Brother    Hypertension Father, Brother          Tobacco Use    Smoking status: Never    Smokeless tobacco: Never   Substance and Sexual Activity    Alcohol use: Yes     Comment: up to 1 yr ago drank 2-3 drinks on occasion but sporadic    Drug use: No    Sexual activity: Yes     Partners: Male     Review of Systems   Constitutional: Negative for chills and fever.   Cardiovascular:  Negative for chest pain, orthopnea and palpitations.   Respiratory:  Negative for cough and shortness of breath.    Gastrointestinal:  Negative for abdominal pain.   Neurological:  Negative for dizziness, headaches and light-headedness.   Psychiatric/Behavioral:  Negative for altered mental status.    Objective:     Vital Signs (Most Recent):  Temp: 98.6 °F (37 °C) (06/21/23 0800)  Pulse: 78 (06/21/23 1000)  Resp: 19 (06/21/23 0800)  BP: (!) 143/79 (06/21/23 0800)  SpO2: (!) 94 % (06/21/23 0800) Vital Signs (24h Range):  Temp:  [98.2 °F (36.8 °C)-98.6 °F (37 °C)] 98.6 °F (37 °C)  Pulse:  [66-88] 78  Resp:  [16-19] 19  SpO2:  [94 %-97 %] 94 %  BP: (122-153)/(69-79) 143/79     Weight: 85 kg (187 lb 6.3 oz)  Body mass index is 30.25 kg/m².    SpO2: (!) 94 %         Intake/Output Summary (Last 24 hours) at 6/21/2023 1101  Last data filed at 6/21/2023 0612  Gross per 24 hour   Intake 150 ml   Output --   Net 150 ml       Lines/Drains/Airways       Peripherally Inserted Central Catheter Line  Duration             PICC Double Lumen 05/12/23 1534 right brachial 39 days              Peripheral Intravenous Line  Duration                  Peripheral IV - Single Lumen 06/20/23 2045 20 G Anterior;Left Forearm <1 day                     Physical Exam  Vitals reviewed.   Constitutional:       General: She is not in acute distress.     Appearance: Normal appearance. She is obese. She  is not toxic-appearing.   HENT:      Head: Normocephalic and atraumatic.      Mouth/Throat:      Mouth: Mucous membranes are moist.   Cardiovascular:      Rate and Rhythm: Normal rate and regular rhythm.      Heart sounds: No murmur heard.    No friction rub. No gallop.   Pulmonary:      Effort: Pulmonary effort is normal. No respiratory distress.      Breath sounds: Normal breath sounds.      Comments: On room air  Abdominal:      Palpations: Abdomen is soft.   Musculoskeletal:      Right lower leg: No edema.      Left lower leg: No edema.      Comments: Thin legs  PICC line to arm   Skin:     General: Skin is warm.   Neurological:      Mental Status: She is alert and oriented to person, place, and time. Mental status is at baseline.        Significant Labs: BMP:   Recent Labs   Lab 06/19/23 1320 06/20/23 2024 06/21/23  0402   GLU 87 83 113*    144 142   K 3.9 3.7 3.8    106 106   CO2 25 26 26   BUN 48* 39* 39*   CREATININE 2.7* 2.5* 2.4*   CALCIUM 9.7 9.7 9.3   MG  --   --  1.7   , CMP   Recent Labs   Lab 06/19/23  1320 06/20/23 2024 06/21/23  0402    144 142   K 3.9 3.7 3.8    106 106   CO2 25 26 26   GLU 87 83 113*   BUN 48* 39* 39*   CREATININE 2.7* 2.5* 2.4*   CALCIUM 9.7 9.7 9.3   PROT 6.8 7.3 6.5   ALBUMIN 3.9 4.0 3.5   BILITOT 1.8* 1.9* 1.3*   ALKPHOS 50* 60 52*   AST 22 22 27   ALT 13 13 12   ANIONGAP 12 12 10   , CBC   Recent Labs   Lab 06/19/23  1320 06/20/23 2024 06/21/23  0402   WBC 3.77* 3.94 3.49*   HGB 9.1* 9.7* 8.2*   HCT 31.8* 32.5* 27.3*    181 142*   , INR No results for input(s): INR, PROTIME in the last 48 hours., Troponin No results for input(s): TROPONINI in the last 48 hours., and All pertinent lab results from the last 24 hours have been reviewed.    Significant Imaging: Echocardiogram: Transthoracic echo (TTE) complete (Cupid Only):   Results for orders placed or performed during the hospital encounter of 03/26/23   Echo   Result Value Ref Range    BSA  "1.99 m2    TDI SEPTAL 0.05 m/s    LV LATERAL E/E' RATIO 15.00 m/s    LV SEPTAL E/E' RATIO 15.00 m/s    LA WIDTH 4.35 cm    TDI LATERAL 0.05 m/s    LVIDd 7.07 (A) 3.5 - 6.0 cm    IVS 1.70 (A) 0.6 - 1.1 cm    Posterior Wall 1.30 0.6 - 1.1 cm    LVIDs 6.10 (A) 2.1 - 4.0 cm    FS 14 28 - 44 %    LA volume 166.06 cm3    Sinus 2.74 cm    STJ 3.31 cm    Ascending aorta 3.19 cm    LV mass 556.17 g    LA size 6.64 cm    RVDD 3.58 cm    TAPSE 1.50 cm    RV S' 9 cm/s    Left Ventricle Relative Wall Thickness 0.37 cm    AV mean gradient 5 mmHg    AV valve area 2.14 cm2    AV Velocity Ratio 0.64     AV index (prosthetic) 0.56     MV valve area p 1/2 method 3.95 cm2    E/A ratio 1.07     Mean e' 0.05 m/s    E wave deceleration time 192.14 msec    MV "A" wave duration 7.14 msec    Pulm vein S/D ratio 1.42     LVOT diameter 2.20 cm    LVOT area 3.8 cm2    LVOT peak marcos 0.89 m/s    LVOT peak VTI 12.67 cm    Ao peak marcos 1.40 m/s    Ao VTI 22.54 cm    LVOT stroke volume 48.14 cm3    AV peak gradient 8 mmHg    E/E' ratio 15.00 m/s    MV Peak E Marcos 0.75 m/s    TR Max Marcos 3.28 m/s    MV stenosis pressure 1/2 time 55.72 ms    MV Peak A Marcos 0.70 m/s    PV Peak S Marcos 0.54 m/s    PV Peak D Marcos 0.38 m/s    LV Systolic Volume 202.54 mL    LV Systolic Volume Index 103.9 mL/m2    LV Diastolic Volume 261.54 mL    LV Diastolic Volume Index 134.12 mL/m2    LA Volume Index 85.2 mL/m2    LV Mass Index 285 g/m2    RA Major Axis 5.91 cm    Left Atrium Minor Axis 6.86 cm    Left Atrium Major Axis 6.67 cm    Triscuspid Valve Regurgitation Peak Gradient 43 mmHg    LA Volume Index (Mod) 40.1 mL/m2    LA volume (mod) 78.16 cm3    RA Width 4.20 cm    Right Atrial Pressure (from IVC) 8 mmHg    EF 15 %    TV rest pulmonary artery pressure 51 mmHg    Narrative    · The left ventricle is severely enlarged with eccentric hypertrophy and   severely decreased systolic function. The estimated ejection fraction is   15%.  · Normal right ventricular size with " moderately reduced right ventricular   systolic function.  · Grade II left ventricular diastolic dysfunction.  · Biatrial enlargement.  · Mild mitral regurgitation.  · Small pericardial effusion.  · The estimated PA systolic pressure is 51 mmHg (by tricuspid   regurgitation velocity). The estimated mean PA pressure is 39mm Hg.  · Intermediate central venous pressure (8 mmHg).        Assessment and Plan:     * Acute kidney injury superimposed on chronic kidney disease  Patient with underlying history of NICM Stage D HFrEF (LVEF 15%) s/p Biotronik ICD on palliative  2.5 mcg/kg/min, CKD 4 that presents with WASHINGTON. Diuretics held by primary with improvement in Cr. No CXR available nor NT-Pro-BNP available for review. Patient is on room air. Physical exam does not show JVD.  drip infusing through PICC line.   Patient appears to be euvolemic at this time and would not require further diuresis. Would restart the patient's home GDMT with Entresto 24/26 mg PO BID + can restart outpatient Jardiance 10 mg PO daily (eGFR > 20). This patient is no longer a candidate for advanced options. She is currently on palliative dobutamine at 2.5 mcg/kg/min. Patient should have either inpatient or outpatient consultation for palliative care. Additionally, as tolerated, the patient should start spironolactone. BB not indicated at this time as patient is on . Patient may benefit from maintenance diuresis at a lower dose from her current home dose as she presented with hypovolemia and WASHINGTON. Would recommend decreasing Bumex to 2 mg PO once daily. Can also continue Bidil on discharge 37.5-20 mg PO TID (if cost prohibitive, please order individual prescriptions of hydralazine 25 mg PO TID + isosorbide dinitrate 10 mg PO TID).  The patient will need to follow up with Dr Fabio Cortez ([facility]). I spoke with him over the phone today and he feels comfortable managing the patient while on dobutamine.      NSVT (nonsustained ventricular  tachycardia)  Per last interrogation of device on 06/07/2023, lasted 5 seconds  Continue current therapies with amiodarone   Biotronik ICD in place , followed by Dr MARCI Pretty    Chronic respiratory failure with hypoxia and hypercapnia  Improved    Type 2 diabetes mellitus with stage 4 chronic kidney disease, with long-term current use of insulin  As managed by primary    ICD (implantable cardioverter-defibrillator) in place  Biotronik ICD   Managed by EP Dr MARCI Pretty    Chronic combined systolic and diastolic heart failure  As noted above    NICM (nonischemic cardiomyopathy)  See heart failure    Permanent atrial fibrillation  Rate controlled, in NSR with 1* AV block on presentation ECG 06/21/2023 (independently reviewed)  Continue amiodarone and AC    Essential hypertension  As noted above, tx HF with GDMT, goal BP 90/60 mm Hg      VTE Risk Mitigation (From admission, onward)         Ordered     apixaban tablet 5 mg  2 times daily         06/21/23 0831     IP VTE HIGH RISK PATIENT  Once         06/21/23 0036     Place sequential compression device  Until discontinued         06/21/23 0036            Discussed with staff.    Thank you for your consult. We will sign off. Please contact us if you have any additional questions.    Wesley Cotton MD  Cardiology PGY5  [facility] - Trans[facility]

## 2023-06-21 NOTE — ASSESSMENT & PLAN NOTE
Patient's FSGs are controlled on current medication regimen.  Last A1c reviewed-   Lab Results   Component Value Date    HGBA1C 5.9 (H) 05/11/2023     Most recent fingerstick glucose reviewed- No results for input(s): POCTGLUCOSE in the last 24 hours.  Current correctional scale  Low  Maintain anti-hyperglycemic dose as follows-   Antihyperglycemics (From admission, onward)    Start     Stop Route Frequency Ordered    06/21/23 0134  insulin aspart U-100 pen 0-5 Units         -- SubQ Before meals & nightly PRN 06/21/23 0036        - LDSSI; titrate p.r.n. goal Glc 140-180  - POCT Glc a.c. & q.h.s.  - diet diabetic  - hold PO antiGlcs

## 2023-06-21 NOTE — ED PROVIDER NOTES
"Encounter Date: 6/20/2023       History     Chief Complaint   Patient presents with    Abnormal Lab     On dobutamine pump, got call from nurse elevated kidney function     HPI  Hafsa is a 57 y.o. F with PMH of Anemia, Atrial fibrillation, CKD (chronic kidney disease), stage IV (05/08/2018), Congestive heart failure on dobutamine pump, DVT, depression, htn, pheochromocytoma, DM who presents with WASHINGTON.  She had labs drawn 2 days ago and was called telling her that she had abnormal kidney labs and that she need to go to the ED. she has been having vomiting a couple times daily which she states is her baseline.  She has also been having about a week of some abdominal pain and flank pains as well as some pain in her legs.  She held her last 2 doses of Bumex per cardiology recs because of the WASHINGTON.  She denies chest pain or trouble breathing at this time.  She has not been having orthopnea.  She states that her dobutamine pump has been working appropriately.  Review of patient's allergies indicates:   Allergen Reactions    Penicillins Hives and Other (See Comments)    Iodinated contrast media Nausea And Vomiting    Oxycodone-acetaminophen Other (See Comments)     Nausea, Dizziness, Anxiety.  "I don't like how it makes me feel."   Given Hydromorphone 0.5mg IVP  Without problems.  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 Metabolism of other drugs not listed here may also be " impacted by UGT1A1 enzyme activity.       Tramadol Nausea And Vomiting and Other (See Comments)     Other reaction(s): Other (See Comments)    Valsartan Other (See Comments)     Past Medical History:   Diagnosis Date    Anemia     Arthritis     Atrial fibrillation     OAC    Chronic respiratory failure with hypoxia, on home oxygen therapy     2L with activity, off at rest.  Per Pulm  no overt evidence of ILD or COPD on PFTs and CT to explain O2 needs.    CKD (chronic kidney disease), stage IV 05/08/2018    Congestive heart failure     s/p AICD placement,    Deep vein thrombosis     Depression     elevated bilirubin d/t Gilbert's syndrome     confirmed by [facility] genetic testing, evaluated by hepatology    Encounter for blood transfusion     GERD (gastroesophageal reflux disease)     Hypertension     Pheochromocytoma, malignant     Right kidney mass     Sleep apnea     Thalassemia trait, alpha     Thyroid disease     Type 2 diabetes mellitus with hyperglycemia, without long-term current use of insulin 08/13/2020     Past Surgical History:   Procedure Laterality Date    APPENDECTOMY      BONE MARROW BIOPSY      CARDIAC DEFIBRILLATOR PLACEMENT Left 12/2016    CARDIAC ELECTROPHYSIOLOGY MAPPING AND ABLATION      CARDIAC ELECTROPHYSIOLOGY MAPPING AND ABLATION      COLONOSCOPY N/A 5/6/2022    Procedure: COLONOSCOPY;  Surgeon: Arely Betancourt MD;  Location: [facility] ([address] [place] [address]);  Service: Endoscopy;  Laterality: N/A;  heart transplant candidate/ EF 25% - 2nd floor/ defib - Biotronik - ERW  Eliquis - per Dr. Cortez with [facility], Pt ok to hold Eliquis x 2 days prior-see media tab-outside correspondence dated 12/30/21  - [facility]  verbal instructions/portal instructions/email instructions - s    EYE SURGERY      due to running tears    FRACTURE SURGERY Left     hand 5th digit    HYSTERECTOMY      KNEE SURGERY Left 2016    hematoma    LIVER BIOPSY  10/24/2018    Minimal steatosis, predominantly macrovesicular, 1%, Minimal  nonspecific portal inflammation, no fibrosis. No findings on biopsy to explain elevated bilirubin levels. Could be d/t Gilbert's =?- hemolysis    RIGHT HEART CATHETERIZATION Right 2021    Procedure: INSERTION, CATHETER, RIGHT HEART;  Surgeon: Irving Cardenas MD;  Location: [facility] CATH LAB;  Service: Cardiology;  Laterality: Right;    RIGHT HEART CATHETERIZATION Right 2022    Procedure: INSERTION, CATHETER, RIGHT HEART;  Surgeon: Burke Camilo MD;  Location: [facility] CATH LAB;  Service: Cardiology;  Laterality: Right;    RIGHT HEART CATHETERIZATION Right 3/29/2023    Procedure: INSERTION, CATHETER, RIGHT HEART;  Surgeon: Katie Liriano DO;  Location: [facility] CATH LAB;  Service: Cardiology;  Laterality: Right;    TRANSJUGULAR BIOPSY OF LIVER N/A 10/24/2018    Procedure: BIOPSY, LIVER, TRANSJUGULAR APPROACH;  Surgeon: Carmen Diagnostic Provider;  Location: [facility] OR [address] [place] [address];  Service: Radiology;  Laterality: N/A;     Family History   Problem Relation Age of Onset    Cancer Mother         pancreatic CA early 50's    Heart disease Father          MI in late 50's    Hypertension Father     Heart attack Father     Heart disease Sister     Heart disease Brother     Cirrhosis Brother         alcoholic    Heart disease Sister     Heart disease Brother     Hypertension Brother     Diabetes Brother      Social History     Tobacco Use    Smoking status: Never    Smokeless tobacco: Never   Substance Use Topics    Alcohol use: Yes     Comment: up to 1 yr ago drank 2-3 drinks on occasion but sporadic    Drug use: No     Review of Systems    Physical Exam     Initial Vitals [23 1800]   BP Pulse Resp Temp SpO2   123/69 88 18 98.2 °F (36.8 °C) 96 %      MAP       --         Physical Exam  General: Awake and alert, well-nourished  HENT: moist mucous membranes  Eyes: No conjunctival injection  Pulm: CTAB, no increased work of breathing  CV: Regular rate and rhythm, no murmur noted  Abdomen: Nondistended, mild  abdominal discomfort on palpation but no area of significant focal tenderness  MSK: No LE edema  Skin: No rash noted, R arm picc line site c/d/i  Neuro: No facial asymmetry, grossly normal movements of arms and legs  Psychiatric: Cooperative    ED Course   Procedures  Labs Reviewed   CBC W/ AUTO DIFFERENTIAL - Abnormal; Notable for the following components:       Result Value    RBC 5.47 (*)     Hemoglobin 9.7 (*)     Hematocrit 32.5 (*)     MCV 59 (*)     MCH 17.7 (*)     MCHC 29.8 (*)     RDW 26.0 (*)     Lymph # 0.9 (*)     nRBC 1 (*)     All other components within normal limits    Narrative:     Release to patient->Immediate   COMPREHENSIVE METABOLIC PANEL - Abnormal; Notable for the following components:    BUN 39 (*)     Creatinine 2.5 (*)     Total Bilirubin 1.9 (*)     eGFR 21.9 (*)     All other components within normal limits    Narrative:     Release to patient->Immediate   URINALYSIS, REFLEX TO URINE CULTURE - Abnormal; Notable for the following components:    Protein, UA 2+ (*)     Glucose, UA 4+ (*)     Occult Blood UA 1+ (*)     Leukocytes, UA Trace (*)     All other components within normal limits    Narrative:     Specimen Source->Urine   URINALYSIS MICROSCOPIC - Abnormal; Notable for the following components:    WBC, UA 22 (*)     Bacteria Few (*)     All other components within normal limits    Narrative:     Specimen Source->Urine   PROTEIN / CREATININE RATIO, URINE - Abnormal; Notable for the following components:    Protein, Urine Random 94 (*)     Prot/Creat Ratio, Urine 0.73 (*)     All other components within normal limits    Narrative:     Specimen Source->Urine   CBC W/ AUTO DIFFERENTIAL - Abnormal; Notable for the following components:    WBC 3.49 (*)     Hemoglobin 8.2 (*)     Hematocrit 27.3 (*)     MCV 59 (*)     MCH 17.7 (*)     MCHC 30.0 (*)     RDW 25.6 (*)     Platelets 142 (*)     Lymph # 0.9 (*)     nRBC 1 (*)     All other components within normal limits   HEPATITIS C ANTIBODY     Narrative:     Release to patient->Immediate   SARS-COV-2 RNA AMPLIFICATION, QUAL   CREATININE, URINE, RANDOM    Narrative:     Specimen Source->Urine   SODIUM, URINE, RANDOM    Narrative:     Specimen Source->Urine        ECG Results              EKG 12-lead (Final result)  Result time 06/21/23 11:03:44      Final result by Interface, Lab In [facility] (06/21/23 11:03:44)                   Narrative:    Test Reason : I50.9,    Vent. Rate : 073 BPM     Atrial Rate : 073 BPM     P-R Int : 220 ms          QRS Dur : 120 ms      QT Int : 466 ms       P-R-T Axes : 012 -50 072 degrees     QTc Int : 513 ms    Sinus rhythm with 1st degree A-V block  Intraventricular conduction defect  Left anterior fascicular block  Nonspecific T wave abnormality  Prolonged QT interval  Abnormal ECG  When compared with ECG of 11-MAY-2023 18:06,  Left anterior fascicular block is now Present  Nonspecific T wave abnormality no longer evident in Inferior leads  Inverted T waves have replaced nonspecific T wave abnormality in Lateral  leads  Confirmed by PATRICIA JJ MD ([id number]) on 6/21/2023 11:03:36 AM    Referred By: [email or web address]   SELF           Confirmed By:PATRICIA JJ MD                                  Imaging Results              CT Abdomen Pelvis  Without Contrast (Final result)  Result time 06/20/23 23:36:58      Final result by Varinder Calvin MD (06/20/23 23:36:58)                   Impression:      There is no evidence for acute inflammatory or obstructive process of the abdomen or pelvis.    Small pericardial effusion again noted.    Right adrenal lesion appears stable compared to prior examinations, as discussed above.    Right renal lesion appears stable when compared to prior examinations, as discussed above.    Diverticula of the colon are noted, there is no evidence for acute diverticulitis.    Additional findings as above.      Electronically signed by: Varinder Calvin  Date:    06/20/2023  Time:    23:36                Narrative:    EXAMINATION:  CT ABDOMEN PELVIS WITHOUT CONTRAST    CLINICAL HISTORY:  Abdominal pain, acute, nonlocalized;    TECHNIQUE:  Low dose axial images, sagittal and coronal reformations were obtained from the lung bases to the pubic symphysis.  Intravenous contrast and oral contrast was not utilized.  This diminishes the sensitivity of the examination.    COMPARISON:  Prior examinations, the most recent of which is February 16, 2023    FINDINGS:  There appear to be pacemaker leads present, the heart size appears enlarged.  There is a small pericardial effusion again noted.  The lung bases demonstrate mild motion artifact and mild atelectatic change.    The stomach is incompletely distended, nonspecific appearance of ingested material and air within the gastric lumen.  The gallbladder surgically absent.  When accounting for limitations of the exam, there is no evidence for acute process of the liver, pancreas, spleen or left adrenal gland.  There is an approximately 4 cm right adrenal lesion again noted appearing stable when compared to prior examinations dating back to June 2018.    There is a duplicated collecting system of the right kidney.  There is no evidence for hydronephrosis or obstructive uropathy or ureteral calculus bilaterally.  Extra ureteral calcifications consistent with vascular calcifications and phleboliths are noted.  There is no evidence for perinephric inflammatory process or hydronephrosis bilaterally.  Small hyperdense lesion at the mid to lower pole of the right kidney again noted, appears stable when compared to prior examinations dating back to June 2018.    The abdominal aorta appears normal in caliber, atherosclerotic change noted, otherwise not optimally evaluated on this noncontrast examination.  The urinary bladder appears unremarkable for degree of distention.  The patient appears status post hysterectomy.    There are postoperative changes of the anterior abdominopelvic wall  noted.  There is no evidence for small bowel obstructive process.  The appendix is not identified.  There is mild prominence of the right colon with air and stool without inflammatory or obstructive pattern.  Diverticula of the colon are noted, there is no evidence for acute diverticulitis.  There is no evidence for free intraperitoneal air.    The visualized osseous structures demonstrate chronic change.                                       Medications   sodium chloride 0.9% bolus 250 mL 250 mL (0 mLs Intravenous Stopped 6/20/23 2337)   cefTRIAXone (ROCEPHIN) 1 g in dextrose 5 % in water (D5W) 5 % 100 mL IVPB (MB+) (0 g Intravenous Stopped 6/21/23 0040)   magnesium sulfate 2g in water 50mL IVPB (premix) ( Intravenous Canceled Entry 6/21/23 5244)   potassium chloride SA CR tablet 20 mEq (20 mEq Oral Given 6/21/23 0548)   promethazine tablet 12.5 mg (12.5 mg Oral Given 6/21/23 1148)     Medical Decision Making:   Differential Diagnosis:   WASHINGTON, dehydration, medication side effect, cardiorenal syndrome,  intra-abdominal infection, SBO, UTI, pyelonephritis, aortic pathology less likely  ED Management:  Pt well appearing at time of eval.  Hemodynamically stable, no respiratory distress, does not look volume overloaded on exam.  Mild abdominal discomfort on exam.  CT abdomen without acute findings.  CBC with moderate anemia similar to baseline, no leukocytosis.  She does have WASHINGTON with Cr 2.5.  Given her recent vomiting may be slightly volume down and will fluid challenge gently with 250mL for now.  UA suggestive of possible UTI, will cover with ceftriaxone while waiting for culture.  Discussed with cardiology who recommends medicine admission.  Home dobutamine pump continued while in ED.  Pt admitted to medicine in stable condition, discussed case with medicine team.                        Clinical Impression:   Final diagnoses:  [N17.9] WASHINGTON (acute kidney injury)        ED Disposition Condition    Observation                  Adrian Dumont MD  06/22/23 7149

## 2023-06-21 NOTE — ASSESSMENT & PLAN NOTE
Patient with Hypercapnic and Hypoxic Respiratory failure which is Chronic.  she is not on home oxygen. Supplemental oxygen was provided and noted-       Signs/symptoms of respiratory failure include- respiratory distress. Contributing diagnoses includes - CHF, COPD and Interstitial lung disease Labs and images were reviewed. Patient Has not had a recent ABG. Will treat underlying causes and adjust management of respiratory failure as follows-     - CPAP q.h.s.  - DuoNebs p.r.n. wheezing, SOB

## 2023-06-22 NOTE — PLAN OF CARE
[facility] - Transplant Stepdown  Discharge Final Note    Primary Care Provider: Cristobal Ann MD    Expected Discharge Date: 6/21/2023    Final Discharge Note (most recent)       Final Note - 06/22/23 0859          Final Note    Assessment Type Final Discharge Note     Anticipated Discharge Disposition Home or Self Care        Post-Acute Status    Post-Acute Authorization Other     Other Status No Post-Acute Service Needs                     Important Message from Medicare Kinyada Foots, LMSW  Case Management [facility]  Ext: [phone]

## 2023-06-26 ENCOUNTER — INFUSION (OUTPATIENT)
Dept: INFUSION THERAPY | Facility: HOSPITAL | Age: 58
End: 2023-06-26
Attending: INTERNAL MEDICINE
Payer: MEDICAID

## 2023-06-26 ENCOUNTER — TELEPHONE (OUTPATIENT)
Dept: ENDOCRINOLOGY | Facility: CLINIC | Age: 58
End: 2023-06-26
Payer: MEDICAID

## 2023-06-26 VITALS
DIASTOLIC BLOOD PRESSURE: 82 MMHG | RESPIRATION RATE: 18 BRPM | TEMPERATURE: 98 F | SYSTOLIC BLOOD PRESSURE: 151 MMHG | HEART RATE: 86 BPM

## 2023-06-26 DIAGNOSIS — E11.22 TYPE 2 DIABETES MELLITUS WITH STAGE 4 CHRONIC KIDNEY DISEASE, WITHOUT LONG-TERM CURRENT USE OF INSULIN: ICD-10-CM

## 2023-06-26 DIAGNOSIS — I42.9 CARDIOMYOPATHY, UNSPECIFIED TYPE: Primary | ICD-10-CM

## 2023-06-26 DIAGNOSIS — N18.4 TYPE 2 DIABETES MELLITUS WITH STAGE 4 CHRONIC KIDNEY DISEASE, WITHOUT LONG-TERM CURRENT USE OF INSULIN: ICD-10-CM

## 2023-06-26 PROCEDURE — 36591 DRAW BLOOD OFF VENOUS DEVICE: CPT

## 2023-06-26 PROCEDURE — 63600175 PHARM REV CODE 636 W HCPCS: Performed by: INTERNAL MEDICINE

## 2023-06-26 RX ORDER — HEPARIN 100 UNIT/ML
5 SYRINGE INTRAVENOUS ONCE
Status: COMPLETED | OUTPATIENT
Start: 2023-06-26 | End: 2023-06-26

## 2023-06-26 RX ORDER — BLOOD-GLUCOSE SENSOR
1 EACH MISCELLANEOUS
Qty: 3 EACH | Refills: 11 | Status: SHIPPED | OUTPATIENT
Start: 2023-06-26 | End: 2023-12-05 | Stop reason: SDUPTHER

## 2023-06-26 RX ADMIN — HEPARIN 500 UNITS: 100 SYRINGE at 11:06

## 2023-06-26 NOTE — TELEPHONE ENCOUNTER
----- Message from Flor Evans sent at 2023  2:42 PM CDT -----  Contact: SELF  Hafsa Hawley  MRN: [id number]  : 1965  PCP: Bertha Lorenzo  Home Phone      [phone]  Work Phone      Not on file.  Mobile          [phone]  Home Phone      [phone]      MESSAGE: REFILL ON semaglutide 0.25 And also,G7 dexcom patches  [facility] and [facility] supply      PHONE [phone]

## 2023-06-26 NOTE — PROGRESS NOTES
1050  Right upper arm PICC line dsg changed using sterile technique  No signs of infection noted    Stat lock device, bio patch and tegaderm dressing reapplied.  Tolerated well

## 2023-06-27 ENCOUNTER — HOSPITAL ENCOUNTER (INPATIENT)
Facility: HOSPITAL | Age: 58
LOS: 3 days | Discharge: HOME | DRG: 280 | End: 2023-06-30
Attending: INTERNAL MEDICINE | Admitting: INTERNAL MEDICINE
Payer: MEDICAID

## 2023-06-27 ENCOUNTER — TELEPHONE (OUTPATIENT)
Dept: TRANSPLANT | Facility: CLINIC | Age: 58
End: 2023-06-27
Payer: MEDICAID

## 2023-06-27 ENCOUNTER — HOSPITAL ENCOUNTER (EMERGENCY)
Facility: HOSPITAL | Age: 58
Discharge: ANOTHER HOSPITAL | End: 2023-06-27
Attending: STUDENT IN AN ORGANIZED HEALTH CARE EDUCATION/TRAINING PROGRAM
Payer: MEDICAID

## 2023-06-27 VITALS
DIASTOLIC BLOOD PRESSURE: 78 MMHG | TEMPERATURE: 99 F | WEIGHT: 181.88 LBS | SYSTOLIC BLOOD PRESSURE: 130 MMHG | HEART RATE: 69 BPM | HEIGHT: 66 IN | RESPIRATION RATE: 20 BRPM | OXYGEN SATURATION: 95 % | BODY MASS INDEX: 29.23 KG/M2

## 2023-06-27 DIAGNOSIS — R07.9 CHEST PAIN: ICD-10-CM

## 2023-06-27 DIAGNOSIS — R79.89 ELEVATED SERUM CREATININE: Primary | ICD-10-CM

## 2023-06-27 DIAGNOSIS — I50.9 HEART FAILURE: ICD-10-CM

## 2023-06-27 DIAGNOSIS — G62.9 NEUROPATHY: ICD-10-CM

## 2023-06-27 DIAGNOSIS — Z51.5 PALLIATIVE CARE ENCOUNTER: Primary | ICD-10-CM

## 2023-06-27 DIAGNOSIS — F32.A DEPRESSION, UNSPECIFIED DEPRESSION TYPE: ICD-10-CM

## 2023-06-27 DIAGNOSIS — Z71.89 ADVANCE CARE PLANNING: ICD-10-CM

## 2023-06-27 DIAGNOSIS — R10.13 EPIGASTRIC PAIN: ICD-10-CM

## 2023-06-27 DIAGNOSIS — R52 PAIN: ICD-10-CM

## 2023-06-27 DIAGNOSIS — N18.4 CKD (CHRONIC KIDNEY DISEASE), STAGE IV: Chronic | ICD-10-CM

## 2023-06-27 LAB
ALBUMIN SERPL BCP-MCNC: 4.1 G/DL (ref 3.5–5.2)
ALP SERPL-CCNC: 63 U/L (ref 55–135)
ALT SERPL W/O P-5'-P-CCNC: 14 U/L (ref 10–44)
ANION GAP SERPL CALC-SCNC: 14 MMOL/L (ref 8–16)
ANISOCYTOSIS BLD QL SMEAR: ABNORMAL
AST SERPL-CCNC: 20 U/L (ref 10–40)
BACTERIA #/AREA URNS HPF: ABNORMAL /HPF
BASOPHILS # BLD AUTO: 0.02 K/UL (ref 0–0.2)
BASOPHILS NFR BLD: 0.4 % (ref 0–1.9)
BILIRUB SERPL-MCNC: 1.7 MG/DL (ref 0.1–1)
BILIRUB UR QL STRIP: NEGATIVE
BNP SERPL-MCNC: 794 PG/ML (ref 0–99)
BUN SERPL-MCNC: 44 MG/DL (ref 6–20)
CALCIUM SERPL-MCNC: 9.7 MG/DL (ref 8.7–10.5)
CHLORIDE SERPL-SCNC: 104 MMOL/L (ref 95–110)
CLARITY UR: CLEAR
CO2 SERPL-SCNC: 25 MMOL/L (ref 23–29)
COLOR UR: YELLOW
CREAT SERPL-MCNC: 2.9 MG/DL (ref 0.5–1.4)
DACRYOCYTES BLD QL SMEAR: ABNORMAL
DIFFERENTIAL METHOD: ABNORMAL
EOSINOPHIL # BLD AUTO: 0.1 K/UL (ref 0–0.5)
EOSINOPHIL NFR BLD: 2 % (ref 0–8)
ERYTHROCYTE [DISTWIDTH] IN BLOOD BY AUTOMATED COUNT: 28.9 % (ref 11.5–14.5)
EST. GFR  (NO RACE VARIABLE): 18 ML/MIN/1.73 M^2
GLUCOSE SERPL-MCNC: 89 MG/DL (ref 70–110)
GLUCOSE UR QL STRIP: ABNORMAL
HCT VFR BLD AUTO: 32.5 % (ref 37–48.5)
HGB BLD-MCNC: 9.6 G/DL (ref 12–16)
HGB UR QL STRIP: ABNORMAL
HYALINE CASTS #/AREA URNS LPF: 44 /LPF
HYPOCHROMIA BLD QL SMEAR: ABNORMAL
IMM GRANULOCYTES # BLD AUTO: 0.02 K/UL (ref 0–0.04)
IMM GRANULOCYTES NFR BLD AUTO: 0.4 % (ref 0–0.5)
KETONES UR QL STRIP: NEGATIVE
LEUKOCYTE ESTERASE UR QL STRIP: NEGATIVE
LIPASE SERPL-CCNC: 119 U/L (ref 4–60)
LYMPHOCYTES # BLD AUTO: 0.9 K/UL (ref 1–4.8)
LYMPHOCYTES NFR BLD: 19.8 % (ref 18–48)
MCH RBC QN AUTO: 16.8 PG (ref 27–31)
MCHC RBC AUTO-ENTMCNC: 29.5 G/DL (ref 32–36)
MCV RBC AUTO: 57 FL (ref 82–98)
MICROSCOPIC COMMENT: ABNORMAL
MONOCYTES # BLD AUTO: 0.5 K/UL (ref 0.3–1)
MONOCYTES NFR BLD: 10.5 % (ref 4–15)
NEUTROPHILS # BLD AUTO: 3 K/UL (ref 1.8–7.7)
NEUTROPHILS NFR BLD: 66.9 % (ref 38–73)
NITRITE UR QL STRIP: NEGATIVE
NRBC BLD-RTO: 1 /100 WBC
OVALOCYTES BLD QL SMEAR: ABNORMAL
PH UR STRIP: 6 [PH] (ref 5–8)
PLATELET # BLD AUTO: 182 K/UL (ref 150–450)
PLATELET BLD QL SMEAR: ABNORMAL
PMV BLD AUTO: ABNORMAL FL (ref 9.2–12.9)
POCT GLUCOSE: 144 MG/DL (ref 70–110)
POIKILOCYTOSIS BLD QL SMEAR: ABNORMAL
POLYCHROMASIA BLD QL SMEAR: ABNORMAL
POTASSIUM SERPL-SCNC: 4 MMOL/L (ref 3.5–5.1)
PROT SERPL-MCNC: 7.3 G/DL (ref 6–8.4)
PROT UR QL STRIP: ABNORMAL
RBC # BLD AUTO: 5.7 M/UL (ref 4–5.4)
RBC #/AREA URNS HPF: 2 /HPF (ref 0–4)
SCHISTOCYTES BLD QL SMEAR: ABNORMAL
SODIUM SERPL-SCNC: 143 MMOL/L (ref 136–145)
SP GR UR STRIP: 1.01 (ref 1–1.03)
TARGETS BLD QL SMEAR: ABNORMAL
TROPONIN I SERPL DL<=0.01 NG/ML-MCNC: 1.44 NG/ML (ref 0–0.03)
URN SPEC COLLECT METH UR: ABNORMAL
UROBILINOGEN UR STRIP-ACNC: NEGATIVE EU/DL
WBC # BLD AUTO: 4.55 K/UL (ref 3.9–12.7)
WBC #/AREA URNS HPF: 3 /HPF (ref 0–5)

## 2023-06-27 PROCEDURE — 36415 COLL VENOUS BLD VENIPUNCTURE: CPT | Performed by: STUDENT IN AN ORGANIZED HEALTH CARE EDUCATION/TRAINING PROGRAM

## 2023-06-27 PROCEDURE — 83880 ASSAY OF NATRIURETIC PEPTIDE: CPT | Performed by: STUDENT IN AN ORGANIZED HEALTH CARE EDUCATION/TRAINING PROGRAM

## 2023-06-27 PROCEDURE — 84484 ASSAY OF TROPONIN QUANT: CPT | Performed by: STUDENT IN AN ORGANIZED HEALTH CARE EDUCATION/TRAINING PROGRAM

## 2023-06-27 PROCEDURE — 80053 COMPREHEN METABOLIC PANEL: CPT | Performed by: STUDENT IN AN ORGANIZED HEALTH CARE EDUCATION/TRAINING PROGRAM

## 2023-06-27 PROCEDURE — 20600001 HC STEP DOWN PRIVATE ROOM

## 2023-06-27 PROCEDURE — 99285 EMERGENCY DEPT VISIT HI MDM: CPT | Mod: 25

## 2023-06-27 PROCEDURE — 93010 ELECTROCARDIOGRAM REPORT: CPT | Mod: ,,, | Performed by: INTERNAL MEDICINE

## 2023-06-27 PROCEDURE — 81000 URINALYSIS NONAUTO W/SCOPE: CPT | Performed by: STUDENT IN AN ORGANIZED HEALTH CARE EDUCATION/TRAINING PROGRAM

## 2023-06-27 PROCEDURE — 82962 GLUCOSE BLOOD TEST: CPT

## 2023-06-27 PROCEDURE — 93005 ELECTROCARDIOGRAM TRACING: CPT

## 2023-06-27 PROCEDURE — 85025 COMPLETE CBC W/AUTO DIFF WBC: CPT | Performed by: STUDENT IN AN ORGANIZED HEALTH CARE EDUCATION/TRAINING PROGRAM

## 2023-06-27 PROCEDURE — 93010 EKG 12-LEAD: ICD-10-PCS | Mod: ,,, | Performed by: INTERNAL MEDICINE

## 2023-06-27 PROCEDURE — 83690 ASSAY OF LIPASE: CPT | Performed by: STUDENT IN AN ORGANIZED HEALTH CARE EDUCATION/TRAINING PROGRAM

## 2023-06-27 RX ORDER — ACETAMINOPHEN 325 MG/1
650 TABLET ORAL EVERY 4 HOURS PRN
Status: DISCONTINUED | OUTPATIENT
Start: 2023-06-28 | End: 2023-06-30 | Stop reason: HOSPADM

## 2023-06-27 RX ORDER — SODIUM CHLORIDE 0.9 % (FLUSH) 0.9 %
10 SYRINGE (ML) INJECTION
Status: DISCONTINUED | OUTPATIENT
Start: 2023-06-28 | End: 2023-06-30 | Stop reason: HOSPADM

## 2023-06-27 RX ORDER — NITROGLYCERIN 0.4 MG/1
0.4 TABLET SUBLINGUAL EVERY 5 MIN PRN
Status: DISCONTINUED | OUTPATIENT
Start: 2023-06-27 | End: 2023-06-30 | Stop reason: HOSPADM

## 2023-06-27 RX ORDER — ONDANSETRON 4 MG/1
8 TABLET, ORALLY DISINTEGRATING ORAL EVERY 8 HOURS PRN
Status: DISCONTINUED | OUTPATIENT
Start: 2023-06-28 | End: 2023-06-28

## 2023-06-27 RX ORDER — ONDANSETRON 2 MG/ML
4 INJECTION INTRAMUSCULAR; INTRAVENOUS EVERY 8 HOURS PRN
Status: DISCONTINUED | OUTPATIENT
Start: 2023-06-28 | End: 2023-06-28

## 2023-06-27 NOTE — ED NOTES
Pt has dobutamine home infusion continuously flowing at 2.5 mcg/kg/min. NOTIFIED MD. MD WILL ORDER TO CONTINUE HOME INFUSION

## 2023-06-27 NOTE — ASSESSMENT & PLAN NOTE
Sent to ED by PCP for Cr 2.9    No indications for HD at this time    Plan:  - Trend Cr  - Strict I&Os  - Avoid nephrotoxic agents  - Renally adjust medications

## 2023-06-27 NOTE — ASSESSMENT & PLAN NOTE
NYHA IV 2/2 NICM  On palliative dobutamine   Not candidate for advanced options      Echo 3/26/23  · The left ventricle is severely enlarged with eccentric hypertrophy and severely decreased systolic function. The estimated ejection fraction is 15%.  · Normal right ventricular size with moderately reduced right ventricular systolic function.  · Grade II left ventricular diastolic dysfunction.  · Biatrial enlargement.  · Mild mitral regurgitation.  · Small pericardial effusion.  · The estimated PA systolic pressure is 51 mmHg (by tricuspid regurgitation velocity). The estimated mean PA pressure is 39mm Hg.  · Intermediate central venous pressure (8 mmHg).       Home GDMT/Diuretic:  - Beta Blocker: None, on ionotropes  - ACEi/ARB/ARNi: Entresto 50mg  - MRA: Held for renal function  - SGLT2: Empagliflozin  - Diuretic: PO Bumex 2mg    BNP  Recent Labs   Lab 06/27/23  1334   *     Cr 2.9    Plan:  - Continue home Dobutamine 5 mcg/kg/min  - Initiate/Continue indicated GDMT as tolerated  - Daily weights  - Strict I/Os  - Fluid restriction at 1500mL  - lasix 80mg Iv given and will likely start drip if needed  - Place urinary catheter for accurate I & Os  - Cardiac diet  - Maintain on telemetry and daily EKGs, maintain Mag >2 & K+ >4  - lactic WNL  - will repeat an echo in the am  - Will monitor CVP and SVO2

## 2023-06-27 NOTE — TELEPHONE ENCOUNTER
6/27/2023    Follow Up Reminder: labs drawn 6/26/23 results reviewed today 6/27/2023.  All lab values ordered are present in Epic.  NA + 143 K+ 4.0  BUN 43, and CR 2.7, WBC 3.64, HGB 8.6, HCT 29.1, SHERRY 163.  Assessment forwarded to MD Alisa Camilo for review.    Assessment:  Spoke with patient via phone, patient reports feeling very tired.  BP in the AM are reported as 70/54, 80/60, and 117/68, patient states that BP in the are 121/60.  Patient does not report any SOB but does report a slight wheeze feeling but her cough is improved.  Patient reports no edema in bilateral lower extremities or ABD.  Patient has not weighed this am, yesterdays weight was 184lbs, weight range is reported as 184-186 lbs.  Pt reports vomiting every day, often waking in the middle of the night to vomit.  Diet modifications have been made to lessen intake prior to bed, patient continues to vomit most nights with a reported feeling of nausea throughout the day.  Patient reports being able to do ADL's but just feels fatigued and nauseated.  Patient reports dobutamine infusion remains in place; infusion bag changed yesterday at infusion center with line draw and dressing change.    Discussed with patient lab values reviewed from yesterday and an increase in CR to 2.7; patient verbalizes understanding of lab values and phone assessment being forwarded to MD Alisa Camilo for review and direction..

## 2023-06-27 NOTE — ED PROVIDER NOTES
"Encounter Date: 6/27/2023       History     Chief Complaint   Patient presents with    Abnormal Labs     Patient to ER reports she is being sent to the ER by her MD after receiving high kidney levels, also reports increased weakness over the past few days      57-year-old female with history of anemia, AFib, CKD, congestive heart failure on dobutamine pump, previous DVT, diabetes, hypertension, presenting with complaints of generalized fatigue, epigastric pain, and elevated creatinine.  Patient reports that for the last few months she is been vomiting a couple of times daily.  Patient was admitted about 10 days ago for similar symptoms and persistent WASHINGTON.  Patient had outpatient lab work today that showed elevated creatinine of 2.7, and was asked to come into the emergency department.  Patient denies lower abdominal pain, but does endorse some mild epigastric pain and chest tightness that she says radiates around to the right side.  Patient reports that she has this chest tightness on and off occasionally.  No fever, cough, congestion.    Review of patient's allergies indicates:   Allergen Reactions    Penicillins Hives and Other (See Comments)    Iodinated contrast media Nausea And Vomiting    Oxycodone-acetaminophen Other (See Comments)     Nausea, Dizziness, Anxiety.  "I don't like how it makes me feel."   Given Hydromorphone 0.5mg IVP  Without problems.  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 Metabolism of other drugs not listed here may also be impacted by UGT1A1 enzyme activity.       Tramadol Nausea And Vomiting and Other (See Comments)     Other reaction(s): Other (See Comments)    Valsartan Other (See Comments)     Past Medical History:   Diagnosis Date    Anemia     Arthritis     Atrial fibrillation     OAC    Chronic respiratory failure with hypoxia, on home oxygen therapy     2L with activity, off at rest.  Per Pulm  no overt evidence of ILD or COPD on PFTs and CT to explain O2 needs.    CKD (chronic kidney disease), stage IV 05/08/2018    Congestive heart failure     s/p AICD placement,    Deep vein thrombosis     Depression     elevated bilirubin d/t Gilbert's syndrome     confirmed by [facility] genetic testing, evaluated by hepatology    Encounter for blood transfusion     GERD (gastroesophageal reflux disease)     Hypertension     Pheochromocytoma, malignant     Right kidney mass     Sleep apnea     Thalassemia trait, alpha     Thyroid disease     Type 2 diabetes mellitus with hyperglycemia, without long-term current use of insulin 08/13/2020     Past Surgical History:   Procedure Laterality Date    APPENDECTOMY      BONE MARROW BIOPSY      CARDIAC DEFIBRILLATOR PLACEMENT Left 12/2016    CARDIAC ELECTROPHYSIOLOGY MAPPING AND ABLATION      CARDIAC ELECTROPHYSIOLOGY MAPPING AND ABLATION      COLONOSCOPY N/A 5/6/2022    Procedure: COLONOSCOPY;  Surgeon: Arely Betancourt MD;  Location: [facility] ([address] [place] [address]);  Service: Endoscopy;  Laterality: N/A;  heart transplant candidate/ EF 25% - 2nd floor/ defib - Biotronik - ERW  Eliquis - per Dr. Cortez with [facility], Pt ok to hold Eliquis x 2 days prior-see media tab-outside correspondence dated 12/30/21  - ERW  verbal instructions/portal instructions/email instructions - s    EYE SURGERY      due to running tears    FRACTURE SURGERY Left     hand 5th digit    HYSTERECTOMY      KNEE SURGERY Left 2016    hematoma    LIVER  BIOPSY  10/24/2018    Minimal steatosis, predominantly macrovesicular, 1%, Minimal nonspecific portal inflammation, no fibrosis. No findings on biopsy to explain elevated bilirubin levels. Could be d/t Gilbert's =?- hemolysis    RIGHT HEART CATHETERIZATION Right 2021    Procedure: INSERTION, CATHETER, RIGHT HEART;  Surgeon: Irving Cardenas MD;  Location: [facility] CATH LAB;  Service: Cardiology;  Laterality: Right;    RIGHT HEART CATHETERIZATION Right 2022    Procedure: INSERTION, CATHETER, RIGHT HEART;  Surgeon: Burke Camilo MD;  Location: [facility] CATH LAB;  Service: Cardiology;  Laterality: Right;    RIGHT HEART CATHETERIZATION Right 3/29/2023    Procedure: INSERTION, CATHETER, RIGHT HEART;  Surgeon: Katie Liriano DO;  Location: [facility] CATH LAB;  Service: Cardiology;  Laterality: Right;    TRANSJUGULAR BIOPSY OF LIVER N/A 10/24/2018    Procedure: BIOPSY, LIVER, TRANSJUGULAR APPROACH;  Surgeon: Carmen Diagnostic Provider;  Location: [facility] OR [address] [place] [address];  Service: Radiology;  Laterality: N/A;     Family History   Problem Relation Age of Onset    Cancer Mother         pancreatic CA early 50's    Heart disease Father          MI in late 50's    Hypertension Father     Heart attack Father     Heart disease Sister     Heart disease Brother     Cirrhosis Brother         alcoholic    Heart disease Sister     Heart disease Brother     Hypertension Brother     Diabetes Brother      Social History     Tobacco Use    Smoking status: Never    Smokeless tobacco: Never   Substance Use Topics    Alcohol use: Yes     Comment: up to 1 yr ago drank 2-3 drinks on occasion but sporadic    Drug use: No     Review of Systems   Constitutional:  Negative for fever.   HENT:  Negative for sore throat.    Respiratory:  Negative for shortness of breath.    Cardiovascular:  Positive for chest pain.   Gastrointestinal:  Positive for abdominal pain, nausea and vomiting. Negative for diarrhea.   Genitourinary:  Negative for  dysuria.   Musculoskeletal:  Negative for back pain.   Skin:  Negative for rash.   Neurological:  Negative for weakness.   Hematological:  Does not bruise/bleed easily.     Physical Exam     Initial Vitals [06/27/23 1322]   BP Pulse Resp Temp SpO2   116/69 89 18 97.8 °F (36.6 °C) (!) 94 %      MAP       --         Physical Exam    Nursing note and vitals reviewed.  Constitutional: She appears well-developed. She is not diaphoretic. No distress.   HENT:   Head: Normocephalic.   Eyes: Pupils are equal, round, and reactive to light.   Neck:   Normal range of motion.  Cardiovascular:            No murmur heard.  Pulmonary/Chest: No respiratory distress.   Clear lungs bilaterally.  No respiratory distress.  No wheezing or rales.  Good air movement.     Abdominal: Abdomen is soft.   No TTP diffusely. No guarding, rebound, or masses. No CVAT bilaterally.     Musculoskeletal:         General: No edema.      Cervical back: Normal range of motion.     Neurological: She is alert and oriented to person, place, and time.   Skin: Skin is warm.   Psychiatric: She has a normal mood and affect.       ED Course   Procedures  Labs Reviewed   CBC W/ AUTO DIFFERENTIAL   COMPREHENSIVE METABOLIC PANEL   TROPONIN I   URINALYSIS, REFLEX TO URINE CULTURE   LIPASE   B-TYPE NATRIURETIC PEPTIDE     EKG Readings: (Independently Interpreted)   Initial Reading: No STEMI. Rhythm: Normal Sinus Rhythm. Heart Rate: 90. Ectopy: No Ectopy. Conduction: Normal.     Imaging Results    None          Medications - No data to display  Medical Decision Making:   Differential Diagnosis:   DDX:  Patient with complex cardiac history and recent WASHINGTON eyes due to vomiting, and difficulty managing overall fluid levels, presenting with WASHINGTON and outpatient lab work.  Patient also reporting chest tightness.  Will rule out fluid overload, pneumonia, pneumothorax, ACS.  Possible WASHINGTON due to dehydration as patient is taking Bumex.  DX:  CBC, CMP, troponin, BNP, UA, lipase,  EKG, chest x-ray  TX:  Analgesia PRN.  More treatment pending studies  Dispo:  Pending workup                            Clinical Impression:   Final diagnoses:  [R10.13] Epigastric pain  [R79.89] Elevated serum creatinine (Primary)               Efe Norris MD  06/27/23 1333       Efe Norris MD  06/27/23 3378

## 2023-06-27 NOTE — HPI
Ms. Hafsa Hawley is a 57 year old woman with combined systolic/diastolic HF(LVEF 15%) s/p ICD on palliative dobutamine, PAF on eliquis, CKD4, DM2, HTN, HLD, Gilbert's syndrome, who presented as a transfer from [facility] for decompensated heart failure and WASHINGTON on CKD.     Of note she was recently admitted to [facility] on the hospital medicine service for WASHINGTON, and at the time her Bumex was decreased from BID to daily. She was evaluated by Advanced HF team and deemed not a candidate for advanced options due to progressive renal dysfunction. She was discharged home on palliative dobutmine as well as adjusted GDMT (entresto, jardiance and Bidil). Her renal function at the time stabilized and on DC her Cr was 2.4.     She presented this time to [facility] with generalized fatigue and epigastric pain. She has had near daily vomiting. Patient fell at home as well as she was too weak. Has left sided chest pain, shoulder pain, back pain and hip pain. She has chronic LAGUERRE, orthopnea. Routine labs checked earlier in the day and given worsening Cr 2.7 was told to present to the ED by Dr Alisa Camilo. At OSH her Cr on repeat was 2.9. At [facility] Nephrology services were not available thus patient was transferred to [facility] for further workup.

## 2023-06-27 NOTE — ASSESSMENT & PLAN NOTE
AF History: Permanent atrial fibrillation  Onset: >48hrs  PVI/cryo: No  Meds: Amiodarone  EF: NYHA IV NICM, EF 15%  MELISSA: Likely      Plan:  - Cont Amiodarone  - Continue Anticoagulation with Eliquis  - Continuous telemetry  - Maintain K > 4, Mg > 2, Ca wnl

## 2023-06-27 NOTE — TELEPHONE ENCOUNTER
6/27/2023    Received written orders from MD Alisa Camilo in response to ERIC GARCIA RN NN 6/26/2023.     -----------------------------------------------------------------  Written orders below:    MD Mindy Fam RN    Bases on your follow up she is not doing well. Not only the creatinine is up again but she is throwing up daily. I recommend she goes to the ER for evaluation of her symptoms as she may me decompensated.   Thank you    Previous Messages       ----- Message -----   From: Mindy Mensah RN   Sent: 6/27/2023   8:56 AM CDT   To: Burke Camilo MD   Subject: Cosign-Required Note     -----------------------------------------------------------------------    Spoke with patient via phone to discuss MD Alisa Camilo instructions to get to an ER for evaluation of her symptoms as she may be decompensated related to her elevated CR level of 2.7 and the daily vomiting.  Patient voices understanding and states that she will have her  drive her to the ER in [facility], within the next 30 minutes.   Discussed the importance of follow up with local cardiologist MD Javier.  Patient voices understanding and repeats back instructions  of not driving herself to the emergency room but seeking further evaluation at [facility] in the next 30 minutes.

## 2023-06-28 PROBLEM — I48.0 PAROXYSMAL A-FIB: Status: ACTIVE | Noted: 2023-06-28

## 2023-06-28 PROBLEM — R07.89 CHEST WALL TENDERNESS: Status: ACTIVE | Noted: 2023-06-28

## 2023-06-28 LAB
ALBUMIN SERPL BCP-MCNC: 3.7 G/DL (ref 3.5–5.2)
ALLENS TEST: ABNORMAL
ALLENS TEST: ABNORMAL
ALP SERPL-CCNC: 53 U/L (ref 55–135)
ALT SERPL W/O P-5'-P-CCNC: 11 U/L (ref 10–44)
ANION GAP SERPL CALC-SCNC: 13 MMOL/L (ref 8–16)
ANION GAP SERPL CALC-SCNC: 16 MMOL/L (ref 8–16)
ANION GAP SERPL CALC-SCNC: 9 MMOL/L (ref 8–16)
ANISOCYTOSIS BLD QL SMEAR: ABNORMAL
ANISOCYTOSIS BLD QL SMEAR: ABNORMAL
APTT PPP: 25.7 SEC (ref 21–32)
APTT PPP: 31.9 SEC (ref 21–32)
AST SERPL-CCNC: 20 U/L (ref 10–40)
BASO STIPL BLD QL SMEAR: ABNORMAL
BASOPHILS # BLD AUTO: 0.02 K/UL (ref 0–0.2)
BASOPHILS # BLD AUTO: 0.02 K/UL (ref 0–0.2)
BASOPHILS NFR BLD: 0.4 % (ref 0–1.9)
BASOPHILS NFR BLD: 0.5 % (ref 0–1.9)
BILIRUB SERPL-MCNC: 1.6 MG/DL (ref 0.1–1)
BUN SERPL-MCNC: 45 MG/DL (ref 6–20)
BUN SERPL-MCNC: 45 MG/DL (ref 6–20)
BUN SERPL-MCNC: 47 MG/DL (ref 6–20)
CALCIUM SERPL-MCNC: 9.1 MG/DL (ref 8.7–10.5)
CALCIUM SERPL-MCNC: 9.4 MG/DL (ref 8.7–10.5)
CALCIUM SERPL-MCNC: 9.6 MG/DL (ref 8.7–10.5)
CHLORIDE SERPL-SCNC: 101 MMOL/L (ref 95–110)
CHLORIDE SERPL-SCNC: 102 MMOL/L (ref 95–110)
CHLORIDE SERPL-SCNC: 105 MMOL/L (ref 95–110)
CO2 SERPL-SCNC: 24 MMOL/L (ref 23–29)
CO2 SERPL-SCNC: 25 MMOL/L (ref 23–29)
CO2 SERPL-SCNC: 28 MMOL/L (ref 23–29)
CREAT SERPL-MCNC: 2.6 MG/DL (ref 0.5–1.4)
CREAT SERPL-MCNC: 2.9 MG/DL (ref 0.5–1.4)
CREAT SERPL-MCNC: 3.1 MG/DL (ref 0.5–1.4)
DACRYOCYTES BLD QL SMEAR: ABNORMAL
DACRYOCYTES BLD QL SMEAR: ABNORMAL
DELSYS: ABNORMAL
DELSYS: ABNORMAL
DIFFERENTIAL METHOD: ABNORMAL
DIFFERENTIAL METHOD: ABNORMAL
DOHLE BOD BLD QL SMEAR: PRESENT
EOSINOPHIL # BLD AUTO: 0.1 K/UL (ref 0–0.5)
EOSINOPHIL # BLD AUTO: 0.1 K/UL (ref 0–0.5)
EOSINOPHIL NFR BLD: 1.7 % (ref 0–8)
EOSINOPHIL NFR BLD: 1.8 % (ref 0–8)
ERYTHROCYTE [DISTWIDTH] IN BLOOD BY AUTOMATED COUNT: 26.5 % (ref 11.5–14.5)
ERYTHROCYTE [DISTWIDTH] IN BLOOD BY AUTOMATED COUNT: 26.6 % (ref 11.5–14.5)
EST. GFR  (NO RACE VARIABLE): 16.9 ML/MIN/1.73 M^2
EST. GFR  (NO RACE VARIABLE): 18.3 ML/MIN/1.73 M^2
EST. GFR  (NO RACE VARIABLE): 20.9 ML/MIN/1.73 M^2
GLUCOSE SERPL-MCNC: 126 MG/DL (ref 70–110)
GLUCOSE SERPL-MCNC: 185 MG/DL (ref 70–110)
GLUCOSE SERPL-MCNC: 85 MG/DL (ref 70–110)
HCO3 UR-SCNC: 30.1 MMOL/L (ref 24–28)
HCT VFR BLD AUTO: 29.1 % (ref 37–48.5)
HCT VFR BLD AUTO: 31.4 % (ref 37–48.5)
HGB BLD-MCNC: 9 G/DL (ref 12–16)
HGB BLD-MCNC: 9.4 G/DL (ref 12–16)
HYPOCHROMIA BLD QL SMEAR: ABNORMAL
HYPOCHROMIA BLD QL SMEAR: ABNORMAL
IMM GRANULOCYTES # BLD AUTO: 0.02 K/UL (ref 0–0.04)
IMM GRANULOCYTES # BLD AUTO: 0.03 K/UL (ref 0–0.04)
IMM GRANULOCYTES NFR BLD AUTO: 0.5 % (ref 0–0.5)
IMM GRANULOCYTES NFR BLD AUTO: 0.6 % (ref 0–0.5)
INR PPP: 1 (ref 0.8–1.2)
LACTATE SERPL-SCNC: 0.9 MMOL/L (ref 0.5–2.2)
LYMPHOCYTES # BLD AUTO: 1 K/UL (ref 1–4.8)
LYMPHOCYTES # BLD AUTO: 1 K/UL (ref 1–4.8)
LYMPHOCYTES NFR BLD: 21.8 % (ref 18–48)
LYMPHOCYTES NFR BLD: 22.5 % (ref 18–48)
MAGNESIUM SERPL-MCNC: 1.9 MG/DL (ref 1.6–2.6)
MCH RBC QN AUTO: 17.8 PG (ref 27–31)
MCH RBC QN AUTO: 18 PG (ref 27–31)
MCHC RBC AUTO-ENTMCNC: 29.9 G/DL (ref 32–36)
MCHC RBC AUTO-ENTMCNC: 30.9 G/DL (ref 32–36)
MCV RBC AUTO: 58 FL (ref 82–98)
MCV RBC AUTO: 59 FL (ref 82–98)
MODE: ABNORMAL
MONOCYTES # BLD AUTO: 0.4 K/UL (ref 0.3–1)
MONOCYTES # BLD AUTO: 0.6 K/UL (ref 0.3–1)
MONOCYTES NFR BLD: 13.3 % (ref 4–15)
MONOCYTES NFR BLD: 9.9 % (ref 4–15)
NEUTROPHILS # BLD AUTO: 2.9 K/UL (ref 1.8–7.7)
NEUTROPHILS # BLD AUTO: 2.9 K/UL (ref 1.8–7.7)
NEUTROPHILS NFR BLD: 62.2 % (ref 38–73)
NEUTROPHILS NFR BLD: 64.8 % (ref 38–73)
NRBC BLD-RTO: 1 /100 WBC
NRBC BLD-RTO: 1 /100 WBC
OVALOCYTES BLD QL SMEAR: ABNORMAL
OVALOCYTES BLD QL SMEAR: ABNORMAL
PCO2 BLDA: 55.8 MMHG (ref 35–45)
PH SMN: 7.34 [PH] (ref 7.35–7.45)
PHOSPHATE SERPL-MCNC: 4.1 MG/DL (ref 2.7–4.5)
PLATELET # BLD AUTO: 163 K/UL (ref 150–450)
PLATELET # BLD AUTO: 165 K/UL (ref 150–450)
PLATELET BLD QL SMEAR: ABNORMAL
PLATELET BLD QL SMEAR: ABNORMAL
PMV BLD AUTO: ABNORMAL FL (ref 9.2–12.9)
PMV BLD AUTO: ABNORMAL FL (ref 9.2–12.9)
PO2 BLDA: 27 MMHG (ref 40–60)
PO2 BLDA: 32 MMHG (ref 40–60)
POC BE: 4 MMOL/L
POC SATURATED O2: 45 % (ref 95–100)
POC SATURATED O2: 57 % (ref 95–100)
POC TCO2: 32 MMOL/L (ref 24–29)
POCT GLUCOSE: 147 MG/DL (ref 70–110)
POCT GLUCOSE: 155 MG/DL (ref 70–110)
POIKILOCYTOSIS BLD QL SMEAR: ABNORMAL
POIKILOCYTOSIS BLD QL SMEAR: ABNORMAL
POLYCHROMASIA BLD QL SMEAR: ABNORMAL
POLYCHROMASIA BLD QL SMEAR: ABNORMAL
POTASSIUM SERPL-SCNC: 3.6 MMOL/L (ref 3.5–5.1)
POTASSIUM SERPL-SCNC: 3.7 MMOL/L (ref 3.5–5.1)
POTASSIUM SERPL-SCNC: 4.2 MMOL/L (ref 3.5–5.1)
PROT SERPL-MCNC: 6.7 G/DL (ref 6–8.4)
PROTHROMBIN TIME: 10.7 SEC (ref 9–12.5)
RBC # BLD AUTO: 4.99 M/UL (ref 4–5.4)
RBC # BLD AUTO: 5.29 M/UL (ref 4–5.4)
SAMPLE: ABNORMAL
SAMPLE: ABNORMAL
SCHISTOCYTES BLD QL SMEAR: ABNORMAL
SCHISTOCYTES BLD QL SMEAR: PRESENT
SITE: ABNORMAL
SITE: ABNORMAL
SODIUM SERPL-SCNC: 139 MMOL/L (ref 136–145)
SODIUM SERPL-SCNC: 142 MMOL/L (ref 136–145)
SODIUM SERPL-SCNC: 142 MMOL/L (ref 136–145)
SPHEROCYTES BLD QL SMEAR: ABNORMAL
SPHEROCYTES BLD QL SMEAR: ABNORMAL
STOMATOCYTES BLD QL SMEAR: PRESENT
TARGETS BLD QL SMEAR: ABNORMAL
TARGETS BLD QL SMEAR: ABNORMAL
TOXIC GRANULES BLD QL SMEAR: PRESENT
TOXIC GRANULES BLD QL SMEAR: PRESENT
TROPONIN I SERPL DL<=0.01 NG/ML-MCNC: 1.23 NG/ML (ref 0–0.03)
WBC # BLD AUTO: 4.44 K/UL (ref 3.9–12.7)
WBC # BLD AUTO: 4.67 K/UL (ref 3.9–12.7)
WBC TOXIC VACUOLES BLD QL SMEAR: PRESENT
WBC TOXIC VACUOLES BLD QL SMEAR: PRESENT

## 2023-06-28 PROCEDURE — 99900035 HC TECH TIME PER 15 MIN (STAT)

## 2023-06-28 PROCEDURE — 20600001 HC STEP DOWN PRIVATE ROOM

## 2023-06-28 PROCEDURE — 25000003 PHARM REV CODE 250: Performed by: STUDENT IN AN ORGANIZED HEALTH CARE EDUCATION/TRAINING PROGRAM

## 2023-06-28 PROCEDURE — 83735 ASSAY OF MAGNESIUM: CPT | Performed by: STUDENT IN AN ORGANIZED HEALTH CARE EDUCATION/TRAINING PROGRAM

## 2023-06-28 PROCEDURE — 84100 ASSAY OF PHOSPHORUS: CPT | Performed by: STUDENT IN AN ORGANIZED HEALTH CARE EDUCATION/TRAINING PROGRAM

## 2023-06-28 PROCEDURE — 63600175 PHARM REV CODE 636 W HCPCS: Performed by: STUDENT IN AN ORGANIZED HEALTH CARE EDUCATION/TRAINING PROGRAM

## 2023-06-28 PROCEDURE — 36415 COLL VENOUS BLD VENIPUNCTURE: CPT | Performed by: INTERNAL MEDICINE

## 2023-06-28 PROCEDURE — 94761 N-INVAS EAR/PLS OXIMETRY MLT: CPT

## 2023-06-28 PROCEDURE — 25000003 PHARM REV CODE 250

## 2023-06-28 PROCEDURE — 93010 EKG 12-LEAD: ICD-10-PCS | Mod: ,,, | Performed by: INTERNAL MEDICINE

## 2023-06-28 PROCEDURE — 36415 COLL VENOUS BLD VENIPUNCTURE: CPT | Performed by: STUDENT IN AN ORGANIZED HEALTH CARE EDUCATION/TRAINING PROGRAM

## 2023-06-28 PROCEDURE — 93010 ELECTROCARDIOGRAM REPORT: CPT | Mod: ,,, | Performed by: INTERNAL MEDICINE

## 2023-06-28 PROCEDURE — 83605 ASSAY OF LACTIC ACID: CPT | Performed by: STUDENT IN AN ORGANIZED HEALTH CARE EDUCATION/TRAINING PROGRAM

## 2023-06-28 PROCEDURE — 63600175 PHARM REV CODE 636 W HCPCS

## 2023-06-28 PROCEDURE — 85025 COMPLETE CBC W/AUTO DIFF WBC: CPT | Performed by: STUDENT IN AN ORGANIZED HEALTH CARE EDUCATION/TRAINING PROGRAM

## 2023-06-28 PROCEDURE — 85730 THROMBOPLASTIN TIME PARTIAL: CPT | Performed by: STUDENT IN AN ORGANIZED HEALTH CARE EDUCATION/TRAINING PROGRAM

## 2023-06-28 PROCEDURE — 97165 OT EVAL LOW COMPLEX 30 MIN: CPT

## 2023-06-28 PROCEDURE — 80048 BASIC METABOLIC PNL TOTAL CA: CPT | Mod: XB

## 2023-06-28 PROCEDURE — 85730 THROMBOPLASTIN TIME PARTIAL: CPT | Mod: 91 | Performed by: INTERNAL MEDICINE

## 2023-06-28 PROCEDURE — 93005 ELECTROCARDIOGRAM TRACING: CPT

## 2023-06-28 PROCEDURE — 97530 THERAPEUTIC ACTIVITIES: CPT

## 2023-06-28 PROCEDURE — 36415 COLL VENOUS BLD VENIPUNCTURE: CPT

## 2023-06-28 PROCEDURE — 99233 SBSQ HOSP IP/OBS HIGH 50: CPT | Mod: ,,, | Performed by: INTERNAL MEDICINE

## 2023-06-28 PROCEDURE — 84484 ASSAY OF TROPONIN QUANT: CPT | Performed by: STUDENT IN AN ORGANIZED HEALTH CARE EDUCATION/TRAINING PROGRAM

## 2023-06-28 PROCEDURE — 80053 COMPREHEN METABOLIC PANEL: CPT | Performed by: STUDENT IN AN ORGANIZED HEALTH CARE EDUCATION/TRAINING PROGRAM

## 2023-06-28 PROCEDURE — 25000242 PHARM REV CODE 250 ALT 637 W/ HCPCS: Performed by: STUDENT IN AN ORGANIZED HEALTH CARE EDUCATION/TRAINING PROGRAM

## 2023-06-28 PROCEDURE — 85610 PROTHROMBIN TIME: CPT | Performed by: STUDENT IN AN ORGANIZED HEALTH CARE EDUCATION/TRAINING PROGRAM

## 2023-06-28 PROCEDURE — 99233 PR SUBSEQUENT HOSPITAL CARE,LEVL III: ICD-10-PCS | Mod: ,,, | Performed by: INTERNAL MEDICINE

## 2023-06-28 RX ORDER — IBUPROFEN 200 MG
24 TABLET ORAL
Status: DISCONTINUED | OUTPATIENT
Start: 2023-06-28 | End: 2023-06-28

## 2023-06-28 RX ORDER — HEPARIN SODIUM,PORCINE/D5W 25000/250
0-40 INTRAVENOUS SOLUTION INTRAVENOUS CONTINUOUS
Status: DISCONTINUED | OUTPATIENT
Start: 2023-06-28 | End: 2023-06-28

## 2023-06-28 RX ORDER — INSULIN ASPART 100 [IU]/ML
0-5 INJECTION, SOLUTION INTRAVENOUS; SUBCUTANEOUS
Status: DISCONTINUED | OUTPATIENT
Start: 2023-06-28 | End: 2023-06-30 | Stop reason: HOSPADM

## 2023-06-28 RX ORDER — IBUPROFEN 200 MG
16 TABLET ORAL
Status: DISCONTINUED | OUTPATIENT
Start: 2023-06-28 | End: 2023-06-28

## 2023-06-28 RX ORDER — HYDROMORPHONE HYDROCHLORIDE 1 MG/ML
0.5 INJECTION, SOLUTION INTRAMUSCULAR; INTRAVENOUS; SUBCUTANEOUS ONCE
Status: COMPLETED | OUTPATIENT
Start: 2023-06-28 | End: 2023-06-28

## 2023-06-28 RX ORDER — LANOLIN ALCOHOL/MO/W.PET/CERES
1 CREAM (GRAM) TOPICAL DAILY
Status: DISCONTINUED | OUTPATIENT
Start: 2023-06-28 | End: 2023-06-30 | Stop reason: HOSPADM

## 2023-06-28 RX ORDER — GLUCAGON 1 MG
1 KIT INJECTION
Status: DISCONTINUED | OUTPATIENT
Start: 2023-06-28 | End: 2023-06-30 | Stop reason: HOSPADM

## 2023-06-28 RX ORDER — ROPINIROLE 1 MG/1
4 TABLET, FILM COATED ORAL NIGHTLY
Status: DISCONTINUED | OUTPATIENT
Start: 2023-06-28 | End: 2023-06-30 | Stop reason: HOSPADM

## 2023-06-28 RX ORDER — MECLIZINE HCL 12.5 MG 12.5 MG/1
25 TABLET ORAL 3 TIMES DAILY PRN
Status: DISCONTINUED | OUTPATIENT
Start: 2023-06-28 | End: 2023-06-30 | Stop reason: HOSPADM

## 2023-06-28 RX ORDER — DOBUTAMINE HYDROCHLORIDE 400 MG/100ML
5 INJECTION INTRAVENOUS CONTINUOUS
Status: DISCONTINUED | OUTPATIENT
Start: 2023-06-28 | End: 2023-06-30 | Stop reason: HOSPADM

## 2023-06-28 RX ORDER — ROPINIROLE 1 MG/1
4 TABLET, FILM COATED ORAL DAILY
Status: DISCONTINUED | OUTPATIENT
Start: 2023-06-28 | End: 2023-06-28

## 2023-06-28 RX ORDER — DEXTROSE 40 %
16 GEL (GRAM) ORAL
Status: DISCONTINUED | OUTPATIENT
Start: 2023-06-28 | End: 2023-06-30 | Stop reason: HOSPADM

## 2023-06-28 RX ORDER — PANTOPRAZOLE SODIUM 40 MG/1
40 TABLET, DELAYED RELEASE ORAL DAILY
Status: DISCONTINUED | OUTPATIENT
Start: 2023-06-28 | End: 2023-06-30 | Stop reason: HOSPADM

## 2023-06-28 RX ORDER — GLUCAGON 1 MG
1 KIT INJECTION
Status: DISCONTINUED | OUTPATIENT
Start: 2023-06-28 | End: 2023-06-28

## 2023-06-28 RX ORDER — DEXTROSE 40 %
24 GEL (GRAM) ORAL
Status: DISCONTINUED | OUTPATIENT
Start: 2023-06-28 | End: 2023-06-30 | Stop reason: HOSPADM

## 2023-06-28 RX ORDER — HYDROCODONE BITARTRATE AND ACETAMINOPHEN 7.5; 325 MG/1; MG/1
1 TABLET ORAL EVERY 6 HOURS PRN
Status: DISCONTINUED | OUTPATIENT
Start: 2023-06-28 | End: 2023-06-30 | Stop reason: HOSPADM

## 2023-06-28 RX ORDER — AMIODARONE HYDROCHLORIDE 200 MG/1
200 TABLET ORAL DAILY
Status: DISCONTINUED | OUTPATIENT
Start: 2023-06-28 | End: 2023-06-30 | Stop reason: HOSPADM

## 2023-06-28 RX ORDER — FUROSEMIDE 10 MG/ML
80 INJECTION INTRAMUSCULAR; INTRAVENOUS ONCE
Status: COMPLETED | OUTPATIENT
Start: 2023-06-28 | End: 2023-06-28

## 2023-06-28 RX ORDER — CETIRIZINE HYDROCHLORIDE 10 MG/1
10 TABLET ORAL DAILY PRN
Status: DISCONTINUED | OUTPATIENT
Start: 2023-06-28 | End: 2023-06-30 | Stop reason: HOSPADM

## 2023-06-28 RX ORDER — ALBUTEROL SULFATE 90 UG/1
2 AEROSOL, METERED RESPIRATORY (INHALATION) EVERY 6 HOURS PRN
Status: DISCONTINUED | OUTPATIENT
Start: 2023-06-28 | End: 2023-06-30 | Stop reason: HOSPADM

## 2023-06-28 RX ORDER — ATORVASTATIN CALCIUM 40 MG/1
40 TABLET, FILM COATED ORAL NIGHTLY
Status: DISCONTINUED | OUTPATIENT
Start: 2023-06-28 | End: 2023-06-30 | Stop reason: HOSPADM

## 2023-06-28 RX ORDER — INSULIN ASPART 100 [IU]/ML
0-5 INJECTION, SOLUTION INTRAVENOUS; SUBCUTANEOUS
Status: DISCONTINUED | OUTPATIENT
Start: 2023-06-28 | End: 2023-06-28

## 2023-06-28 RX ORDER — PREGABALIN 50 MG/1
50 CAPSULE ORAL 2 TIMES DAILY
Status: DISCONTINUED | OUTPATIENT
Start: 2023-06-28 | End: 2023-06-30 | Stop reason: HOSPADM

## 2023-06-28 RX ORDER — SERTRALINE HYDROCHLORIDE 25 MG/1
25 TABLET, FILM COATED ORAL DAILY
Status: DISCONTINUED | OUTPATIENT
Start: 2023-06-29 | End: 2023-06-30 | Stop reason: HOSPADM

## 2023-06-28 RX ORDER — ESCITALOPRAM OXALATE 10 MG/1
10 TABLET ORAL DAILY
Status: DISCONTINUED | OUTPATIENT
Start: 2023-06-28 | End: 2023-06-28

## 2023-06-28 RX ORDER — FUROSEMIDE 10 MG/ML
80 INJECTION INTRAMUSCULAR; INTRAVENOUS 3 TIMES DAILY
Status: DISCONTINUED | OUTPATIENT
Start: 2023-06-28 | End: 2023-06-29

## 2023-06-28 RX ORDER — OXYCODONE AND ACETAMINOPHEN 7.5; 325 MG/1; MG/1
1 TABLET ORAL ONCE
Status: DISCONTINUED | OUTPATIENT
Start: 2023-06-28 | End: 2023-06-28

## 2023-06-28 RX ORDER — BUMETANIDE 2 MG/1
2 TABLET ORAL NIGHTLY
Status: ON HOLD | COMMUNITY
End: 2023-06-30 | Stop reason: HOSPADM

## 2023-06-28 RX ADMIN — APIXABAN 5 MG: 5 TABLET, FILM COATED ORAL at 10:06

## 2023-06-28 RX ADMIN — ISOSORBIDE DINITRATE: 20 TABLET ORAL at 08:06

## 2023-06-28 RX ADMIN — ISOSORBIDE DINITRATE: 20 TABLET ORAL at 11:06

## 2023-06-28 RX ADMIN — FUROSEMIDE 80 MG: 10 INJECTION, SOLUTION INTRAMUSCULAR; INTRAVENOUS at 10:06

## 2023-06-28 RX ADMIN — AMIODARONE HYDROCHLORIDE 200 MG: 200 TABLET ORAL at 08:06

## 2023-06-28 RX ADMIN — FUROSEMIDE 10 MG/HR: 10 INJECTION, SOLUTION INTRAMUSCULAR; INTRAVENOUS at 05:06

## 2023-06-28 RX ADMIN — ISOSORBIDE DINITRATE: 20 TABLET ORAL at 03:06

## 2023-06-28 RX ADMIN — HEPARIN SODIUM 15 UNITS/KG/HR: 10000 INJECTION, SOLUTION INTRAVENOUS at 01:06

## 2023-06-28 RX ADMIN — HEPARIN SODIUM 12 UNITS/KG/HR: 10000 INJECTION, SOLUTION INTRAVENOUS at 05:06

## 2023-06-28 RX ADMIN — DOBUTAMINE IN DEXTROSE 5 MCG/KG/MIN: 400 INJECTION, SOLUTION INTRAVENOUS at 02:06

## 2023-06-28 RX ADMIN — HYDROMORPHONE HYDROCHLORIDE 0.5 MG: 1 INJECTION, SOLUTION INTRAMUSCULAR; INTRAVENOUS; SUBCUTANEOUS at 12:06

## 2023-06-28 RX ADMIN — ATORVASTATIN CALCIUM 40 MG: 40 TABLET, FILM COATED ORAL at 10:06

## 2023-06-28 RX ADMIN — FERROUS SULFATE TAB 325 MG (65 MG ELEMENTAL FE) 1 EACH: 325 (65 FE) TAB at 08:06

## 2023-06-28 RX ADMIN — NITROGLYCERIN 0.4 MG: 0.4 TABLET, ORALLY DISINTEGRATING SUBLINGUAL at 12:06

## 2023-06-28 RX ADMIN — ROPINIROLE HYDROCHLORIDE 4 MG: 1 TABLET, FILM COATED ORAL at 10:06

## 2023-06-28 RX ADMIN — PANTOPRAZOLE SODIUM 40 MG: 40 TABLET, DELAYED RELEASE ORAL at 08:06

## 2023-06-28 RX ADMIN — FUROSEMIDE 80 MG: 10 INJECTION, SOLUTION INTRAMUSCULAR; INTRAVENOUS at 02:06

## 2023-06-28 RX ADMIN — FUROSEMIDE 80 MG: 10 INJECTION, SOLUTION INTRAMUSCULAR; INTRAVENOUS at 08:06

## 2023-06-28 RX ADMIN — PREGABALIN 50 MG: 50 CAPSULE ORAL at 08:06

## 2023-06-28 RX ADMIN — ONDANSETRON 8 MG: 4 TABLET, ORALLY DISINTEGRATING ORAL at 12:06

## 2023-06-28 RX ADMIN — PREGABALIN 50 MG: 50 CAPSULE ORAL at 10:06

## 2023-06-28 NOTE — CARE UPDATE
Hemodynamics from Picc line    CVP  13  SVO2 57   CO 6.1  CI 3.1  SVR 1028    Urine output 400cc  Lasix 10cc/hr   5      Plan:   - Will give another 80mg IV bolus of lasix and will increase drip to 20cc/hr  - will need saeed placement, patient is aware.       Discussed with CCU team.     Lei Mar MD  Cardiology fellow

## 2023-06-28 NOTE — SUBJECTIVE & OBJECTIVE
Interval History: NAEON. Patient diuresing well with increased lasix gtt to 20mg/hour. Cr improving with diuresis and DBM. Will plan to transition to home lasix tomorrow as she appears more euvolemic.     Review of Systems   Constitutional: Positive for malaise/fatigue. Negative for chills, diaphoresis, fever, weight gain and weight loss.   HENT:  Negative for congestion, hearing loss, nosebleeds, sore throat and tinnitus.    Eyes:  Negative for visual disturbance.   Cardiovascular:  Positive for dyspnea on exertion and orthopnea. Negative for chest pain, leg swelling, near-syncope, palpitations, paroxysmal nocturnal dyspnea and syncope.   Respiratory:  Negative for cough, hemoptysis, sputum production and wheezing.    Endocrine: Negative for cold intolerance, heat intolerance and polyuria.   Skin:  Negative for nail changes and rash.   Musculoskeletal:  Negative for back pain, falls, joint pain, muscle cramps and muscle weakness.   Gastrointestinal:  Negative for abdominal pain, constipation, diarrhea, heartburn, hematemesis, hematochezia, melena, nausea and vomiting.   Genitourinary:  Negative for dysuria and hematuria.   Neurological:  Negative for excessive daytime sleepiness, dizziness and light-headedness.   Objective:     Vital Signs (Most Recent):  Temp: 98.1 °F (36.7 °C) (06/28/23 0842)  Pulse: 80 (06/28/23 1135)  Resp: 17 (06/28/23 0842)  BP: 108/61 (06/28/23 0842)  SpO2: (!) 91 % (06/28/23 0842) Vital Signs (24h Range):  Temp:  [97.2 °F (36.2 °C)-99 °F (37.2 °C)] 98.1 °F (36.7 °C)  Pulse:  [64-92] 80  Resp:  [15-24] 17  SpO2:  [91 %-99 %] 91 %  BP: (108-147)/(61-87) 108/61     Weight: 81.8 kg (180 lb 3.6 oz)  Body mass index is 29.09 kg/m².     SpO2: (!) 91 %         Intake/Output Summary (Last 24 hours) at 6/28/2023 1216  Last data filed at 6/28/2023 0827  Gross per 24 hour   Intake 480 ml   Output 1300 ml   Net -820 ml       Lines/Drains/Airways       Peripherally Inserted Central Catheter Line   Duration             PICC Double Lumen 05/12/23 1534 right brachial 46 days                       Physical Exam  Vitals and nursing note reviewed.   Constitutional:       General: She is not in acute distress.     Appearance: Normal appearance. She is not ill-appearing, toxic-appearing or diaphoretic.   HENT:      Head: Normocephalic and atraumatic.      Nose: Nose normal. No congestion.      Mouth/Throat:      Mouth: Mucous membranes are moist.      Pharynx: No oropharyngeal exudate.   Eyes:      Extraocular Movements: Extraocular movements intact.      Conjunctiva/sclera: Conjunctivae normal.   Cardiovascular:      Rate and Rhythm: Normal rate and regular rhythm.      Pulses: Normal pulses.      Heart sounds:     No friction rub. No gallop.   Pulmonary:      Effort: Pulmonary effort is normal.      Breath sounds: Rales present.   Abdominal:      General: Abdomen is flat. There is no distension.      Palpations: Abdomen is soft.   Musculoskeletal:         General: Tenderness present. No swelling. Normal range of motion.      Cervical back: Normal range of motion and neck supple. Tenderness present.   Skin:     General: Skin is warm and dry.      Capillary Refill: Capillary refill takes less than 2 seconds.   Neurological:      General: No focal deficit present.      Mental Status: She is alert and oriented to person, place, and time.   Psychiatric:         Mood and Affect: Mood normal.         Thought Content: Thought content normal.          Hemodynamics: 0738  CVP 13  SVO2 57  CO 6.1  CI 3.1   [id number]  On Dobutamine 5mcg  Lasix gtt 20/hour

## 2023-06-28 NOTE — ASSESSMENT & PLAN NOTE
NYHA IV 2/2 NICM  On palliative dobutamine   Not candidate for advanced options      Echo 3/26/23  · The left ventricle is severely enlarged with eccentric hypertrophy and severely decreased systolic function. The estimated ejection fraction is 15%.  · Normal right ventricular size with moderately reduced right ventricular systolic function.  · Grade II left ventricular diastolic dysfunction.  · Biatrial enlargement.  · Mild mitral regurgitation.  · Small pericardial effusion.  · The estimated PA systolic pressure is 51 mmHg (by tricuspid regurgitation velocity). The estimated mean PA pressure is 39mm Hg.  · Intermediate central venous pressure (8 mmHg).       Home GDMT/Diuretic:  - Beta Blocker: None, on ionotropes  - ACEi/ARB/ARNi: Entresto 50mg  - MRA: Held for renal function  - SGLT2: Empagliflozin  - Diuretic: PO Bumex 2mg    BNP  Recent Labs   Lab 06/27/23  1334   *     Cr 2.9    Plan:  - Continue home Dobutamine 5 mcg/kg/min  - Initiate/Continue indicated GDMT as tolerated  - Daily weights  - Strict I/Os  - Fluid restriction at 1500mL  - lasix gtt at 20/hr, plan to transition to home regimen tomorrow  - Place urinary catheter for accurate I & Os  - Cardiac diet  - Maintain on telemetry and daily EKGs, maintain Mag >2 & K+ >4  - lactic WNL  - will repeat an echo in the am  - Will monitor CVP and SVO2

## 2023-06-28 NOTE — PLAN OF CARE
[facility] - Cardiology Stepdown  Initial Discharge Assessment       Primary Care Provider: Bertha Lorenzo MD    Admission Diagnosis: Heart failure [I50.9]    Admission Date: 6/27/2023  Expected Discharge Date:     Transition of Care Barriers: Underinsured    Payor: MEDICAID / Plan: [facility] / Product Type: Managed Medicaid /     Extended Emergency Contact Information  Primary Emergency Contact: Jaye Baum  Address:  [address] [place] [address] [place] [address] [facility]  Home Phone: [phone]  Relation: Sister  Secondary Emergency Contact: Jeanie Jaimes  Mobile Phone: [phone]  Relation: Sister    Discharge Plan A: Home with family, Home Health  Discharge Plan B: Home with family      [id number] [place] - [address]  [address]  [place] [address]  Phone: [phone] Fax: [phone]    [facility] [address] [place] [address]  Phone: [phone] Fax: [phone]            CM met with patient and discussed the discharge process with her and gave her the discharge booklet and our contact numbers for the  and social workers. She asked if there was any referrals we could offer for her to find  help for her daughter with an infant child. Gave her the site [email or web address]. she stated she will give it ti her daughter. She lives in a [facility] with 5 steps to enter and she stated she lives with her  and 17 year old granddaughter. She denied coumadin use and is not on dialysis. She is on dobutamine iv and uses [facility] ([facility] -[phone]) and goes to [facility] for her labs and iv site care weekly per Ms. Claudette [phone]. She owns a nebulizer and has an oxygen concentrator and shower chair and rollator along with bsc  and cane. Will continue to monitor for discharge needs. Patient was not up to answering the readmission questions. Patient was teary eyed in the beginning but did calm down as we spoke.      Connie Valdez RN    [phone]

## 2023-06-28 NOTE — CONSULTS
"6/28/2023 3:00 PM   Hafsa Hawley   1965   [id number]      PSYCHIATRY CONSULT DEFERRED     Unable to complete consult because patient was not informed of consult and declined psychiatric evaluation and treatment. She was alert and oriented x3, able to discuss her illness with good insight, and provided rationale for not wanting to participate in psych eval. She was tearful and appeared frustrated with occasional eye-rolling. She states she was previously on psychotropics (Lexapro, Xanax, and Trazodone), but they were not beneficial to her. She declined speaking with Psychology as well. Patient stated that she didn't "see the point" of speaking with either team because it won't change her condition or her lack of treatment options. She was encouraged to communicate to her primary team if she should change her mind and wish to start psychotropics or speak with Psychology for supportive therapy.    Please contact ON CALL psychiatry service for any acute issues that may arise. Thank you.    Lolly Donis MD, MPH  Department of Psychiatry   [facility]  6/28/2023 3:00 PM    "

## 2023-06-28 NOTE — PT/OT/SLP EVAL
Occupational Therapy   Evaluation/Discharge    Name: Hafsa Hawley  MRN: [id number]  Admitting Diagnosis: ACC/AHA stage D systolic heart failure  Recent Surgery: * No surgery found *      Recommendations:     Discharge Recommendations: home  Discharge Equipment Recommendations:  none  Barriers to discharge:  None    Assessment:     Hafsa Hawley is a 57 y.o. female with a medical diagnosis of ACC/AHA stage D systolic heart failure.  At this time pt completing ADLs and functional at her baseline of independent. However, SPV is recommended d/t ongoing medical needs. Further skilled OT services not warranted at this time. OT to sign off.     Rehab Prognosis: Good; patient would benefit from acute skilled OT services to address these deficits and reach maximum level of function.       Subjective     Chief Complaint: none  Patient/Family Comments/goals: get better    Occupational Profile:  Living Environment: Pt lives w/ spouse in  w/ ramp to enter.  Previous level of function: independent w/ ADLs and functional mobility.   Equipment Used at Home: shower chair, cane, straight, oxygen, rollator, bedside commode  Assistance upon Discharge: Assist from  PRN    Pain/Comfort:  Pain Rating 1: 0/10  Pain Rating Post-Intervention 1: 0/10    Patients cultural, spiritual, Yarsani conflicts given the current situation: no    Objective:     Communicated with: NSG prior to session.  Patient found HOB elevated with telemetry upon OT entry to room.    General Precautions: Standard, fall  Orthopedic Precautions: N/A  Braces: N/A  Respiratory Status: Room air    Occupational Performance:    Bed Mobility:    Patient completed Rolling/Turning to Right with independence  Patient completed Supine to Sit with independence    Functional Mobility/Transfers:  Patient completed Sit <> Stand Transfer with independence  with  no assistive device   Functional Mobility: pt ambulated ~250ft in hallway w/ SBA and no AD    Activities of  Daily Living:  Lower Body Dressing: independence to don socks    Cognitive/Visual Perceptual:  Cognitive/Psychosocial Skills:     -       Oriented to: Person, Place, Time, and Situation   -       Follows Commands/attention:Follows multistep  commands    Physical Exam:  Upper Extremity Range of Motion:     -       Right Upper Extremity: WFL  -       Left Upper Extremity: WFL  Upper Extremity Strength:    -       Right Upper Extremity: WFL  -       Left Upper Extremity: WFL    AMPAC 6 Click ADL:  AMPAC Total Score: 24    Treatment & Education:  Role of OT, goals, POC    Patient left  sitting on couch  with all lines intact, call button in reach, and NSG notified    GOALS:   Multidisciplinary Problems       Occupational Therapy Goals       Not on file                    History:     Past Medical History:   Diagnosis Date    Anemia     Arthritis     Atrial fibrillation     OAC    Chronic respiratory failure with hypoxia, on home oxygen therapy     2L with activity, off at rest.  Per Pulm  no overt evidence of ILD or COPD on PFTs and CT to explain O2 needs.    CKD (chronic kidney disease), stage IV 05/08/2018    Congestive heart failure     s/p AICD placement,    Deep vein thrombosis     Depression     elevated bilirubin d/t Gilbert's syndrome     confirmed by [facility] genetic testing, evaluated by hepatology    Encounter for blood transfusion     GERD (gastroesophageal reflux disease)     Hypertension     Pheochromocytoma, malignant     Right kidney mass     Sleep apnea     Thalassemia trait, alpha     Thyroid disease     Type 2 diabetes mellitus with hyperglycemia, without long-term current use of insulin 08/13/2020         Past Surgical History:   Procedure Laterality Date    APPENDECTOMY      BONE MARROW BIOPSY      CARDIAC DEFIBRILLATOR PLACEMENT Left 12/2016    CARDIAC ELECTROPHYSIOLOGY MAPPING AND ABLATION      CARDIAC ELECTROPHYSIOLOGY MAPPING AND ABLATION      COLONOSCOPY N/A 5/6/2022    Procedure: COLONOSCOPY;   Surgeon: Arely Betancourt MD;  Location: [facility] ENDO (2ND FLR);  Service: Endoscopy;  Laterality: N/A;  heart transplant candidate/ EF 25% - 2nd floor/ defib - Biotronik - ERW  Eliquis - per Dr. Cortez with [facility], Pt ok to hold Eliquis x 2 days prior-see media tab-outside correspondence dated 12/30/21  - ERW  verbal instructions/portal instructions/email instructions - s    EYE SURGERY      due to running tears    FRACTURE SURGERY Left     hand 5th digit    HYSTERECTOMY      KNEE SURGERY Left 2016    hematoma    LIVER BIOPSY  10/24/2018    Minimal steatosis, predominantly macrovesicular, 1%, Minimal nonspecific portal inflammation, no fibrosis. No findings on biopsy to explain elevated bilirubin levels. Could be d/t Gilbert's =?- hemolysis    RIGHT HEART CATHETERIZATION Right 12/07/2021    Procedure: INSERTION, CATHETER, RIGHT HEART;  Surgeon: Irving Cardenas MD;  Location: [facility] CATH LAB;  Service: Cardiology;  Laterality: Right;    RIGHT HEART CATHETERIZATION Right 12/19/2022    Procedure: INSERTION, CATHETER, RIGHT HEART;  Surgeon: Burke Camilo MD;  Location: [facility] CATH LAB;  Service: Cardiology;  Laterality: Right;    RIGHT HEART CATHETERIZATION Right 3/29/2023    Procedure: INSERTION, CATHETER, RIGHT HEART;  Surgeon: Katie Liriano DO;  Location: [facility] CATH LAB;  Service: Cardiology;  Laterality: Right;    TRANSJUGULAR BIOPSY OF LIVER N/A 10/24/2018    Procedure: BIOPSY, LIVER, TRANSJUGULAR APPROACH;  Surgeon: Carmen Diagnostic Provider;  Location: [facility] OR 2ND FLR;  Service: Radiology;  Laterality: N/A;       Time Tracking:     OT Date of Treatment: 06/28/23  OT Start Time: 1635  OT Stop Time: 1658  OT Total Time (min): 23 min    Billable Minutes:Evaluation 10  Therapeutic Activity 13    6/28/2023

## 2023-06-28 NOTE — ASSESSMENT & PLAN NOTE
AF History: PAF  PVI/cryo: No  Meds: Amiodarone and eliquis   EF: NYHA IV NICM, EF 15%  MELISSA: Likely                   Currently in SR    Plan:  - Cont Amiodarone  - will hold  Anticoagulation with Eliquis and start heparin drip w/ no bolus  - Continuous telemetry  - Maintain K > 4, Mg > 2, Ca wnl

## 2023-06-28 NOTE — ASSESSMENT & PLAN NOTE
With recent fall with associated L shoulder pain and cervical pain.     -F/u cervical and shoulder XR  - PRN home [place]

## 2023-06-28 NOTE — PROGRESS NOTES
[facility] Stepdown  Cardiology  Progress Note    Patient Name: Hafsa Hawley  MRN: [id number]  Admission Date: 6/27/2023  Hospital Length of Stay: 1 days  Code Status: Full Code   Attending Physician: Arie Rodriguez MD   Primary Care Physician: Bertha Lorenzo MD  Expected Discharge Date:   Principal Problem:ACC/AHA stage D systolic heart failure    Subjective:     Hospital Course:   While admitted patient CR improved with diuresis and DBM infusions. Psychiatry and palliative consulted for coping with her poor prognosis after patient became tearful and withdrawn.       Interval History: NAEON. Patient diuresing well with increased lasix gtt to 20mg/hour. Cr improving with diuresis and DBM. Will plan to transition to home lasix tomorrow as she appears more euvolemic.     Review of Systems   Constitutional: Positive for malaise/fatigue. Negative for chills, diaphoresis, fever, weight gain and weight loss.   HENT:  Negative for congestion, hearing loss, nosebleeds, sore throat and tinnitus.    Eyes:  Negative for visual disturbance.   Cardiovascular:  Positive for dyspnea on exertion and orthopnea. Negative for chest pain, leg swelling, near-syncope, palpitations, paroxysmal nocturnal dyspnea and syncope.   Respiratory:  Negative for cough, hemoptysis, sputum production and wheezing.    Endocrine: Negative for cold intolerance, heat intolerance and polyuria.   Skin:  Negative for nail changes and rash.   Musculoskeletal:  Negative for back pain, falls, joint pain, muscle cramps and muscle weakness.   Gastrointestinal:  Negative for abdominal pain, constipation, diarrhea, heartburn, hematemesis, hematochezia, melena, nausea and vomiting.   Genitourinary:  Negative for dysuria and hematuria.   Neurological:  Negative for excessive daytime sleepiness, dizziness and light-headedness.   Objective:     Vital Signs (Most Recent):  Temp: 98.1 °F (36.7 °C) (06/28/23 0842)  Pulse: 80 (06/28/23 1135)  Resp: 17  (06/28/23 0842)  BP: 108/61 (06/28/23 0842)  SpO2: (!) 91 % (06/28/23 0842) Vital Signs (24h Range):  Temp:  [97.2 °F (36.2 °C)-99 °F (37.2 °C)] 98.1 °F (36.7 °C)  Pulse:  [64-92] 80  Resp:  [15-24] 17  SpO2:  [91 %-99 %] 91 %  BP: (108-147)/(61-87) 108/61     Weight: 81.8 kg (180 lb 3.6 oz)  Body mass index is 29.09 kg/m².     SpO2: (!) 91 %         Intake/Output Summary (Last 24 hours) at 6/28/2023 1216  Last data filed at 6/28/2023 0827  Gross per 24 hour   Intake 480 ml   Output 1300 ml   Net -820 ml       Lines/Drains/Airways       Peripherally Inserted Central Catheter Line  Duration             PICC Double Lumen 05/12/23 1534 right brachial 46 days                       Physical Exam  Vitals and nursing note reviewed.   Constitutional:       General: She is not in acute distress.     Appearance: Normal appearance. She is not ill-appearing, toxic-appearing or diaphoretic.   HENT:      Head: Normocephalic and atraumatic.      Nose: Nose normal. No congestion.      Mouth/Throat:      Mouth: Mucous membranes are moist.      Pharynx: No oropharyngeal exudate.   Eyes:      Extraocular Movements: Extraocular movements intact.      Conjunctiva/sclera: Conjunctivae normal.   Cardiovascular:      Rate and Rhythm: Normal rate and regular rhythm.      Pulses: Normal pulses.      Heart sounds:     No friction rub. No gallop.   Pulmonary:      Effort: Pulmonary effort is normal.      Breath sounds: Rales present.   Abdominal:      General: Abdomen is flat. There is no distension.      Palpations: Abdomen is soft.   Musculoskeletal:         General: Tenderness present. No swelling. Normal range of motion.      Cervical back: Normal range of motion and neck supple. Tenderness present.   Skin:     General: Skin is warm and dry.      Capillary Refill: Capillary refill takes less than 2 seconds.   Neurological:      General: No focal deficit present.      Mental Status: She is alert and oriented to person, place, and time.    Psychiatric:         Mood and Affect: Mood normal.         Thought Content: Thought content normal.          Hemodynamics: 0738  CVP 13  SVO2 57  CO 6.1  CI 3.1   [id number]  On Dobutamine 5mcg  Lasix gtt 20/hour    Assessment and Plan:       * ACC/AHA stage D systolic heart failure  NYHA IV 2/2 NICM  On palliative dobutamine   Not candidate for advanced options      Echo 3/26/23  · The left ventricle is severely enlarged with eccentric hypertrophy and severely decreased systolic function. The estimated ejection fraction is 15%.  · Normal right ventricular size with moderately reduced right ventricular systolic function.  · Grade II left ventricular diastolic dysfunction.  · Biatrial enlargement.  · Mild mitral regurgitation.  · Small pericardial effusion.  · The estimated PA systolic pressure is 51 mmHg (by tricuspid regurgitation velocity). The estimated mean PA pressure is 39mm Hg.  · Intermediate central venous pressure (8 mmHg).       Home GDMT/Diuretic:  - Beta Blocker: None, on ionotropes  - ACEi/ARB/ARNi: Entresto 50mg  - MRA: Held for renal function  - SGLT2: Empagliflozin  - Diuretic: PO Bumex 2mg    BNP  Recent Labs   Lab 06/27/23  1334   *     Cr 2.9    Plan:  - Continue home Dobutamine 5 mcg/kg/min  - Initiate/Continue indicated GDMT as tolerated  - Daily weights  - Strict I/Os  - Fluid restriction at 1500mL  - lasix gtt at 20/hr, plan to transition to home regimen tomorrow  - Place urinary catheter for accurate I & Os  - Cardiac diet  - Maintain on telemetry and daily EKGs, maintain Mag >2 & K+ >4  - lactic WNL  - will repeat an echo in the am  - Will monitor CVP and SVO2      Chest wall tenderness  With recent fall with associated L shoulder pain and cervical pain.     -F/u cervical and shoulder XR  - PRN home [place]    Paroxysmal A-fib  AF History: PAF  PVI/cryo: No  Meds: Amiodarone and eliquis   EF: NYHA IV NICM, EF 15%  MELISSA: Likely                   Currently in SR    Plan:  - Cont  Amiodarone  - will hold  Anticoagulation with Eliquis and start heparin drip w/ no bolus  - Continuous telemetry  - Maintain K > 4, Mg > 2, Ca wnl          NSVT (nonsustained ventricular tachycardia)  AICD in place, no reported shocks    Continue Home Amiodarone    CKD (chronic kidney disease), stage IV        Restrictive lung disease  - incentive spirometer,nebs as needed    Acute kidney injury superimposed on chronic kidney disease  Baseline Cr 2-2.5, Cr 2.9 on admission  Etiology Diabetic nephropathy vs hypertensive nephropathy  Associated proteinuria: yes  No indications for HD at this time    Plan:  - Continue diuresis  - Trend Cr  - Strict I&Os  - Avoid nephrotoxic agents  - Renally adjust medications      NSTEMI (non-ST elevated myocardial infarction)  Baseline elevated troponin. Likely In the setting of demand ischemia and renal failure. Peaked trop 1.443. Chest pain exacerbated by movement and palpation.         Type 2 diabetes mellitus with stage 4 chronic kidney disease, with long-term current use of insulin  While inpatient will keep on ISS        elevated bilirubin d/t Gilbert's syndrome  -No acute changes, stable    Microcytic anemia  No signs of acute bleeding. Continuing iron supplement        VTE Risk Mitigation (From admission, onward)         Ordered     heparin 25,000 units in dextrose 5% (100 units/ml) IV bolus from bag - ADDITIONAL PRN BOLUS - 60 units/kg  As needed (PRN)        Question:  Heparin Infusion Adjustment (DO NOT MODIFY ANSWER)  Answer:  [email or web address]\Images\Pharmacy\HeparinInfusions\heparin LOW INTENSITY nomogram for [facility] [id number]    06/28/23 0437     heparin 25,000 units in dextrose 5% (100 units/ml) IV bolus from bag - ADDITIONAL PRN BOLUS - 30 units/kg  As needed (PRN)        Question:  Heparin Infusion Adjustment (DO NOT MODIFY ANSWER)  Answer:  \\[email or web address]\ep[employer]\Images\Pharmacy\HeparinInfusions\heparin LOW INTENSITY nomogram for [facility] [id number]    06/28/23 0917      heparin 25,000 units in dextrose 5% 250 mL (100 units/mL) infusion LOW INTENSITY nomogram - [facility]  Continuous        Question Answer Comment   Heparin Infusion Adjustment (DO NOT MODIFY ANSWER) [email or web address]\[email or web address]\Images\Pharmacy\HeparinInfusions\heparin LOW INTENSITY nomogram for [facility] [id number]    Begin at (in units/kg/hr) 12        06/28/23 0437     IP VTE HIGH RISK PATIENT  Once         06/27/23 235     Place sequential compression device  Until discontinued         06/27/23 2906                Barak Burnett,   Cardiology  [facility] - Cardiology Stepdown

## 2023-06-28 NOTE — H&P
[facility] - Cardiology Stepdown  Cardiology  History and Physical     Patient Name: Hafsa Hawley  MRN: [id number]  Admission Date: 6/27/2023  Code Status: Full Code   Attending Provider: Arie Rodriguez MD   Primary Care Physician: Bertha Lorenzo MD  Principal Problem:ACC/AHA stage D systolic heart failure    Patient information was obtained from patient and ER records.     Subjective:     Chief Complaint:  Chest and back discomfort     HPI:  Ms. Hafsa Hawley is a 57 year old woman with combined systolic/diastolic HF(LVEF 15%) s/p ICD on palliative dobutamine, PAF on eliquis, CKD4, DM2, HTN, HLD, Gilbert's syndrome, who presented as a transfer from [facility] for decompensated heart failure and WASHINGTON on CKD.     Of note she was recently admitted to [facility] [place] [facility] on the hospital medicine service for WASHINGTON, and at the time her Bumex was decreased from BID to daily. She was evaluated by Advanced HF team and deemed not a candidate for advanced options due to progressive renal dysfunction. She was discharged home on palliative dobutmine as well as adjusted GDMT (entresto, jardiance and Bidil). Her renal function at the time stabilized and on DC her Cr was 2.4.     She presented this time to [facility] with generalized fatigue and epigastric pain. She has had near daily vomiting. Patient fell at home as well as she was too weak. Has left sided chest pain, shoulder pain, back pain and hip pain. She has chronic LAGUERRE, orthopnea. Routine labs checked earlier in the day and given worsening Cr 2.7 was told to present to the ED by Dr Alisa Camilo. At OSH her Cr on repeat was 2.9. At [facility] Nephrology services were not available thus patient was transferred to [facility] for further workup.       Past Medical History:   Diagnosis Date    Anemia     Arthritis     Atrial fibrillation     OAC    Chronic respiratory failure with hypoxia, on home oxygen therapy     2L with activity, off at rest.  Per Pulm  no overt evidence of ILD or COPD on PFTs and  CT to explain O2 needs.    CKD (chronic kidney disease), stage IV 05/08/2018    Congestive heart failure     s/p AICD placement,    Deep vein thrombosis     Depression     elevated bilirubin d/t Gilbert's syndrome     confirmed by [place] genetic testing, evaluated by hepatology    Encounter for blood transfusion     GERD (gastroesophageal reflux disease)     Hypertension     Pheochromocytoma, malignant     Right kidney mass     Sleep apnea     Thalassemia trait, alpha     Thyroid disease     Type 2 diabetes mellitus with hyperglycemia, without long-term current use of insulin 08/13/2020       Past Surgical History:   Procedure Laterality Date    APPENDECTOMY      BONE MARROW BIOPSY      CARDIAC DEFIBRILLATOR PLACEMENT Left 12/2016    CARDIAC ELECTROPHYSIOLOGY MAPPING AND ABLATION      CARDIAC ELECTROPHYSIOLOGY MAPPING AND ABLATION      COLONOSCOPY N/A 5/6/2022    Procedure: COLONOSCOPY;  Surgeon: Arely Betancourt MD;  Location: [facility] ENDO ([address] [place] [address]);  Service: Endoscopy;  Laterality: N/A;  heart transplant candidate/ EF 25% - 2nd floor/ defib - Biotronik - ERW  Eliquis - per Dr. Cortez with [facility], Pt ok to hold Eliquis x 2 days prior-see media tab-outside correspondence dated 12/30/21  - ER[facility]  verbal instructions/portal instructions/email instructions - s    EYE SURGERY      due to running tears    FRACTURE SURGERY Left     hand 5th digit    HYSTERECTOMY      KNEE SURGERY Left 2016    hematoma    LIVER BIOPSY  10/24/2018    Minimal steatosis, predominantly macrovesicular, 1%, Minimal nonspecific portal inflammation, no fibrosis. No findings on biopsy to explain elevated bilirubin levels. Could be d/t Gilbert's =?- hemolysis    RIGHT HEART CATHETERIZATION Right 12/07/2021    Procedure: INSERTION, CATHETER, RIGHT HEART;  Surgeon: Irving Cardenas MD;  Location: [facility] CATH LAB;  Service: Cardiology;  Laterality: Right;    RIGHT HEART CATHETERIZATION Right 12/19/2022    Procedure: INSERTION,  "CATHETER, RIGHT HEART;  Surgeon: Burke Camilo MD;  Location: [facility] CATH LAB;  Service: Cardiology;  Laterality: Right;    RIGHT HEART CATHETERIZATION Right 3/29/2023    Procedure: INSERTION, CATHETER, RIGHT HEART;  Surgeon: Katie Liriano DO;  Location: [facility] CATH LAB;  Service: Cardiology;  Laterality: Right;    TRANSJUGULAR BIOPSY OF LIVER N/A 10/24/2018    Procedure: BIOPSY, LIVER, TRANSJUGULAR APPROACH;  Surgeon: [facility] Diagnostic Provider;  [facility];  Service: Radiology;  Laterality: N/A;       Review of patient's allergies indicates:   Allergen Reactions    Penicillins Hives and Other (See Comments)    Iodinated contrast media Nausea And Vomiting    Oxycodone-acetaminophen Other (See Comments)     Nausea, Dizziness, Anxiety.  "I don't like how it makes me feel."   Given Hydromorphone 0.5mg IVP  Without problems.  Other reaction(s): Other (See Comments)    Clonazepam Other (See Comments)    Diovan hct [valsartan-hydrochlorothiazide] Other (See Comments)     Causes coughing    Iodine Other (See Comments)    Irinotecan      Pt has homozygosity for the TA7 promoter variant that places this individual at significantly increased risk for   severe neutropenia (grade 4) when treated with the standard dose of irinotecan (risk approximately 50%).   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 Metabolism of other drugs not listed here may also be impacted by UGT1A1 enzyme activity.       Tramadol Nausea And Vomiting and Other (See Comments)     Other reaction(s): Other (See Comments)    Valsartan Other (See Comments)       Current Facility-Administered Medications on File Prior to Encounter   Medication    0.9%  NaCl infusion    vancomycin in dextrose 5 % 1 gram/250 mL IVPB 1,000 mg     Current Outpatient Medications on File Prior to Encounter   Medication Sig "    albuterol-ipratropium (DUO-NEB) 2.5 mg-0.5 mg/3 mL nebulizer solution Take 3 mLs by nebulization 2 (two) times a day.    allopurinoL (ZYLOPRIM) 100 MG tablet Take 1 tablet (100 mg total) by mouth daily as needed (gout attack).    ALPRAZolam (XANAX) 2 MG Tab ALPRAZolam 2 mg tablet, [RxNorm: 670772]    amiodarone (PACERONE) 200 MG Tab Take 200 mg by mouth.    atorvastatin (LIPITOR) 40 MG tablet Take 1 tablet (40 mg total) by mouth every evening.    b complex vitamins tablet Take 1 tablet by mouth once daily.    bumetanide (BUMEX) 2 MG tablet Take 1 tablet (2 mg total) by mouth once daily. (Patient taking differently: Take 4 mg by mouth once daily. 4 mg in morning and 2 mg at night)    dobutamine HCl (DOBUTAMINE IV) Inject into the vein.    ELIQUIS 5 mg Tab TAKE 1 TABLET (5 MG TOTAL) BY MOUTH 2 (TWO) TIMES DAILY.    empagliflozin (JARDIANCE) 10 mg tablet Take 1 tablet (10 mg total) by mouth once daily.    EScitalopram oxalate (LEXAPRO) 10 MG tablet Take 1 tablet (10 mg total) by mouth once daily.    ferrous sulfate (FEOSOL) 325 mg (65 mg iron) Tab tablet TAKE 1 TABLET ORALLY 2 TIMES A DAY AFTER FOOD.    fluticasone propionate (FLONASE) 50 mcg/actuation nasal spray 2 sprays by Each Nostril route daily as needed for Rhinitis.     isosorbide-hydrALAZINE 20-37.5 mg (BIDIL) 20-37.5 mg Tab Take 1 tablet by mouth 3 (three) times daily.    ondansetron (ZOFRAN-ODT) 8 MG TbDL Take 1 tablet (8 mg total) by mouth every 12 (twelve) hours as needed (nausea).    pantoprazole (PROTONIX) 40 MG tablet TAKE 1 TABLET BY MOUTH DAILY IN THE MORNING    sacubitriL-valsartan (ENTRESTO) 24-26 mg per tablet Take 1 tablet by mouth 2 (two) times daily.    SPIRIVA WITH HANDIHALER 18 mcg inhalation capsule Inhale 1 capsule (18 mcg total) into the lungs once daily.    blood sugar diagnostic (ACCU-CHEK GUIDE TEST STRIPS) Strp 1 strip by Other route 3 (three) times daily.    blood-glucose sensor (DEXCOM G7 SENSOR) Evon 1 Device  by Misc.(Non-Drug; Combo Route) route every 10 days.    bumetanide (BUMEX) 2 MG tablet Take 2 mg by mouth every evening.    cetirizine (ZYRTEC) 10 MG tablet Take 10 mg by mouth daily as needed for Allergies.    diclofenac sodium (VOLTAREN) 1 % Gel APPLY 2 GRAMS TOPICALLY 3 (THREE) TIMES DAILY AS NEEDED (PAIN).    ergocalciferol (ERGOCALCIFEROL) 50,000 unit Cap Take 1 capsule (50,000 Units total) by mouth every Saturday.    HYDROcodone-acetaminophen (NORCO) 7.5-325 mg per tablet Take 1 tablet by mouth every 8 (eight) hours as needed for Pain.    lancets (ACCU-CHEK SOFTCLIX LANCETS) Misc 1 Device by Misc.(Non-Drug; Combo Route) route 3 (three) times daily.    mometasone-formoterol (DULERA) 200-5 mcg/actuation inhaler Inhale 2 puffs into the lungs 2 (two) times daily. Controller    nitroGLYCERIN (NITROSTAT) 0.4 MG SL tablet Nitrostat 0.4 mg sublingual tablet, [RxNorm: 893064]    ondansetron (ZOFRAN) 4 MG tablet ondansetron HCL 4 mg tablet, [RxNorm: 146793]    pregabalin (LYRICA) 50 MG capsule Take 1 capsule (50 mg total) by mouth 2 (two) times daily.    rOPINIRole (REQUIP) 4 MG tablet TAKE 1 TABLET (4 MG TOTAL) BY MOUTH ONCE DAILY. PT TAKING 4MG DAILY    semaglutide (OZEMPIC) 0.25 mg or 0.5 mg (2 mg/3 mL) pen injector Inject 0.5 mg into the skin every 7 days.    traZODone (DESYREL) 50 MG tablet Take 25 mg by mouth nightly as needed.    VENTOLIN HFA 90 mcg/actuation inhaler Inhale 2 puffs into the lungs every 6 (six) hours as needed for Shortness of Breath or Wheezing.     Family History       Problem Relation (Age of Onset)    Cancer Mother    Cirrhosis Brother    Diabetes Brother    Heart attack Father    Heart disease Father, Sister, Brother, Sister, Brother    Hypertension Father, Brother          Tobacco Use    Smoking status: Never    Smokeless tobacco: Never   Substance and Sexual Activity    Alcohol use: Yes     Comment: up to 1 yr ago drank 2-3 drinks on occasion but sporadic    Drug use: No     Sexual activity: Yes     Partners: Male     ROS  Review of Systems   Constitutional:  Negative for fever.   HENT:  Negative for sore throat.    Respiratory:  Negative for shortness of breath.    Cardiovascular:  Positive for chest pain   Gastrointestinal:  Positive for abdominal pain, nausea and vomiting. Negative for diarrhea.   Genitourinary:  Negative for dysuria.   Musculoskeletal:  positive for back pain.   Skin:  Negative for rash.   Neurological:  Negative for weakness.   Hematological:  Does not bruise/bleed easily.     Objective:     Vital Signs (Most Recent):  Temp: 97.2 °F (36.2 °C) (06/27/23 2300)  Pulse: 68 (06/28/23 0321)  Resp: 17 (06/28/23 0111)  BP: 134/64 (06/27/23 2300)  SpO2: (!) 94 % (06/28/23 0111) Vital Signs (24h Range):  Temp:  [97.2 °F (36.2 °C)-99 °F (37.2 °C)] 97.2 °F (36.2 °C)  Pulse:  [64-89] 68  Resp:  [15-24] 17  SpO2:  [93 %-99 %] 94 %  BP: (116-147)/(64-87) 134/64     Weight: 81.8 kg (180 lb 3.6 oz)  Body mass index is 29.09 kg/m².    SpO2: (!) 94 %       No intake or output data in the 24 hours ending 06/28/23 0421    Lines/Drains/Airways       Peripherally Inserted Central Catheter Line  Duration             PICC Double Lumen 05/12/23 1534 right brachial 46 days                     Physical Exam  Vitals and nursing note reviewed.   Constitutional:       General: She is not in acute distress.     Appearance: Normal appearance. She is not ill-appearing, toxic-appearing or diaphoretic.   HENT:      Head: Normocephalic and atraumatic.      Nose: Nose normal. No congestion.      Mouth/Throat:      Mouth: Mucous membranes are moist.      Pharynx: No oropharyngeal exudate.   Eyes:      Extraocular Movements: Extraocular movements intact.      Conjunctiva/sclera: Conjunctivae normal.   Cardiovascular:      Rate and Rhythm: Normal rate and regular rhythm.      Pulses: Normal pulses.      Heart sounds:     No friction rub.   Pulmonary:      Effort: Pulmonary effort is normal.       Breath sounds: Rales present.   Abdominal:      General: Abdomen is flat. There is no distension.      Palpations: Abdomen is soft.   Musculoskeletal:         General: Tenderness present. No swelling. Normal range of motion.      Cervical back: Normal range of motion and neck supple. Tenderness present.   Skin:     General: Skin is warm and dry.      Capillary Refill: Capillary refill takes less than 2 seconds.   Neurological:      General: No focal deficit present.      Mental Status: She is alert and oriented to person, place, and time.   Psychiatric:         Mood and Affect: Mood normal.         Thought Content: Thought content normal.        Significant Labs:   Recent Lab Results         06/28/23  0013   06/27/23  1936   06/27/23  1401   06/27/23  1334        Albumin 3.7       4.1       Alkaline Phosphatase 53       63       ALT 11       14       Anion Gap 9       14       Aniso Moderate       Marked       Appearance, UA     Clear         AST 20       20       Bacteria, UA     Few         Baso # 0.02       0.02       Basophilic Stippling Occasional             Basophil % 0.4       0.4       Bilirubin (UA)     Negative         BILIRUBIN TOTAL 1.6  Comment: For infants and newborns, interpretation of results should be based  on gestational age, weight and in agreement with clinical  observations.    Premature Infant recommended reference ranges:  Up to 24 hours.............<8.0 mg/dL  Up to 48 hours............<12.0 mg/dL  3-5 days..................<15.0 mg/dL  6-29 days.................<15.0 mg/dL         1.7  Comment: For infants and newborns, interpretation of results should be based  on gestational age, weight and in agreement with clinical  observations.    Premature Infant recommended reference ranges:  Up to 24 hours.............<8.0 mg/dL  Up to 48 hours............<12.0 mg/dL  3-5 days..................<15.0 mg/dL  6-29 days.................<15.0 mg/dL         BNP       794  Comment: Values of less than  100 pg/ml are consistent with non-CHF populations.       BUN 45       44       Calcium 9.6       9.7       Chloride 105       104       CO2 28       25       Color, UA     Yellow         Creatinine 2.6       2.9       Differential Method Automated       Automated       Dohle Bodies Present             eGFR 20.9       18       Eos # 0.1       0.1       Eosinophil % 1.7       2.0       Fragmented Cells Moderate       Moderate       Glucose 85       89       Glucose, UA     1+         Gran # (ANC) 2.9       3.0       Gran % 62.2       66.9       Hematocrit 29.1       32.5       Hemoglobin 9.0       9.6       Hyaline Casts, UA     44         Hypo Moderate       Occasional       Immature Grans (Abs) 0.03  Comment: Mild elevation in immature granulocytes is non specific and   can be seen in a variety of conditions including stress response,   acute inflammation, trauma and pregnancy. Correlation with other   laboratory and clinical findings is essential.         0.02  Comment: Mild elevation in immature granulocytes is non specific and   can be seen in a variety of conditions including stress response,   acute inflammation, trauma and pregnancy. Correlation with other   laboratory and clinical findings is essential.         Immature Granulocytes 0.6       0.4       Ketones, UA     Negative         Lactate, Ajay 0.9  Comment: Falsely low lactic acid results can be found in samples   containing >=13.0 mg/dL total bilirubin and/or >=3.5 mg/dL   direct bilirubin.               Leukocytes, UA     Negative         Lipase       119       Lymph # 1.0       0.9       Lymph % 21.8       19.8       Magnesium 1.9             MCH 18.0       16.8       MCHC 30.9       29.5       MCV 58       57       Microscopic Comment     SEE COMMENT  Comment: Other formed elements not mentioned in the report are not   present in the microscopic examination.            Mono # 0.6       0.5       Mono % 13.3       10.5       MPV SEE COMMENT  Comment:  Result not available.       SEE COMMENT  Comment: Result not available.       NITRITE UA     Negative         nRBC 1       1       Occult Blood UA     Trace         Ovalocytes Moderate       Occasional       pH, UA     6.0         Phosphorus 4.1             Platelet Estimate Appears normal       Appears normal       Platelets 163       182       POCT Glucose   144           Poikilocytosis Moderate       Moderate       Poly Occasional       Occasional       Potassium 4.2       4.0       PROTEIN TOTAL 6.7       7.3       Protein, UA     2+  Comment: Recommend a 24 hour urine protein or a urine   protein/creatinine ratio if globulin induced proteinuria is  clinically suspected.           RBC 4.99       5.70       RBC, UA     2         RDW 26.5       28.9       Schistocytes Present             Sodium 142       143       Specific Mildred, UA     1.010         Specimen UA     Urine, Clean Catch         Spherocytes Occasional             Stomatocytes Present             Target Cells Moderate       Moderate       Teardrop Cells Moderate       Moderate       Toxic Granulation Present             Troponin I 1.228  Comment: The reference interval for Troponin I represents the 99th percentile   cutoff   for our facility and is consistent with 3rd generation assay   performance.         1.443  Comment: The reference interval for Troponin I represents the 99th percentile   cutoff   for our facility and is consistent with 3rd generation assay   performance.         UROBILINOGEN UA     Negative         Vacuolated Granulocytes Present             WBC, UA     3         WBC 4.67       4.55                   Assessment and Plan:     * ACC/AHA stage D systolic heart failure  NYHA IV 2/2 NICM  On palliative dobutamine   Not candidate for advanced options      Echo 3/26/23  · The left ventricle is severely enlarged with eccentric hypertrophy and severely decreased systolic function. The estimated ejection fraction is 15%.  · Normal right  ventricular size with moderately reduced right ventricular systolic function.  · Grade II left ventricular diastolic dysfunction.  · Biatrial enlargement.  · Mild mitral regurgitation.  · Small pericardial effusion.  · The estimated PA systolic pressure is 51 mmHg (by tricuspid regurgitation velocity). The estimated mean PA pressure is 39mm Hg.  · Intermediate central venous pressure (8 mmHg).       Home GDMT/Diuretic:  - Beta Blocker: None, on ionotropes  - ACEi/ARB/ARNi: Entresto 50mg  - MRA: Held for renal function  - SGLT2: Empagliflozin  - Diuretic: PO Bumex 2mg    BNP  Recent Labs   Lab 06/27/23  1334   *     Cr 2.9    Plan:  - Continue home Dobutamine 5 mcg/kg/min  - Initiate/Continue indicated GDMT as tolerated  - Daily weights  - Strict I/Os  - Fluid restriction at 1500mL  - lasix 80mg Iv given and will likely start drip if needed  - Place urinary catheter for accurate I & Os  - Cardiac diet  - Maintain on telemetry and daily EKGs, maintain Mag >2 & K+ >4  - lactic WNL  - will repeat an echo in the am  - Will monitor CVP and SVO2      Chest wall tenderness  With recent fall with associated L shoulder pain and cervical pain.     -F/u cervical and shoulder XR  - PRN home norco    Paroxysmal A-fib  AF History: PAF  PVI/cryo: No  Meds: Amiodarone and eliquis   EF: NYHA IV NICM, EF 15%  MELISSA: Likely                   Currently in SR    Plan:  - Cont Amiodarone  - will hold  Anticoagulation with Eliquis and start heparin drip w/ no bolus  - Continuous telemetry  - Maintain K > 4, Mg > 2, Ca wnl          NSVT (nonsustained ventricular tachycardia)  AICD in place, no reported shocks    Restrictive lung disease  - incentive spirometer,nebs as needed    Acute kidney injury superimposed on chronic kidney disease  CKD4.Referred to the ED after worsening renal function.      No indications for HD at this time    Plan:  - monitoring renal function  - will consult nephrology in the am  - Strict I&Os  - Avoid  nephrotoxic agents  - Renally adjust medications    NSTEMI (non-ST elevated myocardial infarction)  Likely In the setting of demand ischemia. Peaked trop 1.443. Chest pain exacerbated by movement and palpation.         Type 2 diabetes mellitus with stage 4 chronic kidney disease, with long-term current use of insulin  While inpatient will keep on ISS        elevated bilirubin d/t Gilbert's syndrome  -No acute changes, stable    Microcytic anemia  No signs of acute bleeding. Continuing iron supplement        VTE Risk Mitigation (From admission, onward)         Ordered     heparin 25,000 units in dextrose 5% (100 units/ml) IV bolus from bag - ADDITIONAL PRN BOLUS - 60 units/kg  As needed (PRN)        Question:  Heparin Infusion Adjustment (DO NOT MODIFY ANSWER)  Answer:  [email or web address]\Images\Pharmacy\HeparinInfusions\heparin LOW INTENSITY nomogram for [facility] [id number]    06/28/23 0437     heparin 25,000 units in dextrose 5% (100 units/ml) IV bolus from bag - ADDITIONAL PRN BOLUS - 30 units/kg  As needed (PRN)        Question:  Heparin Infusion Adjustment (DO NOT MODIFY ANSWER)  Answer:  [email or web address]\Images\Pharmacy\HeparinInfusions\heparin LOW INTENSITY nomogram for [facility] [id number]    06/28/23 0437     heparin 25,000 units in dextrose 5% 250 mL (100 units/mL) infusion LOW INTENSITY nomogram - [facility]  Continuous        Question Answer Comment   Heparin Infusion Adjustment (DO NOT MODIFY ANSWER) [email or web address]\Images\Pharmacy\HeparinInfusions\heparin LOW INTENSITY nomogram for [facility] [id number]    Begin at (in units/kg/hr) 12        06/28/23 0437     IP VTE HIGH RISK PATIENT  Once         06/27/23 2355     Place sequential compression device  Until discontinued         06/27/23 2355                Lei Lopez MD  Cardiology   [facility] - Cardiology Stepdown

## 2023-06-28 NOTE — ASSESSMENT & PLAN NOTE
Baseline elevated troponin. Likely In the setting of demand ischemia and renal failure. Peaked trop 1.443. Chest pain exacerbated by movement and palpation.

## 2023-06-28 NOTE — ASSESSMENT & PLAN NOTE
CKD4.Referred to the ED after worsening renal function.      No indications for HD at this time    Plan:  - monitoring renal function  - will consult nephrology in the am  - Strict I&Os  - Avoid nephrotoxic agents  - Renally adjust medications

## 2023-06-28 NOTE — PLAN OF CARE
Attempted to assess patient at this time and she was in bed, teary eyed and asked if I could come back later . I offered her tissue and told her I will come back . Charge nurse aware .     .tm

## 2023-06-28 NOTE — ASSESSMENT & PLAN NOTE
Baseline Cr 2-2.5, Cr 2.9 on admission  Etiology Diabetic nephropathy vs hypertensive nephropathy  Associated proteinuria: yes  No indications for HD at this time    Plan:  - Continue diuresis  - Trend Cr  - Strict I&Os  - Avoid nephrotoxic agents  - Renally adjust medications

## 2023-06-28 NOTE — SUBJECTIVE & OBJECTIVE
Past Medical History:   Diagnosis Date    Anemia     Arthritis     Atrial fibrillation     OAC    Chronic respiratory failure with hypoxia, on home oxygen therapy     2L with activity, off at rest.  Per Pulm  no overt evidence of ILD or COPD on PFTs and CT to explain O2 needs.    CKD (chronic kidney disease), stage IV 05/08/2018    Congestive heart failure     s/p AICD placement,    Deep vein thrombosis     Depression     elevated bilirubin d/t Gilbert's syndrome     confirmed by [place] genetic testing, evaluated by hepatology    Encounter for blood transfusion     GERD (gastroesophageal reflux disease)     Hypertension     Pheochromocytoma, malignant     Right kidney mass     Sleep apnea     Thalassemia trait, alpha     Thyroid disease     Type 2 diabetes mellitus with hyperglycemia, without long-term current use of insulin 08/13/2020       Past Surgical History:   Procedure Laterality Date    APPENDECTOMY      BONE MARROW BIOPSY      CARDIAC DEFIBRILLATOR PLACEMENT Left 12/2016    CARDIAC ELECTROPHYSIOLOGY MAPPING AND ABLATION      CARDIAC ELECTROPHYSIOLOGY MAPPING AND ABLATION      COLONOSCOPY N/A 5/6/2022    Procedure: COLONOSCOPY;  Surgeon: Arely Betancourt MD;  Location: [facility] ([address] [place] [address]);  Service: Endoscopy;  Laterality: N/A;  heart transplant candidate/ EF 25% - 2nd floor/ defib - Biotronik - ERW  Eliquis - per Dr. Cortez with [facility] [place], Pt ok to hold Eliquis x 2 days prior-see media tab-outside correspondence dated 12/30/21  - ERW  verbal instructions/portal instructions/email instructions - s    EYE SURGERY      due to running tears    FRACTURE SURGERY Left     hand 5th digit    HYSTERECTOMY      KNEE SURGERY Left 2016    hematoma    LIVER BIOPSY  10/24/2018    Minimal steatosis, predominantly macrovesicular, 1%, Minimal nonspecific portal inflammation, no fibrosis. No findings on biopsy to explain elevated bilirubin levels. Could be d/t Gilbert's =?- hemolysis    RIGHT HEART CATHETERIZATION Right  "12/07/2021    Procedure: INSERTION, CATHETER, RIGHT HEART;  Surgeon: Irving Cardenas MD;  Location: [facility] CATH LAB;  Service: Cardiology;  Laterality: Right;    RIGHT HEART CATHETERIZATION Right 12/19/2022    Procedure: INSERTION, CATHETER, RIGHT HEART;  Surgeon: Burke Camilo MD;  Location: [facility] CATH LAB;  Service: Cardiology;  Laterality: Right;    RIGHT HEART CATHETERIZATION Right 3/29/2023    Procedure: INSERTION, CATHETER, RIGHT HEART;  Surgeon: Katie Liriano DO;  Location: [facility] CATH LAB;  Service: Cardiology;  Laterality: Right;    TRANSJUGULAR BIOPSY OF LIVER N/A 10/24/2018    Procedure: BIOPSY, LIVER, TRANSJUGULAR APPROACH;  Surgeon: [facility] Diagnostic Provider;  Location: [facility] OR [address] [place] [address];  Service: Radiology;  Laterality: N/A;       Review of patient's allergies indicates:   Allergen Reactions    Penicillins Hives and Other (See Comments)    Iodinated contrast media Nausea And Vomiting    Oxycodone-acetaminophen Other (See Comments)     Nausea, Dizziness, Anxiety.  "I don't like how it makes me feel."   Given Hydromorphone 0.5mg IVP  Without problems.  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 Metabolism of other drugs not listed here may also be impacted by UGT1A1 enzyme activity.       Tramadol Nausea And Vomiting and Other (See Comments)     Other reaction(s): Other (See Comments)    Valsartan Other (See Comments)       Current " Facility-Administered Medications on File Prior to Encounter   Medication    0.9%  NaCl infusion    vancomycin in dextrose 5 % 1 gram/250 mL IVPB 1,000 mg     Current Outpatient Medications on File Prior to Encounter   Medication Sig    albuterol-ipratropium (DUO-NEB) 2.5 mg-0.5 mg/3 mL nebulizer solution Take 3 mLs by nebulization 2 (two) times a day.    allopurinoL (ZYLOPRIM) 100 MG tablet Take 1 tablet (100 mg total) by mouth daily as needed (gout attack).    ALPRAZolam (XANAX) 2 MG Tab ALPRAZolam 2 mg tablet, [RxNorm: 418701]    amiodarone (PACERONE) 200 MG Tab Take 200 mg by mouth.    atorvastatin (LIPITOR) 40 MG tablet Take 1 tablet (40 mg total) by mouth every evening.    b complex vitamins tablet Take 1 tablet by mouth once daily.    bumetanide (BUMEX) 2 MG tablet Take 1 tablet (2 mg total) by mouth once daily. (Patient taking differently: Take 4 mg by mouth once daily. 4 mg in morning and 2 mg at night)    dobutamine HCl (DOBUTAMINE IV) Inject into the vein.    ELIQUIS 5 mg Tab TAKE 1 TABLET (5 MG TOTAL) BY MOUTH 2 (TWO) TIMES DAILY.    empagliflozin (JARDIANCE) 10 mg tablet Take 1 tablet (10 mg total) by mouth once daily.    EScitalopram oxalate (LEXAPRO) 10 MG tablet Take 1 tablet (10 mg total) by mouth once daily.    ferrous sulfate (FEOSOL) 325 mg (65 mg iron) Tab tablet TAKE 1 TABLET ORALLY 2 TIMES A DAY AFTER FOOD.    fluticasone propionate (FLONASE) 50 mcg/actuation nasal spray 2 sprays by Each Nostril route daily as needed for Rhinitis.     isosorbide-hydrALAZINE 20-37.5 mg (BIDIL) 20-37.5 mg Tab Take 1 tablet by mouth 3 (three) times daily.    ondansetron (ZOFRAN-ODT) 8 MG TbDL Take 1 tablet (8 mg total) by mouth every 12 (twelve) hours as needed (nausea).    pantoprazole (PROTONIX) 40 MG tablet TAKE 1 TABLET BY MOUTH DAILY IN THE MORNING    sacubitriL-valsartan (ENTRESTO) 24-26 mg per tablet Take 1 tablet by mouth 2 (two) times daily.    SPIRIVA WITH HANDIHALER 18 mcg inhalation capsule Inhale 1  capsule (18 mcg total) into the lungs once daily.    blood sugar diagnostic (ACCU-CHEK GUIDE TEST STRIPS) Strp 1 strip by Other route 3 (three) times daily.    blood-glucose sensor (DEXCOM G7 SENSOR) Evon 1 Device by Misc.(Non-Drug; Combo Route) route every 10 days.    bumetanide (BUMEX) 2 MG tablet Take 2 mg by mouth every evening.    cetirizine (ZYRTEC) 10 MG tablet Take 10 mg by mouth daily as needed for Allergies.    diclofenac sodium (VOLTAREN) 1 % Gel APPLY 2 GRAMS TOPICALLY 3 (THREE) TIMES DAILY AS NEEDED (PAIN).    ergocalciferol (ERGOCALCIFEROL) 50,000 unit Cap Take 1 capsule (50,000 Units total) by mouth every Saturday.    HYDROcodone-acetaminophen (NORCO) 7.5-325 mg per tablet Take 1 tablet by mouth every 8 (eight) hours as needed for Pain.    lancets (ACCU-CHEK SOFTCLIX LANCETS) Misc 1 Device by Misc.(Non-Drug; Combo Route) route 3 (three) times daily.    mometasone-formoterol (DULERA) 200-5 mcg/actuation inhaler Inhale 2 puffs into the lungs 2 (two) times daily. Controller    nitroGLYCERIN (NITROSTAT) 0.4 MG SL tablet Nitrostat 0.4 mg sublingual tablet, [RxNorm: 360642]    ondansetron (ZOFRAN) 4 MG tablet ondansetron HCL 4 mg tablet, [RxNorm: 417314]    pregabalin (LYRICA) 50 MG capsule Take 1 capsule (50 mg total) by mouth 2 (two) times daily.    rOPINIRole (REQUIP) 4 MG tablet TAKE 1 TABLET (4 MG TOTAL) BY MOUTH ONCE DAILY. PT TAKING 4MG DAILY    semaglutide (OZEMPIC) 0.25 mg or 0.5 mg (2 mg/3 mL) pen injector Inject 0.5 mg into the skin every 7 days.    traZODone (DESYREL) 50 MG tablet Take 25 mg by mouth nightly as needed.    VENTOLIN HFA 90 mcg/actuation inhaler Inhale 2 puffs into the lungs every 6 (six) hours as needed for Shortness of Breath or Wheezing.     Family History       Problem Relation (Age of Onset)    Cancer Mother    Cirrhosis Brother    Diabetes Brother    Heart attack Father    Heart disease Father, Sister, Brother, Sister, Brother    Hypertension Father, Brother          Tobacco  Use    Smoking status: Never    Smokeless tobacco: Never   Substance and Sexual Activity    Alcohol use: Yes     Comment: up to 1 yr ago drank 2-3 drinks on occasion but sporadic    Drug use: No    Sexual activity: Yes     Partners: Male     ROS  Review of Systems   Constitutional:  Negative for fever.   HENT:  Negative for sore throat.    Respiratory:  Negative for shortness of breath.    Cardiovascular:  Positive for chest pain   Gastrointestinal:  Positive for abdominal pain, nausea and vomiting. Negative for diarrhea.   Genitourinary:  Negative for dysuria.   Musculoskeletal:  positive for back pain.   Skin:  Negative for rash.   Neurological:  Negative for weakness.   Hematological:  Does not bruise/bleed easily.     Objective:     Vital Signs (Most Recent):  Temp: 97.2 °F (36.2 °C) (06/27/23 2300)  Pulse: 68 (06/28/23 0321)  Resp: 17 (06/28/23 0111)  BP: 134/64 (06/27/23 2300)  SpO2: (!) 94 % (06/28/23 0111) Vital Signs (24h Range):  Temp:  [97.2 °F (36.2 °C)-99 °F (37.2 °C)] 97.2 °F (36.2 °C)  Pulse:  [64-89] 68  Resp:  [15-24] 17  SpO2:  [93 %-99 %] 94 %  BP: (116-147)/(64-87) 134/64     Weight: 81.8 kg (180 lb 3.6 oz)  Body mass index is 29.09 kg/m².    SpO2: (!) 94 %       No intake or output data in the 24 hours ending 06/28/23 0421    Lines/Drains/Airways       Peripherally Inserted Central Catheter Line  Duration             PICC Double Lumen 05/12/23 1534 right brachial 46 days                     Physical Exam  Vitals and nursing note reviewed.   Constitutional:       General: She is not in acute distress.     Appearance: Normal appearance. She is not ill-appearing, toxic-appearing or diaphoretic.   HENT:      Head: Normocephalic and atraumatic.      Nose: Nose normal. No congestion.      Mouth/Throat:      Mouth: Mucous membranes are moist.      Pharynx: No oropharyngeal exudate.   Eyes:      Extraocular Movements: Extraocular movements intact.      Conjunctiva/sclera: Conjunctivae normal.    Cardiovascular:      Rate and Rhythm: Normal rate and regular rhythm.      Pulses: Normal pulses.      Heart sounds:     No friction rub.   Pulmonary:      Effort: Pulmonary effort is normal.      Breath sounds: Rales present.   Abdominal:      General: Abdomen is flat. There is no distension.      Palpations: Abdomen is soft.   Musculoskeletal:         General: Tenderness present. No swelling. Normal range of motion.      Cervical back: Normal range of motion and neck supple. Tenderness present.   Skin:     General: Skin is warm and dry.      Capillary Refill: Capillary refill takes less than 2 seconds.   Neurological:      General: No focal deficit present.      Mental Status: She is alert and oriented to person, place, and time.   Psychiatric:         Mood and Affect: Mood normal.         Thought Content: Thought content normal.        Significant Labs:   Recent Lab Results         06/28/23  0013   06/27/23  1936   06/27/23  1401   06/27/23  1334        Albumin 3.7       4.1       Alkaline Phosphatase 53       63       ALT 11       14       Anion Gap 9       14       Aniso Moderate       Marked       Appearance, UA     Clear         AST 20       20       Bacteria, UA     Few         Baso # 0.02       0.02       Basophilic Stippling Occasional             Basophil % 0.4       0.4       Bilirubin (UA)     Negative         BILIRUBIN TOTAL 1.6  Comment: For infants and newborns, interpretation of results should be based  on gestational age, weight and in agreement with clinical  observations.    Premature Infant recommended reference ranges:  Up to 24 hours.............<8.0 mg/dL  Up to 48 hours............<12.0 mg/dL  3-5 days..................<15.0 mg/dL  6-29 days.................<15.0 mg/dL         1.7  Comment: For infants and newborns, interpretation of results should be based  on gestational age, weight and in agreement with clinical  observations.    Premature Infant recommended reference ranges:  Up to 24  hours.............<8.0 mg/dL  Up to 48 hours............<12.0 mg/dL  3-5 days..................<15.0 mg/dL  6-29 days.................<15.0 mg/dL         BNP       794  Comment: Values of less than 100 pg/ml are consistent with non-CHF populations.       BUN 45       44       Calcium 9.6       9.7       Chloride 105       104       CO2 28       25       Color, UA     Yellow         Creatinine 2.6       2.9       Differential Method Automated       Automated       Dohle Bodies Present             eGFR 20.9       18       Eos # 0.1       0.1       Eosinophil % 1.7       2.0       Fragmented Cells Moderate       Moderate       Glucose 85       89       Glucose, UA     1+         Gran # (ANC) 2.9       3.0       Gran % 62.2       66.9       Hematocrit 29.1       32.5       Hemoglobin 9.0       9.6       Hyaline Casts, UA     44         Hypo Moderate       Occasional       Immature Grans (Abs) 0.03  Comment: Mild elevation in immature granulocytes is non specific and   can be seen in a variety of conditions including stress response,   acute inflammation, trauma and pregnancy. Correlation with other   laboratory and clinical findings is essential.         0.02  Comment: Mild elevation in immature granulocytes is non specific and   can be seen in a variety of conditions including stress response,   acute inflammation, trauma and pregnancy. Correlation with other   laboratory and clinical findings is essential.         Immature Granulocytes 0.6       0.4       Ketones, UA     Negative         Lactate, Ajay 0.9  Comment: Falsely low lactic acid results can be found in samples   containing >=13.0 mg/dL total bilirubin and/or >=3.5 mg/dL   direct bilirubin.               Leukocytes, UA     Negative         Lipase       119       Lymph # 1.0       0.9       Lymph % 21.8       19.8       Magnesium 1.9             MCH 18.0       16.8       MCHC 30.9       29.5       MCV 58       57       Microscopic Comment     SEE  COMMENT  Comment: Other formed elements not mentioned in the report are not   present in the microscopic examination.            Mono # 0.6       0.5       Mono % 13.3       10.5       MPV SEE COMMENT  Comment: Result not available.       SEE COMMENT  Comment: Result not available.       NITRITE UA     Negative         nRBC 1       1       Occult Blood UA     Trace         Ovalocytes Moderate       Occasional       pH, UA     6.0         Phosphorus 4.1             Platelet Estimate Appears normal       Appears normal       Platelets 163       182       POCT Glucose   144           Poikilocytosis Moderate       Moderate       Poly Occasional       Occasional       Potassium 4.2       4.0       PROTEIN TOTAL 6.7       7.3       Protein, UA     2+  Comment: Recommend a 24 hour urine protein or a urine   protein/creatinine ratio if globulin induced proteinuria is  clinically suspected.           RBC 4.99       5.70       RBC, UA     2         RDW 26.5       28.9       Schistocytes Present             Sodium 142       143       Specific [place], UA     1.010         Specimen UA     Urine, Clean Catch         Spherocytes Occasional             Stomatocytes Present             Target Cells Moderate       Moderate       Teardrop Cells Moderate       Moderate       Toxic Granulation Present             Troponin I 1.228  Comment: The reference interval for Troponin I represents the 99th percentile   cutoff   for our facility and is consistent with 3rd generation assay   performance.         1.443  Comment: The reference interval for Troponin I represents the 99th percentile   cutoff   for our facility and is consistent with 3rd generation assay   performance.         UROBILINOGEN UA     Negative         Vacuolated Granulocytes Present             WBC, UA     3         WBC 4.67       4.55

## 2023-06-28 NOTE — HOSPITAL COURSE
While admitted patient CR improved with diuresis and DBM infusions. Psychiatry and palliative consulted for coping with her poor prognosis after patient became tearful and withdrawn. Patient was transitioned back to home Bumex. Renal function improved to near baseline. Entresto held with plan to transition to Valsartan outpatient in setting of worsening renal function. Referral to psych placed for outpatient. Resources for other centers with advanced HF services at patient request. Patient medically ready for discharge on 6/30 with increased home DBM infusion rate of 5mcg

## 2023-06-28 NOTE — ASSESSMENT & PLAN NOTE
Likely In the setting of demand ischemia. Peaked trop 1.443. Chest pain exacerbated by movement and palpation.

## 2023-06-28 NOTE — PLAN OF CARE
Problem: Adult Inpatient Plan of Care  Goal: Absence of Hospital-Acquired Illness or Injury  Outcome: Ongoing, Progressing  Intervention: Identify and Manage Fall Risk  Flowsheets (Taken 6/28/2023 1103)  Safety Promotion/Fall Prevention:   assistive device/personal item within reach   bed alarm set   Fall Risk reviewed with patient/family   nonskid shoes/socks when out of bed   instructed to call staff for mobility  Intervention: Prevent Skin Injury  Flowsheets (Taken 6/28/2023 1103)  Body Position: position changed independently  Skin Protection:   adhesive use limited   incontinence pads utilized  Intervention: Prevent and Manage VTE (Venous Thromboembolism) Risk  Flowsheets (Taken 6/28/2023 1103)  Activity Management: Ambulated -L4  VTE Prevention/Management: ROM (active) performed  Range of Motion: active ROM (range of motion) encouraged  Intervention: Prevent Infection  Flowsheets (Taken 6/28/2023 1103)  Infection Prevention:   hand hygiene promoted   single patient room provided   rest/sleep promoted     Problem: Fluid and Electrolyte Imbalance (Acute Kidney Injury/Impairment)  Goal: Fluid and Electrolyte Balance  Outcome: Ongoing, Progressing  Intervention: Monitor and Manage Fluid and Electrolyte Balance  Flowsheets (Taken 6/28/2023 1103)  Fluid/Electrolyte Management: fluids provided     Problem: Renal Function Impairment (Acute Kidney Injury/Impairment)  Goal: Effective Renal Function  Outcome: Ongoing, Progressing  Intervention: Monitor and Support Renal Function  Flowsheets (Taken 6/28/2023 1103)  Medication Review/Management:   medications reviewed   dosing adjusted     Problem: Infection  Goal: Absence of Infection Signs and Symptoms  Outcome: Ongoing, Progressing  Intervention: Prevent or Manage Infection  Flowsheets (Taken 6/28/2023 1103)  Fever Reduction/Comfort Measures: lightweight clothing  Infection Management: aseptic technique maintained  Isolation Precautions: precautions maintained

## 2023-06-29 PROBLEM — R52 PAIN: Status: ACTIVE | Noted: 2023-06-29

## 2023-06-29 LAB
ANION GAP SERPL CALC-SCNC: 10 MMOL/L (ref 8–16)
ANION GAP SERPL CALC-SCNC: 11 MMOL/L (ref 8–16)
ANISOCYTOSIS BLD QL SMEAR: SLIGHT
BASOPHILS # BLD AUTO: 0.02 K/UL (ref 0–0.2)
BASOPHILS NFR BLD: 0.4 % (ref 0–1.9)
BUN SERPL-MCNC: 49 MG/DL (ref 6–20)
BUN SERPL-MCNC: 49 MG/DL (ref 6–20)
CALCIUM SERPL-MCNC: 9.2 MG/DL (ref 8.7–10.5)
CALCIUM SERPL-MCNC: 9.4 MG/DL (ref 8.7–10.5)
CHLORIDE SERPL-SCNC: 100 MMOL/L (ref 95–110)
CHLORIDE SERPL-SCNC: 101 MMOL/L (ref 95–110)
CO2 SERPL-SCNC: 28 MMOL/L (ref 23–29)
CO2 SERPL-SCNC: 28 MMOL/L (ref 23–29)
CREAT SERPL-MCNC: 3.1 MG/DL (ref 0.5–1.4)
CREAT SERPL-MCNC: 3.1 MG/DL (ref 0.5–1.4)
DIFFERENTIAL METHOD: ABNORMAL
EOSINOPHIL # BLD AUTO: 0.2 K/UL (ref 0–0.5)
EOSINOPHIL NFR BLD: 4.5 % (ref 0–8)
ERYTHROCYTE [DISTWIDTH] IN BLOOD BY AUTOMATED COUNT: 27.2 % (ref 11.5–14.5)
EST. GFR  (NO RACE VARIABLE): 16.9 ML/MIN/1.73 M^2
EST. GFR  (NO RACE VARIABLE): 16.9 ML/MIN/1.73 M^2
GLUCOSE SERPL-MCNC: 101 MG/DL (ref 70–110)
GLUCOSE SERPL-MCNC: 97 MG/DL (ref 70–110)
HCT VFR BLD AUTO: 29.3 % (ref 37–48.5)
HGB BLD-MCNC: 8.9 G/DL (ref 12–16)
HYPOCHROMIA BLD QL SMEAR: ABNORMAL
IMM GRANULOCYTES # BLD AUTO: 0.02 K/UL (ref 0–0.04)
IMM GRANULOCYTES NFR BLD AUTO: 0.4 % (ref 0–0.5)
LYMPHOCYTES # BLD AUTO: 0.9 K/UL (ref 1–4.8)
LYMPHOCYTES NFR BLD: 17.4 % (ref 18–48)
MAGNESIUM SERPL-MCNC: 2 MG/DL (ref 1.6–2.6)
MCH RBC QN AUTO: 17.9 PG (ref 27–31)
MCHC RBC AUTO-ENTMCNC: 30.4 G/DL (ref 32–36)
MCV RBC AUTO: 59 FL (ref 82–98)
MONOCYTES # BLD AUTO: 0.5 K/UL (ref 0.3–1)
MONOCYTES NFR BLD: 10.6 % (ref 4–15)
NEUTROPHILS # BLD AUTO: 3.3 K/UL (ref 1.8–7.7)
NEUTROPHILS NFR BLD: 66.7 % (ref 38–73)
NRBC BLD-RTO: 1 /100 WBC
OVALOCYTES BLD QL SMEAR: ABNORMAL
PHOSPHATE SERPL-MCNC: 4.7 MG/DL (ref 2.7–4.5)
PLATELET # BLD AUTO: 159 K/UL (ref 150–450)
PMV BLD AUTO: ABNORMAL FL (ref 9.2–12.9)
POCT GLUCOSE: 106 MG/DL (ref 70–110)
POCT GLUCOSE: 127 MG/DL (ref 70–110)
POCT GLUCOSE: 130 MG/DL (ref 70–110)
POCT GLUCOSE: 138 MG/DL (ref 70–110)
POIKILOCYTOSIS BLD QL SMEAR: ABNORMAL
POLYCHROMASIA BLD QL SMEAR: ABNORMAL
POTASSIUM SERPL-SCNC: 3.7 MMOL/L (ref 3.5–5.1)
POTASSIUM SERPL-SCNC: 4.1 MMOL/L (ref 3.5–5.1)
RBC # BLD AUTO: 4.96 M/UL (ref 4–5.4)
SCHISTOCYTES BLD QL SMEAR: ABNORMAL
SODIUM SERPL-SCNC: 138 MMOL/L (ref 136–145)
SODIUM SERPL-SCNC: 140 MMOL/L (ref 136–145)
SPHEROCYTES BLD QL SMEAR: ABNORMAL
TARGETS BLD QL SMEAR: ABNORMAL
WBC # BLD AUTO: 4.89 K/UL (ref 3.9–12.7)

## 2023-06-29 PROCEDURE — 25000003 PHARM REV CODE 250: Performed by: INTERNAL MEDICINE

## 2023-06-29 PROCEDURE — 83735 ASSAY OF MAGNESIUM: CPT

## 2023-06-29 PROCEDURE — 80048 BASIC METABOLIC PNL TOTAL CA: CPT | Performed by: HOSPITALIST

## 2023-06-29 PROCEDURE — 99223 1ST HOSP IP/OBS HIGH 75: CPT | Mod: ,,, | Performed by: CLINICAL NURSE SPECIALIST

## 2023-06-29 PROCEDURE — 63600175 PHARM REV CODE 636 W HCPCS: Performed by: HOSPITALIST

## 2023-06-29 PROCEDURE — 99233 SBSQ HOSP IP/OBS HIGH 50: CPT | Mod: ,,, | Performed by: INTERNAL MEDICINE

## 2023-06-29 PROCEDURE — 85025 COMPLETE CBC W/AUTO DIFF WBC: CPT

## 2023-06-29 PROCEDURE — 20600001 HC STEP DOWN PRIVATE ROOM

## 2023-06-29 PROCEDURE — 36415 COLL VENOUS BLD VENIPUNCTURE: CPT | Performed by: HOSPITALIST

## 2023-06-29 PROCEDURE — 36415 COLL VENOUS BLD VENIPUNCTURE: CPT

## 2023-06-29 PROCEDURE — 94761 N-INVAS EAR/PLS OXIMETRY MLT: CPT

## 2023-06-29 PROCEDURE — 99900035 HC TECH TIME PER 15 MIN (STAT)

## 2023-06-29 PROCEDURE — 63600175 PHARM REV CODE 636 W HCPCS: Performed by: INTERNAL MEDICINE

## 2023-06-29 PROCEDURE — 63600175 PHARM REV CODE 636 W HCPCS: Performed by: STUDENT IN AN ORGANIZED HEALTH CARE EDUCATION/TRAINING PROGRAM

## 2023-06-29 PROCEDURE — 25000003 PHARM REV CODE 250

## 2023-06-29 PROCEDURE — 80048 BASIC METABOLIC PNL TOTAL CA: CPT | Mod: 91 | Performed by: INTERNAL MEDICINE

## 2023-06-29 PROCEDURE — 25000003 PHARM REV CODE 250: Performed by: STUDENT IN AN ORGANIZED HEALTH CARE EDUCATION/TRAINING PROGRAM

## 2023-06-29 PROCEDURE — 99223 PR INITIAL HOSPITAL CARE,LEVL III: ICD-10-PCS | Mod: ,,, | Performed by: CLINICAL NURSE SPECIALIST

## 2023-06-29 PROCEDURE — 84100 ASSAY OF PHOSPHORUS: CPT

## 2023-06-29 PROCEDURE — 99233 PR SUBSEQUENT HOSPITAL CARE,LEVL III: ICD-10-PCS | Mod: ,,, | Performed by: INTERNAL MEDICINE

## 2023-06-29 RX ORDER — PROCHLORPERAZINE EDISYLATE 5 MG/ML
5 INJECTION INTRAMUSCULAR; INTRAVENOUS EVERY 6 HOURS PRN
Status: DISCONTINUED | OUTPATIENT
Start: 2023-06-29 | End: 2023-06-30

## 2023-06-29 RX ORDER — TORSEMIDE 20 MG/1
40 TABLET ORAL DAILY
Status: DISCONTINUED | OUTPATIENT
Start: 2023-06-30 | End: 2023-06-29

## 2023-06-29 RX ORDER — TORSEMIDE 20 MG/1
40 TABLET ORAL DAILY
Status: DISCONTINUED | OUTPATIENT
Start: 2023-06-29 | End: 2023-06-29

## 2023-06-29 RX ORDER — BUMETANIDE 1 MG/1
2 TABLET ORAL DAILY
Status: DISCONTINUED | OUTPATIENT
Start: 2023-06-30 | End: 2023-06-30 | Stop reason: HOSPADM

## 2023-06-29 RX ORDER — MUPIROCIN 20 MG/G
OINTMENT TOPICAL 2 TIMES DAILY
Status: DISCONTINUED | OUTPATIENT
Start: 2023-06-29 | End: 2023-06-30 | Stop reason: HOSPADM

## 2023-06-29 RX ORDER — PROCHLORPERAZINE EDISYLATE 5 MG/ML
5 INJECTION INTRAMUSCULAR; INTRAVENOUS EVERY 6 HOURS PRN
Status: DISCONTINUED | OUTPATIENT
Start: 2023-06-29 | End: 2023-06-29

## 2023-06-29 RX ORDER — BUMETANIDE 1 MG/1
4 TABLET ORAL DAILY
Status: DISCONTINUED | OUTPATIENT
Start: 2023-06-30 | End: 2023-06-30 | Stop reason: HOSPADM

## 2023-06-29 RX ADMIN — PROCHLORPERAZINE EDISYLATE 5 MG: 5 INJECTION INTRAMUSCULAR; INTRAVENOUS at 10:06

## 2023-06-29 RX ADMIN — MECLIZINE HYDROCHLORIDE 25 MG: 12.5 TABLET ORAL at 05:06

## 2023-06-29 RX ADMIN — APIXABAN 5 MG: 5 TABLET, FILM COATED ORAL at 08:06

## 2023-06-29 RX ADMIN — FUROSEMIDE 80 MG: 10 INJECTION, SOLUTION INTRAMUSCULAR; INTRAVENOUS at 09:06

## 2023-06-29 RX ADMIN — PREGABALIN 50 MG: 50 CAPSULE ORAL at 08:06

## 2023-06-29 RX ADMIN — PANTOPRAZOLE SODIUM 40 MG: 40 TABLET, DELAYED RELEASE ORAL at 09:06

## 2023-06-29 RX ADMIN — ATORVASTATIN CALCIUM 40 MG: 40 TABLET, FILM COATED ORAL at 08:06

## 2023-06-29 RX ADMIN — FERROUS SULFATE TAB 325 MG (65 MG ELEMENTAL FE) 1 EACH: 325 (65 FE) TAB at 09:06

## 2023-06-29 RX ADMIN — ISOSORBIDE DINITRATE: 20 TABLET ORAL at 09:06

## 2023-06-29 RX ADMIN — AMIODARONE HYDROCHLORIDE 200 MG: 200 TABLET ORAL at 09:06

## 2023-06-29 RX ADMIN — MUPIROCIN: 20 OINTMENT TOPICAL at 09:06

## 2023-06-29 RX ADMIN — PREGABALIN 50 MG: 50 CAPSULE ORAL at 09:06

## 2023-06-29 RX ADMIN — ISOSORBIDE DINITRATE: 20 TABLET ORAL at 02:06

## 2023-06-29 RX ADMIN — ISOSORBIDE DINITRATE: 20 TABLET ORAL at 08:06

## 2023-06-29 RX ADMIN — DOBUTAMINE IN DEXTROSE 5 MCG/KG/MIN: 400 INJECTION, SOLUTION INTRAVENOUS at 04:06

## 2023-06-29 RX ADMIN — ROPINIROLE HYDROCHLORIDE 4 MG: 1 TABLET, FILM COATED ORAL at 08:06

## 2023-06-29 RX ADMIN — APIXABAN 5 MG: 5 TABLET, FILM COATED ORAL at 09:06

## 2023-06-29 RX ADMIN — MUPIROCIN: 20 OINTMENT TOPICAL at 12:06

## 2023-06-29 NOTE — PT/OT/SLP PROGRESS
Physical Therapy      Patient Name:  Hafsa Hawley   MRN:  [id number]    Screen completed to assess for acute physical therapy needs at this time. Per chart review, pt evaluated by OT on 6/28/23 and demonstrated ability to perform bed mobility and transfers with independence, and ambulating 250 ft. using no AD. No apparent acute functional deficits or PT needs identified. Will sign off at this time.     6/29/2023

## 2023-06-29 NOTE — ASSESSMENT & PLAN NOTE
Impression: Pt is a 56 y/o female with advanced heart failure. Pt is on Palliative Dobutamine. Pt is not a candidate for advanced options. Pt is alert, oriented to person, place, and situation. Pt is a full code. Pt sees Dr. Michel in [facility] clinic.    Reason for consult: ACP. Communicated with team.     Goals of care/ACP:     Pt goals at this time are to continue medical management. Pt not ready to accept hospice care. Pt to f/u in [facility] clinic with Dr. Michel.   Pt is on palliative Dobutamine. Pt plans to continue at d/c. Per pt helps with her symptoms of heart failure.     Pt reports good support at home.     Code status: Full.    Symptom management:     Dyspnea r/t to Congestive heart failure, unspecified HF chronicity, unspecified heart failure type /  Acute decompensated heart failure / Pulmonary HTN  -NICM since 2013 HFrEF (EF 10%) s/p AICD placement  -Not a transplant candidate or advance options.   -recent admission for ADHF, now on palliative dobutamine.   Pt reports dyspnea has improved with Dobutamine.         Pain   -reports back pain that is relieved with opioids. Pt repots pain is stabbing and can be shooting.     Pt on Norco 7.5-325mg q 6 hrs prn.     Continue.         Depression/anxiety  -worsening depression  -struggling to accept her diagnosis and prognosis   - discussed   support   -referrals placed  Pt on Sertaline 25 mg daily.     Nausea  Pt on meclizine 25 mg tid prn.   Pt on  prochlorperazine 5 mg q 6 hrs prn.     Constipation  Pt on bowel regiment.   No c/o nausea.     Plan: Pt is f/u in [facility] clinic with Dr. Michel.

## 2023-06-29 NOTE — PROGRESS NOTES
[facility] Step[facility]  Cardiology  Progress Note    Patient Name: Hafsa Hawley  MRN: [id number]  Admission Date: 6/27/2023  Hospital Length of Stay: 2 days  Code Status: Full Code   Attending Physician: Arie Rodriguez MD   Primary Care Physician: Bertha Lorenzo MD  Expected Discharge Date: 6/30/2023  Principal Problem:ACC/AHA stage D systolic heart failure    Subjective:     Hospital Course:   While admitted patient CR improved with diuresis and DBM infusions. Psychiatry and palliative consulted for coping with her poor prognosis after patient became tearful and withdrawn.       Interval History: No events overnight.She diuresed well yesterday and net negative  1.3 lit. Cr trending up     ROS  Objective:     Vital Signs (Most Recent):  Temp: 98.2 °F (36.8 °C) (06/29/23 1206)  Pulse: 76 (06/29/23 1456)  Resp: 18 (06/29/23 1206)  BP: 126/72 (06/29/23 1450)  SpO2: 96 % (06/29/23 1206) Vital Signs (24h Range):  Temp:  [97.7 °F (36.5 °C)-98.4 °F (36.9 °C)] 98.2 °F (36.8 °C)  Pulse:  [66-86] 76  Resp:  [14-19] 18  SpO2:  [88 %-96 %] 96 %  BP: ()/(49-79) 126/72     Weight: 82.3 kg (181 lb 7 oz)  Body mass index is 29.28 kg/m².     SpO2: 96 %         Intake/Output Summary (Last 24 hours) at 6/29/2023 1532  Last data filed at 6/29/2023 0847  Gross per 24 hour   Intake 480 ml   Output 1100 ml   Net -620 ml       Lines/Drains/Airways       Peripherally Inserted Central Catheter Line  Duration             PICC Double Lumen 05/12/23 1534 right brachial 47 days              Peripheral Intravenous Line  Duration                  Peripheral IV - Single Lumen 06/28/23 20 G Left;Posterior;Proximal Forearm 1 day                       Physical Exam  Constitutional:       Appearance: Normal appearance.   HENT:      Head: Normocephalic.   Eyes:      Pupils: Pupils are equal, round, and reactive to light.   Cardiovascular:      Rate and Rhythm: Normal rate and regular rhythm.   Pulmonary:      Effort: Pulmonary  effort is normal.      Breath sounds: Normal breath sounds.   Abdominal:      General: Abdomen is flat. Bowel sounds are normal.      Palpations: Abdomen is soft.   Musculoskeletal:         General: Normal range of motion.      Cervical back: Normal range of motion.   Skin:     General: Skin is warm and dry.      Capillary Refill: Capillary refill takes less than 2 seconds.   Neurological:      General: No focal deficit present.      Mental Status: She is alert and oriented to person, place, and time.          Significant Labs: All pertinent lab results from the last 24 hours have been reviewed.        Assessment and Plan:          * ACC/AHA stage D systolic heart failure  NYHA IV 2/2 NICM  On palliative dobutamine   Not candidate for advanced options      Echo 3/26/23  · The left ventricle is severely enlarged with eccentric hypertrophy and severely decreased systolic function. The estimated ejection fraction is 15%.  · Normal right ventricular size with moderately reduced right ventricular systolic function.  · Grade II left ventricular diastolic dysfunction.  · Biatrial enlargement.  · Mild mitral regurgitation.  · Small pericardial effusion.  · The estimated PA systolic pressure is 51 mmHg (by tricuspid regurgitation velocity). The estimated mean PA pressure is 39mm Hg.  · Intermediate central venous pressure (8 mmHg).       Home GDMT/Diuretic:  - Beta Blocker: None, on ionotropes  - ACEi/ARB/ARNi: Entresto 50mg but on hold for [place]  - MRA: Held for renal function  - SGLT2: Empagliflozin- on hold       BNP  Recent Labs   Lab 06/27/23  1334   *     Cr3.1  CVP 10 this morning     Plan:  - Continue home Dobutamine 5 mcg/kg/min  -  Change IV lasix to  Bumex   - Recheck Creatinine tomorrow        Chest wall tenderness  With recent fall with associated L shoulder pain and cervical pain.     -F/u cervical and shoulder XR  - PRN home norco    Paroxysmal A-fib  AF History: PAF  PVI/cryo: No  Meds: Amiodarone and  eliquis   EF: NYHA IV NICM, EF 15%  MELISSA: Likely                   Currently in SR    Plan:  - Cont Amiodarone  - AC with Eliquis         NSVT (nonsustained ventricular tachycardia)  AICD in place, no reported shocks    Continue Home Amiodarone     Restrictive lung disease  - incentive spirometer,nebs as needed    Acute kidney injury superimposed on chronic kidney disease v   -Cr trending up with diuresis. Held lasix and restart Bumex form tomorrow morning   - Trend Cr  - Strict I&Os  - Avoid nephrotoxic agents  - Renally adjust medications      NSTEMI (non-ST elevated myocardial infarction)   Demand Ischemia       Type 2 diabetes mellitus with stage 4 chronic kidney disease, with long-term current use of insulin  While inpatient will keep on ISS        elevated bilirubin d/t Gilbert's syndrome  -No acute changes, stable    Microcytic anemia  No signs of acute bleeding. Continuing iron supplement        VTE Risk Mitigation (From admission, onward)         Ordered     apixaban tablet 5 mg  2 times daily         06/28/23 1459     IP VTE HIGH RISK PATIENT  Once         06/27/23 8290     Place sequential compression device  Until discontinued         06/27/23 5476                Conner Valencia MD  Cardiology  [facility]

## 2023-06-29 NOTE — SUBJECTIVE & OBJECTIVE
Interval History: No events overnight.She diuresed well yesterday and net negative  1.3 lit. Cr trending up     ROS  Objective:     Vital Signs (Most Recent):  Temp: 98.2 °F (36.8 °C) (06/29/23 1206)  Pulse: 76 (06/29/23 1456)  Resp: 18 (06/29/23 1206)  BP: 126/72 (06/29/23 1450)  SpO2: 96 % (06/29/23 1206) Vital Signs (24h Range):  Temp:  [97.7 °F (36.5 °C)-98.4 °F (36.9 °C)] 98.2 °F (36.8 °C)  Pulse:  [66-86] 76  Resp:  [14-19] 18  SpO2:  [88 %-96 %] 96 %  BP: ()/(49-79) 126/72     Weight: 82.3 kg (181 lb 7 oz)  Body mass index is 29.28 kg/m².     SpO2: 96 %         Intake/Output Summary (Last 24 hours) at 6/29/2023 1532  Last data filed at 6/29/2023 0847  Gross per 24 hour   Intake 480 ml   Output 1100 ml   Net -620 ml       Lines/Drains/Airways       Peripherally Inserted Central Catheter Line  Duration             PICC Double Lumen 05/12/23 1534 right brachial 47 days              Peripheral Intravenous Line  Duration                  Peripheral IV - Single Lumen 06/28/23 20 G Left;Posterior;Proximal Forearm 1 day                       Physical Exam  Constitutional:       Appearance: Normal appearance.   HENT:      Head: Normocephalic.   Eyes:      Pupils: Pupils are equal, round, and reactive to light.   Cardiovascular:      Rate and Rhythm: Normal rate and regular rhythm.   Pulmonary:      Effort: Pulmonary effort is normal.      Breath sounds: Normal breath sounds.   Abdominal:      General: Abdomen is flat. Bowel sounds are normal.      Palpations: Abdomen is soft.   Musculoskeletal:         General: Normal range of motion.      Cervical back: Normal range of motion.   Skin:     General: Skin is warm and dry.      Capillary Refill: Capillary refill takes less than 2 seconds.   Neurological:      General: No focal deficit present.      Mental Status: She is alert and oriented to person, place, and time.          Significant Labs: All pertinent lab results from the last 24 hours have been  reviewed.

## 2023-06-29 NOTE — HPI
Ms. Hafsa Hawley is a 57 year old woman with combined systolic/diastolic HF(LVEF 15%) s/p ICD on palliative dobutamine, PAF on eliquis, CKD4, DM2, HTN, HLD, Gilbert's syndrome, who presented as a transfer from [facility] for decompensated heart failure and [place] on CKD.      Per chart review:  Of note she was recently admitted to [facility] on the hospital medicine service for [place], and at the time her Bumex was decreased from BID to daily. She was evaluated by Advanced HF team and deemed not a candidate for advanced options due to progressive renal dysfunction. She was discharged home on palliative dobutmine as well as adjusted GDMT (entresto, jardiance and Bidil). Her renal function at the time stabilized and on DC her Cr was 2.4.      She presented to [facility] with generalized fatigue and epigastric pain. She has had near daily vomiting. Patient fell at home as well as she was too weak. Has left sided chest pain, shoulder pain, back pain and hip pain. She has chronic LAGUERRE, orthopnea. Routine labs checked earlier in the day and given worsening Cr 2.7 was told to present to the ED by Dr Alisa Camilo. At OSH her Cr on repeat was 2.9. At [facility] Nephrology services were not available thus patient was transferred to [facility] for further workup.

## 2023-06-29 NOTE — CONSULTS
[facility] - Cardiology Stepdown  Palliative Medicine  Consult Note    Patient Name: Hafsa Hawley  MRN: [id number]  Admission Date: 6/27/2023  Hospital Length of Stay: 2 days  Code Status: Full Code   Attending Provider: Arie Rodriguez MD  Consulting Provider: SURI Carrillo  Primary Care Physician: Bertha Lorenzo MD  Principal Problem:ACC/AHA stage D systolic heart failure    Patient information was obtained from patient and primary team.      Inpatient consult to Palliative Care  Consult performed by: SURI Rehman  Consult ordered by: Barak Burnett DO      Assessment/Plan:     Palliative Care  Palliative care encounter  Impression: Pt is a 58 y/o female with advanced heart failure. Pt is on Palliative Dobutamine. Pt is not a candidate for advanced options. Pt is alert, oriented to person, place, and situation. Pt is a full code. Pt sees Dr. Michel in [facility] care clinic.    Reason for consult: ACP. Communicated with team.     Goals of care/ACP:     Pt goals at this time are to continue medical management. Pt not ready to accept hospice care. Pt to f/u in [facility] clinic with Dr. Michel.   Pt is on palliative Dobutamine. Pt plans to continue at d/c. Per pt helps with her symptoms of heart failure.     Pt reports good support at home.     Code status: Full.    Symptom management:     Dyspnea r/t to Congestive heart failure, unspecified HF chronicity, unspecified heart failure type /  Acute decompensated heart failure / Pulmonary HTN  -NICM since 2013 HFrEF (EF 10%) s/p AICD placement  -Not a transplant candidate or advance options.   -recent admission for ADHF, now on palliative dobutamine.   Pt reports dyspnea has improved with Dobutamine.         Pain   -reports back pain that is relieved with opioids. Pt repots pain is stabbing and can be shooting.     Pt on Norco 7.5-325mg q 6 hrs prn.     Continue.         Depression/anxiety  -worsening depression  -struggling to accept her diagnosis and  prognosis   - discussed   support   -referrals placed  Pt on Sertaline 25 mg daily.     Nausea  Pt on meclizine 25 mg tid prn.   Pt on  prochlorperazine 5 mg q 6 hrs prn.     Constipation  Pt on bowel regiment.   No c/o nausea.     Plan: Pt is f/u in [facility] care clinic with Dr. Michel.            Thank you for your consult. I will follow-up with patient. Please contact us if you have any additional questions.    Subjective:     HPI:   Ms. Hafsa Hawley is a 57 year old woman with combined systolic/diastolic HF(LVEF 15%) s/p ICD on palliative dobutamine, PAF on eliquis, CKD4, DM2, HTN, HLD, Gilbert's syndrome, who presented as a transfer from [facility] for decompensated heart failure and [place] on CKD.      Per chart review:  Of note she was recently admitted to [facility] on the hospital medicine service for [place], and at the time her Bumex was decreased from BID to daily. She was evaluated by Advanced HF team and deemed not a candidate for advanced options due to progressive renal dysfunction. She was discharged home on palliative dobutmine as well as adjusted GDMT (entresto, jardiance and Bidil). Her renal function at the time stabilized and on DC her Cr was 2.4.      She presented to [facility] with generalized fatigue and epigastric pain. She has had near daily vomiting. Patient fell at home as well as she was too weak. Has left sided chest pain, shoulder pain, back pain and hip pain. She has chronic LAGUERRE, orthopnea. Routine labs checked earlier in the day and given worsening Cr 2.7 was told to present to the ED by Dr Alisa Camilo. At OSH her Cr on repeat was 2.9. At [facility] Nephrology services were not available thus patient was transferred to [facility] for further workup.       Hospital Course:  No notes on file    Interval History: PT to f/u in [facility] care clinic with Dr. Michel    Past Medical History:   Diagnosis Date    Anemia     Arthritis     Atrial fibrillation     OAC    Chronic respiratory failure with hypoxia, on home oxygen  therapy     2L with activity, off at rest.  Per Pulm  no overt evidence of ILD or COPD on PFTs and CT to explain O2 needs.    CKD (chronic kidney disease), stage IV 05/08/2018    Congestive heart failure     s/p AICD placement,    Deep vein thrombosis     Depression     elevated bilirubin d/t Gilbert's syndrome     confirmed by [facility] genetic testing, evaluated by hepatology    Encounter for blood transfusion     GERD (gastroesophageal reflux disease)     Hypertension     Pheochromocytoma, malignant     Right kidney mass     Sleep apnea     Thalassemia trait, alpha     Thyroid disease     Type 2 diabetes mellitus with hyperglycemia, without long-term current use of insulin 08/13/2020       Past Surgical History:   Procedure Laterality Date    APPENDECTOMY      BONE MARROW BIOPSY      CARDIAC DEFIBRILLATOR PLACEMENT Left 12/2016    CARDIAC ELECTROPHYSIOLOGY MAPPING AND ABLATION      CARDIAC ELECTROPHYSIOLOGY MAPPING AND ABLATION      COLONOSCOPY N/A 5/6/2022    Procedure: COLONOSCOPY;  Surgeon: Arely Betancourt MD;  Location: [facility] ENDO ([address] [place] [address]);  Service: Endoscopy;  Laterality: N/A;  heart transplant candidate/ EF 25% - 2nd floor/ defib - [facility]ron[facility] - ERW  Eliquis - per Dr. Cortez with [facility], Pt ok to hold Eliquis x 2 days prior-see media tab-outside correspondence dated 12/30/21  - ERW  verbal instructions/portal instructions/email instructions - s    EYE SURGERY      due to running tears    FRACTURE SURGERY Left     hand 5th digit    HYSTERECTOMY      KNEE SURGERY Left 2016    hematoma    LIVER BIOPSY  10/24/2018    Minimal steatosis, predominantly macrovesicular, 1%, Minimal nonspecific portal inflammation, no fibrosis. No findings on biopsy to explain elevated bilirubin levels. Could be d/t Gilbert's =?- hemolysis    RIGHT HEART CATHETERIZATION Right 12/07/2021    Procedure: INSERTION, CATHETER, RIGHT HEART;  Surgeon: Irving Cardenas MD;  Location: [facility] CATH LAB;  Service: Cardiology;  Laterality: Right;     "RIGHT HEART CATHETERIZATION Right 12/19/2022    Procedure: INSERTION, CATHETER, RIGHT HEART;  Surgeon: Burke Camilo MD;  Location: [facility] CATH LAB;  Service: Cardiology;  Laterality: Right;    RIGHT HEART CATHETERIZATION Right 3/29/2023    Procedure: INSERTION, CATHETER, RIGHT HEART;  Surgeon: Katie Liriano DO;  Location: [facility] CATH LAB;  Service: Cardiology;  Laterality: Right;    TRANSJUGULAR BIOPSY OF LIVER N/A 10/24/2018    Procedure: BIOPSY, LIVER, TRANSJUGULAR APPROACH;  Surgeon: [facility] Diagnostic Provider;  Location: [facility] OR [facility] F[facility];  Service: Radiology;  Laterality: N/A;       Review of patient's allergies indicates:   Allergen Reactions    Penicillins Hives and Other (See Comments)    Iodinated contrast media Nausea And Vomiting    Oxycodone-acetaminophen Other (See Comments)     Nausea, Dizziness, Anxiety.  "I don't like how it makes me feel."   Given Hydromorphone 0.5mg IVP  Without problems.  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 Metabolism of other drugs not listed here may also be impacted by UGT1A1 enzyme activity.       Tramadol Nausea And Vomiting and Other (See Comments)     Other reaction(s): Other (See Comments)    Valsartan Other (See Comments)       Medications:  Continuous Infusions:   DOBUTamine IV infusion (non-titrating) 5 mcg/kg/min (06/28/23 0220)     Scheduled Meds:   amiodarone  200 mg Oral Daily    apixaban  5 mg Oral BID "    atorvastatin  40 mg Oral QHS    ferrous sulfate  1 tablet Oral Daily    hydrALAZINE 10 mg 1 tablet, hydrALAZINE 25 mg 1 tablet, isorsorbide dinitrate 20 mg 1 tablet combination   Oral TID    mupirocin   Nasal BID    pantoprazole  40 mg Oral Daily    pregabalin  50 mg Oral BID    rOPINIRole  4 mg Oral QHS    sertraline  25 mg Oral Daily    [START ON 6/30/2023] torsemide  40 mg Oral Daily     PRN Meds:acetaminophen, albuterol, cetirizine, dextrose 10%, dextrose 10%, dextrose, dextrose, glucagon (human recombinant), HYDROcodone-acetaminophen, insulin aspart U-100, meclizine, nitroGLYCERIN, prochlorperazine, sodium chloride 0.9%    Family History       Problem Relation (Age of Onset)    Cancer Mother    Cirrhosis Brother    Diabetes Brother    Heart attack Father    Heart disease Father, Sister, Brother, Sister, Brother    Hypertension Father, Brother          Tobacco Use    Smoking status: Never    Smokeless tobacco: Never   Substance and Sexual Activity    Alcohol use: Yes     Comment: up to 1 yr ago drank 2-3 drinks on occasion but sporadic    Drug use: No    Sexual activity: Yes     Partners: Male       Review of Systems   HENT:  Positive for trouble swallowing.    Cardiovascular:  Negative for chest pain.   Gastrointestinal:  Positive for nausea.   Musculoskeletal:  Positive for back pain.   Skin: Negative.    Psychiatric/Behavioral: Negative.     Objective:     Vital Signs (Most Recent):  Temp: 97.7 °F (36.5 °C) (06/29/23 1109)  Pulse: 85 (06/29/23 1109)  Resp: 14 (06/29/23 1109)  BP: 122/67 (06/29/23 1109)  SpO2: 96 % (06/29/23 1109) Vital Signs (24h Range):  Temp:  [97.7 °F (36.5 °C)-98.4 °F (36.9 °C)] 97.7 °F (36.5 °C)  Pulse:  [66-93] 85  Resp:  [14-19] 14  SpO2:  [88 %-96 %] 96 %  BP: ()/(49-78) 122/67     Weight: 82.3 kg (181 lb 7 oz)  Body mass index is 29.28 kg/m².       Physical Exam  Constitutional:       Appearance: She is overweight.   HENT:      Head: Normocephalic and atraumatic.  "  Cardiovascular:      Comments: Pt on Dobutamine IV  Pulmonary:      Effort: No respiratory distress.   Musculoskeletal:      Comments: Normal ROM.    Skin:     General: Skin is warm and dry.   Neurological:      Mental Status: She is alert and oriented to person, place, and time.   Psychiatric:         Attention and Perception: Attention normal.         Mood and Affect: Mood normal.         Speech: Speech normal.         Behavior: Behavior is cooperative.          Review of Symptoms      Symptom Assessment (ESAS 0-10 Scale)  Pain:  3  Dyspnea:  0  Anxiety:  0  Nausea:  5  Depression:  0  Anorexia:  0  Fatigue:  0  Insomnia:  0  Restlessness:  0  Agitation:  0         Pain Assessment:    Location(s): back    Back       Location: lower        Quality: Sharp        Quantity: 3/10 in intensity        Chronicity: Onset 0 second(s) ago, stable        Aggravating Factors: None        Alleviating Factors: Opiates       Associated Symptoms: Nausea    Performance Status:  60    Living Arrangements:  Lives with family    Psychosocial/Cultural:   See Palliative Psychosocial Note: No  Lives with  and granddaughter. Pt lives in a mobile home.  Pt also has support from her two sisters Benoit Baum [phone], Jeanie Jaimes [phone]. Pt is mostly independent with ADL's, but has a RW, SC and home oxygen, which she states she uses "as needed".   dgtr Erika Estrada, 33, [place], son Miguel Baum, 35  **Primary  to Follow**  Palliative Care  Consult: No    Spiritual:  F - Damaris and Belief:  Shinto  A - Address in Care:  Will ask boston Velasco to see.       Advance Care Planning   Advance Directives:   Medical Power of : Yes    Agent's Name:  Erika Estrada    Decision Making:  Patient answered questions  Goals of Care: What is most important right now is to focus on curative/life-prolongation (regardless of treatment burdens), remaining as independent as possible, symptom/pain " control. Accordingly, we have decided that the best plan to meet the patient's goals includes continuing with treatment.       Significant Labs: All pertinent labs within the past 24 hours have been reviewed.  CBC:   Recent Labs   Lab 06/29/23 0442   WBC 4.89   HGB 8.9*   HCT 29.3*   MCV 59*        BMP:  Recent Labs   Lab 06/29/23 0442 06/29/23  0735   GLU  --  101   NA  --  140   K  --  3.7   CL  --  101   CO2  --  28   BUN  --  49*   CREATININE  --  3.1*   CALCIUM  --  9.4   MG 2.0  --      LFT:  Lab Results   Component Value Date    AST 20 06/28/2023    ALKPHOS 53 (L) 06/28/2023    BILITOT 1.6 (H) 06/28/2023     Albumin:   Albumin   Date Value Ref Range Status   06/28/2023 3.7 3.5 - 5.2 g/dL Final     Protein:   Total Protein   Date Value Ref Range Status   06/28/2023 6.7 6.0 - 8.4 g/dL Final     Lactic acid:   Lab Results   Component Value Date    LACTATE 0.9 06/28/2023    LACTATE 2.1 04/26/2023       Significant Imaging: I have reviewed all pertinent imaging results/findings within the past 24 hours.        I spent a total of 75 minutes on the day of the visit. This includes face to face time in discussion of goals of care, symptom assessment, coordination of care and emotional support.  This also includes non-face to face time preparing to see the patient (eg, review of tests/imaging), obtaining and/or reviewing separately obtained history, documenting clinical information in the electronic or other health record, independently interpreting results and communicating results to the patient/family/caregiver, or care coordinator.    Betty Maria, CNS  Palliative Medicine  [facility] - Cardiology Stepdown

## 2023-06-29 NOTE — SUBJECTIVE & OBJECTIVE
Interval History: PT to f/u in [facility] care clinic with Dr. Michel    Past Medical History:   Diagnosis Date    Anemia     Arthritis     Atrial fibrillation     OAC    Chronic respiratory failure with hypoxia, on home oxygen therapy     2L with activity, off at rest.  Per Pulm  no overt evidence of ILD or COPD on PFTs and CT to explain O2 needs.    CKD (chronic kidney disease), stage IV 05/08/2018    Congestive heart failure     s/p AICD placement,    Deep vein thrombosis     Depression     elevated bilirubin d/t Gilbert's syndrome     confirmed by [place] genetic testing, evaluated by hepatology    Encounter for blood transfusion     GERD (gastroesophageal reflux disease)     Hypertension     Pheochromocytoma, malignant     Right kidney mass     Sleep apnea     Thalassemia trait, alpha     Thyroid disease     Type 2 diabetes mellitus with hyperglycemia, without long-term current use of insulin 08/13/2020       Past Surgical History:   Procedure Laterality Date    APPENDECTOMY      BONE MARROW BIOPSY      CARDIAC DEFIBRILLATOR PLACEMENT Left 12/2016    CARDIAC ELECTROPHYSIOLOGY MAPPING AND ABLATION      CARDIAC ELECTROPHYSIOLOGY MAPPING AND ABLATION      COLONOSCOPY N/A 5/6/2022    Procedure: COLONOSCOPY;  Surgeon: Arely Betancourt MD;  Location: [facility] ([address] [place] [address]);  Service: Endoscopy;  Laterality: N/A;  heart transplant candidate/ EF 25% - 2nd floor/ defib - Biotronik - ERW  Eliquis - per Dr. Cortez with [facility], Pt ok to hold Eliquis x 2 days prior-see media tab-outside correspondence dated 12/30/21  - ERW  verbal instructions/portal instructions/email instructions - s    EYE SURGERY      due to running tears    FRACTURE SURGERY Left     hand 5th digit    HYSTERECTOMY      KNEE SURGERY Left 2016    hematoma    LIVER BIOPSY  10/24/2018    Minimal steatosis, predominantly macrovesicular, 1%, Minimal nonspecific portal inflammation, no fibrosis. No findings on biopsy to explain elevated bilirubin levels. Could be d/t  "Gilbert's =?- hemolysis    RIGHT HEART CATHETERIZATION Right 12/07/2021    Procedure: INSERTION, CATHETER, RIGHT HEART;  Surgeon: Irving Cardenas MD;  Location: [facility] CATH LAB;  Service: Cardiology;  Laterality: Right;    RIGHT HEART CATHETERIZATION Right 12/19/2022    Procedure: INSERTION, CATHETER, RIGHT HEART;  Surgeon: Burke Camilo MD;  Location: [facility] CATH LAB;  Service: Cardiology;  Laterality: Right;    RIGHT HEART CATHETERIZATION Right 3/29/2023    Procedure: INSERTION, CATHETER, RIGHT HEART;  Surgeon: Katie Liriano DO;  Location: [facility] CATH LAB;  Service: Cardiology;  Laterality: Right;    TRANSJUGULAR BIOPSY OF LIVER N/A 10/24/2018    Procedure: BIOPSY, LIVER, TRANSJUGULAR APPROACH;  Surgeon: Carmen Diagnostic Provider;  Location: [facility] OR [address] [place] [address];  Service: Radiology;  Laterality: N/A;       Review of patient's allergies indicates:   Allergen Reactions    Penicillins Hives and Other (See Comments)    Iodinated contrast media Nausea And Vomiting    Oxycodone-acetaminophen Other (See Comments)     Nausea, Dizziness, Anxiety.  "I don't like how it makes me feel."   Given Hydromorphone 0.5mg IVP  Without problems.  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 Metabolism of other drugs not listed here may also be impacted by UGT1A1 enzyme activity.       Tramadol Nausea And Vomiting and Other (See Comments)     Other reaction(s): Other (See Comments)    " Valsartan Other (See Comments)       Medications:  Continuous Infusions:   DOBUTamine IV infusion (non-titrating) 5 mcg/kg/min (06/28/23 0220)     Scheduled Meds:   amiodarone  200 mg Oral Daily    apixaban  5 mg Oral BID    atorvastatin  40 mg Oral QHS    ferrous sulfate  1 tablet Oral Daily    hydrALAZINE 10 mg 1 tablet, hydrALAZINE 25 mg 1 tablet, isorsorbide dinitrate 20 mg 1 tablet combination   Oral TID    mupirocin   Nasal BID    pantoprazole  40 mg Oral Daily    pregabalin  50 mg Oral BID    rOPINIRole  4 mg Oral QHS    sertraline  25 mg Oral Daily    [START ON 6/30/2023] torsemide  40 mg Oral Daily     PRN Meds:acetaminophen, albuterol, cetirizine, dextrose 10%, dextrose 10%, dextrose, dextrose, glucagon (human recombinant), HYDROcodone-acetaminophen, insulin aspart U-100, meclizine, nitroGLYCERIN, prochlorperazine, sodium chloride 0.9%    Family History       Problem Relation (Age of Onset)    Cancer Mother    Cirrhosis Brother    Diabetes Brother    Heart attack Father    Heart disease Father, Sister, Brother, Sister, Brother    Hypertension Father, Brother          Tobacco Use    Smoking status: Never    Smokeless tobacco: Never   Substance and Sexual Activity    Alcohol use: Yes     Comment: up to 1 yr ago drank 2-3 drinks on occasion but sporadic    Drug use: No    Sexual activity: Yes     Partners: Male       Review of Systems   HENT:  Positive for trouble swallowing.    Cardiovascular:  Negative for chest pain.   Gastrointestinal:  Positive for nausea.   Musculoskeletal:  Positive for back pain.   Skin: Negative.    Psychiatric/Behavioral: Negative.     Objective:     Vital Signs (Most Recent):  Temp: 97.7 °F (36.5 °C) (06/29/23 1109)  Pulse: 85 (06/29/23 1109)  Resp: 14 (06/29/23 1109)  BP: 122/67 (06/29/23 1109)  SpO2: 96 % (06/29/23 1109) Vital Signs (24h Range):  Temp:  [97.7 °F (36.5 °C)-98.4 °F (36.9 °C)] 97.7 °F (36.5 °C)  Pulse:  [66-93] 85  Resp:  [14-19] 14  SpO2:  [88 %-96 %] 96 %  BP:  "()/(49-78) 122/67     Weight: 82.3 kg (181 lb 7 oz)  Body mass index is 29.28 kg/m².       Physical Exam  Constitutional:       Appearance: She is overweight.   HENT:      Head: Normocephalic and atraumatic.   Cardiovascular:      Comments: Pt on Dobutamine IV  Pulmonary:      Effort: No respiratory distress.   Musculoskeletal:      Comments: Normal ROM.    Skin:     General: Skin is warm and dry.   Neurological:      Mental Status: She is alert and oriented to person, place, and time.   Psychiatric:         Attention and Perception: Attention normal.         Mood and Affect: Mood normal.         Speech: Speech normal.         Behavior: Behavior is cooperative.          Review of Symptoms      Symptom Assessment (ESAS 0-10 Scale)  Pain:  3  Dyspnea:  0  Anxiety:  0  Nausea:  5  Depression:  0  Anorexia:  0  Fatigue:  0  Insomnia:  0  Restlessness:  0  Agitation:  0         Pain Assessment:    Location(s): back    Back       Location: lower        Quality: Sharp        Quantity: 3/10 in intensity        Chronicity: Onset 0 second(s) ago, stable        Aggravating Factors: None        Alleviating Factors: Opiates       Associated Symptoms: Nausea    Performance Status:  60    Living Arrangements:  Lives with family    Psychosocial/Cultural:   See Palliative Psychosocial Note: No  Lives with  and granddaughter. Pt lives in a mobile home.  Pt also has support from her two sisters Benoit Baum [phone], Jeanie Jaimes [phone]. Pt is mostly independent with ADL's, but has a RW, SC and home oxygen, which she states she uses "as needed".   dgtr Erika Estrada, 33, [place], son Miguel Baum, 35  **Primary  to Follow**  Palliative Care  Consult: No    Spiritual:  F - Damaris and Belief:  Zoroastrian  A - Address in Care:  Will ask boston Velasco to see.       Advance Care Planning   Advance Directives:   Medical Power of : Yes    Agent's Name:  Erika Estrada    Decision " Making:  Patient answered questions  Goals of Care: What is most important right now is to focus on curative/life-prolongation (regardless of treatment burdens), remaining as independent as possible, symptom/pain control. Accordingly, we have decided that the best plan to meet the patient's goals includes continuing with treatment.       Significant Labs: All pertinent labs within the past 24 hours have been reviewed.  CBC:   Recent Labs   Lab 06/29/23 0442   WBC 4.89   HGB 8.9*   HCT 29.3*   MCV 59*        BMP:  Recent Labs   Lab 06/29/23 0442 06/29/23  0735   GLU  --  101   NA  --  140   K  --  3.7   CL  --  101   CO2  --  28   BUN  --  49*   CREATININE  --  3.1*   CALCIUM  --  9.4   MG 2.0  --      LFT:  Lab Results   Component Value Date    AST 20 06/28/2023    ALKPHOS 53 (L) 06/28/2023    BILITOT 1.6 (H) 06/28/2023     Albumin:   Albumin   Date Value Ref Range Status   06/28/2023 3.7 3.5 - 5.2 g/dL Final     Protein:   Total Protein   Date Value Ref Range Status   06/28/2023 6.7 6.0 - 8.4 g/dL Final     Lactic acid:   Lab Results   Component Value Date    LACTATE 0.9 06/28/2023    LACTATE 2.1 04/26/2023       Significant Imaging: I have reviewed all pertinent imaging results/findings within the past 24 hours.

## 2023-06-29 NOTE — CARE UPDATE
Hemodynamics     from Picc line  CVP  14  SVO2 45  CO 4.85  CI 2.49      Urine output 1L during day shift  Lasix 80mg IV TID   5    Plan:   -continue current management, given lasix dose at 7pm  -monitor for need to restart lasix gtt      Wayne Ochoa MD  Cardiovascular Disease PGY-4  [facility]

## 2023-06-30 VITALS
WEIGHT: 181.44 LBS | RESPIRATION RATE: 18 BRPM | HEIGHT: 66 IN | OXYGEN SATURATION: 96 % | SYSTOLIC BLOOD PRESSURE: 112 MMHG | BODY MASS INDEX: 29.16 KG/M2 | HEART RATE: 92 BPM | DIASTOLIC BLOOD PRESSURE: 67 MMHG | TEMPERATURE: 98 F

## 2023-06-30 LAB
ANION GAP SERPL CALC-SCNC: 10 MMOL/L (ref 8–16)
ANION GAP SERPL CALC-SCNC: 14 MMOL/L (ref 8–16)
ANISOCYTOSIS BLD QL SMEAR: SLIGHT
BASOPHILS # BLD AUTO: 0.02 K/UL (ref 0–0.2)
BASOPHILS NFR BLD: 0.4 % (ref 0–1.9)
BUN SERPL-MCNC: 49 MG/DL (ref 6–20)
BUN SERPL-MCNC: 49 MG/DL (ref 6–20)
CALCIUM SERPL-MCNC: 9.1 MG/DL (ref 8.7–10.5)
CALCIUM SERPL-MCNC: 9.4 MG/DL (ref 8.7–10.5)
CHLORIDE SERPL-SCNC: 101 MMOL/L (ref 95–110)
CHLORIDE SERPL-SCNC: 99 MMOL/L (ref 95–110)
CO2 SERPL-SCNC: 23 MMOL/L (ref 23–29)
CO2 SERPL-SCNC: 27 MMOL/L (ref 23–29)
CREAT SERPL-MCNC: 2.7 MG/DL (ref 0.5–1.4)
CREAT SERPL-MCNC: 2.7 MG/DL (ref 0.5–1.4)
DACRYOCYTES BLD QL SMEAR: ABNORMAL
DIFFERENTIAL METHOD: ABNORMAL
EOSINOPHIL # BLD AUTO: 0.3 K/UL (ref 0–0.5)
EOSINOPHIL NFR BLD: 6.4 % (ref 0–8)
ERYTHROCYTE [DISTWIDTH] IN BLOOD BY AUTOMATED COUNT: 27 % (ref 11.5–14.5)
EST. GFR  (NO RACE VARIABLE): 20 ML/MIN/1.73 M^2
EST. GFR  (NO RACE VARIABLE): 20 ML/MIN/1.73 M^2
GLUCOSE SERPL-MCNC: 125 MG/DL (ref 70–110)
GLUCOSE SERPL-MCNC: 70 MG/DL (ref 70–110)
HCT VFR BLD AUTO: 28.9 % (ref 37–48.5)
HGB BLD-MCNC: 8.6 G/DL (ref 12–16)
HYPOCHROMIA BLD QL SMEAR: ABNORMAL
IMM GRANULOCYTES # BLD AUTO: 0.04 K/UL (ref 0–0.04)
IMM GRANULOCYTES NFR BLD AUTO: 0.9 % (ref 0–0.5)
LYMPHOCYTES # BLD AUTO: 0.7 K/UL (ref 1–4.8)
LYMPHOCYTES NFR BLD: 15.4 % (ref 18–48)
MAGNESIUM SERPL-MCNC: 2 MG/DL (ref 1.6–2.6)
MCH RBC QN AUTO: 17.5 PG (ref 27–31)
MCHC RBC AUTO-ENTMCNC: 29.8 G/DL (ref 32–36)
MCV RBC AUTO: 59 FL (ref 82–98)
MONOCYTES # BLD AUTO: 0.5 K/UL (ref 0.3–1)
MONOCYTES NFR BLD: 10 % (ref 4–15)
NEUTROPHILS # BLD AUTO: 3.1 K/UL (ref 1.8–7.7)
NEUTROPHILS NFR BLD: 66.9 % (ref 38–73)
NRBC BLD-RTO: 1 /100 WBC
OVALOCYTES BLD QL SMEAR: ABNORMAL
PHOSPHATE SERPL-MCNC: 3.3 MG/DL (ref 2.7–4.5)
PLATELET # BLD AUTO: 146 K/UL (ref 150–450)
PLATELET BLD QL SMEAR: ABNORMAL
PMV BLD AUTO: ABNORMAL FL (ref 9.2–12.9)
POCT GLUCOSE: 107 MG/DL (ref 70–110)
POCT GLUCOSE: 117 MG/DL (ref 70–110)
POIKILOCYTOSIS BLD QL SMEAR: ABNORMAL
POTASSIUM SERPL-SCNC: 3.8 MMOL/L (ref 3.5–5.1)
POTASSIUM SERPL-SCNC: 4.2 MMOL/L (ref 3.5–5.1)
RBC # BLD AUTO: 4.91 M/UL (ref 4–5.4)
SCHISTOCYTES BLD QL SMEAR: ABNORMAL
SCHISTOCYTES BLD QL SMEAR: PRESENT
SODIUM SERPL-SCNC: 136 MMOL/L (ref 136–145)
SODIUM SERPL-SCNC: 138 MMOL/L (ref 136–145)
SPHEROCYTES BLD QL SMEAR: ABNORMAL
WBC # BLD AUTO: 4.68 K/UL (ref 3.9–12.7)

## 2023-06-30 PROCEDURE — 85025 COMPLETE CBC W/AUTO DIFF WBC: CPT

## 2023-06-30 PROCEDURE — 25000003 PHARM REV CODE 250: Performed by: STUDENT IN AN ORGANIZED HEALTH CARE EDUCATION/TRAINING PROGRAM

## 2023-06-30 PROCEDURE — 80048 BASIC METABOLIC PNL TOTAL CA: CPT | Mod: 91

## 2023-06-30 PROCEDURE — 99238 HOSP IP/OBS DSCHRG MGMT 30/<: CPT | Mod: ,,, | Performed by: INTERNAL MEDICINE

## 2023-06-30 PROCEDURE — 25000003 PHARM REV CODE 250: Performed by: HOSPITALIST

## 2023-06-30 PROCEDURE — 25000003 PHARM REV CODE 250: Performed by: INTERNAL MEDICINE

## 2023-06-30 PROCEDURE — 99238 PR HOSPITAL DISCHARGE DAY,<30 MIN: ICD-10-PCS | Mod: ,,, | Performed by: INTERNAL MEDICINE

## 2023-06-30 PROCEDURE — 36415 COLL VENOUS BLD VENIPUNCTURE: CPT

## 2023-06-30 PROCEDURE — 84100 ASSAY OF PHOSPHORUS: CPT

## 2023-06-30 PROCEDURE — 83735 ASSAY OF MAGNESIUM: CPT

## 2023-06-30 PROCEDURE — 25000003 PHARM REV CODE 250

## 2023-06-30 RX ORDER — CETIRIZINE HYDROCHLORIDE 5 MG/1
5 TABLET ORAL DAILY PRN
Start: 2023-06-30 | End: 2024-03-26

## 2023-06-30 RX ORDER — DOBUTAMINE HYDROCHLORIDE 400 MG/100ML
5 INJECTION INTRAVENOUS CONTINUOUS
Start: 2023-06-30

## 2023-06-30 RX ORDER — FERROUS SULFATE 325(65) MG
325 TABLET ORAL DAILY
Refills: 0
Start: 2023-06-30 | End: 2023-11-29 | Stop reason: SDUPTHER

## 2023-06-30 RX ORDER — BUMETANIDE 2 MG/1
TABLET ORAL
Qty: 90 TABLET | Refills: 5 | Status: SHIPPED | OUTPATIENT
Start: 2023-06-30

## 2023-06-30 RX ORDER — VALSARTAN 40 MG/1
40 TABLET ORAL DAILY
Qty: 90 TABLET | Refills: 3 | Status: SHIPPED | OUTPATIENT
Start: 2023-06-30 | End: 2023-06-30 | Stop reason: HOSPADM

## 2023-06-30 RX ORDER — PREGABALIN 50 MG/1
50 CAPSULE ORAL NIGHTLY
Qty: 60 CAPSULE | Refills: 5
Start: 2023-06-30 | End: 2023-10-27 | Stop reason: SDUPTHER

## 2023-06-30 RX ORDER — IPRATROPIUM BROMIDE AND ALBUTEROL SULFATE 2.5; .5 MG/3ML; MG/3ML
3 SOLUTION RESPIRATORY (INHALATION) EVERY 6 HOURS PRN
Qty: 90 ML | Refills: 3
Start: 2023-06-30

## 2023-06-30 RX ORDER — SERTRALINE HYDROCHLORIDE 50 MG/1
50 TABLET, FILM COATED ORAL DAILY
Qty: 30 TABLET | Refills: 3 | Status: SHIPPED | OUTPATIENT
Start: 2023-06-30 | End: 2023-10-26

## 2023-06-30 RX ADMIN — AMIODARONE HYDROCHLORIDE 200 MG: 200 TABLET ORAL at 09:06

## 2023-06-30 RX ADMIN — ISOSORBIDE DINITRATE: 20 TABLET ORAL at 09:06

## 2023-06-30 RX ADMIN — MUPIROCIN: 20 OINTMENT TOPICAL at 09:06

## 2023-06-30 RX ADMIN — APIXABAN 5 MG: 5 TABLET, FILM COATED ORAL at 09:06

## 2023-06-30 RX ADMIN — MECLIZINE HYDROCHLORIDE 25 MG: 12.5 TABLET ORAL at 12:06

## 2023-06-30 RX ADMIN — BUMETANIDE 4 MG: 1 TABLET ORAL at 09:06

## 2023-06-30 RX ADMIN — PANTOPRAZOLE SODIUM 40 MG: 40 TABLET, DELAYED RELEASE ORAL at 09:06

## 2023-06-30 RX ADMIN — PREGABALIN 50 MG: 50 CAPSULE ORAL at 09:06

## 2023-06-30 RX ADMIN — FERROUS SULFATE TAB 325 MG (65 MG ELEMENTAL FE) 1 EACH: 325 (65 FE) TAB at 09:06

## 2023-06-30 NOTE — NURSING
Patient is ready for discharge. Patient stable alert and oriented. Telemetry and IVs removed. No complaints of pain. Discussed discharge plan. Pt going home with home Dobutamine delivered and set up by infusion company. Reviewed medications and side effects, appointments, and answered questions with patient. All other medications picked up by patient/family at [facility]. Patient wheeled down via wheelchair by family members. All belongings sent with patient.

## 2023-06-30 NOTE — DISCHARGE SUMMARY
[facility] - Cardiology Stepdown  Cardiology  Discharge Summary      Patient Name: Hafsa Hawley  MRN: [id number]  Admission Date: 6/27/2023  Hospital Length of Stay: 3 days  Discharge Date and Time:  06/30/2023 12:59 PM  Attending Physician: Althea Escalante MD    Discharging Provider: Barak Burnett DO  Primary Care Physician: Bertha Lorenzo MD    HPI:   Ms. Hafsa Hawley is a 57 year old woman with combined systolic/diastolic HF(LVEF 15%) s/p ICD on palliative dobutamine, PAF on eliquis, CKD4, DM2, HTN, HLD, Gilbert's syndrome, who presented as a transfer from [facility] for decompensated heart failure and [place] on CKD.     Of note she was recently admitted to [facility] on the hospital medicine service for [place], and at the time her Bumex was decreased from BID to daily. She was evaluated by Advanced HF team and deemed not a candidate for advanced options due to progressive renal dysfunction. She was discharged home on palliative dobutmine as well as adjusted GDMT (entresto, jardiance and Bidil). Her renal function at the time stabilized and on DC her Cr was 2.4.     She presented this time to [facility] with generalized fatigue and epigastric pain. She has had near daily vomiting. Patient fell at home as well as she was too weak. Has left sided chest pain, shoulder pain, back pain and hip pain. She has chronic LAGUERRE, orthopnea. Routine labs checked earlier in the day and given worsening Cr 2.7 was told to present to the ED by Dr Alisa Camilo. At OSH her Cr on repeat was 2.9. At [facility] Nephrology services were not available thus patient was transferred to [facility] for further workup.       * No surgery found *     Indwelling Lines/Drains at time of discharge:  Lines/Drains/Airways     Peripherally Inserted Central Catheter Line  Duration           PICC Double Lumen 05/12/23 1534 right brachial 48 days                Hospital Course:  While admitted patient CR improved with diuresis and DBM infusions. Psychiatry and palliative  consulted for coping with her poor prognosis after patient became tearful and withdrawn. Patient was transitioned back to home Bumex. Renal function improved to near baseline. Entresto held with plan to transition to Valsartan outpatient in setting of worsening renal function. Referral to psych placed for outpatient. Resources for other centers with advanced HF services at patient request. Patient medically ready for discharge on 6/30 with increased home DBM infusion rate of 5mcg      Goals of Care Treatment Preferences:  Code Status: Full Code    Health care agent: Erika Estrada  [facility] care agent number: No value filed.          What is most important right now is to focus on curative/life-prolongation (regardless of treatment burdens), remaining as independent as possible, symptom/pain control.  Accordingly, we have decided that the best plan to meet the patient's goals includes continuing with treatment.      Consults:   Consults (From admission, onward)        Status Ordering Provider     Inpatient consult to Palliative Care  Once        Provider:  (Not yet assigned)    Completed MARYJO CURTIS     Inpatient consult to Psychiatry  Once        Provider:  (Not yet assigned)    Completed MARYJO CURTIS          Significant Diagnostic Studies: N/A    Pending Diagnostic Studies:     Procedure Component Value Units Date/Time    Basic metabolic panel [[id number]] Collected: 06/29/23 0736    Order Status: Sent Lab Status: In process Updated: 06/29/23 0737    Specimen: Blood           Final Active Diagnoses:    Diagnosis Date Noted POA    PRINCIPAL PROBLEM:  ACC/AHA stage D systolic heart failure [I50.20] 05/15/2023 Yes    Pain [R52] 06/29/2023 Unknown    Paroxysmal A-fib [I48.0] 06/28/2023 Unknown    Chest wall tenderness [R07.89] 06/28/2023 Unknown    Palliative care encounter [Z51.5] 05/15/2023 Not Applicable    NSVT (nonsustained ventricular tachycardia) [I47.29] 11/02/2022 Yes    CKD (chronic  kidney disease), stage IV [N18.4] 08/23/2022 Yes     Chronic    Restrictive lung disease [J98.4] 04/26/2022 Yes    Acute kidney injury superimposed on chronic kidney disease [N17.9, N18.9] 10/28/2021 Yes    NSTEMI (non-ST elevated myocardial infarction) [I21.4] 09/03/2020 Unknown    Type 2 diabetes mellitus with stage 4 chronic kidney disease, with long-term current use of insulin [E11.22, N18.4, Z79.4] 08/13/2020 Not Applicable     Chronic    elevated bilirubin d/t Gilbert's syndrome [E80.4]  Yes    Microcytic anemia [D50.9] 07/20/2016 Yes     Chronic      Problems Resolved During this Admission:     No new Assessment & Plan notes have been filed under this hospital service since the last note was generated.  Service: Cardiology      Discharged Condition: stable    Disposition: Home or Self Care    Follow Up:    Patient Instructions:      BASIC METABOLIC PANEL   Standing Status: Future Standing Exp. Date: 08/28/24     Ambulatory referral/consult to Psychiatry   Standing Status: Future   Referral Priority: Routine Referral Type: Psychiatric   Referral Reason: Specialty Services Required   Requested Specialty: Psychiatry   Number of Visits Requested: 1     Medications:  Reconciled Home Medications:      Medication List      START taking these medications    DOBUTamine 1,000 mg/250 mL (4,000 mcg/mL) infusion  Commonly known as: DOBUTREX  Inject 409 mcg/min into the vein continuous.     sertraline 50 MG tablet  Commonly known as: ZOLOFT  Take 1 tablet (50 mg total) by mouth once daily.        CHANGE how you take these medications    albuterol-ipratropium 2.5 mg-0.5 mg/3 mL nebulizer solution  Commonly known as: DUO-NEB  Take 3 mLs by nebulization every 6 (six) hours as needed for Wheezing or Shortness of Breath.  What changed:   · when to take this  · reasons to take this     bumetanide 2 MG tablet  Commonly known as: BUMEX  Take 2 tablets (4mg total) by mouth in morning and take 1 tablet (2mg total) by mouth in  the evening (no later then 5pm).  What changed:   · how much to take  · how to take this  · when to take this  · additional instructions  · Another medication with the same name was removed. Continue taking this medication, and follow the directions you see here.     cetirizine 5 MG tablet  Commonly known as: ZYRTEC  Take 1 tablet (5 mg total) by mouth daily as needed for Allergies.  What changed:   · medication strength  · how much to take     ferrous sulfate 325 mg (65 mg iron) Tab tablet  Commonly known as: FEOSOL  Take 1 tablet (325 mg total) by mouth once daily.  What changed: See the new instructions.     pregabalin 50 MG capsule  Commonly known as: LYRICA  Take 1 capsule (50 mg total) by mouth every evening.  What changed: when to take this        CONTINUE taking these medications    amiodarone 200 MG Tab  Commonly known as: PACERONE  Take 200 mg by mouth.     atorvastatin 40 MG tablet  Commonly known as: LIPITOR  Take 1 tablet (40 mg total) by mouth every evening.     b complex vitamins tablet  Take 1 tablet by mouth once daily.     blood sugar diagnostic Strp  Commonly known as: ACCU-CHEK GUIDE TEST STRIPS  1 strip by Other route 3 (three) times daily.     DEXCOM G7 SENSOR Evon  Generic drug: blood-glucose sensor  1 Device by Misc.(Non-Drug; Combo Route) route every 10 days.     diclofenac sodium 1 % Gel  Commonly known as: VOLTAREN  APPLY 2 GRAMS TOPICALLY 3 (THREE) TIMES DAILY AS NEEDED (PAIN).     DULERA 200-5 mcg/actuation inhaler  Generic drug: mometasone-formoterol  Inhale 2 puffs into the lungs 2 (two) times daily. Controller     ELIQUIS 5 mg Tab  Generic drug: apixaban  TAKE 1 TABLET (5 MG TOTAL) BY MOUTH 2 (TWO) TIMES DAILY.     ergocalciferol 50,000 unit Cap  Commonly known as: ERGOCALCIFEROL  Take 1 capsule (50,000 Units total) by mouth every Saturday.     fluticasone propionate 50 mcg/actuation nasal spray  Commonly known as: FLONASE  2 sprays by Each Nostril route daily as needed for  Rhinitis.     isosorbide-hydrALAZINE 20-37.5 mg 20-37.5 mg Tab  Commonly known as: BIDIL  Take 1 tablet by mouth 3 (three) times daily.     lancets Misc  Commonly known as: ACCU-CHEK SOFTCLIX LANCETS  1 Device by Misc.(Non-Drug; Combo Route) route 3 (three) times daily.     pantoprazole 40 MG tablet  Commonly known as: PROTONIX  TAKE 1 TABLET BY MOUTH DAILY IN THE MORNING     rOPINIRole 4 MG tablet  Commonly known as: REQUIP  TAKE 1 TABLET (4 MG TOTAL) BY MOUTH ONCE DAILY. PT TAKING 4MG DAILY     semaglutide 0.25 mg or 0.5 mg (2 mg/3 mL) pen injector  Commonly known as: OZEMPIC  Inject 0.5 mg into the skin every 7 days.     SPIRIVA WITH HANDIHALER 18 mcg inhalation capsule  Generic drug: tiotropium  Inhale 1 capsule (18 mcg total) into the lungs once daily.     VENTOLIN HFA 90 mcg/actuation inhaler  Generic drug: albuterol  Inhale 2 puffs into the lungs every 6 (six) hours as needed for Shortness of Breath or Wheezing.        STOP taking these medications    allopurinoL 100 MG tablet  Commonly known as: ZYLOPRIM     ALPRAZolam 2 MG Tab  Commonly known as: XANAX     DOBUTAMINE IV     ENTRESTO 24-26 mg per tablet  Generic drug: sacubitriL-valsartan     EScitalopram oxalate 10 MG tablet  Commonly known as: LEXAPRO     HYDROcodone-acetaminophen 7.5-325 mg per tablet  Commonly known as: NORCO     JARDIANCE 10 mg tablet  Generic drug: empagliflozin     nitroGLYCERIN 0.4 MG SL tablet  Commonly known as: NITROSTAT     ondansetron 4 MG tablet  Commonly known as: ZOFRAN     ondansetron 8 MG Tbdl  Commonly known as: ZOFRAN-ODT     traZODone 50 MG tablet  Commonly known as: DESYREL            Time spent on the discharge of patient: 36 minutes    Barak Burnett DO  Cardiology  [facility] - Cardiology Stepdown

## 2023-06-30 NOTE — PLAN OF CARE
06/30/23 1118   Post-Acute Status   Post-Acute Authorization IV Infusion   IV Infusion Status Awaiting delivery     TONYA confirmed with Sandra with [facility] ([phone]) that pt was a cuurent  pt of their's but discussed that since pt's dosage has increased she will need new bags delivered prior to discharge.  Therefore, pt should not discharge until bag has been delivered to bedside.  Per Sandra once delivered pt will be able to hook herself up.  TONYA relayed this to pt at bedside and also to LAURIE Obrien.      Lourdes Elder, YAMILETH  [facility]  [id number]

## 2023-06-30 NOTE — PLAN OF CARE
[facility] - Cardiology Stepdown  Discharge Final Note    Primary Care Provider: Bertha Lorenzo MD    Expected Discharge Date: 6/30/2023    Final Discharge Note (most recent)       Final Note - 06/30/23 1327          Final Note    Assessment Type Final Discharge Note     Anticipated Discharge Disposition IV Therapy Provider        Post-Acute Status    Post-Acute Authorization IV Infusion     IV Infusion Status Set-up Complete/Auth obtained                 Per Sandra with [facility] pt has been hooked up to her new  dosage and is good to discharge.   relayed this to LAURIE Obrien.      Lourdes Elder LMSW [facility]  [id number]      Future Appointments   Date Time Provider Department [place]   7/3/2023 12:30 PM CHAIR 01 STAH STAH CHEMO [facility]   7/10/2023 10:30 AM CHAIR 02 STAH STAH CHEMO [facility]   7/11/2023 11:00 AM Sandra Hernandes MD [facility] PAL MED [facility]   7/17/2023 12:30 PM CHAIR 03 STAH STAH CHEMO [facility]   7/19/2023  9:40 AM COORDINATED DEVICE CHECK [facility] MICHELETSTEVO [facility]   7/19/2023 10:15 AM EKG, APPT [facility] EKG [facility]   7/19/2023 11:00 AM Bob Pretty MD [facility] ARRHYTH [facility]   7/25/2023 10:00 AM HOME MONITOR DEVICE CHECK, [facility] ARRJOSE MANUEL [facility]   8/7/2023 10:00 AM Uziel Herman MD [facility] ENDOCR [place]   8/9/2023  3:30 PM Cristobal Ann MD [facility] MED [place]   10/10/2023  1:30 PM Cyndee Mattson MD [facility] PULM [place] ACT

## 2023-07-03 ENCOUNTER — INFUSION (OUTPATIENT)
Dept: INFUSION THERAPY | Facility: HOSPITAL | Age: 58
End: 2023-07-03
Attending: INTERNAL MEDICINE
Payer: MEDICAID

## 2023-07-03 ENCOUNTER — PATIENT OUTREACH (OUTPATIENT)
Dept: ADMINISTRATIVE | Facility: CLINIC | Age: 58
End: 2023-07-03
Payer: MEDICAID

## 2023-07-03 ENCOUNTER — DOCUMENTATION ONLY (OUTPATIENT)
Dept: TRANSPLANT | Facility: CLINIC | Age: 58
End: 2023-07-03
Payer: MEDICAID

## 2023-07-03 VITALS
RESPIRATION RATE: 18 BRPM | TEMPERATURE: 98 F | HEART RATE: 93 BPM | SYSTOLIC BLOOD PRESSURE: 111 MMHG | DIASTOLIC BLOOD PRESSURE: 58 MMHG

## 2023-07-03 DIAGNOSIS — I42.9 CARDIOMYOPATHY, UNSPECIFIED TYPE: Primary | ICD-10-CM

## 2023-07-03 PROCEDURE — 96523 IRRIG DRUG DELIVERY DEVICE: CPT

## 2023-07-03 PROCEDURE — 63600175 PHARM REV CODE 636 W HCPCS: Performed by: INTERNAL MEDICINE

## 2023-07-03 RX ORDER — HEPARIN 100 UNIT/ML
5 SYRINGE INTRAVENOUS ONCE
Status: COMPLETED | OUTPATIENT
Start: 2023-07-03 | End: 2023-07-03

## 2023-07-03 RX ADMIN — HEPARIN 500 UNITS: 100 SYRINGE at 01:07

## 2023-07-03 NOTE — PROGRESS NOTES
Attempted blood draw from PICC  unable to get blood   flushed with saline and heparin  Lisset puncture to left ac done for blood work

## 2023-07-03 NOTE — PROGRESS NOTES
4:00 pm:  F/U on todays weekly inotrope labs  Results in epic and stable for pts norms  Cr improved to 2.5  from 2.7 and 3.1    CBC  : H&H runs low

## 2023-07-03 NOTE — PROGRESS NOTES
1300  Right upper arm PICC line dressing change done using sterile technique  No signs of infection noted.  Stat lock device, bio patch, and tegaderm dressing applied.  Tolerated well

## 2023-07-06 ENCOUNTER — PATIENT MESSAGE (OUTPATIENT)
Dept: PALLIATIVE MEDICINE | Facility: CLINIC | Age: 58
End: 2023-07-06
Payer: MEDICAID

## 2023-07-06 DIAGNOSIS — R52 PAIN: ICD-10-CM

## 2023-07-06 DIAGNOSIS — R06.00 DYSPNEA, UNSPECIFIED TYPE: ICD-10-CM

## 2023-07-06 RX ORDER — HYDROCODONE BITARTRATE AND ACETAMINOPHEN 7.5; 325 MG/1; MG/1
1 TABLET ORAL EVERY 8 HOURS PRN
Qty: 90 TABLET | Refills: 0 | Status: SHIPPED | OUTPATIENT
Start: 2023-07-06 | End: 2023-09-12 | Stop reason: SDUPTHER

## 2023-07-07 ENCOUNTER — TELEPHONE (OUTPATIENT)
Dept: PALLIATIVE MEDICINE | Facility: CLINIC | Age: 58
End: 2023-07-07
Payer: MEDICAID

## 2023-07-07 NOTE — TELEPHONE ENCOUNTER
PA approved for hydrocodone. Called [facility] to inform. Called pt and left VM informing her that she can pick it up at [facility].

## 2023-07-10 ENCOUNTER — TELEPHONE (OUTPATIENT)
Dept: PALLIATIVE MEDICINE | Facility: CLINIC | Age: 58
End: 2023-07-10
Payer: MEDICAID

## 2023-07-10 ENCOUNTER — INFUSION (OUTPATIENT)
Dept: INFUSION THERAPY | Facility: HOSPITAL | Age: 58
End: 2023-07-10
Attending: INTERNAL MEDICINE
Payer: MEDICAID

## 2023-07-10 VITALS
RESPIRATION RATE: 18 BRPM | HEART RATE: 80 BPM | SYSTOLIC BLOOD PRESSURE: 105 MMHG | DIASTOLIC BLOOD PRESSURE: 64 MMHG | TEMPERATURE: 98 F

## 2023-07-10 DIAGNOSIS — I42.8 NICM (NONISCHEMIC CARDIOMYOPATHY): ICD-10-CM

## 2023-07-10 DIAGNOSIS — I50.42 CHRONIC COMBINED SYSTOLIC AND DIASTOLIC HEART FAILURE: ICD-10-CM

## 2023-07-10 DIAGNOSIS — Z76.82 ORGAN TRANSPLANT CANDIDATE: ICD-10-CM

## 2023-07-10 LAB
ALBUMIN SERPL BCP-MCNC: 3.8 G/DL (ref 3.5–5.2)
ALP SERPL-CCNC: 60 U/L (ref 55–135)
ALT SERPL W/O P-5'-P-CCNC: 17 U/L (ref 10–44)
ANION GAP SERPL CALC-SCNC: 12 MMOL/L (ref 8–16)
ANISOCYTOSIS BLD QL SMEAR: ABNORMAL
AST SERPL-CCNC: 22 U/L (ref 10–40)
BASOPHILS # BLD AUTO: 0.02 K/UL (ref 0–0.2)
BASOPHILS NFR BLD: 0.5 % (ref 0–1.9)
BILIRUB SERPL-MCNC: 1.5 MG/DL (ref 0.1–1)
BNP SERPL-MCNC: 336 PG/ML (ref 0–99)
BUN SERPL-MCNC: 52 MG/DL (ref 6–20)
CALCIUM SERPL-MCNC: 9.6 MG/DL (ref 8.7–10.5)
CHLORIDE SERPL-SCNC: 106 MMOL/L (ref 95–110)
CO2 SERPL-SCNC: 25 MMOL/L (ref 23–29)
CREAT SERPL-MCNC: 2.7 MG/DL (ref 0.5–1.4)
DACRYOCYTES BLD QL SMEAR: ABNORMAL
DIFFERENTIAL METHOD: ABNORMAL
EOSINOPHIL # BLD AUTO: 0.1 K/UL (ref 0–0.5)
EOSINOPHIL NFR BLD: 2.3 % (ref 0–8)
ERYTHROCYTE [DISTWIDTH] IN BLOOD BY AUTOMATED COUNT: 28.1 % (ref 11.5–14.5)
EST. GFR  (NO RACE VARIABLE): 20 ML/MIN/1.73 M^2
GLUCOSE SERPL-MCNC: 162 MG/DL (ref 70–110)
HCT VFR BLD AUTO: 31.3 % (ref 37–48.5)
HGB BLD-MCNC: 9.4 G/DL (ref 12–16)
HYPOCHROMIA BLD QL SMEAR: ABNORMAL
IMM GRANULOCYTES # BLD AUTO: 0.02 K/UL (ref 0–0.04)
IMM GRANULOCYTES NFR BLD AUTO: 0.5 % (ref 0–0.5)
LYMPHOCYTES # BLD AUTO: 0.6 K/UL (ref 1–4.8)
LYMPHOCYTES NFR BLD: 15.3 % (ref 18–48)
MCH RBC QN AUTO: 17.8 PG (ref 27–31)
MCHC RBC AUTO-ENTMCNC: 30 G/DL (ref 32–36)
MCV RBC AUTO: 59 FL (ref 82–98)
MONOCYTES # BLD AUTO: 0.3 K/UL (ref 0.3–1)
MONOCYTES NFR BLD: 7.9 % (ref 4–15)
NEUTROPHILS # BLD AUTO: 2.9 K/UL (ref 1.8–7.7)
NEUTROPHILS NFR BLD: 73.5 % (ref 38–73)
NRBC BLD-RTO: 0 /100 WBC
OVALOCYTES BLD QL SMEAR: ABNORMAL
PLATELET # BLD AUTO: 180 K/UL (ref 150–450)
PLATELET BLD QL SMEAR: ABNORMAL
PMV BLD AUTO: ABNORMAL FL (ref 9.2–12.9)
POIKILOCYTOSIS BLD QL SMEAR: ABNORMAL
POTASSIUM SERPL-SCNC: 3.8 MMOL/L (ref 3.5–5.1)
PROT SERPL-MCNC: 7.1 G/DL (ref 6–8.4)
RBC # BLD AUTO: 5.27 M/UL (ref 4–5.4)
SCHISTOCYTES BLD QL SMEAR: PRESENT
SODIUM SERPL-SCNC: 143 MMOL/L (ref 136–145)
TARGETS BLD QL SMEAR: ABNORMAL
WBC # BLD AUTO: 3.92 K/UL (ref 3.9–12.7)

## 2023-07-10 PROCEDURE — 85025 COMPLETE CBC W/AUTO DIFF WBC: CPT | Performed by: INTERNAL MEDICINE

## 2023-07-10 PROCEDURE — 80053 COMPREHEN METABOLIC PANEL: CPT | Performed by: INTERNAL MEDICINE

## 2023-07-10 PROCEDURE — 83880 ASSAY OF NATRIURETIC PEPTIDE: CPT | Performed by: INTERNAL MEDICINE

## 2023-07-10 PROCEDURE — 36415 COLL VENOUS BLD VENIPUNCTURE: CPT

## 2023-07-10 NOTE — PROGRESS NOTES
1330  Right upper arm PICC line done using sterile technique  No signs of infection noted.   Stat lock device, bio patch and tegaderm dressing reapplied.  Tolerated well

## 2023-07-11 ENCOUNTER — TELEPHONE (OUTPATIENT)
Dept: TRANSPLANT | Facility: CLINIC | Age: 58
End: 2023-07-11
Payer: MEDICAID

## 2023-07-11 ENCOUNTER — HOSPITAL ENCOUNTER (EMERGENCY)
Facility: HOSPITAL | Age: 58
Discharge: HOME | End: 2023-07-11
Attending: STUDENT IN AN ORGANIZED HEALTH CARE EDUCATION/TRAINING PROGRAM
Payer: MEDICAID

## 2023-07-11 ENCOUNTER — OFFICE VISIT (OUTPATIENT)
Dept: PALLIATIVE MEDICINE | Facility: CLINIC | Age: 58
End: 2023-07-11
Payer: MEDICAID

## 2023-07-11 VITALS
BODY MASS INDEX: 29.85 KG/M2 | TEMPERATURE: 98 F | RESPIRATION RATE: 18 BRPM | HEART RATE: 91 BPM | OXYGEN SATURATION: 95 % | WEIGHT: 184.94 LBS | DIASTOLIC BLOOD PRESSURE: 74 MMHG | SYSTOLIC BLOOD PRESSURE: 115 MMHG

## 2023-07-11 DIAGNOSIS — R52 PAIN: ICD-10-CM

## 2023-07-11 DIAGNOSIS — K59.00 CONSTIPATION, UNSPECIFIED CONSTIPATION TYPE: Primary | ICD-10-CM

## 2023-07-11 DIAGNOSIS — Z78.9 PROBLEM WITH VASCULAR ACCESS: Primary | ICD-10-CM

## 2023-07-11 DIAGNOSIS — R06.00 DYSPNEA, UNSPECIFIED TYPE: ICD-10-CM

## 2023-07-11 PROCEDURE — 3044F HG A1C LEVEL LT 7.0%: CPT | Mod: CPTII,95,, | Performed by: STUDENT IN AN ORGANIZED HEALTH CARE EDUCATION/TRAINING PROGRAM

## 2023-07-11 PROCEDURE — 99215 PR OFFICE/OUTPT VISIT, EST, LEVL V, 40-54 MIN: ICD-10-PCS | Mod: 95,,, | Performed by: STUDENT IN AN ORGANIZED HEALTH CARE EDUCATION/TRAINING PROGRAM

## 2023-07-11 PROCEDURE — 99497 PR ADVNCD CARE PLAN 30 MIN: ICD-10-PCS | Mod: 95,,, | Performed by: STUDENT IN AN ORGANIZED HEALTH CARE EDUCATION/TRAINING PROGRAM

## 2023-07-11 PROCEDURE — 1111F PR DISCHARGE MEDS RECONCILED W/ CURRENT OUTPATIENT MED LIST: ICD-10-PCS | Mod: CPTII,95,, | Performed by: STUDENT IN AN ORGANIZED HEALTH CARE EDUCATION/TRAINING PROGRAM

## 2023-07-11 PROCEDURE — 99497 ADVNCD CARE PLAN 30 MIN: CPT | Mod: 95,,, | Performed by: STUDENT IN AN ORGANIZED HEALTH CARE EDUCATION/TRAINING PROGRAM

## 2023-07-11 PROCEDURE — 4010F ACE/ARB THERAPY RXD/TAKEN: CPT | Mod: CPTII,95,, | Performed by: STUDENT IN AN ORGANIZED HEALTH CARE EDUCATION/TRAINING PROGRAM

## 2023-07-11 PROCEDURE — 99215 OFFICE O/P EST HI 40 MIN: CPT | Mod: 95,,, | Performed by: STUDENT IN AN ORGANIZED HEALTH CARE EDUCATION/TRAINING PROGRAM

## 2023-07-11 PROCEDURE — 3044F PR MOST RECENT HEMOGLOBIN A1C LEVEL <7.0%: ICD-10-PCS | Mod: CPTII,95,, | Performed by: STUDENT IN AN ORGANIZED HEALTH CARE EDUCATION/TRAINING PROGRAM

## 2023-07-11 PROCEDURE — 4010F PR ACE/ARB THEARPY RXD/TAKEN: ICD-10-PCS | Mod: CPTII,95,, | Performed by: STUDENT IN AN ORGANIZED HEALTH CARE EDUCATION/TRAINING PROGRAM

## 2023-07-11 PROCEDURE — 1111F DSCHRG MED/CURRENT MED MERGE: CPT | Mod: CPTII,95,, | Performed by: STUDENT IN AN ORGANIZED HEALTH CARE EDUCATION/TRAINING PROGRAM

## 2023-07-11 PROCEDURE — 99281 EMR DPT VST MAYX REQ PHY/QHP: CPT

## 2023-07-11 RX ORDER — EMPAGLIFLOZIN 25 MG/1
25 TABLET, FILM COATED ORAL
COMMUNITY
Start: 2023-06-12 | End: 2023-10-05 | Stop reason: ALTCHOICE

## 2023-07-11 RX ORDER — METOPROLOL SUCCINATE 50 MG/1
TABLET, EXTENDED RELEASE ORAL
Status: ON HOLD | COMMUNITY
End: 2023-12-15 | Stop reason: HOSPADM

## 2023-07-11 RX ORDER — TRAZODONE HYDROCHLORIDE 50 MG/1
TABLET ORAL
COMMUNITY
Start: 2023-06-12 | End: 2023-10-26

## 2023-07-11 RX ORDER — ALLOPURINOL 100 MG/1
TABLET ORAL
COMMUNITY
End: 2023-10-05 | Stop reason: ALTCHOICE

## 2023-07-11 RX ORDER — SACUBITRIL AND VALSARTAN 49; 51 MG/1; MG/1
TABLET, FILM COATED ORAL
COMMUNITY
Start: 2023-05-16 | End: 2023-11-29 | Stop reason: SDUPTHER

## 2023-07-11 RX ORDER — BUDESONIDE AND FORMOTEROL FUMARATE DIHYDRATE 80; 4.5 UG/1; UG/1
AEROSOL RESPIRATORY (INHALATION)
COMMUNITY
End: 2023-10-10

## 2023-07-11 RX ORDER — ALPRAZOLAM 2 MG/1
2 TABLET ORAL 2 TIMES DAILY
COMMUNITY
Start: 2023-07-07 | End: 2023-11-29 | Stop reason: SDUPTHER

## 2023-07-11 NOTE — ED PROVIDER NOTES
"Encounter Date: 7/11/2023       History     Chief Complaint   Patient presents with    Vascular Access Problem     Patient to ER states her PICC line extension set is leaking       Chief complaint PICC line problem   [age] year female with a history of CHF anemia arthritis AFib CKD depression GERD hypertension diabetes sleep apnea right kidney mass DVTs and on home Dobutrex drip presents to be evaluated for leaking at her PICC line.  Patient states that she recently was in the hospital where they added and extension light her PICC and has been having leaking of small drips for the last 24 hours.  She reports she is had the PICC for 2 months with no incident prior to having extension set placed.  She reports no pain or redness to the site    Review of patient's allergies indicates:   Allergen Reactions    Penicillins Hives and Other (See Comments)    Iodinated contrast media Nausea And Vomiting    Oxycodone-acetaminophen Other (See Comments)     Nausea, Dizziness, Anxiety.  "I don't like how it makes me feel."   Given Hydromorphone 0.5mg IVP  Without problems.  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 Metabolism of other drugs not listed here may also be impacted by UGT1A1 enzyme activity.       Tramadol Nausea And Vomiting and Other (See Comments)     Other reaction(s): Other (See Comments)    Valsartan Other (See Comments)     Past Medical History:   Diagnosis " Date    Anemia     Arthritis     Atrial fibrillation     OAC    Chronic respiratory failure with hypoxia, on home oxygen therapy     2L with activity, off at rest.  Per Pulm  no overt evidence of ILD or COPD on PFTs and CT to explain O2 needs.    CKD (chronic kidney disease), stage IV 05/08/2018    Congestive heart failure     s/p AICD placement,    Deep vein thrombosis     Depression     elevated bilirubin d/t Gilbert's syndrome     confirmed by [facility] genetic testing, evaluated by hepatology    Encounter for blood transfusion     GERD (gastroesophageal reflux disease)     Hypertension     Pheochromocytoma, malignant     Right kidney mass     Sleep apnea     Thalassemia trait, alpha     Thyroid disease     Type 2 diabetes mellitus with hyperglycemia, without long-term current use of insulin 08/13/2020     Past Surgical History:   Procedure Laterality Date    APPENDECTOMY      BONE MARROW BIOPSY      CARDIAC DEFIBRILLATOR PLACEMENT Left 12/2016    CARDIAC ELECTROPHYSIOLOGY MAPPING AND ABLATION      CARDIAC ELECTROPHYSIOLOGY MAPPING AND ABLATION      COLONOSCOPY N/A 5/6/2022    Procedure: COLONOSCOPY;  Surgeon: Arely Betancourt MD;  Location: [facility] ([address] [place] [address]);  Service: Endoscopy;  Laterality: N/A;  heart transplant candidate/ EF 25% - 2nd floor/ defib - [facility] - [facility]  Eliquis - per Dr. Cortez with [facility], Pt ok to hold Eliquis x 2 days prior-see media tab-outside correspondence dated 12/30/21  - [facility]  verbal instructions/portal instructions/email instructions - s    EYE SURGERY      due to running tears    FRACTURE SURGERY Left     hand 5th digit    HYSTERECTOMY      KNEE SURGERY Left 2016    hematoma    LIVER BIOPSY  10/24/2018    Minimal steatosis, predominantly macrovesicular, 1%, Minimal nonspecific portal inflammation, no fibrosis. No findings on biopsy to explain elevated bilirubin levels. Could be d/t Gilbert's =?- hemolysis    RIGHT HEART CATHETERIZATION Right 12/07/2021    Procedure: INSERTION,  CATHETER, RIGHT HEART;  Surgeon: Irving Cardenas MD;  Location: [facility] CATH LAB;  Service: Cardiology;  Laterality: Right;    RIGHT HEART CATHETERIZATION Right 2022    Procedure: INSERTION, CATHETER, RIGHT HEART;  Surgeon: Burke Camilo MD;  Location: [facility] CATH LAB;  Service: Cardiology;  Laterality: Right;    RIGHT HEART CATHETERIZATION Right 3/29/2023    Procedure: INSERTION, CATHETER, RIGHT HEART;  Surgeon: Katie Liriano DO;  Location: [facility] CATH LAB;  Service: Cardiology;  Laterality: Right;    TRANSJUGULAR BIOPSY OF LIVER N/A 10/24/2018    Procedure: BIOPSY, LIVER, TRANSJUGULAR APPROACH;  Surgeon: Carmen Diagnostic Provider;  Location: [facility] OR [address] [place] [address];  Service: Radiology;  Laterality: N/A;     Family History   Problem Relation Age of Onset    Cancer Mother         pancreatic CA early 50's    Heart disease Father          MI in late 50's    Hypertension Father     Heart attack Father     Heart disease Sister     Heart disease Brother     Cirrhosis Brother         alcoholic    Heart disease Sister     Heart disease Brother     Hypertension Brother     Diabetes Brother      Social History     Tobacco Use    Smoking status: Never    Smokeless tobacco: Never   Substance Use Topics    Alcohol use: Yes     Comment: up to 1 yr ago drank 2-3 drinks on occasion but sporadic    Drug use: No     Review of Systems   Skin:  Negative for color change and wound.     Physical Exam     Initial Vitals [23 1622]   BP Pulse Resp Temp SpO2   115/74 91 18 98.3 °F (36.8 °C) 95 %      MAP       --         Physical Exam    Nursing note and vitals reviewed.  Constitutional: She appears well-developed and well-nourished.   Musculoskeletal:         General: No tenderness or edema. Normal range of motion.     Skin: Skin is warm and dry. No erythema.   PICC line in place to right forearm   Psychiatric: She has a normal mood and affect. Thought content normal.       ED Course   Procedures  Labs Reviewed - No data to  display       Imaging Results    None          Medications - No data to display  Medical Decision Making:   Differential Diagnosis:   Problem with vascular access device  ED Management:  Patient's PICC line was assessed in the ED today patient had no difficulty flushing erythema or drainage from the PICC line site.    Extension set removed from PICC line tubing.    Patient's Dobutrex infusion was infusing without mechanical complications or dripping  Patient is stable for DC she was instructed to follow with primary care                        Clinical Impression:   Final diagnoses:  [Z78.9] Problem with vascular access (Primary)        ED Disposition Condition    Discharge Stable          ED Prescriptions    None       Follow-up Information    None          Salud Pagan NP  07/11/23 [id number]

## 2023-07-11 NOTE — PROGRESS NOTES
[facility]all[facility] Medicine Clinic Note      Consult Requested By: No ref. provider found    Primary Care Physician:   Bertha Lorenzo MD    Reason for Consult: Advance care planning and symptom management in the setting of heart Failure     The patient location is: [facility]  The chief complaint leading to consultation is: pain    Visit type: audiovisual    Face to Face time with patient: 32min  60 minutes of total time spent on the encounter, which includes face to face time and non-face to face time preparing to see the patient (eg, review of tests), Obtaining and/or reviewing separately obtained history, Documenting clinical information in the electronic or other health record, Independently interpreting results (not separately reported) and communicating results to the patient/family/caregiver, or Care coordination (not separately reported).       Each patient provided with medical services by telemedicine is:  (1) informed of the relationship between the physician and patient and the respective role of any other health care provider with respect to management of the patient; and (2) notified that he or she may decline to receive medical services by telemedicine and may withdraw from such care at any time.          ASSESSMENT/PLAN:     Plan/Recommendations:  Diagnoses and all orders for this visit:      Congestive heart failure, unspecified HF chronicity, unspecified heart failure type /  Acute decompensated heart failure / Pulmonary HTN  -NICM since 2013 HFrEF (EF 10%) s/p AICD placement  -Not a candidate for advance therapies   -recent admission for ADHF,last admission June  -now on palliative dobutamine 5mcg/kg/min  -Taking Bumex BID,   -reports dyspnea, possible fluid overload.  Discussed taking extra Bumex   -discussed her prognosis again discussed that in best case scenario her prognosis is months  -She is hoping to start transplant workup at [facility]      Pain   -reports   -On [place]  7.5-325mg q 8H PRN     Fatigue/dyspnea  - discussed the importance of graded exercise  -continues to walk around the house    Depression/anxiety  -worsening depression  - discussed PM Behavioral therapy and  support   -lexapro to 10mg daily   -discussed avoiding Xanax as much as possible     Nausea  -  ondansetron (ZOFRAN-ODT) 8 MG TbDL; Take 1 tablet (8 mg total) by mouth every 12 (twelve) hours as needed (nausea).      Constipation  Trial of miralax  Linzess 72mctg / day       Palliative care encounter  Medicolegal: Has decision making capacity. Named her daughter Erika Estrada is HCPOA.     Psychosocial:  support system consists of granddaughter, daughter and extended faily     Spiritual: Mandaeism    Understanding of disease and Illness Trajectory: Patient  has  adequate understanding of her illness, they can benefit from continued education on what to expect in the future.      Goals of care:      Advance Care Planning     Date: 07/11/2023    I engaged the patient and   in a voluntary conversation about advance care planning and we specifically addressed what the goals of care would be moving forward, in light of the patient's change in clinical status, specifically worsening heart failure with multiple admissions.  We did specifically address the patient's likely prognosis, which is poor.  I shared that in the best case scenario her prognosis might be month and that given the nature of her heart condition she can die any time. I did explain the role for hospice care at this stage of the patient's illness, including its ability to help the patient live with the best quality of life possible.       We explored the patient's values and preferences for future care.  The patient endorses that what is most important right now is to focus on curative/life-prolongation (regardless of treatment burdens),.  She states that she is doing everything in her power to be evaluated at [facility] because  she is hoping to obtain a heart transplant.  Her  encouraged her to do everything as fast as possible so that she can go to [place] soon.     Accordingly, we have decided that the best plan to meet the patient's goals includes continuing with treatment            ACP Date: 05/24/2023  I engaged the patient in a conversation about advance care planning and we specifically addressed what the goals of care would be moving forward, in light of the patient's change in clinical status, specifically now on palliative dobutamine.  We did specifically address the patient's likely prognosis, which is poor.  We discussed that dobutamine is mainly to help her feel better and will not change her heart condition. We explored the patient's values and preferences for future care.  The patient endorses that what is most important right now is to focus on remaining as independent as possible, symptom/pain control, and curative/life-prolongation (regardless of treatment burdens).  She shared that she is hoping for the better that she is hoping to get better.  She hopes that her heart will get better, she shared that she knows that she has been told in the past that it will not improve.  She shared that she is hoping for a miracle to happen so that her heart improves.  She is also hoping to be able to spend more time with family to be part of the activities to go out and enjoy eating with them.  Accordingly, we have decided that the best plan to meet the patient's goals includes continuing with treatment        ACP Date: 02/27/2023  I engaged the patient in a conversation about advance care planning.  We discussed HCPOA, she wanted to change her current HCPOA and name her daughter Erika Estrada as her agent and her sister and son as backups. We specifically addressed what the goals of care would be moving forward, in light of the patient's change in clinical status, specifically transplant workup.  We did specifically  address the patient's likely prognosis, which is poor w/o a trasnplant.  We explored the patient's values and preferences for future care.  The patient endorses that what is most important right now is to focus on curative/life-prolongation (regardless of treatment burdens)  Accordingly, we have decided that the best plan to meet the patient's goals includes continuing with treatment      Code status:  Full code    Advance directives:HCPOA on file       17 min time was spent on advance care planning, goals of care discussion, emotional support, formulating and communicating prognosis and goals of care, exploring burden/benefit of various approaches of treatment.          Follow up: 1m         SUBJECTIVE:     History obtained from: patient     complaint: No chief complaint on file.      History of Present Illness / Interval History:  Hafsa Hawley is 57 y.o. year old female presenting with heart failure .  Referred to Palliative Care for evaluation and management of physical symptoms. female attended the appointment alone       [facility] admission for ADHF 6/27  CKD plus [place] from cardiorenal syndrome and ADCHF. She now is close to euvolemic. On  dobutamine 5mcg/kg/min and Bumex     She states that she has not been feeling good.  She has abdominal pain significant constipation.  She also describes nausea and bloating.  She shared that her medications are constantly changing so it is difficult for her to keep track of those changes.  She is compliant with medications she is drinking some water because she feels very dry.  She has an appointment with Cardiology on the 17th    -----    She reports that she is feeling okay now that she is on the dobutamine pump.  She feels it has made a difference in her energy level and that she is not eating too much oxygen anymore.  She continues to hope for improvement.  However she is unable to do the things that she enjoys like spending time with her grandkids and going out  because sometimes she is not feeling well.  She feels fatigued she has a lot of pain in her knees and her hips and legs.  She is unable to walk at times.  She also endorsed decreased appetite and significant nausea that improves with Zofran however she needs to take 8 mg for it to work.       -      Disease History:  NICM since 2013 HFrEF (EF 10%) s/p AICD placement  pulmonary hypertension  chronic hypoxic and hypercapnic respiratory failure   Paroxysmal A Fib on Eliquis, Pulmonary Hypertension  Hypertension, Type 2 Diabetes Mellitus     evaluated by our team and presented to committee on 3/9/22.  At the time she was declined for OHT or LVAD due to renal function, lung function and difficulty consistently following up - undergoing evaluation for heart-kidney transplant    Previous experience or exposure to a serious illness: no      External  database queried on 07/11/2023  by Sandra Hernandes .   The results reviewed and considered with the clinical data in the decision whether or not to prescribe a controlled substance.  07/07/2023 07/07/2023   1  Alprazolam 2 Mg Tablet 60.00  30  Ch Par  [id number]   Mara ([id number])  0  8.00 LME T Comm Ins  [place]    07/07/2023 07/06/2023   1  Hydrocodone-Acetamin 7.5-325 90.00  30  Da Nod  [id number]   Mara ([id number])  0  22.50 [place] Comm [place]    06/19/2023 06/12/2023   3  Promethazine-Codeine Solution 177.00  6  Ch Par  [id number]   Och (1974)  0  8.85 MME T Comm Ins  [place]    06/06/2023 03/13/2023   1  Alprazolam 2 Mg Tablet 60.00  30  Ch Par  [id number]   Mara ([id number])  3             Medications:    Current Outpatient Medications:     albuterol-ipratropium (DUO-NEB) 2.5 mg-0.5 mg/3 mL nebulizer solution, Take 3 mLs by nebulization every 6 (six) hours as needed for Wheezing or Shortness of Breath., Disp: 90 mL, Rfl: 3    amiodarone (PACERONE) 200 MG Tab, Take 200 mg by mouth., Disp: , Rfl:     atorvastatin (LIPITOR) 40 MG tablet, Take 1 tablet (40 mg total) by mouth every evening., Disp: 90  tablet, Rfl: 3    b complex vitamins tablet, Take 1 tablet by mouth once daily., Disp: , Rfl:     blood sugar diagnostic (ACCU-CHEK GUIDE TEST STRIPS) Strp, 1 strip by Other route 3 (three) times daily., Disp: 100 each, Rfl: 11    blood-glucose sensor (DEXCOM G7 SENSOR) Evon, 1 Device by Misc.(Non-Drug; Combo Route) route every 10 days., Disp: 3 each, Rfl: 11    bumetanide (BUMEX) 2 MG tablet, Take 2 tablets (4mg total) by mouth in morning and take 1 tablet (2mg total) by mouth in the evening (no later then 5pm)., Disp: 90 tablet, Rfl: 5    cetirizine (ZYRTEC) 5 MG tablet, Take 1 tablet (5 mg total) by mouth daily as needed for Allergies., Disp: , Rfl:     diclofenac sodium (VOLTAREN) 1 % Gel, APPLY 2 GRAMS TOPICALLY 3 (THREE) TIMES DAILY AS NEEDED (PAIN)., Disp: 300 g, Rfl: 1    DOBUTamine (DOBUTREX) 1,000 mg/250 mL (4,000 mcg/mL) infusion, Inject 409 mcg/min into the vein continuous., Disp: , Rfl:     ELIQUIS 5 mg Tab, TAKE 1 TABLET (5 MG TOTAL) BY MOUTH 2 (TWO) TIMES DAILY., Disp: 60 tablet, Rfl: 2    ergocalciferol (ERGOCALCIFEROL) 50,000 unit Cap, Take 1 capsule (50,000 Units total) by mouth every Saturday., Disp: 12 capsule, Rfl: 1    ferrous sulfate (FEOSOL) 325 mg (65 mg iron) Tab tablet, Take 1 tablet (325 mg total) by mouth once daily., Disp: , Rfl: 0    fluticasone propionate (FLONASE) 50 mcg/actuation nasal spray, 2 sprays by Each Nostril route daily as needed for Rhinitis. , Disp: , Rfl:     HYDROcodone-acetaminophen (NORCO) 7.5-325 mg per tablet, Take 1 tablet by mouth every 8 (eight) hours as needed for Pain., Disp: 90 tablet, Rfl: 0    isosorbide-hydrALAZINE 20-37.5 mg (BIDIL) 20-37.5 mg Tab, Take 1 tablet by mouth 3 (three) times daily., Disp: 90 tablet, Rfl: 11    lancets (ACCU-CHEK SOFTCLIX LANCETS) Misc, 1 Device by Misc.(Non-Drug; Combo Route) route 3 (three) times daily., Disp: 100 each, Rfl: 11    mometasone-formoterol (DULERA) 200-5 mcg/actuation inhaler, Inhale 2 puffs into the lungs 2 (two)  times daily. Controller, Disp: 13 g, Rfl: 11    pantoprazole (PROTONIX) 40 MG tablet, TAKE 1 TABLET BY MOUTH DAILY IN THE MORNING, Disp: 30 tablet, Rfl: 11    pregabalin (LYRICA) 50 MG capsule, Take 1 capsule (50 mg total) by mouth every evening., Disp: 60 capsule, Rfl: 5    rOPINIRole (REQUIP) 4 MG tablet, TAKE 1 TABLET (4 MG TOTAL) BY MOUTH ONCE DAILY. PT TAKING 4MG DAILY, Disp: 90 tablet, Rfl: 1    semaglutide (OZEMPIC) 0.25 mg or 0.5 mg (2 mg/3 mL) pen injector, Inject 0.5 mg into the skin every 7 days., Disp: 1 each, Rfl: 11    sertraline (ZOLOFT) 50 MG tablet, Take 1 tablet (50 mg total) by mouth once daily., Disp: 30 tablet, Rfl: 3    SPIRIVA WITH HANDIHALER 18 mcg inhalation capsule, Inhale 1 capsule (18 mcg total) into the lungs once daily., Disp: 30 capsule, Rfl: 5    VENTOLIN HFA 90 mcg/actuation inhaler, Inhale 2 puffs into the lungs every 6 (six) hours as needed for Shortness of Breath or Wheezing., Disp: 18 g, Rfl: 11  No current facility-administered medications for this visit.    Facility-Administered Medications Ordered in Other Visits:     0.9%  NaCl infusion, , Intravenous, Continuous, Corinna Hayes NP, New Bag at 05/23/19 0745    vancomycin in dextrose 5 % 1 gram/250 mL IVPB 1,000 mg, 1,000 mg, Intravenous, On Call Procedure, Corinna Hayes NP, 1,000 mg at 05/23/19 0736      Past Medical History:   Diagnosis Date    Anemia     Arthritis     Atrial fibrillation     OAC    Chronic respiratory failure with hypoxia, on home oxygen therapy     2L with activity, off at rest.  Per Pulm  no overt evidence of ILD or COPD on PFTs and CT to explain O2 needs.    CKD (chronic kidney disease), stage IV 05/08/2018    Congestive heart failure     s/p AICD placement,    Deep vein thrombosis     Depression     elevated bilirubin d/t Gilbert's syndrome     confirmed by [place] genetic testing, evaluated by hepatology    Encounter for blood transfusion     GERD (gastroesophageal reflux disease)      Hypertension     Pheochromocytoma, malignant     Right kidney mass     Sleep apnea     Thalassemia trait, alpha     Thyroid disease     Type 2 diabetes mellitus with hyperglycemia, without long-term current use of insulin 08/13/2020     Past Surgical History:   Procedure Laterality Date    APPENDECTOMY      BONE MARROW BIOPSY      CARDIAC DEFIBRILLATOR PLACEMENT Left 12/2016    CARDIAC ELECTROPHYSIOLOGY MAPPING AND ABLATION      CARDIAC ELECTROPHYSIOLOGY MAPPING AND ABLATION      COLONOSCOPY N/A 5/6/2022    Procedure: COLONOSCOPY;  Surgeon: Arely Betancourt MD;  Location: [facility] ENDO (2ND FLR);  Service: Endoscopy;  Laterality: N/A;  heart transplant candidate/ EF 25% - 2nd floor/ defib - Biotronik - ERW  Eliquis - per Dr. Cortez with [facility] [place], Pt ok to hold Eliquis x 2 days prior-see media tab-outside correspondence dated 12/30/21  - ERW  verbal instructions/portal instructions/email instructions - s    EYE SURGERY      due to running tears    FRACTURE SURGERY Left     hand 5th digit    HYSTERECTOMY      KNEE SURGERY Left 2016    hematoma    LIVER BIOPSY  10/24/2018    Minimal steatosis, predominantly macrovesicular, 1%, Minimal nonspecific portal inflammation, no fibrosis. No findings on biopsy to explain elevated bilirubin levels. Could be d/t Gilbert's =?- hemolysis    RIGHT HEART CATHETERIZATION Right 12/07/2021    Procedure: INSERTION, CATHETER, RIGHT HEART;  Surgeon: Irving Cardenas MD;  Location: [facility] CATH LAB;  Service: Cardiology;  Laterality: Right;    RIGHT HEART CATHETERIZATION Right 12/19/2022    Procedure: INSERTION, CATHETER, RIGHT HEART;  Surgeon: Burke Camilo MD;  Location: [facility] CATH LAB;  Service: Cardiology;  Laterality: Right;    RIGHT HEART CATHETERIZATION Right 3/29/2023    Procedure: INSERTION, CATHETER, RIGHT HEART;  Surgeon: Katie Liriano DO;  Location: [facility] CATH LAB;  Service: Cardiology;  Laterality: Right;    TRANSJUGULAR BIOPSY OF LIVER N/A 10/24/2018    Procedure:  "BIOPSY, LIVER, TRANSJUGULAR APPROACH;  Surgeon: Carmen Diagnostic Provider;  Location: [facility] OR [address] [place] [address];  Service: Radiology;  Laterality: N/A;     Family History   Problem Relation Age of Onset    Cancer Mother         pancreatic CA early 50's    Heart disease Father          MI in late 50's    Hypertension Father     Heart attack Father     Heart disease Sister     Heart disease Brother     Cirrhosis Brother         alcoholic    Heart disease Sister     Heart disease Brother     Hypertension Brother     Diabetes Brother      Review of patient's allergies indicates:   Allergen Reactions    Penicillins Hives and Other (See Comments)    Iodinated contrast media Nausea And Vomiting    Oxycodone-acetaminophen Other (See Comments)     Nausea, Dizziness, Anxiety.  "I don't like how it makes me feel."   Given Hydromorphone 0.5mg IVP  Without problems.  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 Metabolism of other drugs not listed here may also be impacted by UGT1A1 enzyme activity.       Tramadol Nausea And Vomiting and Other (See Comments)     Other reaction(s): Other (See Comments)    Valsartan Other (See Comments)       OBJECTIVE:     [place] Symptom Assessment System  Pain Score: Ten (Hips, legs, and feet)  Tiredness Score: 0  Nausea Score: 4  Depression Score: 0  Anxiety Score: 0  Drowsiness Score: 0  Appetite Score: 5 (Up and down)  Wellbeing Score: 2  Dyspnea Score: " "7 (Tightness in the chest and ribs. Also in the abdomen.)            Review of Symptoms      Symptom Assessment (ESAS 0-10 Scale)  Pain:  10  Dyspnea:  7  Anxiety:  0  Nausea:  4  Depression:  0  Anorexia:  5  Fatigue:  0  Insomnia:  0  Restlessness:  0  Agitation:  0     CAM / Delirium:  Negative  Constipation:  Positive  Diarrhea:  Negative    Anxiety:  Is not nervous/anxious  Constipation:  Constipation    Comments:  Miralax    Pain Assessment:    Location(s): leg    Leg       Location: bilateral        Quality: Aching        Quantity: 10/10 in intensity        Chronicity: Onset 1 week(s) ago, rapidly worsening        Aggravating Factors: None        Alleviating Factors: None        Associated Symptoms: None    Performance Status:  60    Living Arrangements:  Lives with family    Psychosocial/Cultural:   See Palliative Psychosocial Note: Yes  Lives with  and granddaughter. Pt lives in a mobile home with 5 NIRAJ. Pt also has support from her two sisters Benoit Baum [phone], Jeanie Jaimes [phone]. Pt is mostly independent with ADL's, but has a RW, [place] and home oxygen, which she states she uses "as needed".   dgtr Erika Estrada, 33, [place], son Miguel Baum, 35  **Primary  to Follow**  Palliative Care  Consult: Yes    Spiritual:  F - Damaris and Belief:  Confucianism     Advance Care Planning   Advance Directives:   Living Will: No    LaPOST: No    Do Not Resuscitate Status: No    Medical Power of : Yes    Agent's Name:  Erika Estrada    Decision Making:  Patient answered questions  Goals of Care: What is most important right now is to focus on curative/life-prolongation (regardless of treatment burdens), remaining as independent as possible, symptom/pain control. Accordingly, we have decided that the best plan to meet the patient's goals includes continuing with treatment.            Physical Exam:  Vitals:    Physical Exam  Constitutional:       General: She is not in " acute distress.     Comments: Sitting in a chair    HENT:      Head: Normocephalic and atraumatic.   Eyes:      General: No scleral icterus.  Pulmonary:      Effort: Pulmonary effort is normal. No respiratory distress.   Abdominal:      General: There is no distension.   Musculoskeletal:      Cervical back: Neck supple.   Skin:     Findings: No rash.   Neurological:      Mental Status: She is alert and oriented to person, place, and time.   Psychiatric:         Mood and Affect: Mood normal. Affect is tearful.         Labs:  CBC:   WBC   Date Value Ref Range Status   07/10/2023 3.92 3.90 - 12.70 K/uL Final       Hemoglobin   Date Value Ref Range Status   07/10/2023 9.4 (L) 12.0 - 16.0 g/dL Final       POC Hematocrit   Date Value Ref Range Status   12/07/2021 45 36 - 54 %PCV Final     Hematocrit   Date Value Ref Range Status   07/10/2023 31.3 (L) 37.0 - 48.5 % Final       MCV   Date Value Ref Range Status   07/10/2023 59 (L) 82 - 98 fL Final       Platelets   Date Value Ref Range Status   07/10/2023 180 150 - 450 K/uL Final       LFT:   Lab Results   Component Value Date    AST 22 07/10/2023    ALKPHOS 60 07/10/2023    BILITOT 1.5 (H) 07/10/2023       Albumin:   Albumin   Date Value Ref Range Status   07/10/2023 3.8 3.5 - 5.2 g/dL Final     Protein:   Total Protein   Date Value Ref Range Status   07/10/2023 7.1 6.0 - 8.4 g/dL Final         Radiology:I have reviewed all pertinent imaging results/findings within the past 24 hours.  Results for orders placed during the hospital encounter of 03/26/23    Echo    Interpretation Summary  · The left ventricle is severely enlarged with eccentric hypertrophy and severely decreased systolic function. The estimated ejection fraction is 15%.  · Normal right ventricular size with moderately reduced right ventricular systolic function.  · Grade II left ventricular diastolic dysfunction.  · Biatrial enlargement.  · Mild mitral regurgitation.  · Small pericardial effusion.  · The  estimated PA systolic pressure is 51 mmHg (by tricuspid regurgitation velocity). The estimated mean PA pressure is 39mm Hg.  · Intermediate central venous pressure (8 mmHg).        I spent a total of 43 minutes on the day of the visit. This includes face to face time in discussion of goals of care, symptom assessment, coordination of care and emotional support.  This also includes non-face to face time preparing to see the patient (eg, review of tests/imaging), obtaining and/or reviewing separately obtained history, documenting clinical information in the electronic or other health record, independently interpreting results and communicating results to the patient/family/caregiver, or care coordinator.      17 minutes discussing ACP      This note was partially created using [employer]-VIPS Voice Recognition software. Typographical and content errors may occur with this process. While efforts are made to detect and correct such errors, in some cases errors will persist. For this reason, wording in this document should be considered in the proper context and not strictly verbatim.      Signature: Sandra Hernandes MD

## 2023-07-11 NOTE — TELEPHONE ENCOUNTER
7/11/2023    Received written order from MD Alisa Camilo via clinic note 5/29/23; patient is no longer under considerations for any advanced therapies, patient to follow up with local cardiologist MD Cortez.    0922 AM - Spoke with Dr. Cortez's RN Carolann via phone to discuss transfer of care and management of palliative inotrope's per MD Alisa Camilo.  Carolann RN confirmed last clinic visit of 6/11/23  and that she will reach out to patient to schedule hospital follow up appointment on 7/17/23.  Confirmed fax number within clinic; faxed MD Alisa Camilo 5/29 clinic note, Infusion company information, infusion orders, and weekly lab orders. Attempted to call [facility]; left a message for return call.      Spoke with Brien Pharmacist from [employer]; discussed transfer of care for inotrope infusion and weekly labs from MD Alisa Camilo to local cardiologist, MD Cortez.  Brien confirms he will communicate with patients home health agency.  I communicated contact information, fax number, and Carolann RN for MD Cortez's office.      1400 - Spoke with Brien verbalized faxing MD Cortez's office orders for transfer of care.  Spoke with Carolann RN, confirmed MD Cortez signed orders for inotrope infusion and weekly labs.    Patient Snapshot updated to reflect transfer of care, MD Alisa Camilo made aware of the above.

## 2023-07-11 NOTE — Clinical Note
Good afternoon,  She sees the transplant team here at [facility] she is not a candidate for advanced therapies.  She is hoping to get a transplant evaluation in [place] I am wondering if there is a process within the transplant clinic to place referral and send the records over for her? Or does the pt needs to reach out directly?  Thanks  Sandra

## 2023-07-12 ENCOUNTER — TELEPHONE (OUTPATIENT)
Dept: TRANSPLANT | Facility: CLINIC | Age: 58
End: 2023-07-12
Payer: MEDICAID

## 2023-07-12 ENCOUNTER — TELEPHONE (OUTPATIENT)
Dept: PALLIATIVE MEDICINE | Facility: CLINIC | Age: 58
End: 2023-07-12
Payer: MEDICAID

## 2023-07-12 NOTE — TELEPHONE ENCOUNTER
7/12/223    Received return phone call from [facility] RN; see ERIC Mensah 7/11 NN.      Discussed transfer of care from MD SABINE Camilo to MD CHEVY Cortez. Shared contact information for MD Crotez's office and nurse Carolann.  Shared contact information for [facility] Pharmacist, Brien and communication with the infusion company to ensure continuation of care.  Informed RN of transfer of care effective 7/11/2023.  RN voices understanding and repeats back contact information shared.

## 2023-07-12 NOTE — TELEPHONE ENCOUNTER
Advised that she can increase to 1.5 tablets of the hydrocodone 7.5mg every 8 hours as needed for pain per Dr. Michel' instructions. Verbalized understanding.

## 2023-07-17 ENCOUNTER — INFUSION (OUTPATIENT)
Dept: INFUSION THERAPY | Facility: HOSPITAL | Age: 58
End: 2023-07-17
Attending: INTERNAL MEDICINE
Payer: MEDICAID

## 2023-07-17 VITALS
TEMPERATURE: 97 F | HEART RATE: 77 BPM | RESPIRATION RATE: 18 BRPM | DIASTOLIC BLOOD PRESSURE: 73 MMHG | SYSTOLIC BLOOD PRESSURE: 131 MMHG

## 2023-07-17 NOTE — PROGRESS NOTES
Right upper arm PICC line dressing change done using sterile technique.  No sign of infection noted  Bio patch, stat lock device and tegaderm reapplied.   Tolerated well.  Dc to home in stable condition

## 2023-07-18 ENCOUNTER — TELEPHONE (OUTPATIENT)
Dept: ELECTROPHYSIOLOGY | Facility: CLINIC | Age: 58
End: 2023-07-18
Payer: MEDICAID

## 2023-07-18 NOTE — PROGRESS NOTES
Subjective:   Patient ID:  Hafsa Hawley is a 57 y.o. female who presents for follow-up of Cardiomyopathy      HPI 58 yo female with SVT (RFA 5/2019), NICM, CHF, HTN, Sleep Apnea, paroxysmal atrial fibrillation (on eliquis), Fibromyalgia, ICD, alpha thalassemia.     Background:     Primary cardiologist is Dr. Cortez.  Longstanding history of non-ischemic cardiomyopathy.  [facility] 11/15/15 EF 30-35% with normal coronary arteries.  Has been on optimal medical therapy.  Admitted to [facility] 9/16 with acute decompensated Heart failure. Diuresed 12 liters.  Echo 8/24/16 EF 20%.  Repeat echo 10/17/16 EF 30-35% with small pericardial effusion.  ICD implanted 12/2/16 (VDD single pass lead).     Developed recurrent SVT.     Underwent successful slow pathway modification on 5/23/2019.   Patient hospitalized with covid-19 on 8/12/2020-8/16/2020. Was hospitalized her coreg was decreased due to bradycardia.   She was diagnosed with new-onset DM during hospitalization. Started on metformin.     Has had recurrent HF     Update:  Admitted twice in the past 3 months with HF and acute renal failure. Seen by [facility], deemed to not be a candidate for advanced options. Palliative care consulted. Now on home Dobutamine. Going to [place] for a second opinion.  Continues to note weakness, nausea. Denies ICD shocks.    Device interrogation reveals stable function of the lead, RV pacing 0%      Review of Systems   Constitutional: Negative. Negative for fever and malaise/fatigue.   HENT:  Negative for congestion and sore throat.    Cardiovascular:  Negative for chest pain, dyspnea on exertion, irregular heartbeat, leg swelling, near-syncope, orthopnea, palpitations, paroxysmal nocturnal dyspnea and syncope.   Respiratory:  Positive for shortness of breath. Negative for cough.    Gastrointestinal:  Negative for abdominal pain, constipation and diarrhea.   Neurological:  Negative for dizziness, light-headedness and weakness.    Psychiatric/Behavioral:  Negative for depression. The patient is not nervous/anxious.    All other systems reviewed and are negative.    Objective:   Physical Exam  Constitutional:       Appearance: She is well-developed.   Eyes:      General: No scleral icterus.     Conjunctiva/sclera: Conjunctivae normal.   Neck:      Vascular: No JVD.      Trachea: No tracheal deviation.   Cardiovascular:      Rate and Rhythm: Normal rate and regular rhythm.      Chest Wall: PMI is not displaced.   Pulmonary:      Effort: Pulmonary effort is normal. No respiratory distress.      Breath sounds: Normal breath sounds.   Abdominal:      Palpations: Abdomen is soft.      Tenderness: There is no abdominal tenderness.   Musculoskeletal:         General: No tenderness.   Skin:     General: Skin is warm and dry.      Findings: No rash.   Neurological:      Mental Status: She is alert and oriented to person, place, and time.   Psychiatric:         Behavior: Behavior normal.       Assessment:      1. NICM (nonischemic cardiomyopathy)    2. ICD (implantable cardioverter-defibrillator) in place    3. Acute on chronic combined systolic and diastolic heart failure    4. Paroxysmal A-fib    5. Restrictive lung disease    6. Chronic respiratory failure with hypoxia and hypercapnia    7. CKD (chronic kidney disease), stage IV    8. Type 2 diabetes mellitus with stage 4 chronic kidney disease, with long-term current use of insulin    9. MELISSA (obstructive sleep apnea)        Plan:       Continue current settings and medications.  F/u with monitoring as scheduled, with me in one year.

## 2023-07-19 ENCOUNTER — OFFICE VISIT (OUTPATIENT)
Dept: ELECTROPHYSIOLOGY | Facility: CLINIC | Age: 58
End: 2023-07-19
Payer: MEDICAID

## 2023-07-19 ENCOUNTER — CLINICAL SUPPORT (OUTPATIENT)
Dept: CARDIOLOGY | Facility: HOSPITAL | Age: 58
End: 2023-07-19
Attending: INTERNAL MEDICINE
Payer: MEDICAID

## 2023-07-19 VITALS — SYSTOLIC BLOOD PRESSURE: 148 MMHG | HEART RATE: 68 BPM | DIASTOLIC BLOOD PRESSURE: 80 MMHG

## 2023-07-19 DIAGNOSIS — I42.8 NICM (NONISCHEMIC CARDIOMYOPATHY): ICD-10-CM

## 2023-07-19 DIAGNOSIS — I48.0 PAROXYSMAL A-FIB: ICD-10-CM

## 2023-07-19 DIAGNOSIS — I42.8 NICM (NONISCHEMIC CARDIOMYOPATHY): Primary | Chronic | ICD-10-CM

## 2023-07-19 DIAGNOSIS — N18.4 TYPE 2 DIABETES MELLITUS WITH STAGE 4 CHRONIC KIDNEY DISEASE, WITH LONG-TERM CURRENT USE OF INSULIN: Chronic | ICD-10-CM

## 2023-07-19 DIAGNOSIS — J96.12 CHRONIC RESPIRATORY FAILURE WITH HYPOXIA AND HYPERCAPNIA: Chronic | ICD-10-CM

## 2023-07-19 DIAGNOSIS — N18.4 CKD (CHRONIC KIDNEY DISEASE), STAGE IV: Chronic | ICD-10-CM

## 2023-07-19 DIAGNOSIS — J96.11 CHRONIC RESPIRATORY FAILURE WITH HYPOXIA AND HYPERCAPNIA: Chronic | ICD-10-CM

## 2023-07-19 DIAGNOSIS — E11.22 TYPE 2 DIABETES MELLITUS WITH STAGE 4 CHRONIC KIDNEY DISEASE, WITH LONG-TERM CURRENT USE OF INSULIN: Chronic | ICD-10-CM

## 2023-07-19 DIAGNOSIS — Z95.810 ICD (IMPLANTABLE CARDIOVERTER-DEFIBRILLATOR) IN PLACE: Chronic | ICD-10-CM

## 2023-07-19 DIAGNOSIS — G47.33 OSA (OBSTRUCTIVE SLEEP APNEA): Chronic | ICD-10-CM

## 2023-07-19 DIAGNOSIS — I50.43 ACUTE ON CHRONIC COMBINED SYSTOLIC AND DIASTOLIC HEART FAILURE: ICD-10-CM

## 2023-07-19 DIAGNOSIS — Z79.4 TYPE 2 DIABETES MELLITUS WITH STAGE 4 CHRONIC KIDNEY DISEASE, WITH LONG-TERM CURRENT USE OF INSULIN: Chronic | ICD-10-CM

## 2023-07-19 DIAGNOSIS — J98.4 RESTRICTIVE LUNG DISEASE: ICD-10-CM

## 2023-07-19 PROCEDURE — 4010F ACE/ARB THERAPY RXD/TAKEN: CPT | Mod: CPTII,,, | Performed by: INTERNAL MEDICINE

## 2023-07-19 PROCEDURE — 1111F PR DISCHARGE MEDS RECONCILED W/ CURRENT OUTPATIENT MED LIST: ICD-10-PCS | Mod: CPTII,,, | Performed by: INTERNAL MEDICINE

## 2023-07-19 PROCEDURE — 93282 CARDIAC DEVICE CHECK - IN CLINIC & HOSPITAL: ICD-10-PCS | Mod: 26,,, | Performed by: INTERNAL MEDICINE

## 2023-07-19 PROCEDURE — 3079F PR MOST RECENT DIASTOLIC BLOOD PRESSURE 80-89 MM HG: ICD-10-PCS | Mod: CPTII,,, | Performed by: INTERNAL MEDICINE

## 2023-07-19 PROCEDURE — 4010F PR ACE/ARB THEARPY RXD/TAKEN: ICD-10-PCS | Mod: CPTII,,, | Performed by: INTERNAL MEDICINE

## 2023-07-19 PROCEDURE — 99999 PR PBB SHADOW E&M-EST. PATIENT-LVL I: ICD-10-PCS | Mod: PBBFAC,,, | Performed by: INTERNAL MEDICINE

## 2023-07-19 PROCEDURE — 3079F DIAST BP 80-89 MM HG: CPT | Mod: CPTII,,, | Performed by: INTERNAL MEDICINE

## 2023-07-19 PROCEDURE — 99215 OFFICE O/P EST HI 40 MIN: CPT | Mod: S$PBB,,, | Performed by: INTERNAL MEDICINE

## 2023-07-19 PROCEDURE — 99215 PR OFFICE/OUTPT VISIT, EST, LEVL V, 40-54 MIN: ICD-10-PCS | Mod: S$PBB,,, | Performed by: INTERNAL MEDICINE

## 2023-07-19 PROCEDURE — 93282 PRGRMG EVAL IMPLANTABLE DFB: CPT | Mod: 26,,, | Performed by: INTERNAL MEDICINE

## 2023-07-19 PROCEDURE — 3077F PR MOST RECENT SYSTOLIC BLOOD PRESSURE >= 140 MM HG: ICD-10-PCS | Mod: CPTII,,, | Performed by: INTERNAL MEDICINE

## 2023-07-19 PROCEDURE — 93282 PRGRMG EVAL IMPLANTABLE DFB: CPT

## 2023-07-19 PROCEDURE — 3077F SYST BP >= 140 MM HG: CPT | Mod: CPTII,,, | Performed by: INTERNAL MEDICINE

## 2023-07-19 PROCEDURE — 99211 OFF/OP EST MAY X REQ PHY/QHP: CPT | Mod: PBBFAC | Performed by: INTERNAL MEDICINE

## 2023-07-19 PROCEDURE — 3044F PR MOST RECENT HEMOGLOBIN A1C LEVEL <7.0%: ICD-10-PCS | Mod: CPTII,,, | Performed by: INTERNAL MEDICINE

## 2023-07-19 PROCEDURE — 1111F DSCHRG MED/CURRENT MED MERGE: CPT | Mod: CPTII,,, | Performed by: INTERNAL MEDICINE

## 2023-07-19 PROCEDURE — 99999 PR PBB SHADOW E&M-EST. PATIENT-LVL I: CPT | Mod: PBBFAC,,, | Performed by: INTERNAL MEDICINE

## 2023-07-19 PROCEDURE — 3044F HG A1C LEVEL LT 7.0%: CPT | Mod: CPTII,,, | Performed by: INTERNAL MEDICINE

## 2023-07-20 ENCOUNTER — PATIENT OUTREACH (OUTPATIENT)
Dept: EMERGENCY MEDICINE | Facility: HOSPITAL | Age: 58
End: 2023-07-20
Payer: MEDICAID

## 2023-07-20 NOTE — PROGRESS NOTES
Pt stated she is doing okay and has everything taken care of. Pt has no needs.    ED navigator ensured there was nothing she could help patient with. ED navigator reminded patient to reach out if there is ever anything she can assist with. ED navigator will follow-up with patient on/around 9/7/2023.    Beulah Figueroa  ED Navigator- [place]  [phone]

## 2023-07-20 NOTE — PROGRESS NOTES
Pt is unreachable. Encounter will be closed, and pt should be contacted if/when he/she comes back to the ER again for a F/U call.    Beulah Figueroa  ED Navigator- [place]  [phone]

## 2023-07-24 ENCOUNTER — INFUSION (OUTPATIENT)
Dept: INFUSION THERAPY | Facility: HOSPITAL | Age: 58
End: 2023-07-24
Attending: INTERNAL MEDICINE
Payer: MEDICAID

## 2023-07-24 VITALS
SYSTOLIC BLOOD PRESSURE: 106 MMHG | DIASTOLIC BLOOD PRESSURE: 62 MMHG | HEART RATE: 65 BPM | RESPIRATION RATE: 18 BRPM | TEMPERATURE: 98 F

## 2023-07-24 DIAGNOSIS — I50.42 CHRONIC COMBINED SYSTOLIC AND DIASTOLIC HEART FAILURE: ICD-10-CM

## 2023-07-24 DIAGNOSIS — I11.0 HYPERTENSIVE HEART DISEASE WITH CONGESTIVE HEART FAILURE, UNSPECIFIED HEART FAILURE TYPE: ICD-10-CM

## 2023-07-24 DIAGNOSIS — E11.9 DIABETES MELLITUS WITHOUT COMPLICATION: ICD-10-CM

## 2023-07-24 DIAGNOSIS — N18.4 CHRONIC KIDNEY DISEASE, STAGE IV (SEVERE): Primary | ICD-10-CM

## 2023-07-24 PROCEDURE — 36591 DRAW BLOOD OFF VENOUS DEVICE: CPT

## 2023-07-24 NOTE — PROGRESS NOTES
1300  Blood drawn from PICC line.  Good blood return noted.  Flushed with saline.  Pt will flush with heparin   Right upper arm PICC line dressing change done using sterile technique.  No signs of infection noted.  Stat lock device, bio patch and tegaderm applied.  Tolerated well  Dc to home in stable condition

## 2023-07-25 ENCOUNTER — CLINICAL SUPPORT (OUTPATIENT)
Dept: CARDIOLOGY | Facility: HOSPITAL | Age: 58
End: 2023-07-25
Payer: MEDICAID

## 2023-07-25 DIAGNOSIS — Z95.810 PRESENCE OF AUTOMATIC (IMPLANTABLE) CARDIAC DEFIBRILLATOR: ICD-10-CM

## 2023-07-25 PROCEDURE — 93296 REM INTERROG EVL PM/IDS: CPT | Performed by: INTERNAL MEDICINE

## 2023-07-25 PROCEDURE — 93295 CARDIAC DEVICE CHECK - REMOTE: ICD-10-PCS | Mod: ,,, | Performed by: INTERNAL MEDICINE

## 2023-07-25 PROCEDURE — 93295 DEV INTERROG REMOTE 1/2/MLT: CPT | Mod: ,,, | Performed by: INTERNAL MEDICINE

## 2023-07-31 ENCOUNTER — INFUSION (OUTPATIENT)
Dept: INFUSION THERAPY | Facility: HOSPITAL | Age: 58
End: 2023-07-31
Attending: INTERNAL MEDICINE
Payer: MEDICAID

## 2023-07-31 VITALS
SYSTOLIC BLOOD PRESSURE: 123 MMHG | TEMPERATURE: 98 F | DIASTOLIC BLOOD PRESSURE: 70 MMHG | RESPIRATION RATE: 18 BRPM | HEART RATE: 73 BPM

## 2023-07-31 DIAGNOSIS — I11.0 HYPERTENSIVE HEART DISEASE WITH CONGESTIVE HEART FAILURE, UNSPECIFIED HEART FAILURE TYPE: ICD-10-CM

## 2023-07-31 DIAGNOSIS — E11.9 DIABETES MELLITUS WITHOUT COMPLICATION: ICD-10-CM

## 2023-07-31 DIAGNOSIS — N18.4 CHRONIC KIDNEY DISEASE, STAGE IV (SEVERE): Primary | ICD-10-CM

## 2023-07-31 DIAGNOSIS — I50.42 CHRONIC COMBINED SYSTOLIC AND DIASTOLIC HEART FAILURE: ICD-10-CM

## 2023-07-31 LAB
ALBUMIN SERPL BCP-MCNC: 3.9 G/DL (ref 3.5–5.2)
ALP SERPL-CCNC: 60 U/L (ref 55–135)
ALT SERPL W/O P-5'-P-CCNC: 15 U/L (ref 10–44)
ANION GAP SERPL CALC-SCNC: 13 MMOL/L (ref 8–16)
AST SERPL-CCNC: 19 U/L (ref 10–40)
BASOPHILS # BLD AUTO: 0.03 K/UL (ref 0–0.2)
BASOPHILS NFR BLD: 0.7 % (ref 0–1.9)
BILIRUB SERPL-MCNC: 1.3 MG/DL (ref 0.1–1)
BNP SERPL-MCNC: 1228 PG/ML (ref 0–99)
BUN SERPL-MCNC: 43 MG/DL (ref 6–20)
CALCIUM SERPL-MCNC: 9.6 MG/DL (ref 8.7–10.5)
CHLORIDE SERPL-SCNC: 108 MMOL/L (ref 95–110)
CO2 SERPL-SCNC: 23 MMOL/L (ref 23–29)
CREAT SERPL-MCNC: 2.4 MG/DL (ref 0.5–1.4)
DIFFERENTIAL METHOD: ABNORMAL
EOSINOPHIL # BLD AUTO: 0.1 K/UL (ref 0–0.5)
EOSINOPHIL NFR BLD: 2.2 % (ref 0–8)
ERYTHROCYTE [DISTWIDTH] IN BLOOD BY AUTOMATED COUNT: 27 % (ref 11.5–14.5)
EST. GFR  (NO RACE VARIABLE): 23 ML/MIN/1.73 M^2
GLUCOSE SERPL-MCNC: 76 MG/DL (ref 70–110)
HCT VFR BLD AUTO: 30.6 % (ref 37–48.5)
HGB BLD-MCNC: 9.3 G/DL (ref 12–16)
IMM GRANULOCYTES # BLD AUTO: 0.02 K/UL (ref 0–0.04)
IMM GRANULOCYTES NFR BLD AUTO: 0.4 % (ref 0–0.5)
LYMPHOCYTES # BLD AUTO: 1 K/UL (ref 1–4.8)
LYMPHOCYTES NFR BLD: 22.1 % (ref 18–48)
MAGNESIUM SERPL-MCNC: 1.6 MG/DL (ref 1.6–2.6)
MCH RBC QN AUTO: 17.6 PG (ref 27–31)
MCHC RBC AUTO-ENTMCNC: 30.4 G/DL (ref 32–36)
MCV RBC AUTO: 58 FL (ref 82–98)
MONOCYTES # BLD AUTO: 0.5 K/UL (ref 0.3–1)
MONOCYTES NFR BLD: 10.3 % (ref 4–15)
NEUTROPHILS # BLD AUTO: 2.9 K/UL (ref 1.8–7.7)
NEUTROPHILS NFR BLD: 64.3 % (ref 38–73)
NRBC BLD-RTO: 0 /100 WBC
PHOSPHATE SERPL-MCNC: 3.4 MG/DL (ref 2.7–4.5)
PLATELET # BLD AUTO: 172 K/UL (ref 150–450)
PMV BLD AUTO: ABNORMAL FL (ref 9.2–12.9)
POTASSIUM SERPL-SCNC: 3.5 MMOL/L (ref 3.5–5.1)
PROT SERPL-MCNC: 7 G/DL (ref 6–8.4)
RBC # BLD AUTO: 5.27 M/UL (ref 4–5.4)
SODIUM SERPL-SCNC: 144 MMOL/L (ref 136–145)
WBC # BLD AUTO: 4.47 K/UL (ref 3.9–12.7)

## 2023-07-31 PROCEDURE — 84100 ASSAY OF PHOSPHORUS: CPT | Performed by: INTERNAL MEDICINE

## 2023-07-31 PROCEDURE — 36591 DRAW BLOOD OFF VENOUS DEVICE: CPT

## 2023-07-31 PROCEDURE — 83880 ASSAY OF NATRIURETIC PEPTIDE: CPT | Performed by: INTERNAL MEDICINE

## 2023-07-31 PROCEDURE — 80053 COMPREHEN METABOLIC PANEL: CPT | Performed by: INTERNAL MEDICINE

## 2023-07-31 PROCEDURE — 85025 COMPLETE CBC W/AUTO DIFF WBC: CPT | Performed by: INTERNAL MEDICINE

## 2023-07-31 PROCEDURE — 83735 ASSAY OF MAGNESIUM: CPT | Performed by: INTERNAL MEDICINE

## 2023-07-31 NOTE — PROGRESS NOTES
1320  blood drawn from PICC line .   Right upper arm PICC line dressing change done using sterile technique.  No signs of infection noted.  Tolerated well.

## 2023-08-02 DIAGNOSIS — M25.532 LEFT WRIST PAIN: ICD-10-CM

## 2023-08-02 DIAGNOSIS — M77.12 LEFT LATERAL EPICONDYLITIS: ICD-10-CM

## 2023-08-02 DIAGNOSIS — M67.40 GANGLION CYST: ICD-10-CM

## 2023-08-02 RX ORDER — DICLOFENAC SODIUM 10 MG/G
GEL TOPICAL
Qty: 300 G | Refills: 1 | Status: SHIPPED | OUTPATIENT
Start: 2023-08-02 | End: 2023-11-06 | Stop reason: SDUPTHER

## 2023-08-02 NOTE — TELEPHONE ENCOUNTER
Refill Routing Note     Refill Routing Note   Medication(s) are not appropriate for processing by [facility] for the following reason(s):      Medication outside of protocol    [facility] action(s):  Route Care Due:  None identified     Medication Therapy Plan: no pcp listed on profile      Appointments  past 12m or future 3m with PCP    Date Provider   Last Visit   6/6/2023 Cristobal Ann MD   Next Visit   8/9/2023 Cristobal Ann MD   ED visits in past 90 days: 2        Note composed:8:00 AM 08/02/2023

## 2023-08-03 ENCOUNTER — TELEPHONE (OUTPATIENT)
Dept: FAMILY MEDICINE | Facility: CLINIC | Age: 58
End: 2023-08-03
Payer: MEDICAID

## 2023-08-03 DIAGNOSIS — M51.36 LUMBAR DEGENERATIVE DISC DISEASE: Primary | ICD-10-CM

## 2023-08-03 DIAGNOSIS — G89.29 CHRONIC PAIN OF RIGHT KNEE: ICD-10-CM

## 2023-08-03 DIAGNOSIS — M25.561 CHRONIC PAIN OF RIGHT KNEE: ICD-10-CM

## 2023-08-03 DIAGNOSIS — R06.00 DYSPNEA, UNSPECIFIED TYPE: ICD-10-CM

## 2023-08-03 DIAGNOSIS — R52 PAIN: ICD-10-CM

## 2023-08-03 RX ORDER — HYDROCODONE BITARTRATE AND ACETAMINOPHEN 10; 325 MG/1; MG/1
1 TABLET ORAL EVERY 8 HOURS PRN
Qty: 90 TABLET | Refills: 0 | Status: SHIPPED | OUTPATIENT
Start: 2023-08-03 | End: 2023-09-02

## 2023-08-03 RX ORDER — LIDOCAINE 50 MG/G
1 PATCH TOPICAL DAILY
Qty: 30 PATCH | Refills: 2 | Status: SHIPPED | OUTPATIENT
Start: 2023-08-03 | End: 2023-11-06 | Stop reason: SDUPTHER

## 2023-08-07 ENCOUNTER — INFUSION (OUTPATIENT)
Dept: INFUSION THERAPY | Facility: HOSPITAL | Age: 58
End: 2023-08-07
Attending: INTERNAL MEDICINE
Payer: MEDICAID

## 2023-08-07 ENCOUNTER — OFFICE VISIT (OUTPATIENT)
Dept: ENDOCRINOLOGY | Facility: CLINIC | Age: 58
End: 2023-08-07
Payer: MEDICAID

## 2023-08-07 VITALS
SYSTOLIC BLOOD PRESSURE: 128 MMHG | WEIGHT: 180.75 LBS | RESPIRATION RATE: 18 BRPM | DIASTOLIC BLOOD PRESSURE: 74 MMHG | BODY MASS INDEX: 32.03 KG/M2 | HEIGHT: 63 IN | HEART RATE: 72 BPM

## 2023-08-07 VITALS — DIASTOLIC BLOOD PRESSURE: 64 MMHG | RESPIRATION RATE: 18 BRPM | SYSTOLIC BLOOD PRESSURE: 98 MMHG | HEART RATE: 79 BPM

## 2023-08-07 DIAGNOSIS — Z79.4 TYPE 2 DIABETES MELLITUS WITH STAGE 4 CHRONIC KIDNEY DISEASE, WITH LONG-TERM CURRENT USE OF INSULIN: Primary | ICD-10-CM

## 2023-08-07 DIAGNOSIS — E11.22 TYPE 2 DIABETES MELLITUS WITH STAGE 4 CHRONIC KIDNEY DISEASE, WITH LONG-TERM CURRENT USE OF INSULIN: ICD-10-CM

## 2023-08-07 DIAGNOSIS — E11.9 DIABETES MELLITUS WITHOUT COMPLICATION: ICD-10-CM

## 2023-08-07 DIAGNOSIS — I11.0 HYPERTENSIVE HEART DISEASE WITH CONGESTIVE HEART FAILURE, UNSPECIFIED HEART FAILURE TYPE: ICD-10-CM

## 2023-08-07 DIAGNOSIS — E27.8 ADRENAL MASS 1 CM TO 4 CM IN DIAMETER: ICD-10-CM

## 2023-08-07 DIAGNOSIS — N18.4 CHRONIC KIDNEY DISEASE, STAGE IV (SEVERE): Primary | ICD-10-CM

## 2023-08-07 DIAGNOSIS — N18.4 TYPE 2 DIABETES MELLITUS WITH STAGE 4 CHRONIC KIDNEY DISEASE, WITH LONG-TERM CURRENT USE OF INSULIN: Primary | ICD-10-CM

## 2023-08-07 DIAGNOSIS — N18.4 TYPE 2 DIABETES MELLITUS WITH STAGE 4 CHRONIC KIDNEY DISEASE, WITH LONG-TERM CURRENT USE OF INSULIN: ICD-10-CM

## 2023-08-07 DIAGNOSIS — E11.22 TYPE 2 DIABETES MELLITUS WITH STAGE 4 CHRONIC KIDNEY DISEASE, WITH LONG-TERM CURRENT USE OF INSULIN: Primary | ICD-10-CM

## 2023-08-07 DIAGNOSIS — Z79.4 TYPE 2 DIABETES MELLITUS WITH STAGE 4 CHRONIC KIDNEY DISEASE, WITH LONG-TERM CURRENT USE OF INSULIN: ICD-10-CM

## 2023-08-07 LAB
ALBUMIN SERPL BCP-MCNC: 4 G/DL (ref 3.5–5.2)
ALP SERPL-CCNC: 63 U/L (ref 55–135)
ALT SERPL W/O P-5'-P-CCNC: 17 U/L (ref 10–44)
ANION GAP SERPL CALC-SCNC: 16 MMOL/L (ref 8–16)
ANION GAP SERPL CALC-SCNC: 17 MMOL/L (ref 8–16)
ANISOCYTOSIS BLD QL SMEAR: ABNORMAL
AST SERPL-CCNC: 24 U/L (ref 10–40)
BASOPHILS # BLD AUTO: 0.01 K/UL (ref 0–0.2)
BASOPHILS NFR BLD: 0.2 % (ref 0–1.9)
BILIRUB SERPL-MCNC: 1.6 MG/DL (ref 0.1–1)
BNP SERPL-MCNC: 494 PG/ML (ref 0–99)
BUN SERPL-MCNC: 36 MG/DL (ref 6–20)
BUN SERPL-MCNC: 36 MG/DL (ref 6–20)
CALCIUM SERPL-MCNC: 9.8 MG/DL (ref 8.7–10.5)
CALCIUM SERPL-MCNC: 9.8 MG/DL (ref 8.7–10.5)
CHLORIDE SERPL-SCNC: 107 MMOL/L (ref 95–110)
CHLORIDE SERPL-SCNC: 108 MMOL/L (ref 95–110)
CO2 SERPL-SCNC: 21 MMOL/L (ref 23–29)
CO2 SERPL-SCNC: 23 MMOL/L (ref 23–29)
CREAT SERPL-MCNC: 2.2 MG/DL (ref 0.5–1.4)
CREAT SERPL-MCNC: 2.2 MG/DL (ref 0.5–1.4)
DACRYOCYTES BLD QL SMEAR: ABNORMAL
DHEA-S SERPL-MCNC: 94.6 UG/DL (ref 29.7–182.2)
DIFFERENTIAL METHOD: ABNORMAL
EOSINOPHIL # BLD AUTO: 0.1 K/UL (ref 0–0.5)
EOSINOPHIL NFR BLD: 1.6 % (ref 0–8)
ERYTHROCYTE [DISTWIDTH] IN BLOOD BY AUTOMATED COUNT: 28.5 % (ref 11.5–14.5)
EST. GFR  (NO RACE VARIABLE): 26 ML/MIN/1.73 M^2
EST. GFR  (NO RACE VARIABLE): 26 ML/MIN/1.73 M^2
ESTIMATED AVG GLUCOSE: 91 MG/DL (ref 68–131)
GLUCOSE SERPL-MCNC: 95 MG/DL (ref 70–110)
GLUCOSE SERPL-MCNC: 96 MG/DL (ref 70–110)
HBA1C MFR BLD: 4.8 % (ref 4–5.6)
HCT VFR BLD AUTO: 30.1 % (ref 37–48.5)
HGB BLD-MCNC: 9.2 G/DL (ref 12–16)
HYPOCHROMIA BLD QL SMEAR: ABNORMAL
IMM GRANULOCYTES # BLD AUTO: 0.03 K/UL (ref 0–0.04)
IMM GRANULOCYTES NFR BLD AUTO: 0.6 % (ref 0–0.5)
LYMPHOCYTES # BLD AUTO: 0.7 K/UL (ref 1–4.8)
LYMPHOCYTES NFR BLD: 14.5 % (ref 18–48)
MAGNESIUM SERPL-MCNC: 1.6 MG/DL (ref 1.6–2.6)
MCH RBC QN AUTO: 17.8 PG (ref 27–31)
MCHC RBC AUTO-ENTMCNC: 30.6 G/DL (ref 32–36)
MCV RBC AUTO: 58 FL (ref 82–98)
MONOCYTES # BLD AUTO: 0.4 K/UL (ref 0.3–1)
MONOCYTES NFR BLD: 9 % (ref 4–15)
NEUTROPHILS # BLD AUTO: 3.6 K/UL (ref 1.8–7.7)
NEUTROPHILS NFR BLD: 74.1 % (ref 38–73)
NRBC BLD-RTO: 0 /100 WBC
OVALOCYTES BLD QL SMEAR: ABNORMAL
PHOSPHATE SERPL-MCNC: 4 MG/DL (ref 2.7–4.5)
PLATELET # BLD AUTO: 163 K/UL (ref 150–450)
PMV BLD AUTO: ABNORMAL FL (ref 9.2–12.9)
POIKILOCYTOSIS BLD QL SMEAR: ABNORMAL
POLYCHROMASIA BLD QL SMEAR: ABNORMAL
POTASSIUM SERPL-SCNC: 3.2 MMOL/L (ref 3.5–5.1)
POTASSIUM SERPL-SCNC: 3.3 MMOL/L (ref 3.5–5.1)
PROT SERPL-MCNC: 7.5 G/DL (ref 6–8.4)
RBC # BLD AUTO: 5.16 M/UL (ref 4–5.4)
SCHISTOCYTES BLD QL SMEAR: PRESENT
SODIUM SERPL-SCNC: 146 MMOL/L (ref 136–145)
SODIUM SERPL-SCNC: 146 MMOL/L (ref 136–145)
STOMATOCYTES BLD QL SMEAR: PRESENT
TARGETS BLD QL SMEAR: ABNORMAL
TSH SERPL DL<=0.005 MIU/L-ACNC: 1.31 UIU/ML (ref 0.4–4)
WBC # BLD AUTO: 4.91 K/UL (ref 3.9–12.7)

## 2023-08-07 PROCEDURE — 99999 PR PBB SHADOW E&M-EST. PATIENT-LVL V: CPT | Mod: PBBFAC,,, | Performed by: STUDENT IN AN ORGANIZED HEALTH CARE EDUCATION/TRAINING PROGRAM

## 2023-08-07 PROCEDURE — 84244 ASSAY OF RENIN: CPT | Performed by: STUDENT IN AN ORGANIZED HEALTH CARE EDUCATION/TRAINING PROGRAM

## 2023-08-07 PROCEDURE — 3078F DIAST BP <80 MM HG: CPT | Mod: CPTII,,, | Performed by: STUDENT IN AN ORGANIZED HEALTH CARE EDUCATION/TRAINING PROGRAM

## 2023-08-07 PROCEDURE — 3008F BODY MASS INDEX DOCD: CPT | Mod: CPTII,,, | Performed by: STUDENT IN AN ORGANIZED HEALTH CARE EDUCATION/TRAINING PROGRAM

## 2023-08-07 PROCEDURE — 1159F MED LIST DOCD IN RCRD: CPT | Mod: CPTII,,, | Performed by: STUDENT IN AN ORGANIZED HEALTH CARE EDUCATION/TRAINING PROGRAM

## 2023-08-07 PROCEDURE — 3008F PR BODY MASS INDEX (BMI) DOCUMENTED: ICD-10-PCS | Mod: CPTII,,, | Performed by: STUDENT IN AN ORGANIZED HEALTH CARE EDUCATION/TRAINING PROGRAM

## 2023-08-07 PROCEDURE — 36415 COLL VENOUS BLD VENIPUNCTURE: CPT | Performed by: INTERNAL MEDICINE

## 2023-08-07 PROCEDURE — 84443 ASSAY THYROID STIM HORMONE: CPT | Performed by: STUDENT IN AN ORGANIZED HEALTH CARE EDUCATION/TRAINING PROGRAM

## 2023-08-07 PROCEDURE — 82088 ASSAY OF ALDOSTERONE: CPT | Performed by: STUDENT IN AN ORGANIZED HEALTH CARE EDUCATION/TRAINING PROGRAM

## 2023-08-07 PROCEDURE — 80053 COMPREHEN METABOLIC PANEL: CPT | Performed by: INTERNAL MEDICINE

## 2023-08-07 PROCEDURE — 95251 CONT GLUC MNTR ANALYSIS I&R: CPT | Mod: ,,, | Performed by: STUDENT IN AN ORGANIZED HEALTH CARE EDUCATION/TRAINING PROGRAM

## 2023-08-07 PROCEDURE — 80048 BASIC METABOLIC PNL TOTAL CA: CPT | Mod: XB | Performed by: STUDENT IN AN ORGANIZED HEALTH CARE EDUCATION/TRAINING PROGRAM

## 2023-08-07 PROCEDURE — 99214 PR OFFICE/OUTPT VISIT, EST, LEVL IV, 30-39 MIN: ICD-10-PCS | Mod: 25,S$PBB,, | Performed by: STUDENT IN AN ORGANIZED HEALTH CARE EDUCATION/TRAINING PROGRAM

## 2023-08-07 PROCEDURE — 4010F PR ACE/ARB THEARPY RXD/TAKEN: ICD-10-PCS | Mod: CPTII,,, | Performed by: STUDENT IN AN ORGANIZED HEALTH CARE EDUCATION/TRAINING PROGRAM

## 2023-08-07 PROCEDURE — 85025 COMPLETE CBC W/AUTO DIFF WBC: CPT | Performed by: INTERNAL MEDICINE

## 2023-08-07 PROCEDURE — 3074F PR MOST RECENT SYSTOLIC BLOOD PRESSURE < 130 MM HG: ICD-10-PCS | Mod: CPTII,,, | Performed by: STUDENT IN AN ORGANIZED HEALTH CARE EDUCATION/TRAINING PROGRAM

## 2023-08-07 PROCEDURE — 3044F HG A1C LEVEL LT 7.0%: CPT | Mod: CPTII,,, | Performed by: STUDENT IN AN ORGANIZED HEALTH CARE EDUCATION/TRAINING PROGRAM

## 2023-08-07 PROCEDURE — 1159F PR MEDICATION LIST DOCUMENTED IN MEDICAL RECORD: ICD-10-PCS | Mod: CPTII,,, | Performed by: STUDENT IN AN ORGANIZED HEALTH CARE EDUCATION/TRAINING PROGRAM

## 2023-08-07 PROCEDURE — 83880 ASSAY OF NATRIURETIC PEPTIDE: CPT | Performed by: INTERNAL MEDICINE

## 2023-08-07 PROCEDURE — 3074F SYST BP LT 130 MM HG: CPT | Mod: CPTII,,, | Performed by: STUDENT IN AN ORGANIZED HEALTH CARE EDUCATION/TRAINING PROGRAM

## 2023-08-07 PROCEDURE — 3044F PR MOST RECENT HEMOGLOBIN A1C LEVEL <7.0%: ICD-10-PCS | Mod: CPTII,,, | Performed by: STUDENT IN AN ORGANIZED HEALTH CARE EDUCATION/TRAINING PROGRAM

## 2023-08-07 PROCEDURE — 1160F RVW MEDS BY RX/DR IN RCRD: CPT | Mod: CPTII,,, | Performed by: STUDENT IN AN ORGANIZED HEALTH CARE EDUCATION/TRAINING PROGRAM

## 2023-08-07 PROCEDURE — 83036 HEMOGLOBIN GLYCOSYLATED A1C: CPT | Performed by: STUDENT IN AN ORGANIZED HEALTH CARE EDUCATION/TRAINING PROGRAM

## 2023-08-07 PROCEDURE — 82024 ASSAY OF ACTH: CPT | Performed by: STUDENT IN AN ORGANIZED HEALTH CARE EDUCATION/TRAINING PROGRAM

## 2023-08-07 PROCEDURE — 83735 ASSAY OF MAGNESIUM: CPT | Performed by: INTERNAL MEDICINE

## 2023-08-07 PROCEDURE — 3078F PR MOST RECENT DIASTOLIC BLOOD PRESSURE < 80 MM HG: ICD-10-PCS | Mod: CPTII,,, | Performed by: STUDENT IN AN ORGANIZED HEALTH CARE EDUCATION/TRAINING PROGRAM

## 2023-08-07 PROCEDURE — 83835 ASSAY OF METANEPHRINES: CPT | Performed by: STUDENT IN AN ORGANIZED HEALTH CARE EDUCATION/TRAINING PROGRAM

## 2023-08-07 PROCEDURE — 82533 TOTAL CORTISOL: CPT | Performed by: STUDENT IN AN ORGANIZED HEALTH CARE EDUCATION/TRAINING PROGRAM

## 2023-08-07 PROCEDURE — 82627 DEHYDROEPIANDROSTERONE: CPT | Performed by: STUDENT IN AN ORGANIZED HEALTH CARE EDUCATION/TRAINING PROGRAM

## 2023-08-07 PROCEDURE — 99215 OFFICE O/P EST HI 40 MIN: CPT | Mod: PBBFAC | Performed by: STUDENT IN AN ORGANIZED HEALTH CARE EDUCATION/TRAINING PROGRAM

## 2023-08-07 PROCEDURE — 99214 OFFICE O/P EST MOD 30 MIN: CPT | Mod: 25,S$PBB,, | Performed by: STUDENT IN AN ORGANIZED HEALTH CARE EDUCATION/TRAINING PROGRAM

## 2023-08-07 PROCEDURE — 4010F ACE/ARB THERAPY RXD/TAKEN: CPT | Mod: CPTII,,, | Performed by: STUDENT IN AN ORGANIZED HEALTH CARE EDUCATION/TRAINING PROGRAM

## 2023-08-07 PROCEDURE — 95251 PR GLUCOSE MONITOR, 72 HOUR, PHYS INTERP: ICD-10-PCS | Mod: ,,, | Performed by: STUDENT IN AN ORGANIZED HEALTH CARE EDUCATION/TRAINING PROGRAM

## 2023-08-07 PROCEDURE — 84100 ASSAY OF PHOSPHORUS: CPT | Performed by: INTERNAL MEDICINE

## 2023-08-07 PROCEDURE — 99999 PR PBB SHADOW E&M-EST. PATIENT-LVL V: ICD-10-PCS | Mod: PBBFAC,,, | Performed by: STUDENT IN AN ORGANIZED HEALTH CARE EDUCATION/TRAINING PROGRAM

## 2023-08-07 PROCEDURE — 1160F PR REVIEW ALL MEDS BY PRESCRIBER/CLIN PHARMACIST DOCUMENTED: ICD-10-PCS | Mod: CPTII,,, | Performed by: STUDENT IN AN ORGANIZED HEALTH CARE EDUCATION/TRAINING PROGRAM

## 2023-08-07 RX ORDER — ONDANSETRON 8 MG/1
8 TABLET, ORALLY DISINTEGRATING ORAL EVERY 8 HOURS PRN
COMMUNITY
Start: 2023-08-03 | End: 2023-10-26

## 2023-08-07 RX ORDER — SCOLOPAMINE TRANSDERMAL SYSTEM 1 MG/1
PATCH, EXTENDED RELEASE TRANSDERMAL
COMMUNITY
Start: 2023-08-03 | End: 2023-10-27

## 2023-08-07 NOTE — PROGRESS NOTES
PICC dressing change complete-paulina well.  Pt sent to lab to get labwork for multiple doctors drawn

## 2023-08-07 NOTE — ASSESSMENT & PLAN NOTE
Control is excellent with TIR > 90% without hypoglycemia on CGM. A1c 5.9% although has anemia.     She is on an SGLT-2 inhibitor which is now prescribed by Cardiology based on CHF. Her GFR is 23 and is also followed by Nephrology. I will defer the use of SGLT-2 inhibitor to them.    Plan  - Continue Ozempic 0.5 mg weekly  - Continue Jardiance 25 mg daily  - Continue Dexcom G7 CGM    Labs now    F/u 6 months

## 2023-08-07 NOTE — PROGRESS NOTES
"Subjective:      Patient ID: Hafsa Hawley is a 57 y.o. female.    Chief Complaint:  Type 2 diabetes mellitus    History of Present Illness  This is a 57 y.o. female. with a past medical history of Type 2 diabetes mellitus, HFrEF here for evaluation.    Type 2 diabetes mellitus    Current diabetes medications:  - Jardiance 25 mg daily - Cardiology adjusted in June 2023  - Ozempic 0.5 mg weekly      Past diabetes medications:  - Metformin - CKD  - Levemir  - Glimepiride     Lab Results   Component Value Date    CREATININE 2.4 (H) 07/31/2023    EGFRNORACEVR 23 (A) 07/31/2023       Known diabetic complications: nephropathy and cardiovascular disease    Weight trend:  Wt Readings from Last 8 Encounters:   08/07/23 82 kg (180 lb 12.4 oz)   07/11/23 83.9 kg (184 lb 15.5 oz)   06/30/23 82.3 kg (181 lb 7 oz)   06/27/23 82.5 kg (181 lb 14.1 oz)   06/21/23 85 kg (187 lb 6.3 oz)   06/19/23 84.4 kg (186 lb)   06/06/23 82.6 kg (182 lb 1.6 oz)   05/29/23 83.8 kg (184 lb 11.9 oz)       Family history of diabetes:  Yes    Prior visit with diabetes education: yes    Current diet: 3 meals per day    Blood glucose monitoring at home:         Diabetes Management Status  Statin: Taking  ACE/ARB: Not taking    Screening or Prevention Patient's value   HgA1C Testing and Control   Lab Results   Component Value Date    HGBA1C 5.9 (H) 05/11/2023        LDL control Lab Results   Component Value Date    LDLCALC 57.8 (L) 01/04/2023      Nephropathy screening No results found for: "MICALBCREAT"     Last eye exam: : 04/27/2022          R adrenal mass  She has a 4 cm adrenal nodule  This has been followed by Neuroendocrine team in [place]  Adrenal has been stable at 4 cm  Surgical removal was considered but then decided not to based on poor cardiac function  I will review records in detail and see if she needs hormonal work up  She has a recent CT from Feb 2023 indicating stability in size    CT A/P wo contrast (Jun 2023)  There is an " "approximately 4 cm right adrenal lesion again noted appearing stable when compared to prior examinations dating back to June 2018.      CT A/P wo contrast (Feb 2023)  Stomach, spleen, pancreas and left adrenal glands show no significant abnormalities on noncontrast imaging.  There is a 4 cm right adrenal nodule stable compared to multiple prior exams.    Review of Systems  As above    Social and family history reviewed  Current medications and allergies reviewed    Objective:   /74 (BP Location: Left arm, Patient Position: Sitting, BP Method: Medium (Manual))   Pulse 72   Resp 18   Ht 5' 3" (1.6 m)   Wt 82 kg (180 lb 12.4 oz)   LMP 10/23/2001   BMI 32.02 kg/m²   Physical Exam  Alert, oriented    BP Readings from Last 1 Encounters:   08/07/23 128/74      Wt Readings from Last 1 Encounters:   08/07/23 1015 82 kg (180 lb 12.4 oz)     Body mass index is 32.02 kg/m².    Lab Review:   Lab Results   Component Value Date    HGBA1C 5.9 (H) 05/11/2023     Lab Results   Component Value Date    CHOL 136 01/04/2023    HDL 63 01/04/2023    LDLCALC 57.8 (L) 01/04/2023    TRIG 76 01/04/2023    CHOLHDL 46.3 01/04/2023     Lab Results   Component Value Date     07/31/2023    K 3.5 07/31/2023     07/31/2023    CO2 23 07/31/2023    GLU 76 07/31/2023    BUN 43 (H) 07/31/2023    CREATININE 2.4 (H) 07/31/2023    CALCIUM 9.6 07/31/2023    PROT 7.0 07/31/2023    ALBUMIN 3.9 07/31/2023    BILITOT 1.3 (H) 07/31/2023    ALKPHOS 60 07/31/2023    AST 19 07/31/2023    ALT 15 07/31/2023    ANIONGAP 13 07/31/2023    ESTGFRAFRICA 29.7 (A) 04/26/2022    EGFRNONAA 25.7 (A) 04/26/2022    TSH 1.407 01/04/2023       All pertinent labs reviewed    Assessment and Plan     Type 2 diabetes mellitus with stage 4 chronic kidney disease, with long-term current use of insulin  Control is excellent with TIR > 90% without hypoglycemia on CGM. A1c 5.9% although has anemia.     She is on an SGLT-2 inhibitor which is now prescribed by " Cardiology based on CHF. Her GFR is 23 and is also followed by Nephrology. I will defer the use of SGLT-2 inhibitor to them.    Plan  - Continue Ozempic 0.5 mg weekly  - Continue Jardiance 25 mg daily  - Continue Dexcom G7 CGM    Labs now    F/u 6 months            BMI 32.0-32.9,adult  Continue GLP-1 RA given weight loss benefit    Adrenal mass 1 cm to 4 cm in diameter  4 cm adrenal nodule which has been stable since 2018, very low risk of malignancy  This had been followed by Neuroendocrine team in [place]  Surgical removal was considered but then decided not to based on poor cardiac function  I do not see any biochemical work up so will start with basics    Plan  - Check BMP, PAC/PRA, DHEA-s, baseline 8 AM cortisol, ACTH, plasma metanephrines    Consider DST later on        Follow-up in 6 months    Uziel Herman MD  Endocrinology

## 2023-08-07 NOTE — ASSESSMENT & PLAN NOTE
4 cm adrenal nodule which has been stable since 2018, very low risk of malignancy  This had been followed by Neuroendocrine team in [place]  Surgical removal was considered but then decided not to based on poor cardiac function  I do not see any biochemical work up so will start with basics    Plan  - Check BMP, PAC/PRA, DHEA-s, baseline 8 AM cortisol, ACTH, plasma metanephrines    Consider DST later on

## 2023-08-08 LAB
ACTH PLAS-MCNC: 9 PG/ML (ref 0–46)
CORTIS SERPL-MCNC: 12.7 UG/DL (ref 4.3–22.4)

## 2023-08-10 LAB — ALDOST SERPL-MCNC: 15.4 NG/DL

## 2023-08-11 ENCOUNTER — OFFICE VISIT (OUTPATIENT)
Dept: FAMILY MEDICINE | Facility: CLINIC | Age: 58
End: 2023-08-11
Payer: MEDICAID

## 2023-08-11 VITALS
BODY MASS INDEX: 32.66 KG/M2 | RESPIRATION RATE: 18 BRPM | HEIGHT: 63 IN | OXYGEN SATURATION: 96 % | WEIGHT: 184.31 LBS | HEART RATE: 77 BPM | DIASTOLIC BLOOD PRESSURE: 68 MMHG | SYSTOLIC BLOOD PRESSURE: 128 MMHG

## 2023-08-11 DIAGNOSIS — N18.4 CKD (CHRONIC KIDNEY DISEASE) STAGE 4, GFR 15-29 ML/MIN: ICD-10-CM

## 2023-08-11 DIAGNOSIS — I42.8 NICM (NONISCHEMIC CARDIOMYOPATHY): Primary | Chronic | ICD-10-CM

## 2023-08-11 DIAGNOSIS — R11.2 NAUSEA AND VOMITING, UNSPECIFIED VOMITING TYPE: ICD-10-CM

## 2023-08-11 DIAGNOSIS — I50.20 ACC/AHA STAGE D SYSTOLIC HEART FAILURE: ICD-10-CM

## 2023-08-11 DIAGNOSIS — Z95.810 ICD (IMPLANTABLE CARDIOVERTER-DEFIBRILLATOR) IN PLACE: Chronic | ICD-10-CM

## 2023-08-11 PROCEDURE — 3044F PR MOST RECENT HEMOGLOBIN A1C LEVEL <7.0%: ICD-10-PCS | Mod: CPTII,,, | Performed by: FAMILY MEDICINE

## 2023-08-11 PROCEDURE — 99999 PR PBB SHADOW E&M-EST. PATIENT-LVL IV: ICD-10-PCS | Mod: PBBFAC,,, | Performed by: FAMILY MEDICINE

## 2023-08-11 PROCEDURE — 99999 PR PBB SHADOW E&M-EST. PATIENT-LVL IV: CPT | Mod: PBBFAC,,, | Performed by: FAMILY MEDICINE

## 2023-08-11 PROCEDURE — 3044F HG A1C LEVEL LT 7.0%: CPT | Mod: CPTII,,, | Performed by: FAMILY MEDICINE

## 2023-08-11 PROCEDURE — 3008F PR BODY MASS INDEX (BMI) DOCUMENTED: ICD-10-PCS | Mod: CPTII,,, | Performed by: FAMILY MEDICINE

## 2023-08-11 PROCEDURE — 3008F BODY MASS INDEX DOCD: CPT | Mod: CPTII,,, | Performed by: FAMILY MEDICINE

## 2023-08-11 PROCEDURE — 4010F ACE/ARB THERAPY RXD/TAKEN: CPT | Mod: CPTII,,, | Performed by: FAMILY MEDICINE

## 2023-08-11 PROCEDURE — 99214 OFFICE O/P EST MOD 30 MIN: CPT | Mod: PBBFAC | Performed by: FAMILY MEDICINE

## 2023-08-11 PROCEDURE — 1159F MED LIST DOCD IN RCRD: CPT | Mod: CPTII,,, | Performed by: FAMILY MEDICINE

## 2023-08-11 PROCEDURE — 3074F PR MOST RECENT SYSTOLIC BLOOD PRESSURE < 130 MM HG: ICD-10-PCS | Mod: CPTII,,, | Performed by: FAMILY MEDICINE

## 2023-08-11 PROCEDURE — 4010F PR ACE/ARB THEARPY RXD/TAKEN: ICD-10-PCS | Mod: CPTII,,, | Performed by: FAMILY MEDICINE

## 2023-08-11 PROCEDURE — 1159F PR MEDICATION LIST DOCUMENTED IN MEDICAL RECORD: ICD-10-PCS | Mod: CPTII,,, | Performed by: FAMILY MEDICINE

## 2023-08-11 PROCEDURE — 99213 OFFICE O/P EST LOW 20 MIN: CPT | Mod: S$PBB,,, | Performed by: FAMILY MEDICINE

## 2023-08-11 PROCEDURE — 3078F DIAST BP <80 MM HG: CPT | Mod: CPTII,,, | Performed by: FAMILY MEDICINE

## 2023-08-11 PROCEDURE — 99213 PR OFFICE/OUTPT VISIT, EST, LEVL III, 20-29 MIN: ICD-10-PCS | Mod: S$PBB,,, | Performed by: FAMILY MEDICINE

## 2023-08-11 PROCEDURE — 3078F PR MOST RECENT DIASTOLIC BLOOD PRESSURE < 80 MM HG: ICD-10-PCS | Mod: CPTII,,, | Performed by: FAMILY MEDICINE

## 2023-08-11 PROCEDURE — 3074F SYST BP LT 130 MM HG: CPT | Mod: CPTII,,, | Performed by: FAMILY MEDICINE

## 2023-08-11 RX ORDER — PROMETHAZINE HYDROCHLORIDE AND CODEINE PHOSPHATE 6.25; 1 MG/5ML; MG/5ML
5 SOLUTION ORAL EVERY 8 HOURS PRN
Qty: 240 ML | Refills: 5 | Status: SHIPPED | OUTPATIENT
Start: 2023-08-11 | End: 2023-10-27

## 2023-08-11 RX ORDER — PROMETHAZINE HYDROCHLORIDE 25 MG/1
25 SUPPOSITORY RECTAL EVERY 6 HOURS PRN
Qty: 10 SUPPOSITORY | Refills: 5 | Status: SHIPPED | OUTPATIENT
Start: 2023-08-11 | End: 2023-09-07 | Stop reason: SDUPTHER

## 2023-08-11 NOTE — PROGRESS NOTES
Subjective:       Patient ID: Hafsa Hawley is a 57 y.o. female.    Chief Complaint: Nausea, Emesis, Abdominal Pain, and Cough    Pt is a 57 y.o. female who presents for check up for Nicm (nonischemic cardiomyopathy)  (primary encounter diagnosis)  Icd (implantable cardioverter-defibrillator) in place, WITH INTRACTABLE   Nausea and vomiting, unspecified vomiting type. Doing well on current meds. Denies any side effects. Prevention is up to date.     Nausea  Associated symptoms include abdominal pain, arthralgias, coughing, joint swelling, nausea and vomiting.   Emesis   Associated symptoms include abdominal pain, arthralgias and coughing.   Abdominal Pain  Associated symptoms include arthralgias, nausea and vomiting.   Cough      Review of Systems   Respiratory:  Positive for cough.    Cardiovascular:         ISCHEMIC CARDIOMYOPATHY   Gastrointestinal:  Positive for abdominal pain, nausea and vomiting.        N&V daily   Musculoskeletal:  Positive for arthralgias, gait problem and joint swelling.       Objective:      Physical Exam  Constitutional:       Appearance: She is well-developed. She is ill-appearing.   HENT:      Head: Normocephalic.   Eyes:      Pupils: Pupils are equal, round, and reactive to light.   Neck:      Thyroid: No thyromegaly.   Cardiovascular:      Rate and Rhythm: Normal rate and regular rhythm.   Pulmonary:      Effort: No respiratory distress.      Breath sounds: No wheezing or rales.   Chest:      Chest wall: No tenderness.   Abdominal:      General: There is no distension.      Tenderness: There is no abdominal tenderness. There is no guarding or rebound.   Musculoskeletal:         General: No tenderness. Normal range of motion.      Cervical back: Normal range of motion and neck supple.   Lymphadenopathy:      Cervical: No cervical adenopathy.   Skin:     General: Skin is warm and dry.      Coloration: Skin is not pale.      Findings: No rash.   Neurological:      Mental Status: She  is alert and oriented to person, place, and time.      Cranial Nerves: No cranial nerve deficit.      Motor: No abnormal muscle tone.      Coordination: Coordination normal.      Deep Tendon Reflexes: Reflexes are normal and symmetric. Reflexes normal.   Psychiatric:         Thought Content: Thought content normal.         Judgment: Judgment normal.         Assessment:       Encounter Diagnoses   Name Primary?    NICM (nonischemic cardiomyopathy) Yes    ICD (implantable cardioverter-defibrillator) in place     Nausea and vomiting, unspecified vomiting type     ACC/AHA stage D systolic heart failure     CKD (chronic kidney disease) stage 4, GFR 15-29 ml/min          Plan:   1. NICM (nonischemic cardiomyopathy)    2. ICD (implantable cardioverter-defibrillator) in place    3. Nausea and vomiting, unspecified vomiting type  -     promethazine (PHENERGAN) 25 MG suppository; Place 1 suppository (25 mg total) rectally every 6 (six) hours as needed for Nausea.  Dispense: 10 suppository; Refill: 5    4. ACC/AHA stage D systolic heart failure    5. CKD (chronic kidney disease) stage 4, GFR 15-29 ml/min    Other orders  -     promethazine-codeine 6.25-10 mg/5 ml (PHENERGAN WITH CODEINE) 6.25-10 mg/5 mL syrup; Take 5 mLs by mouth every 8 (eight) hours as needed for Cough.  Dispense: 240 mL; Refill: 5

## 2023-08-12 LAB — RENIN PLAS-CCNC: 9.1 NG/ML/H

## 2023-08-14 LAB
METANEPH FREE SERPL-MCNC: 37 PG/ML
METANEPHS SERPL-MCNC: 107 PG/ML
NORMETANEPH FREE SERPL-MCNC: 70 PG/ML

## 2023-08-15 ENCOUNTER — PATIENT MESSAGE (OUTPATIENT)
Dept: ENDOCRINOLOGY | Facility: CLINIC | Age: 58
End: 2023-08-15
Payer: MEDICAID

## 2023-08-16 ENCOUNTER — TELEPHONE (OUTPATIENT)
Dept: ENDOCRINOLOGY | Facility: CLINIC | Age: 58
End: 2023-08-16
Payer: MEDICAID

## 2023-08-16 ENCOUNTER — INFUSION (OUTPATIENT)
Dept: INFUSION THERAPY | Facility: HOSPITAL | Age: 58
End: 2023-08-16
Attending: STUDENT IN AN ORGANIZED HEALTH CARE EDUCATION/TRAINING PROGRAM
Payer: MEDICAID

## 2023-08-16 VITALS
TEMPERATURE: 98 F | SYSTOLIC BLOOD PRESSURE: 140 MMHG | HEART RATE: 78 BPM | RESPIRATION RATE: 20 BRPM | DIASTOLIC BLOOD PRESSURE: 76 MMHG

## 2023-08-16 DIAGNOSIS — E11.9 DIABETES MELLITUS WITHOUT COMPLICATION: ICD-10-CM

## 2023-08-16 DIAGNOSIS — I11.0 HYPERTENSIVE HEART DISEASE WITH CONGESTIVE HEART FAILURE, UNSPECIFIED HEART FAILURE TYPE: ICD-10-CM

## 2023-08-16 DIAGNOSIS — N18.4 CHRONIC KIDNEY DISEASE, STAGE IV (SEVERE): Primary | ICD-10-CM

## 2023-08-16 DIAGNOSIS — I50.42 CHRONIC COMBINED SYSTOLIC AND DIASTOLIC HEART FAILURE: ICD-10-CM

## 2023-08-16 LAB
ALBUMIN SERPL BCP-MCNC: 3.8 G/DL (ref 3.5–5.2)
ALP SERPL-CCNC: 61 U/L (ref 55–135)
ALT SERPL W/O P-5'-P-CCNC: 13 U/L (ref 10–44)
ANION GAP SERPL CALC-SCNC: 16 MMOL/L (ref 8–16)
ANISOCYTOSIS BLD QL SMEAR: ABNORMAL
AST SERPL-CCNC: 23 U/L (ref 10–40)
BASOPHILS # BLD AUTO: 0.01 K/UL (ref 0–0.2)
BASOPHILS NFR BLD: 0.2 % (ref 0–1.9)
BILIRUB SERPL-MCNC: 1.4 MG/DL (ref 0.1–1)
BNP SERPL-MCNC: 881 PG/ML (ref 0–99)
BUN SERPL-MCNC: 43 MG/DL (ref 6–20)
CALCIUM SERPL-MCNC: 9.3 MG/DL (ref 8.7–10.5)
CHLORIDE SERPL-SCNC: 104 MMOL/L (ref 95–110)
CO2 SERPL-SCNC: 24 MMOL/L (ref 23–29)
CREAT SERPL-MCNC: 2.6 MG/DL (ref 0.5–1.4)
DACRYOCYTES BLD QL SMEAR: ABNORMAL
DIFFERENTIAL METHOD: ABNORMAL
EOSINOPHIL # BLD AUTO: 0.1 K/UL (ref 0–0.5)
EOSINOPHIL NFR BLD: 2.5 % (ref 0–8)
ERYTHROCYTE [DISTWIDTH] IN BLOOD BY AUTOMATED COUNT: 28.5 % (ref 11.5–14.5)
EST. GFR  (NO RACE VARIABLE): 21 ML/MIN/1.73 M^2
GLUCOSE SERPL-MCNC: 131 MG/DL (ref 70–110)
HCT VFR BLD AUTO: 28.2 % (ref 37–48.5)
HGB BLD-MCNC: 8.5 G/DL (ref 12–16)
HYPOCHROMIA BLD QL SMEAR: ABNORMAL
IMM GRANULOCYTES # BLD AUTO: 0.02 K/UL (ref 0–0.04)
IMM GRANULOCYTES NFR BLD AUTO: 0.4 % (ref 0–0.5)
LYMPHOCYTES # BLD AUTO: 0.8 K/UL (ref 1–4.8)
LYMPHOCYTES NFR BLD: 15.8 % (ref 18–48)
MAGNESIUM SERPL-MCNC: 1.5 MG/DL (ref 1.6–2.6)
MCH RBC QN AUTO: 17.7 PG (ref 27–31)
MCHC RBC AUTO-ENTMCNC: 30.1 G/DL (ref 32–36)
MCV RBC AUTO: 59 FL (ref 82–98)
MONOCYTES # BLD AUTO: 0.4 K/UL (ref 0.3–1)
MONOCYTES NFR BLD: 7.4 % (ref 4–15)
NEUTROPHILS # BLD AUTO: 3.5 K/UL (ref 1.8–7.7)
NEUTROPHILS NFR BLD: 73.7 % (ref 38–73)
NRBC BLD-RTO: 1 /100 WBC
OVALOCYTES BLD QL SMEAR: ABNORMAL
PHOSPHATE SERPL-MCNC: 4 MG/DL (ref 2.7–4.5)
PLATELET # BLD AUTO: 175 K/UL (ref 150–450)
PLATELET BLD QL SMEAR: ABNORMAL
PMV BLD AUTO: ABNORMAL FL (ref 9.2–12.9)
POIKILOCYTOSIS BLD QL SMEAR: ABNORMAL
POLYCHROMASIA BLD QL SMEAR: ABNORMAL
POTASSIUM SERPL-SCNC: 3.4 MMOL/L (ref 3.5–5.1)
PROT SERPL-MCNC: 7 G/DL (ref 6–8.4)
RBC # BLD AUTO: 4.79 M/UL (ref 4–5.4)
SCHISTOCYTES BLD QL SMEAR: PRESENT
SODIUM SERPL-SCNC: 144 MMOL/L (ref 136–145)
STOMATOCYTES BLD QL SMEAR: PRESENT
TARGETS BLD QL SMEAR: ABNORMAL
WBC # BLD AUTO: 4.75 K/UL (ref 3.9–12.7)

## 2023-08-16 PROCEDURE — 83880 ASSAY OF NATRIURETIC PEPTIDE: CPT | Performed by: INTERNAL MEDICINE

## 2023-08-16 PROCEDURE — 84100 ASSAY OF PHOSPHORUS: CPT | Performed by: INTERNAL MEDICINE

## 2023-08-16 PROCEDURE — 83735 ASSAY OF MAGNESIUM: CPT | Performed by: INTERNAL MEDICINE

## 2023-08-16 PROCEDURE — 85025 COMPLETE CBC W/AUTO DIFF WBC: CPT | Performed by: INTERNAL MEDICINE

## 2023-08-16 PROCEDURE — 36415 COLL VENOUS BLD VENIPUNCTURE: CPT | Performed by: INTERNAL MEDICINE

## 2023-08-16 PROCEDURE — 80053 COMPREHEN METABOLIC PANEL: CPT | Performed by: INTERNAL MEDICINE

## 2023-08-16 PROCEDURE — 36591 DRAW BLOOD OFF VENOUS DEVICE: CPT

## 2023-08-16 NOTE — TELEPHONE ENCOUNTER
----- Message from Ina Woody sent at 7/24/2018  2:31 PM CDT -----  Contact: pt  Pt would like a call in ref to a conversation she had with Dr. Orozco on her last visit in ref to attending meetings at [facility] in ref to her transplant.     Thanks  
Attempted to contact pt per pt request, has question about clinic visit.   
- - -

## 2023-08-16 NOTE — PROGRESS NOTES
1340  Right arm PICC line dressing change done using sterile technique.  No signs of infection noted   Stat lock device, bio patch and tegaderm dressing reapplied.  Tolerated well.

## 2023-08-16 NOTE — TELEPHONE ENCOUNTER
Patient notified of results, verbalized understanding.        ----- Message from Uziel Herman MD sent at 8/15/2023  7:16 AM CDT -----  Please inform that I reviewed results. A1c is 4.8 which is great. Continue Jardiance and Ozempic.    Adrenal labs look good too.    Kidney function stable.

## 2023-08-21 ENCOUNTER — INFUSION (OUTPATIENT)
Dept: INFUSION THERAPY | Facility: HOSPITAL | Age: 58
End: 2023-08-21
Attending: INTERNAL MEDICINE
Payer: MEDICAID

## 2023-08-21 VITALS
DIASTOLIC BLOOD PRESSURE: 76 MMHG | HEART RATE: 78 BPM | SYSTOLIC BLOOD PRESSURE: 121 MMHG | TEMPERATURE: 97 F | RESPIRATION RATE: 20 BRPM

## 2023-08-21 DIAGNOSIS — I42.9 CARDIOMYOPATHY, UNSPECIFIED TYPE: Primary | ICD-10-CM

## 2023-08-21 LAB
ALBUMIN SERPL BCP-MCNC: 3.9 G/DL (ref 3.5–5.2)
ALP SERPL-CCNC: 62 U/L (ref 55–135)
ALT SERPL W/O P-5'-P-CCNC: 14 U/L (ref 10–44)
ANION GAP SERPL CALC-SCNC: 11 MMOL/L (ref 8–16)
ANISOCYTOSIS BLD QL SMEAR: ABNORMAL
AST SERPL-CCNC: 20 U/L (ref 10–40)
BASOPHILS # BLD AUTO: 0.02 K/UL (ref 0–0.2)
BASOPHILS NFR BLD: 0.6 % (ref 0–1.9)
BILIRUB SERPL-MCNC: 1.2 MG/DL (ref 0.1–1)
BNP SERPL-MCNC: 417 PG/ML (ref 0–99)
BUN SERPL-MCNC: 41 MG/DL (ref 6–20)
CALCIUM SERPL-MCNC: 9.4 MG/DL (ref 8.7–10.5)
CHLORIDE SERPL-SCNC: 110 MMOL/L (ref 95–110)
CO2 SERPL-SCNC: 19 MMOL/L (ref 23–29)
CREAT SERPL-MCNC: 2.6 MG/DL (ref 0.5–1.4)
DACRYOCYTES BLD QL SMEAR: ABNORMAL
DIFFERENTIAL METHOD: ABNORMAL
EOSINOPHIL # BLD AUTO: 0.1 K/UL (ref 0–0.5)
EOSINOPHIL NFR BLD: 1.7 % (ref 0–8)
ERYTHROCYTE [DISTWIDTH] IN BLOOD BY AUTOMATED COUNT: 30.4 % (ref 11.5–14.5)
EST. GFR  (NO RACE VARIABLE): 21 ML/MIN/1.73 M^2
GLUCOSE SERPL-MCNC: 85 MG/DL (ref 70–110)
HCT VFR BLD AUTO: 29.9 % (ref 37–48.5)
HGB BLD-MCNC: 9.1 G/DL (ref 12–16)
HYPOCHROMIA BLD QL SMEAR: ABNORMAL
IMM GRANULOCYTES # BLD AUTO: 0.02 K/UL (ref 0–0.04)
IMM GRANULOCYTES NFR BLD AUTO: 0.6 % (ref 0–0.5)
LYMPHOCYTES # BLD AUTO: 0.7 K/UL (ref 1–4.8)
LYMPHOCYTES NFR BLD: 18.4 % (ref 18–48)
MAGNESIUM SERPL-MCNC: 2.1 MG/DL (ref 1.6–2.6)
MCH RBC QN AUTO: 18.2 PG (ref 27–31)
MCHC RBC AUTO-ENTMCNC: 30.4 G/DL (ref 32–36)
MCV RBC AUTO: 60 FL (ref 82–98)
MONOCYTES # BLD AUTO: 0.4 K/UL (ref 0.3–1)
MONOCYTES NFR BLD: 9.8 % (ref 4–15)
NEUTROPHILS # BLD AUTO: 2.5 K/UL (ref 1.8–7.7)
NEUTROPHILS NFR BLD: 68.9 % (ref 38–73)
NRBC BLD-RTO: 1 /100 WBC
OVALOCYTES BLD QL SMEAR: ABNORMAL
PHOSPHATE SERPL-MCNC: 3.6 MG/DL (ref 2.7–4.5)
PLATELET # BLD AUTO: 291 K/UL (ref 150–450)
PLATELET BLD QL SMEAR: ABNORMAL
PMV BLD AUTO: ABNORMAL FL (ref 9.2–12.9)
POIKILOCYTOSIS BLD QL SMEAR: ABNORMAL
POLYCHROMASIA BLD QL SMEAR: ABNORMAL
POTASSIUM SERPL-SCNC: 4.4 MMOL/L (ref 3.5–5.1)
PROT SERPL-MCNC: 7.2 G/DL (ref 6–8.4)
RBC # BLD AUTO: 5.01 M/UL (ref 4–5.4)
SCHISTOCYTES BLD QL SMEAR: PRESENT
SODIUM SERPL-SCNC: 140 MMOL/L (ref 136–145)
STOMATOCYTES BLD QL SMEAR: PRESENT
TARGETS BLD QL SMEAR: ABNORMAL
WBC # BLD AUTO: 3.58 K/UL (ref 3.9–12.7)

## 2023-08-21 PROCEDURE — 85025 COMPLETE CBC W/AUTO DIFF WBC: CPT | Performed by: STUDENT IN AN ORGANIZED HEALTH CARE EDUCATION/TRAINING PROGRAM

## 2023-08-21 PROCEDURE — 36591 DRAW BLOOD OFF VENOUS DEVICE: CPT

## 2023-08-21 PROCEDURE — 36415 COLL VENOUS BLD VENIPUNCTURE: CPT | Performed by: STUDENT IN AN ORGANIZED HEALTH CARE EDUCATION/TRAINING PROGRAM

## 2023-08-21 PROCEDURE — 84100 ASSAY OF PHOSPHORUS: CPT | Performed by: STUDENT IN AN ORGANIZED HEALTH CARE EDUCATION/TRAINING PROGRAM

## 2023-08-21 PROCEDURE — 80053 COMPREHEN METABOLIC PANEL: CPT | Performed by: STUDENT IN AN ORGANIZED HEALTH CARE EDUCATION/TRAINING PROGRAM

## 2023-08-21 PROCEDURE — 83735 ASSAY OF MAGNESIUM: CPT | Performed by: STUDENT IN AN ORGANIZED HEALTH CARE EDUCATION/TRAINING PROGRAM

## 2023-08-21 PROCEDURE — 83880 ASSAY OF NATRIURETIC PEPTIDE: CPT | Performed by: STUDENT IN AN ORGANIZED HEALTH CARE EDUCATION/TRAINING PROGRAM

## 2023-08-21 NOTE — PROGRESS NOTES
Right upper arm dsg change done using sterile technique.  No signs of infection noted.  Bio patch, stat lock device and tegaderm.  Blood drawn from PICC line.  Flushed with saline.

## 2023-08-23 ENCOUNTER — TELEPHONE (OUTPATIENT)
Dept: FAMILY MEDICINE | Facility: CLINIC | Age: 58
End: 2023-08-23
Payer: MEDICAID

## 2023-08-23 NOTE — TELEPHONE ENCOUNTER
Spoke to patient, informed her of results. Patient verbalized understanding.    Patient states she's been having a lot of pain in her abdomin and legs. Patient states she's feeling weak and achy.    Patient encouraged to schedule appt, but she states she's weak    Please advise.

## 2023-08-23 NOTE — TELEPHONE ENCOUNTER
----- Message from Cristobal Ann MD sent at 8/22/2023  5:00 PM CDT -----  Please inform the pt recent labs stable.

## 2023-08-24 NOTE — TELEPHONE ENCOUNTER
Yes, due to the severity of her chronic illness, I would need to see her in person to complete physical exam.

## 2023-08-24 NOTE — TELEPHONE ENCOUNTER
"Informed patient that she need to come in to be evaluated during conversation we had yesterday. Patient stated, " I'm tired and in pain." Requested virtual I informed patient in person visit is necessary d/t her condition.  "

## 2023-08-25 ENCOUNTER — TELEPHONE (OUTPATIENT)
Dept: FAMILY MEDICINE | Facility: CLINIC | Age: 58
End: 2023-08-25
Payer: MEDICAID

## 2023-08-25 NOTE — TELEPHONE ENCOUNTER
Spoke to patient and informed her of appt date and time.Patient verbalized understand and stated she'll be her for her appt.

## 2023-08-28 ENCOUNTER — INFUSION (OUTPATIENT)
Dept: INFUSION THERAPY | Facility: HOSPITAL | Age: 58
End: 2023-08-28
Attending: INTERNAL MEDICINE
Payer: MEDICAID

## 2023-08-28 VITALS
DIASTOLIC BLOOD PRESSURE: 92 MMHG | RESPIRATION RATE: 18 BRPM | TEMPERATURE: 98 F | SYSTOLIC BLOOD PRESSURE: 152 MMHG | HEART RATE: 87 BPM

## 2023-08-28 DIAGNOSIS — I42.9 CARDIOMYOPATHY, UNSPECIFIED TYPE: Primary | ICD-10-CM

## 2023-08-28 DIAGNOSIS — N18.4 CHRONIC KIDNEY DISEASE, STAGE IV (SEVERE): ICD-10-CM

## 2023-08-28 DIAGNOSIS — E11.9 DIABETES MELLITUS WITHOUT COMPLICATION: ICD-10-CM

## 2023-08-28 DIAGNOSIS — I11.0 HYPERTENSIVE HEART DISEASE WITH CONGESTIVE HEART FAILURE, UNSPECIFIED HEART FAILURE TYPE: ICD-10-CM

## 2023-08-28 LAB
ALBUMIN SERPL BCP-MCNC: 4 G/DL (ref 3.5–5.2)
ALP SERPL-CCNC: 64 U/L (ref 55–135)
ALT SERPL W/O P-5'-P-CCNC: 16 U/L (ref 10–44)
ANION GAP SERPL CALC-SCNC: 10 MMOL/L (ref 8–16)
ANISOCYTOSIS BLD QL SMEAR: ABNORMAL
AST SERPL-CCNC: 19 U/L (ref 10–40)
BASOPHILS # BLD AUTO: 0.01 K/UL (ref 0–0.2)
BASOPHILS NFR BLD: 0.3 % (ref 0–1.9)
BILIRUB SERPL-MCNC: 1.4 MG/DL (ref 0.1–1)
BNP SERPL-MCNC: 229 PG/ML (ref 0–99)
BUN SERPL-MCNC: 44 MG/DL (ref 6–20)
CALCIUM SERPL-MCNC: 9.2 MG/DL (ref 8.7–10.5)
CHLORIDE SERPL-SCNC: 113 MMOL/L (ref 95–110)
CO2 SERPL-SCNC: 17 MMOL/L (ref 23–29)
CREAT SERPL-MCNC: 2.3 MG/DL (ref 0.5–1.4)
DACRYOCYTES BLD QL SMEAR: ABNORMAL
DIFFERENTIAL METHOD: ABNORMAL
EOSINOPHIL # BLD AUTO: 0.1 K/UL (ref 0–0.5)
EOSINOPHIL NFR BLD: 2.8 % (ref 0–8)
ERYTHROCYTE [DISTWIDTH] IN BLOOD BY AUTOMATED COUNT: 30.7 % (ref 11.5–14.5)
EST. GFR  (NO RACE VARIABLE): 24 ML/MIN/1.73 M^2
GLUCOSE SERPL-MCNC: 116 MG/DL (ref 70–110)
HCT VFR BLD AUTO: 29.9 % (ref 37–48.5)
HGB BLD-MCNC: 9.3 G/DL (ref 12–16)
HYPOCHROMIA BLD QL SMEAR: ABNORMAL
IMM GRANULOCYTES # BLD AUTO: 0.03 K/UL (ref 0–0.04)
IMM GRANULOCYTES NFR BLD AUTO: 0.8 % (ref 0–0.5)
LYMPHOCYTES # BLD AUTO: 0.6 K/UL (ref 1–4.8)
LYMPHOCYTES NFR BLD: 14.1 % (ref 18–48)
MAGNESIUM SERPL-MCNC: 2.4 MG/DL (ref 1.6–2.6)
MCH RBC QN AUTO: 18.5 PG (ref 27–31)
MCHC RBC AUTO-ENTMCNC: 31.1 G/DL (ref 32–36)
MCV RBC AUTO: 59 FL (ref 82–98)
MONOCYTES # BLD AUTO: 0.4 K/UL (ref 0.3–1)
MONOCYTES NFR BLD: 9.6 % (ref 4–15)
NEUTROPHILS # BLD AUTO: 2.9 K/UL (ref 1.8–7.7)
NEUTROPHILS NFR BLD: 72.4 % (ref 38–73)
NRBC BLD-RTO: 0 /100 WBC
OVALOCYTES BLD QL SMEAR: ABNORMAL
PHOSPHATE SERPL-MCNC: 4.1 MG/DL (ref 2.7–4.5)
PLATELET # BLD AUTO: 283 K/UL (ref 150–450)
PLATELET BLD QL SMEAR: ABNORMAL
PMV BLD AUTO: ABNORMAL FL (ref 9.2–12.9)
POIKILOCYTOSIS BLD QL SMEAR: ABNORMAL
POLYCHROMASIA BLD QL SMEAR: ABNORMAL
POTASSIUM SERPL-SCNC: 4 MMOL/L (ref 3.5–5.1)
PROT SERPL-MCNC: 7.1 G/DL (ref 6–8.4)
RBC # BLD AUTO: 5.03 M/UL (ref 4–5.4)
SCHISTOCYTES BLD QL SMEAR: ABNORMAL
SCHISTOCYTES BLD QL SMEAR: PRESENT
SODIUM SERPL-SCNC: 140 MMOL/L (ref 136–145)
SPHEROCYTES BLD QL SMEAR: ABNORMAL
TARGETS BLD QL SMEAR: ABNORMAL
WBC # BLD AUTO: 3.96 K/UL (ref 3.9–12.7)

## 2023-08-28 PROCEDURE — 80053 COMPREHEN METABOLIC PANEL: CPT | Performed by: INTERNAL MEDICINE

## 2023-08-28 PROCEDURE — 83735 ASSAY OF MAGNESIUM: CPT | Performed by: INTERNAL MEDICINE

## 2023-08-28 PROCEDURE — 36591 DRAW BLOOD OFF VENOUS DEVICE: CPT

## 2023-08-28 PROCEDURE — 85025 COMPLETE CBC W/AUTO DIFF WBC: CPT | Performed by: INTERNAL MEDICINE

## 2023-08-28 PROCEDURE — 84100 ASSAY OF PHOSPHORUS: CPT | Performed by: INTERNAL MEDICINE

## 2023-08-28 PROCEDURE — 83880 ASSAY OF NATRIURETIC PEPTIDE: CPT | Performed by: INTERNAL MEDICINE

## 2023-08-28 PROCEDURE — 36415 COLL VENOUS BLD VENIPUNCTURE: CPT | Performed by: INTERNAL MEDICINE

## 2023-08-28 NOTE — PROGRESS NOTES
1120  Blood drawn from PICC line.  Right upper arm PICC line dressing change done using sterile technique.  No signs of infection noted.  Statlock device, bio patch and tegaderm dressing reapplied.  Tolerated well   dc to home in stable condition

## 2023-08-29 ENCOUNTER — OFFICE VISIT (OUTPATIENT)
Dept: PALLIATIVE MEDICINE | Facility: CLINIC | Age: 58
End: 2023-08-29
Payer: MEDICAID

## 2023-08-29 DIAGNOSIS — I50.9 CONGESTIVE HEART FAILURE, UNSPECIFIED HF CHRONICITY, UNSPECIFIED HEART FAILURE TYPE: Primary | ICD-10-CM

## 2023-08-29 DIAGNOSIS — Z51.5 PALLIATIVE CARE ENCOUNTER: ICD-10-CM

## 2023-08-29 PROCEDURE — 3044F PR MOST RECENT HEMOGLOBIN A1C LEVEL <7.0%: ICD-10-PCS | Mod: CPTII,95,, | Performed by: STUDENT IN AN ORGANIZED HEALTH CARE EDUCATION/TRAINING PROGRAM

## 2023-08-29 PROCEDURE — 3044F HG A1C LEVEL LT 7.0%: CPT | Mod: CPTII,95,, | Performed by: STUDENT IN AN ORGANIZED HEALTH CARE EDUCATION/TRAINING PROGRAM

## 2023-08-29 PROCEDURE — 99442 PR PHYSICIAN TELEPHONE EVALUATION 11-20 MIN: ICD-10-PCS | Mod: 95,,, | Performed by: STUDENT IN AN ORGANIZED HEALTH CARE EDUCATION/TRAINING PROGRAM

## 2023-08-29 PROCEDURE — 4010F PR ACE/ARB THEARPY RXD/TAKEN: ICD-10-PCS | Mod: CPTII,95,, | Performed by: STUDENT IN AN ORGANIZED HEALTH CARE EDUCATION/TRAINING PROGRAM

## 2023-08-29 PROCEDURE — 99442 PR PHYSICIAN TELEPHONE EVALUATION 11-20 MIN: CPT | Mod: 95,,, | Performed by: STUDENT IN AN ORGANIZED HEALTH CARE EDUCATION/TRAINING PROGRAM

## 2023-08-29 PROCEDURE — 4010F ACE/ARB THERAPY RXD/TAKEN: CPT | Mod: CPTII,95,, | Performed by: STUDENT IN AN ORGANIZED HEALTH CARE EDUCATION/TRAINING PROGRAM

## 2023-08-29 NOTE — PROGRESS NOTES
Palliative Medicine Clinic Note      Consult Requested By: No ref. provider found    Primary Care Physician:   No, Primary Doctor    Reason for Consult: Advance care planning and symptom management in the setting of heart Failure     The patient location is: [facility]  The chief complaint leading to consultation is: pain    Visit type: audio only    Face to Face time with patient: 0min  15 minutes of total time spent on the encounter, which includes face to face time and non-face to face time preparing to see the patient (eg, review of tests), Obtaining and/or reviewing separately obtained history, Documenting clinical information in the electronic or other health record, Independently interpreting results (not separately reported) and communicating results to the patient/family/caregiver, or Care coordination (not separately reported).       Each patient provided with medical services by telemedicine is:  (1) informed of the relationship between the physician and patient and the respective role of any other health care provider with respect to management of the patient; and (2) notified that he or she may decline to receive medical services by telemedicine and may withdraw from such care at any time.          ASSESSMENT/PLAN:     Plan/Recommendations:  Diagnoses and all orders for this visit:      Congestive heart failure, unspecified HF chronicity, unspecified heart failure type /  Acute decompensated heart failure / Pulmonary HTN  -NICM since 2013 HFrEF (EF 10%) s/p AICD placement  -Not a candidate for advance therapies   -recent admission for ADHF,last admission June  -now on palliative dobutamine   -Taking Bumex BID,   -coughing all day does not feel fluid overloaded  -discussed taking extra diuretics as needed and monitoring her weight and fluid intake      Pain   -On Norco 7.5-325mg q 8H PRN - to 1.5 tablets PRN      Fatigue/dyspnea  -discussed the importance of graded exercise  -continues to walk  around the house, however describes more weakness in her legs and stairs or getting difficult  -discussed PT home health versus outpatient      Depression/anxiety  -worsening depression  - discussed PM Behavioral therapy and  support   -lexapro to 10mg daily   -discussed avoiding Xanax as much as possible       Nausea  -  ondansetron (ZOFRAN-ODT) 8 MG TbDL; Take 1 tablet (8 mg total) by mouth every 12 (twelve) hours as needed (nausea).  -only having nausea weekly    Constipation  miralax  Linzess 72mctg / day  Having daily bowel movements       Palliative care encounter  Medicolegal: Has decision making capacity. Named her daughter Erika Estrada is HCPOA.     Psychosocial:  support system consists of granddaughter, daughter and extended faily     Spiritual: Buddhism    Understanding of disease and Illness Trajectory: Patient  has  adequate understanding of her illness, they can benefit from continued education on what to expect in the future.      Goals of care:      ACP Date: 07/11/2023    I engaged the patient and   in a voluntary conversation about advance care planning and we specifically addressed what the goals of care would be moving forward, in light of the patient's change in clinical status, specifically worsening heart failure with multiple admissions.  We did specifically address the patient's likely prognosis, which is poor.  I shared that in the best case scenario her prognosis might be month and that given the nature of her heart condition she can die any time. I did explain the role for hospice care at this stage of the patient's illness, including its ability to help the patient live with the best quality of life possible.       We explored the patient's values and preferences for future care.  The patient endorses that what is most important right now is to focus on curative/life-prolongation (regardless of treatment burdens),.  She states that she is doing everything in her power  to be evaluated at [facility] because she is hoping to obtain a heart transplant.  Her  encouraged her to do everything as fast as possible so that she can go to [place] soon.     Accordingly, we have decided that the best plan to meet the patient's goals includes continuing with treatment           ACP Date: 05/24/2023  I engaged the patient in a conversation about advance care planning and we specifically addressed what the goals of care would be moving forward, in light of the patient's change in clinical status, specifically now on palliative dobutamine.  We did specifically address the patient's likely prognosis, which is poor.  We discussed that dobutamine is mainly to help her feel better and will not change her heart condition. We explored the patient's values and preferences for future care.  The patient endorses that what is most important right now is to focus on remaining as independent as possible, symptom/pain control, and curative/life-prolongation (regardless of treatment burdens).  She shared that she is hoping for the better that she is hoping to get better.  She hopes that her heart will get better, she shared that she knows that she has been told in the past that it will not improve.  She shared that she is hoping for a miracle to happen so that her heart improves.  She is also hoping to be able to spend more time with family to be part of the activities to go out and enjoy eating with them.  Accordingly, we have decided that the best plan to meet the patient's goals includes continuing with treatment        ACP Date: 02/27/2023  I engaged the patient in a conversation about advance care planning.  We discussed HCPOA, she wanted to change her current HCPOA and name her daughter Erika Estrada as her agent and her sister and son as backups. We specifically addressed what the goals of care would be moving forward, in light of the patient's change in clinical status, specifically  transplant workup.  We did specifically address the patient's likely prognosis, which is poor w/o a trasnplant.  We explored the patient's values and preferences for future care.  The patient endorses that what is most important right now is to focus on curative/life-prolongation (regardless of treatment burdens)  Accordingly, we have decided that the best plan to meet the patient's goals includes continuing with treatment      Code status:  Full code    Advance directives:HCPOA on file       min time was spent on advance care planning, goals of care discussion, emotional support, formulating and communicating prognosis and goals of care, exploring burden/benefit of various approaches of treatment.          Follow up: 1m         SUBJECTIVE:     History obtained from: patient     complaint: No chief complaint on file.      History of Present Illness / Interval History:  Hafsa Hawley is 57 y.o. year old female presenting with heart failure .  Referred to Palliative Care for evaluation and management of physical symptoms. female attended the appointment alone       [facility] admission for ADHF 6/27  CKD plus [place] from cardiorenal syndrome and ADCHF. She now is close to euvolemic. On  dobutamine 5mcg/kg/min and Bumex   She reports nausea occasionally, having daily bowel movements.  Has cough all day however she does not feel swollen.  He feels her legs are weaker and now stairs her becoming difficult to navigate.    -----------  She states that she has not been feeling good.  She has abdominal pain significant constipation.  She also describes nausea and bloating.  She shared that her medications are constantly changing so it is difficult for her to keep track of those changes.  She is compliant with medications she is drinking some water because she feels very dry.  She has an appointment with Cardiology on the 17th    -----    She reports that she is feeling okay now that she is on the dobutamine pump.  She feels  it has made a difference in her energy level and that she is not eating too much oxygen anymore.  She continues to hope for improvement.  However she is unable to do the things that she enjoys like spending time with her grandkids and going out because sometimes she is not feeling well.  She feels fatigued she has a lot of pain in her knees and her hips and legs.  She is unable to walk at times.  She also endorsed decreased appetite and significant nausea that improves with Zofran however she needs to take 8 mg for it to work.       -      Disease History:  NICM since 2013 HFrEF (EF 10%) s/p AICD placement  pulmonary hypertension  chronic hypoxic and hypercapnic respiratory failure   Paroxysmal A Fib on Eliquis, Pulmonary Hypertension  Hypertension, Type 2 Diabetes Mellitus     evaluated by our team and presented to committee on 3/9/22.  At the time she was declined for OHT or LVAD due to renal function, lung function and difficulty consistently following up - undergoing evaluation for heart-kidney transplant    Previous experience or exposure to a serious illness: no      External  database queried on 08/29/2023  by Sandra Hernandes .   The results reviewed and considered with the clinical data in the decision whether or not to prescribe a controlled substance.  07/07/2023 07/07/2023   1  Alprazolam 2 Mg Tablet 60.00  30  Ch Par  [id number]   [facility] ([id number])  0  8.00 LME T Comm Ins  [place]    07/07/2023 07/06/2023   1  Hydrocodone-Acetamin 7.5-325 90.00  30  Da Nod  [id number]   [place]ara ([id number])  0  22.50 [place]    06/19/2023 06/12/2023   3  Promethazine-Codeine Solution 177.00  6  Ch Par  [id number]   Och (1974)  0  8.85 [place]ME [place] Comm [place]    06/06/2023 03/13/2023   1  Alprazolam 2 Mg Tablet 60.00  30  Ch Par  [id number]   Mara ([id number])  3             Medications:    Current Outpatient Medications:     albuterol-ipratropium (DUO-NEB) 2.5 mg-0.5 mg/3 mL nebulizer solution, Take 3 mLs by nebulization every 6 (six) hours  as needed for Wheezing or Shortness of Breath., Disp: 90 mL, Rfl: 3    allopurinoL (ZYLOPRIM) 100 MG tablet, Take 1 tablet every day by oral route for 30 days., Disp: , Rfl:     ALPRAZolam (XANAX) 2 MG Tab, Take 2 mg by mouth 2 (two) times daily., Disp: , Rfl:     amiodarone (PACERONE) 200 MG Tab, Take 200 mg by mouth., Disp: , Rfl:     atorvastatin (LIPITOR) 40 MG tablet, Take 1 tablet (40 mg total) by mouth every evening., Disp: 90 tablet, Rfl: 3    b complex vitamins tablet, Take 1 tablet by mouth once daily., Disp: , Rfl:     blood sugar diagnostic (ACCU-CHEK GUIDE TEST STRIPS) Strp, 1 strip by Other route 3 (three) times daily., Disp: 100 each, Rfl: 11    blood-glucose sensor (DEXCOM G7 SENSOR) Evon, 1 Device by Misc.(Non-Drug; Combo Route) route every 10 days., Disp: 3 each, Rfl: 11    budesonide-formoterol 80-4.5 mcg (SYMBICORT) 80-4.5 mcg/actuation HFAA, Inhale 2 puffs twice a day by inhalation route., Disp: , Rfl:     bumetanide (BUMEX) 2 MG tablet, Take 2 tablets (4mg total) by mouth in morning and take 1 tablet (2mg total) by mouth in the evening (no later then 5pm)., Disp: 90 tablet, Rfl: 5    cetirizine (ZYRTEC) 5 MG tablet, Take 1 tablet (5 mg total) by mouth daily as needed for Allergies., Disp: , Rfl:     diclofenac sodium (VOLTAREN) 1 % Gel, APPLY 2 GRAMS TOPICALLY 3 (THREE) TIMES DAILY AS NEEDED (PAIN)., Disp: 300 g, Rfl: 1    DOBUTamine (DOBUTREX) 1,000 mg/250 mL (4,000 mcg/mL) infusion, Inject 409 mcg/min into the vein continuous., Disp: , Rfl:     ELIQUIS 5 mg Tab, TAKE 1 TABLET (5 MG TOTAL) BY MOUTH 2 (TWO) TIMES DAILY., Disp: 60 tablet, Rfl: 2    ergocalciferol (ERGOCALCIFEROL) 50,000 unit Cap, Take 1 capsule (50,000 Units total) by mouth every Saturday., Disp: 12 capsule, Rfl: 1    ferrous sulfate (FEOSOL) 325 mg (65 mg iron) Tab tablet, Take 1 tablet (325 mg total) by mouth once daily., Disp: , Rfl: 0    fluticasone propionate (FLONASE) 50 mcg/actuation nasal spray, 2 sprays by Each Nostril  route daily as needed for Rhinitis. , Disp: , Rfl:     HYDROcodone-acetaminophen (NORCO)  mg per tablet, Take 1 tablet by mouth every 8 (eight) hours as needed for Pain., Disp: 90 tablet, Rfl: 0    HYDROcodone-acetaminophen (NORCO) 7.5-325 mg per tablet, Take 1 tablet by mouth every 8 (eight) hours as needed for Pain., Disp: 90 tablet, Rfl: 0    isosorbide-hydrALAZINE 20-37.5 mg (BIDIL) 20-37.5 mg Tab, Take 1 tablet by mouth 3 (three) times daily., Disp: 90 tablet, Rfl: 11    JARDIANCE 25 mg tablet, Take 25 mg by mouth., Disp: , Rfl:     lancets (ACCU-CHEK SOFTCLIX LANCETS) Misc, 1 Device by Misc.(Non-Drug; Combo Route) route 3 (three) times daily., Disp: 100 each, Rfl: 11    LIDOcaine (LIDODERM) 5 %, Place 1 patch onto the skin once daily. Remove & Discard patch within 12 hours or as directed by MD, Disp: 30 patch, Rfl: 2    linaCLOtide (LINZESS) 72 mcg Cap capsule, Take 1 capsule (72 mcg total) by mouth before breakfast., Disp: 30 capsule, Rfl: 1    metoprolol succinate (TOPROL-XL) 50 MG 24 hr tablet, 2 tabs by mouth in am and 1 tab by mouth in pm., Disp: , Rfl:     mometasone-formoterol (DULERA) 200-5 mcg/actuation inhaler, Inhale 2 puffs into the lungs 2 (two) times daily. Controller, Disp: 13 g, Rfl: 11    ondansetron (ZOFRAN-ODT) 8 MG TbDL, Take 8 mg by mouth every 8 (eight) hours as needed., Disp: , Rfl:     pantoprazole (PROTONIX) 40 MG tablet, TAKE 1 TABLET BY MOUTH DAILY IN THE MORNING, Disp: 30 tablet, Rfl: 11    pregabalin (LYRICA) 50 MG capsule, Take 1 capsule (50 mg total) by mouth every evening., Disp: 60 capsule, Rfl: 5    promethazine (PHENERGAN) 25 MG suppository, Place 1 suppository (25 mg total) rectally every 6 (six) hours as needed for Nausea., Disp: 10 suppository, Rfl: 5    promethazine-codeine 6.25-10 mg/5 ml (PHENERGAN WITH CODEINE) 6.25-10 mg/5 mL syrup, Take 5 mLs by mouth every 8 (eight) hours as needed for Cough., Disp: 240 mL, Rfl: 5    rOPINIRole (REQUIP) 4 MG tablet, TAKE 1  TABLET (4 MG TOTAL) BY MOUTH ONCE DAILY. PT TAKING 4MG DAILY, Disp: 90 tablet, Rfl: 1    sacubitriL-valsartan (ENTRESTO) 49-51 mg per tablet, Entresto 49 mg-51 mg tablet, [RxNorm: 4001353], Disp: , Rfl:     scopolamine (TRANSDERM-SCOP) 1.3-1.5 mg (1 mg over 3 days), SMARTSI Patch(s) Topical Every 3 Days PRN, Disp: , Rfl:     semaglutide (OZEMPIC) 0.25 mg or 0.5 mg (2 mg/3 mL) pen injector, Inject 0.5 mg into the skin every 7 days., Disp: 1 each, Rfl: 11    sertraline (ZOLOFT) 50 MG tablet, Take 1 tablet (50 mg total) by mouth once daily., Disp: 30 tablet, Rfl: 3    SPIRIVA WITH HANDIHALER 18 mcg inhalation capsule, Inhale 1 capsule (18 mcg total) into the lungs once daily., Disp: 30 capsule, Rfl: 5    traZODone (DESYREL) 50 MG tablet, traZODone 50 mg tablet, [RxNorm: 031237], Disp: , Rfl:     VENTOLIN HFA 90 mcg/actuation inhaler, Inhale 2 puffs into the lungs every 6 (six) hours as needed for Shortness of Breath or Wheezing., Disp: 18 g, Rfl: 11  No current facility-administered medications for this visit.    Facility-Administered Medications Ordered in Other Visits:     0.9%  NaCl infusion, , Intravenous, Continuous, Corinna Hayes NP, New Bag at 19 0745    vancomycin in dextrose 5 % 1 gram/250 mL IVPB 1,000 mg, 1,000 mg, Intravenous, On Call Procedure, Corinna Hayes NP, 1,000 mg at 19 0736      Past Medical History:   Diagnosis Date    Anemia     Arthritis     Atrial fibrillation     OAC    Chronic respiratory failure with hypoxia, on home oxygen therapy     2L with activity, off at rest.  Per Pulm  no overt evidence of ILD or COPD on PFTs and CT to explain O2 needs.    CKD (chronic kidney disease), stage IV 2018    Congestive heart failure     s/p AICD placement,    Deep vein thrombosis     Depression     elevated bilirubin d/t Gilbert's syndrome     confirmed by [place] genetic testing, evaluated by hepatology    Encounter for blood transfusion     GERD (gastroesophageal  reflux disease)     Hypertension     Pheochromocytoma, malignant     Right kidney mass     Sleep apnea     Thalassemia trait, alpha     Thyroid disease     Type 2 diabetes mellitus with hyperglycemia, without long-term current use of insulin 08/13/2020     Past Surgical History:   Procedure Laterality Date    APPENDECTOMY      BONE MARROW BIOPSY      CARDIAC DEFIBRILLATOR PLACEMENT Left 12/2016    CARDIAC ELECTROPHYSIOLOGY MAPPING AND ABLATION      CARDIAC ELECTROPHYSIOLOGY MAPPING AND ABLATION      COLONOSCOPY N/A 5/6/2022    Procedure: COLONOSCOPY;  Surgeon: Arely Betancourt MD;  Location: [facility] ENDO (2ND FLR);  Service: Endoscopy;  Laterality: N/A;  heart transplant candidate/ EF 25% - 2nd floor/ defib - Biotronik - ERW  Eliquis - per Dr. Cortez with [facility], Pt ok to hold Eliquis x 2 days prior-see media tab-outside correspondence dated 12/30/21  - ERW  verbal instructions/portal instructions/email instructions - s    EYE SURGERY      due to running tears    FRACTURE SURGERY Left     hand 5th digit    HYSTERECTOMY      KNEE SURGERY Left 2016    hematoma    LIVER BIOPSY  10/24/2018    Minimal steatosis, predominantly macrovesicular, 1%, Minimal nonspecific portal inflammation, no fibrosis. No findings on biopsy to explain elevated bilirubin levels. Could be d/t Gilbert's =?- hemolysis    RIGHT HEART CATHETERIZATION Right 12/07/2021    Procedure: INSERTION, CATHETER, RIGHT HEART;  Surgeon: Irving Cardenas MD;  Location: [facility] CATH LAB;  Service: Cardiology;  Laterality: Right;    RIGHT HEART CATHETERIZATION Right 12/19/2022    Procedure: INSERTION, CATHETER, RIGHT HEART;  Surgeon: Burke Camilo MD;  Location: [facility] CATH LAB;  Service: Cardiology;  Laterality: Right;    RIGHT HEART CATHETERIZATION Right 3/29/2023    Procedure: INSERTION, CATHETER, RIGHT HEART;  Surgeon: Katie Liriano DO;  Location: [facility] CATH LAB;  Service: Cardiology;  Laterality: Right;    TRANSJUGULAR BIOPSY OF LIVER N/A 10/24/2018  "   Procedure: BIOPSY, LIVER, TRANSJUGULAR APPROACH;  Surgeon: Carmen Diagnostic Provider;  Location: [facility] OR [address] [place] [address];  Service: Radiology;  Laterality: N/A;     Family History   Problem Relation Age of Onset    Cancer Mother         pancreatic CA early 50's    Heart disease Father          MI in late 50's    Hypertension Father     Heart attack Father     Heart disease Sister     Heart disease Brother     Cirrhosis Brother         alcoholic    Heart disease Sister     Heart disease Brother     Hypertension Brother     Diabetes Brother      Review of patient's allergies indicates:   Allergen Reactions    Penicillins Hives and Other (See Comments)    Iodinated contrast media Nausea And Vomiting    Oxycodone-acetaminophen Other (See Comments)     Nausea, Dizziness, Anxiety.  "I don't like how it makes me feel."   Given Hydromorphone 0.5mg IVP  Without problems.  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 Metabolism of other drugs not listed here may also be impacted by UGT1A1 enzyme activity.       Tramadol Nausea And Vomiting and Other (See Comments)     Other reaction(s): Other (See Comments)    Valsartan Other (See Comments)       OBJECTIVE:       Review of Symptoms      Symptom Assessment (ESAS 0-10 Scale)  Pain:  0  Dyspnea:  2  Anxiety:  0  Nausea:  4  Depression:  0  Anorexia:  0  Fatigue:  0  Insomnia:  0  Restlessness:  0  Agitation:  0     CAM / Delirium:  " "Negative  Constipation:  Negative  Diarrhea:  Negative    Anxiety:  Is not nervous/anxious  Constipation:  Constipation    Comments:  Miralax    Pain Assessment:    Location(s): leg    Leg       Location: bilateral        Quality: Aching        Quantity: 10/10 in intensity        Chronicity: Onset 1 week(s) ago, rapidly worsening        Aggravating Factors: None        Alleviating Factors: None        Associated Symptoms: None    Performance Status:  60    Living Arrangements:  Lives with family    Psychosocial/Cultural:   See Palliative Psychosocial Note: Yes  Lives with  and granddaughter. Pt lives in a mobile home with 5 NIRAJ. Pt also has support from her two sisters Benoit Baum [phone], Jeanie Jaimes [phone]. Pt is mostly independent with ADL's, but has a RW, SC and home oxygen, which she states she uses "as needed".   dgtr Erika Estrada, 33, [place], son Miguel Baum, 35  **Primary  to Follow**  Palliative Care  Consult: Yes    Spiritual:  F - Damaris and Belief:  Mandaen       Advance Care Planning   Advance Directives:   Living Will: No    LaPOST: No    Do Not Resuscitate Status: No    Medical Power of : Yes    Agent's Name:  Erika Estrada    Decision Making:  Patient answered questions  Goals of Care: What is most important right now is to focus on curative/life-prolongation (regardless of treatment burdens), remaining as independent as possible, symptom/pain control. Accordingly, we have decided that the best plan to meet the patient's goals includes continuing with treatment.              Physical Exam:  Vitals:  Audio visit only  Physical Exam      Labs:  CBC:   WBC   Date Value Ref Range Status   08/28/2023 3.96 3.90 - 12.70 K/uL Final       Hemoglobin   Date Value Ref Range Status   08/28/2023 9.3 (L) 12.0 - 16.0 g/dL Final       POC Hematocrit   Date Value Ref Range Status   12/07/2021 45 36 - 54 %PCV Final     Hematocrit   Date Value Ref Range Status "   08/28/2023 29.9 (L) 37.0 - 48.5 % Final       MCV   Date Value Ref Range Status   08/28/2023 59 (L) 82 - 98 fL Final       Platelets   Date Value Ref Range Status   08/28/2023 283 150 - 450 K/uL Final       LFT:   Lab Results   Component Value Date    AST 19 08/28/2023    ALKPHOS 64 08/28/2023    BILITOT 1.4 (H) 08/28/2023       Albumin:   Albumin   Date Value Ref Range Status   08/28/2023 4.0 3.5 - 5.2 g/dL Final     Protein:   Total Protein   Date Value Ref Range Status   08/28/2023 7.1 6.0 - 8.4 g/dL Final         Radiology:I have reviewed all pertinent imaging results/findings within the past 24 hours.  Results for orders placed during the hospital encounter of 03/26/23    Echo    Interpretation Summary  · The left ventricle is severely enlarged with eccentric hypertrophy and severely decreased systolic function. The estimated ejection fraction is 15%.  · Normal right ventricular size with moderately reduced right ventricular systolic function.  · Grade II left ventricular diastolic dysfunction.  · Biatrial enlargement.  · Mild mitral regurgitation.  · Small pericardial effusion.  · The estimated PA systolic pressure is 51 mmHg (by tricuspid regurgitation velocity). The estimated mean PA pressure is 39mm Hg.  · Intermediate central venous pressure (8 mmHg).        I spent a total of 15 minutes on the day of the visit. This includes face to face time in discussion of goals of care, symptom assessment, coordination of care and emotional support.  This also includes non-face to face time preparing to see the patient (eg, review of tests/imaging), obtaining and/or reviewing separately obtained history, documenting clinical information in the electronic or other health record, independently interpreting results and communicating results to the patient/family/caregiver, or care coordinator.       This note was partially created using [employer] Voice Recognition software. Typographical and content errors may occur with  this process. While efforts are made to detect and correct such errors, in some cases errors will persist. For this reason, wording in this document should be considered in the proper context and not strictly verbatim.      Signature: Sandra Hernandes MD

## 2023-09-03 ENCOUNTER — HOSPITAL ENCOUNTER (EMERGENCY)
Facility: HOSPITAL | Age: 58
Discharge: HOME | End: 2023-09-03
Attending: STUDENT IN AN ORGANIZED HEALTH CARE EDUCATION/TRAINING PROGRAM
Payer: MEDICAID

## 2023-09-03 VITALS
DIASTOLIC BLOOD PRESSURE: 79 MMHG | OXYGEN SATURATION: 98 % | WEIGHT: 184.31 LBS | HEIGHT: 63 IN | SYSTOLIC BLOOD PRESSURE: 145 MMHG | BODY MASS INDEX: 32.66 KG/M2 | RESPIRATION RATE: 19 BRPM | TEMPERATURE: 99 F | HEART RATE: 67 BPM

## 2023-09-03 DIAGNOSIS — R11.0 NAUSEA: ICD-10-CM

## 2023-09-03 DIAGNOSIS — R05.3 CHRONIC COUGH: ICD-10-CM

## 2023-09-03 DIAGNOSIS — R10.9 CHRONIC ABDOMINAL PAIN: ICD-10-CM

## 2023-09-03 DIAGNOSIS — F43.9 STRESS: Primary | ICD-10-CM

## 2023-09-03 DIAGNOSIS — G89.29 CHRONIC ABDOMINAL PAIN: ICD-10-CM

## 2023-09-03 LAB
ACANTHOCYTES BLD QL SMEAR: PRESENT
ALBUMIN SERPL BCP-MCNC: 4.1 G/DL (ref 3.5–5.2)
ALP SERPL-CCNC: 64 U/L (ref 55–135)
ALT SERPL W/O P-5'-P-CCNC: 16 U/L (ref 10–44)
ANION GAP SERPL CALC-SCNC: 10 MMOL/L (ref 8–16)
ANISOCYTOSIS BLD QL SMEAR: ABNORMAL
AST SERPL-CCNC: 19 U/L (ref 10–40)
BASOPHILS # BLD AUTO: 0.02 K/UL (ref 0–0.2)
BASOPHILS NFR BLD: 0.5 % (ref 0–1.9)
BILIRUB SERPL-MCNC: 0.9 MG/DL (ref 0.1–1)
BILIRUB UR QL STRIP: NEGATIVE
BUN SERPL-MCNC: 43 MG/DL (ref 6–20)
CALCIUM SERPL-MCNC: 8.7 MG/DL (ref 8.7–10.5)
CHLORIDE SERPL-SCNC: 111 MMOL/L (ref 95–110)
CK SERPL-CCNC: 61 U/L (ref 20–180)
CLARITY UR: CLEAR
CO2 SERPL-SCNC: 20 MMOL/L (ref 23–29)
COLOR UR: YELLOW
CREAT SERPL-MCNC: 2.8 MG/DL (ref 0.5–1.4)
DACRYOCYTES BLD QL SMEAR: ABNORMAL
DIFFERENTIAL METHOD: ABNORMAL
EOSINOPHIL # BLD AUTO: 0.1 K/UL (ref 0–0.5)
EOSINOPHIL NFR BLD: 2 % (ref 0–8)
ERYTHROCYTE [DISTWIDTH] IN BLOOD BY AUTOMATED COUNT: 30.8 % (ref 11.5–14.5)
EST. GFR  (NO RACE VARIABLE): 19 ML/MIN/1.73 M^2
GLUCOSE SERPL-MCNC: 61 MG/DL (ref 70–110)
GLUCOSE UR QL STRIP: ABNORMAL
HCT VFR BLD AUTO: 29.6 % (ref 37–48.5)
HGB BLD-MCNC: 9.2 G/DL (ref 12–16)
HGB UR QL STRIP: ABNORMAL
HYPOCHROMIA BLD QL SMEAR: ABNORMAL
IMM GRANULOCYTES # BLD AUTO: 0.03 K/UL (ref 0–0.04)
IMM GRANULOCYTES NFR BLD AUTO: 0.8 % (ref 0–0.5)
KETONES UR QL STRIP: NEGATIVE
LEUKOCYTE ESTERASE UR QL STRIP: NEGATIVE
LYMPHOCYTES # BLD AUTO: 0.7 K/UL (ref 1–4.8)
LYMPHOCYTES NFR BLD: 17.4 % (ref 18–48)
MCH RBC QN AUTO: 18.5 PG (ref 27–31)
MCHC RBC AUTO-ENTMCNC: 31.1 G/DL (ref 32–36)
MCV RBC AUTO: 60 FL (ref 82–98)
MONOCYTES # BLD AUTO: 0.5 K/UL (ref 0.3–1)
MONOCYTES NFR BLD: 12.4 % (ref 4–15)
NEUTROPHILS # BLD AUTO: 2.7 K/UL (ref 1.8–7.7)
NEUTROPHILS NFR BLD: 66.9 % (ref 38–73)
NITRITE UR QL STRIP: NEGATIVE
NRBC BLD-RTO: 1 /100 WBC
PH UR STRIP: 6 [PH] (ref 5–8)
PLATELET # BLD AUTO: 183 K/UL (ref 150–450)
PLATELET BLD QL SMEAR: ABNORMAL
PMV BLD AUTO: ABNORMAL FL (ref 9.2–12.9)
POIKILOCYTOSIS BLD QL SMEAR: ABNORMAL
POLYCHROMASIA BLD QL SMEAR: ABNORMAL
POTASSIUM SERPL-SCNC: 3.7 MMOL/L (ref 3.5–5.1)
PROT SERPL-MCNC: 7.3 G/DL (ref 6–8.4)
PROT UR QL STRIP: NEGATIVE
RBC # BLD AUTO: 4.96 M/UL (ref 4–5.4)
SCHISTOCYTES BLD QL SMEAR: PRESENT
SODIUM SERPL-SCNC: 141 MMOL/L (ref 136–145)
SP GR UR STRIP: 1.01 (ref 1–1.03)
SPHEROCYTES BLD QL SMEAR: ABNORMAL
TARGETS BLD QL SMEAR: ABNORMAL
TROPONIN I SERPL DL<=0.01 NG/ML-MCNC: 1.4 NG/ML (ref 0–0.03)
TROPONIN I SERPL DL<=0.01 NG/ML-MCNC: 1.44 NG/ML (ref 0–0.03)
URN SPEC COLLECT METH UR: ABNORMAL
UROBILINOGEN UR STRIP-ACNC: NEGATIVE EU/DL
WBC # BLD AUTO: 3.96 K/UL (ref 3.9–12.7)

## 2023-09-03 PROCEDURE — 81003 URINALYSIS AUTO W/O SCOPE: CPT | Performed by: SURGERY

## 2023-09-03 PROCEDURE — 82550 ASSAY OF CK (CPK): CPT | Performed by: SURGERY

## 2023-09-03 PROCEDURE — 25000003 PHARM REV CODE 250: Performed by: SURGERY

## 2023-09-03 PROCEDURE — 99285 EMERGENCY DEPT VISIT HI MDM: CPT | Mod: 25

## 2023-09-03 PROCEDURE — 93010 ELECTROCARDIOGRAM REPORT: CPT | Mod: ,,, | Performed by: INTERNAL MEDICINE

## 2023-09-03 PROCEDURE — 85025 COMPLETE CBC W/AUTO DIFF WBC: CPT | Performed by: SURGERY

## 2023-09-03 PROCEDURE — 84484 ASSAY OF TROPONIN QUANT: CPT | Mod: 91 | Performed by: SURGERY

## 2023-09-03 PROCEDURE — 80053 COMPREHEN METABOLIC PANEL: CPT | Performed by: SURGERY

## 2023-09-03 PROCEDURE — 93005 ELECTROCARDIOGRAM TRACING: CPT

## 2023-09-03 PROCEDURE — 99900031 HC PATIENT EDUCATION (STAT)

## 2023-09-03 PROCEDURE — 93010 EKG 12-LEAD: ICD-10-PCS | Mod: ,,, | Performed by: INTERNAL MEDICINE

## 2023-09-03 PROCEDURE — 83880 ASSAY OF NATRIURETIC PEPTIDE: CPT | Performed by: SURGERY

## 2023-09-03 PROCEDURE — 36415 COLL VENOUS BLD VENIPUNCTURE: CPT | Performed by: SURGERY

## 2023-09-03 PROCEDURE — 99900035 HC TECH TIME PER 15 MIN (STAT)

## 2023-09-03 RX ORDER — BENZONATATE 100 MG/1
200 CAPSULE ORAL 3 TIMES DAILY PRN
Qty: 20 CAPSULE | Refills: 0 | Status: SHIPPED | OUTPATIENT
Start: 2023-09-03 | End: 2023-09-13

## 2023-09-03 RX ORDER — MAG HYDROX/ALUMINUM HYD/SIMETH 200-200-20
30 SUSPENSION, ORAL (FINAL DOSE FORM) ORAL ONCE
Status: COMPLETED | OUTPATIENT
Start: 2023-09-03 | End: 2023-09-03

## 2023-09-03 RX ORDER — LIDOCAINE HYDROCHLORIDE 20 MG/ML
15 SOLUTION OROPHARYNGEAL ONCE
Status: COMPLETED | OUTPATIENT
Start: 2023-09-03 | End: 2023-09-03

## 2023-09-03 RX ORDER — SUCRALFATE 1 G/1
1 TABLET ORAL 4 TIMES DAILY
Qty: 120 TABLET | Refills: 0 | Status: SHIPPED | OUTPATIENT
Start: 2023-09-03 | End: 2023-10-03

## 2023-09-03 RX ADMIN — ALUMINUM HYDROXIDE, MAGNESIUM HYDROXIDE, AND DIMETHICONE 30 ML: 200; 20; 200 SUSPENSION ORAL at 09:09

## 2023-09-03 RX ADMIN — LIDOCAINE HYDROCHLORIDE 15 ML: 20 SOLUTION ORAL; TOPICAL at 09:09

## 2023-09-03 NOTE — ED TRIAGE NOTES
58 y.o. female presents to ER Room/bed info not found   Chief Complaint   Patient presents with    Abdominal Pain   .   C/o epigastric pain on and off for a month, c/o nausea and productive cough

## 2023-09-04 LAB — BNP SERPL-MCNC: 454 PG/ML (ref 0–99)

## 2023-09-04 NOTE — ED NOTES
Pts grand daughter called wanting to speak to patient. Pt states she does not want phone calls right now. Pt states she does not want us to give updates to her family at this time.

## 2023-09-04 NOTE — ED PROVIDER NOTES
"Encounter Date: 9/3/2023       History     Chief Complaint   Patient presents with    Abdominal Pain     58 y.o. female that presents with chronic cough & abdominal pain for months  Patient states she is had left-sided abdominal pain for several months now  Patient has never followed up with a physician with a history of constipation  Patient also states she is on Protonix, hurts with spicy food on ER interview  Patient also has had a chronic cough since starting her daily Dobutrex drip  Patient states the cough syrup has not helped, would like to try some pills        Review of patient's allergies indicates:   Allergen Reactions    Penicillins Hives and Other (See Comments)    Iodinated contrast media Nausea And Vomiting    Oxycodone-acetaminophen Other (See Comments)     Nausea, Dizziness, Anxiety.  "I don't like how it makes me feel."   Given Hydromorphone 0.5mg IVP  Without problems.  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 Metabolism of other drugs not listed here may also be impacted by UGT1A1 enzyme activity.       Tramadol Nausea And Vomiting and Other (See Comments)     Other reaction(s): Other (See Comments)    Valsartan Other (See Comments)     Past Medical History:   Diagnosis Date    Anemia     Arthritis     Atrial fibrillation     OAC    Chronic respiratory failure with hypoxia, on home oxygen therapy     2L with activity, off at rest.  Per Pulm  no " overt evidence of ILD or COPD on PFTs and CT to explain O2 needs.    CKD (chronic kidney disease), stage IV 05/08/2018    Congestive heart failure     s/p AICD placement,    Deep vein thrombosis     Depression     elevated bilirubin d/t Gilbert's syndrome     confirmed by [facility] genetic testing, evaluated by hepatology    Encounter for blood transfusion     GERD (gastroesophageal reflux disease)     Hypertension     Pheochromocytoma, malignant     Right kidney mass     Sleep apnea     Thalassemia trait, alpha     Thyroid disease     Type 2 diabetes mellitus with hyperglycemia, without long-term current use of insulin 08/13/2020     Past Surgical History:   Procedure Laterality Date    APPENDECTOMY      BONE MARROW BIOPSY      CARDIAC DEFIBRILLATOR PLACEMENT Left 12/2016    CARDIAC ELECTROPHYSIOLOGY MAPPING AND ABLATION      CARDIAC ELECTROPHYSIOLOGY MAPPING AND ABLATION      COLONOSCOPY N/A 5/6/2022    Procedure: COLONOSCOPY;  Surgeon: Arely Betancourt MD;  Location: [facility] ENDO ([address] [place] [address]);  Service: Endoscopy;  Laterality: N/A;  heart transplant candidate/ EF 25% - 2nd floor/ defib - Biotronik - ERW  Eliquis - per Dr. Cortez with [facility] [place], Pt ok to hold Eliquis x 2 days prior-see media tab-outside correspondence dated 12/30/21  - ERW  verbal instructions/portal instructions/email instructions - s    EYE SURGERY      due to running tears    FRACTURE SURGERY Left     hand 5th digit    HYSTERECTOMY      KNEE SURGERY Left 2016    hematoma    LIVER BIOPSY  10/24/2018    Minimal steatosis, predominantly macrovesicular, 1%, Minimal nonspecific portal inflammation, no fibrosis. No findings on biopsy to explain elevated bilirubin levels. Could be d/t Gilbert's =?- hemolysis    RIGHT HEART CATHETERIZATION Right 12/07/2021    Procedure: INSERTION, CATHETER, RIGHT HEART;  Surgeon: Irving Cardenas MD;  Location: [facility] CATH LAB;  Service: Cardiology;  Laterality: Right;    RIGHT HEART CATHETERIZATION Right 12/19/2022    Procedure:  INSERTION, CATHETER, RIGHT HEART;  Surgeon: Burke Camilo MD;  Location: [facility] CATH LAB;  Service: Cardiology;  Laterality: Right;    RIGHT HEART CATHETERIZATION Right 3/29/2023    Procedure: INSERTION, CATHETER, RIGHT HEART;  Surgeon: Katie Liriano DO;  Location: [facility] CATH LAB;  Service: Cardiology;  Laterality: Right;    TRANSJUGULAR BIOPSY OF LIVER N/A 10/24/2018    Procedure: BIOPSY, LIVER, TRANSJUGULAR APPROACH;  [facility];  Location: [facility] OR [address] [place] [address];  Service: Radiology;  Laterality: N/A;     Family History   Problem Relation Age of Onset    Cancer Mother         pancreatic CA early 50's    Heart disease Father          MI in late 50's    Hypertension Father     Heart attack Father     Heart disease Sister     Heart disease Brother     Cirrhosis Brother         alcoholic    Heart disease Sister     Heart disease Brother     Hypertension Brother     Diabetes Brother      Social History     Tobacco Use    Smoking status: Never    Smokeless tobacco: Never   Substance Use Topics    Alcohol use: Not Currently     Comment: up to 1 yr ago drank 2-3 drinks on occasion but sporadic    Drug use: No     Review of Systems   Constitutional: Negative.    HENT: Negative.     Eyes: Negative.    Respiratory:  Positive for cough.    Cardiovascular: Negative.    Gastrointestinal:  Positive for abdominal pain.   Genitourinary:  Negative for dysuria, urgency and vaginal discharge.   Skin: Negative.    Neurological: Negative.    Psychiatric/Behavioral: Negative.     All other systems reviewed and are negative.      Physical Exam     Initial Vitals [23 1741]   BP Pulse Resp Temp SpO2   123/64 83 20 98.6 °F (37 °C) (!) 94 %      MAP       --         Physical Exam    Nursing note and vitals reviewed.  Constitutional: Vital signs are normal. She appears well-developed and well-nourished. She is cooperative.   HENT:   Head: Normocephalic and atraumatic.   Eyes: Conjunctivae, EOM and lids  are normal. Pupils are equal, round, and reactive to light.   Neck: Trachea normal and phonation normal. Neck supple. No JVD present.   Normal range of motion.   Full passive range of motion without pain.     Cardiovascular:  Normal rate, regular rhythm, S1 normal, S2 normal, normal heart sounds, intact distal pulses and normal pulses.           Pulmonary/Chest: Effort normal and breath sounds normal.   Abdominal: Abdomen is soft and flat. Bowel sounds are normal.   Musculoskeletal:         General: Normal range of motion.      Cervical back: Full passive range of motion without pain, normal range of motion and neck supple.     Neurological: She is alert and oriented to person, place, and time. She has normal strength.   Skin: Skin is intact. Capillary refill takes less than 2 seconds.   (+) right arm PICC line skin site appears clear dry & intact         ED Course   Procedures  Labs Reviewed   COMPREHENSIVE METABOLIC PANEL - Abnormal; Notable for the following components:       Result Value    Chloride 111 (*)     CO2 20 (*)     Glucose 61 (*)     BUN 43 (*)     Creatinine 2.8 (*)     eGFR 19 (*)     All other components within normal limits   CBC W/ AUTO DIFFERENTIAL - Abnormal; Notable for the following components:    Hemoglobin 9.2 (*)     Hematocrit 29.6 (*)     MCV 60 (*)     MCH 18.5 (*)     MCHC 31.1 (*)     RDW 30.8 (*)     Immature Granulocytes 0.8 (*)     Lymph # 0.7 (*)     nRBC 1 (*)     Lymph % 17.4 (*)     All other components within normal limits    Narrative:     Recoll. [id number] by BABAR at 09/03/2023 18:50, reason: clot   TROPONIN I - Abnormal; Notable for the following components:    Troponin I 1.402 (*)     All other components within normal limits   URINALYSIS, REFLEX TO URINE CULTURE - Abnormal; Notable for the following components:    Glucose, UA Trace (*)     Occult Blood UA Trace (*)     All other components within normal limits    Narrative:     Specimen Source->Urine   TROPONIN I -  Abnormal; Notable for the following components:    Troponin I 1.444 (*)     All other components within normal limits   CK   B-TYPE NATRIURETIC PEPTIDE     EKG Readings: (Independently Interpreted)   No STEMI  Normal sinus rhythm  No ectopy  Normal conduction  Normal ST segments  Normal T-wave  Normal axis  Heart rate in the 70s       Imaging Results              CT Abdomen Pelvis  Without Contrast (Final result)  Result time 09/03/23 21:10:38      Final result by Sunita Ortiz MD (09/03/23 21:10:38)                   Impression:      No acute findings.      Electronically signed by: Sunita Ortiz  Date:    09/03/2023  Time:    21:10               Narrative:    EXAMINATION:  CT ABDOMEN PELVIS WITHOUT CONTRAST    CLINICAL HISTORY:  Epigastric pain;    TECHNIQUE:  Low dose axial images, sagittal and coronal reformations were obtained from the lung bases to the pubic symphysis.  Oral contrast was not administered.    COMPARISON:  06/20/2023    FINDINGS:  Moderate cardiomegaly.  Trace pericardial effusion.    Unremarkable liver.  Cholecystectomy.  No intrahepatic or extrahepatic biliary ductal dilatation.    Unremarkable spleen and pancreas.    Unchanged 4 cm right adrenal mass.  At unremarkable left adrenal gland.    Unremarkable left adrenal gland.    Unremarkable kidneys and ureters.    Unremarkable bladder.  Hysterectomy.    Colonic diverticulosis, without diverticulitis.    No small bowel obstruction.    No free air, free fluid, or lymphadenopathy.    Mild atherosclerosis of the abdominal aorta and its branches.    Bones are intact.                                       X-Ray Chest 1 View (Final result)  Result time 09/03/23 20:59:56      Final result by Sunita Ortiz MD (09/03/23 20:59:56)                   Impression:      As above.      Electronically signed by: Sunita Ortiz  Date:    09/03/2023  Time:    20:59               Narrative:    EXAMINATION:  XR CHEST 1 VIEW    CLINICAL  HISTORY:  History of congestive heart failure;    TECHNIQUE:  Single frontal view of the chest was performed.    COMPARISON:  06/28/2023    FINDINGS:  Enlarged cardiac silhouette.  No focal consolidation, pleural effusion, or pneumothorax.  Left chest wall AICD.  Right upper extremity PICC line with tip projecting over the SVC.                                       Medications   aluminum-magnesium hydroxide-simethicone 200-200-20 mg/5 mL suspension 30 mL (30 mLs Oral Given 9/3/23 2138)     And   LIDOcaine HCl 2% oral solution 15 mL (15 mLs Oral Given 9/3/23 2137)     Medical Decision Making  58-year-old female presents with chronic cough & abdominal pain  Patient has not had the opportunity to follow-up with her PCP  Chronic cough with Dobutrex drip, end-stage congestive heart failure  Patient also with left-sided abdominal pain almost daily for months    Differential includes CHF, chronic cough, medication side-effect  Also includes enteritis, gastroenteritis, constipation, acute abdomen    Problems Addressed:  Chronic abdominal pain: complicated acute illness or injury  Chronic cough: complicated acute illness or injury  Nausea: self-limited or minor problem  Stress: self-limited or minor problem    Amount and/or Complexity of Data Reviewed  External Data Reviewed: labs, radiology, ECG and notes.  Labs: ordered. Decision-making details documented in ED Course.  Radiology: ordered and independent interpretation performed.  ECG/medicine tests: ordered and independent interpretation performed.    ED Management & Risk of Complications, Morbidity, Mortality:  Lab work within normal limits, patient has chronically elevated troponin  As a precaution 2 troponins were done that were almost identical  Patient denies any chest pain, has chronically elevated troponins  Rest of lab work within normal limits with stable chest x-ray this evening  (-) CT of the abdomen, patient feels much better after GI cocktail in the  ER    Patient will follow-up with PCP, add Carafate to her Protonix tonight  I will also prescribe Tessalon Perles for chronic cough going forward   Patient is under lot of stress, is on palliative care for her CHF as well  States she will follow-up with Dr. Ann as well as her cardiologist  Return with any concerns, feels almost 100% better on ER discharge                           Clinical Impression:   Final diagnoses:  [R11.0] Nausea  [R10.9, G89.29] Chronic abdominal pain (Primary)  [R05.3] Chronic cough        ED Disposition Condition    Discharge Stable          ED Prescriptions       Medication Sig Dispense Start Date End Date Auth. Provider    benzonatate (TESSALON) 100 MG capsule Take 2 capsules (200 mg total) by mouth 3 (three) times daily as needed for Cough. 20 capsule 9/3/2023 9/13/2023 Adriano Low MD    sucralfate (CARAFATE) 1 gram tablet Take 1 tablet (1 g total) by mouth 4 (four) times daily. 120 tablet 9/3/2023 10/3/2023 Adriano Low MD          Follow-up Information       Follow up With Specialties Details Why Contact Info    Cristobal Ann MD Family Medicine Go in 2 days  [address] [phone]  [phone]               Adriano Low MD  09/03/23 1183

## 2023-09-06 ENCOUNTER — INFUSION (OUTPATIENT)
Dept: INFUSION THERAPY | Facility: HOSPITAL | Age: 58
End: 2023-09-06
Attending: INTERNAL MEDICINE
Payer: MEDICAID

## 2023-09-06 VITALS
TEMPERATURE: 98 F | SYSTOLIC BLOOD PRESSURE: 141 MMHG | RESPIRATION RATE: 20 BRPM | DIASTOLIC BLOOD PRESSURE: 76 MMHG | HEART RATE: 100 BPM

## 2023-09-06 DIAGNOSIS — I42.9 CARDIOMYOPATHY, UNSPECIFIED TYPE: Primary | ICD-10-CM

## 2023-09-06 LAB
ALBUMIN SERPL BCP-MCNC: 4 G/DL (ref 3.5–5.2)
ALP SERPL-CCNC: 57 U/L (ref 55–135)
ALT SERPL W/O P-5'-P-CCNC: 13 U/L (ref 10–44)
ANION GAP SERPL CALC-SCNC: 12 MMOL/L (ref 8–16)
ANISOCYTOSIS BLD QL SMEAR: ABNORMAL
AST SERPL-CCNC: 21 U/L (ref 10–40)
BASOPHILS # BLD AUTO: 0.01 K/UL (ref 0–0.2)
BASOPHILS NFR BLD: 0.3 % (ref 0–1.9)
BILIRUB SERPL-MCNC: 0.9 MG/DL (ref 0.1–1)
BNP SERPL-MCNC: 181 PG/ML (ref 0–99)
BUN SERPL-MCNC: 47 MG/DL (ref 6–20)
CALCIUM SERPL-MCNC: 8.9 MG/DL (ref 8.7–10.5)
CHLORIDE SERPL-SCNC: 110 MMOL/L (ref 95–110)
CO2 SERPL-SCNC: 20 MMOL/L (ref 23–29)
CREAT SERPL-MCNC: 3.2 MG/DL (ref 0.5–1.4)
DIFFERENTIAL METHOD: ABNORMAL
EOSINOPHIL # BLD AUTO: 0.1 K/UL (ref 0–0.5)
EOSINOPHIL NFR BLD: 2.3 % (ref 0–8)
ERYTHROCYTE [DISTWIDTH] IN BLOOD BY AUTOMATED COUNT: 30.5 % (ref 11.5–14.5)
EST. GFR  (NO RACE VARIABLE): 16 ML/MIN/1.73 M^2
GLUCOSE SERPL-MCNC: 156 MG/DL (ref 70–110)
HCT VFR BLD AUTO: 31.6 % (ref 37–48.5)
HGB BLD-MCNC: 9.3 G/DL (ref 12–16)
HYPOCHROMIA BLD QL SMEAR: ABNORMAL
IMM GRANULOCYTES # BLD AUTO: 0.02 K/UL (ref 0–0.04)
IMM GRANULOCYTES NFR BLD AUTO: 0.5 % (ref 0–0.5)
LYMPHOCYTES # BLD AUTO: 0.6 K/UL (ref 1–4.8)
LYMPHOCYTES NFR BLD: 15.9 % (ref 18–48)
MAGNESIUM SERPL-MCNC: 2.4 MG/DL (ref 1.6–2.6)
MCH RBC QN AUTO: 17.9 PG (ref 27–31)
MCHC RBC AUTO-ENTMCNC: 29.4 G/DL (ref 32–36)
MCV RBC AUTO: 61 FL (ref 82–98)
MONOCYTES # BLD AUTO: 0.4 K/UL (ref 0.3–1)
MONOCYTES NFR BLD: 10.4 % (ref 4–15)
NEUTROPHILS # BLD AUTO: 2.8 K/UL (ref 1.8–7.7)
NEUTROPHILS NFR BLD: 70.6 % (ref 38–73)
NRBC BLD-RTO: 1 /100 WBC
OVALOCYTES BLD QL SMEAR: ABNORMAL
PHOSPHATE SERPL-MCNC: 4.7 MG/DL (ref 2.7–4.5)
PLATELET # BLD AUTO: 275 K/UL (ref 150–450)
PLATELET BLD QL SMEAR: ABNORMAL
PMV BLD AUTO: ABNORMAL FL (ref 9.2–12.9)
POIKILOCYTOSIS BLD QL SMEAR: ABNORMAL
POLYCHROMASIA BLD QL SMEAR: ABNORMAL
POTASSIUM SERPL-SCNC: 4.2 MMOL/L (ref 3.5–5.1)
PROT SERPL-MCNC: 7.2 G/DL (ref 6–8.4)
RBC # BLD AUTO: 5.2 M/UL (ref 4–5.4)
SODIUM SERPL-SCNC: 142 MMOL/L (ref 136–145)
STOMATOCYTES BLD QL SMEAR: PRESENT
TARGETS BLD QL SMEAR: ABNORMAL
WBC # BLD AUTO: 3.95 K/UL (ref 3.9–12.7)

## 2023-09-06 PROCEDURE — 36415 COLL VENOUS BLD VENIPUNCTURE: CPT | Performed by: INTERNAL MEDICINE

## 2023-09-06 PROCEDURE — 83880 ASSAY OF NATRIURETIC PEPTIDE: CPT | Performed by: INTERNAL MEDICINE

## 2023-09-06 PROCEDURE — 36591 DRAW BLOOD OFF VENOUS DEVICE: CPT

## 2023-09-06 PROCEDURE — 84100 ASSAY OF PHOSPHORUS: CPT | Performed by: INTERNAL MEDICINE

## 2023-09-06 PROCEDURE — 83735 ASSAY OF MAGNESIUM: CPT | Performed by: INTERNAL MEDICINE

## 2023-09-06 PROCEDURE — 85025 COMPLETE CBC W/AUTO DIFF WBC: CPT | Performed by: INTERNAL MEDICINE

## 2023-09-06 PROCEDURE — 80053 COMPREHEN METABOLIC PANEL: CPT | Performed by: INTERNAL MEDICINE

## 2023-09-06 NOTE — PROGRESS NOTES
1240  Right upper arm PICC line dressing change done using sterile technique.  Tolerated well  bio patch, tegaderm and stat lock device reapplied using sterile technique.

## 2023-09-07 ENCOUNTER — PATIENT OUTREACH (OUTPATIENT)
Dept: EMERGENCY MEDICINE | Facility: HOSPITAL | Age: 58
End: 2023-09-07
Payer: MEDICAID

## 2023-09-07 DIAGNOSIS — R11.2 NAUSEA AND VOMITING, UNSPECIFIED VOMITING TYPE: ICD-10-CM

## 2023-09-07 NOTE — PROGRESS NOTES
Pt is unreachable. Encounter will be closed, and pt should be contacted if/when he/she comes back to the ER again for a F/U call.    Beulah Figueroa  ED Navigator- [facility]  [phone]

## 2023-09-08 ENCOUNTER — TELEPHONE (OUTPATIENT)
Dept: PALLIATIVE MEDICINE | Facility: CLINIC | Age: 58
End: 2023-09-08
Payer: MEDICAID

## 2023-09-08 RX ORDER — PROMETHAZINE HYDROCHLORIDE 25 MG/1
25 SUPPOSITORY RECTAL EVERY 6 HOURS PRN
Qty: 10 SUPPOSITORY | Refills: 5 | Status: SHIPPED | OUTPATIENT
Start: 2023-09-08 | End: 2023-10-27

## 2023-09-08 NOTE — TELEPHONE ENCOUNTER
Dr. Reza Out    LOV 08/11/2023    Patient requesting refill for promethazine (PHENERGAN) 25 MG suppository    Requested Prescriptions     Pending Prescriptions Disp Refills    promethazine (PHENERGAN) 25 MG suppository 10 suppository 5     Sig: Place 1 suppository (25 mg total) rectally every 6 (six) hours as needed for Nausea.       Medication pended/pharmacy confirmed, please advise.

## 2023-09-11 ENCOUNTER — INFUSION (OUTPATIENT)
Dept: INFUSION THERAPY | Facility: HOSPITAL | Age: 58
End: 2023-09-11
Attending: INTERNAL MEDICINE
Payer: MEDICAID

## 2023-09-11 ENCOUNTER — TELEPHONE (OUTPATIENT)
Dept: PALLIATIVE MEDICINE | Facility: CLINIC | Age: 58
End: 2023-09-11
Payer: MEDICAID

## 2023-09-11 VITALS
RESPIRATION RATE: 20 BRPM | SYSTOLIC BLOOD PRESSURE: 116 MMHG | DIASTOLIC BLOOD PRESSURE: 69 MMHG | TEMPERATURE: 97 F | HEART RATE: 81 BPM

## 2023-09-11 DIAGNOSIS — N18.4 CHRONIC KIDNEY DISEASE, STAGE IV (SEVERE): Primary | ICD-10-CM

## 2023-09-11 DIAGNOSIS — I11.0 HYPERTENSIVE HEART DISEASE WITH COMBINED SYSTOLIC AND DIASTOLIC CONGESTIVE HEART FAILURE, UNSPECIFIED HF CHRONICITY: ICD-10-CM

## 2023-09-11 DIAGNOSIS — I50.42 CHRONIC COMBINED SYSTOLIC AND DIASTOLIC HEART FAILURE: ICD-10-CM

## 2023-09-11 DIAGNOSIS — E11.00 TYPE 2 DIABETES MELLITUS WITH HYPEROSMOLARITY WITHOUT COMA, UNSPECIFIED WHETHER LONG TERM INSULIN USE: ICD-10-CM

## 2023-09-11 DIAGNOSIS — I50.40 HYPERTENSIVE HEART DISEASE WITH COMBINED SYSTOLIC AND DIASTOLIC CONGESTIVE HEART FAILURE, UNSPECIFIED HF CHRONICITY: ICD-10-CM

## 2023-09-11 LAB
ALBUMIN SERPL BCP-MCNC: 3.7 G/DL (ref 3.5–5.2)
ALP SERPL-CCNC: 55 U/L (ref 55–135)
ALT SERPL W/O P-5'-P-CCNC: 14 U/L (ref 10–44)
ANION GAP SERPL CALC-SCNC: 9 MMOL/L (ref 8–16)
ANISOCYTOSIS BLD QL SMEAR: ABNORMAL
AST SERPL-CCNC: 19 U/L (ref 10–40)
BASOPHILS # BLD AUTO: 0.01 K/UL (ref 0–0.2)
BASOPHILS NFR BLD: 0.2 % (ref 0–1.9)
BILIRUB SERPL-MCNC: 0.9 MG/DL (ref 0.1–1)
BNP SERPL-MCNC: 352 PG/ML (ref 0–99)
BUN SERPL-MCNC: 40 MG/DL (ref 6–20)
BURR CELLS BLD QL SMEAR: ABNORMAL
CALCIUM SERPL-MCNC: 8.9 MG/DL (ref 8.7–10.5)
CHLORIDE SERPL-SCNC: 111 MMOL/L (ref 95–110)
CO2 SERPL-SCNC: 23 MMOL/L (ref 23–29)
CREAT SERPL-MCNC: 2.2 MG/DL (ref 0.5–1.4)
DACRYOCYTES BLD QL SMEAR: ABNORMAL
DIFFERENTIAL METHOD: ABNORMAL
EOSINOPHIL # BLD AUTO: 0.1 K/UL (ref 0–0.5)
EOSINOPHIL NFR BLD: 1.8 % (ref 0–8)
ERYTHROCYTE [DISTWIDTH] IN BLOOD BY AUTOMATED COUNT: 30.2 % (ref 11.5–14.5)
EST. GFR  (NO RACE VARIABLE): 25 ML/MIN/1.73 M^2
GLUCOSE SERPL-MCNC: 127 MG/DL (ref 70–110)
HCT VFR BLD AUTO: 31.4 % (ref 37–48.5)
HGB BLD-MCNC: 9.3 G/DL (ref 12–16)
IMM GRANULOCYTES # BLD AUTO: 0.03 K/UL (ref 0–0.04)
IMM GRANULOCYTES NFR BLD AUTO: 0.7 % (ref 0–0.5)
LYMPHOCYTES # BLD AUTO: 0.8 K/UL (ref 1–4.8)
LYMPHOCYTES NFR BLD: 17.3 % (ref 18–48)
MAGNESIUM SERPL-MCNC: 2.2 MG/DL (ref 1.6–2.6)
MCH RBC QN AUTO: 18.1 PG (ref 27–31)
MCHC RBC AUTO-ENTMCNC: 29.6 G/DL (ref 32–36)
MCV RBC AUTO: 61 FL (ref 82–98)
MONOCYTES # BLD AUTO: 0.4 K/UL (ref 0.3–1)
MONOCYTES NFR BLD: 8.4 % (ref 4–15)
NEUTROPHILS # BLD AUTO: 3.2 K/UL (ref 1.8–7.7)
NEUTROPHILS NFR BLD: 71.6 % (ref 38–73)
NRBC BLD-RTO: 0 /100 WBC
OVALOCYTES BLD QL SMEAR: ABNORMAL
PHOSPHATE SERPL-MCNC: 3.3 MG/DL (ref 2.7–4.5)
PLATELET # BLD AUTO: 175 K/UL (ref 150–450)
PLATELET BLD QL SMEAR: ABNORMAL
PMV BLD AUTO: ABNORMAL FL (ref 9.2–12.9)
POIKILOCYTOSIS BLD QL SMEAR: ABNORMAL
POLYCHROMASIA BLD QL SMEAR: ABNORMAL
POTASSIUM SERPL-SCNC: 4.5 MMOL/L (ref 3.5–5.1)
PROT SERPL-MCNC: 6.7 G/DL (ref 6–8.4)
RBC # BLD AUTO: 5.13 M/UL (ref 4–5.4)
SCHISTOCYTES BLD QL SMEAR: ABNORMAL
SCHISTOCYTES BLD QL SMEAR: PRESENT
SODIUM SERPL-SCNC: 143 MMOL/L (ref 136–145)
SPHEROCYTES BLD QL SMEAR: ABNORMAL
STOMATOCYTES BLD QL SMEAR: PRESENT
TARGETS BLD QL SMEAR: ABNORMAL
WBC # BLD AUTO: 4.5 K/UL (ref 3.9–12.7)

## 2023-09-11 PROCEDURE — 85025 COMPLETE CBC W/AUTO DIFF WBC: CPT | Performed by: INTERNAL MEDICINE

## 2023-09-11 PROCEDURE — 36415 COLL VENOUS BLD VENIPUNCTURE: CPT | Performed by: INTERNAL MEDICINE

## 2023-09-11 PROCEDURE — 83735 ASSAY OF MAGNESIUM: CPT | Performed by: INTERNAL MEDICINE

## 2023-09-11 PROCEDURE — 83880 ASSAY OF NATRIURETIC PEPTIDE: CPT | Performed by: INTERNAL MEDICINE

## 2023-09-11 PROCEDURE — 84100 ASSAY OF PHOSPHORUS: CPT | Performed by: INTERNAL MEDICINE

## 2023-09-11 PROCEDURE — 36591 DRAW BLOOD OFF VENOUS DEVICE: CPT

## 2023-09-11 PROCEDURE — 80053 COMPREHEN METABOLIC PANEL: CPT | Performed by: INTERNAL MEDICINE

## 2023-09-11 NOTE — PROGRESS NOTES
1410  Right upper arm PICC line dressing change done using sterile technique.  No signs of infection noted.  Stat lock device, tegaderm and biopatch reapplied.  Tolerated well  dc to home in stable condition.

## 2023-09-11 NOTE — TELEPHONE ENCOUNTER
Patient stated that she will contact the clinic later on to reschedule her appt because she is not feeling too good. Provider was informed, and patient appointment has been canceled.

## 2023-09-12 DIAGNOSIS — R52 PAIN: ICD-10-CM

## 2023-09-12 DIAGNOSIS — R06.00 DYSPNEA, UNSPECIFIED TYPE: ICD-10-CM

## 2023-09-12 RX ORDER — HYDROCODONE BITARTRATE AND ACETAMINOPHEN 7.5; 325 MG/1; MG/1
1.5 TABLET ORAL EVERY 8 HOURS PRN
Qty: 90 TABLET | Refills: 0 | Status: SHIPPED | OUTPATIENT
Start: 2023-09-12 | End: 2023-09-14 | Stop reason: SDUPTHER

## 2023-09-14 DIAGNOSIS — R52 PAIN: ICD-10-CM

## 2023-09-14 DIAGNOSIS — R06.00 DYSPNEA, UNSPECIFIED TYPE: ICD-10-CM

## 2023-09-14 RX ORDER — HYDROCODONE BITARTRATE AND ACETAMINOPHEN 7.5; 325 MG/1; MG/1
1.5 TABLET ORAL EVERY 8 HOURS PRN
Qty: 90 TABLET | Refills: 0 | Status: SHIPPED | OUTPATIENT
Start: 2023-09-14 | End: 2023-10-05 | Stop reason: ALTCHOICE

## 2023-09-18 ENCOUNTER — INFUSION (OUTPATIENT)
Dept: INFUSION THERAPY | Facility: HOSPITAL | Age: 58
End: 2023-09-18
Attending: INTERNAL MEDICINE
Payer: MEDICAID

## 2023-09-18 VITALS
SYSTOLIC BLOOD PRESSURE: 137 MMHG | DIASTOLIC BLOOD PRESSURE: 85 MMHG | HEART RATE: 80 BPM | TEMPERATURE: 97 F | RESPIRATION RATE: 20 BRPM

## 2023-09-18 DIAGNOSIS — I11.0 HYPERTENSIVE HEART DISEASE WITH CONGESTIVE HEART FAILURE, UNSPECIFIED HEART FAILURE TYPE: ICD-10-CM

## 2023-09-18 DIAGNOSIS — I50.42 CHRONIC COMBINED SYSTOLIC AND DIASTOLIC HEART FAILURE: ICD-10-CM

## 2023-09-18 DIAGNOSIS — N18.4 CHRONIC KIDNEY DISEASE, STAGE IV (SEVERE): Primary | ICD-10-CM

## 2023-09-18 DIAGNOSIS — E11.9 DIABETES MELLITUS WITHOUT COMPLICATION: ICD-10-CM

## 2023-09-18 LAB
ACANTHOCYTES BLD QL SMEAR: PRESENT
ALBUMIN SERPL BCP-MCNC: 3.9 G/DL (ref 3.5–5.2)
ALP SERPL-CCNC: 58 U/L (ref 55–135)
ALT SERPL W/O P-5'-P-CCNC: 15 U/L (ref 10–44)
ANION GAP SERPL CALC-SCNC: 9 MMOL/L (ref 8–16)
ANISOCYTOSIS BLD QL SMEAR: ABNORMAL
AST SERPL-CCNC: 19 U/L (ref 10–40)
BASOPHILS # BLD AUTO: 0.02 K/UL (ref 0–0.2)
BASOPHILS NFR BLD: 0.5 % (ref 0–1.9)
BILIRUB SERPL-MCNC: 1 MG/DL (ref 0.1–1)
BNP SERPL-MCNC: 357 PG/ML (ref 0–99)
BUN SERPL-MCNC: 36 MG/DL (ref 6–20)
BURR CELLS BLD QL SMEAR: ABNORMAL
CALCIUM SERPL-MCNC: 9 MG/DL (ref 8.7–10.5)
CHLORIDE SERPL-SCNC: 108 MMOL/L (ref 95–110)
CO2 SERPL-SCNC: 26 MMOL/L (ref 23–29)
CREAT SERPL-MCNC: 2.5 MG/DL (ref 0.5–1.4)
DACRYOCYTES BLD QL SMEAR: ABNORMAL
DIFFERENTIAL METHOD: ABNORMAL
EOSINOPHIL # BLD AUTO: 0.1 K/UL (ref 0–0.5)
EOSINOPHIL NFR BLD: 2.1 % (ref 0–8)
ERYTHROCYTE [DISTWIDTH] IN BLOOD BY AUTOMATED COUNT: 29.4 % (ref 11.5–14.5)
EST. GFR  (NO RACE VARIABLE): 22 ML/MIN/1.73 M^2
GLUCOSE SERPL-MCNC: 96 MG/DL (ref 70–110)
HCT VFR BLD AUTO: 32.6 % (ref 37–48.5)
HGB BLD-MCNC: 9.4 G/DL (ref 12–16)
IMM GRANULOCYTES # BLD AUTO: 0.01 K/UL (ref 0–0.04)
IMM GRANULOCYTES NFR BLD AUTO: 0.3 % (ref 0–0.5)
LYMPHOCYTES # BLD AUTO: 0.7 K/UL (ref 1–4.8)
LYMPHOCYTES NFR BLD: 19.1 % (ref 18–48)
MAGNESIUM SERPL-MCNC: 2.2 MG/DL (ref 1.6–2.6)
MCH RBC QN AUTO: 17.7 PG (ref 27–31)
MCHC RBC AUTO-ENTMCNC: 28.8 G/DL (ref 32–36)
MCV RBC AUTO: 61 FL (ref 82–98)
MONOCYTES # BLD AUTO: 0.4 K/UL (ref 0.3–1)
MONOCYTES NFR BLD: 9.8 % (ref 4–15)
NEUTROPHILS # BLD AUTO: 2.6 K/UL (ref 1.8–7.7)
NEUTROPHILS NFR BLD: 68.2 % (ref 38–73)
NRBC BLD-RTO: 0 /100 WBC
OVALOCYTES BLD QL SMEAR: ABNORMAL
PHOSPHATE SERPL-MCNC: 3.5 MG/DL (ref 2.7–4.5)
PLATELET # BLD AUTO: 190 K/UL (ref 150–450)
PLATELET BLD QL SMEAR: ABNORMAL
PMV BLD AUTO: ABNORMAL FL (ref 9.2–12.9)
POIKILOCYTOSIS BLD QL SMEAR: ABNORMAL
POLYCHROMASIA BLD QL SMEAR: ABNORMAL
POTASSIUM SERPL-SCNC: 5 MMOL/L (ref 3.5–5.1)
PROT SERPL-MCNC: 7 G/DL (ref 6–8.4)
RBC # BLD AUTO: 5.31 M/UL (ref 4–5.4)
SODIUM SERPL-SCNC: 143 MMOL/L (ref 136–145)
TARGETS BLD QL SMEAR: ABNORMAL
WBC # BLD AUTO: 3.87 K/UL (ref 3.9–12.7)

## 2023-09-18 PROCEDURE — 83735 ASSAY OF MAGNESIUM: CPT | Performed by: INTERNAL MEDICINE

## 2023-09-18 PROCEDURE — 36415 COLL VENOUS BLD VENIPUNCTURE: CPT | Performed by: INTERNAL MEDICINE

## 2023-09-18 PROCEDURE — 84100 ASSAY OF PHOSPHORUS: CPT | Performed by: INTERNAL MEDICINE

## 2023-09-18 PROCEDURE — 83880 ASSAY OF NATRIURETIC PEPTIDE: CPT | Performed by: INTERNAL MEDICINE

## 2023-09-18 PROCEDURE — 36591 DRAW BLOOD OFF VENOUS DEVICE: CPT

## 2023-09-18 PROCEDURE — 80053 COMPREHEN METABOLIC PANEL: CPT | Performed by: INTERNAL MEDICINE

## 2023-09-18 PROCEDURE — 85025 COMPLETE CBC W/AUTO DIFF WBC: CPT | Performed by: INTERNAL MEDICINE

## 2023-09-18 NOTE — PROGRESS NOTES
1400  Right upper arm PICC line dressing change done using sterile technique.  No signs of infection noted.  Bio patch, tegaderm dressing and stat lock device reapplied.  Tolerated well  Blood drawn from PICC line  Tolerated well . Flushed with saline.  Pt will maintain at home

## 2023-09-25 ENCOUNTER — INFUSION (OUTPATIENT)
Dept: INFUSION THERAPY | Facility: HOSPITAL | Age: 58
End: 2023-09-25
Attending: STUDENT IN AN ORGANIZED HEALTH CARE EDUCATION/TRAINING PROGRAM
Payer: MEDICAID

## 2023-09-25 VITALS — SYSTOLIC BLOOD PRESSURE: 111 MMHG | RESPIRATION RATE: 18 BRPM | DIASTOLIC BLOOD PRESSURE: 67 MMHG | HEART RATE: 86 BPM

## 2023-09-25 DIAGNOSIS — N18.4 CHRONIC KIDNEY DISEASE, STAGE IV (SEVERE): Primary | ICD-10-CM

## 2023-09-25 DIAGNOSIS — E11.9 DIABETES MELLITUS WITHOUT COMPLICATION: ICD-10-CM

## 2023-09-25 DIAGNOSIS — I50.42 CHRONIC COMBINED SYSTOLIC AND DIASTOLIC HEART FAILURE: ICD-10-CM

## 2023-09-25 DIAGNOSIS — I11.0 HYPERTENSIVE HEART DISEASE WITH CONGESTIVE HEART FAILURE, UNSPECIFIED HEART FAILURE TYPE: ICD-10-CM

## 2023-09-25 PROBLEM — J96.11 CHRONIC RESPIRATORY FAILURE WITH HYPOXIA AND HYPERCAPNIA: Chronic | Status: RESOLVED | Noted: 2021-03-16 | Resolved: 2023-09-25

## 2023-09-25 PROBLEM — J96.12 CHRONIC RESPIRATORY FAILURE WITH HYPOXIA AND HYPERCAPNIA: Chronic | Status: RESOLVED | Noted: 2021-03-16 | Resolved: 2023-09-25

## 2023-09-25 LAB
ALBUMIN SERPL BCP-MCNC: 3.6 G/DL (ref 3.5–5.2)
ALP SERPL-CCNC: 53 U/L (ref 55–135)
ALT SERPL W/O P-5'-P-CCNC: 15 U/L (ref 10–44)
ANION GAP SERPL CALC-SCNC: 9 MMOL/L (ref 8–16)
ANISOCYTOSIS BLD QL SMEAR: ABNORMAL
AST SERPL-CCNC: 21 U/L (ref 10–40)
BASOPHILS # BLD AUTO: 0.02 K/UL (ref 0–0.2)
BASOPHILS NFR BLD: 0.6 % (ref 0–1.9)
BILIRUB SERPL-MCNC: 1.3 MG/DL (ref 0.1–1)
BNP SERPL-MCNC: 530 PG/ML (ref 0–99)
BUN SERPL-MCNC: 31 MG/DL (ref 6–20)
CALCIUM SERPL-MCNC: 8.6 MG/DL (ref 8.7–10.5)
CHLORIDE SERPL-SCNC: 111 MMOL/L (ref 95–110)
CO2 SERPL-SCNC: 24 MMOL/L (ref 23–29)
CREAT SERPL-MCNC: 2.1 MG/DL (ref 0.5–1.4)
DACRYOCYTES BLD QL SMEAR: ABNORMAL
DIFFERENTIAL METHOD: ABNORMAL
EOSINOPHIL # BLD AUTO: 0.1 K/UL (ref 0–0.5)
EOSINOPHIL NFR BLD: 2.2 % (ref 0–8)
ERYTHROCYTE [DISTWIDTH] IN BLOOD BY AUTOMATED COUNT: 28 % (ref 11.5–14.5)
EST. GFR  (NO RACE VARIABLE): 27 ML/MIN/1.73 M^2
GLUCOSE SERPL-MCNC: 84 MG/DL (ref 70–110)
HCT VFR BLD AUTO: 29.4 % (ref 37–48.5)
HGB BLD-MCNC: 8.6 G/DL (ref 12–16)
HYPOCHROMIA BLD QL SMEAR: ABNORMAL
IMM GRANULOCYTES # BLD AUTO: 0.04 K/UL (ref 0–0.04)
IMM GRANULOCYTES NFR BLD AUTO: 1.1 % (ref 0–0.5)
LYMPHOCYTES # BLD AUTO: 0.7 K/UL (ref 1–4.8)
LYMPHOCYTES NFR BLD: 18.2 % (ref 18–48)
MAGNESIUM SERPL-MCNC: 1.9 MG/DL (ref 1.6–2.6)
MCH RBC QN AUTO: 17.7 PG (ref 27–31)
MCHC RBC AUTO-ENTMCNC: 29.3 G/DL (ref 32–36)
MCV RBC AUTO: 61 FL (ref 82–98)
MONOCYTES # BLD AUTO: 0.3 K/UL (ref 0.3–1)
MONOCYTES NFR BLD: 9.4 % (ref 4–15)
NEUTROPHILS # BLD AUTO: 2.5 K/UL (ref 1.8–7.7)
NEUTROPHILS NFR BLD: 68.5 % (ref 38–73)
NRBC BLD-RTO: 0 /100 WBC
OVALOCYTES BLD QL SMEAR: ABNORMAL
PHOSPHATE SERPL-MCNC: 3.3 MG/DL (ref 2.7–4.5)
PLATELET # BLD AUTO: 148 K/UL (ref 150–450)
PLATELET BLD QL SMEAR: ABNORMAL
PMV BLD AUTO: ABNORMAL FL (ref 9.2–12.9)
POIKILOCYTOSIS BLD QL SMEAR: ABNORMAL
POLYCHROMASIA BLD QL SMEAR: ABNORMAL
POTASSIUM SERPL-SCNC: 4.3 MMOL/L (ref 3.5–5.1)
PROT SERPL-MCNC: 6.5 G/DL (ref 6–8.4)
RBC # BLD AUTO: 4.85 M/UL (ref 4–5.4)
SCHISTOCYTES BLD QL SMEAR: ABNORMAL
SCHISTOCYTES BLD QL SMEAR: PRESENT
SODIUM SERPL-SCNC: 144 MMOL/L (ref 136–145)
TARGETS BLD QL SMEAR: ABNORMAL
WBC # BLD AUTO: 3.62 K/UL (ref 3.9–12.7)

## 2023-09-25 PROCEDURE — 36591 DRAW BLOOD OFF VENOUS DEVICE: CPT | Mod: 59

## 2023-09-25 PROCEDURE — 36415 COLL VENOUS BLD VENIPUNCTURE: CPT | Performed by: INTERNAL MEDICINE

## 2023-09-25 PROCEDURE — 83880 ASSAY OF NATRIURETIC PEPTIDE: CPT | Performed by: INTERNAL MEDICINE

## 2023-09-25 PROCEDURE — 85025 COMPLETE CBC W/AUTO DIFF WBC: CPT | Performed by: INTERNAL MEDICINE

## 2023-09-25 PROCEDURE — 84100 ASSAY OF PHOSPHORUS: CPT | Performed by: INTERNAL MEDICINE

## 2023-09-25 PROCEDURE — 80053 COMPREHEN METABOLIC PANEL: CPT | Performed by: INTERNAL MEDICINE

## 2023-09-25 PROCEDURE — 83735 ASSAY OF MAGNESIUM: CPT | Performed by: INTERNAL MEDICINE

## 2023-10-02 ENCOUNTER — INFUSION (OUTPATIENT)
Dept: INFUSION THERAPY | Facility: HOSPITAL | Age: 58
End: 2023-10-02
Attending: INTERNAL MEDICINE
Payer: MEDICAID

## 2023-10-02 VITALS
HEART RATE: 79 BPM | DIASTOLIC BLOOD PRESSURE: 79 MMHG | TEMPERATURE: 97 F | RESPIRATION RATE: 18 BRPM | SYSTOLIC BLOOD PRESSURE: 148 MMHG

## 2023-10-02 DIAGNOSIS — N18.4 CHRONIC KIDNEY DISEASE, STAGE IV (SEVERE): Primary | ICD-10-CM

## 2023-10-02 DIAGNOSIS — I50.42 CHRONIC COMBINED SYSTOLIC AND DIASTOLIC HEART FAILURE: ICD-10-CM

## 2023-10-02 DIAGNOSIS — E11.9 DIABETES MELLITUS WITHOUT COMPLICATION: ICD-10-CM

## 2023-10-02 DIAGNOSIS — I11.0 HYPERTENSIVE HEART DISEASE WITH CONGESTIVE HEART FAILURE, UNSPECIFIED HEART FAILURE TYPE: ICD-10-CM

## 2023-10-02 DIAGNOSIS — I11.0 HYPERTENSIVE HEART DISEASE WITH COMBINED SYSTOLIC AND DIASTOLIC CONGESTIVE HEART FAILURE, UNSPECIFIED HF CHRONICITY: ICD-10-CM

## 2023-10-02 DIAGNOSIS — I50.40 HYPERTENSIVE HEART DISEASE WITH COMBINED SYSTOLIC AND DIASTOLIC CONGESTIVE HEART FAILURE, UNSPECIFIED HF CHRONICITY: ICD-10-CM

## 2023-10-02 DIAGNOSIS — I42.9 CARDIOMYOPATHY, UNSPECIFIED TYPE: ICD-10-CM

## 2023-10-02 PROBLEM — I21.4 NSTEMI (NON-ST ELEVATED MYOCARDIAL INFARCTION): Status: RESOLVED | Noted: 2020-09-03 | Resolved: 2023-10-02

## 2023-10-02 PROBLEM — N18.9 ACUTE KIDNEY INJURY SUPERIMPOSED ON CHRONIC KIDNEY DISEASE: Status: RESOLVED | Noted: 2021-10-28 | Resolved: 2023-10-02

## 2023-10-02 PROBLEM — N17.9 ACUTE KIDNEY INJURY SUPERIMPOSED ON CHRONIC KIDNEY DISEASE: Status: RESOLVED | Noted: 2021-10-28 | Resolved: 2023-10-02

## 2023-10-02 LAB
ALBUMIN SERPL BCP-MCNC: 4 G/DL (ref 3.5–5.2)
ALP SERPL-CCNC: 58 U/L (ref 55–135)
ALT SERPL W/O P-5'-P-CCNC: 17 U/L (ref 10–44)
ANION GAP SERPL CALC-SCNC: 10 MMOL/L (ref 8–16)
ANISOCYTOSIS BLD QL SMEAR: ABNORMAL
AST SERPL-CCNC: 20 U/L (ref 10–40)
BASOPHILS # BLD AUTO: 0.02 K/UL (ref 0–0.2)
BASOPHILS NFR BLD: 0.5 % (ref 0–1.9)
BILIRUB SERPL-MCNC: 1.4 MG/DL (ref 0.1–1)
BNP SERPL-MCNC: 242 PG/ML (ref 0–99)
BUN SERPL-MCNC: 43 MG/DL (ref 6–20)
CALCIUM SERPL-MCNC: 9.3 MG/DL (ref 8.7–10.5)
CHLORIDE SERPL-SCNC: 105 MMOL/L (ref 95–110)
CO2 SERPL-SCNC: 26 MMOL/L (ref 23–29)
CREAT SERPL-MCNC: 2.6 MG/DL (ref 0.5–1.4)
DACRYOCYTES BLD QL SMEAR: ABNORMAL
DIFFERENTIAL METHOD: ABNORMAL
EOSINOPHIL # BLD AUTO: 0.1 K/UL (ref 0–0.5)
EOSINOPHIL NFR BLD: 1.4 % (ref 0–8)
ERYTHROCYTE [DISTWIDTH] IN BLOOD BY AUTOMATED COUNT: 27.3 % (ref 11.5–14.5)
EST. GFR  (NO RACE VARIABLE): 21 ML/MIN/1.73 M^2
GLUCOSE SERPL-MCNC: 141 MG/DL (ref 70–110)
HCT VFR BLD AUTO: 30.6 % (ref 37–48.5)
HGB BLD-MCNC: 9.2 G/DL (ref 12–16)
HYPOCHROMIA BLD QL SMEAR: ABNORMAL
IMM GRANULOCYTES # BLD AUTO: 0.03 K/UL (ref 0–0.04)
IMM GRANULOCYTES NFR BLD AUTO: 0.7 % (ref 0–0.5)
LYMPHOCYTES # BLD AUTO: 0.5 K/UL (ref 1–4.8)
LYMPHOCYTES NFR BLD: 12.5 % (ref 18–48)
MAGNESIUM SERPL-MCNC: 2.1 MG/DL (ref 1.6–2.6)
MCH RBC QN AUTO: 17.8 PG (ref 27–31)
MCHC RBC AUTO-ENTMCNC: 30.1 G/DL (ref 32–36)
MCV RBC AUTO: 59 FL (ref 82–98)
MONOCYTES # BLD AUTO: 0.3 K/UL (ref 0.3–1)
MONOCYTES NFR BLD: 8.2 % (ref 4–15)
NEUTROPHILS # BLD AUTO: 3.2 K/UL (ref 1.8–7.7)
NEUTROPHILS NFR BLD: 76.7 % (ref 38–73)
NRBC BLD-RTO: 0 /100 WBC
OVALOCYTES BLD QL SMEAR: ABNORMAL
PHOSPHATE SERPL-MCNC: 4 MG/DL (ref 2.7–4.5)
PLATELET # BLD AUTO: 178 K/UL (ref 150–450)
PLATELET BLD QL SMEAR: ABNORMAL
PMV BLD AUTO: ABNORMAL FL (ref 9.2–12.9)
POIKILOCYTOSIS BLD QL SMEAR: ABNORMAL
POLYCHROMASIA BLD QL SMEAR: ABNORMAL
POTASSIUM SERPL-SCNC: 4.3 MMOL/L (ref 3.5–5.1)
PROT SERPL-MCNC: 7 G/DL (ref 6–8.4)
RBC # BLD AUTO: 5.16 M/UL (ref 4–5.4)
SCHISTOCYTES BLD QL SMEAR: ABNORMAL
SCHISTOCYTES BLD QL SMEAR: PRESENT
SODIUM SERPL-SCNC: 141 MMOL/L (ref 136–145)
SPHEROCYTES BLD QL SMEAR: ABNORMAL
WBC # BLD AUTO: 4.15 K/UL (ref 3.9–12.7)

## 2023-10-02 PROCEDURE — 83735 ASSAY OF MAGNESIUM: CPT | Performed by: INTERNAL MEDICINE

## 2023-10-02 PROCEDURE — 36591 DRAW BLOOD OFF VENOUS DEVICE: CPT

## 2023-10-02 PROCEDURE — 85025 COMPLETE CBC W/AUTO DIFF WBC: CPT | Performed by: INTERNAL MEDICINE

## 2023-10-02 PROCEDURE — 83880 ASSAY OF NATRIURETIC PEPTIDE: CPT | Performed by: INTERNAL MEDICINE

## 2023-10-02 PROCEDURE — 84100 ASSAY OF PHOSPHORUS: CPT | Performed by: INTERNAL MEDICINE

## 2023-10-02 PROCEDURE — 80053 COMPREHEN METABOLIC PANEL: CPT | Performed by: INTERNAL MEDICINE

## 2023-10-02 NOTE — PROGRESS NOTES
1400  Right upper arm PICC line dressing change done using sterile technique.  Tolerated well  Bio patch, stat lock device and tegaderm dressing reapplied

## 2023-10-03 ENCOUNTER — TELEPHONE (OUTPATIENT)
Dept: FAMILY MEDICINE | Facility: CLINIC | Age: 58
End: 2023-10-03
Payer: MEDICAID

## 2023-10-03 DIAGNOSIS — N18.4 CKD (CHRONIC KIDNEY DISEASE), STAGE IV: Primary | Chronic | ICD-10-CM

## 2023-10-03 DIAGNOSIS — N18.4 CKD (CHRONIC KIDNEY DISEASE) STAGE 4, GFR 15-29 ML/MIN: Primary | ICD-10-CM

## 2023-10-03 NOTE — TELEPHONE ENCOUNTER
Pt had labs done with Dr. Cortez yesterday and was instructed for  to review labs.     Please advise

## 2023-10-03 NOTE — TELEPHONE ENCOUNTER
Her labs look stable. She was seeing nephology at [facility] in the past, is she still seeing them or does she need a new referral to [facility]?

## 2023-10-03 NOTE — TELEPHONE ENCOUNTER
----- Message from Frank Cook sent at 10/3/2023  1:46 PM CDT -----  Contact: Patient  Hafsa Hawley  MRN: [id number]  : 1965  PCP: [place], Primary Doctor  Home Phone      [phone]  Work Phone      Not on file.  Mobile          [phone]  Home Phone      [phone]      MESSAGE: had labs done for Dr Cortez yesterday - was told to have Dr Reza review results & advise -- they are in the system     Call [phone]    PCP: [facility]

## 2023-10-05 ENCOUNTER — OFFICE VISIT (OUTPATIENT)
Dept: INTERNAL MEDICINE | Facility: CLINIC | Age: 58
End: 2023-10-05
Payer: MEDICAID

## 2023-10-05 ENCOUNTER — HOSPITAL ENCOUNTER (EMERGENCY)
Facility: HOSPITAL | Age: 58
Discharge: HOME | End: 2023-10-05
Attending: STUDENT IN AN ORGANIZED HEALTH CARE EDUCATION/TRAINING PROGRAM
Payer: MEDICAID

## 2023-10-05 VITALS
HEART RATE: 79 BPM | BODY MASS INDEX: 32.96 KG/M2 | OXYGEN SATURATION: 96 % | HEIGHT: 63 IN | TEMPERATURE: 98 F | RESPIRATION RATE: 18 BRPM | WEIGHT: 186 LBS | SYSTOLIC BLOOD PRESSURE: 139 MMHG | DIASTOLIC BLOOD PRESSURE: 83 MMHG

## 2023-10-05 VITALS
WEIGHT: 186.75 LBS | SYSTOLIC BLOOD PRESSURE: 130 MMHG | HEART RATE: 68 BPM | RESPIRATION RATE: 20 BRPM | BODY MASS INDEX: 33.09 KG/M2 | DIASTOLIC BLOOD PRESSURE: 78 MMHG | HEIGHT: 63 IN

## 2023-10-05 DIAGNOSIS — M10.9 GOUT, ARTHRITIS: ICD-10-CM

## 2023-10-05 DIAGNOSIS — M79.672 LEFT FOOT PAIN: ICD-10-CM

## 2023-10-05 DIAGNOSIS — R11.2 NAUSEA AND VOMITING, UNSPECIFIED VOMITING TYPE: Primary | ICD-10-CM

## 2023-10-05 DIAGNOSIS — Z51.5 PALLIATIVE CARE ENCOUNTER: ICD-10-CM

## 2023-10-05 DIAGNOSIS — D64.9 ANEMIA, UNSPECIFIED TYPE: ICD-10-CM

## 2023-10-05 DIAGNOSIS — M25.572 LEFT ANKLE PAIN: ICD-10-CM

## 2023-10-05 DIAGNOSIS — M77.52 BURSITIS OF LEFT FOOT: ICD-10-CM

## 2023-10-05 DIAGNOSIS — N18.4 CKD (CHRONIC KIDNEY DISEASE) STAGE 4, GFR 15-29 ML/MIN: ICD-10-CM

## 2023-10-05 DIAGNOSIS — E11.9 TYPE 2 DIABETES MELLITUS WITHOUT COMPLICATION, WITHOUT LONG-TERM CURRENT USE OF INSULIN: ICD-10-CM

## 2023-10-05 PROCEDURE — 3044F HG A1C LEVEL LT 7.0%: CPT | Mod: CPTII,,, | Performed by: FAMILY MEDICINE

## 2023-10-05 PROCEDURE — 3075F PR MOST RECENT SYSTOLIC BLOOD PRESS GE 130-139MM HG: ICD-10-PCS | Mod: CPTII,,, | Performed by: FAMILY MEDICINE

## 2023-10-05 PROCEDURE — 1159F MED LIST DOCD IN RCRD: CPT | Mod: CPTII,,, | Performed by: FAMILY MEDICINE

## 2023-10-05 PROCEDURE — 3044F PR MOST RECENT HEMOGLOBIN A1C LEVEL <7.0%: ICD-10-PCS | Mod: CPTII,,, | Performed by: FAMILY MEDICINE

## 2023-10-05 PROCEDURE — 3008F BODY MASS INDEX DOCD: CPT | Mod: CPTII,,, | Performed by: FAMILY MEDICINE

## 2023-10-05 PROCEDURE — 99999 PR PBB SHADOW E&M-EST. PATIENT-LVL IV: ICD-10-PCS | Mod: PBBFAC,,, | Performed by: FAMILY MEDICINE

## 2023-10-05 PROCEDURE — 1159F PR MEDICATION LIST DOCUMENTED IN MEDICAL RECORD: ICD-10-PCS | Mod: CPTII,,, | Performed by: FAMILY MEDICINE

## 2023-10-05 PROCEDURE — 3078F PR MOST RECENT DIASTOLIC BLOOD PRESSURE < 80 MM HG: ICD-10-PCS | Mod: CPTII,,, | Performed by: FAMILY MEDICINE

## 2023-10-05 PROCEDURE — 99284 EMERGENCY DEPT VISIT MOD MDM: CPT | Mod: 27

## 2023-10-05 PROCEDURE — 63600175 PHARM REV CODE 636 W HCPCS: Performed by: STUDENT IN AN ORGANIZED HEALTH CARE EDUCATION/TRAINING PROGRAM

## 2023-10-05 PROCEDURE — 99214 PR OFFICE/OUTPT VISIT, EST, LEVL IV, 30-39 MIN: ICD-10-PCS | Mod: S$PBB,,, | Performed by: FAMILY MEDICINE

## 2023-10-05 PROCEDURE — 3008F PR BODY MASS INDEX (BMI) DOCUMENTED: ICD-10-PCS | Mod: CPTII,,, | Performed by: FAMILY MEDICINE

## 2023-10-05 PROCEDURE — 99999 PR PBB SHADOW E&M-EST. PATIENT-LVL IV: CPT | Mod: PBBFAC,,, | Performed by: FAMILY MEDICINE

## 2023-10-05 PROCEDURE — 99214 OFFICE O/P EST MOD 30 MIN: CPT | Mod: S$PBB,,, | Performed by: FAMILY MEDICINE

## 2023-10-05 PROCEDURE — 96372 THER/PROPH/DIAG INJ SC/IM: CPT | Performed by: STUDENT IN AN ORGANIZED HEALTH CARE EDUCATION/TRAINING PROGRAM

## 2023-10-05 PROCEDURE — 4010F PR ACE/ARB THEARPY RXD/TAKEN: ICD-10-PCS | Mod: CPTII,,, | Performed by: FAMILY MEDICINE

## 2023-10-05 PROCEDURE — 3075F SYST BP GE 130 - 139MM HG: CPT | Mod: CPTII,,, | Performed by: FAMILY MEDICINE

## 2023-10-05 PROCEDURE — 99214 OFFICE O/P EST MOD 30 MIN: CPT | Mod: PBBFAC,PN | Performed by: FAMILY MEDICINE

## 2023-10-05 PROCEDURE — 4010F ACE/ARB THERAPY RXD/TAKEN: CPT | Mod: CPTII,,, | Performed by: FAMILY MEDICINE

## 2023-10-05 PROCEDURE — 3078F DIAST BP <80 MM HG: CPT | Mod: CPTII,,, | Performed by: FAMILY MEDICINE

## 2023-10-05 RX ORDER — ACETAZOLAMIDE 250 MG/1
TABLET ORAL
COMMUNITY
Start: 2023-09-12 | End: 2023-11-29 | Stop reason: SDUPTHER

## 2023-10-05 RX ORDER — METOLAZONE 5 MG/1
5 TABLET ORAL
Status: ON HOLD | COMMUNITY
Start: 2023-09-19 | End: 2023-12-15 | Stop reason: HOSPADM

## 2023-10-05 RX ORDER — TIRZEPATIDE 2.5 MG/.5ML
INJECTION, SOLUTION SUBCUTANEOUS
COMMUNITY
Start: 2023-09-19 | End: 2023-10-27

## 2023-10-05 RX ORDER — MORPHINE SULFATE 2 MG/ML
4 INJECTION, SOLUTION INTRAMUSCULAR; INTRAVENOUS
Status: COMPLETED | OUTPATIENT
Start: 2023-10-05 | End: 2023-10-05

## 2023-10-05 RX ORDER — ALLOPURINOL 300 MG/1
300 TABLET ORAL DAILY
Qty: 90 TABLET | Refills: 3 | Status: SHIPPED | OUTPATIENT
Start: 2023-10-05 | End: 2023-11-06 | Stop reason: SDUPTHER

## 2023-10-05 RX ORDER — HYDROCODONE BITARTRATE AND ACETAMINOPHEN 10; 325 MG/1; MG/1
1 TABLET ORAL EVERY 8 HOURS PRN
Qty: 90 TABLET | Refills: 0 | Status: SHIPPED | OUTPATIENT
Start: 2023-10-05 | End: 2023-10-26

## 2023-10-05 RX ADMIN — MORPHINE SULFATE 4 MG: 2 INJECTION, SOLUTION INTRAMUSCULAR; INTRAVENOUS at 10:10

## 2023-10-05 NOTE — TELEPHONE ENCOUNTER
Pt states no longer seeing the provider in [facility] will need new one to [facility]

## 2023-10-05 NOTE — PROGRESS NOTES
Subjective:       Patient ID: Hafsa Hawley is a 58 y.o. female.    Chief Complaint: Follow-up (Er f/u pt states she has multiple issues she would like to discuss with PCP)    Pt is a 58 y.o. female who presents for check up for Nausea and vomiting, unspecified vomiting type  (primary encounter diagnosis)  Ckd (chronic kidney disease) stage 4, gfr 15-29 ml/min  Anemia, unspecified type  Type 2 diabetes mellitus without complication, without long-term current use of insulin  Gout, arthritis. Doing well on current meds. Denies any side effects. Prevention is up to date.     Follow-up  Associated symptoms include arthralgias, joint swelling, nausea and vomiting.     Review of Systems   Cardiovascular:         End stage heart disease & is on a Dobutrex drip   Gastrointestinal:  Positive for nausea and vomiting.   Musculoskeletal:  Positive for arthralgias and joint swelling.        Dorsum L foot is swelling & very painful       Objective:      Physical Exam  Constitutional:       Appearance: She is well-developed. She is ill-appearing.   HENT:      Head: Normocephalic.   Eyes:      Pupils: Pupils are equal, round, and reactive to light.   Neck:      Thyroid: No thyromegaly.   Cardiovascular:      Rate and Rhythm: Normal rate and regular rhythm.   Pulmonary:      Effort: No respiratory distress.      Breath sounds: No wheezing or rales.   Chest:      Chest wall: No tenderness.   Abdominal:      General: There is no distension.      Tenderness: There is no abdominal tenderness. There is no guarding or rebound.   Musculoskeletal:         General: Swelling and tenderness present. Normal range of motion.      Cervical back: Normal range of motion and neck supple.      Comments: Dorsum L foot is red, swollen andTTP 4-5 cm dorsal foot bursae   Lymphadenopathy:      Cervical: No cervical adenopathy.   Skin:     General: Skin is warm and dry.      Coloration: Skin is not pale.      Findings: No rash.   Neurological:       Mental Status: She is alert and oriented to person, place, and time.      Cranial Nerves: No cranial nerve deficit.      Motor: No abnormal muscle tone.      Coordination: Coordination normal.      Deep Tendon Reflexes: Reflexes are normal and symmetric. Reflexes normal.   Psychiatric:         Thought Content: Thought content normal.         Judgment: Judgment normal.         Assessment:       Encounter Diagnoses   Name Primary?    Nausea and vomiting, unspecified vomiting type Yes    CKD (chronic kidney disease) stage 4, GFR 15-29 ml/min     Anemia, unspecified type          Plan:   1. Nausea and vomiting, unspecified vomiting type    2. CKD (chronic kidney disease) stage 4, GFR 15-29 ml/min    3. Anemia, unspecified type

## 2023-10-06 ENCOUNTER — TELEPHONE (OUTPATIENT)
Dept: FAMILY MEDICINE | Facility: CLINIC | Age: 58
End: 2023-10-06

## 2023-10-06 NOTE — TELEPHONE ENCOUNTER
----- Message from Gustavo Allen sent at 10/6/2023 12:58 PM CDT -----  Hafsa Hawley  MRN: [id number]  : 1965  PCP: Cristobal Ann  Home Phone      [phone]  Work Phone      Not on file.  Mobile          [phone]  Home Phone      [phone]      MESSAGE:   Patient is saying the pharmacy wont fill her prescription ,HYDROcodone-acetaminophen (NORCO)  mg per tablet because another doctor made her a prescription for the same medication. She wants to know can you write her out something else.    [phone]

## 2023-10-06 NOTE — TELEPHONE ENCOUNTER
Patient is saying the pharmacy wont fill her prescription ,HYDROcodone-acetaminophen (NORCO)  mg per tablet because another doctor made her a prescription for the same medication.     Pt requesting a different Rx be sent in    Please advise

## 2023-10-06 NOTE — ED TRIAGE NOTES
"58 y.o. female presents to ER ED 02/ED 02A   Chief Complaint   Patient presents with    Foot Injury     Pt present to ED with c/o L foot pain rated 10/10 after "twisting foot" on Sunday. Pt reports taking Norco at home with no relief. Obvious swelling noted to L foot.     "

## 2023-10-06 NOTE — ED PROVIDER NOTES
"Encounter Date: 10/5/2023       History     Chief Complaint   Patient presents with    Foot Injury     Pt present to ED with c/o L foot pain rated 10/10 after "twisting foot" on Sunday. Pt reports taking Norco at home with no relief. Obvious swelling noted to L foot.     58-year-old female with history of AFib, CHF, hypertension, diabetes, presenting with left foot pain.  Patient reports that two days ago she was walking, stepped in a small digit, and she rolled her foot.  Reports pain with ambulation.  Reports taking her home [place] without any improvement.  No numbness or weakness.      Review of patient's allergies indicates:   Allergen Reactions    Penicillins Hives and Other (See Comments)    Iodinated contrast media Nausea And Vomiting    Oxycodone-acetaminophen Other (See Comments)     Nausea, Dizziness, Anxiety.  "I don't like how it makes me feel."   Given Hydromorphone 0.5mg IVP  Without problems.  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 Metabolism of other drugs not listed here may also be impacted by UGT1A1 enzyme activity.       Tramadol Nausea And Vomiting and Other (See Comments)     Other reaction(s): Other (See Comments)    Valsartan Other (See Comments)     Past Medical History:   Diagnosis Date    Anemia     Arthritis     Atrial fibrillation     OAC    Chronic respiratory failure with hypoxia, on home oxygen therapy     2L with activity, off at rest. "  Per Pulm  no overt evidence of ILD or COPD on PFTs and CT to explain O2 needs.    CKD (chronic kidney disease), stage IV 05/08/2018    Congestive heart failure     s/p AICD placement,    Deep vein thrombosis     Depression     elevated bilirubin d/t Gilbert's syndrome     confirmed by [place] genetic testing, evaluated by hepatology    Encounter for blood transfusion     GERD (gastroesophageal reflux disease)     Hypertension     Pheochromocytoma, malignant     Right kidney mass     Sleep apnea     Thalassemia trait, alpha     Thyroid disease     Type 2 diabetes mellitus with hyperglycemia, without long-term current use of insulin 08/13/2020     Past Surgical History:   Procedure Laterality Date    APPENDECTOMY      BONE MARROW BIOPSY      CARDIAC DEFIBRILLATOR PLACEMENT Left 12/2016    CARDIAC ELECTROPHYSIOLOGY MAPPING AND ABLATION      CARDIAC ELECTROPHYSIOLOGY MAPPING AND ABLATION      COLONOSCOPY N/A 5/6/2022    Procedure: COLONOSCOPY;  Surgeon: Arely Betancourt MD;  Location: [facility] ENDO ([address] [place] [address]);  Service: Endoscopy;  Laterality: N/A;  heart transplant candidate/ EF 25% - 2nd floor/ defib - Biotronik - ERW  Eliquis - per Dr. Cortez with [facility] [place], Pt ok to hold Eliquis x 2 days prior-see media tab-outside correspondence dated 12/30/21  - ERW  verbal instructions/portal instructions/email instructions - s    EYE SURGERY      due to running tears    FRACTURE SURGERY Left     hand 5th digit    HYSTERECTOMY      KNEE SURGERY Left 2016    hematoma    LIVER BIOPSY  10/24/2018    Minimal steatosis, predominantly macrovesicular, 1%, Minimal nonspecific portal inflammation, no fibrosis. No findings on biopsy to explain elevated bilirubin levels. Could be d/t Gilbert's =?- hemolysis    RIGHT HEART CATHETERIZATION Right 12/07/2021    Procedure: INSERTION, CATHETER, RIGHT HEART;  Surgeon: Irving Cardenas MD;  Location: [facility] CATH LAB;  Service: Cardiology;  Laterality: Right;    RIGHT HEART CATHETERIZATION Right 12/19/2022     Procedure: INSERTION, CATHETER, RIGHT HEART;  Surgeon: Burke Camilo MD;  Location: [facility] CATH LAB;  Service: Cardiology;  Laterality: Right;    RIGHT HEART CATHETERIZATION Right 3/29/2023    Procedure: INSERTION, CATHETER, RIGHT HEART;  Surgeon: Katie Liriano DO;  Location: [facility] CATH LAB;  Service: Cardiology;  Laterality: Right;    TRANSJUGULAR BIOPSY OF LIVER N/A 10/24/2018    Procedure: BIOPSY, LIVER, TRANSJUGULAR APPROACH;  Surgeon: Carmen Diagnostic Provider;  [facility];  Service: Radiology;  Laterality: N/A;     Family History   Problem Relation Age of Onset    Cancer Mother         pancreatic CA early 50's    Heart disease Father          MI in late 50's    Hypertension Father     Heart attack Father     Heart disease Sister     Heart disease Brother     Cirrhosis Brother         alcoholic    Heart disease Sister     Heart disease Brother     Hypertension Brother     Diabetes Brother      Social History     Tobacco Use    Smoking status: Never    Smokeless tobacco: Never   Substance Use Topics    Alcohol use: Not Currently     Comment: up to 1 yr ago drank 2-3 drinks on occasion but sporadic    Drug use: No     Review of Systems   Constitutional:  Negative for fever.   HENT:  Negative for sore throat.    Respiratory:  Negative for shortness of breath.    Cardiovascular:  Negative for chest pain.   Gastrointestinal:  Negative for nausea.   Genitourinary:  Negative for dysuria.   Musculoskeletal:  Negative for back pain.        Left foot pain   Skin:  Negative for rash.   Neurological:  Negative for weakness.   Hematological:  Does not bruise/bleed easily.       Physical Exam     Initial Vitals [10/05/23 2132]   BP Pulse Resp Temp SpO2   (!) 158/75 88 19 99 °F (37.2 °C) 96 %      MAP       --         Physical Exam    Nursing note and vitals reviewed.  Constitutional: She appears well-developed.   HENT:   Head: Normocephalic.   Eyes: Pupils are equal, round, and reactive to  light.   Neck:   Normal range of motion.  Cardiovascular:            No murmur heard.  Pulmonary/Chest: No respiratory distress.   Abdominal: Abdomen is soft.   Musculoskeletal:         General: No edema.      Cervical back: Normal range of motion.      Comments: Patient has swelling and tenderness to palpation over left midfoot.  No medial or lateral malleolar tenderness to palpation.  No 5th metatarsal tenderness to palpation.     Neurological: She is alert.   Skin: Skin is warm.   Psychiatric: She has a normal mood and affect.         ED Course   Procedures  Labs Reviewed - No data to display       Imaging Results              X-Ray Foot Complete Left (Final result)  Result time 10/05/23 23:20:28      Final result by Neil Miguel MD (10/05/23 23:20:28)                   Impression:      No radiographic evidence of acute displaced fracture or dislocation of the left foot.      Electronically signed by: Neil Miguel MD  Date:    10/05/2023  Time:    23:20               Narrative:    EXAMINATION:  XR FOOT COMPLETE 3 VIEW LEFT    CLINICAL HISTORY:  .  Pain in left foot    TECHNIQUE:  AP, lateral and oblique views of the left foot were performed.    COMPARISON:  Bilateral foot radiograph series 05/09/2023    FINDINGS:  There is no radiographic evidence of acute displaced fracture or dislocation of the left foot.  No significant widening of the Lisfranc interval appreciated on this nonweightbearing view.  There is mild calcaneal enthesopathic change at the plantar fascia attachment.  No retained radiopaque foreign body appreciated within the soft tissues.                                       X-Ray Ankle Complete Left (Final result)  Result time 10/05/23 23:17:57      Final result by Neil Miguel MD (10/05/23 23:17:57)                   Impression:      No radiographic evidence of acute displaced fracture or dislocation of the left ankle.      Electronically signed by: Neil Miguel  MD  Date:    10/05/2023  Time:    23:17               Narrative:    EXAMINATION:  XR ANKLE COMPLETE 3 VIEW LEFT    CLINICAL HISTORY:  Pain in left ankle and joints of left foot    TECHNIQUE:  AP, lateral and oblique views of the left ankle were performed.    COMPARISON:  Left foot radiograph series 05/09/2023    FINDINGS:  There is no radiographic evidence of acute displaced fracture or dislocation of the left ankle.  The ankle mortise appears maintained and symmetric.  There is mild calcaneal enthesopathic change at the plantar fascia attachment.  No retained radiopaque foreign body identified within the visualized soft tissues.                                       Medications   morphine injection 4 mg (4 mg Intramuscular Given 10/5/23 2230)     Medical Decision Making  DDX: R/o fracture vs. sprain/contusion.  DX: XR.  TX: Analgesia PRN. Treatment/consult as indicated by studies.  Dispo: Pending studies. If studies WNL or pathology stabilized for discharge, discharge to f/u with PMD vs. orthopedics with precautions for RTED and supportive care recommendations.        Amount and/or Complexity of Data Reviewed  Radiology: ordered.    Risk  Prescription drug management.                               Clinical Impression:   Final diagnoses:  [M25.572] Left ankle pain  [M79.672] Left foot pain               Efe Norris MD  10/05/23 3431       Efe Norris MD  10/05/23 2693

## 2023-10-06 NOTE — DISCHARGE INSTRUCTIONS
Please follow up with your primary care physician within 2 days. Ensure that you review all lab work results and/or imaging results. If you have any questions about your discharge paperwork please call the Emergency Department.     Return to the ED for any numbness, tingling, weakness in the extremity, color changes, increasing pain, uncontrolled pain, (all signs of possible compartment syndrome) or any new or worsening symptoms.     If you were prescribed antibiotics, please take them to completion. If you were prescribed a narcotic or any sedating medication, do not drive or operate heavy equipment or machinery while taking these medications.  If you were diagnosed with a seizure, syncope, any loss of consciousness or decreased alertness, do not drive, swim, operate heavy machinery, or per yourself in any position where a sudden loss of consciousness could put herself or others in danger.    Thank you for visiting [facility], Department of Emergency Medicine. Please see the entirety of the educational materials provided. Please note that a visit to the emergency department does not substitute ongoing care from a primary medical provider or specialist. Please ensure to follow up as recommended. However, please return to the emergency department immediately if symptoms do not improve as discussed, symptoms worsen, new symptoms develop, difficulty in following up or for any of your concerns or issues. Please note on discharge you are acknowledging understanding and agreement on medical evaluation, management recommendations and follow up recommendations.

## 2023-10-07 ENCOUNTER — HOSPITAL ENCOUNTER (EMERGENCY)
Facility: HOSPITAL | Age: 58
Discharge: HOME | End: 2023-10-07
Attending: SURGERY
Payer: MEDICAID

## 2023-10-07 ENCOUNTER — NURSE TRIAGE (OUTPATIENT)
Dept: ADMINISTRATIVE | Facility: CLINIC | Age: 58
End: 2023-10-07
Payer: MEDICAID

## 2023-10-07 VITALS
RESPIRATION RATE: 20 BRPM | DIASTOLIC BLOOD PRESSURE: 75 MMHG | HEART RATE: 88 BPM | TEMPERATURE: 99 F | HEIGHT: 63 IN | BODY MASS INDEX: 32.95 KG/M2 | SYSTOLIC BLOOD PRESSURE: 137 MMHG | OXYGEN SATURATION: 98 %

## 2023-10-07 DIAGNOSIS — R10.13 EPIGASTRIC PAIN: ICD-10-CM

## 2023-10-07 DIAGNOSIS — M10.9 ACUTE GOUT OF LEFT FOOT, UNSPECIFIED CAUSE: Primary | ICD-10-CM

## 2023-10-07 LAB
ALBUMIN SERPL BCP-MCNC: 3.7 G/DL (ref 3.5–5.2)
ALP SERPL-CCNC: 62 U/L (ref 55–135)
ALT SERPL W/O P-5'-P-CCNC: 13 U/L (ref 10–44)
ANION GAP SERPL CALC-SCNC: 12 MMOL/L (ref 8–16)
ANISOCYTOSIS BLD QL SMEAR: ABNORMAL
AST SERPL-CCNC: 15 U/L (ref 10–40)
BASOPHILS # BLD AUTO: 0.02 K/UL (ref 0–0.2)
BASOPHILS NFR BLD: 0.3 % (ref 0–1.9)
BILIRUB SERPL-MCNC: 1.1 MG/DL (ref 0.1–1)
BUN SERPL-MCNC: 45 MG/DL (ref 6–20)
CALCIUM SERPL-MCNC: 9.1 MG/DL (ref 8.7–10.5)
CHLORIDE SERPL-SCNC: 105 MMOL/L (ref 95–110)
CO2 SERPL-SCNC: 22 MMOL/L (ref 23–29)
CREAT SERPL-MCNC: 2.6 MG/DL (ref 0.5–1.4)
DACRYOCYTES BLD QL SMEAR: ABNORMAL
DIFFERENTIAL METHOD: ABNORMAL
EOSINOPHIL # BLD AUTO: 0.1 K/UL (ref 0–0.5)
EOSINOPHIL NFR BLD: 1.3 % (ref 0–8)
ERYTHROCYTE [DISTWIDTH] IN BLOOD BY AUTOMATED COUNT: 27.2 % (ref 11.5–14.5)
EST. GFR  (NO RACE VARIABLE): 21 ML/MIN/1.73 M^2
GLUCOSE SERPL-MCNC: 154 MG/DL (ref 70–110)
HCT VFR BLD AUTO: 30.4 % (ref 37–48.5)
HGB BLD-MCNC: 8.9 G/DL (ref 12–16)
HYPOCHROMIA BLD QL SMEAR: ABNORMAL
IMM GRANULOCYTES # BLD AUTO: 0.03 K/UL (ref 0–0.04)
IMM GRANULOCYTES NFR BLD AUTO: 0.4 % (ref 0–0.5)
LIPASE SERPL-CCNC: 54 U/L (ref 4–60)
LYMPHOCYTES # BLD AUTO: 0.4 K/UL (ref 1–4.8)
LYMPHOCYTES NFR BLD: 6.6 % (ref 18–48)
MCH RBC QN AUTO: 17.5 PG (ref 27–31)
MCHC RBC AUTO-ENTMCNC: 29.3 G/DL (ref 32–36)
MCV RBC AUTO: 60 FL (ref 82–98)
MONOCYTES # BLD AUTO: 0.4 K/UL (ref 0.3–1)
MONOCYTES NFR BLD: 6.4 % (ref 4–15)
NEUTROPHILS # BLD AUTO: 5.7 K/UL (ref 1.8–7.7)
NEUTROPHILS NFR BLD: 85 % (ref 38–73)
NRBC BLD-RTO: 0 /100 WBC
OVALOCYTES BLD QL SMEAR: ABNORMAL
PLATELET # BLD AUTO: 173 K/UL (ref 150–450)
PLATELET BLD QL SMEAR: ABNORMAL
PMV BLD AUTO: ABNORMAL FL (ref 9.2–12.9)
POIKILOCYTOSIS BLD QL SMEAR: ABNORMAL
POLYCHROMASIA BLD QL SMEAR: ABNORMAL
POTASSIUM SERPL-SCNC: 4.9 MMOL/L (ref 3.5–5.1)
PROT SERPL-MCNC: 7.1 G/DL (ref 6–8.4)
RBC # BLD AUTO: 5.09 M/UL (ref 4–5.4)
SCHISTOCYTES BLD QL SMEAR: PRESENT
SODIUM SERPL-SCNC: 139 MMOL/L (ref 136–145)
SPHEROCYTES BLD QL SMEAR: ABNORMAL
TARGETS BLD QL SMEAR: ABNORMAL
TROPONIN I SERPL DL<=0.01 NG/ML-MCNC: 1.4 NG/ML (ref 0–0.03)
TROPONIN I SERPL DL<=0.01 NG/ML-MCNC: 1.47 NG/ML (ref 0–0.03)
URATE SERPL-MCNC: 10 MG/DL (ref 2.4–5.7)
WBC # BLD AUTO: 6.7 K/UL (ref 3.9–12.7)

## 2023-10-07 PROCEDURE — 25000003 PHARM REV CODE 250: Performed by: NURSE PRACTITIONER

## 2023-10-07 PROCEDURE — 99284 EMERGENCY DEPT VISIT MOD MDM: CPT | Mod: 25

## 2023-10-07 PROCEDURE — 96376 TX/PRO/DX INJ SAME DRUG ADON: CPT

## 2023-10-07 PROCEDURE — 63600175 PHARM REV CODE 636 W HCPCS: Performed by: NURSE PRACTITIONER

## 2023-10-07 PROCEDURE — 83690 ASSAY OF LIPASE: CPT | Performed by: NURSE PRACTITIONER

## 2023-10-07 PROCEDURE — 36415 COLL VENOUS BLD VENIPUNCTURE: CPT | Performed by: NURSE PRACTITIONER

## 2023-10-07 PROCEDURE — 80053 COMPREHEN METABOLIC PANEL: CPT | Performed by: NURSE PRACTITIONER

## 2023-10-07 PROCEDURE — 84550 ASSAY OF BLOOD/URIC ACID: CPT | Performed by: NURSE PRACTITIONER

## 2023-10-07 PROCEDURE — 84484 ASSAY OF TROPONIN QUANT: CPT | Mod: 91 | Performed by: NURSE PRACTITIONER

## 2023-10-07 PROCEDURE — 85025 COMPLETE CBC W/AUTO DIFF WBC: CPT | Performed by: NURSE PRACTITIONER

## 2023-10-07 PROCEDURE — 93010 ELECTROCARDIOGRAM REPORT: CPT | Mod: ,,, | Performed by: INTERNAL MEDICINE

## 2023-10-07 PROCEDURE — 93010 EKG 12-LEAD: ICD-10-PCS | Mod: ,,, | Performed by: INTERNAL MEDICINE

## 2023-10-07 PROCEDURE — 93005 ELECTROCARDIOGRAM TRACING: CPT

## 2023-10-07 PROCEDURE — 96374 THER/PROPH/DIAG INJ IV PUSH: CPT

## 2023-10-07 PROCEDURE — 96372 THER/PROPH/DIAG INJ SC/IM: CPT | Mod: 59 | Performed by: NURSE PRACTITIONER

## 2023-10-07 RX ORDER — MORPHINE SULFATE 2 MG/ML
4 INJECTION, SOLUTION INTRAMUSCULAR; INTRAVENOUS
Status: COMPLETED | OUTPATIENT
Start: 2023-10-07 | End: 2023-10-07

## 2023-10-07 RX ORDER — LIDOCAINE HYDROCHLORIDE 20 MG/ML
15 SOLUTION OROPHARYNGEAL ONCE
Status: COMPLETED | OUTPATIENT
Start: 2023-10-07 | End: 2023-10-07

## 2023-10-07 RX ORDER — MAG HYDROX/ALUMINUM HYD/SIMETH 200-200-20
30 SUSPENSION, ORAL (FINAL DOSE FORM) ORAL ONCE
Status: COMPLETED | OUTPATIENT
Start: 2023-10-07 | End: 2023-10-07

## 2023-10-07 RX ORDER — MORPHINE SULFATE 2 MG/ML
2 INJECTION, SOLUTION INTRAMUSCULAR; INTRAVENOUS
Status: COMPLETED | OUTPATIENT
Start: 2023-10-07 | End: 2023-10-07

## 2023-10-07 RX ORDER — ONDANSETRON 2 MG/ML
4 INJECTION INTRAMUSCULAR; INTRAVENOUS
Status: COMPLETED | OUTPATIENT
Start: 2023-10-07 | End: 2023-10-07

## 2023-10-07 RX ADMIN — ONDANSETRON 4 MG: 2 INJECTION INTRAMUSCULAR; INTRAVENOUS at 04:10

## 2023-10-07 RX ADMIN — ALUMINUM HYDROXIDE, MAGNESIUM HYDROXIDE, AND DIMETHICONE 30 ML: 200; 20; 200 SUSPENSION ORAL at 05:10

## 2023-10-07 RX ADMIN — LIDOCAINE HYDROCHLORIDE 15 ML: 20 SOLUTION ORAL; TOPICAL at 05:10

## 2023-10-07 RX ADMIN — MORPHINE SULFATE 4 MG: 2 INJECTION, SOLUTION INTRAMUSCULAR; INTRAVENOUS at 04:10

## 2023-10-07 RX ADMIN — MORPHINE SULFATE 2 MG: 2 INJECTION, SOLUTION INTRAMUSCULAR; INTRAVENOUS at 08:10

## 2023-10-07 NOTE — ED TRIAGE NOTES
C/o left foot pain x 1 week. Patient reports stepped on an uneven part of her floor last week. Patient seen for same 2 days ago and no relief of pain.

## 2023-10-07 NOTE — ED PROVIDER NOTES
"Encounter Date: 10/7/2023       History     Chief Complaint   Patient presents with    Foot Pain     Hafsa Hawley is a 58 y.o. female with PMH of atrial fibrillation, chronic respiratory failure on home O2, congestive heart failure, GERD, hypertension, DM type 2, CKD who presents to the ED for evaluation of left foot pain.  Patient reports twisting injury 2 days ago while at home.  She was evaluated in the ED with negative x-rays.  She presents with continued left dorsal foot pain that she describes as throbbing, currently rated 10/10 in severity.  She reports associated swelling and redness. + hx of gout. She currently takes norco and allopurinol but reports that this medication is not helping pain control.     The history is provided by the patient.     Review of patient's allergies indicates:   Allergen Reactions    Penicillins Hives and Other (See Comments)    Iodinated contrast media Nausea And Vomiting    Oxycodone-acetaminophen Other (See Comments)     Nausea, Dizziness, Anxiety.  "I don't like how it makes me feel."   Given Hydromorphone 0.5mg IVP  Without problems.  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 Metabolism of other drugs not listed here may also be impacted by UGT1A1 enzyme activity.       Tramadol Nausea And Vomiting and Other (See Comments)     Other reaction(s): Other (See Comments)    Valsartan Other (See Comments)     Past Medical History: "   Diagnosis Date    Anemia     Arthritis     Atrial fibrillation     OAC    Chronic respiratory failure with hypoxia, on home oxygen therapy     2L with activity, off at rest.  Per Pulm  no overt evidence of ILD or COPD on PFTs and CT to explain O2 needs.    CKD (chronic kidney disease), stage IV 05/08/2018    Congestive heart failure     s/p AICD placement,    Deep vein thrombosis     Depression     elevated bilirubin d/t Gilbert's syndrome     confirmed by [place] genetic testing, evaluated by hepatology    Encounter for blood transfusion     GERD (gastroesophageal reflux disease)     Hypertension     Pheochromocytoma, malignant     Right kidney mass     Sleep apnea     Thalassemia trait, alpha     Thyroid disease     Type 2 diabetes mellitus with hyperglycemia, without long-term current use of insulin 08/13/2020     Past Surgical History:   Procedure Laterality Date    APPENDECTOMY      BONE MARROW BIOPSY      CARDIAC DEFIBRILLATOR PLACEMENT Left 12/2016    CARDIAC ELECTROPHYSIOLOGY MAPPING AND ABLATION      CARDIAC ELECTROPHYSIOLOGY MAPPING AND ABLATION      COLONOSCOPY N/A 5/6/2022    Procedure: COLONOSCOPY;  Surgeon: Arely Betancourt MD;  Location: [facility] ([address] [place] [address]);  Service: Endoscopy;  Laterality: N/A;  heart transplant candidate/ EF 25% - 2nd floor/ defib - Biotronik - ERW  Eliquis - per Dr. Cortez with [facility] [place], Pt ok to hold Eliquis x 2 days prior-see media tab-outside correspondence dated 12/30/21  - ERW  verbal instructions/portal instructions/email instructions - s    EYE SURGERY      due to running tears    FRACTURE SURGERY Left     hand 5th digit    HYSTERECTOMY      KNEE SURGERY Left 2016    hematoma    LIVER BIOPSY  10/24/2018    Minimal steatosis, predominantly macrovesicular, 1%, Minimal nonspecific portal inflammation, no fibrosis. No findings on biopsy to explain elevated bilirubin levels. Could be d/t Gilbert's =?- hemolysis    RIGHT HEART CATHETERIZATION Right 12/07/2021    Procedure:  INSERTION, CATHETER, RIGHT HEART;  Surgeon: Irving Cardenas MD;  Location: [facility] CATH LAB;  Service: Cardiology;  Laterality: Right;    RIGHT HEART CATHETERIZATION Right 2022    Procedure: INSERTION, CATHETER, RIGHT HEART;  Surgeon: Burke Camilo MD;  Location: [facility] CATH LAB;  Service: Cardiology;  Laterality: Right;    RIGHT HEART CATHETERIZATION Right 3/29/2023    Procedure: INSERTION, CATHETER, RIGHT HEART;  Surgeon: Katie Liriano DO;  Location: [facility] CATH LAB;  Service: Cardiology;  Laterality: Right;    TRANSJUGULAR BIOPSY OF LIVER N/A 10/24/2018    Procedure: BIOPSY, LIVER, TRANSJUGULAR APPROACH;  Surgeon: Carmen Diagnostic Provider;  Location: [facility] OR [address] [place] [address];  Service: Radiology;  Laterality: N/A;     Family History   Problem Relation Age of Onset    Cancer Mother         pancreatic CA early 50's    Heart disease Father          MI in late 50's    Hypertension Father     Heart attack Father     Heart disease Sister     Heart disease Brother     Cirrhosis Brother         alcoholic    Heart disease Sister     Heart disease Brother     Hypertension Brother     Diabetes Brother      Social History     Tobacco Use    Smoking status: Never    Smokeless tobacco: Never   Substance Use Topics    Alcohol use: Not Currently     Comment: up to 1 yr ago drank 2-3 drinks on occasion but sporadic    Drug use: No     Review of Systems   Constitutional:  Negative for fever.   HENT:  Negative for sore throat.    Respiratory:  Negative for chest tightness and shortness of breath.    Cardiovascular:  Negative for chest pain.   Gastrointestinal:  Negative for abdominal pain and nausea.   Genitourinary:  Negative for dysuria, frequency and urgency.   Musculoskeletal:  Positive for arthralgias and gait problem. Negative for back pain.   Skin:  Negative for rash.   Neurological:  Negative for dizziness, weakness, light-headedness and numbness.   Hematological:  Does not bruise/bleed easily.       Physical Exam      Initial Vitals [10/07/23 1629]   BP Pulse Resp Temp SpO2   135/77 88 18 98.9 °F (37.2 °C) 96 %      MAP       --         Physical Exam    Nursing note and vitals reviewed.  Constitutional: She appears well-developed and well-nourished.   HENT:   Head: Normocephalic and atraumatic.   Right Ear: Tympanic membrane, external ear and ear canal normal. Tympanic membrane is not erythematous. No middle ear effusion.   Left Ear: Tympanic membrane, external ear and ear canal normal. Tympanic membrane is not erythematous.  No middle ear effusion.   Nose: Nose normal.   Mouth/Throat: Uvula is midline, oropharynx is clear and moist and mucous membranes are normal. Mucous membranes are not pale and not dry.   Eyes: Conjunctivae and EOM are normal. Pupils are equal, round, and reactive to light.   Neck: Neck supple.   Normal range of motion.  Cardiovascular:  Normal rate, regular rhythm, normal heart sounds and intact distal pulses.           Pulmonary/Chest: Effort normal and breath sounds normal. She has no decreased breath sounds. She has no wheezes. She has no rhonchi. She has no rales.   Abdominal: Abdomen is soft. Bowel sounds are normal. There is no abdominal tenderness.   Musculoskeletal:      Cervical back: Normal range of motion and neck supple.      Left foot: Decreased range of motion. Tenderness present.        Legs:      Neurological: She is alert and oriented to person, place, and time. She has normal strength. She displays normal reflexes. No cranial nerve deficit or sensory deficit.   Skin: Skin is warm and dry. Capillary refill takes less than 2 seconds. No rash noted.   Psychiatric: She has a normal mood and affect. Her behavior is normal. Judgment and thought content normal.         ED Course   Procedures  Labs Reviewed   CBC W/ AUTO DIFFERENTIAL - Abnormal; Notable for the following components:       Result Value    Hemoglobin 8.9 (*)     Hematocrit 30.4 (*)     MCV 60 (*)     MCH 17.5 (*)     MCHC 29.3  (*)     RDW 27.2 (*)     Lymph # 0.4 (*)     Gran % 85.0 (*)     Lymph % 6.6 (*)     All other components within normal limits   COMPREHENSIVE METABOLIC PANEL - Abnormal; Notable for the following components:    CO2 22 (*)     Glucose 154 (*)     BUN 45 (*)     Creatinine 2.6 (*)     Total Bilirubin 1.1 (*)     eGFR 21 (*)     All other components within normal limits   URIC ACID - Abnormal; Notable for the following components:    Uric Acid 10.0 (*)     All other components within normal limits   TROPONIN I - Abnormal; Notable for the following components:    Troponin I 1.396 (*)     All other components within normal limits   TROPONIN I - Abnormal; Notable for the following components:    Troponin I 1.468 (*)     All other components within normal limits   LIPASE   TROPONIN I   LIPASE          Imaging Results    None          Medications   morphine injection 4 mg (4 mg Intravenous Given 10/7/23 1657)   ondansetron injection 4 mg (4 mg Intramuscular Given 10/7/23 1657)   aluminum-magnesium hydroxide-simethicone 200-200-20 mg/5 mL suspension 30 mL (30 mLs Oral Given 10/7/23 1748)     And   LIDOcaine HCl 2% oral solution 15 mL (15 mLs Oral Given 10/7/23 1748)   morphine injection 2 mg (2 mg Intravenous Given 10/7/23 2009)     Medical Decision Making  Evaluation of a 58-year-old female with left foot pain.  Patient with recent history of twisting injury while at home with continued left foot pain.  She had negative x-rays of left foot and ankle on 10/05/2023, but presents with 10/10 pain.  She does have left lateral dorsal tenderness with palpation with mild swelling on exam.  Good distal pulses.    Differential diagnosis includes gout, tendinitis, contusion    Problems Addressed:  Acute gout of left foot, unspecified cause: acute illness or injury     Details: Continue Norco at home and allopurinol as previously prescribed  Epigastric pain: acute illness or injury     Details: Likely gastritis secondary to BC powder  use at home  Avoid NSAIDs, especially due to CKD and CHF      Amount and/or Complexity of Data Reviewed  Labs: ordered. Decision-making details documented in ED Course.     Details: CBC with no leukocytosis.  BUN 45, creatinine 2.6 which appears to be baseline.  Uric acid 10  Troponin 1.396 which also appears to be baseline.  ECG/medicine tests: ordered and independent interpretation performed.     Details: SR with PACs, rate 86. Normal OK and QRS intervals. No STEMI  When compared to EKG of September 2023, PACs now present    Risk  OTC drugs.  Prescription drug management.  Risk Details: Stable for DC home.  Symptoms likely gout related.  Patient also began complaining of acute epigastric pain while awaiting lab results. She notes that she has been taking BC powder for foot pain. Mild tenderness on exam that was relieved by GI Cocktail; added Trop and EKG. Trop 1.3 but appears to be baseline given extensive heart hx. EKG without changes. Complete relief of pain with GI cocktail; likely gastritis related. Repeat Trop with no significant increase. She was given Morphine for pain control with good relief and is requesting to go home. Continue lortab as previously rx. Patient/caregiver voices understanding and feels comfortable with discharge plan.      The patient acknowledges that close follow up with medical provider is required. Instructed to follow up with PCP within 2 days. Patient was given specific return precautions. The patient agrees to comply with all instruction and directions given in the ER.                 ED Course as of 10/08/23 1430   Sat Oct 07, 2023   2013 Troponin I(!): 1.468  No significant increased from patient's baseline.  Patient not suffering from any chest pain or shortness of breath.  Patient's only complaint is foot pain.  Will discharge home. [NB]      ED Course User Index  [NB] Efe Norris MD                    Clinical Impression:   Final diagnoses:  [M10.9] Acute gout of left  foot, unspecified cause (Primary)  [R10.13] Epigastric pain        ED Disposition Condition    Discharge Stable          ED Prescriptions    None       Follow-up Information       Follow up With Specialties Details Why Contact Info    Cristobal Ann MD Family Medicine Schedule an appointment as soon as possible for a visit in 2 days  [address] [facility] [address]  [phone]      [facility] Orthopedic Surgery Schedule an appointment as soon as possible for a visit in 2 days  [address] [place] [id number]  [phone]               Brigid Boswell, NP  10/08/23 [id number]

## 2023-10-07 NOTE — TELEPHONE ENCOUNTER
Reason for Disposition   Difficult to awaken or acting confused (e.g., disoriented, slurred speech)    Additional Information   Negative: SEVERE difficulty breathing (e.g., struggling for each breath, speaks in single words)    Protocols used: Chest Pain-A-AH  Pt called re having extreme pain and swelling in foot and leg. pt went to ED a few days ago. gave pain med shot. Pain and swelling getting worse. Has hydrocodone and its not helping. c/o nausea. weak. hx CHF, hx CKD. Pt admits to tug in chest.  Rec EMS now due to pt states she is feeling confused and disoriented. Pt states ED just 4 mi up the road. Reiterate EMS. Pt agrees.

## 2023-10-07 NOTE — DISCHARGE INSTRUCTIONS

## 2023-10-10 PROBLEM — J44.1 COPD EXACERBATION: Status: RESOLVED | Noted: 2023-04-27 | Resolved: 2023-10-10

## 2023-10-12 ENCOUNTER — HOSPITAL ENCOUNTER (EMERGENCY)
Facility: HOSPITAL | Age: 58
Discharge: LEFT BEFORE DISCHARGE | End: 2023-10-12
Attending: STUDENT IN AN ORGANIZED HEALTH CARE EDUCATION/TRAINING PROGRAM
Payer: MEDICAID

## 2023-10-12 ENCOUNTER — PATIENT OUTREACH (OUTPATIENT)
Dept: EMERGENCY MEDICINE | Facility: HOSPITAL | Age: 58
End: 2023-10-12

## 2023-10-12 VITALS
SYSTOLIC BLOOD PRESSURE: 170 MMHG | TEMPERATURE: 98 F | RESPIRATION RATE: 18 BRPM | WEIGHT: 187 LBS | OXYGEN SATURATION: 97 % | HEART RATE: 88 BPM | DIASTOLIC BLOOD PRESSURE: 80 MMHG | BODY MASS INDEX: 33.13 KG/M2

## 2023-10-12 DIAGNOSIS — M25.561 ACUTE PAIN OF RIGHT KNEE: Primary | ICD-10-CM

## 2023-10-12 PROCEDURE — 99281 EMR DPT VST MAYX REQ PHY/QHP: CPT

## 2023-10-12 RX ORDER — ACETAMINOPHEN 325 MG/1
650 TABLET ORAL
Status: DISCONTINUED | OUTPATIENT
Start: 2023-10-12 | End: 2023-10-13 | Stop reason: HOSPADM

## 2023-10-13 NOTE — ED PROVIDER NOTES
"Encounter Date: 10/12/2023       History     Chief Complaint   Patient presents with    Knee Pain     Pt arrives to the ed with c/o right knee pain. Pt was seen multiple times in the er recently for same complaint.     58-year-old female with history of CKD, heart failure, atrial fibrillation, presenting with right knee pain.  Patient reports this has atraumatic, has occurred previously.  She reports swelling to the medial side of the knee, and required aspiration by an orthopedist previously.  Denies any joint infection.  No other complaints.       Review of patient's allergies indicates:   Allergen Reactions    Penicillins Hives and Other (See Comments)    Iodinated contrast media Nausea And Vomiting    Oxycodone-acetaminophen Other (See Comments)     Nausea, Dizziness, Anxiety.  "I don't like how it makes me feel."   Given Hydromorphone 0.5mg IVP  Without problems.  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 Metabolism of other drugs not listed here may also be impacted by UGT1A1 enzyme activity.       Tramadol Nausea And Vomiting and Other (See Comments)     Other reaction(s): Other (See Comments)    Valsartan Other (See Comments)     Past Medical History:   Diagnosis Date    Anemia     Arthritis     Atrial fibrillation     OAC    Chronic respiratory failure with hypoxia, on home oxygen therapy     2L with activity, off at rest.  Per Pulm  no overt evidence of ILD or " COPD on PFTs and CT to explain O2 needs.    CKD (chronic kidney disease), stage IV 05/08/2018    Congestive heart failure     s/p AICD placement,    Deep vein thrombosis     Depression     elevated bilirubin d/t Gilbert's syndrome     confirmed by [place] genetic testing, evaluated by hepatology    Encounter for blood transfusion     GERD (gastroesophageal reflux disease)     Hypertension     Pheochromocytoma, malignant     Right kidney mass     Sleep apnea     Thalassemia trait, alpha     Thyroid disease     Type 2 diabetes mellitus with hyperglycemia, without long-term current use of insulin 08/13/2020     Past Surgical History:   Procedure Laterality Date    APPENDECTOMY      BONE MARROW BIOPSY      CARDIAC DEFIBRILLATOR PLACEMENT Left 12/2016    CARDIAC ELECTROPHYSIOLOGY MAPPING AND ABLATION      CARDIAC ELECTROPHYSIOLOGY MAPPING AND ABLATION      COLONOSCOPY N/A 5/6/2022    Procedure: COLONOSCOPY;  Surgeon: Arely Betancourt MD;  Location: [facility] ENDO ([address] [place] [address]);  Service: Endoscopy;  Laterality: N/A;  heart transplant candidate/ EF 25% - 2nd floor/ defib - Biotronik - ERW  Eliquis - per Dr. Cortez with [facility], Pt ok to hold Eliquis x 2 days prior-see media tab-outside correspondence dated 12/30/21  - ERW  verbal instructions/portal instructions/email instructions - s    EYE SURGERY      due to running tears    FRACTURE SURGERY Left     hand 5th digit    HYSTERECTOMY      KNEE SURGERY Left 2016    hematoma    LIVER BIOPSY  10/24/2018    Minimal steatosis, predominantly macrovesicular, 1%, Minimal nonspecific portal inflammation, no fibrosis. No findings on biopsy to explain elevated bilirubin levels. Could be d/t Gilbert's =?- hemolysis    RIGHT HEART CATHETERIZATION Right 12/07/2021    Procedure: INSERTION, CATHETER, RIGHT HEART;  Surgeon: Irving Cardenas MD;  Location: [facility] CATH LAB;  Service: Cardiology;  Laterality: Right;    RIGHT HEART CATHETERIZATION Right 12/19/2022    Procedure: INSERTION, CATHETER, RIGHT  HEART;  Surgeon: Burke Camilo MD;  Location: [facility] CATH LAB;  Service: Cardiology;  Laterality: Right;    RIGHT HEART CATHETERIZATION Right 3/29/2023    Procedure: INSERTION, CATHETER, RIGHT HEART;  Surgeon: Katie Liriano DO;  Location: [facility] CATH LAB;  Service: Cardiology;  Laterality: Right;    TRANSJUGULAR BIOPSY OF LIVER N/A 10/24/2018    Procedure: BIOPSY, LIVER, TRANSJUGULAR APPROACH;  Surgeon: Carmen Diagnostic Provider;  Location: [facility] OR [address] [place] [address];  Service: Radiology;  Laterality: N/A;     Family History   Problem Relation Age of Onset    Cancer Mother         pancreatic CA early 50's    Heart disease Father          MI in late 50's    Hypertension Father     Heart attack Father     Heart disease Sister     Heart disease Brother     Cirrhosis Brother         alcoholic    Heart disease Sister     Heart disease Brother     Hypertension Brother     Diabetes Brother      Social History     Tobacco Use    Smoking status: Never    Smokeless tobacco: Never   Substance Use Topics    Alcohol use: Not Currently     Comment: up to 1 yr ago drank 2-3 drinks on occasion but sporadic    Drug use: No     Review of Systems   Constitutional:  Negative for fever.   HENT:  Negative for sore throat.    Respiratory:  Negative for shortness of breath.    Cardiovascular:  Negative for chest pain.   Gastrointestinal:  Negative for nausea.   Genitourinary:  Negative for dysuria.   Musculoskeletal:  Negative for back pain.        Right knee pain   Skin:  Negative for rash.   Neurological:  Negative for weakness.   Hematological:  Does not bruise/bleed easily.       Physical Exam     Initial Vitals [10/12/23 2220]   BP Pulse Resp Temp SpO2   (!) 170/80 88 18 97.8 °F (36.6 °C) 97 %      MAP       --         Physical Exam    Nursing note and vitals reviewed.  Constitutional: She appears well-developed.   HENT:   Head: Normocephalic.   Eyes: Pupils are equal, round, and reactive to light.   Neck:   Normal range of  motion.  Cardiovascular:            No murmur heard.  Pulmonary/Chest: No respiratory distress.   Abdominal: Abdomen is soft.   Musculoskeletal:         General: No edema.      Cervical back: Normal range of motion.      Comments: DP pulse 2+, 5/5 strength w/ dorsiflexion, plantar flexion, and fulling moving all toes. Full ROM of bilateral knees. No erythema. No edema or joint effusion. R knee anterior drawer, posterior, drawer, valgus and varus stress all negative for joint instability or reproduction of pain. No locking. No palpable Henderson's cyst. Bearing weight bilaterally. Distal femur, proximal fibula and tibia non-TTP. No patellar tenderness. No patellar dislocation. No patellar tendon tenderness. 5/5 strength w/ extension and flexion of knee. All compartments soft.       Neurological: She is alert.   Skin: Skin is warm.   Psychiatric: She has a normal mood and affect.         ED Course   Procedures  Labs Reviewed - No data to display       Imaging Results    None          Medications   acetaminophen tablet 650 mg (has no administration in time range)     Medical Decision Making  DDX: R/o fracture vs. sprain/contusion.  DX: XR.  TX: Analgesia PRN. Treatment/consult as indicated by studies.  Dispo: Pending studies. If studies WNL or pathology stabilized for discharge, discharge to f/u with PMD vs. orthopedics with precautions for RTED and supportive care recommendations.        Amount and/or Complexity of Data Reviewed  Radiology: ordered.    Risk  OTC drugs.                               Clinical Impression:   Final diagnoses:  [M25.561] Acute pain of right knee (Primary)        ED Disposition Condition    Eloped Stable                Efe Norris MD  10/12/23 5289

## 2023-10-16 ENCOUNTER — PATIENT MESSAGE (OUTPATIENT)
Dept: ORTHOPEDICS | Facility: CLINIC | Age: 58
End: 2023-10-16
Payer: MEDICAID

## 2023-10-16 ENCOUNTER — TELEPHONE (OUTPATIENT)
Dept: ORTHOPEDICS | Facility: CLINIC | Age: 58
End: 2023-10-16
Payer: MEDICAID

## 2023-10-16 NOTE — TELEPHONE ENCOUNTER
----- Message from Flores Carter sent at 10/16/2023 12:25 PM CDT -----  Contact: PATIENT  Hafsa Hawley  MRN: [id number]  : 1965  PCP: Cristobal Ann  Home Phone      [phone]  Work Phone      Not on file.  Mobile          [phone]      MESSAGE: Patient states that she has been to the emergency room several times for swelling to her right knee and would like to be seen ASAP.  She also feels that she might have fluid on it.      Phone: [phone]

## 2023-10-17 ENCOUNTER — TELEPHONE (OUTPATIENT)
Dept: FAMILY MEDICINE | Facility: CLINIC | Age: 58
End: 2023-10-17
Payer: MEDICAID

## 2023-10-17 NOTE — TELEPHONE ENCOUNTER
----- Message from Gustavo Allen sent at 10/17/2023  2:17 PM CDT -----  Contact: self  Hafsa Hawley  MRN: [id number]  : 1965  PCP: Cristobal Ann  Home Phone      [phone]  Work Phone      Not on file.  Mobile          [phone]      MESSAGE:   Patient needs a follow up apt, from going to [facility] neurologist. She has gout in her feet and knee. Patient is also saying she is still not using the bathroom right      [phone]

## 2023-10-18 ENCOUNTER — INFUSION (OUTPATIENT)
Dept: INFUSION THERAPY | Facility: HOSPITAL | Age: 58
End: 2023-10-18
Attending: STUDENT IN AN ORGANIZED HEALTH CARE EDUCATION/TRAINING PROGRAM
Payer: MEDICAID

## 2023-10-18 VITALS
TEMPERATURE: 97 F | RESPIRATION RATE: 20 BRPM | SYSTOLIC BLOOD PRESSURE: 119 MMHG | HEART RATE: 74 BPM | DIASTOLIC BLOOD PRESSURE: 73 MMHG

## 2023-10-18 DIAGNOSIS — E11.9 TYPE 2 DIABETES MELLITUS WITHOUT COMPLICATION, UNSPECIFIED WHETHER LONG TERM INSULIN USE: ICD-10-CM

## 2023-10-18 DIAGNOSIS — I42.9 CARDIOMYOPATHY, UNSPECIFIED TYPE: Primary | ICD-10-CM

## 2023-10-18 LAB
ALBUMIN SERPL BCP-MCNC: 3 G/DL (ref 3.5–5.2)
ALP SERPL-CCNC: 96 U/L (ref 55–135)
ALT SERPL W/O P-5'-P-CCNC: 16 U/L (ref 10–44)
ANION GAP SERPL CALC-SCNC: 15 MMOL/L (ref 8–16)
AST SERPL-CCNC: 15 U/L (ref 10–40)
BASOPHILS # BLD AUTO: 0.02 K/UL (ref 0–0.2)
BASOPHILS NFR BLD: 0.5 % (ref 0–1.9)
BILIRUB SERPL-MCNC: 0.7 MG/DL (ref 0.1–1)
BNP SERPL-MCNC: 295 PG/ML (ref 0–99)
BUN SERPL-MCNC: 59 MG/DL (ref 6–20)
CALCIUM SERPL-MCNC: 9.8 MG/DL (ref 8.7–10.5)
CHLORIDE SERPL-SCNC: 105 MMOL/L (ref 95–110)
CO2 SERPL-SCNC: 22 MMOL/L (ref 23–29)
CREAT SERPL-MCNC: 2.5 MG/DL (ref 0.5–1.4)
DIFFERENTIAL METHOD: ABNORMAL
EOSINOPHIL # BLD AUTO: 0.1 K/UL (ref 0–0.5)
EOSINOPHIL NFR BLD: 2.5 % (ref 0–8)
ERYTHROCYTE [DISTWIDTH] IN BLOOD BY AUTOMATED COUNT: 26 % (ref 11.5–14.5)
EST. GFR  (NO RACE VARIABLE): 22 ML/MIN/1.73 M^2
GLUCOSE SERPL-MCNC: 87 MG/DL (ref 70–110)
HCT VFR BLD AUTO: 26.3 % (ref 37–48.5)
HGB BLD-MCNC: 8.2 G/DL (ref 12–16)
IMM GRANULOCYTES # BLD AUTO: 0.03 K/UL (ref 0–0.04)
IMM GRANULOCYTES NFR BLD AUTO: 0.7 % (ref 0–0.5)
LYMPHOCYTES # BLD AUTO: 0.5 K/UL (ref 1–4.8)
LYMPHOCYTES NFR BLD: 11.1 % (ref 18–48)
MAGNESIUM SERPL-MCNC: 2.4 MG/DL (ref 1.6–2.6)
MCH RBC QN AUTO: 17.4 PG (ref 27–31)
MCHC RBC AUTO-ENTMCNC: 31.2 G/DL (ref 32–36)
MCV RBC AUTO: 56 FL (ref 82–98)
MONOCYTES # BLD AUTO: 0.4 K/UL (ref 0.3–1)
MONOCYTES NFR BLD: 8.8 % (ref 4–15)
NEUTROPHILS # BLD AUTO: 3.3 K/UL (ref 1.8–7.7)
NEUTROPHILS NFR BLD: 76.4 % (ref 38–73)
NRBC BLD-RTO: 0 /100 WBC
PHOSPHATE SERPL-MCNC: 3.4 MG/DL (ref 2.7–4.5)
PLATELET # BLD AUTO: 317 K/UL (ref 150–450)
PMV BLD AUTO: ABNORMAL FL (ref 9.2–12.9)
POTASSIUM SERPL-SCNC: 4.4 MMOL/L (ref 3.5–5.1)
PROT SERPL-MCNC: 7.7 G/DL (ref 6–8.4)
RBC # BLD AUTO: 4.72 M/UL (ref 4–5.4)
SODIUM SERPL-SCNC: 142 MMOL/L (ref 136–145)
WBC # BLD AUTO: 4.32 K/UL (ref 3.9–12.7)

## 2023-10-18 PROCEDURE — 85025 COMPLETE CBC W/AUTO DIFF WBC: CPT | Performed by: STUDENT IN AN ORGANIZED HEALTH CARE EDUCATION/TRAINING PROGRAM

## 2023-10-18 PROCEDURE — 83880 ASSAY OF NATRIURETIC PEPTIDE: CPT | Performed by: STUDENT IN AN ORGANIZED HEALTH CARE EDUCATION/TRAINING PROGRAM

## 2023-10-18 PROCEDURE — 83735 ASSAY OF MAGNESIUM: CPT | Performed by: STUDENT IN AN ORGANIZED HEALTH CARE EDUCATION/TRAINING PROGRAM

## 2023-10-18 PROCEDURE — 36415 COLL VENOUS BLD VENIPUNCTURE: CPT

## 2023-10-18 PROCEDURE — 80053 COMPREHEN METABOLIC PANEL: CPT | Performed by: STUDENT IN AN ORGANIZED HEALTH CARE EDUCATION/TRAINING PROGRAM

## 2023-10-18 PROCEDURE — 36415 COLL VENOUS BLD VENIPUNCTURE: CPT | Performed by: STUDENT IN AN ORGANIZED HEALTH CARE EDUCATION/TRAINING PROGRAM

## 2023-10-18 PROCEDURE — 84100 ASSAY OF PHOSPHORUS: CPT | Performed by: STUDENT IN AN ORGANIZED HEALTH CARE EDUCATION/TRAINING PROGRAM

## 2023-10-18 NOTE — PROGRESS NOTES
1115  Picc line dressing change done to R upper arm   No signs of infection  noted.  Biopatch, statlock and tegaderm dressing reapplied.  Tolerated well

## 2023-10-18 NOTE — PROGRESS NOTES
1110  unable to get blood return from PICC line    Line flushes easily   venistick done to L AC.

## 2023-10-19 NOTE — PROGRESS NOTES
Pt is unreachable. Encounter will be closed, and pt should be contacted if/when he/she comes back to the ER again for a F/U call.    Beulah Figueroa  ED Navigator- [place]  [phone]

## 2023-10-23 ENCOUNTER — INFUSION (OUTPATIENT)
Dept: INFUSION THERAPY | Facility: HOSPITAL | Age: 58
End: 2023-10-23
Attending: INTERNAL MEDICINE
Payer: MEDICAID

## 2023-10-23 ENCOUNTER — CLINICAL SUPPORT (OUTPATIENT)
Dept: CARDIOLOGY | Facility: HOSPITAL | Age: 58
End: 2023-10-23
Payer: MEDICAID

## 2023-10-23 VITALS
HEART RATE: 76 BPM | DIASTOLIC BLOOD PRESSURE: 72 MMHG | TEMPERATURE: 97 F | RESPIRATION RATE: 20 BRPM | SYSTOLIC BLOOD PRESSURE: 134 MMHG

## 2023-10-23 DIAGNOSIS — I42.9 CARDIOMYOPATHY, UNSPECIFIED TYPE: Primary | ICD-10-CM

## 2023-10-23 DIAGNOSIS — Z95.810 PRESENCE OF AUTOMATIC (IMPLANTABLE) CARDIAC DEFIBRILLATOR: ICD-10-CM

## 2023-10-23 LAB
ALBUMIN SERPL BCP-MCNC: 3.4 G/DL (ref 3.5–5.2)
ALP SERPL-CCNC: 74 U/L (ref 55–135)
ALT SERPL W/O P-5'-P-CCNC: 13 U/L (ref 10–44)
ANION GAP SERPL CALC-SCNC: 10 MMOL/L (ref 8–16)
ANISOCYTOSIS BLD QL SMEAR: ABNORMAL
AST SERPL-CCNC: 16 U/L (ref 10–40)
BASOPHILS # BLD AUTO: 0.02 K/UL (ref 0–0.2)
BASOPHILS NFR BLD: 0.4 % (ref 0–1.9)
BILIRUB SERPL-MCNC: 0.9 MG/DL (ref 0.1–1)
BNP SERPL-MCNC: 553 PG/ML (ref 0–99)
BUN SERPL-MCNC: 37 MG/DL (ref 6–20)
CALCIUM SERPL-MCNC: 9.3 MG/DL (ref 8.7–10.5)
CHLORIDE SERPL-SCNC: 107 MMOL/L (ref 95–110)
CO2 SERPL-SCNC: 23 MMOL/L (ref 23–29)
CREAT SERPL-MCNC: 2.3 MG/DL (ref 0.5–1.4)
DACRYOCYTES BLD QL SMEAR: ABNORMAL
DIFFERENTIAL METHOD: ABNORMAL
EOSINOPHIL # BLD AUTO: 0.1 K/UL (ref 0–0.5)
EOSINOPHIL NFR BLD: 1.8 % (ref 0–8)
ERYTHROCYTE [DISTWIDTH] IN BLOOD BY AUTOMATED COUNT: 27.5 % (ref 11.5–14.5)
EST. GFR  (NO RACE VARIABLE): 24 ML/MIN/1.73 M^2
GLUCOSE SERPL-MCNC: 116 MG/DL (ref 70–110)
HCT VFR BLD AUTO: 25.3 % (ref 37–48.5)
HGB BLD-MCNC: 7.8 G/DL (ref 12–16)
HYPOCHROMIA BLD QL SMEAR: ABNORMAL
IMM GRANULOCYTES # BLD AUTO: 0.04 K/UL (ref 0–0.04)
IMM GRANULOCYTES NFR BLD AUTO: 0.8 % (ref 0–0.5)
LYMPHOCYTES # BLD AUTO: 0.7 K/UL (ref 1–4.8)
LYMPHOCYTES NFR BLD: 13 % (ref 18–48)
MAGNESIUM SERPL-MCNC: 2 MG/DL (ref 1.6–2.6)
MCH RBC QN AUTO: 17.2 PG (ref 27–31)
MCHC RBC AUTO-ENTMCNC: 30.8 G/DL (ref 32–36)
MCV RBC AUTO: 56 FL (ref 82–98)
MONOCYTES # BLD AUTO: 0.4 K/UL (ref 0.3–1)
MONOCYTES NFR BLD: 8.5 % (ref 4–15)
NEUTROPHILS # BLD AUTO: 3.8 K/UL (ref 1.8–7.7)
NEUTROPHILS NFR BLD: 75.5 % (ref 38–73)
NRBC BLD-RTO: 1 /100 WBC
OVALOCYTES BLD QL SMEAR: ABNORMAL
PHOSPHATE SERPL-MCNC: 3.2 MG/DL (ref 2.7–4.5)
PLATELET # BLD AUTO: 314 K/UL (ref 150–450)
PLATELET BLD QL SMEAR: ABNORMAL
PMV BLD AUTO: ABNORMAL FL (ref 9.2–12.9)
POIKILOCYTOSIS BLD QL SMEAR: ABNORMAL
POLYCHROMASIA BLD QL SMEAR: ABNORMAL
POTASSIUM SERPL-SCNC: 4.2 MMOL/L (ref 3.5–5.1)
PROT SERPL-MCNC: 7 G/DL (ref 6–8.4)
RBC # BLD AUTO: 4.53 M/UL (ref 4–5.4)
SCHISTOCYTES BLD QL SMEAR: PRESENT
SODIUM SERPL-SCNC: 140 MMOL/L (ref 136–145)
SPHEROCYTES BLD QL SMEAR: ABNORMAL
WBC # BLD AUTO: 5.06 K/UL (ref 3.9–12.7)

## 2023-10-23 PROCEDURE — 80053 COMPREHEN METABOLIC PANEL: CPT | Performed by: STUDENT IN AN ORGANIZED HEALTH CARE EDUCATION/TRAINING PROGRAM

## 2023-10-23 PROCEDURE — 36591 DRAW BLOOD OFF VENOUS DEVICE: CPT

## 2023-10-23 PROCEDURE — 85025 COMPLETE CBC W/AUTO DIFF WBC: CPT | Performed by: STUDENT IN AN ORGANIZED HEALTH CARE EDUCATION/TRAINING PROGRAM

## 2023-10-23 PROCEDURE — 84100 ASSAY OF PHOSPHORUS: CPT | Performed by: STUDENT IN AN ORGANIZED HEALTH CARE EDUCATION/TRAINING PROGRAM

## 2023-10-23 PROCEDURE — 83880 ASSAY OF NATRIURETIC PEPTIDE: CPT | Performed by: STUDENT IN AN ORGANIZED HEALTH CARE EDUCATION/TRAINING PROGRAM

## 2023-10-23 PROCEDURE — 93296 REM INTERROG EVL PM/IDS: CPT | Performed by: INTERNAL MEDICINE

## 2023-10-23 PROCEDURE — 83735 ASSAY OF MAGNESIUM: CPT | Performed by: STUDENT IN AN ORGANIZED HEALTH CARE EDUCATION/TRAINING PROGRAM

## 2023-10-23 PROCEDURE — 36415 COLL VENOUS BLD VENIPUNCTURE: CPT | Performed by: STUDENT IN AN ORGANIZED HEALTH CARE EDUCATION/TRAINING PROGRAM

## 2023-10-23 NOTE — PROGRESS NOTES
1215   Right upper arm PICC line dressing changed using sterile technique.  Bio patch, stat lock device and tegaderm dressing applied   Tolerated well   dc to home in stable condition

## 2023-10-24 ENCOUNTER — PATIENT MESSAGE (OUTPATIENT)
Dept: FAMILY MEDICINE | Facility: CLINIC | Age: 58
End: 2023-10-24
Payer: MEDICAID

## 2023-10-24 ENCOUNTER — OFFICE VISIT (OUTPATIENT)
Dept: ORTHOPEDICS | Facility: CLINIC | Age: 58
End: 2023-10-24
Payer: MEDICAID

## 2023-10-24 VITALS
WEIGHT: 184 LBS | HEIGHT: 63 IN | BODY MASS INDEX: 32.6 KG/M2 | DIASTOLIC BLOOD PRESSURE: 82 MMHG | SYSTOLIC BLOOD PRESSURE: 132 MMHG | OXYGEN SATURATION: 98 % | RESPIRATION RATE: 18 BRPM | HEART RATE: 86 BPM

## 2023-10-24 DIAGNOSIS — M1A.0610 CHRONIC GOUT OF RIGHT KNEE, UNSPECIFIED CAUSE: ICD-10-CM

## 2023-10-24 DIAGNOSIS — M25.561 CHRONIC PAIN OF RIGHT KNEE: ICD-10-CM

## 2023-10-24 DIAGNOSIS — I13.0 HYPERTENSIVE CARDIOVASCULAR-RENAL DISEASE, STAGE 1-4 OR UNSPECIFIED CHRONIC KIDNEY DISEASE, WITH HEART FAILURE: ICD-10-CM

## 2023-10-24 DIAGNOSIS — I42.8 NICM (NONISCHEMIC CARDIOMYOPATHY): Primary | Chronic | ICD-10-CM

## 2023-10-24 DIAGNOSIS — I50.42 CHRONIC COMBINED SYSTOLIC AND DIASTOLIC HEART FAILURE: Chronic | ICD-10-CM

## 2023-10-24 DIAGNOSIS — G89.29 CHRONIC PAIN OF RIGHT KNEE: ICD-10-CM

## 2023-10-24 DIAGNOSIS — M25.461 EFFUSION OF RIGHT KNEE: Primary | ICD-10-CM

## 2023-10-24 LAB
APPEARANCE FLD: NORMAL
BODY FLD TYPE: NORMAL
BODY FLD TYPE: NORMAL
COLOR FLD: NORMAL
CRYSTALS FLD MICRO: NEGATIVE
LEFT EYE DM RETINOPATHY: NEGATIVE
LYMPHOCYTES NFR FLD MANUAL: 34 %
MONOS+MACROS NFR FLD MANUAL: 29 %
NEUTROPHILS NFR FLD MANUAL: 37 %
PATH INTERP FLD-IMP: NORMAL
RIGHT EYE DM RETINOPATHY: NEGATIVE
WBC # FLD: 398 /CU MM

## 2023-10-24 PROCEDURE — 89060 EXAM SYNOVIAL FLUID CRYSTALS: CPT | Performed by: PHYSICIAN ASSISTANT

## 2023-10-24 PROCEDURE — 20610 DRAIN/INJ JOINT/BURSA W/O US: CPT | Mod: PBBFAC | Performed by: PHYSICIAN ASSISTANT

## 2023-10-24 PROCEDURE — 87070 CULTURE OTHR SPECIMN AEROBIC: CPT | Performed by: PHYSICIAN ASSISTANT

## 2023-10-24 PROCEDURE — 3066F PR DOCUMENTATION OF TREATMENT FOR NEPHROPATHY: ICD-10-PCS | Mod: CPTII,,, | Performed by: PHYSICIAN ASSISTANT

## 2023-10-24 PROCEDURE — 99214 PR OFFICE/OUTPT VISIT, EST, LEVL IV, 30-39 MIN: ICD-10-PCS | Mod: S$PBB,25,, | Performed by: PHYSICIAN ASSISTANT

## 2023-10-24 PROCEDURE — 1160F PR REVIEW ALL MEDS BY PRESCRIBER/CLIN PHARMACIST DOCUMENTED: ICD-10-PCS | Mod: CPTII,,, | Performed by: PHYSICIAN ASSISTANT

## 2023-10-24 PROCEDURE — 4010F ACE/ARB THERAPY RXD/TAKEN: CPT | Mod: CPTII,,, | Performed by: PHYSICIAN ASSISTANT

## 2023-10-24 PROCEDURE — 3008F PR BODY MASS INDEX (BMI) DOCUMENTED: ICD-10-PCS | Mod: CPTII,,, | Performed by: PHYSICIAN ASSISTANT

## 2023-10-24 PROCEDURE — 99999 PR PBB SHADOW E&M-EST. PATIENT-LVL V: CPT | Mod: PBBFAC,,, | Performed by: PHYSICIAN ASSISTANT

## 2023-10-24 PROCEDURE — 89051 BODY FLUID CELL COUNT: CPT | Performed by: PHYSICIAN ASSISTANT

## 2023-10-24 PROCEDURE — 87070 CULTURE OTHR SPECIMN AEROBIC: CPT | Mod: 59 | Performed by: PHYSICIAN ASSISTANT

## 2023-10-24 PROCEDURE — 99999 PR PBB SHADOW E&M-EST. PATIENT-LVL V: ICD-10-PCS | Mod: PBBFAC,,, | Performed by: PHYSICIAN ASSISTANT

## 2023-10-24 PROCEDURE — 1159F PR MEDICATION LIST DOCUMENTED IN MEDICAL RECORD: ICD-10-PCS | Mod: CPTII,,, | Performed by: PHYSICIAN ASSISTANT

## 2023-10-24 PROCEDURE — 3008F BODY MASS INDEX DOCD: CPT | Mod: CPTII,,, | Performed by: PHYSICIAN ASSISTANT

## 2023-10-24 PROCEDURE — 1159F MED LIST DOCD IN RCRD: CPT | Mod: CPTII,,, | Performed by: PHYSICIAN ASSISTANT

## 2023-10-24 PROCEDURE — 99215 OFFICE O/P EST HI 40 MIN: CPT | Mod: PBBFAC,25 | Performed by: PHYSICIAN ASSISTANT

## 2023-10-24 PROCEDURE — 3075F SYST BP GE 130 - 139MM HG: CPT | Mod: CPTII,,, | Performed by: PHYSICIAN ASSISTANT

## 2023-10-24 PROCEDURE — 3079F PR MOST RECENT DIASTOLIC BLOOD PRESSURE 80-89 MM HG: ICD-10-PCS | Mod: CPTII,,, | Performed by: PHYSICIAN ASSISTANT

## 2023-10-24 PROCEDURE — 99214 OFFICE O/P EST MOD 30 MIN: CPT | Mod: S$PBB,25,, | Performed by: PHYSICIAN ASSISTANT

## 2023-10-24 PROCEDURE — 87205 SMEAR GRAM STAIN: CPT | Performed by: PHYSICIAN ASSISTANT

## 2023-10-24 PROCEDURE — 3044F HG A1C LEVEL LT 7.0%: CPT | Mod: CPTII,,, | Performed by: PHYSICIAN ASSISTANT

## 2023-10-24 PROCEDURE — 3066F NEPHROPATHY DOC TX: CPT | Mod: CPTII,,, | Performed by: PHYSICIAN ASSISTANT

## 2023-10-24 PROCEDURE — 3079F DIAST BP 80-89 MM HG: CPT | Mod: CPTII,,, | Performed by: PHYSICIAN ASSISTANT

## 2023-10-24 PROCEDURE — 3044F PR MOST RECENT HEMOGLOBIN A1C LEVEL <7.0%: ICD-10-PCS | Mod: CPTII,,, | Performed by: PHYSICIAN ASSISTANT

## 2023-10-24 PROCEDURE — 3075F PR MOST RECENT SYSTOLIC BLOOD PRESS GE 130-139MM HG: ICD-10-PCS | Mod: CPTII,,, | Performed by: PHYSICIAN ASSISTANT

## 2023-10-24 PROCEDURE — 20610 PR DRAIN/INJECT LARGE JOINT/BURSA: ICD-10-PCS | Mod: S$PBB,RT,, | Performed by: PHYSICIAN ASSISTANT

## 2023-10-24 PROCEDURE — 20610 DRAIN/INJ JOINT/BURSA W/O US: CPT | Mod: S$PBB,RT,, | Performed by: PHYSICIAN ASSISTANT

## 2023-10-24 PROCEDURE — 1160F RVW MEDS BY RX/DR IN RCRD: CPT | Mod: CPTII,,, | Performed by: PHYSICIAN ASSISTANT

## 2023-10-24 PROCEDURE — 4010F PR ACE/ARB THEARPY RXD/TAKEN: ICD-10-PCS | Mod: CPTII,,, | Performed by: PHYSICIAN ASSISTANT

## 2023-10-24 NOTE — PROGRESS NOTES
[facility] Hospital Medicine  Progress Note    Patient Name: Hafsa Hawley  MRN: [id number]  Patient Class: IP- Inpatient   Admission Date: 9/3/2020  Length of Stay: 1 days  Attending Physician: Burke Shah MD  Primary Care Provider: Jamilah Burch MD        Subjective:     Principal Problem:NSTEMI (non-ST elevated myocardial infarction)        HPI:  Ms. Hawley is a 54 y.o. female with DM, CKDIII, SVT (RFA 5/2019), NICM, CHF (last EF 35-40% 9/2019) has ICD, HTN, MELISSA, paroxysmal atrial fibrillation (on eliquis), Fibromyalgia presents to the emergency department with weakness, fatigue, shortness of breath.  Patient was recently diagnosed with COVID on 08/12/2020 [facility] was admitted for 4 days and discharged to self quarantine.  Since COVID diagnosis patient states that she has continued to have fatigue and shortness of breath with additional complaint nonspecific and atypical epigastric discomfort and sternal pressure.  She reports that the symptoms have progressively worsened over the last couple days and has also noticed arise in her blood pressure.  This prompted patient to come to the emergency department.  She denies any fever, chills, cough, nausea vomiting.  Patient has longstanding cardiac history and has been followed by cardiologist who she recently saw on 08/26/2020.     In the ED vital signs are stable.  Troponin found to be 0.523.  .  CMP showing CKD 3.  CBC showing microcytic anemia.  EKG showing sinus tachycardia with left axis deviation and left ventricular hypertrophy no STEMI.  Patient was given aspirin in the ED and started on heparin drip for NSTEMI.     During rounds, patient endorsed extra socioeconomic stressors she has been dealing with recently. Patient was very tearful reporting she worries a lot about her health and how to pay for expenses while taking care of her 17 yo granddaughter. Pt reports lack of sleep and feelings of  guilt.     Overview/Hospital Course:  Per EMR and EKG, patient has ongoing issues of SVT. She is followed closely with a cardiologist who is aware of these issues. Per Cards, elevated trops is at her baseline and EKG showed no changes. Heparin drip stopped. Cards signed off.   Pt reports feeling better. With the stressors and insomnia, started patient on trazadone. Repeat troponin on 9/4 trending down.     Interval History: She experienced an isolated episode of stabbing chest pain overnight that aroused her from sleep, but has since resolved. She notes that she was unable to sleep throughout the night despite being given Trazadone 50mg. She endorse that she has been extremely anxious regarding financial stresses, and her current health. She experienced nausea overnight and this morning and was given Zofran will mild relieve. She was able to tolerate breakfast and lunch without vomiting. Remains afebrile.     Review of Systems   Constitutional: Negative.    Eyes: Negative.    Respiratory: Negative for shortness of breath.    Cardiovascular: Negative for chest pain.   Gastrointestinal: Positive for nausea.   Endocrine: Negative.    Genitourinary: Negative.    Musculoskeletal: Negative.    Skin: Negative.    Allergic/Immunologic: Negative.    Neurological: Negative.    Hematological: Negative.    Psychiatric/Behavioral: Negative for agitation. The patient is nervous/anxious.      Objective:     Vital Signs (Most Recent):  Temp: 96.4 °F (35.8 °C) (09/04/20 1125)  Pulse: 94 (09/04/20 1140)  Resp: 17 (09/04/20 1125)  BP: 117/63 (09/04/20 1125)  SpO2: 98 % (09/04/20 1125) Vital Signs (24h Range):  Temp:  [96 °F (35.6 °C)-98 °F (36.7 °C)] 96.4 °F (35.8 °C)  Pulse:  [] 94  Resp:  [16-22] 17  SpO2:  [94 %-98 %] 98 %  BP: (107-149)/(51-90) 117/63     Weight: 88 kg (194 lb 0.1 oz)  Body mass index is 33.3 kg/m².  No intake or output data in the 24 hours ending 09/04/20 1335   Physical Exam  Vitals signs and nursing note  reviewed.   Constitutional:       Appearance: Normal appearance. She is obese. She is not toxic-appearing.   HENT:      Head: Normocephalic and atraumatic.   Eyes:      General: No scleral icterus.  Cardiovascular:      Rate and Rhythm: Normal rate.      Pulses: Normal pulses.   Pulmonary:      Breath sounds: Normal breath sounds.   Abdominal:      General: Bowel sounds are normal. There is no distension.      Tenderness: There is no right CVA tenderness or left CVA tenderness.   Musculoskeletal:         General: No swelling.   Skin:     Capillary Refill: Capillary refill takes less than 2 seconds.   Neurological:      Mental Status: She is alert and oriented to person, place, and time.   Psychiatric:         Mood and Affect: Mood is anxious. Affect is tearful.         Thought Content: Thought content normal.         Significant Labs:   A1C:   Recent Labs   Lab 08/13/20  0828 09/03/20  0535   HGBA1C 7.0* 7.8*     CBC:   Recent Labs   Lab 09/03/20  0102 09/04/20  0516   WBC 6.10 4.62   HGB 8.8* 8.0*   HCT 29.8* 27.6*   * 111*     CMP:   Recent Labs   Lab 09/03/20  0102 09/04/20  0516    140   K 4.1 4.4    108   CO2 21* 21*   * 170*   BUN 37* 36*   CREATININE 1.7* 1.6*   CALCIUM 8.4* 8.4*   PROT 6.2 5.7*   ALBUMIN 3.1* 2.7*   BILITOT 0.9 0.9   ALKPHOS 64 56   AST 27 20   ALT 21 17   ANIONGAP 13 11   EGFRNONAA 33* 36*     Cardiac Markers:   Recent Labs   Lab 09/03/20  0102   *     Magnesium:   Recent Labs   Lab 09/03/20  0437 09/04/20  0516   MG 1.3*  1.3* 1.8     Troponin:   Recent Labs   Lab 09/03/20  1219 09/03/20  1945 09/04/20  1140   TROPONINI 0.525* 0.501* 0.420*        Significant Imaging: I have reviewed all pertinent imaging results/findings within the past 24 hours.      Assessment/Plan:      Type 2 diabetes mellitus with hyperglycemia, without long-term current use of insulin  New Dx as last hospitalization in August  Last A1c 7.0 3 weeks ago  Home meds: Metformin and  glimepiride   Moderate SSI on        ICD (implantable cardioverter-defibrillator) in place  ICD implanted 12/2/16 (VDD single pass lead).    Chronic kidney disease, stage III (moderate)  Cr 1.7 GFR 39  Baseline Cr used to be 1-1.2, new baseline as of recent CKD III range      Chronic combined systolic and diastolic heart failure  Last EF 35-40% about one year ago  ICD in place  On Lasix, metoprolol succinate, Entresto    COPD (chronic obstructive pulmonary disease)  No acute issues  Can continue home inhalers if needed      NICM (nonischemic cardiomyopathy)  Primary cardiologist is Dr. Cortez.  Longstanding history of non-ischemic cardiomyopathy  home meds include: Eliquis, Statin, Entresto, metoprolol succinate        Paroxysmal atrial fibrillation  On eliquis at home for CVA ppx, Coreg recently stopped and Cardiology added metoprolol succinate 25mg BID    Holding as currently NPO, on heparin drip with NSTEMI, awaiting Cards consult       MELISSA (obstructive sleep apnea)  Noncompliant with CPAP    Essential hypertension  Controlled with home medications       Microcytic anemia  Looks to be chronic per chart  H/H on admission 8.8/29.8   Iron studies pending         VTE Risk Mitigation (From admission, onward)         Ordered     IP VTE HIGH RISK PATIENT  Once      09/03/20 0400     Place sequential compression device  Until discontinued      09/03/20 0400                Discharge Planning   CHIQUI: 9/4/2020     Code Status: Full Code   Is the patient medically ready for discharge?:     Reason for patient still in hospital (select all that apply): Treatment  Discharge Plan A: Home   Discharge Delays: (!) Personal Transportation([place] called [employer] for tranportation to . [employer]# [id number])              Alvin Ferguson MD -II  Department of Hospital Medicine   [facility]     Excisional Biopsy Additional Text (Leave Blank If You Do Not Want): The margin was drawn around the clinically apparent lesion. An elliptical shape was then drawn on the skin incorporating the lesion and margins.  Incisions were then made along these lines to the appropriate tissue plane and the lesion was extirpated.

## 2023-10-24 NOTE — PROGRESS NOTES
"Subjective:      Patient ID: Hafsa Hawley is a 58 y.o. female.    Chief Complaint: Pain of the Right Knee    Review of patient's allergies indicates:   Allergen Reactions    Penicillins Hives and Other (See Comments)    Iodinated contrast media Nausea And Vomiting    Oxycodone-acetaminophen Other (See Comments)     Nausea, Dizziness, Anxiety.  "I don't like how it makes me feel."   Given Hydromorphone 0.5mg IVP  Without problems.  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 Metabolism of other drugs not listed here may also be impacted by UGT1A1 enzyme activity.       Tramadol Nausea And Vomiting and Other (See Comments)     Other reaction(s): Other (See Comments)    Valsartan Other (See Comments)      Pt returns to clinic with c/o right knee pain.  At last visit knee was aspirated and fluid came back positive for gout.  She is now on allopurinol daily as prescribed by PCP.  She was referred to rheumatology at that visit but has not heard from anyone to schedule appointment.  Today she reports that knee has been swollen and painful for a few weeks now, similar to previous.  No redness or warmth.    5/9/23:  58 yo F presents to clinic with c/o right knee pain and swelling x few months.  No injury or trauma.  No redness or warmth.  Diffuse knee pain, described as achy.  Worse with bending knee and walking and improves with rest.  Pt reports history of gout, she wonders " if gout is causing her knee to swell.        Review of Systems   Constitutional: Negative for chills, diaphoresis and fever.   HENT:  Negative for congestion, ear discharge and ear pain.    Eyes:  Negative for blurred vision, discharge, double vision and pain.   Cardiovascular:  Negative for chest pain, claudication and cyanosis.   Respiratory:  Negative for cough, hemoptysis and shortness of breath.    Endocrine: Negative for cold intolerance and heat intolerance.   Skin:  Negative for color change, dry skin, itching and rash.   Musculoskeletal:  Positive for joint pain and joint swelling. Negative for arthritis, back pain, falls, gout, muscle weakness and neck pain.   Gastrointestinal:  Negative for abdominal pain and change in bowel habit.   Neurological:  Negative for brief paralysis, disturbances in coordination and dizziness.   Psychiatric/Behavioral:  Negative for altered mental status and depression.          Objective:          General    Constitutional: She is oriented to person, place, and time. She appears well-developed and well-nourished. No distress.   HENT:   Head: Atraumatic.   Eyes: EOM are normal. Right eye exhibits no discharge. Left eye exhibits no discharge.   Cardiovascular:  Normal rate.            Pulmonary/Chest: Effort normal. No respiratory distress.   Abdominal: Soft.   Neurological: She is alert and oriented to person, place, and time.   Psychiatric: She has a normal mood and affect. Her behavior is normal.           Right Knee Exam     Inspection   Erythema: absent  Scars: absent  Swelling: present  Effusion: present  Deformity: absent  Bruising: absent    Tenderness   The patient is tender to palpation of the medial joint line and lateral joint line.    Range of Motion   Extension:  normal   Flexion:  abnormal     Tests   Meniscus   Greg:  Medial - negative Lateral - negative  Ligament Examination   Lachman: normal (-1 to 2mm)   PCL-Posterior Drawer: normal (0 to 2mm)     MCL -  Valgus: normal (0 to 2mm)  LCL - Varus: normal    Other   Sensation: normal    Left Knee Exam     Inspection   Erythema: absent  Scars: absent  Swelling: absent  Effusion: absent  Deformity: absent  Bruising: absent    Range of Motion   Extension:  normal   Flexion:  normal     Tests   Meniscus   Greg:  Medial - negative Lateral - negative  Stability   Lachman: normal (-1 to 2mm)   PCL-Posterior Drawer: normal (0 to 2mm)  MCL - Valgus: normal (0 to 2mm)  LCL - Varus: normal (0 to 2mm)    Other   Sensation: normal    Vascular Exam       Edema  Right Lower Leg: absent  Left Lower Leg: absent              Assessment:         Xray Right Knee 5/9/23:  Right: No fracture or dislocation.  There is a moderate to large suprapatellar joint effusion.  Cartilage spaces are maintained.     Left: No fracture or dislocation on provided views.  Cartilage spaces are maintained.      Encounter Diagnosis   Name Primary?    Effusion of right knee Yes    Effusion of right knee  -     CULTURE, FLUID  (AEROBIC)  -     WBC & Diff,Body Fluid Joint Fluid, Right Knee  -     Culture, Body Fluid - Bactec  -     Body fluid crystal Joint Fluid, Right Knee               Plan:         I made the decision to obtain old records of the patient including previous notes and imaging. New imaging was ordered today of the extremity or extremities evaluated.     The total face-to-face encounter time with this patient was 30 minutes and greater than 50% of of the encounter time was spent counseling the patient, coordinating care, and education regarding the pathology and natural history of his diagnosis. We have discussed a variety of treatment options including medications, injections, physical therapy and other alternative treatments. Pt is requesting knee aspiration at this time.  She is also asking for referral to podiatry for bilateral foot pain.    Aspiration:  A time out was performed, including verification of patient ID, procedure, site and  side, availability of information and equipment, review of safety issues, and agreement with consent, the procedure site was marked.  After time out was performed, the patient was prepped aseptically with chloraprep swabsticks. 44 cc's of cloudy yellow joint fluid was aspirated from the Superolateral  aspect of the right Knee Joint using a 18g x 1.5 needle in sterile fashion without complication.   Ace wrap was applied to knee following aspiration.  Fluid sent to lab for analysis.    2. Knee conditioning home exercises.  3. Continue voltaren gel topically as prescribed by PCP as needed.  4. Ice compress to the affected area 2-3x a day for 15-20 minutes as needed for pain management.  5. Referral to rheumatology for further management of gout, follow up with PCP while waiting for appointment. New referral sent to [place] [facility].  6. RTC as needed.      Patient voices understanding of and agreement with treatment plan. All of the patient's questions were answered and the patient will contact us if she has any questions or concerns in the interim.

## 2023-10-25 ENCOUNTER — PES CALL (OUTPATIENT)
Dept: HOME HEALTH SERVICES | Facility: CLINIC | Age: 58
End: 2023-10-25
Payer: MEDICAID

## 2023-10-26 ENCOUNTER — OFFICE VISIT (OUTPATIENT)
Dept: FAMILY MEDICINE | Facility: CLINIC | Age: 58
End: 2023-10-26
Payer: MEDICAID

## 2023-10-26 ENCOUNTER — TELEPHONE (OUTPATIENT)
Dept: ORTHOPEDICS | Facility: CLINIC | Age: 58
End: 2023-10-26
Payer: MEDICAID

## 2023-10-26 VITALS
BODY MASS INDEX: 29.34 KG/M2 | RESPIRATION RATE: 16 BRPM | DIASTOLIC BLOOD PRESSURE: 74 MMHG | HEIGHT: 66 IN | SYSTOLIC BLOOD PRESSURE: 120 MMHG | WEIGHT: 182.56 LBS | HEART RATE: 76 BPM

## 2023-10-26 DIAGNOSIS — M77.52 BURSITIS OF LEFT FOOT: ICD-10-CM

## 2023-10-26 DIAGNOSIS — M10.9 GOUT, UNSPECIFIED CAUSE, UNSPECIFIED CHRONICITY, UNSPECIFIED SITE: Primary | ICD-10-CM

## 2023-10-26 PROCEDURE — 99999 PR PBB SHADOW E&M-EST. PATIENT-LVL V: CPT | Mod: PBBFAC,,, | Performed by: FAMILY MEDICINE

## 2023-10-26 PROCEDURE — 99214 PR OFFICE/OUTPT VISIT, EST, LEVL IV, 30-39 MIN: ICD-10-PCS | Mod: S$PBB,,, | Performed by: FAMILY MEDICINE

## 2023-10-26 PROCEDURE — 99999 PR PBB SHADOW E&M-EST. PATIENT-LVL V: ICD-10-PCS | Mod: PBBFAC,,, | Performed by: FAMILY MEDICINE

## 2023-10-26 PROCEDURE — 4010F PR ACE/ARB THEARPY RXD/TAKEN: ICD-10-PCS | Mod: CPTII,,, | Performed by: FAMILY MEDICINE

## 2023-10-26 PROCEDURE — 3066F PR DOCUMENTATION OF TREATMENT FOR NEPHROPATHY: ICD-10-PCS | Mod: CPTII,,, | Performed by: FAMILY MEDICINE

## 2023-10-26 PROCEDURE — 99215 OFFICE O/P EST HI 40 MIN: CPT | Mod: PBBFAC | Performed by: FAMILY MEDICINE

## 2023-10-26 PROCEDURE — 3044F PR MOST RECENT HEMOGLOBIN A1C LEVEL <7.0%: ICD-10-PCS | Mod: CPTII,,, | Performed by: FAMILY MEDICINE

## 2023-10-26 PROCEDURE — 99214 OFFICE O/P EST MOD 30 MIN: CPT | Mod: S$PBB,,, | Performed by: FAMILY MEDICINE

## 2023-10-26 PROCEDURE — 3066F NEPHROPATHY DOC TX: CPT | Mod: CPTII,,, | Performed by: FAMILY MEDICINE

## 2023-10-26 PROCEDURE — 3044F HG A1C LEVEL LT 7.0%: CPT | Mod: CPTII,,, | Performed by: FAMILY MEDICINE

## 2023-10-26 PROCEDURE — 3074F PR MOST RECENT SYSTOLIC BLOOD PRESSURE < 130 MM HG: ICD-10-PCS | Mod: CPTII,,, | Performed by: FAMILY MEDICINE

## 2023-10-26 PROCEDURE — 3074F SYST BP LT 130 MM HG: CPT | Mod: CPTII,,, | Performed by: FAMILY MEDICINE

## 2023-10-26 PROCEDURE — 4010F ACE/ARB THERAPY RXD/TAKEN: CPT | Mod: CPTII,,, | Performed by: FAMILY MEDICINE

## 2023-10-26 PROCEDURE — 1159F MED LIST DOCD IN RCRD: CPT | Mod: CPTII,,, | Performed by: FAMILY MEDICINE

## 2023-10-26 PROCEDURE — 1159F PR MEDICATION LIST DOCUMENTED IN MEDICAL RECORD: ICD-10-PCS | Mod: CPTII,,, | Performed by: FAMILY MEDICINE

## 2023-10-26 PROCEDURE — 3078F DIAST BP <80 MM HG: CPT | Mod: CPTII,,, | Performed by: FAMILY MEDICINE

## 2023-10-26 PROCEDURE — 3078F PR MOST RECENT DIASTOLIC BLOOD PRESSURE < 80 MM HG: ICD-10-PCS | Mod: CPTII,,, | Performed by: FAMILY MEDICINE

## 2023-10-26 RX ORDER — PROBENECID 500 MG/1
TABLET, FILM COATED ORAL
Qty: 60 TABLET | Refills: 0 | Status: SHIPPED | OUTPATIENT
Start: 2023-10-26 | End: 2023-11-02

## 2023-10-26 RX ORDER — FEBUXOSTAT 40 MG/1
40 TABLET, FILM COATED ORAL DAILY
Qty: 30 TABLET | Refills: 2 | Status: SHIPPED | OUTPATIENT
Start: 2023-10-26 | End: 2023-10-26

## 2023-10-26 RX ORDER — HYDROCODONE BITARTRATE AND ACETAMINOPHEN 10; 325 MG/1; MG/1
1 TABLET ORAL EVERY 8 HOURS PRN
Qty: 90 TABLET | Refills: 0 | OUTPATIENT
Start: 2023-10-26

## 2023-10-26 RX ORDER — ONDANSETRON 4 MG/1
4 TABLET, ORALLY DISINTEGRATING ORAL EVERY 8 HOURS PRN
COMMUNITY
Start: 2023-10-13 | End: 2024-01-09

## 2023-10-26 RX ORDER — EMPAGLIFLOZIN 25 MG/1
25 TABLET, FILM COATED ORAL
COMMUNITY
Start: 2023-10-17

## 2023-10-26 NOTE — TELEPHONE ENCOUNTER
Called the to let her know that Ms Wendie Michel said that the fluid came back negative for infection or crystals that she advises to go to the er if the pain gets any worst and that she would be out of clinic until Tuesday . Pt voiced understanding

## 2023-10-26 NOTE — TELEPHONE ENCOUNTER
LOV 10/05/2023    patient requesting refill for   HYDROcodone-acetaminophen (NORCO)  mg per tablet 90 tablet           RX pending   pharmacy confirmed  please advise

## 2023-10-26 NOTE — TELEPHONE ENCOUNTER
Care Due:                  Date            Visit Type   Department     Provider  --------------------------------------------------------------------------------                                [facility]                              PRIMARY      [facility] FAMILY    Cristobal Jj  Last Visit: 08-      CARE ([facility])   MEDICINE       Juaquin  Next Visit: None Scheduled  None         None Found                                                            Last  Test          Frequency    Reason                     Performed    Due Date  --------------------------------------------------------------------------------    Lipid Panel.  12 months..  atorvastatin.............  01- 12-    [facility] Embedded Care Due Messages. Reference number: [id number].   10/26/2023 11:21:04 AM CDT

## 2023-10-26 NOTE — TELEPHONE ENCOUNTER
----- Message from Wendie Michel PA-C sent at 10/26/2023  3:36 PM CDT -----  Contact: PATIENT  Fluid came back negative for infection or crystals.  I will be out of clinic until Tuesday, if pain is worse then she should go to ER.    ----- Message -----  From: Taylor Dominguez MA  Sent: 10/26/2023   2:59 PM CDT  To: Wendie Michel PA-C      ----- Message -----  From: Flores Carter  Sent: 10/26/2023   1:59 PM CDT  To: Anand Pressley Staff    Hafsa Hawley  MRN: [id number]  : 1965  PCP: Cristobal Ann  Home Phone      [phone]  Work Phone      Not on file.  Mobile          [phone]      MESSAGE: Patient is calling for results of fluid that was drained off her knee at her last office visit.  She said Wendie was sending it off.  She states that her knee is hurting worse than it was before the fluid was drained.      Phone: [phone]

## 2023-10-27 ENCOUNTER — OFFICE VISIT (OUTPATIENT)
Dept: PALLIATIVE MEDICINE | Facility: CLINIC | Age: 58
End: 2023-10-27
Payer: MEDICAID

## 2023-10-27 DIAGNOSIS — R52 PAIN: ICD-10-CM

## 2023-10-27 DIAGNOSIS — M10.9 GOUT, ARTHRITIS: ICD-10-CM

## 2023-10-27 DIAGNOSIS — Z51.5 PALLIATIVE CARE ENCOUNTER: ICD-10-CM

## 2023-10-27 DIAGNOSIS — G62.9 NEUROPATHY: ICD-10-CM

## 2023-10-27 DIAGNOSIS — I50.9 CONGESTIVE HEART FAILURE, UNSPECIFIED HF CHRONICITY, UNSPECIFIED HEART FAILURE TYPE: Primary | ICD-10-CM

## 2023-10-27 DIAGNOSIS — R06.00 DYSPNEA, UNSPECIFIED TYPE: ICD-10-CM

## 2023-10-27 PROCEDURE — 3066F NEPHROPATHY DOC TX: CPT | Mod: CPTII,95,, | Performed by: STUDENT IN AN ORGANIZED HEALTH CARE EDUCATION/TRAINING PROGRAM

## 2023-10-27 PROCEDURE — 3066F PR DOCUMENTATION OF TREATMENT FOR NEPHROPATHY: ICD-10-PCS | Mod: CPTII,95,, | Performed by: STUDENT IN AN ORGANIZED HEALTH CARE EDUCATION/TRAINING PROGRAM

## 2023-10-27 PROCEDURE — 4010F ACE/ARB THERAPY RXD/TAKEN: CPT | Mod: CPTII,95,, | Performed by: STUDENT IN AN ORGANIZED HEALTH CARE EDUCATION/TRAINING PROGRAM

## 2023-10-27 PROCEDURE — 4010F PR ACE/ARB THEARPY RXD/TAKEN: ICD-10-PCS | Mod: CPTII,95,, | Performed by: STUDENT IN AN ORGANIZED HEALTH CARE EDUCATION/TRAINING PROGRAM

## 2023-10-27 PROCEDURE — 3044F HG A1C LEVEL LT 7.0%: CPT | Mod: CPTII,95,, | Performed by: STUDENT IN AN ORGANIZED HEALTH CARE EDUCATION/TRAINING PROGRAM

## 2023-10-27 PROCEDURE — 3044F PR MOST RECENT HEMOGLOBIN A1C LEVEL <7.0%: ICD-10-PCS | Mod: CPTII,95,, | Performed by: STUDENT IN AN ORGANIZED HEALTH CARE EDUCATION/TRAINING PROGRAM

## 2023-10-27 PROCEDURE — 99215 OFFICE O/P EST HI 40 MIN: CPT | Mod: 95,,, | Performed by: STUDENT IN AN ORGANIZED HEALTH CARE EDUCATION/TRAINING PROGRAM

## 2023-10-27 PROCEDURE — 99215 PR OFFICE/OUTPT VISIT, EST, LEVL V, 40-54 MIN: ICD-10-PCS | Mod: 95,,, | Performed by: STUDENT IN AN ORGANIZED HEALTH CARE EDUCATION/TRAINING PROGRAM

## 2023-10-27 RX ORDER — PREGABALIN 50 MG/1
50 CAPSULE ORAL NIGHTLY
Qty: 60 CAPSULE | Refills: 5
Start: 2023-10-27 | End: 2024-03-06 | Stop reason: SDUPTHER

## 2023-10-27 RX ORDER — HYDROCODONE BITARTRATE AND ACETAMINOPHEN 10; 325 MG/1; MG/1
1 TABLET ORAL EVERY 6 HOURS PRN
Qty: 120 TABLET | Refills: 0 | Status: SHIPPED | OUTPATIENT
Start: 2023-10-27 | End: 2023-11-01 | Stop reason: SDUPTHER

## 2023-10-27 NOTE — PROGRESS NOTES
Palliative Medicine Clinic Note      Consult Requested By: No ref. provider found    Primary Care Physician:   Cristobal Ann MD    Reason for Consult: Advance care planning and symptom management in the setting of heart Failure     The patient location is: [facility]  The chief complaint leading to consultation is: pain    Visit type: audio only    Face to Face time with patient: 43min  50 minutes of total time spent on the encounter, which includes face to face time and non-face to face time preparing to see the patient (eg, review of tests), Obtaining and/or reviewing separately obtained history, Documenting clinical information in the electronic or other health record, Independently interpreting results (not separately reported) and communicating results to the patient/family/caregiver, or Care coordination (not separately reported).       Each patient provided with medical services by telemedicine is:  (1) informed of the relationship between the physician and patient and the respective role of any other health care provider with respect to management of the patient; and (2) notified that he or she may decline to receive medical services by telemedicine and may withdraw from such care at any time.          ASSESSMENT/PLAN:     Plan/Recommendations:  Diagnoses and all orders for this visit:      Congestive heart failure, unspecified HF chronicity, unspecified heart failure type /  Acute decompensated heart failure / Pulmonary HTN  -NICM since 2013 HFrEF (EF 10%) s/p AICD placement  -Not a candidate for advance therapies   -recent admission for ADHF,last admission June  -now on palliative dobutamine   -Taking Bumex    - Mostly sitting and sob is manageable       Pain   Bilateral lower extremity, sharp burning and tingling pain  -continues to have significant pain in LE, gout exacerbation  -start Lyrica 50 mg q.h.s.  -Will increase Norco 10-325mg q 6H PRN  -On allopurinol and probenecid managed by  PCP  -Has rheumatology appt  2/2024  -Placed E-consult to rheumatology -pt agreeable to this     Fatigue/dyspnea  -discussed the importance of graded exercise  -continues to walk around the house, however describes more weakness in her legs and stairs or getting difficult      Depression/anxiety  - worsening depression  - discussed PM Behavioral therapy and  support   - avoiding Xanax as much as possible   -she stopped Lexapro    Nausea  -  ondansetron (ZOFRAN-ODT) 8 MG  every 12 (twelve) hours PRN    Constipation  - Miralax  - stopped Linzess  -Having daily bowel movements       Palliative care encounter  Medicolegal: Has decision making capacity. Named her daughter Erika Estrada is HCPOA.     Psychosocial:  support system consists of granddaughter, daughter and extended faily    Understanding of disease and Illness Trajectory: Patient  has  adequate understanding of her illness, they can benefit from continued education on what to expect in the future.      Goals of care:    ACP Date: 07/11/2023  I engaged the patient and   in a voluntary conversation about advance care planning and we specifically addressed what the goals of care would be moving forward, in light of the patient's change in clinical status, specifically worsening heart failure with multiple admissions.  We did specifically address the patient's likely prognosis, which is poor.  I shared that in the best case scenario her prognosis might be month and that given the nature of her heart condition she can die any time. I did explain the role for hospice care at this stage of the patient's illness, including its ability to help the patient live with the best quality of life possible.  We explored the patient's values and preferences for future care.  The patient endorses that what is most important right now is to focus on curative/life-prolongation (regardless of treatment burdens),.  She states that she is doing everything in her  power to be evaluated at [facility] because she is hoping to obtain a heart transplant.  Her  encouraged her to do everything as fast as possible so that she can go to [place] soon. Accordingly, we have decided that the best plan to meet the patient's goals includes continuing with treatment      ACP Date: 05/24/2023  I engaged the patient in a conversation about advance care planning and we specifically addressed what the goals of care would be moving forward, in light of the patient's change in clinical status, specifically now on palliative dobutamine.  We did specifically address the patient's likely prognosis, which is poor.  We discussed that dobutamine is mainly to help her feel better and will not change her heart condition. We explored the patient's values and preferences for future care.  The patient endorses that what is most important right now is to focus on remaining as independent as possible, symptom/pain control, and curative/life-prolongation (regardless of treatment burdens).  She shared that she is hoping for the better that she is hoping to get better.  She hopes that her heart will get better, she shared that she knows that she has been told in the past that it will not improve.  She shared that she is hoping for a miracle to happen so that her heart improves.  She is also hoping to be able to spend more time with family to be part of the activities to go out and enjoy eating with them.  Accordingly, we have decided that the best plan to meet the patient's goals includes continuing with treatment      ACP Date: 02/27/2023  I engaged the patient in a conversation about advance care planning.  We discussed HCPOA, she wanted to change her current HCPOA and name her daughter Erika Estrada as her agent and her sister and son as backups. We specifically addressed what the goals of care would be moving forward, in light of the patient's change in clinical status, specifically  transplant workup.  We did specifically address the patient's likely prognosis, which is poor w/o a trasnplant.  We explored the patient's values and preferences for future care.  The patient endorses that what is most important right now is to focus on curative/life-prolongation (regardless of treatment burdens)  Accordingly, we have decided that the best plan to meet the patient's goals includes continuing with treatment      Code status:  Full code    Advance directives:HCPOA on file       min time was spent on advance care planning, goals of care discussion, emotional support, formulating and communicating prognosis and goals of care, exploring burden/benefit of various approaches of treatment.          Follow up: 1m in person        SUBJECTIVE:     History obtained from: patient     complaint: No chief complaint on file.      History of Present Illness / Interval History:  Hafsa Hawley is 58 y.o. year old female presenting with heart failure .  Referred to Palliative Care for evaluation and management of physical symptoms. female attended the appointment alone     Recent ED visits due to lower extremity pain  Continues to have pain in her left food and right knee.  Describes a sharp pain and she is taking her pain medication which helps with her pain but does not completely resolve it.  She takes Tylenol as well.  Describes her lower extremity pain as burning and tingling and sometimes sharp  Worsening anxiety everyday all day.  Taking Zofran daily  Not constipated she is taking Dulcolax  Not on Ozempic for now because of a high co-pay  She is stopped Lexapro    ---------    [facility] admission for ADHF 6/27  CKD plus [place] from cardiorenal syndrome and ADCHF. She now is close to euvolemic. On  dobutamine 5mcg/kg/min and Bumex   She reports nausea occasionally, having daily bowel movements.  Has cough all day however she does not feel swollen.  He feels her legs are weaker and now stairs her becoming  difficult to navigate.    ----------  She states that she has not been feeling good.  She has abdominal pain significant constipation.  She also describes nausea and bloating.  She shared that her medications are constantly changing so it is difficult for her to keep track of those changes.  She is compliant with medications she is drinking some water because she feels very dry.  She has an appointment with Cardiology on the 17th    -----    She reports that she is feeling okay now that she is on the dobutamine pump.  She feels it has made a difference in her energy level and that she is not eating too much oxygen anymore.  She continues to hope for improvement.  However she is unable to do the things that she enjoys like spending time with her grandkids and going out because sometimes she is not feeling well.  She feels fatigued she has a lot of pain in her knees and her hips and legs.  She is unable to walk at times.  She also endorsed decreased appetite and significant nausea that improves with Zofran however she needs to take 8 mg for it to work.         Disease History:  NICM since 2013 HFrEF (EF 10%) s/p AICD placement  pulmonary hypertension  chronic hypoxic and hypercapnic respiratory failure   Paroxysmal A Fib on Eliquis, Pulmonary Hypertension  Hypertension, Type 2 Diabetes Mellitus     presented to committee on 3/9/22 declined for OHT or LVAD due to renal function, lung function     Previous experience or exposure to a serious illness: no      External  database queried on 10/27/2023  by Sandra Hernandes .   The results reviewed and considered with the clinical data in the decision whether or not to prescribe a controlled substance.    10/17/2023  08/11/2023   3  Promethazine-Codeine Solution 240.00  16  [facility]  [id number]   [facility] (1974)  3  4.50 MME  [place]    10/11/2023  10/05/2023   3  Hydrocodone-Acetamin  Mg 90.00  30  [facility]  [id number]   [place] (1974)  0  30.00 M[place]     10/04/2023  07/07/2023   1  Alprazolam 2 Mg Tablet 60.00  30  [facility]  [id number]   [place] ([id number])  3  8.00 L[place]    09/14/2023 08/11/2023   3  Promethazine-Codeine Solution 240.00  16  [facility]  [id number]   [facility] (1974)  2  4.50 MME  [place]    09/12/2023 09/12/2023   1  Hydrocodone-Acetamin 7.5-325 90.00  30  [facility]  [id number]   [place] ([id number])              Medications:    Current Outpatient Medications:     acetaZOLAMIDE (DIAMOX) 250 MG tablet, TAKE 1 TABLET ORALLY 2 TIMES A DAY. TAKE IN THE MORNING AND AT 2PM, Disp: , Rfl:     albuterol-ipratropium (DUO-NEB) 2.5 mg-0.5 mg/3 mL nebulizer solution, Take 3 mLs by nebulization every 6 (six) hours as needed for Wheezing or Shortness of Breath., Disp: 90 mL, Rfl: 3    allopurinoL (ZYLOPRIM) 300 MG tablet, Take 1 tablet (300 mg total) by mouth once daily., Disp: 90 tablet, Rfl: 3    ALPRAZolam (XANAX) 2 MG Tab, Take 2 mg by mouth 2 (two) times daily., Disp: , Rfl:     amiodarone (PACERONE) 200 MG Tab, Take 200 mg by mouth., Disp: , Rfl:     atorvastatin (LIPITOR) 40 MG tablet, Take 1 tablet (40 mg total) by mouth every evening., Disp: 90 tablet, Rfl: 3    b complex vitamins tablet, Take 1 tablet by mouth once daily., Disp: , Rfl:     blood sugar diagnostic (ACCU-CHEK GUIDE TEST STRIPS) Strp, 1 strip by Other route 3 (three) times daily., Disp: 100 each, Rfl: 11    blood-glucose sensor (DEXCOM G7 SENSOR) Evon, 1 Device by Misc.(Non-Drug; Combo Route) route every 10 days., Disp: 3 each, Rfl: 11    bumetanide (BUMEX) 2 MG tablet, Take 2 tablets (4mg total) by mouth in morning and take 1 tablet (2mg total) by mouth in the evening (no later then 5pm)., Disp: 90 tablet, Rfl: 5    cetirizine (ZYRTEC) 5 MG tablet, Take 1 tablet (5 mg total) by mouth daily as needed for Allergies., Disp: , Rfl:     diclofenac sodium (VOLTAREN) 1 % Gel, APPLY 2 GRAMS TOPICALLY 3 (THREE) TIMES DAILY AS NEEDED (PAIN)., Disp: 300 g, Rfl: 1    DOBUTamine (DOBUTREX) 1,000 mg/250 mL (4,000 mcg/mL)  infusion, Inject 409 mcg/min into the vein continuous., Disp: , Rfl:     ELIQUIS 5 mg Tab, TAKE 1 TABLET (5 MG TOTAL) BY MOUTH 2 (TWO) TIMES DAILY., Disp: 60 tablet, Rfl: 2    ergocalciferol (ERGOCALCIFEROL) 50,000 unit Cap, Take 1 capsule (50,000 Units total) by mouth every Saturday., Disp: 12 capsule, Rfl: 1    ferrous sulfate (FEOSOL) 325 mg (65 mg iron) Tab tablet, Take 1 tablet (325 mg total) by mouth once daily., Disp: , Rfl: 0    fluticasone propionate (FLONASE) 50 mcg/actuation nasal spray, 2 sprays by Each Nostril route daily as needed for Rhinitis. , Disp: , Rfl:     JARDIANCE 25 mg tablet, Take 25 mg by mouth., Disp: , Rfl:     lancets (ACCU-CHEK SOFTCLIX LANCETS) Misc, 1 Device by Misc.(Non-Drug; Combo Route) route 3 (three) times daily., Disp: 100 each, Rfl: 11    LIDOcaine (LIDODERM) 5 %, Place 1 patch onto the skin once daily. Remove & Discard patch within 12 hours or as directed by MD, Disp: 30 patch, Rfl: 2    metOLazone (ZAROXOLYN) 5 MG tablet, Take by mouth., Disp: , Rfl:     metoprolol succinate (TOPROL-XL) 50 MG 24 hr tablet, 2 tabs by mouth in am and 1 tab by mouth in pm., Disp: , Rfl:     mometasone-formoterol (DULERA) 200-5 mcg/actuation inhaler, Inhale 2 puffs into the lungs 2 (two) times daily. Controller, Disp: 13 g, Rfl: 11    MOUNJARO 2.5 mg/0.5 mL PnIj, INJECT 2.5MG (SUBCUTANEOUS) ONCE A WEEK, Disp: , Rfl:     ondansetron (ZOFRAN-ODT) 4 MG TbDL, Take 4 mg by mouth every 8 (eight) hours as needed., Disp: , Rfl:     pantoprazole (PROTONIX) 40 MG tablet, TAKE 1 TABLET BY MOUTH DAILY IN THE MORNING, Disp: 30 tablet, Rfl: 11    pregabalin (LYRICA) 50 MG capsule, Take 1 capsule (50 mg total) by mouth every evening., Disp: 60 capsule, Rfl: 5    probenecid (BENEMID) 500 mg Tab, Take 1/2 tab twice daily, Disp: 60 tablet, Rfl: 0    promethazine (PHENERGAN) 25 MG suppository, Place 1 suppository (25 mg total) rectally every 6 (six) hours as needed for Nausea., Disp: 10 suppository, Rfl: 5     "promethazine-codeine 6.25-10 mg/5 ml (PHENERGAN WITH CODEINE) 6.25-10 mg/5 mL syrup, Take 5 mLs by mouth every 8 (eight) hours as needed for Cough., Disp: 240 mL, Rfl: 5    rOPINIRole (REQUIP) 4 MG tablet, TAKE 1 TABLET (4 MG TOTAL) BY MOUTH ONCE DAILY. PT TAKING 4MG DAILY, Disp: 90 tablet, Rfl: 1    sacubitriL-valsartan (ENTRESTO) 49-51 mg per tablet, Entresto 49 mg-51 mg tablet, [RxNorm: 6587710], Disp: , Rfl:     scopolamine (TRANSDERM-SCOP) 1.3-1.5 mg (1 mg over 3 days), SMARTSI Patch(s) Topical Every 3 Days PRN, Disp: , Rfl:     SPIRIVA WITH HANDIHALER 18 mcg inhalation capsule, Inhale 1 capsule (18 mcg total) into the lungs once daily., Disp: 30 capsule, Rfl: 5    VENTOLIN HFA 90 mcg/actuation inhaler, Inhale 2 puffs into the lungs every 6 (six) hours as needed for Shortness of Breath or Wheezing., Disp: 18 g, Rfl: 11  No current facility-administered medications for this visit.    Facility-Administered Medications Ordered in Other Visits:     0.9%  NaCl infusion, , Intravenous, Continuous, Corinna Hayes NP, New Bag at 19 0745    vancomycin in dextrose 5 % 1 gram/250 mL IVPB 1,000 mg, 1,000 mg, Intravenous, On Call Procedure, Corinna Hayes NP, 1,000 mg at 19 0736      Review of patient's allergies indicates:   Allergen Reactions    Penicillins Hives and Other (See Comments)    Iodinated contrast media Nausea And Vomiting    Oxycodone-acetaminophen Other (See Comments)     Nausea, Dizziness, Anxiety.  "I don't like how it makes me feel."   Given Hydromorphone 0.5mg IVP  Without problems.  Other reaction(s): Other (See Comments)    Clonazepam Other (See Comments)    Diovan hct [valsartan-hydrochlorothiazide] Other (See Comments)     Causes coughing    Iodine Other (See Comments)    Irinotecan      Pt has homozygosity for the TA7 promoter variant that places this individual at significantly increased risk for   severe neutropenia (grade 4) when treated with the standard dose of " "irinotecan (risk approximately 50%).   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 Metabolism of other drugs not listed here may also be impacted by UGT1A1 enzyme activity.       Tramadol Nausea And Vomiting and Other (See Comments)     Other reaction(s): Other (See Comments)    Valsartan Other (See Comments)       OBJECTIVE:       Review of Symptoms      Symptom Assessment (ESAS 0-10 Scale)  Pain:  8  Dyspnea:  0  Anxiety:  8  Nausea:  5  Depression:  0  Anorexia:  0  Fatigue:  0  Insomnia:  8  Restlessness:  0  Agitation:  0     CAM / Delirium:  Negative  Constipation:  Negative  Diarrhea:  Negative    Anxiety:  Is not nervous/anxious  Constipation:  Constipation    Comments:  Miralax    Pain Assessment:    Location(s): leg    Leg       Location: bilateral        Quality: Aching, shooting and tingling        Quantity: 8/10 in intensity        Chronicity: Onset 2 month(s) ago, gradually worsening        Aggravating Factors: Walking        Alleviating Factors: Opiates        Associated Symptoms: None    Performance Status:  60    Living Arrangements:  Lives with family    Psychosocial/Cultural:   See Palliative Psychosocial Note: Yes  Lives with  and granddaughter. Pt lives in a mobile home with 5 NIRAJ. Pt also has support from her two sisters Benoit Baum [phone], Jeanie Jaimes [phone]. Pt is mostly independent with ADL's, but has a RW, SC and home oxygen, which she states she uses "as needed".   dgtr Erika Estrada, 33, [place], son Miguel Baum, 35  **Primary  to Follow**  Palliative Care  Consult: Yes    Spiritual:  F - Damaris and Belief:  Jew       Advance Care Planning   Advance Directives:   Living Will: No    LaPOST: No    Do Not Resuscitate Status: No    Medical Power of : Yes    Agent's Name:  Erika Estrada    Decision Making:  Patient " answered questions  Goals of Care: What is most important right now is to focus on curative/life-prolongation (regardless of treatment burdens), remaining as independent as possible, symptom/pain control. Accordingly, we have decided that the best plan to meet the patient's goals includes continuing with treatment.              Physical Exam:  Vitals:    Physical Exam  Constitutional:       General: She is not in acute distress.  Pulmonary:      Effort: Pulmonary effort is normal. No respiratory distress.   Musculoskeletal:      Cervical back: Neck supple.   Neurological:      Mental Status: She is alert and oriented to person, place, and time.   Psychiatric:         Mood and Affect: Mood and affect normal.           Labs:  CBC:   WBC   Date Value Ref Range Status   10/23/2023 5.06 3.90 - 12.70 K/uL Final     Hemoglobin   Date Value Ref Range Status   10/23/2023 7.8 (L) 12.0 - 16.0 g/dL Final     POC Hematocrit   Date Value Ref Range Status   12/07/2021 45 36 - 54 %PCV Final     Hematocrit   Date Value Ref Range Status   10/23/2023 25.3 (L) 37.0 - 48.5 % Final     MCV   Date Value Ref Range Status   10/23/2023 56 (L) 82 - 98 fL Final     Platelets   Date Value Ref Range Status   10/23/2023 314 150 - 450 K/uL Final       LFT:   Lab Results   Component Value Date    AST 16 10/23/2023    ALKPHOS 74 10/23/2023    BILITOT 0.9 10/23/2023       Albumin:   Albumin   Date Value Ref Range Status   10/23/2023 3.4 (L) 3.5 - 5.2 g/dL Final     Protein:   Total Protein   Date Value Ref Range Status   10/23/2023 7.0 6.0 - 8.4 g/dL Final         Radiology:I have reviewed all pertinent imaging results/findings within the past 24 hours.  Results for orders placed during the hospital encounter of 03/26/23    Echo    Interpretation Summary  · The left ventricle is severely enlarged with eccentric hypertrophy and severely decreased systolic function. The estimated ejection fraction is 15%.  · Normal right ventricular size with  moderately reduced right ventricular systolic function.  · Grade II left ventricular diastolic dysfunction.  · Biatrial enlargement.  · Mild mitral regurgitation.  · Small pericardial effusion.  · The estimated PA systolic pressure is 51 mmHg (by tricuspid regurgitation velocity). The estimated mean PA pressure is 39mm Hg.  · Intermediate central venous pressure (8 mmHg).        I spent a total of 50 minutes on the day of the visit. This includes face to face time in discussion of goals of care, symptom assessment, coordination of care and emotional support.  This also includes non-face to face time preparing to see the patient (eg, review of tests/imaging), obtaining and/or reviewing separately obtained history, documenting clinical information in the electronic or other health record, independently interpreting results and communicating results to the patient/family/caregiver, or care coordinator.       This note was partially created using [employer] Voice Recognition software. Typographical and content errors may occur with this process. While efforts are made to detect and correct such errors, in some cases errors will persist. For this reason, wording in this document should be considered in the proper context and not strictly verbatim.      Signature: Sandra Hernandes MD

## 2023-10-27 NOTE — PROGRESS NOTES
Transitional Care Note  Subjective:       Patient ID: Hafsa Hawley is a 58 y.o. female.  Chief Complaint: Follow-up (ER/Hospital follow up for possible gout)    Family and/or Caretaker present at visit?  No.  Diagnostic tests reviewed/disposition: No diagnosic tests pending after this hospitalization.  Disease/illness education: gout  Home health/community services discussion/referrals: Patient has home health established at [facility] .   Establishment or re-establishment of referral orders for community resources: No other necessary community resources.   Discussion with other health care providers:  need to discuss with dr. augustin .   58 year old female with end stage heart disease, chf, on home dobutamine, significant diuretics and pain secondary to gout presents with c/o terrible pain in her right knee and foot. She had a visit with ortho who had an orthocentesis completed. Results pending. She has been taking norco for her pain and says that after her last visit she has only 16 pills left.    Follow-up  Associated symptoms include arthralgias. Pertinent negatives include no abdominal pain, chest pain, chills, congestion, coughing, fever, headaches, nausea, rash, sore throat, vomiting or weakness.     Review of Systems   Constitutional:  Negative for chills and fever.   HENT:  Negative for congestion, ear pain, postnasal drip, rhinorrhea, sore throat and trouble swallowing.    Eyes:  Negative for redness and itching.   Respiratory:  Positive for shortness of breath. Negative for cough and wheezing.    Cardiovascular:  Negative for chest pain and palpitations.   Gastrointestinal:  Negative for abdominal pain, diarrhea, nausea and vomiting.   Genitourinary:  Negative for dysuria and frequency.   Musculoskeletal:  Positive for arthralgias.   Skin:  Negative for rash.   Neurological:  Negative for weakness and headaches.       Objective:      Physical Exam  Vitals and nursing note reviewed.   Constitutional:        General: She is not in acute distress.     Appearance: She is well-developed.   HENT:      Head: Normocephalic and atraumatic.   Eyes:      Conjunctiva/sclera: Conjunctivae normal.      Pupils: Pupils are equal, round, and reactive to light.   Neck:      Thyroid: No thyromegaly.   Cardiovascular:      Rate and Rhythm: Normal rate and regular rhythm.      Heart sounds: Murmur heard.   Pulmonary:      Effort: Pulmonary effort is normal. No respiratory distress.      Breath sounds: Normal breath sounds. No wheezing.   Abdominal:      General: Bowel sounds are normal.      Palpations: Abdomen is soft.      Tenderness: There is no abdominal tenderness.   Musculoskeletal:         General: Swelling (right knee) present. Normal range of motion.      Cervical back: Normal range of motion and neck supple.      Comments: Left ankle in brace   Lymphadenopathy:      Cervical: No cervical adenopathy.   Skin:     General: Skin is warm and dry.      Findings: No rash.      Comments: Right upper extrem with picc in place   Neurological:      Mental Status: She is alert and oriented to person, place, and time.   Psychiatric:         Behavior: Behavior normal.         Assessment:       1. Gout, unspecified cause, unspecified chronicity, unspecified site        Plan:       Hafsa was seen today for follow-up.    Diagnoses and all orders for this visit:    Gout, unspecified cause, unspecified chronicity, unspecified site  -     probenecid (BENEMID) 500 mg Tab; Take 1/2 tab twice daily  -     Basic Metabolic Panel; Future  -     Uric Acid; Future    Other orders  -     Discontinue: febuxostat (ULORIC) 40 mg Tab; Take 1 tablet (40 mg total) by mouth once daily.    Her gout is significantly impairing her daily life.  Will discuss with cards loweirng/adjusting diuretics.  Can't be on losartan - on entresto.  May be able to tolerate probenecid, but may not help because of her level of renal impairment. Will check bmp and uric acid in 1  week.  Stay on allopurinol.    Uloric may need to be considered but with her heart/cv issues this is not ideal.    RTC if condition acutely worsens or any other concerns, otherwise RTC as scheduled    \

## 2023-10-28 LAB
BACTERIA FLD AEROBE CULT: NO GROWTH
GRAM STN SPEC: NORMAL
GRAM STN SPEC: NORMAL

## 2023-10-29 LAB — BACTERIA FLD CULT: NORMAL

## 2023-10-31 ENCOUNTER — CARE AT HOME (OUTPATIENT)
Dept: HOME HEALTH SERVICES | Facility: CLINIC | Age: 58
End: 2023-10-31
Payer: MEDICAID

## 2023-10-31 DIAGNOSIS — F06.31 DEPRESSION DUE TO PHYSICAL ILLNESS: Chronic | ICD-10-CM

## 2023-10-31 DIAGNOSIS — Z71.89 ADVANCE CARE PLANNING: ICD-10-CM

## 2023-10-31 DIAGNOSIS — M25.561 CHRONIC PAIN OF RIGHT KNEE: ICD-10-CM

## 2023-10-31 DIAGNOSIS — J98.4 RESTRICTIVE LUNG DISEASE: ICD-10-CM

## 2023-10-31 DIAGNOSIS — R53.81 DEBILITY: ICD-10-CM

## 2023-10-31 DIAGNOSIS — I42.8 NICM (NONISCHEMIC CARDIOMYOPATHY): Primary | Chronic | ICD-10-CM

## 2023-10-31 DIAGNOSIS — N18.4 TYPE 2 DIABETES MELLITUS WITH STAGE 4 CHRONIC KIDNEY DISEASE, WITHOUT LONG-TERM CURRENT USE OF INSULIN: ICD-10-CM

## 2023-10-31 DIAGNOSIS — Z74.09 OTHER REDUCED MOBILITY: ICD-10-CM

## 2023-10-31 DIAGNOSIS — G89.29 CHRONIC PAIN OF RIGHT KNEE: ICD-10-CM

## 2023-10-31 DIAGNOSIS — E11.22 TYPE 2 DIABETES MELLITUS WITH STAGE 4 CHRONIC KIDNEY DISEASE, WITHOUT LONG-TERM CURRENT USE OF INSULIN: ICD-10-CM

## 2023-10-31 DIAGNOSIS — N18.4 CKD (CHRONIC KIDNEY DISEASE), STAGE IV: Chronic | ICD-10-CM

## 2023-10-31 PROCEDURE — 3044F HG A1C LEVEL LT 7.0%: CPT | Mod: CPTII,S$GLB,, | Performed by: NURSE PRACTITIONER

## 2023-10-31 PROCEDURE — 4010F ACE/ARB THERAPY RXD/TAKEN: CPT | Mod: CPTII,S$GLB,, | Performed by: NURSE PRACTITIONER

## 2023-10-31 PROCEDURE — 1159F PR MEDICATION LIST DOCUMENTED IN MEDICAL RECORD: ICD-10-PCS | Mod: CPTII,S$GLB,, | Performed by: NURSE PRACTITIONER

## 2023-10-31 PROCEDURE — 4010F PR ACE/ARB THEARPY RXD/TAKEN: ICD-10-PCS | Mod: CPTII,S$GLB,, | Performed by: NURSE PRACTITIONER

## 2023-10-31 PROCEDURE — 1159F MED LIST DOCD IN RCRD: CPT | Mod: CPTII,S$GLB,, | Performed by: NURSE PRACTITIONER

## 2023-10-31 PROCEDURE — 99350 PR HOME VISIT,ESTAB PATIENT,LEVEL IV: ICD-10-PCS | Mod: S$GLB,,, | Performed by: NURSE PRACTITIONER

## 2023-10-31 PROCEDURE — 3077F PR MOST RECENT SYSTOLIC BLOOD PRESSURE >= 140 MM HG: ICD-10-PCS | Mod: CPTII,S$GLB,, | Performed by: NURSE PRACTITIONER

## 2023-10-31 PROCEDURE — 3080F PR MOST RECENT DIASTOLIC BLOOD PRESSURE >= 90 MM HG: ICD-10-PCS | Mod: CPTII,S$GLB,, | Performed by: NURSE PRACTITIONER

## 2023-10-31 PROCEDURE — 1160F PR REVIEW ALL MEDS BY PRESCRIBER/CLIN PHARMACIST DOCUMENTED: ICD-10-PCS | Mod: CPTII,S$GLB,, | Performed by: NURSE PRACTITIONER

## 2023-10-31 PROCEDURE — 1160F RVW MEDS BY RX/DR IN RCRD: CPT | Mod: CPTII,S$GLB,, | Performed by: NURSE PRACTITIONER

## 2023-10-31 PROCEDURE — 99497 ADVNCD CARE PLAN 30 MIN: CPT | Mod: S$GLB,,, | Performed by: NURSE PRACTITIONER

## 2023-10-31 PROCEDURE — 3066F NEPHROPATHY DOC TX: CPT | Mod: CPTII,S$GLB,, | Performed by: NURSE PRACTITIONER

## 2023-10-31 PROCEDURE — 3044F PR MOST RECENT HEMOGLOBIN A1C LEVEL <7.0%: ICD-10-PCS | Mod: CPTII,S$GLB,, | Performed by: NURSE PRACTITIONER

## 2023-10-31 PROCEDURE — 3066F PR DOCUMENTATION OF TREATMENT FOR NEPHROPATHY: ICD-10-PCS | Mod: CPTII,S$GLB,, | Performed by: NURSE PRACTITIONER

## 2023-10-31 PROCEDURE — 3077F SYST BP >= 140 MM HG: CPT | Mod: CPTII,S$GLB,, | Performed by: NURSE PRACTITIONER

## 2023-10-31 PROCEDURE — 99350 HOME/RES VST EST HIGH MDM 60: CPT | Mod: S$GLB,,, | Performed by: NURSE PRACTITIONER

## 2023-10-31 PROCEDURE — 99497 PR ADVNCD CARE PLAN 30 MIN: ICD-10-PCS | Mod: S$GLB,,, | Performed by: NURSE PRACTITIONER

## 2023-10-31 PROCEDURE — 3080F DIAST BP >= 90 MM HG: CPT | Mod: CPTII,S$GLB,, | Performed by: NURSE PRACTITIONER

## 2023-11-01 ENCOUNTER — E-CONSULT (OUTPATIENT)
Dept: RHEUMATOLOGY | Facility: CLINIC | Age: 58
End: 2023-11-01
Payer: MEDICAID

## 2023-11-01 ENCOUNTER — INFUSION (OUTPATIENT)
Dept: INFUSION THERAPY | Facility: HOSPITAL | Age: 58
End: 2023-11-01
Attending: INTERNAL MEDICINE
Payer: MEDICAID

## 2023-11-01 VITALS — SYSTOLIC BLOOD PRESSURE: 142 MMHG | HEART RATE: 96 BPM | RESPIRATION RATE: 16 BRPM | DIASTOLIC BLOOD PRESSURE: 74 MMHG

## 2023-11-01 DIAGNOSIS — M10.9 GOUT, ARTHRITIS: Primary | ICD-10-CM

## 2023-11-01 DIAGNOSIS — I42.9 CARDIOMYOPATHY, UNSPECIFIED TYPE: Primary | ICD-10-CM

## 2023-11-01 DIAGNOSIS — R52 PAIN: ICD-10-CM

## 2023-11-01 LAB
ALBUMIN SERPL BCP-MCNC: 3.7 G/DL (ref 3.5–5.2)
ALP SERPL-CCNC: 65 U/L (ref 55–135)
ALT SERPL W/O P-5'-P-CCNC: 13 U/L (ref 10–44)
ANION GAP SERPL CALC-SCNC: 12 MMOL/L (ref 8–16)
AST SERPL-CCNC: 21 U/L (ref 10–40)
BASOPHILS # BLD AUTO: 0.02 K/UL (ref 0–0.2)
BASOPHILS NFR BLD: 0.4 % (ref 0–1.9)
BILIRUB SERPL-MCNC: 1.4 MG/DL (ref 0.1–1)
BNP SERPL-MCNC: 637 PG/ML (ref 0–99)
BUN SERPL-MCNC: 31 MG/DL (ref 6–20)
CALCIUM SERPL-MCNC: 9.4 MG/DL (ref 8.7–10.5)
CHLORIDE SERPL-SCNC: 105 MMOL/L (ref 95–110)
CO2 SERPL-SCNC: 26 MMOL/L (ref 23–29)
CREAT SERPL-MCNC: 2.1 MG/DL (ref 0.5–1.4)
DIFFERENTIAL METHOD: ABNORMAL
EOSINOPHIL # BLD AUTO: 0.1 K/UL (ref 0–0.5)
EOSINOPHIL NFR BLD: 2.1 % (ref 0–8)
ERYTHROCYTE [DISTWIDTH] IN BLOOD BY AUTOMATED COUNT: 29.8 % (ref 11.5–14.5)
EST. GFR  (NO RACE VARIABLE): 27 ML/MIN/1.73 M^2
GLUCOSE SERPL-MCNC: 78 MG/DL (ref 70–110)
HCT VFR BLD AUTO: 28.2 % (ref 37–48.5)
HGB BLD-MCNC: 8.5 G/DL (ref 12–16)
IMM GRANULOCYTES # BLD AUTO: 0.03 K/UL (ref 0–0.04)
IMM GRANULOCYTES NFR BLD AUTO: 0.6 % (ref 0–0.5)
LYMPHOCYTES # BLD AUTO: 0.8 K/UL (ref 1–4.8)
LYMPHOCYTES NFR BLD: 15.4 % (ref 18–48)
MAGNESIUM SERPL-MCNC: 1.8 MG/DL (ref 1.6–2.6)
MCH RBC QN AUTO: 17.4 PG (ref 27–31)
MCHC RBC AUTO-ENTMCNC: 30.1 G/DL (ref 32–36)
MCV RBC AUTO: 58 FL (ref 82–98)
MONOCYTES # BLD AUTO: 0.4 K/UL (ref 0.3–1)
MONOCYTES NFR BLD: 8.5 % (ref 4–15)
NEUTROPHILS # BLD AUTO: 3.8 K/UL (ref 1.8–7.7)
NEUTROPHILS NFR BLD: 73 % (ref 38–73)
NRBC BLD-RTO: 0 /100 WBC
PHOSPHATE SERPL-MCNC: 3.1 MG/DL (ref 2.7–4.5)
PLATELET # BLD AUTO: 214 K/UL (ref 150–450)
PMV BLD AUTO: ABNORMAL FL (ref 9.2–12.9)
POTASSIUM SERPL-SCNC: 4 MMOL/L (ref 3.5–5.1)
PROT SERPL-MCNC: 7.1 G/DL (ref 6–8.4)
RBC # BLD AUTO: 4.88 M/UL (ref 4–5.4)
SODIUM SERPL-SCNC: 143 MMOL/L (ref 136–145)
WBC # BLD AUTO: 5.18 K/UL (ref 3.9–12.7)

## 2023-11-01 PROCEDURE — 83735 ASSAY OF MAGNESIUM: CPT | Performed by: INTERNAL MEDICINE

## 2023-11-01 PROCEDURE — 85025 COMPLETE CBC W/AUTO DIFF WBC: CPT | Performed by: INTERNAL MEDICINE

## 2023-11-01 PROCEDURE — 99451 NTRPROF PH1/NTRNET/EHR 5/>: CPT | Mod: S$PBB,,, | Performed by: INTERNAL MEDICINE

## 2023-11-01 PROCEDURE — 36591 DRAW BLOOD OFF VENOUS DEVICE: CPT

## 2023-11-01 PROCEDURE — 36415 COLL VENOUS BLD VENIPUNCTURE: CPT | Performed by: INTERNAL MEDICINE

## 2023-11-01 PROCEDURE — 84100 ASSAY OF PHOSPHORUS: CPT | Performed by: INTERNAL MEDICINE

## 2023-11-01 PROCEDURE — 83880 ASSAY OF NATRIURETIC PEPTIDE: CPT | Performed by: INTERNAL MEDICINE

## 2023-11-01 PROCEDURE — 99451 PR INTERPROF, PHONE/INTERNET/EHR, CONSULT, >= 5MINS: ICD-10-PCS | Mod: S$PBB,,, | Performed by: INTERNAL MEDICINE

## 2023-11-01 PROCEDURE — 80053 COMPREHEN METABOLIC PANEL: CPT | Performed by: INTERNAL MEDICINE

## 2023-11-02 RX ORDER — PREDNISONE 5 MG/1
5 TABLET ORAL DAILY
Qty: 30 TABLET | Refills: 3 | Status: SHIPPED | OUTPATIENT
Start: 2023-11-02

## 2023-11-02 RX ORDER — HYDROCODONE BITARTRATE AND ACETAMINOPHEN 10; 325 MG/1; MG/1
1 TABLET ORAL EVERY 6 HOURS PRN
Qty: 120 TABLET | Refills: 0 | Status: SHIPPED | OUTPATIENT
Start: 2023-11-02 | End: 2023-12-07 | Stop reason: SDUPTHER

## 2023-11-02 NOTE — CONSULTS
[facility]  Response for E-Consult     Patient Name: Hafsa Hawley  MRN: [id number]  Primary Care Provider: Cristobal Ann MD   Requesting Provider: Sandra Hernandes, *  Consults    Recommendation:  Severe gout.  Higher dose of allopurinol contraindicated due to chronic kidney disease.  Please discontinue probenecid since it is contraindicated in patients with GFR less than 30.  Uloric is contraindicated due to cardiovascular disease.  She would be a candidate for Krystexxa infusion therapy.  Please refer her to see a rheumatologist.  Use prednisone 10-20 mg daily in the interim to control flares.    Total time of Consultation: 5 minute    I did not speak to the requesting provider verbally about this.     *This eConsult is based on the clinical data available to me and is furnished without benefit of a physical examination. The eConsult will need to be interpreted in light of any clinical issues or changes in patient status not available to me at the time of filing this eConsults. Significant changes in patient condition or level of acuity should result in immediate formal consultation and reevaluation. Please alert me if you have further questions.    Thank you for this eConsult referral.     Sabino Landry MD  [facility]

## 2023-11-02 NOTE — TELEPHONE ENCOUNTER
Left VM instructing to stop probenicid and start prednisone per Dr. Michel.. Asked for return call.

## 2023-11-03 RX ORDER — ERGOCALCIFEROL 1.25 MG/1
50000 CAPSULE ORAL
Qty: 12 CAPSULE | Refills: 1 | Status: SHIPPED | OUTPATIENT
Start: 2023-11-04 | End: 2023-11-06 | Stop reason: SDUPTHER

## 2023-11-06 ENCOUNTER — TELEPHONE (OUTPATIENT)
Dept: HOME HEALTH SERVICES | Facility: CLINIC | Age: 58
End: 2023-11-06
Payer: MEDICAID

## 2023-11-06 ENCOUNTER — INFUSION (OUTPATIENT)
Dept: INFUSION THERAPY | Facility: HOSPITAL | Age: 58
End: 2023-11-06
Attending: INTERNAL MEDICINE
Payer: MEDICAID

## 2023-11-06 VITALS
RESPIRATION RATE: 16 BRPM | DIASTOLIC BLOOD PRESSURE: 90 MMHG | HEART RATE: 83 BPM | SYSTOLIC BLOOD PRESSURE: 150 MMHG | OXYGEN SATURATION: 97 %

## 2023-11-06 VITALS
DIASTOLIC BLOOD PRESSURE: 70 MMHG | RESPIRATION RATE: 20 BRPM | TEMPERATURE: 98 F | SYSTOLIC BLOOD PRESSURE: 115 MMHG | HEART RATE: 91 BPM

## 2023-11-06 DIAGNOSIS — M25.561 CHRONIC PAIN OF RIGHT KNEE: ICD-10-CM

## 2023-11-06 DIAGNOSIS — M77.12 LEFT LATERAL EPICONDYLITIS: ICD-10-CM

## 2023-11-06 DIAGNOSIS — I42.9 CARDIOMYOPATHY, UNSPECIFIED TYPE: Primary | ICD-10-CM

## 2023-11-06 DIAGNOSIS — E11.65 TYPE 2 DIABETES MELLITUS WITH HYPERGLYCEMIA, WITHOUT LONG-TERM CURRENT USE OF INSULIN: ICD-10-CM

## 2023-11-06 DIAGNOSIS — E11.69 HYPERLIPIDEMIA ASSOCIATED WITH TYPE 2 DIABETES MELLITUS: ICD-10-CM

## 2023-11-06 DIAGNOSIS — G89.29 CHRONIC PAIN OF RIGHT KNEE: ICD-10-CM

## 2023-11-06 DIAGNOSIS — E78.5 HYPERLIPIDEMIA ASSOCIATED WITH TYPE 2 DIABETES MELLITUS: ICD-10-CM

## 2023-11-06 DIAGNOSIS — M10.9 GOUT, ARTHRITIS: ICD-10-CM

## 2023-11-06 DIAGNOSIS — M25.532 LEFT WRIST PAIN: ICD-10-CM

## 2023-11-06 DIAGNOSIS — M51.36 LUMBAR DEGENERATIVE DISC DISEASE: ICD-10-CM

## 2023-11-06 DIAGNOSIS — M67.40 GANGLION CYST: ICD-10-CM

## 2023-11-06 PROBLEM — Z74.09 OTHER REDUCED MOBILITY: Status: ACTIVE | Noted: 2023-11-06

## 2023-11-06 PROCEDURE — 36591 DRAW BLOOD OFF VENOUS DEVICE: CPT

## 2023-11-06 RX ORDER — ISOSORBIDE DINITRATE AND HYDRALAZINE HYDROCHLORIDE 37.5; 2 MG/1; MG/1
TABLET ORAL
COMMUNITY
Start: 2023-10-30

## 2023-11-06 RX ORDER — HYDROCODONE BITARTRATE AND ACETAMINOPHEN 7.5; 325 MG/1; MG/1
1 TABLET ORAL EVERY 8 HOURS PRN
COMMUNITY
Start: 2023-10-26 | End: 2023-12-07

## 2023-11-06 RX ORDER — FEBUXOSTAT 40 MG/1
40 TABLET, FILM COATED ORAL DAILY
COMMUNITY
Start: 2023-10-26

## 2023-11-06 RX ORDER — FUROSEMIDE 40 MG/1
40 TABLET ORAL 2 TIMES DAILY
Status: ON HOLD | COMMUNITY
Start: 2023-11-01 | End: 2023-12-15 | Stop reason: HOSPADM

## 2023-11-06 RX ORDER — TIRZEPATIDE 2.5 MG/.5ML
INJECTION, SOLUTION SUBCUTANEOUS
COMMUNITY
Start: 2023-10-29 | End: 2024-01-09

## 2023-11-06 NOTE — TELEPHONE ENCOUNTER
----- Message from Tony Bello sent at 2023 10:38 AM CST -----  Contact: pt  Hafsa Hawley  MRN: [id number]  : 1965  PCP: Cristobal Ann  Home Phone      [phone]  Work Phone      Not on file.  Mobile          [phone]      MESSAGE:     Pt called requesting refill of medication promethazine-codeine 6.25-10 mg/5 ml (PHENERGAN WITH CODEINE) 6.25-10 mg/5 mL syrup. Pt stated Dr. Reza prescribed medication to her. Pt would like medication to be sent to [facility].         Please advise  [phone]

## 2023-11-06 NOTE — TELEPHONE ENCOUNTER
No care due was identified.  [facility] Embedded Care Due Messages. Reference number: [id number].   11/06/2023 2:58:58 PM CST

## 2023-11-06 NOTE — TELEPHONE ENCOUNTER
Pt also requesting refill on promethazine-codeine 6.25-10 mg/5 ml (PHENERGAN WITH CODEINE) 6.25-10 mg/5 mL syrup     States switched pharmacy so requesting all meds sent to new pharmacy.       LOV: 10/26/23

## 2023-11-06 NOTE — TELEPHONE ENCOUNTER
Faxed orders and demographics to Paola Morin FNP  for Psych visit per NP request.  Fax confirmation received.

## 2023-11-06 NOTE — ASSESSMENT & PLAN NOTE
-Remains full code    Advance Care Planning     Date: 10/31/2023    [facility]  I engaged the patient in a voluntary conversation about advance care planning and we specifically addressed what the goals of care would be moving forward, in light of the patient's change in clinical status, specifically end stage heart failure.  We did specifically address the patient's likely prognosis, which is poor.  We explored the patient's values and preferences for future care.  The patient endorses that what is most important right now is to focus on remaining as independent as possible, symptom/pain control and curative/life-prolongation (regardless of treatment burdens)    Accordingly, we have decided that the best plan to meet the patient's goals includes continuing with treatment    I did explain the role for hospice care at this stage of the patient's illness, including its ability to help the patient live with the best quality of life possible.  We will not be making a hospice referral.    I spent a total of 30 minutes engaging the patient in this advance care planning discussion.

## 2023-11-06 NOTE — PROGRESS NOTES
[facility]  Medical Home Visit    Visit Date: 10/31/2023  Encounter Provider: Yaquelin Osorio  PCP:  Cristobal Ann MD    Subjective:      Patient ID: Hafsa Hawley is a 58 y.o. female.    Consult Requested By:  Dr. Cristobal Ann  Reason for Consult:  referral for home care due to high no show rate    Hafsa is being seen at home due to being seen at home due to physical debility that presents a taxing effort to leave the home, to mitigate high risk of hospital readmission and/or due to the limited availability of reliable or safe options for transportation to the point of access to the provider. Prior to treatment on this visit the chart was reviewed and patient verbal consent was obtained.    Chief Complaint: Referral, Establish Care, Knee Pain, and Depression      Hafsa Hawley was seen at home per referral from PCP. She has end stage heart failure and is on home dobutamine. She sees palliative care virtually. She is followed by cardiology. She has renal failure and is followed by nephrology as well. She has chronic pain and takes narcotics. She is depressed and cries through the appt today. She had stopped her lexapro in the past. She is upset because she has been taken off the transplant list. She denies missing appts with transplant team. She was told by cardiology at [facility] to obtain a second opinion elsewhere and given a list of facilities. She has been trying to get an appt out of state but has been unsuccessful and feels that her doctors should be assisting her but she is getting no help. She lost her insurance temporarily due to applying out of state for a second opinion. She is concerned because her dobutamine is about to run out and she may not be able to get any more. She was told by her local cardiologist that she doesn't need the dobutamine, but she feels that she does need it otherwise she wouldn't be on it. She has polypharmacy and is upset because every doctor  prescribes new meds and changes meds and they don't communicate with each other. She is having trouble keeping up with it all. She has right knee swelling and is being treated for gout, reports the swelling has improved. She ambulates with a cane and reports activity has improved since starting dobutamine. She would like to remain a full code at this time.            Review of Systems   Constitutional:  Positive for fatigue. Negative for chills and fever.   HENT:  Negative for congestion.    Eyes:  Negative for visual disturbance.   Respiratory:  Negative for shortness of breath.    Cardiovascular:  Negative for chest pain.   Gastrointestinal:  Negative for abdominal pain and nausea.   Genitourinary:  Negative for dysuria.   Musculoskeletal:  Positive for arthralgias and joint swelling.   Skin:  Negative for wound.   Neurological:  Positive for weakness. Negative for headaches.   Psychiatric/Behavioral:  Positive for dysphoric mood. Negative for confusion.        Assessments:  Environmental: mobile home, 4 steps  Functional Status: ambulatory with assist  Safety: fall precautions  Nutritional: adequate  Home Health/DME/Supplies: cane, dobutamine pump    Objective:   Physical Exam  Vitals reviewed.   Constitutional:       General: She is not in acute distress.     Appearance: She is obese. She is not ill-appearing.   HENT:      Head: Normocephalic and atraumatic.      Nose: Nose normal.      Mouth/Throat:      Mouth: Mucous membranes are moist.   Eyes:      Pupils: Pupils are equal, round, and reactive to light.   Cardiovascular:      Rate and Rhythm: Normal rate and regular rhythm.      Pulses: Normal pulses.      Heart sounds: Murmur heard.   Pulmonary:      Effort: Pulmonary effort is normal.      Breath sounds: Normal breath sounds.   Abdominal:      General: Bowel sounds are normal.      Palpations: Abdomen is soft.   Musculoskeletal:         General: Normal range of motion.      Cervical back: Normal range of  motion.      Right lower leg: Edema present.   Skin:     General: Skin is warm and dry.   Neurological:      Mental Status: She is alert and oriented to person, place, and time.      Motor: Weakness present.   Psychiatric:         Mood and Affect: Mood is depressed. Affect is tearful.         Behavior: Behavior normal.         Thought Content: Thought content normal.         Cognition and Memory: Cognition normal.         Vitals:    10/31/23 1300   BP: (!) 150/90   Pulse: 83   Resp: 16   SpO2: 97%     There is no height or weight on file to calculate BMI.    Assessment:     1. NICM (nonischemic cardiomyopathy)    2. Depression due to physical illness    3. Restrictive lung disease    4. Chronic pain of right knee    5. Advance care planning    6. Debility    7. Other reduced mobility    8. CKD (chronic kidney disease), stage IV    9. Type 2 diabetes mellitus with stage 4 chronic kidney disease, without long-term current use of insulin        Plan:     Ethical / Legal: Advance Care Planning   Code Status:  full  [place]:  Health care POA documentation   Other advance directive:  no.   Capacity to make medical decisions:  yes, Conflict none       Problem List Items Addressed This Visit          Psychiatric    Depression due to physical illness (Chronic)    Current Assessment & Plan     -restart lexapro  -will resubmit psych referral                Pulmonary    Restrictive lung disease    Current Assessment & Plan     -stable  -f/u with pulmonology at [facility]            Cardiac/Vascular    NICM (nonischemic cardiomyopathy) - Primary (Chronic)    Current Assessment & Plan     -encouraged to make urgent appt with cardiology to discuss need for dobutamine   -encouraged to ask for assistance from cardiology with obtaining second opinion  -there are conflicting issues here that are best addressed by cardiology  -needs medication clarification by specialists         Relevant Orders    Advanced Care Plan 30 Min (60962)        Renal/    CKD (chronic kidney disease), stage IV (Chronic)    Current Assessment & Plan     -f/u with nephrology  -avoid nephrotoxic medications              Endocrine    Type 2 diabetes mellitus with stage 4 chronic kidney disease, without long-term current use of insulin    Current Assessment & Plan     -currently taking ozempic, mounjaro on back order  -patient states both ozempic and mounjaro are prescribed for her  -f/u with endocrinology               Orthopedic    Chronic pain of right knee    Current Assessment & Plan     -f/u with PCP for chronic pain management              Palliative Care    Advance care planning    Current Assessment & Plan     -Remains full code    Advance Care Planning     Date: 10/31/2023    [facility]  I engaged the patient in a voluntary conversation about advance care planning and we specifically addressed what the goals of care would be moving forward, in light of the patient's change in clinical status, specifically end stage heart failure.  We did specifically address the patient's likely prognosis, which is poor.  We explored the patient's values and preferences for future care.  The patient endorses that what is most important right now is to focus on remaining as independent as possible, symptom/pain control and curative/life-prolongation (regardless of treatment burdens)    Accordingly, we have decided that the best plan to meet the patient's goals includes continuing with treatment    I did explain the role for hospice care at this stage of the patient's illness, including its ability to help the patient live with the best quality of life possible.  We will not be making a hospice referral.    I spent a total of 30 minutes engaging the patient in this advance care planning discussion.                   Other    Debility    Current Assessment & Plan     -stable, using cane           Other reduced mobility    Current Assessment & Plan     -due to chronic pain and joint  swelling  -cane in use  -stable           -I will follow up with the patient in 1 month and PRN  -she is to follow up with PCP and specialties     Were controlled substances prescribed?  No  Total visit time:   Follow Up Appointments:   Future Appointments   Date Time Provider Department Center   11/6/2023 12:30 PM CHAIR 01 [facility] STA CHEMO [facility]   11/9/2023  9:30 AM LAB, [facility]   11/27/2023  3:40 PM ORTHO CLINIC, JR [facility] ORTHO [place] [facility]   11/30/2023  8:00 AM Yaquelin Osorio NP [facility] C3HV [place]   1/9/2024  1:00 PM Sandra Hernandes MD [facility] PAL MED [address]   2/5/2024  1:00 PM Terese Patel MD [facility] RHEUM [place]   4/11/2024  1:00 PM Cyndee Mattson MD [facility] PULM [place] ACT   4/15/2024 10:00 AM LAB, [facility] STA LAB [facility]   4/22/2024  1:00 PM Vijay Basurto MD [facility] NEPHRO [place] FRNT   5/8/2024  2:00 PM Uziel Herman MD [facility] ENDOCR [address]       Signature: Yaquelin Osorio NP

## 2023-11-06 NOTE — ASSESSMENT & PLAN NOTE
-currently taking ozempic, mounjaro on back order  -patient states both ozempic and mounjaro are prescribed for her  -f/u with endocrinology

## 2023-11-06 NOTE — PATIENT INSTRUCTIONS
- Continue all medications, treatments and therapies as ordered.   - Follow all instructions, recommendations as discussed.  - Maintain Safety Precautions at all times.  - Attend all medical appointments as scheduled.  - For worsening symptoms: call Primary Care Physician or Nurse Practitioner.  - For emergencies, call 911 or immediately report to the nearest emergency room.

## 2023-11-06 NOTE — PROGRESS NOTES
1310  labs drawn from PICC line  good blood return noted.  Picc line dressing change done to Right upper arm using sterile technique.  No signs of infection noted.   Bio patch, stat lock device and tegaderm dressing reapplied.  Tolerated well   dc to home in stable condition

## 2023-11-06 NOTE — ASSESSMENT & PLAN NOTE
-encouraged to make urgent appt with cardiology to discuss need for dobutamine   -encouraged to ask for assistance from cardiology with obtaining second opinion  -there are conflicting issues here that are best addressed by cardiology  -needs medication clarification by specialists

## 2023-11-07 RX ORDER — ALLOPURINOL 300 MG/1
300 TABLET ORAL DAILY
Qty: 90 TABLET | Refills: 3 | Status: SHIPPED | OUTPATIENT
Start: 2023-11-07 | End: 2024-04-02 | Stop reason: SDUPTHER

## 2023-11-07 RX ORDER — LIDOCAINE 50 MG/G
1 PATCH TOPICAL DAILY
Qty: 30 PATCH | Refills: 2 | Status: SHIPPED | OUTPATIENT
Start: 2023-11-07 | End: 2023-11-29 | Stop reason: SDUPTHER

## 2023-11-07 RX ORDER — ERGOCALCIFEROL 1.25 MG/1
50000 CAPSULE ORAL
Qty: 12 CAPSULE | Refills: 1 | Status: SHIPPED | OUTPATIENT
Start: 2023-11-11 | End: 2023-11-29 | Stop reason: SDUPTHER

## 2023-11-07 RX ORDER — ATORVASTATIN CALCIUM 40 MG/1
40 TABLET, FILM COATED ORAL NIGHTLY
Qty: 90 TABLET | Refills: 3 | Status: SHIPPED | OUTPATIENT
Start: 2023-11-07 | End: 2023-11-29 | Stop reason: SDUPTHER

## 2023-11-07 RX ORDER — DICLOFENAC SODIUM 10 MG/G
GEL TOPICAL
Qty: 300 G | Refills: 1 | Status: SHIPPED | OUTPATIENT
Start: 2023-11-07 | End: 2023-11-29 | Stop reason: SDUPTHER

## 2023-11-07 RX ORDER — BLOOD-GLUCOSE CONTROL, NORMAL
EACH MISCELLANEOUS 3 TIMES DAILY
Qty: 100 EACH | Refills: 11 | Status: SHIPPED | OUTPATIENT
Start: 2023-11-07

## 2023-11-08 ENCOUNTER — TELEPHONE (OUTPATIENT)
Dept: FAMILY MEDICINE | Facility: CLINIC | Age: 58
End: 2023-11-08
Payer: MEDICAID

## 2023-11-08 DIAGNOSIS — M10.9 GOUT, ARTHRITIS: ICD-10-CM

## 2023-11-08 NOTE — TELEPHONE ENCOUNTER
----- Message from Gustavo Allen sent at 2023 10:33 AM CST -----  Contact: self  Hafsa Hawley  MRN: [id number]  : 1965  PCP: Cristobal Ann  Home Phone      [phone]  Work Phone      Not on file.  Mobile          [phone]      MESSAGE:   Patient starts hospice in a week and she needs all her medication in line for her nurse, she needs any medication that she is on or that is due for refills sent to [facility], please call and advise patient    [phone]

## 2023-11-09 ENCOUNTER — TELEPHONE (OUTPATIENT)
Dept: ENDOCRINOLOGY | Facility: CLINIC | Age: 58
End: 2023-11-09
Payer: MEDICAID

## 2023-11-09 ENCOUNTER — LAB VISIT (OUTPATIENT)
Dept: FAMILY MEDICINE | Facility: CLINIC | Age: 58
End: 2023-11-09
Payer: MEDICAID

## 2023-11-09 DIAGNOSIS — M10.9 GOUT, UNSPECIFIED: Primary | ICD-10-CM

## 2023-11-09 DIAGNOSIS — M10.9 GOUT, UNSPECIFIED CAUSE, UNSPECIFIED CHRONICITY, UNSPECIFIED SITE: ICD-10-CM

## 2023-11-09 LAB
ANION GAP SERPL CALC-SCNC: 12 MMOL/L (ref 8–16)
BUN SERPL-MCNC: 37 MG/DL (ref 6–20)
CALCIUM SERPL-MCNC: 9.5 MG/DL (ref 8.7–10.5)
CHLORIDE SERPL-SCNC: 106 MMOL/L (ref 95–110)
CO2 SERPL-SCNC: 25 MMOL/L (ref 23–29)
CREAT SERPL-MCNC: 2.4 MG/DL (ref 0.5–1.4)
EST. GFR  (NO RACE VARIABLE): 23 ML/MIN/1.73 M^2
GLUCOSE SERPL-MCNC: 123 MG/DL (ref 70–110)
POTASSIUM SERPL-SCNC: 4.2 MMOL/L (ref 3.5–5.1)
SODIUM SERPL-SCNC: 143 MMOL/L (ref 136–145)
URATE SERPL-MCNC: 4.3 MG/DL (ref 2.4–5.7)

## 2023-11-09 PROCEDURE — 99999PBSHW PR PBB SHADOW TECHNICAL ONLY FILED TO HB: Mod: PBBFAC,,,

## 2023-11-09 PROCEDURE — 80048 BASIC METABOLIC PNL TOTAL CA: CPT | Performed by: FAMILY MEDICINE

## 2023-11-09 PROCEDURE — 36415 COLL VENOUS BLD VENIPUNCTURE: CPT | Mod: PBBFAC

## 2023-11-09 PROCEDURE — 99999PBSHW PR PBB SHADOW TECHNICAL ONLY FILED TO HB: ICD-10-PCS | Mod: PBBFAC,,,

## 2023-11-09 PROCEDURE — 84550 ASSAY OF BLOOD/URIC ACID: CPT | Performed by: FAMILY MEDICINE

## 2023-11-09 NOTE — TELEPHONE ENCOUNTER
Left message on voicemail to contact office.        ----- Message from Uziel Herman MD sent at 2023 12:31 PM CST -----  Contact: Patient  I was only prescribing Ozempic which I see now was changed to Mounjaro by another provider    Jardiance was being prescribed by Cardiology    So, I will not send anything. I think PCP took care of it anyways.       ----- Message -----  From: Nabila Cordero MA  Sent: 2023  10:31 AM CST  To: Uziel Herman MD      ----- Message -----  From: Santa Campos  Sent: 2023  10:31 AM CST  To: Batsheva Diaz Staff    Hafsa Hawley  MRN: [id number]  : 1965  PCP: Cristobal Ann  Home Phone      [phone]  Work Phone      Not on file.  Mobile          [phone]      MESSAGE: Patient is transferring all her medications from [facility] in [place] to the [facility] but they won't release the medications with out authorization from the doctor.    PHONE; [phone]

## 2023-11-13 ENCOUNTER — INFUSION (OUTPATIENT)
Dept: INFUSION THERAPY | Facility: HOSPITAL | Age: 58
End: 2023-11-13
Attending: INTERNAL MEDICINE
Payer: MEDICAID

## 2023-11-13 VITALS
TEMPERATURE: 98 F | RESPIRATION RATE: 18 BRPM | HEART RATE: 93 BPM | SYSTOLIC BLOOD PRESSURE: 156 MMHG | DIASTOLIC BLOOD PRESSURE: 92 MMHG

## 2023-11-13 DIAGNOSIS — I42.9 CARDIOMYOPATHY, UNSPECIFIED TYPE: Primary | ICD-10-CM

## 2023-11-13 LAB
ALBUMIN SERPL BCP-MCNC: 3.7 G/DL (ref 3.5–5.2)
ALP SERPL-CCNC: 56 U/L (ref 55–135)
ALT SERPL W/O P-5'-P-CCNC: 13 U/L (ref 10–44)
ANION GAP SERPL CALC-SCNC: 11 MMOL/L (ref 8–16)
ANISOCYTOSIS BLD QL SMEAR: ABNORMAL
AST SERPL-CCNC: 19 U/L (ref 10–40)
BASOPHILS # BLD AUTO: 0.01 K/UL (ref 0–0.2)
BASOPHILS NFR BLD: 0.3 % (ref 0–1.9)
BILIRUB SERPL-MCNC: 1.5 MG/DL (ref 0.1–1)
BNP SERPL-MCNC: 1734 PG/ML (ref 0–99)
BUN SERPL-MCNC: 35 MG/DL (ref 6–20)
CALCIUM SERPL-MCNC: 9.4 MG/DL (ref 8.7–10.5)
CHLORIDE SERPL-SCNC: 112 MMOL/L (ref 95–110)
CO2 SERPL-SCNC: 23 MMOL/L (ref 23–29)
CREAT SERPL-MCNC: 1.8 MG/DL (ref 0.5–1.4)
DIFFERENTIAL METHOD: ABNORMAL
EOSINOPHIL # BLD AUTO: 0.1 K/UL (ref 0–0.5)
EOSINOPHIL NFR BLD: 3.9 % (ref 0–8)
ERYTHROCYTE [DISTWIDTH] IN BLOOD BY AUTOMATED COUNT: 29.4 % (ref 11.5–14.5)
EST. GFR  (NO RACE VARIABLE): 32 ML/MIN/1.73 M^2
GLUCOSE SERPL-MCNC: 79 MG/DL (ref 70–110)
HCT VFR BLD AUTO: 26.6 % (ref 37–48.5)
HGB BLD-MCNC: 8.3 G/DL (ref 12–16)
IMM GRANULOCYTES # BLD AUTO: 0.01 K/UL (ref 0–0.04)
IMM GRANULOCYTES NFR BLD AUTO: 0.3 % (ref 0–0.5)
LYMPHOCYTES # BLD AUTO: 0.5 K/UL (ref 1–4.8)
LYMPHOCYTES NFR BLD: 17.4 % (ref 18–48)
MAGNESIUM SERPL-MCNC: 1.8 MG/DL (ref 1.6–2.6)
MCH RBC QN AUTO: 18 PG (ref 27–31)
MCHC RBC AUTO-ENTMCNC: 31.2 G/DL (ref 32–36)
MCV RBC AUTO: 58 FL (ref 82–98)
MONOCYTES # BLD AUTO: 0.3 K/UL (ref 0.3–1)
MONOCYTES NFR BLD: 9 % (ref 4–15)
NEUTROPHILS # BLD AUTO: 2.1 K/UL (ref 1.8–7.7)
NEUTROPHILS NFR BLD: 69.1 % (ref 38–73)
NRBC BLD-RTO: 1 /100 WBC
OVALOCYTES BLD QL SMEAR: ABNORMAL
PHOSPHATE SERPL-MCNC: 2.8 MG/DL (ref 2.7–4.5)
PLATELET # BLD AUTO: 165 K/UL (ref 150–450)
PMV BLD AUTO: ABNORMAL FL (ref 9.2–12.9)
POLYCHROMASIA BLD QL SMEAR: ABNORMAL
POTASSIUM SERPL-SCNC: 3.9 MMOL/L (ref 3.5–5.1)
PROT SERPL-MCNC: 6.9 G/DL (ref 6–8.4)
RBC # BLD AUTO: 4.62 M/UL (ref 4–5.4)
SCHISTOCYTES BLD QL SMEAR: PRESENT
SODIUM SERPL-SCNC: 146 MMOL/L (ref 136–145)
SPHEROCYTES BLD QL SMEAR: ABNORMAL
TARGETS BLD QL SMEAR: ABNORMAL
WBC # BLD AUTO: 3.1 K/UL (ref 3.9–12.7)

## 2023-11-13 PROCEDURE — 83880 ASSAY OF NATRIURETIC PEPTIDE: CPT | Performed by: INTERNAL MEDICINE

## 2023-11-13 PROCEDURE — 36415 COLL VENOUS BLD VENIPUNCTURE: CPT | Performed by: INTERNAL MEDICINE

## 2023-11-13 PROCEDURE — 83735 ASSAY OF MAGNESIUM: CPT | Performed by: INTERNAL MEDICINE

## 2023-11-13 PROCEDURE — 80053 COMPREHEN METABOLIC PANEL: CPT | Performed by: INTERNAL MEDICINE

## 2023-11-13 PROCEDURE — 85025 COMPLETE CBC W/AUTO DIFF WBC: CPT | Performed by: INTERNAL MEDICINE

## 2023-11-13 PROCEDURE — 84100 ASSAY OF PHOSPHORUS: CPT | Performed by: INTERNAL MEDICINE

## 2023-11-13 NOTE — PROGRESS NOTES
1330  Right upper arm PICC line dressing changed using sterile technique   no signs of infection noted.  Stat lock device, bio patch, and tegaderm dressing reapplied.  Tolerated well  dc to home in stable condition

## 2023-11-13 NOTE — PROGRESS NOTES
5187  Blood drawn from L AC     PICC line with small amount blood return but not enough to waste prior to sample.  Flushed with saline. Pt used her home heparin dose

## 2023-11-14 ENCOUNTER — PATIENT MESSAGE (OUTPATIENT)
Dept: ENDOCRINOLOGY | Facility: CLINIC | Age: 58
End: 2023-11-14
Payer: MEDICAID

## 2023-11-14 ENCOUNTER — TELEPHONE (OUTPATIENT)
Dept: ENDOCRINOLOGY | Facility: CLINIC | Age: 58
End: 2023-11-14
Payer: MEDICAID

## 2023-11-14 RX ORDER — APIXABAN 2.5 MG/1
2.5 TABLET, FILM COATED ORAL 2 TIMES DAILY
Qty: 60 TABLET | Refills: 11 | Status: CANCELLED | OUTPATIENT
Start: 2023-11-14

## 2023-11-14 NOTE — TELEPHONE ENCOUNTER
----- Message from Gustavo Allen sent at 2023 11:00 AM CST -----  Contact: self  Hafsa Hawley  MRN: [id number]  : 1965  PCP: Cristobal Ann  Home Phone      [phone]  Work Phone      Not on file.  Mobile          [phone]      MESSAGE:   Please call and advise patient on why medication hasn't been sent in.    [phone]

## 2023-11-14 NOTE — TELEPHONE ENCOUNTER
Tried contacting pt, no answer,vm is full. Will reach out to pt via my chart.      ----- Message from Uziel Herman MD sent at 2023 12:17 PM CST -----  Contact: Patient  The reason I had not sent it is because I see Mounjaro put by someone else, is she on Mounjaro?      Only if not on Mounjaro, I will send ozempic    Lmk      ----- Message -----  From: Nabila Cordero MA  Sent: 2023  10:58 AM CST  To: Uziel Herman MD      ----- Message -----  From: Santa Campos  Sent: 2023  10:55 AM CST  To: Batsheva Diaz Staff    Hafsa Baum Chandan  MRN: [id number]  : 1965  PCP: Cristobal Ann  Home Phone      [phone]  Work Phone      Not on file.  Mobile          [phone]      MESSAGE: Patient needs a prescription of the Ozempic 0.2/ 0.5 mg sent to [facility]. She's been without it for about 3 weeks    PHONE; [phone]

## 2023-11-17 NOTE — TELEPHONE ENCOUNTER
----- Message from Yazmin Saleem RN sent at 8/22/2018 12:44 PM CDT -----  Contact: Pt      ----- Message -----  From: Padmini Oquendo  Sent: 8/22/2018  10:29 AM  To: Michael WISE Staff    Pt is requesting a call back in regards to her upcoming appt       Pt can be contacted at [phone]   Propranolol Pregnancy And Lactation Text: This medication is Pregnancy Category C and it isn't known if it is safe during pregnancy. It is excreted in breast milk.

## 2023-11-21 ENCOUNTER — PATIENT OUTREACH (OUTPATIENT)
Dept: ADMINISTRATIVE | Facility: HOSPITAL | Age: 58
End: 2023-11-21
Payer: MEDICAID

## 2023-11-21 NOTE — PROGRESS NOTES
Received external Eye Exam collected/completed on 10/24/2023, updated to .  Eye LA added to patient care team.

## 2023-11-22 ENCOUNTER — INFUSION (OUTPATIENT)
Dept: INFUSION THERAPY | Facility: HOSPITAL | Age: 58
End: 2023-11-22
Attending: STUDENT IN AN ORGANIZED HEALTH CARE EDUCATION/TRAINING PROGRAM
Payer: MEDICAID

## 2023-11-22 VITALS
HEART RATE: 97 BPM | TEMPERATURE: 97 F | DIASTOLIC BLOOD PRESSURE: 90 MMHG | SYSTOLIC BLOOD PRESSURE: 134 MMHG | RESPIRATION RATE: 20 BRPM

## 2023-11-22 DIAGNOSIS — I42.9 CARDIOMYOPATHY, UNSPECIFIED TYPE: Primary | ICD-10-CM

## 2023-11-22 LAB
ALBUMIN SERPL BCP-MCNC: 3.5 G/DL (ref 3.5–5.2)
ALP SERPL-CCNC: 56 U/L (ref 55–135)
ALT SERPL W/O P-5'-P-CCNC: 14 U/L (ref 10–44)
ANION GAP SERPL CALC-SCNC: 8 MMOL/L (ref 8–16)
ANISOCYTOSIS BLD QL SMEAR: ABNORMAL
AST SERPL-CCNC: 18 U/L (ref 10–40)
BASOPHILS # BLD AUTO: 0.01 K/UL (ref 0–0.2)
BASOPHILS NFR BLD: 0.2 % (ref 0–1.9)
BILIRUB SERPL-MCNC: 1.6 MG/DL (ref 0.1–1)
BNP SERPL-MCNC: 2267 PG/ML (ref 0–99)
BUN SERPL-MCNC: 33 MG/DL (ref 6–20)
CALCIUM SERPL-MCNC: 8.8 MG/DL (ref 8.7–10.5)
CHLORIDE SERPL-SCNC: 109 MMOL/L (ref 95–110)
CO2 SERPL-SCNC: 26 MMOL/L (ref 23–29)
CREAT SERPL-MCNC: 2 MG/DL (ref 0.5–1.4)
DACRYOCYTES BLD QL SMEAR: ABNORMAL
DIFFERENTIAL METHOD: ABNORMAL
EOSINOPHIL # BLD AUTO: 0.1 K/UL (ref 0–0.5)
EOSINOPHIL NFR BLD: 2.2 % (ref 0–8)
ERYTHROCYTE [DISTWIDTH] IN BLOOD BY AUTOMATED COUNT: 30.2 % (ref 11.5–14.5)
EST. GFR  (NO RACE VARIABLE): 28 ML/MIN/1.73 M^2
GLUCOSE SERPL-MCNC: 102 MG/DL (ref 70–110)
HCT VFR BLD AUTO: 27.4 % (ref 37–48.5)
HGB BLD-MCNC: 8.1 G/DL (ref 12–16)
IMM GRANULOCYTES # BLD AUTO: 0.02 K/UL (ref 0–0.04)
IMM GRANULOCYTES NFR BLD AUTO: 0.5 % (ref 0–0.5)
LYMPHOCYTES # BLD AUTO: 0.7 K/UL (ref 1–4.8)
LYMPHOCYTES NFR BLD: 15.7 % (ref 18–48)
MAGNESIUM SERPL-MCNC: 1.7 MG/DL (ref 1.6–2.6)
MCH RBC QN AUTO: 17.6 PG (ref 27–31)
MCHC RBC AUTO-ENTMCNC: 29.6 G/DL (ref 32–36)
MCV RBC AUTO: 59 FL (ref 82–98)
MONOCYTES # BLD AUTO: 0.3 K/UL (ref 0.3–1)
MONOCYTES NFR BLD: 7 % (ref 4–15)
NEUTROPHILS # BLD AUTO: 3.1 K/UL (ref 1.8–7.7)
NEUTROPHILS NFR BLD: 74.4 % (ref 38–73)
NRBC BLD-RTO: 2 /100 WBC
OVALOCYTES BLD QL SMEAR: ABNORMAL
PHOSPHATE SERPL-MCNC: 3.8 MG/DL (ref 2.7–4.5)
PLATELET # BLD AUTO: 173 K/UL (ref 150–450)
PLATELET BLD QL SMEAR: ABNORMAL
PMV BLD AUTO: ABNORMAL FL (ref 9.2–12.9)
POIKILOCYTOSIS BLD QL SMEAR: ABNORMAL
POLYCHROMASIA BLD QL SMEAR: ABNORMAL
POTASSIUM SERPL-SCNC: 3.6 MMOL/L (ref 3.5–5.1)
PROT SERPL-MCNC: 6.4 G/DL (ref 6–8.4)
RBC # BLD AUTO: 4.61 M/UL (ref 4–5.4)
SCHISTOCYTES BLD QL SMEAR: ABNORMAL
SCHISTOCYTES BLD QL SMEAR: PRESENT
SODIUM SERPL-SCNC: 143 MMOL/L (ref 136–145)
SPHEROCYTES BLD QL SMEAR: ABNORMAL
TARGETS BLD QL SMEAR: ABNORMAL
WBC # BLD AUTO: 4.15 K/UL (ref 3.9–12.7)

## 2023-11-22 PROCEDURE — 84100 ASSAY OF PHOSPHORUS: CPT | Performed by: STUDENT IN AN ORGANIZED HEALTH CARE EDUCATION/TRAINING PROGRAM

## 2023-11-22 PROCEDURE — 83735 ASSAY OF MAGNESIUM: CPT | Performed by: STUDENT IN AN ORGANIZED HEALTH CARE EDUCATION/TRAINING PROGRAM

## 2023-11-22 PROCEDURE — 36591 DRAW BLOOD OFF VENOUS DEVICE: CPT

## 2023-11-22 PROCEDURE — 80053 COMPREHEN METABOLIC PANEL: CPT | Performed by: STUDENT IN AN ORGANIZED HEALTH CARE EDUCATION/TRAINING PROGRAM

## 2023-11-22 PROCEDURE — 83880 ASSAY OF NATRIURETIC PEPTIDE: CPT | Performed by: STUDENT IN AN ORGANIZED HEALTH CARE EDUCATION/TRAINING PROGRAM

## 2023-11-22 PROCEDURE — 85025 COMPLETE CBC W/AUTO DIFF WBC: CPT | Performed by: STUDENT IN AN ORGANIZED HEALTH CARE EDUCATION/TRAINING PROGRAM

## 2023-11-22 NOTE — PROGRESS NOTES
1215  Blood drawn from PICC line.  Flushed with saline.  Pt flushed her home heparin.  Right upper arm PICC line dressing change done using sterile technique.  No signs of infection noted.  Stat lock device, bio patch, and tegaderm dressing reapplied.  Tolerated well   dc to home in stable condition

## 2023-11-24 ENCOUNTER — LAB VISIT (OUTPATIENT)
Dept: LAB | Facility: HOSPITAL | Age: 58
End: 2023-11-24
Attending: STUDENT IN AN ORGANIZED HEALTH CARE EDUCATION/TRAINING PROGRAM
Payer: MEDICAID

## 2023-11-24 DIAGNOSIS — R30.0 DYSURIA: ICD-10-CM

## 2023-11-24 DIAGNOSIS — R30.0 DYSURIA: Primary | ICD-10-CM

## 2023-11-24 LAB
BACTERIA #/AREA URNS HPF: ABNORMAL /HPF
BILIRUB UR QL STRIP: NEGATIVE
CLARITY UR: CLEAR
COLOR UR: YELLOW
GLUCOSE UR QL STRIP: ABNORMAL
HGB UR QL STRIP: ABNORMAL
HYALINE CASTS #/AREA URNS LPF: 3 /LPF
KETONES UR QL STRIP: NEGATIVE
LEUKOCYTE ESTERASE UR QL STRIP: NEGATIVE
MICROSCOPIC COMMENT: ABNORMAL
NITRITE UR QL STRIP: NEGATIVE
PH UR STRIP: 6 [PH] (ref 5–8)
PROT UR QL STRIP: ABNORMAL
RBC #/AREA URNS HPF: 0 /HPF (ref 0–4)
SP GR UR STRIP: 1.01 (ref 1–1.03)
SQUAMOUS #/AREA URNS HPF: 3 /HPF
URN SPEC COLLECT METH UR: ABNORMAL
UROBILINOGEN UR STRIP-ACNC: NEGATIVE EU/DL
WBC #/AREA URNS HPF: 4 /HPF (ref 0–5)

## 2023-11-24 PROCEDURE — 81000 URINALYSIS NONAUTO W/SCOPE: CPT | Performed by: STUDENT IN AN ORGANIZED HEALTH CARE EDUCATION/TRAINING PROGRAM

## 2023-11-24 NOTE — PROGRESS NOTES
"[place]:  [place] [facility]  A collaboration between [facility] and [facility]      PATIENT: Hafsa Hawley  MRN: [id number]  DATE: 7/11/2019    Subjective:      Chief Complaint: Follow-up   She has significant heart issues during his surveillance she was found to have incidental right adrenal lesion.  She was referred to me for evaluation of the adrenal lesions.  She has no complaints of significant high blood pressures.  She most of the time she has low blood pressure.  After consultation I ordered for plasma metanephrines which were all not significantly elevated. She does not have any symptoms of pheochromocytoma.  Her blood pressure is level with mostly hypertension than hypertension.  She does not have any headache palpitations or sweating    He has significant cardiac issues.  Continues to have fatigue shortness of breath chest pain and been she has been followed by the transplant team at the [facility]    Vitals:   Vitals:    07/11/19 1256   BP: 112/74   Pulse: 66   Resp: 20   Temp: 98.1 °F (36.7 °C)   Weight: 96.3 kg (212 lb 6.6 oz)   Height: 5' 6" (1.676 m)        Karnofsky Score:     Diagnosis: No diagnosis found.     Oncologic History:     Interval History:     Past Medical History:  Past Medical History:   Diagnosis Date    Anemia     Anticoagulant long-term use     Arthritis     Atrial fibrillation     Congestive heart failure     COPD (chronic obstructive pulmonary disease)     Deep vein thrombosis     elevated bilirubin d/t Gilbert's syndrome     confirmed by [place] genetic testing, evaluated by hepatology    Encounter for blood transfusion     GERD (gastroesophageal reflux disease)     Hypertension     Pheochromocytoma, malignant     Right kidney mass     Sleep apnea     Thyroid disease        Past Surgical History:  Past Surgical History:   Procedure Laterality Date    Ablation, " "SVT, Accessory Pathway N/A 2019    Performed by Bob Pretty MD at [facility] EP LAB    APPENDECTOMY      BIOPSY, LIVER, TRANSJUGULAR APPROACH N/A 10/24/2018    Performed by [facility] Diagnostic Provider at [facility] OR 2ND FLR    CARDIAC DEFIBRILLATOR PLACEMENT Left 2016    EYE SURGERY      due to running tears    FRACTURE SURGERY Left     hand 5th digit    HEART CATH-RIGHT Right 2016    Performed by Rakesh Garcia MD at [facility] CATH LAB    HYSTERECTOMY      INSERTION-ICD-SINGLE N/A 2016    Performed by Bob Pretty MD at [facility] CATH LAB    KNEE SURGERY Left 2016    hematoma    LIVER BIOPSY  10/24/2018    Minimal steatosis, predominantly macrovesicular, 1%, Minimal nonspecific portal inflammation, no fibrosis. No findings on biopsy to explain elevated bilirubin levels. Could be d/t Gilbert's =?- hemolysis    PROCEDURE PROLAPSE HEMORRHOID (PPH) N/A 3/7/2017    Performed by GEORGE Rothman MD at [facility] OR    Right heart cath Right 2018    Performed by Benson Cortez MD at [facility] CATH LAB       Family History:  Family History   Problem Relation Age of Onset    Cancer Mother         pancreatic CA early 50's    Heart disease Father          MI in late 50's    Hypertension Father     Heart disease Sister     Heart disease Brother     Cirrhosis Brother         alcoholic    Heart disease Sister     Heart disease Brother     Hypertension Brother     Diabetes Brother        Allergies:  Review of patient's allergies indicates:   Allergen Reactions    Penicillins Hives    Percocet [oxycodone-acetaminophen] Other (See Comments)     Nausea, Dizziness, Anxiety.  "I don't like how it makes me feel."   Given Hydromorphone 0.5mg IVP  Without problems.    Diovan hct [valsartan-hydrochlorothiazide] Other (See Comments)     Causes coughing    Irinotecan      Pt has homozygosity for the TA7 promoter variant that places this individual at significantly increased risk for   severe neutropenia (grade " 4) when treated with the standard dose of irinotecan (risk approximately 50%).   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 Metabolism of other drugs not listed here may also be impacted by UGT1A1 enzyme activity.       Tramadol Nausea And Vomiting       Medications:  Current Outpatient Medications   Medication Sig Dispense Refill    albuterol-ipratropium 2.5mg-0.5mg/3mL (DUO-NEB) 0.5 mg-3 mg(2.5 mg base)/3 mL nebulizer solution Take 1 mL by nebulization every 6 (six) hours as needed for Wheezing or Shortness of Breath. 1 Box 2    ALPRAZolam (XANAX) 1 MG tablet Take 1 mg by mouth nightly as needed for Anxiety.      apixaban (ELIQUIS) 5 mg Tab Take 1 tablet (5 mg total) by mouth 2 (two) times daily. 60 tablet 0    atorvastatin (LIPITOR) 40 MG tablet Take 1 tablet (40 mg total) by mouth once daily. 90 tablet 3    b complex vitamins tablet Take 1 tablet by mouth once daily.      carvedilol (COREG) 25 MG tablet Take 1 tablet by mouth 2 (two) times daily.      cyanocobalamin, vitamin B-12, 50 mcg tablet Take 5 mcg by mouth once daily.      ENTRESTO 49-51 mg per tablet TAKE 1 TABLET BY MOUTH TWICE A DAY 60 tablet 0    ergocalciferol (ERGOCALCIFEROL) 50,000 unit Cap ergocalciferol (vitamin D2) 50,000 unit capsule   Take 1 capsule every week by oral route.      fluticasone-salmeterol diskus inhaler 100-50 mcg Inhale 1 puff into the lungs 2 (two) times daily. Controller      iron-vit c-vit b12-folic acid (IRON-C PLUS) tablet iron      isosorbide dinitrate (ISORDIL) 20 MG tablet Take 1 tablet (20 mg total) by mouth 2 (two) times daily. Please check that this is what you are already taking at home.      NITROSTAT 0.4 mg SL tablet Take 2.5 tablets (1 mg total) by mouth every 5 (five) minutes as needed for Chest pain. No more than 3 tablets in 15 minutes.      pantoprazole (PROTONIX) 40 MG  tablet Take 1 tablet by mouth once daily.  5    potassium chloride (KLOR-CON) 10 MEQ TbSR Take 1 tablet (10 mEq total) by mouth once daily.      rOPINIRole (REQUIP) 0.5 MG tablet Take 0.5 mg by mouth once daily.      spironolactone (ALDACTONE) 25 MG tablet Take 1 tablet (25 mg total) by mouth once daily. 30 tablet 1    VENTOLIN HFA 90 mcg/actuation inhaler Inhale 2 puffs into the lungs 2 (two) times daily as needed.   11    walker Misc 1 Device by Misc.(Non-Drug; Combo Route) route as needed. 1 each 0    furosemide (LASIX) 80 MG tablet Take 1 tablet (80 mg total) by mouth 2 (two) times daily. TAKE EXTRA DOSE FOR GAIN OF 2 OR MORE POUND WEIGHT IN 1 DAY OR 5 POUNDS IN 1 WEEK. 60 tablet 1     No current facility-administered medications for this visit.      Facility-Administered Medications Ordered in Other Visits   Medication Dose Route Frequency Provider Last Rate Last Dose    0.9%  NaCl infusion   Intravenous Continuous Corinna Hayes NP        vancomycin in dextrose 5 % 1 gram/250 mL IVPB 1,000 mg  1,000 mg Intravenous On Call Procedure Corinna Hayes NP   1,000 mg at 05/23/19 0736       Review of Systems   Constitutional: Positive for fatigue. Negative for activity change, appetite change, chills, diaphoresis and unexpected weight change.   HENT: Negative for mouth sores, rhinorrhea, sinus pressure, sinus pain, sneezing, sore throat, tinnitus and trouble swallowing.    Eyes: Negative for pain, discharge, itching and visual disturbance.   Respiratory: Positive for cough, chest tightness and shortness of breath. Negative for apnea.    Cardiovascular: Positive for chest pain, palpitations and leg swelling.   Gastrointestinal: Negative for abdominal pain, blood in stool, constipation, nausea, rectal pain and vomiting.   Endocrine: Negative.    Genitourinary: Negative.    Musculoskeletal: Negative.    Skin: Negative for color change, pallor, rash and wound.   Allergic/Immunologic: Negative.     Neurological: Negative for syncope, facial asymmetry, numbness and headaches.   Hematological: Does not bruise/bleed easily.   Psychiatric/Behavioral: Negative for behavioral problems, decreased concentration and dysphoric mood.      Objective:      Physical Exam   Constitutional: She is oriented to person, place, and time. She appears well-developed and well-nourished.   HENT:   Head: Normocephalic.   Right Ear: External ear normal.   Left Ear: External ear normal.   Eyes: Pupils are equal, round, and reactive to light. Conjunctivae and EOM are normal.   Neck: Normal range of motion.   Cardiovascular: Normal rate and regular rhythm.   Pulmonary/Chest: Effort normal and breath sounds normal.   Abdominal: Soft. Bowel sounds are normal. She exhibits no mass. There is no guarding.   Musculoskeletal: Normal range of motion.   Neurological: She is alert and oriented to person, place, and time.   Skin: Skin is warm and dry.   Psychiatric: She has a normal mood and affect. Her behavior is normal.      Assessment:       No diagnosis found.    Laboratory Data:   on 7/11/2019 14:32   CT Abdomen Pelvis W Wo Contrast   Order: [id number]   Status:  Final result   Visible to patient:  No (Not Released) Next appt:  07/18/2019 at 08:40 AM in Family Medicine (Edward Damon MD) Dx:  Adrenal incidentaloma   Details     Reading Physician Reading Date Result Priority   Jean Pierre Mccann MD 6/28/2019       Narrative     EXAMINATION:  CT ABDOMEN PELVIS W WO CONTRAST    CLINICAL HISTORY:  restage, pheochromocytoma;Other specified disorders of adrenal gland    TECHNIQUE:  Low dose axial images, sagittal and coronal reformations were obtained from the lung bases to the pubic symphysis before and following the IV administration of 100 mL of Omnipaque 350.  30 mL of oral Omnipaque 350 was also administered.    COMPARISON:  06/25/2018.    FINDINGS:  There are transvenous leads identified within the right atrium and ventricle.  The lung  bases are clear.  The bones are intact.  There is no evidence for acute fracture or bone destruction.    The liver does not appear enlarged.  No focal abnormalities of the liver are identified.  The gallbladder is surgically absent.  There is no evidence for intrahepatic or extrahepatic biliary dilatation.  There is a lobulated contour of the spleen which is unchanged.  The stomach and pancreas both appear unremarkable.  Once again, a right adrenal mass is identified measuring approximately 4.0 x 3.0 cm in size, not significantly changed when compared to prior examination dated 06/25/2018.  The mass is only mildly increased in size when compared to prior examination dated 04/25/2008 when the mass measured 3.3 cm in greatest transverse dimension.  There is a 1.3 cm hyperdense cyst within the right kidney and this is unchanged.  There is no evidence for hydronephrosis.  No renal calculi are identified.  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  The abdominal aorta tapers normally without aneurysmal dilatation.  The appendix is not visualized, however there are no CT findings to suggest appendicitis.  There are a few colonic diverticula identified without radiographic evidence for acute diverticulitis.  The uterus is absent.  No abnormal adnexal masses are identified.  No ascites is identified.  There is no evidence for pelvic or inguinal lymphadenopathy.      Impression       Stable 4 cm right adrenal nodule.    Stable 1.3 cm hyperdense right renal cyst.    Colonic diverticulosis without evidence for acute diverticulitis.    S/p cholecystectomy.  Status post hysterectomy.      Electronically signed by: Jean Pierre Mccann MD  Date: 06/28/2019  Time: 12:41           Results for OMERO WASHBURN (MRN [id number]) as of 7/11/2019 19:59   Ref. Range 6/21/2019 11:32 6/21/2019 11:36   WBC Latest Ref Range: 3.90 - 12.70 K/uL 4.41    RBC Latest Ref Range: 4.00 - 5.40 M/uL 5.32    Hemoglobin Latest  Ref Range: 12.0 - 16.0 g/dL 9.2 (L)    Hematocrit Latest Ref Range: 37.0 - 48.5 % 27.9 (L)    MCV Latest Ref Range: 82 - 98 fL 52 (L)    MCH Latest Ref Range: 27.0 - 31.0 pg 17.3 (L)    MCHC Latest Ref Range: 32.0 - 36.0 g/dL 33.0    RDW Latest Ref Range: 11.5 - 14.5 % 26.5 (H)    Platelets Latest Ref Range: 150 - 350 K/uL 225    MPV Latest Ref Range: 9.2 - 12.9 fL SEE COMMENT    Platelet Estimate Unknown Appears normal    Gran% Latest Ref Range: 38.0 - 73.0 % 62.8    Gran # (ANC) Latest Ref Range: 1.8 - 7.7 K/uL 2.8    Lymph% Latest Ref Range: 18.0 - 48.0 % 26.1    Lymph # Latest Ref Range: 1.0 - 4.8 K/uL 1.2    Mono% Latest Ref Range: 4.0 - 15.0 % 7.7    Mono # Latest Ref Range: 0.3 - 1.0 K/uL 0.3    Eosinophil% Latest Ref Range: 0.0 - 8.0 % 3.4    Eos # Latest Ref Range: 0.0 - 0.5 K/uL 0.2    Basophil% Latest Ref Range: 0.0 - 1.9 % 0.5    Baso # Latest Ref Range: 0.00 - 0.20 K/uL 0.02    Aniso Unknown Moderate    Poik Unknown Moderate    Poly Unknown Occasional    Hypo Unknown Occasional    Large/Giant Platelets Unknown Present    Differential Method Unknown Automated    Fragmented Cells Unknown Occasional    Protime Latest Ref Range: 9.0 - 12.5 sec  13.3 (H)   Coumadin Monitoring INR Latest Ref Range: 0.8 - 1.2   1.1   Sodium Latest Ref Range: 136 - 145 mmol/L 145    Potassium Latest Ref Range: 3.5 - 5.1 mmol/L 3.9    Chloride Latest Ref Range: 95 - 110 mmol/L 111 (H)    CO2 Latest Ref Range: 23 - 29 mmol/L 26    Anion Gap Latest Ref Range: 8 - 16 mmol/L 8    BUN, Bld Latest Ref Range: 7 - 17 mg/dL 27 (H)    Creatinine Latest Ref Range: 0.50 - 1.40 mg/dL 0.99    eGFR if non African American Latest Ref Range: >60 mL/min/1.73 m^2 >60.0    eGFR if African American Latest Ref Range: >60 mL/min/1.73 m^2 >60.0    Glucose Latest Ref Range: 70 - 110 mg/dL 131 (H)    Calcium Latest Ref Range: 8.7 - 10.5 mg/dL 9.2    Alkaline Phosphatase Latest Ref Range: 38 - 126 U/L 64    PROTEIN TOTAL Latest Ref Range: 6.0 - 8.4 g/dL  7.5    Albumin Latest Ref Range: 3.5 - 5.2 g/dL 3.9    BILIRUBIN TOTAL Latest Ref Range: 0.1 - 1.0 mg/dL 1.8 (H)    AST Latest Ref Range: 15 - 46 U/L 22    ALT Latest Ref Range: 10 - 44 U/L 16    Troponin I Latest Ref Range: 0.012 - 0.034 ng/mL 0.068 (H)    NT-proBNP Latest Ref Range: 5 - 900 pg/mL 987 (H)        Impression:  53-year-old female with significant cardiac issues are has a defibrillator and on the transplant list and during workup was found to have incidental adrenal lesion on the right side with no complaints at all.  Her biochemical markers are not significantly abnormal a mildly elevated however not consistent with pheochromocytoma.    She has adrenal lesion which has been stable since 2008.  The size of the adrenal lesion is about 4 cm.  Since it is stable will continue to wait any intervention has a higher risk because of her generalized poor medical state with respect to the heart.    Will follow up in 1 year time with CT scan   Will obtain a repeat laboratory parameters of pheochromocytoma that is plasma metanephrines today also will obtain cortisol and aldosterone level  Plan:       Incidental Hillary adrenal with a significant cardiac issues.    Her continue conservative management will continue to watch.    Will order laboratory parameters today  Follow-up 1 year with CT scan.  She has defibrillator which is not compatible for MRI    I clearly explained to her the reasons and rationale for this approach she understands she herself wants to proceed with conservative management as she knows that she has other multiple medical problems                  DAVIDA Canas MD, FACS   Associate Professor of Surgery, [facility]   Neuroendocrine Surgery, Hepatic/Pancreatic & General Surgery   [address]   [place] [address]   ph. [phone]; [phone]   fax. [phone]   Right chest mediport/yes

## 2023-11-27 ENCOUNTER — INFUSION (OUTPATIENT)
Dept: INFUSION THERAPY | Facility: HOSPITAL | Age: 58
End: 2023-11-27
Attending: INTERNAL MEDICINE
Payer: MEDICAID

## 2023-11-27 VITALS
TEMPERATURE: 97 F | SYSTOLIC BLOOD PRESSURE: 127 MMHG | HEART RATE: 98 BPM | RESPIRATION RATE: 18 BRPM | DIASTOLIC BLOOD PRESSURE: 82 MMHG | OXYGEN SATURATION: 96 %

## 2023-11-27 DIAGNOSIS — I42.9 CARDIOMYOPATHY, UNSPECIFIED TYPE: Primary | ICD-10-CM

## 2023-11-27 LAB
ALBUMIN SERPL BCP-MCNC: 3.5 G/DL (ref 3.5–5.2)
ALP SERPL-CCNC: 58 U/L (ref 55–135)
ALT SERPL W/O P-5'-P-CCNC: 14 U/L (ref 10–44)
ANION GAP SERPL CALC-SCNC: 9 MMOL/L (ref 8–16)
AST SERPL-CCNC: 18 U/L (ref 10–40)
BASOPHILS # BLD AUTO: 0.02 K/UL (ref 0–0.2)
BASOPHILS NFR BLD: 0.5 % (ref 0–1.9)
BILIRUB SERPL-MCNC: 1.7 MG/DL (ref 0.1–1)
BNP SERPL-MCNC: 2561 PG/ML (ref 0–99)
BUN SERPL-MCNC: 41 MG/DL (ref 6–20)
CALCIUM SERPL-MCNC: 9.2 MG/DL (ref 8.7–10.5)
CHLORIDE SERPL-SCNC: 106 MMOL/L (ref 95–110)
CO2 SERPL-SCNC: 27 MMOL/L (ref 23–29)
CREAT SERPL-MCNC: 2.3 MG/DL (ref 0.5–1.4)
DIFFERENTIAL METHOD: ABNORMAL
EOSINOPHIL # BLD AUTO: 0.1 K/UL (ref 0–0.5)
EOSINOPHIL NFR BLD: 1.7 % (ref 0–8)
ERYTHROCYTE [DISTWIDTH] IN BLOOD BY AUTOMATED COUNT: 30.3 % (ref 11.5–14.5)
EST. GFR  (NO RACE VARIABLE): 24 ML/MIN/1.73 M^2
GLUCOSE SERPL-MCNC: 162 MG/DL (ref 70–110)
HCT VFR BLD AUTO: 29.2 % (ref 37–48.5)
HGB BLD-MCNC: 8.7 G/DL (ref 12–16)
IMM GRANULOCYTES # BLD AUTO: 0.06 K/UL (ref 0–0.04)
IMM GRANULOCYTES NFR BLD AUTO: 1.4 % (ref 0–0.5)
LYMPHOCYTES # BLD AUTO: 0.7 K/UL (ref 1–4.8)
LYMPHOCYTES NFR BLD: 17.3 % (ref 18–48)
MAGNESIUM SERPL-MCNC: 1.8 MG/DL (ref 1.6–2.6)
MCH RBC QN AUTO: 17.9 PG (ref 27–31)
MCHC RBC AUTO-ENTMCNC: 29.8 G/DL (ref 32–36)
MCV RBC AUTO: 60 FL (ref 82–98)
MONOCYTES # BLD AUTO: 0.3 K/UL (ref 0.3–1)
MONOCYTES NFR BLD: 7.8 % (ref 4–15)
NEUTROPHILS # BLD AUTO: 3 K/UL (ref 1.8–7.7)
NEUTROPHILS NFR BLD: 71.3 % (ref 38–73)
NRBC BLD-RTO: 8 /100 WBC
PHOSPHATE SERPL-MCNC: 3.6 MG/DL (ref 2.7–4.5)
PLATELET # BLD AUTO: 202 K/UL (ref 150–450)
PMV BLD AUTO: ABNORMAL FL (ref 9.2–12.9)
POTASSIUM SERPL-SCNC: 4.6 MMOL/L (ref 3.5–5.1)
PROT SERPL-MCNC: 6.6 G/DL (ref 6–8.4)
RBC # BLD AUTO: 4.86 M/UL (ref 4–5.4)
SODIUM SERPL-SCNC: 142 MMOL/L (ref 136–145)
WBC # BLD AUTO: 4.23 K/UL (ref 3.9–12.7)

## 2023-11-27 PROCEDURE — 84100 ASSAY OF PHOSPHORUS: CPT | Performed by: INTERNAL MEDICINE

## 2023-11-27 PROCEDURE — 83880 ASSAY OF NATRIURETIC PEPTIDE: CPT | Mod: 91 | Performed by: INTERNAL MEDICINE

## 2023-11-27 PROCEDURE — 36415 COLL VENOUS BLD VENIPUNCTURE: CPT | Performed by: INTERNAL MEDICINE

## 2023-11-27 PROCEDURE — 85025 COMPLETE CBC W/AUTO DIFF WBC: CPT | Mod: 91 | Performed by: INTERNAL MEDICINE

## 2023-11-27 PROCEDURE — 80053 COMPREHEN METABOLIC PANEL: CPT | Mod: 91 | Performed by: INTERNAL MEDICINE

## 2023-11-27 PROCEDURE — 83735 ASSAY OF MAGNESIUM: CPT | Performed by: INTERNAL MEDICINE

## 2023-11-27 NOTE — PROGRESS NOTES
1330  Pt request to go to ER for increasing SOB.  States having some chest tightness at times.  Feeling increase tiredness.  Pt brought to ER via WC  Report given to Jelena

## 2023-11-27 NOTE — PROGRESS NOTES
1315  PICC line dressing changed using sterile technique.  No signs of infection noted.  Stat lock device, bio patch and tegaderm dressing reapplied.  Tolerated well

## 2023-11-28 ENCOUNTER — TELEPHONE (OUTPATIENT)
Dept: ORTHOPEDICS | Facility: CLINIC | Age: 58
End: 2023-11-28
Payer: MEDICAID

## 2023-11-28 NOTE — TELEPHONE ENCOUNTER
----- Message from Wendie Michel PA-C sent at 2023  1:17 PM CST -----  Contact: Patient  I just put in an internal rheumatology appointment, not sure what closest [facility] rheumat[facility] is. I can send to Srikanth if she wants.    ----- Message -----  From: Taylor Dominguez MA  Sent: 2023  11:51 AM CST  To: Wendie Michel PA-C    You sent in a referral for rheumatology.  ----- Message -----  From: Santa Campos  Sent: 2023  10:34 AM CST  To: Anand Pressley Staff    Hafsa Hawley  MRN: [id number]  : 1965  PCP: Cristobal Ann.  Home Phone      [phone]  Work Phone      Not on file.  Mobile          [phone]      MESSAGE:Patient says she was referred to a doctor in [place] and she wants to look for a doctor closer because that's too far of a distance to travel for her.    PHONE; [phone]

## 2023-11-28 NOTE — TELEPHONE ENCOUNTER
Attempted to call the patient about rheumatology referral. Left voicemail for the patient to call us back to let us know what she would like to do.

## 2023-12-04 ENCOUNTER — INFUSION (OUTPATIENT)
Dept: INFUSION THERAPY | Facility: HOSPITAL | Age: 58
End: 2023-12-04
Attending: INTERNAL MEDICINE
Payer: MEDICAID

## 2023-12-04 VITALS
TEMPERATURE: 98 F | HEART RATE: 87 BPM | RESPIRATION RATE: 22 BRPM | SYSTOLIC BLOOD PRESSURE: 129 MMHG | DIASTOLIC BLOOD PRESSURE: 92 MMHG

## 2023-12-04 DIAGNOSIS — I42.9 CARDIOMYOPATHY, UNSPECIFIED TYPE: Primary | ICD-10-CM

## 2023-12-04 LAB
ALBUMIN SERPL BCP-MCNC: 3.5 G/DL (ref 3.5–5.2)
ALP SERPL-CCNC: 54 U/L (ref 55–135)
ALT SERPL W/O P-5'-P-CCNC: 16 U/L (ref 10–44)
ANION GAP SERPL CALC-SCNC: 8 MMOL/L (ref 8–16)
ANISOCYTOSIS BLD QL SMEAR: ABNORMAL
AST SERPL-CCNC: 19 U/L (ref 10–40)
BASOPHILS # BLD AUTO: 0.03 K/UL (ref 0–0.2)
BASOPHILS NFR BLD: 0.6 % (ref 0–1.9)
BILIRUB SERPL-MCNC: 1.7 MG/DL (ref 0.1–1)
BNP SERPL-MCNC: 2764 PG/ML (ref 0–99)
BUN SERPL-MCNC: 55 MG/DL (ref 6–20)
CALCIUM SERPL-MCNC: 9.3 MG/DL (ref 8.7–10.5)
CHLORIDE SERPL-SCNC: 104 MMOL/L (ref 95–110)
CO2 SERPL-SCNC: 29 MMOL/L (ref 23–29)
CREAT SERPL-MCNC: 2.6 MG/DL (ref 0.5–1.4)
DACRYOCYTES BLD QL SMEAR: ABNORMAL
DIFFERENTIAL METHOD: ABNORMAL
EOSINOPHIL # BLD AUTO: 0.1 K/UL (ref 0–0.5)
EOSINOPHIL NFR BLD: 1 % (ref 0–8)
ERYTHROCYTE [DISTWIDTH] IN BLOOD BY AUTOMATED COUNT: 34.5 % (ref 11.5–14.5)
EST. GFR  (NO RACE VARIABLE): 21 ML/MIN/1.73 M^2
GLUCOSE SERPL-MCNC: 131 MG/DL (ref 70–110)
HCT VFR BLD AUTO: 29.4 % (ref 37–48.5)
HGB BLD-MCNC: 8.6 G/DL (ref 12–16)
HYPOCHROMIA BLD QL SMEAR: ABNORMAL
IMM GRANULOCYTES # BLD AUTO: 0.04 K/UL (ref 0–0.04)
IMM GRANULOCYTES NFR BLD AUTO: 0.8 % (ref 0–0.5)
LYMPHOCYTES # BLD AUTO: 0.6 K/UL (ref 1–4.8)
LYMPHOCYTES NFR BLD: 12.2 % (ref 18–48)
MAGNESIUM SERPL-MCNC: 2 MG/DL (ref 1.6–2.6)
MCH RBC QN AUTO: 17.4 PG (ref 27–31)
MCHC RBC AUTO-ENTMCNC: 29.3 G/DL (ref 32–36)
MCV RBC AUTO: 59 FL (ref 82–98)
MONOCYTES # BLD AUTO: 0.4 K/UL (ref 0.3–1)
MONOCYTES NFR BLD: 7.9 % (ref 4–15)
NEUTROPHILS # BLD AUTO: 3.8 K/UL (ref 1.8–7.7)
NEUTROPHILS NFR BLD: 77.5 % (ref 38–73)
NRBC BLD-RTO: 12 /100 WBC
OVALOCYTES BLD QL SMEAR: ABNORMAL
PHOSPHATE SERPL-MCNC: 4 MG/DL (ref 2.7–4.5)
PLATELET # BLD AUTO: 182 K/UL (ref 150–450)
PLATELET BLD QL SMEAR: ABNORMAL
PMV BLD AUTO: ABNORMAL FL (ref 9.2–12.9)
POIKILOCYTOSIS BLD QL SMEAR: ABNORMAL
POLYCHROMASIA BLD QL SMEAR: ABNORMAL
POTASSIUM SERPL-SCNC: 4.5 MMOL/L (ref 3.5–5.1)
PROT SERPL-MCNC: 6.3 G/DL (ref 6–8.4)
RBC # BLD AUTO: 4.95 M/UL (ref 4–5.4)
SCHISTOCYTES BLD QL SMEAR: ABNORMAL
SCHISTOCYTES BLD QL SMEAR: PRESENT
SODIUM SERPL-SCNC: 141 MMOL/L (ref 136–145)
SPHEROCYTES BLD QL SMEAR: ABNORMAL
TARGETS BLD QL SMEAR: ABNORMAL
WBC # BLD AUTO: 4.92 K/UL (ref 3.9–12.7)

## 2023-12-04 PROCEDURE — 84100 ASSAY OF PHOSPHORUS: CPT | Performed by: INTERNAL MEDICINE

## 2023-12-04 PROCEDURE — 83735 ASSAY OF MAGNESIUM: CPT | Performed by: INTERNAL MEDICINE

## 2023-12-04 PROCEDURE — 36415 COLL VENOUS BLD VENIPUNCTURE: CPT | Performed by: INTERNAL MEDICINE

## 2023-12-04 PROCEDURE — 85025 COMPLETE CBC W/AUTO DIFF WBC: CPT | Performed by: INTERNAL MEDICINE

## 2023-12-04 PROCEDURE — 36591 DRAW BLOOD OFF VENOUS DEVICE: CPT | Mod: 59

## 2023-12-04 PROCEDURE — 83880 ASSAY OF NATRIURETIC PEPTIDE: CPT | Performed by: INTERNAL MEDICINE

## 2023-12-04 PROCEDURE — 80053 COMPREHEN METABOLIC PANEL: CPT | Performed by: INTERNAL MEDICINE

## 2023-12-04 NOTE — PROGRESS NOTES
PICC line dressing changed using sterile technique.  No signs of infection noted.  Stat lock device, bio patch and tegaderm dressing reapplied.  Tolerated well   Blood drawn from PICC line.  Flushed with saline.  Pt flushed with her home heparin.

## 2023-12-05 DIAGNOSIS — K21.9 GASTROESOPHAGEAL REFLUX DISEASE, UNSPECIFIED WHETHER ESOPHAGITIS PRESENT: ICD-10-CM

## 2023-12-05 DIAGNOSIS — E11.22 TYPE 2 DIABETES MELLITUS WITH STAGE 4 CHRONIC KIDNEY DISEASE, WITHOUT LONG-TERM CURRENT USE OF INSULIN: ICD-10-CM

## 2023-12-05 DIAGNOSIS — N18.4 TYPE 2 DIABETES MELLITUS WITH STAGE 4 CHRONIC KIDNEY DISEASE, WITHOUT LONG-TERM CURRENT USE OF INSULIN: ICD-10-CM

## 2023-12-05 RX ORDER — PANTOPRAZOLE SODIUM 40 MG/1
TABLET, DELAYED RELEASE ORAL
Qty: 30 TABLET | Refills: 11 | Status: SHIPPED | OUTPATIENT
Start: 2023-12-05 | End: 2024-01-09

## 2023-12-05 RX ORDER — BLOOD-GLUCOSE SENSOR
1 EACH MISCELLANEOUS
Qty: 3 EACH | Refills: 11 | Status: SHIPPED | OUTPATIENT
Start: 2023-12-05 | End: 2024-12-04

## 2023-12-05 NOTE — TELEPHONE ENCOUNTER
LOV 10/26/2023    Pt requesting RX refill for   pantoprazole (PROTONIX) 40 MG tablet    pharmacy confirmed   rx pending  please advise

## 2023-12-05 NOTE — TELEPHONE ENCOUNTER
No care due was identified.  [facility] Embedded Care Due Messages. Reference number: [id number].   12/05/2023 12:27:49 AM CST

## 2023-12-06 ENCOUNTER — TELEPHONE (OUTPATIENT)
Dept: FAMILY MEDICINE | Facility: CLINIC | Age: 58
End: 2023-12-06
Payer: MEDICAID

## 2023-12-06 DIAGNOSIS — I13.0 HYPERTENSIVE CARDIOVASCULAR-RENAL DISEASE, STAGE 1-4 OR UNSPECIFIED CHRONIC KIDNEY DISEASE, WITH HEART FAILURE: Primary | ICD-10-CM

## 2023-12-06 NOTE — TELEPHONE ENCOUNTER
PT requesting referrals to [facility] for her heart and kidney.     Pended for approval

## 2023-12-06 NOTE — TELEPHONE ENCOUNTER
----- Message from Gustavo Allen sent at 2023  2:09 PM CST -----  Contact: self  Hafsa Hawley  MRN: [id number]  : 1965  PCP: Cristobal Ann  Home Phone      [phone]  Work Phone      Not on file.  Mobile          [phone]      MESSAGE:   Patient is wanting a referral sent to [facility], for her heart and kidneys      [phone]

## 2023-12-07 ENCOUNTER — TELEPHONE (OUTPATIENT)
Dept: ELECTROPHYSIOLOGY | Facility: CLINIC | Age: 58
End: 2023-12-07
Payer: MEDICAID

## 2023-12-07 ENCOUNTER — HOSPITAL ENCOUNTER (EMERGENCY)
Facility: HOSPITAL | Age: 58
Discharge: ANOTHER HOSPITAL | End: 2023-12-08
Attending: SURGERY
Payer: MEDICAID

## 2023-12-07 ENCOUNTER — NURSE TRIAGE (OUTPATIENT)
Dept: ADMINISTRATIVE | Facility: CLINIC | Age: 58
End: 2023-12-07
Payer: MEDICAID

## 2023-12-07 DIAGNOSIS — I50.9 CONGESTIVE HEART FAILURE, UNSPECIFIED HF CHRONICITY, UNSPECIFIED HEART FAILURE TYPE: Primary | ICD-10-CM

## 2023-12-07 DIAGNOSIS — R06.02 SOB (SHORTNESS OF BREATH): ICD-10-CM

## 2023-12-07 DIAGNOSIS — R52 PAIN: ICD-10-CM

## 2023-12-07 DIAGNOSIS — N18.4 STAGE 4 CHRONIC KIDNEY DISEASE: ICD-10-CM

## 2023-12-07 DIAGNOSIS — R07.9 CHEST PAIN, UNSPECIFIED TYPE: ICD-10-CM

## 2023-12-07 DIAGNOSIS — R11.2 NAUSEA AND VOMITING, UNSPECIFIED VOMITING TYPE: ICD-10-CM

## 2023-12-07 LAB
ALBUMIN SERPL BCP-MCNC: 3.6 G/DL (ref 3.5–5.2)
ALP SERPL-CCNC: 56 U/L (ref 55–135)
ALT SERPL W/O P-5'-P-CCNC: 28 U/L (ref 10–44)
ANION GAP SERPL CALC-SCNC: 12 MMOL/L (ref 8–16)
AST SERPL-CCNC: 22 U/L (ref 10–40)
BASOPHILS # BLD AUTO: 0.02 K/UL (ref 0–0.2)
BASOPHILS NFR BLD: 0.4 % (ref 0–1.9)
BILIRUB SERPL-MCNC: 1.6 MG/DL (ref 0.1–1)
BNP SERPL-MCNC: 3383 PG/ML (ref 0–99)
BUN SERPL-MCNC: 74 MG/DL (ref 6–20)
CALCIUM SERPL-MCNC: 9 MG/DL (ref 8.7–10.5)
CHLORIDE SERPL-SCNC: 108 MMOL/L (ref 95–110)
CK SERPL-CCNC: 56 U/L (ref 20–180)
CO2 SERPL-SCNC: 24 MMOL/L (ref 23–29)
CREAT SERPL-MCNC: 2.4 MG/DL (ref 0.5–1.4)
DACRYOCYTES BLD QL SMEAR: ABNORMAL
DIFFERENTIAL METHOD: ABNORMAL
EOSINOPHIL # BLD AUTO: 0.1 K/UL (ref 0–0.5)
EOSINOPHIL NFR BLD: 2 % (ref 0–8)
ERYTHROCYTE [DISTWIDTH] IN BLOOD BY AUTOMATED COUNT: 33.7 % (ref 11.5–14.5)
EST. GFR  (NO RACE VARIABLE): 23 ML/MIN/1.73 M^2
GLUCOSE SERPL-MCNC: 118 MG/DL (ref 70–110)
HCT VFR BLD AUTO: 32.4 % (ref 37–48.5)
HGB BLD-MCNC: 9.2 G/DL (ref 12–16)
HYPOCHROMIA BLD QL SMEAR: ABNORMAL
IMM GRANULOCYTES # BLD AUTO: 0.03 K/UL (ref 0–0.04)
IMM GRANULOCYTES NFR BLD AUTO: 0.6 % (ref 0–0.5)
INFLUENZA A, MOLECULAR: NEGATIVE
INFLUENZA B, MOLECULAR: NEGATIVE
LACTATE SERPL-SCNC: 1.7 MMOL/L (ref 0.5–2.2)
LYMPHOCYTES # BLD AUTO: 0.9 K/UL (ref 1–4.8)
LYMPHOCYTES NFR BLD: 18.4 % (ref 18–48)
MCH RBC QN AUTO: 18.2 PG (ref 27–31)
MCHC RBC AUTO-ENTMCNC: 28.4 G/DL (ref 32–36)
MCV RBC AUTO: 64 FL (ref 82–98)
MONOCYTES # BLD AUTO: 0.4 K/UL (ref 0.3–1)
MONOCYTES NFR BLD: 7.6 % (ref 4–15)
NEUTROPHILS # BLD AUTO: 3.5 K/UL (ref 1.8–7.7)
NEUTROPHILS NFR BLD: 71 % (ref 38–73)
NRBC BLD-RTO: 8 /100 WBC
OVALOCYTES BLD QL SMEAR: ABNORMAL
PLATELET # BLD AUTO: 197 K/UL (ref 150–450)
PLATELET BLD QL SMEAR: ABNORMAL
PMV BLD AUTO: ABNORMAL FL (ref 9.2–12.9)
POIKILOCYTOSIS BLD QL SMEAR: ABNORMAL
POLYCHROMASIA BLD QL SMEAR: ABNORMAL
POTASSIUM SERPL-SCNC: 4.2 MMOL/L (ref 3.5–5.1)
PROCALCITONIN SERPL IA-MCNC: 0.35 NG/ML
PROT SERPL-MCNC: 6.6 G/DL (ref 6–8.4)
RBC # BLD AUTO: 5.06 M/UL (ref 4–5.4)
SARS-COV-2 RDRP RESP QL NAA+PROBE: NEGATIVE
SCHISTOCYTES BLD QL SMEAR: ABNORMAL
SCHISTOCYTES BLD QL SMEAR: PRESENT
SODIUM SERPL-SCNC: 144 MMOL/L (ref 136–145)
SPECIMEN SOURCE: NORMAL
TARGETS BLD QL SMEAR: ABNORMAL
TROPONIN I SERPL DL<=0.01 NG/ML-MCNC: 1.04 NG/ML (ref 0–0.03)
WBC # BLD AUTO: 4.88 K/UL (ref 3.9–12.7)

## 2023-12-07 PROCEDURE — U0002 COVID-19 LAB TEST NON-CDC: HCPCS | Performed by: SURGERY

## 2023-12-07 PROCEDURE — 82550 ASSAY OF CK (CPK): CPT | Performed by: SURGERY

## 2023-12-07 PROCEDURE — 96375 TX/PRO/DX INJ NEW DRUG ADDON: CPT

## 2023-12-07 PROCEDURE — 94640 AIRWAY INHALATION TREATMENT: CPT

## 2023-12-07 PROCEDURE — 99285 EMERGENCY DEPT VISIT HI MDM: CPT | Mod: 25

## 2023-12-07 PROCEDURE — 93010 ELECTROCARDIOGRAM REPORT: CPT | Mod: ,,, | Performed by: INTERNAL MEDICINE

## 2023-12-07 PROCEDURE — 85025 COMPLETE CBC W/AUTO DIFF WBC: CPT | Performed by: SURGERY

## 2023-12-07 PROCEDURE — 63600175 PHARM REV CODE 636 W HCPCS: Performed by: SURGERY

## 2023-12-07 PROCEDURE — 80053 COMPREHEN METABOLIC PANEL: CPT | Performed by: SURGERY

## 2023-12-07 PROCEDURE — 99900035 HC TECH TIME PER 15 MIN (STAT)

## 2023-12-07 PROCEDURE — 83880 ASSAY OF NATRIURETIC PEPTIDE: CPT | Performed by: SURGERY

## 2023-12-07 PROCEDURE — 93005 ELECTROCARDIOGRAM TRACING: CPT

## 2023-12-07 PROCEDURE — 96365 THER/PROPH/DIAG IV INF INIT: CPT

## 2023-12-07 PROCEDURE — 25000003 PHARM REV CODE 250: Performed by: SURGERY

## 2023-12-07 PROCEDURE — 25000242 PHARM REV CODE 250 ALT 637 W/ HCPCS: Performed by: SURGERY

## 2023-12-07 PROCEDURE — 84145 PROCALCITONIN (PCT): CPT | Performed by: SURGERY

## 2023-12-07 PROCEDURE — 84484 ASSAY OF TROPONIN QUANT: CPT | Performed by: SURGERY

## 2023-12-07 PROCEDURE — 87040 BLOOD CULTURE FOR BACTERIA: CPT | Mod: 59 | Performed by: SURGERY

## 2023-12-07 PROCEDURE — 93010 EKG 12-LEAD: ICD-10-PCS | Mod: ,,, | Performed by: INTERNAL MEDICINE

## 2023-12-07 PROCEDURE — 83605 ASSAY OF LACTIC ACID: CPT | Performed by: SURGERY

## 2023-12-07 PROCEDURE — 87502 INFLUENZA DNA AMP PROBE: CPT | Performed by: SURGERY

## 2023-12-07 RX ORDER — FUROSEMIDE 10 MG/ML
40 INJECTION INTRAMUSCULAR; INTRAVENOUS
Status: COMPLETED | OUTPATIENT
Start: 2023-12-07 | End: 2023-12-07

## 2023-12-07 RX ORDER — LEVALBUTEROL 1.25 MG/.5ML
1.25 SOLUTION, CONCENTRATE RESPIRATORY (INHALATION)
Status: COMPLETED | OUTPATIENT
Start: 2023-12-07 | End: 2023-12-07

## 2023-12-07 RX ORDER — HYDROCODONE BITARTRATE AND ACETAMINOPHEN 10; 325 MG/1; MG/1
1 TABLET ORAL EVERY 6 HOURS PRN
Qty: 120 TABLET | Refills: 0 | Status: SHIPPED | OUTPATIENT
Start: 2023-12-07 | End: 2024-01-25 | Stop reason: SDUPTHER

## 2023-12-07 RX ORDER — ATORVASTATIN CALCIUM 40 MG/1
40 TABLET, FILM COATED ORAL
Status: COMPLETED | OUTPATIENT
Start: 2023-12-07 | End: 2023-12-07

## 2023-12-07 RX ORDER — PANTOPRAZOLE SODIUM 40 MG/1
40 TABLET, DELAYED RELEASE ORAL
Status: COMPLETED | OUTPATIENT
Start: 2023-12-07 | End: 2023-12-07

## 2023-12-07 RX ORDER — ONDANSETRON 2 MG/ML
4 INJECTION INTRAMUSCULAR; INTRAVENOUS
Status: COMPLETED | OUTPATIENT
Start: 2023-12-07 | End: 2023-12-07

## 2023-12-07 RX ORDER — ASPIRIN 325 MG
325 TABLET ORAL
Status: COMPLETED | OUTPATIENT
Start: 2023-12-07 | End: 2023-12-07

## 2023-12-07 RX ORDER — FUROSEMIDE 10 MG/ML
20 INJECTION INTRAMUSCULAR; INTRAVENOUS
Status: COMPLETED | OUTPATIENT
Start: 2023-12-07 | End: 2023-12-07

## 2023-12-07 RX ADMIN — PANTOPRAZOLE SODIUM 40 MG: 40 TABLET, DELAYED RELEASE ORAL at 09:12

## 2023-12-07 RX ADMIN — ONDANSETRON 4 MG: 2 INJECTION INTRAMUSCULAR; INTRAVENOUS at 09:12

## 2023-12-07 RX ADMIN — ATORVASTATIN CALCIUM 40 MG: 40 TABLET, FILM COATED ORAL at 09:12

## 2023-12-07 RX ADMIN — NITROGLYCERIN 1 INCH: 20 OINTMENT TOPICAL at 09:12

## 2023-12-07 RX ADMIN — FUROSEMIDE 40 MG: 10 INJECTION, SOLUTION INTRAVENOUS at 09:12

## 2023-12-07 RX ADMIN — LEVALBUTEROL 1.25 MG: 1.25 SOLUTION, CONCENTRATE RESPIRATORY (INHALATION) at 10:12

## 2023-12-07 RX ADMIN — ASPIRIN 325 MG ORAL TABLET 325 MG: 325 PILL ORAL at 09:12

## 2023-12-07 RX ADMIN — FUROSEMIDE 20 MG: 10 INJECTION, SOLUTION INTRAMUSCULAR; INTRAVENOUS at 11:12

## 2023-12-07 NOTE — TELEPHONE ENCOUNTER
Returned the pt's call on this afternoon.  Pt was calling to see if everything with her ICD was ok.  Informed the pt that all is WNL.  Understanding was verbalized.  Pt appreciated the call.

## 2023-12-07 NOTE — TELEPHONE ENCOUNTER
----- Message from Padmini Dunne MA sent at 12/7/2023 12:04 PM CST -----  This patient called about her device, wondering if there has been any activity. She would like a call to see when her next remote transmission is. I do not see it scheduled. Please call.

## 2023-12-08 ENCOUNTER — HOSPITAL ENCOUNTER (INPATIENT)
Facility: HOSPITAL | Age: 58
LOS: 7 days | Discharge: HOME | DRG: 291 | End: 2023-12-15
Attending: INTERNAL MEDICINE | Admitting: INTERNAL MEDICINE
Payer: MEDICAID

## 2023-12-08 VITALS
HEART RATE: 94 BPM | SYSTOLIC BLOOD PRESSURE: 125 MMHG | RESPIRATION RATE: 15 BRPM | TEMPERATURE: 98 F | BODY MASS INDEX: 31.41 KG/M2 | WEIGHT: 184 LBS | OXYGEN SATURATION: 96 % | DIASTOLIC BLOOD PRESSURE: 87 MMHG | HEIGHT: 64 IN

## 2023-12-08 DIAGNOSIS — I50.42 CHRONIC COMBINED SYSTOLIC AND DIASTOLIC HEART FAILURE: Primary | Chronic | ICD-10-CM

## 2023-12-08 DIAGNOSIS — I50.9 HEART FAILURE: ICD-10-CM

## 2023-12-08 LAB
ALBUMIN SERPL BCP-MCNC: 3.5 G/DL (ref 3.5–5.2)
ALP SERPL-CCNC: 54 U/L (ref 55–135)
ALT SERPL W/O P-5'-P-CCNC: 24 U/L (ref 10–44)
ANION GAP SERPL CALC-SCNC: 14 MMOL/L (ref 8–16)
ANISOCYTOSIS BLD QL SMEAR: ABNORMAL
AST SERPL-CCNC: 24 U/L (ref 10–40)
BASOPHILS # BLD AUTO: 0.02 K/UL (ref 0–0.2)
BASOPHILS NFR BLD: 0.4 % (ref 0–1.9)
BILIRUB SERPL-MCNC: 1.6 MG/DL (ref 0.1–1)
BUN SERPL-MCNC: 79 MG/DL (ref 6–20)
CALCIUM SERPL-MCNC: 9.1 MG/DL (ref 8.7–10.5)
CHLORIDE SERPL-SCNC: 107 MMOL/L (ref 95–110)
CO2 SERPL-SCNC: 20 MMOL/L (ref 23–29)
CREAT SERPL-MCNC: 2.5 MG/DL (ref 0.5–1.4)
DACRYOCYTES BLD QL SMEAR: ABNORMAL
DIFFERENTIAL METHOD: ABNORMAL
EOSINOPHIL # BLD AUTO: 0.1 K/UL (ref 0–0.5)
EOSINOPHIL NFR BLD: 2.5 % (ref 0–8)
ERYTHROCYTE [DISTWIDTH] IN BLOOD BY AUTOMATED COUNT: 34.1 % (ref 11.5–14.5)
EST. GFR  (NO RACE VARIABLE): 21.7 ML/MIN/1.73 M^2
GLUCOSE SERPL-MCNC: 153 MG/DL (ref 70–110)
HCT VFR BLD AUTO: 29.6 % (ref 37–48.5)
HGB BLD-MCNC: 9 G/DL (ref 12–16)
HYPOCHROMIA BLD QL SMEAR: ABNORMAL
IMM GRANULOCYTES # BLD AUTO: 0.04 K/UL (ref 0–0.04)
IMM GRANULOCYTES NFR BLD AUTO: 0.8 % (ref 0–0.5)
LACTATE SERPL-SCNC: 1.8 MMOL/L (ref 0.5–2.2)
LYMPHOCYTES # BLD AUTO: 0.8 K/UL (ref 1–4.8)
LYMPHOCYTES NFR BLD: 16.5 % (ref 18–48)
MAGNESIUM SERPL-MCNC: 2.2 MG/DL (ref 1.6–2.6)
MCH RBC QN AUTO: 19 PG (ref 27–31)
MCHC RBC AUTO-ENTMCNC: 30.4 G/DL (ref 32–36)
MCV RBC AUTO: 63 FL (ref 82–98)
MONOCYTES # BLD AUTO: 0.4 K/UL (ref 0.3–1)
MONOCYTES NFR BLD: 8.5 % (ref 4–15)
NEUTROPHILS # BLD AUTO: 3.4 K/UL (ref 1.8–7.7)
NEUTROPHILS NFR BLD: 71.3 % (ref 38–73)
NRBC BLD-RTO: 7 /100 WBC
OVALOCYTES BLD QL SMEAR: ABNORMAL
PLATELET # BLD AUTO: 178 K/UL (ref 150–450)
PMV BLD AUTO: ABNORMAL FL (ref 9.2–12.9)
POCT GLUCOSE: 185 MG/DL (ref 70–110)
POIKILOCYTOSIS BLD QL SMEAR: ABNORMAL
POLYCHROMASIA BLD QL SMEAR: ABNORMAL
POTASSIUM SERPL-SCNC: 4.8 MMOL/L (ref 3.5–5.1)
PROT SERPL-MCNC: 6.4 G/DL (ref 6–8.4)
RBC # BLD AUTO: 4.73 M/UL (ref 4–5.4)
SCHISTOCYTES BLD QL SMEAR: ABNORMAL
SCHISTOCYTES BLD QL SMEAR: PRESENT
SODIUM SERPL-SCNC: 141 MMOL/L (ref 136–145)
SPHEROCYTES BLD QL SMEAR: ABNORMAL
TARGETS BLD QL SMEAR: ABNORMAL
WBC # BLD AUTO: 4.73 K/UL (ref 3.9–12.7)

## 2023-12-08 PROCEDURE — 83605 ASSAY OF LACTIC ACID: CPT | Performed by: STUDENT IN AN ORGANIZED HEALTH CARE EDUCATION/TRAINING PROGRAM

## 2023-12-08 PROCEDURE — 94761 N-INVAS EAR/PLS OXIMETRY MLT: CPT

## 2023-12-08 PROCEDURE — 25000003 PHARM REV CODE 250: Performed by: STUDENT IN AN ORGANIZED HEALTH CARE EDUCATION/TRAINING PROGRAM

## 2023-12-08 PROCEDURE — 99900035 HC TECH TIME PER 15 MIN (STAT)

## 2023-12-08 PROCEDURE — 80053 COMPREHEN METABOLIC PANEL: CPT | Performed by: STUDENT IN AN ORGANIZED HEALTH CARE EDUCATION/TRAINING PROGRAM

## 2023-12-08 PROCEDURE — 83735 ASSAY OF MAGNESIUM: CPT | Performed by: STUDENT IN AN ORGANIZED HEALTH CARE EDUCATION/TRAINING PROGRAM

## 2023-12-08 PROCEDURE — 27000221 HC OXYGEN, UP TO 24 HOURS

## 2023-12-08 PROCEDURE — 20600001 HC STEP DOWN PRIVATE ROOM

## 2023-12-08 PROCEDURE — 36415 COLL VENOUS BLD VENIPUNCTURE: CPT | Performed by: STUDENT IN AN ORGANIZED HEALTH CARE EDUCATION/TRAINING PROGRAM

## 2023-12-08 PROCEDURE — 25000242 PHARM REV CODE 250 ALT 637 W/ HCPCS: Performed by: SURGERY

## 2023-12-08 PROCEDURE — 99223 PR INITIAL HOSPITAL CARE,LEVL III: ICD-10-PCS | Mod: ,,, | Performed by: INTERNAL MEDICINE

## 2023-12-08 PROCEDURE — 94640 AIRWAY INHALATION TREATMENT: CPT

## 2023-12-08 PROCEDURE — 99223 1ST HOSP IP/OBS HIGH 75: CPT | Mod: ,,, | Performed by: INTERNAL MEDICINE

## 2023-12-08 PROCEDURE — 63600175 PHARM REV CODE 636 W HCPCS: Performed by: STUDENT IN AN ORGANIZED HEALTH CARE EDUCATION/TRAINING PROGRAM

## 2023-12-08 PROCEDURE — 85025 COMPLETE CBC W/AUTO DIFF WBC: CPT | Performed by: STUDENT IN AN ORGANIZED HEALTH CARE EDUCATION/TRAINING PROGRAM

## 2023-12-08 RX ORDER — DOBUTAMINE HYDROCHLORIDE 400 MG/100ML
5 INJECTION INTRAVENOUS CONTINUOUS
Status: DISCONTINUED | OUTPATIENT
Start: 2023-12-08 | End: 2023-12-08 | Stop reason: HOSPADM

## 2023-12-08 RX ORDER — GUAIFENESIN 100 MG/5ML
200 SOLUTION ORAL EVERY 4 HOURS PRN
Status: DISCONTINUED | OUTPATIENT
Start: 2023-12-08 | End: 2023-12-15 | Stop reason: HOSPADM

## 2023-12-08 RX ORDER — BENZONATATE 100 MG/1
100 CAPSULE ORAL 3 TIMES DAILY PRN
Status: DISCONTINUED | OUTPATIENT
Start: 2023-12-08 | End: 2023-12-15 | Stop reason: HOSPADM

## 2023-12-08 RX ORDER — DOBUTAMINE HYDROCHLORIDE 400 MG/100ML
5 INJECTION INTRAVENOUS CONTINUOUS
Status: DISCONTINUED | OUTPATIENT
Start: 2023-12-08 | End: 2023-12-15 | Stop reason: HOSPADM

## 2023-12-08 RX ORDER — DIPHENHYDRAMINE HCL 25 MG
25 CAPSULE ORAL EVERY 6 HOURS PRN
Status: DISCONTINUED | OUTPATIENT
Start: 2023-12-08 | End: 2023-12-15 | Stop reason: HOSPADM

## 2023-12-08 RX ORDER — ALBUTEROL SULFATE 90 UG/1
2 AEROSOL, METERED RESPIRATORY (INHALATION) EVERY 6 HOURS PRN
Status: DISCONTINUED | OUTPATIENT
Start: 2023-12-08 | End: 2023-12-15 | Stop reason: HOSPADM

## 2023-12-08 RX ORDER — ONDANSETRON 4 MG/1
4 TABLET, ORALLY DISINTEGRATING ORAL EVERY 8 HOURS PRN
Status: DISCONTINUED | OUTPATIENT
Start: 2023-12-08 | End: 2023-12-15 | Stop reason: HOSPADM

## 2023-12-08 RX ORDER — GUAIFENESIN 100 MG/5ML
100 SOLUTION ORAL
Status: COMPLETED | OUTPATIENT
Start: 2023-12-08 | End: 2023-12-08

## 2023-12-08 RX ORDER — ALLOPURINOL 300 MG/1
300 TABLET ORAL DAILY
Status: DISCONTINUED | OUTPATIENT
Start: 2023-12-09 | End: 2023-12-15 | Stop reason: HOSPADM

## 2023-12-08 RX ORDER — LEVALBUTEROL 1.25 MG/.5ML
1.25 SOLUTION, CONCENTRATE RESPIRATORY (INHALATION)
Status: COMPLETED | OUTPATIENT
Start: 2023-12-08 | End: 2023-12-08

## 2023-12-08 RX ORDER — AMIODARONE HYDROCHLORIDE 200 MG/1
200 TABLET ORAL DAILY
Status: DISCONTINUED | OUTPATIENT
Start: 2023-12-09 | End: 2023-12-15 | Stop reason: HOSPADM

## 2023-12-08 RX ORDER — METOPROLOL SUCCINATE 50 MG/1
50 TABLET, EXTENDED RELEASE ORAL DAILY
Status: DISCONTINUED | OUTPATIENT
Start: 2023-12-09 | End: 2023-12-10

## 2023-12-08 RX ORDER — LEVOFLOXACIN 5 MG/ML
750 INJECTION, SOLUTION INTRAVENOUS
Status: DISCONTINUED | OUTPATIENT
Start: 2023-12-08 | End: 2023-12-08 | Stop reason: HOSPADM

## 2023-12-08 RX ORDER — ATORVASTATIN CALCIUM 40 MG/1
40 TABLET, FILM COATED ORAL NIGHTLY
Status: DISCONTINUED | OUTPATIENT
Start: 2023-12-08 | End: 2023-12-15 | Stop reason: HOSPADM

## 2023-12-08 RX ORDER — FUROSEMIDE 10 MG/ML
80 INJECTION INTRAMUSCULAR; INTRAVENOUS ONCE
Status: COMPLETED | OUTPATIENT
Start: 2023-12-08 | End: 2023-12-08

## 2023-12-08 RX ORDER — HEPARIN SODIUM 5000 [USP'U]/ML
5000 INJECTION, SOLUTION INTRAVENOUS; SUBCUTANEOUS EVERY 8 HOURS
Status: DISCONTINUED | OUTPATIENT
Start: 2023-12-08 | End: 2023-12-08

## 2023-12-08 RX ORDER — SODIUM CHLORIDE 0.9 % (FLUSH) 0.9 %
10 SYRINGE (ML) INJECTION
Status: DISCONTINUED | OUTPATIENT
Start: 2023-12-08 | End: 2023-12-15 | Stop reason: HOSPADM

## 2023-12-08 RX ORDER — FUROSEMIDE 10 MG/ML
40 INJECTION INTRAMUSCULAR; INTRAVENOUS
Status: COMPLETED | OUTPATIENT
Start: 2023-12-08 | End: 2023-12-08

## 2023-12-08 RX ADMIN — LEVALBUTEROL 1.25 MG: 1.25 SOLUTION, CONCENTRATE RESPIRATORY (INHALATION) at 04:12

## 2023-12-08 RX ADMIN — FUROSEMIDE 80 MG: 10 INJECTION, SOLUTION INTRAVENOUS at 09:12

## 2023-12-08 RX ADMIN — DOBUTAMINE IN DEXTROSE 5 MCG/KG/MIN: 400 INJECTION, SOLUTION INTRAVENOUS at 09:12

## 2023-12-08 RX ADMIN — ATORVASTATIN CALCIUM 40 MG: 40 TABLET, FILM COATED ORAL at 09:12

## 2023-12-08 RX ADMIN — SACUBITRIL AND VALSARTAN 1 TABLET: 49; 51 TABLET, FILM COATED ORAL at 09:12

## 2023-12-08 RX ADMIN — FUROSEMIDE 40 MG: 10 INJECTION, SOLUTION INTRAVENOUS at 08:12

## 2023-12-08 RX ADMIN — GUAIFENESIN 100 MG: 200 SOLUTION ORAL at 02:12

## 2023-12-08 RX ADMIN — DOBUTAMINE IN DEXTROSE 5 MCG/KG/MIN: 400 INJECTION, SOLUTION INTRAVENOUS at 06:12

## 2023-12-08 RX ADMIN — GUAIFENESIN 200 MG: 200 SOLUTION ORAL at 09:12

## 2023-12-08 RX ADMIN — ONDANSETRON 4 MG: 4 TABLET, ORALLY DISINTEGRATING ORAL at 10:12

## 2023-12-08 RX ADMIN — CEFTRIAXONE 1 G: 1 INJECTION, POWDER, FOR SOLUTION INTRAMUSCULAR; INTRAVENOUS at 11:12

## 2023-12-08 RX ADMIN — LEVOFLOXACIN 750 MG: 750 INJECTION, SOLUTION INTRAVENOUS at 09:12

## 2023-12-08 RX ADMIN — APIXABAN 5 MG: 5 TABLET, FILM COATED ORAL at 09:12

## 2023-12-08 RX ADMIN — FUROSEMIDE 10 MG/HR: 10 INJECTION, SOLUTION INTRAMUSCULAR; INTRAVENOUS at 09:12

## 2023-12-08 NOTE — ED NOTES
Pt notified staff of her having spare bag of Dobutamine in personal belongings. Per pharmacy, bag placed in fridge with patient label.

## 2023-12-08 NOTE — ED NOTES
Patient had a rhythm change to V-tach then back to previous rhythm. Patient states she felt it in her chest. MD Jr notified. No new orders at this time.

## 2023-12-08 NOTE — PROGRESS NOTES
Pharmacist Renal Dose Adjustment Note    Hafsa Hawley is a 58 y.o. female being treated with the medication LEVAQUIN    Patient Data:    Vital Signs (Most Recent):  Temp: 98.6 °F (37 °C) (12/08/23 0640)  Pulse: 96 (12/08/23 0817)  Resp: 17 (12/08/23 0817)  BP: 119/66 (12/08/23 0817)  SpO2: 99 % (12/08/23 0817) Vital Signs (72h Range):  Temp:  [98.6 °F (37 °C)]   Pulse:  []   Resp:  [13-22]   BP: (115-146)/()   SpO2:  [89 %-99 %]      Recent Labs   Lab 12/04/23  1244 12/07/23 2039   CREATININE 2.6* 2.4*     Serum creatinine: 2.4 mg/dL (H) 12/07/23 2039  Estimated creatinine clearance: 26.7 mL/min (A)    Medication: LEVAQUIN dose: 500MG frequency Q24H  will be changed to medication: LEVAQUIN dose: 750MG  frequency: Q48H    Pharmacist's Name: Gale Robertson, PharmD   Pharmacist's Extension: [phone]

## 2023-12-08 NOTE — ED NOTES
Report called to LAURIE Le for room 349 at [facility]. RN given HPI, current vitals and POC. No questions or concerns raised by receiving RN/floor.

## 2023-12-08 NOTE — ED PROVIDER NOTES
"Encounter Date: 12/7/2023       History     Chief Complaint   Patient presents with    Shortness of Breath     Pt to ED via [facility] with c/o worsening of SOB and abdominal distention. Reports recently seen ED at [facility] without improvement. Continuous Dobutamine infusing to PICC.     Hafsa Hawley is a 58 y.o. female that presents with chest pain & shortness of breath  Chest pain & shortness of breath with nausea vomiting with long history of CHF noted  Patient with long history of ischemic cardiomyopathy with a continues Dobutrex drip  Patient states that she feels like she is & volume overload despite daily diuretic use  Normal sinus rhythm on EKG with no definitive ST changes on ER evaluation today    The history is provided by the patient, the EMS personnel and a relative.     Review of patient's allergies indicates:   Allergen Reactions    Penicillins Hives and Other (See Comments)    Iodinated contrast media Nausea And Vomiting    Oxycodone-acetaminophen Other (See Comments)     Nausea, Dizziness, Anxiety.  "I don't like how it makes me feel."   Given Hydromorphone 0.5mg IVP  Without problems.  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 Metabolism of other drugs not listed here may also be impacted by UGT1A1 enzyme activity.       Tramadol Nausea And Vomiting and Other (See Comments)     Other reaction(s): Other (See Comments)    Valsartan Other (See " Comments)     Past Medical History:   Diagnosis Date    Anemia     Arthritis     Atrial fibrillation     OAC    Chronic respiratory failure with hypoxia, on home oxygen therapy     2L with activity, off at rest.  Per Pulm  no overt evidence of ILD or COPD on PFTs and CT to explain O2 needs.    CKD (chronic kidney disease), stage IV 05/08/2018    Congestive heart failure     s/p AICD placement,    Deep vein thrombosis     Depression     elevated bilirubin d/t Gilbert's syndrome     confirmed by [place] genetic testing, evaluated by hepatology    Encounter for blood transfusion     GERD (gastroesophageal reflux disease)     Hypertension     Pheochromocytoma, malignant     Right kidney mass     Sleep apnea     Thalassemia trait, alpha     Thyroid disease     Type 2 diabetes mellitus with hyperglycemia, without long-term current use of insulin 08/13/2020     Past Surgical History:   Procedure Laterality Date    APPENDECTOMY      BONE MARROW BIOPSY      CARDIAC DEFIBRILLATOR PLACEMENT Left 12/2016    CARDIAC ELECTROPHYSIOLOGY MAPPING AND ABLATION      CARDIAC ELECTROPHYSIOLOGY MAPPING AND ABLATION      COLONOSCOPY N/A 5/6/2022    Procedure: COLONOSCOPY;  Surgeon: Arely Betancourt MD;  Location: [facility] ([address] [place] [address]);  Service: Endoscopy;  Laterality: N/A;  heart transplant candidate/ EF 25% - 2nd floor/ defib - Biotronik - ERW  Eliquis - per Dr. Cortez with [facility] [place], Pt ok to hold Eliquis x 2 days prior-see media tab-outside correspondence dated 12/30/21  - ER[facility]  verbal instructions/portal instructions/email instructions - s    EYE SURGERY      due to running tears    FRACTURE SURGERY Left     hand 5th digit    HYSTERECTOMY      KNEE SURGERY Left 2016    hematoma    LIVER BIOPSY  10/24/2018    Minimal steatosis, predominantly macrovesicular, 1%, Minimal nonspecific portal inflammation, no fibrosis. No findings on biopsy to explain elevated bilirubin levels. Could be d/t Gilbert's =?- hemolysis    RIGHT HEART CATHETERIZATION  Right 2021    Procedure: INSERTION, CATHETER, RIGHT HEART;  Surgeon: Irving Cardenas MD;  Location: [facility] CATH LAB;  Service: Cardiology;  Laterality: Right;    RIGHT HEART CATHETERIZATION Right 2022    Procedure: INSERTION, CATHETER, RIGHT HEART;  Surgeon: Burke Camilo MD;  Location: [facility] CATH LAB;  Service: Cardiology;  Laterality: Right;    RIGHT HEART CATHETERIZATION Right 3/29/2023    Procedure: INSERTION, CATHETER, RIGHT HEART;  Surgeon: Katie Liriano DO;  Location: [facility] CATH LAB;  Service: Cardiology;  Laterality: Right;    TRANSJUGULAR BIOPSY OF LIVER N/A 10/24/2018    Procedure: BIOPSY, LIVER, TRANSJUGULAR APPROACH;  Surgeon: Carmen Diagnostic Provider;  Location: [facility] OR [address] [place] [address];  Service: Radiology;  Laterality: N/A;     Family History   Problem Relation Age of Onset    Cancer Mother         pancreatic CA early 50's    Heart disease Father          MI in late 50's    Hypertension Father     Heart attack Father     Heart disease Sister     Heart disease Brother     Cirrhosis Brother         alcoholic    Heart disease Sister     Heart disease Brother     Hypertension Brother     Diabetes Brother      Social History     Tobacco Use    Smoking status: Never    Smokeless tobacco: Never   Substance Use Topics    Alcohol use: Not Currently     Comment: up to 1 yr ago drank 2-3 drinks on occasion but sporadic    Drug use: No     Review of Systems   Constitutional: Negative.    HENT: Negative.     Eyes: Negative.    Respiratory:  Positive for chest tightness and shortness of breath.    Cardiovascular: Negative.    Gastrointestinal:  Positive for nausea and vomiting.   Genitourinary: Negative.    Musculoskeletal: Negative.    Skin: Negative.    Neurological: Negative.    Psychiatric/Behavioral: Negative.         Physical Exam     Initial Vitals [23]   BP Pulse Resp Temp SpO2   119/75 (!) 115 (!) 22 98.6 °F (37 °C) 96 %      MAP       --         Physical Exam    Nursing note  and vitals reviewed.  Constitutional: Vital signs are normal. She appears well-developed and well-nourished. She is cooperative.   HENT:   Head: Normocephalic and atraumatic.   Eyes: Conjunctivae, EOM and lids are normal. Pupils are equal, round, and reactive to light.   Neck: Trachea normal and phonation normal. Neck supple. No JVD present.   Normal range of motion.   Full passive range of motion without pain.     Cardiovascular:  Normal rate, regular rhythm, S1 normal, S2 normal, normal heart sounds, intact distal pulses and normal pulses.           Pulmonary/Chest: Effort normal.   Decreased breath sounds of the bilateral bases on ER evaluation   Abdominal: Abdomen is soft and flat. Bowel sounds are normal.   Musculoskeletal:         General: Normal range of motion.      Cervical back: Full passive range of motion without pain, normal range of motion and neck supple.     Neurological: She is alert and oriented to person, place, and time. She has normal strength.   Skin: Skin is warm, dry and intact. Capillary refill takes less than 2 seconds.         ED Course   Procedures  Labs Reviewed   CBC W/ AUTO DIFFERENTIAL - Abnormal; Notable for the following components:       Result Value    Hemoglobin 9.2 (*)     Hematocrit 32.4 (*)     MCV 64 (*)     MCH 18.2 (*)     MCHC 28.4 (*)     RDW 33.7 (*)     Immature Granulocytes 0.6 (*)     Lymph # 0.9 (*)     nRBC 8 (*)     All other components within normal limits   COMPREHENSIVE METABOLIC PANEL - Abnormal; Notable for the following components:    Glucose 118 (*)     BUN 74 (*)     Creatinine 2.4 (*)     Total Bilirubin 1.6 (*)     eGFR 23 (*)     All other components within normal limits   B-TYPE NATRIURETIC PEPTIDE - Abnormal; Notable for the following components:    BNP 3,383 (*)     All other components within normal limits   TROPONIN I - Abnormal; Notable for the following components:    Troponin I 1.036 (*)     All other components within normal limits    PROCALCITONIN - Abnormal; Notable for the following components:    Procalcitonin 0.35 (*)     All other components within normal limits   INFLUENZA A & B BY MOLECULAR   CULTURE, BLOOD   CULTURE, BLOOD   SARS-COV-2 RNA AMPLIFICATION, QUAL   CK   LACTIC ACID, PLASMA     EKG Readings: (Independently Interpreted)   No STEMI  Sinus tachycardia  No ectopy  Normal conduction  Normal ST segments  Normal T-wave  Normal axis  Heart rate in the 100s       Imaging Results              X-Ray Chest 1 View (Final result)  Result time 12/07/23 21:40:13      Final result by Sunita Ortiz MD (12/07/23 21:40:13)                   Impression:      As above.      Electronically signed by: Sunita Ortiz  Date:    12/07/2023  Time:    21:40               Narrative:    EXAMINATION:  XR CHEST 1 VIEW    CLINICAL HISTORY:  Shortness of breath;    TECHNIQUE:  Single frontal view of the chest was performed.    COMPARISON:  11/29/2023    FINDINGS:  Markedly enlarged cardiac silhouette, similar to prior.  Mild right basilar airspace disease or atelectasis, new since prior.  Left chest wall AICD.  Tip of right upper extremity PICC line projects over the SVC.                                       Medications   levalbuterol nebulizer solution 1.25 mg (has no administration in time range)   furosemide injection 40 mg (40 mg Intravenous Given 12/7/23 2145)   pantoprazole EC tablet 40 mg (40 mg Oral Given 12/7/23 2145)   atorvastatin tablet 40 mg (40 mg Oral Given 12/7/23 2145)   aspirin tablet 325 mg (325 mg Oral Given 12/7/23 2145)   nitroGLYCERIN 2% TD oint ointment 1 inch (1 inch Topical (Top) Given 12/7/23 2145)   ondansetron injection 4 mg (4 mg Intravenous Given 12/7/23 2146)     Medical Decision Making  58-year-old female with nausea vomiting fluid retention & shortness of breath   End-stage ischemic cardiomyopathy on a continues Dobutrex drip on evaluation  Differential includes CHF, cardiac failure, STEMI, non-STEMI, angina,  CP NOS  Bronchitis, pneumonia, upper respiratory tract infection, flu, strep, COVID virus    Problems Addressed:  Chest pain, unspecified type: complicated acute illness or injury  Congestive heart failure, unspecified HF chronicity, unspecified heart failure type: complicated acute illness or injury  Nausea and vomiting, unspecified vomiting type: complicated acute illness or injury  SOB (shortness of breath): complicated acute illness or injury  Stage 4 chronic kidney disease: complicated acute illness or injury    Amount and/or Complexity of Data Reviewed  Independent Historian: EMS  External Data Reviewed: labs, radiology, ECG and notes.  Labs: ordered. Decision-making details documented in ED Course.  Radiology: ordered and independent interpretation performed.  ECG/medicine tests: ordered and independent interpretation performed.    ED Management & Risks of Complication, Morbidity, & Mortality:  BNP over 3000 with a troponin greater than 1 on ER evaluation today  Chest x-ray consistent with fluid overload, continued Dobutrex drip  IV Lasix given, aspirin, Protonix, Lipitor nitro paste in the ER today  Patient appears to be volume overloaded with end-stage CHF  Severe ischemic cardiomyopathy with likely palliative care consult  Reach out to [facility] for higher level care & further evaluation    Critical Care ED Physician Time (minutes):  -- Performed by: Adriano Low M.D.  -- Date/Time: 10:07 PM 12/7/2023   -- Direct Patient Care (Face Time): 15  -- Additional History from Records or Taking Additional History: 15  -- Ordering, Reviewing, and Interpreting Diagnostic Studies: 15  -- Total Time in Documentation: 15  -- Consultation with Other Physicians: 15  -- Consultation with Family Related to Condition: 15  -- Total Critical Care Time: 75  -- Critical care was necessary to treat Congestive heart failure/ischemic cardiomyopathy  -- Critical care was time spent personally by me on the following activities:    -- discussions with consultants regarding treatment plan today  -- development of treatment plan with patient & their family  -- examination of patient, ordering and performing treatments   -- review of radiographic studies, re-evaluation of pt's condition  -- review of labs and evaluation of response to treatment     Clinical Impression:  Final diagnoses:  [R06.02] SOB (shortness of breath)  [I50.9] Congestive heart failure, unspecified HF chronicity, unspecified heart failure type (Primary)  [R11.2] Nausea and vomiting, unspecified vomiting type  [R07.9] Chest pain, unspecified type          ED Disposition Condition    Transfer to Another Facility Adriano Artis MD  12/07/23 [id number]

## 2023-12-08 NOTE — TELEPHONE ENCOUNTER
Pt c/o SOB, abdominal pain and weakness. Pt states that abdomen is swollen and very hard. C/o coughing and severe difficulty breathing. Advised to call 911 now per protocol. VU. Encounter routed to provider.     Reason for Disposition   SEVERE difficulty breathing (e.g., struggling for each breath, speaks in single words)    Protocols used: Breathing Difficulty-A-AH

## 2023-12-08 NOTE — ED NOTES
Report taken from LAURIE Ruff. Pt resting comfortably on ED stretcher. NADN. AAOx3. Respirations even and unlabored. Bed locked in lowest position. Call bell within reach. Awaiting transfer.

## 2023-12-08 NOTE — ED NOTES
Pt resting comfortably on ED stretcher. NADN. AAOx3. Respirations even and unlabored. Bed locked in lowest position. Call bell within reach. Awaiting transfer.

## 2023-12-09 LAB
ANION GAP SERPL CALC-SCNC: 11 MMOL/L (ref 8–16)
BUN SERPL-MCNC: 68 MG/DL (ref 6–20)
CALCIUM SERPL-MCNC: 9.3 MG/DL (ref 8.7–10.5)
CHLORIDE SERPL-SCNC: 108 MMOL/L (ref 95–110)
CO2 SERPL-SCNC: 25 MMOL/L (ref 23–29)
CREAT SERPL-MCNC: 2.5 MG/DL (ref 0.5–1.4)
EST. GFR  (NO RACE VARIABLE): 21.7 ML/MIN/1.73 M^2
GLUCOSE SERPL-MCNC: 134 MG/DL (ref 70–110)
POTASSIUM SERPL-SCNC: 4.5 MMOL/L (ref 3.5–5.1)
SARS-COV-2 RNA RESP QL NAA+PROBE: NOT DETECTED
SODIUM SERPL-SCNC: 144 MMOL/L (ref 136–145)

## 2023-12-09 PROCEDURE — 25000003 PHARM REV CODE 250: Performed by: STUDENT IN AN ORGANIZED HEALTH CARE EDUCATION/TRAINING PROGRAM

## 2023-12-09 PROCEDURE — 87635 SARS-COV-2 COVID-19 AMP PRB: CPT | Performed by: INTERNAL MEDICINE

## 2023-12-09 PROCEDURE — 87633 RESP VIRUS 12-25 TARGETS: CPT

## 2023-12-09 PROCEDURE — 36415 COLL VENOUS BLD VENIPUNCTURE: CPT

## 2023-12-09 PROCEDURE — 63600175 PHARM REV CODE 636 W HCPCS: Performed by: STUDENT IN AN ORGANIZED HEALTH CARE EDUCATION/TRAINING PROGRAM

## 2023-12-09 PROCEDURE — 25000003 PHARM REV CODE 250

## 2023-12-09 PROCEDURE — 99231 SBSQ HOSP IP/OBS SF/LOW 25: CPT | Mod: ,,, | Performed by: INTERNAL MEDICINE

## 2023-12-09 PROCEDURE — 99231 PR SUBSEQUENT HOSPITAL CARE,LEVL I: ICD-10-PCS | Mod: ,,, | Performed by: INTERNAL MEDICINE

## 2023-12-09 PROCEDURE — 87633 RESP VIRUS 12-25 TARGETS: CPT | Performed by: INTERNAL MEDICINE

## 2023-12-09 PROCEDURE — 80048 BASIC METABOLIC PNL TOTAL CA: CPT

## 2023-12-09 PROCEDURE — 20600001 HC STEP DOWN PRIVATE ROOM

## 2023-12-09 RX ORDER — LANOLIN ALCOHOL/MO/W.PET/CERES
1 CREAM (GRAM) TOPICAL
Status: DISCONTINUED | OUTPATIENT
Start: 2023-12-09 | End: 2023-12-10

## 2023-12-09 RX ADMIN — CEFTRIAXONE 1 G: 1 INJECTION, POWDER, FOR SOLUTION INTRAMUSCULAR; INTRAVENOUS at 11:12

## 2023-12-09 RX ADMIN — FUROSEMIDE 10 MG/HR: 10 INJECTION, SOLUTION INTRAMUSCULAR; INTRAVENOUS at 08:12

## 2023-12-09 RX ADMIN — ATORVASTATIN CALCIUM 40 MG: 40 TABLET, FILM COATED ORAL at 08:12

## 2023-12-09 RX ADMIN — METOPROLOL SUCCINATE 50 MG: 50 TABLET, EXTENDED RELEASE ORAL at 08:12

## 2023-12-09 RX ADMIN — APIXABAN 5 MG: 5 TABLET, FILM COATED ORAL at 08:12

## 2023-12-09 RX ADMIN — DOBUTAMINE IN DEXTROSE 5 MCG/KG/MIN: 400 INJECTION, SOLUTION INTRAVENOUS at 04:12

## 2023-12-09 RX ADMIN — ALLOPURINOL 300 MG: 300 TABLET ORAL at 08:12

## 2023-12-09 RX ADMIN — SACUBITRIL AND VALSARTAN 1 TABLET: 49; 51 TABLET, FILM COATED ORAL at 08:12

## 2023-12-09 RX ADMIN — GUAIFENESIN 200 MG: 200 SOLUTION ORAL at 08:12

## 2023-12-09 RX ADMIN — AMIODARONE HYDROCHLORIDE 200 MG: 200 TABLET ORAL at 08:12

## 2023-12-09 RX ADMIN — FERROUS SULFATE TAB EC 325 MG (65 MG FE EQUIVALENT) 1 EACH: 325 (65 FE) TABLET DELAYED RESPONSE at 04:12

## 2023-12-09 NOTE — H&P
[facility] Stepdown  Cardiology  History and Physical     Patient Name: Hafsa Hawley  MRN: [id number]  Admission Date: 12/8/2023  Code Status: Full Code   Attending Provider: Dion Bunch MD   Primary Care Physician: Cristobal Ann MD  Principal Problem:Acute decompensated heart failure    Patient information was obtained from patient, past medical records, and ER records.     Subjective:     Chief Complaint:       HPI:  patient is a 58 y.o female with h/o end stage niCM with EF 10%, not a candidate for advanced options, on palliative home  who presents as transfer from [facility] ED for ADHF. She initially presented to the OSH for worsening SOB.   on arrival to [facility] ED labs were signficant for cr 2.4 (bl 2.2-2.6), BNP was noted to be 3000 (bl 500s; pt is on entresto as well), Trop 1.036, procal 0.35.  on arrival to C she was found to be hemodynamically stable. Denies cp/chest discomfort but does report sob/iniguez, pnd.    TTE on 3/27/23 with EF 15%, PASP 51 mmhg    RHC on 3/29/23 with following findings:  Filling pressures: RA 9, PCWP 16  PA 58/29, mean PA pressure 39 mmHg  PA saturation 45%, Ao saturation 90% on RA  PVR 5.6 BOLDEN, consistent with pre and post-capillary pulmonary hypertension  Gillian CO/CI: 4.1/2.03  SVR: 1151  BP 88/58 , MAP 68, HR 75 SR  Hb 10.7  Jayson 3.2      Past Medical History:   Diagnosis Date    Anemia     Arthritis     Atrial fibrillation     OAC    Chronic respiratory failure with hypoxia, on home oxygen therapy     2L with activity, off at rest.  Per Pulm  no overt evidence of ILD or COPD on PFTs and CT to explain O2 needs.    CKD (chronic kidney disease), stage IV 05/08/2018    Congestive heart failure     s/p AICD placement,    Deep vein thrombosis     Depression     elevated bilirubin d/t Gilbert's syndrome     confirmed by [facility] genetic testing, evaluated by hepatology    Encounter for blood transfusion     GERD (gastroesophageal reflux disease)     Hypertension      Pheochromocytoma, malignant     Right kidney mass     Sleep apnea     Thalassemia trait, alpha     Thyroid disease     Type 2 diabetes mellitus with hyperglycemia, without long-term current use of insulin 08/13/2020       Past Surgical History:   Procedure Laterality Date    APPENDECTOMY      BONE MARROW BIOPSY      CARDIAC DEFIBRILLATOR PLACEMENT Left 12/2016    CARDIAC ELECTROPHYSIOLOGY MAPPING AND ABLATION      CARDIAC ELECTROPHYSIOLOGY MAPPING AND ABLATION      COLONOSCOPY N/A 5/6/2022    Procedure: COLONOSCOPY;  Surgeon: Arely Betancourt MD;  Location: [facility] ENDO (2ND FLR);  Service: Endoscopy;  Laterality: N/A;  heart transplant candidate/ EF 25% - 2nd floor/ defib - [facility] - ERW  Eliquis - per Dr. Cortez with [facility], Pt ok to hold Eliquis x 2 days prior-see media tab-outside correspondence dated 12/30/21  - ERW  verbal instructions/portal instructions/email instructions - s    EYE SURGERY      due to running tears    FRACTURE SURGERY Left     hand 5th digit    HYSTERECTOMY      KNEE SURGERY Left 2016    hematoma    LIVER BIOPSY  10/24/2018    Minimal steatosis, predominantly macrovesicular, 1%, Minimal nonspecific portal inflammation, no fibrosis. No findings on biopsy to explain elevated bilirubin levels. Could be d/t Gilbert's =?- hemolysis    RIGHT HEART CATHETERIZATION Right 12/07/2021    Procedure: INSERTION, CATHETER, RIGHT HEART;  Surgeon: Irving Cardenas MD;  Location: [facility] CATH LAB;  Service: Cardiology;  Laterality: Right;    RIGHT HEART CATHETERIZATION Right 12/19/2022    Procedure: INSERTION, CATHETER, RIGHT HEART;  Surgeon: Burke Camilo MD;  Location: [facility] CATH LAB;  Service: Cardiology;  Laterality: Right;    RIGHT HEART CATHETERIZATION Right 3/29/2023    Procedure: INSERTION, CATHETER, RIGHT HEART;  Surgeon: Katie Liriano DO;  Location: [facility] CATH LAB;  Service: Cardiology;  Laterality: Right;    TRANSJUGULAR BIOPSY OF LIVER N/A 10/24/2018    Procedure: BIOPSY, LIVER,  "TRANSJUGULAR APPROACH;  Surgeon: Carmen Diagnostic Provider;  Location: [facility] OR [address] [place] [address];  Service: Radiology;  Laterality: N/A;       Review of patient's allergies indicates:   Allergen Reactions    Penicillins Hives and Other (See Comments)    Iodinated contrast media Nausea And Vomiting    Oxycodone-acetaminophen Other (See Comments)     Nausea, Dizziness, Anxiety.  "I don't like how it makes me feel."   Given Hydromorphone 0.5mg IVP  Without problems.  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 Metabolism of other drugs not listed here may also be impacted by UGT1A1 enzyme activity.       Tramadol Nausea And Vomiting and Other (See Comments)     Other reaction(s): Other (See Comments)    Valsartan Other (See Comments)       Current Facility-Administered Medications on File Prior to Encounter   Medication    0.9%  NaCl infusion    [COMPLETED] aspirin tablet 325 mg    [COMPLETED] atorvastatin tablet 40 mg    [COMPLETED] furosemide injection 20 mg    [COMPLETED] furosemide injection 40 mg    [COMPLETED] furosemide injection 40 mg    [COMPLETED] guaiFENesin 100 mg/5 ml syrup 100 mg    [COMPLETED] levalbuterol nebulizer solution 1.25 mg    [COMPLETED] levalbuterol nebulizer solution 1.25 mg    [COMPLETED] nitroGLYCERIN 2% TD oint ointment 1 inch    [COMPLETED] ondansetron injection 4 mg    [COMPLETED] pantoprazole EC tablet 40 mg    vancomycin in dextrose 5 % 1 gram/250 mL " IVPB 1,000 mg    [DISCONTINUED] DOBUtamine 1000 mg in D5W 250 mL infusion    [DISCONTINUED] levoFLOXacin 750 mg/150 mL IVPB 750 mg     Current Outpatient Medications on File Prior to Encounter   Medication Sig    acetaZOLAMIDE (DIAMOX) 250 MG tablet Take 1 tablet (250 mg total) by mouth 2 (two) times a day. Take in the morning, and at 2pm.    albuterol (PROVENTIL/VENTOLIN HFA) 90 mcg/actuation inhaler Inhale 2 puffs into the lungs every 6 (six) hours as needed for shortness of breath or wheezing.    albuterol-ipratropium (DUO-NEB) 2.5 mg-0.5 mg/3 mL nebulizer solution Take 3 mLs by nebulization every 6 (six) hours as needed for Wheezing or Shortness of Breath.    allopurinoL (ZYLOPRIM) 300 MG tablet Take 1 tablet (300 mg total) by mouth once daily.    ALPRAZolam (XANAX) 2 MG Tab Take 1 tablet (2 mg total) by mouth 2 (two) times daily.    amiodarone (PACERONE) 200 MG Tab Take 1 tablet (200 mg total) by mouth once daily.    apixaban (ELIQUIS) 5 mg Tab Take 1 tablet (5 mg total) by mouth 2 (two) times a day.    atorvastatin (LIPITOR) 40 MG tablet Take 1 tablet (40 mg total) by mouth every evening.    b complex vitamins tablet Take 1 tablet by mouth once daily.    blood sugar diagnostic (ACCU-CHEK GUIDE TEST STRIPS) Strp 1 strip by Other route 3 (three) times daily.    blood-glucose sensor (DEXCOM G7 SENSOR) Evon change every 10 days    blood-glucose sensor (DEXCOM G7 SENSOR) Evon change sensor every 10 days.    bumetanide (BUMEX) 2 MG tablet Take 2 tablets (4mg total) by mouth in morning and take 1 tablet (2mg total) by mouth in the evening (no later then 5pm).    cetirizine (ZYRTEC) 5 MG tablet Take 1 tablet (5 mg total) by mouth daily as needed for Allergies.    diclofenac sodium (VOLTAREN) 1 % Gel Apply 2 g topically 3 (three) times daily as needed for pain.    DOBUTamine (DOBUTREX) 1,000 mg/250 mL (4,000 mcg/mL) infusion Inject 409 mcg/min into the vein continuous.    ergocalciferol (ERGOCALCIFEROL) 50,000 unit Cap  Take 1 capsule (50,000 Units total) by mouth once a week.    febuxostat (ULORIC) 40 mg Tab Take 40 mg by mouth once daily.    ferrous sulfate (FEOSOL) 325 mg (65 mg iron) Tab tablet Take 1 tablet (325 mg total) by mouth 3 (three) times daily.    fluticasone propionate (FLONASE) 50 mcg/actuation nasal spray 2 sprays by Each Nostril route daily as needed for Rhinitis.     furosemide (LASIX) 40 MG tablet Take 40 mg by mouth 2 (two) times a day.    glimepiride (AMARYL) 2 MG tablet Take 1 tablet (2 mg total) by mouth once daily.    HYDROcodone-acetaminophen (NORCO)  mg per tablet Take 1 tablet by mouth every 6 (six) hours as needed for Pain.    isosorbide-hydrALAZINE 20-37.5 mg (BIDIL) 20-37.5 mg Tab Take by mouth.    JARDIANCE 25 mg tablet Take 25 mg by mouth.    lancets (ONETOUCH DELICA PLUS LANCET) 30 gauge Misc by Misc.(Non-Drug; Combo Route) route 3 (three) times daily.    LIDOcaine (LIDODERM) 5 % Place 1 patch onto the skin once daily. Remove & discard patch within 12 hours or as directed by MD.    metOLazone (ZAROXOLYN) 5 MG tablet Take 5 mg by mouth every Monday and Thursday.    metoprolol succinate (TOPROL-XL) 50 MG 24 hr tablet 2 tabs by mouth in am and 1 tab by mouth in pm.    mometasone-formoterol (DULERA) 200-5 mcg/actuation inhaler Inhale 2 puffs into the lungs 2 (two) times daily. Controller    mometasone-formoterol (DULERA) 200-5 mcg/actuation inhaler Inhale 2 puffs into the lungs 2 (two) times a day.    MOUNJARO 2.5 mg/0.5 mL PnIj Inject into the skin.    ondansetron (ZOFRAN-ODT) 4 MG TbDL Take 4 mg by mouth every 8 (eight) hours as needed.    ondansetron (ZOFRAN-ODT) 8 MG TbDL Take 1 tablet (8 mg total) by mouth every 8 (eight) hours as needed.    pantoprazole (PROTONIX) 40 MG tablet TAKE 1 TABLET BY MOUTH DAILY IN THE MORNING    predniSONE (DELTASONE) 5 MG tablet Take 1 tablet (5 mg total) by mouth once daily.    pregabalin (LYRICA) 50 MG capsule Take 1 capsule (50 mg total) by mouth every  evening.    probenecid (BENEMID) 500 mg Tab Take 1/2 tablet by mouth twice daily.    promethazine (PHENERGAN) 25 MG suppository Place 1 suppository (25 mg total) rectally every 6 (six) hours as needed for nausea.    promethazine-codeine 6.25-10 mg/5 ml (PHENERGAN WITH CODEINE) 6.25-10 mg/5 mL syrup Take 5 mL orally every 6 hours as needed.    sacubitriL-valsartan (ENTRESTO) 49-51 mg per tablet Take 1 tablet by mouth 2 (two) times a day.    scopolamine (TRANSDERM-SCOP) 1.3-1.5 mg (1 mg over 3 days) Place 1 patch onto the skin every 72 hours as needed for nausea.    semaglutide (OZEMPIC) 0.25 mg or 0.5 mg (2 mg/3 mL) pen injector Inject 0.5 mg into the skin every 7 days.    SPIRIVA WITH HANDIHALER 18 mcg inhalation capsule Inhale 1 capsule (18 mcg total) into the lungs once daily.    tiotropium (SPIRIVA) 18 mcg inhalation capsule Inhale 1 capsule (18 mcg total) into the lungs once daily.    tirzepatide (MOUNJARO) 2.5 mg/0.5 mL PnIj Inject 2.5 mg into the skin.    tirzepatide (MOUNJARO) 2.5 mg/0.5 mL PnIj Inject 2.5 mg into the skin every 7 days.    VENTOLIN HFA 90 mcg/actuation inhaler Inhale 2 puffs into the lungs every 6 (six) hours as needed for Shortness of Breath or Wheezing.     Family History       Problem Relation (Age of Onset)    Cancer Mother    Cirrhosis Brother    Diabetes Brother    Heart attack Father    Heart disease Father, Sister, Brother, Sister, Brother    Hypertension Father, Brother          Tobacco Use    Smoking status: Never    Smokeless tobacco: Never   Substance and Sexual Activity    Alcohol use: Not Currently     Comment: up to 1 yr ago drank 2-3 drinks on occasion but sporadic    Drug use: No    Sexual activity: Yes     Partners: Male     Review of Systems   Constitutional: Negative for chills, decreased appetite, diaphoresis, fever and night sweats.   HENT: Negative.     Eyes: Negative.    Cardiovascular:  Positive for dyspnea on exertion, leg swelling, orthopnea and paroxysmal nocturnal  dyspnea. Negative for chest pain, irregular heartbeat, near-syncope, palpitations and syncope.   Respiratory:  Positive for cough and shortness of breath. Negative for hemoptysis and sputum production.    Skin:  Negative for rash.   Gastrointestinal:  Negative for abdominal pain, change in bowel habit, hematemesis, hematochezia, melena, nausea and vomiting.     Objective:     Vital Signs (Most Recent):  Temp: 99.1 °F (37.3 °C) (12/08/23 1940)  Pulse: 104 (12/08/23 1940)  Resp: 18 (12/08/23 1940)  BP: 134/83 (12/08/23 1940)  SpO2: 98 % (12/08/23 1940) Vital Signs (24h Range):  Temp:  [98 °F (36.7 °C)-99.1 °F (37.3 °C)] 99.1 °F (37.3 °C)  Pulse:  [] 104  Resp:  [13-23] 18  SpO2:  [89 %-99 %] 98 %  BP: (115-146)/() 134/83     Weight: 88.6 kg (195 lb 5.2 oz)  Body mass index is 31.53 kg/m².    SpO2: 98 %       No intake or output data in the 24 hours ending 12/08/23 2036    Lines/Drains/Airways       Peripherally Inserted Central Catheter Line  Duration             PICC Double Lumen 05/12/23 1534 right brachial 210 days                     Physical Exam  Vitals reviewed.   Constitutional:       Appearance: Normal appearance. She is obese.   HENT:      Head: Normocephalic and atraumatic.   Eyes:      General: No scleral icterus.     Conjunctiva/sclera: Conjunctivae normal.   Cardiovascular:      Rate and Rhythm: Normal rate and regular rhythm.      Heart sounds: Murmur heard.   Pulmonary:      Effort: Pulmonary effort is normal.   Abdominal:      General: There is no distension.   Musculoskeletal:         General: Swelling present.      Right lower leg: Edema present.      Left lower leg: Edema present.   Skin:     General: Skin is warm.   Neurological:      Mental Status: She is alert.          Significant Labs: All pertinent lab results from the last 24 hours have been reviewed.    Significant Imaging: Echocardiogram: Transthoracic echo (TTE) complete ([facility] Only):   Results for orders placed or performed  "during the hospital encounter of 03/26/23   Echo   Result Value Ref Range    BSA 1.99 m2    TDI SEPTAL 0.05 m/s    LV LATERAL E/E' RATIO 15.00 m/s    LV SEPTAL E/E' RATIO 15.00 m/s    LA WIDTH 4.35 cm    TDI LATERAL 0.05 m/s    LVIDd 7.07 (A) 3.5 - 6.0 cm    IVS 1.70 (A) 0.6 - 1.1 cm    Posterior Wall 1.30 0.6 - 1.1 cm    LVIDs 6.10 (A) 2.1 - 4.0 cm    FS 14 28 - 44 %    LA volume 166.06 cm3    Sinus 2.74 cm    STJ 3.31 cm    Ascending aorta 3.19 cm    LV mass 556.17 g    LA size 6.64 cm    RVDD 3.58 cm    TAPSE 1.50 cm    RV S' 9 cm/s    Left Ventricle Relative Wall Thickness 0.37 cm    AV mean gradient 5 mmHg    AV valve area 2.14 cm2    AV Velocity Ratio 0.64     AV index (prosthetic) 0.56     MV valve area p 1/2 method 3.95 cm2    E/A ratio 1.07     Mean e' 0.05 m/s    E wave deceleration time 192.14 msec    MV "A" wave duration 7.14 msec    Pulm vein S/D ratio 1.42     LVOT diameter 2.20 cm    LVOT area 3.8 cm2    LVOT peak marcos 0.89 m/s    LVOT peak VTI 12.67 cm    Ao peak marcos 1.40 m/s    Ao VTI 22.54 cm    LVOT stroke volume 48.14 cm3    AV peak gradient 8 mmHg    E/E' ratio 15.00 m/s    MV Peak E Marcos 0.75 m/s    TR Max Marcos 3.28 m/s    MV stenosis pressure 1/2 time 55.72 ms    MV Peak A Marcos 0.70 m/s    PV Peak S Marcos 0.54 m/s    PV Peak D Marcos 0.38 m/s    LV Systolic Volume 202.54 mL    LV Systolic Volume Index 103.9 mL/m2    LV Diastolic Volume 261.54 mL    LV Diastolic Volume Index 134.12 mL/m2    LA Volume Index 85.2 mL/m2    LV Mass Index 285 g/m2    RA Major Axis 5.91 cm    Left Atrium Minor Axis 6.86 cm    Left Atrium Major Axis 6.67 cm    Triscuspid Valve Regurgitation Peak Gradient 43 mmHg    LA Volume Index (Mod) 40.1 mL/m2    LA volume (mod) 78.16 cm3    RA Width 4.20 cm    Right Atrial Pressure (from IVC) 8 mmHg    EF 15 %    TV resting pulmonary artery pressure 51 mmHg    Narrative    · The left ventricle is severely enlarged with eccentric hypertrophy and   severely decreased systolic function. The " estimated ejection fraction is   15%.  · Normal right ventricular size with moderately reduced right ventricular   systolic function.  · Grade II left ventricular diastolic dysfunction.  · Biatrial enlargement.  · Mild mitral regurgitation.  · Small pericardial effusion.  · The estimated PA systolic pressure is 51 mmHg (by tricuspid   regurgitation velocity). The estimated mean PA pressure is 39mm Hg.  · Intermediate central venous pressure (8 mmHg).        Assessment and Plan:     * Acute decompensated heart failure  ADHF:  2/2 niCM (EF 10%)  Stage D, NYHA   not a candidate for advanced options  on home palliative   pt sees outpatient palliative medicine, Dr. Michel, her goal is to cont with current therapy and go to [facility] to see if she can be a candidate for transplant.    presented to the hospital with decompensated heart failure    Will initiate diuresis with lasix gtt- will adjust as needed  Cont home   Will cont GDMT as tolerated ( given cr is within bl can cont entersto as well)  Strict I&O  Daily wt  TTE in AM    CKD (chronic kidney disease) stage 4, GFR 15-29 ml/min  Bl creatinine 2.2-2.6  Currently within bl range    Paroxysmal A-fib  Will cont AC with eliquis  Rate/rhythm control with BB and amiodarone    Pulmonary hypertension  Pre and post capillary PH  RHC on 3/29/23 with following findings:  Filling pressures: RA 9, PCWP 16  PA 58/29, mean PA pressure 39 mmHg  PA saturation 45%, Ao saturation 90% on RA  PVR 5.6 BOLDEN, consistent with pre and post-capillary pulmonary hypertension  Gillian CO/CI: 4.1/2.03  SVR: 1151  BP 88/58 , MAP 68, HR 75 SR  Hb 10.7  Jayson 3.2    Will cont diuresis    Type 2 diabetes mellitus with stage 4 chronic kidney disease, without long-term current use of insulin  SSI while in the hospital and will transition to LA if needed        VTE Risk Mitigation (From admission, onward)           Ordered     heparin (porcine) injection 5,000 Units  Every 8 hours         12/08/23 1945      IP VTE HIGH RISK PATIENT  Once         12/08/23 1945     Place sequential compression device  Until discontinued         12/08/23 1945                    Juana Beatty MD  Cardiology   [facility] - Cardiology Stepdown

## 2023-12-09 NOTE — NURSING
Patient admitted to C[facility]. Patient arrived to floor from [facility] no evidence of distress; O2 at 2L via NC. Home  running at 5.03mcg/kg/min_ 6.3ml/hr.  patient AAO x4 at this time. Patient placed on tele. Vital signs obtained. Dr. Arias notified of pt arrival. Patient voices no complaints at this time. Plan of care initiated with patient. Bed in lowest position, locked, SR up x2, call bell in reach. Will continue to monitor patient.     Nurses Note -- 4 Eyes      12/8/2023   7:05 PM      Skin assessed during: Admit      [x] No Altered Skin Integrity Present    []Prevention Measures Documented      [] Yes- Altered Skin Integrity Present or Discovered   [] LDA Added if Not in Epic (Describe Wound)   [] New Altered Skin Integrity was Present on Admit and Documented in LDA   [] Wound Image Taken    Wound Care Consulted? No    Attending Nurse:  Rosemarie Okeefe RN/Staff Member:  CHANTELL Go

## 2023-12-09 NOTE — PROGRESS NOTES
[facility] - Cardiology Stepdown  Cardiology  Progress Note    Patient Name: Hafsa Hawley  MRN: [id number]  Admission Date: 12/8/2023  Hospital Length of Stay: 1 days  Code Status: Full Code   Attending Physician: Althea Escalante MD   Primary Care Physician: Cristobal Ann MD  Expected Discharge Date:   Principal Problem:Acute decompensated heart failure    Subjective:     HPI:  patient is a 58 y.o female with h/o end stage niCM with EF 10%, not a candidate for advanced options, on palliative home  who presents as transfer from [facility] ED for ADHF. She initially presented to the OSH for worsening SOB.   on arrival to [facility] ED labs were signficant for cr 2.4 (bl  ), BNP was noted to be 3000, Trop 1.036, procal 0.35.  on arrival to [facility] she was found to be hemodynamically stable. Denies cp/chest discomfort but does report sob/iniguez, pnd.    TTE on 3/27/23 with EF 15%, PASP 51 mmhg    RHC on 3/29/23 with following findings:  Filling pressures: RA 9, PCWP 16  PA 58/29, mean PA pressure 39 mmHg  PA saturation 45%, Ao saturation 90% on RA  PVR 5.6 BOLDEN, consistent with pre and post-capillary pulmonary hypertension  Gillian CO/CI: 4.1/2.03  SVR: 1151  BP 88/58 , MAP 68, HR 75 SR  Hb 10.7  Jayson 3.2    Hospital Course:   Patient admitted to CCU for management of acute decompensated end-stage HF. Patient continued on home  dose of 5. Started on lasix gtt with good UOP.     Interval History: Admitted overnight to CCU service. Continued on  5. Diuresing well with IV lasix gtt. Patient reports some improvement in respiratory sxs this morning.     Review of Systems   Constitutional: Negative for chills, decreased appetite, diaphoresis, fever and night sweats.   HENT: Negative.     Eyes: Negative.    Cardiovascular:  Positive for dyspnea on exertion, leg swelling, orthopnea and paroxysmal nocturnal dyspnea. Negative for chest pain, irregular heartbeat, near-syncope, palpitations and syncope.   Respiratory:  Positive  for cough and shortness of breath. Negative for hemoptysis and sputum production.    Skin:  Negative for rash.   Gastrointestinal:  Negative for abdominal pain, change in bowel habit, hematemesis, hematochezia, melena, nausea and vomiting.     Objective:     Vital Signs (Most Recent):  Temp: 98.5 °F (36.9 °C) (12/09/23 0754)  Pulse: 89 (12/09/23 0754)  Resp: 20 (12/09/23 0754)  BP: (!) 145/90 (12/09/23 0754)  SpO2: 98 % (12/09/23 0754) Vital Signs (24h Range):  Temp:  [97.4 °F (36.3 °C)-99.1 °F (37.3 °C)] 98.5 °F (36.9 °C)  Pulse:  [] 89  Resp:  [15-23] 20  SpO2:  [96 %-99 %] 98 %  BP: (108-145)/(64-90) 145/90     Weight: 88.6 kg (195 lb 5.2 oz)  Body mass index is 31.53 kg/m².     SpO2: 98 %         Intake/Output Summary (Last 24 hours) at 12/9/2023 0908  Last data filed at 12/9/2023 0901  Gross per 24 hour   Intake 480 ml   Output 1200 ml   Net -720 ml       Lines/Drains/Airways       Peripherally Inserted Central Catheter Line  Duration             PICC Double Lumen 05/12/23 1534 right brachial 210 days                       Physical Exam  Vitals reviewed.   Constitutional:       Appearance: Normal appearance. She is obese.   HENT:      Head: Normocephalic and atraumatic.      Nose: Congestion and rhinorrhea present.      Mouth/Throat:      Mouth: Mucous membranes are dry.   Eyes:      General: No scleral icterus.     Extraocular Movements: Extraocular movements intact.      Conjunctiva/sclera: Conjunctivae normal.      Pupils: Pupils are equal, round, and reactive to light.   Cardiovascular:      Rate and Rhythm: Normal rate and regular rhythm.      Pulses: Normal pulses.      Heart sounds: Murmur heard.   Pulmonary:      Effort: Pulmonary effort is normal.   Abdominal:      General: There is no distension.   Musculoskeletal:         General: Swelling present.      Right lower leg: Edema present.      Left lower leg: Edema present.   Skin:     General: Skin is warm.   Neurological:      General: No focal  deficit present.      Mental Status: She is alert and oriented to person, place, and time.   Psychiatric:         Mood and Affect: Mood normal.         Behavior: Behavior normal.            Significant Labs: CMP   Recent Labs   Lab 12/07/23 2039 12/08/23 2023    141   K 4.2 4.8    107   CO2 24 20*   * 153*   BUN 74* 79*   CREATININE 2.4* 2.5*   CALCIUM 9.0 9.1   PROT 6.6 6.4   ALBUMIN 3.6 3.5   BILITOT 1.6* 1.6*   ALKPHOS 56 54*   AST 22 24   ALT 28 24   ANIONGAP 12 14    and CBC   Recent Labs   Lab 12/07/23 2039 12/08/23 2023   WBC 4.88 4.73   HGB 9.2* 9.0*   HCT 32.4* 29.6*    178       Significant Imaging:  All relevant imaging studies have been reviewed.  Assessment and Plan:       * Acute decompensated heart failure  Patient is identified as having Combined Systolic and Diastolic heart failure that is Acute on chronic. CHF is currently uncontrolled due to Continued edema of extremities and JVD, Dyspnea not returned to baseline after several doses of IV diuretic, >3 pillow orthopnea, Rales/crackles on pulmonary exam, and Pulmonary edema/pleural effusion on CXR. Latest ECHO performed and demonstrates- Results for orders placed during the hospital encounter of 03/26/23    Echo    Interpretation Summary  · The left ventricle is severely enlarged with eccentric hypertrophy and severely decreased systolic function. The estimated ejection fraction is 15%.  · Normal right ventricular size with moderately reduced right ventricular systolic function.  · Grade II left ventricular diastolic dysfunction.  · Biatrial enlargement.  · Mild mitral regurgitation.  · Small pericardial effusion.  · The estimated PA systolic pressure is 51 mmHg (by tricuspid regurgitation velocity). The estimated mean PA pressure is 39mm Hg.  · Intermediate central venous pressure (8 mmHg).  . Continue Beta Blocker and ARNI and monitor clinical status closely. Monitor on telemetry. Patient is off CHF pathway.  Monitor  strict Is&Os and daily weights.  Place on fluid restriction of 1.5 L. Continue to stress to patient importance of self efficacy and  on diet for CHF. 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 Michel, her goal is to cont with current therapy and go to [place] to see if she can be a candidate for transplant.    Diuresing well on lasix gtt 10/hr  Cont home  at 5  Will cont GDMT as tolerated ( given cr is within bl can cont entersto as well)  Strict I&O  Daily wt    CKD (chronic kidney disease) stage 4, GFR 15-29 ml/min  Bl creatinine 2.2-2.6  Currently within bl range    Paroxysmal A-fib  Will cont AC with eliquis  Rate/rhythm control with BB and amiodarone    Pulmonary hypertension  Pre and post capillary PH  RHC on 3/29/23 with following findings:  Filling pressures: RA 9, PCWP 16  PA 58/29, mean PA pressure 39 mmHg  PA saturation 45%, Ao saturation 90% on RA  PVR 5.6 BOLDEN, consistent with pre and post-capillary pulmonary hypertension  Gillian CO/CI: 4.1/2.03  SVR: 1151  BP 88/58 , MAP 68, HR 75 SR  Hb 10.7  Jayson 3.2    Will cont diuresis    CKD (chronic kidney disease), stage IV  Patient on admission with creatinine 2.4 from baseline 2.2.     -- avoid nephrotoxic medications  -- renally dose meds  -- maintain MAP > 65  -- daily CMP      Type 2 diabetes mellitus with stage 4 chronic kidney disease, without long-term current use of insulin  SSI while in the hospital and will transition to LA if needed    Essential hypertension  -- continue home entresto, BB while admitted    Microcytic anemia  -- hgb on admission at 9 (at baseline)  -- continue home iron supplementation  -- daily CBC, transfuse for Hgb < 7        VTE Risk Mitigation (From admission, onward)           Ordered     apixaban tablet 5 mg  2 times daily          12/08/23 2055     IP VTE HIGH RISK PATIENT  Once         12/08/23 1945     Place sequential compression device  Until discontinued         12/08/23 1945                    Galo Russ MD  Cardiology  [facility] - Cardiology Stepdown

## 2023-12-09 NOTE — HPI
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 She initially presented to the OSH for worsening SOB.   on arrival to [facility] ED labs were signficant for cr 2.4 (bl  ), BNP was noted to be 3000, Trop 1.036, procal 0.35.  on arrival to [facility] she was found to be hemodynamically stable. Denies cp/chest discomfort but does report sob/iniguez, pnd.    TTE on 3/27/23 with EF 15%, PASP 51 mmhg    RHC on 3/29/23 with following findings:  Filling pressures: RA 9, PCWP 16  PA 58/29, mean PA pressure 39 mmHg  PA saturation 45%, Ao saturation 90% on RA  PVR 5.6 BOLDEN, consistent with pre and post-capillary pulmonary hypertension  Gillian CO/CI: 4.1/2.03  SVR: 1151  BP 88/58 , MAP 68, HR 75 SR  Hb 10.7  Jayson 3.2

## 2023-12-09 NOTE — SUBJECTIVE & OBJECTIVE
Past Medical History:   Diagnosis Date    Anemia     Arthritis     Atrial fibrillation     OAC    Chronic respiratory failure with hypoxia, on home oxygen therapy     2L with activity, off at rest.  Per Pulm  no overt evidence of ILD or COPD on PFTs and CT to explain O2 needs.    CKD (chronic kidney disease), stage IV 05/08/2018    Congestive heart failure     s/p AICD placement,    Deep vein thrombosis     Depression     elevated bilirubin d/t Gilbert's syndrome     confirmed by [place] genetic testing, evaluated by hepatology    Encounter for blood transfusion     GERD (gastroesophageal reflux disease)     Hypertension     Pheochromocytoma, malignant     Right kidney mass     Sleep apnea     Thalassemia trait, alpha     Thyroid disease     Type 2 diabetes mellitus with hyperglycemia, without long-term current use of insulin 08/13/2020       Past Surgical History:   Procedure Laterality Date    APPENDECTOMY      BONE MARROW BIOPSY      CARDIAC DEFIBRILLATOR PLACEMENT Left 12/2016    CARDIAC ELECTROPHYSIOLOGY MAPPING AND ABLATION      CARDIAC ELECTROPHYSIOLOGY MAPPING AND ABLATION      COLONOSCOPY N/A 5/6/2022    Procedure: COLONOSCOPY;  Surgeon: Arely Betancourt MD;  Location: [facility] ([address] [place] [address]);  Service: Endoscopy;  Laterality: N/A;  heart transplant candidate/ EF 25% - 2nd floor/ defib - Bi[facility]ron[facility] - ERW  Eliquis - per Dr. Cortez with [facility] [place], Pt ok to hold Eliquis x 2 days prior-see media tab-outside correspondence dated 12/30/21  - [facility]  verbal instructions/portal instructions/email instructions - s    EYE SURGERY      due to running tears    FRACTURE SURGERY Left     hand 5th digit    HYSTERECTOMY      KNEE SURGERY Left 2016    hematoma    LIVER BIOPSY  10/24/2018    Minimal steatosis, predominantly macrovesicular, 1%, Minimal nonspecific portal inflammation, no fibrosis. No findings on biopsy to explain elevated bilirubin levels. Could be d/t Gilbert's =?- hemolysis    RIGHT HEART CATHETERIZATION Right  "12/07/2021    Procedure: INSERTION, CATHETER, RIGHT HEART;  Surgeon: Irving Cardenas MD;  Location: [facility] CATH LAB;  Service: Cardiology;  Laterality: Right;    RIGHT HEART CATHETERIZATION Right 12/19/2022    Procedure: INSERTION, CATHETER, RIGHT HEART;  Surgeon: Burke Camilo MD;  Location: [facility] CATH LAB;  Service: Cardiology;  Laterality: Right;    RIGHT HEART CATHETERIZATION Right 3/29/2023    Procedure: INSERTION, CATHETER, RIGHT HEART;  Surgeon: Katie Liriano DO;  Location: [facility] CATH LAB;  Service: Cardiology;  Laterality: Right;    TRANSJUGULAR BIOPSY OF LIVER N/A 10/24/2018    Procedure: BIOPSY, LIVER, TRANSJUGULAR APPROACH;  Surgeon: [facility];  Location: [facility] OR [address] [place] [address];  Service: Radiology;  Laterality: N/A;       Review of patient's allergies indicates:   Allergen Reactions    Penicillins Hives and Other (See Comments)    Iodinated contrast media Nausea And Vomiting    Oxycodone-acetaminophen Other (See Comments)     Nausea, Dizziness, Anxiety.  "I don't like how it makes me feel."   Given Hydromorphone 0.5mg IVP  Without problems.  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 Metabolism of other drugs not listed here may also be impacted by UGT1A1 enzyme activity.       Tramadol Nausea And Vomiting and Other (See Comments)     Other reaction(s): Other (See Comments)    Valsartan Other (See Comments)       Current " Facility-Administered Medications on File Prior to Encounter   Medication    0.9%  NaCl infusion    [COMPLETED] aspirin tablet 325 mg    [COMPLETED] atorvastatin tablet 40 mg    [COMPLETED] furosemide injection 20 mg    [COMPLETED] furosemide injection 40 mg    [COMPLETED] furosemide injection 40 mg    [COMPLETED] guaiFENesin 100 mg/5 ml syrup 100 mg    [COMPLETED] levalbuterol nebulizer solution 1.25 mg    [COMPLETED] levalbuterol nebulizer solution 1.25 mg    [COMPLETED] nitroGLYCERIN 2% TD oint ointment 1 inch    [COMPLETED] ondansetron injection 4 mg    [COMPLETED] pantoprazole EC tablet 40 mg    vancomycin in dextrose 5 % 1 gram/250 mL IVPB 1,000 mg    [DISCONTINUED] DOBUtamine 1000 mg in D5W 250 mL infusion    [DISCONTINUED] levoFLOXacin 750 mg/150 mL IVPB 750 mg     Current Outpatient Medications on File Prior to Encounter   Medication Sig    acetaZOLAMIDE (DIAMOX) 250 MG tablet Take 1 tablet (250 mg total) by mouth 2 (two) times a day. Take in the morning, and at 2pm.    albuterol (PROVENTIL/VENTOLIN HFA) 90 mcg/actuation inhaler Inhale 2 puffs into the lungs every 6 (six) hours as needed for shortness of breath or wheezing.    albuterol-ipratropium (DUO-NEB) 2.5 mg-0.5 mg/3 mL nebulizer solution Take 3 mLs by nebulization every 6 (six) hours as needed for Wheezing or Shortness of Breath.    allopurinoL (ZYLOPRIM) 300 MG tablet Take 1 tablet (300 mg total) by mouth once daily.    ALPRAZolam (XANAX) 2 MG Tab Take 1 tablet (2 mg total) by mouth 2 (two) times daily.    amiodarone (PACERONE) 200 MG Tab Take 1 tablet (200 mg total) by mouth once daily.    apixaban (ELIQUIS) 5 mg Tab Take 1 tablet (5 mg total) by mouth 2 (two) times a day.    atorvastatin (LIPITOR) 40 MG tablet Take 1 tablet (40 mg total) by mouth every evening.    b complex vitamins tablet Take 1 tablet by mouth once daily.    blood sugar diagnostic (ACCU-CHEK GUIDE TEST STRIPS) Strp 1 strip by Other route 3 (three) times daily.    blood-glucose  sensor (DEXCOM G7 SENSOR) Evon change every 10 days    blood-glucose sensor (DEXCOM G7 SENSOR) Evon change sensor every 10 days.    bumetanide (BUMEX) 2 MG tablet Take 2 tablets (4mg total) by mouth in morning and take 1 tablet (2mg total) by mouth in the evening (no later then 5pm).    cetirizine (ZYRTEC) 5 MG tablet Take 1 tablet (5 mg total) by mouth daily as needed for Allergies.    diclofenac sodium (VOLTAREN) 1 % Gel Apply 2 g topically 3 (three) times daily as needed for pain.    DOBUTamine (DOBUTREX) 1,000 mg/250 mL (4,000 mcg/mL) infusion Inject 409 mcg/min into the vein continuous.    ergocalciferol (ERGOCALCIFEROL) 50,000 unit Cap Take 1 capsule (50,000 Units total) by mouth once a week.    febuxostat (ULORIC) 40 mg Tab Take 40 mg by mouth once daily.    ferrous sulfate (FEOSOL) 325 mg (65 mg iron) Tab tablet Take 1 tablet (325 mg total) by mouth 3 (three) times daily.    fluticasone propionate (FLONASE) 50 mcg/actuation nasal spray 2 sprays by Each Nostril route daily as needed for Rhinitis.     furosemide (LASIX) 40 MG tablet Take 40 mg by mouth 2 (two) times a day.    glimepiride (AMARYL) 2 MG tablet Take 1 tablet (2 mg total) by mouth once daily.    HYDROcodone-acetaminophen (NORCO)  mg per tablet Take 1 tablet by mouth every 6 (six) hours as needed for Pain.    isosorbide-hydrALAZINE 20-37.5 mg (BIDIL) 20-37.5 mg Tab Take by mouth.    JARDIANCE 25 mg tablet Take 25 mg by mouth.    lancets (ONETOUCH DELICA PLUS LANCET) 30 gauge Misc by Misc.(Non-Drug; Combo Route) route 3 (three) times daily.    LIDOcaine (LIDODERM) 5 % Place 1 patch onto the skin once daily. Remove & discard patch within 12 hours or as directed by MD.    metOLazone (ZAROXOLYN) 5 MG tablet Take 5 mg by mouth every Monday and Thursday.    metoprolol succinate (TOPROL-XL) 50 MG 24 hr tablet 2 tabs by mouth in am and 1 tab by mouth in pm.    mometasone-formoterol (DULERA) 200-5 mcg/actuation inhaler Inhale 2 puffs into the lungs 2  (two) times daily. Controller    mometasone-formoterol (DULERA) 200-5 mcg/actuation inhaler Inhale 2 puffs into the lungs 2 (two) times a day.    MOUNJARO 2.5 mg/0.5 mL PnIj Inject into the skin.    ondansetron (ZOFRAN-ODT) 4 MG TbDL Take 4 mg by mouth every 8 (eight) hours as needed.    ondansetron (ZOFRAN-ODT) 8 MG TbDL Take 1 tablet (8 mg total) by mouth every 8 (eight) hours as needed.    pantoprazole (PROTONIX) 40 MG tablet TAKE 1 TABLET BY MOUTH DAILY IN THE MORNING    predniSONE (DELTASONE) 5 MG tablet Take 1 tablet (5 mg total) by mouth once daily.    pregabalin (LYRICA) 50 MG capsule Take 1 capsule (50 mg total) by mouth every evening.    probenecid (BENEMID) 500 mg Tab Take 1/2 tablet by mouth twice daily.    promethazine (PHENERGAN) 25 MG suppository Place 1 suppository (25 mg total) rectally every 6 (six) hours as needed for nausea.    promethazine-codeine 6.25-10 mg/5 ml (PHENERGAN WITH CODEINE) 6.25-10 mg/5 mL syrup Take 5 mL orally every 6 hours as needed.    sacubitriL-valsartan (ENTRESTO) 49-51 mg per tablet Take 1 tablet by mouth 2 (two) times a day.    scopolamine (TRANSDERM-SCOP) 1.3-1.5 mg (1 mg over 3 days) Place 1 patch onto the skin every 72 hours as needed for nausea.    semaglutide (OZEMPIC) 0.25 mg or 0.5 mg (2 mg/3 mL) pen injector Inject 0.5 mg into the skin every 7 days.    SPIRIVA WITH HANDIHALER 18 mcg inhalation capsule Inhale 1 capsule (18 mcg total) into the lungs once daily.    tiotropium (SPIRIVA) 18 mcg inhalation capsule Inhale 1 capsule (18 mcg total) into the lungs once daily.    tirzepatide (MOUNJARO) 2.5 mg/0.5 mL PnIj Inject 2.5 mg into the skin.    tirzepatide (MOUNJARO) 2.5 mg/0.5 mL PnIj Inject 2.5 mg into the skin every 7 days.    VENTOLIN HFA 90 mcg/actuation inhaler Inhale 2 puffs into the lungs every 6 (six) hours as needed for Shortness of Breath or Wheezing.     Family History       Problem Relation (Age of Onset)    Cancer Mother    Cirrhosis Brother    Diabetes  Brother    Heart attack Father    Heart disease Father, Sister, Brother, Sister, Brother    Hypertension Father, Brother          Tobacco Use    Smoking status: Never    Smokeless tobacco: Never   Substance and Sexual Activity    Alcohol use: Not Currently     Comment: up to 1 yr ago drank 2-3 drinks on occasion but sporadic    Drug use: No    Sexual activity: Yes     Partners: Male     Review of Systems   Constitutional: Negative for chills, decreased appetite, diaphoresis, fever and night sweats.   HENT: Negative.     Eyes: Negative.    Cardiovascular:  Positive for dyspnea on exertion, leg swelling, orthopnea and paroxysmal nocturnal dyspnea. Negative for chest pain, irregular heartbeat, near-syncope, palpitations and syncope.   Respiratory:  Positive for cough and shortness of breath. Negative for hemoptysis and sputum production.    Skin:  Negative for rash.   Gastrointestinal:  Negative for abdominal pain, change in bowel habit, hematemesis, hematochezia, melena, nausea and vomiting.     Objective:     Vital Signs (Most Recent):  Temp: 99.1 °F (37.3 °C) (12/08/23 1940)  Pulse: 104 (12/08/23 1940)  Resp: 18 (12/08/23 1940)  BP: 134/83 (12/08/23 1940)  SpO2: 98 % (12/08/23 1940) Vital Signs (24h Range):  Temp:  [98 °F (36.7 °C)-99.1 °F (37.3 °C)] 99.1 °F (37.3 °C)  Pulse:  [] 104  Resp:  [13-23] 18  SpO2:  [89 %-99 %] 98 %  BP: (115-146)/() 134/83     Weight: 88.6 kg (195 lb 5.2 oz)  Body mass index is 31.53 kg/m².    SpO2: 98 %       No intake or output data in the 24 hours ending 12/08/23 2036    Lines/Drains/Airways       Peripherally Inserted Central Catheter Line  Duration             PICC Double Lumen 05/12/23 1534 right brachial 210 days                     Physical Exam  Vitals reviewed.   Constitutional:       Appearance: Normal appearance. She is obese.   HENT:      Head: Normocephalic and atraumatic.   Eyes:      General: No scleral icterus.     Conjunctiva/sclera: Conjunctivae normal.  "  Cardiovascular:      Rate and Rhythm: Normal rate and regular rhythm.      Heart sounds: Murmur heard.   Pulmonary:      Effort: Pulmonary effort is normal.   Abdominal:      General: There is no distension.   Musculoskeletal:         General: Swelling present.      Right lower leg: Edema present.      Left lower leg: Edema present.   Skin:     General: Skin is warm.   Neurological:      Mental Status: She is alert.          Significant Labs: All pertinent lab results from the last 24 hours have been reviewed.    Significant Imaging: Echocardiogram: Transthoracic echo (TTE) complete ([facility] Only):   Results for orders placed or performed during the hospital encounter of 03/26/23   Echo   Result Value Ref Range    BSA 1.99 m2    TDI SEPTAL 0.05 m/s    LV LATERAL E/E' RATIO 15.00 m/s    LV SEPTAL E/E' RATIO 15.00 m/s    LA WIDTH 4.35 cm    TDI LATERAL 0.05 m/s    LVIDd 7.07 (A) 3.5 - 6.0 cm    IVS 1.70 (A) 0.6 - 1.1 cm    Posterior Wall 1.30 0.6 - 1.1 cm    LVIDs 6.10 (A) 2.1 - 4.0 cm    FS 14 28 - 44 %    LA volume 166.06 cm3    Sinus 2.74 cm    STJ 3.31 cm    Ascending aorta 3.19 cm    LV mass 556.17 g    LA size 6.64 cm    RVDD 3.58 cm    TAPSE 1.50 cm    RV S' 9 cm/s    Left Ventricle Relative Wall Thickness 0.37 cm    AV mean gradient 5 mmHg    AV valve area 2.14 cm2    AV Velocity Ratio 0.64     AV index (prosthetic) 0.56     MV valve area p 1/2 method 3.95 cm2    E/A ratio 1.07     Mean e' 0.05 m/s    E wave deceleration time 192.14 msec    MV "A" wave duration 7.14 msec    Pulm vein S/D ratio 1.42     LVOT diameter 2.20 cm    LVOT area 3.8 cm2    LVOT peak marcos 0.89 m/s    LVOT peak VTI 12.67 cm    Ao peak marcos 1.40 m/s    Ao VTI 22.54 cm    LVOT stroke volume 48.14 cm3    AV peak gradient 8 mmHg    E/E' ratio 15.00 m/s    MV Peak E Marcos 0.75 m/s    TR Max Marcos 3.28 m/s    MV stenosis pressure 1/2 time 55.72 ms    MV Peak A Marcos 0.70 m/s    PV Peak S Marcos 0.54 m/s    PV Peak D Marcos 0.38 m/s    LV Systolic Volume " 202.54 mL    LV Systolic Volume Index 103.9 mL/m2    LV Diastolic Volume 261.54 mL    LV Diastolic Volume Index 134.12 mL/m2    LA Volume Index 85.2 mL/m2    LV Mass Index 285 g/m2    RA Major Axis 5.91 cm    Left Atrium Minor Axis 6.86 cm    Left Atrium Major Axis 6.67 cm    Triscuspid Valve Regurgitation Peak Gradient 43 mmHg    LA Volume Index (Mod) 40.1 mL/m2    LA volume (mod) 78.16 cm3    RA Width 4.20 cm    Right Atrial Pressure (from IVC) 8 mmHg    EF 15 %    TV resting pulmonary artery pressure 51 mmHg    Narrative    · The left ventricle is severely enlarged with eccentric hypertrophy and   severely decreased systolic function. The estimated ejection fraction is   15%.  · Normal right ventricular size with moderately reduced right ventricular   systolic function.  · Grade II left ventricular diastolic dysfunction.  · Biatrial enlargement.  · Mild mitral regurgitation.  · Small pericardial effusion.  · The estimated PA systolic pressure is 51 mmHg (by tricuspid   regurgitation velocity). The estimated mean PA pressure is 39mm Hg.  · Intermediate central venous pressure (8 mmHg).

## 2023-12-09 NOTE — SUBJECTIVE & OBJECTIVE
Interval History: Admitted overnight to CCU service. Continued on  5. Diuresing well with IV lasix gtt. Patient reports some improvement in respiratory sxs this morning.     Review of Systems   Constitutional: Negative for chills, decreased appetite, diaphoresis, fever and night sweats.   HENT: Negative.     Eyes: Negative.    Cardiovascular:  Positive for dyspnea on exertion, leg swelling, orthopnea and paroxysmal nocturnal dyspnea. Negative for chest pain, irregular heartbeat, near-syncope, palpitations and syncope.   Respiratory:  Positive for cough and shortness of breath. Negative for hemoptysis and sputum production.    Skin:  Negative for rash.   Gastrointestinal:  Negative for abdominal pain, change in bowel habit, hematemesis, hematochezia, melena, nausea and vomiting.     Objective:     Vital Signs (Most Recent):  Temp: 98.5 °F (36.9 °C) (12/09/23 0754)  Pulse: 89 (12/09/23 0754)  Resp: 20 (12/09/23 0754)  BP: (!) 145/90 (12/09/23 0754)  SpO2: 98 % (12/09/23 0754) Vital Signs (24h Range):  Temp:  [97.4 °F (36.3 °C)-99.1 °F (37.3 °C)] 98.5 °F (36.9 °C)  Pulse:  [] 89  Resp:  [15-23] 20  SpO2:  [96 %-99 %] 98 %  BP: (108-145)/(64-90) 145/90     Weight: 88.6 kg (195 lb 5.2 oz)  Body mass index is 31.53 kg/m².     SpO2: 98 %         Intake/Output Summary (Last 24 hours) at 12/9/2023 0908  Last data filed at 12/9/2023 0901  Gross per 24 hour   Intake 480 ml   Output 1200 ml   Net -720 ml       Lines/Drains/Airways       Peripherally Inserted Central Catheter Line  Duration             PICC Double Lumen 05/12/23 1534 right brachial 210 days                       Physical Exam  Vitals reviewed.   Constitutional:       Appearance: Normal appearance. She is obese.   HENT:      Head: Normocephalic and atraumatic.      Nose: Congestion and rhinorrhea present.      Mouth/Throat:      Mouth: Mucous membranes are dry.   Eyes:      General: No scleral icterus.     Extraocular Movements: Extraocular movements  intact.      Conjunctiva/sclera: Conjunctivae normal.      Pupils: Pupils are equal, round, and reactive to light.   Cardiovascular:      Rate and Rhythm: Normal rate and regular rhythm.      Pulses: Normal pulses.      Heart sounds: Murmur heard.   Pulmonary:      Effort: Pulmonary effort is normal.   Abdominal:      General: There is no distension.   Musculoskeletal:         General: Swelling present.      Right lower leg: Edema present.      Left lower leg: Edema present.   Skin:     General: Skin is warm.   Neurological:      General: No focal deficit present.      Mental Status: She is alert and oriented to person, place, and time.   Psychiatric:         Mood and Affect: Mood normal.         Behavior: Behavior normal.            Significant Labs: CMP   Recent Labs   Lab 12/07/23 2039 12/08/23 2023    141   K 4.2 4.8    107   CO2 24 20*   * 153*   BUN 74* 79*   CREATININE 2.4* 2.5*   CALCIUM 9.0 9.1   PROT 6.6 6.4   ALBUMIN 3.6 3.5   BILITOT 1.6* 1.6*   ALKPHOS 56 54*   AST 22 24   ALT 28 24   ANIONGAP 12 14    and CBC   Recent Labs   Lab 12/07/23 2039 12/08/23 2023   WBC 4.88 4.73   HGB 9.2* 9.0*   HCT 32.4* 29.6*    178       Significant Imaging:  All relevant imaging studies have been reviewed.

## 2023-12-09 NOTE — HOSPITAL COURSE
Patient admitted to CCU for management of acute decompensated end-stage HF. Patient continued on home  dose of 5. Started on lasix gtt and diuresing well. Continuing home entresto while admitted. Stopped BB given patient on dobutamine. Patient continues to diurese well. Added metolazone on 12/12 with improved diuresis. Transitioned to home bumetanide and daily metolazone. Patient stable for discharge to home with close cardiology follow up on 12/15.

## 2023-12-09 NOTE — PLAN OF CARE
Problem: Adult Inpatient Plan of Care  Goal: Plan of Care Review  Outcome: Ongoing, Progressing  Flowsheets (Taken 12/9/2023 1621)  Plan of Care Reviewed With: patient  Goal: Patient-Specific Goal (Individualized)  Outcome: Ongoing, Progressing  Goal: Optimal Comfort and Wellbeing  Outcome: Ongoing, Progressing     Problem: Diabetes Comorbidity  Goal: Blood Glucose Level Within Targeted Range  Outcome: Ongoing, Progressing     Problem: Fluid Volume Excess  Goal: Fluid Balance  Outcome: Ongoing, Progressing    AAOX4,VSS,O2 sats > 95% on 2L NC. Plan of care discussed with patient. Patient has no complaints of chest pain/SOB/palpitations. Pt ambulating  independently fall precautions in place,no falls/injuries through the shift.Discussed medications and care.  and lasix continuously infusing. Patient has no questions at this time.Pt resting comfortably with no acute distress.Call light within reach,bed at lowest position.     Nurses Note -- 4 Eyes      12/9/2023   4:23 PM      Skin assessed during: Daily Assessment      [x] No Altered Skin Integrity Present    []Prevention Measures Documented      [] Yes- Altered Skin Integrity Present or Discovered   [] LDA Added if Not in Epic (Describe Wound)   [] New Altered Skin Integrity was Present on Admit and Documented in LDA   [] Wound Image Taken    Wound Care Consulted? No    Attending Nurse:  Rosemarie Okeefe RN/Staff Member:  CHANTELL Del Castillo

## 2023-12-10 LAB
ALBUMIN SERPL BCP-MCNC: 3.4 G/DL (ref 3.5–5.2)
ALBUMIN SERPL BCP-MCNC: 3.5 G/DL (ref 3.5–5.2)
ALP SERPL-CCNC: 51 U/L (ref 55–135)
ALP SERPL-CCNC: 55 U/L (ref 55–135)
ALT SERPL W/O P-5'-P-CCNC: 22 U/L (ref 10–44)
ALT SERPL W/O P-5'-P-CCNC: 23 U/L (ref 10–44)
ANION GAP SERPL CALC-SCNC: 11 MMOL/L (ref 8–16)
ANION GAP SERPL CALC-SCNC: 16 MMOL/L (ref 8–16)
AST SERPL-CCNC: 20 U/L (ref 10–40)
AST SERPL-CCNC: 20 U/L (ref 10–40)
BILIRUB SERPL-MCNC: 0.9 MG/DL (ref 0.1–1)
BILIRUB SERPL-MCNC: 1.1 MG/DL (ref 0.1–1)
BUN SERPL-MCNC: 72 MG/DL (ref 6–20)
BUN SERPL-MCNC: 84 MG/DL (ref 6–20)
CALCIUM SERPL-MCNC: 9 MG/DL (ref 8.7–10.5)
CALCIUM SERPL-MCNC: 9.5 MG/DL (ref 8.7–10.5)
CHLORIDE SERPL-SCNC: 103 MMOL/L (ref 95–110)
CHLORIDE SERPL-SCNC: 105 MMOL/L (ref 95–110)
CO2 SERPL-SCNC: 22 MMOL/L (ref 23–29)
CO2 SERPL-SCNC: 24 MMOL/L (ref 23–29)
CREAT SERPL-MCNC: 2.7 MG/DL (ref 0.5–1.4)
CREAT SERPL-MCNC: 3 MG/DL (ref 0.5–1.4)
EST. GFR  (NO RACE VARIABLE): 17.5 ML/MIN/1.73 M^2
EST. GFR  (NO RACE VARIABLE): 19.8 ML/MIN/1.73 M^2
FERRITIN SERPL-MCNC: 118 NG/ML (ref 20–300)
GLUCOSE SERPL-MCNC: 112 MG/DL (ref 70–110)
GLUCOSE SERPL-MCNC: 134 MG/DL (ref 70–110)
IRON SERPL-MCNC: 72 UG/DL (ref 30–160)
MAGNESIUM SERPL-MCNC: 2.2 MG/DL (ref 1.6–2.6)
MAGNESIUM SERPL-MCNC: 2.2 MG/DL (ref 1.6–2.6)
POTASSIUM SERPL-SCNC: 4 MMOL/L (ref 3.5–5.1)
POTASSIUM SERPL-SCNC: 4.2 MMOL/L (ref 3.5–5.1)
PROT SERPL-MCNC: 6 G/DL (ref 6–8.4)
PROT SERPL-MCNC: 6.4 G/DL (ref 6–8.4)
SATURATED IRON: 26 % (ref 20–50)
SODIUM SERPL-SCNC: 140 MMOL/L (ref 136–145)
SODIUM SERPL-SCNC: 141 MMOL/L (ref 136–145)
TOTAL IRON BINDING CAPACITY: 277 UG/DL (ref 250–450)
TRANSFERRIN SERPL-MCNC: 187 MG/DL (ref 200–375)

## 2023-12-10 PROCEDURE — 80053 COMPREHEN METABOLIC PANEL: CPT | Mod: 91

## 2023-12-10 PROCEDURE — 63600175 PHARM REV CODE 636 W HCPCS: Performed by: STUDENT IN AN ORGANIZED HEALTH CARE EDUCATION/TRAINING PROGRAM

## 2023-12-10 PROCEDURE — 36415 COLL VENOUS BLD VENIPUNCTURE: CPT

## 2023-12-10 PROCEDURE — 83540 ASSAY OF IRON: CPT

## 2023-12-10 PROCEDURE — 99231 PR SUBSEQUENT HOSPITAL CARE,LEVL I: ICD-10-PCS | Mod: ,,, | Performed by: INTERNAL MEDICINE

## 2023-12-10 PROCEDURE — 25000003 PHARM REV CODE 250: Performed by: STUDENT IN AN ORGANIZED HEALTH CARE EDUCATION/TRAINING PROGRAM

## 2023-12-10 PROCEDURE — 99231 SBSQ HOSP IP/OBS SF/LOW 25: CPT | Mod: ,,, | Performed by: INTERNAL MEDICINE

## 2023-12-10 PROCEDURE — 20600001 HC STEP DOWN PRIVATE ROOM

## 2023-12-10 PROCEDURE — 25000003 PHARM REV CODE 250

## 2023-12-10 PROCEDURE — 83735 ASSAY OF MAGNESIUM: CPT

## 2023-12-10 PROCEDURE — 84466 ASSAY OF TRANSFERRIN: CPT

## 2023-12-10 PROCEDURE — 82728 ASSAY OF FERRITIN: CPT

## 2023-12-10 RX ADMIN — GUAIFENESIN 200 MG: 200 SOLUTION ORAL at 11:12

## 2023-12-10 RX ADMIN — APIXABAN 5 MG: 5 TABLET, FILM COATED ORAL at 09:12

## 2023-12-10 RX ADMIN — SACUBITRIL AND VALSARTAN 1 TABLET: 49; 51 TABLET, FILM COATED ORAL at 08:12

## 2023-12-10 RX ADMIN — ONDANSETRON 4 MG: 4 TABLET, ORALLY DISINTEGRATING ORAL at 12:12

## 2023-12-10 RX ADMIN — APIXABAN 5 MG: 5 TABLET, FILM COATED ORAL at 08:12

## 2023-12-10 RX ADMIN — FERROUS SULFATE TAB EC 325 MG (65 MG FE EQUIVALENT) 1 EACH: 325 (65 FE) TABLET DELAYED RESPONSE at 06:12

## 2023-12-10 RX ADMIN — GUAIFENESIN 200 MG: 200 SOLUTION ORAL at 08:12

## 2023-12-10 RX ADMIN — CEFTRIAXONE 1 G: 1 INJECTION, POWDER, FOR SOLUTION INTRAMUSCULAR; INTRAVENOUS at 11:12

## 2023-12-10 RX ADMIN — AMIODARONE HYDROCHLORIDE 200 MG: 200 TABLET ORAL at 08:12

## 2023-12-10 RX ADMIN — ATORVASTATIN CALCIUM 40 MG: 40 TABLET, FILM COATED ORAL at 09:12

## 2023-12-10 RX ADMIN — METOPROLOL SUCCINATE 50 MG: 50 TABLET, EXTENDED RELEASE ORAL at 08:12

## 2023-12-10 RX ADMIN — SACUBITRIL AND VALSARTAN 1 TABLET: 49; 51 TABLET, FILM COATED ORAL at 09:12

## 2023-12-10 RX ADMIN — ALLOPURINOL 300 MG: 300 TABLET ORAL at 08:12

## 2023-12-10 NOTE — PROGRESS NOTES
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 She initially presented to the OSH for worsening SOB.   on arrival to [facility] ED labs were signficant for cr 2.4 (bl  ), BNP was noted to be 3000, Trop 1.036, procal 0.35.  on arrival to [facility] she was found to be hemodynamically stable. Denies cp/chest discomfort but does report sob/iniguez, pnd.    TTE on 3/27/23 with EF 15%, PASP 51 mmhg      Hospital Course:   Patient admitted to CCU for management of acute decompensated end-stage HF. Patient continued on home  dose of 5. Started on lasix gtt and diuresing well. Continuing home entresto while admitted. Stopped BB given patient on dobutamine.    Interval History: NAEON, 3L UOP yesterday. Patient reports improvement in sxs, though has continued upper airway congestion. Continuing diuresis.    Review of Systems   Constitutional: Negative for chills, decreased appetite, diaphoresis, fever and night sweats.   HENT: Negative.     Eyes: Negative.    Cardiovascular:  Positive for dyspnea on exertion, leg swelling, orthopnea and paroxysmal nocturnal dyspnea. Negative for chest pain, irregular heartbeat, near-syncope, palpitations and syncope.   Respiratory:  Positive for cough and shortness of breath. Negative for hemoptysis and sputum production.    Skin:  Negative for rash.   Gastrointestinal:  Negative for abdominal pain, change in bowel habit, hematemesis, hematochezia, melena, nausea and vomiting.      Objective:     Vital Signs (Most Recent):  Temp: 98.4 °F (36.9 °C) (12/10/23 0707)  Pulse: 96 (12/10/23 0755)  Resp: 18 (12/10/23 0707)  BP: 108/67 (12/10/23 0707)  SpO2: 95 % (12/10/23 0707) Vital Signs (24h Range):  Temp:  [97.5 °F (36.4 °C)-98.7 °F (37.1 °C)] 98.4 °F (36.9 °C)  Pulse:  [82-96] 96  Resp:  [18-20] 18  SpO2:  [94 %-98 %] 95 %  BP: (108-137)/(67-88) 108/67     Weight: 88.6 kg (195 lb 5.2 oz)  Body mass index is 31.53 kg/m².     SpO2: 95 %         Intake/Output Summary (Last 24 hours) at 12/10/2023 0836  Last data filed at 12/10/2023 0806  Gross per 24 hour   Intake 1934.25 ml   Output 3600 ml   Net -1665.75 ml         Lines/Drains/Airways       Peripherally Inserted Central Catheter Line  Duration             PICC Double Lumen 05/12/23 1534 right brachial 211 days                       Physical Exam  Vitals reviewed.   Constitutional:       Appearance: Normal appearance. She is obese.   HENT:      Head: Normocephalic and atraumatic.      Nose: Congestion and rhinorrhea present.      Mouth/Throat:      Mouth: Mucous membranes are dry.   Eyes:      General: No scleral icterus.     Extraocular Movements: Extraocular movements intact.      Conjunctiva/sclera: Conjunctivae normal.      Pupils: Pupils are equal, round, and reactive to light.   Cardiovascular:      Rate and Rhythm: Normal rate and regular rhythm.      Pulses: Normal pulses.      Heart sounds: Murmur heard.   Pulmonary:      Effort: Pulmonary effort is normal.   Abdominal:      General: There is no distension.   Musculoskeletal:         General: Swelling present.      Right lower leg: Edema present.      Left lower leg: Edema present.   Skin:     General: Skin is warm.   Neurological:      General: No focal deficit present.      Mental Status: She is alert and oriented to person, place, and time.   Psychiatric:         Mood and Affect: Mood normal.         Behavior: Behavior normal.            Significant Labs: CMP   Recent Labs   Lab  12/08/23 2023 12/09/23  1044 12/10/23  0656    144 140   K 4.8 4.5 4.0    108 105   CO2 20* 25 24   * 134* 112*   BUN 79* 68* 72*   CREATININE 2.5* 2.5* 2.7*   CALCIUM 9.1 9.3 9.0   PROT 6.4  --  6.0   ALBUMIN 3.5  --  3.4*   BILITOT 1.6*  --  1.1*   ALKPHOS 54*  --  51*   AST 24  --  20   ALT 24  --  22   ANIONGAP 14 11 11      and CBC   Recent Labs   Lab 12/08/23 2023   WBC 4.73   HGB 9.0*   HCT 29.6*            Significant Imaging:  All relevant imaging studies have been reviewed.  Assessment and Plan:       * Acute decompensated heart failure  Patient is identified as having Combined Systolic and Diastolic heart failure that is Acute on chronic. CHF is currently uncontrolled due to Continued edema of extremities and JVD, Dyspnea not returned to baseline after several doses of IV diuretic, >3 pillow orthopnea, Rales/crackles on pulmonary exam, and Pulmonary edema/pleural effusion on CXR. Latest ECHO performed and demonstrates- Results for orders placed during the hospital encounter of 03/26/23    Echo    Interpretation Summary  · The left ventricle is severely enlarged with eccentric hypertrophy and severely decreased systolic function. The estimated ejection fraction is 15%.  · Normal right ventricular size with moderately reduced right ventricular systolic function.  · Grade II left ventricular diastolic dysfunction.  · Biatrial enlargement.  · Mild mitral regurgitation.  · Small pericardial effusion.  · The estimated PA systolic pressure is 51 mmHg (by tricuspid regurgitation velocity). The estimated mean PA pressure is 39mm Hg.  · Intermediate central venous pressure (8 mmHg).  . Continue ARNI and monitor clinical status closely. Monitor on telemetry. Patient is off CHF pathway.  Monitor strict Is&Os and daily weights.  Place on fluid restriction of 1.5 L. Continue to stress to patient importance of self efficacy and  on diet for CHF. 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 Michel, her goal is to cont with current therapy and go to [facility] to see if she can be a candidate for transplant.    Diuresing well on lasix gtt 10/hr  Cont home  at 5  Will cont Entresto  Strict I&O  Daily wt    CKD (chronic kidney disease) stage 4, GFR 15-29 ml/min  Bl creatinine 2.2-2.6  Currently within bl range    Paroxysmal A-fib  Will cont AC with eliquis  Rate/rhythm control with amiodarone  Close monitoring of HR given patient on dobutamine    Pulmonary hypertension  Pre and post capillary PH  RHC on 3/29/23 with following findings:  Filling pressures: RA 9, PCWP 16  PA 58/29, mean PA pressure 39 mmHg  PA saturation 45%, Ao saturation 90% on RA  PVR 5.6 BOLDEN, consistent with pre and post-capillary pulmonary hypertension  Gillian CO/CI: 4.1/2.03  SVR: 1151  BP 88/58 , MAP 68, HR 75 SR  Hb 10.7  Jayson 3.2    Will cont diuresis    CKD (chronic kidney disease), stage IV  Patient on admission with creatinine 2.4 from baseline 2.2.     -- avoid nephrotoxic medications  -- renally dose meds  -- maintain MAP > 65  -- daily CMP      Type 2 diabetes mellitus with stage 4 chronic kidney disease, without long-term current use of insulin  SSI while in the hospital and will transition to LA if needed    Essential hypertension  -- continue home entresto while admitted    Microcytic anemia  -- hgb on admission at 9 (at baseline)  -- continue home iron supplementation  -- daily CBC, transfuse for Hgb < 7        VTE Risk Mitigation (From admission, onward)           Ordered     apixaban tablet 5 mg  2 times daily         12/08/23 2055     IP VTE HIGH RISK PATIENT  Once         12/08/23 1945     Place sequential compression device  Until discontinued         12/08/23 1945                    Galo Russ MD  Cardiology  [facility]  - Cardiology Stepdown

## 2023-12-10 NOTE — NURSING
"   Problem: Adult Inpatient Plan of Care  Goal: Plan of Care Review  Outcome: Ongoing, Progressing  Flowsheets (Taken 12/9/2023 1621)  Plan of Care Reviewed With: patient  Goal: Patient-Specific Goal (Individualized)  Outcome: Ongoing, Progressing  Goal: Optimal Comfort and Wellbeing  Outcome: Ongoing, Progressing     Problem: Diabetes Comorbidity  Goal: Blood Glucose Level Within Targeted Range  Outcome: Ongoing, Progressing     Problem: Fluid Volume Excess  Goal: Fluid Balance  Outcome: Ongoing, Progressing     AAOX4,VSS,O2 sats > 95% on 2L NC. Plan of care discussed with patient. Patient has no complaints of chest pain/SOB/palpitations. Pt c/o cough. Pt states " feeling better" after Guaifenesin PRN. Pt ambulating  independently fall precautions in place,no falls/injuries through the shift.Discussed medications and care.  and lasix continuously infusing. Patient has no questions at this time.Pt resting comfortably with no acute distress.Call light within reach,bed at lowest position.      Nurses Note -- 4 Eyes        12/10/2023   3:53 PM        Skin assessed during: Daily Assessment        [x] No Altered Skin Integrity Present           []Prevention Measures Documented        [] Yes- Altered Skin Integrity Present or Discovered          [] LDA Added if Not in Epic (Describe Wound)          [] New Altered Skin Integrity was Present on Admit and Documented in LDA          [] Wound Image Taken     Wound Care Consulted? No     Attending Nurse:  Rosemarie Okeefe RN/Staff Member:  CHANTELL Del Castillo         "

## 2023-12-10 NOTE — SUBJECTIVE & OBJECTIVE
Interval History: NAEON, 3L UOP yesterday. Patient reports improvement in sxs, though has continued upper airway congestion. Continuing diuresis.    Review of Systems   Constitutional: Negative for chills, decreased appetite, diaphoresis, fever and night sweats.   HENT: Negative.     Eyes: Negative.    Cardiovascular:  Positive for dyspnea on exertion, leg swelling, orthopnea and paroxysmal nocturnal dyspnea. Negative for chest pain, irregular heartbeat, near-syncope, palpitations and syncope.   Respiratory:  Positive for cough and shortness of breath. Negative for hemoptysis and sputum production.    Skin:  Negative for rash.   Gastrointestinal:  Negative for abdominal pain, change in bowel habit, hematemesis, hematochezia, melena, nausea and vomiting.     Objective:     Vital Signs (Most Recent):  Temp: 98.4 °F (36.9 °C) (12/10/23 0707)  Pulse: 96 (12/10/23 0755)  Resp: 18 (12/10/23 0707)  BP: 108/67 (12/10/23 0707)  SpO2: 95 % (12/10/23 0707) Vital Signs (24h Range):  Temp:  [97.5 °F (36.4 °C)-98.7 °F (37.1 °C)] 98.4 °F (36.9 °C)  Pulse:  [82-96] 96  Resp:  [18-20] 18  SpO2:  [94 %-98 %] 95 %  BP: (108-137)/(67-88) 108/67     Weight: 88.6 kg (195 lb 5.2 oz)  Body mass index is 31.53 kg/m².     SpO2: 95 %         Intake/Output Summary (Last 24 hours) at 12/10/2023 0836  Last data filed at 12/10/2023 0806  Gross per 24 hour   Intake 1934.25 ml   Output 3600 ml   Net -1665.75 ml         Lines/Drains/Airways       Peripherally Inserted Central Catheter Line  Duration             PICC Double Lumen 05/12/23 1534 right brachial 211 days                       Physical Exam  Vitals reviewed.   Constitutional:       Appearance: Normal appearance. She is obese.   HENT:      Head: Normocephalic and atraumatic.      Nose: Congestion and rhinorrhea present.      Mouth/Throat:      Mouth: Mucous membranes are dry.   Eyes:      General: No scleral icterus.     Extraocular Movements: Extraocular movements intact.       Conjunctiva/sclera: Conjunctivae normal.      Pupils: Pupils are equal, round, and reactive to light.   Cardiovascular:      Rate and Rhythm: Normal rate and regular rhythm.      Pulses: Normal pulses.      Heart sounds: Murmur heard.   Pulmonary:      Effort: Pulmonary effort is normal.   Abdominal:      General: There is no distension.   Musculoskeletal:         General: Swelling present.      Right lower leg: Edema present.      Left lower leg: Edema present.   Skin:     General: Skin is warm.   Neurological:      General: No focal deficit present.      Mental Status: She is alert and oriented to person, place, and time.   Psychiatric:         Mood and Affect: Mood normal.         Behavior: Behavior normal.            Significant Labs: CMP   Recent Labs   Lab 12/08/23 2023 12/09/23  1044 12/10/23  0656    144 140   K 4.8 4.5 4.0    108 105   CO2 20* 25 24   * 134* 112*   BUN 79* 68* 72*   CREATININE 2.5* 2.5* 2.7*   CALCIUM 9.1 9.3 9.0   PROT 6.4  --  6.0   ALBUMIN 3.5  --  3.4*   BILITOT 1.6*  --  1.1*   ALKPHOS 54*  --  51*   AST 24  --  20   ALT 24  --  22   ANIONGAP 14 11 11      and CBC   Recent Labs   Lab 12/08/23 2023   WBC 4.73   HGB 9.0*   HCT 29.6*            Significant Imaging:  All relevant imaging studies have been reviewed.

## 2023-12-10 NOTE — PLAN OF CARE
CM to bedside - pt provided assessment info and CM obtained info from chart review. Pt w/ DME in place, lives w/ spouse, granddgtr and great-grandchild. Pt is current w/ [facility] for dobutamine gtt - pt gets labs and line care at [facility]. Pt will resume infusion - CM forwarded referral to [facility] to resume care.     Covering for chika  JOHN BustillosN, RN, [facility]  Transitional Care Manager  [phone]  [email or web address]    [facility] - Cardiology Stepdown  Initial Discharge Assessment       Primary Care Provider: Cristobal Ann MD    Admission Diagnosis: Heart failure [I50.9]    Admission Date: 12/8/2023  Expected Discharge Date: 12/13/2023    Transition of Care Barriers: None    Payor: MEDICAID / Plan: [employer] [place] ([employer]) / Product Type: Managed Medicaid /     Extended Emergency Contact Information  Primary Emergency Contact: Jaye Baum  Address: [address] [address] [facility]  Home Phone: [phone]  Relation: Sister  Secondary Emergency Contact: Jerrod Hawley  Mobile Phone: [phone]  Relation: Spouse    Discharge Plan A: Home with family  Discharge Plan B: Home with family, Home Health      [facility] [address] [place] [address]  Phone: [phone] Fax: [phone]      Initial Assessment (most recent)       Adult Discharge Assessment - 12/10/23 0915          Discharge Assessment    Assessment Type Discharge Planning Assessment     Confirmed/corrected address, phone number and insurance Yes     Confirmed Demographics Correct on Facesheet     Source of Information patient;health record     People in Home grandchild(ricky);spouse     Facility Arrived From: ED at [facility]     Do you expect to return to your current living situation? Yes     Prior to hospitilization cognitive status: Alert/Oriented     Current cognitive status: Alert/Oriented     Equipment Currently Used at Home nebulizer;oxygen;shower  chair;rollator;bedside commode;cane, straight     Readmission within 30 days? No     Patient currently being followed by outpatient case management? Unable to determine (comments)     Do you currently have service(s) that help you manage your care at home? Yes     Name and Contact number of agency [facility] for infusion     Is the pt/caregiver preference to resume services with current agency Yes     Discharge Plan A Home with family     Discharge Plan B Home with family;Home Health     DME Needed Upon Discharge  none     Discharge Plan discussed with: Patient     Transition of Care Barriers None        Physical Activity    On average, how many days per week do you engage in moderate to strenuous exercise (like a brisk walk)? 0 days     On average, how many minutes do you engage in exercise at this level? 0 min        Financial Resource Strain    How hard is it for you to pay for the very basics like food, housing, medical care, and heating? Not very hard        Housing Stability    In the last 12 months, was there a time when you were not able to pay the mortgage or rent on time? No     In the last 12 months, how many places have you lived? 1     In the last 12 months, was there a time when you did not have a steady place to sleep or slept in a shelter (including now)? No        Transportation Needs    In the past 12 months, has lack of transportation kept you from medical appointments or from getting medications? No     In the past 12 months, has lack of transportation kept you from meetings, work, or from getting things needed for daily living? No        Food Insecurity    Within the past 12 months, you worried that your food would run out before you got the money to buy more. Never true     Within the past 12 months, the food you bought just didn't last and you didn't have money to get more. Never true        Stress    Do you feel stress - tense, restless, nervous, or anxious, or unable to sleep at night  because your mind is troubled all the time - these days? Only a little        Social Connections    In a typical week, how many times do you talk on the phone with family, friends, or neighbors? Once a week     How often do you get together with friends or relatives? Once a week     How often do you attend Sikh or Roman Catholic services? 1 to 4 times per year     Do you belong to any clubs or organizations such as Sikh groups, unions, fraternal or athletic groups, or school groups? No     How often do you attend meetings of the clubs or organizations you belong to? Never     Are you , , , , never , or living with a partner?         Alcohol Use    Q1: How often do you have a drink containing alcohol? Monthly or less     Q2: How many drinks containing alcohol do you have on a typical day when you are drinking? 1 or 2     Q3: How often do you have six or more drinks on one occasion? Monthly

## 2023-12-11 LAB
ADENOVIRUS: NOT DETECTED
ALBUMIN SERPL BCP-MCNC: 3.2 G/DL (ref 3.5–5.2)
ALBUMIN SERPL BCP-MCNC: 3.6 G/DL (ref 3.5–5.2)
ALP SERPL-CCNC: 50 U/L (ref 55–135)
ALP SERPL-CCNC: 58 U/L (ref 55–135)
ALT SERPL W/O P-5'-P-CCNC: 21 U/L (ref 10–44)
ALT SERPL W/O P-5'-P-CCNC: 23 U/L (ref 10–44)
ANION GAP SERPL CALC-SCNC: 11 MMOL/L (ref 8–16)
ANION GAP SERPL CALC-SCNC: 14 MMOL/L (ref 8–16)
AST SERPL-CCNC: 17 U/L (ref 10–40)
AST SERPL-CCNC: 22 U/L (ref 10–40)
BASOPHILS # BLD AUTO: 0.03 K/UL (ref 0–0.2)
BASOPHILS NFR BLD: 0.8 % (ref 0–1.9)
BILIRUB SERPL-MCNC: 0.8 MG/DL (ref 0.1–1)
BILIRUB SERPL-MCNC: 0.9 MG/DL (ref 0.1–1)
BNP SERPL-MCNC: 3334 PG/ML (ref 0–99)
BORDETELLA PARAPERTUSSIS (IS1001): NOT DETECTED
BORDETELLA PERTUSSIS (PTXP): NOT DETECTED
BUN SERPL-MCNC: 85 MG/DL (ref 6–20)
BUN SERPL-MCNC: 88 MG/DL (ref 6–20)
CALCIUM SERPL-MCNC: 8.8 MG/DL (ref 8.7–10.5)
CALCIUM SERPL-MCNC: 9.2 MG/DL (ref 8.7–10.5)
CHLAMYDIA PNEUMONIAE: NOT DETECTED
CHLORIDE SERPL-SCNC: 104 MMOL/L (ref 95–110)
CHLORIDE SERPL-SCNC: 106 MMOL/L (ref 95–110)
CO2 SERPL-SCNC: 24 MMOL/L (ref 23–29)
CO2 SERPL-SCNC: 24 MMOL/L (ref 23–29)
CORONAVIRUS 229E, COMMON COLD VIRUS: NOT DETECTED
CORONAVIRUS HKU1, COMMON COLD VIRUS: NOT DETECTED
CORONAVIRUS NL63, COMMON COLD VIRUS: NOT DETECTED
CORONAVIRUS OC43, COMMON COLD VIRUS: NOT DETECTED
CREAT SERPL-MCNC: 2.8 MG/DL (ref 0.5–1.4)
CREAT SERPL-MCNC: 2.9 MG/DL (ref 0.5–1.4)
DIFFERENTIAL METHOD: ABNORMAL
EOSINOPHIL # BLD AUTO: 0.1 K/UL (ref 0–0.5)
EOSINOPHIL NFR BLD: 2.8 % (ref 0–8)
ERYTHROCYTE [DISTWIDTH] IN BLOOD BY AUTOMATED COUNT: 32.2 % (ref 11.5–14.5)
EST. GFR  (NO RACE VARIABLE): 18.2 ML/MIN/1.73 M^2
EST. GFR  (NO RACE VARIABLE): 19 ML/MIN/1.73 M^2
FLUBV RNA NPH QL NAA+NON-PROBE: NOT DETECTED
GLUCOSE SERPL-MCNC: 103 MG/DL (ref 70–110)
GLUCOSE SERPL-MCNC: 133 MG/DL (ref 70–110)
HCT VFR BLD AUTO: 30.2 % (ref 37–48.5)
HGB BLD-MCNC: 9.1 G/DL (ref 12–16)
HPIV1 RNA NPH QL NAA+NON-PROBE: NOT DETECTED
HPIV2 RNA NPH QL NAA+NON-PROBE: NOT DETECTED
HPIV3 RNA NPH QL NAA+NON-PROBE: NOT DETECTED
HPIV4 RNA NPH QL NAA+NON-PROBE: NOT DETECTED
HUMAN METAPNEUMOVIRUS: NOT DETECTED
IMM GRANULOCYTES # BLD AUTO: 0.02 K/UL (ref 0–0.04)
IMM GRANULOCYTES NFR BLD AUTO: 0.5 % (ref 0–0.5)
INFLUENZA A (SUBTYPES H1,H1-2009,H3): NOT DETECTED
LYMPHOCYTES # BLD AUTO: 1 K/UL (ref 1–4.8)
LYMPHOCYTES NFR BLD: 25.5 % (ref 18–48)
MAGNESIUM SERPL-MCNC: 2.2 MG/DL (ref 1.6–2.6)
MAGNESIUM SERPL-MCNC: 2.3 MG/DL (ref 1.6–2.6)
MCH RBC QN AUTO: 18.9 PG (ref 27–31)
MCHC RBC AUTO-ENTMCNC: 30.1 G/DL (ref 32–36)
MCV RBC AUTO: 63 FL (ref 82–98)
MONOCYTES # BLD AUTO: 0.4 K/UL (ref 0.3–1)
MONOCYTES NFR BLD: 11.2 % (ref 4–15)
MYCOPLASMA PNEUMONIAE: NOT DETECTED
NEUTROPHILS # BLD AUTO: 2.3 K/UL (ref 1.8–7.7)
NEUTROPHILS NFR BLD: 59.2 % (ref 38–73)
NRBC BLD-RTO: 4 /100 WBC
PLATELET # BLD AUTO: 184 K/UL (ref 150–450)
PMV BLD AUTO: ABNORMAL FL (ref 9.2–12.9)
POTASSIUM SERPL-SCNC: 4.2 MMOL/L (ref 3.5–5.1)
POTASSIUM SERPL-SCNC: 4.7 MMOL/L (ref 3.5–5.1)
PROT SERPL-MCNC: 5.9 G/DL (ref 6–8.4)
PROT SERPL-MCNC: 6.6 G/DL (ref 6–8.4)
RBC # BLD AUTO: 4.81 M/UL (ref 4–5.4)
RESPIRATORY INFECTION PANEL SOURCE: NORMAL
RSV RNA NPH QL NAA+NON-PROBE: NOT DETECTED
RV+EV RNA NPH QL NAA+NON-PROBE: NOT DETECTED
SARS-COV-2 RNA RESP QL NAA+PROBE: NOT DETECTED
SODIUM SERPL-SCNC: 141 MMOL/L (ref 136–145)
SODIUM SERPL-SCNC: 142 MMOL/L (ref 136–145)
WBC # BLD AUTO: 3.92 K/UL (ref 3.9–12.7)

## 2023-12-11 PROCEDURE — 25000003 PHARM REV CODE 250: Performed by: STUDENT IN AN ORGANIZED HEALTH CARE EDUCATION/TRAINING PROGRAM

## 2023-12-11 PROCEDURE — 83880 ASSAY OF NATRIURETIC PEPTIDE: CPT | Performed by: STUDENT IN AN ORGANIZED HEALTH CARE EDUCATION/TRAINING PROGRAM

## 2023-12-11 PROCEDURE — 99231 SBSQ HOSP IP/OBS SF/LOW 25: CPT | Mod: ,,, | Performed by: INTERNAL MEDICINE

## 2023-12-11 PROCEDURE — 80053 COMPREHEN METABOLIC PANEL: CPT

## 2023-12-11 PROCEDURE — 99231 PR SUBSEQUENT HOSPITAL CARE,LEVL I: ICD-10-PCS | Mod: ,,, | Performed by: INTERNAL MEDICINE

## 2023-12-11 PROCEDURE — 36415 COLL VENOUS BLD VENIPUNCTURE: CPT | Performed by: STUDENT IN AN ORGANIZED HEALTH CARE EDUCATION/TRAINING PROGRAM

## 2023-12-11 PROCEDURE — 85025 COMPLETE CBC W/AUTO DIFF WBC: CPT

## 2023-12-11 PROCEDURE — 83735 ASSAY OF MAGNESIUM: CPT

## 2023-12-11 PROCEDURE — 63600175 PHARM REV CODE 636 W HCPCS: Performed by: STUDENT IN AN ORGANIZED HEALTH CARE EDUCATION/TRAINING PROGRAM

## 2023-12-11 PROCEDURE — 20600001 HC STEP DOWN PRIVATE ROOM

## 2023-12-11 PROCEDURE — 36415 COLL VENOUS BLD VENIPUNCTURE: CPT

## 2023-12-11 RX ADMIN — GUAIFENESIN 200 MG: 200 SOLUTION ORAL at 05:12

## 2023-12-11 RX ADMIN — ATORVASTATIN CALCIUM 40 MG: 40 TABLET, FILM COATED ORAL at 09:12

## 2023-12-11 RX ADMIN — GUAIFENESIN 200 MG: 200 SOLUTION ORAL at 09:12

## 2023-12-11 RX ADMIN — SACUBITRIL AND VALSARTAN 1 TABLET: 49; 51 TABLET, FILM COATED ORAL at 08:12

## 2023-12-11 RX ADMIN — APIXABAN 5 MG: 5 TABLET, FILM COATED ORAL at 09:12

## 2023-12-11 RX ADMIN — FUROSEMIDE 10 MG/HR: 10 INJECTION, SOLUTION INTRAMUSCULAR; INTRAVENOUS at 05:12

## 2023-12-11 RX ADMIN — ALLOPURINOL 300 MG: 300 TABLET ORAL at 08:12

## 2023-12-11 RX ADMIN — APIXABAN 5 MG: 5 TABLET, FILM COATED ORAL at 08:12

## 2023-12-11 RX ADMIN — DOBUTAMINE IN DEXTROSE 5 MCG/KG/MIN: 400 INJECTION, SOLUTION INTRAVENOUS at 06:12

## 2023-12-11 RX ADMIN — AMIODARONE HYDROCHLORIDE 200 MG: 200 TABLET ORAL at 08:12

## 2023-12-11 RX ADMIN — SACUBITRIL AND VALSARTAN 1 TABLET: 49; 51 TABLET, FILM COATED ORAL at 09:12

## 2023-12-11 NOTE — PROGRESS NOTES
[facility]  Cardiology  Progress Note    Patient Name: Hafsa Hawley  MRN: [id number]  Admission Date: 12/8/2023  Hospital Length of Stay: 3 days  Code Status: Full Code   Attending Physician: Althea Escalante MD   Primary Care Physician: Cristobal Ann MD  Expected Discharge Date: 12/13/2023  Principal Problem:Acute decompensated heart failure    Subjective:     HPI:  patient is a 58 y.o female with h/o end stage niCM with EF 10%, not a candidate for advanced options, on palliative home  who presents as transfer from [facility] ED for ADHF. She initially presented to the OSH for worsening SOB.   on arrival to [facility] ED labs were signficant for cr 2.4 (bl  ), BNP was noted to be 3000, Trop 1.036, procal 0.35.  on arrival to [facility] she was found to be hemodynamically stable. Denies cp/chest discomfort but does report sob/iniguez, pnd.    TTE on 3/27/23 with EF 15%, PASP 51 mmhg    Hospital Course:   Patient admitted to CCU for management of acute decompensated end-stage HF. Patient continued on home  dose of 5. Started on lasix gtt and diuresing well. Continuing home entresto while admitted. Stopped BB given patient on dobutamine. Patient continues to diurese well, NN 2 liters each day.     Interval History: NAEON, 3L UOP yesterday, NN 2L. Patient reports improvement in sxs, though has continued upper airway congestion. Continuing diuresis.    Review of Systems   Constitutional: Negative for chills, decreased appetite, diaphoresis, fever and night sweats.   HENT: Negative.     Eyes: Negative.    Cardiovascular:  Positive for dyspnea on exertion, leg swelling, orthopnea and paroxysmal nocturnal dyspnea. Negative for chest pain, irregular heartbeat, near-syncope, palpitations and syncope.   Respiratory:  Positive for cough and shortness of breath. Negative for hemoptysis and sputum production.    Skin:  Negative for rash.   Gastrointestinal:  Negative for abdominal pain, change in bowel habit,  hematemesis, hematochezia, melena, nausea and vomiting.     Objective:     Vital Signs (Most Recent):  Temp: 97.4 °F (36.3 °C) (12/11/23 0755)  Pulse: 82 (12/11/23 0755)  Resp: 17 (12/11/23 0755)  BP: 119/80 (12/11/23 0755)  SpO2: 97 % (12/11/23 0755) Vital Signs (24h Range):  Temp:  [97.3 °F (36.3 °C)-98.9 °F (37.2 °C)] 97.4 °F (36.3 °C)  Pulse:  [68-89] 82  Resp:  [16-20] 17  SpO2:  [88 %-100 %] 97 %  BP: (100-127)/(59-86) 119/80     Weight: 88.6 kg (195 lb 5.2 oz)  Body mass index is 31.53 kg/m².     SpO2: 97 %         Intake/Output Summary (Last 24 hours) at 12/11/2023 1048  Last data filed at 12/11/2023 0528  Gross per 24 hour   Intake 960 ml   Output 2525 ml   Net -1565 ml         Lines/Drains/Airways       Peripherally Inserted Central Catheter Line  Duration             PICC Double Lumen 05/12/23 1534 right brachial 212 days                       Physical Exam  Vitals reviewed.   Constitutional:       Appearance: Normal appearance. She is obese.   HENT:      Head: Normocephalic and atraumatic.      Nose: Congestion and rhinorrhea present.      Mouth/Throat:      Mouth: Mucous membranes are dry.   Eyes:      General: No scleral icterus.     Extraocular Movements: Extraocular movements intact.      Conjunctiva/sclera: Conjunctivae normal.      Pupils: Pupils are equal, round, and reactive to light.   Cardiovascular:      Rate and Rhythm: Normal rate and regular rhythm.      Pulses: Normal pulses.      Heart sounds: Murmur heard.   Pulmonary:      Effort: Pulmonary effort is normal.   Abdominal:      General: There is no distension.   Musculoskeletal:         General: Swelling present.      Right lower leg: Edema present.      Left lower leg: Edema present.   Skin:     General: Skin is warm.   Neurological:      General: No focal deficit present.      Mental Status: She is alert and oriented to person, place, and time.   Psychiatric:         Mood and Affect: Mood normal.         Behavior: Behavior normal.             Significant Labs: CMP   Recent Labs   Lab 12/10/23  0656 12/10/23  1903 12/11/23  0526    141 141   K 4.0 4.2 4.2    103 106   CO2 24 22* 24   * 134* 103   BUN 72* 84* 85*   CREATININE 2.7* 3.0* 2.9*   CALCIUM 9.0 9.5 8.8   PROT 6.0 6.4 5.9*   ALBUMIN 3.4* 3.5 3.2*   BILITOT 1.1* 0.9 0.8   ALKPHOS 51* 55 50*   AST 20 20 22   ALT 22 23 23   ANIONGAP 11 16 11      and CBC   Recent Labs   Lab 12/11/23  0526   WBC 3.92   HGB 9.1*   HCT 30.2*            Significant Imaging:  All relevant imaging studies have been reviewed.  Assessment and Plan:       * Acute decompensated heart failure  Patient is identified as having Combined Systolic and Diastolic heart failure that is Acute on chronic. CHF is currently uncontrolled due to Continued edema of extremities and JVD, Dyspnea not returned to baseline after several doses of IV diuretic, >3 pillow orthopnea, Rales/crackles on pulmonary exam, and Pulmonary edema/pleural effusion on CXR. Latest ECHO performed and demonstrates- Results for orders placed during the hospital encounter of 03/26/23    Echo    Interpretation Summary  · The left ventricle is severely enlarged with eccentric hypertrophy and severely decreased systolic function. The estimated ejection fraction is 15%.  · Normal right ventricular size with moderately reduced right ventricular systolic function.  · Grade II left ventricular diastolic dysfunction.  · Biatrial enlargement.  · Mild mitral regurgitation.  · Small pericardial effusion.  · The estimated PA systolic pressure is 51 mmHg (by tricuspid regurgitation velocity). The estimated mean PA pressure is 39mm Hg.  · Intermediate central venous pressure (8 mmHg).  . Continue ARNI and monitor clinical status closely. Monitor on telemetry. Patient is off CHF pathway.  Monitor strict Is&Os and daily weights.  Place on fluid restriction of 1.5 L. Continue to stress to patient importance of self efficacy and  on diet for  CHF. 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 Michel, her goal is to cont with current therapy and go to [place] to see if she can be a candidate for transplant.    Diuresing well on lasix gtt 10/hr  Cont home  at 5  Will cont Entresto  Strict I&O  Daily wt    CKD (chronic kidney disease) stage 4, GFR 15-29 ml/min  Bl creatinine 2.2-2.6  Currently within bl range    Paroxysmal A-fib  Will cont AC with eliquis  Rate/rhythm control with amiodarone  Close monitoring of HR given patient on dobutamine  Telemetry while admitted  Mag > 2, K > 4    Pulmonary hypertension  Pre and post capillary PH  RHC on 3/29/23 with following findings:  Filling pressures: RA 9, PCWP 16  PA 58/29, mean PA pressure 39 mmHg  PA saturation 45%, Ao saturation 90% on RA  PVR 5.6 BOLDEN, consistent with pre and post-capillary pulmonary hypertension  Gillian CO/CI: 4.1/2.03  SVR: 1151  BP 88/58 , MAP 68, HR 75 SR  Hb 10.7  Jayson 3.2    Will cont diuresis    CKD (chronic kidney disease), stage IV  Patient on admission with creatinine 2.4 from baseline 2.2. Renal function mildly increased during admission to 2.9 on 12/11, likely s/o CRS.     -- avoid nephrotoxic medications  -- renally dose meds  -- maintain MAP > 65  -- daily CMP      Type 2 diabetes mellitus with stage 4 chronic kidney disease, without long-term current use of insulin  -- BGL stable during admission, can add SSI as needed for hyperglycemia    Essential hypertension  -- continue home entresto while admitted    Microcytic anemia  -- hgb on admission at 9 (at baseline)  -- continue home iron supplementation  -- daily CBC, transfuse for Hgb < 7        VTE Risk Mitigation (From admission, onward)           Ordered     apixaban tablet 5 mg  2 times daily         12/08/23 2055     IP VTE HIGH RISK  PATIENT  Once         12/08/23 1945     Place sequential compression device  Until discontinued         12/08/23 1945                    Galo Russ MD  Cardiology  [facility]

## 2023-12-11 NOTE — SUBJECTIVE & OBJECTIVE
Interval History: NAEON, 3L UOP yesterday, NN 2L. Patient reports improvement in sxs, though has continued upper airway congestion. Continuing diuresis.    Review of Systems   Constitutional: Negative for chills, decreased appetite, diaphoresis, fever and night sweats.   HENT: Negative.     Eyes: Negative.    Cardiovascular:  Positive for dyspnea on exertion, leg swelling, orthopnea and paroxysmal nocturnal dyspnea. Negative for chest pain, irregular heartbeat, near-syncope, palpitations and syncope.   Respiratory:  Positive for cough and shortness of breath. Negative for hemoptysis and sputum production.    Skin:  Negative for rash.   Gastrointestinal:  Negative for abdominal pain, change in bowel habit, hematemesis, hematochezia, melena, nausea and vomiting.     Objective:     Vital Signs (Most Recent):  Temp: 97.4 °F (36.3 °C) (12/11/23 0755)  Pulse: 82 (12/11/23 0755)  Resp: 17 (12/11/23 0755)  BP: 119/80 (12/11/23 0755)  SpO2: 97 % (12/11/23 0755) Vital Signs (24h Range):  Temp:  [97.3 °F (36.3 °C)-98.9 °F (37.2 °C)] 97.4 °F (36.3 °C)  Pulse:  [68-89] 82  Resp:  [16-20] 17  SpO2:  [88 %-100 %] 97 %  BP: (100-127)/(59-86) 119/80     Weight: 88.6 kg (195 lb 5.2 oz)  Body mass index is 31.53 kg/m².     SpO2: 97 %         Intake/Output Summary (Last 24 hours) at 12/11/2023 1048  Last data filed at 12/11/2023 0528  Gross per 24 hour   Intake 960 ml   Output 2525 ml   Net -1565 ml         Lines/Drains/Airways       Peripherally Inserted Central Catheter Line  Duration             PICC Double Lumen 05/12/23 1534 right brachial 212 days                       Physical Exam  Vitals reviewed.   Constitutional:       Appearance: Normal appearance. She is obese.   HENT:      Head: Normocephalic and atraumatic.      Nose: Congestion and rhinorrhea present.      Mouth/Throat:      Mouth: Mucous membranes are dry.   Eyes:      General: No scleral icterus.     Extraocular Movements: Extraocular movements intact.       Conjunctiva/sclera: Conjunctivae normal.      Pupils: Pupils are equal, round, and reactive to light.   Cardiovascular:      Rate and Rhythm: Normal rate and regular rhythm.      Pulses: Normal pulses.      Heart sounds: Murmur heard.   Pulmonary:      Effort: Pulmonary effort is normal.   Abdominal:      General: There is no distension.   Musculoskeletal:         General: Swelling present.      Right lower leg: Edema present.      Left lower leg: Edema present.   Skin:     General: Skin is warm.   Neurological:      General: No focal deficit present.      Mental Status: She is alert and oriented to person, place, and time.   Psychiatric:         Mood and Affect: Mood normal.         Behavior: Behavior normal.            Significant Labs: CMP   Recent Labs   Lab 12/10/23  0656 12/10/23  1903 12/11/23  0526    141 141   K 4.0 4.2 4.2    103 106   CO2 24 22* 24   * 134* 103   BUN 72* 84* 85*   CREATININE 2.7* 3.0* 2.9*   CALCIUM 9.0 9.5 8.8   PROT 6.0 6.4 5.9*   ALBUMIN 3.4* 3.5 3.2*   BILITOT 1.1* 0.9 0.8   ALKPHOS 51* 55 50*   AST 20 20 22   ALT 22 23 23   ANIONGAP 11 16 11      and CBC   Recent Labs   Lab 12/11/23  0526   WBC 3.92   HGB 9.1*   HCT 30.2*            Significant Imaging:  All relevant imaging studies have been reviewed.

## 2023-12-11 NOTE — PLAN OF CARE
VSS, on RA/2L NC. Lasix &  gtt infusing per order. I&O monitored  ~2212 had 7 beat run VT. VSS    Problem: Adult Inpatient Plan of Care  Goal: Patient-Specific Goal (Individualized)  Outcome: Ongoing, Progressing     Problem: Fluid Volume Excess  Goal: Fluid Balance  Outcome: Ongoing, Progressing

## 2023-12-12 LAB
ALBUMIN SERPL BCP-MCNC: 3.4 G/DL (ref 3.5–5.2)
ALBUMIN SERPL BCP-MCNC: 3.5 G/DL (ref 3.5–5.2)
ALP SERPL-CCNC: 51 U/L (ref 55–135)
ALP SERPL-CCNC: 57 U/L (ref 55–135)
ALT SERPL W/O P-5'-P-CCNC: 16 U/L (ref 10–44)
ALT SERPL W/O P-5'-P-CCNC: 19 U/L (ref 10–44)
ANION GAP SERPL CALC-SCNC: 13 MMOL/L (ref 8–16)
ANION GAP SERPL CALC-SCNC: 13 MMOL/L (ref 8–16)
ANISOCYTOSIS BLD QL SMEAR: ABNORMAL
AST SERPL-CCNC: 15 U/L (ref 10–40)
AST SERPL-CCNC: 19 U/L (ref 10–40)
BASOPHILS # BLD AUTO: 0.03 K/UL (ref 0–0.2)
BASOPHILS NFR BLD: 0.7 % (ref 0–1.9)
BILIRUB SERPL-MCNC: 1.1 MG/DL (ref 0.1–1)
BILIRUB SERPL-MCNC: 1.3 MG/DL (ref 0.1–1)
BUN SERPL-MCNC: 82 MG/DL (ref 6–20)
BUN SERPL-MCNC: 85 MG/DL (ref 6–20)
CALCIUM SERPL-MCNC: 8.9 MG/DL (ref 8.7–10.5)
CALCIUM SERPL-MCNC: 9.2 MG/DL (ref 8.7–10.5)
CHLORIDE SERPL-SCNC: 102 MMOL/L (ref 95–110)
CHLORIDE SERPL-SCNC: 103 MMOL/L (ref 95–110)
CO2 SERPL-SCNC: 26 MMOL/L (ref 23–29)
CO2 SERPL-SCNC: 28 MMOL/L (ref 23–29)
CREAT SERPL-MCNC: 2.6 MG/DL (ref 0.5–1.4)
CREAT SERPL-MCNC: 2.7 MG/DL (ref 0.5–1.4)
DACRYOCYTES BLD QL SMEAR: ABNORMAL
DIFFERENTIAL METHOD: ABNORMAL
EOSINOPHIL # BLD AUTO: 0.1 K/UL (ref 0–0.5)
EOSINOPHIL NFR BLD: 2.7 % (ref 0–8)
ERYTHROCYTE [DISTWIDTH] IN BLOOD BY AUTOMATED COUNT: 33.3 % (ref 11.5–14.5)
EST. GFR  (NO RACE VARIABLE): 19.8 ML/MIN/1.73 M^2
EST. GFR  (NO RACE VARIABLE): 20.7 ML/MIN/1.73 M^2
GLUCOSE SERPL-MCNC: 101 MG/DL (ref 70–110)
GLUCOSE SERPL-MCNC: 147 MG/DL (ref 70–110)
HCT VFR BLD AUTO: 32.4 % (ref 37–48.5)
HGB BLD-MCNC: 9.6 G/DL (ref 12–16)
HYPOCHROMIA BLD QL SMEAR: ABNORMAL
IMM GRANULOCYTES # BLD AUTO: 0.02 K/UL (ref 0–0.04)
IMM GRANULOCYTES NFR BLD AUTO: 0.5 % (ref 0–0.5)
LYMPHOCYTES # BLD AUTO: 0.8 K/UL (ref 1–4.8)
LYMPHOCYTES NFR BLD: 20 % (ref 18–48)
MAGNESIUM SERPL-MCNC: 2.3 MG/DL (ref 1.6–2.6)
MAGNESIUM SERPL-MCNC: 2.3 MG/DL (ref 1.6–2.6)
MCH RBC QN AUTO: 19.2 PG (ref 27–31)
MCHC RBC AUTO-ENTMCNC: 29.6 G/DL (ref 32–36)
MCV RBC AUTO: 65 FL (ref 82–98)
MONOCYTES # BLD AUTO: 0.4 K/UL (ref 0.3–1)
MONOCYTES NFR BLD: 9.2 % (ref 4–15)
NEUTROPHILS # BLD AUTO: 2.8 K/UL (ref 1.8–7.7)
NEUTROPHILS NFR BLD: 66.9 % (ref 38–73)
NRBC BLD-RTO: 2 /100 WBC
OVALOCYTES BLD QL SMEAR: ABNORMAL
PLATELET # BLD AUTO: 181 K/UL (ref 150–450)
PLATELET BLD QL SMEAR: ABNORMAL
PMV BLD AUTO: ABNORMAL FL (ref 9.2–12.9)
POIKILOCYTOSIS BLD QL SMEAR: ABNORMAL
POLYCHROMASIA BLD QL SMEAR: ABNORMAL
POTASSIUM SERPL-SCNC: 3.8 MMOL/L (ref 3.5–5.1)
POTASSIUM SERPL-SCNC: 4.5 MMOL/L (ref 3.5–5.1)
PROT SERPL-MCNC: 6.1 G/DL (ref 6–8.4)
PROT SERPL-MCNC: 6.5 G/DL (ref 6–8.4)
RBC # BLD AUTO: 5 M/UL (ref 4–5.4)
SCHISTOCYTES BLD QL SMEAR: ABNORMAL
SCHISTOCYTES BLD QL SMEAR: PRESENT
SODIUM SERPL-SCNC: 142 MMOL/L (ref 136–145)
SODIUM SERPL-SCNC: 143 MMOL/L (ref 136–145)
SPHEROCYTES BLD QL SMEAR: ABNORMAL
TARGETS BLD QL SMEAR: ABNORMAL
WBC # BLD AUTO: 4.11 K/UL (ref 3.9–12.7)

## 2023-12-12 PROCEDURE — 99231 PR SUBSEQUENT HOSPITAL CARE,LEVL I: ICD-10-PCS | Mod: ,,, | Performed by: INTERNAL MEDICINE

## 2023-12-12 PROCEDURE — 20600001 HC STEP DOWN PRIVATE ROOM

## 2023-12-12 PROCEDURE — 85025 COMPLETE CBC W/AUTO DIFF WBC: CPT

## 2023-12-12 PROCEDURE — 99231 SBSQ HOSP IP/OBS SF/LOW 25: CPT | Mod: ,,, | Performed by: INTERNAL MEDICINE

## 2023-12-12 PROCEDURE — 25000003 PHARM REV CODE 250

## 2023-12-12 PROCEDURE — 25000003 PHARM REV CODE 250: Performed by: STUDENT IN AN ORGANIZED HEALTH CARE EDUCATION/TRAINING PROGRAM

## 2023-12-12 PROCEDURE — 83735 ASSAY OF MAGNESIUM: CPT | Mod: 91

## 2023-12-12 PROCEDURE — 36415 COLL VENOUS BLD VENIPUNCTURE: CPT

## 2023-12-12 PROCEDURE — 80053 COMPREHEN METABOLIC PANEL: CPT | Mod: 91

## 2023-12-12 RX ORDER — METOLAZONE 5 MG/1
5 TABLET ORAL DAILY
Status: DISCONTINUED | OUTPATIENT
Start: 2023-12-12 | End: 2023-12-15 | Stop reason: HOSPADM

## 2023-12-12 RX ORDER — POTASSIUM CHLORIDE 750 MG/1
30 CAPSULE, EXTENDED RELEASE ORAL ONCE
Status: COMPLETED | OUTPATIENT
Start: 2023-12-12 | End: 2023-12-12

## 2023-12-12 RX ADMIN — GUAIFENESIN 200 MG: 200 SOLUTION ORAL at 09:12

## 2023-12-12 RX ADMIN — SACUBITRIL AND VALSARTAN 1 TABLET: 49; 51 TABLET, FILM COATED ORAL at 09:12

## 2023-12-12 RX ADMIN — ALLOPURINOL 300 MG: 300 TABLET ORAL at 08:12

## 2023-12-12 RX ADMIN — APIXABAN 5 MG: 5 TABLET, FILM COATED ORAL at 08:12

## 2023-12-12 RX ADMIN — POTASSIUM CHLORIDE 30 MEQ: 10 CAPSULE, COATED, EXTENDED RELEASE ORAL at 09:12

## 2023-12-12 RX ADMIN — ATORVASTATIN CALCIUM 40 MG: 40 TABLET, FILM COATED ORAL at 09:12

## 2023-12-12 RX ADMIN — AMIODARONE HYDROCHLORIDE 200 MG: 200 TABLET ORAL at 08:12

## 2023-12-12 RX ADMIN — METOLAZONE 5 MG: 5 TABLET ORAL at 02:12

## 2023-12-12 RX ADMIN — SACUBITRIL AND VALSARTAN 1 TABLET: 49; 51 TABLET, FILM COATED ORAL at 08:12

## 2023-12-12 RX ADMIN — APIXABAN 5 MG: 5 TABLET, FILM COATED ORAL at 09:12

## 2023-12-12 NOTE — PROGRESS NOTES
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 ED for ADHF. She initially presented to the OSH for worsening SOB.   on arrival to [facility] ED labs were signficant for cr 2.4 (bl  ), BNP was noted to be 3000, Trop 1.036, procal 0.35.  on arrival to [facility] she was found to be hemodynamically stable. Denies cp/chest discomfort but does report sob/iniguez, pnd.     TTE on 3/27/23 with EF 15%, PASP 51 mmhg         Hospital Course:   Patient admitted to CCU for management of acute decompensated end-stage HF. Patient continued on home  dose of 5. Started on lasix gtt and diuresing well. Continuing home entresto while admitted. Stopped BB given patient on dobutamine. Patient continues to diurese well, NN 2 liters each day. Added metolazone on 12/12.    Interval History: NAEON, 2.4L UOP, NN 1.2L. Starting metolazone today.     Review of Systems   Constitutional: Negative for chills, decreased appetite, diaphoresis, fever and night sweats.   HENT: Negative.     Eyes: Negative.    Cardiovascular:  Positive for dyspnea on exertion, leg swelling, orthopnea and paroxysmal nocturnal dyspnea. Negative for chest pain, irregular heartbeat, near-syncope, palpitations and syncope.   Respiratory:  Positive for cough and shortness of breath. Negative for hemoptysis and sputum production.    Skin:  Negative for rash.   Gastrointestinal:  Negative for abdominal pain, change in bowel habit, hematemesis, hematochezia, melena, nausea and  vomiting.     Objective:     Vital Signs (Most Recent):  Temp: 98.4 °F (36.9 °C) (12/12/23 1226)  Pulse: 90 (12/12/23 1226)  Resp: 18 (12/12/23 1226)  BP: (!) 131/55 (12/12/23 1428)  SpO2: 98 % (12/12/23 1226) Vital Signs (24h Range):  Temp:  [97 °F (36.1 °C)-98.4 °F (36.9 °C)] 98.4 °F (36.9 °C)  Pulse:  [79-90] 90  Resp:  [18-19] 18  SpO2:  [97 %-99 %] 98 %  BP: (113-143)/(55-84) 131/55     Weight: 88.6 kg (195 lb 5.2 oz)  Body mass index is 31.53 kg/m².     SpO2: 98 %         Intake/Output Summary (Last 24 hours) at 12/12/2023 1440  Last data filed at 12/12/2023 0600  Gross per 24 hour   Intake 462 ml   Output 2400 ml   Net -1938 ml         Lines/Drains/Airways       Peripherally Inserted Central Catheter Line  Duration             PICC Double Lumen 05/12/23 1534 right brachial 214 days                       Physical Exam  Vitals reviewed.   Constitutional:       Appearance: Normal appearance. She is obese.   HENT:      Head: Normocephalic and atraumatic.      Nose: Congestion and rhinorrhea present.      Mouth/Throat:      Mouth: Mucous membranes are dry.   Eyes:      General: No scleral icterus.     Extraocular Movements: Extraocular movements intact.      Conjunctiva/sclera: Conjunctivae normal.      Pupils: Pupils are equal, round, and reactive to light.   Cardiovascular:      Rate and Rhythm: Normal rate and regular rhythm.      Pulses: Normal pulses.      Heart sounds: Murmur heard.   Pulmonary:      Effort: Pulmonary effort is normal.   Abdominal:      General: There is no distension.   Musculoskeletal:         General: Swelling present.      Right lower leg: Edema present.      Left lower leg: Edema present.   Skin:     General: Skin is warm.   Neurological:      General: No focal deficit present.      Mental Status: She is alert and oriented to person, place, and time.   Psychiatric:         Mood and Affect: Mood normal.         Behavior: Behavior normal.            Significant Labs: CMP   Recent Labs    Lab 12/11/23  0526 12/11/23  1844 12/12/23  0304    142 142   K 4.2 4.7 3.8    104 103   CO2 24 24 26    133* 101   BUN 85* 88* 85*   CREATININE 2.9* 2.8* 2.6*   CALCIUM 8.8 9.2 8.9   PROT 5.9* 6.6 6.1   ALBUMIN 3.2* 3.6 3.4*   BILITOT 0.8 0.9 1.1*   ALKPHOS 50* 58 51*   AST 22 17 15   ALT 23 21 16   ANIONGAP 11 14 13      and CBC   Recent Labs   Lab 12/11/23  0526 12/12/23  0304   WBC 3.92 4.11   HGB 9.1* 9.6*   HCT 30.2* 32.4*    181         Significant Imaging:  All relevant imaging studies have been reviewed.  Assessment and Plan:       * Acute decompensated heart failure  Patient is identified as having Combined Systolic and Diastolic heart failure that is Acute on chronic. CHF is currently uncontrolled due to Continued edema of extremities and JVD, Dyspnea not returned to baseline after several doses of IV diuretic, >3 pillow orthopnea, Rales/crackles on pulmonary exam, and Pulmonary edema/pleural effusion on CXR. Latest ECHO performed and demonstrates- Results for orders placed during the hospital encounter of 03/26/23    Echo    Interpretation Summary  · The left ventricle is severely enlarged with eccentric hypertrophy and severely decreased systolic function. The estimated ejection fraction is 15%.  · Normal right ventricular size with moderately reduced right ventricular systolic function.  · Grade II left ventricular diastolic dysfunction.  · Biatrial enlargement.  · Mild mitral regurgitation.  · Small pericardial effusion.  · The estimated PA systolic pressure is 51 mmHg (by tricuspid regurgitation velocity). The estimated mean PA pressure is 39mm Hg.  · Intermediate central venous pressure (8 mmHg).  . Continue ARNI and monitor clinical status closely. Monitor on telemetry. Patient is off CHF pathway.  Monitor strict Is&Os and daily weights.  Place on fluid restriction of 1.5 L. Continue to stress to patient importance of self efficacy and  on diet for CHF. Last BNP  reviewed- and noted below   Recent Labs   Lab 12/11/23  0848   BNP 3,334*       ADHF:  2/2 niCM (EF 10%)  Stage D, NYHA   not a candidate for advanced options  on home palliative   pt sees outpatient palliative medicine, Dr. Michel, her goal is to cont with current therapy and go to [place] to see if she can be a candidate for transplant.    Diuresing well on lasix gtt 10/hr, adding Metolazone on 12/12  Cont home  at 5  Will cont Entresto  Strict I&O  Daily wt    CKD (chronic kidney disease) stage 4, GFR 15-29 ml/min  Bl creatinine 2.2-2.6  Currently within bl range    Paroxysmal A-fib  Will cont AC with eliquis  Rate/rhythm control with amiodarone  Close monitoring of HR given patient on dobutamine  Telemetry while admitted  Mag > 2, K > 4    Pulmonary hypertension  Pre and post capillary PH  RHC on 3/29/23 with following findings:  Filling pressures: RA 9, PCWP 16  PA 58/29, mean PA pressure 39 mmHg  PA saturation 45%, Ao saturation 90% on RA  PVR 5.6 BOLDEN, consistent with pre and post-capillary pulmonary hypertension  Gillian CO/CI: 4.1/2.03  SVR: 1151  BP 88/58 , MAP 68, HR 75 SR  Hb 10.7  Jayson 3.2    Will cont diuresis    CKD (chronic kidney disease), stage IV  Patient on admission with creatinine 2.4 from baseline 2.2. Renal function mildly increased during admission to 2.6 on 12/11, likely s/o CRS.     -- avoid nephrotoxic medications  -- renally dose meds  -- maintain MAP > 65  -- daily CMP      Type 2 diabetes mellitus with stage 4 chronic kidney disease, without long-term current use of insulin  -- BGL stable during admission, can add SSI as needed for hyperglycemia    Essential hypertension  -- continue home entresto while admitted    Microcytic anemia  -- hgb on admission at 9 (at baseline)  -- continue home iron supplementation  -- daily CBC, transfuse for Hgb < 7        VTE Risk Mitigation (From admission, onward)           Ordered     apixaban tablet 5 mg  2 times daily         12/08/23 2055     IP VTE  HIGH RISK PATIENT  Once         12/08/23 1945     Place sequential compression device  Until discontinued         12/08/23 1945                    Galo Russ MD  Cardiology  [facility]

## 2023-12-12 NOTE — NURSING
Nurses Note -- 4 Eyes      12/12/2023   11:28 AM      Skin assessed during: Daily Assessment      [x] No Altered Skin Integrity Present    []Prevention Measures Documented      [] Yes- Altered Skin Integrity Present or Discovered   [] LDA Added if Not in Epic (Describe Wound)   [] New Altered Skin Integrity was Present on Admit and Documented in LDA   [] Wound Image Taken    Wound Care Consulted? No    Attending Nurse:  HARISH Ac RN/Staff Member:  CHANTELL Haines

## 2023-12-12 NOTE — PLAN OF CARE
Problem: Adult Inpatient Plan of Care  Goal: Plan of Care Review  Outcome: Ongoing, Progressing  Goal: Patient-Specific Goal (Individualized)  Outcome: Ongoing, Progressing  Goal: Absence of Hospital-Acquired Illness or Injury  Outcome: Ongoing, Progressing  Goal: Optimal Comfort and Wellbeing  Outcome: Ongoing, Progressing  Goal: Readiness for Transition of Care  Outcome: Ongoing, Progressing     Problem: Diabetes Comorbidity  Goal: Blood Glucose Level Within Targeted Range  Outcome: Ongoing, Progressing     Problem: Infection  Goal: Absence of Infection Signs and Symptoms  Outcome: Ongoing, Progressing     Problem: Fluid Volume Excess  Goal: Fluid Balance  Outcome: Ongoing, Progressing     Problem: Fall Injury Risk  Goal: Absence of Fall and Fall-Related Injury  Outcome: Ongoing, Progressing   Plan of care discussed with patient. Patient is free of fall/trauma/injury. Denies CP, SOB, or pain/discomfort. All questions addressed. Will continue to monitor. AAOx4 and VSS. Lasix gtt at 10mg/hr. Dobutamine gtt at 5mcg/hr.

## 2023-12-12 NOTE — SUBJECTIVE & OBJECTIVE
Interval History: NAEON, 2.4L UOP, NN 1.2L. Starting metolazone today.     Review of Systems   Constitutional: Negative for chills, decreased appetite, diaphoresis, fever and night sweats.   HENT: Negative.     Eyes: Negative.    Cardiovascular:  Positive for dyspnea on exertion, leg swelling, orthopnea and paroxysmal nocturnal dyspnea. Negative for chest pain, irregular heartbeat, near-syncope, palpitations and syncope.   Respiratory:  Positive for cough and shortness of breath. Negative for hemoptysis and sputum production.    Skin:  Negative for rash.   Gastrointestinal:  Negative for abdominal pain, change in bowel habit, hematemesis, hematochezia, melena, nausea and vomiting.     Objective:     Vital Signs (Most Recent):  Temp: 98.4 °F (36.9 °C) (12/12/23 1226)  Pulse: 90 (12/12/23 1226)  Resp: 18 (12/12/23 1226)  BP: (!) 131/55 (12/12/23 1428)  SpO2: 98 % (12/12/23 1226) Vital Signs (24h Range):  Temp:  [97 °F (36.1 °C)-98.4 °F (36.9 °C)] 98.4 °F (36.9 °C)  Pulse:  [79-90] 90  Resp:  [18-19] 18  SpO2:  [97 %-99 %] 98 %  BP: (113-143)/(55-84) 131/55     Weight: 88.6 kg (195 lb 5.2 oz)  Body mass index is 31.53 kg/m².     SpO2: 98 %         Intake/Output Summary (Last 24 hours) at 12/12/2023 1440  Last data filed at 12/12/2023 0600  Gross per 24 hour   Intake 462 ml   Output 2400 ml   Net -1938 ml         Lines/Drains/Airways       Peripherally Inserted Central Catheter Line  Duration             PICC Double Lumen 05/12/23 1534 right brachial 214 days                       Physical Exam  Vitals reviewed.   Constitutional:       Appearance: Normal appearance. She is obese.   HENT:      Head: Normocephalic and atraumatic.      Nose: Congestion and rhinorrhea present.      Mouth/Throat:      Mouth: Mucous membranes are dry.   Eyes:      General: No scleral icterus.     Extraocular Movements: Extraocular movements intact.      Conjunctiva/sclera: Conjunctivae normal.      Pupils: Pupils are equal, round, and  reactive to light.   Cardiovascular:      Rate and Rhythm: Normal rate and regular rhythm.      Pulses: Normal pulses.      Heart sounds: Murmur heard.   Pulmonary:      Effort: Pulmonary effort is normal.   Abdominal:      General: There is no distension.   Musculoskeletal:         General: Swelling present.      Right lower leg: Edema present.      Left lower leg: Edema present.   Skin:     General: Skin is warm.   Neurological:      General: No focal deficit present.      Mental Status: She is alert and oriented to person, place, and time.   Psychiatric:         Mood and Affect: Mood normal.         Behavior: Behavior normal.            Significant Labs: CMP   Recent Labs   Lab 12/11/23  0526 12/11/23  1844 12/12/23  0304    142 142   K 4.2 4.7 3.8    104 103   CO2 24 24 26    133* 101   BUN 85* 88* 85*   CREATININE 2.9* 2.8* 2.6*   CALCIUM 8.8 9.2 8.9   PROT 5.9* 6.6 6.1   ALBUMIN 3.2* 3.6 3.4*   BILITOT 0.8 0.9 1.1*   ALKPHOS 50* 58 51*   AST 22 17 15   ALT 23 21 16   ANIONGAP 11 14 13      and CBC   Recent Labs   Lab 12/11/23  0526 12/12/23  0304   WBC 3.92 4.11   HGB 9.1* 9.6*   HCT 30.2* 32.4*    181         Significant Imaging:  All relevant imaging studies have been reviewed.

## 2023-12-13 LAB
ALBUMIN SERPL BCP-MCNC: 3.6 G/DL (ref 3.5–5.2)
ALBUMIN SERPL BCP-MCNC: 4 G/DL (ref 3.5–5.2)
ALP SERPL-CCNC: 56 U/L (ref 55–135)
ALP SERPL-CCNC: 70 U/L (ref 55–135)
ALT SERPL W/O P-5'-P-CCNC: 18 U/L (ref 10–44)
ALT SERPL W/O P-5'-P-CCNC: 20 U/L (ref 10–44)
ANION GAP SERPL CALC-SCNC: 13 MMOL/L (ref 8–16)
ANION GAP SERPL CALC-SCNC: 14 MMOL/L (ref 8–16)
AST SERPL-CCNC: 18 U/L (ref 10–40)
AST SERPL-CCNC: 21 U/L (ref 10–40)
BACTERIA BLD CULT: NORMAL
BACTERIA BLD CULT: NORMAL
BASOPHILS # BLD AUTO: 0.02 K/UL (ref 0–0.2)
BASOPHILS NFR BLD: 0.5 % (ref 0–1.9)
BILIRUB SERPL-MCNC: 1.6 MG/DL (ref 0.1–1)
BILIRUB SERPL-MCNC: 2 MG/DL (ref 0.1–1)
BNP SERPL-MCNC: 2605 PG/ML (ref 0–99)
BUN SERPL-MCNC: 81 MG/DL (ref 6–20)
BUN SERPL-MCNC: 85 MG/DL (ref 6–20)
CALCIUM SERPL-MCNC: 10 MG/DL (ref 8.7–10.5)
CALCIUM SERPL-MCNC: 9.6 MG/DL (ref 8.7–10.5)
CHLORIDE SERPL-SCNC: 102 MMOL/L (ref 95–110)
CHLORIDE SERPL-SCNC: 99 MMOL/L (ref 95–110)
CO2 SERPL-SCNC: 26 MMOL/L (ref 23–29)
CO2 SERPL-SCNC: 27 MMOL/L (ref 23–29)
CREAT SERPL-MCNC: 2.3 MG/DL (ref 0.5–1.4)
CREAT SERPL-MCNC: 2.6 MG/DL (ref 0.5–1.4)
DIFFERENTIAL METHOD: ABNORMAL
EOSINOPHIL # BLD AUTO: 0.1 K/UL (ref 0–0.5)
EOSINOPHIL NFR BLD: 2.6 % (ref 0–8)
ERYTHROCYTE [DISTWIDTH] IN BLOOD BY AUTOMATED COUNT: 35 % (ref 11.5–14.5)
EST. GFR  (NO RACE VARIABLE): 20.7 ML/MIN/1.73 M^2
EST. GFR  (NO RACE VARIABLE): 24 ML/MIN/1.73 M^2
GLUCOSE SERPL-MCNC: 143 MG/DL (ref 70–110)
GLUCOSE SERPL-MCNC: 96 MG/DL (ref 70–110)
HCT VFR BLD AUTO: 35.8 % (ref 37–48.5)
HGB BLD-MCNC: 10.5 G/DL (ref 12–16)
IMM GRANULOCYTES # BLD AUTO: 0.02 K/UL (ref 0–0.04)
IMM GRANULOCYTES NFR BLD AUTO: 0.5 % (ref 0–0.5)
LYMPHOCYTES # BLD AUTO: 0.8 K/UL (ref 1–4.8)
LYMPHOCYTES NFR BLD: 20.8 % (ref 18–48)
MAGNESIUM SERPL-MCNC: 2.4 MG/DL (ref 1.6–2.6)
MAGNESIUM SERPL-MCNC: 2.6 MG/DL (ref 1.6–2.6)
MCH RBC QN AUTO: 18.2 PG (ref 27–31)
MCHC RBC AUTO-ENTMCNC: 29.3 G/DL (ref 32–36)
MCV RBC AUTO: 62 FL (ref 82–98)
MONOCYTES # BLD AUTO: 0.4 K/UL (ref 0.3–1)
MONOCYTES NFR BLD: 10.4 % (ref 4–15)
NEUTROPHILS # BLD AUTO: 2.5 K/UL (ref 1.8–7.7)
NEUTROPHILS NFR BLD: 65.2 % (ref 38–73)
NRBC BLD-RTO: 1 /100 WBC
PLATELET # BLD AUTO: 174 K/UL (ref 150–450)
PMV BLD AUTO: ABNORMAL FL (ref 9.2–12.9)
POTASSIUM SERPL-SCNC: 3.8 MMOL/L (ref 3.5–5.1)
POTASSIUM SERPL-SCNC: 4.3 MMOL/L (ref 3.5–5.1)
PROT SERPL-MCNC: 6.6 G/DL (ref 6–8.4)
PROT SERPL-MCNC: 7.4 G/DL (ref 6–8.4)
RBC # BLD AUTO: 5.76 M/UL (ref 4–5.4)
SODIUM SERPL-SCNC: 140 MMOL/L (ref 136–145)
SODIUM SERPL-SCNC: 141 MMOL/L (ref 136–145)
WBC # BLD AUTO: 3.84 K/UL (ref 3.9–12.7)

## 2023-12-13 PROCEDURE — 36415 COLL VENOUS BLD VENIPUNCTURE: CPT

## 2023-12-13 PROCEDURE — 25000003 PHARM REV CODE 250: Performed by: STUDENT IN AN ORGANIZED HEALTH CARE EDUCATION/TRAINING PROGRAM

## 2023-12-13 PROCEDURE — 25000003 PHARM REV CODE 250

## 2023-12-13 PROCEDURE — 99231 SBSQ HOSP IP/OBS SF/LOW 25: CPT | Mod: ,,, | Performed by: INTERNAL MEDICINE

## 2023-12-13 PROCEDURE — 80053 COMPREHEN METABOLIC PANEL: CPT | Mod: 91 | Performed by: INTERNAL MEDICINE

## 2023-12-13 PROCEDURE — 83735 ASSAY OF MAGNESIUM: CPT

## 2023-12-13 PROCEDURE — 63600175 PHARM REV CODE 636 W HCPCS: Performed by: STUDENT IN AN ORGANIZED HEALTH CARE EDUCATION/TRAINING PROGRAM

## 2023-12-13 PROCEDURE — 83735 ASSAY OF MAGNESIUM: CPT | Mod: 91 | Performed by: INTERNAL MEDICINE

## 2023-12-13 PROCEDURE — 20600001 HC STEP DOWN PRIVATE ROOM

## 2023-12-13 PROCEDURE — 36415 COLL VENOUS BLD VENIPUNCTURE: CPT | Performed by: STUDENT IN AN ORGANIZED HEALTH CARE EDUCATION/TRAINING PROGRAM

## 2023-12-13 PROCEDURE — 94761 N-INVAS EAR/PLS OXIMETRY MLT: CPT

## 2023-12-13 PROCEDURE — 83880 ASSAY OF NATRIURETIC PEPTIDE: CPT | Performed by: STUDENT IN AN ORGANIZED HEALTH CARE EDUCATION/TRAINING PROGRAM

## 2023-12-13 PROCEDURE — 25000003 PHARM REV CODE 250: Performed by: INTERNAL MEDICINE

## 2023-12-13 PROCEDURE — 97530 THERAPEUTIC ACTIVITIES: CPT

## 2023-12-13 PROCEDURE — 85025 COMPLETE CBC W/AUTO DIFF WBC: CPT

## 2023-12-13 PROCEDURE — 99231 PR SUBSEQUENT HOSPITAL CARE,LEVL I: ICD-10-PCS | Mod: ,,, | Performed by: INTERNAL MEDICINE

## 2023-12-13 PROCEDURE — 80053 COMPREHEN METABOLIC PANEL: CPT

## 2023-12-13 PROCEDURE — 97165 OT EVAL LOW COMPLEX 30 MIN: CPT

## 2023-12-13 PROCEDURE — 36415 COLL VENOUS BLD VENIPUNCTURE: CPT | Performed by: INTERNAL MEDICINE

## 2023-12-13 RX ORDER — POTASSIUM CHLORIDE 750 MG/1
30 CAPSULE, EXTENDED RELEASE ORAL ONCE
Status: COMPLETED | OUTPATIENT
Start: 2023-12-13 | End: 2023-12-13

## 2023-12-13 RX ORDER — POTASSIUM CHLORIDE 20 MEQ/1
40 TABLET, EXTENDED RELEASE ORAL ONCE
Status: COMPLETED | OUTPATIENT
Start: 2023-12-13 | End: 2023-12-13

## 2023-12-13 RX ADMIN — DOBUTAMINE IN DEXTROSE 5 MCG/KG/MIN: 400 INJECTION, SOLUTION INTRAVENOUS at 06:12

## 2023-12-13 RX ADMIN — SACUBITRIL AND VALSARTAN 1 TABLET: 49; 51 TABLET, FILM COATED ORAL at 08:12

## 2023-12-13 RX ADMIN — POTASSIUM CHLORIDE 40 MEQ: 1500 TABLET, EXTENDED RELEASE ORAL at 08:12

## 2023-12-13 RX ADMIN — APIXABAN 5 MG: 5 TABLET, FILM COATED ORAL at 08:12

## 2023-12-13 RX ADMIN — ATORVASTATIN CALCIUM 40 MG: 40 TABLET, FILM COATED ORAL at 08:12

## 2023-12-13 RX ADMIN — FUROSEMIDE 10 MG/HR: 10 INJECTION, SOLUTION INTRAMUSCULAR; INTRAVENOUS at 03:12

## 2023-12-13 RX ADMIN — POTASSIUM CHLORIDE 30 MEQ: 10 CAPSULE, COATED, EXTENDED RELEASE ORAL at 08:12

## 2023-12-13 RX ADMIN — BENZONATATE 100 MG: 100 CAPSULE ORAL at 08:12

## 2023-12-13 RX ADMIN — ALLOPURINOL 300 MG: 300 TABLET ORAL at 08:12

## 2023-12-13 RX ADMIN — AMIODARONE HYDROCHLORIDE 200 MG: 200 TABLET ORAL at 08:12

## 2023-12-13 RX ADMIN — METOLAZONE 5 MG: 5 TABLET ORAL at 08:12

## 2023-12-13 NOTE — SUBJECTIVE & OBJECTIVE
Interval History: NAEON, 4.3L UOP, NN 3.1L yesterday on metolazone and lasix gtt. Sxs improved today, congestion improved.     Review of Systems   Constitutional: Negative for chills, decreased appetite, diaphoresis, fever and night sweats.   HENT: Negative.     Eyes: Negative.    Cardiovascular:  Positive for dyspnea on exertion, leg swelling, orthopnea and paroxysmal nocturnal dyspnea. Negative for chest pain, irregular heartbeat, near-syncope, palpitations and syncope.   Respiratory:  Positive for cough and shortness of breath. Negative for hemoptysis and sputum production.    Skin:  Negative for rash.   Gastrointestinal:  Negative for abdominal pain, change in bowel habit, hematemesis, hematochezia, melena, nausea and vomiting.     Objective:     Vital Signs (Most Recent):  Temp: 97.9 °F (36.6 °C) (12/13/23 0745)  Pulse: 87 (12/13/23 0745)  Resp: 20 (12/13/23 0745)  BP: 121/75 (12/13/23 0745)  SpO2: 99 % (12/13/23 0745) Vital Signs (24h Range):  Temp:  [97.5 °F (36.4 °C)-98.4 °F (36.9 °C)] 97.9 °F (36.6 °C)  Pulse:  [] 87  Resp:  [18-20] 20  SpO2:  [93 %-99 %] 99 %  BP: (114-131)/(55-86) 121/75     Weight: 88.6 kg (195 lb 5.2 oz)  Body mass index is 31.53 kg/m².     SpO2: 99 %         Intake/Output Summary (Last 24 hours) at 12/13/2023 0907  Last data filed at 12/13/2023 0814  Gross per 24 hour   Intake 1662 ml   Output 4800 ml   Net -3138 ml         Lines/Drains/Airways       Peripherally Inserted Central Catheter Line  Duration             PICC Double Lumen 05/12/23 1534 right brachial 214 days                       Physical Exam  Vitals reviewed.   Constitutional:       Appearance: Normal appearance. She is obese.   HENT:      Head: Normocephalic and atraumatic.      Nose: Congestion and rhinorrhea present.      Mouth/Throat:      Mouth: Mucous membranes are dry.   Eyes:      General: No scleral icterus.     Extraocular Movements: Extraocular movements intact.      Conjunctiva/sclera: Conjunctivae  normal.      Pupils: Pupils are equal, round, and reactive to light.   Cardiovascular:      Rate and Rhythm: Normal rate and regular rhythm.      Pulses: Normal pulses.      Heart sounds: Murmur heard.   Pulmonary:      Effort: Pulmonary effort is normal.   Abdominal:      General: There is no distension.   Musculoskeletal:         General: Swelling present.      Right lower leg: Edema (trace pitting, improved) present.      Left lower leg: Edema (trace pitting, improved) present.   Skin:     General: Skin is warm.   Neurological:      General: No focal deficit present.      Mental Status: She is alert and oriented to person, place, and time.   Psychiatric:         Mood and Affect: Mood normal.         Behavior: Behavior normal.            Significant Labs: CMP   Recent Labs   Lab 12/12/23  0304 12/12/23  1910 12/13/23  0440    143 141   K 3.8 4.5 3.8    102 102   CO2 26 28 26    147* 96   BUN 85* 82* 81*   CREATININE 2.6* 2.7* 2.3*   CALCIUM 8.9 9.2 9.6   PROT 6.1 6.5 6.6   ALBUMIN 3.4* 3.5 3.6   BILITOT 1.1* 1.3* 2.0*   ALKPHOS 51* 57 56   AST 15 19 18   ALT 16 19 18   ANIONGAP 13 13 13      and CBC   Recent Labs   Lab 12/12/23 0304 12/13/23  0440   WBC 4.11 3.84*   HGB 9.6* 10.5*   HCT 32.4* 35.8*    174         Significant Imaging:  All relevant imaging studies have been reviewed.

## 2023-12-13 NOTE — ASSESSMENT & PLAN NOTE
-- BGL stable during admission, can add SSI as needed for hyperglycemia  
-- continue home entresto while admitted  
-- continue home entresto, BB while admitted  
-- hgb on admission at 9 (at baseline)  -- continue home iron supplementation  -- daily CBC, transfuse for Hgb < 7  
-- hgb on admission at 9 (at baseline). Stable during hospitalization  -- continue home iron supplementation  -- daily CBC, transfuse for Hgb < 7  
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 Michel, her goal is to cont with current therapy and go to [place] to see if she can be a candidate for transplant.    presented to the hospital with decompensated heart failure    Will initiate diuresis with lasix gtt- will adjust as needed  Cont home   Will cont GDMT as tolerated ( given cr is within bl can cont entersto as well)  Strict I&O  Daily wt  TTE in AM  
Bl creatinine 2.2-2.6  Currently within bl range  
Patient is identified as having Combined Systolic and Diastolic heart failure that is Acute on chronic. CHF is currently uncontrolled due to Continued edema of extremities and JVD, Dyspnea not returned to baseline after several doses of IV diuretic, >3 pillow orthopnea, Rales/crackles on pulmonary exam, and Pulmonary edema/pleural effusion on CXR. Latest ECHO performed and demonstrates- Results for orders placed during the hospital encounter of 03/26/23    Echo    Interpretation Summary  · The left ventricle is severely enlarged with eccentric hypertrophy and severely decreased systolic function. The estimated ejection fraction is 15%.  · Normal right ventricular size with moderately reduced right ventricular systolic function.  · Grade II left ventricular diastolic dysfunction.  · Biatrial enlargement.  · Mild mitral regurgitation.  · Small pericardial effusion.  · The estimated PA systolic pressure is 51 mmHg (by tricuspid regurgitation velocity). The estimated mean PA pressure is 39mm Hg.  · Intermediate central venous pressure (8 mmHg).  . Continue ARNI and monitor clinical status closely. Monitor on telemetry. Patient is off CHF pathway.  Monitor strict Is&Os and daily weights.  Place on fluid restriction of 1.5 L. Continue to stress to patient importance of self efficacy and  on diet for CHF. 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 Michel, her goal is to cont with current therapy and go to [place] to see if she can be a candidate for transplant.    Diuresing well on lasix gtt 10/hr  Cont home  at 5  Will cont Entresto  Strict I&O  Daily wt  
Patient is identified as having Combined Systolic and Diastolic heart failure that is Acute on chronic. CHF is currently uncontrolled due to Continued edema of extremities and JVD, Dyspnea not returned to baseline after several doses of IV diuretic, >3 pillow orthopnea, Rales/crackles on pulmonary exam, and Pulmonary edema/pleural effusion on CXR. Latest ECHO performed and demonstrates- Results for orders placed during the hospital encounter of 03/26/23    Echo    Interpretation Summary  · The left ventricle is severely enlarged with eccentric hypertrophy and severely decreased systolic function. The estimated ejection fraction is 15%.  · Normal right ventricular size with moderately reduced right ventricular systolic function.  · Grade II left ventricular diastolic dysfunction.  · Biatrial enlargement.  · Mild mitral regurgitation.  · Small pericardial effusion.  · The estimated PA systolic pressure is 51 mmHg (by tricuspid regurgitation velocity). The estimated mean PA pressure is 39mm Hg.  · Intermediate central venous pressure (8 mmHg).  . Continue ARNI and monitor clinical status closely. Monitor on telemetry. Patient is off CHF pathway.  Monitor strict Is&Os and daily weights.  Place on fluid restriction of 1.5 L. Continue to stress to patient importance of self efficacy and  on diet for CHF. 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 Michel, her goal is to cont with current therapy and go to [place] to see if she can be a candidate for transplant.    Diuresing well on lasix gtt 10/hr  Cont home  at 5  Will cont Entresto  Strict I&O  Daily wt  
Patient is identified as having Combined Systolic and Diastolic heart failure that is Acute on chronic. CHF is currently uncontrolled due to Continued edema of extremities and JVD, Dyspnea not returned to baseline after several doses of IV diuretic, >3 pillow orthopnea, Rales/crackles on pulmonary exam, and Pulmonary edema/pleural effusion on CXR. Latest ECHO performed and demonstrates- Results for orders placed during the hospital encounter of 03/26/23    Echo    Interpretation Summary  · The left ventricle is severely enlarged with eccentric hypertrophy and severely decreased systolic function. The estimated ejection fraction is 15%.  · Normal right ventricular size with moderately reduced right ventricular systolic function.  · Grade II left ventricular diastolic dysfunction.  · Biatrial enlargement.  · Mild mitral regurgitation.  · Small pericardial effusion.  · The estimated PA systolic pressure is 51 mmHg (by tricuspid regurgitation velocity). The estimated mean PA pressure is 39mm Hg.  · Intermediate central venous pressure (8 mmHg).  . Continue ARNI and monitor clinical status closely. Monitor on telemetry. Patient is off CHF pathway.  Monitor strict Is&Os and daily weights.  Place on fluid restriction of 1.5 L. Continue to stress to patient importance of self efficacy and  on diet for CHF. 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 Michel, her goal is to cont with current therapy and go to [place] to see if she can be a candidate for transplant.    Diuresing well on lasix gtt 10/hr, adding Metolazone on 12/12  Cont home  at 5  Will cont Entresto  Strict I&O  Daily wt  
Patient is identified as having Combined Systolic and Diastolic heart failure that is Acute on chronic. CHF is currently uncontrolled due to Continued edema of extremities and JVD, Dyspnea not returned to baseline after several doses of IV diuretic, >3 pillow orthopnea, Rales/crackles on pulmonary exam, and Pulmonary edema/pleural effusion on CXR. Latest ECHO performed and demonstrates- Results for orders placed during the hospital encounter of 03/26/23    Echo    Interpretation Summary  · The left ventricle is severely enlarged with eccentric hypertrophy and severely decreased systolic function. The estimated ejection fraction is 15%.  · Normal right ventricular size with moderately reduced right ventricular systolic function.  · Grade II left ventricular diastolic dysfunction.  · Biatrial enlargement.  · Mild mitral regurgitation.  · Small pericardial effusion.  · The estimated PA systolic pressure is 51 mmHg (by tricuspid regurgitation velocity). The estimated mean PA pressure is 39mm Hg.  · Intermediate central venous pressure (8 mmHg).  . Continue ARNI and monitor clinical status closely. Monitor on telemetry. Patient is off CHF pathway.  Monitor strict Is&Os and daily weights.  Place on fluid restriction of 1.5 L. Continue to stress to patient importance of self efficacy and  on diet for CHF. 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 Michel, her goal is to cont with current therapy and go to [place] to see if she can be a candidate for transplant.    Diuresing well on lasix gtt 10/hr, adding Metolazone on 12/12. Anticipate transition to PO torsemide on 12/14  Cont home  at 5  Will cont Entresto  Strict I&O  Daily wt  
Patient is identified as having Combined Systolic and Diastolic heart failure that is Acute on chronic. CHF is currently uncontrolled due to Continued edema of extremities and JVD, Dyspnea not returned to baseline after several doses of IV diuretic, >3 pillow orthopnea, Rales/crackles on pulmonary exam, and Pulmonary edema/pleural effusion on CXR. Latest ECHO performed and demonstrates- Results for orders placed during the hospital encounter of 03/26/23    Echo    Interpretation Summary  · The left ventricle is severely enlarged with eccentric hypertrophy and severely decreased systolic function. The estimated ejection fraction is 15%.  · Normal right ventricular size with moderately reduced right ventricular systolic function.  · Grade II left ventricular diastolic dysfunction.  · Biatrial enlargement.  · Mild mitral regurgitation.  · Small pericardial effusion.  · The estimated PA systolic pressure is 51 mmHg (by tricuspid regurgitation velocity). The estimated mean PA pressure is 39mm Hg.  · Intermediate central venous pressure (8 mmHg).  . Continue Beta Blocker and ARNI and monitor clinical status closely. Monitor on telemetry. Patient is off CHF pathway.  Monitor strict Is&Os and daily weights.  Place on fluid restriction of 1.5 L. Continue to stress to patient importance of self efficacy and  on diet for CHF. 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 Michel, her goal is to cont with current therapy and go to [place] to see if she can be a candidate for transplant.    Diuresing well on lasix gtt 10/hr  Cont home  at 5  Will cont GDMT as tolerated ( given cr is within bl can cont entersto as well)  Strict I&O  Daily wt  
Patient on admission with creatinine 2.4 from baseline 2.2.     -- avoid nephrotoxic medications  -- renally dose meds  -- maintain MAP > 65  -- daily CMP    
Patient on admission with creatinine 2.4 from baseline 2.2.     -- avoid nephrotoxic medications  -- renally dose meds  -- maintain MAP > 65  -- daily CMP    
Patient on admission with creatinine 2.4 from baseline 2.2. Renal function mildly increased during admission to 2.6 on 12/11, likely s/o CRS, improving with continued diuresis    -- avoid nephrotoxic medications  -- renally dose meds  -- maintain MAP > 65  -- daily CMP    
Patient on admission with creatinine 2.4 from baseline 2.2. Renal function mildly increased during admission to 2.6 on 12/11, likely s/o CRS.     -- avoid nephrotoxic medications  -- renally dose meds  -- maintain MAP > 65  -- daily CMP    
Patient on admission with creatinine 2.4 from baseline 2.2. Renal function mildly increased during admission to 2.9 on 12/11, likely s/o CRS.     -- avoid nephrotoxic medications  -- renally dose meds  -- maintain MAP > 65  -- daily CMP    
Pre and post capillary PH  RHC on 3/29/23 with following findings:  Filling pressures: RA 9, PCWP 16  PA 58/29, mean PA pressure 39 mmHg  PA saturation 45%, Ao saturation 90% on RA  PVR 5.6 BOLDEN, consistent with pre and post-capillary pulmonary hypertension  Gillian CO/CI: 4.1/2.03  SVR: 1151  BP 88/58 , MAP 68, HR 75 SR  Hb 10.7  Jayson 3.2    Will cont diuresis  
SSI while in the hospital and will transition to LA if needed  
Will cont AC with eliquis  Rate/rhythm control with BB and amiodarone  
Will cont AC with eliquis  Rate/rhythm control with BB and amiodarone  
Will cont AC with eliquis  Rate/rhythm control with amiodarone  Close monitoring of HR given patient on dobutamine  
Will cont AC with eliquis  Rate/rhythm control with amiodarone  Close monitoring of HR given patient on dobutamine  Telemetry while admitted  Mag > 2, K > 4  
Breathing spontaneous and unlabored. Breath sounds clear and equal bilaterally with regular rhythm.

## 2023-12-13 NOTE — PLAN OF CARE
Problem: Occupational Therapy  Goal: Occupational Therapy Goal  Description: Goals to be met by: 12/13/23     Patient will increase functional independence with ADLs by performing:    UE Dressing with Modified [place].  LE Dressing with Modified [place].  Grooming while standing at sink with Modified [place].  Toileting from toilet with Modified [place] for hygiene and clothing management.   Supine to sit with [place].  Step transfer with [place].    Outcome: Met

## 2023-12-13 NOTE — PLAN OF CARE
Problem: Adult Inpatient Plan of Care  Goal: Plan of Care Review  Outcome: Ongoing, Progressing  Goal: Patient-Specific Goal (Individualized)  Outcome: Ongoing, Progressing  Goal: Absence of Hospital-Acquired Illness or Injury  Outcome: Ongoing, Progressing  Goal: Optimal Comfort and Wellbeing  Outcome: Ongoing, Progressing  Goal: Readiness for Transition of Care  Outcome: Ongoing, Progressing     Problem: Diabetes Comorbidity  Goal: Blood Glucose Level Within Targeted Range  Outcome: Ongoing, Progressing     Problem: Infection  Goal: Absence of Infection Signs and Symptoms  Outcome: Ongoing, Progressing     Problem: Fluid Volume Excess  Goal: Fluid Balance  Outcome: Ongoing, Progressing     Problem: Fall Injury Risk  Goal: Absence of Fall and Fall-Related Injury  Outcome: Ongoing, Progressing   Plan of care discussed with patient. Patient is free of fall/trauma/injury. Denies CP, SOB, or pain/discomfort. All questions addressed. Will continue to monitor. Pt on Lasix gtt 1ml/hr. Dobutamine gtt running at 5mcg/hr. Pt receiving PO metalazone. AAOx4 and VSS.

## 2023-12-13 NOTE — PT/OT/SLP EVAL
"Occupational Therapy  Evaluation, Treatment and Discharge Note    Name: Hafsa Hawley  MRN: [id number]  Admitting Diagnosis: Acute decompensated heart failure  Recent Surgery: * No surgery found *      Recommendations:     Discharge Recommendations: No Therapy Indicated  Discharge Equipment Recommendations: none  Barriers to discharge:  None    Assessment:     Hafsa Hawley is a 58 y.o. female with a medical diagnosis of Acute decompensated heart failure. At this time, patient is functioning at their prior level of function and does not require further acute OT services.     Plan:     During this hospitalization, patient does not require further acute OT services.  Please re-consult if situation changes.    Plan of Care Reviewed with: patient    Subjective     Chief Complaint: "I just don't have the money for better insurance. So it is what it is."  Patient/Family Comments/goals: Get well    Occupational Profile:  Living Environment: Pt lives with  and granddaughter in a trailer with ramp entrance with rails, and has a tub/shower combo with shower chair.  Previous level of function: Independent  Roles and Routines: Watch TV, spend time with family.  Equipment Used at home: bedside commode, cane, straight, rollator, shower chair, oxygen  Assistance upon Discharge: Available    Pain/Comfort:  Pain Rating 1: 0/10  Pain Rating Post-Intervention 1: 0/10    Patients cultural, spiritual, Methodist conflicts given the current situation: no    Objective:     Communicated with: RN prior to session.  Patient found up in chair with peripheral IV, oxygen upon OT entry to room.    General Precautions: Standard, fall  Orthopedic Precautions: N/A  Braces: N/A  Respiratory Status: Nasal cannula, flow 2 L/min     Occupational Performance:    Bed Mobility:    Patient completed Supine to Sit with independence    Functional Mobility/Transfers:  Patient completed Sit <> Stand Transfer with independence  with  no assistive " device   Patient completed Bed <> Chair Transfer using Step Transfer technique with modified independence with rolling walker  Patient completed Toilet Transfer Step Transfer technique with modified independence with  rolling walker  Functional Mobility: Mod I with RW; Supervision while on PIV    Activities of Daily Living:  Grooming: independence    Upper Body Dressing: independence    Lower Body Dressing: independence    Toileting: independence      Cognitive/Visual Perceptual:  Cognitive/Psychosocial Skills:     -       Oriented to: Person, Place, Time, and Situation   -       Follows Commands/attention:Follows multistep  commands  -       Safety awareness/insight to disability: intact     Physical Exam:  Upper Extremity Range of Motion:     -       Right Upper Extremity: WFL  -       Left Upper Extremity: WFL  Upper Extremity Strength:    -       Right Upper Extremity: WFL  -       Left Upper Extremity: WFL   Strength:    -       Right Upper Extremity: WFL  -       Left Upper Extremity: WFL  Fine Motor Coordination:    -       Intact  Gross motor coordination:   WFL    AMPAC 6 Click ADL:  AMPAC Total Score: 24    Treatment & Education:  Pt edu re OT role, POC and safety.    Patient left up in chair with all lines intact and call button in reach    GOALS:   Multidisciplinary Problems       Occupational Therapy Goals       Not on file              Multidisciplinary Problems (Resolved)          Problem: Occupational Therapy    Goal Priority Disciplines Outcome Interventions   Occupational Therapy Goal   (Resolved)     OT, PT/OT Met    Description: Goals to be met by: 12/13/23     Patient will increase functional independence with ADLs by performing:    UE Dressing with Modified [place].  LE Dressing with Modified [place].  Grooming while standing at sink with Modified [place].  Toileting from toilet with Modified [place] for hygiene and clothing management.   Supine to sit with  [place].  Step transfer with [place].                         History:     Past Medical History:   Diagnosis Date    Anemia     Arthritis     Atrial fibrillation     OAC    Chronic respiratory failure with hypoxia, on home oxygen therapy     2L with activity, off at rest.  Per Pulm  no overt evidence of ILD or COPD on PFTs and CT to explain O2 needs.    CKD (chronic kidney disease), stage IV 05/08/2018    Congestive heart failure     s/p AICD placement,    Deep vein thrombosis     Depression     elevated bilirubin d/t Gilbert's syndrome     confirmed by [facility] genetic testing, evaluated by hepatology    Encounter for blood transfusion     GERD (gastroesophageal reflux disease)     Hypertension     Pheochromocytoma, malignant     Right kidney mass     Sleep apnea     Thalassemia trait, alpha     Thyroid disease     Type 2 diabetes mellitus with hyperglycemia, without long-term current use of insulin 08/13/2020         Past Surgical History:   Procedure Laterality Date    APPENDECTOMY      BONE MARROW BIOPSY      CARDIAC DEFIBRILLATOR PLACEMENT Left 12/2016    CARDIAC ELECTROPHYSIOLOGY MAPPING AND ABLATION      CARDIAC ELECTROPHYSIOLOGY MAPPING AND ABLATION      COLONOSCOPY N/A 5/6/2022    Procedure: COLONOSCOPY;  Surgeon: Arely Betancourt MD;  [facility] ([address] [place] [address]);  Service: Endoscopy;  Laterality: N/A;  heart transplant candidate/ EF 25% - 2nd floor/ defib - Biotronik - ERW  Eliquis - per Dr. Cortez with [facility], Pt ok to hold Eliquis x 2 days prior-see media tab-outside correspondence dated 12/30/21  - ERW  verbal instructions/portal instructions/email instructions - s    EYE SURGERY      due to running tears    FRACTURE SURGERY Left     hand 5th digit    HYSTERECTOMY      KNEE SURGERY Left 2016    hematoma    LIVER BIOPSY  10/24/2018    Minimal steatosis, predominantly macrovesicular, 1%, Minimal nonspecific portal inflammation, no fibrosis. No findings on biopsy to explain elevated bilirubin  levels. Could be d/t Gilbert's =?- hemolysis    RIGHT HEART CATHETERIZATION Right 12/07/2021    Procedure: INSERTION, CATHETER, RIGHT HEART;  Surgeon: Irving Cardenas MD;  Location: [facility] CATH [facility];  Service: Cardiology;  Laterality: Right;    RIGHT HEART CATHETERIZATION Right 12/19/2022    Procedure: INSERTION, CATHETER, RIGHT HEART;  Surgeon: Burke Camilo MD;  Location: [facility]ATH [facility];  Service: Cardiology;  Laterality: Right;    RIGHT HEART CATHETERIZATION Right 3/29/2023    Procedure: INSERTION, CATHETER, RIGHT HEART;  Surgeon: Katie Liriano DO;  Location: [facility] CATH [facility];  Service: Cardiology;  Laterality: Right;    TRANSJUGULAR BIOPSY OF LIVER N/A 10/24/2018    Procedure: BIOPSY, LIVER, TRANSJUGULAR APPROACH;  Surgeon: Carmen Diagnostic Provider;  Location: [facility] [place] [address] [place] [address];  Service: Radiology;  Laterality: N/A;       Time Tracking:     OT Date of Treatment: 12/13/23  OT Start Time: 1502  OT Stop Time: 1520  OT Total Time (min): 18 min    Billable Minutes:Evaluation 10 minutes  Therapeutic Activity 8 minutes    YARY Keenan  12/13/2023  Pager: [phone]

## 2023-12-13 NOTE — PLAN OF CARE
SW confirmed with Sandra with [facility] that pt was current with them and will resume care when medically stable.  SW met with pt at bedside regarding insurance questions (per Dr Escalante's request) but pt explained that she found out that she does not have enough income to reapply for her Blue Cross Blue Shield on the marketplace and therefore will only have her Medicaid for now.  SW will continue to follow.      Lourdes Elder, YAMILETH  [facility]  [phone]

## 2023-12-13 NOTE — PROGRESS NOTES
[facility] - Cardiology Stepdown  Cardiology  Progress Note    Patient Name: Hafsa Hawley  MRN: [id number]  Admission Date: 12/8/2023  Hospital Length of Stay: 5 days  Code Status: Full Code   Attending Physician: Althea Escalante MD   Primary Care Physician: Cristobal Ann MD  Expected Discharge Date: 12/13/2023  Principal Problem:Acute decompensated heart failure    Subjective:     HPI:  patient is a 58 y.o female with h/o end stage niCM with EF 10%, not a candidate for advanced options, on palliative home  who presents as transfer from [facility] ED for ADHF. She initially presented to the OSH for worsening SOB.   on arrival to [facility] ED labs were signficant for cr 2.4 (bl  ), BNP was noted to be 3000, Trop 1.036, procal 0.35.  on arrival to [facility] she was found to be hemodynamically stable. Denies cp/chest discomfort but does report sob/iniguez, pnd.    TTE on 3/27/23 with EF 15%, PASP 51 mmhg      Hospital Course:   Patient admitted to CCU for management of acute decompensated end-stage HF. Patient continued on home  dose of 5. Started on lasix gtt and diuresing well. Continuing home entresto while admitted. Stopped BB given patient on dobutamine. Patient continues to diurese well, NN 2 liters each day. Added metolazone on 12/12 with improved diuresis.    Interval History: NAEON, 4.3L UOP, NN 3.1L yesterday on metolazone and lasix gtt. Sxs improved today, congestion improved.     Review of Systems   Constitutional: Negative for chills, decreased appetite, diaphoresis, fever and night sweats.   HENT: Negative.     Eyes: Negative.    Cardiovascular:  Positive for dyspnea on exertion, leg swelling, orthopnea and paroxysmal nocturnal dyspnea. Negative for chest pain, irregular heartbeat, near-syncope, palpitations and syncope.   Respiratory:  Positive for cough and shortness of breath. Negative for hemoptysis and sputum production.    Skin:  Negative for rash.   Gastrointestinal:  Negative for abdominal pain,  change in bowel habit, hematemesis, hematochezia, melena, nausea and vomiting.     Objective:     Vital Signs (Most Recent):  Temp: 97.9 °F (36.6 °C) (12/13/23 0745)  Pulse: 87 (12/13/23 0745)  Resp: 20 (12/13/23 0745)  BP: 121/75 (12/13/23 0745)  SpO2: 99 % (12/13/23 0745) Vital Signs (24h Range):  Temp:  [97.5 °F (36.4 °C)-98.4 °F (36.9 °C)] 97.9 °F (36.6 °C)  Pulse:  [] 87  Resp:  [18-20] 20  SpO2:  [93 %-99 %] 99 %  BP: (114-131)/(55-86) 121/75     Weight: 88.6 kg (195 lb 5.2 oz)  Body mass index is 31.53 kg/m².     SpO2: 99 %         Intake/Output Summary (Last 24 hours) at 12/13/2023 0907  Last data filed at 12/13/2023 0814  Gross per 24 hour   Intake 1662 ml   Output 4800 ml   Net -3138 ml         Lines/Drains/Airways       Peripherally Inserted Central Catheter Line  Duration             PICC Double Lumen 05/12/23 1534 right brachial 214 days                       Physical Exam  Vitals reviewed.   Constitutional:       Appearance: Normal appearance. She is obese.   HENT:      Head: Normocephalic and atraumatic.      Nose: Congestion and rhinorrhea present.      Mouth/Throat:      Mouth: Mucous membranes are dry.   Eyes:      General: No scleral icterus.     Extraocular Movements: Extraocular movements intact.      Conjunctiva/sclera: Conjunctivae normal.      Pupils: Pupils are equal, round, and reactive to light.   Cardiovascular:      Rate and Rhythm: Normal rate and regular rhythm.      Pulses: Normal pulses.      Heart sounds: Murmur heard.   Pulmonary:      Effort: Pulmonary effort is normal.   Abdominal:      General: There is no distension.   Musculoskeletal:         General: Swelling present.      Right lower leg: Edema (trace pitting, improved) present.      Left lower leg: Edema (trace pitting, improved) present.   Skin:     General: Skin is warm.   Neurological:      General: No focal deficit present.      Mental Status: She is alert and oriented to person, place, and time.   Psychiatric:          Mood and Affect: Mood normal.         Behavior: Behavior normal.            Significant Labs: CMP   Recent Labs   Lab 12/12/23  0304 12/12/23  1910 12/13/23  0440    143 141   K 3.8 4.5 3.8    102 102   CO2 26 28 26    147* 96   BUN 85* 82* 81*   CREATININE 2.6* 2.7* 2.3*   CALCIUM 8.9 9.2 9.6   PROT 6.1 6.5 6.6   ALBUMIN 3.4* 3.5 3.6   BILITOT 1.1* 1.3* 2.0*   ALKPHOS 51* 57 56   AST 15 19 18   ALT 16 19 18   ANIONGAP 13 13 13      and CBC   Recent Labs   Lab 12/12/23  0304 12/13/23  0440   WBC 4.11 3.84*   HGB 9.6* 10.5*   HCT 32.4* 35.8*    174         Significant Imaging:  All relevant imaging studies have been reviewed.  Assessment and Plan:       * Acute decompensated heart failure  Patient is identified as having Combined Systolic and Diastolic heart failure that is Acute on chronic. CHF is currently uncontrolled due to Continued edema of extremities and JVD, Dyspnea not returned to baseline after several doses of IV diuretic, >3 pillow orthopnea, Rales/crackles on pulmonary exam, and Pulmonary edema/pleural effusion on CXR. Latest ECHO performed and demonstrates- Results for orders placed during the hospital encounter of 03/26/23    Echo    Interpretation Summary  · The left ventricle is severely enlarged with eccentric hypertrophy and severely decreased systolic function. The estimated ejection fraction is 15%.  · Normal right ventricular size with moderately reduced right ventricular systolic function.  · Grade II left ventricular diastolic dysfunction.  · Biatrial enlargement.  · Mild mitral regurgitation.  · Small pericardial effusion.  · The estimated PA systolic pressure is 51 mmHg (by tricuspid regurgitation velocity). The estimated mean PA pressure is 39mm Hg.  · Intermediate central venous pressure (8 mmHg).  . Continue ARNI and monitor clinical status closely. Monitor on telemetry. Patient is off CHF pathway.  Monitor strict Is&Os and daily weights.  Place on fluid  restriction of 1.5 L. Continue to stress to patient importance of self efficacy and  on diet for CHF. 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 Michel, her goal is to cont with current therapy and go to [facility] to see if she can be a candidate for transplant.    Diuresing well on lasix gtt 10/hr, adding Metolazone on 12/12. Anticipate transition to PO torsemide on 12/14  Cont home  at 5  Will cont Entresto  Strict I&O  Daily wt    CKD (chronic kidney disease) stage 4, GFR 15-29 ml/min  Bl creatinine 2.2-2.6  Currently within bl range    Paroxysmal A-fib  Will cont AC with eliquis  Rate/rhythm control with amiodarone  Close monitoring of HR given patient on dobutamine  Telemetry while admitted  Mag > 2, K > 4    Pulmonary hypertension  Pre and post capillary PH  RHC on 3/29/23 with following findings:  Filling pressures: RA 9, PCWP 16  PA 58/29, mean PA pressure 39 mmHg  PA saturation 45%, Ao saturation 90% on RA  PVR 5.6 BOLDEN, consistent with pre and post-capillary pulmonary hypertension  Gillian CO/CI: 4.1/2.03  SVR: 1151  BP 88/58 , MAP 68, HR 75 SR  Hb 10.7  Jayson 3.2    Will cont diuresis    CKD (chronic kidney disease), stage IV  Patient on admission with creatinine 2.4 from baseline 2.2. Renal function mildly increased during admission to 2.6 on 12/11, likely s/o CRS, improving with continued diuresis    -- avoid nephrotoxic medications  -- renally dose meds  -- maintain MAP > 65  -- daily CMP      Type 2 diabetes mellitus with stage 4 chronic kidney disease, without long-term current use of insulin  -- BGL stable during admission, can add SSI as needed for hyperglycemia    Essential hypertension  -- continue home entresto while admitted    Microcytic anemia  -- hgb on admission at 9 (at baseline). Stable during  hospitalization  -- continue home iron supplementation  -- daily CBC, transfuse for Hgb < 7        VTE Risk Mitigation (From admission, onward)           Ordered     apixaban tablet 5 mg  2 times daily         12/08/23 2055     IP VTE HIGH RISK PATIENT  Once         12/08/23 1945     Place sequential compression device  Until discontinued         12/08/23 1945                    Glao Russ MD  Cardiology  [facility] - Cardiology Stepdown

## 2023-12-14 LAB
ALBUMIN SERPL BCP-MCNC: 3.8 G/DL (ref 3.5–5.2)
ALBUMIN SERPL BCP-MCNC: 4 G/DL (ref 3.5–5.2)
ALP SERPL-CCNC: 62 U/L (ref 55–135)
ALP SERPL-CCNC: 81 U/L (ref 55–135)
ALT SERPL W/O P-5'-P-CCNC: 21 U/L (ref 10–44)
ALT SERPL W/O P-5'-P-CCNC: 26 U/L (ref 10–44)
ANION GAP SERPL CALC-SCNC: 15 MMOL/L (ref 8–16)
ANION GAP SERPL CALC-SCNC: 16 MMOL/L (ref 8–16)
ANISOCYTOSIS BLD QL SMEAR: ABNORMAL
AST SERPL-CCNC: 22 U/L (ref 10–40)
AST SERPL-CCNC: 44 U/L (ref 10–40)
BASOPHILS # BLD AUTO: 0.02 K/UL (ref 0–0.2)
BASOPHILS NFR BLD: 0.5 % (ref 0–1.9)
BILIRUB SERPL-MCNC: 1.4 MG/DL (ref 0.1–1)
BILIRUB SERPL-MCNC: 1.6 MG/DL (ref 0.1–1)
BUN SERPL-MCNC: 110 MG/DL (ref 6–20)
BUN SERPL-MCNC: 99 MG/DL (ref 6–20)
CALCIUM SERPL-MCNC: 10 MG/DL (ref 8.7–10.5)
CALCIUM SERPL-MCNC: 9.8 MG/DL (ref 8.7–10.5)
CHLORIDE SERPL-SCNC: 101 MMOL/L (ref 95–110)
CHLORIDE SERPL-SCNC: 96 MMOL/L (ref 95–110)
CO2 SERPL-SCNC: 24 MMOL/L (ref 23–29)
CO2 SERPL-SCNC: 26 MMOL/L (ref 23–29)
CREAT SERPL-MCNC: 2.6 MG/DL (ref 0.5–1.4)
CREAT SERPL-MCNC: 3.1 MG/DL (ref 0.5–1.4)
DIFFERENTIAL METHOD: ABNORMAL
EOSINOPHIL # BLD AUTO: 0.1 K/UL (ref 0–0.5)
EOSINOPHIL NFR BLD: 2.9 % (ref 0–8)
ERYTHROCYTE [DISTWIDTH] IN BLOOD BY AUTOMATED COUNT: 34.6 % (ref 11.5–14.5)
EST. GFR  (NO RACE VARIABLE): 16.8 ML/MIN/1.73 M^2
EST. GFR  (NO RACE VARIABLE): 20.7 ML/MIN/1.73 M^2
GIANT PLATELETS BLD QL SMEAR: PRESENT
GLUCOSE SERPL-MCNC: 102 MG/DL (ref 70–110)
GLUCOSE SERPL-MCNC: 189 MG/DL (ref 70–110)
HCT VFR BLD AUTO: 39.7 % (ref 37–48.5)
HGB BLD-MCNC: 11.4 G/DL (ref 12–16)
HYPOCHROMIA BLD QL SMEAR: ABNORMAL
IMM GRANULOCYTES # BLD AUTO: 0.04 K/UL (ref 0–0.04)
IMM GRANULOCYTES NFR BLD AUTO: 1 % (ref 0–0.5)
LYMPHOCYTES # BLD AUTO: 0.9 K/UL (ref 1–4.8)
LYMPHOCYTES NFR BLD: 21 % (ref 18–48)
MAGNESIUM SERPL-MCNC: 2.5 MG/DL (ref 1.6–2.6)
MAGNESIUM SERPL-MCNC: 2.6 MG/DL (ref 1.6–2.6)
MCH RBC QN AUTO: 17.9 PG (ref 27–31)
MCHC RBC AUTO-ENTMCNC: 28.7 G/DL (ref 32–36)
MCV RBC AUTO: 62 FL (ref 82–98)
MONOCYTES # BLD AUTO: 0.5 K/UL (ref 0.3–1)
MONOCYTES NFR BLD: 12.1 % (ref 4–15)
NEUTROPHILS # BLD AUTO: 2.6 K/UL (ref 1.8–7.7)
NEUTROPHILS NFR BLD: 62.5 % (ref 38–73)
NRBC BLD-RTO: 1 /100 WBC
PLATELET # BLD AUTO: 177 K/UL (ref 150–450)
PMV BLD AUTO: ABNORMAL FL (ref 9.2–12.9)
POIKILOCYTOSIS BLD QL SMEAR: ABNORMAL
POLYCHROMASIA BLD QL SMEAR: ABNORMAL
POTASSIUM SERPL-SCNC: 4.4 MMOL/L (ref 3.5–5.1)
POTASSIUM SERPL-SCNC: 4.4 MMOL/L (ref 3.5–5.1)
PROT SERPL-MCNC: 7 G/DL (ref 6–8.4)
PROT SERPL-MCNC: 7.9 G/DL (ref 6–8.4)
RBC # BLD AUTO: 6.38 M/UL (ref 4–5.4)
SCHISTOCYTES BLD QL SMEAR: ABNORMAL
SODIUM SERPL-SCNC: 137 MMOL/L (ref 136–145)
SODIUM SERPL-SCNC: 141 MMOL/L (ref 136–145)
SPHEROCYTES BLD QL SMEAR: ABNORMAL
TARGETS BLD QL SMEAR: ABNORMAL
WBC # BLD AUTO: 4.14 K/UL (ref 3.9–12.7)

## 2023-12-14 PROCEDURE — 80053 COMPREHEN METABOLIC PANEL: CPT

## 2023-12-14 PROCEDURE — 97161 PT EVAL LOW COMPLEX 20 MIN: CPT

## 2023-12-14 PROCEDURE — 20600001 HC STEP DOWN PRIVATE ROOM

## 2023-12-14 PROCEDURE — 36415 COLL VENOUS BLD VENIPUNCTURE: CPT | Performed by: INTERNAL MEDICINE

## 2023-12-14 PROCEDURE — 25000003 PHARM REV CODE 250: Performed by: STUDENT IN AN ORGANIZED HEALTH CARE EDUCATION/TRAINING PROGRAM

## 2023-12-14 PROCEDURE — 25000003 PHARM REV CODE 250

## 2023-12-14 PROCEDURE — 83735 ASSAY OF MAGNESIUM: CPT | Mod: 91 | Performed by: INTERNAL MEDICINE

## 2023-12-14 PROCEDURE — 99231 SBSQ HOSP IP/OBS SF/LOW 25: CPT | Mod: ,,, | Performed by: INTERNAL MEDICINE

## 2023-12-14 PROCEDURE — 97116 GAIT TRAINING THERAPY: CPT

## 2023-12-14 PROCEDURE — 80053 COMPREHEN METABOLIC PANEL: CPT | Mod: 91 | Performed by: INTERNAL MEDICINE

## 2023-12-14 PROCEDURE — 83735 ASSAY OF MAGNESIUM: CPT

## 2023-12-14 PROCEDURE — 36415 COLL VENOUS BLD VENIPUNCTURE: CPT

## 2023-12-14 PROCEDURE — 99231 PR SUBSEQUENT HOSPITAL CARE,LEVL I: ICD-10-PCS | Mod: ,,, | Performed by: INTERNAL MEDICINE

## 2023-12-14 PROCEDURE — 85025 COMPLETE CBC W/AUTO DIFF WBC: CPT

## 2023-12-14 RX ORDER — BUMETANIDE 1 MG/1
4 TABLET ORAL DAILY
Status: DISCONTINUED | OUTPATIENT
Start: 2023-12-14 | End: 2023-12-15 | Stop reason: HOSPADM

## 2023-12-14 RX ORDER — CALCIUM CARBONATE 200(500)MG
500 TABLET,CHEWABLE ORAL 2 TIMES DAILY PRN
Status: DISCONTINUED | OUTPATIENT
Start: 2023-12-14 | End: 2023-12-15 | Stop reason: HOSPADM

## 2023-12-14 RX ORDER — BUMETANIDE 1 MG/1
2 TABLET ORAL NIGHTLY
Status: DISCONTINUED | OUTPATIENT
Start: 2023-12-14 | End: 2023-12-15 | Stop reason: HOSPADM

## 2023-12-14 RX ADMIN — BUMETANIDE 2 MG: 1 TABLET ORAL at 09:12

## 2023-12-14 RX ADMIN — ALLOPURINOL 300 MG: 300 TABLET ORAL at 09:12

## 2023-12-14 RX ADMIN — METOLAZONE 5 MG: 5 TABLET ORAL at 08:12

## 2023-12-14 RX ADMIN — ATORVASTATIN CALCIUM 40 MG: 40 TABLET, FILM COATED ORAL at 09:12

## 2023-12-14 RX ADMIN — BUMETANIDE 4 MG: 1 TABLET ORAL at 08:12

## 2023-12-14 RX ADMIN — AMIODARONE HYDROCHLORIDE 200 MG: 200 TABLET ORAL at 08:12

## 2023-12-14 RX ADMIN — CALCIUM CARBONATE (ANTACID) CHEW TAB 500 MG 500 MG: 500 CHEW TAB at 11:12

## 2023-12-14 RX ADMIN — SACUBITRIL AND VALSARTAN 1 TABLET: 49; 51 TABLET, FILM COATED ORAL at 09:12

## 2023-12-14 RX ADMIN — SACUBITRIL AND VALSARTAN 1 TABLET: 49; 51 TABLET, FILM COATED ORAL at 08:12

## 2023-12-14 RX ADMIN — APIXABAN 5 MG: 5 TABLET, FILM COATED ORAL at 08:12

## 2023-12-14 RX ADMIN — GUAIFENESIN 200 MG: 200 SOLUTION ORAL at 09:12

## 2023-12-14 RX ADMIN — APIXABAN 5 MG: 5 TABLET, FILM COATED ORAL at 09:12

## 2023-12-14 NOTE — SUBJECTIVE & OBJECTIVE
Interval History: NAEON. Urine output 2.4L overnight; net-1 since yesterday. Cr 2.6 which is stable. Cxr appears to be stable when compared to yesterday. On RA.   Transitioned to home bumetanide and daily metolazone. If cont to do well will discharge tomorrow.     Review of Systems   Constitutional: Negative for chills, decreased appetite, diaphoresis, fever and night sweats.   HENT: Negative.     Eyes: Negative.    Cardiovascular:  Positive for dyspnea on exertion and paroxysmal nocturnal dyspnea. Negative for chest pain, irregular heartbeat, leg swelling, near-syncope, palpitations and syncope.   Respiratory:  Positive for cough and shortness of breath. Negative for hemoptysis and sputum production.    Skin:  Negative for rash.   Gastrointestinal:  Negative for abdominal pain, change in bowel habit, hematemesis, hematochezia, melena, nausea and vomiting.     Objective:     Vital Signs (Most Recent):  Temp: 98.4 °F (36.9 °C) (12/14/23 0836)  Pulse: 96 (12/14/23 0836)  Resp: 18 (12/14/23 0836)  BP: 111/73 (12/14/23 0836)  SpO2: 99 % (12/14/23 0836) Vital Signs (24h Range):  Temp:  [97.5 °F (36.4 °C)-98.6 °F (37 °C)] 98.4 °F (36.9 °C)  Pulse:  [81-96] 96  Resp:  [18] 18  SpO2:  [97 %-99 %] 99 %  BP: (111-125)/(70-82) 111/73     Weight: 88.6 kg (195 lb 5.2 oz)  Body mass index is 31.53 kg/m².     SpO2: 99 %         Intake/Output Summary (Last 24 hours) at 12/14/2023 1616  Last data filed at 12/14/2023 1613  Gross per 24 hour   Intake 1720 ml   Output 2400 ml   Net -680 ml         Lines/Drains/Airways       Peripherally Inserted Central Catheter Line  Duration             PICC Double Lumen 05/12/23 1534 right brachial 216 days                       Physical Exam  Vitals reviewed.   Constitutional:       Appearance: Normal appearance. She is obese.   HENT:      Head: Normocephalic and atraumatic.      Nose: Congestion and rhinorrhea present.      Mouth/Throat:      Mouth: Mucous membranes are dry.   Eyes:      General:  No scleral icterus.     Extraocular Movements: Extraocular movements intact.      Conjunctiva/sclera: Conjunctivae normal.      Pupils: Pupils are equal, round, and reactive to light.   Cardiovascular:      Rate and Rhythm: Normal rate and regular rhythm.      Pulses: Normal pulses.      Heart sounds: Murmur heard.   Pulmonary:      Effort: Pulmonary effort is normal.   Abdominal:      General: There is no distension.   Musculoskeletal:         General: Swelling present.      Right lower leg: Edema (trace pitting, improved) present.      Left lower leg: Edema (trace pitting, improved) present.   Skin:     General: Skin is warm.   Neurological:      General: No focal deficit present.      Mental Status: She is alert and oriented to person, place, and time.   Psychiatric:         Mood and Affect: Mood normal.         Behavior: Behavior normal.            Significant Labs: CMP   Recent Labs   Lab 12/13/23 0440 12/13/23 1945 12/14/23 0419    140 141   K 3.8 4.3 4.4    99 101   CO2 26 27 24   GLU 96 143* 102   BUN 81* 85* 99*   CREATININE 2.3* 2.6* 2.6*   CALCIUM 9.6 10.0 10.0   PROT 6.6 7.4 7.0   ALBUMIN 3.6 4.0 3.8   BILITOT 2.0* 1.6* 1.6*   ALKPHOS 56 70 62   AST 18 21 22   ALT 18 20 21   ANIONGAP 13 14 16      and CBC   Recent Labs   Lab 12/13/23 0440 12/14/23 0419   WBC 3.84* 4.14   HGB 10.5* 11.4*   HCT 35.8* 39.7    177         Significant Imaging:  All relevant imaging studies have been reviewed.

## 2023-12-14 NOTE — PLAN OF CARE
Problem: Adult Inpatient Plan of Care  Goal: Plan of Care Review  Outcome: Ongoing, Progressing  Goal: Patient-Specific Goal (Individualized)  Outcome: Ongoing, Progressing  Goal: Absence of Hospital-Acquired Illness or Injury  Outcome: Ongoing, Progressing  Goal: Optimal Comfort and Wellbeing  Outcome: Ongoing, Progressing  Goal: Readiness for Transition of Care  Outcome: Ongoing, Progressing     Problem: Diabetes Comorbidity  Goal: Blood Glucose Level Within Targeted Range  Outcome: Ongoing, Progressing     Problem: Infection  Goal: Absence of Infection Signs and Symptoms  Outcome: Ongoing, Progressing     Problem: Fluid Volume Excess  Goal: Fluid Balance  Outcome: Ongoing, Progressing     Problem: Fall Injury Risk  Goal: Absence of Fall and Fall-Related Injury  Outcome: Ongoing, Progressing

## 2023-12-14 NOTE — PROGRESS NOTES
[facility] Step[facility]  Cardiology  Progress Note    Patient Name: Hafsa Hawley  MRN: [id number]  Admission Date: 12/8/2023  Hospital Length of Stay: 6 days  Code Status: Full Code   Attending Physician: Althea Escalante MD   Primary Care Physician: Cristobal Ann MD  Expected Discharge Date: 12/15/2023  Principal Problem:Acute decompensated heart failure    Subjective:     Hospital Course:   Patient admitted to CCU for management of acute decompensated end-stage HF. Patient continued on home  dose of 5. Started on lasix gtt and diuresing well. Continuing home entresto while admitted. Stopped BB given patient on dobutamine. Patient continues to diurese well, NN 2 liters each day. Added metolazone on 12/12 with improved diuresis. Transitioned to home bumetanide and daily metolazone. If cont to do well will discharge tomorrow.     Interval History: NAEON. Urine output 2.4L overnight; net-1 since yesterday. Cr 2.6 which is stable. Cxr appears to be stable when compared to yesterday. On RA.   Transitioned to home bumetanide and daily metolazone. If cont to do well will discharge tomorrow.     Review of Systems   Constitutional: Negative for chills, decreased appetite, diaphoresis, fever and night sweats.   HENT: Negative.     Eyes: Negative.    Cardiovascular:  Positive for dyspnea on exertion and paroxysmal nocturnal dyspnea. Negative for chest pain, irregular heartbeat, leg swelling, near-syncope, palpitations and syncope.   Respiratory:  Positive for cough and shortness of breath. Negative for hemoptysis and sputum production.    Skin:  Negative for rash.   Gastrointestinal:  Negative for abdominal pain, change in bowel habit, hematemesis, hematochezia, melena, nausea and vomiting.     Objective:     Vital Signs (Most Recent):  Temp: 98.4 °F (36.9 °C) (12/14/23 0836)  Pulse: 96 (12/14/23 0836)  Resp: 18 (12/14/23 0836)  BP: 111/73 (12/14/23 0836)  SpO2: 99 % (12/14/23 0836) Vital Signs (24h  Range):  Temp:  [97.5 °F (36.4 °C)-98.6 °F (37 °C)] 98.4 °F (36.9 °C)  Pulse:  [81-96] 96  Resp:  [18] 18  SpO2:  [97 %-99 %] 99 %  BP: (111-125)/(70-82) 111/73     Weight: 88.6 kg (195 lb 5.2 oz)  Body mass index is 31.53 kg/m².     SpO2: 99 %         Intake/Output Summary (Last 24 hours) at 12/14/2023 1616  Last data filed at 12/14/2023 1613  Gross per 24 hour   Intake 1720 ml   Output 2400 ml   Net -680 ml         Lines/Drains/Airways       Peripherally Inserted Central Catheter Line  Duration             PICC Double Lumen 05/12/23 1534 right brachial 216 days                       Physical Exam  Vitals reviewed.   Constitutional:       Appearance: Normal appearance. She is obese.   HENT:      Head: Normocephalic and atraumatic.      Nose: Congestion and rhinorrhea present.      Mouth/Throat:      Mouth: Mucous membranes are dry.   Eyes:      General: No scleral icterus.     Extraocular Movements: Extraocular movements intact.      Conjunctiva/sclera: Conjunctivae normal.      Pupils: Pupils are equal, round, and reactive to light.   Cardiovascular:      Rate and Rhythm: Normal rate and regular rhythm.      Pulses: Normal pulses.      Heart sounds: Murmur heard.   Pulmonary:      Effort: Pulmonary effort is normal.   Abdominal:      General: There is no distension.   Musculoskeletal:         General: Swelling present.      Right lower leg: Edema (trace pitting, improved) present.      Left lower leg: Edema (trace pitting, improved) present.   Skin:     General: Skin is warm.   Neurological:      General: No focal deficit present.      Mental Status: She is alert and oriented to person, place, and time.   Psychiatric:         Mood and Affect: Mood normal.         Behavior: Behavior normal.            Significant Labs: CMP   Recent Labs   Lab 12/13/23  0440 12/13/23  1945 12/14/23  0419    140 141   K 3.8 4.3 4.4    99 101   CO2 26 27 24   GLU 96 143* 102   BUN 81* 85* 99*   CREATININE 2.3* 2.6* 2.6*    CALCIUM 9.6 10.0 10.0   PROT 6.6 7.4 7.0   ALBUMIN 3.6 4.0 3.8   BILITOT 2.0* 1.6* 1.6*   ALKPHOS 56 70 62   AST 18 21 22   ALT 18 20 21   ANIONGAP 13 14 16      and CBC   Recent Labs   Lab 12/13/23  0440 12/14/23  0419   WBC 3.84* 4.14   HGB 10.5* 11.4*   HCT 35.8* 39.7    177         Significant Imaging:  All relevant imaging studies have been reviewed.  Assessment and Plan:       * Acute decompensated heart failure  Patient is identified as having Combined Systolic and Diastolic heart failure that is Acute on chronic. CHF is currently uncontrolled due to Continued edema of extremities and JVD, Dyspnea not returned to baseline after several doses of IV diuretic, >3 pillow orthopnea, Rales/crackles on pulmonary exam, and Pulmonary edema/pleural effusion on CXR. Latest ECHO performed and demonstrates- Results for orders placed during the hospital encounter of 03/26/23    Echo    Interpretation Summary  · The left ventricle is severely enlarged with eccentric hypertrophy and severely decreased systolic function. The estimated ejection fraction is 15%.  · Normal right ventricular size with moderately reduced right ventricular systolic function.  · Grade II left ventricular diastolic dysfunction.  · Biatrial enlargement.  · Mild mitral regurgitation.  · Small pericardial effusion.  · The estimated PA systolic pressure is 51 mmHg (by tricuspid regurgitation velocity). The estimated mean PA pressure is 39mm Hg.  · Intermediate central venous pressure (8 mmHg).  . Continue ARNI and monitor clinical status closely. Monitor on telemetry. Patient is off CHF pathway.  Monitor strict Is&Os and daily weights.  Place on fluid restriction of 1.5 L. Continue to stress to patient importance of self efficacy and  on diet for CHF. Last BNP reviewed- and noted below   Recent Labs   Lab 12/13/23  0851   BNP 2,605*       ADHF:  2/2 niCM (EF 10%)  Stage D, NYHA   not a candidate for advanced options  on home palliative    pt sees outpatient palliative medicine, Dr. Michel, her goal is to cont with current therapy and go to [place] to see if she can be a candidate for transplant.    Transitioned to Torsemide on 12/14.  Cont home  at 5  Will cont Entresto  Strict I&O  Daily wt    CKD (chronic kidney disease) stage 4, GFR 15-29 ml/min  Bl creatinine 2.2-2.6  Currently within bl range    Paroxysmal A-fib  Will cont AC with eliquis  Rate/rhythm control with amiodarone  Close monitoring of HR given patient on dobutamine  Telemetry while admitted  Mag > 2, K > 4    Pulmonary hypertension  Pre and post capillary PH  RHC on 3/29/23 with following findings:  Filling pressures: RA 9, PCWP 16  PA 58/29, mean PA pressure 39 mmHg  PA saturation 45%, Ao saturation 90% on RA  PVR 5.6 BOLDEN, consistent with pre and post-capillary pulmonary hypertension  Gillian CO/CI: 4.1/2.03  SVR: 1151  BP 88/58 , MAP 68, HR 75 SR  Hb 10.7  Jayson 3.2    Will cont diuresis    CKD (chronic kidney disease), stage IV  Patient on admission with creatinine 2.4 from baseline 2.2. Renal function mildly increased during admission to 2.6 on 12/11, likely s/o CRS, improving with continued diuresis    -- avoid nephrotoxic medications  -- renally dose meds  -- maintain MAP > 65  -- daily CMP      Type 2 diabetes mellitus with stage 4 chronic kidney disease, without long-term current use of insulin  -- BGL stable during admission, can add SSI as needed for hyperglycemia    Essential hypertension  -- continue home entresto while admitted    Microcytic anemia  -- hgb on admission at 9 (at baseline). Stable during hospitalization  -- continue home iron supplementation  -- daily CBC, transfuse for Hgb < 7        VTE Risk Mitigation (From admission, onward)           Ordered     apixaban tablet 5 mg  2 times daily         12/08/23 2055     IP VTE HIGH RISK PATIENT  Once         12/08/23 1945     Place sequential compression device  Until discontinued         12/08/23 1945                     Juana Beatty MD  Cardiology  [facility]

## 2023-12-14 NOTE — PLAN OF CARE
[facility]  Discharge Reassessment    Primary Care Provider: Cristobal Ann MD    Expected Discharge Date: 12/15/2023    Reassessment (most recent)       Discharge Reassessment - 12/14/23 1135          Discharge Reassessment    Assessment Type Discharge Planning Reassessment     Discharge Plan discussed with: Patient     Discharge Plan A Home with family     Discharge Plan B Home     DME Needed Upon Discharge  none     Transition of Care Barriers None     Why the patient remains in the hospital Requires continued medical care        Post-Acute Status    Post-Acute Authorization [facility]                 Per Sandra with [facility] they will resume pt's home  when pt is medically cleared.  Discharge Plan A and Plan B have been determined by review of patient's clinical status, future medical and therapeutic needs, and coverage/benefits for post-acute care in coordination with multidisciplinary team members.  SW name and ext on whiteboard.  Will continue to follow.      Lourdes Elder LMSW  [facility]  [phone]

## 2023-12-14 NOTE — PLAN OF CARE
Problem: Physical Therapy  Goal: Physical Therapy Goal  Description: Goals to be met by: 12/14/2024     1. PT evaluation to assess functional ability and safety. - met 12/14    Outcome: Met   Evaluation completed 12/14/2023

## 2023-12-14 NOTE — PT/OT/SLP EVAL
Physical Therapy Evaluation and Treatment/Discharge Note    Patient Name:  Hafsa Hawley   MRN:  [id number]    Recommendations:     Discharge Recommendations: No Therapy Indicated  Discharge Equipment Recommendations: none   Barriers to discharge: None    Assessment:     Hafsa Hawley is a 58 y.o. female admitted with a medical diagnosis of Acute decompensated heart failure. .  At this time, patient is functioning at their prior level of function and does not require further acute PT services.     Recent Surgery: * No surgery found *      Plan:     During this hospitalization, patient does not require further acute PT services.  Please re-consult if situation changes.      Subjective     Chief Complaint: none voiced  Patient/Family Comments/goals: to get back home and work again  Pain/Comfort:  Pain Rating 1: 0/10  Pain Addressed 1: Reposition, Distraction  Pain Rating Post-Intervention 1: 0/10    Patients cultural, spiritual, Methodist conflicts given the current situation: no    Living Environment:  Pt lives with  and granddaughter in a trailer with 5STE and BHR. Pt has a tub/shower combo with shower chair. Pt was driving and was off on disability for the past 2 years prior to admit.  Prior to admission, patients level of function was independent.  Equipment at home: shower chair, oxygen, bedside commode, cane, straight, rollator. Upon discharge, patient will have assistance from family.    Objective:     Communicated with RN prior to session.  Patient found up in chair with oxygen, peripheral IV, telemetry upon PT entry to room.    General Precautions: Standard, fall    Orthopedic Precautions:N/A   Braces: N/A  Respiratory Status: Nasal cannula, flow 2 L/min    Exams:  Cognitive Exam:  Patient is oriented to Person, Place, Time, and Situation  RLE ROM: WFL  RLE Strength: WFL  LLE ROM: WFL  LLE Strength: WFL    Functional Mobility:  Transfers:     Sit to Stand:  independence with no AD  Gait: 250  feet with supervision and no AD, pt able to gait train in hallway and reported she was functioning at her baseline, pt with efficient gait speed with no SOB or LOB noted this session, IV pole and oxygen in tow by PT  Stairs:  Pt ascended/descended 4 stair(s) with No Assistive Device with left handrail with Stand-by Assistance.     Pt white board updated with current therapists name and level of mobility assistance needed.     AM-PAC 6 CLICK MOBILITY  Total Score:23       Treatment and Education:  PT verbally educated pt on PT POC with pt verbalizing understanding of such. PT encouraged to sit up in chair during the day and to ambulate multiple times throughout the day to continue to progress with pt agreeable.     AM-PAC 6 CLICK MOBILITY  Total Score:23     Patient left up in chair with all lines intact, call button in reach, and RN notified.    GOALS:   Multidisciplinary Problems       Physical Therapy Goals       Not on file              Multidisciplinary Problems (Resolved)          Problem: Physical Therapy    Goal Priority Disciplines Outcome Goal Variances Interventions   Physical Therapy Goal   (Resolved)     PT, PT/OT Met     Description: Goals to be met by: 12/14/2024     1. PT evaluation to assess functional ability and safety. - met 12/14                             History:     Past Medical History:   Diagnosis Date    Anemia     Arthritis     Atrial fibrillation     OAC    Chronic respiratory failure with hypoxia, on home oxygen therapy     2L with activity, off at rest.  Per Pulm  no overt evidence of ILD or COPD on PFTs and CT to explain O2 needs.    CKD (chronic kidney disease), stage IV 05/08/2018    Congestive heart failure     s/p AICD placement,    Deep vein thrombosis     Depression     elevated bilirubin d/t Gilbert's syndrome     confirmed by Holmen genetic testing, evaluated by hepatology    Encounter for blood transfusion     GERD (gastroesophageal reflux disease)     Hypertension      Pheochromocytoma, malignant     Right kidney mass     Sleep apnea     Thalassemia trait, alpha     Thyroid disease     Type 2 diabetes mellitus with hyperglycemia, without long-term current use of insulin 08/13/2020       Past Surgical History:   Procedure Laterality Date    APPENDECTOMY      BONE MARROW BIOPSY      CARDIAC DEFIBRILLATOR PLACEMENT Left 12/2016    CARDIAC ELECTROPHYSIOLOGY MAPPING AND ABLATION      CARDIAC ELECTROPHYSIOLOGY MAPPING AND ABLATION      COLONOSCOPY N/A 5/6/2022    Procedure: COLONOSCOPY;  Surgeon: Arely Betancourt MD;  Location: [facility] ENDO (2ND FLR);  Service: Endoscopy;  Laterality: N/A;  heart transplant candidate/ EF 25% - 2nd floor/ defib - Biotronik - ERW  Eliquis - per Dr. Cortez with [facility] [place], Pt ok to hold Eliquis x 2 days prior-see media tab-outside correspondence dated 12/30/21  - ERW  verbal instructions/portal instructions/email instructions - s    EYE SURGERY      due to running tears    FRACTURE SURGERY Left     hand 5th digit    HYSTERECTOMY      KNEE SURGERY Left 2016    hematoma    LIVER BIOPSY  10/24/2018    Minimal steatosis, predominantly macrovesicular, 1%, Minimal nonspecific portal inflammation, no fibrosis. No findings on biopsy to explain elevated bilirubin levels. Could be d/t Gilbert's =?- hemolysis    RIGHT HEART CATHETERIZATION Right 12/07/2021    Procedure: INSERTION, CATHETER, RIGHT HEART;  Surgeon: Irving Cardenas MD;  Location: [facility] CATH LAB;  Service: Cardiology;  Laterality: Right;    RIGHT HEART CATHETERIZATION Right 12/19/2022    Procedure: INSERTION, CATHETER, RIGHT HEART;  Surgeon: Burke Camilo MD;  Location: [facility] CATH LAB;  Service: Cardiology;  Laterality: Right;    RIGHT HEART CATHETERIZATION Right 3/29/2023    Procedure: INSERTION, CATHETER, RIGHT HEART;  Surgeon: Katie Liriano DO;  Location: [facility] CATH LAB;  Service: Cardiology;  Laterality: Right;    TRANSJUGULAR BIOPSY OF LIVER N/A 10/24/2018    Procedure: BIOPSY, LIVER,  TRANSJUGULAR APPROACH;  Surgeon: Carmen Diagnostic Provider;  Location: [facility] OR [address] [place] [address];  Service: Radiology;  Laterality: N/A;       Time Tracking:     PT Received On: 12/14/23  PT Start Time: 1309     PT Stop Time: 1325  PT Total Time (min): 16 min     Billable Minutes: Evaluation 8 minutes and Gait Training 8 minutes      12/14/2023

## 2023-12-15 VITALS
RESPIRATION RATE: 18 BRPM | SYSTOLIC BLOOD PRESSURE: 106 MMHG | DIASTOLIC BLOOD PRESSURE: 63 MMHG | HEIGHT: 66 IN | WEIGHT: 195.31 LBS | TEMPERATURE: 97 F | BODY MASS INDEX: 31.39 KG/M2 | OXYGEN SATURATION: 94 % | HEART RATE: 108 BPM

## 2023-12-15 LAB
ALBUMIN SERPL BCP-MCNC: 3.8 G/DL (ref 3.5–5.2)
ALP SERPL-CCNC: 66 U/L (ref 55–135)
ALT SERPL W/O P-5'-P-CCNC: 20 U/L (ref 10–44)
ANION GAP SERPL CALC-SCNC: 14 MMOL/L (ref 8–16)
AST SERPL-CCNC: 22 U/L (ref 10–40)
BASOPHILS # BLD AUTO: 0.03 K/UL (ref 0–0.2)
BASOPHILS NFR BLD: 0.8 % (ref 0–1.9)
BILIRUB SERPL-MCNC: 1.8 MG/DL (ref 0.1–1)
BUN SERPL-MCNC: 120 MG/DL (ref 6–20)
CALCIUM SERPL-MCNC: 9.6 MG/DL (ref 8.7–10.5)
CHLORIDE SERPL-SCNC: 96 MMOL/L (ref 95–110)
CO2 SERPL-SCNC: 28 MMOL/L (ref 23–29)
CREAT SERPL-MCNC: 3 MG/DL (ref 0.5–1.4)
DIFFERENTIAL METHOD: ABNORMAL
EOSINOPHIL # BLD AUTO: 0.1 K/UL (ref 0–0.5)
EOSINOPHIL NFR BLD: 3.3 % (ref 0–8)
ERYTHROCYTE [DISTWIDTH] IN BLOOD BY AUTOMATED COUNT: 31.3 % (ref 11.5–14.5)
EST. GFR  (NO RACE VARIABLE): 17.5 ML/MIN/1.73 M^2
GLUCOSE SERPL-MCNC: 118 MG/DL (ref 70–110)
HCT VFR BLD AUTO: 37.3 % (ref 37–48.5)
HGB BLD-MCNC: 11.6 G/DL (ref 12–16)
IMM GRANULOCYTES # BLD AUTO: 0.02 K/UL (ref 0–0.04)
IMM GRANULOCYTES NFR BLD AUTO: 0.6 % (ref 0–0.5)
LYMPHOCYTES # BLD AUTO: 0.9 K/UL (ref 1–4.8)
LYMPHOCYTES NFR BLD: 25.1 % (ref 18–48)
MAGNESIUM SERPL-MCNC: 2.6 MG/DL (ref 1.6–2.6)
MCH RBC QN AUTO: 19.2 PG (ref 27–31)
MCHC RBC AUTO-ENTMCNC: 31.1 G/DL (ref 32–36)
MCV RBC AUTO: 62 FL (ref 82–98)
MONOCYTES # BLD AUTO: 0.5 K/UL (ref 0.3–1)
MONOCYTES NFR BLD: 12.9 % (ref 4–15)
NEUTROPHILS # BLD AUTO: 2.1 K/UL (ref 1.8–7.7)
NEUTROPHILS NFR BLD: 57.3 % (ref 38–73)
NRBC BLD-RTO: 0 /100 WBC
PLATELET # BLD AUTO: 189 K/UL (ref 150–450)
PMV BLD AUTO: ABNORMAL FL (ref 9.2–12.9)
POTASSIUM SERPL-SCNC: 4 MMOL/L (ref 3.5–5.1)
PROT SERPL-MCNC: 7 G/DL (ref 6–8.4)
RBC # BLD AUTO: 6.05 M/UL (ref 4–5.4)
SODIUM SERPL-SCNC: 138 MMOL/L (ref 136–145)
WBC # BLD AUTO: 3.63 K/UL (ref 3.9–12.7)

## 2023-12-15 PROCEDURE — 25000003 PHARM REV CODE 250: Performed by: STUDENT IN AN ORGANIZED HEALTH CARE EDUCATION/TRAINING PROGRAM

## 2023-12-15 PROCEDURE — 27000221 HC OXYGEN, UP TO 24 HOURS

## 2023-12-15 PROCEDURE — 36415 COLL VENOUS BLD VENIPUNCTURE: CPT

## 2023-12-15 PROCEDURE — 99238 PR HOSPITAL DISCHARGE DAY,<30 MIN: ICD-10-PCS | Mod: ,,, | Performed by: INTERNAL MEDICINE

## 2023-12-15 PROCEDURE — 83735 ASSAY OF MAGNESIUM: CPT

## 2023-12-15 PROCEDURE — 25000003 PHARM REV CODE 250

## 2023-12-15 PROCEDURE — 80053 COMPREHEN METABOLIC PANEL: CPT

## 2023-12-15 PROCEDURE — 63600175 PHARM REV CODE 636 W HCPCS: Performed by: STUDENT IN AN ORGANIZED HEALTH CARE EDUCATION/TRAINING PROGRAM

## 2023-12-15 PROCEDURE — 99238 HOSP IP/OBS DSCHRG MGMT 30/<: CPT | Mod: ,,, | Performed by: INTERNAL MEDICINE

## 2023-12-15 PROCEDURE — 85025 COMPLETE CBC W/AUTO DIFF WBC: CPT

## 2023-12-15 RX ORDER — METOLAZONE 5 MG/1
5 TABLET ORAL DAILY
Qty: 30 TABLET | Refills: 11 | Status: SHIPPED | OUTPATIENT
Start: 2023-12-16 | End: 2024-03-26

## 2023-12-15 RX ADMIN — AMIODARONE HYDROCHLORIDE 200 MG: 200 TABLET ORAL at 08:12

## 2023-12-15 RX ADMIN — BUMETANIDE 4 MG: 1 TABLET ORAL at 08:12

## 2023-12-15 RX ADMIN — SACUBITRIL AND VALSARTAN 1 TABLET: 49; 51 TABLET, FILM COATED ORAL at 08:12

## 2023-12-15 RX ADMIN — DOBUTAMINE IN DEXTROSE 5 MCG/KG/MIN: 400 INJECTION, SOLUTION INTRAVENOUS at 04:12

## 2023-12-15 RX ADMIN — GUAIFENESIN 200 MG: 200 SOLUTION ORAL at 03:12

## 2023-12-15 RX ADMIN — ALLOPURINOL 300 MG: 300 TABLET ORAL at 08:12

## 2023-12-15 RX ADMIN — APIXABAN 5 MG: 5 TABLET, FILM COATED ORAL at 08:12

## 2023-12-15 RX ADMIN — METOLAZONE 5 MG: 5 TABLET ORAL at 08:12

## 2023-12-15 NOTE — PLAN OF CARE
[facility]  Discharge Final Note    Primary Care Provider: Cristobal Ann MD    Expected Discharge Date: 12/15/2023    Final Discharge Note (most recent)       Final Note - 12/15/23 1214          Final Note    Assessment Type Final Discharge Note     Anticipated Discharge Disposition IV Therapy Provider     Hospital Resources/Appts/Education Provided Appointments scheduled and added to AVS        Post-Acute Status    Post-Acute Authorization IV Infusion     [facility] Status Set-up Complete/Auth obtained                 Per Sandra with [facility] pt is hooked back up to her home dobutamine.  LAURIE Sepulveda and CCU team notified.  Per CCU team the only follow up appt needed for pt is with [facility], who will schedule their own appt.      Lourdes Elder, LMSW [facility]  [phone]      Future Appointments   Date Time Provider Department Center   12/18/2023 11:30 AM CHAIR 03 STAH ST[facility] CHEMO [facility]   12/27/2023 12:30 PM CHAIR 03 STAH [facility] CHE[facility]   1/9/2024  1:00 PM Sandra Hernandes MD [facility] PAL MED [facility]   2/5/2024  1:00 PM Terese Patel MD [facility] RHEUM [place]   4/11/2024  1:00 PM Cyndee Mattson MD [facility] PULM [place] [facility]   4/15/2024 10:00 AM LAB, Located within [facility] STAH LAB [facility]   4/22/2024  1:00 PM Vijay Basurto MD [facility] NEPHRO [place] [facility]   5/8/2024  2:00 PM Uziel Herman MD [facility] ENDOCR [place]         Important Message from Medicare                  done

## 2023-12-15 NOTE — CHAPLAIN
"Palliative Care     Patient: Hafsa Hawley  MRN: [id number]  : 1965  Age: 58 y.o.  Hospital Length of Stay: 7  Code Status: Code Status Discussion Note  Attending Provider: Althea Escalante MD  Principal Problem: Acute decompensated heart failure    Followed up with OP clinic palliative pt via telephone, not realizing she was admitted, although she's being d/c today.    Pt lamented how frustrated she was with her ER exp at [facility] who sent her home with no help, in patient's opinion-- treated like she was there just for pain meds.  Pt said she couldn't walk, had breathing issues, abdom pain, vomiting and nausea.    Provided listening ear for patient-- said that ER experience resulted in her crying all that day, feeling depressed and mistreated.     She is feeling a little better now after a week-long admit here. Pt stated "they keep telling me I need hospice--- but I can still take care of myself, I have all the equipment I need." I explained hospice services and said often that may be suggested if there is no more invasive procedures or treatments and will focus just on your comfort---reduce appts and ER visits, unless it's some acute in another area of her body-- like she was hospitalized couple months ago for gout flare up. Pt understands better now and knows hospice will be the next step for her, but she's not ready now.    Shared this conversation and admit with the palliative OP clinic team. Pt has a scheduled follow up in clinic on 24.      Future (Spiritual Care Plan of Action):  Pt will continue to follow pt via telephone and in palliative clinic. Lord, in your mercy       In Peace,  Rev. Krista Glass MDiv, [facility]  Board Certified   Palliative Medicine Department  [phone]  "

## 2023-12-15 NOTE — DISCHARGE SUMMARY
[facility] Stepdown  Cardiology  Discharge Summary      Patient Name: Hafsa Hawley  MRN: [id number]  Admission Date: 12/8/2023  Hospital Length of Stay: 7 days  Discharge Date and Time:  12/15/2023 2:41 PM  Attending Physician: Althea Escalante MD    Discharging Provider: Galo Russ MD  Primary Care Physician: Cristobal Ann MD    HPI:   patient is a 58 y.o female with h/o end stage niCM with EF 10%, not a candidate for advanced options, on palliative home  who presents as transfer from [facility] ED for ADHF. She initially presented to the OSH for worsening SOB.   on arrival to [facility] ED labs were signficant for cr 2.4 (bl  ), BNP was noted to be 3000, Trop 1.036, procal 0.35.  on arrival to C she was found to be hemodynamically stable. Denies cp/chest discomfort but does report sob/iniguez, pnd.    TTE on 3/27/23 with EF 15%, PASP 51 mmhg    RHC on 3/29/23 with following findings:  Filling pressures: RA 9, PCWP 16  PA 58/29, mean PA pressure 39 mmHg  PA saturation 45%, Ao saturation 90% on RA  PVR 5.6 BOLDEN, consistent with pre and post-capillary pulmonary hypertension  Gillian CO/CI: 4.1/2.03  SVR: 1151  BP 88/58 , MAP 68, HR 75 SR  Hb 10.7  Jayson 3.2      * No surgery found *     Indwelling Lines/Drains at time of discharge:  Lines/Drains/Airways       Peripherally Inserted Central Catheter Line  Duration             PICC Double Lumen 05/12/23 1534 right brachial 217 days                    Hospital Course:  Patient admitted to CCU for management of acute decompensated end-stage HF. Patient continued on home  dose of 5. Started on lasix gtt and diuresing well. Continuing home entresto while admitted. Stopped BB given patient on dobutamine. Patient continues to diurese well. Added metolazone on 12/12 with improved diuresis. Transitioned to home bumetanide and daily metolazone. Patient stable for discharge to home with close cardiology follow up on 12/15.     Goals of Care Treatment  Preferences:  Code Status: Full Code    Health care agent: Erika Estrada  [facility] care agent number: No value filed.          What is most important right now is to focus on remaining as independent as possible, symptom/pain control, curative/life-prolongation (regardless of treatment burdens).  Accordingly, we have decided that the best plan to meet the patient's goals includes continuing with treatment.      Consults:   Consults (From admission, onward)          Status Ordering Provider     IP consult to case management  Once        Provider:  (Not yet assigned)    KRISTINA Whiting            Significant Diagnostic Studies:     X-Ray Chest 1 View   Final Result      Continued demonstration of cardiomegaly, but there has been no significant detrimental interval change in the appearance of the chest since 12/13/2023 at 11:14.         Electronically signed by: Kenneth Marina MD   Date:    12/14/2023   Time:    09:02      X-Ray Chest 1 View   Final Result      No significant changes         Electronically signed by: Kenneth Calloway MD   Date:    12/13/2023   Time:    12:09      X-Ray Chest AP Portable   Final Result      See above         Electronically signed by: Cristobal Hsu MD   Date:    12/11/2023   Time:    07:05            Pending Diagnostic Studies:       None            Final Active Diagnoses:    Diagnosis Date Noted POA    PRINCIPAL PROBLEM:  Acute decompensated heart failure [I50.9] 11/18/2022 Yes    Paroxysmal A-fib [I48.0] 06/28/2023 Yes    Pulmonary hypertension [I27.20] 02/22/2023 Yes    CKD (chronic kidney disease), stage IV [N18.4] 08/23/2022 Yes     Chronic    Type 2 diabetes mellitus with stage 4 chronic kidney disease, without long-term current use of insulin [E11.22, N18.4] 08/13/2020 Yes    Essential hypertension [I10] 07/22/2016 Yes     Chronic    Microcytic anemia [D50.9] 07/20/2016 Yes     Chronic      Problems Resolved During this Admission:     No new Assessment & Plan notes  have been filed under this hospital service since the last note was generated.  Service: Cardiology      Discharged Condition: stable    Disposition: Home or Self Care    Follow Up:    Patient Instructions:      Ambulatory referral/consult to [facility]   Standing Status: Future   Referral Priority: Routine Referral Type: Consultation   Referral Reason: Specialty Services Required   Requested Specialty: Cardiology   Number of Visits Requested: 1     Medications:  Reconciled Home Medications:      Medication List        CHANGE how you take these medications      DULERA 200-5 mcg/actuation inhaler  Generic drug: mometasone-formoterol  Inhale 2 puffs into the lungs 2 (two) times daily. Controller  What changed: Another medication with the same name was removed. Continue taking this medication, and follow the directions you see here.     metOLazone 5 MG tablet  Commonly known as: ZAROXOLYN  Take 1 tablet (5 mg total) by mouth once daily.  Start taking on: December 16, 2023  What changed: when to take this     MOUNJARO 2.5 mg/0.5 mL Pnij  Generic drug: tirzepatide  Inject into the skin.  What changed: Another medication with the same name was removed. Continue taking this medication, and follow the directions you see here.     SPIRIVA WITH HANDIHALER 18 mcg inhalation capsule  Generic drug: tiotropium  Inhale 1 capsule (18 mcg total) into the lungs once daily.  What changed: Another medication with the same name was removed. Continue taking this medication, and follow the directions you see here.     VENTOLIN HFA 90 mcg/actuation inhaler  Generic drug: albuterol  Inhale 2 puffs into the lungs every 6 (six) hours as needed for Shortness of Breath or Wheezing.  What changed: Another medication with the same name was removed. Continue taking this medication, and follow the directions you see here.            CONTINUE taking these medications      albuterol-ipratropium 2.5 mg-0.5 mg/3 mL nebulizer  solution  Commonly known as: DUO-NEB  Take 3 mLs by nebulization every 6 (six) hours as needed for Wheezing or Shortness of Breath.     allopurinoL 300 MG tablet  Commonly known as: ZYLOPRIM  Take 1 tablet (300 mg total) by mouth once daily.     ALPRAZolam 2 MG Tab  Commonly known as: XANAX  Take 1 tablet (2 mg total) by mouth 2 (two) times daily.     amiodarone 200 MG Tab  Commonly known as: PACERONE  Take 1 tablet (200 mg total) by mouth once daily.     atorvastatin 40 MG tablet  Commonly known as: LIPITOR  Take 1 tablet (40 mg total) by mouth every evening.     b complex vitamins tablet  Take 1 tablet by mouth once daily.     bumetanide 2 MG tablet  Commonly known as: BUMEX  Take 2 tablets (4mg total) by mouth in morning and take 1 tablet (2mg total) by mouth in the evening (no later then 5pm).     cetirizine 5 MG tablet  Commonly known as: ZYRTEC  Take 1 tablet (5 mg total) by mouth daily as needed for Allergies.     * DEXCOM G7 SENSOR Evon  Generic drug: blood-glucose sensor  change every 10 days     * DEXCOM G7 SENSOR Evon  Generic drug: blood-glucose sensor  change sensor every 10 days.     diclofenac sodium 1 % Gel  Commonly known as: VOLTAREN  Apply 2 g topically 3 (three) times daily as needed for pain.     DOBUTamine 1,000 mg/250 mL (4,000 mcg/mL) infusion  Commonly known as: DOBUTREX  Inject 409 mcg/min into the vein continuous.     ELIQUIS 5 mg Tab  Generic drug: apixaban  Take 1 tablet (5 mg total) by mouth 2 (two) times a day.     ENTRESTO 49-51 mg per tablet  Generic drug: sacubitriL-valsartan  Take 1 tablet by mouth 2 (two) times a day.     febuxostat 40 mg Tab  Commonly known as: ULORIC  Take 40 mg by mouth once daily.     ferrous sulfate 325 mg (65 mg iron) Tab tablet  Commonly known as: FEOSOL  Take 1 tablet (325 mg total) by mouth 3 (three) times daily.     fluticasone propionate 50 mcg/actuation nasal spray  Commonly known as: FLONASE  2 sprays by Each Nostril route daily as needed for  Rhinitis.     glimepiride 2 MG tablet  Commonly known as: AMARYL  Take 1 tablet (2 mg total) by mouth once daily.     HYDROcodone-acetaminophen  mg per tablet  Commonly known as: NORCO  Take 1 tablet by mouth every 6 (six) hours as needed for Pain.     isosorbide-hydrALAZINE 20-37.5 mg 20-37.5 mg Tab  Commonly known as: BIDIL  Take by mouth.     JARDIANCE 25 mg tablet  Generic drug: empagliflozin  Take 25 mg by mouth.     LIDOcaine 5 %  Commonly known as: LIDODERM  Place 1 patch onto the skin once daily. Remove & discard patch within 12 hours or as directed by MD.     * ondansetron 8 MG Tbdl  Commonly known as: ZOFRAN-ODT  Take 1 tablet (8 mg total) by mouth every 8 (eight) hours as needed.     * ondansetron 4 MG Tbdl  Commonly known as: ZOFRAN-ODT  Take 4 mg by mouth every 8 (eight) hours as needed.     ONETOUCH DELICA PLUS LANCET 30 gauge Misc  Generic drug: lancets  by Misc.(Non-Drug; Combo Route) route 3 (three) times daily.     ONETOUCH ULTRA TEST Strp  Generic drug: blood sugar diagnostic  1 strip by Other route 3 (three) times daily.     OZEMPIC 0.25 mg or 0.5 mg (2 mg/3 mL) pen injector  Generic drug: semaglutide  Inject 0.5 mg into the skin every 7 days.     pantoprazole 40 MG tablet  Commonly known as: PROTONIX  TAKE 1 TABLET BY MOUTH DAILY IN THE MORNING     predniSONE 5 MG tablet  Commonly known as: DELTASONE  Take 1 tablet (5 mg total) by mouth once daily.     pregabalin 50 MG capsule  Commonly known as: LYRICA  Take 1 capsule (50 mg total) by mouth every evening.     probenecid 500 mg Tab  Commonly known as: BENEMID  Take 1/2 tablet by mouth twice daily.     promethazine 25 MG suppository  Commonly known as: PHENERGAN  Place 1 suppository (25 mg total) rectally every 6 (six) hours as needed for nausea.     promethazine-codeine 6.25-10 mg/5 ml 6.25-10 mg/5 mL syrup  Commonly known as: PHENERGAN with CODEINE  Take 5 mL orally every 6 hours as needed.     scopolamine 1.3-1.5 mg (1 mg over 3  days)  Commonly known as: TRANSDERM-SCOP  Place 1 patch onto the skin every 72 hours as needed for nausea.     VITAMIN D2 50,000 unit Cap  Generic drug: ergocalciferol  Take 1 capsule (50,000 Units total) by mouth once a week.           * This list has 4 medication(s) that are the same as other medications prescribed for you. Read the directions carefully, and ask your doctor or other care provider to review them with you.                STOP taking these medications      acetaZOLAMIDE 250 MG tablet  Commonly known as: DIAMOX     furosemide 40 MG tablet  Commonly known as: LASIX     metoprolol succinate 50 MG 24 hr tablet  Commonly known as: TOPROL-XL              Time spent on the discharge of patient: 45 minutes    Galo Russ MD  Cardiology  [facility] - Cardiology Stepdown

## 2023-12-15 NOTE — PROGRESS NOTES
Discharge in good condition with discharge instructions provided to her. Dobutamine infusion home equipment is all set up before discharge.

## 2023-12-18 ENCOUNTER — INFUSION (OUTPATIENT)
Dept: INFUSION THERAPY | Facility: HOSPITAL | Age: 58
End: 2023-12-18
Attending: INTERNAL MEDICINE
Payer: MEDICAID

## 2023-12-18 VITALS
DIASTOLIC BLOOD PRESSURE: 69 MMHG | RESPIRATION RATE: 22 BRPM | TEMPERATURE: 97 F | SYSTOLIC BLOOD PRESSURE: 94 MMHG | HEART RATE: 98 BPM

## 2023-12-18 DIAGNOSIS — I42.9 CARDIOMYOPATHY, UNSPECIFIED TYPE: Primary | ICD-10-CM

## 2023-12-18 LAB
ALBUMIN SERPL BCP-MCNC: 4.2 G/DL (ref 3.5–5.2)
ALP SERPL-CCNC: 61 U/L (ref 55–135)
ALT SERPL W/O P-5'-P-CCNC: 21 U/L (ref 10–44)
ANION GAP SERPL CALC-SCNC: 15 MMOL/L (ref 8–16)
ANISOCYTOSIS BLD QL SMEAR: ABNORMAL
AST SERPL-CCNC: 22 U/L (ref 10–40)
BASOPHILS # BLD AUTO: 0.03 K/UL (ref 0–0.2)
BASOPHILS NFR BLD: 0.8 % (ref 0–1.9)
BILIRUB SERPL-MCNC: 1.9 MG/DL (ref 0.1–1)
BNP SERPL-MCNC: 411 PG/ML (ref 0–99)
BUN SERPL-MCNC: 140 MG/DL (ref 6–20)
CALCIUM SERPL-MCNC: 9.9 MG/DL (ref 8.7–10.5)
CHLORIDE SERPL-SCNC: 95 MMOL/L (ref 95–110)
CO2 SERPL-SCNC: 29 MMOL/L (ref 23–29)
CREAT SERPL-MCNC: 4.1 MG/DL (ref 0.5–1.4)
DACRYOCYTES BLD QL SMEAR: ABNORMAL
DIFFERENTIAL METHOD: ABNORMAL
EOSINOPHIL # BLD AUTO: 0.1 K/UL (ref 0–0.5)
EOSINOPHIL NFR BLD: 2.6 % (ref 0–8)
ERYTHROCYTE [DISTWIDTH] IN BLOOD BY AUTOMATED COUNT: 31.7 % (ref 11.5–14.5)
EST. GFR  (NO RACE VARIABLE): 12 ML/MIN/1.73 M^2
GLUCOSE SERPL-MCNC: 175 MG/DL (ref 70–110)
HCT VFR BLD AUTO: 38.6 % (ref 37–48.5)
HGB BLD-MCNC: 11.3 G/DL (ref 12–16)
HYPOCHROMIA BLD QL SMEAR: ABNORMAL
IMM GRANULOCYTES # BLD AUTO: 0.01 K/UL (ref 0–0.04)
IMM GRANULOCYTES NFR BLD AUTO: 0.3 % (ref 0–0.5)
LYMPHOCYTES # BLD AUTO: 0.6 K/UL (ref 1–4.8)
LYMPHOCYTES NFR BLD: 15.7 % (ref 18–48)
MAGNESIUM SERPL-MCNC: 2.5 MG/DL (ref 1.6–2.6)
MCH RBC QN AUTO: 17.3 PG (ref 27–31)
MCHC RBC AUTO-ENTMCNC: 29.3 G/DL (ref 32–36)
MCV RBC AUTO: 59 FL (ref 82–98)
MONOCYTES # BLD AUTO: 0.5 K/UL (ref 0.3–1)
MONOCYTES NFR BLD: 12.3 % (ref 4–15)
NEUTROPHILS # BLD AUTO: 2.6 K/UL (ref 1.8–7.7)
NEUTROPHILS NFR BLD: 68.3 % (ref 38–73)
NRBC BLD-RTO: 0 /100 WBC
OVALOCYTES BLD QL SMEAR: ABNORMAL
PHOSPHATE SERPL-MCNC: 6.6 MG/DL (ref 2.7–4.5)
PLATELET # BLD AUTO: 147 K/UL (ref 150–450)
PLATELET BLD QL SMEAR: ABNORMAL
PMV BLD AUTO: ABNORMAL FL (ref 9.2–12.9)
POTASSIUM SERPL-SCNC: 4 MMOL/L (ref 3.5–5.1)
PROT SERPL-MCNC: 7.6 G/DL (ref 6–8.4)
RBC # BLD AUTO: 6.52 M/UL (ref 4–5.4)
SCHISTOCYTES BLD QL SMEAR: ABNORMAL
SCHISTOCYTES BLD QL SMEAR: PRESENT
SODIUM SERPL-SCNC: 139 MMOL/L (ref 136–145)
SPHEROCYTES BLD QL SMEAR: ABNORMAL
TARGETS BLD QL SMEAR: ABNORMAL
WBC # BLD AUTO: 3.83 K/UL (ref 3.9–12.7)

## 2023-12-18 PROCEDURE — 80053 COMPREHEN METABOLIC PANEL: CPT | Performed by: INTERNAL MEDICINE

## 2023-12-18 PROCEDURE — 84100 ASSAY OF PHOSPHORUS: CPT | Performed by: INTERNAL MEDICINE

## 2023-12-18 PROCEDURE — 83735 ASSAY OF MAGNESIUM: CPT | Performed by: INTERNAL MEDICINE

## 2023-12-18 PROCEDURE — 83880 ASSAY OF NATRIURETIC PEPTIDE: CPT | Performed by: INTERNAL MEDICINE

## 2023-12-18 PROCEDURE — 36591 DRAW BLOOD OFF VENOUS DEVICE: CPT

## 2023-12-18 PROCEDURE — 85025 COMPLETE CBC W/AUTO DIFF WBC: CPT | Performed by: INTERNAL MEDICINE

## 2023-12-18 NOTE — PROGRESS NOTES
12pm blood drawn from picc line and sterile dressing change to picc.  Biopatch, tegaderm, and stat lock re-applied.  Dc'd to home. Stable.

## 2023-12-19 ENCOUNTER — EPISODE CHANGES (OUTPATIENT)
Dept: CARDIOLOGY | Facility: CLINIC | Age: 58
End: 2023-12-19

## 2023-12-19 ENCOUNTER — DOCUMENTATION ONLY (OUTPATIENT)
Dept: CARDIOLOGY | Facility: CLINIC | Age: 58
End: 2023-12-19
Payer: MEDICAID

## 2023-12-19 NOTE — PROGRESS NOTES
[facility] ([facility]) Team notified of pt referral via Ambulatory Referral to Heart Failure Transitional Care ([id number]).    Patient screened today 12- by provider and RN for enrollment to program.      Pt was deemed not a candidate for enrollment at this time related to patient is [place] [facility] [place]     Pt will require additional follow up planning per primary team.     If pt status, diagnosis, or treatment plan changes , please place AMB referral to [facility] ([id number]) for [facility] enrollment re-evalution.

## 2023-12-27 ENCOUNTER — INFUSION (OUTPATIENT)
Dept: INFUSION THERAPY | Facility: HOSPITAL | Age: 58
End: 2023-12-27
Attending: INTERNAL MEDICINE
Payer: MEDICAID

## 2023-12-27 VITALS
DIASTOLIC BLOOD PRESSURE: 73 MMHG | SYSTOLIC BLOOD PRESSURE: 109 MMHG | TEMPERATURE: 98 F | RESPIRATION RATE: 18 BRPM | HEART RATE: 102 BPM

## 2023-12-27 DIAGNOSIS — I42.9 CARDIOMYOPATHY, UNSPECIFIED TYPE: Primary | ICD-10-CM

## 2023-12-27 LAB
ALBUMIN SERPL BCP-MCNC: 3.9 G/DL (ref 3.5–5.2)
ALP SERPL-CCNC: 61 U/L (ref 55–135)
ALT SERPL W/O P-5'-P-CCNC: 16 U/L (ref 10–44)
ANION GAP SERPL CALC-SCNC: 12 MMOL/L (ref 8–16)
ANISOCYTOSIS BLD QL SMEAR: ABNORMAL
AST SERPL-CCNC: 23 U/L (ref 10–40)
BASOPHILS # BLD AUTO: ABNORMAL K/UL (ref 0–0.2)
BASOPHILS NFR BLD: 0 % (ref 0–1.9)
BILIRUB SERPL-MCNC: 1.4 MG/DL (ref 0.1–1)
BNP SERPL-MCNC: 1570 PG/ML (ref 0–99)
BUN SERPL-MCNC: 75 MG/DL (ref 6–20)
CALCIUM SERPL-MCNC: 9.8 MG/DL (ref 8.7–10.5)
CHLORIDE SERPL-SCNC: 104 MMOL/L (ref 95–110)
CO2 SERPL-SCNC: 25 MMOL/L (ref 23–29)
CREAT SERPL-MCNC: 2.8 MG/DL (ref 0.5–1.4)
DACRYOCYTES BLD QL SMEAR: ABNORMAL
DIFFERENTIAL METHOD: ABNORMAL
EOSINOPHIL # BLD AUTO: ABNORMAL K/UL (ref 0–0.5)
EOSINOPHIL NFR BLD: 0 % (ref 0–8)
ERYTHROCYTE [DISTWIDTH] IN BLOOD BY AUTOMATED COUNT: 30.5 % (ref 11.5–14.5)
EST. GFR  (NO RACE VARIABLE): 19 ML/MIN/1.73 M^2
GIANT PLATELETS BLD QL SMEAR: PRESENT
GLUCOSE SERPL-MCNC: 112 MG/DL (ref 70–110)
HCT VFR BLD AUTO: 36 % (ref 37–48.5)
HGB BLD-MCNC: 10.5 G/DL (ref 12–16)
HYPOCHROMIA BLD QL SMEAR: ABNORMAL
IMM GRANULOCYTES # BLD AUTO: ABNORMAL K/UL (ref 0–0.04)
IMM GRANULOCYTES NFR BLD AUTO: ABNORMAL %
LYMPHOCYTES # BLD AUTO: ABNORMAL K/UL (ref 1–4.8)
LYMPHOCYTES NFR BLD: 0 % (ref 18–48)
MAGNESIUM SERPL-MCNC: 1.8 MG/DL (ref 1.6–2.6)
MCH RBC QN AUTO: 16.9 PG (ref 27–31)
MCHC RBC AUTO-ENTMCNC: 29.2 G/DL (ref 32–36)
MCV RBC AUTO: 58 FL (ref 82–98)
MONOCYTES # BLD AUTO: ABNORMAL K/UL (ref 0.3–1)
MONOCYTES NFR BLD: 0 % (ref 4–15)
NEUTROPHILS NFR BLD: 0 % (ref 38–73)
NRBC BLD-RTO: 0 /100 WBC
OVALOCYTES BLD QL SMEAR: ABNORMAL
PHOSPHATE SERPL-MCNC: 3.8 MG/DL (ref 2.7–4.5)
PLATELET # BLD AUTO: 145 K/UL (ref 150–450)
PLATELET BLD QL SMEAR: ABNORMAL
PMV BLD AUTO: ABNORMAL FL (ref 9.2–12.9)
POIKILOCYTOSIS BLD QL SMEAR: ABNORMAL
POLYCHROMASIA BLD QL SMEAR: ABNORMAL
POTASSIUM SERPL-SCNC: 4.1 MMOL/L (ref 3.5–5.1)
PROT SERPL-MCNC: 7.2 G/DL (ref 6–8.4)
RBC # BLD AUTO: 6.2 M/UL (ref 4–5.4)
SCHISTOCYTES BLD QL SMEAR: ABNORMAL
SCHISTOCYTES BLD QL SMEAR: PRESENT
SODIUM SERPL-SCNC: 141 MMOL/L (ref 136–145)
TARGETS BLD QL SMEAR: ABNORMAL
WBC # BLD AUTO: 3.69 K/UL (ref 3.9–12.7)

## 2023-12-27 PROCEDURE — 36415 COLL VENOUS BLD VENIPUNCTURE: CPT | Performed by: INTERNAL MEDICINE

## 2023-12-27 PROCEDURE — 80053 COMPREHEN METABOLIC PANEL: CPT | Performed by: INTERNAL MEDICINE

## 2023-12-27 PROCEDURE — 83880 ASSAY OF NATRIURETIC PEPTIDE: CPT | Performed by: INTERNAL MEDICINE

## 2023-12-27 PROCEDURE — 36591 DRAW BLOOD OFF VENOUS DEVICE: CPT | Mod: 59

## 2023-12-27 PROCEDURE — 85025 COMPLETE CBC W/AUTO DIFF WBC: CPT | Performed by: INTERNAL MEDICINE

## 2023-12-27 PROCEDURE — 84100 ASSAY OF PHOSPHORUS: CPT | Performed by: INTERNAL MEDICINE

## 2023-12-27 PROCEDURE — 83735 ASSAY OF MAGNESIUM: CPT | Performed by: INTERNAL MEDICINE

## 2023-12-27 NOTE — PROGRESS NOTES
12:45pm blood drawn from picc line and sterile dressing change to picc.  Biopatch, tegaderm, and stat lock re-applied.  Dc'd to home. Stable.

## 2024-01-01 ENCOUNTER — TELEPHONE (OUTPATIENT)
Dept: TRANSPLANT | Facility: CLINIC | Age: 59
End: 2024-01-01

## 2024-01-01 ENCOUNTER — TELEPHONE (OUTPATIENT)
Dept: FAMILY MEDICINE | Facility: CLINIC | Age: 59
End: 2024-01-01

## 2024-01-03 ENCOUNTER — INFUSION (OUTPATIENT)
Dept: INFUSION THERAPY | Facility: HOSPITAL | Age: 59
End: 2024-01-03
Attending: INTERNAL MEDICINE
Payer: MEDICAID

## 2024-01-03 VITALS
DIASTOLIC BLOOD PRESSURE: 70 MMHG | TEMPERATURE: 97 F | RESPIRATION RATE: 20 BRPM | SYSTOLIC BLOOD PRESSURE: 106 MMHG | HEART RATE: 100 BPM

## 2024-01-03 DIAGNOSIS — I42.9 CARDIOMYOPATHY, UNSPECIFIED TYPE: Primary | ICD-10-CM

## 2024-01-03 LAB
ALBUMIN SERPL BCP-MCNC: 4 G/DL (ref 3.5–5.2)
ALP SERPL-CCNC: 60 U/L (ref 55–135)
ALT SERPL W/O P-5'-P-CCNC: 15 U/L (ref 10–44)
ANION GAP SERPL CALC-SCNC: 13 MMOL/L (ref 8–16)
ANISOCYTOSIS BLD QL SMEAR: ABNORMAL
AST SERPL-CCNC: 22 U/L (ref 10–40)
BASOPHILS # BLD AUTO: ABNORMAL K/UL (ref 0–0.2)
BASOPHILS NFR BLD: 0 % (ref 0–1.9)
BILIRUB SERPL-MCNC: 1.5 MG/DL (ref 0.1–1)
BNP SERPL-MCNC: 505 PG/ML (ref 0–99)
BUN SERPL-MCNC: 76 MG/DL (ref 6–20)
CALCIUM SERPL-MCNC: 9.9 MG/DL (ref 8.7–10.5)
CHLORIDE SERPL-SCNC: 101 MMOL/L (ref 95–110)
CO2 SERPL-SCNC: 25 MMOL/L (ref 23–29)
CREAT SERPL-MCNC: 2.9 MG/DL (ref 0.5–1.4)
DACRYOCYTES BLD QL SMEAR: ABNORMAL
DIFFERENTIAL METHOD BLD: ABNORMAL
EOSINOPHIL # BLD AUTO: ABNORMAL K/UL (ref 0–0.5)
EOSINOPHIL NFR BLD: 2 % (ref 0–8)
ERYTHROCYTE [DISTWIDTH] IN BLOOD BY AUTOMATED COUNT: 28.6 % (ref 11.5–14.5)
EST. GFR  (NO RACE VARIABLE): 18 ML/MIN/1.73 M^2
GIANT PLATELETS BLD QL SMEAR: PRESENT
GLUCOSE SERPL-MCNC: 131 MG/DL (ref 70–110)
HCT VFR BLD AUTO: 34.9 % (ref 37–48.5)
HGB BLD-MCNC: 10.9 G/DL (ref 12–16)
HYPOCHROMIA BLD QL SMEAR: ABNORMAL
IMM GRANULOCYTES # BLD AUTO: ABNORMAL K/UL (ref 0–0.04)
IMM GRANULOCYTES NFR BLD AUTO: ABNORMAL % (ref 0–0.5)
LYMPHOCYTES # BLD AUTO: ABNORMAL K/UL (ref 1–4.8)
LYMPHOCYTES NFR BLD: 23 % (ref 18–48)
MAGNESIUM SERPL-MCNC: 1.8 MG/DL (ref 1.6–2.6)
MCH RBC QN AUTO: 18.2 PG (ref 27–31)
MCHC RBC AUTO-ENTMCNC: 31.2 G/DL (ref 32–36)
MCV RBC AUTO: 58 FL (ref 82–98)
MONOCYTES # BLD AUTO: ABNORMAL K/UL (ref 0.3–1)
MONOCYTES NFR BLD: 7 % (ref 4–15)
NEUTROPHILS # BLD AUTO: ABNORMAL K/UL (ref 1.8–7.7)
NEUTROPHILS NFR BLD: 68 % (ref 38–73)
NRBC BLD-RTO: 0 /100 WBC
PHOSPHATE SERPL-MCNC: 3.7 MG/DL (ref 2.7–4.5)
PLATELET # BLD AUTO: 152 K/UL (ref 150–450)
PLATELET BLD QL SMEAR: ABNORMAL
PMV BLD AUTO: ABNORMAL FL (ref 9.2–12.9)
POIKILOCYTOSIS BLD QL SMEAR: ABNORMAL
POLYCHROMASIA BLD QL SMEAR: ABNORMAL
POTASSIUM SERPL-SCNC: 3.7 MMOL/L (ref 3.5–5.1)
PROT SERPL-MCNC: 7.5 G/DL (ref 6–8.4)
RBC # BLD AUTO: 5.98 M/UL (ref 4–5.4)
SCHISTOCYTES BLD QL SMEAR: ABNORMAL
SCHISTOCYTES BLD QL SMEAR: PRESENT
SODIUM SERPL-SCNC: 139 MMOL/L (ref 136–145)
SPHEROCYTES BLD QL SMEAR: ABNORMAL
TARGETS BLD QL SMEAR: ABNORMAL
TOXIC GRANULES BLD QL SMEAR: ABNORMAL
WBC # BLD AUTO: 3.76 K/UL (ref 3.9–12.7)

## 2024-01-03 PROCEDURE — 80053 COMPREHEN METABOLIC PANEL: CPT | Performed by: INTERNAL MEDICINE

## 2024-01-03 PROCEDURE — 36591 DRAW BLOOD OFF VENOUS DEVICE: CPT

## 2024-01-03 PROCEDURE — 85025 COMPLETE CBC W/AUTO DIFF WBC: CPT | Performed by: INTERNAL MEDICINE

## 2024-01-03 PROCEDURE — 83735 ASSAY OF MAGNESIUM: CPT | Performed by: INTERNAL MEDICINE

## 2024-01-03 PROCEDURE — 83880 ASSAY OF NATRIURETIC PEPTIDE: CPT | Performed by: INTERNAL MEDICINE

## 2024-01-03 PROCEDURE — 84100 ASSAY OF PHOSPHORUS: CPT | Performed by: INTERNAL MEDICINE

## 2024-01-03 NOTE — PROGRESS NOTES
1322  Blood drawn from PICC line  Tolerated well  Flushed with saline  Pt will flush with her home heparin.

## 2024-01-03 NOTE — PROGRESS NOTES
1330  Right upper arm PICC line dressing change done using sterile technique.  Tolerated well  No signs of infection noted.  Stat lock device, bio patch and tegaderm dressing reapplied.

## 2024-01-08 ENCOUNTER — INFUSION (OUTPATIENT)
Dept: INFUSION THERAPY | Facility: HOSPITAL | Age: 59
End: 2024-01-08
Attending: INTERNAL MEDICINE
Payer: MEDICAID

## 2024-01-08 VITALS
SYSTOLIC BLOOD PRESSURE: 131 MMHG | DIASTOLIC BLOOD PRESSURE: 76 MMHG | RESPIRATION RATE: 20 BRPM | HEART RATE: 97 BPM | TEMPERATURE: 98 F

## 2024-01-08 DIAGNOSIS — I42.9 CARDIOMYOPATHY, UNSPECIFIED TYPE: Primary | ICD-10-CM

## 2024-01-08 LAB
ALBUMIN SERPL BCP-MCNC: 3.7 G/DL (ref 3.5–5.2)
ALP SERPL-CCNC: 64 U/L (ref 55–135)
ALT SERPL W/O P-5'-P-CCNC: 18 U/L (ref 10–44)
ANION GAP SERPL CALC-SCNC: 15 MMOL/L (ref 8–16)
ANISOCYTOSIS BLD QL SMEAR: ABNORMAL
AST SERPL-CCNC: 23 U/L (ref 10–40)
BASOPHILS # BLD AUTO: 0.02 K/UL (ref 0–0.2)
BASOPHILS NFR BLD: 0.5 % (ref 0–1.9)
BILIRUB SERPL-MCNC: 1.4 MG/DL (ref 0.1–1)
BNP SERPL-MCNC: 354 PG/ML (ref 0–99)
BUN SERPL-MCNC: 69 MG/DL (ref 6–20)
CALCIUM SERPL-MCNC: 9.2 MG/DL (ref 8.7–10.5)
CHLORIDE SERPL-SCNC: 103 MMOL/L (ref 95–110)
CO2 SERPL-SCNC: 23 MMOL/L (ref 23–29)
CREAT SERPL-MCNC: 2.7 MG/DL (ref 0.5–1.4)
DACRYOCYTES BLD QL SMEAR: ABNORMAL
DIFFERENTIAL METHOD BLD: ABNORMAL
EOSINOPHIL # BLD AUTO: 0.2 K/UL (ref 0–0.5)
EOSINOPHIL NFR BLD: 4.1 % (ref 0–8)
ERYTHROCYTE [DISTWIDTH] IN BLOOD BY AUTOMATED COUNT: 28.5 % (ref 11.5–14.5)
EST. GFR  (NO RACE VARIABLE): 20 ML/MIN/1.73 M^2
GLUCOSE SERPL-MCNC: 129 MG/DL (ref 70–110)
HCT VFR BLD AUTO: 33.4 % (ref 37–48.5)
HGB BLD-MCNC: 9.9 G/DL (ref 12–16)
HYPOCHROMIA BLD QL SMEAR: ABNORMAL
IMM GRANULOCYTES # BLD AUTO: 0.01 K/UL (ref 0–0.04)
IMM GRANULOCYTES NFR BLD AUTO: 0.3 % (ref 0–0.5)
LYMPHOCYTES # BLD AUTO: 0.5 K/UL (ref 1–4.8)
LYMPHOCYTES NFR BLD: 13.9 % (ref 18–48)
MAGNESIUM SERPL-MCNC: 1.6 MG/DL (ref 1.6–2.6)
MCH RBC QN AUTO: 17.5 PG (ref 27–31)
MCHC RBC AUTO-ENTMCNC: 29.6 G/DL (ref 32–36)
MCV RBC AUTO: 59 FL (ref 82–98)
MONOCYTES # BLD AUTO: 0.3 K/UL (ref 0.3–1)
MONOCYTES NFR BLD: 8.2 % (ref 4–15)
NEUTROPHILS # BLD AUTO: 2.7 K/UL (ref 1.8–7.7)
NEUTROPHILS NFR BLD: 73 % (ref 38–73)
NRBC BLD-RTO: 0 /100 WBC
OVALOCYTES BLD QL SMEAR: ABNORMAL
PHOSPHATE SERPL-MCNC: 3.4 MG/DL (ref 2.7–4.5)
PLATELET # BLD AUTO: 109 K/UL (ref 150–450)
PLATELET BLD QL SMEAR: ABNORMAL
PMV BLD AUTO: ABNORMAL FL (ref 9.2–12.9)
POIKILOCYTOSIS BLD QL SMEAR: ABNORMAL
POLYCHROMASIA BLD QL SMEAR: ABNORMAL
POTASSIUM SERPL-SCNC: 3 MMOL/L (ref 3.5–5.1)
PROT SERPL-MCNC: 7.1 G/DL (ref 6–8.4)
RBC # BLD AUTO: 5.67 M/UL (ref 4–5.4)
SCHISTOCYTES BLD QL SMEAR: ABNORMAL
SCHISTOCYTES BLD QL SMEAR: PRESENT
SODIUM SERPL-SCNC: 141 MMOL/L (ref 136–145)
SPHEROCYTES BLD QL SMEAR: ABNORMAL
TARGETS BLD QL SMEAR: ABNORMAL
WBC # BLD AUTO: 3.66 K/UL (ref 3.9–12.7)

## 2024-01-08 PROCEDURE — 85025 COMPLETE CBC W/AUTO DIFF WBC: CPT | Performed by: INTERNAL MEDICINE

## 2024-01-08 PROCEDURE — 80053 COMPREHEN METABOLIC PANEL: CPT | Performed by: INTERNAL MEDICINE

## 2024-01-08 PROCEDURE — 83735 ASSAY OF MAGNESIUM: CPT | Performed by: INTERNAL MEDICINE

## 2024-01-08 PROCEDURE — 84100 ASSAY OF PHOSPHORUS: CPT | Performed by: INTERNAL MEDICINE

## 2024-01-08 PROCEDURE — 36415 COLL VENOUS BLD VENIPUNCTURE: CPT

## 2024-01-08 PROCEDURE — 83880 ASSAY OF NATRIURETIC PEPTIDE: CPT | Performed by: INTERNAL MEDICINE

## 2024-01-08 NOTE — PROGRESS NOTES
1300  right upper arm PICC line dressing change done using sterile technique.  Stat lock device  biopatch and tegaderm reapplied   Tolerated well

## 2024-01-09 ENCOUNTER — OFFICE VISIT (OUTPATIENT)
Dept: PALLIATIVE MEDICINE | Facility: CLINIC | Age: 59
End: 2024-01-09
Payer: MEDICAID

## 2024-01-09 ENCOUNTER — TELEPHONE (OUTPATIENT)
Dept: PALLIATIVE MEDICINE | Facility: CLINIC | Age: 59
End: 2024-01-09
Payer: MEDICAID

## 2024-01-09 DIAGNOSIS — Z51.5 PALLIATIVE CARE ENCOUNTER: ICD-10-CM

## 2024-01-09 DIAGNOSIS — M79.672 PAIN IN BOTH FEET: ICD-10-CM

## 2024-01-09 DIAGNOSIS — R11.0 NAUSEA: ICD-10-CM

## 2024-01-09 DIAGNOSIS — I50.9 CONGESTIVE HEART FAILURE, UNSPECIFIED HF CHRONICITY, UNSPECIFIED HEART FAILURE TYPE: Primary | ICD-10-CM

## 2024-01-09 DIAGNOSIS — M79.671 PAIN IN BOTH FEET: ICD-10-CM

## 2024-01-09 DIAGNOSIS — K59.00 CONSTIPATION, UNSPECIFIED CONSTIPATION TYPE: ICD-10-CM

## 2024-01-09 DIAGNOSIS — M10.9 GOUT, ARTHRITIS: ICD-10-CM

## 2024-01-09 PROCEDURE — 99214 OFFICE O/P EST MOD 30 MIN: CPT | Mod: 95,,, | Performed by: STUDENT IN AN ORGANIZED HEALTH CARE EDUCATION/TRAINING PROGRAM

## 2024-01-09 PROCEDURE — 1111F DSCHRG MED/CURRENT MED MERGE: CPT | Mod: CPTII,95,, | Performed by: STUDENT IN AN ORGANIZED HEALTH CARE EDUCATION/TRAINING PROGRAM

## 2024-01-09 RX ORDER — ONDANSETRON 8 MG/1
8 TABLET, ORALLY DISINTEGRATING ORAL EVERY 8 HOURS PRN
Qty: 30 TABLET | Refills: 4 | Status: SHIPPED | OUTPATIENT
Start: 2024-01-09

## 2024-01-09 NOTE — TELEPHONE ENCOUNTER
Called the patient to if she would be attending her 1 pm virtual appointment with Dr. Michel. There was no answer left a voicemail.

## 2024-01-09 NOTE — PROGRESS NOTES
Palliative Medicine Clinic Note      Consult Requested By: No ref. provider found    Primary Care Physician:   Cristobal Ann MD    Reason for Consult: Advance care planning and symptom management in the setting of heart Failure     The patient location is: [facility]  The chief complaint leading to consultation is: pain    Visit type: audiovisual    Face to Face time with patient: 31min  40 minutes of total time spent on the encounter, which includes face to face time and non-face to face time preparing to see the patient (eg, review of tests), Obtaining and/or reviewing separately obtained history, Documenting clinical information in the electronic or other health record, Independently interpreting results (not separately reported) and communicating results to the patient/family/caregiver, or Care coordination (not separately reported).       Each patient provided with medical services by telemedicine is:  (1) informed of the relationship between the physician and patient and the respective role of any other health care provider with respect to management of the patient; and (2) notified that he or she may decline to receive medical services by telemedicine and may withdraw from such care at any time.          ASSESSMENT/PLAN:     Plan/Recommendations:  Diagnoses and all orders for this visit:      Congestive heart failure, unspecified HF chronicity, unspecified heart failure type /  Acute decompensated heart failure / Pulmonary HTN  -NICM since 2013 HFrEF (EF 10%) s/p AICD placement on palliative dobutamine   -Not a candidate for advance therapies   -recent admission for ADHF in December  -Taking Bumex    - Mostly sitting and sob is manageable had the flu so now she is cough and phlegm for the last 4 days.  -Weight 180 discussed the importance of wearing his self daily    Pain   Bilateral lower extremity, sharp burning and tingling pain  -Lyrica 50 mg q.h.s.  -Norco 10-325mg q 6H PRN  -On allopurinol and  probenecid managed by PCP, discussed eConsult recommendation she will wait until her rheumatology appt  2/2024 because she has not been having symptoms on prednisone      Fatigue/dyspnea  -discussed the importance of graded exercise  -continues to walk around the house, however describes more weakness in her legs and stairs or getting difficult      Depression/anxiety  - worsening depression  - discussed PM Behavioral therapy and  support   - avoiding Xanax as much as possible   -she stopped Lexapro    Nausea  -  ondansetron (ZOFRAN-ODT) 8 MG  every 12 (twelve) hours PRN      Constipation  - Miralax    Referral To podiatrist placed      Palliative care encounter  Medicolegal: Has decision making capacity. Named her daughter Erika Estrada is HCPOA.     Psychosocial:  support system consists of granddaughter, daughter and extended faily    Understanding of disease and Illness Trajectory: Patient  has  adequate understanding of her illness, they can benefit from continued education on what to expect in the future.      Goals of care:    ACP Date: 07/11/2023  I engaged the patient and   in a voluntary conversation about advance care planning and we specifically addressed what the goals of care would be moving forward, in light of the patient's change in clinical status, specifically worsening heart failure with multiple admissions.  We did specifically address the patient's likely prognosis, which is poor.  I shared that in the best case scenario her prognosis might be month and that given the nature of her heart condition she can die any time. I did explain the role for hospice care at this stage of the patient's illness, including its ability to help the patient live with the best quality of life possible.  We explored the patient's values and preferences for future care.  The patient endorses that what is most important right now is to focus on curative/life-prolongation (regardless of treatment  burdens),.  She states that she is doing everything in her power to be evaluated at [facility] because she is hoping to obtain a heart transplant.  Her  encouraged her to do everything as fast as possible so that she can go to [place] soon. Accordingly, we have decided that the best plan to meet the patient's goals includes continuing with treatment      ACP Date: 05/24/2023  I engaged the patient in a conversation about advance care planning and we specifically addressed what the goals of care would be moving forward, in light of the patient's change in clinical status, specifically now on palliative dobutamine.  We did specifically address the patient's likely prognosis, which is poor.  We discussed that dobutamine is mainly to help her feel better and will not change her heart condition. We explored the patient's values and preferences for future care.  The patient endorses that what is most important right now is to focus on remaining as independent as possible, symptom/pain control, and curative/life-prolongation (regardless of treatment burdens).  She shared that she is hoping for the better that she is hoping to get better.  She hopes that her heart will get better, she shared that she knows that she has been told in the past that it will not improve.  She shared that she is hoping for a miracle to happen so that her heart improves.  She is also hoping to be able to spend more time with family to be part of the activities to go out and enjoy eating with them.  Accordingly, we have decided that the best plan to meet the patient's goals includes continuing with treatment      ACP Date: 02/27/2023  I engaged the patient in a conversation about advance care planning.  We discussed HCPOA, she wanted to change her current HCPOA and name her daughter Erika Estrada as her agent and her sister and son as backups. We specifically addressed what the goals of care would be moving forward, in light of  the patient's change in clinical status, specifically transplant workup.  We did specifically address the patient's likely prognosis, which is poor w/o a trasnplant.  We explored the patient's values and preferences for future care.  The patient endorses that what is most important right now is to focus on curative/life-prolongation (regardless of treatment burdens)  Accordingly, we have decided that the best plan to meet the patient's goals includes continuing with treatment      Code status:  Full code    Advance directives:HCPOA on file       min time was spent on advance care planning, goals of care discussion, emotional support, formulating and communicating prognosis and goals of care, exploring burden/benefit of various approaches of treatment.          Follow up: 1m in person        SUBJECTIVE:     History obtained from: patient     complaint: No chief complaint on file.      History of Present Illness / Interval History:  Hafsa Hawley is 58 y.o. year old female presenting with heart failure .  Referred to Palliative Care for evaluation and management of physical symptoms. female attended the appointment alone     Lower extremity pain has been well-controlled on prednisone 5 mg.  She is feeling better.  Minimal shortness of breath might be sick with a cold.  She continues to have significant anxiety.  Has significant nausea and constipation.  Her weight is up and down however she her dry weight is around 179 lb.  Unable to sleep well at night.    --------      Recent ED visits due to lower extremity pain  Continues to have pain in her left food and right knee.  Describes a sharp pain and she is taking her pain medication which helps with her pain but does not completely resolve it.  She takes Tylenol as well.  Describes her lower extremity pain as burning and tingling and sometimes sharp  Worsening anxiety everyday all day.  Taking Zofran daily  Not constipated she is taking Dulcolax  Not on Ozempic  for now because of a high co-pay  She is stopped Lexapro    ---------    [facility] admission for ADHF 6/27  CKD plus [place] from cardiorenal syndrome and ADCHF. She now is close to euvolemic. On  dobutamine 5mcg/kg/min and Bumex   She reports nausea occasionally, having daily bowel movements.  Has cough all day however she does not feel swollen.  He feels her legs are weaker and now stairs her becoming difficult to navigate.    ----------  She states that she has not been feeling good.  She has abdominal pain significant constipation.  She also describes nausea and bloating.  She shared that her medications are constantly changing so it is difficult for her to keep track of those changes.  She is compliant with medications she is drinking some water because she feels very dry.  She has an appointment with Cardiology on the 17th    -----    She reports that she is feeling okay now that she is on the dobutamine pump.  She feels it has made a difference in her energy level and that she is not eating too much oxygen anymore.  She continues to hope for improvement.  However she is unable to do the things that she enjoys like spending time with her grandkids and going out because sometimes she is not feeling well.  She feels fatigued she has a lot of pain in her knees and her hips and legs.  She is unable to walk at times.  She also endorsed decreased appetite and significant nausea that improves with Zofran however she needs to take 8 mg for it to work.         Disease History:  NICM since 2013 HFrEF (EF 10%) s/p AICD placement  pulmonary hypertension  chronic hypoxic and hypercapnic respiratory failure   Paroxysmal A Fib on Eliquis, Pulmonary Hypertension  Hypertension, Type 2 Diabetes Mellitus     presented to committee on 3/9/22 declined for OHT or LVAD due to renal function, lung function     Previous experience or exposure to a serious illness: no      External  database queried on 01/09/2024  by Sandra Hernandes .    The results reviewed and considered with the clinical data in the decision whether or not to prescribe a controlled substance.    10/17/2023  08/11/2023   3  Promethazine-Codeine Solution 240.00  16  [facility]  [id number]   [facility] (1974)  3  4.50 MME  [place]    10/11/2023  10/05/2023   3  Hydrocodone-Acetamin  Mg 90.00  30  [facility]  [id number]   [facility] (1974)  0  30.00 M[place]    10/04/2023  07/07/2023   1  Alprazolam 2 Mg Tablet 60.00  30  [facility]  [id number]   [place] ([id number])  3  8.00 L[place]    09/14/2023 08/11/2023   3  Promethazine-Codeine Solution 240.00  16  [facility]  [id number]   [facility] (1974)  2  4.50 MME  [place]    09/12/2023 09/12/2023   1  Hydrocodone-Acetamin 7.5-325 90.00  30  [facility]  [id number]   [facility] ([id number])              Medications:    Current Outpatient Medications:     albuterol-ipratropium (DUO-NEB) 2.5 mg-0.5 mg/3 mL nebulizer solution, Take 3 mLs by nebulization every 6 (six) hours as needed for Wheezing or Shortness of Breath., Disp: 90 mL, Rfl: 3    allopurinoL (ZYLOPRIM) 300 MG tablet, Take 1 tablet (300 mg total) by mouth once daily., Disp: 90 tablet, Rfl: 3    ALPRAZolam (XANAX) 2 MG Tab, Take 1 tablet (2 mg total) by mouth 2 (two) times daily., Disp: 60 tablet, Rfl: 3    amiodarone (PACERONE) 200 MG Tab, Take 1 tablet (200 mg total) by mouth once daily., Disp: 30 tablet, Rfl: 11    apixaban (ELIQUIS) 2.5 mg Tab, Take 1 tablet (2.5 mg total) by mouth 2 (two) times daily., Disp: 60 tablet, Rfl: 12    apixaban (ELIQUIS) 5 mg Tab, Take 1 tablet (5 mg total) by mouth 2 (two) times a day., Disp: 60 tablet, Rfl: 11    atorvastatin (LIPITOR) 40 MG tablet, Take 1 tablet (40 mg total) by mouth every evening., Disp: 90 tablet, Rfl: 8    b complex vitamins tablet, Take 1 tablet by mouth once daily., Disp: , Rfl:     blood sugar diagnostic (ACCU-CHEK GUIDE TEST STRIPS) Strp, 1 strip by Other route 3 (three) times daily., Disp: 100 each, Rfl: 11    blood-glucose sensor (DEXCOM G7  SENSOR) Evon, change every 10 days, Disp: 3 each, Rfl: 11    blood-glucose sensor (DEXCOM G7 SENSOR) Evon, change sensor every 10 days., Disp: 3 each, Rfl: 11    bumetanide (BUMEX) 2 MG tablet, Take 2 tablets (4mg total) by mouth in morning and take 1 tablet (2mg total) by mouth in the evening (no later then 5pm)., Disp: 90 tablet, Rfl: 5    bumetanide (BUMEX) 2 MG tablet, 2 tablets (4 mg total) every morning AND 1 tablet (2 mg total) every evening., Disp: 90 tablet, Rfl: 0    cetirizine (ZYRTEC) 10 MG tablet, Take 1 tablet (10 mg total) by mouth once daily., Disp: 30 tablet, Rfl: 12    cetirizine (ZYRTEC) 5 MG tablet, Take 1 tablet (5 mg total) by mouth daily as needed for Allergies., Disp: , Rfl:     diclofenac sodium (VOLTAREN) 1 % Gel, Apply 2 g topically 3 (three) times daily as needed for pain., Disp: 300 g, Rfl: 1    DOBUTamine (DOBUTREX) 1,000 mg/250 mL (4,000 mcg/mL) infusion, Inject 409 mcg/min into the vein continuous., Disp: , Rfl:     ergocalciferol (ERGOCALCIFEROL) 50,000 unit Cap, Take 1 capsule (50,000 Units total) by mouth once a week., Disp: 4 capsule, Rfl: 11    famotidine (PEPCID) 10 MG tablet, Take 1 tablet (10 mg total) by mouth 2 (two) times daily., Disp: 60 tablet, Rfl: 12    febuxostat (ULORIC) 40 mg Tab, Take 40 mg by mouth once daily., Disp: , Rfl:     ferrous sulfate (FEOSOL) 325 mg (65 mg iron) Tab tablet, Take 1 tablet (325 mg total) by mouth 3 (three) times daily., Disp: 90 tablet, Rfl: 12    fluticasone propionate (FLONASE ALLERGY RELIEF) 50 mcg/actuation nasal spray, 2 sprays (100 mcg total) by Each Nostril route once daily., Disp: 16 g, Rfl: 12    fluticasone propionate (FLONASE) 50 mcg/actuation nasal spray, 2 sprays by Each Nostril route daily as needed for Rhinitis. , Disp: , Rfl:     glimepiride (AMARYL) 2 MG tablet, Take 1 tablet (2 mg total) by mouth once daily., Disp: 30 tablet, Rfl: 11    HYDROcodone-acetaminophen (NORCO)  mg per tablet, Take 1 tablet by mouth every 6  (six) hours as needed for Pain., Disp: 120 tablet, Rfl: 0    isosorbide-hydrALAZINE 20-37.5 mg (BIDIL) 20-37.5 mg Tab, Take by mouth., Disp: , Rfl:     JARDIANCE 25 mg tablet, Take 25 mg by mouth., Disp: , Rfl:     lancets (ONETOUCH DELICA PLUS LANCET) 30 gauge Misc, by Misc.(Non-Drug; Combo Route) route 3 (three) times daily., Disp: 100 each, Rfl: 11    LIDOcaine (LIDODERM) 5 %, Place 1 patch onto the skin once daily. Remove & discard patch within 12 hours or as directed by MD., Disp: 30 patch, Rfl: 2    metOLazone (ZAROXOLYN) 5 MG tablet, Take 1 tablet (5 mg total) by mouth once daily., Disp: 30 tablet, Rfl: 11    mometasone-formoterol (DULERA) 200-5 mcg/actuation inhaler, Inhale 2 puffs into the lungs 2 (two) times daily. Controller, Disp: 13 g, Rfl: 11    MOUNJARO 2.5 mg/0.5 mL PnIj, Inject into the skin., Disp: , Rfl:     nystatin (MYCOSTATIN) 100,000 unit/mL suspension, Take 5 mLs (500,000 Units total) by mouth 3 (three) times daily as needed., Disp: 473 mL, Rfl: 12    ondansetron (ZOFRAN-ODT) 4 MG TbDL, Take 4 mg by mouth every 8 (eight) hours as needed., Disp: , Rfl:     ondansetron (ZOFRAN-ODT) 8 MG TbDL, Take 1 tablet (8 mg total) by mouth every 8 (eight) hours as needed., Disp: 30 tablet, Rfl: 4    pantoprazole (PROTONIX) 40 MG tablet, TAKE 1 TABLET BY MOUTH DAILY IN THE MORNING, Disp: 30 tablet, Rfl: 11    predniSONE (DELTASONE) 5 MG tablet, Take 1 tablet (5 mg total) by mouth once daily., Disp: 30 tablet, Rfl: 3    pregabalin (LYRICA) 50 MG capsule, Take 1 capsule (50 mg total) by mouth every evening., Disp: 60 capsule, Rfl: 5    probenecid (BENEMID) 500 mg Tab, Take 1/2 tablet by mouth twice daily., Disp: 60 tablet, Rfl: 1    promethazine (PHENERGAN) 25 MG suppository, Place 1 suppository (25 mg total) rectally every 6 (six) hours as needed for nausea., Disp: 10 suppository, Rfl: 5    promethazine-codeine 6.25-10 mg/5 ml (PHENERGAN WITH CODEINE) 6.25-10 mg/5 mL syrup, Take 5 mL orally every 6 hours as  "needed., Disp: 473 mL, Rfl: 0    promethazine-codeine 6.25-10 mg/5 ml (PHENERGAN WITH CODEINE) 6.25-10 mg/5 mL syrup, Take 5 mLs by mouth every 6 (six) hours as needed., Disp: 473 mL, Rfl: 0    sacubitriL-valsartan (ENTRESTO) 24-26 mg per tablet, Take 1 tablet by mouth 2 (two) times daily., Disp: 60 tablet, Rfl: 12    sacubitriL-valsartan (ENTRESTO) 49-51 mg per tablet, Take 1 tablet by mouth 2 (two) times a day., Disp: 60 tablet, Rfl: 11    scopolamine (TRANSDERM-SCOP) 1.3-1.5 mg (1 mg over 3 days), Place 1 patch onto the skin every 72 hours as needed for nausea., Disp: 24 patch, Rfl: 2    semaglutide (OZEMPIC) 0.25 mg or 0.5 mg (2 mg/3 mL) pen injector, Inject 0.5 mg into the skin every 7 days., Disp: 3 mL, Rfl: 11    SPIRIVA WITH HANDIHALER 18 mcg inhalation capsule, Inhale 1 capsule (18 mcg total) into the lungs once daily., Disp: 30 capsule, Rfl: 5    VENTOLIN HFA 90 mcg/actuation inhaler, Inhale 2 puffs into the lungs every 6 (six) hours as needed for Shortness of Breath or Wheezing., Disp: 18 g, Rfl: 11  No current facility-administered medications for this visit.    Facility-Administered Medications Ordered in Other Visits:     0.9%  NaCl infusion, , Intravenous, Continuous, Corinna Hayes NP, New Bag at 05/23/19 0787    vancomycin in dextrose 5 % 1 gram/250 mL IVPB 1,000 mg, 1,000 mg, Intravenous, On Call Procedure, Corinna Hayes NP, 1,000 mg at 05/23/19 0736      Review of patient's allergies indicates:   Allergen Reactions    Penicillins Hives and Other (See Comments)    Iodinated contrast media Nausea And Vomiting    Oxycodone-acetaminophen Other (See Comments)     Nausea, Dizziness, Anxiety.  "I don't like how it makes me feel."   Given Hydromorphone 0.5mg IVP  Without problems.  Other reaction(s): Other (See Comments)    Clonazepam Other (See Comments)    Diovan hct [valsartan-hydrochlorothiazide] Other (See Comments)     Causes coughing    Iodine Other (See Comments)    Irinotecan     " " Pt has homozygosity for the TA7 promoter variant that places this individual at significantly increased risk for   severe neutropenia (grade 4) when treated with the standard dose of irinotecan (risk approximately 50%).   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 Metabolism of other drugs not listed here may also be impacted by UGT1A1 enzyme activity.       Tramadol Nausea And Vomiting and Other (See Comments)     Other reaction(s): Other (See Comments)    Valsartan Other (See Comments)       OBJECTIVE:       Review of Symptoms      Symptom Assessment (ESAS 0-10 Scale)  Pain:  0  Dyspnea:  0  Anxiety:  10  Nausea:  10  Depression:  0  Anorexia:  5  Fatigue:  5  Insomnia:  10  Restlessness:  0  Agitation:  0     CAM / Delirium:  Negative  Constipation:  Negative  Diarrhea:  Negative    Anxiety:  Is not nervous/anxious  Constipation:  Constipation    Comments:  Miralax    Pain Assessment:    Location(s): none      Performance Status:  60    Living Arrangements:  Lives with family    Psychosocial/Cultural:   See Palliative Psychosocial Note: Yes  Lives with  and granddaughter. Pt lives in a mobile home with 5 NIRAJ. Pt also has support from her two sisters Benoit Baum [phone], Jeanie Jaimes [phone]. Pt is mostly independent with ADL's, but has a RW, SC and home oxygen, which she states she uses "as needed".   dgtr Erika Estrada, 33, [place], son Miguel Baum, 35  **Primary  to Follow**  Palliative Care  Consult: Yes    Spiritual:  F - Damaris and Belief:  Orthodox       Advance Care Planning   Advance Directives:   Living Will: No    LaPOST: No    Do Not Resuscitate Status: No    Medical Power of : Yes    Agent's Name:  Erika Estrada    Decision Making:  Patient answered questions  Goals of Care: What is most important right now is to focus on " remaining as independent as possible, symptom/pain control, curative/life-prolongation (regardless of treatment burdens). Accordingly, we have decided that the best plan to meet the patient's goals includes continuing with treatment.              Physical Exam:  Vitals:    Physical Exam  Constitutional:       General: She is not in acute distress.  Pulmonary:      Effort: Pulmonary effort is normal. No respiratory distress.   Musculoskeletal:      Cervical back: Neck supple.   Neurological:      Mental Status: She is alert and oriented to person, place, and time.   Psychiatric:         Mood and Affect: Mood and affect normal.           Radiology:I have reviewed all pertinent imaging results/findings within the past 24 hours.  Results for orders placed during the hospital encounter of 03/26/23    Echo    Interpretation Summary  · The left ventricle is severely enlarged with eccentric hypertrophy and severely decreased systolic function. The estimated ejection fraction is 15%.  · Normal right ventricular size with moderately reduced right ventricular systolic function.  · Grade II left ventricular diastolic dysfunction.  · Biatrial enlargement.  · Mild mitral regurgitation.  · Small pericardial effusion.  · The estimated PA systolic pressure is 51 mmHg (by tricuspid regurgitation velocity). The estimated mean PA pressure is 39mm Hg.  · Intermediate central venous pressure (8 mmHg).        I spent a total of 35 minutes on the day of the visit. This includes face to face time in discussion of goals of care, symptom assessment, coordination of care and emotional support.  This also includes non-face to face time preparing to see the patient (eg, review of tests/imaging), obtaining and/or reviewing separately obtained history, documenting clinical information in the electronic or other health record, independently interpreting results and communicating results to the patient/family/caregiver, or care coordinator.        This note was partially created using [employer] Voice Recognition software. Typographical and content errors may occur with this process. While efforts are made to detect and correct such errors, in some cases errors will persist. For this reason, wording in this document should be considered in the proper context and not strictly verbatim.      Signature: Sandra Hernandes MD

## 2024-01-15 ENCOUNTER — INFUSION (OUTPATIENT)
Dept: INFUSION THERAPY | Facility: HOSPITAL | Age: 59
End: 2024-01-15
Attending: INTERNAL MEDICINE
Payer: MEDICAID

## 2024-01-15 VITALS
TEMPERATURE: 98 F | HEART RATE: 96 BPM | DIASTOLIC BLOOD PRESSURE: 86 MMHG | SYSTOLIC BLOOD PRESSURE: 131 MMHG | RESPIRATION RATE: 20 BRPM

## 2024-01-15 DIAGNOSIS — I42.9 CARDIOMYOPATHY, UNSPECIFIED TYPE: Primary | ICD-10-CM

## 2024-01-15 LAB
ALBUMIN SERPL BCP-MCNC: 3.8 G/DL (ref 3.5–5.2)
ALP SERPL-CCNC: 61 U/L (ref 55–135)
ALT SERPL W/O P-5'-P-CCNC: 15 U/L (ref 10–44)
ANION GAP SERPL CALC-SCNC: 10 MMOL/L (ref 8–16)
ANISOCYTOSIS BLD QL SMEAR: ABNORMAL
AST SERPL-CCNC: 20 U/L (ref 10–40)
BASOPHILS # BLD AUTO: 0.02 K/UL (ref 0–0.2)
BASOPHILS NFR BLD: 0.5 % (ref 0–1.9)
BILIRUB SERPL-MCNC: 1.4 MG/DL (ref 0.1–1)
BNP SERPL-MCNC: 758 PG/ML (ref 0–99)
BUN SERPL-MCNC: 63 MG/DL (ref 6–20)
CALCIUM SERPL-MCNC: 9.2 MG/DL (ref 8.7–10.5)
CHLORIDE SERPL-SCNC: 104 MMOL/L (ref 95–110)
CO2 SERPL-SCNC: 26 MMOL/L (ref 23–29)
CREAT SERPL-MCNC: 2.5 MG/DL (ref 0.5–1.4)
DACRYOCYTES BLD QL SMEAR: ABNORMAL
DIFFERENTIAL METHOD BLD: ABNORMAL
EOSINOPHIL # BLD AUTO: 0.1 K/UL (ref 0–0.5)
EOSINOPHIL NFR BLD: 2.1 % (ref 0–8)
ERYTHROCYTE [DISTWIDTH] IN BLOOD BY AUTOMATED COUNT: 31.1 % (ref 11.5–14.5)
EST. GFR  (NO RACE VARIABLE): 22 ML/MIN/1.73 M^2
GLUCOSE SERPL-MCNC: 122 MG/DL (ref 70–110)
HCT VFR BLD AUTO: 34.5 % (ref 37–48.5)
HGB BLD-MCNC: 10 G/DL (ref 12–16)
IMM GRANULOCYTES # BLD AUTO: 0.02 K/UL (ref 0–0.04)
IMM GRANULOCYTES NFR BLD AUTO: 0.5 % (ref 0–0.5)
LYMPHOCYTES # BLD AUTO: 0.7 K/UL (ref 1–4.8)
LYMPHOCYTES NFR BLD: 17.2 % (ref 18–48)
MAGNESIUM SERPL-MCNC: 1.6 MG/DL (ref 1.6–2.6)
MCH RBC QN AUTO: 16.3 PG (ref 27–31)
MCHC RBC AUTO-ENTMCNC: 29 G/DL (ref 32–36)
MCV RBC AUTO: 56 FL (ref 82–98)
MONOCYTES # BLD AUTO: 0.4 K/UL (ref 0.3–1)
MONOCYTES NFR BLD: 8.6 % (ref 4–15)
NEUTROPHILS # BLD AUTO: 3.1 K/UL (ref 1.8–7.7)
NEUTROPHILS NFR BLD: 71.1 % (ref 38–73)
NRBC BLD-RTO: 1 /100 WBC
PHOSPHATE SERPL-MCNC: 3.4 MG/DL (ref 2.7–4.5)
PLATELET # BLD AUTO: 291 K/UL (ref 150–450)
PLATELET BLD QL SMEAR: ABNORMAL
PMV BLD AUTO: ABNORMAL FL (ref 9.2–12.9)
POIKILOCYTOSIS BLD QL SMEAR: ABNORMAL
POTASSIUM SERPL-SCNC: 3.9 MMOL/L (ref 3.5–5.1)
PROT SERPL-MCNC: 7.2 G/DL (ref 6–8.4)
RBC # BLD AUTO: 6.14 M/UL (ref 4–5.4)
SCHISTOCYTES BLD QL SMEAR: ABNORMAL
SCHISTOCYTES BLD QL SMEAR: PRESENT
SODIUM SERPL-SCNC: 140 MMOL/L (ref 136–145)
TARGETS BLD QL SMEAR: ABNORMAL
WBC # BLD AUTO: 4.3 K/UL (ref 3.9–12.7)

## 2024-01-15 PROCEDURE — 83880 ASSAY OF NATRIURETIC PEPTIDE: CPT | Performed by: INTERNAL MEDICINE

## 2024-01-15 PROCEDURE — 85025 COMPLETE CBC W/AUTO DIFF WBC: CPT | Performed by: INTERNAL MEDICINE

## 2024-01-15 PROCEDURE — 80053 COMPREHEN METABOLIC PANEL: CPT | Performed by: INTERNAL MEDICINE

## 2024-01-15 PROCEDURE — 36415 COLL VENOUS BLD VENIPUNCTURE: CPT

## 2024-01-15 PROCEDURE — 83735 ASSAY OF MAGNESIUM: CPT | Performed by: INTERNAL MEDICINE

## 2024-01-15 PROCEDURE — 36415 COLL VENOUS BLD VENIPUNCTURE: CPT | Mod: XB | Performed by: INTERNAL MEDICINE

## 2024-01-15 PROCEDURE — 84100 ASSAY OF PHOSPHORUS: CPT | Performed by: INTERNAL MEDICINE

## 2024-01-15 NOTE — PROGRESS NOTES
1400  Blood drawn from L AC via venistick.  R upper arm PICC line dressing change done using sterile technique.  No signs of infection noted.  Stat lock device, bio patch and tegaderm dressing applied.  Tolerated well

## 2024-01-22 ENCOUNTER — INFUSION (OUTPATIENT)
Dept: INFUSION THERAPY | Facility: HOSPITAL | Age: 59
End: 2024-01-22
Attending: INTERNAL MEDICINE
Payer: MEDICAID

## 2024-01-22 ENCOUNTER — CLINICAL SUPPORT (OUTPATIENT)
Dept: CARDIOLOGY | Facility: HOSPITAL | Age: 59
End: 2024-01-22
Attending: INTERNAL MEDICINE
Payer: MEDICAID

## 2024-01-22 ENCOUNTER — CLINICAL SUPPORT (OUTPATIENT)
Dept: CARDIOLOGY | Facility: HOSPITAL | Age: 59
End: 2024-01-22
Payer: MEDICAID

## 2024-01-22 VITALS
WEIGHT: 195 LBS | DIASTOLIC BLOOD PRESSURE: 70 MMHG | BODY MASS INDEX: 31.34 KG/M2 | HEART RATE: 100 BPM | TEMPERATURE: 98 F | SYSTOLIC BLOOD PRESSURE: 112 MMHG | RESPIRATION RATE: 20 BRPM | HEIGHT: 66 IN

## 2024-01-22 DIAGNOSIS — Z95.810 PRESENCE OF AUTOMATIC (IMPLANTABLE) CARDIAC DEFIBRILLATOR: ICD-10-CM

## 2024-01-22 DIAGNOSIS — I42.9 CARDIOMYOPATHY, UNSPECIFIED TYPE: Primary | ICD-10-CM

## 2024-01-22 LAB
ALBUMIN SERPL BCP-MCNC: 3.7 G/DL (ref 3.5–5.2)
ALP SERPL-CCNC: 62 U/L (ref 55–135)
ALT SERPL W/O P-5'-P-CCNC: 20 U/L (ref 10–44)
ANION GAP SERPL CALC-SCNC: 12 MMOL/L (ref 8–16)
ANISOCYTOSIS BLD QL SMEAR: ABNORMAL
AST SERPL-CCNC: 21 U/L (ref 10–40)
BASOPHILS # BLD AUTO: 0.03 K/UL (ref 0–0.2)
BASOPHILS NFR BLD: 0.6 % (ref 0–1.9)
BILIRUB SERPL-MCNC: 1.5 MG/DL (ref 0.1–1)
BNP SERPL-MCNC: 625 PG/ML (ref 0–99)
BUN SERPL-MCNC: 68 MG/DL (ref 6–20)
CALCIUM SERPL-MCNC: 9.5 MG/DL (ref 8.7–10.5)
CHLORIDE SERPL-SCNC: 103 MMOL/L (ref 95–110)
CO2 SERPL-SCNC: 26 MMOL/L (ref 23–29)
CREAT SERPL-MCNC: 2.5 MG/DL (ref 0.5–1.4)
DACRYOCYTES BLD QL SMEAR: ABNORMAL
DIFFERENTIAL METHOD BLD: ABNORMAL
EOSINOPHIL # BLD AUTO: 0.1 K/UL (ref 0–0.5)
EOSINOPHIL NFR BLD: 1.9 % (ref 0–8)
ERYTHROCYTE [DISTWIDTH] IN BLOOD BY AUTOMATED COUNT: 28.6 % (ref 11.5–14.5)
EST. GFR  (NO RACE VARIABLE): 22 ML/MIN/1.73 M^2
GIANT PLATELETS BLD QL SMEAR: PRESENT
GLUCOSE SERPL-MCNC: 132 MG/DL (ref 70–110)
HCT VFR BLD AUTO: 32.4 % (ref 37–48.5)
HGB BLD-MCNC: 9.9 G/DL (ref 12–16)
IMM GRANULOCYTES # BLD AUTO: 0.03 K/UL (ref 0–0.04)
IMM GRANULOCYTES NFR BLD AUTO: 0.6 % (ref 0–0.5)
LYMPHOCYTES # BLD AUTO: 0.9 K/UL (ref 1–4.8)
LYMPHOCYTES NFR BLD: 17.1 % (ref 18–48)
MAGNESIUM SERPL-MCNC: 1.6 MG/DL (ref 1.6–2.6)
MCH RBC QN AUTO: 17.6 PG (ref 27–31)
MCHC RBC AUTO-ENTMCNC: 30.6 G/DL (ref 32–36)
MCV RBC AUTO: 58 FL (ref 82–98)
MONOCYTES # BLD AUTO: 0.4 K/UL (ref 0.3–1)
MONOCYTES NFR BLD: 7.8 % (ref 4–15)
NEUTROPHILS # BLD AUTO: 3.7 K/UL (ref 1.8–7.7)
NEUTROPHILS NFR BLD: 72 % (ref 38–73)
NRBC BLD-RTO: 0 /100 WBC
OVALOCYTES BLD QL SMEAR: ABNORMAL
PHOSPHATE SERPL-MCNC: 3.4 MG/DL (ref 2.7–4.5)
PLATELET # BLD AUTO: 278 K/UL (ref 150–450)
PMV BLD AUTO: ABNORMAL FL (ref 9.2–12.9)
POIKILOCYTOSIS BLD QL SMEAR: ABNORMAL
POLYCHROMASIA BLD QL SMEAR: ABNORMAL
POTASSIUM SERPL-SCNC: 3.6 MMOL/L (ref 3.5–5.1)
PROT SERPL-MCNC: 6.8 G/DL (ref 6–8.4)
RBC # BLD AUTO: 5.62 M/UL (ref 4–5.4)
SCHISTOCYTES BLD QL SMEAR: ABNORMAL
SCHISTOCYTES BLD QL SMEAR: PRESENT
SODIUM SERPL-SCNC: 141 MMOL/L (ref 136–145)
SPHEROCYTES BLD QL SMEAR: ABNORMAL
TARGETS BLD QL SMEAR: ABNORMAL
WBC # BLD AUTO: 5.16 K/UL (ref 3.9–12.7)

## 2024-01-22 PROCEDURE — 83880 ASSAY OF NATRIURETIC PEPTIDE: CPT | Performed by: INTERNAL MEDICINE

## 2024-01-22 PROCEDURE — 93295 DEV INTERROG REMOTE 1/2/MLT: CPT | Mod: ,,, | Performed by: INTERNAL MEDICINE

## 2024-01-22 PROCEDURE — 36415 COLL VENOUS BLD VENIPUNCTURE: CPT | Performed by: INTERNAL MEDICINE

## 2024-01-22 PROCEDURE — 93296 REM INTERROG EVL PM/IDS: CPT | Performed by: INTERNAL MEDICINE

## 2024-01-22 PROCEDURE — 36592 COLLECT BLOOD FROM PICC: CPT

## 2024-01-22 PROCEDURE — 83735 ASSAY OF MAGNESIUM: CPT | Performed by: INTERNAL MEDICINE

## 2024-01-22 PROCEDURE — 85025 COMPLETE CBC W/AUTO DIFF WBC: CPT | Performed by: INTERNAL MEDICINE

## 2024-01-22 PROCEDURE — 84100 ASSAY OF PHOSPHORUS: CPT | Performed by: INTERNAL MEDICINE

## 2024-01-22 PROCEDURE — 80053 COMPREHEN METABOLIC PANEL: CPT | Performed by: INTERNAL MEDICINE

## 2024-01-22 NOTE — PROGRESS NOTES
1245   Blood drawn from port for lab work    Right upper arm PICC line dressing change done using sterile technique.  No signs of infection noted.  Stat lock device, bio patch and tegaderm reapplied.  Tolerated well  dc to home in stable condition

## 2024-01-25 DIAGNOSIS — R52 PAIN: ICD-10-CM

## 2024-01-25 RX ORDER — HYDROCODONE BITARTRATE AND ACETAMINOPHEN 10; 325 MG/1; MG/1
1 TABLET ORAL EVERY 6 HOURS PRN
Qty: 120 TABLET | Refills: 0 | Status: SHIPPED | OUTPATIENT
Start: 2024-01-25 | End: 2024-02-28 | Stop reason: SDUPTHER

## 2024-01-29 ENCOUNTER — INFUSION (OUTPATIENT)
Dept: INFUSION THERAPY | Facility: HOSPITAL | Age: 59
End: 2024-01-29
Attending: INTERNAL MEDICINE
Payer: MEDICAID

## 2024-01-29 VITALS
HEART RATE: 95 BPM | DIASTOLIC BLOOD PRESSURE: 73 MMHG | TEMPERATURE: 97 F | RESPIRATION RATE: 18 BRPM | SYSTOLIC BLOOD PRESSURE: 112 MMHG

## 2024-01-29 DIAGNOSIS — I42.9 CARDIOMYOPATHY, UNSPECIFIED TYPE: Primary | ICD-10-CM

## 2024-01-29 LAB
ALBUMIN SERPL BCP-MCNC: 3.8 G/DL (ref 3.5–5.2)
ALP SERPL-CCNC: 56 U/L (ref 55–135)
ALT SERPL W/O P-5'-P-CCNC: 21 U/L (ref 10–44)
ANION GAP SERPL CALC-SCNC: 12 MMOL/L (ref 8–16)
ANISOCYTOSIS BLD QL SMEAR: ABNORMAL
AST SERPL-CCNC: 19 U/L (ref 10–40)
BASOPHILS # BLD AUTO: 0.05 K/UL (ref 0–0.2)
BASOPHILS NFR BLD: 0.7 % (ref 0–1.9)
BILIRUB SERPL-MCNC: 1.6 MG/DL (ref 0.1–1)
BNP SERPL-MCNC: 466 PG/ML (ref 0–99)
BUN SERPL-MCNC: 72 MG/DL (ref 6–20)
CALCIUM SERPL-MCNC: 9.3 MG/DL (ref 8.7–10.5)
CHLORIDE SERPL-SCNC: 101 MMOL/L (ref 95–110)
CO2 SERPL-SCNC: 27 MMOL/L (ref 23–29)
CREAT SERPL-MCNC: 2.6 MG/DL (ref 0.5–1.4)
DACRYOCYTES BLD QL SMEAR: ABNORMAL
DIFFERENTIAL METHOD BLD: ABNORMAL
EOSINOPHIL # BLD AUTO: 0.3 K/UL (ref 0–0.5)
EOSINOPHIL NFR BLD: 3.8 % (ref 0–8)
ERYTHROCYTE [DISTWIDTH] IN BLOOD BY AUTOMATED COUNT: 29.8 % (ref 11.5–14.5)
EST. GFR  (NO RACE VARIABLE): 21 ML/MIN/1.73 M^2
GIANT PLATELETS BLD QL SMEAR: PRESENT
GLUCOSE SERPL-MCNC: 112 MG/DL (ref 70–110)
HCT VFR BLD AUTO: 33.5 % (ref 37–48.5)
HGB BLD-MCNC: 10 G/DL (ref 12–16)
HYPOCHROMIA BLD QL SMEAR: ABNORMAL
IMM GRANULOCYTES # BLD AUTO: 0.04 K/UL (ref 0–0.04)
IMM GRANULOCYTES NFR BLD AUTO: 0.5 % (ref 0–0.5)
LYMPHOCYTES # BLD AUTO: 0.7 K/UL (ref 1–4.8)
LYMPHOCYTES NFR BLD: 9.7 % (ref 18–48)
MAGNESIUM SERPL-MCNC: 1.7 MG/DL (ref 1.6–2.6)
MCH RBC QN AUTO: 17.6 PG (ref 27–31)
MCHC RBC AUTO-ENTMCNC: 29.9 G/DL (ref 32–36)
MCV RBC AUTO: 59 FL (ref 82–98)
MONOCYTES # BLD AUTO: 0.5 K/UL (ref 0.3–1)
MONOCYTES NFR BLD: 6 % (ref 4–15)
NEUTROPHILS # BLD AUTO: 6 K/UL (ref 1.8–7.7)
NEUTROPHILS NFR BLD: 79.3 % (ref 38–73)
NRBC BLD-RTO: 0 /100 WBC
OVALOCYTES BLD QL SMEAR: ABNORMAL
PHOSPHATE SERPL-MCNC: 3.7 MG/DL (ref 2.7–4.5)
PLATELET # BLD AUTO: 166 K/UL (ref 150–450)
PLATELET BLD QL SMEAR: ABNORMAL
PMV BLD AUTO: ABNORMAL FL (ref 9.2–12.9)
POIKILOCYTOSIS BLD QL SMEAR: ABNORMAL
POLYCHROMASIA BLD QL SMEAR: ABNORMAL
POTASSIUM SERPL-SCNC: 4.2 MMOL/L (ref 3.5–5.1)
PROT SERPL-MCNC: 6.9 G/DL (ref 6–8.4)
RBC # BLD AUTO: 5.67 M/UL (ref 4–5.4)
SCHISTOCYTES BLD QL SMEAR: ABNORMAL
SCHISTOCYTES BLD QL SMEAR: PRESENT
SODIUM SERPL-SCNC: 140 MMOL/L (ref 136–145)
SPHEROCYTES BLD QL SMEAR: ABNORMAL
TARGETS BLD QL SMEAR: ABNORMAL
WBC # BLD AUTO: 7.56 K/UL (ref 3.9–12.7)

## 2024-01-29 PROCEDURE — 83735 ASSAY OF MAGNESIUM: CPT | Performed by: INTERNAL MEDICINE

## 2024-01-29 PROCEDURE — 80053 COMPREHEN METABOLIC PANEL: CPT | Performed by: INTERNAL MEDICINE

## 2024-01-29 PROCEDURE — 36415 COLL VENOUS BLD VENIPUNCTURE: CPT | Performed by: INTERNAL MEDICINE

## 2024-01-29 PROCEDURE — 83880 ASSAY OF NATRIURETIC PEPTIDE: CPT | Performed by: INTERNAL MEDICINE

## 2024-01-29 PROCEDURE — 85025 COMPLETE CBC W/AUTO DIFF WBC: CPT | Performed by: INTERNAL MEDICINE

## 2024-01-29 PROCEDURE — 36592 COLLECT BLOOD FROM PICC: CPT | Mod: 59

## 2024-01-29 PROCEDURE — 84100 ASSAY OF PHOSPHORUS: CPT | Performed by: INTERNAL MEDICINE

## 2024-01-29 NOTE — PROGRESS NOTES
1200 Right upper arm PICC line dressing change done using sterile technique.  No signs of infection noted  stat lock device, bio patch and tegaderm dressing reapplied.  Tolerated well

## 2024-01-29 NOTE — HOSPITAL COURSE
Admitted to Hospital Medicine with acute on chronic heart failure exacerbation. Found to have elevated troponin on admission, however below her baseline. She was started on Lasix 80 mg IV BID with improvement in symptoms. HTS to follow, consulted for initiation of inpatient evaluation vs outpatient evaluation. HTS to continue evaluation in outpatient setting per patient wishes. Transitioning to home dose of Lasix. Medically stable for discharge home.  
General Sunscreen Counseling: I recommended a broad spectrum sunscreen with a SPF of 30 or higher.  I explained that SPF 30 sunscreens block approximately 97 percent of the sun's harmful rays.  Sunscreens should be applied at least 15 minutes prior to expected sun exposure and then every 2 hours after that as long as sun exposure continues. If swimming or exercising sunscreen should be reapplied every 45 minutes to an hour after getting wet or sweating.  One ounce, or the equivalent of a shot glass full of sunscreen, is adequate to protect the skin not covered by a bathing suit. I also recommended a lip balm with a sunscreen as well. Sun protective clothing can be used in lieu of sunscreen but must be worn the entire time you are exposed to the sun's rays.
Detail Level: Detailed

## 2024-01-29 NOTE — PROGRESS NOTES
1215  Blood drawn from PICC line  Flushed easily.  Pt will flush with heparin at home.  Pt discharged in stable condition

## 2024-01-30 ENCOUNTER — PATIENT MESSAGE (OUTPATIENT)
Dept: FAMILY MEDICINE | Facility: CLINIC | Age: 59
End: 2024-01-30
Payer: MEDICAID

## 2024-01-30 ENCOUNTER — TELEPHONE (OUTPATIENT)
Dept: FAMILY MEDICINE | Facility: CLINIC | Age: 59
End: 2024-01-30
Payer: MEDICAID

## 2024-01-30 NOTE — TELEPHONE ENCOUNTER
----- Message from Gustavo Allen sent at 2024  2:23 PM CST -----  Contact: [facility]  Hafsa Hawley  MRN: [id number]  : 1965  PCP: Cristobal Ann  Home Phone      [phone]  Work Phone      Not on file.  Mobile          [phone]      MESSAGE:   Patient currently has diabetes, but she needs a breast screening. She also needs a overall health check        [phone]

## 2024-02-01 ENCOUNTER — TELEPHONE (OUTPATIENT)
Dept: FAMILY MEDICINE | Facility: CLINIC | Age: 59
End: 2024-02-01
Payer: MEDICAID

## 2024-02-01 ENCOUNTER — TELEPHONE (OUTPATIENT)
Dept: OBSTETRICS AND GYNECOLOGY | Facility: CLINIC | Age: 59
End: 2024-02-01
Payer: MEDICAID

## 2024-02-01 NOTE — TELEPHONE ENCOUNTER
----- Message from Mia Reinoso MA sent at 2/1/2024 11:57 AM CST -----  Contact: self  Hafsa Hawley  MRN: [id number]  Home Phone      [phone]  Work Phone      Not on file.  Mobile          [phone]    Patient Care Team:  Cristobal Ann MD as PCP - General (Family Medicine)  Sameer Chacko MD as Consulting Physician (Cardiothoracic Surgery)  Miriam Lares LPN as Care Coordinator  Sandra Hernandes MD as Consulting Physician (Palliative Medicine)  Kira Hawkins RN as Registered Nurse (Palliative Medicine)  [facility] [place] (Ophthalmology)  OB? No  What phone number can you be reached at? [phone]  Message: Would like to make appt for today for possible uti / burning.  Patient will be a NP.

## 2024-02-01 NOTE — TELEPHONE ENCOUNTER
----- Message from Gustavo Allen sent at 2024 11:53 AM CST -----  Contact: self  Hafsa Hawley  MRN: [id number]  : 1965  PCP: Cristobal Ann  Home Phone      [phone]  Work Phone      Not on file.  Mobile          [phone]      MESSAGE:   Patient is stating she is having vaginal irritation and discharge. Please advise      [phone]

## 2024-02-02 ENCOUNTER — HOSPITAL ENCOUNTER (EMERGENCY)
Facility: HOSPITAL | Age: 59
Discharge: HOME | End: 2024-02-02
Attending: STUDENT IN AN ORGANIZED HEALTH CARE EDUCATION/TRAINING PROGRAM
Payer: MEDICAID

## 2024-02-02 ENCOUNTER — OFFICE VISIT (OUTPATIENT)
Dept: OBSTETRICS AND GYNECOLOGY | Facility: CLINIC | Age: 59
End: 2024-02-02
Payer: MEDICAID

## 2024-02-02 VITALS
SYSTOLIC BLOOD PRESSURE: 108 MMHG | BODY MASS INDEX: 29.02 KG/M2 | DIASTOLIC BLOOD PRESSURE: 62 MMHG | WEIGHT: 180.56 LBS | HEART RATE: 105 BPM | HEIGHT: 66 IN

## 2024-02-02 VITALS
TEMPERATURE: 98 F | BODY MASS INDEX: 28.77 KG/M2 | HEART RATE: 101 BPM | DIASTOLIC BLOOD PRESSURE: 71 MMHG | WEIGHT: 178.25 LBS | OXYGEN SATURATION: 96 % | SYSTOLIC BLOOD PRESSURE: 123 MMHG | RESPIRATION RATE: 20 BRPM

## 2024-02-02 DIAGNOSIS — N89.8 VAGINAL IRRITATION: Primary | ICD-10-CM
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
## 2024-02-02 PROCEDURE — 3008F BODY MASS INDEX DOCD: CPT | Mod: CPTII,,, | Performed by: OBSTETRICS & GYNECOLOGY

## 2024-02-02 PROCEDURE — 99999 PR PBB SHADOW E&M-EST. PATIENT-LVL V: CPT | Mod: PBBFAC,,, | Performed by: OBSTETRICS & GYNECOLOGY

## 2024-02-02 PROCEDURE — 4010F ACE/ARB THERAPY RXD/TAKEN: CPT | Mod: CPTII,,, | Performed by: OBSTETRICS & GYNECOLOGY

## 2024-02-02 PROCEDURE — 3078F DIAST BP <80 MM HG: CPT | Mod: CPTII,,, | Performed by: OBSTETRICS & GYNECOLOGY

## 2024-02-02 PROCEDURE — 3074F SYST BP LT 130 MM HG: CPT | Mod: CPTII,,, | Performed by: OBSTETRICS & GYNECOLOGY

## 2024-02-02 PROCEDURE — 99213 OFFICE O/P EST LOW 20 MIN: CPT | Mod: S$PBB,,, | Performed by: OBSTETRICS & GYNECOLOGY

## 2024-02-02 PROCEDURE — 81514 NFCT DS BV&VAGINITIS DNA ALG: CPT | Performed by: OBSTETRICS & GYNECOLOGY

## 2024-02-02 PROCEDURE — 99215 OFFICE O/P EST HI 40 MIN: CPT | Mod: PBBFAC,25 | Performed by: OBSTETRICS & GYNECOLOGY

## 2024-02-02 PROCEDURE — 1159F MED LIST DOCD IN RCRD: CPT | Mod: CPTII,,, | Performed by: OBSTETRICS & GYNECOLOGY

## 2024-02-02 PROCEDURE — 99283 EMERGENCY DEPT VISIT LOW MDM: CPT | Mod: 25,27

## 2024-02-02 PROCEDURE — 1160F RVW MEDS BY RX/DR IN RCRD: CPT | Mod: CPTII,,, | Performed by: OBSTETRICS & GYNECOLOGY

## 2024-02-02 RX ORDER — MUPIROCIN 20 MG/G
OINTMENT TOPICAL 3 TIMES DAILY
Qty: 22 G | Refills: 0 | Status: SHIPPED | OUTPATIENT
Start: 2024-02-02 | End: 2024-02-27

## 2024-02-02 RX ORDER — TERCONAZOLE 8 MG/G
1 CREAM VAGINAL DAILY
Qty: 20 G | Refills: 1 | Status: ON HOLD | OUTPATIENT
Start: 2024-02-02 | End: 2024-05-06 | Stop reason: CLARIF

## 2024-02-02 NOTE — ED PROVIDER NOTES
"Encounter Date: 2/2/2024       History     Chief Complaint   Patient presents with    Foreign Body in Skin     This note is dictated on M*Modal word recognition program.  There are word recognition mistakes and grammatical errors that are occasionally missed on review.     Hafsa Hawley is a 58 y.o. female presents to ER today with complaints of possible foreign body to left upper arm.  Patient reports she was changing out her dex com blood glucose monitoring device, when the needle broke off and stayed in her left upper arm according to patient.  Patient reports she feels like a foreign body in her left upper arm at this time.      The history is provided by the patient.     Review of patient's allergies indicates:   Allergen Reactions    Penicillins Hives and Other (See Comments)    Iodinated contrast media Nausea And Vomiting    Oxycodone-acetaminophen Other (See Comments)     Nausea, Dizziness, Anxiety.  "I don't like how it makes me feel."   Given Hydromorphone 0.5mg IVP  Without problems.  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 Metabolism of other drugs not listed here may also be impacted by UGT1A1 enzyme activity.       Tramadol Nausea And Vomiting and Other (See Comments)     Other reaction(s): Other (See Comments)    Valsartan Other (See Comments)     Past Medical History:   Diagnosis Date    Anemia     Arthritis     Atrial fibrillation     OAC    " Chronic respiratory failure with hypoxia, on home oxygen therapy     2L with activity, off at rest.  Per Pulm  no overt evidence of ILD or COPD on PFTs and CT to explain O2 needs.    CKD (chronic kidney disease), stage IV 05/08/2018    Congestive heart failure     s/p AICD placement,    Deep vein thrombosis     Depression     elevated bilirubin d/t Gilbert's syndrome     confirmed by [facility] genetic testing, evaluated by hepatology    Encounter for blood transfusion     GERD (gastroesophageal reflux disease)     Hypertension     Pheochromocytoma, malignant     Right kidney mass     Sleep apnea     Thalassemia trait, alpha     Thyroid disease     Type 2 diabetes mellitus with hyperglycemia, without long-term current use of insulin 08/13/2020     Past Surgical History:   Procedure Laterality Date    APPENDECTOMY      BONE MARROW BIOPSY      CARDIAC DEFIBRILLATOR PLACEMENT Left 12/2016    CARDIAC ELECTROPHYSIOLOGY MAPPING AND ABLATION      CARDIAC ELECTROPHYSIOLOGY MAPPING AND ABLATION      COLONOSCOPY N/A 5/6/2022    Procedure: COLONOSCOPY;  Surgeon: Arely Betancourt MD;  Location: [facility] ([address] [place] [address]);  Service: Endoscopy;  Laterality: N/A;  heart transplant candidate/ EF 25% - 2nd floor/ defib - Biotronik - ERW  Eliquis - per Dr. Cortez with [facility] [place], Pt ok to hold Eliquis x 2 days prior-see media tab-outside correspondence dated 12/30/21  - [facility]  verbal instructions/portal instructions/email instructions - s    EYE SURGERY      due to running tears    FRACTURE SURGERY Left     hand 5th digit    HYSTERECTOMY      KNEE SURGERY Left 2016    hematoma    LIVER BIOPSY  10/24/2018    Minimal steatosis, predominantly macrovesicular, 1%, Minimal nonspecific portal inflammation, no fibrosis. No findings on biopsy to explain elevated bilirubin levels. Could be d/t Gilbert's =?- hemolysis    RIGHT HEART CATHETERIZATION Right 12/07/2021    Procedure: INSERTION, CATHETER, RIGHT HEART;  Surgeon: Irving Cardenas MD;  Location: [facility]  CATH LAB;  Service: Cardiology;  Laterality: Right;    RIGHT HEART CATHETERIZATION Right 2022    Procedure: INSERTION, CATHETER, RIGHT HEART;  Surgeon: Burke Camilo MD;  Location: [facility] CATH LAB;  Service: Cardiology;  Laterality: Right;    RIGHT HEART CATHETERIZATION Right 3/29/2023    Procedure: INSERTION, CATHETER, RIGHT HEART;  Surgeon: Katie Liriano DO;  Location: [facility] CATH LAB;  Service: Cardiology;  Laterality: Right;    TRANSJUGULAR BIOPSY OF LIVER N/A 10/24/2018    Procedure: BIOPSY, LIVER, TRANSJUGULAR APPROACH;  Surgeon: Carmen Diagnostic Provider;  Location: [facility] OR [address] [place] [address];  Service: Radiology;  Laterality: N/A;     Family History   Problem Relation Age of Onset    Cancer Mother         pancreatic CA early 50's    Heart disease Father          MI in late 50's    Hypertension Father     Heart attack Father     Heart disease Sister     Heart disease Brother     Cirrhosis Brother         alcoholic    Heart disease Sister     Heart disease Brother     Hypertension Brother     Diabetes Brother      Social History     Tobacco Use    Smoking status: Never    Smokeless tobacco: Never   Substance Use Topics    Alcohol use: Not Currently     Comment: up to 1 yr ago drank 2-3 drinks on occasion but sporadic    Drug use: No     Review of Systems   Constitutional: Negative.    HENT: Negative.     Eyes: Negative.    Respiratory: Negative.     Cardiovascular: Negative.    Gastrointestinal: Negative.    Endocrine: Negative.    Genitourinary: Negative.    Musculoskeletal: Negative.    Skin:  Positive for wound.        Possible foreign body as per HPI.   Allergic/Immunologic: Negative.    Neurological: Negative.    Hematological: Negative.    Psychiatric/Behavioral: Negative.         Physical Exam     Initial Vitals [24 1717]   BP Pulse Resp Temp SpO2   123/71 101 20 98.3 °F (36.8 °C) 96 %      MAP       --         Physical Exam    Constitutional: No distress.   HENT:   Right Ear: External  ear normal.   Left Ear: External ear normal.   Eyes: Pupils are equal, round, and reactive to light. Right eye exhibits no discharge. Left eye exhibits no discharge.   Neck: Neck supple.   Cardiovascular:  Normal rate.     Exam reveals no gallop and no friction rub.       No murmur heard.  Pulmonary/Chest: Breath sounds normal. No respiratory distress. She has no wheezes. She has no rhonchi. She has no rales.   Abdominal: Abdomen is soft. Bowel sounds are normal.   Musculoskeletal:         General: No tenderness or edema.      Cervical back: Neck supple.     Neurological: She is alert and oriented to person, place, and time. GCS score is 15. GCS eye subscore is 4. GCS verbal subscore is 5. GCS motor subscore is 6.   Skin: Skin is warm. Capillary refill takes less than 2 seconds.   Psychiatric: She has a normal mood and affect. Her behavior is normal. Judgment and thought content normal.         ED Course   Procedures  Labs Reviewed - No data to display       Imaging Results              X-Ray Humerus 2 View Left (Final result)  Result time 02/02/24 17:52:34      Final result by Aravind Farrell Jr., MD (02/02/24 17:52:34)                   Impression:      Negative radiographs of the left upper arm.      Electronically signed by: Aravind Farrell MD  Date:    02/02/2024  Time:    17:52               Narrative:    EXAMINATION:  XR HUMERUS 2 VIEW LEFT    CLINICAL HISTORY:  Other foreign body or object entering through skin, initial encounter    TECHNIQUE:  AP lateral and oblique images of the left upper arm are provided.    COMPARISON:  None    FINDINGS:  The left humerus appears intact without fracture or bone loss.  Abnormalities at the elbow or shoulder joint are not seen.  On this study a foreign body within the soft tissues is not identified.                                       Medications - No data to display  Medical Decision Making  Differential diagnosis include foreign body skin    X-ray of left forearm  reveals no foreign body, no acute abnormality.    Will prescribe patient Bactroban to place over area that Dexcom was located.  Patient instructed to follow-up with PCP for further evaluation and treatment of possible foreign body.  Patient stable at time of discharge in no acute distress.  No life-threatening illnesses were found during ER visit today.  Patient was instructed to follow-up with PCP or other recommended specialist within the next 48-72 hours.  Patient was instructed to return to ER immediately for any worsening or concerning symptoms.  All discharge instructions discussed with patient, and patient agrees to comply with discharge instructions given today.     Amount and/or Complexity of Data Reviewed  Radiology: ordered.    Risk  Prescription drug management.                                      Clinical Impression:  Final diagnoses:  [W45.8XXA] Foreign body entering through skin (Primary)          ED Disposition Condition    Discharge Stable          ED Prescriptions       Medication Sig Dispense Start Date End Date Auth. Provider    mupirocin (BACTROBAN) 2 % ointment Apply topically 3 (three) times daily. for 5 days 22 g 2/2/2024 2/24/2024 Adriano Brice NP          Follow-up Information       Follow up With Specialties Details Why Contact Info    Cristobal Ann MD Family Medicine Schedule an appointment as soon as possible for a visit in 3 days  [address] [address]  [phone]               Adriano Brice NP  02/02/24 [id number]

## 2024-02-02 NOTE — PROGRESS NOTES
Subjective:       Patient ID: Hafsa Hawley is a 58 y.o. female.    Chief Complaint:  Vaginal Discharge (Pt c/o itching and discharge over the last week )      History of Present Illness  Patient presents stating that she has had a discharge somewhat brownish in color for about 1 week.  She has both internal and external itching and burning.  She denies any noticeable odor.  She has not treated with any over-the-counter medication.    Menstrual History:  OB History          2    Para   2    Term   2            AB        Living             SAB        IAB        Ectopic        Multiple        Live Births                    Menarche age:  Patient's last menstrual period was 10/23/2001.         Review of Systems  Review of Systems   Constitutional:  Positive for activity change, appetite change, chills, diaphoresis, fatigue and unexpected weight change. Negative for fever.   HENT:  Positive for congestion. Negative for dental problem, drooling, ear discharge, ear pain, facial swelling, hearing loss, mouth sores, nosebleeds, postnasal drip, rhinorrhea, sinus pressure, sneezing, sore throat, tinnitus, trouble swallowing and voice change.    Eyes:  Positive for photophobia, pain, discharge, redness and itching. Negative for visual disturbance.   Respiratory:  Positive for apnea, cough, chest tightness, shortness of breath and wheezing. Negative for choking and stridor.    Cardiovascular:  Positive for chest pain and palpitations. Negative for leg swelling.   Gastrointestinal:  Positive for abdominal pain, constipation, diarrhea, nausea and vomiting. Negative for abdominal distention, anal bleeding, blood in stool and rectal pain.   Endocrine: Negative for cold intolerance, heat intolerance, polydipsia, polyphagia and polyuria.   Genitourinary:  Negative for decreased urine volume, difficulty urinating, dyspareunia, dysuria, enuresis, flank pain, frequency, genital sores, hematuria, menstrual problem,  pelvic pain, urgency, vaginal bleeding, vaginal discharge and vaginal pain.   Musculoskeletal:  Positive for arthralgias, back pain and myalgias. Negative for gait problem, joint swelling, neck pain and neck stiffness.   Skin:  Negative for color change, pallor, rash and wound.   Allergic/Immunologic: Positive for environmental allergies. Negative for food allergies and immunocompromised state.   Neurological:  Positive for dizziness, tremors, weakness and light-headedness. Negative for seizures, syncope, facial asymmetry, speech difficulty, numbness and headaches.   Hematological:  Negative for adenopathy. Bruises/bleeds easily.   Psychiatric/Behavioral:  Positive for sleep disturbance. Negative for agitation, behavioral problems, confusion, decreased concentration, dysphoric mood, hallucinations, self-injury and suicidal ideas. The patient is nervous/anxious. The patient is not hyperactive.          Objective:      Physical Exam  Vitals and nursing note reviewed. Exam conducted with a chaperone present.   Abdominal:      Hernia: There is no hernia in the left inguinal area or right inguinal area.   Genitourinary:     General: Normal vulva.      Labia:         Right: No rash, tenderness, lesion or injury.         Left: No rash, tenderness, lesion or injury.       Urethra: No prolapse, urethral pain, urethral swelling or urethral lesion.      Vagina: No signs of injury and foreign body. No vaginal discharge, erythema, tenderness or bleeding.      Cervix: No cervical motion tenderness, discharge or friability.      Uterus: Not deviated, not enlarged, not fixed and not tender.       Adnexa:         Right: No mass, tenderness or fullness.          Left: No mass, tenderness or fullness.        Rectum: No external hemorrhoid.   Lymphadenopathy:      Lower Body: No right inguinal adenopathy. No left inguinal adenopathy.         Assessment:        1. Vaginal irritation                Plan:         Hafsa was seen today for  vaginal discharge.    Diagnoses and all orders for this visit:    Vaginal irritation  -     VAGINOSIS SCREEN BY DNA PROBE

## 2024-02-02 NOTE — ED TRIAGE NOTES
58 y.o. female presents to ER Room/bed info not found   Chief Complaint   Patient presents with    Foreign Body in Skin   .   Pt reports was changing out her dexcom and the needle broke in her arm, occurred around 2pm today

## 2024-02-03 LAB
BACTERIAL VAGINOSIS DNA: POSITIVE
CANDIDA GLABRATA DNA: NEGATIVE
CANDIDA KRUSEI DNA: NEGATIVE
CANDIDA RRNA VAG QL PROBE: NEGATIVE
T VAGINALIS RRNA GENITAL QL PROBE: POSITIVE

## 2024-02-05 ENCOUNTER — INFUSION (OUTPATIENT)
Dept: INFUSION THERAPY | Facility: HOSPITAL | Age: 59
End: 2024-02-05
Attending: INTERNAL MEDICINE
Payer: MEDICAID

## 2024-02-05 ENCOUNTER — OFFICE VISIT (OUTPATIENT)
Dept: RHEUMATOLOGY | Facility: CLINIC | Age: 59
End: 2024-02-05
Payer: MEDICAID

## 2024-02-05 VITALS
DIASTOLIC BLOOD PRESSURE: 71 MMHG | SYSTOLIC BLOOD PRESSURE: 114 MMHG | BODY MASS INDEX: 28.42 KG/M2 | HEIGHT: 66 IN | HEART RATE: 56 BPM | WEIGHT: 176.81 LBS

## 2024-02-05 VITALS
HEART RATE: 100 BPM | SYSTOLIC BLOOD PRESSURE: 117 MMHG | TEMPERATURE: 97 F | RESPIRATION RATE: 18 BRPM | DIASTOLIC BLOOD PRESSURE: 74 MMHG

## 2024-02-05 DIAGNOSIS — I42.9 CARDIOMYOPATHY, UNSPECIFIED TYPE: Primary | ICD-10-CM

## 2024-02-05 DIAGNOSIS — M25.461 EFFUSION OF RIGHT KNEE: ICD-10-CM

## 2024-02-05 DIAGNOSIS — N18.4 STAGE 4 CHRONIC KIDNEY DISEASE: ICD-10-CM

## 2024-02-05 DIAGNOSIS — M1A.0610 CHRONIC GOUT OF RIGHT KNEE, UNSPECIFIED CAUSE: ICD-10-CM

## 2024-02-05 LAB
ALBUMIN SERPL BCP-MCNC: 3.9 G/DL (ref 3.5–5.2)
ALP SERPL-CCNC: 60 U/L (ref 55–135)
ALT SERPL W/O P-5'-P-CCNC: 19 U/L (ref 10–44)
ANION GAP SERPL CALC-SCNC: 13 MMOL/L (ref 8–16)
AST SERPL-CCNC: 21 U/L (ref 10–40)
BASOPHILS # BLD AUTO: 0.05 K/UL (ref 0–0.2)
BASOPHILS NFR BLD: 0.5 % (ref 0–1.9)
BILIRUB SERPL-MCNC: 1.9 MG/DL (ref 0.1–1)
BNP SERPL-MCNC: 683 PG/ML (ref 0–99)
BUN SERPL-MCNC: 84 MG/DL (ref 6–20)
CALCIUM SERPL-MCNC: 9.8 MG/DL (ref 8.7–10.5)
CHLORIDE SERPL-SCNC: 100 MMOL/L (ref 95–110)
CO2 SERPL-SCNC: 26 MMOL/L (ref 23–29)
CREAT SERPL-MCNC: 3 MG/DL (ref 0.5–1.4)
DIFFERENTIAL METHOD BLD: ABNORMAL
EOSINOPHIL # BLD AUTO: 0.6 K/UL (ref 0–0.5)
EOSINOPHIL NFR BLD: 6.6 % (ref 0–8)
ERYTHROCYTE [DISTWIDTH] IN BLOOD BY AUTOMATED COUNT: 30 % (ref 11.5–14.5)
EST. GFR  (NO RACE VARIABLE): 17 ML/MIN/1.73 M^2
GLUCOSE SERPL-MCNC: 115 MG/DL (ref 70–110)
HCT VFR BLD AUTO: 32.7 % (ref 37–48.5)
HGB BLD-MCNC: 10.1 G/DL (ref 12–16)
IMM GRANULOCYTES # BLD AUTO: 0.06 K/UL (ref 0–0.04)
IMM GRANULOCYTES NFR BLD AUTO: 0.7 % (ref 0–0.5)
LYMPHOCYTES # BLD AUTO: 0.6 K/UL (ref 1–4.8)
LYMPHOCYTES NFR BLD: 6 % (ref 18–48)
MAGNESIUM SERPL-MCNC: 1.6 MG/DL (ref 1.6–2.6)
MCH RBC QN AUTO: 18.3 PG (ref 27–31)
MCHC RBC AUTO-ENTMCNC: 30.9 G/DL (ref 32–36)
MCV RBC AUTO: 59 FL (ref 82–98)
MONOCYTES # BLD AUTO: 0.5 K/UL (ref 0.3–1)
MONOCYTES NFR BLD: 5.2 % (ref 4–15)
NEUTROPHILS # BLD AUTO: 7.5 K/UL (ref 1.8–7.7)
NEUTROPHILS NFR BLD: 81 % (ref 38–73)
NRBC BLD-RTO: 0 /100 WBC
OHS CV AF BURDEN PERCENT: < 1
OHS CV DC REMOTE DEVICE TYPE: NORMAL
OHS CV RV PACING PERCENT: 0 %
PHOSPHATE SERPL-MCNC: 4.1 MG/DL (ref 2.7–4.5)
PLATELET # BLD AUTO: 151 K/UL (ref 150–450)
PMV BLD AUTO: ABNORMAL FL (ref 9.2–12.9)
POTASSIUM SERPL-SCNC: 4.5 MMOL/L (ref 3.5–5.1)
PROT SERPL-MCNC: 7.1 G/DL (ref 6–8.4)
RBC # BLD AUTO: 5.53 M/UL (ref 4–5.4)
SODIUM SERPL-SCNC: 139 MMOL/L (ref 136–145)
WBC # BLD AUTO: 9.23 K/UL (ref 3.9–12.7)

## 2024-02-05 PROCEDURE — 99999PBSHW PR PBB SHADOW TECHNICAL ONLY FILED TO HB: Mod: PBBFAC,,,

## 2024-02-05 PROCEDURE — 36415 COLL VENOUS BLD VENIPUNCTURE: CPT | Performed by: INTERNAL MEDICINE

## 2024-02-05 PROCEDURE — 85025 COMPLETE CBC W/AUTO DIFF WBC: CPT | Performed by: INTERNAL MEDICINE

## 2024-02-05 PROCEDURE — 83735 ASSAY OF MAGNESIUM: CPT | Performed by: INTERNAL MEDICINE

## 2024-02-05 PROCEDURE — 80053 COMPREHEN METABOLIC PANEL: CPT | Performed by: INTERNAL MEDICINE

## 2024-02-05 PROCEDURE — 4010F ACE/ARB THERAPY RXD/TAKEN: CPT | Mod: CPTII,,, | Performed by: STUDENT IN AN ORGANIZED HEALTH CARE EDUCATION/TRAINING PROGRAM

## 2024-02-05 PROCEDURE — 20610 DRAIN/INJ JOINT/BURSA W/O US: CPT | Mod: PBBFAC,PN | Performed by: STUDENT IN AN ORGANIZED HEALTH CARE EDUCATION/TRAINING PROGRAM

## 2024-02-05 PROCEDURE — 99215 OFFICE O/P EST HI 40 MIN: CPT | Mod: S$PBB,25,, | Performed by: STUDENT IN AN ORGANIZED HEALTH CARE EDUCATION/TRAINING PROGRAM

## 2024-02-05 PROCEDURE — 83880 ASSAY OF NATRIURETIC PEPTIDE: CPT | Performed by: INTERNAL MEDICINE

## 2024-02-05 PROCEDURE — 99215 OFFICE O/P EST HI 40 MIN: CPT | Mod: PBBFAC,PN | Performed by: STUDENT IN AN ORGANIZED HEALTH CARE EDUCATION/TRAINING PROGRAM

## 2024-02-05 PROCEDURE — 3074F SYST BP LT 130 MM HG: CPT | Mod: CPTII,,, | Performed by: STUDENT IN AN ORGANIZED HEALTH CARE EDUCATION/TRAINING PROGRAM

## 2024-02-05 PROCEDURE — 3008F BODY MASS INDEX DOCD: CPT | Mod: CPTII,,, | Performed by: STUDENT IN AN ORGANIZED HEALTH CARE EDUCATION/TRAINING PROGRAM

## 2024-02-05 PROCEDURE — 3078F DIAST BP <80 MM HG: CPT | Mod: CPTII,,, | Performed by: STUDENT IN AN ORGANIZED HEALTH CARE EDUCATION/TRAINING PROGRAM

## 2024-02-05 PROCEDURE — 84100 ASSAY OF PHOSPHORUS: CPT | Performed by: INTERNAL MEDICINE

## 2024-02-05 PROCEDURE — 99999 PR PBB SHADOW E&M-EST. PATIENT-LVL V: CPT | Mod: PBBFAC,,, | Performed by: STUDENT IN AN ORGANIZED HEALTH CARE EDUCATION/TRAINING PROGRAM

## 2024-02-05 PROCEDURE — 36592 COLLECT BLOOD FROM PICC: CPT | Mod: 59

## 2024-02-05 RX ORDER — TRIAMCINOLONE ACETONIDE 40 MG/ML
40 INJECTION, SUSPENSION INTRA-ARTICULAR; INTRAMUSCULAR
Status: DISCONTINUED | OUTPATIENT
Start: 2024-02-05 | End: 2024-02-05 | Stop reason: HOSPADM

## 2024-02-05 RX ORDER — LIDOCAINE HYDROCHLORIDE 10 MG/ML
2 INJECTION, SOLUTION EPIDURAL; INFILTRATION; INTRACAUDAL; PERINEURAL
Status: DISCONTINUED | OUTPATIENT
Start: 2024-02-05 | End: 2024-02-05 | Stop reason: HOSPADM

## 2024-02-05 RX ADMIN — LIDOCAINE HYDROCHLORIDE 2 ML: 10 INJECTION, SOLUTION EPIDURAL; INFILTRATION; INTRACAUDAL; PERINEURAL at 01:02

## 2024-02-05 RX ADMIN — TRIAMCINOLONE ACETONIDE 40 MG: 40 INJECTION, SUSPENSION INTRA-ARTICULAR; INTRAMUSCULAR at 01:02

## 2024-02-05 NOTE — PROCEDURES
Large Joint Aspiration/Injection: R knee    Date/Time: 2/5/2024 1:00 PM    Performed by: Terese Patel MD  Authorized by: Terese Patel MD    Indications:  Arthritis and pain  Site marked: the procedure site was marked      Local anesthesia used?: Yes    Local anesthetic:  Topical anesthetic  Approach:  Anterior  Location:  Knee  Site:  R knee  Medications:  2 mL LIDOcaine (PF) 10 mg/ml (1%) 10 mg/mL (1 %); 40 mg triamcinolone acetonide 40 mg/mL  Patient tolerance:  Patient tolerated the procedure well with no immediate complications

## 2024-02-05 NOTE — PROGRESS NOTES
RHEUMATOLOGY OUTPATIENT CLINIC NOTE    2/5/2024    Attending Rheumatologist: Terese Patel  Primary Care Provider: Cristobal Ann MD   Physician Requesting Consultation: Wendie Michel PA-C  [address] [place] [address]  Chief Complaint/Reason For Consultation:  Gout      Subjective:       HPI  Hafsa Hawley is a 58 y.o. Black or  female with extensive medical history such as CKD stage 4, pulmonary hypertension, diabetes mellitus, HFrEF NYHA class 4 on contiuous dobutamin infusions, CAD, hx of DVT on eliquis, GERD.     She started having different joint pain and swelling since earlier 2023. She noted gout episodes in both feet in April 2023. Then started having right knee swelling since May 2023. Has been seen in the ED twice for right knee swelling. Initial arthrocentesis showed intra and extracellular urate crystals, . Then had repeated right knee arthocentesis in the ED in 10/2023 that did not show any urate crystals.   Her SUA has been as high as 13.6.   Has been on allopurinol 300 mg daily since 4/2023. She was on probenecid since 10/2023 until December 2023-January 2024 when it was d/c'd due to CKD.   Started on prednisone 5 mg daily since 11/2023.     Since started prednisone daily, she has not have a gout flare. She does report intermittent right knee swelling and pain, feels that knee is going to give out. She has never had a steroid injection in the knee.   Has bilateral diabetic neuropathy and she takes pregabalin 50 mg at night. She is also on hydrocodone/APAP every 6 hours as needed for chronic pain     Component      Latest Ref Rng 5/17/2021 6/23/2021 2/15/2022 5/13/2022 5/31/2022 4/2/2023 4/26/2023   Uric Acid      2.4 - 5.7 mg/dL 10.2 (H)  9.4 (H)  10.0 (H)  12.2 (H)  10.7 (H)  11.7 (H)  13.6 (H)      Component      Latest Ref Rng 10/7/2023 11/9/2023   Uric Acid      2.4 - 5.7 mg/dL 10.0 (H)  4.3           Chronic comorbid conditions affecting  medical decision making today:  Past Medical History:   Diagnosis Date    Allergy     Anemia     Arthritis     Atrial fibrillation     OAC    Chronic respiratory failure with hypoxia, on home oxygen therapy     2L with activity, off at rest.  Per Pulm  no overt evidence of ILD or COPD on PFTs and CT to explain O2 needs.    CKD (chronic kidney disease), stage IV 05/08/2018    Congestive heart failure     s/p AICD placement,    Deep vein thrombosis     Depression     elevated bilirubin d/t Gilbert's syndrome     confirmed by [facility] genetic testing, evaluated by hepatology    Encounter for blood transfusion     GERD (gastroesophageal reflux disease)     Hypertension     Pheochromocytoma, malignant     Right kidney mass     Sleep apnea     Thalassemia trait, alpha     Thyroid disease     Type 2 diabetes mellitus with hyperglycemia, without long-term current use of insulin 08/13/2020     Past Surgical History:   Procedure Laterality Date    APPENDECTOMY      BONE MARROW BIOPSY      CARDIAC DEFIBRILLATOR PLACEMENT Left 12/2016    CARDIAC ELECTROPHYSIOLOGY MAPPING AND ABLATION      CARDIAC ELECTROPHYSIOLOGY MAPPING AND ABLATION      COLONOSCOPY N/A 5/6/2022    Procedure: COLONOSCOPY;  Surgeon: Arely Betancourt MD;  Location: [facility] ([address] [place] [address]);  Service: Endoscopy;  Laterality: N/A;  heart transplant candidate/ EF 25% - 2nd floor/ defib - Biotronik - ER[facility]  Eliquis - per Dr. Cortez with [facility], Pt ok to hold Eliquis x 2 days prior-see media tab-outside correspondence dated 12/30/21  - [facility]  verbal instructions/portal instructions/email instructions - s    EYE SURGERY      due to running tears    FRACTURE SURGERY Left     hand 5th digit    HYSTERECTOMY      KNEE SURGERY Left 2016    hematoma    LIVER BIOPSY  10/24/2018    Minimal steatosis, predominantly macrovesicular, 1%, Minimal nonspecific portal inflammation, no fibrosis. No findings on biopsy to explain elevated bilirubin levels. Could be d/t Gilbert's =?- hemolysis     RIGHT HEART CATHETERIZATION Right 2021    Procedure: INSERTION, CATHETER, RIGHT HEART;  Surgeon: Irving Cardenas MD;  Location: [facility] CATH LAB;  Service: Cardiology;  Laterality: Right;    RIGHT HEART CATHETERIZATION Right 2022    Procedure: INSERTION, CATHETER, RIGHT HEART;  Surgeon: Burke Camilo MD;  Location: [facility] CATH LAB;  Service: Cardiology;  Laterality: Right;    RIGHT HEART CATHETERIZATION Right 3/29/2023    Procedure: INSERTION, CATHETER, RIGHT HEART;  Surgeon: Katie Liriano DO;  Location: [facility] CATH LAB;  Service: Cardiology;  Laterality: Right;    TRANSJUGULAR BIOPSY OF LIVER N/A 10/24/2018    Procedure: BIOPSY, LIVER, TRANSJUGULAR APPROACH;  Surgeon: Carmen Diagnostic Provider;  Location: [facility] OR [address] [place] [address];  Service: Radiology;  Laterality: N/A;     Family History   Problem Relation Age of Onset    Cancer Mother         pancreatic CA early 50's    Heart disease Father          MI in late 50's    Hypertension Father     Heart attack Father     Heart disease Sister     Heart disease Brother     Cirrhosis Brother         alcoholic    Heart disease Sister     Heart disease Brother     Hypertension Brother     Diabetes Brother      Social History     Substance and Sexual Activity   Alcohol Use Not Currently    Comment: up to 1 yr ago drank 2-3 drinks on occasion but sporadic     Social History     Tobacco Use   Smoking Status Never   Smokeless Tobacco Never     Social History     Substance and Sexual Activity   Drug Use No       Current Outpatient Medications:     albuterol-ipratropium (DUO-NEB) 2.5 mg-0.5 mg/3 mL nebulizer solution, Take 3 mLs by nebulization every 6 (six) hours as needed for Wheezing or Shortness of Breath., Disp: 90 mL, Rfl: 3    allopurinoL (ZYLOPRIM) 300 MG tablet, Take 1 tablet (300 mg total) by mouth once daily., Disp: 90 tablet, Rfl: 3    apixaban (ELIQUIS) 2.5 mg Tab, Take 1 tablet (2.5 mg total) by mouth 2 (two) times daily., Disp: 60 tablet, Rfl: 12     atorvastatin (LIPITOR) 40 MG tablet, Take 1 tablet (40 mg total) by mouth every evening., Disp: 90 tablet, Rfl: 8    b complex vitamins tablet, Take 1 tablet by mouth once daily., Disp: , Rfl:     blood sugar diagnostic (ACCU-CHEK GUIDE TEST STRIPS) Strp, 1 strip by Other route 3 (three) times daily., Disp: 100 each, Rfl: 11    blood-glucose sensor (DEXCOM G7 SENSOR) Evon, change sensor every 10 days., Disp: 3 each, Rfl: 11    bumetanide (BUMEX) 2 MG tablet, Take 2 tablets (4mg total) by mouth in morning and take 1 tablet (2mg total) by mouth in the evening (no later then 5pm)., Disp: 90 tablet, Rfl: 5    cetirizine (ZYRTEC) 10 MG tablet, Take 1 tablet (10 mg total) by mouth once daily., Disp: 30 tablet, Rfl: 12    diclofenac sodium (VOLTAREN) 1 % Gel, Apply 2 g topically 3 (three) times daily as needed for pain., Disp: 300 g, Rfl: 1    DOBUTamine (DOBUTREX) 1,000 mg/250 mL (4,000 mcg/mL) infusion, Inject 409 mcg/min into the vein continuous., Disp: , Rfl:     ergocalciferol (ERGOCALCIFEROL) 50,000 unit Cap, Take 1 capsule (50,000 Units total) by mouth once a week., Disp: 4 capsule, Rfl: 11    famotidine (PEPCID) 10 MG tablet, Take 1 tablet (10 mg total) by mouth 2 (two) times daily., Disp: 60 tablet, Rfl: 12    febuxostat (ULORIC) 40 mg Tab, Take 40 mg by mouth once daily., Disp: , Rfl:     ferrous sulfate (FEOSOL) 325 mg (65 mg iron) Tab tablet, Take 1 tablet (325 mg total) by mouth 3 (three) times daily., Disp: 90 tablet, Rfl: 12    fluticasone propionate (FLONASE ALLERGY RELIEF) 50 mcg/actuation nasal spray, 2 sprays (100 mcg total) by Each Nostril route once daily., Disp: 16 g, Rfl: 12    fluticasone propionate (FLONASE) 50 mcg/actuation nasal spray, 2 sprays by Each Nostril route daily as needed for Rhinitis. , Disp: , Rfl:     HYDROcodone-acetaminophen (NORCO)  mg per tablet, Take 1 tablet by mouth every 6 (six) hours as needed for Pain., Disp: 120 tablet, Rfl: 0    JARDIANCE 25 mg tablet, Take 25 mg by  mouth., Disp: , Rfl:     lancets (ONETOUCH DELICA PLUS LANCET) 30 gauge Misc, by Misc.(Non-Drug; Combo Route) route 3 (three) times daily., Disp: 100 each, Rfl: 11    LIDOcaine (LIDODERM) 5 %, Place 1 patch onto the skin once daily. Remove & discard patch within 12 hours or as directed by MD., Disp: 30 patch, Rfl: 2    metOLazone (ZAROXOLYN) 5 MG tablet, Take 1 tablet (5 mg total) by mouth once daily., Disp: 30 tablet, Rfl: 11    metOLazone (ZAROXOLYN) 5 MG tablet, Take 1 tablet orally once a day., Disp: 90 tablet, Rfl: 4    mometasone-formoterol (DULERA) 200-5 mcg/actuation inhaler, Inhale 2 puffs into the lungs 2 (two) times daily. Controller, Disp: 13 g, Rfl: 11    nystatin (MYCOSTATIN) 100,000 unit/mL suspension, Take 5 mLs (500,000 Units total) by mouth 3 (three) times daily as needed., Disp: 473 mL, Rfl: 12    ondansetron (ZOFRAN-ODT) 8 MG TbDL, Take 1 tablet (8 mg total) by mouth every 8 (eight) hours as needed (nausea)., Disp: 30 tablet, Rfl: 4    predniSONE (DELTASONE) 5 MG tablet, Take 1 tablet (5 mg total) by mouth once daily., Disp: 30 tablet, Rfl: 3    pregabalin (LYRICA) 50 MG capsule, Take 1 capsule (50 mg total) by mouth every evening., Disp: 60 capsule, Rfl: 5    promethazine (PHENERGAN) 25 MG suppository, Place 1 suppository (25 mg total) rectally every 6 (six) hours as needed for nausea., Disp: 10 suppository, Rfl: 5    sacubitriL-valsartan (ENTRESTO) 24-26 mg per tablet, Take 1 tablet by mouth 2 (two) times daily., Disp: 60 tablet, Rfl: 12    scopolamine (TRANSDERM-SCOP) 1.3-1.5 mg (1 mg over 3 days), Place 1 patch onto the skin every 72 hours as needed for nausea., Disp: 24 patch, Rfl: 2    semaglutide (OZEMPIC) 0.25 mg or 0.5 mg (2 mg/3 mL) pen injector, Inject 0.5 mg into the skin every 7 days., Disp: 3 mL, Rfl: 11    SPIRIVA WITH HANDIHALER 18 mcg inhalation capsule, Inhale 1 capsule (18 mcg total) into the lungs once daily., Disp: 30 capsule, Rfl: 5    terconazole (TERAZOL 3) 0.8 % vaginal  cream, Place 1 applicator vaginally once daily., Disp: 20 g, Rfl: 1    VENTOLIN HFA 90 mcg/actuation inhaler, Inhale 2 puffs into the lungs every 6 (six) hours as needed for Shortness of Breath or Wheezing., Disp: 18 g, Rfl: 11    cetirizine (ZYRTEC) 5 MG tablet, Take 1 tablet (5 mg total) by mouth daily as needed for Allergies. (Patient not taking: Reported on 2/5/2024), Disp: , Rfl:     fluconazole (DIFLUCAN) 200 MG Tab, Take 1 tablet orally once (Patient not taking: Reported on 2/5/2024), Disp: 1 tablet, Rfl: 0    isosorbide-hydrALAZINE 20-37.5 mg (BIDIL) 20-37.5 mg Tab, Take by mouth., Disp: , Rfl:     mupirocin (BACTROBAN) 2 % ointment, Apply topically 3 (three) times daily. for 5 days (Patient not taking: Reported on 2/5/2024), Disp: 22 g, Rfl: 0  No current facility-administered medications for this visit.    Facility-Administered Medications Ordered in Other Visits:     0.9%  NaCl infusion, , Intravenous, Continuous, Corinna Hayes, NP, New Bag at 05/23/19 0745    vancomycin in dextrose 5 % 1 gram/250 mL IVPB 1,000 mg, 1,000 mg, Intravenous, On Call Procedure, Corinna Hayes NP, 1,000 mg at 05/23/19 0736     Objective:         Vitals:    02/05/24 1320   BP: 114/71   Pulse: (!) 56     Physical Exam  No synovitis on exam   Right knee with significant pain in the joint line. Mild suprapatellar joint effusion. No warmth or erythema     Reviewed old and all outside pertinent medical records available.    All lab results personally reviewed and interpreted by me.  Lab Results   Component Value Date    WBC 7.56 01/29/2024    HGB 10.0 (L) 01/29/2024    HCT 33.5 (L) 01/29/2024    MCV 59 (L) 01/29/2024    MCH 17.6 (L) 01/29/2024    MCHC 29.9 (L) 01/29/2024    RDW 29.8 (H) 01/29/2024     01/29/2024    MPV SEE COMMENT 01/29/2024    RBCMORPHOLOG See Comments 02/20/2016    PLTEST Appears normal 01/29/2024       Lab Results   Component Value Date     01/29/2024    K 4.2 01/29/2024      "01/29/2024    CO2 27 01/29/2024     (H) 01/29/2024    BUN 72 (H) 01/29/2024    CALCIUM 9.3 01/29/2024    PROT 6.9 01/29/2024    ALBUMIN 3.8 01/29/2024    BILITOT 1.6 (H) 01/29/2024    AST 19 01/29/2024    ALKPHOS 56 01/29/2024    ALT 21 01/29/2024       Lab Results   Component Value Date    COLORU Yellow 11/27/2023    APPEARANCEUA Hazy (A) 11/27/2023    SPECGRAV 1.025 11/27/2023    PHUR 6.0 11/27/2023    PROTEINUA 3+ (A) 11/27/2023    KETONESU Negative 11/27/2023    LEUKOCYTESUR Negative 11/27/2023    NITRITE Negative 11/27/2023    UROBILINOGEN Negative 11/27/2023       Lab Results   Component Value Date    CRP 0.5 01/04/2023       Lab Results   Component Value Date    SEDRATE 3 07/20/2016       No components found for: "25OHVITDTOT", "82HZEUVA6", "29INJVPN7", "METHODNOTE"    Lab Results   Component Value Date    URICACID 4.3 11/09/2023       Lab Results   Component Value Date    HEPBSAB 158.83 01/03/2023    HEPBSAB Reactive 01/03/2023    HEPBSAG Non-reactive 01/03/2023    HEPBSAG Negative 12/03/2021    HEPCAB Non-reactive 06/20/2023    HEPCAB Non-reactive 11/15/2022       Imaging:  All imaging reviewed and independently interpreted by me.         ASSESSMENT / PLAN:     Hafsa Hawley is a 58 y.o. Black or  female with:    1. Chronic gout of right knee, unspecified cause  -Crystal proven on arthrocentesis from 5/2023  -Recent SUA 4.3 at goal. Unable to do Febuxostat due to cardiac history. Avoid probenecid due to CKD. If worsening hyperuricemia, consider starting Pegloticase. Will need to decide carefully due to extensive medical history.  -Continue allopurinol 300 mg daily.   -Continue prednisone 5 mg daily.     2. Effusion of right knee  -Significant pain in right knee with mild swelling. She would benefit of CSI today. Patient agrees. Post injection instructions discussed with patient. See procedure note.     3. Stage 4 chronic kidney disease  -Managed by nephrology  -this is a " determinant of management of her gout.     Follow up in about 3 months (around 5/5/2024).    Method of contact with patient concerns: [facility]    Disclaimer:  This note is prepared using voice recognition software and as such is likely to have errors and has not been proof read. Please contact me for questions.     Time spent: 60 minutes in face to face discussion concerning diagnosis, prognosis, review of lab and test results, benefits of treatment as well as management of disease, counseling of patient and coordination of care between various health care providers.  Greater than half the time spent was used for coordination of care and counseling of patient.    Terese Patel M.D.  Rheumatology  [facility]

## 2024-02-05 NOTE — PROGRESS NOTES
1230  Right upper arm PICC line dressing change done using sterile technique   No signs of infection noted.  Stat lock device, bio patch and tegaderm reapplied.  Tolerated well.

## 2024-02-06 RX ORDER — METRONIDAZOLE 500 MG/1
500 TABLET ORAL EVERY 12 HOURS
Qty: 14 TABLET | Refills: 0 | Status: SHIPPED | OUTPATIENT
Start: 2024-02-06 | End: 2024-02-13

## 2024-02-07 DIAGNOSIS — E11.9 TYPE 2 DIABETES MELLITUS WITHOUT COMPLICATION: ICD-10-CM

## 2024-02-08 ENCOUNTER — TELEPHONE (OUTPATIENT)
Dept: OBSTETRICS AND GYNECOLOGY | Facility: CLINIC | Age: 59
End: 2024-02-08
Payer: MEDICAID

## 2024-02-08 NOTE — TELEPHONE ENCOUNTER
Pt called stating that she's still having discharge, I informed the pt that she just completed the medication 2-3 days ago so it will take a little longer for her to see a difference in the discharge and irritation. Pt asked if she can refill there terozol cream and I informed her that she does have a refill if she needs it, pt v/u.

## 2024-02-08 NOTE — TELEPHONE ENCOUNTER
----- Message from Mia Reinoso MA sent at 2/8/2024 11:19 AM CST -----  Contact: self  Hafsa Hawley  MRN: [id number]  Home Phone      [phone]  Work Phone      Not on file.  Mobile          [phone]    Patient Care Team:  Cristobal Ann MD as PCP - General (Family Medicine)  Sameer Chacko MD as Consulting Physician (Cardiothoracic Surgery)  Miriam Lares LPN as Care Coordinator  Sandra Hernandes MD as Consulting Physician (Palliative Medicine)  Kira Hawkins RN as Registered Nurse (Palliative Medicine)  [facility] [place] (Ophthalmology)  Julio Salter MD as Obstetrician (Obstetrics)  OB? No  What phone number can you be reached at? [phone]  Message: Would like to speak to nurse regarding still having issues with discharge.

## 2024-02-12 ENCOUNTER — INFUSION (OUTPATIENT)
Dept: INFUSION THERAPY | Facility: HOSPITAL | Age: 59
End: 2024-02-12
Attending: INTERNAL MEDICINE
Payer: MEDICAID

## 2024-02-12 DIAGNOSIS — I42.9 CARDIOMYOPATHY, UNSPECIFIED TYPE: Primary | ICD-10-CM

## 2024-02-12 LAB
ALBUMIN SERPL BCP-MCNC: 3.8 G/DL (ref 3.5–5.2)
ALP SERPL-CCNC: 58 U/L (ref 55–135)
ALT SERPL W/O P-5'-P-CCNC: 18 U/L (ref 10–44)
ANION GAP SERPL CALC-SCNC: 11 MMOL/L (ref 8–16)
ANISOCYTOSIS BLD QL SMEAR: ABNORMAL
AST SERPL-CCNC: 17 U/L (ref 10–40)
BASOPHILS # BLD AUTO: 0.04 K/UL (ref 0–0.2)
BASOPHILS NFR BLD: 0.4 % (ref 0–1.9)
BILIRUB SERPL-MCNC: 1.6 MG/DL (ref 0.1–1)
BNP SERPL-MCNC: 562 PG/ML (ref 0–99)
BUN SERPL-MCNC: 76 MG/DL (ref 6–20)
CALCIUM SERPL-MCNC: 9.5 MG/DL (ref 8.7–10.5)
CHLORIDE SERPL-SCNC: 101 MMOL/L (ref 95–110)
CO2 SERPL-SCNC: 24 MMOL/L (ref 23–29)
CREAT SERPL-MCNC: 2.8 MG/DL (ref 0.5–1.4)
DACRYOCYTES BLD QL SMEAR: ABNORMAL
DIFFERENTIAL METHOD BLD: ABNORMAL
EOSINOPHIL # BLD AUTO: 0.4 K/UL (ref 0–0.5)
EOSINOPHIL NFR BLD: 4.7 % (ref 0–8)
ERYTHROCYTE [DISTWIDTH] IN BLOOD BY AUTOMATED COUNT: 30.2 % (ref 11.5–14.5)
EST. GFR  (NO RACE VARIABLE): 19 ML/MIN/1.73 M^2
GLUCOSE SERPL-MCNC: 144 MG/DL (ref 70–110)
HCT VFR BLD AUTO: 34 % (ref 37–48.5)
HGB BLD-MCNC: 10.3 G/DL (ref 12–16)
IMM GRANULOCYTES # BLD AUTO: 0.05 K/UL (ref 0–0.04)
IMM GRANULOCYTES NFR BLD AUTO: 0.5 % (ref 0–0.5)
LYMPHOCYTES # BLD AUTO: 0.5 K/UL (ref 1–4.8)
LYMPHOCYTES NFR BLD: 5.1 % (ref 18–48)
MAGNESIUM SERPL-MCNC: 1.8 MG/DL (ref 1.6–2.6)
MCH RBC QN AUTO: 18 PG (ref 27–31)
MCHC RBC AUTO-ENTMCNC: 30.3 G/DL (ref 32–36)
MCV RBC AUTO: 59 FL (ref 82–98)
MONOCYTES # BLD AUTO: 0.4 K/UL (ref 0.3–1)
MONOCYTES NFR BLD: 4.5 % (ref 4–15)
NEUTROPHILS # BLD AUTO: 7.8 K/UL (ref 1.8–7.7)
NEUTROPHILS NFR BLD: 84.8 % (ref 38–73)
NRBC BLD-RTO: 1 /100 WBC
OVALOCYTES BLD QL SMEAR: ABNORMAL
PHOSPHATE SERPL-MCNC: 4.1 MG/DL (ref 2.7–4.5)
PLATELET # BLD AUTO: 145 K/UL (ref 150–450)
PLATELET BLD QL SMEAR: ABNORMAL
PMV BLD AUTO: ABNORMAL FL (ref 9.2–12.9)
POIKILOCYTOSIS BLD QL SMEAR: ABNORMAL
POTASSIUM SERPL-SCNC: 4.3 MMOL/L (ref 3.5–5.1)
PROT SERPL-MCNC: 7.2 G/DL (ref 6–8.4)
RBC # BLD AUTO: 5.72 M/UL (ref 4–5.4)
SCHISTOCYTES BLD QL SMEAR: ABNORMAL
SODIUM SERPL-SCNC: 136 MMOL/L (ref 136–145)
SPHEROCYTES BLD QL SMEAR: ABNORMAL
WBC # BLD AUTO: 9.2 K/UL (ref 3.9–12.7)

## 2024-02-12 PROCEDURE — 36415 COLL VENOUS BLD VENIPUNCTURE: CPT | Performed by: INTERNAL MEDICINE

## 2024-02-12 PROCEDURE — 84100 ASSAY OF PHOSPHORUS: CPT | Performed by: INTERNAL MEDICINE

## 2024-02-12 PROCEDURE — 80053 COMPREHEN METABOLIC PANEL: CPT | Performed by: INTERNAL MEDICINE

## 2024-02-12 PROCEDURE — 85025 COMPLETE CBC W/AUTO DIFF WBC: CPT | Performed by: INTERNAL MEDICINE

## 2024-02-12 PROCEDURE — 83735 ASSAY OF MAGNESIUM: CPT | Performed by: INTERNAL MEDICINE

## 2024-02-12 PROCEDURE — 83880 ASSAY OF NATRIURETIC PEPTIDE: CPT | Performed by: INTERNAL MEDICINE

## 2024-02-12 NOTE — PROGRESS NOTES
1230  Blood drawn from PICC line.  Line flushed with normal saline and pt flushed with home heparin

## 2024-02-12 NOTE — PROGRESS NOTES
235  Right upper arm PICC line dressing change done using sterile technique.  No signs of infection noted.   Biopatch, stat lock device and tegaderm dressing applied.  Dc to home in stable condition

## 2024-02-15 RX ORDER — ERGOCALCIFEROL 1.25 MG/1
50000 CAPSULE ORAL WEEKLY
Qty: 4 CAPSULE | Refills: 11 | Status: CANCELLED | OUTPATIENT
Start: 2024-02-15

## 2024-02-16 RX ORDER — PROMETHAZINE HYDROCHLORIDE AND CODEINE PHOSPHATE 6.25; 1 MG/5ML; MG/5ML
SOLUTION ORAL
Qty: 177 ML | Refills: 0 | Status: CANCELLED | OUTPATIENT
Start: 2024-02-16

## 2024-02-19 ENCOUNTER — INFUSION (OUTPATIENT)
Dept: INFUSION THERAPY | Facility: HOSPITAL | Age: 59
End: 2024-02-19
Attending: INTERNAL MEDICINE
Payer: MEDICAID

## 2024-02-19 VITALS
HEART RATE: 96 BPM | TEMPERATURE: 97 F | RESPIRATION RATE: 20 BRPM | DIASTOLIC BLOOD PRESSURE: 72 MMHG | SYSTOLIC BLOOD PRESSURE: 118 MMHG

## 2024-02-19 DIAGNOSIS — E11.9 DIABETES MELLITUS WITHOUT COMPLICATION: ICD-10-CM

## 2024-02-19 DIAGNOSIS — N18.4 CHRONIC KIDNEY DISEASE, STAGE IV (SEVERE): ICD-10-CM

## 2024-02-19 DIAGNOSIS — I11.0 HYPERTENSIVE HEART DISEASE WITH CONGESTIVE HEART FAILURE, UNSPECIFIED HEART FAILURE TYPE: ICD-10-CM

## 2024-02-19 DIAGNOSIS — I42.9 CARDIOMYOPATHY, UNSPECIFIED TYPE: Primary | ICD-10-CM

## 2024-02-19 DIAGNOSIS — I50.42 CHRONIC COMBINED SYSTOLIC AND DIASTOLIC HEART FAILURE: ICD-10-CM

## 2024-02-19 LAB
ALBUMIN SERPL BCP-MCNC: 3.7 G/DL (ref 3.5–5.2)
ALP SERPL-CCNC: 49 U/L (ref 55–135)
ALT SERPL W/O P-5'-P-CCNC: 18 U/L (ref 10–44)
ANION GAP SERPL CALC-SCNC: 11 MMOL/L (ref 8–16)
ANISOCYTOSIS BLD QL SMEAR: ABNORMAL
AST SERPL-CCNC: 21 U/L (ref 10–40)
BASOPHILS NFR BLD: 0 % (ref 0–1.9)
BILIRUB SERPL-MCNC: 1.4 MG/DL (ref 0.1–1)
BNP SERPL-MCNC: 590 PG/ML (ref 0–99)
BUN SERPL-MCNC: 74 MG/DL (ref 6–20)
CALCIUM SERPL-MCNC: 9.7 MG/DL (ref 8.7–10.5)
CHLORIDE SERPL-SCNC: 107 MMOL/L (ref 95–110)
CO2 SERPL-SCNC: 24 MMOL/L (ref 23–29)
CREAT SERPL-MCNC: 2.1 MG/DL (ref 0.5–1.4)
DACRYOCYTES BLD QL SMEAR: ABNORMAL
DIFFERENTIAL METHOD BLD: ABNORMAL
EOSINOPHIL NFR BLD: 4 % (ref 0–8)
ERYTHROCYTE [DISTWIDTH] IN BLOOD BY AUTOMATED COUNT: 30.5 % (ref 11.5–14.5)
EST. GFR  (NO RACE VARIABLE): 27 ML/MIN/1.73 M^2
GLUCOSE SERPL-MCNC: 124 MG/DL (ref 70–110)
HCT VFR BLD AUTO: 33.8 % (ref 37–48.5)
HGB BLD-MCNC: 10.2 G/DL (ref 12–16)
HYPOCHROMIA BLD QL SMEAR: ABNORMAL
IMM GRANULOCYTES # BLD AUTO: ABNORMAL K/UL (ref 0–0.04)
IMM GRANULOCYTES NFR BLD AUTO: ABNORMAL % (ref 0–0.5)
LYMPHOCYTES NFR BLD: 8 % (ref 18–48)
MAGNESIUM SERPL-MCNC: 1.7 MG/DL (ref 1.6–2.6)
MCH RBC QN AUTO: 18.2 PG (ref 27–31)
MCHC RBC AUTO-ENTMCNC: 30.2 G/DL (ref 32–36)
MCV RBC AUTO: 60 FL (ref 82–98)
MONOCYTES NFR BLD: 8 % (ref 4–15)
NEUTROPHILS NFR BLD: 80 % (ref 38–73)
NRBC BLD-RTO: 0 /100 WBC
OVALOCYTES BLD QL SMEAR: ABNORMAL
PHOSPHATE SERPL-MCNC: 3.2 MG/DL (ref 2.7–4.5)
PLATELET # BLD AUTO: 145 K/UL (ref 150–450)
PLATELET BLD QL SMEAR: ABNORMAL
PMV BLD AUTO: ABNORMAL FL (ref 9.2–12.9)
POIKILOCYTOSIS BLD QL SMEAR: ABNORMAL
POLYCHROMASIA BLD QL SMEAR: ABNORMAL
POTASSIUM SERPL-SCNC: 4.3 MMOL/L (ref 3.5–5.1)
PROT SERPL-MCNC: 7 G/DL (ref 6–8.4)
RBC # BLD AUTO: 5.6 M/UL (ref 4–5.4)
SCHISTOCYTES BLD QL SMEAR: PRESENT
SODIUM SERPL-SCNC: 142 MMOL/L (ref 136–145)
TARGETS BLD QL SMEAR: ABNORMAL
WBC # BLD AUTO: 7.94 K/UL (ref 3.9–12.7)

## 2024-02-19 PROCEDURE — 85025 COMPLETE CBC W/AUTO DIFF WBC: CPT | Performed by: INTERNAL MEDICINE

## 2024-02-19 PROCEDURE — 83735 ASSAY OF MAGNESIUM: CPT | Performed by: INTERNAL MEDICINE

## 2024-02-19 PROCEDURE — 36415 COLL VENOUS BLD VENIPUNCTURE: CPT

## 2024-02-19 PROCEDURE — 83880 ASSAY OF NATRIURETIC PEPTIDE: CPT | Performed by: INTERNAL MEDICINE

## 2024-02-19 PROCEDURE — 80053 COMPREHEN METABOLIC PANEL: CPT | Performed by: INTERNAL MEDICINE

## 2024-02-19 PROCEDURE — 36415 COLL VENOUS BLD VENIPUNCTURE: CPT | Mod: XB | Performed by: INTERNAL MEDICINE

## 2024-02-19 PROCEDURE — 84100 ASSAY OF PHOSPHORUS: CPT | Performed by: INTERNAL MEDICINE

## 2024-02-19 RX ORDER — HEPARIN 100 UNIT/ML
5 SYRINGE INTRAVENOUS ONCE
Status: DISCONTINUED | OUTPATIENT
Start: 2024-02-19 | End: 2024-05-06 | Stop reason: CLARIF

## 2024-02-19 NOTE — PROGRESS NOTES
1220  Picc line flushes easily but no blood return noted.  Flushed with Normal saline.  Pt flushed with home heparin.  Lisset stick to left AC done for blood work

## 2024-02-19 NOTE — PROGRESS NOTES
1200  Dressing to right upper arm PICC line changed using sterile technique.  No signs of infection noted.  Tolerated well.    Stat lock device, bio patch and tegaderm dressing reapplied.  Tolerated well

## 2024-02-21 DIAGNOSIS — E11.9 TYPE 2 DIABETES MELLITUS WITHOUT COMPLICATION: ICD-10-CM

## 2024-02-22 ENCOUNTER — OFFICE VISIT (OUTPATIENT)
Dept: PODIATRY | Facility: CLINIC | Age: 59
End: 2024-02-22
Payer: MEDICAID

## 2024-02-22 VITALS — HEIGHT: 66 IN | WEIGHT: 176.81 LBS | BODY MASS INDEX: 28.42 KG/M2

## 2024-02-22 DIAGNOSIS — E11.22 TYPE 2 DIABETES MELLITUS WITH STAGE 4 CHRONIC KIDNEY DISEASE, WITHOUT LONG-TERM CURRENT USE OF INSULIN: ICD-10-CM

## 2024-02-22 DIAGNOSIS — Z95.810 ICD (IMPLANTABLE CARDIOVERTER-DEFIBRILLATOR) IN PLACE: Chronic | ICD-10-CM

## 2024-02-22 DIAGNOSIS — I50.42 CHRONIC COMBINED SYSTOLIC AND DIASTOLIC HEART FAILURE: Chronic | ICD-10-CM

## 2024-02-22 DIAGNOSIS — N18.4 TYPE 2 DIABETES MELLITUS WITH STAGE 4 CHRONIC KIDNEY DISEASE, WITHOUT LONG-TERM CURRENT USE OF INSULIN: ICD-10-CM

## 2024-02-22 DIAGNOSIS — L60.0 INGROWN TOENAIL OF RIGHT FOOT: Primary | ICD-10-CM

## 2024-02-22 DIAGNOSIS — I13.0 HYPERTENSIVE CARDIOVASCULAR-RENAL DISEASE, STAGE 1-4 OR UNSPECIFIED CHRONIC KIDNEY DISEASE, WITH HEART FAILURE: ICD-10-CM

## 2024-02-22 PROCEDURE — 1159F MED LIST DOCD IN RCRD: CPT | Mod: CPTII,,, | Performed by: PODIATRIST

## 2024-02-22 PROCEDURE — 11765 WEDGE EXCISION SKN NAIL FOLD: CPT | Mod: T5,PBBFAC,PN | Performed by: PODIATRIST

## 2024-02-22 PROCEDURE — 99203 OFFICE O/P NEW LOW 30 MIN: CPT | Mod: 25,S$PBB,, | Performed by: PODIATRIST

## 2024-02-22 PROCEDURE — 99999 PR PBB SHADOW E&M-EST. PATIENT-LVL V: CPT | Mod: PBBFAC,,, | Performed by: PODIATRIST

## 2024-02-22 PROCEDURE — 3008F BODY MASS INDEX DOCD: CPT | Mod: CPTII,,, | Performed by: PODIATRIST

## 2024-02-22 PROCEDURE — 99215 OFFICE O/P EST HI 40 MIN: CPT | Mod: PBBFAC,PN,25 | Performed by: PODIATRIST

## 2024-02-22 PROCEDURE — 1160F RVW MEDS BY RX/DR IN RCRD: CPT | Mod: CPTII,,, | Performed by: PODIATRIST

## 2024-02-22 PROCEDURE — 4010F ACE/ARB THERAPY RXD/TAKEN: CPT | Mod: CPTII,,, | Performed by: PODIATRIST

## 2024-02-22 RX ORDER — CLINDAMYCIN HYDROCHLORIDE 300 MG/1
300 CAPSULE ORAL EVERY 6 HOURS
Qty: 28 CAPSULE | Refills: 0 | Status: SHIPPED | OUTPATIENT
Start: 2024-02-22 | End: 2024-03-01

## 2024-02-22 NOTE — PATIENT INSTRUCTIONS
Instructions for Care after Ingrown Nail removal    General Information: Stay off your feet as much as possible today. You may wear a surgical shoe, sandal or any open toed shoe that does not squeeze, constrict or put pressure on your toe(s). Your toe(s) may remain numb for up to 2-24 hours after the procedure. Although most patients can wear a closed loose fitting shoe after the first week, the toe will heal faster the more you use the open toed shoe in the first 2-3  weeks. Please contact our office if you have any questions or concerns.    Bleeding: Slight bleeding, discoloration and drainage are normal. Due to the chemical used there may be some yellow-clear drainage coming from the toe for 2-3 weeks.    Discomfort: You can elevate your foot to help alleviate minor swelling, bleeding and discomfort. You may also take Advil, Tylenol or other over the counter pain medications to help alleviate pain. Call our office if the pain is not well controlled. Most patients have very little discomfort as long as they minimize their walking for the first 24 hours and do not bump the toe.    Removing the Bandage: Starting the day after the procedure, carefully remove the dressing and shower or bathe as normal. It is Ok to get the bandage soaking wet in the shower and when you remove it, it should not stick to the surgery site.    Dressing Options- Traditional Method:  1. Soaking two times a day in WARM water with Epsom salts or diluted Povidone Iodine  (Betadine) for 15-20 minutes. You will need to purchase these products from the pharmacy.  2. Dry toe then apply an antibiotic cream or ointment such as Neosporin or Polysporin plus or  Garamycin and cover with a 2 x 2 inch size gauze and then secure with a 1 inch band aid.  3. In the second week, take the dressing off at bedtime to air dry the toe.  4. If the toe is infected take the Antibiotic Pills as directed until finished.      Ingrown Toenail        What Is an Ingrown  Toenail?    When a toenail is ingrown, it is curved and grows into the skin, usually at the nail borders (the sides of the nail). This digging in of the nail irritates the skin, often creating pain, redness, swelling and warmth in the toe.    If an ingrown nail causes a break in the skin, bacteria may enter and cause an infection in the area, which is often marked by drainage and a foul odor. However, even if the toe is not painful, red, swollen or warm, a nail that curves downward into the skin can progress to an infection.    Ingrown toenail and normal toenail    Causes  Causes of ingrown toenails include:    Heredity. In many people, the tendency for ingrown toenails is inherited.  Trauma. Sometimes an ingrown toenail is the result of trauma, such as stubbing your toe, having an object fall on your toe or engaging in activities that involve repeated pressure on the toes, such as kicking or running.  Improper trimming. The most common cause of ingrown toenails is cutting your nails too short. This encourages the skin next to the nail to fold over the nail.   Improperly sized footwear. Ingrown toenails can result from wearing socks and shoes that are tight or short.  Nail conditions. Ingrown toenails can be caused by nail problems, such as fungal infections or losing a nail due to trauma.     Treatment  Sometimes initial treatment for ingrown toenails can be safely performed at home. However, home treatment is strongly discouraged if an infection is suspected or for those who have medical conditions that put feet at high risk, such as diabetes, nerve damage in the foot or poor circulation.        Ingrown toenail before and after treatment    Home Care  If you do not have an infection or any of the above medical conditions, you can soak your foot in room-temperature water (adding Epsom salt may be recommended by your doctor) and gently massage the side of the nail fold to help reduce the inflammation.    Avoid  attempting bathroom surgery. Repeated cutting of the nail can cause the condition to worsen over time. If your symptoms fail to improve, it is time to see a foot and ankle surgeon.    Physician Care  After examining the toe, the foot and ankle surgeon will select the treatment best suited for you. If an infection is present, an oral antibiotic may be prescribed.    Sometimes a minor surgical procedure, often performed in the office, will ease the pain and remove the offending nail. After applying a local anesthetic, the doctor removes part of the nails side border. Some nails may become ingrown again, requiring removal of the nail root.    Following the nail procedure, a light bandage will be applied. Most people experience very little pain after surgery and may resume normal activity the next day. If your surgeon has prescribed an oral antibiotic, be sure to take all the medication, even if your symptoms have improved.    Preventing Ingrown Toenails  Many cases of ingrown toenails may be prevented by:    Proper trimming. Cut toenails in a fairly straight line, and do not cut them too short. You should be able to get your fingernail under the sides and end of the nail.  Well-fitting shoes and socks. Do not wear shoes that are short or tight in the toe area. Avoid shoes that are loose because they too cause pressure on the toes, especially when running or walking briskly.               What You Should Know About Home Treatment  Do not cut a notch in the nail. Contrary to what some people believe, this does not reduce the tendency for the nail to curve downward.  Do not repeatedly trim nail borders. Repeated trimming does not change the way the nail grows and can make the condition worse.  Do not place cotton under the nail. Not only does this not relieve the pain, it provides a place for harmful bacteria to grow, resulting in infection.  Over-the-counter medications are ineffective. Topical medications may mask  the pain, but they do not correct the underlying problem.        Understanding Ingrown Toenails    An ingrown nail is the result of a nail growing into the skin that surrounds it. This often occurs at either edge of the big toe. Ingrown nails may be caused by improper trimming, inherited nail deformities, injuries, fungal infections, or pressure.  Symptoms  Ingrown nails may cause pain at the tip of the toe or all the way to the base of the toe. The pain is often worse while walking. An ingrown nail may also lead to infection, inflammation, or a more serious condition. If its infected, you might see pus or redness.  Evaluation  To determine the extent of your problem, your healthcare provider examines and possibly presses the painful area. If other problems are suspected, blood tests, cultures, and X-rays may be done as well.  Treatment  If the nail isnt infected, your healthcare provider may trim the corner of it to help relieve your symptoms. He or she may need to remove one side of your nail back to the cuticle. The base of the nail may then be treated with a chemical to keep the ingrown part from growing back. Severe infections or ingrown nails may require antibiotics and temporary or permanent removal of a portion of the nail. To prevent pain, a local anesthetic may be used in these procedures. This treatment is usually done at your healthcare provider's office.  Prevention  Many nail problems can be prevented by wearing the right shoes and trimming your nails properly. To help avoid infection, keep your feet clean and dry. If you have diabetes, talk with your healthcare provider before doing any foot self-care.  The right shoes: Get your feet measured (your size may change as you age). Wear shoes that are supportive and roomy enough for your toes to wiggle. Look for shoes made of natural materials such as leather, which allow your feet to breathe.  Proper trimming: To avoid problems, trim your toenails  straight across without cutting down into the corners. If you cant trim your own nails, ask your healthcare provider to do so for you.  Date Last Reviewed: 10/1/2016  © 7913-4061 [employer]. [address] [place] [address], [place] [address]. All rights reserved. This information is not intended as a substitute for professional medical care. Always follow your healthcare professional's instructions.

## 2024-02-26 ENCOUNTER — INFUSION (OUTPATIENT)
Dept: INFUSION THERAPY | Facility: HOSPITAL | Age: 59
End: 2024-02-26
Attending: INTERNAL MEDICINE
Payer: MEDICAID

## 2024-02-26 ENCOUNTER — OFFICE VISIT (OUTPATIENT)
Dept: OBSTETRICS AND GYNECOLOGY | Facility: CLINIC | Age: 59
End: 2024-02-26
Payer: MEDICAID

## 2024-02-26 VITALS
SYSTOLIC BLOOD PRESSURE: 142 MMHG | DIASTOLIC BLOOD PRESSURE: 69 MMHG | HEART RATE: 105 BPM | BODY MASS INDEX: 28.9 KG/M2 | DIASTOLIC BLOOD PRESSURE: 84 MMHG | SYSTOLIC BLOOD PRESSURE: 116 MMHG | TEMPERATURE: 97 F | HEIGHT: 66 IN | RESPIRATION RATE: 24 BRPM | HEART RATE: 90 BPM | WEIGHT: 179.81 LBS

## 2024-02-26 DIAGNOSIS — I42.9 CARDIOMYOPATHY, UNSPECIFIED TYPE: Primary | ICD-10-CM

## 2024-02-26 DIAGNOSIS — R39.15 URGENCY OF URINATION: ICD-10-CM

## 2024-02-26 DIAGNOSIS — Z11.3 SCREENING EXAMINATION FOR STD (SEXUALLY TRANSMITTED DISEASE): Primary | ICD-10-CM

## 2024-02-26 LAB
ALBUMIN SERPL BCP-MCNC: 3.8 G/DL (ref 3.5–5.2)
ALP SERPL-CCNC: 61 U/L (ref 55–135)
ALT SERPL W/O P-5'-P-CCNC: 18 U/L (ref 10–44)
ANION GAP SERPL CALC-SCNC: 13 MMOL/L (ref 8–16)
ANISOCYTOSIS BLD QL SMEAR: ABNORMAL
AST SERPL-CCNC: 20 U/L (ref 10–40)
BASOPHILS # BLD AUTO: 0.04 K/UL (ref 0–0.2)
BASOPHILS NFR BLD: 0.6 % (ref 0–1.9)
BILIRUB SERPL-MCNC: 1.5 MG/DL (ref 0.1–1)
BNP SERPL-MCNC: 534 PG/ML (ref 0–99)
BUN SERPL-MCNC: 58 MG/DL (ref 6–20)
BURR CELLS BLD QL SMEAR: ABNORMAL
CALCIUM SERPL-MCNC: 9.9 MG/DL (ref 8.7–10.5)
CHLORIDE SERPL-SCNC: 106 MMOL/L (ref 95–110)
CO2 SERPL-SCNC: 22 MMOL/L (ref 23–29)
CREAT SERPL-MCNC: 2.1 MG/DL (ref 0.5–1.4)
DACRYOCYTES BLD QL SMEAR: ABNORMAL
DIFFERENTIAL METHOD BLD: ABNORMAL
EOSINOPHIL # BLD AUTO: 0.4 K/UL (ref 0–0.5)
EOSINOPHIL NFR BLD: 5.3 % (ref 0–8)
ERYTHROCYTE [DISTWIDTH] IN BLOOD BY AUTOMATED COUNT: 30 % (ref 11.5–14.5)
EST. GFR  (NO RACE VARIABLE): 27 ML/MIN/1.73 M^2
GLUCOSE SERPL-MCNC: 144 MG/DL (ref 70–110)
HCT VFR BLD AUTO: 34 % (ref 37–48.5)
HGB BLD-MCNC: 10.7 G/DL (ref 12–16)
IMM GRANULOCYTES # BLD AUTO: 0.05 K/UL (ref 0–0.04)
IMM GRANULOCYTES NFR BLD AUTO: 0.7 % (ref 0–0.5)
LYMPHOCYTES # BLD AUTO: 0.6 K/UL (ref 1–4.8)
LYMPHOCYTES NFR BLD: 7.6 % (ref 18–48)
MAGNESIUM SERPL-MCNC: 1.8 MG/DL (ref 1.6–2.6)
MCH RBC QN AUTO: 18.8 PG (ref 27–31)
MCHC RBC AUTO-ENTMCNC: 31.5 G/DL (ref 32–36)
MCV RBC AUTO: 60 FL (ref 82–98)
MONOCYTES # BLD AUTO: 0.4 K/UL (ref 0.3–1)
MONOCYTES NFR BLD: 6.1 % (ref 4–15)
NEUTROPHILS # BLD AUTO: 5.8 K/UL (ref 1.8–7.7)
NEUTROPHILS NFR BLD: 79.7 % (ref 38–73)
NRBC BLD-RTO: 0 /100 WBC
OVALOCYTES BLD QL SMEAR: ABNORMAL
PHOSPHATE SERPL-MCNC: 3.8 MG/DL (ref 2.7–4.5)
PLATELET # BLD AUTO: 157 K/UL (ref 150–450)
PLATELET BLD QL SMEAR: ABNORMAL
PMV BLD AUTO: ABNORMAL FL (ref 9.2–12.9)
POIKILOCYTOSIS BLD QL SMEAR: ABNORMAL
POTASSIUM SERPL-SCNC: 4.5 MMOL/L (ref 3.5–5.1)
PROT SERPL-MCNC: 7.3 G/DL (ref 6–8.4)
RBC # BLD AUTO: 5.7 M/UL (ref 4–5.4)
SCHISTOCYTES BLD QL SMEAR: ABNORMAL
SCHISTOCYTES BLD QL SMEAR: PRESENT
SODIUM SERPL-SCNC: 141 MMOL/L (ref 136–145)
SPHEROCYTES BLD QL SMEAR: ABNORMAL
TARGETS BLD QL SMEAR: ABNORMAL
WBC # BLD AUTO: 7.23 K/UL (ref 3.9–12.7)

## 2024-02-26 PROCEDURE — 1160F RVW MEDS BY RX/DR IN RCRD: CPT | Mod: CPTII,,, | Performed by: OBSTETRICS & GYNECOLOGY

## 2024-02-26 PROCEDURE — 83880 ASSAY OF NATRIURETIC PEPTIDE: CPT | Performed by: INTERNAL MEDICINE

## 2024-02-26 PROCEDURE — 87086 URINE CULTURE/COLONY COUNT: CPT | Performed by: OBSTETRICS & GYNECOLOGY

## 2024-02-26 PROCEDURE — 99999 PR PBB SHADOW E&M-EST. PATIENT-LVL IV: CPT | Mod: PBBFAC,,, | Performed by: OBSTETRICS & GYNECOLOGY

## 2024-02-26 PROCEDURE — 84100 ASSAY OF PHOSPHORUS: CPT | Performed by: INTERNAL MEDICINE

## 2024-02-26 PROCEDURE — 36415 COLL VENOUS BLD VENIPUNCTURE: CPT | Mod: XB | Performed by: INTERNAL MEDICINE

## 2024-02-26 PROCEDURE — 3079F DIAST BP 80-89 MM HG: CPT | Mod: CPTII,,, | Performed by: OBSTETRICS & GYNECOLOGY

## 2024-02-26 PROCEDURE — 83735 ASSAY OF MAGNESIUM: CPT | Performed by: INTERNAL MEDICINE

## 2024-02-26 PROCEDURE — 99214 OFFICE O/P EST MOD 30 MIN: CPT | Mod: PBBFAC | Performed by: OBSTETRICS & GYNECOLOGY

## 2024-02-26 PROCEDURE — 3008F BODY MASS INDEX DOCD: CPT | Mod: CPTII,,, | Performed by: OBSTETRICS & GYNECOLOGY

## 2024-02-26 PROCEDURE — 80053 COMPREHEN METABOLIC PANEL: CPT | Performed by: INTERNAL MEDICINE

## 2024-02-26 PROCEDURE — 36415 COLL VENOUS BLD VENIPUNCTURE: CPT

## 2024-02-26 PROCEDURE — 3077F SYST BP >= 140 MM HG: CPT | Mod: CPTII,,, | Performed by: OBSTETRICS & GYNECOLOGY

## 2024-02-26 PROCEDURE — 4010F ACE/ARB THERAPY RXD/TAKEN: CPT | Mod: CPTII,,, | Performed by: OBSTETRICS & GYNECOLOGY

## 2024-02-26 PROCEDURE — 85025 COMPLETE CBC W/AUTO DIFF WBC: CPT | Performed by: INTERNAL MEDICINE

## 2024-02-26 PROCEDURE — 99213 OFFICE O/P EST LOW 20 MIN: CPT | Mod: S$PBB,,, | Performed by: OBSTETRICS & GYNECOLOGY

## 2024-02-26 PROCEDURE — 81514 NFCT DS BV&VAGINITIS DNA ALG: CPT | Performed by: OBSTETRICS & GYNECOLOGY

## 2024-02-26 PROCEDURE — 1159F MED LIST DOCD IN RCRD: CPT | Mod: CPTII,,, | Performed by: OBSTETRICS & GYNECOLOGY

## 2024-02-26 NOTE — PROGRESS NOTES
Subjective:       Patient ID: Hafsa Hawley is a 58 y.o. female.    Chief Complaint:  Follow-up (Pt here for CARRIE (trich))      History of Present Illness   Patient presents for a test of cure from Trichomonas.  Patient was diagnosed and states she took treatment.  Patient very recently has been having frequency and urgency of urination.    Menstrual History:  OB History          2    Para   2    Term   2            AB        Living             SAB        IAB        Ectopic        Multiple        Live Births                    Menarche age:   Patient's last menstrual period was 10/23/2001.         Review of Systems  Review of Systems   Constitutional:  Positive for appetite change, chills, diaphoresis, fatigue and unexpected weight change. Negative for activity change and fever.   HENT:  Positive for congestion. Negative for dental problem, drooling, ear discharge, ear pain, facial swelling, hearing loss, mouth sores, nosebleeds, postnasal drip, rhinorrhea, sinus pressure, sneezing, sore throat, tinnitus, trouble swallowing and voice change.    Eyes:  Positive for photophobia, pain, discharge, redness, itching and visual disturbance.   Respiratory:  Negative for apnea, cough, choking, chest tightness, shortness of breath, wheezing and stridor.    Cardiovascular:  Negative for chest pain, palpitations and leg swelling.   Gastrointestinal:  Positive for abdominal pain, nausea and vomiting. Negative for abdominal distention, anal bleeding, blood in stool, constipation, diarrhea and rectal pain.   Endocrine: Positive for heat intolerance, polydipsia and polyuria. Negative for cold intolerance and polyphagia.   Genitourinary:  Positive for frequency and urgency. Negative for decreased urine volume, difficulty urinating, dyspareunia, dysuria, enuresis, flank pain, genital sores, hematuria, menstrual problem, pelvic pain, vaginal bleeding, vaginal discharge and vaginal pain.   Musculoskeletal:  Positive  for back pain, myalgias and neck pain. Negative for arthralgias, gait problem, joint swelling and neck stiffness.   Skin:  Negative for color change, pallor, rash and wound.   Allergic/Immunologic: Negative for environmental allergies, food allergies and immunocompromised state.   Neurological:  Positive for dizziness, tremors, light-headedness and headaches. Negative for seizures, syncope, facial asymmetry, speech difficulty, weakness and numbness.   Hematological:  Negative for adenopathy. Does not bruise/bleed easily.   Psychiatric/Behavioral:  Positive for sleep disturbance. Negative for agitation, behavioral problems, confusion, decreased concentration, dysphoric mood, hallucinations, self-injury and suicidal ideas. The patient is nervous/anxious. The patient is not hyperactive.          Objective:      Physical Exam  Vitals and nursing note reviewed. Exam conducted with a chaperone present.   Abdominal:      Hernia: There is no hernia in the left inguinal area or right inguinal area.   Genitourinary:     General: Normal vulva.      Labia:         Right: No rash, tenderness, lesion or injury.         Left: No rash, tenderness, lesion or injury.       Urethra: No prolapse, urethral pain, urethral swelling or urethral lesion.      Vagina: No signs of injury and foreign body. No vaginal discharge, erythema, tenderness or bleeding.      Cervix: No cervical motion tenderness, discharge or friability.      Uterus: Not deviated, not enlarged, not fixed and not tender.       Adnexa:         Right: No mass, tenderness or fullness.          Left: No mass, tenderness or fullness.        Rectum: No external hemorrhoid.   Lymphadenopathy:      Lower Body: No right inguinal adenopathy. No left inguinal adenopathy.         Assessment:        1. Screening examination for STD (sexually transmitted disease)                Plan:         Hafsa was seen today for follow-up.    Diagnoses and all orders for this visit:    Screening  examination for STD (sexually transmitted disease)  -     VAGINOSIS SCREEN BY DNA PROBE

## 2024-02-26 NOTE — PROGRESS NOTES
1345   R upper arm PICC line dressing change done using sterile technique.  No signs of infection noted.  Bio patch, stat lock device and tegaderm dressing reapplied.  Tolerated well.  Pt states fell at home on rug and fractured a rib.  Remains with pain.  Unable to draw blood from PICC line  only small return noted.  Flushed with saline.  Pt to flush with home heparin.

## 2024-02-27 DIAGNOSIS — Z79.4 TYPE 2 DIABETES MELLITUS WITH STAGE 4 CHRONIC KIDNEY DISEASE, WITH LONG-TERM CURRENT USE OF INSULIN: Primary | ICD-10-CM

## 2024-02-27 DIAGNOSIS — E11.22 TYPE 2 DIABETES MELLITUS WITH STAGE 4 CHRONIC KIDNEY DISEASE, WITH LONG-TERM CURRENT USE OF INSULIN: Primary | ICD-10-CM

## 2024-02-27 DIAGNOSIS — N18.4 TYPE 2 DIABETES MELLITUS WITH STAGE 4 CHRONIC KIDNEY DISEASE, WITH LONG-TERM CURRENT USE OF INSULIN: Primary | ICD-10-CM

## 2024-02-27 RX ORDER — SEMAGLUTIDE 1.34 MG/ML
1 INJECTION, SOLUTION SUBCUTANEOUS
Qty: 3 ML | Refills: 11 | Status: SHIPPED | OUTPATIENT
Start: 2024-02-27 | End: 2025-02-26

## 2024-02-28 DIAGNOSIS — R52 PAIN: ICD-10-CM

## 2024-02-28 LAB
BACTERIA UR CULT: NORMAL
BACTERIA UR CULT: NORMAL
BACTERIAL VAGINOSIS DNA: NEGATIVE
CANDIDA GLABRATA DNA: NEGATIVE
CANDIDA KRUSEI DNA: NEGATIVE
CANDIDA RRNA VAG QL PROBE: NEGATIVE
T VAGINALIS RRNA GENITAL QL PROBE: POSITIVE

## 2024-02-28 RX ORDER — HYDROCODONE BITARTRATE AND ACETAMINOPHEN 10; 325 MG/1; MG/1
1 TABLET ORAL EVERY 6 HOURS PRN
Qty: 120 TABLET | Refills: 0 | Status: SHIPPED | OUTPATIENT
Start: 2024-02-28 | End: 2024-04-02 | Stop reason: SDUPTHER

## 2024-02-29 RX ORDER — METRONIDAZOLE 500 MG/1
500 TABLET ORAL EVERY 12 HOURS
Qty: 14 TABLET | Refills: 0 | Status: SHIPPED | OUTPATIENT
Start: 2024-02-29 | End: 2024-03-12

## 2024-03-06 ENCOUNTER — TELEPHONE (OUTPATIENT)
Dept: FAMILY MEDICINE | Facility: CLINIC | Age: 59
End: 2024-03-06
Payer: MEDICAID

## 2024-03-06 ENCOUNTER — OFFICE VISIT (OUTPATIENT)
Dept: FAMILY MEDICINE | Facility: CLINIC | Age: 59
End: 2024-03-06
Payer: MEDICAID

## 2024-03-06 ENCOUNTER — INFUSION (OUTPATIENT)
Dept: INFUSION THERAPY | Facility: HOSPITAL | Age: 59
End: 2024-03-06
Attending: INTERNAL MEDICINE
Payer: MEDICAID

## 2024-03-06 VITALS
HEART RATE: 108 BPM | SYSTOLIC BLOOD PRESSURE: 136 MMHG | DIASTOLIC BLOOD PRESSURE: 55 MMHG | RESPIRATION RATE: 22 BRPM | TEMPERATURE: 97 F

## 2024-03-06 VITALS
HEIGHT: 66 IN | BODY MASS INDEX: 28.56 KG/M2 | RESPIRATION RATE: 16 BRPM | WEIGHT: 177.69 LBS | OXYGEN SATURATION: 98 % | SYSTOLIC BLOOD PRESSURE: 124 MMHG | DIASTOLIC BLOOD PRESSURE: 72 MMHG | HEART RATE: 106 BPM

## 2024-03-06 DIAGNOSIS — Z12.31 BREAST CANCER SCREENING BY MAMMOGRAM: ICD-10-CM

## 2024-03-06 DIAGNOSIS — G62.9 NEUROPATHY: ICD-10-CM

## 2024-03-06 DIAGNOSIS — E11.9 TYPE 2 DIABETES MELLITUS WITHOUT COMPLICATION, WITHOUT LONG-TERM CURRENT USE OF INSULIN: ICD-10-CM

## 2024-03-06 DIAGNOSIS — G47.62 NOCTURNAL LEG CRAMPS: Primary | ICD-10-CM

## 2024-03-06 DIAGNOSIS — I42.9 CARDIOMYOPATHY, UNSPECIFIED TYPE: Primary | ICD-10-CM

## 2024-03-06 PROBLEM — I27.21 PULMONARY ARTERIAL HYPERTENSION: Status: ACTIVE | Noted: 2024-03-06

## 2024-03-06 PROBLEM — G57.90 LOWER EXTREMITY NEUROPATHY: Status: ACTIVE | Noted: 2019-08-14

## 2024-03-06 PROBLEM — R29.898 BILATERAL LEG WEAKNESS: Status: ACTIVE | Noted: 2022-06-29

## 2024-03-06 PROBLEM — Z86.2 H/O IRON DEFICIENCY ANEMIA: Status: ACTIVE | Noted: 2019-06-05

## 2024-03-06 PROBLEM — E78.2 HYPERLIPIDEMIA, MIXED: Status: ACTIVE | Noted: 2024-02-01

## 2024-03-06 LAB
ALBUMIN SERPL BCP-MCNC: 4 G/DL (ref 3.5–5.2)
ALP SERPL-CCNC: 59 U/L (ref 55–135)
ALT SERPL W/O P-5'-P-CCNC: 14 U/L (ref 10–44)
ANION GAP SERPL CALC-SCNC: 10 MMOL/L (ref 8–16)
ANISOCYTOSIS BLD QL SMEAR: ABNORMAL
AST SERPL-CCNC: 18 U/L (ref 10–40)
BASOPHILS # BLD AUTO: 0.04 K/UL (ref 0–0.2)
BASOPHILS NFR BLD: 0.7 % (ref 0–1.9)
BILIRUB SERPL-MCNC: 1.9 MG/DL (ref 0.1–1)
BNP SERPL-MCNC: 730 PG/ML (ref 0–99)
BUN SERPL-MCNC: 68 MG/DL (ref 6–20)
BURR CELLS BLD QL SMEAR: ABNORMAL
CALCIUM SERPL-MCNC: 9.8 MG/DL (ref 8.7–10.5)
CHLORIDE SERPL-SCNC: 100 MMOL/L (ref 95–110)
CO2 SERPL-SCNC: 26 MMOL/L (ref 23–29)
CREAT SERPL-MCNC: 2.9 MG/DL (ref 0.5–1.4)
DACRYOCYTES BLD QL SMEAR: ABNORMAL
DIFFERENTIAL METHOD BLD: ABNORMAL
EOSINOPHIL # BLD AUTO: 0.1 K/UL (ref 0–0.5)
EOSINOPHIL NFR BLD: 1 % (ref 0–8)
ERYTHROCYTE [DISTWIDTH] IN BLOOD BY AUTOMATED COUNT: 29.5 % (ref 11.5–14.5)
EST. GFR  (NO RACE VARIABLE): 18 ML/MIN/1.73 M^2
GLUCOSE SERPL-MCNC: 211 MG/DL (ref 70–110)
HCT VFR BLD AUTO: 36.7 % (ref 37–48.5)
HGB BLD-MCNC: 11.1 G/DL (ref 12–16)
HYPOCHROMIA BLD QL SMEAR: ABNORMAL
IMM GRANULOCYTES # BLD AUTO: 0.04 K/UL (ref 0–0.04)
IMM GRANULOCYTES NFR BLD AUTO: 0.7 % (ref 0–0.5)
LYMPHOCYTES # BLD AUTO: 0.8 K/UL (ref 1–4.8)
LYMPHOCYTES NFR BLD: 14.1 % (ref 18–48)
MAGNESIUM SERPL-MCNC: 1.8 MG/DL (ref 1.6–2.6)
MCH RBC QN AUTO: 18.3 PG (ref 27–31)
MCHC RBC AUTO-ENTMCNC: 30.2 G/DL (ref 32–36)
MCV RBC AUTO: 61 FL (ref 82–98)
MONOCYTES # BLD AUTO: 0.4 K/UL (ref 0.3–1)
MONOCYTES NFR BLD: 6.8 % (ref 4–15)
NEUTROPHILS # BLD AUTO: 4.4 K/UL (ref 1.8–7.7)
NEUTROPHILS NFR BLD: 76.7 % (ref 38–73)
NRBC BLD-RTO: 0 /100 WBC
OVALOCYTES BLD QL SMEAR: ABNORMAL
PHOSPHATE SERPL-MCNC: 3.8 MG/DL (ref 2.7–4.5)
PLATELET # BLD AUTO: 152 K/UL (ref 150–450)
PLATELET BLD QL SMEAR: ABNORMAL
PMV BLD AUTO: ABNORMAL FL (ref 9.2–12.9)
POIKILOCYTOSIS BLD QL SMEAR: ABNORMAL
POLYCHROMASIA BLD QL SMEAR: ABNORMAL
POTASSIUM SERPL-SCNC: 4.2 MMOL/L (ref 3.5–5.1)
PROT SERPL-MCNC: 7.2 G/DL (ref 6–8.4)
RBC # BLD AUTO: 6.05 M/UL (ref 4–5.4)
SCHISTOCYTES BLD QL SMEAR: ABNORMAL
SCHISTOCYTES BLD QL SMEAR: PRESENT
SODIUM SERPL-SCNC: 136 MMOL/L (ref 136–145)
SPHEROCYTES BLD QL SMEAR: ABNORMAL
TARGETS BLD QL SMEAR: ABNORMAL
WBC # BLD AUTO: 5.76 K/UL (ref 3.9–12.7)

## 2024-03-06 PROCEDURE — 99999 PR PBB SHADOW E&M-EST. PATIENT-LVL V: CPT | Mod: PBBFAC,,, | Performed by: STUDENT IN AN ORGANIZED HEALTH CARE EDUCATION/TRAINING PROGRAM

## 2024-03-06 PROCEDURE — 99215 OFFICE O/P EST HI 40 MIN: CPT | Mod: PBBFAC,25 | Performed by: STUDENT IN AN ORGANIZED HEALTH CARE EDUCATION/TRAINING PROGRAM

## 2024-03-06 PROCEDURE — 36592 COLLECT BLOOD FROM PICC: CPT

## 2024-03-06 PROCEDURE — 3078F DIAST BP <80 MM HG: CPT | Mod: CPTII,,, | Performed by: STUDENT IN AN ORGANIZED HEALTH CARE EDUCATION/TRAINING PROGRAM

## 2024-03-06 PROCEDURE — 3008F BODY MASS INDEX DOCD: CPT | Mod: CPTII,,, | Performed by: STUDENT IN AN ORGANIZED HEALTH CARE EDUCATION/TRAINING PROGRAM

## 2024-03-06 PROCEDURE — 85025 COMPLETE CBC W/AUTO DIFF WBC: CPT | Performed by: INTERNAL MEDICINE

## 2024-03-06 PROCEDURE — 1159F MED LIST DOCD IN RCRD: CPT | Mod: CPTII,,, | Performed by: STUDENT IN AN ORGANIZED HEALTH CARE EDUCATION/TRAINING PROGRAM

## 2024-03-06 PROCEDURE — 3074F SYST BP LT 130 MM HG: CPT | Mod: CPTII,,, | Performed by: STUDENT IN AN ORGANIZED HEALTH CARE EDUCATION/TRAINING PROGRAM

## 2024-03-06 PROCEDURE — 99214 OFFICE O/P EST MOD 30 MIN: CPT | Mod: S$PBB,,, | Performed by: STUDENT IN AN ORGANIZED HEALTH CARE EDUCATION/TRAINING PROGRAM

## 2024-03-06 PROCEDURE — 4010F ACE/ARB THERAPY RXD/TAKEN: CPT | Mod: CPTII,,, | Performed by: STUDENT IN AN ORGANIZED HEALTH CARE EDUCATION/TRAINING PROGRAM

## 2024-03-06 PROCEDURE — 80053 COMPREHEN METABOLIC PANEL: CPT | Performed by: INTERNAL MEDICINE

## 2024-03-06 PROCEDURE — 83880 ASSAY OF NATRIURETIC PEPTIDE: CPT | Performed by: INTERNAL MEDICINE

## 2024-03-06 PROCEDURE — 84100 ASSAY OF PHOSPHORUS: CPT | Performed by: INTERNAL MEDICINE

## 2024-03-06 PROCEDURE — 83735 ASSAY OF MAGNESIUM: CPT | Performed by: INTERNAL MEDICINE

## 2024-03-06 RX ORDER — PREGABALIN 100 MG/1
100 CAPSULE ORAL NIGHTLY
Qty: 30 CAPSULE | Refills: 2 | Status: SHIPPED | OUTPATIENT
Start: 2024-03-06 | End: 2024-06-30

## 2024-03-06 RX ORDER — PREGABALIN 100 MG/1
100 CAPSULE ORAL NIGHTLY
Qty: 30 CAPSULE | Refills: 2
Start: 2024-03-06 | End: 2024-03-06

## 2024-03-06 RX ORDER — LANOLIN ALCOHOL/MO/W.PET/CERES
400 CREAM (GRAM) TOPICAL NIGHTLY
Qty: 30 TABLET | Refills: 5 | Status: SHIPPED | OUTPATIENT
Start: 2024-03-06

## 2024-03-06 NOTE — PROGRESS NOTES
Subjective:       Patient ID: Hafsa Hawley is a 58 y.o. female.    Chief Complaint: Leg Pain (Patient has left leg pain X 1 month. Patient states her leg cramps up and causes her toes to curl up and lock in place.)    Pt here with intermittent bilateral leg pain and cramping for the last month. Pt states it seems worse at night. States at times her whole legs stiffens up.     DM2: she did have a hypoglycemia episode overnight. Current meds include ozempic 1mg weekly and jardiance. A1c overdue.    Review of Systems   Constitutional:  Positive for activity change. Negative for chills and fever.   HENT:  Negative for congestion, rhinorrhea and sore throat.    Respiratory:  Positive for shortness of breath. Negative for cough.    Cardiovascular:  Negative for chest pain and palpitations.   Gastrointestinal:  Negative for abdominal pain, diarrhea, nausea and vomiting.   Genitourinary:  Negative for dysuria and hematuria.   Musculoskeletal:  Positive for arthralgias.   Skin:  Negative for rash.       Objective:      Physical Exam  Vitals reviewed.   HENT:      Head: Normocephalic and atraumatic.   Eyes:      Conjunctiva/sclera: Conjunctivae normal.   Cardiovascular:      Rate and Rhythm: Normal rate and regular rhythm.      Heart sounds: Normal heart sounds. No murmur heard.  Pulmonary:      Effort: Pulmonary effort is normal. No respiratory distress.      Breath sounds: Normal breath sounds.   Musculoskeletal:         General: No deformity.      Right lower leg: No edema.      Left lower leg: No edema.   Neurological:      Mental Status: She is alert and oriented to person, place, and time.   Psychiatric:         Mood and Affect: Mood normal.         Behavior: Behavior normal.         Assessment:       1. Nocturnal leg cramps    2. Neuropathy    3. Type 2 diabetes mellitus without complication, without long-term current use of insulin    4. Breast cancer screening by mammogram        Plan:           1. Nocturnal leg  cramps  -     magnesium oxide (MAG-OX) 400 mg (241.3 mg magnesium) tablet; Take 1 tablet (400 mg total) by mouth every evening.  Dispense: 30 tablet; Refill: 5    2. Neuropathy  -     Discontinue: pregabalin (LYRICA) 100 MG capsule; Take 1 capsule (100 mg total) by mouth every evening.  Dispense: 30 capsule; Refill: 2  -     pregabalin (LYRICA) 100 MG capsule; Take 1 capsule (100 mg total) by mouth every evening.  Dispense: 30 capsule; Refill: 2    3. Type 2 diabetes mellitus without complication, without long-term current use of insulin  -     Hemoglobin A1C; Future; Expected date: 03/06/2024  -     Microalbumin/Creatinine Ratio, Urine; Future; Expected date: 03/06/2024    4. Breast cancer screening by mammogram  -     Mammo Digital Screening Bilat w/ Levi; Future; Expected date: 03/06/2024    Start magnesium  Increase lyrica to 100mg nightly  A1c due and urine micro, orders placed. Pending labs may adjust ozempic dose  Mammogram due, order placed  RTC for worsening symptoms or failure to improve, or RTC PRN/as scheduled

## 2024-03-06 NOTE — PROGRESS NOTES
1145  right upper arm PICC line dressing change done using sterile technique.  No signs of infection noted.  Bio patch, stat lock device and tegaderm reapplied   Tolerated well

## 2024-03-06 NOTE — TELEPHONE ENCOUNTER
----- Message from Frank Cook sent at 3/6/2024  9:33 AM CST -----  Contact: Patient  Hafsa Hawley  MRN: [id number]  : 1965  PCP: Cristobal Ann  Home Phone      [phone]  Work Phone      Not on file.  Mobile          [phone]      MESSAGE: having muscle spasms in legs into feet (lanny horses) that wake her from sleep -- requesting appt with Dr Ann or Dr Reza sooner than next available (3/12/24)    Call [phone]    PCP: Seth

## 2024-03-11 ENCOUNTER — INFUSION (OUTPATIENT)
Dept: INFUSION THERAPY | Facility: HOSPITAL | Age: 59
End: 2024-03-11
Attending: INTERNAL MEDICINE
Payer: MEDICAID

## 2024-03-11 VITALS
TEMPERATURE: 97 F | DIASTOLIC BLOOD PRESSURE: 68 MMHG | RESPIRATION RATE: 20 BRPM | HEART RATE: 98 BPM | SYSTOLIC BLOOD PRESSURE: 132 MMHG

## 2024-03-11 DIAGNOSIS — I42.9 CARDIOMYOPATHY, UNSPECIFIED TYPE: Primary | ICD-10-CM

## 2024-03-11 LAB
ALBUMIN SERPL BCP-MCNC: 3.7 G/DL (ref 3.5–5.2)
ALP SERPL-CCNC: 54 U/L (ref 55–135)
ALT SERPL W/O P-5'-P-CCNC: 17 U/L (ref 10–44)
ANION GAP SERPL CALC-SCNC: 14 MMOL/L (ref 8–16)
ANISOCYTOSIS BLD QL SMEAR: ABNORMAL
AST SERPL-CCNC: 23 U/L (ref 10–40)
BASOPHILS # BLD AUTO: 0.04 K/UL (ref 0–0.2)
BASOPHILS NFR BLD: 0.7 % (ref 0–1.9)
BILIRUB SERPL-MCNC: 1.9 MG/DL (ref 0.1–1)
BNP SERPL-MCNC: 449 PG/ML (ref 0–99)
BUN SERPL-MCNC: 85 MG/DL (ref 6–20)
CALCIUM SERPL-MCNC: 9.3 MG/DL (ref 8.7–10.5)
CHLORIDE SERPL-SCNC: 100 MMOL/L (ref 95–110)
CO2 SERPL-SCNC: 24 MMOL/L (ref 23–29)
CREAT SERPL-MCNC: 2.7 MG/DL (ref 0.5–1.4)
DACRYOCYTES BLD QL SMEAR: ABNORMAL
DIFFERENTIAL METHOD BLD: ABNORMAL
EOSINOPHIL # BLD AUTO: 0.1 K/UL (ref 0–0.5)
EOSINOPHIL NFR BLD: 1.7 % (ref 0–8)
ERYTHROCYTE [DISTWIDTH] IN BLOOD BY AUTOMATED COUNT: 28 % (ref 11.5–14.5)
EST. GFR  (NO RACE VARIABLE): 20 ML/MIN/1.73 M^2
GLUCOSE SERPL-MCNC: 157 MG/DL (ref 70–110)
HCT VFR BLD AUTO: 33.8 % (ref 37–48.5)
HGB BLD-MCNC: 10.7 G/DL (ref 12–16)
HYPOCHROMIA BLD QL SMEAR: ABNORMAL
IMM GRANULOCYTES # BLD AUTO: 0.04 K/UL (ref 0–0.04)
IMM GRANULOCYTES NFR BLD AUTO: 0.7 % (ref 0–0.5)
LYMPHOCYTES # BLD AUTO: 0.9 K/UL (ref 1–4.8)
LYMPHOCYTES NFR BLD: 15.8 % (ref 18–48)
MAGNESIUM SERPL-MCNC: 1.8 MG/DL (ref 1.6–2.6)
MCH RBC QN AUTO: 18.6 PG (ref 27–31)
MCHC RBC AUTO-ENTMCNC: 31.7 G/DL (ref 32–36)
MCV RBC AUTO: 59 FL (ref 82–98)
MONOCYTES # BLD AUTO: 0.4 K/UL (ref 0.3–1)
MONOCYTES NFR BLD: 7.5 % (ref 4–15)
NEUTROPHILS # BLD AUTO: 4.2 K/UL (ref 1.8–7.7)
NEUTROPHILS NFR BLD: 73.6 % (ref 38–73)
NRBC BLD-RTO: 0 /100 WBC
OVALOCYTES BLD QL SMEAR: ABNORMAL
PHOSPHATE SERPL-MCNC: 4 MG/DL (ref 2.7–4.5)
PLATELET # BLD AUTO: 147 K/UL (ref 150–450)
PLATELET BLD QL SMEAR: ABNORMAL
PMV BLD AUTO: ABNORMAL FL (ref 9.2–12.9)
POIKILOCYTOSIS BLD QL SMEAR: ABNORMAL
POLYCHROMASIA BLD QL SMEAR: ABNORMAL
POTASSIUM SERPL-SCNC: 3.6 MMOL/L (ref 3.5–5.1)
PROT SERPL-MCNC: 6.7 G/DL (ref 6–8.4)
RBC # BLD AUTO: 5.75 M/UL (ref 4–5.4)
SCHISTOCYTES BLD QL SMEAR: ABNORMAL
SCHISTOCYTES BLD QL SMEAR: PRESENT
SODIUM SERPL-SCNC: 138 MMOL/L (ref 136–145)
WBC # BLD AUTO: 5.76 K/UL (ref 3.9–12.7)

## 2024-03-11 PROCEDURE — 36415 COLL VENOUS BLD VENIPUNCTURE: CPT | Performed by: INTERNAL MEDICINE

## 2024-03-11 PROCEDURE — 83735 ASSAY OF MAGNESIUM: CPT | Performed by: INTERNAL MEDICINE

## 2024-03-11 PROCEDURE — 84100 ASSAY OF PHOSPHORUS: CPT | Performed by: INTERNAL MEDICINE

## 2024-03-11 PROCEDURE — 85025 COMPLETE CBC W/AUTO DIFF WBC: CPT | Performed by: INTERNAL MEDICINE

## 2024-03-11 PROCEDURE — 36591 DRAW BLOOD OFF VENOUS DEVICE: CPT

## 2024-03-11 PROCEDURE — 80053 COMPREHEN METABOLIC PANEL: CPT | Performed by: INTERNAL MEDICINE

## 2024-03-11 PROCEDURE — 83880 ASSAY OF NATRIURETIC PEPTIDE: CPT | Performed by: INTERNAL MEDICINE

## 2024-03-11 NOTE — PROGRESS NOTES
1220  Right upper arm PICC line dressing change done using sterile technique.  Stat lock device, bio patch and tegaderm dressing reapplied  No signs of infection noted.  Tolerated well   dc to home in stable condition

## 2024-03-20 ENCOUNTER — INFUSION (OUTPATIENT)
Dept: INFUSION THERAPY | Facility: HOSPITAL | Age: 59
End: 2024-03-20
Attending: INTERNAL MEDICINE
Payer: MEDICAID

## 2024-03-20 VITALS
SYSTOLIC BLOOD PRESSURE: 128 MMHG | TEMPERATURE: 98 F | DIASTOLIC BLOOD PRESSURE: 92 MMHG | HEART RATE: 103 BPM | RESPIRATION RATE: 22 BRPM

## 2024-03-20 DIAGNOSIS — I42.9 CARDIOMYOPATHY, UNSPECIFIED TYPE: Primary | ICD-10-CM

## 2024-03-20 LAB
ALBUMIN SERPL BCP-MCNC: 3.7 G/DL (ref 3.5–5.2)
ALP SERPL-CCNC: 49 U/L (ref 55–135)
ALT SERPL W/O P-5'-P-CCNC: 15 U/L (ref 10–44)
ANION GAP SERPL CALC-SCNC: 12 MMOL/L (ref 8–16)
ANISOCYTOSIS BLD QL SMEAR: ABNORMAL
AST SERPL-CCNC: 22 U/L (ref 10–40)
BASOPHILS # BLD AUTO: 0.02 K/UL (ref 0–0.2)
BASOPHILS NFR BLD: 0.4 % (ref 0–1.9)
BILIRUB SERPL-MCNC: 1.9 MG/DL (ref 0.1–1)
BNP SERPL-MCNC: 1207 PG/ML (ref 0–99)
BUN SERPL-MCNC: 49 MG/DL (ref 6–20)
CALCIUM SERPL-MCNC: 9.4 MG/DL (ref 8.7–10.5)
CHLORIDE SERPL-SCNC: 105 MMOL/L (ref 95–110)
CO2 SERPL-SCNC: 24 MMOL/L (ref 23–29)
CREAT SERPL-MCNC: 2.1 MG/DL (ref 0.5–1.4)
DACRYOCYTES BLD QL SMEAR: ABNORMAL
DIFFERENTIAL METHOD BLD: ABNORMAL
EOSINOPHIL # BLD AUTO: 0.1 K/UL (ref 0–0.5)
EOSINOPHIL NFR BLD: 1.5 % (ref 0–8)
ERYTHROCYTE [DISTWIDTH] IN BLOOD BY AUTOMATED COUNT: 28.9 % (ref 11.5–14.5)
EST. GFR  (NO RACE VARIABLE): 27 ML/MIN/1.73 M^2
GIANT PLATELETS BLD QL SMEAR: PRESENT
GLUCOSE SERPL-MCNC: 116 MG/DL (ref 70–110)
HCT VFR BLD AUTO: 35.4 % (ref 37–48.5)
HGB BLD-MCNC: 10.8 G/DL (ref 12–16)
HYPOCHROMIA BLD QL SMEAR: ABNORMAL
IMM GRANULOCYTES # BLD AUTO: 0.03 K/UL (ref 0–0.04)
IMM GRANULOCYTES NFR BLD AUTO: 0.5 % (ref 0–0.5)
LYMPHOCYTES # BLD AUTO: 0.6 K/UL (ref 1–4.8)
LYMPHOCYTES NFR BLD: 11.5 % (ref 18–48)
MAGNESIUM SERPL-MCNC: 2 MG/DL (ref 1.6–2.6)
MCH RBC QN AUTO: 18.5 PG (ref 27–31)
MCHC RBC AUTO-ENTMCNC: 30.5 G/DL (ref 32–36)
MCV RBC AUTO: 61 FL (ref 82–98)
MONOCYTES # BLD AUTO: 0.4 K/UL (ref 0.3–1)
MONOCYTES NFR BLD: 7.7 % (ref 4–15)
NEUTROPHILS # BLD AUTO: 4.3 K/UL (ref 1.8–7.7)
NEUTROPHILS NFR BLD: 78.4 % (ref 38–73)
NRBC BLD-RTO: 1 /100 WBC
OVALOCYTES BLD QL SMEAR: ABNORMAL
PHOSPHATE SERPL-MCNC: 3.9 MG/DL (ref 2.7–4.5)
PLATELET # BLD AUTO: 131 K/UL (ref 150–450)
PLATELET BLD QL SMEAR: ABNORMAL
PMV BLD AUTO: ABNORMAL FL (ref 9.2–12.9)
POIKILOCYTOSIS BLD QL SMEAR: ABNORMAL
POLYCHROMASIA BLD QL SMEAR: ABNORMAL
POTASSIUM SERPL-SCNC: 4.1 MMOL/L (ref 3.5–5.1)
PROT SERPL-MCNC: 6.6 G/DL (ref 6–8.4)
RBC # BLD AUTO: 5.83 M/UL (ref 4–5.4)
SCHISTOCYTES BLD QL SMEAR: ABNORMAL
SCHISTOCYTES BLD QL SMEAR: PRESENT
SODIUM SERPL-SCNC: 141 MMOL/L (ref 136–145)
SPHEROCYTES BLD QL SMEAR: ABNORMAL
TARGETS BLD QL SMEAR: ABNORMAL
WBC # BLD AUTO: 5.49 K/UL (ref 3.9–12.7)
WBC TOXIC VACUOLES BLD QL SMEAR: PRESENT

## 2024-03-20 PROCEDURE — 83735 ASSAY OF MAGNESIUM: CPT | Performed by: INTERNAL MEDICINE

## 2024-03-20 PROCEDURE — 83880 ASSAY OF NATRIURETIC PEPTIDE: CPT | Performed by: INTERNAL MEDICINE

## 2024-03-20 PROCEDURE — 36591 DRAW BLOOD OFF VENOUS DEVICE: CPT | Mod: 59

## 2024-03-20 PROCEDURE — 85025 COMPLETE CBC W/AUTO DIFF WBC: CPT | Performed by: INTERNAL MEDICINE

## 2024-03-20 PROCEDURE — 80053 COMPREHEN METABOLIC PANEL: CPT | Performed by: INTERNAL MEDICINE

## 2024-03-20 PROCEDURE — 36415 COLL VENOUS BLD VENIPUNCTURE: CPT | Performed by: INTERNAL MEDICINE

## 2024-03-20 PROCEDURE — 84100 ASSAY OF PHOSPHORUS: CPT | Performed by: INTERNAL MEDICINE

## 2024-03-20 NOTE — PROGRESS NOTES
Blood drawn from picc  Right upper arm PICC line dressing change done using sterile technique. Stat lock device, bio patch and tegaderm dressing reapplied No signs of infection noted. Tolerated well dc to home in stable condition

## 2024-03-26 ENCOUNTER — OFFICE VISIT (OUTPATIENT)
Dept: PALLIATIVE MEDICINE | Facility: CLINIC | Age: 59
End: 2024-03-26
Payer: MEDICAID

## 2024-03-26 VITALS — WEIGHT: 174 LBS | BODY MASS INDEX: 28.08 KG/M2

## 2024-03-26 DIAGNOSIS — I50.9 CONGESTIVE HEART FAILURE, UNSPECIFIED HF CHRONICITY, UNSPECIFIED HEART FAILURE TYPE: Primary | ICD-10-CM

## 2024-03-26 DIAGNOSIS — F41.9 ANXIETY: ICD-10-CM

## 2024-03-26 DIAGNOSIS — Z51.5 PALLIATIVE CARE ENCOUNTER: ICD-10-CM

## 2024-03-26 DIAGNOSIS — R52 PAIN: ICD-10-CM

## 2024-03-26 PROCEDURE — 99214 OFFICE O/P EST MOD 30 MIN: CPT | Mod: 95,,, | Performed by: STUDENT IN AN ORGANIZED HEALTH CARE EDUCATION/TRAINING PROGRAM

## 2024-03-26 PROCEDURE — 3008F BODY MASS INDEX DOCD: CPT | Mod: CPTII,95,, | Performed by: STUDENT IN AN ORGANIZED HEALTH CARE EDUCATION/TRAINING PROGRAM

## 2024-03-26 PROCEDURE — 4010F ACE/ARB THERAPY RXD/TAKEN: CPT | Mod: CPTII,95,, | Performed by: STUDENT IN AN ORGANIZED HEALTH CARE EDUCATION/TRAINING PROGRAM

## 2024-03-26 RX ORDER — SEMAGLUTIDE 0.68 MG/ML
0.5 INJECTION, SOLUTION SUBCUTANEOUS
Qty: 3 ML | Refills: 11 | Status: CANCELLED | OUTPATIENT
Start: 2024-03-26

## 2024-03-26 NOTE — PROGRESS NOTES
Palliative Medicine Clinic Note      Consult Requested By: No ref. provider found    Primary Care Physician:   Cristobal Ann MD    Reason for Consult: Advance care planning and symptom management in the setting of heart Failure     The patient location is: [facility]  The chief complaint leading to consultation is: pain    Visit type: audiovisual    Face to Face time with patient: 24min  40minutes of total time spent on the encounter, which includes face to face time and non-face to face time preparing to see the patient (eg, review of tests), Obtaining and/or reviewing separately obtained history, Documenting clinical information in the electronic or other health record, Independently interpreting results (not separately reported) and communicating results to the patient/family/caregiver, or Care coordination (not separately reported).       Each patient provided with medical services by telemedicine is:  (1) informed of the relationship between the physician and patient and the respective role of any other health care provider with respect to management of the patient; and (2) notified that he or she may decline to receive medical services by telemedicine and may withdraw from such care at any time.          ASSESSMENT/PLAN:     Plan/Recommendations:  Diagnoses and all orders for this visit:      Congestive heart failure, unspecified HF chronicity, unspecified heart failure type /  Acute decompensated heart failure / Pulmonary HTN  -NICM since 2013 HFrEF (EF 10%) s/p AICD placement on palliative dobutamine   -Not a candidate for advance therapies   -recent admission for ADHF in December  -On Bumex and Metolazone, discussed the importance of taking this diuretics     Pain   Bilateral lower extremity, sharp burning and tingling pain  -Lyrica 50 mg q.h.s.  -Norco 10-325mg q 6H PRN  -On allopurinol and probenecid managed by PCP, discussed eConsult recommendation she will wait until her rheumatology appt   2/2024 because she has not been having symptoms on prednisone  - Discussed the patient's leg pain and its possible relation to the intake of metolazone and Bumex, use heat and muscle relaxant for cramps if needed    Fatigue/dyspnea  -discussed the importance of graded exercise  -continues to walk around the house, however describes more weakness in her legs and stairs or getting difficult      Depression/anxiety  - worsening depression  - Offered to have  Jeane and therapist Brianna reach out for support.  - Explored the patient's stress related to family issues and its impact on her health, suggesting ways to manage it.  - Encouraged the patient to continue walking around, spending time with grandkids, and engaging in enjoyable activities like gardening.    Nausea  -  ondansetron (ZOFRAN-ODT) 8 MG  every 12 (twelve) hours PRN      Constipation  - Miralax        Palliative care encounter  Medicolegal: Has decision making capacity. Named her daughter Erika Estrada is SHIRA.     Goals of care:    ACP Date: 07/11/2023  I engaged the patient and   in a voluntary conversation about advance care planning and we specifically addressed what the goals of care would be moving forward, in light of the patient's change in clinical status, specifically worsening heart failure with multiple admissions.  We did specifically address the patient's likely prognosis, which is poor.  I shared that in the best case scenario her prognosis might be month and that given the nature of her heart condition she can die any time. I did explain the role for hospice care at this stage of the patient's illness, including its ability to help the patient live with the best quality of life possible.  We explored the patient's values and preferences for future care.  The patient endorses that what is most important right now is to focus on curative/life-prolongation (regardless of treatment burdens),.  She states that she is doing  everything in [facility] because she is hoping to obtain a heart transplant.  Her  encouraged her to do everything as fast as possible so that she can go to [place] soon. Accordingly, we have decided that the best plan to meet the patient's goals includes continuing with treatment      ACP Date: 05/24/2023  I engaged the patient in a conversation about advance care planning and we specifically addressed what the goals of care would be moving forward, in light of the patient's change in clinical status, specifically now on palliative dobutamine.  We did specifically address the patient's likely prognosis, which is poor.  We discussed that dobutamine is mainly to help her feel better and will not change her heart condition. We explored the patient's values and preferences for future care.  The patient endorses that what is most important right now is to focus on remaining as independent as possible, symptom/pain control, and curative/life-prolongation (regardless of treatment burdens).  She shared that she is hoping for the better that she is hoping to get better.  She hopes that her heart will get better, she shared that she knows that she has been told in the past that it will not improve.  She shared that she is hoping for a miracle to happen so that her heart improves.  She is also hoping to be able to spend more time with family to be part of the activities to go out and enjoy eating with them.  Accordingly, we have decided that the best plan to meet the patient's goals includes continuing with treatment      ACP Date: 02/27/2023  I engaged the patient in a conversation about advance care planning.  We discussed HCPOA, she wanted to change her current HCPOA and name her daughter Erika Estrada as her agent and her sister and son as backups. We specifically addressed what the goals of care would be moving forward, in light of the patient's change in clinical status,  specifically transplant workup.  We did specifically address the patient's likely prognosis, which is poor w/o a trasnplant.  We explored the patient's values and preferences for future care.  The patient endorses that what is most important right now is to focus on curative/life-prolongation (regardless of treatment burdens)  Accordingly, we have decided that the best plan to meet the patient's goals includes continuing with treatment      Code status:  Full code    Advance directives:HCPOA on file       min time was spent on advance care planning, goals of care discussion, emotional support, formulating and communicating prognosis and goals of care, exploring burden/benefit of various approaches of treatment.          Follow up: 4m         SUBJECTIVE:     History obtained from: patient     complaint: No chief complaint on file.      History of Present Illness / Interval History:  Hafsa Hawley is 58 y.o. year old female presenting with heart failure .  Referred to Palliative Care for evaluation and management of physical symptoms. female attended the appointment alone     The patient reports a recent fall on both sides, resulting in a broken rib and knee pain. She has been taking pain medications as needed. She describes a pain in the leg that feels like a cramp, lasting up to 20 minutes, and occurs mostly during sleep. She has been taking Bumex and metolazone for fluid management, but has not taken metolazone for a week. She experiences frequent urination, sometimes involuntarily, and has been wearing pads for protection. She has been spending time with her grandchildren and engaging in activities such as walking and gardening. She has had stress due to a family situation involving her son. She denies any recent changes in medication or any significant improvement in leg pain.    -------    Lower extremity pain has been well-controlled on prednisone 5 mg.  She is feeling better.  Minimal shortness of  breath might be sick with a cold.  She continues to have significant anxiety.  Has significant nausea and constipation.  Her weight is up and down however she her dry weight is around 179 lb.  Unable to sleep well at night.    --------    Recent ED visits due to lower extremity pain  Continues to have pain in her left food and right knee.  Describes a sharp pain and she is taking her pain medication which helps with her pain but does not completely resolve it.  She takes Tylenol as well.  Describes her lower extremity pain as burning and tingling and sometimes sharp  Worsening anxiety everyday all day.  Taking Zofran daily  Not constipated she is taking Dulcolax  Not on Ozempic for now because of a high co-pay  She is stopped Lexapro    ---------    [facility] admission for ADHF 6/27  CKD plus [place] from cardiorenal syndrome and ADCHF. She now is close to euvolemic. On  dobutamine 5mcg/kg/min and Bumex   She reports nausea occasionally, having daily bowel movements.  Has cough all day however she does not feel swollen.  He feels her legs are weaker and now stairs her becoming difficult to navigate.    ----------  She states that she has not been feeling good.  She has abdominal pain significant constipation.  She also describes nausea and bloating.  She shared that her medications are constantly changing so it is difficult for her to keep track of those changes.  She is compliant with medications she is drinking some water because she feels very dry.  She has an appointment with Cardiology on the 17th    -----    She reports that she is feeling okay now that she is on the dobutamine pump.  She feels it has made a difference in her energy level and that she is not eating too much oxygen anymore.  She continues to hope for improvement.  However she is unable to do the things that she enjoys like spending time with her grandkids and going out because sometimes she is not feeling well.  She feels fatigued she has a lot of pain  in her knees and her hips and legs.  She is unable to walk at times.  She also endorsed decreased appetite and significant nausea that improves with Zofran however she needs to take 8 mg for it to work.         Disease History:  NICM since 2013 HFrEF (EF 10%) s/p AICD placement 3/9/22 declined for OHT or LVAD due to renal function, lung function   pulmonary hypertension  chronic hypoxic and hypercapnic respiratory failure   Paroxysmal A Fib on Eliquis, Pulmonary Hypertension  Hypertension, Type 2 Diabetes Mellitus           External  database queried on 3/26/24 by Sandra Hernandes .   The results reviewed and considered with the clinical data in the decision whether or not to prescribe a controlled substance.    03/18/2024 02/15/2024 3 Alprazolam 2 Mg Tablet 60.00 30 Ch Par [id number] Och (1974) 1 8.00 LME Comm [place]   03/07/2024 03/06/2024 3 Pregabalin 100 Mg Capsule 30.00 30 Mi Bac [id number] Och (1974) 0 0.67 [place]   02/28/2024 02/28/2024 3 Hydrocodone-Acetamin  Mg 120.00 30 Er Kaykay [id number] Och (1974) 0 40.00 [place]   02/21/2024 02/20/2024 3 Promethazine-Codeine Solution 473.00 24 Ch Par [id number] Och (1974) 0 5.91 [place]   02/15/2024 02/15/2024 3 Alprazolam 2 Mg Tablet 60.00 30 Ch Par [id number] Och (1974) 0 8.00 [place]       Medications:    Current Outpatient Medications:     albuterol-ipratropium (DUO-NEB) 2.5 mg-0.5 mg/3 mL nebulizer solution, Take 3 mLs by nebulization every 6 (six) hours as needed for Wheezing or Shortness of Breath., Disp: 90 mL, Rfl: 3    allopurinoL (ZYLOPRIM) 300 MG tablet, Take 1 tablet (300 mg total) by mouth once daily., Disp: 90 tablet, Rfl: 3    ALPRAZolam (XANAX) 2 MG Tab, Take 1 tablet orally 2 times a day., Disp: 60 tablet, Rfl: 4    apixaban (ELIQUIS) 2.5 mg Tab, Take 1 tablet (2.5 mg total) by mouth 2 (two) times daily., Disp: 60 tablet, Rfl: 12    atorvastatin (LIPITOR) 40 MG tablet, Take 1 tablet (40 mg total) by mouth  every evening., Disp: 90 tablet, Rfl: 8    b complex vitamins tablet, Take 1 tablet by mouth once daily., Disp: , Rfl:     blood sugar diagnostic (ACCU-CHEK GUIDE TEST STRIPS) Strp, 1 strip by Other route 3 (three) times daily., Disp: 100 each, Rfl: 11    blood-glucose sensor (DEXCOM G7 SENSOR) Evon, change sensor every 10 days., Disp: 3 each, Rfl: 11    bumetanide (BUMEX) 2 MG tablet, Take 2 tablets (4mg total) by mouth in morning and take 1 tablet (2mg total) by mouth in the evening (no later then 5pm)., Disp: 90 tablet, Rfl: 5    cetirizine (ZYRTEC) 10 MG tablet, Take 1 tablet (10 mg total) by mouth once daily., Disp: 30 tablet, Rfl: 12    diclofenac sodium (VOLTAREN) 1 % Gel, Apply 2 g topically 3 (three) times daily as needed for pain., Disp: 300 g, Rfl: 1    DOBUTamine (DOBUTREX) 1,000 mg/250 mL (4,000 mcg/mL) infusion, Inject 409 mcg/min into the vein continuous., Disp: , Rfl:     empagliflozin (JARDIANCE) 25 mg tablet, Take 1 tablet orally once a day., Disp: 30 tablet, Rfl: 12    ergocalciferol (ERGOCALCIFEROL) 50,000 unit Cap, Take 1 capsule (50,000 Units total) by mouth once a week., Disp: 4 capsule, Rfl: 11    famotidine (PEPCID) 10 MG tablet, Take 1 tablet (10 mg total) by mouth 2 (two) times daily., Disp: 60 tablet, Rfl: 12    febuxostat (ULORIC) 40 mg Tab, Take 40 mg by mouth once daily., Disp: , Rfl:     ferrous sulfate (FEOSOL) 325 mg (65 mg iron) Tab tablet, Take 1 tablet (325 mg total) by mouth 3 (three) times daily., Disp: 90 tablet, Rfl: 12    fluticasone propionate (FLONASE ALLERGY RELIEF) 50 mcg/actuation nasal spray, 2 sprays (100 mcg total) by Each Nostril route once daily., Disp: 16 g, Rfl: 12    HYDROcodone-acetaminophen (NORCO)  mg per tablet, Take 1 tablet by mouth every 6 (six) hours as needed for Pain., Disp: 120 tablet, Rfl: 0    isosorbide-hydrALAZINE 20-37.5 mg (BIDIL) 20-37.5 mg Tab, Take by mouth., Disp: , Rfl:     JARDIANCE 25 mg tablet, Take 25 mg by mouth., Disp: , Rfl:      lancets (ONETOUCH DELICA PLUS LANCET) 30 gauge Misc, by Misc.(Non-Drug; Combo Route) route 3 (three) times daily., Disp: 100 each, Rfl: 11    LIDOcaine (LIDODERM) 5 %, Place 1 patch onto the skin once daily. Remove & discard patch within 12 hours or as directed by MD., Disp: 30 patch, Rfl: 2    magnesium oxide (MAG-OX) 400 mg (241.3 mg magnesium) tablet, Take 1 tablet (400 mg total) by mouth every evening., Disp: 30 tablet, Rfl: 5    methocarbamoL (ROBAXIN) 500 MG Tab, Take 1-2 tablets by mouth every 8 hours as needed for spasms, Disp: 30 tablet, Rfl: 0    metOLazone (ZAROXOLYN) 5 MG tablet, Take 1 tablet orally once a day., Disp: 90 tablet, Rfl: 4    mometasone-formoterol (DULERA) 200-5 mcg/actuation inhaler, Inhale 2 puffs into the lungs 2 (two) times daily. Controller, Disp: 13 g, Rfl: 11    nystatin (MYCOSTATIN) 100,000 unit/mL suspension, Take 5 mLs (500,000 Units total) by mouth 3 (three) times daily as needed., Disp: 473 mL, Rfl: 12    ondansetron (ZOFRAN-ODT) 8 MG TbDL, Take 1 tablet (8 mg total) by mouth every 8 (eight) hours as needed (nausea)., Disp: 30 tablet, Rfl: 4    predniSONE (DELTASONE) 5 MG tablet, Take 1 tablet (5 mg total) by mouth once daily., Disp: 30 tablet, Rfl: 3    pregabalin (LYRICA) 100 MG capsule, Take 1 capsule (100 mg total) by mouth every evening., Disp: 30 capsule, Rfl: 2    sacubitriL-valsartan (ENTRESTO) 24-26 mg per tablet, Take 1 tablet by mouth 2 (two) times daily., Disp: 60 tablet, Rfl: 12    scopolamine (TRANSDERM-SCOP) 1.3-1.5 mg (1 mg over 3 days), Place 1 patch onto the skin every 72 hours as needed for nausea., Disp: 24 patch, Rfl: 2    semaglutide (OZEMPIC) 1 mg/dose (4 mg/3 mL), Inject 1 mg into the skin every 7 days., Disp: 3 mL, Rfl: 11    SPIRIVA WITH HANDIHALER 18 mcg inhalation capsule, Inhale 1 capsule (18 mcg total) into the lungs once daily., Disp: 30 capsule, Rfl: 5    terconazole (TERAZOL 3) 0.8 % vaginal cream, Place 1 applicator vaginally once daily., Disp:  "20 g, Rfl: 1    VENTOLIN HFA 90 mcg/actuation inhaler, Inhale 2 puffs into the lungs every 6 (six) hours as needed for Shortness of Breath or Wheezing., Disp: 18 g, Rfl: 11    Current Facility-Administered Medications:     heparin, porcine (PF) 100 unit/mL injection flush 500 Units, 5 mL, Intravenous, Once, Benson Cortez MD    heparin, porcine (PF) 100 unit/mL injection flush 500 Units, 5 mL, Intravenous, Once, Benson Cortez MD    Facility-Administered Medications Ordered in Other Visits:     0.9%  NaCl infusion, , Intravenous, Continuous, Corinna Hayes, NP, New Bag at 05/23/19 0745    vancomycin in dextrose 5 % 1 gram/250 mL IVPB 1,000 mg, 1,000 mg, Intravenous, On Call Procedure, Corinna Hayes NP, 1,000 mg at 05/23/19 0736      Review of patient's allergies indicates:   Allergen Reactions    Penicillins Hives and Other (See Comments)    Iodinated contrast media Nausea And Vomiting    Oxycodone-acetaminophen Other (See Comments)     Nausea, Dizziness, Anxiety.  "I don't like how it makes me feel."   Given Hydromorphone 0.5mg IVP  Without problems.  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 Metabolism of other drugs not listed here may also be impacted by UGT1A1 enzyme activity.       Tramadol Nausea And Vomiting and Other (See Comments)     Other reaction(s): Other (See Comments)    Valsartan Other (See Comments)       OBJECTIVE: " "      Review of Symptoms      Symptom Assessment (ESAS 0-10 Scale)  Pain:  0  Dyspnea:  0  Anxiety:  0  Nausea:  0  Depression:  0  Anorexia:  0  Fatigue:  0  Insomnia:  0  Restlessness:  0  Agitation:  0     CAM / Delirium:  Negative  Constipation:  Negative  Diarrhea:  Negative    Anxiety:  Is not nervous/anxious  Constipation:  Constipation    Comments:  Miralax    Pain Assessment:    Location(s): none      Performance Status:  60    Living Arrangements:  Lives with family    Psychosocial/Cultural:   See Palliative Psychosocial Note: Yes  Lives with  and granddaughter. Pt lives in a mobile home with 5 NIRAJ. Pt also has support from her two sisters Benoit Baum [phone], Jeanie Jaimes [phone]. Pt is mostly independent with ADL's, but has a RW, SC and home oxygen, which she states she uses "as needed".   dgtr Erika Estrada, 33, [place], son Miguel Baum, 35  **Primary  to Follow**  Palliative Care  Consult: Yes    Spiritual:  F - Damaris and Belief:  Episcopal       Advance Care Planning   Advance Directives:   Living Will: No    LaPOST: No    Do Not Resuscitate Status: No    Medical Power of : Yes    Agent's Name:  Erika Estrada    Decision Making:  Patient answered questions  Goals of Care: What is most important right now is to focus on curative/life-prolongation (regardless of treatment burdens), remaining as independent as possible, symptom/pain control. Accordingly, we have decided that the best plan to meet the patient's goals includes continuing with treatment.    She enjoys spending time with her grandkids and going fishing. Her goal is to maintain her health and continue doing activities she enjoys.              Physical Exam:  Vitals:    Physical Exam  Constitutional:       General: She is not in acute distress.  Pulmonary:      Effort: Pulmonary effort is normal. No respiratory distress.   Musculoskeletal:      Cervical back: Neck supple.   Neurological:     "  Mental Status: She is alert and oriented to person, place, and time.   Psychiatric:         Mood and Affect: Mood and affect normal.         I spent a total of 32 minutes on the day of the visit. This includes face to face time in discussion of goals of care, symptom assessment, coordination of care and emotional support.  This also includes non-face to face time preparing to see the patient (eg, review of tests/imaging), obtaining and/or reviewing separately obtained history, documenting clinical information in the electronic or other health record, independently interpreting results and communicating results to the patient/family/caregiver, or care coordinator.         This note was generated with the assistance of ambient listening technology. Verbal consent was obtained by the patient and accompanying visitor(s) for the recording of patient appointment to facilitate this note. I attest to having reviewed and edited the generated note for accuracy, though some syntax or spelling errors may persist. Please contact the author of this note for any clarification.      Signature: Sandra Hernandes MD

## 2024-03-27 ENCOUNTER — INFUSION (OUTPATIENT)
Dept: INFUSION THERAPY | Facility: HOSPITAL | Age: 59
End: 2024-03-27
Attending: INTERNAL MEDICINE
Payer: MEDICAID

## 2024-03-27 VITALS
HEART RATE: 89 BPM | TEMPERATURE: 98 F | SYSTOLIC BLOOD PRESSURE: 107 MMHG | DIASTOLIC BLOOD PRESSURE: 77 MMHG | RESPIRATION RATE: 20 BRPM

## 2024-03-27 DIAGNOSIS — I42.9 CARDIOMYOPATHY, UNSPECIFIED TYPE: Primary | ICD-10-CM

## 2024-03-27 LAB
ALBUMIN SERPL BCP-MCNC: 3.7 G/DL (ref 3.5–5.2)
ALP SERPL-CCNC: 50 U/L (ref 55–135)
ALT SERPL W/O P-5'-P-CCNC: 13 U/L (ref 10–44)
ANION GAP SERPL CALC-SCNC: 10 MMOL/L (ref 8–16)
ANISOCYTOSIS BLD QL SMEAR: ABNORMAL
AST SERPL-CCNC: 16 U/L (ref 10–40)
BASOPHILS # BLD AUTO: 0.02 K/UL (ref 0–0.2)
BASOPHILS NFR BLD: 0.4 % (ref 0–1.9)
BILIRUB SERPL-MCNC: 1.8 MG/DL (ref 0.1–1)
BNP SERPL-MCNC: 1299 PG/ML (ref 0–99)
BUN SERPL-MCNC: 49 MG/DL (ref 6–20)
CALCIUM SERPL-MCNC: 9.5 MG/DL (ref 8.7–10.5)
CHLORIDE SERPL-SCNC: 106 MMOL/L (ref 95–110)
CO2 SERPL-SCNC: 25 MMOL/L (ref 23–29)
CREAT SERPL-MCNC: 2.2 MG/DL (ref 0.5–1.4)
DACRYOCYTES BLD QL SMEAR: ABNORMAL
DIFFERENTIAL METHOD BLD: ABNORMAL
EOSINOPHIL # BLD AUTO: 0 K/UL (ref 0–0.5)
EOSINOPHIL NFR BLD: 0.8 % (ref 0–8)
ERYTHROCYTE [DISTWIDTH] IN BLOOD BY AUTOMATED COUNT: 29.3 % (ref 11.5–14.5)
EST. GFR  (NO RACE VARIABLE): 25 ML/MIN/1.73 M^2
GIANT PLATELETS BLD QL SMEAR: PRESENT
GLUCOSE SERPL-MCNC: 152 MG/DL (ref 70–110)
HCT VFR BLD AUTO: 35.3 % (ref 37–48.5)
HGB BLD-MCNC: 10.3 G/DL (ref 12–16)
HOWELL-JOLLY BOD BLD QL SMEAR: ABNORMAL
HYPOCHROMIA BLD QL SMEAR: ABNORMAL
IMM GRANULOCYTES # BLD AUTO: 0.03 K/UL (ref 0–0.04)
IMM GRANULOCYTES NFR BLD AUTO: 0.6 % (ref 0–0.5)
LYMPHOCYTES # BLD AUTO: 0.5 K/UL (ref 1–4.8)
LYMPHOCYTES NFR BLD: 10 % (ref 18–48)
MAGNESIUM SERPL-MCNC: 1.9 MG/DL (ref 1.6–2.6)
MCH RBC QN AUTO: 18.2 PG (ref 27–31)
MCHC RBC AUTO-ENTMCNC: 29.2 G/DL (ref 32–36)
MCV RBC AUTO: 63 FL (ref 82–98)
MONOCYTES # BLD AUTO: 0.3 K/UL (ref 0.3–1)
MONOCYTES NFR BLD: 5.5 % (ref 4–15)
NEUTROPHILS # BLD AUTO: 4 K/UL (ref 1.8–7.7)
NEUTROPHILS NFR BLD: 82.7 % (ref 38–73)
NRBC BLD-RTO: 1 /100 WBC
OVALOCYTES BLD QL SMEAR: ABNORMAL
PHOSPHATE SERPL-MCNC: 3.6 MG/DL (ref 2.7–4.5)
PLATELET # BLD AUTO: 131 K/UL (ref 150–450)
PLATELET BLD QL SMEAR: ABNORMAL
PMV BLD AUTO: ABNORMAL FL (ref 9.2–12.9)
POIKILOCYTOSIS BLD QL SMEAR: ABNORMAL
POLYCHROMASIA BLD QL SMEAR: ABNORMAL
POTASSIUM SERPL-SCNC: 4.3 MMOL/L (ref 3.5–5.1)
PROT SERPL-MCNC: 6.5 G/DL (ref 6–8.4)
RBC # BLD AUTO: 5.65 M/UL (ref 4–5.4)
SCHISTOCYTES BLD QL SMEAR: ABNORMAL
SCHISTOCYTES BLD QL SMEAR: PRESENT
SODIUM SERPL-SCNC: 141 MMOL/L (ref 136–145)
SPHEROCYTES BLD QL SMEAR: ABNORMAL
WBC # BLD AUTO: 4.88 K/UL (ref 3.9–12.7)

## 2024-03-27 PROCEDURE — 84100 ASSAY OF PHOSPHORUS: CPT | Performed by: INTERNAL MEDICINE

## 2024-03-27 PROCEDURE — 85025 COMPLETE CBC W/AUTO DIFF WBC: CPT | Performed by: INTERNAL MEDICINE

## 2024-03-27 PROCEDURE — 83880 ASSAY OF NATRIURETIC PEPTIDE: CPT | Performed by: INTERNAL MEDICINE

## 2024-03-27 PROCEDURE — 80053 COMPREHEN METABOLIC PANEL: CPT | Performed by: INTERNAL MEDICINE

## 2024-03-27 PROCEDURE — 36415 COLL VENOUS BLD VENIPUNCTURE: CPT | Performed by: INTERNAL MEDICINE

## 2024-03-27 PROCEDURE — 83735 ASSAY OF MAGNESIUM: CPT | Performed by: INTERNAL MEDICINE

## 2024-03-27 PROCEDURE — 36591 DRAW BLOOD OFF VENOUS DEVICE: CPT | Mod: 59

## 2024-03-27 RX ORDER — HEPARIN 100 UNIT/ML
5 SYRINGE INTRAVENOUS ONCE
Status: DISCONTINUED | OUTPATIENT
Start: 2024-03-27 | End: 2024-05-06 | Stop reason: CLARIF

## 2024-03-27 NOTE — PROGRESS NOTES
Right upper arm PICC line dressing change done using sterile technique.  No signs of infection noted.  Stat lock device, bio patch and tegaderm reapplied.  Tolerated well

## 2024-04-02 DIAGNOSIS — R52 PAIN: ICD-10-CM

## 2024-04-02 DIAGNOSIS — M10.9 GOUT, ARTHRITIS: ICD-10-CM

## 2024-04-02 RX ORDER — HYDROCODONE BITARTRATE AND ACETAMINOPHEN 10; 325 MG/1; MG/1
1 TABLET ORAL EVERY 6 HOURS PRN
Qty: 120 TABLET | Refills: 0 | Status: CANCELLED | OUTPATIENT
Start: 2024-04-02

## 2024-04-02 RX ORDER — ALLOPURINOL 300 MG/1
300 TABLET ORAL DAILY
Qty: 90 TABLET | Refills: 1 | Status: SHIPPED | OUTPATIENT
Start: 2024-04-02

## 2024-04-02 RX ORDER — ALLOPURINOL 300 MG/1
300 TABLET ORAL DAILY
Qty: 90 TABLET | Refills: 3 | Status: CANCELLED | OUTPATIENT
Start: 2024-04-02

## 2024-04-03 ENCOUNTER — INFUSION (OUTPATIENT)
Dept: INFUSION THERAPY | Facility: HOSPITAL | Age: 59
End: 2024-04-03
Attending: INTERNAL MEDICINE
Payer: MEDICAID

## 2024-04-03 VITALS
DIASTOLIC BLOOD PRESSURE: 84 MMHG | HEART RATE: 111 BPM | SYSTOLIC BLOOD PRESSURE: 127 MMHG | RESPIRATION RATE: 24 BRPM | TEMPERATURE: 97 F

## 2024-04-03 DIAGNOSIS — I42.9 CARDIOMYOPATHY, UNSPECIFIED TYPE: Primary | ICD-10-CM

## 2024-04-03 LAB
ALBUMIN SERPL BCP-MCNC: 3.4 G/DL (ref 3.5–5.2)
ALP SERPL-CCNC: 44 U/L (ref 55–135)
ALT SERPL W/O P-5'-P-CCNC: 17 U/L (ref 10–44)
ANION GAP SERPL CALC-SCNC: 8 MMOL/L (ref 8–16)
ANISOCYTOSIS BLD QL SMEAR: ABNORMAL
AST SERPL-CCNC: 19 U/L (ref 10–40)
BASOPHILS # BLD AUTO: 0.02 K/UL (ref 0–0.2)
BASOPHILS NFR BLD: 0.4 % (ref 0–1.9)
BILIRUB SERPL-MCNC: 1.6 MG/DL (ref 0.1–1)
BNP SERPL-MCNC: 2017 PG/ML (ref 0–99)
BUN SERPL-MCNC: 63 MG/DL (ref 6–20)
CALCIUM SERPL-MCNC: 8.5 MG/DL (ref 8.7–10.5)
CHLORIDE SERPL-SCNC: 109 MMOL/L (ref 95–110)
CO2 SERPL-SCNC: 21 MMOL/L (ref 23–29)
CREAT SERPL-MCNC: 2.4 MG/DL (ref 0.5–1.4)
DACRYOCYTES BLD QL SMEAR: ABNORMAL
DIFFERENTIAL METHOD BLD: ABNORMAL
EOSINOPHIL # BLD AUTO: 0.1 K/UL (ref 0–0.5)
EOSINOPHIL NFR BLD: 2 % (ref 0–8)
ERYTHROCYTE [DISTWIDTH] IN BLOOD BY AUTOMATED COUNT: 29.6 % (ref 11.5–14.5)
EST. GFR  (NO RACE VARIABLE): 23 ML/MIN/1.73 M^2
GIANT PLATELETS BLD QL SMEAR: PRESENT
GLUCOSE SERPL-MCNC: 231 MG/DL (ref 70–110)
HCT VFR BLD AUTO: 31.1 % (ref 37–48.5)
HGB BLD-MCNC: 9.1 G/DL (ref 12–16)
HOWELL-JOLLY BOD BLD QL SMEAR: ABNORMAL
HYPOCHROMIA BLD QL SMEAR: ABNORMAL
IMM GRANULOCYTES # BLD AUTO: 0.04 K/UL (ref 0–0.04)
IMM GRANULOCYTES NFR BLD AUTO: 0.7 % (ref 0–0.5)
LYMPHOCYTES # BLD AUTO: 0.6 K/UL (ref 1–4.8)
LYMPHOCYTES NFR BLD: 11.2 % (ref 18–48)
MAGNESIUM SERPL-MCNC: 1.7 MG/DL (ref 1.6–2.6)
MCH RBC QN AUTO: 18.3 PG (ref 27–31)
MCHC RBC AUTO-ENTMCNC: 29.3 G/DL (ref 32–36)
MCV RBC AUTO: 63 FL (ref 82–98)
MONOCYTES # BLD AUTO: 0.4 K/UL (ref 0.3–1)
MONOCYTES NFR BLD: 7.6 % (ref 4–15)
NEUTROPHILS # BLD AUTO: 4.2 K/UL (ref 1.8–7.7)
NEUTROPHILS NFR BLD: 78.1 % (ref 38–73)
NRBC BLD-RTO: 1 /100 WBC
OVALOCYTES BLD QL SMEAR: ABNORMAL
PHOSPHATE SERPL-MCNC: 3.7 MG/DL (ref 2.7–4.5)
PLATELET # BLD AUTO: 190 K/UL (ref 150–450)
PLATELET BLD QL SMEAR: ABNORMAL
PMV BLD AUTO: ABNORMAL FL (ref 9.2–12.9)
POIKILOCYTOSIS BLD QL SMEAR: ABNORMAL
POLYCHROMASIA BLD QL SMEAR: ABNORMAL
POTASSIUM SERPL-SCNC: 4 MMOL/L (ref 3.5–5.1)
PROT SERPL-MCNC: 5.7 G/DL (ref 6–8.4)
RBC # BLD AUTO: 4.96 M/UL (ref 4–5.4)
SCHISTOCYTES BLD QL SMEAR: ABNORMAL
SODIUM SERPL-SCNC: 138 MMOL/L (ref 136–145)
SPHEROCYTES BLD QL SMEAR: ABNORMAL
WBC # BLD AUTO: 5.43 K/UL (ref 3.9–12.7)
WBC TOXIC VACUOLES BLD QL SMEAR: PRESENT

## 2024-04-03 PROCEDURE — 36415 COLL VENOUS BLD VENIPUNCTURE: CPT | Performed by: INTERNAL MEDICINE

## 2024-04-03 PROCEDURE — 80053 COMPREHEN METABOLIC PANEL: CPT | Performed by: INTERNAL MEDICINE

## 2024-04-03 PROCEDURE — 83880 ASSAY OF NATRIURETIC PEPTIDE: CPT | Performed by: INTERNAL MEDICINE

## 2024-04-03 PROCEDURE — 85025 COMPLETE CBC W/AUTO DIFF WBC: CPT | Performed by: INTERNAL MEDICINE

## 2024-04-03 PROCEDURE — 36591 DRAW BLOOD OFF VENOUS DEVICE: CPT | Mod: 59

## 2024-04-03 PROCEDURE — 84100 ASSAY OF PHOSPHORUS: CPT | Performed by: INTERNAL MEDICINE

## 2024-04-03 PROCEDURE — 83735 ASSAY OF MAGNESIUM: CPT | Performed by: INTERNAL MEDICINE

## 2024-04-03 RX ORDER — HYDROCODONE BITARTRATE AND ACETAMINOPHEN 10; 325 MG/1; MG/1
1 TABLET ORAL EVERY 6 HOURS PRN
Qty: 120 TABLET | Refills: 0 | Status: SHIPPED | OUTPATIENT
Start: 2024-04-03 | End: 2024-05-01 | Stop reason: SDUPTHER

## 2024-04-03 NOTE — PROGRESS NOTES
Right upper arm PICC line dressing change done using sterile technique.  No signs of infection noted.  Bio patch, tegaderm dressing and stat lock device applied.  Tolerated well.

## 2024-04-08 ENCOUNTER — INFUSION (OUTPATIENT)
Dept: INFUSION THERAPY | Facility: HOSPITAL | Age: 59
End: 2024-04-08
Attending: INTERNAL MEDICINE
Payer: MEDICAID

## 2024-04-08 ENCOUNTER — OFFICE VISIT (OUTPATIENT)
Dept: OBSTETRICS AND GYNECOLOGY | Facility: CLINIC | Age: 59
End: 2024-04-08
Payer: MEDICAID

## 2024-04-08 VITALS
DIASTOLIC BLOOD PRESSURE: 82 MMHG | TEMPERATURE: 97 F | RESPIRATION RATE: 20 BRPM | SYSTOLIC BLOOD PRESSURE: 127 MMHG | HEART RATE: 105 BPM

## 2024-04-08 VITALS
WEIGHT: 180 LBS | DIASTOLIC BLOOD PRESSURE: 68 MMHG | HEIGHT: 66 IN | SYSTOLIC BLOOD PRESSURE: 130 MMHG | HEART RATE: 72 BPM | BODY MASS INDEX: 28.93 KG/M2

## 2024-04-08 DIAGNOSIS — N89.8 VAGINAL DISCHARGE: Primary | ICD-10-CM

## 2024-04-08 DIAGNOSIS — I42.9 CARDIOMYOPATHY, UNSPECIFIED TYPE: Primary | ICD-10-CM

## 2024-04-08 LAB
ACANTHOCYTES BLD QL SMEAR: PRESENT
ALBUMIN SERPL BCP-MCNC: 3.8 G/DL (ref 3.5–5.2)
ALP SERPL-CCNC: 49 U/L (ref 55–135)
ALT SERPL W/O P-5'-P-CCNC: 15 U/L (ref 10–44)
ANION GAP SERPL CALC-SCNC: 14 MMOL/L (ref 8–16)
ANISOCYTOSIS BLD QL SMEAR: ABNORMAL
AST SERPL-CCNC: 20 U/L (ref 10–40)
BASOPHILS # BLD AUTO: 0.03 K/UL (ref 0–0.2)
BASOPHILS NFR BLD: 0.5 % (ref 0–1.9)
BILIRUB SERPL-MCNC: 2.1 MG/DL (ref 0.1–1)
BNP SERPL-MCNC: 543 PG/ML (ref 0–99)
BUN SERPL-MCNC: 61 MG/DL (ref 6–20)
BURR CELLS BLD QL SMEAR: ABNORMAL
CALCIUM SERPL-MCNC: 9.5 MG/DL (ref 8.7–10.5)
CHLORIDE SERPL-SCNC: 102 MMOL/L (ref 95–110)
CO2 SERPL-SCNC: 23 MMOL/L (ref 23–29)
CREAT SERPL-MCNC: 2.9 MG/DL (ref 0.5–1.4)
DACRYOCYTES BLD QL SMEAR: ABNORMAL
DIFFERENTIAL METHOD BLD: ABNORMAL
EOSINOPHIL # BLD AUTO: 0.1 K/UL (ref 0–0.5)
EOSINOPHIL NFR BLD: 1.4 % (ref 0–8)
ERYTHROCYTE [DISTWIDTH] IN BLOOD BY AUTOMATED COUNT: 29.5 % (ref 11.5–14.5)
EST. GFR  (NO RACE VARIABLE): 18 ML/MIN/1.73 M^2
GIANT PLATELETS BLD QL SMEAR: PRESENT
GLUCOSE SERPL-MCNC: 138 MG/DL (ref 70–110)
HCT VFR BLD AUTO: 35.2 % (ref 37–48.5)
HGB BLD-MCNC: 10.6 G/DL (ref 12–16)
HYPOCHROMIA BLD QL SMEAR: ABNORMAL
IMM GRANULOCYTES # BLD AUTO: 0.03 K/UL (ref 0–0.04)
IMM GRANULOCYTES NFR BLD AUTO: 0.5 % (ref 0–0.5)
LYMPHOCYTES # BLD AUTO: 0.9 K/UL (ref 1–4.8)
LYMPHOCYTES NFR BLD: 16.3 % (ref 18–48)
MAGNESIUM SERPL-MCNC: 1.8 MG/DL (ref 1.6–2.6)
MCH RBC QN AUTO: 18.4 PG (ref 27–31)
MCHC RBC AUTO-ENTMCNC: 30.1 G/DL (ref 32–36)
MCV RBC AUTO: 61 FL (ref 82–98)
MONOCYTES # BLD AUTO: 0.5 K/UL (ref 0.3–1)
MONOCYTES NFR BLD: 8.5 % (ref 4–15)
NEUTROPHILS # BLD AUTO: 4 K/UL (ref 1.8–7.7)
NEUTROPHILS NFR BLD: 72.8 % (ref 38–73)
NRBC BLD-RTO: 1 /100 WBC
OVALOCYTES BLD QL SMEAR: ABNORMAL
PHOSPHATE SERPL-MCNC: 4.4 MG/DL (ref 2.7–4.5)
PLATELET # BLD AUTO: 180 K/UL (ref 150–450)
PLATELET BLD QL SMEAR: ABNORMAL
PMV BLD AUTO: ABNORMAL FL (ref 9.2–12.9)
POIKILOCYTOSIS BLD QL SMEAR: ABNORMAL
POLYCHROMASIA BLD QL SMEAR: ABNORMAL
POTASSIUM SERPL-SCNC: 4.1 MMOL/L (ref 3.5–5.1)
PROT SERPL-MCNC: 6.6 G/DL (ref 6–8.4)
RBC # BLD AUTO: 5.75 M/UL (ref 4–5.4)
SCHISTOCYTES BLD QL SMEAR: ABNORMAL
SCHISTOCYTES BLD QL SMEAR: PRESENT
SODIUM SERPL-SCNC: 139 MMOL/L (ref 136–145)
SPHEROCYTES BLD QL SMEAR: ABNORMAL
STOMATOCYTES BLD QL SMEAR: PRESENT
TARGETS BLD QL SMEAR: ABNORMAL
WBC # BLD AUTO: 5.52 K/UL (ref 3.9–12.7)

## 2024-04-08 PROCEDURE — 85025 COMPLETE CBC W/AUTO DIFF WBC: CPT | Performed by: INTERNAL MEDICINE

## 2024-04-08 PROCEDURE — 81514 NFCT DS BV&VAGINITIS DNA ALG: CPT | Performed by: OBSTETRICS & GYNECOLOGY

## 2024-04-08 PROCEDURE — 36415 COLL VENOUS BLD VENIPUNCTURE: CPT

## 2024-04-08 PROCEDURE — 84100 ASSAY OF PHOSPHORUS: CPT | Performed by: INTERNAL MEDICINE

## 2024-04-08 PROCEDURE — 99213 OFFICE O/P EST LOW 20 MIN: CPT | Mod: S$PBB,,, | Performed by: OBSTETRICS & GYNECOLOGY

## 2024-04-08 PROCEDURE — 83735 ASSAY OF MAGNESIUM: CPT | Performed by: INTERNAL MEDICINE

## 2024-04-08 PROCEDURE — 4010F ACE/ARB THERAPY RXD/TAKEN: CPT | Mod: CPTII,,, | Performed by: OBSTETRICS & GYNECOLOGY

## 2024-04-08 PROCEDURE — 3075F SYST BP GE 130 - 139MM HG: CPT | Mod: CPTII,,, | Performed by: OBSTETRICS & GYNECOLOGY

## 2024-04-08 PROCEDURE — 3078F DIAST BP <80 MM HG: CPT | Mod: CPTII,,, | Performed by: OBSTETRICS & GYNECOLOGY

## 2024-04-08 PROCEDURE — 3008F BODY MASS INDEX DOCD: CPT | Mod: CPTII,,, | Performed by: OBSTETRICS & GYNECOLOGY

## 2024-04-08 PROCEDURE — 99215 OFFICE O/P EST HI 40 MIN: CPT | Mod: PBBFAC | Performed by: OBSTETRICS & GYNECOLOGY

## 2024-04-08 PROCEDURE — 99999 PR PBB SHADOW E&M-EST. PATIENT-LVL V: CPT | Mod: PBBFAC,,, | Performed by: OBSTETRICS & GYNECOLOGY

## 2024-04-08 PROCEDURE — 1159F MED LIST DOCD IN RCRD: CPT | Mod: CPTII,,, | Performed by: OBSTETRICS & GYNECOLOGY

## 2024-04-08 PROCEDURE — 1160F RVW MEDS BY RX/DR IN RCRD: CPT | Mod: CPTII,,, | Performed by: OBSTETRICS & GYNECOLOGY

## 2024-04-08 PROCEDURE — 80053 COMPREHEN METABOLIC PANEL: CPT | Performed by: INTERNAL MEDICINE

## 2024-04-08 PROCEDURE — 83880 ASSAY OF NATRIURETIC PEPTIDE: CPT | Performed by: INTERNAL MEDICINE

## 2024-04-08 RX ORDER — METRONIDAZOLE 500 MG/1
500 TABLET ORAL EVERY 12 HOURS
Qty: 28 TABLET | Refills: 0 | Status: SHIPPED | OUTPATIENT
Start: 2024-04-08 | End: 2024-04-26

## 2024-04-08 NOTE — PROGRESS NOTES
Subjective:       Patient ID: Hafsa Hawley is a 58 y.o. female.    Chief Complaint:  vaginal bump (Pt states she has what looks like a boil in her vaginal area that is very painful and itching. She states it is causing pain down her legs)      History of Present Illness  Patient presents with a bump vaginally which has been there about 3 weeks.  Patient states she is tried to express but has gotten very little out of it.  Patient has recently had Trichomonas culture which was positive.  Patient has been treated but her partner has not been treated.  Counseling was done I will re-culture today and treat for both patient and partner.    Menstrual History:  OB History          2    Para   2    Term   2            AB        Living             SAB        IAB        Ectopic        Multiple        Live Births                    Menarche age:  Patient's last menstrual period was 10/23/2001.         Review of Systems  Review of Systems   Constitutional:  Positive for appetite change, chills, diaphoresis, fatigue, fever and unexpected weight change. Negative for activity change.   HENT:  Positive for congestion and sore throat. Negative for dental problem, drooling, ear discharge, ear pain, facial swelling, hearing loss, mouth sores, nosebleeds, postnasal drip, rhinorrhea, sinus pressure, sneezing, tinnitus, trouble swallowing and voice change.    Eyes:  Positive for photophobia, pain, discharge, redness, itching and visual disturbance.   Respiratory:  Positive for apnea, cough, choking, chest tightness, shortness of breath and wheezing. Negative for stridor.    Cardiovascular:  Positive for chest pain. Negative for palpitations and leg swelling.   Gastrointestinal:  Positive for abdominal pain, constipation and diarrhea. Negative for abdominal distention, anal bleeding, blood in stool, nausea, rectal pain and vomiting.   Endocrine: Negative for cold intolerance, heat intolerance, polydipsia, polyphagia and  polyuria.   Genitourinary:  Negative for decreased urine volume, difficulty urinating, dyspareunia, dysuria, enuresis, flank pain, frequency, genital sores, hematuria, menstrual problem, pelvic pain, urgency, vaginal bleeding, vaginal discharge and vaginal pain.   Musculoskeletal:  Negative for arthralgias, back pain, gait problem, joint swelling, myalgias, neck pain and neck stiffness.   Skin:  Negative for color change, pallor, rash and wound.   Allergic/Immunologic: Negative for environmental allergies, food allergies and immunocompromised state.   Neurological:  Negative for dizziness, tremors, seizures, syncope, facial asymmetry, speech difficulty, weakness, light-headedness, numbness and headaches.   Hematological:  Negative for adenopathy. Does not bruise/bleed easily.   Psychiatric/Behavioral:  Negative for agitation, behavioral problems, confusion, decreased concentration, dysphoric mood, hallucinations, self-injury, sleep disturbance and suicidal ideas. The patient is not nervous/anxious and is not hyperactive.          Objective:      Physical Exam  Vitals and nursing note reviewed. Exam conducted with a chaperone present.   Genitourinary:     General: Normal vulva.             Assessment:        1. Vaginal discharge       Healing carbuncle         Plan:         Hafsa was seen today for vaginal bump.    Diagnoses and all orders for this visit:    Vaginal discharge    Other orders  -     metroNIDAZOLE (FLAGYL) 500 MG tablet; Take 1 tablet (500 mg total) by mouth every 12 (twelve) hours. for 7 days

## 2024-04-08 NOTE — PROGRESS NOTES
Right upper arm PICC line dressing change done using sterile technique.  No signs of infection noted.  Biopatch, stat lock device and tegaderm dressing reapplied.  Tolerated well

## 2024-04-10 LAB
BACTERIAL VAGINOSIS DNA: POSITIVE
CANDIDA GLABRATA DNA: NEGATIVE
CANDIDA KRUSEI DNA: NEGATIVE
CANDIDA RRNA VAG QL PROBE: NEGATIVE
T VAGINALIS RRNA GENITAL QL PROBE: NEGATIVE

## 2024-04-16 ENCOUNTER — TELEPHONE (OUTPATIENT)
Dept: INTERNAL MEDICINE | Facility: CLINIC | Age: 59
End: 2024-04-16
Payer: MEDICAID

## 2024-04-16 NOTE — TELEPHONE ENCOUNTER
----- Message from Gustavo Allen sent at 4/15/2024  4:11 PM CDT -----  Contact: SELF  Hafsa Hawley  MRN: [id number]  : 1965  PCP: Cristobal Ann  Home Phone      [phone]  Work Phone      Not on file.  Mobile          [phone]      MESSAGE:   Patient was discharged Friday, and needs a 2-3 days hospital follow up. Please advise      [phone]

## 2024-04-17 ENCOUNTER — INFUSION (OUTPATIENT)
Dept: INFUSION THERAPY | Facility: HOSPITAL | Age: 59
End: 2024-04-17
Attending: INTERNAL MEDICINE
Payer: MEDICAID

## 2024-04-17 VITALS
SYSTOLIC BLOOD PRESSURE: 145 MMHG | TEMPERATURE: 98 F | RESPIRATION RATE: 22 BRPM | DIASTOLIC BLOOD PRESSURE: 82 MMHG | HEART RATE: 95 BPM

## 2024-04-17 DIAGNOSIS — I42.9 CARDIOMYOPATHY, UNSPECIFIED TYPE: Primary | ICD-10-CM

## 2024-04-17 PROCEDURE — 36591 DRAW BLOOD OFF VENOUS DEVICE: CPT

## 2024-04-17 RX ORDER — PREDNISONE 5 MG/1
5 TABLET ORAL DAILY
Qty: 30 TABLET | Refills: 3 | Status: ON HOLD | OUTPATIENT
Start: 2024-04-17 | End: 2024-05-08 | Stop reason: HOSPADM

## 2024-04-17 RX ORDER — IPRATROPIUM BROMIDE AND ALBUTEROL SULFATE 2.5; .5 MG/3ML; MG/3ML
3 SOLUTION RESPIRATORY (INHALATION) EVERY 6 HOURS PRN
Qty: 90 ML | Refills: 2 | Status: SHIPPED | OUTPATIENT
Start: 2024-04-17 | End: 2024-04-18 | Stop reason: SDUPTHER

## 2024-04-17 NOTE — TELEPHONE ENCOUNTER
----- Message from Frank Cook sent at 2024 10:48 AM CDT -----  Contact: Patient  Hafsa Hawley  MRN: [id number]  : 1965  PCP: Cristobal Ann  Home Phone      [phone]  Work Phone      Not on file.  Mobile          [phone]      MESSAGE:   Pt requesting refill or new Rx.   Is this a refill or new RX:  refills  RX name and strength: Duo Neb                                        Albuterol inhaler  Last office visit: 3/06/24  Is this a 30-day or 90-day RX:  ???  Pharmacy name and location:  [facility]  Comments: meds needed today if possible     Phone:  [phone]    PCP: Seth

## 2024-04-17 NOTE — PROGRESS NOTES
Right upper arm PICC line dressing change done using sterile technique  No signs of infection noted.  Stat lock device, bio patch and tegaderm dressing applied   Tolerated well

## 2024-04-17 NOTE — TELEPHONE ENCOUNTER
No care due was identified.  [facility] Embedded Care Due Messages. Reference number: [id number].   4/17/2024 3:30:39 PM CDT

## 2024-04-18 ENCOUNTER — OFFICE VISIT (OUTPATIENT)
Dept: FAMILY MEDICINE | Facility: CLINIC | Age: 59
End: 2024-04-18
Payer: MEDICAID

## 2024-04-18 VITALS
WEIGHT: 178.25 LBS | HEART RATE: 49 BPM | SYSTOLIC BLOOD PRESSURE: 112 MMHG | OXYGEN SATURATION: 96 % | RESPIRATION RATE: 16 BRPM | BODY MASS INDEX: 28.65 KG/M2 | HEIGHT: 66 IN | DIASTOLIC BLOOD PRESSURE: 78 MMHG

## 2024-04-18 DIAGNOSIS — N18.4 CKD (CHRONIC KIDNEY DISEASE), STAGE IV: Chronic | ICD-10-CM

## 2024-04-18 DIAGNOSIS — R05.3 REFRACTORY CHRONIC COUGH: ICD-10-CM

## 2024-04-18 DIAGNOSIS — Z09 HOSPITAL DISCHARGE FOLLOW-UP: Primary | ICD-10-CM

## 2024-04-18 DIAGNOSIS — E78.2 MIXED HYPERLIPIDEMIA: ICD-10-CM

## 2024-04-18 DIAGNOSIS — I50.42 CHRONIC COMBINED SYSTOLIC AND DIASTOLIC HEART FAILURE: Chronic | ICD-10-CM

## 2024-04-18 DIAGNOSIS — I42.8 NICM (NONISCHEMIC CARDIOMYOPATHY): Chronic | ICD-10-CM

## 2024-04-18 PROCEDURE — 4010F ACE/ARB THERAPY RXD/TAKEN: CPT | Mod: CPTII,,, | Performed by: STUDENT IN AN ORGANIZED HEALTH CARE EDUCATION/TRAINING PROGRAM

## 2024-04-18 PROCEDURE — 1159F MED LIST DOCD IN RCRD: CPT | Mod: CPTII,,, | Performed by: STUDENT IN AN ORGANIZED HEALTH CARE EDUCATION/TRAINING PROGRAM

## 2024-04-18 PROCEDURE — 99999 PR PBB SHADOW E&M-EST. PATIENT-LVL V: CPT | Mod: PBBFAC,,, | Performed by: STUDENT IN AN ORGANIZED HEALTH CARE EDUCATION/TRAINING PROGRAM

## 2024-04-18 PROCEDURE — 99215 OFFICE O/P EST HI 40 MIN: CPT | Mod: PBBFAC | Performed by: STUDENT IN AN ORGANIZED HEALTH CARE EDUCATION/TRAINING PROGRAM

## 2024-04-18 PROCEDURE — 99214 OFFICE O/P EST MOD 30 MIN: CPT | Mod: S$PBB,,, | Performed by: STUDENT IN AN ORGANIZED HEALTH CARE EDUCATION/TRAINING PROGRAM

## 2024-04-18 PROCEDURE — 3078F DIAST BP <80 MM HG: CPT | Mod: CPTII,,, | Performed by: STUDENT IN AN ORGANIZED HEALTH CARE EDUCATION/TRAINING PROGRAM

## 2024-04-18 PROCEDURE — 3074F SYST BP LT 130 MM HG: CPT | Mod: CPTII,,, | Performed by: STUDENT IN AN ORGANIZED HEALTH CARE EDUCATION/TRAINING PROGRAM

## 2024-04-18 RX ORDER — ATORVASTATIN CALCIUM 40 MG/1
40 TABLET, FILM COATED ORAL NIGHTLY
Qty: 90 TABLET | Refills: 3 | Status: SHIPPED | OUTPATIENT
Start: 2024-04-18

## 2024-04-18 RX ORDER — IPRATROPIUM BROMIDE AND ALBUTEROL SULFATE 2.5; .5 MG/3ML; MG/3ML
3 SOLUTION RESPIRATORY (INHALATION) EVERY 6 HOURS PRN
Qty: 90 ML | Refills: 2 | Status: SHIPPED | OUTPATIENT
Start: 2024-04-18

## 2024-04-18 RX ORDER — FLUTICASONE PROPIONATE 50 MCG
2 SPRAY, SUSPENSION (ML) NASAL DAILY
Qty: 16 G | Refills: 12 | Status: SHIPPED | OUTPATIENT
Start: 2024-04-18

## 2024-04-18 RX ORDER — PROMETHAZINE HYDROCHLORIDE AND CODEINE PHOSPHATE 6.25; 1 MG/5ML; MG/5ML
5 SOLUTION ORAL EVERY 8 HOURS PRN
Qty: 118 ML | Refills: 0 | Status: SHIPPED | OUTPATIENT
Start: 2024-04-18 | End: 2024-04-28

## 2024-04-18 NOTE — PROGRESS NOTES
Subjective:       Patient ID: Hafsa Hawley is a 58 y.o. female.    Chief Complaint: Hospital Follow Up (Patient was seen in the ER on 04/08/2024 for chest pain, shortness of breath and fatigue.)    Pt here for hospital follow-up. States she was admitted 4/8/24-4/12/24. She had hypoxia and CHF exacerbation. She reports she is back to her baseline since discharge from hospital. Pt states she was restarted on amiodarone and lower her dobutamine.     Transitional Care Note    Family and/or Caretaker present at visit?  No.  Diagnostic tests reviewed/disposition: I have not reviewed all completed as well as pending diagnostic tests at the time of discharge as I have not received the outside hospital records  Disease/illness education: CHF  Home health/community services discussion/referrals: Patient does not have home health established from hospital visit.  They do not need home health.  If needed, we will set up home health for the patient.   Establishment or re-establishment of referral orders for community resources: No other necessary community resources.   Discussion with other health care providers: No discussion with other health care providers necessary.     She is requesting refills on meds including promethazine cough syrup.    Review of Systems   Constitutional:  Positive for activity change. Negative for chills and fever.   HENT:  Negative for congestion, rhinorrhea and sore throat.    Respiratory:  Positive for shortness of breath. Negative for cough.    Cardiovascular:  Negative for chest pain and palpitations.   Gastrointestinal:  Positive for diarrhea (chronic) and nausea (chronic). Negative for abdominal pain and vomiting.   Genitourinary:  Negative for dysuria and hematuria.   Musculoskeletal:  Positive for arthralgias.   Skin:  Negative for rash.       Objective:      Physical Exam  Vitals reviewed.   HENT:      Head: Normocephalic and atraumatic.   Eyes:      Conjunctiva/sclera: Conjunctivae  normal.   Cardiovascular:      Rate and Rhythm: Normal rate and regular rhythm.      Heart sounds: Normal heart sounds. No murmur heard.  Pulmonary:      Effort: Pulmonary effort is normal. No respiratory distress.      Breath sounds: Normal breath sounds.   Musculoskeletal:         General: No deformity.      Right lower leg: No edema.      Left lower leg: No edema.   Neurological:      Mental Status: She is alert and oriented to person, place, and time.   Psychiatric:         Mood and Affect: Mood normal.         Behavior: Behavior normal.         Assessment:       1. Hospital discharge follow-up    2. NICM (nonischemic cardiomyopathy)    3. Chronic combined systolic and diastolic heart failure    4. CKD (chronic kidney disease), stage IV    5. Mixed hyperlipidemia    6. Refractory chronic cough        Plan:           1. Hospital discharge follow-up    2. NICM (nonischemic cardiomyopathy)  -     Ambulatory referral/consult to Outpatient Case Management    3. Chronic combined systolic and diastolic heart failure  Overview:  Followed by cardiologist Dr. Benson Cortez, [facility]    Listed for transplant: No and no longer under consideration for advanced options    Orders:  -     Ambulatory referral/consult to Outpatient Case Management    4. CKD (chronic kidney disease), stage IV  -     Ambulatory referral/consult to Outpatient Case Management    5. Mixed hyperlipidemia  -     atorvastatin (LIPITOR) 40 MG tablet; Take 1 tablet (40 mg total) by mouth every evening.  Dispense: 90 tablet; Refill: 3    6. Refractory chronic cough  -     promethazine-codeine 6.25-10 mg/5 ml (PHENERGAN WITH CODEINE) 6.25-10 mg/5 mL syrup; Take 5 mLs by mouth every 8 (eight) hours as needed for Cough.  Dispense: 118 mL; Refill: 0    Other orders  -     fluticasone propionate (FLONASE ALLERGY RELIEF) 50 mcg/actuation nasal spray; 2 sprays (100 mcg total) by Each Nostril route once daily.  Dispense: 16 g; Refill: 12  -     albuterol-ipratropium  (DUO-NEB) 2.5 mg-0.5 mg/3 mL nebulizer solution; Take 3 mLs by nebulization every 6 (six) hours as needed for Wheezing or Shortness of Breath.  Dispense: 90 mL; Refill: 2       Cont current meds, refills sent  Sent promethazine cough suppressant although at this point, this is mostly for symptom control/palliative care for her cough likely due to end-stage heart failure  Follow-up cardiology as scheduled  Follow-up care at home  Follow-up palliative care  Outpatient case management referral placed  RTC for worsening symptoms or failure to improve, or RTC PRN/as scheduled

## 2024-04-22 ENCOUNTER — CLINICAL SUPPORT (OUTPATIENT)
Dept: CARDIOLOGY | Facility: HOSPITAL | Age: 59
End: 2024-04-22
Attending: INTERNAL MEDICINE
Payer: MEDICAID

## 2024-04-22 ENCOUNTER — CLINICAL SUPPORT (OUTPATIENT)
Dept: CARDIOLOGY | Facility: HOSPITAL | Age: 59
End: 2024-04-22
Payer: MEDICAID

## 2024-04-22 ENCOUNTER — INFUSION (OUTPATIENT)
Dept: INFUSION THERAPY | Facility: HOSPITAL | Age: 59
End: 2024-04-22
Attending: INTERNAL MEDICINE
Payer: MEDICAID

## 2024-04-22 VITALS — TEMPERATURE: 97 F | RESPIRATION RATE: 22 BRPM | HEART RATE: 87 BPM

## 2024-04-22 DIAGNOSIS — Z95.810 PRESENCE OF AUTOMATIC (IMPLANTABLE) CARDIAC DEFIBRILLATOR: ICD-10-CM

## 2024-04-22 DIAGNOSIS — I42.9 CARDIOMYOPATHY, UNSPECIFIED TYPE: Primary | ICD-10-CM

## 2024-04-22 LAB
ALBUMIN SERPL BCP-MCNC: 4.2 G/DL (ref 3.5–5.2)
ALP SERPL-CCNC: 49 U/L (ref 55–135)
ALT SERPL W/O P-5'-P-CCNC: 11 U/L (ref 10–44)
ANION GAP SERPL CALC-SCNC: 15 MMOL/L (ref 8–16)
ANISOCYTOSIS BLD QL SMEAR: ABNORMAL
AST SERPL-CCNC: 27 U/L (ref 10–40)
BASOPHILS # BLD AUTO: 0.04 K/UL (ref 0–0.2)
BASOPHILS NFR BLD: 0.7 % (ref 0–1.9)
BILIRUB SERPL-MCNC: 2 MG/DL (ref 0.1–1)
BNP SERPL-MCNC: 847 PG/ML (ref 0–99)
BUN SERPL-MCNC: 96 MG/DL (ref 6–20)
CALCIUM SERPL-MCNC: 9.8 MG/DL (ref 8.7–10.5)
CHLORIDE SERPL-SCNC: 98 MMOL/L (ref 95–110)
CO2 SERPL-SCNC: 27 MMOL/L (ref 23–29)
CREAT SERPL-MCNC: 3.2 MG/DL (ref 0.5–1.4)
DACRYOCYTES BLD QL SMEAR: ABNORMAL
DIFFERENTIAL METHOD BLD: ABNORMAL
EOSINOPHIL # BLD AUTO: 0.1 K/UL (ref 0–0.5)
EOSINOPHIL NFR BLD: 1.6 % (ref 0–8)
ERYTHROCYTE [DISTWIDTH] IN BLOOD BY AUTOMATED COUNT: 30.5 % (ref 11.5–14.5)
EST. GFR  (NO RACE VARIABLE): 16 ML/MIN/1.73 M^2
GIANT PLATELETS BLD QL SMEAR: PRESENT
GLUCOSE SERPL-MCNC: 205 MG/DL (ref 70–110)
HCT VFR BLD AUTO: 38.1 % (ref 37–48.5)
HGB BLD-MCNC: 11.5 G/DL (ref 12–16)
HOWELL-JOLLY BOD BLD QL SMEAR: ABNORMAL
HYPOCHROMIA BLD QL SMEAR: ABNORMAL
IMM GRANULOCYTES # BLD AUTO: 0.03 K/UL (ref 0–0.04)
IMM GRANULOCYTES NFR BLD AUTO: 0.5 % (ref 0–0.5)
LYMPHOCYTES # BLD AUTO: 0.7 K/UL (ref 1–4.8)
LYMPHOCYTES NFR BLD: 13.1 % (ref 18–48)
MAGNESIUM SERPL-MCNC: 2.1 MG/DL (ref 1.6–2.6)
MCH RBC QN AUTO: 19 PG (ref 27–31)
MCHC RBC AUTO-ENTMCNC: 30.2 G/DL (ref 32–36)
MCV RBC AUTO: 63 FL (ref 82–98)
MONOCYTES # BLD AUTO: 0.5 K/UL (ref 0.3–1)
MONOCYTES NFR BLD: 8 % (ref 4–15)
NEUTROPHILS # BLD AUTO: 4.3 K/UL (ref 1.8–7.7)
NEUTROPHILS NFR BLD: 76.1 % (ref 38–73)
NRBC BLD-RTO: 1 /100 WBC
OVALOCYTES BLD QL SMEAR: ABNORMAL
PHOSPHATE SERPL-MCNC: 5.2 MG/DL (ref 2.7–4.5)
PLATELET # BLD AUTO: 185 K/UL (ref 150–450)
PLATELET BLD QL SMEAR: ABNORMAL
PMV BLD AUTO: ABNORMAL FL (ref 9.2–12.9)
POIKILOCYTOSIS BLD QL SMEAR: ABNORMAL
POLYCHROMASIA BLD QL SMEAR: ABNORMAL
POTASSIUM SERPL-SCNC: 4.4 MMOL/L (ref 3.5–5.1)
PROT SERPL-MCNC: 7.3 G/DL (ref 6–8.4)
RBC # BLD AUTO: 6.06 M/UL (ref 4–5.4)
SCHISTOCYTES BLD QL SMEAR: ABNORMAL
SCHISTOCYTES BLD QL SMEAR: PRESENT
SODIUM SERPL-SCNC: 140 MMOL/L (ref 136–145)
SPHEROCYTES BLD QL SMEAR: ABNORMAL
STOMATOCYTES BLD QL SMEAR: PRESENT
TARGETS BLD QL SMEAR: ABNORMAL
WBC # BLD AUTO: 5.66 K/UL (ref 3.9–12.7)
WBC TOXIC VACUOLES BLD QL SMEAR: PRESENT

## 2024-04-22 PROCEDURE — 83880 ASSAY OF NATRIURETIC PEPTIDE: CPT | Performed by: INTERNAL MEDICINE

## 2024-04-22 PROCEDURE — 93295 DEV INTERROG REMOTE 1/2/MLT: CPT | Mod: ,,, | Performed by: INTERNAL MEDICINE

## 2024-04-22 PROCEDURE — 36415 COLL VENOUS BLD VENIPUNCTURE: CPT

## 2024-04-22 PROCEDURE — 84100 ASSAY OF PHOSPHORUS: CPT | Performed by: INTERNAL MEDICINE

## 2024-04-22 PROCEDURE — 85025 COMPLETE CBC W/AUTO DIFF WBC: CPT | Performed by: INTERNAL MEDICINE

## 2024-04-22 PROCEDURE — 93296 REM INTERROG EVL PM/IDS: CPT | Performed by: INTERNAL MEDICINE

## 2024-04-22 PROCEDURE — 80053 COMPREHEN METABOLIC PANEL: CPT | Performed by: INTERNAL MEDICINE

## 2024-04-22 PROCEDURE — 83735 ASSAY OF MAGNESIUM: CPT | Performed by: INTERNAL MEDICINE

## 2024-04-22 NOTE — PROGRESS NOTES
Right upper arm PICC line dressing change done using sterile technique.  No signs of infection noted  Bio patch, stat lock device, and tegaderm dressing reapplied.  Tolerated well  dc to home in stable condition

## 2024-04-23 ENCOUNTER — PATIENT OUTREACH (OUTPATIENT)
Dept: ADMINISTRATIVE | Facility: OTHER | Age: 59
End: 2024-04-23
Payer: MEDICAID

## 2024-04-23 ENCOUNTER — PATIENT MESSAGE (OUTPATIENT)
Dept: ADMINISTRATIVE | Facility: OTHER | Age: 59
End: 2024-04-23
Payer: MEDICAID

## 2024-04-23 NOTE — PATIENT INSTRUCTIONS
If you have any questions call Elaine [phone].         [facility] [employer] [facility]  [address] [place] [address]  [phone]    [facility]  [address] [place] [address]    [facility]  [address] [place] [address]    [facility]  [address]   [place] [address]  [phone]    [facility]  [address] [place] [address]  [phone]    [facility]  [address]  [place] [address]  [phone]        Utility Resources     [facility] [employer]   [phone]    [facility]  [phone]    [facility] [place] [facility]  [phone]

## 2024-04-23 NOTE — PROGRESS NOTES
CHW - Initial Contact    This LPN completed the Social Determinant of Health questionnaire with patient via telephone today.    Pt identified barriers of most importance are: Utility assistance and Food   Referrals to community agencies completed with patient/caregiver consent outside of [facility] include:  No  Referrals were put through [facility] - no:   Support and Services: Completed SDOH questionnaire, Sent utility and food bank resources   Other information discussed the patient needs / wants help with: None   Follow up required: Yes  Follow-up Outreach - Due: 5/21/2024          LPN spoke to patient/caregiver as per [facility] referral for: Prescription Assistance/Medication, Mental Health      Does the patient consent to completing the assessment/enrollment: Yes  Does the patient consent for LPN to speak to a caregiver? No    Health Insurance Coverage:     Does the patient have adequate health insurance coverage? Yes  Education provided: No    PCP Follow-Up Appointments:    Does the patient have a primary care provider? yes - Dr. Cristobal Ann  Date of last PCP appointment? 4/18/24  Next PCP appointment:  6/26/24 5310  Was patient provided with education surrounding PCP services/creating a medical home? yes - Educated on the benefits of having a PCP.  Educated on the use of urgent care clinic for non emergent issues when PCP unavailable.  Patient verbalized understanding.    Specialist Appointments:     Does the patient have a pending specialist referral? no  Does the patient have an upcoming specialist appointment? yes - Pulmonology 4/25/24 1300, rheumatology 5/6/24 1000, Endocrinology 5/8/24 1400, Podiatry 5/28/24 1415  Is the patient pregnant? No  Does the patient have a mental health provider? yes - Patient states she speaks with her , palliative care and is going to set up an appointment with a mental health clinic near her.  She denies SI/HI.  Instructed if she develops SI/HI to present to the ED for  evaluation, educated on 988.  Patient verbalized understanding.       Home Medications:     Reviewed medication list with patient? No  Is the patient able to afford their medications? Yes  Patient states she wanted to know why the co-pay for her medications changed.  Explained to patient that the insurance probably changed the way they pay for the medications.  She states she was unable to pay for her Prevacid at the time the prescription was filled because she did not have the money at the time.  She states she does not need assistance paying for medications.  Offered to refer to Pharmacy Patient Assistance Team, patient declined.    Care Gaps:     Does the patient have any care gaps that are due? Yes  Care Gaps     Overdue          Never done Diabetes Urine Screening (Yearly)   Last ordered: Mar 6, 2024     Never done Foot Exam (Yearly)   Last ordered: Feb 22, 2024     Never done Sign Pain Contract (Once)     Never done Complete Opioid Risk Tool (Once)     JAN 7 2023 Mammogram (Yearly)   Scheduled for: Apr 23, 2024     SEP 1  2023 COVID-19 Vaccine (3 - 2023-24 season)  Last completed: Apr 29, 2021 JAN 4 2024 Lipid Panel (Yearly)   Last ordered: Feb 7, 2024 FEB 7 2024 Hemoglobin A1c (Every 6 Months)   Last ordered: Mar 6, 2024         Recent lab results:  Blood Sugar: 215   Provided education: Yes, educated on a diabetic low salt diet.  Patient states she takes Ozempic 1 mg weekly and Jardiance 25 mg daily and is taking her medications as prescribed.  Instructed to continue taking her medications as prescribed.  Blood Pressure: 112/74 P 87   Provided education: Yes        Social Determinants of Health (SDOH)    Patient's identified areas of need:  Utility assistance and food  Education/Resources provided:   Yes       Home Health/DME:    Current Home Health: No  Patient has all healthcare equipment/supplies indicated: yes  Current DME: rolling walker, shower chair, and BP cuff, glucometer, oxygen,  nebulizer, cane.  Patient states she does not have a CPAP machine and she needs to make an appointment for a sleep study.      Additional Documentation:   Identity verified.        Follow up:   Patient agrees to scheduled follow up call.

## 2024-04-29 ENCOUNTER — INFUSION (OUTPATIENT)
Dept: INFUSION THERAPY | Facility: HOSPITAL | Age: 59
End: 2024-04-29
Attending: INTERNAL MEDICINE
Payer: MEDICAID

## 2024-04-29 DIAGNOSIS — I42.9 CARDIOMYOPATHY, UNSPECIFIED TYPE: Primary | ICD-10-CM

## 2024-04-29 DIAGNOSIS — R05.3 REFRACTORY CHRONIC COUGH: ICD-10-CM

## 2024-04-29 LAB
ALBUMIN SERPL BCP-MCNC: 4 G/DL (ref 3.5–5.2)
ALP SERPL-CCNC: 43 U/L (ref 55–135)
ALT SERPL W/O P-5'-P-CCNC: 12 U/L (ref 10–44)
ANION GAP SERPL CALC-SCNC: 15 MMOL/L (ref 8–16)
ANISOCYTOSIS BLD QL SMEAR: ABNORMAL
AST SERPL-CCNC: 20 U/L (ref 10–40)
BASOPHILS # BLD AUTO: 0.04 K/UL (ref 0–0.2)
BASOPHILS NFR BLD: 0.8 % (ref 0–1.9)
BILIRUB SERPL-MCNC: 1.9 MG/DL (ref 0.1–1)
BNP SERPL-MCNC: 329 PG/ML (ref 0–99)
BUN SERPL-MCNC: 120 MG/DL (ref 6–20)
CALCIUM SERPL-MCNC: 9.1 MG/DL (ref 8.7–10.5)
CHLORIDE SERPL-SCNC: 100 MMOL/L (ref 95–110)
CO2 SERPL-SCNC: 22 MMOL/L (ref 23–29)
CREAT SERPL-MCNC: 3.7 MG/DL (ref 0.5–1.4)
DACRYOCYTES BLD QL SMEAR: ABNORMAL
DIFFERENTIAL METHOD BLD: ABNORMAL
EOSINOPHIL # BLD AUTO: 0.1 K/UL (ref 0–0.5)
EOSINOPHIL NFR BLD: 2.4 % (ref 0–8)
ERYTHROCYTE [DISTWIDTH] IN BLOOD BY AUTOMATED COUNT: 29.4 % (ref 11.5–14.5)
EST. GFR  (NO RACE VARIABLE): 14 ML/MIN/1.73 M^2
GLUCOSE SERPL-MCNC: 135 MG/DL (ref 70–110)
HCT VFR BLD AUTO: 38.4 % (ref 37–48.5)
HGB BLD-MCNC: 11.3 G/DL (ref 12–16)
HYPOCHROMIA BLD QL SMEAR: ABNORMAL
IMM GRANULOCYTES # BLD AUTO: 0.02 K/UL (ref 0–0.04)
IMM GRANULOCYTES NFR BLD AUTO: 0.4 % (ref 0–0.5)
LYMPHOCYTES # BLD AUTO: 0.9 K/UL (ref 1–4.8)
LYMPHOCYTES NFR BLD: 18 % (ref 18–48)
MAGNESIUM SERPL-MCNC: 2 MG/DL (ref 1.6–2.6)
MCH RBC QN AUTO: 18.7 PG (ref 27–31)
MCHC RBC AUTO-ENTMCNC: 29.4 G/DL (ref 32–36)
MCV RBC AUTO: 64 FL (ref 82–98)
MONOCYTES # BLD AUTO: 0.5 K/UL (ref 0.3–1)
MONOCYTES NFR BLD: 9.2 % (ref 4–15)
NEUTROPHILS # BLD AUTO: 3.5 K/UL (ref 1.8–7.7)
NEUTROPHILS NFR BLD: 69.2 % (ref 38–73)
NRBC BLD-RTO: 0 /100 WBC
OVALOCYTES BLD QL SMEAR: ABNORMAL
PHOSPHATE SERPL-MCNC: 5.4 MG/DL (ref 2.7–4.5)
PLATELET # BLD AUTO: 149 K/UL (ref 150–450)
PLATELET BLD QL SMEAR: ABNORMAL
PMV BLD AUTO: ABNORMAL FL (ref 9.2–12.9)
POIKILOCYTOSIS BLD QL SMEAR: ABNORMAL
POTASSIUM SERPL-SCNC: 4.2 MMOL/L (ref 3.5–5.1)
PROT SERPL-MCNC: 6.8 G/DL (ref 6–8.4)
RBC # BLD AUTO: 6.04 M/UL (ref 4–5.4)
SCHISTOCYTES BLD QL SMEAR: ABNORMAL
SCHISTOCYTES BLD QL SMEAR: PRESENT
SODIUM SERPL-SCNC: 137 MMOL/L (ref 136–145)
TARGETS BLD QL SMEAR: ABNORMAL
WBC # BLD AUTO: 4.99 K/UL (ref 3.9–12.7)

## 2024-04-29 PROCEDURE — 36415 COLL VENOUS BLD VENIPUNCTURE: CPT | Performed by: INTERNAL MEDICINE

## 2024-04-29 PROCEDURE — 85025 COMPLETE CBC W/AUTO DIFF WBC: CPT | Performed by: INTERNAL MEDICINE

## 2024-04-29 PROCEDURE — 80053 COMPREHEN METABOLIC PANEL: CPT | Performed by: INTERNAL MEDICINE

## 2024-04-29 PROCEDURE — 83880 ASSAY OF NATRIURETIC PEPTIDE: CPT | Performed by: INTERNAL MEDICINE

## 2024-04-29 PROCEDURE — 36591 DRAW BLOOD OFF VENOUS DEVICE: CPT

## 2024-04-29 PROCEDURE — 84100 ASSAY OF PHOSPHORUS: CPT | Performed by: INTERNAL MEDICINE

## 2024-04-29 PROCEDURE — 83735 ASSAY OF MAGNESIUM: CPT | Performed by: INTERNAL MEDICINE

## 2024-04-29 RX ORDER — PROMETHAZINE HYDROCHLORIDE AND CODEINE PHOSPHATE 6.25; 1 MG/5ML; MG/5ML
5 SOLUTION ORAL EVERY 8 HOURS PRN
Qty: 118 ML | Refills: 0 | Status: CANCELLED | OUTPATIENT
Start: 2024-04-29 | End: 2024-05-09

## 2024-04-29 NOTE — TELEPHONE ENCOUNTER
Refill Routing Note   Medication(s) are not appropriate for processing by [facility] for the following reason(s):        Outside of protocol    [facility] action(s):  Route             Appointments  past 12m or future 3m with PCP    Date Provider   Last Visit   4/18/2024 Cristobal Ann MD   Next Visit   6/26/2024 Cristobal Ann MD   ED visits in past 90 days: 2        Note composed:5:36 PM 04/29/2024

## 2024-04-29 NOTE — TELEPHONE ENCOUNTER
----- Message from Gustavo Allen sent at 2024  4:26 PM CDT -----  Contact: self  Hafsa Hawley  MRN: [id number]  : 1965  PCP: Cristobal Ann  Home Phone      [phone]  Work Phone      Not on file.  Mobile          [phone]      MESSAGE:   Patient is asking for a refill on her cough syrup, I didn't see it in chart. Patient is also requesting a face mask and a nebulizer. Please advise      [phone]

## 2024-04-29 NOTE — PROGRESS NOTES
Right upper arm PICC line dressing change done using sterile technique.  No signs of infection noted.  Bio patch, stat lock device and tegaderm dressing reapplied.  Tolerated well

## 2024-04-30 RX ORDER — NEBULIZER AND COMPRESSOR
1 EACH MISCELLANEOUS ONCE
Qty: 1 EACH | Refills: 0 | Status: SHIPPED | OUTPATIENT
Start: 2024-04-30 | End: 2024-05-03

## 2024-05-01 DIAGNOSIS — R52 PAIN: ICD-10-CM

## 2024-05-01 RX ORDER — HYDROCODONE BITARTRATE AND ACETAMINOPHEN 10; 325 MG/1; MG/1
1 TABLET ORAL EVERY 6 HOURS PRN
Qty: 120 TABLET | Refills: 0 | Status: SHIPPED | OUTPATIENT
Start: 2024-05-01 | End: 2024-06-03 | Stop reason: SDUPTHER

## 2024-05-02 LAB
OHS CV AF BURDEN PERCENT: < 1
OHS CV DC REMOTE DEVICE TYPE: NORMAL
OHS CV RV PACING PERCENT: 0 %

## 2024-05-04 ENCOUNTER — HOSPITAL ENCOUNTER (EMERGENCY)
Facility: HOSPITAL | Age: 59
Discharge: ANOTHER HOSPITAL | End: 2024-05-05
Attending: SURGERY
Payer: MEDICAID

## 2024-05-04 DIAGNOSIS — N18.4 CKD (CHRONIC KIDNEY DISEASE) STAGE 4, GFR 15-29 ML/MIN: ICD-10-CM

## 2024-05-04 DIAGNOSIS — R53.83 FATIGUE: ICD-10-CM

## 2024-05-04 DIAGNOSIS — Z95.810 ICD (IMPLANTABLE CARDIOVERTER-DEFIBRILLATOR) IN PLACE: Chronic | ICD-10-CM

## 2024-05-04 DIAGNOSIS — I42.8 NICM (NONISCHEMIC CARDIOMYOPATHY): Chronic | ICD-10-CM

## 2024-05-04 DIAGNOSIS — J11.1 INFLUENZA: ICD-10-CM

## 2024-05-04 DIAGNOSIS — R79.89 ELEVATED TROPONIN: Primary | ICD-10-CM

## 2024-05-04 DIAGNOSIS — I50.43 ACUTE ON CHRONIC COMBINED SYSTOLIC AND DIASTOLIC HEART FAILURE: ICD-10-CM

## 2024-05-04 DIAGNOSIS — R06.02 SHORTNESS OF BREATH: ICD-10-CM

## 2024-05-04 DIAGNOSIS — R73.9 HYPERGLYCEMIA: ICD-10-CM

## 2024-05-04 DIAGNOSIS — E11.22 TYPE 2 DIABETES MELLITUS WITH STAGE 4 CHRONIC KIDNEY DISEASE, WITHOUT LONG-TERM CURRENT USE OF INSULIN: ICD-10-CM

## 2024-05-04 DIAGNOSIS — I27.20 PULMONARY HYPERTENSION: ICD-10-CM

## 2024-05-04 DIAGNOSIS — I27.21 PULMONARY ARTERIAL HYPERTENSION: ICD-10-CM

## 2024-05-04 DIAGNOSIS — N18.4 TYPE 2 DIABETES MELLITUS WITH STAGE 4 CHRONIC KIDNEY DISEASE, WITHOUT LONG-TERM CURRENT USE OF INSULIN: ICD-10-CM

## 2024-05-04 LAB
ALBUMIN SERPL BCP-MCNC: 4.2 G/DL (ref 3.5–5.2)
ALP SERPL-CCNC: 47 U/L (ref 55–135)
ALT SERPL W/O P-5'-P-CCNC: 17 U/L (ref 10–44)
ANION GAP SERPL CALC-SCNC: 13 MMOL/L (ref 8–16)
ANISOCYTOSIS BLD QL SMEAR: ABNORMAL
AST SERPL-CCNC: 19 U/L (ref 10–40)
BACTERIA #/AREA URNS HPF: NORMAL /HPF
BASOPHILS # BLD AUTO: 0.01 K/UL (ref 0–0.2)
BASOPHILS NFR BLD: 0.2 % (ref 0–1.9)
BILIRUB SERPL-MCNC: 1.7 MG/DL (ref 0.1–1)
BILIRUB UR QL STRIP: NEGATIVE
BNP SERPL-MCNC: 3051 PG/ML (ref 0–99)
BUN SERPL-MCNC: 69 MG/DL (ref 6–20)
CALCIUM SERPL-MCNC: 9.8 MG/DL (ref 8.7–10.5)
CHLORIDE SERPL-SCNC: 103 MMOL/L (ref 95–110)
CK SERPL-CCNC: 50 U/L (ref 20–180)
CLARITY UR: CLEAR
CO2 SERPL-SCNC: 22 MMOL/L (ref 23–29)
COLOR UR: YELLOW
CREAT SERPL-MCNC: 2.9 MG/DL (ref 0.5–1.4)
DACRYOCYTES BLD QL SMEAR: ABNORMAL
DIFFERENTIAL METHOD BLD: ABNORMAL
EOSINOPHIL # BLD AUTO: 0 K/UL (ref 0–0.5)
EOSINOPHIL NFR BLD: 0 % (ref 0–8)
ERYTHROCYTE [DISTWIDTH] IN BLOOD BY AUTOMATED COUNT: 29.2 % (ref 11.5–14.5)
EST. GFR  (NO RACE VARIABLE): 18 ML/MIN/1.73 M^2
GLUCOSE SERPL-MCNC: 345 MG/DL (ref 70–110)
GLUCOSE UR QL STRIP: ABNORMAL
GROUP A STREP, MOLECULAR: NEGATIVE
HCT VFR BLD AUTO: 35.8 % (ref 37–48.5)
HGB BLD-MCNC: 10.7 G/DL (ref 12–16)
HGB UR QL STRIP: ABNORMAL
HYALINE CASTS #/AREA URNS LPF: 0 /LPF
IMM GRANULOCYTES # BLD AUTO: 0.04 K/UL (ref 0–0.04)
IMM GRANULOCYTES NFR BLD AUTO: 0.7 % (ref 0–0.5)
INFLUENZA A, MOLECULAR: POSITIVE
INFLUENZA B, MOLECULAR: NEGATIVE
KETONES UR QL STRIP: NEGATIVE
LACTATE SERPL-SCNC: 3.3 MMOL/L (ref 0.5–2.2)
LEUKOCYTE ESTERASE UR QL STRIP: NEGATIVE
LYMPHOCYTES # BLD AUTO: 0.2 K/UL (ref 1–4.8)
LYMPHOCYTES NFR BLD: 3.9 % (ref 18–48)
MCH RBC QN AUTO: 18.9 PG (ref 27–31)
MCHC RBC AUTO-ENTMCNC: 29.9 G/DL (ref 32–36)
MCV RBC AUTO: 63 FL (ref 82–98)
MICROSCOPIC COMMENT: NORMAL
MONOCYTES # BLD AUTO: 0 K/UL (ref 0.3–1)
MONOCYTES NFR BLD: 0.7 % (ref 4–15)
NEUTROPHILS # BLD AUTO: 5.1 K/UL (ref 1.8–7.7)
NEUTROPHILS NFR BLD: 94.5 % (ref 38–73)
NITRITE UR QL STRIP: NEGATIVE
NRBC BLD-RTO: 1 /100 WBC
OVALOCYTES BLD QL SMEAR: ABNORMAL
PH UR STRIP: 6 [PH] (ref 5–8)
PLATELET # BLD AUTO: 126 K/UL (ref 150–450)
PMV BLD AUTO: ABNORMAL FL (ref 9.2–12.9)
POIKILOCYTOSIS BLD QL SMEAR: ABNORMAL
POTASSIUM SERPL-SCNC: 5 MMOL/L (ref 3.5–5.1)
PROCALCITONIN SERPL IA-MCNC: 0.19 NG/ML
PROT SERPL-MCNC: 7.2 G/DL (ref 6–8.4)
PROT UR QL STRIP: ABNORMAL
RBC # BLD AUTO: 5.67 M/UL (ref 4–5.4)
RBC #/AREA URNS HPF: 3 /HPF (ref 0–4)
SARS-COV-2 RDRP RESP QL NAA+PROBE: NEGATIVE
SODIUM SERPL-SCNC: 138 MMOL/L (ref 136–145)
SP GR UR STRIP: 1.01 (ref 1–1.03)
SPECIMEN SOURCE: ABNORMAL
SQUAMOUS #/AREA URNS HPF: NORMAL /HPF
TARGETS BLD QL SMEAR: ABNORMAL
TROPONIN I SERPL DL<=0.01 NG/ML-MCNC: 2.23 NG/ML (ref 0–0.03)
URN SPEC COLLECT METH UR: ABNORMAL
UROBILINOGEN UR STRIP-ACNC: NEGATIVE EU/DL
WBC # BLD AUTO: 5.36 K/UL (ref 3.9–12.7)
WBC #/AREA URNS HPF: 0 /HPF (ref 0–5)

## 2024-05-04 PROCEDURE — 82962 GLUCOSE BLOOD TEST: CPT

## 2024-05-04 PROCEDURE — 99291 CRITICAL CARE FIRST HOUR: CPT

## 2024-05-04 PROCEDURE — 93010 ELECTROCARDIOGRAM REPORT: CPT | Mod: 77,,, | Performed by: INTERNAL MEDICINE

## 2024-05-04 PROCEDURE — 87040 BLOOD CULTURE FOR BACTERIA: CPT | Performed by: SURGERY

## 2024-05-04 PROCEDURE — 25000003 PHARM REV CODE 250: Performed by: SURGERY

## 2024-05-04 PROCEDURE — 93010 ELECTROCARDIOGRAM REPORT: CPT | Mod: ,,, | Performed by: INTERNAL MEDICINE

## 2024-05-04 PROCEDURE — 87502 INFLUENZA DNA AMP PROBE: CPT | Performed by: SURGERY

## 2024-05-04 PROCEDURE — 82550 ASSAY OF CK (CPK): CPT | Performed by: SURGERY

## 2024-05-04 PROCEDURE — 87651 STREP A DNA AMP PROBE: CPT | Performed by: SURGERY

## 2024-05-04 PROCEDURE — 84484 ASSAY OF TROPONIN QUANT: CPT | Performed by: SURGERY

## 2024-05-04 PROCEDURE — 99900035 HC TECH TIME PER 15 MIN (STAT)

## 2024-05-04 PROCEDURE — 85025 COMPLETE CBC W/AUTO DIFF WBC: CPT | Performed by: SURGERY

## 2024-05-04 PROCEDURE — 80053 COMPREHEN METABOLIC PANEL: CPT | Performed by: SURGERY

## 2024-05-04 PROCEDURE — U0002 COVID-19 LAB TEST NON-CDC: HCPCS | Performed by: SURGERY

## 2024-05-04 PROCEDURE — 83605 ASSAY OF LACTIC ACID: CPT | Performed by: SURGERY

## 2024-05-04 PROCEDURE — 96374 THER/PROPH/DIAG INJ IV PUSH: CPT

## 2024-05-04 PROCEDURE — 63600175 PHARM REV CODE 636 W HCPCS: Performed by: SURGERY

## 2024-05-04 PROCEDURE — 83880 ASSAY OF NATRIURETIC PEPTIDE: CPT | Performed by: SURGERY

## 2024-05-04 PROCEDURE — 93005 ELECTROCARDIOGRAM TRACING: CPT

## 2024-05-04 PROCEDURE — 84145 PROCALCITONIN (PCT): CPT | Performed by: SURGERY

## 2024-05-04 PROCEDURE — 81000 URINALYSIS NONAUTO W/SCOPE: CPT | Performed by: SURGERY

## 2024-05-04 RX ORDER — GABAPENTIN 100 MG/1
100 CAPSULE ORAL
Status: COMPLETED | OUTPATIENT
Start: 2024-05-04 | End: 2024-05-04

## 2024-05-04 RX ORDER — ASPIRIN 325 MG
325 TABLET ORAL
Status: COMPLETED | OUTPATIENT
Start: 2024-05-04 | End: 2024-05-04

## 2024-05-04 RX ADMIN — INSULIN HUMAN 5 UNITS: 100 INJECTION, SOLUTION PARENTERAL at 07:05

## 2024-05-04 RX ADMIN — ASPIRIN 325 MG ORAL TABLET 325 MG: 325 PILL ORAL at 08:05

## 2024-05-04 RX ADMIN — GABAPENTIN 100 MG: 100 CAPSULE ORAL at 11:05

## 2024-05-04 NOTE — ED TRIAGE NOTES
Patient reports blood sugar was 398 mg/dl at home.  mg/dl in triage. C/o chills, nausea, and urinary frequency.

## 2024-05-05 ENCOUNTER — HOSPITAL ENCOUNTER (INPATIENT)
Facility: HOSPITAL | Age: 59
LOS: 3 days | Discharge: HOME | DRG: 291 | End: 2024-05-08
Attending: INTERNAL MEDICINE | Admitting: INTERNAL MEDICINE
Payer: MEDICAID

## 2024-05-05 VITALS
OXYGEN SATURATION: 97 % | SYSTOLIC BLOOD PRESSURE: 120 MMHG | RESPIRATION RATE: 18 BRPM | WEIGHT: 177.81 LBS | TEMPERATURE: 99 F | BODY MASS INDEX: 28.57 KG/M2 | HEART RATE: 99 BPM | HEIGHT: 66 IN | DIASTOLIC BLOOD PRESSURE: 82 MMHG

## 2024-05-05 DIAGNOSIS — I50.9 ACUTE ON CHRONIC HEART FAILURE: ICD-10-CM

## 2024-05-05 DIAGNOSIS — I50.9 DECOMPENSATED HEART FAILURE: Primary | ICD-10-CM

## 2024-05-05 DIAGNOSIS — I42.8 NICM (NONISCHEMIC CARDIOMYOPATHY): Chronic | ICD-10-CM

## 2024-05-05 DIAGNOSIS — E87.20 LACTIC ACIDOSIS: ICD-10-CM

## 2024-05-05 DIAGNOSIS — I27.21 PULMONARY ARTERIAL HYPERTENSION: ICD-10-CM

## 2024-05-05 PROBLEM — I48.0 PAROXYSMAL A-FIB: Chronic | Status: ACTIVE | Noted: 2023-06-28

## 2024-05-05 PROBLEM — J10.1 INFLUENZA A: Status: ACTIVE | Noted: 2024-05-05

## 2024-05-05 LAB
ACANTHOCYTES BLD QL SMEAR: PRESENT
ALBUMIN SERPL BCP-MCNC: 3.7 G/DL (ref 3.5–5.2)
ALBUMIN SERPL BCP-MCNC: 3.8 G/DL (ref 3.5–5.2)
ALP SERPL-CCNC: 41 U/L (ref 55–135)
ALP SERPL-CCNC: 42 U/L (ref 55–135)
ALT SERPL W/O P-5'-P-CCNC: 15 U/L (ref 10–44)
ALT SERPL W/O P-5'-P-CCNC: 17 U/L (ref 10–44)
ANION GAP SERPL CALC-SCNC: 10 MMOL/L (ref 8–16)
ANION GAP SERPL CALC-SCNC: 10 MMOL/L (ref 8–16)
ANION GAP SERPL CALC-SCNC: 12 MMOL/L (ref 8–16)
ANISOCYTOSIS BLD QL SMEAR: ABNORMAL
ANISOCYTOSIS BLD QL SMEAR: SLIGHT
AST SERPL-CCNC: 15 U/L (ref 10–40)
AST SERPL-CCNC: 16 U/L (ref 10–40)
BASO STIPL BLD QL SMEAR: ABNORMAL
BASOPHILS # BLD AUTO: 0.01 K/UL (ref 0–0.2)
BASOPHILS # BLD AUTO: 0.01 K/UL (ref 0–0.2)
BASOPHILS NFR BLD: 0.2 % (ref 0–1.9)
BASOPHILS NFR BLD: 0.2 % (ref 0–1.9)
BILIRUB SERPL-MCNC: 1.7 MG/DL (ref 0.1–1)
BILIRUB SERPL-MCNC: 1.7 MG/DL (ref 0.1–1)
BUN SERPL-MCNC: 72 MG/DL (ref 6–20)
BUN SERPL-MCNC: 72 MG/DL (ref 6–20)
BUN SERPL-MCNC: 73 MG/DL (ref 6–20)
BURR CELLS BLD QL SMEAR: ABNORMAL
BURR CELLS BLD QL SMEAR: ABNORMAL
CALCIUM SERPL-MCNC: 9.5 MG/DL (ref 8.7–10.5)
CALCIUM SERPL-MCNC: 9.6 MG/DL (ref 8.7–10.5)
CALCIUM SERPL-MCNC: 9.8 MG/DL (ref 8.7–10.5)
CHLORIDE SERPL-SCNC: 101 MMOL/L (ref 95–110)
CHLORIDE SERPL-SCNC: 103 MMOL/L (ref 95–110)
CHLORIDE SERPL-SCNC: 104 MMOL/L (ref 95–110)
CO2 SERPL-SCNC: 24 MMOL/L (ref 23–29)
CREAT SERPL-MCNC: 2.6 MG/DL (ref 0.5–1.4)
CREAT SERPL-MCNC: 2.7 MG/DL (ref 0.5–1.4)
CREAT SERPL-MCNC: 2.7 MG/DL (ref 0.5–1.4)
DACRYOCYTES BLD QL SMEAR: ABNORMAL
DIFFERENTIAL METHOD BLD: ABNORMAL
DIFFERENTIAL METHOD BLD: ABNORMAL
EOSINOPHIL # BLD AUTO: 0 K/UL (ref 0–0.5)
EOSINOPHIL # BLD AUTO: 0 K/UL (ref 0–0.5)
EOSINOPHIL NFR BLD: 0 % (ref 0–8)
EOSINOPHIL NFR BLD: 0.5 % (ref 0–8)
ERYTHROCYTE [DISTWIDTH] IN BLOOD BY AUTOMATED COUNT: 28.5 % (ref 11.5–14.5)
ERYTHROCYTE [DISTWIDTH] IN BLOOD BY AUTOMATED COUNT: 28.6 % (ref 11.5–14.5)
EST. GFR  (NO RACE VARIABLE): 19.8 ML/MIN/1.73 M^2
EST. GFR  (NO RACE VARIABLE): 19.8 ML/MIN/1.73 M^2
EST. GFR  (NO RACE VARIABLE): 20.7 ML/MIN/1.73 M^2
ESTIMATED AVG GLUCOSE: 114 MG/DL (ref 68–131)
GIANT PLATELETS BLD QL SMEAR: PRESENT
GLUCOSE SERPL-MCNC: 156 MG/DL (ref 70–110)
GLUCOSE SERPL-MCNC: 166 MG/DL (ref 70–110)
GLUCOSE SERPL-MCNC: 189 MG/DL (ref 70–110)
HBA1C MFR BLD: 5.6 % (ref 4–5.6)
HCT VFR BLD AUTO: 33 % (ref 37–48.5)
HCT VFR BLD AUTO: 33.4 % (ref 37–48.5)
HGB BLD-MCNC: 10 G/DL (ref 12–16)
HGB BLD-MCNC: 9.9 G/DL (ref 12–16)
HOWELL-JOLLY BOD BLD QL SMEAR: ABNORMAL
HYPOCHROMIA BLD QL SMEAR: ABNORMAL
HYPOCHROMIA BLD QL SMEAR: ABNORMAL
IMM GRANULOCYTES # BLD AUTO: 0.02 K/UL (ref 0–0.04)
IMM GRANULOCYTES # BLD AUTO: 0.03 K/UL (ref 0–0.04)
IMM GRANULOCYTES NFR BLD AUTO: 0.4 % (ref 0–0.5)
IMM GRANULOCYTES NFR BLD AUTO: 0.5 % (ref 0–0.5)
LACTATE SERPL-SCNC: 1 MMOL/L (ref 0.5–2.2)
LACTATE SERPL-SCNC: 1.1 MMOL/L (ref 0.5–2.2)
LYMPHOCYTES # BLD AUTO: 0.5 K/UL (ref 1–4.8)
LYMPHOCYTES # BLD AUTO: 0.7 K/UL (ref 1–4.8)
LYMPHOCYTES NFR BLD: 10.2 % (ref 18–48)
LYMPHOCYTES NFR BLD: 11.9 % (ref 18–48)
MAGNESIUM SERPL-MCNC: 1.8 MG/DL (ref 1.6–2.6)
MAGNESIUM SERPL-MCNC: 2.9 MG/DL (ref 1.6–2.6)
MCH RBC QN AUTO: 18.8 PG (ref 27–31)
MCH RBC QN AUTO: 18.9 PG (ref 27–31)
MCHC RBC AUTO-ENTMCNC: 29.9 G/DL (ref 32–36)
MCHC RBC AUTO-ENTMCNC: 30 G/DL (ref 32–36)
MCV RBC AUTO: 63 FL (ref 82–98)
MCV RBC AUTO: 63 FL (ref 82–98)
MONOCYTES # BLD AUTO: 0.4 K/UL (ref 0.3–1)
MONOCYTES # BLD AUTO: 0.6 K/UL (ref 0.3–1)
MONOCYTES NFR BLD: 8 % (ref 4–15)
MONOCYTES NFR BLD: 9.5 % (ref 4–15)
NEUTROPHILS # BLD AUTO: 4.3 K/UL (ref 1.8–7.7)
NEUTROPHILS # BLD AUTO: 4.6 K/UL (ref 1.8–7.7)
NEUTROPHILS NFR BLD: 77.4 % (ref 38–73)
NEUTROPHILS NFR BLD: 81.2 % (ref 38–73)
NRBC BLD-RTO: 1 /100 WBC
NRBC BLD-RTO: 1 /100 WBC
OHS QRS DURATION: 104 MS
OHS QRS DURATION: 116 MS
OHS QRS DURATION: 122 MS
OHS QTC CALCULATION: 462 MS
OHS QTC CALCULATION: 464 MS
OHS QTC CALCULATION: 490 MS
OVALOCYTES BLD QL SMEAR: ABNORMAL
OVALOCYTES BLD QL SMEAR: ABNORMAL
PHOSPHATE SERPL-MCNC: 2.5 MG/DL (ref 2.7–4.5)
PHOSPHATE SERPL-MCNC: 2.5 MG/DL (ref 2.7–4.5)
PLATELET # BLD AUTO: 132 K/UL (ref 150–450)
PLATELET # BLD AUTO: 145 K/UL (ref 150–450)
PLATELET BLD QL SMEAR: ABNORMAL
PLATELET BLD QL SMEAR: ABNORMAL
PMV BLD AUTO: ABNORMAL FL (ref 9.2–12.9)
PMV BLD AUTO: ABNORMAL FL (ref 9.2–12.9)
POCT GLUCOSE: 127 MG/DL (ref 70–110)
POCT GLUCOSE: 158 MG/DL (ref 70–110)
POCT GLUCOSE: 162 MG/DL (ref 70–110)
POCT GLUCOSE: 178 MG/DL (ref 70–110)
POCT GLUCOSE: 182 MG/DL (ref 70–110)
POCT GLUCOSE: 182 MG/DL (ref 70–110)
POCT GLUCOSE: 407 MG/DL (ref 70–110)
POIKILOCYTOSIS BLD QL SMEAR: ABNORMAL
POIKILOCYTOSIS BLD QL SMEAR: SLIGHT
POLYCHROMASIA BLD QL SMEAR: ABNORMAL
POLYCHROMASIA BLD QL SMEAR: ABNORMAL
POTASSIUM SERPL-SCNC: 4.5 MMOL/L (ref 3.5–5.1)
POTASSIUM SERPL-SCNC: 4.9 MMOL/L (ref 3.5–5.1)
POTASSIUM SERPL-SCNC: 5.2 MMOL/L (ref 3.5–5.1)
PROT SERPL-MCNC: 6.3 G/DL (ref 6–8.4)
PROT SERPL-MCNC: 6.5 G/DL (ref 6–8.4)
RBC # BLD AUTO: 5.25 M/UL (ref 4–5.4)
RBC # BLD AUTO: 5.33 M/UL (ref 4–5.4)
SCHISTOCYTES BLD QL SMEAR: ABNORMAL
SCHISTOCYTES BLD QL SMEAR: PRESENT
SODIUM SERPL-SCNC: 137 MMOL/L (ref 136–145)
SODIUM SERPL-SCNC: 137 MMOL/L (ref 136–145)
SODIUM SERPL-SCNC: 138 MMOL/L (ref 136–145)
SPHEROCYTES BLD QL SMEAR: ABNORMAL
SPHEROCYTES BLD QL SMEAR: ABNORMAL
TARGETS BLD QL SMEAR: ABNORMAL
WBC # BLD AUTO: 5.28 K/UL (ref 3.9–12.7)
WBC # BLD AUTO: 5.97 K/UL (ref 3.9–12.7)

## 2024-05-05 PROCEDURE — 25000003 PHARM REV CODE 250: Performed by: STUDENT IN AN ORGANIZED HEALTH CARE EDUCATION/TRAINING PROGRAM

## 2024-05-05 PROCEDURE — 85025 COMPLETE CBC W/AUTO DIFF WBC: CPT | Performed by: STUDENT IN AN ORGANIZED HEALTH CARE EDUCATION/TRAINING PROGRAM

## 2024-05-05 PROCEDURE — 99223 1ST HOSP IP/OBS HIGH 75: CPT | Mod: ,,, | Performed by: INTERNAL MEDICINE

## 2024-05-05 PROCEDURE — 93005 ELECTROCARDIOGRAM TRACING: CPT

## 2024-05-05 PROCEDURE — 80053 COMPREHEN METABOLIC PANEL: CPT | Performed by: STUDENT IN AN ORGANIZED HEALTH CARE EDUCATION/TRAINING PROGRAM

## 2024-05-05 PROCEDURE — 84100 ASSAY OF PHOSPHORUS: CPT | Mod: 91 | Performed by: STUDENT IN AN ORGANIZED HEALTH CARE EDUCATION/TRAINING PROGRAM

## 2024-05-05 PROCEDURE — 25000242 PHARM REV CODE 250 ALT 637 W/ HCPCS: Performed by: STUDENT IN AN ORGANIZED HEALTH CARE EDUCATION/TRAINING PROGRAM

## 2024-05-05 PROCEDURE — 83735 ASSAY OF MAGNESIUM: CPT | Mod: 91 | Performed by: STUDENT IN AN ORGANIZED HEALTH CARE EDUCATION/TRAINING PROGRAM

## 2024-05-05 PROCEDURE — 25000003 PHARM REV CODE 250: Performed by: INTERNAL MEDICINE

## 2024-05-05 PROCEDURE — 93010 ELECTROCARDIOGRAM REPORT: CPT | Mod: ,,, | Performed by: INTERNAL MEDICINE

## 2024-05-05 PROCEDURE — 36415 COLL VENOUS BLD VENIPUNCTURE: CPT | Performed by: STUDENT IN AN ORGANIZED HEALTH CARE EDUCATION/TRAINING PROGRAM

## 2024-05-05 PROCEDURE — 83605 ASSAY OF LACTIC ACID: CPT | Mod: 91 | Performed by: STUDENT IN AN ORGANIZED HEALTH CARE EDUCATION/TRAINING PROGRAM

## 2024-05-05 PROCEDURE — 83036 HEMOGLOBIN GLYCOSYLATED A1C: CPT | Performed by: STUDENT IN AN ORGANIZED HEALTH CARE EDUCATION/TRAINING PROGRAM

## 2024-05-05 PROCEDURE — 94640 AIRWAY INHALATION TREATMENT: CPT

## 2024-05-05 PROCEDURE — 63600175 PHARM REV CODE 636 W HCPCS: Performed by: STUDENT IN AN ORGANIZED HEALTH CARE EDUCATION/TRAINING PROGRAM

## 2024-05-05 PROCEDURE — A4216 STERILE WATER/SALINE, 10 ML: HCPCS | Performed by: INTERNAL MEDICINE

## 2024-05-05 PROCEDURE — 20600001 HC STEP DOWN PRIVATE ROOM

## 2024-05-05 PROCEDURE — 94761 N-INVAS EAR/PLS OXIMETRY MLT: CPT

## 2024-05-05 PROCEDURE — 80048 BASIC METABOLIC PNL TOTAL CA: CPT | Mod: XB | Performed by: STUDENT IN AN ORGANIZED HEALTH CARE EDUCATION/TRAINING PROGRAM

## 2024-05-05 PROCEDURE — 27000207 HC ISOLATION

## 2024-05-05 RX ORDER — SODIUM CHLORIDE 0.9 % (FLUSH) 0.9 %
10 SYRINGE (ML) INJECTION
Status: DISCONTINUED | OUTPATIENT
Start: 2024-05-05 | End: 2024-05-08 | Stop reason: HOSPADM

## 2024-05-05 RX ORDER — CETIRIZINE HYDROCHLORIDE 10 MG/1
10 TABLET ORAL DAILY
Status: DISCONTINUED | OUTPATIENT
Start: 2024-05-05 | End: 2024-05-08 | Stop reason: HOSPADM

## 2024-05-05 RX ORDER — ATORVASTATIN CALCIUM 40 MG/1
40 TABLET, FILM COATED ORAL NIGHTLY
Status: DISCONTINUED | OUTPATIENT
Start: 2024-05-05 | End: 2024-05-08 | Stop reason: HOSPADM

## 2024-05-05 RX ORDER — SODIUM CHLORIDE 0.9 % (FLUSH) 0.9 %
10 SYRINGE (ML) INJECTION EVERY 6 HOURS
Status: DISCONTINUED | OUTPATIENT
Start: 2024-05-05 | End: 2024-05-08 | Stop reason: HOSPADM

## 2024-05-05 RX ORDER — ALLOPURINOL 100 MG/1
300 TABLET ORAL DAILY
Status: DISCONTINUED | OUTPATIENT
Start: 2024-05-05 | End: 2024-05-08 | Stop reason: HOSPADM

## 2024-05-05 RX ORDER — ALBUTEROL SULFATE 90 UG/1
2 AEROSOL, METERED RESPIRATORY (INHALATION) EVERY 6 HOURS PRN
Status: DISCONTINUED | OUTPATIENT
Start: 2024-05-05 | End: 2024-05-08 | Stop reason: HOSPADM

## 2024-05-05 RX ORDER — GLUCAGON 1 MG
1 KIT INJECTION
Status: DISCONTINUED | OUTPATIENT
Start: 2024-05-05 | End: 2024-05-08 | Stop reason: HOSPADM

## 2024-05-05 RX ORDER — FAMOTIDINE 20 MG/1
20 TABLET, FILM COATED ORAL DAILY
Status: DISCONTINUED | OUTPATIENT
Start: 2024-05-05 | End: 2024-05-08 | Stop reason: HOSPADM

## 2024-05-05 RX ORDER — FLUTICASONE PROPIONATE 50 MCG
2 SPRAY, SUSPENSION (ML) NASAL DAILY
Status: DISCONTINUED | OUTPATIENT
Start: 2024-05-05 | End: 2024-05-08 | Stop reason: HOSPADM

## 2024-05-05 RX ORDER — INSULIN ASPART 100 [IU]/ML
0-5 INJECTION, SOLUTION INTRAVENOUS; SUBCUTANEOUS
Status: DISCONTINUED | OUTPATIENT
Start: 2024-05-05 | End: 2024-05-08 | Stop reason: HOSPADM

## 2024-05-05 RX ORDER — IPRATROPIUM BROMIDE AND ALBUTEROL SULFATE 2.5; .5 MG/3ML; MG/3ML
3 SOLUTION RESPIRATORY (INHALATION) EVERY 6 HOURS PRN
Status: DISCONTINUED | OUTPATIENT
Start: 2024-05-05 | End: 2024-05-08 | Stop reason: HOSPADM

## 2024-05-05 RX ORDER — BUMETANIDE 1 MG/1
2 TABLET ORAL 2 TIMES DAILY
Status: DISCONTINUED | OUTPATIENT
Start: 2024-05-05 | End: 2024-05-05

## 2024-05-05 RX ORDER — DOBUTAMINE HYDROCHLORIDE 400 MG/100ML
5 INJECTION INTRAVENOUS CONTINUOUS
Status: DISCONTINUED | OUTPATIENT
Start: 2024-05-05 | End: 2024-05-08 | Stop reason: HOSPADM

## 2024-05-05 RX ORDER — MAGNESIUM SULFATE HEPTAHYDRATE 40 MG/ML
2 INJECTION, SOLUTION INTRAVENOUS ONCE
Status: COMPLETED | OUTPATIENT
Start: 2024-05-05 | End: 2024-05-05

## 2024-05-05 RX ORDER — IBUPROFEN 200 MG
16 TABLET ORAL
Status: DISCONTINUED | OUTPATIENT
Start: 2024-05-05 | End: 2024-05-08 | Stop reason: HOSPADM

## 2024-05-05 RX ORDER — ASPIRIN 81 MG/1
81 TABLET ORAL DAILY
Status: DISCONTINUED | OUTPATIENT
Start: 2024-05-05 | End: 2024-05-08 | Stop reason: HOSPADM

## 2024-05-05 RX ORDER — FUROSEMIDE 10 MG/ML
80 INJECTION INTRAMUSCULAR; INTRAVENOUS ONCE
Status: COMPLETED | OUTPATIENT
Start: 2024-05-05 | End: 2024-05-05

## 2024-05-05 RX ORDER — OSELTAMIVIR PHOSPHATE 30 MG/1
30 CAPSULE ORAL DAILY
Status: DISCONTINUED | OUTPATIENT
Start: 2024-05-05 | End: 2024-05-08 | Stop reason: HOSPADM

## 2024-05-05 RX ORDER — ASPIRIN 81 MG/1
81 TABLET ORAL DAILY
COMMUNITY
Start: 2024-04-19

## 2024-05-05 RX ORDER — ONDANSETRON 8 MG/1
8 TABLET, ORALLY DISINTEGRATING ORAL EVERY 8 HOURS PRN
Status: DISCONTINUED | OUTPATIENT
Start: 2024-05-05 | End: 2024-05-08 | Stop reason: HOSPADM

## 2024-05-05 RX ORDER — AMIODARONE HYDROCHLORIDE 200 MG/1
200 TABLET ORAL 2 TIMES DAILY
Status: DISCONTINUED | OUTPATIENT
Start: 2024-05-05 | End: 2024-05-08 | Stop reason: HOSPADM

## 2024-05-05 RX ORDER — IBUPROFEN 200 MG
24 TABLET ORAL
Status: DISCONTINUED | OUTPATIENT
Start: 2024-05-05 | End: 2024-05-08 | Stop reason: HOSPADM

## 2024-05-05 RX ORDER — FUROSEMIDE 10 MG/ML
40 INJECTION INTRAMUSCULAR; INTRAVENOUS ONCE
Status: DISCONTINUED | OUTPATIENT
Start: 2024-05-05 | End: 2024-05-05

## 2024-05-05 RX ORDER — PREDNISONE 20 MG/1
40 TABLET ORAL DAILY
Status: DISCONTINUED | OUTPATIENT
Start: 2024-05-05 | End: 2024-05-07

## 2024-05-05 RX ORDER — PREGABALIN 50 MG/1
100 CAPSULE ORAL NIGHTLY
Status: DISCONTINUED | OUTPATIENT
Start: 2024-05-05 | End: 2024-05-08 | Stop reason: HOSPADM

## 2024-05-05 RX ORDER — PROMETHAZINE HYDROCHLORIDE AND CODEINE PHOSPHATE 6.25; 1 MG/5ML; MG/5ML
5 SOLUTION ORAL EVERY 4 HOURS PRN
Status: DISCONTINUED | OUTPATIENT
Start: 2024-05-05 | End: 2024-05-08 | Stop reason: HOSPADM

## 2024-05-05 RX ADMIN — PREGABALIN 100 MG: 50 CAPSULE ORAL at 03:05

## 2024-05-05 RX ADMIN — AMIODARONE HYDROCHLORIDE 200 MG: 200 TABLET ORAL at 08:05

## 2024-05-05 RX ADMIN — TIOTROPIUM BROMIDE INHALATION SPRAY 2 PUFF: 3.12 SPRAY, METERED RESPIRATORY (INHALATION) at 07:05

## 2024-05-05 RX ADMIN — HYDRALAZINE HYDROCHLORIDE: 10 TABLET, FILM COATED ORAL at 08:05

## 2024-05-05 RX ADMIN — ASPIRIN 81 MG: 81 TABLET, COATED ORAL at 08:05

## 2024-05-05 RX ADMIN — MAGNESIUM SULFATE HEPTAHYDRATE 2 G: 40 INJECTION, SOLUTION INTRAVENOUS at 03:05

## 2024-05-05 RX ADMIN — PREGABALIN 100 MG: 50 CAPSULE ORAL at 08:05

## 2024-05-05 RX ADMIN — APIXABAN 2.5 MG: 2.5 TABLET, FILM COATED ORAL at 08:05

## 2024-05-05 RX ADMIN — FLUTICASONE PROPIONATE 100 MCG: 50 SPRAY, METERED NASAL at 08:05

## 2024-05-05 RX ADMIN — DOBUTAMINE HYDROCHLORIDE 5 MCG/KG/MIN: 400 INJECTION INTRAVENOUS at 03:05

## 2024-05-05 RX ADMIN — ONDANSETRON 8 MG: 8 TABLET, ORALLY DISINTEGRATING ORAL at 09:05

## 2024-05-05 RX ADMIN — CETIRIZINE HYDROCHLORIDE 10 MG: 10 TABLET, FILM COATED ORAL at 08:05

## 2024-05-05 RX ADMIN — FAMOTIDINE 20 MG: 20 TABLET ORAL at 08:05

## 2024-05-05 RX ADMIN — ATORVASTATIN CALCIUM 40 MG: 40 TABLET, FILM COATED ORAL at 03:05

## 2024-05-05 RX ADMIN — HYDRALAZINE HYDROCHLORIDE: 10 TABLET, FILM COATED ORAL at 02:05

## 2024-05-05 RX ADMIN — Medication 10 ML: at 06:05

## 2024-05-05 RX ADMIN — FUROSEMIDE 80 MG: 10 INJECTION, SOLUTION INTRAMUSCULAR; INTRAVENOUS at 05:05

## 2024-05-05 RX ADMIN — OSELTAMIVIR PHOSPHATE 30 MG: 30 CAPSULE ORAL at 08:05

## 2024-05-05 RX ADMIN — FUROSEMIDE 80 MG: 10 INJECTION, SOLUTION INTRAMUSCULAR; INTRAVENOUS at 06:05

## 2024-05-05 RX ADMIN — PROMETHAZINE HYDROCHLORIDE AND CODEINE PHOSPHATE 5 ML: 6.25; 1 SOLUTION ORAL at 03:05

## 2024-05-05 RX ADMIN — EMPAGLIFLOZIN 10 MG: 10 TABLET, FILM COATED ORAL at 08:05

## 2024-05-05 RX ADMIN — Medication 10 ML: at 11:05

## 2024-05-05 RX ADMIN — PROMETHAZINE HYDROCHLORIDE AND CODEINE PHOSPHATE 5 ML: 6.25; 1 SOLUTION ORAL at 09:05

## 2024-05-05 RX ADMIN — ATORVASTATIN CALCIUM 40 MG: 40 TABLET, FILM COATED ORAL at 08:05

## 2024-05-05 RX ADMIN — PREDNISONE 40 MG: 20 TABLET ORAL at 08:05

## 2024-05-05 RX ADMIN — ALLOPURINOL 300 MG: 100 TABLET ORAL at 08:05

## 2024-05-05 NOTE — ASSESSMENT & PLAN NOTE
Patient's COPD is controlled currently. Continue scheduled home inhalers and monitor respiratory status closely.   -Unsure if patient was started on steroids for COPD exacerbation  -Prednisone 40mg daily will treat for 5 days, patient took dose yesterday

## 2024-05-05 NOTE — HPI
"Patient is a 58 y.o female with h/o end stage niCM with EF 15%, not a candidate for advanced options, on palliative home  who presented as a  transfer from [facility] ED for influenza and ADHF. She initially presented to the OSH due to hyperglycemic readings at home and feeling fatigue. Patient reports she has chronic cough, denied fever orthopnea or PND. Patient states she takes her bumex and metolazone as needed. She states PCP prescribed prednisone and she started day prior to admission. Patient reports she was given steroids to "boost her energy"    On arrival to outside ED labs were signficant for cr 2.6 (bl 2.2-2.6), BNP was noted to be 3000 (bl 500s), Trop 2.2*2.2 procal 0.19. lactic of 3    On arrival to [facility] she was found to be hemodynamically stable. Denies cp/chest discomfort but does report sore throat       "

## 2024-05-05 NOTE — ASSESSMENT & PLAN NOTE
Patient with Paroxysmal (<7 days) atrial fibrillation/AFL which is controlled currently with Amiodarone. Patient is currently in sinus rhythm.[id number] Score: 3. Anticoagulation indicated. Anticoagulation done with Eliquis .  -Continue home amiodarone  -Continue home eliquis

## 2024-05-05 NOTE — ED PROVIDER NOTES
"Encounter Date: 5/4/2024       History     Chief Complaint   Patient presents with    Hyperglycemia     History of Present Illness  Hafsa Hawley is a 58 y.o. female that presents with hyperglycemia  Patient presents with a high blood sugar at home, history of diabetes  Patient was on Ozempic as well as an oral diabetic medication daily  Additionally the patient has not been feeling well, ischemic cardiomyopathy  Patient has a not a constant dobutamine drip, followed at [facility]  Patient felt her heart racing at home, brief episode of aflutter in the ER    The history is provided by the patient.     Review of patient's allergies indicates:   Allergen Reactions    Penicillins Hives and Other (See Comments)    Iodinated contrast media Nausea And Vomiting    Oxycodone-acetaminophen Other (See Comments)     Nausea, Dizziness, Anxiety.  "I don't like how it makes me feel."   Given Hydromorphone 0.5mg IVP  Without problems.  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 Metabolism of other drugs not listed here may also be impacted by UGT1A1 enzyme activity.       Tramadol Nausea And Vomiting and Other (See Comments)     Other reaction(s): Other (See Comments)    Valsartan Other (See Comments)     Past Medical History:   Diagnosis Date    Allergy     Anemia     Arthritis     Atrial fibrillation     OAC    Chronic respiratory failure with hypoxia, on home oxygen therapy     " 2L with activity, off at rest.  Per Pulm  no overt evidence of ILD or COPD on PFTs and CT to explain O2 needs.    CKD (chronic kidney disease), stage IV 05/08/2018    Congestive heart failure     s/p AICD placement,    Deep vein thrombosis     Depression     elevated bilirubin d/t Gilbert's syndrome     confirmed by [facility] genetic testing, evaluated by hepatology    Encounter for blood transfusion     GERD (gastroesophageal reflux disease)     Hypertension     Pheochromocytoma, malignant     Right kidney mass     Sleep apnea     Thalassemia trait, alpha     Thyroid disease     Type 2 diabetes mellitus with hyperglycemia, without long-term current use of insulin 08/13/2020     Past Surgical History:   Procedure Laterality Date    ANGIOGRAM, ABDOMINAL AORTA Right 04/15/2024    APPENDECTOMY      BONE MARROW BIOPSY      CARDIAC DEFIBRILLATOR PLACEMENT Left 12/2016    CARDIAC ELECTROPHYSIOLOGY MAPPING AND ABLATION      CARDIAC ELECTROPHYSIOLOGY MAPPING AND ABLATION      COLONOSCOPY N/A 05/06/2022    Procedure: COLONOSCOPY;  Surgeon: Arely Betancourt MD;  Location: [facility] ENDO ([address] [place] [address]);  Service: Endoscopy;  Laterality: N/A;  heart transplant candidate/ EF 25% - 2nd floor/ defib - Biotronik - ERW  Eliquis - per Dr. Cortez with [facility], Pt ok to hold Eliquis x 2 days prior-see media tab-outside correspondence dated 12/30/21  - ERW  verbal instructions/portal instructions/email instructions - s    EYE SURGERY      due to running tears    FRACTURE SURGERY Left     hand 5th digit    HYSTERECTOMY      KNEE SURGERY Left 2016    hematoma    LIVER BIOPSY  10/24/2018    Minimal steatosis, predominantly macrovesicular, 1%, Minimal nonspecific portal inflammation, no fibrosis. No findings on biopsy to explain elevated bilirubin levels. Could be d/t Gilbert's =?- hemolysis    RIGHT HEART CATHETERIZATION Right 12/07/2021    Procedure: INSERTION, CATHETER, RIGHT HEART;  Surgeon: Irving Cardenas MD;  Location: [facility] CATH LAB;  Service:  Cardiology;  Laterality: Right;    RIGHT HEART CATHETERIZATION Right 2022    Procedure: INSERTION, CATHETER, RIGHT HEART;  Surgeon: Burke Camilo MD;  Location: [facility] CATH LAB;  Service: Cardiology;  Laterality: Right;    RIGHT HEART CATHETERIZATION Right 2023    Procedure: INSERTION, CATHETER, RIGHT HEART;  Surgeon: Katie Liriano DO;  Location: [facility] CATH LAB;  Service: Cardiology;  Laterality: Right;    TRANSJUGULAR BIOPSY OF LIVER N/A 10/24/2018    Procedure: BIOPSY, LIVER, TRANSJUGULAR APPROACH;  Surgeon: Carmen Diagnostic Provider;  Location: [facility] OR [address] [place] [address];  Service: Radiology;  Laterality: N/A;     Family History   Problem Relation Name Age of Onset    Cancer Mother          pancreatic CA early 50's    Heart disease Father           MI in late 50's    Hypertension Father      Heart attack Father      Heart disease Sister      Heart disease Brother      Cirrhosis Brother          alcoholic    Heart disease Sister      Heart disease Brother      Hypertension Brother      Diabetes Brother       Social History     Tobacco Use    Smoking status: Never    Smokeless tobacco: Never   Substance Use Topics    Alcohol use: Not Currently     Comment: up to 1 yr ago drank 2-3 drinks on occasion but sporadic    Drug use: No     Review of Systems   Constitutional:  Positive for fatigue.   HENT:  Positive for congestion.    Eyes: Negative.    Respiratory:  Positive for cough.    Cardiovascular:  Positive for palpitations.   Gastrointestinal: Negative.    Genitourinary: Negative.    Musculoskeletal: Negative.    Skin: Negative.    Neurological: Negative.    Psychiatric/Behavioral: Negative.         Physical Exam     Initial Vitals [24 1731]   BP Pulse Resp Temp SpO2   125/84 (!) 111 18 98.8 °F (37.1 °C) 95 %      MAP       --         Physical Exam    Nursing note and vitals reviewed.  Constitutional: Vital signs are normal. She appears well-developed and well-nourished. She is cooperative.    HENT:   Head: Normocephalic and atraumatic.   Eyes: Conjunctivae, EOM and lids are normal. Pupils are equal, round, and reactive to light.   Neck: Trachea normal and phonation normal. Neck supple. No JVD present.   Normal range of motion.   Full passive range of motion without pain.     Cardiovascular:  Normal rate, regular rhythm, S1 normal, S2 normal, normal heart sounds, intact distal pulses and normal pulses.           Pulmonary/Chest: Effort normal and breath sounds normal.   Abdominal: Abdomen is soft and flat. Bowel sounds are normal.   Musculoskeletal:         General: Normal range of motion.      Cervical back: Full passive range of motion without pain, normal range of motion and neck supple.     Neurological: She is alert and oriented to person, place, and time. She has normal strength.   Skin: Skin is warm, dry and intact. Capillary refill takes less than 2 seconds.         ED Course   Procedures  Labs Reviewed   INFLUENZA A & B BY MOLECULAR - Abnormal; Notable for the following components:       Result Value    Influenza A, Molecular Positive (*)     All other components within normal limits   CBC W/ AUTO DIFFERENTIAL - Abnormal; Notable for the following components:    RBC 5.67 (*)     Hemoglobin 10.7 (*)     Hematocrit 35.8 (*)     MCV 63 (*)     MCH 18.9 (*)     MCHC 29.9 (*)     RDW 29.2 (*)     Platelets 126 (*)     Immature Granulocytes 0.7 (*)     Lymph # 0.2 (*)     Mono # 0.0 (*)     nRBC 1 (*)     Gran % 94.5 (*)     Lymph % 3.9 (*)     Mono % 0.7 (*)     All other components within normal limits   COMPREHENSIVE METABOLIC PANEL - Abnormal; Notable for the following components:    CO2 22 (*)     Glucose 345 (*)     BUN 69 (*)     Creatinine 2.9 (*)     Total Bilirubin 1.7 (*)     Alkaline Phosphatase 47 (*)     eGFR 18 (*)     All other components within normal limits   TROPONIN I - Abnormal; Notable for the following components:    Troponin I 2.235 (*)     All other components within normal  limits   B-TYPE NATRIURETIC PEPTIDE - Abnormal; Notable for the following components:    BNP 3,051 (*)     All other components within normal limits   LACTIC ACID, PLASMA - Abnormal; Notable for the following components:    Lactate (Lactic Acid) 3.3 (*)     All other components within normal limits   URINALYSIS, REFLEX TO URINE CULTURE - Abnormal; Notable for the following components:    Protein, UA 1+ (*)     Glucose, UA 2+ (*)     Occult Blood UA 2+ (*)     All other components within normal limits    Narrative:     Specimen Source->Urine   GROUP A STREP, MOLECULAR   CULTURE, BLOOD   CULTURE, BLOOD   CK   PROCALCITONIN   SARS-COV-2 RNA AMPLIFICATION, QUAL   URINALYSIS MICROSCOPIC    Narrative:     Specimen Source->Urine   POCT GLUCOSE MONITORING CONTINUOUS     EKG Readings: (Independently Interpreted)   No STEMI  Sinus tachycardia  No ectopy  Normal conduction  Normal ST segments  Normal T-wave  Normal axis  Heart rate in the 90s       Imaging Results              X-Ray Chest 1 View (Final result)  Result time 05/04/24 18:26:59      Final result by Rogelio Motta DO (05/04/24 18:26:59)                   Impression:      No acute abnormality.      Electronically signed by: Rogelio Motta  Date:    05/04/2024  Time:    18:26               Narrative:    EXAMINATION:  XR CHEST 1 VIEW    CLINICAL HISTORY:  fatigue;    TECHNIQUE:  Single frontal view of the chest was performed.    COMPARISON:  04/08/2024.    FINDINGS:  Cardiac pacer/AICD and right PICC are unchanged.  The lungs are well expanded and clear.  No focal opacities are seen.  The pleural spaces are clear.  The cardiac silhouette is enlarged.  Osseous structures are intact.                                       Medications   insulin regular injection 5 Units 0.05 mL (5 Units Intravenous Given 5/4/24 1906)   aspirin tablet 325 mg (325 mg Oral Given 5/4/24 2055)     Medical Decision Making  58-year-old female with ischemic heart disease with high blood sugar  at home  Patient also felt her heart racing, atrial flutter noted on initial triage in the ER  Patient has a ischemic cardiomyopathy, on a constant dobutamine drip daily  Differential includes diabetic ketoacidosis, hyperglycemia, metabolic abnormality  Differential also includes atrial flutter, atrial fibrillation, tachy arrhythmia NOS  Differential also includes flu, COVID, strep, pneumonia, end-stage heart disease    Problems Addressed:  Acute on chronic combined systolic and diastolic heart failure: complicated acute illness or injury  CKD (chronic kidney disease) stage 4, GFR 15-29 ml/min: complicated acute illness or injury  Elevated troponin: complicated acute illness or injury  Fatigue: complicated acute illness or injury  ICD (implantable cardioverter-defibrillator) in place: complicated acute illness or injury  Influenza: complicated acute illness or injury  NICM (nonischemic cardiomyopathy): acute illness or injury  Pulmonary arterial hypertension: complicated acute illness or injury  Pulmonary hypertension: complicated acute illness or injury  Shortness of breath: complicated acute illness or injury    Amount and/or Complexity of Data Reviewed  Independent Historian: spouse  External Data Reviewed: ECG and notes.  Labs: ordered. Decision-making details documented in ED Course.  Radiology: ordered and independent interpretation performed.  ECG/medicine tests: ordered and independent interpretation performed.    ED Management & Risks of Complication, Morbidity, & Mortality:  Patient feeling fatigue, (+) for influenza in the emergency room today  Chest x-ray appears stable, BNP markedly elevated over 3000  Troponin markedly elevated over 2000 on ER evaluation  Blood sugar elevated with no obvious signs of diabetic ketoacidosis  Patient feeling poorly, given aspirin in the emergency room today  IV insulin given for hyperglycemia, run of atrial flutter in the ER today  Will reach out to [facility] for  transfer on evaluation by specialist    Critical Care ED Physician Time (minutes):  -- Performed by: Adriano Low M.D.  -- Date/Time: 9:06 PM 5/4/2024   -- Direct Patient Care (Face Time): 15  -- Additional History from Records or Taking Additional History: 15  -- Ordering, Reviewing, and Interpreting Diagnostic Studies: 15  -- Total Time in Documentation: 15  -- Consultation with Other Physicians: 15  -- Consultation with Family Related to Condition: 15  -- Total Critical Care Time: 75  -- Critical care: atrial flutter/hyperglycemia/cardiomyopathy  -- Critical care was time spent personally by me on the following activities:   -- discussions with consultants regarding treatment plan today  -- development of treatment plan with patient & their family  -- examination of patient, ordering and performing treatments   -- review of radiographic studies, re-evaluation of pt's condition  -- review of labs and evaluation of response to treatment     Clinical Impression:  Final diagnoses:  [R53.83] Fatigue  [J11.1] Influenza  [R79.89] Elevated troponin (Primary)  [I27.20] Pulmonary hypertension  [I27.21] Pulmonary arterial hypertension  [N18.4] CKD (chronic kidney disease) stage 4, GFR 15-29 ml/min  [R06.02] Shortness of breath  [I50.43] Acute on chronic combined systolic and diastolic heart failure  [Z95.810] ICD (implantable cardioverter-defibrillator) in place (Chronic)  [I42.8] NICM (nonischemic cardiomyopathy) (Chronic)  [E11.22, N18.4] Type 2 diabetes mellitus with stage 4 chronic kidney disease, without long-term current use of insulin  [R73.9] Hyperglycemia          ED Disposition Condition    Transfer to Another Facility Adriano Artis MD  05/04/24 3601

## 2024-05-05 NOTE — SUBJECTIVE & OBJECTIVE
Past Medical History:   Diagnosis Date    Allergy     Anemia     Arthritis     Atrial fibrillation     OAC    Chronic respiratory failure with hypoxia, on home oxygen therapy     2L with activity, off at rest.  Per Pulm  no overt evidence of ILD or COPD on PFTs and CT to explain O2 needs.    CKD (chronic kidney disease), stage IV 05/08/2018    Congestive heart failure     s/p AICD placement,    Deep vein thrombosis     Depression     elevated bilirubin d/t Gilbert's syndrome     confirmed by [facility] genetic testing, evaluated by hepatology    Encounter for blood transfusion     GERD (gastroesophageal reflux disease)     Hypertension     Pheochromocytoma, malignant     Right kidney mass     Sleep apnea     Thalassemia trait, alpha     Thyroid disease     Type 2 diabetes mellitus with hyperglycemia, without long-term current use of insulin 08/13/2020       Past Surgical History:   Procedure Laterality Date    ANGIOGRAM, ABDOMINAL AORTA Right 04/15/2024    APPENDECTOMY      BONE MARROW BIOPSY      CARDIAC DEFIBRILLATOR PLACEMENT Left 12/2016    CARDIAC ELECTROPHYSIOLOGY MAPPING AND ABLATION      CARDIAC ELECTROPHYSIOLOGY MAPPING AND ABLATION      COLONOSCOPY N/A 05/06/2022    Procedure: COLONOSCOPY;  Surgeon: Arely Betancourt MD;  Location: [facility] ([address] [place] [address]);  Service: Endoscopy;  Laterality: N/A;  heart transplant candidate/ EF 25% - 2nd floor/ defib - Biotronik - ERW  Eliquis - per Dr. Cortez with [facility], Pt ok to hold Eliquis x 2 days prior-see media tab-outside correspondence dated 12/30/21  - ERW  verbal instructions/portal instructions/email instructions - s    EYE SURGERY      due to running tears    FRACTURE SURGERY Left     hand 5th digit    HYSTERECTOMY      KNEE SURGERY Left 2016    hematoma    LIVER BIOPSY  10/24/2018    Minimal steatosis, predominantly macrovesicular, 1%, Minimal nonspecific portal inflammation, no fibrosis. No findings on biopsy to explain elevated bilirubin levels. Could be d/t  "Gilbert's =?- hemolysis    RIGHT HEART CATHETERIZATION Right 12/07/2021    Procedure: INSERTION, CATHETER, RIGHT HEART;  Surgeon: Irving Cardenas MD;  Location: [facility] CATH LAB;  Service: Cardiology;  Laterality: Right;    RIGHT HEART CATHETERIZATION Right 12/19/2022    Procedure: INSERTION, CATHETER, RIGHT HEART;  Surgeon: Burke Camilo MD;  Location: [facility] CATH LAB;  Service: Cardiology;  Laterality: Right;    RIGHT HEART CATHETERIZATION Right 03/29/2023    Procedure: INSERTION, CATHETER, RIGHT HEART;  Surgeon: Katie Liriano DO;  Location: [facility] CATH LAB;  Service: Cardiology;  Laterality: Right;    TRANSJUGULAR BIOPSY OF LIVER N/A 10/24/2018    Procedure: BIOPSY, LIVER, TRANSJUGULAR APPROACH;  Surgeon: Carmen Diagnostic Provider;  Location: [facility] OR [address] [place] [address];  Service: Radiology;  Laterality: N/A;       Review of patient's allergies indicates:   Allergen Reactions    Penicillins Hives and Other (See Comments)    Iodinated contrast media Nausea And Vomiting    Oxycodone-acetaminophen Other (See Comments)     Nausea, Dizziness, Anxiety.  "I don't like how it makes me feel."   Given Hydromorphone 0.5mg IVP  Without problems.  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 Metabolism of other drugs not listed here may also be impacted by UGT1A1 enzyme activity.       Tramadol Nausea And Vomiting and Other (See Comments)     Other reaction(s): Other (See Comments)    " Valsartan Other (See Comments)       Current Facility-Administered Medications   Medication Dose Route Frequency Provider Last Rate Last Admin    albuterol inhaler 2 puff  2 puff Inhalation Q6H PRN Dena Monterroso MD        albuterol-ipratropium 2.5 mg-0.5 mg/3 mL nebulizer solution 3 mL  3 mL Nebulization Q6H PRN Dena Monterroso MD        allopurinoL tablet 300 mg  300 mg Oral Daily Dena Monterroso MD        amiodarone tablet 200 mg  200 mg Oral BID Dena Monterroso MD        apixaban tablet 2.5 mg  2.5 mg Oral BID Dena Monterroso MD        aspirin EC tablet 81 mg  81 mg Oral Daily Dena Monterroso MD        atorvastatin tablet 40 mg  40 mg Oral QHS Dena Monterroso MD   40 mg at 05/05/24 0329    cetirizine tablet 10 mg  10 mg Oral Daily Dena Monterroso MD        dextrose 10% bolus 125 mL 125 mL  12.5 g Intravenous PRN Dena Monterroso MD        dextrose 10% bolus 250 mL 250 mL  25 g Intravenous PRN Dena Monterroso MD        DOBUtamine 1000 mg in D5W 250 mL infusion  5 mcg/kg/min Intravenous Continuous Dena Monterroso MD 6.1 mL/hr at 05/05/24 0343 5 mcg/kg/min at 05/05/24 0343    famotidine tablet 20 mg  20 mg Oral Daily Dena Monterroso MD        fluticasone propionate 50 mcg/actuation nasal spray 100 mcg  2 spray Each Nostril Daily Dena Monterroso MD        furosemide injection 80 mg  80 mg Intravenous Once Dena Monterroso MD        glucagon (human recombinant) injection 1 mg  1 mg Intramuscular PRN Dena Monterroso MD        glucose chewable tablet 16 g  16 g Oral PRN Dena Monterroso MD        glucose chewable tablet 24 g  24 g Oral PRN Dena Monterroso MD        insulin aspart U-100 pen 0-5 Units  0-5 Units Subcutaneous QID (AC + HS) Dena Snyder MD         magnesium sulfate 2g in water 50mL IVPB (premix)  2 g Intravenous Once Dena Monterroso MD 25 mL/hr at 05/05/24 0348 2 g at 05/05/24 0348    ondansetron disintegrating tablet 8 mg  8 mg Oral Q8H PRN Dena Monterroso MD        oseltamivir capsule 30 mg  30 mg Oral Daily Dena Monterroso MD        pregabalin capsule 100 mg  100 mg Oral QHS Dena Monterroso MD   100 mg at 05/05/24 0329    promethazine-codeine 6.25-10 mg/5 ml syrup 5 mL  5 mL Oral Q4H PRN Dena Monterroso MD   5 mL at 05/05/24 0329    sodium chloride 0.9% flush 10 mL  10 mL Intravenous PRN Juana Beatty MD        sodium chloride 0.9% flush 10 mL  10 mL Intravenous Q6H Charbel Cordova MD        And    sodium chloride 0.9% flush 10 mL  10 mL Intravenous PRN Charbel Cordova MD        tiotropium bromide 2.5 mcg/actuation inhaler 2 puff  2 puff Inhalation Daily Dena Monterroso MD         Facility-Administered Medications Ordered in Other Encounters   Medication Dose Route Frequency Provider Last Rate Last Admin    0.9%  NaCl infusion   Intravenous Continuous Corinna Hayes NP   New Bag at 05/23/19 0745    vancomycin in dextrose 5 % 1 gram/250 mL IVPB 1,000 mg  1,000 mg Intravenous On Call Procedure Corinna Hayes NP   1,000 mg at 05/23/19 0736     Family History       Problem Relation (Age of Onset)    Cancer Mother    Cirrhosis Brother    Diabetes Brother    Heart attack Father    Heart disease Father, Sister, Brother, Sister, Brother    Hypertension Father, Brother          Tobacco Use    Smoking status: Never    Smokeless tobacco: Never   Substance and Sexual Activity    Alcohol use: Not Currently     Comment: up to 1 yr ago drank 2-3 drinks on occasion but sporadic    Drug use: No    Sexual activity: Yes     Partners: Male     Review of Systems   Constitutional: Positive for malaise/fatigue. Negative for chills, diaphoresis and night sweats.    HENT:  Positive for sore throat.    Cardiovascular:  Negative for chest pain, dyspnea on exertion, leg swelling, near-syncope and palpitations.   Respiratory:  Positive for cough and shortness of breath. Negative for wheezing.    Skin:  Negative for color change and dry skin.   Musculoskeletal:  Negative for joint pain and joint swelling.   Gastrointestinal:  Negative for abdominal pain, diarrhea, nausea and vomiting.   Genitourinary:  Negative for dysuria.   Neurological:  Positive for dizziness and weakness. Negative for headaches and light-headedness.   All other systems reviewed and are negative.    Objective:     Vital Signs (Most Recent):  Temp: 98.2 °F (36.8 °C) (05/05/24 0430)  Pulse: 102 (05/05/24 0430)  Resp: 18 (05/05/24 0430)  BP: (!) 126/90 (05/05/24 0430)  SpO2: 97 % (05/05/24 0430) Vital Signs (24h Range):  Temp:  [98.2 °F (36.8 °C)-98.8 °F (37.1 °C)] 98.2 °F (36.8 °C)  Pulse:  [] 102  Resp:  [17-22] 18  SpO2:  [95 %-99 %] 97 %  BP: (118-152)/(69-90) 126/90     Weight: 80.9 kg (178 lb 5.6 oz)  Body mass index is 28.79 kg/m².    SpO2: 97 %         Intake/Output Summary (Last 24 hours) at 5/5/2024 0438  Last data filed at 5/5/2024 0220  Gross per 24 hour   Intake --   Output 100 ml   Net -100 ml       Lines/Drains/Airways       Peripherally Inserted Central Catheter Line  Duration             PICC Double Lumen 05/12/23 1534 right brachial 358 days              Peripheral Intravenous Line  Duration                  Peripheral IV - Single Lumen 05/04/24 1807 20 G Anterior;Left Forearm <1 day                     Physical Exam  Constitutional:       Appearance: Normal appearance.   Eyes:      Pupils: Pupils are equal, round, and reactive to light.   Cardiovascular:      Rate and Rhythm: Normal rate and regular rhythm.      Pulses: Normal pulses.   Pulmonary:      Effort: Pulmonary effort is normal.      Breath sounds: Rales present.   Abdominal:      General: Abdomen is flat.      Palpations:  Abdomen is soft.   Musculoskeletal:      Cervical back: Normal range of motion.      Right lower leg: No edema.      Left lower leg: No edema.   Neurological:      General: No focal deficit present.      Mental Status: She is alert and oriented to person, place, and time.   Psychiatric:         Mood and Affect: Mood normal.         Behavior: Behavior normal.          Significant Labs: All pertinent lab results from the last 24 hours have been reviewed.

## 2024-05-05 NOTE — ASSESSMENT & PLAN NOTE
Patient with increased fatigue and sore throat. Chronic cough   -Continue home promethazine-codeine prn   -Tamiflu for 5 days 30mg daily, per GFR   -procal negative, no antibiotics for now

## 2024-05-05 NOTE — H&P
"[facility] - Transplant Stepdown  Cardiology  History and Physical     Patient Name: Hafsa Hawley  MRN: [id number]  Admission Date: 5/5/2024  Code Status: Full Code   Attending Provider: Charbel Cordova MD   Primary Care Physician: Cristobal Ann MD  Principal Problem:<principal problem not specified>    Patient information was obtained from patient and ER records.     Subjective:     Chief Complaint:  sob     HPI:  Patient is a 58 y.o female with h/o end stage niCM with EF 15%, not a candidate for advanced options, on palliative home  who presented as a  transfer from [facility] ED for influenza and ADHF. She initially presented to the OSH due to hyperglycemic readings at home and feeling fatigue. Patient reports she has chronic cough, denied fever orthopnea or PND. Patient states she takes her bumex and metolazone as needed. She states PCP prescribed prednisone and she started day prior to admission. Patient reports she was given steroids to "boost her energy"    On arrival to outside ED labs were signficant for cr 2.6 (bl 2.2-2.6), BNP was noted to be 3000 (bl 500s), Trop 2.2*2.2 procal 0.19. lactic of 3    On arrival to C she was found to be hemodynamically stable. Denies cp/chest discomfort but does report sore throat         Past Medical History:   Diagnosis Date    Allergy     Anemia     Arthritis     Atrial fibrillation     OAC    Chronic respiratory failure with hypoxia, on home oxygen therapy     2L with activity, off at rest.  Per Pulm  no overt evidence of ILD or COPD on PFTs and CT to explain O2 needs.    CKD (chronic kidney disease), stage IV 05/08/2018    Congestive heart failure     s/p AICD placement,    Deep vein thrombosis     Depression     elevated bilirubin d/t Gilbert's syndrome     confirmed by [place] genetic testing, evaluated by hepatology    Encounter for blood transfusion     GERD (gastroesophageal reflux disease)     Hypertension     Pheochromocytoma, malignant     Right kidney " mass     Sleep apnea     Thalassemia trait, alpha     Thyroid disease     Type 2 diabetes mellitus with hyperglycemia, without long-term current use of insulin 08/13/2020       Past Surgical History:   Procedure Laterality Date    ANGIOGRAM, ABDOMINAL AORTA Right 04/15/2024    APPENDECTOMY      BONE MARROW BIOPSY      CARDIAC DEFIBRILLATOR PLACEMENT Left 12/2016    CARDIAC ELECTROPHYSIOLOGY MAPPING AND ABLATION      CARDIAC ELECTROPHYSIOLOGY MAPPING AND ABLATION      COLONOSCOPY N/A 05/06/2022    Procedure: COLONOSCOPY;  Surgeon: Arely Betancourt MD;  Location: [facility] ENDO (2ND FLR);  Service: Endoscopy;  Laterality: N/A;  heart transplant candidate/ EF 25% - 2nd floor/ defib - Biotronik - ERW  Eliquis - per Dr. Cortez with [facility] [place], Pt ok to hold Eliquis x 2 days prior-see media tab-outside correspondence dated 12/30/21  - ERW  verbal instructions/portal instructions/email instructions - s    EYE SURGERY      due to running tears    FRACTURE SURGERY Left     hand 5th digit    HYSTERECTOMY      KNEE SURGERY Left 2016    hematoma    LIVER BIOPSY  10/24/2018    Minimal steatosis, predominantly macrovesicular, 1%, Minimal nonspecific portal inflammation, no fibrosis. No findings on biopsy to explain elevated bilirubin levels. Could be d/t Gilbert's =?- hemolysis    RIGHT HEART CATHETERIZATION Right 12/07/2021    Procedure: INSERTION, CATHETER, RIGHT HEART;  Surgeon: Irving Cardenas MD;  Location: [facility] CATH LAB;  Service: Cardiology;  Laterality: Right;    RIGHT HEART CATHETERIZATION Right 12/19/2022    Procedure: INSERTION, CATHETER, RIGHT HEART;  Surgeon: Burke Camilo MD;  Location: [facility] CATH LAB;  Service: Cardiology;  Laterality: Right;    RIGHT HEART CATHETERIZATION Right 03/29/2023    Procedure: INSERTION, CATHETER, RIGHT HEART;  Surgeon: Katie Liriano DO;  Location: [facility] CATH LAB;  Service: Cardiology;  Laterality: Right;    TRANSJUGULAR BIOPSY OF LIVER N/A 10/24/2018    Procedure: BIOPSY, LIVER,  "TRANSJUGULAR APPROACH;  Surgeon: Carmen Diagnostic Provider;  Location: [facility] OR [address] [place] [address];  Service: Radiology;  Laterality: N/A;       Review of patient's allergies indicates:   Allergen Reactions    Penicillins Hives and Other (See Comments)    Iodinated contrast media Nausea And Vomiting    Oxycodone-acetaminophen Other (See Comments)     Nausea, Dizziness, Anxiety.  "I don't like how it makes me feel."   Given Hydromorphone 0.5mg IVP  Without problems.  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 Metabolism of other drugs not listed here may also be impacted by UGT1A1 enzyme activity.       Tramadol Nausea And Vomiting and Other (See Comments)     Other reaction(s): Other (See Comments)    Valsartan Other (See Comments)       Current Facility-Administered Medications   Medication Dose Route Frequency Provider Last Rate Last Admin    albuterol inhaler 2 puff  2 puff Inhalation Q6H PRN Dena Monterroso MD        albuterol-ipratropium 2.5 mg-0.5 mg/3 mL nebulizer solution 3 mL  3 mL Nebulization Q6H PRN Dena Monterroso MD        allopurinoL tablet 300 mg  300 mg Oral Daily Dena Monterroso MD        amiodarone tablet 200 mg  200 mg Oral BID Dena Monterroso MD        apixaban tablet 2.5 mg  2.5 mg Oral BID Dena Monterroso MD        aspirin EC tablet 81 mg  81 mg Oral Daily Dean Monterroso MD     "    atorvastatin tablet 40 mg  40 mg Oral QHS Dena Monterroso MD   40 mg at 05/05/24 0329    cetirizine tablet 10 mg  10 mg Oral Daily Dena Monterroso MD        dextrose 10% bolus 125 mL 125 mL  12.5 g Intravenous PRN Dena Monterroso MD        dextrose 10% bolus 250 mL 250 mL  25 g Intravenous PRN Dena Monterroso MD        DOBUtamine 1000 mg in D5W 250 mL infusion  5 mcg/kg/min Intravenous Continuous Dena Monterroso MD 6.1 mL/hr at 05/05/24 0343 5 mcg/kg/min at 05/05/24 0343    famotidine tablet 20 mg  20 mg Oral Daily Dena Monterroso MD        fluticasone propionate 50 mcg/actuation nasal spray 100 mcg  2 spray Each Nostril Daily Dena Monterroso MD        furosemide injection 80 mg  80 mg Intravenous Once Dena Monterroso MD        glucagon (human recombinant) injection 1 mg  1 mg Intramuscular PRN Dena Monterroso MD        glucose chewable tablet 16 g  16 g Oral PRN Dena Monterroso MD        glucose chewable tablet 24 g  24 g Oral PRN Dena Monterroso MD        insulin aspart U-100 pen 0-5 Units  0-5 Units Subcutaneous QID (AC + HS) PRN Dena Monterroso MD        magnesium sulfate 2g in water 50mL IVPB (premix)  2 g Intravenous Once Dena Monterroso MD 25 mL/hr at 05/05/24 0348 2 g at 05/05/24 0348    ondansetron disintegrating tablet 8 mg  8 mg Oral Q8H PRN Dena Monterroso MD        oseltamivir capsule 30 mg  30 mg Oral Daily Dena Monterroso MD        pregabalin capsule 100 mg  100 mg Oral QHS Dena Monterroso MD   100 mg at 05/05/24 0329    promethazine-codeine 6.25-10 mg/5 ml syrup 5 mL  5 mL Oral Q4H PRN Dena Monterroso MD   5 mL at 05/05/24 0329    sodium chloride 0.9% flush 10 mL  10 mL Intravenous PRN Juana Beatty MD        sodium chloride 0.9% flush 10 mL  10 mL  Intravenous Q6H Charbel oCrdova MD        And    sodium chloride 0.9% flush 10 mL  10 mL Intravenous PRN Charbel Cordova MD        tiotropium bromide 2.5 mcg/actuation inhaler 2 puff  2 puff Inhalation Daily Dena Monterroso MD         Facility-Administered Medications Ordered in Other Encounters   Medication Dose Route Frequency Provider Last Rate Last Admin    0.9%  NaCl infusion   Intravenous Continuous Corinna Hayes NP   New Bag at 05/23/19 0745    vancomycin in dextrose 5 % 1 gram/250 mL IVPB 1,000 mg  1,000 mg Intravenous On Call Procedure Corinna Hayes NP   1,000 mg at 05/23/19 0736     Family History       Problem Relation (Age of Onset)    Cancer Mother    Cirrhosis Brother    Diabetes Brother    Heart attack Father    Heart disease Father, Sister, Brother, Sister, Brother    Hypertension Father, Brother          Tobacco Use    Smoking status: Never    Smokeless tobacco: Never   Substance and Sexual Activity    Alcohol use: Not Currently     Comment: up to 1 yr ago drank 2-3 drinks on occasion but sporadic    Drug use: No    Sexual activity: Yes     Partners: Male     Review of Systems   Constitutional: Positive for malaise/fatigue. Negative for chills, diaphoresis and night sweats.   HENT:  Positive for sore throat.    Cardiovascular:  Negative for chest pain, dyspnea on exertion, leg swelling, near-syncope and palpitations.   Respiratory:  Positive for cough and shortness of breath. Negative for wheezing.    Skin:  Negative for color change and dry skin.   Musculoskeletal:  Negative for joint pain and joint swelling.   Gastrointestinal:  Negative for abdominal pain, diarrhea, nausea and vomiting.   Genitourinary:  Negative for dysuria.   Neurological:  Positive for dizziness and weakness. Negative for headaches and light-headedness.   All other systems reviewed and are negative.    Objective:     Vital Signs (Most Recent):  Temp: 98.2 °F (36.8 °C) (05/05/24  0430)  Pulse: 102 (05/05/24 0430)  Resp: 18 (05/05/24 0430)  BP: (!) 126/90 (05/05/24 0430)  SpO2: 97 % (05/05/24 0430) Vital Signs (24h Range):  Temp:  [98.2 °F (36.8 °C)-98.8 °F (37.1 °C)] 98.2 °F (36.8 °C)  Pulse:  [] 102  Resp:  [17-22] 18  SpO2:  [95 %-99 %] 97 %  BP: (118-152)/(69-90) 126/90     Weight: 80.9 kg (178 lb 5.6 oz)  Body mass index is 28.79 kg/m².    SpO2: 97 %         Intake/Output Summary (Last 24 hours) at 5/5/2024 0438  Last data filed at 5/5/2024 0220  Gross per 24 hour   Intake --   Output 100 ml   Net -100 ml       Lines/Drains/Airways       Peripherally Inserted Central Catheter Line  Duration             PICC Double Lumen 05/12/23 1534 right brachial 358 days              Peripheral Intravenous Line  Duration                  Peripheral IV - Single Lumen 05/04/24 1807 20 G Anterior;Left Forearm <1 day                     Physical Exam  Constitutional:       Appearance: Normal appearance.   Eyes:      Pupils: Pupils are equal, round, and reactive to light.   Cardiovascular:      Rate and Rhythm: Normal rate and regular rhythm.      Pulses: Normal pulses.   Pulmonary:      Effort: Pulmonary effort is normal.      Breath sounds: Rales present.   Abdominal:      General: Abdomen is flat.      Palpations: Abdomen is soft.   Musculoskeletal:      Cervical back: Normal range of motion.      Right lower leg: No edema.      Left lower leg: No edema.   Neurological:      General: No focal deficit present.      Mental Status: She is alert and oriented to person, place, and time.   Psychiatric:         Mood and Affect: Mood normal.         Behavior: Behavior normal.          Significant Labs: All pertinent lab results from the last 24 hours have been reviewed.      Assessment and Plan:     Acute on chronic combined systolic and diastolic heart failure  Acute decompensated heart failure 2/2 niCM (EF 15%) Stage D, NYHA III  Not a candidate for advanced options, on home palliative   Presented to  the hospital with decompensated heart failure in the setting of influenza     Give 80mg IV of lasix and reassess in the morning for pushes vs ggt  Cont home   Entresto was discontinued recently by PCP, unknown reason  Jardiance 25mg at home, 10mg inpatient   Strict I&O  Daily wt  TTE in AM       Influenza A  Patient with increased fatigue and sore throat. Chronic cough   -Continue home promethazine-codeine prn   -Tamiflu for 5 days 30mg daily, per GFR   -procal negative, no antibiotics for now     COPD exacerbation  Patient's COPD is controlled currently. Continue scheduled home inhalers and monitor respiratory status closely.   -Unsure if patient was started on steroids for COPD exacerbation  -Prednisone 40mg daily will treat for 5 days, patient took dose yesterday     Paroxysmal A-fib  Patient with Paroxysmal (<7 days) atrial fibrillation/AFL which is controlled currently with Amiodarone. Patient is currently in sinus rhythm.[id number] Score: 3. Anticoagulation indicated. Anticoagulation done with Eliquis .  -Continue home amiodarone  -Continue home eliquis    Gout, arthritis  Continue allopurinol       CKD (chronic kidney disease), stage IV  Creatine stable for now. BMP reviewed- noted Estimated Creatinine Clearance: 25.3 mL/min (A) (based on SCr of 2.6 mg/dL (H)). according to latest data. Based on current GFR, CKD stage is stage 4 - GFR 15-29.  Monitor UOP and serial BMP and adjust therapy as needed. Renally dose meds. Avoid nephrotoxic medications and procedures.  Bl creatinine 2.2-2.6  Currently within bl range    Essential hypertension  Chronic, controlled.  -Continue home bidil         Microcytic anemia  Patient with chronic anemia secondary to CKD   -stable         VTE Risk Mitigation (From admission, onward)           Ordered     apixaban tablet 2.5 mg  2 times daily         05/05/24 0310     IP VTE HIGH RISK PATIENT  Once         05/05/24 0204     Place sequential compression device  Until  discontinued         05/05/24 0204                    Dena Robbins MD  Cardiology   [facility] - Transplant Stepdown

## 2024-05-05 NOTE — ASSESSMENT & PLAN NOTE
Creatine stable for now. BMP reviewed- noted Estimated Creatinine Clearance: 25.3 mL/min (A) (based on SCr of 2.6 mg/dL (H)). according to latest data. Based on current GFR, CKD stage is stage 4 - GFR 15-29.  Monitor UOP and serial BMP and adjust therapy as needed. Renally dose meds. Avoid nephrotoxic medications and procedures.  Bl creatinine 2.2-2.6  Currently within bl range

## 2024-05-05 NOTE — NURSING
Nurses Note -- 4 Eyes      5/5/2024   9:11 AM      Skin assessed during: Admit      [x] No Altered Skin Integrity Present    [x]Prevention Measures Documented      [] Yes- Altered Skin Integrity Present or Discovered   [] LDA Added if Not in Epic (Describe Wound)   [] New Altered Skin Integrity was Present on Admit and Documented in LDA   [] Wound Image Taken    Wound Care Consulted? No    Attending Nurse:  Vikas Okeefe RN/Staff Member:  LAURIE Danielle

## 2024-05-05 NOTE — PLAN OF CARE
AAOx4. VSS. HR 90s-110s on telemetry. Droplet isolation precautions maintained throughout shift for (+) flu. PO tamiflu started daily x5days. Patient up OOB independently to BSC. IV lasix 80mg administered o/n. Patient voiding clear yellow urine in hat in BSC - see flowsheet for exact UOP amount. Lasix 80mg IVP administered this evening at 1815. Cr 2.7 (increased from 2.6 prior). Lactic acid 1.1 (increased from 1.0 prior). BMP rechecked at 1300 - no changes noted from BMP drawn w/ AM labs. Emesis x1 this AM - PRN PO zofran administered w/ adequate relief. CXR done 5/5 - showed no changes compared to prior exams. Echo ordered 5/5 - awaiting completion. Blood cultures 5/4 - NGTD. Dobutamine gtt infusing continuously via R UA double lumen PICC @ 5mcg/kg/min (6.1cc/hr), DW 81.8kg. BG 120s-180s this shift. Non-skid socks on. Bed in low position. Call light within reach. Possible discharge tomorrow 5/6.

## 2024-05-05 NOTE — ASSESSMENT & PLAN NOTE
Acute decompensated heart failure 2/2 niCM (EF 15%) Stage D, NYHA III  Not a candidate for advanced options, on home palliative   Presented to the hospital with decompensated heart failure in the setting of influenza     Give 80mg IV of lasix and reassess in the morning for pushes vs ggt  Cont home   Entresto was discontinued recently by PCP, unknown reason  Jardiance 25mg at home, 10mg inpatient   Strict I&O  Daily wt  TTE in AM

## 2024-05-05 NOTE — PLAN OF CARE
- Patient arrived to TSU room 37521 this morning ~0130, brought by EMS from [facility].  - Patient presents with hyperglycemia (treated at previous hospital), chest pain, and malaise.  - Patient is positive for the flu - droplet precautions initiated.  - Dr. Tomi Robbins notified of patient's arrival - Dr. Tomi Robbins came to bedside, assessed patient, and entered orders.  - Notified Dr. Tomi Robbins that patient had 25 beats of V-tach on cardiac monitor while sleeping; magnesium ordered to be replaced IVPB.  - Patient arrived with her home Dobutamine infusing to her PICC line. Per orders, patient was started on hospital dobutamine drip and she disconnected her ambulatory pump.  - Dobutamine is at 5 mcg/kg/min; dosing weight 81.8 kg.  - Labs drawn; chest X-ray taken; IV Lasix given.  - Patient is ambulatory. Patient denies pain. Patient is on room air. PRN cough syrup administered.

## 2024-05-06 LAB
ACANTHOCYTES BLD QL SMEAR: PRESENT
ALBUMIN SERPL BCP-MCNC: 3.6 G/DL (ref 3.5–5.2)
ALP SERPL-CCNC: 40 U/L (ref 55–135)
ALT SERPL W/O P-5'-P-CCNC: 14 U/L (ref 10–44)
ANION GAP SERPL CALC-SCNC: 11 MMOL/L (ref 8–16)
ANISOCYTOSIS BLD QL SMEAR: ABNORMAL
ANISOCYTOSIS BLD QL SMEAR: SLIGHT
APTT PPP: 27.3 SEC (ref 21–32)
APTT PPP: 28.3 SEC (ref 21–32)
APTT PPP: >150 SEC (ref 21–32)
AST SERPL-CCNC: 15 U/L (ref 10–40)
BASO STIPL BLD QL SMEAR: ABNORMAL
BASOPHILS # BLD AUTO: 0.02 K/UL (ref 0–0.2)
BASOPHILS # BLD AUTO: 0.03 K/UL (ref 0–0.2)
BASOPHILS NFR BLD: 0.3 % (ref 0–1.9)
BASOPHILS NFR BLD: 0.3 % (ref 0–1.9)
BILIRUB SERPL-MCNC: 1.5 MG/DL (ref 0.1–1)
BUN SERPL-MCNC: 83 MG/DL (ref 6–20)
BURR CELLS BLD QL SMEAR: ABNORMAL
CALCIUM SERPL-MCNC: 9.2 MG/DL (ref 8.7–10.5)
CHLORIDE SERPL-SCNC: 98 MMOL/L (ref 95–110)
CO2 SERPL-SCNC: 26 MMOL/L (ref 23–29)
CREAT SERPL-MCNC: 3.3 MG/DL (ref 0.5–1.4)
DACRYOCYTES BLD QL SMEAR: ABNORMAL
DACRYOCYTES BLD QL SMEAR: ABNORMAL
DIFFERENTIAL METHOD BLD: ABNORMAL
DIFFERENTIAL METHOD BLD: ABNORMAL
EOSINOPHIL # BLD AUTO: 0.1 K/UL (ref 0–0.5)
EOSINOPHIL # BLD AUTO: 0.1 K/UL (ref 0–0.5)
EOSINOPHIL NFR BLD: 0.7 % (ref 0–8)
EOSINOPHIL NFR BLD: 1.1 % (ref 0–8)
ERYTHROCYTE [DISTWIDTH] IN BLOOD BY AUTOMATED COUNT: 28.5 % (ref 11.5–14.5)
ERYTHROCYTE [DISTWIDTH] IN BLOOD BY AUTOMATED COUNT: 28.7 % (ref 11.5–14.5)
EST. GFR  (NO RACE VARIABLE): 15.6 ML/MIN/1.73 M^2
GIANT PLATELETS BLD QL SMEAR: PRESENT
GLUCOSE SERPL-MCNC: 131 MG/DL (ref 70–110)
HCT VFR BLD AUTO: 32.4 % (ref 37–48.5)
HCT VFR BLD AUTO: 34 % (ref 37–48.5)
HGB BLD-MCNC: 9.3 G/DL (ref 12–16)
HGB BLD-MCNC: 9.9 G/DL (ref 12–16)
HYPOCHROMIA BLD QL SMEAR: ABNORMAL
IMM GRANULOCYTES # BLD AUTO: 0.04 K/UL (ref 0–0.04)
IMM GRANULOCYTES # BLD AUTO: 0.06 K/UL (ref 0–0.04)
IMM GRANULOCYTES NFR BLD AUTO: 0.6 % (ref 0–0.5)
IMM GRANULOCYTES NFR BLD AUTO: 0.7 % (ref 0–0.5)
INR PPP: 1 (ref 0.8–1.2)
LYMPHOCYTES # BLD AUTO: 0.4 K/UL (ref 1–4.8)
LYMPHOCYTES # BLD AUTO: 1.1 K/UL (ref 1–4.8)
LYMPHOCYTES NFR BLD: 15.5 % (ref 18–48)
LYMPHOCYTES NFR BLD: 4.3 % (ref 18–48)
MAGNESIUM SERPL-MCNC: 2.2 MG/DL (ref 1.6–2.6)
MCH RBC QN AUTO: 18.6 PG (ref 27–31)
MCH RBC QN AUTO: 18.7 PG (ref 27–31)
MCHC RBC AUTO-ENTMCNC: 28.7 G/DL (ref 32–36)
MCHC RBC AUTO-ENTMCNC: 29.1 G/DL (ref 32–36)
MCV RBC AUTO: 64 FL (ref 82–98)
MCV RBC AUTO: 65 FL (ref 82–98)
MONOCYTES # BLD AUTO: 0.1 K/UL (ref 0.3–1)
MONOCYTES # BLD AUTO: 0.7 K/UL (ref 0.3–1)
MONOCYTES NFR BLD: 1.5 % (ref 4–15)
MONOCYTES NFR BLD: 9.3 % (ref 4–15)
NEUTROPHILS # BLD AUTO: 5.3 K/UL (ref 1.8–7.7)
NEUTROPHILS # BLD AUTO: 7.9 K/UL (ref 1.8–7.7)
NEUTROPHILS NFR BLD: 73.2 % (ref 38–73)
NEUTROPHILS NFR BLD: 92.5 % (ref 38–73)
NRBC BLD-RTO: 1 /100 WBC
NRBC BLD-RTO: 2 /100 WBC
OVALOCYTES BLD QL SMEAR: ABNORMAL
OVALOCYTES BLD QL SMEAR: ABNORMAL
PHOSPHATE SERPL-MCNC: 3.9 MG/DL (ref 2.7–4.5)
PLATELET # BLD AUTO: 153 K/UL (ref 150–450)
PLATELET # BLD AUTO: 165 K/UL (ref 150–450)
PLATELET BLD QL SMEAR: ABNORMAL
PMV BLD AUTO: ABNORMAL FL (ref 9.2–12.9)
PMV BLD AUTO: ABNORMAL FL (ref 9.2–12.9)
POCT GLUCOSE: 125 MG/DL (ref 70–110)
POCT GLUCOSE: 212 MG/DL (ref 70–110)
POCT GLUCOSE: 310 MG/DL (ref 70–110)
POIKILOCYTOSIS BLD QL SMEAR: ABNORMAL
POIKILOCYTOSIS BLD QL SMEAR: ABNORMAL
POLYCHROMASIA BLD QL SMEAR: ABNORMAL
POLYCHROMASIA BLD QL SMEAR: ABNORMAL
POTASSIUM SERPL-SCNC: 4.5 MMOL/L (ref 3.5–5.1)
PROT SERPL-MCNC: 6.3 G/DL (ref 6–8.4)
PROTHROMBIN TIME: 10.9 SEC (ref 9–12.5)
RBC # BLD AUTO: 5.01 M/UL (ref 4–5.4)
RBC # BLD AUTO: 5.3 M/UL (ref 4–5.4)
SCHISTOCYTES BLD QL SMEAR: ABNORMAL
SCHISTOCYTES BLD QL SMEAR: ABNORMAL
SCHISTOCYTES BLD QL SMEAR: PRESENT
SODIUM SERPL-SCNC: 135 MMOL/L (ref 136–145)
SPHEROCYTES BLD QL SMEAR: ABNORMAL
TARGETS BLD QL SMEAR: ABNORMAL
WBC # BLD AUTO: 7.24 K/UL (ref 3.9–12.7)
WBC # BLD AUTO: 8.58 K/UL (ref 3.9–12.7)

## 2024-05-06 PROCEDURE — 20600001 HC STEP DOWN PRIVATE ROOM

## 2024-05-06 PROCEDURE — 63600175 PHARM REV CODE 636 W HCPCS: Performed by: STUDENT IN AN ORGANIZED HEALTH CARE EDUCATION/TRAINING PROGRAM

## 2024-05-06 PROCEDURE — 83735 ASSAY OF MAGNESIUM: CPT | Performed by: STUDENT IN AN ORGANIZED HEALTH CARE EDUCATION/TRAINING PROGRAM

## 2024-05-06 PROCEDURE — 36415 COLL VENOUS BLD VENIPUNCTURE: CPT | Performed by: INTERNAL MEDICINE

## 2024-05-06 PROCEDURE — 85730 THROMBOPLASTIN TIME PARTIAL: CPT | Mod: 91 | Performed by: INTERNAL MEDICINE

## 2024-05-06 PROCEDURE — 94761 N-INVAS EAR/PLS OXIMETRY MLT: CPT

## 2024-05-06 PROCEDURE — 63600175 PHARM REV CODE 636 W HCPCS

## 2024-05-06 PROCEDURE — 85025 COMPLETE CBC W/AUTO DIFF WBC: CPT | Mod: 91

## 2024-05-06 PROCEDURE — 25000003 PHARM REV CODE 250: Performed by: STUDENT IN AN ORGANIZED HEALTH CARE EDUCATION/TRAINING PROGRAM

## 2024-05-06 PROCEDURE — 99233 SBSQ HOSP IP/OBS HIGH 50: CPT | Mod: ,,, | Performed by: INTERNAL MEDICINE

## 2024-05-06 PROCEDURE — 94640 AIRWAY INHALATION TREATMENT: CPT

## 2024-05-06 PROCEDURE — 80053 COMPREHEN METABOLIC PANEL: CPT | Performed by: STUDENT IN AN ORGANIZED HEALTH CARE EDUCATION/TRAINING PROGRAM

## 2024-05-06 PROCEDURE — 85730 THROMBOPLASTIN TIME PARTIAL: CPT

## 2024-05-06 PROCEDURE — 25000242 PHARM REV CODE 250 ALT 637 W/ HCPCS: Performed by: STUDENT IN AN ORGANIZED HEALTH CARE EDUCATION/TRAINING PROGRAM

## 2024-05-06 PROCEDURE — A4216 STERILE WATER/SALINE, 10 ML: HCPCS | Performed by: INTERNAL MEDICINE

## 2024-05-06 PROCEDURE — 85025 COMPLETE CBC W/AUTO DIFF WBC: CPT | Performed by: STUDENT IN AN ORGANIZED HEALTH CARE EDUCATION/TRAINING PROGRAM

## 2024-05-06 PROCEDURE — 25000003 PHARM REV CODE 250: Performed by: INTERNAL MEDICINE

## 2024-05-06 PROCEDURE — 84100 ASSAY OF PHOSPHORUS: CPT | Performed by: STUDENT IN AN ORGANIZED HEALTH CARE EDUCATION/TRAINING PROGRAM

## 2024-05-06 PROCEDURE — 27000207 HC ISOLATION

## 2024-05-06 PROCEDURE — 85610 PROTHROMBIN TIME: CPT

## 2024-05-06 RX ORDER — FUROSEMIDE 10 MG/ML
80 INJECTION INTRAMUSCULAR; INTRAVENOUS EVERY 12 HOURS
Status: DISCONTINUED | OUTPATIENT
Start: 2024-05-06 | End: 2024-05-07

## 2024-05-06 RX ORDER — HEPARIN SODIUM,PORCINE/D5W 25000/250
0-40 INTRAVENOUS SOLUTION INTRAVENOUS CONTINUOUS
Status: DISCONTINUED | OUTPATIENT
Start: 2024-05-06 | End: 2024-05-08 | Stop reason: HOSPADM

## 2024-05-06 RX ADMIN — HYDRALAZINE HYDROCHLORIDE: 10 TABLET, FILM COATED ORAL at 04:05

## 2024-05-06 RX ADMIN — AMIODARONE HYDROCHLORIDE 200 MG: 200 TABLET ORAL at 08:05

## 2024-05-06 RX ADMIN — HYDRALAZINE HYDROCHLORIDE: 10 TABLET, FILM COATED ORAL at 08:05

## 2024-05-06 RX ADMIN — TIOTROPIUM BROMIDE INHALATION SPRAY 2 PUFF: 3.12 SPRAY, METERED RESPIRATORY (INHALATION) at 08:05

## 2024-05-06 RX ADMIN — ASPIRIN 81 MG: 81 TABLET, COATED ORAL at 09:05

## 2024-05-06 RX ADMIN — ALLOPURINOL 300 MG: 100 TABLET ORAL at 08:05

## 2024-05-06 RX ADMIN — CETIRIZINE HYDROCHLORIDE 10 MG: 10 TABLET, FILM COATED ORAL at 09:05

## 2024-05-06 RX ADMIN — PROMETHAZINE HYDROCHLORIDE AND CODEINE PHOSPHATE 5 ML: 6.25; 1 SOLUTION ORAL at 08:05

## 2024-05-06 RX ADMIN — ATORVASTATIN CALCIUM 40 MG: 40 TABLET, FILM COATED ORAL at 08:05

## 2024-05-06 RX ADMIN — PREGABALIN 100 MG: 50 CAPSULE ORAL at 08:05

## 2024-05-06 RX ADMIN — HEPARIN SODIUM AND DEXTROSE 12 UNITS/KG/HR: 10000; 5 INJECTION INTRAVENOUS at 01:05

## 2024-05-06 RX ADMIN — EMPAGLIFLOZIN 10 MG: 10 TABLET, FILM COATED ORAL at 08:05

## 2024-05-06 RX ADMIN — FUROSEMIDE 80 MG: 10 INJECTION, SOLUTION INTRAMUSCULAR; INTRAVENOUS at 08:05

## 2024-05-06 RX ADMIN — INSULIN ASPART 1 UNITS: 100 INJECTION, SOLUTION INTRAVENOUS; SUBCUTANEOUS at 08:05

## 2024-05-06 RX ADMIN — PREDNISONE 40 MG: 20 TABLET ORAL at 08:05

## 2024-05-06 RX ADMIN — OSELTAMIVIR PHOSPHATE 30 MG: 30 CAPSULE ORAL at 09:05

## 2024-05-06 RX ADMIN — Medication 10 ML: at 05:05

## 2024-05-06 RX ADMIN — DOBUTAMINE HYDROCHLORIDE 5 MCG/KG/MIN: 400 INJECTION INTRAVENOUS at 05:05

## 2024-05-06 RX ADMIN — APIXABAN 2.5 MG: 2.5 TABLET, FILM COATED ORAL at 09:05

## 2024-05-06 RX ADMIN — FAMOTIDINE 20 MG: 20 TABLET ORAL at 09:05

## 2024-05-06 RX ADMIN — FLUTICASONE PROPIONATE 100 MCG: 50 SPRAY, METERED NASAL at 09:05

## 2024-05-06 RX ADMIN — FUROSEMIDE 80 MG: 10 INJECTION, SOLUTION INTRAMUSCULAR; INTRAVENOUS at 01:05

## 2024-05-06 RX ADMIN — INSULIN ASPART 4 UNITS: 100 INJECTION, SOLUTION INTRAVENOUS; SUBCUTANEOUS at 03:05

## 2024-05-06 NOTE — HOSPITAL COURSE
Patient was admitted to Hospital Medicine for medical management and evaluation of ADHF in the setting of influenzae infection. Patient responded well to initial diuresis, will transition to increased dose of home oral diuretic regimen. Oral anticoagulation held while inpatient pending any possible procedural intervention and was replaced with heparin gtt. Patient displayed intermittent, self-resolved runs of ventricular arrhythmia while inpatient with associated symptomatic palpitations; defibrillator in place and on VT dosed amiodarone. TTE 5/6/24 with stable EF of 15-20%, G2 diastolic dysfunction, severe LV increased mass and eccentric hypertrophy with continued severe global hypokinesis. Patient euvolemic prior to discharge. Patient experienced continued hyperglycemia throughout hospitalization requiring long acting and sliding scale insulin. Discontinuing steroid taper and discharging with instruction to return to outpatient glycemic controllers. Upon chart review, patient without major contraindication to full dose eliquis and does not fully meet criteria for dose reduction. Dose increase to be discussed at follow up. Patient also had entresto discontinued outpatient; will defer to outpatient follow-up for re-initiation of entresto for completion of full GDMT regimen.    Discharging with instructions to take an increased evening dose of bumex (4mg) if patient notices increased swelling, primarily abdominal, or patient gains 3 lbs in 1 day or 5 lbs in 1 week. Otherwise, continuing home diuretics with bumex 4mg qam/2mg qpm and metolazone 5mg QD. Transitioned off heparin gtt at discharge with patient instructed to resume prior dosing of eliquis. Will discharge with 1 day of oseltamivir to complete 5 day regimen. Will also coordinate continued follow-up with infusion care for continuous dobutamine infusion. All patient questions were answered, and patient expressed understanding.

## 2024-05-06 NOTE — PLAN OF CARE
Recommendations     1. Continue diabetic, cardiac 2000 kcal diet as tolerated.   2. Add commercial beverages (Boost Breeze) TID.   3. RD to monitor and follow-up     Goals: Pt to tolerate greater than 75% of intake by RD follow-up     Nutrition Goal Status: new     Communication of RD Recs:  (POC)

## 2024-05-06 NOTE — ASSESSMENT & PLAN NOTE
Patient's COPD is controlled currently. Continue scheduled home inhalers and monitor respiratory status closely.   -Unsure if patient was started on steroids for COPD exacerbation  -Prednisone 40mg daily will treat for 5 days (day 3/5)

## 2024-05-06 NOTE — ASSESSMENT & PLAN NOTE
Creatine stable for now. BMP reviewed- noted Estimated Creatinine Clearance: 20 mL/min (A) (based on SCr of 3.3 mg/dL (H)). according to latest data. Based on current GFR, CKD stage is stage 4 - GFR 15-29.  Monitor UOP and serial BMP and adjust therapy as needed. Renally dose meds. Avoid nephrotoxic medications and procedures.  Bl creatinine 2.2-2.6    Cr uptrend to 3.3 today from 2.7. Considering significant BNP elevation to 3051 on admission, may be secondary to cardiorenal syndrome. Will continue diuresis after -928cc urine output yesterday.

## 2024-05-06 NOTE — SUBJECTIVE & OBJECTIVE
Interval History: Patient developed L jaw pain overnight and complained of scratchy throat pain. Telemetry captured short run of Vtach that resolved without intervention. BP 99//84, last 119/75, and HR 89-97 and last 97. Adding heparin gtt while dc'ing apixaban in the case a procedural intervention is required. In 1176cc charted, Out 2105 UOP.     Review of Systems   Constitutional: Positive for malaise/fatigue. Negative for chills, diaphoresis and night sweats.   HENT:  Positive for sore throat.    Cardiovascular:  Negative for chest pain, dyspnea on exertion, leg swelling, near-syncope and palpitations.   Respiratory:  Positive for cough and shortness of breath. Negative for wheezing.    Skin:  Negative for color change and dry skin.   Musculoskeletal:  Positive for myalgias. Negative for joint pain and joint swelling.   Gastrointestinal:  Negative for abdominal pain, diarrhea, nausea and vomiting.   Genitourinary:  Negative for dysuria.   Neurological:  Positive for dizziness and weakness. Negative for headaches and light-headedness.   All other systems reviewed and are negative.    Objective:     Vital Signs (Most Recent):  Temp: 98.1 °F (36.7 °C) (05/06/24 1208)  Pulse: 89 (05/06/24 1208)  Resp: 19 (05/06/24 1208)  BP: 118/74 (05/06/24 1208)  SpO2: 95 % (05/06/24 1208) Vital Signs (24h Range):  Temp:  [98 °F (36.7 °C)-98.7 °F (37.1 °C)] 98.1 °F (36.7 °C)  Pulse:  [] 89  Resp:  [18-19] 19  SpO2:  [92 %-95 %] 95 %  BP: ()/(66-84) 118/74     Weight: 81.5 kg (179 lb 10.8 oz)  Body mass index is 29 kg/m².     SpO2: 95 %         Intake/Output Summary (Last 24 hours) at 5/6/2024 1313  Last data filed at 5/6/2024 0915  Gross per 24 hour   Intake 816.21 ml   Output 1605 ml   Net -788.79 ml       Lines/Drains/Airways       Peripherally Inserted Central Catheter Line  Duration             PICC Double Lumen 05/12/23 1534 right brachial 359 days              Peripheral Intravenous Line  Duration                   Peripheral IV - Single Lumen 05/04/24 1807 20 G Anterior;Left Forearm 1 day                       Physical Exam  Vitals and nursing note reviewed.   Constitutional:       General: She is not in acute distress.     Appearance: Normal appearance. She is not ill-appearing.   HENT:      Mouth/Throat:      Mouth: Mucous membranes are moist.      Pharynx: Oropharynx is clear.   Eyes:      General: No scleral icterus.     Pupils: Pupils are equal, round, and reactive to light.   Cardiovascular:      Rate and Rhythm: Normal rate and regular rhythm.      Pulses: Normal pulses.   Pulmonary:      Effort: Pulmonary effort is normal.      Breath sounds: Rales (bibasilar) present.   Abdominal:      General: Abdomen is flat. There is distension.      Palpations: Abdomen is soft.      Tenderness: There is no abdominal tenderness.   Musculoskeletal:      Cervical back: Normal range of motion.      Right lower leg: No edema.      Left lower leg: No edema.   Skin:     General: Skin is warm and dry.   Neurological:      General: No focal deficit present.      Mental Status: She is alert and oriented to person, place, and time.   Psychiatric:         Mood and Affect: Mood normal.         Behavior: Behavior normal.          Significant Labs: CMP   Recent Labs   Lab 05/05/24 0220 05/05/24 0640 05/05/24  1310 05/06/24 0222    137 137 135*   K 5.2* 4.5 4.9 4.5    103 101 98   CO2 24 24 24 26   * 166* 189* 131*   BUN 73* 72* 72* 83*   CREATININE 2.6* 2.7* 2.7* 3.3*   CALCIUM 9.8 9.6 9.5 9.2   PROT 6.5 6.3  --  6.3   ALBUMIN 3.8 3.7  --  3.6   BILITOT 1.7* 1.7*  --  1.5*   ALKPHOS 42* 41*  --  40*   AST 16 15  --  15   ALT 17 15  --  14   ANIONGAP 10 10 12 11   , CBC   Recent Labs   Lab 05/05/24 0220 05/05/24  0640 05/06/24 0222   WBC 5.28 5.97 7.24   HGB 10.0* 9.9* 9.3*   HCT 33.4* 33.0* 32.4*   * 132* 153     Significant Imaging: All significant imaging from the last 24 hours has been reviewed.    TTE  pending 5/6/24

## 2024-05-06 NOTE — PROGRESS NOTES
[facility]  Cardiology  Progress Note    Patient Name: Hafsa Hawley  MRN: [id number]  Admission Date: 5/5/2024  Hospital Length of Stay: 1 days  Code Status: Full Code   Attending Physician: Charbel Cordova MD   Primary Care Physician: Cristobal Ann MD  Expected Discharge Date: 5/6/2024  Principal Problem:<principal problem not specified>    Subjective:     Hospital Course:   Patient was admitted to Hospital Medicine for medical management and evaluation of ADHF in the setting of influenzae infection. Patient responded well to initial diuresis, will continue. Patient displayed intermittent, short-lived runs of ventricular arrhythmia while inpatient.    Interval History: Patient developed L jaw pain overnight and complained of scratchy throat pain. Telemetry captured short run of Vtach that resolved without intervention. BP 99//84, last 119/75, and HR 89-97 and last 97. Adding heparin gtt while dc'ing apixaban in the case a procedural intervention is required. In 1176cc charted, Out 2105 UOP.     Review of Systems   Constitutional: Positive for malaise/fatigue. Negative for chills, diaphoresis and night sweats.   HENT:  Positive for sore throat.    Cardiovascular:  Negative for chest pain, dyspnea on exertion, leg swelling, near-syncope and palpitations.   Respiratory:  Positive for cough and shortness of breath. Negative for wheezing.    Skin:  Negative for color change and dry skin.   Musculoskeletal:  Positive for myalgias. Negative for joint pain and joint swelling.   Gastrointestinal:  Negative for abdominal pain, diarrhea, nausea and vomiting.   Genitourinary:  Negative for dysuria.   Neurological:  Positive for dizziness and weakness. Negative for headaches and light-headedness.   All other systems reviewed and are negative.    Objective:     Vital Signs (Most Recent):  Temp: 98.1 °F (36.7 °C) (05/06/24 1208)  Pulse: 89 (05/06/24 1208)  Resp: 19 (05/06/24 1208)  BP: 118/74  (05/06/24 1208)  SpO2: 95 % (05/06/24 1208) Vital Signs (24h Range):  Temp:  [98 °F (36.7 °C)-98.7 °F (37.1 °C)] 98.1 °F (36.7 °C)  Pulse:  [] 89  Resp:  [18-19] 19  SpO2:  [92 %-95 %] 95 %  BP: ()/(66-84) 118/74     Weight: 81.5 kg (179 lb 10.8 oz)  Body mass index is 29 kg/m².     SpO2: 95 %         Intake/Output Summary (Last 24 hours) at 5/6/2024 1313  Last data filed at 5/6/2024 0915  Gross per 24 hour   Intake 816.21 ml   Output 1605 ml   Net -788.79 ml       Lines/Drains/Airways       Peripherally Inserted Central Catheter Line  Duration             PICC Double Lumen 05/12/23 1534 right brachial 359 days              Peripheral Intravenous Line  Duration                  Peripheral IV - Single Lumen 05/04/24 1807 20 G Anterior;Left Forearm 1 day                       Physical Exam  Vitals and nursing note reviewed.   Constitutional:       General: She is not in acute distress.     Appearance: Normal appearance. She is not ill-appearing.   HENT:      Mouth/Throat:      Mouth: Mucous membranes are moist.      Pharynx: Oropharynx is clear.   Eyes:      General: No scleral icterus.     Pupils: Pupils are equal, round, and reactive to light.   Cardiovascular:      Rate and Rhythm: Normal rate and regular rhythm.      Pulses: Normal pulses.   Pulmonary:      Effort: Pulmonary effort is normal.      Breath sounds: Rales (bibasilar) present.   Abdominal:      General: Abdomen is flat. There is distension.      Palpations: Abdomen is soft.      Tenderness: There is no abdominal tenderness.   Musculoskeletal:      Cervical back: Normal range of motion.      Right lower leg: No edema.      Left lower leg: No edema.   Skin:     General: Skin is warm and dry.   Neurological:      General: No focal deficit present.      Mental Status: She is alert and oriented to person, place, and time.   Psychiatric:         Mood and Affect: Mood normal.         Behavior: Behavior normal.          Significant Labs: CMP    Recent Labs   Lab 05/05/24 0220 05/05/24 0640 05/05/24  1310 05/06/24 0222    137 137 135*   K 5.2* 4.5 4.9 4.5    103 101 98   CO2 24 24 24 26   * 166* 189* 131*   BUN 73* 72* 72* 83*   CREATININE 2.6* 2.7* 2.7* 3.3*   CALCIUM 9.8 9.6 9.5 9.2   PROT 6.5 6.3  --  6.3   ALBUMIN 3.8 3.7  --  3.6   BILITOT 1.7* 1.7*  --  1.5*   ALKPHOS 42* 41*  --  40*   AST 16 15  --  15   ALT 17 15  --  14   ANIONGAP 10 10 12 11   , CBC   Recent Labs   Lab 05/05/24 0220 05/05/24 0640 05/06/24 0222   WBC 5.28 5.97 7.24   HGB 10.0* 9.9* 9.3*   HCT 33.4* 33.0* 32.4*   * 132* 153     Significant Imaging: All significant imaging from the last 24 hours has been reviewed.    TTE pending 5/6/24  Assessment and Plan:     Influenza A  Patient with increased fatigue and sore throat. Chronic cough.  Procal negative, no antibiotics for now     -Continue home promethazine-codeine prn   -Tamiflu for 5 days 30mg daily, per GFR (day 2/5)        Paroxysmal A-fib  Patient with Paroxysmal (<7 days) atrial fibrillation/AFL which is controlled currently with Amiodarone. Patient is currently in sinus rhythm.[id number] Score: 3. Anticoagulation indicated. Anticoagulation done with Eliquis .  -Continue home amiodarone  -Continue home eliquis  -Will review chart for justification of dose-reduction and will increase dose if indicated    Gout, arthritis  Continue allopurinol   Will consider dose reduction if renal function declines    COPD exacerbation  Patient's COPD is controlled currently. Continue scheduled home inhalers and monitor respiratory status closely.   -Unsure if patient was started on steroids for COPD exacerbation  -Prednisone 40mg daily will treat for 5 days (day 3/5)    CKD (chronic kidney disease), stage IV  Creatine stable for now. BMP reviewed- noted Estimated Creatinine Clearance: 20 mL/min (A) (based on SCr of 3.3 mg/dL (H)). according to latest data. Based on current GFR, CKD stage is stage 4 - GFR  15-29.  Monitor UOP and serial BMP and adjust therapy as needed. Renally dose meds. Avoid nephrotoxic medications and procedures.  Bl creatinine 2.2-2.6    Cr uptrend to 3.3 today from 2.7. Considering significant BNP elevation to 3051 on admission, may be secondary to cardiorenal syndrome. Will continue diuresis after -928cc urine output yesterday.    Acute on chronic combined systolic and diastolic heart failure  Acute decompensated heart failure 2/2 niCM (EF 15%) Stage D, NYHA III  Not a candidate for advanced options, on home palliative   Presented to the hospital with decompensated heart failure in the setting of influenza  Entresto was discontinued recently by PCP, unknown reason  Responded well to total of 80mg Lasix IV x2 doses with ~1L output. Still with crackles and abdominal distension.    Diurese with Lasix 80mg IV BID  Cont home   Jardiance 25mg at home, 10mg inpatient   Strict I&O  Daily wt  F/u TTE       Essential hypertension  Chronic, controlled.  -Continue home bidil         Microcytic anemia  Patient with chronic anemia secondary to CKD   -stable         VTE Risk Mitigation (From admission, onward)           Ordered     heparin 25,000 units in dextrose 5% (100 units/ml) IV bolus from bag LOW INTENSITY nomogram - [facility]  As needed (PRN)        Question:  Heparin Infusion Adjustment (DO NOT MODIFY ANSWER)  Answer:  [email or web address]\heparin LOW INTENSITY nomogram for [facility] [id number]    05/06/24 1203     heparin 25,000 units in dextrose 5% (100 units/ml) IV bolus from bag LOW INTENSITY nomogram - [facility]  As needed (PRN)        Question:  Heparin Infusion Adjustment (DO NOT MODIFY ANSWER)  Answer:  [email or web address]\heparin LOW INTENSITY nomogram for [facility] [id number]    05/06/24 1203     heparin 25,000 units in dextrose 5% 250 mL (100 units/mL) infusion LOW INTENSITY nomogram - [facility]  Continuous        Question:  Begin at (units/kg/hr)   Answer:  12    05/06/24 1203     IP VTE HIGH RISK PATIENT  Once         05/05/24 0204     Place sequential compression device  Until discontinued         05/05/24 0204                    Barrie Mustafa MD  Cardiology  [facility] - Transplant Stepdown

## 2024-05-06 NOTE — NURSING
Notified Dr. Tomi Robbins that patient reports new, sudden, severe left jaw pain. VS and heart rhythm unchanged from prior. MD stated to ask patient whether the pain was continuing/worsening. Patient stated that the left jaw pain was subsiding but was now spreading to her throat. Chloraseptic lozenges obtained and given to patient. Patient verbalized improvement in symptoms. MD updated.

## 2024-05-06 NOTE — ASSESSMENT & PLAN NOTE
Acute decompensated heart failure 2/2 niCM (EF 15%) Stage D, NYHA III  Not a candidate for advanced options, on home palliative   Presented to the hospital with decompensated heart failure in the setting of influenza  Entresto was discontinued recently by PCP, unknown reason  Responded well to total of 80mg Lasix IV x2 doses with ~1L output. Still with crackles and abdominal distension.    Diurese with Lasix 80mg IV BID  Cont home   Jardiance 25mg at home, 10mg inpatient   Strict I&O  Daily wt  F/u TTE

## 2024-05-06 NOTE — ASSESSMENT & PLAN NOTE
Patient with Paroxysmal (<7 days) atrial fibrillation/AFL which is controlled currently with Amiodarone. Patient is currently in sinus rhythm.[id number] Score: 3. Anticoagulation indicated. Anticoagulation done with Eliquis .  -Continue home amiodarone  -Continue home eliquis  -Will review chart for justification of dose-reduction and will increase dose if indicated

## 2024-05-06 NOTE — PLAN OF CARE
Plan of care reviewed on am rounds, afrebrile, vss wbc 7  h/h stable Cr up 3.3  Tele SR-ST , room air sats high 90's. Dobut infusion continued @ home dose 5 mcg, Lasix IVP added bid, Apixiban dcd/ hep gtt started in case intervention needed during stay. Echo pending.Blood glucoses elevated (dexacom) in afternoon with glucometer reading correlating,Correction scale continued. Up to bedside commode. Cooperative with care, quiet affect.  notified of patient request for visit, Airborne precautions maintained for (+) flu results.patient instructed to call for assist out of bed.

## 2024-05-06 NOTE — PROGRESS NOTES
[facility]  Adult Nutrition  Consult Note    SUMMARY     Recommendations    1. Continue diabetic, cardiac 2000 kcal diet as tolerated.   2. Add commercial beverages (Boost Breeze) TID.   3. RD to monitor and follow-up    Goals: Pt to tolerate greater than 75% of intake by RD follow-up    Nutrition Goal Status: new    Communication of RD Recs:  (POC)    Assessment and Plan    Nutrition Problem  Inadequate oral intake    Related to (etiology):   Decreased appetite  Chewing/swallowing difficulty (jaw/throat pain)  Abdominal pain  Nausea, vomiting  Coughing due to COPD    Signs and Symptoms (as evidenced by):   Patient reports eating less than usual     Interventions (treatment strategy):  Diabetic, Cardiac diet  Commercial Beverage  Collaboration with other providers    Nutrition Diagnosis Status:   New        Malnutrition Assessment    Orbital Region (Subcutaneous Fat Loss): well nourished  Upper Arm Region (Subcutaneous Fat Loss): well nourished   [place] Region (Muscle Loss): well nourished  Clavicle Bone Region (Muscle Loss): well nourished  Clavicle and Acromion Bone Region (Muscle Loss): well nourished     Reason for Assessment    Reason For Assessment: consult    Diagnosis:  (No active principal problem)    Relevant Medical History:   Patient Active Problem List   Diagnosis    Elevated troponin    Microcytic anemia    Essential hypertension    MELISSA (obstructive sleep apnea)    NICM (nonischemic cardiomyopathy)    Adrenal mass 1 cm to 4 cm in diameter    Lumbar degenerative disc disease    Chronic combined systolic and diastolic heart failure    Left ventricular hypertrophy    Depression due to physical illness    ICD (implantable cardioverter-defibrillator) in place    Paroxysmal supraventricular tachycardia    elevated bilirubin d/t Gilbert's syndrome    Screen for colon cancer    Type 2 diabetes mellitus with stage 4 chronic kidney disease, without long-term current use of insulin     Leukopenia    Shortness of breath    DVT prophylaxis    Muscle twitch    Acute on chronic combined systolic and diastolic heart failure    Elevated serum immunoglobulin free light chains    Hypertensive cardiovascular-renal disease, stage 1-4 or unspecified chronic kidney disease, with heart failure    Restrictive lung disease    CKD (chronic kidney disease), stage IV    NSVT (nonsustained ventricular tachycardia)    Prolonged Q-T interval on ECG    Acute decompensated heart failure    Demand ischemia    Pulmonary HTN    Aortic atherosclerosis    Pulmonary hypertension    BMI 32.0-32.9,adult    Chills    COPD exacerbation    Asymptomatic bacteriuria    Palliative care encounter    ACC/AHA stage D systolic heart failure    Debility    Dyspnea    Advance care planning    Gout, arthritis    Chronic pain of right knee    Atrial enlargement, bilateral    Paroxysmal A-fib    Chest wall tenderness    Pain    Nausea and vomiting    CKD (chronic kidney disease) stage 4, GFR 15-29 ml/min    Anemia    Type 2 diabetes mellitus without complication, without long-term current use of insulin    Bursitis of left foot    Other reduced mobility    Bilateral leg weakness    H/O iron deficiency anemia    Hyperlipidemia, mixed    Lower extremity neuropathy    Pulmonary arterial hypertension    Influenza A      Interdisciplinary Rounds: did not attend    General Information Comments:   Pt with CKD 4, T2DM, and combined CHF reports no N/V/D/C today but did vomit yesterday during a meal. Per chart review, pt is % of meals yesterday and 50% of B today. Pt claims to not be able to be eating less than usual due to coughing, jaw pain, throat discomfort, and abdominal pain. Pt asked about Boost as a meal supplement, RD recommended Boost Breeze as a supplement to PO intake. No reported wt loss. Pt reported dry wt being ~78 kg, CBW 81.5 kg. NFPE performed 5/6/2024, pt is well nourished.    Nutrition Discharge planning: Diabetic, cardiac  "diet    Nutrition Risk Screen    Nutrition Risk Screen: reduced oral intake over the last month    Nutrition/Diet History    Food Allergies: NKFA  Factors Affecting Nutritional Intake: abdominal pain, decreased appetite, nausea/vomiting, difficulty/impaired swallowing    Anthropometrics    Temp: 98.1 °F (36.7 °C)  Height Method: Stated  Height: 5' 6" (167.6 cm)  Height (inches): 66 in  Weight Method: Bed Scale  Weight: 81.5 kg (179 lb 10.8 oz)  Weight (lb): 179.68 lb  Ideal Body Weight (IBW), Female: 130 lb  % Ideal Body Weight, Female (lb): 138.22 %  BMI (Calculated): 29  BMI Grade: 25 - 29.9 - overweight     Lab/Procedures/Meds    Pertinent Labs Reviewed: reviewed  Pertinent Labs Comments: CBC:  Recent Labs   Lab 05/06/24 0222   WBC 7.24   HGB 9.3*   HCT 32.4*        CMP:  Recent Labs   Lab 05/06/24 0222   CALCIUM 9.2   ALBUMIN 3.6   PROT 6.3   *   K 4.5   CO2 26   CL 98   BUN 83*   CREATININE 3.3*   ALKPHOS 40*   ALT 14   AST 15   BILITOT 1.5*     BMP:   Recent Labs   Lab 05/06/24 0222   *   *   K 4.5   CL 98   CO2 26   BUN 83*   CREATININE 3.3*   CALCIUM 9.2   MG 2.2     Pertinent Medications Reviewed: reviewed  Pertinent Medications Comments:   Current Facility-Administered Medications   Medication Dose Route Frequency    allopurinoL  300 mg Oral Daily    amiodarone  200 mg Oral BID    aspirin  81 mg Oral Daily    atorvastatin  40 mg Oral QHS    cetirizine  10 mg Oral Daily    empagliflozin  10 mg Oral Daily    famotidine  20 mg Oral Daily    fluticasone propionate  2 spray Each Nostril Daily    furosemide (LASIX) injection  80 mg Intravenous Q12H    heparin (PORCINE)  60 Units/kg (Adjusted) Intravenous Once    hydrALAZINE 10 mg 1 tablet, hydrALAZINE 25 mg 1 tablet, isorsorbide dinitrate 20 mg 1 tablet combination   Oral TID    oseltamivir  30 mg Oral Daily    predniSONE  40 mg Oral Daily    pregabalin  100 mg Oral QHS    sodium chloride 0.9%  10 mL Intravenous Q6H    tiotropium " bromide  2 puff Inhalation Daily      Current Facility-Administered Medications   Medication Dose Route Frequency Last Rate Last Admin    DOBUTamine  5 mcg/kg/min Intravenous Continuous 6.1 mL/hr at 05/06/24 0700 5 mcg/kg/min at 05/06/24 0700    heparin (porcine) in D5W  0-40 Units/kg/hr (Adjusted) Intravenous Continuous         Estimated/Assessed Needs    Weight Used For Calorie Calculations: 59 kg (130 lb 1.1 oz) (IBW)  Energy Calorie Requirements (kcal): 1770 -2065 (30-35 kcals/kg IBW)  Energy Need Method: Kcal/kg  Protein Requirements: 47 - 59 g (0.8 - 1.0 g (Chronic CKD4))  Weight Used For Protein Calculations: 59 kg (130 lb 1.1 oz) (IBW)  Fluid Requirements (mL): 1770 mL (1 ml/kcal or per MD)  Estimated Fluid Requirement Method: RDA Method   RDA Method (mL): 1770  CHO Requirement: 221 g (50% of total intake (diabetes))    Nutrition Prescription Ordered    Current Diet Order: Diet diabetic Cardiac (Low Na/Chol); 2000 Calorie    Evaluation of Received Nutrient/Fluid Intake    I/O: -764.7 mL since admit  Energy Calories Required: not meeting needs  Protein Required: not meeting needs  Fluid Required: not meeting needs  Comments: LBM: 05/04/2024  % Intake of Estimated Energy Needs: 50 - 75 %  % Meal Intake: 50 - 75 %    Nutrition Risk    Level of Risk/Frequency of Follow-up:  (1-2 x per week)     Monitor and Evaluation    Food and Nutrient Intake: energy intake, food and beverage intake  Food and Nutrient Adminstration: diet order  Anthropometric Measurements: weight, weight change  Biochemical Data, Medical Tests and Procedures: electrolyte and renal panel, gastrointestinal profile, glucose/endocrine profile, inflammatory profile, lipid profile  Nutrition-Focused Physical Findings: overall appearance     Nutrition Follow-Up    RD Follow-up?: Yes    Lolsi Swanson, Dietetic Intern

## 2024-05-06 NOTE — PLAN OF CARE
- Patient admitted 5/5/24 with hyperglycemia (resolved), chest pain, and malaise.  - Patient has history of heart failure with EF of 10-15% and she is on home Dobutamine.  - Patient is positive for the flu - droplet precautions maintained; Tamiflu ordered daily x 5 days.    Events overnight:  - At ~2130 patient reported new, sudden, severe left jaw pain that began radiating to her throat. MD was notified - stated to monitor patient for worsening symptoms. Jaw pain subsided, but throat discomfort continued. Chloraseptic lozenges obtained.  - At ~0150 patient had a 27-beat run of V-tach on cardiac monitor. Patient has an AICD. MD was notified - stated to pull AM labs to check for possible electrolyte issues. Labs drawn; K+ was 4.5 and Mg 2.2.    - Dobutamine infusing to patient's PICC line at 5 mcg/kg/min (hospital supply). Patient has her ambulatory pump and home medication at bedside to switch back upon discharge.  - Patient is ambulatory - due to IV diuresis, bedside commode is in use.  - Promethazine-codeine cough syrup administered once overnight.  - Patient's creatinine is above her baseline this morning= 3.3.  - Plan for echo today.  - Per handoff report, patient may be discharged today.

## 2024-05-06 NOTE — ASSESSMENT & PLAN NOTE
Patient with increased fatigue and sore throat. Chronic cough.  Procal negative, no antibiotics for now     -Continue home promethazine-codeine prn   -Tamiflu for 5 days 30mg daily, per GFR (day 2/5)

## 2024-05-07 LAB
ALBUMIN SERPL BCP-MCNC: 3.7 G/DL (ref 3.5–5.2)
ALP SERPL-CCNC: 44 U/L (ref 55–135)
ALT SERPL W/O P-5'-P-CCNC: 12 U/L (ref 10–44)
ANION GAP SERPL CALC-SCNC: 15 MMOL/L (ref 8–16)
APTT PPP: 40.4 SEC (ref 21–32)
APTT PPP: 53.6 SEC (ref 21–32)
ASCENDING AORTA: 3.49 CM
AST SERPL-CCNC: 13 U/L (ref 10–40)
AV INDEX (PROSTH): 1.14
AV MEAN GRADIENT: 7 MMHG
AV PEAK GRADIENT: 10 MMHG
AV VALVE AREA BY VELOCITY RATIO: 2.7 CM²
AV VALVE AREA: 3.87 CM²
AV VELOCITY RATIO: 0.79
BASOPHILS # BLD AUTO: 0.02 K/UL (ref 0–0.2)
BASOPHILS NFR BLD: 0.2 % (ref 0–1.9)
BILIRUB SERPL-MCNC: 1.6 MG/DL (ref 0.1–1)
BSA FOR ECHO PROCEDURE: 1.93 M2
BUN SERPL-MCNC: 96 MG/DL (ref 6–20)
CALCIUM SERPL-MCNC: 9.3 MG/DL (ref 8.7–10.5)
CHLORIDE SERPL-SCNC: 96 MMOL/L (ref 95–110)
CO2 SERPL-SCNC: 23 MMOL/L (ref 23–29)
CREAT SERPL-MCNC: 3.5 MG/DL (ref 0.5–1.4)
CV ECHO LV RWT: 0.27 CM
DIFFERENTIAL METHOD BLD: ABNORMAL
DOP CALC AO PEAK VEL: 1.55 M/S
DOP CALC AO VTI: 16.63 CM
DOP CALC LVOT AREA: 3.4 CM2
DOP CALC LVOT DIAMETER: 2.08 CM
DOP CALC LVOT PEAK VEL: 1.23 M/S
DOP CALC LVOT STROKE VOLUME: 64.36 CM3
DOP CALCLVOT PEAK VEL VTI: 18.95 CM
E WAVE DECELERATION TIME: 129.1 MSEC
E/A RATIO: 1.08
E/E' RATIO: 14.75 M/S
ECHO LV POSTERIOR WALL: 1 CM (ref 0.6–1.1)
EOSINOPHIL # BLD AUTO: 0 K/UL (ref 0–0.5)
EOSINOPHIL NFR BLD: 0.2 % (ref 0–8)
ERYTHROCYTE [DISTWIDTH] IN BLOOD BY AUTOMATED COUNT: 28 % (ref 11.5–14.5)
EST. GFR  (NO RACE VARIABLE): 14.5 ML/MIN/1.73 M^2
FRACTIONAL SHORTENING: 15 % (ref 28–44)
GLUCOSE SERPL-MCNC: 144 MG/DL (ref 70–110)
HCT VFR BLD AUTO: 34 % (ref 37–48.5)
HGB BLD-MCNC: 9.9 G/DL (ref 12–16)
IMM GRANULOCYTES # BLD AUTO: 0.09 K/UL (ref 0–0.04)
IMM GRANULOCYTES NFR BLD AUTO: 1 % (ref 0–0.5)
INTERVENTRICULAR SEPTUM: 1.2 CM (ref 0.6–1.1)
IVRT: 95.15 MSEC
LA MAJOR: 7.05 CM
LA MINOR: 7.49 CM
LA WIDTH: 4.86 CM
LEFT ATRIUM SIZE: 5.88 CM
LEFT ATRIUM VOLUME INDEX MOD: 60.2 ML/M2
LEFT ATRIUM VOLUME INDEX: 92.9 ML/M2
LEFT ATRIUM VOLUME MOD: 114.45 CM3
LEFT ATRIUM VOLUME: 176.43 CM3
LEFT INTERNAL DIMENSION IN SYSTOLE: 6.2 CM (ref 2.1–4)
LEFT VENTRICLE DIASTOLIC VOLUME INDEX: 111.81 ML/M2
LEFT VENTRICLE DIASTOLIC VOLUME: 212.44 ML
LEFT VENTRICLE MASS INDEX: 205 G/M2
LEFT VENTRICLE SYSTOLIC VOLUME INDEX: 77.8 ML/M2
LEFT VENTRICLE SYSTOLIC VOLUME: 147.77 ML
LEFT VENTRICULAR INTERNAL DIMENSION IN DIASTOLE: 7.3 CM (ref 3.5–6)
LEFT VENTRICULAR MASS: 390.27 G
LV LATERAL E/E' RATIO: 9.08 M/S
LV SEPTAL E/E' RATIO: 39.33 M/S
LYMPHOCYTES # BLD AUTO: 0.9 K/UL (ref 1–4.8)
LYMPHOCYTES NFR BLD: 10.3 % (ref 18–48)
MAGNESIUM SERPL-MCNC: 2.2 MG/DL (ref 1.6–2.6)
MCH RBC QN AUTO: 18.5 PG (ref 27–31)
MCHC RBC AUTO-ENTMCNC: 29.1 G/DL (ref 32–36)
MCV RBC AUTO: 64 FL (ref 82–98)
MONOCYTES # BLD AUTO: 0.8 K/UL (ref 0.3–1)
MONOCYTES NFR BLD: 9.2 % (ref 4–15)
MV PEAK A VEL: 1.09 M/S
MV PEAK E VEL: 1.18 M/S
MV STENOSIS PRESSURE HALF TIME: 37.44 MS
MV VALVE AREA P 1/2 METHOD: 5.88 CM2
NEUTROPHILS # BLD AUTO: 6.9 K/UL (ref 1.8–7.7)
NEUTROPHILS NFR BLD: 79.1 % (ref 38–73)
NRBC BLD-RTO: 2 /100 WBC
OHS CV RV/LV RATIO: 0.46 CM
PHOSPHATE SERPL-MCNC: 4 MG/DL (ref 2.7–4.5)
PISA TR MAX VEL: 3.22 M/S
PLATELET # BLD AUTO: 156 K/UL (ref 150–450)
PMV BLD AUTO: ABNORMAL FL (ref 9.2–12.9)
POCT GLUCOSE: 136 MG/DL (ref 70–110)
POCT GLUCOSE: 172 MG/DL (ref 70–110)
POCT GLUCOSE: 252 MG/DL (ref 70–110)
POCT GLUCOSE: 302 MG/DL (ref 70–110)
POCT GLUCOSE: 408 MG/DL (ref 70–110)
POTASSIUM SERPL-SCNC: 4.3 MMOL/L (ref 3.5–5.1)
PROT SERPL-MCNC: 6.5 G/DL (ref 6–8.4)
RA MAJOR: 5.34 CM
RA PRESSURE ESTIMATED: 3 MMHG
RA WIDTH: 3.4 CM
RBC # BLD AUTO: 5.35 M/UL (ref 4–5.4)
RIGHT VENTRICULAR END-DIASTOLIC DIMENSION: 3.35 CM
RV TB RVSP: 6 MMHG
SINUS: 2.75 CM
SODIUM SERPL-SCNC: 134 MMOL/L (ref 136–145)
STJ: 2.37 CM
TDI LATERAL: 0.13 M/S
TDI SEPTAL: 0.03 M/S
TDI: 0.08 M/S
TR MAX PG: 41 MMHG
TRICUSPID ANNULAR PLANE SYSTOLIC EXCURSION: 1.95 CM
TV REST PULMONARY ARTERY PRESSURE: 44 MMHG
WBC # BLD AUTO: 8.72 K/UL (ref 3.9–12.7)
Z-SCORE OF LEFT VENTRICULAR DIMENSION IN END DIASTOLE: 3.14
Z-SCORE OF LEFT VENTRICULAR DIMENSION IN END SYSTOLE: 5.02

## 2024-05-07 PROCEDURE — 80053 COMPREHEN METABOLIC PANEL: CPT | Performed by: INTERNAL MEDICINE

## 2024-05-07 PROCEDURE — 25000003 PHARM REV CODE 250: Performed by: STUDENT IN AN ORGANIZED HEALTH CARE EDUCATION/TRAINING PROGRAM

## 2024-05-07 PROCEDURE — 36415 COLL VENOUS BLD VENIPUNCTURE: CPT | Performed by: INTERNAL MEDICINE

## 2024-05-07 PROCEDURE — 85730 THROMBOPLASTIN TIME PARTIAL: CPT | Mod: 91 | Performed by: INTERNAL MEDICINE

## 2024-05-07 PROCEDURE — 85025 COMPLETE CBC W/AUTO DIFF WBC: CPT | Performed by: INTERNAL MEDICINE

## 2024-05-07 PROCEDURE — 85730 THROMBOPLASTIN TIME PARTIAL: CPT

## 2024-05-07 PROCEDURE — 63600175 PHARM REV CODE 636 W HCPCS: Performed by: STUDENT IN AN ORGANIZED HEALTH CARE EDUCATION/TRAINING PROGRAM

## 2024-05-07 PROCEDURE — 25000242 PHARM REV CODE 250 ALT 637 W/ HCPCS: Performed by: STUDENT IN AN ORGANIZED HEALTH CARE EDUCATION/TRAINING PROGRAM

## 2024-05-07 PROCEDURE — 94640 AIRWAY INHALATION TREATMENT: CPT

## 2024-05-07 PROCEDURE — 94761 N-INVAS EAR/PLS OXIMETRY MLT: CPT

## 2024-05-07 PROCEDURE — 25000003 PHARM REV CODE 250

## 2024-05-07 PROCEDURE — 83735 ASSAY OF MAGNESIUM: CPT | Performed by: INTERNAL MEDICINE

## 2024-05-07 PROCEDURE — 27000207 HC ISOLATION

## 2024-05-07 PROCEDURE — 63600175 PHARM REV CODE 636 W HCPCS

## 2024-05-07 PROCEDURE — 20600001 HC STEP DOWN PRIVATE ROOM

## 2024-05-07 PROCEDURE — 84100 ASSAY OF PHOSPHORUS: CPT | Performed by: INTERNAL MEDICINE

## 2024-05-07 PROCEDURE — 99233 SBSQ HOSP IP/OBS HIGH 50: CPT | Mod: ,,, | Performed by: INTERNAL MEDICINE

## 2024-05-07 RX ORDER — BUMETANIDE 1 MG/1
4 TABLET ORAL 2 TIMES DAILY
Status: DISCONTINUED | OUTPATIENT
Start: 2024-05-07 | End: 2024-05-08 | Stop reason: HOSPADM

## 2024-05-07 RX ORDER — INSULIN ASPART 100 [IU]/ML
3 INJECTION, SOLUTION INTRAVENOUS; SUBCUTANEOUS ONCE
Status: COMPLETED | OUTPATIENT
Start: 2024-05-07 | End: 2024-05-07

## 2024-05-07 RX ORDER — METOLAZONE 2.5 MG/1
5 TABLET ORAL DAILY
Status: DISCONTINUED | OUTPATIENT
Start: 2024-05-08 | End: 2024-05-08 | Stop reason: HOSPADM

## 2024-05-07 RX ORDER — INSULIN GLARGINE 100 [IU]/ML
3 INJECTION, SOLUTION SUBCUTANEOUS DAILY
Status: DISCONTINUED | OUTPATIENT
Start: 2024-05-07 | End: 2024-05-07

## 2024-05-07 RX ORDER — INSULIN GLARGINE 100 [IU]/ML
3 INJECTION, SOLUTION SUBCUTANEOUS DAILY
Status: DISCONTINUED | OUTPATIENT
Start: 2024-05-08 | End: 2024-05-07

## 2024-05-07 RX ORDER — INSULIN GLARGINE 100 [IU]/ML
3 INJECTION, SOLUTION SUBCUTANEOUS NIGHTLY
Status: DISCONTINUED | OUTPATIENT
Start: 2024-05-07 | End: 2024-05-08 | Stop reason: HOSPADM

## 2024-05-07 RX ADMIN — AMIODARONE HYDROCHLORIDE 200 MG: 200 TABLET ORAL at 08:05

## 2024-05-07 RX ADMIN — BUMETANIDE 4 MG: 1 TABLET ORAL at 04:05

## 2024-05-07 RX ADMIN — CETIRIZINE HYDROCHLORIDE 10 MG: 10 TABLET, FILM COATED ORAL at 09:05

## 2024-05-07 RX ADMIN — INSULIN ASPART 3 UNITS: 100 INJECTION, SOLUTION INTRAVENOUS; SUBCUTANEOUS at 08:05

## 2024-05-07 RX ADMIN — PREDNISONE 40 MG: 20 TABLET ORAL at 09:05

## 2024-05-07 RX ADMIN — PROMETHAZINE HYDROCHLORIDE AND CODEINE PHOSPHATE 5 ML: 6.25; 1 SOLUTION ORAL at 10:05

## 2024-05-07 RX ADMIN — EMPAGLIFLOZIN 10 MG: 10 TABLET, FILM COATED ORAL at 09:05

## 2024-05-07 RX ADMIN — DOBUTAMINE HYDROCHLORIDE 5 MCG/KG/MIN: 400 INJECTION INTRAVENOUS at 10:05

## 2024-05-07 RX ADMIN — INSULIN GLARGINE 3 UNITS: 100 INJECTION, SOLUTION SUBCUTANEOUS at 09:05

## 2024-05-07 RX ADMIN — ASPIRIN 81 MG: 81 TABLET, COATED ORAL at 09:05

## 2024-05-07 RX ADMIN — HYDRALAZINE HYDROCHLORIDE: 10 TABLET, FILM COATED ORAL at 08:05

## 2024-05-07 RX ADMIN — OSELTAMIVIR PHOSPHATE 30 MG: 30 CAPSULE ORAL at 09:05

## 2024-05-07 RX ADMIN — TIOTROPIUM BROMIDE INHALATION SPRAY 2 PUFF: 3.12 SPRAY, METERED RESPIRATORY (INHALATION) at 08:05

## 2024-05-07 RX ADMIN — HYDRALAZINE HYDROCHLORIDE: 10 TABLET, FILM COATED ORAL at 09:05

## 2024-05-07 RX ADMIN — HEPARIN SODIUM AND DEXTROSE 15 UNITS/KG/HR: 10000; 5 INJECTION INTRAVENOUS at 05:05

## 2024-05-07 RX ADMIN — FLUTICASONE PROPIONATE 100 MCG: 50 SPRAY, METERED NASAL at 09:05

## 2024-05-07 RX ADMIN — INSULIN ASPART 3 UNITS: 100 INJECTION, SOLUTION INTRAVENOUS; SUBCUTANEOUS at 04:05

## 2024-05-07 RX ADMIN — ALLOPURINOL 300 MG: 100 TABLET ORAL at 09:05

## 2024-05-07 RX ADMIN — HYDRALAZINE HYDROCHLORIDE: 10 TABLET, FILM COATED ORAL at 02:05

## 2024-05-07 RX ADMIN — PREGABALIN 100 MG: 50 CAPSULE ORAL at 08:05

## 2024-05-07 RX ADMIN — FAMOTIDINE 20 MG: 20 TABLET ORAL at 09:05

## 2024-05-07 RX ADMIN — FUROSEMIDE 80 MG: 10 INJECTION, SOLUTION INTRAMUSCULAR; INTRAVENOUS at 09:05

## 2024-05-07 RX ADMIN — AMIODARONE HYDROCHLORIDE 200 MG: 200 TABLET ORAL at 09:05

## 2024-05-07 RX ADMIN — ATORVASTATIN CALCIUM 40 MG: 40 TABLET, FILM COATED ORAL at 08:05

## 2024-05-07 NOTE — PLAN OF CARE
- AAOx4, afebrile, VSS, RA overnight.   - Patient tested positive for Flu A on 5/4. Droplet precautions maintained. Daily Tamiflu continued. PRN cough syrup (promethazine-codeine) administered x 1 overnight.  - Dobutamine gtt continued at 5 mcg/kg/min (6.1 mL/hr).   - Heparin gtt currently infusing at 15 units/kg/hr (10.2 mL/hr). Next PTT due at 0530. Infusion required bolus and titration overnight, refer to previous notes.   - Echo completed at bedside overnight, awaiting results.   - Bed in lowest locked position, call light and personal items in reach, nonskid socks on, verbalized understanding to call for assistance.

## 2024-05-07 NOTE — ASSESSMENT & PLAN NOTE
Patient's COPD is controlled currently. Continue scheduled home inhalers and monitor respiratory status closely.   -Unsure if patient was started on steroids for COPD exacerbation  -Prednisone 40mg daily will treat for 5 days (day 4/5)

## 2024-05-07 NOTE — ASSESSMENT & PLAN NOTE
Patient with Paroxysmal (<7 days) atrial fibrillation/AFL which is controlled currently with Amiodarone. Patient is currently in sinus rhythm.[id number] Score: 3. Anticoagulation indicated. Anticoagulation done with Eliquis .  -Continue home amiodarone  -Continue home eliquis  -Will review chart for justification of dose-reduction and will increase dose if indicated

## 2024-05-07 NOTE — ASSESSMENT & PLAN NOTE
Patient with increased fatigue and sore throat. Chronic cough.  Procal negative, no antibiotics for now     -Continue home promethazine-codeine prn   -Tamiflu for 5 days 30mg daily, per GFR (day 3/5)

## 2024-05-07 NOTE — NURSING
05/07/24 0156   Vital Signs   Temp 97.9 °F (36.6 °C)   Temp Source Oral   Pulse 94   Resp 16   SpO2 95 %   Device (Oxygen Therapy) room air   /68   BP Location Left arm   BP Method Automatic   Patient Position Lying     26 beats of vtach visualized on telemetry. Pt asymptomatic and resting comfortably in bed. VSS, listed above. Cardiology paged and secure chat sent.

## 2024-05-07 NOTE — ASSESSMENT & PLAN NOTE
Creatine stable for now. BMP reviewed- noted Estimated Creatinine Clearance: 18.7 mL/min (A) (based on SCr of 3.5 mg/dL (H)). according to latest data. Based on current GFR, CKD stage is stage 4 - GFR 15-29.  Monitor UOP and serial BMP and adjust therapy as needed. Renally dose meds. Avoid nephrotoxic medications and procedures.  Bl creatinine 2.2-2.6    Cr uptrend to 3.5 from 3.3. Considering significant BNP elevation to 3051 on admission, may be secondary to cardiorenal syndrome. Transitioning to oral bumex/metolazone regimen as patient appears euvolemic.

## 2024-05-07 NOTE — PLAN OF CARE
Pt is AAOx4; afebrile; vital signs stable. Heparin gtt continues at 15U/hr. pTT therapeutic twice today and will be rechecked in the morning. Dobutamine at 5cmg/kg/hr. 80mg IV lasix given, which is now replaced with PO bumex. BG reading in the 300s this afternoon on her dexcom. POCT done and was 302. 3U novolog given. She is up independently.

## 2024-05-07 NOTE — ASSESSMENT & PLAN NOTE
Acute decompensated heart failure 2/2 niCM (EF 15%) Stage D, NYHA III  Not a candidate for advanced options, on home palliative   Presented to the hospital with decompensated heart failure in the setting of influenza  Entresto was discontinued recently by PCP, unknown reason  Responded well to total of 80mg Lasix IV x2 doses with ~1L output. Still with crackles and abdominal distension.  Patient appears euvolemic with uptrending renal function; will transition to increased dose of home diuretics    Discontinue IV diuretics as patient seems to be approaching euvolemia  Starting Bumex 4mg BID  Starting metolazone 5mg QD  Cont home   Jardiance 25mg at home, 10mg inpatient   Strict I&O  Daily wt

## 2024-05-07 NOTE — NURSING
First PTT >150 was incorrect as pulled from line heparin gtt going through even after proper protocol used. So recheck was done per lab on different arm and result was 28.3 with bolus of 60units and increase gtt to 15units (up 3units} per nomogram.

## 2024-05-07 NOTE — PROGRESS NOTES
[facility]  Cardiology  Progress Note    Patient Name: Hafsa Hawley  MRN: [id number]  Admission Date: 5/5/2024  Hospital Length of Stay: 2 days  Code Status: Full Code   Attending Physician: Charbel Cordova MD   Primary Care Physician: Cristobal Ann MD  Expected Discharge Date: 5/8/2024  Principal Problem:<principal problem not specified>    Subjective:     Hospital Course:   Patient was admitted to Hospital Medicine for medical management and evaluation of ADHF in the setting of influenzae infection. Patient responded well to initial diuresis, will transition to increased dose of home oral diuretic regimen. Patient displayed intermittent, self-resolved runs of ventricular arrhythmia while inpatient.     Interval History: Patient denies further episodes of L jaw pain. Patient had another episode of non-sustained VT overnight. //80, last 121/72, and HR  and last 87. Heparin gtt continues in lieu of NOAC. In 1340cc charted, Out 2950 UOP.     Review of Systems   Constitutional: Negative for chills, diaphoresis, malaise/fatigue and night sweats.   HENT:  Positive for sore throat.    Cardiovascular:  Negative for chest pain, dyspnea on exertion, leg swelling, near-syncope and palpitations.   Respiratory:  Negative for cough, shortness of breath and wheezing.    Skin:  Negative for color change and dry skin.   Musculoskeletal:  Negative for joint pain, joint swelling and myalgias.   Gastrointestinal:  Negative for abdominal pain, diarrhea, nausea and vomiting.   Genitourinary:  Negative for dysuria.   Neurological:  Positive for weakness. Negative for dizziness, headaches and light-headedness.   All other systems reviewed and are negative.    Objective:     Vital Signs (Most Recent):  Temp: 98.5 °F (36.9 °C) (05/07/24 1202)  Pulse: 97 (05/07/24 1202)  Resp: 18 (05/07/24 1202)  BP: 124/80 (05/07/24 1202)  SpO2: 95 % (05/07/24 1202) Vital Signs (24h Range):  Temp:  [97.5 °F  (36.4 °C)-98.6 °F (37 °C)] 98.5 °F (36.9 °C)  Pulse:  [] 97  Resp:  [16-18] 18  SpO2:  [94 %-96 %] 95 %  BP: (112-128)/(68-84) 124/80     Weight: 79.8 kg (175 lb 14.8 oz)  Body mass index is 28.4 kg/m².     SpO2: 95 %         Intake/Output Summary (Last 24 hours) at 5/7/2024 1418  Last data filed at 5/7/2024 0401  Gross per 24 hour   Intake 830.89 ml   Output 2550 ml   Net -1719.11 ml       Lines/Drains/Airways       Peripherally Inserted Central Catheter Line  Duration             PICC Double Lumen 05/12/23 1534 right brachial 360 days              Peripheral Intravenous Line  Duration                  Peripheral IV - Single Lumen 05/04/24 1807 20 G Anterior;Left Forearm 2 days                       Physical Exam  Vitals and nursing note reviewed.   Constitutional:       General: She is not in acute distress.     Appearance: Normal appearance. She is not ill-appearing.   HENT:      Mouth/Throat:      Mouth: Mucous membranes are moist.      Pharynx: Oropharynx is clear.   Eyes:      General: No scleral icterus.     Pupils: Pupils are equal, round, and reactive to light.   Cardiovascular:      Rate and Rhythm: Normal rate and regular rhythm.      Pulses: Normal pulses.   Pulmonary:      Effort: Pulmonary effort is normal.      Breath sounds: Rales (trace bibasilar) present.   Abdominal:      General: Abdomen is flat. There is distension.      Palpations: Abdomen is soft.      Tenderness: There is no abdominal tenderness.   Musculoskeletal:      Cervical back: Normal range of motion.      Right lower leg: No edema.      Left lower leg: No edema.   Skin:     General: Skin is warm and dry.   Neurological:      General: No focal deficit present.      Mental Status: She is alert and oriented to person, place, and time.   Psychiatric:         Mood and Affect: Mood normal.         Behavior: Behavior normal.            Significant Labs: CMP   Recent Labs   Lab 05/06/24  0222 05/07/24  0457   * 134*   K 4.5 4.3    CL 98 96   CO2 26 23   * 144*   BUN 83* 96*   CREATININE 3.3* 3.5*   CALCIUM 9.2 9.3   PROT 6.3 6.5   ALBUMIN 3.6 3.7   BILITOT 1.5* 1.6*   ALKPHOS 40* 44*   AST 15 13   ALT 14 12   ANIONGAP 11 15   , CBC   Recent Labs   Lab 05/06/24  0222 05/06/24  1323 05/07/24  0457   WBC 7.24 8.58 8.72   HGB 9.3* 9.9* 9.9*   HCT 32.4* 34.0* 34.0*    165 156     Significant Imaging: All significant imaging from the last 24 hours has been reviewed.    TTE 5/6/24    Left Ventricle: The left ventricle is severely dilated. Severely increased ventricular mass. Normal wall thickness. There is severe eccentric hypertrophy. Severe global hypokinesis present. There is severely reduced systolic function with a visually estimated ejection fraction of 10 -15%. Grade II diastolic dysfunction. Elevated left ventricular filling pressure. No thrombus present.    Right Ventricle: Normal right ventricular cavity size. Wall thickness is normal. Right ventricle wall motion  is normal. Systolic function is mildly reduced. Pacemaker lead present in the ventricle.    Left Atrium: Left atrium is severely dilated.    Right Atrium: Normal right atrial size.    Aortic Valve: The aortic valve is a trileaflet valve. There is mild annular calcification present. There is mild aortic regurgitation.    Mitral Valve: There is moderate to severe regurgitation with an eccentric jet and a wall hugging jet.    Tricuspid Valve: There is mild regurgitation.    Pulmonic Valve: There is mild regurgitation.    Aorta: Aortic root is normal in size measuring 2.75 cm. Ascending aorta is normal measuring 3.49 cm.    Pulmonary Artery: The estimated pulmonary artery systolic pressure is 44 mmHg.    IVC/SVC: Normal venous pressure at 3 mmHg.     Assessment and Plan:     Influenza A  Patient with increased fatigue and sore throat. Chronic cough.  Procal negative, no antibiotics for now     -Continue home promethazine-codeine prn   -Tamiflu for 5 days 30mg daily,  per GFR (day 3/5)        Paroxysmal A-fib  Patient with Paroxysmal (<7 days) atrial fibrillation/AFL which is controlled currently with Amiodarone. Patient is currently in sinus rhythm.[id number] Score: 3. Anticoagulation indicated. Anticoagulation done with Eliquis .  -Continue home amiodarone  -Continue home eliquis  -Will review chart for justification of dose-reduction and will increase dose if indicated    Gout, arthritis  Continue allopurinol   Will consider dose reduction if renal function declines    COPD exacerbation  Patient's COPD is controlled currently. Continue scheduled home inhalers and monitor respiratory status closely.   -Unsure if patient was started on steroids for COPD exacerbation  -Prednisone 40mg daily will treat for 5 days (day 4/5)    CKD (chronic kidney disease), stage IV  Creatine stable for now. BMP reviewed- noted Estimated Creatinine Clearance: 18.7 mL/min (A) (based on SCr of 3.5 mg/dL (H)). according to latest data. Based on current GFR, CKD stage is stage 4 - GFR 15-29.  Monitor UOP and serial BMP and adjust therapy as needed. Renally dose meds. Avoid nephrotoxic medications and procedures.  Bl creatinine 2.2-2.6    Cr uptrend to 3.5 from 3.3. Considering significant BNP elevation to 3051 on admission, may be secondary to cardiorenal syndrome. Transitioning to oral bumex/metolazone regimen as patient appears euvolemic.    Acute on chronic combined systolic and diastolic heart failure  Acute decompensated heart failure 2/2 niCM (EF 15%) Stage D, NYHA III  Not a candidate for advanced options, on home palliative   Presented to the hospital with decompensated heart failure in the setting of influenza  Entresto was discontinued recently by PCP, unknown reason  Responded well to total of 80mg Lasix IV x2 doses with ~1L output. Still with crackles and abdominal distension.  Patient appears euvolemic with uptrending renal function; will transition to increased dose of home  diuretics    Discontinue IV diuretics as patient seems to be approaching euvolemia  Starting Bumex 4mg BID  Starting metolazone 5mg QD  Cont home   Jardiance 25mg at home, 10mg inpatient   Strict I&O  Daily wt       Essential hypertension  Chronic, controlled.  -Continue home bidil         Microcytic anemia  Patient with chronic anemia secondary to CKD   -stable         VTE Risk Mitigation (From admission, onward)           Ordered     heparin 25,000 units in dextrose 5% (100 units/ml) IV bolus from bag LOW INTENSITY nomogram - [facility]  As needed (PRN)        Question:  Heparin Infusion Adjustment (DO NOT MODIFY ANSWER)  Answer:  [email or web address]\epic\Images\Pharmacy\HeparinInfusions\heparin LOW INTENSITY nomogram for [facility] [id number]    05/06/24 1203     heparin 25,000 units in dextrose 5% (100 units/ml) IV bolus from bag LOW INTENSITY nomogram - [facility]  As needed (PRN)        Question:  Heparin Infusion Adjustment (DO NOT MODIFY ANSWER)  Answer:  \\[email or web address]\epic\Images\Pharmacy\HeparinInfusions\heparin LOW INTENSITY nomogram for [facility] [id number]    05/06/24 1203     heparin 25,000 units in dextrose 5% 250 mL (100 units/mL) infusion LOW INTENSITY nomogram - [facility]  Continuous        Question:  Begin at (units/kg/hr)  Answer:  12    05/06/24 1203     IP VTE HIGH RISK PATIENT  Once         05/05/24 0204     Place sequential compression device  Until discontinued         05/05/24 0204                    Barrie Mustafa MD  Cardiology  [facility]

## 2024-05-07 NOTE — SUBJECTIVE & OBJECTIVE
Interval History: Patient denies further episodes of L jaw pain. Patient had another episode of non-sustained VT overnight. //80, last 121/72, and HR  and last 87. Heparin gtt continues in lieu of NOAC. In 1340cc charted, Out 2950 UOP.     Review of Systems   Constitutional: Negative for chills, diaphoresis, malaise/fatigue and night sweats.   HENT:  Positive for sore throat.    Cardiovascular:  Negative for chest pain, dyspnea on exertion, leg swelling, near-syncope and palpitations.   Respiratory:  Negative for cough, shortness of breath and wheezing.    Skin:  Negative for color change and dry skin.   Musculoskeletal:  Negative for joint pain, joint swelling and myalgias.   Gastrointestinal:  Negative for abdominal pain, diarrhea, nausea and vomiting.   Genitourinary:  Negative for dysuria.   Neurological:  Positive for weakness. Negative for dizziness, headaches and light-headedness.   All other systems reviewed and are negative.    Objective:     Vital Signs (Most Recent):  Temp: 98.5 °F (36.9 °C) (05/07/24 1202)  Pulse: 97 (05/07/24 1202)  Resp: 18 (05/07/24 1202)  BP: 124/80 (05/07/24 1202)  SpO2: 95 % (05/07/24 1202) Vital Signs (24h Range):  Temp:  [97.5 °F (36.4 °C)-98.6 °F (37 °C)] 98.5 °F (36.9 °C)  Pulse:  [] 97  Resp:  [16-18] 18  SpO2:  [94 %-96 %] 95 %  BP: (112-128)/(68-84) 124/80     Weight: 79.8 kg (175 lb 14.8 oz)  Body mass index is 28.4 kg/m².     SpO2: 95 %         Intake/Output Summary (Last 24 hours) at 5/7/2024 1418  Last data filed at 5/7/2024 0401  Gross per 24 hour   Intake 830.89 ml   Output 2550 ml   Net -1719.11 ml       Lines/Drains/Airways       Peripherally Inserted Central Catheter Line  Duration             PICC Double Lumen 05/12/23 1534 right brachial 360 days              Peripheral Intravenous Line  Duration                  Peripheral IV - Single Lumen 05/04/24 1807 20 G Anterior;Left Forearm 2 days                       Physical Exam  Vitals and  nursing note reviewed.   Constitutional:       General: She is not in acute distress.     Appearance: Normal appearance. She is not ill-appearing.   HENT:      Mouth/Throat:      Mouth: Mucous membranes are moist.      Pharynx: Oropharynx is clear.   Eyes:      General: No scleral icterus.     Pupils: Pupils are equal, round, and reactive to light.   Cardiovascular:      Rate and Rhythm: Normal rate and regular rhythm.      Pulses: Normal pulses.   Pulmonary:      Effort: Pulmonary effort is normal.      Breath sounds: Rales (trace bibasilar) present.   Abdominal:      General: Abdomen is flat. There is distension.      Palpations: Abdomen is soft.      Tenderness: There is no abdominal tenderness.   Musculoskeletal:      Cervical back: Normal range of motion.      Right lower leg: No edema.      Left lower leg: No edema.   Skin:     General: Skin is warm and dry.   Neurological:      General: No focal deficit present.      Mental Status: She is alert and oriented to person, place, and time.   Psychiatric:         Mood and Affect: Mood normal.         Behavior: Behavior normal.            Significant Labs: CMP   Recent Labs   Lab 05/06/24 0222 05/07/24  0457   * 134*   K 4.5 4.3   CL 98 96   CO2 26 23   * 144*   BUN 83* 96*   CREATININE 3.3* 3.5*   CALCIUM 9.2 9.3   PROT 6.3 6.5   ALBUMIN 3.6 3.7   BILITOT 1.5* 1.6*   ALKPHOS 40* 44*   AST 15 13   ALT 14 12   ANIONGAP 11 15   , CBC   Recent Labs   Lab 05/06/24 0222 05/06/24  1323 05/07/24  0457   WBC 7.24 8.58 8.72   HGB 9.3* 9.9* 9.9*   HCT 32.4* 34.0* 34.0*    165 156     Significant Imaging: All significant imaging from the last 24 hours has been reviewed.    TTE 5/6/24    Left Ventricle: The left ventricle is severely dilated. Severely increased ventricular mass. Normal wall thickness. There is severe eccentric hypertrophy. Severe global hypokinesis present. There is severely reduced systolic function with a visually estimated ejection  fraction of 10 -15%. Grade II diastolic dysfunction. Elevated left ventricular filling pressure. No thrombus present.    Right Ventricle: Normal right ventricular cavity size. Wall thickness is normal. Right ventricle wall motion  is normal. Systolic function is mildly reduced. Pacemaker lead present in the ventricle.    Left Atrium: Left atrium is severely dilated.    Right Atrium: Normal right atrial size.    Aortic Valve: The aortic valve is a trileaflet valve. There is mild annular calcification present. There is mild aortic regurgitation.    Mitral Valve: There is moderate to severe regurgitation with an eccentric jet and a wall hugging jet.    Tricuspid Valve: There is mild regurgitation.    Pulmonic Valve: There is mild regurgitation.    Aorta: Aortic root is normal in size measuring 2.75 cm. Ascending aorta is normal measuring 3.49 cm.    Pulmonary Artery: The estimated pulmonary artery systolic pressure is 44 mmHg.    IVC/SVC: Normal venous pressure at 3 mmHg.

## 2024-05-07 NOTE — CHAPLAIN
05/06/24 1720   Clinical Encounter Type   Visit Type Initial Visit   Visit Category General Rounding   Visited With Patient   Length of Visit 10 Minutes   Referral From    Referral To    Sikhism Encounters   Spiritual Resources Requested Prayer   Patient Spiritual Encounters   Care Provided Reflective listening;Life review/ reflection;Prayer support;Compassionate presence   Patient Coping Accepting;Depression;Anxiety;Fearful;Open/discussion;Sadness   Comments - Patient The  went to visit the pt as per the referral from the other chaplains. Pt seems to be tired and depressed. Pt is so anxious about children- a son and a daughter with five children. Pt wanted to be with her grandchildren. She said, if something happens with me, there is no body to take care of them. Tears were flowing from her eyes. She said, all I need is prayer. Provided care with active empathetic listening, explored her reason for sadness and anxiety, encouraged her to be positive. Offered  assistance and continued visit.

## 2024-05-08 ENCOUNTER — TELEPHONE (OUTPATIENT)
Dept: FAMILY MEDICINE | Facility: CLINIC | Age: 59
End: 2024-05-08
Payer: MEDICAID

## 2024-05-08 ENCOUNTER — TELEPHONE (OUTPATIENT)
Dept: ENDOCRINOLOGY | Facility: CLINIC | Age: 59
End: 2024-05-08
Payer: MEDICAID

## 2024-05-08 VITALS
RESPIRATION RATE: 20 BRPM | HEIGHT: 66 IN | DIASTOLIC BLOOD PRESSURE: 72 MMHG | BODY MASS INDEX: 28.27 KG/M2 | OXYGEN SATURATION: 95 % | TEMPERATURE: 98 F | HEART RATE: 94 BPM | WEIGHT: 175.94 LBS | SYSTOLIC BLOOD PRESSURE: 117 MMHG

## 2024-05-08 LAB
ACANTHOCYTES BLD QL SMEAR: PRESENT
ALBUMIN SERPL BCP-MCNC: 3.9 G/DL (ref 3.5–5.2)
ALP SERPL-CCNC: 44 U/L (ref 55–135)
ALT SERPL W/O P-5'-P-CCNC: 10 U/L (ref 10–44)
ANION GAP SERPL CALC-SCNC: 14 MMOL/L (ref 8–16)
ANISOCYTOSIS BLD QL SMEAR: ABNORMAL
APTT PPP: 38.8 SEC (ref 21–32)
APTT PPP: 52.1 SEC (ref 21–32)
AST SERPL-CCNC: 15 U/L (ref 10–40)
BASO STIPL BLD QL SMEAR: ABNORMAL
BASOPHILS # BLD AUTO: 0.02 K/UL (ref 0–0.2)
BASOPHILS NFR BLD: 0.2 % (ref 0–1.9)
BILIRUB SERPL-MCNC: 1.8 MG/DL (ref 0.1–1)
BUN SERPL-MCNC: 102 MG/DL (ref 6–20)
BURR CELLS BLD QL SMEAR: ABNORMAL
CALCIUM SERPL-MCNC: 9.9 MG/DL (ref 8.7–10.5)
CHLORIDE SERPL-SCNC: 96 MMOL/L (ref 95–110)
CO2 SERPL-SCNC: 24 MMOL/L (ref 23–29)
CREAT SERPL-MCNC: 3.5 MG/DL (ref 0.5–1.4)
DACRYOCYTES BLD QL SMEAR: ABNORMAL
DIFFERENTIAL METHOD BLD: ABNORMAL
EOSINOPHIL # BLD AUTO: 0 K/UL (ref 0–0.5)
EOSINOPHIL NFR BLD: 0.3 % (ref 0–8)
ERYTHROCYTE [DISTWIDTH] IN BLOOD BY AUTOMATED COUNT: 28.6 % (ref 11.5–14.5)
EST. GFR  (NO RACE VARIABLE): 14.5 ML/MIN/1.73 M^2
GIANT PLATELETS BLD QL SMEAR: PRESENT
GLUCOSE SERPL-MCNC: 125 MG/DL (ref 70–110)
HCT VFR BLD AUTO: 35 % (ref 37–48.5)
HGB BLD-MCNC: 10.6 G/DL (ref 12–16)
HOWELL-JOLLY BOD BLD QL SMEAR: ABNORMAL
IMM GRANULOCYTES # BLD AUTO: 0.13 K/UL (ref 0–0.04)
IMM GRANULOCYTES NFR BLD AUTO: 1.4 % (ref 0–0.5)
LYMPHOCYTES # BLD AUTO: 1.1 K/UL (ref 1–4.8)
LYMPHOCYTES NFR BLD: 11.7 % (ref 18–48)
MAGNESIUM SERPL-MCNC: 2.1 MG/DL (ref 1.6–2.6)
MCH RBC QN AUTO: 19.3 PG (ref 27–31)
MCHC RBC AUTO-ENTMCNC: 30.3 G/DL (ref 32–36)
MCV RBC AUTO: 64 FL (ref 82–98)
MONOCYTES # BLD AUTO: 0.8 K/UL (ref 0.3–1)
MONOCYTES NFR BLD: 8.4 % (ref 4–15)
NEUTROPHILS # BLD AUTO: 7.2 K/UL (ref 1.8–7.7)
NEUTROPHILS NFR BLD: 78 % (ref 38–73)
NRBC BLD-RTO: 3 /100 WBC
OVALOCYTES BLD QL SMEAR: ABNORMAL
PHOSPHATE SERPL-MCNC: 4.7 MG/DL (ref 2.7–4.5)
PLATELET # BLD AUTO: 167 K/UL (ref 150–450)
PMV BLD AUTO: ABNORMAL FL (ref 9.2–12.9)
POCT GLUCOSE: 149 MG/DL (ref 70–110)
POCT GLUCOSE: 180 MG/DL (ref 70–110)
POCT GLUCOSE: 254 MG/DL (ref 70–110)
POIKILOCYTOSIS BLD QL SMEAR: ABNORMAL
POLYCHROMASIA BLD QL SMEAR: ABNORMAL
POTASSIUM SERPL-SCNC: 4.5 MMOL/L (ref 3.5–5.1)
PROT SERPL-MCNC: 6.8 G/DL (ref 6–8.4)
RBC # BLD AUTO: 5.5 M/UL (ref 4–5.4)
SCHISTOCYTES BLD QL SMEAR: ABNORMAL
SCHISTOCYTES BLD QL SMEAR: PRESENT
SODIUM SERPL-SCNC: 134 MMOL/L (ref 136–145)
SPHEROCYTES BLD QL SMEAR: ABNORMAL
TARGETS BLD QL SMEAR: ABNORMAL
WBC # BLD AUTO: 9.27 K/UL (ref 3.9–12.7)

## 2024-05-08 PROCEDURE — 94640 AIRWAY INHALATION TREATMENT: CPT

## 2024-05-08 PROCEDURE — 80053 COMPREHEN METABOLIC PANEL: CPT

## 2024-05-08 PROCEDURE — 85730 THROMBOPLASTIN TIME PARTIAL: CPT

## 2024-05-08 PROCEDURE — 25000003 PHARM REV CODE 250

## 2024-05-08 PROCEDURE — 99239 HOSP IP/OBS DSCHRG MGMT >30: CPT | Mod: ,,, | Performed by: INTERNAL MEDICINE

## 2024-05-08 PROCEDURE — 84100 ASSAY OF PHOSPHORUS: CPT

## 2024-05-08 PROCEDURE — 94761 N-INVAS EAR/PLS OXIMETRY MLT: CPT

## 2024-05-08 PROCEDURE — 36415 COLL VENOUS BLD VENIPUNCTURE: CPT

## 2024-05-08 PROCEDURE — A4216 STERILE WATER/SALINE, 10 ML: HCPCS | Performed by: INTERNAL MEDICINE

## 2024-05-08 PROCEDURE — 20610 DRAIN/INJ JOINT/BURSA W/O US: CPT

## 2024-05-08 PROCEDURE — 85025 COMPLETE CBC W/AUTO DIFF WBC: CPT

## 2024-05-08 PROCEDURE — 36415 COLL VENOUS BLD VENIPUNCTURE: CPT | Mod: XB | Performed by: INTERNAL MEDICINE

## 2024-05-08 PROCEDURE — 63600175 PHARM REV CODE 636 W HCPCS

## 2024-05-08 PROCEDURE — 25000242 PHARM REV CODE 250 ALT 637 W/ HCPCS: Performed by: STUDENT IN AN ORGANIZED HEALTH CARE EDUCATION/TRAINING PROGRAM

## 2024-05-08 PROCEDURE — 25000003 PHARM REV CODE 250: Performed by: STUDENT IN AN ORGANIZED HEALTH CARE EDUCATION/TRAINING PROGRAM

## 2024-05-08 PROCEDURE — 85730 THROMBOPLASTIN TIME PARTIAL: CPT | Mod: 91 | Performed by: INTERNAL MEDICINE

## 2024-05-08 PROCEDURE — 83735 ASSAY OF MAGNESIUM: CPT

## 2024-05-08 PROCEDURE — 25000003 PHARM REV CODE 250: Performed by: INTERNAL MEDICINE

## 2024-05-08 RX ORDER — OSELTAMIVIR PHOSPHATE 30 MG/1
30 CAPSULE ORAL DAILY
Qty: 1 CAPSULE | Refills: 0 | Status: SHIPPED | OUTPATIENT
Start: 2024-05-09 | End: 2024-05-10

## 2024-05-08 RX ORDER — ISOSORBIDE DINITRATE AND HYDRALAZINE HYDROCHLORIDE 37.5; 2 MG/1; MG/1
1 TABLET ORAL 3 TIMES DAILY
Qty: 90 TABLET | Refills: 3 | Status: SHIPPED | OUTPATIENT
Start: 2024-05-08 | End: 2025-05-08

## 2024-05-08 RX ORDER — FAMOTIDINE 20 MG/1
20 TABLET, FILM COATED ORAL DAILY
Qty: 30 TABLET | Refills: 11 | Status: SHIPPED | OUTPATIENT
Start: 2024-05-09 | End: 2024-05-14

## 2024-05-08 RX ORDER — TIOTROPIUM BROMIDE 18 UG/1
18 CAPSULE ORAL; RESPIRATORY (INHALATION) DAILY
Qty: 30 CAPSULE | Refills: 5 | Status: SHIPPED | OUTPATIENT
Start: 2024-05-08

## 2024-05-08 RX ADMIN — HYDRALAZINE HYDROCHLORIDE: 10 TABLET, FILM COATED ORAL at 08:05

## 2024-05-08 RX ADMIN — FLUTICASONE PROPIONATE 100 MCG: 50 SPRAY, METERED NASAL at 08:05

## 2024-05-08 RX ADMIN — ASPIRIN 81 MG: 81 TABLET, COATED ORAL at 08:05

## 2024-05-08 RX ADMIN — OSELTAMIVIR PHOSPHATE 30 MG: 30 CAPSULE ORAL at 08:05

## 2024-05-08 RX ADMIN — BUMETANIDE 4 MG: 1 TABLET ORAL at 08:05

## 2024-05-08 RX ADMIN — ALLOPURINOL 300 MG: 100 TABLET ORAL at 08:05

## 2024-05-08 RX ADMIN — METOLAZONE 5 MG: 2.5 TABLET ORAL at 08:05

## 2024-05-08 RX ADMIN — Medication 10 ML: at 06:05

## 2024-05-08 RX ADMIN — AMIODARONE HYDROCHLORIDE 200 MG: 200 TABLET ORAL at 08:05

## 2024-05-08 RX ADMIN — TIOTROPIUM BROMIDE INHALATION SPRAY 2 PUFF: 3.12 SPRAY, METERED RESPIRATORY (INHALATION) at 08:05

## 2024-05-08 RX ADMIN — EMPAGLIFLOZIN 10 MG: 10 TABLET, FILM COATED ORAL at 08:05

## 2024-05-08 RX ADMIN — PROMETHAZINE HYDROCHLORIDE AND CODEINE PHOSPHATE 5 ML: 6.25; 1 SOLUTION ORAL at 03:05

## 2024-05-08 RX ADMIN — HEPARIN SODIUM AND DEXTROSE 15 UNITS/KG/HR: 10000; 5 INJECTION INTRAVENOUS at 06:05

## 2024-05-08 RX ADMIN — Medication 10 ML: at 12:05

## 2024-05-08 RX ADMIN — HEPARIN SODIUM AND DEXTROSE 17 UNITS/KG/HR: 10000; 5 INJECTION INTRAVENOUS at 08:05

## 2024-05-08 RX ADMIN — CETIRIZINE HYDROCHLORIDE 10 MG: 10 TABLET, FILM COATED ORAL at 08:05

## 2024-05-08 RX ADMIN — FAMOTIDINE 20 MG: 20 TABLET ORAL at 08:05

## 2024-05-08 NOTE — NURSING
Infusion nurse at the bedside. Patient changed over to her home dobutamine pump. Discharge instructions reviewed with the patient. Patient verbalized her understanding and denies any questions or concerns at this time. Patient preparing for discharge, awaiting patient transport.

## 2024-05-08 NOTE — PLAN OF CARE
[facility]  Initial Discharge Assessment       Primary Care Provider: Cristobal Ann MD    Admission Diagnosis: Acute on chronic heart failure [I50.9]    Admission Date: 5/5/2024  Expected Discharge Date: 5/8/2024         Payor: MEDICAID / [facility] / Product Type: Managed Medicaid /     Extended Emergency Contact Information  Primary Emergency Contact: Jaye Baum  Address: [address] [place] [address] [facility]  Home Phone: [phone]  Relation: Sister  Secondary Emergency Contact: ChandanJerrod  Mobile Phone: [phone]  Relation: Spouse    Discharge Plan A: Home  Discharge Plan B: Home with family      [facility] [address] [place] [address]  Phone: [phone] Fax: [phone]    [facility]  [address]  [facility] [address]  Phone: [phone] Fax: [phone]      Initial Assessment (most recent)       Adult Discharge Assessment - 05/08/24 1417          Discharge Assessment    Assessment Type Discharge Planning Assessment     Confirmed/corrected address, phone number and insurance Yes     Confirmed Demographics Correct on Facesheet     Source of Information patient     Communicated CHIQUI with patient/caregiver Date not available/Unable to determine     People in Home spouse     Do you expect to return to your current living situation? Yes     Do you have help at home or someone to help you manage your care at home? Yes     Who are your caregiver(s) and their phone number(s)? Jerrod Holcomb     Prior to hospitilization cognitive status: Alert/Oriented     Current cognitive status: Alert/Oriented     Walking or Climbing Stairs Difficulty yes     Walking or Climbing Stairs ambulation difficulty, requires equipment     Dressing/Bathing Difficulty no     Equipment Currently Used at Home walker, rolling     Do you currently have service(s) that help you manage your care at home? No      Do you take prescription medications? Yes     Do you have prescription coverage? Yes     Coverage MEDICAID - [facility] ([facility]     Is the patient taking medications as prescribed? yes     Who is going to help you get home at discharge? Jerrod Hawley     Are you on dialysis? No     Do you take coumadin? No     Discharge Plan A Home     Discharge Plan B Home with family     DME Needed Upon Discharge  none     Discharge Plan discussed with: Patient        Physical Activity    On average, how many days per week do you engage in moderate to strenuous exercise (like a brisk walk)? Patient declined     On average, how many minutes do you engage in exercise at this level? Patient declined        Financial Resource Strain    How hard is it for you to pay for the very basics like food, housing, medical care, and heating? Patient declined        Housing Stability    In the last 12 months, was there a time when you were not able to pay the mortgage or rent on time? Patient declined     At any time in the past 12 months, were you homeless or living in a shelter (including now)? Patient declined        Transportation Needs    Has the lack of transportation kept you from medical appointments, meetings, work or from getting things needed for daily living? Patient declined        Food Insecurity    Within the past 12 months, you worried that your food would run out before you got the money to buy more. Patient declined     Within the past 12 months, the food you bought just didn't last and you didn't have money to get more. Patient declined        Stress    Do you feel stress - tense, restless, nervous, or anxious, or unable to sleep at night because your mind is troubled all the time - these days? Patient declined        Social Isolation    How often do you feel lonely or isolated from those around you?  Patient declined        Alcohol Use    Q1: How often do you have a drink containing alcohol? Patient  declined     Q2: How many drinks containing alcohol do you have on a typical day when you are drinking? Patient declined     Q3: How often do you have six or more drinks on one occasion? Patient declined        Utilities    In the past 12 months has the electric, gas, oil, or water company threatened to shut off services in your home? Patient declined        Health Literacy    How often do you need to have someone help you when you read instructions, pamphlets, or other written material from your doctor or pharmacy? Patient declines to respond                      The SW met  with the patient to complete her DPA. The patient is current with [facility]. The patient uses a walker. The patient's  will be providing assistance/ help upon discharge. The patient's  will be providing a transportation.     Discharge Plan A and Plan B have been determined by review of patient's clinical status, future medical and therapeutic needs, and coverage/benefits for post-acute care in coordination with multidisciplinary team members.      Gordon Mendez LMSW  Case Management [facility]

## 2024-05-08 NOTE — DISCHARGE SUMMARY
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 ED for influenza and ADHF. She initially presented to the OSH due to hyperglycemic readings at home and feeling fatigue. Patient reports she has chronic cough, denied fever orthopnea or PND. Patient states she takes her bumex and metolazone as needed. She states PCP prescribed prednisone and she started day prior to admission. Patient reports she was given steroids to "boost her energy"    On arrival to outside ED labs were signficant for cr 2.6 (bl 2.2-2.6), BNP was noted to be 3000 (bl 500s), Trop 2.2*2.2 procal 0.19. lactic of 3    On arrival to [facility] she was found to be hemodynamically stable. Denies cp/chest discomfort but does report sore throat         * No surgery found *     Indwelling Lines/Drains at time of discharge:  Lines/Drains/Airways       Peripherally Inserted Central Catheter Line  Duration             PICC Double Lumen 05/12/23 1534 right brachial 362 days                    Hospital Course:  Patient was admitted to Hospital Medicine for medical management and evaluation of ADHF in the setting of influenzae infection. Patient responded well to initial diuresis, will transition to increased dose of home oral diuretic regimen. Oral anticoagulation held while inpatient pending any possible procedural intervention and was replaced with heparin gtt. Patient displayed intermittent, self-resolved runs of ventricular arrhythmia while inpatient with associated symptomatic palpitations; " "defibrillator in place and on VT dosed amiodarone. TTE 5/6/24 with stable EF of 15-20%, G2 diastolic dysfunction, severe LV increased mass and eccentric hypertrophy with continued severe global hypokinesis. Patient euvolemic prior to discharge. Patient experienced continued hyperglycemia throughout hospitalization requiring long acting and sliding scale insulin. Discontinuing steroid taper and discharging with instruction to return to outpatient glycemic controllers. Upon chart review, patient without major contraindication to full dose eliquis and does not fully meet criteria for dose reduction. Dose increase to be discussed at follow up. Patient also had entresto discontinued outpatient; will defer to outpatient follow-up for re-initiation of entresto for completion of full GDMT regimen.    Discharging with instructions to take an increased evening dose of bumex (4mg) if patient notices increased swelling, primarily abdominal, or patient gains 3 lbs in 1 day or 5 lbs in 1 week. Otherwise, continuing home diuretics with bumex 4mg qam/2mg qpm and metolazone 5mg QD. Transitioned off heparin gtt at discharge with patient instructed to resume prior dosing of eliquis. Will discharge with 1 day of oseltamivir to complete 5 day regimen. Will also coordinate continued follow-up with infusion care for continuous dobutamine infusion. All patient questions were answered, and patient expressed understanding.    Goals of Care Treatment Preferences:  Code Status: Full Code    Health care agent: Erika Estrada  [facility] care agent number: No value filed.          What is most important right now is to focus on curative/life-prolongation (regardless of treatment burdens), remaining as independent as possible, symptom/pain control.  Accordingly, we have decided that the best plan to meet the patient's goals includes continuing with treatment.    Objective:  /72   Pulse 94   Temp 98.4 °F (36.9 °C)   Resp 20   Ht 5' 6" " (1.676 m)   Wt 79.8 kg (175 lb 14.8 oz)   LMP 10/23/2001   SpO2 95%   BMI 28.40 kg/m²       Physical Exam  Vitals and nursing note reviewed.   Constitutional:       General: She is not in acute distress.     Appearance: Normal appearance. She is not ill-appearing.   HENT:      Mouth/Throat:      Mouth: Mucous membranes are moist.      Pharynx: Oropharynx is clear.   Eyes:      General: No scleral icterus.     Pupils: Pupils are equal, round, and reactive to light.   Cardiovascular:      Rate and Rhythm: Normal rate and regular rhythm.      Pulses: Normal pulses.   Pulmonary:      Effort: Pulmonary effort is normal.      Breath sounds: clear to auscultation bilaterally  Abdominal:      General: Abdomen is flat. There is distension.      Palpations: Abdomen is soft.      Tenderness: There is no abdominal tenderness.   Musculoskeletal:      Cervical back: Normal range of motion.      Right lower leg: No edema.      Left lower leg: No edema.   Skin:     General: Skin is warm and dry.   Neurological:      General: No focal deficit present.      Mental Status: She is alert and oriented to person, place, and time.   Psychiatric:         Mood and Affect: Mood normal.         Behavior: Behavior normal.     Consults:   Consults (From admission, onward)          Status Ordering Provider     Inpatient consult to Registered Dietitian/Nutritionist  Once        Provider:  (Not yet assigned)    Completed DEEPTI MITCHELL            Significant Diagnostic Studies: Labs: CMP   Recent Labs   Lab 05/07/24  0457 05/08/24  0814   * 134*   K 4.3 4.5   CL 96 96   CO2 23 24   * 125*   BUN 96* 102*   CREATININE 3.5* 3.5*   CALCIUM 9.3 9.9   PROT 6.5 6.8   ALBUMIN 3.7 3.9   BILITOT 1.6* 1.8*   ALKPHOS 44* 44*   AST 13 15   ALT 12 10   ANIONGAP 15 14   , CBC   Recent Labs   Lab 05/07/24  0457 05/08/24  0814   WBC 8.72 9.27   HGB 9.9* 10.6*   HCT 34.0* 35.0*    167   , Lipid Panel   Lab Results   Component Value Date     CHOL 136 01/04/2023    HDL 63 01/04/2023    LDLCALC 57.8 (L) 01/04/2023    TRIG 76 01/04/2023    CHOLHDL 46.3 01/04/2023   , Troponin   Recent Labs   Lab 05/04/24  1807   TROPONINI 2.235*   , and All labs within the past 24 hours have been reviewed    Pending Diagnostic Studies:       None          Significant Imaging: All significant imaging from the last 24 hours has been reviewed.     TTE 5/6/24    Left Ventricle: The left ventricle is severely dilated. Severely increased ventricular mass. Normal wall thickness. There is severe eccentric hypertrophy. Severe global hypokinesis present. There is severely reduced systolic function with a visually estimated ejection fraction of 10 -15%. Grade II diastolic dysfunction. Elevated left ventricular filling pressure. No thrombus present.    Right Ventricle: Normal right ventricular cavity size. Wall thickness is normal. Right ventricle wall motion  is normal. Systolic function is mildly reduced. Pacemaker lead present in the ventricle.    Left Atrium: Left atrium is severely dilated.    Right Atrium: Normal right atrial size.    Aortic Valve: The aortic valve is a trileaflet valve. There is mild annular calcification present. There is mild aortic regurgitation.    Mitral Valve: There is moderate to severe regurgitation with an eccentric jet and a wall hugging jet.    Tricuspid Valve: There is mild regurgitation.    Pulmonic Valve: There is mild regurgitation.    Aorta: Aortic root is normal in size measuring 2.75 cm. Ascending aorta is normal measuring 3.49 cm.    Pulmonary Artery: The estimated pulmonary artery systolic pressure is 44 mmHg.    IVC/SVC: Normal venous pressure at 3 mmHg.    Final Active Diagnoses:    Diagnosis Date Noted POA    PRINCIPAL PROBLEM:  Influenza A [J10.1] 05/05/2024 Yes    Paroxysmal A-fib [I48.0] 06/28/2023 Yes    Gout, arthritis [M10.9] 06/06/2023 Yes    COPD exacerbation [J44.1] 04/27/2023 Yes    CKD (chronic kidney disease), stage IV  [N18.4] 08/23/2022 Yes     Chronic    Acute on chronic combined systolic and diastolic heart failure [I50.43] 11/30/2021 Yes    Essential hypertension [I10] 07/22/2016 Yes    Microcytic anemia [D50.9] 07/20/2016 Yes      Problems Resolved During this Admission:     No new Assessment & Plan notes have been filed under this hospital service since the last note was generated.  Service: Cardiology      Discharged Condition: stable    Disposition: Home or Self Care    Follow Up:   Follow-up Information       [facility]. Schedule an appointment as soon as possible for a visit in 1 week(s).    Specialty: Cardiology  Contact information:  [facility]  [facility] [id number] [phone]             [id number]. Schedule an appointment as soon as possible for a visit in 1 week(s).    Specialty: Cardiology  Contact information:  [address]  [facility] [phone]  Additional information:  Cardiology Services Clinics - Main Building, Atrium 3rd Floor      Please park in [facility] and use Atrium elevator                         Patient Instructions:      Ambulatory referral/consult to Cardiac Electrophysiology   Standing Status: Future   Referral Priority: Routine Referral Type: Consultation   Referral Reason: Specialty Services Required   Requested Specialty: Cardiology   Number of Visits Requested: 1     Ambulatory referral/consult to Heart Failure Transitional [facility]   Standing Status: Future   Referral Priority: Routine Referral Type: Consultation   Referral Reason: Specialty Services Required   Requested Specialty: Cardiology   Number of Visits Requested: 1     Medications:  Reconciled Home Medications:      Medication List        START taking these medications      isosorbide-hydrALAZINE 20-37.5 mg 20-37.5 mg Tab  Commonly known as: BIDIL  Take 1 tablet by mouth 3 (three) times daily.     oseltamivir 30 MG capsule  Commonly known  as: TAMIFLU  Take 1 capsule (30 mg total) by mouth once daily. for 1 dose  Start taking on: May 9, 2024            CHANGE how you take these medications      famotidine 20 MG tablet  Commonly known as: PEPCID  Take 1 tablet (20 mg total) by mouth once daily.  Start taking on: May 9, 2024  What changed:   medication strength  how much to take  when to take this            CONTINUE taking these medications      albuterol-ipratropium 2.5 mg-0.5 mg/3 mL nebulizer solution  Commonly known as: DUO-NEB  Take 3 mLs by nebulization every 6 (six) hours as needed for Wheezing or Shortness of Breath.     allopurinoL 300 MG tablet  Commonly known as: ZYLOPRIM  Take 1 tablet (300 mg total) by mouth once daily.     ALPRAZolam 2 MG Tab  Commonly known as: XANAX  Take 1 tablet orally 2 times a day.     amiodarone 200 MG Tab  Commonly known as: PACERONE  take one tablet by mouth twice a day     aspirin 81 MG EC tablet  Commonly known as: ECOTRIN  Take 81 mg by mouth once daily.     atorvastatin 40 MG tablet  Commonly known as: LIPITOR  Take 1 tablet (40 mg total) by mouth every evening.     bumetanide 2 MG tablet  Commonly known as: BUMEX  Take 2 tablets (4mg total) by mouth in morning and take 1 tablet (2mg total) by mouth in the evening (no later then 5pm).     cetirizine 10 MG tablet  Commonly known as: ZyrTEC  Take 1 tablet (10 mg total) by mouth once daily.     DEXCOM G7 SENSOR Evon  Generic drug: blood-glucose sensor  change sensor every 10 days.     DOBUTamine 1,000 mg/250 mL (4,000 mcg/mL) infusion  Commonly known as: DOBUTREX  Inject 409 mcg/min into the vein continuous.     ELIQUIS 2.5 mg Tab  Generic drug: apixaban  Take 1 tablet (2.5 mg total) by mouth 2 (two) times daily.     fluticasone propionate 50 mcg/actuation nasal spray  Commonly known as: FLONASE ALLERGY RELIEF  2 sprays (100 mcg total) by Each Nostril route once daily.     HYDROcodone-acetaminophen  mg per tablet  Commonly known as: NORCO  Take 1 tablet by  mouth every 6 (six) hours as needed for Pain.     JARDIANCE 25 mg tablet  Generic drug: empagliflozin  Take 1 tablet orally once a day.     LIDOcaine 5 %  Commonly known as: LIDODERM  Place 1 patch onto the skin once daily. Remove & discard patch within 12 hours or as directed by MD.     magnesium oxide 400 mg (241.3 mg magnesium) tablet  Commonly known as: MAG-OX  Take 1 tablet (400 mg total) by mouth every evening.     metOLazone 5 MG tablet  Commonly known as: ZAROXOLYN  Take 1 tablet orally once a day.     ondansetron 8 MG Tbdl  Commonly known as: ZOFRAN-ODT  Dissolve 1 tablet (8 mg total) by mouth every 8 (eight) hours as needed (nausea).     OZEMPIC 1 mg/dose (4 mg/3 mL)  Generic drug: semaglutide  Inject 1 mg into the skin every 7 days.     pregabalin 100 MG capsule  Commonly known as: LYRICA  Take 1 capsule (100 mg total) by mouth every evening.     promethazine-codeine 6.25-10 mg/5 ml 6.25-10 mg/5 mL syrup  Commonly known as: PHENERGAN with CODEINE  Take 5 mL orally every 6 hours as needed.     SPIRIVA WITH HANDIHALER 18 mcg inhalation capsule  Generic drug: tiotropium  Inhale 1 capsule (18 mcg total) into the lungs once daily.     VENTOLIN HFA 90 mcg/actuation inhaler  Generic drug: albuterol  Inhale 2 puffs into the lungs every 6 (six) hours as needed for Shortness of Breath or Wheezing.     VITAMIN D2 1,250 mcg (50,000 unit) capsule  Generic drug: ergocalciferol  Take 1 capsule orally once a  week            STOP taking these medications      ENTRESTO 24-26 mg per tablet  Generic drug: sacubitriL-valsartan     predniSONE 20 MG tablet  Commonly known as: DELTASONE     predniSONE 5 MG tablet  Commonly known as: DELTASONE     predniSONE 50 MG Tab  Commonly known as: DELTASONE              Time spent on the discharge of patient: 35 minutes    Barrie Mustafa MD  Cardiology  [facility]

## 2024-05-08 NOTE — PLAN OF CARE
Problem: Adult Inpatient Plan of Care  Goal: Plan of Care Review  Outcome: Progressing     Problem: Adult Inpatient Plan of Care  Goal: Patient-Specific Goal (Individualized)  Outcome: Progressing   VSS. Denies any pain. Dobutamine and Heparin infusing. Patient had two episodes of Vtach overnight, one run of 7 beats and one run of 24 beats, MD made aware. Droplet precautions maintained.

## 2024-05-08 NOTE — TELEPHONE ENCOUNTER
Attempted to contact [facility] back- no number left for return call in message    Tried pts number na nvm

## 2024-05-08 NOTE — TELEPHONE ENCOUNTER
----- Message from Flores Carter sent at 2024 11:41 AM CDT -----  Contact: PATIENT  Hafsa Hawley  MRN: [id number]  : 1965  PCP: Cristobal Ann  Home Phone      [phone]  Work Phone      Not on file.  Mobile          [phone]      MESSAGE: Patient has an appointment this afternoon but is needing to reschedule it because she is in the hospital.        Phone: [phone]

## 2024-05-08 NOTE — PLAN OF CARE
"[address] - Transplant Stepdown      HOME HEALTH ORDERS  FACE TO FACE ENCOUNTER    Patient Name: Hafsa Hawley  YOB: 1965    PCP: Cristobal Ann MD   PCP Address: [address] [place] [address] / [facility] [address]  PCP Phone Number: [phone]  PCP Fax: [phone]    Encounter Date: 5/4/24    Admit to Home Health    Diagnoses:  Active Hospital Problems    Diagnosis  POA    Influenza A [J10.1]  Yes    Paroxysmal A-fib [I48.0]  Yes    Gout, arthritis [M10.9]  Yes    COPD exacerbation [J44.1]  Yes    CKD (chronic kidney disease), stage IV [N18.4]  Yes     Chronic    Acute on chronic combined systolic and diastolic heart failure [I50.43]  Yes    Essential hypertension [I10]  Yes    Microcytic anemia [D50.9]  Yes      Resolved Hospital Problems   No resolved problems to display.       Follow Up Appointments:  Future Appointments   Date Time Provider Department Center   5/8/2024  2:00 PM Uziel Herman MD [facility] ENDOCR [place] [facility]   5/13/2024 12:30 PM CHAIR 01 ST[facility] CHEMO [facility]   5/20/2024 12:30 PM CHAIR 01 [facility] CHEMO [facility]   5/21/2024 10:00 AM Terese Patel MD [facility] RHEUM [place]   5/21/2024  2:15 PM Siddhartha Davalos Jr., DPM DESC [facility] [place]   5/27/2024 12:30 PM CHAIR 03 STAH STAH CHEMO [facility]   6/26/2024  3:30 PM Cristobal Ann MD [facility]   8/23/2024 10:50 AM [facility] LAB [facility]   8/26/2024  1:00 PM Vijay Basurto MD [facility] NEPHRO [facility]       Allergies:  Review of patient's allergies indicates:   Allergen Reactions    Penicillins Hives and Other (See Comments)    Iodinated contrast media Nausea And Vomiting    Oxycodone-acetaminophen Other (See Comments)     Nausea, Dizziness, Anxiety.  "I don't like how it makes me feel."   Given Hydromorphone 0.5mg IVP  Without problems.  Other reaction(s): Other (See Comments)    Clonazepam Other (See Comments)    Diovan hct [valsartan-hydrochlorothiazide] Other (See Comments)     " Causes coughing    Iodine Other (See Comments)    Irinotecan      Pt has homozygosity for the TA7 promoter variant that places this individual at significantly increased risk for   severe neutropenia (grade 4) when treated with the standard dose of irinotecan (risk approximately 50%).   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 Metabolism of other drugs not listed here may also be impacted by UGT1A1 enzyme activity.       Tramadol Nausea And Vomiting and Other (See Comments)     Other reaction(s): Other (See Comments)    Valsartan Other (See Comments)       Medications: Review discharge medications with patient and family and provide education.    Current Facility-Administered Medications   Medication Dose Route Frequency Provider Last Rate Last Admin    albuterol inhaler 2 puff  2 puff Inhalation Q6H PRN Dena Monterroso MD        albuterol-ipratropium 2.5 mg-0.5 mg/3 mL nebulizer solution 3 mL  3 mL Nebulization Q6H PRN Dena Monterroso MD        allopurinoL tablet 300 mg  300 mg Oral Daily Dena Monterroso MD   300 mg at 05/08/24 0820    amiodarone tablet 200 mg  200 mg Oral BID Dena Monterroso MD   200 mg at 05/08/24 0820    aspirin EC tablet 81 mg  81 mg Oral Daily Dena Monterroso MD   81 mg at 05/08/24 0820    atorvastatin tablet 40 mg  40 mg Oral QHS Dena Monterroso MD   40 mg at 05/07/24 2019    bumetanide tablet 4 mg  4 mg Oral BID Barrie Kowalski MD   4 mg at 05/08/24 0821    cetirizine tablet 10 mg  10 mg Oral Daily Dena Monterroso MD   10 mg at 05/08/24 0820    dextrose 10% bolus 125 mL 125 mL  12.5 g Intravenous PRN Dena Monterroso MD        dextrose 10% bolus 250 mL 250 mL  25 g Intravenous PRN Dena Monterroso MD        DOBUtamine 1000 mg in D5W 250 mL  infusion  5 mcg/kg/min Intravenous Continuous Dena Monterroso MD 6.1 mL/hr at 05/07/24 2233 5 mcg/kg/min at 05/07/24 2233    empagliflozin (Jardiance) tablet 10 mg  10 mg Oral Daily Dena Monterroso MD   10 mg at 05/08/24 0820    famotidine tablet 20 mg  20 mg Oral Daily Dena Monterroso MD   20 mg at 05/08/24 0820    fluticasone propionate 50 mcg/actuation nasal spray 100 mcg  2 spray Each Nostril Daily Dena Monterroso MD   100 mcg at 05/08/24 0821    glucagon (human recombinant) injection 1 mg  1 mg Intramuscular PRN Dena Monterroso MD        glucose chewable tablet 16 g  16 g Oral PRN Dena Monterroso MD        glucose chewable tablet 24 g  24 g Oral PRN Dena Monterroso MD        heparin 25,000 units in dextrose 5% (100 units/ml) IV bolus from bag LOW INTENSITY nomogram - [facility]  60 Units/kg (Adjusted) Intravenous PRN Barrie Mustafa MD   4,090 Units at 05/06/24 2325    heparin 25,000 units in dextrose 5% (100 units/ml) IV bolus from bag LOW INTENSITY nomogram - [facility]  30 Units/kg (Adjusted) Intravenous PRN Barrie Mustafa MD   2,040 Units at 05/08/24 0837    heparin 25,000 units in dextrose 5% 250 mL (100 units/mL) infusion LOW INTENSITY nomogram - [facility]  0-40 Units/kg/hr (Adjusted) Intravenous Continuous Barrie Mustafa MD 11.6 mL/hr at 05/08/24 0837 17 Units/kg/hr at 05/08/24 0837    hydrALAZINE 10 mg 1 tablet, hydrALAZINE 25 mg 1 tablet, isorsorbide dinitrate 20 mg 1 tablet combination   Oral TID Dena Monterroso MD   Given at 05/08/24 0820    insulin aspart U-100 pen 0-5 Units  0-5 Units Subcutaneous QID (AC + HS) PRN Dena Monterroso MD   3 Units at 05/07/24 2021    insulin glargine U-100 (Lantus) pen 3 Units  3 Units Subcutaneous QHS , MD Ubaldo   3 Units at 05/07/24 2145    metOLazone tablet 5 mg  5 mg Oral Daily Barrie Mustafa MD   5 mg at 05/08/24 0820    ondansetron disintegrating tablet 8 mg  8  mg Oral Q8H PRN Dena Monterroso MD   8 mg at 05/05/24 0916    oseltamivir capsule 30 mg  30 mg Oral Daily Dena Monterroso MD   30 mg at 05/08/24 0820    pregabalin capsule 100 mg  100 mg Oral QHS Dena Monterroso MD   100 mg at 05/07/24 2019    promethazine-codeine 6.25-10 mg/5 ml syrup 5 mL  5 mL Oral Q4H PRN Dena Monterroso MD   5 mL at 05/07/24 2232    sodium chloride 0.9% flush 10 mL  10 mL Intravenous PRN Juana Beatty MD        sodium chloride 0.9% flush 10 mL  10 mL Intravenous Q6H Charbel Cordova MD   10 mL at 05/08/24 0600    And    sodium chloride 0.9% flush 10 mL  10 mL Intravenous PRN Charbel Cordova MD        tiotropium bromide 2.5 mcg/actuation inhaler 2 puff  2 puff Inhalation Daily Dena Monterroso MD   2 puff at 05/08/24 0822     Facility-Administered Medications Ordered in Other Encounters   Medication Dose Route Frequency Provider Last Rate Last Admin    0.9%  NaCl infusion   Intravenous Continuous Corinna Hayes, NP   New Bag at 05/23/19 0745     Current Discharge Medication List        START taking these medications    Details   isosorbide-hydrALAZINE 20-37.5 mg (BIDIL) 20-37.5 mg Tab Take 1 tablet by mouth 3 (three) times daily.  Qty: 90 tablet, Refills: 3      oseltamivir (TAMIFLU) 30 MG capsule Take 1 capsule (30 mg total) by mouth once daily. for 1 dose  Qty: 1 capsule, Refills: 0           CONTINUE these medications which have CHANGED    Details   famotidine (PEPCID) 20 MG tablet Take 1 tablet (20 mg total) by mouth once daily.  Qty: 30 tablet, Refills: 11      SPIRIVA WITH HANDIHALER 18 mcg inhalation capsule Inhale 1 capsule (18 mcg total) into the lungs once daily.  Qty: 30 capsule, Refills: 5           CONTINUE these medications which have NOT CHANGED    Details   albuterol-ipratropium (DUO-NEB) 2.5 mg-0.5 mg/3 mL nebulizer solution Take 3 mLs by nebulization every 6 (six) hours as needed for  Wheezing or Shortness of Breath.  Qty: 90 mL, Refills: 2      allopurinoL (ZYLOPRIM) 300 MG tablet Take 1 tablet (300 mg total) by mouth once daily.  Qty: 90 tablet, Refills: 1    Associated Diagnoses: Gout, arthritis      ALPRAZolam (XANAX) 2 MG Tab Take 1 tablet orally 2 times a day.  Qty: 60 tablet, Refills: 4      amiodarone (PACERONE) 200 MG Tab take one tablet by mouth twice a day  Qty: 60 tablet, Refills: 4      apixaban (ELIQUIS) 2.5 mg Tab Take 1 tablet (2.5 mg total) by mouth 2 (two) times daily.  Qty: 60 tablet, Refills: 12      aspirin (ECOTRIN) 81 MG EC tablet Take 81 mg by mouth once daily.      atorvastatin (LIPITOR) 40 MG tablet Take 1 tablet (40 mg total) by mouth every evening.  Qty: 90 tablet, Refills: 3    Associated Diagnoses: Mixed hyperlipidemia      blood-glucose sensor (DEXCOM G7 SENSOR) Evon change sensor every 10 days.  Qty: 3 each, Refills: 11    Associated Diagnoses: Type 2 diabetes mellitus with stage 4 chronic kidney disease, without long-term current use of insulin      bumetanide (BUMEX) 2 MG tablet Take 2 tablets (4mg total) by mouth in morning and take 1 tablet (2mg total) by mouth in the evening (no later then 5pm).  Qty: 90 tablet, Refills: 5    Comments: .      cetirizine (ZYRTEC) 10 MG tablet Take 1 tablet (10 mg total) by mouth once daily.  Qty: 30 tablet, Refills: 12      DOBUTamine (DOBUTREX) 1,000 mg/250 mL (4,000 mcg/mL) infusion Inject 409 mcg/min into the vein continuous.      empagliflozin (JARDIANCE) 25 mg tablet Take 1 tablet orally once a day.  Qty: 30 tablet, Refills: 12      ergocalciferol (VITAMIN D2) 50,000 unit Cap Take 1 capsule orally once a  week  Qty: 4 capsule, Refills: 11      fluticasone propionate (FLONASE ALLERGY RELIEF) 50 mcg/actuation nasal spray 2 sprays (100 mcg total) by Each Nostril route once daily.  Qty: 16 g, Refills: 12      HYDROcodone-acetaminophen (NORCO)  mg per tablet Take 1 tablet by mouth every 6 (six) hours as needed for  Pain.  Qty: 120 tablet, Refills: 0    Comments: Quantity prescribed more than 7 day supply? Yes, quantity medically necessary  Associated Diagnoses: Pain      LIDOcaine (LIDODERM) 5 % Place 1 patch onto the skin once daily. Remove & discard patch within 12 hours or as directed by MD.  Qty: 30 patch, Refills: 1      magnesium oxide (MAG-OX) 400 mg (241.3 mg magnesium) tablet Take 1 tablet (400 mg total) by mouth every evening.  Qty: 30 tablet, Refills: 5    Associated Diagnoses: Nocturnal leg cramps      metOLazone (ZAROXOLYN) 5 MG tablet Take 1 tablet orally once a day.  Qty: 90 tablet, Refills: 4      ondansetron (ZOFRAN-ODT) 8 MG TbDL Dissolve 1 tablet (8 mg total) by mouth every 8 (eight) hours as needed (nausea).  Qty: 30 tablet, Refills: 4    Associated Diagnoses: Nausea      pregabalin (LYRICA) 100 MG capsule Take 1 capsule (100 mg total) by mouth every evening.  Qty: 30 capsule, Refills: 2    Associated Diagnoses: Neuropathy      promethazine-codeine 6.25-10 mg/5 ml (PHENERGAN WITH CODEINE) 6.25-10 mg/5 mL syrup Take 5 mL orally every 6 hours as needed.  Qty: 118 mL, Refills: 0    Comments: Quantity prescribed more than 7 day supply? Press F2 and select one:[id number]        semaglutide (OZEMPIC) 1 mg/dose (4 mg/3 mL) Inject 1 mg into the skin every 7 days.  Qty: 3 mL, Refills: 11    Associated Diagnoses: Type 2 diabetes mellitus with stage 4 chronic kidney disease, with long-term current use of insulin      VENTOLIN HFA 90 mcg/actuation inhaler Inhale 2 puffs into the lungs every 6 (six) hours as needed for Shortness of Breath or Wheezing.  Qty: 18 g, Refills: 11           STOP taking these medications       predniSONE (DELTASONE) 20 MG tablet Comments:   Reason for Stopping:         predniSONE (DELTASONE) 5 MG tablet Comments:   Reason for Stopping:         predniSONE (DELTASONE) 50 MG Tab Comments:   Reason for Stopping:         sacubitriL-valsartan (ENTRESTO) 24-26 mg per tablet Comments:   Reason for  Stopping:                 I have seen and examined this patient within the last 30 days. My clinical findings that support the need for the home health skilled services and home bound status are the following:no   Weakness/numbness causing balance and gait disturbance due to Heart Failure making it taxing to leave home.  Requiring assistive device to leave home due to unsteady gait caused by  Heart Failure.     Diet:   cardiac diet and diabetic diet 2000 calorie    Labs:  Per home health organization    Referrals/ Consults  Aide to provide assistance with personal care, ADLs, and vital signs.    Activities:   activity as tolerated    Nursing:   Agency to admit patient within 24 hours of hospital discharge unless specified on physician order or at patient request    Ok to schedule additional visits based on staff availability and patient request on consecutive days within the home health episode.    When multiple disciplines ordered:    Start of Care occurs on Sunday - Wednesday schedule remaining discipline evaluations as ordered on separate consecutive days following the start of care.    Thursday SOC -schedule subsequent evaluations Friday and Monday the following week.     Friday - Saturday SOC - schedule subsequent discipline evaluations on consecutive days starting Monday of the following week.    For all post-discharge communication and subsequent orders please contact patient's primary care physician.     Miscellaneous   Home Infusion Therapy:   SN to perform Infusion Therapy/Central Line Care.  Review Central Line Care & Central Line Flush with patient.    Administer (drug and dose): Dobutamine continuous infusion @ 5 mcg/kg/min x81.8 kg (6.1mL/hr)  Last dose given: @ discharge                         Home dose due: upon return home    Scrub the Hub: Prior to accessing the line, always perform a 30 second alcohol scrub  Each lumen of the central line is to be flushed at least daily with 10 mL Normal Saline  and 3 mL Heparin flush (10 units/mL)  Skilled Nurse (SN) may draw blood from IV access  Blood Draw Procedure:   - Aspirate at least 5 mL of blood   - Discard   - Obtain specimen   - Change injection cap   - Flush with 20 mL Normal Saline followed by a                 3-5 mL Heparin flush (10 units/mL)  Central :   - Sterile dressing changes are done weekly and as needed.   - Use chlor-hexadine scrub to cleanse site, apply Biopatch to insertion site,       apply securement device dressing   - Injection caps are changed weekly and after EVERY lab draw.   - If sterile gauze is under dressing to control oozing,                 dressing change must be performed every 24 hours until gauze is not needed.  Diabetic Care:   SN to perform and educate Diabetic management with blood glucose monitoring:, Fingerstick blood sugar a.m. and p.m., and Report CBG < 60 or > 350 to physician.  Heart Failure:      SN to instruct on the following:    Instruct on the definition of CHF.   Instruct on the signs/sympoms of CHF to be reported.   Instruct on and monitor daily weights.   Instruct on factors that cause exacerbation.   Instruct on action, dose, schedule, and side effects of medications.   Instruct on diet as prescribed.   Instruct on activity allowed.   Instruct on life-style modifications for life long management of CHF   SN to assess compliance with daily weights, diet, medications, fluid retention,    safety precautions, activities permitted and life-style modifications.   Additional 1-2 SN visits per week as needed for signs and symptoms     of CHF exacerbation.      For Weight Gain > 2-3 lbs in 1 day or 4-6 lbs over 1 week notify PCP:  CHF DIURETIC SLIDING SCALE     Skilled nurse visits daily to instruct and monitor medication adherence until target weight. Then resume prior order frequency.     If weight gain exceeds 5 lbs over target weight, call MD  If weight gain of 3-4 lbs over target weight, then:      Increase Bumetanide - Current dose (mg/day) to 4mg in the morning and 4mg in the afternoon  double dose  and frequency of twice daily until target weight achieved or maximum 3 days.     If already on max oral daily dose, see IV diuretic instructions.    After 3 days of increased oral diuretic dose, get BMP. If patient is on increased oral diuretic dose greater than 5 days, repeat BMP at day 7 of increased dose.     Potassium supplementation   Scr > 1.5 mg/dl  Scr  1.5 mg/dl    K < 3.0 - NOTIFY MD and 40 mEq bid  40 mEq tid   K- 3.1-3.3  20 mEq bid  20 mEq tid    K 3.4-3.7  10 mEq bid  10 mEq tid      If target weight not reached after 5 days of increased oral diuretic, proceed to IV diuretic with daily patient contact to include face-to-face visit and telephone encounters.       Home Health Aide:  Infusion assist    Wound Care Orders  no    I certify that this patient is confined to her home and needs intermittent skilled nursing care.

## 2024-05-08 NOTE — TELEPHONE ENCOUNTER
Patient contacted and she is still in the hospital at this time. She will call when she is feeling better to reschedule.

## 2024-05-08 NOTE — PLAN OF CARE
05/08/24 1421   Post-Acute Status   Post-Acute Authorization Home Health;IV Infusion   Home Health Status Patient declined/refused   IV Infusion Status Referral(s) sent     The SW met with the patient at bedside to discuss recs for home health and IV. The patient reported that she is not interested in home health services at this time. The patient confirmed that she is current with [facility] and goes to [facility] for linecare and labs.     The SW sent a referral to [facility] via [facility] to review.        The SW contacted [facility] to follow-up regarding this patient's pending d/c and need for   before discharge. The representative informed the SW that their pharmacy was making a new bag and she was going to bring it to the hospital before the patient is discharged.    The SW sent the patient's care team a secure chat with the above information .       2:28 PM  The SW contacted [facility] to confirm receipt of this patient's orders. The representative reported that [facility] had received the updated orders and that their nurse will be coming out to provide this patient's medication at bedside.     2:50 PM  The SW tried to schedule this patient's hospital follow-up appt but was unable to due scheduling. The representative informed the SW that she was going to send a message to the MD's nurse regarding scheduling this patient a follow-up appt.     Gordon Mendez LMSW  Case Management [facility]

## 2024-05-08 NOTE — TELEPHONE ENCOUNTER
----- Message from Gustavo Allen sent at 2024  2:50 PM CDT -----  Contact: [facility]  Hafsa Hawley  MRN: [id number]  : 1965  PCP: Cristobal Ann  Home Phone      [phone]  Work Phone      Not on file.  Mobile          [phone]      MESSAGE:   Patient is asking for a hospital follow up sooner than apt time given, please advise patient was in the hospital for the flu      [phone]

## 2024-05-08 NOTE — PLAN OF CARE
TONYA contacted [facility] to get a status of her care.   The facility indicated that she has appointments on either Monday or Wednesdays.  Her current doctor is Dr. Cortez.

## 2024-05-09 NOTE — PLAN OF CARE
05/09/24 0838   Post-Acute Status   Post-Acute Authorization IV Infusion   IV Infusion Status Set-up Complete/Auth obtained     The patient has been set-up with [facility].    Gordon Mendez LMSW  Case Management [facility]

## 2024-05-09 NOTE — PLAN OF CARE
[facility] - Trans[facility] Stepdown  Discharge Final Note    Primary Care Provider: Cristobal Ann MD    Expected Discharge Date: 5/8/2024    Final Discharge Note (most recent)       Final Note - 05/09/24 0839          Final Note    Assessment Type Final Discharge Note     Anticipated Discharge Disposition Home or Self Care     Hospital Resources/Appts/Education Provided Provided patient/caregiver with written discharge plan information   This information was provided by the patient's b/c nurse       Post-Acute Status    Post-Acute Authorization IV Infusion;Home Health     Home Health Status Patient declined/refused     IV Infusion Status Set-up Complete/Auth obtained                     Important Message from Medicare        The patient has been set-up with [facility]. The patient goes to [facility] for Line Care.       Gordon Mendez LMSW  Case Management [facility]

## 2024-05-10 ENCOUNTER — TELEPHONE (OUTPATIENT)
Dept: FAMILY MEDICINE | Facility: CLINIC | Age: 59
End: 2024-05-10

## 2024-05-10 ENCOUNTER — INFUSION (OUTPATIENT)
Dept: INFUSION THERAPY | Facility: HOSPITAL | Age: 59
End: 2024-05-10
Attending: FAMILY MEDICINE
Payer: MEDICAID

## 2024-05-10 VITALS
HEART RATE: 103 BPM | TEMPERATURE: 97 F | DIASTOLIC BLOOD PRESSURE: 65 MMHG | RESPIRATION RATE: 22 BRPM | SYSTOLIC BLOOD PRESSURE: 103 MMHG

## 2024-05-10 LAB
BACTERIA BLD CULT: NORMAL
BACTERIA BLD CULT: NORMAL

## 2024-05-10 NOTE — PROGRESS NOTES
Right upper arm PICC line dressing change done using sterile technique.  No signs of infection noted.  Stat lock device, bio patch, and tegaderm dressing reapplied.  Tolerated well   dc to home

## 2024-05-10 NOTE — TELEPHONE ENCOUNTER
She needs to continue medications as directed on her hospital discharge med rec until I am able to see her for follow-up.

## 2024-05-10 NOTE — TELEPHONE ENCOUNTER
Received call from pt, states she is having issues with her port placement access and is scheduled to see infusion center today to have port evaluated. Pt has r/s hospital f/u appt for 05/14 instead. Pt also wanting to know what she is supposed to continue taking for her diabetes. Pt unsure of medication names and waiting to address at visit today buy infusion center closes at 1 pm today.    Please advise. Thanks

## 2024-05-13 ENCOUNTER — TELEPHONE (OUTPATIENT)
Dept: CARDIOLOGY | Facility: CLINIC | Age: 59
End: 2024-05-13
Payer: MEDICAID

## 2024-05-14 ENCOUNTER — OFFICE VISIT (OUTPATIENT)
Dept: FAMILY MEDICINE | Facility: CLINIC | Age: 59
End: 2024-05-14
Payer: MEDICAID

## 2024-05-14 ENCOUNTER — TELEPHONE (OUTPATIENT)
Dept: CARDIOLOGY | Facility: CLINIC | Age: 59
End: 2024-05-14
Payer: MEDICAID

## 2024-05-14 VITALS
WEIGHT: 176.38 LBS | DIASTOLIC BLOOD PRESSURE: 82 MMHG | HEIGHT: 66 IN | HEART RATE: 96 BPM | BODY MASS INDEX: 28.34 KG/M2 | RESPIRATION RATE: 20 BRPM | SYSTOLIC BLOOD PRESSURE: 118 MMHG

## 2024-05-14 DIAGNOSIS — R05.3 CHRONIC COUGH: ICD-10-CM

## 2024-05-14 DIAGNOSIS — Z09 HOSPITAL DISCHARGE FOLLOW-UP: Primary | ICD-10-CM

## 2024-05-14 DIAGNOSIS — K21.9 GASTROESOPHAGEAL REFLUX DISEASE, UNSPECIFIED WHETHER ESOPHAGITIS PRESENT: ICD-10-CM

## 2024-05-14 DIAGNOSIS — I50.42 CHRONIC COMBINED SYSTOLIC AND DIASTOLIC HEART FAILURE: Chronic | ICD-10-CM

## 2024-05-14 DIAGNOSIS — R06.02 SOB (SHORTNESS OF BREATH): Primary | ICD-10-CM

## 2024-05-14 DIAGNOSIS — I42.8 NICM (NONISCHEMIC CARDIOMYOPATHY): Chronic | ICD-10-CM

## 2024-05-14 PROCEDURE — 3044F HG A1C LEVEL LT 7.0%: CPT | Mod: CPTII,,, | Performed by: STUDENT IN AN ORGANIZED HEALTH CARE EDUCATION/TRAINING PROGRAM

## 2024-05-14 PROCEDURE — 4010F ACE/ARB THERAPY RXD/TAKEN: CPT | Mod: CPTII,,, | Performed by: STUDENT IN AN ORGANIZED HEALTH CARE EDUCATION/TRAINING PROGRAM

## 2024-05-14 PROCEDURE — 99999 PR PBB SHADOW E&M-EST. PATIENT-LVL IV: CPT | Mod: PBBFAC,,, | Performed by: STUDENT IN AN ORGANIZED HEALTH CARE EDUCATION/TRAINING PROGRAM

## 2024-05-14 PROCEDURE — 99214 OFFICE O/P EST MOD 30 MIN: CPT | Mod: PBBFAC | Performed by: STUDENT IN AN ORGANIZED HEALTH CARE EDUCATION/TRAINING PROGRAM

## 2024-05-14 PROCEDURE — 3066F NEPHROPATHY DOC TX: CPT | Mod: CPTII,,, | Performed by: STUDENT IN AN ORGANIZED HEALTH CARE EDUCATION/TRAINING PROGRAM

## 2024-05-14 PROCEDURE — 99496 TRANSJ CARE MGMT HIGH F2F 7D: CPT | Mod: S$PBB,,, | Performed by: STUDENT IN AN ORGANIZED HEALTH CARE EDUCATION/TRAINING PROGRAM

## 2024-05-14 PROCEDURE — 1159F MED LIST DOCD IN RCRD: CPT | Mod: CPTII,,, | Performed by: STUDENT IN AN ORGANIZED HEALTH CARE EDUCATION/TRAINING PROGRAM

## 2024-05-14 PROCEDURE — 3074F SYST BP LT 130 MM HG: CPT | Mod: CPTII,,, | Performed by: STUDENT IN AN ORGANIZED HEALTH CARE EDUCATION/TRAINING PROGRAM

## 2024-05-14 PROCEDURE — 3079F DIAST BP 80-89 MM HG: CPT | Mod: CPTII,,, | Performed by: STUDENT IN AN ORGANIZED HEALTH CARE EDUCATION/TRAINING PROGRAM

## 2024-05-14 PROCEDURE — 1111F DSCHRG MED/CURRENT MED MERGE: CPT | Mod: CPTII,,, | Performed by: STUDENT IN AN ORGANIZED HEALTH CARE EDUCATION/TRAINING PROGRAM

## 2024-05-14 RX ORDER — PANTOPRAZOLE SODIUM 40 MG/1
40 TABLET, DELAYED RELEASE ORAL DAILY
Qty: 90 TABLET | Refills: 3 | Status: SHIPPED | OUTPATIENT
Start: 2024-05-14 | End: 2025-05-14

## 2024-05-14 RX ORDER — BENZONATATE 100 MG/1
100 CAPSULE ORAL 3 TIMES DAILY PRN
Qty: 60 CAPSULE | Refills: 2 | Status: SHIPPED | OUTPATIENT
Start: 2024-05-14 | End: 2024-07-13

## 2024-05-14 NOTE — PROGRESS NOTES
Subjective:       Patient ID: Hafsa Hawley is a 58 y.o. female.    Chief Complaint: Hospital Follow Up (Pt here for hospital f/u. )    Pt here for hospital follow-up    She was admitted 05/05/2024-05/08/2024 for influenza and CHF exacerbation.  Hospital Course:  Patient was admitted to Hospital Medicine for medical management and evaluation of ADHF in the setting of influenzae infection. Patient responded well to initial diuresis, will transition to increased dose of home oral diuretic regimen. Oral anticoagulation held while inpatient pending any possible procedural intervention and was replaced with heparin gtt. Patient displayed intermittent, self-resolved runs of ventricular arrhythmia while inpatient with associated symptomatic palpitations; defibrillator in place and on VT dosed amiodarone. TTE 5/6/24 with stable EF of 15-20%, G2 diastolic dysfunction, severe LV increased mass and eccentric hypertrophy with continued severe global hypokinesis. Patient euvolemic prior to discharge. Patient experienced continued hyperglycemia throughout hospitalization requiring long acting and sliding scale insulin. Discontinuing steroid taper and discharging with instruction to return to outpatient glycemic controllers. Upon chart review, patient without major contraindication to full dose eliquis and does not fully meet criteria for dose reduction. Dose increase to be discussed at follow up. Patient also had entresto discontinued outpatient; will defer to outpatient follow-up for re-initiation of entresto for completion of full GDMT regimen.     Discharging with instructions to take an increased evening dose of bumex (4mg) if patient notices increased swelling, primarily abdominal, or patient gains 3 lbs in 1 day or 5 lbs in 1 week. Otherwise, continuing home diuretics with bumex 4mg qam/2mg qpm and metolazone 5mg QD. Transitioned off heparin gtt at discharge with patient instructed to resume prior dosing of eliquis.  Will discharge with 1 day of oseltamivir to complete 5 day regimen. Will also coordinate continued follow-up with infusion care for continuous dobutamine infusion. All patient questions were answered, and patient expressed understanding.    Transitional Care Note    Family and/or Caretaker present at visit?  No.  Diagnostic tests reviewed/disposition: I have reviewed all completed as well as pending diagnostic tests at the time of discharge.  Disease/illness education: DM2, CHF  Home health/community services discussion/referrals: Patient does not have home health established from hospital visit.  They do not need home health.  If needed, we will set up home health for the patient.   Establishment or re-establishment of referral orders for community resources: No other necessary community resources.   Discussion with other health care providers: No discussion with other health care providers necessary.     She reports she is still having issues with her blood glucose. She has some elevated glucose readings in the 300s but also is having some hypoglycemia episodes that are symptomatic in the 50-60s. Her recent A1c was 5.6.    She continues to complain of chronic cough. This affects her quality of life and ability to sleep. She has some mucus production. She is on chronic opioids.    She is also requesting to change from pepcid to protonix for better insurance coverage/lower cost.    Review of Systems   Constitutional:  Positive for activity change. Negative for chills and fever.   HENT:  Negative for congestion, rhinorrhea and sore throat.    Respiratory:  Positive for shortness of breath. Negative for cough.    Cardiovascular:  Negative for chest pain and palpitations.   Gastrointestinal:  Positive for nausea (chronic). Negative for abdominal pain, diarrhea and vomiting.   Genitourinary:  Negative for dysuria and hematuria.   Musculoskeletal:  Positive for arthralgias.   Skin:  Negative for rash.       Objective:       Physical Exam  Vitals reviewed.   HENT:      Head: Normocephalic and atraumatic.   Eyes:      Conjunctiva/sclera: Conjunctivae normal.   Cardiovascular:      Rate and Rhythm: Normal rate and regular rhythm.      Heart sounds: Normal heart sounds. No murmur heard.  Pulmonary:      Effort: Pulmonary effort is normal. No respiratory distress.      Breath sounds: Normal breath sounds.   Musculoskeletal:         General: No deformity.      Right lower leg: No edema.      Left lower leg: No edema.   Neurological:      Mental Status: She is alert and oriented to person, place, and time.   Psychiatric:         Mood and Affect: Mood normal.         Behavior: Behavior normal.         Assessment:       1. Hospital discharge follow-up    2. Chronic cough    3. NICM (nonischemic cardiomyopathy)    4. Chronic combined systolic and diastolic heart failure        Plan:           1. Hospital discharge follow-up    2. Chronic cough  -     benzonatate (TESSALON) 100 MG capsule; Take 1 capsule (100 mg total) by mouth 3 (three) times daily as needed for Cough.  Dispense: 60 capsule; Refill: 2    3. NICM (nonischemic cardiomyopathy)    4. Chronic combined systolic and diastolic heart failure  Overview:  Followed by cardiologist Dr. Benson Cortez, [facility]    Listed for transplant: No and no longer under consideration for advanced options      Considering her normal A1c and recent symptomatic hypoglycemia, I do not want to increase diabetes medications at this time. Cont glucose log  Cont other meds  Tessalon for cough  Follow-up cardiology as scheduled  Follow-up palliative care as scheduled  She denies need for home health  RTC for worsening symptoms or failure to improve, or RTC PRN/as scheduled

## 2024-05-14 NOTE — TELEPHONE ENCOUNTER
[facility] Phone Enrollment Complete.    Phone enrollment completed due to Patient discharged before enrollment completed    Called and spoke to  via phone. Introduced self to pt as [facility] nurse navigator.      Patient education will be Completed on initial visit       Reviewed the following key points of [facility] program with pt and family:              1.) Take your medications as directed.               2.) Weight yourself daily              3.) Follow low salt and limited fluid diet.               4.) Stop smoking and start exercising              5.) Go to your appointments and call your team.          Reviewed plan for follow up once discharged to include phone calls, in person and virtual visits to assist pt optimizing their heart failure medication regimen and encouraging healthy lifestyle modifications.  Reminded pt that program will assist them over the next 4-6 weeks and then patient will be transferred to long term care provider .  Reminded pt how to contact [facility] navigator via phone and or via [employer].      Pt given appointment today via [employer]     Pt also reminded RN will call 48-72 hours after discharge to check on them.      PT and family verbalize read back of information given.  Encouraged pt and family to read over information often and contact team with any questions or concerns.

## 2024-05-15 ENCOUNTER — OFFICE VISIT (OUTPATIENT)
Dept: ENDOCRINOLOGY | Facility: CLINIC | Age: 59
End: 2024-05-15
Payer: MEDICAID

## 2024-05-15 ENCOUNTER — INFUSION (OUTPATIENT)
Dept: INFUSION THERAPY | Facility: HOSPITAL | Age: 59
End: 2024-05-15
Attending: INTERNAL MEDICINE
Payer: MEDICAID

## 2024-05-15 VITALS
RESPIRATION RATE: 20 BRPM | DIASTOLIC BLOOD PRESSURE: 75 MMHG | TEMPERATURE: 98 F | HEART RATE: 97 BPM | SYSTOLIC BLOOD PRESSURE: 127 MMHG

## 2024-05-15 VITALS
WEIGHT: 173.38 LBS | SYSTOLIC BLOOD PRESSURE: 124 MMHG | DIASTOLIC BLOOD PRESSURE: 82 MMHG | HEIGHT: 66 IN | BODY MASS INDEX: 27.86 KG/M2 | HEART RATE: 96 BPM

## 2024-05-15 DIAGNOSIS — N18.4 CKD (CHRONIC KIDNEY DISEASE), STAGE IV: Chronic | ICD-10-CM

## 2024-05-15 DIAGNOSIS — N18.4 TYPE 2 DIABETES MELLITUS WITH STAGE 4 CHRONIC KIDNEY DISEASE, WITHOUT LONG-TERM CURRENT USE OF INSULIN: Primary | ICD-10-CM

## 2024-05-15 DIAGNOSIS — E11.22 TYPE 2 DIABETES MELLITUS WITH STAGE 4 CHRONIC KIDNEY DISEASE, WITHOUT LONG-TERM CURRENT USE OF INSULIN: Primary | ICD-10-CM

## 2024-05-15 DIAGNOSIS — I42.9 CARDIOMYOPATHY, UNSPECIFIED TYPE: Primary | ICD-10-CM

## 2024-05-15 DIAGNOSIS — E27.8 ADRENAL MASS 1 CM TO 4 CM IN DIAMETER: ICD-10-CM

## 2024-05-15 DIAGNOSIS — I50.42 CHRONIC COMBINED SYSTOLIC AND DIASTOLIC HEART FAILURE: Chronic | ICD-10-CM

## 2024-05-15 LAB
ALBUMIN SERPL BCP-MCNC: 3.9 G/DL (ref 3.5–5.2)
ALP SERPL-CCNC: 55 U/L (ref 55–135)
ALT SERPL W/O P-5'-P-CCNC: 14 U/L (ref 10–44)
ANION GAP SERPL CALC-SCNC: 15 MMOL/L (ref 8–16)
ANISOCYTOSIS BLD QL SMEAR: ABNORMAL
AST SERPL-CCNC: 19 U/L (ref 10–40)
BASOPHILS # BLD AUTO: 0.02 K/UL (ref 0–0.2)
BASOPHILS NFR BLD: 0.3 % (ref 0–1.9)
BILIRUB SERPL-MCNC: 1.9 MG/DL (ref 0.1–1)
BNP SERPL-MCNC: 815 PG/ML (ref 0–99)
BUN SERPL-MCNC: 89 MG/DL (ref 6–20)
CALCIUM SERPL-MCNC: 9.6 MG/DL (ref 8.7–10.5)
CHLORIDE SERPL-SCNC: 99 MMOL/L (ref 95–110)
CO2 SERPL-SCNC: 26 MMOL/L (ref 23–29)
CREAT SERPL-MCNC: 3.4 MG/DL (ref 0.5–1.4)
DACRYOCYTES BLD QL SMEAR: ABNORMAL
DIFFERENTIAL METHOD BLD: ABNORMAL
EOSINOPHIL # BLD AUTO: 0.1 K/UL (ref 0–0.5)
EOSINOPHIL NFR BLD: 0.9 % (ref 0–8)
ERYTHROCYTE [DISTWIDTH] IN BLOOD BY AUTOMATED COUNT: 30.2 % (ref 11.5–14.5)
EST. GFR  (NO RACE VARIABLE): 15 ML/MIN/1.73 M^2
GIANT PLATELETS BLD QL SMEAR: PRESENT
GLUCOSE SERPL-MCNC: 237 MG/DL (ref 70–110)
HCT VFR BLD AUTO: 37.3 % (ref 37–48.5)
HGB BLD-MCNC: 10.6 G/DL (ref 12–16)
HOWELL-JOLLY BOD BLD QL SMEAR: ABNORMAL
HYPOCHROMIA BLD QL SMEAR: ABNORMAL
IMM GRANULOCYTES # BLD AUTO: 0.12 K/UL (ref 0–0.04)
IMM GRANULOCYTES NFR BLD AUTO: 1.7 % (ref 0–0.5)
LYMPHOCYTES # BLD AUTO: 0.6 K/UL (ref 1–4.8)
LYMPHOCYTES NFR BLD: 8.3 % (ref 18–48)
MAGNESIUM SERPL-MCNC: 2 MG/DL (ref 1.6–2.6)
MCH RBC QN AUTO: 18.2 PG (ref 27–31)
MCHC RBC AUTO-ENTMCNC: 28.4 G/DL (ref 32–36)
MCV RBC AUTO: 64 FL (ref 82–98)
MONOCYTES # BLD AUTO: 0.6 K/UL (ref 0.3–1)
MONOCYTES NFR BLD: 8 % (ref 4–15)
NEUTROPHILS # BLD AUTO: 5.6 K/UL (ref 1.8–7.7)
NEUTROPHILS NFR BLD: 80.8 % (ref 38–73)
NRBC BLD-RTO: 2 /100 WBC
OVALOCYTES BLD QL SMEAR: ABNORMAL
PHOSPHATE SERPL-MCNC: 4.5 MG/DL (ref 2.7–4.5)
PLATELET # BLD AUTO: 177 K/UL (ref 150–450)
PLATELET BLD QL SMEAR: ABNORMAL
PMV BLD AUTO: ABNORMAL FL (ref 9.2–12.9)
POIKILOCYTOSIS BLD QL SMEAR: ABNORMAL
POLYCHROMASIA BLD QL SMEAR: ABNORMAL
POTASSIUM SERPL-SCNC: 4.2 MMOL/L (ref 3.5–5.1)
PROT SERPL-MCNC: 7.1 G/DL (ref 6–8.4)
RBC # BLD AUTO: 5.81 M/UL (ref 4–5.4)
SCHISTOCYTES BLD QL SMEAR: ABNORMAL
SCHISTOCYTES BLD QL SMEAR: PRESENT
SODIUM SERPL-SCNC: 140 MMOL/L (ref 136–145)
SPHEROCYTES BLD QL SMEAR: ABNORMAL
TARGETS BLD QL SMEAR: ABNORMAL
WBC # BLD AUTO: 6.89 K/UL (ref 3.9–12.7)

## 2024-05-15 PROCEDURE — 1111F DSCHRG MED/CURRENT MED MERGE: CPT | Mod: CPTII,,, | Performed by: STUDENT IN AN ORGANIZED HEALTH CARE EDUCATION/TRAINING PROGRAM

## 2024-05-15 PROCEDURE — 84100 ASSAY OF PHOSPHORUS: CPT | Performed by: INTERNAL MEDICINE

## 2024-05-15 PROCEDURE — 3074F SYST BP LT 130 MM HG: CPT | Mod: CPTII,,, | Performed by: STUDENT IN AN ORGANIZED HEALTH CARE EDUCATION/TRAINING PROGRAM

## 2024-05-15 PROCEDURE — 1160F RVW MEDS BY RX/DR IN RCRD: CPT | Mod: CPTII,,, | Performed by: STUDENT IN AN ORGANIZED HEALTH CARE EDUCATION/TRAINING PROGRAM

## 2024-05-15 PROCEDURE — 99214 OFFICE O/P EST MOD 30 MIN: CPT | Mod: PBBFAC,25 | Performed by: STUDENT IN AN ORGANIZED HEALTH CARE EDUCATION/TRAINING PROGRAM

## 2024-05-15 PROCEDURE — 83880 ASSAY OF NATRIURETIC PEPTIDE: CPT | Performed by: INTERNAL MEDICINE

## 2024-05-15 PROCEDURE — 4010F ACE/ARB THERAPY RXD/TAKEN: CPT | Mod: CPTII,,, | Performed by: STUDENT IN AN ORGANIZED HEALTH CARE EDUCATION/TRAINING PROGRAM

## 2024-05-15 PROCEDURE — 85025 COMPLETE CBC W/AUTO DIFF WBC: CPT | Performed by: INTERNAL MEDICINE

## 2024-05-15 PROCEDURE — 95251 CONT GLUC MNTR ANALYSIS I&R: CPT | Mod: ,,, | Performed by: STUDENT IN AN ORGANIZED HEALTH CARE EDUCATION/TRAINING PROGRAM

## 2024-05-15 PROCEDURE — 36415 COLL VENOUS BLD VENIPUNCTURE: CPT | Performed by: INTERNAL MEDICINE

## 2024-05-15 PROCEDURE — 99999 PR PBB SHADOW E&M-EST. PATIENT-LVL IV: CPT | Mod: PBBFAC,,, | Performed by: STUDENT IN AN ORGANIZED HEALTH CARE EDUCATION/TRAINING PROGRAM

## 2024-05-15 PROCEDURE — 3066F NEPHROPATHY DOC TX: CPT | Mod: CPTII,,, | Performed by: STUDENT IN AN ORGANIZED HEALTH CARE EDUCATION/TRAINING PROGRAM

## 2024-05-15 PROCEDURE — 3079F DIAST BP 80-89 MM HG: CPT | Mod: CPTII,,, | Performed by: STUDENT IN AN ORGANIZED HEALTH CARE EDUCATION/TRAINING PROGRAM

## 2024-05-15 PROCEDURE — 3044F HG A1C LEVEL LT 7.0%: CPT | Mod: CPTII,,, | Performed by: STUDENT IN AN ORGANIZED HEALTH CARE EDUCATION/TRAINING PROGRAM

## 2024-05-15 PROCEDURE — 3008F BODY MASS INDEX DOCD: CPT | Mod: CPTII,,, | Performed by: STUDENT IN AN ORGANIZED HEALTH CARE EDUCATION/TRAINING PROGRAM

## 2024-05-15 PROCEDURE — 1159F MED LIST DOCD IN RCRD: CPT | Mod: CPTII,,, | Performed by: STUDENT IN AN ORGANIZED HEALTH CARE EDUCATION/TRAINING PROGRAM

## 2024-05-15 PROCEDURE — 83735 ASSAY OF MAGNESIUM: CPT | Performed by: INTERNAL MEDICINE

## 2024-05-15 PROCEDURE — 99214 OFFICE O/P EST MOD 30 MIN: CPT | Mod: 25,S$PBB,, | Performed by: STUDENT IN AN ORGANIZED HEALTH CARE EDUCATION/TRAINING PROGRAM

## 2024-05-15 PROCEDURE — 80053 COMPREHEN METABOLIC PANEL: CPT | Performed by: INTERNAL MEDICINE

## 2024-05-15 PROCEDURE — 36591 DRAW BLOOD OFF VENOUS DEVICE: CPT | Mod: 59

## 2024-05-15 RX ORDER — GLIMEPIRIDE 1 MG/1
1 TABLET ORAL
Qty: 90 TABLET | Refills: 3 | Status: SHIPPED | OUTPATIENT
Start: 2024-05-15 | End: 2025-05-15

## 2024-05-15 NOTE — ASSESSMENT & PLAN NOTE
4 cm adrenal nodule which has been stable since 2018, very low risk of malignancy  This had been followed by Neuroendocrine team in [place]  Surgical removal was considered but then decided not to based on poor cardiac function  Normal PAC/PRA and metanephrines  Normal ACTH, DHEA-s but no formal testing for hypercortisolism yet    She is currently undergoing significant acute health issues so do not think it's the right time to check for hypercortislism

## 2024-05-15 NOTE — PROGRESS NOTES
"Subjective:      Patient ID: Hafsa Hawley is a 58 y.o. female.    Chief Complaint:  Type 2 diabetes mellitus    History of Present Illness  This is a 58 y.o. female. with a past medical history of Type 2 diabetes mellitus, [facility] here for evaluation.    Type 2 diabetes mellitus    Current diabetes medications:  - Jardiance 25 mg daily - Cardiology managing  - Ozempic 1 mg weekly      Past diabetes medications:  - Metformin - CKD  - Levemir  - Glimepiride     Lab Results   Component Value Date    CREATININE 3.4 (H) 05/15/2024    EGFRNORACEVR 15 (A) 05/15/2024       Known diabetic complications: nephropathy and cardiovascular disease    Weight trend:  Wt Readings from Last 8 Encounters:   05/15/24 78.7 kg (173 lb 6.4 oz)   05/14/24 80 kg (176 lb 5.9 oz)   05/07/24 79.8 kg (175 lb 14.8 oz)   05/04/24 80.7 kg (177 lb 12.8 oz)   04/22/24 80 kg (176 lb 7.7 oz)   04/18/24 80.8 kg (178 lb 3.9 oz)   04/08/24 81.6 kg (180 lb)   04/08/24 81.7 kg (180 lb 0.1 oz)       Family history of diabetes:  Yes    Prior visit with diabetes education: yes    Current diet: 3 meals per day    Blood glucose monitoring at home:         Diabetes Management Status  Statin: Taking  ACE/ARB: Not taking    Screening or Prevention Patient's value   HgA1C Testing and Control   Lab Results   Component Value Date    HGBA1C 5.6 05/05/2024        LDL control Lab Results   Component Value Date    LDLCALC 57.8 (L) 01/04/2023      Nephropathy screening No results found for: "MICALBCREAT"     Last eye exam: : 10/24/2023                  R adrenal mass  She has a 4 cm adrenal nodule  This has been followed by Neuroendocrine team in [place]  Adrenal has been stable at 4 cm  Surgical removal was considered but then decided not to based on poor cardiac function       08/07/23 11:19   ALDOSTERONE 15.4   Cortisol, 8 AM 12.70   ACTH 9   Metanephrine, Free 37   Normetanephrine, Free 70   Metanephrine, Total, Plasma 107   DHEA-SO4 94.6       CT A/P wo contrast " "(Sep 2023)  Unchanged 4 cm right adrenal mass. At unremarkable left adrenal gland.     CT A/P wo contrast (Jun 2023)  There is an approximately 4 cm right adrenal lesion again noted appearing stable when compared to prior examinations dating back to June 2018.      CT A/P wo contrast (Feb 2023)  Stomach, spleen, pancreas and left adrenal glands show no significant abnormalities on noncontrast imaging.  There is a 4 cm right adrenal nodule stable compared to multiple prior exams.    Review of Systems  As above    Social and family history reviewed  Current medications and allergies reviewed    Objective:   /82   Pulse 96   Ht 5' 6" (1.676 m)   Wt 78.7 kg (173 lb 6.4 oz)   LMP 10/23/2001   BMI 27.99 kg/m²   Physical Exam  Alert, oriented    BP Readings from Last 1 Encounters:   05/15/24 124/82      Wt Readings from Last 1 Encounters:   05/15/24 1414 78.7 kg (173 lb 6.4 oz)     Body mass index is 27.99 kg/m².    Lab Review:   Lab Results   Component Value Date    HGBA1C 5.6 05/05/2024     Lab Results   Component Value Date    CHOL 136 01/04/2023    HDL 63 01/04/2023    LDLCALC 57.8 (L) 01/04/2023    TRIG 76 01/04/2023    CHOLHDL 46.3 01/04/2023     Lab Results   Component Value Date     05/15/2024    K 4.2 05/15/2024    CL 99 05/15/2024    CO2 26 05/15/2024     (H) 05/15/2024    BUN 89 (H) 05/15/2024    CREATININE 3.4 (H) 05/15/2024    CALCIUM 9.6 05/15/2024    PROT 7.1 05/15/2024    ALBUMIN 3.9 05/15/2024    BILITOT 1.9 (H) 05/15/2024    ALKPHOS 55 05/15/2024    AST 19 05/15/2024    ALT 14 05/15/2024    ANIONGAP 15 05/15/2024    ESTGFRAFRICA 29.7 (A) 04/26/2022    EGFRNONAA 25.7 (A) 04/26/2022    TSH 1.308 08/07/2023       All pertinent labs reviewed    Assessment and Plan     Type 2 diabetes mellitus with stage 4 chronic kidney disease, without long-term current use of insulin  Control has deteriorated recently with significant prandial rises. Her TIR went from 80 to 60% and she is having " significant prandial rises with every meal. She has chronic nausea so would avoid going up on GLP-1 RA.     She is on an SGLT-2 inhibitor which is now prescribed by Cardiology based on CHF. Her GFR is 20 and is also followed by Nephrology. I will defer the use of SGLT-2 inhibitor to them.    I suggested adding low dose SFU to help with prandial spikes.    Plan  - Start Glimepiride 1 mg daily  - Continue Ozempic 1 mg weekly  - Continue Jardiance 25 mg daily  - Continue Dexcom G7 CGM      F/u 6 months            Chronic combined systolic and diastolic heart failure  She is on SGLT-2 inhibitor managed by Cardiology and Nephrology given CHF and CKD  SGLT-2 inhibitor is not useful for glycemic control as her GFR < 30    CKD (chronic kidney disease), stage IV  She is on SGLT-2 inhibitor managed by Cardiology and Nephrology given CHF and CKD  SGLT-2 inhibitor is not useful for glycemic control as her GFR < 30    Adrenal mass 1 cm to 4 cm in diameter  4 cm adrenal nodule which has been stable since 2018, very low risk of malignancy  This had been followed by Neuroendocrine team in [place]  Surgical removal was considered but then decided not to based on poor cardiac function  Normal PAC/PRA and metanephrines  Normal ACTH, DHEA-s but no formal testing for hypercortisolism yet    She is currently undergoing significant acute health issues so do not think it's the right time to check for hypercortislism        Follow-up in 6 months    Uziel Herman MD  Endocrinology

## 2024-05-15 NOTE — ASSESSMENT & PLAN NOTE
She is on SGLT-2 inhibitor managed by Cardiology and Nephrology given CHF and CKD  SGLT-2 inhibitor is not useful for glycemic control as her GFR < 30

## 2024-05-15 NOTE — ASSESSMENT & PLAN NOTE
Control has deteriorated recently with significant prandial rises. Her TIR went from 80 to 60% and she is having significant prandial rises with every meal. She has chronic nausea so would avoid going up on GLP-1 RA.     She is on an SGLT-2 inhibitor which is now prescribed by Cardiology based on CHF. Her GFR is 20 and is also followed by Nephrology. I will defer the use of SGLT-2 inhibitor to them.    I suggested adding low dose SFU to help with prandial spikes.    Plan  - Start Glimepiride 1 mg daily  - Continue Ozempic 1 mg weekly  - Continue Jardiance 25 mg daily  - Continue Dexcom G7 CGM      F/u 6 months

## 2024-05-15 NOTE — PROGRESS NOTES
Right upper arm PICC line dressing change done using sterile technique.  No signs of infection noted.  Bio patch, stat lock device and tegaderm dressing reapplied.  Tolerated well  dc to home in stable condition

## 2024-05-15 NOTE — PATIENT INSTRUCTIONS
Continue Ozempic 1 mg weekly    Continue Jardiance 25 mg daily    Start Glimepiride 1 mg daily - take it with your first meal of the day

## 2024-05-20 ENCOUNTER — INFUSION (OUTPATIENT)
Dept: INFUSION THERAPY | Facility: HOSPITAL | Age: 59
End: 2024-05-20
Attending: INTERNAL MEDICINE
Payer: MEDICAID

## 2024-05-20 VITALS
SYSTOLIC BLOOD PRESSURE: 142 MMHG | HEART RATE: 97 BPM | DIASTOLIC BLOOD PRESSURE: 88 MMHG | TEMPERATURE: 98 F | RESPIRATION RATE: 18 BRPM

## 2024-05-20 DIAGNOSIS — I42.9 CARDIOMYOPATHY, UNSPECIFIED TYPE: Primary | ICD-10-CM

## 2024-05-20 LAB
ALBUMIN SERPL BCP-MCNC: 3.6 G/DL (ref 3.5–5.2)
ALP SERPL-CCNC: 49 U/L (ref 55–135)
ALT SERPL W/O P-5'-P-CCNC: 16 U/L (ref 10–44)
ANION GAP SERPL CALC-SCNC: 14 MMOL/L (ref 8–16)
ANISOCYTOSIS BLD QL SMEAR: ABNORMAL
AST SERPL-CCNC: 20 U/L (ref 10–40)
BASOPHILS # BLD AUTO: 0.03 K/UL (ref 0–0.2)
BASOPHILS NFR BLD: 0.5 % (ref 0–1.9)
BILIRUB SERPL-MCNC: 1.5 MG/DL (ref 0.1–1)
BNP SERPL-MCNC: 1020 PG/ML (ref 0–99)
BUN SERPL-MCNC: 72 MG/DL (ref 6–20)
CALCIUM SERPL-MCNC: 9.2 MG/DL (ref 8.7–10.5)
CHLORIDE SERPL-SCNC: 105 MMOL/L (ref 95–110)
CO2 SERPL-SCNC: 21 MMOL/L (ref 23–29)
CREAT SERPL-MCNC: 3 MG/DL (ref 0.5–1.4)
DACRYOCYTES BLD QL SMEAR: ABNORMAL
DIFFERENTIAL METHOD BLD: ABNORMAL
EOSINOPHIL # BLD AUTO: 0.1 K/UL (ref 0–0.5)
EOSINOPHIL NFR BLD: 1.4 % (ref 0–8)
ERYTHROCYTE [DISTWIDTH] IN BLOOD BY AUTOMATED COUNT: 30.8 % (ref 11.5–14.5)
EST. GFR  (NO RACE VARIABLE): 17 ML/MIN/1.73 M^2
GIANT PLATELETS BLD QL SMEAR: PRESENT
GLUCOSE SERPL-MCNC: 202 MG/DL (ref 70–110)
HCT VFR BLD AUTO: 34.1 % (ref 37–48.5)
HGB BLD-MCNC: 9.9 G/DL (ref 12–16)
HOWELL-JOLLY BOD BLD QL SMEAR: ABNORMAL
HYPOCHROMIA BLD QL SMEAR: ABNORMAL
IMM GRANULOCYTES # BLD AUTO: 0.04 K/UL (ref 0–0.04)
IMM GRANULOCYTES NFR BLD AUTO: 0.7 % (ref 0–0.5)
LYMPHOCYTES # BLD AUTO: 0.6 K/UL (ref 1–4.8)
LYMPHOCYTES NFR BLD: 10.9 % (ref 18–48)
MAGNESIUM SERPL-MCNC: 1.8 MG/DL (ref 1.6–2.6)
MCH RBC QN AUTO: 18.7 PG (ref 27–31)
MCHC RBC AUTO-ENTMCNC: 29 G/DL (ref 32–36)
MCV RBC AUTO: 64 FL (ref 82–98)
MONOCYTES # BLD AUTO: 0.3 K/UL (ref 0.3–1)
MONOCYTES NFR BLD: 5.6 % (ref 4–15)
NEUTROPHILS # BLD AUTO: 4.6 K/UL (ref 1.8–7.7)
NEUTROPHILS NFR BLD: 80.9 % (ref 38–73)
NRBC BLD-RTO: 2 /100 WBC
OVALOCYTES BLD QL SMEAR: ABNORMAL
PHOSPHATE SERPL-MCNC: 3.3 MG/DL (ref 2.7–4.5)
PLATELET # BLD AUTO: 120 K/UL (ref 150–450)
PLATELET BLD QL SMEAR: ABNORMAL
PMV BLD AUTO: ABNORMAL FL (ref 9.2–12.9)
POIKILOCYTOSIS BLD QL SMEAR: ABNORMAL
POLYCHROMASIA BLD QL SMEAR: ABNORMAL
POTASSIUM SERPL-SCNC: 4.4 MMOL/L (ref 3.5–5.1)
PROT SERPL-MCNC: 6.5 G/DL (ref 6–8.4)
RBC # BLD AUTO: 5.3 M/UL (ref 4–5.4)
SCHISTOCYTES BLD QL SMEAR: ABNORMAL
SCHISTOCYTES BLD QL SMEAR: PRESENT
SODIUM SERPL-SCNC: 140 MMOL/L (ref 136–145)
SPHEROCYTES BLD QL SMEAR: ABNORMAL
TARGETS BLD QL SMEAR: ABNORMAL
WBC # BLD AUTO: 5.7 K/UL (ref 3.9–12.7)

## 2024-05-20 PROCEDURE — 36591 DRAW BLOOD OFF VENOUS DEVICE: CPT

## 2024-05-20 PROCEDURE — 83735 ASSAY OF MAGNESIUM: CPT | Performed by: INTERNAL MEDICINE

## 2024-05-20 PROCEDURE — 85025 COMPLETE CBC W/AUTO DIFF WBC: CPT | Performed by: INTERNAL MEDICINE

## 2024-05-20 PROCEDURE — 83880 ASSAY OF NATRIURETIC PEPTIDE: CPT | Performed by: INTERNAL MEDICINE

## 2024-05-20 PROCEDURE — 84100 ASSAY OF PHOSPHORUS: CPT | Performed by: INTERNAL MEDICINE

## 2024-05-20 PROCEDURE — 80053 COMPREHEN METABOLIC PANEL: CPT | Performed by: INTERNAL MEDICINE

## 2024-05-20 NOTE — PROGRESS NOTES
[facility] Provider Note (Initial Clinic) Consult Note    Age: 58 y.o.  Gender: female  Ethnicity: Black or   Type of Congestive Heart Failure: Combined   Etiology: Non-ischemic    Enrolled in Infusion suite: no    Diagnostic Labs:   EKG - 05/05/2024  CXR - 05/05/2024  ECHO - 05/07/2024  Stress test -   Stress echo -   Pharmacologic stress -   Cardiac catheterization - 03/29/2023   Cardiac MRI -     Lab Results   Component Value Date     05/20/2024     05/15/2024    K 4.4 05/20/2024    K 4.2 05/15/2024     05/20/2024    CL 99 05/15/2024    CO2 21 (L) 05/20/2024    CO2 26 05/15/2024     (H) 05/20/2024     (H) 05/15/2024    BUN 72 (H) 05/20/2024    BUN 89 (H) 05/15/2024    CREATININE 3.0 (H) 05/20/2024    CREATININE 3.4 (H) 05/15/2024    CALCIUM 9.2 05/20/2024    CALCIUM 9.6 05/15/2024    PROT 6.5 05/20/2024    PROT 7.1 05/15/2024    ALBUMIN 3.6 05/20/2024    ALBUMIN 3.9 05/15/2024    BILITOT 1.5 (H) 05/20/2024    BILITOT 1.9 (H) 05/15/2024    ALKPHOS 49 (L) 05/20/2024    ALKPHOS 55 05/15/2024    AST 20 05/20/2024    AST 19 05/15/2024    ALT 16 05/20/2024    ALT 14 05/15/2024    ANIONGAP 14 05/20/2024    ANIONGAP 15 05/15/2024    ESTGFRAFRICA 29.7 (A) 04/26/2022    ESTGFRAFRICA 37.3 (A) 04/06/2022    EGFRNONAA 25.7 (A) 04/26/2022    EGFRNONAA 32.3 (A) 04/06/2022       Lab Results   Component Value Date    WBC 5.70 05/20/2024    WBC 6.89 05/15/2024    RBC 5.30 05/20/2024    RBC 5.81 (H) 05/15/2024    HGB 9.9 (L) 05/20/2024    HGB 10.6 (L) 05/15/2024    HCT 34.1 (L) 05/20/2024    HCT 37.3 05/15/2024    MCV 64 (L) 05/20/2024    MCV 64 (L) 05/15/2024    MCH 18.7 (L) 05/20/2024    MCH 18.2 (L) 05/15/2024    MCHC 29.0 (L) 05/20/2024    MCHC 28.4 (L) 05/15/2024    RDW 30.8 (H) 05/20/2024    RDW 30.2 (H) 05/15/2024     (L) 05/20/2024     05/15/2024    MPV SEE COMMENT 05/20/2024    MPV SEE COMMENT 05/15/2024    IMMGR 0.7 (H) 05/20/2024    IMMGR 1.7 (H) 05/15/2024     IGABS 0.04 05/20/2024    IGABS 0.12 (H) 05/15/2024    LYMPH 0.6 (L) 05/20/2024    LYMPH 10.9 (L) 05/20/2024    MONO 0.3 05/20/2024    MONO 5.6 05/20/2024    EOS 0.1 05/20/2024    EOS 0.1 05/15/2024    BASO 0.03 05/20/2024    BASO 0.02 05/15/2024    NRBC 2 (A) 05/20/2024    NRBC 2 (A) 05/15/2024    GRAN 4.6 05/20/2024    GRAN 80.9 (H) 05/20/2024    EOSINOPHIL 1.4 05/20/2024    EOSINOPHIL 0.9 05/15/2024    BASOPHIL 0.5 05/20/2024    BASOPHIL 0.3 05/15/2024    PLTEST Decreased (A) 05/20/2024    PLTEST Decreased (A) 05/15/2024    ANISO Moderate 05/20/2024    ANISO Moderate 05/15/2024    HYPO Occasional 05/20/2024    HYPO Occasional 05/15/2024       Lab Results   Component Value Date    BNP 1,020 (H) 05/20/2024     (H) 05/15/2024    MG 1.8 05/20/2024    MG 2.0 05/15/2024    PHOS 3.3 05/20/2024    PHOS 4.5 05/15/2024    NTPROBNP 8230 (H) 05/10/2023    NTPROBNP 50901 (H) 04/17/2023    TROPONINI 2.235 (H) 05/04/2024    TROPONINI 2.288 (H) 04/08/2024    HGBA1C 5.6 05/05/2024    HGBA1C 4.8 08/07/2023    TSH 1.308 08/07/2023    TSH 1.407 01/04/2023    FREET4 1.07 05/09/2018    G8ILKHA 8.4 09/11/2017       Lab Results   Component Value Date    IRON 72 12/10/2023    TIBC 277 12/10/2023    FERRITIN 118 12/10/2023    CHOL 136 01/04/2023    TRIG 76 01/04/2023    HDL 63 01/04/2023    LDLCALC 57.8 (L) 01/04/2023    CHOLHDL 46.3 01/04/2023    TOTALCHOLEST 2.2 01/04/2023    NONHDLCHOL 73 01/04/2023    COLORU Yellow 05/04/2024    APPEARANCEUA Clear 05/04/2024    PHUR 6.0 05/04/2024    SPECGRAV 1.010 05/04/2024    PROTEINUA 1+ (A) 05/04/2024    GLUCUA 2+ (A) 05/04/2024    KETONESU Negative 05/04/2024    BILIRUBINUA Negative 05/04/2024    OCCULTUA 2+ (A) 05/04/2024    NITRITE Negative 05/04/2024    LEUKOCYTESUR Negative 05/04/2024       List all implanted cardiac devices:   Implanted cardio-defibrillator: yes      Current Outpatient Medications on File Prior to Visit   Medication Sig Dispense Refill    albuterol-ipratropium  (DUO-NEB) 2.5 mg-0.5 mg/3 mL nebulizer solution Take 3 mLs by nebulization every 6 (six) hours as needed for Wheezing or Shortness of Breath. 90 mL 2    allopurinoL (ZYLOPRIM) 300 MG tablet Take 1 tablet (300 mg total) by mouth once daily. 90 tablet 1    ALPRAZolam (XANAX) 2 MG Tab Take 1 tablet orally 2 times a day. 60 tablet 4    amiodarone (PACERONE) 200 MG Tab take one tablet by mouth twice a day 60 tablet 4    apixaban (ELIQUIS) 2.5 mg Tab Take 1 tablet (2.5 mg total) by mouth 2 (two) times daily. 60 tablet 12    aspirin (ECOTRIN) 81 MG EC tablet Take 81 mg by mouth once daily.      atorvastatin (LIPITOR) 40 MG tablet Take 1 tablet (40 mg total) by mouth every evening. 90 tablet 3    benzonatate (TESSALON) 100 MG capsule Take 1 capsule (100 mg total) by mouth 3 (three) times daily as needed for Cough. 60 capsule 2    blood-glucose sensor (DEXCOM G7 SENSOR) Evon change sensor every 10 days. 3 each 11    bumetanide (BUMEX) 2 MG tablet Take 2 tablets (4mg total) by mouth in morning and take 1 tablet (2mg total) by mouth in the evening (no later then 5pm). 90 tablet 5    cetirizine (ZYRTEC) 10 MG tablet Take 1 tablet (10 mg total) by mouth once daily. 30 tablet 12    DOBUTamine (DOBUTREX) 1,000 mg/250 mL (4,000 mcg/mL) infusion Inject 409 mcg/min into the vein continuous.      empagliflozin (JARDIANCE) 25 mg tablet Take 1 tablet orally once a day. 30 tablet 12    ergocalciferol (VITAMIN D2) 50,000 unit Cap Take 1 capsule orally once a  week 4 capsule 11    fluticasone propionate (FLONASE ALLERGY RELIEF) 50 mcg/actuation nasal spray 2 sprays (100 mcg total) by Each Nostril route once daily. 16 g 12    glimepiride (AMARYL) 1 MG tablet Take 1 tablet (1 mg total) by mouth before breakfast. 90 tablet 3    HYDROcodone-acetaminophen (NORCO)  mg per tablet Take 1 tablet by mouth every 6 (six) hours as needed for Pain. 120 tablet 0    isosorbide-hydrALAZINE 20-37.5 mg (BIDIL) 20-37.5 mg Tab Take 1 tablet by mouth 3  (three) times daily. 90 tablet 3    LIDOcaine (LIDODERM) 5 % Place 1 patch onto the skin once daily. Remove & discard patch within 12 hours or as directed by MD. 30 patch 1    magnesium oxide (MAG-OX) 400 mg (241.3 mg magnesium) tablet Take 1 tablet (400 mg total) by mouth every evening. 30 tablet 5    metOLazone (ZAROXOLYN) 5 MG tablet Take 1 tablet orally once a day. 90 tablet 4    ondansetron (ZOFRAN-ODT) 8 MG TbDL Dissolve 1 tablet (8 mg total) by mouth every 8 (eight) hours as needed (nausea). 30 tablet 4    pantoprazole (PROTONIX) 40 MG tablet Take 1 tablet (40 mg total) by mouth once daily. 90 tablet 3    pregabalin (LYRICA) 100 MG capsule Take 1 capsule (100 mg total) by mouth every evening. 30 capsule 2    promethazine-codeine 6.25-10 mg/5 ml (PHENERGAN WITH CODEINE) 6.25-10 mg/5 mL syrup Take 5 mL orally every 6 hours as needed. 118 mL 0    semaglutide (OZEMPIC) 1 mg/dose (4 mg/3 mL) Inject 1 mg into the skin every 7 days. 3 mL 11    SPIRIVA WITH HANDIHALER 18 mcg inhalation capsule Inhale 1 capsule (18 mcg total) into the lungs once daily. 30 capsule 5    VENTOLIN HFA 90 mcg/actuation inhaler Inhale 2 puffs into the lungs every 6 (six) hours as needed for Shortness of Breath or Wheezing. 18 g 11     Current Facility-Administered Medications on File Prior to Visit   Medication Dose Route Frequency Provider Last Rate Last Admin    0.9%  NaCl infusion   Intravenous Continuous Corinna Hayes, NP   New Bag at 05/23/19 5739         HPI:  Patient SOB on minimal exertion with associated dizziness/lightheadedness. Presents to clinic in wheelchair. Endorses worsened fatigue/weakness over the past month significantly limiting ADL. Exhausted presenting to clinic today. Does have supplemental O2 at home that she uses prn.   Patient endorses poor sleep quality due to orthopnea and PND symptoms. Also endorses frequent nausea with episodes of vomiting   Patient wakes up SOB, has to get out of bed, associated  cough- yes   Palpitations - intermittent   Dizzy, light-headed, pre-syncope or syncope- endorses dizziness/lightheadedness with pre-syncopal episodes with exertion. 2 recent falls, landing on knee. Denies associated head trauma or LOC precipitating falls   Since discharge frequency of performing weights, home weight and weight change- reports home goal weight of 172-173lbs   Other information felt pertinent to HPI: Ms. Hafsa Hawley is a 57 yo female with a PMHx of HFrEF (EF 10-15%) 2/2 NICM s/p ICD on palliative dobutamine, PH, DM, HTN, permanent AF, CKD IV who presents to [facility] visit following recent admission for ADHF. Established previously with [facility] clinic with last clinic appointment 1 year ago. Deemed not an advanced options candidate and care was transferred back to local cardiologist at that time. During most recent [place] [facility] [place]. Patient responded well to initial diuresis. Patient displayed intermittent, self-resolved runs of ventricular arrhythmia while inpatient with associated symptomatic palpitations; defibrillator in place and on VT dosed amiodarone. TTE 5/6/24 with stable EF of 15-20%, G2 diastolic dysfunction, severe LV increased mass and eccentric hypertrophy with continued severe global hypokinesis. Patient euvolemic prior to discharge. Patient experienced continued hyperglycemia throughout hospitalization requiring long acting and sliding scale insulin. Discontinuing steroid taper and discharging with instruction to return to outpatient glycemic controllers. Discharged with 1 day of oseltamivir to complete 5 day regimen.     PHYSICAL:   Vitals:    05/23/24 1410   BP: 114/72   Pulse: 98      @[id number](3)@    JVD: yes, difficult to assess   Heart rhythm: regular  Cardiac murmur: Yes     S3: yes  S4: no  Lungs: crackles  Hepatojugular reflux: yes, mid neck  Edema: no BLE edema, protuberant abdomen      Echo 5/6/24:    Left Ventricle: The  left ventricle is severely dilated. Severely increased ventricular mass. Normal wall thickness. There is severe eccentric hypertrophy. Severe global hypokinesis present. There is severely reduced systolic function with a visually estimated ejection fraction of 10 -15%. Grade II diastolic dysfunction. Elevated left ventricular filling pressure. No thrombus present.    Right Ventricle: Normal right ventricular cavity size. Wall thickness is normal. Right ventricle wall motion  is normal. Systolic function is mildly reduced. Pacemaker lead present in the ventricle.    Left Atrium: Left atrium is severely dilated.    Right Atrium: Normal right atrial size.    Aortic Valve: The aortic valve is a trileaflet valve. There is mild annular calcification present. There is mild aortic regurgitation.    Mitral Valve: There is moderate to severe regurgitation with an eccentric jet and a wall hugging jet.    Tricuspid Valve: There is mild regurgitation.    Pulmonic Valve: There is mild regurgitation.    Aorta: Aortic root is normal in size measuring 2.75 cm. Ascending aorta is normal measuring 3.49 cm.    Pulmonary Artery: The estimated pulmonary artery systolic pressure is 44 mmHg.    IVC/SVC: Normal venous pressure at 3 mmHg.       ASSESSMENT: HFrEF    PLAN:      Patient Instructions:   Instruct the patient to notify this clinic if HH, a physician or an advanced care provider wants to change medication one of their HF medications   Activity and Diet restrictions:   Recommend 2-3 gram sodium restriction and 1500cc- 2000cc fluid restriction.  Encourage physical activity with graded exercise program.  Requested patient to weigh themselves daily, and to notify us if their weight increases by more than 3 lbs in 1 day or 5 lbs in 3 days.    Assigned dry weight on home scale: unsure, she reports goal weight 172-173lbs  Medication changes (include current dose and changed dose): NYHA class IV symptoms. Established previously with [facility]  clinic with last clinic appointment 1 year ago. Deemed not an advanced options candidate and care was transferred back to local cardiologist at that time. Presents to [facility] clinic from referral during recent admission for influenza and ADHF. She is tearful during visit regarding her worsening functional status. Previously on zoloft, but no longer taking. Established and followed closely by Palliative care clinic. Will reach out to assist with symptom management of anxiety/depression, insomnia, and nausea/vomiting. Currently patient is taking bumex 4mg in the morning with 5mg metolazone qd. Not taking afternoon bumex doses due to difficulty sleeping 2/2 urinary frequency. Recommend taking afternoon bumex (ideally 4mg, but if unable to tolerate, then at least 2mg) early afternoon. Will route note to long term Cardiologist and plan for weekly phone check ins to complete HFTCC program due to significant difficulty/energy expenditure presenting to C.   Upcoming labs and date anticipated: weekly phone check ins  Other diagnostic tests ordered: recommend continued follow up with palliative care for ongoing GOC discussions and supportive care. Previously not interested in transitioning to hospice. Close follow up appointment scheduled with Palliative Care on 5/29.    Sue Menjivar PA-C

## 2024-05-21 ENCOUNTER — PATIENT OUTREACH (OUTPATIENT)
Dept: ADMINISTRATIVE | Facility: OTHER | Age: 59
End: 2024-05-21
Payer: MEDICAID

## 2024-05-21 ENCOUNTER — OFFICE VISIT (OUTPATIENT)
Dept: RHEUMATOLOGY | Facility: CLINIC | Age: 59
End: 2024-05-21
Payer: MEDICAID

## 2024-05-21 ENCOUNTER — TELEPHONE (OUTPATIENT)
Dept: RHEUMATOLOGY | Facility: CLINIC | Age: 59
End: 2024-05-21

## 2024-05-21 ENCOUNTER — OFFICE VISIT (OUTPATIENT)
Dept: PODIATRY | Facility: CLINIC | Age: 59
End: 2024-05-21
Payer: MEDICAID

## 2024-05-21 VITALS — HEIGHT: 66 IN | BODY MASS INDEX: 27.88 KG/M2 | WEIGHT: 173.5 LBS | RESPIRATION RATE: 18 BRPM

## 2024-05-21 VITALS
WEIGHT: 173.5 LBS | DIASTOLIC BLOOD PRESSURE: 74 MMHG | SYSTOLIC BLOOD PRESSURE: 114 MMHG | HEART RATE: 110 BPM | BODY MASS INDEX: 27.88 KG/M2 | HEIGHT: 66 IN

## 2024-05-21 DIAGNOSIS — M1A.0610 CHRONIC GOUT OF RIGHT KNEE, UNSPECIFIED CAUSE: Primary | ICD-10-CM

## 2024-05-21 DIAGNOSIS — L60.0 INGROWN TOENAIL OF RIGHT FOOT: Primary | ICD-10-CM

## 2024-05-21 DIAGNOSIS — M25.461 EFFUSION OF RIGHT KNEE: ICD-10-CM

## 2024-05-21 DIAGNOSIS — R25.2 LEG CRAMPS: ICD-10-CM

## 2024-05-21 DIAGNOSIS — N18.4 STAGE 4 CHRONIC KIDNEY DISEASE: ICD-10-CM

## 2024-05-21 PROCEDURE — 3066F NEPHROPATHY DOC TX: CPT | Mod: CPTII,,, | Performed by: PODIATRIST

## 2024-05-21 PROCEDURE — 3078F DIAST BP <80 MM HG: CPT | Mod: CPTII,,, | Performed by: STUDENT IN AN ORGANIZED HEALTH CARE EDUCATION/TRAINING PROGRAM

## 2024-05-21 PROCEDURE — 3008F BODY MASS INDEX DOCD: CPT | Mod: CPTII,,, | Performed by: PODIATRIST

## 2024-05-21 PROCEDURE — 4010F ACE/ARB THERAPY RXD/TAKEN: CPT | Mod: CPTII,,, | Performed by: STUDENT IN AN ORGANIZED HEALTH CARE EDUCATION/TRAINING PROGRAM

## 2024-05-21 PROCEDURE — 3008F BODY MASS INDEX DOCD: CPT | Mod: CPTII,,, | Performed by: STUDENT IN AN ORGANIZED HEALTH CARE EDUCATION/TRAINING PROGRAM

## 2024-05-21 PROCEDURE — 1111F DSCHRG MED/CURRENT MED MERGE: CPT | Mod: CPTII,,, | Performed by: PODIATRIST

## 2024-05-21 PROCEDURE — 3066F NEPHROPATHY DOC TX: CPT | Mod: CPTII,,, | Performed by: STUDENT IN AN ORGANIZED HEALTH CARE EDUCATION/TRAINING PROGRAM

## 2024-05-21 PROCEDURE — 3074F SYST BP LT 130 MM HG: CPT | Mod: CPTII,,, | Performed by: STUDENT IN AN ORGANIZED HEALTH CARE EDUCATION/TRAINING PROGRAM

## 2024-05-21 PROCEDURE — 99212 OFFICE O/P EST SF 10 MIN: CPT | Mod: PBBFAC,27,PN | Performed by: PODIATRIST

## 2024-05-21 PROCEDURE — 99999 PR PBB SHADOW E&M-EST. PATIENT-LVL II: CPT | Mod: PBBFAC,,, | Performed by: PODIATRIST

## 2024-05-21 PROCEDURE — 3044F HG A1C LEVEL LT 7.0%: CPT | Mod: CPTII,,, | Performed by: PODIATRIST

## 2024-05-21 PROCEDURE — 1111F DSCHRG MED/CURRENT MED MERGE: CPT | Mod: CPTII,,, | Performed by: STUDENT IN AN ORGANIZED HEALTH CARE EDUCATION/TRAINING PROGRAM

## 2024-05-21 PROCEDURE — 99213 OFFICE O/P EST LOW 20 MIN: CPT | Mod: PBBFAC,PN | Performed by: STUDENT IN AN ORGANIZED HEALTH CARE EDUCATION/TRAINING PROGRAM

## 2024-05-21 PROCEDURE — 99999 PR PBB SHADOW E&M-EST. PATIENT-LVL III: CPT | Mod: PBBFAC,,, | Performed by: STUDENT IN AN ORGANIZED HEALTH CARE EDUCATION/TRAINING PROGRAM

## 2024-05-21 PROCEDURE — 3044F HG A1C LEVEL LT 7.0%: CPT | Mod: CPTII,,, | Performed by: STUDENT IN AN ORGANIZED HEALTH CARE EDUCATION/TRAINING PROGRAM

## 2024-05-21 PROCEDURE — 4010F ACE/ARB THERAPY RXD/TAKEN: CPT | Mod: CPTII,,, | Performed by: PODIATRIST

## 2024-05-21 PROCEDURE — 1159F MED LIST DOCD IN RCRD: CPT | Mod: CPTII,,, | Performed by: STUDENT IN AN ORGANIZED HEALTH CARE EDUCATION/TRAINING PROGRAM

## 2024-05-21 PROCEDURE — 1159F MED LIST DOCD IN RCRD: CPT | Mod: CPTII,,, | Performed by: PODIATRIST

## 2024-05-21 PROCEDURE — 99214 OFFICE O/P EST MOD 30 MIN: CPT | Mod: S$PBB,,, | Performed by: STUDENT IN AN ORGANIZED HEALTH CARE EDUCATION/TRAINING PROGRAM

## 2024-05-21 PROCEDURE — 99213 OFFICE O/P EST LOW 20 MIN: CPT | Mod: S$PBB,,, | Performed by: PODIATRIST

## 2024-05-21 NOTE — PROGRESS NOTES
[facility]  [place] - PODIATRY  [address]  [address]  [place] [address]  Dept: [phone]  Dept Fax: [phone]    Siddhartha Davalos Jr., DPM     Assessment:   CANDELARIO    Coding  1. Ingrown toenail of right foot            Plan:     Procedures    Hafsa was seen today for ingrown toenail.    Diagnoses and all orders for this visit:    Ingrown toenail of right foot        -pt seen, evaluated, and managed  -dx discussed in detail. All questions/concerns addressed  -all tx options discussed. All alternatives, risks, benefits of all txs discussed  -The patient was educated regarding the above diagnosis.   -discussed ingrowing toenails and all tx options. Focused on prevention going fwd  -manually curetted nail px site and covered with trpl abx ointment + bandaid  -pt to perform epsom salt or betadine soaks once daily x 2wks. Written instructions dispensed  -keep toe covered with triple abx ointment + bandaid x 2wks    Rx dispensed: none  Referrals: none  -WB: wbt        Follow up if symptoms worsen or fail to improve.    Subjective:      Patient ID: Hafsa Hawley is a 58 y.o. female.    Chief Complaint:   Chief Complaint   Patient presents with    Ingrown Toenail       CC - ingrown nail: Patient presents to the clinic complaining of painful ingrown toenail on the right foot. Patients rates pain 6/10 on pain scale. patient seeking tx options.    5/21/24:  Hx as above. S/p nail avulsion.    Ingrown Toenail        Last Podiatry Enc: Visit date not found  Last Enc w/ Me: Visit date not found    Outside reports reviewed: historical medical records.  Family hx: as below  Past Medical History:   Diagnosis Date    Allergy     Anemia     Arthritis     Atrial fibrillation     OAC    BMI 32.0-32.9,adult 02/22/2023    Chronic respiratory failure with hypoxia, on home oxygen therapy     2L with activity, off at rest.  Per Pulm  no overt evidence of ILD or COPD on PFTs and CT to explain O2 needs.    CKD  (chronic kidney disease), stage IV 05/08/2018    Congestive heart failure     s/p AICD placement,    Deep vein thrombosis     Depression     elevated bilirubin d/t Gilbert's syndrome     confirmed by [place] genetic testing, evaluated by hepatology    Encounter for blood transfusion     GERD (gastroesophageal reflux disease)     Hypertension     Pheochromocytoma, malignant     Right kidney mass     Sleep apnea     Thalassemia trait, alpha     Thyroid disease     Type 2 diabetes mellitus with hyperglycemia, without long-term current use of insulin 08/13/2020     Past Surgical History:   Procedure Laterality Date    ANGIOGRAM, ABDOMINAL AORTA Right 04/15/2024    APPENDECTOMY      BONE MARROW BIOPSY      CARDIAC DEFIBRILLATOR PLACEMENT Left 12/2016    CARDIAC ELECTROPHYSIOLOGY MAPPING AND ABLATION      CARDIAC ELECTROPHYSIOLOGY MAPPING AND ABLATION      COLONOSCOPY N/A 05/06/2022    Procedure: COLONOSCOPY;  Surgeon: Arely Betancourt MD;  Location: [facility] ENDO ([address] [place] [address]);  Service: Endoscopy;  Laterality: N/A;  heart transplant candidate/ EF 25% - 2nd floor/ defib - Biotronik - ERW  Eliquis - per Dr. Cortez with [facility], Pt ok to hold Eliquis x 2 days prior-see media tab-outside correspondence dated 12/30/21  - ERW  verbal instructions/portal instructions/email instructions - s    EYE SURGERY      due to running tears    FRACTURE SURGERY Left     hand 5th digit    HYSTERECTOMY      KNEE SURGERY Left 2016    hematoma    LIVER BIOPSY  10/24/2018    Minimal steatosis, predominantly macrovesicular, 1%, Minimal nonspecific portal inflammation, no fibrosis. No findings on biopsy to explain elevated bilirubin levels. Could be d/t Gilbert's =?- hemolysis    RIGHT HEART CATHETERIZATION Right 12/07/2021    Procedure: INSERTION, CATHETER, RIGHT HEART;  Surgeon: Irving Cardenas MD;  Location: [facility] CATH LAB;  Service: Cardiology;  Laterality: Right;    RIGHT HEART CATHETERIZATION Right 12/19/2022    Procedure: INSERTION, CATHETER, RIGHT  HEART;  Surgeon: Burke Camilo MD;  Location: [facility] CATH LAB;  Service: Cardiology;  Laterality: Right;    RIGHT HEART CATHETERIZATION Right 2023    Procedure: INSERTION, CATHETER, RIGHT HEART;  Surgeon: Katie Liriano DO;  Location: [facility] CATH LAB;  Service: Cardiology;  Laterality: Right;    TRANSJUGULAR BIOPSY OF LIVER N/A 10/24/2018    Procedure: BIOPSY, LIVER, TRANSJUGULAR APPROACH;  Surgeon: Carmen Diagnostic Provider;  [facility];  Service: Radiology;  Laterality: N/A;     Family History   Problem Relation Name Age of Onset    Cancer Mother          pancreatic CA early 50's    Heart disease Father           MI in late 50's    Hypertension Father      Heart attack Father      Heart disease Sister      Heart disease Brother      Cirrhosis Brother          alcoholic    Heart disease Sister      Heart disease Brother      Hypertension Brother      Diabetes Brother       Current Outpatient Medications   Medication Sig Dispense Refill    albuterol-ipratropium (DUO-NEB) 2.5 mg-0.5 mg/3 mL nebulizer solution Take 3 mLs by nebulization every 6 (six) hours as needed for Wheezing or Shortness of Breath. 90 mL 2    allopurinoL (ZYLOPRIM) 300 MG tablet Take 1 tablet (300 mg total) by mouth once daily. 90 tablet 1    ALPRAZolam (XANAX) 2 MG Tab Take 1 tablet orally 2 times a day. 60 tablet 4    amiodarone (PACERONE) 200 MG Tab take one tablet by mouth twice a day 60 tablet 4    apixaban (ELIQUIS) 2.5 mg Tab Take 1 tablet (2.5 mg total) by mouth 2 (two) times daily. 60 tablet 12    aspirin (ECOTRIN) 81 MG EC tablet Take 81 mg by mouth once daily.      atorvastatin (LIPITOR) 40 MG tablet Take 1 tablet (40 mg total) by mouth every evening. 90 tablet 3    benzonatate (TESSALON) 100 MG capsule Take 1 capsule (100 mg total) by mouth 3 (three) times daily as needed for Cough. 60 capsule 2    blood-glucose sensor (DEXCOM G7 SENSOR) Evon change sensor every 10 days. 3 each 11    bumetanide  (BUMEX) 2 MG tablet Take 2 tablets (4mg total) by mouth in morning and take 1 tablet (2mg total) by mouth in the evening (no later then 5pm). 90 tablet 5    cetirizine (ZYRTEC) 10 MG tablet Take 1 tablet (10 mg total) by mouth once daily. 30 tablet 12    DOBUTamine (DOBUTREX) 1,000 mg/250 mL (4,000 mcg/mL) infusion Inject 409 mcg/min into the vein continuous.      empagliflozin (JARDIANCE) 25 mg tablet Take 1 tablet orally once a day. 30 tablet 12    ergocalciferol (VITAMIN D2) 50,000 unit Cap Take 1 capsule orally once a  week 4 capsule 11    fluticasone propionate (FLONASE ALLERGY RELIEF) 50 mcg/actuation nasal spray 2 sprays (100 mcg total) by Each Nostril route once daily. 16 g 12    glimepiride (AMARYL) 1 MG tablet Take 1 tablet (1 mg total) by mouth before breakfast. 90 tablet 3    HYDROcodone-acetaminophen (NORCO)  mg per tablet Take 1 tablet by mouth every 6 (six) hours as needed for Pain. 120 tablet 0    isosorbide-hydrALAZINE 20-37.5 mg (BIDIL) 20-37.5 mg Tab Take 1 tablet by mouth 3 (three) times daily. 90 tablet 3    LIDOcaine (LIDODERM) 5 % Place 1 patch onto the skin once daily. Remove & discard patch within 12 hours or as directed by MD. 30 patch 1    magnesium oxide (MAG-OX) 400 mg (241.3 mg magnesium) tablet Take 1 tablet (400 mg total) by mouth every evening. 30 tablet 5    metOLazone (ZAROXOLYN) 5 MG tablet Take 1 tablet orally once a day. 90 tablet 4    ondansetron (ZOFRAN-ODT) 8 MG TbDL Dissolve 1 tablet (8 mg total) by mouth every 8 (eight) hours as needed (nausea). 30 tablet 4    pantoprazole (PROTONIX) 40 MG tablet Take 1 tablet (40 mg total) by mouth once daily. 90 tablet 3    pregabalin (LYRICA) 100 MG capsule Take 1 capsule (100 mg total) by mouth every evening. 30 capsule 2    promethazine-codeine 6.25-10 mg/5 ml (PHENERGAN WITH CODEINE) 6.25-10 mg/5 mL syrup Take 5 mL orally every 6 hours as needed. 118 mL 0    semaglutide (OZEMPIC) 1 mg/dose (4 mg/3 mL) Inject 1 mg into the skin  "every 7 days. 3 mL 11    SPIRIVA WITH HANDIHALER 18 mcg inhalation capsule Inhale 1 capsule (18 mcg total) into the lungs once daily. 30 capsule 5    VENTOLIN HFA 90 mcg/actuation inhaler Inhale 2 puffs into the lungs every 6 (six) hours as needed for Shortness of Breath or Wheezing. 18 g 11     No current facility-administered medications for this visit.     Facility-Administered Medications Ordered in Other Visits   Medication Dose Route Frequency Provider Last Rate Last Admin    0.9%  NaCl infusion   Intravenous Continuous Corinna Hayes, NP   New Bag at 05/23/19 0890     Review of patient's allergies indicates:   Allergen Reactions    Penicillins Hives and Other (See Comments)    Iodinated contrast media Nausea And Vomiting    Oxycodone-acetaminophen Other (See Comments)     Nausea, Dizziness, Anxiety.  "I don't like how it makes me feel."   Given Hydromorphone 0.5mg IVP  Without problems.  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 Metabolism of other drugs not listed here may also be impacted by UGT1A1 enzyme activity.       Tramadol Nausea And Vomiting and Other (See Comments)     Other reaction(s): Other (See Comments)    Valsartan Other (See Comments)     Social History     Socioeconomic History    Marital status:    Tobacco Use    Smoking status: Never    Smokeless tobacco: Never   Substance and Sexual Activity    Alcohol use: Not Currently     " Comment: up to 1 yr ago drank 2-3 drinks on occasion but sporadic    Drug use: No    Sexual activity: Yes     Partners: Male   Social History Narrative    On disability since 2013     Social Determinants of Health     Financial Resource Strain: Patient Declined (5/8/2024)    Overall Financial Resource Strain (CARDIA)     Difficulty of Paying Living Expenses: Patient declined   Recent Concern: Financial Resource Strain - Medium Risk (4/23/2024)    Overall Financial Resource Strain (CARDIA)     Difficulty of Paying Living Expenses: Somewhat hard   Food Insecurity: Patient Declined (5/8/2024)    Hunger Vital Sign     Worried About Running Out of Food in the Last Year: Patient declined     Ran Out of Food in the Last Year: Patient declined   Recent Concern: Food Insecurity - Food Insecurity Present (4/23/2024)    Hunger Vital Sign     Worried About Running Out of Food in the Last Year: Sometimes true     Ran Out of Food in the Last Year: Sometimes true   Transportation Needs: Patient Declined (5/8/2024)    TRANSPORTATION NEEDS     Transportation : Patient declined   Physical Activity: Patient Declined (5/8/2024)    Exercise Vital Sign     Days of Exercise per Week: Patient declined     Minutes of Exercise per Session: Patient declined   Recent Concern: Physical Activity - Insufficiently Active (4/23/2024)    Exercise Vital Sign     Days of Exercise per Week: 7 days     Minutes of Exercise per Session: 10 min   Stress: Patient Declined (5/8/2024)    [employer] - Occupational Stress Questionnaire     Feeling of Stress : Patient declined   Recent Concern: Stress - Stress Concern Present (5/6/2024)    [employer] - Occupational Stress Questionnaire     Feeling of Stress : Rather much   Housing Stability: Patient Declined (5/8/2024)    Housing Stability Vital Sign     Unable to Pay for Housing in the Last Year: Patient declined     Homeless in the Last Year: Patient  "declined       ROS    REVIEW OF SYSTEMS: Negative as documented below as well as positive findings in bold.       Constitutional  Respiratory  Gastrointestinal  Skin   - Fever - Cough - Heartburn - Rash   - Chills - Spit blood - Nausea - Itching   - Weight Loss - Shortness of breath - Vomiting - Nail pain   - Malaise/Fatigue - Wheezing - Abdominal Pain  Wound/Ulcer   - Weight Gain   - Blood in Stool  Poor wound healing       - Diarrhea          Cardiovascular  Genitourinary  Neurological  HEENT   - Chest Pain - Dysuria - Burning Sensation of feet - Headache   - Palpitations - Hematuria - Tingling / Paresthesia - Congestion   - Pain at night in legs - Flank Pain - Dizziness - Sore Throat   - Cramping   - Tremor - Blurred Vision   - Leg Swelling   - Sensory Change - Double Vision   - Dizzy when standing   - Speech Change - Eye Redness       - Focal Weakness - Dry Eyes       - Loss of Consciousness          Endocrine  Musculoskeletal  Psychiatric   - Cold intolerance - Muscle Pain - Depression   - Heat intolerance - Neck Pain - Insomnia   - Anemia - Joint Pain - Memory Loss   -  Easy bruising, bleeding - Heel pain - Anxiety      Toe Pain        Leg/Ankle/Foot Pain         Objective:     Resp 18   Ht 5' 6" (1.676 m)   Wt 78.7 kg (173 lb 8 oz)   LMP 10/23/2001   BMI 28.00 kg/m²   Vitals:    05/21/24 1354   Resp: 18   Weight: 78.7 kg (173 lb 8 oz)   Height: 5' 6" (1.676 m)   PainSc: 0-No pain       Physical Exam    General Appearance:   Patient appears well developed, well nourished  Patient appears stated age    Psychiatric:   Patient is oriented to time, place, and person.  Patient has appropriate mood and affect    Neck:  Trachea Midline  No visible masses    Respiratory/Ears:  No distress or labored breathing.  Able to differentiate between normal talking voice and whisper.  Able to follow commands    Eyes:  Visual Acuity intact  Lids and conjunctivae normal. No discoloration noted.    Foot Exam  Physical " Exam  Ortho Exam  Ortho/SPM Exam  Physical Exam  Neurologic Exam    R LE exam con't:  V:  DP 2/4, PT 2/4   CRT< 3s to all digits tested   Tibial and popliteal lymph nodes are w/o abnormality        N:  Patient displays normal ankle reflexes   SILT in SP/DP/T/Ky/Saph distributions    Ortho: +Motor EHL/FHL/TA/GA   +TTP R great toe  Compartments soft/compressible. No pain on passive stretch of big toe. No calf  Pain.    Derm:  skin intact, skin warm and dry, skin without ulcers or lesions, skin without induration, right, great toe nail px site healing well w/o signs of infection    Imaging / Labs:    none        Note: This was dictated using a computer transcription program. Although proofread, it may contain computer transcription errors and phonetic errors. Other human proofreading errors may also exist. Corrections may be performed at a later time. Please contact us for any clarification if needed.    Siddhartha Davalos DPM  [facility]

## 2024-05-21 NOTE — PATIENT INSTRUCTIONS
Instructions for Care after Ingrown Nail removal    General Information: Stay off your feet as much as possible today. You may wear a surgical shoe, sandal or any open toed shoe that does not squeeze, constrict or put pressure on your toe(s). Your toe(s) may remain numb for up to 2-24 hours after the procedure. Although most patients can wear a closed loose fitting shoe after the first week, the toe will heal faster the more you use the open toed shoe in the first 2-3  weeks. Please contact our office if you have any questions or concerns.    Bleeding: Slight bleeding, discoloration and drainage are normal. Due to the chemical used there may be some yellow-clear drainage coming from the toe for 2-3 weeks.    Discomfort: You can elevate your foot to help alleviate minor swelling, bleeding and discomfort. You may also take Advil, Tylenol or other over the counter pain medications to help alleviate pain. Call our office if the pain is not well controlled. Most patients have very little discomfort as long as they minimize their walking for the first 24 hours and do not bump the toe.    Removing the Bandage: Starting the day after the procedure, carefully remove the dressing and shower or bathe as normal. It is Ok to get the bandage soaking wet in the shower and when you remove it, it should not stick to the surgery site.    Dressing Options- Traditional Method:  1. Soaking two times a day in WARM water with Epsom salts or diluted Povidone Iodine  (Betadine) for 15-20 minutes. You will need to purchase these products from the pharmacy.  2. Dry toe then apply an antibiotic cream or ointment such as Neosporin or Polysporin plus or  Garamycin and cover with a 2 x 2 inch size gauze and then secure with a 1 inch band aid.  3. In the second week, take the dressing off at bedtime to air dry the toe.  4. If the toe is infected take the Antibiotic Pills as directed until finished.      Ingrown Toenail        What Is an Ingrown  Toenail?    When a toenail is ingrown, it is curved and grows into the skin, usually at the nail borders (the sides of the nail). This digging in of the nail irritates the skin, often creating pain, redness, swelling and warmth in the toe.    If an ingrown nail causes a break in the skin, bacteria may enter and cause an infection in the area, which is often marked by drainage and a foul odor. However, even if the toe is not painful, red, swollen or warm, a nail that curves downward into the skin can progress to an infection.    Ingrown toenail and normal toenail    Causes  Causes of ingrown toenails include:    Heredity. In many people, the tendency for ingrown toenails is inherited.  Trauma. Sometimes an ingrown toenail is the result of trauma, such as stubbing your toe, having an object fall on your toe or engaging in activities that involve repeated pressure on the toes, such as kicking or running.  Improper trimming. The most common cause of ingrown toenails is cutting your nails too short. This encourages the skin next to the nail to fold over the nail.   Improperly sized footwear. Ingrown toenails can result from wearing socks and shoes that are tight or short.  Nail conditions. Ingrown toenails can be caused by nail problems, such as fungal infections or losing a nail due to trauma.     Treatment  Sometimes initial treatment for ingrown toenails can be safely performed at home. However, home treatment is strongly discouraged if an infection is suspected or for those who have medical conditions that put feet at high risk, such as diabetes, nerve damage in the foot or poor circulation.        Ingrown toenail before and after treatment    Home Care  If you do not have an infection or any of the above medical conditions, you can soak your foot in room-temperature water (adding Epsom salt may be recommended by your doctor) and gently massage the side of the nail fold to help reduce the inflammation.    Avoid  attempting bathroom surgery. Repeated cutting of the nail can cause the condition to worsen over time. If your symptoms fail to improve, it is time to see a foot and ankle surgeon.    Physician Care  After examining the toe, the foot and ankle surgeon will select the treatment best suited for you. If an infection is present, an oral antibiotic may be prescribed.    Sometimes a minor surgical procedure, often performed in the office, will ease the pain and remove the offending nail. After applying a local anesthetic, the doctor removes part of the nails side border. Some nails may become ingrown again, requiring removal of the nail root.    Following the nail procedure, a light bandage will be applied. Most people experience very little pain after surgery and may resume normal activity the next day. If your surgeon has prescribed an oral antibiotic, be sure to take all the medication, even if your symptoms have improved.    Preventing Ingrown Toenails  Many cases of ingrown toenails may be prevented by:    Proper trimming. Cut toenails in a fairly straight line, and do not cut them too short. You should be able to get your fingernail under the sides and end of the nail.  Well-fitting shoes and socks. Do not wear shoes that are short or tight in the toe area. Avoid shoes that are loose because they too cause pressure on the toes, especially when running or walking briskly.               What You Should Know About Home Treatment  Do not cut a notch in the nail. Contrary to what some people believe, this does not reduce the tendency for the nail to curve downward.  Do not repeatedly trim nail borders. Repeated trimming does not change the way the nail grows and can make the condition worse.  Do not place cotton under the nail. Not only does this not relieve the pain, it provides a place for harmful bacteria to grow, resulting in infection.  Over-the-counter medications are ineffective. Topical medications may mask  the pain, but they do not correct the underlying problem.        Understanding Ingrown Toenails    An ingrown nail is the result of a nail growing into the skin that surrounds it. This often occurs at either edge of the big toe. Ingrown nails may be caused by improper trimming, inherited nail deformities, injuries, fungal infections, or pressure.  Symptoms  Ingrown nails may cause pain at the tip of the toe or all the way to the base of the toe. The pain is often worse while walking. An ingrown nail may also lead to infection, inflammation, or a more serious condition. If its infected, you might see pus or redness.  Evaluation  To determine the extent of your problem, your healthcare provider examines and possibly presses the painful area. If other problems are suspected, blood tests, cultures, and X-rays may be done as well.  Treatment  If the nail isnt infected, your healthcare provider may trim the corner of it to help relieve your symptoms. He or she may need to remove one side of your nail back to the cuticle. The base of the nail may then be treated with a chemical to keep the ingrown part from growing back. Severe infections or ingrown nails may require antibiotics and temporary or permanent removal of a portion of the nail. To prevent pain, a local anesthetic may be used in these procedures. This treatment is usually done at your healthcare provider's office.  Prevention  Many nail problems can be prevented by wearing the right shoes and trimming your nails properly. To help avoid infection, keep your feet clean and dry. If you have diabetes, talk with your healthcare provider before doing any foot self-care.  The right shoes: Get your feet measured (your size may change as you age). Wear shoes that are supportive and roomy enough for your toes to wiggle. Look for shoes made of natural materials such as leather, which allow your feet to breathe.  Proper trimming: To avoid problems, trim your toenails  straight across without cutting down into the corners. If you cant trim your own nails, ask your healthcare provider to do so for you.  Date Last Reviewed: 10/1/2016  © 6479-9026 [employer]. [address] [place] [address], [place] [address]. All rights reserved. This information is not intended as a substitute for professional medical care. Always follow your healthcare professional's instructions.

## 2024-05-21 NOTE — PROGRESS NOTES
RHEUMATOLOGY OUTPATIENT CLINIC NOTE    5/21/2024    Attending Rheumatologist: Terese Patel  Primary Care Provider: Cristobal Ann MD   Physician Requesting Consultation: No referring provider defined for this encounter.  Chief Complaint/Reason For Consultation:  Gout and Joint Pain      Subjective:       HPI  Hafsa Hawley is a 58 y.o. Black or  female with extensive medical history such as CKD stage 4, pulmonary hypertension, diabetes mellitus, HFrEF NYHA class 4 on contiuous dobutamin infusions, CAD, hx of DVT on eliquis, GERD.     Interim history  -Last visit in 2/2024. We did CSI of the right knee which she reports only lasted one week.   -since last visit, she was admitted in early May 2024 for influenza and CHF exacerbation. She was on high-dose steroids which were discontinued due to elevated glucose. She follows closely with endocrinology.   -labs from yesterday showed creatinine 3.0 which is stable, normal liver function.  CBC with mild thrombocytopenia, hemoglobin 9.9, WBC 5.7  -she denies any gout flares.  -her concern today is about cramps in bilateral legs and feet, worse at night.  She has not able to drink a lot of water.  She is currently taking magnesium oxide in the morning.    Disease history    She started having different joint pain and swelling since earlier 2023. She noted gout episodes in both feet in April 2023. Then started having right knee swelling since May 2023. Has been seen in the ED twice for right knee swelling. Initial arthrocentesis showed intra and extracellular urate crystals, . Then had repeated right knee arthocentesis in the ED in 10/2023 that did not show any urate crystals.   Her SUA has been as high as 13.6.   Has been on allopurinol 300 mg daily since 4/2023. She was on probenecid since 10/2023 until December 2023-January 2024 when it was d/c'd due to CKD.   Started on prednisone 5 mg daily since 11/2023.     Since started  prednisone daily, she has not have a gout flare. She does report intermittent right knee swelling and pain, feels that knee is going to give out. She has never had a steroid injection in the knee.   Has bilateral diabetic neuropathy and she takes pregabalin 50 mg at night. She is also on hydrocodone/APAP every 6 hours as needed for chronic pain     Component      Latest Ref Rng 5/17/2021 6/23/2021 2/15/2022 5/13/2022 5/31/2022 4/2/2023 4/26/2023   Uric Acid      2.4 - 5.7 mg/dL 10.2 (H)  9.4 (H)  10.0 (H)  12.2 (H)  10.7 (H)  11.7 (H)  13.6 (H)      Component      Latest Ref Rng 10/7/2023 11/9/2023   Uric Acid      2.4 - 5.7 mg/dL 10.0 (H)  4.3           Chronic comorbid conditions affecting medical decision making today:  Past Medical History:   Diagnosis Date    Allergy     Anemia     Arthritis     Atrial fibrillation     OAC    BMI 32.0-32.9,adult 02/22/2023    Chronic respiratory failure with hypoxia, on home oxygen therapy     2L with activity, off at rest.  Per Pulm  no overt evidence of ILD or COPD on PFTs and CT to explain O2 needs.    CKD (chronic kidney disease), stage IV 05/08/2018    Congestive heart failure     s/p AICD placement,    Deep vein thrombosis     Depression     elevated bilirubin d/t Gilbert's syndrome     confirmed by [facility] genetic testing, evaluated by hepatology    Encounter for blood transfusion     GERD (gastroesophageal reflux disease)     Hypertension     Pheochromocytoma, malignant     Right kidney mass     Sleep apnea     Thalassemia trait, alpha     Thyroid disease     Type 2 diabetes mellitus with hyperglycemia, without long-term current use of insulin 08/13/2020     Past Surgical History:   Procedure Laterality Date    ANGIOGRAM, ABDOMINAL AORTA Right 04/15/2024    APPENDECTOMY      BONE MARROW BIOPSY      CARDIAC DEFIBRILLATOR PLACEMENT Left 12/2016    CARDIAC ELECTROPHYSIOLOGY MAPPING AND ABLATION      CARDIAC ELECTROPHYSIOLOGY MAPPING AND ABLATION      COLONOSCOPY N/A  2022    Procedure: COLONOSCOPY;  Surgeon: Arely Betancourt MD;  Location: [facility] ENDO (2ND FLR);  Service: Endoscopy;  Laterality: N/A;  heart transplant candidate/ EF 25% - 2nd floor/ defib - Biotronik - ERW  Eliquis - per Dr. Cortez with [facility], Pt ok to hold Eliquis x 2 days prior-see media tab-outside correspondence dated 21  - ER[facility]  verbal instructions/portal instructions/email instructions - s    EYE SURGERY      due to running tears    FRACTURE SURGERY Left     hand 5th digit    HYSTERECTOMY      KNEE SURGERY Left 2016    hematoma    LIVER BIOPSY  10/24/2018    Minimal steatosis, predominantly macrovesicular, 1%, Minimal nonspecific portal inflammation, no fibrosis. No findings on biopsy to explain elevated bilirubin levels. Could be d/t Gilbert's =?- hemolysis    RIGHT HEART CATHETERIZATION Right 2021    Procedure: INSERTION, CATHETER, RIGHT HEART;  Surgeon: Irving Cardenas MD;  Location: [facility] CATH LAB;  Service: Cardiology;  Laterality: Right;    RIGHT HEART CATHETERIZATION Right 2022    Procedure: INSERTION, CATHETER, RIGHT HEART;  Surgeon: Burke Camilo MD;  Location: [facility] CATH LAB;  Service: Cardiology;  Laterality: Right;    RIGHT HEART CATHETERIZATION Right 2023    Procedure: INSERTION, CATHETER, RIGHT HEART;  Surgeon: Katie Liriano DO;  Location: [facility] CATH LAB;  Service: Cardiology;  Laterality: Right;    TRANSJUGULAR BIOPSY OF LIVER N/A 10/24/2018    Procedure: BIOPSY, LIVER, TRANSJUGULAR APPROACH;  Surgeon: [facility] Provider;  Location: [facility] OR 2ND FLR;  Service: Radiology;  Laterality: N/A;     Family History   Problem Relation Name Age of Onset    Cancer Mother          pancreatic CA early 50's    Heart disease Father           MI in late 50's    Hypertension Father      Heart attack Father      Heart disease Sister      Heart disease Brother      Cirrhosis Brother          alcoholic    Heart disease Sister      Heart disease Brother       Hypertension Brother      Diabetes Brother       Social History     Substance and Sexual Activity   Alcohol Use Not Currently    Comment: up to 1 yr ago drank 2-3 drinks on occasion but sporadic     Social History     Tobacco Use   Smoking Status Never   Smokeless Tobacco Never     Social History     Substance and Sexual Activity   Drug Use No       Current Outpatient Medications:     albuterol-ipratropium (DUO-NEB) 2.5 mg-0.5 mg/3 mL nebulizer solution, Take 3 mLs by nebulization every 6 (six) hours as needed for Wheezing or Shortness of Breath., Disp: 90 mL, Rfl: 2    allopurinoL (ZYLOPRIM) 300 MG tablet, Take 1 tablet (300 mg total) by mouth once daily., Disp: 90 tablet, Rfl: 1    ALPRAZolam (XANAX) 2 MG Tab, Take 1 tablet orally 2 times a day., Disp: 60 tablet, Rfl: 4    amiodarone (PACERONE) 200 MG Tab, take one tablet by mouth twice a day, Disp: 60 tablet, Rfl: 4    apixaban (ELIQUIS) 2.5 mg Tab, Take 1 tablet (2.5 mg total) by mouth 2 (two) times daily., Disp: 60 tablet, Rfl: 12    aspirin (ECOTRIN) 81 MG EC tablet, Take 81 mg by mouth once daily., Disp: , Rfl:     atorvastatin (LIPITOR) 40 MG tablet, Take 1 tablet (40 mg total) by mouth every evening., Disp: 90 tablet, Rfl: 3    benzonatate (TESSALON) 100 MG capsule, Take 1 capsule (100 mg total) by mouth 3 (three) times daily as needed for Cough., Disp: 60 capsule, Rfl: 2    bumetanide (BUMEX) 2 MG tablet, Take 2 tablets (4mg total) by mouth in morning and take 1 tablet (2mg total) by mouth in the evening (no later then 5pm)., Disp: 90 tablet, Rfl: 5    cetirizine (ZYRTEC) 10 MG tablet, Take 1 tablet (10 mg total) by mouth once daily., Disp: 30 tablet, Rfl: 12    DOBUTamine (DOBUTREX) 1,000 mg/250 mL (4,000 mcg/mL) infusion, Inject 409 mcg/min into the vein continuous., Disp: , Rfl:     empagliflozin (JARDIANCE) 25 mg tablet, Take 1 tablet orally once a day., Disp: 30 tablet, Rfl: 12    ergocalciferol (VITAMIN D2) 50,000 unit Cap, Take 1 capsule orally once  a  week, Disp: 4 capsule, Rfl: 11    fluticasone propionate (FLONASE ALLERGY RELIEF) 50 mcg/actuation nasal spray, 2 sprays (100 mcg total) by Each Nostril route once daily., Disp: 16 g, Rfl: 12    glimepiride (AMARYL) 1 MG tablet, Take 1 tablet (1 mg total) by mouth before breakfast., Disp: 90 tablet, Rfl: 3    HYDROcodone-acetaminophen (NORCO)  mg per tablet, Take 1 tablet by mouth every 6 (six) hours as needed for Pain., Disp: 120 tablet, Rfl: 0    isosorbide-hydrALAZINE 20-37.5 mg (BIDIL) 20-37.5 mg Tab, Take 1 tablet by mouth 3 (three) times daily., Disp: 90 tablet, Rfl: 3    LIDOcaine (LIDODERM) 5 %, Place 1 patch onto the skin once daily. Remove & discard patch within 12 hours or as directed by MD., Disp: 30 patch, Rfl: 1    magnesium oxide (MAG-OX) 400 mg (241.3 mg magnesium) tablet, Take 1 tablet (400 mg total) by mouth every evening., Disp: 30 tablet, Rfl: 5    metOLazone (ZAROXOLYN) 5 MG tablet, Take 1 tablet orally once a day., Disp: 90 tablet, Rfl: 4    ondansetron (ZOFRAN-ODT) 8 MG TbDL, Dissolve 1 tablet (8 mg total) by mouth every 8 (eight) hours as needed (nausea)., Disp: 30 tablet, Rfl: 4    pantoprazole (PROTONIX) 40 MG tablet, Take 1 tablet (40 mg total) by mouth once daily., Disp: 90 tablet, Rfl: 3    pregabalin (LYRICA) 100 MG capsule, Take 1 capsule (100 mg total) by mouth every evening., Disp: 30 capsule, Rfl: 2    promethazine-codeine 6.25-10 mg/5 ml (PHENERGAN WITH CODEINE) 6.25-10 mg/5 mL syrup, Take 5 mL orally every 6 hours as needed., Disp: 118 mL, Rfl: 0    semaglutide (OZEMPIC) 1 mg/dose (4 mg/3 mL), Inject 1 mg into the skin every 7 days., Disp: 3 mL, Rfl: 11    SPIRIVA WITH HANDIHALER 18 mcg inhalation capsule, Inhale 1 capsule (18 mcg total) into the lungs once daily., Disp: 30 capsule, Rfl: 5    VENTOLIN HFA 90 mcg/actuation inhaler, Inhale 2 puffs into the lungs every 6 (six) hours as needed for Shortness of Breath or Wheezing., Disp: 18 g, Rfl: 11    blood-glucose sensor  "(DEXCOM G7 SENSOR) Evon, change sensor every 10 days., Disp: 3 each, Rfl: 11  No current facility-administered medications for this visit.    Facility-Administered Medications Ordered in Other Visits:     0.9%  NaCl infusion, , Intravenous, Continuous, Corinna Hayes, NP, New Bag at 05/23/19 0745     Objective:         Vitals:    05/21/24 1014   BP: 114/74   Pulse: 110       Physical Exam  No synovitis on exam   Right knee Crepitus +, no joint effusion.     Reviewed old and all outside pertinent medical records available.    All lab results personally reviewed and interpreted by me.  Lab Results   Component Value Date    WBC 5.70 05/20/2024    HGB 9.9 (L) 05/20/2024    HCT 34.1 (L) 05/20/2024    MCV 64 (L) 05/20/2024    MCH 18.7 (L) 05/20/2024    MCHC 29.0 (L) 05/20/2024    RDW 30.8 (H) 05/20/2024     (L) 05/20/2024    MPV SEE COMMENT 05/20/2024    RBCMORPHOLOG See Comments 02/20/2016    PLTEST Decreased (A) 05/20/2024       Lab Results   Component Value Date     05/20/2024    K 4.4 05/20/2024     05/20/2024    CO2 21 (L) 05/20/2024     (H) 05/20/2024    BUN 72 (H) 05/20/2024    CALCIUM 9.2 05/20/2024    PROT 6.5 05/20/2024    ALBUMIN 3.6 05/20/2024    BILITOT 1.5 (H) 05/20/2024    AST 20 05/20/2024    ALKPHOS 49 (L) 05/20/2024    ALT 16 05/20/2024       Lab Results   Component Value Date    COLORU Yellow 05/04/2024    APPEARANCEUA Clear 05/04/2024    SPECGRAV 1.010 05/04/2024    PHUR 6.0 05/04/2024    PROTEINUA 1+ (A) 05/04/2024    KETONESU Negative 05/04/2024    LEUKOCYTESUR Negative 05/04/2024    NITRITE Negative 05/04/2024    UROBILINOGEN Negative 05/04/2024       Lab Results   Component Value Date    CRP 0.5 01/04/2023       Lab Results   Component Value Date    SEDRATE 3 07/20/2016       No components found for: "25OHVITDTOT", "84WYXHAK7", "60DCAKDR0", "METHODNOTE"    Lab Results   Component Value Date    URICACID 4.3 11/09/2023       Lab Results   Component Value Date    " HEPBSAB 158.83 01/03/2023    HEPBSAB Reactive 01/03/2023    HEPBSAG Non-reactive 01/03/2023    HEPBSAG Negative 12/03/2021    HEPCAB Non-reactive 06/20/2023    HEPCAB Non-reactive 11/15/2022       Imaging:  All imaging reviewed and independently interpreted by me.       ASSESSMENT / PLAN:     Hafsa Hawley is a 58 y.o. Black or  female with:    1. Chronic gout of right knee, unspecified cause  -Crystal proven on arthrocentesis from 5/2023  -Recent SUA 4.3 at goal. Unable to do Febuxostat due to cardiac history. Avoid probenecid due to CKD. If worsening hyperuricemia, consider starting Pegloticase. Will need to decide carefully due to extensive medical history.  -Continue allopurinol 300 mg daily.   -of prednisone due to brittle diabetes    2. Leg cramps  -advised her to change magnesium oxide to magnesium glycinate and to take it at night.   -advised her on use of theraworx spray   -adequate hydration is problematic in her due to heart failure and CKD    3. Effusion of right knee, resolved  -had right knee CSI in 2/2024    3. Stage 4 chronic kidney disease  -Managed by nephrology  -this is a determinant of management of her gout.     Follow up in about 6 months (around 11/21/2024).    Method of contact with patient concerns: [facility]    Disclaimer:  This note is prepared using voice recognition software and as such is likely to have errors and has not been proof read. Please contact me for questions.     Time spent: 20 minutes in face to face discussion concerning diagnosis, prognosis, review of lab and test results, benefits of treatment as well as management of disease, counseling of patient and coordination of care between various health care providers.  Greater than half the time spent was used for coordination of care and counseling of patient.    Terese Patel M.D.  Rheumatology  [facility]

## 2024-05-21 NOTE — TELEPHONE ENCOUNTER
----- Message from Lourdes Lechuga sent at 5/21/2024  9:03 AM CDT -----  Type:  late    Who Called: pt  Symptoms (please be specific): pt feeling dizzy light headed and nausous will be a little late, can you call to see if she can come at a later time    Would the patient rather a call back or a response via [facility]? call  Best Call Back Number:  [phone]  Additional Information:

## 2024-05-22 NOTE — PATIENT INSTRUCTIONS
If you have any questions call Elaine [phone].         [facility] [employer] [facility]  [address] [place] [address]  [phone]     [facility]  [address] [place] [address]     [facility]  [address] [place] [address]     [facility]  [address]   [place] [address]  [phone]     [facility]  [address] [place] [address]  [phone]     [facility] [place]  [address]  [place] [address]  [phone]           [employer]      [facility] [employer]   [phone]     [facility]  [phone]     [facility]  [phone]

## 2024-05-22 NOTE — PROGRESS NOTES
[facility] - Follow Up    This LPN completed a follow up visit with patient via telephone today.  Pt reported: Identity verified.  Patient states she is having dizziness with nausea and has applied a Scopalamine patch.  She states [place] , [place] [address], P 101, weight 174 (1-2 pound weight gain).  Instructed if she gains 3 pounds in a day or 5 pounds in a week to contact cardiologist.  Patient states she continues to have a cough at night.  She denies chest pain, shortness of breath, SI/HI.  Patient states the toes that had ingrown toenails removed are without signs of infection.  Patient states she has not received the educational material mailed to her.  Will mail again.     LPN provided: Mailed food and utility resources again.    Follow up required: Yes  Follow-up Outreach - Due: 6/19/2024

## 2024-05-23 ENCOUNTER — LAB VISIT (OUTPATIENT)
Dept: LAB | Facility: HOSPITAL | Age: 59
End: 2024-05-23
Payer: MEDICAID

## 2024-05-23 ENCOUNTER — OFFICE VISIT (OUTPATIENT)
Dept: CARDIOLOGY | Facility: CLINIC | Age: 59
End: 2024-05-23
Payer: MEDICAID

## 2024-05-23 VITALS
OXYGEN SATURATION: 93 % | HEIGHT: 66 IN | WEIGHT: 175.63 LBS | BODY MASS INDEX: 28.22 KG/M2 | HEART RATE: 98 BPM | SYSTOLIC BLOOD PRESSURE: 114 MMHG | DIASTOLIC BLOOD PRESSURE: 72 MMHG

## 2024-05-23 DIAGNOSIS — I48.0 PAROXYSMAL A-FIB: ICD-10-CM

## 2024-05-23 DIAGNOSIS — N18.4 CKD (CHRONIC KIDNEY DISEASE), STAGE IV: Chronic | ICD-10-CM

## 2024-05-23 DIAGNOSIS — I27.20 PULMONARY HTN: Chronic | ICD-10-CM

## 2024-05-23 DIAGNOSIS — R53.81 DEBILITY: ICD-10-CM

## 2024-05-23 DIAGNOSIS — N18.4 TYPE 2 DIABETES MELLITUS WITH STAGE 4 CHRONIC KIDNEY DISEASE, WITHOUT LONG-TERM CURRENT USE OF INSULIN: ICD-10-CM

## 2024-05-23 DIAGNOSIS — I50.20 ACC/AHA STAGE D SYSTOLIC HEART FAILURE: ICD-10-CM

## 2024-05-23 DIAGNOSIS — Z95.810 ICD (IMPLANTABLE CARDIOVERTER-DEFIBRILLATOR) IN PLACE: Chronic | ICD-10-CM

## 2024-05-23 DIAGNOSIS — R06.02 SOB (SHORTNESS OF BREATH): ICD-10-CM

## 2024-05-23 DIAGNOSIS — E78.2 HYPERLIPIDEMIA, MIXED: ICD-10-CM

## 2024-05-23 DIAGNOSIS — I42.8 NICM (NONISCHEMIC CARDIOMYOPATHY): Chronic | ICD-10-CM

## 2024-05-23 DIAGNOSIS — I47.29 NSVT (NONSUSTAINED VENTRICULAR TACHYCARDIA): ICD-10-CM

## 2024-05-23 DIAGNOSIS — E11.22 TYPE 2 DIABETES MELLITUS WITH STAGE 4 CHRONIC KIDNEY DISEASE, WITHOUT LONG-TERM CURRENT USE OF INSULIN: ICD-10-CM

## 2024-05-23 DIAGNOSIS — I50.42 CHRONIC COMBINED SYSTOLIC AND DIASTOLIC HEART FAILURE: Primary | Chronic | ICD-10-CM

## 2024-05-23 LAB
ALBUMIN SERPL BCP-MCNC: 3.9 G/DL (ref 3.5–5.2)
ALP SERPL-CCNC: 54 U/L (ref 55–135)
ALT SERPL W/O P-5'-P-CCNC: 16 U/L (ref 10–44)
ANION GAP SERPL CALC-SCNC: 16 MMOL/L (ref 8–16)
ANISOCYTOSIS BLD QL SMEAR: ABNORMAL
AST SERPL-CCNC: 24 U/L (ref 10–40)
BASOPHILS # BLD AUTO: 0.03 K/UL (ref 0–0.2)
BASOPHILS NFR BLD: 0.6 % (ref 0–1.9)
BILIRUB SERPL-MCNC: 2.2 MG/DL (ref 0.1–1)
BNP SERPL-MCNC: 1301 PG/ML (ref 0–99)
BUN SERPL-MCNC: 66 MG/DL (ref 6–20)
CALCIUM SERPL-MCNC: 9.9 MG/DL (ref 8.7–10.5)
CHLORIDE SERPL-SCNC: 102 MMOL/L (ref 95–110)
CO2 SERPL-SCNC: 21 MMOL/L (ref 23–29)
CREAT SERPL-MCNC: 3.2 MG/DL (ref 0.5–1.4)
DACRYOCYTES BLD QL SMEAR: ABNORMAL
DIFFERENTIAL METHOD BLD: ABNORMAL
EOSINOPHIL # BLD AUTO: 0.1 K/UL (ref 0–0.5)
EOSINOPHIL NFR BLD: 2.1 % (ref 0–8)
ERYTHROCYTE [DISTWIDTH] IN BLOOD BY AUTOMATED COUNT: 30.6 % (ref 11.5–14.5)
EST. GFR  (NO RACE VARIABLE): 16.2 ML/MIN/1.73 M^2
GIANT PLATELETS BLD QL SMEAR: PRESENT
GLUCOSE SERPL-MCNC: 112 MG/DL (ref 70–110)
HCT VFR BLD AUTO: 37.8 % (ref 37–48.5)
HGB BLD-MCNC: 10.8 G/DL (ref 12–16)
HOWELL-JOLLY BOD BLD QL SMEAR: ABNORMAL
HYPOCHROMIA BLD QL SMEAR: ABNORMAL
IMM GRANULOCYTES # BLD AUTO: 0.04 K/UL (ref 0–0.04)
IMM GRANULOCYTES NFR BLD AUTO: 0.8 % (ref 0–0.5)
LYMPHOCYTES # BLD AUTO: 0.8 K/UL (ref 1–4.8)
LYMPHOCYTES NFR BLD: 16 % (ref 18–48)
MAGNESIUM SERPL-MCNC: 1.9 MG/DL (ref 1.6–2.6)
MCH RBC QN AUTO: 18.8 PG (ref 27–31)
MCHC RBC AUTO-ENTMCNC: 28.6 G/DL (ref 32–36)
MCV RBC AUTO: 66 FL (ref 82–98)
MONOCYTES # BLD AUTO: 0.4 K/UL (ref 0.3–1)
MONOCYTES NFR BLD: 8.1 % (ref 4–15)
NEUTROPHILS # BLD AUTO: 3.8 K/UL (ref 1.8–7.7)
NEUTROPHILS NFR BLD: 72.4 % (ref 38–73)
NRBC BLD-RTO: 3 /100 WBC
OVALOCYTES BLD QL SMEAR: ABNORMAL
PHOSPHATE SERPL-MCNC: 3.6 MG/DL (ref 2.7–4.5)
PLATELET # BLD AUTO: 140 K/UL (ref 150–450)
PLATELET BLD QL SMEAR: ABNORMAL
PMV BLD AUTO: ABNORMAL FL (ref 9.2–12.9)
POIKILOCYTOSIS BLD QL SMEAR: ABNORMAL
POLYCHROMASIA BLD QL SMEAR: ABNORMAL
POTASSIUM SERPL-SCNC: 5.3 MMOL/L (ref 3.5–5.1)
PROT SERPL-MCNC: 7.2 G/DL (ref 6–8.4)
RBC # BLD AUTO: 5.75 M/UL (ref 4–5.4)
SCHISTOCYTES BLD QL SMEAR: ABNORMAL
SCHISTOCYTES BLD QL SMEAR: PRESENT
SODIUM SERPL-SCNC: 139 MMOL/L (ref 136–145)
TARGETS BLD QL SMEAR: ABNORMAL
WBC # BLD AUTO: 5.2 K/UL (ref 3.9–12.7)

## 2024-05-23 PROCEDURE — 99214 OFFICE O/P EST MOD 30 MIN: CPT | Mod: S$PBB,,,

## 2024-05-23 PROCEDURE — 99999 PR PBB SHADOW E&M-EST. PATIENT-LVL V: CPT | Mod: PBBFAC,,,

## 2024-05-23 PROCEDURE — 3008F BODY MASS INDEX DOCD: CPT | Mod: CPTII,,,

## 2024-05-23 PROCEDURE — 1160F RVW MEDS BY RX/DR IN RCRD: CPT | Mod: CPTII,,,

## 2024-05-23 PROCEDURE — 4010F ACE/ARB THERAPY RXD/TAKEN: CPT | Mod: CPTII,,,

## 2024-05-23 PROCEDURE — 1111F DSCHRG MED/CURRENT MED MERGE: CPT | Mod: CPTII,,,

## 2024-05-23 PROCEDURE — 3044F HG A1C LEVEL LT 7.0%: CPT | Mod: CPTII,,,

## 2024-05-23 PROCEDURE — 3078F DIAST BP <80 MM HG: CPT | Mod: CPTII,,,

## 2024-05-23 PROCEDURE — 99215 OFFICE O/P EST HI 40 MIN: CPT | Mod: PBBFAC

## 2024-05-23 PROCEDURE — 84100 ASSAY OF PHOSPHORUS: CPT

## 2024-05-23 PROCEDURE — 1159F MED LIST DOCD IN RCRD: CPT | Mod: CPTII,,,

## 2024-05-23 PROCEDURE — 83880 ASSAY OF NATRIURETIC PEPTIDE: CPT

## 2024-05-23 PROCEDURE — 85025 COMPLETE CBC W/AUTO DIFF WBC: CPT

## 2024-05-23 PROCEDURE — 80053 COMPREHEN METABOLIC PANEL: CPT

## 2024-05-23 PROCEDURE — 83735 ASSAY OF MAGNESIUM: CPT

## 2024-05-23 PROCEDURE — 3074F SYST BP LT 130 MM HG: CPT | Mod: CPTII,,,

## 2024-05-23 PROCEDURE — 3066F NEPHROPATHY DOC TX: CPT | Mod: CPTII,,,

## 2024-05-23 PROCEDURE — 36415 COLL VENOUS BLD VENIPUNCTURE: CPT

## 2024-05-23 RX ORDER — SERTRALINE HYDROCHLORIDE 50 MG/1
50 TABLET, FILM COATED ORAL DAILY
Qty: 30 TABLET | Refills: 11 | Status: SHIPPED | OUTPATIENT
Start: 2024-05-23 | End: 2025-05-23

## 2024-05-23 NOTE — PATIENT INSTRUCTIONS
Recommend taking bumex 4mg in the morning and 2-4mg in the afternoon (~3pm).    Will reach out to Dr. Michel to help with symptom management of nausea, insomnia, and stress.

## 2024-05-24 ENCOUNTER — TELEPHONE (OUTPATIENT)
Dept: CARDIOLOGY | Facility: CLINIC | Age: 59
End: 2024-05-24
Payer: MEDICAID

## 2024-05-24 NOTE — TELEPHONE ENCOUNTER
The pt called in in response to a miss call from our clinic. The pt received the following instructions.     Can we call and let her know I talked with Ms. Hernandez and there are no other infusion suites in the area. I recommend taking 4mg bumex in the early afternoon to help with additional fluid management (if unable to take 4mg, at least 2mg in the afternoon). I'm going to reach out to Dr. Cortez in case he has additional recommendations.     The pt verbalized understanding.

## 2024-05-28 ENCOUNTER — TELEPHONE (OUTPATIENT)
Dept: PALLIATIVE MEDICINE | Facility: CLINIC | Age: 59
End: 2024-05-28
Payer: MEDICAID

## 2024-05-29 ENCOUNTER — INFUSION (OUTPATIENT)
Dept: INFUSION THERAPY | Facility: HOSPITAL | Age: 59
End: 2024-05-29
Attending: INTERNAL MEDICINE
Payer: MEDICAID

## 2024-05-29 ENCOUNTER — TELEPHONE (OUTPATIENT)
Dept: PALLIATIVE MEDICINE | Facility: CLINIC | Age: 59
End: 2024-05-29
Payer: MEDICAID

## 2024-05-29 VITALS — SYSTOLIC BLOOD PRESSURE: 123 MMHG | HEART RATE: 101 BPM | DIASTOLIC BLOOD PRESSURE: 81 MMHG | RESPIRATION RATE: 20 BRPM

## 2024-05-29 DIAGNOSIS — I42.9 CARDIOMYOPATHY, UNSPECIFIED TYPE: Primary | ICD-10-CM

## 2024-05-29 LAB
ALBUMIN SERPL BCP-MCNC: 3.9 G/DL (ref 3.5–5.2)
ALP SERPL-CCNC: 57 U/L (ref 55–135)
ALT SERPL W/O P-5'-P-CCNC: 15 U/L (ref 10–44)
ANION GAP SERPL CALC-SCNC: 13 MMOL/L (ref 8–16)
ANISOCYTOSIS BLD QL SMEAR: ABNORMAL
AST SERPL-CCNC: 20 U/L (ref 10–40)
BASOPHILS # BLD AUTO: 0.01 K/UL (ref 0–0.2)
BASOPHILS NFR BLD: 0.3 % (ref 0–1.9)
BILIRUB SERPL-MCNC: 2.1 MG/DL (ref 0.1–1)
BNP SERPL-MCNC: 1404 PG/ML (ref 0–99)
BUN SERPL-MCNC: 60 MG/DL (ref 6–20)
CALCIUM SERPL-MCNC: 9.8 MG/DL (ref 8.7–10.5)
CHLORIDE SERPL-SCNC: 98 MMOL/L (ref 95–110)
CO2 SERPL-SCNC: 28 MMOL/L (ref 23–29)
CREAT SERPL-MCNC: 3.5 MG/DL (ref 0.5–1.4)
DACRYOCYTES BLD QL SMEAR: ABNORMAL
DIFFERENTIAL METHOD BLD: ABNORMAL
EOSINOPHIL # BLD AUTO: 0.1 K/UL (ref 0–0.5)
EOSINOPHIL NFR BLD: 2 % (ref 0–8)
ERYTHROCYTE [DISTWIDTH] IN BLOOD BY AUTOMATED COUNT: 29.9 % (ref 11.5–14.5)
EST. GFR  (NO RACE VARIABLE): 15 ML/MIN/1.73 M^2
GIANT PLATELETS BLD QL SMEAR: PRESENT
GLUCOSE SERPL-MCNC: 133 MG/DL (ref 70–110)
HCT VFR BLD AUTO: 39 % (ref 37–48.5)
HGB BLD-MCNC: 11.2 G/DL (ref 12–16)
HOWELL-JOLLY BOD BLD QL SMEAR: ABNORMAL
HYPOCHROMIA BLD QL SMEAR: ABNORMAL
IMM GRANULOCYTES # BLD AUTO: 0.02 K/UL (ref 0–0.04)
IMM GRANULOCYTES NFR BLD AUTO: 0.6 % (ref 0–0.5)
LYMPHOCYTES # BLD AUTO: 0.7 K/UL (ref 1–4.8)
LYMPHOCYTES NFR BLD: 20.2 % (ref 18–48)
MAGNESIUM SERPL-MCNC: 2.1 MG/DL (ref 1.6–2.6)
MCH RBC QN AUTO: 18.8 PG (ref 27–31)
MCHC RBC AUTO-ENTMCNC: 28.7 G/DL (ref 32–36)
MCV RBC AUTO: 66 FL (ref 82–98)
MONOCYTES # BLD AUTO: 0.4 K/UL (ref 0.3–1)
MONOCYTES NFR BLD: 10.4 % (ref 4–15)
NEUTROPHILS # BLD AUTO: 2.4 K/UL (ref 1.8–7.7)
NEUTROPHILS NFR BLD: 66.5 % (ref 38–73)
NRBC BLD-RTO: 3 /100 WBC
OVALOCYTES BLD QL SMEAR: ABNORMAL
PHOSPHATE SERPL-MCNC: 3.6 MG/DL (ref 2.7–4.5)
PLATELET # BLD AUTO: 149 K/UL (ref 150–450)
PLATELET BLD QL SMEAR: ABNORMAL
PMV BLD AUTO: ABNORMAL FL (ref 9.2–12.9)
POIKILOCYTOSIS BLD QL SMEAR: ABNORMAL
POLYCHROMASIA BLD QL SMEAR: ABNORMAL
POTASSIUM SERPL-SCNC: 3.9 MMOL/L (ref 3.5–5.1)
PROT SERPL-MCNC: 7 G/DL (ref 6–8.4)
RBC # BLD AUTO: 5.95 M/UL (ref 4–5.4)
SCHISTOCYTES BLD QL SMEAR: ABNORMAL
SCHISTOCYTES BLD QL SMEAR: PRESENT
SODIUM SERPL-SCNC: 139 MMOL/L (ref 136–145)
SPHEROCYTES BLD QL SMEAR: ABNORMAL
TARGETS BLD QL SMEAR: ABNORMAL
WBC # BLD AUTO: 3.56 K/UL (ref 3.9–12.7)

## 2024-05-29 PROCEDURE — 83880 ASSAY OF NATRIURETIC PEPTIDE: CPT | Performed by: INTERNAL MEDICINE

## 2024-05-29 PROCEDURE — 36415 COLL VENOUS BLD VENIPUNCTURE: CPT | Performed by: INTERNAL MEDICINE

## 2024-05-29 PROCEDURE — 84100 ASSAY OF PHOSPHORUS: CPT | Performed by: INTERNAL MEDICINE

## 2024-05-29 PROCEDURE — 83735 ASSAY OF MAGNESIUM: CPT | Performed by: INTERNAL MEDICINE

## 2024-05-29 PROCEDURE — 36591 DRAW BLOOD OFF VENOUS DEVICE: CPT | Mod: 59

## 2024-05-29 PROCEDURE — 80053 COMPREHEN METABOLIC PANEL: CPT | Performed by: INTERNAL MEDICINE

## 2024-05-29 PROCEDURE — 85025 COMPLETE CBC W/AUTO DIFF WBC: CPT | Performed by: INTERNAL MEDICINE

## 2024-05-29 NOTE — PROGRESS NOTES
R arm Picc  line dressing change done using sterile technique.  No signs of redness, swelling or infection noted.  Stat lock device, bio patch and tegaderm dressing reapplied   Tolerated well

## 2024-05-29 NOTE — TELEPHONE ENCOUNTER
Attempted to reach patient after 15 min alberto was not able to leave voicemail.   Subjective   Danna Walker is a 47 y.o. female.   Chief Complaint   Patient presents with   • Depression   • Vitamin D Deficiency       History of Present Illness     #1 Depression-anxiety-on Lexapro 10 mg a day.  Patient takes it everyday.  She reports no side effects.  No suicidal ideations, no aggressive behaviors.  Her mood is up and down, but most of the time good. Anxiety is controlled most of the time.  PHQ 9 at 1, ANDRES 7 at 0.    #2 Vitamin D deficiency -currently on multivitamin only.    The following portions of the patient's history were reviewed and updated as appropriate: allergies, current medications, past family history, past medical history, past social history, past surgical history and problem list.    Review of Systems   Constitutional: Negative.    Respiratory: Negative.    Cardiovascular: Negative.    Psychiatric/Behavioral: Negative for suicidal ideas.         Objective   Wt Readings from Last 3 Encounters:   10/30/18 59.9 kg (132 lb)   07/02/18 60.8 kg (134 lb)   03/13/18 59.4 kg (131 lb)      Vitals:    10/30/18 0855   BP: 100/60   Pulse: 75   Temp: 98 °F (36.7 °C)   SpO2: 98%     Temp Readings from Last 3 Encounters:   10/30/18 98 °F (36.7 °C)   03/13/18 98.2 °F (36.8 °C)   09/12/17 98 °F (36.7 °C)     BP Readings from Last 3 Encounters:   10/30/18 100/60   07/02/18 128/75   03/13/18 100/60     Pulse Readings from Last 3 Encounters:   10/30/18 75   07/02/18 76   03/13/18 101       Physical Exam   Constitutional: She is oriented to person, place, and time. She appears well-developed and well-nourished.   HENT:   Head: Normocephalic and atraumatic.   Neck: Neck supple. Carotid bruit is not present. No thyromegaly present.   Cardiovascular: Normal rate, regular rhythm and normal heart sounds.    Pulmonary/Chest: Effort normal and breath sounds normal.   Neurological: She is alert and oriented to person, place, and time.   Skin: Skin is warm, dry and intact.   Psychiatric: She has a normal mood  and affect. Her behavior is normal.       Assessment/Plan   Danna was seen today for depression and vitamin d deficiency.    Diagnoses and all orders for this visit:    Mixed anxiety depressive disorder    Vitamin D deficiency    Other orders  -     Hepatitis A Vaccine Adult IM  -     escitalopram (LEXAPRO) 10 MG tablet; Take 1 tablet by mouth Daily.        #1 Depression with anxiety-continue current treatment.  Follow-up in 6 months, or sooner if problems.    #2 vitamin D deficiency-check labs before next office visit.

## 2024-05-30 ENCOUNTER — DOCUMENTATION ONLY (OUTPATIENT)
Dept: CARDIOLOGY | Facility: CLINIC | Age: 59
End: 2024-05-30
Payer: MEDICAID

## 2024-05-30 NOTE — PROGRESS NOTES
Patient to call me back with BP and weight. Patient stated she was fatigue from errands and not sure if she gained any weight. Awaiting patient to return call.

## 2024-05-30 NOTE — PROGRESS NOTES
"[facility]) weekly phone follow up / triage call completed.     [facility] RN Navigator spoke with Hafsa Hawley    Current Patient reported weight: 170    Patient Goal Weight: (172-173)  Recent Patient reported blood pressure and heart rate: 120/95 HR 95    Pt reports the following:  []  Shortness of Breath with Activity  []  Shortness of Breath at rest   []  Fatigue  []  Edema   [] Chest pain or tightness  [] Weight Increase since discharge  [x] None of the above    Pt reports using "Daily weight and symptom tracker".    Pt reports being in the green Zone. If in yellow/red, reminded that they should be calling [facility] today or now.     Medications:   Medication compliance reviewed with pt.  Pt reports having medication list available and has all medications at home for use per list.     Education:   Confirmed pt still has "[facility] Home Care Guide"  .     Reminded of key points as listed below.     Recommend 2 -3 gram sodium restriction and 1500 cc-2000 cc fluid restriction.  Encourage physical activity with graded exercise program.  Requested patient to weigh themselves daily, and to notify us if their weight increases by more than 3 lbs in 1 day or 5 lbs in 3 days.   Reminded to use "Daily weight and symptom tracker".  Even if pt does not have a scale, to use symptom tracker.       Watch for these Signs and Symptoms: If any of these occur, contact [facility] immediately:   Increase in shortness of breath with movement   Increase in swelling in your legs and ankles   Weight gain of more than 3 pounds in a day or 5 pounds in 3 days.   Difficulty breathing when you are lying down   Worsening fatigue or tiredness   Stomach bloating, a full feeling or a loss of appetite   Increased coughing--especially when you are lying down      Pt was able to verbalize back to RN in their own words correct diet/fluid restrictions, necessity for exercise, warning signs and " symptoms, when and how to contact their [facility] team.      Pt reminded of upcoming appointment.  PT reports they will attend.       Pt reminded of how and when to contact [facility]:  [phone] (Mon-Fri, 8a-5p) & for urgent issues on the weekend to page the Heart Transplant MD on call.  Pt also encouraged utilize [facility] messaging as well.      Pt  verbalized understanding and in agreement of plan.       Will follow up with pt at next clinic visit and RN navigator available for pt questions, issues or concerns.

## 2024-05-31 DIAGNOSIS — R05.9 COUGH, UNSPECIFIED TYPE: Primary | ICD-10-CM

## 2024-05-31 RX ORDER — PROMETHAZINE HYDROCHLORIDE AND DEXTROMETHORPHAN HYDROBROMIDE 6.25; 15 MG/5ML; MG/5ML
5 SYRUP ORAL EVERY 6 HOURS PRN
Qty: 150 ML | Refills: 3 | Status: SHIPPED | OUTPATIENT
Start: 2024-05-31 | End: 2024-06-10

## 2024-05-31 RX ORDER — PROMETHAZINE HYDROCHLORIDE AND CODEINE PHOSPHATE 6.25; 1 MG/5ML; MG/5ML
SOLUTION ORAL
Qty: 118 ML | Refills: 0 | OUTPATIENT
Start: 2024-05-31

## 2024-05-31 NOTE — TELEPHONE ENCOUNTER
----- Message from Gustavo Allen sent at 2024  3:57 PM CDT -----  Contact: self  Hafsa Hawley  MRN: [id number]  : 1965  PCP: Cristobal Ann  Home Phone      [phone]  Work Phone      Not on file.  Mobile          [phone]      MESSAGE:   Pt requesting refill or new Rx.   Is this a refill or new RX:  refill  RX name and strength: promethazine-codeine 6.25-10 mg/5 ml (PHENERGAN WITH CODEINE) 6.25-10 mg/5 mL syrup  Last office visit: 24  Is this a 30-day or 90-day RX:  30  Pharmacy name and location:  [facility]      Comments:      Phone:  [phone]

## 2024-06-03 ENCOUNTER — TELEPHONE (OUTPATIENT)
Dept: CARDIOLOGY | Facility: CLINIC | Age: 59
End: 2024-06-03
Payer: MEDICAID

## 2024-06-03 ENCOUNTER — LAB VISIT (OUTPATIENT)
Dept: LAB | Facility: HOSPITAL | Age: 59
End: 2024-06-03
Payer: MEDICAID

## 2024-06-03 ENCOUNTER — TELEPHONE (OUTPATIENT)
Dept: FAMILY MEDICINE | Facility: CLINIC | Age: 59
End: 2024-06-03
Payer: MEDICAID

## 2024-06-03 ENCOUNTER — INFUSION (OUTPATIENT)
Dept: INFUSION THERAPY | Facility: HOSPITAL | Age: 59
End: 2024-06-03
Attending: INTERNAL MEDICINE
Payer: MEDICAID

## 2024-06-03 VITALS
TEMPERATURE: 97 F | SYSTOLIC BLOOD PRESSURE: 114 MMHG | HEART RATE: 98 BPM | RESPIRATION RATE: 22 BRPM | DIASTOLIC BLOOD PRESSURE: 78 MMHG

## 2024-06-03 DIAGNOSIS — R52 PAIN: ICD-10-CM

## 2024-06-03 DIAGNOSIS — I42.9 CARDIOMYOPATHY, UNSPECIFIED TYPE: Primary | ICD-10-CM

## 2024-06-03 DIAGNOSIS — R06.02 SOB (SHORTNESS OF BREATH): ICD-10-CM

## 2024-06-03 LAB
ALBUMIN SERPL BCP-MCNC: 3.9 G/DL (ref 3.5–5.2)
ALBUMIN SERPL BCP-MCNC: 3.9 G/DL (ref 3.5–5.2)
ALP SERPL-CCNC: 51 U/L (ref 55–135)
ALP SERPL-CCNC: 55 U/L (ref 55–135)
ALT SERPL W/O P-5'-P-CCNC: 15 U/L (ref 10–44)
ALT SERPL W/O P-5'-P-CCNC: 15 U/L (ref 10–44)
ANION GAP SERPL CALC-SCNC: 13 MMOL/L (ref 8–16)
ANION GAP SERPL CALC-SCNC: 16 MMOL/L (ref 8–16)
AST SERPL-CCNC: 21 U/L (ref 10–40)
AST SERPL-CCNC: 22 U/L (ref 10–40)
BILIRUB SERPL-MCNC: 2.1 MG/DL (ref 0.1–1)
BILIRUB SERPL-MCNC: 2.1 MG/DL (ref 0.1–1)
BNP SERPL-MCNC: 1553 PG/ML (ref 0–99)
BNP SERPL-MCNC: 1648 PG/ML (ref 0–99)
BUN SERPL-MCNC: 67 MG/DL (ref 6–20)
BUN SERPL-MCNC: 68 MG/DL (ref 6–20)
CALCIUM SERPL-MCNC: 9.8 MG/DL (ref 8.7–10.5)
CALCIUM SERPL-MCNC: 9.8 MG/DL (ref 8.7–10.5)
CHLORIDE SERPL-SCNC: 98 MMOL/L (ref 95–110)
CHLORIDE SERPL-SCNC: 99 MMOL/L (ref 95–110)
CO2 SERPL-SCNC: 23 MMOL/L (ref 23–29)
CO2 SERPL-SCNC: 26 MMOL/L (ref 23–29)
CREAT SERPL-MCNC: 3.4 MG/DL (ref 0.5–1.4)
CREAT SERPL-MCNC: 3.4 MG/DL (ref 0.5–1.4)
EST. GFR  (NO RACE VARIABLE): 15 ML/MIN/1.73 M^2
EST. GFR  (NO RACE VARIABLE): 15 ML/MIN/1.73 M^2
GLUCOSE SERPL-MCNC: 175 MG/DL (ref 70–110)
GLUCOSE SERPL-MCNC: 175 MG/DL (ref 70–110)
MAGNESIUM SERPL-MCNC: 1.9 MG/DL (ref 1.6–2.6)
PHOSPHATE SERPL-MCNC: 3.4 MG/DL (ref 2.7–4.5)
POTASSIUM SERPL-SCNC: 4.1 MMOL/L (ref 3.5–5.1)
POTASSIUM SERPL-SCNC: 4.1 MMOL/L (ref 3.5–5.1)
PROT SERPL-MCNC: 6.9 G/DL (ref 6–8.4)
PROT SERPL-MCNC: 7 G/DL (ref 6–8.4)
SODIUM SERPL-SCNC: 137 MMOL/L (ref 136–145)
SODIUM SERPL-SCNC: 138 MMOL/L (ref 136–145)

## 2024-06-03 PROCEDURE — 36415 COLL VENOUS BLD VENIPUNCTURE: CPT

## 2024-06-03 PROCEDURE — 83735 ASSAY OF MAGNESIUM: CPT | Performed by: INTERNAL MEDICINE

## 2024-06-03 PROCEDURE — 36591 DRAW BLOOD OFF VENOUS DEVICE: CPT

## 2024-06-03 PROCEDURE — 80053 COMPREHEN METABOLIC PANEL: CPT

## 2024-06-03 PROCEDURE — 84100 ASSAY OF PHOSPHORUS: CPT | Performed by: INTERNAL MEDICINE

## 2024-06-03 PROCEDURE — 83880 ASSAY OF NATRIURETIC PEPTIDE: CPT

## 2024-06-03 NOTE — PROGRESS NOTES
Right upper arm PICC line dressing change done using sterile technique.  No signs of infection noted.  Stat lock device, bio patch and tegaderm dressing reapplied.  Tolerated well   dc to home in stable condition

## 2024-06-03 NOTE — TELEPHONE ENCOUNTER
PT calls with the following complaints;    PT calls Wt 168 lbs, /93, P-89 no LE swelling but face is swollen, C/O abd swelling/bloating and cramping jose francisco @ area under breast. Has been taking 4 mg Bumex BID, vomiting phlegm and some of her supper since 8AM. Not taking AM meds yet and I told her to hold off till we spoke.   PT also dizzy/lightheaded.    She can hold metolazone today , labs to check electrolytes and contact palliative to help with symptom control of nausea/vomiting.    Jeane can you please place labs for this PT? I am waiting for her to call me she has infusion suite appt with dressing change today @ 12:30. Thanks.    Lab Scheduled 6/3@2:40 DELFIN.    Attempted to call PT and she returns my call, she verbalizes understanding of the above instructions.

## 2024-06-03 NOTE — TELEPHONE ENCOUNTER
----- Message from Frank Cook sent at 6/3/2024  9:02 AM CDT -----  Contact: Patient  Hafsa Hawley  MRN: [id number]  : 1965  PCP: Cristobal Ann  Home Phone      [phone]  Work Phone      Not on file.  Mobile          [phone]      MESSAGE: stomach cramps (hard) - nausea - cough -- states she spoke with [facility] nurse on call - may need IV fluids -- please advise    Call [phone]    PCP: Seth Del Toro)

## 2024-06-03 NOTE — TELEPHONE ENCOUNTER
Spoke with Ms. Hawley to inform her of the following.     Can we call and let her know kidney function and electrolytes stable. Would recommend she take metolazone dose this afternoon. BNP uptrending. Continue bumex 4mg BID.    Pt verbalized understanding. The pt stated her stomach was hurting and the pain will not go away. Pt has nausea and vomits when she eats something. The pt says she reached out to palliative care who did not have a soon enough appt and her PCP suggest she goes to the ER as well. These symptoms were reported to [facility] who suggest if the appt with her PCP or palliative care is not soon enough to go to her nearest ER. The pt verbalized understanding.

## 2024-06-04 ENCOUNTER — TELEPHONE (OUTPATIENT)
Dept: PALLIATIVE MEDICINE | Facility: CLINIC | Age: 59
End: 2024-06-04
Payer: MEDICAID

## 2024-06-04 ENCOUNTER — PATIENT MESSAGE (OUTPATIENT)
Dept: PALLIATIVE MEDICINE | Facility: CLINIC | Age: 59
End: 2024-06-04
Payer: MEDICAID

## 2024-06-04 RX ORDER — HYDROCODONE BITARTRATE AND ACETAMINOPHEN 10; 325 MG/1; MG/1
1 TABLET ORAL EVERY 6 HOURS PRN
Qty: 120 TABLET | Refills: 0 | Status: SHIPPED | OUTPATIENT
Start: 2024-06-04

## 2024-06-04 NOTE — TELEPHONE ENCOUNTER
Unable to reach pt regarding rescheduling  a missed appointment in palliative medicine unable to leave voicemail mail box is full message in the portal was sent as well.

## 2024-06-05 ENCOUNTER — HOSPITAL ENCOUNTER (EMERGENCY)
Facility: HOSPITAL | Age: 59
Discharge: HOME | End: 2024-06-05
Attending: EMERGENCY MEDICINE
Payer: MEDICAID

## 2024-06-05 ENCOUNTER — TELEPHONE (OUTPATIENT)
Dept: CARDIOLOGY | Facility: CLINIC | Age: 59
End: 2024-06-05
Payer: MEDICAID

## 2024-06-05 VITALS
TEMPERATURE: 99 F | SYSTOLIC BLOOD PRESSURE: 104 MMHG | HEIGHT: 66 IN | BODY MASS INDEX: 27.63 KG/M2 | RESPIRATION RATE: 20 BRPM | HEART RATE: 98 BPM | DIASTOLIC BLOOD PRESSURE: 68 MMHG | OXYGEN SATURATION: 97 % | WEIGHT: 171.94 LBS

## 2024-06-05 DIAGNOSIS — R10.9 ABDOMINAL PAIN: ICD-10-CM

## 2024-06-05 DIAGNOSIS — N28.1 RENAL CYST: Primary | ICD-10-CM

## 2024-06-05 LAB
ALBUMIN SERPL BCP-MCNC: 4 G/DL (ref 3.5–5.2)
ALP SERPL-CCNC: 56 U/L (ref 55–135)
ALT SERPL W/O P-5'-P-CCNC: 13 U/L (ref 10–44)
ANION GAP SERPL CALC-SCNC: 16 MMOL/L (ref 8–16)
ANISOCYTOSIS BLD QL SMEAR: ABNORMAL
AST SERPL-CCNC: 21 U/L (ref 10–40)
BACTERIA #/AREA URNS HPF: ABNORMAL /HPF
BASOPHILS # BLD AUTO: 0.03 K/UL (ref 0–0.2)
BASOPHILS NFR BLD: 0.7 % (ref 0–1.9)
BILIRUB SERPL-MCNC: 2.1 MG/DL (ref 0.1–1)
BILIRUB UR QL STRIP: NEGATIVE
BNP SERPL-MCNC: 1125 PG/ML (ref 0–99)
BUN SERPL-MCNC: 79 MG/DL (ref 6–20)
CALCIUM SERPL-MCNC: 9.8 MG/DL (ref 8.7–10.5)
CHLORIDE SERPL-SCNC: 97 MMOL/L (ref 95–110)
CLARITY UR: CLEAR
CO2 SERPL-SCNC: 23 MMOL/L (ref 23–29)
COLOR UR: YELLOW
CREAT SERPL-MCNC: 3.5 MG/DL (ref 0.5–1.4)
DACRYOCYTES BLD QL SMEAR: ABNORMAL
DIFFERENTIAL METHOD BLD: ABNORMAL
EOSINOPHIL # BLD AUTO: 0 K/UL (ref 0–0.5)
EOSINOPHIL NFR BLD: 0.9 % (ref 0–8)
ERYTHROCYTE [DISTWIDTH] IN BLOOD BY AUTOMATED COUNT: 28.2 % (ref 11.5–14.5)
EST. GFR  (NO RACE VARIABLE): 15 ML/MIN/1.73 M^2
GIANT PLATELETS BLD QL SMEAR: PRESENT
GLUCOSE SERPL-MCNC: 117 MG/DL (ref 70–110)
GLUCOSE UR QL STRIP: ABNORMAL
HCT VFR BLD AUTO: 38 % (ref 37–48.5)
HGB BLD-MCNC: 11.6 G/DL (ref 12–16)
HGB UR QL STRIP: ABNORMAL
HOWELL-JOLLY BOD BLD QL SMEAR: ABNORMAL
HYALINE CASTS #/AREA URNS LPF: 5 /LPF
HYPOCHROMIA BLD QL SMEAR: ABNORMAL
IMM GRANULOCYTES # BLD AUTO: 0.02 K/UL (ref 0–0.04)
IMM GRANULOCYTES NFR BLD AUTO: 0.4 % (ref 0–0.5)
KETONES UR QL STRIP: NEGATIVE
LEUKOCYTE ESTERASE UR QL STRIP: ABNORMAL
LIPASE SERPL-CCNC: 119 U/L (ref 4–60)
LYMPHOCYTES # BLD AUTO: 1 K/UL (ref 1–4.8)
LYMPHOCYTES NFR BLD: 22.9 % (ref 18–48)
MCH RBC QN AUTO: 19.2 PG (ref 27–31)
MCHC RBC AUTO-ENTMCNC: 30.5 G/DL (ref 32–36)
MCV RBC AUTO: 63 FL (ref 82–98)
MICROSCOPIC COMMENT: ABNORMAL
MONOCYTES # BLD AUTO: 0.5 K/UL (ref 0.3–1)
MONOCYTES NFR BLD: 11.8 % (ref 4–15)
NEUTROPHILS # BLD AUTO: 2.8 K/UL (ref 1.8–7.7)
NEUTROPHILS NFR BLD: 63.3 % (ref 38–73)
NITRITE UR QL STRIP: NEGATIVE
NON-SQ EPI CELLS #/AREA URNS HPF: 4 /HPF
NRBC BLD-RTO: 2 /100 WBC
OVALOCYTES BLD QL SMEAR: ABNORMAL
PH UR STRIP: 6 [PH] (ref 5–8)
PLATELET # BLD AUTO: 184 K/UL (ref 150–450)
PLATELET BLD QL SMEAR: ABNORMAL
PMV BLD AUTO: ABNORMAL FL (ref 9.2–12.9)
POIKILOCYTOSIS BLD QL SMEAR: ABNORMAL
POLYCHROMASIA BLD QL SMEAR: ABNORMAL
POTASSIUM SERPL-SCNC: 3.8 MMOL/L (ref 3.5–5.1)
PROT SERPL-MCNC: 7.1 G/DL (ref 6–8.4)
PROT UR QL STRIP: ABNORMAL
RBC # BLD AUTO: 6.04 M/UL (ref 4–5.4)
RBC #/AREA URNS HPF: 1 /HPF (ref 0–4)
SCHISTOCYTES BLD QL SMEAR: ABNORMAL
SCHISTOCYTES BLD QL SMEAR: PRESENT
SODIUM SERPL-SCNC: 136 MMOL/L (ref 136–145)
SP GR UR STRIP: <=1.005 (ref 1–1.03)
SPHEROCYTES BLD QL SMEAR: ABNORMAL
SQUAMOUS #/AREA URNS HPF: 5 /HPF
TROPONIN I SERPL DL<=0.01 NG/ML-MCNC: 2.26 NG/ML (ref 0–0.03)
URN SPEC COLLECT METH UR: ABNORMAL
UROBILINOGEN UR STRIP-ACNC: NEGATIVE EU/DL
WBC # BLD AUTO: 4.49 K/UL (ref 3.9–12.7)
WBC #/AREA URNS HPF: 3 /HPF (ref 0–5)

## 2024-06-05 PROCEDURE — 84484 ASSAY OF TROPONIN QUANT: CPT | Performed by: NURSE PRACTITIONER

## 2024-06-05 PROCEDURE — 93010 ELECTROCARDIOGRAM REPORT: CPT | Mod: ,,, | Performed by: INTERNAL MEDICINE

## 2024-06-05 PROCEDURE — 81000 URINALYSIS NONAUTO W/SCOPE: CPT | Performed by: NURSE PRACTITIONER

## 2024-06-05 PROCEDURE — 85025 COMPLETE CBC W/AUTO DIFF WBC: CPT | Performed by: NURSE PRACTITIONER

## 2024-06-05 PROCEDURE — 93005 ELECTROCARDIOGRAM TRACING: CPT

## 2024-06-05 PROCEDURE — 83880 ASSAY OF NATRIURETIC PEPTIDE: CPT | Performed by: NURSE PRACTITIONER

## 2024-06-05 PROCEDURE — 99285 EMERGENCY DEPT VISIT HI MDM: CPT | Mod: 25

## 2024-06-05 PROCEDURE — 83690 ASSAY OF LIPASE: CPT | Performed by: NURSE PRACTITIONER

## 2024-06-05 PROCEDURE — 80053 COMPREHEN METABOLIC PANEL: CPT | Performed by: NURSE PRACTITIONER

## 2024-06-05 PROCEDURE — 99900035 HC TECH TIME PER 15 MIN (STAT)

## 2024-06-05 RX ORDER — DICYCLOMINE HYDROCHLORIDE 20 MG/1
20 TABLET ORAL 2 TIMES DAILY
Qty: 20 TABLET | Refills: 0 | Status: SHIPPED | OUTPATIENT
Start: 2024-06-05 | End: 2024-07-05

## 2024-06-05 NOTE — ED PROVIDER NOTES
"Encounter Date: 6/5/2024       History     Chief Complaint   Patient presents with    Abdominal Pain     Chief complaint: Abdominal pain, vomiting diarrhea   58-year-old female with a history of heart failure on home Dobutrex infusions, AFib CKD DVT depression GERD hypertension type 2 diabetes sleep apnea presents to be evaluated for abdominal pain vomiting and intermittent diarrhea she has had for the last 5 days.  She also reports she feels like she has had a lot of excess phlegm production.  She reports intermittent vomiting she has been able to hold down some food fluids.  She reports a generalized upper abdominal aching pain she rates 8/10.  Denies any medications for symptom relief.  Patient reports she is short of breath but does have significant heart failure and chronic shortness of breath        Review of patient's allergies indicates:   Allergen Reactions    Penicillins Hives and Other (See Comments)    Iodinated contrast media Nausea And Vomiting    Oxycodone-acetaminophen Other (See Comments)     Nausea, Dizziness, Anxiety.  "I don't like how it makes me feel."   Given Hydromorphone 0.5mg IVP  Without problems.  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 Metabolism of other drugs not listed here may also be impacted by UGT1A1 enzyme activity.       Tramadol Nausea And Vomiting and Other (See Comments)     Other reaction(s): Other (See Comments)    Valsartan " Other (See Comments)     Past Medical History:   Diagnosis Date    Allergy     Anemia     Arthritis     Atrial fibrillation     OAC    BMI 32.0-32.9,adult 02/22/2023    Chronic respiratory failure with hypoxia, on home oxygen therapy     2L with activity, off at rest.  Per Pulm  no overt evidence of ILD or COPD on PFTs and CT to explain O2 needs.    CKD (chronic kidney disease), stage IV 05/08/2018    Congestive heart failure     s/p AICD placement,    Deep vein thrombosis     Depression     elevated bilirubin d/t Gilbert's syndrome     confirmed by [place] genetic testing, evaluated by hepatology    Encounter for blood transfusion     GERD (gastroesophageal reflux disease)     Hypertension     Pheochromocytoma, malignant     Right kidney mass     Sleep apnea     Thalassemia trait, alpha     Thyroid disease     Type 2 diabetes mellitus with hyperglycemia, without long-term current use of insulin 08/13/2020     Past Surgical History:   Procedure Laterality Date    ANGIOGRAM, ABDOMINAL AORTA Right 04/15/2024    APPENDECTOMY      BONE MARROW BIOPSY      CARDIAC DEFIBRILLATOR PLACEMENT Left 12/2016    CARDIAC ELECTROPHYSIOLOGY MAPPING AND ABLATION      CARDIAC ELECTROPHYSIOLOGY MAPPING AND ABLATION      COLONOSCOPY N/A 05/06/2022    Procedure: COLONOSCOPY;  Surgeon: Arely Betancourt MD;  Location: [facility] ([address] [place] [address]);  Service: Endoscopy;  Laterality: N/A;  heart transplant candidate/ EF 25% - 2nd floor/ defib - Biotronik - ERW  Eliquis - per Dr. Cortez with [facility], Pt ok to hold Eliquis x 2 days prior-see media tab-outside correspondence dated 12/30/21  - [facility]  verbal instructions/portal instructions/email instructions - s    EYE SURGERY      due to running tears    FRACTURE SURGERY Left     hand 5th digit    HYSTERECTOMY      KNEE SURGERY Left 2016    hematoma    LIVER BIOPSY  10/24/2018    Minimal steatosis, predominantly macrovesicular, 1%, Minimal nonspecific portal inflammation, no fibrosis. No findings on biopsy  to explain elevated bilirubin levels. Could be d/t Gilbert's =?- hemolysis    RIGHT HEART CATHETERIZATION Right 2021    Procedure: INSERTION, CATHETER, RIGHT HEART;  Surgeon: Irving Cardenas MD;  Location: [facility] CATH LAB;  Service: Cardiology;  Laterality: Right;    RIGHT HEART CATHETERIZATION Right 2022    Procedure: INSERTION, CATHETER, RIGHT HEART;  Surgeon: Burke Camilo MD;  Location: [facility] CATH LAB;  Service: Cardiology;  Laterality: Right;    RIGHT HEART CATHETERIZATION Right 2023    Procedure: INSERTION, CATHETER, RIGHT HEART;  Surgeon: Katie Liriano DO;  Location: [facility] CATH LAB;  Service: Cardiology;  Laterality: Right;    TRANSJUGULAR BIOPSY OF LIVER N/A 10/24/2018    Procedure: BIOPSY, LIVER, TRANSJUGULAR APPROACH;  Surgeon: Carmen Diagnostic Provider;  Location: [facility] OR [address] [place] [address];  Service: Radiology;  Laterality: N/A;     Family History   Problem Relation Name Age of Onset    Cancer Mother          pancreatic CA early 50's    Heart disease Father           MI in late 50's    Hypertension Father      Heart attack Father      Heart disease Sister      Heart disease Brother      Cirrhosis Brother          alcoholic    Heart disease Sister      Heart disease Brother      Hypertension Brother      Diabetes Brother       Social History     Tobacco Use    Smoking status: Never    Smokeless tobacco: Never   Substance Use Topics    Alcohol use: Not Currently     Comment: up to 1 yr ago drank 2-3 drinks on occasion but sporadic    Drug use: No     Review of Systems   Constitutional:  Negative for fatigue and fever.   HENT:  Negative for congestion.    Respiratory:  Positive for shortness of breath.    Cardiovascular:  Negative for chest pain and palpitations.   Gastrointestinal:  Positive for abdominal pain, diarrhea, nausea and vomiting.   Neurological:  Positive for weakness.       Physical Exam     Initial Vitals [24 1752]   BP Pulse Resp Temp SpO2   112/76 104 20 98.6 °F  (37 °C) 97 %      MAP       --         Physical Exam    Nursing note and vitals reviewed.  Constitutional: She appears well-developed.   Cardiovascular:  Normal rate, regular rhythm and normal heart sounds.     Exam reveals no friction rub.       No murmur heard.  Pulmonary/Chest: Breath sounds normal. She has no wheezes. She has no rhonchi. She has no rales.   Abdominal: She exhibits no distension and no mass. There is abdominal tenderness.   Epigastric tenderness There is no rebound and no guarding.     Neurological: She is alert and oriented to person, place, and time. She has normal strength.   Skin: Skin is warm and dry.   Psychiatric: She has a normal mood and affect. Thought content normal.         ED Course   Procedures  Labs Reviewed   CBC W/ AUTO DIFFERENTIAL   COMPREHENSIVE METABOLIC PANEL   LIPASE   URINALYSIS, REFLEX TO URINE CULTURE   TROPONIN I   B-TYPE NATRIURETIC PEPTIDE     EKG Readings: (Independently Interpreted)   Rhythm: Normal Sinus Rhythm. Other Impression: Left atrial enlargement with left anterior fascicular block and prolonged QT       Imaging Results    None          Medications - No data to display  Medical Decision Making  Amount and/or Complexity of Data Reviewed  Labs: ordered.  Radiology: ordered.    Risk  Prescription drug management.                                      Clinical Impression:  Final diagnoses:  [R10.9] Abdominal pain       Patient seen and discharged by [facility] level I am cosigning this note  Ilda Godfrey MD  06/11/24 [id number]

## 2024-06-05 NOTE — TELEPHONE ENCOUNTER
PT called @ 2:14 with the following complaints;    PT called with C/O can't eat , sleep, breathe, still vomiting phlegm: wants to know if she should change Bumex to Lasix. I told her she needs to be evaluated in person for those symptoms so she is going to the Hospital.     PT has been advised to go to the ED since Monday, see notes.

## 2024-06-06 LAB
OHS QRS DURATION: 110 MS
OHS QTC CALCULATION: 480 MS

## 2024-06-10 ENCOUNTER — INFUSION (OUTPATIENT)
Dept: INFUSION THERAPY | Facility: HOSPITAL | Age: 59
End: 2024-06-10
Attending: INTERNAL MEDICINE
Payer: MEDICAID

## 2024-06-10 VITALS — DIASTOLIC BLOOD PRESSURE: 89 MMHG | RESPIRATION RATE: 18 BRPM | SYSTOLIC BLOOD PRESSURE: 119 MMHG | HEART RATE: 90 BPM

## 2024-06-10 DIAGNOSIS — I11.0 HYPERTENSIVE HEART DISEASE WITH CONGESTIVE HEART FAILURE, UNSPECIFIED HEART FAILURE TYPE: ICD-10-CM

## 2024-06-10 DIAGNOSIS — I50.42 CHRONIC COMBINED SYSTOLIC AND DIASTOLIC HEART FAILURE: ICD-10-CM

## 2024-06-10 DIAGNOSIS — I42.9 CARDIOMYOPATHY, UNSPECIFIED TYPE: Primary | ICD-10-CM

## 2024-06-10 DIAGNOSIS — E11.9 DIABETES MELLITUS WITHOUT COMPLICATION: ICD-10-CM

## 2024-06-10 DIAGNOSIS — N18.4 CHRONIC KIDNEY DISEASE, STAGE IV (SEVERE): ICD-10-CM

## 2024-06-10 LAB
ALBUMIN SERPL BCP-MCNC: 4 G/DL (ref 3.5–5.2)
ALP SERPL-CCNC: 54 U/L (ref 55–135)
ALT SERPL W/O P-5'-P-CCNC: 18 U/L (ref 10–44)
ANION GAP SERPL CALC-SCNC: 17 MMOL/L (ref 8–16)
ANISOCYTOSIS BLD QL SMEAR: ABNORMAL
AST SERPL-CCNC: 23 U/L (ref 10–40)
BASOPHILS # BLD AUTO: 0.02 K/UL (ref 0–0.2)
BASOPHILS NFR BLD: 0.5 % (ref 0–1.9)
BILIRUB SERPL-MCNC: 2 MG/DL (ref 0.1–1)
BNP SERPL-MCNC: 1210 PG/ML (ref 0–99)
BUN SERPL-MCNC: 92 MG/DL (ref 6–20)
CALCIUM SERPL-MCNC: 9.9 MG/DL (ref 8.7–10.5)
CHLORIDE SERPL-SCNC: 92 MMOL/L (ref 95–110)
CO2 SERPL-SCNC: 27 MMOL/L (ref 23–29)
CREAT SERPL-MCNC: 3.8 MG/DL (ref 0.5–1.4)
DACRYOCYTES BLD QL SMEAR: ABNORMAL
DIFFERENTIAL METHOD BLD: ABNORMAL
EOSINOPHIL # BLD AUTO: 0 K/UL (ref 0–0.5)
EOSINOPHIL NFR BLD: 0.8 % (ref 0–8)
ERYTHROCYTE [DISTWIDTH] IN BLOOD BY AUTOMATED COUNT: 28.3 % (ref 11.5–14.5)
EST. GFR  (NO RACE VARIABLE): 13 ML/MIN/1.73 M^2
GLUCOSE SERPL-MCNC: 139 MG/DL (ref 70–110)
HCT VFR BLD AUTO: 38.9 % (ref 37–48.5)
HGB BLD-MCNC: 11.7 G/DL (ref 12–16)
HYPOCHROMIA BLD QL SMEAR: ABNORMAL
IMM GRANULOCYTES # BLD AUTO: 0.01 K/UL (ref 0–0.04)
IMM GRANULOCYTES NFR BLD AUTO: 0.3 % (ref 0–0.5)
LYMPHOCYTES # BLD AUTO: 0.8 K/UL (ref 1–4.8)
LYMPHOCYTES NFR BLD: 20.6 % (ref 18–48)
MAGNESIUM SERPL-MCNC: 2 MG/DL (ref 1.6–2.6)
MCH RBC QN AUTO: 19 PG (ref 27–31)
MCHC RBC AUTO-ENTMCNC: 30.1 G/DL (ref 32–36)
MCV RBC AUTO: 63 FL (ref 82–98)
MONOCYTES # BLD AUTO: 0.4 K/UL (ref 0.3–1)
MONOCYTES NFR BLD: 11.3 % (ref 4–15)
NEUTROPHILS # BLD AUTO: 2.6 K/UL (ref 1.8–7.7)
NEUTROPHILS NFR BLD: 66.5 % (ref 38–73)
NRBC BLD-RTO: 1 /100 WBC
OVALOCYTES BLD QL SMEAR: ABNORMAL
PHOSPHATE SERPL-MCNC: 4.8 MG/DL (ref 2.7–4.5)
PLATELET # BLD AUTO: 153 K/UL (ref 150–450)
PLATELET BLD QL SMEAR: ABNORMAL
PMV BLD AUTO: ABNORMAL FL (ref 9.2–12.9)
POIKILOCYTOSIS BLD QL SMEAR: ABNORMAL
POLYCHROMASIA BLD QL SMEAR: ABNORMAL
POTASSIUM SERPL-SCNC: 3.8 MMOL/L (ref 3.5–5.1)
PROT SERPL-MCNC: 7.1 G/DL (ref 6–8.4)
RBC # BLD AUTO: 6.15 M/UL (ref 4–5.4)
SCHISTOCYTES BLD QL SMEAR: ABNORMAL
SODIUM SERPL-SCNC: 136 MMOL/L (ref 136–145)
TARGETS BLD QL SMEAR: ABNORMAL
WBC # BLD AUTO: 3.89 K/UL (ref 3.9–12.7)

## 2024-06-10 PROCEDURE — 36591 DRAW BLOOD OFF VENOUS DEVICE: CPT

## 2024-06-10 PROCEDURE — 80053 COMPREHEN METABOLIC PANEL: CPT | Performed by: INTERNAL MEDICINE

## 2024-06-10 PROCEDURE — 36415 COLL VENOUS BLD VENIPUNCTURE: CPT | Performed by: INTERNAL MEDICINE

## 2024-06-10 PROCEDURE — 83735 ASSAY OF MAGNESIUM: CPT | Performed by: INTERNAL MEDICINE

## 2024-06-10 PROCEDURE — 84100 ASSAY OF PHOSPHORUS: CPT | Performed by: INTERNAL MEDICINE

## 2024-06-10 PROCEDURE — 83880 ASSAY OF NATRIURETIC PEPTIDE: CPT | Performed by: INTERNAL MEDICINE

## 2024-06-10 PROCEDURE — 85025 COMPLETE CBC W/AUTO DIFF WBC: CPT | Performed by: INTERNAL MEDICINE

## 2024-06-17 ENCOUNTER — INFUSION (OUTPATIENT)
Dept: INFUSION THERAPY | Facility: HOSPITAL | Age: 59
End: 2024-06-17
Attending: INTERNAL MEDICINE
Payer: MEDICAID

## 2024-06-17 VITALS
TEMPERATURE: 98 F | DIASTOLIC BLOOD PRESSURE: 79 MMHG | RESPIRATION RATE: 20 BRPM | HEART RATE: 103 BPM | SYSTOLIC BLOOD PRESSURE: 134 MMHG

## 2024-06-17 DIAGNOSIS — I42.9 CARDIOMYOPATHY, UNSPECIFIED TYPE: Primary | ICD-10-CM

## 2024-06-17 LAB
ALBUMIN SERPL BCP-MCNC: 4 G/DL (ref 3.5–5.2)
ALP SERPL-CCNC: 53 U/L (ref 55–135)
ALT SERPL W/O P-5'-P-CCNC: 15 U/L (ref 10–44)
ANION GAP SERPL CALC-SCNC: 17 MMOL/L (ref 8–16)
ANISOCYTOSIS BLD QL SMEAR: ABNORMAL
AST SERPL-CCNC: 19 U/L (ref 10–40)
BASOPHILS # BLD AUTO: 0.02 K/UL (ref 0–0.2)
BASOPHILS NFR BLD: 0.4 % (ref 0–1.9)
BILIRUB SERPL-MCNC: 2.2 MG/DL (ref 0.1–1)
BNP SERPL-MCNC: 1567 PG/ML (ref 0–99)
BUN SERPL-MCNC: 88 MG/DL (ref 6–20)
CALCIUM SERPL-MCNC: 10 MG/DL (ref 8.7–10.5)
CHLORIDE SERPL-SCNC: 95 MMOL/L (ref 95–110)
CO2 SERPL-SCNC: 27 MMOL/L (ref 23–29)
CREAT SERPL-MCNC: 4 MG/DL (ref 0.5–1.4)
DIFFERENTIAL METHOD BLD: ABNORMAL
EOSINOPHIL # BLD AUTO: 0.1 K/UL (ref 0–0.5)
EOSINOPHIL NFR BLD: 1.9 % (ref 0–8)
ERYTHROCYTE [DISTWIDTH] IN BLOOD BY AUTOMATED COUNT: 27.9 % (ref 11.5–14.5)
EST. GFR  (NO RACE VARIABLE): 12 ML/MIN/1.73 M^2
GLUCOSE SERPL-MCNC: 157 MG/DL (ref 70–110)
HCT VFR BLD AUTO: 38.5 % (ref 37–48.5)
HGB BLD-MCNC: 11.3 G/DL (ref 12–16)
HYPOCHROMIA BLD QL SMEAR: ABNORMAL
IMM GRANULOCYTES # BLD AUTO: 0.03 K/UL (ref 0–0.04)
IMM GRANULOCYTES NFR BLD AUTO: 0.6 % (ref 0–0.5)
LYMPHOCYTES # BLD AUTO: 0.7 K/UL (ref 1–4.8)
LYMPHOCYTES NFR BLD: 15.7 % (ref 18–48)
MAGNESIUM SERPL-MCNC: 1.8 MG/DL (ref 1.6–2.6)
MCH RBC QN AUTO: 18.9 PG (ref 27–31)
MCHC RBC AUTO-ENTMCNC: 29.4 G/DL (ref 32–36)
MCV RBC AUTO: 64 FL (ref 82–98)
MONOCYTES # BLD AUTO: 0.5 K/UL (ref 0.3–1)
MONOCYTES NFR BLD: 10.3 % (ref 4–15)
NEUTROPHILS # BLD AUTO: 3.3 K/UL (ref 1.8–7.7)
NEUTROPHILS NFR BLD: 71.1 % (ref 38–73)
NRBC BLD-RTO: 1 /100 WBC
OVALOCYTES BLD QL SMEAR: ABNORMAL
PHOSPHATE SERPL-MCNC: 3.9 MG/DL (ref 2.7–4.5)
PLATELET # BLD AUTO: 153 K/UL (ref 150–450)
PLATELET BLD QL SMEAR: ABNORMAL
PMV BLD AUTO: ABNORMAL FL (ref 9.2–12.9)
POLYCHROMASIA BLD QL SMEAR: ABNORMAL
POTASSIUM SERPL-SCNC: 3.9 MMOL/L (ref 3.5–5.1)
PROT SERPL-MCNC: 6.9 G/DL (ref 6–8.4)
RBC # BLD AUTO: 5.98 M/UL (ref 4–5.4)
SCHISTOCYTES BLD QL SMEAR: ABNORMAL
SCHISTOCYTES BLD QL SMEAR: PRESENT
SODIUM SERPL-SCNC: 139 MMOL/L (ref 136–145)
WBC # BLD AUTO: 4.64 K/UL (ref 3.9–12.7)

## 2024-06-17 PROCEDURE — 83735 ASSAY OF MAGNESIUM: CPT | Performed by: STUDENT IN AN ORGANIZED HEALTH CARE EDUCATION/TRAINING PROGRAM

## 2024-06-17 PROCEDURE — 83880 ASSAY OF NATRIURETIC PEPTIDE: CPT | Performed by: STUDENT IN AN ORGANIZED HEALTH CARE EDUCATION/TRAINING PROGRAM

## 2024-06-17 PROCEDURE — 80053 COMPREHEN METABOLIC PANEL: CPT | Performed by: STUDENT IN AN ORGANIZED HEALTH CARE EDUCATION/TRAINING PROGRAM

## 2024-06-17 PROCEDURE — 85025 COMPLETE CBC W/AUTO DIFF WBC: CPT | Performed by: STUDENT IN AN ORGANIZED HEALTH CARE EDUCATION/TRAINING PROGRAM

## 2024-06-17 PROCEDURE — 36591 DRAW BLOOD OFF VENOUS DEVICE: CPT

## 2024-06-17 PROCEDURE — 84100 ASSAY OF PHOSPHORUS: CPT | Performed by: STUDENT IN AN ORGANIZED HEALTH CARE EDUCATION/TRAINING PROGRAM

## 2024-06-17 NOTE — PROGRESS NOTES
Picc line dressing change done using sterile technique  No signs of infection noted.  Bio patch, stat lock device and tegaderm dressing reapplied   Tolerated well   dc to home in stable condition

## 2024-06-20 ENCOUNTER — DOCUMENTATION ONLY (OUTPATIENT)
Dept: CARDIOLOGY | Facility: CLINIC | Age: 59
End: 2024-06-20
Payer: MEDICAID

## 2024-06-20 NOTE — PROGRESS NOTES
"[facility]) DISCHARGE VISIT - PHONE     Called and spoke to Ms. Hafsa Hawley.    Most Recent Hospital Discharge Date: 5/8/2024  Last admission Diagnosis/chief complaint: SOB    Pt discharge completed by phone related to pt convenience.   WT: 168lbs Assigned dry WT: 172-173lbs  BP: 103/77  HR: 104      Pt reports the following:  []  Shortness of Breath with activity  []  Shortness of Breath at rest   [x]  Fatigue  [x]  Edema little in the abdomen  [x] Chest pain or tightness a little through out the day  [] Weight Increase since discharge  [] None of the above    Medications:   Pt reports having all medications available and understands how to take them appropriately. Reminded pt to call prior to making any changes to medications.     Education:   [x] Confirmed pt still has  "[facility] Home Care Guide" .   Reviewed key points as listed below.     Recommend 2 gram sodium restriction and 1500cc fluid restriction.  Encourage physical activity with graded exercise program.  Requested patient to weigh themselves daily, and to notify us if their weight increases by more than 3 lbs in 1 day or 5 lbs in 3 days.     [x] Reviewed completed "Daily Weight and Symptom Tracker".  Reviewed with patient when and how to call [facility] according to "Yellow Zone" and "Red Zone".       Watch for these Signs and Symptoms: If any of these occur, contact [facility] immediately:   Increase in shortness of breath with movement   Increase in swelling in your legs and ankles   Weight gain of more than 3 pounds in a night or 5 pounds in 3 days.   Difficulty breathing when you are lying down   Worsening fatigue or tiredness   Stomach bloating, a full feeling or a loss of appetite   Increased coughing--especially when you are lying down    MyChart and Care Companion:   Patient active on myChart? Yes, but patient does not use regularly    HF TCC Program Plan:  Pt has successfully completed " HFT[facility] program.  Pt care to be transferred to her local cardiologist Dr. Benson Cortez at [facility] for long term care. The pt is not a candidate for advanced options.     Pt educated on how to call their offices and how to call [facility] On call in the event of an after hour issue.    PT reminded to continue to follow recommendations made during the [facility] program to include monitoring daily weights, taking medications according to list, following up to appointments per provider recommendations, stop smoking/ start exercising and following a heart friendly low salt, low fluid diet.      Pt was able to verbalize back to LPN in their own words correct diet/fluid restrictions, necessity for exercise, warning signs and symptoms, when and how to contact their  Long term care team .      Plan:     Continue long term cardiology care with her local cardiologist Dr. Benson Cortez of [facility].    []  Discussed upcoming appointments and/or plan for follow-up care with his/her PCP/Cardiology     Electronic hand off completed : No notes were routed due to no changes being made by Sue Menjivar PA-C.     Please refer to provider note for additional details and assessment.

## 2024-06-21 ENCOUNTER — OFFICE VISIT (OUTPATIENT)
Dept: FAMILY MEDICINE | Facility: CLINIC | Age: 59
End: 2024-06-21
Payer: MEDICAID

## 2024-06-21 VITALS
HEIGHT: 66 IN | OXYGEN SATURATION: 96 % | BODY MASS INDEX: 27.79 KG/M2 | SYSTOLIC BLOOD PRESSURE: 110 MMHG | WEIGHT: 172.94 LBS | RESPIRATION RATE: 16 BRPM | HEART RATE: 103 BPM | DIASTOLIC BLOOD PRESSURE: 80 MMHG

## 2024-06-21 DIAGNOSIS — R10.9 ABDOMINAL PAIN, UNSPECIFIED ABDOMINAL LOCATION: ICD-10-CM

## 2024-06-21 DIAGNOSIS — K58.9 IRRITABLE BOWEL SYNDROME, UNSPECIFIED TYPE: Primary | ICD-10-CM

## 2024-06-21 PROCEDURE — 99999 PR PBB SHADOW E&M-EST. PATIENT-LVL III: CPT | Mod: PBBFAC,,, | Performed by: FAMILY MEDICINE

## 2024-06-21 PROCEDURE — 99213 OFFICE O/P EST LOW 20 MIN: CPT | Mod: PBBFAC | Performed by: FAMILY MEDICINE

## 2024-06-21 RX ORDER — PROMETHAZINE HYDROCHLORIDE AND CODEINE PHOSPHATE 6.25; 1 MG/5ML; MG/5ML
5 SOLUTION ORAL EVERY 8 HOURS PRN
Qty: 150 ML | Refills: 1 | Status: SHIPPED | OUTPATIENT
Start: 2024-06-21

## 2024-06-21 NOTE — PROGRESS NOTES
Subjective:       Patient ID: Hafsa Hawley is a 58 y.o. female.    Chief Complaint: Fatigue and Abdominal Pain (Pt c/o ongoing abd pain and weakness )    57 y/o B F with abd pains 6/10 for the last month coming & going    Fatigue  Associated symptoms include abdominal pain and fatigue.   Abdominal Pain      Review of Systems   Constitutional:  Positive for fatigue.   Gastrointestinal:  Positive for abdominal pain. Negative for abdominal distention.        Pain is on & off for > 1 month       Objective:      Physical Exam  Constitutional:       Appearance: She is well-developed.   HENT:      Head: Normocephalic.   Eyes:      Pupils: Pupils are equal, round, and reactive to light.   Neck:      Thyroid: No thyromegaly.   Cardiovascular:      Rate and Rhythm: Normal rate and regular rhythm.   Pulmonary:      Effort: No respiratory distress.      Breath sounds: No wheezing or rales.   Chest:      Chest wall: No tenderness.   Abdominal:      General: There is no distension.      Tenderness: There is abdominal tenderness. There is no guarding or rebound.      Comments: BS++w generalized tenderness     Musculoskeletal:         General: No tenderness. Normal range of motion.      Cervical back: Normal range of motion and neck supple.   Lymphadenopathy:      Cervical: No cervical adenopathy.   Skin:     General: Skin is warm and dry.      Coloration: Skin is not pale.      Findings: No rash.   Neurological:      Mental Status: She is alert and oriented to person, place, and time.      Cranial Nerves: No cranial nerve deficit.      Motor: No abnormal muscle tone.      Coordination: Coordination normal.      Deep Tendon Reflexes: Reflexes are normal and symmetric. Reflexes normal.   Psychiatric:         Thought Content: Thought content normal.         Judgment: Judgment normal.         Assessment:       Encounter Diagnoses   Name Primary?    Irritable bowel syndrome, unspecified type Yes    Abdominal pain, unspecified  abdominal location          Plan:   1. Irritable bowel syndrome, unspecified type    2. Abdominal pain, unspecified abdominal location    Other orders  -     promethazine-codeine 6.25-10 mg/5 ml (PHENERGAN WITH CODEINE) 6.25-10 mg/5 mL syrup; Take 5 mLs by mouth every 8 (eight) hours as needed for Cough.  Dispense: 150 mL; Refill: 1     Trial of Xifaxin tid for one week

## 2024-06-26 ENCOUNTER — PATIENT OUTREACH (OUTPATIENT)
Dept: ADMINISTRATIVE | Facility: OTHER | Age: 59
End: 2024-06-26
Payer: MEDICAID

## 2024-06-27 ENCOUNTER — HOSPITAL ENCOUNTER (EMERGENCY)
Facility: HOSPITAL | Age: 59
Discharge: HOME | End: 2024-06-27
Attending: STUDENT IN AN ORGANIZED HEALTH CARE EDUCATION/TRAINING PROGRAM
Payer: MEDICAID

## 2024-06-27 VITALS
BODY MASS INDEX: 28.49 KG/M2 | WEIGHT: 177.25 LBS | OXYGEN SATURATION: 94 % | RESPIRATION RATE: 18 BRPM | TEMPERATURE: 98 F | HEART RATE: 103 BPM | SYSTOLIC BLOOD PRESSURE: 107 MMHG | HEIGHT: 66 IN | DIASTOLIC BLOOD PRESSURE: 73 MMHG

## 2024-06-27 DIAGNOSIS — R06.02 SHORTNESS OF BREATH: Primary | ICD-10-CM

## 2024-06-27 LAB
ALBUMIN SERPL BCP-MCNC: 3.6 G/DL (ref 3.5–5.2)
ALP SERPL-CCNC: 55 U/L (ref 55–135)
ALT SERPL W/O P-5'-P-CCNC: 23 U/L (ref 10–44)
ANION GAP SERPL CALC-SCNC: 14 MMOL/L (ref 8–16)
ANISOCYTOSIS BLD QL SMEAR: ABNORMAL
AST SERPL-CCNC: 36 U/L (ref 10–40)
BASOPHILS # BLD AUTO: 0.04 K/UL (ref 0–0.2)
BASOPHILS NFR BLD: 0.8 % (ref 0–1.9)
BILIRUB SERPL-MCNC: 1.7 MG/DL (ref 0.1–1)
BILIRUB UR QL STRIP: NEGATIVE
BNP SERPL-MCNC: 1998 PG/ML (ref 0–99)
BUN SERPL-MCNC: 81 MG/DL (ref 6–20)
CALCIUM SERPL-MCNC: 9.3 MG/DL (ref 8.7–10.5)
CHLORIDE SERPL-SCNC: 100 MMOL/L (ref 95–110)
CLARITY UR: CLEAR
CO2 SERPL-SCNC: 23 MMOL/L (ref 23–29)
COLOR UR: YELLOW
CREAT SERPL-MCNC: 3.6 MG/DL (ref 0.5–1.4)
DACRYOCYTES BLD QL SMEAR: ABNORMAL
DIFFERENTIAL METHOD BLD: ABNORMAL
EOSINOPHIL # BLD AUTO: 0.1 K/UL (ref 0–0.5)
EOSINOPHIL NFR BLD: 2.3 % (ref 0–8)
ERYTHROCYTE [DISTWIDTH] IN BLOOD BY AUTOMATED COUNT: 29.1 % (ref 11.5–14.5)
EST. GFR  (NO RACE VARIABLE): 14 ML/MIN/1.73 M^2
GIANT PLATELETS BLD QL SMEAR: PRESENT
GLUCOSE SERPL-MCNC: 136 MG/DL (ref 70–110)
GLUCOSE UR QL STRIP: ABNORMAL
HCT VFR BLD AUTO: 35.6 % (ref 37–48.5)
HGB BLD-MCNC: 10.7 G/DL (ref 12–16)
HGB UR QL STRIP: ABNORMAL
HYPOCHROMIA BLD QL SMEAR: ABNORMAL
IMM GRANULOCYTES # BLD AUTO: 0.04 K/UL (ref 0–0.04)
IMM GRANULOCYTES NFR BLD AUTO: 0.8 % (ref 0–0.5)
KETONES UR QL STRIP: NEGATIVE
LACTATE SERPL-SCNC: 2.1 MMOL/L (ref 0.5–2.2)
LEUKOCYTE ESTERASE UR QL STRIP: NEGATIVE
LIPASE SERPL-CCNC: 107 U/L (ref 4–60)
LYMPHOCYTES # BLD AUTO: 0.9 K/UL (ref 1–4.8)
LYMPHOCYTES NFR BLD: 16.8 % (ref 18–48)
MCH RBC QN AUTO: 19.1 PG (ref 27–31)
MCHC RBC AUTO-ENTMCNC: 30.1 G/DL (ref 32–36)
MCV RBC AUTO: 64 FL (ref 82–98)
MONOCYTES # BLD AUTO: 0.5 K/UL (ref 0.3–1)
MONOCYTES NFR BLD: 9.2 % (ref 4–15)
NEUTROPHILS # BLD AUTO: 3.7 K/UL (ref 1.8–7.7)
NEUTROPHILS NFR BLD: 70.1 % (ref 38–73)
NITRITE UR QL STRIP: NEGATIVE
NRBC BLD-RTO: 5 /100 WBC
OVALOCYTES BLD QL SMEAR: ABNORMAL
PH UR STRIP: 6 [PH] (ref 5–8)
PLATELET # BLD AUTO: 165 K/UL (ref 150–450)
PLATELET BLD QL SMEAR: ABNORMAL
PMV BLD AUTO: ABNORMAL FL (ref 9.2–12.9)
POIKILOCYTOSIS BLD QL SMEAR: ABNORMAL
POLYCHROMASIA BLD QL SMEAR: ABNORMAL
POTASSIUM SERPL-SCNC: 5.1 MMOL/L (ref 3.5–5.1)
PROT SERPL-MCNC: 6.8 G/DL (ref 6–8.4)
PROT UR QL STRIP: NEGATIVE
RBC # BLD AUTO: 5.61 M/UL (ref 4–5.4)
SCHISTOCYTES BLD QL SMEAR: ABNORMAL
SCHISTOCYTES BLD QL SMEAR: PRESENT
SODIUM SERPL-SCNC: 137 MMOL/L (ref 136–145)
SP GR UR STRIP: <=1.005 (ref 1–1.03)
SPHEROCYTES BLD QL SMEAR: ABNORMAL
TARGETS BLD QL SMEAR: ABNORMAL
TROPONIN I SERPL DL<=0.01 NG/ML-MCNC: 2.01 NG/ML (ref 0–0.03)
URN SPEC COLLECT METH UR: ABNORMAL
UROBILINOGEN UR STRIP-ACNC: NEGATIVE EU/DL
WBC # BLD AUTO: 5.31 K/UL (ref 3.9–12.7)

## 2024-06-27 PROCEDURE — 85025 COMPLETE CBC W/AUTO DIFF WBC: CPT | Performed by: STUDENT IN AN ORGANIZED HEALTH CARE EDUCATION/TRAINING PROGRAM

## 2024-06-27 PROCEDURE — 63600175 PHARM REV CODE 636 W HCPCS: Performed by: STUDENT IN AN ORGANIZED HEALTH CARE EDUCATION/TRAINING PROGRAM

## 2024-06-27 PROCEDURE — 83605 ASSAY OF LACTIC ACID: CPT | Performed by: STUDENT IN AN ORGANIZED HEALTH CARE EDUCATION/TRAINING PROGRAM

## 2024-06-27 PROCEDURE — 81003 URINALYSIS AUTO W/O SCOPE: CPT | Performed by: STUDENT IN AN ORGANIZED HEALTH CARE EDUCATION/TRAINING PROGRAM

## 2024-06-27 PROCEDURE — 99285 EMERGENCY DEPT VISIT HI MDM: CPT | Mod: 25

## 2024-06-27 PROCEDURE — 99900031 HC PATIENT EDUCATION (STAT)

## 2024-06-27 PROCEDURE — 99900035 HC TECH TIME PER 15 MIN (STAT)

## 2024-06-27 PROCEDURE — 93005 ELECTROCARDIOGRAM TRACING: CPT

## 2024-06-27 PROCEDURE — 83690 ASSAY OF LIPASE: CPT | Performed by: STUDENT IN AN ORGANIZED HEALTH CARE EDUCATION/TRAINING PROGRAM

## 2024-06-27 PROCEDURE — 93010 ELECTROCARDIOGRAM REPORT: CPT | Mod: ,,, | Performed by: INTERNAL MEDICINE

## 2024-06-27 PROCEDURE — 96375 TX/PRO/DX INJ NEW DRUG ADDON: CPT

## 2024-06-27 PROCEDURE — 96374 THER/PROPH/DIAG INJ IV PUSH: CPT

## 2024-06-27 PROCEDURE — 84484 ASSAY OF TROPONIN QUANT: CPT | Performed by: STUDENT IN AN ORGANIZED HEALTH CARE EDUCATION/TRAINING PROGRAM

## 2024-06-27 PROCEDURE — 83880 ASSAY OF NATRIURETIC PEPTIDE: CPT | Performed by: STUDENT IN AN ORGANIZED HEALTH CARE EDUCATION/TRAINING PROGRAM

## 2024-06-27 PROCEDURE — 80053 COMPREHEN METABOLIC PANEL: CPT | Performed by: STUDENT IN AN ORGANIZED HEALTH CARE EDUCATION/TRAINING PROGRAM

## 2024-06-27 RX ORDER — DEXTROMETHORPHAN HYDROBROMIDE, GUAIFENESIN 5; 100 MG/5ML; MG/5ML
650 LIQUID ORAL EVERY 8 HOURS
Qty: 21 TABLET | Refills: 0 | Status: SHIPPED | OUTPATIENT
Start: 2024-06-27

## 2024-06-27 RX ORDER — ONDANSETRON HYDROCHLORIDE 2 MG/ML
4 INJECTION, SOLUTION INTRAVENOUS
Status: COMPLETED | OUTPATIENT
Start: 2024-06-27 | End: 2024-06-27

## 2024-06-27 RX ORDER — FUROSEMIDE 10 MG/ML
80 INJECTION INTRAMUSCULAR; INTRAVENOUS
Status: COMPLETED | OUTPATIENT
Start: 2024-06-27 | End: 2024-06-27

## 2024-06-27 RX ADMIN — FUROSEMIDE 80 MG: 10 INJECTION, SOLUTION INTRAMUSCULAR; INTRAVENOUS at 11:06

## 2024-06-27 RX ADMIN — ONDANSETRON 4 MG: 2 INJECTION INTRAMUSCULAR; INTRAVENOUS at 09:06

## 2024-06-28 ENCOUNTER — OFFICE VISIT (OUTPATIENT)
Dept: FAMILY MEDICINE | Facility: CLINIC | Age: 59
End: 2024-06-28
Payer: MEDICAID

## 2024-06-28 ENCOUNTER — APPOINTMENT (OUTPATIENT)
Dept: RADIOLOGY | Facility: CLINIC | Age: 59
End: 2024-06-28
Attending: STUDENT IN AN ORGANIZED HEALTH CARE EDUCATION/TRAINING PROGRAM
Payer: MEDICAID

## 2024-06-28 ENCOUNTER — TELEPHONE (OUTPATIENT)
Dept: FAMILY MEDICINE | Facility: CLINIC | Age: 59
End: 2024-06-28
Payer: MEDICAID

## 2024-06-28 ENCOUNTER — CLINICAL SUPPORT (OUTPATIENT)
Dept: FAMILY MEDICINE | Facility: CLINIC | Age: 59
End: 2024-06-28
Payer: MEDICAID

## 2024-06-28 VITALS
BODY MASS INDEX: 28.21 KG/M2 | HEIGHT: 66 IN | TEMPERATURE: 96 F | HEART RATE: 105 BPM | SYSTOLIC BLOOD PRESSURE: 100 MMHG | OXYGEN SATURATION: 96 % | WEIGHT: 175.5 LBS | DIASTOLIC BLOOD PRESSURE: 80 MMHG | RESPIRATION RATE: 24 BRPM

## 2024-06-28 DIAGNOSIS — R52 BODY ACHES: ICD-10-CM

## 2024-06-28 DIAGNOSIS — R05.9 COUGH, UNSPECIFIED TYPE: Primary | ICD-10-CM

## 2024-06-28 DIAGNOSIS — R10.84 GENERALIZED ABDOMINAL PAIN: ICD-10-CM

## 2024-06-28 DIAGNOSIS — I50.43 ACUTE ON CHRONIC COMBINED SYSTOLIC AND DIASTOLIC HEART FAILURE: ICD-10-CM

## 2024-06-28 LAB
CTP QC/QA: YES
OHS QRS DURATION: 116 MS
OHS QTC CALCULATION: 474 MS
POC MOLECULAR INFLUENZA A AGN: NEGATIVE
POC MOLECULAR INFLUENZA B AGN: NEGATIVE
S PYO RRNA THROAT QL PROBE: NEGATIVE
SARS-COV-2 RDRP RESP QL NAA+PROBE: NEGATIVE

## 2024-06-28 PROCEDURE — 74018 RADEX ABDOMEN 1 VIEW: CPT | Mod: 26,,, | Performed by: RADIOLOGY

## 2024-06-28 PROCEDURE — 99999 PR PBB SHADOW E&M-EST. PATIENT-LVL III: CPT | Mod: PBBFAC,,, | Performed by: STUDENT IN AN ORGANIZED HEALTH CARE EDUCATION/TRAINING PROGRAM

## 2024-06-28 PROCEDURE — 99213 OFFICE O/P EST LOW 20 MIN: CPT | Mod: PBBFAC,25 | Performed by: STUDENT IN AN ORGANIZED HEALTH CARE EDUCATION/TRAINING PROGRAM

## 2024-06-28 PROCEDURE — 74018 RADEX ABDOMEN 1 VIEW: CPT | Mod: TC,PO

## 2024-06-28 RX ORDER — FUROSEMIDE 10 MG/ML
20 INJECTION INTRAMUSCULAR; INTRAVENOUS
Status: COMPLETED | OUTPATIENT
Start: 2024-06-28 | End: 2024-06-28

## 2024-06-28 RX ORDER — DICYCLOMINE HYDROCHLORIDE 20 MG/1
20 TABLET ORAL 3 TIMES DAILY PRN
Qty: 30 TABLET | Refills: 1 | Status: SHIPPED | OUTPATIENT
Start: 2024-06-28 | End: 2024-07-28

## 2024-06-28 RX ORDER — FUROSEMIDE 10 MG/ML
20 INJECTION INTRAMUSCULAR; INTRAVENOUS
Status: DISCONTINUED | OUTPATIENT
Start: 2024-06-28 | End: 2024-06-28

## 2024-06-28 RX ADMIN — FUROSEMIDE 20 MG: 10 INJECTION INTRAMUSCULAR; INTRAVENOUS at 03:06

## 2024-06-28 NOTE — TELEPHONE ENCOUNTER
----- Message from Frank Cook sent at 2024 11:25 AM CDT -----  Contact: Patient  Hafsa Hawley  MRN: [id number]  : 1965  PCP: Cristobal Ann  Home Phone      [phone]  Work Phone      Not on file.  Mobile          [phone]      MESSAGE: seen in ER yesterday - to to f/u with PCP as soon as possible -- requesting appt    Call [phone]    PCP: Seth

## 2024-06-28 NOTE — ED PROVIDER NOTES
"Encounter Date: 6/27/2024       History     Chief Complaint   Patient presents with    Shortness of Breath     Patient reports SOB, pain and swelling in abdomen.  Onset 2 days ago.  Patient on continuous Dobutamine infusion.  States she has been using home O2 for the last few days - 2L.  No fever.  Reports productive cough with clear sputum and vomiting.    Abdominal Pain     58-year-old female with heart failure on dobutamine infusion, AFib, prior DVT, CKD, GERD, hypertension, diabetes, sleep apnea, presenting with complaints of mild abdominal pain and shortness of breath.  Patient reports this has been present for the last few weeks.  Patient was seen for this previously, and had a negative workup consistent with a continuation of her congestive heart failure.  Patient reports regular bowel movements, last one earlier today.      Review of patient's allergies indicates:   Allergen Reactions    Penicillins Hives and Other (See Comments)    Iodinated contrast media Nausea And Vomiting    Oxycodone-acetaminophen Other (See Comments)     Nausea, Dizziness, Anxiety.  "I don't like how it makes me feel."   Given Hydromorphone 0.5mg IVP  Without problems.  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 Metabolism of other drugs not listed here may also be impacted by UGT1A1 enzyme activity.       Tramadol Nausea And Vomiting and Other (See Comments)     Other reaction(s): Other " (See Comments)    Valsartan Other (See Comments)     Past Medical History:   Diagnosis Date    Allergy     Anemia     Arthritis     Atrial fibrillation     OAC    BMI 32.0-32.9,adult 02/22/2023    Chronic respiratory failure with hypoxia, on home oxygen therapy     2L with activity, off at rest.  Per Pulm  no overt evidence of ILD or COPD on PFTs and CT to explain O2 needs.    CKD (chronic kidney disease), stage IV 05/08/2018    Congestive heart failure     s/p AICD placement,    Deep vein thrombosis     Depression     elevated bilirubin d/t Gilbert's syndrome     confirmed by [place] genetic testing, evaluated by hepatology    Encounter for blood transfusion     GERD (gastroesophageal reflux disease)     Hypertension     Pheochromocytoma, malignant     Right kidney mass     Sleep apnea     Thalassemia trait, alpha     Thyroid disease     Type 2 diabetes mellitus with hyperglycemia, without long-term current use of insulin 08/13/2020     Past Surgical History:   Procedure Laterality Date    ANGIOGRAM, ABDOMINAL AORTA Right 04/15/2024    APPENDECTOMY      BONE MARROW BIOPSY      CARDIAC DEFIBRILLATOR PLACEMENT Left 12/2016    CARDIAC ELECTROPHYSIOLOGY MAPPING AND ABLATION      CARDIAC ELECTROPHYSIOLOGY MAPPING AND ABLATION      COLONOSCOPY N/A 05/06/2022    Procedure: COLONOSCOPY;  Surgeon: Arely Betancourt MD;  Location: [facility] ([address] [place] [address]);  Service: Endoscopy;  Laterality: N/A;  heart transplant candidate/ EF 25% - 2nd floor/ defib - Biotronik - ERW  Eliquis - per Dr. Cortez with [facility], Pt ok to hold Eliquis x 2 days prior-see media tab-outside correspondence dated 12/30/21  - [facility]  verbal instructions/portal instructions/email instructions - s    EYE SURGERY      due to running tears    FRACTURE SURGERY Left     hand 5th digit    HYSTERECTOMY      KNEE SURGERY Left 2016    hematoma    LIVER BIOPSY  10/24/2018    Minimal steatosis, predominantly macrovesicular, 1%, Minimal nonspecific portal inflammation, no  fibrosis. No findings on biopsy to explain elevated bilirubin levels. Could be d/t Gilbert's =?- hemolysis    RIGHT HEART CATHETERIZATION Right 2021    Procedure: INSERTION, CATHETER, RIGHT HEART;  Surgeon: Irving Cardenas MD;  Location: [facility] CATH LAB;  Service: Cardiology;  Laterality: Right;    RIGHT HEART CATHETERIZATION Right 2022    Procedure: INSERTION, CATHETER, RIGHT HEART;  Surgeon: Burke Camilo MD;  Location: [facility] CATH LAB;  Service: Cardiology;  Laterality: Right;    RIGHT HEART CATHETERIZATION Right 2023    Procedure: INSERTION, CATHETER, RIGHT HEART;  Surgeon: Katie Liriano DO;  Location: [facility] CATH LAB;  Service: Cardiology;  Laterality: Right;    TRANSJUGULAR BIOPSY OF LIVER N/A 10/24/2018    Procedure: BIOPSY, LIVER, TRANSJUGULAR APPROACH;  Surgeon: Carmen Diagnostic Provider;  Location: [facility] OR [address] [place] [address];  Service: Radiology;  Laterality: N/A;     Family History   Problem Relation Name Age of Onset    Cancer Mother          pancreatic CA early 50's    Heart disease Father           MI in late 50's    Hypertension Father      Heart attack Father      Heart disease Sister      Heart disease Brother      Cirrhosis Brother          alcoholic    Heart disease Sister      Heart disease Brother      Hypertension Brother      Diabetes Brother       Social History     Tobacco Use    Smoking status: Never    Smokeless tobacco: Never   Substance Use Topics    Alcohol use: Not Currently     Comment: up to 1 yr ago drank 2-3 drinks on occasion but sporadic    Drug use: No     Review of Systems   Constitutional:  Negative for fever.   HENT:  Negative for sore throat.    Respiratory:  Positive for shortness of breath.    Cardiovascular:  Positive for chest pain.   Gastrointestinal:  Positive for abdominal pain. Negative for diarrhea, nausea and vomiting.   Genitourinary:  Negative for dysuria.   Musculoskeletal:  Negative for back pain.   Skin:  Negative for rash.   Neurological:   Negative for weakness.   Hematological:  Does not bruise/bleed easily.       Physical Exam     Initial Vitals [06/27/24 2013]   BP Pulse Resp Temp SpO2   130/82 105 20 97.8 °F (36.6 °C) 95 %      MAP       --         Physical Exam    Nursing note and vitals reviewed.  Constitutional: She appears well-developed.   HENT:   Head: Normocephalic.   Eyes: Pupils are equal, round, and reactive to light.   Neck:   Normal range of motion.  Cardiovascular:            No murmur heard.  Pulmonary/Chest: No respiratory distress.   A small amount of wheezing bilaterally.  Crackles at bases.  Good air movement.   Abdominal: Abdomen is soft.   No TTP diffusely. No guarding, rebound, or masses. Negative Garnica's sign. No TTP at McBurney's point. No CVAT bilaterally.     Musculoskeletal:         General: No edema.      Cervical back: Normal range of motion.     Neurological: She is alert.   Skin: Skin is warm.   Psychiatric: She has a normal mood and affect.         ED Course   Procedures  Labs Reviewed   CBC W/ AUTO DIFFERENTIAL - Abnormal; Notable for the following components:       Result Value    RBC 5.61 (*)     Hemoglobin 10.7 (*)     Hematocrit 35.6 (*)     MCV 64 (*)     MCH 19.1 (*)     MCHC 30.1 (*)     RDW 29.1 (*)     Immature Granulocytes 0.8 (*)     Lymph # 0.9 (*)     nRBC 5 (*)     Lymph % 16.8 (*)     All other components within normal limits   COMPREHENSIVE METABOLIC PANEL - Abnormal; Notable for the following components:    Glucose 136 (*)     BUN 81 (*)     Creatinine 3.6 (*)     Total Bilirubin 1.7 (*)     eGFR 14 (*)     All other components within normal limits   B-TYPE NATRIURETIC PEPTIDE - Abnormal; Notable for the following components:    BNP 1,998 (*)     All other components within normal limits   TROPONIN I - Abnormal; Notable for the following components:    Troponin I 2.014 (*)     All other components within normal limits   LIPASE - Abnormal; Notable for the following components:    Lipase 107 (*)      All other components within normal limits   URINALYSIS, REFLEX TO URINE CULTURE - Abnormal; Notable for the following components:    Specific Gravity, UA <=1.005 (*)     Glucose, UA Trace (*)     Occult Blood UA Trace (*)     All other components within normal limits    Narrative:     Specimen Source->Urine   LACTIC ACID, PLASMA     EKG Readings: (Independently Interpreted)   Initial Reading: No STEMI. Rhythm: Sinus Tachycardia. Heart Rate: 103. Ectopy: No Ectopy. Conduction: Normal.     ECG Results              EKG 12-lead (Final result)        Collection Time Result Time QRS Duration OHS QTC Calculation    06/27/24 20:45:05 06/28/24 14:26:50 116 474                     Final result by Interface, Lab In [facility] (06/28/24 14:26:52)                   Narrative:    Test Reason : R06.02    Vent. Rate : 103 BPM     Atrial Rate : 103 BPM     P-R Int : 208 ms          QRS Dur : 116 ms      QT Int : 362 ms       P-R-T Axes : 064 -63 095 degrees     QTc Int : 474 ms    Sinus tachycardia with 1st degree A-V block  Left atrial enlargement  Left anterior fascicular block  Voltage criteria for left ventricular hypertrophy  Nonspecific ST and/or T wave abnormalities  Abnormal ECG  When compared with ECG of 05-JUN-2024 18:05,  No significant change was found  Confirmed by Parviz Dewey MD ([id number]) on 6/28/2024 2:26:44 PM    Referred By: DAYANNAERR   SELF           Confirmed By:Parviz Dewey MD                                  Imaging Results              X-Ray Chest AP Portable (Final result)  Result time 06/27/24 22:33:08      Final result by Rogelio Motta DO (06/27/24 22:33:08)                   Impression:      No acute abnormality.    Cardiomegaly.      Electronically signed by: Rogelio Motta  Date:    06/27/2024  Time:    22:33               Narrative:    EXAMINATION:  XR CHEST AP PORTABLE    CLINICAL HISTORY:  sob;    TECHNIQUE:  Single frontal view of the chest was  performed.    COMPARISON:  05/05/2024.    FINDINGS:  There is a cardiac pacer/AICD, unchanged.  There is a right sided PICC with the tip overlying the mid SVC.  The lungs are well expanded and clear.  No focal opacities are seen.  The pleural spaces are clear.  The cardiac silhouette is enlarged.  Osseous structures are intact                                       Medications   ondansetron injection 4 mg (4 mg Intravenous Given 6/27/24 2105)   furosemide injection 80 mg (80 mg Intravenous Given 6/27/24 2300)     Medical Decision Making  DDX:  Patient with known significant heart failure.  Symptoms possibly due to heart failure and fluid overload.  Will rule out ACS, arrhythmia, electrolyte/metabolic abnormality.  Patient has a benign abdomen on my examination, low suspicion for acute intra-abdominal pathology.  DX:  CBC, CMP, lipase, lactate, BNP, troponin, EKG, chest x-ray  TX:  Pending workup  Dispo:  Pending workup        Amount and/or Complexity of Data Reviewed  Labs: ordered. Decision-making details documented in ED Course.  Radiology: ordered.    Risk  OTC drugs.  Prescription drug management.               ED Course as of 07/04/24 1855   Thu Jun 27, 2024 2134 Troponin I(!): 2.014  In line with patient's baseline. [NB]   2246 No new findings on patient's blood work, these are all chronic issues.  Will discharge with instructions to follow up with PCP.  Patient in agreement with this plan. [NB]      ED Course User Index  [NB] Efe Norris MD                           Clinical Impression:  Final diagnoses:  [R06.02] Shortness of breath (Primary)          ED Disposition Condition    Discharge Stable          ED Prescriptions       Medication Sig Dispense Start Date End Date Auth. Provider    acetaminophen (TYLENOL) 650 MG TbSR Take 1 tablet (650 mg total) by mouth every 8 (eight) hours. 21 tablet 6/27/2024 -- Efe Norris MD          Follow-up Information       Follow up With Specialties  Details Why Contact Info    Cristobal Ann MD Family Medicine Schedule an appointment as soon as possible for a visit in 2 days  [address] [facility] [address]  [phone]      [facility] - Emergency Dept Emergency Medicine  If symptoms worsen [facility] [facility] [address]  [phone]             Efe Norris MD  06/27/24 2035       Efe Norris MD  06/27/24 2052       Efe Norris MD  07/04/24 3014

## 2024-06-28 NOTE — PROGRESS NOTES
Subjective:       Patient ID: Hafsa Hawley is a 58 y.o. female.    Chief Complaint: Follow-up (Patient is here today for an emergency room follow up visit. Patient was seen at [place] ER on 06/27/2024 for shortness of breath. )    Pt here for ER follow-up. She was seen 06/27/24 for SOB. She was seen for acute worsening of her SOB. She has end-stage heart failure and is on dobutamine outpatient. She is followed by [facility], cardiology. Workup in the ER was mostly unrevealing aside from slight increase in her baseline BNP. Her CXR showed no acute abnormality. She was given IV lasix and discharged home. She reports for the last 2 days she has noticed worsening SOB and some intermittent wheezing. She is coughing up lots of mucus. She has had worsening of her chronic nausea. No fever/chills.     Review of Systems   Constitutional:  Positive for activity change. Negative for chills and fever.   HENT:  Negative for congestion, rhinorrhea and sore throat.    Respiratory:  Positive for cough and shortness of breath.    Cardiovascular:  Negative for chest pain and palpitations.   Gastrointestinal:  Positive for diarrhea (chronic) and nausea (chronic). Negative for abdominal pain and vomiting.   Genitourinary:  Negative for dysuria and hematuria.   Musculoskeletal:  Positive for arthralgias.   Skin:  Negative for rash.       Objective:      Physical Exam  Vitals reviewed.   HENT:      Head: Normocephalic and atraumatic.   Eyes:      Conjunctiva/sclera: Conjunctivae normal.   Cardiovascular:      Rate and Rhythm: Normal rate and regular rhythm.      Heart sounds: Normal heart sounds. No murmur heard.  Pulmonary:      Effort: Pulmonary effort is normal. No respiratory distress.      Breath sounds: Normal breath sounds.   Abdominal:      General: Bowel sounds are normal.      Palpations: Abdomen is soft.   Musculoskeletal:         General: No deformity.      Right lower leg: No edema.      Left lower leg: No edema.    Neurological:      Mental Status: She is alert and oriented to person, place, and time.   Psychiatric:         Mood and Affect: Mood normal.         Behavior: Behavior normal.         Assessment:       1. Cough, unspecified type    2. Body aches    3. Acute on chronic combined systolic and diastolic heart failure    4. Generalized abdominal pain        Plan:           1. Cough, unspecified type  -     POCT Rapid Strep A  -     POCT COVID-19 Rapid Screening  -     POCT Influenza A/B Molecular    2. Body aches  -     POCT Rapid Strep A  -     POCT COVID-19 Rapid Screening  -     POCT Influenza A/B Molecular    3. Acute on chronic combined systolic and diastolic heart failure  -     furosemide injection 20 mg    4. Generalized abdominal pain  -     X-Ray Abdomen AP 1 View; Future; Expected date: 06/28/2024  -     dicyclomine (BENTYL) 20 mg tablet; Take 1 tablet (20 mg total) by mouth 3 (three) times daily as needed (abd pain).  Dispense: 30 tablet; Refill: 1    Covid: negative  Flu: negative  Strep: negative    KUB: no acute process    Cont to hold ozempic  Bentyl PRN  Lasix IM x1  Follow-up cardiology as scheduled  RTC for worsening symptoms or failure to improve, or RTC PRN/as scheduled

## 2024-07-01 ENCOUNTER — INFUSION (OUTPATIENT)
Dept: INFUSION THERAPY | Facility: HOSPITAL | Age: 59
End: 2024-07-01
Attending: INTERNAL MEDICINE
Payer: MEDICAID

## 2024-07-01 VITALS
SYSTOLIC BLOOD PRESSURE: 100 MMHG | RESPIRATION RATE: 20 BRPM | HEART RATE: 88 BPM | TEMPERATURE: 97 F | DIASTOLIC BLOOD PRESSURE: 69 MMHG

## 2024-07-01 DIAGNOSIS — I42.9 CARDIOMYOPATHY, UNSPECIFIED TYPE: Primary | ICD-10-CM

## 2024-07-01 LAB
ALBUMIN SERPL BCP-MCNC: 3.6 G/DL (ref 3.5–5.2)
ALP SERPL-CCNC: 52 U/L (ref 55–135)
ALT SERPL W/O P-5'-P-CCNC: 15 U/L (ref 10–44)
ANION GAP SERPL CALC-SCNC: 14 MMOL/L (ref 8–16)
ANISOCYTOSIS BLD QL SMEAR: ABNORMAL
AST SERPL-CCNC: 17 U/L (ref 10–40)
BASOPHILS # BLD AUTO: 0.02 K/UL (ref 0–0.2)
BASOPHILS NFR BLD: 0.4 % (ref 0–1.9)
BILIRUB SERPL-MCNC: 1.9 MG/DL (ref 0.1–1)
BNP SERPL-MCNC: 2264 PG/ML (ref 0–99)
BUN SERPL-MCNC: 87 MG/DL (ref 6–20)
CALCIUM SERPL-MCNC: 9.5 MG/DL (ref 8.7–10.5)
CHLORIDE SERPL-SCNC: 98 MMOL/L (ref 95–110)
CO2 SERPL-SCNC: 28 MMOL/L (ref 23–29)
CREAT SERPL-MCNC: 4.3 MG/DL (ref 0.5–1.4)
DACRYOCYTES BLD QL SMEAR: ABNORMAL
DIFFERENTIAL METHOD BLD: ABNORMAL
EOSINOPHIL # BLD AUTO: 0.1 K/UL (ref 0–0.5)
EOSINOPHIL NFR BLD: 1.6 % (ref 0–8)
ERYTHROCYTE [DISTWIDTH] IN BLOOD BY AUTOMATED COUNT: 29.1 % (ref 11.5–14.5)
EST. GFR  (NO RACE VARIABLE): 11 ML/MIN/1.73 M^2
GIANT PLATELETS BLD QL SMEAR: PRESENT
GLUCOSE SERPL-MCNC: 156 MG/DL (ref 70–110)
HCT VFR BLD AUTO: 35 % (ref 37–48.5)
HGB BLD-MCNC: 10.3 G/DL (ref 12–16)
HYPOCHROMIA BLD QL SMEAR: ABNORMAL
IMM GRANULOCYTES # BLD AUTO: 0.03 K/UL (ref 0–0.04)
IMM GRANULOCYTES NFR BLD AUTO: 0.6 % (ref 0–0.5)
LYMPHOCYTES # BLD AUTO: 0.8 K/UL (ref 1–4.8)
LYMPHOCYTES NFR BLD: 16.4 % (ref 18–48)
MAGNESIUM SERPL-MCNC: 1.8 MG/DL (ref 1.6–2.6)
MCH RBC QN AUTO: 18.9 PG (ref 27–31)
MCHC RBC AUTO-ENTMCNC: 29.4 G/DL (ref 32–36)
MCV RBC AUTO: 64 FL (ref 82–98)
MONOCYTES # BLD AUTO: 0.5 K/UL (ref 0.3–1)
MONOCYTES NFR BLD: 9.9 % (ref 4–15)
NEUTROPHILS # BLD AUTO: 3.6 K/UL (ref 1.8–7.7)
NEUTROPHILS NFR BLD: 71.1 % (ref 38–73)
NRBC BLD-RTO: 6 /100 WBC
OVALOCYTES BLD QL SMEAR: ABNORMAL
PHOSPHATE SERPL-MCNC: 4.3 MG/DL (ref 2.7–4.5)
PLATELET # BLD AUTO: 168 K/UL (ref 150–450)
PLATELET BLD QL SMEAR: ABNORMAL
PMV BLD AUTO: ABNORMAL FL (ref 9.2–12.9)
POIKILOCYTOSIS BLD QL SMEAR: ABNORMAL
POLYCHROMASIA BLD QL SMEAR: ABNORMAL
POTASSIUM SERPL-SCNC: 4 MMOL/L (ref 3.5–5.1)
PROT SERPL-MCNC: 6.3 G/DL (ref 6–8.4)
RBC # BLD AUTO: 5.46 M/UL (ref 4–5.4)
SCHISTOCYTES BLD QL SMEAR: ABNORMAL
SCHISTOCYTES BLD QL SMEAR: PRESENT
SODIUM SERPL-SCNC: 140 MMOL/L (ref 136–145)
SPHEROCYTES BLD QL SMEAR: ABNORMAL
TARGETS BLD QL SMEAR: ABNORMAL
WBC # BLD AUTO: 5.06 K/UL (ref 3.9–12.7)

## 2024-07-01 PROCEDURE — 83735 ASSAY OF MAGNESIUM: CPT | Performed by: STUDENT IN AN ORGANIZED HEALTH CARE EDUCATION/TRAINING PROGRAM

## 2024-07-01 PROCEDURE — 36415 COLL VENOUS BLD VENIPUNCTURE: CPT | Performed by: STUDENT IN AN ORGANIZED HEALTH CARE EDUCATION/TRAINING PROGRAM

## 2024-07-01 PROCEDURE — 84100 ASSAY OF PHOSPHORUS: CPT | Performed by: STUDENT IN AN ORGANIZED HEALTH CARE EDUCATION/TRAINING PROGRAM

## 2024-07-01 PROCEDURE — 80053 COMPREHEN METABOLIC PANEL: CPT | Performed by: STUDENT IN AN ORGANIZED HEALTH CARE EDUCATION/TRAINING PROGRAM

## 2024-07-01 PROCEDURE — 83880 ASSAY OF NATRIURETIC PEPTIDE: CPT | Performed by: STUDENT IN AN ORGANIZED HEALTH CARE EDUCATION/TRAINING PROGRAM

## 2024-07-01 PROCEDURE — 36591 DRAW BLOOD OFF VENOUS DEVICE: CPT

## 2024-07-01 PROCEDURE — 85025 COMPLETE CBC W/AUTO DIFF WBC: CPT | Performed by: STUDENT IN AN ORGANIZED HEALTH CARE EDUCATION/TRAINING PROGRAM

## 2024-07-01 NOTE — PROGRESS NOTES
Blood drawn via PICC line.  Right upper arm PICC line dressing change done using sterile technique done   No signs of infection noted.  Stat lock device  bio patch and tegaderm dressing reapplied

## 2024-07-02 ENCOUNTER — OFFICE VISIT (OUTPATIENT)
Dept: PALLIATIVE MEDICINE | Facility: CLINIC | Age: 59
End: 2024-07-02
Payer: MEDICAID

## 2024-07-02 DIAGNOSIS — R11.0 NAUSEA: ICD-10-CM

## 2024-07-02 DIAGNOSIS — R52 PAIN: ICD-10-CM

## 2024-07-02 DIAGNOSIS — I50.9 CONGESTIVE HEART FAILURE, UNSPECIFIED HF CHRONICITY, UNSPECIFIED HEART FAILURE TYPE: Primary | ICD-10-CM

## 2024-07-02 RX ORDER — HYDROCODONE BITARTRATE AND ACETAMINOPHEN 10; 325 MG/1; MG/1
1 TABLET ORAL EVERY 6 HOURS PRN
Qty: 120 TABLET | Refills: 0 | Status: SHIPPED | OUTPATIENT
Start: 2024-07-02

## 2024-07-02 RX ORDER — NITROGLYCERIN 0.4 MG/1
0.4 TABLET SUBLINGUAL EVERY 5 MIN PRN
Qty: 25 TABLET | Refills: 2 | Status: SHIPPED | OUTPATIENT
Start: 2024-07-02 | End: 2025-07-02

## 2024-07-02 RX ORDER — ONDANSETRON 8 MG/1
8 TABLET, ORALLY DISINTEGRATING ORAL EVERY 8 HOURS PRN
Qty: 30 TABLET | Refills: 4 | Status: SHIPPED | OUTPATIENT
Start: 2024-07-02

## 2024-07-02 NOTE — PROGRESS NOTES
"Established Patient - Audio Only Telehealth Visit     The patient location is: [facility]   The chief complaint leading to consultation is: pain  Visit type: Virtual visit with audio only (telephone)  Total time spent with patient: 35min       The reason for the audio only service rather than synchronous audio and video virtual visit was related to technical difficulties or patient preference/necessity.     Each patient to whom I provide medical services by telemedicine is:  (1) informed of the relationship between the physician and patient and the respective role of any other health care provider with respect to management of the patient; and (2) notified that they may decline to receive medical services by telemedicine and may withdraw from such care at any time. Patient verbally consented to receive this service via voice-only telephone call.       HPI:   The patient presents with ongoing bilateral severe upper abdominal pain that started 1.5 weeks ago, radiating to her ribs, breast area, and mid back, causing difficulty with walking and breathing. She describes the pain as a "bad period cramp" and reports associated nausea, inability to eat, and a feeling of fullness in her abdomen, which is "blown up hard and heavy." The patient experiences nausea with any oral intake and has been vomiting throughout the night. The patient visited her PCP and the emergency room, where she received Lasix treatment. She has been taking her prescribed Bumex and Metolazone for fluid management, and her weight has decreased from 170-172 lbs to 158 lbs. The patient is on a dobutamine infusion. She reports feeling worse since being prescribed hydralazine, which replaced her previous medication, Entresto. Patient reports palpitations, with her heart feeling like it wants to "run out of her shirt." The patient expresses difficulty climbing stairs without becoming short of breath, a significant change from a few months ago. She feels " "like her body is "shutting down." She inquires about the possibility of being relisted for a heart and kidney transplant at other organizations or hospitals. The patient also mentions a history of severe menstrual cramps since age 16, which resolved after a hysterectomy for fibroids. She has had floaters and pain in her left eye, with persistent redness and swelling. She plans to schedule a follow-up visit with her eye doctor and update her glasses prescription.  Patient is able to climb up the steps, but gets out of breath doing so. A few months ago, patient was able to go up and down the steps without feeling this way. Patient has to go up and down the steps slowly. Patient spends most of the time sitting down or laying down. Patient is waking up tired, not getting sleep because breathing is impaired.    Advance Care Planning     Date: 07/02/2024    I engaged the patient in a voluntary conversation about advance care planning and we specifically addressed what the goals of care would be moving forward, in light of the patient's change in clinical status, specifically worsening HF symptoms.  We did specifically address the patient's likely prognosis, which is poor.  We explored the patient's values and preferences for future care.  The patient endorses that what is most important right now is to focus on extending life as long as possible, even it it means sacrificing quality and curative/life-prolongation (regardless of treatment burdens). The patient expressed a strong desire to be evaluated for heart and kidney transplant eligibility, stating "I just need some help with getting things done" in reference to the transplant workup process. This highlights the importance of exploring all potential treatment options for the patient. The patient is willing to relocate to [place] for the transplant evaluation and potential surgery, demonstrating commitment to the process.I did explain the role for hospice care at this " "stage of the patient's illness, including its ability to help the patient live with the best quality of life possible The patient and provider discussed hospice as an alternative option if the transplant is not a possibility, but the patient stated "I don't feel that weak enough to be doing hospice" at this time, when we dsicussed the assistance w/ sx management she shared that she feels  "I've been doing it on my own."  Accordingly, we have decided that the best plan to meet the patient's goals includes continuing with treatment              Assessment and plan:     HEART FAILURE AND KIDNEY DYSFUNCTION:  - Recognizing patient's worsening heart failure and kidney dysfunction despite maximal medical therapy.  - Patient is hoping for  potential eligibility for heart and kidney transplant evaluation at out-of-state transplant centers. Discussed hospice as an alternative     CARDIAC PAIN:  - Prescribed nitroglycerine for suspected cardiac pain.  - Started nitroglycerine for suspected cardiac pain.    NAUSEA:  - Refilled antiemetic (Zofran) for persistent nausea.  - Refilled Zofran for nausea and provided additional refills.    PAIN MANAGEMENT:  - Continued Norco for pain.      FOLLOW-UP AND APPOINTMENTS:  - Plan to see patient in-person to re-evaluate clinical status.  - Contact office if any openings for earlier appointment.                A total of 17 min was spent on advance care planning, goals of care discussion, emotional support, formulating and communicating prognosis and exploring burden/benefit of various approaches of treatment. This discussion occurred on a fully voluntary basis with the verbal consent of the patient and/or family.      This service was not originating from a related E/M service provided within the previous 7 days nor will  to an E/M service or procedure within the next 24 hours or my soonest available appointment.  Prevailing standard of care was able to be met in this audio-only " visit.

## 2024-07-02 NOTE — PROGRESS NOTES
Please inform the pt recent labs stable aside from an increase in her BNP which can indicate worsening heart failure. Is she feeling any better? Does she have follow-up with [facility] soon?

## 2024-07-04 NOTE — Clinical Note
RN has spoken with patient about plan of care and interventions. All questions and concerns addressed at this time. No further issues at this time.     The PA catheter was repositioned to the right ventricle. Hemodynamics were performed.

## 2024-07-05 ENCOUNTER — OFFICE VISIT (OUTPATIENT)
Dept: PALLIATIVE MEDICINE | Facility: CLINIC | Age: 59
End: 2024-07-05
Payer: MEDICAID

## 2024-07-05 VITALS
BODY MASS INDEX: 28.04 KG/M2 | WEIGHT: 173.75 LBS | DIASTOLIC BLOOD PRESSURE: 82 MMHG | SYSTOLIC BLOOD PRESSURE: 123 MMHG | HEART RATE: 108 BPM | OXYGEN SATURATION: 96 %

## 2024-07-05 DIAGNOSIS — R52 PAIN: ICD-10-CM

## 2024-07-05 DIAGNOSIS — F41.9 ANXIETY: ICD-10-CM

## 2024-07-05 DIAGNOSIS — K59.00 CONSTIPATION, UNSPECIFIED CONSTIPATION TYPE: ICD-10-CM

## 2024-07-05 DIAGNOSIS — M10.9 GOUT, ARTHRITIS: ICD-10-CM

## 2024-07-05 DIAGNOSIS — R06.00 DYSPNEA, UNSPECIFIED TYPE: ICD-10-CM

## 2024-07-05 DIAGNOSIS — G62.9 NEUROPATHY: ICD-10-CM

## 2024-07-05 DIAGNOSIS — I50.9 CONGESTIVE HEART FAILURE, UNSPECIFIED HF CHRONICITY, UNSPECIFIED HEART FAILURE TYPE: Primary | ICD-10-CM

## 2024-07-05 PROCEDURE — 99212 OFFICE O/P EST SF 10 MIN: CPT | Mod: PBBFAC | Performed by: STUDENT IN AN ORGANIZED HEALTH CARE EDUCATION/TRAINING PROGRAM

## 2024-07-05 PROCEDURE — 99999 PR PBB SHADOW E&M-EST. PATIENT-LVL II: CPT | Mod: PBBFAC,,, | Performed by: STUDENT IN AN ORGANIZED HEALTH CARE EDUCATION/TRAINING PROGRAM

## 2024-07-05 RX ORDER — FENTANYL 12.5 UG/1
1 PATCH TRANSDERMAL
Qty: 5 PATCH | Refills: 0 | Status: SHIPPED | OUTPATIENT
Start: 2024-07-05

## 2024-07-05 RX ORDER — PREGABALIN 50 MG/1
50 CAPSULE ORAL NIGHTLY
Qty: 30 CAPSULE | Refills: 2 | Status: SHIPPED | OUTPATIENT
Start: 2024-07-05 | End: 2024-10-03

## 2024-07-05 RX ORDER — SERTRALINE HYDROCHLORIDE 100 MG/1
100 TABLET, FILM COATED ORAL DAILY
Qty: 30 TABLET | Refills: 11 | Status: SHIPPED | OUTPATIENT
Start: 2024-07-05 | End: 2025-07-05

## 2024-07-05 RX ORDER — POLYETHYLENE GLYCOL 3350 17 G/17G
17 POWDER, FOR SOLUTION ORAL DAILY
Qty: 510 G | Refills: 0 | Status: SHIPPED | OUTPATIENT
Start: 2024-07-05

## 2024-07-05 NOTE — PROGRESS NOTES
Palliative Medicine Clinic Note      Consult Requested By: No ref. provider found    Primary Care Physician:   Cristobal Ann MD    Reason for Consult: Advance care planning and symptom management in the setting of heart Failure     In person     ASSESSMENT/PLAN:     Plan/Recommendations:  Diagnoses and all orders for this visit:    Congestive heart failure, unspecified HF chronicity, unspecified heart failure type /  Acute decompensated heart failure / Pulmonary HTN  -NICM since 2013 HFrEF (EF 10%) s/p AICD placement on palliative dobutamine   -Not a candidate for advance therapies   -recent admission for ADHF in December  -On Bumex and Metolazone, discussed the importance of taking this diuretics   - Educated on fluid intake monitoring for heart failure management.  - Continued diuretics  -d/c codeine for cough    Anxiety  -- Increased Zoloft to 100 mg daily for anxiety management.  - sertraline (ZOLOFT) 100 MG tablet; Take 1 tablet (100 mg total) by mouth once daily.  Dispense: 30 tablet; Refill: 11     Dyspnea, unspecified type  - fentaNYL (DURAGESIC) 12 mcg/hr PT72; Place 1 patch onto the skin every 72 hours.  Dispense: 5 patch; Refill: 0  - Initiated low-dose transdermal fentanyl patch 12mcg for chronic pain control while avoiding opioid accumulation due to renal impairment. Plan to start on Sunday   - Contact office if any adverse reaction to fentanyl patch.  - Continued Norco as needed for breakthrough pain/dyspnea.  - Discussed physiological effects of opioid analgesics and need for bowel regimen.  - Referred to pain management for knee pain evaluation and possible injection.      Constipation, unspecified constipation type  - polyethylene glycol (MIRALAX) 17 gram/dose powder; Take 17 g by mouth once daily.  Dispense: 510 g; Refill: 0  -Linzess  - Continued MiraLax for constipation prophylaxis with initiation of fentanyl., increase dose to 3 times daily if needed.  - Discontinued Ozempic  to promote  bowel motility.      Pain   Gout  Leg Cramps  Bilateral lower extremity, sharp burning and tingling pain  -decrease Lyrica to 50 mg q.h.s. (renally dosed)  -Norco 10-325mg q 6H PRN  - Continued Norco as needed for breakthrough pain/dyspnea.  --Gout: Crystal proven on arthrocentesis from 5/2023 on Allopurinol 300mg daily and prednisone for pain  - Discussed the patient's leg pain and its possible relation to the intake of metolazone and Bumex, use heat and muscle relaxant for cramps if needed  magnesium glycinate and to take it at night   theraworx spray     Nausea  -  ondansetron (ZOFRAN-ODT) 8 MG  every 12 (twelve) hours PRN    Stage 5 chronic kidney disease  -Managed by nephrology  -this is a determinant of management of her gout and pain  -renally dosing meds      Palliative care encounter  Medicolegal: Has decision making capacity. Named her daughter Erika Estrada is HCPOA.     Goals of care:    ACP Date: 07/11/2023  I engaged the patient and   in a voluntary conversation about advance care planning and we specifically addressed what the goals of care would be moving forward, in light of the patient's change in clinical status, specifically worsening heart failure with multiple admissions.  We did specifically address the patient's likely prognosis, which is poor.  I shared that in the best case scenario her prognosis might be month and that given the nature of her heart condition she can die any time. I did explain the role for hospice care at this stage of the patient's illness, including its ability to help the patient live with the best quality of life possible.  We explored the patient's values and preferences for future care.  The patient endorses that what is most important right now is to focus on curative/life-prolongation (regardless of treatment burdens),.  She states that she is doing everything in her power to be evaluated at [facility] because she is hoping to obtain a heart  transplant.  Her  encouraged her to do everything as fast as possible so that she can go to [place] soon. Accordingly, we have decided that the best plan to meet the patient's goals includes continuing with treatment      ACP Date: 05/24/2023  I engaged the patient in a conversation about advance care planning and we specifically addressed what the goals of care would be moving forward, in light of the patient's change in clinical status, specifically now on palliative dobutamine.  We did specifically address the patient's likely prognosis, which is poor.  We discussed that dobutamine is mainly to help her feel better and will not change her heart condition. We explored the patient's values and preferences for future care.  The patient endorses that what is most important right now is to focus on remaining as independent as possible, symptom/pain control, and curative/life-prolongation (regardless of treatment burdens).  She shared that she is hoping for the better that she is hoping to get better.  She hopes that her heart will get better, she shared that she knows that she has been told in the past that it will not improve.  She shared that she is hoping for a miracle to happen so that her heart improves.  She is also hoping to be able to spend more time with family to be part of the activities to go out and enjoy eating with them.  Accordingly, we have decided that the best plan to meet the patient's goals includes continuing with treatment      ACP Date: 02/27/2023  I engaged the patient in a conversation about advance care planning.  We discussed HCPOA, she wanted to change her current HCPOA and name her daughter Erika Estrada as her agent and her sister and son as backups. We specifically addressed what the goals of care would be moving forward, in light of the patient's change in clinical status, specifically transplant workup.  We did specifically address the patient's likely prognosis, which is  poor w/o a trasnplant.  We explored the patient's values and preferences for future care.  The patient endorses that what is most important right now is to focus on curative/life-prolongation (regardless of treatment burdens)  Accordingly, we have decided that the best plan to meet the patient's goals includes continuing with treatment      Code status:  Full code    Advance directives:HCPOA on file       min time was spent on advance care planning, goals of care discussion, emotional support, formulating and communicating prognosis and goals of care, exploring burden/benefit of various approaches of treatment.          Follow up: 4m         SUBJECTIVE:     History obtained from: patient     complaint:   Chief Complaint   Patient presents with    Nausea    Anorexia    Fatigue    Insomnia       History of Present Illness / Interval History:  Hafsa Hawley is 58 y.o. year old female presenting with heart failure .  Referred to Palliative Care for evaluation and management of physical symptoms. female attended the appointment alone       The patient fell two weeks ago, causing balance issues, occasional dizziness when moving too quickly, and lightheadedness when standing up, which she attributes to a recent cold. The fall aggravated a pre-existing knee injury, resulting in ongoing pain and popping despite reduced swelling. She has been using Voltaren gel and a wrap for pain relief, but the pain persists. Dr. Michel administered a shot for the knee issue about a month and a half ago, providing temporary relief. The patient also experiences cramping and curling of her foot, feeling like a vein or something going into it, causing the foot to stay in an upward position until it subsides. She manages these symptoms with magnesium prescribed by Dr. Michel and a topical therapy spray. The patient is hesitant to go on vacation due to concerns about her health during long car rides. She experiences shortness of breath,  using a nebulizer 2-3 times daily and cough syrup for phlegm and a persistent cough. Her voice is intermittently hoarse, and she sometimes finds it difficult to speak for extended periods. The patient reports a lack of appetite and difficulty eating, particularly with meat and certain vegetables, causing nausea and a feeling of the food coming back up. She finds relief in consuming ice chips, popsicles, and other cold, soothing items. The patient also mentions hair loss and a general lack of energy to maintain her appearance. The patient expresses anxiety about her health, which she believes may be contributing to her sleep difficulties. She takes Zoloft and Lyrica for her mental health and pain management. The patient denies experiencing chest pain or fever in relation to her respiratory symptoms. She has not been diagnosed with glaucoma, despite some eye-related concerns.  Patient fell two weeks ago and has been feeling off-balance since then, especially when moving too fast. She feels dizzy and lightheaded when standing up. Patient's knee is still bothering her a lot after the fall. It pops and hurts badly, although the swelling has improved. Patient is scared to go on vacation due to concerns about riding long distances with her medical conditions. She worries something might happen during a trip lasting over 2-2.5 hours, although she has all her medical supplies like extra pumps, batteries, nebulizer, and solutions. Patient's hair is falling out and she has not been maintaining it well, just putting it up in a loop. She had her granddaughter comb it a bit before the appointment. Patient is aggravated by having to keep a notepad to track diabetes, blood pressure, weight, and other health metrics. Patient reports mostly being constipated, having to strain and bleed due to hard stool. Sometimes she has to manually disimpact herself. She has tried MiraLAX, Metamucil, and other laxatives to manage this. Patient has  difficulty sleeping, feeling she can't fall asleep or wake up, and attributes this to anxiety wearing her out. Patient reports enjoying her days with her grandkids, especially the little baby, who keeps her on her toes.      ---------------    Since last visit ED visits admissions 6/27, 6/5, 5/5, 4/8    Recent echo 5/5/24- EF 10-15%  ------------------    The patient reports a recent fall on both sides, resulting in a broken rib and knee pain. She has been taking pain medications as needed. She describes a pain in the leg that feels like a cramp, lasting up to 20 minutes, and occurs mostly during sleep. She has been taking Bumex and metolazone for fluid management, but has not taken metolazone for a week. She experiences frequent urination, sometimes involuntarily, and has been wearing pads for protection. She has been spending time with her grandchildren and engaging in activities such as walking and gardening. She has had stress due to a family situation involving her son. She denies any recent changes in medication or any significant improvement in leg pain.    -------    Lower extremity pain has been well-controlled on prednisone 5 mg.  She is feeling better.  Minimal shortness of breath might be sick with a cold.  She continues to have significant anxiety.  Has significant nausea and constipation.  Her weight is up and down however she her dry weight is around 179 lb.  Unable to sleep well at night.    --------    Recent ED visits due to lower extremity pain  Continues to have pain in her left food and right knee.  Describes a sharp pain and she is taking her pain medication which helps with her pain but does not completely resolve it.  She takes Tylenol as well.  Describes her lower extremity pain as burning and tingling and sometimes sharp  Worsening anxiety everyday all day.  Taking Zofran daily  Not constipated she is taking Dulcolax  Not on Ozempic for now because of a high co-pay  She is stopped  Lexapro    ---------    [facility] admission for ADHF 6/27  CKD plus [place] from cardiorenal syndrome and ADCHF. She now is close to euvolemic. On  dobutamine 5mcg/kg/min and Bumex   She reports nausea occasionally, having daily bowel movements.  Has cough all day however she does not feel swollen.  He feels her legs are weaker and now stairs her becoming difficult to navigate.    ----------  She states that she has not been feeling good.  She has abdominal pain significant constipation.  She also describes nausea and bloating.  She shared that her medications are constantly changing so it is difficult for her to keep track of those changes.  She is compliant with medications she is drinking some water because she feels very dry.  She has an appointment with Cardiology on the 17th    -----    She reports that she is feeling okay now that she is on the dobutamine pump.  She feels it has made a difference in her energy level and that she is not eating too much oxygen anymore.  She continues to hope for improvement.  However she is unable to do the things that she enjoys like spending time with her grandkids and going out because sometimes she is not feeling well.  She feels fatigued she has a lot of pain in her knees and her hips and legs.  She is unable to walk at times.  She also endorsed decreased appetite and significant nausea that improves with Zofran however she needs to take 8 mg for it to work.         Disease History:  NICM since 2013 HFrEF (EF 10%) s/p AICD placement 3/9/22 declined for OHT or LVAD due to renal function, lung function   pulmonary hypertension  chronic hypoxic and hypercapnic respiratory failure   Paroxysmal A Fib on Eliquis, Pulmonary Hypertension  Hypertension, Type 2 Diabetes Mellitus           External  database queried on 3/26/24 by Sandra Hernandes .   The results reviewed and considered with the clinical data in the decision whether or not to prescribe a controlled  substance.    03/18/2024 02/15/2024 3 Alprazolam 2 Mg Tablet 60.00 30 Ch Par [id number] Och (1974) 1 8.00 LME Comm [place]   03/07/2024 03/06/2024 3 Pregabalin 100 Mg Capsule 30.00 30 Mi Bac [id number] Och (1974) 0 0.67 [place]   02/28/2024 02/28/2024 3 Hydrocodone-Acetamin  Mg 120.00 30 Er [place] [id number] O[facility] (1974) 0 40.00 [place]   02/21/2024 02/20/2024 3 Promethazine-Codeine Solution 473.00 24 Ch Par [id number] O[facility] (1974) 0 5.91 [place]   02/15/2024 02/15/2024 3 Alprazolam 2 Mg Tablet 60.00 30 Ch Par [id number] [facility] (1974) 0 8.00 [place]       Medications:    Current Outpatient Medications:     acetaminophen (TYLENOL) 650 MG TbSR, Take 1 tablet (650 mg total) by mouth every 8 (eight) hours., Disp: 21 tablet, Rfl: 0    albuterol-ipratropium (DUO-NEB) 2.5 mg-0.5 mg/3 mL nebulizer solution, Take 3 mLs by nebulization every 6 (six) hours as needed for Wheezing or Shortness of Breath., Disp: 90 mL, Rfl: 2    allopurinoL (ZYLOPRIM) 300 MG tablet, Take 1 tablet (300 mg total) by mouth once daily., Disp: 90 tablet, Rfl: 1    ALPRAZolam (XANAX) 2 MG Tab, Take 1 tablet orally 2 times a day., Disp: 60 tablet, Rfl: 4    amiodarone (PACERONE) 200 MG Tab, take one tablet by mouth twice a day, Disp: 60 tablet, Rfl: 4    apixaban (ELIQUIS) 2.5 mg Tab, Take 1 tablet (2.5 mg total) by mouth 2 (two) times daily., Disp: 60 tablet, Rfl: 12    aspirin (ECOTRIN) 81 MG EC tablet, Take 81 mg by mouth once daily., Disp: , Rfl:     atorvastatin (LIPITOR) 40 MG tablet, Take 1 tablet (40 mg total) by mouth every evening., Disp: 90 tablet, Rfl: 3    benzonatate (TESSALON) 100 MG capsule, Take 1 capsule (100 mg total) by mouth 3 (three) times daily as needed for Cough., Disp: 60 capsule, Rfl: 2    blood-glucose sensor (DEXCOM G7 SENSOR) Evon, change sensor every 10 days., Disp: 3 each, Rfl: 11    bumetanide (BUMEX) 2 MG tablet, Take 2 tablets (4mg total) by mouth in morning and take 1 tablet (2mg total)  by mouth in the evening (no later then 5pm)., Disp: 90 tablet, Rfl: 5    cetirizine (ZYRTEC) 10 MG tablet, Take 1 tablet (10 mg total) by mouth once daily., Disp: 30 tablet, Rfl: 12    dicyclomine (BENTYL) 20 mg tablet, Take 1 tablet (20 mg total) by mouth 3 (three) times daily as needed (abd pain)., Disp: 30 tablet, Rfl: 1    DOBUTamine (DOBUTREX) 1,000 mg/250 mL (4,000 mcg/mL) infusion, Inject 409 mcg/min into the vein continuous., Disp: , Rfl:     empagliflozin (JARDIANCE) 25 mg tablet, Take 1 tablet orally once a day., Disp: 30 tablet, Rfl: 12    ergocalciferol (VITAMIN D2) 50,000 unit Cap, Take 1 capsule orally once a  week, Disp: 4 capsule, Rfl: 11    fluticasone propionate (FLONASE ALLERGY RELIEF) 50 mcg/actuation nasal spray, Use 2 sprays (100 mcg total) by Each Nostril route once daily., Disp: 16 g, Rfl: 12    glimepiride (AMARYL) 1 MG tablet, Take 1 tablet (1 mg total) by mouth before breakfast., Disp: 90 tablet, Rfl: 3    HYDROcodone-acetaminophen (NORCO)  mg per tablet, Take 1 tablet by mouth every 6 (six) hours as needed for Pain., Disp: 120 tablet, Rfl: 0    isosorbide-hydrALAZINE 20-37.5 mg (BIDIL) 20-37.5 mg Tab, Take 1 tablet by mouth 3 (three) times daily., Disp: 90 tablet, Rfl: 3    LIDOcaine (LIDODERM) 5 %, Place 1 patch onto the skin once daily. Remove & discard patch within 12 hours or as directed by MD., Disp: 30 patch, Rfl: 1    magnesium oxide (MAG-OX) 400 mg (241.3 mg magnesium) tablet, Take 1 tablet (400 mg total) by mouth every evening., Disp: 30 tablet, Rfl: 5    metOLazone (ZAROXOLYN) 5 MG tablet, Take 1 tablet orally once a day., Disp: 90 tablet, Rfl: 4    nitroGLYCERIN (NITROSTAT) 0.4 MG SL tablet, Place 1 tablet (0.4 mg total) under the tongue every 5 (five) minutes as needed for Chest pain. use up to three doses. if pain doesnt resolve, call 911, Disp: 25 tablet, Rfl: 2    ondansetron (ZOFRAN-ODT) 8 MG TbDL, Dissolve 1 tablet (8 mg total) by mouth every 8 (eight) hours as  "needed (nausea)., Disp: 30 tablet, Rfl: 4    pantoprazole (PROTONIX) 40 MG tablet, Take 1 tablet (40 mg total) by mouth once daily., Disp: 90 tablet, Rfl: 3    semaglutide (OZEMPIC) 1 mg/dose (4 mg/3 mL), Inject 1 mg into the skin every 7 days., Disp: 3 mL, Rfl: 11    SPIRIVA WITH HANDIHALER 18 mcg inhalation capsule, Inhale 1 capsule (18 mcg total) into the lungs once daily., Disp: 30 capsule, Rfl: 5    VENTOLIN HFA 90 mcg/actuation inhaler, Inhale 2 puffs into the lungs every 6 (six) hours as needed for Shortness of Breath or Wheezing., Disp: 18 g, Rfl: 11    fentaNYL (DURAGESIC) 12 mcg/hr PT72, Place 1 patch onto the skin every 72 hours., Disp: 5 patch, Rfl: 0    polyethylene glycol (MIRALAX) 17 gram/dose powder, Take 17 g by mouth once daily., Disp: 510 g, Rfl: 0    pregabalin (LYRICA) 100 MG capsule, Take 1 capsule (100 mg total) by mouth every evening., Disp: 30 capsule, Rfl: 2    sertraline (ZOLOFT) 100 MG tablet, Take 1 tablet (100 mg total) by mouth once daily., Disp: 30 tablet, Rfl: 11  No current facility-administered medications for this visit.    Facility-Administered Medications Ordered in Other Visits:     0.9%  NaCl infusion, , Intravenous, Continuous, Corinna Hayes NP, New Bag at 05/23/19 0721      Review of patient's allergies indicates:   Allergen Reactions    Penicillins Hives and Other (See Comments)    Iodinated contrast media Nausea And Vomiting    Oxycodone-acetaminophen Other (See Comments)     Nausea, Dizziness, Anxiety.  "I don't like how it makes me feel."   Given Hydromorphone 0.5mg IVP  Without problems.  Other reaction(s): Other (See Comments)    Clonazepam Other (See Comments)    Diovan hct [valsartan-hydrochlorothiazide] Other (See Comments)     Causes coughing    Iodine Other (See Comments)    Irinotecan      Pt has homozygosity for the TA7 promoter variant that places this individual at significantly increased risk for   severe neutropenia (grade 4) when treated with the " "standard dose of irinotecan (risk approximately 50%).   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 Metabolism of other drugs not listed here may also be impacted by UGT1A1 enzyme activity.       Tramadol Nausea And Vomiting and Other (See Comments)     Other reaction(s): Other (See Comments)    Valsartan Other (See Comments)       OBJECTIVE:       Review of Symptoms      Symptom Assessment (ESAS 0-10 Scale)  Pain:  5  Dyspnea:  7  Anxiety:  8  Nausea:  10  Depression:  4  Anorexia:  10  Fatigue:  10  Insomnia:  10  Restlessness:  0  Agitation:  0     CAM / Delirium:  Negative  Constipation:  Negative  Diarrhea:  Negative    Anxiety:  Is not nervous/anxious  Constipation:  Constipation    Comments:  Miralax    Pain Assessment:    Location(s): none      Performance Status:  60    Living Arrangements:  Lives with family    Psychosocial/Cultural:   See Palliative Psychosocial Note: Yes  Lives with  and granddaughter. Pt lives in a mobile home with 5 NIRAJ. Pt also has support from her two sisters Benoit Baum [phone], Jeanie Jaimes [phone]. Pt is mostly independent with ADL's, but has a RW, SC and home oxygen, which she states she uses "as needed".   dgtr Erika Estrada, 33, [place], son Miguel Baum, 35  **Primary  to Follow**  Palliative Care  Consult: Yes    Spiritual:  F - Damaris and Belief:  Rastafari       Advance Care Planning   Advance Directives:   Living Will: No    LaPOST: No    Do Not Resuscitate Status: No    Medical Power of : Yes    Agent's Name:  Erika Estrada    Decision Making:  Patient answered questions  Goals of Care: What is most important right now is to focus on curative/life-prolongation (regardless of treatment burdens), remaining as independent as possible, symptom/pain control. Accordingly, we have decided that the best plan to " meet the patient's goals includes continuing with treatment.    She enjoys spending time with her grandkids and going fishing. Her goal is to maintain her health and continue doing activities she enjoys.              Physical Exam:  Vitals: Pulse: 108 (07/05/24 1403)  BP: 123/82 (07/05/24 1403)  SpO2: 96 % (07/05/24 1403)  Physical Exam  Constitutional:       General: She is not in acute distress.  Pulmonary:      Effort: Pulmonary effort is normal. No respiratory distress.   Musculoskeletal:      Cervical back: Neck supple.   Neurological:      Mental Status: She is alert and oriented to person, place, and time.   Psychiatric:         Mood and Affect: Mood and affect normal.         I spent a total of 44 minutes on the day of the visit. This includes face to face time in discussion of goals of care, symptom assessment, coordination of care and emotional support.  This also includes non-face to face time preparing to see the patient (eg, review of tests/imaging), obtaining and/or reviewing separately obtained history, documenting clinical information in the electronic or other health record, independently interpreting results and communicating results to the patient/family/caregiver, or care coordinator.         This note was generated with the assistance of ambient listening technology. Verbal consent was obtained by the patient and accompanying visitor(s) for the recording of patient appointment to facilitate this note. I attest to having reviewed and edited the generated note for accuracy, though some syntax or spelling errors may persist. Please contact the author of this note for any clarification.      Signature: Sandra Hernandes MD

## 2024-07-10 ENCOUNTER — INFUSION (OUTPATIENT)
Dept: INFUSION THERAPY | Facility: HOSPITAL | Age: 59
End: 2024-07-10
Attending: INTERNAL MEDICINE
Payer: MEDICAID

## 2024-07-10 VITALS
SYSTOLIC BLOOD PRESSURE: 120 MMHG | RESPIRATION RATE: 22 BRPM | TEMPERATURE: 97 F | HEART RATE: 108 BPM | DIASTOLIC BLOOD PRESSURE: 85 MMHG

## 2024-07-10 DIAGNOSIS — I42.9 CARDIOMYOPATHY, UNSPECIFIED TYPE: Primary | ICD-10-CM

## 2024-07-10 LAB
ALBUMIN SERPL BCP-MCNC: 3.9 G/DL (ref 3.5–5.2)
ALP SERPL-CCNC: 53 U/L (ref 55–135)
ALT SERPL W/O P-5'-P-CCNC: 13 U/L (ref 10–44)
ANION GAP SERPL CALC-SCNC: 12 MMOL/L (ref 8–16)
ANISOCYTOSIS BLD QL SMEAR: ABNORMAL
AST SERPL-CCNC: 19 U/L (ref 10–40)
BASOPHILS # BLD AUTO: 0.05 K/UL (ref 0–0.2)
BASOPHILS NFR BLD: 1.3 % (ref 0–1.9)
BILIRUB SERPL-MCNC: 1.9 MG/DL (ref 0.1–1)
BNP SERPL-MCNC: 2214 PG/ML (ref 0–99)
BUN SERPL-MCNC: 72 MG/DL (ref 6–20)
CALCIUM SERPL-MCNC: 9.9 MG/DL (ref 8.7–10.5)
CHLORIDE SERPL-SCNC: 101 MMOL/L (ref 95–110)
CO2 SERPL-SCNC: 28 MMOL/L (ref 23–29)
CREAT SERPL-MCNC: 3.5 MG/DL (ref 0.5–1.4)
DACRYOCYTES BLD QL SMEAR: ABNORMAL
DIFFERENTIAL METHOD BLD: ABNORMAL
EOSINOPHIL # BLD AUTO: 0.1 K/UL (ref 0–0.5)
EOSINOPHIL NFR BLD: 2.3 % (ref 0–8)
ERYTHROCYTE [DISTWIDTH] IN BLOOD BY AUTOMATED COUNT: 30.2 % (ref 11.5–14.5)
EST. GFR  (NO RACE VARIABLE): 15 ML/MIN/1.73 M^2
GLUCOSE SERPL-MCNC: 128 MG/DL (ref 70–110)
HCT VFR BLD AUTO: 37.1 % (ref 37–48.5)
HGB BLD-MCNC: 10.9 G/DL (ref 12–16)
HYPOCHROMIA BLD QL SMEAR: ABNORMAL
IMM GRANULOCYTES # BLD AUTO: 0.02 K/UL (ref 0–0.04)
IMM GRANULOCYTES NFR BLD AUTO: 0.5 % (ref 0–0.5)
LYMPHOCYTES # BLD AUTO: 0.7 K/UL (ref 1–4.8)
LYMPHOCYTES NFR BLD: 18.7 % (ref 18–48)
MAGNESIUM SERPL-MCNC: 2.1 MG/DL (ref 1.6–2.6)
MCH RBC QN AUTO: 19.3 PG (ref 27–31)
MCHC RBC AUTO-ENTMCNC: 29.4 G/DL (ref 32–36)
MCV RBC AUTO: 66 FL (ref 82–98)
MONOCYTES # BLD AUTO: 0.3 K/UL (ref 0.3–1)
MONOCYTES NFR BLD: 8.3 % (ref 4–15)
NEUTROPHILS # BLD AUTO: 2.7 K/UL (ref 1.8–7.7)
NEUTROPHILS NFR BLD: 68.9 % (ref 38–73)
NRBC BLD-RTO: 4 /100 WBC
OVALOCYTES BLD QL SMEAR: ABNORMAL
PHOSPHATE SERPL-MCNC: 4.1 MG/DL (ref 2.7–4.5)
PLATELET # BLD AUTO: 187 K/UL (ref 150–450)
PLATELET BLD QL SMEAR: ABNORMAL
PMV BLD AUTO: ABNORMAL FL (ref 9.2–12.9)
POIKILOCYTOSIS BLD QL SMEAR: ABNORMAL
POLYCHROMASIA BLD QL SMEAR: ABNORMAL
POTASSIUM SERPL-SCNC: 4.2 MMOL/L (ref 3.5–5.1)
PROT SERPL-MCNC: 6.5 G/DL (ref 6–8.4)
RBC # BLD AUTO: 5.66 M/UL (ref 4–5.4)
SCHISTOCYTES BLD QL SMEAR: ABNORMAL
SCHISTOCYTES BLD QL SMEAR: PRESENT
SODIUM SERPL-SCNC: 141 MMOL/L (ref 136–145)
SPHEROCYTES BLD QL SMEAR: ABNORMAL
TARGETS BLD QL SMEAR: ABNORMAL
WBC # BLD AUTO: 3.96 K/UL (ref 3.9–12.7)

## 2024-07-10 PROCEDURE — 36591 DRAW BLOOD OFF VENOUS DEVICE: CPT

## 2024-07-10 PROCEDURE — 80053 COMPREHEN METABOLIC PANEL: CPT | Performed by: STUDENT IN AN ORGANIZED HEALTH CARE EDUCATION/TRAINING PROGRAM

## 2024-07-10 PROCEDURE — 84100 ASSAY OF PHOSPHORUS: CPT | Performed by: STUDENT IN AN ORGANIZED HEALTH CARE EDUCATION/TRAINING PROGRAM

## 2024-07-10 PROCEDURE — 83880 ASSAY OF NATRIURETIC PEPTIDE: CPT | Performed by: STUDENT IN AN ORGANIZED HEALTH CARE EDUCATION/TRAINING PROGRAM

## 2024-07-10 PROCEDURE — 85025 COMPLETE CBC W/AUTO DIFF WBC: CPT | Performed by: STUDENT IN AN ORGANIZED HEALTH CARE EDUCATION/TRAINING PROGRAM

## 2024-07-10 PROCEDURE — 83735 ASSAY OF MAGNESIUM: CPT | Performed by: STUDENT IN AN ORGANIZED HEALTH CARE EDUCATION/TRAINING PROGRAM

## 2024-07-10 NOTE — PROGRESS NOTES
Blood drawn via PICC line.    Right upper arm PICC line dressing change done using sterile technique.  No signs of infection noted  Stat lock device  biopatch and tegaderm dressing applied.  Tolerated well  pt will flush with home heparin

## 2024-07-15 ENCOUNTER — INFUSION (OUTPATIENT)
Dept: INFUSION THERAPY | Facility: HOSPITAL | Age: 59
End: 2024-07-15
Attending: INTERNAL MEDICINE
Payer: MEDICAID

## 2024-07-15 VITALS
SYSTOLIC BLOOD PRESSURE: 101 MMHG | RESPIRATION RATE: 20 BRPM | DIASTOLIC BLOOD PRESSURE: 73 MMHG | HEART RATE: 96 BPM | TEMPERATURE: 97 F

## 2024-07-15 DIAGNOSIS — I42.9 CARDIOMYOPATHY, UNSPECIFIED TYPE: Primary | ICD-10-CM

## 2024-07-15 LAB
ALBUMIN SERPL BCP-MCNC: 3.7 G/DL (ref 3.5–5.2)
ALP SERPL-CCNC: 50 U/L (ref 55–135)
ALT SERPL W/O P-5'-P-CCNC: 17 U/L (ref 10–44)
ANION GAP SERPL CALC-SCNC: 14 MMOL/L (ref 8–16)
AST SERPL-CCNC: 18 U/L (ref 10–40)
BILIRUB SERPL-MCNC: 2.2 MG/DL (ref 0.1–1)
BUN SERPL-MCNC: 89 MG/DL (ref 6–20)
CALCIUM SERPL-MCNC: 9.7 MG/DL (ref 8.7–10.5)
CHLORIDE SERPL-SCNC: 100 MMOL/L (ref 95–110)
CO2 SERPL-SCNC: 25 MMOL/L (ref 23–29)
CREAT SERPL-MCNC: 4.5 MG/DL (ref 0.5–1.4)
EST. GFR  (NO RACE VARIABLE): 11 ML/MIN/1.73 M^2
GLUCOSE SERPL-MCNC: 147 MG/DL (ref 70–110)
MAGNESIUM SERPL-MCNC: 2.2 MG/DL (ref 1.6–2.6)
PHOSPHATE SERPL-MCNC: 4.6 MG/DL (ref 2.7–4.5)
POTASSIUM SERPL-SCNC: 5.2 MMOL/L (ref 3.5–5.1)
PROT SERPL-MCNC: 6.2 G/DL (ref 6–8.4)
SODIUM SERPL-SCNC: 139 MMOL/L (ref 136–145)

## 2024-07-15 PROCEDURE — 36415 COLL VENOUS BLD VENIPUNCTURE: CPT

## 2024-07-15 PROCEDURE — 36415 COLL VENOUS BLD VENIPUNCTURE: CPT | Performed by: STUDENT IN AN ORGANIZED HEALTH CARE EDUCATION/TRAINING PROGRAM

## 2024-07-15 PROCEDURE — 83735 ASSAY OF MAGNESIUM: CPT | Performed by: STUDENT IN AN ORGANIZED HEALTH CARE EDUCATION/TRAINING PROGRAM

## 2024-07-15 PROCEDURE — 84100 ASSAY OF PHOSPHORUS: CPT | Performed by: STUDENT IN AN ORGANIZED HEALTH CARE EDUCATION/TRAINING PROGRAM

## 2024-07-15 PROCEDURE — 80053 COMPREHEN METABOLIC PANEL: CPT | Performed by: STUDENT IN AN ORGANIZED HEALTH CARE EDUCATION/TRAINING PROGRAM

## 2024-07-15 NOTE — PROGRESS NOTES
Right upper arm PICC line dressing change done using sterile technique.  No signs of infection noted.  Bio patch, stat lock device and tegaderm dressing reapplied

## 2024-07-15 NOTE — PROGRESS NOTES
Unable to get blood from PICC line.  Flushed with saline  Pt will flush with home heparin  tony stick to L AC done

## 2024-07-17 ENCOUNTER — TELEPHONE (OUTPATIENT)
Dept: FAMILY MEDICINE | Facility: CLINIC | Age: 59
End: 2024-07-17
Payer: MEDICAID

## 2024-07-17 NOTE — TELEPHONE ENCOUNTER
----- Message from Frank Cook sent at 2024 11:35 AM CDT -----  Contact: Patient  Hafsa Hawley  MRN: [id number]  : 1965  PCP: Cristobal Ann  Home Phone      [phone]  Work Phone      Not on file.  Mobile          [phone]      MESSAGE: fell - bilateral knee pain, rediating down legs -- requesting appt today    Call [phone]    PCP: Seth

## 2024-07-17 NOTE — TELEPHONE ENCOUNTER
Spoke to pt and informed her we do not have any available appts today. Pt states she was in a lot of pain. Advised pt to go to urgent care or ER for evaluation. Verbalized understanding.

## 2024-07-22 ENCOUNTER — CLINICAL SUPPORT (OUTPATIENT)
Dept: CARDIOLOGY | Facility: HOSPITAL | Age: 59
End: 2024-07-22
Attending: INTERNAL MEDICINE
Payer: MEDICAID

## 2024-07-22 ENCOUNTER — HOSPITAL ENCOUNTER (EMERGENCY)
Facility: HOSPITAL | Age: 59
Discharge: ANOTHER HOSPITAL | End: 2024-07-24
Attending: SURGERY
Payer: MEDICAID

## 2024-07-22 DIAGNOSIS — I27.20 PULMONARY HYPERTENSION: ICD-10-CM

## 2024-07-22 DIAGNOSIS — I27.20 PULMONARY HTN: Chronic | ICD-10-CM

## 2024-07-22 DIAGNOSIS — F06.31 DEPRESSION DUE TO PHYSICAL ILLNESS: Chronic | ICD-10-CM

## 2024-07-22 DIAGNOSIS — I50.9 ACUTE DECOMPENSATED HEART FAILURE: ICD-10-CM

## 2024-07-22 DIAGNOSIS — I10 ESSENTIAL HYPERTENSION: ICD-10-CM

## 2024-07-22 DIAGNOSIS — N18.4 CKD (CHRONIC KIDNEY DISEASE) STAGE 4, GFR 15-29 ML/MIN: ICD-10-CM

## 2024-07-22 DIAGNOSIS — R06.02 SOB (SHORTNESS OF BREATH): ICD-10-CM

## 2024-07-22 DIAGNOSIS — I24.89 DEMAND ISCHEMIA: ICD-10-CM

## 2024-07-22 DIAGNOSIS — Z95.810 PRESENCE OF AUTOMATIC (IMPLANTABLE) CARDIAC DEFIBRILLATOR: ICD-10-CM

## 2024-07-22 DIAGNOSIS — R79.89 ELEVATED TROPONIN: Primary | ICD-10-CM

## 2024-07-22 DIAGNOSIS — I13.0 HYPERTENSIVE CARDIOVASCULAR-RENAL DISEASE, STAGE 1-4 OR UNSPECIFIED CHRONIC KIDNEY DISEASE, WITH HEART FAILURE: ICD-10-CM

## 2024-07-22 DIAGNOSIS — R06.02 SHORTNESS OF BREATH: ICD-10-CM

## 2024-07-22 DIAGNOSIS — I50.20 ACC/AHA STAGE D SYSTOLIC HEART FAILURE: ICD-10-CM

## 2024-07-22 DIAGNOSIS — N18.4 CKD (CHRONIC KIDNEY DISEASE), STAGE IV: Chronic | ICD-10-CM

## 2024-07-22 DIAGNOSIS — I42.8 NICM (NONISCHEMIC CARDIOMYOPATHY): Chronic | ICD-10-CM

## 2024-07-22 DIAGNOSIS — I50.42 CHRONIC COMBINED SYSTOLIC AND DIASTOLIC HEART FAILURE: Chronic | ICD-10-CM

## 2024-07-22 DIAGNOSIS — I27.21 PULMONARY ARTERIAL HYPERTENSION: ICD-10-CM

## 2024-07-22 DIAGNOSIS — I51.7 LEFT VENTRICULAR HYPERTROPHY: Chronic | ICD-10-CM

## 2024-07-22 DIAGNOSIS — J44.1 COPD EXACERBATION: ICD-10-CM

## 2024-07-22 LAB
ALBUMIN SERPL BCP-MCNC: 3.7 G/DL (ref 3.5–5.2)
ALP SERPL-CCNC: 51 U/L (ref 55–135)
ALT SERPL W/O P-5'-P-CCNC: 16 U/L (ref 10–44)
ANION GAP SERPL CALC-SCNC: 14 MMOL/L (ref 8–16)
ANISOCYTOSIS BLD QL SMEAR: ABNORMAL
AST SERPL-CCNC: 20 U/L (ref 10–40)
BASOPHILS # BLD AUTO: 0.04 K/UL (ref 0–0.2)
BASOPHILS NFR BLD: 0.7 % (ref 0–1.9)
BILIRUB SERPL-MCNC: 2.4 MG/DL (ref 0.1–1)
BNP SERPL-MCNC: 2754 PG/ML (ref 0–99)
BUN SERPL-MCNC: 77 MG/DL (ref 6–20)
CALCIUM SERPL-MCNC: 9.9 MG/DL (ref 8.7–10.5)
CHLORIDE SERPL-SCNC: 102 MMOL/L (ref 95–110)
CK SERPL-CCNC: 31 U/L (ref 20–180)
CO2 SERPL-SCNC: 24 MMOL/L (ref 23–29)
CREAT SERPL-MCNC: 2.7 MG/DL (ref 0.5–1.4)
DACRYOCYTES BLD QL SMEAR: ABNORMAL
DIFFERENTIAL METHOD BLD: ABNORMAL
EOSINOPHIL # BLD AUTO: 0.1 K/UL (ref 0–0.5)
EOSINOPHIL NFR BLD: 1.6 % (ref 0–8)
ERYTHROCYTE [DISTWIDTH] IN BLOOD BY AUTOMATED COUNT: 31.1 % (ref 11.5–14.5)
EST. GFR  (NO RACE VARIABLE): 20 ML/MIN/1.73 M^2
GIANT PLATELETS BLD QL SMEAR: PRESENT
GLUCOSE SERPL-MCNC: 134 MG/DL (ref 70–110)
HCT VFR BLD AUTO: 36.7 % (ref 37–48.5)
HGB BLD-MCNC: 11 G/DL (ref 12–16)
HYPOCHROMIA BLD QL SMEAR: ABNORMAL
IMM GRANULOCYTES # BLD AUTO: 0.01 K/UL (ref 0–0.04)
IMM GRANULOCYTES NFR BLD AUTO: 0.2 % (ref 0–0.5)
LYMPHOCYTES # BLD AUTO: 1.1 K/UL (ref 1–4.8)
LYMPHOCYTES NFR BLD: 19.8 % (ref 18–48)
MCH RBC QN AUTO: 19.6 PG (ref 27–31)
MCHC RBC AUTO-ENTMCNC: 30 G/DL (ref 32–36)
MCV RBC AUTO: 65 FL (ref 82–98)
MONOCYTES # BLD AUTO: 0.6 K/UL (ref 0.3–1)
MONOCYTES NFR BLD: 11.7 % (ref 4–15)
NEUTROPHILS # BLD AUTO: 3.6 K/UL (ref 1.8–7.7)
NEUTROPHILS NFR BLD: 66 % (ref 38–73)
NRBC BLD-RTO: 9 /100 WBC
OVALOCYTES BLD QL SMEAR: ABNORMAL
PLATELET # BLD AUTO: 180 K/UL (ref 150–450)
PLATELET BLD QL SMEAR: ABNORMAL
PMV BLD AUTO: ABNORMAL FL (ref 9.2–12.9)
POIKILOCYTOSIS BLD QL SMEAR: ABNORMAL
POLYCHROMASIA BLD QL SMEAR: ABNORMAL
POTASSIUM SERPL-SCNC: 5 MMOL/L (ref 3.5–5.1)
PROT SERPL-MCNC: 6.3 G/DL (ref 6–8.4)
RBC # BLD AUTO: 5.62 M/UL (ref 4–5.4)
SCHISTOCYTES BLD QL SMEAR: ABNORMAL
SCHISTOCYTES BLD QL SMEAR: PRESENT
SODIUM SERPL-SCNC: 140 MMOL/L (ref 136–145)
SPHEROCYTES BLD QL SMEAR: ABNORMAL
TARGETS BLD QL SMEAR: ABNORMAL
TROPONIN I SERPL DL<=0.01 NG/ML-MCNC: 1.89 NG/ML (ref 0–0.03)
WBC # BLD AUTO: 5.46 K/UL (ref 3.9–12.7)

## 2024-07-22 PROCEDURE — 83880 ASSAY OF NATRIURETIC PEPTIDE: CPT | Performed by: SURGERY

## 2024-07-22 PROCEDURE — 96374 THER/PROPH/DIAG INJ IV PUSH: CPT

## 2024-07-22 PROCEDURE — 99900035 HC TECH TIME PER 15 MIN (STAT)

## 2024-07-22 PROCEDURE — 84484 ASSAY OF TROPONIN QUANT: CPT | Performed by: SURGERY

## 2024-07-22 PROCEDURE — 80053 COMPREHEN METABOLIC PANEL: CPT | Performed by: SURGERY

## 2024-07-22 PROCEDURE — 99291 CRITICAL CARE FIRST HOUR: CPT

## 2024-07-22 PROCEDURE — 93295 DEV INTERROG REMOTE 1/2/MLT: CPT | Mod: ,,, | Performed by: INTERNAL MEDICINE

## 2024-07-22 PROCEDURE — 85025 COMPLETE CBC W/AUTO DIFF WBC: CPT | Performed by: SURGERY

## 2024-07-22 PROCEDURE — 82550 ASSAY OF CK (CPK): CPT | Performed by: SURGERY

## 2024-07-22 PROCEDURE — 93005 ELECTROCARDIOGRAM TRACING: CPT

## 2024-07-22 PROCEDURE — 93010 ELECTROCARDIOGRAM REPORT: CPT | Mod: ,,, | Performed by: INTERNAL MEDICINE

## 2024-07-22 PROCEDURE — 93296 REM INTERROG EVL PM/IDS: CPT | Performed by: INTERNAL MEDICINE

## 2024-07-22 PROCEDURE — 63600175 PHARM REV CODE 636 W HCPCS: Performed by: SURGERY

## 2024-07-22 RX ORDER — FUROSEMIDE 10 MG/ML
40 INJECTION INTRAMUSCULAR; INTRAVENOUS
Status: COMPLETED | OUTPATIENT
Start: 2024-07-22 | End: 2024-07-22

## 2024-07-22 RX ADMIN — FUROSEMIDE 40 MG: 10 INJECTION, SOLUTION INTRAMUSCULAR; INTRAVENOUS at 11:07

## 2024-07-23 VITALS
TEMPERATURE: 98 F | SYSTOLIC BLOOD PRESSURE: 97 MMHG | HEART RATE: 99 BPM | BODY MASS INDEX: 27.92 KG/M2 | WEIGHT: 173 LBS | RESPIRATION RATE: 16 BRPM | DIASTOLIC BLOOD PRESSURE: 64 MMHG | OXYGEN SATURATION: 99 %

## 2024-07-23 LAB
OHS QRS DURATION: 104 MS
OHS QTC CALCULATION: 579 MS
TROPONIN I SERPL DL<=0.01 NG/ML-MCNC: 1.72 NG/ML (ref 0–0.03)
TROPONIN I SERPL DL<=0.01 NG/ML-MCNC: 1.74 NG/ML (ref 0–0.03)
TROPONIN I SERPL DL<=0.01 NG/ML-MCNC: 1.75 NG/ML (ref 0–0.03)

## 2024-07-23 PROCEDURE — 99900035 HC TECH TIME PER 15 MIN (STAT)

## 2024-07-23 PROCEDURE — 84484 ASSAY OF TROPONIN QUANT: CPT | Performed by: SURGERY

## 2024-07-23 PROCEDURE — 27000221 HC OXYGEN, UP TO 24 HOURS

## 2024-07-23 PROCEDURE — 96376 TX/PRO/DX INJ SAME DRUG ADON: CPT

## 2024-07-23 PROCEDURE — 25000003 PHARM REV CODE 250: Performed by: SURGERY

## 2024-07-23 PROCEDURE — 96374 THER/PROPH/DIAG INJ IV PUSH: CPT | Mod: 59

## 2024-07-23 PROCEDURE — 94760 N-INVAS EAR/PLS OXIMETRY 1: CPT

## 2024-07-23 PROCEDURE — 94640 AIRWAY INHALATION TREATMENT: CPT

## 2024-07-23 PROCEDURE — 96375 TX/PRO/DX INJ NEW DRUG ADDON: CPT

## 2024-07-23 PROCEDURE — 94761 N-INVAS EAR/PLS OXIMETRY MLT: CPT

## 2024-07-23 PROCEDURE — 63600175 PHARM REV CODE 636 W HCPCS: Performed by: FAMILY MEDICINE

## 2024-07-23 PROCEDURE — 25000242 PHARM REV CODE 250 ALT 637 W/ HCPCS: Performed by: SURGERY

## 2024-07-23 PROCEDURE — 84484 ASSAY OF TROPONIN QUANT: CPT | Mod: 91 | Performed by: FAMILY MEDICINE

## 2024-07-23 PROCEDURE — 63600175 PHARM REV CODE 636 W HCPCS: Performed by: SURGERY

## 2024-07-23 PROCEDURE — 94640 AIRWAY INHALATION TREATMENT: CPT | Mod: XB

## 2024-07-23 RX ORDER — LEVALBUTEROL 1.25 MG/.5ML
1.25 SOLUTION, CONCENTRATE RESPIRATORY (INHALATION) EVERY 6 HOURS PRN
Status: DISCONTINUED | OUTPATIENT
Start: 2024-07-23 | End: 2024-07-24 | Stop reason: HOSPADM

## 2024-07-23 RX ORDER — ATORVASTATIN CALCIUM 40 MG/1
40 TABLET, FILM COATED ORAL DAILY
Status: DISCONTINUED | OUTPATIENT
Start: 2024-07-23 | End: 2024-07-24 | Stop reason: HOSPADM

## 2024-07-23 RX ORDER — ONDANSETRON HYDROCHLORIDE 2 MG/ML
4 INJECTION, SOLUTION INTRAVENOUS
Status: COMPLETED | OUTPATIENT
Start: 2024-07-23 | End: 2024-07-23

## 2024-07-23 RX ORDER — LEVALBUTEROL 1.25 MG/.5ML
1.25 SOLUTION, CONCENTRATE RESPIRATORY (INHALATION)
Status: COMPLETED | OUTPATIENT
Start: 2024-07-23 | End: 2024-07-23

## 2024-07-23 RX ORDER — HEPARIN 100 UNIT/ML
5 SYRINGE INTRAVENOUS
Status: COMPLETED | OUTPATIENT
Start: 2024-07-23 | End: 2024-07-23

## 2024-07-23 RX ORDER — FUROSEMIDE 10 MG/ML
40 INJECTION INTRAMUSCULAR; INTRAVENOUS
Status: COMPLETED | OUTPATIENT
Start: 2024-07-23 | End: 2024-07-23

## 2024-07-23 RX ORDER — PREGABALIN 50 MG/1
50 CAPSULE ORAL 2 TIMES DAILY
Status: DISCONTINUED | OUTPATIENT
Start: 2024-07-23 | End: 2024-07-24 | Stop reason: HOSPADM

## 2024-07-23 RX ADMIN — ONDANSETRON 4 MG: 2 INJECTION INTRAMUSCULAR; INTRAVENOUS at 08:07

## 2024-07-23 RX ADMIN — HEPARIN 500 UNITS: 100 SYRINGE at 08:07

## 2024-07-23 RX ADMIN — LEVALBUTEROL 1.25 MG: 1.25 SOLUTION, CONCENTRATE RESPIRATORY (INHALATION) at 03:07

## 2024-07-23 RX ADMIN — ATORVASTATIN CALCIUM 40 MG: 40 TABLET, FILM COATED ORAL at 06:07

## 2024-07-23 RX ADMIN — APIXABAN 2.5 MG: 2.5 TABLET, FILM COATED ORAL at 06:07

## 2024-07-23 RX ADMIN — FUROSEMIDE 40 MG: 10 INJECTION, SOLUTION INTRAMUSCULAR; INTRAVENOUS at 01:07

## 2024-07-23 RX ADMIN — LEVALBUTEROL 1.25 MG: 1.25 SOLUTION, CONCENTRATE RESPIRATORY (INHALATION) at 01:07

## 2024-07-23 RX ADMIN — PREGABALIN 50 MG: 50 CAPSULE ORAL at 06:07

## 2024-07-23 NOTE — ED PROVIDER NOTES
"Encounter Date: 7/22/2024       History     Chief Complaint   Patient presents with    Dizziness     History of Present Illness  Hafsa Hawley is a 58 y.o. female that presents with shortness of breath  Patient has a end-stage pulmonary hypertension as well as CHF on review  Patient was on a palliative dobutamine drip, longstanding comorbidities/DX  Chest tightness & shortness of breath with no signs of distress on ER triage  Patient states that they have recently reduced dobutamine due to weight loss  Patient has had chest pressure last 1 to 2 days, dizzy, not feeling well tonight    The history is provided by the patient.     Review of patient's allergies indicates:   Allergen Reactions    Penicillins Hives and Other (See Comments)    Iodinated contrast media Nausea And Vomiting    Oxycodone-acetaminophen Other (See Comments)     Nausea, Dizziness, Anxiety.  "I don't like how it makes me feel."   Given Hydromorphone 0.5mg IVP  Without problems.  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 Metabolism of other drugs not listed here may also be impacted by UGT1A1 enzyme activity.       Tramadol Nausea And Vomiting and Other (See Comments)     Other reaction(s): Other (See Comments)    Valsartan Other (See Comments)     Past Medical History:   Diagnosis Date    Allergy     Anemia     Arthritis     Atrial fibrillation     OAC    BMI 32.0-32.9,adult 02/22/2023    Chronic " respiratory failure with hypoxia, on home oxygen therapy     2L with activity, off at rest.  Per Pulm  no overt evidence of ILD or COPD on PFTs and CT to explain O2 needs.    CKD (chronic kidney disease), stage IV 05/08/2018    Congestive heart failure     s/p AICD placement,    Deep vein thrombosis     Depression     elevated bilirubin d/t Gilbert's syndrome     confirmed by [place] genetic testing, evaluated by hepatology    Encounter for blood transfusion     GERD (gastroesophageal reflux disease)     Hypertension     Pheochromocytoma, malignant     Right kidney mass     Sleep apnea     Thalassemia trait, alpha     Thyroid disease     Type 2 diabetes mellitus with hyperglycemia, without long-term current use of insulin 08/13/2020     Past Surgical History:   Procedure Laterality Date    ANGIOGRAM, ABDOMINAL AORTA Right 04/15/2024    APPENDECTOMY      BONE MARROW BIOPSY      CARDIAC DEFIBRILLATOR PLACEMENT Left 12/2016    CARDIAC ELECTROPHYSIOLOGY MAPPING AND ABLATION      CARDIAC ELECTROPHYSIOLOGY MAPPING AND ABLATION      COLONOSCOPY N/A 05/06/2022    Procedure: COLONOSCOPY;  Surgeon: Arely Betancourt MD;  Location: [facility] ([address] [place] [address]);  Service: Endoscopy;  Laterality: N/A;  heart transplant candidate/ EF 25% - 2nd floor/ defib - Biotronik - ERW  Eliquis - per Dr. Cortez with [facility], Pt ok to hold Eliquis x 2 days prior-see media tab-outside correspondence dated 12/30/21  - ER[facility]  verbal instructions/portal instructions/email instructions - s    EYE SURGERY      due to running tears    FRACTURE SURGERY Left     hand 5th digit    HYSTERECTOMY      KNEE SURGERY Left 2016    hematoma    LIVER BIOPSY  10/24/2018    Minimal steatosis, predominantly macrovesicular, 1%, Minimal nonspecific portal inflammation, no fibrosis. No findings on biopsy to explain elevated bilirubin levels. Could be d/t Gilbert's =?- hemolysis    RIGHT HEART CATHETERIZATION Right 12/07/2021    Procedure: INSERTION, CATHETER, RIGHT HEART;   Surgeon: Irving Cardenas MD;  Location: [facility] CATH LAB;  Service: Cardiology;  Laterality: Right;    RIGHT HEART CATHETERIZATION Right 2022    Procedure: INSERTION, CATHETER, RIGHT HEART;  Surgeon: Burke Camilo MD;  Location: [facility] CATH LAB;  Service: Cardiology;  Laterality: Right;    RIGHT HEART CATHETERIZATION Right 2023    Procedure: INSERTION, CATHETER, RIGHT HEART;  Surgeon: Katie Liriano DO;  Location: [facility] CATH LAB;  Service: Cardiology;  Laterality: Right;    TRANSJUGULAR BIOPSY OF LIVER N/A 10/24/2018    Procedure: BIOPSY, LIVER, TRANSJUGULAR APPROACH;  Surgeon: Carmen Diagnostic Provider;  Location: [facility] OR [address] [place] [address];  Service: Radiology;  Laterality: N/A;     Family History   Problem Relation Name Age of Onset    Cancer Mother          pancreatic CA early 50's    Heart disease Father           MI in late 50's    Hypertension Father      Heart attack Father      Heart disease Sister      Heart disease Brother      Cirrhosis Brother          alcoholic    Heart disease Sister      Heart disease Brother      Hypertension Brother      Diabetes Brother       Social History     Tobacco Use    Smoking status: Never    Smokeless tobacco: Never   Substance Use Topics    Alcohol use: Not Currently     Comment: up to 1 yr ago drank 2-3 drinks on occasion but sporadic    Drug use: No     Review of Systems   Constitutional:  Positive for fatigue.   HENT: Negative.     Eyes: Negative.    Respiratory:  Positive for chest tightness and shortness of breath.    Cardiovascular:  Positive for chest pain.   Gastrointestinal: Negative.    Genitourinary: Negative.    Musculoskeletal: Negative.    Skin: Negative.    Neurological:  Positive for dizziness and weakness.   Psychiatric/Behavioral: Negative.         Physical Exam     Initial Vitals [24 2142]   BP Pulse Resp Temp SpO2   112/71 107 18 97.5 °F (36.4 °C) 96 %      MAP       --         Physical Exam    Nursing note and vitals  reviewed.  Constitutional: Vital signs are normal. She appears well-developed and well-nourished. She is cooperative.   HENT:   Head: Normocephalic and atraumatic.   Eyes: Conjunctivae, EOM and lids are normal. Pupils are equal, round, and reactive to light.   Neck: Trachea normal and phonation normal. Neck supple. No JVD present.   Normal range of motion.   Full passive range of motion without pain.     Cardiovascular:  Normal rate, regular rhythm, S1 normal, S2 normal, normal heart sounds, intact distal pulses and normal pulses.           Pulmonary/Chest: Effort normal and breath sounds normal.   Abdominal: Abdomen is soft and flat. Bowel sounds are normal.   Musculoskeletal:         General: Normal range of motion.      Cervical back: Full passive range of motion without pain, normal range of motion and neck supple.     Neurological: She is alert and oriented to person, place, and time. She has normal strength.   Skin: Skin is warm, dry and intact. Capillary refill takes less than 2 seconds.         ED Course   Procedures  Labs Reviewed   COMPREHENSIVE METABOLIC PANEL - Abnormal       Result Value    Sodium 140      Potassium 5.0      Chloride 102      CO2 24      Glucose 134 (*)     BUN 77 (*)     Creatinine 2.7 (*)     Calcium 9.9      Total Protein 6.3      Albumin 3.7      Total Bilirubin 2.4 (*)     Alkaline Phosphatase 51 (*)     AST 20      ALT 16      eGFR 20 (*)     Anion Gap 14     CBC W/ AUTO DIFFERENTIAL - Abnormal    WBC 5.46      RBC 5.62 (*)     Hemoglobin 11.0 (*)     Hematocrit 36.7 (*)     MCV 65 (*)     MCH 19.6 (*)     MCHC 30.0 (*)     RDW 31.1 (*)     Platelets 180      MPV SEE COMMENT      Immature Granulocytes 0.2      Gran # (ANC) 3.6      Immature Grans (Abs) 0.01      Lymph # 1.1      Mono # 0.6      Eos # 0.1      Baso # 0.04      nRBC 9 (*)     Gran % 66.0      Lymph % 19.8      Mono % 11.7      Eosinophil % 1.6      Basophil % 0.7      Platelet Estimate Appears normal      Aniso  Marked      Poik Marked      Poly Moderate      Hypo Occasional      Ovalocytes Moderate      Target Cells Occasional      Tear Drop Cells Occasional      Spherocytes Occasional      Schistocytes Present      Large/Giant Platelets Present      Fragmented Cells Marked      Differential Method Automated     TROPONIN I - Abnormal    Troponin I 1.885 (*)    B-TYPE NATRIURETIC PEPTIDE - Abnormal    BNP 2,754 (*)    CK    CPK 31       EKG Readings: (Independently Interpreted)   EKG performed at 9:55 p.m. on July 22, 2024  Sinus tachycardia with premature atrial complexes  Possible left atrial enlargement  Left anterior fascicular block  Nonspecific T-wave abnormality  Prolonged QT interval  No obvious change from previous EKG on comparison  Adriano Low M.D. 11:57 PM 7/22/2024        Imaging Results              X-Ray Chest 1 View (Final result)  Result time 07/22/24 22:41:29      Final result by Sunita Ortiz MD (07/22/24 22:41:29)                   Impression:      As above.      Electronically signed by: Sunita Ortiz  Date:    07/22/2024  Time:    22:41               Narrative:    EXAMINATION:  XR CHEST 1 VIEW    CLINICAL HISTORY:  Shortness of breath;    TECHNIQUE:  Single frontal view of the chest was performed.    COMPARISON:  06/27/2024    FINDINGS:  Enlarged cardiac silhouette.  Left lung base is obscured.  Right basilar airspace disease.  Right upper extremity PICC line, unchanged in position.  Left chest wall AICD.                                       Medications   furosemide injection 40 mg (40 mg Intravenous Given 7/22/24 3592)     Medical Decision Making  58-year-old female with end-stage heart disease with shortness of breath & dizziness  Differential includes congestive heart failure, pulmonary hypertension, end of life care  Differential also includes STEMI, non-STEMI, pneumonia, COVID, influenza, anxiety    Problems Addressed:  CKD (chronic kidney disease) stage 4, GFR 15-29 ml/min:  complicated acute illness or injury  Demand ischemia: complicated acute illness or injury  Depression due to physical illness: complicated acute illness or injury  Elevated troponin: complicated acute illness or injury  Hypertensive cardiovascular-renal disease, stage 1-4 or unspecified chronic kidney disease, with heart failure: complicated acute illness or injury  NICM (nonischemic cardiomyopathy): complicated acute illness or injury  Pulmonary arterial hypertension: complicated acute illness or injury  Pulmonary HTN: complicated acute illness or injury  Pulmonary hypertension: complicated acute illness or injury  SOB (shortness of breath): complicated acute illness or injury    Amount and/or Complexity of Data Reviewed  External Data Reviewed: notes.  Labs: ordered. Decision-making details documented in ED Course.  Radiology: ordered and independent interpretation performed.  ECG/medicine tests: ordered and independent interpretation performed.    ED Management & Risks of Complication, Morbidity, & Mortality:  Creatinine 2.7 with BNP of 2700 with elevated troponin  Patient with markedly reduced ejection fraction on last echo  Patient given IV Lasix in the emergency room, palliative dobutamine   Has been in & out of [facility] with end-stage heart disease  Patient recently met again with palliative care 2 weeks ago on 11th  She does not want to go on hospice yet but has very few options  I discussed her with Dr. Post with heart transplant this evening  He recommended admission to [facility]  Heart transplant has no other interventions they can perform now    Critical Care ED Physician Time (minutes):  -- Performed by: Adriano Low M.D.  -- Date/Time: 12:03 AM 7/23/2024   -- Direct Patient Care (Face Time): 15  -- Additional History from Records or Taking Additional History: 15  -- Ordering, Reviewing, and Interpreting Diagnostic Studies: 15  -- Total Time in Documentation: 15  --  Consultation with Other Physicians: 15  -- Consultation with Family Related to Condition: 15  -- Total Critical Care Time: 90  -- Critical care was necessary to treat end-stage congestive heart failure  -- Critical care was time spent personally by me on the following activities:   -- discussions with consultants regarding treatment plan today  -- development of treatment plan with patient & their family  -- examination of patient, ordering and performing treatments   -- review of radiographic studies, re-evaluation of pt's condition  -- review of labs and evaluation of response to treatment     Clinical Impression:  Final diagnoses:  [R06.02] SOB (shortness of breath)  [R79.89] Elevated troponin (Primary)  [F06.31] Depression due to physical illness (Chronic)  [I13.0] Hypertensive cardiovascular-renal disease, stage 1-4 or unspecified chronic kidney disease, with heart failure  [I24.89] Demand ischemia  [I27.20] Pulmonary hypertension  [I27.21] Pulmonary arterial hypertension  [N18.4] CKD (chronic kidney disease) stage 4, GFR 15-29 ml/min  [I27.20] Pulmonary HTN (Chronic)  [I42.8] NICM (nonischemic cardiomyopathy) (Chronic)  [I50.42] Chronic combined systolic and diastolic heart failure (Chronic)  [R06.02] Shortness of breath  [I10] Essential hypertension  [I51.7] Left ventricular hypertrophy (Chronic)  [N18.4] CKD (chronic kidney disease), stage IV (Chronic)  [I50.9] Acute decompensated heart failure  [J44.1] COPD exacerbation  [I50.20] ACC/AHA stage D systolic heart failure          ED Disposition Condition    Transfer to Another Facility Adriano Artis MD  07/23/24 0004

## 2024-07-23 NOTE — PROVIDER TRANSFER
Outside Transfer Acceptance Note / Regional Referral Center    Referring facility: [facility]   Referring provider: CATHY KELLER  Accepting facility: [facility]  Accepting provider: Ines Hyde MD  Admitting provider: Ines Hyde MD  Reason for transfer:  HLOC  Transfer diagnosis: ADHF  Transfer specialty requested: Cardiology  Transfer specialty notified: Yes  Transfer level: NUMBER 1-5: 2  Bed type requested: Stepdown  Isolation status: No active isolations   Admission class or status: IP- Inpatient    Narrative     57yo F with HTN, heart failure on palliative dobutamine infusion, AFib, prior DVT, CKD, GERD, diabetes, sleep apnea, presenting with chest pressure, SOB, and dizziness. Afebrile and HDS. Mildly tachycardic, but otherwise VS wnl. On low flow NC for comfort, but no hypoxia. Pt states her  gtt rate was recently lowered due to weight loss.    Workup most significant for hgb 11.0, normal WBC and PLTs, normal Na and K, BUN 77, Cr 2.7 (below her baseline which seems to be mid 3s-4), BG 130s, T bili 2.4, BNP 2,754 (appears to be approx her recent baseline - this month, although 7597-3950 in June), trop 1.885 (lower than recent baseline). CXR with enlarged cardiac silhouette.  Left lung base is obscured.  Right basilar airspace disease.  Right upper extremity PICC line, unchanged in position.  Left chest wall AICD.     She was given 40 mg IV lasix, breathing treatments, and O2 by NC for comfort in the ED. Not much UOP since receiving 40 mg IV lasix. I recommended the ED  give 80 mg IV lasix prior to transfer. Transfer is requested for cardiology consult. She is not yet ready to consider hospice, but is following with palliative medicine. PFC spoke to [facility] and since pt is on palliative  with no advanced options, they requested admission to hospital medicine.    Objective     Vitals: Temp: 97.5 °F (36.4 °C) (07/22/24 2142)  Pulse: 105 (07/23/24 0002)  Resp: 18  "(07/22/24 2142)  BP: 113/78 (07/23/24 0002)  SpO2: 96 % (07/23/24 0002)  Recent Labs: All pertinent labs within the past 24 hours have been reviewed.  Recent imaging: as above   Airway: no airway concerns     Vent settings:  n/a    IV access:        Peripheral IV - Single Lumen 07/22/24 2210 20 G Anterior;Left;Proximal Forearm (Active)   Site Assessment Clean;Dry;Intact 07/22/24 2211   Extremity Assessment Distal to IV No abnormal discoloration;No redness;No swelling 07/22/24 2211   Dressing Status Clean;Dry;Intact 07/22/24 2211   Dressing Intervention First dressing 07/22/24 2211     Infusions: home  gtt  Allergies:   Review of patient's allergies indicates:   Allergen Reactions    Penicillins Hives and Other (See Comments)    Iodinated contrast media Nausea And Vomiting    Oxycodone-acetaminophen Other (See Comments)     Nausea, Dizziness, Anxiety.  "I don't like how it makes me feel."   Given Hydromorphone 0.5mg IVP  Without problems.  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 Metabolism of other drugs not listed here may also be impacted by UGT1A1 enzyme activity.       Tramadol Nausea And Vomiting and Other (See Comments)     Other reaction(s): Other (See Comments)    Valsartan Other (See Comments)      NPO: No    Anticoagulation:   Anticoagulants       None             Instructions      Arie Vazquez-  Admit to Hospital Medicine  Upon patient arrival to floor, " please send SecureChat to [facility] or call extension [phone] (if no answer, do NOT leave a callback number after the beep, rather please send a SecureChat to [facility]), for Hospital Medicine admit team assignment and for additional admit orders for the patient.  Do not page the attending physician associated with the patient on arrival (this physician may not be on duty at the time of arrival).  Rather, always send a SecureChat to [facility] or call [phone] to reach the triage physician for orders and team assignment.

## 2024-07-23 NOTE — ED TRIAGE NOTES
58 y.o. female presents to ER Room/bed info not found   Chief Complaint   Patient presents with    Dizziness   .   C/o weakness and dizziness for 1-2 days   responds to verbal commands/alert and oriented x 3/responds to pain

## 2024-07-24 ENCOUNTER — DOCUMENTATION ONLY (OUTPATIENT)
Dept: CARDIOLOGY | Facility: CLINIC | Age: 59
End: 2024-07-24
Payer: MEDICAID

## 2024-07-24 ENCOUNTER — HOSPITAL ENCOUNTER (OUTPATIENT)
Facility: HOSPITAL | Age: 59
Discharge: HOME | End: 2024-07-26
Attending: STUDENT IN AN ORGANIZED HEALTH CARE EDUCATION/TRAINING PROGRAM | Admitting: FAMILY MEDICINE
Payer: MEDICAID

## 2024-07-24 DIAGNOSIS — I50.84 END STAGE HEART FAILURE: Primary | Chronic | ICD-10-CM

## 2024-07-24 DIAGNOSIS — R07.9 CHEST PAIN: ICD-10-CM

## 2024-07-24 DIAGNOSIS — N18.4 CKD (CHRONIC KIDNEY DISEASE) STAGE 4, GFR 15-29 ML/MIN: Chronic | ICD-10-CM

## 2024-07-24 DIAGNOSIS — I50.9 ACUTE DECOMPENSATED HEART FAILURE: ICD-10-CM

## 2024-07-24 DIAGNOSIS — T50.905A MEDICATION ADVERSE EFFECT: ICD-10-CM

## 2024-07-24 PROBLEM — J10.1 INFLUENZA A: Status: RESOLVED | Noted: 2024-05-05 | Resolved: 2024-07-24

## 2024-07-24 PROBLEM — R25.3 MUSCLE TWITCH: Status: RESOLVED | Noted: 2021-11-02 | Resolved: 2024-07-24

## 2024-07-24 PROBLEM — R07.89 CHEST WALL TENDERNESS: Status: RESOLVED | Noted: 2023-06-28 | Resolved: 2024-07-24

## 2024-07-24 PROBLEM — E11.9 TYPE 2 DIABETES MELLITUS WITHOUT COMPLICATION, WITHOUT LONG-TERM CURRENT USE OF INSULIN: Status: RESOLVED | Noted: 2023-10-05 | Resolved: 2024-07-24

## 2024-07-24 PROBLEM — D63.8 ANEMIA OF CHRONIC DISEASE: Status: ACTIVE | Noted: 2023-10-05

## 2024-07-24 PROBLEM — J98.4 RESTRICTIVE LUNG DISEASE: Chronic | Status: ACTIVE | Noted: 2022-04-26

## 2024-07-24 PROBLEM — I51.7 ATRIAL ENLARGEMENT, BILATERAL: Status: RESOLVED | Noted: 2023-06-20 | Resolved: 2024-07-24

## 2024-07-24 PROBLEM — R29.898 BILATERAL LEG WEAKNESS: Status: RESOLVED | Noted: 2022-06-29 | Resolved: 2024-07-24

## 2024-07-24 PROBLEM — R68.83 CHILLS: Status: RESOLVED | Noted: 2023-03-26 | Resolved: 2024-07-24

## 2024-07-24 PROBLEM — Z51.5 PALLIATIVE CARE ENCOUNTER: Status: RESOLVED | Noted: 2023-05-15 | Resolved: 2024-07-24

## 2024-07-24 PROBLEM — I50.43 ACUTE ON CHRONIC COMBINED SYSTOLIC AND DIASTOLIC HEART FAILURE: Status: RESOLVED | Noted: 2021-11-30 | Resolved: 2024-07-24

## 2024-07-24 PROBLEM — R52 PAIN: Status: RESOLVED | Noted: 2023-06-29 | Resolved: 2024-07-24

## 2024-07-24 PROBLEM — R11.0 NAUSEA: Status: ACTIVE | Noted: 2024-07-24

## 2024-07-24 PROBLEM — J44.1 COPD EXACERBATION: Status: RESOLVED | Noted: 2023-04-27 | Resolved: 2024-07-24

## 2024-07-24 PROBLEM — E78.2 HYPERLIPIDEMIA, MIXED: Chronic | Status: ACTIVE | Noted: 2024-02-01

## 2024-07-24 PROBLEM — I47.29 NSVT (NONSUSTAINED VENTRICULAR TACHYCARDIA): Status: RESOLVED | Noted: 2022-11-02 | Resolved: 2024-07-24

## 2024-07-24 PROBLEM — R06.00 DYSPNEA: Status: RESOLVED | Noted: 2023-05-15 | Resolved: 2024-07-24

## 2024-07-24 PROBLEM — R10.9 ABDOMINAL PAIN: Status: RESOLVED | Noted: 2024-06-21 | Resolved: 2024-07-24

## 2024-07-24 PROBLEM — M10.9 GOUT, ARTHRITIS: Chronic | Status: ACTIVE | Noted: 2023-06-06

## 2024-07-24 PROBLEM — I27.20 PULMONARY HYPERTENSION: Status: RESOLVED | Noted: 2023-02-22 | Resolved: 2024-07-24

## 2024-07-24 PROBLEM — D72.819 LEUKOPENIA: Status: RESOLVED | Noted: 2020-08-13 | Resolved: 2024-07-24

## 2024-07-24 PROBLEM — M77.52 BURSITIS OF LEFT FOOT: Status: RESOLVED | Noted: 2023-10-05 | Resolved: 2024-07-24

## 2024-07-24 PROBLEM — R11.2 NAUSEA AND VOMITING: Status: RESOLVED | Noted: 2023-08-11 | Resolved: 2024-07-24

## 2024-07-24 PROBLEM — R53.83 FATIGUE: Status: ACTIVE | Noted: 2024-07-24

## 2024-07-24 PROBLEM — R82.71 ASYMPTOMATIC BACTERIURIA: Status: RESOLVED | Noted: 2023-04-27 | Resolved: 2024-07-24

## 2024-07-24 PROBLEM — I24.89 DEMAND ISCHEMIA: Status: RESOLVED | Noted: 2023-01-04 | Resolved: 2024-07-24

## 2024-07-24 PROBLEM — R06.02 SHORTNESS OF BREATH: Status: RESOLVED | Noted: 2021-05-16 | Resolved: 2024-07-24

## 2024-07-24 PROBLEM — Z79.899 ON DEEP VEIN THROMBOSIS (DVT) PROPHYLAXIS: Status: RESOLVED | Noted: 2021-05-17 | Resolved: 2024-07-24

## 2024-07-24 PROBLEM — G57.90 LOWER EXTREMITY NEUROPATHY: Chronic | Status: ACTIVE | Noted: 2019-08-14

## 2024-07-24 PROBLEM — D63.8 ANEMIA OF CHRONIC DISEASE: Chronic | Status: ACTIVE | Noted: 2023-10-05

## 2024-07-24 PROBLEM — I27.21 PULMONARY ARTERIAL HYPERTENSION: Chronic | Status: ACTIVE | Noted: 2024-03-06

## 2024-07-24 LAB
ANISOCYTOSIS BLD QL SMEAR: ABNORMAL
BASOPHILS # BLD AUTO: 0.04 K/UL (ref 0–0.2)
BASOPHILS NFR BLD: 0.7 % (ref 0–1.9)
BNP SERPL-MCNC: 2495 PG/ML (ref 0–99)
DIFFERENTIAL METHOD BLD: ABNORMAL
EOSINOPHIL # BLD AUTO: 0.1 K/UL (ref 0–0.5)
EOSINOPHIL NFR BLD: 1.9 % (ref 0–8)
ERYTHROCYTE [DISTWIDTH] IN BLOOD BY AUTOMATED COUNT: 31.5 % (ref 11.5–14.5)
ESTIMATED AVG GLUCOSE: 103 MG/DL (ref 68–131)
HBA1C MFR BLD: 5.2 % (ref 4–5.6)
HCT VFR BLD AUTO: 36.6 % (ref 37–48.5)
HGB BLD-MCNC: 10.6 G/DL (ref 12–16)
HOWELL-JOLLY BOD BLD QL SMEAR: ABNORMAL
IMM GRANULOCYTES # BLD AUTO: 0.04 K/UL (ref 0–0.04)
IMM GRANULOCYTES NFR BLD AUTO: 0.7 % (ref 0–0.5)
LYMPHOCYTES # BLD AUTO: 1.2 K/UL (ref 1–4.8)
LYMPHOCYTES NFR BLD: 20.2 % (ref 18–48)
MCH RBC QN AUTO: 19.4 PG (ref 27–31)
MCHC RBC AUTO-ENTMCNC: 29 G/DL (ref 32–36)
MCV RBC AUTO: 67 FL (ref 82–98)
MONOCYTES # BLD AUTO: 0.7 K/UL (ref 0.3–1)
MONOCYTES NFR BLD: 12 % (ref 4–15)
NEUTROPHILS # BLD AUTO: 3.7 K/UL (ref 1.8–7.7)
NEUTROPHILS NFR BLD: 64.5 % (ref 38–73)
NRBC BLD-RTO: 9 /100 WBC
OHS QRS DURATION: 108 MS
OHS QTC CALCULATION: 484 MS
OVALOCYTES BLD QL SMEAR: ABNORMAL
PLATELET # BLD AUTO: 182 K/UL (ref 150–450)
PMV BLD AUTO: ABNORMAL FL (ref 9.2–12.9)
POCT GLUCOSE: 136 MG/DL (ref 70–110)
POCT GLUCOSE: 173 MG/DL (ref 70–110)
POIKILOCYTOSIS BLD QL SMEAR: ABNORMAL
POLYCHROMASIA BLD QL SMEAR: ABNORMAL
RBC # BLD AUTO: 5.46 M/UL (ref 4–5.4)
SCHISTOCYTES BLD QL SMEAR: ABNORMAL
SPHEROCYTES BLD QL SMEAR: ABNORMAL
TARGETS BLD QL SMEAR: ABNORMAL
TROPONIN I SERPL DL<=0.01 NG/ML-MCNC: 1.62 NG/ML (ref 0–0.03)
WBC # BLD AUTO: 5.69 K/UL (ref 3.9–12.7)

## 2024-07-24 PROCEDURE — 94761 N-INVAS EAR/PLS OXIMETRY MLT: CPT

## 2024-07-24 PROCEDURE — 93010 ELECTROCARDIOGRAM REPORT: CPT | Mod: ,,, | Performed by: INTERNAL MEDICINE

## 2024-07-24 PROCEDURE — 94640 AIRWAY INHALATION TREATMENT: CPT

## 2024-07-24 PROCEDURE — 83880 ASSAY OF NATRIURETIC PEPTIDE: CPT

## 2024-07-24 PROCEDURE — 25000242 PHARM REV CODE 250 ALT 637 W/ HCPCS

## 2024-07-24 PROCEDURE — 85025 COMPLETE CBC W/AUTO DIFF WBC: CPT

## 2024-07-24 PROCEDURE — 27000221 HC OXYGEN, UP TO 24 HOURS

## 2024-07-24 PROCEDURE — 63600175 PHARM REV CODE 636 W HCPCS: Performed by: HOSPITALIST

## 2024-07-24 PROCEDURE — 87040 BLOOD CULTURE FOR BACTERIA: CPT | Performed by: NURSE PRACTITIONER

## 2024-07-24 PROCEDURE — G0378 HOSPITAL OBSERVATION PER HR: HCPCS

## 2024-07-24 PROCEDURE — 83036 HEMOGLOBIN GLYCOSYLATED A1C: CPT | Performed by: HOSPITALIST

## 2024-07-24 PROCEDURE — 93005 ELECTROCARDIOGRAM TRACING: CPT

## 2024-07-24 PROCEDURE — 25000242 PHARM REV CODE 250 ALT 637 W/ HCPCS: Performed by: INTERNAL MEDICINE

## 2024-07-24 PROCEDURE — 20600001 HC STEP DOWN PRIVATE ROOM

## 2024-07-24 PROCEDURE — 99223 1ST HOSP IP/OBS HIGH 75: CPT | Mod: ,,, | Performed by: EMERGENCY MEDICINE

## 2024-07-24 PROCEDURE — 84484 ASSAY OF TROPONIN QUANT: CPT

## 2024-07-24 PROCEDURE — 99900035 HC TECH TIME PER 15 MIN (STAT)

## 2024-07-24 PROCEDURE — 36415 COLL VENOUS BLD VENIPUNCTURE: CPT

## 2024-07-24 PROCEDURE — G0379 DIRECT REFER HOSPITAL OBSERV: HCPCS

## 2024-07-24 PROCEDURE — 25000003 PHARM REV CODE 250: Performed by: HOSPITALIST

## 2024-07-24 PROCEDURE — 25000003 PHARM REV CODE 250

## 2024-07-24 RX ORDER — METOPROLOL SUCCINATE 25 MG/1
25 TABLET, EXTENDED RELEASE ORAL DAILY
Status: DISCONTINUED | OUTPATIENT
Start: 2024-07-24 | End: 2024-07-24

## 2024-07-24 RX ORDER — POLYETHYLENE GLYCOL 3350 17 G/17G
17 POWDER, FOR SOLUTION ORAL DAILY
Status: DISCONTINUED | OUTPATIENT
Start: 2024-07-24 | End: 2024-07-26 | Stop reason: HOSPADM

## 2024-07-24 RX ORDER — BUMETANIDE 1 MG/1
2 TABLET ORAL NIGHTLY
Status: DISCONTINUED | OUTPATIENT
Start: 2024-07-24 | End: 2024-07-26 | Stop reason: HOSPADM

## 2024-07-24 RX ORDER — SODIUM CHLORIDE 0.9 % (FLUSH) 0.9 %
10 SYRINGE (ML) INJECTION
Status: DISCONTINUED | OUTPATIENT
Start: 2024-07-24 | End: 2024-07-26 | Stop reason: HOSPADM

## 2024-07-24 RX ORDER — PREGABALIN 50 MG/1
50 CAPSULE ORAL NIGHTLY
Status: DISCONTINUED | OUTPATIENT
Start: 2024-07-24 | End: 2024-07-26 | Stop reason: HOSPADM

## 2024-07-24 RX ORDER — IPRATROPIUM BROMIDE 0.5 MG/2.5ML
0.5 SOLUTION RESPIRATORY (INHALATION) EVERY 4 HOURS PRN
Status: DISCONTINUED | OUTPATIENT
Start: 2024-07-24 | End: 2024-07-26 | Stop reason: HOSPADM

## 2024-07-24 RX ORDER — SERTRALINE HYDROCHLORIDE 100 MG/1
100 TABLET, FILM COATED ORAL DAILY
Status: DISCONTINUED | OUTPATIENT
Start: 2024-07-24 | End: 2024-07-24

## 2024-07-24 RX ORDER — IPRATROPIUM BROMIDE AND ALBUTEROL SULFATE 2.5; .5 MG/3ML; MG/3ML
3 SOLUTION RESPIRATORY (INHALATION) EVERY 6 HOURS PRN
Status: DISCONTINUED | OUTPATIENT
Start: 2024-07-24 | End: 2024-07-24

## 2024-07-24 RX ORDER — INSULIN ASPART 100 [IU]/ML
0-5 INJECTION, SOLUTION INTRAVENOUS; SUBCUTANEOUS
Status: DISCONTINUED | OUTPATIENT
Start: 2024-07-24 | End: 2024-07-26 | Stop reason: HOSPADM

## 2024-07-24 RX ORDER — BUMETANIDE 1 MG/1
4 TABLET ORAL DAILY
Status: DISCONTINUED | OUTPATIENT
Start: 2024-07-24 | End: 2024-07-26 | Stop reason: HOSPADM

## 2024-07-24 RX ORDER — NITROGLYCERIN 0.4 MG/1
0.4 TABLET SUBLINGUAL EVERY 5 MIN PRN
Status: DISCONTINUED | OUTPATIENT
Start: 2024-07-24 | End: 2024-07-24

## 2024-07-24 RX ORDER — ATORVASTATIN CALCIUM 40 MG/1
40 TABLET, FILM COATED ORAL NIGHTLY
Status: DISCONTINUED | OUTPATIENT
Start: 2024-07-24 | End: 2024-07-26 | Stop reason: HOSPADM

## 2024-07-24 RX ORDER — ACETAMINOPHEN 325 MG/1
650 TABLET ORAL EVERY 4 HOURS PRN
Status: CANCELLED | OUTPATIENT
Start: 2024-07-24

## 2024-07-24 RX ORDER — ASPIRIN 81 MG/1
81 TABLET ORAL DAILY
Status: DISCONTINUED | OUTPATIENT
Start: 2024-07-24 | End: 2024-07-26 | Stop reason: HOSPADM

## 2024-07-24 RX ORDER — AMIODARONE HYDROCHLORIDE 200 MG/1
200 TABLET ORAL DAILY
Status: DISCONTINUED | OUTPATIENT
Start: 2024-07-24 | End: 2024-07-26 | Stop reason: HOSPADM

## 2024-07-24 RX ORDER — IBUPROFEN 200 MG
16 TABLET ORAL
Status: DISCONTINUED | OUTPATIENT
Start: 2024-07-24 | End: 2024-07-26 | Stop reason: HOSPADM

## 2024-07-24 RX ORDER — LANOLIN ALCOHOL/MO/W.PET/CERES
1000 CREAM (GRAM) TOPICAL DAILY
COMMUNITY

## 2024-07-24 RX ORDER — LANOLIN ALCOHOL/MO/W.PET/CERES
400 CREAM (GRAM) TOPICAL NIGHTLY
Status: DISCONTINUED | OUTPATIENT
Start: 2024-07-24 | End: 2024-07-26 | Stop reason: HOSPADM

## 2024-07-24 RX ORDER — ALBUTEROL SULFATE 90 UG/1
2 AEROSOL, METERED RESPIRATORY (INHALATION) EVERY 6 HOURS PRN
Status: DISCONTINUED | OUTPATIENT
Start: 2024-07-24 | End: 2024-07-24

## 2024-07-24 RX ORDER — ONDANSETRON HYDROCHLORIDE 2 MG/ML
4 INJECTION, SOLUTION INTRAVENOUS EVERY 6 HOURS PRN
Status: DISCONTINUED | OUTPATIENT
Start: 2024-07-24 | End: 2024-07-26 | Stop reason: HOSPADM

## 2024-07-24 RX ORDER — GLUCAGON 1 MG
1 KIT INJECTION
Status: DISCONTINUED | OUTPATIENT
Start: 2024-07-24 | End: 2024-07-26 | Stop reason: HOSPADM

## 2024-07-24 RX ORDER — MORPHINE SULFATE 2 MG/ML
2 INJECTION, SOLUTION INTRAMUSCULAR; INTRAVENOUS EVERY 4 HOURS PRN
Status: DISCONTINUED | OUTPATIENT
Start: 2024-07-24 | End: 2024-07-24

## 2024-07-24 RX ORDER — PROMETHAZINE HYDROCHLORIDE AND CODEINE PHOSPHATE 6.25; 1 MG/5ML; MG/5ML
10 SOLUTION ORAL EVERY 6 HOURS PRN
Status: DISCONTINUED | OUTPATIENT
Start: 2024-07-24 | End: 2024-07-26 | Stop reason: HOSPADM

## 2024-07-24 RX ORDER — ALLOPURINOL 300 MG/1
300 TABLET ORAL DAILY
Status: DISCONTINUED | OUTPATIENT
Start: 2024-07-24 | End: 2024-07-26 | Stop reason: HOSPADM

## 2024-07-24 RX ORDER — DOBUTAMINE HYDROCHLORIDE 400 MG/100ML
5 INJECTION INTRAVENOUS CONTINUOUS
Status: DISCONTINUED | OUTPATIENT
Start: 2024-07-24 | End: 2024-07-26 | Stop reason: HOSPADM

## 2024-07-24 RX ORDER — FERROUS SULFATE 325(65) MG
325 TABLET, DELAYED RELEASE (ENTERIC COATED) ORAL DAILY
COMMUNITY

## 2024-07-24 RX ORDER — NALOXONE HCL 0.4 MG/ML
0.02 VIAL (ML) INJECTION
Status: DISCONTINUED | OUTPATIENT
Start: 2024-07-24 | End: 2024-07-26 | Stop reason: HOSPADM

## 2024-07-24 RX ORDER — PANTOPRAZOLE SODIUM 40 MG/1
40 TABLET, DELAYED RELEASE ORAL DAILY
Status: DISCONTINUED | OUTPATIENT
Start: 2024-07-24 | End: 2024-07-26 | Stop reason: HOSPADM

## 2024-07-24 RX ORDER — BUMETANIDE 1 MG/1
2 TABLET ORAL DAILY
Status: DISCONTINUED | OUTPATIENT
Start: 2024-07-24 | End: 2024-07-24

## 2024-07-24 RX ORDER — SODIUM CHLORIDE 0.9 % (FLUSH) 0.9 %
10 SYRINGE (ML) INJECTION EVERY 12 HOURS PRN
Status: DISCONTINUED | OUTPATIENT
Start: 2024-07-24 | End: 2024-07-26 | Stop reason: HOSPADM

## 2024-07-24 RX ORDER — HYDROMORPHONE HYDROCHLORIDE 1 MG/ML
0.5 INJECTION, SOLUTION INTRAMUSCULAR; INTRAVENOUS; SUBCUTANEOUS EVERY 4 HOURS PRN
Status: DISCONTINUED | OUTPATIENT
Start: 2024-07-24 | End: 2024-07-26 | Stop reason: HOSPADM

## 2024-07-24 RX ORDER — IBUPROFEN 200 MG
24 TABLET ORAL
Status: DISCONTINUED | OUTPATIENT
Start: 2024-07-24 | End: 2024-07-26 | Stop reason: HOSPADM

## 2024-07-24 RX ORDER — ACETAMINOPHEN 325 MG/1
650 TABLET ORAL EVERY 6 HOURS PRN
Status: DISCONTINUED | OUTPATIENT
Start: 2024-07-24 | End: 2024-07-26 | Stop reason: HOSPADM

## 2024-07-24 RX ORDER — PROMETHAZINE HYDROCHLORIDE AND CODEINE PHOSPHATE 6.25; 1 MG/5ML; MG/5ML
10 SOLUTION ORAL ONCE
Status: COMPLETED | OUTPATIENT
Start: 2024-07-24 | End: 2024-07-24

## 2024-07-24 RX ORDER — BUMETANIDE 1 MG/1
2 TABLET ORAL NIGHTLY PRN
Status: DISCONTINUED | OUTPATIENT
Start: 2024-07-24 | End: 2024-07-24

## 2024-07-24 RX ORDER — BENZONATATE 100 MG/1
100 CAPSULE ORAL 3 TIMES DAILY PRN
Status: DISCONTINUED | OUTPATIENT
Start: 2024-07-24 | End: 2024-07-26 | Stop reason: HOSPADM

## 2024-07-24 RX ORDER — DICYCLOMINE HYDROCHLORIDE 20 MG/1
20 TABLET ORAL 3 TIMES DAILY PRN
Status: DISCONTINUED | OUTPATIENT
Start: 2024-07-24 | End: 2024-07-26 | Stop reason: HOSPADM

## 2024-07-24 RX ORDER — METOLAZONE 5 MG/1
5 TABLET ORAL DAILY
Status: DISCONTINUED | OUTPATIENT
Start: 2024-07-24 | End: 2024-07-26 | Stop reason: HOSPADM

## 2024-07-24 RX ADMIN — ASPIRIN 81 MG: 81 TABLET, COATED ORAL at 09:07

## 2024-07-24 RX ADMIN — DOBUTAMINE HYDROCHLORIDE 5 MCG/KG/MIN: 400 INJECTION INTRAVENOUS at 05:07

## 2024-07-24 RX ADMIN — BUMETANIDE 4 MG: 1 TABLET ORAL at 09:07

## 2024-07-24 RX ADMIN — HYDRALAZINE HYDROCHLORIDE: 10 TABLET ORAL at 04:07

## 2024-07-24 RX ADMIN — AMIODARONE HYDROCHLORIDE 200 MG: 200 TABLET ORAL at 09:07

## 2024-07-24 RX ADMIN — BUMETANIDE 2 MG: 1 TABLET ORAL at 08:07

## 2024-07-24 RX ADMIN — TIOTROPIUM BROMIDE INHALATION SPRAY 2 PUFF: 3.12 SPRAY, METERED RESPIRATORY (INHALATION) at 09:07

## 2024-07-24 RX ADMIN — PANTOPRAZOLE SODIUM 40 MG: 40 TABLET, DELAYED RELEASE ORAL at 09:07

## 2024-07-24 RX ADMIN — PREGABALIN 50 MG: 50 CAPSULE ORAL at 08:07

## 2024-07-24 RX ADMIN — ALLOPURINOL 300 MG: 300 TABLET ORAL at 09:07

## 2024-07-24 RX ADMIN — METOLAZONE 5 MG: 5 TABLET ORAL at 09:07

## 2024-07-24 RX ADMIN — APIXABAN 2.5 MG: 2.5 TABLET, FILM COATED ORAL at 08:07

## 2024-07-24 RX ADMIN — EMPAGLIFLOZIN 10 MG: 25 TABLET, FILM COATED ORAL at 09:07

## 2024-07-24 RX ADMIN — HYDRALAZINE HYDROCHLORIDE: 10 TABLET ORAL at 09:07

## 2024-07-24 RX ADMIN — Medication 400 MG: at 08:07

## 2024-07-24 RX ADMIN — ATORVASTATIN CALCIUM 40 MG: 40 TABLET, FILM COATED ORAL at 08:07

## 2024-07-24 RX ADMIN — PROMETHAZINE HYDROCHLORIDE AND CODEINE PHOSPHATE 10 ML: 6.25; 1 SOLUTION ORAL at 02:07

## 2024-07-24 RX ADMIN — PROMETHAZINE HYDROCHLORIDE AND CODEINE PHOSPHATE 10 ML: 6.25; 1 SOLUTION ORAL at 03:07

## 2024-07-24 RX ADMIN — APIXABAN 2.5 MG: 2.5 TABLET, FILM COATED ORAL at 09:07

## 2024-07-24 RX ADMIN — POLYETHYLENE GLYCOL 3350 17 G: 17 POWDER, FOR SOLUTION ORAL at 09:07

## 2024-07-24 RX ADMIN — HYDRALAZINE HYDROCHLORIDE: 10 TABLET ORAL at 08:07

## 2024-07-24 NOTE — PLAN OF CARE
Problem: Adult Inpatient Plan of Care  Goal: Plan of Care Review  Outcome: Progressing  Goal: Patient-Specific Goal (Individualized)  Outcome: Progressing  Goal: Absence of Hospital-Acquired Illness or Injury  Outcome: Progressing  Goal: Optimal Comfort and Wellbeing  Outcome: Progressing  Goal: Readiness for Transition of Care  Outcome: Progressing     Problem: Diabetes Comorbidity  Goal: Blood Glucose Level Within Targeted Range  Outcome: Progressing     Problem: Infection  Goal: Absence of Infection Signs and Symptoms  Outcome: Progressing     Problem: Skin Injury Risk Increased  Goal: Skin Health and Integrity  Outcome: Progressing     Problem: Coping Ineffective  Goal: Effective Coping  Outcome: Progressing     Problem: Fall Injury Risk  Goal: Absence of Fall and Fall-Related Injury  Outcome: Progressing             Plan of care reviewed with patient.     Patient is AOX4 , NAD     Patient remained free of falls and trauma, fall precautions are in place.    Patient is ambulating independently.    Patient denies reports of pain    Patients care plan and medication discussed.     Patient has no questions at this time/ verbalized understanding.     Bed is in low position and wheels are locked for patient safety.      The call light is within pt's reach.     Telemetry iis properly functioing    Pt remains free of falls, injury, and trauma.

## 2024-07-24 NOTE — HPI
Ms. Hafsa Hawley is a 57yo F with a PMHx of NICM s/p AICD placement on palliative dobutamine infusion, Afib (on amiodarone and eliquis), CKD, HTN, diabetes, presenting as a transfer from [facility] with a chief complaint of fatigue. Patient also complaining of coughing, chest pressure, difficulty breathing, and dizziness. Patient reports she's been feeling this way for the past week. She does not report any changes to her diet or fluid intake. She does have home oxygen which she uses as needed. She does mention that she has been out of her cough medication (promethazine-codeine) for the past month. Since, her cough has been more persistent and keeping her up at night. Associated symptoms include nausea, chronic and unchanged. She denies any headaches, fever, chills, chest pain, palpitations, vomiting, diarrhea, hematochezia, urinary symptoms, or leg swelling. She has been compliant with her medications including her GDMT medications.     ED Course at [facility]:  Afebrile and HDS. Mildly tachycardic, but otherwise VS wnl. On low flow NC for comfort, but no hypoxia. Pt states her  gtt rate was recently lowered due to weight loss. Workup most significant for hgb 11.0, normal WBC and PLTs, normal Na and K, BUN 77, Cr 2.7 (below her baseline which seems to be mid 3s-4), BG 130s, T bili 2.4, BNP 2,754 (appears to be approx her recent baseline - this month, although 6341-4007 in June), trop 1.885 (lower than recent baseline). CXR with enlarged cardiac silhouette.  Left lung base is obscured.  Right basilar airspace disease.  Right upper extremity PICC line, unchanged in position.  Left chest wall AICD.     She was given 80 mg IV lasix, breathing treatments, and O2 by NC for comfort in the ED. Not much UOP since receiving 80 mg IV lasix. Transfer is requested for cardiology consult. She is not yet ready to consider hospice, but is following with palliative medicine. PFC spoke to \[facility]\"" and since pt is on palliative   with no advanced options, they requested admission to hospital medicine.

## 2024-07-24 NOTE — CARE UPDATE
I have reviewed the chart of Hafsa Hawley who is hospitalized for the following:    Active Hospital Problems    Diagnosis    *End stage heart failure    Nausea    Fatigue    Hyperlipidemia, mixed    Anemia of chronic disease    CKD (chronic kidney disease) stage 4, GFR 15-29 ml/min    Paroxysmal A-fib    Gout, arthritis    Type 2 diabetes mellitus with stage 4 chronic kidney disease, without long-term current use of insulin    Lower extremity neuropathy    ICD (implantable cardioverter-defibrillator) in place    Depression due to physical illness    Chronic combined systolic and diastolic heart failure    Cough    NICM (nonischemic cardiomyopathy)    Essential hypertension    Elevated troponin        Argenis Mcmillan NP  Unit Based [facility]

## 2024-07-24 NOTE — ASSESSMENT & PLAN NOTE
Ms. Hafsa Hawley is a 59yo F with NICM s/p AICD placement on palliative dobutamine infusion, Afib (on amiodarone and eliquis), CKD, HTN, diabetes, presenting with a chief complaint of fatigue. She has a history of acute decompensated heart failure exacerbations and managed with GDMT. Most recent echo results shown below with an EF of 15%. The clinical picture during this admit does not fit a HF exacerbation. Her CXR shows cardiomegaly but does not highlight any major changes or increase in fluid volume. On exam, patient is in NAD, without any clinical signs of fluid overload. Lung examination was clear, no observable JVD, or lower extremity swelling. Her fatigue seems to be likely due to lack of rest secondary to her coughing episodes.     Echo    Result Date: 5/7/2024    Left Ventricle: The left ventricle is severely dilated. Severely   increased ventricular mass. Normal wall thickness. There is severe   eccentric hypertrophy. Severe global hypokinesis present. There is   severely reduced systolic function with a visually estimated ejection   fraction of 10 -15%. Grade II diastolic dysfunction. Elevated left   ventricular filling pressure. No thrombus present.    Right Ventricle: Normal right ventricular cavity size. Wall thickness   is normal. Right ventricle wall motion  is normal. Systolic function is   mildly reduced. Pacemaker lead present in the ventricle.    Left Atrium: Left atrium is severely dilated.    Right Atrium: Normal right atrial size.    Aortic Valve: The aortic valve is a trileaflet valve. There is mild   annular calcification present. There is mild aortic regurgitation.    Mitral Valve: There is moderate to severe regurgitation with an   eccentric jet and a wall hugging jet.    Tricuspid Valve: There is mild regurgitation.    Pulmonic Valve: There is mild regurgitation.    Aorta: Aortic root is normal in size measuring 2.75 cm. Ascending aorta   is normal measuring 3.49 cm.    Pulmonary  Artery: The estimated pulmonary artery systolic pressure is   44 mmHg.    IVC/SVC: Normal venous pressure at 3 mmHg.          - Continue home metoprolol, jardiance, hydralazine with isosorbide dinitrate.   - Continue home bumetanide 4mg AM and 2mg PM.   - Continue home metolazone  - Weigh daily, can adjust diuretics as needed  - Strict I/Os  - Low sodium diet

## 2024-07-24 NOTE — H&P
Arie Vazquez - Cardiology [facility] Medicine  History & Physical    Patient Name: Hafsa Hawley  MRN: [id number]  Patient Class: IP- Inpatient  Admission Date: 7/24/2024  Attending Physician: Natalie De Jesus*   Primary Care Provider: Cristobal Ann MD         Patient information was obtained from patient, past medical records, and ER records.     Subjective:     Principal Problem:<principal problem not specified>    Chief Complaint: No chief complaint on file.       HPI: Ms. Hafsa Hawley is a 59yo F with a PMHx of NICM s/p AICD placement on palliative dobutamine infusion, Afib (on amiodarone and eliquis), CKD, HTN, diabetes, presenting as a transfer from [facility] with a chief complaint of fatigue. Patient also complaining of coughing, chest pressure, difficulty breathing, and dizziness. Patient reports she's been feeling this way for the past week. She does not report any changes to her diet or fluid intake. She does have home oxygen which she uses as needed. She does mention that she has been out of her cough medication (promethazine-codeine) for the past month. Since, her cough has been more persistent and keeping her up at night. Associated symptoms include nausea, chronic and unchanged. She denies any headaches, fever, chills, chest pain, palpitations, vomiting, diarrhea, hematochezia, urinary symptoms, or leg swelling. She has been compliant with her medications including her GDMT medications.     ED Course at [facility]:  Afebrile and HDS. Mildly tachycardic, but otherwise VS wnl. On low flow NC for comfort, but no hypoxia. Pt states her  gtt rate was recently lowered due to weight loss. Workup most significant for hgb 11.0, normal WBC and PLTs, normal Na and K, BUN 77, Cr 2.7 (below her baseline which seems to be mid 3s-4), BG 130s, T bili 2.4, BNP 2,754 (appears to be approx her recent baseline - this month, although 7813-4255 in June), trop 1.885 (lower than recent baseline).  CXR with enlarged cardiac silhouette.  Left lung base is obscured.  Right basilar airspace disease.  Right upper extremity PICC line, unchanged in position.  Left chest wall AICD.     She was given 80 mg IV lasix, breathing treatments, and O2 by NC for comfort in the ED. Not much UOP since receiving 80 mg IV lasix. Transfer is requested for cardiology consult. She is not yet ready to consider hospice, but is following with palliative medicine. PFC spoke to [facility] and since pt is on palliative  with no advanced options, they requested admission to hospital medicine.     Past Medical History:   Diagnosis Date    Allergy     Anemia     Arthritis     Atrial fibrillation     OAC    BMI 32.0-32.9,adult 02/22/2023    Chronic respiratory failure with hypoxia, on home oxygen therapy     2L with activity, off at rest.  Per Pulm  no overt evidence of ILD or COPD on PFTs and CT to explain O2 needs.    CKD (chronic kidney disease), stage IV 05/08/2018    Congestive heart failure     s/p AICD placement,    Deep vein thrombosis     Depression     elevated bilirubin d/t Gilbert's syndrome     confirmed by [place] genetic testing, evaluated by hepatology    Encounter for blood transfusion     GERD (gastroesophageal reflux disease)     Hypertension     Pheochromocytoma, malignant     Right kidney mass     Sleep apnea     Thalassemia trait, alpha     Thyroid disease     Type 2 diabetes mellitus with hyperglycemia, without long-term current use of insulin 08/13/2020       Past Surgical History:   Procedure Laterality Date    ANGIOGRAM, ABDOMINAL AORTA Right 04/15/2024    APPENDECTOMY      BONE MARROW BIOPSY      CARDIAC DEFIBRILLATOR PLACEMENT Left 12/2016    CARDIAC ELECTROPHYSIOLOGY MAPPING AND ABLATION      CARDIAC ELECTROPHYSIOLOGY MAPPING AND ABLATION      COLONOSCOPY N/A 05/06/2022    Procedure: COLONOSCOPY;  Surgeon: Arely Betancourt MD;  Location: [facility] ([address] [place] [address]);  Service: Endoscopy;  Laterality: N/A;  heart transplant  "candidate/ EF 25% - 2nd floor/ defib - Biotronik - ERW  Eliquis - per Dr. Cortez with [facility] [place], Pt ok to hold Eliquis x 2 days prior-see media tab-outside correspondence dated 12/30/21  - ER[facility]  verbal instructions/portal instructions/email instructions - s    EYE SURGERY      due to running tears    FRACTURE SURGERY Left     hand 5th digit    HYSTERECTOMY      KNEE SURGERY Left 2016    hematoma    LIVER BIOPSY  10/24/2018    Minimal steatosis, predominantly macrovesicular, 1%, Minimal nonspecific portal inflammation, no fibrosis. No findings on biopsy to explain elevated bilirubin levels. Could be d/t Gilbert's =?- hemolysis    RIGHT HEART CATHETERIZATION Right 12/07/2021    Procedure: INSERTION, CATHETER, RIGHT HEART;  Surgeon: Irving Cardenas MD;  Location: [facility] CATH LAB;  Service: Cardiology;  Laterality: Right;    RIGHT HEART CATHETERIZATION Right 12/19/2022    Procedure: INSERTION, CATHETER, RIGHT HEART;  Surgeon: Burke Camilo MD;  Location: [facility] CATH LAB;  Service: Cardiology;  Laterality: Right;    RIGHT HEART CATHETERIZATION Right 03/29/2023    Procedure: INSERTION, CATHETER, RIGHT HEART;  Surgeon: Katie Liriano DO;  Location: [facility] CATH LAB;  Service: Cardiology;  Laterality: Right;    TRANSJUGULAR BIOPSY OF LIVER N/A 10/24/2018    Procedure: BIOPSY, LIVER, TRANSJUGULAR APPROACH;  Surgeon: Carmen Diagnostic Provider;  Location: [facility] OR [address] [place] [address];  Service: Radiology;  Laterality: N/A;       Review of patient's allergies indicates:   Allergen Reactions    Penicillins Hives and Other (See Comments)    Iodinated contrast media Nausea And Vomiting    Oxycodone-acetaminophen Other (See Comments)     Nausea, Dizziness, Anxiety.  "I don't like how it makes me feel."   Given Hydromorphone 0.5mg IVP  Without problems.  Other reaction(s): Other (See Comments)    Clonazepam Other (See Comments)    Diovan hct [valsartan-hydrochlorothiazide] Other (See Comments)     Causes coughing    Iodine Other (See " Comments)    Irinotecan      Pt has homozygosity for the TA7 promoter variant that places this individual at significantly increased risk for   severe neutropenia (grade 4) when treated with the standard dose of irinotecan (risk approximately 50%).   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 Metabolism of other drugs not listed here may also be impacted by UGT1A1 enzyme activity.       Tramadol Nausea And Vomiting and Other (See Comments)     Other reaction(s): Other (See Comments)    Valsartan Other (See Comments)       Current Facility-Administered Medications on File Prior to Encounter   Medication    0.9%  NaCl infusion    [COMPLETED] heparin, porcine (PF) 100 unit/mL injection flush 500 Units    [COMPLETED] ondansetron injection 4 mg    [DISCONTINUED] apixaban tablet 2.5 mg    [DISCONTINUED] atorvastatin tablet 40 mg    [DISCONTINUED] levalbuterol nebulizer solution 1.25 mg    [DISCONTINUED] pregabalin capsule 50 mg     Current Outpatient Medications on File Prior to Encounter   Medication Sig    acetaminophen (TYLENOL) 650 MG TbSR Take 1 tablet (650 mg total) by mouth every 8 (eight) hours.    albuterol-ipratropium (DUO-NEB) 2.5 mg-0.5 mg/3 mL nebulizer solution Take 3 mLs by nebulization every 6 (six) hours as needed for Wheezing or Shortness of Breath.    allopurinoL (ZYLOPRIM) 300 MG tablet Take 1 tablet (300 mg total) by mouth once daily.    ALPRAZolam (XANAX) 2 MG Tab Take 1 tablet orally 2 times a day.    amiodarone (PACERONE) 200 MG Tab take one tablet by mouth twice a day    apixaban (ELIQUIS) 2.5 mg Tab Take 1 tablet orally 2 times a day.    aspirin (ECOTRIN) 81 MG EC tablet Take 81 mg by mouth once daily.    atorvastatin (LIPITOR) 40 MG tablet Take 1 tablet (40 mg total) by mouth every evening.    atorvastatin (LIPITOR) 40 MG tablet Take 1 tablet orally once in the evening.     bumetanide (BUMEX) 2 MG tablet Take 1 tablet orally once a day.    cetirizine (ZYRTEC) 10 MG tablet Take 1 tablet (10 mg total) by mouth once daily.    dicyclomine (BENTYL) 20 mg tablet Take 1 tablet (20 mg total) by mouth 3 (three) times daily as needed (abd pain).    DOBUTamine (DOBUTREX) 1,000 mg/250 mL (4,000 mcg/mL) infusion Inject 409 mcg/min into the vein continuous.    nitroGLYCERIN (NITROSTAT) 0.4 MG SL tablet Place 1 tablet (0.4 mg total) under the tongue every 5 (five) minutes as needed for Chest pain. use up to three doses. if pain doesnt resolve, call 911    ondansetron (ZOFRAN-ODT) 8 MG TbDL Dissolve 1 tablet (8 mg total) by mouth every 8 (eight) hours as needed (nausea).    pantoprazole (PROTONIX) 40 MG tablet Take 1 tablet orally once a day.    polyethylene glycol (MIRALAX) 17 gram/dose powder Take 17 g by mouth once daily.    pregabalin (LYRICA) 50 MG capsule Take 1 capsule (50 mg total) by mouth every evening.    sertraline (ZOLOFT) 100 MG tablet Take 1 tablet (100 mg total) by mouth once daily.    amiodarone (PACERONE) 200 MG Tab Take 1 tablet orally once a day.    apixaban (ELIQUIS) 2.5 mg Tab Take 1 tablet (2.5 mg total) by mouth 2 (two) times daily.    blood-glucose sensor (DEXCOM G7 SENSOR) Evon change sensor every 10 days.    empagliflozin (JARDIANCE) 25 mg tablet Take 1 tablet orally once a day.    empagliflozin (JARDIANCE) 25 mg tablet Take 1 tablet orally once a day.    ergocalciferol (VITAMIN D2) 50,000 unit Cap Take 1 capsule orally once a  week    fentaNYL (DURAGESIC) 12 mcg/hr PT72 Place 1 patch onto the skin every 72 hours.    fluticasone propionate (FLONASE ALLERGY RELIEF) 50 mcg/actuation nasal spray Use 2 sprays (100 mcg total) by Each Nostril route once daily.    glimepiride (AMARYL) 1 MG tablet Take 1 tablet (1 mg total) by mouth before breakfast.    HYDROcodone-acetaminophen (NORCO)  mg per tablet Take 1 tablet by mouth every 6 (six) hours as needed for Pain.     isosorbide-hydrALAZINE 20-37.5 mg (BIDIL) 20-37.5 mg Tab Take 1 tablet by mouth 3 (three) times daily.    LIDOcaine (LIDODERM) 5 % Place 1 patch onto the skin once daily. Remove & discard patch within 12 hours or as directed by MD.    magnesium oxide (MAG-OX) 400 mg (241.3 mg magnesium) tablet Take 1 tablet (400 mg total) by mouth every evening.    magnesium oxide (MAG-OX) 400 mg (241.3 mg magnesium) tablet Take 1 tablet orally once a day.    metOLazone (ZAROXOLYN) 5 MG tablet Take 1 tablet orally once a day.    metOLazone (ZAROXOLYN) 5 MG tablet Take 1 tablet orally once a day.    metoprolol succinate (TOPROL-XL) 25 MG 24 hr tablet Take one-half tablet orally once a day.    nitroGLYCERIN (NITROSTAT) 0.4 MG SL tablet Put 1 tab under the tongue every 5 minutes x 3 doses as needed for chest pain. Do not exceed 3 doses in 15 minutes. If no relief, go to ER.    pantoprazole (PROTONIX) 40 MG tablet Take 1 tablet (40 mg total) by mouth once daily.    SPIRIVA WITH HANDIHALER 18 mcg inhalation capsule Inhale 1 capsule (18 mcg total) into the lungs once daily.    VENTOLIN HFA 90 mcg/actuation inhaler Inhale 2 puffs into the lungs every 6 (six) hours as needed for Shortness of Breath or Wheezing.     Family History       Problem Relation (Age of Onset)    Cancer Mother    Cirrhosis Brother    Diabetes Brother    Heart attack Father    Heart disease Father, Sister, Brother, Sister, Brother    Hypertension Father, Brother          Tobacco Use    Smoking status: Never    Smokeless tobacco: Never   Substance and Sexual Activity    Alcohol use: Not Currently     Comment: up to 1 yr ago drank 2-3 drinks on occasion but sporadic    Drug use: No    Sexual activity: Yes     Partners: Male     Review of Systems   Constitutional:  Positive for activity change and fatigue. Negative for chills and fever.   HENT:  Negative for nosebleeds and rhinorrhea.    Eyes:  Negative for photophobia, pain and redness.   Respiratory:  Positive for  cough, chest tightness and shortness of breath. Negative for apnea, wheezing and stridor.    Cardiovascular:  Negative for chest pain, palpitations and leg swelling.   Gastrointestinal:  Positive for abdominal pain. Negative for abdominal distention, blood in stool, diarrhea, nausea and vomiting.   Genitourinary:  Negative for difficulty urinating, dysuria and hematuria.   Musculoskeletal:  Positive for neck pain. Negative for arthralgias, back pain and neck stiffness.   Skin: Negative.    Neurological:  Positive for dizziness. Negative for syncope, speech difficulty and headaches.   Psychiatric/Behavioral:  Negative for agitation, behavioral problems and confusion.      Objective:     Vital Signs (Most Recent):  Temp: 98 °F (36.7 °C) (07/24/24 0400)  Pulse: 99 (07/24/24 0400)  Resp: 18 (07/24/24 0400)  BP: 105/75 (07/24/24 0400)  SpO2: 97 % (07/24/24 0400) Vital Signs (24h Range):  Temp:  [97.5 °F (36.4 °C)-98.2 °F (36.8 °C)] 98 °F (36.7 °C)  Pulse:  [] 99  Resp:  [16-22] 18  SpO2:  [93 %-100 %] 97 %  BP: ()/(64-88) 105/75     Weight: 81.9 kg (180 lb 8.9 oz)  Body mass index is 29.14 kg/m².     Physical Exam  Constitutional:       General: She is not in acute distress.     Appearance: Normal appearance. She is not ill-appearing.   HENT:      Head: Normocephalic and atraumatic.      Nose: Nose normal.      Mouth/Throat:      Mouth: Mucous membranes are moist.      Pharynx: Oropharynx is clear.   Eyes:      Extraocular Movements: Extraocular movements intact.      Conjunctiva/sclera: Conjunctivae normal.      Pupils: Pupils are equal, round, and reactive to light.   Cardiovascular:      Rate and Rhythm: Normal rate.      Pulses: Normal pulses.      Heart sounds: Normal heart sounds.   Pulmonary:      Effort: Pulmonary effort is normal.      Breath sounds: Normal breath sounds. No wheezing, rhonchi or rales.      Comments: On RA  Abdominal:      General: There is no distension.      Palpations: Abdomen is  soft. There is no mass.      Tenderness: There is abdominal tenderness (mildy in the epigastric region). There is no rebound.   Musculoskeletal:         General: No swelling, tenderness or deformity. Normal range of motion.      Cervical back: Normal range of motion and neck supple.   Skin:     General: Skin is warm and dry.      Capillary Refill: Capillary refill takes less than 2 seconds.   Neurological:      General: No focal deficit present.      Mental Status: She is alert and oriented to person, place, and time.   Psychiatric:         Mood and Affect: Mood normal.         Behavior: Behavior normal.              CRANIAL NERVES     CN III, IV, VI   Pupils are equal, round, and reactive to light.       Significant Labs: All pertinent labs within the past 24 hours have been reviewed.  Recent Lab Results         07/24/24  0433   07/24/24  0432   07/23/24  1838   07/23/24  1258        BNP 2,495  Comment: Values of less than 100 pg/ml are consistent with non-CHF populations.             Hematocrit   36.6           Hemoglobin   10.6           MCH   19.4           MCHC   29.0           MCV   67           RBC   5.46           RDW   31.5           Troponin I   1.617  Comment: The reference interval for Troponin I represents the 99th percentile   cutoff   for our facility and is consistent with 3rd generation assay   performance.     1.723  Comment: The reference interval for Troponin I represents the 99th percentile   cutoff   for our facility and is consistent with 3rd generation assay   performance.     1.755  Comment: The reference interval for Troponin I represents the 99th percentile   cutoff   for our facility and is consistent with 3rd generation assay   performance.         WBC   5.69                   Significant Imaging: I have reviewed all pertinent imaging results/findings within the past 24 hours.    Chest XR 7/22/24 FINDINGS:  Enlarged cardiac silhouette.  Left lung base is obscured.  Right basilar airspace  disease.  Right upper extremity PICC line, unchanged in position.  Left chest wall AICD.  Assessment/Plan:     * Chronic combined systolic and diastolic heart failure  Ms. Hafsa Hawley is a 57yo F with NICM s/p AICD placement on palliative dobutamine infusion, Afib (on amiodarone and eliquis), CKD, HTN, diabetes, presenting with a chief complaint of fatigue. She has a history of acute decompensated heart failure exacerbations and managed with GDMT. Most recent echo results shown below with an EF of 15%. The clinical picture during this admit does not fit a HF exacerbation. Her CXR shows cardiomegaly but does not highlight any major changes or increase in fluid volume. On exam, patient is in NAD, without any clinical signs of fluid overload. Lung examination was clear, no observable JVD, or lower extremity swelling. Her fatigue seems to be likely due to lack of rest secondary to her coughing episodes.     Echo    Result Date: 5/7/2024    Left Ventricle: The left ventricle is severely dilated. Severely   increased ventricular mass. Normal wall thickness. There is severe   eccentric hypertrophy. Severe global hypokinesis present. There is   severely reduced systolic function with a visually estimated ejection   fraction of 10 -15%. Grade II diastolic dysfunction. Elevated left   ventricular filling pressure. No thrombus present.    Right Ventricle: Normal right ventricular cavity size. Wall thickness   is normal. Right ventricle wall motion  is normal. Systolic function is   mildly reduced. Pacemaker lead present in the ventricle.    Left Atrium: Left atrium is severely dilated.    Right Atrium: Normal right atrial size.    Aortic Valve: The aortic valve is a trileaflet valve. There is mild   annular calcification present. There is mild aortic regurgitation.    Mitral Valve: There is moderate to severe regurgitation with an   eccentric jet and a wall hugging jet.    Tricuspid Valve: There is mild regurgitation.     Pulmonic Valve: There is mild regurgitation.    Aorta: Aortic root is normal in size measuring 2.75 cm. Ascending aorta   is normal measuring 3.49 cm.    Pulmonary Artery: The estimated pulmonary artery systolic pressure is   44 mmHg.    IVC/SVC: Normal venous pressure at 3 mmHg.          - Continue home metoprolol, jardiance, hydralazine with isosorbide dinitrate.   - Continue home bumetanide 4mg AM and 2mg PM.   - Continue home metolazone  - Weigh daily, can adjust diuretics as needed  - Strict I/Os  - Low sodium diet      Nausea  - PRN ondansetron ordered        Lower extremity neuropathy  - Continue home Lyrica      Hyperlipidemia, mixed  - Continue home atorvastatin        CKD (chronic kidney disease) stage 4, GFR 15-29 ml/min  - Creatine (at baseline) stable for now. BMP reviewed. Estimated Creatinine Clearance: 24.5 mL/min (A) (based on SCr of 2.7 mg/dL (H)). according to latest data. Based on current GFR, CKD stage is stage 4 - GFR 15-29.     - Monitor UOP and serial BMP and adjust therapy as needed. Renally dose meds. Avoid nephrotoxic medications and procedures.    Creatinine   Date Value Ref Range Status   07/22/2024 2.7 (H) 0.5 - 1.4 mg/dL Final     BUN   Date Value Ref Range Status   07/22/2024 77 (H) 6 - 20 mg/dL Final     eGFR   Date Value Ref Range Status   07/22/2024 20 (A) >60 mL/min/1.73 m^2 Final        Paroxysmal A-fib  History of atrial fibrillation. Stable.    - Continue home amiodarone  - Continue home eliquis  - Cardiac monitoring while inpatient    Gout, arthritis  - Continue home allopurinol      Type 2 diabetes mellitus with stage 4 chronic kidney disease, without long-term current use of insulin  Hemoglobin A1C   Date Value Ref Range Status   05/05/2024 5.6 4.0 - 5.6 % Final     Comment:     ADA Screening Guidelines:  5.7-6.4%  Consistent with prediabetes  >or=6.5%  Consistent with diabetes    High levels of fetal hemoglobin interfere with the HbA1C  assay. Heterozygous hemoglobin  variants (HbS, HgC, etc)do  not significantly interfere with this assay.   However, presence of multiple variants may affect accuracy.       - Continue Jardiance  - LDSSI  - Goal glucose of 140-180 while in patient    ICD (implantable cardioverter-defibrillator) in place  Primary prevention. See NICM for plan.       Depression due to physical illness  - Continue home sertraline      Cough  - promethazine-codeine given  - PRN benzonatate capsules       NICM (nonischemic cardiomyopathy)  History of non-ischemic cardiomyopathy s/p AICD placement on palliative dobutamine and not a candidate for advanced therapies. Patient currently pursuing heart transplant options.    - Continue dobutamine drip 5mcg/kg/min   - Patient follows palliative medicine outpatient      Essential hypertension  - Continue home meds as outlined in chronic combined systolic and diastolic heart failure.     Elevated troponin  Troponin I   Date Value Ref Range Status   07/24/2024 1.617 (H) 0.000 - 0.026 ng/mL Final     Comment:     The reference interval for Troponin I represents the 99th percentile   cutoff   for our facility and is consistent with 3rd generation assay   performance.     07/23/2024 1.723 (H) 0.000 - 0.026 ng/mL Final     Comment:     The reference interval for Troponin I represents the 99th percentile   cutoff   for our facility and is consistent with 3rd generation assay   performance.     07/23/2024 1.755 (H) 0.000 - 0.026 ng/mL Final     Comment:     The reference interval for Troponin I represents the 99th percentile   cutoff   for our facility and is consistent with 3rd generation assay   performance.       Patient's troponin elevated at baseline secondary to extensive cardiac history. Continue to monitor.        VTE Risk Mitigation (From admission, onward)           Ordered     apixaban tablet 2.5 mg  2 times daily         07/24/24 0435     IP VTE HIGH RISK PATIENT  Once         07/24/24 0341     Place sequential compression  device  Until discontinued         07/24/24 0341     Reason for No Pharmacological VTE Prophylaxis  Once        Question:  Reasons:  Answer:  Already adequately anticoagulated on oral Anticoagulants    07/24/24 0341                                    Emmanuel Beard MD  Department of Hospital Medicine  [facility] - Cardiology Stepdown

## 2024-07-24 NOTE — CONSULTS
Food & Nutrition  Education    Diet Education: Low sodium, Fluid restriction   Time Spent: 3 minutes   Learners: Pt     Nutrition Education provided with handouts:   Low sodium, fluid restriction discussed/ handouts left.    All questions and concerns answered. Dietitian's contact information provided.     Follow-Up: Yes     Please Re-consult as needed    Thanks!

## 2024-07-24 NOTE — SUBJECTIVE & OBJECTIVE
Past Medical History:   Diagnosis Date    Allergy     Anemia     Arthritis     Atrial fibrillation     OAC    BMI 32.0-32.9,adult 02/22/2023    Chronic respiratory failure with hypoxia, on home oxygen therapy     2L with activity, off at rest.  Per Pulm  no overt evidence of ILD or COPD on PFTs and CT to explain O2 needs.    CKD (chronic kidney disease), stage IV 05/08/2018    Congestive heart failure     s/p AICD placement,    Deep vein thrombosis     Depression     elevated bilirubin d/t Gilbert's syndrome     confirmed by [facility] genetic testing, evaluated by hepatology    Encounter for blood transfusion     GERD (gastroesophageal reflux disease)     Hypertension     Pheochromocytoma, malignant     Right kidney mass     Sleep apnea     Thalassemia trait, alpha     Thyroid disease     Type 2 diabetes mellitus with hyperglycemia, without long-term current use of insulin 08/13/2020       Past Surgical History:   Procedure Laterality Date    ANGIOGRAM, ABDOMINAL AORTA Right 04/15/2024    APPENDECTOMY      BONE MARROW BIOPSY      CARDIAC DEFIBRILLATOR PLACEMENT Left 12/2016    CARDIAC ELECTROPHYSIOLOGY MAPPING AND ABLATION      CARDIAC ELECTROPHYSIOLOGY MAPPING AND ABLATION      COLONOSCOPY N/A 05/06/2022    Procedure: COLONOSCOPY;  Surgeon: Arely Betancourt MD;  Location: [facility] ([address] [place] [address]);  Service: Endoscopy;  Laterality: N/A;  heart transplant candidate/ EF 25% - 2nd floor/ defib - Biotronik - [facility]  Eliquis - per Dr. Cortez with [facility], Pt ok to hold Eliquis x 2 days prior-see media tab-outside correspondence dated 12/30/21  - [facility]  verbal instructions/portal instructions/email instructions - s    EYE SURGERY      due to running tears    FRACTURE SURGERY Left     hand 5th digit    HYSTERECTOMY      KNEE SURGERY Left 2016    hematoma    LIVER BIOPSY  10/24/2018    Minimal steatosis, predominantly macrovesicular, 1%, Minimal nonspecific portal inflammation, no fibrosis. No findings on biopsy to explain elevated  "bilirubin levels. Could be d/t Gilbert's =?- hemolysis    RIGHT HEART CATHETERIZATION Right 12/07/2021    Procedure: INSERTION, CATHETER, RIGHT HEART;  Surgeon: Irving Cardenas MD;  Location: [facility] CATH LAB;  Service: Cardiology;  Laterality: Right;    RIGHT HEART CATHETERIZATION Right 12/19/2022    Procedure: INSERTION, CATHETER, RIGHT HEART;  Surgeon: Burke Camilo MD;  Location: [facility] CATH LAB;  Service: Cardiology;  Laterality: Right;    RIGHT HEART CATHETERIZATION Right 03/29/2023    Procedure: INSERTION, CATHETER, RIGHT HEART;  Surgeon: Katie Liriano DO;  Location: [facility] CATH LAB;  Service: Cardiology;  Laterality: Right;    TRANSJUGULAR BIOPSY OF LIVER N/A 10/24/2018    Procedure: BIOPSY, LIVER, TRANSJUGULAR APPROACH;  Surgeon: Carmen Diagnostic Provider;  Location: [facility] OR [address] [place] [address];  Service: Radiology;  Laterality: N/A;       Review of patient's allergies indicates:   Allergen Reactions    Penicillins Hives and Other (See Comments)    Iodinated contrast media Nausea And Vomiting    Oxycodone-acetaminophen Other (See Comments)     Nausea, Dizziness, Anxiety.  "I don't like how it makes me feel."   Given Hydromorphone 0.5mg IVP  Without problems.  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 Metabolism of other drugs not listed here may also be impacted by UGT1A1 enzyme activity.       Tramadol Nausea And Vomiting and Other (See Comments)     Other " reaction(s): Other (See Comments)    Valsartan Other (See Comments)       Current Facility-Administered Medications on File Prior to Encounter   Medication    0.9%  NaCl infusion    [COMPLETED] heparin, porcine (PF) 100 unit/mL injection flush 500 Units    [COMPLETED] ondansetron injection 4 mg    [DISCONTINUED] apixaban tablet 2.5 mg    [DISCONTINUED] atorvastatin tablet 40 mg    [DISCONTINUED] levalbuterol nebulizer solution 1.25 mg    [DISCONTINUED] pregabalin capsule 50 mg     Current Outpatient Medications on File Prior to Encounter   Medication Sig    acetaminophen (TYLENOL) 650 MG TbSR Take 1 tablet (650 mg total) by mouth every 8 (eight) hours.    albuterol-ipratropium (DUO-NEB) 2.5 mg-0.5 mg/3 mL nebulizer solution Take 3 mLs by nebulization every 6 (six) hours as needed for Wheezing or Shortness of Breath.    allopurinoL (ZYLOPRIM) 300 MG tablet Take 1 tablet (300 mg total) by mouth once daily.    ALPRAZolam (XANAX) 2 MG Tab Take 1 tablet orally 2 times a day.    amiodarone (PACERONE) 200 MG Tab take one tablet by mouth twice a day    apixaban (ELIQUIS) 2.5 mg Tab Take 1 tablet orally 2 times a day.    aspirin (ECOTRIN) 81 MG EC tablet Take 81 mg by mouth once daily.    atorvastatin (LIPITOR) 40 MG tablet Take 1 tablet (40 mg total) by mouth every evening.    atorvastatin (LIPITOR) 40 MG tablet Take 1 tablet orally once in the evening.    bumetanide (BUMEX) 2 MG tablet Take 1 tablet orally once a day.    cetirizine (ZYRTEC) 10 MG tablet Take 1 tablet (10 mg total) by mouth once daily.    dicyclomine (BENTYL) 20 mg tablet Take 1 tablet (20 mg total) by mouth 3 (three) times daily as needed (abd pain).    DOBUTamine (DOBUTREX) 1,000 mg/250 mL (4,000 mcg/mL) infusion Inject 409 mcg/min into the vein continuous.    nitroGLYCERIN (NITROSTAT) 0.4 MG SL tablet Place 1 tablet (0.4 mg total) under the tongue every 5 (five) minutes as needed for Chest pain. use up to three doses. if pain doesnt resolve, call 911     ondansetron (ZOFRAN-ODT) 8 MG TbDL Dissolve 1 tablet (8 mg total) by mouth every 8 (eight) hours as needed (nausea).    pantoprazole (PROTONIX) 40 MG tablet Take 1 tablet orally once a day.    polyethylene glycol (MIRALAX) 17 gram/dose powder Take 17 g by mouth once daily.    pregabalin (LYRICA) 50 MG capsule Take 1 capsule (50 mg total) by mouth every evening.    sertraline (ZOLOFT) 100 MG tablet Take 1 tablet (100 mg total) by mouth once daily.    amiodarone (PACERONE) 200 MG Tab Take 1 tablet orally once a day.    apixaban (ELIQUIS) 2.5 mg Tab Take 1 tablet (2.5 mg total) by mouth 2 (two) times daily.    blood-glucose sensor (DEXCOM G7 SENSOR) Evon change sensor every 10 days.    empagliflozin (JARDIANCE) 25 mg tablet Take 1 tablet orally once a day.    empagliflozin (JARDIANCE) 25 mg tablet Take 1 tablet orally once a day.    ergocalciferol (VITAMIN D2) 50,000 unit Cap Take 1 capsule orally once a  week    fentaNYL (DURAGESIC) 12 mcg/hr PT72 Place 1 patch onto the skin every 72 hours.    fluticasone propionate (FLONASE ALLERGY RELIEF) 50 mcg/actuation nasal spray Use 2 sprays (100 mcg total) by Each Nostril route once daily.    glimepiride (AMARYL) 1 MG tablet Take 1 tablet (1 mg total) by mouth before breakfast.    HYDROcodone-acetaminophen (NORCO)  mg per tablet Take 1 tablet by mouth every 6 (six) hours as needed for Pain.    isosorbide-hydrALAZINE 20-37.5 mg (BIDIL) 20-37.5 mg Tab Take 1 tablet by mouth 3 (three) times daily.    LIDOcaine (LIDODERM) 5 % Place 1 patch onto the skin once daily. Remove & discard patch within 12 hours or as directed by MD.    magnesium oxide (MAG-OX) 400 mg (241.3 mg magnesium) tablet Take 1 tablet (400 mg total) by mouth every evening.    magnesium oxide (MAG-OX) 400 mg (241.3 mg magnesium) tablet Take 1 tablet orally once a day.    metOLazone (ZAROXOLYN) 5 MG tablet Take 1 tablet orally once a day.    metOLazone (ZAROXOLYN) 5 MG tablet Take 1 tablet orally once a day.     metoprolol succinate (TOPROL-XL) 25 MG 24 hr tablet Take one-half tablet orally once a day.    nitroGLYCERIN (NITROSTAT) 0.4 MG SL tablet Put 1 tab under the tongue every 5 minutes x 3 doses as needed for chest pain. Do not exceed 3 doses in 15 minutes. If no relief, go to ER.    pantoprazole (PROTONIX) 40 MG tablet Take 1 tablet (40 mg total) by mouth once daily.    SPIRIVA WITH HANDIHALER 18 mcg inhalation capsule Inhale 1 capsule (18 mcg total) into the lungs once daily.    VENTOLIN HFA 90 mcg/actuation inhaler Inhale 2 puffs into the lungs every 6 (six) hours as needed for Shortness of Breath or Wheezing.     Family History       Problem Relation (Age of Onset)    Cancer Mother    Cirrhosis Brother    Diabetes Brother    Heart attack Father    Heart disease Father, Sister, Brother, Sister, Brother    Hypertension Father, Brother          Tobacco Use    Smoking status: Never    Smokeless tobacco: Never   Substance and Sexual Activity    Alcohol use: Not Currently     Comment: up to 1 yr ago drank 2-3 drinks on occasion but sporadic    Drug use: No    Sexual activity: Yes     Partners: Male     Review of Systems   Constitutional:  Positive for activity change and fatigue. Negative for chills and fever.   HENT:  Negative for nosebleeds and rhinorrhea.    Eyes:  Negative for photophobia, pain and redness.   Respiratory:  Positive for cough, chest tightness and shortness of breath. Negative for apnea, wheezing and stridor.    Cardiovascular:  Negative for chest pain, palpitations and leg swelling.   Gastrointestinal:  Positive for abdominal pain. Negative for abdominal distention, blood in stool, diarrhea, nausea and vomiting.   Genitourinary:  Negative for difficulty urinating, dysuria and hematuria.   Musculoskeletal:  Positive for neck pain. Negative for arthralgias, back pain and neck stiffness.   Skin: Negative.    Neurological:  Positive for dizziness. Negative for syncope, speech difficulty and headaches.    Psychiatric/Behavioral:  Negative for agitation, behavioral problems and confusion.      Objective:     Vital Signs (Most Recent):  Temp: 98 °F (36.7 °C) (07/24/24 0400)  Pulse: 99 (07/24/24 0400)  Resp: 18 (07/24/24 0400)  BP: 105/75 (07/24/24 0400)  SpO2: 97 % (07/24/24 0400) Vital Signs (24h Range):  Temp:  [97.5 °F (36.4 °C)-98.2 °F (36.8 °C)] 98 °F (36.7 °C)  Pulse:  [] 99  Resp:  [16-22] 18  SpO2:  [93 %-100 %] 97 %  BP: ()/(64-88) 105/75     Weight: 81.9 kg (180 lb 8.9 oz)  Body mass index is 29.14 kg/m².     Physical Exam  Constitutional:       General: She is not in acute distress.     Appearance: Normal appearance. She is not ill-appearing.   HENT:      Head: Normocephalic and atraumatic.      Nose: Nose normal.      Mouth/Throat:      Mouth: Mucous membranes are moist.      Pharynx: Oropharynx is clear.   Eyes:      Extraocular Movements: Extraocular movements intact.      Conjunctiva/sclera: Conjunctivae normal.      Pupils: Pupils are equal, round, and reactive to light.   Cardiovascular:      Rate and Rhythm: Normal rate.      Pulses: Normal pulses.      Heart sounds: Normal heart sounds.   Pulmonary:      Effort: Pulmonary effort is normal.      Breath sounds: Normal breath sounds. No wheezing, rhonchi or rales.      Comments: On RA  Abdominal:      General: There is no distension.      Palpations: Abdomen is soft. There is no mass.      Tenderness: There is abdominal tenderness (mildy in the epigastric region). There is no rebound.   Musculoskeletal:         General: No swelling, tenderness or deformity. Normal range of motion.      Cervical back: Normal range of motion and neck supple.   Skin:     General: Skin is warm and dry.      Capillary Refill: Capillary refill takes less than 2 seconds.   Neurological:      General: No focal deficit present.      Mental Status: She is alert and oriented to person, place, and time.   Psychiatric:         Mood and Affect: Mood normal.          Behavior: Behavior normal.              CRANIAL NERVES     CN III, IV, VI   Pupils are equal, round, and reactive to light.       Significant Labs: All pertinent labs within the past 24 hours have been reviewed.  Recent Lab Results         07/24/24  0433   07/24/24  0432   07/23/24  1838   07/23/24  1258        BNP 2,495  Comment: Values of less than 100 pg/ml are consistent with non-CHF populations.             Hematocrit   36.6           Hemoglobin   10.6           MCH   19.4           MCHC   29.0           MCV   67           RBC   5.46           RDW   31.5           Troponin I   1.617  Comment: The reference interval for Troponin I represents the 99th percentile   cutoff   for our facility and is consistent with 3rd generation assay   performance.     1.723  Comment: The reference interval for Troponin I represents the 99th percentile   cutoff   for our facility and is consistent with 3rd generation assay   performance.     1.755  Comment: The reference interval for Troponin I represents the 99th percentile   cutoff   for our facility and is consistent with 3rd generation assay   performance.         WBC   5.69                   Significant Imaging: I have reviewed all pertinent imaging results/findings within the past 24 hours.    Chest XR 7/22/24 FINDINGS:  Enlarged cardiac silhouette.  Left lung base is obscured.  Right basilar airspace disease.  Right upper extremity PICC line, unchanged in position.  Left chest wall AICD.

## 2024-07-24 NOTE — CONSULTS
[facility] Step[facility]  Palliative Medicine  Consult Note    Patient Name: Hafsa Hawley  MRN: [id number]  Admission Date: 7/24/2024  Hospital Length of Stay: 0 days  Code Status: Full Code   Attending Provider: Natalie De Jesus*  Consulting Provider: Sophia Franklin MD  Primary Care Physician: Cristobal Ann MD  Principal Problem:End stage heart failure    Patient information was obtained from patient and primary team.      Inpatient consult to Palliative Care  Consult performed by: Sophia Franklin MD  Consult ordered by: Ranulfo Cárdenas MD        Assessment/Plan:     Palliative Care  Palliative care encounter  57 y/o f, with end-stage heart failure, on a dobutamine infusion, admitted with worsening weakness, malaise, anorexia, nausea    Advance Care Planning    Medicolegal  Decision-making:   -Pt does have decision-making capacity  -Pt has appointed a HCPOA.  Patient has a document uploaded in epic.  She has chosen her daughter, Erika Estrada, to be her HCPOA and her  to be her 2nd  -Marital status:   -Children: 2 adult children    Advance care planning/Advance directives:  -The patient has previously engaged in advance care planning  -HCPOA in [employer]    Psychosocial  Support system:  -Spiritual: yes;  Patient is part of a particular bernabe background: [employer]  The palliative care  will be consulted to assess the patient.  -Family: seem involved and supportive though pt lives in [place] which is an hour away and suspect this is why no one is at bedside.  She is very close with her granddaughter who is 18 years old and she states that she visits her every single day when she is home.  She is also close with her daughter though she says her daughter has 6 children, is currently pregnant, has Graves disease and has a lot on her plate.    Health status prior to this hospitalization  -Functional status: fair.  Pt was able to manage ADLs though patient tells us that  recently she had become increasingly fatigued, less able and motivated to go outside.  She states that recently all she is wanted to do was laid down in bed and sleep  -Cognitive status: intact   -QOL: good - patient feels like she was still able to enjoy interactions with her family which bring her a lot of meaning    Prognosis:  -Time and potential for recovery:  Patient obviously with end-stage heart failure, high short-term mortality risk though she has been doing relatively well.  I do worry though given the symptoms that she is describing including anorexia, profound fatigue, nausea, anorexia, that she could in fact now be approaching the end of her life.  Patient aware of this as well.    [facility] Discussion with patient and Dr. Pichardo:  -I had a long and productive conversation with Ms. Hawley about her current situation.  She was able to explain to us why she ended up in the hospital.  She said that she was increasingly weak, wanting to sleep all the time, without any appetite, constant nausea.  She said that recently all she has wanted to do is stay in bed and sleep but that her  became worried and frustrated with her and convinced her to go to the hospital.  She said that it was actually her preference to not come back to the hospital that he ultimately forced her to go.  She is repeatedly questioning what could have happened recently to have triggered all of the symptoms, wondering out loud if it is a medication that she has taken or a recent fall that she had.  I shared my worry that her symptoms are likely secondary to her worsening heart failure and she was able to acknowledge that this could be the case.  There is a lot of tension in her because of what her body is wanting to do at this point in time (rest/sleep) and what she feels like she should be doing to support her family.  She has also largely tried to shield her family and friends from the severity of her diagnosis which has left her  increasingly isolated from them.  I explained that it would be reasonable to consider enrolling in hospice care upon discharge as this would allow her to stay at home with extra support and patient was definitely receptive to this discussion and possibility.  She seems worried about her family's acceptance of this plan however.    Active symptoms  -Pain: none at this time    -Constipation: well-controlled on bowel regimen    -Dyspnea mild.  Dr. Michel had recommended a low-dose fentanyl patch at the last visit but in discussion with the patient she never started it as she was worried about sedation.    -Depression:  Patient's mood does seem flat to me.  On review of Dr. Michel's most recent note, patient was supposed to be on Zoloft 100 mg daily but I do not see that she is getting that medication here.  Would restart.    -Anorexia:  Patient had actually eaten a decent amount of her lunch when I was in the room.  Suspect that anorexia secondary to her end-stage heart failure.  I do not recommend pharmacologic management of this condition.    Summary/Recommendation:  -Most important goals at this time: more discussions needed.  Patient seems tired and possibly more open to hospice enrollment though more discussions definitely needed, particularly with her family.  -Code status: FULL  -Most appropriate disposition: continue current plan          Thank you for your consult. I will follow-up with patient. Please contact us if you have any additional questions.    Subjective:     HPI:   57 y/o f, h/o NICM (EF 10%)  s/p AICD placement on palliative dobutamine infusion, Afib (on amiodarone and eliquis), CKD, HTN, diabetes, not a candidate for advanced options, followed in the [facility] outpatient palliative care clinic, transferred [facility] yesterday for decompensated heart failure.  Patient reports worsening fatigue, nausea, anorexia, shortness of breath for the past several days.  She states that she has  not wanted to do anything and has only wanted to stay in bed which has been very frustrating to her .  On arrival here her labs show an elevated BNP and troponin that are chronic for her.  Her creatinine is actually improved from her baseline.  She has been continued on her outpatient medication.    Hospital Course:  No notes on file        Past Medical History:   Diagnosis Date    Allergy     Anemia     Arthritis     Atrial fibrillation     OAC    BMI 32.0-32.9,adult 02/22/2023    Chronic respiratory failure with hypoxia, on home oxygen therapy     2L with activity, off at rest.  Per Pulm  no overt evidence of ILD or COPD on PFTs and CT to explain O2 needs.    CKD (chronic kidney disease), stage IV 05/08/2018    Congestive heart failure     s/p AICD placement,    Deep vein thrombosis     Depression     elevated bilirubin d/t Gilbert's syndrome     confirmed by [place] genetic testing, evaluated by hepatology    Encounter for blood transfusion     GERD (gastroesophageal reflux disease)     Hypertension     Pheochromocytoma, malignant     Right kidney mass     Sleep apnea     Thalassemia trait, alpha     Thyroid disease     Type 2 diabetes mellitus with hyperglycemia, without long-term current use of insulin 08/13/2020       Past Surgical History:   Procedure Laterality Date    ANGIOGRAM, ABDOMINAL AORTA Right 04/15/2024    APPENDECTOMY      BONE MARROW BIOPSY      CARDIAC DEFIBRILLATOR PLACEMENT Left 12/2016    CARDIAC ELECTROPHYSIOLOGY MAPPING AND ABLATION      CARDIAC ELECTROPHYSIOLOGY MAPPING AND ABLATION      COLONOSCOPY N/A 05/06/2022    Procedure: COLONOSCOPY;  Surgeon: Arely Betancourt MD;  Location: [facility] ([address] [place] [address]);  Service: Endoscopy;  Laterality: N/A;  heart transplant candidate/ EF 25% - 2nd floor/ defib - Biotronik - [facility]  Eliquis - per Dr. Cortez with [facility], Pt ok to hold Eliquis x 2 days prior-see media tab-outside correspondence dated 12/30/21  - [facility]  verbal instructions/portal  "instructions/email instructions - s    EYE SURGERY      due to running tears    FRACTURE SURGERY Left     hand 5th digit    HYSTERECTOMY      KNEE SURGERY Left 2016    hematoma    LIVER BIOPSY  10/24/2018    Minimal steatosis, predominantly macrovesicular, 1%, Minimal nonspecific portal inflammation, no fibrosis. No findings on biopsy to explain elevated bilirubin levels. Could be d/t Gilbert's =?- hemolysis    RIGHT HEART CATHETERIZATION Right 12/07/2021    Procedure: INSERTION, CATHETER, RIGHT HEART;  Surgeon: Irving Cardenas MD;  Location: [facility] CATH LAB;  Service: Cardiology;  Laterality: Right;    RIGHT HEART CATHETERIZATION Right 12/19/2022    Procedure: INSERTION, CATHETER, RIGHT HEART;  Surgeon: Burke Camilo MD;  Location: [facility] CATH LAB;  Service: Cardiology;  Laterality: Right;    RIGHT HEART CATHETERIZATION Right 03/29/2023    Procedure: INSERTION, CATHETER, RIGHT HEART;  Surgeon: Katie Liriano DO;  Location: [facility] CATH LAB;  Service: Cardiology;  Laterality: Right;    TRANSJUGULAR BIOPSY OF LIVER N/A 10/24/2018    Procedure: BIOPSY, LIVER, TRANSJUGULAR APPROACH;  Surgeon: Carmen Diagnostic Provider;  [facility];  Service: Radiology;  Laterality: N/A;       Review of patient's allergies indicates:   Allergen Reactions    Penicillins Hives and Other (See Comments)    Iodinated contrast media Nausea And Vomiting    Oxycodone-acetaminophen Other (See Comments)     Nausea, Dizziness, Anxiety.  "I don't like how it makes me feel."   Given Hydromorphone 0.5mg IVP  Without problems.  Other reaction(s): Other (See Comments)    Clonazepam Other (See Comments)    Diovan hct [valsartan-hydrochlorothiazide] Other (See Comments)     Causes coughing    Iodine Other (See Comments)    Irinotecan      Pt has homozygosity for the TA7 promoter variant that places this individual at significantly increased risk for   severe neutropenia (grade 4) when treated with the standard dose of irinotecan " (risk approximately 50%).   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 Metabolism of other drugs not listed here may also be impacted by UGT1A1 enzyme activity.       Tramadol Nausea And Vomiting and Other (See Comments)     Other reaction(s): Other (See Comments)    Valsartan Other (See Comments)       Medications:  Continuous Infusions:   DOBUTamine IV infusion (non-titrating)  5 mcg/kg/min Intravenous Continuous 6.1 mL/hr at 07/24/24 0512 5 mcg/kg/min at 07/24/24 0512     Scheduled Meds:   allopurinoL  300 mg Oral Daily    amiodarone  200 mg Oral Daily    apixaban  2.5 mg Oral BID    aspirin  81 mg Oral Daily    atorvastatin  40 mg Oral QHS    bumetanide  2 mg Oral QHS    bumetanide  4 mg Oral Daily    empagliflozin  10 mg Oral Daily    hydrALAZINE 10 mg 1 tablet, hydrALAZINE 25 mg 1 tablet, isorsorbide dinitrate 20 mg 1 tablet combination   Oral TID    magnesium oxide  400 mg Oral QHS    metOLazone  5 mg Oral Daily    pantoprazole  40 mg Oral Daily    polyethylene glycol  17 g Oral Daily    pregabalin  50 mg Oral QHS    tiotropium bromide  2 puff Inhalation Daily     PRN Meds:  Current Facility-Administered Medications:     acetaminophen, 650 mg, Oral, Q6H PRN    benzonatate, 100 mg, Oral, TID PRN    dextrose 10%, 12.5 g, Intravenous, PRN    dextrose 10%, 25 g, Intravenous, PRN    dicyclomine, 20 mg, Oral, TID PRN    glucagon (human recombinant), 1 mg, Intramuscular, PRN    glucose, 16 g, Oral, PRN    glucose, 24 g, Oral, PRN    HYDROmorphone, 0.5 mg, Intravenous, Q4H PRN    insulin aspart U-100, 0-5 Units, Subcutaneous, QID (AC + HS) PRN    ipratropium, 0.5 mg, Nebulization, Q4H PRN    naloxone, 0.02 mg, Intravenous, PRN    ondansetron, 4 mg, Intravenous, Q6H PRN    promethazine-codeine 6.25-10 mg/5 ml, 10 mL, Oral, Q6H PRN    sodium chloride 0.9%, 10 mL, Intravenous, Q12H PRN    sodium  chloride 0.9%, 10 mL, Intravenous, PRN    Family History       Problem Relation (Age of Onset)    Cancer Mother    Cirrhosis Brother    Diabetes Brother    Heart attack Father    Heart disease Father, Sister, Brother, Sister, Brother    Hypertension Father, Brother          Tobacco Use    Smoking status: Never    Smokeless tobacco: Never   Substance and Sexual Activity    Alcohol use: Not Currently     Comment: up to 1 yr ago drank 2-3 drinks on occasion but sporadic    Drug use: No    Sexual activity: Yes     Partners: Male       Review of Systems   Constitutional:  Positive for activity change, appetite change and fatigue.   Respiratory:  Positive for shortness of breath.    Gastrointestinal:  Positive for nausea. Negative for constipation, diarrhea and vomiting.   Neurological:  Positive for weakness.   Psychiatric/Behavioral:  Positive for sleep disturbance.      Objective:     Vital Signs (Most Recent):  Temp: 98 °F (36.7 °C) (07/24/24 1112)  Pulse: 97 (07/24/24 1112)  Resp: 20 (07/24/24 1411)  BP: 111/76 (07/24/24 1112)  SpO2: 95 % (07/24/24 1112) Vital Signs (24h Range):  Temp:  [97.5 °F (36.4 °C)-98.2 °F (36.8 °C)] 98 °F (36.7 °C)  Pulse:  [] 97  Resp:  [16-22] 20  SpO2:  [93 %-100 %] 95 %  BP: ()/(64-90) 111/76     Weight: 81.9 kg (180 lb 8.9 oz)  Body mass index is 29.14 kg/m².       Physical Exam  Vitals and nursing note reviewed. Exam conducted with a chaperone present.   Constitutional:       General: She is awake.      Appearance: She is not toxic-appearing.      Comments: Pt sitting in recliner   Eyes:      Conjunctiva/sclera: Conjunctivae normal.   Pulmonary:      Effort: Pulmonary effort is normal.   Musculoskeletal:         General: No swelling.   Skin:     General: Skin is warm.   Neurological:      Comments: Awake, engaged, able to answer all of our questions appropriately   Psychiatric:         Attention and Perception: Attention normal.         Mood and Affect: Mood is depressed.  "Affect is blunt.         Speech: Speech normal.            Review of Symptoms      Symptom Assessment (ESAS 0-10 Scale)  Pain:  0  Dyspnea:  0  Anxiety:  0  Nausea:  5  Depression:  0  Anorexia:  5  Fatigue:  8  Insomnia:  0  Restlessness:  0  Agitation:  0       Constipation:  No constipation    Psychosocial/Cultural:   See Palliative Psychosocial Note: Yes  **Primary  to Follow**  Palliative Care  Consult: Yes        Advance Care Planning  Advance Directives:   Living Will: No    LaPOST: No    Do Not Resuscitate Status: No    Medical Power of : Yes      Decision Making:  Patient answered questions  Goals of Care: What is most important right now is to focus on curative/life-prolongation (regardless of treatment burdens), remaining as independent as possible, symptom/pain control. Accordingly, we have decided that the best plan to meet the patient's goals includes continuing with treatment.         CBC:   Recent Labs   Lab 07/24/24  0432   WBC 5.69   HGB 10.6*   HCT 36.6*   MCV 67*        BMP:  No results for input(s): "GLU", "NA", "K", "CL", "CO2", "BUN", "CREATININE", "CALCIUM", "MG" in the last 24 hours.  LFT:  Lab Results   Component Value Date    AST 20 07/22/2024    ALKPHOS 51 (L) 07/22/2024    BILITOT 2.4 (H) 07/22/2024     Albumin:   Albumin   Date Value Ref Range Status   07/22/2024 3.7 3.5 - 5.2 g/dL Final     Protein:   Total Protein   Date Value Ref Range Status   07/22/2024 6.3 6.0 - 8.4 g/dL Final     Lactic acid:   Lab Results   Component Value Date    LACTATE 2.1 06/27/2024    LACTATE 1.1 05/05/2024         I spent a total of 60 minutes on the day of the visit. This includes face to face time in discussion of goals of care, symptom assessment, coordination of care and emotional support.  This also includes non-face to face time preparing to see the patient (eg, review of tests/imaging), obtaining and/or reviewing separately obtained history, documenting " clinical information in the electronic or other health record, independently interpreting results and communicating results to the patient/family/caregiver, or care coordinator.    Sophia Franklin MD  Palliative Medicine  [facility] - Cardiology Stepdown

## 2024-07-24 NOTE — H&P
Patient is a 58 year old female with h/o advanced DICM (EF 10-15%) s/p AICD, on palliative home dobutamine 5 mcg/kg/min as not a candidate for advanced interventions, chronic cough and dyspnea with prn home oxygen, afib on eliquis, CKD IV (baseline Cr ~3), anxiety, depression who presents as a transfer from [facility] ER for management of worsening fatigue and cough. She also reports associated chest tightness and lack of sleep due to lingering cough. Patient states she ran out of promethazine about a month ago which worked well for her cough and has been taking robitussin since then w/o improvement. She reports compliant with her medications including GDMT for CHF, dobutamine via R arm PICC and diuretics (bumex + metolazone). Patient received lasix 80 mg IV prior to transfer w/o significant urine output.     Patient is afebrile, awake, conversant, chronically ill appearing but not in distress during my interview. Oxygenating well on room air. Appears euvolemic on exam with warm lower extremities. I think her acute problem is uncontrolled cough >> lack of rest and sleep >> exhaustion/fatigue. Will start on promethazine with codeine for cough and monitor. Continue home GDMT and  infusion. Patient is FULL code.

## 2024-07-24 NOTE — SUBJECTIVE & OBJECTIVE
Past Medical History:   Diagnosis Date    Allergy     Anemia     Arthritis     Atrial fibrillation     OAC    BMI 32.0-32.9,adult 02/22/2023    Chronic respiratory failure with hypoxia, on home oxygen therapy     2L with activity, off at rest.  Per Pulm  no overt evidence of ILD or COPD on PFTs and CT to explain O2 needs.    CKD (chronic kidney disease), stage IV 05/08/2018    Congestive heart failure     s/p AICD placement,    Deep vein thrombosis     Depression     elevated bilirubin d/t Gilbert's syndrome     confirmed by [place] genetic testing, evaluated by hepatology    Encounter for blood transfusion     GERD (gastroesophageal reflux disease)     Hypertension     Pheochromocytoma, malignant     Right kidney mass     Sleep apnea     Thalassemia trait, alpha     Thyroid disease     Type 2 diabetes mellitus with hyperglycemia, without long-term current use of insulin 08/13/2020       Past Surgical History:   Procedure Laterality Date    ANGIOGRAM, ABDOMINAL AORTA Right 04/15/2024    APPENDECTOMY      BONE MARROW BIOPSY      CARDIAC DEFIBRILLATOR PLACEMENT Left 12/2016    CARDIAC ELECTROPHYSIOLOGY MAPPING AND ABLATION      CARDIAC ELECTROPHYSIOLOGY MAPPING AND ABLATION      COLONOSCOPY N/A 05/06/2022    Procedure: COLONOSCOPY;  Surgeon: Arely Betancourt MD;  Location: [facility] ([address] [place] [address]);  Service: Endoscopy;  Laterality: N/A;  heart transplant candidate/ EF 25% - 2nd floor/ defib - Biotronik - ERW  Eliquis - per Dr. Cortez with [facility] [place], Pt ok to hold Eliquis x 2 days prior-see media tab-outside correspondence dated 12/30/21  - ERW  verbal instructions/portal instructions/email instructions - s    EYE SURGERY      due to running tears    FRACTURE SURGERY Left     hand 5th digit    HYSTERECTOMY      KNEE SURGERY Left 2016    hematoma    LIVER BIOPSY  10/24/2018    Minimal steatosis, predominantly macrovesicular, 1%, Minimal nonspecific portal inflammation, no fibrosis. No findings on biopsy to explain elevated  "bilirubin levels. Could be d/t Gilbert's =?- hemolysis    RIGHT HEART CATHETERIZATION Right 12/07/2021    Procedure: INSERTION, CATHETER, RIGHT HEART;  Surgeon: Irving Cardenas MD;  Location: [facility] CATH LAB;  Service: Cardiology;  Laterality: Right;    RIGHT HEART CATHETERIZATION Right 12/19/2022    Procedure: INSERTION, CATHETER, RIGHT HEART;  Surgeon: Burke Camilo MD;  Location: [facility] CATH LAB;  Service: Cardiology;  Laterality: Right;    RIGHT HEART CATHETERIZATION Right 03/29/2023    Procedure: INSERTION, CATHETER, RIGHT HEART;  Surgeon: Katie Liriano DO;  Location: [facility] CATH LAB;  Service: Cardiology;  Laterality: Right;    TRANSJUGULAR BIOPSY OF LIVER N/A 10/24/2018    Procedure: BIOPSY, LIVER, TRANSJUGULAR APPROACH;  Surgeon: Carmen Diagnostic Provider;  Location: [facility] OR [address] [place] [address];  Service: Radiology;  Laterality: N/A;       Review of patient's allergies indicates:   Allergen Reactions    Penicillins Hives and Other (See Comments)    Iodinated contrast media Nausea And Vomiting    Oxycodone-acetaminophen Other (See Comments)     Nausea, Dizziness, Anxiety.  "I don't like how it makes me feel."   Given Hydromorphone 0.5mg IVP  Without problems.  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 Metabolism of other drugs not listed here may also be impacted by UGT1A1 enzyme activity.       Tramadol Nausea And Vomiting and Other (See Comments)     Other " reaction(s): Other (See Comments)    Valsartan Other (See Comments)       Medications:  Continuous Infusions:   DOBUTamine IV infusion (non-titrating)  5 mcg/kg/min Intravenous Continuous 6.1 mL/hr at 07/24/24 0512 5 mcg/kg/min at 07/24/24 0512     Scheduled Meds:   allopurinoL  300 mg Oral Daily    amiodarone  200 mg Oral Daily    apixaban  2.5 mg Oral BID    aspirin  81 mg Oral Daily    atorvastatin  40 mg Oral QHS    bumetanide  2 mg Oral QHS    bumetanide  4 mg Oral Daily    empagliflozin  10 mg Oral Daily    hydrALAZINE 10 mg 1 tablet, hydrALAZINE 25 mg 1 tablet, isorsorbide dinitrate 20 mg 1 tablet combination   Oral TID    magnesium oxide  400 mg Oral QHS    metOLazone  5 mg Oral Daily    pantoprazole  40 mg Oral Daily    polyethylene glycol  17 g Oral Daily    pregabalin  50 mg Oral QHS    tiotropium bromide  2 puff Inhalation Daily     PRN Meds:  Current Facility-Administered Medications:     acetaminophen, 650 mg, Oral, Q6H PRN    benzonatate, 100 mg, Oral, TID PRN    dextrose 10%, 12.5 g, Intravenous, PRN    dextrose 10%, 25 g, Intravenous, PRN    dicyclomine, 20 mg, Oral, TID PRN    glucagon (human recombinant), 1 mg, Intramuscular, PRN    glucose, 16 g, Oral, PRN    glucose, 24 g, Oral, PRN    HYDROmorphone, 0.5 mg, Intravenous, Q4H PRN    insulin aspart U-100, 0-5 Units, Subcutaneous, QID (AC + HS) PRN    ipratropium, 0.5 mg, Nebulization, Q4H PRN    naloxone, 0.02 mg, Intravenous, PRN    ondansetron, 4 mg, Intravenous, Q6H PRN    promethazine-codeine 6.25-10 mg/5 ml, 10 mL, Oral, Q6H PRN    sodium chloride 0.9%, 10 mL, Intravenous, Q12H PRN    sodium chloride 0.9%, 10 mL, Intravenous, PRN    Family History       Problem Relation (Age of Onset)    Cancer Mother    Cirrhosis Brother    Diabetes Brother    Heart attack Father    Heart disease Father, Sister, Brother, Sister, Brother    Hypertension Father, Brother          Tobacco Use    Smoking status: Never    Smokeless tobacco: Never   Substance and  Sexual Activity    Alcohol use: Not Currently     Comment: up to 1 yr ago drank 2-3 drinks on occasion but sporadic    Drug use: No    Sexual activity: Yes     Partners: Male       Review of Systems   Constitutional:  Positive for activity change, appetite change and fatigue.   Respiratory:  Positive for shortness of breath.    Gastrointestinal:  Positive for nausea. Negative for constipation, diarrhea and vomiting.   Neurological:  Positive for weakness.   Psychiatric/Behavioral:  Positive for sleep disturbance.      Objective:     Vital Signs (Most Recent):  Temp: 98 °F (36.7 °C) (07/24/24 1112)  Pulse: 97 (07/24/24 1112)  Resp: 20 (07/24/24 1411)  BP: 111/76 (07/24/24 1112)  SpO2: 95 % (07/24/24 1112) Vital Signs (24h Range):  Temp:  [97.5 °F (36.4 °C)-98.2 °F (36.8 °C)] 98 °F (36.7 °C)  Pulse:  [] 97  Resp:  [16-22] 20  SpO2:  [93 %-100 %] 95 %  BP: ()/(64-90) 111/76     Weight: 81.9 kg (180 lb 8.9 oz)  Body mass index is 29.14 kg/m².       Physical Exam  Vitals and nursing note reviewed. Exam conducted with a chaperone present.   Constitutional:       General: She is awake.      Appearance: She is not toxic-appearing.      Comments: Pt sitting in recliner   Eyes:      Conjunctiva/sclera: Conjunctivae normal.   Pulmonary:      Effort: Pulmonary effort is normal.   Musculoskeletal:         General: No swelling.   Skin:     General: Skin is warm.   Neurological:      Comments: Awake, engaged, able to answer all of our questions appropriately   Psychiatric:         Attention and Perception: Attention normal.         Mood and Affect: Mood is depressed. Affect is blunt.         Speech: Speech normal.            Review of Symptoms      Symptom Assessment (ESAS 0-10 Scale)  Pain:  0  Dyspnea:  0  Anxiety:  0  Nausea:  5  Depression:  0  Anorexia:  5  Fatigue:  8  Insomnia:  0  Restlessness:  0  Agitation:  0       Constipation:  No constipation    Psychosocial/Cultural:   See Palliative Psychosocial Note:  "Yes  **Primary  to Follow**  Palliative Care  Consult: Yes        Advance Care Planning   Advance Directives:   Living Will: No    LaPOST: No    Do Not Resuscitate Status: No    Medical Power of : Yes      Decision Making:  Patient answered questions  Goals of Care: What is most important right now is to focus on curative/life-prolongation (regardless of treatment burdens), remaining as independent as possible, symptom/pain control. Accordingly, we have decided that the best plan to meet the patient's goals includes continuing with treatment.         CBC:   Recent Labs   Lab 07/24/24  0432   WBC 5.69   HGB 10.6*   HCT 36.6*   MCV 67*        BMP:  No results for input(s): "GLU", "NA", "K", "CL", "CO2", "BUN", "CREATININE", "CALCIUM", "MG" in the last 24 hours.  LFT:  Lab Results   Component Value Date    AST 20 07/22/2024    ALKPHOS 51 (L) 07/22/2024    BILITOT 2.4 (H) 07/22/2024     Albumin:   Albumin   Date Value Ref Range Status   07/22/2024 3.7 3.5 - 5.2 g/dL Final     Protein:   Total Protein   Date Value Ref Range Status   07/22/2024 6.3 6.0 - 8.4 g/dL Final     Lactic acid:   Lab Results   Component Value Date    LACTATE 2.1 06/27/2024    LACTATE 1.1 05/05/2024       "

## 2024-07-24 NOTE — ASSESSMENT & PLAN NOTE
History of atrial fibrillation. Stable.    - Continue home amiodarone  - Continue home eliquis  - Cardiac monitoring while inpatient

## 2024-07-24 NOTE — HPI
57 y/o f, h/o NICM (EF 10%)  s/p AICD placement on palliative dobutamine infusion, Afib (on amiodarone and eliquis), CKD, HTN, diabetes, not a candidate for advanced options, followed in the [facility] outpatient palliative care clinic, transferred to [facility] from [facility] yesterday for decompensated heart failure.  Patient reports worsening fatigue, nausea, anorexia, shortness of breath for the past several days.  She states that she has not wanted to do anything and has only wanted to stay in bed which has been very frustrating to her .  On arrival here her labs show an elevated BNP and troponin that are chronic for her.  Her creatinine is actually improved from her baseline.  She has been continued on her outpatient medication.

## 2024-07-24 NOTE — ASSESSMENT & PLAN NOTE
57 y/o f, with end-stage heart failure, on a dobutamine infusion, admitted with worsening weakness, malaise, anorexia, nausea    Advance Care Planning     Medicolegal  Decision-making:   -Pt does have decision-making capacity  -Pt has appointed a HCPOA.  Patient has a document uploaded in epic.  She has chosen her daughter, Erika Estrada, to be her HCPOA and her  to be her 2nd  -Marital status:   -Children: 2 adult children    Advance care planning/Advance directives:  -The patient has previously engaged in advance care planning  -HCPOA in [employer]    Psychosocial  Support system:  -Spiritual: yes;  Patient is part of a particular bernabe background: [employer]  The palliative care  will be consulted to assess the patient.  -Family: seem involved and supportive though pt lives in [place] which is an hour away and suspect this is why no one is at bedside.  She is very close with her granddaughter who is 18 years old and she states that she visits her every single day when she is home.  She is also close with her daughter though she says her daughter has 6 children, is currently pregnant, has Graves disease and has a lot on her plate.    Health status prior to this hospitalization  -Functional status: fair.  Pt was able to manage ADLs though patient tells us that recently she had become increasingly fatigued, less able and motivated to go outside.  She states that recently all she is wanted to do was laid down in bed and sleep  -Cognitive status: intact   -QOL: good - patient feels like she was still able to enjoy interactions with her family which bring her a lot of meaning    Prognosis:  -Time and potential for recovery:  Patient obviously with end-stage heart failure, high short-term mortality risk though she has been doing relatively well.  I do worry though given the symptoms that she is describing including anorexia, profound fatigue, nausea, anorexia, that she could in fact now be approaching  the end of her life.  Patient aware of this as well.    [facility] Discussion with patient and Dr. Pichardo:  -I had a long and productive conversation with Ms. Hawley about her current situation.  She was able to explain to us why she ended up in the hospital.  She said that she was increasingly weak, wanting to sleep all the time, without any appetite, constant nausea.  She said that recently all she has wanted to do is stay in bed and sleep but that her  became worried and frustrated with her and convinced her to go to the hospital.  She said that it was actually her preference to not come back to the hospital that he ultimately forced her to go.  She is repeatedly questioning what could have happened recently to have triggered all of the symptoms, wondering out loud if it is a medication that she has taken or a recent fall that she had.  I shared my worry that her symptoms are likely secondary to her worsening heart failure and she was able to acknowledge that this could be the case.  There is a lot of tension in her because of what her body is wanting to do at this point in time (rest/sleep) and what she feels like she should be doing to support her family.  She has also largely tried to shield her family and friends from the severity of her diagnosis which has left her increasingly isolated from them.  I explained that it would be reasonable to consider enrolling in hospice care upon discharge as this would allow her to stay at home with extra support and patient was definitely receptive to this discussion and possibility.  She seems worried about her family's acceptance of this plan however.    Active symptoms  -Pain: none at this time    -Constipation: well-controlled on bowel regimen    -Dyspnea mild.  Dr. Michel had recommended a low-dose fentanyl patch at the last visit but in discussion with the patient she never started it as she was worried about sedation.    -Depression:  Patient's mood does seem flat to  me.  On review of Dr. Michel's most recent note, patient was supposed to be on Zoloft 100 mg daily but I do not see that she is getting that medication here.  Would restart.    -Anorexia:  Patient had actually eaten a decent amount of her lunch when I was in the room.  Suspect that anorexia secondary to her end-stage heart failure.  I do not recommend pharmacologic management of this condition.    Summary/Recommendation:  -Most important goals at this time: more discussions needed.  Patient seems tired and possibly more open to hospice enrollment though more discussions definitely needed, particularly with her family.  -Code status: FULL  -Most appropriate disposition: continue current plan

## 2024-07-24 NOTE — PROGRESS NOTES
Heart Failure Transitional Care [facility] (HFTCC) Team notified of pt referral via [facility] (automated inbasket notification) .    Patient screened today 7/24/2024 by provider and LPN for enrollment to program.      Pt was deemed not a candidate for enrollment at this time related to patient is located outside of [facility] area and will need to follow up with local services.  Heart Failure Transitional Care Clinic ([id number]) is [facility] location only.      Pt will require additional follow up planning per primary team.     If pt status, diagnosis, or treatment plan changes , please place AMB referral to Heart Failure Transitional Care Clinic ([id number]) for HFTCC enrollment re-evalution.

## 2024-07-24 NOTE — ASSESSMENT & PLAN NOTE
History of non-ischemic cardiomyopathy s/p AICD placement on palliative dobutamine and not a candidate for advanced therapies. Patient currently pursuing heart transplant options.    - Continue dobutamine drip 5mcg/kg/min   - Patient follows palliative medicine outpatient

## 2024-07-24 NOTE — CARE UPDATE
Unit [facility] Care Support Interaction      I have reviewed the chart of Hafsa Hawley who is hospitalized for Chronic combined systolic and diastolic heart failure. The patient is currently located in the following unit: CSU      I have assisted the primary physician in management of the following:      Central Line - Present on admission to the unit - ordered blood cultures       Argenis Mcmillan NP  Unit Based SAMREEN

## 2024-07-24 NOTE — ACP (ADVANCE CARE PLANNING)
Today (7/24/2024) a meeting regarding ACP & GOC took place at bedside     Pt Participation: Full/Self     Attendees (Name & Relationship to Pt): N/A     Staff Attendees (Name & Role): Andrea De La Cruz MD (resident physician)      ACP Conversation (General): I engaged the pt & family about the pt's preferences in the event their heart were to stop &/or they were to code during this hospitalization.  They expressed their desire to have everything done to prolong their life, including CPR, chest compressions, &/or intubation/mechanical ventilation.  They expressed their desire to remain Full Code in accordance to their wishes.    She has been f/b Palliative Care outpt. She was previously f/b HTS & is on Palliative dobutamine gtt.    Medical Proxy/MPOA: In the event that the pt cannot make medical decisions for themselves, pt's Sibling (Jaye) will be the one(s) to make medical decisions on their behalf.       Code Status: Full Code     GOC: Pt &/or family expressed their desire to improve/maintain quality of life as a priority.    Andrea De La Cruz MD  [facility] Medicine, [facility], PGY-3

## 2024-07-24 NOTE — PLAN OF CARE
[facility] - Cardiology Stepdown  Initial Discharge Assessment       Primary Care Provider: Cristobal Ann MD    Admission Diagnosis: Acute decompensated heart failure [I50.9]    Admission Date: 7/24/2024  Expected Discharge Date: 7/29/2024    Transition of Care Barriers: Underinsured    Payor: MEDICAID / Plan: [employer] [facility] ([employer]) / Product Type: Managed Medicaid /     Extended Emergency Contact Information  Primary Emergency Contact: Jaye Baum  Address: [address] [place] [address] [facility]  Home Phone: [phone]  Relation: Sister  Secondary Emergency Contact: ChandanJerrod  Mobile Phone: [phone]  Relation: Spouse    Discharge Plan A: Home with family, Home Health  Discharge Plan B: Home with family, Hospice/home      [facility] [address] [place] [address]  Phone: [phone] Fax: [phone]    [facility]  [address]  [facility] [address]  Phone: [phone] Fax: [phone]      Initial Assessment (most recent)       Adult Discharge Assessment - 07/24/24 1230          Discharge Assessment    Assessment Type Discharge Planning Assessment     Confirmed/corrected address, phone number and insurance Yes     Confirmed Demographics Correct on Facesheet     Source of Information patient     If unable to respond/provide information was family/caregiver contacted? Yes     Contact Name/Number Ivan Hawley- [phone]     Communicated CHIQUI with patient/caregiver Yes     Reason For Admission End stage heart failure     People in Home spouse     Facility Arrived From: [facility]     Do you expect to return to your current living situation? Yes     Do you have help at home or someone to help you manage your care at home? Yes     Who are your caregiver(s) and their phone number(s)? Ivan Hawley- [phone]     Prior to hospitilization cognitive status: Alert/Oriented     Current  cognitive status: Alert/Oriented     Walking or Climbing Stairs Difficulty yes     Walking or Climbing Stairs ambulation difficulty, requires equipment     Mobility Management rollator     Dressing/Bathing Difficulty no     Do you have any problems with: --   Spouse- Delbert Hawley- [phone]    Home Accessibility stairs to enter home     Number of Stairs, Main Entrance five     Surface of Stairs, Main Entrance hardwood     Stair Railings, Main Entrance railings safe and in good condition     Landing, Stairs, Main Entrance railings present     Stairs Comment, Main Entrance Five     Home Layout Able to live on 1st floor     Equipment Currently Used at Home nebulizer;oxygen;rollator;walker, standard     Readmission within 30 days? No     Patient currently being followed by outpatient case management? No     Do you currently have service(s) that help you manage your care at home? No     Do you take prescription medications? Yes     Do you have prescription coverage? Yes     Coverage EDICAID - [facility]) -     Do you have any problems affording any of your prescribed medications? No     Is the patient taking medications as prescribed? yes     Who is going to help you get home at discharge? Spouse- Delbert Hawley- [phone]     How do you get to doctors appointments? family or friend will provide     Are you on dialysis? No     Do you take coumadin? No     Discharge Plan A Home with family;Home Health     Discharge Plan B Home with family;Hospice/home     DME Needed Upon Discharge  other (see comments)   TBD    Discharge Plan discussed with: Patient     Transition of Care Barriers Underinsured        Health Literacy    How often do you need to have someone help you when you read instructions, pamphlets, or other written material from your doctor or pharmacy? Never        OTHER    Name(s) of People in Home Spouse- Delbert Hawley- [phone] and sister- Jaye Baum- [phone]                         SW spoke with patient in 350 for Discharge Planning Assessment. Per patient, Pt lives spousein a single family home on a slab foundation with five steps to porch and point of entry.  Patient was independent with ADLS and DID use DME or in-home assistive equipment. Pt is not on dialysis  or coumadin,  takes medications as prescribed / keeps refilled / has resources for all daily and prescriptive needs. Preferred pharmacy is [facility] - Agreeable to bedside delivery.   Will have help from  Spouse- Delbert Hawley- [phone] and sister- Jaye Baum- [phone]  and other immediate family upon discharge - Family to provide transportation home.  All questions addressed. Unit and SW direct numbers provided. Will continue to follow for course of hospitalization..    [facility] - for IV therapy    Discharge Plan A and Plan B have been determined by review of patient's clinical status, future medical and therapeutic needs, and coverage/benefits for post-acute care in coordination with multidisciplinary team members.   7/24/2024  1:42 AM  Acute decompensated heart failure [I50.9]    PCP: Cristobal Ann MD    PHARMACY:   [facility]  [address] [place] [address]  Phone: [phone] Fax: [phone]    [facility] [address] [place] [address]  Phone: [phone] Fax: [phone]      Payor: MEDICAID / Plan: [facility]) / Product Type: Managed Medicaid /       Ele Benites LMSW  - [facility]

## 2024-07-24 NOTE — ASSESSMENT & PLAN NOTE
Troponin I   Date Value Ref Range Status   07/24/2024 1.617 (H) 0.000 - 0.026 ng/mL Final     Comment:     The reference interval for Troponin I represents the 99th percentile   cutoff   for our facility and is consistent with 3rd generation assay   performance.     07/23/2024 1.723 (H) 0.000 - 0.026 ng/mL Final     Comment:     The reference interval for Troponin I represents the 99th percentile   cutoff   for our facility and is consistent with 3rd generation assay   performance.     07/23/2024 1.755 (H) 0.000 - 0.026 ng/mL Final     Comment:     The reference interval for Troponin I represents the 99th percentile   cutoff   for our facility and is consistent with 3rd generation assay   performance.       Patient's troponin elevated at baseline secondary to extensive cardiac history. Continue to monitor.

## 2024-07-24 NOTE — ASSESSMENT & PLAN NOTE
- Creatine (at baseline) stable for now. BMP reviewed. Estimated Creatinine Clearance: 24.5 mL/min (A) (based on SCr of 2.7 mg/dL (H)). according to latest data. Based on current GFR, CKD stage is stage 4 - GFR 15-29.     - Monitor UOP and serial BMP and adjust therapy as needed. Renally dose meds. Avoid nephrotoxic medications and procedures.    Creatinine   Date Value Ref Range Status   07/22/2024 2.7 (H) 0.5 - 1.4 mg/dL Final     BUN   Date Value Ref Range Status   07/22/2024 77 (H) 6 - 20 mg/dL Final     eGFR   Date Value Ref Range Status   07/22/2024 20 (A) >60 mL/min/1.73 m^2 Final

## 2024-07-24 NOTE — ASSESSMENT & PLAN NOTE
Hemoglobin A1C   Date Value Ref Range Status   05/05/2024 5.6 4.0 - 5.6 % Final     Comment:     ADA Screening Guidelines:  5.7-6.4%  Consistent with prediabetes  >or=6.5%  Consistent with diabetes    High levels of fetal hemoglobin interfere with the HbA1C  assay. Heterozygous hemoglobin variants (HbS, HgC, etc)do  not significantly interfere with this assay.   However, presence of multiple variants may affect accuracy.       - Continue Jardiance  - LDSSI  - Goal glucose of 140-180 while in patient

## 2024-07-25 LAB
ANION GAP SERPL CALC-SCNC: 13 MMOL/L (ref 8–16)
ANISOCYTOSIS BLD QL SMEAR: ABNORMAL
BASOPHILS # BLD AUTO: 0.04 K/UL (ref 0–0.2)
BASOPHILS NFR BLD: 0.8 % (ref 0–1.9)
BILIRUB UR QL STRIP: NEGATIVE
BUN SERPL-MCNC: 98 MG/DL (ref 6–20)
CALCIUM SERPL-MCNC: 8.6 MG/DL (ref 8.7–10.5)
CHLORIDE SERPL-SCNC: 100 MMOL/L (ref 95–110)
CLARITY UR REFRACT.AUTO: CLEAR
CO2 SERPL-SCNC: 24 MMOL/L (ref 23–29)
COLOR UR AUTO: YELLOW
CREAT SERPL-MCNC: 3.8 MG/DL (ref 0.5–1.4)
CREAT UR-MCNC: 60 MG/DL (ref 15–325)
CREAT UR-MCNC: 60 MG/DL (ref 15–325)
DACRYOCYTES BLD QL SMEAR: ABNORMAL
DIFFERENTIAL METHOD BLD: ABNORMAL
EOSINOPHIL # BLD AUTO: 0.1 K/UL (ref 0–0.5)
EOSINOPHIL NFR BLD: 2.4 % (ref 0–8)
ERYTHROCYTE [DISTWIDTH] IN BLOOD BY AUTOMATED COUNT: 31.4 % (ref 11.5–14.5)
EST. GFR  (NO RACE VARIABLE): 13.2 ML/MIN/1.73 M^2
FERRITIN SERPL-MCNC: 87 NG/ML (ref 20–300)
GIANT PLATELETS BLD QL SMEAR: PRESENT
GLUCOSE SERPL-MCNC: 88 MG/DL (ref 70–110)
GLUCOSE UR QL STRIP: ABNORMAL
HCT VFR BLD AUTO: 34.7 % (ref 37–48.5)
HGB BLD-MCNC: 9.9 G/DL (ref 12–16)
HGB UR QL STRIP: NEGATIVE
HOWELL-JOLLY BOD BLD QL SMEAR: ABNORMAL
HYPOCHROMIA BLD QL SMEAR: ABNORMAL
IMM GRANULOCYTES # BLD AUTO: 0.05 K/UL (ref 0–0.04)
IMM GRANULOCYTES NFR BLD AUTO: 1 % (ref 0–0.5)
IRON SERPL-MCNC: 91 UG/DL (ref 30–160)
KETONES UR QL STRIP: NEGATIVE
LEUKOCYTE ESTERASE UR QL STRIP: NEGATIVE
LYMPHOCYTES # BLD AUTO: 1 K/UL (ref 1–4.8)
LYMPHOCYTES NFR BLD: 18.7 % (ref 18–48)
MAGNESIUM SERPL-MCNC: 2.2 MG/DL (ref 1.6–2.6)
MCH RBC QN AUTO: 19.5 PG (ref 27–31)
MCHC RBC AUTO-ENTMCNC: 28.5 G/DL (ref 32–36)
MCV RBC AUTO: 68 FL (ref 82–98)
MONOCYTES # BLD AUTO: 0.6 K/UL (ref 0.3–1)
MONOCYTES NFR BLD: 12.2 % (ref 4–15)
NEUTROPHILS # BLD AUTO: 3.3 K/UL (ref 1.8–7.7)
NEUTROPHILS NFR BLD: 64.9 % (ref 38–73)
NITRITE UR QL STRIP: NEGATIVE
NRBC BLD-RTO: 9 /100 WBC
OVALOCYTES BLD QL SMEAR: ABNORMAL
PH UR STRIP: 5 [PH] (ref 5–8)
PHOSPHATE SERPL-MCNC: 5.6 MG/DL (ref 2.7–4.5)
PLATELET # BLD AUTO: 151 K/UL (ref 150–450)
PLATELET BLD QL SMEAR: ABNORMAL
PMV BLD AUTO: ABNORMAL FL (ref 9.2–12.9)
POCT GLUCOSE: 170 MG/DL (ref 70–110)
POCT GLUCOSE: 216 MG/DL (ref 70–110)
POIKILOCYTOSIS BLD QL SMEAR: ABNORMAL
POLYCHROMASIA BLD QL SMEAR: ABNORMAL
POTASSIUM SERPL-SCNC: 5 MMOL/L (ref 3.5–5.1)
PROT UR QL STRIP: NEGATIVE
PROT UR-MCNC: 14 MG/DL (ref 0–15)
PROT/CREAT UR: 0.23 MG/G{CREAT} (ref 0–0.2)
RBC # BLD AUTO: 5.08 M/UL (ref 4–5.4)
SATURATED IRON: 32 % (ref 20–50)
SCHISTOCYTES BLD QL SMEAR: ABNORMAL
SCHISTOCYTES BLD QL SMEAR: PRESENT
SODIUM SERPL-SCNC: 137 MMOL/L (ref 136–145)
SODIUM UR-SCNC: 63 MMOL/L (ref 20–250)
SP GR UR STRIP: 1.01 (ref 1–1.03)
SPHEROCYTES BLD QL SMEAR: ABNORMAL
TARGETS BLD QL SMEAR: ABNORMAL
TOTAL IRON BINDING CAPACITY: 281 UG/DL (ref 250–450)
TRANSFERRIN SERPL-MCNC: 190 MG/DL (ref 200–375)
TRANSFERRIN SERPL-MCNC: 190 MG/DL (ref 200–375)
URN SPEC COLLECT METH UR: ABNORMAL
UUN UR-MCNC: 371 MG/DL (ref 140–1050)
WBC # BLD AUTO: 5.07 K/UL (ref 3.9–12.7)

## 2024-07-25 PROCEDURE — 36415 COLL VENOUS BLD VENIPUNCTURE: CPT

## 2024-07-25 PROCEDURE — 63600175 PHARM REV CODE 636 W HCPCS: Performed by: HOSPITALIST

## 2024-07-25 PROCEDURE — 84100 ASSAY OF PHOSPHORUS: CPT

## 2024-07-25 PROCEDURE — 25000003 PHARM REV CODE 250

## 2024-07-25 PROCEDURE — 84540 ASSAY OF URINE/UREA-N: CPT

## 2024-07-25 PROCEDURE — 85025 COMPLETE CBC W/AUTO DIFF WBC: CPT

## 2024-07-25 PROCEDURE — 84156 ASSAY OF PROTEIN URINE: CPT

## 2024-07-25 PROCEDURE — G0378 HOSPITAL OBSERVATION PER HR: HCPCS

## 2024-07-25 PROCEDURE — 82570 ASSAY OF URINE CREATININE: CPT

## 2024-07-25 PROCEDURE — 20600001 HC STEP DOWN PRIVATE ROOM

## 2024-07-25 PROCEDURE — 82728 ASSAY OF FERRITIN: CPT

## 2024-07-25 PROCEDURE — 99233 SBSQ HOSP IP/OBS HIGH 50: CPT | Mod: ,,, | Performed by: EMERGENCY MEDICINE

## 2024-07-25 PROCEDURE — 83735 ASSAY OF MAGNESIUM: CPT

## 2024-07-25 PROCEDURE — 81003 URINALYSIS AUTO W/O SCOPE: CPT

## 2024-07-25 PROCEDURE — 80048 BASIC METABOLIC PNL TOTAL CA: CPT | Performed by: HOSPITALIST

## 2024-07-25 PROCEDURE — 25000242 PHARM REV CODE 250 ALT 637 W/ HCPCS: Performed by: INTERNAL MEDICINE

## 2024-07-25 PROCEDURE — 25000003 PHARM REV CODE 250: Performed by: HOSPITALIST

## 2024-07-25 PROCEDURE — 84300 ASSAY OF URINE SODIUM: CPT

## 2024-07-25 PROCEDURE — 84466 ASSAY OF TRANSFERRIN: CPT

## 2024-07-25 RX ORDER — SERTRALINE HYDROCHLORIDE 100 MG/1
100 TABLET, FILM COATED ORAL DAILY
Status: DISCONTINUED | OUTPATIENT
Start: 2024-07-25 | End: 2024-07-25

## 2024-07-25 RX ADMIN — Medication 400 MG: at 10:07

## 2024-07-25 RX ADMIN — ATORVASTATIN CALCIUM 40 MG: 40 TABLET, FILM COATED ORAL at 10:07

## 2024-07-25 RX ADMIN — HYDRALAZINE HYDROCHLORIDE: 10 TABLET ORAL at 02:07

## 2024-07-25 RX ADMIN — DOBUTAMINE HYDROCHLORIDE 5 MCG/KG/MIN: 400 INJECTION INTRAVENOUS at 05:07

## 2024-07-25 RX ADMIN — APIXABAN 2.5 MG: 2.5 TABLET, FILM COATED ORAL at 09:07

## 2024-07-25 RX ADMIN — PROMETHAZINE HYDROCHLORIDE AND CODEINE PHOSPHATE 10 ML: 6.25; 1 SOLUTION ORAL at 10:07

## 2024-07-25 RX ADMIN — BUMETANIDE 4 MG: 1 TABLET ORAL at 09:07

## 2024-07-25 RX ADMIN — HYDRALAZINE HYDROCHLORIDE: 10 TABLET ORAL at 10:07

## 2024-07-25 RX ADMIN — APIXABAN 2.5 MG: 2.5 TABLET, FILM COATED ORAL at 10:07

## 2024-07-25 RX ADMIN — METOLAZONE 5 MG: 5 TABLET ORAL at 09:07

## 2024-07-25 RX ADMIN — ASPIRIN 81 MG: 81 TABLET, COATED ORAL at 09:07

## 2024-07-25 RX ADMIN — HYDRALAZINE HYDROCHLORIDE: 10 TABLET ORAL at 09:07

## 2024-07-25 RX ADMIN — AMIODARONE HYDROCHLORIDE 200 MG: 200 TABLET ORAL at 09:07

## 2024-07-25 RX ADMIN — ALLOPURINOL 300 MG: 300 TABLET ORAL at 09:07

## 2024-07-25 RX ADMIN — PANTOPRAZOLE SODIUM 40 MG: 40 TABLET, DELAYED RELEASE ORAL at 09:07

## 2024-07-25 RX ADMIN — PREGABALIN 50 MG: 50 CAPSULE ORAL at 10:07

## 2024-07-25 RX ADMIN — EMPAGLIFLOZIN 10 MG: 25 TABLET, FILM COATED ORAL at 09:07

## 2024-07-25 NOTE — ASSESSMENT & PLAN NOTE
Hemoglobin A1C   Date Value Ref Range Status   07/24/2024 5.2 4.0 - 5.6 % Final     Comment:     ADA Screening Guidelines:  5.7-6.4%  Consistent with prediabetes  >or=6.5%  Consistent with diabetes    High levels of fetal hemoglobin interfere with the HbA1C  assay. Heterozygous hemoglobin variants (HbS, HgC, etc)do  not significantly interfere with this assay.   However, presence of multiple variants may affect accuracy.       - Continue Jardiance  - LDSSI  - Goal glucose of 140-180 while in patient

## 2024-07-25 NOTE — ASSESSMENT & PLAN NOTE
59 y/o f, with end-stage heart failure, on a dobutamine infusion, admitted with worsening weakness, malaise, anorexia, nausea    Advance Care Planning     Medicolegal  Decision-making:   -Pt does have decision-making capacity  -Pt has appointed a HCPOA.  Patient has a document uploaded in epic.  She has chosen her daughter, Erika Estrada, to be her HCPOA and her  to be her 2nd  -Marital status:   -Children: 2 adult children    Advance care planning/Advance directives:  -The patient has previously engaged in advance care planning  -HCPOA in [employer]    Psychosocial  Support system:  -Spiritual: yes;  Patient is part of a particular bernabe background: Catholic  The palliative care  will be consulted to assess the patient.  -Family: seem involved and supportive though pt lives in [place] which is an hour away and suspect this is why no one is at bedside.  She is very close with her granddaughter who is 18 years old and she states that she visits her every single day when she is home.  She is also close with her daughter though she says her daughter has 6 children, is currently pregnant, has Graves disease and has a lot on her plate.    Health status prior to this hospitalization  -Functional status: fair.  Pt was able to manage ADLs though patient tells us that recently she had become increasingly fatigued, less able and motivated to go outside.  She states that recently all she is wanted to do was laid down in bed and sleep  -Cognitive status: intact   -QOL: good - patient feels like she was still able to enjoy interactions with her family which bring her a lot of meaning    Prognosis:  -Time and potential for recovery:  Patient obviously with end-stage heart failure, high short-term mortality risk though she has been doing relatively well.  I do worry though given the symptoms that she is describing including anorexia, profound fatigue, nausea, anorexia, that she could in fact now be approaching  the end of her life.  Patient aware of this as well.    [facility] Discussion  7/25/24  With patient, myself, Verónica Carrington, pt's  and a family friend  -we had a long and productive meeting with the patient and her .  We updated the family on the patient's condition.  I shared my worried that her condition has worsened as evidenced by her worsening symptom burden, fatigue, drowsiness, nausea, anorexia.  Patient agreed that she has less energy and is less interested in moving around and doing things.   asked a lot of questions about whether the patient could still be eligible for a transplant.  They have apparently explored the possibility of getting the patient re-evaluated in [place].  They already tried doing this in [place] but were unsuccessful given the patient's insurance status.  Myself and the  explained why we did not think the patient would be eligible for transplant in [place] and why we did not think it would be a good use of their time.  Patient shared a lot of her disappointment and sadness over being turned down for a transplant last year.  She was very tearful during this part in the conversation, feeling like she did everything that she was asked to do and could do and feeling let down that she was turned down anyway.  We validated these feelings.      We did ultimately recommend that when patient is discharged home, she goes with hospice care and we reviewed all the reasons why this could be helpful to them.  Patient apparently very briefly enrolled in hospice many months ago and says she ultimately did not want it at that time though she even admits that she was not nearly as sick then as she is now.  We explained why it would be helpful to have that extra layer of support and symptom management given the patient is in a different stage of her illness.  In addition, we shared our worry that every time the patient comes to the hospital, she is very far from her  community of friends and family.   asked me to be blunt and tell them exactly what I was thinking was happening with the patient.  I told them that I was worried time was short, and could be on the order of weeks to months.  Has thanked me for being direct with them.    Patient and family needing more time to talk about these options amongst themselves and we made a plan to check in tomorrow morning to learn how they were thinking about next steps.  7/24/24  With patient and Dr. Pichardo:  -I had a long and productive conversation with Ms. Hawley about her current situation.  She was able to explain to us why she ended up in the hospital.  She said that she was increasingly weak, wanting to sleep all the time, without any appetite, constant nausea.  She said that recently all she has wanted to do is stay in bed and sleep but that her  became worried and frustrated with her and convinced her to go to the hospital.  She said that it was actually her preference to not come back to the hospital that he ultimately forced her to go.  She is repeatedly questioning what could have happened recently to have triggered all of the symptoms, wondering out loud if it is a medication that she has taken or a recent fall that she had.  I shared my worry that her symptoms are likely secondary to her worsening heart failure and she was able to acknowledge that this could be the case.  There is a lot of tension in her because of what her body is wanting to do at this point in time (rest/sleep) and what she feels like she should be doing to support her family.  She has also largely tried to shield her family and friends from the severity of her diagnosis which has left her increasingly isolated from them.  I explained that it would be reasonable to consider enrolling in hospice care upon discharge as this would allow her to stay at home with extra support and patient was definitely receptive to this discussion and  possibility.  She seems worried about her family's acceptance of this plan however.    Active symptoms  -Pain: none at this time    -Nausea: consider zofran 4 mg PO around the clock given persistence of nausea    -Constipation: well-controlled on bowel regimen    -Dyspnea mild.  Dr. Michel had recommended a low-dose fentanyl patch at the last visit but in discussion with the patient she never started it as she was worried about sedation.    -Depression:  Patient's mood does seem flat to me.  On review of Dr. Michel's most recent note, patient was supposed to be on Zoloft 100 mg daily but I do not see that she is getting that medication here.  Would restart.    -Anorexia:  Patient had actually eaten a decent amount of her lunch when I was in the room.  Suspect that anorexia secondary to her end-stage heart failure.  I do not recommend pharmacologic management of this condition.    Summary/Recommendation:  -Most important goals at this time: see family meeting notes above  -patient and family seem to be coming to terms with the possibility that patient's condition has worsened and she is in fact now in a different place medically.  They are considering hospice care but I do not know whether they will ultimately enroll.  -Code status: FULL - not addressed in meeting - would defer discussions for now  -Most appropriate disposition: ideally home with hospice

## 2024-07-25 NOTE — SUBJECTIVE & OBJECTIVE
Interval History: NAEO, VSS, on nasal cannula o2. Afebrile. Heart transplant service consulted 7/25. Patient refused CPAP last night per RTT. Palliative care continuing consult.      Review of Systems   Constitutional:  Positive for activity change, appetite change and fatigue. Negative for chills and fever.   Respiratory:  Positive for shortness of breath.    Gastrointestinal:  Positive for nausea. Negative for constipation, diarrhea and vomiting.   Neurological:  Positive for weakness.     Objective:     Vital Signs (Most Recent):  Temp: 98 °F (36.7 °C) (07/25/24 1530)  Pulse: 93 (07/25/24 1530)  Resp: 18 (07/25/24 1530)  BP: 125/64 (07/25/24 1530)  SpO2: 97 % (07/25/24 1530) Vital Signs (24h Range):  Temp:  [97.3 °F (36.3 °C)-98.2 °F (36.8 °C)] 98 °F (36.7 °C)  Pulse:  [76-93] 93  Resp:  [18-19] 18  SpO2:  [96 %-98 %] 97 %  BP: (102-125)/(64-77) 125/64     Weight: 81.9 kg (180 lb 8.9 oz)  Body mass index is 29.14 kg/m².    Intake/Output Summary (Last 24 hours) at 7/25/2024 1621  Last data filed at 7/25/2024 0944  Gross per 24 hour   Intake 240 ml   Output 1650 ml   Net -1410 ml         Physical Exam  Constitutional:       General: She is awake. She is not in acute distress.     Appearance: She is ill-appearing.      Comments: Laying comfortably in bed     HENT:      Nose: Nose normal.      Mouth/Throat:      Mouth: Mucous membranes are moist.      Pharynx: Oropharynx is clear.   Eyes:      Extraocular Movements: Extraocular movements intact.      Conjunctiva/sclera: Conjunctivae normal.   Cardiovascular:      Rate and Rhythm: Normal rate.      Pulses: Normal pulses.      Heart sounds: Normal heart sounds.   Pulmonary:      Effort: Pulmonary effort is normal.      Breath sounds: Normal breath sounds. No wheezing, rhonchi or rales.      Comments: On O2 nasal cannula    Abdominal:      General: There is no distension.      Palpations: Abdomen is soft. There is no mass.      Tenderness: Tenderness: mildly in the  epigastric region.   Musculoskeletal:         General: No swelling, tenderness or deformity. Normal range of motion.      Right lower leg: No edema.      Left lower leg: No edema.   Skin:     General: Skin is warm and dry.      Capillary Refill: Capillary refill takes less than 2 seconds.   Neurological:      General: No focal deficit present.      Mental Status: She is alert and oriented to person, place, and time.      Comments: Awake, engaged, able to answer all questions   Psychiatric:         Attention and Perception: Attention normal.         Mood and Affect: Mood is depressed. Affect is blunt.         Speech: Speech normal.         Behavior: Behavior normal.             Significant Labs: All pertinent labs within the past 24 hours have been reviewed.    Significant Imaging: I have reviewed all pertinent imaging results/findings within the past 24 hours.

## 2024-07-25 NOTE — ASSESSMENT & PLAN NOTE
Likely due to chronic disease    - continue medication regimen  - replete electrolytes as needed  - nutrition consulted

## 2024-07-25 NOTE — PHARMACY MED REC
"  Admission Medication History     The home medication history was taken by Soledad Flores.    You may go to "Admission" then "Reconcile Home Medications" tabs to review and/or act upon these items.     The home medication list has been updated by the Pharmacy department.   Please read ALL comments highlighted in yellow.   Please address this information as you see fit.    Feel free to contact us if you have any questions or require assistance.    Medications listed below were obtained from: Patient/family and Analytic software- [employer]  PTA Medications   Medication Sig    acetaminophen (TYLENOL) 650 MG TbSR   Take 650 mg by mouth daily as needed (pain).    albuterol-ipratropium (DUO-NEB) 2.5 mg-0.5 mg/3 mL nebulizer solution   Take 3 mLs by nebulization every 8 (eight) hours as needed for Wheezing or Shortness of Breath.)    allopurinoL (ZYLOPRIM) 300 MG tablet   Take 1 tablet (300 mg total) by mouth once daily.    ALPRAZolam (XANAX) 2 MG Tab   Take 2 mg by mouth daily as needed (anxiety).)    apixaban (ELIQUIS) 2.5 mg Tab   Take 1 tablet orally 2 times a day.    aspirin (ECOTRIN) 81 MG EC tablet   Take 81 mg by mouth once daily.    atorvastatin (LIPITOR) 40 MG tablet   Take 1 tablet (40 mg total) by mouth every evening.    atorvastatin (LIPITOR) 40 MG tablet   Take 1 tablet orally once in the evening.    bumetanide (BUMEX) 2 MG tablet   Take 2 mg by mouth 2 (two) times a day.)    cetirizine (ZYRTEC) 10 MG tablet   Take 1 tablet (10 mg total) by mouth once daily.    cyanocobalamin (VITAMIN B-12) 1000 MCG tablet   Take 1,000 mcg by mouth once daily.    dicyclomine (BENTYL) 20 mg tablet    Take 20 mg by mouth daily as needed (abd pain).)    DOBUTamine (DOBUTREX) 1,000 mg/250 mL (4,000 mcg/mL) infusion   Inject 409 mcg/min into the vein continuous.    empagliflozin (JARDIANCE) 25 mg tablet   Take 25 mg by mouth once daily.)    ergocalciferol (VITAMIN D2) 50,000 unit Cap    Take 50,000 Units by mouth every 7 days. " Thursdays)    ferrous sulfate 325 (65 FE) MG EC tablet   Take 325 mg by mouth once daily.    fluticasone propionate (FLONASE ALLERGY RELIEF) 50 mcg/actuation nasal spray   2 sprays by Each Nostril route daily as needed for Rhinitis or Allergies.)    glimepiride (AMARYL) 1 MG tablet   Take 1 tablet (1 mg total) by mouth before breakfast.    HYDROcodone-acetaminophen (NORCO)  mg per tablet   Take 1 tablet by mouth daily as needed for Pain.)    isosorbide-hydrALAZINE 20-37.5 mg (BIDIL) 20-37.5 mg Tab    Take 1 tablet by mouth 2 (two) times daily.)    LIDOcaine (LIDODERM) 5 % Place 1 patch onto the skin daily as needed (pain).      magnesium oxide (MAG-OX) 400 mg (241.3 mg magnesium) tablet    Take 400 mg by mouth every evening.)    metOLazone (ZAROXOLYN) 5 MG tablet   Take 5 mg by mouth once daily.    metoprolol succinate (TOPROL-XL) 25 MG 24 hr tablet   Take 12.5 mg by mouth once daily.    nitroGLYCERIN (NITROSTAT) 0.4 MG SL tablet Put 1 tab under the tongue every 5 minutes x 3 doses as needed for chest pain. Do not exceed 3 doses in 15 minutes. If no relief, go to ER.      ondansetron (ZOFRAN-ODT) 8 MG TbDL    Take 8 mg by mouth daily as needed (nausea).)    pantoprazole (PROTONIX) 40 MG tablet   Take 1 tablet orally once a day.    polyethylene glycol (MIRALAX) 17 gram/dose powder   Take 17 g by mouth daily as needed for Constipation.)    pregabalin (LYRICA) 50 MG capsule   Take 1 capsule (50 mg total) by mouth every evening.    sertraline (ZOLOFT) 100 MG tablet   Take 1 tablet (100 mg total) by mouth once daily.    SPIRIVA WITH HANDIHALER 18 mcg inhalation capsule   Inhale 1 capsule (18 mcg total) into the lungs once daily.    VENTOLIN HFA 90 mcg/actuation inhaler  Inhale 2 puffs into the lungs daily as needed for Shortness of Breath or Wheezing.)      amiodarone (PACERONE) 200 MG Tab   Take 1 tablet orally once a day.    fentaNYL (DURAGESIC) 12 mcg/hr PT72 Place 1 patch onto the skin every 72 hours.     Soledad  Burfect  [id number]               .

## 2024-07-25 NOTE — PROGRESS NOTES
Arie Vazquez - Cardiology [facility] Medicine  Progress Note    Patient Name: Hafsa Hawley  MRN: [id number]  Patient Class: IP- Inpatient   Admission Date: 7/24/2024  Length of Stay: 1 days  Attending Physician: Nils Damico III*  Primary Care Provider: Cristobal Ann MD        Subjective:     Principal Problem:End stage heart failure        HPI:  Ms. Hafsa Hawley is a 59yo F with a PMHx of NICM s/p AICD placement on palliative dobutamine infusion, Afib (on amiodarone and eliquis), CKD, HTN, diabetes, presenting as a transfer from [facility] with a chief complaint of fatigue. Patient also complaining of coughing, chest pressure, difficulty breathing, and dizziness. Patient reports she's been feeling this way for the past week. She does not report any changes to her diet or fluid intake. She does have home oxygen which she uses as needed. She does mention that she has been out of her cough medication (promethazine-codeine) for the past month. Since, her cough has been more persistent and keeping her up at night. Associated symptoms include nausea, chronic and unchanged. She denies any headaches, fever, chills, chest pain, palpitations, vomiting, diarrhea, hematochezia, urinary symptoms, or leg swelling. She has been compliant with her medications including her GDMT medications.     ED Course at [facility]:  Afebrile and HDS. Mildly tachycardic, but otherwise VS wnl. On low flow NC for comfort, but no hypoxia. Pt states her  gtt rate was recently lowered due to weight loss. Workup most significant for hgb 11.0, normal WBC and PLTs, normal Na and K, BUN 77, Cr 2.7 (below her baseline which seems to be mid 3s-4), BG 130s, T bili 2.4, BNP 2,754 (appears to be approx her recent baseline - this month, although [id number] in June), trop 1.885 (lower than recent baseline). CXR with enlarged cardiac silhouette.  Left lung base is obscured.  Right basilar airspace disease.  Right upper extremity PICC  line, unchanged in position.  Left chest wall AICD.     She was given 80 mg IV lasix, breathing treatments, and O2 by NC for comfort in the ED. Not much UOP since receiving 80 mg IV lasix. Transfer is requested for cardiology consult. She is not yet ready to consider hospice, but is following with palliative medicine. PFC spoke to [facility] and since pt is on palliative  with no advanced options, they requested admission to hospital medicine.     Overview/Hospital Course:  Pallative care consulted 7/23. Nutrition consulted 7/24. Heart Transplant service consulted 7/25. US Retroperitoneal 7/25, demonstrated Findings suggestive of chronic medical renal disease. No hydronephrosis. Hypoechoic lesion in the interpolar region of the right kidney measuring 1.4 cm correlating with prior CT.  Further evaluation with contrast enhanced CT or MRI is recommended on a nonemergent basis if the patient is compatible. Right adrenal mass incidentally noted, stable in size allowing for differences in imaging technique.     Interval History: NAEO, VSS, on nasal cannula o2. Afebrile. Heart transplant service consulted 7/25. Patient refused CPAP last night per RTT. Palliative care continuing consult.      Review of Systems   Constitutional:  Positive for activity change, appetite change and fatigue. Negative for chills and fever.   Respiratory:  Positive for shortness of breath.    Gastrointestinal:  Positive for nausea. Negative for constipation, diarrhea and vomiting.   Neurological:  Positive for weakness.     Objective:     Vital Signs (Most Recent):  Temp: 98 °F (36.7 °C) (07/25/24 1530)  Pulse: 93 (07/25/24 1530)  Resp: 18 (07/25/24 1530)  BP: 125/64 (07/25/24 1530)  SpO2: 97 % (07/25/24 1530) Vital Signs (24h Range):  Temp:  [97.3 °F (36.3 °C)-98.2 °F (36.8 °C)] 98 °F (36.7 °C)  Pulse:  [76-93] 93  Resp:  [18-19] 18  SpO2:  [96 %-98 %] 97 %  BP: (102-125)/(64-77) 125/64     Weight: 81.9 kg (180 lb 8.9 oz)  Body mass index is 29.14  kg/m².    Intake/Output Summary (Last 24 hours) at 7/25/2024 1621  Last data filed at 7/25/2024 0944  Gross per 24 hour   Intake 240 ml   Output 1650 ml   Net -1410 ml         Physical Exam  Constitutional:       General: She is awake. She is not in acute distress.     Appearance: She is ill-appearing.      Comments: Laying comfortably in bed     HENT:      Nose: Nose normal.      Mouth/Throat:      Mouth: Mucous membranes are moist.      Pharynx: Oropharynx is clear.   Eyes:      Extraocular Movements: Extraocular movements intact.      Conjunctiva/sclera: Conjunctivae normal.   Cardiovascular:      Rate and Rhythm: Normal rate.      Pulses: Normal pulses.      Heart sounds: Normal heart sounds.   Pulmonary:      Effort: Pulmonary effort is normal.      Breath sounds: Normal breath sounds. No wheezing, rhonchi or rales.      Comments: On O2 nasal cannula    Abdominal:      General: There is no distension.      Palpations: Abdomen is soft. There is no mass.      Tenderness: Tenderness: mildly in the epigastric region.   Musculoskeletal:         General: No swelling, tenderness or deformity. Normal range of motion.      Right lower leg: No edema.      Left lower leg: No edema.   Skin:     General: Skin is warm and dry.      Capillary Refill: Capillary refill takes less than 2 seconds.   Neurological:      General: No focal deficit present.      Mental Status: She is alert and oriented to person, place, and time.      Comments: Awake, engaged, able to answer all questions   Psychiatric:         Attention and Perception: Attention normal.         Mood and Affect: Mood is depressed. Affect is blunt.         Speech: Speech normal.         Behavior: Behavior normal.             Significant Labs: All pertinent labs within the past 24 hours have been reviewed.    Significant Imaging: I have reviewed all pertinent imaging results/findings within the past 24 hours.    Assessment/Plan:      * End stage heart failure  - see  NICM and Chronic systolic/diastolic heart failure      Fatigue  Likely due to chronic disease    - continue medication regimen  - replete electrolytes as needed  - nutrition consulted        Nausea  - PRN ondansetron ordered        Lower extremity neuropathy  - Continue home Lyrica      Hyperlipidemia, mixed  - Continue home atorvastatin        Anemia of chronic disease  Anemia is likely due to chronic disease due to Chronic Kidney Disease. Most recent hemoglobin and hematocrit are listed below.  Recent Labs     07/22/24  2209 07/24/24  0432 07/25/24  0304   HGB 11.0* 10.6* 9.9*   HCT 36.7* 36.6* 34.7*     Plan  - Monitor serial CBC: Daily  - Transfuse PRBC if patient becomes hemodynamically unstable, symptomatic or H/H drops below 7/21.  - Patient Has not received any PRBCs to date  - Patient's anemia is currently  stable    CKD (chronic kidney disease) stage 4, GFR 15-29 ml/min  - Creatine (at baseline) stable for now. BMP reviewed. Estimated Creatinine Clearance: 17.4 mL/min (A) (based on SCr of 3.8 mg/dL (H)). according to latest data. Based on current GFR, CKD stage is stage 4 - GFR 15-29.     - Monitor UOP and serial BMP and adjust therapy as needed. Renally dose meds. Avoid nephrotoxic medications and procedures.    Creatinine   Date Value Ref Range Status   07/25/2024 3.8 (H) 0.5 - 1.4 mg/dL Final     BUN   Date Value Ref Range Status   07/25/2024 98 (H) 6 - 20 mg/dL Final     eGFR   Date Value Ref Range Status   07/25/2024 13.2 (A) >60 mL/min/1.73 m^2 Final        Paroxysmal A-fib  History of atrial fibrillation. Stable.    - Continue home amiodarone  - Continue home eliquis  - Cardiac monitoring while inpatient    Gout, arthritis  - Continue home allopurinol      Palliative care encounter  - continue with palliative care consult      Pulmonary HTN  - Continue home medication      Type 2 diabetes mellitus with stage 4 chronic kidney disease, without long-term current use of insulin  Hemoglobin A1C   Date  Value Ref Range Status   07/24/2024 5.2 4.0 - 5.6 % Final     Comment:     ADA Screening Guidelines:  5.7-6.4%  Consistent with prediabetes  >or=6.5%  Consistent with diabetes    High levels of fetal hemoglobin interfere with the HbA1C  assay. Heterozygous hemoglobin variants (HbS, HgC, etc)do  not significantly interfere with this assay.   However, presence of multiple variants may affect accuracy.       - Continue Jardiance  - LDSSI  - Goal glucose of 140-180 while in patient    ICD (implantable cardioverter-defibrillator) in place  Primary prevention. See NICM for plan.       Depression due to physical illness  - Continue home sertraline  - Patient states she has not taken sertraline in 3 months, does not want to take it inpatient. Pt warned on side effects of sudden stoppage of this medications.  - Will monitor for SSRI withdrawal       Chronic combined systolic and diastolic heart failure  Ms. Hafsa Hawley is a 57yo F with NICM s/p AICD placement on palliative dobutamine infusion, Afib (on amiodarone and eliquis), CKD, HTN, diabetes, presenting with a chief complaint of fatigue. She has a history of acute decompensated heart failure exacerbations and managed with GDMT. Most recent echo results shown below with an EF of 15%. The clinical picture during this admit does not fit a HF exacerbation. Her CXR shows cardiomegaly but does not highlight any major changes or increase in fluid volume. On exam, patient is in NAD, without any clinical signs of fluid overload. Lung examination was clear, no observable JVD, or lower extremity swelling. Her fatigue seems to be likely due to lack of rest secondary to her coughing episodes.     Echo    Result Date: 5/7/2024    Left Ventricle: The left ventricle is severely dilated. Severely   increased ventricular mass. Normal wall thickness. There is severe   eccentric hypertrophy. Severe global hypokinesis present. There is   severely reduced systolic function with a  visually estimated ejection   fraction of 10 -15%. Grade II diastolic dysfunction. Elevated left   ventricular filling pressure. No thrombus present.    Right Ventricle: Normal right ventricular cavity size. Wall thickness   is normal. Right ventricle wall motion  is normal. Systolic function is   mildly reduced. Pacemaker lead present in the ventricle.    Left Atrium: Left atrium is severely dilated.    Right Atrium: Normal right atrial size.    Aortic Valve: The aortic valve is a trileaflet valve. There is mild   annular calcification present. There is mild aortic regurgitation.    Mitral Valve: There is moderate to severe regurgitation with an   eccentric jet and a wall hugging jet.    Tricuspid Valve: There is mild regurgitation.    Pulmonic Valve: There is mild regurgitation.    Aorta: Aortic root is normal in size measuring 2.75 cm. Ascending aorta   is normal measuring 3.49 cm.    Pulmonary Artery: The estimated pulmonary artery systolic pressure is   44 mmHg.    IVC/SVC: Normal venous pressure at 3 mmHg.        - heart transplant team consulted  - Continue home metoprolol, jardiance, hydralazine with isosorbide dinitrate.   - Continue home bumetanide 4mg AM and 2mg PM.   - Continue home metolazone  - Weigh daily, can adjust diuretics as needed  - Strict I/Os  - Low sodium diet      Cough  - promethazine-codeine given  - PRN benzonatate capsules       NICM (nonischemic cardiomyopathy)  History of non-ischemic cardiomyopathy s/p AICD placement on palliative dobutamine and not a candidate for advanced therapies. Patient currently pursuing heart transplant options.    - Continue dobutamine drip 5mcg/kg/min   - Patient follows palliative medicine outpatient  - Heart transplant team consulted 7/25    Essential hypertension  - Continue home meds as outlined in chronic combined systolic and diastolic heart failure.     Elevated troponin  Troponin I   Date Value Ref Range Status   07/24/2024 1.617 (H) 0.000 - 0.026  ng/mL Final     Comment:     The reference interval for Troponin I represents the 99th percentile   cutoff   for our facility and is consistent with 3rd generation assay   performance.     07/23/2024 1.723 (H) 0.000 - 0.026 ng/mL Final     Comment:     The reference interval for Troponin I represents the 99th percentile   cutoff   for our facility and is consistent with 3rd generation assay   performance.     07/23/2024 1.755 (H) 0.000 - 0.026 ng/mL Final     Comment:     The reference interval for Troponin I represents the 99th percentile   cutoff   for our facility and is consistent with 3rd generation assay   performance.       Patient's troponin elevated at baseline secondary to extensive cardiac history. Continue to monitor.        VTE Risk Mitigation (From admission, onward)           Ordered     apixaban tablet 2.5 mg  2 times daily         07/24/24 0435     IP VTE HIGH RISK PATIENT  Once         07/24/24 0341     Place sequential compression device  Until discontinued         07/24/24 0341     Reason for No Pharmacological VTE Prophylaxis  Once        Question:  Reasons:  Answer:  Already adequately anticoagulated on oral Anticoagulants    07/24/24 0341                    Discharge Planning   CHIQUI: 7/29/2024     Code Status: Full Code   Is the patient medically ready for discharge?:     Reason for patient still in hospital (select all that apply): Consult recommendations  Discharge Plan A: Home with family, Home Health              Ranulfo Cárdenas MD  Department of Hospital Medicine   [facility] - Cardiology Stepdown

## 2024-07-25 NOTE — ASSESSMENT & PLAN NOTE
Ms. Hafsa Hawley is a 59yo F with NICM s/p AICD placement on palliative dobutamine infusion, Afib (on amiodarone and eliquis), CKD, HTN, diabetes, presenting with a chief complaint of fatigue. She has a history of acute decompensated heart failure exacerbations and managed with GDMT. Most recent echo results shown below with an EF of 15%. The clinical picture during this admit does not fit a HF exacerbation. Her CXR shows cardiomegaly but does not highlight any major changes or increase in fluid volume. On exam, patient is in NAD, without any clinical signs of fluid overload. Lung examination was clear, no observable JVD, or lower extremity swelling. Her fatigue seems to be likely due to lack of rest secondary to her coughing episodes.     Echo    Result Date: 5/7/2024    Left Ventricle: The left ventricle is severely dilated. Severely   increased ventricular mass. Normal wall thickness. There is severe   eccentric hypertrophy. Severe global hypokinesis present. There is   severely reduced systolic function with a visually estimated ejection   fraction of 10 -15%. Grade II diastolic dysfunction. Elevated left   ventricular filling pressure. No thrombus present.    Right Ventricle: Normal right ventricular cavity size. Wall thickness   is normal. Right ventricle wall motion  is normal. Systolic function is   mildly reduced. Pacemaker lead present in the ventricle.    Left Atrium: Left atrium is severely dilated.    Right Atrium: Normal right atrial size.    Aortic Valve: The aortic valve is a trileaflet valve. There is mild   annular calcification present. There is mild aortic regurgitation.    Mitral Valve: There is moderate to severe regurgitation with an   eccentric jet and a wall hugging jet.    Tricuspid Valve: There is mild regurgitation.    Pulmonic Valve: There is mild regurgitation.    Aorta: Aortic root is normal in size measuring 2.75 cm. Ascending aorta   is normal measuring 3.49 cm.    Pulmonary  Artery: The estimated pulmonary artery systolic pressure is   44 mmHg.    IVC/SVC: Normal venous pressure at 3 mmHg.        - heart transplant team consulted  - Continue home metoprolol, jardiance, hydralazine with isosorbide dinitrate.   - Continue home bumetanide 4mg AM and 2mg PM.   - Continue home metolazone  - Weigh daily, can adjust diuretics as needed  - Strict I/Os  - Low sodium diet

## 2024-07-25 NOTE — ASSESSMENT & PLAN NOTE
- Continue home sertraline  - Patient states she has not taken sertraline in 3 months, does not want to take it inpatient. Pt warned on side effects of sudden stoppage of this medications.  - Will monitor for SSRI withdrawal

## 2024-07-25 NOTE — PROGRESS NOTES
[facility] - Cardiology Stepdown  Palliative Medicine  Progress Note    Patient Name: Hafsa Hawley  MRN: [id number]  Admission Date: 7/24/2024  Hospital Length of Stay: 1 days  Code Status: Full Code   Attending Provider: Nils Damico III*  Consulting Provider: Sophia Franklin MD  Primary Care Physician: Cristobal Ann MD  Principal Problem:End stage heart failure    Patient information was obtained from patient, spouse/SO, and primary team.      Assessment/Plan:     Palliative Care  Palliative care encounter  57 y/o f, with end-stage heart failure, on a dobutamine infusion, admitted with worsening weakness, malaise, anorexia, nausea    Advance Care Planning    Medicolegal  Decision-making:   -Pt does have decision-making capacity  -Pt has appointed a HCPOA.  Patient has a document uploaded in epic.  She has chosen her daughter, Erika Estrada, to be her HCPOA and her  to be her 2nd  -Marital status:   -Children: 2 adult children    Advance care planning/Advance directives:  -The patient has previously engaged in advance care planning  -HCPOA in [employer]    Psychosocial  Support system:  -Spiritual: yes;  Patient is part of a particular bernabe background: Ep[employer]  The palliative care  will be consulted to assess the patient.  -Family: seem involved and supportive though pt lives in [place] which is an hour away and suspect this is why no one is at bedside.  She is very close with her granddaughter who is 18 years old and she states that she visits her every single day when she is home.  She is also close with her daughter though she says her daughter has 6 children, is currently pregnant, has Graves disease and has a lot on her plate.    Health status prior to this hospitalization  -Functional status: fair.  Pt was able to manage ADLs though patient tells us that recently she had become increasingly fatigued, less able and motivated to go outside.  She states that recently all she  is wanted to do was laid down in bed and sleep  -Cognitive status: intact   -QOL: good - patient feels like she was still able to enjoy interactions with her family which bring her a lot of meaning    Prognosis:  -Time and potential for recovery:  Patient obviously with end-stage heart failure, high short-term mortality risk though she has been doing relatively well.  I do worry though given the symptoms that she is describing including anorexia, profound fatigue, nausea, anorexia, that she could in fact now be approaching the end of her life.  Patient aware of this as well.    [facility] Discussion  7/25/24  With patient, myself, Dr. Pichardo, Verónica Brock, pt's  and a family friend  -we had a long and productive meeting with the patient and her .  We updated the family on the patient's condition.  I shared my worried that her condition has worsened as evidenced by her worsening symptom burden, fatigue, drowsiness, nausea, anorexia.  Patient agreed that she has less energy and is less interested in moving around and doing things.   asked a lot of questions about whether the patient could still be eligible for a transplant.  They have apparently explored the possibility of getting the patient re-evaluated in [place].  They already tried doing this in [place] but were unsuccessful given the patient's insurance status.  Myself and the  explained why we did not think the patient would be eligible for transplant in [place] and why we did not think it would be a good use of their time.  Patient shared a lot of her disappointment and sadness over being turned down for a transplant last year.  She was very tearful during this part in the conversation, feeling like she did everything that she was asked to do and could do and feeling let down that she was turned down anyway.  We validated these feelings.      We did ultimately recommend that when patient is discharged home, she goes with hospice care  and we reviewed all the reasons why this could be helpful to them.  Patient apparently very briefly enrolled in hospice many months ago and says she ultimately did not want it at that time though she even admits that she was not nearly as sick then as she is now.  We explained why it would be helpful to have that extra layer of support and symptom management given the patient is in a different stage of her illness.  In addition, we shared our worry that every time the patient comes to the hospital, she is very far from her community of friends and family.   asked me to be blunt and tell them exactly what I was thinking was happening with the patient.  I told them that I was worried time was short, and could be on the order of weeks to months.  Has thanked me for being direct with them.    Patient and family needing more time to talk about these options amongst themselves and we made a plan to check in tomorrow morning to learn how they were thinking about next steps.  7/24/24  With patient and Dr. Pichardo:  -I had a long and productive conversation with Ms. Hawley about her current situation.  She was able to explain to us why she ended up in the hospital.  She said that she was increasingly weak, wanting to sleep all the time, without any appetite, constant nausea.  She said that recently all she has wanted to do is stay in bed and sleep but that her  became worried and frustrated with her and convinced her to go to the hospital.  She said that it was actually her preference to not come back to the hospital that he ultimately forced her to go.  She is repeatedly questioning what could have happened recently to have triggered all of the symptoms, wondering out loud if it is a medication that she has taken or a recent fall that she had.  I shared my worry that her symptoms are likely secondary to her worsening heart failure and she was able to acknowledge that this could be the case.  There is a lot of  tension in her because of what her body is wanting to do at this point in time (rest/sleep) and what she feels like she should be doing to support her family.  She has also largely tried to shield her family and friends from the severity of her diagnosis which has left her increasingly isolated from them.  I explained that it would be reasonable to consider enrolling in hospice care upon discharge as this would allow her to stay at home with extra support and patient was definitely receptive to this discussion and possibility.  She seems worried about her family's acceptance of this plan however.    Active symptoms  -Pain: none at this time    -Nausea: consider zofran 4 mg PO around the clock given persistence of nausea    -Constipation: well-controlled on bowel regimen    -Dyspnea mild.  Dr. Michel had recommended a low-dose fentanyl patch at the last visit but in discussion with the patient she never started it as she was worried about sedation.    -Depression:  Patient's mood does seem flat to me.  On review of Dr. Michel's most recent note, patient was supposed to be on Zoloft 100 mg daily but I do not see that she is getting that medication here.  Would restart.    -Anorexia:  Patient had actually eaten a decent amount of her lunch when I was in the room.  Suspect that anorexia secondary to her end-stage heart failure.  I do not recommend pharmacologic management of this condition.    Summary/Recommendation:  -Most important goals at this time: see family meeting notes above  -patient and family seem to be coming to terms with the possibility that patient's condition has worsened and she is in fact now in a different place medically.  They are considering hospice care but I do not know whether they will ultimately enroll.  -Code status: FULL - not addressed in meeting - would defer discussions for now  -Most appropriate disposition: ideally home with hospice          I will follow-up with patient. Please contact us  if you have any additional questions.    Subjective:     Chief Complaint: No chief complaint on file.      HPI:   57 y/o f, h/o NICM (EF 10%)  s/p AICD placement on palliative dobutamine infusion, Afib (on amiodarone and eliquis), CKD, HTN, diabetes, not a candidate for advanced options, followed in the [facility] outpatient palliative care clinic, transferred to [facility] from [facility] yesterday for decompensated heart failure.  Patient reports worsening fatigue, nausea, anorexia, shortness of breath for the past several days.  She states that she has not wanted to do anything and has only wanted to stay in bed which has been very frustrating to her .  On arrival here her labs show an elevated BNP and troponin that are chronic for her.  Her creatinine is actually improved from her baseline.  She has been continued on her outpatient medication.    Hospital Course:  No notes on file    Interval:   -renal function has worsened since yesterday but near baseline for pt  -reports feeling slightly better than yesterday in terms of nausea    Past Medical History:   Diagnosis Date    Allergy     Anemia     Arthritis     Atrial fibrillation     OAC    BMI 32.0-32.9,adult 02/22/2023    Chronic respiratory failure with hypoxia, on home oxygen therapy     2L with activity, off at rest.  Per Pulm  no overt evidence of ILD or COPD on PFTs and CT to explain O2 needs.    CKD (chronic kidney disease), stage IV 05/08/2018    Congestive heart failure     s/p AICD placement,    Deep vein thrombosis     Depression     elevated bilirubin d/t Gilbert's syndrome     confirmed by [place] genetic testing, evaluated by hepatology    Encounter for blood transfusion     GERD (gastroesophageal reflux disease)     Hypertension     Pheochromocytoma, malignant     Right kidney mass     Sleep apnea     Thalassemia trait, alpha     Thyroid disease     Type 2 diabetes mellitus with hyperglycemia, without long-term current use of insulin  08/13/2020       Past Surgical History:   Procedure Laterality Date    ANGIOGRAM, ABDOMINAL AORTA Right 04/15/2024    APPENDECTOMY      BONE MARROW BIOPSY      CARDIAC DEFIBRILLATOR PLACEMENT Left 12/2016    CARDIAC ELECTROPHYSIOLOGY MAPPING AND ABLATION      CARDIAC ELECTROPHYSIOLOGY MAPPING AND ABLATION      COLONOSCOPY N/A 05/06/2022    Procedure: COLONOSCOPY;  Surgeon: Arely Betancourt MD;  Location: [facility] ENDO (2ND FLR);  Service: Endoscopy;  Laterality: N/A;  heart transplant candidate/ EF 25% - 2nd floor/ defib - Biotronik - ERW  Eliquis - per Dr. Cortez with [facility], Pt ok to hold Eliquis x 2 days prior-see media tab-outside correspondence dated 12/30/21  - ERW  verbal instructions/portal instructions/email instructions - s    EYE SURGERY      due to running tears    FRACTURE SURGERY Left     hand 5th digit    HYSTERECTOMY      KNEE SURGERY Left 2016    hematoma    LIVER BIOPSY  10/24/2018    Minimal steatosis, predominantly macrovesicular, 1%, Minimal nonspecific portal inflammation, no fibrosis. No findings on biopsy to explain elevated bilirubin levels. Could be d/t Gilbert's =?- hemolysis    RIGHT HEART CATHETERIZATION Right 12/07/2021    Procedure: INSERTION, CATHETER, RIGHT HEART;  Surgeon: Irving Cardenas MD;  Location: [facility] CATH LAB;  Service: Cardiology;  Laterality: Right;    RIGHT HEART CATHETERIZATION Right 12/19/2022    Procedure: INSERTION, CATHETER, RIGHT HEART;  Surgeon: Burke Camilo MD;  Location: [facility] CATH LAB;  Service: Cardiology;  Laterality: Right;    RIGHT HEART CATHETERIZATION Right 03/29/2023    Procedure: INSERTION, CATHETER, RIGHT HEART;  Surgeon: Katie Liriano DO;  Location: [facility] CATH LAB;  Service: Cardiology;  Laterality: Right;    TRANSJUGULAR BIOPSY OF LIVER N/A 10/24/2018    Procedure: BIOPSY, LIVER, TRANSJUGULAR APPROACH;  Surgeon: Carmen Diagnostic Provider;  Location: [facility] OR 2ND FLR;  Service: Radiology;  Laterality: N/A;       Review of patient's  "allergies indicates:   Allergen Reactions    Penicillins Hives and Other (See Comments)    Iodinated contrast media Nausea And Vomiting    Oxycodone-acetaminophen Other (See Comments)     Nausea, Dizziness, Anxiety.  "I don't like how it makes me feel."   Given Hydromorphone 0.5mg IVP  Without problems.  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 Metabolism of other drugs not listed here may also be impacted by UGT1A1 enzyme activity.       Tramadol Nausea And Vomiting and Other (See Comments)     Other reaction(s): Other (See Comments)    Valsartan Other (See Comments)       Medications:  Continuous Infusions:   DOBUTamine IV infusion (non-titrating)  5 mcg/kg/min Intravenous Continuous 6.1 mL/hr at 07/24/24 0512 5 mcg/kg/min at 07/24/24 0512     Scheduled Meds:   allopurinoL  300 mg Oral Daily    amiodarone  200 mg Oral Daily    apixaban  2.5 mg Oral BID    aspirin  81 mg Oral Daily    atorvastatin  40 mg Oral QHS    bumetanide  2 mg Oral QHS    bumetanide  4 mg Oral Daily    empagliflozin  10 mg Oral Daily    hydrALAZINE 10 mg 1 tablet, hydrALAZINE 25 mg 1 tablet, isorsorbide dinitrate 20 mg 1 tablet combination   Oral TID    magnesium oxide  400 mg Oral QHS    metOLazone  5 mg Oral Daily    pantoprazole  40 mg Oral Daily    polyethylene glycol  17 g Oral Daily    pregabalin  50 mg Oral QHS    tiotropium bromide  2 puff Inhalation Daily     PRN Meds:  Current " Facility-Administered Medications:     acetaminophen, 650 mg, Oral, Q6H PRN    benzonatate, 100 mg, Oral, TID PRN    dextrose 10%, 12.5 g, Intravenous, PRN    dextrose 10%, 25 g, Intravenous, PRN    dicyclomine, 20 mg, Oral, TID PRN    glucagon (human recombinant), 1 mg, Intramuscular, PRN    glucose, 16 g, Oral, PRN    glucose, 24 g, Oral, PRN    HYDROmorphone, 0.5 mg, Intravenous, Q4H PRN    insulin aspart U-100, 0-5 Units, Subcutaneous, QID (AC + HS) PRN    ipratropium, 0.5 mg, Nebulization, Q4H PRN    naloxone, 0.02 mg, Intravenous, PRN    ondansetron, 4 mg, Intravenous, Q6H PRN    promethazine-codeine 6.25-10 mg/5 ml, 10 mL, Oral, Q6H PRN    sodium chloride 0.9%, 10 mL, Intravenous, Q12H PRN    sodium chloride 0.9%, 10 mL, Intravenous, PRN    Family History       Problem Relation (Age of Onset)    Cancer Mother    Cirrhosis Brother    Diabetes Brother    Heart attack Father    Heart disease Father, Sister, Brother, Sister, Brother    Hypertension Father, Brother          Tobacco Use    Smoking status: Never    Smokeless tobacco: Never   Substance and Sexual Activity    Alcohol use: Not Currently     Comment: up to 1 yr ago drank 2-3 drinks on occasion but sporadic    Drug use: No    Sexual activity: Yes     Partners: Male       Review of Systems   Constitutional:  Positive for activity change, appetite change and fatigue.   Respiratory:  Positive for shortness of breath.    Gastrointestinal:  Positive for nausea. Negative for constipation, diarrhea and vomiting.   Neurological:  Positive for weakness.   Psychiatric/Behavioral:  Positive for sleep disturbance.      Objective:     Vital Signs (Most Recent):  Temp: 97.3 °F (36.3 °C) (07/25/24 1121)  Pulse: 92 (07/25/24 1121)  Resp: 19 (07/25/24 1121)  BP: 107/68 (07/25/24 1121)  SpO2: 96 % (07/25/24 1121) Vital Signs (24h Range):  Temp:  [97.3 °F (36.3 °C)-98.2 °F (36.8 °C)] 97.3 °F (36.3 °C)  Pulse:  [76-92] 92  Resp:  [18-19] 19  SpO2:  [96 %-98 %] 96 %  BP:  (102-108)/(68-77) 107/68     Weight: 81.9 kg (180 lb 8.9 oz)  Body mass index is 29.14 kg/m².       Physical Exam  Vitals and nursing note reviewed. Exam conducted with a chaperone present.   Constitutional:       General: She is awake.      Appearance: She is not toxic-appearing.      Comments: Pt sitting in recliner   Eyes:      Conjunctiva/sclera: Conjunctivae normal.   Pulmonary:      Effort: Pulmonary effort is normal.   Musculoskeletal:         General: No swelling.   Skin:     General: Skin is warm.   Neurological:      Comments: Awake, engaged, able to answer all of our questions appropriately   Psychiatric:         Attention and Perception: Attention normal.         Mood and Affect: Mood is depressed. Affect is blunt.         Speech: Speech normal.            Review of Symptoms      Symptom Assessment (ESAS 0-10 Scale)  Pain:  0  Dyspnea:  0  Anxiety:  0  Nausea:  5  Depression:  0  Anorexia:  5  Fatigue:  8  Insomnia:  0  Restlessness:  0  Agitation:  0       Constipation:  No constipation    Psychosocial/Cultural:   See Palliative Psychosocial Note: Yes  **Primary  to Follow**  Palliative Care  Consult: Yes        Advance Care Planning  Advance Directives:   Living Will: No    [place]: No    Do Not Resuscitate Status: No    Medical Power of : Yes      Decision Making:  Patient answered questions  Goals of Care: What is most important right now is to focus on curative/life-prolongation (regardless of treatment burdens), remaining as independent as possible, symptom/pain control. Accordingly, we have decided that the best plan to meet the patient's goals includes continuing with treatment.         CBC:   Recent Labs   Lab 07/25/24  0304   WBC 5.07   HGB 9.9*   HCT 34.7*   MCV 68*        BMP:  Recent Labs   Lab 07/25/24  0304   GLU 88      K 5.0      CO2 24   BUN 98*   CREATININE 3.8*   CALCIUM 8.6*   MG 2.2     LFT:  Lab Results   Component Value Date     AST 20 07/22/2024    ALKPHOS 51 (L) 07/22/2024    BILITOT 2.4 (H) 07/22/2024     Albumin:   Albumin   Date Value Ref Range Status   07/22/2024 3.7 3.5 - 5.2 g/dL Final     Protein:   Total Protein   Date Value Ref Range Status   07/22/2024 6.3 6.0 - 8.4 g/dL Final     Lactic acid:   Lab Results   Component Value Date    LACTATE 2.1 06/27/2024    LACTATE 1.1 05/05/2024         Sophia Franklin MD  Palliative Medicine  [facility] - Cardiology Stepdown

## 2024-07-25 NOTE — PLAN OF CARE
Problem: Adult Inpatient Plan of Care  Goal: Plan of Care Review  Outcome: Progressing     Problem: Diabetes Comorbidity  Goal: Blood Glucose Level Within Targeted Range  Outcome: Progressing     Problem: Infection  Goal: Absence of Infection Signs and Symptoms  Outcome: Progressing     Problem: Adult Inpatient Plan of Care  Goal: Absence of Hospital-Acquired Illness or Injury  Outcome: Progressing

## 2024-07-25 NOTE — ASSESSMENT & PLAN NOTE
- Creatine (at baseline) stable for now. BMP reviewed. Estimated Creatinine Clearance: 17.4 mL/min (A) (based on SCr of 3.8 mg/dL (H)). according to latest data. Based on current GFR, CKD stage is stage 4 - GFR 15-29.     - Monitor UOP and serial BMP and adjust therapy as needed. Renally dose meds. Avoid nephrotoxic medications and procedures.    Creatinine   Date Value Ref Range Status   07/25/2024 3.8 (H) 0.5 - 1.4 mg/dL Final     BUN   Date Value Ref Range Status   07/25/2024 98 (H) 6 - 20 mg/dL Final     eGFR   Date Value Ref Range Status   07/25/2024 13.2 (A) >60 mL/min/1.73 m^2 Final

## 2024-07-25 NOTE — SUBJECTIVE & OBJECTIVE
Interval:   -renal function has worsened since yesterday but near baseline for pt  -reports feeling slightly better than yesterday in terms of nausea    Past Medical History:   Diagnosis Date    Allergy     Anemia     Arthritis     Atrial fibrillation     OAC    BMI 32.0-32.9,adult 02/22/2023    Chronic respiratory failure with hypoxia, on home oxygen therapy     2L with activity, off at rest.  Per Pulm  no overt evidence of ILD or COPD on PFTs and CT to explain O2 needs.    CKD (chronic kidney disease), stage IV 05/08/2018    Congestive heart failure     s/p AICD placement,    Deep vein thrombosis     Depression     elevated bilirubin d/t Gilbert's syndrome     confirmed by [place] genetic testing, evaluated by hepatology    Encounter for blood transfusion     GERD (gastroesophageal reflux disease)     Hypertension     Pheochromocytoma, malignant     Right kidney mass     Sleep apnea     Thalassemia trait, alpha     Thyroid disease     Type 2 diabetes mellitus with hyperglycemia, without long-term current use of insulin 08/13/2020       Past Surgical History:   Procedure Laterality Date    ANGIOGRAM, ABDOMINAL AORTA Right 04/15/2024    APPENDECTOMY      BONE MARROW BIOPSY      CARDIAC DEFIBRILLATOR PLACEMENT Left 12/2016    CARDIAC ELECTROPHYSIOLOGY MAPPING AND ABLATION      CARDIAC ELECTROPHYSIOLOGY MAPPING AND ABLATION      COLONOSCOPY N/A 05/06/2022    Procedure: COLONOSCOPY;  Surgeon: Arely Betancourt MD;  Location: [facility] ([address] [place] [address]);  Service: Endoscopy;  Laterality: N/A;  heart transplant candidate/ EF 25% - 2nd floor/ defib - Biotronik - ERW  Eliquis - per Dr. Cortez with [facility], Pt ok to hold Eliquis x 2 days prior-see media tab-outside correspondence dated 12/30/21  - ERW  verbal instructions/portal instructions/email instructions - s    EYE SURGERY      due to running tears    FRACTURE SURGERY Left     hand 5th digit    HYSTERECTOMY      KNEE SURGERY Left 2016    hematoma    LIVER BIOPSY  10/24/2018     "Minimal steatosis, predominantly macrovesicular, 1%, Minimal nonspecific portal inflammation, no fibrosis. No findings on biopsy to explain elevated bilirubin levels. Could be d/t Gilbert's =?- hemolysis    RIGHT HEART CATHETERIZATION Right 12/07/2021    Procedure: INSERTION, CATHETER, RIGHT HEART;  Surgeon: Irving Cardenas MD;  Location: [facility] CATH LAB;  Service: Cardiology;  Laterality: Right;    RIGHT HEART CATHETERIZATION Right 12/19/2022    Procedure: INSERTION, CATHETER, RIGHT HEART;  Surgeon: Burke Camilo MD;  Location: [facility] CATH LAB;  Service: Cardiology;  Laterality: Right;    RIGHT HEART CATHETERIZATION Right 03/29/2023    Procedure: INSERTION, CATHETER, RIGHT HEART;  Surgeon: Katie Liriano DO;  Location: [facility] CATH LAB;  Service: Cardiology;  Laterality: Right;    TRANSJUGULAR BIOPSY OF LIVER N/A 10/24/2018    Procedure: BIOPSY, LIVER, TRANSJUGULAR APPROACH;  Surgeon: Carmen Diagnostic Provider;  Location: [facility] OR [address] [place] [address];  Service: Radiology;  Laterality: N/A;       Review of patient's allergies indicates:   Allergen Reactions    Penicillins Hives and Other (See Comments)    Iodinated contrast media Nausea And Vomiting    Oxycodone-acetaminophen Other (See Comments)     Nausea, Dizziness, Anxiety.  "I don't like how it makes me feel."   Given Hydromorphone 0.5mg IVP  Without problems.  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 Metabolism of other " drugs not listed here may also be impacted by UGT1A1 enzyme activity.       Tramadol Nausea And Vomiting and Other (See Comments)     Other reaction(s): Other (See Comments)    Valsartan Other (See Comments)       Medications:  Continuous Infusions:   DOBUTamine IV infusion (non-titrating)  5 mcg/kg/min Intravenous Continuous 6.1 mL/hr at 07/24/24 0512 5 mcg/kg/min at 07/24/24 0512     Scheduled Meds:   allopurinoL  300 mg Oral Daily    amiodarone  200 mg Oral Daily    apixaban  2.5 mg Oral BID    aspirin  81 mg Oral Daily    atorvastatin  40 mg Oral QHS    bumetanide  2 mg Oral QHS    bumetanide  4 mg Oral Daily    empagliflozin  10 mg Oral Daily    hydrALAZINE 10 mg 1 tablet, hydrALAZINE 25 mg 1 tablet, isorsorbide dinitrate 20 mg 1 tablet combination   Oral TID    magnesium oxide  400 mg Oral QHS    metOLazone  5 mg Oral Daily    pantoprazole  40 mg Oral Daily    polyethylene glycol  17 g Oral Daily    pregabalin  50 mg Oral QHS    tiotropium bromide  2 puff Inhalation Daily     PRN Meds:  Current Facility-Administered Medications:     acetaminophen, 650 mg, Oral, Q6H PRN    benzonatate, 100 mg, Oral, TID PRN    dextrose 10%, 12.5 g, Intravenous, PRN    dextrose 10%, 25 g, Intravenous, PRN    dicyclomine, 20 mg, Oral, TID PRN    glucagon (human recombinant), 1 mg, Intramuscular, PRN    glucose, 16 g, Oral, PRN    glucose, 24 g, Oral, PRN    HYDROmorphone, 0.5 mg, Intravenous, Q4H PRN    insulin aspart U-100, 0-5 Units, Subcutaneous, QID (AC + HS) PRN    ipratropium, 0.5 mg, Nebulization, Q4H PRN    naloxone, 0.02 mg, Intravenous, PRN    ondansetron, 4 mg, Intravenous, Q6H PRN    promethazine-codeine 6.25-10 mg/5 ml, 10 mL, Oral, Q6H PRN    sodium chloride 0.9%, 10 mL, Intravenous, Q12H PRN    sodium chloride 0.9%, 10 mL, Intravenous, PRN    Family History       Problem Relation (Age of Onset)    Cancer Mother    Cirrhosis Brother    Diabetes Brother    Heart attack Father    Heart disease Father, Sister, Brother,  Sister, Brother    Hypertension Father, Brother          Tobacco Use    Smoking status: Never    Smokeless tobacco: Never   Substance and Sexual Activity    Alcohol use: Not Currently     Comment: up to 1 yr ago drank 2-3 drinks on occasion but sporadic    Drug use: No    Sexual activity: Yes     Partners: Male       Review of Systems   Constitutional:  Positive for activity change, appetite change and fatigue.   Respiratory:  Positive for shortness of breath.    Gastrointestinal:  Positive for nausea. Negative for constipation, diarrhea and vomiting.   Neurological:  Positive for weakness.   Psychiatric/Behavioral:  Positive for sleep disturbance.      Objective:     Vital Signs (Most Recent):  Temp: 97.3 °F (36.3 °C) (07/25/24 1121)  Pulse: 92 (07/25/24 1121)  Resp: 19 (07/25/24 1121)  BP: 107/68 (07/25/24 1121)  SpO2: 96 % (07/25/24 1121) Vital Signs (24h Range):  Temp:  [97.3 °F (36.3 °C)-98.2 °F (36.8 °C)] 97.3 °F (36.3 °C)  Pulse:  [76-92] 92  Resp:  [18-19] 19  SpO2:  [96 %-98 %] 96 %  BP: (102-108)/(68-77) 107/68     Weight: 81.9 kg (180 lb 8.9 oz)  Body mass index is 29.14 kg/m².       Physical Exam  Vitals and nursing note reviewed. Exam conducted with a chaperone present.   Constitutional:       General: She is awake.      Appearance: She is not toxic-appearing.      Comments: Pt sitting in recliner   Eyes:      Conjunctiva/sclera: Conjunctivae normal.   Pulmonary:      Effort: Pulmonary effort is normal.   Musculoskeletal:         General: No swelling.   Skin:     General: Skin is warm.   Neurological:      Comments: Awake, engaged, able to answer all of our questions appropriately   Psychiatric:         Attention and Perception: Attention normal.         Mood and Affect: Mood is depressed. Affect is blunt.         Speech: Speech normal.            Review of Symptoms      Symptom Assessment (ESAS 0-10 Scale)  Pain:  0  Dyspnea:  0  Anxiety:  0  Nausea:  5  Depression:  0  Anorexia:  5  Fatigue:   8  Insomnia:  0  Restlessness:  0  Agitation:  0       Constipation:  No constipation    Psychosocial/Cultural:   See Palliative Psychosocial Note: Yes  **Primary  to Follow**  Palliative Care  Consult: Yes        Advance Care Planning   Advance Directives:   Living Will: No    LaPOST: No    Do Not Resuscitate Status: No    Medical Power of : Yes      Decision Making:  Patient answered questions  Goals of Care: What is most important right now is to focus on curative/life-prolongation (regardless of treatment burdens), remaining as independent as possible, symptom/pain control. Accordingly, we have decided that the best plan to meet the patient's goals includes continuing with treatment.         CBC:   Recent Labs   Lab 07/25/24 0304   WBC 5.07   HGB 9.9*   HCT 34.7*   MCV 68*        BMP:  Recent Labs   Lab 07/25/24 0304   GLU 88      K 5.0      CO2 24   BUN 98*   CREATININE 3.8*   CALCIUM 8.6*   MG 2.2     LFT:  Lab Results   Component Value Date    AST 20 07/22/2024    ALKPHOS 51 (L) 07/22/2024    BILITOT 2.4 (H) 07/22/2024     Albumin:   Albumin   Date Value Ref Range Status   07/22/2024 3.7 3.5 - 5.2 g/dL Final     Protein:   Total Protein   Date Value Ref Range Status   07/22/2024 6.3 6.0 - 8.4 g/dL Final     Lactic acid:   Lab Results   Component Value Date    LACTATE 2.1 06/27/2024    LACTATE 1.1 05/05/2024

## 2024-07-25 NOTE — ASSESSMENT & PLAN NOTE
Anemia is likely due to chronic disease due to Chronic Kidney Disease. Most recent hemoglobin and hematocrit are listed below.  Recent Labs     07/22/24  2209 07/24/24  0432 07/25/24  0304   HGB 11.0* 10.6* 9.9*   HCT 36.7* 36.6* 34.7*     Plan  - Monitor serial CBC: Daily  - Transfuse PRBC if patient becomes hemodynamically unstable, symptomatic or H/H drops below 7/21.  - Patient Has not received any PRBCs to date  - Patient's anemia is currently  stable

## 2024-07-25 NOTE — PLAN OF CARE
Advance Care Planning   [facility] - Cardiology Stepdown  Palliative Care   Psychosocial Assessment    Patient Name: Hafsa Hawley  MRN: [id number]  Admission Date: 7/24/2024  Hospital Length of Stay: 1 days  Code Status: Full Code   Attending Provider: Nils Damico III*  Palliative Care Provider:   Primary Care Physician: Cristobal Ann MD  Principal Problem:End stage heart failure    Reason for Referral: assistance with clarification of goals of care, facilitation of Family Care Conference, and hospice referral or discussion  Consult Order Date:   Primary CM/SW:    Present during Interview: patient, spouse/SO, past medical records, ER records, and primary team.      Primary Language:English   Needed: no      Past Medical Situation:   PMH:   Past Medical History:   Diagnosis Date    Allergy     Anemia     Arthritis     Atrial fibrillation     OAC    BMI 32.0-32.9,adult 02/22/2023    Chronic respiratory failure with hypoxia, on home oxygen therapy     2L with activity, off at rest.  Per Pulm  no overt evidence of ILD or COPD on PFTs and CT to explain O2 needs.    CKD (chronic kidney disease), stage IV 05/08/2018    Congestive heart failure     s/p AICD placement,    Deep vein thrombosis     Depression     elevated bilirubin d/t Gilbert's syndrome     confirmed by [place] genetic testing, evaluated by hepatology    Encounter for blood transfusion     GERD (gastroesophageal reflux disease)     Hypertension     Pheochromocytoma, malignant     Right kidney mass     Sleep apnea     Thalassemia trait, alpha     Thyroid disease     Type 2 diabetes mellitus with hyperglycemia, without long-term current use of insulin 08/13/2020     Mental Health/Substance Use History: depression   Risk of Abuse, neglect or exploitation: none disclosed   Current or Previous Trauma and/or evidence of PTSD: none disclosed   Non-traditional Health practices: none disclosed     Understanding of diagnosis and  prognosis: moderate   Experience/Comfort level with health care system: poor     Patients Mental Status: AAOx3     Socio-Economic Factors/Resources:  Address: [address] [place] [address]  Phone Number: [phone] (home)     Marital Status:   Household composition: pt lives with    Children: 2    Patient/Family perceptions about Caregiving Needs; availability and capacity: pt/family aware of increased care needs    Family Structure, Dynamics/Relationships: pt has large, supportive family     Patterns/Styles of Communication and Decision-making in the Family: pt is decisional; with input from      Patient/Family Strengths/Resilience: faithful, hopeful   Patient/Family Coping: appropriate    Activities of Daily Living: requiring assistance as of the last week   Support Systems-Family & Community (Home Health, HME etc): TBD    Transportation:  yes    Work/Education History: unemployed  Self-Care Activities/Hobbies: spending time with family and friends      History: no    Financial Resources:Medicaid      Advanced Care Planning & Legal Concerns:   Advanced Directives/Living Will: no  LaPOST/POLST: no   Planning:  no    Medical Power of : yes     Oral/Written Declaration: yes  Witnesses:   Surrogate Decision Maker:     Emergency Contacts:    Spirituality, Culture & Coping Mechanisms:  F- [place] and Belief: Church     I - Importance:     C - Community/Culture Values:     A - Address in Care:       Goals/Hopes/Expectations: to have good days   Fears/Anxiety/Concerns: further decline        Preferences about EOL Environment: (own bed, family nearby, pets, music, etc): TBD      Complicated Bereavement Risk Assessment Tool (CBRAT)  Reference:  [facility] Clinical Practice Group (May 2016). Bereavement Risk Screening and Management Guidelines.  Retrieved from:  http://[email or web address]/wp-content/uploads//UAAEW-Dupowmivugv-Stfjhghsr-and-Management-Guideline--.pdf      Bereaved Client Characteristics   Under 18      no  Was a Twin   no  Young Spouse   no  Elderly Spouse    no  Isolated    no  Lacks Meaningful Social Support   no  Dissatisfied with help available during illness   yes  New to Financial [place] no  New to Decision-Making   no    Illness  Inherited Disorder   no  Stigmatized Disease in the family/community   no  Lengthy/Burdensome   yes     Bereaved Client's History of Loss   Cumulative Multiple Losses   no  Previous Mental Health Illnesses   no  Current Mental Health Illness   no  Other Significant Health Issues   no   Migrant/Refugee   no Will the Death be:  Sudden or Unexpected   no  Traumatic Circumstances Associated with Death   no  Significant Cultural/Social Burdens as a result of Death   no   Relationship with   Profound Lifelong Partner   yes  Highly Dependent    no  Antagonistic   no  Ambivalent   no  Deeply Connected   yes  Culturally Defined   yes   Risk Factors Scores  0-2  Low  3-5  Moderate  5+  High  All persons scoring moderate to high presume to be at risk**    (** It is acknowledged that protective factors and resilience may outweigh apparent risk factors.      Total Risk Factors Score:   high, with protective factors       Advance Care Planning     Date: 2024    Hospice  I did explain the role for hospice care at this stage of the patient's illness, including its ability to help the patient live with the best quality of life possible.  Family to discuss if they would like to pursue hospice care upon discharge. Will regroup tomorrow.         Discharge Planning Needs/Plan of Care:     LCSW, along with Dr. Franklin and Dr. Pichardo, met with pt, her , and other relative at bedside. Pt AAOx3, with flat affect and lethargy noted. Pt shares that she has been feeling much weaker, tired, having increased  SOB, and other symptoms of end stage heart failure that have increased over the last week. Pt states that she has been spending all day in bed and sleeping more because she feels as though she cannot do much more than that.  has been trying to encourage pt to get up and move around more, which she hasn't been able to do. Pt shares being declined for a heart tx twice a year ago, and the upset that has caused her and her family. Pt's  states that he is interested in pursuing a transfer to a hospital in [place] where his nephew received care when he was told locally that there was nothing more that they could do for him. Explained that a transfer to [place] with [place] Medicaid wouldn't be approved. Shared our worry that pt is declining and will continue to decline, and recommended hospice care upon discharge.  initially very apprehensive, stating that they're going to keep pt in the bed, encourage her to do nothing, and that's not what they want. Explained that the objective of hospice is to encourage pts to do as much as they are able, to make every day the best day it can be. Reinforced that they would not be losing anything in terms of pts medication.  concerned about not returning to the hospital. Explained that hospice can do everything being done here, but at home, where she can spend time with her family and friends who love her dearly. Pt and  tearful but appropriate, and will discuss their next steps tonight. Will regroup tomorrow.    Verónica Brock, GEORGE-JAYS, [facility]-  Department of Palliative Medicine

## 2024-07-25 NOTE — HOSPITAL COURSE
Transferred from OSH for HTS eval of advanced options for ESHF. She mainly c/o chronic malaise, likely 2/2 progression of ESHF & CKD 5, manifesting as cough & difficulty sleeping/resting. Upon review of prior HTS & Palliative notes, she has been extensively worked up for advanced options & has been denied Htrx in the past 2/2 c/f noncompliance. HTS & Palliative Care consulted for assistance w/ progression of end-stage disease.    Renal function noted to be worsening, likely 2/2 progression of CKD; no sxs of uremia or urgent indications for HD. US RP w/o obstruction.    After discussion of life-limiting illness, pt decided to go home w/ hospice. D/c on promethazine-codeine for cough. Continue all other home meds as prescribed. Medically stable for d/c home w/ hospice.

## 2024-07-25 NOTE — ASSESSMENT & PLAN NOTE
History of non-ischemic cardiomyopathy s/p AICD placement on palliative dobutamine and not a candidate for advanced therapies. Patient currently pursuing heart transplant options.    - Continue dobutamine drip 5mcg/kg/min   - Patient follows palliative medicine outpatient  - Heart transplant team consulted 7/25

## 2024-07-26 VITALS
HEART RATE: 97 BPM | WEIGHT: 179.44 LBS | RESPIRATION RATE: 18 BRPM | BODY MASS INDEX: 28.96 KG/M2 | SYSTOLIC BLOOD PRESSURE: 110 MMHG | DIASTOLIC BLOOD PRESSURE: 67 MMHG | TEMPERATURE: 98 F | OXYGEN SATURATION: 97 %

## 2024-07-26 LAB
ANION GAP SERPL CALC-SCNC: 16 MMOL/L (ref 8–16)
ANISOCYTOSIS BLD QL SMEAR: ABNORMAL
BASOPHILS # BLD AUTO: 0.02 K/UL (ref 0–0.2)
BASOPHILS NFR BLD: 0.4 % (ref 0–1.9)
BUN SERPL-MCNC: 99 MG/DL (ref 6–20)
CALCIUM SERPL-MCNC: 8.5 MG/DL (ref 8.7–10.5)
CHLORIDE SERPL-SCNC: 99 MMOL/L (ref 95–110)
CO2 SERPL-SCNC: 23 MMOL/L (ref 23–29)
CREAT SERPL-MCNC: 3.9 MG/DL (ref 0.5–1.4)
DIFFERENTIAL METHOD BLD: ABNORMAL
EOSINOPHIL # BLD AUTO: 0.2 K/UL (ref 0–0.5)
EOSINOPHIL NFR BLD: 3.5 % (ref 0–8)
ERYTHROCYTE [DISTWIDTH] IN BLOOD BY AUTOMATED COUNT: 31.7 % (ref 11.5–14.5)
EST. GFR  (NO RACE VARIABLE): 12.8 ML/MIN/1.73 M^2
GLUCOSE SERPL-MCNC: 135 MG/DL (ref 70–110)
HCT VFR BLD AUTO: 34.4 % (ref 37–48.5)
HGB BLD-MCNC: 9.8 G/DL (ref 12–16)
HOWELL-JOLLY BOD BLD QL SMEAR: ABNORMAL
IMM GRANULOCYTES # BLD AUTO: 0.05 K/UL (ref 0–0.04)
IMM GRANULOCYTES NFR BLD AUTO: 1 % (ref 0–0.5)
LYMPHOCYTES # BLD AUTO: 0.8 K/UL (ref 1–4.8)
LYMPHOCYTES NFR BLD: 16.1 % (ref 18–48)
MAGNESIUM SERPL-MCNC: 2.3 MG/DL (ref 1.6–2.6)
MCH RBC QN AUTO: 19.4 PG (ref 27–31)
MCHC RBC AUTO-ENTMCNC: 28.5 G/DL (ref 32–36)
MCV RBC AUTO: 68 FL (ref 82–98)
MONOCYTES # BLD AUTO: 0.5 K/UL (ref 0.3–1)
MONOCYTES NFR BLD: 9.9 % (ref 4–15)
NEUTROPHILS # BLD AUTO: 3.3 K/UL (ref 1.8–7.7)
NEUTROPHILS NFR BLD: 69.1 % (ref 38–73)
NRBC BLD-RTO: 6 /100 WBC
OVALOCYTES BLD QL SMEAR: ABNORMAL
PHOSPHATE SERPL-MCNC: 4.9 MG/DL (ref 2.7–4.5)
PLATELET # BLD AUTO: 146 K/UL (ref 150–450)
PMV BLD AUTO: ABNORMAL FL (ref 9.2–12.9)
POCT GLUCOSE: 122 MG/DL (ref 70–110)
POCT GLUCOSE: 128 MG/DL (ref 70–110)
POCT GLUCOSE: 131 MG/DL (ref 70–110)
POCT GLUCOSE: 156 MG/DL (ref 70–110)
POCT GLUCOSE: 179 MG/DL (ref 70–110)
POIKILOCYTOSIS BLD QL SMEAR: ABNORMAL
POLYCHROMASIA BLD QL SMEAR: ABNORMAL
POTASSIUM SERPL-SCNC: 3.9 MMOL/L (ref 3.5–5.1)
RBC # BLD AUTO: 5.04 M/UL (ref 4–5.4)
SCHISTOCYTES BLD QL SMEAR: ABNORMAL
SODIUM SERPL-SCNC: 138 MMOL/L (ref 136–145)
SPHEROCYTES BLD QL SMEAR: ABNORMAL
WBC # BLD AUTO: 4.84 K/UL (ref 3.9–12.7)

## 2024-07-26 PROCEDURE — 80048 BASIC METABOLIC PNL TOTAL CA: CPT | Performed by: HOSPITALIST

## 2024-07-26 PROCEDURE — 85025 COMPLETE CBC W/AUTO DIFF WBC: CPT

## 2024-07-26 PROCEDURE — G0378 HOSPITAL OBSERVATION PER HR: HCPCS

## 2024-07-26 PROCEDURE — 83735 ASSAY OF MAGNESIUM: CPT

## 2024-07-26 PROCEDURE — 27000221 HC OXYGEN, UP TO 24 HOURS

## 2024-07-26 PROCEDURE — 25000003 PHARM REV CODE 250

## 2024-07-26 PROCEDURE — 84100 ASSAY OF PHOSPHORUS: CPT

## 2024-07-26 PROCEDURE — 94761 N-INVAS EAR/PLS OXIMETRY MLT: CPT

## 2024-07-26 PROCEDURE — 36415 COLL VENOUS BLD VENIPUNCTURE: CPT

## 2024-07-26 PROCEDURE — 25000242 PHARM REV CODE 250 ALT 637 W/ HCPCS: Performed by: INTERNAL MEDICINE

## 2024-07-26 PROCEDURE — 94640 AIRWAY INHALATION TREATMENT: CPT

## 2024-07-26 PROCEDURE — 99900035 HC TECH TIME PER 15 MIN (STAT)

## 2024-07-26 RX ORDER — PROMETHAZINE HYDROCHLORIDE AND CODEINE PHOSPHATE 6.25; 1 MG/5ML; MG/5ML
10 SOLUTION ORAL EVERY 6 HOURS PRN
Qty: 118 ML | Refills: 0 | Status: SHIPPED | OUTPATIENT
Start: 2024-07-26 | End: 2024-07-26

## 2024-07-26 RX ORDER — PROMETHAZINE HYDROCHLORIDE AND CODEINE PHOSPHATE 6.25; 1 MG/5ML; MG/5ML
5 SOLUTION ORAL EVERY 6 HOURS PRN
Qty: 240 ML | Refills: 2 | Status: SHIPPED | OUTPATIENT
Start: 2024-07-26 | End: 2024-08-31

## 2024-07-26 RX ADMIN — TIOTROPIUM BROMIDE INHALATION SPRAY 2 PUFF: 3.12 SPRAY, METERED RESPIRATORY (INHALATION) at 09:07

## 2024-07-26 RX ADMIN — APIXABAN 2.5 MG: 2.5 TABLET, FILM COATED ORAL at 09:07

## 2024-07-26 RX ADMIN — AMIODARONE HYDROCHLORIDE 200 MG: 200 TABLET ORAL at 09:07

## 2024-07-26 RX ADMIN — HYDRALAZINE HYDROCHLORIDE: 10 TABLET ORAL at 09:07

## 2024-07-26 RX ADMIN — POLYETHYLENE GLYCOL 3350 17 G: 17 POWDER, FOR SOLUTION ORAL at 09:07

## 2024-07-26 RX ADMIN — ALLOPURINOL 300 MG: 300 TABLET ORAL at 09:07

## 2024-07-26 RX ADMIN — EMPAGLIFLOZIN 10 MG: 25 TABLET, FILM COATED ORAL at 09:07

## 2024-07-26 RX ADMIN — BUMETANIDE 4 MG: 1 TABLET ORAL at 09:07

## 2024-07-26 RX ADMIN — PANTOPRAZOLE SODIUM 40 MG: 40 TABLET, DELAYED RELEASE ORAL at 09:07

## 2024-07-26 RX ADMIN — ASPIRIN 81 MG: 81 TABLET, COATED ORAL at 09:07

## 2024-07-26 RX ADMIN — METOLAZONE 5 MG: 5 TABLET ORAL at 09:07

## 2024-07-26 RX ADMIN — HYDRALAZINE HYDROCHLORIDE: 10 TABLET ORAL at 03:07

## 2024-07-26 NOTE — PROGRESS NOTES
Arie Vazquez - Cardiology [facility] Medicine  Progress Note    Patient Name: Hafsa Hawley  MRN: [id number]  Patient Class: IP- Inpatient   Admission Date: 7/24/2024  Length of Stay: 2 days  Attending Physician: Nils Damico III*  Primary Care Provider: Cristobal Ann MD    Subjective:     Principal Problem:End stage heart failure    HPI:  Ms. Hafsa Hawley is a 59yo F with a PMHx of NICM s/p AICD placement on palliative dobutamine infusion, Afib (on amiodarone and eliquis), CKD, HTN, diabetes, presenting as a transfer from [facility] with a chief complaint of fatigue. Patient also complaining of coughing, chest pressure, difficulty breathing, and dizziness. Patient reports she's been feeling this way for the past week. She does not report any changes to her diet or fluid intake. She does have home oxygen which she uses as needed. She does mention that she has been out of her cough medication (promethazine-codeine) for the past month. Since, her cough has been more persistent and keeping her up at night. Associated symptoms include nausea, chronic and unchanged. She denies any headaches, fever, chills, chest pain, palpitations, vomiting, diarrhea, hematochezia, urinary symptoms, or leg swelling. She has been compliant with her medications including her GDMT medications.     ED Course at [facility]:  Afebrile and HDS. Mildly tachycardic, but otherwise VS wnl. On low flow NC for comfort, but no hypoxia. Pt states her  gtt rate was recently lowered due to weight loss. Workup most significant for hgb 11.0, normal WBC and PLTs, normal Na and K, BUN 77, Cr 2.7 (below her baseline which seems to be mid 3s-4), BG 130s, T bili 2.4, BNP 2,754 (appears to be approx her recent baseline - this month, although [id number] in June), trop 1.885 (lower than recent baseline). CXR with enlarged cardiac silhouette.  Left lung base is obscured.  Right basilar airspace disease.  Right upper extremity PICC line,  unchanged in position.  Left chest wall AICD.     She was given 80 mg IV lasix, breathing treatments, and O2 by NC for comfort in the ED. Not much UOP since receiving 80 mg IV lasix. Transfer is requested for cardiology consult. She is not yet ready to consider hospice, but is following with palliative medicine. PFC spoke to [facility] and since pt is on palliative  with no advanced options, they requested admission to hospital medicine.     Overview/Hospital Course:  Transferred from OSH for HTS eval of advanced options for ESHF. She mainly c/o chronic malaise, likely 2/2 progression of ESHF & CKD 5, manifesting as cough & difficulty sleeping/resting. Upon review of prior HTS & Palliative notes, she has been extensively worked up for advanced options & has been denied Htrx in the past 2/2 c/f noncompliance. HTS & Palliative Care consulted for assistance w/ progression of end-stage disease.    Renal function noted to be worsening, likely 2/2 progression of CKD; no sxs of uremia or urgent indications for HD. US RP w/o obstruction.    Interval History: NAEON. Vsx & labs grossly stable. No sxs of acute exac. Continuing Palliative discussions.    Review of Systems  Objective:     Vital Signs (Most Recent):  Temp: 98 °F (36.7 °C) (07/26/24 0740)  Pulse: 86 (07/26/24 0740)  Resp: 18 (07/26/24 0740)  BP: 123/74 (07/26/24 0740)  SpO2: 97 % (07/26/24 0740) Vital Signs (24h Range):  Temp:  [97.3 °F (36.3 °C)-98 °F (36.7 °C)] 98 °F (36.7 °C)  Pulse:  [82-93] 86  Resp:  [17-19] 18  SpO2:  [93 %-99 %] 97 %  BP: (107-130)/(64-84) 123/74     Weight: 82.6 kg (182 lb 1.6 oz)  Body mass index is 29.39 kg/m².    Intake/Output Summary (Last 24 hours) at 7/26/2024 0807  Last data filed at 7/26/2024 0552  Gross per 24 hour   Intake 720 ml   Output 3050 ml   Net -2330 ml         Physical Exam  Constitutional:       General: She is awake. She is not in acute distress.     Appearance: She is ill-appearing.      Comments: Laying comfortably in  bed     HENT:      Nose: Nose normal.      Mouth/Throat:      Mouth: Mucous membranes are moist.      Pharynx: Oropharynx is clear.   Eyes:      Extraocular Movements: Extraocular movements intact.      Conjunctiva/sclera: Conjunctivae normal.   Cardiovascular:      Rate and Rhythm: Normal rate.      Pulses: Normal pulses.      Heart sounds: Normal heart sounds.   Pulmonary:      Effort: Pulmonary effort is normal.      Breath sounds: Normal breath sounds. No wheezing, rhonchi or rales.      Comments: On O2 nasal cannula    Abdominal:      General: There is no distension.      Palpations: Abdomen is soft. There is no mass.      Tenderness: Tenderness: mildly in the epigastric region.   Musculoskeletal:         General: No swelling, tenderness or deformity. Normal range of motion.      Right lower leg: No edema.      Left lower leg: No edema.   Skin:     General: Skin is warm and dry.      Capillary Refill: Capillary refill takes less than 2 seconds.   Neurological:      General: No focal deficit present.      Mental Status: She is alert and oriented to person, place, and time.      Comments: Awake, engaged, able to answer all questions   Psychiatric:         Attention and Perception: Attention normal.         Mood and Affect: Mood is depressed. Affect is blunt.         Speech: Speech normal.         Behavior: Behavior normal.             Significant Labs: All pertinent labs within the past 24 hours have been reviewed.    Significant Imaging: I have reviewed all pertinent imaging results/findings within the past 24 hours.    Assessment/Plan:      * End stage heart failure  - see NICM and Chronic systolic/diastolic heart failure    Type 2 diabetes mellitus with stage 4 chronic kidney disease, without long-term current use of insulin  - Continue Jardiance  - LDSSI  - Goal glucose of 140-180 while in patient    Depression due to physical illness  - on home sertraline, however pt stated she hasn't taken it in 3 mos  - on  dispense report review, she recently refilled it on 07/10  - monitor for SSRI withdrawal     Chronic combined systolic and diastolic heart failure  Ms. Hafsa Hawley is a 59yo F with NICM s/p AICD placement on palliative dobutamine infusion, Afib (on amiodarone and eliquis), CKD, HTN, diabetes, presenting with a chief complaint of fatigue. She has a history of acute decompensated heart failure exacerbations and managed with GDMT. Most recent echo results shown below with an EF of 15%. The clinical picture during this admit does not fit a HF exacerbation. Her CXR shows cardiomegaly but does not highlight any major changes or increase in fluid volume. On exam, patient is in NAD, without any clinical signs of fluid overload. Lung examination was clear, no observable JVD, or lower extremity swelling. Her fatigue seems to be likely due to lack of rest secondary to her coughing episodes.      - HTS consulted advanced options; transferred from OSH for HTS eval  - Continue home metoprolol, jardiance, hydralazine with isosorbide dinitrate.   - Continue home bumetanide 4mg AM and 2mg PM.   - Continue home metolazone  - Weigh daily, can adjust diuretics as needed  - Strict I/Os  - Low sodium diet    Essential hypertension  - Continue home antihypertensives as BP tolerates    Fatigue  Likely due to chronic disease & poor nutrition in s/o approaching EOL.    - replace electrolytes as needed  - nutrition consulted    Nausea  - PRN ondansetron ordered    Lower extremity neuropathy  - Continue home Lyrica    Hyperlipidemia, mixed  - Continue home atorvastatin      Anemia of chronic disease  Anemia is likely due to chronic disease due to Chronic Kidney Disease. Most recent hemoglobin and hematocrit are listed below.    - Monitor serial CBC: Daily  - Transfuse PRBC if patient becomes hemodynamically unstable, symptomatic or H/H drops below 7/21.  - Patient Has not received any PRBCs to date  - Patient's anemia is currently   stable    CKD (chronic kidney disease) stage 4, GFR 15-29 ml/min  - likely progressing to ESRD  - still making urine  - US RP w/o obstruction  - no uremic sxs, lytes imbalances, acidosis, or other indications for urgent HD  - consider Neph consult, pending further Palliative discussions    Paroxysmal A-fib  History of atrial fibrillation. Stable.    - Continue home amiodarone  - Continue home eliquis  - Cardiac monitoring while inpatient    Gout, arthritis  - Continue home allopurinol    Palliative care encounter  - Palliative consulted, f/u recs    Pulmonary HTN  - monitor    ICD (implantable cardioverter-defibrillator) in place  Primary prevention. See NICM for plan.     Cough  - promethazine-codeine given  - PRN benzonatate capsules     NICM (nonischemic cardiomyopathy)  History of non-ischemic cardiomyopathy s/p AICD placement on palliative dobutamine and not a candidate for advanced therapies. Patient currently pursuing heart transplant options.    - Continue dobutamine drip 5mcg/kg/min   - Patient follows palliative medicine outpatient; Palliative consulted for assistance w/ GOC  - HTS consulted    Elevated troponin  - chronically elevated but stable  - no CP       VTE Risk Mitigation (From admission, onward)           Ordered     apixaban tablet 2.5 mg  2 times daily         07/24/24 0435     IP VTE HIGH RISK PATIENT  Once         07/24/24 0341     Place sequential compression device  Until discontinued         07/24/24 0341     Reason for No Pharmacological VTE Prophylaxis  Once        Question:  Reasons:  Answer:  Already adequately anticoagulated on oral Anticoagulants    07/24/24 0341                Discharge Planning   CHIQUI: 7/29/2024     Code Status: Full Code   Is the patient medically ready for discharge?:     Reason for patient still in hospital (select all that apply): Treatment and Consult recommendations  Discharge Plan A: Home with family, Home Health        Andrea De La Cruz MD  Department of  [facility] Medicine   Arie Vazquez - Cardiology Stepdown

## 2024-07-26 NOTE — ASSESSMENT & PLAN NOTE
- on home sertraline, however pt stated she hasn't taken it in 3 mos  - on dispense report review, she recently refilled it on 07/10  - monitor for SSRI withdrawal

## 2024-07-26 NOTE — SUBJECTIVE & OBJECTIVE
Interval:   -renal function has worsened slightly, no significant changes in overall clinical appearance    Past Medical History:   Diagnosis Date    Allergy     Anemia     Arthritis     Atrial fibrillation     OAC    BMI 32.0-32.9,adult 02/22/2023    Chronic respiratory failure with hypoxia, on home oxygen therapy     2L with activity, off at rest.  Per Pulm  no overt evidence of ILD or COPD on PFTs and CT to explain O2 needs.    CKD (chronic kidney disease), stage IV 05/08/2018    Congestive heart failure     s/p AICD placement,    Deep vein thrombosis     Depression     elevated bilirubin d/t Gilbert's syndrome     confirmed by [employer] genetic testing, evaluated by hepatology    Encounter for blood transfusion     GERD (gastroesophageal reflux disease)     Hypertension     Pheochromocytoma, malignant     Right kidney mass     Sleep apnea     Thalassemia trait, alpha     Thyroid disease     Type 2 diabetes mellitus with hyperglycemia, without long-term current use of insulin 08/13/2020       Past Surgical History:   Procedure Laterality Date    ANGIOGRAM, ABDOMINAL AORTA Right 04/15/2024    APPENDECTOMY      BONE MARROW BIOPSY      CARDIAC DEFIBRILLATOR PLACEMENT Left 12/2016    CARDIAC ELECTROPHYSIOLOGY MAPPING AND ABLATION      CARDIAC ELECTROPHYSIOLOGY MAPPING AND ABLATION      COLONOSCOPY N/A 05/06/2022    Procedure: COLONOSCOPY;  Surgeon: Arely Betancourt MD;  Location: [facility] ([address] [place] [address]);  Service: Endoscopy;  Laterality: N/A;  heart transplant candidate/ EF 25% - 2nd floor/ defib - Biotronik - ERW  Eliquis - per Dr. Cortez with [facility], Pt ok to hold Eliquis x 2 days prior-see media tab-outside correspondence dated 12/30/21  - [facility]  verbal instructions/portal instructions/email instructions - s    EYE SURGERY      due to running tears    FRACTURE SURGERY Left     hand 5th digit    HYSTERECTOMY      KNEE SURGERY Left 2016    hematoma    LIVER BIOPSY  10/24/2018    Minimal steatosis, predominantly  "macrovesicular, 1%, Minimal nonspecific portal inflammation, no fibrosis. No findings on biopsy to explain elevated bilirubin levels. Could be d/t Gilbert's =?- hemolysis    RIGHT HEART CATHETERIZATION Right 12/07/2021    Procedure: INSERTION, CATHETER, RIGHT HEART;  Surgeon: Irving Cardenas MD;  Location: [facility] CATH LAB;  Service: Cardiology;  Laterality: Right;    RIGHT HEART CATHETERIZATION Right 12/19/2022    Procedure: INSERTION, CATHETER, RIGHT HEART;  Surgeon: Burke Camilo MD;  Location: [facility] CATH LAB;  Service: Cardiology;  Laterality: Right;    RIGHT HEART CATHETERIZATION Right 03/29/2023    Procedure: INSERTION, CATHETER, RIGHT HEART;  Surgeon: Katie Liriano DO;  Location: [facility] CATH LAB;  Service: Cardiology;  Laterality: Right;    TRANSJUGULAR BIOPSY OF LIVER N/A 10/24/2018    Procedure: BIOPSY, LIVER, TRANSJUGULAR APPROACH;  Surgeon: Carmen Diagnostic Provider;  Location: [facility] OR [address] [place] [address];  Service: Radiology;  Laterality: N/A;       Review of patient's allergies indicates:   Allergen Reactions    Penicillins Hives and Other (See Comments)    Iodinated contrast media Nausea And Vomiting    Oxycodone-acetaminophen Other (See Comments)     Nausea, Dizziness, Anxiety.  "I don't like how it makes me feel."   Given Hydromorphone 0.5mg IVP  Without problems.  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 Metabolism of other drugs not listed here may also " be impacted by UGT1A1 enzyme activity.       Tramadol Nausea And Vomiting and Other (See Comments)     Other reaction(s): Other (See Comments)    Valsartan Other (See Comments)       Medications:  Continuous Infusions:   DOBUTamine IV infusion (non-titrating)  5 mcg/kg/min Intravenous Continuous 6.1 mL/hr at 07/25/24 1707 5 mcg/kg/min at 07/25/24 1707     Scheduled Meds:   allopurinoL  300 mg Oral Daily    amiodarone  200 mg Oral Daily    apixaban  2.5 mg Oral BID    aspirin  81 mg Oral Daily    atorvastatin  40 mg Oral QHS    bumetanide  2 mg Oral QHS    bumetanide  4 mg Oral Daily    empagliflozin  10 mg Oral Daily    hydrALAZINE 10 mg 1 tablet, hydrALAZINE 25 mg 1 tablet, isorsorbide dinitrate 20 mg 1 tablet combination   Oral TID    magnesium oxide  400 mg Oral QHS    metOLazone  5 mg Oral Daily    pantoprazole  40 mg Oral Daily    polyethylene glycol  17 g Oral Daily    pregabalin  50 mg Oral QHS    tiotropium bromide  2 puff Inhalation Daily     PRN Meds:  Current Facility-Administered Medications:     acetaminophen, 650 mg, Oral, Q6H PRN    benzonatate, 100 mg, Oral, TID PRN    dextrose 10%, 12.5 g, Intravenous, PRN    dextrose 10%, 25 g, Intravenous, PRN    dicyclomine, 20 mg, Oral, TID PRN    glucagon (human recombinant), 1 mg, Intramuscular, PRN    glucose, 16 g, Oral, PRN    glucose, 24 g, Oral, PRN    HYDROmorphone, 0.5 mg, Intravenous, Q4H PRN    insulin aspart U-100, 0-5 Units, Subcutaneous, QID (AC + HS) PRN    ipratropium, 0.5 mg, Nebulization, Q4H PRN    naloxone, 0.02 mg, Intravenous, PRN    ondansetron, 4 mg, Intravenous, Q6H PRN    promethazine-codeine 6.25-10 mg/5 ml, 10 mL, Oral, Q6H PRN    sodium chloride 0.9%, 10 mL, Intravenous, Q12H PRN    sodium chloride 0.9%, 10 mL, Intravenous, PRN    Family History       Problem Relation (Age of Onset)    Cancer Mother    Cirrhosis Brother    Diabetes Brother    Heart attack Father    Heart disease Father, Sister, Brother, Sister, Brother     Hypertension Father, Brother          Tobacco Use    Smoking status: Never    Smokeless tobacco: Never   Substance and Sexual Activity    Alcohol use: Not Currently     Comment: up to 1 yr ago drank 2-3 drinks on occasion but sporadic    Drug use: No    Sexual activity: Yes     Partners: Male       Review of Systems   Constitutional:  Positive for activity change, appetite change and fatigue.   Respiratory:  Positive for shortness of breath.    Gastrointestinal:  Positive for nausea. Negative for constipation, diarrhea and vomiting.   Neurological:  Positive for weakness.   Psychiatric/Behavioral:  Positive for sleep disturbance.      Objective:     Vital Signs (Most Recent):  Temp: 97.6 °F (36.4 °C) (07/26/24 1106)  Pulse: 91 (07/26/24 1112)  Resp: 18 (07/26/24 1106)  BP: 117/78 (07/26/24 1106)  SpO2: (!) 94 % (07/26/24 1106) Vital Signs (24h Range):  Temp:  [97.4 °F (36.3 °C)-98 °F (36.7 °C)] 97.6 °F (36.4 °C)  Pulse:  [67-93] 91  Resp:  [17-18] 18  SpO2:  [93 %-99 %] 94 %  BP: (114-130)/(64-84) 117/78     Weight: 81.4 kg (179 lb 7.3 oz)  Body mass index is 28.96 kg/m².       Physical Exam  Vitals and nursing note reviewed. Exam conducted with a chaperone present.   Constitutional:       General: She is awake.      Appearance: She is not toxic-appearing.      Comments: Pt sitting in recliner   Eyes:      Conjunctiva/sclera: Conjunctivae normal.   Pulmonary:      Effort: Pulmonary effort is normal.   Musculoskeletal:         General: No swelling.   Skin:     General: Skin is warm.   Neurological:      Comments: Awake, engaged, able to answer all of our questions appropriately   Psychiatric:         Attention and Perception: Attention normal.         Mood and Affect: Mood is depressed. Affect is blunt.         Speech: Speech normal.            Review of Symptoms      Symptom Assessment (ESAS 0-10 Scale)  Pain:  0  Dyspnea:  0  Anxiety:  0  Nausea:  5  Depression:  0  Anorexia:  5  Fatigue:  8  Insomnia:   0  Restlessness:  0  Agitation:  0       Constipation:  No constipation    Psychosocial/Cultural:   See Palliative Psychosocial Note: Yes  **Primary  to Follow**  Palliative Care  Consult: Yes        Advance Care Planning   Advance Directives:   Living Will: No    LaPOST: No    Do Not Resuscitate Status: No    Medical Power of : Yes      Decision Making:  Patient answered questions  Goals of Care: What is most important right now is to focus on curative/life-prolongation (regardless of treatment burdens), remaining as independent as possible, symptom/pain control. Accordingly, we have decided that the best plan to meet the patient's goals includes continuing with treatment.         CBC:   Recent Labs   Lab 07/26/24 0405   WBC 4.84   HGB 9.8*   HCT 34.4*   MCV 68*   *     BMP:  Recent Labs   Lab 07/26/24 0405   *      K 3.9   CL 99   CO2 23   BUN 99*   CREATININE 3.9*   CALCIUM 8.5*   MG 2.3     LFT:  Lab Results   Component Value Date    AST 20 07/22/2024    ALKPHOS 51 (L) 07/22/2024    BILITOT 2.4 (H) 07/22/2024     Albumin:   Albumin   Date Value Ref Range Status   07/22/2024 3.7 3.5 - 5.2 g/dL Final     Protein:   Total Protein   Date Value Ref Range Status   07/22/2024 6.3 6.0 - 8.4 g/dL Final     Lactic acid:   Lab Results   Component Value Date    LACTATE 2.1 06/27/2024    LACTATE 1.1 05/05/2024

## 2024-07-26 NOTE — ASSESSMENT & PLAN NOTE
Likely due to chronic disease & poor nutrition in s/o approaching EOL.    - replace electrolytes as needed  - nutrition consulted

## 2024-07-26 NOTE — ASSESSMENT & PLAN NOTE
57 y/o f, with end-stage heart failure, on a dobutamine infusion, admitted with worsening weakness, malaise, anorexia, nausea    Advance Care Planning     Medicolegal  Decision-making:   -Pt does have decision-making capacity  -Pt has appointed a HCPOA.  Patient has a document uploaded in epic.  She has chosen her daughter, Erika Estrada, to be her HCPOA and her  to be her 2nd  -Marital status:   -Children: 2 adult children    Advance care planning/Advance directives:  -The patient has previously engaged in advance care planning  -HCPOA in [employer]    Psychosocial  Support system:  -Spiritual: yes;  Patient is part of a particular bernabe background: Religious  The palliative care  will be consulted to assess the patient.  -Family: seem involved and supportive though pt lives in [place] which is an hour away and suspect this is why no one is at bedside.  She is very close with her granddaughter who is 18 years old and she states that she visits her every single day when she is home.  She is also close with her daughter though she says her daughter has 6 children, is currently pregnant, has Graves disease and has a lot on her plate.    Health status prior to this hospitalization  -Functional status: fair.  Pt was able to manage ADLs though patient tells us that recently she had become increasingly fatigued, less able and motivated to go outside.  She states that recently all she is wanted to do was laid down in bed and sleep  -Cognitive status: intact   -QOL: good - patient feels like she was still able to enjoy interactions with her family which bring her a lot of meaning    Prognosis:  -Time and potential for recovery:  Patient obviously with end-stage heart failure, high short-term mortality risk though she has been doing relatively well.  I do worry though given the symptoms that she is describing including anorexia, profound fatigue, nausea, anorexia, that she could in fact now be approaching  the end of her life.  Patient aware of this as well.    [facility] Discussion  7/26/24  -I followed up with the patient to talk to her more about the plan once she is discharged home.  We talked a little bit more about the benefit to her enrolling in hospice care.  Patient stating that she is willing to try hospice again though she does have concerns about this.  Concerns largely seem to be centered around what hospice enrollment means in terms of her prognosis and how scary that is for her.  Patient again expressing frustration that she can not do more for herself in his current condition.  She is frustrated that all she wants to do is lie in bed and rest.  I tried to explain to her that feeling this way is not her fault in his related to her underlying, severe advanced heart failure.  7/25/24  With patient, myself, Dr. Pichardo, Verónica Brock, pt's  and a family friend  -we had a long and productive meeting with the patient and her .  We updated the family on the patient's condition.  I shared my worried that her condition has worsened as evidenced by her worsening symptom burden, fatigue, drowsiness, nausea, anorexia.  Patient agreed that she has less energy and is less interested in moving around and doing things.   asked a lot of questions about whether the patient could still be eligible for a transplant.  They have apparently explored the possibility of getting the patient re-evaluated in [place].  They already tried doing this in [place] but were unsuccessful given the patient's insurance status.  Myself and the  explained why we did not think the patient would be eligible for transplant in [place] and why we did not think it would be a good use of their time.  Patient shared a lot of her disappointment and sadness over being turned down for a transplant last year.  She was very tearful during this part in the conversation, feeling like she did everything that she was asked to do and  could do and feeling let down that she was turned down anyway.  We validated these feelings.      We did ultimately recommend that when patient is discharged home, she goes with hospice care and we reviewed all the reasons why this could be helpful to them.  Patient apparently very briefly enrolled in hospice many months ago and says she ultimately did not want it at that time though she even admits that she was not nearly as sick then as she is now.  We explained why it would be helpful to have that extra layer of support and symptom management given the patient is in a different stage of her illness.  In addition, we shared our worry that every time the patient comes to the hospital, she is very far from her community of friends and family.   asked me to be blunt and tell them exactly what I was thinking was happening with the patient.  I told them that I was worried time was short, and could be on the order of weeks to months.  Has thanked me for being direct with them.    Patient and family needing more time to talk about these options amongst themselves and we made a plan to check in tomorrow morning to learn how they were thinking about next steps.  7/24/24  With patient and Dr. Pichardo:  -I had a long and productive conversation with Ms. Hawley about her current situation.  She was able to explain to us why she ended up in the hospital.  She said that she was increasingly weak, wanting to sleep all the time, without any appetite, constant nausea.  She said that recently all she has wanted to do is stay in bed and sleep but that her  became worried and frustrated with her and convinced her to go to the hospital.  She said that it was actually her preference to not come back to the hospital that he ultimately forced her to go.  She is repeatedly questioning what could have happened recently to have triggered all of the symptoms, wondering out loud if it is a medication that she has taken or a recent  fall that she had.  I shared my worry that her symptoms are likely secondary to her worsening heart failure and she was able to acknowledge that this could be the case.  There is a lot of tension in her because of what her body is wanting to do at this point in time (rest/sleep) and what she feels like she should be doing to support her family.  She has also largely tried to shield her family and friends from the severity of her diagnosis which has left her increasingly isolated from them.  I explained that it would be reasonable to consider enrolling in hospice care upon discharge as this would allow her to stay at home with extra support and patient was definitely receptive to this discussion and possibility.  She seems worried about her family's acceptance of this plan however.    Active symptoms  -Pain: none at this time    -Nausea: consider zofran 4 mg PO around the clock given persistence of nausea    -Constipation: well-controlled on bowel regimen    -Dyspnea mild.  Dr. Michel had recommended a low-dose fentanyl patch at the last visit but in discussion with the patient she never started it as she was worried about sedation.    -Depression:  Patient's mood does seem flat to me.  On review of Dr. Michel's most recent note, patient was supposed to be on Zoloft 100 mg daily but I do not see that she is getting that medication here.  Would restart.    -Anorexia:  Patient had actually eaten a decent amount of her lunch when I was in the room.  Suspect that anorexia secondary to her end-stage heart failure.  I do not recommend pharmacologic management of this condition.    Summary/Recommendation:  -Most important goals at this time: see family meeting notes above  -patient and family seem to be coming to terms with the possibility that patient's condition has worsened and she is in fact now in a different place medically.  In my meeting this morning, she was agreeable to trying hospice enrollment again; I worry, given  patient's overall clinical appearance, that time is short, potentially even on the order of days to weeks and that we have a narrow window in which to get her home to spend meaningful time with her family and friends.  -Code status: FULL - not addressed in meeting - would defer discussions for now  -Most appropriate disposition: Home with hospice

## 2024-07-26 NOTE — ASSESSMENT & PLAN NOTE
- likely progressing to ESRD  - still making urine  - US RP w/o obstruction  - no uremic sxs, lytes imbalances, acidosis, or other indications for urgent HD  - consider Neph consult, pending further Palliative discussions

## 2024-07-26 NOTE — SUBJECTIVE & OBJECTIVE
Interval History: NAEON. Vsx & labs grossly stable. No sxs of acute exac. Continuing Palliative discussions.    Review of Systems  Objective:     Vital Signs (Most Recent):  Temp: 98 °F (36.7 °C) (07/26/24 0740)  Pulse: 86 (07/26/24 0740)  Resp: 18 (07/26/24 0740)  BP: 123/74 (07/26/24 0740)  SpO2: 97 % (07/26/24 0740) Vital Signs (24h Range):  Temp:  [97.3 °F (36.3 °C)-98 °F (36.7 °C)] 98 °F (36.7 °C)  Pulse:  [82-93] 86  Resp:  [17-19] 18  SpO2:  [93 %-99 %] 97 %  BP: (107-130)/(64-84) 123/74     Weight: 82.6 kg (182 lb 1.6 oz)  Body mass index is 29.39 kg/m².    Intake/Output Summary (Last 24 hours) at 7/26/2024 0807  Last data filed at 7/26/2024 0552  Gross per 24 hour   Intake 720 ml   Output 3050 ml   Net -2330 ml         Physical Exam  Constitutional:       General: She is awake. She is not in acute distress.     Appearance: She is ill-appearing.      Comments: Laying comfortably in bed     HENT:      Nose: Nose normal.      Mouth/Throat:      Mouth: Mucous membranes are moist.      Pharynx: Oropharynx is clear.   Eyes:      Extraocular Movements: Extraocular movements intact.      Conjunctiva/sclera: Conjunctivae normal.   Cardiovascular:      Rate and Rhythm: Normal rate.      Pulses: Normal pulses.      Heart sounds: Normal heart sounds.   Pulmonary:      Effort: Pulmonary effort is normal.      Breath sounds: Normal breath sounds. No wheezing, rhonchi or rales.      Comments: On O2 nasal cannula    Abdominal:      General: There is no distension.      Palpations: Abdomen is soft. There is no mass.      Tenderness: Tenderness: mildly in the epigastric region.   Musculoskeletal:         General: No swelling, tenderness or deformity. Normal range of motion.      Right lower leg: No edema.      Left lower leg: No edema.   Skin:     General: Skin is warm and dry.      Capillary Refill: Capillary refill takes less than 2 seconds.   Neurological:      General: No focal deficit present.      Mental Status: She is  alert and oriented to person, place, and time.      Comments: Awake, engaged, able to answer all questions   Psychiatric:         Attention and Perception: Attention normal.         Mood and Affect: Mood is depressed. Affect is blunt.         Speech: Speech normal.         Behavior: Behavior normal.             Significant Labs: All pertinent labs within the past 24 hours have been reviewed.    Significant Imaging: I have reviewed all pertinent imaging results/findings within the past 24 hours.

## 2024-07-26 NOTE — PROGRESS NOTES
[facility] - Cardiology Stepdown  Palliative Medicine  Progress Note    Patient Name: Hafsa Hawley  MRN: [id number]  Admission Date: 7/24/2024  Hospital Length of Stay: 2 days  Code Status: Full Code   Attending Provider: Nils Damico III*  Consulting Provider: Sophia Franklin MD  Primary Care Physician: Cristobal Ann MD  Principal Problem:End stage heart failure    Patient information was obtained from patient and primary team.      Assessment/Plan:     Palliative Care  Palliative care encounter  57 y/o f, with end-stage heart failure, on a dobutamine infusion, admitted with worsening weakness, malaise, anorexia, nausea    Advance Care Planning    Medicolegal  Decision-making:   -Pt does have decision-making capacity  -Pt has appointed a HCPOA.  Patient has a document uploaded in epic.  She has chosen her daughter, Erika Estrada, to be her HCPOA and her  to be her 2nd  -Marital status:   -Children: 2 adult children    Advance care planning/Advance directives:  -The patient has previously engaged in advance care planning  -HCPOA in [employer]ion    Psychosocial  Support system:  -Spiritual: yes;  Patient is part of a particular bernabe background: Roman Catholic  The palliative care  will be consulted to assess the patient.  -Family: seem involved and supportive though pt lives in [place] which is an hour away and suspect this is why no one is at bedside.  She is very close with her granddaughter who is 18 years old and she states that she visits her every single day when she is home.  She is also close with her daughter though she says her daughter has 6 children, is currently pregnant, has Graves disease and has a lot on her plate.    Health status prior to this hospitalization  -Functional status: fair.  Pt was able to manage ADLs though patient tells us that recently she had become increasingly fatigued, less able and motivated to go outside.  She states that recently all she is wanted to  do was laid down in bed and sleep  -Cognitive status: intact   -QOL: good - patient feels like she was still able to enjoy interactions with her family which bring her a lot of meaning    Prognosis:  -Time and potential for recovery:  Patient obviously with end-stage heart failure, high short-term mortality risk though she has been doing relatively well.  I do worry though given the symptoms that she is describing including anorexia, profound fatigue, nausea, anorexia, that she could in fact now be approaching the end of her life.  Patient aware of this as well.    [facility] Discussion  7/26/24  -I followed up with the patient to talk to her more about the plan once she is discharged home.  We talked a little bit more about the benefit to her enrolling in hospice care.  Patient stating that she is willing to try hospice again though she does have concerns about this.  Concerns largely seem to be centered around what hospice enrollment means in terms of her prognosis and how scary that is for her.  Patient again expressing frustration that she can not do more for herself in his current condition.  She is frustrated that all she wants to do is lie in bed and rest.  I tried to explain to her that feeling this way is not her fault in his related to her underlying, severe advanced heart failure.  7/25/24  With patient, myself, Verónica Carrington, pt's  and a family friend  -we had a long and productive meeting with the patient and her .  We updated the family on the patient's condition.  I shared my worried that her condition has worsened as evidenced by her worsening symptom burden, fatigue, drowsiness, nausea, anorexia.  Patient agreed that she has less energy and is less interested in moving around and doing things.   asked a lot of questions about whether the patient could still be eligible for a transplant.  They have apparently explored the possibility of getting the patient re-evaluated in  [place].  They already tried doing this in [place] but were unsuccessful given the patient's insurance status.  Myself and the  explained why we did not think the patient would be eligible for transplant in [place] and why we did not think it would be a good use of their time.  Patient shared a lot of her disappointment and sadness over being turned down for a transplant last year.  She was very tearful during this part in the conversation, feeling like she did everything that she was asked to do and could do and feeling let down that she was turned down anyway.  We validated these feelings.      We did ultimately recommend that when patient is discharged home, she goes with hospice care and we reviewed all the reasons why this could be helpful to them.  Patient apparently very briefly enrolled in hospice many months ago and says she ultimately did not want it at that time though she even admits that she was not nearly as sick then as she is now.  We explained why it would be helpful to have that extra layer of support and symptom management given the patient is in a different stage of her illness.  In addition, we shared our worry that every time the patient comes to the hospital, she is very far from her community of friends and family.   asked me to be blunt and tell them exactly what I was thinking was happening with the patient.  I told them that I was worried time was short, and could be on the order of weeks to months.  Has thanked me for being direct with them.    Patient and family needing more time to talk about these options amongst themselves and we made a plan to check in tomorrow morning to learn how they were thinking about next steps.  7/24/24  With patient and Dr. Pichardo:  -I had a long and productive conversation with Ms. Hawley about her current situation.  She was able to explain to us why she ended up in the hospital.  She said that she was increasingly weak, wanting to sleep  all the time, without any appetite, constant nausea.  She said that recently all she has wanted to do is stay in bed and sleep but that her  became worried and frustrated with her and convinced her to go to the hospital.  She said that it was actually her preference to not come back to the hospital that he ultimately forced her to go.  She is repeatedly questioning what could have happened recently to have triggered all of the symptoms, wondering out loud if it is a medication that she has taken or a recent fall that she had.  I shared my worry that her symptoms are likely secondary to her worsening heart failure and she was able to acknowledge that this could be the case.  There is a lot of tension in her because of what her body is wanting to do at this point in time (rest/sleep) and what she feels like she should be doing to support her family.  She has also largely tried to shield her family and friends from the severity of her diagnosis which has left her increasingly isolated from them.  I explained that it would be reasonable to consider enrolling in hospice care upon discharge as this would allow her to stay at home with extra support and patient was definitely receptive to this discussion and possibility.  She seems worried about her family's acceptance of this plan however.    Active symptoms  -Pain: none at this time    -Nausea: consider zofran 4 mg PO around the clock given persistence of nausea    -Constipation: well-controlled on bowel regimen    -Dyspnea mild.  Dr. Michel had recommended a low-dose fentanyl patch at the last visit but in discussion with the patient she never started it as she was worried about sedation.    -Depression:  Patient's mood does seem flat to me.  On review of Dr. Michel's most recent note, patient was supposed to be on Zoloft 100 mg daily but I do not see that she is getting that medication here.  Would restart.    -Anorexia:  Patient had actually eaten a decent amount  of her lunch when I was in the room.  Suspect that anorexia secondary to her end-stage heart failure.  I do not recommend pharmacologic management of this condition.    Summary/Recommendation:  -Most important goals at this time: see family meeting notes above  -patient and family seem to be coming to terms with the possibility that patient's condition has worsened and she is in fact now in a different place medically.  In my meeting this morning, she was agreeable to trying hospice enrollment again; I worry, given patient's overall clinical appearance, that time is short, potentially even on the order of days to weeks and that we have a narrow window in which to get her home to spend meaningful time with her family and friends.  -Code status: FULL - not addressed in meeting - would defer discussions for now  -Most appropriate disposition: Home with hospice          I will sign off. Please contact us if you have any additional questions.    Subjective:     Chief Complaint: No chief complaint on file.      HPI:   59 y/o f, h/o NICM (EF 10%)  s/p AICD placement on palliative dobutamine infusion, Afib (on amiodarone and eliquis), CKD, HTN, diabetes, not a candidate for advanced options, followed in the [facility] outpatient palliative care clinic, transferred to [facility] from [facility] yesterday for decompensated heart failure.  Patient reports worsening fatigue, nausea, anorexia, shortness of breath for the past several days.  She states that she has not wanted to do anything and has only wanted to stay in bed which has been very frustrating to her .  On arrival here her labs show an elevated BNP and troponin that are chronic for her.  Her creatinine is actually improved from her baseline.  She has been continued on her outpatient medication.    Hospital Course:  No notes on file    Interval:   -renal function has worsened slightly, no significant changes in overall clinical appearance    Past Medical History:    Diagnosis Date    Allergy     Anemia     Arthritis     Atrial fibrillation     OAC    BMI 32.0-32.9,adult 02/22/2023    Chronic respiratory failure with hypoxia, on home oxygen therapy     2L with activity, off at rest.  Per Pulm  no overt evidence of ILD or COPD on PFTs and CT to explain O2 needs.    CKD (chronic kidney disease), stage IV 05/08/2018    Congestive heart failure     s/p AICD placement,    Deep vein thrombosis     Depression     elevated bilirubin d/t Gilbert's syndrome     confirmed by [facility] genetic testing, evaluated by hepatology    Encounter for blood transfusion     GERD (gastroesophageal reflux disease)     Hypertension     Pheochromocytoma, malignant     Right kidney mass     Sleep apnea     Thalassemia trait, alpha     Thyroid disease     Type 2 diabetes mellitus with hyperglycemia, without long-term current use of insulin 08/13/2020       Past Surgical History:   Procedure Laterality Date    ANGIOGRAM, ABDOMINAL AORTA Right 04/15/2024    APPENDECTOMY      BONE MARROW BIOPSY      CARDIAC DEFIBRILLATOR PLACEMENT Left 12/2016    CARDIAC ELECTROPHYSIOLOGY MAPPING AND ABLATION      CARDIAC ELECTROPHYSIOLOGY MAPPING AND ABLATION      COLONOSCOPY N/A 05/06/2022    Procedure: COLONOSCOPY;  Surgeon: Arely Betancourt MD;  Location: [facility] ([address] [place] [address]);  Service: Endoscopy;  Laterality: N/A;  heart transplant candidate/ EF 25% - 2nd floor/ defib - Biotronik - ERW  Eliquis - per Dr. Cortez with [facility], Pt ok to hold Eliquis x 2 days prior-see media tab-outside correspondence dated 12/30/21  - [facility]  verbal instructions/portal instructions/email instructions - s    EYE SURGERY      due to running tears    FRACTURE SURGERY Left     hand 5th digit    HYSTERECTOMY      KNEE SURGERY Left 2016    hematoma    LIVER BIOPSY  10/24/2018    Minimal steatosis, predominantly macrovesicular, 1%, Minimal nonspecific portal inflammation, no fibrosis. No findings on biopsy to explain elevated bilirubin levels. Could  "be d/t Gilbert's =?- hemolysis    RIGHT HEART CATHETERIZATION Right 12/07/2021    Procedure: INSERTION, CATHETER, RIGHT HEART;  Surgeon: Irving Cardenas MD;  Location: [facility] CATH LAB;  Service: Cardiology;  Laterality: Right;    RIGHT HEART CATHETERIZATION Right 12/19/2022    Procedure: INSERTION, CATHETER, RIGHT HEART;  Surgeon: Burke Camilo MD;  Location: [facility] CATH LAB;  Service: Cardiology;  Laterality: Right;    RIGHT HEART CATHETERIZATION Right 03/29/2023    Procedure: INSERTION, CATHETER, RIGHT HEART;  Surgeon: Katie Liriano DO;  Location: [facility] CATH LAB;  Service: Cardiology;  Laterality: Right;    TRANSJUGULAR BIOPSY OF LIVER N/A 10/24/2018    Procedure: BIOPSY, LIVER, TRANSJUGULAR APPROACH;  Surgeon: Carmen Diagnostic Provider;  Location: [facility] OR [address] [place] [address];  Service: Radiology;  Laterality: N/A;       Review of patient's allergies indicates:   Allergen Reactions    Penicillins Hives and Other (See Comments)    Iodinated contrast media Nausea And Vomiting    Oxycodone-acetaminophen Other (See Comments)     Nausea, Dizziness, Anxiety.  "I don't like how it makes me feel."   Given Hydromorphone 0.5mg IVP  Without problems.  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 Metabolism of other drugs not listed here may also be impacted by UGT1A1 enzyme activity.       Tramadol Nausea And Vomiting and Other (See Comments)     Other reaction(s): Other (See " Comments)    Valsartan Other (See Comments)       Medications:  Continuous Infusions:   DOBUTamine IV infusion (non-titrating)  5 mcg/kg/min Intravenous Continuous 6.1 mL/hr at 07/25/24 1707 5 mcg/kg/min at 07/25/24 1707     Scheduled Meds:   allopurinoL  300 mg Oral Daily    amiodarone  200 mg Oral Daily    apixaban  2.5 mg Oral BID    aspirin  81 mg Oral Daily    atorvastatin  40 mg Oral QHS    bumetanide  2 mg Oral QHS    bumetanide  4 mg Oral Daily    empagliflozin  10 mg Oral Daily    hydrALAZINE 10 mg 1 tablet, hydrALAZINE 25 mg 1 tablet, isorsorbide dinitrate 20 mg 1 tablet combination   Oral TID    magnesium oxide  400 mg Oral QHS    metOLazone  5 mg Oral Daily    pantoprazole  40 mg Oral Daily    polyethylene glycol  17 g Oral Daily    pregabalin  50 mg Oral QHS    tiotropium bromide  2 puff Inhalation Daily     PRN Meds:  Current Facility-Administered Medications:     acetaminophen, 650 mg, Oral, Q6H PRN    benzonatate, 100 mg, Oral, TID PRN    dextrose 10%, 12.5 g, Intravenous, PRN    dextrose 10%, 25 g, Intravenous, PRN    dicyclomine, 20 mg, Oral, TID PRN    glucagon (human recombinant), 1 mg, Intramuscular, PRN    glucose, 16 g, Oral, PRN    glucose, 24 g, Oral, PRN    HYDROmorphone, 0.5 mg, Intravenous, Q4H PRN    insulin aspart U-100, 0-5 Units, Subcutaneous, QID (AC + HS) PRN    ipratropium, 0.5 mg, Nebulization, Q4H PRN    naloxone, 0.02 mg, Intravenous, PRN    ondansetron, 4 mg, Intravenous, Q6H PRN    promethazine-codeine 6.25-10 mg/5 ml, 10 mL, Oral, Q6H PRN    sodium chloride 0.9%, 10 mL, Intravenous, Q12H PRN    sodium chloride 0.9%, 10 mL, Intravenous, PRN    Family History       Problem Relation (Age of Onset)    Cancer Mother    Cirrhosis Brother    Diabetes Brother    Heart attack Father    Heart disease Father, Sister, Brother, Sister, Brother    Hypertension Father, Brother          Tobacco Use    Smoking status: Never    Smokeless tobacco: Never   Substance and Sexual Activity     Alcohol use: Not Currently     Comment: up to 1 yr ago drank 2-3 drinks on occasion but sporadic    Drug use: No    Sexual activity: Yes     Partners: Male       Review of Systems   Constitutional:  Positive for activity change, appetite change and fatigue.   Respiratory:  Positive for shortness of breath.    Gastrointestinal:  Positive for nausea. Negative for constipation, diarrhea and vomiting.   Neurological:  Positive for weakness.   Psychiatric/Behavioral:  Positive for sleep disturbance.      Objective:     Vital Signs (Most Recent):  Temp: 97.6 °F (36.4 °C) (07/26/24 1106)  Pulse: 91 (07/26/24 1112)  Resp: 18 (07/26/24 1106)  BP: 117/78 (07/26/24 1106)  SpO2: (!) 94 % (07/26/24 1106) Vital Signs (24h Range):  Temp:  [97.4 °F (36.3 °C)-98 °F (36.7 °C)] 97.6 °F (36.4 °C)  Pulse:  [67-93] 91  Resp:  [17-18] 18  SpO2:  [93 %-99 %] 94 %  BP: (114-130)/(64-84) 117/78     Weight: 81.4 kg (179 lb 7.3 oz)  Body mass index is 28.96 kg/m².       Physical Exam  Vitals and nursing note reviewed. Exam conducted with a chaperone present.   Constitutional:       General: She is awake.      Appearance: She is not toxic-appearing.      Comments: Pt sitting in recliner   Eyes:      Conjunctiva/sclera: Conjunctivae normal.   Pulmonary:      Effort: Pulmonary effort is normal.   Musculoskeletal:         General: No swelling.   Skin:     General: Skin is warm.   Neurological:      Comments: Awake, engaged, able to answer all of our questions appropriately   Psychiatric:         Attention and Perception: Attention normal.         Mood and Affect: Mood is depressed. Affect is blunt.         Speech: Speech normal.            Review of Symptoms      Symptom Assessment (ESAS 0-10 Scale)  Pain:  0  Dyspnea:  0  Anxiety:  0  Nausea:  5  Depression:  0  Anorexia:  5  Fatigue:  8  Insomnia:  0  Restlessness:  0  Agitation:  0       Constipation:  No constipation    Psychosocial/Cultural:   See Palliative Psychosocial Note: Yes  **Primary   to Follow**  Palliative Care  Consult: Yes        Advance Care Planning  Advance Directives:   Living Will: No    [place]: No    Do Not Resuscitate Status: No    Medical Power of : Yes      Decision Making:  Patient answered questions  Goals of Care: What is most important right now is to focus on curative/life-prolongation (regardless of treatment burdens), remaining as independent as possible, symptom/pain control. Accordingly, we have decided that the best plan to meet the patient's goals includes continuing with treatment.         CBC:   Recent Labs   Lab 07/26/24 0405   WBC 4.84   HGB 9.8*   HCT 34.4*   MCV 68*   *     BMP:  Recent Labs   Lab 07/26/24 0405   *      K 3.9   CL 99   CO2 23   BUN 99*   CREATININE 3.9*   CALCIUM 8.5*   MG 2.3     LFT:  Lab Results   Component Value Date    AST 20 07/22/2024    ALKPHOS 51 (L) 07/22/2024    BILITOT 2.4 (H) 07/22/2024     Albumin:   Albumin   Date Value Ref Range Status   07/22/2024 3.7 3.5 - 5.2 g/dL Final     Protein:   Total Protein   Date Value Ref Range Status   07/22/2024 6.3 6.0 - 8.4 g/dL Final     Lactic acid:   Lab Results   Component Value Date    LACTATE 2.1 06/27/2024    LACTATE 1.1 05/05/2024         Sophia Franklin MD  Palliative Medicine  [facility] - Cardiology Stepdown

## 2024-07-26 NOTE — ASSESSMENT & PLAN NOTE
Anemia is likely due to chronic disease due to Chronic Kidney Disease. Most recent hemoglobin and hematocrit are listed below.    - Monitor serial CBC: Daily  - Transfuse PRBC if patient becomes hemodynamically unstable, symptomatic or H/H drops below 7/21.  - Patient Has not received any PRBCs to date  - Patient's anemia is currently  stable

## 2024-07-26 NOTE — DISCHARGE SUMMARY
Arie Vazquez - Cardiology [facility] Medicine  Discharge Summary    Patient Name: Hafsa Hawley  MRN: [id number]  GLENN: [id number]  Patient Class: OP- Observation  Admission Date: 7/24/2024  Hospital Length of Stay: 2 days  Discharge Date and Time:  07/26/2024 12:02 PM  Attending Physician: Nils Damico III*  Discharging Provider: Andrea De La Cruz MD  Primary Care Provider: Cristobal Ann MD  [facility] Medicine Team: [facility]OSP [facility] 4 Andrea De La Cruz MD  Primary Care Team: [facility] 4    HPI:  Ms. Hafsa Hawley is a 57yo F with a PMHx of NICM s/p AICD placement on palliative dobutamine infusion, Afib (on amiodarone and eliquis), CKD, HTN, diabetes, presenting as a transfer from [facility] with a chief complaint of fatigue. Patient also complaining of coughing, chest pressure, difficulty breathing, and dizziness. Patient reports she's been feeling this way for the past week. She does not report any changes to her diet or fluid intake. She does have home oxygen which she uses as needed. She does mention that she has been out of her cough medication (promethazine-codeine) for the past month. Since, her cough has been more persistent and keeping her up at night. Associated symptoms include nausea, chronic and unchanged. She denies any headaches, fever, chills, chest pain, palpitations, vomiting, diarrhea, hematochezia, urinary symptoms, or leg swelling. She has been compliant with her medications including her GDMT medications.     ED Course at [facility]:  Afebrile and HDS. Mildly tachycardic, but otherwise VS wnl. On low flow NC for comfort, but no hypoxia. Pt states her  gtt rate was recently lowered due to weight loss. Workup most significant for hgb 11.0, normal WBC and PLTs, normal Na and K, BUN 77, Cr 2.7 (below her baseline which seems to be mid 3s-4), BG 130s, T bili 2.4, BNP 2,754 (appears to be approx her recent baseline - this month, although 2766-6282 in June), trop 1.885 (lower than recent  baseline). CXR with enlarged cardiac silhouette.  Left lung base is obscured.  Right basilar airspace disease.  Right upper extremity PICC line, unchanged in position.  Left chest wall AICD.     She was given 80 mg IV lasix, breathing treatments, and O2 by NC for comfort in the ED. Not much UOP since receiving 80 mg IV lasix. Transfer is requested for cardiology consult. She is not yet ready to consider hospice, but is following with palliative medicine. PFC spoke to [facility] and since pt is on palliative  with no advanced options, they requested admission to hospital medicine.     * No surgery found *    Hospital Course:  Transferred from OSH for HTS eval of advanced options for ESHF. She mainly c/o chronic malaise, likely 2/2 progression of ESHF & CKD 5, manifesting as cough & difficulty sleeping/resting. Upon review of prior HTS & Palliative notes, she has been extensively worked up for advanced options & has been denied Htrx in the past 2/2 c/f noncompliance. HTS & Palliative Care consulted for assistance w/ progression of end-stage disease.    Renal function noted to be worsening, likely 2/2 progression of CKD; no sxs of uremia or urgent indications for HD. US RP w/o obstruction.    After discussion of life-limiting illness, pt decided to go home w/ hospice. D/c on promethazine-codeine for cough. Continue all other home meds as prescribed. Medically stable for d/c home w/ hospice.    Vital Signs (Most Recent):  Temp: 97.6 °F (36.4 °C) (07/26/24 1106)  Pulse: 91 (07/26/24 1112)  BP: 117/78 (07/26/24 1106)  Resp: 18 (07/26/24 1106)  SpO2: (!) 94 % (07/26/24 1106) Vital Signs (24h Range):  Temp:  [97.4 °F (36.3 °C)-98 °F (36.7 °C)] 97.6 °F (36.4 °C)  Pulse:  [67-93] 91  Resp:  [17-18] 18  SpO2:  [93 %-99 %] 94 %  BP: (114-130)/(64-84) 117/78     Goals of Care Treatment Preferences:  Code Status: Full Code     What is most important right now is to focus on curative/life-prolongation (regardless of treatment  burdens), remaining as independent as possible, symptom/pain control.  Accordingly, we have decided that the best plan to meet the patient's goals includes continuing with treatment.    Consults:   Consults (From admission, onward)          Status Ordering Provider     Inpatient consult to Heart Transplant  Once        Provider:  (Not yet assigned)    Acknowledged LOKI LANDRY     Inpatient consult to Palliative Care  Once        Provider:  (Not yet assigned)    Completed LOKI LANDRY     Inpatient consult to Social Work/Case Management  Once        Provider:  (Not yet assigned)    Acknowledged HAYLEI MCCLAIN     Inpatient consult to Registered Dietitian/Nutritionist  Once        Provider:  (Not yet assigned)    Completed HAYLIE MCCLAIN           Final Active Diagnoses:    Diagnosis Date Noted POA    PRINCIPAL PROBLEM:  End stage heart failure [I50.84] 07/24/2024 Yes     Chronic    Type 2 diabetes mellitus with stage 4 chronic kidney disease, without long-term current use of insulin [E11.22, N18.4] 08/13/2020 Yes     Chronic    Depression due to physical illness [F06.31] 05/08/2018 Yes     Chronic    Chronic combined systolic and diastolic heart failure [I50.42] 03/16/2018 Yes     Chronic    Essential hypertension [I10] 07/22/2016 Yes     Chronic    Nausea [R11.0] 07/24/2024 Yes    Fatigue [R53.83] 07/24/2024 Yes    Hyperlipidemia, mixed [E78.2] 02/01/2024 Yes     Chronic    Anemia of chronic disease [D63.8] 10/05/2023 Yes     Chronic    CKD (chronic kidney disease) stage 4, GFR 15-29 ml/min [N18.4] 08/11/2023 Yes     Chronic    Paroxysmal A-fib [I48.0] 06/28/2023 Yes     Chronic    Gout, arthritis [M10.9] 06/06/2023 Yes     Chronic    Palliative care encounter [Z51.5] 05/15/2023 Not Applicable    Pulmonary HTN [I27.20] 01/06/2023 Yes     Chronic    Lower extremity neuropathy [G57.90] 08/14/2019 Yes     Chronic    ICD (implantable cardioverter-defibrillator) in place [Z95.810] 07/24/2018 Yes     Chronic    Cough  [R05.9] 02/04/2017 Yes    NICM (nonischemic cardiomyopathy) [I42.8] 10/27/2016 Yes     Chronic    Elevated troponin [R79.89] 07/20/2016 Yes      Problems Resolved During this Admission:     Discharged Condition: stable    Disposition:     Follow Up:    Follow-up Information       Cristobal Ann MD. Schedule an appointment as soon as possible for a visit.    Specialty: Family Medicine  Why: As needed  Contact information:  [address] [place] [address]  [phone]                          Patient Instructions: No discharge procedures on file.    Significant Diagnostic Studies: All labs & imaging studies within the past 24 hours have been reviewed    Pending Diagnostic Studies:       None         Medications:  Reconciled Home Medications:      Medication List        START taking these medications      promethazine-codeine 6.25-10 mg/5 ml 6.25-10 mg/5 mL syrup  Commonly known as: PHENERGAN with CODEINE  Take 10 mLs by mouth every 6 (six) hours as needed for Cough.            CHANGE how you take these medications      ELIQUIS 2.5 mg Tab  Generic drug: apixaban  Take 1 tablet orally 2 times a day.  What changed: Another medication with the same name was removed. Continue taking this medication, and follow the directions you see here.            CONTINUE taking these medications      albuterol-ipratropium 2.5 mg-0.5 mg/3 mL nebulizer solution  Commonly known as: DUO-NEB  Take 3 mLs by nebulization every 6 (six) hours as needed for Wheezing or Shortness of Breath.     allopurinoL 300 MG tablet  Commonly known as: ZYLOPRIM  Take 1 tablet (300 mg total) by mouth once daily.     ALPRAZolam 2 MG Tab  Commonly known as: XANAX  Take 1 tablet orally 2 times a day.     amiodarone 200 MG Tab  Commonly known as: PACERONE  Take 1 tablet orally once a day.     aspirin 81 MG EC tablet  Commonly known as: ECOTRIN  Take 81 mg by mouth once daily.     * atorvastatin 40 MG tablet  Commonly known as: LIPITOR  Take 1 tablet  (40 mg total) by mouth every evening.     * atorvastatin 40 MG tablet  Commonly known as: LIPITOR  Take 1 tablet orally once in the evening.     bumetanide 2 MG tablet  Commonly known as: BUMEX  Take 1 tablet orally once a day.     cetirizine 10 MG tablet  Commonly known as: ZyrTEC  Take 1 tablet (10 mg total) by mouth once daily.     DEXCOM G7 SENSOR Evon  Generic drug: blood-glucose sensor  change sensor every 10 days.     dicyclomine 20 mg tablet  Commonly known as: BENTYL  Take 1 tablet (20 mg total) by mouth 3 (three) times daily as needed (abd pain).     DOBUTamine 1,000 mg/250 mL (4,000 mcg/mL) infusion  Commonly known as: DOBUTREX  Inject 409 mcg/min into the vein continuous.     fentaNYL 12 mcg/hr Pt72  Commonly known as: DURAGESIC  Place 1 patch onto the skin every 72 hours.     ferrous sulfate 325 (65 FE) MG EC tablet  Take 325 mg by mouth once daily.     fluticasone propionate 50 mcg/actuation nasal spray  Commonly known as: FLONASE ALLERGY RELIEF  Use 2 sprays (100 mcg total) by Each Nostril route once daily.     glimepiride 1 MG tablet  Commonly known as: AMARYL  Take 1 tablet (1 mg total) by mouth before breakfast.     HYDROcodone-acetaminophen  mg per tablet  Commonly known as: NORCO  Take 1 tablet by mouth every 6 (six) hours as needed for Pain.     isosorbide-hydrALAZINE 20-37.5 mg 20-37.5 mg Tab  Commonly known as: BIDIL  Take 1 tablet by mouth 3 (three) times daily.     JARDIANCE 25 mg tablet  Generic drug: empagliflozin  Take 1 tablet orally once a day.     LIDOcaine 5 %  Commonly known as: LIDODERM  Place 1 patch onto the skin once daily. Remove & discard patch within 12 hours or as directed by MD.     magnesium oxide 400 mg (241.3 mg magnesium) tablet  Commonly known as: MAG-OX  Take 1 tablet orally once a day.     metOLazone 5 MG tablet  Commonly known as: ZAROXOLYN  Take 1 tablet orally once a day.     metoprolol succinate 25 MG 24 hr tablet  Commonly known as: TOPROL-XL  Take one-half  tablet orally once a day.     nitroGLYCERIN 0.4 MG SL tablet  Commonly known as: NITROSTAT  Put 1 tab under the tongue every 5 minutes x 3 doses as needed for chest pain. Do not exceed 3 doses in 15 minutes. If no relief, go to ER.     ondansetron 8 MG Tbdl  Commonly known as: ZOFRAN-ODT  Dissolve 1 tablet (8 mg total) by mouth every 8 (eight) hours as needed (nausea).     pantoprazole 40 MG tablet  Commonly known as: PROTONIX  Take 1 tablet orally once a day.     polyethylene glycol 17 gram/dose powder  Commonly known as: MIRALAX  Take 17 g by mouth once daily.     pregabalin 50 MG capsule  Commonly known as: LYRICA  Take 1 capsule (50 mg total) by mouth every evening.     sertraline 100 MG tablet  Commonly known as: ZOLOFT  Take 1 tablet (100 mg total) by mouth once daily.     SPIRIVA WITH HANDIHALER 18 mcg inhalation capsule  Generic drug: tiotropium  Inhale 1 capsule (18 mcg total) into the lungs once daily.     TYLENOL ARTHRITIS PAIN 650 mg Tbsr  Generic drug: acetaminophen  Take 1 tablet (650 mg total) by mouth every 8 (eight) hours.     VENTOLIN HFA 90 mcg/actuation inhaler  Generic drug: albuterol  Inhale 2 puffs into the lungs every 6 (six) hours as needed for Shortness of Breath or Wheezing.     VITAMIN B-12 1000 MCG tablet  Generic drug: cyanocobalamin  Take 1,000 mcg by mouth once daily.     VITAMIN D2 1,250 mcg (50,000 unit) capsule  Generic drug: ergocalciferol  Take 1 capsule orally once a  week           * This list has 2 medication(s) that are the same as other medications prescribed for you. Read the directions carefully, and ask your doctor or other care provider to review them with you.               Time spent on the discharge of patient: 35 minutes     Andrea De La Cruz MD  Department of Hospital Medicine   [facility] - Cardiology Stepdown

## 2024-07-26 NOTE — PLAN OF CARE
[facility]  Department of Hospital Medicine  [address] [place] [address]  [phone] after hours  [phone] fax    HOSPICE  ORDERS    07/26/2024    Admit to Hospice:  Home Service     Diagnoses:   Active Hospital Problems    Diagnosis  POA    *End stage heart failure [I50.84]  Yes     Chronic    Type 2 diabetes mellitus with stage 4 chronic kidney disease, without long-term current use of insulin [E11.22, N18.4]  Yes     Priority: 24      Chronic    Depression due to physical illness [F06.31]  Yes     Priority: 24      Chronic    Chronic combined systolic and diastolic heart failure [I50.42]  Yes     Priority: 24      Chronic    Essential hypertension [I10]  Yes     Priority: 24      Chronic    Nausea [R11.0]  Yes    Fatigue [R53.83]  Yes    Hyperlipidemia, mixed [E78.2]  Yes     Chronic    Anemia of chronic disease [D63.8]  Yes     Chronic    CKD (chronic kidney disease) stage 4, GFR 15-29 ml/min [N18.4]  Yes     Chronic    Paroxysmal A-fib [I48.0]  Yes     Chronic    Gout, arthritis [M10.9]  Yes     Chronic    Palliative care encounter [Z51.5]  Not Applicable    Pulmonary HTN [I27.20]  Yes     Chronic    Lower extremity neuropathy [G57.90]  Yes     Chronic    ICD (implantable cardioverter-defibrillator) in place [Z95.810]  Yes     Chronic    Cough [R05.9]  Yes    NICM (nonischemic cardiomyopathy) [I42.8]  Yes     Chronic    Elevated troponin [R79.89]  Yes      Resolved Hospital Problems   No resolved problems to display.       Hospice Qualifying Diagnoses: end-stage heart failure & end-stage kidney disease       Patient has a life expectancy < 6 months due to:  Primary Hospice Diagnosis: end-stage heart failure  Comorbid Conditions Contributing to Decline: nonischemic cardiomyopathy, end-stage kidney disease, diabetes, atrial fibrillation    Vital Signs: Routine per Hospice Protocol.    Code Status: FULL    Allergies:   Review of patient's allergies indicates:  "  Allergen Reactions    Penicillins Hives and Other (See Comments)    Iodinated contrast media Nausea And Vomiting    Oxycodone-acetaminophen Other (See Comments)     Nausea, Dizziness, Anxiety.  "I don't like how it makes me feel."   Given Hydromorphone 0.5mg IVP  Without problems.  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 Metabolism of other drugs not listed here may also be impacted by UGT1A1 enzyme activity.       Tramadol Nausea And Vomiting and Other (See Comments)     Other reaction(s): Other (See Comments)    Valsartan Other (See Comments)       Diet: cardiac & renal diet    Activities: As tolerated    Goals of Care Treatment Preferences:  Code Status: Full Code    Health care [email or web address]: No value filed.          What is most important right now is to focus on curative/life-prolongation (regardless of treatment burdens), remaining as independent as possible, symptom/pain control.  Accordingly, we have decided that the best plan to meet the patient's goals includes continuing with treatment.      Nursing: Per Hospice Routine.      Routine Skin for Bedridden Patients: Apply moisture barrier cream to all skin folds and   wet areas in perineal area daily and after baths and all bowel movements.    PICC Care:   Scrub the Hub: Prior to accessing the line, always perform a 30 second alcohol scrub  Each lumen of the central line " is to be flushed at least daily with 10 mL Normal Saline and 3 mL Heparin flush (100 units/mL)  Skilled Nurse (SN) may draw blood from IV access  Date of removal: n/a    Oxygen: n/a    Other Miscellaneous Care: n/a    Medications:        Medication List        START taking these medications      promethazine-codeine 6.25-10 mg/5 ml 6.25-10 mg/5 mL syrup  Commonly known as: PHENERGAN with CODEINE  Take 10 mLs by mouth every 6 (six) hours as needed for Cough.            CHANGE how you take these medications      ELIQUIS 2.5 mg Tab  Generic drug: apixaban  Take 1 tablet orally 2 times a day.  What changed: Another medication with the same name was removed. Continue taking this medication, and follow the directions you see here.            CONTINUE taking these medications      albuterol-ipratropium 2.5 mg-0.5 mg/3 mL nebulizer solution  Commonly known as: DUO-NEB  Take 3 mLs by nebulization every 6 (six) hours as needed for Wheezing or Shortness of Breath.     allopurinoL 300 MG tablet  Commonly known as: ZYLOPRIM  Take 1 tablet (300 mg total) by mouth once daily.     ALPRAZolam 2 MG Tab  Commonly known as: XANAX  Take 1 tablet orally 2 times a day.     amiodarone 200 MG Tab  Commonly known as: PACERONE  Take 1 tablet orally once a day.     aspirin 81 MG EC tablet  Commonly known as: ECOTRIN  Take 81 mg by mouth once daily.     * atorvastatin 40 MG tablet  Commonly known as: LIPITOR  Take 1 tablet (40 mg total) by mouth every evening.     * atorvastatin 40 MG tablet  Commonly known as: LIPITOR  Take 1 tablet orally once in the evening.     bumetanide 2 MG tablet  Commonly known as: BUMEX  Take 1 tablet orally once a day.     cetirizine 10 MG tablet  Commonly known as: ZyrTEC  Take 1 tablet (10 mg total) by mouth once daily.     DEXCOM G7 SENSOR Evon  Generic drug: blood-glucose sensor  change sensor every 10 days.     dicyclomine 20 mg tablet  Commonly known as: BENTYL  Take 1 tablet (20 mg total) by mouth 3 (three)  times daily as needed (abd pain).     DOBUTamine 1,000 mg/250 mL (4,000 mcg/mL) infusion  Commonly known as: DOBUTREX  Inject 409 mcg/min into the vein continuous.     fentaNYL 12 mcg/hr Pt72  Commonly known as: DURAGESIC  Place 1 patch onto the skin every 72 hours.     ferrous sulfate 325 (65 FE) MG EC tablet  Take 325 mg by mouth once daily.     fluticasone propionate 50 mcg/actuation nasal spray  Commonly known as: FLONASE ALLERGY RELIEF  Use 2 sprays (100 mcg total) by Each Nostril route once daily.     glimepiride 1 MG tablet  Commonly known as: AMARYL  Take 1 tablet (1 mg total) by mouth before breakfast.     HYDROcodone-acetaminophen  mg per tablet  Commonly known as: NORCO  Take 1 tablet by mouth every 6 (six) hours as needed for Pain.     isosorbide-hydrALAZINE 20-37.5 mg 20-37.5 mg Tab  Commonly known as: BIDIL  Take 1 tablet by mouth 3 (three) times daily.     JARDIANCE 25 mg tablet  Generic drug: empagliflozin  Take 1 tablet orally once a day.     LIDOcaine 5 %  Commonly known as: LIDODERM  Place 1 patch onto the skin once daily. Remove & discard patch within 12 hours or as directed by MD.     magnesium oxide 400 mg (241.3 mg magnesium) tablet  Commonly known as: MAG-OX  Take 1 tablet orally once a day.     metOLazone 5 MG tablet  Commonly known as: ZAROXOLYN  Take 1 tablet orally once a day.     metoprolol succinate 25 MG 24 hr tablet  Commonly known as: TOPROL-XL  Take one-half tablet orally once a day.     nitroGLYCERIN 0.4 MG SL tablet  Commonly known as: NITROSTAT  Put 1 tab under the tongue every 5 minutes x 3 doses as needed for chest pain. Do not exceed 3 doses in 15 minutes. If no relief, go to ER.     ondansetron 8 MG Tbdl  Commonly known as: ZOFRAN-ODT  Dissolve 1 tablet (8 mg total) by mouth every 8 (eight) hours as needed (nausea).     pantoprazole 40 MG tablet  Commonly known as: PROTONIX  Take 1 tablet orally once a day.     polyethylene glycol 17 gram/dose powder  Commonly known as:  MIRALAX  Take 17 g by mouth once daily.     pregabalin 50 MG capsule  Commonly known as: LYRICA  Take 1 capsule (50 mg total) by mouth every evening.     sertraline 100 MG tablet  Commonly known as: ZOLOFT  Take 1 tablet (100 mg total) by mouth once daily.     SPIRIVA WITH HANDIHALER 18 mcg inhalation capsule  Generic drug: tiotropium  Inhale 1 capsule (18 mcg total) into the lungs once daily.     TYLENOL ARTHRITIS PAIN 650 mg Tbsr  Generic drug: acetaminophen  Take 1 tablet (650 mg total) by mouth every 8 (eight) hours.     VENTOLIN HFA 90 mcg/actuation inhaler  Generic drug: albuterol  Inhale 2 puffs into the lungs every 6 (six) hours as needed for Shortness of Breath or Wheezing.     VITAMIN B-12 1000 MCG tablet  Generic drug: cyanocobalamin  Take 1,000 mcg by mouth once daily.     VITAMIN D2 1,250 mcg (50,000 unit) capsule  Generic drug: ergocalciferol  Take 1 capsule orally once a  week           * This list has 2 medication(s) that are the same as other medications prescribed for you. Read the directions carefully, and ask your doctor or other care provider to review them with you.                    DIABETES CARE:   Nurse to perform and educate diabetic management with blood glucose monitoring:          Fingerstick blood sugar AC and HS    Report CBG < 60 or > 350 to physician.         Insulin Sliding Scale         Glucose  Novolog Insulin Subcutaneous        0 - 60   Orange juice or glucose tablet      No insulin   201-250  2 units   251-300  4 units   301-350  6 units   351-400  8 units   >400   10 units then call physician      Future Orders:  Hospice Medical Director may dictate new orders for comfortable care measures & sign death certificate.        _________________________________  Andrea De La Cruz MD  07/26/2024

## 2024-07-26 NOTE — ASSESSMENT & PLAN NOTE
Ms. Hafsa Hawley is a 57yo F with NICM s/p AICD placement on palliative dobutamine infusion, Afib (on amiodarone and eliquis), CKD, HTN, diabetes, presenting with a chief complaint of fatigue. She has a history of acute decompensated heart failure exacerbations and managed with GDMT. Most recent echo results shown below with an EF of 15%. The clinical picture during this admit does not fit a HF exacerbation. Her CXR shows cardiomegaly but does not highlight any major changes or increase in fluid volume. On exam, patient is in NAD, without any clinical signs of fluid overload. Lung examination was clear, no observable JVD, or lower extremity swelling. Her fatigue seems to be likely due to lack of rest secondary to her coughing episodes.      - HTS consulted advanced options; transferred from OSH for HTS eval  - Continue home metoprolol, jardiance, hydralazine with isosorbide dinitrate.   - Continue home bumetanide 4mg AM and 2mg PM.   - Continue home metolazone  - Weigh daily, can adjust diuretics as needed  - Strict I/Os  - Low sodium diet

## 2024-07-26 NOTE — ASSESSMENT & PLAN NOTE
History of non-ischemic cardiomyopathy s/p AICD placement on palliative dobutamine and not a candidate for advanced therapies. Patient currently pursuing heart transplant options.    - Continue dobutamine drip 5mcg/kg/min   - Patient follows palliative medicine outpatient; Palliative consulted for assistance w/ GOC  - [facility] consulted

## 2024-07-26 NOTE — PLAN OF CARE
"[facility] - Cardiology Stepdown  Discharge Final Note    Primary Care Provider: Cristobal Ann MD    Expected Discharge Date: 7/26/2024    Final Discharge Note (most recent)       Final Note - 07/26/24 1513          Final Note    Assessment Type Final Discharge Note     Anticipated Discharge Disposition Home or Self Care     Hospital Resources/Appts/Education Provided Appointments scheduled and added to AVS        Post-Acute Status    Post-Acute Authorization IV Infusion     IV Infusion Status Set-up Complete/Auth obtained                 SW informed by Palliative Care LCSW Verónica that pt wants to go home with hospice.  However, when SW met with pt at bedside she stated that she was not sure she wanted hospice, explaining had met with a hospice provider earlier and did not think that hospice was what she wanted at this time.  Pt stated that she felt pressured to do hospice and was "confused" by the whole situation.  SW discussed with pt that she has the options of discharging home and deciding to do hospice later, or go home with hospice and decide to cancel later.  SW left pt to think about it some point and updated provider team and LCSW Verónica.  Per Dr Damico after meeting with pt, plan for pt to discharge home today without hospice and discussed that pt can set up home hospice as an outpatient if she changes her mind.      SW informed Peri with [facility] that pt is discharging today.  Peri stated they would come to the bedside to hook pt up to her home dobutamine.  LAURIE Covarrubias notified.      Lourdes Elder LMSW [facility]  [id number]      Future Appointments   Date Time Provider Department Center   7/29/2024 12:30 PM CHAIR 01 DELFIN STAH CHEMO [facility]   8/1/2024 10:15 AM Wendie Michel PA-C [facility] ORTHO [place] [facility]   8/8/2024  2:00 PM Tatiana Frotune, NP [facility] CARDIO [place] [facility]   8/23/2024 10:50 AM [facility] LAB [facility] LAB [facility]   8/26/2024  1:00 PM Sherine, " Vijay ZHU MD [facility] NEPHRO [place] [facility]   9/23/2024  2:00 PM Sandra Hernandes MD [facility] PLMDBECYRUS [place]   10/10/2024  3:30 PM Cristobal Ann MD [facility]   11/20/2024  9:00 AM [facility]

## 2024-07-26 NOTE — PLAN OF CARE
Future Appointments   Date Time Provider Department Center   7/29/2024 12:30 PM CHAIR 01 STAH STAH CHEMO [facility]   8/1/2024 10:15 AM Wendie Michel PA-C [facility] ORTHO [place] [facility]   8/8/2024  2:00 PM Tatiana Fortune, NP [facility] CARDIO [place] [facility]   8/23/2024 10:50 AM [facility] LAB [facility] LAB [facility]   8/26/2024  1:00 PM Vijay Basurto MD [facility] NEPHRO [place] [facility]   9/23/2024  2:00 PM Sandra Hernandes MD [facility] PLSEB [place]   10/10/2024  3:30 PM Cristobal Ann MD [facility]   11/20/2024  9:00 AM Uziel Herman MD [facility] ENDOCR [place] [facility]           [facility] appointment scheduled 08/08. Patient has Nephro appointment in 1 month        CRESCENCIO Christianson  Case Management  [phone]

## 2024-07-29 ENCOUNTER — INFUSION (OUTPATIENT)
Dept: INFUSION THERAPY | Facility: HOSPITAL | Age: 59
End: 2024-07-29
Attending: INTERNAL MEDICINE
Payer: MEDICAID

## 2024-07-29 VITALS — SYSTOLIC BLOOD PRESSURE: 125 MMHG | RESPIRATION RATE: 20 BRPM | DIASTOLIC BLOOD PRESSURE: 81 MMHG | HEART RATE: 97 BPM

## 2024-07-29 DIAGNOSIS — I42.9 CARDIOMYOPATHY, UNSPECIFIED TYPE: Primary | ICD-10-CM

## 2024-07-29 DIAGNOSIS — I50.42 CHRONIC COMBINED SYSTOLIC AND DIASTOLIC HEART FAILURE: ICD-10-CM

## 2024-07-29 DIAGNOSIS — N18.4 CHRONIC KIDNEY DISEASE, STAGE IV (SEVERE): ICD-10-CM

## 2024-07-29 DIAGNOSIS — E11.9 DIABETES MELLITUS WITHOUT COMPLICATION: ICD-10-CM

## 2024-07-29 DIAGNOSIS — R52 PAIN: ICD-10-CM

## 2024-07-29 DIAGNOSIS — I11.0 HYPERTENSIVE HEART DISEASE WITH CONGESTIVE HEART FAILURE, UNSPECIFIED HEART FAILURE TYPE: ICD-10-CM

## 2024-07-29 LAB
ANION GAP SERPL CALC-SCNC: 15 MMOL/L (ref 8–16)
BACTERIA BLD CULT: NORMAL
BACTERIA BLD CULT: NORMAL
BNP SERPL-MCNC: 2662 PG/ML (ref 0–99)
BUN SERPL-MCNC: 84 MG/DL (ref 6–20)
CALCIUM SERPL-MCNC: 9.6 MG/DL (ref 8.7–10.5)
CHLORIDE SERPL-SCNC: 100 MMOL/L (ref 95–110)
CO2 SERPL-SCNC: 28 MMOL/L (ref 23–29)
CREAT SERPL-MCNC: 3 MG/DL (ref 0.5–1.4)
EST. GFR  (NO RACE VARIABLE): 17 ML/MIN/1.73 M^2
GLUCOSE SERPL-MCNC: 209 MG/DL (ref 70–110)
POTASSIUM SERPL-SCNC: 3.8 MMOL/L (ref 3.5–5.1)
SODIUM SERPL-SCNC: 143 MMOL/L (ref 136–145)

## 2024-07-29 PROCEDURE — 80048 BASIC METABOLIC PNL TOTAL CA: CPT | Performed by: INTERNAL MEDICINE

## 2024-07-29 PROCEDURE — 36415 COLL VENOUS BLD VENIPUNCTURE: CPT | Performed by: INTERNAL MEDICINE

## 2024-07-29 PROCEDURE — 83880 ASSAY OF NATRIURETIC PEPTIDE: CPT | Performed by: INTERNAL MEDICINE

## 2024-07-29 NOTE — PROGRESS NOTES
Picc dressing change complete-paulina well.  Site clear without redness or drainage.  D/C with family in stable condition

## 2024-07-30 ENCOUNTER — TELEPHONE (OUTPATIENT)
Dept: ORTHOPEDICS | Facility: CLINIC | Age: 59
End: 2024-07-30
Payer: MEDICAID

## 2024-07-30 RX ORDER — HYDROCODONE BITARTRATE AND ACETAMINOPHEN 10; 325 MG/1; MG/1
1 TABLET ORAL EVERY 6 HOURS PRN
Qty: 120 TABLET | Refills: 0 | Status: SHIPPED | OUTPATIENT
Start: 2024-07-30

## 2024-08-01 ENCOUNTER — TELEPHONE (OUTPATIENT)
Dept: TRANSPLANT | Facility: CLINIC | Age: 59
End: 2024-08-01
Payer: MEDICAID

## 2024-08-01 DIAGNOSIS — I50.9 CONGESTIVE HEART FAILURE, UNSPECIFIED HF CHRONICITY, UNSPECIFIED HEART FAILURE TYPE: Primary | ICD-10-CM

## 2024-08-05 ENCOUNTER — INFUSION (OUTPATIENT)
Dept: INFUSION THERAPY | Facility: HOSPITAL | Age: 59
End: 2024-08-05
Attending: STUDENT IN AN ORGANIZED HEALTH CARE EDUCATION/TRAINING PROGRAM
Payer: MEDICAID

## 2024-08-05 VITALS
RESPIRATION RATE: 24 BRPM | SYSTOLIC BLOOD PRESSURE: 105 MMHG | DIASTOLIC BLOOD PRESSURE: 86 MMHG | HEART RATE: 98 BPM | TEMPERATURE: 97 F

## 2024-08-06 ENCOUNTER — HOSPITAL ENCOUNTER (INPATIENT)
Facility: HOSPITAL | Age: 59
LOS: 5 days | Discharge: HOME HEALTH | DRG: 291 | End: 2024-08-11
Attending: STUDENT IN AN ORGANIZED HEALTH CARE EDUCATION/TRAINING PROGRAM | Admitting: HOSPITALIST
Payer: MEDICAID

## 2024-08-06 DIAGNOSIS — I50.42 CHRONIC COMBINED SYSTOLIC AND DIASTOLIC HEART FAILURE: Primary | Chronic | ICD-10-CM

## 2024-08-06 DIAGNOSIS — R07.9 CHEST PAIN: ICD-10-CM

## 2024-08-06 DIAGNOSIS — R06.02 SHORTNESS OF BREATH: ICD-10-CM

## 2024-08-06 LAB
ALBUMIN SERPL BCP-MCNC: 3.5 G/DL (ref 3.5–5.2)
ALP SERPL-CCNC: 52 U/L (ref 55–135)
ALT SERPL W/O P-5'-P-CCNC: 16 U/L (ref 10–44)
ANION GAP SERPL CALC-SCNC: 12 MMOL/L (ref 8–16)
AST SERPL-CCNC: 26 U/L (ref 10–40)
BILIRUB SERPL-MCNC: 2.6 MG/DL (ref 0.1–1)
BNP SERPL-MCNC: 2951 PG/ML (ref 0–99)
BUN SERPL-MCNC: 86 MG/DL (ref 6–20)
CALCIUM SERPL-MCNC: 10.1 MG/DL (ref 8.7–10.5)
CHLORIDE SERPL-SCNC: 101 MMOL/L (ref 95–110)
CO2 SERPL-SCNC: 26 MMOL/L (ref 23–29)
CREAT SERPL-MCNC: 3.3 MG/DL (ref 0.5–1.4)
EST. GFR  (NO RACE VARIABLE): 15.6 ML/MIN/1.73 M^2
GLUCOSE SERPL-MCNC: 105 MG/DL (ref 70–110)
HCV AB SERPL QL IA: NORMAL
HIV 1+2 AB+HIV1 P24 AG SERPL QL IA: NORMAL
MAGNESIUM SERPL-MCNC: 2.3 MG/DL (ref 1.6–2.6)
PHOSPHATE SERPL-MCNC: 4.9 MG/DL (ref 2.7–4.5)
POTASSIUM SERPL-SCNC: 4.9 MMOL/L (ref 3.5–5.1)
PROT SERPL-MCNC: 6.3 G/DL (ref 6–8.4)
SODIUM SERPL-SCNC: 139 MMOL/L (ref 136–145)
TROPONIN I SERPL DL<=0.01 NG/ML-MCNC: 1.87 NG/ML (ref 0–0.03)

## 2024-08-06 PROCEDURE — 83735 ASSAY OF MAGNESIUM: CPT | Performed by: STUDENT IN AN ORGANIZED HEALTH CARE EDUCATION/TRAINING PROGRAM

## 2024-08-06 PROCEDURE — 84100 ASSAY OF PHOSPHORUS: CPT | Performed by: STUDENT IN AN ORGANIZED HEALTH CARE EDUCATION/TRAINING PROGRAM

## 2024-08-06 PROCEDURE — 83880 ASSAY OF NATRIURETIC PEPTIDE: CPT | Performed by: STUDENT IN AN ORGANIZED HEALTH CARE EDUCATION/TRAINING PROGRAM

## 2024-08-06 PROCEDURE — 27000221 HC OXYGEN, UP TO 24 HOURS

## 2024-08-06 PROCEDURE — 93010 ELECTROCARDIOGRAM REPORT: CPT | Mod: ,,, | Performed by: INTERNAL MEDICINE

## 2024-08-06 PROCEDURE — 85025 COMPLETE CBC W/AUTO DIFF WBC: CPT | Performed by: STUDENT IN AN ORGANIZED HEALTH CARE EDUCATION/TRAINING PROGRAM

## 2024-08-06 PROCEDURE — 84484 ASSAY OF TROPONIN QUANT: CPT | Performed by: STUDENT IN AN ORGANIZED HEALTH CARE EDUCATION/TRAINING PROGRAM

## 2024-08-06 PROCEDURE — 93005 ELECTROCARDIOGRAM TRACING: CPT

## 2024-08-06 PROCEDURE — 86803 HEPATITIS C AB TEST: CPT | Performed by: PHYSICIAN ASSISTANT

## 2024-08-06 PROCEDURE — 87389 HIV-1 AG W/HIV-1&-2 AB AG IA: CPT | Performed by: PHYSICIAN ASSISTANT

## 2024-08-06 PROCEDURE — 94761 N-INVAS EAR/PLS OXIMETRY MLT: CPT

## 2024-08-06 PROCEDURE — 80053 COMPREHEN METABOLIC PANEL: CPT | Performed by: STUDENT IN AN ORGANIZED HEALTH CARE EDUCATION/TRAINING PROGRAM

## 2024-08-06 PROCEDURE — 99285 EMERGENCY DEPT VISIT HI MDM: CPT | Mod: 25

## 2024-08-06 PROCEDURE — 12000002 HC ACUTE/MED SURGE SEMI-PRIVATE ROOM

## 2024-08-07 PROBLEM — J96.01 ACUTE HYPOXIC RESPIRATORY FAILURE: Status: ACTIVE | Noted: 2021-08-05

## 2024-08-07 LAB
ACANTHOCYTES BLD QL SMEAR: PRESENT
ALBUMIN SERPL BCP-MCNC: 3.4 G/DL (ref 3.5–5.2)
ALP SERPL-CCNC: 51 U/L (ref 55–135)
ALT SERPL W/O P-5'-P-CCNC: 17 U/L (ref 10–44)
ANION GAP SERPL CALC-SCNC: 16 MMOL/L (ref 8–16)
ANISOCYTOSIS BLD QL SMEAR: ABNORMAL
ANISOCYTOSIS BLD QL SMEAR: ABNORMAL
AST SERPL-CCNC: 36 U/L (ref 10–40)
BASO STIPL BLD QL SMEAR: ABNORMAL
BASOPHILS # BLD AUTO: 0.03 K/UL (ref 0–0.2)
BASOPHILS # BLD AUTO: ABNORMAL K/UL (ref 0–0.2)
BASOPHILS NFR BLD: 0 % (ref 0–1.9)
BASOPHILS NFR BLD: 0.6 % (ref 0–1.9)
BILIRUB SERPL-MCNC: 2.7 MG/DL (ref 0.1–1)
BUN SERPL-MCNC: 87 MG/DL (ref 6–20)
BURR CELLS BLD QL SMEAR: ABNORMAL
CALCIUM SERPL-MCNC: 9.5 MG/DL (ref 8.7–10.5)
CHLORIDE SERPL-SCNC: 100 MMOL/L (ref 95–110)
CO2 SERPL-SCNC: 21 MMOL/L (ref 23–29)
CREAT SERPL-MCNC: 3.6 MG/DL (ref 0.5–1.4)
DACRYOCYTES BLD QL SMEAR: ABNORMAL
DACRYOCYTES BLD QL SMEAR: ABNORMAL
DIFFERENTIAL METHOD BLD: ABNORMAL
DIFFERENTIAL METHOD BLD: ABNORMAL
EOSINOPHIL # BLD AUTO: 0.1 K/UL (ref 0–0.5)
EOSINOPHIL # BLD AUTO: ABNORMAL K/UL (ref 0–0.5)
EOSINOPHIL NFR BLD: 0 % (ref 0–8)
EOSINOPHIL NFR BLD: 1.3 % (ref 0–8)
ERYTHROCYTE [DISTWIDTH] IN BLOOD BY AUTOMATED COUNT: 30.7 % (ref 11.5–14.5)
ERYTHROCYTE [DISTWIDTH] IN BLOOD BY AUTOMATED COUNT: 32.3 % (ref 11.5–14.5)
EST. GFR  (NO RACE VARIABLE): 14 ML/MIN/1.73 M^2
GIANT PLATELETS BLD QL SMEAR: PRESENT
GLUCOSE SERPL-MCNC: 92 MG/DL (ref 70–110)
HCT VFR BLD AUTO: 38 % (ref 37–48.5)
HCT VFR BLD AUTO: 39.3 % (ref 37–48.5)
HGB BLD-MCNC: 10.6 G/DL (ref 12–16)
HGB BLD-MCNC: 10.9 G/DL (ref 12–16)
HOWELL-JOLLY BOD BLD QL SMEAR: ABNORMAL
HOWELL-JOLLY BOD BLD QL SMEAR: ABNORMAL
HYPOCHROMIA BLD QL SMEAR: ABNORMAL
HYPOCHROMIA BLD QL SMEAR: ABNORMAL
IMM GRANULOCYTES # BLD AUTO: 0.02 K/UL (ref 0–0.04)
IMM GRANULOCYTES # BLD AUTO: ABNORMAL K/UL (ref 0–0.04)
IMM GRANULOCYTES NFR BLD AUTO: 0.4 % (ref 0–0.5)
IMM GRANULOCYTES NFR BLD AUTO: ABNORMAL % (ref 0–0.5)
INFLUENZA A, MOLECULAR: NOT DETECTED
INFLUENZA B, MOLECULAR: NOT DETECTED
LYMPHOCYTES # BLD AUTO: 1.1 K/UL (ref 1–4.8)
LYMPHOCYTES # BLD AUTO: ABNORMAL K/UL (ref 1–4.8)
LYMPHOCYTES NFR BLD: 22.7 % (ref 18–48)
LYMPHOCYTES NFR BLD: 9 % (ref 18–48)
MAGNESIUM SERPL-MCNC: 2.3 MG/DL (ref 1.6–2.6)
MCH RBC QN AUTO: 18.8 PG (ref 27–31)
MCH RBC QN AUTO: 19 PG (ref 27–31)
MCHC RBC AUTO-ENTMCNC: 27.7 G/DL (ref 32–36)
MCHC RBC AUTO-ENTMCNC: 27.9 G/DL (ref 32–36)
MCV RBC AUTO: 67 FL (ref 82–98)
MCV RBC AUTO: 68 FL (ref 82–98)
METAMYELOCYTES NFR BLD MANUAL: 1 %
MONOCYTES # BLD AUTO: 0.6 K/UL (ref 0.3–1)
MONOCYTES # BLD AUTO: ABNORMAL K/UL (ref 0.3–1)
MONOCYTES NFR BLD: 11.9 % (ref 4–15)
MONOCYTES NFR BLD: 5 % (ref 4–15)
MYELOCYTES NFR BLD MANUAL: 1 %
NEUTROPHILS # BLD AUTO: 2.9 K/UL (ref 1.8–7.7)
NEUTROPHILS # BLD AUTO: ABNORMAL K/UL (ref 1.8–7.7)
NEUTROPHILS NFR BLD: 63.1 % (ref 38–73)
NEUTROPHILS NFR BLD: 84 % (ref 38–73)
NRBC BLD-RTO: 10 /100 WBC
NRBC BLD-RTO: 8 /100 WBC
OHS QRS DURATION: 104 MS
OHS QTC CALCULATION: 428 MS
OVALOCYTES BLD QL SMEAR: ABNORMAL
OVALOCYTES BLD QL SMEAR: ABNORMAL
PHOSPHATE SERPL-MCNC: 5.2 MG/DL (ref 2.7–4.5)
PLATELET # BLD AUTO: 153 K/UL (ref 150–450)
PLATELET # BLD AUTO: 157 K/UL (ref 150–450)
PLATELET BLD QL SMEAR: ABNORMAL
PMV BLD AUTO: ABNORMAL FL (ref 9.2–12.9)
PMV BLD AUTO: ABNORMAL FL (ref 9.2–12.9)
POCT GLUCOSE: 134 MG/DL (ref 70–110)
POIKILOCYTOSIS BLD QL SMEAR: ABNORMAL
POIKILOCYTOSIS BLD QL SMEAR: ABNORMAL
POLYCHROMASIA BLD QL SMEAR: ABNORMAL
POLYCHROMASIA BLD QL SMEAR: ABNORMAL
POTASSIUM SERPL-SCNC: 5.3 MMOL/L (ref 3.5–5.1)
PROT SERPL-MCNC: 6.4 G/DL (ref 6–8.4)
RBC # BLD AUTO: 5.64 M/UL (ref 4–5.4)
RBC # BLD AUTO: 5.75 M/UL (ref 4–5.4)
RSV AG BY MOLECULAR METHOD: NOT DETECTED
SARS-COV-2 RNA RESP QL NAA+PROBE: NOT DETECTED
SCHISTOCYTES BLD QL SMEAR: ABNORMAL
SCHISTOCYTES BLD QL SMEAR: ABNORMAL
SCHISTOCYTES BLD QL SMEAR: PRESENT
SODIUM SERPL-SCNC: 137 MMOL/L (ref 136–145)
SPHEROCYTES BLD QL SMEAR: ABNORMAL
SPHEROCYTES BLD QL SMEAR: ABNORMAL
STOMATOCYTES BLD QL SMEAR: ABNORMAL
TARGETS BLD QL SMEAR: ABNORMAL
TARGETS BLD QL SMEAR: ABNORMAL
TOXIC GRANULES BLD QL SMEAR: PRESENT
TROPONIN I SERPL DL<=0.01 NG/ML-MCNC: 1.78 NG/ML (ref 0–0.03)
WBC # BLD AUTO: 4.62 K/UL (ref 3.9–12.7)
WBC # BLD AUTO: 5.41 K/UL (ref 3.9–12.7)
WBC TOXIC VACUOLES BLD QL SMEAR: PRESENT

## 2024-08-07 PROCEDURE — 63600175 PHARM REV CODE 636 W HCPCS

## 2024-08-07 PROCEDURE — 25000003 PHARM REV CODE 250

## 2024-08-07 PROCEDURE — 83735 ASSAY OF MAGNESIUM: CPT

## 2024-08-07 PROCEDURE — 84100 ASSAY OF PHOSPHORUS: CPT

## 2024-08-07 PROCEDURE — 84484 ASSAY OF TROPONIN QUANT: CPT

## 2024-08-07 PROCEDURE — 25000242 PHARM REV CODE 250 ALT 637 W/ HCPCS

## 2024-08-07 PROCEDURE — 80053 COMPREHEN METABOLIC PANEL: CPT

## 2024-08-07 PROCEDURE — 85025 COMPLETE CBC W/AUTO DIFF WBC: CPT

## 2024-08-07 PROCEDURE — 0241U SARS-COV2 (COVID) WITH FLU/RSV BY PCR: CPT

## 2024-08-07 PROCEDURE — 63600175 PHARM REV CODE 636 W HCPCS: Performed by: STUDENT IN AN ORGANIZED HEALTH CARE EDUCATION/TRAINING PROGRAM

## 2024-08-07 PROCEDURE — 20600001 HC STEP DOWN PRIVATE ROOM

## 2024-08-07 RX ORDER — FUROSEMIDE 10 MG/ML
40 INJECTION INTRAMUSCULAR; INTRAVENOUS
Status: COMPLETED | OUTPATIENT
Start: 2024-08-07 | End: 2024-08-07

## 2024-08-07 RX ORDER — BUMETANIDE 1 MG/1
2 TABLET ORAL NIGHTLY
Status: DISCONTINUED | OUTPATIENT
Start: 2024-08-07 | End: 2024-08-07

## 2024-08-07 RX ORDER — FUROSEMIDE 10 MG/ML
80 INJECTION INTRAMUSCULAR; INTRAVENOUS DAILY
Status: DISCONTINUED | OUTPATIENT
Start: 2024-08-07 | End: 2024-08-07

## 2024-08-07 RX ORDER — NALOXONE HCL 0.4 MG/ML
0.02 VIAL (ML) INJECTION
Status: DISCONTINUED | OUTPATIENT
Start: 2024-08-07 | End: 2024-08-11 | Stop reason: HOSPADM

## 2024-08-07 RX ORDER — BUMETANIDE 1 MG/1
4 TABLET ORAL DAILY
Status: DISCONTINUED | OUTPATIENT
Start: 2024-08-07 | End: 2024-08-07

## 2024-08-07 RX ORDER — ONDANSETRON HYDROCHLORIDE 2 MG/ML
4 INJECTION, SOLUTION INTRAVENOUS EVERY 6 HOURS PRN
Status: DISCONTINUED | OUTPATIENT
Start: 2024-08-07 | End: 2024-08-11 | Stop reason: HOSPADM

## 2024-08-07 RX ORDER — FUROSEMIDE 10 MG/ML
80 INJECTION INTRAMUSCULAR; INTRAVENOUS 2 TIMES DAILY
Status: DISCONTINUED | OUTPATIENT
Start: 2024-08-07 | End: 2024-08-11 | Stop reason: HOSPADM

## 2024-08-07 RX ORDER — DOBUTAMINE HYDROCHLORIDE 400 MG/100ML
5 INJECTION INTRAVENOUS CONTINUOUS
Status: DISCONTINUED | OUTPATIENT
Start: 2024-08-07 | End: 2024-08-11 | Stop reason: HOSPADM

## 2024-08-07 RX ORDER — ATORVASTATIN CALCIUM 40 MG/1
40 TABLET, FILM COATED ORAL NIGHTLY
Status: DISCONTINUED | OUTPATIENT
Start: 2024-08-07 | End: 2024-08-11 | Stop reason: HOSPADM

## 2024-08-07 RX ORDER — ALLOPURINOL 300 MG/1
300 TABLET ORAL DAILY
Status: DISCONTINUED | OUTPATIENT
Start: 2024-08-07 | End: 2024-08-11 | Stop reason: HOSPADM

## 2024-08-07 RX ORDER — ASPIRIN 81 MG/1
81 TABLET ORAL DAILY
Status: DISCONTINUED | OUTPATIENT
Start: 2024-08-07 | End: 2024-08-07

## 2024-08-07 RX ORDER — PANTOPRAZOLE SODIUM 40 MG/1
40 TABLET, DELAYED RELEASE ORAL DAILY
Status: DISCONTINUED | OUTPATIENT
Start: 2024-08-07 | End: 2024-08-11 | Stop reason: HOSPADM

## 2024-08-07 RX ORDER — BENZONATATE 100 MG/1
100 CAPSULE ORAL 3 TIMES DAILY PRN
Status: DISCONTINUED | OUTPATIENT
Start: 2024-08-08 | End: 2024-08-07

## 2024-08-07 RX ORDER — GLUCAGON 1 MG
1 KIT INJECTION
Status: DISCONTINUED | OUTPATIENT
Start: 2024-08-07 | End: 2024-08-11 | Stop reason: HOSPADM

## 2024-08-07 RX ORDER — AMIODARONE HYDROCHLORIDE 200 MG/1
200 TABLET ORAL DAILY
Status: DISCONTINUED | OUTPATIENT
Start: 2024-08-07 | End: 2024-08-11 | Stop reason: HOSPADM

## 2024-08-07 RX ORDER — IBUPROFEN 200 MG
16 TABLET ORAL
Status: DISCONTINUED | OUTPATIENT
Start: 2024-08-07 | End: 2024-08-11 | Stop reason: HOSPADM

## 2024-08-07 RX ORDER — IBUPROFEN 200 MG
24 TABLET ORAL
Status: DISCONTINUED | OUTPATIENT
Start: 2024-08-07 | End: 2024-08-11 | Stop reason: HOSPADM

## 2024-08-07 RX ORDER — SODIUM CHLORIDE 0.9 % (FLUSH) 0.9 %
10 SYRINGE (ML) INJECTION EVERY 12 HOURS PRN
Status: DISCONTINUED | OUTPATIENT
Start: 2024-08-07 | End: 2024-08-11 | Stop reason: HOSPADM

## 2024-08-07 RX ORDER — PREGABALIN 50 MG/1
50 CAPSULE ORAL NIGHTLY
Status: DISCONTINUED | OUTPATIENT
Start: 2024-08-07 | End: 2024-08-11 | Stop reason: HOSPADM

## 2024-08-07 RX ORDER — BENZONATATE 100 MG/1
100 CAPSULE ORAL 3 TIMES DAILY PRN
Status: DISCONTINUED | OUTPATIENT
Start: 2024-08-07 | End: 2024-08-11 | Stop reason: HOSPADM

## 2024-08-07 RX ORDER — METOLAZONE 5 MG/1
5 TABLET ORAL DAILY
Status: DISCONTINUED | OUTPATIENT
Start: 2024-08-07 | End: 2024-08-11 | Stop reason: HOSPADM

## 2024-08-07 RX ORDER — DOBUTAMINE HYDROCHLORIDE 400 MG/100ML
5 INJECTION INTRAVENOUS CONTINUOUS
Status: DISCONTINUED | OUTPATIENT
Start: 2024-08-07 | End: 2024-08-07

## 2024-08-07 RX ADMIN — ATORVASTATIN CALCIUM 40 MG: 40 TABLET, FILM COATED ORAL at 04:08

## 2024-08-07 RX ADMIN — DOBUTAMINE HYDROCHLORIDE 5 MCG/KG/MIN: 400 INJECTION INTRAVENOUS at 03:08

## 2024-08-07 RX ADMIN — AMIODARONE HYDROCHLORIDE 200 MG: 200 TABLET ORAL at 08:08

## 2024-08-07 RX ADMIN — BENZONATATE 100 MG: 100 CAPSULE ORAL at 11:08

## 2024-08-07 RX ADMIN — HYDRALAZINE HYDROCHLORIDE: 10 TABLET, FILM COATED ORAL at 09:08

## 2024-08-07 RX ADMIN — HYDRALAZINE HYDROCHLORIDE: 10 TABLET, FILM COATED ORAL at 08:08

## 2024-08-07 RX ADMIN — APIXABAN 2.5 MG: 2.5 TABLET, FILM COATED ORAL at 09:08

## 2024-08-07 RX ADMIN — SODIUM ZIRCONIUM CYCLOSILICATE 5 G: 5 POWDER, FOR SUSPENSION ORAL at 11:08

## 2024-08-07 RX ADMIN — ATORVASTATIN CALCIUM 40 MG: 40 TABLET, FILM COATED ORAL at 09:08

## 2024-08-07 RX ADMIN — PREGABALIN 50 MG: 50 CAPSULE ORAL at 09:08

## 2024-08-07 RX ADMIN — ALLOPURINOL 300 MG: 100 TABLET ORAL at 08:08

## 2024-08-07 RX ADMIN — METOLAZONE 5 MG: 5 TABLET ORAL at 09:08

## 2024-08-07 RX ADMIN — PREGABALIN 50 MG: 50 CAPSULE ORAL at 04:08

## 2024-08-07 RX ADMIN — HYDRALAZINE HYDROCHLORIDE: 10 TABLET, FILM COATED ORAL at 03:08

## 2024-08-07 RX ADMIN — FUROSEMIDE 40 MG: 10 INJECTION, SOLUTION INTRAVENOUS at 03:08

## 2024-08-07 RX ADMIN — PANTOPRAZOLE SODIUM 40 MG: 40 TABLET, DELAYED RELEASE ORAL at 08:08

## 2024-08-07 RX ADMIN — TIOTROPIUM BROMIDE INHALATION SPRAY 2 PUFF: 3.12 SPRAY, METERED RESPIRATORY (INHALATION) at 08:08

## 2024-08-07 RX ADMIN — FUROSEMIDE 80 MG: 10 INJECTION, SOLUTION INTRAVENOUS at 11:08

## 2024-08-07 RX ADMIN — FUROSEMIDE 80 MG: 10 INJECTION, SOLUTION INTRAVENOUS at 12:08

## 2024-08-07 RX ADMIN — APIXABAN 2.5 MG: 2.5 TABLET, FILM COATED ORAL at 08:08

## 2024-08-07 RX ADMIN — METOPROLOL SUCCINATE 12.5 MG: 25 TABLET, EXTENDED RELEASE ORAL at 11:08

## 2024-08-07 NOTE — ASSESSMENT & PLAN NOTE
elevated bilirubin d/t Gilbert's syndrome    Diffuse pain to palpation. Pt says chronic however tenderness on minimal palpation. Tbili 2.6. LFTs wnl.     Plan:  - f/u CT a/p noncontrast

## 2024-08-07 NOTE — PHARMACY MED REC
"Admission Medication History     The home medication history was taken by Jacob Adams.    You may go to "Admission" then "Reconcile Home Medications" tabs to review and/or act upon these items.     The home medication list has been updated by the Pharmacy department.   Please read ALL comments highlighted in yellow.   Please address this information as you see fit.    Feel free to contact us if you have any questions or require assistance.      Medications listed below were obtained from: Patient/family and Analytic software- [employer]  Current Outpatient Medications on File Prior to Encounter   Medication Sig    acetaminophen (TYLENOL) 650 MG TbSR Take 1 tablet (650 mg total) by mouth daily as needed (pain).    albuterol-ipratropium (DUO-NEB) 2.5 mg-0.5 mg/3 mL nebulizer solution Take 3 mLs by nebulization every 6 (six) hours as needed for Wheezing or Shortness of Breath.    allopurinoL (ZYLOPRIM) 300 MG tablet Take 1 tablet (300 mg total) by mouth once daily.    ALPRAZolam (XANAX) 2 MG Tab Take 2 mg by mouth daily as needed (anxiety).    amiodarone (PACERONE) 200 MG Tab Take one tablet by mouth daily.    apixaban (ELIQUIS) 2.5 mg Tab Take one tablet by mouth twice a day.    aspirin (ECOTRIN) 81 MG EC tablet Take 81 mg by mouth once daily.    atorvastatin (LIPITOR) 40 MG tablet Take one tablet by mouth in the evening.    bumetanide (BUMEX) 2 MG tablet Take 4 mg by mouth 2 (two) times daily.    cetirizine (ZYRTEC) 10 MG tablet Take 1 tablet (10 mg total) by mouth once daily.    DOBUTamine (DOBUTREX) 1,000 mg/250 mL (4,000 mcg/mL) infusion Inject 409 mcg/min into the vein continuous.    empagliflozin (JARDIANCE) 25 mg tablet Take one tablet by mouth daily.    ergocalciferol (VITAMIN D2) 50,000 unit Cap Take 50,000 Units by mouth every 7 days. (Thursdays)    ferrous sulfate 325 (65 FE) MG EC tablet Take 325 mg by mouth once daily.    fluticasone propionate (FLONASE ALLERGY RELIEF) 50 mcg/actuation nasal spray Use 2 " sprays (100 mcg total) by Each Nostril route daily as needed for Rhinitis or Allergies.    glimepiride (AMARYL) 1 MG tablet Take 1 tablet (1 mg total) by mouth before breakfast.    HYDROcodone-acetaminophen (NORCO)  mg per tablet Take 1 tablet by mouth every 6 (six) hours as needed for Pain.    isosorbide-hydrALAZINE 20-37.5 mg (BIDIL) 20-37.5 mg Tab Take 1 tablet by mouth 2 (two) times daily.    LIDOcaine (LIDODERM) 5 % Place 1 patch onto the skin daily as needed (pain).    metOLazone (ZAROXOLYN) 5 MG tablet Take 5 mg by mouth daily as needed (Excess fluid).    metoprolol succinate (TOPROL-XL) 25 MG 24 hr tablet Take 12.5 mg by mouth once daily.    nitroGLYCERIN (NITROSTAT) 0.4 MG SL tablet Put 1 tab under the tongue every 5 minutes x 3 doses as needed for chest pain. Do not exceed 3 doses in 15 minutes. If no relief, go to ER.    ondansetron (ZOFRAN-ODT) 8 MG TbDL Dissolve 1 tablet (8 mg total) by mouth every 8 (eight) hours as needed (nausea).    pantoprazole (PROTONIX) 40 MG tablet Take one tablet by mouth daily    polyethylene glycol (MIRALAX) 17 gram/dose powder Take 17 g by mouth daily as needed for Constipation.    pregabalin (LYRICA) 50 MG capsule Take 1 capsule (50 mg total) by mouth every evening.    promethazine-codeine 6.25-10 mg/5 ml (PHENERGAN WITH CODEINE) 6.25-10 mg/5 mL syrup Take 5 mLs by mouth every 6 (six) hours as needed for Cough.    SPIRIVA WITH HANDIHALER 18 mcg inhalation capsule Inhale 1 capsule (18 mcg total) into the lungs once daily.    VENTOLIN HFA 90 mcg/actuation inhaler Inhale 2 puffs into the lungs daily as needed for Shortness of Breath or Wheezing.    blood-glucose sensor (DEXCOM G7 SENSOR) Evon change sensor every 10 days.    cyanocobalamin (VITAMIN B-12) 1000 MCG tablet Take 1,000 mcg by mouth once daily.    magnesium oxide (MAG-OX) 400 mg (241.3 mg magnesium) tablet Take one tablet by mouth daily       Potential issues to be addressed PRIOR TO DISCHARGE  Patient reported  not taking the following medications: (DURAGESIC). These medications remain on the home medication list. Please address accordingly.           Jacob Adams  EXT [phone]                  .

## 2024-08-07 NOTE — ASSESSMENT & PLAN NOTE
End stage heart failure   Chronic combined systolic and diastolic heart failure    History of non-ischemic cardiomyopathy s/p AICD placement on palliative dobutamine and not a candidate for advanced therapies. Patient currently pursuing heart transplant options.  Recently admitted 07/26 for similar presentation. Last Echo with EF of 10-15%. CXR on this admission with stable cardiomegaly without evidence of new opacifications concerning for edema. WBC wnl. BNP 2951, Trop 1.867 > 1.776. EKG with sinus tach and PAC, w/o ischemic changes on personal review. Needing more supplemental Oxygen; has order for PRN 2L. COVID/Flu/RSV negative. ED gave lasix IV 40mg once.    She reports taking her diuretics (metolazone and bumex) as needed rather than daily as per her last discharge orders. High concern for medication non-adherence and health literacy.     Plan:  - continue home    - consider HTS consult for diuresis management  - changed home bumex 2mg PO to IV lasix 80mg qd; titrate as tolerated for goal diuresis of 2-3L/day  - continue home metolazone  - strict I/O  - daily weights  - Low sodium caridac diet  - replenish lytes as needed K>4, Phos>3, Mg>2

## 2024-08-07 NOTE — ED TRIAGE NOTES
Pt presents to ED with c/o worsening fatigue, weakness, SOB, abd cramping, N/V, and dizziness over the last few months. Reports she has been struggling with her ADL's d/t the SOB and dizziness. Pt recently d/c from hospital on 2L home O2 that she states she wears prn. Pt also on home dobutamine gtt. Denies fever, chills,CP, diarrhea, palpitations, dysuria, and HA

## 2024-08-07 NOTE — ED PROVIDER NOTES
"Encounter Date: 8/6/2024       History     Chief Complaint   Patient presents with    Shortness of Breath    Weakness    Fatigue     Ongoing since last night. History CHF, COPD, stage 4 kidney disease, EF 10%. PICC line on dobutamine.      58-year-old female with history of CHF with reduced ejection fraction, EF proximally 10%, on home dobutamine, presents now for worsening shortness of breath, waking up at night very short of breath, decreased exercise tolerance, decreased appetite, and generalized fatigue.  Symptoms have been ongoing for the past few days and worsened last night into today.      Review of patient's allergies indicates:   Allergen Reactions    Penicillins Hives and Other (See Comments)    Iodinated contrast media Nausea And Vomiting    Oxycodone-acetaminophen Other (See Comments)     Nausea, Dizziness, Anxiety.  "I don't like how it makes me feel."   Given Hydromorphone 0.5mg IVP  Without problems.  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 Metabolism of other drugs not listed here may also be impacted by UGT1A1 enzyme activity.       Tramadol Nausea And Vomiting and Other (See Comments)     Other reaction(s): Other (See Comments)    Valsartan Other (See Comments)     Past Medical History:   Diagnosis Date    Allergy     Anemia     Arthritis     Atrial fibrillation     OAC    BMI 32.0-32.9,adult 02/22/2023    Chronic respiratory failure with hypoxia, " on home oxygen therapy     2L with activity, off at rest.  Per Pulm  no overt evidence of ILD or COPD on PFTs and CT to explain O2 needs.    CKD (chronic kidney disease), stage IV 05/08/2018    Congestive heart failure     s/p AICD placement,    Deep vein thrombosis     Depression     elevated bilirubin d/t Gilbert's syndrome     confirmed by [facility] genetic testing, evaluated by hepatology    Encounter for blood transfusion     GERD (gastroesophageal reflux disease)     Hypertension     Pheochromocytoma, malignant     Right kidney mass     Sleep apnea     Thalassemia trait, alpha     Thyroid disease     Type 2 diabetes mellitus with hyperglycemia, without long-term current use of insulin 08/13/2020     Past Surgical History:   Procedure Laterality Date    ANGIOGRAM, ABDOMINAL AORTA Right 04/15/2024    APPENDECTOMY      BONE MARROW BIOPSY      CARDIAC DEFIBRILLATOR PLACEMENT Left 12/2016    CARDIAC ELECTROPHYSIOLOGY MAPPING AND ABLATION      CARDIAC ELECTROPHYSIOLOGY MAPPING AND ABLATION      COLONOSCOPY N/A 05/06/2022    Procedure: COLONOSCOPY;  Surgeon: Arely Betancourt MD;  Location: [facility] ([address] [place] [address]);  Service: Endoscopy;  Laterality: N/A;  heart transplant candidate/ EF 25% - 2nd floor/ defib - Biotronik - ERW  Eliquis - per Dr. Cortez with [facility], Pt ok to hold Eliquis x 2 days prior-see media tab-outside correspondence dated 12/30/21  - [facility]  verbal instructions/portal instructions/email instructions - s    EYE SURGERY      due to running tears    FRACTURE SURGERY Left     hand 5th digit    HYSTERECTOMY      KNEE SURGERY Left 2016    hematoma    LIVER BIOPSY  10/24/2018    Minimal steatosis, predominantly macrovesicular, 1%, Minimal nonspecific portal inflammation, no fibrosis. No findings on biopsy to explain elevated bilirubin levels. Could be d/t Gilbert's =?- hemolysis    RIGHT HEART CATHETERIZATION Right 12/07/2021    Procedure: INSERTION, CATHETER, RIGHT HEART;  Surgeon: Irving Cardenas MD;  Location:  [facility] CATH LAB;  Service: Cardiology;  Laterality: Right;    RIGHT HEART CATHETERIZATION Right 2022    Procedure: INSERTION, CATHETER, RIGHT HEART;  Surgeon: Burke Camilo MD;  Location: [facility] CATH LAB;  Service: Cardiology;  Laterality: Right;    RIGHT HEART CATHETERIZATION Right 2023    Procedure: INSERTION, CATHETER, RIGHT HEART;  Surgeon: Katie Liriano DO;  Location: [facility] CATH LAB;  Service: Cardiology;  Laterality: Right;    TRANSJUGULAR BIOPSY OF LIVER N/A 10/24/2018    Procedure: BIOPSY, LIVER, TRANSJUGULAR APPROACH;  Surgeon: Carmen Diagnostic Provider;  Location: [facility] OR [address] [place] [address];  Service: Radiology;  Laterality: N/A;     Family History   Problem Relation Name Age of Onset    Cancer Mother          pancreatic CA early 50's    Heart disease Father           MI in late 50's    Hypertension Father      Heart attack Father      Heart disease Sister      Heart disease Brother      Cirrhosis Brother          alcoholic    Heart disease Sister      Heart disease Brother      Hypertension Brother      Diabetes Brother       Social History     Tobacco Use    Smoking status: Never    Smokeless tobacco: Never   Substance Use Topics    Alcohol use: Not Currently     Comment: up to 1 yr ago drank 2-3 drinks on occasion but sporadic    Drug use: No     Review of Systems   Respiratory:  Positive for shortness of breath.    Neurological:  Positive for weakness.       Physical Exam     Initial Vitals [24]   BP Pulse Resp Temp SpO2   (!) 144/95 (!) 120 (!) 24 99 °F (37.2 °C) 99 %      MAP       --         Physical Exam    Nursing note and vitals reviewed.  Constitutional: She appears well-developed and well-nourished. She is not diaphoretic.   HENT:   Head: Normocephalic and atraumatic.   Mouth/Throat: Oropharynx is clear and moist.   Eyes: EOM are normal. Pupils are equal, round, and reactive to light. Right eye exhibits no discharge. Left eye exhibits no discharge.   Neck: No tracheal  deviation present.   Normal range of motion.  Cardiovascular:  Normal rate, regular rhythm and intact distal pulses.           Pulmonary/Chest: She is in respiratory distress. She has no wheezes. She has rales. She exhibits no tenderness.   Abdominal: Abdomen is soft. She exhibits distension. There is no abdominal tenderness.   Musculoskeletal:         General: No tenderness or edema. Normal range of motion.      Cervical back: Normal range of motion.     Neurological: She is alert and oriented to person, place, and time. She has normal strength. No cranial nerve deficit or sensory deficit. GCS eye subscore is 4. GCS verbal subscore is 5. GCS motor subscore is 6.   Skin: Skin is warm and dry. No rash noted.   Psychiatric: She has a normal mood and affect. Her behavior is normal. Thought content normal.         ED Course   Procedures  Labs Reviewed   CBC W/ AUTO DIFFERENTIAL - Abnormal       Result Value    WBC 5.41      RBC 5.75 (*)     Hemoglobin 10.9 (*)     Hematocrit 39.3      MCV 68 (*)     MCH 19.0 (*)     MCHC 27.7 (*)     RDW 30.7 (*)     Platelets 153      MPV SEE COMMENT      Immature Granulocytes Test Not Performed      Gran # (ANC) Test Not Performed      Immature Grans (Abs) Test Not Performed      Lymph # Test Not Performed      Mono # Test Not Performed      Eos # Test Not Performed      Baso # Test Not Performed      nRBC 8 (*)     Gran % 84.0 (*)     Lymph % 9.0 (*)     Mono % 5.0      Eosinophil % 0.0      Basophil % 0.0      Metamyelocytes 1.0      Myelocytes 1.0      Platelet Estimate Appears normal      Aniso Moderate      Poik Marked      Poly Occasional      Hypo Occasional      Ovalocytes Moderate      Target Cells Occasional      Tear Drop Cells Moderate      [place] Cells Moderate      Stomatocytes CANCELED      Spherocytes Occasional      Acanthocytes Present      Schistocytes Present      Basophilic Stippling Occasional      Phillips-Jolly Bodies Occasional      Toxic Granulation Present       Large/Giant Platelets Present      Fragmented Cells Marked      Vacuolated Granulocytes Present      Differential Method Automated     COMPREHENSIVE METABOLIC PANEL - Abnormal    Sodium 139      Potassium 4.9      Chloride 101      CO2 26      Glucose 105      BUN 86 (*)     Creatinine 3.3 (*)     Calcium 10.1      Total Protein 6.3      Albumin 3.5      Total Bilirubin 2.6 (*)     Alkaline Phosphatase 52 (*)     AST 26      ALT 16      eGFR 15.6 (*)     Anion Gap 12     TROPONIN I - Abnormal    Troponin I 1.867 (*)    B-TYPE NATRIURETIC PEPTIDE - Abnormal    BNP 2,951 (*)    PHOSPHORUS - Abnormal    Phosphorus 4.9 (*)    TROPONIN I - Abnormal    Troponin I 1.776 (*)    CBC W/ AUTO DIFFERENTIAL - Abnormal    WBC 4.62      RBC 5.64 (*)     Hemoglobin 10.6 (*)     Hematocrit 38.0      MCV 67 (*)     MCH 18.8 (*)     MCHC 27.9 (*)     RDW 32.3 (*)     Platelets 157      MPV SEE COMMENT     COMPREHENSIVE METABOLIC PANEL - Abnormal    Sodium 137      Potassium 5.3 (*)     Chloride 100      CO2 21 (*)     Glucose 92      BUN 87 (*)     Creatinine 3.6 (*)     Calcium 9.5      Total Protein 6.4      Albumin 3.4 (*)     Total Bilirubin 2.7 (*)     Alkaline Phosphatase 51 (*)     AST 36      ALT 17      eGFR 14.0 (*)     Anion Gap 16     PHOSPHORUS - Abnormal    Phosphorus 5.2 (*)    HIV 1 / 2 ANTIBODY    HIV 1/2 Ag/Ab Non-reactive      Narrative:     Release to patient->Immediate   HEPATITIS C ANTIBODY    Hepatitis C Ab Non-reactive      Narrative:     Release to patient->Immediate   MAGNESIUM    Magnesium 2.3     MAGNESIUM    Magnesium 2.3     SARS-COV2 (COVID) WITH FLU/RSV BY PCR     EKG Readings: (Independently Interpreted)   EKG with sinus tachycardia, regular rhythm, normal axis, no acute ST elevations or depressions, normal [place], QRS and QT interval. Interpreted by me.         Imaging Results              X-Ray Chest AP Portable (Final result)  Result time 08/07/24 00:46:53      Final result by Rogelio Motta DO  (08/07/24 00:46:53)                   Impression:      No acute abnormality.    Cardiomegaly.      Electronically signed by: Rogelio Motta  Date:    08/07/2024  Time:    00:46               Narrative:    EXAMINATION:  XR CHEST AP PORTABLE    CLINICAL HISTORY:  CHF;    TECHNIQUE:  Single frontal view of the chest was performed.    COMPARISON:  07/22/2024.    FINDINGS:  There is a right-sided PICC with the tip overlying the mid SVC.  There is a cardiac pacer/AICD, unchanged.  The lungs are well expanded and clear.  No focal opacities are seen.  The pleural spaces are clear.  The cardiac silhouette is enlarged.  There are calcifications of the aortic arch.  Osseous structures are intact.                                    X-Rays:   Independently Interpreted Readings:   Other Readings:  Chest x-ray consistent with cardiomegaly    Medications   DOBUtamine 1000 mg in D5W 250 mL infusion (5 mcg/kg/min × 78 kg Intravenous New Bag 8/7/24 0357)   sodium chloride 0.9% flush 10 mL (has no administration in time range)   naloxone 0.4 mg/mL injection 0.02 mg (has no administration in time range)   glucose chewable tablet 16 g (has no administration in time range)   glucose chewable tablet 24 g (has no administration in time range)   dextrose 10% bolus 125 mL 125 mL (has no administration in time range)   dextrose 10% bolus 250 mL 250 mL (has no administration in time range)   glucagon (human recombinant) injection 1 mg (has no administration in time range)   allopurinoL tablet 300 mg (has no administration in time range)   amiodarone tablet 200 mg (has no administration in time range)   apixaban tablet 2.5 mg (has no administration in time range)   aspirin EC tablet 81 mg (has no administration in time range)   atorvastatin tablet 40 mg (40 mg Oral Given 8/7/24 0434)   bumetanide tablet 2 mg (has no administration in time range)   hydrALAZINE 10 mg 1 tablet, hydrALAZINE 25 mg 1 tablet, isorsorbide dinitrate 20 mg 1 tablet  combination (has no administration in time range)   metOLazone tablet 5 mg (has no administration in time range)   pantoprazole EC tablet 40 mg (has no administration in time range)   pregabalin capsule 50 mg (50 mg Oral Given 8/7/24 1964)   tiotropium bromide 2.5 mcg/actuation inhaler 2 puff (has no administration in time range)   dextrose 10% bolus 250 mL 250 mL (has no administration in time range)   ondansetron injection 4 mg (has no administration in time range)   furosemide injection 40 mg (40 mg Intravenous Given 8/7/24 2926)     Medical Decision Making  58-year-old female with history of CHF with reduced ejection fraction on dobutamine at home presents now for worsening shortness of breath, fatigue, decreased appetite.  Vitals here notable initially for tachycardia at 1:20 a.m., tachypnea 24, normal blood pressure, normal oxygenation.  Symptoms most concerning for end-stage CHF.  Labs support that assessment.  Will diurese, admit to hospital medicine.    Amount and/or Complexity of Data Reviewed  Labs: ordered.  Radiology: ordered.    Risk  Prescription drug management.  Decision regarding hospitalization.               ED Course as of 08/07/24 0523   Wed Aug 07, 2024   0520 Phosphorus(!) [BS]   0521 Magnesium [BS]   0521 Comprehensive Metabolic Panel(!) [BS]   0521 CBC Auto Differential(!) [BS]   0521 SARS-Cov2 (COVID) with FLU/RSV by PCR [BS]   0521 Troponin I(!) [BS]   0521 CBC auto differential(!) [BS]   0521 Brain natriuretic peptide(!) [BS]   0521 HIV 1/2 Ag/Ab (4th Gen) [BS]      ED Course User Index  [BS] Brennon Cottrell MD                           Clinical Impression:  Final diagnoses:  [R06.02] Shortness of breath          ED Disposition Condition    Admit                 Brennon Cottrell MD  08/07/24 0523

## 2024-08-07 NOTE — ASSESSMENT & PLAN NOTE
See NICM for more information. Other Ddx include workup for PE. Patient on Eliquis 2.5mg BID for afib, so less likely. Well's Score of PE was 0, so opted against further eval.

## 2024-08-07 NOTE — HPI
Hafsa Hawley is 58 y.o. F with Mhx of NICM with HFrEF (10-15%) on home dobutamine, end stage heart failure, s/p AICD, presents now for worsening shortness of breath, waking up at night very short of breath, decreased exercise tolerance, decreased appetite, and generalized fatigue. Symptoms have been ongoing for the past 4 days and worsened last night into today. She reports wanting to stay full treatment as she hopes to make it to [place] and [place] for tranplant eval. Recent admission for similar presentation 07/24. Has home PRN oxygen. She reports using her diuretic regiment as PRNs. She expresses frustrations in the continuous changes in her medications which is the reason she is not complaint and unable to qualify for transplant at [facility]. Full ROS below. Lives with family. Ambulates independently. Is established with palliative care outpatient.

## 2024-08-07 NOTE — ASSESSMENT & PLAN NOTE
Chronic trop elevation. Trop 1.867 > 1.776. EKG with sinus tach and PAC, w/o ischemic changes on personal review. Will continue to monitor.

## 2024-08-07 NOTE — H&P
[facility] - Emergency Dept  [facility] Medicine  History & Physical    Patient Name: Hafsa Hawley  MRN: [id number]  Patient Class: IP- Inpatient  Admission Date: 8/6/2024  Attending Physician: Siddhartha Armstrong MD   Primary Care Provider: Cristobal Ann MD         Patient information was obtained from patient, past medical records, and ER records.     Subjective:     Principal Problem:End stage heart failure    Chief Complaint:   Chief Complaint   Patient presents with    Shortness of Breath    Weakness    Fatigue     Ongoing since last night. History CHF, COPD, stage 4 kidney disease, EF 10%. PICC line on dobutamine.         HPI: Hafsa Hawley is 58 y.o. F with Mhx of NICM with HFrEF (10-15%) on home dobutamine, end stage heart failure, s/p AICD, presents now for worsening shortness of breath, waking up at night very short of breath, decreased exercise tolerance, decreased appetite, and generalized fatigue. Symptoms have been ongoing for the past 4 days and worsened last night into today. She reports wanting to stay full treatment as she hopes to make it to [place] and [place] for tranplant eval. Recent admission for similar presentation 07/24. Has home PRN oxygen. She reports using her diuretic regiment as PRNs. She expresses frustrations in the continuous changes in her medications which is the reason she is not complaint and unable to qualify for transplant at [facility]. Full ROS below. Lives with family. Ambulates independently. Is established with palliative care outpatient.      Past Medical History:   Diagnosis Date    Allergy     Anemia     Arthritis     Atrial fibrillation     OAC    BMI 32.0-32.9,adult 02/22/2023    Chronic respiratory failure with hypoxia, on home oxygen therapy     2L with activity, off at rest.  Per Pulm  no overt evidence of ILD or COPD on PFTs and CT to explain O2 needs.    CKD (chronic kidney disease), stage IV 05/08/2018    Congestive heart failure     s/p AICD placement,     Deep vein thrombosis     Depression     elevated bilirubin d/t Gilbert's syndrome     confirmed by [place] genetic testing, evaluated by hepatology    Encounter for blood transfusion     GERD (gastroesophageal reflux disease)     Hypertension     Pheochromocytoma, malignant     Right kidney mass     Sleep apnea     Thalassemia trait, alpha     Thyroid disease     Type 2 diabetes mellitus with hyperglycemia, without long-term current use of insulin 08/13/2020       Past Surgical History:   Procedure Laterality Date    ANGIOGRAM, ABDOMINAL AORTA Right 04/15/2024    APPENDECTOMY      BONE MARROW BIOPSY      CARDIAC DEFIBRILLATOR PLACEMENT Left 12/2016    CARDIAC ELECTROPHYSIOLOGY MAPPING AND ABLATION      CARDIAC ELECTROPHYSIOLOGY MAPPING AND ABLATION      COLONOSCOPY N/A 05/06/2022    Procedure: COLONOSCOPY;  Surgeon: Arely Betancourt MD;  Location: [facility] ENDO (2ND FLR);  Service: Endoscopy;  Laterality: N/A;  heart transplant candidate/ EF 25% - 2nd floor/ defib - Biotronik - ERW  Eliquis - per Dr. Cortez with [facility] [place], Pt ok to hold Eliquis x 2 days prior-see media tab-outside correspondence dated 12/30/21  - ERW  verbal instructions/portal instructions/email instructions - s    EYE SURGERY      due to running tears    FRACTURE SURGERY Left     hand 5th digit    HYSTERECTOMY      KNEE SURGERY Left 2016    hematoma    LIVER BIOPSY  10/24/2018    Minimal steatosis, predominantly macrovesicular, 1%, Minimal nonspecific portal inflammation, no fibrosis. No findings on biopsy to explain elevated bilirubin levels. Could be d/t Gilbert's =?- hemolysis    RIGHT HEART CATHETERIZATION Right 12/07/2021    Procedure: INSERTION, CATHETER, RIGHT HEART;  Surgeon: Irving Cardenas MD;  Location: [facility] CATH LAB;  Service: Cardiology;  Laterality: Right;    RIGHT HEART CATHETERIZATION Right 12/19/2022    Procedure: INSERTION, CATHETER, RIGHT HEART;  Surgeon: Burke Camilo MD;  Location: [facility] CATH LAB;  Service: Cardiology;   "Laterality: Right;    RIGHT HEART CATHETERIZATION Right 03/29/2023    Procedure: INSERTION, CATHETER, RIGHT HEART;  Surgeon: Katie Liriano DO;  Location: [facility] CATH LAB;  Service: Cardiology;  Laterality: Right;    TRANSJUGULAR BIOPSY OF LIVER N/A 10/24/2018    Procedure: BIOPSY, LIVER, TRANSJUGULAR APPROACH;  Surgeon: Carmen Diagnostic Provider;  Location: [facility] OR [address] [place] [address];  Service: Radiology;  Laterality: N/A;       Review of patient's allergies indicates:   Allergen Reactions    Penicillins Hives and Other (See Comments)    Iodinated contrast media Nausea And Vomiting    Oxycodone-acetaminophen Other (See Comments)     Nausea, Dizziness, Anxiety.  "I don't like how it makes me feel."   Given Hydromorphone 0.5mg IVP  Without problems.  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 Metabolism of other drugs not listed here may also be impacted by UGT1A1 enzyme activity.       Tramadol Nausea And Vomiting and Other (See Comments)     Other reaction(s): Other (See Comments)    Valsartan Other (See Comments)       Current Facility-Administered Medications on File Prior to Encounter   Medication    0.9%  NaCl infusion     Current Outpatient Medications on File Prior to Encounter   Medication Sig    acetaminophen (TYLENOL) 650 MG TbSR Take 1 tablet (650 mg total) by mouth every 8 (eight) hours. (Patient taking differently: Take 650 mg by mouth daily as needed (pain).)    " albuterol-ipratropium (DUO-NEB) 2.5 mg-0.5 mg/3 mL nebulizer solution Take 3 mLs by nebulization every 6 (six) hours as needed for Wheezing or Shortness of Breath. (Patient taking differently: Take 3 mLs by nebulization every 8 (eight) hours as needed for Wheezing or Shortness of Breath.)    allopurinoL (ZYLOPRIM) 300 MG tablet Take 1 tablet (300 mg total) by mouth once daily.    ALPRAZolam (XANAX) 2 MG Tab Take 1 tablet orally 2 times a day. (Patient taking differently: Take 2 mg by mouth daily as needed (anxiety).)    amiodarone (PACERONE) 200 MG Tab Take 1 tablet orally once a day.    amiodarone (PACERONE) 200 MG Tab take one tablet by mouth daily    apixaban (ELIQUIS) 2.5 mg Tab Take 1 tablet orally 2 times a day.    apixaban (ELIQUIS) 2.5 mg Tab take one tablet by mouth twice a day    aspirin (ECOTRIN) 81 MG EC tablet Take 81 mg by mouth once daily.    atorvastatin (LIPITOR) 40 MG tablet Take 1 tablet (40 mg total) by mouth every evening.    atorvastatin (LIPITOR) 40 MG tablet Take 1 tablet orally once in the evening.    atorvastatin (LIPITOR) 40 MG tablet take one tablet by mouth in the evening    blood-glucose sensor (DEXCOM G7 SENSOR) Evon change sensor every 10 days.    bumetanide (BUMEX) 2 MG tablet Take 1 tablet orally once a day. (Patient taking differently: Take 2 mg by mouth 2 (two) times a day.)    bumetanide (BUMEX) 2 MG tablet take one tablet by mouth daily    cetirizine (ZYRTEC) 10 MG tablet Take 1 tablet (10 mg total) by mouth once daily.    cyanocobalamin (VITAMIN B-12) 1000 MCG tablet Take 1,000 mcg by mouth once daily.    DOBUTamine (DOBUTREX) 1,000 mg/250 mL (4,000 mcg/mL) infusion Inject 409 mcg/min into the vein continuous.    empagliflozin (JARDIANCE) 25 mg tablet Take 1 tablet orally once a day. (Patient taking differently: Take 25 mg by mouth once daily.)    empagliflozin (JARDIANCE) 25 mg tablet take one tablet by mouth daily    ergocalciferol (VITAMIN D2) 50,000 unit Cap Take 1 capsule  orally once a  week (Patient taking differently: Take 50,000 Units by mouth every 7 days. Thursdays)    fentaNYL (DURAGESIC) 12 mcg/hr PT72 Place 1 patch onto the skin every 72 hours.    ferrous sulfate 325 (65 FE) MG EC tablet Take 325 mg by mouth once daily.    fluticasone propionate (FLONASE ALLERGY RELIEF) 50 mcg/actuation nasal spray Use 2 sprays (100 mcg total) by Each Nostril route once daily. (Patient taking differently: 2 sprays by Each Nostril route daily as needed for Rhinitis or Allergies.)    glimepiride (AMARYL) 1 MG tablet Take 1 tablet (1 mg total) by mouth before breakfast.    HYDROcodone-acetaminophen (NORCO)  mg per tablet Take 1 tablet by mouth every 6 (six) hours as needed for Pain.    isosorbide-hydrALAZINE 20-37.5 mg (BIDIL) 20-37.5 mg Tab Take 1 tablet by mouth 3 (three) times daily. (Patient taking differently: Take 1 tablet by mouth 2 (two) times daily.)    LIDOcaine (LIDODERM) 5 % Place 1 patch onto the skin once daily. Remove & discard patch within 12 hours or as directed by MD. (Patient taking differently: Place 1 patch onto the skin daily as needed (pain).)    magnesium oxide (MAG-OX) 400 mg (241.3 mg magnesium) tablet Take 1 tablet orally once a day. (Patient taking differently: Take 400 mg by mouth every evening.)    magnesium oxide (MAG-OX) 400 mg (241.3 mg magnesium) tablet take one tablet by mouth daily    metOLazone (ZAROXOLYN) 5 MG tablet Take 1 tablet orally once a day. (Patient taking differently: Take 5 mg by mouth once daily.)    metOLazone (ZAROXOLYN) 5 MG tablet take one tablet by mouth daily    metoprolol succinate (TOPROL-XL) 25 MG 24 hr tablet Take one-half tablet orally once a day. (Patient taking differently: Take 12.5 mg by mouth once daily.)    metoprolol succinate (TOPROL-XL) 25 MG 24 hr tablet Take 1 tablet orally once a day.    metoprolol succinate (TOPROL-XL) 25 MG 24 hr tablet Take one tablet by mouth daily    nitroGLYCERIN (NITROSTAT) 0.4 MG SL tablet Put  1 tab under the tongue every 5 minutes x 3 doses as needed for chest pain. Do not exceed 3 doses in 15 minutes. If no relief, go to ER.    ondansetron (ZOFRAN-ODT) 8 MG TbDL Dissolve 1 tablet (8 mg total) by mouth every 8 (eight) hours as needed (nausea). (Patient taking differently: Take 8 mg by mouth daily as needed (nausea).)    pantoprazole (PROTONIX) 40 MG tablet Take 1 tablet orally once a day.    pantoprazole (PROTONIX) 40 MG tablet Take one tablet by mouth daily    polyethylene glycol (MIRALAX) 17 gram/dose powder Take 17 g by mouth once daily. (Patient taking differently: Take 17 g by mouth daily as needed for Constipation.)    pregabalin (LYRICA) 50 MG capsule Take 1 capsule (50 mg total) by mouth every evening.    promethazine-codeine 6.25-10 mg/5 ml (PHENERGAN WITH CODEINE) 6.25-10 mg/5 mL syrup Take 5 mLs by mouth every 6 (six) hours as needed for Cough.    SPIRIVA WITH HANDIHALER 18 mcg inhalation capsule Inhale 1 capsule (18 mcg total) into the lungs once daily.    VENTOLIN HFA 90 mcg/actuation inhaler Inhale 2 puffs into the lungs every 6 (six) hours as needed for Shortness of Breath or Wheezing. (Patient taking differently: Inhale 2 puffs into the lungs daily as needed for Shortness of Breath or Wheezing.)     Family History       Problem Relation (Age of Onset)    Cancer Mother    Cirrhosis Brother    Diabetes Brother    Heart attack Father    Heart disease Father, Sister, Brother, Sister, Brother    Hypertension Father, Brother          Tobacco Use    Smoking status: Never    Smokeless tobacco: Never   Substance and Sexual Activity    Alcohol use: Not Currently     Comment: up to 1 yr ago drank 2-3 drinks on occasion but sporadic    Drug use: No    Sexual activity: Yes     Partners: Male     Review of Systems   Constitutional:  Positive for appetite change and chills. Negative for fever.   Respiratory:  Positive for cough (chronic. Reports not being able to get anything up.) and shortness of  breath (chronic).    Cardiovascular:  Negative for chest pain and leg swelling.   Gastrointestinal:  Positive for abdominal distention, abdominal pain and nausea. Negative for constipation, diarrhea and vomiting.   Genitourinary:  Negative for dysuria, frequency and urgency.   Neurological:  Positive for weakness. Negative for dizziness and light-headedness.     Objective:     Vital Signs (Most Recent):  Temp: 98.4 °F (36.9 °C) (08/07/24 0202)  Pulse: 99 (08/07/24 0556)  Resp: 19 (08/07/24 0556)  BP: 117/79 (08/07/24 0503)  SpO2: 99 % (08/07/24 0556) Vital Signs (24h Range):  Temp:  [98.4 °F (36.9 °C)-99 °F (37.2 °C)] 98.4 °F (36.9 °C)  Pulse:  [] 99  Resp:  [18-24] 19  SpO2:  [95 %-100 %] 99 %  BP: (105-144)/(76-95) 117/79     Weight: 78 kg (172 lb)  Body mass index is 27.76 kg/m².     Physical Exam  Vitals and nursing note reviewed.   Constitutional:       General: She is in acute distress.      Appearance: Normal appearance. She is obese. She is ill-appearing.   HENT:      Head: Normocephalic and atraumatic.   Eyes:      General: Scleral icterus present.   Cardiovascular:      Rate and Rhythm: Normal rate and regular rhythm.      Heart sounds: Murmur (s3) heard.      No friction rub.   Pulmonary:      Effort: Tachypnea present. No respiratory distress.      Breath sounds: Rales (minimal RLL crackles) present. No wheezing.      Comments: Shallow breathing. On 3.5L NC  Abdominal:      General: There is distension.      Tenderness: There is abdominal tenderness. There is no guarding.   Musculoskeletal:      Right lower leg: Edema (minimal piting edema) present.      Left lower leg: Edema present.      Comments: Minimal sacral edema; no gut wall edema   Skin:     Coloration: Skin is not jaundiced or pale.      Comments: PICC line in place   Neurological:      General: No focal deficit present.      Mental Status: She is alert and oriented to person, place, and time.      Motor: Weakness present.   Psychiatric:          Mood and Affect: Mood normal.         Thought Content: Thought content normal.         Judgment: Judgment normal.                Significant Labs: All pertinent labs within the past 24 hours have been reviewed.    Significant Imaging: I have reviewed all pertinent imaging results/findings within the past 24 hours.  Assessment/Plan:     * End stage heart failure  See NIC for further information      NICM (nonischemic cardiomyopathy)  End stage heart failure   Chronic combined systolic and diastolic heart failure    History of non-ischemic cardiomyopathy s/p AICD placement on palliative dobutamine and not a candidate for advanced therapies. Patient currently pursuing heart transplant options.  Recently admitted 07/26 for similar presentation. Last Echo with EF of 10-15%. CXR on this admission with stable cardiomegaly without evidence of new opacifications concerning for edema. WBC wnl. BNP 2951, Trop 1.867 > 1.776. EKG with sinus tach and PAC, w/o ischemic changes on personal review. Needing more supplemental Oxygen; has order for PRN 2L. COVID/Flu/RSV negative. ED gave lasix IV 40mg once.    She reports taking her diuretics (metolazone and bumex) as needed rather than daily as per her last discharge orders. High concern for medication non-adherence and health literacy.     Plan:  - continue home    - consider HTS consult for diuresis management  - changed home bumex 2mg PO to IV lasix 80mg qd; titrate as tolerated for goal diuresis of 2-3L/day  - continue home metolazone  - strict I/O  - daily weights  - Low sodium caridac diet  - replenish lytes as needed K>4, Phos>3, Mg>2        Acute hypoxic respiratory failure  See NIC for more information. Other Ddx include workup for PE. Patient on Eliquis 2.5mg BID for afib, so less likely. Well's Score of PE was 0, so opted against further eval.     Elevated troponin  Chronic trop elevation. Trop 1.867 > 1.776. EKG with sinus tach and PAC, w/o ischemic changes on  personal review. Will continue to monitor.     Hyperlipidemia, mixed  - continue home statin      Anemia of chronic disease  Anemia is likely due to chronic disease due to Chronic Kidney Disease. Most recent hemoglobin and hematocrit are listed below.  Recent Labs     08/06/24  2206 08/07/24  0446   HGB 10.9* 10.6*   HCT 39.3 38.0     Plan  - Monitor serial CBC: Daily  - Transfuse PRBC if patient becomes hemodynamically unstable, symptomatic or H/H drops below 7/21.      CKD (chronic kidney disease) stage 4, GFR 15-29 ml/min  Creatine stable for now. BMP reviewed- noted Estimated Creatinine Clearance: 18 mL/min (A) (based on SCr of 3.6 mg/dL (H)). according to latest data. Based on current GFR, CKD stage is stage 4 - GFR 15-29.  Monitor UOP and serial BMP and adjust therapy as needed. Renally dose meds. Avoid nephrotoxic medications and procedures.    Paroxysmal A-fib  Chronic. Stable.     Plan:  - continue home eliquis  - continue home amio  - tele    Gout, arthritis  - continue home allopurinol      Pulmonary HTN  Known. WCTM.      Abdominal pain  elevated bilirubin d/t Gilbert's syndrome    Diffuse pain to palpation. Pt says chronic however tenderness on minimal palpation. Tbili 2.6. LFTs wnl.     Plan:  - f/u CT a/p noncontrast       Essential hypertension  - continue home BP medications as tolerated      VTE Risk Mitigation (From admission, onward)           Ordered     apixaban tablet 2.5 mg  2 times daily         08/07/24 0404     IP VTE HIGH RISK PATIENT  Once         08/07/24 0333     Place sequential compression device  Until discontinued         08/07/24 0333                                    Diane Myrick MD  Department of Hospital Medicine  [facility] - Emergency Dept

## 2024-08-07 NOTE — ASSESSMENT & PLAN NOTE
Anemia is likely due to chronic disease due to Chronic Kidney Disease. Most recent hemoglobin and hematocrit are listed below.  Recent Labs     08/06/24  2206 08/07/24  0446   HGB 10.9* 10.6*   HCT 39.3 38.0     Plan  - Monitor serial CBC: Daily  - Transfuse PRBC if patient becomes hemodynamically unstable, symptomatic or H/H drops below 7/21.

## 2024-08-07 NOTE — ASSESSMENT & PLAN NOTE
Creatine stable for now. BMP reviewed- noted Estimated Creatinine Clearance: 18 mL/min (A) (based on SCr of 3.6 mg/dL (H)). according to latest data. Based on current GFR, CKD stage is stage 4 - GFR 15-29.  Monitor UOP and serial BMP and adjust therapy as needed. Renally dose meds. Avoid nephrotoxic medications and procedures.

## 2024-08-07 NOTE — SUBJECTIVE & OBJECTIVE
Past Medical History:   Diagnosis Date    Allergy     Anemia     Arthritis     Atrial fibrillation     OAC    BMI 32.0-32.9,adult 02/22/2023    Chronic respiratory failure with hypoxia, on home oxygen therapy     2L with activity, off at rest.  Per Pulm  no overt evidence of ILD or COPD on PFTs and CT to explain O2 needs.    CKD (chronic kidney disease), stage IV 05/08/2018    Congestive heart failure     s/p AICD placement,    Deep vein thrombosis     Depression     elevated bilirubin d/t Gilbert's syndrome     confirmed by [place] genetic testing, evaluated by hepatology    Encounter for blood transfusion     GERD (gastroesophageal reflux disease)     Hypertension     Pheochromocytoma, malignant     Right kidney mass     Sleep apnea     Thalassemia trait, alpha     Thyroid disease     Type 2 diabetes mellitus with hyperglycemia, without long-term current use of insulin 08/13/2020       Past Surgical History:   Procedure Laterality Date    ANGIOGRAM, ABDOMINAL AORTA Right 04/15/2024    APPENDECTOMY      BONE MARROW BIOPSY      CARDIAC DEFIBRILLATOR PLACEMENT Left 12/2016    CARDIAC ELECTROPHYSIOLOGY MAPPING AND ABLATION      CARDIAC ELECTROPHYSIOLOGY MAPPING AND ABLATION      COLONOSCOPY N/A 05/06/2022    Procedure: COLONOSCOPY;  Surgeon: Arely Betancourt MD;  Location: [facility] ([address] [place] [address]);  Service: Endoscopy;  Laterality: N/A;  heart transplant candidate/ EF 25% - 2nd floor/ defib - Biotronik - ERW  Eliquis - per Dr. Cortez with [facility], Pt ok to hold Eliquis x 2 days prior-see media tab-outside correspondence dated 12/30/21  - ER[facility]  verbal instructions/portal instructions/email instructions - s    EYE SURGERY      due to running tears    FRACTURE SURGERY Left     hand 5th digit    HYSTERECTOMY      KNEE SURGERY Left 2016    hematoma    LIVER BIOPSY  10/24/2018    Minimal steatosis, predominantly macrovesicular, 1%, Minimal nonspecific portal inflammation, no fibrosis. No findings on biopsy to explain elevated  "bilirubin levels. Could be d/t Gilbert's =?- hemolysis    RIGHT HEART CATHETERIZATION Right 12/07/2021    Procedure: INSERTION, CATHETER, RIGHT HEART;  Surgeon: Irving Cardenas MD;  Location: [facility] CATH LAB;  Service: Cardiology;  Laterality: Right;    RIGHT HEART CATHETERIZATION Right 12/19/2022    Procedure: INSERTION, CATHETER, RIGHT HEART;  Surgeon: Burke Camilo MD;  Location: [facility] CATH LAB;  Service: Cardiology;  Laterality: Right;    RIGHT HEART CATHETERIZATION Right 03/29/2023    Procedure: INSERTION, CATHETER, RIGHT HEART;  Surgeon: Katie Liriano DO;  Location: [facility] CATH LAB;  Service: Cardiology;  Laterality: Right;    TRANSJUGULAR BIOPSY OF LIVER N/A 10/24/2018    Procedure: BIOPSY, LIVER, TRANSJUGULAR APPROACH;  Surgeon: Carmen Diagnostic Provider;  Location: [facility] OR [address] [place] [address];  Service: Radiology;  Laterality: N/A;       Review of patient's allergies indicates:   Allergen Reactions    Penicillins Hives and Other (See Comments)    Iodinated contrast media Nausea And Vomiting    Oxycodone-acetaminophen Other (See Comments)     Nausea, Dizziness, Anxiety.  "I don't like how it makes me feel."   Given Hydromorphone 0.5mg IVP  Without problems.  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 Metabolism of other drugs not listed here may also be impacted by UGT1A1 enzyme activity.       Tramadol Nausea And Vomiting and Other (See Comments)     Other " reaction(s): Other (See Comments)    Valsartan Other (See Comments)       Current Facility-Administered Medications on File Prior to Encounter   Medication    0.9%  NaCl infusion     Current Outpatient Medications on File Prior to Encounter   Medication Sig    acetaminophen (TYLENOL) 650 MG TbSR Take 1 tablet (650 mg total) by mouth every 8 (eight) hours. (Patient taking differently: Take 650 mg by mouth daily as needed (pain).)    albuterol-ipratropium (DUO-NEB) 2.5 mg-0.5 mg/3 mL nebulizer solution Take 3 mLs by nebulization every 6 (six) hours as needed for Wheezing or Shortness of Breath. (Patient taking differently: Take 3 mLs by nebulization every 8 (eight) hours as needed for Wheezing or Shortness of Breath.)    allopurinoL (ZYLOPRIM) 300 MG tablet Take 1 tablet (300 mg total) by mouth once daily.    ALPRAZolam (XANAX) 2 MG Tab Take 1 tablet orally 2 times a day. (Patient taking differently: Take 2 mg by mouth daily as needed (anxiety).)    amiodarone (PACERONE) 200 MG Tab Take 1 tablet orally once a day.    amiodarone (PACERONE) 200 MG Tab take one tablet by mouth daily    apixaban (ELIQUIS) 2.5 mg Tab Take 1 tablet orally 2 times a day.    apixaban (ELIQUIS) 2.5 mg Tab take one tablet by mouth twice a day    aspirin (ECOTRIN) 81 MG EC tablet Take 81 mg by mouth once daily.    atorvastatin (LIPITOR) 40 MG tablet Take 1 tablet (40 mg total) by mouth every evening.    atorvastatin (LIPITOR) 40 MG tablet Take 1 tablet orally once in the evening.    atorvastatin (LIPITOR) 40 MG tablet take one tablet by mouth in the evening    blood-glucose sensor (DEXCOM G7 SENSOR) Evon change sensor every 10 days.    bumetanide (BUMEX) 2 MG tablet Take 1 tablet orally once a day. (Patient taking differently: Take 2 mg by mouth 2 (two) times a day.)    bumetanide (BUMEX) 2 MG tablet take one tablet by mouth daily    cetirizine (ZYRTEC) 10 MG tablet Take 1 tablet (10 mg total) by mouth once daily.    cyanocobalamin (VITAMIN  B-12) 1000 MCG tablet Take 1,000 mcg by mouth once daily.    DOBUTamine (DOBUTREX) 1,000 mg/250 mL (4,000 mcg/mL) infusion Inject 409 mcg/min into the vein continuous.    empagliflozin (JARDIANCE) 25 mg tablet Take 1 tablet orally once a day. (Patient taking differently: Take 25 mg by mouth once daily.)    empagliflozin (JARDIANCE) 25 mg tablet take one tablet by mouth daily    ergocalciferol (VITAMIN D2) 50,000 unit Cap Take 1 capsule orally once a  week (Patient taking differently: Take 50,000 Units by mouth every 7 days. Thursdays)    fentaNYL (DURAGESIC) 12 mcg/hr PT72 Place 1 patch onto the skin every 72 hours.    ferrous sulfate 325 (65 FE) MG EC tablet Take 325 mg by mouth once daily.    fluticasone propionate (FLONASE ALLERGY RELIEF) 50 mcg/actuation nasal spray Use 2 sprays (100 mcg total) by Each Nostril route once daily. (Patient taking differently: 2 sprays by Each Nostril route daily as needed for Rhinitis or Allergies.)    glimepiride (AMARYL) 1 MG tablet Take 1 tablet (1 mg total) by mouth before breakfast.    HYDROcodone-acetaminophen (NORCO)  mg per tablet Take 1 tablet by mouth every 6 (six) hours as needed for Pain.    isosorbide-hydrALAZINE 20-37.5 mg (BIDIL) 20-37.5 mg Tab Take 1 tablet by mouth 3 (three) times daily. (Patient taking differently: Take 1 tablet by mouth 2 (two) times daily.)    LIDOcaine (LIDODERM) 5 % Place 1 patch onto the skin once daily. Remove & discard patch within 12 hours or as directed by MD. (Patient taking differently: Place 1 patch onto the skin daily as needed (pain).)    magnesium oxide (MAG-OX) 400 mg (241.3 mg magnesium) tablet Take 1 tablet orally once a day. (Patient taking differently: Take 400 mg by mouth every evening.)    magnesium oxide (MAG-OX) 400 mg (241.3 mg magnesium) tablet take one tablet by mouth daily    metOLazone (ZAROXOLYN) 5 MG tablet Take 1 tablet orally once a day. (Patient taking differently: Take 5 mg by mouth once daily.)     metOLazone (ZAROXOLYN) 5 MG tablet take one tablet by mouth daily    metoprolol succinate (TOPROL-XL) 25 MG 24 hr tablet Take one-half tablet orally once a day. (Patient taking differently: Take 12.5 mg by mouth once daily.)    metoprolol succinate (TOPROL-XL) 25 MG 24 hr tablet Take 1 tablet orally once a day.    metoprolol succinate (TOPROL-XL) 25 MG 24 hr tablet Take one tablet by mouth daily    nitroGLYCERIN (NITROSTAT) 0.4 MG SL tablet Put 1 tab under the tongue every 5 minutes x 3 doses as needed for chest pain. Do not exceed 3 doses in 15 minutes. If no relief, go to ER.    ondansetron (ZOFRAN-ODT) 8 MG TbDL Dissolve 1 tablet (8 mg total) by mouth every 8 (eight) hours as needed (nausea). (Patient taking differently: Take 8 mg by mouth daily as needed (nausea).)    pantoprazole (PROTONIX) 40 MG tablet Take 1 tablet orally once a day.    pantoprazole (PROTONIX) 40 MG tablet Take one tablet by mouth daily    polyethylene glycol (MIRALAX) 17 gram/dose powder Take 17 g by mouth once daily. (Patient taking differently: Take 17 g by mouth daily as needed for Constipation.)    pregabalin (LYRICA) 50 MG capsule Take 1 capsule (50 mg total) by mouth every evening.    promethazine-codeine 6.25-10 mg/5 ml (PHENERGAN WITH CODEINE) 6.25-10 mg/5 mL syrup Take 5 mLs by mouth every 6 (six) hours as needed for Cough.    SPIRIVA WITH HANDIHALER 18 mcg inhalation capsule Inhale 1 capsule (18 mcg total) into the lungs once daily.    VENTOLIN HFA 90 mcg/actuation inhaler Inhale 2 puffs into the lungs every 6 (six) hours as needed for Shortness of Breath or Wheezing. (Patient taking differently: Inhale 2 puffs into the lungs daily as needed for Shortness of Breath or Wheezing.)     Family History       Problem Relation (Age of Onset)    Cancer Mother    Cirrhosis Brother    Diabetes Brother    Heart attack Father    Heart disease Father, Sister, Brother, Sister, Brother    Hypertension Father, Brother          Tobacco Use     Smoking status: Never    Smokeless tobacco: Never   Substance and Sexual Activity    Alcohol use: Not Currently     Comment: up to 1 yr ago drank 2-3 drinks on occasion but sporadic    Drug use: No    Sexual activity: Yes     Partners: Male     Review of Systems   Constitutional:  Positive for appetite change and chills. Negative for fever.   Respiratory:  Positive for cough (chronic. Reports not being able to get anything up.) and shortness of breath (chronic).    Cardiovascular:  Negative for chest pain and leg swelling.   Gastrointestinal:  Positive for abdominal distention, abdominal pain and nausea. Negative for constipation, diarrhea and vomiting.   Genitourinary:  Negative for dysuria, frequency and urgency.   Neurological:  Positive for weakness. Negative for dizziness and light-headedness.     Objective:     Vital Signs (Most Recent):  Temp: 98.4 °F (36.9 °C) (08/07/24 0202)  Pulse: 99 (08/07/24 0556)  Resp: 19 (08/07/24 0556)  BP: 117/79 (08/07/24 0503)  SpO2: 99 % (08/07/24 0556) Vital Signs (24h Range):  Temp:  [98.4 °F (36.9 °C)-99 °F (37.2 °C)] 98.4 °F (36.9 °C)  Pulse:  [] 99  Resp:  [18-24] 19  SpO2:  [95 %-100 %] 99 %  BP: (105-144)/(76-95) 117/79     Weight: 78 kg (172 lb)  Body mass index is 27.76 kg/m².     Physical Exam  Vitals and nursing note reviewed.   Constitutional:       General: She is in acute distress.      Appearance: Normal appearance. She is obese. She is ill-appearing.   HENT:      Head: Normocephalic and atraumatic.   Eyes:      General: Scleral icterus present.   Cardiovascular:      Rate and Rhythm: Normal rate and regular rhythm.      Heart sounds: Murmur (s3) heard.      No friction rub.   Pulmonary:      Effort: Tachypnea present. No respiratory distress.      Breath sounds: Rales (minimal RLL crackles) present. No wheezing.      Comments: Shallow breathing. On 3.5L NC  Abdominal:      General: There is distension.      Tenderness: There is abdominal tenderness. There  is no guarding.   Musculoskeletal:      Right lower leg: Edema (minimal piting edema) present.      Left lower leg: Edema present.      Comments: Minimal sacral edema; no gut wall edema   Skin:     Coloration: Skin is not jaundiced or pale.      Comments: PICC line in place   Neurological:      General: No focal deficit present.      Mental Status: She is alert and oriented to person, place, and time.      Motor: Weakness present.   Psychiatric:         Mood and Affect: Mood normal.         Thought Content: Thought content normal.         Judgment: Judgment normal.                Significant Labs: All pertinent labs within the past 24 hours have been reviewed.    Significant Imaging: I have reviewed all pertinent imaging results/findings within the past 24 hours.

## 2024-08-08 LAB
ALBUMIN SERPL BCP-MCNC: 3.4 G/DL (ref 3.5–5.2)
ALP SERPL-CCNC: 49 U/L (ref 55–135)
ALT SERPL W/O P-5'-P-CCNC: 15 U/L (ref 10–44)
ANION GAP SERPL CALC-SCNC: 15 MMOL/L (ref 8–16)
ANISOCYTOSIS BLD QL SMEAR: ABNORMAL
AST SERPL-CCNC: 17 U/L (ref 10–40)
BASOPHILS # BLD AUTO: 0.03 K/UL (ref 0–0.2)
BASOPHILS NFR BLD: 0.6 % (ref 0–1.9)
BILIRUB SERPL-MCNC: 2.1 MG/DL (ref 0.1–1)
BUN SERPL-MCNC: 102 MG/DL (ref 6–20)
CALCIUM SERPL-MCNC: 9.2 MG/DL (ref 8.7–10.5)
CHLORIDE SERPL-SCNC: 97 MMOL/L (ref 95–110)
CO2 SERPL-SCNC: 23 MMOL/L (ref 23–29)
CREAT SERPL-MCNC: 4.1 MG/DL (ref 0.5–1.4)
DIFFERENTIAL METHOD BLD: ABNORMAL
EOSINOPHIL # BLD AUTO: 0.1 K/UL (ref 0–0.5)
EOSINOPHIL NFR BLD: 1.9 % (ref 0–8)
ERYTHROCYTE [DISTWIDTH] IN BLOOD BY AUTOMATED COUNT: 30.4 % (ref 11.5–14.5)
EST. GFR  (NO RACE VARIABLE): 12 ML/MIN/1.73 M^2
GLUCOSE SERPL-MCNC: 66 MG/DL (ref 70–110)
HCT VFR BLD AUTO: 36.5 % (ref 37–48.5)
HGB BLD-MCNC: 10.3 G/DL (ref 12–16)
HYPOCHROMIA BLD QL SMEAR: ABNORMAL
IMM GRANULOCYTES # BLD AUTO: 0.03 K/UL (ref 0–0.04)
IMM GRANULOCYTES NFR BLD AUTO: 0.6 % (ref 0–0.5)
LYMPHOCYTES # BLD AUTO: 0.8 K/UL (ref 1–4.8)
LYMPHOCYTES NFR BLD: 17.6 % (ref 18–48)
MAGNESIUM SERPL-MCNC: 2.3 MG/DL (ref 1.6–2.6)
MCH RBC QN AUTO: 19.1 PG (ref 27–31)
MCHC RBC AUTO-ENTMCNC: 28.2 G/DL (ref 32–36)
MCV RBC AUTO: 68 FL (ref 82–98)
MONOCYTES # BLD AUTO: 0.6 K/UL (ref 0.3–1)
MONOCYTES NFR BLD: 12.1 % (ref 4–15)
NEUTROPHILS # BLD AUTO: 3.2 K/UL (ref 1.8–7.7)
NEUTROPHILS NFR BLD: 67.2 % (ref 38–73)
NRBC BLD-RTO: 10 /100 WBC
OVALOCYTES BLD QL SMEAR: ABNORMAL
PHOSPHATE SERPL-MCNC: 5.8 MG/DL (ref 2.7–4.5)
PLATELET # BLD AUTO: 150 K/UL (ref 150–450)
PLATELET BLD QL SMEAR: ABNORMAL
PMV BLD AUTO: ABNORMAL FL (ref 9.2–12.9)
POCT GLUCOSE: 93 MG/DL (ref 70–110)
POIKILOCYTOSIS BLD QL SMEAR: ABNORMAL
POLYCHROMASIA BLD QL SMEAR: ABNORMAL
POTASSIUM SERPL-SCNC: 4.3 MMOL/L (ref 3.5–5.1)
PROT SERPL-MCNC: 5.9 G/DL (ref 6–8.4)
RBC # BLD AUTO: 5.39 M/UL (ref 4–5.4)
SCHISTOCYTES BLD QL SMEAR: ABNORMAL
SCHISTOCYTES BLD QL SMEAR: PRESENT
SODIUM SERPL-SCNC: 135 MMOL/L (ref 136–145)
SPHEROCYTES BLD QL SMEAR: ABNORMAL
WBC # BLD AUTO: 4.72 K/UL (ref 3.9–12.7)

## 2024-08-08 PROCEDURE — 85025 COMPLETE CBC W/AUTO DIFF WBC: CPT

## 2024-08-08 PROCEDURE — 36415 COLL VENOUS BLD VENIPUNCTURE: CPT

## 2024-08-08 PROCEDURE — 63600175 PHARM REV CODE 636 W HCPCS

## 2024-08-08 PROCEDURE — 20600001 HC STEP DOWN PRIVATE ROOM

## 2024-08-08 PROCEDURE — 87040 BLOOD CULTURE FOR BACTERIA: CPT | Mod: 59 | Performed by: NURSE PRACTITIONER

## 2024-08-08 PROCEDURE — 25000003 PHARM REV CODE 250

## 2024-08-08 PROCEDURE — 94761 N-INVAS EAR/PLS OXIMETRY MLT: CPT

## 2024-08-08 PROCEDURE — 84100 ASSAY OF PHOSPHORUS: CPT

## 2024-08-08 PROCEDURE — 99900035 HC TECH TIME PER 15 MIN (STAT)

## 2024-08-08 PROCEDURE — 83735 ASSAY OF MAGNESIUM: CPT

## 2024-08-08 PROCEDURE — 80053 COMPREHEN METABOLIC PANEL: CPT

## 2024-08-08 PROCEDURE — 27000221 HC OXYGEN, UP TO 24 HOURS

## 2024-08-08 RX ADMIN — ATORVASTATIN CALCIUM 40 MG: 40 TABLET, FILM COATED ORAL at 08:08

## 2024-08-08 RX ADMIN — ONDANSETRON 4 MG: 2 INJECTION INTRAMUSCULAR; INTRAVENOUS at 03:08

## 2024-08-08 RX ADMIN — HYDRALAZINE HYDROCHLORIDE: 10 TABLET, FILM COATED ORAL at 09:08

## 2024-08-08 RX ADMIN — HYDRALAZINE HYDROCHLORIDE: 10 TABLET, FILM COATED ORAL at 08:08

## 2024-08-08 RX ADMIN — AMIODARONE HYDROCHLORIDE 200 MG: 200 TABLET ORAL at 09:08

## 2024-08-08 RX ADMIN — ALLOPURINOL 300 MG: 100 TABLET ORAL at 09:08

## 2024-08-08 RX ADMIN — FUROSEMIDE 80 MG: 10 INJECTION, SOLUTION INTRAVENOUS at 09:08

## 2024-08-08 RX ADMIN — PANTOPRAZOLE SODIUM 40 MG: 40 TABLET, DELAYED RELEASE ORAL at 09:08

## 2024-08-08 RX ADMIN — HYDRALAZINE HYDROCHLORIDE: 10 TABLET, FILM COATED ORAL at 02:08

## 2024-08-08 RX ADMIN — APIXABAN 2.5 MG: 2.5 TABLET, FILM COATED ORAL at 09:08

## 2024-08-08 RX ADMIN — APIXABAN 2.5 MG: 2.5 TABLET, FILM COATED ORAL at 08:08

## 2024-08-08 RX ADMIN — PREGABALIN 50 MG: 50 CAPSULE ORAL at 08:08

## 2024-08-08 RX ADMIN — METOLAZONE 5 MG: 5 TABLET ORAL at 09:08

## 2024-08-08 RX ADMIN — FUROSEMIDE 80 MG: 10 INJECTION, SOLUTION INTRAVENOUS at 08:08

## 2024-08-08 NOTE — ASSESSMENT & PLAN NOTE
End stage heart failure   Chronic combined systolic and diastolic heart failure    History of non-ischemic cardiomyopathy s/p AICD placement on palliative dobutamine and not a candidate for advanced therapies. Patient currently pursuing heart transplant options.      Last Echo with EF of 10-15%. CXR on this admission with stable cardiomegaly without evidence of new opacifications concerning for edema. WBC wnl. BNP 2951, Trop 1.867 > 1.776. EKG with sinus tach and PAC, w/o ischemic changes on personal review. Needing more supplemental Oxygen; has order for PRN 2L. COVID/Flu/RSV negative. ED gave lasix IV 40mg once.    She reports taking her diuretics (metolazone and bumex) as needed rather than daily as per her last discharge orders. High concern for medication non-adherence and health literacy.     Plan:  - continue home    - changed home bumex 2mg PO to IV lasix 80mg BID; titrate as tolerated for goal diuresis of 2-3L/day  - continue home metolazone  - strict I/O  - daily weights  - Low sodium caridac diet  - replenish lytes as needed K>4, Phos>3, Mg>2

## 2024-08-08 NOTE — NURSING
Pt had 9 beats of Vtach, no complaints on assessment. Dr Maurice informed, continue to monitor.     Also informed MD that PICC line was inserted since 5/13/2023 and as verbalzied by pt, dobutamine pump alarms frequently.  Flushed with some resistance noted.

## 2024-08-08 NOTE — CARE UPDATE
Unit [facility] Care Support Interaction      I have reviewed the chart of Hafsa Hawley who is hospitalized for NICM (nonischemic cardiomyopathy). The patient is currently located in the following unit: CSU        I have assisted the primary physician in management of the following:      Central Line - Present on admission to the unit - ordered blood cultures       Argenis Mcmillan NP  Unit Based [facility]

## 2024-08-08 NOTE — PROGRESS NOTES
[facility] - Cardiology [facility] Hospital Medicine  Progress Note    Patient Name: Hafsa Hawley  MRN: [id number]  Patient Class: IP- Inpatient   Admission Date: 8/6/2024  Length of Stay: 1 days  Attending Physician: Ehsan Vera MD  Primary Care Provider: Cristobal Ann MD        Subjective:     Principal Problem:NICM (nonischemic cardiomyopathy)        HPI:  Hafsa Hawley is 58 y.o. F with Mhx of NICM with HFrEF (10-15%) on home dobutamine, end stage heart failure, s/p AICD, presents now for worsening shortness of breath, waking up at night very short of breath, decreased exercise tolerance, decreased appetite, and generalized fatigue. Symptoms have been ongoing for the past 4 days and worsened last night into today. She reports wanting to stay full treatment as she hopes to make it to [place] and [place] for tranplant eval. Recent admission for similar presentation 07/24. Has home PRN oxygen. She reports using her diuretic regiment as PRNs. She expresses frustrations in the continuous changes in her medications which is the reason she is not complaint and unable to qualify for transplant at [facility]. Full ROS below. Lives with family. Ambulates independently. Is established with palliative care outpatient.      Overview/Hospital Course:  Ms. Hawley is a 58-y/o female w PMH CKD 4, DM, AF, DVT, PHTN, Gilbert's syndrome, chronic hypoxic and hypercapnic resp failure (home O2 2L), pheochromocytoma (borderline catacholamines in 2018 labs), NICM, SHF (EF 10-15%) on home dobutamine s/p AICD admitted on 8/7 with 4 day h/o increasing shortness a breath. Patient reports taking Bumex twice a day and metolazone as needed as directed by her primary care provider. Patient not listed for Heart XPL due to CKD 4, ch resp failure with c/f underlying lung disease and medical illiteracy (2018 Transplant [facility] note). Patient started on Lasix (80 mg IV b.i.d.) and continued on dobutamine gtt.  The plan is to continue IV  diuretics for a few days as long as tolerated by renal function and to discharge her on scheduled Lasix and metolazone.        Interval History:   NAEON. VSS on 2L NC. Patient says SOB has improved. Reports still feeling a little nauseous and coughing up phlegm. Reports mild abdominal pain. No fevers, chills, vomiting.     Review of Systems   Constitutional:  Positive for fatigue. Negative for chills and fever.   Respiratory:  Positive for shortness of breath. Negative for wheezing.    Cardiovascular:  Negative for chest pain and leg swelling.   Gastrointestinal:  Positive for abdominal distention, abdominal pain and nausea. Negative for diarrhea and vomiting.   Musculoskeletal:  Positive for myalgias.     Objective:     Vital Signs (Most Recent):  Temp: 98.5 °F (36.9 °C) (08/08/24 0716)  Pulse: 87 (08/08/24 0716)  Resp: 16 (08/08/24 0716)  BP: (!) 123/94 (08/08/24 0716)  SpO2: 95 % (08/08/24 0805) Vital Signs (24h Range):  Temp:  [97.2 °F (36.2 °C)-98.5 °F (36.9 °C)] 98.5 °F (36.9 °C)  Pulse:  [] 87  Resp:  [14-20] 16  SpO2:  [94 %-100 %] 95 %  BP: ()/(61-94) 123/94     Weight: 82.9 kg (182 lb 12.2 oz)  Body mass index is 29.5 kg/m².    Intake/Output Summary (Last 24 hours) at 8/8/2024 0826  Last data filed at 8/8/2024 0406  Gross per 24 hour   Intake --   Output 200 ml   Net -200 ml         Physical Exam  Cardiovascular:      Rate and Rhythm: Normal rate.      Pulses: Normal pulses.      Heart sounds: Normal heart sounds.   Pulmonary:      Breath sounds: No wheezing or rales.   Abdominal:      Tenderness: There is abdominal tenderness.   Skin:     General: Skin is warm.   Neurological:      General: No focal deficit present.      Mental Status: She is alert and oriented to person, place, and time.             Significant Labs: All pertinent labs within the past 24 hours have been reviewed.  CBC:   Recent Labs   Lab 08/06/24  2206 08/07/24  0446 08/08/24  0545   WBC 5.41 4.62 4.72   HGB 10.9* 10.6*  10.3*   HCT 39.3 38.0 36.5*    157 150       Significant Imaging: I have reviewed all pertinent imaging results/findings within the past 24 hours.    Assessment/Plan:      * NICM (nonischemic cardiomyopathy)  End stage heart failure   Chronic combined systolic and diastolic heart failure    History of non-ischemic cardiomyopathy s/p AICD placement on palliative dobutamine and not a candidate for advanced therapies. Patient currently pursuing heart transplant options.      Last Echo with EF of 10-15%. CXR on this admission with stable cardiomegaly without evidence of new opacifications concerning for edema. WBC wnl. BNP 2951, Trop 1.867 > 1.776. EKG with sinus tach and PAC, w/o ischemic changes on personal review. Needing more supplemental Oxygen; has order for PRN 2L. COVID/Flu/RSV negative. ED gave lasix IV 40mg once.    She reports taking her diuretics (metolazone and bumex) as needed rather than daily as per her last discharge orders. High concern for medication non-adherence and health literacy.     Plan:  - continue home    - changed home bumex 2mg PO to IV lasix 80mg BID; titrate as tolerated for goal diuresis of 2-3L/day  - continue home metolazone  - strict I/O  - daily weights  - Low sodium caridac diet  - replenish lytes as needed K>4, Phos>3, Mg>2        End stage heart failure  See NICM for further information      Hyperlipidemia, mixed  - continue home statin      Anemia of chronic disease  Anemia is likely due to chronic disease due to Chronic Kidney Disease. Most recent hemoglobin and hematocrit are listed below.  Recent Labs     08/06/24  2206 08/07/24  0446   HGB 10.9* 10.6*   HCT 39.3 38.0     Plan  - Monitor serial CBC: Daily  - Transfuse PRBC if patient becomes hemodynamically unstable, symptomatic or H/H drops below 7/21.      CKD (chronic kidney disease) stage 4, GFR 15-29 ml/min  Creatine stable for now. BMP reviewed- noted Estimated Creatinine Clearance: 18 mL/min (A) (based on SCr  of 3.6 mg/dL (H)). according to latest data. Based on current GFR, CKD stage is stage 4 - GFR 15-29.  Monitor UOP and serial BMP and adjust therapy as needed. Renally dose meds. Avoid nephrotoxic medications and procedures.    Paroxysmal A-fib  Chronic. Stable.     Plan:  - continue home eliquis  - continue home amio  - tele    Gout, arthritis  - continue home allopurinol      Pulmonary HTN  Known. WCTM.      Acute hypoxic respiratory failure  See NICM for more information. Other Ddx include workup for PE. Patient on Eliquis 2.5mg BID for afib, so less likely. Well's Score of PE was 0, so opted against further eval.     Abdominal pain  elevated bilirubin d/t Gilbert's syndrome    Diffuse pain to palpation. Pt says chronic however tenderness on minimal palpation. Tbili 2.6. LFTs wnl.     Plan:  - f/u CT a/p noncontrast       Essential hypertension  - continue home BP medications as tolerated    Elevated troponin  Chronic trop elevation. Trop 1.867 > 1.776. EKG with sinus tach and PAC, w/o ischemic changes on personal review. Will continue to monitor.       VTE Risk Mitigation (From admission, onward)           Ordered     apixaban tablet 2.5 mg  2 times daily         08/07/24 0404     IP VTE HIGH RISK PATIENT  Once         08/07/24 0333     Place sequential compression device  Until discontinued         08/07/24 0333                    Discharge Planning   CHIQUI:      Code Status: Full Code   Is the patient medically ready for discharge?:     Reason for patient still in hospital (select all that apply): Treatment             Guera Valdes MD  Department of Hospital Medicine   [facility] - Cardiology Stepdown

## 2024-08-08 NOTE — SUBJECTIVE & OBJECTIVE
Interval History:   NAEON. VSS on 2L NC. Patient says SOB has improved. Reports still feeling a little nauseous and coughing up phlegm. Reports mild abdominal pain. No fevers, chills, vomiting.     Review of Systems   Constitutional:  Positive for fatigue. Negative for chills and fever.   Respiratory:  Positive for shortness of breath. Negative for wheezing.    Cardiovascular:  Negative for chest pain and leg swelling.   Gastrointestinal:  Positive for abdominal distention, abdominal pain and nausea. Negative for diarrhea and vomiting.   Musculoskeletal:  Positive for myalgias.     Objective:     Vital Signs (Most Recent):  Temp: 98.5 °F (36.9 °C) (08/08/24 0716)  Pulse: 87 (08/08/24 0716)  Resp: 16 (08/08/24 0716)  BP: (!) 123/94 (08/08/24 0716)  SpO2: 95 % (08/08/24 0805) Vital Signs (24h Range):  Temp:  [97.2 °F (36.2 °C)-98.5 °F (36.9 °C)] 98.5 °F (36.9 °C)  Pulse:  [] 87  Resp:  [14-20] 16  SpO2:  [94 %-100 %] 95 %  BP: ()/(61-94) 123/94     Weight: 82.9 kg (182 lb 12.2 oz)  Body mass index is 29.5 kg/m².    Intake/Output Summary (Last 24 hours) at 8/8/2024 0826  Last data filed at 8/8/2024 0406  Gross per 24 hour   Intake --   Output 200 ml   Net -200 ml         Physical Exam  Cardiovascular:      Rate and Rhythm: Normal rate.      Pulses: Normal pulses.      Heart sounds: Normal heart sounds.   Pulmonary:      Breath sounds: No wheezing or rales.   Abdominal:      Tenderness: There is abdominal tenderness.   Skin:     General: Skin is warm.   Neurological:      General: No focal deficit present.      Mental Status: She is alert and oriented to person, place, and time.             Significant Labs: All pertinent labs within the past 24 hours have been reviewed.  CBC:   Recent Labs   Lab 08/06/24  2206 08/07/24  0446 08/08/24  0545   WBC 5.41 4.62 4.72   HGB 10.9* 10.6* 10.3*   HCT 39.3 38.0 36.5*    157 150       Significant Imaging: I have reviewed all pertinent imaging results/findings  within the past 24 hours.

## 2024-08-08 NOTE — PLAN OF CARE
Problem: Adult Inpatient Plan of Care  Goal: Plan of Care Review  Outcome: Progressing  Goal: Patient-Specific Goal (Individualized)  Outcome: Progressing  Goal: Absence of Hospital-Acquired Illness or Injury  Outcome: Progressing  Goal: Optimal Comfort and Wellbeing  Outcome: Progressing  Goal: Readiness for Transition of Care  Outcome: Progressing     Problem: Infection  Goal: Absence of Infection Signs and Symptoms  Outcome: Progressing     Problem: Infection  Goal: Absence of Infection Signs and Symptoms  Outcome: Progressing     Problem: Diabetes Comorbidity  Goal: Blood Glucose Level Within Targeted Range  Outcome: Progressing     Problem: Fall Injury Risk  Goal: Absence of Fall and Fall-Related Injury  Outcome: Progressing

## 2024-08-08 NOTE — HOSPITAL COURSE
Ms. Hawley is a 58-y/o female w PMH CKD 4, DM, AF, DVT, PHTN, Gilbert's syndrome, chronic hypoxic and hypercapnic resp failure (home O2 2L), pheochromocytoma (borderline catacholamines in 2018 labs), NICM, SHF (EF 10-15%) on home dobutamine s/p AICD admitted on 8/7 with 4 day h/o increasing shortness a breath. Patient reports taking Bumex twice a day and metolazone as needed as directed by her primary care provider. Patient not listed for Heart XPL due to CKD 4, ch resp failure with c/f underlying lung disease and medical illiteracy (2018 [facility] note). Patient started on Lasix (80 mg IV b.i.d.) and continued on dobutamine gtt.  Patient diuresed well on IV lasix and transitioned to Home Bumex. Patient was discharged with follow up to transitional heart failure clinic and PCP. Should be on 2mg Bumex BID and metolazone as needed for weight gain with routine monitoring of kidney function.           The mobility limitation cannot be sufficiently resolved by the use of a cane. Patient's functional mobility deficit can be sufficiently resolved with the use of a (Rolling Walker or Walker). Patient's mobility limitation significantly impairs their ability to participate in one of more activities of daily living. The use of a (RW or Walker) will significantly improve the patient's ability to participate in MRADLS and the patient will use it on regular basis in the home.

## 2024-08-08 NOTE — NURSING
At 0149, noted that pt had 6 runs of ventricular tachycardia. Pt ws asymptomatic on assessment. Rechecked BP after lasix, 100/66(77). Dr Barak Maurice informed, instructed to continue monitoring.

## 2024-08-08 NOTE — PLAN OF CARE
Problem: Adult Inpatient Plan of Care  Goal: Plan of Care Review  8/8/2024 0541 by Argenis Cr RN  Outcome: Progressing  8/8/2024 0141 by Argenis Cr RN  Outcome: Progressing  Goal: Patient-Specific Goal (Individualized)  8/8/2024 0541 by Argenis Cr RN  Outcome: Progressing  8/8/2024 0141 by Argenis Cr RN  Reactivated  Goal: Absence of Hospital-Acquired Illness or Injury  8/8/2024 0541 by Argenis Cr RN  Outcome: Progressing  8/8/2024 0141 by Argenis Cr RN  Reactivated  Goal: Optimal Comfort and Wellbeing  8/8/2024 0541 by Argenis Cr RN  Outcome: Progressing  8/8/2024 0141 by Argenis Cr RN  Reactivated  Goal: Readiness for Transition of Care  8/8/2024 0541 by Argenis Cr RN  Outcome: Progressing  8/8/2024 0141 by Argenis Cr RN  Reactivated     Problem: Infection  Goal: Absence of Infection Signs and Symptoms  8/8/2024 0541 by Argenis Cr RN  Outcome: Progressing  8/8/2024 0141 by Argenis Cr RN  Reactivated     Problem: Diabetes Comorbidity  Goal: Blood Glucose Level Within Targeted Range  8/8/2024 0541 by Argenis Cr RN  Outcome: Progressing  8/8/2024 0141 by Argenis Cr RN  Reactivated     Problem: Fall Injury Risk  Goal: Absence of Fall and Fall-Related Injury  8/8/2024 0541 by Argenis Cr RN  Outcome: Progressing  8/8/2024 0141 by Argenis Cr RN  Reactivated

## 2024-08-08 NOTE — PLAN OF CARE
[facility]iology Stepdown  Initial Discharge Assessment       Primary Care Provider: Cristobal Ann MD    Admission Diagnosis: Shortness of breath [R06.02]  Chest pain [R07.9]    Admission Date: 8/6/2024  Expected Discharge Date: 8/12/2024    Transition of Care Barriers: None    Payor: MEDICAID / Plan: [employer] [facility] ([employer]) / Product Type: Managed Medicaid /     Extended Emergency Contact Information  Primary Emergency Contact: Erika Estrada  Mobile Phone: [phone]  Relation: Healthcare Power of   Secondary Emergency Contact: Jaye Baum  Address: [address] [address] [facility]  Home Phone: [phone]  Relation: Sister    Discharge Plan A: Home  Discharge Plan B: Home with family      [facility]  [address] [place] [address]  Phone: [phone] Fax: [phone]    [facility]  [address]  [facility] [address]  Phone: [phone] Fax: [phone]      Initial Assessment (most recent)       Adult Discharge Assessment - 08/08/24 0956          Discharge Assessment    Assessment Type Discharge Planning Assessment     Confirmed/corrected address, phone number and insurance Yes     Confirmed Demographics Correct on Facesheet     Source of Information patient     Does patient/caregiver understand observation status Yes     Communicated CHIQUI with patient/caregiver Date not available/Unable to determine     Reason For Admission NICM (nonischemic cardiomyopathy)     People in Home significant other     Do you expect to return to your current living situation? Yes     Do you have help at home or someone to help you manage your care at home? Yes     Who are your caregiver(s) and their phone number(s)? Jerrod VALLES [phone]     Prior to hospitilization cognitive status: Alert/Oriented     Current cognitive status: Alert/Oriented     Walking or Climbing Stairs Difficulty yes     Walking or Climbing Stairs  ambulation difficulty, requires equipment     Mobility Management straight cane     Dressing/Bathing Difficulty no     Equipment Currently Used at Home cane, straight;oxygen     Readmission within 30 days? No     Patient currently being followed by outpatient case management? No     Do you currently have service(s) that help you manage your care at home? No     Do you take prescription medications? Yes     Do you have prescription coverage? Yes     Coverage Payor: MEDICAID - [facility] ([facility]) -     Do you have any problems affording any of your prescribed medications? No     Is the patient taking medications as prescribed? yes     How do you get to doctors appointments? car, drives self;family or friend will provide     Are you on dialysis? No     Do you take coumadin? No     Discharge Plan A Home     Discharge Plan B Home with family     DME Needed Upon Discharge  other (see comments)   TBD    Discharge Plan discussed with: Patient     Transition of Care Barriers None        Physical Activity    On average, how many days per week do you engage in moderate to strenuous exercise (like a brisk walk)? 0 days     On average, how many minutes do you engage in exercise at this level? 0 min        Financial Resource Strain    How hard is it for you to pay for the very basics like food, housing, medical care, and heating? Not hard at all        Housing Stability    In the last 12 months, was there a time when you were not able to pay the mortgage or rent on time? No     At any time in the past 12 months, were you homeless or living in a shelter (including now)? No        Transportation Needs    Has the lack of transportation kept you from medical appointments, meetings, work or from getting things needed for daily living? No        Food Insecurity    Within the past 12 months, you worried that your food would run out before you got the money to buy more. Never true     Within the past 12 months, the  food you bought just didn't last and you didn't have money to get more. Never true        Stress    Do you feel stress - tense, restless, nervous, or anxious, or unable to sleep at night because your mind is troubled all the time - these days? Rather much        Social Isolation    How often do you feel lonely or isolated from those around you?  Never        Alcohol Use    Q1: How often do you have a drink containing alcohol? Never     Q2: How many drinks containing alcohol do you have on a typical day when you are drinking? Patient does not drink     Q3: How often do you have six or more drinks on one occasion? Never        Utilities    In the past 12 months has the electric, gas, oil, or water company threatened to shut off services in your home? No        Health Literacy    How often do you need to have someone help you when you read instructions, pamphlets, or other written material from your doctor or pharmacy? Never                   Spoke to pt. Pt lives at home with her significant other Jerrod. Post hospital  stay SO and sister will be pt support person and pt. has transportation at d/c with family. There have been no hospitalizations within the last 30 days per pt. Verified pt PCP and preferred pharmacy. Pt stated not on Coumadin and is not receiving dialysis. All questions answered regarding case management/ discharge planning , pt verbalized understanding. Discharge booklet with SW contact information given to pt.     Discharge Plan A and Plan B have been determined by review of patient's clinical status, future medical and therapeutic needs, and coverage/benefits for post-acute care in coordination with multidisciplinary team members.        SETH Mckeon  [facility]  [phone]

## 2024-08-09 LAB
ALBUMIN SERPL BCP-MCNC: 3.4 G/DL (ref 3.5–5.2)
ALP SERPL-CCNC: 47 U/L (ref 55–135)
ALT SERPL W/O P-5'-P-CCNC: 15 U/L (ref 10–44)
ANION GAP SERPL CALC-SCNC: 15 MMOL/L (ref 8–16)
ANISOCYTOSIS BLD QL SMEAR: ABNORMAL
AST SERPL-CCNC: 14 U/L (ref 10–40)
BASOPHILS # BLD AUTO: 0.02 K/UL (ref 0–0.2)
BASOPHILS NFR BLD: 0.4 % (ref 0–1.9)
BILIRUB SERPL-MCNC: 2.1 MG/DL (ref 0.1–1)
BUN SERPL-MCNC: 108 MG/DL (ref 6–20)
CALCIUM SERPL-MCNC: 9.3 MG/DL (ref 8.7–10.5)
CHLORIDE SERPL-SCNC: 97 MMOL/L (ref 95–110)
CO2 SERPL-SCNC: 28 MMOL/L (ref 23–29)
CREAT SERPL-MCNC: 4 MG/DL (ref 0.5–1.4)
DACRYOCYTES BLD QL SMEAR: ABNORMAL
DIFFERENTIAL METHOD BLD: ABNORMAL
EOSINOPHIL # BLD AUTO: 0.2 K/UL (ref 0–0.5)
EOSINOPHIL NFR BLD: 3.1 % (ref 0–8)
ERYTHROCYTE [DISTWIDTH] IN BLOOD BY AUTOMATED COUNT: 31.7 % (ref 11.5–14.5)
EST. GFR  (NO RACE VARIABLE): 12.4 ML/MIN/1.73 M^2
GLUCOSE SERPL-MCNC: 117 MG/DL (ref 70–110)
HCT VFR BLD AUTO: 33.9 % (ref 37–48.5)
HGB BLD-MCNC: 10 G/DL (ref 12–16)
HYPOCHROMIA BLD QL SMEAR: ABNORMAL
IMM GRANULOCYTES # BLD AUTO: 0.04 K/UL (ref 0–0.04)
IMM GRANULOCYTES NFR BLD AUTO: 0.8 % (ref 0–0.5)
LYMPHOCYTES # BLD AUTO: 0.8 K/UL (ref 1–4.8)
LYMPHOCYTES NFR BLD: 15.9 % (ref 18–48)
MAGNESIUM SERPL-MCNC: 2.2 MG/DL (ref 1.6–2.6)
MCH RBC QN AUTO: 19.1 PG (ref 27–31)
MCHC RBC AUTO-ENTMCNC: 29.5 G/DL (ref 32–36)
MCV RBC AUTO: 65 FL (ref 82–98)
MONOCYTES # BLD AUTO: 0.6 K/UL (ref 0.3–1)
MONOCYTES NFR BLD: 11.4 % (ref 4–15)
NEUTROPHILS # BLD AUTO: 3.3 K/UL (ref 1.8–7.7)
NEUTROPHILS NFR BLD: 68.4 % (ref 38–73)
NRBC BLD-RTO: 5 /100 WBC
OHS CV AF BURDEN PERCENT: < 1
OHS CV DC REMOTE DEVICE TYPE: NORMAL
OHS CV ICD SHOCK: NO
OHS CV RV PACING PERCENT: 0 %
OVALOCYTES BLD QL SMEAR: ABNORMAL
PHOSPHATE SERPL-MCNC: 5.6 MG/DL (ref 2.7–4.5)
PLATELET # BLD AUTO: 144 K/UL (ref 150–450)
PLATELET BLD QL SMEAR: ABNORMAL
PMV BLD AUTO: ABNORMAL FL (ref 9.2–12.9)
POIKILOCYTOSIS BLD QL SMEAR: ABNORMAL
POLYCHROMASIA BLD QL SMEAR: ABNORMAL
POTASSIUM SERPL-SCNC: 3.3 MMOL/L (ref 3.5–5.1)
PROT SERPL-MCNC: 5.9 G/DL (ref 6–8.4)
RBC # BLD AUTO: 5.23 M/UL (ref 4–5.4)
SCHISTOCYTES BLD QL SMEAR: ABNORMAL
SODIUM SERPL-SCNC: 140 MMOL/L (ref 136–145)
SPHEROCYTES BLD QL SMEAR: ABNORMAL
TARGETS BLD QL SMEAR: ABNORMAL
WBC # BLD AUTO: 4.84 K/UL (ref 3.9–12.7)

## 2024-08-09 PROCEDURE — 85025 COMPLETE CBC W/AUTO DIFF WBC: CPT

## 2024-08-09 PROCEDURE — 63600175 PHARM REV CODE 636 W HCPCS

## 2024-08-09 PROCEDURE — 97530 THERAPEUTIC ACTIVITIES: CPT

## 2024-08-09 PROCEDURE — 97165 OT EVAL LOW COMPLEX 30 MIN: CPT

## 2024-08-09 PROCEDURE — 63600175 PHARM REV CODE 636 W HCPCS: Performed by: STUDENT IN AN ORGANIZED HEALTH CARE EDUCATION/TRAINING PROGRAM

## 2024-08-09 PROCEDURE — 80053 COMPREHEN METABOLIC PANEL: CPT

## 2024-08-09 PROCEDURE — 25000003 PHARM REV CODE 250

## 2024-08-09 PROCEDURE — 84100 ASSAY OF PHOSPHORUS: CPT

## 2024-08-09 PROCEDURE — 20600001 HC STEP DOWN PRIVATE ROOM

## 2024-08-09 PROCEDURE — 83735 ASSAY OF MAGNESIUM: CPT

## 2024-08-09 PROCEDURE — 36415 COLL VENOUS BLD VENIPUNCTURE: CPT

## 2024-08-09 RX ORDER — POTASSIUM CHLORIDE 20 MEQ/1
40 TABLET, EXTENDED RELEASE ORAL
Status: COMPLETED | OUTPATIENT
Start: 2024-08-09 | End: 2024-08-09

## 2024-08-09 RX ADMIN — HYDRALAZINE HYDROCHLORIDE: 10 TABLET, FILM COATED ORAL at 08:08

## 2024-08-09 RX ADMIN — DOBUTAMINE HYDROCHLORIDE 5 MCG/KG/MIN: 400 INJECTION INTRAVENOUS at 03:08

## 2024-08-09 RX ADMIN — POTASSIUM CHLORIDE 40 MEQ: 1500 TABLET, EXTENDED RELEASE ORAL at 09:08

## 2024-08-09 RX ADMIN — FUROSEMIDE 80 MG: 10 INJECTION, SOLUTION INTRAVENOUS at 09:08

## 2024-08-09 RX ADMIN — AMIODARONE HYDROCHLORIDE 200 MG: 200 TABLET ORAL at 08:08

## 2024-08-09 RX ADMIN — PANTOPRAZOLE SODIUM 40 MG: 40 TABLET, DELAYED RELEASE ORAL at 08:08

## 2024-08-09 RX ADMIN — PREGABALIN 50 MG: 50 CAPSULE ORAL at 09:08

## 2024-08-09 RX ADMIN — POTASSIUM CHLORIDE 40 MEQ: 1500 TABLET, EXTENDED RELEASE ORAL at 12:08

## 2024-08-09 RX ADMIN — HYDRALAZINE HYDROCHLORIDE: 10 TABLET, FILM COATED ORAL at 09:08

## 2024-08-09 RX ADMIN — BENZONATATE 100 MG: 100 CAPSULE ORAL at 09:08

## 2024-08-09 RX ADMIN — APIXABAN 2.5 MG: 2.5 TABLET, FILM COATED ORAL at 08:08

## 2024-08-09 RX ADMIN — ONDANSETRON 4 MG: 2 INJECTION INTRAMUSCULAR; INTRAVENOUS at 09:08

## 2024-08-09 RX ADMIN — METOLAZONE 5 MG: 5 TABLET ORAL at 08:08

## 2024-08-09 RX ADMIN — ALLOPURINOL 300 MG: 100 TABLET ORAL at 08:08

## 2024-08-09 RX ADMIN — APIXABAN 2.5 MG: 2.5 TABLET, FILM COATED ORAL at 09:08

## 2024-08-09 RX ADMIN — ATORVASTATIN CALCIUM 40 MG: 40 TABLET, FILM COATED ORAL at 09:08

## 2024-08-09 RX ADMIN — FUROSEMIDE 80 MG: 10 INJECTION, SOLUTION INTRAVENOUS at 08:08

## 2024-08-09 RX ADMIN — HYDRALAZINE HYDROCHLORIDE: 10 TABLET, FILM COATED ORAL at 04:08

## 2024-08-09 NOTE — ASSESSMENT & PLAN NOTE
End stage heart failure   Chronic combined systolic and diastolic heart failure    History of non-ischemic cardiomyopathy s/p AICD placement on palliative dobutamine and not a candidate for advanced therapies. Patient currently pursuing heart transplant options.      Last Echo with EF of 10-15%. CXR on this admission with stable cardiomegaly without evidence of new opacifications concerning for edema. WBC wnl. BNP 2951, Trop 1.867 > 1.776. EKG with sinus tach and PAC, w/o ischemic changes on personal review. Needing more supplemental Oxygen; has order for PRN 2L. COVID/Flu/RSV negative. ED gave lasix IV 40mg once.    She reports taking her diuretics (metolazone and bumex) as needed rather than daily as per her last discharge orders. High concern for medication non-adherence and health literacy.     UOP 3L today. Net negative 2.7 L    Plan:  - continue lasix 80 BID  - continue home     --per pt, PICC line has been in since 05/2023. Will reach out to PICC team to see if exchange is necessary.   - continue home metolazone  - strict I/O  - daily weights  - Low sodium caridac diet  - replenish lytes as needed K>4, Phos>3, Mg>2

## 2024-08-09 NOTE — NURSING
Plan of care reviewed with patient. Patient is AOX4 and VS stable. Patient remained free of falls and trauma, fall precautions are in place. Patient is ambulating with standby assist.  Patient has no questions at this time. Wheels are locked and the bed is in lowest position. The call bell is within reach. Telemetry is on.    Patient is on  gtt at 5 mcg/kg/min.

## 2024-08-09 NOTE — PROGRESS NOTES
[facility] Cardiology [facility] Medicine  Progress Note    Patient Name: Hafsa Hawley  MRN: [id number]  Patient Class: IP- Inpatient   Admission Date: 8/6/2024  Length of Stay: 2 days  Attending Physician: Ehsan Vera MD  Primary Care Provider: Cristobal Ann MD        Subjective:     Principal Problem:NICM (nonischemic cardiomyopathy)        HPI:  Hafsa Hawley is 58 y.o. F with Mhx of NICM with HFrEF (10-15%) on home dobutamine, end stage heart failure, s/p AICD, presents now for worsening shortness of breath, waking up at night very short of breath, decreased exercise tolerance, decreased appetite, and generalized fatigue. Symptoms have been ongoing for the past 4 days and worsened last night into today. She reports wanting to stay full treatment as she hopes to make it to [place] and [place] for tranplant eval. Recent admission for similar presentation 07/24. Has home PRN oxygen. She reports using her diuretic regiment as PRNs. She expresses frustrations in the continuous changes in her medications which is the reason she is not complaint and unable to qualify for transplant at [facility]. Full ROS below. Lives with family. Ambulates independently. Is established with palliative care outpatient.      Overview/Hospital Course:  Ms. Hawley is a 58-y/o female w PMH CKD 4, DM, AF, DVT, PHTN, Gilbert's syndrome, chronic hypoxic and hypercapnic resp failure (home O2 2L), pheochromocytoma (borderline catacholamines in 2018 labs), NICM, SHF (EF 10-15%) on home dobutamine s/p AICD admitted on 8/7 with 4 day h/o increasing shortness a breath. Patient reports taking Bumex twice a day and metolazone as needed as directed by her primary care provider. Patient not listed for Heart XPL due to CKD 4, ch resp failure with c/f underlying lung disease and medical illiteracy (2018 [facility] note). Patient started on Lasix (80 mg IV b.i.d.) and continued on dobutamine gtt.  The plan is to continue IV  diuretics for a few days as long as tolerated by renal function and to discharge her on scheduled Lasix and metolazone.        Interval History:   NAEON. VSS on 2L NC. Patient says SOB has improved. Regular BM's, appetite improved. No fevers, chills, vomiting.     Review of Systems   Constitutional:  Positive for fatigue. Negative for chills and fever.   Respiratory:  Positive for shortness of breath. Negative for wheezing.    Cardiovascular:  Negative for chest pain and leg swelling.   Gastrointestinal:  Positive for abdominal distention, abdominal pain and nausea. Negative for diarrhea and vomiting.   Musculoskeletal:  Positive for myalgias.     Objective:     Vital Signs (Most Recent):  Temp: 97.6 °F (36.4 °C) (08/09/24 0431)  Pulse: 85 (08/09/24 0431)  Resp: 16 (08/09/24 0431)  BP: 115/75 (08/09/24 0431)  SpO2: 98 % (08/09/24 0431) Vital Signs (24h Range):  Temp:  [97.6 °F (36.4 °C)-98.1 °F (36.7 °C)] 97.6 °F (36.4 °C)  Pulse:  [] 85  Resp:  [16-20] 16  SpO2:  [95 %-98 %] 98 %  BP: (106-145)/(55-75) 115/75     Weight: 82.2 kg (181 lb 3.5 oz)  Body mass index is 29.25 kg/m².    Intake/Output Summary (Last 24 hours) at 8/9/2024 0751  Last data filed at 8/9/2024 0636  Gross per 24 hour   Intake 762 ml   Output 3050 ml   Net -2288 ml         Physical Exam  Cardiovascular:      Rate and Rhythm: Normal rate.      Pulses: Normal pulses.      Heart sounds: Normal heart sounds.   Pulmonary:      Breath sounds: No wheezing or rales.   Abdominal:      Tenderness: There is abdominal tenderness.   Skin:     General: Skin is warm.   Neurological:      General: No focal deficit present.      Mental Status: She is alert and oriented to person, place, and time.             Significant Labs: All pertinent labs within the past 24 hours have been reviewed.  CBC:   Recent Labs   Lab 08/08/24  0545 08/09/24  0621   WBC 4.72 4.84   HGB 10.3* 10.0*   HCT 36.5* 33.9*    144*       Significant Imaging: I have reviewed all  pertinent imaging results/findings within the past 24 hours.    Assessment/Plan:      * NICM (nonischemic cardiomyopathy)  End stage heart failure   Chronic combined systolic and diastolic heart failure    History of non-ischemic cardiomyopathy s/p AICD placement on palliative dobutamine and not a candidate for advanced therapies. Patient currently pursuing heart transplant options.      Last Echo with EF of 10-15%. CXR on this admission with stable cardiomegaly without evidence of new opacifications concerning for edema. WBC wnl. BNP 2951, Trop 1.867 > 1.776. EKG with sinus tach and PAC, w/o ischemic changes on personal review. Needing more supplemental Oxygen; has order for PRN 2L. COVID/Flu/RSV negative. ED gave lasix IV 40mg once.    She reports taking her diuretics (metolazone and bumex) as needed rather than daily as per her last discharge orders. High concern for medication non-adherence and health literacy.     UOP 3L today. Net negative 2.7 L    Plan:  - continue lasix 80 BID  - continue home     --per pt, PICC line has been in since 05/2023. Will reach out to PICC team to see if exchange is necessary.   - continue home metolazone  - strict I/O  - daily weights  - Low sodium caridac diet  - replenish lytes as needed K>4, Phos>3, Mg>2        End stage heart failure  See NICM for further information      Hyperlipidemia, mixed  - continue home statin      Anemia of chronic disease  Anemia is likely due to chronic disease due to Chronic Kidney Disease. Most recent hemoglobin and hematocrit are listed below.  Recent Labs     08/06/24  2206 08/07/24  0446   HGB 10.9* 10.6*   HCT 39.3 38.0     Plan  - Monitor serial CBC: Daily  - Transfuse PRBC if patient becomes hemodynamically unstable, symptomatic or H/H drops below 7/21.      CKD (chronic kidney disease) stage 4, GFR 15-29 ml/min  Creatine stable for now. BMP reviewed- noted Estimated Creatinine Clearance: 16.6 mL/min (A) (based on SCr of 4 mg/dL (H)).  according to latest data. Based on current GFR, CKD stage is stage 4 - GFR 15-29.  Monitor UOP and serial BMP and adjust therapy as needed. Renally dose meds. Avoid nephrotoxic medications and procedures.    Paroxysmal A-fib  Chronic. Stable.     Plan:  - continue home eliquis  - continue home amio  - tele    Gout, arthritis  - continue home allopurinol      Pulmonary HTN  Known. WCTM.      Acute hypoxic respiratory failure  On home O2.  SpO2 98% 2L NC. About at baseline    Abdominal pain  elevated bilirubin d/t Gilbert's syndrome    Diffuse pain to palpation. Pt says chronic however tenderness on minimal palpation. Tbili 2.6. LFTs wnl.     Plan:  - f/u CT a/p noncontrast       Essential hypertension  - continue home BP medications as tolerated    Elevated troponin  Chronic trop elevation. Trop 1.867 > 1.776. EKG with sinus tach and PAC, w/o ischemic changes on personal review. Will continue to monitor.       VTE Risk Mitigation (From admission, onward)           Ordered     apixaban tablet 2.5 mg  2 times daily         08/07/24 0404     IP VTE HIGH RISK PATIENT  Once         08/07/24 0333     Place sequential compression device  Until discontinued         08/07/24 0333                    Discharge Planning   CHIQUI: 8/12/2024     Code Status: Full Code   Is the patient medically ready for discharge?:     Reason for patient still in hospital (select all that apply): Treatment  Discharge Plan A: Home          Guera Valdes MD  Department of Hospital Medicine   [facility] - Cardiology Stepdown

## 2024-08-09 NOTE — MEDICAL/APP STUDENT
Hospital Medicine  Progress note    Team: [facility] HOSP MED 1 Lisa Navarro  Admit Date: 8/6/2024  CHIQUI 8/12/2024  Principal Problem:  NICM (nonischemic cardiomyopathy)    HPI:   58-y/o w PMH CKD 4, DM, AF, DVT, PHTN, Gilbert's syndrome, chronic hypoxic and hypercapnic resp failure (home O2 2L), pheochromocytoma (borderline catacholamines in 2018 labs), NICM, SHF (EF 10-15%) on home dobutamine s/p AICD admitted on 8/7 with 4 day h/o increasing shortness a breath.  Patient reports taking Bumex twice a day and metolazone as needed as directed by her primary care provider. Patient not listed for Heart XPL due to CKD 4, ch resp failure with c/f underlying lung disease and medical illiteracy (2018 Transplant [place] note)     In ED  > 97, RR 24 > 16, SpO2 99% (3 L) Labs: . Hb 10.9, Na 139, K 4.9, CO2 26, Cr 3.3 (BL), T bili 2.6 (BL), BNP 2951 (BL trending up over past 6 months), trop 1867 (BL).     Hospital Course:  The patient was admitted to [facility] on the morning of 8/7 with SOB and cough due to HFrEF. She has previously been admitted to [facility] with CHF on 7/24 with a similar presentation. She is currently on a dobutamine infusion and metazolone as needed and was placed on lasix on arrival to the ED. Through her admission she has continued to have Sob at times and coughs up phlegm. She desires to be on a transplant list but was denied from the [facility] transplant list due to her medical history and medication noncompliance. She reports wanting to stay full treatment as she hopes to make it to [place] and [place] for tranplant evaluation. She has had episodes of asymptomatic ventricular tachycardia throughout her admission and has been treated with amiodarone for her episodes.    Interval hx:  NAEON. The patient continues to cough up phlegm, but says this is normal for her. She denies any fever or chills. Blood cultures continue to show no growth.    Vital Signs Range (Last 24H):  Temp:  [97.6 °F (36.4 °C)-98.1 °F (36.7 °C)]    Pulse:  []   Resp:  [16-20]   BP: (106-145)/(55-75)   SpO2:  [95 %-98 %] Body mass index is 29.25 kg/m².    I/O last 3 completed shifts:  In: 880 [P.O.:880]  Out: 3250 [Urine:3250]  Net IO Since Admission: -2,370 mL [08/09/24 0751]    Physical Exam:  General: well nourished, fatigued, no distress, toxic  Lungs:  clear to auscultation bilaterally and normal respiratory effort  Cardiovascular: Heart: regular rate and rhythm, S1, S2 normal, no murmur, click, rub or gallop. Chest Wall: not examined. Extremities: no cyanosis or edema, or clubbing. Pulses: 2+ and symmetric.  Abdomen/Rectal: Abdomen: soft, non-tender non-distented; bowel sounds normal; no masses,  no organomegaly. Rectal: not examined     Laboratory  Recent Labs   Lab 08/07/24  0446 08/08/24  0545 08/09/24  0621   WBC 4.62 4.72 4.84   HGB 10.6* 10.3* 10.0*   HCT 38.0 36.5* 33.9*    150 144*     Recent Labs   Lab 08/09/24  0621   *      K 3.3*   CL 97   CO2 28   *   CREATININE 4.0*   CALCIUM 9.3   MG 2.2     Recent Labs   Lab 08/07/24  0445 08/08/24  0545 08/09/24  0621   ALKPHOS 51* 49* 47*   ALT 17 15 15   AST 36 17 14   ALBUMIN 3.4* 3.4* 3.4*   PROT 6.4 5.9* 5.9*   BILITOT 2.7* 2.1* 2.1*       Significant Labs: All pertinent labs within the past 24 hours have been reviewed.     Significant Imaging: I have reviewed all pertinent imaging results/findings within the past 24 hours.      Current Problems List:  Active Hospital Problems    Diagnosis  POA    *NICM (nonischemic cardiomyopathy) [I42.8]  Yes     Chronic    End stage heart failure [I50.84]  Yes     Chronic    Hyperlipidemia, mixed [E78.2]  Yes     Chronic    Anemia of chronic disease [D63.8]  Yes     Chronic    CKD (chronic kidney disease) stage 4, GFR 15-29 ml/min [N18.4]  Yes     Chronic    Paroxysmal A-fib [I48.0]  Yes     Chronic    Gout, arthritis [M10.9]  Yes     Chronic    Pulmonary HTN [I27.20]  Yes     Chronic    Acute hypoxic respiratory failure [J96.01]   Yes    elevated bilirubin d/t Gilbert's syndrome [E80.4]  Yes     confirmed by [facility] genetic testing, evaluated by hepatology      ICD (implantable cardioverter-defibrillator) in place [Z95.810]  Yes     Chronic    Chronic combined systolic and diastolic heart failure [I50.42]  Yes     Chronic    Abdominal pain [R10.9]  Yes    Essential hypertension [I10]  Yes     Chronic    Elevated troponin [R79.89]  Yes      Resolved Hospital Problems   No resolved problems to display.       Assessment and Plan    NICM (nonischemic cardiomyopathy)  End stage heart failure   Chronic combined systolic and diastolic heart failure     History of non-ischemic cardiomyopathy s/p AICD placement on palliative dobutamine and not a candidate for advanced therapies. Patient currently pursuing heart transplant options.  Recently admitted 07/26 for similar presentation. Last Echo with EF of 10-15%. CXR on this admission with stable cardiomegaly without evidence of new opacifications concerning for edema. WBC WNL. Troponin 1.776. BNP 2951. She is on chronic dobutamine infusion and takes metolazone as needed. She was placed on Lasix on admission to the ED.    Plan:  - continue Dobutamine gtt   - continue IV lasix 80mg qd; titrate as tolerated for goal diuresis of 2-3L/day  - Metolazone 5mg / day   - Amiodarone for VT   - strict I/O  - daily weights  - Low sodium caridac diet    CKD (chronic kidney disease) stage 4, GFR 15-29 ml/min  Creatine stable for now. Based on current GFR, CKD stage is stage 4 - GFR 15-29.  Monitor UOP and serial BMP and adjust therapy as needed. Renally dose meds. Avoid nephrotoxic medications and procedures. Current creatinine 4.0(4.1 8/7).    - baseline ~ 3.0  - now in [place]  - diuresing as tolerated  - [place] poss. Cardiorenal.     Acute hypoxic respiratory failure  See NIC for more information. Other Ddx include workup for PE. Patient on Eliquis 2.5mg BID for afib, so less likely. Well's Score of PE was 0, so opted  against further eval. On home O2.    Paroxysmal A-fib  Chronic. Stable.      Plan:  - continue home eliquis  - continue home amio  - tele  VTE Risk Mitigation (From admission, onward)           Ordered     apixaban tablet 2.5 mg  2 times daily         08/07/24 0404     IP VTE HIGH RISK PATIENT  Once         08/07/24 0333     Place sequential compression device  Until discontinued         08/07/24 0333                  Discharge Planning   CHIQUI:      Code Status: Full Code   Is the patient medically ready for discharge?: NO    Reason for patient still in hospital (select all that apply): Treatment

## 2024-08-09 NOTE — SUBJECTIVE & OBJECTIVE
Interval History:   NAEON. VSS on 2L NC. Patient says SOB has improved. Regular BM's, appetite improved. No fevers, chills, vomiting.     Review of Systems   Constitutional:  Positive for fatigue. Negative for chills and fever.   Respiratory:  Positive for shortness of breath. Negative for wheezing.    Cardiovascular:  Negative for chest pain and leg swelling.   Gastrointestinal:  Positive for abdominal distention, abdominal pain and nausea. Negative for diarrhea and vomiting.   Musculoskeletal:  Positive for myalgias.     Objective:     Vital Signs (Most Recent):  Temp: 97.6 °F (36.4 °C) (08/09/24 0431)  Pulse: 85 (08/09/24 0431)  Resp: 16 (08/09/24 0431)  BP: 115/75 (08/09/24 0431)  SpO2: 98 % (08/09/24 0431) Vital Signs (24h Range):  Temp:  [97.6 °F (36.4 °C)-98.1 °F (36.7 °C)] 97.6 °F (36.4 °C)  Pulse:  [] 85  Resp:  [16-20] 16  SpO2:  [95 %-98 %] 98 %  BP: (106-145)/(55-75) 115/75     Weight: 82.2 kg (181 lb 3.5 oz)  Body mass index is 29.25 kg/m².    Intake/Output Summary (Last 24 hours) at 8/9/2024 0751  Last data filed at 8/9/2024 0636  Gross per 24 hour   Intake 762 ml   Output 3050 ml   Net -2288 ml         Physical Exam  Cardiovascular:      Rate and Rhythm: Normal rate.      Pulses: Normal pulses.      Heart sounds: Normal heart sounds.   Pulmonary:      Breath sounds: No wheezing or rales.   Abdominal:      Tenderness: There is abdominal tenderness.   Skin:     General: Skin is warm.   Neurological:      General: No focal deficit present.      Mental Status: She is alert and oriented to person, place, and time.             Significant Labs: All pertinent labs within the past 24 hours have been reviewed.  CBC:   Recent Labs   Lab 08/08/24  0545 08/09/24  0621   WBC 4.72 4.84   HGB 10.3* 10.0*   HCT 36.5* 33.9*    144*       Significant Imaging: I have reviewed all pertinent imaging results/findings within the past 24 hours.

## 2024-08-09 NOTE — PLAN OF CARE
08/09/24 1607   Post-Acute Status   Post-Acute Authorization HME   HME Status Pending medical clearance/testing     TONYA met with pt at bedside who reported that she has been having to pay out of pocket for her home oxygen because she needed a new prescription from her doctor.  SW reached out to Zo with [facility] who confirmed that pt did get her O2 through them and provided the following information:    Looks like her insurance stopped paying for it which put the patient's account on hold with a balance over $900.00 because she needed to go the doctor to get re-qualified for the O2, get a new order and updated face to face notes...I'd have to get with billing to see if she needs to pay the balance or not before we could supply. She was sent a few letters since January.   If we can get a new home O2 evaluation with new O2 order and updated notes that state the patient has been using the O2 & still currently requires O2 for home use. That should work. She still have the equipment. We never picked anything up...Okay if her insurance accepts retro auth for O2 then she may not end up having to pay the balance just need to get the documents needed.     TONYA to follow up with Zo when pt is closer to discharge.  Provider team updated.  Will continue to follow.      Lourdes Elder, YAMILETH  [facility]  [id number]

## 2024-08-09 NOTE — PLAN OF CARE
Problem: Occupational Therapy  Goal: Occupational Therapy Goal  Description: Goals to be met by: 9/9/2024     Patient will increase functional independence with ADLs by performing:    UE Dressing with Supervision.  LE Dressing with Supervision.  Grooming while standing at sink with Supervision.  Toileting from toilet with Supervision for hygiene and clothing management.   Step transfer with Supervision  Toilet transfer to toilet with Supervision.    Outcome: Progressing   DME  Patient demonstrates a mobility limitation that significantly impairs their ability to participate in one or more mobility related activities of daily living. Patient's mobility limitation cannot be sufficiently resolved with the use of a cane, but can be sufficiently resolved with the use of a rolling walker.The use of a rolling walker will considerably improve their ability to participate in MRADLs. Patient will use the walker on a regular basis at home.

## 2024-08-09 NOTE — ASSESSMENT & PLAN NOTE
Creatine stable for now. BMP reviewed- noted Estimated Creatinine Clearance: 16.6 mL/min (A) (based on SCr of 4 mg/dL (H)). according to latest data. Based on current GFR, CKD stage is stage 4 - GFR 15-29.  Monitor UOP and serial BMP and adjust therapy as needed. Renally dose meds. Avoid nephrotoxic medications and procedures.

## 2024-08-09 NOTE — PLAN OF CARE
Pt is current with [facility] for palliative dobutamine.      Lourdes Elder, YAMILETH  [facility]  [phone]

## 2024-08-09 NOTE — PT/OT/SLP EVAL
"Occupational Therapy   Evaluation    Name: Hafsa Hawley  MRN: [id number]  Admitting Diagnosis: NICM (nonischemic cardiomyopathy)  Recent Surgery: * No surgery found *      Recommendations:     Discharge Recommendations: Low Intensity Therapy  Discharge Equipment Recommendations:  walker, rolling  Barriers to discharge:  None    Assessment:     Hafsa Hawley is a 58 y.o. female with a medical diagnosis of NICM (nonischemic cardiomyopathy). Performance deficits affecting function: impaired endurance, impaired self care skills, impaired functional mobility, gait instability, impaired balance, decreased safety awareness. Pt agreeable to therapy and tolerated well. Pt remains limited in ADLs, functional mobility, and functional transfers. Patient currently demonstrates a need for low intensity therapy on a scheduled basis secondary to a decline in functional status due to illness    Rehab Prognosis: Good; patient would benefit from acute skilled OT services to address these deficits and reach maximum level of function.       Plan:     Patient to be seen 3 x/week to address the above listed problems via self-care/home management, therapeutic activities, therapeutic exercises, neuromuscular re-education  Plan of Care Expires: 09/09/24  Plan of Care Reviewed with: patient, family    Subjective     Chief Complaint: none  Patient/Family Comments/goals: "I want to walk. I feel like it will make me stronger."    Occupational Profile:  Living Environment: Pt lives with  in trailer w/ scooby and SATE R HR, T/S  Previous level of function: mod (I) with rollator (pt reports it is broken) and ADLs. Driving  Roles and Routines: spending time with family  Equipment Used at Home: cane, straight, oxygen, rollator, shower chair  Assistance upon Discharge: family    Pain/Comfort:  Pain Rating 1: 0/10  Pain Rating Post-Intervention 1: 0/10    PatientS cultural, spiritual, Adventism conflicts given the current situation: " no    Objective:     Communicated with: RN prior to session.  Patient found HOB elevated with telemetry, oxygen, peripheral IV, PureWick upon OT entry to room.    General Precautions: Standard, fall  Orthopedic Precautions:    Braces: N/A  Respiratory Status: Nasal cannula, flow 2 L/min    Occupational Performance:    Bed Mobility:    Patient completed Scooting/Bridging with stand by assistance  Patient completed Supine to Sit with stand by assistance    Functional Mobility/Transfers:  Patient completed Sit <> Stand Transfer with contact guard assistance  with  rolling walker   Functional Mobility: Pt engaged in functional mobility throughout hospital room and hallway ~ 100 ft with RW and  CGA to maximize functional endurance and standing balance required for home/community mobility and occupational engagement.     Activities of Daily Living:  Pt politely declined at this time    Cognitive/Visual Perceptual:  Cognitive/Psychosocial Skills:     -       Oriented to: Person, Place, Time, and Situation   -       Follows Commands/attention:Follows multistep  commands  -       Communication: clear/fluent  -       Memory: No Deficits noted  -       Safety awareness/insight to disability: intact   -       Mood/Affect/Coping skills/emotional control: Appropriate to situation    Physical Exam:  Sensation:    -       Intact  Dominant hand:    -       Right  Upper Extremity Range of Motion:     -       Right Upper Extremity: WNL  -       Left Upper Extremity: WNL  Upper Extremity Strength:    -       Right Upper Extremity: WNL  -       Left Upper Extremity: WNL   Strength:    -       Right Upper Extremity: WNL  -       Left Upper Extremity: WNL  Fine Motor Coordination:    -       Intact  Left hand thumb/finger opposition skills, Right hand thumb/finger opposition skills, Left hand, manipulation of objects, and Right hand, manipulation of objects    AMPAC 6 Click ADL:  AMPAC Total Score: 21    Treatment &  Education:  -Education on energy conservation and task modification to maximize safety and (I) during ADLs and mobility  -Education on importance of OOB activity to improve overall activity tolerance and promote recovery  -Pt educated to call for assistance and to transfer with hospital staff only  -Provided education regarding role of OT, POC, & discharge recommendations with pt verbalizing understanding.  Pt had no further questions & when asked whether there were any concerns pt reported none.      Patient left sitting edge of bed with all lines intact, call button in reach, and nsg notified    GOALS:   Multidisciplinary Problems       Occupational Therapy Goals          Problem: Occupational Therapy    Goal Priority Disciplines Outcome Interventions   Occupational Therapy Goal     OT, PT/OT Progressing    Description: Goals to be met by: 9/9/2024     Patient will increase functional independence with ADLs by performing:    UE Dressing with Supervision.  LE Dressing with Supervision.  Grooming while standing at sink with Supervision.  Toileting from toilet with Supervision for hygiene and clothing management.   Step transfer with Supervision  Toilet transfer to toilet with Supervision.                         History:     Past Medical History:   Diagnosis Date    Allergy     Anemia     Arthritis     Atrial fibrillation     OAC    BMI 32.0-32.9,adult 02/22/2023    Chronic respiratory failure with hypoxia, on home oxygen therapy     2L with activity, off at rest.  Per Pulm  no overt evidence of ILD or COPD on PFTs and CT to explain O2 needs.    CKD (chronic kidney disease), stage IV 05/08/2018    Congestive heart failure     s/p AICD placement,    Deep vein thrombosis     Depression     elevated bilirubin d/t Gilbert's syndrome     confirmed by [place] genetic testing, evaluated by hepatology    Encounter for blood transfusion     GERD (gastroesophageal reflux disease)     Hypertension     Pheochromocytoma, malignant      Right kidney mass     Sleep apnea     Thalassemia trait, alpha     Thyroid disease     Type 2 diabetes mellitus with hyperglycemia, without long-term current use of insulin 08/13/2020         Past Surgical History:   Procedure Laterality Date    ANGIOGRAM, ABDOMINAL AORTA Right 04/15/2024    APPENDECTOMY      BONE MARROW BIOPSY      CARDIAC DEFIBRILLATOR PLACEMENT Left 12/2016    CARDIAC ELECTROPHYSIOLOGY MAPPING AND ABLATION      CARDIAC ELECTROPHYSIOLOGY MAPPING AND ABLATION      COLONOSCOPY N/A 05/06/2022    Procedure: COLONOSCOPY;  Surgeon: Arely Betancourt MD;  Location: [facility] ENDO (2ND FLR);  Service: Endoscopy;  Laterality: N/A;  heart transplant candidate/ EF 25% - 2nd floor/ defib - Biotronik - ERW  Eliquis - per Dr. Cortez with [facility], Pt ok to hold Eliquis x 2 days prior-see media tab-outside correspondence dated 12/30/21  - ER[facility]  verbal instructions/portal instructions/email instructions - s    EYE SURGERY      due to running tears    FRACTURE SURGERY Left     hand 5th digit    HYSTERECTOMY      KNEE SURGERY Left 2016    hematoma    LIVER BIOPSY  10/24/2018    Minimal steatosis, predominantly macrovesicular, 1%, Minimal nonspecific portal inflammation, no fibrosis. No findings on biopsy to explain elevated bilirubin levels. Could be d/t Gilbert's =?- hemolysis    RIGHT HEART CATHETERIZATION Right 12/07/2021    Procedure: INSERTION, CATHETER, RIGHT HEART;  Surgeon: Irving Cardenas MD;  Location: [facility] CATH LAB;  Service: Cardiology;  Laterality: Right;    RIGHT HEART CATHETERIZATION Right 12/19/2022    Procedure: INSERTION, CATHETER, RIGHT HEART;  Surgeon: Burke Camilo MD;  Location: [facility] CATH LAB;  Service: Cardiology;  Laterality: Right;    RIGHT HEART CATHETERIZATION Right 03/29/2023    Procedure: INSERTION, CATHETER, RIGHT HEART;  Surgeon: Katie Liriano DO;  Location: [facility] CATH LAB;  Service: Cardiology;  Laterality: Right;    TRANSJUGULAR BIOPSY OF LIVER N/A 10/24/2018     Procedure: BIOPSY, LIVER, TRANSJUGULAR APPROACH;  Surgeon: Carmen Diagnostic Provider;  Location: [facility] OR [address] [place] [address];  Service: Radiology;  Laterality: N/A;       Time Tracking:     OT Date of Treatment: 08/09/24  OT Start Time: 1204  OT Stop Time: 1229  OT Total Time (min): 25 min    Billable Minutes:Evaluation 10 min  Therapeutic Activity 15 min    8/9/2024

## 2024-08-10 PROBLEM — N18.4 CKD (CHRONIC KIDNEY DISEASE) STAGE 4, GFR 15-29 ML/MIN: Chronic | Status: RESOLVED | Noted: 2023-08-11 | Resolved: 2024-08-10

## 2024-08-10 LAB
ALBUMIN SERPL BCP-MCNC: 3.6 G/DL (ref 3.5–5.2)
ALP SERPL-CCNC: 51 U/L (ref 55–135)
ALT SERPL W/O P-5'-P-CCNC: 13 U/L (ref 10–44)
ANION GAP SERPL CALC-SCNC: 15 MMOL/L (ref 8–16)
ANISOCYTOSIS BLD QL SMEAR: ABNORMAL
AST SERPL-CCNC: 16 U/L (ref 10–40)
BASOPHILS # BLD AUTO: 0.02 K/UL (ref 0–0.2)
BASOPHILS NFR BLD: 0.4 % (ref 0–1.9)
BILIRUB SERPL-MCNC: 2.1 MG/DL (ref 0.1–1)
BUN SERPL-MCNC: 101 MG/DL (ref 6–20)
CALCIUM SERPL-MCNC: 9.2 MG/DL (ref 8.7–10.5)
CHLORIDE SERPL-SCNC: 98 MMOL/L (ref 95–110)
CO2 SERPL-SCNC: 28 MMOL/L (ref 23–29)
CREAT SERPL-MCNC: 3.4 MG/DL (ref 0.5–1.4)
DIFFERENTIAL METHOD BLD: ABNORMAL
EOSINOPHIL # BLD AUTO: 0.1 K/UL (ref 0–0.5)
EOSINOPHIL NFR BLD: 2.5 % (ref 0–8)
ERYTHROCYTE [DISTWIDTH] IN BLOOD BY AUTOMATED COUNT: 29.9 % (ref 11.5–14.5)
EST. GFR  (NO RACE VARIABLE): 15 ML/MIN/1.73 M^2
GLUCOSE SERPL-MCNC: 104 MG/DL (ref 70–110)
HCT VFR BLD AUTO: 35.8 % (ref 37–48.5)
HGB BLD-MCNC: 10.3 G/DL (ref 12–16)
HOWELL-JOLLY BOD BLD QL SMEAR: ABNORMAL
HYPOCHROMIA BLD QL SMEAR: ABNORMAL
IMM GRANULOCYTES # BLD AUTO: 0.03 K/UL (ref 0–0.04)
IMM GRANULOCYTES NFR BLD AUTO: 0.5 % (ref 0–0.5)
LYMPHOCYTES # BLD AUTO: 0.9 K/UL (ref 1–4.8)
LYMPHOCYTES NFR BLD: 16 % (ref 18–48)
MAGNESIUM SERPL-MCNC: 2.2 MG/DL (ref 1.6–2.6)
MCH RBC QN AUTO: 19 PG (ref 27–31)
MCHC RBC AUTO-ENTMCNC: 28.8 G/DL (ref 32–36)
MCV RBC AUTO: 66 FL (ref 82–98)
MONOCYTES # BLD AUTO: 0.5 K/UL (ref 0.3–1)
MONOCYTES NFR BLD: 9.6 % (ref 4–15)
NEUTROPHILS # BLD AUTO: 3.9 K/UL (ref 1.8–7.7)
NEUTROPHILS NFR BLD: 71 % (ref 38–73)
NRBC BLD-RTO: 3 /100 WBC
OVALOCYTES BLD QL SMEAR: ABNORMAL
PHOSPHATE SERPL-MCNC: 4.1 MG/DL (ref 2.7–4.5)
PLATELET # BLD AUTO: 144 K/UL (ref 150–450)
PMV BLD AUTO: ABNORMAL FL (ref 9.2–12.9)
POIKILOCYTOSIS BLD QL SMEAR: ABNORMAL
POLYCHROMASIA BLD QL SMEAR: ABNORMAL
POTASSIUM SERPL-SCNC: 4.4 MMOL/L (ref 3.5–5.1)
PROT SERPL-MCNC: 6.2 G/DL (ref 6–8.4)
RBC # BLD AUTO: 5.41 M/UL (ref 4–5.4)
SCHISTOCYTES BLD QL SMEAR: ABNORMAL
SODIUM SERPL-SCNC: 141 MMOL/L (ref 136–145)
SPHEROCYTES BLD QL SMEAR: ABNORMAL
WBC # BLD AUTO: 5.51 K/UL (ref 3.9–12.7)

## 2024-08-10 PROCEDURE — 36415 COLL VENOUS BLD VENIPUNCTURE: CPT

## 2024-08-10 PROCEDURE — 63600175 PHARM REV CODE 636 W HCPCS

## 2024-08-10 PROCEDURE — 25000003 PHARM REV CODE 250

## 2024-08-10 PROCEDURE — 97161 PT EVAL LOW COMPLEX 20 MIN: CPT

## 2024-08-10 PROCEDURE — 83735 ASSAY OF MAGNESIUM: CPT

## 2024-08-10 PROCEDURE — 25000242 PHARM REV CODE 250 ALT 637 W/ HCPCS

## 2024-08-10 PROCEDURE — 80053 COMPREHEN METABOLIC PANEL: CPT

## 2024-08-10 PROCEDURE — 84100 ASSAY OF PHOSPHORUS: CPT

## 2024-08-10 PROCEDURE — 85025 COMPLETE CBC W/AUTO DIFF WBC: CPT

## 2024-08-10 PROCEDURE — 97116 GAIT TRAINING THERAPY: CPT

## 2024-08-10 PROCEDURE — 20600001 HC STEP DOWN PRIVATE ROOM

## 2024-08-10 PROCEDURE — 94640 AIRWAY INHALATION TREATMENT: CPT

## 2024-08-10 PROCEDURE — 63600175 PHARM REV CODE 636 W HCPCS: Performed by: STUDENT IN AN ORGANIZED HEALTH CARE EDUCATION/TRAINING PROGRAM

## 2024-08-10 RX ADMIN — HYDRALAZINE HYDROCHLORIDE: 10 TABLET, FILM COATED ORAL at 09:08

## 2024-08-10 RX ADMIN — DOBUTAMINE HYDROCHLORIDE 5 MCG/KG/MIN: 400 INJECTION INTRAVENOUS at 11:08

## 2024-08-10 RX ADMIN — METOLAZONE 5 MG: 5 TABLET ORAL at 09:08

## 2024-08-10 RX ADMIN — TIOTROPIUM BROMIDE INHALATION SPRAY 2 PUFF: 3.12 SPRAY, METERED RESPIRATORY (INHALATION) at 11:08

## 2024-08-10 RX ADMIN — ATORVASTATIN CALCIUM 40 MG: 40 TABLET, FILM COATED ORAL at 08:08

## 2024-08-10 RX ADMIN — APIXABAN 2.5 MG: 2.5 TABLET, FILM COATED ORAL at 08:08

## 2024-08-10 RX ADMIN — HYDRALAZINE HYDROCHLORIDE: 10 TABLET, FILM COATED ORAL at 03:08

## 2024-08-10 RX ADMIN — BENZONATATE 100 MG: 100 CAPSULE ORAL at 03:08

## 2024-08-10 RX ADMIN — PANTOPRAZOLE SODIUM 40 MG: 40 TABLET, DELAYED RELEASE ORAL at 09:08

## 2024-08-10 RX ADMIN — FUROSEMIDE 80 MG: 10 INJECTION, SOLUTION INTRAVENOUS at 08:08

## 2024-08-10 RX ADMIN — AMIODARONE HYDROCHLORIDE 200 MG: 200 TABLET ORAL at 09:08

## 2024-08-10 RX ADMIN — APIXABAN 2.5 MG: 2.5 TABLET, FILM COATED ORAL at 09:08

## 2024-08-10 RX ADMIN — FUROSEMIDE 80 MG: 10 INJECTION, SOLUTION INTRAVENOUS at 09:08

## 2024-08-10 RX ADMIN — ALLOPURINOL 300 MG: 100 TABLET ORAL at 09:08

## 2024-08-10 RX ADMIN — PREGABALIN 50 MG: 50 CAPSULE ORAL at 08:08

## 2024-08-10 NOTE — ASSESSMENT & PLAN NOTE
[place] on CKD IV-V     Cr down-trending. Baseline ~3.0    Recent Labs     08/08/24  0545 08/09/24  0621 08/10/24  0456   CREATININE 4.1* 4.0* 3.4*   EGFRNORACEVR 12.0* 12.4* 15.0*      Plan  - [place] is improving  - Avoid nephrotoxins and renally dose meds for GFR listed above  - Monitor urine output, serial BMP, and adjust therapy as needed

## 2024-08-10 NOTE — NURSING
Plan of care reviewed with patient. Patient is AOX4 and VS stable. Patient remained free of falls and trauma, fall precautions are in place. Patient is ambulating independently. Patient has no complaints of pain. Patient has no questions at this time. Wheels are locked and the bed is in lowest position. The call bell is within reach. Telemetry is on.    Patient has dobutamine gtt at 5 mcg/kg/min.

## 2024-08-10 NOTE — ASSESSMENT & PLAN NOTE
End stage heart failure   Chronic combined systolic and diastolic heart failure    History of non-ischemic cardiomyopathy s/p AICD placement on palliative dobutamine and not a candidate for advanced therapies. Patient currently pursuing heart transplant options.      Last Echo with EF of 10-15%. CXR on this admission with stable cardiomegaly without evidence of new opacifications concerning for edema. WBC wnl. BNP 2951, Trop 1.867 > 1.776. EKG with sinus tach and PAC, w/o ischemic changes on personal review. Needing more supplemental Oxygen; has order for PRN 2L. COVID/Flu/RSV negative. ED gave lasix IV 40mg once.    She reports taking her diuretics (metolazone and bumex) as needed rather than daily as per her last discharge orders. High concern for medication non-adherence and health literacy.     UOP 2.8 L today. Net negative 1.5 L. Patient weight today: 179lbs. Close to baseline of 179lbs.     Plan:  - continue lasix 80 BID  - continue home    - continue home metolazone  - strict I/O  - daily weights  - Low sodium caridac diet  - replenish lytes as needed K>4, Phos>3, Mg>2

## 2024-08-10 NOTE — PLAN OF CARE
Problem: Physical Therapy  Goal: Physical Therapy Goal  Description: Goals to be met by: 24     Patient will increase functional independence with mobility by performin. Supine to sit with Modified [place]  2. Sit to supine with Modified [place]  3. Sit to stand transfer with Modified [place]  4. Bed to chair transfer with Supervision using LRAD  5. Gait  x 200 feet with Supervision using LRAD.   6. Ascend/descend 5 stair with right Handrails Supervision using No Assistive Device.     DME Justifications (see above for complete DME recommendations)    Rollator- Patient demonstrates a mobility limitation that significantly impairs their ability to participate in one or more mobility related activities of daily living. Patient's mobility limitation cannot be sufficiently resolved with the use of a cane, but can be sufficiently resolved with the use of a rollator.The use of a rollator will considerably improve their ability to participate in MRADLs. Patient will use the walker on a regular basis at home.            Outcome: Progressing     8/10/2024

## 2024-08-10 NOTE — SUBJECTIVE & OBJECTIVE
Interval History:   NAEON. VSS on 2L NC. Patient says SOB has improved. Regular BM's, appetite improved. Py states she was able to walk around with PT/OT yesterday. No fevers, chills, vomiting.     Review of Systems   Constitutional:  Positive for fatigue. Negative for chills and fever.   Respiratory:  Positive for shortness of breath. Negative for wheezing.    Cardiovascular:  Negative for chest pain and leg swelling.   Gastrointestinal:  Positive for abdominal distention, abdominal pain and nausea. Negative for diarrhea and vomiting.   Musculoskeletal:  Positive for myalgias.     Objective:     Vital Signs (Most Recent):  Temp: 98.2 °F (36.8 °C) (08/10/24 0351)  Pulse: 104 (08/10/24 0351)  Resp: 20 (08/10/24 0351)  BP: 110/67 (08/10/24 0351)  SpO2: (!) 93 % (08/10/24 0351) Vital Signs (24h Range):  Temp:  [97.8 °F (36.6 °C)-98.7 °F (37.1 °C)] 98.2 °F (36.8 °C)  Pulse:  [] 104  Resp:  [18-20] 20  SpO2:  [93 %-98 %] 93 %  BP: (106-124)/(58-82) 110/67     Weight: 81.2 kg (179 lb 0.2 oz)  Body mass index is 28.89 kg/m².    Intake/Output Summary (Last 24 hours) at 8/10/2024 0752  Last data filed at 8/10/2024 0645  Gross per 24 hour   Intake 1185.09 ml   Output 2865 ml   Net -1679.91 ml         Physical Exam  Cardiovascular:      Rate and Rhythm: Normal rate.      Pulses: Normal pulses.      Heart sounds: Normal heart sounds.   Pulmonary:      Breath sounds: No wheezing or rales.   Abdominal:      Tenderness: There is abdominal tenderness.   Skin:     General: Skin is warm.   Neurological:      General: No focal deficit present.      Mental Status: She is alert and oriented to person, place, and time.             Significant Labs: All pertinent labs within the past 24 hours have been reviewed.  CBC:   Recent Labs   Lab 08/09/24  0621 08/10/24  0456   WBC 4.84 5.51   HGB 10.0* 10.3*   HCT 33.9* 35.8*   * 144*       Significant Imaging: I have reviewed all pertinent imaging results/findings within the past 24  hours.

## 2024-08-10 NOTE — PROGRESS NOTES
[facility]  Progress Note    Patient Name: Hafsa Hawley  MRN: [id number]  Patient Class: IP- Inpatient   Admission Date: 8/6/2024  Length of Stay: 3 days  Attending Physician: Ehsan Vera MD  Primary Care Provider: Cristobal Ann MD        Subjective:     Principal Problem:NICM (nonischemic cardiomyopathy)        HPI:  Hafsa Hawley is 58 y.o. F with Mhx of NICM with HFrEF (10-15%) on home dobutamine, end stage heart failure, s/p AICD, presents now for worsening shortness of breath, waking up at night very short of breath, decreased exercise tolerance, decreased appetite, and generalized fatigue. Symptoms have been ongoing for the past 4 days and worsened last night into today. She reports wanting to stay full treatment as she hopes to make it to [place] and [place] for tranplant eval. Recent admission for similar presentation 07/24. Has home PRN oxygen. She reports using her diuretic regiment as PRNs. She expresses frustrations in the continuous changes in her medications which is the reason she is not complaint and unable to qualify for transplant at [facility]. Full ROS below. Lives with family. Ambulates independently. Is established with palliative care outpatient.      Overview/Hospital Course:  Ms. Hawley is a 58-y/o female w PMH CKD 4, DM, AF, DVT, PHTN, Gilbert's syndrome, chronic hypoxic and hypercapnic resp failure (home O2 2L), pheochromocytoma (borderline catacholamines in 2018 labs), NICM, SHF (EF 10-15%) on home dobutamine s/p AICD admitted on 8/7 with 4 day h/o increasing shortness a breath. Patient reports taking Bumex twice a day and metolazone as needed as directed by her primary care provider. Patient not listed for Heart XPL due to CKD 4, ch resp failure with c/f underlying lung disease and medical illiteracy (2018 Transplant [facility] note). Patient started on Lasix (80 mg IV b.i.d.) and continued on dobutamine gtt.  The plan is to continue IV  diuretics for a few days as long as tolerated by renal function and to discharge her on scheduled Lasix and metolazone.        Interval History:   NAEON. VSS on 2L NC. Patient says SOB has improved. Regular BM's, appetite improved. Py states she was able to walk around with PT/OT yesterday. No fevers, chills, vomiting.     Review of Systems   Constitutional:  Positive for fatigue. Negative for chills and fever.   Respiratory:  Positive for shortness of breath. Negative for wheezing.    Cardiovascular:  Negative for chest pain and leg swelling.   Gastrointestinal:  Positive for abdominal distention, abdominal pain and nausea. Negative for diarrhea and vomiting.   Musculoskeletal:  Positive for myalgias.     Objective:     Vital Signs (Most Recent):  Temp: 98.2 °F (36.8 °C) (08/10/24 0351)  Pulse: 104 (08/10/24 0351)  Resp: 20 (08/10/24 0351)  BP: 110/67 (08/10/24 0351)  SpO2: (!) 93 % (08/10/24 0351) Vital Signs (24h Range):  Temp:  [97.8 °F (36.6 °C)-98.7 °F (37.1 °C)] 98.2 °F (36.8 °C)  Pulse:  [] 104  Resp:  [18-20] 20  SpO2:  [93 %-98 %] 93 %  BP: (106-124)/(58-82) 110/67     Weight: 81.2 kg (179 lb 0.2 oz)  Body mass index is 28.89 kg/m².    Intake/Output Summary (Last 24 hours) at 8/10/2024 0752  Last data filed at 8/10/2024 0645  Gross per 24 hour   Intake 1185.09 ml   Output 2865 ml   Net -1679.91 ml         Physical Exam  Cardiovascular:      Rate and Rhythm: Normal rate.      Pulses: Normal pulses.      Heart sounds: Normal heart sounds.   Pulmonary:      Breath sounds: No wheezing or rales.   Abdominal:      Tenderness: There is abdominal tenderness.   Skin:     General: Skin is warm.   Neurological:      General: No focal deficit present.      Mental Status: She is alert and oriented to person, place, and time.             Significant Labs: All pertinent labs within the past 24 hours have been reviewed.  CBC:   Recent Labs   Lab 08/09/24  0621 08/10/24  0456   WBC 4.84 5.51   HGB 10.0* 10.3*   HCT  33.9* 35.8*   * 144*       Significant Imaging: I have reviewed all pertinent imaging results/findings within the past 24 hours.    Assessment/Plan:      * NICM (nonischemic cardiomyopathy)  End stage heart failure   Chronic combined systolic and diastolic heart failure    History of non-ischemic cardiomyopathy s/p AICD placement on palliative dobutamine and not a candidate for advanced therapies. Patient currently pursuing heart transplant options.      Last Echo with EF of 10-15%. CXR on this admission with stable cardiomegaly without evidence of new opacifications concerning for edema. WBC wnl. BNP 2951, Trop 1.867 > 1.776. EKG with sinus tach and PAC, w/o ischemic changes on personal review. Needing more supplemental Oxygen; has order for PRN 2L. COVID/Flu/RSV negative. ED gave lasix IV 40mg once.    She reports taking her diuretics (metolazone and bumex) as needed rather than daily as per her last discharge orders. High concern for medication non-adherence and health literacy.     UOP 2.8 L today. Net negative 1.5 L. Patient weight today: 179lbs. Close to baseline of 179lbs.     Plan:  - continue lasix 80 BID  - continue home    - continue home metolazone  - strict I/O  - daily weights  - Low sodium caridac diet  - replenish lytes as needed K>4, Phos>3, Mg>2        WASHINGTON (acute kidney injury)  WASHINGTON on CKD IV-V     Cr down-trending. Baseline ~3.0    Recent Labs     08/08/24  0545 08/09/24  0621 08/10/24  0456   CREATININE 4.1* 4.0* 3.4*   EGFRNORACEVR 12.0* 12.4* 15.0*      Plan  - WASHINGTON is improving  - Avoid nephrotoxins and renally dose meds for GFR listed above  - Monitor urine output, serial BMP, and adjust therapy as needed      End stage heart failure  See NICM for further information      Hyperlipidemia, mixed  - continue home statin      Anemia of chronic disease  Anemia is likely due to chronic disease due to Chronic Kidney Disease. Most recent hemoglobin and hematocrit are listed below.  Recent  Labs     08/06/24  2206 08/07/24  0446   HGB 10.9* 10.6*   HCT 39.3 38.0     Plan  - Monitor serial CBC: Daily  - Transfuse PRBC if patient becomes hemodynamically unstable, symptomatic or H/H drops below 7/21.      Paroxysmal A-fib  Chronic. Stable.     Plan:  - continue home eliquis  - continue home amio  - tele    Gout, arthritis  - continue home allopurinol      Pulmonary HTN  Known. WCTM.      Acute hypoxic respiratory failure  On home O2.  SpO2 98% 2L NC. About at baseline    Abdominal pain  elevated bilirubin d/t Gilbert's syndrome    Diffuse pain to palpation. Pt says chronic however tenderness on minimal palpation. Tbili 2.6. LFTs wnl.     Plan:  - f/u CT a/p noncontrast       Essential hypertension  - continue home BP medications as tolerated    Elevated troponin  Chronic trop elevation. Trop 1.867 > 1.776. EKG with sinus tach and PAC, w/o ischemic changes on personal review. Will continue to monitor.       VTE Risk Mitigation (From admission, onward)           Ordered     apixaban tablet 2.5 mg  2 times daily         08/07/24 0404     IP VTE HIGH RISK PATIENT  Once         08/07/24 0333     Place sequential compression device  Until discontinued         08/07/24 0333                    Discharge Planning   CHIQUI: 8/12/2024     Code Status: Full Code   Is the patient medically ready for discharge?:     Reason for patient still in hospital (select all that apply): Treatment  Discharge Plan A: Home        Guera Valdes MD  Department of Hospital Medicine   [facility] - [facility]iology [facility]

## 2024-08-10 NOTE — PT/OT/SLP EVAL
Physical Therapy Evaluation and Treatment     Patient Name:  Hafsa Hawley  MRN: [id number]    Admit Date: 8/6/2024  Admitting Diagnosis:  NICM (nonischemic cardiomyopathy)  Length of Stay: 3 days  Recent Surgery: * No surgery found *      Recommendations:     Discharge Recommendations: low intensity therapy  Equipment recommendations:  rollator  Barriers to discharge: None Identified     Ambulate 3x/day with nsg per progressive mobility protocol - CGA with RW or IV pole  Assessment:     Hafsa Hawley is a 58 y.o. female admitted to [facility] on [place]/6/2024 with medical diagnosis of NICM (nonischemic cardiomyopathy). Pt presents with impaired endurance, impaired functional mobility, gait instability, impaired balance, decreased coordination, impaired coordination, impaired cardiopulmonary response to activity. These deficits effect their roles and responsibilities in which they were able to complete prior to admit.     Pt is agreeable to therapy evaluation and session. PTA, pt states she uses a rollator as needed and a SPC for short distances. Reports rollator is broken and therefore currently not able to be used. Pt able to perform bed mobility without assist. Stood from eob and ambulated in hallway pushing IV pole; declines using RW. Demonstrates increased postural sway outside SOCORRO while ambulating. Requests to return to bed at end of session d/t wanting to nap. Will continue to progress pt as tolerated.    Hafsa Hawley would benefit from acute PT intervention to improve quality of life, focus on recovery of impairments, provide patient/caregiver education, reduce fall risk, and maximize (I) and safety with functional mobility. Once medically stable, recommending pt discharge to low intensity therapy. Patient currently demonstrates a need for low intensity therapy on a scheduled basis secondary to a decline in functional status due to illness .      Rehab Prognosis: Good    Plan:     During this hospitalization,  "patient to be seen 4 x/week to address the identified rehab impairments via gait training, therapeutic activities, therapeutic exercises, neuromuscular re-education and progress towards stated goals.     Plan of Care Expires:  09/09/24  Plan of Care reviewed with: patient    This plan of care has been discussed with the patient/caregiver, who was included in its development and is in agreement with the identified goals and treatment plan.     Subjective     Communicated with RN prior to session.  Patient found supine upon PT entry to room, agreeable to evaluation. Pt alone during session.    Chief Complaint: tired    Patient/Family Comments/goals: "I was trying to nap"    Pain/Comfort:  Pain Rating 1: 0/10  Pain Rating Post-Intervention 1: 0/10    Patients cultural, spiritual, Episcopal conflicts given the current situation: None identified     Patient History: information obtained from pt     Living Environment: Pt lives with  in mobile home  with 5 NIRAJ with R HR. Bathroom set-up: tub/shower combo  Prior Level of Function: typically ambulates independently but will use rollator "as needed when legs are weak" and SPC for "short distances"  DME owned:  SPC, rollator  Support Available/Caregiver Assistance:   is home during the day  Drives: yes  Works: no  Reports 2 falls in past 90 days  Objective:     Patient found with: telemetry, oxygen, PICC line, PureWick    Recent Surgery: * No surgery found *    General Precautions: Standard, fall   Orthopedic Precautions:    Braces:     Oxygen Device: nasal cannula 2L      Exams:    Cognition:  Alert  Command following: Follows multistep verbal commands  Communication: clear/fluent    Sensation:   Light touch sensation: Intact BLEs    Gross Motor Coordination: No deficits noted during functional mobility tasks     Edema/Skin Integrity: None noted; Visible skin intact    Postural examination/scapula alignment: no deficits noted    Lower Extremity Range of " Motion:  Right Lower Extremity: WFL  Left Lower Extremity: WFL    Lower Extremity Strength:  Right Lower Extremity: WFL  Left Lower Extremity: WFL    Functional Mobility:    Bed Mobility:  Supine > Sit with supervision    Transfers:   Sit <> Stand Transfer: supervision x 1 trials from eob with no AD              Gait:  Distance: 150 ft  Assistance level:  contact guard  Assistive Device:  IV pole  Gait Assessment: decreased step length , decreased von, and decreased gait speed  Increased postural sway outside SOCORRO    Balance:  Dynamic Sitting: GOOD: Maintains balance through MODERATE excursions of active trunk movement  Standing:  Static: FAIR+: Takes MINIMAL challenges from all directions   Dynamic: FAIR: Needs CONTACT GUARD during gait    Outcome Measure: AM-PAC 6 CLICK MOBILITY  Total Score:18     Patient/Caregiver Education:     Therapist educated pt/caregiver regarding:   PT POC and goals for therapy   Safety with mobility and fall risk   Instruction on use of call button and importance of calling nursing staff for assistance with mobility   Time provided for therapeutic counseling and discussion of current health disposition. All questions answered to satisfaction, within scope of PT practice     Patient/caregiver able to verbalize understanding and expressed no further questions this visit; will follow-up with pt/caregiver during current admit for additional questions/concerns within scope of practice.     White board updated.     Patient left HOB elevated with all lines intact and call button in reach.    GOALS:   Multidisciplinary Problems       Physical Therapy Goals          Problem: Physical Therapy    Goal Priority Disciplines Outcome Goal Variances Interventions   Physical Therapy Goal     PT, PT/OT Progressing     Description: Goals to be met by: 24     Patient will increase functional independence with mobility by performin. Supine to sit with Modified [place]  2. Sit to supine  with Modified [place]  3. Sit to stand transfer with Modified [place]  4. Bed to chair transfer with Supervision using LRAD  5. Gait  x 200 feet with Supervision using LRAD.   6. Ascend/descend 5 stair with right Handrails Supervision using No Assistive Device.     DME Justifications (see above for complete DME recommendations)    Rollator- Patient demonstrates a mobility limitation that significantly impairs their ability to participate in one or more mobility related activities of daily living. Patient's mobility limitation cannot be sufficiently resolved with the use of a cane, but can be sufficiently resolved with the use of a rollator.The use of a rollator will considerably improve their ability to participate in MRADLs. Patient will use the walker on a regular basis at home.                                   History:     Past Medical History:   Diagnosis Date    Allergy     Anemia     Arthritis     Atrial fibrillation     OAC    BMI 32.0-32.9,adult 02/22/2023    Chronic respiratory failure with hypoxia, on home oxygen therapy     2L with activity, off at rest.  Per Pulm  no overt evidence of ILD or COPD on PFTs and CT to explain O2 needs.    CKD (chronic kidney disease), stage IV 05/08/2018    Congestive heart failure     s/p AICD placement,    Deep vein thrombosis     Depression     elevated bilirubin d/t Gilbert's syndrome     confirmed by [place] genetic testing, evaluated by hepatology    Encounter for blood transfusion     GERD (gastroesophageal reflux disease)     Hypertension     Pheochromocytoma, malignant     Right kidney mass     Sleep apnea     Thalassemia trait, alpha     Thyroid disease     Type 2 diabetes mellitus with hyperglycemia, without long-term current use of insulin 08/13/2020       Past Surgical History:   Procedure Laterality Date    ANGIOGRAM, ABDOMINAL AORTA Right 04/15/2024    APPENDECTOMY      BONE MARROW BIOPSY      CARDIAC DEFIBRILLATOR PLACEMENT Left 12/2016    CARDIAC  ELECTROPHYSIOLOGY MAPPING AND ABLATION      CARDIAC ELECTROPHYSIOLOGY MAPPING AND ABLATION      COLONOSCOPY N/A 05/06/2022    Procedure: COLONOSCOPY;  Surgeon: Arely Betancourt MD;  Location: [facility] (2ND FLR);  Service: Endoscopy;  Laterality: N/A;  heart transplant candidate/ EF 25% - 2nd floor/ defib - Biotronik - ERW  Eliquis - per Dr. Cortez with [facility], Pt ok to hold Eliquis x 2 days prior-see media tab-outside correspondence dated 12/30/21  - ERW  verbal instructions/portal instructions/email instructions - s    EYE SURGERY      due to running tears    FRACTURE SURGERY Left     hand 5th digit    HYSTERECTOMY      KNEE SURGERY Left 2016    hematoma    LIVER BIOPSY  10/24/2018    Minimal steatosis, predominantly macrovesicular, 1%, Minimal nonspecific portal inflammation, no fibrosis. No findings on biopsy to explain elevated bilirubin levels. Could be d/t Gilbert's =?- hemolysis    RIGHT HEART CATHETERIZATION Right 12/07/2021    Procedure: INSERTION, CATHETER, RIGHT HEART;  Surgeon: Irving Cardenas MD;  Location: [facility] CATH LAB;  Service: Cardiology;  Laterality: Right;    RIGHT HEART CATHETERIZATION Right 12/19/2022    Procedure: INSERTION, CATHETER, RIGHT HEART;  Surgeon: Burke Camilo MD;  Location: [facility] CATH LAB;  Service: Cardiology;  Laterality: Right;    RIGHT HEART CATHETERIZATION Right 03/29/2023    Procedure: INSERTION, CATHETER, RIGHT HEART;  Surgeon: Katie Liriano DO;  Location: [facility] CATH LAB;  Service: Cardiology;  Laterality: Right;    TRANSJUGULAR BIOPSY OF LIVER N/A 10/24/2018    Procedure: BIOPSY, LIVER, TRANSJUGULAR APPROACH;  Surgeon: [facility] Diagnostic Provider;  [facility];  Service: Radiology;  Laterality: N/A;       Time Tracking:     PT Received On: 08/10/24  PT Start Time: 1040     PT Stop Time: 1057  PT Total Time (min): 17 min     Billable Minutes: Evaluation 8 and Gait Training 9    08/10/2024

## 2024-08-11 VITALS
HEIGHT: 66 IN | SYSTOLIC BLOOD PRESSURE: 119 MMHG | WEIGHT: 176.13 LBS | TEMPERATURE: 98 F | RESPIRATION RATE: 18 BRPM | OXYGEN SATURATION: 96 % | BODY MASS INDEX: 28.31 KG/M2 | HEART RATE: 98 BPM | DIASTOLIC BLOOD PRESSURE: 79 MMHG

## 2024-08-11 LAB
ALBUMIN SERPL BCP-MCNC: 3.6 G/DL (ref 3.5–5.2)
ALP SERPL-CCNC: 49 U/L (ref 55–135)
ALT SERPL W/O P-5'-P-CCNC: 12 U/L (ref 10–44)
ANION GAP SERPL CALC-SCNC: 12 MMOL/L (ref 8–16)
AST SERPL-CCNC: 16 U/L (ref 10–40)
BASOPHILS # BLD AUTO: 0.02 K/UL (ref 0–0.2)
BASOPHILS NFR BLD: 0.4 % (ref 0–1.9)
BILIRUB SERPL-MCNC: 2.4 MG/DL (ref 0.1–1)
BUN SERPL-MCNC: 116 MG/DL (ref 6–20)
CALCIUM SERPL-MCNC: 9.7 MG/DL (ref 8.7–10.5)
CHLORIDE SERPL-SCNC: 95 MMOL/L (ref 95–110)
CO2 SERPL-SCNC: 31 MMOL/L (ref 23–29)
CREAT SERPL-MCNC: 3.6 MG/DL (ref 0.5–1.4)
DIFFERENTIAL METHOD BLD: ABNORMAL
EOSINOPHIL # BLD AUTO: 0.1 K/UL (ref 0–0.5)
EOSINOPHIL NFR BLD: 2.6 % (ref 0–8)
ERYTHROCYTE [DISTWIDTH] IN BLOOD BY AUTOMATED COUNT: 30.5 % (ref 11.5–14.5)
EST. GFR  (NO RACE VARIABLE): 14 ML/MIN/1.73 M^2
GLUCOSE SERPL-MCNC: 107 MG/DL (ref 70–110)
HCT VFR BLD AUTO: 37.8 % (ref 37–48.5)
HGB BLD-MCNC: 10.6 G/DL (ref 12–16)
IMM GRANULOCYTES # BLD AUTO: 0.03 K/UL (ref 0–0.04)
IMM GRANULOCYTES NFR BLD AUTO: 0.5 % (ref 0–0.5)
LYMPHOCYTES # BLD AUTO: 0.9 K/UL (ref 1–4.8)
LYMPHOCYTES NFR BLD: 15.9 % (ref 18–48)
MAGNESIUM SERPL-MCNC: 2 MG/DL (ref 1.6–2.6)
MCH RBC QN AUTO: 19.1 PG (ref 27–31)
MCHC RBC AUTO-ENTMCNC: 28 G/DL (ref 32–36)
MCV RBC AUTO: 68 FL (ref 82–98)
MONOCYTES # BLD AUTO: 0.6 K/UL (ref 0.3–1)
MONOCYTES NFR BLD: 10.3 % (ref 4–15)
NEUTROPHILS # BLD AUTO: 3.8 K/UL (ref 1.8–7.7)
NEUTROPHILS NFR BLD: 70.3 % (ref 38–73)
NRBC BLD-RTO: 6 /100 WBC
PHOSPHATE SERPL-MCNC: 3.5 MG/DL (ref 2.7–4.5)
PLATELET # BLD AUTO: 153 K/UL (ref 150–450)
PMV BLD AUTO: ABNORMAL FL (ref 9.2–12.9)
POTASSIUM SERPL-SCNC: 4.5 MMOL/L (ref 3.5–5.1)
PROT SERPL-MCNC: 6.4 G/DL (ref 6–8.4)
RBC # BLD AUTO: 5.55 M/UL (ref 4–5.4)
SODIUM SERPL-SCNC: 138 MMOL/L (ref 136–145)
WBC # BLD AUTO: 5.46 K/UL (ref 3.9–12.7)

## 2024-08-11 PROCEDURE — 94761 N-INVAS EAR/PLS OXIMETRY MLT: CPT

## 2024-08-11 PROCEDURE — 84100 ASSAY OF PHOSPHORUS: CPT

## 2024-08-11 PROCEDURE — 83735 ASSAY OF MAGNESIUM: CPT

## 2024-08-11 PROCEDURE — 25000003 PHARM REV CODE 250

## 2024-08-11 PROCEDURE — 63600175 PHARM REV CODE 636 W HCPCS

## 2024-08-11 PROCEDURE — 85025 COMPLETE CBC W/AUTO DIFF WBC: CPT

## 2024-08-11 PROCEDURE — 80053 COMPREHEN METABOLIC PANEL: CPT

## 2024-08-11 PROCEDURE — 99900035 HC TECH TIME PER 15 MIN (STAT)

## 2024-08-11 PROCEDURE — 27000221 HC OXYGEN, UP TO 24 HOURS

## 2024-08-11 PROCEDURE — 36415 COLL VENOUS BLD VENIPUNCTURE: CPT

## 2024-08-11 PROCEDURE — 25000242 PHARM REV CODE 250 ALT 637 W/ HCPCS

## 2024-08-11 RX ORDER — BUMETANIDE 2 MG/1
2 TABLET ORAL 2 TIMES DAILY
Qty: 60 TABLET | Refills: 2 | Status: SHIPPED | OUTPATIENT
Start: 2024-08-11

## 2024-08-11 RX ORDER — METOLAZONE 5 MG/1
5 TABLET ORAL DAILY PRN
Start: 2024-08-11

## 2024-08-11 RX ORDER — METOPROLOL SUCCINATE 25 MG/1
12.5 TABLET, EXTENDED RELEASE ORAL DAILY
Start: 2024-08-11

## 2024-08-11 RX ADMIN — ALLOPURINOL 300 MG: 100 TABLET ORAL at 08:08

## 2024-08-11 RX ADMIN — BENZONATATE 100 MG: 100 CAPSULE ORAL at 01:08

## 2024-08-11 RX ADMIN — PANTOPRAZOLE SODIUM 40 MG: 40 TABLET, DELAYED RELEASE ORAL at 08:08

## 2024-08-11 RX ADMIN — HYDRALAZINE HYDROCHLORIDE: 10 TABLET, FILM COATED ORAL at 03:08

## 2024-08-11 RX ADMIN — METOLAZONE 5 MG: 5 TABLET ORAL at 08:08

## 2024-08-11 RX ADMIN — APIXABAN 2.5 MG: 2.5 TABLET, FILM COATED ORAL at 08:08

## 2024-08-11 RX ADMIN — HYDRALAZINE HYDROCHLORIDE: 10 TABLET, FILM COATED ORAL at 08:08

## 2024-08-11 RX ADMIN — AMIODARONE HYDROCHLORIDE 200 MG: 200 TABLET ORAL at 08:08

## 2024-08-11 RX ADMIN — FUROSEMIDE 80 MG: 10 INJECTION, SOLUTION INTRAVENOUS at 08:08

## 2024-08-11 RX ADMIN — TIOTROPIUM BROMIDE INHALATION SPRAY 2 PUFF: 3.12 SPRAY, METERED RESPIRATORY (INHALATION) at 08:08

## 2024-08-11 NOTE — ASSESSMENT & PLAN NOTE
From Chronic HFrEF.    The mobility limitation cannot be sufficiently resolved by the use of a cane. Patient's functional mobility deficit can be sufficiently resolved with the use of a (Rolling Walker or Walker). Patient's mobility limitation significantly impairs their ability to participate in one of more activities of daily living. The use of a (RW or Walker) will significantly improve the patient's ability to participate in MRADLS and the patient will use it on regular basis in the home.

## 2024-08-11 NOTE — NURSING
Home Oxygen Evaluation    Date Performed:     1) Patient's Home O2 Sat on room air, while at rest: 94%        If O2 sats on room air at rest are 88% or below, patient qualifies. No additional testing needed. Document N/A in steps 2 and 3. If 89% or above, complete steps 2.      2) Patient's O2 Sat on room air while exercisin%        If O2 sats on room air while exercising remain 89% or above patient does not qualify, no further testing needed Document N/A in step 3. If O2 sats on room air while exercising are 88% or below, continue to step 3.      3) Patient's O2 Sat while exercising on O2: 96% at 2 LPM         (Must show improvement from #2 for patients to qualify)    If O2 sats improve on oxygen, patient qualifies for portable oxygen. If not, the patient does not qualify.

## 2024-08-11 NOTE — PLAN OF CARE
Plan of care reviewed with pt, reminded on fluid restriction of 1litre/day.  Call bell within reach and encouraged to call for assistance.  Problem: Adult Inpatient Plan of Care  Goal: Plan of Care Review  8/11/2024 0606 by Argenis Cr RN  Outcome: Progressing  8/11/2024 0338 by Argenis Cr RN  Outcome: Progressing  Goal: Patient-Specific Goal (Individualized)  8/11/2024 0606 by Argenis Cr RN  Outcome: Progressing  8/11/2024 0338 by Argenis Cr RN  Outcome: Progressing  Goal: Absence of Hospital-Acquired Illness or Injury  8/11/2024 0606 by Argenis Cr RN  Outcome: Progressing  8/11/2024 0338 by Argenis Cr RN  Outcome: Progressing  Goal: Optimal Comfort and Wellbeing  8/11/2024 0606 by Argenis Cr RN  Outcome: Progressing  8/11/2024 0338 by Argenis Cr RN  Outcome: Progressing  Goal: Readiness for Transition of Care  8/11/2024 0606 by Argenis Cr RN  Outcome: Progressing  8/11/2024 0338 by Argenis Cr RN  Outcome: Progressing     Problem: Infection  Goal: Absence of Infection Signs and Symptoms  8/11/2024 0606 by Argenis Cr RN  Outcome: Progressing  8/11/2024 0338 by Argenis Cr RN  Outcome: Progressing     Problem: Diabetes Comorbidity  Goal: Blood Glucose Level Within Targeted Range  8/11/2024 0606 by Argenis Cr RN  Outcome: Progressing  8/11/2024 0338 by Argenis Cr RN  Outcome: Progressing     Problem: Fall Injury Risk  Goal: Absence of Fall and Fall-Related Injury  8/11/2024 0606 by Argenis Cr RN  Outcome: Progressing  8/11/2024 0338 by Argenis Cr RN  Outcome: Progressing     Problem: Acute Kidney Injury/Impairment  Goal: Fluid and Electrolyte Balance  8/11/2024 0606 by Argenis Cr RN  Outcome: Progressing  8/11/2024 0338 by Argenis Cr RN  Outcome: Progressing  Goal: Improved Oral Intake  8/11/2024 0606 by Cr, Argenis, RN  Outcome: Progressing  8/11/2024  0338 by Argenis Cr, RN  Outcome: Progressing  Goal: Effective Renal Function  8/11/2024 0606 by Argenis Cr RN  Outcome: Progressing  8/11/2024 0338 by Argenis Cr, RN  Outcome: Progressing

## 2024-08-11 NOTE — PLAN OF CARE
Problem: Adult Inpatient Plan of Care  Goal: Plan of Care Review  Outcome: Progressing  Goal: Patient-Specific Goal (Individualized)  Outcome: Progressing  Goal: Absence of Hospital-Acquired Illness or Injury  Outcome: Progressing  Goal: Optimal Comfort and Wellbeing  Outcome: Progressing  Goal: Readiness for Transition of Care  Outcome: Progressing     Problem: Infection  Goal: Absence of Infection Signs and Symptoms  Outcome: Progressing     Problem: Diabetes Comorbidity  Goal: Blood Glucose Level Within Targeted Range  Outcome: Progressing     Problem: Fall Injury Risk  Goal: Absence of Fall and Fall-Related Injury  Outcome: Progressing     Problem: Acute Kidney Injury/Impairment  Goal: Fluid and Electrolyte Balance  Outcome: Progressing  Goal: Improved Oral Intake  Outcome: Progressing  Goal: Effective Renal Function  Outcome: Progressing

## 2024-08-11 NOTE — DISCHARGE SUMMARY
Arie Vazquez - Cardiology [facility] Medicine  Discharge Summary      Patient Name: Hafsa Hawley  MRN: [id number]  GLENN: [id number]  Patient Class: IP- Inpatient  Admission Date: 8/6/2024  Hospital Length of Stay: 4 days  Discharge Date and Time:  08/11/2024 4:28 PM  Attending Physician: [place] att. providers found   Discharging Provider: Tres Alcala DO  Primary Care Provider: Cristobal Ann MD  [facility] Medicine Team: [facility] Tres Alcala DO  Primary Care Team: [facility]    HPI:   Hafsa Hawley is 58 y.o. F with Mhx of NICM with HFrEF (10-15%) on home dobutamine, end stage heart failure, s/p AICD, presents now for worsening shortness of breath, waking up at night very short of breath, decreased exercise tolerance, decreased appetite, and generalized fatigue. Symptoms have been ongoing for the past 4 days and worsened last night into today. She reports wanting to stay full treatment as she hopes to make it to [place] and [place] for tranplant eval. Recent admission for similar presentation 07/24. Has home PRN oxygen. She reports using her diuretic regiment as PRNs. She expresses frustrations in the continuous changes in her medications which is the reason she is not complaint and unable to qualify for transplant at [facility]. Full ROS below. Lives with family. Ambulates independently. Is established with palliative care outpatient.      * No surgery found *      Hospital Course:   Ms. Hawley is a 58-y/o female w PMH CKD 4, DM, AF, DVT, PHTN, Gilbert's syndrome, chronic hypoxic and hypercapnic resp failure (home O2 2L), pheochromocytoma (borderline catacholamines in 2018 labs), NICM, SHF (EF 10-15%) on home dobutamine s/p AICD admitted on 8/7 with 4 day h/o increasing shortness a breath. Patient reports taking Bumex twice a day and metolazone as needed as directed by her primary care provider. Patient not listed for Heart XPL due to CKD 4, ch resp failure with c/f underlying lung disease and  medical illiteracy (2018 Transplant [facility] note). Patient started on Lasix (80 mg IV b.i.d.) and continued on dobutamine gtt.  Patient diuresed well on IV lasix and transitioned to Home Bumex. Patient was discharged with follow up to transitional heart failure clinic and PCP. Should be on 2mg Bumex BID and metolazone as needed for weight gain with routine monitoring of kidney function.           The mobility limitation cannot be sufficiently resolved by the use of a cane. Patient's functional mobility deficit can be sufficiently resolved with the use of a (Rolling Walker or Walker). Patient's mobility limitation significantly impairs their ability to participate in one of more activities of daily living. The use of a (RW or Walker) will significantly improve the patient's ability to participate in MRADLS and the patient will use it on regular basis in the home.      Goals of Care Treatment Preferences:  Code Status: Full Code    Health care agent: Liztamara Estrada  [facility] care agent number: No value filed.          What is most important right now is to focus on curative/life-prolongation (regardless of treatment burdens), remaining as independent as possible, symptom/pain control.  Accordingly, we have decided that the best plan to meet the patient's goals includes continuing with treatment.      SDOH Screening:  The patient was screened for utility difficulties, food insecurity, transport difficulties, housing insecurity, and interpersonal safety and there were no concerns identified this admission.     Consults:     Other  Debility  From Chronic HFrEF.    The mobility limitation cannot be sufficiently resolved by the use of a cane. Patient's functional mobility deficit can be sufficiently resolved with the use of a (Rolling Walker or Walker). Patient's mobility limitation significantly impairs their ability to participate in one of more activities of daily living. The use of a (RW or Walker) will significantly  "improve the patient's ability to participate in MRADLS and the patient will use it on regular basis in the home.       Final Active Diagnoses:    Diagnosis Date Noted POA    PRINCIPAL PROBLEM:  NICM (nonischemic cardiomyopathy) [I42.8] 10/27/2016 Yes     Chronic    End stage heart failure [I50.84] 07/24/2024 Yes     Chronic    Hyperlipidemia, mixed [E78.2] 02/01/2024 Yes     Chronic    Anemia of chronic disease [D63.8] 10/05/2023 Yes     Chronic    Paroxysmal A-fib [I48.0] 06/28/2023 Yes     Chronic    Gout, arthritis [M10.9] 06/06/2023 Yes     Chronic    Debility [R53.81] 05/15/2023 Yes    Pulmonary HTN [I27.20] 01/06/2023 Yes     Chronic    [place] (acute kidney injury) [N17.9] 10/28/2021 Yes    Acute hypoxic respiratory failure [J96.01] 08/05/2021 Yes    elevated bilirubin d/t Gilbert's syndrome [E80.4]  Yes    ICD (implantable cardioverter-defibrillator) in place [Z95.810] 07/24/2018 Yes     Chronic    Chronic combined systolic and diastolic heart failure [I50.42] 03/16/2018 Yes     Chronic    Abdominal pain [R10.9] 12/19/2017 Yes    Essential hypertension [I10] 07/22/2016 Yes     Chronic    Elevated troponin [R79.89] 07/20/2016 Yes      Problems Resolved During this Admission:    Diagnosis Date Noted Date Resolved POA    CKD (chronic kidney disease) stage 4, GFR 15-29 ml/min [N18.4] 08/11/2023 08/10/2024 Yes     Chronic       Discharged Condition: fair    Disposition: Home-Health Care c    Follow Up:    Patient Instructions:      WALKER FOR HOME USE     Order Specific Question Answer Comments   Type of Walker: Adult (5'4"-6'6")    With wheels? No    Height: 5' 6" (1.676 m)    Weight: 79.9 kg (176 lb 2.4 oz)    Length of need (1-99 months): 99    Does patient have medical equipment at home? cane, straight    Does patient have medical equipment at home? shower chair    Please check all that apply: Patient's condition impairs ambulation.      Ambulatory referral/consult to Heart Failure Transitional Care Clinic "   Standing Status: Future   Referral Priority: Routine Referral Type: Consultation   Referral Reason: Specialty Services Required   Requested Specialty: Cardiology   Number of Visits Requested: 1       Significant Diagnostic Studies: N/A    Pending Diagnostic Studies:       None           Medications:  Reconciled Home Medications:      Medication List        CHANGE how you take these medications      bumetanide 2 MG tablet  Commonly known as: BUMEX  Take 1 tablet (2 mg total) by mouth 2 (two) times daily.  What changed:   how much to take  how to take this  when to take this            CONTINUE taking these medications      albuterol-ipratropium 2.5 mg-0.5 mg/3 mL nebulizer solution  Commonly known as: DUO-NEB  Take 3 mLs by nebulization every 6 (six) hours as needed for Wheezing or Shortness of Breath.     allopurinoL 300 MG tablet  Commonly known as: ZYLOPRIM  Take 1 tablet (300 mg total) by mouth once daily.     ALPRAZolam 2 MG Tab  Commonly known as: XANAX  Take 1 tablet orally 2 times a day.     amiodarone 200 MG Tab  Commonly known as: PACERONE  take one tablet by mouth daily     apixaban 2.5 mg Tab  Commonly known as: ELIQUIS  take one tablet by mouth twice a day     aspirin 81 MG EC tablet  Commonly known as: ECOTRIN  Take 81 mg by mouth once daily.     atorvastatin 40 MG tablet  Commonly known as: LIPITOR  take one tablet by mouth in the evening     cetirizine 10 MG tablet  Commonly known as: ZyrTEC  Take 1 tablet (10 mg total) by mouth once daily.     DEXCOM G7 SENSOR Evon  Generic drug: blood-glucose sensor  change sensor every 10 days.     DOBUTamine 1,000 mg/250 mL (4,000 mcg/mL) infusion  Commonly known as: DOBUTREX  Inject 409 mcg/min into the vein continuous.     empagliflozin 25 mg tablet  Commonly known as: Jardiance  take one tablet by mouth daily     ferrous sulfate 325 (65 FE) MG EC tablet  Take 325 mg by mouth once daily.     fluticasone propionate 50 mcg/actuation nasal spray  Commonly known  as: FLONASE ALLERGY RELIEF  Use 2 sprays (100 mcg total) by Each Nostril route once daily.     glimepiride 1 MG tablet  Commonly known as: AMARYL  Take 1 tablet (1 mg total) by mouth before breakfast.     HYDROcodone-acetaminophen  mg per tablet  Commonly known as: NORCO  Take 1 tablet by mouth every 6 (six) hours as needed for Pain.     isosorbide-hydrALAZINE 20-37.5 mg 20-37.5 mg Tab  Commonly known as: BIDIL  Take 1 tablet by mouth 3 (three) times daily.     LIDOcaine 5 %  Commonly known as: LIDODERM  Place 1 patch onto the skin once daily. Remove & discard patch within 12 hours or as directed by MD.     magnesium oxide 400 mg (241.3 mg magnesium) tablet  Commonly known as: MAG-OX  take one tablet by mouth daily     metOLazone 5 MG tablet  Commonly known as: ZAROXOLYN  Take 1 tablet (5 mg total) by mouth daily as needed (Excess fluid).     metoprolol succinate 25 MG 24 hr tablet  Commonly known as: TOPROL-XL  Take 0.5 tablets (12.5 mg total) by mouth once daily.     nitroGLYCERIN 0.4 MG SL tablet  Commonly known as: NITROSTAT  Put 1 tab under the tongue every 5 minutes x 3 doses as needed for chest pain. Do not exceed 3 doses in 15 minutes. If no relief, go to ER.     ondansetron 8 MG Tbdl  Commonly known as: ZOFRAN-ODT  Dissolve 1 tablet (8 mg total) by mouth every 8 (eight) hours as needed (nausea).     pantoprazole 40 MG tablet  Commonly known as: PROTONIX  Take one tablet by mouth daily     polyethylene glycol 17 gram/dose powder  Commonly known as: MIRALAX  Take 17 g by mouth once daily.     pregabalin 50 MG capsule  Commonly known as: LYRICA  Take 1 capsule (50 mg total) by mouth every evening.     promethazine-codeine 6.25-10 mg/5 ml 6.25-10 mg/5 mL syrup  Commonly known as: PHENERGAN with CODEINE  Take 5 mLs by mouth every 6 (six) hours as needed for Cough.     SPIRIVA WITH HANDIHALER 18 mcg inhalation capsule  Generic drug: tiotropium  Inhale 1 capsule (18 mcg total) into the lungs once daily.      TYLENOL ARTHRITIS PAIN 650 mg Tbsr  Generic drug: acetaminophen  Take 1 tablet (650 mg total) by mouth every 8 (eight) hours.     VENTOLIN HFA 90 mcg/actuation inhaler  Generic drug: albuterol  Inhale 2 puffs into the lungs every 6 (six) hours as needed for Shortness of Breath or Wheezing.     VITAMIN B-12 1000 MCG tablet  Generic drug: cyanocobalamin  Take 1,000 mcg by mouth once daily.     VITAMIN D2 1,250 mcg (50,000 unit) capsule  Generic drug: ergocalciferol  Take 1 capsule orally once a  week            STOP taking these medications      fentaNYL 12 mcg/hr Pt72  Commonly known as: DURAGESIC              Indwelling Lines/Drains at time of discharge:   Lines/Drains/Airways       Peripherally Inserted Central Catheter Line  Duration             PICC Double Lumen 05/12/23 1534 right brachial 457 days                    Time spent on the discharge of patient: 45         Tres Alcala DO  Department of Hospital Medicine  Arie Vazquez - Cardiology Stepdown

## 2024-08-13 LAB
BACTERIA BLD CULT: NORMAL
BACTERIA BLD CULT: NORMAL

## 2024-08-16 ENCOUNTER — TELEPHONE (OUTPATIENT)
Dept: CARDIOLOGY | Facility: HOSPITAL | Age: 59
End: 2024-08-16
Payer: MEDICAID

## 2024-08-16 NOTE — TELEPHONE ENCOUNTER
Patient stated she is hearing a funny noise from her remote home monitor. She said it is a humming noise. She stated it may be a game at her house or a fast car speeding by her home. I informed her that her device does not make any noise. Patient stated understanding.   ----- Message from Clarice Goldberg sent at 8/16/2024  4:02 PM CDT -----  Regarding: Noise  Patient hearing a humming sound coming from her device. Please call back @ [phone]. Thank you Clarice

## 2024-08-19 ENCOUNTER — INFUSION (OUTPATIENT)
Dept: INFUSION THERAPY | Facility: HOSPITAL | Age: 59
End: 2024-08-19
Attending: INTERNAL MEDICINE
Payer: MEDICAID

## 2024-08-19 VITALS
TEMPERATURE: 97 F | RESPIRATION RATE: 22 BRPM | SYSTOLIC BLOOD PRESSURE: 137 MMHG | HEART RATE: 108 BPM | DIASTOLIC BLOOD PRESSURE: 85 MMHG

## 2024-08-19 DIAGNOSIS — E11.22 TYPE 2 DIABETES MELLITUS WITH STAGE 4 CHRONIC KIDNEY DISEASE, WITH LONG-TERM CURRENT USE OF INSULIN: ICD-10-CM

## 2024-08-19 DIAGNOSIS — N18.4 TYPE 2 DIABETES MELLITUS WITH STAGE 4 CHRONIC KIDNEY DISEASE, WITH LONG-TERM CURRENT USE OF INSULIN: ICD-10-CM

## 2024-08-19 DIAGNOSIS — Z79.4 TYPE 2 DIABETES MELLITUS WITH STAGE 4 CHRONIC KIDNEY DISEASE, WITH LONG-TERM CURRENT USE OF INSULIN: ICD-10-CM

## 2024-08-19 RX ORDER — ALPRAZOLAM 2 MG/1
2 TABLET ORAL 2 TIMES DAILY
Qty: 30 TABLET | Refills: 0 | Status: SHIPPED | OUTPATIENT
Start: 2024-08-19

## 2024-08-19 RX ORDER — SEMAGLUTIDE 1.34 MG/ML
1 INJECTION, SOLUTION SUBCUTANEOUS
Qty: 3 ML | Refills: 11 | OUTPATIENT
Start: 2024-08-19 | End: 2025-08-19

## 2024-08-19 NOTE — TELEPHONE ENCOUNTER
Care Due:                  Date            Visit Type   Department     Provider  --------------------------------------------------------------------------------                                EP -                              [facility]  Last Visit: 06-      CARE ([facility])   KENNY Ann                              EP -                              [facility]  Next Visit: 10-      CARE ([facility])   MEDICINE       Cristobal Ann                                                            Last  Test          Frequency    Reason                     Performed    Due Date  --------------------------------------------------------------------------------    Uric Acid...  12 months..  allopurinoL..............  11- 11-    [facility] Embedded Care Due Messages. Reference number: [id number].   8/19/2024 2:30:29 PM CDT

## 2024-08-19 NOTE — PROGRESS NOTES
Right upper arm PICC line dressing change done using sterile technique.  No signs of infection noted.  Stat lock device, bio patch and tegaderm dressing reapplied   Tolerated well

## 2024-08-22 RX ORDER — IPRATROPIUM BROMIDE AND ALBUTEROL SULFATE 2.5; .5 MG/3ML; MG/3ML
3 SOLUTION RESPIRATORY (INHALATION) EVERY 6 HOURS PRN
Qty: 90 ML | Refills: 2 | Status: SHIPPED | OUTPATIENT
Start: 2024-08-22

## 2024-08-22 NOTE — TELEPHONE ENCOUNTER
No care due was identified.  [facility] Embedded Care Due Messages. Reference number: [id number].   8/22/2024 12:10:25 PM CDT

## 2024-08-22 NOTE — TELEPHONE ENCOUNTER
LOV: 06/28/2024    Patient requesting refill of: albuterol-ipratropium (DUO-NEB) 2.5 mg-0.5 mg/3 mL nebulizer solution     Medication pended    Please advise

## 2024-08-26 ENCOUNTER — INFUSION (OUTPATIENT)
Dept: INFUSION THERAPY | Facility: HOSPITAL | Age: 59
End: 2024-08-26
Attending: STUDENT IN AN ORGANIZED HEALTH CARE EDUCATION/TRAINING PROGRAM
Payer: MEDICAID

## 2024-08-26 VITALS
SYSTOLIC BLOOD PRESSURE: 112 MMHG | TEMPERATURE: 97 F | RESPIRATION RATE: 24 BRPM | HEART RATE: 90 BPM | DIASTOLIC BLOOD PRESSURE: 79 MMHG

## 2024-08-27 ENCOUNTER — TELEPHONE (OUTPATIENT)
Dept: ORTHOPEDICS | Facility: CLINIC | Age: 59
End: 2024-08-27
Payer: MEDICAID

## 2024-08-27 ENCOUNTER — PATIENT MESSAGE (OUTPATIENT)
Dept: ORTHOPEDICS | Facility: CLINIC | Age: 59
End: 2024-08-27
Payer: MEDICAID

## 2024-08-27 ENCOUNTER — TELEPHONE (OUTPATIENT)
Dept: FAMILY MEDICINE | Facility: CLINIC | Age: 59
End: 2024-08-27
Payer: MEDICAID

## 2024-08-27 NOTE — TELEPHONE ENCOUNTER
----- Message from Tony Bello sent at 2024  2:30 PM CDT -----  Contact: pt  Hafsa Hawley  MRN: [id number]  : 1965  PCP: Cristobal Ann  Home Phone      [phone]  Work Phone      Not on file.  Mobile          [phone]      MESSAGE:     Pt called wanting to speak with nurse about having her information sent over to another hospital in [place]. Pt is not sure what to do.        Please advise  [phone]

## 2024-08-27 NOTE — TELEPHONE ENCOUNTER
Attempted to call pt in regards to message left. PT did not answer, could not LVM due to mailbox being full      ----- Message from Wendie Michel PA-C sent at 2024  7:11 AM CDT -----  Contact: Patient  She should follow up with PCP and/or neurology.  It has been 10 months since I have seen her, would need an appointment before prescribing her anything.  She could also go to ER if pain is severe.  ----- Message -----  From: Michelle Feldman MA  Sent: 2024   3:00 PM CDT  To: Wendie Michel PA-C      ----- Message -----  From: Santa Campos  Sent: 2024   2:54 PM CDT  To: Anand Escoto    Hafsa Hawley  MRN: [id number]  : 1965  PCP: Cristobal Ann  Home Phone      [phone]  Work Phone      Not on file.  Mobile          [phone]      MESSAGE: Patient says the pain and twitching in her legs has become uncontrollable and is hindering her everyday life at this point. She can't sleep and is having trouble walking as well. Please return this call to discuss some things she can possible do or any medication that can be prescribed to help her.     PHONE; [phone]

## 2024-08-28 DIAGNOSIS — R52 PAIN: ICD-10-CM

## 2024-08-28 RX ORDER — HYDROCODONE BITARTRATE AND ACETAMINOPHEN 10; 325 MG/1; MG/1
1 TABLET ORAL EVERY 6 HOURS PRN
Qty: 120 TABLET | Refills: 0 | Status: SHIPPED | OUTPATIENT
Start: 2024-08-28

## 2024-08-29 ENCOUNTER — LAB VISIT (OUTPATIENT)
Dept: LAB | Facility: HOSPITAL | Age: 59
End: 2024-08-29
Attending: INTERNAL MEDICINE
Payer: MEDICAID

## 2024-08-29 ENCOUNTER — PATIENT MESSAGE (OUTPATIENT)
Dept: RHEUMATOLOGY | Facility: CLINIC | Age: 59
End: 2024-08-29
Payer: MEDICAID

## 2024-08-29 DIAGNOSIS — N18.4 CHRONIC KIDNEY DISEASE, STAGE IV (SEVERE): ICD-10-CM

## 2024-08-29 LAB
ALBUMIN SERPL BCP-MCNC: 3.4 G/DL (ref 3.5–5.2)
ANION GAP SERPL CALC-SCNC: 13 MMOL/L (ref 8–16)
ANISOCYTOSIS BLD QL SMEAR: ABNORMAL
BASOPHILS # BLD AUTO: 0.05 K/UL (ref 0–0.2)
BASOPHILS NFR BLD: 0.9 % (ref 0–1.9)
BUN SERPL-MCNC: 86 MG/DL (ref 6–20)
CALCIUM SERPL-MCNC: 9.4 MG/DL (ref 8.7–10.5)
CHLORIDE SERPL-SCNC: 105 MMOL/L (ref 95–110)
CO2 SERPL-SCNC: 25 MMOL/L (ref 23–29)
CREAT SERPL-MCNC: 3.2 MG/DL (ref 0.5–1.4)
DIFFERENTIAL METHOD BLD: ABNORMAL
EOSINOPHIL # BLD AUTO: 0.1 K/UL (ref 0–0.5)
EOSINOPHIL NFR BLD: 1.7 % (ref 0–8)
ERYTHROCYTE [DISTWIDTH] IN BLOOD BY AUTOMATED COUNT: 32.4 % (ref 11.5–14.5)
EST. GFR  (NO RACE VARIABLE): 16 ML/MIN/1.73 M^2
GLUCOSE SERPL-MCNC: 175 MG/DL (ref 70–110)
HCT VFR BLD AUTO: 39.5 % (ref 37–48.5)
HGB BLD-MCNC: 11.1 G/DL (ref 12–16)
HOWELL-JOLLY BOD BLD QL SMEAR: ABNORMAL
HYPOCHROMIA BLD QL SMEAR: ABNORMAL
IMM GRANULOCYTES # BLD AUTO: 0.04 K/UL (ref 0–0.04)
IMM GRANULOCYTES NFR BLD AUTO: 0.8 % (ref 0–0.5)
LYMPHOCYTES # BLD AUTO: 0.8 K/UL (ref 1–4.8)
LYMPHOCYTES NFR BLD: 14.1 % (ref 18–48)
MCH RBC QN AUTO: 18.3 PG (ref 27–31)
MCHC RBC AUTO-ENTMCNC: 28.1 G/DL (ref 32–36)
MCV RBC AUTO: 65 FL (ref 82–98)
MONOCYTES # BLD AUTO: 0.5 K/UL (ref 0.3–1)
MONOCYTES NFR BLD: 9.9 % (ref 4–15)
NEUTROPHILS # BLD AUTO: 3.9 K/UL (ref 1.8–7.7)
NEUTROPHILS NFR BLD: 72.6 % (ref 38–73)
NRBC BLD-RTO: 10 /100 WBC
OVALOCYTES BLD QL SMEAR: ABNORMAL
PHOSPHATE SERPL-MCNC: 4.7 MG/DL (ref 2.7–4.5)
PLATELET # BLD AUTO: 127 K/UL (ref 150–450)
PLATELET BLD QL SMEAR: ABNORMAL
PMV BLD AUTO: ABNORMAL FL (ref 9.2–12.9)
POIKILOCYTOSIS BLD QL SMEAR: ABNORMAL
POLYCHROMASIA BLD QL SMEAR: ABNORMAL
POTASSIUM SERPL-SCNC: 4.9 MMOL/L (ref 3.5–5.1)
PTH-INTACT SERPL-MCNC: 724.6 PG/ML (ref 9–77)
RBC # BLD AUTO: 6.07 M/UL (ref 4–5.4)
SCHISTOCYTES BLD QL SMEAR: ABNORMAL
SODIUM SERPL-SCNC: 143 MMOL/L (ref 136–145)
SPHEROCYTES BLD QL SMEAR: ABNORMAL
TARGETS BLD QL SMEAR: ABNORMAL
WBC # BLD AUTO: 5.33 K/UL (ref 3.9–12.7)

## 2024-08-29 PROCEDURE — 36415 COLL VENOUS BLD VENIPUNCTURE: CPT | Performed by: INTERNAL MEDICINE

## 2024-08-29 PROCEDURE — 80069 RENAL FUNCTION PANEL: CPT | Performed by: INTERNAL MEDICINE

## 2024-08-29 PROCEDURE — 85025 COMPLETE CBC W/AUTO DIFF WBC: CPT | Performed by: INTERNAL MEDICINE

## 2024-08-29 PROCEDURE — 83970 ASSAY OF PARATHORMONE: CPT | Performed by: INTERNAL MEDICINE

## 2024-09-04 ENCOUNTER — INFUSION (OUTPATIENT)
Dept: INFUSION THERAPY | Facility: HOSPITAL | Age: 59
End: 2024-09-04
Attending: INTERNAL MEDICINE
Payer: MEDICAID

## 2024-09-04 VITALS
HEART RATE: 70 BPM | TEMPERATURE: 98 F | DIASTOLIC BLOOD PRESSURE: 82 MMHG | SYSTOLIC BLOOD PRESSURE: 126 MMHG | RESPIRATION RATE: 20 BRPM

## 2024-09-04 DIAGNOSIS — I42.9 CARDIOMYOPATHY, UNSPECIFIED TYPE: Primary | ICD-10-CM

## 2024-09-04 LAB
ANION GAP SERPL CALC-SCNC: 16 MMOL/L (ref 8–16)
BNP SERPL-MCNC: 3000 PG/ML (ref 0–99)
BUN SERPL-MCNC: 87 MG/DL (ref 6–20)
CALCIUM SERPL-MCNC: 9.5 MG/DL (ref 8.7–10.5)
CHLORIDE SERPL-SCNC: 101 MMOL/L (ref 95–110)
CO2 SERPL-SCNC: 27 MMOL/L (ref 23–29)
CREAT SERPL-MCNC: 2.9 MG/DL (ref 0.5–1.4)
EST. GFR  (NO RACE VARIABLE): 18 ML/MIN/1.73 M^2
GLUCOSE SERPL-MCNC: 142 MG/DL (ref 70–110)
POTASSIUM SERPL-SCNC: 3.6 MMOL/L (ref 3.5–5.1)
SODIUM SERPL-SCNC: 144 MMOL/L (ref 136–145)

## 2024-09-04 PROCEDURE — 36415 COLL VENOUS BLD VENIPUNCTURE: CPT

## 2024-09-04 PROCEDURE — 80048 BASIC METABOLIC PNL TOTAL CA: CPT | Performed by: STUDENT IN AN ORGANIZED HEALTH CARE EDUCATION/TRAINING PROGRAM

## 2024-09-04 PROCEDURE — 83880 ASSAY OF NATRIURETIC PEPTIDE: CPT | Performed by: STUDENT IN AN ORGANIZED HEALTH CARE EDUCATION/TRAINING PROGRAM

## 2024-09-04 PROCEDURE — 36415 COLL VENOUS BLD VENIPUNCTURE: CPT | Performed by: STUDENT IN AN ORGANIZED HEALTH CARE EDUCATION/TRAINING PROGRAM

## 2024-09-04 NOTE — PROGRESS NOTES
Right upper arm PICC line dressing change done using sterile technique.  No signs of infection noted  Bio patch applied with tegaderm dressing and stat lock device  Tolerated well

## 2024-09-05 ENCOUNTER — LAB VISIT (OUTPATIENT)
Dept: LAB | Facility: HOSPITAL | Age: 59
End: 2024-09-05
Attending: INTERNAL MEDICINE
Payer: MEDICAID

## 2024-09-05 ENCOUNTER — OFFICE VISIT (OUTPATIENT)
Dept: TRANSPLANT | Facility: CLINIC | Age: 59
End: 2024-09-05
Payer: MEDICAID

## 2024-09-05 VITALS
HEIGHT: 66 IN | DIASTOLIC BLOOD PRESSURE: 99 MMHG | SYSTOLIC BLOOD PRESSURE: 134 MMHG | WEIGHT: 182.75 LBS | HEART RATE: 101 BPM | BODY MASS INDEX: 29.37 KG/M2

## 2024-09-05 DIAGNOSIS — I50.9 CONGESTIVE HEART FAILURE, UNSPECIFIED HF CHRONICITY, UNSPECIFIED HEART FAILURE TYPE: Primary | ICD-10-CM

## 2024-09-05 DIAGNOSIS — I50.42 CHRONIC COMBINED SYSTOLIC AND DIASTOLIC HEART FAILURE: Chronic | ICD-10-CM

## 2024-09-05 DIAGNOSIS — I50.84 END STAGE HEART FAILURE: Primary | Chronic | ICD-10-CM

## 2024-09-05 DIAGNOSIS — I42.8 NICM (NONISCHEMIC CARDIOMYOPATHY): Chronic | ICD-10-CM

## 2024-09-05 DIAGNOSIS — I50.9 CONGESTIVE HEART FAILURE, UNSPECIFIED HF CHRONICITY, UNSPECIFIED HEART FAILURE TYPE: ICD-10-CM

## 2024-09-05 DIAGNOSIS — Z95.810 ICD (IMPLANTABLE CARDIOVERTER-DEFIBRILLATOR) IN PLACE: Chronic | ICD-10-CM

## 2024-09-05 LAB
ALBUMIN SERPL BCP-MCNC: 3.4 G/DL (ref 3.5–5.2)
ALP SERPL-CCNC: 53 U/L (ref 55–135)
ALT SERPL W/O P-5'-P-CCNC: 14 U/L (ref 10–44)
ANION GAP SERPL CALC-SCNC: 14 MMOL/L (ref 8–16)
AST SERPL-CCNC: 18 U/L (ref 10–40)
BILIRUB SERPL-MCNC: 3.2 MG/DL (ref 0.1–1)
BNP SERPL-MCNC: 3197 PG/ML (ref 0–99)
BUN SERPL-MCNC: 80 MG/DL (ref 6–20)
CALCIUM SERPL-MCNC: 9.1 MG/DL (ref 8.7–10.5)
CHLORIDE SERPL-SCNC: 101 MMOL/L (ref 95–110)
CO2 SERPL-SCNC: 27 MMOL/L (ref 23–29)
CREAT SERPL-MCNC: 2.8 MG/DL (ref 0.5–1.4)
EST. GFR  (NO RACE VARIABLE): 18.9 ML/MIN/1.73 M^2
GLUCOSE SERPL-MCNC: 187 MG/DL (ref 70–110)
MAGNESIUM SERPL-MCNC: 2 MG/DL (ref 1.6–2.6)
POTASSIUM SERPL-SCNC: 3.6 MMOL/L (ref 3.5–5.1)
PROT SERPL-MCNC: 6.1 G/DL (ref 6–8.4)
SODIUM SERPL-SCNC: 142 MMOL/L (ref 136–145)
TSH SERPL DL<=0.005 MIU/L-ACNC: 2.12 UIU/ML (ref 0.4–4)

## 2024-09-05 PROCEDURE — 99215 OFFICE O/P EST HI 40 MIN: CPT | Mod: PBBFAC | Performed by: INTERNAL MEDICINE

## 2024-09-05 PROCEDURE — 36415 COLL VENOUS BLD VENIPUNCTURE: CPT | Performed by: INTERNAL MEDICINE

## 2024-09-05 PROCEDURE — 80053 COMPREHEN METABOLIC PANEL: CPT | Performed by: INTERNAL MEDICINE

## 2024-09-05 PROCEDURE — 84443 ASSAY THYROID STIM HORMONE: CPT | Performed by: INTERNAL MEDICINE

## 2024-09-05 PROCEDURE — 83880 ASSAY OF NATRIURETIC PEPTIDE: CPT | Performed by: INTERNAL MEDICINE

## 2024-09-05 PROCEDURE — 99999 PR PBB SHADOW E&M-EST. PATIENT-LVL V: CPT | Mod: PBBFAC,,, | Performed by: INTERNAL MEDICINE

## 2024-09-05 PROCEDURE — 83735 ASSAY OF MAGNESIUM: CPT | Performed by: INTERNAL MEDICINE

## 2024-09-05 PROCEDURE — 83880 ASSAY OF NATRIURETIC PEPTIDE: CPT | Mod: 91 | Performed by: INTERNAL MEDICINE

## 2024-09-05 RX ORDER — POTASSIUM CHLORIDE 20 MEQ/1
20 TABLET, EXTENDED RELEASE ORAL 2 TIMES DAILY
Qty: 60 TABLET | Refills: 5 | Status: SHIPPED | OUTPATIENT
Start: 2024-09-05

## 2024-09-05 RX ORDER — BUMETANIDE 2 MG/1
4 TABLET ORAL 2 TIMES DAILY
Qty: 120 TABLET | Refills: 2 | Status: SHIPPED | OUTPATIENT
Start: 2024-09-05

## 2024-09-05 NOTE — PROGRESS NOTES
Advanced Heart Failure and Transplantation Clinic Follow up.      Attending Physician: Burke Camilo MD.  The patient's last visit with me was on 5/29/2023.         JENNIFER Hawley is a 59 y.o. year old Black or  female who has presented to be evaluated as a potential heart transplant recipient.       58 yo BF with CHF since 2013 due to NICM, pulmonary hypertension, DM, HTN, permanent AF and ICD.  She was previously evaluated by our team and presented to committee on 3/9/22.  At the time she was declined for OHT or LVAD due to renal function, lung function and difficulty consistently following up with the team.   She returns for f/u visit as undergoing evaluation for heart-kidney transplant.  She does not have her medications with her.  She is not have a medication list of her home medicines with her.  She does not know any of her medications by name except Lasix.  Was described as Lasix 80 mg twice daily but she reports she takes the morning dose but only use the evening dose if she feels the need to do so.  I asked her how she knows without a scale she said she just bases this on her abdominal bloating and any edema.  She reports that her scale is broken and she has not gotten a new 1 so she has no daily weights to report.  She is accompanied by her granddaughter.  She tells me if she was approved for transplant her primary caregiver will be Jeanie Jaimes (sister and Laura Jaimes her niece.  She is going to stay in Laura's house after her transplant, if approved.     She saw Dr. Guardado in January 2023 at which time sent to the ED and admitted to [facility] 1/3/23.  She was treated for ADHF with IV lasix 80 mg IV BID and [facility] consulted.  At that time it was noted that she had run out of metoprolol for 1 week PTA.  She was read[facility] to [facility] few weeks later with ADHF and at discharge told to see [facility].  She  came Feb 3, 2023 to see Dr. Guardado but was sent to ED with nausea and vomiting.  There treated with IV lasix and sent home from ED.  She comes today for f/u visit.  Of note per Dr. Guardado she was seen by Pulm in Jan 2023 and they felt that there was no overt evidence of ILD or COPD on PFTs and CT chest.  They felt that the etiology of her chronic hypoxic and hypercapnic respiratory failure was not clear.     I saw her February 16, 2023 at which time she reported that her shortness of breath was stable and described NYHA class 3 symptoms.  At this visit I was very concerned that her lung disease is her limiting factor based upon the desaturation with exercise not just today but also on the day of her CPX.  On the CPX in December her breathing reserve was borderline reduced.  An elevated VCO2 slope can occur with lung disease as well as heart failure.  I suspect she has a combination of lung disease and heart failure that is limiting her ability to be physically active and resulting in her high level of symptomology.  I note on her problem list a diagnosis of chronic respiratory hypoxic and hypercapnic respiratory failure.  I did not see a blood gas to confirm this diagnosis so requested that one be obtained.     She saw Dr. Chacko February 27, 2023 note not yet signed but at end of current note he mentions his concern regarding PFT's.  On 2/27/2023 PFT's read as reduced FEV1/SVC ratio (66%) and low FEF 25-75 suggest mild obstruction on spirometry. Lung volumes show moderate restriction is present. Overall spirometry shows severe ventilatory impairment. DLCO is moderately decreased. MVV is severely decreased. On 2/27/2023 ABG 7.46/pCO2 was 39 and pO2 was 73 and these ABG's do not reflect the previously described chronic hypoxic and hypercapnic respiratory failure.     She sees Dr. Ángel Elizabeth as Pulm at [facility].  His notes describe on PFTs December 22, 2021 severe restriction with moderately reduced DLCO.  He stated on  "his note of December 8, 2022 that she was on home oxygen felt she was benefitting from it.  However he does concludes that there is no evidence of ILD or COPD on PFTs/CT chest.   On August 1, 2022 he documented in an addendum that was signed March 24, 2022 that the patient had chronic respiratory failure with hypoxia and hypercapnia:  Patient is on home oxygen and is benefitting from it.   I found a note from February 16, 2022 under assessment he describes chronic respiratory failure with hypoxia and hypercapnia.  He reports that she was trying to wean herself off oxygen."     Her PFTs today 03/15/2023 demonstrates severe restriction and severely reduced MVV there is also the possibility of mild obstruction per report.  In February 27,2023 she has a blood gas reportedly on room air that is normal without CO2 retention (7.46/39/73).     She was presented to selection committee again on 3/29/23 and was declined for Heart Transplant listing due to poor medical adherence, declined pulmonary function, and declined renal function.       Her symptoms of heart failure include shortness of breath and sleeping on 2 pillows.  Though she has deteriorated regarding renal function she feels that her shortness of breath is a little bit better today than when she left the hospital last week.     Today May 29, 2023, patient comes after 4 day hospitalization for volume overload. Patient was started in low dose dobutamine. She is no longer under consideration for any advanced cardiac therapies.    September 5, 2024: she has not had success on going to another center for evaluation for heart/kidney transplant. She continues to struggle with HF, multiple readmissions and poor functional status.       Review of Systems   Constitutional:  Positive for activity change, appetite change, fatigue and unexpected weight change. Negative for chills, diaphoresis and fever.   HENT:  Negative for nasal congestion, rhinorrhea and sore throat.  "   Eyes:  Negative for visual disturbance.   Respiratory:  Positive for cough, chest tightness and shortness of breath.    Cardiovascular:  Negative for chest pain and leg swelling.   Gastrointestinal:  Positive for abdominal distention. Negative for abdominal pain, diarrhea, nausea and vomiting.   Genitourinary:  Negative for difficulty urinating, dysuria and hematuria.   Integumentary:  Negative for rash.   Neurological:  Negative for seizures, syncope and light-headedness.   Psychiatric/Behavioral:  Negative for agitation and hallucinations.         Past Medical History:   Diagnosis Date    Allergy     Anemia     Arthritis     Atrial fibrillation     OAC    BMI 32.0-32.9,adult 02/22/2023    Chronic respiratory failure with hypoxia, on home oxygen therapy     2L with activity, off at rest.  Per Pulm  no overt evidence of ILD or COPD on PFTs and CT to explain O2 needs.    CKD (chronic kidney disease), stage IV 05/08/2018    Congestive heart failure     s/p AICD placement,    Deep vein thrombosis     Depression     elevated bilirubin d/t Gilbert's syndrome     confirmed by [employer] genetic testing, evaluated by hepatology    Encounter for blood transfusion     GERD (gastroesophageal reflux disease)     Hypertension     Pheochromocytoma, malignant     Right kidney mass     Sleep apnea     Thalassemia trait, alpha     Thyroid disease     Type 2 diabetes mellitus with hyperglycemia, without long-term current use of insulin 08/13/2020        Past Surgical History:   Procedure Laterality Date    ANGIOGRAM, ABDOMINAL AORTA Right 04/15/2024    APPENDECTOMY      BONE MARROW BIOPSY      CARDIAC DEFIBRILLATOR PLACEMENT Left 12/2016    CARDIAC ELECTROPHYSIOLOGY MAPPING AND ABLATION      CARDIAC ELECTROPHYSIOLOGY MAPPING AND ABLATION      COLONOSCOPY N/A 05/06/2022    Procedure: COLONOSCOPY;  Surgeon: Arely Betancourt MD;  Location: [address]);  Service: Endoscopy;  Laterality: N/A;  heart transplant candidate/ EF 25% -  2nd floor/ defib - Biotronik - ER[facility]  Eliquis - per Dr. Cortez with [facility], Pt ok to hold Eliquis x 2 days prior-see media tab-outside correspondence dated 21  - [facility]  verbal instructions/portal instructions/email instructions - s    EYE SURGERY      due to running tears    FRACTURE SURGERY Left     hand 5th digit    HYSTERECTOMY      KNEE SURGERY Left 2016    hematoma    LIVER BIOPSY  10/24/2018    Minimal steatosis, predominantly macrovesicular, 1%, Minimal nonspecific portal inflammation, no fibrosis. No findings on biopsy to explain elevated bilirubin levels. Could be d/t Gilbert's =?- hemolysis    RIGHT HEART CATHETERIZATION Right 2021    Procedure: INSERTION, CATHETER, RIGHT HEART;  Surgeon: Irving Cardenas MD;  Location: [facility] CATH LAB;  Service: Cardiology;  Laterality: Right;    RIGHT HEART CATHETERIZATION Right 2022    Procedure: INSERTION, CATHETER, RIGHT HEART;  Surgeon: Burke Camilo MD;  Location: [facility] CATH LAB;  Service: Cardiology;  Laterality: Right;    RIGHT HEART CATHETERIZATION Right 2023    Procedure: INSERTION, CATHETER, RIGHT HEART;  Surgeon: Katie Liriano DO;  Location: [facility] CATH LAB;  Service: Cardiology;  Laterality: Right;    TRANSJUGULAR BIOPSY OF LIVER N/A 10/24/2018    Procedure: BIOPSY, LIVER, TRANSJUGULAR APPROACH;  Surgeon: Carmen Diagnostic Provider;  Location: [facility] OR [address] [place] [address];  Service: Radiology;  Laterality: N/A;        Family History   Problem Relation Name Age of Onset    Cancer Mother          pancreatic CA early 50's    Heart disease Father           MI in late 50's    Hypertension Father      Heart attack Father      Heart disease Sister      Heart disease Brother      Cirrhosis Brother          alcoholic    Heart disease Sister      Heart disease Brother      Hypertension Brother      Diabetes Brother          Review of patient's allergies indicates:   Allergen Reactions    Penicillins Hives and Other (See Comments)    Iodinated  "contrast media Nausea And Vomiting    Oxycodone-acetaminophen Other (See Comments)     Nausea, Dizziness, Anxiety.  "I don't like how it makes me feel."   Given Hydromorphone 0.5mg IVP  Without problems.  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 Metabolism of other drugs not listed here may also be impacted by UGT1A1 enzyme activity.       Tramadol Nausea And Vomiting and Other (See Comments)     Other reaction(s): Other (See Comments)    Valsartan Other (See Comments)        Current Outpatient Medications   Medication Instructions    albuterol-ipratropium (DUO-NEB) 2.5 mg-0.5 mg/3 mL nebulizer solution 3 mLs, Nebulization, Every 6 hours PRN    allopurinoL (ZYLOPRIM) 300 mg, Oral, Daily    ALPRAZolam (XANAX) 2 MG Tab Take 1 tablet orally 2 times a day.    amiodarone (PACERONE) 200 MG Tab take one tablet by mouth daily    apixaban (ELIQUIS) 2.5 mg Tab take one tablet by mouth twice a day    aspirin (ECOTRIN) 81 mg, Oral, Daily    atorvastatin (LIPITOR) 40 MG tablet take one tablet by mouth in the evening    blood-glucose sensor (DEXCOM G7 SENSOR) Evon change sensor every 10 days.    bumetanide (BUMEX) 2 mg, Oral, 2 times daily    cetirizine (ZYRTEC) 10 mg, Oral, Daily    cyanocobalamin (VITAMIN B-12) 1,000 mcg, Oral, Daily    DOBUTamine (DOBUTREX) 1,000 mg/250 mL (4,000 mcg/mL) infusion 5 mcg/kg/min, Intravenous, Continuous    empagliflozin (JARDIANCE) 25 mg tablet take " one tablet by mouth daily    ergocalciferol (VITAMIN D2) 50,000 unit Cap Take 1 capsule orally once a  week    ferrous sulfate 325 mg, Oral, Daily    fluticasone propionate (FLONASE ALLERGY RELIEF) 50 mcg/actuation nasal spray Use 2 sprays (100 mcg total) by Each Nostril route once daily.    glimepiride (AMARYL) 1 mg, Oral, Before breakfast    HYDROcodone-acetaminophen (NORCO)  mg per tablet 1 tablet, Oral, Every 6 hours PRN    isosorbide-hydrALAZINE 20-37.5 mg (BIDIL) 20-37.5 mg Tab 1 tablet, Oral, 3 times daily    LIDOcaine (LIDODERM) 5 % Place 1 patch onto the skin once daily. Remove & discard patch within 12 hours or as directed by MD.    magnesium oxide (MAG-OX) 400 mg (241.3 mg magnesium) tablet take one tablet by mouth daily    metOLazone (ZAROXOLYN) 5 mg, Oral, Daily PRN    metoprolol succinate (TOPROL-XL) 12.5 mg, Oral, Daily    nitroGLYCERIN (NITROSTAT) 0.4 MG SL tablet Put 1 tab under the tongue every 5 minutes x 3 doses as needed for chest pain. Do not exceed 3 doses in 15 minutes. If no relief, go to ER.    ondansetron (ZOFRAN-ODT) 8 MG TbDL Dissolve 1 tablet (8 mg total) by mouth every 8 (eight) hours as needed (nausea).    pantoprazole (PROTONIX) 40 MG tablet Take one tablet by mouth daily    polyethylene glycol (MIRALAX) 17 g, Oral, Daily    pregabalin (LYRICA) 50 mg, Oral, Nightly    promethazine-codeine 6.25-10 mg/5 ml (PHENERGAN WITH CODEINE) 6.25-10 mg/5 mL syrup 5 mLs, Oral, Every 6 hours PRN    SPIRIVA WITH HANDIHALER 18 mcg, Inhalation, Daily    TYLENOL ARTHRITIS PAIN 650 mg, Oral, Every 8 hours    VENTOLIN HFA 90 mcg/actuation inhaler 2 puffs, Inhalation, Every 6 hours PRN        There were no vitals filed for this visit.     Wt Readings from Last 3 Encounters:   09/03/24 85.1 kg (187 lb 8 oz)   08/11/24 79.9 kg (176 lb 2.4 oz)   07/26/24 81.4 kg (179 lb 7.3 oz)     Temp Readings from Last 3 Encounters:   09/04/24 98.2 °F (36.8 °C)   09/03/24 98.8 °F (37.1 °C) (Oral)   08/26/24 97.1 °F  "(36.2 °C)     BP Readings from Last 3 Encounters:   09/04/24 126/82   09/03/24 113/77   08/26/24 112/79     Pulse Readings from Last 3 Encounters:   09/04/24 70   09/03/24 102   08/26/24 90        There is no height or weight on file to calculate BMI. Estimated body surface area is 1.99 meters squared as calculated from the following:    Height as of 9/3/24: 5' 6" (1.676 m).    Weight as of 9/3/24: 85.1 kg (187 lb 8 oz).     Physical Exam  Constitutional:       Appearance: She is well-developed.   HENT:      Head: Normocephalic and atraumatic.      Right Ear: External ear normal.      Left Ear: External ear normal.   Eyes:      Conjunctiva/sclera: Conjunctivae normal.      Pupils: Pupils are equal, round, and reactive to light.   Neck:      Vascular: Hepatojugular reflux and JVD present.      Comments: JVP 18 cmH20  Cardiovascular:      Rate and Rhythm: Normal rate and regular rhythm.      Pulses: Intact distal pulses.      Heart sounds: S1 normal and S2 normal. No murmur heard.     No friction rub. No gallop.   Pulmonary:      Effort: Pulmonary effort is normal.      Breath sounds: Normal breath sounds.   Abdominal:      General: Bowel sounds are normal. There is no distension.      Palpations: Abdomen is soft.      Tenderness: There is no abdominal tenderness. There is no guarding or rebound.   Musculoskeletal:      Cervical back: Normal range of motion and neck supple.      Right lower leg: No edema.      Left lower leg: No edema.   Neurological:      Mental Status: She is alert and oriented to person, place, and time.          Lab Results   Component Value Date    BNP 3,000 (H) 09/04/2024     09/04/2024    K 3.6 09/04/2024    MG 2.0 08/11/2024     09/04/2024    CO2 27 09/04/2024    BUN 87 (H) 09/04/2024    CREATININE 2.9 (H) 09/04/2024     (H) 09/04/2024    HGBA1C 5.2 07/24/2024    AST 16 08/11/2024    ALT 12 08/11/2024    ALBUMIN 3.4 (L) 08/29/2024    PROT 6.4 08/11/2024    BILITOT 2.4 (H) " 08/11/2024    WBC 5.33 08/29/2024    HGB 11.1 (L) 08/29/2024    HCT 39.5 08/29/2024    HCT 45 12/07/2021     (L) 08/29/2024    INR 1.0 05/06/2024     (H) 01/04/2023    TSH 1.308 08/07/2023    CHOL 136 01/04/2023    HDL 63 01/04/2023    LDLCALC 57.8 (L) 01/04/2023    TRIG 76 01/04/2023    J2CAIVG 8.4 09/11/2017    FREET4 1.07 05/09/2018       @LABRCNTIP(cpk,cpkmb,troponini,mb)@     No results found for this visit on 09/05/24.       Results for orders placed during the hospital encounter of 05/05/24    Echo    Interpretation Summary    Left Ventricle: The left ventricle is severely dilated. Severely increased ventricular mass. Normal wall thickness. There is severe eccentric hypertrophy. Severe global hypokinesis present. There is severely reduced systolic function with a visually estimated ejection fraction of 10 -15%. Grade II diastolic dysfunction. Elevated left ventricular filling pressure. No thrombus present.    Right Ventricle: Normal right ventricular cavity size. Wall thickness is normal. Right ventricle wall motion  is normal. Systolic function is mildly reduced. Pacemaker lead present in the ventricle.    Left Atrium: Left atrium is severely dilated.    Right Atrium: Normal right atrial size.    Aortic Valve: The aortic valve is a trileaflet valve. There is mild annular calcification present. There is mild aortic regurgitation.    Mitral Valve: There is moderate to severe regurgitation with an eccentric jet and a wall hugging jet.    Tricuspid Valve: There is mild regurgitation.    Pulmonic Valve: There is mild regurgitation.    Aorta: Aortic root is normal in size measuring 2.75 cm. Ascending aorta is normal measuring 3.49 cm.    Pulmonary Artery: The estimated pulmonary artery systolic pressure is 44 mmHg.    IVC/SVC: Normal venous pressure at 3 mmHg.        Results for orders placed during the hospital encounter of 03/26/23    Cardiac catheterization    Conclusion  Summary  Filling  pressures: RA 9, PCWP 16  PA 58/29, mean PA pressure 39 mmHg  PA saturation 45%, Ao saturation 90% on RA  PVR 5.6 BOLDEN, consistent with pre and post-capillary pulmonary hypertension  Gillian CO/CI: 4.1/2.03  SVR: 1151  BP 88/58 , MAP 68, HR 75 SR  Hb 10.7  Jayson 3.2  No Nipride challenge performed for PA pressures and elevated PVR given BP         Assessment and Plan:  End stage heart failure  -     Ambulatory referral/consult to Transplant, Heart  -     Basic Metabolic Panel; Future; Expected date: 11/05/2024  -     BNP; Future; Expected date: 11/05/2024    NICM (nonischemic cardiomyopathy)    ICD (implantable cardioverter-defibrillator) in place    Chronic combined systolic and diastolic heart failure    Other orders  -     bumetanide (BUMEX) 2 MG tablet; Take 2 tablets (4 mg total) by mouth 2 (two) times daily.  Dispense: 120 tablet; Refill: 2  -     potassium chloride SA (K-DUR,KLOR-CON) 20 MEQ tablet; Take 1 tablet (20 mEq total) by mouth 2 (two) times daily.  Dispense: 60 tablet; Refill: 5          Chronic systolic HF, NYHA class III, stage D. In home dobutamine 5 mcg/kg/min.  Etiology: NICM.  Devices: ICD.  Hemodynamic status: warm, normotensive, severely hypervolemic.  Clinical course: multiple HF hospitalizations.  Plan:  -I encouraged her to look for second evaluation at other centers as she is no longer under consideration for any advanced cardiac therapies. She is sicker than last time (more malnourished and less functional) I saw her. She faces difficulties with not having insurance for out of state evaluation or treatment.  -Increase bumex from 2 mg BID to 4 mg twice a day.  -F/u blood test Tuesday am.  -Call us Tuesday pm to report weight and symptoms.  -F/u in 2 months with labs.

## 2024-09-05 NOTE — LETTER
September 5, 2024        Benson Cortez  [address] [facility] [place] [address]  Phone: [phone]  Fax: [phone]     Krysta Tony  [address] [facility] [address]  Phone: [phone]  Fax: [phone]     Andrea Pacheco  [address] [facility]  [address]  [place] [address]  Phone: [phone]  Fax: [phone]             [id number]  [address]  [place] [address]  Phone: [phone]   Patient: Hafsa Hawley   MR Number: [id number]   YOB: 1965   Date of Visit: 9/5/2024       Dear Dr. Andrea Pacheco, Benson Cortez, Krysta Tony    Thank you for referring Hafsa Hawley to me for evaluation. Attached you will find relevant portions of my assessment and plan of care.    If you have questions, please do not hesitate to call me. I look forward to following Hafsa Hawley along with you.    Sincerely,    Burke Camilo MD    Enclosure    If you would like to receive this communication electronically, please contact [email or web address] or [phone] to request [employer] access.    [employer] is a tool which provides read-only access to select patient information with whom you have a relationship. Its easy to use and provides real time access to review your patients record including encounter summaries, notes, results, and demographic information.    If you feel you have received this communication in error or would no longer like to receive these types of communications, please e-mail [email or web address]

## 2024-09-05 NOTE — PATIENT INSTRUCTIONS
You have extra fluid on you.  Please adhere to a low sodium diet (no more than 1.5 grams of sodium in 24h).  3.   Follow fluid restriction of  2. no more than 1.5 liters in 24 hours..   4. Increase bumex from 2 mg BID to 4 mg twice a day.  5. F/u blood test Tuesday am.  6. Call us Tuesday pm to report weight and symptoms.  7. F/u in 2 months with labs.

## 2024-09-06 LAB — NT-PROBNP SERPL IA-MCNC: ABNORMAL PG/ML

## 2024-09-09 ENCOUNTER — INFUSION (OUTPATIENT)
Dept: INFUSION THERAPY | Facility: HOSPITAL | Age: 59
End: 2024-09-09
Attending: INTERNAL MEDICINE
Payer: MEDICAID

## 2024-09-09 VITALS
SYSTOLIC BLOOD PRESSURE: 118 MMHG | TEMPERATURE: 97 F | RESPIRATION RATE: 24 BRPM | HEART RATE: 108 BPM | DIASTOLIC BLOOD PRESSURE: 80 MMHG

## 2024-09-09 NOTE — PROGRESS NOTES
Right upper arm PICC line dressing change done using sterile technique.  Stat lock device, bio patch device and tegaderm dressing applied.  Tolerated well

## 2024-09-10 ENCOUNTER — TELEPHONE (OUTPATIENT)
Dept: TRANSPLANT | Facility: CLINIC | Age: 59
End: 2024-09-10
Payer: MEDICAID

## 2024-09-16 ENCOUNTER — INFUSION (OUTPATIENT)
Dept: INFUSION THERAPY | Facility: HOSPITAL | Age: 59
End: 2024-09-16
Attending: INTERNAL MEDICINE
Payer: MEDICAID

## 2024-09-16 ENCOUNTER — LAB VISIT (OUTPATIENT)
Dept: LAB | Facility: HOSPITAL | Age: 59
End: 2024-09-16
Attending: INTERNAL MEDICINE
Payer: MEDICAID

## 2024-09-16 ENCOUNTER — TELEPHONE (OUTPATIENT)
Dept: TRANSPLANT | Facility: CLINIC | Age: 59
End: 2024-09-16
Payer: MEDICAID

## 2024-09-16 ENCOUNTER — HOSPITAL ENCOUNTER (EMERGENCY)
Facility: HOSPITAL | Age: 59
Discharge: HOME | End: 2024-09-16
Attending: STUDENT IN AN ORGANIZED HEALTH CARE EDUCATION/TRAINING PROGRAM
Payer: MEDICAID

## 2024-09-16 VITALS
HEART RATE: 101 BPM | DIASTOLIC BLOOD PRESSURE: 93 MMHG | TEMPERATURE: 98 F | OXYGEN SATURATION: 96 % | WEIGHT: 183 LBS | HEIGHT: 66 IN | RESPIRATION RATE: 18 BRPM | BODY MASS INDEX: 29.41 KG/M2 | SYSTOLIC BLOOD PRESSURE: 124 MMHG

## 2024-09-16 VITALS
SYSTOLIC BLOOD PRESSURE: 118 MMHG | DIASTOLIC BLOOD PRESSURE: 81 MMHG | TEMPERATURE: 97 F | RESPIRATION RATE: 24 BRPM | HEART RATE: 101 BPM

## 2024-09-16 DIAGNOSIS — R07.9 CHEST PAIN: ICD-10-CM

## 2024-09-16 DIAGNOSIS — I50.84 END STAGE HEART FAILURE: Chronic | ICD-10-CM

## 2024-09-16 LAB
ALBUMIN SERPL BCP-MCNC: 3.6 G/DL (ref 3.5–5.2)
ALP SERPL-CCNC: 51 U/L (ref 55–135)
ALT SERPL W/O P-5'-P-CCNC: 19 U/L (ref 10–44)
ANION GAP SERPL CALC-SCNC: 15 MMOL/L (ref 8–16)
ANION GAP SERPL CALC-SCNC: 17 MMOL/L (ref 8–16)
ANISOCYTOSIS BLD QL SMEAR: ABNORMAL
AST SERPL-CCNC: 51 U/L (ref 10–40)
BASOPHILS # BLD AUTO: 0.03 K/UL (ref 0–0.2)
BASOPHILS NFR BLD: 0.5 % (ref 0–1.9)
BILIRUB SERPL-MCNC: 2.5 MG/DL (ref 0.1–1)
BNP SERPL-MCNC: 3176 PG/ML (ref 0–99)
BNP SERPL-MCNC: 4095 PG/ML (ref 0–99)
BUN SERPL-MCNC: 106 MG/DL (ref 6–20)
BUN SERPL-MCNC: 106 MG/DL (ref 6–20)
CALCIUM SERPL-MCNC: 9.7 MG/DL (ref 8.7–10.5)
CALCIUM SERPL-MCNC: 9.8 MG/DL (ref 8.7–10.5)
CHLORIDE SERPL-SCNC: 98 MMOL/L (ref 95–110)
CHLORIDE SERPL-SCNC: 99 MMOL/L (ref 95–110)
CO2 SERPL-SCNC: 28 MMOL/L (ref 23–29)
CO2 SERPL-SCNC: 30 MMOL/L (ref 23–29)
CREAT SERPL-MCNC: 3.2 MG/DL (ref 0.5–1.4)
CREAT SERPL-MCNC: 3.2 MG/DL (ref 0.5–1.4)
DACRYOCYTES BLD QL SMEAR: ABNORMAL
DIFFERENTIAL METHOD BLD: ABNORMAL
EOSINOPHIL # BLD AUTO: 0.1 K/UL (ref 0–0.5)
EOSINOPHIL NFR BLD: 1.6 % (ref 0–8)
ERYTHROCYTE [DISTWIDTH] IN BLOOD BY AUTOMATED COUNT: 31 % (ref 11.5–14.5)
EST. GFR  (NO RACE VARIABLE): 16 ML/MIN/1.73 M^2
EST. GFR  (NO RACE VARIABLE): 16 ML/MIN/1.73 M^2
GLUCOSE SERPL-MCNC: 116 MG/DL (ref 70–110)
GLUCOSE SERPL-MCNC: 168 MG/DL (ref 70–110)
HCT VFR BLD AUTO: 38.8 % (ref 37–48.5)
HGB BLD-MCNC: 11.5 G/DL (ref 12–16)
HOWELL-JOLLY BOD BLD QL SMEAR: ABNORMAL
HYPOCHROMIA BLD QL SMEAR: ABNORMAL
IMM GRANULOCYTES # BLD AUTO: 0.03 K/UL (ref 0–0.04)
IMM GRANULOCYTES NFR BLD AUTO: 0.5 % (ref 0–0.5)
LYMPHOCYTES # BLD AUTO: 0.7 K/UL (ref 1–4.8)
LYMPHOCYTES NFR BLD: 11 % (ref 18–48)
MAGNESIUM SERPL-MCNC: 2.1 MG/DL (ref 1.6–2.6)
MCH RBC QN AUTO: 19.6 PG (ref 27–31)
MCHC RBC AUTO-ENTMCNC: 29.6 G/DL (ref 32–36)
MCV RBC AUTO: 66 FL (ref 82–98)
MONOCYTES # BLD AUTO: 0.6 K/UL (ref 0.3–1)
MONOCYTES NFR BLD: 9.6 % (ref 4–15)
NEUTROPHILS # BLD AUTO: 4.9 K/UL (ref 1.8–7.7)
NEUTROPHILS NFR BLD: 76.8 % (ref 38–73)
NRBC BLD-RTO: 7 /100 WBC
OVALOCYTES BLD QL SMEAR: ABNORMAL
PHOSPHATE SERPL-MCNC: 4.5 MG/DL (ref 2.7–4.5)
PLATELET # BLD AUTO: 122 K/UL (ref 150–450)
PLATELET BLD QL SMEAR: ABNORMAL
PMV BLD AUTO: ABNORMAL FL (ref 9.2–12.9)
POIKILOCYTOSIS BLD QL SMEAR: ABNORMAL
POLYCHROMASIA BLD QL SMEAR: ABNORMAL
POTASSIUM SERPL-SCNC: 3.1 MMOL/L (ref 3.5–5.1)
POTASSIUM SERPL-SCNC: 4.4 MMOL/L (ref 3.5–5.1)
PROT SERPL-MCNC: 6.8 G/DL (ref 6–8.4)
RBC # BLD AUTO: 5.86 M/UL (ref 4–5.4)
SCHISTOCYTES BLD QL SMEAR: ABNORMAL
SCHISTOCYTES BLD QL SMEAR: PRESENT
SODIUM SERPL-SCNC: 143 MMOL/L (ref 136–145)
SODIUM SERPL-SCNC: 144 MMOL/L (ref 136–145)
SPHEROCYTES BLD QL SMEAR: ABNORMAL
TARGETS BLD QL SMEAR: ABNORMAL
TROPONIN I SERPL DL<=0.01 NG/ML-MCNC: 2.28 NG/ML (ref 0–0.03)
WBC # BLD AUTO: 6.43 K/UL (ref 3.9–12.7)

## 2024-09-16 PROCEDURE — 83880 ASSAY OF NATRIURETIC PEPTIDE: CPT | Performed by: INTERNAL MEDICINE

## 2024-09-16 PROCEDURE — 93005 ELECTROCARDIOGRAM TRACING: CPT

## 2024-09-16 PROCEDURE — 93010 ELECTROCARDIOGRAM REPORT: CPT | Mod: ,,, | Performed by: INTERNAL MEDICINE

## 2024-09-16 PROCEDURE — 36415 COLL VENOUS BLD VENIPUNCTURE: CPT | Performed by: INTERNAL MEDICINE

## 2024-09-16 PROCEDURE — 83735 ASSAY OF MAGNESIUM: CPT | Performed by: STUDENT IN AN ORGANIZED HEALTH CARE EDUCATION/TRAINING PROGRAM

## 2024-09-16 PROCEDURE — 84484 ASSAY OF TROPONIN QUANT: CPT | Performed by: STUDENT IN AN ORGANIZED HEALTH CARE EDUCATION/TRAINING PROGRAM

## 2024-09-16 PROCEDURE — 99285 EMERGENCY DEPT VISIT HI MDM: CPT | Mod: 25

## 2024-09-16 PROCEDURE — 80053 COMPREHEN METABOLIC PANEL: CPT | Performed by: STUDENT IN AN ORGANIZED HEALTH CARE EDUCATION/TRAINING PROGRAM

## 2024-09-16 PROCEDURE — 36415 COLL VENOUS BLD VENIPUNCTURE: CPT | Mod: XB | Performed by: STUDENT IN AN ORGANIZED HEALTH CARE EDUCATION/TRAINING PROGRAM

## 2024-09-16 PROCEDURE — 84100 ASSAY OF PHOSPHORUS: CPT | Performed by: STUDENT IN AN ORGANIZED HEALTH CARE EDUCATION/TRAINING PROGRAM

## 2024-09-16 PROCEDURE — 85025 COMPLETE CBC W/AUTO DIFF WBC: CPT | Performed by: STUDENT IN AN ORGANIZED HEALTH CARE EDUCATION/TRAINING PROGRAM

## 2024-09-16 PROCEDURE — 36415 COLL VENOUS BLD VENIPUNCTURE: CPT

## 2024-09-16 PROCEDURE — 80048 BASIC METABOLIC PNL TOTAL CA: CPT | Performed by: INTERNAL MEDICINE

## 2024-09-16 PROCEDURE — 83880 ASSAY OF NATRIURETIC PEPTIDE: CPT | Mod: 91 | Performed by: STUDENT IN AN ORGANIZED HEALTH CARE EDUCATION/TRAINING PROGRAM

## 2024-09-16 NOTE — PROGRESS NOTES
Right upper arm PICC line dressing change done using sterile technique.  No signs of infection noted.  Stat lock device, bio patch and tegaderm dressing reapplied.  Tolerated well

## 2024-09-16 NOTE — ED PROVIDER NOTES
"Encounter Date: 9/16/2024       History     Chief Complaint   Patient presents with    Chest Pain     SOB and substernal CP radiating to left.     59-year-old female with history of CHF due to nonischemic cardiomyopathy, pulmonary hypertension, diabetes, hypertension, atrial fibrillation and ICD, presenting with complaints of generalized fatigue, chest pain, shortness breath.  Patient has not taken prescribed potassium, per her transplant team notes and patient history.  Patient reports that she has chest pain on and off, however the weakness that she is feeling is new.  Patient has been declined as a possible heart transplant candidate by [facility], and per chart review has not had any success in going to other facilities for this.  Patient is readmitted often for heart failure exacerbations.      Review of patient's allergies indicates:   Allergen Reactions    Penicillins Hives and Other (See Comments)    Iodinated contrast media Nausea And Vomiting    Oxycodone-acetaminophen Other (See Comments)     Nausea, Dizziness, Anxiety.  "I don't like how it makes me feel."   Given Hydromorphone 0.5mg IVP  Without problems.  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 Metabolism of other drugs not listed here may also be impacted by UGT1A1 enzyme activity.       Tramadol Nausea And Vomiting and Other (See Comments)     Other reaction(s): Other (See Comments)    Valsartan Other " (See Comments)     Past Medical History:   Diagnosis Date    Allergy     Anemia     Arthritis     Atrial fibrillation     OAC    BMI 32.0-32.9,adult 02/22/2023    Chronic respiratory failure with hypoxia, on home oxygen therapy     2L with activity, off at rest.  Per Pulm  no overt evidence of ILD or COPD on PFTs and CT to explain O2 needs.    CKD (chronic kidney disease), stage IV 05/08/2018    Congestive heart failure     s/p AICD placement,    Deep vein thrombosis     Depression     elevated bilirubin d/t Gilbert's syndrome     confirmed by [place] genetic testing, evaluated by hepatology    Encounter for blood transfusion     GERD (gastroesophageal reflux disease)     Hypertension     Pheochromocytoma, malignant     Right kidney mass     Sleep apnea     Thalassemia trait, alpha     Thyroid disease     Type 2 diabetes mellitus with hyperglycemia, without long-term current use of insulin 08/13/2020     Past Surgical History:   Procedure Laterality Date    ANGIOGRAM, ABDOMINAL AORTA Right 04/15/2024    APPENDECTOMY      BONE MARROW BIOPSY      CARDIAC DEFIBRILLATOR PLACEMENT Left 12/2016    CARDIAC ELECTROPHYSIOLOGY MAPPING AND ABLATION      CARDIAC ELECTROPHYSIOLOGY MAPPING AND ABLATION      COLONOSCOPY N/A 05/06/2022    Procedure: COLONOSCOPY;  Surgeon: Arely Betancourt MD;  Location: [facility] ([address] [place] [address]);  Service: Endoscopy;  Laterality: N/A;  heart transplant candidate/ EF 25% - 2nd floor/ defib - Biotronik - ERW  Eliquis - per Dr. Cortez with [facility], Pt ok to hold Eliquis x 2 days prior-see media tab-outside correspondence dated 12/30/21  - ER[facility]  verbal instructions/portal instructions/email instructions - s    EYE SURGERY      due to running tears    FRACTURE SURGERY Left     hand 5th digit    HYSTERECTOMY      KNEE SURGERY Left 2016    hematoma    LIVER BIOPSY  10/24/2018    Minimal steatosis, predominantly macrovesicular, 1%, Minimal nonspecific portal inflammation, no fibrosis. No findings on biopsy to  explain elevated bilirubin levels. Could be d/t Gilbert's =?- hemolysis    RIGHT HEART CATHETERIZATION Right 2021    Procedure: INSERTION, CATHETER, RIGHT HEART;  Surgeon: Irving Cardenas MD;  Location: [facility] CATH LAB;  Service: Cardiology;  Laterality: Right;    RIGHT HEART CATHETERIZATION Right 2022    Procedure: INSERTION, CATHETER, RIGHT HEART;  Surgeon: Burke Camilo MD;  Location: [facility] CATH LAB;  Service: Cardiology;  Laterality: Right;    RIGHT HEART CATHETERIZATION Right 2023    Procedure: INSERTION, CATHETER, RIGHT HEART;  Surgeon: Katie Liriano DO;  Location: [facility] CATH LAB;  Service: Cardiology;  Laterality: Right;    TRANSJUGULAR BIOPSY OF LIVER N/A 10/24/2018    Procedure: BIOPSY, LIVER, TRANSJUGULAR APPROACH;  Surgeon: Carmen Diagnostic Provider;  Location: [facility] OR [address] [place] [address];  Service: Radiology;  Laterality: N/A;     Family History   Problem Relation Name Age of Onset    Cancer Mother          pancreatic CA early 50's    Heart disease Father           MI in late 50's    Hypertension Father      Heart attack Father      Heart disease Sister      Heart disease Brother      Cirrhosis Brother          alcoholic    Heart disease Sister      Heart disease Brother      Hypertension Brother      Diabetes Brother       Social History     Tobacco Use    Smoking status: Never    Smokeless tobacco: Never   Substance Use Topics    Alcohol use: Not Currently     Comment: up to 1 yr ago drank 2-3 drinks on occasion but sporadic    Drug use: No     Review of Systems   Constitutional:  Positive for fatigue. Negative for fever.   HENT:  Negative for congestion and sore throat.    Respiratory:  Positive for cough and shortness of breath.    Cardiovascular:  Positive for chest pain.   Gastrointestinal:  Negative for abdominal pain, diarrhea, nausea and vomiting.   Genitourinary:  Negative for dysuria.   Musculoskeletal:  Negative for back pain.   Skin:  Negative for rash.   Neurological:   Negative for weakness.   Hematological:  Does not bruise/bleed easily.       Physical Exam     Initial Vitals [09/16/24 1618]   BP Pulse Resp Temp SpO2   114/87 (!) 118 16 98.1 °F (36.7 °C) 96 %      MAP       --         Physical Exam    Nursing note and vitals reviewed.  Constitutional: She appears well-developed. She is not diaphoretic. No distress.   HENT:   Head: Normocephalic.   Eyes: Pupils are equal, round, and reactive to light.   Neck:   Normal range of motion.  Cardiovascular:            No murmur heard.  Pulmonary/Chest: No respiratory distress.   Bilateral crackles at bases   Abdominal: Abdomen is soft.   Mild distention, no tenderness to palpation.   Musculoskeletal:         General: No edema.      Cervical back: Normal range of motion.      Comments: PICC line in place on right side, no erythema around site.    Mild bilateral lower extremity edema.     Neurological: She is alert.   Skin: Skin is warm.   Psychiatric: She has a normal mood and affect.         ED Course   Procedures  Labs Reviewed   B-TYPE NATRIURETIC PEPTIDE - Abnormal       Result Value    BNP 3,176 (*)    CBC W/ AUTO DIFFERENTIAL - Abnormal    WBC 6.43      RBC 5.86 (*)     Hemoglobin 11.5 (*)     Hematocrit 38.8      MCV 66 (*)     MCH 19.6 (*)     MCHC 29.6 (*)     RDW 31.0 (*)     Platelets 122 (*)     MPV SEE COMMENT      Immature Granulocytes 0.5      Gran # (ANC) 4.9      Immature Grans (Abs) 0.03      Lymph # 0.7 (*)     Mono # 0.6      Eos # 0.1      Baso # 0.03      nRBC 7 (*)     Gran % 76.8 (*)     Lymph % 11.0 (*)     Mono % 9.6      Eosinophil % 1.6      Basophil % 0.5      Platelet Estimate Decreased (*)     Aniso Marked      Poik Moderate      Poly Moderate      Hypo Occasional      Ovalocytes Occasional      Target Cells Occasional      Tear Drop Cells Occasional      Spherocytes Occasional      Schistocytes Present      Phillips-Jolly Bodies Moderate      Fragmented Cells Moderate      Differential Method Automated      COMPREHENSIVE METABOLIC PANEL - Abnormal    Sodium 144      Potassium 4.4      Chloride 99      CO2 28      Glucose 116 (*)      (*)     Creatinine 3.2 (*)     Calcium 9.8      Total Protein 6.8      Albumin 3.6      Total Bilirubin 2.5 (*)     Alkaline Phosphatase 51 (*)     AST 51 (*)     ALT 19      eGFR 16 (*)     Anion Gap 17 (*)    TROPONIN I - Abnormal    Troponin I 2.277 (*)    MAGNESIUM    Magnesium 2.1     PHOSPHORUS    Phosphorus 4.5     URINALYSIS, REFLEX TO URINE CULTURE     EKG Readings: (Independently Interpreted)   Initial Reading: No STEMI. Rhythm: Sinus Tachycardia. Heart Rate: 115. Ectopy: No Ectopy. Conduction: LAFB.       Imaging Results              X-Ray Chest AP Portable (Final result)  Result time 09/16/24 17:36:09      Final result by Brenton Meza MD (09/16/24 17:36:09)                   Impression:      Cardiomegaly.  Left AICD.  Right PICC line.      Electronically signed by: Brenton Meza MD  Date:    09/16/2024  Time:    17:36               Narrative:    EXAMINATION:  XR CHEST AP PORTABLE    CLINICAL HISTORY:  chest pain;    TECHNIQUE:  Single frontal view of the chest was performed.    COMPARISON:  08/06/2024    FINDINGS:  A right upper extremity PICC line has its tip in the superior vena cava.  A left chest single lead AICD is present with the heart is enlarged.  No consolidation or edema or definite pleural effusion, noting that the cardiac silhouette obscures the left lung base.                                       Medications - No data to display  Medical Decision Making  DDX: Generalized weakness and chest pain with shortness of breath.  Most likely cause is exacerbation of her chronic heart failure.  Given her reports of fatigue, will r/o infection, significant anemia, ACS, arrhythmia, electrolyte abnormality, [place], metabolic abnormality.   DX: BMP, CBC, magnesium, phosphorus, troponin. EKG. CXR, UA.  TX: Analgesia PRN. Treatment/consult as indicated by  studies.   DISPO: Pending studies.        Amount and/or Complexity of Data Reviewed  Labs: ordered.  Radiology: ordered.               ED Course as of 09/16/24 1742   Mon Sep 16, 2024   1741 Patient is at her baseline, vitals stable.  Will discharge home. [NB]      ED Course User Index  [NB] Efe Norris MD                           Clinical Impression:  Final diagnoses:  [R07.9] Chest pain          ED Disposition Condition    Discharge Stable          ED Prescriptions    None       Follow-up Information       Follow up With Specialties Details Why Contact Info    Cristobal Ann MD Family Medicine Schedule an appointment as soon as possible for a visit in 2 days  [address] [facility] [address]  [phone]      [facility] - Emergency Dept Emergency Medicine  If symptoms worsen [address] [place] [id number]  [phone]             Efe Norris MD  09/16/24 1613       Efe Norris MD  09/16/24 1618       Efe Norris MD  09/16/24 1742

## 2024-09-16 NOTE — TELEPHONE ENCOUNTER
9/16/24 - Follow up on today's labs,   Na/K+ 143/3.1, BUN/Cr - 106/3.2.    Patient was seen in clinic 9/5, by JESUSITA Camilo M.D., and he increased her Bumex to 4 mg BID.    Called/spoke with patient and confirmed patient increased her Bumex to 4 mg BID.  Asked patient if she has been taking her potassium chloride 20 meq BID.  Patient stated she has been taking her Calcium, then stated she has been taking her Magnesium.  I instructed patient it is potassium chloride and Dr. Cuellar sent it to her pharmacy 9/5.  Patient stated no, she did not pick it up because she thought she thought it was the Magnesium.  I isntructed patient she needed to pick the prescription up and start taking it as prescribed.  Patient verbalized understanding.    While on the phone with patient she was struggling to speak to me and it was difficult to understand her, she seemed to be short of breath.  Patient stated she is trying to catch her breath.   She could barely complete a sentence and became very tearful on the phone.  Patient stated she is very short of breath, has a cough and is just not feeling right.  I advised patient to report to the nearest ER or call EMS if she does not have someone to take her, due to her difficulty catching her breath while trying to speak, cough and her not feeling well.  Patient denies fever.  I confirmed with patient if she was going to report to call EMS and patient stated yes, that way she can get to the ER faster.  I verbalized understanding and told her to contact our office and let us know.  Patient stated she would.    Remind me entered to contact patient tomorrow to see if she reported to the ER.

## 2024-09-16 NOTE — PROGRESS NOTES
Blood drawn via venistick   unable to draw blood from PICC line  Flushed with saline.  Pt will flush with heparin at home

## 2024-09-17 LAB
OHS QRS DURATION: 108 MS
OHS QTC CALCULATION: 428 MS

## 2024-09-18 ENCOUNTER — DOCUMENTATION ONLY (OUTPATIENT)
Dept: TRANSPLANT | Facility: CLINIC | Age: 59
End: 2024-09-18
Payer: MEDICAID

## 2024-09-18 NOTE — PROGRESS NOTES
Phone reminder 9/10  Noted pt did not show for that lab appt  NED Finley MA, contacted pt on 09/10 and pt rescheduled to 9/13 which she did not show for  See Dr. Cuellar clinic note w/ indication for this lab.

## 2024-09-20 ENCOUNTER — TELEPHONE (OUTPATIENT)
Dept: FAMILY MEDICINE | Facility: CLINIC | Age: 59
End: 2024-09-20
Payer: MEDICAID

## 2024-09-20 NOTE — TELEPHONE ENCOUNTER
----- Message from Tony Bello sent at 2024  1:55 PM CDT -----  Contact: Charu Hawley  MRN: [id number]  : 1965  PCP: Cristobal Ann  Home Phone      [phone]  Work Phone      Not on file.  Mobile          [phone]      MESSAGE:     [facility] called to schedule hosp f/u for pt. PAR didn't have anything until . Pt would need to be seen sooner.          Please advise  Charu  [phone]

## 2024-09-27 ENCOUNTER — TELEPHONE (OUTPATIENT)
Dept: PALLIATIVE MEDICINE | Facility: CLINIC | Age: 59
End: 2024-09-27
Payer: MEDICAID

## 2024-09-29 DIAGNOSIS — R52 PAIN: ICD-10-CM

## 2024-09-30 ENCOUNTER — TELEPHONE (OUTPATIENT)
Dept: FAMILY MEDICINE | Facility: CLINIC | Age: 59
End: 2024-09-30
Payer: MEDICAID

## 2024-09-30 NOTE — TELEPHONE ENCOUNTER
----- Message from Tony sent at 2024  3:02 PM CDT -----  Contact: Sabiha Hawley  MRN: [id number]  : 1965  PCP: Cristobal Ann  Home Phone      [phone]  Work Phone      Not on file.  Mobile          [phone]      MESSAGE:     Sabiha with [facility] called wanting to speak with nurse about putting orders in for HH for pt. She wants to know if Dr. Ann would follow pt during HH. Fax # [phone]        Please advise  Sabiha  [phone]

## 2024-10-01 ENCOUNTER — TELEPHONE (OUTPATIENT)
Dept: PALLIATIVE MEDICINE | Facility: CLINIC | Age: 59
End: 2024-10-01
Payer: MEDICAID

## 2024-10-01 ENCOUNTER — OFFICE VISIT (OUTPATIENT)
Dept: PALLIATIVE MEDICINE | Facility: CLINIC | Age: 59
End: 2024-10-01
Payer: MEDICAID

## 2024-10-01 DIAGNOSIS — R06.00 DYSPNEA, UNSPECIFIED TYPE: Primary | ICD-10-CM

## 2024-10-01 DIAGNOSIS — R05.8 OTHER COUGH: ICD-10-CM

## 2024-10-01 PROCEDURE — 98966 PH1 ASSMT&MGMT NQHP 5-10: CPT | Mod: 93,,, | Performed by: STUDENT IN AN ORGANIZED HEALTH CARE EDUCATION/TRAINING PROGRAM

## 2024-10-01 PROCEDURE — 99497 ADVNCD CARE PLAN 30 MIN: CPT | Mod: 93,,, | Performed by: STUDENT IN AN ORGANIZED HEALTH CARE EDUCATION/TRAINING PROGRAM

## 2024-10-01 PROCEDURE — 3066F NEPHROPATHY DOC TX: CPT | Mod: CPTII,93,, | Performed by: STUDENT IN AN ORGANIZED HEALTH CARE EDUCATION/TRAINING PROGRAM

## 2024-10-01 PROCEDURE — 4010F ACE/ARB THERAPY RXD/TAKEN: CPT | Mod: CPTII,93,, | Performed by: STUDENT IN AN ORGANIZED HEALTH CARE EDUCATION/TRAINING PROGRAM

## 2024-10-01 PROCEDURE — 3044F HG A1C LEVEL LT 7.0%: CPT | Mod: CPTII,93,, | Performed by: STUDENT IN AN ORGANIZED HEALTH CARE EDUCATION/TRAINING PROGRAM

## 2024-10-01 RX ORDER — HYDROCODONE BITARTRATE AND ACETAMINOPHEN 10; 325 MG/1; MG/1
1 TABLET ORAL EVERY 6 HOURS PRN
Qty: 120 TABLET | Refills: 0 | Status: SHIPPED | OUTPATIENT
Start: 2024-10-01

## 2024-10-01 RX ORDER — PROMETHAZINE HYDROCHLORIDE AND CODEINE PHOSPHATE 6.25; 1 MG/5ML; MG/5ML
5 SOLUTION ORAL EVERY 4 HOURS PRN
Qty: 240 ML | Refills: 0 | Status: SHIPPED | OUTPATIENT
Start: 2024-10-01 | End: 2024-10-11

## 2024-10-01 NOTE — PROGRESS NOTES
Palliative Medicine Clinic Note      Consult Requested By: No ref. provider found    Primary Care Physician:   Cristobal Ann MD    Reason for Consult: Advance care planning and symptom management in the setting of heart Failure     Audio Only visit     ASSESSMENT/PLAN:     Plan/Recommendations:  Diagnoses and all orders for this visit:      Congestive heart failure, unspecified HF chronicity, unspecified heart failure type /  Acute decompensated heart failure / Pulmonary HTN  -NICM since 2013 HFrEF (EF 10%) s/p AICD placement on palliative dobutamine   -Not a candidate for advance therapies   -recent admissions for ADHF  -she states that entresto and bumex were discontinued during last admission  -wants to go to [place] to get eval for heart/kidney transplant  - Assessed patient's post-hospital discharge status, noting persistent weakness, congestion, and pain.  - Recognized declining strength as a potential barrier to transplant candidacy.  - Follow up regarding the referral process for transplant evaluation at [facility] in [place].  - Considered hospice as a potential next step if patient is deemed too weak for transplant.      Anxiety  -states Zoloft was d/c during last admission      Dyspnea, unspecified type  Cough  -feels significant dyspnea and chest pain  - Evaluated shortness of breath, rated 7/10 by patient.  - Considered impact of recent medication changes on patient's symptoms and comfort.  - Acknowledged need for pain management due to chest and back discomfort from coughing.  - Refilled Norco for pain management, to be taken 2-3 times daily as needed.:  - Restarted promethazine with codeine syrup for cough and pain relief, to be taken every 6-8 hours as needed.      Constipation, unspecified constipation type  - polyethylene glycol (MIRALAX) 17 gram/dose powder; Take 17 g by mouth once daily.  Dispense: 510 g; Refill: 0  -Linzess  - Continued MiraLax for constipation prophylaxis with  initiation of fentanyl., increase dose to 3 times daily if needed.        Pain   Gout  Leg Cramps  Bilateral lower extremity, sharp burning and tingling pain  -Off Lyrica since last admission  -Requip caused nausea   -Norco 10-325mg q 6H PRN- refilled   - Continued Norco as needed for breakthrough pain/dyspnea.  --Gout: Crystal proven on arthrocentesis from 5/2023 on Allopurinol 300mg daily and prednisone for pain - IP team d/x allopurinol       Nausea  - ondansetron (ZOFRAN-ODT) 8 MG  every 12 (twelve) hours PRN      Stage 5 chronic kidney disease  -Managed by nephrology  -this is a determinant of management of her gout and pain  -renally dosing meds      Palliative care encounter  Medicolegal: Has decision making capacity. Named her daughter Erika Estrada is HCPOA.     Goals of care:    Advance Care Planning   Date: 10/01/2024    [facility]  I engaged the patient in a voluntary conversation about advance care planning and we specifically addressed what the goals of care would be moving forward, in light of the patient's change in clinical status, specifically multiple recent admissions.  We did specifically address the patient's likely prognosis, which is poor.  We explored the patient's values and preferences for future care.  The patient endorses that what is most important right now is to focus on curative/life-prolongation (regardless of treatment burdens). Pursuing evaluation for heart and kidney transplant at [facility] in [place]. provider expresses concern about patient's weakening condition, which may impact eligibility for transplant, discussed that the next step would be hospice care to help the patient feel better at home     Accordingly, we have decided that the best plan to meet the patient's goals includes continuing with treatment         ACP Date: 07/11/2023  I engaged the patient and   in a voluntary conversation about advance care planning and we specifically addressed what the goals  of care would be moving forward, in light of the patient's change in clinical status, specifically worsening heart failure with multiple admissions.  We did specifically address the patient's likely prognosis, which is poor.  I shared that in the best case scenario her prognosis might be month and that given the nature of her heart condition she can die any time. I did explain the role for hospice care at this stage of the patient's illness, including its ability to help the patient live with the best quality of life possible.  We explored the patient's values and preferences for future care.  The patient endorses that what is most important right now is to focus on curative/life-prolongation (regardless of treatment burdens),.  She states that she is doing everything in her power to be evaluated at [facility] because she is hoping to obtain a heart transplant.  Her  encouraged her to do everything as fast as possible so that she can go to [place] soon. Accordingly, we have decided that the best plan to meet the patient's goals includes continuing with treatment      ACP Date: 05/24/2023  I engaged the patient in a conversation about advance care planning and we specifically addressed what the goals of care would be moving forward, in light of the patient's change in clinical status, specifically now on palliative dobutamine.  We did specifically address the patient's likely prognosis, which is poor.  We discussed that dobutamine is mainly to help her feel better and will not change her heart condition. We explored the patient's values and preferences for future care.  The patient endorses that what is most important right now is to focus on remaining as independent as possible, symptom/pain control, and curative/life-prolongation (regardless of treatment burdens).  She shared that she is hoping for the better that she is hoping to get better.  She hopes that her heart will get better, she shared that  she knows that she has been told in the past that it will not improve.  She shared that she is hoping for a miracle to happen so that her heart improves.  She is also hoping to be able to spend more time with family to be part of the activities to go out and enjoy eating with them.  Accordingly, we have decided that the best plan to meet the patient's goals includes continuing with treatment      ACP Date: 02/27/2023  I engaged the patient in a conversation about advance care planning.  We discussed HCPOA, she wanted to change her current HCPOA and name her daughter Erika Estrada as her agent and her sister and son as backups. We specifically addressed what the goals of care would be moving forward, in light of the patient's change in clinical status, specifically transplant workup.  We did specifically address the patient's likely prognosis, which is poor w/o a trasnplant.  We explored the patient's values and preferences for future care.  The patient endorses that what is most important right now is to focus on curative/life-prolongation (regardless of treatment burdens)  Accordingly, we have decided that the best plan to meet the patient's goals includes continuing with treatment      Code status:  Full code    Advance directives:HCPOA on file       min time was spent on advance care planning, goals of care discussion, emotional support, formulating and communicating prognosis and goals of care, exploring burden/benefit of various approaches of treatment.          Follow up: 4m         SUBJECTIVE:     History obtained from: patient     complaint:   No chief complaint on file.      History of Present Illness / Interval History:  Hafsa Hawley is 59 y.o. year old female presenting with heart failure .  Referred to Palliative Care for evaluation and management of physical symptoms. female attended the appointment alone       The patient fell two weeks ago, causing balance issues, occasional dizziness  when moving too quickly, and lightheadedness when standing up, which she attributes to a recent cold. The fall aggravated a pre-existing knee injury, resulting in ongoing pain and popping despite reduced swelling. She has been using Voltaren gel and a wrap for pain relief, but the pain persists. Dr. Michel administered a shot for the knee issue about a month and a half ago, providing temporary relief. The patient also experiences cramping and curling of her foot, feeling like a vein or something going into it, causing the foot to stay in an upward position until it subsides. She manages these symptoms with magnesium prescribed by Dr. Michel and a topical therapy spray. The patient is hesitant to go on vacation due to concerns about her health during long car rides. She experiences shortness of breath, using a nebulizer 2-3 times daily and cough syrup for phlegm and a persistent cough. Her voice is intermittently hoarse, and she sometimes finds it difficult to speak for extended periods. The patient reports a lack of appetite and difficulty eating, particularly with meat and certain vegetables, causing nausea and a feeling of the food coming back up. She finds relief in consuming ice chips, popsicles, and other cold, soothing items. The patient also mentions hair loss and a general lack of energy to maintain her appearance. The patient expresses anxiety about her health, which she believes may be contributing to her sleep difficulties. She takes Zoloft and Lyrica for her mental health and pain management. The patient denies experiencing chest pain or fever in relation to her respiratory symptoms. She has not been diagnosed with glaucoma, despite some eye-related concerns.  Patient fell two weeks ago and has been feeling off-balance since then, especially when moving too fast. She feels dizzy and lightheaded when standing up. Patient's knee is still bothering her a lot after the fall. It pops and hurts badly, although the  swelling has improved. Patient is scared to go on vacation due to concerns about riding long distances with her medical conditions. She worries something might happen during a trip lasting over 2-2.5 hours, although she has all her medical supplies like extra pumps, batteries, nebulizer, and solutions. Patient's hair is falling out and she has not been maintaining it well, just putting it up in a loop. She had her granddaughter comb it a bit before the appointment. Patient is aggravated by having to keep a notepad to track diabetes, blood pressure, weight, and other health metrics. Patient reports mostly being constipated, having to strain and bleed due to hard stool. Sometimes she has to manually disimpact herself. She has tried MiraLAX, Metamucil, and other laxatives to manage this. Patient has difficulty sleeping, feeling she can't fall asleep or wake up, and attributes this to anxiety wearing her out. Patient reports enjoying her days with her grandkids, especially the little baby, who keeps her on her toes.      ---------------    Since last visit ED visits admissions 6/27, 6/5, 5/5, 4/8    Recent echo 5/5/24- EF 10-15%  ------------------    The patient reports a recent fall on both sides, resulting in a broken rib and knee pain. She has been taking pain medications as needed. She describes a pain in the leg that feels like a cramp, lasting up to 20 minutes, and occurs mostly during sleep. She has been taking Bumex and metolazone for fluid management, but has not taken metolazone for a week. She experiences frequent urination, sometimes involuntarily, and has been wearing pads for protection. She has been spending time with her grandchildren and engaging in activities such as walking and gardening. She has had stress due to a family situation involving her son. She denies any recent changes in medication or any significant improvement in leg pain.    -------    Lower extremity pain has been well-controlled on  prednisone 5 mg.  She is feeling better.  Minimal shortness of breath might be sick with a cold.  She continues to have significant anxiety.  Has significant nausea and constipation.  Her weight is up and down however she her dry weight is around 179 lb.  Unable to sleep well at night.    --------    Recent ED visits due to lower extremity pain  Continues to have pain in her left food and right knee.  Describes a sharp pain and she is taking her pain medication which helps with her pain but does not completely resolve it.  She takes Tylenol as well.  Describes her lower extremity pain as burning and tingling and sometimes sharp  Worsening anxiety everyday all day.  Taking Zofran daily  Not constipated she is taking Dulcolax  Not on Ozempic for now because of a high co-pay  She is stopped Lexapro    ---------    [facility] admission for ADHF 6/27  CKD plus [place] from cardiorenal syndrome and ADCHF. She now is close to euvolemic. On  dobutamine 5mcg/kg/min and Bumex   She reports nausea occasionally, having daily bowel movements.  Has cough all day however she does not feel swollen.  He feels her legs are weaker and now stairs her becoming difficult to navigate.    ----------  She states that she has not been feeling good.  She has abdominal pain significant constipation.  She also describes nausea and bloating.  She shared that her medications are constantly changing so it is difficult for her to keep track of those changes.  She is compliant with medications she is drinking some water because she feels very dry.  She has an appointment with Cardiology on the 17th    -----    She reports that she is feeling okay now that she is on the dobutamine pump.  She feels it has made a difference in her energy level and that she is not eating too much oxygen anymore.  She continues to hope for improvement.  However she is unable to do the things that she enjoys like spending time with her grandkids and going out because sometimes she  is not feeling well.  She feels fatigued she has a lot of pain in her knees and her hips and legs.  She is unable to walk at times.  She also endorsed decreased appetite and significant nausea that improves with Zofran however she needs to take 8 mg for it to work.         Disease History:  NICM since 2013 HFrEF (EF 10%) s/p AICD placement 3/9/22 declined for OHT or LVAD due to renal function, lung function   pulmonary hypertension  chronic hypoxic and hypercapnic respiratory failure   Paroxysmal A Fib on Eliquis, Pulmonary Hypertension  Hypertension, Type 2 Diabetes Mellitus           External  database queried on 3/26/24 by Sandra Hernandes .   The results reviewed and considered with the clinical data in the decision whether or not to prescribe a controlled substance.    03/18/2024 02/15/2024 3 Alprazolam 2 Mg Tablet 60.00 30 Ch Par [id number] Och (1974) 1 8.00 LME Comm [place]   03/07/2024 03/06/2024 3 Pregabalin 100 Mg Capsule 30.00 30 Mi Bac [id number] Och (1974) 0 0.67 [place]   02/28/2024 02/28/2024 3 Hydrocodone-Acetamin  Mg 120.00 30 Er [place] [id number] Och (1974) 0 40.00 [place]   02/21/2024 02/20/2024 3 Promethazine-Codeine Solution 473.00 24 Ch Par [id number] O[facility] (1974) 0 5.91 [place]   02/15/2024 02/15/2024 3 Alprazolam 2 Mg Tablet 60.00 30 Ch Par [id number] O[facility] (1974) 0 8.00 [place]       Medications:    Current Outpatient Medications:     acetaminophen (TYLENOL) 650 MG TbSR, Take 1 tablet (650 mg total) by mouth every 8 (eight) hours. (Patient taking differently: Take 650 mg by mouth daily as needed (pain).), Disp: 21 tablet, Rfl: 0    albuterol-ipratropium (DUO-NEB) 2.5 mg-0.5 mg/3 mL nebulizer solution, Take 3 mLs by nebulization every 6 (six) hours as needed for Wheezing or Shortness of Breath., Disp: 90 mL, Rfl: 2    allopurinoL (ZYLOPRIM) 300 MG tablet, Take 1 tablet (300 mg total) by mouth once daily., Disp: 90 tablet, Rfl: 1    ALPRAZolam (XANAX) 2  MG Tab, Take 1 tablet orally 2 times a day., Disp: 30 tablet, Rfl: 0    amiodarone (PACERONE) 200 MG Tab, take one tablet by mouth daily, Disp: 90 tablet, Rfl: 4    apixaban (ELIQUIS) 2.5 mg Tab, take one tablet by mouth twice a day, Disp: 180 tablet, Rfl: 4    aspirin (ECOTRIN) 81 MG EC tablet, Take 81 mg by mouth once daily., Disp: , Rfl:     atorvastatin (LIPITOR) 40 MG tablet, take one tablet by mouth in the evening, Disp: 90 tablet, Rfl: 4    blood-glucose sensor (DEXCOM G7 SENSOR) Evon, change sensor every 10 days., Disp: 3 each, Rfl: 11    bumetanide (BUMEX) 2 MG tablet, Take 2 tablets (4 mg total) by mouth 2 (two) times daily., Disp: 120 tablet, Rfl: 2    cetirizine (ZYRTEC) 10 MG tablet, Take 1 tablet (10 mg total) by mouth once daily., Disp: 30 tablet, Rfl: 12    cyanocobalamin (VITAMIN B-12) 1000 MCG tablet, Take 1,000 mcg by mouth once daily., Disp: , Rfl:     DOBUTamine (DOBUTREX) 1,000 mg/250 mL (4,000 mcg/mL) infusion, Inject 409 mcg/min into the vein continuous., Disp: , Rfl:     empagliflozin (JARDIANCE) 25 mg tablet, take one tablet by mouth daily, Disp: 90 tablet, Rfl: 4    ergocalciferol (VITAMIN D2) 50,000 unit Cap, Take 1 capsule orally once a  week (Patient taking differently: Take 50,000 Units by mouth every 7 days. Thursdays), Disp: 4 capsule, Rfl: 11    ferrous sulfate 325 (65 FE) MG EC tablet, Take 325 mg by mouth once daily., Disp: , Rfl:     fluticasone propionate (FLONASE ALLERGY RELIEF) 50 mcg/actuation nasal spray, Use 2 sprays (100 mcg total) by Each Nostril route once daily. (Patient taking differently: 2 sprays by Each Nostril route daily as needed for Rhinitis or Allergies.), Disp: 16 g, Rfl: 12    glimepiride (AMARYL) 1 MG tablet, Take 1 tablet (1 mg total) by mouth before breakfast., Disp: 90 tablet, Rfl: 3    HYDROcodone-acetaminophen (NORCO)  mg per tablet, Take 1 tablet by mouth every 6 (six) hours as needed for Pain., Disp: 120 tablet, Rfl: 0    hydrOXYzine HCL  (ATARAX) 25 MG tablet, take one tablet orally four times a day as needed for itching, Disp: 120 tablet, Rfl: 0    isosorbide-hydrALAZINE 20-37.5 mg (BIDIL) 20-37.5 mg Tab, Take 1 tablet by mouth 3 (three) times daily. (Patient taking differently: Take 1 tablet by mouth 2 (two) times daily.), Disp: 90 tablet, Rfl: 3    isosorbide-hydrALAZINE 20-37.5 mg (BIDIL) 20-37.5 mg Tab, take 1 tablet orally twice a day, Disp: 180 tablet, Rfl: 0    LIDOcaine (LIDODERM) 5 %, Place 1 patch onto the skin once daily. Remove & discard patch within 12 hours or as directed by MD. (Patient taking differently: Place 1 patch onto the skin daily as needed (pain).), Disp: 30 patch, Rfl: 1    magnesium oxide (MAG-OX) 400 mg (241.3 mg magnesium) tablet, take one tablet by mouth daily, Disp: 90 tablet, Rfl: 4    metOLazone (ZAROXOLYN) 5 MG tablet, Take 1 tablet (5 mg total) by mouth daily as needed (Excess fluid)., Disp: , Rfl:     metoprolol succinate (TOPROL-XL) 25 MG 24 hr tablet, Take 0.5 tablets (12.5 mg total) by mouth once daily., Disp: , Rfl:     metoprolol succinate (TOPROL-XL) 25 MG 24 hr tablet, take one tablet by mouth daily, Disp: 90 tablet, Rfl: 0    nitroGLYCERIN (NITROSTAT) 0.4 MG SL tablet, Put 1 tab under the tongue every 5 minutes x 3 doses as needed for chest pain. Do not exceed 3 doses in 15 minutes. If no relief, go to ER., Disp: 25 tablet, Rfl: 4    ondansetron (ZOFRAN-ODT) 8 MG TbDL, Dissolve 1 tablet (8 mg total) by mouth every 8 (eight) hours as needed (nausea)., Disp: 30 tablet, Rfl: 4    pantoprazole (PROTONIX) 40 MG tablet, Take one tablet by mouth daily, Disp: 90 tablet, Rfl: 4    pantoprazole (PROTONIX) 40 MG tablet, take one tablet by mouth daily, Disp: 90 tablet, Rfl: 0    polyethylene glycol (MIRALAX) 17 gram/dose powder, Take 17 g by mouth once daily. (Patient taking differently: Take 17 g by mouth daily as needed for Constipation.), Disp: 510 g, Rfl: 0    potassium chloride SA (K-DUR,KLOR-CON) 20 MEQ tablet,  "Take 1 tablet (20 mEq total) by mouth 2 (two) times daily., Disp: 60 tablet, Rfl: 5    pregabalin (LYRICA) 50 MG capsule, Take 1 capsule (50 mg total) by mouth every evening., Disp: 30 capsule, Rfl: 2    sevelamer carbonate (RENVELA) 800 mg Tab, take FOUR tablets orally three times a day with meals, Disp: 1080 tablet, Rfl: 0    SPIRIVA WITH HANDIHALER 18 mcg inhalation capsule, Inhale 1 capsule (18 mcg total) into the lungs once daily., Disp: 30 capsule, Rfl: 5    torsemide (DEMADEX) 100 MG Tab, take one tablet by mouth daily in the morning, Disp: 90 tablet, Rfl: 0    vancomycin (VANCOCIN) 125 MG capsule, take one capsule by mouth four times a day for four days, Disp: 16 capsule, Rfl: 0    VENTOLIN HFA 90 mcg/actuation inhaler, Inhale 2 puffs into the lungs every 6 (six) hours as needed for Shortness of Breath or Wheezing. (Patient taking differently: Inhale 2 puffs into the lungs daily as needed for Shortness of Breath or Wheezing.), Disp: 18 g, Rfl: 11  No current facility-administered medications for this visit.    Facility-Administered Medications Ordered in Other Visits:     0.9%  NaCl infusion, , Intravenous, Continuous, Corinna Hayes NP, New Bag at 05/23/19 0751      Review of patient's allergies indicates:   Allergen Reactions    Penicillins Hives and Other (See Comments)    Iodinated contrast media Nausea And Vomiting    Oxycodone-acetaminophen Other (See Comments)     Nausea, Dizziness, Anxiety.  "I don't like how it makes me feel."   Given Hydromorphone 0.5mg IVP  Without problems.  Other reaction(s): Other (See Comments)    Clonazepam Other (See Comments)    Diovan hct [valsartan-hydrochlorothiazide] Other (See Comments)     Causes coughing    Iodine Other (See Comments)    Irinotecan      Pt has homozygosity for the TA7 promoter variant that places this individual at significantly increased risk for   severe neutropenia (grade 4) when treated with the standard dose of irinotecan (risk " "approximately 50%).   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 Metabolism of other drugs not listed here may also be impacted by UGT1A1 enzyme activity.       Tramadol Nausea And Vomiting and Other (See Comments)     Other reaction(s): Other (See Comments)    Valsartan Other (See Comments)       OBJECTIVE:       Review of Symptoms      Symptom Assessment (ESAS 0-10 Scale)  Pain:  5  Dyspnea:  8  Anxiety:  2  Nausea:  5  Depression:  2  Anorexia:  5  Fatigue:  10  Insomnia:  8  Restlessness:  0  Agitation:  0     CAM / Delirium:  Negative  Constipation:  Negative  Diarrhea:  Negative      Comments:  Miralax    Pain Assessment:    Location(s): none      Performance Status:  60    Living Arrangements:  Lives with family    Psychosocial/Cultural:   See Palliative Psychosocial Note: Yes  Lives with  and granddaughter. Pt lives in a mobile home with 5 NIRAJ. Pt also has support from her two sisters Benoit Baum [phone], Jeanie Jaimes [phone]. Pt is mostly independent with ADL's, but has a RW, SC and home oxygen, which she states she uses "as needed".   dgtr Erika Estrada, 33, [place], son Miguel Baum, 35  **Primary  to Follow**  Palliative Care  Consult: Yes    Spiritual:  F - Damaris and Belief:  Religious       Advance Care Planning   Advance Directives:   Living Will: No    LaPOST: No    Do Not Resuscitate Status: No    Medical Power of : Yes    Agent's Name:  Erika Estrada    Decision Making:  Patient answered questions  Goals of Care: What is most important right now is to focus on curative/life-prolongation (regardless of treatment burdens), remaining as independent as possible, symptom/pain control. Accordingly, we have decided that the best plan to meet the patient's goals includes continuing with treatment.    She enjoys spending time with her " grandkids and going fishing. Her goal is to maintain her health and continue doing activities she enjoys.              Physical Exam: audio only  Vitals:    Physical Exam  Pulmonary:      Effort: No respiratory distress.         I spent a total of minutes on the day of the visit. This includes face to face time in discussion of goals of care, symptom assessment, coordination of care and emotional support.  This also includes non-face to face time preparing to see the patient (eg, review of tests/imaging), obtaining and/or reviewing separately obtained history, documenting clinical information in the electronic or other health record, independently interpreting results and communicating results to the patient/family/caregiver, or care coordinator.     A total of 16 min was spent on advance care planning, goals of care discussion, emotional support, formulating and communicating prognosis and exploring burden/benefit of various approaches of treatment. This discussion occurred on a fully voluntary basis with the verbal consent of the patient and/or family.     This note was generated with the assistance of ambient listening technology. Verbal consent was obtained by the patient and accompanying visitor(s) for the recording of patient appointment to facilitate this note. I attest to having reviewed and edited the generated note for accuracy, though some syntax or spelling errors may persist. Please contact the author of this note for any clarification.      Signature: Sandra Hernandes MD

## 2024-10-03 NOTE — TELEPHONE ENCOUNTER
Has she called them? Do they accept her [place] medicaid? Who do we send the referral to? I am not familiar with them since I do not have any patients who go there.

## 2024-10-03 NOTE — TELEPHONE ENCOUNTER
----- Message from Frank sent at 10/3/2024 12:31 PM CDT -----  Contact: Patient  Hafsa Hawley  MRN: [id number]  : 1965  PCP: Cristobal Ann  Home Phone      [phone]  Work Phone      Not on file.  Mobile          [phone]      MESSAGE: requesting referral to [facility] for evaluation to see if she qualifies for their kidney transplant program    Call [phone]    PCP: Seth

## 2024-10-04 ENCOUNTER — INFUSION (OUTPATIENT)
Dept: INFUSION THERAPY | Facility: HOSPITAL | Age: 59
End: 2024-10-04
Attending: STUDENT IN AN ORGANIZED HEALTH CARE EDUCATION/TRAINING PROGRAM
Payer: MEDICAID

## 2024-10-04 VITALS
TEMPERATURE: 97 F | RESPIRATION RATE: 24 BRPM | HEART RATE: 96 BPM | DIASTOLIC BLOOD PRESSURE: 80 MMHG | SYSTOLIC BLOOD PRESSURE: 115 MMHG

## 2024-10-04 NOTE — PROGRESS NOTES
Right upper arm PICC line dressing change done using sterile technique.  No signs of infection noted.  Tolerated well

## 2024-10-07 NOTE — TELEPHONE ENCOUNTER
Spoke to pt, she is going to their facility today for an evaluation. Pt is unsure if they take her insurance but she will call us back with that information. Patient would like a referral for both her kidney and heart when it sent.

## 2024-10-18 DIAGNOSIS — E11.22 TYPE 2 DIABETES MELLITUS WITH STAGE 4 CHRONIC KIDNEY DISEASE, WITH LONG-TERM CURRENT USE OF INSULIN: ICD-10-CM

## 2024-10-18 DIAGNOSIS — Z79.4 TYPE 2 DIABETES MELLITUS WITH STAGE 4 CHRONIC KIDNEY DISEASE, WITH LONG-TERM CURRENT USE OF INSULIN: ICD-10-CM

## 2024-10-18 DIAGNOSIS — N18.4 TYPE 2 DIABETES MELLITUS WITH STAGE 4 CHRONIC KIDNEY DISEASE, WITH LONG-TERM CURRENT USE OF INSULIN: ICD-10-CM

## 2024-10-18 RX ORDER — ALPRAZOLAM 2 MG/1
2 TABLET ORAL 2 TIMES DAILY
Qty: 30 TABLET | Refills: 0 | OUTPATIENT
Start: 2024-10-18

## 2024-10-18 RX ORDER — FERROUS SULFATE 325(65) MG
325 TABLET ORAL
Qty: 60 TABLET | Refills: 11 | Status: CANCELLED | OUTPATIENT
Start: 2024-10-18

## 2024-10-18 NOTE — TELEPHONE ENCOUNTER
LOV: 06/28/2024    Patient requesting refill of: ALPRAZolam (XANAX) 2 MG Tab        Medication Discontinued    
No care due was identified.  [facility] Embedded Care Due Messages. Reference number: [id number].   10/18/2024 12:39:30 PM CDT  
EKG completed

## 2024-10-21 ENCOUNTER — INFUSION (OUTPATIENT)
Dept: INFUSION THERAPY | Facility: HOSPITAL | Age: 59
End: 2024-10-21
Attending: INTERNAL MEDICINE
Payer: MEDICAID

## 2024-10-21 VITALS
SYSTOLIC BLOOD PRESSURE: 95 MMHG | TEMPERATURE: 98 F | HEART RATE: 87 BPM | RESPIRATION RATE: 22 BRPM | DIASTOLIC BLOOD PRESSURE: 66 MMHG

## 2024-10-21 RX ORDER — SEMAGLUTIDE 1.34 MG/ML
1 INJECTION, SOLUTION SUBCUTANEOUS
Qty: 3 ML | Refills: 11 | Status: SHIPPED | OUTPATIENT
Start: 2024-10-21 | End: 2025-10-21

## 2024-10-21 NOTE — PROGRESS NOTES
Right upper arm PICC line dressing change done using sterile technique.  No signs of infection noted.  Stat lock device, bio patch and tegaderm dressing applied  Tolerated well   dc to home in stable condition

## 2024-10-22 ENCOUNTER — CLINICAL SUPPORT (OUTPATIENT)
Dept: CARDIOLOGY | Facility: HOSPITAL | Age: 59
End: 2024-10-22
Attending: INTERNAL MEDICINE
Payer: MEDICAID

## 2024-10-22 ENCOUNTER — CLINICAL SUPPORT (OUTPATIENT)
Dept: CARDIOLOGY | Facility: HOSPITAL | Age: 59
End: 2024-10-22
Payer: MEDICAID

## 2024-10-22 ENCOUNTER — TELEPHONE (OUTPATIENT)
Dept: FAMILY MEDICINE | Facility: CLINIC | Age: 59
End: 2024-10-22
Payer: MEDICAID

## 2024-10-22 DIAGNOSIS — Z95.810 PRESENCE OF AUTOMATIC (IMPLANTABLE) CARDIAC DEFIBRILLATOR: ICD-10-CM

## 2024-10-22 PROCEDURE — 93296 REM INTERROG EVL PM/IDS: CPT | Performed by: INTERNAL MEDICINE

## 2024-10-22 PROCEDURE — 93295 DEV INTERROG REMOTE 1/2/MLT: CPT | Mod: ,,, | Performed by: INTERNAL MEDICINE

## 2024-10-22 NOTE — TELEPHONE ENCOUNTER
----- Message from Frank sent at 10/22/2024 10:56 AM CDT -----  Contact: Patient  Hafsa Hawley  MRN: [id number]  : 1965  PCP: Cristobal Ann  Home Phone      [phone]  Work Phone      Not on file.  Mobile          [phone]      MESSAGE: leg weakness - frequent falls -- requesting appt with Dr Reza    Call [phone]    PCP: Seth

## 2024-10-22 NOTE — TELEPHONE ENCOUNTER
Patient is scheduled to see Dr. Reza on Thursday 10/24 at 1:15PM in [place].    Patient was given ER precautions if symptoms worsen.

## 2024-10-23 DIAGNOSIS — R06.00 DYSPNEA, UNSPECIFIED TYPE: ICD-10-CM

## 2024-10-23 DIAGNOSIS — R05.8 OTHER COUGH: ICD-10-CM

## 2024-10-23 RX ORDER — PROMETHAZINE HYDROCHLORIDE AND CODEINE PHOSPHATE 6.25; 1 MG/5ML; MG/5ML
5 SOLUTION ORAL EVERY 4 HOURS PRN
Qty: 240 ML | Refills: 0 | Status: SHIPPED | OUTPATIENT
Start: 2024-10-23 | End: 2024-11-02

## 2024-10-24 ENCOUNTER — HOSPITAL ENCOUNTER (EMERGENCY)
Facility: HOSPITAL | Age: 59
Discharge: HOME | End: 2024-10-24
Attending: EMERGENCY MEDICINE
Payer: MEDICAID

## 2024-10-24 VITALS
RESPIRATION RATE: 27 BRPM | DIASTOLIC BLOOD PRESSURE: 57 MMHG | BODY MASS INDEX: 30.9 KG/M2 | OXYGEN SATURATION: 99 % | WEIGHT: 192.25 LBS | TEMPERATURE: 99 F | SYSTOLIC BLOOD PRESSURE: 90 MMHG | HEART RATE: 83 BPM | HEIGHT: 66 IN

## 2024-10-24 DIAGNOSIS — S20.212A CONTUSION OF LEFT CHEST WALL, INITIAL ENCOUNTER: ICD-10-CM

## 2024-10-24 DIAGNOSIS — S16.1XXA STRAIN OF NECK MUSCLE, INITIAL ENCOUNTER: ICD-10-CM
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
## 2024-10-24 DIAGNOSIS — S09.90XA CLOSED HEAD INJURY, INITIAL ENCOUNTER: ICD-10-CM

## 2024-10-24 DIAGNOSIS — J69.0 ASPIRATION PNEUMONIA OF LEFT UPPER LOBE, UNSPECIFIED ASPIRATION PNEUMONIA TYPE: ICD-10-CM

## 2024-10-24 DIAGNOSIS — S29.9XXA CHEST WALL TRAUMA: ICD-10-CM

## 2024-10-24 PROBLEM — E11.9 TYPE 2 DIABETES MELLITUS WITHOUT COMPLICATION, WITHOUT LONG-TERM CURRENT USE OF INSULIN: Status: ACTIVE | Noted: 2024-10-24

## 2024-10-24 PROBLEM — D64.9 ANEMIA: Status: ACTIVE | Noted: 2024-10-24

## 2024-10-24 PROBLEM — R29.6 RECURRENT FALLS: Status: ACTIVE | Noted: 2024-10-24

## 2024-10-24 LAB
ALBUMIN SERPL BCP-MCNC: 3.4 G/DL (ref 3.5–5.2)
ALP SERPL-CCNC: 72 U/L (ref 40–150)
ALT SERPL W/O P-5'-P-CCNC: 16 U/L (ref 10–44)
ANION GAP SERPL CALC-SCNC: 16 MMOL/L (ref 8–16)
ANISOCYTOSIS BLD QL SMEAR: ABNORMAL
AST SERPL-CCNC: 22 U/L (ref 10–40)
BASOPHILS # BLD AUTO: 0.07 K/UL (ref 0–0.2)
BASOPHILS NFR BLD: 1.2 % (ref 0–1.9)
BILIRUB SERPL-MCNC: 2.1 MG/DL (ref 0.1–1)
BUN SERPL-MCNC: 113 MG/DL (ref 6–20)
CALCIUM SERPL-MCNC: 9 MG/DL (ref 8.7–10.5)
CHLORIDE SERPL-SCNC: 102 MMOL/L (ref 95–110)
CO2 SERPL-SCNC: 26 MMOL/L (ref 23–29)
CREAT SERPL-MCNC: 4.1 MG/DL (ref 0.5–1.4)
DIFFERENTIAL METHOD BLD: ABNORMAL
EOSINOPHIL # BLD AUTO: 0.1 K/UL (ref 0–0.5)
EOSINOPHIL NFR BLD: 1.8 % (ref 0–8)
ERYTHROCYTE [DISTWIDTH] IN BLOOD BY AUTOMATED COUNT: 31.3 % (ref 11.5–14.5)
EST. GFR  (NO RACE VARIABLE): 12 ML/MIN/1.73 M^2
GLUCOSE SERPL-MCNC: 120 MG/DL (ref 70–110)
HCT VFR BLD AUTO: 31.4 % (ref 37–48.5)
HGB BLD-MCNC: 9.2 G/DL (ref 12–16)
HOWELL-JOLLY BOD BLD QL SMEAR: ABNORMAL
IMM GRANULOCYTES # BLD AUTO: 0.1 K/UL (ref 0–0.04)
IMM GRANULOCYTES NFR BLD AUTO: 1.7 % (ref 0–0.5)
LYMPHOCYTES # BLD AUTO: 1.1 K/UL (ref 1–4.8)
LYMPHOCYTES NFR BLD: 18 % (ref 18–48)
MCH RBC QN AUTO: 17.9 PG (ref 27–31)
MCHC RBC AUTO-ENTMCNC: 29.3 G/DL (ref 32–36)
MCV RBC AUTO: 61 FL (ref 82–98)
MONOCYTES # BLD AUTO: 0.7 K/UL (ref 0.3–1)
MONOCYTES NFR BLD: 10.9 % (ref 4–15)
NEUTROPHILS # BLD AUTO: 4 K/UL (ref 1.8–7.7)
NEUTROPHILS NFR BLD: 66.4 % (ref 38–73)
NRBC BLD-RTO: 18 /100 WBC
OVALOCYTES BLD QL SMEAR: ABNORMAL
PLATELET # BLD AUTO: 159 K/UL (ref 150–450)
PMV BLD AUTO: ABNORMAL FL (ref 9.2–12.9)
POLYCHROMASIA BLD QL SMEAR: ABNORMAL
POTASSIUM SERPL-SCNC: 4.6 MMOL/L (ref 3.5–5.1)
PROT SERPL-MCNC: 6.5 G/DL (ref 6–8.4)
RBC # BLD AUTO: 5.13 M/UL (ref 4–5.4)
SCHISTOCYTES BLD QL SMEAR: ABNORMAL
SODIUM SERPL-SCNC: 144 MMOL/L (ref 136–145)
SPHEROCYTES BLD QL SMEAR: ABNORMAL
TARGETS BLD QL SMEAR: ABNORMAL
WBC # BLD AUTO: 5.95 K/UL (ref 3.9–12.7)

## 2024-10-24 PROCEDURE — 93010 ELECTROCARDIOGRAM REPORT: CPT | Mod: ,,, | Performed by: INTERNAL MEDICINE

## 2024-10-24 PROCEDURE — 36415 COLL VENOUS BLD VENIPUNCTURE: CPT | Performed by: EMERGENCY MEDICINE

## 2024-10-24 PROCEDURE — 85025 COMPLETE CBC W/AUTO DIFF WBC: CPT | Performed by: EMERGENCY MEDICINE

## 2024-10-24 PROCEDURE — 25000003 PHARM REV CODE 250: Performed by: EMERGENCY MEDICINE

## 2024-10-24 PROCEDURE — 99285 EMERGENCY DEPT VISIT HI MDM: CPT | Mod: 25

## 2024-10-24 PROCEDURE — 80053 COMPREHEN METABOLIC PANEL: CPT | Performed by: EMERGENCY MEDICINE

## 2024-10-24 PROCEDURE — 93005 ELECTROCARDIOGRAM TRACING: CPT

## 2024-10-24 RX ORDER — HYDROCODONE BITARTRATE AND ACETAMINOPHEN 5; 325 MG/1; MG/1
1 TABLET ORAL
Status: COMPLETED | OUTPATIENT
Start: 2024-10-24 | End: 2024-10-24

## 2024-10-24 RX ORDER — METHOCARBAMOL 500 MG/1
1000 TABLET, FILM COATED ORAL 3 TIMES DAILY
Qty: 30 TABLET | Refills: 0 | Status: SHIPPED | OUTPATIENT
Start: 2024-10-24 | End: 2024-10-29

## 2024-10-24 RX ORDER — DOXYCYCLINE 100 MG/1
100 CAPSULE ORAL 2 TIMES DAILY
Qty: 20 CAPSULE | Refills: 0 | Status: SHIPPED | OUTPATIENT
Start: 2024-10-24 | End: 2024-11-03

## 2024-10-24 RX ORDER — ONDANSETRON 8 MG/1
8 TABLET, ORALLY DISINTEGRATING ORAL
Status: COMPLETED | OUTPATIENT
Start: 2024-10-24 | End: 2024-10-24

## 2024-10-24 RX ADMIN — ONDANSETRON 8 MG: 8 TABLET, ORALLY DISINTEGRATING ORAL at 02:10

## 2024-10-24 RX ADMIN — HYDROCODONE BITARTRATE AND ACETAMINOPHEN 1 TABLET: 5; 325 TABLET ORAL at 02:10

## 2024-10-25 LAB
OHS QRS DURATION: 118 MS
OHS QTC CALCULATION: 502 MS

## 2024-10-30 DIAGNOSIS — E11.9 TYPE 2 DIABETES MELLITUS WITHOUT COMPLICATION, UNSPECIFIED WHETHER LONG TERM INSULIN USE: ICD-10-CM

## 2024-10-31 ENCOUNTER — TELEPHONE (OUTPATIENT)
Dept: FAMILY MEDICINE | Facility: CLINIC | Age: 59
End: 2024-10-31
Payer: MEDICAID

## 2024-10-31 DIAGNOSIS — M25.561 CHRONIC PAIN OF RIGHT KNEE: ICD-10-CM

## 2024-10-31 DIAGNOSIS — R53.81 DEBILITY: ICD-10-CM

## 2024-10-31 DIAGNOSIS — I42.8 NICM (NONISCHEMIC CARDIOMYOPATHY): Primary | Chronic | ICD-10-CM

## 2024-10-31 DIAGNOSIS — I50.84 END STAGE HEART FAILURE: Chronic | ICD-10-CM

## 2024-10-31 DIAGNOSIS — G89.29 CHRONIC PAIN OF RIGHT KNEE: ICD-10-CM

## 2024-10-31 LAB
OHS CV AF BURDEN PERCENT: < 1
OHS CV DC REMOTE DEVICE TYPE: NORMAL
OHS CV RV PACING PERCENT: 0 %

## 2024-11-04 ENCOUNTER — INFUSION (OUTPATIENT)
Dept: INFUSION THERAPY | Facility: HOSPITAL | Age: 59
End: 2024-11-04
Attending: INTERNAL MEDICINE
Payer: MEDICAID

## 2024-11-04 VITALS
RESPIRATION RATE: 20 BRPM | HEART RATE: 81 BPM | SYSTOLIC BLOOD PRESSURE: 140 MMHG | DIASTOLIC BLOOD PRESSURE: 85 MMHG | TEMPERATURE: 97 F

## 2024-11-04 DIAGNOSIS — I42.9 CARDIOMYOPATHY, UNSPECIFIED TYPE: Primary | ICD-10-CM

## 2024-11-04 DIAGNOSIS — R52 PAIN: ICD-10-CM

## 2024-11-04 LAB
ALBUMIN SERPL BCP-MCNC: 3.6 G/DL (ref 3.5–5.2)
ALP SERPL-CCNC: 67 U/L (ref 40–150)
ALT SERPL W/O P-5'-P-CCNC: 14 U/L (ref 10–44)
ANION GAP SERPL CALC-SCNC: 16 MMOL/L (ref 8–16)
ANISOCYTOSIS BLD QL SMEAR: ABNORMAL
AST SERPL-CCNC: 19 U/L (ref 10–40)
BASOPHILS # BLD AUTO: 0.03 K/UL (ref 0–0.2)
BASOPHILS NFR BLD: 0.6 % (ref 0–1.9)
BILIRUB SERPL-MCNC: 2.8 MG/DL (ref 0.1–1)
BNP SERPL-MCNC: 3051 PG/ML (ref 0–99)
BUN SERPL-MCNC: 106 MG/DL (ref 6–20)
CALCIUM SERPL-MCNC: 9.4 MG/DL (ref 8.7–10.5)
CHLORIDE SERPL-SCNC: 101 MMOL/L (ref 95–110)
CO2 SERPL-SCNC: 27 MMOL/L (ref 23–29)
CREAT SERPL-MCNC: 3.8 MG/DL (ref 0.5–1.4)
DIFFERENTIAL METHOD BLD: ABNORMAL
EOSINOPHIL # BLD AUTO: 0.1 K/UL (ref 0–0.5)
EOSINOPHIL NFR BLD: 1.9 % (ref 0–8)
ERYTHROCYTE [DISTWIDTH] IN BLOOD BY AUTOMATED COUNT: 34.1 % (ref 11.5–14.5)
EST. GFR  (NO RACE VARIABLE): 13 ML/MIN/1.73 M^2
GLUCOSE SERPL-MCNC: 135 MG/DL (ref 70–110)
HCT VFR BLD AUTO: 31.7 % (ref 37–48.5)
HGB BLD-MCNC: 9 G/DL (ref 12–16)
HOWELL-JOLLY BOD BLD QL SMEAR: ABNORMAL
IMM GRANULOCYTES # BLD AUTO: 0.04 K/UL (ref 0–0.04)
IMM GRANULOCYTES NFR BLD AUTO: 0.8 % (ref 0–0.5)
LYMPHOCYTES # BLD AUTO: 0.9 K/UL (ref 1–4.8)
LYMPHOCYTES NFR BLD: 19.5 % (ref 18–48)
MCH RBC QN AUTO: 18.1 PG (ref 27–31)
MCHC RBC AUTO-ENTMCNC: 28.4 G/DL (ref 32–36)
MCV RBC AUTO: 64 FL (ref 82–98)
MONOCYTES # BLD AUTO: 0.5 K/UL (ref 0.3–1)
MONOCYTES NFR BLD: 10.5 % (ref 4–15)
NEUTROPHILS # BLD AUTO: 3.2 K/UL (ref 1.8–7.7)
NEUTROPHILS NFR BLD: 66.7 % (ref 38–73)
NRBC BLD-RTO: 24 /100 WBC
OVALOCYTES BLD QL SMEAR: ABNORMAL
PLATELET # BLD AUTO: 139 K/UL (ref 150–450)
PMV BLD AUTO: ABNORMAL FL (ref 9.2–12.9)
POIKILOCYTOSIS BLD QL SMEAR: ABNORMAL
POLYCHROMASIA BLD QL SMEAR: ABNORMAL
POTASSIUM SERPL-SCNC: 4.2 MMOL/L (ref 3.5–5.1)
PROT SERPL-MCNC: 6.6 G/DL (ref 6–8.4)
RBC # BLD AUTO: 4.98 M/UL (ref 4–5.4)
SCHISTOCYTES BLD QL SMEAR: ABNORMAL
SODIUM SERPL-SCNC: 144 MMOL/L (ref 136–145)
SPHEROCYTES BLD QL SMEAR: ABNORMAL
TARGETS BLD QL SMEAR: ABNORMAL
WBC # BLD AUTO: 4.76 K/UL (ref 3.9–12.7)

## 2024-11-04 PROCEDURE — 85025 COMPLETE CBC W/AUTO DIFF WBC: CPT | Performed by: STUDENT IN AN ORGANIZED HEALTH CARE EDUCATION/TRAINING PROGRAM

## 2024-11-04 PROCEDURE — 36415 COLL VENOUS BLD VENIPUNCTURE: CPT

## 2024-11-04 PROCEDURE — 36415 COLL VENOUS BLD VENIPUNCTURE: CPT | Performed by: STUDENT IN AN ORGANIZED HEALTH CARE EDUCATION/TRAINING PROGRAM

## 2024-11-04 PROCEDURE — 80053 COMPREHEN METABOLIC PANEL: CPT | Performed by: STUDENT IN AN ORGANIZED HEALTH CARE EDUCATION/TRAINING PROGRAM

## 2024-11-04 PROCEDURE — 83880 ASSAY OF NATRIURETIC PEPTIDE: CPT | Performed by: STUDENT IN AN ORGANIZED HEALTH CARE EDUCATION/TRAINING PROGRAM

## 2024-11-04 NOTE — PROGRESS NOTES
Right upper arm PICC line dressing change done using sterile technique.  No signs of infection noted  Stat lock device, bio patch and tegaderm dressing reapplied.  Tolerated well

## 2024-11-05 ENCOUNTER — OFFICE VISIT (OUTPATIENT)
Dept: FAMILY MEDICINE | Facility: CLINIC | Age: 59
End: 2024-11-05
Payer: MEDICAID

## 2024-11-05 VITALS
HEART RATE: 93 BPM | SYSTOLIC BLOOD PRESSURE: 130 MMHG | OXYGEN SATURATION: 92 % | BODY MASS INDEX: 28.37 KG/M2 | DIASTOLIC BLOOD PRESSURE: 70 MMHG | WEIGHT: 176.5 LBS | HEIGHT: 66 IN

## 2024-11-05 DIAGNOSIS — H57.89 EYE IRRITATION: ICD-10-CM

## 2024-11-05 DIAGNOSIS — F41.1 GAD (GENERALIZED ANXIETY DISORDER): ICD-10-CM

## 2024-11-05 DIAGNOSIS — I50.42 CHRONIC COMBINED SYSTOLIC AND DIASTOLIC HEART FAILURE: Chronic | ICD-10-CM

## 2024-11-05 DIAGNOSIS — I70.0 AORTIC ATHEROSCLEROSIS: Primary | ICD-10-CM
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
## 2024-11-05 DIAGNOSIS — I42.8 NICM (NONISCHEMIC CARDIOMYOPATHY): ICD-10-CM

## 2024-11-05 PROCEDURE — 4010F ACE/ARB THERAPY RXD/TAKEN: CPT | Mod: CPTII,,, | Performed by: FAMILY MEDICINE

## 2024-11-05 PROCEDURE — 3075F SYST BP GE 130 - 139MM HG: CPT | Mod: CPTII,,, | Performed by: FAMILY MEDICINE

## 2024-11-05 PROCEDURE — 99999 PR PBB SHADOW E&M-EST. PATIENT-LVL IV: CPT | Mod: PBBFAC,,, | Performed by: FAMILY MEDICINE

## 2024-11-05 PROCEDURE — 3066F NEPHROPATHY DOC TX: CPT | Mod: CPTII,,, | Performed by: FAMILY MEDICINE

## 2024-11-05 PROCEDURE — 3078F DIAST BP <80 MM HG: CPT | Mod: CPTII,,, | Performed by: FAMILY MEDICINE

## 2024-11-05 PROCEDURE — 3008F BODY MASS INDEX DOCD: CPT | Mod: CPTII,,, | Performed by: FAMILY MEDICINE

## 2024-11-05 PROCEDURE — 3044F HG A1C LEVEL LT 7.0%: CPT | Mod: CPTII,,, | Performed by: FAMILY MEDICINE

## 2024-11-05 PROCEDURE — 1159F MED LIST DOCD IN RCRD: CPT | Mod: CPTII,,, | Performed by: FAMILY MEDICINE

## 2024-11-05 PROCEDURE — 1160F RVW MEDS BY RX/DR IN RCRD: CPT | Mod: CPTII,,, | Performed by: FAMILY MEDICINE

## 2024-11-05 PROCEDURE — 99214 OFFICE O/P EST MOD 30 MIN: CPT | Mod: PBBFAC | Performed by: FAMILY MEDICINE

## 2024-11-05 PROCEDURE — 99213 OFFICE O/P EST LOW 20 MIN: CPT | Mod: S$PBB,,, | Performed by: FAMILY MEDICINE

## 2024-11-05 RX ORDER — ALPRAZOLAM 2 MG/1
2 TABLET ORAL 2 TIMES DAILY PRN
Qty: 60 TABLET | Refills: 5 | Status: SHIPPED | OUTPATIENT
Start: 2024-11-05 | End: 2024-12-06

## 2024-11-05 RX ORDER — NEOMYCIN SULFATE, POLYMYXIN B SULFATE, AND DEXAMETHASONE 3.5; 10000; 1 MG/G; [USP'U]/G; MG/G
OINTMENT OPHTHALMIC
Qty: 3.5 G | Refills: 2 | Status: SHIPPED | OUTPATIENT
Start: 2024-11-05

## 2024-11-05 RX ORDER — HYDROCODONE BITARTRATE AND ACETAMINOPHEN 10; 325 MG/1; MG/1
1 TABLET ORAL EVERY 6 HOURS PRN
Qty: 120 TABLET | Refills: 0 | Status: SHIPPED | OUTPATIENT
Start: 2024-11-05
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
## 2024-11-18 ENCOUNTER — OFFICE VISIT (OUTPATIENT)
Dept: TRANSPLANT | Facility: CLINIC | Age: 59
End: 2024-11-18
Payer: MEDICAID

## 2024-11-18 ENCOUNTER — HOSPITAL ENCOUNTER (INPATIENT)
Facility: HOSPITAL | Age: 59
LOS: 7 days | Discharge: HOME | DRG: 291 | End: 2024-11-25
Attending: STUDENT IN AN ORGANIZED HEALTH CARE EDUCATION/TRAINING PROGRAM | Admitting: STUDENT IN AN ORGANIZED HEALTH CARE EDUCATION/TRAINING PROGRAM
Payer: MEDICAID

## 2024-11-18 VITALS
HEIGHT: 66 IN | HEART RATE: 85 BPM | SYSTOLIC BLOOD PRESSURE: 107 MMHG | BODY MASS INDEX: 31 KG/M2 | DIASTOLIC BLOOD PRESSURE: 65 MMHG | WEIGHT: 192.88 LBS

## 2024-11-18 DIAGNOSIS — N18.4 TYPE 2 DIABETES MELLITUS WITH STAGE 4 CHRONIC KIDNEY DISEASE, WITHOUT LONG-TERM CURRENT USE OF INSULIN: Chronic | ICD-10-CM

## 2024-11-18 DIAGNOSIS — Z71.89 ACP (ADVANCE CARE PLANNING): ICD-10-CM

## 2024-11-18 DIAGNOSIS — Z51.5 PALLIATIVE CARE ENCOUNTER: ICD-10-CM

## 2024-11-18 DIAGNOSIS — E11.22 TYPE 2 DIABETES MELLITUS WITH STAGE 4 CHRONIC KIDNEY DISEASE, WITHOUT LONG-TERM CURRENT USE OF INSULIN: Chronic | ICD-10-CM

## 2024-11-18 DIAGNOSIS — I13.0 HYPERTENSIVE CARDIOVASCULAR-RENAL DISEASE, STAGE 1-4 OR UNSPECIFIED CHRONIC KIDNEY DISEASE, WITH HEART FAILURE: ICD-10-CM

## 2024-11-18 DIAGNOSIS — F41.9 ANXIETY: ICD-10-CM

## 2024-11-18 DIAGNOSIS — I50.43 ACUTE ON CHRONIC COMBINED SYSTOLIC AND DIASTOLIC HEART FAILURE: Primary | Chronic | ICD-10-CM

## 2024-11-18 DIAGNOSIS — E11.9 TYPE 2 DIABETES MELLITUS WITHOUT COMPLICATION, WITHOUT LONG-TERM CURRENT USE OF INSULIN: ICD-10-CM

## 2024-11-18 DIAGNOSIS — R52 PAIN: ICD-10-CM

## 2024-11-18 DIAGNOSIS — I10 ESSENTIAL HYPERTENSION: Chronic | ICD-10-CM

## 2024-11-18 DIAGNOSIS — R07.9 CHEST PAIN: ICD-10-CM

## 2024-11-18 DIAGNOSIS — I50.42 CHRONIC COMBINED SYSTOLIC AND DIASTOLIC HEART FAILURE: Primary | Chronic | ICD-10-CM

## 2024-11-18 DIAGNOSIS — Z95.810 ICD (IMPLANTABLE CARDIOVERTER-DEFIBRILLATOR) IN PLACE: Chronic | ICD-10-CM

## 2024-11-18 DIAGNOSIS — I50.9 CHF (CONGESTIVE HEART FAILURE): ICD-10-CM

## 2024-11-18 DIAGNOSIS — I50.43 ACUTE ON CHRONIC COMBINED SYSTOLIC (CONGESTIVE) AND DIASTOLIC (CONGESTIVE) HEART FAILURE: ICD-10-CM

## 2024-11-18 DIAGNOSIS — R06.00 DYSPNEA, UNSPECIFIED TYPE: ICD-10-CM

## 2024-11-18 PROBLEM — N17.9 AKI (ACUTE KIDNEY INJURY): Status: RESOLVED | Noted: 2021-10-28 | Resolved: 2024-11-18

## 2024-11-18 PROBLEM — I50.84 END STAGE HEART FAILURE: Chronic | Status: RESOLVED | Noted: 2024-07-24 | Resolved: 2024-11-18

## 2024-11-18 PROBLEM — E78.5 HYPERLIPIDEMIA ASSOCIATED WITH TYPE 2 DIABETES MELLITUS: Chronic | Status: ACTIVE | Noted: 2024-02-01

## 2024-11-18 PROBLEM — F11.20 UNCOMPLICATED OPIOID DEPENDENCE: Chronic | Status: ACTIVE | Noted: 2024-11-18

## 2024-11-18 PROBLEM — E11.69 HYPERLIPIDEMIA ASSOCIATED WITH TYPE 2 DIABETES MELLITUS: Chronic | Status: ACTIVE | Noted: 2024-02-01

## 2024-11-18 LAB — POCT GLUCOSE: 87 MG/DL (ref 70–110)

## 2024-11-18 PROCEDURE — 3044F HG A1C LEVEL LT 7.0%: CPT | Mod: CPTII,,, | Performed by: INTERNAL MEDICINE

## 2024-11-18 PROCEDURE — 3074F SYST BP LT 130 MM HG: CPT | Mod: CPTII,,, | Performed by: INTERNAL MEDICINE

## 2024-11-18 PROCEDURE — 3078F DIAST BP <80 MM HG: CPT | Mod: CPTII,,, | Performed by: INTERNAL MEDICINE

## 2024-11-18 PROCEDURE — 25000003 PHARM REV CODE 250: Performed by: STUDENT IN AN ORGANIZED HEALTH CARE EDUCATION/TRAINING PROGRAM

## 2024-11-18 PROCEDURE — 36415 COLL VENOUS BLD VENIPUNCTURE: CPT | Performed by: STUDENT IN AN ORGANIZED HEALTH CARE EDUCATION/TRAINING PROGRAM

## 2024-11-18 PROCEDURE — 99999 PR PBB SHADOW E&M-EST. PATIENT-LVL IV: CPT | Mod: PBBFAC,,, | Performed by: INTERNAL MEDICINE

## 2024-11-18 PROCEDURE — 20600001 HC STEP DOWN PRIVATE ROOM

## 2024-11-18 PROCEDURE — 99214 OFFICE O/P EST MOD 30 MIN: CPT | Mod: PBBFAC | Performed by: INTERNAL MEDICINE

## 2024-11-18 PROCEDURE — 99214 OFFICE O/P EST MOD 30 MIN: CPT | Mod: S$PBB,,, | Performed by: INTERNAL MEDICINE

## 2024-11-18 PROCEDURE — 1159F MED LIST DOCD IN RCRD: CPT | Mod: CPTII,,, | Performed by: INTERNAL MEDICINE

## 2024-11-18 PROCEDURE — 3008F BODY MASS INDEX DOCD: CPT | Mod: CPTII,,, | Performed by: INTERNAL MEDICINE

## 2024-11-18 PROCEDURE — 63600175 PHARM REV CODE 636 W HCPCS: Performed by: STUDENT IN AN ORGANIZED HEALTH CARE EDUCATION/TRAINING PROGRAM

## 2024-11-18 PROCEDURE — 83605 ASSAY OF LACTIC ACID: CPT | Performed by: STUDENT IN AN ORGANIZED HEALTH CARE EDUCATION/TRAINING PROGRAM

## 2024-11-18 PROCEDURE — 3066F NEPHROPATHY DOC TX: CPT | Mod: CPTII,,, | Performed by: INTERNAL MEDICINE

## 2024-11-18 PROCEDURE — 4010F ACE/ARB THERAPY RXD/TAKEN: CPT | Mod: CPTII,,, | Performed by: INTERNAL MEDICINE

## 2024-11-18 RX ORDER — SODIUM CHLORIDE 0.9 % (FLUSH) 0.9 %
1-10 SYRINGE (ML) INJECTION EVERY 12 HOURS PRN
Status: DISCONTINUED | OUTPATIENT
Start: 2024-11-18 | End: 2024-11-21

## 2024-11-18 RX ORDER — ISOSORBIDE MONONITRATE 30 MG/1
30 TABLET, EXTENDED RELEASE ORAL DAILY
Status: DISCONTINUED | OUTPATIENT
Start: 2024-11-19 | End: 2024-11-25 | Stop reason: HOSPADM

## 2024-11-18 RX ORDER — LANOLIN ALCOHOL/MO/W.PET/CERES
1 CREAM (GRAM) TOPICAL DAILY
Status: DISCONTINUED | OUTPATIENT
Start: 2024-11-19 | End: 2024-11-25 | Stop reason: HOSPADM

## 2024-11-18 RX ORDER — IBUPROFEN 200 MG
16 TABLET ORAL
Status: DISCONTINUED | OUTPATIENT
Start: 2024-11-18 | End: 2024-11-25 | Stop reason: HOSPADM

## 2024-11-18 RX ORDER — HYDROCODONE BITARTRATE AND ACETAMINOPHEN 10; 325 MG/1; MG/1
1 TABLET ORAL EVERY 6 HOURS PRN
Status: DISCONTINUED | OUTPATIENT
Start: 2024-11-18 | End: 2024-11-25 | Stop reason: HOSPADM

## 2024-11-18 RX ORDER — ALUMINUM HYDROXIDE, MAGNESIUM HYDROXIDE, AND SIMETHICONE 1200; 120; 1200 MG/30ML; MG/30ML; MG/30ML
30 SUSPENSION ORAL 4 TIMES DAILY PRN
Status: DISCONTINUED | OUTPATIENT
Start: 2024-11-18 | End: 2024-11-25 | Stop reason: HOSPADM

## 2024-11-18 RX ORDER — SEVELAMER CARBONATE 800 MG/1
1600 TABLET, FILM COATED ORAL
Status: DISCONTINUED | OUTPATIENT
Start: 2024-11-19 | End: 2024-11-25 | Stop reason: HOSPADM

## 2024-11-18 RX ORDER — ATORVASTATIN CALCIUM 20 MG/1
40 TABLET, FILM COATED ORAL NIGHTLY
Status: DISCONTINUED | OUTPATIENT
Start: 2024-11-18 | End: 2024-11-25 | Stop reason: HOSPADM

## 2024-11-18 RX ORDER — BENZONATATE 100 MG/1
100 CAPSULE ORAL 3 TIMES DAILY PRN
Status: DISCONTINUED | OUTPATIENT
Start: 2024-11-18 | End: 2024-11-25 | Stop reason: HOSPADM

## 2024-11-18 RX ORDER — AMIODARONE HYDROCHLORIDE 200 MG/1
200 TABLET ORAL DAILY
Status: DISCONTINUED | OUTPATIENT
Start: 2024-11-19 | End: 2024-11-25 | Stop reason: HOSPADM

## 2024-11-18 RX ORDER — BUMETANIDE 0.25 MG/ML
2 INJECTION, SOLUTION INTRAMUSCULAR; INTRAVENOUS ONCE
Status: COMPLETED | OUTPATIENT
Start: 2024-11-18 | End: 2024-11-18

## 2024-11-18 RX ORDER — IPRATROPIUM BROMIDE AND ALBUTEROL SULFATE 2.5; .5 MG/3ML; MG/3ML
3 SOLUTION RESPIRATORY (INHALATION) EVERY 6 HOURS PRN
Status: DISCONTINUED | OUTPATIENT
Start: 2024-11-18 | End: 2024-11-25 | Stop reason: HOSPADM

## 2024-11-18 RX ORDER — ONDANSETRON 8 MG/1
8 TABLET, ORALLY DISINTEGRATING ORAL EVERY 8 HOURS PRN
Status: DISCONTINUED | OUTPATIENT
Start: 2024-11-18 | End: 2024-11-25 | Stop reason: HOSPADM

## 2024-11-18 RX ORDER — TALC
6 POWDER (GRAM) TOPICAL NIGHTLY PRN
Status: DISCONTINUED | OUTPATIENT
Start: 2024-11-18 | End: 2024-11-25 | Stop reason: HOSPADM

## 2024-11-18 RX ORDER — SIMETHICONE 80 MG
1 TABLET,CHEWABLE ORAL 4 TIMES DAILY PRN
Status: DISCONTINUED | OUTPATIENT
Start: 2024-11-18 | End: 2024-11-25 | Stop reason: HOSPADM

## 2024-11-18 RX ORDER — ALPRAZOLAM 0.5 MG/1
0.5 TABLET ORAL DAILY PRN
Status: DISCONTINUED | OUTPATIENT
Start: 2024-11-18 | End: 2024-11-25 | Stop reason: HOSPADM

## 2024-11-18 RX ORDER — SODIUM CHLORIDE 0.9 % (FLUSH) 0.9 %
10 SYRINGE (ML) INJECTION
Status: DISCONTINUED | OUTPATIENT
Start: 2024-11-18 | End: 2024-11-25 | Stop reason: HOSPADM

## 2024-11-18 RX ORDER — NALOXONE HCL 0.4 MG/ML
0.02 VIAL (ML) INJECTION
Status: DISCONTINUED | OUTPATIENT
Start: 2024-11-18 | End: 2024-11-25 | Stop reason: HOSPADM

## 2024-11-18 RX ORDER — ACETAMINOPHEN 325 MG/1
650 TABLET ORAL EVERY 4 HOURS PRN
Status: DISCONTINUED | OUTPATIENT
Start: 2024-11-18 | End: 2024-11-21

## 2024-11-18 RX ORDER — DOBUTAMINE HYDROCHLORIDE 400 MG/100ML
5 INJECTION INTRAVENOUS CONTINUOUS
Status: DISCONTINUED | OUTPATIENT
Start: 2024-11-18 | End: 2024-11-25 | Stop reason: HOSPADM

## 2024-11-18 RX ORDER — IBUPROFEN 200 MG
24 TABLET ORAL
Status: DISCONTINUED | OUTPATIENT
Start: 2024-11-18 | End: 2024-11-25 | Stop reason: HOSPADM

## 2024-11-18 RX ORDER — GLUCAGON 1 MG
1 KIT INJECTION
Status: DISCONTINUED | OUTPATIENT
Start: 2024-11-18 | End: 2024-11-25 | Stop reason: HOSPADM

## 2024-11-18 RX ORDER — ACETAMINOPHEN 325 MG/1
650 TABLET ORAL EVERY 8 HOURS PRN
Status: DISCONTINUED | OUTPATIENT
Start: 2024-11-18 | End: 2024-11-21

## 2024-11-18 RX ORDER — ASPIRIN 81 MG/1
81 TABLET ORAL DAILY
Status: DISCONTINUED | OUTPATIENT
Start: 2024-11-19 | End: 2024-11-25 | Stop reason: HOSPADM

## 2024-11-18 RX ORDER — POLYETHYLENE GLYCOL 3350 17 G/17G
17 POWDER, FOR SOLUTION ORAL DAILY PRN
Status: DISCONTINUED | OUTPATIENT
Start: 2024-11-18 | End: 2024-11-25 | Stop reason: HOSPADM

## 2024-11-18 RX ADMIN — BUMETANIDE 2 MG: 0.25 INJECTION INTRAMUSCULAR; INTRAVENOUS at 09:11

## 2024-11-18 RX ADMIN — ATORVASTATIN CALCIUM 40 MG: 20 TABLET, FILM COATED ORAL at 08:11

## 2024-11-18 RX ADMIN — HYDROCODONE BITARTRATE AND ACETAMINOPHEN 1 TABLET: 10; 325 TABLET ORAL at 09:11

## 2024-11-18 RX ADMIN — APIXABAN 2.5 MG: 2.5 TABLET, FILM COATED ORAL at 09:11

## 2024-11-18 RX ADMIN — DOBUTAMINE HYDROCHLORIDE 5 MCG/KG/MIN: 400 INJECTION INTRAVENOUS at 08:11

## 2024-11-18 NOTE — LETTER
November 18, 2024        Benson Cortez  [address] [facility] [place] [address]  Phone: [phone]  Fax: [phone]     Krysta Tony  [address] [address] [address]  Phone: [phone]  Fax: [phone]     Andrea Pacheco  [address] [facility]  [address]  [place] [address]  Phone: [phone]  Fax: [phone]             [facility]  [address]  [place] [address]  Phone: [phone]   Patient: Hafsa Hawley   MR Number: [id number]   YOB: 1965   Date of Visit: 11/18/2024       Dear Dr. Andrea Pacheco, Benson Cortez, Krysta Tony    Thank you for referring Hafsa Hawley to me for evaluation. Attached you will find relevant portions of my assessment and plan of care.    If you have questions, please do not hesitate to call me. I look forward to following Hafsa Hawley along with you.    Sincerely,    Burke Camilo MD    Enclosure    If you would like to receive this communication electronically, please contact [email or web address] or [phone] to request [employer] access.    [employer] is a tool which provides read-only access to select patient information with whom you have a relationship. Its easy to use and provides real time access to review your patients record including encounter summaries, notes, results, and demographic information.    If you feel you have received this communication in error or would no longer like to receive these types of communications, please e-mail [email or web address]

## 2024-11-18 NOTE — PROGRESS NOTES
Advanced Heart Failure and Transplantation Clinic Follow up.      Attending Physician: Burke Camilo MD.  The patient's last visit with me was on 9/5/2024.         JENNIFER Hawley is a 59 y.o. year old Black or  female who has presented to be evaluated as a potential heart transplant recipient.       60 yo BF with CHF since 2013 due to NICM, pulmonary hypertension, DM, HTN, permanent AF and ICD.  She was previously evaluated by our team and presented to committee on 3/9/22.  At the time she was declined for OHT or LVAD due to renal function, lung function and difficulty consistently following up with the team.   She returns for f/u visit as undergoing evaluation for heart-kidney transplant.  She does not have her medications with her.  She is not have a medication list of her home medicines with her.  She does not know any of her medications by name except Lasix.  Was described as Lasix 80 mg twice daily but she reports she takes the morning dose but only use the evening dose if she feels the need to do so.  I asked her how she knows without a scale she said she just bases this on her abdominal bloating and any edema.  She reports that her scale is broken and she has not gotten a new 1 so she has no daily weights to report.  She is accompanied by her granddaughter.  She tells me if she was approved for transplant her primary caregiver will be Jeanie Jaimes (sister and Laura Jaimes her niece.  She is going to stay in Laura's house after her transplant, if approved.     She saw Dr. Guardado in January 2023 at which time sent to the ED and admitted to [facility] 1/3/23.  She was treated for ADHF with IV lasix 80 mg IV BID and [facility] consulted.  At that time it was noted that she had run out of metoprolol for 1 week PTA.  She was readmitted to [facility] few weeks later with ADHF and at discharge told to see [facility].  She  came Feb 3, 2023 to see Dr. Guardado but was sent to ED with nausea and vomiting.  There treated with IV lasix and sent home from ED.  She comes today for f/u visit.  Of note per Dr. Guardado she was seen by Pulm in Jan 2023 and they felt that there was no overt evidence of ILD or COPD on PFTs and CT chest.  They felt that the etiology of her chronic hypoxic and hypercapnic respiratory failure was not clear.     I saw her February 16, 2023 at which time she reported that her shortness of breath was stable and described NYHA class 3 symptoms.  At this visit I was very concerned that her lung disease is her limiting factor based upon the desaturation with exercise not just today but also on the day of her CPX.  On the CPX in December her breathing reserve was borderline reduced.  An elevated VCO2 slope can occur with lung disease as well as heart failure.  I suspect she has a combination of lung disease and heart failure that is limiting her ability to be physically active and resulting in her high level of symptomology.  I note on her problem list a diagnosis of chronic respiratory hypoxic and hypercapnic respiratory failure.  I did not see a blood gas to confirm this diagnosis so requested that one be obtained.     She saw Dr. Chacko February 27, 2023 note not yet signed but at end of current note he mentions his concern regarding PFT's.  On 2/27/2023 PFT's read as reduced FEV1/SVC ratio (66%) and low FEF 25-75 suggest mild obstruction on spirometry. Lung volumes show moderate restriction is present. Overall spirometry shows severe ventilatory impairment. DLCO is moderately decreased. MVV is severely decreased. On 2/27/2023 ABG 7.46/pCO2 was 39 and pO2 was 73 and these ABG's do not reflect the previously described chronic hypoxic and hypercapnic respiratory failure.     She sees Dr. Ángel Elizabeth as Pulm at [facility].  His notes describe on PFTs December 22, 2021 severe restriction with moderately reduced DLCO.  He stated on  "his note of December 8, 2022 that she was on home oxygen felt she was benefitting from it.  However he does concludes that there is no evidence of ILD or COPD on PFTs/CT chest.   On August 1, 2022 he documented in an addendum that was signed March 24, 2022 that the patient had chronic respiratory failure with hypoxia and hypercapnia:  Patient is on home oxygen and is benefitting from it.   I found a note from February 16, 2022 under assessment he describes chronic respiratory failure with hypoxia and hypercapnia.  He reports that she was trying to wean herself off oxygen."     Her PFTs today 03/15/2023 demonstrates severe restriction and severely reduced MVV there is also the possibility of mild obstruction per report.  In February 27,2023 she has a blood gas reportedly on room air that is normal without CO2 retention (7.46/39/73).     She was presented to selection committee again on 3/29/23 and was declined for Heart Transplant listing due to poor medical adherence, declined pulmonary function, and declined renal function.       Her symptoms of heart failure include shortness of breath and sleeping on 2 pillows.  Though she has deteriorated regarding renal function she feels that her shortness of breath is a little bit better today than when she left the hospital last week.     Today May 29, 2023, patient comes after 4 day hospitalization for volume overload. Patient was started in low dose dobutamine. She is no longer under consideration for any advanced cardiac therapies.     September 5, 2024: she has not had success on going to another center for evaluation for heart/kidney transplant. She continues to struggle with HF, multiple readmissions and poor functional status.     November 18, 2024: not doing well. [place] congestive symptoms.           Review of Systems   Constitutional:  Positive for activity change, appetite change, fatigue and unexpected weight change. Negative for chills, diaphoresis and " fever.   HENT:  Negative for nasal congestion, rhinorrhea and sore throat.    Eyes:  Negative for visual disturbance.   Respiratory:  Positive for cough and shortness of breath.    Cardiovascular:  Positive for leg swelling. Negative for chest pain.   Gastrointestinal:  Positive for abdominal distention. Negative for abdominal pain, diarrhea, nausea and vomiting.   Genitourinary:  Negative for difficulty urinating, dysuria and hematuria.   Integumentary:  Negative for rash.   Neurological:  Negative for seizures, syncope and light-headedness.   Psychiatric/Behavioral:  Negative for agitation and hallucinations.         Past Medical History:   Diagnosis Date    Allergy     Anemia     Arthritis     Atrial fibrillation     OAC    BMI 32.0-32.9,adult 02/22/2023    Chronic respiratory failure with hypoxia, on home oxygen therapy     2L with activity, off at rest.  Per Pulm  no overt evidence of ILD or COPD on PFTs and CT to explain O2 needs.    CKD (chronic kidney disease), stage IV 05/08/2018    Congestive heart failure     s/p AICD placement,    Deep vein thrombosis     Depression     elevated bilirubin d/t Gilbert's syndrome     confirmed by [place] genetic testing, evaluated by hepatology    Encounter for blood transfusion     GERD (gastroesophageal reflux disease)     Hypertension     Pheochromocytoma, malignant     Right kidney mass     Sleep apnea     Thalassemia trait, alpha     Thyroid disease     Type 2 diabetes mellitus with hyperglycemia, without long-term current use of insulin 08/13/2020        Past Surgical History:   Procedure Laterality Date    ANGIOGRAM, ABDOMINAL AORTA Right 04/15/2024    APPENDECTOMY      BONE MARROW BIOPSY      CARDIAC DEFIBRILLATOR PLACEMENT Left 12/2016    CARDIAC ELECTROPHYSIOLOGY MAPPING AND ABLATION      CARDIAC ELECTROPHYSIOLOGY MAPPING AND ABLATION      COLONOSCOPY N/A 05/06/2022    Procedure: COLONOSCOPY;  Surgeon: Arely Betancourt MD;  Location: [facility] ([address] [place] [address]);  Service:  Endoscopy;  Laterality: N/A;  heart transplant candidate/ EF 25% - 2nd floor/ defib - Biotronik - ERW  Eliquis - per Dr. Cortez with [facility], Pt ok to hold Eliquis x 2 days prior-see media tab-outside correspondence dated 21  - ER[facility]  verbal instructions/portal instructions/email instructions - s    EYE SURGERY      due to running tears    FRACTURE SURGERY Left     hand 5th digit    HYSTERECTOMY      KNEE SURGERY Left 2016    hematoma    LIVER BIOPSY  10/24/2018    Minimal steatosis, predominantly macrovesicular, 1%, Minimal nonspecific portal inflammation, no fibrosis. No findings on biopsy to explain elevated bilirubin levels. Could be d/t Gilbert's =?- hemolysis    RIGHT HEART CATHETERIZATION Right 2021    Procedure: INSERTION, CATHETER, RIGHT HEART;  Surgeon: Irving Cardenas MD;  Location: [facility] CATH LAB;  Service: Cardiology;  Laterality: Right;    RIGHT HEART CATHETERIZATION Right 2022    Procedure: INSERTION, CATHETER, RIGHT HEART;  Surgeon: Burke Camilo MD;  Location: [facility] CATH LAB;  Service: Cardiology;  Laterality: Right;    RIGHT HEART CATHETERIZATION Right 2023    Procedure: INSERTION, CATHETER, RIGHT HEART;  Surgeon: Katie Liriano DO;  Location: [facility] CATH LAB;  Service: Cardiology;  Laterality: Right;    TRANSJUGULAR BIOPSY OF LIVER N/A 10/24/2018    Procedure: BIOPSY, LIVER, TRANSJUGULAR APPROACH;  Surgeon: Carmen Diagnostic Provider;  Location: [facility] OR [address] [place] [address];  Service: Radiology;  Laterality: N/A;        Family History   Problem Relation Name Age of Onset    Cancer Mother          pancreatic CA early 50's    Heart disease Father           MI in late 50's    Hypertension Father      Heart attack Father      Heart disease Sister      Heart disease Brother      Cirrhosis Brother          alcoholic    Heart disease Sister      Heart disease Brother      Hypertension Brother      Diabetes Brother          Review of patient's allergies indicates:   Allergen  "Reactions    Penicillins Hives and Other (See Comments)    Iodinated contrast media Nausea And Vomiting    Oxycodone-acetaminophen Other (See Comments)     Nausea, Dizziness, Anxiety.  "I don't like how it makes me feel."   Given Hydromorphone 0.5mg IVP  Without problems.  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 Metabolism of other drugs not listed here may also be impacted by UGT1A1 enzyme activity.       Tramadol Nausea And Vomiting and Other (See Comments)     Other reaction(s): Other (See Comments)    Valsartan Other (See Comments)        Current Outpatient Medications   Medication Instructions    albuterol-ipratropium (DUO-NEB) 2.5 mg-0.5 mg/3 mL nebulizer solution 3 mLs, Nebulization, Every 6 hours PRN    ALPRAZolam (XANAX) 2 mg, Oral, 2 times daily PRN    amiodarone (PACERONE) 200 MG Tab take one tablet by mouth daily    amiodarone (PACERONE) 200 MG Tab Take 1 tablet (200 mg total) by mouth every 12 (twelve) hours for 30 days. Take twice daily until 11/10, then transition to once daily unless otherwise instructed by your cardiologist.    apixaban (ELIQUIS) 2.5 mg Tab take one tablet by mouth twice a day    apixaban (ELIQUIS) 5 mg Tab Take 1 tablet (5 mg total) by mouth 2 (two) times a day for 30 days.    aspirin (ECOTRIN) 81 mg, Daily    atorvastatin (LIPITOR) 40 MG tablet take one tablet by mouth in the evening    bumetanide (BUMEX) 2 MG tablet Take 1 tablet " (2 mg total) by mouth 2 (two) times a day for 30 days.    bumetanide (BUMEX) 4 mg, Oral, 2 times daily    cetirizine (ZYRTEC) 10 mg, Oral, Daily    cyanocobalamin (VITAMIN B-12) 1,000 mcg, Daily    DOBUTamine (DOBUTREX) 1,000 mg/250 mL (4,000 mcg/mL) infusion 5 mcg/kg/min, Intravenous, Continuous    empagliflozin (JARDIANCE) 25 mg tablet take one tablet by mouth daily    ergocalciferol (VITAMIN D2) 50,000 unit Cap Take 1 capsule orally once a  week    ferrous sulfate 325 mg, Daily    fluticasone propionate (FLONASE ALLERGY RELIEF) 50 mcg/actuation nasal spray Use 2 sprays (100 mcg total) by Each Nostril route once daily.    glimepiride (AMARYL) 1 mg, Oral, Before breakfast    HYDROcodone-acetaminophen (NORCO)  mg per tablet 1 tablet, Oral, Every 6 hours PRN    hydrOXYzine HCL (ATARAX) 25 MG tablet take one tablet orally four times a day as needed for itching    isosorbide-hydrALAZINE 20-37.5 mg (BIDIL) 20-37.5 mg Tab 1 tablet, Oral, 3 times daily    isosorbide-hydrALAZINE 20-37.5 mg (BIDIL) 20-37.5 mg Tab take 1 tablet orally twice a day    LIDOcaine (LIDODERM) 5 % Place 1 patch onto the skin once daily. Remove & discard patch within 12 hours or as directed by MD.    metOLazone (ZAROXOLYN) 5 mg, Oral, Daily PRN    metoprolol succinate (TOPROL-XL) 12.5 mg, Oral, Daily    neomycin-polymyxin-dexamethasone (DEXACINE) 3.5 mg/g-10,000 unit/g-0.1 % Oint Place 1-2 drops into each eye three times daily for 7-10 days    nitroGLYCERIN (NITROSTAT) 0.4 MG SL tablet Put 1 tab under the tongue every 5 minutes x 3 doses as needed for chest pain. Do not exceed 3 doses in 15 minutes. If no relief, go to ER.    ondansetron (ZOFRAN-ODT) 8 MG TbDL Dissolve 1 tablet (8 mg total) by mouth every 8 (eight) hours as needed (nausea).    pantoprazole (PROTONIX) 40 MG tablet Take one tablet by mouth daily    sevelamer carbonate (RENVELA) 800 mg Tab take FOUR tablets orally three times a day with meals    SPIRIVA WITH HANDIHALER 18 mcg,  "Inhalation, Daily    torsemide (DEMADEX) 100 MG Tab take one tablet by mouth daily in the morning    TYLENOL ARTHRITIS PAIN 650 mg, Oral, Every 8 hours    VENTOLIN HFA 90 mcg/actuation inhaler 2 puffs, Inhalation, Every 6 hours PRN        Vitals:    11/18/24 1526   BP: 107/65   Pulse: 85        Wt Readings from Last 3 Encounters:   11/18/24 87.5 kg (192 lb 14.4 oz)   11/05/24 80 kg (176 lb 7.7 oz)   10/24/24 87.2 kg (192 lb 3.9 oz)     Temp Readings from Last 3 Encounters:   11/04/24 97 °F (36.1 °C) (Skin)   10/24/24 98.6 °F (37 °C) (Oral)   10/21/24 97.6 °F (36.4 °C) (Skin)     BP Readings from Last 3 Encounters:   11/18/24 107/65   11/05/24 130/70   11/04/24 (!) 140/85     Pulse Readings from Last 3 Encounters:   11/18/24 85   11/05/24 93   11/04/24 81        Body mass index is 31.14 kg/m². Estimated body surface area is 2.02 meters squared as calculated from the following:    Height as of this encounter: 5' 6" (1.676 m).    Weight as of this encounter: 87.5 kg (192 lb 14.4 oz).     Physical Exam  Constitutional:       Appearance: She is well-developed.   HENT:      Head: Normocephalic and atraumatic.      Right Ear: External ear normal.      Left Ear: External ear normal.   Eyes:      Conjunctiva/sclera: Conjunctivae normal.      Pupils: Pupils are equal, round, and reactive to light.   Neck:      Vascular: Hepatojugular reflux and JVD present.      Comments: JVP 18 cmH20  Cardiovascular:      Rate and Rhythm: Normal rate and regular rhythm.      Pulses: Intact distal pulses.      Heart sounds: S1 normal and S2 normal. No murmur heard.     No friction rub. No gallop.   Pulmonary:      Effort: Pulmonary effort is normal.      Breath sounds: Normal breath sounds.   Abdominal:      General: Bowel sounds are normal. There is no distension.      Palpations: Abdomen is soft.      Tenderness: There is no abdominal tenderness. There is no guarding or rebound.   Musculoskeletal:      Cervical back: Normal range of motion " and neck supple.      Right lower leg: Edema present.      Left lower leg: Edema present.   Neurological:      Mental Status: She is alert and oriented to person, place, and time.          Lab Results   Component Value Date    BNP 3,051 (H) 11/04/2024     11/04/2024    K 4.2 11/04/2024    MG 2.1 09/16/2024     11/04/2024    CO2 27 11/04/2024     (H) 11/04/2024    CREATININE 3.8 (H) 11/04/2024     (H) 11/04/2024    HGBA1C 5.7 (H) 10/10/2024    HGBA1C 5.2 07/24/2024    AST 19 11/04/2024    ALT 14 11/04/2024    ALBUMIN 3.6 11/04/2024    PROT 6.6 11/04/2024    BILITOT 2.8 (H) 11/04/2024    WBC 4.76 11/04/2024    HGB 9.0 (L) 11/04/2024    HCT 31.7 (L) 11/04/2024    HCT 45 12/07/2021     (L) 11/04/2024    INR 1.0 05/06/2024     (H) 01/04/2023    TSH 2.121 09/05/2024    CHOL 136 01/04/2023    HDL 63 01/04/2023    LDLCALC 57.8 (L) 01/04/2023    TRIG 76 01/04/2023    T5WFXGF 8.4 09/11/2017    FREET4 1.07 05/09/2018         Results for orders placed during the hospital encounter of 05/05/24    Echo    Interpretation Summary    Left Ventricle: The left ventricle is severely dilated. Severely increased ventricular mass. Normal wall thickness. There is severe eccentric hypertrophy. Severe global hypokinesis present. There is severely reduced systolic function with a visually estimated ejection fraction of 10 -15%. Grade II diastolic dysfunction. Elevated left ventricular filling pressure. No thrombus present.    Right Ventricle: Normal right ventricular cavity size. Wall thickness is normal. Right ventricle wall motion  is normal. Systolic function is mildly reduced. Pacemaker lead present in the ventricle.    Left Atrium: Left atrium is severely dilated.    Right Atrium: Normal right atrial size.    Aortic Valve: The aortic valve is a trileaflet valve. There is mild annular calcification present. There is mild aortic regurgitation.    Mitral Valve: There is moderate to severe  regurgitation with an eccentric jet and a wall hugging jet.    Tricuspid Valve: There is mild regurgitation.    Pulmonic Valve: There is mild regurgitation.    Aorta: Aortic root is normal in size measuring 2.75 cm. Ascending aorta is normal measuring 3.49 cm.    Pulmonary Artery: The estimated pulmonary artery systolic pressure is 44 mmHg.    IVC/SVC: Normal venous pressure at 3 mmHg.        Results for orders placed during the hospital encounter of 03/26/23    Cardiac catheterization    Conclusion  Summary  Filling pressures: RA 9, PCWP 16  PA 58/29, mean PA pressure 39 mmHg  PA saturation 45%, Ao saturation 90% on RA  PVR 5.6 BOLDEN, consistent with pre and post-capillary pulmonary hypertension  Gillian CO/CI: 4.1/2.03  SVR: 1151  BP 88/58 , MAP 68, HR 75 SR  Hb 10.7  Jayson 3.2  No Nipride challenge performed for PA pressures and elevated PVR given BP         Assessment and Plan:  Chronic combined systolic and diastolic heart failure    Essential hypertension    Hypertensive cardiovascular-renal disease, stage 1-4 or unspecified chronic kidney disease, with heart failure    ICD (implantable cardioverter-defibrillator) in place    Type 2 diabetes mellitus with stage 4 chronic kidney disease, without long-term current use of insulin    Type 2 diabetes mellitus without complication, without long-term current use of insulin          Acute on chronic systolic HF, NYHA class III, stage D. In home dobutamine 5 mcg/kg/min.  Etiology: NICM.  Devices: ICD.  Hemodynamic status: warm, normotensive, severely hypervolemic.  Clinical course: multiple HF hospitalizations.  Plan:  -admit to hospital for decongestion.

## 2024-11-19 ENCOUNTER — DOCUMENTATION ONLY (OUTPATIENT)
Dept: CARDIOLOGY | Facility: CLINIC | Age: 59
End: 2024-11-19
Payer: MEDICAID

## 2024-11-19 PROBLEM — F41.9 ANXIETY: Status: ACTIVE | Noted: 2024-11-19

## 2024-11-19 PROBLEM — Z71.89 GOALS OF CARE, COUNSELING/DISCUSSION: Status: ACTIVE | Noted: 2024-11-19

## 2024-11-19 PROBLEM — Z71.89 ACP (ADVANCE CARE PLANNING): Status: ACTIVE | Noted: 2024-11-19

## 2024-11-19 PROBLEM — I50.43 ACUTE ON CHRONIC COMBINED SYSTOLIC (CONGESTIVE) AND DIASTOLIC (CONGESTIVE) HEART FAILURE: Status: ACTIVE | Noted: 2024-11-19

## 2024-11-19 LAB
ANION GAP SERPL CALC-SCNC: 11 MMOL/L (ref 8–16)
ASCENDING AORTA: 3.77 CM
AV AREA BY CONTINUOUS VTI: 2.1 CM2
AV INDEX (PROSTH): 0.7
AV LVOT MEAN GRADIENT: 1 MMHG
AV LVOT PEAK GRADIENT: 2 MMHG
AV MEAN GRADIENT: 2.3 MMHG
AV PEAK GRADIENT: 4.8 MMHG
AV VALVE AREA BY VELOCITY RATIO: 2 CM²
AV VALVE AREA: 2.2 CM2
AV VELOCITY RATIO: 0.64
BASOPHILS # BLD AUTO: 0.03 K/UL (ref 0–0.2)
BASOPHILS NFR BLD: 0.8 % (ref 0–1.9)
BSA FOR ECHO PROCEDURE: 2.01 M2
BUN SERPL-MCNC: 90 MG/DL (ref 6–20)
CALCIUM SERPL-MCNC: 9 MG/DL (ref 8.7–10.5)
CHLORIDE SERPL-SCNC: 109 MMOL/L (ref 95–110)
CO2 SERPL-SCNC: 21 MMOL/L (ref 23–29)
CREAT SERPL-MCNC: 2.8 MG/DL (ref 0.5–1.4)
CV ECHO LV RWT: 0.36 CM
DIFFERENTIAL METHOD BLD: ABNORMAL
DOP CALC AO PEAK VEL: 1.1 M/S
DOP CALC AO VTI: 18.2 CM
DOP CALC LVOT AREA: 3.1 CM2
DOP CALC LVOT DIAMETER: 2 CM
DOP CALC LVOT PEAK VEL: 0.7 M/S
DOP CALC LVOT STROKE VOLUME: 39.9 CM3
DOP CALCLVOT PEAK VEL VTI: 12.7 CM
E WAVE DECELERATION TIME: 185.89 MS
E/A RATIO: 1.78
E/E' RATIO: 7.38 M/S
ECHO EF ESTIMATED: 21 %
ECHO LV POSTERIOR WALL: 1.2 CM (ref 0.6–1.1)
EOSINOPHIL # BLD AUTO: 0 K/UL (ref 0–0.5)
EOSINOPHIL NFR BLD: 1.1 % (ref 0–8)
ERYTHROCYTE [DISTWIDTH] IN BLOOD BY AUTOMATED COUNT: 36 % (ref 11.5–14.5)
EST. GFR  (NO RACE VARIABLE): 18.9 ML/MIN/1.73 M^2
ESTIMATED AVG GLUCOSE: 103 MG/DL (ref 68–131)
FRACTIONAL SHORTENING: 9.1 % (ref 28–44)
GLUCOSE SERPL-MCNC: 47 MG/DL (ref 70–110)
HBA1C MFR BLD: 5.2 % (ref 4–5.6)
HCT VFR BLD AUTO: 32.4 % (ref 37–48.5)
HGB BLD-MCNC: 9.4 G/DL (ref 12–16)
IMM GRANULOCYTES # BLD AUTO: 0.02 K/UL (ref 0–0.04)
IMM GRANULOCYTES NFR BLD AUTO: 0.5 % (ref 0–0.5)
INTERVENTRICULAR SEPTUM: 1.1 CM (ref 0.6–1.1)
IVC DIAMETER: 1.99 CM
LA MAJOR: 7.04 CM
LA MINOR: 7.01 CM
LA WIDTH: 4.9 CM
LACTATE SERPL-SCNC: 0.9 MMOL/L (ref 0.5–2.2)
LEFT ATRIUM SIZE: 5.5 CM
LEFT ATRIUM VOLUME INDEX MOD: 75.4 ML/M2
LEFT ATRIUM VOLUME INDEX: 81.7 ML/M2
LEFT ATRIUM VOLUME MOD: 148.46 ML
LEFT ATRIUM VOLUME: 160.92 CM3
LEFT INTERNAL DIMENSION IN SYSTOLE: 6 CM (ref 2.1–4)
LEFT VENTRICLE DIASTOLIC VOLUME INDEX: 115.24 ML/M2
LEFT VENTRICLE DIASTOLIC VOLUME: 227.02 ML
LEFT VENTRICLE MASS INDEX: 176.6 G/M2
LEFT VENTRICLE SYSTOLIC VOLUME INDEX: 91.2 ML/M2
LEFT VENTRICLE SYSTOLIC VOLUME: 179.58 ML
LEFT VENTRICULAR INTERNAL DIMENSION IN DIASTOLE: 6.6 CM (ref 3.5–6)
LEFT VENTRICULAR MASS: 347.9 G
LV LATERAL E/E' RATIO: 6
LV SEPTAL E/E' RATIO: 9.6
LYMPHOCYTES # BLD AUTO: 1 K/UL (ref 1–4.8)
LYMPHOCYTES NFR BLD: 28 % (ref 18–48)
MAGNESIUM SERPL-MCNC: 2.2 MG/DL (ref 1.6–2.6)
MCH RBC QN AUTO: 19.5 PG (ref 27–31)
MCHC RBC AUTO-ENTMCNC: 29 G/DL (ref 32–36)
MCV RBC AUTO: 67 FL (ref 82–98)
MONOCYTES # BLD AUTO: 0.6 K/UL (ref 0.3–1)
MONOCYTES NFR BLD: 16.3 % (ref 4–15)
MV PEAK A VEL: 0.27 M/S
MV PEAK E VEL: 0.48 M/S
NEUTROPHILS # BLD AUTO: 2 K/UL (ref 1.8–7.7)
NEUTROPHILS NFR BLD: 53.3 % (ref 38–73)
NRBC BLD-RTO: 20 /100 WBC
OHS CV RV/LV RATIO: 1 CM
PISA TR MAX VEL: 3.24 M/S
PLATELET # BLD AUTO: 140 K/UL (ref 150–450)
PMV BLD AUTO: ABNORMAL FL (ref 9.2–12.9)
POCT GLUCOSE: 54 MG/DL (ref 70–110)
POCT GLUCOSE: 84 MG/DL (ref 70–110)
POTASSIUM SERPL-SCNC: 4.8 MMOL/L (ref 3.5–5.1)
RA MAJOR: 6.92 CM
RA PRESSURE ESTIMATED: 15 MMHG
RA WIDTH: 4.52 CM
RBC # BLD AUTO: 4.83 M/UL (ref 4–5.4)
RIGHT ATRIAL AREA: 32.2 CM2
RIGHT VENTRICLE DIASTOLIC BASEL DIMENSION: 6.6 CM
RV TB RVSP: 18 MMHG
RV TISSUE DOPPLER FREE WALL SYSTOLIC VELOCITY 1 (APICAL 4 CHAMBER VIEW): 10.22 CM/S
SINUS: 3.35 CM
SODIUM SERPL-SCNC: 141 MMOL/L (ref 136–145)
STJ: 2.39 CM
TDI LATERAL: 0.08 M/S
TDI SEPTAL: 0.05 M/S
TDI: 0.07 M/S
TR MAX PG: 42 MMHG
TRICUSPID ANNULAR PLANE SYSTOLIC EXCURSION: 1.37 CM
TV PEAK GRADIENT: 42 MMHG
TV REST PULMONARY ARTERY PRESSURE: 57 MMHG
WBC # BLD AUTO: 3.68 K/UL (ref 3.9–12.7)
Z-SCORE OF LEFT VENTRICULAR DIMENSION IN END DIASTOLE: 1.53
Z-SCORE OF LEFT VENTRICULAR DIMENSION IN END SYSTOLE: 4.31

## 2024-11-19 PROCEDURE — 99900035 HC TECH TIME PER 15 MIN (STAT)

## 2024-11-19 PROCEDURE — 99497 ADVNCD CARE PLAN 30 MIN: CPT | Mod: 25,,,

## 2024-11-19 PROCEDURE — 83036 HEMOGLOBIN GLYCOSYLATED A1C: CPT | Performed by: STUDENT IN AN ORGANIZED HEALTH CARE EDUCATION/TRAINING PROGRAM

## 2024-11-19 PROCEDURE — 99222 1ST HOSP IP/OBS MODERATE 55: CPT | Mod: ,,, | Performed by: STUDENT IN AN ORGANIZED HEALTH CARE EDUCATION/TRAINING PROGRAM

## 2024-11-19 PROCEDURE — 25000242 PHARM REV CODE 250 ALT 637 W/ HCPCS: Performed by: STUDENT IN AN ORGANIZED HEALTH CARE EDUCATION/TRAINING PROGRAM

## 2024-11-19 PROCEDURE — 25000003 PHARM REV CODE 250: Performed by: STUDENT IN AN ORGANIZED HEALTH CARE EDUCATION/TRAINING PROGRAM

## 2024-11-19 PROCEDURE — 94799 UNLISTED PULMONARY SVC/PX: CPT

## 2024-11-19 PROCEDURE — 80048 BASIC METABOLIC PNL TOTAL CA: CPT | Performed by: STUDENT IN AN ORGANIZED HEALTH CARE EDUCATION/TRAINING PROGRAM

## 2024-11-19 PROCEDURE — 94640 AIRWAY INHALATION TREATMENT: CPT

## 2024-11-19 PROCEDURE — 20600001 HC STEP DOWN PRIVATE ROOM

## 2024-11-19 PROCEDURE — 85025 COMPLETE CBC W/AUTO DIFF WBC: CPT | Performed by: STUDENT IN AN ORGANIZED HEALTH CARE EDUCATION/TRAINING PROGRAM

## 2024-11-19 PROCEDURE — 51702 INSERT TEMP BLADDER CATH: CPT

## 2024-11-19 PROCEDURE — 27000221 HC OXYGEN, UP TO 24 HOURS

## 2024-11-19 PROCEDURE — 25500020 PHARM REV CODE 255: Performed by: STUDENT IN AN ORGANIZED HEALTH CARE EDUCATION/TRAINING PROGRAM

## 2024-11-19 PROCEDURE — 63600175 PHARM REV CODE 636 W HCPCS: Performed by: STUDENT IN AN ORGANIZED HEALTH CARE EDUCATION/TRAINING PROGRAM

## 2024-11-19 PROCEDURE — 25000003 PHARM REV CODE 250: Performed by: PHYSICIAN ASSISTANT

## 2024-11-19 PROCEDURE — 94761 N-INVAS EAR/PLS OXIMETRY MLT: CPT

## 2024-11-19 PROCEDURE — 87040 BLOOD CULTURE FOR BACTERIA: CPT | Performed by: PHYSICIAN ASSISTANT

## 2024-11-19 PROCEDURE — 99223 1ST HOSP IP/OBS HIGH 75: CPT | Mod: ,,,

## 2024-11-19 PROCEDURE — 83735 ASSAY OF MAGNESIUM: CPT | Performed by: STUDENT IN AN ORGANIZED HEALTH CARE EDUCATION/TRAINING PROGRAM

## 2024-11-19 RX ORDER — GUAIFENESIN 100 MG/5ML
200 SOLUTION ORAL EVERY 4 HOURS PRN
Status: DISCONTINUED | OUTPATIENT
Start: 2024-11-19 | End: 2024-11-25 | Stop reason: HOSPADM

## 2024-11-19 RX ORDER — FUROSEMIDE 10 MG/ML
80 INJECTION INTRAMUSCULAR; INTRAVENOUS ONCE
Status: COMPLETED | OUTPATIENT
Start: 2024-11-19 | End: 2024-11-19

## 2024-11-19 RX ORDER — MUPIROCIN 20 MG/G
OINTMENT TOPICAL 2 TIMES DAILY
Status: COMPLETED | OUTPATIENT
Start: 2024-11-19 | End: 2024-11-23

## 2024-11-19 RX ORDER — PREGABALIN 25 MG/1
50 CAPSULE ORAL NIGHTLY PRN
Status: DISCONTINUED | OUTPATIENT
Start: 2024-11-19 | End: 2024-11-25 | Stop reason: HOSPADM

## 2024-11-19 RX ADMIN — GUAIFENESIN 200 MG: 200 SOLUTION ORAL at 11:11

## 2024-11-19 RX ADMIN — ASPIRIN 81 MG: 81 TABLET, COATED ORAL at 10:11

## 2024-11-19 RX ADMIN — GUAIFENESIN 200 MG: 200 SOLUTION ORAL at 09:11

## 2024-11-19 RX ADMIN — IPRATROPIUM BROMIDE AND ALBUTEROL SULFATE 3 ML: 2.5; .5 SOLUTION RESPIRATORY (INHALATION) at 05:11

## 2024-11-19 RX ADMIN — ISOSORBIDE MONONITRATE 30 MG: 30 TABLET, EXTENDED RELEASE ORAL at 10:11

## 2024-11-19 RX ADMIN — FUROSEMIDE 10 MG/HR: 10 INJECTION, SOLUTION INTRAMUSCULAR; INTRAVENOUS at 10:11

## 2024-11-19 RX ADMIN — AMIODARONE HYDROCHLORIDE 200 MG: 200 TABLET ORAL at 10:11

## 2024-11-19 RX ADMIN — TIOTROPIUM BROMIDE INHALATION SPRAY 2 PUFF: 3.12 SPRAY, METERED RESPIRATORY (INHALATION) at 08:11

## 2024-11-19 RX ADMIN — FUROSEMIDE 80 MG: 10 INJECTION, SOLUTION INTRAVENOUS at 12:11

## 2024-11-19 RX ADMIN — MUPIROCIN: 20 OINTMENT TOPICAL at 10:11

## 2024-11-19 RX ADMIN — IPRATROPIUM BROMIDE AND ALBUTEROL SULFATE 3 ML: 2.5; .5 SOLUTION RESPIRATORY (INHALATION) at 01:11

## 2024-11-19 RX ADMIN — SEVELAMER CARBONATE 1600 MG: 800 TABLET, FILM COATED ORAL at 04:11

## 2024-11-19 RX ADMIN — ATORVASTATIN CALCIUM 40 MG: 20 TABLET, FILM COATED ORAL at 09:11

## 2024-11-19 RX ADMIN — FUROSEMIDE 80 MG: 10 INJECTION, SOLUTION INTRAVENOUS at 10:11

## 2024-11-19 RX ADMIN — EMPAGLIFLOZIN 25 MG: 25 TABLET, FILM COATED ORAL at 09:11

## 2024-11-19 RX ADMIN — SEVELAMER CARBONATE 1600 MG: 800 TABLET, FILM COATED ORAL at 10:11

## 2024-11-19 RX ADMIN — APIXABAN 2.5 MG: 2.5 TABLET, FILM COATED ORAL at 10:11

## 2024-11-19 RX ADMIN — MUPIROCIN: 20 OINTMENT TOPICAL at 09:11

## 2024-11-19 RX ADMIN — PERFLUTREN 1.5 ML: 6.52 INJECTION, SUSPENSION INTRAVENOUS at 09:11

## 2024-11-19 RX ADMIN — APIXABAN 5 MG: 5 TABLET, FILM COATED ORAL at 09:11

## 2024-11-19 RX ADMIN — SEVELAMER CARBONATE 1600 MG: 800 TABLET, FILM COATED ORAL at 01:11

## 2024-11-19 RX ADMIN — FERROUS SULFATE TAB EC 325 MG (65 MG FE EQUIVALENT) 1 EACH: 325 (65 FE) TABLET DELAYED RESPONSE at 10:11

## 2024-11-19 NOTE — H&P
Arie Vazquez - Cardiology [facility] Medicine  History & Physical    Patient Name: Hafsa Hawley  MRN: [id number]  Patient Class: IP- Inpatient  Admission Date: 11/18/2024  Attending Physician: Denny Elias DO   Primary Care Provider: Cristobal Ann MD         Patient information was obtained from patient and past medical records.     Subjective:     Principal Problem:Acute on chronic combined systolic and diastolic heart failure    Chief Complaint: No chief complaint on file.       HPI: Hafsa Hawley is a 59 y.o. female with severe combined CHF (EF 10-15%) with ICD in place, AFib and VTach, CKD 4, T2DM not on insulin, restrictive lung disease, MELISSA, anemia, chronic pain on opioids, chronic prescription benzodiazepine use who presents from heart failure clinic for CHF exacerbation.     She reports that over the past few days, she has had an increase in her chronic shortness of breath particularly with exertion. She also notes lower extremity edema, which she usually doesn't get unless she is in severe CHF exacerbation. Has associated cough with chest pressure, which increases with cough.     She ran out of her Bumex due to the pharmacy not having it approximately one week ago. She has tried taking her Metolazone instead, which she usually takes 2-3 times per week PRN, but has been taking daily but has had persistent dyspnea and swelling despite this.     She presented to Cardiology clinic today, who recommended she be admitted for diuresis.     Past Medical History:   Diagnosis Date    Allergy     Anemia     Arthritis     Atrial fibrillation     OAC    BMI 32.0-32.9,adult 02/22/2023    Chronic respiratory failure with hypoxia, on home oxygen therapy     2L with activity, off at rest.  Per Pulm  no overt evidence of ILD or COPD on PFTs and CT to explain O2 needs.    CKD (chronic kidney disease), stage IV 05/08/2018    Congestive heart failure     s/p AICD placement,    Deep vein thrombosis      Depression     elevated bilirubin d/t Gilbert's syndrome     confirmed by [facility] genetic testing, evaluated by hepatology    Encounter for blood transfusion     GERD (gastroesophageal reflux disease)     Hypertension     Pheochromocytoma, malignant     Right kidney mass     Sleep apnea     Thalassemia trait, alpha     Thyroid disease     Type 2 diabetes mellitus with hyperglycemia, without long-term current use of insulin 08/13/2020       Past Surgical History:   Procedure Laterality Date    ANGIOGRAM, ABDOMINAL AORTA Right 04/15/2024    APPENDECTOMY      BONE MARROW BIOPSY      CARDIAC DEFIBRILLATOR PLACEMENT Left 12/2016    CARDIAC ELECTROPHYSIOLOGY MAPPING AND ABLATION      CARDIAC ELECTROPHYSIOLOGY MAPPING AND ABLATION      COLONOSCOPY N/A 05/06/2022    Procedure: COLONOSCOPY;  Surgeon: Arely Betancourt MD;  Location: [facility] ENDO (2ND FLR);  Service: Endoscopy;  Laterality: N/A;  heart transplant candidate/ EF 25% - 2nd floor/ defib - Biotronik - ERW  Eliquis - per Dr. Cortez with [facility], Pt ok to hold Eliquis x 2 days prior-see media tab-outside correspondence dated 12/30/21  - ER[facility]  verbal instructions/portal instructions/email instructions - s    EYE SURGERY      due to running tears    FRACTURE SURGERY Left     hand 5th digit    HYSTERECTOMY      KNEE SURGERY Left 2016    hematoma    LIVER BIOPSY  10/24/2018    Minimal steatosis, predominantly macrovesicular, 1%, Minimal nonspecific portal inflammation, no fibrosis. No findings on biopsy to explain elevated bilirubin levels. Could be d/t Gilbert's =?- hemolysis    RIGHT HEART CATHETERIZATION Right 12/07/2021    Procedure: INSERTION, CATHETER, RIGHT HEART;  Surgeon: Irving Cardenas MD;  Location: [facility] CATH LAB;  Service: Cardiology;  Laterality: Right;    RIGHT HEART CATHETERIZATION Right 12/19/2022    Procedure: INSERTION, CATHETER, RIGHT HEART;  Surgeon: Burke Camilo MD;  Location: [facility] CATH LAB;  Service: Cardiology;  Laterality: Right;    RIGHT  "HEART CATHETERIZATION Right 03/29/2023    Procedure: INSERTION, CATHETER, RIGHT HEART;  Surgeon: Katie Liriano DO;  Location: [facility] CATH LAB;  Service: Cardiology;  Laterality: Right;    TRANSJUGULAR BIOPSY OF LIVER N/A 10/24/2018    Procedure: BIOPSY, LIVER, TRANSJUGULAR APPROACH;  Surgeon: Carmen Diagnostic Provider;  Location: [facility] OR [address] [place] [address];  Service: Radiology;  Laterality: N/A;       Review of patient's allergies indicates:   Allergen Reactions    Penicillins Hives and Other (See Comments)    Iodinated contrast media Nausea And Vomiting    Oxycodone-acetaminophen Other (See Comments)     Nausea, Dizziness, Anxiety.  "I don't like how it makes me feel."   Given Hydromorphone 0.5mg IVP  Without problems.  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 Metabolism of other drugs not listed here may also be impacted by UGT1A1 enzyme activity.       Tramadol Nausea And Vomiting and Other (See Comments)     Other reaction(s): Other (See Comments)    Valsartan Other (See Comments)       No current facility-administered medications on file prior to encounter.     Current Outpatient Medications on File Prior to Encounter   Medication Sig    acetaminophen (TYLENOL) 650 MG TbSR Take 1 tablet (650 mg total) by mouth every 8 (eight) hours. (Patient taking differently: Take 650 mg by mouth daily as needed (pain).)    albuterol-ipratropium (DUO-NEB) 2.5 mg-0.5 mg/3 mL nebulizer " solution Take 3 mLs by nebulization every 6 (six) hours as needed for Wheezing or Shortness of Breath.    ALPRAZolam (XANAX) 2 MG Tab Take 1 tablet (2 mg total) by mouth 2 (two) times daily as needed.    amiodarone (PACERONE) 200 MG Tab take one tablet by mouth daily    amiodarone (PACERONE) 200 MG Tab Take 1 tablet (200 mg total) by mouth every 12 (twelve) hours for 30 days. Take twice daily until 11/10, then transition to once daily unless otherwise instructed by your cardiologist.    apixaban (ELIQUIS) 2.5 mg Tab take one tablet by mouth twice a day    apixaban (ELIQUIS) 5 mg Tab Take 1 tablet (5 mg total) by mouth 2 (two) times a day for 30 days.    aspirin (ECOTRIN) 81 MG EC tablet Take 81 mg by mouth once daily.    atorvastatin (LIPITOR) 40 MG tablet take one tablet by mouth in the evening    bumetanide (BUMEX) 2 MG tablet Take 2 tablets (4 mg total) by mouth 2 (two) times daily.    bumetanide (BUMEX) 2 MG tablet Take 1 tablet (2 mg total) by mouth 2 (two) times a day for 30 days.    cetirizine (ZYRTEC) 10 MG tablet Take 1 tablet (10 mg total) by mouth once daily.    cyanocobalamin (VITAMIN B-12) 1000 MCG tablet Take 1,000 mcg by mouth once daily.    DOBUTamine (DOBUTREX) 1,000 mg/250 mL (4,000 mcg/mL) infusion Inject 409 mcg/min into the vein continuous.    empagliflozin (JARDIANCE) 25 mg tablet take one tablet by mouth daily    ergocalciferol (VITAMIN D2) 50,000 unit Cap Take 1 capsule orally once a  week (Patient taking differently: Take 50,000 Units by mouth every 7 days. Thursdays)    ferrous sulfate 325 (65 FE) MG EC tablet Take 325 mg by mouth once daily.    fluticasone propionate (FLONASE ALLERGY RELIEF) 50 mcg/actuation nasal spray Use 2 sprays (100 mcg total) by Each Nostril route once daily.    glimepiride (AMARYL) 1 MG tablet Take 1 tablet (1 mg total) by mouth before breakfast.    HYDROcodone-acetaminophen (NORCO)  mg per tablet Take 1 tablet by mouth every 6 (six) hours as needed for Pain.     hydrOXYzine HCL (ATARAX) 25 MG tablet take one tablet orally four times a day as needed for itching    isosorbide-hydrALAZINE 20-37.5 mg (BIDIL) 20-37.5 mg Tab Take 1 tablet by mouth 3 (three) times daily. (Patient taking differently: Take 1 tablet by mouth 2 (two) times daily.)    isosorbide-hydrALAZINE 20-37.5 mg (BIDIL) 20-37.5 mg Tab take 1 tablet orally twice a day    LIDOcaine (LIDODERM) 5 % Place 1 patch onto the skin once daily. Remove & discard patch within 12 hours or as directed by MD.    metOLazone (ZAROXOLYN) 5 MG tablet Take 1 tablet (5 mg total) by mouth daily as needed (Excess fluid).    metoprolol succinate (TOPROL-XL) 25 MG 24 hr tablet Take 0.5 tablets (12.5 mg total) by mouth once daily.    neomycin-polymyxin-dexamethasone (DEXACINE) 3.5 mg/g-10,000 unit/g-0.1 % Oint Place 1-2 drops into each eye three times daily for 7-10 days    nitroGLYCERIN (NITROSTAT) 0.4 MG SL tablet Put 1 tab under the tongue every 5 minutes x 3 doses as needed for chest pain. Do not exceed 3 doses in 15 minutes. If no relief, go to ER.    ondansetron (ZOFRAN-ODT) 8 MG TbDL Dissolve 1 tablet (8 mg total) by mouth every 8 (eight) hours as needed (nausea).    pantoprazole (PROTONIX) 40 MG tablet Take one tablet by mouth daily    sevelamer carbonate (RENVELA) 800 mg Tab take FOUR tablets orally three times a day with meals    SPIRIVA WITH HANDIHALER 18 mcg inhalation capsule Inhale 1 capsule (18 mcg total) into the lungs once daily.    torsemide (DEMADEX) 100 MG Tab take one tablet by mouth daily in the morning    VENTOLIN HFA 90 mcg/actuation inhaler Inhale 2 puffs into the lungs every 6 (six) hours as needed for Shortness of Breath or Wheezing. (Patient taking differently: Inhale 2 puffs into the lungs daily as needed for Shortness of Breath or Wheezing.)    [DISCONTINUED] blood-glucose sensor (DEXCOM G7 SENSOR) Evon change sensor every 10 days.    [DISCONTINUED] magnesium oxide (MAG-OX) 400 mg (241.3 mg magnesium)  tablet take one tablet by mouth daily (Patient not taking: Reported on 11/5/2024)    [DISCONTINUED] metoprolol succinate (TOPROL-XL) 25 MG 24 hr tablet take one tablet by mouth daily    [DISCONTINUED] pantoprazole (PROTONIX) 40 MG tablet take one tablet by mouth daily    [DISCONTINUED] polyethylene glycol (MIRALAX) 17 gram/dose powder Take 17 g by mouth once daily. (Patient taking differently: Take 17 g by mouth daily as needed for Constipation.)    [DISCONTINUED] potassium chloride SA (K-DUR,KLOR-CON) 20 MEQ tablet Take 1 tablet (20 mEq total) by mouth 2 (two) times daily.    [DISCONTINUED] semaglutide (OZEMPIC) 1 mg/dose (4 mg/3 mL) Inject 1 mg into the skin every 7 days.    [DISCONTINUED] vancomycin (VANCOCIN) 125 MG capsule take one capsule by mouth four times a day for four days     Family History       Problem Relation (Age of Onset)    Cancer Mother    Cirrhosis Brother    Diabetes Brother    Heart attack Father    Heart disease Father, Sister, Brother, Sister, Brother    Hypertension Father, Brother          Tobacco Use    Smoking status: Never    Smokeless tobacco: Never   Substance and Sexual Activity    Alcohol use: Not Currently     Comment: up to 1 yr ago drank 2-3 drinks on occasion but sporadic    Drug use: No    Sexual activity: Yes     Partners: Male     Review of Systems   All other systems reviewed and are negative.    Objective:     Vital Signs (Most Recent):  Temp: 98.3 °F (36.8 °C) (11/18/24 1957)  Pulse: 86 (11/18/24 2000)  Resp: 18 (11/18/24 2100)  BP: 105/67 (11/18/24 1957)  SpO2: 99 % (11/18/24 1957) Vital Signs (24h Range):  Temp:  [98.3 °F (36.8 °C)] 98.3 °F (36.8 °C)  Pulse:  [85-89] 86  Resp:  [17-18] 18  SpO2:  [93 %-99 %] 99 %  BP: (105-119)/(65-73) 105/67     Weight: 87.2 kg (192 lb 3.9 oz)  Body mass index is 31.03 kg/m².     Physical Exam  Vitals and nursing note reviewed.   Constitutional:       General: She is not in acute distress.     Appearance: She is well-developed. She is  "ill-appearing (chronically). She is not diaphoretic.   HENT:      Head: Normocephalic and atraumatic.   Eyes:      General: No scleral icterus.     Conjunctiva/sclera: Conjunctivae normal.   Neck:      Vascular: No JVD.   Cardiovascular:      Rate and Rhythm: Normal rate. Rhythm irregular.      Heart sounds: Murmur heard.      Gallop present.   Pulmonary:      Effort: Pulmonary effort is normal. No respiratory distress.   Abdominal:      General: There is distension.      Tenderness: There is no abdominal tenderness.   Musculoskeletal:      Right lower leg: Edema present.      Left lower leg: Edema present.      Comments: Pitting edema to BL LEs   Skin:     Coloration: Skin is not jaundiced or pale.   Neurological:      Mental Status: She is alert and oriented to person, place, and time.      Motor: No abnormal muscle tone.   Psychiatric:         Mood and Affect: Mood normal.         Behavior: Behavior normal.                Significant Labs: All pertinent labs within the past 24 hours have been reviewed.  CBC: No results for input(s): "WBC", "HGB", "HCT", "PLT" in the last 48 hours.  CMP:   Recent Labs   Lab 11/18/24  1454      K 4.9      CO2 22*   *   BUN 82*   CREATININE 2.8*   CALCIUM 9.3   ANIONGAP 10       Significant Imaging: I have reviewed all pertinent imaging results/findings within the past 24 hours.  Assessment/Plan:     * Acute on chronic combined systolic and diastolic heart failure  Patient with known severe CHF with last EF 10-15% and Grade 2 DD on TTE 5/2024, on chronic dobutamine gtt. Followed by advanced HF clinic, and has been considered for transplant however declined by 2 facilities. Presents with signs of volume overload on exam and worsening dyspnes, consistent with CHF exacerbation. Placed on CHF pathway.  -Will continue diuresis with Bumex IV while monitoring UOP and for improvement in symptoms. Will continue home dobutamine as well. Continue GDMT as tolerated, although " limited chronically due to advanced CKD and softer BPs.  -Consult Cardiology for assistance with management while inpatient.   -Will obtain daily weights and monitor renal function and electrolytes on serial labs. Strict intake and output along with fluid restricted diet. Monitor on telemetry.     Chronic pain with opioid use  Continue home regimen as verified by PDMP.      Type 2 diabetes mellitus without complication, without long-term current use of insulin  Recent A1c 5.7, well-controlled likely due to advancing CKD. Continue home Jardiance for CHF. No indication for insulin at this time, as to avoid hypoglycemia in advanced CKD.      Hyperlipidemia associated with type 2 diabetes mellitus  Continue hospital formulary of home statin.       Anemia of chronic disease  Stable, and around baseline without indication for transfusion. Monitor.     Paroxysmal A-fib  Patient has persistent (7 days or more) atrial fibrillation. [id number] Score: 3. The patients heart rate in the last 24 hours is as follows:  Pulse  Min: 85  Max: 89     Antiarrhythmics  amiodarone tablet 200 mg, Daily, Oral  metoprolol succinate (TOPROL-XL) 24 hr split tablet 12.5 mg, Daily, Oral    Anticoagulants  apixaban tablet 2.5 mg, 2 times daily, Oral    Plan  - Replete lytes with a goal of K>4, Mg >2  - Patient is anticoagulated, see medications listed above.  - Patient's afib is currently controlled  - Continue home meds.     Pulmonary HTN  Continue diuresis with goal of euvolemia.      CKD (chronic kidney disease), stage IV  Renal function remains around baseline; Estimated Creatinine Clearance: 24.1 mL/min (A) (based on SCr of 2.8 mg/dL (H)). according to latest data. Based on current GFR, CKD stage is stage 4 - GFR 15-29. Will avoid nephrotoxic agents as able, and renally dose all meds as applicable.    Chronic prescription benzodiazepine use  Verified by PDMP. Continue reduced dose of home regimen as to not precipitate withdrawal, however  cautiously as this is likely not an optimal medication for this patient with multiple advanced comorbidities and chronic respiratory failure.       Chronic respiratory failure with hypoxia  Reports using home oxygen, likely with chronic intermittent respiratory failure due to restrictive lung disease on PFTs and advanced CHF. Wean oxygen to maintain sats 90% or greater.     Paroxysmal supraventricular tachycardia  Continue Amiodarone (started during recent hospitalization in [place]). Monitor on telemetry.       MELISSA (obstructive sleep apnea)  Non-compliant with CPAP. Monitor.       Essential hypertension  Chronic, and currently controlled. Latest blood pressure and vitals reviewed-   While in the hospital, will manage blood pressure as follows; Continue home antihypertensive regimen along with diuresis.    Will utilize p.r.n. blood pressure medication only if patient's blood pressure greater than 180/110 and she develops symptoms such as worsening chest pain or shortness of breath.      VTE Risk Mitigation (From admission, onward)           Ordered     apixaban tablet 2.5 mg  2 times daily         11/18/24 1904     Reason for No Pharmacological VTE Prophylaxis  Once        Question:  Reasons:  Answer:  Already adequately anticoagulated on oral Anticoagulants    11/18/24 1904     IP VTE HIGH RISK PATIENT  Once         11/18/24 1904     Place sequential compression device  Until discontinued         11/18/24 1904                     Advance Care Planning   I spent less than 16 minutes discussing advance care planning.   Patient is FULL CODE on discussion with her.  Patient's surrogate decision maker is her .                  Gregorio Whitten MD  Department of Hospital Medicine  [facility] - Cardiology Stepdown

## 2024-11-19 NOTE — SUBJECTIVE & OBJECTIVE
Interval History: NAEON. Improved diuresis yesterday w/ 2.7L UOP, net -1.64L, & down 0.3kg in last 24hrs. Barton removed yesterday evening per pt request d/t discomfort. Pt reports poor sleep overnight d/t issues w/ purewick. No urinary issues though since removal of Barton- denies dysuria, hematuria, etc. Given pt asymptomatic, will hold off on abx pending urine cx. Holding Jardiance per cardiology recs. Otherwise remains HDS & relatively asymptomatic- no palpitations, chest pain or SOB. Plan to continue current cardiac package/diuretic regimen for now. Cardiology & Palliative Medicine following.     Review of Systems  Objective:     Vital Signs (Most Recent):  Temp: 97.5 °F (36.4 °C) (11/19/24 1136)  Pulse: 84 (11/19/24 1136)  Resp: 19 (11/19/24 1136)  BP: 114/62 (11/19/24 1136)  SpO2: 98 % (11/19/24 1136) Vital Signs (24h Range):  Temp:  [97.4 °F (36.3 °C)-98.3 °F (36.8 °C)] 97.5 °F (36.4 °C)  Pulse:  [77-89] 84  Resp:  [16-19] 19  SpO2:  [93 %-99 %] 98 %  BP: ()/(57-79) 114/62     Weight: 87.2 kg (192 lb 3.9 oz)  Body mass index is 31.03 kg/m².    Intake/Output Summary (Last 24 hours) at 11/19/2024 1218  Last data filed at 11/19/2024 0944  Gross per 24 hour   Intake 240 ml   Output 200 ml   Net 40 ml         Physical Exam  Constitutional:       General: She is not in acute distress.     Appearance: She is well-developed. She is ill-appearing (chronically). She is not toxic-appearing or diaphoretic.   HENT:      Head: Normocephalic and atraumatic.      Mouth/Throat:      Mouth: Mucous membranes are moist.   Eyes:      Conjunctiva/sclera: Conjunctivae normal.   Cardiovascular:      Rate and Rhythm: Normal rate. Rhythm irregular.      Heart sounds: Murmur heard.   Pulmonary:      Effort: Pulmonary effort is normal. No respiratory distress.      Breath sounds: Rales (scattered crackles) present.   Abdominal:      General: Bowel sounds are normal.      Palpations: Abdomen is soft.      Tenderness: There is no  abdominal tenderness. There is no guarding.   Musculoskeletal:      Right lower le+ Pitting Edema present.      Left lower le+ Pitting Edema present.   Skin:     Coloration: Skin is not jaundiced or pale.   Neurological:      General: No focal deficit present.      Mental Status: She is alert and oriented to person, place, and time.   Psychiatric:         Mood and Affect: Mood normal.         Behavior: Behavior normal.             Significant Labs: All pertinent labs within the past 24 hours have been reviewed.    Significant Imaging: I have reviewed all pertinent imaging results/findings within the past 24 hours.

## 2024-11-19 NOTE — ASSESSMENT & PLAN NOTE
Reports using home oxygen, likely with chronic intermittent respiratory failure due to restrictive lung disease on PFTs and advanced CHF. Wean oxygen to maintain sats 90% or greater.

## 2024-11-19 NOTE — ASSESSMENT & PLAN NOTE
Acute on chronic systolic HF, NYHA class III, stage D. On home dobutamine 5 mcg/kg/min. Etiology: NICM.  Hemodynamic status: warm, normotensive, severely hypervolemic.  Plan:  Patient not in cardiogenic shock given above profile and negative lactate  -Agree with IV bumex  overnight but if poor urine output patient might need a lasix drip/ metolazone   -- Monitor electrolytes closely. Maintain Mg >2 and K >4  - HF Pathway: Sodium restriction <2 g, Fluid restriction < 1500 mL, Strict I/Os, Daily weights

## 2024-11-19 NOTE — SUBJECTIVE & OBJECTIVE
Past Medical History:   Diagnosis Date    Allergy     Anemia     Arthritis     Atrial fibrillation     OAC    BMI 32.0-32.9,adult 02/22/2023    Chronic respiratory failure with hypoxia, on home oxygen therapy     2L with activity, off at rest.  Per Pulm  no overt evidence of ILD or COPD on PFTs and CT to explain O2 needs.    CKD (chronic kidney disease), stage IV 05/08/2018    Congestive heart failure     s/p AICD placement,    Deep vein thrombosis     Depression     elevated bilirubin d/t Gilbert's syndrome     confirmed by [facility] genetic testing, evaluated by hepatology    Encounter for blood transfusion     GERD (gastroesophageal reflux disease)     Hypertension     Pheochromocytoma, malignant     Right kidney mass     Sleep apnea     Thalassemia trait, alpha     Thyroid disease     Type 2 diabetes mellitus with hyperglycemia, without long-term current use of insulin 08/13/2020       Past Surgical History:   Procedure Laterality Date    ANGIOGRAM, ABDOMINAL AORTA Right 04/15/2024    APPENDECTOMY      BONE MARROW BIOPSY      CARDIAC DEFIBRILLATOR PLACEMENT Left 12/2016    CARDIAC ELECTROPHYSIOLOGY MAPPING AND ABLATION      CARDIAC ELECTROPHYSIOLOGY MAPPING AND ABLATION      COLONOSCOPY N/A 05/06/2022    Procedure: COLONOSCOPY;  Surgeon: Arely Betancourt MD;  Location: [facility] ([address] [place] [address]);  Service: Endoscopy;  Laterality: N/A;  heart transplant candidate/ EF 25% - 2nd floor/ defib - Biotronik - [facility]  Eliquis - per Dr. Cortez with [facility], Pt ok to hold Eliquis x 2 days prior-see media tab-outside correspondence dated 12/30/21  - [facility]  verbal instructions/portal instructions/email instructions - s    EYE SURGERY      due to running tears    FRACTURE SURGERY Left     hand 5th digit    HYSTERECTOMY      KNEE SURGERY Left 2016    hematoma    LIVER BIOPSY  10/24/2018    Minimal steatosis, predominantly macrovesicular, 1%, Minimal nonspecific portal inflammation, no fibrosis. No findings on biopsy to explain elevated  "bilirubin levels. Could be d/t Gilbert's =?- hemolysis    RIGHT HEART CATHETERIZATION Right 12/07/2021    Procedure: INSERTION, CATHETER, RIGHT HEART;  Surgeon: Irving Cardenas MD;  Location: [facility] CATH LAB;  Service: Cardiology;  Laterality: Right;    RIGHT HEART CATHETERIZATION Right 12/19/2022    Procedure: INSERTION, CATHETER, RIGHT HEART;  Surgeon: Burke Camilo MD;  Location: [facility] CATH LAB;  Service: Cardiology;  Laterality: Right;    RIGHT HEART CATHETERIZATION Right 03/29/2023    Procedure: INSERTION, CATHETER, RIGHT HEART;  Surgeon: Katie Liriano DO;  Location: [facility] CATH LAB;  Service: Cardiology;  Laterality: Right;    TRANSJUGULAR BIOPSY OF LIVER N/A 10/24/2018    Procedure: BIOPSY, LIVER, TRANSJUGULAR APPROACH;  Surgeon: Carmen Diagnostic Provider;  Location: [facility] OR [address] [place] [address];  Service: Radiology;  Laterality: N/A;       Review of patient's allergies indicates:   Allergen Reactions    Penicillins Hives and Other (See Comments)    Iodinated contrast media Nausea And Vomiting    Oxycodone-acetaminophen Other (See Comments)     Nausea, Dizziness, Anxiety.  "I don't like how it makes me feel."   Given Hydromorphone 0.5mg IVP  Without problems.  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 Metabolism of other drugs not listed here may also be impacted by UGT1A1 enzyme activity.       Tramadol Nausea And Vomiting and Other (See Comments)     Other " reaction(s): Other (See Comments)    Valsartan Other (See Comments)       No current facility-administered medications on file prior to encounter.     Current Outpatient Medications on File Prior to Encounter   Medication Sig    acetaminophen (TYLENOL) 650 MG TbSR Take 1 tablet (650 mg total) by mouth every 8 (eight) hours. (Patient taking differently: Take 650 mg by mouth daily as needed (pain).)    albuterol-ipratropium (DUO-NEB) 2.5 mg-0.5 mg/3 mL nebulizer solution Take 3 mLs by nebulization every 6 (six) hours as needed for Wheezing or Shortness of Breath.    ALPRAZolam (XANAX) 2 MG Tab Take 1 tablet (2 mg total) by mouth 2 (two) times daily as needed.    amiodarone (PACERONE) 200 MG Tab take one tablet by mouth daily    amiodarone (PACERONE) 200 MG Tab Take 1 tablet (200 mg total) by mouth every 12 (twelve) hours for 30 days. Take twice daily until 11/10, then transition to once daily unless otherwise instructed by your cardiologist.    apixaban (ELIQUIS) 2.5 mg Tab take one tablet by mouth twice a day    apixaban (ELIQUIS) 5 mg Tab Take 1 tablet (5 mg total) by mouth 2 (two) times a day for 30 days.    aspirin (ECOTRIN) 81 MG EC tablet Take 81 mg by mouth once daily.    atorvastatin (LIPITOR) 40 MG tablet take one tablet by mouth in the evening    bumetanide (BUMEX) 2 MG tablet Take 2 tablets (4 mg total) by mouth 2 (two) times daily.    bumetanide (BUMEX) 2 MG tablet Take 1 tablet (2 mg total) by mouth 2 (two) times a day for 30 days.    cetirizine (ZYRTEC) 10 MG tablet Take 1 tablet (10 mg total) by mouth once daily.    cyanocobalamin (VITAMIN B-12) 1000 MCG tablet Take 1,000 mcg by mouth once daily.    DOBUTamine (DOBUTREX) 1,000 mg/250 mL (4,000 mcg/mL) infusion Inject 409 mcg/min into the vein continuous.    empagliflozin (JARDIANCE) 25 mg tablet take one tablet by mouth daily    ergocalciferol (VITAMIN D2) 50,000 unit Cap Take 1 capsule orally once a  week (Patient taking differently: Take 50,000 Units  by mouth every 7 days. Thursdays)    ferrous sulfate 325 (65 FE) MG EC tablet Take 325 mg by mouth once daily.    fluticasone propionate (FLONASE ALLERGY RELIEF) 50 mcg/actuation nasal spray Use 2 sprays (100 mcg total) by Each Nostril route once daily.    glimepiride (AMARYL) 1 MG tablet Take 1 tablet (1 mg total) by mouth before breakfast.    HYDROcodone-acetaminophen (NORCO)  mg per tablet Take 1 tablet by mouth every 6 (six) hours as needed for Pain.    hydrOXYzine HCL (ATARAX) 25 MG tablet take one tablet orally four times a day as needed for itching    isosorbide-hydrALAZINE 20-37.5 mg (BIDIL) 20-37.5 mg Tab Take 1 tablet by mouth 3 (three) times daily. (Patient taking differently: Take 1 tablet by mouth 2 (two) times daily.)    isosorbide-hydrALAZINE 20-37.5 mg (BIDIL) 20-37.5 mg Tab take 1 tablet orally twice a day    LIDOcaine (LIDODERM) 5 % Place 1 patch onto the skin once daily. Remove & discard patch within 12 hours or as directed by MD.    metOLazone (ZAROXOLYN) 5 MG tablet Take 1 tablet (5 mg total) by mouth daily as needed (Excess fluid).    metoprolol succinate (TOPROL-XL) 25 MG 24 hr tablet Take 0.5 tablets (12.5 mg total) by mouth once daily.    neomycin-polymyxin-dexamethasone (DEXACINE) 3.5 mg/g-10,000 unit/g-0.1 % Oint Place 1-2 drops into each eye three times daily for 7-10 days    nitroGLYCERIN (NITROSTAT) 0.4 MG SL tablet Put 1 tab under the tongue every 5 minutes x 3 doses as needed for chest pain. Do not exceed 3 doses in 15 minutes. If no relief, go to ER.    ondansetron (ZOFRAN-ODT) 8 MG TbDL Dissolve 1 tablet (8 mg total) by mouth every 8 (eight) hours as needed (nausea).    pantoprazole (PROTONIX) 40 MG tablet Take one tablet by mouth daily    sevelamer carbonate (RENVELA) 800 mg Tab take FOUR tablets orally three times a day with meals    SPIRIVA WITH HANDIHALER 18 mcg inhalation capsule Inhale 1 capsule (18 mcg total) into the lungs once daily.    torsemide (DEMADEX) 100 MG Tab  take one tablet by mouth daily in the morning    VENTOLIN HFA 90 mcg/actuation inhaler Inhale 2 puffs into the lungs every 6 (six) hours as needed for Shortness of Breath or Wheezing. (Patient taking differently: Inhale 2 puffs into the lungs daily as needed for Shortness of Breath or Wheezing.)     Family History       Problem Relation (Age of Onset)    Cancer Mother    Cirrhosis Brother    Diabetes Brother    Heart attack Father    Heart disease Father, Sister, Brother, Sister, Brother    Hypertension Father, Brother          Tobacco Use    Smoking status: Never    Smokeless tobacco: Never   Substance and Sexual Activity    Alcohol use: Not Currently     Comment: up to 1 yr ago drank 2-3 drinks on occasion but sporadic    Drug use: No    Sexual activity: Yes     Partners: Male     Review of Systems   Cardiovascular:  Positive for dyspnea on exertion, leg swelling, orthopnea and paroxysmal nocturnal dyspnea. Negative for chest pain.   Respiratory:  Positive for cough and shortness of breath.    All other systems reviewed and are negative.    Objective:     Vital Signs (Most Recent):  Temp: 97.8 °F (36.6 °C) (11/19/24 0506)  Pulse: 84 (11/19/24 0506)  Resp: 18 (11/19/24 0506)  BP: 118/79 (11/19/24 0506)  SpO2: 98 % (11/19/24 0506) Vital Signs (24h Range):  Temp:  [97.8 °F (36.6 °C)-98.3 °F (36.8 °C)] 97.8 °F (36.6 °C)  Pulse:  [81-89] 84  Resp:  [17-18] 18  SpO2:  [93 %-99 %] 98 %  BP: (105-119)/(65-79) 118/79     Weight: 87.2 kg (192 lb 3.9 oz)  Body mass index is 31.03 kg/m².    SpO2: 98 %         Intake/Output Summary (Last 24 hours) at 11/19/2024 0647  Last data filed at 11/19/2024 0007  Gross per 24 hour   Intake --   Output 100 ml   Net -100 ml       Lines/Drains/Airways       Peripherally Inserted Central Catheter Line  Duration             PICC Double Lumen 05/12/23 1534 right brachial 556 days                     Physical Exam  Constitutional:       Appearance: Normal appearance. She is obese.   Eyes:       Pupils: Pupils are equal, round, and reactive to light.   Neck:      Comments: JVD present   Cardiovascular:      Rate and Rhythm: Normal rate and regular rhythm.      Pulses: Normal pulses.      Heart sounds: Murmur heard.   Pulmonary:      Effort: Pulmonary effort is normal.      Breath sounds: Rales present.   Abdominal:      General: Abdomen is flat.      Palpations: Abdomen is soft.   Musculoskeletal:      Cervical back: Normal range of motion.      Right lower leg: Edema present.      Left lower leg: Edema present.   Neurological:      General: No focal deficit present.      Mental Status: She is alert and oriented to person, place, and time.   Psychiatric:         Mood and Affect: Mood normal.         Behavior: Behavior normal.          Significant Labs: All pertinent lab results from the last 24 hours have been reviewed.

## 2024-11-19 NOTE — ASSESSMENT & PLAN NOTE
Patient has persistent (7 days or more) atrial fibrillation. [id number] Score: 3. The patients heart rate in the last 24 hours is as follows:  Pulse  Min: 85  Max: 89     Antiarrhythmics  amiodarone tablet 200 mg, Daily, Oral  metoprolol succinate (TOPROL-XL) 24 hr split tablet 12.5 mg, Daily, Oral    Anticoagulants  apixaban tablet 2.5 mg, 2 times daily, Oral    Plan  - Replete lytes with a goal of K>4, Mg >2  - Patient is anticoagulated, see medications listed above.  - Patient's afib is currently controlled  - Continue home meds.

## 2024-11-19 NOTE — CONSULTS
[facility] - [facility] Stepdown  Cardiology  Consult Note    Patient Name: Hafsa Hawley  MRN: [id number]  Admission Date: 11/18/2024  Hospital Length of Stay: 1 days  Code Status: Full Code   Attending Provider: Denny Elias DO   Consulting Provider: Dena Robbins MD  Primary Care Physician: Cristobal Ann MD  Principal Problem:Acute on chronic combined systolic and diastolic heart failure    Patient information was obtained from patient, past medical records, and ER records.     Inpatient consult to Cardiology  Consult performed by: Dena Monterroso MD  Consult ordered by: Gregorio Whitten MD        Subjective:         HPI:    Hafsa Hawley is a 59 y.o. female with severe combined CHF (EF 10-15%) with ICD in place, AFib and VTach, CKD 4, T2DM not on insulin, restrictive lung disease, MELISSA, anemia, chronic pain on opioids, chronic prescription benzodiazepine use who presents from heart failure clinic for CHF exacerbation. She reports that over the past few days, she has had an increase in her chronic shortness of breath particularly with exertion. She also notes lower extremity edema, which she usually doesn't get unless she is in severe CHF exacerbation. Has associated cough with chest pressure, which increases with cough.   She ran out of her Bumex due to the pharmacy not having it approximately one week ago. She has tried taking her Metolazone instead, which she usually takes 2-3 times per week PRN, but has been taking daily but has had persistent dyspnea and swelling despite this.   Patient on palliative dobutamine and not a candidate for advanced options.  Patient was seen by [facility] yesterday and found to be in heart failure exacerbations to so sent to for diuresis hospital        Past Medical History:   Diagnosis Date    Allergy     Anemia     Arthritis     Atrial fibrillation     OAC    BMI 32.0-32.9,adult 02/22/2023    Chronic respiratory failure with hypoxia, on  home oxygen therapy     2L with activity, off at rest.  Per Pulm  no overt evidence of ILD or COPD on PFTs and CT to explain O2 needs.    CKD (chronic kidney disease), stage IV 05/08/2018    Congestive heart failure     s/p AICD placement,    Deep vein thrombosis     Depression     elevated bilirubin d/t Gilbert's syndrome     confirmed by [facility] genetic testing, evaluated by hepatology    Encounter for blood transfusion     GERD (gastroesophageal reflux disease)     Hypertension     Pheochromocytoma, malignant     Right kidney mass     Sleep apnea     Thalassemia trait, alpha     Thyroid disease     Type 2 diabetes mellitus with hyperglycemia, without long-term current use of insulin 08/13/2020       Past Surgical History:   Procedure Laterality Date    ANGIOGRAM, ABDOMINAL AORTA Right 04/15/2024    APPENDECTOMY      BONE MARROW BIOPSY      CARDIAC DEFIBRILLATOR PLACEMENT Left 12/2016    CARDIAC ELECTROPHYSIOLOGY MAPPING AND ABLATION      CARDIAC ELECTROPHYSIOLOGY MAPPING AND ABLATION      COLONOSCOPY N/A 05/06/2022    Procedure: COLONOSCOPY;  Surgeon: Arely Betancourt MD;  Location: [facility] ([address] [place] [address]);  Service: Endoscopy;  Laterality: N/A;  heart transplant candidate/ EF 25% - 2nd floor/ defib - Biotronik - ERW  Eliquis - per Dr. Cortez with [facility], Pt ok to hold Eliquis x 2 days prior-see media tab-outside correspondence dated 12/30/21  - [facility]  verbal instructions/portal instructions/email instructions - s    EYE SURGERY      due to running tears    FRACTURE SURGERY Left     hand 5th digit    HYSTERECTOMY      KNEE SURGERY Left 2016    hematoma    LIVER BIOPSY  10/24/2018    Minimal steatosis, predominantly macrovesicular, 1%, Minimal nonspecific portal inflammation, no fibrosis. No findings on biopsy to explain elevated bilirubin levels. Could be d/t Gilbert's =?- hemolysis    RIGHT HEART CATHETERIZATION Right 12/07/2021    Procedure: INSERTION, CATHETER, RIGHT HEART;  Surgeon: Irving Cardenas MD;  Location:  "[facility] CATH LAB;  Service: Cardiology;  Laterality: Right;    RIGHT HEART CATHETERIZATION Right 12/19/2022    Procedure: INSERTION, CATHETER, RIGHT HEART;  Surgeon: Burke Camilo MD;  Location: [facility] CATH LAB;  Service: Cardiology;  Laterality: Right;    RIGHT HEART CATHETERIZATION Right 03/29/2023    Procedure: INSERTION, CATHETER, RIGHT HEART;  Surgeon: Katie Liriano DO;  Location: [facility] CATH LAB;  Service: Cardiology;  Laterality: Right;    TRANSJUGULAR BIOPSY OF LIVER N/A 10/24/2018    Procedure: BIOPSY, LIVER, TRANSJUGULAR APPROACH;  Surgeon: [facility] Diagnostic Provider;  Location: [facility] OR [address] [place] [address];  Service: Radiology;  Laterality: N/A;       Review of patient's allergies indicates:   Allergen Reactions    Penicillins Hives and Other (See Comments)    Iodinated contrast media Nausea And Vomiting    Oxycodone-acetaminophen Other (See Comments)     Nausea, Dizziness, Anxiety.  "I don't like how it makes me feel."   Given Hydromorphone 0.5mg IVP  Without problems.  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 Metabolism of other drugs not listed here may also be impacted by UGT1A1 enzyme activity.       Tramadol Nausea And Vomiting and Other (See Comments)     Other reaction(s): Other (See Comments)    Valsartan Other (See Comments)       No current facility-administered medications on file prior to encounter.     Current Outpatient Medications on File Prior to " Encounter   Medication Sig    acetaminophen (TYLENOL) 650 MG TbSR Take 1 tablet (650 mg total) by mouth every 8 (eight) hours. (Patient taking differently: Take 650 mg by mouth daily as needed (pain).)    albuterol-ipratropium (DUO-NEB) 2.5 mg-0.5 mg/3 mL nebulizer solution Take 3 mLs by nebulization every 6 (six) hours as needed for Wheezing or Shortness of Breath.    ALPRAZolam (XANAX) 2 MG Tab Take 1 tablet (2 mg total) by mouth 2 (two) times daily as needed.    amiodarone (PACERONE) 200 MG Tab take one tablet by mouth daily    amiodarone (PACERONE) 200 MG Tab Take 1 tablet (200 mg total) by mouth every 12 (twelve) hours for 30 days. Take twice daily until 11/10, then transition to once daily unless otherwise instructed by your cardiologist.    apixaban (ELIQUIS) 2.5 mg Tab take one tablet by mouth twice a day    apixaban (ELIQUIS) 5 mg Tab Take 1 tablet (5 mg total) by mouth 2 (two) times a day for 30 days.    aspirin (ECOTRIN) 81 MG EC tablet Take 81 mg by mouth once daily.    atorvastatin (LIPITOR) 40 MG tablet take one tablet by mouth in the evening    bumetanide (BUMEX) 2 MG tablet Take 2 tablets (4 mg total) by mouth 2 (two) times daily.    bumetanide (BUMEX) 2 MG tablet Take 1 tablet (2 mg total) by mouth 2 (two) times a day for 30 days.    cetirizine (ZYRTEC) 10 MG tablet Take 1 tablet (10 mg total) by mouth once daily.    cyanocobalamin (VITAMIN B-12) 1000 MCG tablet Take 1,000 mcg by mouth once daily.    DOBUTamine (DOBUTREX) 1,000 mg/250 mL (4,000 mcg/mL) infusion Inject 409 mcg/min into the vein continuous.    empagliflozin (JARDIANCE) 25 mg tablet take one tablet by mouth daily    ergocalciferol (VITAMIN D2) 50,000 unit Cap Take 1 capsule orally once a  week (Patient taking differently: Take 50,000 Units by mouth every 7 days. Thursdays)    ferrous sulfate 325 (65 FE) MG EC tablet Take 325 mg by mouth once daily.    fluticasone propionate (FLONASE ALLERGY RELIEF) 50 mcg/actuation nasal spray Use 2  sprays (100 mcg total) by Each Nostril route once daily.    glimepiride (AMARYL) 1 MG tablet Take 1 tablet (1 mg total) by mouth before breakfast.    HYDROcodone-acetaminophen (NORCO)  mg per tablet Take 1 tablet by mouth every 6 (six) hours as needed for Pain.    hydrOXYzine HCL (ATARAX) 25 MG tablet take one tablet orally four times a day as needed for itching    isosorbide-hydrALAZINE 20-37.5 mg (BIDIL) 20-37.5 mg Tab Take 1 tablet by mouth 3 (three) times daily. (Patient taking differently: Take 1 tablet by mouth 2 (two) times daily.)    isosorbide-hydrALAZINE 20-37.5 mg (BIDIL) 20-37.5 mg Tab take 1 tablet orally twice a day    LIDOcaine (LIDODERM) 5 % Place 1 patch onto the skin once daily. Remove & discard patch within 12 hours or as directed by MD.    metOLazone (ZAROXOLYN) 5 MG tablet Take 1 tablet (5 mg total) by mouth daily as needed (Excess fluid).    metoprolol succinate (TOPROL-XL) 25 MG 24 hr tablet Take 0.5 tablets (12.5 mg total) by mouth once daily.    neomycin-polymyxin-dexamethasone (DEXACINE) 3.5 mg/g-10,000 unit/g-0.1 % Oint Place 1-2 drops into each eye three times daily for 7-10 days    nitroGLYCERIN (NITROSTAT) 0.4 MG SL tablet Put 1 tab under the tongue every 5 minutes x 3 doses as needed for chest pain. Do not exceed 3 doses in 15 minutes. If no relief, go to ER.    ondansetron (ZOFRAN-ODT) 8 MG TbDL Dissolve 1 tablet (8 mg total) by mouth every 8 (eight) hours as needed (nausea).    pantoprazole (PROTONIX) 40 MG tablet Take one tablet by mouth daily    sevelamer carbonate (RENVELA) 800 mg Tab take FOUR tablets orally three times a day with meals    SPIRIVA WITH HANDIHALER 18 mcg inhalation capsule Inhale 1 capsule (18 mcg total) into the lungs once daily.    torsemide (DEMADEX) 100 MG Tab take one tablet by mouth daily in the morning    VENTOLIN HFA 90 mcg/actuation inhaler Inhale 2 puffs into the lungs every 6 (six) hours as needed for Shortness of Breath or Wheezing. (Patient  taking differently: Inhale 2 puffs into the lungs daily as needed for Shortness of Breath or Wheezing.)     Family History       Problem Relation (Age of Onset)    Cancer Mother    Cirrhosis Brother    Diabetes Brother    Heart attack Father    Heart disease Father, Sister, Brother, Sister, Brother    Hypertension Father, Brother          Tobacco Use    Smoking status: Never    Smokeless tobacco: Never   Substance and Sexual Activity    Alcohol use: Not Currently     Comment: up to 1 yr ago drank 2-3 drinks on occasion but sporadic    Drug use: No    Sexual activity: Yes     Partners: Male     Review of Systems   Cardiovascular:  Positive for dyspnea on exertion, leg swelling, orthopnea and paroxysmal nocturnal dyspnea. Negative for chest pain.   Respiratory:  Positive for cough and shortness of breath.    All other systems reviewed and are negative.    Objective:     Vital Signs (Most Recent):  Temp: 97.8 °F (36.6 °C) (11/19/24 0506)  Pulse: 84 (11/19/24 0506)  Resp: 18 (11/19/24 0506)  BP: 118/79 (11/19/24 0506)  SpO2: 98 % (11/19/24 0506) Vital Signs (24h Range):  Temp:  [97.8 °F (36.6 °C)-98.3 °F (36.8 °C)] 97.8 °F (36.6 °C)  Pulse:  [81-89] 84  Resp:  [17-18] 18  SpO2:  [93 %-99 %] 98 %  BP: (105-119)/(65-79) 118/79     Weight: 87.2 kg (192 lb 3.9 oz)  Body mass index is 31.03 kg/m².    SpO2: 98 %         Intake/Output Summary (Last 24 hours) at 11/19/2024 0647  Last data filed at 11/19/2024 0007  Gross per 24 hour   Intake --   Output 100 ml   Net -100 ml       Lines/Drains/Airways       Peripherally Inserted Central Catheter Line  Duration             PICC Double Lumen 05/12/23 1534 right brachial 556 days                     Physical Exam  Constitutional:       Appearance: Normal appearance. She is obese.   Eyes:      Pupils: Pupils are equal, round, and reactive to light.   Neck:      Comments: JVD present   Cardiovascular:      Rate and Rhythm: Normal rate and regular rhythm.      Pulses: Normal pulses.       Heart sounds: Murmur heard.   Pulmonary:      Effort: Pulmonary effort is normal.      Breath sounds: Rales present.   Abdominal:      General: Abdomen is flat.      Palpations: Abdomen is soft.   Musculoskeletal:      Cervical back: Normal range of motion.      Right lower leg: Edema present.      Left lower leg: Edema present.   Neurological:      General: No focal deficit present.      Mental Status: She is alert and oriented to person, place, and time.   Psychiatric:         Mood and Affect: Mood normal.         Behavior: Behavior normal.          Significant Labs: All pertinent lab results from the last 24 hours have been reviewed.      Assessment and Plan:     * Acute on chronic combined systolic and diastolic heart failure  Acute on chronic systolic HF, NYHA class III, stage D. On home dobutamine 5 mcg/kg/min. Etiology: NICM.  Hemodynamic status: warm, normotensive, severely hypervolemic.  Plan:  Patient not in cardiogenic shock given above profile and negative lactate  -Agree with IV bumex  overnight but if poor urine output patient might need a lasix drip/ metolazone   -- Monitor electrolytes closely. Maintain Mg >2 and K >4  - HF Pathway: Sodium restriction <2 g, Fluid restriction < 1500 mL, Strict I/Os, Daily weights         VTE Risk Mitigation (From admission, onward)           Ordered     apixaban tablet 2.5 mg  2 times daily         11/18/24 1904     Reason for No Pharmacological VTE Prophylaxis  Once        Question:  Reasons:  Answer:  Already adequately anticoagulated on oral Anticoagulants    11/18/24 1904     IP VTE HIGH RISK PATIENT  Once         11/18/24 1904     Place sequential compression device  Until discontinued         11/18/24 1904                    Thank you for your consult. I will follow-up with patient. Please contact us if you have any additional questions.    Dena Rbobins MD  Cardiology   [facility]

## 2024-11-19 NOTE — PROGRESS NOTES
Heart Failure Transitional Care Clinic (HFTCC) Team notified of pt referral via [facility] Failure [facility] (automated inbasket notification) .    Patient screened today 11/19/2024 by provider and LPN for enrollment to program.      Pt was deemed not a candidate for enrollment at this time related to patient is followed by [facility]-Advanced Heart Failure Section.HTS     Pt will require additional follow up planning per primary team.     If pt status, diagnosis, or treatment plan changes , please place AMB referral to Heart Failure Transitional Care Clinic ([id number]) for HFTCC enrollment re-evalution.

## 2024-11-19 NOTE — HOSPITAL COURSE
Referred from \[facility]\"" clinic due to concern for decompensated CHF. Not taking bumex x10 days. Cardiology consulted on arrival.  Started on IVP diuresis with minimal UOP. S/p lasix 80mg IVP x2, ultimately placed on lasix ggt. Given lack of candidacy for transplant, pt reportedly went to [place] for second opinion and was again denied for advanced options. Palliative care team following to discuss goals of care given poor prognosis and lack of options.  Additionally consulted Psychiatry given depression like symptoms while pt navigates through [facility] conversations. Lasix ggt increased to 30cc/hr with continuation of dobutamine ggt w/ much improved UOP.  Transitioned back to home Bumex 2 mg twice daily, tolerated. Additionally continuing on metolazone prn. Of note, in the interim dermatology was consulted for pruritic rash involving lower extremities.  Exam consistent with tinea pedis confirmed on KOH at bedside.  Started on terbinafine b.i.d. to affected areas and feet on discharge.  Stable for discharge with palliative and advanced CHF clinic f/u. Encouraged complaince with medications, refills sent to pharmacy.

## 2024-11-19 NOTE — PLAN OF CARE
Pt is Aox4, and ambulatory with assistance of cane.  Pt direct admit from HF clinic.  Pt placed on 2l NC   Safety precautions discussed  Bed locked in lowest position with call bell and personal items with in reach.  Pt denies any questions or concerns at this time.

## 2024-11-19 NOTE — PLAN OF CARE
Recommendations    Continue Diabetic diet, low Na, 1200 mL fluid.   Encourage good intakes  Nursing please document % intakes in chart.   Education provided on Low Na/Fluid restriction.   RD to monitor weights, intakes, labs    Goals:   Meet % of nutritional needs with diet  Maintain weight during admission.     Nutrition Goal Status: new  Communication of RD Recs: other (comment) (POC)

## 2024-11-19 NOTE — CARE UPDATE
I have reviewed the chart of Hafsa Hawley who is hospitalized for the following:    Active Hospital Problems    Diagnosis    *Acute on chronic combined systolic and diastolic heart failure    Chronic pain with opioid use    Type 2 diabetes mellitus without complication, without long-term current use of insulin    End stage heart failure     -HFrEF (EF 10-15%) on palliative   -Deemed not a candidate for OHTx/LVAD during committee meeting 3/2023 (poor medical adherence, declined pulmonary function, and declined renal function)  -Palliative care consulted      Hyperlipidemia associated with type 2 diabetes mellitus    Anemia of chronic disease    Paroxysmal A-fib    Pulmonary HTN    CKD (chronic kidney disease), stage IV    Chronic prescription benzodiazepine use     IMO Regualtory Update 4/1/23      Chronic respiratory failure with hypoxia    Paroxysmal supraventricular tachycardia    ICD (implantable cardioverter-defibrillator) in place     Biotronik ICD      NICM (nonischemic cardiomyopathy)    MELISSA (obstructive sleep apnea)    Essential hypertension        Yanely Garg PA-C  Unit Based [facility]

## 2024-11-19 NOTE — ASSESSMENT & PLAN NOTE
Renal function remains around baseline; Estimated Creatinine Clearance: 24.1 mL/min (A) (based on SCr of 2.8 mg/dL (H)). according to latest data. Based on current GFR, CKD stage is stage 4 - GFR 15-29. Will avoid nephrotoxic agents as able, and renally dose all meds as applicable.

## 2024-11-19 NOTE — ASSESSMENT & PLAN NOTE
-Patient with known severe CHF with last EF 10-15% and Grade 2 DD on TTE 5/2024, on chronic dobutamine gtt. Followed by advanced HF clinic, and has been considered for transplant however declined by 2 facilities. Presents with signs of volume overload on exam and worsening dyspnes, consistent with CHF exacerbation.   -TTE redemonstrating reduced EF although slighlty higher 15-20%. CVP 15mmHg  Plan:  -cardiology on board, appreciate assistance  -continue Lasix ggt, titrate as indicated  -Monitor UOP closely  - Will continue home dobutamine as well. Continue GDMT as tolerated, although limited chronically due to advanced CKD and softer BPs.  -Will obtain daily weights and monitor renal function and electrolytes on serial labs. Strict intake and output along with fluid restricted diet. Monitor on telemetry.

## 2024-11-19 NOTE — PLAN OF CARE
[facility] Cardiology Step[facility]  Initial Discharge Assessment       Primary Care Provider: Cristobal Ann MD    Admission Diagnosis: Acute on chronic combined systolic (congestive) and diastolic (congestive) heart failure [I50.43]    Admission Date: 11/18/2024  Expected Discharge Date: 11/21/2024    Transition of Care Barriers: None    Payor: MEDICAID / [facility]: [facility] / Product Type: Managed Medicaid /     Extended Emergency Contact Information  Primary Emergency Contact: jerrod hawley  Mobile Phone: [phone]  Relation: Significant other  Secondary Emergency Contact: Eriak Estrada  Mobile Phone: [phone]  Relation: Healthcare Power of     Discharge Plan A: Home with family, Other (home infusion)  Discharge Plan B: Hospice/home      [facility] [address] [place] [address]  Phone: [phone] Fax: [phone]    [facility]  [address] [place]  [facility] [address]  Phone: [phone] Fax: [phone]      Initial Assessment (most recent)       Adult Discharge Assessment - 11/19/24 1335          Discharge Assessment    Assessment Type Discharge Planning Assessment     Confirmed/corrected address, phone number and insurance Yes     Confirmed Demographics Correct on Facesheet     Source of Information patient;health record     Communicated CHIQUI with patient/caregiver Date not available/Unable to determine     Reason For Admission Acute CHF     People in Home significant other     Facility Arrived From: Home     Do you expect to return to your current living situation? Yes     Do you have help at home or someone to help you manage your care at home? Yes     Who are your caregiver(s) and their phone number(s)? Jerrod Hawley (spouse) [phone]     Prior to hospitilization cognitive status: Alert/Oriented     Current cognitive status: Alert/Oriented     Walking or Climbing Stairs Difficulty yes     Walking or Climbing Stairs  ambulation difficulty, requires equipment     Mobility Management cane     Dressing/Bathing Difficulty no     Equipment Currently Used at Home oxygen;cane, straight     Readmission within 30 days? No     Patient currently being followed by outpatient case management? No     Do you currently have service(s) that help you manage your care at home? Yes     Name and Contact number of agency [facility] for home dobutamine     Is the pt/caregiver preference to resume services with current agency Yes     Do you take prescription medications? Yes     Do you have prescription coverage? Yes     Do you have any problems affording any of your prescribed medications? No     Is the patient taking medications as prescribed? no     If no, which medications is patient not taking? Bumex     Who is going to help you get home at discharge? Jerrod Hawley (spouse) [phone]     How do you get to doctors appointments? car, drives self     Are you on dialysis? No     Do you take coumadin? No     Discharge Plan A Home with family;Other   home infusion    Discharge Plan B Hospice/home     DME Needed Upon Discharge  none     Discharge Plan discussed with: Patient     Transition of Care Barriers None                   SW met with pt at bedside to discuss discharge planning.  Pt lives with her  Jerrod and uses a cane for  ambulation and but is independent with ADLs.  Pt also uses home O2 and has home dobutamine with [facility].  No HH, dialysis, or coumadin.  PCP is Dr Ann.  Pt will have transportation and assistance from Jerrod at discharge.  Discharge Plan A and Plan B have been determined by review of patient's clinical status, future medical and therapeutic needs, and coverage/benefits for post-acute care in coordination with multidisciplinary team members.  TONYA name and ext on whiteboard; discharge planning booklet provided.  Will continue to follow.      Lourdes Elder LMSW  [facility]  [place]  [id number]

## 2024-11-19 NOTE — ASSESSMENT & PLAN NOTE
Chronic, and currently controlled. Latest blood pressure and vitals reviewed-   While in the hospital, will manage blood pressure as follows; Continue home antihypertensive regimen along with diuresis.    Will utilize p.r.n. blood pressure medication only if patient's blood pressure greater than 180/110 and she develops symptoms such as worsening chest pain or shortness of breath.

## 2024-11-19 NOTE — ASSESSMENT & PLAN NOTE
-Patient with known severe CHF with last EF 10-15% and Grade 2 DD on TTE 5/2024, on chronic dobutamine gtt. Followed by advanced HF clinic, and has been considered for transplant however declined by 2 facilities. Presents with signs of volume overload on exam and worsening dyspnes, consistent with CHF exacerbation.   Plan:  -Minimal UOP on bumex. Discussed with cardiology team. Lasix ggt at 20, IVP lasix 80mg x2 today  -Monitor UOP closely  - Will continue home dobutamine as well. Continue GDMT as tolerated, although limited chronically due to advanced CKD and softer BPs.  -Consult Cardiology for assistance with management while inpatient.   -Will obtain daily weights and monitor renal function and electrolytes on serial labs. Strict intake and output along with fluid restricted diet. Monitor on telemetry.

## 2024-11-19 NOTE — ASSESSMENT & PLAN NOTE
Continue Amiodarone (started during recent hospitalization in [place]). Monitor on telemetry.

## 2024-11-19 NOTE — HPI
Hafsa Hawley is a 59 y.o. female with severe combined CHF (EF 10-15%) with ICD in place, AFib and VTach, CKD 4, T2DM not on insulin, restrictive lung disease, MELISSA, anemia, chronic pain on opioids, chronic prescription benzodiazepine use who presents from heart failure clinic for CHF exacerbation.     She reports that over the past few days, she has had an increase in her chronic shortness of breath particularly with exertion. She also notes lower extremity edema, which she usually doesn't get unless she is in severe CHF exacerbation. Has associated cough with chest pressure, which increases with cough.     She ran out of her Bumex due to the pharmacy not having it approximately one week ago. She has tried taking her Metolazone instead, which she usually takes 2-3 times per week PRN, but has been taking daily but has had persistent dyspnea and swelling despite this.     She presented to Cardiology clinic today, who recommended she be admitted for diuresis.

## 2024-11-19 NOTE — HPI
Hafsa Hawley is a 59 y.o. female with severe combined CHF (EF 10-15%) with ICD in place, AFib and VTach, CKD 4, T2DM not on insulin, restrictive lung disease, MELISSA, anemia, chronic pain on opioids, chronic prescription benzodiazepine use who presents from heart failure clinic for CHF exacerbation. She reports that over the past few days, she has had an increase in her chronic shortness of breath particularly with exertion. She also notes lower extremity edema, which she usually doesn't get unless she is in severe CHF exacerbation. Has associated cough with chest pressure, which increases with cough.   She ran out of her Bumex due to the pharmacy not having it approximately one week ago. She has tried taking her Metolazone instead, which she usually takes 2-3 times per week PRN, but has been taking daily but has had persistent dyspnea and swelling despite this.   Patient on palliative dobutamine and not a candidate for advanced options.  Patient was seen by [facility] yesterday and found to be in heart failure exacerbations to so sent to for diuresis hospital

## 2024-11-19 NOTE — SUBJECTIVE & OBJECTIVE
Past Medical History:   Diagnosis Date    Allergy     Anemia     Arthritis     Atrial fibrillation     OAC    BMI 32.0-32.9,adult 02/22/2023    Chronic respiratory failure with hypoxia, on home oxygen therapy     2L with activity, off at rest.  Per Pulm  no overt evidence of ILD or COPD on PFTs and CT to explain O2 needs.    CKD (chronic kidney disease), stage IV 05/08/2018    Congestive heart failure     s/p AICD placement,    Deep vein thrombosis     Depression     elevated bilirubin d/t Gilbert's syndrome     confirmed by [place] genetic testing, evaluated by hepatology    Encounter for blood transfusion     GERD (gastroesophageal reflux disease)     Hypertension     Pheochromocytoma, malignant     Right kidney mass     Sleep apnea     Thalassemia trait, alpha     Thyroid disease     Type 2 diabetes mellitus with hyperglycemia, without long-term current use of insulin 08/13/2020       Past Surgical History:   Procedure Laterality Date    ANGIOGRAM, ABDOMINAL AORTA Right 04/15/2024    APPENDECTOMY      BONE MARROW BIOPSY      CARDIAC DEFIBRILLATOR PLACEMENT Left 12/2016    CARDIAC ELECTROPHYSIOLOGY MAPPING AND ABLATION      CARDIAC ELECTROPHYSIOLOGY MAPPING AND ABLATION      COLONOSCOPY N/A 05/06/2022    Procedure: COLONOSCOPY;  Surgeon: Arely Betancourt MD;  Location: [facility] ([address] [place] [address]);  Service: Endoscopy;  Laterality: N/A;  heart transplant candidate/ EF 25% - 2nd floor/ defib - Biotronik - [facility]  Eliquis - per Dr. Cortez with [facility], Pt ok to hold Eliquis x 2 days prior-see media tab-outside correspondence dated 12/30/21  - [facility]  verbal instructions/portal instructions/email instructions - s    EYE SURGERY      due to running tears    FRACTURE SURGERY Left     hand 5th digit    HYSTERECTOMY      KNEE SURGERY Left 2016    hematoma    LIVER BIOPSY  10/24/2018    Minimal steatosis, predominantly macrovesicular, 1%, Minimal nonspecific portal inflammation, no fibrosis. No findings on biopsy to explain elevated  "bilirubin levels. Could be d/t Gilbert's =?- hemolysis    RIGHT HEART CATHETERIZATION Right 12/07/2021    Procedure: INSERTION, CATHETER, RIGHT HEART;  Surgeon: Irving Cardenas MD;  Location: [facility] CATH LAB;  Service: Cardiology;  Laterality: Right;    RIGHT HEART CATHETERIZATION Right 12/19/2022    Procedure: INSERTION, CATHETER, RIGHT HEART;  Surgeon: Burke Camilo MD;  Location: [facility] CATH LAB;  Service: Cardiology;  Laterality: Right;    RIGHT HEART CATHETERIZATION Right 03/29/2023    Procedure: INSERTION, CATHETER, RIGHT HEART;  Surgeon: Katie Liriano DO;  Location: [facility] CATH LAB;  Service: Cardiology;  Laterality: Right;    TRANSJUGULAR BIOPSY OF LIVER N/A 10/24/2018    Procedure: BIOPSY, LIVER, TRANSJUGULAR APPROACH;  Surgeon: Carmen Diagnostic Provider;  Location: [facility] OR [address] [place] [address];  Service: Radiology;  Laterality: N/A;       Review of patient's allergies indicates:   Allergen Reactions    Penicillins Hives and Other (See Comments)    Iodinated contrast media Nausea And Vomiting    Oxycodone-acetaminophen Other (See Comments)     Nausea, Dizziness, Anxiety.  "I don't like how it makes me feel."   Given Hydromorphone 0.5mg IVP  Without problems.  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 Metabolism of other drugs not listed here may also be impacted by UGT1A1 enzyme activity.       Tramadol Nausea And Vomiting and Other (See Comments)     Other " reaction(s): Other (See Comments)    Valsartan Other (See Comments)       No current facility-administered medications on file prior to encounter.     Current Outpatient Medications on File Prior to Encounter   Medication Sig    acetaminophen (TYLENOL) 650 MG TbSR Take 1 tablet (650 mg total) by mouth every 8 (eight) hours. (Patient taking differently: Take 650 mg by mouth daily as needed (pain).)    albuterol-ipratropium (DUO-NEB) 2.5 mg-0.5 mg/3 mL nebulizer solution Take 3 mLs by nebulization every 6 (six) hours as needed for Wheezing or Shortness of Breath.    ALPRAZolam (XANAX) 2 MG Tab Take 1 tablet (2 mg total) by mouth 2 (two) times daily as needed.    amiodarone (PACERONE) 200 MG Tab take one tablet by mouth daily    amiodarone (PACERONE) 200 MG Tab Take 1 tablet (200 mg total) by mouth every 12 (twelve) hours for 30 days. Take twice daily until 11/10, then transition to once daily unless otherwise instructed by your cardiologist.    apixaban (ELIQUIS) 2.5 mg Tab take one tablet by mouth twice a day    apixaban (ELIQUIS) 5 mg Tab Take 1 tablet (5 mg total) by mouth 2 (two) times a day for 30 days.    aspirin (ECOTRIN) 81 MG EC tablet Take 81 mg by mouth once daily.    atorvastatin (LIPITOR) 40 MG tablet take one tablet by mouth in the evening    bumetanide (BUMEX) 2 MG tablet Take 2 tablets (4 mg total) by mouth 2 (two) times daily.    bumetanide (BUMEX) 2 MG tablet Take 1 tablet (2 mg total) by mouth 2 (two) times a day for 30 days.    cetirizine (ZYRTEC) 10 MG tablet Take 1 tablet (10 mg total) by mouth once daily.    cyanocobalamin (VITAMIN B-12) 1000 MCG tablet Take 1,000 mcg by mouth once daily.    DOBUTamine (DOBUTREX) 1,000 mg/250 mL (4,000 mcg/mL) infusion Inject 409 mcg/min into the vein continuous.    empagliflozin (JARDIANCE) 25 mg tablet take one tablet by mouth daily    ergocalciferol (VITAMIN D2) 50,000 unit Cap Take 1 capsule orally once a  week (Patient taking differently: Take 50,000 Units  by mouth every 7 days. Thursdays)    ferrous sulfate 325 (65 FE) MG EC tablet Take 325 mg by mouth once daily.    fluticasone propionate (FLONASE ALLERGY RELIEF) 50 mcg/actuation nasal spray Use 2 sprays (100 mcg total) by Each Nostril route once daily.    glimepiride (AMARYL) 1 MG tablet Take 1 tablet (1 mg total) by mouth before breakfast.    HYDROcodone-acetaminophen (NORCO)  mg per tablet Take 1 tablet by mouth every 6 (six) hours as needed for Pain.    hydrOXYzine HCL (ATARAX) 25 MG tablet take one tablet orally four times a day as needed for itching    isosorbide-hydrALAZINE 20-37.5 mg (BIDIL) 20-37.5 mg Tab Take 1 tablet by mouth 3 (three) times daily. (Patient taking differently: Take 1 tablet by mouth 2 (two) times daily.)    isosorbide-hydrALAZINE 20-37.5 mg (BIDIL) 20-37.5 mg Tab take 1 tablet orally twice a day    LIDOcaine (LIDODERM) 5 % Place 1 patch onto the skin once daily. Remove & discard patch within 12 hours or as directed by MD.    metOLazone (ZAROXOLYN) 5 MG tablet Take 1 tablet (5 mg total) by mouth daily as needed (Excess fluid).    metoprolol succinate (TOPROL-XL) 25 MG 24 hr tablet Take 0.5 tablets (12.5 mg total) by mouth once daily.    neomycin-polymyxin-dexamethasone (DEXACINE) 3.5 mg/g-10,000 unit/g-0.1 % Oint Place 1-2 drops into each eye three times daily for 7-10 days    nitroGLYCERIN (NITROSTAT) 0.4 MG SL tablet Put 1 tab under the tongue every 5 minutes x 3 doses as needed for chest pain. Do not exceed 3 doses in 15 minutes. If no relief, go to ER.    ondansetron (ZOFRAN-ODT) 8 MG TbDL Dissolve 1 tablet (8 mg total) by mouth every 8 (eight) hours as needed (nausea).    pantoprazole (PROTONIX) 40 MG tablet Take one tablet by mouth daily    sevelamer carbonate (RENVELA) 800 mg Tab take FOUR tablets orally three times a day with meals    SPIRIVA WITH HANDIHALER 18 mcg inhalation capsule Inhale 1 capsule (18 mcg total) into the lungs once daily.    torsemide (DEMADEX) 100 MG Tab  take one tablet by mouth daily in the morning    VENTOLIN HFA 90 mcg/actuation inhaler Inhale 2 puffs into the lungs every 6 (six) hours as needed for Shortness of Breath or Wheezing. (Patient taking differently: Inhale 2 puffs into the lungs daily as needed for Shortness of Breath or Wheezing.)    [DISCONTINUED] blood-glucose sensor (DEXCOM G7 SENSOR) Evon change sensor every 10 days.    [DISCONTINUED] magnesium oxide (MAG-OX) 400 mg (241.3 mg magnesium) tablet take one tablet by mouth daily (Patient not taking: Reported on 11/5/2024)    [DISCONTINUED] metoprolol succinate (TOPROL-XL) 25 MG 24 hr tablet take one tablet by mouth daily    [DISCONTINUED] pantoprazole (PROTONIX) 40 MG tablet take one tablet by mouth daily    [DISCONTINUED] polyethylene glycol (MIRALAX) 17 gram/dose powder Take 17 g by mouth once daily. (Patient taking differently: Take 17 g by mouth daily as needed for Constipation.)    [DISCONTINUED] potassium chloride SA (K-DUR,KLOR-CON) 20 MEQ tablet Take 1 tablet (20 mEq total) by mouth 2 (two) times daily.    [DISCONTINUED] semaglutide (OZEMPIC) 1 mg/dose (4 mg/3 mL) Inject 1 mg into the skin every 7 days.    [DISCONTINUED] vancomycin (VANCOCIN) 125 MG capsule take one capsule by mouth four times a day for four days     Family History       Problem Relation (Age of Onset)    Cancer Mother    Cirrhosis Brother    Diabetes Brother    Heart attack Father    Heart disease Father, Sister, Brother, Sister, Brother    Hypertension Father, Brother          Tobacco Use    Smoking status: Never    Smokeless tobacco: Never   Substance and Sexual Activity    Alcohol use: Not Currently     Comment: up to 1 yr ago drank 2-3 drinks on occasion but sporadic    Drug use: No    Sexual activity: Yes     Partners: Male     Review of Systems   All other systems reviewed and are negative.    Objective:     Vital Signs (Most Recent):  Temp: 98.3 °F (36.8 °C) (11/18/24 1957)  Pulse: 86 (11/18/24 2000)  Resp: 18 (11/18/24  "2100)  BP: 105/67 (11/18/24 1957)  SpO2: 99 % (11/18/24 1957) Vital Signs (24h Range):  Temp:  [98.3 °F (36.8 °C)] 98.3 °F (36.8 °C)  Pulse:  [85-89] 86  Resp:  [17-18] 18  SpO2:  [93 %-99 %] 99 %  BP: (105-119)/(65-73) 105/67     Weight: 87.2 kg (192 lb 3.9 oz)  Body mass index is 31.03 kg/m².     Physical Exam  Vitals and nursing note reviewed.   Constitutional:       General: She is not in acute distress.     Appearance: She is well-developed. She is ill-appearing (chronically). She is not diaphoretic.   HENT:      Head: Normocephalic and atraumatic.   Eyes:      General: No scleral icterus.     Conjunctiva/sclera: Conjunctivae normal.   Neck:      Vascular: No JVD.   Cardiovascular:      Rate and Rhythm: Normal rate. Rhythm irregular.      Heart sounds: Murmur heard.      Gallop present.   Pulmonary:      Effort: Pulmonary effort is normal. No respiratory distress.   Abdominal:      General: There is distension.      Tenderness: There is no abdominal tenderness.   Musculoskeletal:      Right lower leg: Edema present.      Left lower leg: Edema present.      Comments: Pitting edema to BL LEs   Skin:     Coloration: Skin is not jaundiced or pale.   Neurological:      Mental Status: She is alert and oriented to person, place, and time.      Motor: No abnormal muscle tone.   Psychiatric:         Mood and Affect: Mood normal.         Behavior: Behavior normal.                Significant Labs: All pertinent labs within the past 24 hours have been reviewed.  CBC: No results for input(s): "WBC", "HGB", "HCT", "PLT" in the last 48 hours.  CMP:   Recent Labs   Lab 11/18/24  1454      K 4.9      CO2 22*   *   BUN 82*   CREATININE 2.8*   CALCIUM 9.3   ANIONGAP 10       Significant Imaging: I have reviewed all pertinent imaging results/findings within the past 24 hours.  "
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
## 2024-11-19 NOTE — CHAPLAIN
Patient: Hafsa Hawley  MRN: [id number]  : 1965  Age: 59 y.o.  Legal sex: female   Hospital Length of Stay: 1 days  Code Status: Full Code   Attending Provider: Denny Elais DO  Principal Problem: Acute on chronic combined systolic and diastolic heart failure  Patient's Catholic: [employer]ism  Length of my visit: 20    Purpose of visit:   palliative care    The patient seemed withdrawn, and tired, with limited ability to focus. The patient requested prayer for healing, and mentioned having family as support system.    The  made introductions, and offered prayer for healing.         Felicinao Leija, [id number]  Resident , Rev.       support is available and on-site . Please call the on-call  for any emergent spiritual care needs, [phone].

## 2024-11-19 NOTE — PROGRESS NOTES
"  [facility] - Cardiology Stepdown  Adult Nutrition  Consult Note    SUMMARY     Recommendations    Continue Diabetic diet, low Na, 1200 mL fluid.   Encourage good intakes  Nursing please document % intakes in chart.   Education provided on Low Na/Fluid restriction.   RD to monitor weights, intakes, labs      Goals:   Meet % of nutritional needs with diet  Maintain weight during admission.     Nutrition Goal Status: new  Communication of RD Recs: other (comment) (POC)    Assessment and Plan    No nutrition diagnosis at this time      Malnutrition Assessment    NFPE to be conducted if clinically indicated     Reason for Assessment    Reason For Assessment: consult  Diagnosis: cardiac disease, diabetes diagnosis/complications, pulmonary disease, renal disease, other (see comments) (altered GI funciton-IBS)  General Information Comments: RD consulted for Fluid/Na restricition education, and patient with MST score of 3. Currently admitted for acute on CHF, PMHx: HTN, MELISSA, NICM, ICD, paroxymal supraventricular tachycardia, chronic respiratory failure w/ hypoxia, CKD IV, Anemia 2/2 chronic disease, HLD, ESHF, T2DM, IBS, neuropathy, gout, restrictive lung disease, nausea, fatigue. RD attempted to see patient at bedside. Pt extremely tired during encounter, answered some questions and materials were briefly covered but requested to be visited at another time. Noted 19 lb weight gain x 6 months.  No concerns for unintended weight loss at this time.  Nutrition Discharge Planning: Low Na/Fluid diet    Nutrition Risk Screen    Nutrition Risk Screen: no indicators present    Nutrition Related Social Determinants of Health: SDOH: Adequate food in home environment    Nutrition/Diet History    Spiritual, Cultural Beliefs, [employer] Practices, Values that Affect Care: no  Food Allergies: NKFA    Anthropometrics    Temp: 97.5 °F (36.4 °C)  Height: 5' 6" (167.6 cm)  Height (inches): 66 in  Weight Method: Standard Scale  Weight: " 87.2 kg (192 lb 3.9 oz)  Weight (lb): 192.24 lb  Ideal Body Weight (IBW), Female: 130 lb  % Ideal Body Weight, Female (lb): 147.88 %  BMI (Calculated): 31  BMI Grade: 30 - 34.9- obesity - grade I       Lab/Procedures/Meds    Pertinent Labs Reviewed: reviewed  Pertinent Labs Comments: H/H: 9.4/32.4, MCV: 67, MCH: 19.5, MCHC: 29, BUN: 90, Creatinine: 2.8, GFR: 18.9, Glucose: 47  Pertinent Medications Reviewed: reviewed  Pertinent Medications Comments: albuterol, aluminum-magnesium hydroxide-simethicone, atorvastatin, bumetanide, dobutamine,  empagliflozin, ferrous sulfate, furosemide, isosorbide, mononitrate, metoprolol succinate, ondansetron, sevelamer carbonate, simethicone, tiotropium bromide, bowel regimen      Estimated/Assessed Needs    Weight Used For Calorie Calculations: 87.2 kg (192 lb 3.9 oz)  Energy Calorie Requirements (kcal): 1829 kcal  Energy Need Method: Pratt-St Jeor  Protein Requirements: 35-66 g (0.6- 1.12 g/kg IBW (CKD-HF NCM))  Weight Used For Protein Calculations: 59 kg (130 lb 1.1 oz)  Fluid Requirements (mL): 1200 mL (Fluid restriction)  Estimated Fluid Requirement Method: other (see comments) (diet order)  RDA Method (mL): 1829  CHO Requirement: 206-297 g      Nutrition Prescription Ordered    Current Diet Order: Diabetic (2000 kcal, 75g), 2g Na, 1200mL fluid    Evaluation of Received Nutrient/Fluid Intake    I/O: -0.1 L since admin  Comments: LBM: 11/18  % Intake of Estimated Energy Needs: 50 - 75 %  % Meal Intake: 50 - 75 %    Nutrition Risk    Level of Risk/Frequency of Follow-up: low - moderate       Monitor and Evaluation    Food and Nutrient Intake: energy intake, food and beverage intake  Food and Nutrient Adminstration: diet order  Knowledge/Beliefs/Attitudes: food and nutrition knowledge/skill, beliefs and attitudes  Physical Activity and Function: nutrition-related ADLs and IADLs  Anthropometric Measurements: weight, weight change, body mass index  Biochemical Data, Medical Tests  and Procedures: gastrointestinal profile, electrolyte and renal panel, glucose/endocrine profile, inflammatory profile, lipid profile  Nutrition-Focused Physical Findings: overall appearance       Nutrition Follow-Up    RD Follow-up?: Yes    Michael Solo, Dietetic Intern  I certify that I directed the dietetic intern in service delivery and guided them using my skilled judgment. As the cosigning dietitian, I have reviewed the dietetic interns documentation and am responsible for the treatment, assessment, and plan.

## 2024-11-19 NOTE — ASSESSMENT & PLAN NOTE
Advance Care Planning     [facility]  I engaged the patient in a voluntary conversation about advance care planning and we specifically addressed what the goals of care would be moving forward, in light of the patient's change in clinical status, specifically advanced CHF.  We did specifically address the patient's likely prognosis, which is poor.  We explored the patient's values and preferences for future care.  The patient endorses that what is most important right now is to focus on  TBD, pending further discussions with palliative care team    Accordingly, we have decided that the best plan to meet the patient's goals includes  TBD    A total of 10 min was spent on advance care planning, goals of care discussion, emotional support, formulating and communicating prognosis and exploring burden/benefit of various approaches of treatment. This discussion occurred on a fully voluntary basis with the verbal consent of the patient and/or family.

## 2024-11-19 NOTE — ASSESSMENT & PLAN NOTE
Recent A1c 5.7, well-controlled likely due to advancing CKD. Continue home Jardiance for CHF. No indication for insulin at this time, as to avoid hypoglycemia in advanced CKD.

## 2024-11-19 NOTE — CARE UPDATE
Unit [facility] Care Support Interaction      I have reviewed the chart of Hafsa Hawley who is hospitalized for Acute on chronic combined systolic and diastolic heart failure. The patient is currently located in the following unit: CSU        I have assisted the primary physician in management of the following:      Central Line - Present on admission to the unit - ordered blood cultures  MRSA Decolonization - Mupirocin ordered and CHG ordered         Yanely Garg PA-C  Unit Based [facility]

## 2024-11-19 NOTE — ASSESSMENT & PLAN NOTE
Verified by PDMP. Continue reduced dose of home regimen as to not precipitate withdrawal, however cautiously as this is likely not an optimal medication for this patient with multiple advanced comorbidities and chronic respiratory failure.

## 2024-11-19 NOTE — PROGRESS NOTES
[facility] - Cardiology [facility] Medicine  Progress Note    Patient Name: Hafsa Hawley  MRN: [id number]  Patient Class: IP- Inpatient   Admission Date: 11/18/2024  Length of Stay: 1 days  Attending Physician: Denny Elias DO  Primary Care Provider: Cristobal Ann MD        Subjective:     Principal Problem:Acute on chronic combined systolic and diastolic heart failure        HPI:  Hafsa Hawley is a 59 y.o. female with severe combined CHF (EF 10-15%) with ICD in place, AFib and VTach, CKD 4, T2DM not on insulin, restrictive lung disease, MELISSA, anemia, chronic pain on opioids, chronic prescription benzodiazepine use who presents from heart failure clinic for CHF exacerbation.     She reports that over the past few days, she has had an increase in her chronic shortness of breath particularly with exertion. She also notes lower extremity edema, which she usually doesn't get unless she is in severe CHF exacerbation. Has associated cough with chest pressure, which increases with cough.     She ran out of her Bumex due to the pharmacy not having it approximately one week ago. She has tried taking her Metolazone instead, which she usually takes 2-3 times per week PRN, but has been taking daily but has had persistent dyspnea and swelling despite this.     She presented to Cardiology clinic today, who recommended she be admitted for diuresis.     Overview/Hospital Course:  Referred from \[facility]\"" clinic due to concern for decompensated CHF. Not taking bumex x10 days. Cardiology consulted on arrival.  Started on IVP diuresis with minimal UOP. S/p lasix 80mg IVP x2 with lasix ggt, titrating as indicated.  Given lack of candidacy for transplant, pt reportedly went to [facility] for second opinion. This was apparently reiterated to pt. Palliative care team now on board to discuss goals of care given poor prognosis and lack of options.    Interval History: Seen this AM at bedside.  No acute events reported overnight.  Member of palliative team also at bedside to assist with GOC conversations. Pt is tearful. States that she is having some improvement of dyspnea, does not feel at baseline. Denies chest pain    Review of Systems  Objective:     Vital Signs (Most Recent):  Temp: 97.5 °F (36.4 °C) (11/19/24 1136)  Pulse: 84 (11/19/24 1136)  Resp: 19 (11/19/24 1136)  BP: 114/62 (11/19/24 1136)  SpO2: 98 % (11/19/24 1136) Vital Signs (24h Range):  Temp:  [97.4 °F (36.3 °C)-98.3 °F (36.8 °C)] 97.5 °F (36.4 °C)  Pulse:  [77-89] 84  Resp:  [16-19] 19  SpO2:  [93 %-99 %] 98 %  BP: ()/(57-79) 114/62     Weight: 87.2 kg (192 lb 3.9 oz)  Body mass index is 31.03 kg/m².    Intake/Output Summary (Last 24 hours) at 11/19/2024 1218  Last data filed at 11/19/2024 0944  Gross per 24 hour   Intake 240 ml   Output 200 ml   Net 40 ml         Physical Exam  Vitals and nursing note reviewed.   Constitutional:       General: She is not in acute distress.     Appearance: She is well-developed. She is ill-appearing (chronically). She is not diaphoretic.   HENT:      Head: Normocephalic and atraumatic.      Mouth/Throat:      Mouth: Mucous membranes are moist.   Eyes:      Pupils: Pupils are equal, round, and reactive to light.   Neck:      Vascular: No JVD.   Cardiovascular:      Rate and Rhythm: Normal rate. Rhythm irregular.      Heart sounds: Murmur heard.   Pulmonary:      Effort: Pulmonary effort is normal. No respiratory distress.   Abdominal:      General: There is distension.      Tenderness: There is no abdominal tenderness.   Musculoskeletal:      Right lower leg: Edema present.      Left lower leg: Edema present.      Comments: Pitting edema to BL LEs   Skin:     Coloration: Skin is not jaundiced or pale.   Neurological:      General: No focal deficit present.      Mental Status: She is alert and oriented to person, place, and time.      Motor: No abnormal muscle tone.   Psychiatric:         Mood and Affect: Mood normal.         Behavior:  Behavior normal.         Thought Content: Thought content normal.             Significant Labs: All pertinent labs within the past 24 hours have been reviewed.    Significant Imaging: I have reviewed all pertinent imaging results/findings within the past 24 hours.    Assessment/Plan:      * Acute on chronic combined systolic and diastolic heart failure  -Patient with known severe CHF with last EF 10-15% and Grade 2 DD on TTE 5/2024, on chronic dobutamine gtt. Followed by advanced HF clinic, and has been considered for transplant however declined by 2 facilities. Presents with signs of volume overload on exam and worsening dyspnes, consistent with CHF exacerbation.   -TTE redemonstrating reduced EF although slighlty higher 15-20%. CVP 15mmHg  Plan:  -Minimal UOP on bumex. Discussed with cardiology team. Lasix ggt at 20, IVP lasix 80mg x2 today  -Monitor UOP closely  - Will continue home dobutamine as well. Continue GDMT as tolerated, although limited chronically due to advanced CKD and softer BPs.  -Consult Cardiology for assistance with management while inpatient.   -Will obtain daily weights and monitor renal function and electrolytes on serial labs. Strict intake and output along with fluid restricted diet. Monitor on telemetry.     Goals of care, counseling/discussion  Advance Care Planning    [facility]  I engaged the patient in a voluntary conversation about advance care planning and we specifically addressed what the goals of care would be moving forward, in light of the patient's change in clinical status, specifically advanced CHF.  We did specifically address the patient's likely prognosis, which is poor.  We explored the patient's values and preferences for future care.  The patient endorses that what is most important right now is to focus on  TBD, pending further discussions with palliative care team    Accordingly, we have decided that the best plan to meet the patient's goals includes  TBD    A total of 10  min was spent on advance care planning, goals of care discussion, emotional support, formulating and communicating prognosis and exploring burden/benefit of various approaches of treatment. This discussion occurred on a fully voluntary basis with the verbal consent of the patient and/or family.          Chronic pain with opioid use  Continue home regimen as verified by PDMP.      Type 2 diabetes mellitus without complication, without long-term current use of insulin  Recent A1c 5.7, well-controlled likely due to advancing CKD. Continue home Jardiance for CHF. No indication for insulin at this time, as to avoid hypoglycemia in advanced CKD.      Hyperlipidemia associated with type 2 diabetes mellitus  Continue hospital formulary of home statin.       Anemia of chronic disease  Stable, and around baseline without indication for transfusion. Monitor.     Paroxysmal A-fib  Patient has persistent (7 days or more) atrial fibrillation. [id number] Score: 3. The patients heart rate in the last 24 hours is as follows:  Pulse  Min: 85  Max: 89     Antiarrhythmics  amiodarone tablet 200 mg, Daily, Oral  metoprolol succinate (TOPROL-XL) 24 hr split tablet 12.5 mg, Daily, Oral    Anticoagulants  apixaban tablet 2.5 mg, 2 times daily, Oral    Plan  - Replete lytes with a goal of K>4, Mg >2  - Patient is anticoagulated, see medications listed above.  - Patient's afib is currently controlled  - Continue home meds.     Pulmonary HTN  Continue diuresis with goal of euvolemia.      CKD (chronic kidney disease), stage IV  Renal function remains around baseline; Estimated Creatinine Clearance: 24.1 mL/min (A) (based on SCr of 2.8 mg/dL (H)). according to latest data. Based on current GFR, CKD stage is stage 4 - GFR 15-29. Will avoid nephrotoxic agents as able, and renally dose all meds as applicable.    Chronic prescription benzodiazepine use  Verified by PDMP. Continue reduced dose of home regimen as to not precipitate withdrawal,  however cautiously as this is likely not an optimal medication for this patient with multiple advanced comorbidities and chronic respiratory failure.       Chronic respiratory failure with hypoxia  Reports using home oxygen, likely with chronic intermittent respiratory failure due to restrictive lung disease on PFTs and advanced CHF. Wean oxygen to maintain sats 90% or greater.     Paroxysmal supraventricular tachycardia  Continue Amiodarone (started during recent hospitalization in [place]). Monitor on telemetry.       MELISSA (obstructive sleep apnea)  Non-compliant with CPAP. Monitor.       Essential hypertension  Chronic, and currently controlled. Latest blood pressure and vitals reviewed-   While in the hospital, will manage blood pressure as follows; Continue home antihypertensive regimen along with diuresis.    Will utilize p.r.n. blood pressure medication only if patient's blood pressure greater than 180/110 and she develops symptoms such as worsening chest pain or shortness of breath.      VTE Risk Mitigation (From admission, onward)           Ordered     apixaban tablet 5 mg  2 times daily         11/19/24 1214     Reason for No Pharmacological VTE Prophylaxis  Once        Question:  Reasons:  Answer:  Already adequately anticoagulated on oral Anticoagulants    11/18/24 1904     IP VTE HIGH RISK PATIENT  Once         11/18/24 1904     Place sequential compression device  Until discontinued         11/18/24 1904                    Discharge Planning   CHIQUI: 11/21/2024     Code Status: Full Code   Is the patient medically ready for discharge?:     Reason for patient still in hospital (select all that apply): Patient trending condition                     Denny Elias DO  Department of Hospital Medicine   [facility] - Cardiology Stepdown

## 2024-11-19 NOTE — NURSING NOTE
Patient identified by 2 identifiers. Allergies reviewed & procedure explained. Pt verbalized understanding. Double Lumen PICC IV in place to KELIN, flushed w/ 10cc NS pre & post contrast administration.  1.5cc Definity administered by sonographer, echo images obtained.  Pt tolerated procedure well.

## 2024-11-19 NOTE — ASSESSMENT & PLAN NOTE
Patient with known severe CHF with last EF 10-15% and Grade 2 DD on TTE 5/2024, on chronic dobutamine gtt. Followed by advanced HF clinic, and has been considered for transplant however declined by 2 facilities. Presents with signs of volume overload on exam and worsening dyspnes, consistent with CHF exacerbation. Placed on CHF pathway.  -Will continue diuresis with Bumex IV while monitoring UOP and for improvement in symptoms. Will continue home dobutamine as well. Continue GDMT as tolerated, although limited chronically due to advanced CKD and softer BPs.  -Consult Cardiology for assistance with management while inpatient.   -Will obtain daily weights and monitor renal function and electrolytes on serial labs. Strict intake and output along with fluid restricted diet. Monitor on telemetry.

## 2024-11-19 NOTE — SUBJECTIVE & OBJECTIVE
Interval History: Seen this AM at bedside.  No acute events reported overnight. Member of palliative team also at bedside to assist with GOC conversations. Pt is tearful. States that she is having some improvement of dyspnea, does not feel at baseline. Denies chest pain    Review of Systems  Objective:     Vital Signs (Most Recent):  Temp: 97.5 °F (36.4 °C) (11/19/24 1136)  Pulse: 84 (11/19/24 1136)  Resp: 19 (11/19/24 1136)  BP: 114/62 (11/19/24 1136)  SpO2: 98 % (11/19/24 1136) Vital Signs (24h Range):  Temp:  [97.4 °F (36.3 °C)-98.3 °F (36.8 °C)] 97.5 °F (36.4 °C)  Pulse:  [77-89] 84  Resp:  [16-19] 19  SpO2:  [93 %-99 %] 98 %  BP: ()/(57-79) 114/62     Weight: 87.2 kg (192 lb 3.9 oz)  Body mass index is 31.03 kg/m².    Intake/Output Summary (Last 24 hours) at 11/19/2024 1212  Last data filed at 11/19/2024 0944  Gross per 24 hour   Intake 240 ml   Output 200 ml   Net 40 ml         Physical Exam  Vitals and nursing note reviewed.   Constitutional:       General: She is not in acute distress.     Appearance: She is well-developed. She is ill-appearing (chronically). She is not diaphoretic.   HENT:      Head: Normocephalic and atraumatic.      Mouth/Throat:      Mouth: Mucous membranes are moist.   Eyes:      Pupils: Pupils are equal, round, and reactive to light.   Neck:      Vascular: No JVD.   Cardiovascular:      Rate and Rhythm: Normal rate. Rhythm irregular.      Heart sounds: Murmur heard.   Pulmonary:      Effort: Pulmonary effort is normal. No respiratory distress.   Abdominal:      General: There is distension.      Tenderness: There is no abdominal tenderness.   Musculoskeletal:      Right lower leg: Edema present.      Left lower leg: Edema present.      Comments: Pitting edema to BL LEs   Skin:     Coloration: Skin is not jaundiced or pale.   Neurological:      General: No focal deficit present.      Mental Status: She is alert and oriented to person, place, and time.      Motor: No abnormal  muscle tone.   Psychiatric:         Mood and Affect: Mood normal.         Behavior: Behavior normal.         Thought Content: Thought content normal.             Significant Labs: All pertinent labs within the past 24 hours have been reviewed.    Significant Imaging: I have reviewed all pertinent imaging results/findings within the past 24 hours.

## 2024-11-20 LAB
ANION GAP SERPL CALC-SCNC: 12 MMOL/L (ref 8–16)
ANION GAP SERPL CALC-SCNC: 12 MMOL/L (ref 8–16)
ANION GAP SERPL CALC-SCNC: 13 MMOL/L (ref 8–16)
ANISOCYTOSIS BLD QL SMEAR: ABNORMAL
BACTERIA #/AREA URNS AUTO: ABNORMAL /HPF
BASOPHILS # BLD AUTO: 0.03 K/UL (ref 0–0.2)
BASOPHILS NFR BLD: 0.7 % (ref 0–1.9)
BILIRUB UR QL STRIP: NEGATIVE
BUN SERPL-MCNC: 96 MG/DL (ref 6–20)
CALCIUM SERPL-MCNC: 8.9 MG/DL (ref 8.7–10.5)
CALCIUM SERPL-MCNC: 9.2 MG/DL (ref 8.7–10.5)
CALCIUM SERPL-MCNC: 9.3 MG/DL (ref 8.7–10.5)
CHLORIDE SERPL-SCNC: 104 MMOL/L (ref 95–110)
CHLORIDE SERPL-SCNC: 105 MMOL/L (ref 95–110)
CHLORIDE SERPL-SCNC: 105 MMOL/L (ref 95–110)
CLARITY UR REFRACT.AUTO: ABNORMAL
CO2 SERPL-SCNC: 21 MMOL/L (ref 23–29)
CO2 SERPL-SCNC: 21 MMOL/L (ref 23–29)
CO2 SERPL-SCNC: 25 MMOL/L (ref 23–29)
COLOR UR AUTO: COLORLESS
CREAT SERPL-MCNC: 3 MG/DL (ref 0.5–1.4)
CREAT SERPL-MCNC: 3 MG/DL (ref 0.5–1.4)
CREAT SERPL-MCNC: 3.1 MG/DL (ref 0.5–1.4)
DIFFERENTIAL METHOD BLD: ABNORMAL
EOSINOPHIL # BLD AUTO: 0.1 K/UL (ref 0–0.5)
EOSINOPHIL NFR BLD: 1.2 % (ref 0–8)
ERYTHROCYTE [DISTWIDTH] IN BLOOD BY AUTOMATED COUNT: 35 % (ref 11.5–14.5)
EST. GFR  (NO RACE VARIABLE): 16.7 ML/MIN/1.73 M^2
EST. GFR  (NO RACE VARIABLE): 17.4 ML/MIN/1.73 M^2
EST. GFR  (NO RACE VARIABLE): 17.4 ML/MIN/1.73 M^2
GLUCOSE SERPL-MCNC: 101 MG/DL (ref 70–110)
GLUCOSE SERPL-MCNC: 74 MG/DL (ref 70–110)
GLUCOSE SERPL-MCNC: 98 MG/DL (ref 70–110)
GLUCOSE UR QL STRIP: ABNORMAL
HCT VFR BLD AUTO: 33.2 % (ref 37–48.5)
HGB BLD-MCNC: 9.4 G/DL (ref 12–16)
HGB UR QL STRIP: ABNORMAL
HOWELL-JOLLY BOD BLD QL SMEAR: ABNORMAL
HYALINE CASTS UR QL AUTO: 14 /LPF
HYPOCHROMIA BLD QL SMEAR: ABNORMAL
IMM GRANULOCYTES # BLD AUTO: 0.03 K/UL (ref 0–0.04)
IMM GRANULOCYTES NFR BLD AUTO: 0.7 % (ref 0–0.5)
KETONES UR QL STRIP: NEGATIVE
LEUKOCYTE ESTERASE UR QL STRIP: ABNORMAL
LYMPHOCYTES # BLD AUTO: 0.8 K/UL (ref 1–4.8)
LYMPHOCYTES NFR BLD: 18.6 % (ref 18–48)
MAGNESIUM SERPL-MCNC: 2.3 MG/DL (ref 1.6–2.6)
MCH RBC QN AUTO: 18.9 PG (ref 27–31)
MCHC RBC AUTO-ENTMCNC: 28.3 G/DL (ref 32–36)
MCV RBC AUTO: 67 FL (ref 82–98)
MICROSCOPIC COMMENT: ABNORMAL
MONOCYTES # BLD AUTO: 0.6 K/UL (ref 0.3–1)
MONOCYTES NFR BLD: 14.8 % (ref 4–15)
NEUTROPHILS # BLD AUTO: 2.8 K/UL (ref 1.8–7.7)
NEUTROPHILS NFR BLD: 64 % (ref 38–73)
NITRITE UR QL STRIP: NEGATIVE
NRBC BLD-RTO: 18 /100 WBC
OVALOCYTES BLD QL SMEAR: ABNORMAL
PH UR STRIP: 5 [PH] (ref 5–8)
PHOSPHATE SERPL-MCNC: 5.2 MG/DL (ref 2.7–4.5)
PLATELET # BLD AUTO: 133 K/UL (ref 150–450)
PLATELET BLD QL SMEAR: ABNORMAL
PMV BLD AUTO: ABNORMAL FL (ref 9.2–12.9)
POCT GLUCOSE: 163 MG/DL (ref 70–110)
POIKILOCYTOSIS BLD QL SMEAR: ABNORMAL
POLYCHROMASIA BLD QL SMEAR: ABNORMAL
POTASSIUM SERPL-SCNC: 4.4 MMOL/L (ref 3.5–5.1)
POTASSIUM SERPL-SCNC: 4.7 MMOL/L (ref 3.5–5.1)
POTASSIUM SERPL-SCNC: 5.1 MMOL/L (ref 3.5–5.1)
PROT UR QL STRIP: ABNORMAL
RBC # BLD AUTO: 4.97 M/UL (ref 4–5.4)
RBC #/AREA URNS AUTO: >100 /HPF (ref 0–4)
SCHISTOCYTES BLD QL SMEAR: ABNORMAL
SCHISTOCYTES BLD QL SMEAR: PRESENT
SODIUM SERPL-SCNC: 138 MMOL/L (ref 136–145)
SODIUM SERPL-SCNC: 139 MMOL/L (ref 136–145)
SODIUM SERPL-SCNC: 141 MMOL/L (ref 136–145)
SP GR UR STRIP: 1.01 (ref 1–1.03)
SPHEROCYTES BLD QL SMEAR: ABNORMAL
SQUAMOUS #/AREA URNS AUTO: 1 /HPF
TARGETS BLD QL SMEAR: ABNORMAL
URN SPEC COLLECT METH UR: ABNORMAL
WBC # BLD AUTO: 4.31 K/UL (ref 3.9–12.7)
WBC #/AREA URNS AUTO: 26 /HPF (ref 0–5)

## 2024-11-20 PROCEDURE — 99232 SBSQ HOSP IP/OBS MODERATE 35: CPT | Mod: ,,, | Performed by: STUDENT IN AN ORGANIZED HEALTH CARE EDUCATION/TRAINING PROGRAM

## 2024-11-20 PROCEDURE — 27000221 HC OXYGEN, UP TO 24 HOURS

## 2024-11-20 PROCEDURE — 94761 N-INVAS EAR/PLS OXIMETRY MLT: CPT

## 2024-11-20 PROCEDURE — 85025 COMPLETE CBC W/AUTO DIFF WBC: CPT | Performed by: STUDENT IN AN ORGANIZED HEALTH CARE EDUCATION/TRAINING PROGRAM

## 2024-11-20 PROCEDURE — 36415 COLL VENOUS BLD VENIPUNCTURE: CPT | Performed by: STUDENT IN AN ORGANIZED HEALTH CARE EDUCATION/TRAINING PROGRAM

## 2024-11-20 PROCEDURE — 81001 URINALYSIS AUTO W/SCOPE: CPT | Performed by: STUDENT IN AN ORGANIZED HEALTH CARE EDUCATION/TRAINING PROGRAM

## 2024-11-20 PROCEDURE — 80048 BASIC METABOLIC PNL TOTAL CA: CPT | Performed by: STUDENT IN AN ORGANIZED HEALTH CARE EDUCATION/TRAINING PROGRAM

## 2024-11-20 PROCEDURE — 94640 AIRWAY INHALATION TREATMENT: CPT

## 2024-11-20 PROCEDURE — 63600175 PHARM REV CODE 636 W HCPCS: Performed by: STUDENT IN AN ORGANIZED HEALTH CARE EDUCATION/TRAINING PROGRAM

## 2024-11-20 PROCEDURE — 25000003 PHARM REV CODE 250: Performed by: STUDENT IN AN ORGANIZED HEALTH CARE EDUCATION/TRAINING PROGRAM

## 2024-11-20 PROCEDURE — 20600001 HC STEP DOWN PRIVATE ROOM

## 2024-11-20 PROCEDURE — 83735 ASSAY OF MAGNESIUM: CPT | Performed by: STUDENT IN AN ORGANIZED HEALTH CARE EDUCATION/TRAINING PROGRAM

## 2024-11-20 PROCEDURE — 87086 URINE CULTURE/COLONY COUNT: CPT | Performed by: STUDENT IN AN ORGANIZED HEALTH CARE EDUCATION/TRAINING PROGRAM

## 2024-11-20 PROCEDURE — 84100 ASSAY OF PHOSPHORUS: CPT | Performed by: STUDENT IN AN ORGANIZED HEALTH CARE EDUCATION/TRAINING PROGRAM

## 2024-11-20 RX ORDER — FUROSEMIDE 10 MG/ML
80 INJECTION INTRAMUSCULAR; INTRAVENOUS ONCE
Status: COMPLETED | OUTPATIENT
Start: 2024-11-20 | End: 2024-11-20

## 2024-11-20 RX ADMIN — MUPIROCIN: 20 OINTMENT TOPICAL at 10:11

## 2024-11-20 RX ADMIN — DOBUTAMINE HYDROCHLORIDE 5 MCG/KG/MIN: 400 INJECTION INTRAVENOUS at 02:11

## 2024-11-20 RX ADMIN — SEVELAMER CARBONATE 1600 MG: 800 TABLET, FILM COATED ORAL at 12:11

## 2024-11-20 RX ADMIN — MUPIROCIN: 20 OINTMENT TOPICAL at 09:11

## 2024-11-20 RX ADMIN — APIXABAN 5 MG: 5 TABLET, FILM COATED ORAL at 09:11

## 2024-11-20 RX ADMIN — ISOSORBIDE MONONITRATE 30 MG: 30 TABLET, EXTENDED RELEASE ORAL at 09:11

## 2024-11-20 RX ADMIN — AMIODARONE HYDROCHLORIDE 200 MG: 200 TABLET ORAL at 09:11

## 2024-11-20 RX ADMIN — ATORVASTATIN CALCIUM 40 MG: 20 TABLET, FILM COATED ORAL at 09:11

## 2024-11-20 RX ADMIN — ASPIRIN 81 MG: 81 TABLET, COATED ORAL at 09:11

## 2024-11-20 RX ADMIN — GUAIFENESIN 200 MG: 200 SOLUTION ORAL at 10:11

## 2024-11-20 RX ADMIN — FERROUS SULFATE TAB EC 325 MG (65 MG FE EQUIVALENT) 1 EACH: 325 (65 FE) TABLET DELAYED RESPONSE at 09:11

## 2024-11-20 RX ADMIN — TIOTROPIUM BROMIDE INHALATION SPRAY 2 PUFF: 3.12 SPRAY, METERED RESPIRATORY (INHALATION) at 08:11

## 2024-11-20 RX ADMIN — ONDANSETRON 8 MG: 8 TABLET, ORALLY DISINTEGRATING ORAL at 08:11

## 2024-11-20 RX ADMIN — FUROSEMIDE 20 MG/HR: 10 INJECTION, SOLUTION INTRAMUSCULAR; INTRAVENOUS at 04:11

## 2024-11-20 RX ADMIN — EMPAGLIFLOZIN 25 MG: 25 TABLET, FILM COATED ORAL at 09:11

## 2024-11-20 RX ADMIN — FUROSEMIDE 80 MG: 10 INJECTION, SOLUTION INTRAVENOUS at 12:11

## 2024-11-20 NOTE — PLAN OF CARE
Patient cooperative with routine care and procedures.  Withdrawn and quiet but answers questions appropriately.  Fall precautions reviewed and patient verbalized understanding.  Barton catheter in place for accurate output.  Patient resting quietly and without complaints.  Will continue to monitor.

## 2024-11-20 NOTE — PROGRESS NOTES
[facility]  Cardiology  Progress Note    Patient Name: Hafsa Hawley  MRN: [id number]  Admission Date: 11/18/2024  Hospital Length of Stay: 2 days  Code Status: Full Code   Attending Physician: Denny Elias DO   Primary Care Physician: Cristobal Ann MD  Expected Discharge Date: 11/22/2024  Principal Problem:Acute on chronic combined systolic and diastolic heart failure    Subjective:     Hospital Course:   No notes on file    Interval History: patient was initially started on lasix 10mg/hr later switched to 20 mg /hr yesterday. Since yesterday she has been net negative 1600. Palliative has seen her and patient seem reluctant to talk about goals of care discussion. Currently full code.    ROS  Objective:     Vital Signs (Most Recent):  Temp: 98.2 °F (36.8 °C) (11/20/24 1100)  Pulse: 92 (11/20/24 1113)  Resp: 20 (11/20/24 1100)  BP: 110/66 (11/20/24 1100)  SpO2: 96 % (11/20/24 1100) Vital Signs (24h Range):  Temp:  [97.4 °F (36.3 °C)-98.2 °F (36.8 °C)] 98.2 °F (36.8 °C)  Pulse:  [81-97] 92  Resp:  [14-20] 20  SpO2:  [96 %-100 %] 96 %  BP: (106-126)/(59-80) 110/66     Weight: 86.2 kg (190 lb 0.6 oz)  Body mass index is 30.67 kg/m².     SpO2: 96 %         Intake/Output Summary (Last 24 hours) at 11/20/2024 1129  Last data filed at 11/20/2024 0936  Gross per 24 hour   Intake 1267.65 ml   Output 2050 ml   Net -782.35 ml       Lines/Drains/Airways       Peripherally Inserted Central Catheter Line  Duration             PICC Double Lumen 05/12/23 1534 right brachial 557 days              Drain  Duration                  Urethral Catheter 11/19/24 1754 Straight-tip 16 Fr. <1 day                       Physical Exam     Constitutional:       Appearance: Normal appearance. She is obese.   Eyes:      Pupils: Pupils are equal, round, and reactive to light.   Neck:      Comments: JVD present   Cardiovascular:      Rate and Rhythm: Normal rate and regular rhythm.      Pulses: Normal pulses.      Heart  sounds: Murmur heard.   Pulmonary:      Effort: Pulmonary effort is normal.      Breath sounds: Rales present.   Abdominal:      General: Abdomen is flat.      Palpations: Abdomen is soft.   Musculoskeletal:      Cervical back: Normal range of motion.      Right lower leg: Edema present.      Left lower leg: Edema present.   Neurological:      General: No focal deficit present.      Mental Status: She is alert and oriented to person, place, and time.   Psychiatric:         Mood and Affect: Mood normal.         Behavior: Behavior normal.     Significant Labs: All pertinent lab results from the last 24 hours have been reviewed.    Significant Imaging: Echocardiogram: Transthoracic echo (TTE) complete (Cupid Only):   Results for orders placed or performed during the hospital encounter of 11/18/24   Echo   Result Value Ref Range    LV Diastolic Volume 227.02 mL    Echo EF Estimated 21 %    LV Systolic Volume 179.58 mL    IVS 1.1 0.6 - 1.1 cm    LVIDd 6.6 (A) 3.5 - 6.0 cm    LVIDs 6.0 (A) 2.1 - 4.0 cm    LVOT diameter 2.0 cm    PW 1.2 (A) 0.6 - 1.1 cm    AV LVOT peak gradient 2 mmHg    LVOT mn grad 1 mmHg    LVOT peak marcos 0.7 m/s    LVOT peak VTI 12.7 cm    RV- carrillo basal diam 6.6 cm    RV S' 10.22 cm/s    LA size 5.50 cm    Left Atrium Major Axis 7.04 cm    Left Atrium Minor Axis 7.01 cm    LA Vol (MOD) 148.46 mL    RA Major Axis 6.92 cm    RA Area 32.2 cm2    AV valve area 2.2 cm2    AV area by cont VTI 2.1 cm2    AV peak gradient 4.8 mmHg    AV mean gradient 2.3 mmHg    Ao peak marcos 1.1 m/s    Ao VTI 18.2 cm    MV Peak A Marcos 0.27 m/s    E wave deceleration time 185.89 ms    MV Peak E Marcos 0.48 m/s    E/A ratio 1.78     LV LATERAL E/E' RATIO 6.00     LV SEPTAL E/E' RATIO 9.60     TDI LATERAL 0.08 m/s    TDI SEPTAL 0.05 m/s    TV peak gradient 42 mmHg    TR Max Marcos 3.24 m/s    Ascending aorta 3.77 cm    STJ 2.39 cm    IVC diameter 1.99 cm    Sinus 3.35 cm    LA WIDTH 4.90 cm    RA Width 4.52 cm    TAPSE 1.37 cm    BSA  2.01 m2    LVOT stroke volume 39.9 cm3    LV Systolic Volume Index 91.2 mL/m2    LV Diastolic Volume Index 115.24 mL/m2    LVOT area 3.1 cm2    FS 9.1 (A) 28 - 44 %    Left Ventricle Relative Wall Thickness 0.36 cm    LV mass 347.9 g    LV Mass Index 176.6 g/m2    E/E' ratio 7.38 m/s    JEREL 81.7 mL/m2    LA Vol 160.92 cm3    RV/LV Ratio 1.00 cm    JEREL (MOD) 75.4 mL/m2    AV Velocity Ratio 0.64     AV index (prosthetic) 0.70     MAMADOU by Velocity Ratio 2.0 cm²    Triscuspid Valve Regurgitation Peak Gradient 42 mmHg    Mean e' 0.07 m/s    ZLVIDS 4.31     ZLVIDD 1.53     TV resting pulmonary artery pressure 57 mmHg    RV TB RVSP 18 mmHg    Est. RA pres 15 mmHg    Narrative      Left Ventricle: The left ventricle is severely dilated. Normal wall   thickness but prominent trabeculations are seen in the apex. There is   eccentric hypertrophy. Severe global hypokinesis present. There is   severely reduced systolic function with a visually estimated ejection   fraction of 15 - 20%. Grade II diastolic dysfunction.    Right Ventricle: Severe right ventricular enlargement. Wall thickness   is normal. Systolic function is moderately reduced. Pacemaker lead present   in the ventricle.    Severe biatrial enlargement    Aortic Valve: The aortic valve is a trileaflet valve. There is mild   aortic valve sclerosis. Mildly restricted motion.    Mitral Valve: There is moderate regurgitation.    Tricuspid Valve: There is mild to moderate regurgitation.    Pulmonary Artery: There is moderate pulmonary hypertension. The   estimated pulmonary artery systolic pressure is 57 mmHg.    IVC/SVC: Elevated venous pressure at 15 mmHg.    There is a small posterior pericardial effusion. Ascites is also   present.      and EKG:   Assessment and Plan:     Brief HPI: Chandan is a 59 y.o. female with severe combined CHF (EF 10-15%) with ICD in place, AFib and VTach, CKD 4, T2DM not on insulin, restrictive lung disease, MELISSA, anemia, chronic pain on  opioids, chronic prescription benzodiazepine use who presents from heart failure clinic for CHF exacerbation. She is on palliative dobutamine and not a candidate for advanced options.    * Acute on chronic combined systolic and diastolic heart failure  Acute on chronic systolic HF, NYHA class III, stage D. On home dobutamine 5 mcg/kg/min. Etiology: NICM.    Hemodynamic status: warm, normotensive, severely hypervolemic. Patient not in cardiogenic shock given above profile and negative lactate    Recommendations:    --Continue IV lasix 20 mg /hr if patient does not make a total of 1.5 L urine by noon then give 80 mg lasix IV bolus and up titrate the lasix drip to 30 mg /hr. Continue to monitor urine output.  -- Monitor electrolytes closely. Maintain Mg >2 and K >4, check BMP q8h today after lasix bolus and increase in lasix drip and thereafter check BMP q12h.  -- HF Pathway: Sodium restriction <2 g, Fluid restriction < 1500 mL, Strict I/Os, Daily weights         VTE Risk Mitigation (From admission, onward)           Ordered     apixaban tablet 5 mg  2 times daily         11/19/24 1214     Reason for No Pharmacological VTE Prophylaxis  Once        Question:  Reasons:  Answer:  Already adequately anticoagulated on oral Anticoagulants    11/18/24 1904     IP VTE HIGH RISK PATIENT  Once         11/18/24 1904     Place sequential compression device  Until discontinued         11/18/24 1904                    Mary Reyes MD  Cardiology  [facility]

## 2024-11-20 NOTE — MEDICAL/APP STUDENT
"CONSULTATION LIAISON PSYCHIATRY INITIAL EVALUATION    Patient Name: Hafsa Hawley  MRN: [id number]  Patient Class: IP- Inpatient  Admission Date: 11/18/2024  Attending Physician: Denny Elias DO      HPI:   Hafsa Hawley is a 59 y.o. female with past psychiatric history of depression and anxiety & past pertinent medical history of severe combined CHF with ICD in place, Afib and Vtach, CKD 4, T2DM,  presents to the ED/admitted to the hospital as potential heart transplant recipient.     Psychiatry consulted for depression    On psych exam, patient is laying in bed. She reports feeling "okay" today. She endorses a history of depression but states she has not felt sad lately. She understand her current health status and seems to be coping well. She states her health situation cannot be changed and seems to rely on her bernabe and God for comfort. She denies anxiety. She states she sometimes wakes up in the night gasping for breath due to her heart condition, and she says this sometimes makes her feel anxious. She reports sleeping about 4 hours a night due to discomfort in her legs and often wakes up shaking. She has not had her CPAP machine in quite a while, and she states she needs to buy a new one. She states she does not feel like she needs medication right now for sleep, depression, or anxiety. She states she does not like the feeling of sleeping too deeply. She became tearful when explaining she has accepted her current health situation. Patient denies SI/HI. Patient has history of SI, most recent documented being April 2024. Per chart review patient has a past suicide attempt about 14 years ago. Patient denies AVH. Patient is AAOx3. Supportive therapy was provided to the patient at bedside.      Medical Review of Systems:  Pertinent items are noted in HPI.    Psychiatric Review of Systems (is patient experiencing or having changes in):  sleep: yes  appetite: no  weight: no  energy/anergy: " "no  interest/pleasure/anhedonia: no  somatic symptoms: no  libido: no  anxiety/panic: yes  guilty/hopelessness: no  concentration: no  Preeti:no  Psychosis: no  Trauma: no  S.I.B.s/risky behavior: no    Past Psychiatric History:  Previous Medication Trials: yes- Zoloft  Previous Psychiatric Hospitalizations:yes  Previous Suicide Attempts: yes  History of Violence: no  Outpatient Psychiatrist: no  Family Psychiatric History: no    Substance Abuse History (with emphasis over the last 12 months):  Recreational Drugs:  denies  Use of Alcohol:  not currently  Tobacco Use: never  Rehab History:no    Social History:  Marital Status:   Children: 2  Employment Status/Info: on disability  :no  Education: high school diploma/GED  Special Ed: no  Housing Status: with family  Access to gun: no  Psychosocial Stressors: health  Functioning Relationships: good relationship with spouse or significant other    Legal History:  Past Charges/Incarcerations: no  Pending charges:no    Mental Status Exam:  General Appearance: appears stated age, well developed and nourished, adequately groomed and appropriately dressed, in no acute distress  Behavior: normal; cooperative; reasonably friendly, pleasant, and polite; appropriate eye-contact; under good behavioral control  Involuntary Movements and Motor Activity: no abnormal involuntary movements noted; no tics, no tremors, no akathisia, no dystonia, no evidence of tardive dyskinesia; no psychomotor agitation or retardation  Gait and Station: unable to assess - patient lying down or seated  Speech and Language: intact; normal rate, rhythm, volume, tone, and pitch; conversational, spontaneous, and coherent; speaks and understands English proficiently and fluently; repeats words and phrases, no word finding difficulties are noted  Mood: "okay"  Affect: dysthymic, tearful  Thought Process and Associations: intact; linear, goal-directed, organized, and logical; no loosening of " associations noted  Thought Content:: no suicidal or homicidal ideation, no auditory or visual hallucinations, no paranoid ideation, no ideas of reference, no evidence of delusions or psychosis  Perceptual Disturbances: denies hallucinations  Sensorium and Orientation: intact; alert with clear sensorium; oriented fully to person, place, time and situation  Recent and Remote Memory: grossly intact, able to recall relevant and salient information from the recent and remote past  Attention and Concentration: grossly intact, attentive to the conversation and not readily distractible  Fund of Knowledge: grossly intact, used appropriate vocabulary and demonstrated an awareness of current events, consistent with educational level achieved  Insight: intact, demonstrates awareness of illness and situation, good  Judgment: intact, behavior is adequate/appropriate to the circumstances, compliant with health provider's recommendations and instructions, good    CAM ICU positive? Did not assess      ASSESSMENT & RECOMMENDATIONS   Adjustment Disorder with Mixed Anxiety and Depressed Mood (F43.23)  Hx of depression and anxiety    Scheduled Medication(s):  Patient states she does not wish to start medications at  this time. She denies depression and anxiety, and she does not feel she needs medication to help her sleep.      Delirium Behavior Management  PLEASE utilize PRN meds first for agitation. Minimize use of PHYSICAL restraints OR have periods of being out of physical restraints if possible.  Keep window shades open and room lit during day and room dim at night in order to promote normal sleep-wake cycles  Encourage family at bedside. [place] patient often to situation, location, date.  Continue to Limit or Discontinue use of Narcotics, Benzos and Anti-cholinergic medications as they may worsen delirium.  Continue medical workup for causative etiology of Delirium.       Risk Assessment / Legal Status  Patient does not meet  criteria for PEC or involuntary inpatient psychiatric admission at this time. Recommend to rescind PEC if one was placed. Patient is not currently an imminent danger to self or others and is not gravely disabled due to a psychiatric illness.    Follow-up:  Will sign off. Final recommendations as stated above. Patient can follow-up with outpatient psychiatry. If the patient does not have an outpatient psychiatrist, resources for outpatient clinics will be provided in patient's discharge instructions.     Disposition:   Defer to primary team.    Please contact ON CALL psychiatry service (24/7) for any acute issues that may arise.    Beatriz Castaneda, Student Doctor  CL Psychiatry  [facility]  11/20/2024 8:23 AM        --------------------------------------------------------------------------------------------------------------------------------------------------------------------------------------------------------------------------------------    CONTINUED HISTORY & OBJECTIVE clinical data & findings reviewed and noted for above decision making    Past Medical/Surgical History:   Past Medical History:   Diagnosis Date    Allergy     Anemia     Arthritis     Atrial fibrillation     OAC    BMI 32.0-32.9,adult 02/22/2023    Chronic respiratory failure with hypoxia, on home oxygen therapy     2L with activity, off at rest.  Per Pulm  no overt evidence of ILD or COPD on PFTs and CT to explain O2 needs.    CKD (chronic kidney disease), stage IV 05/08/2018    Congestive heart failure     s/p AICD placement,    Deep vein thrombosis     Depression     elevated bilirubin d/t Gilbert's syndrome     confirmed by [place] genetic testing, evaluated by hepatology    Encounter for blood transfusion     GERD (gastroesophageal reflux disease)     Hypertension     Pheochromocytoma, malignant     Right kidney mass     Sleep apnea     Thalassemia trait, alpha     Thyroid disease     Type 2 diabetes mellitus with  hyperglycemia, without long-term current use of insulin 08/13/2020     Past Surgical History:   Procedure Laterality Date    ANGIOGRAM, ABDOMINAL AORTA Right 04/15/2024    APPENDECTOMY      BONE MARROW BIOPSY      CARDIAC DEFIBRILLATOR PLACEMENT Left 12/2016    CARDIAC ELECTROPHYSIOLOGY MAPPING AND ABLATION      CARDIAC ELECTROPHYSIOLOGY MAPPING AND ABLATION      COLONOSCOPY N/A 05/06/2022    Procedure: COLONOSCOPY;  Surgeon: Arely Betancourt MD;  Location: [facility] ENDO (2ND FLR);  Service: Endoscopy;  Laterality: N/A;  heart transplant candidate/ EF 25% - 2nd floor/ defib - Biotronik - ERW  Eliquis - per Dr. Cortez with [facility] [place], Pt ok to hold Eliquis x 2 days prior-see media tab-outside correspondence dated 12/30/21  - ER[facility]  verbal instructions/portal instructions/email instructions - s    EYE SURGERY      due to running tears    FRACTURE SURGERY Left     hand 5th digit    HYSTERECTOMY      KNEE SURGERY Left 2016    hematoma    LIVER BIOPSY  10/24/2018    Minimal steatosis, predominantly macrovesicular, 1%, Minimal nonspecific portal inflammation, no fibrosis. No findings on biopsy to explain elevated bilirubin levels. Could be d/t Gilbert's =?- hemolysis    RIGHT HEART CATHETERIZATION Right 12/07/2021    Procedure: INSERTION, CATHETER, RIGHT HEART;  Surgeon: Irving Cardenas MD;  Location: [facility] CATH LAB;  Service: Cardiology;  Laterality: Right;    RIGHT HEART CATHETERIZATION Right 12/19/2022    Procedure: INSERTION, CATHETER, RIGHT HEART;  Surgeon: Burke Camilo MD;  Location: [facility] CATH LAB;  Service: Cardiology;  Laterality: Right;    RIGHT HEART CATHETERIZATION Right 03/29/2023    Procedure: INSERTION, CATHETER, RIGHT HEART;  Surgeon: Katie Liriano DO;  Location: [facility] CATH LAB;  Service: Cardiology;  Laterality: Right;    TRANSJUGULAR BIOPSY OF LIVER N/A 10/24/2018    Procedure: BIOPSY, LIVER, TRANSJUGULAR APPROACH;  [facility];  [facility];  Service:  "Radiology;  Laterality: N/A;       Current Medications:   Scheduled Meds:    amiodarone  200 mg Oral Daily    apixaban  5 mg Oral BID    aspirin  81 mg Oral Daily    atorvastatin  40 mg Oral QHS    empagliflozin  25 mg Oral Daily    ferrous sulfate  1 tablet Oral Daily    isosorbide mononitrate  30 mg Oral Daily    mupirocin   Nasal BID    sevelamer carbonate  1,600 mg Oral TID WM    tiotropium bromide  2 puff Inhalation Daily     PRN Meds:   Current Facility-Administered Medications:     acetaminophen, 650 mg, Oral, Q8H PRN    acetaminophen, 650 mg, Oral, Q4H PRN    albuterol-ipratropium, 3 mL, Nebulization, Q6H PRN    ALPRAZolam, 0.5 mg, Oral, Daily PRN    aluminum-magnesium hydroxide-simethicone, 30 mL, Oral, QID PRN    benzonatate, 100 mg, Oral, TID PRN    glucagon (human recombinant), 1 mg, Intramuscular, PRN    glucose, 16 g, Oral, PRN    glucose, 24 g, Oral, PRN    guaiFENesin 100 mg/5 ml, 200 mg, Oral, Q4H PRN    HYDROcodone-acetaminophen, 1 tablet, Oral, Q6H PRN    melatonin, 6 mg, Oral, Nightly PRN    naloxone, 0.02 mg, Intravenous, PRN    ondansetron, 8 mg, Oral, Q8H PRN    polyethylene glycol, 17 g, Oral, Daily PRN    pregabalin, 50 mg, Oral, Nightly PRN    simethicone, 1 tablet, Oral, QID PRN    sodium chloride 0.9%, 1-10 mL, Intravenous, Q12H PRN    sodium chloride 0.9%, 10 mL, Intravenous, PRN    Allergies:   Review of patient's allergies indicates:   Allergen Reactions    Penicillins Hives and Other (See Comments)    Iodinated contrast media Nausea And Vomiting    Oxycodone-acetaminophen Other (See Comments)     Nausea, Dizziness, Anxiety.  "I don't like how it makes me feel."   Given Hydromorphone 0.5mg IVP  Without problems.  Other reaction(s): Other (See Comments)    Clonazepam Other (See Comments)    Diovan hct [valsartan-hydrochlorothiazide] Other (See Comments)     Causes coughing    Iodine Other (See Comments)    Irinotecan      Pt has homozygosity for the TA7 promoter variant that places this " individual at significantly increased risk for   severe neutropenia (grade 4) when treated with the standard dose of irinotecan (risk approximately 50%).   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 Metabolism of other drugs not listed here may also be impacted by UGT1A1 enzyme activity.       Tramadol Nausea And Vomiting and Other (See Comments)     Other reaction(s): Other (See Comments)    Valsartan Other (See Comments)       Vitals  Vitals:    11/20/24 0749   BP: 121/70   Pulse: 88   Resp: 18   Temp: 98 °F (36.7 °C)       Labs/Imaging/Studies:  Recent Results (from the past 24 hours)   Echo    Collection Time: 11/19/24  9:57 AM   Result Value Ref Range    LV Diastolic Volume 227.02 mL    Echo EF Estimated 21 %    LV Systolic Volume 179.58 mL    IVS 1.1 0.6 - 1.1 cm    LVIDd 6.6 (A) 3.5 - 6.0 cm    LVIDs 6.0 (A) 2.1 - 4.0 cm    LVOT diameter 2.0 cm    PW 1.2 (A) 0.6 - 1.1 cm    AV LVOT peak gradient 2 mmHg    LVOT mn grad 1 mmHg    LVOT peak marcos 0.7 m/s    LVOT peak VTI 12.7 cm    RV- carrillo basal diam 6.6 cm    RV S' 10.22 cm/s    LA size 5.50 cm    Left Atrium Major Axis 7.04 cm    Left Atrium Minor Axis 7.01 cm    LA Vol (MOD) 148.46 mL    RA Major Axis 6.92 cm    RA Area 32.2 cm2    AV valve area 2.2 cm2    AV area by cont VTI 2.1 cm2    AV peak gradient 4.8 mmHg    AV mean gradient 2.3 mmHg    Ao peak marcos 1.1 m/s    Ao VTI 18.2 cm    MV Peak A Marcos 0.27 m/s    E wave deceleration time 185.89 ms    MV Peak E Marcos 0.48 m/s    E/A ratio 1.78     LV LATERAL E/E' RATIO 6.00     LV SEPTAL E/E' RATIO 9.60     TDI LATERAL 0.08 m/s    TDI SEPTAL 0.05 m/s    TV peak gradient 42 mmHg    TR Max Marcos 3.24 m/s    Ascending aorta 3.77 cm    STJ 2.39 cm    IVC diameter 1.99 cm    Sinus 3.35 cm    LA WIDTH 4.90 cm    RA Width 4.52 cm    TAPSE 1.37 cm    BSA 2.01 m2    LVOT stroke volume 39.9 cm3    LV Systolic  Volume Index 91.2 mL/m2    LV Diastolic Volume Index 115.24 mL/m2    LVOT area 3.1 cm2    FS 9.1 (A) 28 - 44 %    Left Ventricle Relative Wall Thickness 0.36 cm    LV mass 347.9 g    LV Mass Index 176.6 g/m2    E/E' ratio 7.38 m/s    JREEL 81.7 mL/m2    LA Vol 160.92 cm3    RV/LV Ratio 1.00 cm    JEREL (MOD) 75.4 mL/m2    AV Velocity Ratio 0.64     AV index (prosthetic) 0.70     MAMADOU by Velocity Ratio 2.0 cm²    Triscuspid Valve Regurgitation Peak Gradient 42 mmHg    Mean e' 0.07 m/s    ZLVIDS 4.31     ZLVIDD 1.53     TV resting pulmonary artery pressure 57 mmHg    RV TB RVSP 18 mmHg    Est. RA pres 15 mmHg   Blood culture    Collection Time: 11/19/24 11:33 AM    Specimen: Peripheral, Hand, Right; Blood   Result Value Ref Range    Blood Culture, Routine No Growth to date    Blood culture    Collection Time: 11/19/24 11:33 AM    Specimen: Blood   Result Value Ref Range    Blood Culture, Routine No Growth to date    POCT glucose    Collection Time: 11/19/24  7:53 PM   Result Value Ref Range    POCT Glucose 84 70 - 110 mg/dL   Phosphorus    Collection Time: 11/20/24  5:44 AM   Result Value Ref Range    Phosphorus 5.2 (H) 2.7 - 4.5 mg/dL   Magnesium    Collection Time: 11/20/24  5:44 AM   Result Value Ref Range    Magnesium 2.3 1.6 - 2.6 mg/dL   Basic Metabolic Panel    Collection Time: 11/20/24  5:44 AM   Result Value Ref Range    Sodium 141 136 - 145 mmol/L    Potassium 4.4 3.5 - 5.1 mmol/L    Chloride 104 95 - 110 mmol/L    CO2 25 23 - 29 mmol/L    Glucose 74 70 - 110 mg/dL    BUN 96 (H) 6 - 20 mg/dL    Creatinine 3.1 (H) 0.5 - 1.4 mg/dL    Calcium 9.3 8.7 - 10.5 mg/dL    Anion Gap 12 8 - 16 mmol/L    eGFR 16.7 (A) >60 mL/min/1.73 m^2   CBC Auto Differential    Collection Time: 11/20/24  5:45 AM   Result Value Ref Range    WBC 4.31 3.90 - 12.70 K/uL    RBC 4.97 4.00 - 5.40 M/uL    Hemoglobin 9.4 (L) 12.0 - 16.0 g/dL    Hematocrit 33.2 (L) 37.0 - 48.5 %    MCV 67 (L) 82 - 98 fL    MCH 18.9 (L) 27.0 - 31.0 pg    MCHC 28.3  (L) 32.0 - 36.0 g/dL    RDW 35.0 (H) 11.5 - 14.5 %    Platelets 133 (L) 150 - 450 K/uL    MPV SEE COMMENT 9.2 - 12.9 fL    Immature Granulocytes 0.7 (H) 0.0 - 0.5 %    Gran # (ANC) 2.8 1.8 - 7.7 K/uL    Immature Grans (Abs) 0.03 0.00 - 0.04 K/uL    Lymph # 0.8 (L) 1.0 - 4.8 K/uL    Mono # 0.6 0.3 - 1.0 K/uL    Eos # 0.1 0.0 - 0.5 K/uL    Baso # 0.03 0.00 - 0.20 K/uL    nRBC 18 (A) 0 /100 WBC    Gran % 64.0 38.0 - 73.0 %    Lymph % 18.6 18.0 - 48.0 %    Mono % 14.8 4.0 - 15.0 %    Eosinophil % 1.2 0.0 - 8.0 %    Basophil % 0.7 0.0 - 1.9 %    Platelet Estimate Decreased (A)     Aniso Marked     Poik Moderate     Poly Moderate     Hypo Occasional     Ovalocytes Occasional     Target Cells Occasional     Spherocytes Moderate     Schistocytes Present     [place] Bodies Occasional     Fragmented Cells Occasional     Differential Method Automated

## 2024-11-20 NOTE — ASSESSMENT & PLAN NOTE
-Previously on zoloft, pt apparently self-discontinued  -Will involve psychiatry team at this time

## 2024-11-20 NOTE — CONSULTS
"CONSULTATION LIAISON PSYCHIATRY INITIAL EVALUATION     Patient Name: Hafsa Hawley  MRN: [id number]  Patient Class: IP- Inpatient  Admission Date: 11/18/2024  Attending Physician: Denny Elias DO        HPI:   Hafsa Hawley is a 59 y.o. female with past psychiatric history of depression and anxiety & past pertinent medical history of severe combined CHF with ICD in place, Afib and Vtach, CKD 4, T2DM,  presents to the ED/admitted to the hospital as potential heart transplant recipient.      Psychiatry consulted for depression     On psych exam, patient is laying in bed. She reports feeling "okay" today. She endorses a history of depression but states she has not felt sad lately. She understand her current health status and seems to be coping well. She states her health situation cannot be changed and seems to rely on her bernabe and God for comfort. She denies anxiety. She states she sometimes wakes up in the night gasping for breath due to her heart condition, and she says this sometimes makes her feel anxious. She reports sleeping about 4 hours a night due to discomfort in her legs and often wakes up shaking. She has not had her CPAP machine in quite a while, and she states she needs to buy a new one. She states she does not feel like she needs medication right now for sleep, depression, or anxiety. She states she does not like the feeling of sleeping too deeply. She became tearful when explaining she has accepted her current health situation. Patient denies SI/HI. Patient has history of SI, most recent documented being April 2024. Per chart review patient has a past suicide attempt about 14 years ago. Patient denies AVH. Patient is AAOx3. Supportive therapy was provided to the patient at bedside.        Medical Review of Systems:  Pertinent items are noted in HPI.     Psychiatric Review of Systems (is patient experiencing or having changes in):  sleep: yes  appetite: no  weight: no  energy/anergy: " "no  interest/pleasure/anhedonia: no  somatic symptoms: no  libido: no  anxiety/panic: yes  guilty/hopelessness: no  concentration: no  Preeti:no  Psychosis: no  Trauma: no  S.I.B.s/risky behavior: no     Past Psychiatric History:  Previous Medication Trials: yes- Zoloft  Previous Psychiatric Hospitalizations:yes  Previous Suicide Attempts: yes  History of Violence: no  Outpatient Psychiatrist: no  Family Psychiatric History: no     Substance Abuse History (with emphasis over the last 12 months):  Recreational Drugs:  denies  Use of Alcohol:  not currently  Tobacco Use: never  Rehab History:no     Social History:  Marital Status:   Children: 2  Employment Status/Info: on disability  :no  Education: high school diploma/GED  Special Ed: no  Housing Status: with family  Access to gun: no  Psychosocial Stressors: health  Functioning Relationships: good relationship with spouse or significant other     Legal History:  Past Charges/Incarcerations: no  Pending charges:no     Mental Status Exam:  General Appearance: appears stated age, well developed and nourished, adequately groomed and appropriately dressed, in no acute distress  Behavior: normal; cooperative; reasonably friendly, pleasant, and polite; appropriate eye-contact; under good behavioral control  Involuntary Movements and Motor Activity: no abnormal involuntary movements noted; no tics, no tremors, no akathisia, no dystonia, no evidence of tardive dyskinesia; no psychomotor agitation or retardation  Gait and Station: unable to assess - patient lying down or seated  Speech and Language: intact; normal rate, rhythm, volume, tone, and pitch; conversational, spontaneous, and coherent; speaks and understands English proficiently and fluently; repeats words and phrases, no word finding difficulties are noted  Mood: "okay"  Affect: dysthymic, tearful  Thought Process and Associations: intact; linear, goal-directed, organized, and logical; no loosening of " associations noted  Thought Content:: no suicidal or homicidal ideation, no auditory or visual hallucinations, no paranoid ideation, no ideas of reference, no evidence of delusions or psychosis  Perceptual Disturbances: denies hallucinations  Sensorium and Orientation: intact; alert with clear sensorium; oriented fully to person, place, time and situation  Recent and Remote Memory: grossly intact, able to recall relevant and salient information from the recent and remote past  Attention and Concentration: grossly intact, attentive to the conversation and not readily distractible  Fund of Knowledge: grossly intact, used appropriate vocabulary and demonstrated an awareness of current events, consistent with educational level achieved  Insight: intact, demonstrates awareness of illness and situation, good  Judgment: intact, behavior is adequate/appropriate to the circumstances, compliant with health provider's recommendations and instructions, good     CAM ICU positive? Did not assess        ASSESSMENT & RECOMMENDATIONS   Adjustment Disorder with Mixed Anxiety and Depressed Mood (F43.23)  Hx of UMD     Scheduled Medication(s):  Patient states she does not wish to start medications at  this time. She denies depression and anxiety, and she does not feel she needs medication to help her sleep.        Delirium Behavior Management  PLEASE utilize PRN meds first for agitation. Minimize use of PHYSICAL restraints OR have periods of being out of physical restraints if possible.  Keep window shades open and room lit during day and room dim at night in order to promote normal sleep-wake cycles  Encourage family at bedside. [place] patient often to situation, location, date.  Continue to Limit or Discontinue use of Narcotics, Benzos and Anti-cholinergic medications as they may worsen delirium.  Continue medical workup for causative etiology of Delirium.         Risk Assessment / Legal Status  Patient does not meet criteria for PEC  or involuntary inpatient psychiatric admission at this time. Recommend to rescind PEC if one was placed. Patient is not currently an imminent danger to self or others and is not gravely disabled due to a psychiatric illness.     Follow-up:  Will sign off. Final recommendations as stated above. Patient can follow-up with outpatient psychiatry. If the patient does not have an outpatient psychiatrist, resources for outpatient clinics will be provided in patient's discharge instructions.      Disposition:   Defer to primary team.     Please contact ON CALL psychiatry service (24/7) for any acute issues that may arise.     Beatriz Castaneda, Student Doctor  Patient interviewed and examined by myself and medical student. Note originally drafted by medical student and submitted with my own revisions and additions. Note as submitted is true to my independent evaluation, examination, and plan for this patient.    William Sistrunk, MD PGY-2   CL Psychiatry  [facility]  11/20/2024 8:23 AM

## 2024-11-20 NOTE — ASSESSMENT & PLAN NOTE
Renal function remains around baseline; Estimated Creatinine Clearance: 21.6 mL/min (A) (based on SCr of 3.1 mg/dL (H)). according to latest data. Based on current GFR, CKD stage is stage 4 - GFR 15-29. Will avoid nephrotoxic agents as able, and renally dose all meds as applicable.    -Cr high but still below baseline  -Monitor closely during diuresis

## 2024-11-20 NOTE — SUBJECTIVE & OBJECTIVE
Interval History: patient was initially started on lasix 10mg/hr later switched to 20 mg /hr yesterday. Since yesterday she has been net negative 1600. Palliative has seen her and patient seem reluctant to talk about goals of care discussion. Currently full code.    ROS  Objective:     Vital Signs (Most Recent):  Temp: 98.2 °F (36.8 °C) (11/20/24 1100)  Pulse: 92 (11/20/24 1113)  Resp: 20 (11/20/24 1100)  BP: 110/66 (11/20/24 1100)  SpO2: 96 % (11/20/24 1100) Vital Signs (24h Range):  Temp:  [97.4 °F (36.3 °C)-98.2 °F (36.8 °C)] 98.2 °F (36.8 °C)  Pulse:  [81-97] 92  Resp:  [14-20] 20  SpO2:  [96 %-100 %] 96 %  BP: (106-126)/(59-80) 110/66     Weight: 86.2 kg (190 lb 0.6 oz)  Body mass index is 30.67 kg/m².     SpO2: 96 %         Intake/Output Summary (Last 24 hours) at 11/20/2024 1129  Last data filed at 11/20/2024 0936  Gross per 24 hour   Intake 1267.65 ml   Output 2050 ml   Net -782.35 ml       Lines/Drains/Airways       Peripherally Inserted Central Catheter Line  Duration             PICC Double Lumen 05/12/23 1534 right brachial 557 days              Drain  Duration                  Urethral Catheter 11/19/24 1754 Straight-tip 16 Fr. <1 day                       Physical Exam     Constitutional:       Appearance: Normal appearance. She is obese.   Eyes:      Pupils: Pupils are equal, round, and reactive to light.   Neck:      Comments: JVD present   Cardiovascular:      Rate and Rhythm: Normal rate and regular rhythm.      Pulses: Normal pulses.      Heart sounds: Murmur heard.   Pulmonary:      Effort: Pulmonary effort is normal.      Breath sounds: Rales present.   Abdominal:      General: Abdomen is flat.      Palpations: Abdomen is soft.   Musculoskeletal:      Cervical back: Normal range of motion.      Right lower leg: Edema present.      Left lower leg: Edema present.   Neurological:      General: No focal deficit present.      Mental Status: She is alert and oriented to person, place, and time.    Psychiatric:         Mood and Affect: Mood normal.         Behavior: Behavior normal.     Significant Labs: All pertinent lab results from the last 24 hours have been reviewed.    Significant Imaging: Echocardiogram: Transthoracic echo (TTE) complete (Cupid Only):   Results for orders placed or performed during the hospital encounter of 11/18/24   Echo   Result Value Ref Range    LV Diastolic Volume 227.02 mL    Echo EF Estimated 21 %    LV Systolic Volume 179.58 mL    IVS 1.1 0.6 - 1.1 cm    LVIDd 6.6 (A) 3.5 - 6.0 cm    LVIDs 6.0 (A) 2.1 - 4.0 cm    LVOT diameter 2.0 cm    PW 1.2 (A) 0.6 - 1.1 cm    AV LVOT peak gradient 2 mmHg    LVOT mn grad 1 mmHg    LVOT peak marcos 0.7 m/s    LVOT peak VTI 12.7 cm    RV- carrillo basal diam 6.6 cm    RV S' 10.22 cm/s    LA size 5.50 cm    Left Atrium Major Axis 7.04 cm    Left Atrium Minor Axis 7.01 cm    LA Vol (MOD) 148.46 mL    RA Major Axis 6.92 cm    RA Area 32.2 cm2    AV valve area 2.2 cm2    AV area by cont VTI 2.1 cm2    AV peak gradient 4.8 mmHg    AV mean gradient 2.3 mmHg    Ao peak marcos 1.1 m/s    Ao VTI 18.2 cm    MV Peak A Marcos 0.27 m/s    E wave deceleration time 185.89 ms    MV Peak E Marcos 0.48 m/s    E/A ratio 1.78     LV LATERAL E/E' RATIO 6.00     LV SEPTAL E/E' RATIO 9.60     TDI LATERAL 0.08 m/s    TDI SEPTAL 0.05 m/s    TV peak gradient 42 mmHg    TR Max Marcos 3.24 m/s    Ascending aorta 3.77 cm    STJ 2.39 cm    IVC diameter 1.99 cm    Sinus 3.35 cm    LA WIDTH 4.90 cm    RA Width 4.52 cm    TAPSE 1.37 cm    BSA 2.01 m2    LVOT stroke volume 39.9 cm3    LV Systolic Volume Index 91.2 mL/m2    LV Diastolic Volume Index 115.24 mL/m2    LVOT area 3.1 cm2    FS 9.1 (A) 28 - 44 %    Left Ventricle Relative Wall Thickness 0.36 cm    LV mass 347.9 g    LV Mass Index 176.6 g/m2    E/E' ratio 7.38 m/s    JEREL 81.7 mL/m2    LA Vol 160.92 cm3    RV/LV Ratio 1.00 cm    JEREL (MOD) 75.4 mL/m2    AV Velocity Ratio 0.64     AV index (prosthetic) 0.70     MAMADOU by Velocity Ratio  2.0 cm²    Triscuspid Valve Regurgitation Peak Gradient 42 mmHg    Mean e' 0.07 m/s    ZLVIDS 4.31     ZLVIDD 1.53     TV resting pulmonary artery pressure 57 mmHg    RV TB RVSP 18 mmHg    Est. RA pres 15 mmHg    Narrative      Left Ventricle: The left ventricle is severely dilated. Normal wall   thickness but prominent trabeculations are seen in the apex. There is   eccentric hypertrophy. Severe global hypokinesis present. There is   severely reduced systolic function with a visually estimated ejection   fraction of 15 - 20%. Grade II diastolic dysfunction.    Right Ventricle: Severe right ventricular enlargement. Wall thickness   is normal. Systolic function is moderately reduced. Pacemaker lead present   in the ventricle.    Severe biatrial enlargement    Aortic Valve: The aortic valve is a trileaflet valve. There is mild   aortic valve sclerosis. Mildly restricted motion.    Mitral Valve: There is moderate regurgitation.    Tricuspid Valve: There is mild to moderate regurgitation.    Pulmonary Artery: There is moderate pulmonary hypertension. The   estimated pulmonary artery systolic pressure is 57 mmHg.    IVC/SVC: Elevated venous pressure at 15 mmHg.    There is a small posterior pericardial effusion. Ascites is also   present.      and EKG:

## 2024-11-20 NOTE — PROGRESS NOTES
[facility]  Progress Note    Patient Name: Hafsa Hawley  MRN: [id number]  Patient Class: IP- Inpatient   Admission Date: 11/18/2024  Length of Stay: 2 days  Attending Physician: Denny Elias DO  Primary Care Provider: Cristobal Ann MD        Subjective:     Principal Problem:Acute on chronic combined systolic and diastolic heart failure        HPI:  Hafsa Hawley is a 59 y.o. female with severe combined CHF (EF 10-15%) with ICD in place, AFib and VTach, CKD 4, T2DM not on insulin, restrictive lung disease, MELISSA, anemia, chronic pain on opioids, chronic prescription benzodiazepine use who presents from heart failure clinic for CHF exacerbation.     She reports that over the past few days, she has had an increase in her chronic shortness of breath particularly with exertion. She also notes lower extremity edema, which she usually doesn't get unless she is in severe CHF exacerbation. Has associated cough with chest pressure, which increases with cough.     She ran out of her Bumex due to the pharmacy not having it approximately one week ago. She has tried taking her Metolazone instead, which she usually takes 2-3 times per week PRN, but has been taking daily but has had persistent dyspnea and swelling despite this.     She presented to Cardiology clinic today, who recommended she be admitted for diuresis.     Overview/Hospital Course:  Referred from [facility] clinic due to concern for decompensated CHF. Not taking bumex x10 days. Cardiology consulted on arrival.  Started on IVP diuresis with minimal UOP. S/p lasix 80mg IVP x2 with lasix ggt, titrating as indicated.  Given lack of candidacy for transplant, pt reportedly went to [facility] for second opinion and was again denied for advanced options. Palliative care team now on board to discuss goals of care given poor prognosis and lack of options.  Additionally consulted Psychiatry given depression like symptoms while pt  navigates through GOC conversations.    Interval History: Seen this AM at bedside.  No acute events reported overnight.  Dyspnea improved along with lower extremity swelling.  UOP improving.  Denies chest pain    Review of Systems  Objective:     Vital Signs (Most Recent):  Temp: 97.5 °F (36.4 °C) (11/19/24 1136)  Pulse: 84 (11/19/24 1136)  Resp: 19 (11/19/24 1136)  BP: 114/62 (11/19/24 1136)  SpO2: 98 % (11/19/24 1136) Vital Signs (24h Range):  Temp:  [97.4 °F (36.3 °C)-98.3 °F (36.8 °C)] 97.5 °F (36.4 °C)  Pulse:  [77-89] 84  Resp:  [16-19] 19  SpO2:  [93 %-99 %] 98 %  BP: ()/(57-79) 114/62     Weight: 87.2 kg (192 lb 3.9 oz)  Body mass index is 31.03 kg/m².    Intake/Output Summary (Last 24 hours) at 11/19/2024 1218  Last data filed at 11/19/2024 0944  Gross per 24 hour   Intake 240 ml   Output 200 ml   Net 40 ml         Physical Exam  Vitals and nursing note reviewed.   Constitutional:       General: She is not in acute distress.     Appearance: She is well-developed. She is ill-appearing (chronically). She is not diaphoretic.   HENT:      Head: Normocephalic and atraumatic.      Mouth/Throat:      Mouth: Mucous membranes are moist.   Eyes:      Pupils: Pupils are equal, round, and reactive to light.   Neck:      Vascular: No JVD.   Cardiovascular:      Rate and Rhythm: Normal rate. Rhythm irregular.      Heart sounds: Murmur heard.   Pulmonary:      Effort: Pulmonary effort is normal. No respiratory distress.   Abdominal:      General: There is distension.      Tenderness: There is no abdominal tenderness.   Musculoskeletal:      Right lower leg: Edema present.      Left lower leg: Edema present.      Comments: Pitting edema to BL LEs   Skin:     Coloration: Skin is not jaundiced or pale.   Neurological:      General: No focal deficit present.      Mental Status: She is alert and oriented to person, place, and time.      Motor: No abnormal muscle tone.   Psychiatric:         Mood and Affect: Mood normal.          Behavior: Behavior normal.         Thought Content: Thought content normal.             Significant Labs: All pertinent labs within the past 24 hours have been reviewed.    Significant Imaging: I have reviewed all pertinent imaging results/findings within the past 24 hours.    Assessment/Plan:      * Acute on chronic combined systolic and diastolic heart failure  -Patient with known severe CHF with last EF 10-15% and Grade 2 DD on TTE 5/2024, on chronic dobutamine gtt. Followed by advanced HF clinic, and has been considered for transplant however declined by 2 facilities. Presents with signs of volume overload on exam and worsening dyspnes, consistent with CHF exacerbation.   -TTE redemonstrating reduced EF although slighlty higher 15-20%. CVP 15mmHg  Plan:  -cardiology on board, appreciate assistance  -continue Lasix ggt, titrate as indicated  -Monitor UOP closely  - Will continue home dobutamine as well. Continue GDMT as tolerated, although limited chronically due to advanced CKD and softer BPs.  -Will obtain daily weights and monitor renal function and electrolytes on serial labs. Strict intake and output along with fluid restricted diet. Monitor on telemetry.     Goals of care, counseling/discussion  Advance Care Planning    [facility]  I engaged the patient in a voluntary conversation about advance care planning and we specifically addressed what the goals of care would be moving forward, in light of the patient's change in clinical status, specifically advanced CHF.  We did specifically address the patient's likely prognosis, which is poor.  We explored the patient's values and preferences for future care.  The patient endorses that what is most important right now is to focus on  TBD, pending further discussions with palliative care team    Accordingly, we have decided that the best plan to meet the patient's goals includes  TBD    A total of 10 min was spent on advance care planning, goals of care  discussion, emotional support, formulating and communicating prognosis and exploring burden/benefit of various approaches of treatment. This discussion occurred on a fully voluntary basis with the verbal consent of the patient and/or family.          Chronic pain with opioid use  Continue home regimen as verified by PDMP.      Type 2 diabetes mellitus without complication, without long-term current use of insulin  Recent A1c 5.7, well-controlled likely due to advancing CKD. Continue home Jardiance for CHF. No indication for insulin at this time, as to avoid hypoglycemia in advanced CKD.      Hyperlipidemia associated with type 2 diabetes mellitus  Continue hospital formulary of home statin.       Anemia of chronic disease  Stable, and around baseline without indication for transfusion. Monitor.     Paroxysmal A-fib  Patient has persistent (7 days or more) atrial fibrillation. [id number] Score: 3. The patients heart rate in the last 24 hours is as follows:  Pulse  Min: 85  Max: 89     Antiarrhythmics  amiodarone tablet 200 mg, Daily, Oral  metoprolol succinate (TOPROL-XL) 24 hr split tablet 12.5 mg, Daily, Oral    Anticoagulants  apixaban tablet 2.5 mg, 2 times daily, Oral    Plan  - Replete lytes with a goal of K>4, Mg >2  - Patient is anticoagulated, see medications listed above.  - Patient's afib is currently controlled  - Continue home meds.     Pulmonary HTN  Continue diuresis with goal of euvolemia.      CKD (chronic kidney disease), stage IV  Renal function remains around baseline; Estimated Creatinine Clearance: 21.6 mL/min (A) (based on SCr of 3.1 mg/dL (H)). according to latest data. Based on current GFR, CKD stage is stage 4 - GFR 15-29. Will avoid nephrotoxic agents as able, and renally dose all meds as applicable.    -Cr high but still below baseline  -Monitor closely during diuresis    Chronic prescription benzodiazepine use  Verified by PDMP. Continue reduced dose of home regimen as to not precipitate  withdrawal, however cautiously as this is likely not an optimal medication for this patient with multiple advanced comorbidities and chronic respiratory failure.       Chronic respiratory failure with hypoxia  Reports using home oxygen, likely with chronic intermittent respiratory failure due to restrictive lung disease on PFTs and advanced CHF. Wean oxygen to maintain sats 90% or greater.     Paroxysmal supraventricular tachycardia  Continue Amiodarone (started during recent hospitalization in [place]). Monitor on telemetry.       Depression due to physical illness  -Previously on zoloft, pt apparently self-discontinued  -Will involve psychiatry team at this time        MELISSA (obstructive sleep apnea)  Non-compliant with CPAP. Monitor.       Essential hypertension  Chronic, and currently controlled. Latest blood pressure and vitals reviewed-   While in the hospital, will manage blood pressure as follows; Continue home antihypertensive regimen along with diuresis.    Will utilize p.r.n. blood pressure medication only if patient's blood pressure greater than 180/110 and she develops symptoms such as worsening chest pain or shortness of breath.      VTE Risk Mitigation (From admission, onward)           Ordered     apixaban tablet 5 mg  2 times daily         11/19/24 1214     Reason for No Pharmacological VTE Prophylaxis  Once        Question:  Reasons:  Answer:  Already adequately anticoagulated on oral Anticoagulants    11/18/24 1904     IP VTE HIGH RISK PATIENT  Once         11/18/24 1904     Place sequential compression device  Until discontinued         11/18/24 1904                    Discharge Planning   CHIQUI: 11/22/2024     Code Status: Full Code   Is the patient medically ready for discharge?:     Reason for patient still in hospital (select all that apply): Patient trending condition  Discharge Plan A: Home with family, Other (home infusion)                  Denny Elias DO  Department of Hospital Medicine    [facility] - [facility]iology Step[facility]

## 2024-11-20 NOTE — NURSING
Patient complaining of burning sensation and feels like there's pressure like when she have  urinate . Explained to the patient that she has a catheter that she should not be feeling like that.Tried to deflate and inflate the balloon and doing sterile water irrigation with slight relieve but eventually still back to hurting. Patient did asked to pull the catheter out cause she was in a lot of pressure pain. Dr. Hernandes attending notified for all these. Barton currently discontinue per patient's request. Awaiting to void for urinalysis.

## 2024-11-20 NOTE — CHAPLAIN
Patient: Hafsa Hawley  MRN: [id number]  : 1965  Age: 59 y.o.  Legal sex: female   Hospital Length of Stay: 2 days  Code Status: Full Code   Attending Provider: Denny Elias DO  Principal Problem: Acute on chronic combined systolic and diastolic heart failure  Patient's Baptism: Gnosticism  Length of my visit: 15 min     Purpose of visit:   palliative care     The patient was sitting up in her chair, having some lunch. She appeared more withdrawn, reluctant to talk. She mentioned 'feeling a little bit better', and 'hoping to go home soon'.    The  assessed withdrawing as coping, and offered prayer. No further needs were expressed today.         Feliciano Leija, [phone]  Resident , Rev.       support is available and on-site . Please call the on-call  for any emergent spiritual care needs, [phone].

## 2024-11-20 NOTE — PLAN OF CARE
Problem: Adult Inpatient Plan of Care  Goal: Plan of Care Review  Outcome: Progressing  Goal: Patient-Specific Goal (Individualized)  Outcome: Progressing  Goal: Absence of Hospital-Acquired Illness or Injury  Outcome: Progressing  Goal: Optimal Comfort and Wellbeing  Outcome: Progressing  Goal: Readiness for Transition of Care  Outcome: Progressing     Problem: Diabetes Comorbidity  Goal: Blood Glucose Level Within Targeted Range  Outcome: Progressing     Problem: Coping Ineffective  Goal: Effective Coping  Outcome: Progressing     Problem: Infection  Goal: Absence of Infection Signs and Symptoms  Outcome: Progressing     Problem: Heart Failure  Goal: Optimal Coping  Outcome: Progressing  Goal: Optimal Cardiac Output  Outcome: Progressing  Goal: Stable Heart Rate and Rhythm  Outcome: Progressing  Goal: Optimal Functional Ability  Outcome: Progressing  Goal: Fluid and Electrolyte Balance  Outcome: Progressing  Goal: Improved Oral Intake  Outcome: Progressing  Goal: Effective Oxygenation and Ventilation  Outcome: Progressing  Goal: Effective Breathing Pattern During Sleep  Outcome: Progressing     Problem: Skin Injury Risk Increased  Goal: Skin Health and Integrity  Outcome: Progressing

## 2024-11-20 NOTE — ASSESSMENT & PLAN NOTE
Acute on chronic systolic HF, NYHA class III, stage D. On home dobutamine 5 mcg/kg/min. Etiology: NICM.  Hemodynamic status: warm, normotensive, severely hypervolemic.  Plan:  Patient not in cardiogenic shock given above profile and negative lactate      --Continue IV lasix 20 mg /hr if patient does not make a total of 1.5 L urine by noon then give 80 mg lasix IV bolus and up titrate the lasix drip to 30 mg /hr. Continue to monitor urine output.  -- Monitor electrolytes closely. Maintain Mg >2 and K >4, check BMP q8h today after lasix bolus and increase in lasix drip and thereafter check BMP q12h.  -- HF Pathway: Sodium restriction <2 g, Fluid restriction < 1500 mL, Strict I/Os, Daily weights

## 2024-11-21 LAB
ANION GAP SERPL CALC-SCNC: 13 MMOL/L (ref 8–16)
ANION GAP SERPL CALC-SCNC: 13 MMOL/L (ref 8–16)
BASOPHILS # BLD AUTO: 0.04 K/UL (ref 0–0.2)
BASOPHILS NFR BLD: 0.9 % (ref 0–1.9)
BUN SERPL-MCNC: 96 MG/DL (ref 6–20)
BUN SERPL-MCNC: 96 MG/DL (ref 6–20)
CALCIUM SERPL-MCNC: 9.3 MG/DL (ref 8.7–10.5)
CALCIUM SERPL-MCNC: 9.5 MG/DL (ref 8.7–10.5)
CHLORIDE SERPL-SCNC: 102 MMOL/L (ref 95–110)
CHLORIDE SERPL-SCNC: 102 MMOL/L (ref 95–110)
CO2 SERPL-SCNC: 24 MMOL/L (ref 23–29)
CO2 SERPL-SCNC: 24 MMOL/L (ref 23–29)
CREAT SERPL-MCNC: 3.1 MG/DL (ref 0.5–1.4)
CREAT SERPL-MCNC: 3.4 MG/DL (ref 0.5–1.4)
DIFFERENTIAL METHOD BLD: ABNORMAL
EOSINOPHIL # BLD AUTO: 0.1 K/UL (ref 0–0.5)
EOSINOPHIL NFR BLD: 2 % (ref 0–8)
ERYTHROCYTE [DISTWIDTH] IN BLOOD BY AUTOMATED COUNT: 34.4 % (ref 11.5–14.5)
EST. GFR  (NO RACE VARIABLE): 14.9 ML/MIN/1.73 M^2
EST. GFR  (NO RACE VARIABLE): 16.7 ML/MIN/1.73 M^2
GLUCOSE SERPL-MCNC: 137 MG/DL (ref 70–110)
GLUCOSE SERPL-MCNC: 76 MG/DL (ref 70–110)
HCT VFR BLD AUTO: 33.3 % (ref 37–48.5)
HGB BLD-MCNC: 9.5 G/DL (ref 12–16)
IMM GRANULOCYTES # BLD AUTO: 0.03 K/UL (ref 0–0.04)
IMM GRANULOCYTES NFR BLD AUTO: 0.7 % (ref 0–0.5)
LYMPHOCYTES # BLD AUTO: 0.8 K/UL (ref 1–4.8)
LYMPHOCYTES NFR BLD: 17.4 % (ref 18–48)
MAGNESIUM SERPL-MCNC: 2.4 MG/DL (ref 1.6–2.6)
MCH RBC QN AUTO: 18.7 PG (ref 27–31)
MCHC RBC AUTO-ENTMCNC: 28.5 G/DL (ref 32–36)
MCV RBC AUTO: 66 FL (ref 82–98)
MONOCYTES # BLD AUTO: 0.6 K/UL (ref 0.3–1)
MONOCYTES NFR BLD: 13.9 % (ref 4–15)
NEUTROPHILS # BLD AUTO: 3 K/UL (ref 1.8–7.7)
NEUTROPHILS NFR BLD: 65.1 % (ref 38–73)
NRBC BLD-RTO: 14 /100 WBC
PHOSPHATE SERPL-MCNC: 5.7 MG/DL (ref 2.7–4.5)
PLATELET # BLD AUTO: 123 K/UL (ref 150–450)
PMV BLD AUTO: ABNORMAL FL (ref 9.2–12.9)
POTASSIUM SERPL-SCNC: 4.5 MMOL/L (ref 3.5–5.1)
POTASSIUM SERPL-SCNC: 4.8 MMOL/L (ref 3.5–5.1)
RBC # BLD AUTO: 5.08 M/UL (ref 4–5.4)
SODIUM SERPL-SCNC: 139 MMOL/L (ref 136–145)
SODIUM SERPL-SCNC: 139 MMOL/L (ref 136–145)
WBC # BLD AUTO: 4.6 K/UL (ref 3.9–12.7)

## 2024-11-21 PROCEDURE — 20600001 HC STEP DOWN PRIVATE ROOM

## 2024-11-21 PROCEDURE — 63600175 PHARM REV CODE 636 W HCPCS: Performed by: STUDENT IN AN ORGANIZED HEALTH CARE EDUCATION/TRAINING PROGRAM

## 2024-11-21 PROCEDURE — 94640 AIRWAY INHALATION TREATMENT: CPT

## 2024-11-21 PROCEDURE — 25000003 PHARM REV CODE 250: Performed by: STUDENT IN AN ORGANIZED HEALTH CARE EDUCATION/TRAINING PROGRAM

## 2024-11-21 PROCEDURE — 84100 ASSAY OF PHOSPHORUS: CPT | Performed by: STUDENT IN AN ORGANIZED HEALTH CARE EDUCATION/TRAINING PROGRAM

## 2024-11-21 PROCEDURE — 80048 BASIC METABOLIC PNL TOTAL CA: CPT | Performed by: STUDENT IN AN ORGANIZED HEALTH CARE EDUCATION/TRAINING PROGRAM

## 2024-11-21 PROCEDURE — 36415 COLL VENOUS BLD VENIPUNCTURE: CPT | Performed by: STUDENT IN AN ORGANIZED HEALTH CARE EDUCATION/TRAINING PROGRAM

## 2024-11-21 PROCEDURE — 94761 N-INVAS EAR/PLS OXIMETRY MLT: CPT

## 2024-11-21 PROCEDURE — 85025 COMPLETE CBC W/AUTO DIFF WBC: CPT | Performed by: STUDENT IN AN ORGANIZED HEALTH CARE EDUCATION/TRAINING PROGRAM

## 2024-11-21 PROCEDURE — 83735 ASSAY OF MAGNESIUM: CPT | Performed by: STUDENT IN AN ORGANIZED HEALTH CARE EDUCATION/TRAINING PROGRAM

## 2024-11-21 PROCEDURE — 27000221 HC OXYGEN, UP TO 24 HOURS

## 2024-11-21 PROCEDURE — 99232 SBSQ HOSP IP/OBS MODERATE 35: CPT | Mod: ,,, | Performed by: STUDENT IN AN ORGANIZED HEALTH CARE EDUCATION/TRAINING PROGRAM

## 2024-11-21 PROCEDURE — 99233 SBSQ HOSP IP/OBS HIGH 50: CPT | Mod: ,,,

## 2024-11-21 PROCEDURE — 99900035 HC TECH TIME PER 15 MIN (STAT)

## 2024-11-21 RX ORDER — ACETAMINOPHEN 325 MG/1
650 TABLET ORAL EVERY 4 HOURS PRN
Status: DISCONTINUED | OUTPATIENT
Start: 2024-11-21 | End: 2024-11-25 | Stop reason: HOSPADM

## 2024-11-21 RX ADMIN — TIOTROPIUM BROMIDE INHALATION SPRAY 2 PUFF: 3.12 SPRAY, METERED RESPIRATORY (INHALATION) at 08:11

## 2024-11-21 RX ADMIN — FERROUS SULFATE TAB EC 325 MG (65 MG FE EQUIVALENT) 1 EACH: 325 (65 FE) TABLET DELAYED RESPONSE at 08:11

## 2024-11-21 RX ADMIN — ASPIRIN 81 MG: 81 TABLET, COATED ORAL at 08:11

## 2024-11-21 RX ADMIN — APIXABAN 5 MG: 5 TABLET, FILM COATED ORAL at 08:11

## 2024-11-21 RX ADMIN — AMIODARONE HYDROCHLORIDE 200 MG: 200 TABLET ORAL at 08:11

## 2024-11-21 RX ADMIN — EMPAGLIFLOZIN 25 MG: 25 TABLET, FILM COATED ORAL at 08:11

## 2024-11-21 RX ADMIN — FUROSEMIDE 30 MG/HR: 10 INJECTION, SOLUTION INTRAMUSCULAR; INTRAVENOUS at 09:11

## 2024-11-21 RX ADMIN — GUAIFENESIN 200 MG: 200 SOLUTION ORAL at 03:11

## 2024-11-21 RX ADMIN — GUAIFENESIN 200 MG: 200 SOLUTION ORAL at 09:11

## 2024-11-21 RX ADMIN — MUPIROCIN: 20 OINTMENT TOPICAL at 08:11

## 2024-11-21 RX ADMIN — SEVELAMER CARBONATE 1600 MG: 800 TABLET, FILM COATED ORAL at 04:11

## 2024-11-21 RX ADMIN — FUROSEMIDE 30 MG/HR: 10 INJECTION, SOLUTION INTRAMUSCULAR; INTRAVENOUS at 12:11

## 2024-11-21 RX ADMIN — ISOSORBIDE MONONITRATE 30 MG: 30 TABLET, EXTENDED RELEASE ORAL at 08:11

## 2024-11-21 RX ADMIN — ONDANSETRON 8 MG: 8 TABLET, ORALLY DISINTEGRATING ORAL at 08:11

## 2024-11-21 RX ADMIN — SEVELAMER CARBONATE 1600 MG: 800 TABLET, FILM COATED ORAL at 08:11

## 2024-11-21 RX ADMIN — SEVELAMER CARBONATE 1600 MG: 800 TABLET, FILM COATED ORAL at 11:11

## 2024-11-21 RX ADMIN — ATORVASTATIN CALCIUM 40 MG: 20 TABLET, FILM COATED ORAL at 08:11

## 2024-11-21 NOTE — ASSESSMENT & PLAN NOTE
Acute on chronic systolic HF, NYHA class III, stage D. On home dobutamine 5 mcg/kg/min. Etiology: NICM.  Hemodynamic status: warm, normotensive, severely hypervolemic.Patient not in cardiogenic shock given above profile and negative lactate      Plan:  --Continue IV lasix 30 mg /hr   --Treat underlying infection, is she is suspected to have UTI hold off Jardiance.  -- Continue to monitor urine output.  -- Monitor electrolytes closely. Maintain Mg >2 and K >4, check BMP q8h today after lasix bolus and increase in lasix drip and thereafter check BMP q12h.  -- HF Pathway: Sodium restriction <2 g, Fluid restriction < 1500 mL, Strict I/Os, Daily weights    Chief Complaint   Patient presents with     Follow Up For     51yr old male with a h/o chronic systolic heart failure secondary to ICM s/p mitraclip and complicated revascularization presenting for HF follow-up with labs prior.     KATYA Beck  11:27 AM     temporal

## 2024-11-21 NOTE — PROGRESS NOTES
[facility]  Cardiology  Progress Note    Patient Name: Hafsa Hawley  MRN: [id number]  Admission Date: 11/18/2024  Hospital Length of Stay: 3 days  Code Status: Full Code   Attending Physician: Peri Pastor MD   Primary Care Physician: Cristobal Ann MD  Expected Discharge Date: 11/23/2024  Principal Problem:Acute on chronic combined systolic and diastolic heart failure    Subjective:     Hospital Course:   No notes on file    Interval History: overnight patient had burning urination and wanted the saeed's removed. Now using pure wick. -830 since morning, net negative 3000 since admission.    ROS  Objective:     Vital Signs (Most Recent):  Temp: 97.5 °F (36.4 °C) (11/21/24 1120)  Pulse: 85 (11/21/24 1120)  Resp: 18 (11/21/24 1120)  BP: 116/78 (11/21/24 1120)  SpO2: 96 % (11/21/24 1120) Vital Signs (24h Range):  Temp:  [97.4 °F (36.3 °C)-98.4 °F (36.9 °C)] 97.5 °F (36.4 °C)  Pulse:  [78-92] 85  Resp:  [18-20] 18  SpO2:  [93 %-98 %] 96 %  BP: (106-128)/(58-80) 116/78     Weight: 85.9 kg (189 lb 6 oz)  Body mass index is 30.57 kg/m².     SpO2: 96 %         Intake/Output Summary (Last 24 hours) at 11/21/2024 1306  Last data filed at 11/21/2024 0800  Gross per 24 hour   Intake 682 ml   Output 2777 ml   Net -2095 ml       Lines/Drains/Airways       Peripherally Inserted Central Catheter Line  Duration             PICC Double Lumen 05/12/23 1534 right brachial 558 days                       Physical Exam     Constitutional:       Appearance: Normal appearance. She is obese.   Eyes:      Pupils: Pupils are equal, round, and reactive to light.   Neck:      Comments: JVD present   Cardiovascular:      Rate and Rhythm: Normal rate and regular rhythm.      Pulses: Normal pulses.      Heart sounds: Murmur heard.   Pulmonary:      Effort: Pulmonary effort is normal.      Breath sounds: Rales present.   Abdominal:      General: Abdomen is flat.      Palpations: Abdomen is soft.   Musculoskeletal:       Cervical back: Normal range of motion.      Right lower leg: Edema present.      Left lower leg: Edema present.   Neurological:      General: No focal deficit present.      Mental Status: She is alert and oriented to person, place, and time.   Psychiatric:         Mood and Affect: Mood normal.         Behavior: Behavior normal.   Significant Labs: All pertinent lab results from the last 24 hours have been reviewed.    Significant Imaging: Echocardiogram: Transthoracic echo (TTE) complete (Cupid Only):   Results for orders placed or performed during the hospital encounter of 11/18/24   Echo   Result Value Ref Range    LV Diastolic Volume 227.02 mL    Echo EF Estimated 21 %    LV Systolic Volume 179.58 mL    IVS 1.1 0.6 - 1.1 cm    LVIDd 6.6 (A) 3.5 - 6.0 cm    LVIDs 6.0 (A) 2.1 - 4.0 cm    LVOT diameter 2.0 cm    PW 1.2 (A) 0.6 - 1.1 cm    AV LVOT peak gradient 2 mmHg    LVOT mn grad 1 mmHg    LVOT peak marcos 0.7 m/s    LVOT peak VTI 12.7 cm    RV- carrillo basal diam 6.6 cm    RV S' 10.22 cm/s    LA size 5.50 cm    Left Atrium Major Axis 7.04 cm    Left Atrium Minor Axis 7.01 cm    LA Vol (MOD) 148.46 mL    RA Major Axis 6.92 cm    RA Area 32.2 cm2    AV valve area 2.2 cm2    AV area by cont VTI 2.1 cm2    AV peak gradient 4.8 mmHg    AV mean gradient 2.3 mmHg    Ao peak marcos 1.1 m/s    Ao VTI 18.2 cm    MV Peak A Marcos 0.27 m/s    E wave deceleration time 185.89 ms    MV Peak E Marcos 0.48 m/s    E/A ratio 1.78     LV LATERAL E/E' RATIO 6.00     LV SEPTAL E/E' RATIO 9.60     TDI LATERAL 0.08 m/s    TDI SEPTAL 0.05 m/s    TV peak gradient 42 mmHg    TR Max Marcos 3.24 m/s    Ascending aorta 3.77 cm    STJ 2.39 cm    IVC diameter 1.99 cm    Sinus 3.35 cm    LA WIDTH 4.90 cm    RA Width 4.52 cm    TAPSE 1.37 cm    BSA 2.01 m2    LVOT stroke volume 39.9 cm3    LV Systolic Volume Index 91.2 mL/m2    LV Diastolic Volume Index 115.24 mL/m2    LVOT area 3.1 cm2    FS 9.1 (A) 28 - 44 %    Left Ventricle Relative Wall Thickness 0.36 cm     LV mass 347.9 g    LV Mass Index 176.6 g/m2    E/E' ratio 7.38 m/s    JEREL 81.7 mL/m2    LA Vol 160.92 cm3    RV/LV Ratio 1.00 cm    JEREL (MOD) 75.4 mL/m2    AV Velocity Ratio 0.64     AV index (prosthetic) 0.70     MAMADOU by Velocity Ratio 2.0 cm²    Triscuspid Valve Regurgitation Peak Gradient 42 mmHg    Mean e' 0.07 m/s    ZLVIDS 4.31     ZLVIDD 1.53     TV resting pulmonary artery pressure 57 mmHg    RV TB RVSP 18 mmHg    Est. RA pres 15 mmHg    Narrative      Left Ventricle: The left ventricle is severely dilated. Normal wall   thickness but prominent trabeculations are seen in the apex. There is   eccentric hypertrophy. Severe global hypokinesis present. There is   severely reduced systolic function with a visually estimated ejection   fraction of 15 - 20%. Grade II diastolic dysfunction.    Right Ventricle: Severe right ventricular enlargement. Wall thickness   is normal. Systolic function is moderately reduced. Pacemaker lead present   in the ventricle.    Severe biatrial enlargement    Aortic Valve: The aortic valve is a trileaflet valve. There is mild   aortic valve sclerosis. Mildly restricted motion.    Mitral Valve: There is moderate regurgitation.    Tricuspid Valve: There is mild to moderate regurgitation.    Pulmonary Artery: There is moderate pulmonary hypertension. The   estimated pulmonary artery systolic pressure is 57 mmHg.    IVC/SVC: Elevated venous pressure at 15 mmHg.    There is a small posterior pericardial effusion. Ascites is also   present.      and EKG:   Assessment and Plan:     Brief HPI:     * Acute on chronic combined systolic and diastolic heart failure  Acute on chronic systolic HF, NYHA class III, stage D. On home dobutamine 5 mcg/kg/min. Etiology: NICM.  Hemodynamic status: warm, normotensive, severely hypervolemic.Patient not in cardiogenic shock given above profile and negative lactate      Plan:  --Continue IV lasix 30 mg /hr   --Continue palliative dobutmaine  --Treat  underlying infection, if she is suspected to have UTI hold off Jardiance.  -- Continue to monitor urine output.  -- Monitor electrolytes closely. Maintain Mg >2 and K >4, check BMP q8h today after lasix bolus and increase in lasix drip and thereafter check BMP q12h.  -- HF Pathway: Sodium restriction <2 g, Fluid restriction < 1500 mL, Strict I/Os, Daily weights         VTE Risk Mitigation (From admission, onward)           Ordered     apixaban tablet 5 mg  2 times daily         11/19/24 1214     Reason for No Pharmacological VTE Prophylaxis  Once        Question:  Reasons:  Answer:  Already adequately anticoagulated on oral Anticoagulants    11/18/24 1904     IP VTE HIGH RISK PATIENT  Once         11/18/24 1904     Place sequential compression device  Until discontinued         11/18/24 1904                    Mary Reyes MD  Cardiology  [facility] - [facility]

## 2024-11-21 NOTE — PROGRESS NOTES
[facility] - [facility]  Adult Nutrition  Progress Note    SUMMARY       Recommendations    Continue Diabetic diet, low Na, 1200 mL fluid.   Will order Boost GC daily   Encourage good intakes  B. Nursing please continue to document % intakes in chart.   Education on Low Na/Fluid restriction reviewed.  RD to monitor weights, intakes, labs     Goals:   Meet % of nutritional needs with diet  Maintain lean body mass during admission.      Nutrition Goal Status: new/continues  Communication of RD Recs: other (comment) (POC)      Assessment and Plan    Nutrition Problem  Unintentional weight loss     Related to (etiology):   Clinical course    Signs and Symptoms (as evidenced by):   3 lb wt loss since admission despite % intakes reported    Interventions/Recommendations (treatment strategy):  - Continue Diabetic diet, low Na, 1200 mL fluid.   If intakes <75% consider Boost GC daily  - Encourage good intakes  Nursing please continue to document % intakes in chart.   - Education on Low Na/Fluid restriction reviewed.  - RD to monitor weights, intakes, labs       Nutrition Diagnosis Status:   New      Malnutrition Assessment    NFPE to be conducted if clinically indicated     Reason for Assessment    Reason For Assessment: RD follow-up  Diagnosis: cardiac disease, renal disease, pulmonary disease, diabetes diagnosis/complications  General Information Comments: RD f/u with patient on education materials. RD notes wt loss since admin, % intakes reported. Pt reporting nausea and constipation, most protein foods are causing nausea (chicken, fish, burgers), pt had Ensure today when RD visited. Anti-nausea PRN but patient reports relief from nausea only sometimes.  Nutrition Discharge Planning: Low Na/Fluid diet    Nutrition Risk Screen    Nutrition Risk Screen: no indicators present    Nutrition/Diet History    Spiritual, Cultural Beliefs, Tenriism Practices, Values that Affect Care: no  Food Allergies:  "[facility]    Anthropometrics    Temp: 97.4 °F (36.3 °C)  Height: 5' 6" (167.6 cm)  Height (inches): 66 in  Weight Method: Standard Scale  Weight: 85.9 kg (189 lb 6 oz)  Weight (lb): 189.38 lb  Ideal Body Weight (IBW), Female: 130 lb  % Ideal Body Weight, Female (lb): 147.88 %  BMI (Calculated): 30.6  BMI Grade: 30 - 34.9- obesity - grade I       Lab/Procedures/Meds    Pertinent Labs Reviewed: reviewed  Pertinent Labs Comments: H/H: 9.5/33.3, MCV: 66, MCH: 18.7, MCHC: 28.5, BUN: 96, Creatinine: 3.1, GFR: 16.7, Phos: 5.7  Pertinent Medications Reviewed: reviewed  Pertinent Medications Comments: albuterol, aluminum-magnesium hydroxide-simethicone, atorvastatin, bumetanide, dobutamine,  empagliflozin, ferrous sulfate, furosemide, isosorbide, mononitrate, metoprolol succinate, ondansetron, sevelamer carbonate, simethicone, tiotropium bromide, bowel regimen      Estimated/Assessed Needs    Weight Used For Calorie Calculations: 87.2 kg (192 lb 3.9 oz)  Energy Calorie Requirements (kcal): 1829 kcal  Energy Need Method: Griggs- Larryor  Protein Requirements: 35-66 g (0.6- 1.12 g/kg IBW (CKD-HF NCM))  Weight Used For Protein Calculations: 59 kg (130 lb 1.1 oz)  Fluid Requirements (mL): 1200 mL (Fluid restriction)  Estimated Fluid Requirement Method: other (see comments) (diet order)  RDA Method (mL): 1829  CHO Requirement: 206-297 g      Nutrition Prescription Ordered    Current Diet Order: Diabetic (2000 kcal, 75g), 2g Na, 1200mL fluid    Evaluation of Received Nutrient/Fluid Intake    I/O: -2.8 L since admin  Comments: LBM: 11/19  % Intake of Estimated Energy Needs: 75 - 100 %  % Meal Intake: 75 - 100 %    Nutrition Risk    Level of Risk/Frequency of Follow-up: moderate     Monitor and Evaluation    Food and Nutrient Intake: energy intake, food and beverage intake  Food and Nutrient Adminstration: diet order  Knowledge/Beliefs/Attitudes: food and nutrition knowledge/skill, beliefs and attitudes  Physical Activity and Function: " nutrition-related ADLs and IADLs  Anthropometric Measurements: weight, weight change, body mass index  Biochemical Data, Medical Tests and Procedures: gastrointestinal profile, electrolyte and renal panel, glucose/endocrine profile, inflammatory profile, lipid profile  Nutrition-Focused Physical Findings: overall appearance     Nutrition Follow-Up    RD Follow-up?: Yes    Michael Solo, Dietetic Intern   I certify that I directed the dietetic intern in service delivery and guided them using my skilled judgment. As the cosigning dietitian, I have reviewed the dietetic interns documentation and am responsible for the treatment, assessment, and plan.

## 2024-11-21 NOTE — PROGRESS NOTES
[facility] - Cardiology [facility] Medicine  Progress Note    Patient Name: Hafsa Hawley  MRN: [id number]  Patient Class: IP- Inpatient   Admission Date: 11/18/2024  Length of Stay: 3 days  Attending Physician: Peri Pastor MD  Primary Care Provider: Cristobal Ann MD        Subjective:     Principal Problem:Acute on chronic combined systolic and diastolic heart failure        HPI:  Hafsa Hawley is a 59 y.o. female with severe combined CHF (EF 10-15%) with ICD in place, AFib and VTach, CKD 4, T2DM not on insulin, restrictive lung disease, MELISSA, anemia, chronic pain on opioids, chronic prescription benzodiazepine use who presents from heart failure clinic for CHF exacerbation.     She reports that over the past few days, she has had an increase in her chronic shortness of breath particularly with exertion. She also notes lower extremity edema, which she usually doesn't get unless she is in severe CHF exacerbation. Has associated cough with chest pressure, which increases with cough.     She ran out of her Bumex due to the pharmacy not having it approximately one week ago. She has tried taking her Metolazone instead, which she usually takes 2-3 times per week PRN, but has been taking daily but has had persistent dyspnea and swelling despite this.     She presented to Cardiology clinic today, who recommended she be admitted for diuresis.     Overview/Hospital Course:  Referred from [facility] clinic due to concern for decompensated CHF. Not taking bumex x10 days. Cardiology consulted on arrival.  Started on IVP diuresis with minimal UOP. S/p lasix 80mg IVP x2 with lasix ggt, titrating as indicated.  Given lack of candidacy for transplant, pt reportedly went to [facility] for second opinion and was again denied for advanced options. Palliative care team now on board to discuss goals of care given poor prognosis and lack of options.  Additionally consulted Psychiatry given depression like symptoms while pt  navigates through GO conversations.    Interval History: NAEON. Improved diuresis yesterday w/ 2.7L UOP, net -1.64L, & down 0.3kg in last 24hrs. Barton removed yesterday evening per pt request d/t discomfort. Pt reports poor sleep overnight d/t issues w/ purewick. No urinary issues though since removal of Barton- denies dysuria, hematuria, etc. Given pt asymptomatic, will hold off on abx pending urine cx. Holding Jardiance per cardiology recs. Otherwise remains HDS & relatively asymptomatic- no palpitations, chest pain or SOB. Plan to continue current cardiac package/diuretic regimen for now. Cardiology & Palliative Medicine following.     Review of Systems  Objective:     Vital Signs (Most Recent):  Temp: 97.5 °F (36.4 °C) (11/19/24 1136)  Pulse: 84 (11/19/24 1136)  Resp: 19 (11/19/24 1136)  BP: 114/62 (11/19/24 1136)  SpO2: 98 % (11/19/24 1136) Vital Signs (24h Range):  Temp:  [97.4 °F (36.3 °C)-98.3 °F (36.8 °C)] 97.5 °F (36.4 °C)  Pulse:  [77-89] 84  Resp:  [16-19] 19  SpO2:  [93 %-99 %] 98 %  BP: ()/(57-79) 114/62     Weight: 87.2 kg (192 lb 3.9 oz)  Body mass index is 31.03 kg/m².    Intake/Output Summary (Last 24 hours) at 11/19/2024 1218  Last data filed at 11/19/2024 0944  Gross per 24 hour   Intake 240 ml   Output 200 ml   Net 40 ml         Physical Exam  Constitutional:       General: She is not in acute distress.     Appearance: She is well-developed. She is ill-appearing (chronically). She is not toxic-appearing or diaphoretic.   HENT:      Head: Normocephalic and atraumatic.      Mouth/Throat:      Mouth: Mucous membranes are moist.   Eyes:      Conjunctiva/sclera: Conjunctivae normal.   Cardiovascular:      Rate and Rhythm: Normal rate. Rhythm irregular.      Heart sounds: Murmur heard.   Pulmonary:      Effort: Pulmonary effort is normal. No respiratory distress.      Breath sounds: Rales (scattered crackles) present.   Abdominal:      General: Bowel sounds are normal.      Palpations: Abdomen  is soft.      Tenderness: There is no abdominal tenderness. There is no guarding.   Musculoskeletal:      Right lower le+ Pitting Edema present.      Left lower le+ Pitting Edema present.   Skin:     Coloration: Skin is not jaundiced or pale.   Neurological:      General: No focal deficit present.      Mental Status: She is alert and oriented to person, place, and time.   Psychiatric:         Mood and Affect: Mood normal.         Behavior: Behavior normal.             Significant Labs: All pertinent labs within the past 24 hours have been reviewed.    Significant Imaging: I have reviewed all pertinent imaging results/findings within the past 24 hours.    Assessment/Plan:      * Acute on chronic combined systolic and diastolic heart failure  -Patient with known severe CHF with last EF 10-15% and Grade 2 DD on TTE 2024, on chronic dobutamine gtt. Followed by advanced HF clinic, and has been considered for transplant however declined by 2 facilities. Presents with signs of volume overload on exam and worsening dyspnes, consistent with CHF exacerbation.   -TTE redemonstrating reduced EF although slighlty higher 15-20%. CVP 15mmHg  Plan:  -cardiology on board, appreciate assistance  -continue Lasix ggt, titrate as indicated  -Monitor UOP closely  - Will continue home dobutamine as well. Continue GDMT as tolerated, although limited chronically due to advanced CKD and softer BPs.  -Will obtain daily weights and monitor renal function and electrolytes on serial labs. Strict intake and output along with fluid restricted diet. Monitor on telemetry.     Goals of care, counseling/discussion  Advance Care Planning    [facility]  I engaged the patient in a voluntary conversation about advance care planning and we specifically addressed what the goals of care would be moving forward, in light of the patient's change in clinical status, specifically advanced CHF.  We did specifically address the patient's likely prognosis, which  is poor.  We explored the patient's values and preferences for future care.  The patient endorses that what is most important right now is to focus on  TBD, pending further discussions with palliative care team    Accordingly, we have decided that the best plan to meet the patient's goals includes  TBD    A total of 10 min was spent on advance care planning, goals of care discussion, emotional support, formulating and communicating prognosis and exploring burden/benefit of various approaches of treatment. This discussion occurred on a fully voluntary basis with the verbal consent of the patient and/or family.          Chronic pain with opioid use  Continue home regimen as verified by PDMP.      Type 2 diabetes mellitus without complication, without long-term current use of insulin  Recent A1c 5.7, well-controlled likely due to advancing CKD. Continue home Jardiance for CHF. No indication for insulin at this time, as to avoid hypoglycemia in advanced CKD.      Hyperlipidemia associated with type 2 diabetes mellitus  Continue hospital formulary of home statin.       Anemia of chronic disease  Stable, and around baseline without indication for transfusion. Monitor.     Paroxysmal A-fib  Patient has persistent (7 days or more) atrial fibrillation. [id number] Score: 3. The patients heart rate in the last 24 hours is as follows:  Pulse  Min: 85  Max: 89     Antiarrhythmics  amiodarone tablet 200 mg, Daily, Oral  metoprolol succinate (TOPROL-XL) 24 hr split tablet 12.5 mg, Daily, Oral    Anticoagulants  apixaban tablet 2.5 mg, 2 times daily, Oral    Plan  - Replete lytes with a goal of K>4, Mg >2  - Patient is anticoagulated, see medications listed above.  - Patient's afib is currently controlled  - Continue home meds.     Pulmonary HTN  Continue diuresis with goal of euvolemia.      CKD (chronic kidney disease), stage IV  Renal function remains around baseline; Estimated Creatinine Clearance: 21.6 mL/min (A) (based on SCr  of 3.1 mg/dL (H)). according to latest data. Based on current GFR, CKD stage is stage 4 - GFR 15-29. Will avoid nephrotoxic agents as able, and renally dose all meds as applicable.    -Cr high but still below baseline  -Monitor closely during diuresis    Chronic prescription benzodiazepine use  Verified by PDMP. Continue reduced dose of home regimen as to not precipitate withdrawal, however cautiously as this is likely not an optimal medication for this patient with multiple advanced comorbidities and chronic respiratory failure.       Chronic respiratory failure with hypoxia  Reports using home oxygen, likely with chronic intermittent respiratory failure due to restrictive lung disease on PFTs and advanced CHF. Wean oxygen to maintain sats 90% or greater.     Paroxysmal supraventricular tachycardia  Continue Amiodarone (started during recent hospitalization in [place]). Monitor on telemetry.       Depression due to physical illness  -Previously on zoloft, pt apparently self-discontinued  -Will involve psychiatry team at this time        MELISSA (obstructive sleep apnea)  Non-compliant with CPAP. Monitor.       Essential hypertension  Chronic, and currently controlled. Latest blood pressure and vitals reviewed-   While in the hospital, will manage blood pressure as follows; Continue home antihypertensive regimen along with diuresis.    Will utilize p.r.n. blood pressure medication only if patient's blood pressure greater than 180/110 and she develops symptoms such as worsening chest pain or shortness of breath.      VTE Risk Mitigation (From admission, onward)           Ordered     apixaban tablet 5 mg  2 times daily         11/19/24 1214     Reason for No Pharmacological VTE Prophylaxis  Once        Question:  Reasons:  Answer:  Already adequately anticoagulated on oral Anticoagulants    11/18/24 1904     IP VTE HIGH RISK PATIENT  Once         11/18/24 1904     Place sequential compression device  Until discontinued          11/18/24 1904                    Discharge Planning   CHIQUI: 11/23/2024     Code Status: Full Code   Is the patient medically ready for discharge?:     Reason for patient still in hospital (select all that apply): Patient trending condition  Discharge Plan A: Home with family, Other (home infusion)                  Peri Pastor MD  Department of Hospital Medicine   [facility] - Cardiology Stepdown

## 2024-11-21 NOTE — SUBJECTIVE & OBJECTIVE
Interval History: overnight patient had burning urination and wanted the saeed's removed. Now using pure wick. -830 since morning, net negative 3000 since admission.    ROS  Objective:     Vital Signs (Most Recent):  Temp: 97.5 °F (36.4 °C) (11/21/24 1120)  Pulse: 85 (11/21/24 1120)  Resp: 18 (11/21/24 1120)  BP: 116/78 (11/21/24 1120)  SpO2: 96 % (11/21/24 1120) Vital Signs (24h Range):  Temp:  [97.4 °F (36.3 °C)-98.4 °F (36.9 °C)] 97.5 °F (36.4 °C)  Pulse:  [78-92] 85  Resp:  [18-20] 18  SpO2:  [93 %-98 %] 96 %  BP: (106-128)/(58-80) 116/78     Weight: 85.9 kg (189 lb 6 oz)  Body mass index is 30.57 kg/m².     SpO2: 96 %         Intake/Output Summary (Last 24 hours) at 11/21/2024 1306  Last data filed at 11/21/2024 0800  Gross per 24 hour   Intake 682 ml   Output 2777 ml   Net -2095 ml       Lines/Drains/Airways       Peripherally Inserted Central Catheter Line  Duration             PICC Double Lumen 05/12/23 1534 right brachial 558 days                       Physical Exam     Constitutional:       Appearance: Normal appearance. She is obese.   Eyes:      Pupils: Pupils are equal, round, and reactive to light.   Neck:      Comments: JVD present   Cardiovascular:      Rate and Rhythm: Normal rate and regular rhythm.      Pulses: Normal pulses.      Heart sounds: Murmur heard.   Pulmonary:      Effort: Pulmonary effort is normal.      Breath sounds: Rales present.   Abdominal:      General: Abdomen is flat.      Palpations: Abdomen is soft.   Musculoskeletal:      Cervical back: Normal range of motion.      Right lower leg: Edema present.      Left lower leg: Edema present.   Neurological:      General: No focal deficit present.      Mental Status: She is alert and oriented to person, place, and time.   Psychiatric:         Mood and Affect: Mood normal.         Behavior: Behavior normal.   Significant Labs: All pertinent lab results from the last 24 hours have been reviewed.    Significant Imaging: Echocardiogram:  Transthoracic echo (TTE) complete (Cupid Only):   Results for orders placed or performed during the hospital encounter of 11/18/24   Echo   Result Value Ref Range    LV Diastolic Volume 227.02 mL    Echo EF Estimated 21 %    LV Systolic Volume 179.58 mL    IVS 1.1 0.6 - 1.1 cm    LVIDd 6.6 (A) 3.5 - 6.0 cm    LVIDs 6.0 (A) 2.1 - 4.0 cm    LVOT diameter 2.0 cm    PW 1.2 (A) 0.6 - 1.1 cm    AV LVOT peak gradient 2 mmHg    LVOT mn grad 1 mmHg    LVOT peak marcos 0.7 m/s    LVOT peak VTI 12.7 cm    RV- carrillo basal diam 6.6 cm    RV S' 10.22 cm/s    LA size 5.50 cm    Left Atrium Major Axis 7.04 cm    Left Atrium Minor Axis 7.01 cm    LA Vol (MOD) 148.46 mL    RA Major Axis 6.92 cm    RA Area 32.2 cm2    AV valve area 2.2 cm2    AV area by cont VTI 2.1 cm2    AV peak gradient 4.8 mmHg    AV mean gradient 2.3 mmHg    Ao peak marcos 1.1 m/s    Ao VTI 18.2 cm    MV Peak A Marcos 0.27 m/s    E wave deceleration time 185.89 ms    MV Peak E Marcos 0.48 m/s    E/A ratio 1.78     LV LATERAL E/E' RATIO 6.00     LV SEPTAL E/E' RATIO 9.60     TDI LATERAL 0.08 m/s    TDI SEPTAL 0.05 m/s    TV peak gradient 42 mmHg    TR Max Marcos 3.24 m/s    Ascending aorta 3.77 cm    STJ 2.39 cm    IVC diameter 1.99 cm    Sinus 3.35 cm    LA WIDTH 4.90 cm    RA Width 4.52 cm    TAPSE 1.37 cm    BSA 2.01 m2    LVOT stroke volume 39.9 cm3    LV Systolic Volume Index 91.2 mL/m2    LV Diastolic Volume Index 115.24 mL/m2    LVOT area 3.1 cm2    FS 9.1 (A) 28 - 44 %    Left Ventricle Relative Wall Thickness 0.36 cm    LV mass 347.9 g    LV Mass Index 176.6 g/m2    E/E' ratio 7.38 m/s    JEREL 81.7 mL/m2    LA Vol 160.92 cm3    RV/LV Ratio 1.00 cm    JEREL (MOD) 75.4 mL/m2    AV Velocity Ratio 0.64     AV index (prosthetic) 0.70     MAMADOU by Velocity Ratio 2.0 cm²    Triscuspid Valve Regurgitation Peak Gradient 42 mmHg    Mean e' 0.07 m/s    ZLVIDS 4.31     ZLVIDD 1.53     TV resting pulmonary artery pressure 57 mmHg    RV TB RVSP 18 mmHg    Est. RA pres 15 mmHg     Narrative      Left Ventricle: The left ventricle is severely dilated. Normal wall   thickness but prominent trabeculations are seen in the apex. There is   eccentric hypertrophy. Severe global hypokinesis present. There is   severely reduced systolic function with a visually estimated ejection   fraction of 15 - 20%. Grade II diastolic dysfunction.    Right Ventricle: Severe right ventricular enlargement. Wall thickness   is normal. Systolic function is moderately reduced. Pacemaker lead present   in the ventricle.    Severe biatrial enlargement    Aortic Valve: The aortic valve is a trileaflet valve. There is mild   aortic valve sclerosis. Mildly restricted motion.    Mitral Valve: There is moderate regurgitation.    Tricuspid Valve: There is mild to moderate regurgitation.    Pulmonary Artery: There is moderate pulmonary hypertension. The   estimated pulmonary artery systolic pressure is 57 mmHg.    IVC/SVC: Elevated venous pressure at 15 mmHg.    There is a small posterior pericardial effusion. Ascites is also   present.      and EKG:
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
## 2024-11-21 NOTE — PLAN OF CARE
Recommendations    Continue Diabetic diet, low Na, 1200 mL fluid.   Will order Boost GC daily   Encourage good intakes  B. Nursing please continue to document % intakes in chart.   Education on Low Na/Fluid restriction reviewed.  RD to monitor weights, intakes, labs     Goals:   Meet % of nutritional needs with diet  Maintain lean body mass during admission.      Nutrition Goal Status: new/continues  Communication of RD Recs: other (comment) (POC)  I certify that I directed the dietetic intern in service delivery and guided them using my skilled judgment. As the cosigning dietitian, I have reviewed the dietetic interns documentation and am responsible for the treatment, assessment, and plan.

## 2024-11-21 NOTE — PLAN OF CARE
Alert and oriented, denies pain, no s/s of distress, VS WNL, comfort measures provided, UA sample sent, cough med prn given, plan of care explained to patient, call light within reach, will continue to monitor.    Problem: Adult Inpatient Plan of Care  Goal: Plan of Care Review  Outcome: Progressing  Goal: Absence of Hospital-Acquired Illness or Injury  Outcome: Progressing  Goal: Optimal Comfort and Wellbeing  Outcome: Progressing  Goal: Readiness for Transition of Care  Outcome: Progressing     Problem: Diabetes Comorbidity  Goal: Blood Glucose Level Within Targeted Range  Outcome: Progressing     Problem: Heart Failure  Goal: Fluid and Electrolyte Balance  Outcome: Progressing

## 2024-11-21 NOTE — CONSULTS
RAJIV consulted to eval PICC; CHANG PICC to KELIN, no complications to site, infusing both ports, after power flushing +blood return to both ports, end caps changed, continued infusions.

## 2024-11-22 LAB
ALBUMIN SERPL BCP-MCNC: 3.2 G/DL (ref 3.5–5.2)
ANION GAP SERPL CALC-SCNC: 14 MMOL/L (ref 8–16)
BACTERIA UR CULT: NO GROWTH
BUN SERPL-MCNC: 97 MG/DL (ref 6–20)
CALCIUM SERPL-MCNC: 8.9 MG/DL (ref 8.7–10.5)
CHLORIDE SERPL-SCNC: 99 MMOL/L (ref 95–110)
CO2 SERPL-SCNC: 30 MMOL/L (ref 23–29)
CREAT SERPL-MCNC: 3 MG/DL (ref 0.5–1.4)
ERYTHROCYTE [DISTWIDTH] IN BLOOD BY AUTOMATED COUNT: 34.9 % (ref 11.5–14.5)
EST. GFR  (NO RACE VARIABLE): 17.4 ML/MIN/1.73 M^2
GLUCOSE SERPL-MCNC: 65 MG/DL (ref 70–110)
HCT VFR BLD AUTO: 31.4 % (ref 37–48.5)
HGB BLD-MCNC: 9.2 G/DL (ref 12–16)
MAGNESIUM SERPL-MCNC: 2.3 MG/DL (ref 1.6–2.6)
MCH RBC QN AUTO: 19.3 PG (ref 27–31)
MCHC RBC AUTO-ENTMCNC: 29.3 G/DL (ref 32–36)
MCV RBC AUTO: 66 FL (ref 82–98)
PHOSPHATE SERPL-MCNC: 4.5 MG/DL (ref 2.7–4.5)
PLATELET # BLD AUTO: 122 K/UL (ref 150–450)
PMV BLD AUTO: ABNORMAL FL (ref 9.2–12.9)
POCT GLUCOSE: 93 MG/DL (ref 70–110)
POTASSIUM SERPL-SCNC: 3.7 MMOL/L (ref 3.5–5.1)
RBC # BLD AUTO: 4.77 M/UL (ref 4–5.4)
SODIUM SERPL-SCNC: 143 MMOL/L (ref 136–145)
WBC # BLD AUTO: 4.37 K/UL (ref 3.9–12.7)

## 2024-11-22 PROCEDURE — 25000242 PHARM REV CODE 250 ALT 637 W/ HCPCS: Performed by: STUDENT IN AN ORGANIZED HEALTH CARE EDUCATION/TRAINING PROGRAM

## 2024-11-22 PROCEDURE — 99223 1ST HOSP IP/OBS HIGH 75: CPT | Mod: ,,, | Performed by: INTERNAL MEDICINE

## 2024-11-22 PROCEDURE — 20600001 HC STEP DOWN PRIVATE ROOM

## 2024-11-22 PROCEDURE — 25000003 PHARM REV CODE 250: Performed by: STUDENT IN AN ORGANIZED HEALTH CARE EDUCATION/TRAINING PROGRAM

## 2024-11-22 PROCEDURE — 94761 N-INVAS EAR/PLS OXIMETRY MLT: CPT

## 2024-11-22 PROCEDURE — 80069 RENAL FUNCTION PANEL: CPT | Performed by: STUDENT IN AN ORGANIZED HEALTH CARE EDUCATION/TRAINING PROGRAM

## 2024-11-22 PROCEDURE — 25000003 PHARM REV CODE 250: Performed by: PHYSICIAN ASSISTANT

## 2024-11-22 PROCEDURE — 83735 ASSAY OF MAGNESIUM: CPT | Performed by: STUDENT IN AN ORGANIZED HEALTH CARE EDUCATION/TRAINING PROGRAM

## 2024-11-22 PROCEDURE — 99900035 HC TECH TIME PER 15 MIN (STAT)

## 2024-11-22 PROCEDURE — 63600175 PHARM REV CODE 636 W HCPCS: Performed by: STUDENT IN AN ORGANIZED HEALTH CARE EDUCATION/TRAINING PROGRAM

## 2024-11-22 PROCEDURE — 85027 COMPLETE CBC AUTOMATED: CPT | Performed by: STUDENT IN AN ORGANIZED HEALTH CARE EDUCATION/TRAINING PROGRAM

## 2024-11-22 PROCEDURE — 94640 AIRWAY INHALATION TREATMENT: CPT

## 2024-11-22 RX ORDER — FLUTICASONE PROPIONATE 50 MCG
2 SPRAY, SUSPENSION (ML) NASAL DAILY
Status: DISCONTINUED | OUTPATIENT
Start: 2024-11-22 | End: 2024-11-25 | Stop reason: HOSPADM

## 2024-11-22 RX ORDER — POTASSIUM CHLORIDE 750 MG/1
30 CAPSULE, EXTENDED RELEASE ORAL ONCE
Status: COMPLETED | OUTPATIENT
Start: 2024-11-22 | End: 2024-11-22

## 2024-11-22 RX ORDER — GUAIFENESIN 600 MG/1
600 TABLET, EXTENDED RELEASE ORAL 2 TIMES DAILY
Status: DISCONTINUED | OUTPATIENT
Start: 2024-11-22 | End: 2024-11-25 | Stop reason: HOSPADM

## 2024-11-22 RX ORDER — CAMPHOR
SPIRIT TOPICAL 4 TIMES DAILY PRN
Status: DISCONTINUED | OUTPATIENT
Start: 2024-11-22 | End: 2024-11-25 | Stop reason: HOSPADM

## 2024-11-22 RX ORDER — CETIRIZINE HYDROCHLORIDE 10 MG/1
10 TABLET ORAL DAILY
Status: DISCONTINUED | OUTPATIENT
Start: 2024-11-22 | End: 2024-11-25

## 2024-11-22 RX ADMIN — MUPIROCIN: 20 OINTMENT TOPICAL at 08:11

## 2024-11-22 RX ADMIN — ISOSORBIDE MONONITRATE 30 MG: 30 TABLET, EXTENDED RELEASE ORAL at 08:11

## 2024-11-22 RX ADMIN — CETIRIZINE HYDROCHLORIDE 10 MG: 10 TABLET, FILM COATED ORAL at 02:11

## 2024-11-22 RX ADMIN — FUROSEMIDE 30 MG/HR: 10 INJECTION, SOLUTION INTRAMUSCULAR; INTRAVENOUS at 04:11

## 2024-11-22 RX ADMIN — APIXABAN 5 MG: 5 TABLET, FILM COATED ORAL at 08:11

## 2024-11-22 RX ADMIN — FLUTICASONE PROPIONATE 100 MCG: 50 SPRAY, METERED NASAL at 04:11

## 2024-11-22 RX ADMIN — ATORVASTATIN CALCIUM 40 MG: 20 TABLET, FILM COATED ORAL at 08:11

## 2024-11-22 RX ADMIN — EMPAGLIFLOZIN 10 MG: 10 TABLET, FILM COATED ORAL at 09:11

## 2024-11-22 RX ADMIN — PREGABALIN 50 MG: 25 CAPSULE ORAL at 08:11

## 2024-11-22 RX ADMIN — POTASSIUM CHLORIDE 30 MEQ: 750 CAPSULE, EXTENDED RELEASE ORAL at 08:11

## 2024-11-22 RX ADMIN — FERROUS SULFATE TAB EC 325 MG (65 MG FE EQUIVALENT) 1 EACH: 325 (65 FE) TABLET DELAYED RESPONSE at 08:11

## 2024-11-22 RX ADMIN — ASPIRIN 81 MG: 81 TABLET, COATED ORAL at 08:11

## 2024-11-22 RX ADMIN — GUAIFENESIN 600 MG: 600 TABLET, EXTENDED RELEASE ORAL at 08:11

## 2024-11-22 RX ADMIN — TIOTROPIUM BROMIDE INHALATION SPRAY 2 PUFF: 3.12 SPRAY, METERED RESPIRATORY (INHALATION) at 08:11

## 2024-11-22 RX ADMIN — SEVELAMER CARBONATE 1600 MG: 800 TABLET, FILM COATED ORAL at 07:11

## 2024-11-22 RX ADMIN — DOBUTAMINE HYDROCHLORIDE 5 MCG/KG/MIN: 400 INJECTION INTRAVENOUS at 08:11

## 2024-11-22 RX ADMIN — SEVELAMER CARBONATE 1600 MG: 800 TABLET, FILM COATED ORAL at 11:11

## 2024-11-22 RX ADMIN — ONDANSETRON 8 MG: 8 TABLET, ORALLY DISINTEGRATING ORAL at 09:11

## 2024-11-22 RX ADMIN — AMIODARONE HYDROCHLORIDE 200 MG: 200 TABLET ORAL at 08:11

## 2024-11-22 RX ADMIN — ONDANSETRON 8 MG: 8 TABLET, ORALLY DISINTEGRATING ORAL at 05:11

## 2024-11-22 RX ADMIN — SEVELAMER CARBONATE 1600 MG: 800 TABLET, FILM COATED ORAL at 04:11

## 2024-11-22 NOTE — PROGRESS NOTES
Arie Vazquez - Cardiology [facility] Medicine  Progress Note    Patient Name: Hafsa Hawley  MRN: [id number]  Patient Class: IP- Inpatient   Admission Date: 11/18/2024  Length of Stay: 4 days  Attending Physician: Peri Pastor MD  Primary Care Provider: Cristobal Ann MD        Subjective:     Principal Problem:Acute on chronic combined systolic and diastolic heart failure        HPI:  Hafsa Hawley is a 59 y.o. female with severe combined CHF (EF 10-15%) with ICD in place, AFib and VTach, CKD 4, T2DM not on insulin, restrictive lung disease, MELISSA, anemia, chronic pain on opioids, chronic prescription benzodiazepine use who presents from heart failure clinic for CHF exacerbation.     She reports that over the past few days, she has had an increase in her chronic shortness of breath particularly with exertion. She also notes lower extremity edema, which she usually doesn't get unless she is in severe CHF exacerbation. Has associated cough with chest pressure, which increases with cough.     She ran out of her Bumex due to the pharmacy not having it approximately one week ago. She has tried taking her Metolazone instead, which she usually takes 2-3 times per week PRN, but has been taking daily but has had persistent dyspnea and swelling despite this.     She presented to Cardiology clinic today, who recommended she be admitted for diuresis.     Overview/Hospital Course:  Referred from [facility] clinic due to concern for decompensated CHF. Not taking bumex x10 days. Cardiology consulted on arrival.  Started on IVP diuresis with minimal UOP. S/p lasix 80mg IVP x2, ultimately placed on lasix ggt. Given lack of candidacy for transplant, pt reportedly went to [facility] for second opinion and was again denied for advanced options. Palliative care team following to discuss goals of care given poor prognosis and lack of options.  Additionally consulted Psychiatry given depression like symptoms while pt navigates  through GOC conversations. Lasix ggt increased to 30cc/hr with continuation of dobutamine ggt w/ much improved UOP.     Interval History: MANUEL. Continues to diurese well w/ 3.5L UOP, net -2.5L, & down 1.9kg in last 24hrs. Urine cx w/o growth & pt w/o urinary sx. Jardiance started. Pt reports continued mild cough with sinus congestion. Mucolytics, nasal spray & antihistamine added. Cardiology following- continue with current cardiac package for today, and likely downtitrate lasix ggt tmrw. Palliative following; pt declines hospice at this time. Pt also reports recent rash to lateral aspect of ankles bilaterally w/ assoc burning pain. Also w/ very dry, flaky skin to pedal surface of feet, but pain/burning only localized to lateral ankles. Wound care & Derm consulted.     Review of Systems  Objective:     Vital Signs (Most Recent):  Temp: 97.6 °F (36.4 °C) (11/22/24 1143)  Pulse: 88 (11/22/24 1143)  Resp: 18 (11/22/24 1143)  BP: 124/75 (11/22/24 1143)  SpO2: 99 % (11/22/24 1143) Vital Signs (24h Range):  Temp:  [97.6 °F (36.4 °C)-98.6 °F (37 °C)] 97.6 °F (36.4 °C)  Pulse:  [82-93] 88  Resp:  [16-18] 18  SpO2:  [94 %-99 %] 99 %  BP: (110-141)/(69-85) 124/75     Weight: 84 kg (185 lb 3 oz)  Body mass index is 29.89 kg/m².    Intake/Output Summary (Last 24 hours) at 11/22/2024 1450  Last data filed at 11/22/2024 0927  Gross per 24 hour   Intake 1180 ml   Output 3080 ml   Net -1900 ml         Physical Exam  Constitutional:       General: She is not in acute distress.     Appearance: She is well-developed. She is ill-appearing (chronically). She is not toxic-appearing or diaphoretic.   HENT:      Head: Normocephalic and atraumatic.      Mouth/Throat:      Mouth: Mucous membranes are moist.   Eyes:      Conjunctiva/sclera: Conjunctivae normal.   Cardiovascular:      Rate and Rhythm: Normal rate. Rhythm irregular.      Heart sounds: Murmur heard.   Pulmonary:      Effort: Pulmonary effort is normal. No respiratory distress.       Breath sounds: Rales (rare bibasilar crackles) present.   Abdominal:      General: Bowel sounds are normal.      Palpations: Abdomen is soft.      Tenderness: There is no abdominal tenderness. There is no guarding.   Musculoskeletal:      Right lower le+ Pitting Edema present.      Left lower le+ Pitting Edema present.   Skin:     Coloration: Skin is not jaundiced or pale.      Findings: Rash present.   Neurological:      General: No focal deficit present.      Mental Status: She is alert and oriented to person, place, and time.   Psychiatric:         Mood and Affect: Mood normal.         Behavior: Behavior normal.                    Significant Labs: All pertinent labs within the past 24 hours have been reviewed.    Significant Imaging: I have reviewed all pertinent imaging results/findings within the past 24 hours.    Assessment/Plan:      * Acute on chronic combined systolic and diastolic heart failure  -Patient with known severe CHF with last EF 10-15% and Grade 2 DD on TTE 2024, on chronic dobutamine gtt. Followed by advanced HF clinic, and has been considered for transplant however declined by 2 facilities. Presents with signs of volume overload on exam and worsening dyspnes, consistent with CHF exacerbation.   -TTE redemonstrating reduced EF although slighlty higher 15-20%. CVP 15mmHg  Plan:  -cardiology on board, appreciate assistance  -continue Lasix ggt, titrate as indicated  -Monitor UOP closely  - Will continue home dobutamine as well. Continue GDMT as tolerated, although limited chronically due to advanced CKD and softer BPs.  - Unable to add BB 2/2 dobutamine.   - Jardiance added   -Will obtain daily weights and monitor renal function and electrolytes on serial labs. Strict intake and output along with fluid restricted diet. Monitor on telemetry.     Goals of care, counseling/discussion  Advance Care Planning    [facility]  I engaged the patient in a voluntary conversation about advance care  planning and we specifically addressed what the goals of care would be moving forward, in light of the patient's change in clinical status, specifically advanced CHF.  We did specifically address the patient's likely prognosis, which is poor.  We explored the patient's values and preferences for future care.  The patient endorses that what is most important right now is to focus on  TBD, pending further discussions with palliative care team    Accordingly, we have decided that the best plan to meet the patient's goals includes  TBD    A total of 10 min was spent on advance care planning, goals of care discussion, emotional support, formulating and communicating prognosis and exploring burden/benefit of various approaches of treatment. This discussion occurred on a fully voluntary basis with the verbal consent of the patient and/or family.          Chronic pain with opioid use  Continue home regimen as verified by PDMP.    Type 2 diabetes mellitus without complication, without long-term current use of insulin  Recent A1c 5.7, well-controlled likely due to advancing CKD. Continue home Jardiance for CHF. No indication for insulin at this time, as to avoid hypoglycemia in advanced CKD.    Hyperlipidemia associated with type 2 diabetes mellitus  Continue hospital formulary of home statin.     Anemia of chronic disease  Stable, and around baseline without indication for transfusion. Monitor.     Paroxysmal A-fib  Patient has persistent (7 days or more) atrial fibrillation. [id number] Score: 3. The patients heart rate in the last 24 hours is as follows:  Pulse  Min: 82  Max: 93     Antiarrhythmics  amiodarone tablet 200 mg, Daily, Oral    Anticoagulants  apixaban tablet 5 mg, 2 times daily, Oral    Plan  - Replete lytes with a goal of K>4, Mg >2  - Patient is anticoagulated, see medications listed above.  - Patient's afib is currently controlled  - Holding home BB given dobutamine ggt  - Continue home amiodarone & apixaban      Pulmonary HTN  Continue diuresis with goal of euvolemia.    CKD (chronic kidney disease), stage IV  Renal function remains around baseline; Estimated Creatinine Clearance: 22.1 mL/min (A) (based on SCr of 3 mg/dL (H)). according to latest data. Based on current GFR, CKD stage is stage 4 - GFR 15-29. Will avoid nephrotoxic agents as able, and renally dose all meds as applicable.    -Cr high but still below baseline  -Monitor closely during diuresis    Chronic prescription benzodiazepine use  Verified by PDMP. Continue reduced dose of home regimen as to not precipitate withdrawal, however cautiously as this is likely not an optimal medication for this patient with multiple advanced comorbidities and chronic respiratory failure.    Chronic respiratory failure with hypoxia  Reports using home oxygen, likely with chronic intermittent respiratory failure due to restrictive lung disease on PFTs and advanced CHF. Wean oxygen to maintain sats 90% or greater.     Paroxysmal supraventricular tachycardia  Continue Amiodarone (started during recent hospitalization in [place]). Monitor on telemetry.       Depression due to physical illness  -Previously on zoloft, pt apparently self-discontinued  -Will involve psychiatry team at this time        MELISSA (obstructive sleep apnea)  Non-compliant with CPAP. Monitor.     Essential hypertension  Chronic, and currently controlled. Latest blood pressure and vitals reviewed-   While in the hospital, will manage blood pressure as follows  - Imdur 30mg daily  - Diuresis as above    Will utilize p.r.n. blood pressure medication only if patient's blood pressure greater than 180/110 and she develops symptoms such as worsening chest pain or shortness of breath.      VTE Risk Mitigation (From admission, onward)           Ordered     apixaban tablet 5 mg  2 times daily         11/19/24 1214     Reason for No Pharmacological VTE Prophylaxis  Once        Question:  Reasons:  Answer:  Already  adequately anticoagulated on oral Anticoagulants    11/18/24 1904     IP VTE HIGH RISK PATIENT  Once         11/18/24 1904     Place sequential compression device  Until discontinued         11/18/24 1904                    Discharge Planning   CHIQUI: 11/25/2024     Code Status: Full Code   Is the patient medically ready for discharge?:     Reason for patient still in hospital (select all that apply): Patient trending condition  Discharge Plan A: Home with family, Other (home infusion)                  Peri Pastor MD  Department of Hospital Medicine   [facility] - Cardiology Stepdown

## 2024-11-22 NOTE — ASSESSMENT & PLAN NOTE
Chronic, and currently controlled. Latest blood pressure and vitals reviewed-   While in the hospital, will manage blood pressure as follows  - Imdur 30mg daily  - Diuresis as above    Will utilize p.r.n. blood pressure medication only if patient's blood pressure greater than 180/110 and she develops symptoms such as worsening chest pain or shortness of breath.

## 2024-11-22 NOTE — SUBJECTIVE & OBJECTIVE
Interval History: NAEON, VSS, creatinine improved to 3 from 3.1 yesterday. Urine output 2.5 L yesterday and has -140 cc since this morning. Complains of some cough     ROS  Objective:     Vital Signs (Most Recent):  Temp: 98.6 °F (37 °C) (11/22/24 0743)  Pulse: 83 (11/22/24 1111)  Resp: 18 (11/22/24 0824)  BP: (!) 141/85 (11/22/24 0743)  SpO2: 98 % (11/22/24 1000) Vital Signs (24h Range):  Temp:  [97.6 °F (36.4 °C)-98.6 °F (37 °C)] 98.6 °F (37 °C)  Pulse:  [82-93] 83  Resp:  [16-19] 18  SpO2:  [92 %-98 %] 98 %  BP: (110-141)/(69-85) 141/85     Weight: 84 kg (185 lb 3 oz)  Body mass index is 29.89 kg/m².     SpO2: 98 %         Intake/Output Summary (Last 24 hours) at 11/22/2024 1132  Last data filed at 11/22/2024 0927  Gross per 24 hour   Intake 1420 ml   Output 3080 ml   Net -1660 ml       Lines/Drains/Airways       Peripherally Inserted Central Catheter Line  Duration             PICC Double Lumen 05/12/23 1534 right brachial 559 days                       Physical Exam     Constitutional:       Appearance: Normal appearance. She is obese.   Eyes:      Pupils: Pupils are equal, round, and reactive to light.   Neck:      Comments: JVD present   Cardiovascular:      Rate and Rhythm: Normal rate and regular rhythm.      Pulses: Normal pulses.      Heart sounds: Murmur heard.   Pulmonary:      Effort: Pulmonary effort is normal.      Breath sounds: Rales present.   Abdominal:      General: Abdomen is flat.      Palpations: Abdomen is soft.   Musculoskeletal:      Cervical back: Normal range of motion.      Right lower leg: Edema present.      Left lower leg: Edema present.   Neurological:      General: No focal deficit present.      Mental Status: She is alert and oriented to person, place, and time.   Psychiatric:         Mood and Affect: Mood normal.         Behavior: Behavior normal.     Significant Labs: All pertinent lab results from the last 24 hours have been reviewed.    Significant Imaging: Echocardiogram:  Transthoracic echo (TTE) complete (Cupid Only):   Results for orders placed or performed during the hospital encounter of 11/18/24   Echo   Result Value Ref Range    LV Diastolic Volume 227.02 mL    Echo EF Estimated 21 %    LV Systolic Volume 179.58 mL    IVS 1.1 0.6 - 1.1 cm    LVIDd 6.6 (A) 3.5 - 6.0 cm    LVIDs 6.0 (A) 2.1 - 4.0 cm    LVOT diameter 2.0 cm    PW 1.2 (A) 0.6 - 1.1 cm    AV LVOT peak gradient 2 mmHg    LVOT mn grad 1 mmHg    LVOT peak marcos 0.7 m/s    LVOT peak VTI 12.7 cm    RV- carrillo basal diam 6.6 cm    RV S' 10.22 cm/s    LA size 5.50 cm    Left Atrium Major Axis 7.04 cm    Left Atrium Minor Axis 7.01 cm    LA Vol (MOD) 148.46 mL    RA Major Axis 6.92 cm    RA Area 32.2 cm2    AV valve area 2.2 cm2    AV area by cont VTI 2.1 cm2    AV peak gradient 4.8 mmHg    AV mean gradient 2.3 mmHg    Ao peak marcos 1.1 m/s    Ao VTI 18.2 cm    MV Peak A Marcos 0.27 m/s    E wave deceleration time 185.89 ms    MV Peak E Marcos 0.48 m/s    E/A ratio 1.78     LV LATERAL E/E' RATIO 6.00     LV SEPTAL E/E' RATIO 9.60     TDI LATERAL 0.08 m/s    TDI SEPTAL 0.05 m/s    TV peak gradient 42 mmHg    TR Max Marcos 3.24 m/s    Ascending aorta 3.77 cm    STJ 2.39 cm    IVC diameter 1.99 cm    Sinus 3.35 cm    LA WIDTH 4.90 cm    RA Width 4.52 cm    TAPSE 1.37 cm    BSA 2.01 m2    LVOT stroke volume 39.9 cm3    LV Systolic Volume Index 91.2 mL/m2    LV Diastolic Volume Index 115.24 mL/m2    LVOT area 3.1 cm2    FS 9.1 (A) 28 - 44 %    Left Ventricle Relative Wall Thickness 0.36 cm    LV mass 347.9 g    LV Mass Index 176.6 g/m2    E/E' ratio 7.38 m/s    JEREL 81.7 mL/m2    LA Vol 160.92 cm3    RV/LV Ratio 1.00 cm    JEREL (MOD) 75.4 mL/m2    AV Velocity Ratio 0.64     AV index (prosthetic) 0.70     MAMADOU by Velocity Ratio 2.0 cm²    Triscuspid Valve Regurgitation Peak Gradient 42 mmHg    Mean e' 0.07 m/s    ZLVIDS 4.31     ZLVIDD 1.53     TV resting pulmonary artery pressure 57 mmHg    RV TB RVSP 18 mmHg    Est. RA pres 15 mmHg     Narrative      Left Ventricle: The left ventricle is severely dilated. Normal wall   thickness but prominent trabeculations are seen in the apex. There is   eccentric hypertrophy. Severe global hypokinesis present. There is   severely reduced systolic function with a visually estimated ejection   fraction of 15 - 20%. Grade II diastolic dysfunction.    Right Ventricle: Severe right ventricular enlargement. Wall thickness   is normal. Systolic function is moderately reduced. Pacemaker lead present   in the ventricle.    Severe biatrial enlargement    Aortic Valve: The aortic valve is a trileaflet valve. There is mild   aortic valve sclerosis. Mildly restricted motion.    Mitral Valve: There is moderate regurgitation.    Tricuspid Valve: There is mild to moderate regurgitation.    Pulmonary Artery: There is moderate pulmonary hypertension. The   estimated pulmonary artery systolic pressure is 57 mmHg.    IVC/SVC: Elevated venous pressure at 15 mmHg.    There is a small posterior pericardial effusion. Ascites is also   present.      and EKG:

## 2024-11-22 NOTE — SUBJECTIVE & OBJECTIVE
Interval History: NAEON. Continues to diurese well w/ 3.5L UOP, net -2.5L, & down 1.9kg in last 24hrs. Urine cx w/o growth & pt w/o urinary sx. Jardiance started. Pt reports continued mild cough with sinus congestion. Mucolytics, nasal spray & antihistamine added. Cardiology following- continue with current cardiac package for today, and likely downtitrate lasix ggt tmrw. Palliative following; pt declines hospice at this time. Pt also reports recent rash to lateral aspect of ankles bilaterally w/ assoc burning pain. Also w/ very dry, flaky skin to pedal surface of feet, but pain/burning only localized to lateral ankles. Wound care & Derm consulted.     Review of Systems  Objective:     Vital Signs (Most Recent):  Temp: 97.6 °F (36.4 °C) (11/22/24 1143)  Pulse: 88 (11/22/24 1143)  Resp: 18 (11/22/24 1143)  BP: 124/75 (11/22/24 1143)  SpO2: 99 % (11/22/24 1143) Vital Signs (24h Range):  Temp:  [97.6 °F (36.4 °C)-98.6 °F (37 °C)] 97.6 °F (36.4 °C)  Pulse:  [82-93] 88  Resp:  [16-18] 18  SpO2:  [94 %-99 %] 99 %  BP: (110-141)/(69-85) 124/75     Weight: 84 kg (185 lb 3 oz)  Body mass index is 29.89 kg/m².    Intake/Output Summary (Last 24 hours) at 11/22/2024 1450  Last data filed at 11/22/2024 0927  Gross per 24 hour   Intake 1180 ml   Output 3080 ml   Net -1900 ml         Physical Exam  Constitutional:       General: She is not in acute distress.     Appearance: She is well-developed. She is ill-appearing (chronically). She is not toxic-appearing or diaphoretic.   HENT:      Head: Normocephalic and atraumatic.      Mouth/Throat:      Mouth: Mucous membranes are moist.   Eyes:      Conjunctiva/sclera: Conjunctivae normal.   Cardiovascular:      Rate and Rhythm: Normal rate. Rhythm irregular.      Heart sounds: Murmur heard.   Pulmonary:      Effort: Pulmonary effort is normal. No respiratory distress.      Breath sounds: Rales (rare bibasilar crackles) present.   Abdominal:      General: Bowel sounds are normal.       Palpations: Abdomen is soft.      Tenderness: There is no abdominal tenderness. There is no guarding.   Musculoskeletal:      Right lower le+ Pitting Edema present.      Left lower le+ Pitting Edema present.   Skin:     Coloration: Skin is not jaundiced or pale.      Findings: Rash present.   Neurological:      General: No focal deficit present.      Mental Status: She is alert and oriented to person, place, and time.   Psychiatric:         Mood and Affect: Mood normal.         Behavior: Behavior normal.                    Significant Labs: All pertinent labs within the past 24 hours have been reviewed.    Significant Imaging: I have reviewed all pertinent imaging results/findings within the past 24 hours.

## 2024-11-22 NOTE — ASSESSMENT & PLAN NOTE
Renal function remains around baseline; Estimated Creatinine Clearance: 22.1 mL/min (A) (based on SCr of 3 mg/dL (H)). according to latest data. Based on current GFR, CKD stage is stage 4 - GFR 15-29. Will avoid nephrotoxic agents as able, and renally dose all meds as applicable.    -Cr high but still below baseline  -Monitor closely during diuresis

## 2024-11-22 NOTE — ASSESSMENT & PLAN NOTE
Acute on chronic systolic HF, NYHA class III, stage D. On home dobutamine 5 mcg/kg/min. Etiology: NICM.  Hemodynamic status: warm, normotensive, severely hypervolemic.Patient not in cardiogenic shock given above profile and negative lactate      Plan:  --Continue IV lasix 30 mg /hr   --Continue Jardiance 10 mg  -Continue dobutamine 5mcg/kg/min  -- Continue to monitor urine output.  -- Monitor electrolytes closely. Maintain Mg >2 and K >4, check BMP q8h today after lasix bolus and increase in lasix drip and thereafter check BMP q12h.  -- HF Pathway: Sodium restriction <2 g, Fluid restriction < 1500 mL, Strict I/Os, Daily weights

## 2024-11-22 NOTE — ASSESSMENT & PLAN NOTE
-Patient with known severe CHF with last EF 10-15% and Grade 2 DD on TTE 5/2024, on chronic dobutamine gtt. Followed by advanced HF clinic, and has been considered for transplant however declined by 2 facilities. Presents with signs of volume overload on exam and worsening dyspnes, consistent with CHF exacerbation.   -TTE redemonstrating reduced EF although slighlty higher 15-20%. CVP 15mmHg  Plan:  -cardiology on board, appreciate assistance  -continue Lasix ggt, titrate as indicated  -Monitor UOP closely  - Will continue home dobutamine as well. Continue GDMT as tolerated, although limited chronically due to advanced CKD and softer BPs.  - Unable to add BB 2/2 dobutamine.   - Jardiance added   -Will obtain daily weights and monitor renal function and electrolytes on serial labs. Strict intake and output along with fluid restricted diet. Monitor on telemetry.

## 2024-11-22 NOTE — PLAN OF CARE
CRESCENCIO faxed cardiology referral to [facility] who will call patient to schedule.    CRESCENCIO Gee  [phone]

## 2024-11-22 NOTE — ASSESSMENT & PLAN NOTE
Patient has persistent (7 days or more) atrial fibrillation. [id number] Score: 3. The patients heart rate in the last 24 hours is as follows:  Pulse  Min: 82  Max: 93     Antiarrhythmics  amiodarone tablet 200 mg, Daily, Oral    Anticoagulants  apixaban tablet 5 mg, 2 times daily, Oral    Plan  - Replete lytes with a goal of K>4, Mg >2  - Patient is anticoagulated, see medications listed above.  - Patient's afib is currently controlled  - Holding home BB given dobutamine ggt  - Continue home amiodarone & apixaban

## 2024-11-22 NOTE — PROGRESS NOTES
[facility]  Cardiology  Progress Note    Patient Name: Hafsa Hawley  MRN: [id number]  Admission Date: 11/18/2024  Hospital Length of Stay: 4 days  Code Status: Full Code   Attending Physician: Peri Pastor MD   Primary Care Physician: Cristobal Ann MD  Expected Discharge Date: 11/24/2024  Principal Problem:Acute on chronic combined systolic and diastolic heart failure    Subjective:     Hospital Course:   No notes on file    Interval History: NAEON, VSS, creatinine improved to 3 from 3.1 yesterday. Urine output 2.5 L yesterday and has -140 cc since this morning. Complains of some cough     ROS  Objective:     Vital Signs (Most Recent):  Temp: 98.6 °F (37 °C) (11/22/24 0743)  Pulse: 83 (11/22/24 1111)  Resp: 18 (11/22/24 0824)  BP: (!) 141/85 (11/22/24 0743)  SpO2: 98 % (11/22/24 1000) Vital Signs (24h Range):  Temp:  [97.6 °F (36.4 °C)-98.6 °F (37 °C)] 98.6 °F (37 °C)  Pulse:  [82-93] 83  Resp:  [16-19] 18  SpO2:  [92 %-98 %] 98 %  BP: (110-141)/(69-85) 141/85     Weight: 84 kg (185 lb 3 oz)  Body mass index is 29.89 kg/m².     SpO2: 98 %         Intake/Output Summary (Last 24 hours) at 11/22/2024 1132  Last data filed at 11/22/2024 0927  Gross per 24 hour   Intake 1420 ml   Output 3080 ml   Net -1660 ml       Lines/Drains/Airways       Peripherally Inserted Central Catheter Line  Duration             PICC Double Lumen 05/12/23 1534 right brachial 559 days                       Physical Exam     Constitutional:       Appearance: Normal appearance. She is obese.   Eyes:      Pupils: Pupils are equal, round, and reactive to light.   Neck:      Comments: JVD present   Cardiovascular:      Rate and Rhythm: Normal rate and regular rhythm.      Pulses: Normal pulses.      Heart sounds: Murmur heard.   Pulmonary:      Effort: Pulmonary effort is normal.      Breath sounds: Rales present.   Abdominal:      General: Abdomen is flat.      Palpations: Abdomen is soft.   Musculoskeletal:       Cervical back: Normal range of motion.      Right lower leg: Edema present.      Left lower leg: Edema present.   Neurological:      General: No focal deficit present.      Mental Status: She is alert and oriented to person, place, and time.   Psychiatric:         Mood and Affect: Mood normal.         Behavior: Behavior normal.     Significant Labs: All pertinent lab results from the last 24 hours have been reviewed.    Significant Imaging: Echocardiogram: Transthoracic echo (TTE) complete (Cupid Only):   Results for orders placed or performed during the hospital encounter of 11/18/24   Echo   Result Value Ref Range    LV Diastolic Volume 227.02 mL    Echo EF Estimated 21 %    LV Systolic Volume 179.58 mL    IVS 1.1 0.6 - 1.1 cm    LVIDd 6.6 (A) 3.5 - 6.0 cm    LVIDs 6.0 (A) 2.1 - 4.0 cm    LVOT diameter 2.0 cm    PW 1.2 (A) 0.6 - 1.1 cm    AV LVOT peak gradient 2 mmHg    LVOT mn grad 1 mmHg    LVOT peak marcos 0.7 m/s    LVOT peak VTI 12.7 cm    RV- carrillo basal diam 6.6 cm    RV S' 10.22 cm/s    LA size 5.50 cm    Left Atrium Major Axis 7.04 cm    Left Atrium Minor Axis 7.01 cm    LA Vol (MOD) 148.46 mL    RA Major Axis 6.92 cm    RA Area 32.2 cm2    AV valve area 2.2 cm2    AV area by cont VTI 2.1 cm2    AV peak gradient 4.8 mmHg    AV mean gradient 2.3 mmHg    Ao peak marcos 1.1 m/s    Ao VTI 18.2 cm    MV Peak A Marcos 0.27 m/s    E wave deceleration time 185.89 ms    MV Peak E Marcos 0.48 m/s    E/A ratio 1.78     LV LATERAL E/E' RATIO 6.00     LV SEPTAL E/E' RATIO 9.60     TDI LATERAL 0.08 m/s    TDI SEPTAL 0.05 m/s    TV peak gradient 42 mmHg    TR Max Marcos 3.24 m/s    Ascending aorta 3.77 cm    STJ 2.39 cm    IVC diameter 1.99 cm    Sinus 3.35 cm    LA WIDTH 4.90 cm    RA Width 4.52 cm    TAPSE 1.37 cm    BSA 2.01 m2    LVOT stroke volume 39.9 cm3    LV Systolic Volume Index 91.2 mL/m2    LV Diastolic Volume Index 115.24 mL/m2    LVOT area 3.1 cm2    FS 9.1 (A) 28 - 44 %    Left Ventricle Relative Wall Thickness 0.36 cm     LV mass 347.9 g    LV Mass Index 176.6 g/m2    E/E' ratio 7.38 m/s    JEREL 81.7 mL/m2    LA Vol 160.92 cm3    RV/LV Ratio 1.00 cm    JEREL (MOD) 75.4 mL/m2    AV Velocity Ratio 0.64     AV index (prosthetic) 0.70     MAMADOU by Velocity Ratio 2.0 cm²    Triscuspid Valve Regurgitation Peak Gradient 42 mmHg    Mean e' 0.07 m/s    ZLVIDS 4.31     ZLVIDD 1.53     TV resting pulmonary artery pressure 57 mmHg    RV TB RVSP 18 mmHg    Est. RA pres 15 mmHg    Narrative      Left Ventricle: The left ventricle is severely dilated. Normal wall   thickness but prominent trabeculations are seen in the apex. There is   eccentric hypertrophy. Severe global hypokinesis present. There is   severely reduced systolic function with a visually estimated ejection   fraction of 15 - 20%. Grade II diastolic dysfunction.    Right Ventricle: Severe right ventricular enlargement. Wall thickness   is normal. Systolic function is moderately reduced. Pacemaker lead present   in the ventricle.    Severe biatrial enlargement    Aortic Valve: The aortic valve is a trileaflet valve. There is mild   aortic valve sclerosis. Mildly restricted motion.    Mitral Valve: There is moderate regurgitation.    Tricuspid Valve: There is mild to moderate regurgitation.    Pulmonary Artery: There is moderate pulmonary hypertension. The   estimated pulmonary artery systolic pressure is 57 mmHg.    IVC/SVC: Elevated venous pressure at 15 mmHg.    There is a small posterior pericardial effusion. Ascites is also   present.      and EKG:   Assessment and Plan:     Brief HPI:     Chandan is a 59 y.o. female with severe combined CHF (EF 10-15%) with ICD in place, AFib and VTach, CKD 4, T2DM not on insulin, restrictive lung disease, MELISSA, anemia, chronic pain on opioids, chronic prescription benzodiazepine use who presents from heart failure clinic for CHF exacerbation. She is on palliative dobutamine and not a candidate for advanced options.     * Acute on chronic combined  systolic and diastolic heart failure  Acute on chronic systolic HF, NYHA class III, stage D. On home dobutamine 5 mcg/kg/min. Etiology: NICM.  Hemodynamic status: warm, normotensive, severely hypervolemic.Patient not in cardiogenic shock given above profile and negative lactate      Plan:  --Continue IV lasix 30 mg /hr   --Continue Jardiance 10 mg  -Continue dobutamine 5mcg/kg/min  -- Continue to monitor urine output.  -- Monitor electrolytes closely. Maintain Mg >2 and K >4, check BMP q8h today after lasix bolus and increase in lasix drip and thereafter check BMP q12h.  -- HF Pathway: Sodium restriction <2 g, Fluid restriction < 1500 mL, Strict I/Os, Daily weights         VTE Risk Mitigation (From admission, onward)           Ordered     apixaban tablet 5 mg  2 times daily         11/19/24 1214     Reason for No Pharmacological VTE Prophylaxis  Once        Question:  Reasons:  Answer:  Already adequately anticoagulated on oral Anticoagulants    11/18/24 1904     IP VTE HIGH RISK PATIENT  Once         11/18/24 1904     Place sequential compression device  Until discontinued         11/18/24 1904                    Mary Reyes MD  Cardiology  [facility]

## 2024-11-22 NOTE — PLAN OF CARE
Alert and oriented, denies pain, cough med prn given, no s/s of distress, comfort measures provided, plan of care explained to patient, VS WNL, call light within reach, will continue to monitor.    Problem: Adult Inpatient Plan of Care  Goal: Plan of Care Review  Outcome: Progressing  Goal: Absence of Hospital-Acquired Illness or Injury  Outcome: Progressing  Goal: Optimal Comfort and Wellbeing  Outcome: Progressing  Goal: Readiness for Transition of Care  Outcome: Progressing     Problem: Heart Failure  Goal: Fluid and Electrolyte Balance  Outcome: Progressing

## 2024-11-23 PROBLEM — B35.3 TINEA PEDIS OF BOTH FEET: Chronic | Status: ACTIVE | Noted: 2024-11-23

## 2024-11-23 PROBLEM — L28.2 PRURIGO: Chronic | Status: ACTIVE | Noted: 2024-11-23

## 2024-11-23 LAB
ALBUMIN SERPL BCP-MCNC: 3.3 G/DL (ref 3.5–5.2)
ANION GAP SERPL CALC-SCNC: 14 MMOL/L (ref 8–16)
BUN SERPL-MCNC: 96 MG/DL (ref 6–20)
CALCIUM SERPL-MCNC: 9.3 MG/DL (ref 8.7–10.5)
CHLORIDE SERPL-SCNC: 97 MMOL/L (ref 95–110)
CO2 SERPL-SCNC: 32 MMOL/L (ref 23–29)
CREAT SERPL-MCNC: 3.1 MG/DL (ref 0.5–1.4)
ERYTHROCYTE [DISTWIDTH] IN BLOOD BY AUTOMATED COUNT: 35 % (ref 11.5–14.5)
EST. GFR  (NO RACE VARIABLE): 16.7 ML/MIN/1.73 M^2
GLUCOSE SERPL-MCNC: 89 MG/DL (ref 70–110)
HCT VFR BLD AUTO: 32.7 % (ref 37–48.5)
HGB BLD-MCNC: 9.5 G/DL (ref 12–16)
MAGNESIUM SERPL-MCNC: 2.3 MG/DL (ref 1.6–2.6)
MCH RBC QN AUTO: 19.3 PG (ref 27–31)
MCHC RBC AUTO-ENTMCNC: 29.1 G/DL (ref 32–36)
MCV RBC AUTO: 67 FL (ref 82–98)
PHOSPHATE SERPL-MCNC: 4.5 MG/DL (ref 2.7–4.5)
PLATELET # BLD AUTO: 121 K/UL (ref 150–450)
PMV BLD AUTO: ABNORMAL FL (ref 9.2–12.9)
POTASSIUM SERPL-SCNC: 3.8 MMOL/L (ref 3.5–5.1)
RBC # BLD AUTO: 4.91 M/UL (ref 4–5.4)
SODIUM SERPL-SCNC: 143 MMOL/L (ref 136–145)
WBC # BLD AUTO: 4.32 K/UL (ref 3.9–12.7)

## 2024-11-23 PROCEDURE — 25000003 PHARM REV CODE 250: Performed by: STUDENT IN AN ORGANIZED HEALTH CARE EDUCATION/TRAINING PROGRAM

## 2024-11-23 PROCEDURE — 25000242 PHARM REV CODE 250 ALT 637 W/ HCPCS: Performed by: STUDENT IN AN ORGANIZED HEALTH CARE EDUCATION/TRAINING PROGRAM

## 2024-11-23 PROCEDURE — 25000003 PHARM REV CODE 250: Performed by: NURSE PRACTITIONER

## 2024-11-23 PROCEDURE — 80069 RENAL FUNCTION PANEL: CPT | Performed by: STUDENT IN AN ORGANIZED HEALTH CARE EDUCATION/TRAINING PROGRAM

## 2024-11-23 PROCEDURE — 99900035 HC TECH TIME PER 15 MIN (STAT)

## 2024-11-23 PROCEDURE — 94761 N-INVAS EAR/PLS OXIMETRY MLT: CPT

## 2024-11-23 PROCEDURE — 27000221 HC OXYGEN, UP TO 24 HOURS

## 2024-11-23 PROCEDURE — 94640 AIRWAY INHALATION TREATMENT: CPT

## 2024-11-23 PROCEDURE — 83735 ASSAY OF MAGNESIUM: CPT | Performed by: STUDENT IN AN ORGANIZED HEALTH CARE EDUCATION/TRAINING PROGRAM

## 2024-11-23 PROCEDURE — 63600175 PHARM REV CODE 636 W HCPCS: Performed by: STUDENT IN AN ORGANIZED HEALTH CARE EDUCATION/TRAINING PROGRAM

## 2024-11-23 PROCEDURE — 20600001 HC STEP DOWN PRIVATE ROOM

## 2024-11-23 PROCEDURE — 85027 COMPLETE CBC AUTOMATED: CPT | Performed by: STUDENT IN AN ORGANIZED HEALTH CARE EDUCATION/TRAINING PROGRAM

## 2024-11-23 RX ORDER — DOXYLAMINE SUCCINATE 25 MG
TABLET ORAL 2 TIMES DAILY
Status: DISCONTINUED | OUTPATIENT
Start: 2024-11-23 | End: 2024-11-25 | Stop reason: HOSPADM

## 2024-11-23 RX ADMIN — FERROUS SULFATE TAB EC 325 MG (65 MG FE EQUIVALENT) 1 EACH: 325 (65 FE) TABLET DELAYED RESPONSE at 08:11

## 2024-11-23 RX ADMIN — APIXABAN 5 MG: 5 TABLET, FILM COATED ORAL at 08:11

## 2024-11-23 RX ADMIN — SEVELAMER CARBONATE 1600 MG: 800 TABLET, FILM COATED ORAL at 04:11

## 2024-11-23 RX ADMIN — MUPIROCIN: 20 OINTMENT TOPICAL at 08:11

## 2024-11-23 RX ADMIN — MUPIROCIN: 20 OINTMENT TOPICAL at 09:11

## 2024-11-23 RX ADMIN — FLUTICASONE PROPIONATE 100 MCG: 50 SPRAY, METERED NASAL at 08:11

## 2024-11-23 RX ADMIN — EMPAGLIFLOZIN 10 MG: 10 TABLET, FILM COATED ORAL at 08:11

## 2024-11-23 RX ADMIN — GUAIFENESIN 600 MG: 600 TABLET, EXTENDED RELEASE ORAL at 09:11

## 2024-11-23 RX ADMIN — CETIRIZINE HYDROCHLORIDE 10 MG: 10 TABLET, FILM COATED ORAL at 08:11

## 2024-11-23 RX ADMIN — TIOTROPIUM BROMIDE INHALATION SPRAY 2 PUFF: 3.12 SPRAY, METERED RESPIRATORY (INHALATION) at 08:11

## 2024-11-23 RX ADMIN — POTASSIUM BICARBONATE 30 MEQ: 391 TABLET, EFFERVESCENT ORAL at 08:11

## 2024-11-23 RX ADMIN — ATORVASTATIN CALCIUM 40 MG: 20 TABLET, FILM COATED ORAL at 09:11

## 2024-11-23 RX ADMIN — ASPIRIN 81 MG: 81 TABLET, COATED ORAL at 08:11

## 2024-11-23 RX ADMIN — ACETAMINOPHEN 650 MG: 325 TABLET ORAL at 09:11

## 2024-11-23 RX ADMIN — ISOSORBIDE MONONITRATE 30 MG: 30 TABLET, EXTENDED RELEASE ORAL at 08:11

## 2024-11-23 RX ADMIN — MICONAZOLE NITRATE: 20 CREAM TOPICAL at 11:11

## 2024-11-23 RX ADMIN — IPRATROPIUM BROMIDE AND ALBUTEROL SULFATE 3 ML: 2.5; .5 SOLUTION RESPIRATORY (INHALATION) at 12:11

## 2024-11-23 RX ADMIN — SEVELAMER CARBONATE 1600 MG: 800 TABLET, FILM COATED ORAL at 11:11

## 2024-11-23 RX ADMIN — SEVELAMER CARBONATE 1600 MG: 800 TABLET, FILM COATED ORAL at 08:11

## 2024-11-23 RX ADMIN — PREGABALIN 50 MG: 25 CAPSULE ORAL at 09:11

## 2024-11-23 RX ADMIN — APIXABAN 5 MG: 5 TABLET, FILM COATED ORAL at 09:11

## 2024-11-23 RX ADMIN — GUAIFENESIN 600 MG: 600 TABLET, EXTENDED RELEASE ORAL at 08:11

## 2024-11-23 RX ADMIN — FUROSEMIDE 30 MG/HR: 10 INJECTION, SOLUTION INTRAMUSCULAR; INTRAVENOUS at 08:11

## 2024-11-23 RX ADMIN — AMIODARONE HYDROCHLORIDE 200 MG: 200 TABLET ORAL at 08:11

## 2024-11-23 NOTE — ASSESSMENT & PLAN NOTE
- KOH collected at bedside positive for multiple branching hyphae   - start terbinafine 1% cream BID to affected areas on bilateral feet  - plan for outpatient follow-up in dermatology clinic

## 2024-11-23 NOTE — PLAN OF CARE
Pt educated on fall risk and remained free from falls/trauma/injury. Denies chest pain, SOB, palpitations, dizziness, pain, or discomfort. Plan of care reviewed with pt, all questions answered. Bed locked in lowest position, call bell within reach, no acute distress noted, will continue to monitor. VSS, afebrile.     Problem: Heart Failure  Goal: Optimal Coping  Outcome: Progressing  Goal: Optimal Cardiac Output  Outcome: Progressing  Goal: Stable Heart Rate and Rhythm  Outcome: Progressing  Goal: Optimal Functional Ability  Outcome: Progressing  Goal: Fluid and Electrolyte Balance  Outcome: Progressing  Goal: Improved Oral Intake  Outcome: Progressing  Goal: Effective Oxygenation and Ventilation  Outcome: Progressing  Goal: Effective Breathing Pattern During Sleep  Outcome: Progressing     Problem: Fall Injury Risk  Goal: Absence of Fall and Fall-Related Injury  Outcome: Progressing

## 2024-11-23 NOTE — CONSULTS
"[facility]  Dermatology  Consult Note    Patient Name: Hafsa Hawley  MRN: [id number]  Admission Date: 11/18/2024  Hospital Length of Stay: 5 days  Attending Physician: Peri Pastor MD  Primary Care Provider: Cristobal Ann MD     Inpatient consult to Dermatology  Consult performed by: Constance Andrade MD  Consult ordered by: Peri Pastor MD        Subjective:     Principal Problem:Acute on chronic combined systolic and diastolic heart failure    HPI:  Ms. Hawley is a 60yo F with history of severe combined CHF (EF 10-15%) with ICD in place, AFib and VTach, CKD 4, T2DM not on insulin, restrictive lung disease, anemia, chronic pain on opioids, chronic prescription benzodiazepine use who is admitted 11/18/24 from heart failure clinic for CHF exacerbation. Dermatology has been consulted for "painful burning rash to lateral ankles bilaterally".     Patient reports rash started 1 month ago on her feet and has been progressing. Rash appeared as scaling on the feet and ankle. Sometimes itches and sometimes burns. Denies any other areas of involvement on the body. Patient has not tried any treatments for the rash. No similar rash in the past.   Additionally bumps on the legs noted, corresponding to areas where patient reports she picks at out of habit.     Past Medical History:   Diagnosis Date    Allergy     Anemia     Arthritis     Atrial fibrillation     OAC    BMI 32.0-32.9,adult 02/22/2023    Chronic respiratory failure with hypoxia, on home oxygen therapy     2L with activity, off at rest.  Per Pulm  no overt evidence of ILD or COPD on PFTs and CT to explain O2 needs.    CKD (chronic kidney disease), stage IV 05/08/2018    Congestive heart failure     s/p AICD placement,    Deep vein thrombosis     Depression     elevated bilirubin d/t Gilbert's syndrome     confirmed by Rickreall genetic testing, evaluated by hepatology    Encounter for blood transfusion     GERD (gastroesophageal reflux " disease)     Hypertension     Pheochromocytoma, malignant     Right kidney mass     Sleep apnea     Thalassemia trait, alpha     Thyroid disease     Type 2 diabetes mellitus with hyperglycemia, without long-term current use of insulin 08/13/2020       Past Surgical History:   Procedure Laterality Date    ANGIOGRAM, ABDOMINAL AORTA Right 04/15/2024    APPENDECTOMY      BONE MARROW BIOPSY      CARDIAC DEFIBRILLATOR PLACEMENT Left 12/2016    CARDIAC ELECTROPHYSIOLOGY MAPPING AND ABLATION      CARDIAC ELECTROPHYSIOLOGY MAPPING AND ABLATION      COLONOSCOPY N/A 05/06/2022    Procedure: COLONOSCOPY;  Surgeon: Areyl Betancourt MD;  Location: [facility] ENDO (2ND FLR);  Service: Endoscopy;  Laterality: N/A;  heart transplant candidate/ EF 25% - 2nd floor/ defib - Biotronik - ER[facility]  Eliquis - per Dr. Cortez with [facility], Pt ok to hold Eliquis x 2 days prior-see media tab-outside correspondence dated 12/30/21  - [facility]  verbal instructions/portal instructions/email instructions - s    EYE SURGERY      due to running tears    FRACTURE SURGERY Left     hand 5th digit    HYSTERECTOMY      KNEE SURGERY Left 2016    hematoma    LIVER BIOPSY  10/24/2018    Minimal steatosis, predominantly macrovesicular, 1%, Minimal nonspecific portal inflammation, no fibrosis. No findings on biopsy to explain elevated bilirubin levels. Could be d/t Gilbert's =?- hemolysis    RIGHT HEART CATHETERIZATION Right 12/07/2021    Procedure: INSERTION, CATHETER, RIGHT HEART;  Surgeon: Irving Cardenas MD;  Location: [facility] CATH LAB;  Service: Cardiology;  Laterality: Right;    RIGHT HEART CATHETERIZATION Right 12/19/2022    Procedure: INSERTION, CATHETER, RIGHT HEART;  Surgeon: Burke Camilo MD;  Location: [facility] CATH LAB;  Service: Cardiology;  Laterality: Right;    RIGHT HEART CATHETERIZATION Right 03/29/2023    Procedure: INSERTION, CATHETER, RIGHT HEART;  Surgeon: Katie Liriano DO;  Location: [facility] CATH LAB;  Service: Cardiology;  Laterality: Right;  "   TRANSJUGULAR BIOPSY OF LIVER N/A 10/24/2018    Procedure: BIOPSY, LIVER, TRANSJUGULAR APPROACH;  Surgeon: Carmen Diagnostic Provider;  Location: [facility] OR [address] [place] [address];  Service: Radiology;  Laterality: N/A;     Family History       Problem Relation (Age of Onset)    Cancer Mother    Cirrhosis Brother    Diabetes Brother    Heart attack Father    Heart disease Father, Sister, Brother, Sister, Brother    Hypertension Father, Brother          Tobacco Use    Smoking status: Never    Smokeless tobacco: Never   Substance and Sexual Activity    Alcohol use: Not Currently     Comment: up to 1 yr ago drank 2-3 drinks on occasion but sporadic    Drug use: No    Sexual activity: Yes     Partners: Male       Review of patient's allergies indicates:   Allergen Reactions    Penicillins Hives and Other (See Comments)    Iodinated contrast media Nausea And Vomiting    Oxycodone-acetaminophen Other (See Comments)     Nausea, Dizziness, Anxiety.  "I don't like how it makes me feel."   Given Hydromorphone 0.5mg IVP  Without problems.  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 Metabolism of other drugs not listed here may also be impacted by UGT1A1 enzyme activity.       Tramadol Nausea And Vomiting and Other (See Comments)     Other reaction(s): Other (See Comments)    Valsartan Other (See Comments)       Medications:  Continuous Infusions:   DOBUTamine  5 mcg/kg/min Intravenous Continuous 6.3 " mL/hr at 11/23/24 0443 5.1345 mcg/kg/min at 11/23/24 0443    furosemide (Lasix) 500 mg in 50 mL infusion (conc: 10 mg/mL)  30 mg/hr Intravenous Continuous 3 mL/hr at 11/23/24 0834 30 mg/hr at 11/23/24 0834     Scheduled Meds:   amiodarone  200 mg Oral Daily    apixaban  5 mg Oral BID    aspirin  81 mg Oral Daily    atorvastatin  40 mg Oral QHS    cetirizine  10 mg Oral Daily    empagliflozin  10 mg Oral Daily    ferrous sulfate  1 tablet Oral Daily    fluticasone propionate  2 spray Each Nostril Daily    guaiFENesin  600 mg Oral BID    isosorbide mononitrate  30 mg Oral Daily    mupirocin   Nasal BID    sevelamer carbonate  1,600 mg Oral TID WM    tiotropium bromide  2 puff Inhalation Daily     PRN Meds:  Current Facility-Administered Medications:     acetaminophen, 650 mg, Oral, Q4H PRN    albuterol-ipratropium, 3 mL, Nebulization, Q6H PRN    ALPRAZolam, 0.5 mg, Oral, Daily PRN    aluminum-magnesium hydroxide-simethicone, 30 mL, Oral, QID PRN    benzonatate, 100 mg, Oral, TID PRN    calamine-zinc oxide 8-8%, , Topical (Top), QID PRN    glucagon (human recombinant), 1 mg, Intramuscular, PRN    glucose, 16 g, Oral, PRN    glucose, 24 g, Oral, PRN    guaiFENesin 100 mg/5 ml, 200 mg, Oral, Q4H PRN    HYDROcodone-acetaminophen, 1 tablet, Oral, Q6H PRN    melatonin, 6 mg, Oral, Nightly PRN    naloxone, 0.02 mg, Intravenous, PRN    ondansetron, 8 mg, Oral, Q8H PRN    polyethylene glycol, 17 g, Oral, Daily PRN    pregabalin, 50 mg, Oral, Nightly PRN    simethicone, 1 tablet, Oral, QID PRN    sodium chloride 0.9%, 10 mL, Intravenous, PRN    Review of Systems   Constitutional:  Negative for fever and chills.   Skin:  Positive for itching and rash.     Objective:     Vital Signs (Most Recent):  Temp: 98 °F (36.7 °C) (11/23/24 1142)  Pulse: 85 (11/23/24 1200)  Resp: 19 (11/23/24 1142)  BP: 122/82 (11/23/24 1142)  SpO2: (!) 92 % (11/23/24 1142) Vital Signs (24h Range):  Temp:  [97.4 °F (36.3 °C)-98.9 °F (37.2 °C)] 98 °F (36.7  °C)  Pulse:  [] 85  Resp:  [17-19] 19  SpO2:  [91 %-100 %] 92 %  BP: (108-127)/(62-82) 122/82     Weight: 81 kg (178 lb 9.2 oz)  Body mass index is 28.82 kg/m².     Physical Exam   Constitutional: She appears well-developed and well-nourished. No distress.   Neurological: She is alert and oriented to person, place, and time. She is not disoriented.   Psychiatric: She has a normal mood and affect.   Skin:   Areas Examined (abnormalities noted in diagram):   Head / Face Inspection Performed  Neck Inspection Performed  Chest / Axilla Inspection Performed  Abdomen Inspection Performed  Back Inspection Performed  RUE Inspected  LUE Inspection Performed  RLE Inspected  LLE Inspection Performed  Nails and Digits Inspection Performed                       Significant Labs: All pertinent labs within the past 24 hours have been reviewed.    Significant Imaging: None      Assessment/Plan:     Derm  Tinea pedis of both feet  Ms. Sales is a 58yo F with history of severe CHF (EF 10-15%) with ICD, AFib and VTach, CKD 4, T2DM not on insulin, restrictive lung disease, anemia, chronic pain on opioids, chronic prescription benzodiazepine use who is admitted 11/18/24 from heart failure clinic for CHF exacerbation. Dermatology has been consulted for 1 month history of pruritic and burning rash on the b/l feet. Exam consistent with tinea pedis, confirmed on KOH at bedside. Symptoms of pruritus and burning can be associated with this rash, may also consider a component of paresthesias from her chronic diabetic neuropathy (for which patient on pregabalin).     - KOH collected at bedside positive for multiple branching hyphae   - start terbinafine 1% cream BID to affected areas on bilateral feet  - plan for outpatient follow-up in dermatology clinic, clinic staff notified to schedule.    Prurigo  Firm papules on b/l legs most consistent with prurigo nodules 2/2 patient's reported habit of picking. May also consider a perforating  dermatosis in setting of patient's diabetes mellitus and CKD. Asymptomatic, can determine any further work-up or treatment in the outpatient setting.       Thank you for your consult. Case discussed with attending on call Dr. Lolly Vitale. Dermatology will sign off at this time. Please contact us if you have any additional questions.    Constance Andrade MD PGY-3  Dermatology  [facility]

## 2024-11-23 NOTE — NURSING
Notified MD kramer that patient had 6 beat run of v tach. Patient asymptomatic. No new orders at this time.

## 2024-11-23 NOTE — NURSING
Pt had 6 beat run of Vtach about an hour ago and now she just had a 16 beat run of Vtach. Pt is asymptomatic. LITA Saenz notified. No new orders at this time as pt is asymptomatic and will f/u with morning labs. Will continue to monitor.

## 2024-11-23 NOTE — NURSING
Notified MD Pastor. Patient had 8 beat run of vtach. Patient asymptomatic. No new orders at this time

## 2024-11-23 NOTE — HPI
"Ms. Hawley is a 60yo F with history of severe combined CHF (EF 10-15%) with ICD in place, AFib and VTach, CKD 4, T2DM not on insulin, restrictive lung disease, anemia, chronic pain on opioids, chronic prescription benzodiazepine use who is admitted 11/18/24 from heart failure clinic for CHF exacerbation. Dermatology has been consulted for "painful burning rash to lateral ankles bilaterally".     Patient reports rash started 1 month ago on her feet and has been progressing. Rash appeared as scaling on the feet and ankle. Sometimes itches and sometimes burns. Denies any other areas of involvement on the body. Patient has not tried any treatments for the rash. No similar rash in the past.   Additionally bumps on the legs noted, corresponding to areas where patient reports she picks at out of habit.   "

## 2024-11-23 NOTE — PLAN OF CARE
Problem: Adult Inpatient Plan of Care  Goal: Patient-Specific Goal (Individualized)  Outcome: Progressing  Flowsheets (Taken 11/22/2024 1805)  Individualized Care Needs:  gtt, lasix gtt.  Anxieties, Fears or Concerns: none stated     Problem: Heart Failure  Goal: Effective Oxygenation and Ventilation  Outcome: Progressing  Intervention: Promote Airway Secretion Clearance  Flowsheets (Taken 11/22/2024 1805)  Breathing Techniques/Airway Clearance: deep/controlled cough encouraged  Cough And Deep Breathing: done independently per patient  Activity Management: Ambulated in room - L4  Intervention: Optimize Oxygenation and Ventilation  Flowsheets (Taken 11/22/2024 1805)  Airway/Ventilation Management: airway patency maintained  Head of Bed (HOB) Positioning: HOB elevated     Problem: Fall Injury Risk  Goal: Absence of Fall and Fall-Related Injury  Outcome: Progressing  Intervention: Identify and Manage Contributors  Flowsheets (Taken 11/22/2024 1805)  Self-Care Promotion:   independence encouraged   BADL personal objects within reach   BADL personal routines maintained   meal set-up provided  Medication Review/Management: medications reviewed  Intervention: Promote Injury-Free Environment  Flowsheets (Taken 11/22/2024 1805)  Safety Promotion/Fall Prevention:   assistive device/personal item within reach   commode/urinal/bedpan at bedside   Fall Risk reviewed with patient/family   Fall Risk signage in place   medications reviewed   nonskid shoes/socks when out of bed   side rails raised x 2   room near unit station   patient expresses understanding of fall risk and prevention   AAOX4,VSS, Plan of care discussed with patient. Patient has no complaints of chest pain/SOB/palpitations. Pt ambulating  with standby assistance, fall precautions in place,no falls/injuries through the shift.Discussed medications and care.Patient has no questions at this time.Pt resting comfortably with no acute distress.Call light within  reach,bed at lowest position.  gtt infusing per MAR. Lasix gtt infusing per MAR. K replaced. Prn nausea medication given per MAR.

## 2024-11-23 NOTE — SUBJECTIVE & OBJECTIVE
Interval History: NAEON. Continues to diurese well w/ current lasix & dobutamine regimen: 2.4L UOP, net -0.5L, and down 3kg in last 24 hours (down 6.2 kg & -5L since admission). Pt denies SOB or chest pain. Otherwise feeling alright. Derm consulted for painful b/l ankle rash. 6 beats of VT this AM- pt asymptomatic. Continued titration of ggts & cardiac package per Cardiology who is following. Palliative following; pt declines hospice at this time. Pt reports that her bags of dobutamine had been delivered to her room rather than her home; will update pharmacy when pt ready for discharge.     Review of Systems    Objective:     Vital Signs (Most Recent):  Temp: 97.5 °F (36.4 °C) (11/23/24 0754)  Pulse: 98 (11/23/24 1053)  Resp: 18 (11/23/24 0819)  BP: 114/79 (11/23/24 0754)  SpO2: 96 % (11/23/24 0819) Vital Signs (24h Range):  Temp:  [97.4 °F (36.3 °C)-98.9 °F (37.2 °C)] 97.5 °F (36.4 °C)  Pulse:  [] 98  Resp:  [17-18] 18  SpO2:  [91 %-100 %] 96 %  BP: (108-127)/(62-81) 114/79     Weight: 81 kg (178 lb 9.2 oz)  Body mass index is 28.82 kg/m².    Intake/Output Summary (Last 24 hours) at 11/23/2024 1130  Last data filed at 11/23/2024 0851  Gross per 24 hour   Intake 1792.01 ml   Output 2150 ml   Net -357.99 ml         Physical Exam  Constitutional:       General: She is not in acute distress.     Appearance: She is well-developed. She is ill-appearing (chronically). She is not toxic-appearing or diaphoretic.   HENT:      Head: Normocephalic and atraumatic.      Mouth/Throat:      Mouth: Mucous membranes are moist.   Eyes:      Conjunctiva/sclera: Conjunctivae normal.   Cardiovascular:      Rate and Rhythm: Normal rate. Rhythm irregular.      Heart sounds: Murmur heard.   Pulmonary:      Effort: Pulmonary effort is normal. No respiratory distress.      Breath sounds: Rales (rare bibasilar crackles) present.   Abdominal:      General: Bowel sounds are normal.      Palpations: Abdomen is soft.      Tenderness: There  is no abdominal tenderness. There is no guarding.   Musculoskeletal:      Right lower le+ Pitting Edema present.      Left lower le+ Pitting Edema present.   Skin:     Coloration: Skin is not jaundiced or pale.      Findings: Rash present.   Neurological:      General: No focal deficit present.      Mental Status: She is alert and oriented to person, place, and time.   Psychiatric:         Mood and Affect: Mood normal.         Behavior: Behavior normal.                    Significant Labs: All pertinent labs within the past 24 hours have been reviewed.    Significant Imaging: I have reviewed all pertinent imaging results/findings within the past 24 hours.

## 2024-11-23 NOTE — PLAN OF CARE
Problem: Adult Inpatient Plan of Care  Goal: Patient-Specific Goal (Individualized)  Outcome: Progressing  Flowsheets (Taken 11/23/2024 1608)  Individualized Care Needs:  gtt, lasix gtt  Anxieties, Fears or Concerns: none stated     Problem: Heart Failure  Goal: Optimal Coping  Outcome: Progressing  Intervention: Support Psychosocial Response  Flowsheets (Taken 11/23/2024 1608)  Supportive Measures: active listening utilized     Problem: Fall Injury Risk  Goal: Absence of Fall and Fall-Related Injury  Outcome: Progressing  Intervention: Identify and Manage Contributors  Flowsheets (Taken 11/23/2024 1608)  Self-Care Promotion:   independence encouraged   BADL personal objects within reach   BADL personal routines maintained   meal set-up provided  Medication Review/Management: medications reviewed  Intervention: Promote Injury-Free Environment  Flowsheets (Taken 11/23/2024 1608)  Safety Promotion/Fall Prevention:   assistive device/personal item within reach   commode/urinal/bedpan at bedside   Fall Risk reviewed with patient/family   Fall Risk signage in place   medications reviewed   instructed to call staff for mobility   nonskid shoes/socks when out of bed   side rails raised x 2   room near unit station   patient expresses understanding of fall risk and prevention   AAOX4,VSS,O2 sats > 90% on 2L NC. Plan of care discussed with patient. Patient has no complaints of chest pain/SOB/palpitations. Pt ambulating  with standby assist, fall precautions in place,no falls/injuries through the shift.Discussed medications and care.Patient has no questions at this time.Pt resting comfortably with no acute distress.Call light within reach,bed at lowest position.  gtt infusing per MAR. Lasix gtt infusing per MAR.

## 2024-11-23 NOTE — SUBJECTIVE & OBJECTIVE
Past Medical History:   Diagnosis Date    Allergy     Anemia     Arthritis     Atrial fibrillation     OAC    BMI 32.0-32.9,adult 02/22/2023    Chronic respiratory failure with hypoxia, on home oxygen therapy     2L with activity, off at rest.  Per Pulm  no overt evidence of ILD or COPD on PFTs and CT to explain O2 needs.    CKD (chronic kidney disease), stage IV 05/08/2018    Congestive heart failure     s/p AICD placement,    Deep vein thrombosis     Depression     elevated bilirubin d/t Gilbert's syndrome     confirmed by [facility] genetic testing, evaluated by hepatology    Encounter for blood transfusion     GERD (gastroesophageal reflux disease)     Hypertension     Pheochromocytoma, malignant     Right kidney mass     Sleep apnea     Thalassemia trait, alpha     Thyroid disease     Type 2 diabetes mellitus with hyperglycemia, without long-term current use of insulin 08/13/2020       Past Surgical History:   Procedure Laterality Date    ANGIOGRAM, ABDOMINAL AORTA Right 04/15/2024    APPENDECTOMY      BONE MARROW BIOPSY      CARDIAC DEFIBRILLATOR PLACEMENT Left 12/2016    CARDIAC ELECTROPHYSIOLOGY MAPPING AND ABLATION      CARDIAC ELECTROPHYSIOLOGY MAPPING AND ABLATION      COLONOSCOPY N/A 05/06/2022    Procedure: COLONOSCOPY;  Surgeon: Arely Betancourt MD;  Location: [facility] ([address] [place] [address]);  Service: Endoscopy;  Laterality: N/A;  heart transplant candidate/ EF 25% - 2nd floor/ defib - Biotronik - ERW  Eliquis - per Dr. Cortez with [facility], Pt ok to hold Eliquis x 2 days prior-see media tab-outside correspondence dated 12/30/21  - [facility]  verbal instructions/portal instructions/email instructions - s    EYE SURGERY      due to running tears    FRACTURE SURGERY Left     hand 5th digit    HYSTERECTOMY      KNEE SURGERY Left 2016    hematoma    LIVER BIOPSY  10/24/2018    Minimal steatosis, predominantly macrovesicular, 1%, Minimal nonspecific portal inflammation, no fibrosis. No findings on biopsy to explain elevated  "bilirubin levels. Could be d/t Gilbert's =?- hemolysis    RIGHT HEART CATHETERIZATION Right 12/07/2021    Procedure: INSERTION, CATHETER, RIGHT HEART;  Surgeon: Irving Cardenas MD;  Location: [facility] CATH LAB;  Service: Cardiology;  Laterality: Right;    RIGHT HEART CATHETERIZATION Right 12/19/2022    Procedure: INSERTION, CATHETER, RIGHT HEART;  Surgeon: Burke Camilo MD;  Location: [facility] CATH LAB;  Service: Cardiology;  Laterality: Right;    RIGHT HEART CATHETERIZATION Right 03/29/2023    Procedure: INSERTION, CATHETER, RIGHT HEART;  Surgeon: Katie Liriano DO;  Location: [facility] CATH LAB;  Service: Cardiology;  Laterality: Right;    TRANSJUGULAR BIOPSY OF LIVER N/A 10/24/2018    Procedure: BIOPSY, LIVER, TRANSJUGULAR APPROACH;  Surgeon: Carmen Diagnostic Provider;  Location: [facility] OR [address] [place] [address];  Service: Radiology;  Laterality: N/A;     Family History       Problem Relation (Age of Onset)    Cancer Mother    Cirrhosis Brother    Diabetes Brother    Heart attack Father    Heart disease Father, Sister, Brother, Sister, Brother    Hypertension Father, Brother          Tobacco Use    Smoking status: Never    Smokeless tobacco: Never   Substance and Sexual Activity    Alcohol use: Not Currently     Comment: up to 1 yr ago drank 2-3 drinks on occasion but sporadic    Drug use: No    Sexual activity: Yes     Partners: Male       Review of patient's allergies indicates:   Allergen Reactions    Penicillins Hives and Other (See Comments)    Iodinated contrast media Nausea And Vomiting    Oxycodone-acetaminophen Other (See Comments)     Nausea, Dizziness, Anxiety.  "I don't like how it makes me feel."   Given Hydromorphone 0.5mg IVP  Without problems.  Other reaction(s): Other (See Comments)    Clonazepam Other (See Comments)    Diovan hct [valsartan-hydrochlorothiazide] Other (See Comments)     Causes coughing    Iodine Other (See Comments)    Irinotecan      Pt has homozygosity for the TA7 promoter variant that places " this individual at significantly increased risk for   severe neutropenia (grade 4) when treated with the standard dose of irinotecan (risk approximately 50%).   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 Metabolism of other drugs not listed here may also be impacted by UGT1A1 enzyme activity.       Tramadol Nausea And Vomiting and Other (See Comments)     Other reaction(s): Other (See Comments)    Valsartan Other (See Comments)       Medications:  Continuous Infusions:   DOBUTamine  5 mcg/kg/min Intravenous Continuous 6.3 mL/hr at 11/23/24 0443 5.1345 mcg/kg/min at 11/23/24 0443    furosemide (Lasix) 500 mg in 50 mL infusion (conc: 10 mg/mL)  30 mg/hr Intravenous Continuous 3 mL/hr at 11/23/24 0834 30 mg/hr at 11/23/24 0834     Scheduled Meds:   amiodarone  200 mg Oral Daily    apixaban  5 mg Oral BID    aspirin  81 mg Oral Daily    atorvastatin  40 mg Oral QHS    cetirizine  10 mg Oral Daily    empagliflozin  10 mg Oral Daily    ferrous sulfate  1 tablet Oral Daily    fluticasone propionate  2 spray Each Nostril Daily    guaiFENesin  600 mg Oral BID    isosorbide mononitrate  30 mg Oral Daily    mupirocin   Nasal BID    sevelamer carbonate  1,600 mg Oral TID WM    tiotropium bromide  2 puff Inhalation Daily     PRN Meds:  Current Facility-Administered Medications:     acetaminophen, 650 mg, Oral, Q4H PRN    albuterol-ipratropium, 3 mL, Nebulization, Q6H PRN    ALPRAZolam, 0.5 mg, Oral, Daily PRN    aluminum-magnesium hydroxide-simethicone, 30 mL, Oral, QID PRN    benzonatate, 100 mg, Oral, TID PRN    calamine-zinc oxide 8-8%, , Topical (Top), QID PRN    glucagon (human recombinant), 1 mg, Intramuscular, PRN    glucose, 16 g, Oral, PRN    glucose, 24 g, Oral, PRN    guaiFENesin 100 mg/5 ml, 200 mg, Oral, Q4H PRN    HYDROcodone-acetaminophen, 1 tablet, Oral, Q6H PRN    melatonin, 6 mg, Oral,  Nightly PRN    naloxone, 0.02 mg, Intravenous, PRN    ondansetron, 8 mg, Oral, Q8H PRN    polyethylene glycol, 17 g, Oral, Daily PRN    pregabalin, 50 mg, Oral, Nightly PRN    simethicone, 1 tablet, Oral, QID PRN    sodium chloride 0.9%, 10 mL, Intravenous, PRN    Review of Systems   Constitutional:  Negative for fever and chills.   Skin:  Positive for itching and rash.     Objective:     Vital Signs (Most Recent):  Temp: 98 °F (36.7 °C) (11/23/24 1142)  Pulse: 85 (11/23/24 1200)  Resp: 19 (11/23/24 1142)  BP: 122/82 (11/23/24 1142)  SpO2: (!) 92 % (11/23/24 1142) Vital Signs (24h Range):  Temp:  [97.4 °F (36.3 °C)-98.9 °F (37.2 °C)] 98 °F (36.7 °C)  Pulse:  [] 85  Resp:  [17-19] 19  SpO2:  [91 %-100 %] 92 %  BP: (108-127)/(62-82) 122/82     Weight: 81 kg (178 lb 9.2 oz)  Body mass index is 28.82 kg/m².     Physical Exam   Constitutional: She appears well-developed and well-nourished. No distress.   Neurological: She is alert and oriented to person, place, and time. She is not disoriented.   Psychiatric: She has a normal mood and affect.   Skin:   Areas Examined (abnormalities noted in diagram):   Head / Face Inspection Performed  Neck Inspection Performed  Chest / Axilla Inspection Performed  Abdomen Inspection Performed  Back Inspection Performed  RUE Inspected  LUE Inspection Performed  RLE Inspected  LLE Inspection Performed  Nails and Digits Inspection Performed                       Significant Labs: All pertinent labs within the past 24 hours have been reviewed.    Significant Imaging: None

## 2024-11-23 NOTE — ASSESSMENT & PLAN NOTE
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 May also consider a perforating dermatosis in setting of patient's diabetes mellitus and CKD. Asymptomatic, can determine any further work-up or treatment in the outpatient setting.

## 2024-11-23 NOTE — PROGRESS NOTES
[facility] - Cardiology [facility] Medicine  Progress Note    Patient Name: Hafsa Hawley  MRN: [id number]  Patient Class: IP- Inpatient   Admission Date: 11/18/2024  Length of Stay: 5 days  Attending Physician: Peri Pastor MD  Primary Care Provider: Cristobal Ann MD      Subjective:     Principal Problem: Acute on chronic combined systolic and diastolic heart failure    HPI:  Hafsa Hawley is a 59 y.o. female with severe combined CHF (EF 10-15%) with ICD in place, AFib and VTach, CKD 4, T2DM not on insulin, restrictive lung disease, MELISSA, anemia, chronic pain on opioids, chronic prescription benzodiazepine use who presents from heart failure clinic for CHF exacerbation.     She reports that over the past few days, she has had an increase in her chronic shortness of breath particularly with exertion. She also notes lower extremity edema, which she usually doesn't get unless she is in severe CHF exacerbation. Has associated cough with chest pressure, which increases with cough.     She ran out of her Bumex due to the pharmacy not having it approximately one week ago. She has tried taking her Metolazone instead, which she usually takes 2-3 times per week PRN, but has been taking daily but has had persistent dyspnea and swelling despite this.     She presented to Cardiology clinic today, who recommended she be admitted for diuresis.     Overview/Hospital Course:  Referred from [facility] clinic due to concern for decompensated CHF. Not taking bumex x10 days. Cardiology consulted on arrival.  Started on IVP diuresis with minimal UOP. S/p lasix 80mg IVP x2, ultimately placed on lasix ggt. Given lack of candidacy for transplant, pt reportedly went to [facility] for second opinion and was again denied for advanced options. Palliative care team following to discuss goals of care given poor prognosis and lack of options.  Additionally consulted Psychiatry given depression like symptoms while pt navigates through  GOC conversations. Lasix ggt increased to 30cc/hr with continuation of dobutamine ggt w/ much improved UOP.     Interval History: NAEON. Continues to diurese well w/ current lasix & dobutamine regimen: 2.4L UOP, net -0.5L, and down 3kg in last 24 hours (down 6.2 kg & -5L since admission). Pt denies SOB or chest pain. Otherwise feeling alright. Derm consulted for painful b/l ankle rash. 6 beats of VT this AM- pt asymptomatic. Continued titration of ggts & cardiac package per Cardiology who is following. Palliative following; pt declines hospice at this time. Pt reports that her bags of dobutamine had been delivered to her room rather than her home; will update pharmacy when pt ready for discharge.     Review of Systems    Objective:     Vital Signs (Most Recent):  Temp: 97.5 °F (36.4 °C) (11/23/24 0754)  Pulse: 98 (11/23/24 1053)  Resp: 18 (11/23/24 0819)  BP: 114/79 (11/23/24 0754)  SpO2: 96 % (11/23/24 0819) Vital Signs (24h Range):  Temp:  [97.4 °F (36.3 °C)-98.9 °F (37.2 °C)] 97.5 °F (36.4 °C)  Pulse:  [] 98  Resp:  [17-18] 18  SpO2:  [91 %-100 %] 96 %  BP: (108-127)/(62-81) 114/79     Weight: 81 kg (178 lb 9.2 oz)  Body mass index is 28.82 kg/m².    Intake/Output Summary (Last 24 hours) at 11/23/2024 1130  Last data filed at 11/23/2024 0851  Gross per 24 hour   Intake 1792.01 ml   Output 2150 ml   Net -357.99 ml         Physical Exam  Constitutional:       General: She is not in acute distress.     Appearance: She is well-developed. She is ill-appearing (chronically). She is not toxic-appearing or diaphoretic.   HENT:      Head: Normocephalic and atraumatic.      Mouth/Throat:      Mouth: Mucous membranes are moist.   Eyes:      Conjunctiva/sclera: Conjunctivae normal.   Cardiovascular:      Rate and Rhythm: Normal rate. Rhythm irregular.      Heart sounds: Murmur heard.   Pulmonary:      Effort: Pulmonary effort is normal. No respiratory distress.      Breath sounds: Rales (rare bibasilar crackles)  present.   Abdominal:      General: Bowel sounds are normal.      Palpations: Abdomen is soft.      Tenderness: There is no abdominal tenderness. There is no guarding.   Musculoskeletal:      Right lower le+ Pitting Edema present.      Left lower le+ Pitting Edema present.   Skin:     Coloration: Skin is not jaundiced or pale.      Findings: Rash present.   Neurological:      General: No focal deficit present.      Mental Status: She is alert and oriented to person, place, and time.   Psychiatric:         Mood and Affect: Mood normal.         Behavior: Behavior normal.                    Significant Labs: All pertinent labs within the past 24 hours have been reviewed.    Significant Imaging: I have reviewed all pertinent imaging results/findings within the past 24 hours.    Assessment/Plan:      * Acute on chronic combined systolic and diastolic heart failure  -Patient with known severe CHF with last EF 10-15% and Grade 2 DD on TTE 2024, on chronic dobutamine gtt. Followed by advanced HF clinic, and has been considered for transplant however declined by 2 facilities. Presents with signs of volume overload on exam and worsening dyspnes, consistent with CHF exacerbation.   -TTE redemonstrating reduced EF although slighlty higher 15-20%. CVP 15mmHg  Plan:  -cardiology on board, appreciate assistance  -continue Lasix ggt, titrate as indicated  -Monitor UOP closely  - Will continue home dobutamine as well. Continue GDMT as tolerated, although limited chronically due to advanced CKD and softer BPs.  - Unable to add BB 2/2 dobutamine.   - Jardiance added   -Will obtain daily weights and monitor renal function and electrolytes on serial labs. Strict intake and output along with fluid restricted diet. Monitor on telemetry.     Goals of care, counseling/discussion  Advance Care Planning    [facility]  I engaged the patient in a voluntary conversation about advance care planning and we specifically addressed what the goals  of care would be moving forward, in light of the patient's change in clinical status, specifically advanced CHF.  We did specifically address the patient's likely prognosis, which is poor.  We explored the patient's values and preferences for future care.  The patient endorses that what is most important right now is to focus on  TBD, pending further discussions with palliative care team    Accordingly, we have decided that the best plan to meet the patient's goals includes  TBD    A total of 10 min was spent on advance care planning, goals of care discussion, emotional support, formulating and communicating prognosis and exploring burden/benefit of various approaches of treatment. This discussion occurred on a fully voluntary basis with the verbal consent of the patient and/or family.          Chronic pain with opioid use  Continue home regimen as verified by PDMP.    Type 2 diabetes mellitus without complication, without long-term current use of insulin  Recent A1c 5.7, well-controlled likely due to advancing CKD. Continue home Jardiance for CHF. No indication for insulin at this time, as to avoid hypoglycemia in advanced CKD.    Hyperlipidemia associated with type 2 diabetes mellitus  Continue hospital formulary of home statin.     Anemia of chronic disease  Stable, and around baseline without indication for transfusion. Monitor.     Paroxysmal A-fib  Patient has persistent (7 days or more) atrial fibrillation. [id number] Score: 3. The patients heart rate in the last 24 hours is as follows:  Pulse  Min: 82  Max: 93     Antiarrhythmics  amiodarone tablet 200 mg, Daily, Oral    Anticoagulants  apixaban tablet 5 mg, 2 times daily, Oral    Plan  - Replete lytes with a goal of K>4, Mg >2  - Patient is anticoagulated, see medications listed above.  - Patient's afib is currently controlled  - Holding home BB given dobutamine ggt  - Continue home amiodarone & apixaban     Pulmonary HTN  Continue diuresis with goal of  euvolemia.    CKD (chronic kidney disease), stage IV  Renal function remains around baseline; Estimated Creatinine Clearance: 22.1 mL/min (A) (based on SCr of 3 mg/dL (H)). according to latest data. Based on current GFR, CKD stage is stage 4 - GFR 15-29. Will avoid nephrotoxic agents as able, and renally dose all meds as applicable.    -Cr high but still below baseline  -Monitor closely during diuresis    Chronic prescription benzodiazepine use  Verified by PDMP. Continue reduced dose of home regimen as to not precipitate withdrawal, however cautiously as this is likely not an optimal medication for this patient with multiple advanced comorbidities and chronic respiratory failure.    Chronic respiratory failure with hypoxia  Reports using home oxygen, likely with chronic intermittent respiratory failure due to restrictive lung disease on PFTs and advanced CHF. Wean oxygen to maintain sats 90% or greater.     Paroxysmal supraventricular tachycardia  Continue Amiodarone (started during recent hospitalization in [place]). Monitor on telemetry.       Depression due to physical illness  -Previously on zoloft, pt apparently self-discontinued  -Will involve psychiatry team at this time        MELISSA (obstructive sleep apnea)  Non-compliant with CPAP. Monitor.     Essential hypertension  Chronic, and currently controlled. Latest blood pressure and vitals reviewed-   While in the hospital, will manage blood pressure as follows  - Imdur 30mg daily  - Diuresis as above    Will utilize p.r.n. blood pressure medication only if patient's blood pressure greater than 180/110 and she develops symptoms such as worsening chest pain or shortness of breath.      VTE Risk Mitigation (From admission, onward)           Ordered     apixaban tablet 5 mg  2 times daily         11/19/24 1214     Reason for No Pharmacological VTE Prophylaxis  Once        Question:  Reasons:  Answer:  Already adequately anticoagulated on oral Anticoagulants     11/18/24 1904     IP VTE HIGH RISK PATIENT  Once         11/18/24 1904     Place sequential compression device  Until discontinued         11/18/24 1904                    Discharge Planning   CHIQUI: 11/25/2024     Code Status: Full Code   Is the patient medically ready for discharge?:     Reason for patient still in hospital (select all that apply): Patient trending condition, Treatment, and Consult recommendations  Discharge Plan A: Home with family, Other (home infusion)          Peri Pastor MD  Department of Hospital Medicine   [facility] - Cardiology Stepdown

## 2024-11-24 LAB
ALBUMIN SERPL BCP-MCNC: 3.4 G/DL (ref 3.5–5.2)
ANION GAP SERPL CALC-SCNC: 11 MMOL/L (ref 8–16)
BACTERIA BLD CULT: NORMAL
BACTERIA BLD CULT: NORMAL
BUN SERPL-MCNC: 96 MG/DL (ref 6–20)
CALCIUM SERPL-MCNC: 9.1 MG/DL (ref 8.7–10.5)
CHLORIDE SERPL-SCNC: 95 MMOL/L (ref 95–110)
CO2 SERPL-SCNC: 36 MMOL/L (ref 23–29)
CREAT SERPL-MCNC: 2.7 MG/DL (ref 0.5–1.4)
ERYTHROCYTE [DISTWIDTH] IN BLOOD BY AUTOMATED COUNT: 34.1 % (ref 11.5–14.5)
EST. GFR  (NO RACE VARIABLE): 19.7 ML/MIN/1.73 M^2
GLUCOSE SERPL-MCNC: 95 MG/DL (ref 70–110)
HCT VFR BLD AUTO: 33.2 % (ref 37–48.5)
HGB BLD-MCNC: 9.2 G/DL (ref 12–16)
MAGNESIUM SERPL-MCNC: 2.4 MG/DL (ref 1.6–2.6)
MCH RBC QN AUTO: 18.6 PG (ref 27–31)
MCHC RBC AUTO-ENTMCNC: 27.7 G/DL (ref 32–36)
MCV RBC AUTO: 67 FL (ref 82–98)
PHOSPHATE SERPL-MCNC: 4.1 MG/DL (ref 2.7–4.5)
PLATELET # BLD AUTO: 113 K/UL (ref 150–450)
PMV BLD AUTO: ABNORMAL FL (ref 9.2–12.9)
POTASSIUM SERPL-SCNC: 4.1 MMOL/L (ref 3.5–5.1)
RBC # BLD AUTO: 4.95 M/UL (ref 4–5.4)
SODIUM SERPL-SCNC: 142 MMOL/L (ref 136–145)
WBC # BLD AUTO: 4.53 K/UL (ref 3.9–12.7)

## 2024-11-24 PROCEDURE — 20600001 HC STEP DOWN PRIVATE ROOM

## 2024-11-24 PROCEDURE — 85027 COMPLETE CBC AUTOMATED: CPT | Performed by: STUDENT IN AN ORGANIZED HEALTH CARE EDUCATION/TRAINING PROGRAM

## 2024-11-24 PROCEDURE — 83735 ASSAY OF MAGNESIUM: CPT | Performed by: STUDENT IN AN ORGANIZED HEALTH CARE EDUCATION/TRAINING PROGRAM

## 2024-11-24 PROCEDURE — 25000003 PHARM REV CODE 250: Performed by: STUDENT IN AN ORGANIZED HEALTH CARE EDUCATION/TRAINING PROGRAM

## 2024-11-24 PROCEDURE — 63600175 PHARM REV CODE 636 W HCPCS: Performed by: STUDENT IN AN ORGANIZED HEALTH CARE EDUCATION/TRAINING PROGRAM

## 2024-11-24 PROCEDURE — 80069 RENAL FUNCTION PANEL: CPT | Performed by: STUDENT IN AN ORGANIZED HEALTH CARE EDUCATION/TRAINING PROGRAM

## 2024-11-24 RX ORDER — BUMETANIDE 1 MG/1
2 TABLET ORAL 2 TIMES DAILY
Status: DISCONTINUED | OUTPATIENT
Start: 2024-11-24 | End: 2024-11-25 | Stop reason: HOSPADM

## 2024-11-24 RX ORDER — PRENATAL VIT 91/IRON/FOLIC/DHA 28-975-200
COMBINATION PACKAGE (EA) ORAL 2 TIMES DAILY
Status: DISCONTINUED | OUTPATIENT
Start: 2024-11-24 | End: 2024-11-25 | Stop reason: HOSPADM

## 2024-11-24 RX ADMIN — ISOSORBIDE MONONITRATE 30 MG: 30 TABLET, EXTENDED RELEASE ORAL at 08:11

## 2024-11-24 RX ADMIN — APIXABAN 5 MG: 5 TABLET, FILM COATED ORAL at 08:11

## 2024-11-24 RX ADMIN — AMIODARONE HYDROCHLORIDE 200 MG: 200 TABLET ORAL at 08:11

## 2024-11-24 RX ADMIN — DOBUTAMINE HYDROCHLORIDE 5 MCG/KG/MIN: 400 INJECTION INTRAVENOUS at 06:11

## 2024-11-24 RX ADMIN — FERROUS SULFATE TAB EC 325 MG (65 MG FE EQUIVALENT) 1 EACH: 325 (65 FE) TABLET DELAYED RESPONSE at 08:11

## 2024-11-24 RX ADMIN — APIXABAN 5 MG: 5 TABLET, FILM COATED ORAL at 09:11

## 2024-11-24 RX ADMIN — ACETAMINOPHEN 650 MG: 325 TABLET ORAL at 09:11

## 2024-11-24 RX ADMIN — ONDANSETRON 8 MG: 8 TABLET, ORALLY DISINTEGRATING ORAL at 08:11

## 2024-11-24 RX ADMIN — SEVELAMER CARBONATE 1600 MG: 800 TABLET, FILM COATED ORAL at 05:11

## 2024-11-24 RX ADMIN — MICONAZOLE NITRATE: 20 CREAM TOPICAL at 08:11

## 2024-11-24 RX ADMIN — GUAIFENESIN 600 MG: 600 TABLET, EXTENDED RELEASE ORAL at 09:11

## 2024-11-24 RX ADMIN — FUROSEMIDE 30 MG/HR: 10 INJECTION, SOLUTION INTRAMUSCULAR; INTRAVENOUS at 04:11

## 2024-11-24 RX ADMIN — SEVELAMER CARBONATE 1600 MG: 800 TABLET, FILM COATED ORAL at 08:11

## 2024-11-24 RX ADMIN — EMPAGLIFLOZIN 10 MG: 10 TABLET, FILM COATED ORAL at 08:11

## 2024-11-24 RX ADMIN — ATORVASTATIN CALCIUM 40 MG: 20 TABLET, FILM COATED ORAL at 09:11

## 2024-11-24 RX ADMIN — ASPIRIN 81 MG: 81 TABLET, COATED ORAL at 08:11

## 2024-11-24 RX ADMIN — CETIRIZINE HYDROCHLORIDE 10 MG: 10 TABLET, FILM COATED ORAL at 08:11

## 2024-11-24 RX ADMIN — BUMETANIDE 2 MG: 1 TABLET ORAL at 05:11

## 2024-11-24 RX ADMIN — SEVELAMER CARBONATE 1600 MG: 800 TABLET, FILM COATED ORAL at 12:11

## 2024-11-24 RX ADMIN — GUAIFENESIN 600 MG: 600 TABLET, EXTENDED RELEASE ORAL at 08:11

## 2024-11-24 RX ADMIN — FLUTICASONE PROPIONATE 100 MCG: 50 SPRAY, METERED NASAL at 08:11

## 2024-11-24 RX ADMIN — MICONAZOLE NITRATE: 20 CREAM TOPICAL at 09:11

## 2024-11-24 NOTE — SUBJECTIVE & OBJECTIVE
Interval History: Seen this AM at bedside.  No acute events reported overnight.  Breathing significantly improved from before.  Still feel as anxious.  Denies chest pain    Review of Systems  Objective:     Vital Signs (Most Recent):  Temp: 98.1 °F (36.7 °C) (11/24/24 1226)  Pulse: 88 (11/24/24 1226)  Resp: 18 (11/24/24 1226)  BP: 117/77 (11/24/24 1226)  SpO2: 97 % (11/24/24 1226) Vital Signs (24h Range):  Temp:  [97.6 °F (36.4 °C)-99.1 °F (37.3 °C)] 98.1 °F (36.7 °C)  Pulse:  [78-94] 88  Resp:  [16-19] 18  SpO2:  [86 %-99 %] 97 %  BP: (103-135)/(55-82) 117/77     Weight: 80.1 kg (176 lb 9.4 oz)  Body mass index is 28.5 kg/m².    Intake/Output Summary (Last 24 hours) at 11/24/2024 1317  Last data filed at 11/24/2024 0925  Gross per 24 hour   Intake 720 ml   Output 2400 ml   Net -1680 ml         Physical Exam  Vitals and nursing note reviewed.   Constitutional:       General: She is not in acute distress.     Appearance: She is well-developed. She is ill-appearing (chronically). She is not diaphoretic.   HENT:      Head: Normocephalic and atraumatic.      Mouth/Throat:      Mouth: Mucous membranes are moist.   Eyes:      Pupils: Pupils are equal, round, and reactive to light.   Neck:      Vascular: No JVD.   Cardiovascular:      Rate and Rhythm: Normal rate. Rhythm irregular.      Heart sounds: Murmur heard.   Pulmonary:      Effort: Pulmonary effort is normal. No respiratory distress.   Abdominal:      General: There is distension.      Tenderness: There is no abdominal tenderness.   Musculoskeletal:      Right lower leg: Edema present.      Left lower leg: Edema present.      Comments: Pitting edema to BL LEs   Skin:     Coloration: Skin is not jaundiced or pale.   Neurological:      General: No focal deficit present.      Mental Status: She is alert and oriented to person, place, and time.      Motor: No abnormal muscle tone.   Psychiatric:         Mood and Affect: Mood normal.         Behavior: Behavior normal.          Thought Content: Thought content normal.             Significant Labs: All pertinent labs within the past 24 hours have been reviewed.    Significant Imaging: I have reviewed all pertinent imaging results/findings within the past 24 hours.

## 2024-11-24 NOTE — PLAN OF CARE
Switch patient back to home dose of bumex 2mg twice daily and PRN Metolazone. D/C lasix gtt. Monitor urine output and kidney function with BMP after switching to PO diuretics. Monitor inpatient on PO diuresis for at least 24 hrs. Consider home health on discharge.

## 2024-11-24 NOTE — ASSESSMENT & PLAN NOTE
-dermatology consulted, exam consistent with tinea pedis, confirmed on KOH at bedside  -terbinafine 1% cream BID to affected areas on bilateral feet

## 2024-11-24 NOTE — SUBJECTIVE & OBJECTIVE
Interval History: Seen this AM at bedside.  No acute events reported overnight.  Breathing significantly improved from before.  Still feel as anxious.  Denies chest pain    Review of Systems  Objective:     Vital Signs (Most Recent):  Temp: 99.1 °F (37.3 °C) (11/24/24 0833)  Pulse: 78 (11/24/24 0833)  Resp: 18 (11/24/24 0833)  BP: 130/81 (11/24/24 0833)  SpO2: 97 % (11/24/24 0833) Vital Signs (24h Range):  Temp:  [97.6 °F (36.4 °C)-99.1 °F (37.3 °C)] 99.1 °F (37.3 °C)  Pulse:  [78-98] 78  Resp:  [16-19] 18  SpO2:  [86 %-99 %] 97 %  BP: (103-135)/(55-82) 130/81     Weight: 80.1 kg (176 lb 9.4 oz)  Body mass index is 28.5 kg/m².    Intake/Output Summary (Last 24 hours) at 11/24/2024 1032  Last data filed at 11/24/2024 0925  Gross per 24 hour   Intake 720 ml   Output 2400 ml   Net -1680 ml         Physical Exam  Vitals and nursing note reviewed.   Constitutional:       General: She is not in acute distress.     Appearance: She is well-developed. She is ill-appearing (chronically). She is not diaphoretic.   HENT:      Head: Normocephalic and atraumatic.      Mouth/Throat:      Mouth: Mucous membranes are moist.   Eyes:      Pupils: Pupils are equal, round, and reactive to light.   Neck:      Vascular: No JVD.   Cardiovascular:      Rate and Rhythm: Normal rate. Rhythm irregular.      Heart sounds: Murmur heard.   Pulmonary:      Effort: Pulmonary effort is normal. No respiratory distress.   Abdominal:      General: There is distension.      Tenderness: There is no abdominal tenderness.   Musculoskeletal:      Right lower leg: Edema present.      Left lower leg: Edema present.      Comments: Pitting edema to BL LEs   Skin:     Coloration: Skin is not jaundiced or pale.   Neurological:      General: No focal deficit present.      Mental Status: She is alert and oriented to person, place, and time.      Motor: No abnormal muscle tone.   Psychiatric:         Mood and Affect: Mood normal.         Behavior: Behavior normal.          Thought Content: Thought content normal.             Significant Labs: All pertinent labs within the past 24 hours have been reviewed.    Significant Imaging: I have reviewed all pertinent imaging results/findings within the past 24 hours.

## 2024-11-24 NOTE — ASSESSMENT & PLAN NOTE
-Patient with known severe CHF with last EF 10-15% and Grade 2 DD on TTE 5/2024, on chronic dobutamine gtt. Followed by advanced HF clinic, and has been considered for transplant however declined by 2 facilities. Presents with signs of volume overload on exam and worsening dyspnes, consistent with CHF exacerbation.   -TTE redemonstrating reduced EF although slighlty higher 15-20%. CVP 15mmHg  Plan:  -cardiology on board, appreciate assistance  -Discontinue lasix ggt today and transition to home diuretic regimen  -Monitor UOP closely  - Will continue home dobutamine as well. Continue GDMT as tolerated, although limited chronically due to advanced CKD and softer BPs.  - Unable to add BB 2/2 dobutamine.   - Jardiance added   -Will obtain daily weights and monitor renal function and electrolytes on serial labs. Strict intake and output along with fluid restricted diet. Monitor on telemetry.

## 2024-11-24 NOTE — PROGRESS NOTES
[facility] - Cardiology [facility] Medicine  Progress Note    Patient Name: Hafsa Hawley  MRN: [id number]  Patient Class: IP- Inpatient   Admission Date: 11/18/2024  Length of Stay: 6 days  Attending Physician: Denny Elias DO  Primary Care Provider: Cristobal Ann MD        Subjective:     Principal Problem:Acute on chronic combined systolic and diastolic heart failure        HPI:  Hafsa Hawley is a 59 y.o. female with severe combined CHF (EF 10-15%) with ICD in place, AFib and VTach, CKD 4, T2DM not on insulin, restrictive lung disease, MELISSA, anemia, chronic pain on opioids, chronic prescription benzodiazepine use who presents from heart failure clinic for CHF exacerbation.     She reports that over the past few days, she has had an increase in her chronic shortness of breath particularly with exertion. She also notes lower extremity edema, which she usually doesn't get unless she is in severe CHF exacerbation. Has associated cough with chest pressure, which increases with cough.     She ran out of her Bumex due to the pharmacy not having it approximately one week ago. She has tried taking her Metolazone instead, which she usually takes 2-3 times per week PRN, but has been taking daily but has had persistent dyspnea and swelling despite this.     She presented to Cardiology clinic today, who recommended she be admitted for diuresis.     Overview/Hospital Course:  Referred from \[facility] [place] [facility]\"" clinic due to concern for decompensated CHF. Not taking bumex x10 days. Cardiology consulted on arrival.  Started on IVP diuresis with minimal UOP. S/p lasix 80mg IVP x2, ultimately placed on lasix ggt. Given lack of candidacy for transplant, pt reportedly went to [place] for second opinion and was again denied for advanced options. Palliative care team following to discuss goals of care given poor prognosis and lack of options.  Additionally consulted Psychiatry given depression like symptoms while pt navigates  through GOC conversations. Lasix ggt increased to 30cc/hr with continuation of dobutamine ggt w/ much improved UOP.     Interval History: Seen this AM at bedside.  No acute events reported overnight.  Breathing significantly improved from before.  Still feel as anxious.  Denies chest pain    Review of Systems  Objective:     Vital Signs (Most Recent):  Temp: 98.1 °F (36.7 °C) (11/24/24 1226)  Pulse: 88 (11/24/24 1226)  Resp: 18 (11/24/24 1226)  BP: 117/77 (11/24/24 1226)  SpO2: 97 % (11/24/24 1226) Vital Signs (24h Range):  Temp:  [97.6 °F (36.4 °C)-99.1 °F (37.3 °C)] 98.1 °F (36.7 °C)  Pulse:  [78-94] 88  Resp:  [16-19] 18  SpO2:  [86 %-99 %] 97 %  BP: (103-135)/(55-82) 117/77     Weight: 80.1 kg (176 lb 9.4 oz)  Body mass index is 28.5 kg/m².    Intake/Output Summary (Last 24 hours) at 11/24/2024 1317  Last data filed at 11/24/2024 0925  Gross per 24 hour   Intake 720 ml   Output 2400 ml   Net -1680 ml         Physical Exam  Vitals and nursing note reviewed.   Constitutional:       General: She is not in acute distress.     Appearance: She is well-developed. She is ill-appearing (chronically). She is not diaphoretic.   HENT:      Head: Normocephalic and atraumatic.      Mouth/Throat:      Mouth: Mucous membranes are moist.   Eyes:      Pupils: Pupils are equal, round, and reactive to light.   Neck:      Vascular: No JVD.   Cardiovascular:      Rate and Rhythm: Normal rate. Rhythm irregular.      Heart sounds: Murmur heard.   Pulmonary:      Effort: Pulmonary effort is normal. No respiratory distress.   Abdominal:      General: There is distension.      Tenderness: There is no abdominal tenderness.   Musculoskeletal:      Right lower leg: Edema present.      Left lower leg: Edema present.      Comments: Pitting edema to BL LEs   Skin:     Coloration: Skin is not jaundiced or pale.   Neurological:      General: No focal deficit present.      Mental Status: She is alert and oriented to person, place, and time.       Motor: No abnormal muscle tone.   Psychiatric:         Mood and Affect: Mood normal.         Behavior: Behavior normal.         Thought Content: Thought content normal.             Significant Labs: All pertinent labs within the past 24 hours have been reviewed.    Significant Imaging: I have reviewed all pertinent imaging results/findings within the past 24 hours.    Assessment/Plan:      * Acute on chronic combined systolic and diastolic heart failure  -Patient with known severe CHF with last EF 10-15% and Grade 2 DD on TTE 5/2024, on chronic dobutamine gtt. Followed by advanced HF clinic, and has been considered for transplant however declined by 2 facilities. Presents with signs of volume overload on exam and worsening dyspnes, consistent with CHF exacerbation.   -TTE redemonstrating reduced EF although slighlty higher 15-20%. CVP 15mmHg  Plan:  -cardiology on board, appreciate assistance  -Discontinue lasix ggt today and transition to home diuretic regimen  -Monitor UOP closely  - Will continue home dobutamine as well. Continue GDMT as tolerated, although limited chronically due to advanced CKD and softer BPs.  - Unable to add BB 2/2 dobutamine.   - Jardiance added   -Will obtain daily weights and monitor renal function and electrolytes on serial labs. Strict intake and output along with fluid restricted diet. Monitor on telemetry.     Tinea pedis of both feet  -dermatology consulted, exam consistent with tinea pedis, confirmed on KOH at bedside  -terbinafine 1% cream BID to affected areas on bilateral feet       Goals of care, counseling/discussion  Advance Care Planning    [facility]  I engaged the patient in a voluntary conversation about advance care planning and we specifically addressed what the goals of care would be moving forward, in light of the patient's change in clinical status, specifically advanced CHF.  We did specifically address the patient's likely prognosis, which is poor.  We explored the  patient's values and preferences for future care.  The patient endorses that what is most important right now is to focus on  TBD, pending further discussions with palliative care team    Accordingly, we have decided that the best plan to meet the patient's goals includes  continue treatment    A total of 10 min was spent on advance care planning, goals of care discussion, emotional support, formulating and communicating prognosis and exploring burden/benefit of various approaches of treatment. This discussion occurred on a fully voluntary basis with the verbal consent of the patient and/or family.          Chronic pain with opioid use  Continue home regimen as verified by PDMP.    Type 2 diabetes mellitus without complication, without long-term current use of insulin  Recent A1c 5.7, well-controlled likely due to advancing CKD. Continue home Jardiance for CHF. No indication for insulin at this time, as to avoid hypoglycemia in advanced CKD.    Hyperlipidemia associated with type 2 diabetes mellitus  Continue hospital formulary of home statin.     Anemia of chronic disease  Stable, and around baseline without indication for transfusion. Monitor.     Paroxysmal A-fib  Patient has persistent (7 days or more) atrial fibrillation. [id number] Score: 3. The patients heart rate in the last 24 hours is as follows:  Pulse  Min: 82  Max: 93     Antiarrhythmics  amiodarone tablet 200 mg, Daily, Oral    Anticoagulants  apixaban tablet 5 mg, 2 times daily, Oral    Plan  - Replete lytes with a goal of K>4, Mg >2  - Patient is anticoagulated, see medications listed above.  - Patient's afib is currently controlled  - Holding home BB given dobutamine ggt  - Continue home amiodarone & apixaban     Pulmonary HTN  Continue diuresis with goal of euvolemia.    CKD (chronic kidney disease), stage IV  Renal function remains around baseline; Estimated Creatinine Clearance: 22.1 mL/min (A) (based on SCr of 3 mg/dL (H)). according to latest  data. Based on current GFR, CKD stage is stage 4 - GFR 15-29. Will avoid nephrotoxic agents as able, and renally dose all meds as applicable.    -Cr high but still below baseline  -Monitor closely during diuresis    Chronic prescription benzodiazepine use  Verified by PDMP. Continue reduced dose of home regimen as to not precipitate withdrawal, however cautiously as this is likely not an optimal medication for this patient with multiple advanced comorbidities and chronic respiratory failure.    Chronic respiratory failure with hypoxia  Reports using home oxygen, likely with chronic intermittent respiratory failure due to restrictive lung disease on PFTs and advanced CHF. Wean oxygen to maintain sats 90% or greater.     Paroxysmal supraventricular tachycardia  Continue Amiodarone (started during recent hospitalization in [place]). Monitor on telemetry.       Depression due to physical illness  -Previously on zoloft, pt apparently self-discontinued  -psychiatry previously involved, now signed off        MELISSA (obstructive sleep apnea)  Non-compliant with CPAP. Monitor.     Essential hypertension  Chronic, and currently controlled. Latest blood pressure and vitals reviewed-   While in the hospital, will manage blood pressure as follows  - Imdur 30mg daily  - Diuresis as above    Will utilize p.r.n. blood pressure medication only if patient's blood pressure greater than 180/110 and she develops symptoms such as worsening chest pain or shortness of breath.      VTE Risk Mitigation (From admission, onward)           Ordered     apixaban tablet 5 mg  2 times daily         11/19/24 1214     Reason for No Pharmacological VTE Prophylaxis  Once        Question:  Reasons:  Answer:  Already adequately anticoagulated on oral Anticoagulants    11/18/24 1904     IP VTE HIGH RISK PATIENT  Once         11/18/24 1904     Place sequential compression device  Until discontinued         11/18/24 1904                    Discharge Planning    CHIQUI: 11/25/2024     Code Status: Full Code   Is the patient medically ready for discharge?:     Reason for patient still in hospital (select all that apply): Patient trending condition  Discharge Plan A: Home with family, Other (home infusion)                  Denny Elias DO  Department of Hospital Medicine   [facility] - Cardiology Stepdown

## 2024-11-24 NOTE — PLAN OF CARE
Problem: Adult Inpatient Plan of Care  Goal: Plan of Care Review  Outcome: Progressing  Goal: Patient-Specific Goal (Individualized)  Outcome: Progressing  Goal: Absence of Hospital-Acquired Illness or Injury  Outcome: Progressing  Goal: Optimal Comfort and Wellbeing  Outcome: Progressing  Goal: Readiness for Transition of Care  Outcome: Progressing     Problem: Diabetes Comorbidity  Goal: Blood Glucose Level Within Targeted Range  Outcome: Progressing     Problem: Coping Ineffective  Goal: Effective Coping  Outcome: Progressing     Problem: Infection  Goal: Absence of Infection Signs and Symptoms  Outcome: Progressing     Problem: Heart Failure  Goal: Optimal Coping  Outcome: Progressing  Goal: Optimal Cardiac Output  Outcome: Progressing  Goal: Stable Heart Rate and Rhythm  Outcome: Progressing  Goal: Optimal Functional Ability  Outcome: Progressing  Goal: Fluid and Electrolyte Balance  Outcome: Progressing  Goal: Improved Oral Intake  Outcome: Progressing  Goal: Effective Oxygenation and Ventilation  Outcome: Progressing  Goal: Effective Breathing Pattern During Sleep  Outcome: Progressing     Problem: Skin Injury Risk Increased  Goal: Skin Health and Integrity  Outcome: Progressing     Problem: Fall Injury Risk  Goal: Absence of Fall and Fall-Related Injury  Outcome: Progressing     Patient remained free of falls. A&O x4, plan of care reviewed with patient. Will continue to monitor.

## 2024-11-24 NOTE — ASSESSMENT & PLAN NOTE
-Previously on zoloft, pt apparently self-discontinued  -psychiatry previously involved, now signed off

## 2024-11-24 NOTE — ASSESSMENT & PLAN NOTE
Advance Care Planning     [facility]  I engaged the patient in a voluntary conversation about advance care planning and we specifically addressed what the goals of care would be moving forward, in light of the patient's change in clinical status, specifically advanced CHF.  We did specifically address the patient's likely prognosis, which is poor.  We explored the patient's values and preferences for future care.  The patient endorses that what is most important right now is to focus on  TBD, pending further discussions with palliative care team    Accordingly, we have decided that the best plan to meet the patient's goals includes  continue treatment    A total of 10 min was spent on advance care planning, goals of care discussion, emotional support, formulating and communicating prognosis and exploring burden/benefit of various approaches of treatment. This discussion occurred on a fully voluntary basis with the verbal consent of the patient and/or family.

## 2024-11-24 NOTE — PLAN OF CARE
AAOX4,VSS, Plan of care discussed with patient. Patient has no complaints of chest pain/SOB/palpitations. Pt ambulating  with standby assistance, fall precautions in place,no falls/injuries through the shift.Discussed medications and care.Patient has no questions at this time.Pt resting comfortably with no acute distress.Call light within reach,bed at lowest position.  gtt infusing per MAR. Lasix gtt d/c. PO Bumex initiated.     Problem: Adult Inpatient Plan of Care  Goal: Patient-Specific Goal (Individualized)  Outcome: Progressing  Flowsheets (Taken 11/24/2024 1642)  Individualized Care Needs:  gtt infusing per MAR  Anxieties, Fears or Concerns: none stated     Problem: Skin Injury Risk Increased  Goal: Skin Health and Integrity  Outcome: Progressing  Intervention: Optimize Skin Protection  Flowsheets (Taken 11/24/2024 1642)  Pressure Reduction Techniques: frequent weight shift encouraged  Pressure Reduction Devices: positioning supports utilized  Skin Protection: incontinence pads utilized  Activity Management: Ambulated -L4  Head of Bed (HOB) Positioning: HOB elevated     Problem: Fall Injury Risk  Goal: Absence of Fall and Fall-Related Injury  Outcome: Progressing  Intervention: Identify and Manage Contributors  Flowsheets (Taken 11/24/2024 1642)  Self-Care Promotion:   independence encouraged   BADL personal objects within reach   BADL personal routines maintained  Medication Review/Management: medications reviewed  Intervention: Promote Injury-Free Environment  Flowsheets (Taken 11/24/2024 1642)  Safety Promotion/Fall Prevention:   assistive device/personal item within reach   commode/urinal/bedpan at bedside   Fall Risk reviewed with patient/family   Fall Risk signage in place   medications reviewed   nonskid shoes/socks when out of bed   side rails raised x 2   room near unit station

## 2024-11-25 VITALS
DIASTOLIC BLOOD PRESSURE: 70 MMHG | TEMPERATURE: 99 F | HEART RATE: 92 BPM | RESPIRATION RATE: 18 BRPM | SYSTOLIC BLOOD PRESSURE: 139 MMHG | BODY MASS INDEX: 28.56 KG/M2 | WEIGHT: 177.69 LBS | HEIGHT: 66 IN | OXYGEN SATURATION: 98 %

## 2024-11-25 PROBLEM — I50.43 ACUTE ON CHRONIC COMBINED SYSTOLIC AND DIASTOLIC HEART FAILURE: Chronic | Status: RESOLVED | Noted: 2018-03-16 | Resolved: 2024-11-25

## 2024-11-25 PROBLEM — I50.43 ACUTE ON CHRONIC COMBINED SYSTOLIC (CONGESTIVE) AND DIASTOLIC (CONGESTIVE) HEART FAILURE: Status: RESOLVED | Noted: 2024-11-19 | Resolved: 2024-11-25

## 2024-11-25 LAB
ALBUMIN SERPL BCP-MCNC: 3.5 G/DL (ref 3.5–5.2)
ANION GAP SERPL CALC-SCNC: 15 MMOL/L (ref 8–16)
BUN SERPL-MCNC: 102 MG/DL (ref 6–20)
CALCIUM SERPL-MCNC: 9.3 MG/DL (ref 8.7–10.5)
CHLORIDE SERPL-SCNC: 93 MMOL/L (ref 95–110)
CO2 SERPL-SCNC: 33 MMOL/L (ref 23–29)
CREAT SERPL-MCNC: 2.9 MG/DL (ref 0.5–1.4)
ERYTHROCYTE [DISTWIDTH] IN BLOOD BY AUTOMATED COUNT: 34.1 % (ref 11.5–14.5)
EST. GFR  (NO RACE VARIABLE): 18.1 ML/MIN/1.73 M^2
GLUCOSE SERPL-MCNC: 72 MG/DL (ref 70–110)
HCT VFR BLD AUTO: 33.2 % (ref 37–48.5)
HGB BLD-MCNC: 9.4 G/DL (ref 12–16)
MAGNESIUM SERPL-MCNC: 2.4 MG/DL (ref 1.6–2.6)
MCH RBC QN AUTO: 18.6 PG (ref 27–31)
MCHC RBC AUTO-ENTMCNC: 28.3 G/DL (ref 32–36)
MCV RBC AUTO: 66 FL (ref 82–98)
PHOSPHATE SERPL-MCNC: 4.3 MG/DL (ref 2.7–4.5)
PLATELET # BLD AUTO: 120 K/UL (ref 150–450)
PMV BLD AUTO: ABNORMAL FL (ref 9.2–12.9)
POTASSIUM SERPL-SCNC: 4.2 MMOL/L (ref 3.5–5.1)
RBC # BLD AUTO: 5.05 M/UL (ref 4–5.4)
SODIUM SERPL-SCNC: 141 MMOL/L (ref 136–145)
WBC # BLD AUTO: 4.29 K/UL (ref 3.9–12.7)

## 2024-11-25 PROCEDURE — 25000003 PHARM REV CODE 250: Performed by: STUDENT IN AN ORGANIZED HEALTH CARE EDUCATION/TRAINING PROGRAM

## 2024-11-25 PROCEDURE — 99232 SBSQ HOSP IP/OBS MODERATE 35: CPT | Mod: ,,, | Performed by: INTERNAL MEDICINE

## 2024-11-25 PROCEDURE — 83735 ASSAY OF MAGNESIUM: CPT | Performed by: STUDENT IN AN ORGANIZED HEALTH CARE EDUCATION/TRAINING PROGRAM

## 2024-11-25 PROCEDURE — 97116 GAIT TRAINING THERAPY: CPT

## 2024-11-25 PROCEDURE — 94761 N-INVAS EAR/PLS OXIMETRY MLT: CPT

## 2024-11-25 PROCEDURE — 97535 SELF CARE MNGMENT TRAINING: CPT

## 2024-11-25 PROCEDURE — 80069 RENAL FUNCTION PANEL: CPT | Performed by: STUDENT IN AN ORGANIZED HEALTH CARE EDUCATION/TRAINING PROGRAM

## 2024-11-25 PROCEDURE — 85027 COMPLETE CBC AUTOMATED: CPT | Performed by: STUDENT IN AN ORGANIZED HEALTH CARE EDUCATION/TRAINING PROGRAM

## 2024-11-25 PROCEDURE — 99900035 HC TECH TIME PER 15 MIN (STAT)

## 2024-11-25 PROCEDURE — 94640 AIRWAY INHALATION TREATMENT: CPT

## 2024-11-25 PROCEDURE — 97165 OT EVAL LOW COMPLEX 30 MIN: CPT

## 2024-11-25 PROCEDURE — 97161 PT EVAL LOW COMPLEX 20 MIN: CPT

## 2024-11-25 RX ORDER — CAMPHOR
SPIRIT TOPICAL 4 TIMES DAILY PRN
Qty: 177 ML | Refills: 0 | Status: SHIPPED | OUTPATIENT
Start: 2024-11-25

## 2024-11-25 RX ORDER — METOLAZONE 5 MG/1
5 TABLET ORAL DAILY PRN
Qty: 30 TABLET | Refills: 1
Start: 2024-11-25

## 2024-11-25 RX ORDER — AMIODARONE HYDROCHLORIDE 200 MG/1
200 TABLET ORAL DAILY
Qty: 30 TABLET | Refills: 11 | Status: SHIPPED | OUTPATIENT
Start: 2024-11-26 | End: 2025-11-26

## 2024-11-25 RX ORDER — CETIRIZINE HYDROCHLORIDE 5 MG/1
5 TABLET ORAL DAILY
Status: DISCONTINUED | OUTPATIENT
Start: 2024-11-26 | End: 2024-11-25 | Stop reason: HOSPADM

## 2024-11-25 RX ORDER — ISOSORBIDE DINITRATE AND HYDRALAZINE HYDROCHLORIDE 37.5; 2 MG/1; MG/1
TABLET ORAL
Qty: 180 TABLET | Refills: 0 | Status: SHIPPED | OUTPATIENT
Start: 2024-11-25

## 2024-11-25 RX ORDER — BUMETANIDE 2 MG/1
2 TABLET ORAL 2 TIMES DAILY
Qty: 60 TABLET | Refills: 11 | Status: SHIPPED | OUTPATIENT
Start: 2024-11-25 | End: 2025-11-25

## 2024-11-25 RX ORDER — PRENATAL VIT 91/IRON/FOLIC/DHA 28-975-200
COMBINATION PACKAGE (EA) ORAL 2 TIMES DAILY
Qty: 30 G | Refills: 0 | Status: SHIPPED | OUTPATIENT
Start: 2024-11-25

## 2024-11-25 RX ADMIN — AMIODARONE HYDROCHLORIDE 200 MG: 200 TABLET ORAL at 08:11

## 2024-11-25 RX ADMIN — TIOTROPIUM BROMIDE INHALATION SPRAY 2 PUFF: 3.12 SPRAY, METERED RESPIRATORY (INHALATION) at 08:11

## 2024-11-25 RX ADMIN — CETIRIZINE HYDROCHLORIDE 10 MG: 10 TABLET, FILM COATED ORAL at 08:11

## 2024-11-25 RX ADMIN — APIXABAN 5 MG: 5 TABLET, FILM COATED ORAL at 08:11

## 2024-11-25 RX ADMIN — BUMETANIDE 2 MG: 1 TABLET ORAL at 08:11

## 2024-11-25 RX ADMIN — ISOSORBIDE MONONITRATE 30 MG: 30 TABLET, EXTENDED RELEASE ORAL at 08:11

## 2024-11-25 RX ADMIN — SEVELAMER CARBONATE 1600 MG: 800 TABLET, FILM COATED ORAL at 08:11

## 2024-11-25 RX ADMIN — EMPAGLIFLOZIN 10 MG: 10 TABLET, FILM COATED ORAL at 08:11

## 2024-11-25 RX ADMIN — ASPIRIN 81 MG: 81 TABLET, COATED ORAL at 08:11

## 2024-11-25 RX ADMIN — GUAIFENESIN 600 MG: 600 TABLET, EXTENDED RELEASE ORAL at 08:11

## 2024-11-25 RX ADMIN — FLUTICASONE PROPIONATE 100 MCG: 50 SPRAY, METERED NASAL at 08:11

## 2024-11-25 RX ADMIN — MICONAZOLE NITRATE: 20 CREAM TOPICAL at 08:11

## 2024-11-25 RX ADMIN — FERROUS SULFATE TAB EC 325 MG (65 MG FE EQUIVALENT) 1 EACH: 325 (65 FE) TABLET DELAYED RESPONSE at 08:11

## 2024-11-25 NOTE — PROGRESS NOTES
Pharmacist Renal Dose Adjustment Note    Hafsa Hawley is a 59 y.o. female being treated with cetirizine    Patient Data:    Vital Signs (Most Recent):  Temp: 98.1 °F (36.7 °C) (11/25/24 0809)  Pulse: 88 (11/25/24 0813)  Resp: 18 (11/25/24 0813)  BP: 130/77 (11/25/24 0809)  SpO2: 97 % (11/25/24 0813) Vital Signs (72h Range):  Temp:  [97.4 °F (36.3 °C)-99.1 °F (37.3 °C)]   Pulse:  []   Resp:  [16-19]   BP: (103-135)/(55-85)   SpO2:  [86 %-100 %]      Recent Labs   Lab 11/23/24  0441 11/24/24  0421 11/25/24  0407   CREATININE 3.1* 2.7* 2.9*     Serum creatinine: 2.9 mg/dL (H) 11/25/24 0407  Estimated creatinine clearance: 22.4 mL/min (A)    Cetirizine changed to 5 mg daily    Mango Wilson, Pharm.D., BCPS  [id number]

## 2024-11-25 NOTE — PLAN OF CARE
Problem: Adult Inpatient Plan of Care  Goal: Plan of Care Review  11/25/2024 0451 by Gabriela Banegas RN  Outcome: Progressing  11/25/2024 0451 by Gabriela Banegas RN  Outcome: Progressing  11/25/2024 0450 by Gabriela Banegas RN  Outcome: Progressing  11/25/2024 0447 by Gabriela Banegas RN  Outcome: Progressing     Problem: Adult Inpatient Plan of Care  Goal: Patient-Specific Goal (Individualized)  11/25/2024 0451 by Gabriela Banegas RN  Outcome: Progressing  11/25/2024 0451 by Gabriela Banegas RN  Outcome: Progressing  11/25/2024 0450 by Gabriela Banegas RN  Outcome: Progressing  11/25/2024 0447 by Gabriela Banegas RN  Outcome: Progressing     Problem: Adult Inpatient Plan of Care  Goal: Readiness for Transition of Care  11/25/2024 0451 by Gabriela Banegas RN  Outcome: Progressing  11/25/2024 0451 by Gabriela Banegas RN  Outcome: Progressing  11/25/2024 0450 by Gabriela Banegas RN  Outcome: Progressing  11/25/2024 0447 by Gabriela Banegas RN  Outcome: Progressing  Plan of care discussed with patient. Patient on 2Lnc with SpO2 93% at rest. Educated on fall risk and importance of using call bell and bedside commode. Scheduled and PRN analgesics given for pain. Discussed medications and care.

## 2024-11-25 NOTE — PLAN OF CARE
[facility] - Cardiology Stepdown    HOME HEALTH ORDERS  FACE TO FACE ENCOUNTER    Patient Name: Hafsa Hawley  YOB: 1965    PCP: Cristobal Ann MD   PCP Address: [address] [place] [address] / [place] [address]  PCP Phone Number: [phone]  PCP Fax: [phone]    Discharging Team(s):    [facility] HOSP MED C  [facility] CARDIOLOGY TEAM C - FELLOWS/CONSULTS    Encounter Date: 11/25/2024    Admit to Home Health    Diagnoses:  Active Hospital Problems    Diagnosis  POA    Tinea pedis of both feet [B35.3]  Yes     Chronic     Ms. Sales is a 60yo F with history of severe CHF (EF 10-15%) with ICD, AFib and VTach, CKD 4, T2DM not on insulin, restrictive lung disease, anemia, chronic pain on opioids, chronic prescription benzodiazepine use who is admitted 11/18/24 from heart failure clinic for CHF exacerbation. Dermatology has been consulted for 1 month history of pruritic and burning rash on the b/l feet. Exam consistent with tinea pedis, confirmed on KOH at bedside. Symptoms of pruritus and burning can be associated with this rash, may also consider a component of paresthesias from her chronic diabetic neuropathy (for which patient on pregabalin).       Prurigo [L28.2]  Yes     Chronic    Goals of care, counseling/discussion [Z71.89]  Not Applicable    Anxiety [F41.9]  Yes    ACP (advance care planning) [Z71.89]  Not Applicable    Chronic pain with opioid use [F11.20]  Yes     Chronic    Type 2 diabetes mellitus without complication, without long-term current use of insulin [E11.9]  Yes     Chronic    End stage heart failure [I50.84]  Yes     Chronic     -HFrEF (EF 10-15%) on palliative   -Deemed not a candidate for OHTx/LVAD during committee meeting 3/2023 (poor medical adherence, declined pulmonary function, and declined renal function)  -Palliative care consulted      Hyperlipidemia associated with type 2 diabetes mellitus [E11.69, E78.5]  Yes     Chronic    Anemia of chronic disease [D63.8]  Yes      "Chronic    Pain [R52]  Yes    Paroxysmal A-fib [I48.0]  Yes     Chronic    Pulmonary HTN [I27.20]  Yes     Chronic    CKD (chronic kidney disease), stage IV [N18.4]  Yes     Chronic    Chronic prescription benzodiazepine use [Z79.899]  Not Applicable     IMO Regualtory Update 4/1/23      Chronic respiratory failure with hypoxia [J96.11]  Yes    Dyspnea [R06.00]  Yes    Paroxysmal supraventricular tachycardia [I47.10]  Yes     Chronic    ICD (implantable cardioverter-defibrillator) in place [Z95.810]  Yes     Chronic     Biotronik ICD      Depression due to physical illness [F06.31]  Yes     Chronic    NICM (nonischemic cardiomyopathy) [I42.8]  Yes     Chronic    MELISSA (obstructive sleep apnea) [G47.33]  Yes     Chronic    Essential hypertension [I10]  Yes     Chronic      Resolved Hospital Problems    Diagnosis Date Resolved POA    *Acute on chronic combined systolic and diastolic heart failure [I50.43] 11/25/2024 Yes     Priority: 1 - High     Chronic    Acute on chronic combined systolic (congestive) and diastolic (congestive) heart failure [I50.43] 11/25/2024 Yes       Future Appointments   Date Time Provider Department Center   12/2/2024 12:30 PM CHAIR 02 [facility] CHE[facility]   2/10/2025  3:00 PM Sandra Hernandes MD [facility]   3/3/2025  1:00 PM Vijay Basurto MD [facility] NEPHRO [facility]           I have seen and examined this patient face to face today. My clinical findings that support the need for the home health skilled services and home bound status are the following:  Weakness/numbness causing balance and gait disturbance due to Heart Failure and Weakness/Debility making it taxing to leave home.    Allergies:  Review of patient's allergies indicates:   Allergen Reactions    Penicillins Hives and Other (See Comments)    Iodinated contrast media Nausea And Vomiting    Oxycodone-acetaminophen Other (See Comments)     Nausea, Dizziness, Anxiety.  "I don't like how it makes me feel."   " Given Hydromorphone 0.5mg IVP  Without problems.  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 Metabolism of other drugs not listed here may also be impacted by UGT1A1 enzyme activity.       Tramadol Nausea And Vomiting and Other (See Comments)     Other reaction(s): Other (See Comments)    Valsartan Other (See Comments)       Diet: cardiac diet and fluid restriction: 1.2L    Activities: activity as tolerated    Nursing:   SN to complete comprehensive assessment including routine vital signs. Instruct on disease process and s/s of complications to report to MD. Review/verify medication list sent home with the patient at time of discharge  and instruct patient/caregiver as needed. Frequency may be adjusted depending on start of care date.    Notify MD if SBP > 160 or < 90; DBP > 90 or < 50; HR > 120 or < 50; Temp > 101; Other:         CONSULTS:    Physical Therapy to evaluate and treat. Evaluate for home safety and equipment needs; Establish/upgrade home exercise program. Perform / instruct on therapeutic exercises, gait training, transfer training, and Range of Motion.  Occupational Therapy to evaluate and treat. Evaluate home environment for safety and equipment needs. Perform/Instruct on transfers, ADL training, ROM, and therapeutic exercises.   to evaluate for community resources/long-range planning.  Aide to provide assistance with personal care, ADLs, and  vital signs.    MISCELLANEOUS CARE:  Home Infusion Therapy:   SN to perform Infusion Therapy/Central Line Care.  Review Central Line Care & Central Line Flush with patient.    Administer (drug and dose): DOBUTamine 1,000 mg/250 mL (4,000 mcg/mL) infusion   5 mcg/kg/min   Last dose given: 11/25/24                         Home dose due: 11/25/24    Scrub the Hub: Prior to accessing the line, always perform a 30 second alcohol scrub  Each lumen of the central line is to be flushed at least daily with 10 mL Normal Saline and 3 mL Heparin flush (10 units/mL)  Skilled Nurse (SN) may draw blood from IV access  Blood Draw Procedure:   - Aspirate at least 5 mL of blood   - Discard   - Obtain specimen   - Change injection cap   - Flush with 20 mL Normal Saline followed by a                 3-5 mL Heparin flush (10 units/mL)  Central :   - Sterile dressing changes are done weekly and as needed.   - Use chlor-hexadine scrub to cleanse site, apply Biopatch to insertion site,       apply securement device dressing   - Injection caps are changed weekly and after EVERY lab draw.   - If sterile gauze is under dressing to control oozing,                 dressing change must be performed every 24 hours until gauze is not needed.    WOUND CARE ORDERS  no      Medications: Review discharge medications with patient and family and provide education.      Current Discharge Medication List        START taking these medications    Details   calamine-zinc oxide 8-8% 8-8 % Lotn Apply topically 4 (four) times daily as needed.  Qty: 177 mL, Refills: 0      terbinafine HCL (LAMISIL) 1 % cream Apply topically 2 (two) times daily.  Qty: 12 g, Refills: 0           CONTINUE these medications which have CHANGED    Details   amiodarone (PACERONE) 200 MG Tab Take 1 tablet (200 mg total) by mouth once daily.  Qty: 30 tablet, Refills: 11      apixaban (ELIQUIS) 5 mg Tab Take 1 tablet (5 mg total) by mouth 2 (two) times a day for 30  days.  Qty: 60 tablet, Refills: 0      bumetanide (BUMEX) 2 MG tablet Take 1 tablet (2 mg total) by mouth 2 (two) times a day.  Qty: 60 tablet, Refills: 11      empagliflozin (JARDIANCE) 10 mg tablet Take 1 tablet (10 mg total) by mouth once daily.  Qty: 30 tablet, Refills: 1      isosorbide-hydrALAZINE 20-37.5 mg (BIDIL) 20-37.5 mg Tab take 1 tablet orally twice a day  Qty: 180 tablet, Refills: 0      metOLazone (ZAROXOLYN) 5 MG tablet Take 1 tablet (5 mg total) by mouth daily as needed (Excess fluid).  Qty: 30 tablet, Refills: 1           CONTINUE these medications which have NOT CHANGED    Details   acetaminophen (TYLENOL) 650 MG TbSR Take 1 tablet (650 mg total) by mouth every 8 (eight) hours.  Qty: 21 tablet, Refills: 0      albuterol-ipratropium (DUO-NEB) 2.5 mg-0.5 mg/3 mL nebulizer solution Take 3 mLs by nebulization every 6 (six) hours as needed for Wheezing or Shortness of Breath.  Qty: 90 mL, Refills: 2      ALPRAZolam (XANAX) 2 MG Tab Take 1 tablet (2 mg total) by mouth 2 (two) times daily as needed.  Qty: 60 tablet, Refills: 5    Associated Diagnoses: JULIETTE (generalized anxiety disorder)      aspirin (ECOTRIN) 81 MG EC tablet Take 81 mg by mouth once daily.      atorvastatin (LIPITOR) 40 MG tablet take one tablet by mouth in the evening  Qty: 90 tablet, Refills: 4      cetirizine (ZYRTEC) 10 MG tablet Take 1 tablet (10 mg total) by mouth once daily.  Qty: 30 tablet, Refills: 12      cyanocobalamin (VITAMIN B-12) 1000 MCG tablet Take 1,000 mcg by mouth once daily.      DOBUTamine (DOBUTREX) 1,000 mg/250 mL (4,000 mcg/mL) infusion Inject 409 mcg/min into the vein continuous.      ergocalciferol (VITAMIN D2) 50,000 unit Cap Take 1 capsule orally once a  week  Qty: 4 capsule, Refills: 11      ferrous sulfate 325 (65 FE) MG EC tablet Take 325 mg by mouth once daily.      fluticasone propionate (FLONASE ALLERGY RELIEF) 50 mcg/actuation nasal spray Use 2 sprays (100 mcg total) by Each Nostril route once  daily.  Qty: 16 g, Refills: 12      glimepiride (AMARYL) 1 MG tablet Take 1 tablet (1 mg total) by mouth before breakfast.  Qty: 90 tablet, Refills: 3    Associated Diagnoses: Type 2 diabetes mellitus with stage 4 chronic kidney disease, without long-term current use of insulin      HYDROcodone-acetaminophen (NORCO)  mg per tablet Take 1 tablet by mouth every 6 (six) hours as needed for Pain.  Qty: 120 tablet, Refills: 0    Comments: Quantity prescribed more than 7 day supply? Yes, quantity medically necessary  Associated Diagnoses: Pain      hydrOXYzine HCL (ATARAX) 25 MG tablet take one tablet orally four times a day as needed for itching  Qty: 120 tablet, Refills: 0      LIDOcaine (LIDODERM) 5 % Place 1 patch onto the skin once daily. Remove & discard patch within 12 hours or as directed by MD.  Qty: 30 patch, Refills: 1      neomycin-polymyxin-dexamethasone (DEXACINE) 3.5 mg/g-10,000 unit/g-0.1 % Oint Place 1-2 drops into each eye three times daily for 7-10 days  Qty: 3.5 g, Refills: 2    Associated Diagnoses: Eye irritation      nitroGLYCERIN (NITROSTAT) 0.4 MG SL tablet Put 1 tab under the tongue every 5 minutes x 3 doses as needed for chest pain. Do not exceed 3 doses in 15 minutes. If no relief, go to ER.  Qty: 25 tablet, Refills: 4      ondansetron (ZOFRAN-ODT) 8 MG TbDL Dissolve 1 tablet (8 mg total) by mouth every 8 (eight) hours as needed (nausea).  Qty: 30 tablet, Refills: 4    Associated Diagnoses: Nausea      pantoprazole (PROTONIX) 40 MG tablet Take one tablet by mouth daily  Qty: 90 tablet, Refills: 4      sevelamer carbonate (RENVELA) 800 mg Tab take FOUR tablets orally three times a day with meals  Qty: 1080 tablet, Refills: 0      SPIRIVA WITH HANDIHALER 18 mcg inhalation capsule Inhale 1 capsule (18 mcg total) into the lungs once daily.  Qty: 30 capsule, Refills: 5      VENTOLIN HFA 90 mcg/actuation inhaler Inhale 2 puffs into the lungs every 6 (six) hours as needed for Shortness of Breath  or Wheezing.  Qty: 18 g, Refills: 11           STOP taking these medications       metoprolol succinate (TOPROL-XL) 25 MG 24 hr tablet Comments:   Reason for Stopping:         torsemide (DEMADEX) 100 MG Tab Comments:   Reason for Stopping:               I certify that this patient is confined to her home and needs intermittent skilled nursing care, physical therapy, and occupational therapy.

## 2024-11-25 NOTE — PLAN OF CARE
Patient is ready for discharge. Patient stable alert and oriented. Tele removed, KELIN PICC in place. No complaints of pain. Discussed discharge plan. Reviewed medications and side effects, appointments, and answered questions with patient and family. Home  given back to pt.      Problem: Adult Inpatient Plan of Care  Goal: Plan of Care Review  Outcome: Met  Goal: Patient-Specific Goal (Individualized)  Outcome: Met  Goal: Absence of Hospital-Acquired Illness or Injury  Outcome: Met  Goal: Optimal Comfort and Wellbeing  Outcome: Met  Goal: Readiness for Transition of Care  Outcome: Met     Problem: Diabetes Comorbidity  Goal: Blood Glucose Level Within Targeted Range  Outcome: Met     Problem: Coping Ineffective  Goal: Effective Coping  Outcome: Met     Problem: Infection  Goal: Absence of Infection Signs and Symptoms  Outcome: Met     Problem: Heart Failure  Goal: Optimal Coping  Outcome: Met  Goal: Optimal Cardiac Output  Outcome: Met  Goal: Stable Heart Rate and Rhythm  Outcome: Met  Goal: Optimal Functional Ability  Outcome: Met  Goal: Fluid and Electrolyte Balance  Outcome: Met  Goal: Improved Oral Intake  Outcome: Met  Goal: Effective Oxygenation and Ventilation  Outcome: Met  Goal: Effective Breathing Pattern During Sleep  Outcome: Met     Problem: Skin Injury Risk Increased  Goal: Skin Health and Integrity  Outcome: Met     Problem: Fall Injury Risk  Goal: Absence of Fall and Fall-Related Injury  Outcome: Met

## 2024-11-25 NOTE — PT/OT/SLP EVAL
"Occupational Therapy  Co Evaluation, treatment and d/c     Name: Hafsa Hawley  MRN: [id number]  Admitting Diagnosis: Acute on chronic combined systolic and diastolic heart failure    Recommendations:     Discharge Recommendations: No Therapy Indicated  Discharge Equipment Recommendations:   wheelchair     Assessment:     Hafsa Hawley is a 59 y.o. female with a medical diagnosis of Acute on chronic combined systolic and diastolic heart failure.  Pt tolerated session well with good effort and performance. No further OT services warranted. Pt performing at prior functional performance.     Plan:     D/C OT 11/25/2024     Subjective     Pt agreeable to therapy.   "I have not walked yet" pt states.     Occupational Profile:  Pt resides with spouse in a mobile home with 5 NIRAJ and R HR.   Pt has broken rollator, w/c, SPC, BSC.   Pt reports she mostly uses the SPC and requests a w/c  for community.   Pt with home oxygen.   Spouse home daily and can assist as needed.     Pain/Comfort:  Pain Rating 1: 10/10  Location - Side 1: Right  Location 1: foot  Pain Addressed 1: Reposition, Distraction    Patients cultural, spiritual, [employer]ian conflicts given the current situation: no    Objective:     Communicated with: nsg prior to session.  Patient found seated EOB with spouse in room. Pt with tele, PICC, oxygen in task.     General Precautions: Standard, fall    Occupational Performance:    Functional Mobility/Transfers:  Sit>stand with SBA  Functional mobility with RW iwth SBA  Sit>stand with SBA     Activities of Daily Living:  Feeding: independent  G/H: standing with SBA  Toileting simulated with SBA  LE dressing; SBA     Cognitive/Visual Perceptual:  Pt awake, alert and following commands. Pt with pleasant mood/affect.     Physical Exam:  Pt is right hand dominant and demo WFL UE strength/ROM    AMPAC 6 Click ADL:  AMPAC Total Score: 19    Treatment & Education:  Pt completed functional mobility in room with SBA and " RW. Pt without LOB or SOB.   Education provided re: role of OT and safety with functional mobility/ADL skills.   Pt appropriate to continued with mobility/ADL performance with nsg supervision.       Patient left up in chair with all lines intact, call button in reach, and spouse present    GOALS:   Multidisciplinary Problems       Occupational Therapy Goals          Problem: Occupational Therapy    Goal Priority Disciplines Outcome Interventions   Occupational Therapy Goal     OT, PT/OT                         History:     Past Medical History:   Diagnosis Date    Allergy     Anemia     Arthritis     Atrial fibrillation     OAC    BMI 32.0-32.9,adult 02/22/2023    Chronic respiratory failure with hypoxia, on home oxygen therapy     2L with activity, off at rest.  Per Pulm  no overt evidence of ILD or COPD on PFTs and CT to explain O2 needs.    CKD (chronic kidney disease), stage IV 05/08/2018    Congestive heart failure     s/p AICD placement,    Deep vein thrombosis     Depression     elevated bilirubin d/t Gilbert's syndrome     confirmed by [place] genetic testing, evaluated by hepatology    Encounter for blood transfusion     GERD (gastroesophageal reflux disease)     Hypertension     Pheochromocytoma, malignant     Right kidney mass     Sleep apnea     Thalassemia trait, alpha     Thyroid disease     Type 2 diabetes mellitus with hyperglycemia, without long-term current use of insulin 08/13/2020         Past Surgical History:   Procedure Laterality Date    ANGIOGRAM, ABDOMINAL AORTA Right 04/15/2024    APPENDECTOMY      BONE MARROW BIOPSY      CARDIAC DEFIBRILLATOR PLACEMENT Left 12/2016    CARDIAC ELECTROPHYSIOLOGY MAPPING AND ABLATION      CARDIAC ELECTROPHYSIOLOGY MAPPING AND ABLATION      COLONOSCOPY N/A 05/06/2022    Procedure: COLONOSCOPY;  Surgeon: Arely Betancourt MD;  Location: [facility] ([address] [place] [address]);  Service: Endoscopy;  Laterality: N/A;  heart transplant candidate/ EF 25% - 2nd floor/ defib - Biotronik -  SARAH Hughes - per Dr. Cortez with [facility], Pt ok to hold Eliquis x 2 days prior-see media tab-outside correspondence dated 12/30/21  - ERW  verbal instructions/portal instructions/email instructions - s    EYE SURGERY      due to running tears    FRACTURE SURGERY Left     hand 5th digit    HYSTERECTOMY      KNEE SURGERY Left 2016    hematoma    LIVER BIOPSY  10/24/2018    Minimal steatosis, predominantly macrovesicular, 1%, Minimal nonspecific portal inflammation, no fibrosis. No findings on biopsy to explain elevated bilirubin levels. Could be d/t Gilbert's =?- hemolysis    RIGHT HEART CATHETERIZATION Right 12/07/2021    Procedure: INSERTION, CATHETER, RIGHT HEART;  Surgeon: Irving Cardenas MD;  Location: [facility] CATH LAB;  Service: Cardiology;  Laterality: Right;    RIGHT HEART CATHETERIZATION Right 12/19/2022    Procedure: INSERTION, CATHETER, RIGHT HEART;  Surgeon: Burke Camilo MD;  Location: [facility] CATH LAB;  Service: Cardiology;  Laterality: Right;    RIGHT HEART CATHETERIZATION Right 03/29/2023    Procedure: INSERTION, CATHETER, RIGHT HEART;  Surgeon: Katie Liriano DO;  Location: [facility] CATH LAB;  Service: Cardiology;  Laterality: Right;    TRANSJUGULAR BIOPSY OF LIVER N/A 10/24/2018    Procedure: BIOPSY, LIVER, TRANSJUGULAR APPROACH;  Surgeon: [facility] Diagnostic Provider;  Location: [facility] OR [address] [place] [address];  Service: Radiology;  Laterality: N/A;       Time Tracking:     OT Date of Treatment: 11/25/24  OT Start Time: 1315  OT Stop Time: 1340  OT Total Time (min): 25 min    Billable Minutes:Evaluation 15  Self Care/Home Management 10    11/25/2024

## 2024-11-25 NOTE — CARE UPDATE
Unit [facility] Care Support Interaction      I have reviewed the chart of Hafsa Hawley who is hospitalized for Acute on chronic combined systolic and diastolic heart failure. The patient is currently located in the following unit: CSU       I have seen and examined the patient and provided the following support:     Patient experience rounds - positive experience reported       Yanely Garg PA-C  Unit Based [facility]

## 2024-11-25 NOTE — PROGRESS NOTES
[facility]  Cardiology  Progress Note    Patient Name: Hafsa Hawley  MRN: [id number]  Admission Date: 11/18/2024  Hospital Length of Stay: 7 days  Code Status: Full Code   Attending Physician: Denny Elias DO   Primary Care Physician: Cristobal Ann MD  Expected Discharge Date: 11/25/2024  Principal Problem:Acute on chronic combined systolic and diastolic heart failure    Subjective:     Hospital Course:   No notes on file    Interval History: NAEON, VSS was transitioned to bumex from lasix gtt, good urine output.    ROS  Objective:     Vital Signs (Most Recent):  Temp: 98.5 °F (36.9 °C) (11/25/24 1137)  Pulse: 94 (11/25/24 1137)  Resp: 18 (11/25/24 1137)  BP: 139/70 (11/25/24 1137)  SpO2: 98 % (11/25/24 1137) Vital Signs (24h Range):  Temp:  [97.5 °F (36.4 °C)-98.5 °F (36.9 °C)] 98.5 °F (36.9 °C)  Pulse:  [81-96] 94  Resp:  [17-18] 18  SpO2:  [96 %-98 %] 98 %  BP: (116-139)/(61-85) 139/70     Weight: 80.6 kg (177 lb 11.1 oz)  Body mass index is 28.68 kg/m².     SpO2: 98 %         Intake/Output Summary (Last 24 hours) at 11/25/2024 1139  Last data filed at 11/25/2024 0815  Gross per 24 hour   Intake 1358.3 ml   Output 1550 ml   Net -191.7 ml       Lines/Drains/Airways       Peripherally Inserted Central Catheter Line  Duration             PICC Double Lumen 05/12/23 1534 right brachial 562 days                       Physical Exam     Constitutional:       Appearance: Normal appearance. She is obese.   Eyes:      Pupils: Pupils are equal, round, and reactive to light.   Neck:      Comments: JVD present   Cardiovascular:      Rate and Rhythm: Normal rate and regular rhythm.      Pulses: Normal pulses.      Heart sounds: Murmur heard.   Pulmonary:      Effort: Pulmonary effort is normal.   Abdominal:      General: Abdomen is flat.      Palpations: Abdomen is soft.   Musculoskeletal:      Cervical back: Normal range of motion.      Right lower leg: Edema(improved)present.      Left lower  leg: Edema(improved) present.   Neurological:      General: No focal deficit present.      Mental Status: She is alert and oriented to person, place, and time.   Psychiatric:         Mood and Affect: Mood normal.         Behavior: Behavior normal.   Significant Labs: All pertinent lab results from the last 24 hours have been reviewed.    Significant Imaging: Echocardiogram: Transthoracic echo (TTE) complete (Cupid Only):   Results for orders placed or performed during the hospital encounter of 11/18/24   Echo   Result Value Ref Range    LV Diastolic Volume 227.02 mL    Echo EF Estimated 21 %    LV Systolic Volume 179.58 mL    IVS 1.1 0.6 - 1.1 cm    LVIDd 6.6 (A) 3.5 - 6.0 cm    LVIDs 6.0 (A) 2.1 - 4.0 cm    LVOT diameter 2.0 cm    PW 1.2 (A) 0.6 - 1.1 cm    AV LVOT peak gradient 2 mmHg    LVOT mn grad 1 mmHg    LVOT peak marcos 0.7 m/s    LVOT peak VTI 12.7 cm    RV- carrillo basal diam 6.6 cm    RV S' 10.22 cm/s    LA size 5.50 cm    Left Atrium Major Axis 7.04 cm    Left Atrium Minor Axis 7.01 cm    LA Vol (MOD) 148.46 mL    RA Major Axis 6.92 cm    RA Area 32.2 cm2    AV valve area 2.2 cm2    AV area by cont VTI 2.1 cm2    AV peak gradient 4.8 mmHg    AV mean gradient 2.3 mmHg    Ao peak marcos 1.1 m/s    Ao VTI 18.2 cm    MV Peak A Marcos 0.27 m/s    E wave deceleration time 185.89 ms    MV Peak E Marcos 0.48 m/s    E/A ratio 1.78     LV LATERAL E/E' RATIO 6.00     LV SEPTAL E/E' RATIO 9.60     TDI LATERAL 0.08 m/s    TDI SEPTAL 0.05 m/s    TV peak gradient 42 mmHg    TR Max Marcos 3.24 m/s    Ascending aorta 3.77 cm    STJ 2.39 cm    IVC diameter 1.99 cm    Sinus 3.35 cm    LA WIDTH 4.90 cm    RA Width 4.52 cm    TAPSE 1.37 cm    BSA 2.01 m2    LVOT stroke volume 39.9 cm3    LV Systolic Volume Index 91.2 mL/m2    LV Diastolic Volume Index 115.24 mL/m2    LVOT area 3.1 cm2    FS 9.1 (A) 28 - 44 %    Left Ventricle Relative Wall Thickness 0.36 cm    LV mass 347.9 g    LV Mass Index 176.6 g/m2    E/E' ratio 7.38 m/s    JEREL 81.7  mL/m2    LA Vol 160.92 cm3    RV/LV Ratio 1.00 cm    JEREL (MOD) 75.4 mL/m2    AV Velocity Ratio 0.64     AV index (prosthetic) 0.70     MAMADOU by Velocity Ratio 2.0 cm²    Triscuspid Valve Regurgitation Peak Gradient 42 mmHg    Mean e' 0.07 m/s    ZLVIDS 4.31     ZLVIDD 1.53     TV resting pulmonary artery pressure 57 mmHg    RV TB RVSP 18 mmHg    Est. RA pres 15 mmHg    Narrative      Left Ventricle: The left ventricle is severely dilated. Normal wall   thickness but prominent trabeculations are seen in the apex. There is   eccentric hypertrophy. Severe global hypokinesis present. There is   severely reduced systolic function with a visually estimated ejection   fraction of 15 - 20%. Grade II diastolic dysfunction.    Right Ventricle: Severe right ventricular enlargement. Wall thickness   is normal. Systolic function is moderately reduced. Pacemaker lead present   in the ventricle.    Severe biatrial enlargement    Aortic Valve: The aortic valve is a trileaflet valve. There is mild   aortic valve sclerosis. Mildly restricted motion.    Mitral Valve: There is moderate regurgitation.    Tricuspid Valve: There is mild to moderate regurgitation.    Pulmonary Artery: There is moderate pulmonary hypertension. The   estimated pulmonary artery systolic pressure is 57 mmHg.    IVC/SVC: Elevated venous pressure at 15 mmHg.    There is a small posterior pericardial effusion. Ascites is also   present.      and EKG:   Assessment and Plan:     Brief HPI:     Chandan is a 59 y.o. female with severe combined CHF (EF 10-15%) with ICD in place, AFib and VTach, CKD 4, T2DM not on insulin, restrictive lung disease, MELISSA, anemia, chronic pain on opioids, chronic prescription benzodiazepine use who presents from heart failure clinic for CHF exacerbation. She is on palliative dobutamine and non compliance has been an issue in the past. She was refused a heart transplant for non compliance and poor renal and pulmonary functions.    * Acute on  chronic combined systolic and diastolic heart failure  Acute on chronic systolic HF, NYHA class III, stage D. On home dobutamine 5 mcg/kg/min. Etiology: NICM.  Hemodynamic status: warm, normotensive, severely hypervolemic.Patient not in cardiogenic shock given above profile and negative lactate        Plan:  --Continue home dose of Bumex and PRN metolazone with follow up BMP in a week  --Continue palliative dobutmaine  --Continue Jardiance as it was continued outpatient by heart failure clinic but in the setting of low GFR there is need for reassessment outpatient whether jardiance need to be continued.  --Outpatient follow up with heart failure transitional care clinic and palliative.  --Discharge patient with home health  -- HF Pathway: Sodium restriction <2 g, Fluid restriction < 1500 mL, Daily weights     Cardiology will sign off.           VTE Risk Mitigation (From admission, onward)           Ordered     apixaban tablet 5 mg  2 times daily         11/19/24 1214     Reason for No Pharmacological VTE Prophylaxis  Once        Question:  Reasons:  Answer:  Already adequately anticoagulated on oral Anticoagulants    11/18/24 1904     IP VTE HIGH RISK PATIENT  Once         11/18/24 1904     Place sequential compression device  Until discontinued         11/18/24 1904                    Mary Reyes MD  Cardiology  [facility]

## 2024-11-25 NOTE — NURSING
Pt received meds to bedside. AVS reviewed with pt. Wheeled down with home oxygen and all belongings.

## 2024-11-25 NOTE — SUBJECTIVE & OBJECTIVE
Interval History: NAMARYAM, VSS was transitioned to bumex from lasix gtt, good urine output.    ROS  Objective:     Vital Signs (Most Recent):  Temp: 98.5 °F (36.9 °C) (11/25/24 1137)  Pulse: 94 (11/25/24 1137)  Resp: 18 (11/25/24 1137)  BP: 139/70 (11/25/24 1137)  SpO2: 98 % (11/25/24 1137) Vital Signs (24h Range):  Temp:  [97.5 °F (36.4 °C)-98.5 °F (36.9 °C)] 98.5 °F (36.9 °C)  Pulse:  [81-96] 94  Resp:  [17-18] 18  SpO2:  [96 %-98 %] 98 %  BP: (116-139)/(61-85) 139/70     Weight: 80.6 kg (177 lb 11.1 oz)  Body mass index is 28.68 kg/m².     SpO2: 98 %         Intake/Output Summary (Last 24 hours) at 11/25/2024 1139  Last data filed at 11/25/2024 0815  Gross per 24 hour   Intake 1358.3 ml   Output 1550 ml   Net -191.7 ml       Lines/Drains/Airways       Peripherally Inserted Central Catheter Line  Duration             PICC Double Lumen 05/12/23 1534 right brachial 562 days                       Physical Exam     Constitutional:       Appearance: Normal appearance. She is obese.   Eyes:      Pupils: Pupils are equal, round, and reactive to light.   Neck:      Comments: JVD present   Cardiovascular:      Rate and Rhythm: Normal rate and regular rhythm.      Pulses: Normal pulses.      Heart sounds: Murmur heard.   Pulmonary:      Effort: Pulmonary effort is normal.   Abdominal:      General: Abdomen is flat.      Palpations: Abdomen is soft.   Musculoskeletal:      Cervical back: Normal range of motion.      Right lower leg: Edema(improved)present.      Left lower leg: Edema(improved) present.   Neurological:      General: No focal deficit present.      Mental Status: She is alert and oriented to person, place, and time.   Psychiatric:         Mood and Affect: Mood normal.         Behavior: Behavior normal.   Significant Labs: All pertinent lab results from the last 24 hours have been reviewed.    Significant Imaging: Echocardiogram: Transthoracic echo (TTE) complete (Cupid Only):   Results for orders placed or  performed during the hospital encounter of 11/18/24   Echo   Result Value Ref Range    LV Diastolic Volume 227.02 mL    Echo EF Estimated 21 %    LV Systolic Volume 179.58 mL    IVS 1.1 0.6 - 1.1 cm    LVIDd 6.6 (A) 3.5 - 6.0 cm    LVIDs 6.0 (A) 2.1 - 4.0 cm    LVOT diameter 2.0 cm    PW 1.2 (A) 0.6 - 1.1 cm    AV LVOT peak gradient 2 mmHg    LVOT mn grad 1 mmHg    LVOT peak marcos 0.7 m/s    LVOT peak VTI 12.7 cm    RV- carrillo basal diam 6.6 cm    RV S' 10.22 cm/s    LA size 5.50 cm    Left Atrium Major Axis 7.04 cm    Left Atrium Minor Axis 7.01 cm    LA Vol (MOD) 148.46 mL    RA Major Axis 6.92 cm    RA Area 32.2 cm2    AV valve area 2.2 cm2    AV area by cont VTI 2.1 cm2    AV peak gradient 4.8 mmHg    AV mean gradient 2.3 mmHg    Ao peak marcos 1.1 m/s    Ao VTI 18.2 cm    MV Peak A Marcos 0.27 m/s    E wave deceleration time 185.89 ms    MV Peak E Marcos 0.48 m/s    E/A ratio 1.78     LV LATERAL E/E' RATIO 6.00     LV SEPTAL E/E' RATIO 9.60     TDI LATERAL 0.08 m/s    TDI SEPTAL 0.05 m/s    TV peak gradient 42 mmHg    TR Max Marcos 3.24 m/s    Ascending aorta 3.77 cm    STJ 2.39 cm    IVC diameter 1.99 cm    Sinus 3.35 cm    LA WIDTH 4.90 cm    RA Width 4.52 cm    TAPSE 1.37 cm    BSA 2.01 m2    LVOT stroke volume 39.9 cm3    LV Systolic Volume Index 91.2 mL/m2    LV Diastolic Volume Index 115.24 mL/m2    LVOT area 3.1 cm2    FS 9.1 (A) 28 - 44 %    Left Ventricle Relative Wall Thickness 0.36 cm    LV mass 347.9 g    LV Mass Index 176.6 g/m2    E/E' ratio 7.38 m/s    JEREL 81.7 mL/m2    LA Vol 160.92 cm3    RV/LV Ratio 1.00 cm    JEREL (MOD) 75.4 mL/m2    AV Velocity Ratio 0.64     AV index (prosthetic) 0.70     MAMADOU by Velocity Ratio 2.0 cm²    Triscuspid Valve Regurgitation Peak Gradient 42 mmHg    Mean e' 0.07 m/s    ZLVIDS 4.31     ZLVIDD 1.53     TV resting pulmonary artery pressure 57 mmHg    RV TB RVSP 18 mmHg    Est. RA pres 15 mmHg    Narrative      Left Ventricle: The left ventricle is severely dilated. Normal wall    thickness but prominent trabeculations are seen in the apex. There is   eccentric hypertrophy. Severe global hypokinesis present. There is   severely reduced systolic function with a visually estimated ejection   fraction of 15 - 20%. Grade II diastolic dysfunction.    Right Ventricle: Severe right ventricular enlargement. Wall thickness   is normal. Systolic function is moderately reduced. Pacemaker lead present   in the ventricle.    Severe biatrial enlargement    Aortic Valve: The aortic valve is a trileaflet valve. There is mild   aortic valve sclerosis. Mildly restricted motion.    Mitral Valve: There is moderate regurgitation.    Tricuspid Valve: There is mild to moderate regurgitation.    Pulmonary Artery: There is moderate pulmonary hypertension. The   estimated pulmonary artery systolic pressure is 57 mmHg.    IVC/SVC: Elevated venous pressure at 15 mmHg.    There is a small posterior pericardial effusion. Ascites is also   present.      and EKG:

## 2024-11-25 NOTE — DISCHARGE SUMMARY
Arie Vazquez - Cardiology [facility] Medicine  Discharge Summary      Patient Name: Hafsa Hawley  MRN: [id number]  GLENN: [id number]  Patient Class: IP- Inpatient  Admission Date: 11/18/2024  Hospital Length of Stay: 7 days  Discharge Date and Time:  11/25/2024 10:43 AM  Attending Physician: Denny Elias DO   Discharging Provider: Denny Elias DO  Primary Care Provider: Cristobal Ann MD  [facility] Medicine Team: [facility] MED C Denny Elias DO  Primary Care Team: [facility] MED C    HPI:   Hafsa Hawley is a 59 y.o. female with severe combined CHF (EF 10-15%) with ICD in place, AFib and VTach, CKD 4, T2DM not on insulin, restrictive lung disease, MELISSA, anemia, chronic pain on opioids, chronic prescription benzodiazepine use who presents from heart failure clinic for CHF exacerbation.     She reports that over the past few days, she has had an increase in her chronic shortness of breath particularly with exertion. She also notes lower extremity edema, which she usually doesn't get unless she is in severe CHF exacerbation. Has associated cough with chest pressure, which increases with cough.     She ran out of her Bumex due to the pharmacy not having it approximately one week ago. She has tried taking her Metolazone instead, which she usually takes 2-3 times per week PRN, but has been taking daily but has had persistent dyspnea and swelling despite this.     She presented to Cardiology clinic today, who recommended she be admitted for diuresis.     * No surgery found *      Hospital Course:   Referred from [facility] clinic due to concern for decompensated CHF. Not taking bumex x10 days. Cardiology consulted on arrival.  Started on IVP diuresis with minimal UOP. S/p lasix 80mg IVP x2, ultimately placed on lasix ggt. Given lack of candidacy for transplant, pt reportedly went to [place] for second opinion and was again denied for advanced options. Palliative care team following to discuss goals of care given  poor prognosis and lack of options.  Additionally consulted Psychiatry given depression like symptoms while pt navigates through GOC conversations. Lasix ggt increased to 30cc/hr with continuation of dobutamine ggt w/ much improved UOP.  Transitioned back to home Bumex 2 mg twice daily, tolerated. Additionally continuing on metolazone prn. Of note, in the interim dermatology was consulted for pruritic rash involving lower extremities.  Exam consistent with tinea pedis confirmed on KOH at bedside.  Started on terbinafine b.i.d. to affected areas and feet on discharge.  Stable for discharge with palliative and advanced CHF clinic f/u. Encouraged complaince with medications, refills sent to pharmacy.        Physical Exam  Gen: in NAD, ill appearing  Neuro: AAOx3, motor, sensory, and strength grossly intact BL  HEENT: NTNC, EOMI, PERRL, MMM  CVS: RRR, no m/r/g; S1/S2 auscultated with no S3 or S4; capillary refill < 2 sec  Resp: lungs CTAB, no w/r/r; no belabored breathing or accessory muscle use appreciated   Abd: BS+ in all 4 quadrants; NTND, soft to palpation; no organomegaly appreciated   Extrem: pulses full, equal, and regular over all 4 extremities; no UE or LE edema BL    Goals of Care Treatment Preferences:  Code Status: Full Code    Health care agent: Erika Estrada (daughter)  Health care agent number: [phone]          What is most important right now is to focus on TBD, pending further discussions with palliative care team.  Accordingly, we have decided that the best plan to meet the patient's goals includes continue treatment.         Consults:   Consults (From admission, onward)          Status Ordering Provider     Inpatient consult to Dermatology  Once        Provider:  (Not yet assigned)    Completed STELLA ACOSTA     Inpatient consult to PICC team ([facility])  Once        Provider:  (Not yet assigned)    Completed STELLA ACOSTA     Inpatient consult to Psychiatry  Once        Provider:  (Not yet  assigned)    Completed ALLIE RILEY     Inpatient consult to Social Work/Case Management  Once        Provider:  (Not yet assigned)    Acknowledged AMIRA LANE     Inpatient consult to Registered Dietitian/Nutritionist  Once        Provider:  (Not yet assigned)    Completed AMIRA LANE     Inpatient consult to Palliative Care  Once        Provider:  (Not yet assigned)    Completed AMIRA LANE     Inpatient consult to Cardiology  Once        Provider:  (Not yet assigned)    Completed AMIRA LANE            Cardiac/Vascular  * Acute on chronic combined systolic and diastolic heart failure-resolved as of 11/25/2024  -Patient with known severe CHF with last EF 10-15% and Grade 2 DD on TTE 5/2024, on chronic dobutamine gtt. Followed by advanced HF clinic, and has been considered for transplant however declined by 2 facilities. Presents with signs of volume overload on exam and worsening dyspnes, consistent with CHF exacerbation.   -TTE redemonstrating reduced EF although slighlty higher 15-20%. CVP 15mmHg  Plan:  -cardiology on board, appreciate assistance  -Discontinue lasix ggt today and transition to home diuretic regimen  -Monitor UOP closely  - Will continue home dobutamine as well. Continue GDMT as tolerated, although limited chronically due to advanced CKD and softer BPs.  - Unable to add BB 2/2 dobutamine.   - Jardiance added   -Will obtain daily weights and monitor renal function and electrolytes on serial labs. Strict intake and output along with fluid restricted diet. Monitor on telemetry.       Final Active Diagnoses:    Diagnosis Date Noted POA    Tinea pedis of both feet [B35.3] 11/23/2024 Yes     Chronic    Prurigo [L28.2] 11/23/2024 Yes     Chronic    Goals of care, counseling/discussion [Z71.89] 11/19/2024 Not Applicable    Anxiety [F41.9] 11/19/2024 Yes    ACP (advance care planning) [Z71.89] 11/19/2024 Not Applicable    Chronic pain with opioid use  [F11.20] 11/18/2024 Yes     Chronic    Type 2 diabetes mellitus without complication, without long-term current use of insulin [E11.9] 10/24/2024 Yes     Chronic    End stage heart failure [I50.84] 07/24/2024 Yes     Chronic    Hyperlipidemia associated with type 2 diabetes mellitus [E11.69, E78.5] 02/01/2024 Yes     Chronic    Anemia of chronic disease [D63.8] 10/05/2023 Yes     Chronic    Pain [R52] 06/29/2023 Yes    Paroxysmal A-fib [I48.0] 06/28/2023 Yes     Chronic    Pulmonary HTN [I27.20] 01/06/2023 Yes     Chronic    CKD (chronic kidney disease), stage IV [N18.4] 08/23/2022 Yes     Chronic    Chronic prescription benzodiazepine use [Z79.899] 05/17/2021 Not Applicable    Chronic respiratory failure with hypoxia [J96.11] 03/16/2021 Yes    Dyspnea [R06.00] 08/30/2018 Yes    Paroxysmal supraventricular tachycardia [I47.10] 08/20/2018 Yes     Chronic    ICD (implantable cardioverter-defibrillator) in place [Z95.810] 07/24/2018 Yes     Chronic    Depression due to physical illness [F06.31] 05/08/2018 Yes     Chronic    NICM (nonischemic cardiomyopathy) [I42.8] 10/27/2016 Yes     Chronic    MELISSA (obstructive sleep apnea) [G47.33] 08/23/2016 Yes     Chronic    Essential hypertension [I10] 07/22/2016 Yes     Chronic      Problems Resolved During this Admission:    Diagnosis Date Noted Date Resolved POA    PRINCIPAL PROBLEM:  Acute on chronic combined systolic and diastolic heart failure [I50.43] 03/16/2018 11/25/2024 Yes     Chronic    Acute on chronic combined systolic (congestive) and diastolic (congestive) heart failure [I50.43] 11/19/2024 11/25/2024 Yes       Discharged Condition: stable    Disposition:     Follow Up:    Patient Instructions:      Ambulatory referral/consult to CLINIC Palliative Care   Standing Status: Future   Referral Priority: Routine Referral Type: Consultation   Requested Specialty: Palliative Medicine   Number of Visits Requested: 1     Ambulatory referral/consult to Transplant, Heart    Standing Status: Future   Referral Priority: Routine Referral Type: Transplants   Number of Visits Requested: 1     Notify your health care provider if you experience any of the following:     Notify your health care provider if you experience any of the following:  increased confusion or weakness     Notify your health care provider if you experience any of the following:  persistent dizziness, light-headedness, or visual disturbances     Notify your health care provider if you experience any of the following:  worsening rash     Notify your health care provider if you experience any of the following:  severe persistent headache     Notify your health care provider if you experience any of the following:  difficulty breathing or increased cough     Notify your health care provider if you experience any of the following:  redness, tenderness, or signs of infection (pain, swelling, redness, odor or green/yellow discharge around incision site)     Notify your health care provider if you experience any of the following:  severe uncontrolled pain     Notify your health care provider if you experience any of the following:  persistent nausea and vomiting or diarrhea     Notify your health care provider if you experience any of the following:  temperature >100.4       Significant Diagnostic Studies: N/A    Pending Diagnostic Studies:       None           Medications:  Reconciled Home Medications:      Medication List        START taking these medications      calamine-zinc oxide 8-8% 8-8 % Lotn  Apply topically 4 (four) times daily as needed.     terbinafine HCL 1 % cream  Commonly known as: LAMISIL  Apply topically 2 (two) times daily.            CHANGE how you take these medications      amiodarone 200 MG Tab  Commonly known as: PACERONE  Take 1 tablet (200 mg total) by mouth once daily.  Start taking on: November 26, 2024  What changed:   how much to take  how to take this  when to take this  Another medication with  the same name was removed. Continue taking this medication, and follow the directions you see here.     bumetanide 2 MG tablet  Commonly known as: BUMEX  Take 1 tablet (2 mg total) by mouth 2 (two) times a day.  What changed:   how much to take  how to take this  when to take this  Another medication with the same name was removed. Continue taking this medication, and follow the directions you see here.     empagliflozin 10 mg tablet  Commonly known as: Jardiance  Take 1 tablet (10 mg total) by mouth once daily.  Start taking on: November 26, 2024  What changed:   medication strength  how much to take  how to take this  when to take this     isosorbide-hydrALAZINE 20-37.5 mg 20-37.5 mg Tab  Commonly known as: BIDIL  take 1 tablet orally twice a day  What changed: Another medication with the same name was removed. Continue taking this medication, and follow the directions you see here.            CONTINUE taking these medications      albuterol-ipratropium 2.5 mg-0.5 mg/3 mL nebulizer solution  Commonly known as: DUO-NEB  Take 3 mLs by nebulization every 6 (six) hours as needed for Wheezing or Shortness of Breath.     ALPRAZolam 2 MG Tab  Commonly known as: XANAX  Take 1 tablet (2 mg total) by mouth 2 (two) times daily as needed.     apixaban 5 mg Tab  Commonly known as: ELIQUIS  Take 1 tablet (5 mg total) by mouth 2 (two) times a day for 30 days.     aspirin 81 MG EC tablet  Commonly known as: ECOTRIN  Take 81 mg by mouth once daily.     atorvastatin 40 MG tablet  Commonly known as: LIPITOR  take one tablet by mouth in the evening     cetirizine 10 MG tablet  Commonly known as: ZyrTEC  Take 1 tablet (10 mg total) by mouth once daily.     DOBUTamine 1,000 mg/250 mL (4,000 mcg/mL) infusion  Commonly known as: DOBUTREX  Inject 409 mcg/min into the vein continuous.     ferrous sulfate 325 (65 FE) MG EC tablet  Take 325 mg by mouth once daily.     fluticasone propionate 50 mcg/actuation nasal spray  Commonly known as:  FLONASE ALLERGY RELIEF  Use 2 sprays (100 mcg total) by Each Nostril route once daily.     glimepiride 1 MG tablet  Commonly known as: AMARYL  Take 1 tablet (1 mg total) by mouth before breakfast.     HYDROcodone-acetaminophen  mg per tablet  Commonly known as: NORCO  Take 1 tablet by mouth every 6 (six) hours as needed for Pain.     hydrOXYzine HCL 25 MG tablet  Commonly known as: ATARAX  take one tablet orally four times a day as needed for itching     LIDOcaine 5 %  Commonly known as: LIDODERM  Place 1 patch onto the skin once daily. Remove & discard patch within 12 hours or as directed by MD.     metOLazone 5 MG tablet  Commonly known as: ZAROXOLYN  Take 1 tablet (5 mg total) by mouth daily as needed (Excess fluid).     neomycin-polymyxin-dexamethasone 3.5 mg/g-10,000 unit/g-0.1 % Oint  Commonly known as: DEXACINE  Place 1-2 drops into each eye three times daily for 7-10 days     nitroGLYCERIN 0.4 MG SL tablet  Commonly known as: NITROSTAT  Put 1 tab under the tongue every 5 minutes x 3 doses as needed for chest pain. Do not exceed 3 doses in 15 minutes. If no relief, go to ER.     ondansetron 8 MG Tbdl  Commonly known as: ZOFRAN-ODT  Dissolve 1 tablet (8 mg total) by mouth every 8 (eight) hours as needed (nausea).     pantoprazole 40 MG tablet  Commonly known as: PROTONIX  Take one tablet by mouth daily     sevelamer carbonate 800 mg Tab  Commonly known as: RENVELA  take FOUR tablets orally three times a day with meals     SPIRIVA WITH HANDIHALER 18 mcg inhalation capsule  Generic drug: tiotropium  Inhale 1 capsule (18 mcg total) into the lungs once daily.     TYLENOL ARTHRITIS PAIN 650 mg Tbsr  Generic drug: acetaminophen  Take 1 tablet (650 mg total) by mouth every 8 (eight) hours.     VENTOLIN HFA 90 mcg/actuation inhaler  Generic drug: albuterol  Inhale 2 puffs into the lungs every 6 (six) hours as needed for Shortness of Breath or Wheezing.     VITAMIN B-12 1000 MCG tablet  Generic drug:  cyanocobalamin  Take 1,000 mcg by mouth once daily.     VITAMIN D2 1,250 mcg (50,000 unit) capsule  Generic drug: ergocalciferol  Take 1 capsule orally once a  week            STOP taking these medications      metoprolol succinate 25 MG 24 hr tablet  Commonly known as: TOPROL-XL     torsemide 100 MG Tab  Commonly known as: DEMADEX              Indwelling Lines/Drains at time of discharge:   Lines/Drains/Airways       Peripherally Inserted Central Catheter Line  Duration             PICC Double Lumen 05/12/23 1534 right brachial 562 days                    Time spent on the discharge of patient: 35 minutes     Pt deemed appropriate for discharge. Plan discussed with pt, who was agreeable and amenable; medications were discussed and reviewed, outpatient follow-up scheduled, ER precautions were given, all questions were answered to the pt's satisfaction, and Ms Hawley was subsequently discharged.      Denny Elias DO  Department of Hospital Medicine  [facility]azquez - Cardiology Stepdown

## 2024-11-25 NOTE — PLAN OF CARE
Pt currently presents at her OF and is safe for discharge from acute care PT services.  11/25/2024    Problem: Physical Therapy  Goal: Physical Therapy Goal  Outcome: Met

## 2024-11-25 NOTE — CARE UPDATE
Cardiology Recs for prior to discharge:       1.Continue home dose of Bumex and PRN metolazone with follow up BMP in a week   2. Patient previously on jardiance 10mg but due to a GFR of 18.1 we recommend to discontinue jardiance and have patient reassess with HF clinic as an outpatient.   3. Continue home dose isosorbide dinitrate/hydralazine 20mg/37.5 (bidil)  on discharge and discontinue isosorbide mononitrate.   4. Recommend TCC follow up and HF clinic follow up.         Primary team has been updated. Please let us know if any further questions arise.       Lei Lopez MD  Cardiology fellow

## 2024-11-25 NOTE — PT/OT/SLP EVAL
"Physical Therapy Co-Evaluation and Discharge Note    Patient Name:  Hafsa Hawley   MRN:  [id number]  Admitting Diagnosis:  Acute on chronic combined systolic and diastolic heart failure   Recent Surgery: * No surgery found *    Admit Date: 11/18/2024  Length of Stay: 7 days    Recommendations:     Discharge Recommendations: No Therapy Indicated  Discharge Equipment Recommendations: wheelchair   Barriers to discharge: None    Appropriate transfer level with nursing staff: one person assist ambulation    Assessment:     Hafsa Hawley is a 59 y.o. female admitted with a medical diagnosis of Acute on chronic combined systolic and diastolic heart failure. Pt found sitting EOB with  at bedside and agreeable to therapy evaluation. Pt reports using rollator/rolling walker and straight cane for mobility prior to hospital admission due to some gait instability. She performed safe transfer to bedside commode using RW. She also ambulated ~120 ft using RW with no physical assistance. Pt denied request to practice stairs prior to discharge. All of pt's and 's questions about safe mobility answered. At this time, patient is functioning at their prior level of function and does not require further acute PT services.       GOALS: No goals identified at [facility].    Plan:     During this hospitalization, patient does not require further acute PT services.  Please re-consult if situation changes.      Subjective:     RN notified prior to session.  present upon PT entrance into room. Patient agreeable to PT evaluation.    Chief Complaint: chronic B foot pain  Patient/Family Comments/goals: "Do you think I could get a wheelchair to move better at the store or when I go places with my grandchildren?"  Pain/Comfort:  Pain Rating 1: 10/10  Location - Side 1: Bilateral  Location 1: foot (chronic feet pain (R>L))  Pain Rating Post-Intervention 1: 10/10    Social History:  Residence: Patient lives with their spouse in a " "mobile home with number of outside stair(s): 5 with R HR . Pt's bathroom has a tub/shower.  Equipment Owned (not using): rollator, walker, rolling, cane, straight, oxygen  Equipment Used: rollator and single point cane  Prior level of function:  Prior to admission, patient was modified independent using mainly cane for mobility around house and rollaltor or rolling walker for steps outside of house. She reports one fall about a month ago.   Assistance Upon Discharge: significant other    Objective:     Additional staff present:  OT and Supervising PT; OT for co-evaluation due to suspected patient need for two skilled therapists to appropriately assess patient's functional deficits as well as ensure patient safety, accommodate for limited activity tolerance, and provide appropriate, skilled assistance to maximize functional potential during evaluation.     Patient found sitting edge of bed with oxygen, peripheral IV, telemetry upon PT entry to room.    General Precautions: Standard, fall   Orthopedic Precautions:N/A   Braces: N/A   Body mass index is 28.68 kg/m².  Oxygen Device: Nasal Cannula 2L  Vitals: /70 (BP Location: Right arm, Patient Position: Sitting)   Pulse 92   Temp 98.5 °F (36.9 °C) (Oral)   Resp 18   Ht 5' 6" (1.676 m)   Wt 80.6 kg (177 lb 11.1 oz)   LMP 10/23/2001   SpO2 98%   BMI 28.68 kg/m²     Exam:  Cognition:   Oriented X 4 and Alert  Patient is oriented to Person, Place, Time, Situation  Command following: Follows multistep verbal commands  Fluency: clear/fluent  Skin Integrity: Visible skin intact  Edema: None noted  Sensation: Intact  Postural Assessment: slouched posture and forward head  Coordination: grossly intact  Range of Motion:  RUE: WNL  LUE: WNL  RLE: WNL  LLE: WNL  Strength Exam:  Bilateral Upper Extremity Strength: grossly WFL  Bilateral Lower Extremity Strength: grossly WFL    Outcome Measures:  AM-PAC 6 CLICK MOBILITY  Turning over in bed (including adjusting " bedclothes, sheets and blankets)?: 4  Sitting down on and standing up from a chair with arms (e.g., wheelchair, bedside commode, etc.): 4  Moving from lying on back to sitting on the side of the bed?: 4  Moving to and from a bed to a chair (including a wheelchair)?: 3  Need to walk in hospital room?: 3  Climbing 3-5 steps with a railing?: 3  Basic Mobility Total Score: 21     Functional Mobility:      Sitting Balance at Edge of Bed:  Static Sitting Balance: Good : able to maintain balance against moderate resistance  Dynamic Sitting Balance: Good- : able to sit unsupported and weight shift across midline minimally  Assistance Level Required: Supervision  Time: ~5 minutes  Postural deviations noted: slouched forward head  Comments: Pt with good sitting balance EOB and no LOB, even with resistive MMT testing and dynamic reaching to fix socks.    Transfers:   Sit <> Stand Transfer: stand by assistance with rolling walker. [id number] from EOB  Bed <> bedside commode Transfer Step Transfer technique: stand by assistance with rolling walker. commode on the L    Standing Balance:  Static Standing Balance: Good- : able to maintain standing balance against minimal resistance  Dynamic Standing Balance: Fair : stand independently unsupported, weight shift, and reach ipsilaterally. LOB noted when crossing midline.  Assistance Level Required: Stand-by Assistance  Patient used: rolling walker   Gait:   Patient ambulated: ~120 ft   Patient required: stand by assistance  Patient used:  rolling walker   Gait Pattern observed: reciprocal gait  Gait Deviation(s): narrow base of support and decreased von  Impairments due to: impaired balance and pain  Portable Supplemental O2 2L utilized  all lines remained intact throughout ambulation trial  Comments: Patient required cues for position in walker to increase independence and safety. Patient required cues ~ 25% of the time.    Education:  Time provided for education, counseling and  discussion of health disposition in regards to patient's current status  All questions answered within PT scope of practice and to patient's satisfaction  PT role in POC to address current functional deficits  Pt educated on proper body mechanics, safety techniques, and energy conservation with PT facilitation and cueing throughout session  Call nursing/pct to transfer to chair/use bathroom. Pt stated understanding.  Whiteboard updated with therapist name and pt's current mobility status documented above  Safe to perform step transfer to/from chair/bedside commode SBA and RW w/ nursing/PCT present  Pt to ambulate 2-3/day SBA and RW with nsg/  in order to maintain functional mobility    Patient left up in chair with all lines intact, call button in reach, RN notified, and  present.    History:     Past Medical History:   Diagnosis Date    Allergy     Anemia     Arthritis     Atrial fibrillation     OAC    BMI 32.0-32.9,adult 02/22/2023    Chronic respiratory failure with hypoxia, on home oxygen therapy     2L with activity, off at rest.  Per Pulm  no overt evidence of ILD or COPD on PFTs and CT to explain O2 needs.    CKD (chronic kidney disease), stage IV 05/08/2018    Congestive heart failure     s/p AICD placement,    Deep vein thrombosis     Depression     elevated bilirubin d/t Gilbert's syndrome     confirmed by [place] genetic testing, evaluated by hepatology    Encounter for blood transfusion     GERD (gastroesophageal reflux disease)     Hypertension     Pheochromocytoma, malignant     Right kidney mass     Sleep apnea     Thalassemia trait, alpha     Thyroid disease     Type 2 diabetes mellitus with hyperglycemia, without long-term current use of insulin 08/13/2020       Past Surgical History:   Procedure Laterality Date    ANGIOGRAM, ABDOMINAL AORTA Right 04/15/2024    APPENDECTOMY      BONE MARROW BIOPSY      CARDIAC DEFIBRILLATOR PLACEMENT Left 12/2016    CARDIAC ELECTROPHYSIOLOGY MAPPING AND  ABLATION      CARDIAC ELECTROPHYSIOLOGY MAPPING AND ABLATION      COLONOSCOPY N/A 2022    Procedure: COLONOSCOPY;  Surgeon: Arely Betancourt MD;  Location: [facility] ENDO (2ND FLR);  Service: Endoscopy;  Laterality: N/A;  heart transplant candidate/ EF 25% - 2nd floor/ defib - Biotronik - ERW  Eliquis - per Dr. Cortez with [facility], Pt ok to hold Eliquis x 2 days prior-see media tab-outside correspondence dated 21  - ERW  verbal instructions/portal instructions/email instructions - s    EYE SURGERY      due to running tears    FRACTURE SURGERY Left     hand 5th digit    HYSTERECTOMY      KNEE SURGERY Left 2016    hematoma    LIVER BIOPSY  10/24/2018    Minimal steatosis, predominantly macrovesicular, 1%, Minimal nonspecific portal inflammation, no fibrosis. No findings on biopsy to explain elevated bilirubin levels. Could be d/t Gilbert's =?- hemolysis    RIGHT HEART CATHETERIZATION Right 2021    Procedure: INSERTION, CATHETER, RIGHT HEART;  Surgeon: Irving Cardenas MD;  Location: [facility] CATH LAB;  Service: Cardiology;  Laterality: Right;    RIGHT HEART CATHETERIZATION Right 2022    Procedure: INSERTION, CATHETER, RIGHT HEART;  Surgeon: Burke Camilo MD;  Location: [facility] CATH LAB;  Service: Cardiology;  Laterality: Right;    RIGHT HEART CATHETERIZATION Right 2023    Procedure: INSERTION, CATHETER, RIGHT HEART;  Surgeon: Katie Liriano DO;  Location: [facility] CATH LAB;  Service: Cardiology;  Laterality: Right;    TRANSJUGULAR BIOPSY OF LIVER N/A 10/24/2018    Procedure: BIOPSY, LIVER, TRANSJUGULAR APPROACH;  Surgeon: [facility] Diagnostic Provider;  Location: [facility] OR 2ND FLR;  Service: Radiology;  Laterality: N/A;       Family History   Problem Relation Name Age of Onset    Cancer Mother          pancreatic CA early 50's    Heart disease Father           MI in late 50's    Hypertension Father      Heart attack Father      Heart disease Sister      Heart disease Brother      Cirrhosis  Brother          alcoholic    Heart disease Sister      Heart disease Brother      Hypertension Brother      Diabetes Brother         Social History     Socioeconomic History    Marital status:    Tobacco Use    Smoking status: Never    Smokeless tobacco: Never   Substance and Sexual Activity    Alcohol use: Not Currently     Comment: up to 1 yr ago drank 2-3 drinks on occasion but sporadic    Drug use: No    Sexual activity: Yes     Partners: Male   Social History Narrative    On disability since 2013     Social Drivers of Health     Financial Resource Strain: Low Risk  (11/22/2024)    Overall Financial Resource Strain (CARDIA)     Difficulty of Paying Living Expenses: Not hard at all   Food Insecurity: No Food Insecurity (11/22/2024)    Hunger Vital Sign     Worried About Running Out of Food in the Last Year: Never true     Ran Out of Food in the Last Year: Never true   Transportation Needs: No Transportation Needs (11/22/2024)    TRANSPORTATION NEEDS     Transportation : No   Physical Activity: Inactive (8/8/2024)    Exercise Vital Sign     Days of Exercise per Week: 0 days     Minutes of Exercise per Session: 0 min   Stress: Stress Concern Present (11/22/2024)    [employer] - Occupational Stress Questionnaire     Feeling of Stress : Rather much   Housing Stability: Low Risk  (11/22/2024)    Housing Stability Vital Sign     Unable to Pay for Housing in the Last Year: No     Homeless in the Last Year: No       Time Tracking:     PT Received On: 11/25/24  PT Start Time: 1317     PT Stop Time: 1341  PT Total Time (min): 24 min     Billable Minutes: Evaluation 6 min and Gait Training 18 min    11/25/2024

## 2024-11-25 NOTE — ASSESSMENT & PLAN NOTE
Acute on chronic systolic HF, NYHA class III, stage D. On home dobutamine 5 mcg/kg/min. Etiology: NICM.  Hemodynamic status: warm, normotensive, severely hypervolemic.Patient not in cardiogenic shock given above profile and negative lactate        Plan:  --Continue home dose of Bumex and PRN metolazone   --Continue palliative dobutmaine  --Continue Jardiance as it was continued outpatient by heart failure clinic but in the setting of low GFR there is need for reassessment outpatient whether jardiance need to be continued.  --Outpatient follow up with heart failure transitional care clinic and palliative.  --Discharge patient with home health  -- HF Pathway: Sodium restriction <2 g, Fluid restriction < 1500 mL, Daily weights     Cardiology will sign off.

## 2024-11-25 NOTE — PT/OT/SLP PROGRESS
Physical Therapy      Patient Name:  Hafsa Hawley   MRN:  [id number]    Patient would benefit from wheelchair/transport wheelchair at discharge for community distances based on diagnosis for safe mobility.    Patient has a mobility limitation that significantly impairs their ability to participate in one or more mobility related activities of daily living in customary locations in the home. The mobility limitation cannot be sufficiently resolved by the use of a cane or walker. The use of a manual wheelchair will greatly improve the patient's ability to participate in MRADLs. The patient will use the wheelchair on a regular basis at home. They have expressed their willingness to use a manual wheelchair in the home, and have a caregiver who is available and willing to assist with the wheelchair if needed.     11/25/2024

## 2024-11-25 NOTE — PLAN OF CARE
[facility] - Cardiology Stepdown  Discharge Final Note    Primary Care Provider: Cristobal Ann MD    Expected Discharge Date: 11/25/2024    Final Discharge Note (most recent)       Final Note - 11/25/24 1515          Final Note    Assessment Type Final Discharge Note     Anticipated Discharge Disposition Home or Self Care     Hospital Resources/Appts/Education Provided Appointments scheduled and added to AVS        Post-Acute Status    Post-Acute Authorization IV Infusion;HME     HME Status Set-up Complete/Auth obtained     IV Infusion Status Set-up Complete/Auth obtained                 Per Peri with [facility] bags have been delivered and pt hooked herself up.  Order placed for wheelchair and per LAURIE Ruff pt would like wheelchair to be delivered to the home.  SW met with pt and  at bedside and explained that wheelchair order was placed but [facility] will need to review to see if pt will be approved.  SW also discussed that PCP appt was scheduled and they can discuss with PCP any issues related to setting up outpatient therapy.  Pt and  voiced understanding.    Per Zo with [facility] pt is approved for wheelchair and they will reach out to pt regarding delivery.      Lourdes Elder LMSW  [facility]  [id number]        Future Appointments   Date Time Provider Department Center   12/2/2024  9:00 AM Cristobal Reza MD [facility]   12/2/2024 12:30 PM CHAIR 02 DELFIN LENZ CHEMO [facility]   12/9/2024 11:00 AM ACUTE DERMATOLOGY CLINIC [facility] DERM [facility]   2/10/2025  3:00 PM Sandra Hernandes MD [facility] PAL MED [facility]   3/3/2025  1:00 PM Vijay Basurto MD [facility] [place] [facility]

## 2024-11-29 DIAGNOSIS — G62.9 NEUROPATHY: ICD-10-CM

## 2024-11-29 DIAGNOSIS — R52 PAIN: ICD-10-CM

## 2024-11-29 RX ORDER — HYDROCODONE BITARTRATE AND ACETAMINOPHEN 10; 325 MG/1; MG/1
1 TABLET ORAL EVERY 6 HOURS PRN
Qty: 120 TABLET | Refills: 0 | Status: SHIPPED | OUTPATIENT
Start: 2024-11-29

## 2024-11-29 RX ORDER — FERROUS SULFATE 325(65) MG
325 TABLET ORAL
Qty: 60 TABLET | Refills: 11 | Status: CANCELLED | OUTPATIENT
Start: 2024-11-29

## 2024-11-29 RX ORDER — PREGABALIN 50 MG/1
50 CAPSULE ORAL NIGHTLY
Qty: 30 CAPSULE | Refills: 2 | Status: CANCELLED | OUTPATIENT
Start: 2024-11-29 | End: 2025-02-27

## 2024-12-02 ENCOUNTER — INFUSION (OUTPATIENT)
Dept: INFUSION THERAPY | Facility: HOSPITAL | Age: 59
End: 2024-12-02
Attending: INTERNAL MEDICINE
Payer: MEDICAID

## 2024-12-02 VITALS — RESPIRATION RATE: 20 BRPM | HEART RATE: 87 BPM | SYSTOLIC BLOOD PRESSURE: 117 MMHG | DIASTOLIC BLOOD PRESSURE: 79 MMHG

## 2024-12-04 PROBLEM — Z63.4 BEREAVEMENT: Status: ACTIVE | Noted: 2024-12-04

## 2024-12-04 PROBLEM — F43.23 ADJUSTMENT DISORDER WITH MIXED ANXIETY AND DEPRESSED MOOD: Status: ACTIVE | Noted: 2024-12-04

## 2024-12-06 ENCOUNTER — TELEPHONE (OUTPATIENT)
Dept: DERMATOLOGY | Facility: CLINIC | Age: 59
End: 2024-12-06
Payer: MEDICAID

## 2024-12-09 ENCOUNTER — TELEPHONE (OUTPATIENT)
Dept: DERMATOLOGY | Facility: CLINIC | Age: 59
End: 2024-12-09
Payer: MEDICAID

## 2024-12-09 ENCOUNTER — HOSPITAL ENCOUNTER (EMERGENCY)
Facility: HOSPITAL | Age: 59
Discharge: ANOTHER HOSPITAL | End: 2024-12-09
Attending: FAMILY MEDICINE
Payer: MEDICAID

## 2024-12-09 ENCOUNTER — INFUSION (OUTPATIENT)
Dept: INFUSION THERAPY | Facility: HOSPITAL | Age: 59
End: 2024-12-09
Attending: STUDENT IN AN ORGANIZED HEALTH CARE EDUCATION/TRAINING PROGRAM
Payer: MEDICAID

## 2024-12-09 VITALS
TEMPERATURE: 98 F | HEART RATE: 90 BPM | RESPIRATION RATE: 18 BRPM | SYSTOLIC BLOOD PRESSURE: 118 MMHG | DIASTOLIC BLOOD PRESSURE: 82 MMHG

## 2024-12-09 VITALS
HEIGHT: 66 IN | SYSTOLIC BLOOD PRESSURE: 137 MMHG | OXYGEN SATURATION: 99 % | BODY MASS INDEX: 28.45 KG/M2 | WEIGHT: 177 LBS | DIASTOLIC BLOOD PRESSURE: 89 MMHG | TEMPERATURE: 98 F | HEART RATE: 94 BPM | RESPIRATION RATE: 22 BRPM

## 2024-12-09 DIAGNOSIS — I50.9 CONGESTIVE HEART FAILURE, UNSPECIFIED HF CHRONICITY, UNSPECIFIED HEART FAILURE TYPE: Primary | ICD-10-CM

## 2024-12-09 DIAGNOSIS — R07.9 CHEST PAIN: ICD-10-CM

## 2024-12-09 DIAGNOSIS — K59.00 CONSTIPATION: ICD-10-CM

## 2024-12-09 DIAGNOSIS — R05.9 COUGH: ICD-10-CM

## 2024-12-09 DIAGNOSIS — R06.02 SHORTNESS OF BREATH: ICD-10-CM

## 2024-12-09 LAB
ALBUMIN SERPL BCP-MCNC: 3.7 G/DL (ref 3.5–5.2)
ALP SERPL-CCNC: 74 U/L (ref 40–150)
ALT SERPL W/O P-5'-P-CCNC: 13 U/L (ref 10–44)
ANION GAP SERPL CALC-SCNC: 16 MMOL/L (ref 8–16)
ANISOCYTOSIS BLD QL SMEAR: ABNORMAL
AST SERPL-CCNC: 15 U/L (ref 10–40)
BACTERIA #/AREA URNS HPF: ABNORMAL /HPF
BASOPHILS # BLD AUTO: 0.02 K/UL (ref 0–0.2)
BASOPHILS NFR BLD: 0.5 % (ref 0–1.9)
BILIRUB SERPL-MCNC: 2.8 MG/DL (ref 0.1–1)
BILIRUB UR QL STRIP: ABNORMAL
BNP SERPL-MCNC: 4493 PG/ML (ref 0–99)
BUN SERPL-MCNC: 114 MG/DL (ref 6–20)
CALCIUM SERPL-MCNC: 9.5 MG/DL (ref 8.7–10.5)
CHLORIDE SERPL-SCNC: 98 MMOL/L (ref 95–110)
CK SERPL-CCNC: 32 U/L (ref 20–180)
CLARITY UR: CLEAR
CO2 SERPL-SCNC: 23 MMOL/L (ref 23–29)
COLOR UR: YELLOW
CREAT SERPL-MCNC: 4 MG/DL (ref 0.5–1.4)
DIFFERENTIAL METHOD BLD: ABNORMAL
EOSINOPHIL # BLD AUTO: 0 K/UL (ref 0–0.5)
EOSINOPHIL NFR BLD: 1 % (ref 0–8)
ERYTHROCYTE [DISTWIDTH] IN BLOOD BY AUTOMATED COUNT: 31.6 % (ref 11.5–14.5)
EST. GFR  (NO RACE VARIABLE): 12 ML/MIN/1.73 M^2
GLUCOSE SERPL-MCNC: 93 MG/DL (ref 70–110)
GLUCOSE UR QL STRIP: NEGATIVE
HCT VFR BLD AUTO: 35.9 % (ref 37–48.5)
HGB BLD-MCNC: 10.5 G/DL (ref 12–16)
HGB UR QL STRIP: ABNORMAL
HOWELL-JOLLY BOD BLD QL SMEAR: ABNORMAL
HYALINE CASTS #/AREA URNS LPF: 3 /LPF
IMM GRANULOCYTES # BLD AUTO: 0.02 K/UL (ref 0–0.04)
IMM GRANULOCYTES NFR BLD AUTO: 0.5 % (ref 0–0.5)
INFLUENZA A, MOLECULAR: NEGATIVE
INFLUENZA B, MOLECULAR: NEGATIVE
KETONES UR QL STRIP: NEGATIVE
LEUKOCYTE ESTERASE UR QL STRIP: NEGATIVE
LYMPHOCYTES # BLD AUTO: 0.7 K/UL (ref 1–4.8)
LYMPHOCYTES NFR BLD: 16 % (ref 18–48)
MCH RBC QN AUTO: 18.9 PG (ref 27–31)
MCHC RBC AUTO-ENTMCNC: 29.2 G/DL (ref 32–36)
MCV RBC AUTO: 65 FL (ref 82–98)
MICROSCOPIC COMMENT: ABNORMAL
MONOCYTES # BLD AUTO: 0.5 K/UL (ref 0.3–1)
MONOCYTES NFR BLD: 11.1 % (ref 4–15)
NEUTROPHILS # BLD AUTO: 2.9 K/UL (ref 1.8–7.7)
NEUTROPHILS NFR BLD: 70.9 % (ref 38–73)
NITRITE UR QL STRIP: NEGATIVE
NRBC BLD-RTO: 11 /100 WBC
OVALOCYTES BLD QL SMEAR: ABNORMAL
PH UR STRIP: 5 [PH] (ref 5–8)
PLATELET # BLD AUTO: 175 K/UL (ref 150–450)
PMV BLD AUTO: ABNORMAL FL (ref 9.2–12.9)
POIKILOCYTOSIS BLD QL SMEAR: ABNORMAL
POLYCHROMASIA BLD QL SMEAR: ABNORMAL
POTASSIUM SERPL-SCNC: 4.3 MMOL/L (ref 3.5–5.1)
PROT SERPL-MCNC: 6.5 G/DL (ref 6–8.4)
PROT UR QL STRIP: ABNORMAL
RBC # BLD AUTO: 5.57 M/UL (ref 4–5.4)
RBC #/AREA URNS HPF: 1 /HPF (ref 0–4)
SARS-COV-2 RDRP RESP QL NAA+PROBE: NEGATIVE
SCHISTOCYTES BLD QL SMEAR: ABNORMAL
SCHISTOCYTES BLD QL SMEAR: PRESENT
SODIUM SERPL-SCNC: 137 MMOL/L (ref 136–145)
SP GR UR STRIP: 1.02 (ref 1–1.03)
SPECIMEN SOURCE: NORMAL
SPHEROCYTES BLD QL SMEAR: ABNORMAL
TARGETS BLD QL SMEAR: ABNORMAL
TROPONIN I SERPL DL<=0.01 NG/ML-MCNC: 1.09 NG/ML (ref 0–0.03)
URN SPEC COLLECT METH UR: ABNORMAL
UROBILINOGEN UR STRIP-ACNC: 1 EU/DL
WBC # BLD AUTO: 4.13 K/UL (ref 3.9–12.7)
WBC #/AREA URNS HPF: 2 /HPF (ref 0–5)

## 2024-12-09 PROCEDURE — 25000003 PHARM REV CODE 250: Performed by: NURSE PRACTITIONER

## 2024-12-09 PROCEDURE — 93010 ELECTROCARDIOGRAM REPORT: CPT | Mod: ,,, | Performed by: INTERNAL MEDICINE

## 2024-12-09 PROCEDURE — 63600175 PHARM REV CODE 636 W HCPCS: Performed by: NURSE PRACTITIONER

## 2024-12-09 PROCEDURE — 94760 N-INVAS EAR/PLS OXIMETRY 1: CPT

## 2024-12-09 PROCEDURE — 84484 ASSAY OF TROPONIN QUANT: CPT | Performed by: NURSE PRACTITIONER

## 2024-12-09 PROCEDURE — 80053 COMPREHEN METABOLIC PANEL: CPT | Performed by: NURSE PRACTITIONER

## 2024-12-09 PROCEDURE — 83880 ASSAY OF NATRIURETIC PEPTIDE: CPT | Performed by: NURSE PRACTITIONER

## 2024-12-09 PROCEDURE — 27000221 HC OXYGEN, UP TO 24 HOURS

## 2024-12-09 PROCEDURE — 82550 ASSAY OF CK (CPK): CPT | Performed by: NURSE PRACTITIONER

## 2024-12-09 PROCEDURE — 87502 INFLUENZA DNA AMP PROBE: CPT | Performed by: NURSE PRACTITIONER

## 2024-12-09 PROCEDURE — 93005 ELECTROCARDIOGRAM TRACING: CPT

## 2024-12-09 PROCEDURE — 99900035 HC TECH TIME PER 15 MIN (STAT)

## 2024-12-09 PROCEDURE — 99285 EMERGENCY DEPT VISIT HI MDM: CPT | Mod: 25

## 2024-12-09 PROCEDURE — 87635 SARS-COV-2 COVID-19 AMP PRB: CPT | Performed by: NURSE PRACTITIONER

## 2024-12-09 PROCEDURE — 81000 URINALYSIS NONAUTO W/SCOPE: CPT | Performed by: NURSE PRACTITIONER

## 2024-12-09 PROCEDURE — 85025 COMPLETE CBC W/AUTO DIFF WBC: CPT | Performed by: NURSE PRACTITIONER

## 2024-12-09 RX ORDER — ONDANSETRON HYDROCHLORIDE 2 MG/ML
4 INJECTION, SOLUTION INTRAVENOUS
Status: COMPLETED | OUTPATIENT
Start: 2024-12-09 | End: 2024-12-09

## 2024-12-09 RX ORDER — FUROSEMIDE 10 MG/ML
40 INJECTION INTRAMUSCULAR; INTRAVENOUS
Status: DISCONTINUED | OUTPATIENT
Start: 2024-12-09 | End: 2024-12-09

## 2024-12-09 RX ORDER — DICYCLOMINE HYDROCHLORIDE 10 MG/1
20 CAPSULE ORAL
Status: COMPLETED | OUTPATIENT
Start: 2024-12-09 | End: 2024-12-09

## 2024-12-09 RX ADMIN — ONDANSETRON 4 MG: 2 INJECTION INTRAMUSCULAR; INTRAVENOUS at 03:12

## 2024-12-09 RX ADMIN — ONDANSETRON 4 MG: 2 INJECTION INTRAMUSCULAR; INTRAVENOUS at 08:12

## 2024-12-09 RX ADMIN — DICYCLOMINE HYDROCHLORIDE 20 MG: 10 CAPSULE ORAL at 08:12

## 2024-12-09 NOTE — ED TRIAGE NOTES
C/o lower abdominal pain that radiates to lower back, epigastric pain that radiates below left breast and in to left upper back, and nausea since yesterday.

## 2024-12-09 NOTE — ED PROVIDER NOTES
"Encounter Date: 12/9/2024       History     Chief Complaint   Patient presents with    Chest Pain    Abdominal Pain    Nausea    Back Pain     Chief complaint: Fatigue, shortness of breath   59-year-old female with a previous medical history of  CHF due to nonischemic cardiomyopathy with ejection fraction of 10-15% and home Dobutrex drip, pulmonary hypertension, diabetes, hypertension, atrial fibrillation and ICD,presents to be evaluated for exacerbation of her usual shortness of breath and fatigue that began several days ago.  Patient states that she feels so fatigued she is unable to perform her ADLs.  She reports worsening LAGUERRE.  Reports chest pressure but denies chest pain or palpitations at this time.  Denies cough or congestion.  Also reports some nausea as well as some epigastric abdominal pain.  She reports a radiates to her sides and upper back.  She is not taking any medications at home for symptom relief.  Patient is very anxious and tearful in ED today      Review of patient's allergies indicates:   Allergen Reactions    Penicillins Hives and Other (See Comments)    Iodinated contrast media Nausea And Vomiting    Oxycodone-acetaminophen Other (See Comments)     Nausea, Dizziness, Anxiety.  "I don't like how it makes me feel."   Given Hydromorphone 0.5mg IVP  Without problems.  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 Metabolism of other drugs not listed here " may also be impacted by UGT1A1 enzyme activity.       Tramadol Nausea And Vomiting and Other (See Comments)     Other reaction(s): Other (See Comments)    Valsartan Other (See Comments)     Past Medical History:   Diagnosis Date    Allergy     Anemia     Arthritis     Atrial fibrillation     OAC    BMI 32.0-32.9,adult 02/22/2023    Chronic respiratory failure with hypoxia, on home oxygen therapy     2L with activity, off at rest.  Per Pulm  no overt evidence of ILD or COPD on PFTs and CT to explain O2 needs.    CKD (chronic kidney disease), stage IV 05/08/2018    Congestive heart failure     s/p AICD placement,    Deep vein thrombosis     Depression     elevated bilirubin d/t Gilbert's syndrome     confirmed by [facility] [place] genetic testing, evaluated by hepatology    Encounter for blood transfusion     GERD (gastroesophageal reflux disease)     Hypertension     Pheochromocytoma, malignant     Right kidney mass     Sleep apnea     Thalassemia trait, alpha     Thyroid disease     Type 2 diabetes mellitus with hyperglycemia, without long-term current use of insulin 08/13/2020     Past Surgical History:   Procedure Laterality Date    ANGIOGRAM, ABDOMINAL AORTA Right 04/15/2024    APPENDECTOMY      BONE MARROW BIOPSY      CARDIAC DEFIBRILLATOR PLACEMENT Left 12/2016    CARDIAC ELECTROPHYSIOLOGY MAPPING AND ABLATION      CARDIAC ELECTROPHYSIOLOGY MAPPING AND ABLATION      COLONOSCOPY N/A 05/06/2022    Procedure: COLONOSCOPY;  Surgeon: Arely Betancourt MD;  Location: [facility] ([address] [place] [address]);  Service: Endoscopy;  Laterality: N/A;  heart transplant candidate/ EF 25% - 2nd floor/ defib - Biotronik - ERW  Eliquis - per Dr. Cortez with [facility], Pt ok to hold Eliquis x 2 days prior-see media tab-outside correspondence dated 12/30/21  - [facility]  verbal instructions/portal instructions/email instructions - s    EYE SURGERY      due to running tears    FRACTURE SURGERY Left     hand 5th digit    HYSTERECTOMY      KNEE SURGERY Left 2016     hematoma    LIVER BIOPSY  10/24/2018    Minimal steatosis, predominantly macrovesicular, 1%, Minimal nonspecific portal inflammation, no fibrosis. No findings on biopsy to explain elevated bilirubin levels. Could be d/t Gilbert's =?- hemolysis    RIGHT HEART CATHETERIZATION Right 2021    Procedure: INSERTION, CATHETER, RIGHT HEART;  Surgeon: Irving Cardenas MD;  Location: [facility] CATH LAB;  Service: Cardiology;  Laterality: Right;    RIGHT HEART CATHETERIZATION Right 2022    Procedure: INSERTION, CATHETER, RIGHT HEART;  Surgeon: Burke Camilo MD;  Location: [facility] CATH LAB;  Service: Cardiology;  Laterality: Right;    RIGHT HEART CATHETERIZATION Right 2023    Procedure: INSERTION, CATHETER, RIGHT HEART;  Surgeon: Katie Liriano DO;  Location: [facility] CATH LAB;  Service: Cardiology;  Laterality: Right;    TRANSJUGULAR BIOPSY OF LIVER N/A 10/24/2018    Procedure: BIOPSY, LIVER, TRANSJUGULAR APPROACH;  Surgeon: Carmen Diagnostic Provider;  Location: [facility] OR [address] [place] [address];  Service: Radiology;  Laterality: N/A;     Family History   Problem Relation Name Age of Onset    Cancer Mother          pancreatic CA early 50's    Heart disease Father           MI in late 50's    Hypertension Father      Heart attack Father      Heart disease Sister      Heart disease Brother      Cirrhosis Brother          alcoholic    Heart disease Sister      Heart disease Brother      Hypertension Brother      Diabetes Brother       Social History     Tobacco Use    Smoking status: Never    Smokeless tobacco: Never   Substance Use Topics    Alcohol use: Not Currently     Comment: up to 1 yr ago drank 2-3 drinks on occasion but sporadic    Drug use: No     Review of Systems   Constitutional:  Positive for activity change, appetite change and fatigue. Negative for fever.   Respiratory:  Positive for cough, chest tightness and shortness of breath. Negative for wheezing.    Cardiovascular:  Negative for chest pain and  palpitations.   Gastrointestinal:  Positive for nausea. Negative for abdominal pain, constipation, diarrhea and vomiting.   Musculoskeletal:  Positive for myalgias.   Neurological:  Positive for weakness.       Physical Exam     Initial Vitals [12/09/24 1324]   BP Pulse Resp Temp SpO2   112/74 90 20 98.1 °F (36.7 °C) 98 %      MAP       --         Physical Exam    Nursing note and vitals reviewed.  Constitutional: She appears well-developed and well-nourished.   Eyes: EOM are normal. Pupils are equal, round, and reactive to light.   Cardiovascular:  Normal rate and regular rhythm.     Exam reveals no gallop and no friction rub.       No murmur heard.  Pulmonary/Chest: She has no wheezes. She has no rhonchi. She has no rales.   Diminished throughout   Abdominal: Abdomen is soft. Bowel sounds are normal. She exhibits no distension and no mass. There is abdominal tenderness.   Mild epigastric tenderness There is no rebound and no guarding.   Musculoskeletal:         General: No edema.     Neurological: She is alert and oriented to person, place, and time. She has normal strength.   Skin: No rash noted.   Psychiatric: She has a normal mood and affect. Thought content normal.         ED Course   Procedures  Labs Reviewed   CBC W/ AUTO DIFFERENTIAL - Abnormal       Result Value    WBC 4.13      RBC 5.57 (*)     Hemoglobin 10.5 (*)     Hematocrit 35.9 (*)     MCV 65 (*)     MCH 18.9 (*)     MCHC 29.2 (*)     RDW 31.6 (*)     Platelets 175      MPV SEE COMMENT      Immature Granulocytes 0.5      Gran # (ANC) 2.9      Immature Grans (Abs) 0.02      Lymph # 0.7 (*)     Mono # 0.5      Eos # 0.0      Baso # 0.02      nRBC 11 (*)     Gran % 70.9      Lymph % 16.0 (*)     Mono % 11.1      Eosinophil % 1.0      Basophil % 0.5      Aniso Marked      Poik Moderate      Poly Marked      Ovalocytes Occasional      Target Cells Occasional      Spherocytes Moderate      Schistocytes Present      Phillips-Jolly Bodies Moderate       Fragmented Cells Occasional      Differential Method Automated     COMPREHENSIVE METABOLIC PANEL - Abnormal    Sodium 137      Potassium 4.3      Chloride 98      CO2 23      Glucose 93       (*)     Creatinine 4.0 (*)     Calcium 9.5      Total Protein 6.5      Albumin 3.7      Total Bilirubin 2.8 (*)     Alkaline Phosphatase 74      AST 15      ALT 13      eGFR 12 (*)     Anion Gap 16      Narrative:     Recoll. [id number] by IEE at 12/09/2024 14:31, reason: Specimen   hemolyzed   B-TYPE NATRIURETIC PEPTIDE - Abnormal    BNP 4,493 (*)     Narrative:     Recoll. [id number] by IEE at 12/09/2024 14:31, reason: Specimen   hemolyzed   TROPONIN I - Abnormal    Troponin I 1.092 (*)     Narrative:     Recoll. [id number] by IEE at 12/09/2024 14:31, reason: Specimen   hemolyzed   URINALYSIS, REFLEX TO URINE CULTURE - Abnormal    Specimen UA Urine, Catheterized      Color, UA Yellow      Appearance, UA Clear      pH, UA 5.0      Specific Gravity, UA 1.025      Protein, UA 3+ (*)     Glucose, UA Negative      Ketones, UA Negative      Bilirubin (UA) 1+ (*)     Occult Blood UA 2+ (*)     Nitrite, UA Negative      Urobilinogen, UA 1.0      Leukocytes, UA Negative      Narrative:     Specimen Source->Urine   URINALYSIS MICROSCOPIC - Abnormal    RBC, UA 1      WBC, UA 2      Bacteria Occasional      Hyaline Casts, UA 3 (*)     Microscopic Comment SEE COMMENT      Narrative:     Specimen Source->Urine   INFLUENZA A & B BY MOLECULAR    Influenza A, Molecular Negative      Influenza B, Molecular Negative      Flu A & B Source NP     CK    CPK 32      Narrative:     Recoll. [id number] by IEE at 12/09/2024 14:31, reason: Specimen   hemolyzed   SARS-COV-2 RNA AMPLIFICATION, QUAL    SARS-CoV-2 RNA, Amplification, Qual Negative            Imaging Results              X-Ray Abdomen Flat And Erect (Final result)  Result time 12/09/24 14:33:54      Final result by Debora Bone MD (12/09/24 14:33:54)                    Impression:      Nonobstructive bowel gas pattern.      Electronically signed by: Debora Bone MD  Date:    12/09/2024  Time:    14:33               Narrative:    EXAMINATION:  XR ABDOMEN FLAT AND ERECT    CLINICAL HISTORY:  Constipation, unspecified    TECHNIQUE:  Flat and erect AP views of the abdomen were preformed.    COMPARISON:  06/28/2024    FINDINGS:  The bowel gas pattern is nonobstructive.  No free air.  No abnormal masses or calcifications.  Skeletal structures are intact.                                       X-Ray Chest AP Portable (Final result)  Result time 12/09/24 14:31:27      Final result by Debora Bone MD (12/09/24 14:31:27)                   Impression:      As above.      Electronically signed by: Debora Bone MD  Date:    12/09/2024  Time:    14:31               Narrative:    EXAMINATION:  XR CHEST AP PORTABLE    CLINICAL HISTORY:  Cough, unspecified    TECHNIQUE:  Single frontal view of the chest was performed.    COMPARISON:  11/18/2024    FINDINGS:  The heart is significantly enlarged.  Left-sided pacer device remains in place.  Right-sided PICC catheter has its tip in the region of the distal SVC.  There is suspected left retrocardiac opacity which could reflect atelectasis, aspiration or pneumonia.  The right lung appears grossly clear.  Skeletal structures are intact.                                       Medications   ondansetron injection 4 mg (4 mg Intravenous Given 12/9/24 1550)     Medical Decision Making  59 year female presents to be evaluated for worsening of shortness breaths fatigue nausea vomiting and abdominal pain that began several days ago   Differential diagnoses include CHF exacerbation, pneumonia, fluid volume overload, renal failure, ACS, STEMI, NSTEMI    Amount and/or Complexity of Data Reviewed  Labs: ordered. Decision-making details documented in ED Course.  Radiology: ordered.    Risk  Prescription drug management.  Risk Details: Patient with diminished  breath sounds throughout   No murmurs rubs or gallops on auscultation  Mild epigastric tenderness with active bowel sounds   Patient had leukocytosis   Significant elevation in BNP as well as troponin greater than 1  Worsening renal function  Patient states that she feels unable to return home   Given exacerbation of symptoms feel patient will benefit from it admission with cardio  evaluation   Spoke with hospital medicine who does not feel comfortable admitting patient  Denied  [facility] due to capacity  Will transfer to [facility]               ED Course as of 12/09/24 1750   Mon Dec 09, 2024   1534 EKG  Sinus rhythm with first-degree AV block with PACs  Heart rate 95    No acute ST elevations, ST changes throughout  Abnormal  Reviewed and interpreted [FP]   1637 BILIRUBIN TOTAL(!): 2.8 [CB]   1642 Creatinine(!): 4.0  Her baseline he is around 2 [CB]   1643 BNP(!): 4,493 [CB]      ED Course User Index  [CB] Salud Pagan NP  [FP] Shanthi Calhoun MD                           Clinical Impression:  Final diagnoses:  [R07.9] Chest pain  [R05.9] Cough  [R06.02] Shortness of breath  [K59.00] Constipation  [I50.9] Congestive heart failure, unspecified HF chronicity, unspecified heart failure type (Primary)          ED Disposition Condition    Transfer to Another Facility Stable                Salud Pagan NP  12/09/24 1750

## 2024-12-09 NOTE — PROGRESS NOTES
Right upper arm PICC line dressing change done using sterile technique. No signs of infection noted Stat lock device, bio patch and tegaderm dressing reapplied. Tolerated well.

## 2024-12-09 NOTE — ED NOTES
Lab at bedside for lab redraw due to possible hemolysis/requested redraw from lab prior to resulting labs.

## 2024-12-10 PROBLEM — E11.9 TYPE 2 DIABETES MELLITUS, WITHOUT LONG-TERM CURRENT USE OF INSULIN: Status: ACTIVE | Noted: 2024-12-10

## 2024-12-10 PROBLEM — N18.5 CKD (CHRONIC KIDNEY DISEASE) STAGE 5, GFR LESS THAN 15 ML/MIN: Status: ACTIVE | Noted: 2024-12-10

## 2024-12-10 PROBLEM — N89.8 VAGINAL PRURITUS: Status: ACTIVE | Noted: 2024-12-10

## 2024-12-10 PROBLEM — N89.8 VAGINAL PRURITUS: Status: RESOLVED | Noted: 2024-12-10 | Resolved: 2024-12-10

## 2024-12-10 LAB
OHS QRS DURATION: 116 MS
OHS QTC CALCULATION: 502 MS

## 2024-12-10 NOTE — ED NOTES
Verbal report given to LAURIE Castaneda at [facility]. Awaiting [facility] arrival to department.

## 2024-12-12 PROBLEM — E11.9 TYPE 2 DIABETES MELLITUS, WITHOUT LONG-TERM CURRENT USE OF INSULIN: Chronic | Status: ACTIVE | Noted: 2024-12-10

## 2024-12-16 ENCOUNTER — INFUSION (OUTPATIENT)
Dept: INFUSION THERAPY | Facility: HOSPITAL | Age: 59
End: 2024-12-16
Attending: STUDENT IN AN ORGANIZED HEALTH CARE EDUCATION/TRAINING PROGRAM
Payer: MEDICAID

## 2024-12-16 VITALS — HEART RATE: 89 BPM | DIASTOLIC BLOOD PRESSURE: 83 MMHG | RESPIRATION RATE: 18 BRPM | SYSTOLIC BLOOD PRESSURE: 117 MMHG

## 2024-12-16 NOTE — PATIENT INSTRUCTIONS
Encouraged to f/u for appointments.   [facility]    Chief Complaint   Patient presents with     RECHECK       HPI:    Adriane Flores is a 78 year old  (1940), who is being seen today for an episodic care visit at [facility].  HPI information obtained from: facility chart records, facility staff, patient report and [facility] chart review.Today's concern is:  Closed fracture of neck of right femur with routine healing  History of right hip hemiarthroplasty  Likely occurred after a fall sustained 3d prior to presentation. No LOC with fall. Ortho consulted and she underwent a R bipolar hip hemiarthroplasty per Dr. Ellington on 3/20/18. Postop course was uneventful. Avoided narcotics given predisposition to delirium with her dementia. Her pain was managed with sched Tylenol (325mg TID), though she would intermittently refuse doses. Seen by therapy services postop, difficult time with participation given her advanced dementia. Recommended discharge to TCU. Discharge on Xarelto for DVT ppx. Will need to be monitored closely at TCU while on anticoagulation given her fall risk.  Patient denies pain today  PT/OT following  Tylenol for pain as noted above - has not requested/noted need for stronger pain control regimen since admission  Xarelto for anticoagulation  Staff is attaining Xrays/photos of incision today to communicate with Ortho for patient's f/u to prevent her having to be taken from the facility.    Alzheimer's dementia without behavioral disturbance, unspecified timing of dementia onset  Prior to admission she had been residing with her  at home. Has a caregiver 6d/wk (exept for Sundays from 8-5).   Kept on PTA meds this stay, including Namenda, Aricept, Wellbutrin and prn Ativan, though some formulations were changed from long acting to short acting as she was requiring her meds to be crushed and given with applesauce.   PRN Seroquel ordered to help manage agitation/insomnia -- had minimal need  during postop period  Discharged to memory care TCU for ongoing care.  Chronic; progressive; continues to require 24-hr supportive cares  Patient very confused at baseline - responds with simple short answers  Does live at home with her  prior to fall  On regimen of Namenda, Aricept, Seroquel and Ativan  Has not had behaviors since admission - has not required use of Ativan or Seroquel    Hyperlipidemia LDL goal <100  Chronic  No active tx regimen  Recent Labs   Lab Test  03/10/17   1033  02/23/16   0917  02/18/15   0837  02/03/14   0954   CHOL  214*  226*  219*  202*   HDL  53  70  68  55   LDL  128*  139*  136*  129   TRIG  166*  84  74  88   CHOLHDLRATIO   --    --   3.2  3.7     Adjustment disorder with mixed anxiety and depressed mood  No active tx regimen   Patient does not display any active s/sx of current depression - very happy in demeanor and pleasant with conversation, smiling frequently    Physical deconditioning  2/2 hospitalization and above noted diagnoses  PT/OT following    ALLERGIES: Sulfa drugs  Past Medical, Surgical, Family and Social History reviewed and updated in [employer].    Current Outpatient Prescriptions   Medication Sig Dispense Refill     acetaminophen (TYLENOL) 500 MG tablet Take 1,000 mg by mouth 3 times daily       memantine XR (NAMENDA XR) 28 MG 24 hr capsule Take 1 capsule (28 mg) by mouth daily       QUEtiapine (SEROQUEL) 25 MG tablet Take 0.5 tablets (12.5 mg) by mouth 2 times daily as needed (give for severe agitation/at danger to self/others during the day, or for persistent insomina, mild agitation at night.) 60 tablet      LORazepam (ATIVAN) 0.5 MG tablet Take 1 tablet (0.5 mg) by mouth every 8 hours as needed for anxiety 30 tablet 0     rivaroxaban ANTICOAGULANT (XARELTO) 10 MG TABS tablet Take 1 tablet (10 mg) by mouth daily (with dinner) 21 tablet 0     donepezil (ARICEPT) 5 MG tablet Take 1 tablet (5 mg) by mouth At Bedtime 90 tablet 3     Medications  "reviewed:  Medications reconciled to facility chart and changes were made to reflect current medications as identified as above med list. Below are the changes that were made:   Medications stopped since last EPIC medication reconciliation:   There are no discontinued medications.    Medications started since last [facility] medication reconciliation:  No orders of the defined types were placed in this encounter.    REVIEW OF SYSTEMS:  4 point ROS including Respiratory, CV, GI and , other than that noted in the HPI,  is negative    Physical Exam:  /62  Pulse 82  Temp 98.2  F (36.8  C)  Resp 16  Ht 5' 2\" (1.575 m)  Wt 98 lb 3.2 oz (44.5 kg)  SpO2 99%  BMI 17.96 kg/m2  GENERAL APPEARANCE:  Alert, in no distress, appears healthy, thin, cooperative, pleasantly confused woman appearing younger than noted age watching television with other residents in day room  ENT:  Mouth and posterior oropharynx normal, moist mucous membranes, normal hearing acuity  EYES:  EOM, conjunctivae, lids, pupils and irises normal  NECK:  No adenopathy, masses or thyromegaly  RESP:  Respiratory effort and palpation of chest normal, lungs clear to auscultation, no respiratory distress  CV:  Palpation and auscultation of heart done, regular rate and rhythm, no murmur, rub, or gallop, no edema, +2 pedal pulses  ABDOMEN: Normal bowel sounds, soft, nontender, no hepatosplenomegaly or other masses  M/S:   Gait and station abnormal - GABBY 2/2 patient being in w/c; Digits and nails abnormal - arthritic changes present; Examination of right lower extremity  Inspection, ROM, stability and muscle strength decreased 2/2 hip fx  SKIN:  Palpation of skin and subcutaneous tissue baseline, Inspection of skin and subcutaneous tissue abnormal, R hip incision - GABBY today 2/2 to being in w/c  NEURO:   Cranial nerves 2-12 are normal tested and grossly at patient's baseline  PSYCH:  oriented to slef, insight and judgement impaired, memory impaired, affect " and mood normal     Last 3 BPs: 122/62, 135/84, 146/82 mmHg    Recent Labs:     CBC RESULTS:   Recent Labs   Lab Test  03/23/18   0554  03/22/18   0630  03/21/18   0701  03/20/18   2110  03/19/18   2156  07/05/17   1145   WBC   --    --    --    --   9.8  6.0   RBC   --    --    --    --   4.10  4.66   HGB   --   10.9*  11.6*   --   12.5  14.5   HCT   --    --    --    --   37.7  43.7   MCV   --    --    --    --   92  94   MCH   --    --    --    --   30.5  31.1   MCHC   --    --    --    --   33.2  33.2   RDW   --    --    --    --   12.5  13.1   PLT  264   --    --   237  250  242       Last Basic Metabolic Panel:  Recent Labs   Lab Test  03/24/18   0659  03/22/18   0630  03/21/18   0701   NA   --   141  138   POTASSIUM   --   3.8  4.2   CHLORIDE   --   109  106   AWILDA   --   8.0*  7.9*   CO2   --   25  26   BUN   --   12  16   CR  0.57  0.72  0.74   GLC   --   99  92       Assessment/Plan:   Diagnosis Comments   1. Closed fracture of neck of right femur with routine healing  Ortho intervention as above  F/u remotely today as noted above  Continue Tylenol for pain control as ordered   2. History of right hip hemiarthroplasty  As above  Xarelto for DVT prophylaxis  Wound care as ordered   3. Alzheimer's dementia without behavioral disturbance, unspecified timing of dementia onset  Pleasantly confused  Continue scheduled medications as ordered   4. Hyperlipidemia LDL goal <100  Stable without medical intervention   5. Adjustment disorder with mixed anxiety and depressed mood  Stable without medical intervention   6. Physical deconditioning  PT/OT adv per their recommendations     Electronically signed by  BERNA Conrad, JUDIE  [facility]

## 2024-12-18 ENCOUNTER — HOSPITAL ENCOUNTER (EMERGENCY)
Facility: HOSPITAL | Age: 59
Discharge: LEFT BEFORE DISCHARGE | End: 2024-12-19
Payer: MEDICAID

## 2024-12-18 DIAGNOSIS — R06.00 DYSPNEA, UNSPECIFIED TYPE: ICD-10-CM

## 2024-12-18 DIAGNOSIS — R05.8 OTHER COUGH: ICD-10-CM

## 2024-12-18 DIAGNOSIS — R07.89 CHEST HEAVINESS: ICD-10-CM

## 2024-12-18 DIAGNOSIS — M79.606 LEG PAIN: ICD-10-CM

## 2024-12-18 DIAGNOSIS — R06.02 SHORTNESS OF BREATH: ICD-10-CM

## 2024-12-18 PROCEDURE — 93005 ELECTROCARDIOGRAM TRACING: CPT

## 2024-12-18 PROCEDURE — 99900035 HC TECH TIME PER 15 MIN (STAT)

## 2024-12-18 PROCEDURE — 99285 EMERGENCY DEPT VISIT HI MDM: CPT | Mod: 25

## 2024-12-18 PROCEDURE — 93010 ELECTROCARDIOGRAM REPORT: CPT | Mod: ,,, | Performed by: INTERNAL MEDICINE

## 2024-12-18 RX ORDER — PROMETHAZINE HYDROCHLORIDE AND CODEINE PHOSPHATE 6.25; 1 MG/5ML; MG/5ML
5 SOLUTION ORAL EVERY 4 HOURS PRN
Qty: 240 ML | Refills: 0 | Status: SHIPPED | OUTPATIENT
Start: 2024-12-18 | End: 2024-12-28

## 2024-12-19 VITALS
OXYGEN SATURATION: 99 % | HEIGHT: 66 IN | WEIGHT: 182 LBS | SYSTOLIC BLOOD PRESSURE: 133 MMHG | TEMPERATURE: 98 F | RESPIRATION RATE: 13 BRPM | BODY MASS INDEX: 29.25 KG/M2 | DIASTOLIC BLOOD PRESSURE: 78 MMHG | HEART RATE: 75 BPM

## 2024-12-19 LAB
ALBUMIN SERPL BCP-MCNC: 3.8 G/DL (ref 3.5–5.2)
ALP SERPL-CCNC: 71 U/L (ref 40–150)
ALT SERPL W/O P-5'-P-CCNC: 13 U/L (ref 10–44)
ANION GAP SERPL CALC-SCNC: 16 MMOL/L (ref 8–16)
ANISOCYTOSIS BLD QL SMEAR: ABNORMAL
AST SERPL-CCNC: 16 U/L (ref 10–40)
BASOPHILS # BLD AUTO: 0.04 K/UL (ref 0–0.2)
BASOPHILS NFR BLD: 0.8 % (ref 0–1.9)
BILIRUB SERPL-MCNC: 2.4 MG/DL (ref 0.1–1)
BNP SERPL-MCNC: >4900 PG/ML (ref 0–99)
BUN SERPL-MCNC: 120 MG/DL (ref 6–20)
CALCIUM SERPL-MCNC: 9.3 MG/DL (ref 8.7–10.5)
CHLORIDE SERPL-SCNC: 100 MMOL/L (ref 95–110)
CO2 SERPL-SCNC: 24 MMOL/L (ref 23–29)
CREAT SERPL-MCNC: 4.4 MG/DL (ref 0.5–1.4)
DACRYOCYTES BLD QL SMEAR: ABNORMAL
DIFFERENTIAL METHOD BLD: ABNORMAL
EOSINOPHIL # BLD AUTO: 0.1 K/UL (ref 0–0.5)
EOSINOPHIL NFR BLD: 1.9 % (ref 0–8)
ERYTHROCYTE [DISTWIDTH] IN BLOOD BY AUTOMATED COUNT: 29.1 % (ref 11.5–14.5)
EST. GFR  (NO RACE VARIABLE): 11 ML/MIN/1.73 M^2
GIANT PLATELETS BLD QL SMEAR: PRESENT
GLUCOSE SERPL-MCNC: 126 MG/DL (ref 70–110)
HCT VFR BLD AUTO: 36.1 % (ref 37–48.5)
HGB BLD-MCNC: 10.5 G/DL (ref 12–16)
HOWELL-JOLLY BOD BLD QL SMEAR: ABNORMAL
HYPOCHROMIA BLD QL SMEAR: ABNORMAL
IMM GRANULOCYTES # BLD AUTO: 0.01 K/UL (ref 0–0.04)
IMM GRANULOCYTES NFR BLD AUTO: 0.2 % (ref 0–0.5)
LYMPHOCYTES # BLD AUTO: 0.6 K/UL (ref 1–4.8)
LYMPHOCYTES NFR BLD: 12.8 % (ref 18–48)
MCH RBC QN AUTO: 18.4 PG (ref 27–31)
MCHC RBC AUTO-ENTMCNC: 29.1 G/DL (ref 32–36)
MCV RBC AUTO: 63 FL (ref 82–98)
MONOCYTES # BLD AUTO: 0.5 K/UL (ref 0.3–1)
MONOCYTES NFR BLD: 10.9 % (ref 4–15)
NEUTROPHILS # BLD AUTO: 3.5 K/UL (ref 1.8–7.7)
NEUTROPHILS NFR BLD: 73.4 % (ref 38–73)
NRBC BLD-RTO: 4 /100 WBC
OVALOCYTES BLD QL SMEAR: ABNORMAL
PLATELET # BLD AUTO: 175 K/UL (ref 150–450)
PLATELET BLD QL SMEAR: ABNORMAL
PMV BLD AUTO: ABNORMAL FL (ref 9.2–12.9)
POIKILOCYTOSIS BLD QL SMEAR: ABNORMAL
POLYCHROMASIA BLD QL SMEAR: ABNORMAL
POTASSIUM SERPL-SCNC: 4.4 MMOL/L (ref 3.5–5.1)
PROT SERPL-MCNC: 6.6 G/DL (ref 6–8.4)
RBC # BLD AUTO: 5.72 M/UL (ref 4–5.4)
SCHISTOCYTES BLD QL SMEAR: ABNORMAL
SCHISTOCYTES BLD QL SMEAR: PRESENT
SODIUM SERPL-SCNC: 140 MMOL/L (ref 136–145)
SPHEROCYTES BLD QL SMEAR: ABNORMAL
TARGETS BLD QL SMEAR: ABNORMAL
TROPONIN I SERPL DL<=0.01 NG/ML-MCNC: 1.17 NG/ML (ref 0–0.03)
TROPONIN I SERPL DL<=0.01 NG/ML-MCNC: 1.26 NG/ML (ref 0–0.03)
TROPONIN I SERPL DL<=0.01 NG/ML-MCNC: 1.3 NG/ML (ref 0–0.03)
WBC # BLD AUTO: 4.77 K/UL (ref 3.9–12.7)

## 2024-12-19 PROCEDURE — 80053 COMPREHEN METABOLIC PANEL: CPT

## 2024-12-19 PROCEDURE — 85025 COMPLETE CBC W/AUTO DIFF WBC: CPT

## 2024-12-19 PROCEDURE — 83880 ASSAY OF NATRIURETIC PEPTIDE: CPT

## 2024-12-19 PROCEDURE — 63600175 PHARM REV CODE 636 W HCPCS

## 2024-12-19 PROCEDURE — 84484 ASSAY OF TROPONIN QUANT: CPT | Mod: 91

## 2024-12-19 RX ORDER — FUROSEMIDE 10 MG/ML
60 INJECTION INTRAMUSCULAR; INTRAVENOUS
Status: COMPLETED | OUTPATIENT
Start: 2024-12-19 | End: 2024-12-19

## 2024-12-19 RX ORDER — HYDROMORPHONE HYDROCHLORIDE 1 MG/ML
0.5 INJECTION, SOLUTION INTRAMUSCULAR; INTRAVENOUS; SUBCUTANEOUS
Status: COMPLETED | OUTPATIENT
Start: 2024-12-19 | End: 2024-12-19

## 2024-12-19 RX ADMIN — HYDROMORPHONE HYDROCHLORIDE 0.5 MG: 1 INJECTION, SOLUTION INTRAMUSCULAR; INTRAVENOUS; SUBCUTANEOUS at 03:12

## 2024-12-19 RX ADMIN — FUROSEMIDE 60 MG: 10 INJECTION, SOLUTION INTRAMUSCULAR; INTRAVENOUS at 03:12

## 2024-12-19 NOTE — ED NOTES
ED provider notified of patient's blood pressure.  Patient placed on 2L via nasal cannula.  Patient states she is on 2L nasal cannula OTC.

## 2024-12-19 NOTE — ED PROVIDER NOTES
"Encounter Date: 12/18/2024    Source of History:   Patient and medical record, without language barrier or      Chief complaint:  Leg Swelling (Pt to ED with c/o pain "from waist down" and bilateral lower extremity pain and swelling; [place] [facility] [place]    HPI:  Hafsa Hawley is a 59 y.o. female with CHF on dobutamine drip, ESRD, COPD, DM to presenting with chief complaint of leg swelling.  Patient reports pain from waist down to bilateral legs.  She also reports bilateral lower extremity edema.  [place] [facility] [place].  The patient states this pain has been ongoing for months.  Endorses mild shortness of breath no chest pain    This is the extent to the patients complaints today here in the emergency department.    ROS: A review of systems was conducted with pertinent positive and negative findings documented in HPI with all other systems reviewed and negative.  ROS    Review of patient's allergies indicates:   Allergen Reactions    Penicillins Hives and Other (See Comments)    Iodinated contrast media Nausea And Vomiting    Oxycodone-acetaminophen Other (See Comments)     Nausea, Dizziness, Anxiety.  "I don't like how it makes me feel."   Given Hydromorphone 0.5mg IVP  Without problems.  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 Metabolism of other drugs not listed " here may also be impacted by UGT1A1 enzyme activity.       Tramadol Nausea And Vomiting and Other (See Comments)     Other reaction(s): Other (See Comments)    Valsartan Other (See Comments)       PMH:  As per HPI and below:  Past Medical History:   Diagnosis Date    Allergy     Anemia     Arthritis     Atrial fibrillation     OAC    BMI 32.0-32.9,adult 02/22/2023    Chronic respiratory failure with hypoxia, on home oxygen therapy     2L with activity, off at rest.  Per Pulm  no overt evidence of ILD or COPD on PFTs and CT to explain O2 needs.    CKD (chronic kidney disease), stage IV 05/08/2018    Congestive heart failure     s/p AICD placement,    Deep vein thrombosis     Depression     elevated bilirubin d/t Gilbert's syndrome     confirmed by [place] genetic testing, evaluated by hepatology    Encounter for blood transfusion     GERD (gastroesophageal reflux disease)     Hypertension     Pheochromocytoma, malignant     Right kidney mass     Sleep apnea     Thalassemia trait, alpha     Thyroid disease     Type 2 diabetes mellitus with hyperglycemia, without long-term current use of insulin 08/13/2020     Past Surgical History:   Procedure Laterality Date    ANGIOGRAM, ABDOMINAL AORTA Right 04/15/2024    APPENDECTOMY      BONE MARROW BIOPSY      CARDIAC DEFIBRILLATOR PLACEMENT Left 12/2016    CARDIAC ELECTROPHYSIOLOGY MAPPING AND ABLATION      CARDIAC ELECTROPHYSIOLOGY MAPPING AND ABLATION      COLONOSCOPY N/A 05/06/2022    Procedure: COLONOSCOPY;  Surgeon: Arely Betancourt MD;  Location: [facility] ([address] [place] [address]);  Service: Endoscopy;  Laterality: N/A;  heart transplant candidate/ EF 25% - 2nd floor/ defib - Biotronik - ERW  Eliquis - per Dr. Cortez with [facility], Pt ok to hold Eliquis x 2 days prior-see media tab-outside correspondence dated 12/30/21  - ER[facility]  verbal instructions/portal instructions/email instructions - s    EYE SURGERY      due to running tears    FRACTURE SURGERY Left     hand 5th digit    HYSTERECTOMY       KNEE SURGERY Left 2016    hematoma    LIVER BIOPSY  10/24/2018    Minimal steatosis, predominantly macrovesicular, 1%, Minimal nonspecific portal inflammation, no fibrosis. No findings on biopsy to explain elevated bilirubin levels. Could be d/t Gilbert's =?- hemolysis    RIGHT HEART CATHETERIZATION Right 12/07/2021    Procedure: INSERTION, CATHETER, RIGHT HEART;  Surgeon: Irving Cardenas MD;  Location: [facility] CATH LAB;  Service: Cardiology;  Laterality: Right;    RIGHT HEART CATHETERIZATION Right 12/19/2022    Procedure: INSERTION, CATHETER, RIGHT HEART;  Surgeon: Burke Camilo MD;  Location: [facility] CATH LAB;  Service: Cardiology;  Laterality: Right;    RIGHT HEART CATHETERIZATION Right 03/29/2023    Procedure: INSERTION, CATHETER, RIGHT HEART;  Surgeon: Katie Liriano DO;  Location: [facility] CATH LAB;  Service: Cardiology;  Laterality: Right;    TRANSJUGULAR BIOPSY OF LIVER N/A 10/24/2018    Procedure: BIOPSY, LIVER, TRANSJUGULAR APPROACH;  Surgeon: Carmen Diagnostic Provider;  Location: [facility] OR [address] [place] [address];  Service: Radiology;  Laterality: N/A;     Social History     Socioeconomic History    Marital status:    Tobacco Use    Smoking status: Never    Smokeless tobacco: Never   Substance and Sexual Activity    Alcohol use: Not Currently     Comment: up to 1 yr ago drank 2-3 drinks on occasion but sporadic    Drug use: No    Sexual activity: Yes     Partners: Male   Social History Narrative    On disability since 2013     Social Drivers of Health     Financial Resource Strain: Low Risk  (12/10/2024)    Overall Financial Resource Strain (CARDIA)     Difficulty of Paying Living Expenses: Not very hard   Food Insecurity: No Food Insecurity (12/10/2024)    Hunger Vital Sign     Worried About Running Out of Food in the Last Year: Never true     Ran Out of Food in the Last Year: Never true   Transportation Needs: No Transportation Needs (12/10/2024)    TRANSPORTATION NEEDS     Transportation : No   Physical  "Activity: Inactive (8/8/2024)    Exercise Vital Sign     Days of Exercise per Week: 0 days     Minutes of Exercise per Session: 0 min   Stress: No Stress Concern Present (12/10/2024)    [employer] - Occupational Stress Questionnaire     Feeling of Stress : Not at all   Recent Concern: Stress - Stress Concern Present (11/22/2024)    [employer] - Occupational Stress Questionnaire     Feeling of Stress : Rather much   Housing Stability: Low Risk  (12/10/2024)    Housing Stability Vital Sign     Unable to Pay for Housing in the Last Year: No     Homeless in the Last Year: No     Vitals:    /78   Pulse 75   Temp 97.9 °F (36.6 °C) (Oral)   Resp 13   Ht 5' 6" (1.676 m)   Wt 82.6 kg (182 lb)   LMP 10/23/2001   SpO2 99%   BMI 29.38 kg/m²     Physical Exam  Vitals and nursing note reviewed.   Constitutional:       General: She is not in acute distress.     Appearance: She is not toxic-appearing.   HENT:      Head: Normocephalic and atraumatic.      Mouth/Throat:      Mouth: Mucous membranes are moist.   Eyes:      General: No scleral icterus.  Cardiovascular:      Rate and Rhythm: Normal rate and regular rhythm.      Pulses: Normal pulses.      Heart sounds: Normal heart sounds. No murmur heard.     No friction rub. No gallop.   Pulmonary:      Effort: Pulmonary effort is normal. No respiratory distress.      Breath sounds: Normal breath sounds. No stridor. No wheezing, rhonchi or rales.   Abdominal:      General: Abdomen is flat. There is no distension.      Palpations: Abdomen is soft.      Tenderness: There is no abdominal tenderness. There is no guarding.   Musculoskeletal:         General: No swelling or deformity.      Cervical back: Normal range of motion and neck supple.   Skin:     General: Skin is warm and dry.      Capillary Refill: Capillary refill takes less than 2 seconds.      Coloration: Skin is not jaundiced.      Findings: No rash. "   Neurological:      Mental Status: Mental status is at baseline.       Procedures    Laboratory Studies:  Labs that have been ordered have been independently reviewed and interpreted by myself.  Labs Reviewed   CBC W/ AUTO DIFFERENTIAL - Abnormal       Result Value    WBC 4.77      RBC 5.72 (*)     Hemoglobin 10.5 (*)     Hematocrit 36.1 (*)     MCV 63 (*)     MCH 18.4 (*)     MCHC 29.1 (*)     RDW 29.1 (*)     Platelets 175      MPV SEE COMMENT      Immature Granulocytes 0.2      Gran # (ANC) 3.5      Immature Grans (Abs) 0.01      Lymph # 0.6 (*)     Mono # 0.5      Eos # 0.1      Baso # 0.04      nRBC 4 (*)     Gran % 73.4 (*)     Lymph % 12.8 (*)     Mono % 10.9      Eosinophil % 1.9      Basophil % 0.8      Platelet Estimate Appears normal      Aniso Marked      Poik Marked      Poly Moderate      Hypo Moderate      Ovalocytes Occasional      Target Cells Occasional      Tear Drop Cells Occasional      Spherocytes Occasional      Schistocytes Present      [place] Bodies Occasional      Large/Giant Platelets Present      Fragmented Cells Occasional      Differential Method Automated     COMPREHENSIVE METABOLIC PANEL - Abnormal    Sodium 140      Potassium 4.4      Chloride 100      CO2 24      Glucose 126 (*)      (*)     Creatinine 4.4 (*)     Calcium 9.3      Total Protein 6.6      Albumin 3.8      Total Bilirubin 2.4 (*)     Alkaline Phosphatase 71      AST 16      ALT 13      eGFR 11 (*)     Anion Gap 16     TROPONIN I - Abnormal    Troponin I 1.296 (*)    B-TYPE NATRIURETIC PEPTIDE - Abnormal    BNP >4,900 (*)    TROPONIN I - Abnormal    Troponin I 1.168 (*)    TROPONIN I - Abnormal    Troponin I 1.258 (*)      Imaging Results              X-Ray Chest AP Portable (Final result)  Result time 12/19/24 00:46:44      Final result by Varinder Calvin MD (12/19/24 00:46:44)                   Impression:      Radiographic findings as above.      Electronically signed by: Varinder  Marixa  Date:    12/19/2024  Time:    00:46               Narrative:    EXAMINATION:  XR CHEST AP PORTABLE    CLINICAL HISTORY:  CHF;    TECHNIQUE:  Single frontal view of the chest was performed.    COMPARISON:  Chest radiograph December 11, 2024    FINDINGS:  Single portable chest radiograph is submitted.  Cardiac pacemaker and right-sided PICC line again noted.  There is mild diminished depth of inspiration, the heart size appears enlarged, the appearance of enlarged cardiomediastinal silhouette appears stable when compared to the prior examination.    Mild prominence of the pulmonary vasculature is noted, there is also appearance of mild accentuation of interstitial markings that may relate to mild interstitial infiltrate/edema, mild ground-glass opacities may relate to mild ground-glass infiltrates.    Opacity at the left lung base may relate to partial obscuration due to the cardiomediastinal silhouette and appears similar to the prior study the possibility of underlying infiltrate or atelectasis or pleural fluid would be difficult to exclude.  There is no evidence for significant pleural effusion on the right and bilaterally there is no evidence for pneumothorax.    The osseous structures demonstrate chronic change.                                      EKG (independently interpreted by me): Rhythm:  NSR, rate of  87 BPM, no ST elevations or depressions, QTc 490    Imaging (independently interpreted by me):  Chest x-ray:  Cardiomegaly and mild interstitial edema    I decided to obtain the patient's medical records.  Summary of Medical Records:    Medications   furosemide injection 60 mg (60 mg Intravenous Given 12/19/24 0331)   HYDROmorphone injection 0.5 mg (0.5 mg Intravenous Given 12/19/24 0340)     MDM:    59 y.o. female with shortness of breath    Differential Dx:  Differential includes but is not limited to CHF, ACS, cap    ED Management:  CHF workup started for acute presentation of an emergent  condition.  Dilaudid for symptomatic treatment.  Lasix for diuresis.  Patient the patient to be in heart failure exacerbation, however elevated troponin and BNP are baseline for the patient.  She is on her baseline 2 L nasal cannula and dobutamine infusion.  Patient is signed out to oncoming physician pending DVT ultrasounds and final disposition    Medical Decision Making  Amount and/or Complexity of Data Reviewed  Labs: ordered. Decision-making details documented in ED Course.  Radiology: ordered and independent interpretation performed.  ECG/medicine tests: ordered and independent interpretation performed.    Risk  Prescription drug management.            Diagnostic Impression:    Final diagnoses:  [R07.89] Chest heaviness  [R06.02] Shortness of breath  [M79.606] Leg pain     ED Disposition Condition    Sin Daniels MD  12/20/24 5033

## 2024-12-19 NOTE — ED NOTES
Patient does not want to be transferred and wants to leave AMA.  Dr. Barragan aware and at bedside.  Patient verbalized understanding of risks of leaving and encouraged to return with any worsening symptoms.  AMA form signed and placed in file pile.  IV catheter removed with cath tip intact.

## 2024-12-19 NOTE — ED NOTES
Assumed care of patient at this time.  Patient transferred from ED lobby to room 6.  Patient placed on continuous cardiac monitor with blood pressure and pulse ox monitor on patient. Patient NSR with first degree AV block and occasional PVC's.  Patient reports pain in BLE, worse on the right.  Respirations even and unlabored, without distress.  Neuro intact.  Call bell within reach.  Patient instructed to notify with any problems or concerns.  Verbalized understanding.

## 2024-12-20 LAB
OHS QRS DURATION: 110 MS
OHS QTC CALCULATION: 490 MS

## 2025-01-03 PROBLEM — E63.9 INADEQUATE DIETARY ENERGY INTAKE: Status: ACTIVE | Noted: 2025-01-01

## 2025-01-06 PROBLEM — R57.0 CARDIOGENIC SHOCK: Status: ACTIVE | Noted: 2025-01-01

## 2025-01-30 NOTE — PROGRESS NOTES
"Subjective:       Patient ID: Hafsa Hawley is a 53 y.o. female.    Chief Complaint: Colonoscopy    54 yo F here for f/u.  She was scheduled for a colonoscopy last Fall but had to cancel day of procedure after already drinking the prep due to an emergency with her grandchild.  She is still in workup/talks for heart transplant and is clinically unchanged from previous.  She has not seen any further rectal bleeding or abdominal pain.  She complains of a rash under her breasts and below her pannus.  She has been given powder from PCP and has tried every OTC anti-fungal with varying degrees of temporary success, but it always returns and seems to be getting worse.  She was given clearance to hold Eliquis previously for colonoscopy.    Review of Systems   Constitutional: Negative for appetite change.   Respiratory: Negative for chest tightness, shortness of breath and wheezing.    Cardiovascular: Negative for chest pain, palpitations and leg swelling.       Objective:       /78 (BP Location: Right arm, Patient Position: Sitting)   Pulse 80   Ht 5' 6" (1.676 m)   Wt 93 kg (205 lb)   LMP  (LMP Unknown)   BMI 33.09 kg/m²     Physical Exam   Constitutional: She appears well-developed and well-nourished.   Abdominal: Soft. Bowel sounds are normal.   Skin:   Dark pigmented rash under bilateral breast with excoriations present   Psychiatric: She has a normal mood and affect. Her behavior is normal. Judgment and thought content normal.       Lab Results   Component Value Date    WBC 6.43 02/14/2019    HGB 9.3 (L) 02/14/2019    HCT 30.8 (L) 02/14/2019    MCV 57 (L) 02/14/2019     02/14/2019     CMP  Sodium   Date Value Ref Range Status   02/14/2019 143 136 - 145 mmol/L Final     Potassium   Date Value Ref Range Status   02/14/2019 4.4 3.5 - 5.1 mmol/L Final     Chloride   Date Value Ref Range Status   02/14/2019 109 95 - 110 mmol/L Final     CO2   Date Value Ref Range Status   02/14/2019 27 23 - 29 mmol/L " Please schedule next qutenza application, 91 days from today. Rm 13 Final     Glucose   Date Value Ref Range Status   02/14/2019 124 (H) 70 - 110 mg/dL Final     BUN, Bld   Date Value Ref Range Status   02/14/2019 32 (H) 6 - 20 mg/dL Final     Creatinine   Date Value Ref Range Status   02/14/2019 1.3 0.5 - 1.4 mg/dL Final     Calcium   Date Value Ref Range Status   02/14/2019 9.7 8.7 - 10.5 mg/dL Final     Total Protein   Date Value Ref Range Status   02/14/2019 7.6 6.0 - 8.4 g/dL Final     Albumin   Date Value Ref Range Status   02/14/2019 3.6 3.5 - 5.2 g/dL Final     Total Bilirubin   Date Value Ref Range Status   02/14/2019 1.9 (H) 0.1 - 1.0 mg/dL Final     Comment:     For infants and newborns, interpretation of results should be based  on gestational age, weight and in agreement with clinical  observations.  Premature Infant recommended reference ranges:  Up to 24 hours.............<8.0 mg/dL  Up to 48 hours............<12.0 mg/dL  3-5 days..................<15.0 mg/dL  6-29 days.................<15.0 mg/dL       Alkaline Phosphatase   Date Value Ref Range Status   02/14/2019 68 55 - 135 U/L Final     AST   Date Value Ref Range Status   02/14/2019 15 10 - 40 U/L Final     ALT   Date Value Ref Range Status   02/14/2019 11 10 - 44 U/L Final     Anion Gap   Date Value Ref Range Status   02/14/2019 7 (L) 8 - 16 mmol/L Final     eGFR if    Date Value Ref Range Status   02/14/2019 54.1 (A) >60 mL/min/1.73 m^2 Final     eGFR if non    Date Value Ref Range Status   02/14/2019 47.0 (A) >60 mL/min/1.73 m^2 Final     Comment:     Calculation used to obtain the estimated glomerular filtration  rate (eGFR) is the CKD-EPI equation.          Assessment:       1. Screen for colon cancer    2. Rash    3. NICM (nonischemic cardiomyopathy)        Plan:       Screen for colon cancer  -     polyethylene glycol (GOLYTELY,NULYTELY) 236-22.74-6.74 -5.86 gram suspension; Use as directed  Dispense: 1 Bottle; Refill: 0    Rash  -     Ambulatory consult to  Dermatology    NICM (nonischemic cardiomyopathy)        -     Will get clearance again to hold Eliquis for 3 days prior to colonoscopy, just to be sure nothing has changed from Dr. Cortez' perspective        -     She is high risk for endoscopy due to CHF with cardiomyopathy, etc.

## (undated) DEVICE — PATCH ENSITE PRECISION NAVX SE

## (undated) DEVICE — DRESSING TRANS 4X4 TEGADERM

## (undated) DEVICE — KIT PROBE COVER WITH GEL

## (undated) DEVICE — Device

## (undated) DEVICE — INTRO 8.5FR 63CM SRO

## (undated) DEVICE — TRAY CATH LAB OMC

## (undated) DEVICE — CATH EZ STEER 4MM 7F D-F 115CM

## (undated) DEVICE — CATH SWAN GANZ STND 7FR

## (undated) DEVICE — SEE MEDLINE ITEM 156894

## (undated) DEVICE — SHEATH INTRODUCER 7FR 11CM

## (undated) DEVICE — KIT MICROINTRODUCE MINI 5X10CM

## (undated) DEVICE — PACK EP DRAPE

## (undated) DEVICE — PAD DEFIB CADENCE ADULT R2

## (undated) DEVICE — CATH LIVEWIRE DCAPLR 7FRX115CM

## (undated) DEVICE — CATH SUPREME QPLR CRD-2 6F 120

## (undated) DEVICE — CATH RESPONSE QPLR JSN 6F 120

## (undated) DEVICE — SET MICROPUNCTURE 5FR 501NT

## (undated) DEVICE — ELECTRODE POLYHESIVEPRE-ATTACH